# Patient Record
Sex: MALE | Race: OTHER | HISPANIC OR LATINO | URBAN - METROPOLITAN AREA
[De-identification: names, ages, dates, MRNs, and addresses within clinical notes are randomized per-mention and may not be internally consistent; named-entity substitution may affect disease eponyms.]

---

## 2023-08-31 ENCOUNTER — EMERGENCY (EMERGENCY)
Facility: HOSPITAL | Age: 43
LOS: 1 days | Discharge: HOME | End: 2023-08-31
Admitting: EMERGENCY MEDICINE
Payer: OTHER MISCELLANEOUS

## 2023-08-31 VITALS
RESPIRATION RATE: 18 BRPM | SYSTOLIC BLOOD PRESSURE: 217 MMHG | WEIGHT: 212.08 LBS | HEART RATE: 80 BPM | DIASTOLIC BLOOD PRESSURE: 142 MMHG | TEMPERATURE: 98 F

## 2023-08-31 VITALS — DIASTOLIC BLOOD PRESSURE: 97 MMHG | SYSTOLIC BLOOD PRESSURE: 180 MMHG | HEART RATE: 78 BPM
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
## 2023-08-31 DIAGNOSIS — Z86.59 PERSONAL HISTORY OF OTHER MENTAL AND BEHAVIORAL DISORDERS: ICD-10-CM
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
## 2023-08-31 DIAGNOSIS — S61.213A LACERATION WITHOUT FOREIGN BODY OF LEFT MIDDLE FINGER WITHOUT DAMAGE TO NAIL, INITIAL ENCOUNTER: ICD-10-CM

## 2023-08-31 DIAGNOSIS — E78.5 HYPERLIPIDEMIA, UNSPECIFIED: ICD-10-CM
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
## 2023-08-31 DIAGNOSIS — Z86.73 PERSONAL HISTORY OF TRANSIENT ISCHEMIC ATTACK (TIA), AND CEREBRAL INFARCTION WITHOUT RESIDUAL DEFICITS: ICD-10-CM

## 2023-08-31 DIAGNOSIS — I10 ESSENTIAL (PRIMARY) HYPERTENSION: ICD-10-CM

## 2023-08-31 LAB
ALBUMIN SERPL ELPH-MCNC: 3.4 G/DL — SIGNIFICANT CHANGE UP (ref 3.4–5)
ALP SERPL-CCNC: 153 U/L — HIGH (ref 40–120)
ALT FLD-CCNC: 25 U/L — SIGNIFICANT CHANGE UP (ref 12–42)
ANION GAP SERPL CALC-SCNC: 9 MMOL/L — SIGNIFICANT CHANGE UP (ref 9–16)
APPEARANCE UR: CLEAR — SIGNIFICANT CHANGE UP
AST SERPL-CCNC: 16 U/L — SIGNIFICANT CHANGE UP (ref 15–37)
BACTERIA # UR AUTO: ABNORMAL /HPF
BILIRUB SERPL-MCNC: 0.5 MG/DL — SIGNIFICANT CHANGE UP (ref 0.2–1.2)
BILIRUB UR-MCNC: NEGATIVE — SIGNIFICANT CHANGE UP
BUN SERPL-MCNC: 30 MG/DL — HIGH (ref 7–23)
CALCIUM SERPL-MCNC: 8.6 MG/DL — SIGNIFICANT CHANGE UP (ref 8.5–10.5)
CHLORIDE SERPL-SCNC: 103 MMOL/L — SIGNIFICANT CHANGE UP (ref 96–108)
CO2 SERPL-SCNC: 24 MMOL/L — SIGNIFICANT CHANGE UP (ref 22–31)
COLOR SPEC: YELLOW — SIGNIFICANT CHANGE UP
CREAT SERPL-MCNC: 2.5 MG/DL — HIGH (ref 0.5–1.3)
DIFF PNL FLD: ABNORMAL
EGFR: 32 ML/MIN/1.73M2 — LOW
EPI CELLS # UR: PRESENT
GLUCOSE SERPL-MCNC: 111 MG/DL — HIGH (ref 70–99)
GLUCOSE UR QL: NEGATIVE MG/DL — SIGNIFICANT CHANGE UP
HCT VFR BLD CALC: 48 % — SIGNIFICANT CHANGE UP (ref 39–50)
HGB BLD-MCNC: 16.1 G/DL — SIGNIFICANT CHANGE UP (ref 13–17)
HYALINE CASTS # UR AUTO: PRESENT
KETONES UR-MCNC: NEGATIVE MG/DL — SIGNIFICANT CHANGE UP
LEUKOCYTE ESTERASE UR-ACNC: NEGATIVE — SIGNIFICANT CHANGE UP
MCHC RBC-ENTMCNC: 29 PG — SIGNIFICANT CHANGE UP (ref 27–34)
MCHC RBC-ENTMCNC: 33.5 GM/DL — SIGNIFICANT CHANGE UP (ref 32–36)
MCV RBC AUTO: 86.5 FL — SIGNIFICANT CHANGE UP (ref 80–100)
NITRITE UR-MCNC: NEGATIVE — SIGNIFICANT CHANGE UP
NRBC # BLD: 0 /100 WBCS — SIGNIFICANT CHANGE UP (ref 0–0)
PH UR: 6.5 — SIGNIFICANT CHANGE UP (ref 5–8)
PLATELET # BLD AUTO: 227 K/UL — SIGNIFICANT CHANGE UP (ref 150–400)
POTASSIUM SERPL-MCNC: 4.1 MMOL/L — SIGNIFICANT CHANGE UP (ref 3.5–5.3)
POTASSIUM SERPL-SCNC: 4.1 MMOL/L — SIGNIFICANT CHANGE UP (ref 3.5–5.3)
PROT SERPL-MCNC: 7.7 G/DL — SIGNIFICANT CHANGE UP (ref 6.4–8.2)
PROT UR-MCNC: 300 MG/DL
RBC # BLD: 5.55 M/UL — SIGNIFICANT CHANGE UP (ref 4.2–5.8)
RBC # FLD: 12.4 % — SIGNIFICANT CHANGE UP (ref 10.3–14.5)
RBC CASTS # UR COMP ASSIST: 5 /HPF — HIGH (ref 0–4)
SODIUM SERPL-SCNC: 136 MMOL/L — SIGNIFICANT CHANGE UP (ref 132–145)
SP GR SPEC: 1.01 — SIGNIFICANT CHANGE UP (ref 1–1.03)
TROPONIN I, HIGH SENSITIVITY RESULT: 8 NG/L — SIGNIFICANT CHANGE UP
UROBILINOGEN FLD QL: 0.2 MG/DL — SIGNIFICANT CHANGE UP (ref 0.2–1)
WBC # BLD: 5.97 K/UL — SIGNIFICANT CHANGE UP (ref 3.8–10.5)
WBC # FLD AUTO: 5.97 K/UL — SIGNIFICANT CHANGE UP (ref 3.8–10.5)
WBC UR QL: 5 /HPF — SIGNIFICANT CHANGE UP (ref 0–5)

## 2023-08-31 PROCEDURE — 99285 EMERGENCY DEPT VISIT HI MDM: CPT

## 2023-08-31 RX ORDER — HYDRALAZINE HCL 50 MG
10 TABLET ORAL ONCE
Refills: 0 | Status: COMPLETED | OUTPATIENT
Start: 2023-08-31 | End: 2023-08-31

## 2023-08-31 RX ORDER — ACETAMINOPHEN 500 MG
650 TABLET ORAL ONCE
Refills: 0 | Status: COMPLETED | OUTPATIENT
Start: 2023-08-31 | End: 2023-08-31

## 2023-08-31 RX ORDER — TRANEXAMIC ACID 100 MG/ML
5 INJECTION, SOLUTION INTRAVENOUS ONCE
Refills: 0 | Status: COMPLETED | OUTPATIENT
Start: 2023-08-31 | End: 2023-08-31

## 2023-08-31 RX ORDER — AMLODIPINE BESYLATE 2.5 MG/1
10 TABLET ORAL ONCE
Refills: 0 | Status: COMPLETED | OUTPATIENT
Start: 2023-08-31 | End: 2023-08-31

## 2023-08-31 RX ORDER — AMLODIPINE BESYLATE 2.5 MG/1
1 TABLET ORAL
Qty: 14 | Refills: 0
Start: 2023-08-31 | End: 2023-09-13

## 2023-08-31 RX ADMIN — Medication 650 MILLIGRAM(S): at 12:20

## 2023-08-31 RX ADMIN — Medication 10 MILLIGRAM(S): at 12:21

## 2023-08-31 RX ADMIN — Medication 650 MILLIGRAM(S): at 11:56

## 2023-08-31 RX ADMIN — AMLODIPINE BESYLATE 10 MILLIGRAM(S): 2.5 TABLET ORAL at 11:22

## 2023-08-31 RX ADMIN — TRANEXAMIC ACID 5 MILLILITER(S): 100 INJECTION, SOLUTION INTRAVENOUS at 11:56

## 2023-08-31 RX ADMIN — Medication 10 MILLIGRAM(S): at 11:53

## 2023-08-31 NOTE — ED PROVIDER NOTE - CLINICAL SUMMARY MEDICAL DECISION MAKING FREE TEXT BOX
43-year-old male presents this emergency department for skin avulsion of the left finger  Pressure dressing applied, and will continue to monitor patient.    Patient's BP on arrival was 230/140.  10 mg of amlodipine, p.o. ordered.  We will recheck it 20 minutes after amlodipine is given.  Tetanus is up-to-date  Labs, cardiac profile, EKG ordered  EKG shows normal sinus 65 bpm, T wave inversions in lead III that are isolated.  No comparison available.  We will continue to monitor patient

## 2023-08-31 NOTE — ED ADULT TRIAGE NOTE - CHIEF COMPLAINT QUOTE
Pt complaining of laceration to L middle finger. Last Tdap 4 years ago. Pt hypertensive in triage states that he has not had his medication in 3 days. History of CVA. Pt complaining PT denies chest pain, headache and blurry vision. Pt complaining of laceration to L middle finger. Last Tdap 4 years ago. Pt hypertensive in triage states that he has not had his medication in 3 days. History of CVA. PT denies chest pain, headache and blurry vision.

## 2023-08-31 NOTE — ED PROVIDER NOTE - NSFOLLOWUPCLINICS_GEN_ALL_ED_FT
[facility]  Family Medicine  [address], [address]  [place], [place] [address]  Phone: [phone]  Fax:

## 2023-08-31 NOTE — ED PROVIDER NOTE - PROGRESS NOTE DETAILS
Patient sitting comfortably in room, no acute stress  Patient received total of 20 mg of hydralazine, the IV.  Bleeding is well controlled.  Repeat blood pressure is 181/97.  Will order amlodipine to the pharmacy, and discharge patient.  Also to the patient.  Patient understands and agrees with plan.  Agreed to follow-up with primary care doctor in 2 to 3 days.

## 2023-08-31 NOTE — ED PROVIDER NOTE - PATIENT PORTAL LINK FT
You can access the FollowMyHealth Patient Portal offered by [facility] by registering at the following website: [email or web address]. By joining [employer]’s FollowMyHealth portal, you will also be able to view your health information using other applications (apps) compatible with our system.

## 2023-08-31 NOTE — ED PROVIDER NOTE - OBJECTIVE STATEMENT
43-year-old male, medical history of hypertension, hyperlipidemia, and CVA 4 years ago, alcohol abuse, presents to this emergency department for a skin avulsion that occurred at work 45 minutes prior to arrival.  Patient states that he was cutting with a knife when it slipped.  Avulsion is on the left middle finger on the dorsal aspect between the PIP and DIP joint.  States that started bleeding immediately, and he wrapped it up.  However states is not stopping bleeding.    Of note patient has a blood pressure of 240/140 on arrival.  States that he has a history of hypertension, however has not taken his medications in 3 days because he ran out.  States that he does not have primary care doctor" I usually get it from the hospital".  Is unsure what he takes.  Denies any headaches, dizziness, chest pain, shortness of breath, abdominal pain, nausea, vomiting, diarrhea, constipation, hematuria.    Tetanus is up-to-date.

## 2023-08-31 NOTE — ED PROVIDER NOTE - PHYSICAL EXAMINATION
General: well developed, well nourished, no distress  Eye: bilateral: PERRL, EOMI  Ears, Nose, Throat: normal pharynx, TMs normal, membranes moist.  Neck: non-tender, full range of motion, supple.  Negative For: lymphadenopathy (R), lymphadenopathy (L)  Respiratory: CTAB.  Cardiovascular: S1-S2 normal, regular rate, regular rhythm.  Abdomen: normal bowel sounds, non tender, soft.    Genitourinary: Negative For: CVA tenderness  Musculoskeletal: normal gait.  Negative For: back pain, upper, back pain  Extremities: normal range of motion, non-tender.  Negative For: edema (R), edema (L), calf tenderness (R), calf tenderness (L), swelling  Extremity Strength: upper extremities equal bilateral: 5/5, lower extremities equal bilateral: 5/5  Neurologic: alert, oriented to person, oriented to place, oriented to time.    Skin: normal color.    Skin avulsion appreciated on the left middle finger on the dorsal aspect between the PIP and DIP joint.  Blood oozing from it, likely venous.  Range of motion is intact in all other joints.  Psychiatric: normal affect, normal insight, normal concentration

## 2023-08-31 NOTE — ED ADULT NURSE NOTE - OBJECTIVE STATEMENT
42 y/o M here with laceration to L middle finger. Last Tdap 4 years ago. Pt hypertensive in triage states that he has not had his medication in 3 days. History of CVA. PT denies chest pain, headache and blurry vision. A&Ox4 ambulatory. NKA.

## 2023-08-31 NOTE — ED ADULT NURSE REASSESSMENT NOTE - NS ED NURSE REASSESS COMMENT FT1
Patient's blood pressure elevated. 228/142 provider made aware. Hydralazine 10mg ordered and given. Repeat blood pressure 210/121. Another 10mg of hydralazine given waiting repeat blood pressure.

## 2023-08-31 NOTE — ED ADULT NURSE NOTE - CHIEF COMPLAINT QUOTE
Pt complaining of laceration to L middle finger. Last Tdap 4 years ago. Pt hypertensive in triage states that he has not had his medication in 3 days. History of CVA. PT denies chest pain, headache and blurry vision.

## 2023-08-31 NOTE — ED ADULT TRIAGE NOTE - NS ED TRIAGE AVPU SCALE
Alert-The patient is alert, awake and responds to voice. The patient is oriented to time, place, and person. The triage nurse is able to obtain subjective information. never

## 2023-08-31 NOTE — ED ADULT NURSE NOTE - NSFALLUNIVINTERV_ED_ALL_ED
Bed/Stretcher in lowest position, wheels locked, appropriate side rails in place/Call bell, personal items and telephone in reach/Instruct patient to call for assistance before getting out of bed/chair/stretcher/Non-slip footwear applied when patient is off stretcher/[place] to call system/Physically safe environment - no spills, clutter or unnecessary equipment/Purposeful proactive rounding/Room/bathroom lighting operational, light cord in reach

## 2023-08-31 NOTE — ED ADULT TRIAGE NOTE - TELEPHONIC ID NUMBER OF THE INTERPRETER
What Type Of Note Output Would You Prefer (Optional)?: Bullet Format Has Your Skin Lesion Been Treated?: not been treated Is This A New Presentation, Or A Follow-Up?: Skin Lesion [id number]

## 2023-08-31 NOTE — ED PROVIDER NOTE - EKG ADDITIONAL INFORMATION FREE TEXT
NS at 65 bpm  Goo R wave progression  T wave inversion in Lead V1, no other ischemic T wave abnormalities  No priors to compare to

## 2023-08-31 NOTE — ED PROVIDER NOTE - NSFOLLOWUPINSTRUCTIONS_ED_ALL_ED_FT
Abrasion  An abrasion is a cut or a scrape on the surface of the skin. An abrasion does not go through all the layers of the skin. It is important to care for an abrasion properly to prevent infection.    What are the causes?  This condition is caused by rubbing your skin on something or falling on a surface, such as the ground. When your skin rubs on something, some layers of skin may rub off.    What are the signs or symptoms?  The main symptom of this condition is a cut or a scrape. The cut or scrape may be bleeding, or it may appear red or pink. If your abrasion was caused by a fall, there may be a bruise under your cut or scrape.    How is this diagnosed?  An abrasion is diagnosed with a physical exam.    How is this treated?  Treatment for this condition depends on how large and deep the abrasion is. In most cases:  Your abrasion will be cleaned with water and mild soap. This is done to remove any dirt or debris (such as tiny bits of glass or rock) that may be stuck in your wound.  An antibiotic ointment may be applied to your abrasion to help prevent infection.  A bandage (dressing) may be placed on your abrasion to keep it clean.  You may also need a tetanus shot.    Follow these instructions at home:  Medicines    Take or apply over-the-counter and prescription medicines only as told by your health care provider.  If you were prescribed an antibiotic medicine, use it as told by your health care provider. Do not stop using the antibiotic even if you start to feel better.  Wound care    Clean your wound 1 or 2 times a day, or as told by your health care provider. To do this:  Wash your hands for at least 20 seconds with mild soap and water. Do this before and after you clean your wound.  Wash your wound with mild soap and water and then rinse off the soap.  Pat your wound dry with a clean towel. Do not rub your wound.  Keep your dressing clean and dry as told by your health care provider.  There are many different ways to close and cover a wound. Follow instructions from your health care provider about caring for your wound and about changing and removing your dressing. You may have to change your dressing one or more times a day, or as directed by your health care provider.  Check your wound every day for signs of infection. Check for:  Redness, especially a red streak that spreads out from your wound.  Swelling or increased pain.  Warmth.  Blood, fluid, pus, or a bad smell.  Managing pain and swelling      If directed, put ice on the injured area. To do this:  Put ice in a plastic bag.  Place a towel between your skin and the bag.  Leave the ice on for 20 minutes, 2–3 times a day.  Remove the ice if your skin turns bright red. This is very important. If you cannot feel pain, heat, or cold, you have a greater risk of damage to the area.  If possible, raise (elevate) the injured area above the level of your heart while you are sitting or lying down.  General instructions    Do not take baths, swim, or use a hot tub until your health care provider approves. Ask your doctor about taking showers or sponge baths.  Keep all follow-up visits. This is important.  Contact a health care provider if:  You received a tetanus shot, and you have swelling, severe pain, redness, or bleeding at your injection site.  Your pain is not controlled with medicine.  You have a fever.  You have any of these signs of infection:  Redness, swelling, or more pain around your wound.  Warmth coming from your wound.  Blood, fluid, pus, or a bad smell coming from your wound.  Get help right away if:  You have a red streak spreading away from your wound.  Summary  An abrasion is a cut or a scrape on the surface of the skin. Care for your abrasion properly to prevent infection.  Clean your wound with mild soap and water 1 or 2 times a day or as often as told. Follow instructions from your health care provider about taking medicines and changing your bandage (dressing).  Contact your health care provider if you have a fever or if you have redness, swelling, or more pain around your wound.  Contact your health care provider if you have warmth, blood, fluid, pus, or a bad smell coming from your wound.  Get help right away if there is a red streak spreading away from your wound.  This information is not intended to replace advice given to you by your health care provider. Make sure you discuss any questions you have with your health care provider.    Document Revised: 03/18/2021 Document Reviewed: 03/18/2021    Hypertension    Hypertension, commonly called high blood pressure, is when the force of blood pumping through your arteries is too strong. Hypertension forces your heart to work harder to pump blood. Your arteries may become narrow or stiff. Having untreated or uncontrolled hypertension for a long period of time can cause heart attack, stroke, kidney disease, and other problems. If started on a medication, take exactly as prescribed by your health care professional. Maintain a healthy lifestyle and follow up with your primary care physician.    SEEK IMMEDIATE MEDICAL CARE IF YOU HAVE ANY OF THE FOLLOWING SYMPTOMS: severe headache, confusion, chest pain, abdominal pain, vomiting, or shortness of breath.

## 2024-08-18 ENCOUNTER — EMERGENCY (EMERGENCY)
Facility: HOSPITAL | Age: 44
LOS: 1 days | Discharge: HOME | End: 2024-08-18
Admitting: EMERGENCY MEDICINE
Payer: SELF-PAY

## 2024-08-18 VITALS
SYSTOLIC BLOOD PRESSURE: 176 MMHG | OXYGEN SATURATION: 94 % | TEMPERATURE: 98 F | HEART RATE: 81 BPM | DIASTOLIC BLOOD PRESSURE: 132 MMHG | RESPIRATION RATE: 18 BRPM

## 2024-08-18 DIAGNOSIS — F12.90 CANNABIS USE, UNSPECIFIED, UNCOMPLICATED: ICD-10-CM

## 2024-08-18 DIAGNOSIS — F11.10 OPIOID ABUSE, UNCOMPLICATED: ICD-10-CM

## 2024-08-18 DIAGNOSIS — F10.929 ALCOHOL USE, UNSPECIFIED WITH INTOXICATION, UNSPECIFIED: ICD-10-CM

## 2024-08-18 DIAGNOSIS — I10 ESSENTIAL (PRIMARY) HYPERTENSION: ICD-10-CM

## 2024-08-18 PROCEDURE — 99284 EMERGENCY DEPT VISIT MOD MDM: CPT

## 2024-08-18 RX ORDER — ONDANSETRON HCL/PF 4 MG/2 ML
4 VIAL (ML) INJECTION ONCE
Refills: 0 | Status: COMPLETED | OUTPATIENT
Start: 2024-08-18 | End: 2024-08-18

## 2024-08-18 RX ORDER — AMLODIPINE BESYLATE 2.5 MG/1
10 TABLET ORAL ONCE
Refills: 0 | Status: COMPLETED | OUTPATIENT
Start: 2024-08-18 | End: 2024-08-18

## 2024-08-18 RX ADMIN — Medication 4 MILLIGRAM(S): at 19:06

## 2024-08-18 RX ADMIN — AMLODIPINE BESYLATE 10 MILLIGRAM(S): 2.5 TABLET ORAL at 23:12

## 2024-08-18 NOTE — ED ADULT NURSE NOTE - NSFALLRISKINTERV_ED_ALL_ED
Assistance OOB with selected safe patient handling equipment if applicable/Assistance with ambulation/Communicate fall risk and risk factors to all staff, patient, and family/Monitor gait and stability/Monitor for mental status changes and reorient to person, place, and time, as needed/Provide visual cue: yellow wristband, yellow gown, etc/Reinforce activity limits and safety measures with patient and family/Toileting schedule using arm’s reach rule for commode and bathroom/Use of alarms - bed, stretcher, chair and/or video monitoring/Call bell, personal items and telephone in reach/Instruct patient to call for assistance before getting out of bed/chair/stretcher/Non-slip footwear applied when patient is off stretcher/[place] to call system/Physically safe environment - no spills, clutter or unnecessary equipment/Purposeful Proactive Rounding/Room/bathroom lighting operational, light cord in reach

## 2024-08-18 NOTE — ED PROVIDER NOTE - CLINICAL SUMMARY MEDICAL DECISION MAKING FREE TEXT BOX
alcohol intoxication with marijuana use, no signs of trauma, anticipate dc once more clinically sober. will sign out to night team.

## 2024-08-18 NOTE — ED PROVIDER NOTE - OBJECTIVE STATEMENT
45 yo male with hx HTN, BIBEMS for public alcohol intoxication. patient admits to drinking 12 beers and smoking marijuana. he was vomiting in triage but now resolved. denies fall or trauma. no HA or neck pain. no cp/sob/abd pain.

## 2024-08-18 NOTE — ED ADULT TRIAGE NOTE - CHIEF COMPLAINT QUOTE
Pt BIBEMS from street complaining of ams. Pt vomiting in triage. admits to drinking tequila and smoking marijuana.

## 2024-08-18 NOTE — ED PROVIDER NOTE - PHYSICAL EXAMINATION
General: no signs of trauma, somnolent, in no apparent distress. +AOB.   Head: AT, NC  HEENT: Airway patent  Eyes: clear bilaterally, pupils equal, round and reactive to light.  Cardiac: Normal rate, regular rhythm.  Heart sounds S1, S2.  No murmurs, rubs or gallops.  Respiratory: Breath sounds clear and equal bilaterally.  Abdomen soft, non-tender, no guarding.  MSK: moving all extremities x 4  Neuro: somnolent, follows commands  Skin: normal color.

## 2024-08-18 NOTE — ED ADULT NURSE NOTE - HIV OFFER
Colonoscopy Procedure Note      Patient: Caden Daniels  : 1972  Acct#:     Procedure: Colonoscopy    Date:  2023    Surgeon:  Oliverio Mcneil MD    Referring Physician:  Rebecca Patterson MD    Previous Colonoscopy: NO  Date: N/A  Greater than 3 years: N/A    Preoperative Diagnosis:  1. Screening     Postoperative Diagnosis:  1. Normal Colonoscopy    Consent:  The patient or their legal guardian has signed a consent, and is aware of the potential risks, benefits, alternatives, and potential complications of this procedure. These include, but are not limited to hemorrhage, bleeding, post procedural pain, perforation, phlebitis, aspiration, hypotension, hypoxia, cardiovascular events such as arryhthmia, and possibly death. Additionally, the possibility of missed colonic polyps and interval colon cancer was discussed in the consent. Anesthesia:  The patient was administered IV propofol per anesthesiology team.  Please see their operative records for full details. Procedure: An informed consent was obtained from the patient after explanation of indications, benefits, possible risks and complications of the procedure. The patient was then taken to the endoscopy suite, placed in the left lateral decubitus position, and the above IV anesthesia was administered. A digital rectal examination was performed and revealed negative without mass, lesions or tenderness. The Olympus video colonoscope was placed in the patient's rectum under digital direction and advanced to the cecum. The cecum was identified by characteristic anatomy and ballottment. The preparation was good. The ileocecal valve was identified. The scope was then withdrawn back through the cecum, ascending, transverse, descending, sigmoid colon, and rectum. Careful circumferential examination of the mucosa in these areas demonstrated:     The colon was normal with no polyps, masses, inflammation, or other Previously Declined (within the last year)

## 2024-08-18 NOTE — ED PROVIDER NOTE - PATIENT PORTAL LINK FT
You can access the FollowMyHealth Patient Portal offered by [facility] by registering at the following website: [email or web address]. By joining [employer]’s FollowMyHealth portal, you will also be able to view your health information using other applications (apps) compatible with our system.

## 2024-08-18 NOTE — ED ADULT TRIAGE NOTE - DIRECT TO ROOM CARE INITIATED:
18-Aug-2024 18:11 Detail Level: Zone Continue Regimen: Apply Efudex BID on scalp 3-4 weeks then stop

## 2024-08-19 VITALS
DIASTOLIC BLOOD PRESSURE: 131 MMHG | SYSTOLIC BLOOD PRESSURE: 171 MMHG | OXYGEN SATURATION: 97 % | HEART RATE: 73 BPM | TEMPERATURE: 98 F | RESPIRATION RATE: 16 BRPM

## 2024-08-19 PROBLEM — Z78.9 OTHER SPECIFIED HEALTH STATUS: Chronic | Status: ACTIVE | Noted: 2023-08-31

## 2024-08-19 NOTE — ED ADULT NURSE REASSESSMENT NOTE - NS ED NURSE REASSESS COMMENT FT1
Pt. received resting in stretcher with eyes closed easily arousable to voice. Pt. is breathing at ease on room air with visible chest rise. Fall precautions in place, stretcher alarm on and hourly rounding implemented. Awaiting sobriety for discharge.
Patient reevaluated by MD; cleared for discharge. Patient alert verbal oriented x3; ambulatory w/ steady gait. Left ED safely without complaint. Discharge paperwork provided. Patient educated on importance on blood pressure management and seeing PMD ASAP for med refill

## 2024-09-30 ENCOUNTER — INPATIENT (INPATIENT)
Facility: HOSPITAL | Age: 44
LOS: 163 days | Discharge: NURSING, REHAB OR LONG-TERM CARE | End: 2025-03-13
Attending: NEUROLOGICAL SURGERY | Admitting: NEUROLOGICAL SURGERY
Payer: MEDICAID

## 2024-09-30 VITALS — DIASTOLIC BLOOD PRESSURE: 110 MMHG | HEART RATE: 110 BPM | SYSTOLIC BLOOD PRESSURE: 253 MMHG

## 2024-09-30 DIAGNOSIS — I63.9 CEREBRAL INFARCTION, UNSPECIFIED: ICD-10-CM

## 2024-09-30 LAB
A1C WITH ESTIMATED AVERAGE GLUCOSE RESULT: 5.4 % — SIGNIFICANT CHANGE UP (ref 4–5.6)
ADD ON TEST-SPECIMEN IN LAB: SIGNIFICANT CHANGE UP
ALBUMIN SERPL ELPH-MCNC: 3.5 G/DL — SIGNIFICANT CHANGE UP (ref 3.4–5)
ALBUMIN SERPL ELPH-MCNC: 3.6 G/DL — SIGNIFICANT CHANGE UP (ref 3.3–5)
ALP SERPL-CCNC: 143 U/L — HIGH (ref 40–120)
ALP SERPL-CCNC: 169 U/L — HIGH (ref 40–120)
ALT FLD-CCNC: 13 U/L — SIGNIFICANT CHANGE UP (ref 10–45)
ALT FLD-CCNC: 24 U/L — SIGNIFICANT CHANGE UP (ref 12–42)
AMPHET UR-MCNC: NEGATIVE — SIGNIFICANT CHANGE UP
ANION GAP SERPL CALC-SCNC: 15 MMOL/L — SIGNIFICANT CHANGE UP (ref 9–16)
ANION GAP SERPL CALC-SCNC: 16 MMOL/L — SIGNIFICANT CHANGE UP (ref 5–17)
APTT BLD: 28.4 SEC — SIGNIFICANT CHANGE UP (ref 24.5–35.6)
AST SERPL-CCNC: 14 U/L — SIGNIFICANT CHANGE UP (ref 10–40)
AST SERPL-CCNC: 29 U/L — SIGNIFICANT CHANGE UP (ref 15–37)
BARBITURATES UR SCN-MCNC: NEGATIVE — SIGNIFICANT CHANGE UP
BASE EXCESS BLDA CALC-SCNC: -6.6 MMOL/L — LOW (ref -2–3)
BASOPHILS # BLD AUTO: 0.1 K/UL — SIGNIFICANT CHANGE UP (ref 0–0.2)
BASOPHILS NFR BLD AUTO: 0.9 % — SIGNIFICANT CHANGE UP (ref 0–2)
BENZODIAZ UR-MCNC: POSITIVE
BILIRUB SERPL-MCNC: 0.5 MG/DL — SIGNIFICANT CHANGE UP (ref 0.2–1.2)
BILIRUB SERPL-MCNC: 0.5 MG/DL — SIGNIFICANT CHANGE UP (ref 0.2–1.2)
BLD GP AB SCN SERPL QL: NEGATIVE — SIGNIFICANT CHANGE UP
BUN SERPL-MCNC: 45 MG/DL — HIGH (ref 7–23)
BUN SERPL-MCNC: 53 MG/DL — HIGH (ref 7–23)
CALCIUM SERPL-MCNC: 8 MG/DL — LOW (ref 8.4–10.5)
CALCIUM SERPL-MCNC: 8.3 MG/DL — LOW (ref 8.5–10.5)
CHLORIDE SERPL-SCNC: 106 MMOL/L — SIGNIFICANT CHANGE UP (ref 96–108)
CHLORIDE SERPL-SCNC: 107 MMOL/L — SIGNIFICANT CHANGE UP (ref 96–108)
CHOLEST SERPL-MCNC: 214 MG/DL — HIGH
CK SERPL-CCNC: 341 U/L — HIGH (ref 30–200)
CO2 BLDA-SCNC: 20 MMOL/L — SIGNIFICANT CHANGE UP (ref 19–24)
CO2 SERPL-SCNC: 17 MMOL/L — LOW (ref 22–31)
CO2 SERPL-SCNC: 19 MMOL/L — LOW (ref 22–31)
COCAINE METAB.OTHER UR-MCNC: NEGATIVE — SIGNIFICANT CHANGE UP
CREAT ?TM UR-MCNC: 115 MG/DL — SIGNIFICANT CHANGE UP
CREAT SERPL-MCNC: 3.3 MG/DL — HIGH (ref 0.5–1.3)
CREAT SERPL-MCNC: 3.43 MG/DL — HIGH (ref 0.5–1.3)
EGFR: 18 ML/MIN/1.73M2 — LOW
EGFR: 18 ML/MIN/1.73M2 — LOW
EGFR: 19 ML/MIN/1.73M2 — LOW
EGFR: 19 ML/MIN/1.73M2 — LOW
EOSINOPHIL # BLD AUTO: 0.86 K/UL — HIGH (ref 0–0.5)
EOSINOPHIL NFR BLD AUTO: 7.4 % — HIGH (ref 0–6)
ESTIMATED AVERAGE GLUCOSE: 108 MG/DL — SIGNIFICANT CHANGE UP (ref 68–114)
FENTANYL UR QL SCN: NEGATIVE — SIGNIFICANT CHANGE UP
GLUCOSE BLDC GLUCOMTR-MCNC: 110 MG/DL — HIGH (ref 70–99)
GLUCOSE BLDC GLUCOMTR-MCNC: 122 MG/DL — HIGH (ref 70–99)
GLUCOSE BLDC GLUCOMTR-MCNC: 184 MG/DL — HIGH (ref 70–99)
GLUCOSE SERPL-MCNC: 155 MG/DL — HIGH (ref 70–99)
GLUCOSE SERPL-MCNC: 162 MG/DL — HIGH (ref 70–99)
HAV IGM SER-ACNC: SIGNIFICANT CHANGE UP
HBV CORE IGM SER-ACNC: SIGNIFICANT CHANGE UP
HBV SURFACE AG SER-ACNC: SIGNIFICANT CHANGE UP
HCO3 BLDA-SCNC: 18 MMOL/L — LOW (ref 21–28)
HCT VFR BLD CALC: 41.2 % — SIGNIFICANT CHANGE UP (ref 39–50)
HCT VFR BLD CALC: 45.8 % — SIGNIFICANT CHANGE UP (ref 39–50)
HCV AB S/CO SERPL IA: 0.05 S/CO — SIGNIFICANT CHANGE UP
HCV AB SERPL-IMP: SIGNIFICANT CHANGE UP
HDLC SERPL-MCNC: 54 MG/DL — SIGNIFICANT CHANGE UP
HGB BLD-MCNC: 13.6 G/DL — SIGNIFICANT CHANGE UP (ref 13–17)
HGB BLD-MCNC: 15.5 G/DL — SIGNIFICANT CHANGE UP (ref 13–17)
HIV 1+2 AB+HIV1 P24 AG SERPL QL IA: SIGNIFICANT CHANGE UP
IMM GRANULOCYTES NFR BLD AUTO: 0.3 % — SIGNIFICANT CHANGE UP (ref 0–0.9)
INR BLD: 0.89 — SIGNIFICANT CHANGE UP (ref 0.85–1.16)
LACTATE SERPL-SCNC: 1 MMOL/L — SIGNIFICANT CHANGE UP (ref 0.5–2)
LIPID PNL WITH DIRECT LDL SERPL: 127 MG/DL — HIGH
LYMPHOCYTES # BLD AUTO: 39.5 % — SIGNIFICANT CHANGE UP (ref 13–44)
LYMPHOCYTES # BLD AUTO: 4.62 K/UL — HIGH (ref 1–3.3)
MAGNESIUM SERPL-MCNC: 1.9 MG/DL — SIGNIFICANT CHANGE UP (ref 1.6–2.6)
MCHC RBC-ENTMCNC: 27.8 PG — SIGNIFICANT CHANGE UP (ref 27–34)
MCHC RBC-ENTMCNC: 29.2 PG — SIGNIFICANT CHANGE UP (ref 27–34)
MCHC RBC-ENTMCNC: 33 GM/DL — SIGNIFICANT CHANGE UP (ref 32–36)
MCHC RBC-ENTMCNC: 33.8 GM/DL — SIGNIFICANT CHANGE UP (ref 32–36)
MCV RBC AUTO: 84.1 FL — SIGNIFICANT CHANGE UP (ref 80–100)
MCV RBC AUTO: 86.3 FL — SIGNIFICANT CHANGE UP (ref 80–100)
METHADONE UR-MCNC: NEGATIVE — SIGNIFICANT CHANGE UP
MONOCYTES # BLD AUTO: 0.71 K/UL — SIGNIFICANT CHANGE UP (ref 0–0.9)
MONOCYTES NFR BLD AUTO: 6.1 % — SIGNIFICANT CHANGE UP (ref 2–14)
NEUTROPHILS # BLD AUTO: 5.37 K/UL — SIGNIFICANT CHANGE UP (ref 1.8–7.4)
NEUTROPHILS NFR BLD AUTO: 45.8 % — SIGNIFICANT CHANGE UP (ref 43–77)
NON HDL CHOLESTEROL: 160 MG/DL — HIGH
NRBC # BLD: 0 /100 WBCS — SIGNIFICANT CHANGE UP (ref 0–0)
NRBC # BLD: 0 /100 WBCS — SIGNIFICANT CHANGE UP (ref 0–0)
NRBC BLD-RTO: 0 /100 WBCS — SIGNIFICANT CHANGE UP (ref 0–0)
NRBC BLD-RTO: 0 /100 WBCS — SIGNIFICANT CHANGE UP (ref 0–0)
OPIATES UR-MCNC: NEGATIVE — SIGNIFICANT CHANGE UP
PCO2 BLDA: 35 MMHG — SIGNIFICANT CHANGE UP (ref 35–48)
PCP SPEC-MCNC: SIGNIFICANT CHANGE UP
PCP UR-MCNC: NEGATIVE — SIGNIFICANT CHANGE UP
PH BLDA: 7.33 — LOW (ref 7.35–7.45)
PHOSPHATE SERPL-MCNC: 2.1 MG/DL — LOW (ref 2.5–4.5)
PLATELET # BLD AUTO: 245 K/UL — SIGNIFICANT CHANGE UP (ref 150–400)
PLATELET # BLD AUTO: 258 K/UL — SIGNIFICANT CHANGE UP (ref 150–400)
PO2 BLDA: 141 MMHG — HIGH (ref 83–108)
POTASSIUM SERPL-MCNC: 3.1 MMOL/L — LOW (ref 3.5–5.3)
POTASSIUM SERPL-MCNC: 3.2 MMOL/L — LOW (ref 3.5–5.3)
POTASSIUM SERPL-SCNC: 3.1 MMOL/L — LOW (ref 3.5–5.3)
POTASSIUM SERPL-SCNC: 3.2 MMOL/L — LOW (ref 3.5–5.3)
PROT SERPL-MCNC: 6.7 G/DL — SIGNIFICANT CHANGE UP (ref 6–8.3)
PROT SERPL-MCNC: 8.3 G/DL — HIGH (ref 6.4–8.2)
PROTHROM AB SERPL-ACNC: 10.3 SEC — SIGNIFICANT CHANGE UP (ref 9.9–13.4)
RBC # BLD: 4.9 M/UL — SIGNIFICANT CHANGE UP (ref 4.2–5.8)
RBC # BLD: 5.31 M/UL — SIGNIFICANT CHANGE UP (ref 4.2–5.8)
RBC # FLD: 12.7 % — SIGNIFICANT CHANGE UP (ref 10.3–14.5)
RBC # FLD: 12.8 % — SIGNIFICANT CHANGE UP (ref 10.3–14.5)
RH IG SCN BLD-IMP: POSITIVE — SIGNIFICANT CHANGE UP
SAO2 % BLDA: 98.4 % — HIGH (ref 94–98)
SODIUM SERPL-SCNC: 140 MMOL/L — SIGNIFICANT CHANGE UP (ref 132–145)
SODIUM SERPL-SCNC: 140 MMOL/L — SIGNIFICANT CHANGE UP (ref 135–145)
SODIUM UR-SCNC: 27 MMOL/L — SIGNIFICANT CHANGE UP
THC UR QL: NEGATIVE — SIGNIFICANT CHANGE UP
TRIGL SERPL-MCNC: 189 MG/DL — HIGH
TROPONIN I, HIGH SENSITIVITY RESULT: 10.5 NG/L — SIGNIFICANT CHANGE UP
TROPONIN T, HIGH SENSITIVITY RESULT: 18 NG/L — SIGNIFICANT CHANGE UP (ref 0–51)
TSH SERPL-MCNC: 1.23 UIU/ML — SIGNIFICANT CHANGE UP (ref 0.27–4.2)
WBC # BLD: 11.7 K/UL — HIGH (ref 3.8–10.5)
WBC # BLD: 14.87 K/UL — HIGH (ref 3.8–10.5)
WBC # FLD AUTO: 11.7 K/UL — HIGH (ref 3.8–10.5)
WBC # FLD AUTO: 14.87 K/UL — HIGH (ref 3.8–10.5)

## 2024-09-30 PROCEDURE — 93306 TTE W/DOPPLER COMPLETE: CPT | Mod: 26

## 2024-09-30 PROCEDURE — 70450 CT HEAD/BRAIN W/O DYE: CPT | Mod: 26

## 2024-09-30 PROCEDURE — 99292 CRITICAL CARE ADDL 30 MIN: CPT | Mod: 25

## 2024-09-30 PROCEDURE — 31500 INSERT EMERGENCY AIRWAY: CPT

## 2024-09-30 PROCEDURE — 70450 CT HEAD/BRAIN W/O DYE: CPT | Mod: 26,MC,77

## 2024-09-30 PROCEDURE — 36620 INSERTION CATHETER ARTERY: CPT

## 2024-09-30 PROCEDURE — 70496 CT ANGIOGRAPHY HEAD: CPT | Mod: 26,MC

## 2024-09-30 PROCEDURE — 70498 CT ANGIOGRAPHY NECK: CPT | Mod: 26,MC

## 2024-09-30 PROCEDURE — 70450 CT HEAD/BRAIN W/O DYE: CPT | Mod: 26,77

## 2024-09-30 PROCEDURE — 71045 X-RAY EXAM CHEST 1 VIEW: CPT | Mod: 26,77

## 2024-09-30 PROCEDURE — 99291 CRITICAL CARE FIRST HOUR: CPT

## 2024-09-30 PROCEDURE — 71045 X-RAY EXAM CHEST 1 VIEW: CPT | Mod: 26,76

## 2024-09-30 PROCEDURE — 99291 CRITICAL CARE FIRST HOUR: CPT | Mod: 25

## 2024-09-30 RX ORDER — FENTANYL CITRATE-0.9 % NACL/PF 100MCG/2ML
25 SYRINGE (ML) INTRAVENOUS
Refills: 0 | Status: DISCONTINUED | OUTPATIENT
Start: 2024-09-30 | End: 2024-10-04

## 2024-09-30 RX ORDER — NICARDIPINE HCL 30 MG
5 CAPSULE ORAL
Qty: 40 | Refills: 0 | Status: DISCONTINUED | OUTPATIENT
Start: 2024-09-30 | End: 2024-09-30

## 2024-09-30 RX ORDER — ETOMIDATE 2 MG/ML
20 SYRINGE (ML) INTRAVENOUS ONCE
Refills: 0 | Status: COMPLETED | OUTPATIENT
Start: 2024-09-30 | End: 2024-09-30

## 2024-09-30 RX ORDER — NICARDIPINE HCL 30 MG
5 CAPSULE ORAL
Qty: 40 | Refills: 0 | Status: DISCONTINUED | OUTPATIENT
Start: 2024-09-30 | End: 2024-10-02

## 2024-09-30 RX ORDER — SUCCINYLCHOLINE CHLORIDE 20 MG/ML
100 VIAL (ML) INJECTION ONCE
Refills: 0 | Status: COMPLETED | OUTPATIENT
Start: 2024-09-30 | End: 2024-09-30

## 2024-09-30 RX ORDER — PROPOFOL 10 MG/ML
5 INJECTION, EMULSION INTRAVENOUS
Qty: 1000 | Refills: 0 | Status: DISCONTINUED | OUTPATIENT
Start: 2024-09-30 | End: 2024-09-30

## 2024-09-30 RX ORDER — ONDANSETRON HCL/PF 4 MG/2 ML
4 VIAL (ML) INJECTION ONCE
Refills: 0 | Status: COMPLETED | OUTPATIENT
Start: 2024-09-30 | End: 2024-09-30

## 2024-09-30 RX ORDER — SENNA 187 MG
2 TABLET ORAL AT BEDTIME
Refills: 0 | Status: DISCONTINUED | OUTPATIENT
Start: 2024-09-30 | End: 2024-10-02

## 2024-09-30 RX ORDER — MAGNESIUM SULFATE 500 MG/ML
1 SYRINGE (ML) INJECTION ONCE
Refills: 0 | Status: COMPLETED | OUTPATIENT
Start: 2024-09-30 | End: 2024-09-30

## 2024-09-30 RX ORDER — PROPOFOL 10 MG/ML
50 INJECTION, EMULSION INTRAVENOUS ONCE
Refills: 0 | Status: COMPLETED | OUTPATIENT
Start: 2024-09-30 | End: 2024-09-30

## 2024-09-30 RX ORDER — POLYETHYLENE GLYCOL 3350 17 G/17G
17 POWDER, FOR SOLUTION ORAL DAILY
Refills: 0 | Status: DISCONTINUED | OUTPATIENT
Start: 2024-09-30 | End: 2024-09-30

## 2024-09-30 RX ORDER — SODIUM CHLORIDE 3 G/100ML
250 INJECTION, SOLUTION INTRAVENOUS ONCE
Refills: 0 | Status: COMPLETED | OUTPATIENT
Start: 2024-09-30 | End: 2024-09-30

## 2024-09-30 RX ORDER — POLYETHYLENE GLYCOL 3350 17 G/17G
17 POWDER, FOR SOLUTION ORAL DAILY
Refills: 0 | Status: DISCONTINUED | OUTPATIENT
Start: 2024-09-30 | End: 2024-10-03

## 2024-09-30 RX ORDER — SODIUM CHLORIDE 3 G/100ML
500 INJECTION, SOLUTION INTRAVENOUS
Refills: 0 | Status: DISCONTINUED | OUTPATIENT
Start: 2024-09-30 | End: 2024-10-01

## 2024-09-30 RX ORDER — PROPOFOL 10 MG/ML
80 INJECTION, EMULSION INTRAVENOUS ONCE
Refills: 0 | Status: COMPLETED | OUTPATIENT
Start: 2024-09-30 | End: 2024-09-30

## 2024-09-30 RX ORDER — INSULIN LISPRO 100 U/ML
INJECTION, SOLUTION INTRAVENOUS; SUBCUTANEOUS EVERY 6 HOURS
Refills: 0 | Status: DISCONTINUED | OUTPATIENT
Start: 2024-09-30 | End: 2024-10-04

## 2024-09-30 RX ORDER — AMLODIPINE BESYLATE 10 MG/1
10 TABLET ORAL DAILY
Refills: 0 | Status: DISCONTINUED | OUTPATIENT
Start: 2024-09-30 | End: 2024-10-21

## 2024-09-30 RX ORDER — NALOXONE HYDROCHLORIDE 0.4 MG/ML
0.4 INJECTION, SOLUTION INTRAMUSCULAR; INTRAVENOUS; SUBCUTANEOUS ONCE
Refills: 0 | Status: COMPLETED | OUTPATIENT
Start: 2024-09-30 | End: 2024-09-30

## 2024-09-30 RX ORDER — PROPOFOL 10 MG/ML
50 INJECTION, EMULSION INTRAVENOUS ONCE
Refills: 0 | Status: DISCONTINUED | OUTPATIENT
Start: 2024-09-30 | End: 2024-09-30

## 2024-09-30 RX ORDER — ACETAMINOPHEN 500 MG/5ML
650 LIQUID (ML) ORAL EVERY 6 HOURS
Refills: 0 | Status: DISCONTINUED | OUTPATIENT
Start: 2024-09-30 | End: 2024-10-21

## 2024-09-30 RX ORDER — SENNA 187 MG
2 TABLET ORAL AT BEDTIME
Refills: 0 | Status: DISCONTINUED | OUTPATIENT
Start: 2024-09-30 | End: 2024-09-30

## 2024-09-30 RX ORDER — DEXMEDETOMIDINE HYDROCHLORIDE IN SODIUM CHLORIDE 4 UG/ML
0.2 INJECTION INTRAVENOUS
Qty: 400 | Refills: 0 | Status: DISCONTINUED | OUTPATIENT
Start: 2024-09-30 | End: 2024-10-02

## 2024-09-30 RX ORDER — SODIUM CHLORIDE 9 G/1000ML
1000 INJECTION, SOLUTION INTRAVENOUS
Refills: 0 | Status: DISCONTINUED | OUTPATIENT
Start: 2024-09-30 | End: 2024-09-30

## 2024-09-30 RX ORDER — DEXMEDETOMIDINE HYDROCHLORIDE IN SODIUM CHLORIDE 4 UG/ML
0.2 INJECTION INTRAVENOUS
Qty: 200 | Refills: 0 | Status: DISCONTINUED | OUTPATIENT
Start: 2024-09-30 | End: 2024-09-30

## 2024-09-30 RX ORDER — POTASSIUM PHOSPHATE, MONOBASIC POTASSIUM PHOSPHATE, DIBASIC INJECTION, 236; 224 MG/ML; MG/ML
30 SOLUTION, CONCENTRATE INTRAVENOUS ONCE
Refills: 0 | Status: COMPLETED | OUTPATIENT
Start: 2024-09-30 | End: 2024-09-30

## 2024-09-30 RX ORDER — MIDAZOLAM IN 0.9 % SOD.CHLORID 1 MG/ML
0.02 PLASTIC BAG, INJECTION (ML) INTRAVENOUS
Qty: 100 | Refills: 0 | Status: DISCONTINUED | OUTPATIENT
Start: 2024-09-30 | End: 2024-09-30

## 2024-09-30 RX ADMIN — Medication 10 MILLIGRAM(S): at 09:06

## 2024-09-30 RX ADMIN — Medication 40 MILLIEQUIVALENT(S): at 17:00

## 2024-09-30 RX ADMIN — Medication 40 MILLIEQUIVALENT(S): at 16:02

## 2024-09-30 RX ADMIN — SODIUM CHLORIDE 30 MILLILITER(S): 3 INJECTION, SOLUTION INTRAVENOUS at 21:48

## 2024-09-30 RX ADMIN — NALOXONE HYDROCHLORIDE 0.4 MILLIGRAM(S): 0.4 INJECTION, SOLUTION INTRAMUSCULAR; INTRAVENOUS; SUBCUTANEOUS at 08:45

## 2024-09-30 RX ADMIN — PROPOFOL 2.4 MICROGRAM(S)/KG/MIN: 10 INJECTION, EMULSION INTRAVENOUS at 11:02

## 2024-09-30 RX ADMIN — Medication 20 MILLIGRAM(S): at 08:56

## 2024-09-30 RX ADMIN — DEXMEDETOMIDINE HYDROCHLORIDE IN SODIUM CHLORIDE 4 MICROGRAM(S)/KG/HR: 4 INJECTION INTRAVENOUS at 21:45

## 2024-09-30 RX ADMIN — Medication 25 MG/HR: at 11:43

## 2024-09-30 RX ADMIN — POTASSIUM PHOSPHATE, MONOBASIC POTASSIUM PHOSPHATE, DIBASIC INJECTION, 83.33 MILLIMOLE(S): 236; 224 SOLUTION, CONCENTRATE INTRAVENOUS at 16:02

## 2024-09-30 RX ADMIN — Medication 15 MILLILITER(S): at 17:00

## 2024-09-30 RX ADMIN — SODIUM CHLORIDE 80 MILLILITER(S): 9 INJECTION, SOLUTION INTRAVENOUS at 11:44

## 2024-09-30 RX ADMIN — PROPOFOL 50 MILLIGRAM(S): 10 INJECTION, EMULSION INTRAVENOUS at 09:10

## 2024-09-30 RX ADMIN — INSULIN LISPRO 2: 100 INJECTION, SOLUTION INTRAVENOUS; SUBCUTANEOUS at 11:41

## 2024-09-30 RX ADMIN — Medication 40 MILLIGRAM(S): at 11:43

## 2024-09-30 RX ADMIN — Medication 1 APPLICATION(S): at 13:21

## 2024-09-30 RX ADMIN — Medication 25 MG/HR: at 11:00

## 2024-09-30 RX ADMIN — PROPOFOL 50 MILLIGRAM(S): 10 INJECTION, EMULSION INTRAVENOUS at 09:07

## 2024-09-30 RX ADMIN — AMLODIPINE BESYLATE 10 MILLIGRAM(S): 10 TABLET ORAL at 18:00

## 2024-09-30 RX ADMIN — Medication 100 MILLIGRAM(S): at 08:57

## 2024-09-30 RX ADMIN — SODIUM CHLORIDE 750 MILLILITER(S): 3 INJECTION, SOLUTION INTRAVENOUS at 19:15

## 2024-09-30 RX ADMIN — PROPOFOL 2.4 MICROGRAM(S)/KG/MIN: 10 INJECTION, EMULSION INTRAVENOUS at 11:42

## 2024-09-30 RX ADMIN — PROPOFOL 2.4 MICROGRAM(S)/KG/MIN: 10 INJECTION, EMULSION INTRAVENOUS at 16:22

## 2024-09-30 RX ADMIN — Medication 100 GRAM(S): at 14:51

## 2024-09-30 RX ADMIN — Medication 1.6 MG/KG/HR: at 10:59

## 2024-09-30 RX ADMIN — PROPOFOL 80 MILLIGRAM(S): 10 INJECTION, EMULSION INTRAVENOUS at 09:34

## 2024-09-30 RX ADMIN — Medication 25 MG/HR: at 20:03

## 2024-09-30 NOTE — ED PROVIDER NOTE - OBJECTIVE STATEMENT
Patient brought in by ambulance for AMS. 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 Patient has not regained consciousness since. Coworkers told EMS that patient has h/o MIs and a stroke. Do not know medication or drug history.

## 2024-09-30 NOTE — H&P ADULT - HISTORY OF PRESENT ILLNESS
Unknown age male, unknown past medical history (reported stroke and MI by coworkers) presented to [facility] with AMS, Pt was working at [employer] when he was found down by coworkers. EMS called and pt brought to [facility] ED. Intubated, sedated, started on cardene for SBPs in 200s. CT head showed brain stem bleed. Transferred to NSICU for further management.

## 2024-09-30 NOTE — ED ADULT TRIAGE NOTE - ISOLATION TYPE:
Patient Instructions:  It was a pleasure to see you in the cardiology clinic today.      If you have any questions, call  Pratik Iniguez RN, at [phone].   [facility].  To schedule an appointment or to leave a message for your Care Team Press #1  If you are a physician calling for another physician Press #2  For Billing Press #3  For Medical Records Press #4  We are encouraging the use of [employer] to communicate with your HealthCare Provider    Note the new medications: none  Stop the following medications: none    The results from today include: none  Please follow up with Dr. Huitron in 4 months      If you have an urgent need after hours (8:00 am to 4:30 pm) please call [phone] and ask for the cardiology fellow on call.     None

## 2024-09-30 NOTE — PROGRESS NOTE ADULT - ASSESSMENT
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 CPAP as tolerated  ABG, CXR  VAP bundle  ABG AM    CV:  SBP goal 100-160  Wean cardene  Start PO antiHTN once enteric access  Katt      RENAL:   ANIKA though suspect CKD  Fluids: LR at 80cc/hr; start 3%@30 HTS  Vuong catheter in place; monitor Is/Os  Check UA, urine Na, urine Cr, CPK  Renal US  Na goal 145-155  [id number]    GI:  Diet: NPO  GI prophylaxis [] not indicated [x] PPI [] other:  Bowel regimen [] colace [x] senna [] other: miralax  Monitor LFTs    ENDO:   Goal euglycemia (-180)      HEME/ONC:  VTE prophylaxis: [x] SCDs [] chemoprophylaxis  [x] hold chemoprophylaxis due to: acute heme    ID:   Mild leukocytosis  Afebrile

## 2024-09-30 NOTE — H&P ADULT - NSHPLABSRESULTS_GEN_ALL_CORE
< from: CT Brain Stroke Protocol (09.30.24 @ 09:19) >      FINDINGS:    Large pontine hemorrhage with subarachnoid extension. Edema withinthe   nabil causing effacement of the fourth ventricle. No hydrocephalus as of   yet. Chronic small vessel ischemic changes in the white matter with old   infarcts in the left external capsule, left caudate nucleus, the right   lentiform nucleus. No extra-axial collection or midline shift. No CT   evidence of an acute ischemic stroke.    IMPRESSION:  1. Large pontine hemorrhage with subarachnoid extension. Edema causes   effacement of the cerebral aqueduct and fourth ventricle without   hydrocephalus at this time.    2. Multiple old infarcts as above on a background of chronic small vessel   ischemic changes.    Dr. Carvajal was informed of the findings by telephone at 9:30 AM on 9/30/24.    --- End of Report ---      < end of copied text >

## 2024-09-30 NOTE — PROGRESS NOTE ADULT - SUBJECTIVE AND OBJECTIVE BOX
NEUROCRITICAL CARE EVENING NOTE    DAY EVENTS:    - admitted      VITALS/IMAGING/DATA  - Reviewed    ALLERGIES:   - Reviewed      MEDICATIONS:  - Reviewed    Physical Exam:  Unchanged from day time, please refer to note.

## 2024-09-30 NOTE — PROGRESS NOTE ADULT - ASSESSMENT
ASSESSMENT/PLAN:    NEURO:  Activity: [] mobilize as tolerated [] Bedrest [] PT [] OT [] PMNR    PULM:    CV:  SBP goal    RENAL:  Fluids:    GI:  Diet:  GI prophylaxis [] not indicated [] PPI [] other:  Bowel regimen [] colace [] senna [] other:    ENDO:   Goal euglycemia (-180)    HEME/ONC:  VTE prophylaxis: [] SCDs [] chemoprophylaxis [] hold chemoprophylaxis due to: [] high risk of DVT/PE on admission due to:    ID:    MISC:    SOCIAL/FAMILY:  [] awaiting [] updated at bedside [] family meeting    CODE STATUS:  [] Full Code [] DNR [] DNI [] Palliative/Comfort Care    DISPOSITION:  [] ICU [] Stroke Unit [] Floor [] EMU [] RCU [] PCU      Time spent: ___ [] critical care minutes   ASSESSMENT/PLAN:  73 y/o M with 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 DC versed  Analgesia: Fentanyl prn  Stroke neurology consult  Stroke core measures  Will need MRI brain  Send Utox  Activity: [] mobilize as tolerated [c] Bedrest [] PT [] OT [] PMNR    PULM: Intubated  Full vent support. CPAP as tolerated  ABG, CXR  VAP bundle    CV:  SBP goal 100-160  Wean cardene  Start PO antiHTN once enteric access  Katt  Baseline EKG, TTE     RENAL: ANIKA though suspect CKD  Fluids: LR at 80cc/hr  Vuong catheter in place; monitor Is/Os  Check UA, urine Na, urine Cr, CPK  Renal US  Na goal 140-150    GI:  Diet: NPO  GI prophylaxis [] not indicated [x] PPI [] other:  Bowel regimen [] colace [x] senna [] other: miralax  Monitor LFTs    ENDO:   Goal euglycemia (-180)  A1c, TSH, lipid profile    HEME/ONC:  VTE prophylaxis: [x] SCDs [] chemoprophylaxis  [x] hold chemoprophylaxis due to: acute heme    ID:   Mild leukocytosis  Afebrile    MISC:    SOCIAL/FAMILY:  [x] awaiting [] updated at bedside [] family meeting    CODE STATUS:  [x] Full Code [] DNR [] DNI [] Palliative/Comfort Care    DISPOSITION:  [x] ICU [] Stroke Unit [] Floor [] EMU [] RCU [] PCU    Time spent: 60 critical care minutes

## 2024-09-30 NOTE — H&P ADULT - NSHPPHYSICALEXAM_GEN_ALL_CORE
Constitutional: Sedated, lying on stretcher.   HEENT: Pupils equal, round, reactive to light.   Respiratory: +Intubated. No respiratory distress..   Cardiovascular: RRR, S1, S2.   Gastrointestinal: Abdomen soft, non tender, nondistended.  Neurological:  AAOX0. Does not open eyes, does not follow commands.   Motor: extensor posturing in b/l UE. trace TF in LLE, trace w/d in RLE  Pronator Drift: unable to assess  Extremities: Warm, well perfused. Purpura on L shin   Wounds: none

## 2024-09-30 NOTE — H&P ADULT - ASSESSMENT
Unknown age male, unknown past medical history (reported stroke and MI by coworkers) presented to [facility] with AMS, Pt was working at "[employer] when he was found down by coworkers. EMS called and pt brought to [facility] ED. Intubated, sedated, started on cardene for SBPs in 200s. CT head showed brain stem bleed. Transferred to NSICU for further management.

## 2024-09-30 NOTE — ED ADULT NURSE REASSESSMENT NOTE - NS ED NURSE REASSESS COMMENT FT1
At 10:32am , 39.5mL Versed drip remaining with EMS, confirmed with Mamta JAY. 71.1 mL propofol remaining in bag with EMS, confirmed with Mamta JAY.

## 2024-09-30 NOTE — ED PROVIDER NOTE - PHYSICAL EXAMINATION
Gen: Ill-appearing, vomiting, sonorous breathing. HEENT: NCAT, mmm, pinpoint pupils  Chest: RRR, nl S1 and S2, no m/r/g. Resp: CTAB, no w/r/r  Abd: nl BS, soft, nt/nd. Ext: Warm, dry  Neuro: pinpoint pupils, no facial droop, intact gag reflex. decorticate posturing to b/l arms, no movement of legs to pain.

## 2024-09-30 NOTE — ED ADULT TRIAGE NOTE - CHIEF COMPLAINT QUOTE
brought in by EMS from street; AMS; no notification PTA; large amount of saliva noted on clothing; ems says "he passed out on work site; hx faustina and strokes"; ems unable to get vitals on patient PTA or any other information; unknown drug or ETOH use; brought straight into room 11 upgraded for care

## 2024-09-30 NOTE — ED ADULT NURSE NOTE - OBJECTIVE STATEMENT
Patient is 72 year old male c/o AMS . Pt BIBEMS from work site, as per EMS pt collapsed at work. Upon arrival pt vomitting and obtunded. Patient unresponsive to physical or verbal stimuli. Patient clinically upgraded. Dr. Carvajal and Sukhdeep Basurot at bedside. Per wife, pt has hx of MI and stroke.

## 2024-09-30 NOTE — ED PROVIDER NOTE - PROGRESS NOTE DETAILS
No Response to narcan, Patient showing decorticate posturing to b/l upper extremities. Code stroke called 08:48. Patient intubated for airway protection. Imaging delayed due to need for intubation and for blood pressure control. CT head showed brainstem hemorrhage. I called [facility], who connected me with Dr. Manzo, neurosurgery attending. He accepted patient and asked me to give signout to Dr. Hurst in neuro ICU, which I did. Coworkers report patient does not have family. They do not know date of birth.  is an estimate. No medication history available.

## 2024-09-30 NOTE — H&P ADULT - PROBLEM SELECTOR PLAN 1
- neuro/vitals q1  - stability scan in 4-6 hours  - sedated on propofol for RASS -2 to -3  - fentanyl for pain control  - cardene gtt prn for SBP <160

## 2024-09-30 NOTE — ED PROVIDER NOTE - CLINICAL SUMMARY MEDICAL DECISION MAKING FREE TEXT BOX
Patient brought in by ambulance for AMS, narcan given due to pinpoint pupils without response. Code stroke called. 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 Admitted to neuro ICU at [facility].

## 2024-09-30 NOTE — ED PROVIDER NOTE - NS ED MD DISPO DIVISION
----- Message from COLLEEN Patton sent at 10/30/2023  1:02 PM CDT -----  Please call her and let her know that her pancreas is normal on CT. The rest of her guts are ok as well. Make sure she is feeling better.    [facility]

## 2024-10-01 DIAGNOSIS — Z71.89 OTHER SPECIFIED COUNSELING: ICD-10-CM

## 2024-10-01 DIAGNOSIS — Z51.5 ENCOUNTER FOR PALLIATIVE CARE: ICD-10-CM

## 2024-10-01 DIAGNOSIS — R53.2 FUNCTIONAL QUADRIPLEGIA: ICD-10-CM

## 2024-10-01 DIAGNOSIS — G93.40 ENCEPHALOPATHY, UNSPECIFIED: ICD-10-CM

## 2024-10-01 LAB
A1C WITH ESTIMATED AVERAGE GLUCOSE RESULT: 5.3 % — SIGNIFICANT CHANGE UP (ref 4–5.6)
ANION GAP SERPL CALC-SCNC: 8 MMOL/L — SIGNIFICANT CHANGE UP (ref 5–17)
ANION GAP SERPL CALC-SCNC: 9 MMOL/L — SIGNIFICANT CHANGE UP (ref 5–17)
ANION GAP SERPL CALC-SCNC: 9 MMOL/L — SIGNIFICANT CHANGE UP (ref 5–17)
BASE EXCESS BLDA CALC-SCNC: -7.9 MMOL/L — LOW (ref -2–3)
BUN SERPL-MCNC: 36 MG/DL — HIGH (ref 7–23)
BUN SERPL-MCNC: 38 MG/DL — HIGH (ref 7–23)
BUN SERPL-MCNC: 38 MG/DL — HIGH (ref 7–23)
CALCIUM SERPL-MCNC: 7.6 MG/DL — LOW (ref 8.4–10.5)
CALCIUM SERPL-MCNC: 7.8 MG/DL — LOW (ref 8.4–10.5)
CALCIUM SERPL-MCNC: 7.8 MG/DL — LOW (ref 8.4–10.5)
CHLORIDE SERPL-SCNC: 122 MMOL/L — HIGH (ref 96–108)
CHLORIDE SERPL-SCNC: 122 MMOL/L — HIGH (ref 96–108)
CHLORIDE SERPL-SCNC: 123 MMOL/L — HIGH (ref 96–108)
CO2 BLDA-SCNC: 18 MMOL/L — LOW (ref 19–24)
CO2 SERPL-SCNC: 17 MMOL/L — LOW (ref 22–31)
CO2 SERPL-SCNC: 18 MMOL/L — LOW (ref 22–31)
CO2 SERPL-SCNC: 18 MMOL/L — LOW (ref 22–31)
CREAT SERPL-MCNC: 3.35 MG/DL — HIGH (ref 0.5–1.3)
CREAT SERPL-MCNC: 3.38 MG/DL — HIGH (ref 0.5–1.3)
CREAT SERPL-MCNC: 3.59 MG/DL — HIGH (ref 0.5–1.3)
EGFR: 18 ML/MIN/1.73M2 — LOW
EGFR: 18 ML/MIN/1.73M2 — LOW
EGFR: 19 ML/MIN/1.73M2 — LOW
EGFR: 19 ML/MIN/1.73M2 — LOW
EGFR: 20 ML/MIN/1.73M2 — LOW
EGFR: 20 ML/MIN/1.73M2 — LOW
ESTIMATED AVERAGE GLUCOSE: 105 MG/DL — SIGNIFICANT CHANGE UP (ref 68–114)
GAS PNL BLDA: SIGNIFICANT CHANGE UP
GLUCOSE BLDC GLUCOMTR-MCNC: 113 MG/DL — HIGH (ref 70–99)
GLUCOSE BLDC GLUCOMTR-MCNC: 90 MG/DL — SIGNIFICANT CHANGE UP (ref 70–99)
GLUCOSE BLDC GLUCOMTR-MCNC: 97 MG/DL — SIGNIFICANT CHANGE UP (ref 70–99)
GLUCOSE SERPL-MCNC: 103 MG/DL — HIGH (ref 70–99)
GLUCOSE SERPL-MCNC: 111 MG/DL — HIGH (ref 70–99)
GLUCOSE SERPL-MCNC: 117 MG/DL — HIGH (ref 70–99)
HCO3 BLDA-SCNC: 17 MMOL/L — LOW (ref 21–28)
HCT VFR BLD CALC: 37.9 % — LOW (ref 39–50)
HGB BLD-MCNC: 11.9 G/DL — LOW (ref 13–17)
LACTATE SERPL-SCNC: 0.5 MMOL/L — SIGNIFICANT CHANGE UP (ref 0.5–2)
MAGNESIUM SERPL-MCNC: 2.3 MG/DL — SIGNIFICANT CHANGE UP (ref 1.6–2.6)
MCHC RBC-ENTMCNC: 27.5 PG — SIGNIFICANT CHANGE UP (ref 27–34)
MCHC RBC-ENTMCNC: 31.4 GM/DL — LOW (ref 32–36)
MCV RBC AUTO: 87.7 FL — SIGNIFICANT CHANGE UP (ref 80–100)
NRBC # BLD: 0 /100 WBCS — SIGNIFICANT CHANGE UP (ref 0–0)
NRBC BLD-RTO: 0 /100 WBCS — SIGNIFICANT CHANGE UP (ref 0–0)
PCO2 BLDA: 32 MMHG — LOW (ref 35–48)
PH BLDA: 7.33 — LOW (ref 7.35–7.45)
PHOSPHATE SERPL-MCNC: 3.8 MG/DL — SIGNIFICANT CHANGE UP (ref 2.5–4.5)
PLATELET # BLD AUTO: 206 K/UL — SIGNIFICANT CHANGE UP (ref 150–400)
PO2 BLDA: 159 MMHG — HIGH (ref 83–108)
POTASSIUM SERPL-MCNC: 3.9 MMOL/L — SIGNIFICANT CHANGE UP (ref 3.5–5.3)
POTASSIUM SERPL-MCNC: 4.6 MMOL/L — SIGNIFICANT CHANGE UP (ref 3.5–5.3)
POTASSIUM SERPL-MCNC: 4.7 MMOL/L — SIGNIFICANT CHANGE UP (ref 3.5–5.3)
POTASSIUM SERPL-SCNC: 3.9 MMOL/L — SIGNIFICANT CHANGE UP (ref 3.5–5.3)
POTASSIUM SERPL-SCNC: 4.6 MMOL/L — SIGNIFICANT CHANGE UP (ref 3.5–5.3)
POTASSIUM SERPL-SCNC: 4.7 MMOL/L — SIGNIFICANT CHANGE UP (ref 3.5–5.3)
RBC # BLD: 4.32 M/UL — SIGNIFICANT CHANGE UP (ref 4.2–5.8)
RBC # FLD: 13.2 % — SIGNIFICANT CHANGE UP (ref 10.3–14.5)
SAO2 % BLDA: 98.9 % — HIGH (ref 94–98)
SODIUM SERPL-SCNC: 147 MMOL/L — HIGH (ref 135–145)
SODIUM SERPL-SCNC: 149 MMOL/L — HIGH (ref 135–145)
SODIUM SERPL-SCNC: 150 MMOL/L — HIGH (ref 135–145)
WBC # BLD: 8.07 K/UL — SIGNIFICANT CHANGE UP (ref 3.8–10.5)
WBC # FLD AUTO: 8.07 K/UL — SIGNIFICANT CHANGE UP (ref 3.8–10.5)

## 2024-10-01 PROCEDURE — 99223 1ST HOSP IP/OBS HIGH 75: CPT

## 2024-10-01 PROCEDURE — 71045 X-RAY EXAM CHEST 1 VIEW: CPT | Mod: 26

## 2024-10-01 PROCEDURE — 99291 CRITICAL CARE FIRST HOUR: CPT

## 2024-10-01 PROCEDURE — 74018 RADEX ABDOMEN 1 VIEW: CPT | Mod: 26

## 2024-10-01 PROCEDURE — 76770 US EXAM ABDO BACK WALL COMP: CPT | Mod: 26

## 2024-10-01 PROCEDURE — 99232 SBSQ HOSP IP/OBS MODERATE 35: CPT

## 2024-10-01 RX ORDER — SODIUM CHLORIDE 9 G/1000ML
1000 INJECTION, SOLUTION INTRAVENOUS
Refills: 0 | Status: DISCONTINUED | OUTPATIENT
Start: 2024-10-01 | End: 2024-10-01

## 2024-10-01 RX ORDER — SODIUM CHLORIDE 3 G/100ML
1000 INJECTION, SOLUTION INTRAVENOUS
Refills: 0 | Status: DISCONTINUED | OUTPATIENT
Start: 2024-10-01 | End: 2024-10-02

## 2024-10-01 RX ORDER — ACETAMINOPHEN 500 MG/5ML
1000 LIQUID (ML) ORAL ONCE
Refills: 0 | Status: COMPLETED | OUTPATIENT
Start: 2024-10-01 | End: 2024-10-01

## 2024-10-01 RX ORDER — SODIUM CHLORIDE 9 G/1000ML
1000 INJECTION, SOLUTION INTRAVENOUS
Refills: 0 | Status: DISCONTINUED | OUTPATIENT
Start: 2024-10-01 | End: 2024-10-04

## 2024-10-01 RX ORDER — LABETALOL HYDROCHLORIDE 200 MG/1
100 TABLET, FILM COATED ORAL EVERY 8 HOURS
Refills: 0 | Status: DISCONTINUED | OUTPATIENT
Start: 2024-10-01 | End: 2024-10-02

## 2024-10-01 RX ADMIN — SODIUM CHLORIDE 30 MILLILITER(S): 3 INJECTION, SOLUTION INTRAVENOUS at 06:24

## 2024-10-01 RX ADMIN — Medication 1 APPLICATION(S): at 07:00

## 2024-10-01 RX ADMIN — Medication 40 MILLIGRAM(S): at 11:09

## 2024-10-01 RX ADMIN — Medication 25 MICROGRAM(S): at 14:27

## 2024-10-01 RX ADMIN — Medication 400 MILLIGRAM(S): at 17:55

## 2024-10-01 RX ADMIN — Medication 25 MG/HR: at 06:23

## 2024-10-01 RX ADMIN — Medication 15 MILLILITER(S): at 17:16

## 2024-10-01 RX ADMIN — Medication 15 MILLILITER(S): at 06:11

## 2024-10-01 RX ADMIN — DEXMEDETOMIDINE HYDROCHLORIDE IN SODIUM CHLORIDE 4 MICROGRAM(S)/KG/HR: 4 INJECTION INTRAVENOUS at 06:24

## 2024-10-01 RX ADMIN — SODIUM CHLORIDE 50 MILLILITER(S): 9 INJECTION, SOLUTION INTRAVENOUS at 13:51

## 2024-10-01 RX ADMIN — SODIUM CHLORIDE 30 MILLILITER(S): 3 INJECTION, SOLUTION INTRAVENOUS at 17:17

## 2024-10-01 RX ADMIN — Medication 25 MG/HR: at 17:14

## 2024-10-01 RX ADMIN — LABETALOL HYDROCHLORIDE 100 MILLIGRAM(S): 200 TABLET, FILM COATED ORAL at 13:17

## 2024-10-01 RX ADMIN — Medication 1000 MILLIGRAM(S): at 18:15

## 2024-10-01 RX ADMIN — LABETALOL HYDROCHLORIDE 100 MILLIGRAM(S): 200 TABLET, FILM COATED ORAL at 09:57

## 2024-10-01 RX ADMIN — AMLODIPINE BESYLATE 10 MILLIGRAM(S): 10 TABLET ORAL at 06:11

## 2024-10-01 RX ADMIN — Medication 25 MICROGRAM(S): at 21:08

## 2024-10-01 RX ADMIN — Medication 25 MICROGRAM(S): at 14:07

## 2024-10-01 RX ADMIN — POLYETHYLENE GLYCOL 3350 17 GRAM(S): 17 POWDER, FOR SOLUTION ORAL at 11:09

## 2024-10-01 NOTE — PROGRESS NOTE ADULT - SUBJECTIVE AND OBJECTIVE BOX
HPI:  Unknown age male, unknown past medical history (reported stroke and MI by coworkers) presented to [facility] with AMS, Pt was working at [employer] when he was found down by coworkers. EMS called and pt brought to [facility] ED. Intubated, sedated, started on cardene for SBPs in 200s. CT head showed brain stem bleed. Transferred to NSICU for further management.  (30 Sep 2024 12:55)    Ethics:  Ethics consult initiated to determine surrogate decision maker for patient and case discussed with Rina Teran PA-C.    Ethics spoke at length with Rizwan Oh, patient's uncle, using  services. Patient has a living father (in [place]), sister (in [place]), son (in [place]), and daughter (in [place]). Patient's father lives with patient's sister. Rizwan does not have contact info for patient's daughter and patient's mother's status is unknown.    Ethics left voicemails for patient's sister and son using  services. Ethics awaits callback.    Full consult to follow.    Gely Fernandez MS, [facility]  Ethics Attending  [phone]

## 2024-10-01 NOTE — CONSULT NOTE ADULT - ASSESSMENT
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 Patient is currently intubated, off sedation and non-responsive on physical exam. Palliative is consulted to assist with GOC conversation given guarded prognosis.     Patient lives with roommate in [place], but most of his family lives in [place], except for uncle in [place]. Per roommate has contact with family regularly. Primary team and ethics team are assisting on identifying family members and contact information. As of now, per chart review patient has living father (in [place]), sister (in [place]), son (in [place]), and daughter (in [place]), however the age of children in not known at this time and as per primary team it is unclear is the patient has been in contact with them.     Plan:   -Palliative team will continue discussions with ethics and primary team, as the family members and surrogate decision makers are identified. At that time, palliative team will reach out to them directly to discuss the current diagnosis, treatment plan, and guarded prognosis.         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 Patient is currently intubated, off sedation and non-responsive on physical exam. Palliative is consulted to assist with GOC conversation given guarded prognosis.     Patient lives with roommate in [place], but most of his family lives in [place], except for uncle in [place]. Per roommate has contact with family regularly. Primary team and ethics team are assisting on identifying family members and contact information. As of now, per chart review patient has living father (in [place]), sister (in [place]), son (in [place]), and daughter (in [place]), however the age of children in not known at this time and as per primary team it is unclear is the patient has been in contact with them.     Plan:   -Palliative team will continue discussions with ethics and primary team, as the family members and surrogate decision makers are identified. At that time, palliative team will reach out to them directly to discuss the current diagnosis, treatment plan, and guarded prognosis.         Kaylene Rowley MD PGY-2    Recommendations are not considered final until attending attestation

## 2024-10-01 NOTE — PROGRESS NOTE ADULT - SUBJECTIVE AND OBJECTIVE BOX
S/Overnight events:  Pt seen and examined in room, unable to offer any complaints at this time.       Hospital Course:   9/30: transferred from [facility]. A line placed. Arben Rader at bedside, states pt has family in [place], cannot confirm medications or PMH other than stroke and MI. 250cc bolus 3% given. LR switched to NS. hydralazine 25q8 started, 3% started, switched propofol to precedex   10/1: stability CTH done      Vital Signs Last 24 Hrs  T(C): 37 (30 Sep 2024 22:15), Max: 37 (30 Sep 2024 11:08)  T(F): 98.6 (30 Sep 2024 22:15), Max: 98.6 (30 Sep 2024 11:08)  HR: 89 (30 Sep 2024 21:58) (66 - 136)  BP: 139/88 (30 Sep 2024 21:58) (115/58 - 253/110)  BP(mean): 106 (30 Sep 2024 21:58) (80 - 131)  RR: 12 (30 Sep 2024 21:58) (12 - 35)  SpO2: 100% (30 Sep 2024 21:58) (97% - 100%)    Parameters below as of 30 Sep 2024 21:58  Patient On (Oxygen Delivery Method): ventilator    O2 Concentration (%): 40    I&O's Detail    30 Sep 2024 07:01  -  01 Oct 2024 00:11  --------------------------------------------------------  IN:    IV PiggyBack: 416.7 mL    IV PiggyBack: 350 mL    Lactated Ringers: 560 mL    NiCARdipine: 175 mL    Propofol: 158.4 mL    sodium chloride 0.9%: 240 mL  Total IN: 1900.1 mL    OUT:    Indwelling Catheter - Urethral (mL): 1305 mL  Total OUT: 1305 mL    Total NET: 595.1 mL        I&O's Summary    30 Sep 2024 07:01  -  01 Oct 2024 00:11  --------------------------------------------------------  IN: 1900.1 mL / OUT: 1305 mL / NET: 595.1 mL        PHYSICAL EXAM:  Constitutional: Pt found in bed in NAD   ENT: PERRL   Respiratory: breathing non-labored, symmetrical chest wall movement  Cardiovascuar: RRR, no murmurs  Gastrointestinal: abdomen soft, non tender  Neurological:  AAOX0. Not FC, Not OE  +cough/gag/corneals  Motor: UE extensor to noxious b/l, LLE triple flexes to noxious, RLE withdraws from noxious   Sensation: unable to assess   Pronator Drift: unable to assess     TUBES/LINES:  [] CVC  [x] A-line  [] Lumbar Drain  [] Ventriculostomy  [x] Gabriel  [] Other    DIET:  [x] NPO  [] Mechanical  [] Tube feeds    LABS:                        13.6   14.87 )-----------( 245      ( 30 Sep 2024 11:25 )             41.2     09-30    140  |  107  |  45[H]  ----------------------------<  155[H]  3.1[L]   |  17[L]  |  3.30[H]    Ca    8.0[L]      30 Sep 2024 11:25  Phos  2.1     09-30  Mg     1.9     09-30    TPro  6.7  /  Alb  3.6  /  TBili  0.5  /  DBili  x   /  AST  14  /  ALT  13  /  AlkPhos  143[H]  09-30    PT/INR - ( 30 Sep 2024 08:56 )   PT: 10.3 sec;   INR: 0.89          PTT - ( 30 Sep 2024 08:54 )  PTT:28.4 sec  Urinalysis Basic - ( 30 Sep 2024 11:25 )    Color: x / Appearance: x / SG: x / pH: x  Gluc: 155 mg/dL / Ketone: x  / Bili: x / Urobili: x   Blood: x / Protein: x / Nitrite: x   Leuk Esterase: x / RBC: x / WBC x   Sq Epi: x / Non Sq Epi: x / Bacteria: x          CAPILLARY BLOOD GLUCOSE      POCT Blood Glucose.: 122 mg/dL (30 Sep 2024 23:02)  POCT Blood Glucose.: 110 mg/dL (30 Sep 2024 17:10)  POCT Blood Glucose.: 184 mg/dL (30 Sep 2024 11:33)  POCT Blood Glucose.: 147 mg/dL (30 Sep 2024 08:46)      Drug Levels: [] N/A    CSF Analysis: [] N/A      Allergies    Allergy Status Unknown    Intolerances      MEDICATIONS:  Antibiotics:    Neuro:  acetaminophen   Oral Liquid .. 650 milliGRAM(s) Oral every 6 hours PRN  dexMEDEtomidine Infusion 0.2 MICROgram(s)/kG/Hr IV Continuous <Continuous>  fentaNYL    Injectable 25 MICROGram(s) IV Push every 2 hours PRN    Anticoagulation:    OTHER:  amLODIPine   Tablet 10 milliGRAM(s) Oral daily  chlorhexidine 0.12% Liquid 15 milliLiter(s) Oral Mucosa every 12 hours  chlorhexidine 2% Cloths 1 Application(s) Topical <User Schedule>  insulin lispro (ADMELOG) corrective regimen sliding scale   SubCutaneous every 6 hours  niCARdipine Infusion 5 mG/Hr IV Continuous <Continuous>  pantoprazole  Injectable 40 milliGRAM(s) IV Push daily  polyethylene glycol 3350 17 Gram(s) Oral daily  senna 2 Tablet(s) Oral at bedtime    IVF:  sodium chloride 0.9%. 1000 milliLiter(s) IV Continuous <Continuous>  sodium chloride 3%. 500 milliLiter(s) IV Continuous <Continuous>    CULTURES:    RADIOLOGY & ADDITIONAL TESTS:      ASSESSMENT:  45 y/o PMH ?stroke/MI present to [facility] after collapsing at work. Decorticate posturing, vomiting, intubated for airway protection. 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 Transferred to [facility] for further management    Neuro:  - neuro/vitals q1  - sedation: precedex gtt (RASS -2 - -3)  - pain control: fentanyl 25q2 prn  - stability scan 9/30: enlarged pontine hemorrhage, repeat done pending read   - stroke core measures   - stroke neuro following     CV:  - SBP<160, cardene gtt  - HTN: hydralazine 25q8  - hx MI  - echo (9/30) EF 75%    Resp:  - Intubated, FVS    GI:  - NPO  - bowel reg  - protonix while intubated    Renal:  - NS @ 80/hr   - +gabriel  - trend BUN/Cr: possible CKD?  - pending renal US  - Na 145-150, 3% 30cc/hr     Endo:  - ISS  - a1c 5.4    Heme:  - DVT ppx: SCDs    ID:  - afebrile    Dispo: NSICU, full code    D/w Dr. Manzo, Dr. Reveles

## 2024-10-01 NOTE — CONSULT NOTE ADULT - SUBJECTIVE AND OBJECTIVE BOX
**STROKE CONSULT NOTE**    HPI: Unknown age male, unknown past medical history (reported stroke and MI by coworkers) presented to [facility] with AMS, Pt was working at [employer] when he was found down by coworkers. EMS called and pt brought to [facility] ED. Intubated, sedated, started on cardene for SBPs in 200s. CT head showed brain stem bleed. Transferred to NSICU for further management.     9/30: transferred from [facility]. A line placed. Versed eric Rader at bedside, states pt has family in [place], cannot confirm medications or PMH other than stroke and MI. 250cc bolus 3% given. LR switched to NS. hydralazine 25q8 started, 3% started, switched propofol to precedex   10/1: stability CTH done    Stroke team consulted for hemorrhagic stroke workup.  Patient is currently intubated on ventilator on precedex    T(C): 37.2 (10-01-24 @ 04:30), Max: 37.6 (10-01-24 @ 01:03)  HR: 70 (10-01-24 @ 09:00) (70 - 136)  BP: 124/78 (10-01-24 @ 08:00) (115/58 - 170/103)  RR: 16 (10-01-24 @ 09:00) (12 - 32)  SpO2: 100% (10-01-24 @ 09:00) (97% - 100%)    PAST MEDICAL & SURGICAL HISTORY:  Acute myocardial infarction      CVA (cerebral vascular accident)      FAMILY HISTORY:    SOCIAL HISTORY:   EBER due to mental status    ROS: EBER due to mental status      MEDICATIONS  (STANDING):  amLODIPine   Tablet 10 milliGRAM(s) Oral daily  chlorhexidine 0.12% Liquid 15 milliLiter(s) Oral Mucosa every 12 hours  chlorhexidine 2% Cloths 1 Application(s) Topical <User Schedule>  dexMEDEtomidine Infusion 0.2 MICROgram(s)/kG/Hr (4 mL/Hr) IV Continuous <Continuous>  insulin lispro (ADMELOG) corrective regimen sliding scale   SubCutaneous every 6 hours  labetalol 100 milliGRAM(s) Enteral Tube every 8 hours  lactated ringers. 1000 milliLiter(s) (80 mL/Hr) IV Continuous <Continuous>  niCARdipine Infusion 5 mG/Hr (25 mL/Hr) IV Continuous <Continuous>  pantoprazole  Injectable 40 milliGRAM(s) IV Push daily  polyethylene glycol 3350 17 Gram(s) Oral daily  senna 2 Tablet(s) Oral at bedtime  sodium chloride 3% + sodium acetate 50:50 1000 milliLiter(s) (30 mL/Hr) IV Continuous <Continuous>    MEDICATIONS  (PRN):  acetaminophen   Oral Liquid .. 650 milliGRAM(s) Oral every 6 hours PRN Temp greater or equal to 38C (100.4F), Mild Pain (1 - 3)  fentaNYL    Injectable 25 MICROGram(s) IV Push every 2 hours PRN agitation, severe pain, vent desynchrony    Allergies    Allergy Status Unknown    Intolerances      Vital Signs Last 24 Hrs  T(C): 37.2 (01 Oct 2024 04:30), Max: 37.6 (01 Oct 2024 01:03)  T(F): 98.9 (01 Oct 2024 04:30), Max: 99.6 (01 Oct 2024 01:03)  HR: 70 (01 Oct 2024 09:00) (70 - 136)  BP: 124/78 (01 Oct 2024 08:00) (115/58 - 170/103)  BP(mean): 95 (01 Oct 2024 08:00) (80 - 131)  RR: 16 (01 Oct 2024 09:00) (12 - 32)  SpO2: 100% (01 Oct 2024 09:00) (97% - 100%)    Parameters below as of 01 Oct 2024 09:00  Patient On (Oxygen Delivery Method): ventilator    O2 Concentration (%): 40    Physical exam:  Constitutional: No acute distress, conversant  Eyes: Anicteric sclerae, moist conjunctivae, see below for CNs  Neck: trachea midline, FROM, supple, no thyromegaly or lymphadenopathy  Cardiovascular: Regular rate and rhythm, no murmurs, rubs, or gallops. No carotid bruits.   Pulmonary: on ventilator   GI: Abdomen soft, non-distended, non-tender  Extremities: Radial and DP pulses +2, no edema    Neurologic:  -Mental status: intubated on ventilator, comatose  -Cranial nerves:   II: EBER due to mental status  III, IV, VI: Extraocular movements are intact without nystagmus. Pupils equally round and sluggish  V:  corneal reflex absent b/l  VII: Face is symmetric with normal eye closure and smile  VIII: EBER  IX, X: Uvula is midline and soft palate rises symmetrically  XI: Head turning and shoulder shrug are intact.  XII: Gag reflex present  Motor: Normal bulk and tone. All extremties withdraw to noxious stimuli except LLE TR  Sensation: withdraw to noxious stimuli  Coordination: EBER  Reflexes: B/L feet 4 beats clonus  Gait: EBER    NIHSS:     Fingerstick Blood Glucose: CAPILLARY BLOOD GLUCOSE      POCT Blood Glucose.: 113 mg/dL (01 Oct 2024 06:20)    LABS:                        11.9   8.07  )-----------( 206      ( 01 Oct 2024 05:30 )             37.9     10-01    147[H]  |  122[H]  |  38[H]  ----------------------------<  111[H]  4.7   |  17[L]  |  3.35[H]    Ca    7.6[L]      01 Oct 2024 05:30  Phos  3.8     10-01  Mg     2.3     10-01    TPro  6.7  /  Alb  3.6  /  TBili  0.5  /  DBili  x   /  AST  14  /  ALT  13  /  AlkPhos  143[H]  09-30    PT/INR - ( 30 Sep 2024 08:56 )   PT: 10.3 sec;   INR: 0.89          PTT - ( 30 Sep 2024 08:54 )  PTT:28.4 sec      Urinalysis Basic - ( 01 Oct 2024 05:30 )    Color: x / Appearance: x / SG: x / pH: x  Gluc: 111 mg/dL / Ketone: x  / Bili: x / Urobili: x   Blood: x / Protein: x / Nitrite: x   Leuk Esterase: x / RBC: x / WBC x   Sq Epi: x / Non Sq Epi: x / Bacteria: x        RADIOLOGY & ADDITIONAL STUDIES:      -----------------------------------------------------------------------------------------------------------------  IV-tPA (Y/N):    ***                              Bolus time:    Tenecteplase Dose Verification w/ RN:  Reason thrombolytic not given: ***    **STROKE CONSULT NOTE**    HPI: Unknown age male, unknown past medical history (reported stroke and MI by coworkers) presented to [facility] with AMS, Pt was working at [employer] when he was found down by coworkers. EMS called and pt brought to [facility] ED. Intubated, sedated, started on cardene for SBPs in 200s. CT head showed brain stem bleed. Transferred to NSICU for further management.     9/30: transferred from [facility]. A line placed. Versed eric Rader at bedside, states pt has family in [place], cannot confirm medications or PMH other than stroke and MI. 250cc bolus 3% given. LR switched to NS. hydralazine 25q8 started, 3% started, switched propofol to precedex   10/1: stability CTH done    Stroke team consulted for hemorrhagic stroke workup.  Patient is currently intubated on ventilator on precedex, comatose mental status.     T(C): 37.2 (10-01-24 @ 04:30), Max: 37.6 (10-01-24 @ 01:03)  HR: 70 (10-01-24 @ 09:00) (70 - 136)  BP: 124/78 (10-01-24 @ 08:00) (115/58 - 170/103)  RR: 16 (10-01-24 @ 09:00) (12 - 32)  SpO2: 100% (10-01-24 @ 09:00) (97% - 100%)    PAST MEDICAL & SURGICAL HISTORY:  Acute myocardial infarction      CVA (cerebral vascular accident)      FAMILY HISTORY:    SOCIAL HISTORY:   EBER due to mental status    ROS: EBER due to mental status      MEDICATIONS  (STANDING):  amLODIPine   Tablet 10 milliGRAM(s) Oral daily  chlorhexidine 0.12% Liquid 15 milliLiter(s) Oral Mucosa every 12 hours  chlorhexidine 2% Cloths 1 Application(s) Topical <User Schedule>  dexMEDEtomidine Infusion 0.2 MICROgram(s)/kG/Hr (4 mL/Hr) IV Continuous <Continuous>  insulin lispro (ADMELOG) corrective regimen sliding scale   SubCutaneous every 6 hours  labetalol 100 milliGRAM(s) Enteral Tube every 8 hours  lactated ringers. 1000 milliLiter(s) (80 mL/Hr) IV Continuous <Continuous>  niCARdipine Infusion 5 mG/Hr (25 mL/Hr) IV Continuous <Continuous>  pantoprazole  Injectable 40 milliGRAM(s) IV Push daily  polyethylene glycol 3350 17 Gram(s) Oral daily  senna 2 Tablet(s) Oral at bedtime  sodium chloride 3% + sodium acetate 50:50 1000 milliLiter(s) (30 mL/Hr) IV Continuous <Continuous>    MEDICATIONS  (PRN):  acetaminophen   Oral Liquid .. 650 milliGRAM(s) Oral every 6 hours PRN Temp greater or equal to 38C (100.4F), Mild Pain (1 - 3)  fentaNYL    Injectable 25 MICROGram(s) IV Push every 2 hours PRN agitation, severe pain, vent desynchrony    Allergies    Allergy Status Unknown    Intolerances      Vital Signs Last 24 Hrs  T(C): 37.2 (01 Oct 2024 04:30), Max: 37.6 (01 Oct 2024 01:03)  T(F): 98.9 (01 Oct 2024 04:30), Max: 99.6 (01 Oct 2024 01:03)  HR: 70 (01 Oct 2024 09:00) (70 - 136)  BP: 124/78 (01 Oct 2024 08:00) (115/58 - 170/103)  BP(mean): 95 (01 Oct 2024 08:00) (80 - 131)  RR: 16 (01 Oct 2024 09:00) (12 - 32)  SpO2: 100% (01 Oct 2024 09:00) (97% - 100%)    Parameters below as of 01 Oct 2024 09:00  Patient On (Oxygen Delivery Method): ventilator    O2 Concentration (%): 40    Physical exam:  Constitutional: No acute distress, conversant  Eyes: Anicteric sclerae, moist conjunctivae, see below for CNs  Neck: trachea midline, FROM, supple, no thyromegaly or lymphadenopathy  Cardiovascular: Regular rate and rhythm, no murmurs, rubs, or gallops. No carotid bruits.   Pulmonary: on ventilator   GI: Abdomen soft, non-distended, non-tender  Extremities: Radial and DP pulses +2, no edema    Neurologic:  -Mental status: intubated on ventilator, comatose  -Cranial nerves:   II: EBER due to mental status  III, IV, VI: Extraocular movements are intact without nystagmus. Pupils equally round and sluggish  V:  corneal reflex absent b/l  VII: Face is symmetric with normal eye closure and smile  VIII: EBER  IX, X: Uvula is midline and soft palate rises symmetrically  XI: Head turning and shoulder shrug are intact.  XII: Gag reflex present  Motor: Normal bulk and tone. No spontaneous movement. B/L UE and RLE extremities withdraw to noxious stimuli except LLE TR  Sensation: withdraw to noxious stimuli  Coordination: EBER  Reflexes: B/L feet 4 beats clonus  Gait: EBER    NIHSS: 33    Fingerstick Blood Glucose: CAPILLARY BLOOD GLUCOSE      POCT Blood Glucose.: 113 mg/dL (01 Oct 2024 06:20)    LABS:                        11.9   8.07  )-----------( 206      ( 01 Oct 2024 05:30 )             37.9     10-01    147[H]  |  122[H]  |  38[H]  ----------------------------<  111[H]  4.7   |  17[L]  |  3.35[H]    Ca    7.6[L]      01 Oct 2024 05:30  Phos  3.8     10-01  Mg     2.3     10-01    TPro  6.7  /  Alb  3.6  /  TBili  0.5  /  DBili  x   /  AST  14  /  ALT  13  /  AlkPhos  143[H]  09-30    PT/INR - ( 30 Sep 2024 08:56 )   PT: 10.3 sec;   INR: 0.89          PTT - ( 30 Sep 2024 08:54 )  PTT:28.4 sec      Urinalysis Basic - ( 01 Oct 2024 05:30 )    Color: x / Appearance: x / SG: x / pH: x  Gluc: 111 mg/dL / Ketone: x  / Bili: x / Urobili: x   Blood: x / Protein: x / Nitrite: x   Leuk Esterase: x / RBC: x / WBC x   Sq Epi: x / Non Sq Epi: x / Bacteria: x        RADIOLOGY & ADDITIONAL STUDIES:      -----------------------------------------------------------------------------------------------------------------  IV-tPA (Y/N):    ***                              Bolus time:    Tenecteplase Dose Verification w/ RN:  Reason thrombolytic not given: ***    **STROKE CONSULT NOTE**    HPI: Unknown age male, unknown past medical history (reported stroke and MI by coworkers) presented to [facility] with AMS, Pt was working at [employer] when he was found down by coworkers. EMS called and pt brought to [facility] ED. Intubated, sedated, started on cardene for SBPs in 200s. CT head showed brain stem bleed. Transferred to NSICU for further management.     9/30: transferred from [facility]. A line placed. Versed eric Rader at bedside, states pt has family in [place], cannot confirm medications or PMH other than stroke and MI. 250cc bolus 3% given. LR switched to NS. hydralazine 25q8 started, 3% started, switched propofol to precedex   10/1: stability CTH done    Stroke team consulted for hemorrhagic stroke workup.  Patient is currently intubated on ventilator on precedex, comatose mental status.     T(C): 37.2 (10-01-24 @ 04:30), Max: 37.6 (10-01-24 @ 01:03)  HR: 70 (10-01-24 @ 09:00) (70 - 136)  BP: 124/78 (10-01-24 @ 08:00) (115/58 - 170/103)  RR: 16 (10-01-24 @ 09:00) (12 - 32)  SpO2: 100% (10-01-24 @ 09:00) (97% - 100%)    PAST MEDICAL & SURGICAL HISTORY:  Acute myocardial infarction      CVA (cerebral vascular accident)      FAMILY HISTORY:    SOCIAL HISTORY:   EBER due to mental status    ROS: EBER due to mental status      MEDICATIONS  (STANDING):  amLODIPine   Tablet 10 milliGRAM(s) Oral daily  chlorhexidine 0.12% Liquid 15 milliLiter(s) Oral Mucosa every 12 hours  chlorhexidine 2% Cloths 1 Application(s) Topical <User Schedule>  dexMEDEtomidine Infusion 0.2 MICROgram(s)/kG/Hr (4 mL/Hr) IV Continuous <Continuous>  insulin lispro (ADMELOG) corrective regimen sliding scale   SubCutaneous every 6 hours  labetalol 100 milliGRAM(s) Enteral Tube every 8 hours  lactated ringers. 1000 milliLiter(s) (80 mL/Hr) IV Continuous <Continuous>  niCARdipine Infusion 5 mG/Hr (25 mL/Hr) IV Continuous <Continuous>  pantoprazole  Injectable 40 milliGRAM(s) IV Push daily  polyethylene glycol 3350 17 Gram(s) Oral daily  senna 2 Tablet(s) Oral at bedtime  sodium chloride 3% + sodium acetate 50:50 1000 milliLiter(s) (30 mL/Hr) IV Continuous <Continuous>    MEDICATIONS  (PRN):  acetaminophen   Oral Liquid .. 650 milliGRAM(s) Oral every 6 hours PRN Temp greater or equal to 38C (100.4F), Mild Pain (1 - 3)  fentaNYL    Injectable 25 MICROGram(s) IV Push every 2 hours PRN agitation, severe pain, vent desynchrony    Allergies    Allergy Status Unknown    Intolerances      Vital Signs Last 24 Hrs  T(C): 37.2 (01 Oct 2024 04:30), Max: 37.6 (01 Oct 2024 01:03)  T(F): 98.9 (01 Oct 2024 04:30), Max: 99.6 (01 Oct 2024 01:03)  HR: 70 (01 Oct 2024 09:00) (70 - 136)  BP: 124/78 (01 Oct 2024 08:00) (115/58 - 170/103)  BP(mean): 95 (01 Oct 2024 08:00) (80 - 131)  RR: 16 (01 Oct 2024 09:00) (12 - 32)  SpO2: 100% (01 Oct 2024 09:00) (97% - 100%)    Parameters below as of 01 Oct 2024 09:00  Patient On (Oxygen Delivery Method): ventilator    O2 Concentration (%): 40    Physical exam:  Constitutional: No acute distress, conversant  Eyes: Anicteric sclerae, moist conjunctivae, see below for CNs  Neck: trachea midline, FROM, supple, no thyromegaly or lymphadenopathy  Cardiovascular: Regular rate and rhythm, no murmurs, rubs, or gallops. No carotid bruits.   Pulmonary: on ventilator   GI: Abdomen soft, non-distended, non-tender  Extremities: Radial and DP pulses +2, no edema    Neurologic:  -Mental status: intubated on ventilator, comatose  -Cranial nerves:   II: EBER due to mental status  III, IV, VI: Extraocular movements are intact. Pupils equally round and sluggish  V:  corneal reflex absent Left eye, Right corneal reflex present  VII: Face is symmetric with normal eye closure and smile  VIII: EBER  IX, X: Intubated with ET tube present, unable to assess uvula, Gag reflex present  XI: EBER  XII: EBER  Motor: Normal bulk and tone. No spontaneous movement. B/L UE and RLE extremities decerebrate posturing in response to noxious stimuli except LLE TR  Sensation: withdraw to noxious stimuli  Coordination: EBER  Reflexes: B/L feet 4 beats clonus  Gait: EBER    NIHSS: 33    Fingerstick Blood Glucose: CAPILLARY BLOOD GLUCOSE      POCT Blood Glucose.: 113 mg/dL (01 Oct 2024 06:20)    LABS:                        11.9   8.07  )-----------( 206      ( 01 Oct 2024 05:30 )             37.9     10-01    147[H]  |  122[H]  |  38[H]  ----------------------------<  111[H]  4.7   |  17[L]  |  3.35[H]    Ca    7.6[L]      01 Oct 2024 05:30  Phos  3.8     10-01  Mg     2.3     10-01    TPro  6.7  /  Alb  3.6  /  TBili  0.5  /  DBili  x   /  AST  14  /  ALT  13  /  AlkPhos  143[H]  09-30    PT/INR - ( 30 Sep 2024 08:56 )   PT: 10.3 sec;   INR: 0.89          PTT - ( 30 Sep 2024 08:54 )  PTT:28.4 sec      Urinalysis Basic - ( 01 Oct 2024 05:30 )    Color: x / Appearance: x / SG: x / pH: x  Gluc: 111 mg/dL / Ketone: x  / Bili: x / Urobili: x   Blood: x / Protein: x / Nitrite: x   Leuk Esterase: x / RBC: x / WBC x   Sq Epi: x / Non Sq Epi: x / Bacteria: x        RADIOLOGY & ADDITIONAL STUDIES: reviewed    < from: CT Head No Cont (09.30.24 @ 15:50) >  IMPRESSION:  As compared to prior CT head same day at 9:15 AM, 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  Mild posterior frontal lobe   subarachnoid hemorrhage. Mild posterior left frontal lobe subarachnoid   hemorrhage.    < end of copied text >    < from: CT Head No Cont (09.30.24 @ 23:41) >      IMPRESSION:  Since most recent prior exam, grossly stable appearance of pontine   hemorrhage with associated edema and mass effect of the fourth ventricle.   Stable posterior right frontal lobe and posterior left frontal lobe   subarachnoid hemorrhage.    < end of copied text >

## 2024-10-01 NOTE — PROGRESS NOTE ADULT - SUBJECTIVE AND OBJECTIVE BOX
[facility] -- ADDITIONAL PROGRESS NOTE    Nighttime rounds were performed       Patient is a 45 y/o male with unknown past medical history (reported stroke and MI by coworkers). He presented to [facility] with AMS. Prior to this, pt was working at [employer] when he was found down by coworkers. EMS called and pt brought to [facility] ED. Intubated, sedated, started on cardene for SBPs in 200s. CT head showed pontine bleed. Transferred to NSICU for further management.  (30 Sep 2024 12:55)      On admission, the patient was:  GCS:   Cash-Shane:  modified Robles:  ICH score: 3  NIHSS:    *** HIGH RISK OF DVT PRESENT ON ADMISSION ***      ICU Vital Signs Last 24 Hrs  T(C): 37.6 (01 Oct 2024 20:00), Max: 39.8 (01 Oct 2024 18:00)  T(F): 99.7 (01 Oct 2024 20:00), Max: 103.6 (01 Oct 2024 18:00)  HR: 106 (01 Oct 2024 20:00) (70 - 119)  BP: 127/75 (01 Oct 2024 19:00) (118/74 - 153/92)  BP(mean): 93 (01 Oct 2024 19:00) (90 - 113)  ABP: 173/88 (01 Oct 2024 20:00) (128/69 - 173/88)  ABP(mean): 118 (01 Oct 2024 20:00) (87 - 118)  RR: 17 (01 Oct 2024 19:00) (12 - 19)  SpO2: 100% (01 Oct 2024 20:00) (98% - 100%)      09-30-24 @ 07:01  -  10-01-24 @ 07:00  --------------------------------------------------------  IN: 3318.6 mL / OUT: 2470 mL / NET: 848.6 mL    10-01-24 @ 07:01  -  10-01-24 @ 21:00  --------------------------------------------------------  IN: 1631 mL / OUT: 1540 mL / NET: 91 mL      Mode: AC/ CMV (Assist Control/ Continuous Mandatory Ventilation), RR (machine): 12, TV (machine): 450, FiO2: 40, PEEP: 5, ITime: 1, MAP: 8, PIP: 13      EXAMINATION:  General:  HEENT:  MMM, ETT  Neuro:     Cards: S1/S2, RRR  Respiratory: Clear, no wheeze  Abdomen: Soft, non- tender  Extremities: No edema  Skin: Warm/dry      MEDICATIONS:   acetaminophen   Oral Liquid .. 650 milliGRAM(s) Oral every 6 hours PRN  amLODIPine   Tablet 10 milliGRAM(s) Oral daily  chlorhexidine 0.12% Liquid 15 milliLiter(s) Oral Mucosa every 12 hours  chlorhexidine 2% Cloths 1 Application(s) Topical <User Schedule>  dexMEDEtomidine Infusion 0.2 MICROgram(s)/kG/Hr (4 mL/Hr) IV Continuous <Continuous>  fentaNYL    Injectable 25 MICROGram(s) IV Push every 2 hours PRN  insulin lispro (ADMELOG) corrective regimen sliding scale   SubCutaneous every 6 hours  labetalol 100 milliGRAM(s) Enteral Tube every 8 hours  lactated ringers. 1000 milliLiter(s) (50 mL/Hr) IV Continuous <Continuous>  niCARdipine Infusion 5 mG/Hr (25 mL/Hr) IV Continuous <Continuous>  pantoprazole  Injectable 40 milliGRAM(s) IV Push daily  polyethylene glycol 3350 17 Gram(s) Oral daily  senna 2 Tablet(s) Oral at bedtime  sodium chloride 3% + sodium acetate 50:50 1000 milliLiter(s) (30 mL/Hr) IV Continuous <Continuous>    LABS:                      11.9   8.07  )-----------( 206      ( 01 Oct 2024 05:30 )             37.9     10-01    149[H]  |  122[H]  |  36[H]  ----------------------------<  117[H]  3.9   |  18[L]  |  3.59[H]    Ca    7.8[L]      01 Oct 2024 19:11  Phos  3.8     10-01  Mg     2.3     10-01    TPro  6.7  /  Alb  3.6  /  TBili  0.5  /  DBili  x   /  AST  14  /  ALT  13  /  AlkPhos  143[H]  09-30    LIVER FUNCTIONS - ( 30 Sep 2024 11:25 )  Alb: 3.6 g/dL / Pro: 6.7 g/dL / ALK PHOS: 143 U/L / ALT: 13 U/L / AST: 14 U/L / GGT: x           ABG - ( 01 Oct 2024 06:00 )  pH, Arterial: 7.33  pH, Blood: x     /  pCO2: 32    /  pO2: 159   / HCO3: 17    / Base Excess: -7.9  /  SaO2: 98.9          IMAGING:   CT HEAD: 9/30 09:19  FINDINGS:    Large pontine hemorrhage with subarachnoid extension. Edema within the nabil causing effacement of the fourth ventricle. No hydrocephalus as of   yet. Chronic small vessel ischemic changes in the white matter with old infarcts in the left external capsule, left caudate nucleus, the right   lentiform nucleus. No extra-axial collection or midline shift. No CT evidence of an acute ischemic stroke.      CT head 10/1 15:50  IMPRESSION:  1. Large pontine hemorrhage with subarachnoid extension. Edema causes effacement of the cerebral aqueduct and fourth ventricle without hydrocephalus at this time.    2. Multiple old infarcts as above on a background of chronic small vessel ischemic changes.    As compared to prior CT head same day at 9:15 AM, marginally enlarged pontine hemorrhage with associated edema and mass effect upon the fourth   ventricle and cerebral aqueduct.  Mild posterior frontal lobe subarachnoid hemorrhage. Mild posterior left frontal lobe subarachnoid hemorrhage.        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 CPAP as tolerated  ABG, CXR  VAP bundle    CV:  SBP goal 100-160  Wean cardene; on amlodipine and labetalol  Baseline EKG, TTE     RENAL: CKD  Fluids: 3% at   LR at   Vuong catheter in place; monitor Is/Os    Renal US  Na goal 145-150    GI:  Diet:  GI prophylaxis [] not indicated [x] PPI [] other:  Bowel regimen [] colace [x] senna [] other: miralax  LBM:    ENDO:   Goal euglycemia (-180)    HEME/ONC:  VTE prophylaxis: [x] SCDs [x] hold chemoprophylaxis due to: acute heme    ID:   Mild leukocytosis  Afebrile      Continue care per daytime intensivist Mario Alberto HOUSTON    Critical care time: 45 minutes         [facility] -- ADDITIONAL PROGRESS NOTE    Nighttime rounds were performed       Patient is a 43 y/o male with unknown past medical history (reported stroke and MI by coworkers). He presented to [facility] with AMS. Prior to this, pt was working at [employer] when he was found down by coworkers. EMS called and pt brought to [facility] ED. Intubated, sedated, started on cardene for SBPs in 200s. CT head showed pontine bleed. Transferred to NSICU for further management.  (30 Sep 2024 12:55)      On admission, the patient was:  GCS:   Cash-Shnae:  modified Robles:  ICH score: 3  NIHSS:    *** HIGH RISK OF DVT PRESENT ON ADMISSION ***      ICU Vital Signs Last 24 Hrs  T(C): 37.6 (01 Oct 2024 20:00), Max: 39.8 (01 Oct 2024 18:00)  T(F): 99.7 (01 Oct 2024 20:00), Max: 103.6 (01 Oct 2024 18:00)  HR: 106 (01 Oct 2024 20:00) (70 - 119)  BP: 127/75 (01 Oct 2024 19:00) (118/74 - 153/92)  BP(mean): 93 (01 Oct 2024 19:00) (90 - 113)  ABP: 173/88 (01 Oct 2024 20:00) (128/69 - 173/88)  ABP(mean): 118 (01 Oct 2024 20:00) (87 - 118)  RR: 17 (01 Oct 2024 19:00) (12 - 19)  SpO2: 100% (01 Oct 2024 20:00) (98% - 100%)      09-30-24 @ 07:01  -  10-01-24 @ 07:00  --------------------------------------------------------  IN: 3318.6 mL / OUT: 2470 mL / NET: 848.6 mL    10-01-24 @ 07:01  -  10-01-24 @ 21:00  --------------------------------------------------------  IN: 1631 mL / OUT: 1540 mL / NET: 91 mL      Mode: AC/ CMV (Assist Control/ Continuous Mandatory Ventilation), RR (machine): 12, TV (machine): 450, FiO2: 40, PEEP: 5, ITime: 1, MAP: 8, PIP: 13      EXAMINATION:  General: Not on sedation  HEENT:  MMM, ETT  Neuro: Patient not on sedation, pupils 3mm and reactive, cough/gag, corneals reflexes present   RUE trace withdraws, LUE extends  RLE withdraws, LLE brisk TF  ? following commands to look down at his toes when asked x 2.  Cards: S1/S2, RRR  Respiratory: Clear, no wheeze  Abdomen: Soft, non- tender  Extremities: No edema  Skin: Warm/dry      MEDICATIONS:   acetaminophen   Oral Liquid .. 650 milliGRAM(s) Oral every 6 hours PRN  amLODIPine   Tablet 10 milliGRAM(s) Oral daily  chlorhexidine 0.12% Liquid 15 milliLiter(s) Oral Mucosa every 12 hours  chlorhexidine 2% Cloths 1 Application(s) Topical <User Schedule>  dexMEDEtomidine Infusion 0.2 MICROgram(s)/kG/Hr (4 mL/Hr) IV Continuous <Continuous>  fentaNYL    Injectable 25 MICROGram(s) IV Push every 2 hours PRN  insulin lispro (ADMELOG) corrective regimen sliding scale   SubCutaneous every 6 hours  labetalol 100 milliGRAM(s) Enteral Tube every 8 hours  lactated ringers. 1000 milliLiter(s) (50 mL/Hr) IV Continuous <Continuous>  niCARdipine Infusion 5 mG/Hr (25 mL/Hr) IV Continuous <Continuous>  pantoprazole  Injectable 40 milliGRAM(s) IV Push daily  polyethylene glycol 3350 17 Gram(s) Oral daily  senna 2 Tablet(s) Oral at bedtime  sodium chloride 3% + sodium acetate 50:50 1000 milliLiter(s) (30 mL/Hr) IV Continuous <Continuous>    LABS:                      11.9   8.07  )-----------( 206      ( 01 Oct 2024 05:30 )             37.9     10-01    149[H]  |  122[H]  |  36[H]  ----------------------------<  117[H]  3.9   |  18[L]  |  3.59[H]    Ca    7.8[L]      01 Oct 2024 19:11  Phos  3.8     10-01  Mg     2.3     10-01    TPro  6.7  /  Alb  3.6  /  TBili  0.5  /  DBili  x   /  AST  14  /  ALT  13  /  AlkPhos  143[H]  09-30    LIVER FUNCTIONS - ( 30 Sep 2024 11:25 )  Alb: 3.6 g/dL / Pro: 6.7 g/dL / ALK PHOS: 143 U/L / ALT: 13 U/L / AST: 14 U/L / GGT: x           ABG - ( 01 Oct 2024 06:00 )  pH, Arterial: 7.33  pH, Blood: x     /  pCO2: 32    /  pO2: 159   / HCO3: 17    / Base Excess: -7.9  /  SaO2: 98.9          IMAGING:   CT HEAD: 9/30 09:19  FINDINGS:    Large pontine hemorrhage with subarachnoid extension. Edema within the nabil causing effacement of the fourth ventricle. No hydrocephalus as of   yet. Chronic small vessel ischemic changes in the white matter with old infarcts in the left external capsule, left caudate nucleus, the right   lentiform nucleus. No extra-axial collection or midline shift. No CT evidence of an acute ischemic stroke.      CT head 10/1 15:50  IMPRESSION:  1. Large pontine hemorrhage with subarachnoid extension. Edema causes effacement of the cerebral aqueduct and fourth ventricle without hydrocephalus at this time.    2. Multiple old infarcts as above on a background of chronic small vessel ischemic changes.    As compared to prior CT head same day at 9:15 AM, marginally enlarged pontine hemorrhage with associated edema and mass effect upon the fourth   ventricle and cerebral aqueduct.  Mild posterior frontal lobe subarachnoid hemorrhage. Mild posterior left frontal lobe subarachnoid hemorrhage.        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 CPAP as tolerated  ABG, CXR  VAP bundle    CV:  SBP goal 100-160  Wean cardene; on amlodipine and labetalol  Baseline EKG, TTE     RENAL: CKD  Fluids: 3% at 30cc/hr  LR at 50cc/hr  Vuong catheter in place; monitor Is/Os  Renal US  Na goal 145-150    GI:  Diet: TFs as tolerated  GI prophylaxis [] not indicated [x] PPI [] other:  Bowel regimen [] colace [x] senna [] other: miralax  LBM: Awaiting    ENDO:   Goal euglycemia (-180)    HEME/ONC:  VTE prophylaxis: [x] SCDs [x] hold chemoprophylaxis due to: acute heme    ID:   Mild leukocytosis  Afebrile      Continue care per daytime intensivist Mario Alberto HOUSTON    Critical care time: 45 minutes

## 2024-10-01 NOTE — CONSULT NOTE ADULT - ASSESSMENT
Unknown age male, unknown past medical history (reported stroke and MI by coworkers) presented to [facility] with AMS, Pt was working at [employer] when he was found down by coworkers. EMS called and pt brought to [facility] ED. Intubated, sedated, started on cardene for SBPs in 200s. CT head showed brain stem bleed. Transferred to NSICU for further management.     Impression: pontine hemorrhage likely related to uncontrolled HTN    Neuro  #CVA workup  - Hold anticoagulation/antiplatelet/ chemical dvt prophylaxis  -  neuro checks and vitals per unit protocol  - can consider MRI Brain with and without contrast when medically stable  - HCT Results: above  - CTA Results: above  - Stroke education    Cards  #HTN  - SBP< 130  - hold home blood pressure medication for now      #HLD  - can check lipid panel    Pulm  - call provider if SPO2 < 94%    GI  #Nutrition/Fluids/Electrolytes   - replete K<4 and Mg <2  - Diet: NGT   - IVF: per primary team    Renal  - ANIKA   - management per primary team        Endocrine  #DM  - A1C results: 5.3  - ISS    - TSH results: 1.23    DVT Prophylaxis  - SCDs for DVT prophylaxis     Pt discussed during rounds with Dr. Serafin NINAR Goals: Goals reviewed at interdisciplinary rounds with case management, social work, physical therapy, occupational therapy, and speech language pathology.   Please see specific therapy  notes for in depth goals.  Dispo: **********(Acute rehab - can tolerate 3 hours of therapy)     Discussed daily hospital plans and goals with patient and family at bedside. (Called and updated family.) Unknown age male, unknown past medical history (reported stroke and MI by coworkers) presented to [facility] with AMS, Pt was working at [employer] when he was found down by coworkers. EMS called and pt brought to [facility] ED. Intubated, sedated, started on cardene for SBPs in 200s. CT head showed brain stem bleed. Transferred to NSICU for further management.     Impression: pontine hemorrhage likely related to uncontrolled HTN    Neuro  #CVA workup  - Hold anticoagulation/antiplatelet/ chemical dvt prophylaxis  -  neuro checks and vitals per unit protocol  - can consider MRI Brain with and without contrast when medically stable  - HCT Results: above  - CTA Results: above  - Stroke education  - Palliative consult    Cards  #HTN  - SBP< 130  - hold home blood pressure medication for now      #HLD  - can check lipid panel    Pulm  - call provider if SPO2 < 94%    GI  #Nutrition/Fluids/Electrolytes   - replete K<4 and Mg <2  - Diet: NGT   - IVF: per primary team    Renal  - ANIKA   - management per primary team        Endocrine  #DM  - A1C results: 5.3  - ISS    - TSH results: 1.23    DVT Prophylaxis  - SCDs for DVT prophylaxis     Pt discussed during rounds with Dr. Serafin NINAR Goals: Goals reviewed at interdisciplinary rounds with case management, social work, physical therapy, occupational therapy, and speech language pathology.   Please see specific therapy  notes for in depth goals.  Dispo: **********(Acute rehab - can tolerate 3 hours of therapy)     Discussed daily hospital plans and goals with patient and family at bedside. (Called and updated family.) Unknown age male, unknown past medical history (reported stroke and MI by coworkers) presented to [facility] with AMS, Pt was working at [employer] when he was found down by coworkers. EMS called and pt brought to [facility] ED. Intubated, sedated, started on cardene for SBPs in 200s. CT head showed brain stem bleed. Transferred to NSICU for further management.     Impression: pontine hemorrhage likely related to uncontrolled HTN    Neuro  #CVA workup  - Hold anticoagulation/antiplatelet/ chemical dvt prophylaxis  -  neuro checks and vitals per unit protocol  - repeat CTH  to monitor edema  - MRI Brain with and without contrast when medically stable  - HCT Results: above  - CTA Results: above  - Stroke education  - Palliative consult    Cards  #HTN  - SBP< 130  - hold home blood pressure medication for now      #HLD  - can check lipid panel    Pulm  - call provider if SPO2 < 94%    GI  #Nutrition/Fluids/Electrolytes   - replete K<4 and Mg <2  - Diet: NGT   - IVF: per primary team    Renal  - ANIKA   - management per primary team        Endocrine  #DM  - A1C results: 5.3  - ISS    - TSH results: 1.23    DVT Prophylaxis  - SCDs for DVT prophylaxis     Pt discussed during rounds with Dr. Betancourt    IDR Goals: Goals reviewed at interdisciplinary rounds with case management, social work, physical therapy, occupational therapy, and speech language pathology.   Please see specific therapy  notes for in depth goals.  Dispo: pending    Patient seen and plans discussed with Dr. Betancourt Unknown age male, unknown past medical history (reported stroke and MI by coworkers) presented to [facility] with AMS, Pt was working at [employer] when he was found down by coworkers. EMS called and pt brought to [facility] ED. Intubated, sedated, started on cardene for SBPs in 200s. CT head showed brain stem bleed. Transferred to NSICU for further management.     Impression: pontine hemorrhage likely related to uncontrolled HTN    Neuro  #CVA workup  - Hold anticoagulation/antiplatelet/ chemical dvt prophylaxis  -  neuro checks and vitals per unit protocol  - repeat CTH  in 24 hours to monitor edema  - MRI Brain with and without contrast when medically stable  - HCT Results: above  - CTA Results: above  - Stroke education  - Palliative consult    Cards  #HTN  - SBP< 130  - hold home blood pressure medication for now      #HLD  - can check lipid panel    Pulm  - call provider if SPO2 < 94%    GI  #Nutrition/Fluids/Electrolytes   - replete K<4 and Mg <2  - Diet: NGT   - IVF: per primary team    Renal  - ANIKA   - management per primary team        Endocrine  #DM  - A1C results: 5.3  - ISS    - TSH results: 1.23    DVT Prophylaxis  - SCDs for DVT prophylaxis     Pt discussed during rounds with Dr. Betancourt    IDR Goals: Goals reviewed at interdisciplinary rounds with case management, social work, physical therapy, occupational therapy, and speech language pathology.   Please see specific therapy  notes for in depth goals.  Dispo: pending    Patient seen and plans discussed with Dr. Betancourt

## 2024-10-01 NOTE — CONSULT NOTE ADULT - SUBJECTIVE AND OBJECTIVE BOX
HPI:  Unknown age male, unknown past medical history (reported stroke and MI by coworkers) presented to [facility] with AMS, Pt was working at [employer] when he was found down by coworkers. EMS called and pt brought to [facility] ED. Intubated, sedated, started on cardene for SBPs in 200s. CT head showed brain stem bleed. Transferred to NSICU for further management.  (30 Sep 2024 12:55)    PERTINENT PM/SXH:   Acute myocardial infarction    CVA (cerebral vascular accident)        FAMILY HISTORY:  No history of  in first degree relatives according to chart  Unable to obtain due to patient's encephalopathy    ITEMS NOT CHECKED ARE NOT PRESENT  SOCIAL HISTORY:   Significant other/partner:  []  Children:  []   Substance hx:  []   Tobacco hx:  []   Alcohol hx: []   Home Opioid hx:  [] I-Stop Reference No:  - no active Rx's / see chart note  Living Situation: []Home  []Long term care  []Rehab []Other  Mandaen/Spiritual practice: ; Role of organized Restorationism [ ] important [ ] some [ ] unable to assess  Coping: [] well [] with difficulty [] poor coping [] unable to assess  Support system: [] strong [] adequate [] inadequate    ADVANCE DIRECTIVES:    []MOLST  []Living Will  DECISION MAKER(s):  [] Health Care Proxy(s)  [] Surrogate(s)  [] Guardian           Name(s)/Phone Number(s):     BASELINE (I)ADLs (prior to admission):  [place]: []Total  [] Moderate []Dependent    ALLERGIES:  Allergy Status Unknown    MEDICATIONS  (STANDING):  amLODIPine   Tablet 10 milliGRAM(s) Oral daily  chlorhexidine 0.12% Liquid 15 milliLiter(s) Oral Mucosa every 12 hours  chlorhexidine 2% Cloths 1 Application(s) Topical <User Schedule>  dexMEDEtomidine Infusion 0.2 MICROgram(s)/kG/Hr (4 mL/Hr) IV Continuous <Continuous>  insulin lispro (ADMELOG) corrective regimen sliding scale   SubCutaneous every 6 hours  labetalol 100 milliGRAM(s) Enteral Tube every 8 hours  lactated ringers. 1000 milliLiter(s) (80 mL/Hr) IV Continuous <Continuous>  niCARdipine Infusion 5 mG/Hr (25 mL/Hr) IV Continuous <Continuous>  pantoprazole  Injectable 40 milliGRAM(s) IV Push daily  polyethylene glycol 3350 17 Gram(s) Oral daily  senna 2 Tablet(s) Oral at bedtime  sodium chloride 3% + sodium acetate 50:50 1000 milliLiter(s) (30 mL/Hr) IV Continuous <Continuous>    MEDICATIONS  (PRN):  acetaminophen   Oral Liquid .. 650 milliGRAM(s) Oral every 6 hours PRN Temp greater or equal to 38C (100.4F), Mild Pain (1 - 3)  fentaNYL    Injectable 25 MICROGram(s) IV Push every 2 hours PRN agitation, severe pain, vent desynchrony    Analgesic Use (Scheduled and PRNs) for past 24 hours:    dexMEDEtomidine Infusion   4 mL/Hr IV Continuous (09-30-24 @ 22:37)    dexMEDEtomidine Infusion   4 mL/Hr IV Continuous (09-30-24 @ 20:22)    propofol Infusion   2.4 mL/Hr IV Continuous (09-30-24 @ 11:43)      PRESENT SYMPTOMS/REVIEW OF SYSTEMS: []Unable to obtain due to poor mentation/encephalopathy  Source if other than patient:  []Family   []Team     Pain: [] yes [] no  QOL Impact -   Location -                    Aggravating Factors -  Quality -  Radiation -  Timing -  Severity (0-10 scale) -   Minimal Acceptable Level (0-10 scale) -    CPOT Score:  (Pain Assessment Scale for Critically Ill)    PAIN AD Score:  (Nonverbal Pain Assessment Scale)    Dyspnea:                           []Mild  []Moderate []Severe  Anxiety:                             []Mild []Moderate []Severe  Fatigue:                             []Mild []Moderate []Severe  Nausea:                             []Mild []Moderate []Severe  Loss of Appetite:              []Mild []Moderate []Severe  Constipation:                    []Mild []Moderate []Severe    Other Symptoms:  [x]All Other Review Of Systems Negative     Palliative Performance Status Version 2:  %  (Functional Assessment Tool)    PHYSICAL EXAM:  GENERAL:  []Alert  []Oriented x   []Lethargic  []Cachexia  []Unarousable  []Verbal  []Non-Verbal  Behavioral:   []Anxiety  []Delirium []Agitation []Cooperative  HEENT:  []Normal   []Dry mouth   []ET Tube/Trach  []Oral lesions  PULMONARY:   []Clear []Tachypnea  []Audible excessive secretions  []Normal Work of Breathing []Labored Breathing  []Rhonchi []Crackles []Wheezing  CARDIOVASCULAR:    []Regular Rate & Rhythm []Irregular []Tachy  []Alexi  GASTROINTESTINAL:  []Soft  []Distended   []+BS  []Non tender []Tender  []PEG []OGT/ NGT  Last BM:  GENITOURINARY:  []Normal [] Incontinent   []Oliguria/Anuria   []Vuong  MUSCULOSKELETAL:   []Normal   []Weakness  []Bed/Wheelchair bound []Edema  NEUROLOGIC:   []No focal deficits  []Cognitive impairment  []Dysphagia []Dysarthria []Paresis []Encephalopathic  SKIN:   []Normal   []Pressure ulcer(s)  []Rash    CRITICAL CARE:  []Shock Present  []Septic []Cardiogenic []Neurologic []Hypovolemic  []Vasopressors []Inotropes   []Respiratory failure present []Mechanical Ventilation []Non-invasive ventilatory support []High-Flow  []Acute  []Chronic []Hypoxic  []Hypercarbic  []Other organ failure    Vital Signs Last 24 Hrs  T(C): 37.3 (01 Oct 2024 09:10), Max: 37.6 (01 Oct 2024 01:03)  T(F): 99.2 (01 Oct 2024 09:10), Max: 99.6 (01 Oct 2024 01:03)  HR: 85 (01 Oct 2024 11:00) (70 - 105)  BP: 145/90 (01 Oct 2024 11:00) (115/58 - 170/103)  BP(mean): 112 (01 Oct 2024 11:00) (80 - 131)  RR: 16 (01 Oct 2024 11:00) (12 - 21)  SpO2: 99% (01 Oct 2024 11:00) (97% - 100%)    Parameters below as of 01 Oct 2024 12:00  Patient On (Oxygen Delivery Method): ventilator    O2 Concentration (%): 40    LABS:                        11.9   8.07  )-----------( 206      ( 01 Oct 2024 05:30 )             37.9   10-01    150[H]  |  123[H]  |  38[H]  ----------------------------<  103[H]  4.6   |  18[L]  |  3.38[H]    Ca    7.8[L]      01 Oct 2024 09:23  Phos  3.8     10-01  Mg     2.3     10-01    TPro  6.7  /  Alb  3.6  /  TBili  0.5  /  DBili  x   /  AST  14  /  ALT  13  /  AlkPhos  143[H]  09-30    RADIOLOGY & ADDITIONAL STUDIES:      REFERRALS:  [x]Social Work  []Case management []PT/OT []Chaplaincy  []Hospice  []Patient/Family Support []Massage Therapy []Music Therapy    DISCUSSION OF CASE: Family - obtained additional history and to provide emotional support;  Primary team - discussed plan of care HPI:  Unknown age male, unknown past medical history (reported stroke and MI by coworkers) presented to [facility] with AMS, Pt was working at [email or web address] when he was found down by coworkers. EMS called and pt brought to [facility] ED. Intubated, sedated, started on cardene for SBPs in 200s. CT head showed brain stem bleed. Transferred to NSICU for further management.  (30 Sep 2024 12:55) Palliative is consulted to assist with GOC conversation given guarded prognosis.     Interval Events: Repeat CT head 6 hours since admission showed enlarged pontine hemorrhage with edema, mass effect and subarachnoid hemorrhage, repeat CT head 6 hours later was stable. As per discussion with primary team, the prognosis is poor. Life expectancy is difficult to predict at this time, but the likely expected course is to remain ventilator-dependent. As per collateral, from primary team patient is actually 44 y.o (not in his 70s as initially documented). As per chart review (the Ethics team note) patient  has a  living father (in [place]), sister (in [place]), son (in [place]), and daughter (in [place]).  On evaluation by palliative care team, roommate was present at bedside. He stated that the patient has family in [place] and an uncle in [place] and he maintains regular contact with them by phone. He has  never had family visit him at home because they all live in [place] except the uncle. In addition, he has not visited [place] in 15-20 years. As per the roommate, he had found many Tylenol wrappers in his room and he think he may have had  a headache on the days leading up to this. Otherwise, he was in his usual state of health, prior to this hospitalization. 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 He stated that the patient did have some prior medical problems ?strokes (but never this serious) and was supposed to take medications and visit doctors regularly, but he was not able to do that due to working long hours.     PERTINENT PM/SXH:   Acute myocardial infarction    CVA (cerebral vascular accident)        FAMILY HISTORY:  No history of  in first degree relatives according to chart  Unable to obtain due to patient's encephalopathy    ITEMS NOT CHECKED ARE NOT PRESENT  SOCIAL HISTORY:   Significant other/partner:  [No]  Children:  [ son and daughter]   Substance hx: Unknown   Home Opioid hx:  Unknown  - no active Rx's / see chart note  Living Situation: Home with roommate   Confucianism/Spiritual practice: ; Unknown  Coping:  unable to assess  Support system: [employer]    ADVANCE DIRECTIVES:    Unknown   DECISION MAKER(s):  Pending discussion with Ethics. As per chart review, patient has a living father, sister, son and daughter (unknown age)          Name(s)/Phone Number(s):     Information at bedside: Sister: Carisa Oh +52  and son Phil +52     BASELINE (I)ADLs (prior to admission):  [place]: Total     ALLERGIES:  Allergy Status Unknown    MEDICATIONS  (STANDING):  amLODIPine   Tablet 10 milliGRAM(s) Oral daily  chlorhexidine 0.12% Liquid 15 milliLiter(s) Oral Mucosa every 12 hours  chlorhexidine 2% Cloths 1 Application(s) Topical <User Schedule>  dexMEDEtomidine Infusion 0.2 MICROgram(s)/kG/Hr (4 mL/Hr) IV Continuous <Continuous>  insulin lispro (ADMELOG) corrective regimen sliding scale   SubCutaneous every 6 hours  labetalol 100 milliGRAM(s) Enteral Tube every 8 hours  lactated ringers. 1000 milliLiter(s) (80 mL/Hr) IV Continuous <Continuous>  niCARdipine Infusion 5 mG/Hr (25 mL/Hr) IV Continuous <Continuous>  pantoprazole  Injectable 40 milliGRAM(s) IV Push daily  polyethylene glycol 3350 17 Gram(s) Oral daily  senna 2 Tablet(s) Oral at bedtime  sodium chloride 3% + sodium acetate 50:50 1000 milliLiter(s) (30 mL/Hr) IV Continuous <Continuous>    MEDICATIONS  (PRN):  acetaminophen   Oral Liquid .. 650 milliGRAM(s) Oral every 6 hours PRN Temp greater or equal to 38C (100.4F), Mild Pain (1 - 3)  fentaNYL    Injectable 25 MICROGram(s) IV Push every 2 hours PRN agitation, severe pain, vent desynchrony    Analgesic Use (Scheduled and PRNs) for past 24 hours:    dexMEDEtomidine Infusion   4 mL/Hr IV Continuous (09-30-24 @ 22:37)    dexMEDEtomidine Infusion   4 mL/Hr IV Continuous (09-30-24 @ 20:22)    propofol Infusion   2.4 mL/Hr IV Continuous (09-30-24 @ 11:43)      PRESENT SYMPTOMS/REVIEW OF SYSTEMS: []Unable to obtain due to poor mentation/encephalopathy  Source if other than patient:  []Family   []Team     Pain: Unable to assess due to Encephalopathy       CPOT Score:  (Pain Assessment Scale for Critically Ill)    PAIN AD Score:  (Nonverbal Pain Assessment Scale)    Dyspnea:                           Currently on Ventilator   Anxiety:                             Unable to assess   Fatigue:                            Unable to assess   Nausea:                            Unable to assess   Loss of Appetite:              Unable to assess   Constipation:                   On bowel regimen: senna and miralax     Other Symptoms:  [x]All Other Review Of Systems Negative     Palliative Performance Status Version 2:  %  (Functional Assessment Tool)    PHYSICAL EXAM:  GENERAL:  []Alert  []Oriented    []Lethargic  []Cachexia  [x]Unarousable  []Verbal  []Non-Verbal  Behavioral:   []Anxiety  []Delirium []Agitation []Cooperative   HEENT:  []Normal   []Dry mouth   [x]ET Tube/Trach  []Oral lesions  PULMONARY:   [x]Clear []Tachypnea  []Audible excessive secretions  [x]Normal Work of Breathing []Labored Breathing  []Rhonchi []Crackles []Wheezing  CARDIOVASCULAR:    [x]Regular Rate & Rhythm []Irregular []Tachy  []Alexi  GASTROINTESTINAL:  [x]Soft  []Distended   [x]+BS  [x]Non tender []Tender  []PEG []OGT/ NGT  Last BM:  GENITOURINARY:  []Normal [] Incontinent   []Oliguria/Anuria   [x]Vuong  MUSCULOSKELETAL:   [x]Normal   []Weakness  []Bed/Wheelchair bound [x]NO Edema  NEUROLOGIC:   []No focal deficits  []Cognitive impairment  []Dysphagia []Dysarthria []Paresis [x]Encephalopathic, does not follow command, unarousable   SKIN:   [x]Normal   []Pressure ulcer(s)  []Rash    CRITICAL CARE:  []Shock Present  []Septic []Cardiogenic []Neurologic []Hypovolemic  []Vasopressors []Inotropes   [x]Respiratory failure present [x]Mechanical Ventilation []Non-invasive ventilatory support []High-Flow  []Acute  []Chronic []Hypoxic  []Hypercarbic [ x] Airway protection, i/s/o LOC   []Other organ failure    Vital Signs Last 24 Hrs  T(C): 37.3 (01 Oct 2024 09:10), Max: 37.6 (01 Oct 2024 01:03)  T(F): 99.2 (01 Oct 2024 09:10), Max: 99.6 (01 Oct 2024 01:03)  HR: 85 (01 Oct 2024 11:00) (70 - 105)  BP: 145/90 (01 Oct 2024 11:00) (115/58 - 170/103)  BP(mean): 112 (01 Oct 2024 11:00) (80 - 131)  RR: 16 (01 Oct 2024 11:00) (12 - 21)  SpO2: 99% (01 Oct 2024 11:00) (97% - 100%)    Parameters below as of 01 Oct 2024 12:00  Patient On (Oxygen Delivery Method): ventilator    O2 Concentration (%): 40    LABS:                        11.9   8.07  )-----------( 206      ( 01 Oct 2024 05:30 )             37.9   10-01    150[H]  |  123[H]  |  38[H]  ----------------------------<  103[H]  4.6   |  18[L]  |  3.38[H]    Ca    7.8[L]      01 Oct 2024 09:23  Phos  3.8     10-01  Mg     2.3     10-01    TPro  6.7  /  Alb  3.6  /  TBili  0.5  /  DBili  x   /  AST  14  /  ALT  13  /  AlkPhos  143[H]  09-30    RADIOLOGY & ADDITIONAL STUDIES:      REFERRALS:  [x]Social Work  []Case management []PT/OT []Chaplaincy  []Hospice  []Patient/Family Support []Massage Therapy []Music Therapy    DISCUSSION OF CASE: Family - Pending further discussion with ethics team and primary team regarding surrogate and additional family member identification;  Primary team - discussed plan of care HPI:  44 y.o male, unknown past medical history (reported stroke and MI by coworkers) presented to [facility] with AMS, Pt was working at [employer] when he was found down by coworkers. EMS called and pt brought to [facility] ED. Intubated, sedated, started on cardene for SBPs in 200s. CT head showed brain stem bleed. Transferred to NSICU for further management.  (30 Sep 2024 12:55) Palliative team is consulted to assist with GOC conversation given guarded prognosis.     Interval Events: Repeat CT head 6 hours since admission showed enlarged pontine hemorrhage with edema, mass effect and subarachnoid hemorrhage, repeat CT head 6 hours later was stable. As per discussion with primary team, the prognosis is poor. Life expectancy is difficult to predict at this time, but the likely expected course is to remain ventilator-dependent. As per collateral, from primary team patient is actually 44 y.o (not in his 70s as initially documented). As per chart review (the Ethics team note) patient  has a  living father (in [place]), sister (in [place]), son (in [place]), and daughter (in [place]).  On evaluation by palliative care team, roommate was present at bedside. He stated that the patient has family in [place] and an uncle in [place] and he maintains regular contact with them by phone. He has  never had family visit him at home because they all live in [place] except the uncle. In addition, he has not visited [place] in 15-20 years. As per the roommate, he had found many Tylenol wrappers in his room and he think he may have had  a headache on the days leading up to this. Otherwise, he was in his usual state of health, prior to this hospitalization. 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 He stated that the patient did have some prior medical problems ?strokes (but never this serious) and was supposed to take medications and visit doctors regularly, but he was not able to do that due to working long hours.     PERTINENT PM/SXH:   Acute myocardial infarction    CVA (cerebral vascular accident)        FAMILY HISTORY:  No history of  in first degree relatives according to chart  Unable to obtain due to patient's encephalopathy    ITEMS NOT CHECKED ARE NOT PRESENT  SOCIAL HISTORY:   Significant other/partner:  [No]  Children:  [ son and daughter]   Substance hx: Unknown   Home Opioid hx:  Unknown  - no active Rx's / see chart note  Living Situation: Home with roommate   Congregation/Spiritual practice: ; Unknown  Coping:  unable to assess  Support system: [employer]    ADVANCE DIRECTIVES:    Unknown   DECISION MAKER(s):  Pending discussion with Ethics. As per chart review, patient has a living father, sister, son and daughter (unknown age)          Name(s)/Phone Number(s):     Information at bedside: Sister: Carisa Oh +52  and son Phil +52     BASELINE (I)ADLs (prior to admission):  [place]: Total     ALLERGIES:  Allergy Status Unknown    MEDICATIONS  (STANDING):  amLODIPine   Tablet 10 milliGRAM(s) Oral daily  chlorhexidine 0.12% Liquid 15 milliLiter(s) Oral Mucosa every 12 hours  chlorhexidine 2% Cloths 1 Application(s) Topical <User Schedule>  dexMEDEtomidine Infusion 0.2 MICROgram(s)/kG/Hr (4 mL/Hr) IV Continuous <Continuous>  insulin lispro (ADMELOG) corrective regimen sliding scale   SubCutaneous every 6 hours  labetalol 100 milliGRAM(s) Enteral Tube every 8 hours  lactated ringers. 1000 milliLiter(s) (80 mL/Hr) IV Continuous <Continuous>  niCARdipine Infusion 5 mG/Hr (25 mL/Hr) IV Continuous <Continuous>  pantoprazole  Injectable 40 milliGRAM(s) IV Push daily  polyethylene glycol 3350 17 Gram(s) Oral daily  senna 2 Tablet(s) Oral at bedtime  sodium chloride 3% + sodium acetate 50:50 1000 milliLiter(s) (30 mL/Hr) IV Continuous <Continuous>    MEDICATIONS  (PRN):  acetaminophen   Oral Liquid .. 650 milliGRAM(s) Oral every 6 hours PRN Temp greater or equal to 38C (100.4F), Mild Pain (1 - 3)  fentaNYL    Injectable 25 MICROGram(s) IV Push every 2 hours PRN agitation, severe pain, vent desynchrony    Analgesic Use (Scheduled and PRNs) for past 24 hours:    dexMEDEtomidine Infusion   4 mL/Hr IV Continuous (09-30-24 @ 22:37)    dexMEDEtomidine Infusion   4 mL/Hr IV Continuous (09-30-24 @ 20:22)    propofol Infusion   2.4 mL/Hr IV Continuous (09-30-24 @ 11:43)      PRESENT SYMPTOMS/REVIEW OF SYSTEMS: []Unable to obtain due to poor mentation/encephalopathy  Source if other than patient:  []Family   []Team     Pain: Unable to assess due to Encephalopathy       CPOT Score:  (Pain Assessment Scale for Critically Ill)    PAIN AD Score:  (Nonverbal Pain Assessment Scale)    Dyspnea:                           Currently on Ventilator   Anxiety:                             Unable to assess   Fatigue:                            Unable to assess   Nausea:                            Unable to assess   Loss of Appetite:              Unable to assess   Constipation:                   On bowel regimen: senna and miralax     Other Symptoms:  [x]All Other Review Of Systems Negative     Palliative Performance Status Version 2:  %  (Functional Assessment Tool)    PHYSICAL EXAM:  GENERAL:  []Alert  []Oriented    []Lethargic  []Cachexia  [x]Unarousable  []Verbal  []Non-Verbal  Behavioral:   []Anxiety  []Delirium []Agitation []Cooperative   HEENT:  []Normal   []Dry mouth   [x]ET Tube/Trach  []Oral lesions  PULMONARY:   [x]Clear []Tachypnea  []Audible excessive secretions  [x]Normal Work of Breathing []Labored Breathing  []Rhonchi []Crackles []Wheezing  CARDIOVASCULAR:    [x]Regular Rate & Rhythm []Irregular []Tachy  []Alexi  GASTROINTESTINAL:  [x]Soft  []Distended   [x]+BS  [x]Non tender []Tender  []PEG []OGT/ NGT  Last BM:  GENITOURINARY:  []Normal [] Incontinent   []Oliguria/Anuria   [x]Vuong  MUSCULOSKELETAL:   [x]Normal   []Weakness  []Bed/Wheelchair bound [x]NO Edema  NEUROLOGIC:   []No focal deficits  []Cognitive impairment  []Dysphagia []Dysarthria []Paresis [x]Encephalopathic, does not follow command, unarousable   SKIN:   [x]Normal   []Pressure ulcer(s)  []Rash    CRITICAL CARE:  []Shock Present  []Septic []Cardiogenic []Neurologic []Hypovolemic  []Vasopressors []Inotropes   [x]Respiratory failure present [x]Mechanical Ventilation []Non-invasive ventilatory support []High-Flow  []Acute  []Chronic []Hypoxic  []Hypercarbic [ x] Airway protection, i/s/o LOC   []Other organ failure    Vital Signs Last 24 Hrs  T(C): 37.3 (01 Oct 2024 09:10), Max: 37.6 (01 Oct 2024 01:03)  T(F): 99.2 (01 Oct 2024 09:10), Max: 99.6 (01 Oct 2024 01:03)  HR: 85 (01 Oct 2024 11:00) (70 - 105)  BP: 145/90 (01 Oct 2024 11:00) (115/58 - 170/103)  BP(mean): 112 (01 Oct 2024 11:00) (80 - 131)  RR: 16 (01 Oct 2024 11:00) (12 - 21)  SpO2: 99% (01 Oct 2024 11:00) (97% - 100%)    Parameters below as of 01 Oct 2024 12:00  Patient On (Oxygen Delivery Method): ventilator    O2 Concentration (%): 40    LABS:                        11.9   8.07  )-----------( 206      ( 01 Oct 2024 05:30 )             37.9   10-01    150[H]  |  123[H]  |  38[H]  ----------------------------<  103[H]  4.6   |  18[L]  |  3.38[H]    Ca    7.8[L]      01 Oct 2024 09:23  Phos  3.8     10-01  Mg     2.3     10-01    TPro  6.7  /  Alb  3.6  /  TBili  0.5  /  DBili  x   /  AST  14  /  ALT  13  /  AlkPhos  143[H]  09-30    RADIOLOGY & ADDITIONAL STUDIES:      REFERRALS:  [x]Social Work  []Case management []PT/OT []Chaplaincy  []Hospice  []Patient/Family Support []Massage Therapy []Music Therapy    DISCUSSION OF CASE: Family - Pending further discussion with ethics team and primary team regarding surrogate and additional family member identification;  Primary team - discussed plan of care HPI:  44 y.o male, unknown past medical history (reported stroke and MI by coworkers) presented to [facility] with AMS, Pt was working at [employer] when he was found down by coworkers. EMS called and pt brought to [facility] ED. Intubated, sedated, started on cardene for SBPs in 200s. CT head showed brain stem bleed. Transferred to NSICU for further management.  (30 Sep 2024 12:55) Palliative team is consulted to assist with GOC conversation given guarded prognosis.     Interval Events: Repeat CT head 6 hours since admission showed enlarged pontine hemorrhage with edema, mass effect and subarachnoid hemorrhage, repeat CT head 6 hours later was stable. As per discussion with primary team, the prognosis is poor. Life expectancy is difficult to predict at this time, but the likely expected course is to remain ventilator-dependent. As per collateral, from primary team patient is actually 44 y.o (not in his 70s as initially documented). As per chart review (the Ethics team note) patient  has a  living father (in [place]), sister (in [place]), son (in [place]), and daughter (in [place]).  On evaluation by palliative care team, roommate was present at bedside. He stated that the patient has family in [place] and an uncle in [place] and he maintains regular contact with them by phone. He has  never had family visit him at home because they all live in [place] except the uncle. In addition, he has not visited [place] in 15-20 years. As per the roommate, he had found many Tylenol wrappers in his room and he think he may have had  a headache on the days leading up to this. Otherwise, he was in his usual state of health, prior to this hospitalization. 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 He stated that the patient did have some prior medical problems ?strokes (but never this serious) and was supposed to take medications and visit doctors regularly, but he was not able to do that due to working long hours.     PERTINENT PM/SXH:   Acute myocardial infarction    CVA (cerebral vascular accident)        FAMILY HISTORY:  No history of  in first degree relatives according to chart  Unable to obtain due to patient's encephalopathy    ITEMS NOT CHECKED ARE NOT PRESENT  SOCIAL HISTORY:   Significant other/partner:  [No]  Children:  [ son and daughter]   Substance hx: Unknown   Home Opioid hx:  Unknown  - no active Rx's / see chart note  Living Situation: Home with roommate   Sikh/Spiritual practice: ; Unknown  Coping:  unable to assess  Support system: [employer]    ADVANCE DIRECTIVES:    Unknown   DECISION MAKER(s):  Pending discussion with Ethics. As per chart review, patient has a living father, sister, son and daughter (unknown age)          Name(s)/Phone Number(s):     Information at bedside:   Sister: Carisa Oh +52    Son Phil +52     BASELINE (I)ADLs (prior to admission):  [place]: Total     ALLERGIES:  Allergy Status Unknown    MEDICATIONS  (STANDING):  amLODIPine   Tablet 10 milliGRAM(s) Oral daily  chlorhexidine 0.12% Liquid 15 milliLiter(s) Oral Mucosa every 12 hours  chlorhexidine 2% Cloths 1 Application(s) Topical <User Schedule>  dexMEDEtomidine Infusion 0.2 MICROgram(s)/kG/Hr (4 mL/Hr) IV Continuous <Continuous>  insulin lispro (ADMELOG) corrective regimen sliding scale   SubCutaneous every 6 hours  labetalol 100 milliGRAM(s) Enteral Tube every 8 hours  lactated ringers. 1000 milliLiter(s) (80 mL/Hr) IV Continuous <Continuous>  niCARdipine Infusion 5 mG/Hr (25 mL/Hr) IV Continuous <Continuous>  pantoprazole  Injectable 40 milliGRAM(s) IV Push daily  polyethylene glycol 3350 17 Gram(s) Oral daily  senna 2 Tablet(s) Oral at bedtime  sodium chloride 3% + sodium acetate 50:50 1000 milliLiter(s) (30 mL/Hr) IV Continuous <Continuous>    MEDICATIONS  (PRN):  acetaminophen   Oral Liquid .. 650 milliGRAM(s) Oral every 6 hours PRN Temp greater or equal to 38C (100.4F), Mild Pain (1 - 3)  fentaNYL    Injectable 25 MICROGram(s) IV Push every 2 hours PRN agitation, severe pain, vent desynchrony    Analgesic Use (Scheduled and PRNs) for past 24 hours:    dexMEDEtomidine Infusion   4 mL/Hr IV Continuous (09-30-24 @ 22:37)    dexMEDEtomidine Infusion   4 mL/Hr IV Continuous (09-30-24 @ 20:22)    propofol Infusion   2.4 mL/Hr IV Continuous (09-30-24 @ 11:43)      PRESENT SYMPTOMS/REVIEW OF SYSTEMS: [X]Unable to obtain due to poor mentation/encephalopathy    Pain: Unable to assess due to Encephalopathy, however patient appears comfortable in bed       CPOT Score: 0  (Pain Assessment Scale for Critically Ill)    Dyspnea:                           Currently on Ventilator   Anxiety:                             Unable to assess   Fatigue:                            Unable to assess   Nausea:                            Unable to assess   Loss of Appetite:              Unable to assess   Constipation:                   On bowel regimen: senna and miralax     Other Symptoms:  [x]All Other Review Of Systems Negative     Palliative Performance Status Version 2: 10 %  (Functional Assessment Tool)    PHYSICAL EXAM:  GENERAL:  []Alert  []Oriented    []Lethargic  []Cachexia  [x]Unarousable  []Verbal  []Non-Verbal  Behavioral:   []Anxiety  []Delirium []Agitation []Cooperative   HEENT:  []Normal   []Dry mouth   [x]ET Tube/Trach  []Oral lesions  PULMONARY:   [x]Clear []Tachypnea  []Audible excessive secretions  [x]Normal Work of Breathing []Labored Breathing  []Rhonchi []Crackles []Wheezing  CARDIOVASCULAR:    [x]Regular Rate & Rhythm []Irregular []Tachy  []Alexi  GASTROINTESTINAL:  [x]Soft  []Distended   [x]+BS  [x]Non tender []Tender  []PEG []OGT/ NGT  Last BM:  GENITOURINARY:  []Normal [] Incontinent   []Oliguria/Anuria   [x]Vuong  MUSCULOSKELETAL:   [x]Normal   []Weakness  []Bed/Wheelchair bound [x]NO Edema  NEUROLOGIC:   []No focal deficits  []Cognitive impairment  []Dysphagia []Dysarthria []Paresis [x]Encephalopathic, does not follow command, unarousable   SKIN:   [x]Normal   []Pressure ulcer(s)  []Rash    CRITICAL CARE:  []Shock Present  []Septic []Cardiogenic []Neurologic []Hypovolemic  []Vasopressors []Inotropes   [x]Respiratory failure present [x]Mechanical Ventilation []Non-invasive ventilatory support []High-Flow  []Acute  []Chronic []Hypoxic  []Hypercarbic [ x] Airway protection, i/s/o LOC   []Other organ failure    Vital Signs Last 24 Hrs  T(C): 37.3 (01 Oct 2024 09:10), Max: 37.6 (01 Oct 2024 01:03)  T(F): 99.2 (01 Oct 2024 09:10), Max: 99.6 (01 Oct 2024 01:03)  HR: 85 (01 Oct 2024 11:00) (70 - 105)  BP: 145/90 (01 Oct 2024 11:00) (115/58 - 170/103)  BP(mean): 112 (01 Oct 2024 11:00) (80 - 131)  RR: 16 (01 Oct 2024 11:00) (12 - 21)  SpO2: 99% (01 Oct 2024 11:00) (97% - 100%)    Parameters below as of 01 Oct 2024 12:00  Patient On (Oxygen Delivery Method): ventilator    O2 Concentration (%): 40    LABS:                        11.9   8.07  )-----------( 206      ( 01 Oct 2024 05:30 )             37.9   10-01    150[H]  |  123[H]  |  38[H]  ----------------------------<  103[H]  4.6   |  18[L]  |  3.38[H]    Ca    7.8[L]      01 Oct 2024 09:23  Phos  3.8     10-01  Mg     2.3     10-01    TPro  6.7  /  Alb  3.6  /  TBili  0.5  /  DBili  x   /  AST  14  /  ALT  13  /  AlkPhos  143[H]  09-30    RADIOLOGY & ADDITIONAL STUDIES:      REFERRALS:  [x]Social Work  []Case management []PT/OT []Chaplaincy  []Hospice  []Patient/Family Support []Massage Therapy []Music Therapy    DISCUSSION OF CASE: Family - Pending further discussion with ethics team and primary team regarding surrogate decision maker and additional family member identification;  Primary team - discussed plan of care HPI:  44 y.o male, unknown past medical history (reported stroke and MI by coworkers) presented to [facility] with AMS, Pt was working at [employer] when he was found down by coworkers. EMS called and pt brought to [facility] ED. Intubated, sedated, started on cardene for SBPs in 200s. CT head showed brain stem bleed. Transferred to NSICU for further management.  (30 Sep 2024 12:55) Palliative team is consulted to assist with GOC conversation given guarded prognosis.     Interval Events: Repeat CT head 6 hours since admission showed enlarged pontine hemorrhage with edema, mass effect and subarachnoid hemorrhage, repeat CT head 6 hours later was stable. As per discussion with primary team, the prognosis is poor. Life expectancy is difficult to predict at this time, but the likely expected course is to remain ventilator-dependent. As per collateral, from primary team patient is actually 44 y.o (not in his 70s as initially documented). As per chart review (the Ethics team note) patient  has a  living father (in [place]), sister (in [place]), son (in [place]), and daughter (in [place]).  On evaluation by palliative care team, roommate was present at bedside. He stated that the patient has family in [place] and an uncle in [place] and he maintains regular contact with them by phone. He has  never had family visit him at home because they all live in [place] except the uncle. In addition, he has not visited [place] in 15-20 years. As per the roommate, he had found many Tylenol wrappers in his room and he think he may have had  a headache on the days leading up to this. Otherwise, he was in his usual state of health, prior to this hospitalization. 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 He stated that the patient did have some prior medical problems ?strokes (but never this serious) and was supposed to take medications and visit doctors regularly, but he was not able to do that due to working long hours.     PERTINENT PM/SXH:   Acute myocardial infarction    CVA (cerebral vascular accident)    FAMILY HISTORY:  No history of CVA in first degree relatives according to chart  Unable to obtain due to patient's encephalopathy    ITEMS NOT CHECKED ARE NOT PRESENT  SOCIAL HISTORY:   Significant other/partner:  [No]  Children:  [ son and daughter]   Substance hx: Unknown   Home Opioid hx:  Unknown  - no active Rx's / see chart note  Living Situation: Home with roommate   Bahai/Spiritual practice: ; Unknown  Coping:  unable to assess  Support system: [employer]    ADVANCE DIRECTIVES:    Unknown   DECISION MAKER(s):  Pending discussion with Ethics. As per chart review, patient has a living father, sister, son and daughter (unknown age)          Name(s)/Phone Number(s):     Information at bedside:   Sister: Carisa Oh +52    Son Phil +52     BASELINE (I)ADLs (prior to admission):  [place]: Total     ALLERGIES:  Allergy Status Unknown    MEDICATIONS  (STANDING):  amLODIPine   Tablet 10 milliGRAM(s) Oral daily  chlorhexidine 0.12% Liquid 15 milliLiter(s) Oral Mucosa every 12 hours  chlorhexidine 2% Cloths 1 Application(s) Topical <User Schedule>  dexMEDEtomidine Infusion 0.2 MICROgram(s)/kG/Hr (4 mL/Hr) IV Continuous <Continuous>  insulin lispro (ADMELOG) corrective regimen sliding scale   SubCutaneous every 6 hours  labetalol 100 milliGRAM(s) Enteral Tube every 8 hours  lactated ringers. 1000 milliLiter(s) (80 mL/Hr) IV Continuous <Continuous>  niCARdipine Infusion 5 mG/Hr (25 mL/Hr) IV Continuous <Continuous>  pantoprazole  Injectable 40 milliGRAM(s) IV Push daily  polyethylene glycol 3350 17 Gram(s) Oral daily  senna 2 Tablet(s) Oral at bedtime  sodium chloride 3% + sodium acetate 50:50 1000 milliLiter(s) (30 mL/Hr) IV Continuous <Continuous>    MEDICATIONS  (PRN):  acetaminophen   Oral Liquid .. 650 milliGRAM(s) Oral every 6 hours PRN Temp greater or equal to 38C (100.4F), Mild Pain (1 - 3)  fentaNYL    Injectable 25 MICROGram(s) IV Push every 2 hours PRN agitation, severe pain, vent desynchrony    Analgesic Use (Scheduled and PRNs) for past 24 hours:    dexMEDEtomidine Infusion   4 mL/Hr IV Continuous (09-30-24 @ 22:37)    dexMEDEtomidine Infusion   4 mL/Hr IV Continuous (09-30-24 @ 20:22)    propofol Infusion   2.4 mL/Hr IV Continuous (09-30-24 @ 11:43)      PRESENT SYMPTOMS/REVIEW OF SYSTEMS: [X]Unable to obtain due to poor mentation/encephalopathy    Pain: Unable to assess due to Encephalopathy, however patient appears comfortable in bed       CPOT Score: 0  (Pain Assessment Scale for Critically Ill)    Dyspnea:                           Currently on Ventilator   Anxiety:                             Unable to assess   Fatigue:                            Unable to assess   Nausea:                            Unable to assess   Loss of Appetite:              Unable to assess   Constipation:                   On bowel regimen: senna and miralax     Other Symptoms:  [x]All Other Review Of Systems Negative     Palliative Performance Status Version 2: 10 %  (Functional Assessment Tool)    PHYSICAL EXAM:  GENERAL:  []Alert  []Oriented    []Lethargic  []Cachexia  [x]Unarousable  []Verbal  []Non-Verbal  Behavioral:   []Anxiety  []Delirium []Agitation []Cooperative   HEENT:  []Normal   []Dry mouth   [x]ET Tube/Trach  []Oral lesions  PULMONARY:   [x]Clear []Tachypnea  []Audible excessive secretions  [x]Normal Work of Breathing []Labored Breathing  []Rhonchi []Crackles []Wheezing  CARDIOVASCULAR:    [x]Regular Rate & Rhythm []Irregular []Tachy  []Alexi  GASTROINTESTINAL:  [x]Soft  []Distended   [x]+BS  [x]Non tender []Tender  []PEG []OGT/ NGT  Last BM:  GENITOURINARY:  []Normal [] Incontinent   []Oliguria/Anuria   [x]Vuong  MUSCULOSKELETAL:   [x]Normal   []Weakness  []Bed/Wheelchair bound [x]NO Edema  NEUROLOGIC:   []No focal deficits  []Cognitive impairment  []Dysphagia []Dysarthria []Paresis [x]Encephalopathic, does not follow command, unarousable   SKIN:   [x]Normal   []Pressure ulcer(s)  []Rash    CRITICAL CARE:  []Shock Present  []Septic []Cardiogenic []Neurologic []Hypovolemic  []Vasopressors []Inotropes   [x]Respiratory failure present [x]Mechanical Ventilation []Non-invasive ventilatory support []High-Flow  []Acute  []Chronic []Hypoxic  []Hypercarbic [ x] Airway protection, i/s/o LOC   []Other organ failure    Vital Signs Last 24 Hrs  T(C): 37.3 (01 Oct 2024 09:10), Max: 37.6 (01 Oct 2024 01:03)  T(F): 99.2 (01 Oct 2024 09:10), Max: 99.6 (01 Oct 2024 01:03)  HR: 85 (01 Oct 2024 11:00) (70 - 105)  BP: 145/90 (01 Oct 2024 11:00) (115/58 - 170/103)  BP(mean): 112 (01 Oct 2024 11:00) (80 - 131)  RR: 16 (01 Oct 2024 11:00) (12 - 21)  SpO2: 99% (01 Oct 2024 11:00) (97% - 100%)    Parameters below as of 01 Oct 2024 12:00  Patient On (Oxygen Delivery Method): ventilator    O2 Concentration (%): 40    LABS:                        11.9   8.07  )-----------( 206      ( 01 Oct 2024 05:30 )             37.9   10-01    150[H]  |  123[H]  |  38[H]  ----------------------------<  103[H]  4.6   |  18[L]  |  3.38[H]    Ca    7.8[L]      01 Oct 2024 09:23  Phos  3.8     10-01  Mg     2.3     10-01    TPro  6.7  /  Alb  3.6  /  TBili  0.5  /  DBili  x   /  AST  14  /  ALT  13  /  AlkPhos  143[H]  09-30    RADIOLOGY & ADDITIONAL STUDIES:  < from: CT Head No Cont (09.30.24 @ 23:41) >  TECHNIQUE:  Serial axial images were obtained from the skull base to the   vertex using multi-slice helical technique. Sagittal and coronal   reformats were obtained.    FINDINGS:    VENTRICLES AND SULCI: Stable ventricular size.  INTRA-AXIAL: Revisualization of pontine hemorrhage, there has been   marginal interval enlargement in the craniocaudal dimension with stable   edema and mass effect of the fourth ventricle and cerebral aqueduct.   Unchanged subarachnoid hemorrhage ofthe posterior right frontal   lobe/sylvian fissure and posterior left frontal lobe subarachnoid   hemorrhage. Old LEFT basal ganglia infarction again noted.  EXTRA-AXIAL: No mass or fluid collection. Basal cisterns are normal in   appearance.    VISUALIZED SINUSES:  Clear.  TYMPANOMASTOID CAVITIES:  Clear.  VISUALIZED ORBITS: Normal.  CALVARIUM: Intact.    MISCELLANEOUS: None.      IMPRESSION:  Since most recent prior exam, grossly stable appearance of pontine   hemorrhage with associated edema and mass effect of the fourth ventricle.   Stable posterior right frontal lobe and posterior left frontal lobe   subarachnoid hemorrhage.    < end of copied text >      REFERRALS:  [x]Social Work  []Case management []PT/OT []Chaplaincy  []Hospice  []Patient/Family Support []Massage Therapy []Music Therapy    DISCUSSION OF CASE: Family - Pending further discussion with ethics team and primary team regarding surrogate decision maker and additional family member identification;  Primary team and Ethics team - discussed plan of care

## 2024-10-01 NOTE — PROGRESS NOTE ADULT - SUBJECTIVE AND OBJECTIVE BOX
NSCU Progress Note    Assessment/Hospital Course:        24 Hour Events/Subjective:  - interval scan stable      REVIEW OF SYSTEMS:  - negative except as above    VITALS:   - reviewed      IMAGING/DATA:   - Reviewed          PHYSICAL EXAM:    General: intubated  CVS: RRR  Pulm: CTAB  GI: Soft, NTND  Extremities: No LE Edema  Neuro: intubated, AOx0, 2mm reactive pupils, uppers extensor, BLE 0/5

## 2024-10-02 LAB
ANION GAP SERPL CALC-SCNC: 10 MMOL/L — SIGNIFICANT CHANGE UP (ref 5–17)
ANION GAP SERPL CALC-SCNC: 11 MMOL/L — SIGNIFICANT CHANGE UP (ref 5–17)
ANION GAP SERPL CALC-SCNC: 11 MMOL/L — SIGNIFICANT CHANGE UP (ref 5–17)
BASE EXCESS BLDA CALC-SCNC: -1.5 MMOL/L — SIGNIFICANT CHANGE UP (ref -2–3)
BASE EXCESS BLDA CALC-SCNC: 2.9 MMOL/L — SIGNIFICANT CHANGE UP (ref -2–3)
BUN SERPL-MCNC: 30 MG/DL — HIGH (ref 7–23)
BUN SERPL-MCNC: 34 MG/DL — HIGH (ref 7–23)
BUN SERPL-MCNC: 34 MG/DL — HIGH (ref 7–23)
CALCIUM SERPL-MCNC: 7.7 MG/DL — LOW (ref 8.4–10.5)
CALCIUM SERPL-MCNC: 7.9 MG/DL — LOW (ref 8.4–10.5)
CALCIUM SERPL-MCNC: 7.9 MG/DL — LOW (ref 8.4–10.5)
CHLORIDE SERPL-SCNC: 120 MMOL/L — HIGH (ref 96–108)
CHLORIDE SERPL-SCNC: 121 MMOL/L — HIGH (ref 96–108)
CHLORIDE SERPL-SCNC: 122 MMOL/L — HIGH (ref 96–108)
CO2 BLDA-SCNC: 24 MMOL/L — SIGNIFICANT CHANGE UP (ref 19–24)
CO2 BLDA-SCNC: 29 MMOL/L — HIGH (ref 19–24)
CO2 SERPL-SCNC: 21 MMOL/L — LOW (ref 22–31)
CO2 SERPL-SCNC: 24 MMOL/L — SIGNIFICANT CHANGE UP (ref 22–31)
CO2 SERPL-SCNC: 29 MMOL/L — SIGNIFICANT CHANGE UP (ref 22–31)
CREAT SERPL-MCNC: 3.15 MG/DL — HIGH (ref 0.5–1.3)
CREAT SERPL-MCNC: 3.22 MG/DL — HIGH (ref 0.5–1.3)
CREAT SERPL-MCNC: 3.35 MG/DL — HIGH (ref 0.5–1.3)
EGFR: 22 ML/MIN/1.73M2 — LOW
EGFR: 22 ML/MIN/1.73M2 — LOW
EGFR: 23 ML/MIN/1.73M2 — LOW
EGFR: 23 ML/MIN/1.73M2 — LOW
EGFR: 24 ML/MIN/1.73M2 — LOW
EGFR: 24 ML/MIN/1.73M2 — LOW
GLUCOSE BLDC GLUCOMTR-MCNC: 104 MG/DL — HIGH (ref 70–99)
GLUCOSE BLDC GLUCOMTR-MCNC: 109 MG/DL — HIGH (ref 70–99)
GLUCOSE BLDC GLUCOMTR-MCNC: 133 MG/DL — HIGH (ref 70–99)
GLUCOSE SERPL-MCNC: 116 MG/DL — HIGH (ref 70–99)
GLUCOSE SERPL-MCNC: 119 MG/DL — HIGH (ref 70–99)
GLUCOSE SERPL-MCNC: 130 MG/DL — HIGH (ref 70–99)
GRAM STN FLD: ABNORMAL
HCO3 BLDA-SCNC: 23 MMOL/L — SIGNIFICANT CHANGE UP (ref 21–28)
HCO3 BLDA-SCNC: 28 MMOL/L — SIGNIFICANT CHANGE UP (ref 21–28)
HCT VFR BLD CALC: 36.6 % — LOW (ref 39–50)
HGB BLD-MCNC: 11.6 G/DL — LOW (ref 13–17)
HOROWITZ INDEX BLDA+IHG-RTO: 50 — SIGNIFICANT CHANGE UP
MAGNESIUM SERPL-MCNC: 2.1 MG/DL — SIGNIFICANT CHANGE UP (ref 1.6–2.6)
MCHC RBC-ENTMCNC: 28 PG — SIGNIFICANT CHANGE UP (ref 27–34)
MCHC RBC-ENTMCNC: 31.7 GM/DL — LOW (ref 32–36)
MCV RBC AUTO: 88.2 FL — SIGNIFICANT CHANGE UP (ref 80–100)
MRSA PCR RESULT.: NEGATIVE — SIGNIFICANT CHANGE UP
NRBC # BLD: 0 /100 WBCS — SIGNIFICANT CHANGE UP (ref 0–0)
NRBC BLD-RTO: 0 /100 WBCS — SIGNIFICANT CHANGE UP (ref 0–0)
PCO2 BLDA: 37 MMHG — SIGNIFICANT CHANGE UP (ref 35–48)
PCO2 BLDA: 43 MMHG — SIGNIFICANT CHANGE UP (ref 35–48)
PH BLDA: 7.4 — SIGNIFICANT CHANGE UP (ref 7.35–7.45)
PH BLDA: 7.42 — SIGNIFICANT CHANGE UP (ref 7.35–7.45)
PHOSPHATE SERPL-MCNC: 3.7 MG/DL — SIGNIFICANT CHANGE UP (ref 2.5–4.5)
PLATELET # BLD AUTO: 201 K/UL — SIGNIFICANT CHANGE UP (ref 150–400)
PO2 BLDA: 138 MMHG — HIGH (ref 83–108)
PO2 BLDA: 97 MMHG — SIGNIFICANT CHANGE UP (ref 83–108)
POTASSIUM SERPL-MCNC: 3.6 MMOL/L — SIGNIFICANT CHANGE UP (ref 3.5–5.3)
POTASSIUM SERPL-MCNC: 3.8 MMOL/L — SIGNIFICANT CHANGE UP (ref 3.5–5.3)
POTASSIUM SERPL-MCNC: 3.8 MMOL/L — SIGNIFICANT CHANGE UP (ref 3.5–5.3)
POTASSIUM SERPL-SCNC: 3.6 MMOL/L — SIGNIFICANT CHANGE UP (ref 3.5–5.3)
POTASSIUM SERPL-SCNC: 3.8 MMOL/L — SIGNIFICANT CHANGE UP (ref 3.5–5.3)
POTASSIUM SERPL-SCNC: 3.8 MMOL/L — SIGNIFICANT CHANGE UP (ref 3.5–5.3)
RBC # BLD: 4.15 M/UL — LOW (ref 4.2–5.8)
RBC # FLD: 13.7 % — SIGNIFICANT CHANGE UP (ref 10.3–14.5)
S AUREUS DNA NOSE QL NAA+PROBE: POSITIVE
SAO2 % BLDA: 98 % — SIGNIFICANT CHANGE UP (ref 94–98)
SAO2 % BLDA: 99.1 % — HIGH (ref 94–98)
SODIUM SERPL-SCNC: 153 MMOL/L — HIGH (ref 135–145)
SODIUM SERPL-SCNC: 157 MMOL/L — HIGH (ref 135–145)
SODIUM SERPL-SCNC: 159 MMOL/L — HIGH (ref 135–145)
SPECIMEN SOURCE: SIGNIFICANT CHANGE UP
WBC # BLD: 9.42 K/UL — SIGNIFICANT CHANGE UP (ref 3.8–10.5)
WBC # FLD AUTO: 9.42 K/UL — SIGNIFICANT CHANGE UP (ref 3.8–10.5)

## 2024-10-02 PROCEDURE — 99291 CRITICAL CARE FIRST HOUR: CPT

## 2024-10-02 PROCEDURE — 71045 X-RAY EXAM CHEST 1 VIEW: CPT | Mod: 26

## 2024-10-02 RX ORDER — LABETALOL HYDROCHLORIDE 200 MG/1
200 TABLET, FILM COATED ORAL EVERY 8 HOURS
Refills: 0 | Status: DISCONTINUED | OUTPATIENT
Start: 2024-10-02 | End: 2024-10-08

## 2024-10-02 RX ORDER — PROPOFOL 10 MG/ML
10 INJECTION, EMULSION INTRAVENOUS
Qty: 1000 | Refills: 0 | Status: DISCONTINUED | OUTPATIENT
Start: 2024-10-02 | End: 2024-10-02

## 2024-10-02 RX ORDER — VANCOMYCIN HCL IN 5 % DEXTROSE 1.5G/250ML
1250 PLASTIC BAG, INJECTION (ML) INTRAVENOUS EVERY 24 HOURS
Refills: 0 | Status: DISCONTINUED | OUTPATIENT
Start: 2024-10-02 | End: 2024-10-02

## 2024-10-02 RX ORDER — FENTANYL CITRATE-0.9 % NACL/PF 100MCG/2ML
25 SYRINGE (ML) INTRAVENOUS ONCE
Refills: 0 | Status: DISCONTINUED | OUTPATIENT
Start: 2024-10-02 | End: 2024-10-02

## 2024-10-02 RX ORDER — IPRATROPIUM BROMIDE AND ALBUTEROL SULFATE .5; 2.5 MG/3ML; MG/3ML
3 SOLUTION RESPIRATORY (INHALATION) EVERY 6 HOURS
Refills: 0 | Status: DISCONTINUED | OUTPATIENT
Start: 2024-10-02 | End: 2024-10-09

## 2024-10-02 RX ORDER — SENNA 187 MG
2 TABLET ORAL
Refills: 0 | Status: DISCONTINUED | OUTPATIENT
Start: 2024-10-02 | End: 2024-10-03

## 2024-10-02 RX ORDER — ACETYLCYSTEINE 200 MG/ML
4 INHALANT RESPIRATORY (INHALATION) EVERY 6 HOURS
Refills: 0 | Status: DISCONTINUED | OUTPATIENT
Start: 2024-10-02 | End: 2024-10-09

## 2024-10-02 RX ORDER — CEFTRIAXONE 500 MG/1
2000 INJECTION, POWDER, FOR SOLUTION INTRAMUSCULAR; INTRAVENOUS EVERY 24 HOURS
Refills: 0 | Status: DISCONTINUED | OUTPATIENT
Start: 2024-10-02 | End: 2024-10-03

## 2024-10-02 RX ORDER — VANCOMYCIN HCL IN 5 % DEXTROSE 1.5G/250ML
1250 PLASTIC BAG, INJECTION (ML) INTRAVENOUS ONCE
Refills: 0 | Status: COMPLETED | OUTPATIENT
Start: 2024-10-02 | End: 2024-10-02

## 2024-10-02 RX ADMIN — IPRATROPIUM BROMIDE AND ALBUTEROL SULFATE 3 MILLILITER(S): .5; 2.5 SOLUTION RESPIRATORY (INHALATION) at 15:59

## 2024-10-02 RX ADMIN — AMLODIPINE BESYLATE 10 MILLIGRAM(S): 10 TABLET ORAL at 06:32

## 2024-10-02 RX ADMIN — Medication 650 MILLIGRAM(S): at 17:34

## 2024-10-02 RX ADMIN — POLYETHYLENE GLYCOL 3350 17 GRAM(S): 17 POWDER, FOR SOLUTION ORAL at 11:44

## 2024-10-02 RX ADMIN — LABETALOL HYDROCHLORIDE 200 MILLIGRAM(S): 200 TABLET, FILM COATED ORAL at 22:16

## 2024-10-02 RX ADMIN — Medication 25 MICROGRAM(S): at 06:29

## 2024-10-02 RX ADMIN — Medication 25 MICROGRAM(S): at 00:50

## 2024-10-02 RX ADMIN — CEFTRIAXONE 100 MILLIGRAM(S): 500 INJECTION, POWDER, FOR SOLUTION INTRAMUSCULAR; INTRAVENOUS at 11:43

## 2024-10-02 RX ADMIN — Medication 25 MICROGRAM(S): at 14:26

## 2024-10-02 RX ADMIN — Medication 1 APPLICATION(S): at 06:53

## 2024-10-02 RX ADMIN — Medication 2 TABLET(S): at 00:50

## 2024-10-02 RX ADMIN — ACETYLCYSTEINE 4 MILLILITER(S): 200 INHALANT RESPIRATORY (INHALATION) at 16:03

## 2024-10-02 RX ADMIN — IPRATROPIUM BROMIDE AND ALBUTEROL SULFATE 3 MILLILITER(S): .5; 2.5 SOLUTION RESPIRATORY (INHALATION) at 22:13

## 2024-10-02 RX ADMIN — Medication 25 MICROGRAM(S): at 10:56

## 2024-10-02 RX ADMIN — Medication 650 MILLIGRAM(S): at 01:30

## 2024-10-02 RX ADMIN — Medication 15 MILLILITER(S): at 17:41

## 2024-10-02 RX ADMIN — Medication 4 MILLILITER(S): at 22:24

## 2024-10-02 RX ADMIN — Medication 15 MILLILITER(S): at 06:32

## 2024-10-02 RX ADMIN — IPRATROPIUM BROMIDE AND ALBUTEROL SULFATE 3 MILLILITER(S): .5; 2.5 SOLUTION RESPIRATORY (INHALATION) at 10:57

## 2024-10-02 RX ADMIN — LABETALOL HYDROCHLORIDE 200 MILLIGRAM(S): 200 TABLET, FILM COATED ORAL at 12:08

## 2024-10-02 RX ADMIN — Medication 5 MILLIGRAM(S): at 17:41

## 2024-10-02 RX ADMIN — Medication 25 MICROGRAM(S): at 14:59

## 2024-10-02 RX ADMIN — Medication 25 MICROGRAM(S): at 03:45

## 2024-10-02 RX ADMIN — ACETYLCYSTEINE 4 MILLILITER(S): 200 INHALANT RESPIRATORY (INHALATION) at 04:31

## 2024-10-02 RX ADMIN — LABETALOL HYDROCHLORIDE 100 MILLIGRAM(S): 200 TABLET, FILM COATED ORAL at 00:50

## 2024-10-02 RX ADMIN — PROPOFOL 4.8 MICROGRAM(S)/KG/MIN: 10 INJECTION, EMULSION INTRAVENOUS at 04:33

## 2024-10-02 RX ADMIN — Medication 25 MICROGRAM(S): at 03:30

## 2024-10-02 RX ADMIN — ACETYLCYSTEINE 4 MILLILITER(S): 200 INHALANT RESPIRATORY (INHALATION) at 22:14

## 2024-10-02 RX ADMIN — LABETALOL HYDROCHLORIDE 100 MILLIGRAM(S): 200 TABLET, FILM COATED ORAL at 06:32

## 2024-10-02 RX ADMIN — Medication 4 MILLILITER(S): at 15:59

## 2024-10-02 RX ADMIN — Medication 25 MICROGRAM(S): at 10:26

## 2024-10-02 RX ADMIN — Medication 650 MILLIGRAM(S): at 00:51

## 2024-10-02 RX ADMIN — Medication 650 MILLIGRAM(S): at 18:30

## 2024-10-02 RX ADMIN — Medication 166.67 MILLIGRAM(S): at 12:00

## 2024-10-02 RX ADMIN — Medication 40 MILLIEQUIVALENT(S): at 17:36

## 2024-10-02 RX ADMIN — Medication 40 MILLIGRAM(S): at 11:44

## 2024-10-02 RX ADMIN — Medication 2 TABLET(S): at 22:16

## 2024-10-02 NOTE — PROGRESS NOTE ADULT - ASSESSMENT
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 CPAP as tolerated  ABG, CXR  VAP bundle  ABG    CV:  SBP goal 100-160  labetalol increase 300mg tid  amlodipine 10  Katt      RENAL:   ANIKA though suspect CKD  Fluids: LR at 80cc/hr; DC HTS  Vuong catheter in place; monitor Is/Os  Na goal 145-155  BMPq12    GI:  Diet: TF  GI prophylaxis [] not indicated [x] PPI [] other:  Bowel regimen [] colace [x] senna [] other: miralax  Monitor LFTs    ENDO:   Goal euglycemia (-180)      HEME/ONC:  VTE prophylaxis: [x] SCDs [x] chemoprophylaxis      ID:   Mild leukocytosis  f/u pan culture  vanc/zosyn (10/2 - )  MRSA swab

## 2024-10-02 NOTE — PROGRESS NOTE ADULT - SUBJECTIVE AND OBJECTIVE BOX
S/Overnight events: Intubated, off sedation. Unable to assess subjective data given pt's poor neurologic exam. No acute events overnight.     Hospital Course:   9/30: transferred from [facility]. A line placed. Arben Rader at bedside, states pt has family in [place], cannot confirm medications or PMH other than stroke and MI. 250cc bolus 3% given. LR switched to NS. hydralazine 25q8 started, 3% started, switched propofol to precedex   10/1: stability CTH done. Added labetalol, started TF. Palliative consulted. ethics consulted to determine surrogate. febrile 103, pan cx sent  10/2: BD 2, GEORGE overnight. TF resumed    Vital Signs Last 24 Hrs  T(C): 38.2 (02 Oct 2024 00:53), Max: 39.8 (01 Oct 2024 18:00)  T(F): 100.8 (02 Oct 2024 00:53), Max: 103.6 (01 Oct 2024 18:00)  HR: 99 (02 Oct 2024 00:00) (70 - 119)  BP: 134/80 (02 Oct 2024 00:00) (118/74 - 153/92)  BP(mean): 101 (02 Oct 2024 00:00) (89 - 113)  RR: 16 (02 Oct 2024 00:00) (15 - 19)  SpO2: 99% (02 Oct 2024 00:00) (98% - 100%)    Parameters below as of 02 Oct 2024 00:00  Patient On (Oxygen Delivery Method): ventilator    O2 Concentration (%): 40    I&O's Detail    30 Sep 2024 07:01  -  01 Oct 2024 07:00  --------------------------------------------------------  IN:    Dexmedetomidine: 24 mL    Dexmedetomidine: 72 mL    IV PiggyBack: 416.7 mL    IV PiggyBack: 350 mL    Lactated Ringers: 560 mL    NiCARdipine: 317.5 mL    Propofol: 158.4 mL    sodium chloride 0.9%: 1120 mL    sodium chloride 3%: 300 mL  Total IN: 3318.6 mL    OUT:    Indwelling Catheter - Urethral (mL): 2470 mL  Total OUT: 2470 mL    Total NET: 848.6 mL      01 Oct 2024 07:01  -  02 Oct 2024 01:02  --------------------------------------------------------  IN:    Dexmedetomidine: 16 mL    Free Water: 300 mL    Jevity 1.2: 90 mL    Lactated Ringers: 90 mL    Lactated Ringers: 350 mL    NiCARdipine: 325 mL    sodium chloride 3% + sodium acetate 50:50: 460 mL  Total IN: 1631 mL    OUT:    Indwelling Catheter - Urethral (mL): 1540 mL  Total OUT: 1540 mL    Total NET: 91 mL        I&O's Summary    30 Sep 2024 07:01  -  01 Oct 2024 07:00  --------------------------------------------------------  IN: 3318.6 mL / OUT: 2470 mL / NET: 848.6 mL    01 Oct 2024 07:01  -  02 Oct 2024 01:02  --------------------------------------------------------  IN: 1631 mL / OUT: 1540 mL / NET: 91 mL        PHYSICAL EXAM:  Constitutional: Intubated, NAD.   Respiratory: breathing non-labored, symmetrical chest wall movement  Cardiovascular: tachycardic, regular  Gastrointestinal: abdomen soft  Neurological:  Not alert, does not open eyes to voice, oriented x0  Pupils 3mm and sluggish  Pt appears to follow command to deviate gaze downwards when asked in [place]  RUE: trace withdrawal from noxious   LUE: Extends from noxious   RLE: Withdraws from noxious  LLE: triple flexes from noxious     TUBES/LINES:  [] CVC  [x] A-line  [] Lumbar Drain  [] Ventriculostomy  [] Other    DIET:  [] NPO  [] Mechanical  [x] Tube feeds    LABS:                        11.9   8.07  )-----------( 206      ( 01 Oct 2024 05:30 )             37.9     10-01    149[H]  |  122[H]  |  36[H]  ----------------------------<  117[H]  3.9   |  18[L]  |  3.59[H]    Ca    7.8[L]      01 Oct 2024 19:11  Phos  3.8     10-01  Mg     2.3     10-01    TPro  6.7  /  Alb  3.6  /  TBili  0.5  /  DBili  x   /  AST  14  /  ALT  13  /  AlkPhos  143[H]  09-30    PT/INR - ( 30 Sep 2024 08:56 )   PT: 10.3 sec;   INR: 0.89          PTT - ( 30 Sep 2024 08:54 )  PTT:28.4 sec  Urinalysis Basic - ( 01 Oct 2024 19:11 )    Color: x / Appearance: x / SG: x / pH: x  Gluc: 117 mg/dL / Ketone: x  / Bili: x / Urobili: x   Blood: x / Protein: x / Nitrite: x   Leuk Esterase: x / RBC: x / WBC x   Sq Epi: x / Non Sq Epi: x / Bacteria: x          CAPILLARY BLOOD GLUCOSE      POCT Blood Glucose.: 97 mg/dL (01 Oct 2024 17:32)  POCT Blood Glucose.: 90 mg/dL (01 Oct 2024 11:14)  POCT Blood Glucose.: 113 mg/dL (01 Oct 2024 06:20)      Drug Levels: [] N/A    CSF Analysis: [] N/A      Allergies    Allergy Status Unknown    Intolerances      MEDICATIONS:  Antibiotics:    Neuro:  acetaminophen   Oral Liquid .. 650 milliGRAM(s) Oral every 6 hours PRN  dexMEDEtomidine Infusion 0.2 MICROgram(s)/kG/Hr IV Continuous <Continuous>  fentaNYL    Injectable 25 MICROGram(s) IV Push every 2 hours PRN    Anticoagulation:    OTHER:  amLODIPine   Tablet 10 milliGRAM(s) Oral daily  chlorhexidine 0.12% Liquid 15 milliLiter(s) Oral Mucosa every 12 hours  chlorhexidine 2% Cloths 1 Application(s) Topical <User Schedule>  insulin lispro (ADMELOG) corrective regimen sliding scale   SubCutaneous every 6 hours  labetalol 100 milliGRAM(s) Enteral Tube every 8 hours  niCARdipine Infusion 5 mG/Hr IV Continuous <Continuous>  pantoprazole  Injectable 40 milliGRAM(s) IV Push daily  polyethylene glycol 3350 17 Gram(s) Oral daily  senna 2 Tablet(s) Oral at bedtime    IVF:  lactated ringers. 1000 milliLiter(s) IV Continuous <Continuous>  sodium chloride 3% + sodium acetate 50:50 1000 milliLiter(s) IV Continuous <Continuous>    CULTURES:    RADIOLOGY & ADDITIONAL TESTS:      ASSESSMENT:    45 y/o PMH ?stroke/MI present to [facility] after collapsing at work. Decorticate posturing, vomiting, intubated for airway protection. 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 Transferred to [facility] for further management.     Neuro:  - neuro/vitals q1  - sedation: precedex gtt (RASS -2 - -3)  - pain control: fentanyl 25q2 prn  - CTH 9/30: enlarged pontine hemorrhage, repeat done: pre-fung read stable   - stroke core measures, stroke neuro following  - palliative and ethics following, attempting to establish HCP    CV:  - SBP<160, cardene gtt  - HTN: norvasc 10mg, labetalol 100q8  - echo (9/30) EF 75%    Resp:  - Intubated, FVS    GI:  - TF via OGT  - bowel reg  - protonix while intubated    Renal:  - LR@50cc/hr, 3% w/ acetate @ 30cc/hr  - Na 145-150  - +gabriel  - trend BUN/Cr: possible CKD?  - renal US 10/1: echogenicity c/w chronic med renal dz    Endo:  - ISS  - a1c 5.4    Heme:  - DVT ppx: SCDs    ID:  - pan cx 10/1    Dispo: NSICU, full code    D/w Dr. Manzo, Dr. Llanes

## 2024-10-02 NOTE — PROGRESS NOTE ADULT - SUBJECTIVE AND OBJECTIVE BOX
NSCU Progress Note    Assessment/Hospital Course:        24 Hour Events/Subjective:  - febrile, tachypneic overnight, pan cultured; sedated      REVIEW OF SYSTEMS:  - negative except as above    VITALS:   - reviewed      IMAGING/DATA:   - Reviewed          PHYSICAL EXAM:    General: intubated  CVS: RRR  Pulm: CTAB  GI: Soft, NTND  Extremities: No LE Edema  Neuro: intubated, AOx0, 2mm reactive pupils, ?vertical gaze in tact on command?, uppers extensor, BLE 0/5

## 2024-10-02 NOTE — PROGRESS NOTE ADULT - SUBJECTIVE AND OBJECTIVE BOX
[facility] AD[facility]GR[facility]    Nighttime rounds were performed     Patient is a 45 y/o male with unknown past medical history (reported stroke and MI by coworkers). He presented to [facility] with AMS. Prior to this, pt was working at [employer] when he was found down by coworkers. EMS called and pt brought to [facility] ED. Intubated, sedated, started on cardene for SBPs in 200s. CT head showed pontine bleed. Transferred to NSICU for further management.  (30 Sep 2024 12:55)    On admission, the patient was:  GCS:   Cash-Shane:  modified Robles:  ICH score: 3  NIHSS:    *** HIGH RISK OF DVT PRESENT ON ADMISSION ***      ICU Vital Signs Last 24 Hrs  T(C): 38.8 (02 Oct 2024 18:00), Max: 38.8 (02 Oct 2024 18:00)  T(F): 101.8 (02 Oct 2024 18:00), Max: 101.8 (02 Oct 2024 18:00)  HR: 90 (02 Oct 2024 17:00) (72 - 106)  BP: 155/96 (02 Oct 2024 17:00) (118/74 - 160/100)  BP(mean): 120 (02 Oct 2024 17:00) (89 - 125)  ABP: 165/83 (02 Oct 2024 17:00) (125/65 - 177/92)  ABP(mean): 110 (02 Oct 2024 17:00) (86 - 121)  RR: 20 (02 Oct 2024 16:00) (15 - 20)  SpO2: 99% (02 Oct 2024 17:00) (95% - 100%)      10-01-24 @ 07:01  -  10-02-24 @ 07:00  --------------------------------------------------------  IN: 2968.6 mL / OUT: 2565 mL / NET: 403.6 mL    10-02-24 @ 07:01  -  10-02-24 @ 19:37  --------------------------------------------------------  IN: 654.4 mL / OUT: 1230 mL / NET: -575.6 mL      Mode: AC/ CMV (Assist Control/ Continuous Mandatory Ventilation), RR (machine): 12, TV (machine): 430, FiO2: 40, PEEP: 5, ITime: 1, MAP: 9, PIP: 17      EXAMINATION:  General: Not on sedation  HEENT:  MMM, ETT  Neuro: Patient not on sedation, pupils 3mm and reactive, cough/gag, corneals reflexes present   RUE trace withdraws, LUE extends  RLE withdraws, LLE brisk TF  ? following commands to look down at his toes when asked x 2.  Cards: S1/S2, RRR  Respiratory: Clear, no wheeze  Abdomen: Soft, non- tender  Extremities: No edema  Skin: Warm/dry      MEDICATIONS:   acetaminophen   Oral Liquid .. 650 milliGRAM(s) Oral every 6 hours PRN  acetylcysteine 10%  Inhalation 4 milliLiter(s) Inhalation every 6 hours  albuterol/ipratropium for Nebulization 3 milliLiter(s) Nebulizer every 6 hours  amLODIPine   Tablet 10 milliGRAM(s) Oral daily  cefTRIAXone   IVPB 2000 milliGRAM(s) IV Intermittent every 24 hours  chlorhexidine 0.12% Liquid 15 milliLiter(s) Oral Mucosa every 12 hours  chlorhexidine 2% Cloths 1 Application(s) Topical <User Schedule>  fentaNYL    Injectable 25 MICROGram(s) IV Push every 2 hours PRN  insulin lispro (ADMELOG) corrective regimen sliding scale   SubCutaneous every 6 hours  labetalol 200 milliGRAM(s) Enteral Tube every 8 hours  lactated ringers. 1000 milliLiter(s) (50 mL/Hr) IV Continuous <Continuous>  pantoprazole  Injectable 40 milliGRAM(s) IV Push daily  polyethylene glycol 3350 17 Gram(s) Oral daily  propofol Infusion 10 MICROgram(s)/kG/Min (4.8 mL/Hr) IV Continuous <Continuous>  senna 2 Tablet(s) Oral at bedtime  sodium chloride 3%  Inhalation 4 milliLiter(s) Inhalation every 6 hours     LABS:                      11.6   9.42  )-----------( 201      ( 02 Oct 2024 05:30 )             36.6     10-02    159[H]  |  120[H]  |  30[H]  ----------------------------<  130[H]  3.6   |  29  |  3.15[H]    Ca    7.9[L]      02 Oct 2024 14:59  Phos  3.7     10-02  Mg     2.1     10-02      ABG - ( 02 Oct 2024 07:02 )  pH, Arterial: 7.42  pH, Blood: x     /  pCO2: 43    /  pO2: 138   / HCO3: 28    / Base Excess: 2.9   /  SaO2: 99.1          IMAGING:   CT HEAD: 9/30 09:19  FINDINGS:    Large pontine hemorrhage with subarachnoid extension. Edema within the nabil causing effacement of the fourth ventricle. No hydrocephalus as of   yet. Chronic small vessel ischemic changes in the white matter with old infarcts in the left external capsule, left caudate nucleus, the right   lentiform nucleus. No extra-axial collection or midline shift. No CT evidence of an acute ischemic stroke.      CT head 10/1 15:50  IMPRESSION:  1. Large pontine hemorrhage with subarachnoid extension. Edema causes effacement of the cerebral aqueduct and fourth ventricle without hydrocephalus at this time.    2. Multiple old infarcts as above on a background of chronic small vessel ischemic changes.    As compared to prior CT head same day at 9:15 AM, marginally enlarged pontine hemorrhage with associated edema and mass effect upon the fourth   ventricle and cerebral aqueduct.  Mild posterior frontal lobe subarachnoid hemorrhage. Mild posterior left frontal lobe subarachnoid hemorrhage.        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 CPAP as tolerated  ABG, CXR  VAP bundle    CV:  SBP goal 100-160  Off cardene; on amlodipine and labetalol  Baseline EKG, TTE     RENAL: CKD  Fluids: IVL.  LR at 50cc/hr  Vuong catheter in place; monitor Is/Os  Renal US reviewed. No hydronephrosis. Demonstrates chronic medical renal disease  Na goal 145-155.  Free water flushes    GI:  Diet:   GI prophylaxis [] not indicated [x] PPI [] other:  Bowel regimen [] colace [x] senna [] other: miralax  LBM: Awaiting *    ENDO:   Goal euglycemia (-180)    HEME/ONC:  VTE prophylaxis: [x] SCDs [x] hold chemoprophylaxis due to: acute heme      ID: Febrile  Cultures:  Sputum:   Blood:  CXR:  Procalcitonin:  MRSA nares:   Antibiotics:     Palliative care and Ethics following    Continue care per daytime intensivist Mario Alberto HOUSTON    Critical care time: 45 minutes         [facility]DITIONAL PROGRESS [facility]    Nighttime rounds were performed     Patient is a 43 y/o male with unknown past medical history (reported stroke and MI by coworkers). He presented to [facility] with AMS. Prior to this, pt was working at "[employer] when he was found down by coworkers. EMS called and pt brought to [facility] ED. Intubated, sedated, started on cardene for SBPs in 200s. CT head showed pontine bleed. Transferred to NSICU for further management.  (30 Sep 2024 12:55)    On admission, the patient was:  GCS:   Cash-Shane:  modified Robles:  ICH score: 3  NIHSS:    *** HIGH RISK OF DVT PRESENT ON ADMISSION ***      ICU Vital Signs Last 24 Hrs  T(C): 38.8 (02 Oct 2024 18:00), Max: 38.8 (02 Oct 2024 18:00)  T(F): 101.8 (02 Oct 2024 18:00), Max: 101.8 (02 Oct 2024 18:00)  HR: 90 (02 Oct 2024 17:00) (72 - 106)  BP: 155/96 (02 Oct 2024 17:00) (118/74 - 160/100)  BP(mean): 120 (02 Oct 2024 17:00) (89 - 125)  ABP: 165/83 (02 Oct 2024 17:00) (125/65 - 177/92)  ABP(mean): 110 (02 Oct 2024 17:00) (86 - 121)  RR: 20 (02 Oct 2024 16:00) (15 - 20)  SpO2: 99% (02 Oct 2024 17:00) (95% - 100%)      10-01-24 @ 07:01  -  10-02-24 @ 07:00  --------------------------------------------------------  IN: 2968.6 mL / OUT: 2565 mL / NET: 403.6 mL    10-02-24 @ 07:01  -  10-02-24 @ 19:37  --------------------------------------------------------  IN: 654.4 mL / OUT: 1230 mL / NET: -575.6 mL      Mode: AC/ CMV (Assist Control/ Continuous Mandatory Ventilation), RR (machine): 12, TV (machine): 430, FiO2: 40, PEEP: 5, ITime: 1, MAP: 9, PIP: 17      EXAMINATION:  General: Not on sedation  HEENT:  MMM, ETT  Neuro: Patient not on sedation, pupils 3mm and reactive, cough/gag, corneals reflexes present   RUE trace withdraws, LUE extends  RLE withdraws, LLE brisk TF  ? following commands to look down at his toes when asked 10/1. Not reproducible on this pm exam  Cards: S1/S2, RRR  Respiratory: Clear, no wheeze  Abdomen: Soft, non- tender  Extremities: No edema  Skin: Warm/dry      MEDICATIONS:   acetaminophen   Oral Liquid .. 650 milliGRAM(s) Oral every 6 hours PRN  acetylcysteine 10%  Inhalation 4 milliLiter(s) Inhalation every 6 hours  albuterol/ipratropium for Nebulization 3 milliLiter(s) Nebulizer every 6 hours  amLODIPine   Tablet 10 milliGRAM(s) Oral daily  cefTRIAXone   IVPB 2000 milliGRAM(s) IV Intermittent every 24 hours  chlorhexidine 0.12% Liquid 15 milliLiter(s) Oral Mucosa every 12 hours  chlorhexidine 2% Cloths 1 Application(s) Topical <User Schedule>  fentaNYL    Injectable 25 MICROGram(s) IV Push every 2 hours PRN  insulin lispro (ADMELOG) corrective regimen sliding scale   SubCutaneous every 6 hours  labetalol 200 milliGRAM(s) Enteral Tube every 8 hours  lactated ringers. 1000 milliLiter(s) (50 mL/Hr) IV Continuous <Continuous>  pantoprazole  Injectable 40 milliGRAM(s) IV Push daily  polyethylene glycol 3350 17 Gram(s) Oral daily  senna 2 Tablet(s) Oral at bedtime  sodium chloride 3%  Inhalation 4 milliLiter(s) Inhalation every 6 hours     LABS:                      11.6   9.42  )-----------( 201      ( 02 Oct 2024 05:30 )             36.6     10-02    159[H]  |  120[H]  |  30[H]  ----------------------------<  130[H]  3.6   |  29  |  3.15[H]    Ca    7.9[L]      02 Oct 2024 14:59  Phos  3.7     10-02  Mg     2.1     10-02      ABG - ( 02 Oct 2024 07:02 )  pH, Arterial: 7.42  pH, Blood: x     /  pCO2: 43    /  pO2: 138   / HCO3: 28    / Base Excess: 2.9   /  SaO2: 99.1          IMAGING:   CT HEAD: 9/30 09:19  FINDINGS:    Large pontine hemorrhage with subarachnoid extension. Edema within the nabil causing effacement of the fourth ventricle. No hydrocephalus as of   yet. Chronic small vessel ischemic changes in the white matter with old infarcts in the left external capsule, left caudate nucleus, the right   lentiform nucleus. No extra-axial collection or midline shift. No CT evidence of an acute ischemic stroke.      CT head 10/1 15:50  IMPRESSION:  1. Large pontine hemorrhage with subarachnoid extension. Edema causes effacement of the cerebral aqueduct and fourth ventricle without hydrocephalus at this time.    2. Multiple old infarcts as above on a background of chronic small vessel ischemic changes.    As compared to prior CT head same day at 9:15 AM, marginally enlarged pontine hemorrhage with associated edema and mass effect upon the fourth   ventricle and cerebral aqueduct.  Mild posterior frontal lobe subarachnoid hemorrhage. Mild posterior left frontal lobe subarachnoid hemorrhage.        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 CPAP as tolerated  ABG, CXR  VAP bundle    CV:  SBP goal 100-160  Off cardene; on amlodipine and labetalol  Baseline EKG, TTE     RENAL: CKD  Fluids: IVL.  LR at 50cc/hr  Vuong catheter in place; monitor Is/Os. Recommend DC 10/3  Renal US reviewed. No hydronephrosis. Demonstrates chronic medical renal disease  Na goal 145-155.  Free water flushes    GI:  Diet: TFs (trickle)  GI prophylaxis [] not indicated [x] PPI [] other:  Bowel regimen [] colace [x] senna [] other: miralax  LBM: Awaiting     ENDO:   Goal euglycemia (-180)    HEME/ONC:  VTE prophylaxis: [x] SCDs [x] hold chemoprophylaxis due to: acute heme    ID: Febrile  Cultures:  Sputum: GPC, GNR, GPR  Blood: Pending result  Procalcitonin: pending  MRSA nares: MSSA positive  Antibiotics: S/P vancomycin. Ceftriaxone 2g Q 24    Palliative care and Ethics following    Continue care per daytime intensivist Mario Alberto HOUSTON    Critical care time: 45 minutes

## 2024-10-02 NOTE — PATIENT PROFILE ADULT - LANGUAGE ASSISTANCE NEEDED
N/A due to pt status N/A due to pt status/Yes-Patient/Caregiver accepts free interpretation services...

## 2024-10-02 NOTE — PROGRESS NOTE ADULT - SUBJECTIVE AND OBJECTIVE BOX
HPI:  Unknown age male, unknown past medical history (reported stroke and MI by coworkers) presented to [facility] with AMS, Pt was working at [employer] when he was found down by coworkers. EMS called and pt brought to [facility] ED. Intubated, sedated, started on cardene for SBPs in 200s. CT head showed brain stem bleed. Transferred to NSICU for further management.  (30 Sep 2024 12:55)      Ethics:    Ethics has made contact with Mr. Olivier Reyes, the patient's son. Ethics obtained clarifying information that Mr. Olivier Reyes is an adult, 18 years in age.      Mr. Olivier Reyes gave permission to for Ethics and the healthcare team to utilize Mr. Brigido Ta (the patient's ) as a local point-of-contact to coordinate communication among the various family members.     While, Mr. Olivier Reyes has been identified as a potential surrogate decision-maker, Ethics is continuing to exercise due diligence in identifying, locating, and contacting, Mr. Reyes's reported daughter (as she would be recognized as a surrogate with equal standing with Mr. Olivier Reyes, if she meets the criteria set forth by the [facility] Family Healthcare Decisions Act).     Ethics updated the care team in regards to the above.     Ethics consult remains in progress. Full consult note to follow.      Lilo Harmon, MSN, RN, [facility], [facility]  Ethics Fellow  [phone]  [email or web address]         HPI:  Unknown age male, unknown past medical history (reported stroke and MI by coworkers) presented to [facility] with AMS, Pt was working at [employer] when he was found down by coworkers. EMS called and pt brought to [facility] ED. Intubated, sedated, started on cardene for SBPs in 200s. CT head showed brain stem bleed. Transferred to NSICU for further management.  (30 Sep 2024 12:55)      Ethics:    Ethics has made contact with Mr. Olivier Reyes, the patient's son. Ethics obtained clarifying information that Mr. Olivier Reyes (previously reported as a minor) is indeed an adult 16 years in age.      Mr. Olivier Reyes gave permission to for Ethics and the healthcare team to utilize Mr. Brigido Ta (the patient's ) as a local point-of-contact to coordinate communication among the various family members.     While, Mr. Olivier Reyes has been identified as a potential surrogate decision-maker, Ethics is continuing to exercise due diligence in identifying, locating, and contacting, Mr. Reyes's reported daughter (as she would be recognized as a surrogate with equal standing with Mr. Olivier Reyes, if she meets the criteria set forth by the [facility] Family Healthcare Decisions Act).     Ethics updated the care team in regards to the above.     Ethics consult remains in progress. Full consult note to follow.      Lilo Harmon, MSN, RN, [facility], [facility]  Ethics Fellow  [phone]  [email or web address]         HPI:  Unknown age male, unknown past medical history (reported stroke and MI by coworkers) presented to [facility] with AMS, Pt was working at [email or web address] when he was found down by coworkers. EMS called and pt brought to [facility] ED. Intubated, sedated, started on cardene for SBPs in 200s. CT head showed brain stem bleed. Transferred to NSICU for further management.  (30 Sep 2024 12:55)    Ethics:    Ethics has made contact with Mr. Olivier Reyes, the patient's son. Ethics obtained clarifying information that Mr. Olivier Reyes (previously reported as a minor) is an adult 16 years in age.      Mr. Olivier Reyes gave permission to for Ethics and the healthcare team to utilize Mr. Brigido Ta (the patient's ) as a local point-of-contact to coordinate communication among the various family members.     Mr. Olivier Reyes has been identified as the surrogate decision-maker, but given Mr. Reyes has a reported daughter, Ethics is continuing to exercise due diligence in identifying, locating, and contacting her (as she would be recognized as a surrogate with equal standing with Mr. Olivier Reyes, according to the [facility] Family Healthcare Decisions Act).     Ethics updated the care team in regards to the above.     Ethics consult remains in progress. Full consult note to follow.    Lilo Harmon, MSN, RN, [facility], [facility]  Ethics Fellow  [phone]  [email or web address]

## 2024-10-02 NOTE — PROGRESS NOTE ADULT - SUBJECTIVE AND OBJECTIVE BOX
HPI:  Unknown age male, unknown past medical history (reported stroke and MI by coworkers) presented to [facility] with AMS, Pt was working at [employer] when he was found down by coworkers. EMS called and pt brought to [facility] ED. Intubated, sedated, started on cardene for SBPs in 200s. CT head showed brain stem bleed. Transferred to NSICU for further management.  (30 Sep 2024 12:55)        Ethics:    Ethics continues to explore who would be the appropriate surrogate decision-maker for Mr. Reyes. The following family members have so far been identified:    Patient's Father (Corey Oh)  Patient's Sister (Ania Oh)  Patient's Uncle  (Rizwan Oh)  Patient's Son (Olivier - a minor)    Ethics was made aware of other family members including Mr. Reyes's mother (who is reported as still living) and a daughter reported to be residing in [place]. Ethics is continuing attempts to gain more information to make contact with these family members.     Ethics spoke with Mr. Rizwan Oh this morning who confirmed he is in possession of Mr. Reyes's belongings. Mr. Oh will bring in the patient's cell phone when next visiting the hospital so Ethics may examine for possible contact information for Mr. Reyes's daughter and mother.     Ethics consult remains in progress.

## 2024-10-03 LAB
-  CLINDAMYCIN: SIGNIFICANT CHANGE UP
-  ERYTHROMYCIN: SIGNIFICANT CHANGE UP
-  GENTAMICIN: SIGNIFICANT CHANGE UP
-  OXACILLIN: SIGNIFICANT CHANGE UP
-  RIFAMPIN: SIGNIFICANT CHANGE UP
-  TETRACYCLINE: SIGNIFICANT CHANGE UP
-  TRIMETHOPRIM/SULFAMETHOXAZOLE: SIGNIFICANT CHANGE UP
-  VANCOMYCIN: SIGNIFICANT CHANGE UP
ANION GAP SERPL CALC-SCNC: 8 MMOL/L — SIGNIFICANT CHANGE UP (ref 5–17)
ANION GAP SERPL CALC-SCNC: 9 MMOL/L — SIGNIFICANT CHANGE UP (ref 5–17)
BUN SERPL-MCNC: 32 MG/DL — HIGH (ref 7–23)
BUN SERPL-MCNC: 40 MG/DL — HIGH (ref 7–23)
CALCIUM SERPL-MCNC: 8 MG/DL — LOW (ref 8.4–10.5)
CALCIUM SERPL-MCNC: 8.5 MG/DL — SIGNIFICANT CHANGE UP (ref 8.4–10.5)
CHLORIDE SERPL-SCNC: 122 MMOL/L — HIGH (ref 96–108)
CHLORIDE SERPL-SCNC: 124 MMOL/L — HIGH (ref 96–108)
CO2 SERPL-SCNC: 25 MMOL/L — SIGNIFICANT CHANGE UP (ref 22–31)
CO2 SERPL-SCNC: 25 MMOL/L — SIGNIFICANT CHANGE UP (ref 22–31)
CREAT SERPL-MCNC: 3.19 MG/DL — HIGH (ref 0.5–1.3)
CREAT SERPL-MCNC: 3.94 MG/DL — HIGH (ref 0.5–1.3)
CULTURE RESULTS: ABNORMAL
EGFR: 18 ML/MIN/1.73M2 — LOW
EGFR: 18 ML/MIN/1.73M2 — LOW
EGFR: 23 ML/MIN/1.73M2 — LOW
EGFR: 23 ML/MIN/1.73M2 — LOW
GLUCOSE BLDC GLUCOMTR-MCNC: 120 MG/DL — HIGH (ref 70–99)
GLUCOSE BLDC GLUCOMTR-MCNC: 124 MG/DL — HIGH (ref 70–99)
GLUCOSE BLDC GLUCOMTR-MCNC: 136 MG/DL — HIGH (ref 70–99)
GLUCOSE SERPL-MCNC: 115 MG/DL — HIGH (ref 70–99)
GLUCOSE SERPL-MCNC: 169 MG/DL — HIGH (ref 70–99)
HCT VFR BLD CALC: 39.7 % — SIGNIFICANT CHANGE UP (ref 39–50)
HGB BLD-MCNC: 12.3 G/DL — LOW (ref 13–17)
MAGNESIUM SERPL-MCNC: 2.3 MG/DL — SIGNIFICANT CHANGE UP (ref 1.6–2.6)
MCHC RBC-ENTMCNC: 27.9 PG — SIGNIFICANT CHANGE UP (ref 27–34)
MCHC RBC-ENTMCNC: 31 GM/DL — LOW (ref 32–36)
MCV RBC AUTO: 90 FL — SIGNIFICANT CHANGE UP (ref 80–100)
METHOD TYPE: SIGNIFICANT CHANGE UP
NRBC # BLD: 0 /100 WBCS — SIGNIFICANT CHANGE UP (ref 0–0)
NRBC BLD-RTO: 0 /100 WBCS — SIGNIFICANT CHANGE UP (ref 0–0)
ORGANISM # SPEC MICROSCOPIC CNT: ABNORMAL
ORGANISM # SPEC MICROSCOPIC CNT: SIGNIFICANT CHANGE UP
PHOSPHATE SERPL-MCNC: 3.8 MG/DL — SIGNIFICANT CHANGE UP (ref 2.5–4.5)
PLATELET # BLD AUTO: 194 K/UL — SIGNIFICANT CHANGE UP (ref 150–400)
POTASSIUM SERPL-MCNC: 4.2 MMOL/L — SIGNIFICANT CHANGE UP (ref 3.5–5.3)
POTASSIUM SERPL-MCNC: 4.5 MMOL/L — SIGNIFICANT CHANGE UP (ref 3.5–5.3)
POTASSIUM SERPL-SCNC: 4.2 MMOL/L — SIGNIFICANT CHANGE UP (ref 3.5–5.3)
POTASSIUM SERPL-SCNC: 4.5 MMOL/L — SIGNIFICANT CHANGE UP (ref 3.5–5.3)
PROCALCITONIN SERPL-MCNC: 0.33 NG/ML — HIGH (ref 0.02–0.1)
RBC # BLD: 4.41 M/UL — SIGNIFICANT CHANGE UP (ref 4.2–5.8)
RBC # FLD: 14.2 % — SIGNIFICANT CHANGE UP (ref 10.3–14.5)
SODIUM SERPL-SCNC: 155 MMOL/L — HIGH (ref 135–145)
SODIUM SERPL-SCNC: 158 MMOL/L — HIGH (ref 135–145)
SPECIMEN SOURCE: SIGNIFICANT CHANGE UP
WBC # BLD: 12.78 K/UL — HIGH (ref 3.8–10.5)
WBC # FLD AUTO: 12.78 K/UL — HIGH (ref 3.8–10.5)

## 2024-10-03 PROCEDURE — 99291 CRITICAL CARE FIRST HOUR: CPT

## 2024-10-03 PROCEDURE — 71045 X-RAY EXAM CHEST 1 VIEW: CPT | Mod: 26

## 2024-10-03 PROCEDURE — 70450 CT HEAD/BRAIN W/O DYE: CPT | Mod: 26

## 2024-10-03 PROCEDURE — 99233 SBSQ HOSP IP/OBS HIGH 50: CPT

## 2024-10-03 RX ORDER — ALBUMIN (HUMAN) 12.5 G/50ML
250 INJECTION, SOLUTION INTRAVENOUS ONCE
Refills: 0 | Status: COMPLETED | OUTPATIENT
Start: 2024-10-03 | End: 2024-10-03

## 2024-10-03 RX ORDER — HYPROMELLOSE 0.4 %
1 DROPS OPHTHALMIC (EYE)
Refills: 0 | Status: DISCONTINUED | OUTPATIENT
Start: 2024-10-03 | End: 2025-02-13

## 2024-10-03 RX ORDER — ENOXAPARIN SODIUM 100 MG/ML
40 INJECTION SUBCUTANEOUS EVERY 24 HOURS
Refills: 0 | Status: DISCONTINUED | OUTPATIENT
Start: 2024-10-03 | End: 2024-10-03

## 2024-10-03 RX ORDER — CEFTRIAXONE 500 MG/1
2000 INJECTION, POWDER, FOR SOLUTION INTRAMUSCULAR; INTRAVENOUS EVERY 24 HOURS
Refills: 0 | Status: DISCONTINUED | OUTPATIENT
Start: 2024-10-03 | End: 2024-10-04

## 2024-10-03 RX ORDER — HEPARIN SODIUM 1000 [USP'U]/ML
5000 INJECTION INTRAVENOUS; SUBCUTANEOUS EVERY 8 HOURS
Refills: 0 | Status: DISCONTINUED | OUTPATIENT
Start: 2024-10-03 | End: 2024-10-13

## 2024-10-03 RX ORDER — HYPROMELLOSE 0.4 %
1 DROPS OPHTHALMIC (EYE)
Refills: 0 | Status: DISCONTINUED | OUTPATIENT
Start: 2024-10-03 | End: 2024-10-03

## 2024-10-03 RX ADMIN — Medication 40 MILLIGRAM(S): at 11:24

## 2024-10-03 RX ADMIN — ACETYLCYSTEINE 4 MILLILITER(S): 200 INHALANT RESPIRATORY (INHALATION) at 05:56

## 2024-10-03 RX ADMIN — CEFTRIAXONE 100 MILLIGRAM(S): 500 INJECTION, POWDER, FOR SOLUTION INTRAMUSCULAR; INTRAVENOUS at 10:33

## 2024-10-03 RX ADMIN — Medication 25 MICROGRAM(S): at 20:28

## 2024-10-03 RX ADMIN — Medication 2 TABLET(S): at 06:27

## 2024-10-03 RX ADMIN — Medication 15 MILLILITER(S): at 06:29

## 2024-10-03 RX ADMIN — Medication 25 MICROGRAM(S): at 01:44

## 2024-10-03 RX ADMIN — Medication 25 MICROGRAM(S): at 10:37

## 2024-10-03 RX ADMIN — Medication 15 MILLILITER(S): at 18:22

## 2024-10-03 RX ADMIN — Medication 1 APPLICATION(S): at 18:21

## 2024-10-03 RX ADMIN — ACETYLCYSTEINE 4 MILLILITER(S): 200 INHALANT RESPIRATORY (INHALATION) at 16:51

## 2024-10-03 RX ADMIN — HEPARIN SODIUM 5000 UNIT(S): 1000 INJECTION INTRAVENOUS; SUBCUTANEOUS at 19:23

## 2024-10-03 RX ADMIN — Medication 4 MILLILITER(S): at 10:16

## 2024-10-03 RX ADMIN — Medication 25 MICROGRAM(S): at 11:07

## 2024-10-03 RX ADMIN — LABETALOL HYDROCHLORIDE 200 MILLIGRAM(S): 200 TABLET, FILM COATED ORAL at 06:27

## 2024-10-03 RX ADMIN — Medication 25 MICROGRAM(S): at 04:15

## 2024-10-03 RX ADMIN — Medication 4 MILLILITER(S): at 05:56

## 2024-10-03 RX ADMIN — LABETALOL HYDROCHLORIDE 200 MILLIGRAM(S): 200 TABLET, FILM COATED ORAL at 13:17

## 2024-10-03 RX ADMIN — Medication 1 APPLICATION(S): at 06:28

## 2024-10-03 RX ADMIN — IPRATROPIUM BROMIDE AND ALBUTEROL SULFATE 3 MILLILITER(S): .5; 2.5 SOLUTION RESPIRATORY (INHALATION) at 10:15

## 2024-10-03 RX ADMIN — Medication 4 MILLILITER(S): at 16:51

## 2024-10-03 RX ADMIN — AMLODIPINE BESYLATE 10 MILLIGRAM(S): 10 TABLET ORAL at 06:27

## 2024-10-03 RX ADMIN — IPRATROPIUM BROMIDE AND ALBUTEROL SULFATE 3 MILLILITER(S): .5; 2.5 SOLUTION RESPIRATORY (INHALATION) at 16:51

## 2024-10-03 RX ADMIN — LABETALOL HYDROCHLORIDE 200 MILLIGRAM(S): 200 TABLET, FILM COATED ORAL at 22:19

## 2024-10-03 RX ADMIN — Medication 1 APPLICATION(S): at 12:49

## 2024-10-03 RX ADMIN — Medication 25 MICROGRAM(S): at 03:38

## 2024-10-03 RX ADMIN — ACETYLCYSTEINE 4 MILLILITER(S): 200 INHALANT RESPIRATORY (INHALATION) at 10:16

## 2024-10-03 RX ADMIN — Medication 25 MICROGRAM(S): at 01:14

## 2024-10-03 RX ADMIN — IPRATROPIUM BROMIDE AND ALBUTEROL SULFATE 3 MILLILITER(S): .5; 2.5 SOLUTION RESPIRATORY (INHALATION) at 05:55

## 2024-10-03 NOTE — PROGRESS NOTE ADULT - SUBJECTIVE AND OBJECTIVE BOX
SUBJECTIVE AND OBJECTIVE:  Indication for Geriatrics and Palliative Care Services: Complex medical decision  making    OVERNIGHT EVENTS: Continues to have brainstem reflexes on exam but unresponsive to verbal or tactile stimuli despite being off all sedation. Ethics determined that poonam Aguayo is surrogate decision maker.    Allergies    Allergy Status Unknown    Intolerances    MEDICATIONS  (STANDING):  acetylcysteine 10%  Inhalation 4 milliLiter(s) Inhalation every 6 hours  albuterol/ipratropium for Nebulization 3 milliLiter(s) Nebulizer every 6 hours  amLODIPine   Tablet 10 milliGRAM(s) Oral daily  cefTRIAXone   IVPB 2000 milliGRAM(s) IV Intermittent every 24 hours  chlorhexidine 0.12% Liquid 15 milliLiter(s) Oral Mucosa every 12 hours  chlorhexidine 2% Cloths 1 Application(s) Topical <User Schedule>  insulin lispro (ADMELOG) corrective regimen sliding scale   SubCutaneous every 6 hours  labetalol 200 milliGRAM(s) Enteral Tube every 8 hours  lactated ringers. 1000 milliLiter(s) (50 mL/Hr) IV Continuous <Continuous>  pantoprazole  Injectable 40 milliGRAM(s) IV Push daily  petrolatum Ophthalmic Ointment 1 Application(s) Both EYES two times a day  sodium chloride 3%  Inhalation 4 milliLiter(s) Inhalation every 6 hours    MEDICATIONS  (PRN):  acetaminophen   Oral Liquid .. 650 milliGRAM(s) Oral every 6 hours PRN Temp greater or equal to 38C (100.4F), Mild Pain (1 - 3)  fentaNYL    Injectable 25 MICROGram(s) IV Push every 2 hours PRN agitation, severe pain, vent desynchrony      ITEMS UNCHECKED ARE NOT PRESENT    PRESENT SYMPTOMS: [X]Unable to self-report  Source if other than patient:  [ ]Family   [X]Team     Pain:  [ ]yes [X]no  QOL impact -   Location -   Aggravating factors -  Quality -   Radiation -   Timing -   Severity (0-10 scale):   Minimal acceptable level (0-10 scale):     Dyspnea:                           [ ]Mild [ ]Moderate [ ]Severe  Anxiety:                             [ ]Mild [ ]Moderate [ ]Severe  Fatigue:                             [ ]Mild [ ]Moderate [ ]Severe  Nausea:                             [ ]Mild [ ]Moderate [ ]Severe  Loss of appetite:              [ ]Mild [ ]Moderate [ ]Severe  Constipation:                    [ ]Mild [ ]Moderate [ ]Severe    Other Symptoms:  [X]All other review of systems negative    Palliative Performance Status Version 2:        10 %      http://npcrc.org/files/news/palliative_performance_scale_ppsv2.pdf    PHYSICAL EXAM:  Vital Signs Last 24 Hrs  T(C): 37.4 (03 Oct 2024 13:00), Max: 38.8 (02 Oct 2024 18:00)  T(F): 99.3 (03 Oct 2024 13:00), Max: 101.8 (02 Oct 2024 18:00)  HR: 79 (03 Oct 2024 13:00) (56 - 95)  BP: 164/108 (03 Oct 2024 13:00) (136/86 - 173/108)  BP(mean): 131 (03 Oct 2024 13:00) (106 - 135)  RR: 16 (03 Oct 2024 12:10) (15 - 20)  SpO2: 97% (03 Oct 2024 13:00) (96% - 100%)    Parameters below as of 03 Oct 2024 13:00  Patient On (Oxygen Delivery Method): ventilator    O2 Concentration (%): 40 I&O's Summary    02 Oct 2024 07:01  -  03 Oct 2024 07:00  --------------------------------------------------------  IN: 1934.4 mL / OUT: 2120 mL / NET: -185.6 mL    03 Oct 2024 07:01  -  03 Oct 2024 14:08  --------------------------------------------------------  IN: 830 mL / OUT: 100 mL / NET: 730 mL       GENERAL: [ ]Cachexia    [ ]Alert  [ ]Oriented x   [ ]Lethargic  [X]Unarousable  [ ]Verbal  [ ]Non-Verbal  Behavioral:   [ ]Anxiety  [ ]Delirium [ ]Agitation [X]Unresponsive  HEENT:  [ ]Normal   [ ]Dry mouth   [X]ET Tube  [ ]Oral lesions  PULMONARY:   [X]Clear [ ]Tachypnea  [ ]Audible excessive secretions   [ ]Rhonchi        [ ]Right [ ]Left [ ]Bilateral  [ ]Crackles        [ ]Right [ ]Left [ ]Bilateral  [ ]Wheezing     [ ]Right [ ]Left [ ]Bilateral  [ ]Diminished BS [ ] Right [ ]Left [ ]Bilateral  CARDIOVASCULAR:    [X]Regular [ ]Irregular [ ]Tachy  [ ]Alexi [ ]Murmur [ ]Other  GASTROINTESTINAL:  [X]Soft  [ ]Distended   [ ]+BS  [ ]Non tender [ ]Tender  [ ]Other [ ]PEG [X]OGT/ NGT   Last BM:   GENITOURINARY:  [ ]Normal [ ]Incontinent   [ ]Oliguria/Anuria   [X]Vuong  MUSCULOSKELETAL:   [ ]Normal   [ ]Weakness  [X]Bed/Wheelchair bound [ ]Edema  NEUROLOGIC:   [ ]No focal deficits  [X] Cognitive impairment  [ ] Dysphagia [ ]Dysarthria [ ] Paresis [ ]Other   SKIN:   [X]Normal  [ ]Rash  [ ]Other  [ ]Pressure ulcer(s) [ ]y [ ]n present on admission    CRITICAL CARE:  [ ]Shock Present  [ ]Septic [ ]Cardiogenic [ ]Neurologic [ ]Hypovolemic  [ ]Vasopressors [ ]Inotropes  X ]Respiratory failure present [X]Mechanical Ventilation [ ]Non-invasive ventilatory support [ ]High-Flow Mode: AC/ CMV (Assist Control/ Continuous Mandatory Ventilation), RR (machine): 12, TV (machine): 430, FiO2: 50, PEEP: 6, PS: 6, ITime: 1, MAP: 9, PIP: 18  [ ]Acute  [ ]Chronic [ ]Hypoxic  [ ]Hypercarbic [ ]Other  [ ]Other organ failure     LABS:                        12.3   12.78 )-----------( 194      ( 03 Oct 2024 02:23 )             39.7   10-03    158[H]  |  124[H]  |  32[H]  ----------------------------<  115[H]  4.5   |  25  |  3.19[H]    Ca    8.0[L]      03 Oct 2024 02:23  Phos  3.8     10-03  Mg     2.3     10-03        Urinalysis Basic - ( 03 Oct 2024 02:23 )    Color: x / Appearance: x / SG: x / pH: x  Gluc: 115 mg/dL / Ketone: x  / Bili: x / Urobili: x   Blood: x / Protein: x / Nitrite: x   Leuk Esterase: x / RBC: x / WBC x   Sq Epi: x / Non Sq Epi: x / Bacteria: x      RADIOLOGY & ADDITIONAL STUDIES: < from: CT Head No Cont (10.03.24 @ 11:06) >  CLINICAL INDICATION: stability scan    PORTABLE CTH  TECHNIQUE: CT images of the head were obtained without contrast. Portable   technique was used, with limited technical quality. Dose reduction   techniques were utilized including but not limited to automated exposure   control (AEC), iterative reconstruction technique, and/or mA and/or kV   dose adjustments based on patient size.  COMPARISON: Head CT 9/30/2024.    FINDINGS:    Again noted is a parenchymal hematoma in the nabil, with associated   expansion and surrounding vasogenic edema. There is mild spillage into   the subarachnoid spaces at the cistern and within sulci in the right   frontal lobe. All of these findings appear stable, without interval   rebleeding.    There is a chronic hemorrhagic infarct in the left external   capsule/corona radiata. There is a small chronic white matter infarct vs   small vessel disease in the right corona radiata.    Moderate generalized cerebral volume loss, with distention of the sulci   and concomitant ex-vacuo ventricular dilatation. Mild nonspecific low   attenuation in the periventricular and subcortical white matter.    No midline shift or herniation. No CT evidence of acute territorial   infarction, although MRI with DWI would be more sensitive.    Limited views of the sinuses and mastoids show mild mucosal thickening   without air-fluid levels, likely chronic. An ETT is noted in place.    Limited views of the orbits and visualized soft tissues of the neck,   face, scalp, skull base, and calvarium are otherwise unremarkable.    IMPRESSION:    1.  No significant change, without intervalrebleeding.      < end of copied text >      Protein Calorie Malnutrition Present: [ ]mild [ ]moderate [ ]severe [ ]underweight [ ]morbid obesity  https://www.andeal.org/vault/2440/web/files/ONC/Table_Clinical%20Characteristics%20to%20Document%20Malnutrition-White%20JV%20et%20al%202012.pdf    Height (cm): 172.7 (09-30-24 @ 08:39)  Weight (kg): 80 (09-30-24 @ 09:04)  BMI (kg/m2): 26.8 (09-30-24 @ 09:04)    [X]PPSV2 < or = 30%  [ ]significant weight loss [ ]poor nutritional intake [ ]anasarca[X]Artificial Nutrition    REFERRALS:   [ ]Chaplaincy  [ ]Hospice  [ ]Child Life  [X]Social Work [ ]Patient and Family Support [X]Case management [ ]Holistic Therapy [ ] Music therapy  [ ] Massage Therapy    DISCUSSION OF CASE: Family - obtained additional history and to provide emotional support;  Primary team - discussed plan of care

## 2024-10-03 NOTE — PROGRESS NOTE ADULT - ASSESSMENT
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 Intubated for airway protection and admitted to NSICU. Palliative consulted fro complex medical decision making and support.

## 2024-10-03 NOTE — PROGRESS NOTE ADULT - SUBJECTIVE AND OBJECTIVE BOX
NSCU Progress Note    Assessment/Hospital Course:        24 Hour Events/Subjective:  - exam unchanged, ongoing goc      REVIEW OF SYSTEMS:  - negative except as above    VITALS:   - reviewed      IMAGING/DATA:   - Reviewed          PHYSICAL EXAM:    General: intubated  CVS: RRR  Pulm: CTAB  GI: Soft, NTND  Extremities: No LE Edema  Neuro: intubated, AOx0, 2mm reactive pupils, ?vertical gaze in tact on command?, uppers extensor, BLE 0/5

## 2024-10-03 NOTE — PROGRESS NOTE ADULT - ASSESSMENT
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 CPAP as tolerated  ABG, CXR  VAP bundle  ABG    CV:  SBP goal 100-160  labetalol 200mg tid  amlodipine 10  Katt      RENAL:   ANIKA though suspect CKD  Fluids: LR at 80cc/hr  Vuong catheter in place; monitor Is/Os  Na goal 145-155  BMPq12    GI:  Diet: TF  GI prophylaxis [] not indicated [x] PPI [] other:  Bowel regimen [] colace [x] senna [] other: miralax  Monitor LFTs    ENDO:   Goal euglycemia (-180)      HEME/ONC:  VTE prophylaxis: [x] SCDs [x] chemoprophylaxis tonight      ID:   Mild leukocytosis  f/u pan culture  CTX (10/2 - ) x7d  MRSA swab neg

## 2024-10-03 NOTE — PROGRESS NOTE ADULT - SUBJECTIVE AND OBJECTIVE BOX
S/Overnight events:  Intubated, subjective data unable to be obtained due to pt's neurologic status. GEORGE overnight      Hospital Course:   9/30: transferred from [facility]. A line placed. Versed dc'd. Cy Rader at bedside, states pt has family in [place], cannot confirm medications or PMH other than stroke and MI. 250cc bolus 3% given. LR switched to NS. hydralazine 25q8 started, 3% started, switched propofol to precedex   10/1: stability CTH done. Added labetalol, started TF. Palliative consulted. ethics consulted to determine surrogate. febrile 103, pan cx sent  10/2: BD 2, GEORGE overnight. TF resumed. Desatt'd to 80s, FiO2 inc. to 50. Fentanyl given, ABG, CXR ordered. Maxxed on precedex, started on propofol for DARIEN -4 - -5. Precedex dc'd. Duonebs, mucomyst, hypertonic added. 3% dc'd. Cardene dc'd. Start vanc/CTX. Increased labetalol 200q8. MRSA negative, dc'd vanc. ETT pulled back 2cm x 2, good positioning after confirmatory chest xray. Ethics attempting to establish HCP with family. Na 159, starting FW 250q6 for range 150-155.   10/3: BD3, GEORGE      Vital Signs Last 24 Hrs  T(C): 36.6 (02 Oct 2024 22:03), Max: 38.8 (02 Oct 2024 18:00)  T(F): 97.9 (02 Oct 2024 22:03), Max: 101.8 (02 Oct 2024 18:00)  HR: 56 (02 Oct 2024 22:03) (56 - 100)  BP: 143/91 (02 Oct 2024 22:03) (122/81 - 160/100)  BP(mean): 113 (02 Oct 2024 22:03) (98 - 125)  RR: 16 (02 Oct 2024 22:03) (15 - 20)  SpO2: 100% (02 Oct 2024 22:03) (95% - 100%)    Parameters below as of 02 Oct 2024 22:03  Patient On (Oxygen Delivery Method): ventilator  O2 Flow (L/min): 40      I&O's Detail    01 Oct 2024 07:01  -  02 Oct 2024 07:00  --------------------------------------------------------  IN:    Dexmedetomidine: 16 mL    Free Water: 300 mL    Jevity 1.2: 190 mL    Lactated Ringers: 90 mL    Lactated Ringers: 900 mL    NiCARdipine: 625 mL    Propofol: 57.6 mL    sodium chloride 3% + sodium acetate 50:50: 790 mL  Total IN: 2968.6 mL    OUT:    Indwelling Catheter - Urethral (mL): 2565 mL  Total OUT: 2565 mL    Total NET: 403.6 mL      02 Oct 2024 07:01  -  03 Oct 2024 00:02  --------------------------------------------------------  IN:    Free Water: 250 mL    IV PiggyBack: 300 mL    Jevity 1.2: 80 mL    Lactated Ringers: 850 mL    Propofol: 14.4 mL    sodium chloride 3% + sodium acetate 50:50: 90 mL  Total IN: 1584.4 mL    OUT:    Indwelling Catheter - Urethral (mL): 1620 mL    Voided (mL): 300 mL  Total OUT: 1920 mL    Total NET: -335.6 mL        I&O's Summary    01 Oct 2024 07:01  -  02 Oct 2024 07:00  --------------------------------------------------------  IN: 2968.6 mL / OUT: 2565 mL / NET: 403.6 mL    02 Oct 2024 07:01  -  03 Oct 2024 00:02  --------------------------------------------------------  IN: 1584.4 mL / OUT: 1920 mL / NET: -335.6 mL        PHYSICAL EXAM:  Constitutional: Intubated, OG tube in place  Respiratory: breathing non-labored, symmetrical chest wall movement, RLL rhonchorous  Gastrointestinal: abdomen soft  Neurological:   AO0, not following commands, +cough/gag/corneals  RUE: withdraws to noxious  LUE: extends to noxious  RLE: withdraws to noxious  LLE: trifle flexion to noxious    TUBES/LINES:  [] CVC  [x] A-line  [] Lumbar Drain  [] Ventriculostomy  [] Other    DIET: TF via OGT  [] NPO  [] Mechanical  [] Tube feeds    LABS:                        11.6   9.42  )-----------( 201      ( 02 Oct 2024 05:30 )             36.6     10-02    159[H]  |  120[H]  |  30[H]  ----------------------------<  130[H]  3.6   |  29  |  3.15[H]    Ca    7.9[L]      02 Oct 2024 14:59  Phos  3.7     10-02  Mg     2.1     10-02        Urinalysis Basic - ( 02 Oct 2024 14:59 )    Color: x / Appearance: x / SG: x / pH: x  Gluc: 130 mg/dL / Ketone: x  / Bili: x / Urobili: x   Blood: x / Protein: x / Nitrite: x   Leuk Esterase: x / RBC: x / WBC x   Sq Epi: x / Non Sq Epi: x / Bacteria: x          CAPILLARY BLOOD GLUCOSE      POCT Blood Glucose.: 133 mg/dL (02 Oct 2024 23:21)  POCT Blood Glucose.: 109 mg/dL (02 Oct 2024 17:11)  POCT Blood Glucose.: 104 mg/dL (02 Oct 2024 11:04)      Drug Levels: [] N/A    CSF Analysis: [] N/A      Allergies    Allergy Status Unknown    Intolerances      MEDICATIONS:  Antibiotics:  cefTRIAXone   IVPB 2000 milliGRAM(s) IV Intermittent every 24 hours    Neuro:  acetaminophen   Oral Liquid .. 650 milliGRAM(s) Oral every 6 hours PRN  fentaNYL    Injectable 25 MICROGram(s) IV Push every 2 hours PRN    Anticoagulation:    OTHER:  acetylcysteine 10%  Inhalation 4 milliLiter(s) Inhalation every 6 hours  albuterol/ipratropium for Nebulization 3 milliLiter(s) Nebulizer every 6 hours  amLODIPine   Tablet 10 milliGRAM(s) Oral daily  chlorhexidine 0.12% Liquid 15 milliLiter(s) Oral Mucosa every 12 hours  chlorhexidine 2% Cloths 1 Application(s) Topical <User Schedule>  insulin lispro (ADMELOG) corrective regimen sliding scale   SubCutaneous every 6 hours  labetalol 200 milliGRAM(s) Enteral Tube every 8 hours  pantoprazole  Injectable 40 milliGRAM(s) IV Push daily  polyethylene glycol 3350 17 Gram(s) Oral daily  senna 2 Tablet(s) Oral two times a day  sodium chloride 3%  Inhalation 4 milliLiter(s) Inhalation every 6 hours    IVF:  lactated ringers. 1000 milliLiter(s) IV Continuous <Continuous>    CULTURES:    RADIOLOGY & ADDITIONAL TESTS:      ASSESSMENT:  43 y/o PMH ?stroke/MI present to [facility] after collapsing at work. Decorticate posturing, vomiting, intubated for airway protection. 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 Transferred to [facility] for further management    Neuro:  - neuro/vitals q1  - pain control: fentanyl 25q2 prn, tylenol  - [facility] 9/30: enlarged pontine hemorrhage, repeat done: pre-fung read stable   - stroke core measures, stroke neuro following  - palliative and ethics following, attempting to establish HCP    CV:  - SBP<160  - HTN: norvasc 10mg, labetalol 200q8  - echo (9/30) EF 75%    Resp:  - Intubated, FVS  - duonebs/mucomyst/3% for secretions     GI:  - TF via OGT  - bowel reg  - protonix while intubated  - Hypernatremia:  q6hrs    Renal:  - LR@50cc/hr  - Na 150-155  - +gabriel  - trend BUN/Cr: possible CKD?  - renal US 10/1: echogenicity c/w chronic med renal dz    Endo:  - ISS  - a1c 5.4    Heme:  - DVT ppx: SCDs    ID:  - pan cx 10/1  - MRSA swab negative, MSSA +, s/p 1 dose vanc (10/2), continuing CTX    Dispo: NSICU, full code    D/w Dr. Manzo, Dr. Reveles

## 2024-10-03 NOTE — PROGRESS NOTE ADULT - NS ATTEST RISK PROBLEM GEN_ALL_CORE FT
Acute Illness That Poses A Threat To Life  Abrupt Change In Neurological Status  Chronic Illness With Severe Exacerbation/Progression

## 2024-10-03 NOTE — PROGRESS NOTE ADULT - SUBJECTIVE AND OBJECTIVE BOX
[facility] ATTENDING -- ADDITIONAL PROGRESS NOTE    Nighttime rounds were performed     Patient is a 45 y/o male with unknown past medical history (reported stroke and MI by coworkers). He presented to [facility] with AMS. Prior to this, pt was working at [employer] when he was found down by coworkers. EMS called and pt brought to [facility] ED. Intubated, sedated, started on cardene for SBPs in 200s. CT head showed pontine bleed. Transferred to NSICU for further management.  (30 Sep 2024 12:55)    On admission, the patient was:  GCS:   Cash-Shane:  modified Robles:  ICH score: 3  NIHSS:    *** HIGH RISK OF DVT PRESENT ON ADMISSION ***      ICU Vital Signs Last 24 Hrs  T(C): 37.6 (03 Oct 2024 18:10), Max: 37.6 (03 Oct 2024 18:10)  T(F): 99.7 (03 Oct 2024 18:10), Max: 99.7 (03 Oct 2024 18:10)  HR: 78 (03 Oct 2024 18:00) (56 - 90)  BP: 147/98 (03 Oct 2024 18:00) (133/92 - 173/108)  BP(mean): 117 (03 Oct 2024 18:00) (106 - 135)  ABP: 137/128 (03 Oct 2024 18:00) (113/96 - 204/115)  ABP(mean): 131 (03 Oct 2024 18:00) (101 - 153)  RR: 17 (03 Oct 2024 18:00) (15 - 19)  SpO2: 98% (03 Oct 2024 18:00) (96% - 100%)      10-02-24 @ 07:01  -  10-03-24 @ 07:00  --------------------------------------------------------  IN: 1934.4 mL / OUT: 2120 mL / NET: -185.6 mL    10-03-24 @ 07:01  -  10-03-24 @ 18:42  --------------------------------------------------------  IN: 1670 mL / OUT: 625 mL / NET: 1045 mL      Mode: AC/ CMV (Assist Control/ Continuous Mandatory Ventilation), RR (machine): 12, TV (machine): 430, FiO2: 50, PEEP: 6, PS: 6, ITime: 1, MAP: 8, PIP: 12      EXAMINATION:  General: Not on sedation  HEENT:  MMM, ETT  Neuro: Patient not on sedation, pupils 3mm and reactive, cough/gag, corneals reflexes present   RUE trace withdraws, LUE extends  RLE withdraws, LLE brisk TF  ? following commands to look down at his toes when asked 10/1. Not often reproducible  Cards: S1/S2, RRR  Respiratory: Clear, no wheeze  Abdomen: Soft, non- tender  Extremities: No edema  Skin: Warm/dry      MEDICATIONS:  acetaminophen   Oral Liquid .. 650 milliGRAM(s) Oral every 6 hours PRN  acetylcysteine 10%  Inhalation 4 milliLiter(s) Inhalation every 6 hours  albuterol/ipratropium for Nebulization 3 milliLiter(s) Nebulizer every 6 hours  amLODIPine   Tablet 10 milliGRAM(s) Oral daily  cefTRIAXone   IVPB 2000 milliGRAM(s) IV Intermittent every 24 hours  chlorhexidine 0.12% Liquid 15 milliLiter(s) Oral Mucosa every 12 hours  chlorhexidine 2% Cloths 1 Application(s) Topical <User Schedule>  fentaNYL    Injectable 25 MICROGram(s) IV Push every 2 hours PRN  insulin lispro (ADMELOG) corrective regimen sliding scale   SubCutaneous every 6 hours  labetalol 200 milliGRAM(s) Enteral Tube every 8 hours  lactated ringers. 1000 milliLiter(s) (50 mL/Hr) IV Continuous <Continuous>  pantoprazole  Injectable 40 milliGRAM(s) IV Push daily  petrolatum Ophthalmic Ointment 1 Application(s) Both EYES two times a day  sodium chloride 3%  Inhalation 4 milliLiter(s) Inhalation every 6 hours    LABS:                         12.3   12.78 )-----------( 194      ( 03 Oct 2024 02:23 )             39.7     10-03    155[H]  |  122[H]  |  40[H]  ----------------------------<  169[H]  4.2   |  25  |  3.94[H]    Ca    8.5      03 Oct 2024 16:47  Phos  3.8     10-03  Mg     2.3     10-03        ABG - ( 02 Oct 2024 07:02 )  pH, Arterial: 7.42  pH, Blood: x     /  pCO2: 43    /  pO2: 138   / HCO3: 28    / Base Excess: 2.9   /  SaO2: 99.1          IMAGING:   CT HEAD: 9/30 09:19  FINDINGS:    Large pontine hemorrhage with subarachnoid extension. Edema within the nabil causing effacement of the fourth ventricle. No hydrocephalus as of   yet. Chronic small vessel ischemic changes in the white matter with old infarcts in the left external capsule, left caudate nucleus, the right   lentiform nucleus. No extra-axial collection or midline shift. No CT evidence of an acute ischemic stroke.      CT head 10/1 15:50  IMPRESSION:  1. Large pontine hemorrhage with subarachnoid extension. Edema causes effacement of the cerebral aqueduct and fourth ventricle without hydrocephalus at this time.    2. Multiple old infarcts as above on a background of chronic small vessel ischemic changes.    As compared to prior CT head same day at 9:15 AM, marginally enlarged pontine hemorrhage with associated edema and mass effect upon the fourth   ventricle and cerebral aqueduct.  Mild posterior frontal lobe subarachnoid hemorrhage. Mild posterior left frontal lobe subarachnoid hemorrhage.      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 CPAP as tolerated  ABG, CXR  VAP bundle    CV:  SBP goal 100-160  Off cardene; on amlodipine and labetalol  Baseline EKG, TTE     RENAL: CKD  Fluids: IVL.  LR at 50cc/hr  Vuong catheter in place; monitor Is/Os. Recommend DC 10/3**  Renal US reviewed. No hydronephrosis. Demonstrates chronic medical renal disease  Na goal 145-155.  Free water flushes    GI:  Diet: TFs (trickle)  GI prophylaxis [] not indicated [x] PPI [] other:  Bowel regimen [] colace [x] senna [] other: miralax  LBM: Awaiting     ENDO:   Goal euglycemia (-180)    HEME/ONC:  VTE prophylaxis: [x] SCDs [x] Start SQL    ID: Febrile  Cultures:  Sputum: GPC, GNR, GPR  Blood: Pending result  Procalcitonin: pending  MRSA nares: MSSA positive  Antibiotics: S/P vancomycin. Ceftriaxone 2g Q 24    Palliative care and Ethics following    Continue care per daytime intensivist Mario Alberto HOUSTON    Critical care time: 45 minutes         [facility] ADDITIONAL PROGRESS [facility]    Nighttime rounds were performed     Patient is a 43 y/o male with unknown past medical history (reported stroke and MI by coworkers). He presented to [facility] with AMS. Prior to this, pt was working at [employer] when he was found down by coworkers. EMS called and pt brought to [facility] ED. Intubated, sedated, started on cardene for SBPs in 200s. CT head showed pontine bleed. Transferred to NSICU for further management.  (30 Sep 2024 12:55)    On admission, the patient was:  GCS:   Cash-Shane:  modified Robles:  ICH score: 3  NIHSS:    *** HIGH RISK OF DVT PRESENT ON ADMISSION ***      ICU Vital Signs Last 24 Hrs  T(C): 37.6 (03 Oct 2024 18:10), Max: 37.6 (03 Oct 2024 18:10)  T(F): 99.7 (03 Oct 2024 18:10), Max: 99.7 (03 Oct 2024 18:10)  HR: 78 (03 Oct 2024 18:00) (56 - 90)  BP: 147/98 (03 Oct 2024 18:00) (133/92 - 173/108)  BP(mean): 117 (03 Oct 2024 18:00) (106 - 135)  ABP: 137/128 (03 Oct 2024 18:00) (113/96 - 204/115)  ABP(mean): 131 (03 Oct 2024 18:00) (101 - 153)  RR: 17 (03 Oct 2024 18:00) (15 - 19)  SpO2: 98% (03 Oct 2024 18:00) (96% - 100%)      10-02-24 @ 07:01  -  10-03-24 @ 07:00  --------------------------------------------------------  IN: 1934.4 mL / OUT: 2120 mL / NET: -185.6 mL    10-03-24 @ 07:01  -  10-03-24 @ 18:42  --------------------------------------------------------  IN: 1670 mL / OUT: 625 mL / NET: 1045 mL      Mode: AC/ CMV (Assist Control/ Continuous Mandatory Ventilation), RR (machine): 12, TV (machine): 430, FiO2: 50, PEEP: 6, PS: 6, ITime: 1, MAP: 8, PIP: 12      EXAMINATION:  General: Not on sedation  HEENT:  MMM, ETT  Neuro: Patient not on sedation, pupils 3mm and reactive, cough/gag, corneals reflexes present on L, not on right, following commands only to look down  RUE trace withdraws, LUE extends  RLE brisk TF, LLE  TF  ? following commands to look down at his toes when asked 10/3. Not often reproducible  Cards: S1/S2, RRR  Respiratory: Clear, no wheeze  Abdomen: Soft, non- tender  Extremities: No edema  Skin: Warm/dry      MEDICATIONS:  acetaminophen   Oral Liquid .. 650 milliGRAM(s) Oral every 6 hours PRN  acetylcysteine 10%  Inhalation 4 milliLiter(s) Inhalation every 6 hours  albuterol/ipratropium for Nebulization 3 milliLiter(s) Nebulizer every 6 hours  amLODIPine   Tablet 10 milliGRAM(s) Oral daily  cefTRIAXone   IVPB 2000 milliGRAM(s) IV Intermittent every 24 hours  chlorhexidine 0.12% Liquid 15 milliLiter(s) Oral Mucosa every 12 hours  chlorhexidine 2% Cloths 1 Application(s) Topical <User Schedule>  fentaNYL    Injectable 25 MICROGram(s) IV Push every 2 hours PRN  insulin lispro (ADMELOG) corrective regimen sliding scale   SubCutaneous every 6 hours  labetalol 200 milliGRAM(s) Enteral Tube every 8 hours  lactated ringers. 1000 milliLiter(s) (50 mL/Hr) IV Continuous <Continuous>  pantoprazole  Injectable 40 milliGRAM(s) IV Push daily  petrolatum Ophthalmic Ointment 1 Application(s) Both EYES two times a day  sodium chloride 3%  Inhalation 4 milliLiter(s) Inhalation every 6 hours    LABS:                         12.3   12.78 )-----------( 194      ( 03 Oct 2024 02:23 )             39.7     10-03    155[H]  |  122[H]  |  40[H]  ----------------------------<  169[H]  4.2   |  25  |  3.94[H]    Ca    8.5      03 Oct 2024 16:47  Phos  3.8     10-03  Mg     2.3     10-03        ABG - ( 02 Oct 2024 07:02 )  pH, Arterial: 7.42  pH, Blood: x     /  pCO2: 43    /  pO2: 138   / HCO3: 28    / Base Excess: 2.9   /  SaO2: 99.1          IMAGING:   CT HEAD: 9/30 09:19  FINDINGS:    Large pontine hemorrhage with subarachnoid extension. Edema within the nabil causing effacement of the fourth ventricle. No hydrocephalus as of   yet. Chronic small vessel ischemic changes in the white matter with old infarcts in the left external capsule, left caudate nucleus, the right   lentiform nucleus. No extra-axial collection or midline shift. No CT evidence of an acute ischemic stroke.      CT head 10/1 15:50  IMPRESSION:  1. Large pontine hemorrhage with subarachnoid extension. Edema causes effacement of the cerebral aqueduct and fourth ventricle without hydrocephalus at this time.    2. Multiple old infarcts as above on a background of chronic small vessel ischemic changes.    As compared to prior CT head same day at 9:15 AM, marginally enlarged pontine hemorrhage with associated edema and mass effect upon the fourth   ventricle and cerebral aqueduct.  Mild posterior frontal lobe subarachnoid hemorrhage. Mild posterior left frontal lobe subarachnoid hemorrhage.      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 Monitor.     PULM: Intubated  Full vent support. CPAP as tolerated  ABG, CXR  VAP bundle    CV:  SBP goal 100-160  Off cardene; on amlodipine and labetalol  Baseline EKG, TTE     RENAL: CKD (mild worsening noted 10/3)  Fluids: LR at 80cc/hr  Renal US reviewed. No hydronephrosis. Demonstrates chronic medical renal disease  Na goal 145-155.  Free water flushes  Monitor Is/Os, Cr. If worsening consider renal input    GI:  Diet: TFs   GI prophylaxis [] not indicated [x] PPI [] other:  Bowel regimen [] colace [x] senna [] other: miralax  LBM: 10/3; diarrhea    ENDO:   Goal euglycemia (-180)    HEME/ONC:  VTE prophylaxis: [x] SCDs [x] SDH    ID: Febrile, MSSA pneumonia  Cultures:  Sputum: Staph aureus  Blood cultures NGTD  Procalcitonin:   MRSA nares: MSSA positive  Antibiotics: S/P vancomycin. Ceftriaxone 2g Q 24    Palliative care and Ethics following    Continue care per daytime intensivist Mario Alberto HOUSTON    Critical care time: 45 minutes

## 2024-10-03 NOTE — PROGRESS NOTE ADULT - PROBLEM SELECTOR PLAN 1
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 Patient continues to have brainstem reflexes but no purposeful movements.  - Neurosurgery following. No intervention planned  - Appreciate excellent NSICU care.  - MRI planned for prognostication  - Overall poor prognosis. Plan for family meeting next week after MRI.

## 2024-10-03 NOTE — CONSULT NOTE ADULT - ASSESSMENT
Conclusion At this time, in the absence of any known surrogate that may precede his standing or have equal standing, Mr. Olivier Reyes, as the patient’s adult son, should be recognized as the appropriate surrogate decision-maker for Mr. Bakari Reyes in accordance with [facility]. Should new information be found regarding family members who may have equal standing with Mr. Olivier Reyes as surrogate (i.e., patient’s other children), joint decisions would need to be made.     Thank you for including the Ethics Consultation Service in this challenging case.      Please do not hesitate to call us if there are any questions.      Ethics Service will remain available for further conversation about the patient’s current condition and decision points that may occur.?     Case discussed with Medical Ethicist Gely Fernandez MS, PINA-C (Ethics Attending) and Katie Flores MD, MS, MPH, PINA-C ( of Medical Ethics)      More than 50% of the time of this consultation was spent in coordination of Care of Patient     References   1 [place] § 2981 (2023)   2 [place] § 2994-d (2023)   3 [place] [employer] [place] § 2994-d-1 (2003)

## 2024-10-03 NOTE — CONSULT NOTE ADULT - CONSULT REASON
To assist the healthcare team with the ethical dilemma of at 44-year-old male was admitted with altered mental status and diagnosed with hemorrhagic stroke, regarding surrogacy.

## 2024-10-03 NOTE — CONSULT NOTE ADULT - SUBJECTIVE AND OBJECTIVE BOX
Consult requested by:  	ENDY Hoyos	Role: PA	Service: Neurosurgery   Attending: JAYLON Manzo MD		   Consultant: Lilo Harmon, MSN, RN, PAYAM, [facility] (Ethics Fellow) Contact #s: [phone] (cell) [phone] (office)     Consult purpose: To assist the healthcare team with the ethical dilemma of at 44-year-old male was admitted with altered mental status and diagnosed with hemorrhagic stroke, regarding surrogacy.    Clinical summary: This is the case of a 44-year-old male with a reported history of myocardial infarction (per coworkers), cerebral vascular accident, and hypertension who was brought to [facility] by EMS after being found down at work. Upon arrival in ED, patient was noted to be vomiting while obtunded and with pinpoint pupils. While in ED, Narcan was given (without effect) and the patient was observed with decorticate posturing and with SBPs hypertensive in the 200s, prompting stroke code activation. The patient was emergently intubated for airway protection. A CT scan revealed a large pontine hemorrhage with subarachnoid extension and edema within the nabil causing effacement of the fourth ventricle. The patient was subsequently admitted to the Neurosurgical ICU at [facility]. Initial repeat CT revealed an enlarging pontine bleed with mass effect on the fourth ventricle and bleeding. Subsequent CT scans show stable hemorrhage. Patient remains intubated and without sedation. Palliative on consult. Ethics consulted for to clarify the appropriate decision maker in an incapacitated patient with poor prognosis.      Prognosis Estimate (survival in hrs, days, wks, mos, yrs): Poor (per Palliative on 10/1)   Patient Decision-Making Capacity: Lacks capacity (patient intubated and with brain stem hemorrhage)   Patient Aware of:  Diagnosis: No 	Prognosis:  No    Name of medical decision-maker should patient lack capacity:  Chauncey Reyes, son   Role:  Legal Surrogate  Contact #(s): [phone] (primary); [phone] (alternate)   Decision-Maker (i.e., HCA or Surrogate) Aware of:  Diagnosis: Yes          Prognosis:  Yes    Other Stakeholders: Corey Oh (father), Ania Oh (sister), Mk Johns (brother), Patient’s mother,  Patient’s reported daughter, Corey (father), Rizwan Tejeda (uncle); Mk Reyes (nephew), Charline Reyes (niece), Brigido Ta (, an willing point-of-contact for family members -[phone])    Evidence of Patient’s Preference of Life-Sustaining Treatment: None   Resuscitation status:   DNR:   No      DNI: No              Discussions:       Discussion with RAMONA Gonzales and Lilo Harmon, MSN, RN (Ethics Fellow) on 10/1/2024 (11:27-11:32). Discussed case in detail. RAMONA Hoyos requested assistance with determining the appropriate surrogate decision-maker for the patient as information obtained has revealed the patient has complex familial relationships.     Chart reviewed and Ethics consult initiated on 10/1/2024.     Discussion with Rizwan Oh (Patient’s uncle), Lilo Harmon, MSN, RN (Ethics Fellow), and Singaporean  (#[id number]) on 10/1/2024 (11:43-12:00). Introduced self and role of Ethics. When inquired, Mr. Oh understood that Mr. Reyes is extremely ill. Ethics explained surrogacy law and reason for asking questions regarding Mr. Reyes’s various family members. Mr. Oh expressed understanding and shared his knowledge of Mr. Reyes’s known family members in [place] including a father (Corey Oh), who resides with Mr. Reyes’s half-sister (Ania Oh). When inquired, Mr. Oh was unsure if Mr. Reyes’s mother was still living and unsure of her contact information. Mr. Oh shared his knowledge about Mr. Reyes’s children, i.e.,  Mr. Egan has a son in [place] (“Olivier”) and a daughter in [place]. When inquired, Mr. Oh was unsure of Olivier’s age (“17 or 18”) and he has no information regarding Mr. Reyes’s daughter. Mr. Oh stated he has kept Mr. Reyes’s family in [place] (i.e., patient’s sister and father) informed about the patient’s situation. Ethics thanked Mr. Oh for his time. Mr. Oh stated that he will attempt to find more information about Mr. Reyes’s mother and daughter. Ethics provided contact number where Mr. Oh can reach Ethics in the future.     Discussion attempt with Phil (Patient’s son), MICHELE Sumner, RN (Ethics Fellow), and Singaporean  (#[id number]) on 10/1/2024 at (12:14-12:15). Message left for Mr. Reyes’s son to return call to Ethics.     Discussion attempt with Ania Oh (Patient’s sister), MICHELE Sumner, RN (Ethics Fellow), and Singaporean  (#[id number]) on 10/1/2024 at (12:15-12:16). Message left for . Amy’s sister to return call to Ethics.     Discussion attempt with Olivier (Patient’s son), MICHELE Sumner, RN (Ethics Fellow), and Singaporean  (#[id number]) on 10/1/2024 (12:14-12:15). Message left for Mr. Reyes’s son to return call to.     Discussion with Ania Adriano (Patient’s sister), MICHELE Sumner, RN, (Ethics Fellow), and Singaporean  (#[id number]) on 10/1/2024 (15:08-15:09). Introduced self and role of Ethics. When inquired, Ms. Oh relayed that her uncle had called yesterday to inform them (Ms. Oh and her/patient’s father) that Mr. Egan had a stroke. Ethics discussed Ms. Oh’s relationship with Mr. Reyes. She is his half-sister sharing the same father (Corey). Ms. Oh described Rizwan Oh as her uncle. When inquired about her knowledge of Mr. Reyes’s other relatives, Ms. Oh shared that she believes Mr. Reyes’s mother was still living but does not have her contact details. Ms. Oh was also aware that Mr. Reyes has a son who lives in [place] and stated he is a minor. Ethics explained surrogacy law and the reason for gathering information about known family members. Ms. Oh shared that Mr. Reyes has not often been in contact with the family (i.e., her and their father) since [place]. She believes that Mr. Reyes has kept in contact with his mother and his son. Ms. Oh shared that Mr. Reyes has been in closer contact with their uncle while they both have been in the [place] for the last ~ 20 years. When discussing the role of surrogate decision-making, Ms. Oh believes that his mother should be involved, but also shared they (both her father and herself) would defer to her uncle (Mr. Rizwan Oh) to make decision if Mr. Reyes’s mother could not be contacted. Ms. Oh inquired about Mr. Reyes’s likelihood of recovery. Ethics shared that his current condition is critically ill. Ms. Oh shared that if they were able to find Mr. Reyes’s mother’s contact information, they would contact Ethics. Ethics thanked Ms. Oh for taking the time to speak and provided our contact number should she have any questions or wish to speak in the future.     Discussion with Mr. Rader (Patient’s roommate) and Lilo BAUTISTA, RN (Ethics Fellow) on 10/2/2024 (09:18-09:20). Introduced self and role of Ethics. Ethics inquired if Mr. Rader had any information regarding Mr. Reyse’s daughter. Mr. Rader states that he did not have any information.     Discussion with Rizwan Oh (Patient’s uncle), MICHELE Sumner, RN (Ethics Fellow), and Singaporean  (#[id number]) on 10/2/2024 (10:56-11:14). Ethics asked Mr. Oh if he has any further information regarding Mr. Reyes’s family members. Mr. Oh relayed that he believes Mr. Reyes’s son as being 17-years-old and also relayed that the family is still in the process of trying to obtain more information for Mr. Reyes’s mother. Ethics confirmed that Mr. Oh has Mr. Reyes’s belongings and requested that Mr. Oh bring the patient’s cell phone to the hospital during his next visit so that it can be examined for possible contact numbers for Mr. Reyes’s mother and daughter.      Discussion with Clinical Team with MICHELE Sumner, RN (Ethics Fellow) on 10/2/2024 (11:47-11:49). Ethics provided update of family members so far identified. Ethics will continue attempts to identify and contact family members to clarify appropriate surrogate decisionmaker.      Discussion with Brigido Ta (Patient’s ) and Lilo Harmon, MSN, RN (Ethics Fellow) on 10/2/2024 (16:10-16:53). Introduced self and role of Ethics. Mr. Ta described how he has been in contact with the patient’s family in [place] since the patient has been hospitalized. While speaking with Ethics, Mr. Ta received a call from the patient’s son (Olivier Reyes). Ethics coordinated with Mr. Ta to facilitate a return call with the patient’s son.      Discussion with Olivier Amy (Patient’s son), Lilo Harmon MSN, RN, (Ethics Fellow), and Singaporean  (#[id number]) on 10/2/2024 (16:10-16:45). Introduced self and role of Ethics. When inquired, Mr. Reyes conveyed that he is 18 years of age (birthdate “4th of April, 2008”). When inquired, Mr. Reyes confirmed that he was made aware of his father’s hospitalization from another relative (Charline Reyes, the patient’s niece). Mr. Reyes understood that his father had a “stroke.” Ethics shared with Mr. Reyes that his father had a hemorrhagic stroke and is currently intubated. Mr. Reyes reports that he has no other siblings who share the same mother and father. Mr. Reyes does not believe that the patient had officially wed his mother, and that he does not know her current whereabouts or contact information. When Ethics inquired about the best contact number where Mr. Reyes could be reached, he confirmed the previously known contact information [phone]) as correct, but also shared cell service is challenging where he is located. Mr. Reyes provided an alternate number where he can be reached via his cousin (+[phone]). When explored, Mr. Reyes permitted the team to share information with Mr. Ta and utilize Mr. Ta as a local point-of-contact with all family members. With Mr. Reyes’s permission, Ethics continued to conversation to included Mr. Ta.     Discussion with Olivier Reyes (Patient’s son), Brigido Ta (Patient’s ), MICHELE Sumner, RN, (Ethics Fellow), and Singaporean  (#[id number]) on 10/2/2024 (16:45-16:53). Ethics discussed the surrogacy laws in [place] and the importance of identifying all known family members. Ethics discussed that while Mr. Reyes that he may have to serve in the role as his father’s surrogate decisionmaker if Ethics determine there are no others who meet the criteria and what that role may entail. Mr. Reyes expressed understanding and his willingness to serve in that role. After inquiring, Ethics clarified for Mr. Reyes that his standing as surrogate does not diminish because he is located in [place] any more than being present in [place] gives him more standing as a surrogate. Ethics conveyed to Mr. Reyes that we would attempt to coordinate a family meeting over the phone so that the care team can discuss his father’s clinical status and any decisions that may need him to make with care planning. Ethics thanked Mr. Reyes for his time and provided contact information where Ethics may be reached.     Discussion with Brigido Ta (Patient’s ) and MICHELE Sumner, RN (Ethics Fellow) on 10/2/2024 (16:54-17:00). Mr. Ta described how he has known the patient for 8-9 years from when the patient had his previous stroke. Mr. Ta assisted with finding Mr. Reyes employment. He was aware of the patient’s son because the patient often discussed his son and how they spoke weekly. The patient had relayed to Mr. Ta about the relationship he had years ago with Olivier’s mother and that she and Olivier had moved back to [place] when Olivier was very young. Mr. Ta was unaware of whether Mr. Reyes had a daughter but offered to reach out to mutual contacts who knew Mr. Reyes prior be introduced to Mr. Ta. Ethics confirmed Mr. Ta’s willingness to serve a point-of-contact for all family members (Amy and Adriano). Ethics also conveyed their appreciation and provided our contact information.      Discussion with Mk [place] Amy (Patient’s nephew), MICHELE Sumner, RN (Ethics Fellow), and Singaporean  (#[id number]) on 10/2/2024 (17:02-17:17). Introduce self and role of Ethics. Mr. Reyes had come to visit patient after learning about his hospitalization from a mutual friend (co-worker of the patient). Mr. Mk Reyes clarified his relationship as the patient’s nephew as his father (Mk Johns) and the patient are brothers. Ethics shared that we were in touch with the patient’s son and that we were given permission to share the patient’s information to all family members. Ethics answered Mr. Reyes’s questions and shared that the care team continues to evaluate the extent of injury caused to the brain from the hemorrhagic stroke. Ethics explored if Mr. Reyes’s awareness of his uncle’s other familial relations. Mr. Reyes believes that his uncle also has two other brothers besides his father, but does not readily have their contact information. Ethics thanked Mr. Reyes for his time and provided contact information.      Discussion with RAMONA Castle (Neurosurgery) and Lilo Harmon MSN, RN (Ethics Fellow) on 10/2/2024 (17:20-17:23). Ethics provided updates regarding progress in identifying potential surrogate decision-makers. Ethics conveyed that while the adult son (Olivier Reyes) has been identified as a potential surrogate, Ethics will continue to make attempts to identify, locate, and contact the patient’s reported daughter to ensure due diligence in determining the appropriate surrogate decision maker. Ethics also discussed that Mr. Ta is willing to serve a point-of-contact for sharing information with all family members with Mr. Olivier Reyes’s permission.     Bioethics analysis: The central ethical issue that exists in this case is the respect for autonomy.      As the patient presently lacks capacity to make care decisions, it is appropriate to seek clarification regarding who would be an appropriate representative to make decisions on behalf of the patient.      When a patient lacks capacity to make health care decisions, a person whom the patient has appointed as their health care agent has the authority to make substituted judgements on behalf of the patient or decisions in the “best interest” of the patient. In [facility] (Public Health Law - [place] § 2981)1, a valid Health Care Proxy (HCP) form (i.e., signed by patient, dated, with agent(s) listed who meet the criteria for serving as an agent, and two witnesses to the form who are neither the appointed agent nor alternate agent listed) must exist for a person to be confirmed as the patient’s appointed health care agent. In this case, there is no evidence of a valid HCP, nor can the patient, who is presently incapacitated, appoint who he would like to be his health care agent.     When a valid [facility] HCP form is not available, in accordance with the 2010 [facility] Family Health Care Decision Act (CDCA),(2) an appropriate surrogate may make decisions in the patient’s best interest. According to the CDA, the legally recognized surrogate is determined by the following hierarchy, in the order as stated:     (a) A guardian authorized to decide about health care pursuant to article eighty-one of the mental hygiene law;     (b) The spouse, if not legally  from the patient, or the domestic partner;     (c) A son or daughter eighteen years of age or older;      (d) A parent;     (e) A brother or sister eighteen years of age or older;     (f) A close friend.     One person from the list must be reasonably available, willing, and competent to act.(3) Such person shall be the surrogate provided no other person in a class higher in priority than the person designated is available.(3) Also, that person may designate any other person on the list to be surrogate, provided no one in a class higher in priority than the person designated objects.(3)     Mr. Reyes has innate MORAL STATUS – that is, his distinct personhood, personal experience, ronel traditions, and interpretation of suffering, life-story, etc. – independent of his  surrogates, which must be respected.      The surrogate is responsible for making care decisions consistent with their familiarity with the patient’s values (including Sikhism and moral beliefs) and preferences. The surrogate should be reasonably available to the health care team. When there are multiple parties within the same hierarchal level that may meet the criteria to serve as surrogate (e.g., when there are multiple adult children, both parents living, or multiple siblings), they share equal weight in making care decisions.      Here, Mr. Bakari Reyes has multiple familial relationships. In exploring all possible family members who may meet the criteria as the patient’s surrogate decision-maker, Ethics has made contact with various family members both available here in the [place] and in [place] (who have been available by phone). Ethics was made aware that Mr. Reyes is known to have at least one son (Mr. Olivier Reyes, who resides in [place]) and possibly a daughter living in [place].     After excising due diligence in seeking collateral information in identifying, locating, and contacting who may be Mr. Reyes’s appropriate surrogate decision maker, Mr. Olivier Reyes, as the patient’s adult son, should be recognized as the appropriate surrogate decision-maker for Mr. Bakari Reyes.  Consult requested by:  	ENDY Hoyos	Role: PA	Service: Neurosurgery   Attending: JAYLON Manzo MD		   Consultant: Lilo Harmon, MSN, RN, PAYAM, [facility] (Ethics Fellow) Contact #s: [phone] (cell) [phone] (office)     Consult purpose: To assist the healthcare team with the ethical dilemma of at 44-year-old male was admitted with altered mental status and diagnosed with hemorrhagic stroke, regarding surrogacy.    Clinical summary: This is the case of a 44-year-old male with a reported history of myocardial infarction (per coworkers), cerebral vascular accident, and hypertension who was brought to [facility] by EMS after being found down at work. Upon arrival in ED, patient was noted to be vomiting while obtunded and with pinpoint pupils. While in ED, Narcan was given (without effect) and the patient was observed with decorticate posturing and with SBPs hypertensive in the 200s, prompting stroke code activation. The patient was emergently intubated for airway protection. A CT scan revealed a large pontine hemorrhage with subarachnoid extension and edema within the nabil causing effacement of the fourth ventricle. The patient was subsequently admitted to the Neurosurgical ICU at [facility]. Initial repeat CT revealed an enlarging pontine bleed with mass effect on the fourth ventricle and bleeding. Subsequent CT scans show stable hemorrhage. Patient remains intubated and without sedation. Palliative on consult. Ethics consulted for to clarify the appropriate decision maker in an incapacitated patient with poor prognosis.      Prognosis Estimate (survival in hrs, days, wks, mos, yrs): Poor (per Palliative on 10/1)   Patient Decision-Making Capacity: Lacks capacity (patient intubated and with brain stem hemorrhage)   Patient Aware of:  Diagnosis: No 	Prognosis:  No    Name of medical decision-maker should patient lack capacity:  Chauncey Reyes, son   Role:  Legal Surrogate  Contact #(s): [phone] (primary); [phone] (alternate)   Decision-Maker (i.e., HCA or Surrogate) Aware of:  Diagnosis: Yes          Prognosis:  Yes    Other Stakeholders: Corey Oh (father), Ania Oh (sister), Mk Johns (brother), Patient’s mother,  Patient’s reported daughter, Corey (father), Rizwan Tejeda (uncle); Mk Reyes (nephew), Charline Reyes (niece), Brigido Ta (, an willing point-of-contact for family members -[phone])    Evidence of Patient’s Preference of Life-Sustaining Treatment: None   Resuscitation status:   DNR:   No      DNI: No              Discussions:       Discussion with RAMONA Gonzales and Lilo Harmon, MSN, RN (Ethics Fellow) on 10/1/2024 (11:27-11:32). Discussed case in detail. RAMONA Hoyos requested assistance with determining the appropriate surrogate decision-maker for the patient as information obtained has revealed the patient has complex familial relationships.     Chart reviewed and Ethics consult initiated on 10/1/2024.     Discussion with Rizwan Oh (Patient’s uncle), Lilo Harmon, MSN, RN (Ethics Fellow), and Mauritanian  (#[id number]) on 10/1/2024 (11:43-12:00). Introduced self and role of Ethics. When inquired, Mr. Oh understood that Mr. Reyes is extremely ill. Ethics explained surrogacy law and reason for asking questions regarding Mr. Reyes’s various family members. Mr. Oh expressed understanding and shared his knowledge of Mr. Reyes’s known family members in [place] including a father (Corey Oh), who resides with Mr. Reyes’s half-sister (Ania Oh). When inquired, Mr. Oh was unsure if Mr. Reyes’s mother was still living and unsure of her contact information. Mr. Oh shared his knowledge about Mr. Reyes’s children, i.e.,  Mr. Egan has a son in [place] (“Olivier”) and a daughter in [place]. When inquired, Mr. Oh was unsure of Olivier’s age (“17 or 18”) and he has no information regarding Mr. Reyes’s daughter. Mr. Oh stated he has kept Mr. Reyes’s family in [place] (i.e., patient’s sister and father) informed about the patient’s situation. Ethics thanked Mr. Oh for his time. Mr. Oh stated that he will attempt to find more information about Mr. Reyes’s mother and daughter. Ethics provided contact number where Mr. Oh can reach Ethics in the future.     Discussion attempt with Phil (Patient’s son), MICHELE Sumner, RN (Ethics Fellow), and Mauritanian  (#[id number]) on 10/1/2024 at (12:14-12:15). Message left for Mr. Reyes’s son to return call to Ethics.     Discussion attempt with Ania Oh (Patient’s sister), MICHELE Sumner, RN (Ethics Fellow), and Mauritanian  (#[id number]) on 10/1/2024 at (12:15-12:16). Message left for . Amy’s sister to return call to Ethics.     Discussion attempt with Olivier (Patient’s son), MICHELE Sumner, RN (Ethics Fellow), and Mauritanian  (#[id number]) on 10/1/2024 (12:14-12:15). Message left for Mr. Reyes’s son to return call to.     Discussion with Ania Adriano (Patient’s sister), MICHELE Sumner, RN, (Ethics Fellow), and Mauritanian  (#[id number]) on 10/1/2024 (15:08-15:09). Introduced self and role of Ethics. When inquired, Ms. Oh relayed that her uncle had called yesterday to inform them (Ms. Oh and her/patient’s father) that Mr. Egan had a stroke. Ethics discussed Ms. Oh’s relationship with Mr. Reyes. She is his half-sister sharing the same father (Corey). Ms. Oh described Rizwan Oh as her uncle. When inquired about her knowledge of Mr. Reyes’s other relatives, Ms. Oh shared that she believes Mr. Reyes’s mother was still living but does not have her contact details. Ms. Oh was also aware that Mr. Reyes has a son who lives in [place] and stated he is a minor. Ethics explained surrogacy law and the reason for gathering information about known family members. Ms. Oh shared that Mr. Reyes has not often been in contact with the family (i.e., her and their father) since [place]. She believes that Mr. Reyes has kept in contact with his mother and his son. Ms. Oh shared that Mr. Reyes has been in closer contact with their uncle while they both have been [place] for the last ~ 20 years. When discussing the role of surrogate decision-making, Ms. Oh believes that his mother should be involved, but also shared they (both her father and herself) would defer to her uncle (Mr. Rizwan Oh) to make decision if Mr. Reyes’s mother could not be contacted. Ms. Oh inquired about Mr. Reyes’s likelihood of recovery. Ethics shared that his current condition is critically ill. Ms. Oh shared that if they were able to find Mr. Reyes’s mother’s contact information, they would contact Ethics. Ethics thanked Ms. Oh for taking the time to speak and provided our contact number should she have any questions or wish to speak in the future.     Discussion with Mr. Rader (Patient’s roommate) and Lilo BAUTISTA, RN (Ethics Fellow) on 10/2/2024 (09:18-09:20). Introduced self and role of Ethics. Ethics inquired if Mr. Rader had any information regarding Mr. Reyes’s daughter. Mr. Rader states that he did not have any information.     Discussion with Rizwan Oh (Patient’s uncle), MICHELE Sumner, RN (Ethics Fellow), and Mauritanian  (#[id number]) on 10/2/2024 (10:56-11:14). Ethics asked Mr. Oh if he has any further information regarding Mr. Reyes’s family members. Mr. Oh relayed that he believes Mr. Reyes’s son as being 17-years-old and also relayed that the family is still in the process of trying to obtain more information for Mr. Reyes’s mother. Ethics confirmed that Mr. Oh has Mr. Reyes’s belongings and requested that Mr. Oh bring the patient’s cell phone to the hospital during his next visit so that it can be examined for possible contact numbers for Mr. Reyes’s mother and daughter.      Discussion with Clinical Team with MICHELE Sumner, RN (Ethics Fellow) on 10/2/2024 (11:47-11:49). Ethics provided update of family members so far identified. Ethics will continue attempts to identify and contact family members to clarify appropriate surrogate decisionmaker.      Discussion with Brigido Ta (Patient’s ) and Lilo Harmon, MSN, RN (Ethics Fellow) on 10/2/2024 (16:10-16:53). Introduced self and role of Ethics. Mr. Ta described how he has been in contact with the patient’s family in [place] since the patient has been hospitalized. While speaking with Ethics, Mr. Ta received a call from the patient’s son (Olivier Reyes). Ethics coordinated with Mr. Ta to facilitate a return call with the patient’s son.      Discussion with Olivier Amy (Patient’s son), Lilo Harmon MSN, RN, (Ethics Fellow), and Mauritanian  (#[id number]) on 10/2/2024 (16:10-16:45). Introduced self and role of Ethics. When inquired, Mr. Reyes conveyed that he is 16 years of age (birthdate “4th of April, 2008”). When inquired, Mr. Reyes confirmed that he was made aware of his father’s hospitalization from another relative (Charline Reyes, the patient’s niece). Mr. Reyes understood that his father had a “stroke.” Ethics shared with Mr. Reyes that his father had a hemorrhagic stroke and is currently intubated. Mr. Reyes reports that he has no other siblings who share the same mother and father. Mr. Reyes does not believe that the patient had officially wed his mother, and that he does not know her current whereabouts or contact information. When Ethics inquired about the best contact number where Mr. Reyes could be reached, he confirmed the previously known contact information [phone]) as correct, but also shared cell service is challenging where he is located. Mr. Reyes provided an alternate number where he can be reached via his cousin [phone]). When explored, Mr. Reyes permitted the team to share information with Mr. Ta and utilize Mr. Ta as a local point-of-contact with all family members. With Mr. Reyes’s permission, Ethics continued to conversation to included Mr. Ta.     Discussion with Olivier Reyes (Patient’s son), Brigido Ta (Patient’s ), MICHELE Sumner, RN, (Ethics Fellow), and Mauritanian  (#[id number]) on 10/2/2024 (16:45-16:53). Ethics discussed the surrogacy laws in [place] and the importance of identifying all known family members. Ethics discussed that while Mr. Reyes that he may have to serve in the role as his father’s surrogate decisionmaker if Ethics determine there are no others who meet the criteria and what that role may entail. Mr. Reyes expressed understanding and his willingness to serve in that role. After inquiring, Ethics clarified for Mr. Reyes that his standing as surrogate does not diminish because he is located in [place] any more than being present in [place] gives him more standing as a surrogate. Ethics conveyed to Mr. Reyes that we would attempt to coordinate a family meeting over the phone so that the care team can discuss his father’s clinical status and any decisions that may need him to make with care planning. Ethics thanked Mr. Reyes for his time and provided contact information where Ethics may be reached.     Discussion with Brigido Ta (Patient’s ) and MICHELE Sumner, RN (Ethics Fellow) on 10/2/2024 (16:54-17:00). Mr. Ta described how he has known the patient for 8-9 years from when the patient had his previous stroke. Mr. Ta assisted with finding Mr. Reyes employment. He was aware of the patient’s son because the patient often discussed his son and how they spoke weekly. The patient had relayed to Mr. Ta about the relationship he had years ago with Olivier’s mother and that she and Olivier had moved back to [place] when Olivier was very young. Mr. Ta was unaware of whether Mr. Reyes had a daughter but offered to reach out to mutual contacts who knew Mr. Reyes prior be introduced to Mr. Ta. Ethics confirmed Mr. Ta’s willingness to serve a point-of-contact for all family members (Amy and Adriano). Ethics also conveyed their appreciation and provided our contact information.      Discussion with Mk Mills Amy (Patient’s nephew), MICHELE Sumner, RN (Ethics Fellow), and Mauritanian  (#[id number]) on 10/2/2024 (17:02-17:17). Introduce self and role of Ethics. Mr. Reyes had come to visit patient after learning about his hospitalization from a mutual friend (co-worker of the patient). Mr. Mk Reyes clarified his relationship as the patient’s nephew as his father (Mk Johns) and the patient are brothers. Ethics shared that we were in touch with the patient’s son and that we were given permission to share the patient’s information to all family members. Ethics answered Mr. Reyes’s questions and shared that the care team continues to evaluate the extent of injury caused to the brain from the hemorrhagic stroke. Ethics explored if Mr. Reyes’s awareness of his uncle’s other familial relations. Mr. Reyes believes that his uncle also has two other brothers besides his father, but does not readily have their contact information. Ethics thanked Mr. Reyes for his time and provided contact information.      Discussion with RAMONA Castle (Neurosurgery) and Lilo Harmon MSN, RN (Ethics Fellow) on 10/2/2024 (17:20-17:23). Ethics provided updates regarding progress in identifying potential surrogate decision-makers. Ethics conveyed that while the adult son (Olivier Reyes) has been identified as a potential surrogate, Ethics will continue to make attempts to identify, locate, and contact the patient’s reported daughter to ensure due diligence in determining the appropriate surrogate decision maker. Ethics also discussed that Mr. Ta is willing to serve a point-of-contact for sharing information with all family members with Mr. Olivier Reyes’s permission.     Bioethics analysis: The central ethical issue that exists in this case is the respect for autonomy.      As the patient presently lacks capacity to make care decisions, it is appropriate to seek clarification regarding who would be an appropriate representative to make decisions on behalf of the patient.      When a patient lacks capacity to make health care decisions, a person whom the patient has appointed as their health care agent has the authority to make substituted judgements on behalf of the patient or decisions in the “best interest” of the patient. In [facility] (Public Health Law - [place] § 2981)1, a valid Health Care Proxy (HCP) form (i.e., signed by patient, dated, with agent(s) listed who meet the criteria for serving as an agent, and two witnesses to the form who are neither the appointed agent nor alternate agent listed) must exist for a person to be confirmed as the patient’s appointed health care agent. In this case, there is no evidence of a valid HCP, nor can the patient, who is presently incapacitated, appoint who he would like to be his health care agent.     When a valid [facility] HCP form is not available, in accordance with the 2010 [facility] Family Health Care Decision Act (FHCDA),(2) an appropriate surrogate may make decisions in the patient’s best interest. According to the CDA, the legally recognized surrogate is determined by the following hierarchy, in the order as stated:     (a) A guardian authorized to decide about health care pursuant to article eighty-one of the mental hygiene law;     (b) The spouse, if not legally  from the patient, or the domestic partner;     (c) A son or daughter eighteen years of age or older;      (d) A parent;     (e) A brother or sister eighteen years of age or older;     (f) A close friend.     One person from the list must be reasonably available, willing, and competent to act.(3) Such person shall be the surrogate provided no other person in a class higher in priority than the person designated is available.(3) Also, that person may designate any other person on the list to be surrogate, provided no one in a class higher in priority than the person designated objects.(3)     Mr. Reyes has innate MORAL STATUS – that is, his distinct personhood, personal experience, ronel traditions, and interpretation of suffering, life-story, etc. – independent of his  surrogates, which must be respected.      The surrogate is responsible for making care decisions consistent with their familiarity with the patient’s values (including Confucianist and moral beliefs) and preferences. The surrogate should be reasonably available to the health care team. When there are multiple parties within the same hierarchal level that may meet the criteria to serve as surrogate (e.g., when there are multiple adult children, both parents living, or multiple siblings), they share equal weight in making care decisions.      Here, Mr. Bakari Reyes has multiple familial relationships. In exploring all possible family members who may meet the criteria as the patient’s surrogate decision-maker, Ethics has made contact with various family members both available here in the [place] and in [place] (who have been available by phone). Ethics was made aware that Mr. Reyes is known to have at least one son (Mr. Olivier Reyes, who resides in [place]) and possibly a daughter living in [place].     After excising due diligence in seeking collateral information in identifying, locating, and contacting who may be Mr. Reyes’s appropriate surrogate decision maker, Mr. Olivier Reyes, as the patient’s adult son, should be recognized as the appropriate surrogate decision-maker for Mr. Bakari Reyes.  Consult requested by:  	ENDY Hoyos	Role: PA	Service: Neurosurgery   Attending: JAYLON Manzo MD		   Consultant: Lilo Harmon, MSN, RN, PAYAM, [facility] (Ethics Fellow) Contact #s: [phone] (cell) [phone] (office)     Consult purpose: To assist the healthcare team with the ethical dilemma of at 44-year-old male was admitted with altered mental status and diagnosed with hemorrhagic stroke, regarding surrogacy.    Clinical summary: This is the case of a 44-year-old male with a reported history of myocardial infarction (per coworkers), cerebral vascular accident, and hypertension who was brought to [facility] by EMS after being found down at work. Upon arrival in ED, patient was noted to be vomiting while obtunded and with pinpoint pupils. While in ED, Narcan was given (without effect) and the patient was observed with decorticate posturing and with SBPs hypertensive in the 200s, prompting stroke code activation. The patient was emergently intubated for airway protection. A CT scan revealed a large pontine hemorrhage with subarachnoid extension and edema within the nabil causing effacement of the fourth ventricle. The patient was subsequently admitted to the Neurosurgical ICU at [facility]. Initial repeat CT revealed an enlarging pontine bleed with mass effect on the fourth ventricle and bleeding. Subsequent CT scans show stable hemorrhage. Patient remains intubated and without sedation. Palliative on consult. Ethics consulted for to clarify the appropriate decision maker in an incapacitated patient with poor prognosis.      Prognosis Estimate (survival in hrs, days, wks, mos, yrs): Poor (per Palliative on 10/1)   Patient Decision-Making Capacity: Lacks capacity (patient critically ill, with brain stem hemorrhage)   Patient Aware of:  Diagnosis: No 	Prognosis:  No    Name of medical decision-maker should patient lack capacity:  Chauncey Reyes, son   Role:  Legal Surrogate  Contact #(s): [phone] (primary); [phone] (alternate)   Decision-Maker (i.e., HCA or Surrogate) Aware of:  Diagnosis: Yes          Prognosis:  Yes    Other Stakeholders: Corey Oh (father), Ania Oh (sister), Mk Johns (brother), Patient’s mother,  Patient’s reported daughter, Corey (father), Rizwan Tejeda (uncle); Mk Reyes (nephew), Charline Reyes (niece), Brigido Ta (, an willing point-of-contact for family members -[phone])    Evidence of Patient’s Preference of Life-Sustaining Treatment: None   Resuscitation status:   DNR:   No      DNI: No              Discussions:       Discussion with RAMONA Gonzales and Lilo Harmon, MSN, RN (Ethics Fellow) on 10/1/2024 (11:27-11:32). Discussed case in detail. RAMONA Hyoos requested assistance with determining the appropriate surrogate decision-maker for the patient as information obtained has revealed the patient has complex familial relationships.     Chart reviewed and Ethics consult initiated on 10/1/2024.     Discussion with Rizwan Oh (Patient’s uncle), Lilo Harmon, MSN, RN (Ethics Fellow), and Samoan  (#[id number]) on 10/1/2024 (11:43-12:00). Introduced self and role of Ethics. When inquired, Mr. Oh understood that Mr. Reyes is extremely ill. Ethics explained surrogacy law and reason for asking questions regarding Mr. Reyes’s various family members. Mr. Oh expressed understanding and shared his knowledge of Mr. Reyes’s known family members in [place] including a father (Corey Oh), who resides with Mr. Reyes’s half-sister (Ania Oh). When inquired, Mr. Oh was unsure if Mr. Reyes’s mother was still living and unsure of her contact information. Mr. Oh shared his knowledge about Mr. Reyes’s children, i.e.,  Mr. Egan has a son in [place] (“Olivier”) and a daughter in [place]. When inquired, Mr. Oh was unsure of Olivier’s age (“17 or 18”) and he has no information regarding Mr. Reyes’s daughter. Mr. Oh stated he has kept Mr. Reyes’s family in [place] (i.e., patient’s sister and father) informed about the patient’s situation. Ethics thanked Mr. Oh for his time. Mr. Oh stated that he will attempt to find more information about Mr. Reyes’s mother and daughter. Ethics provided contact number where Mr. Oh can reach Ethics in the future.     Discussion attempt with Phil (Patient’s son), MICHELE Sumner, RN (Ethics Fellow), and Samoan  (#[id number]) on 10/1/2024 at (12:14-12:15). Message left for Mr. Reyes’s son to return call to Ethics.     Discussion attempt with Ania Oh (Patient’s sister), MICHELE Sumner, RN (Ethics Fellow), and Samoan  (#[id number]) on 10/1/2024 at (12:15-12:16). Message left for Mr. Amy’s sister to return call to Ethics.     Discussion attempt with Olivier (Patient’s son), MICHELE Sumner, RN (Ethics Fellow), and Samoan  (#[id number]) on 10/1/2024 (12:14-12:15). Message left for Mr. Reyes’s son to return call to.     Discussion with Ania Adriano (Patient’s sister), MICHELE Sumner, RN, (Ethics Fellow), and Samoan  (#[id number]) on 10/1/2024 (15:08-15:09). Introduced self and role of Ethics. When inquired, Ms. Oh relayed that her uncle had called yesterday to inform them (Ms. Oh and her/patient’s father) that Mr. Egan had a stroke. Ethics discussed Ms. Oh’s relationship with Mr. Reyes. She is his half-sister sharing the same father (Corey). Ms. Oh described Rizwan Oh as her uncle. When inquired about her knowledge of Mr. Reyes’s other relatives, Ms. Oh shared that she believes Mr. Reyes’s mother was still living but does not have her contact details. Ms. Oh was also aware that Mr. Reyes has a son who lives in [place] and stated he is a minor. Ethics explained surrogacy law and the reason for gathering information about known family members. Ms. Oh shared that Mr. Reyes has not often been in contact with the family (i.e., her and their father) since [place]. She believes that Mr. Reyes has kept in contact with his mother and his son. Ms. Oh shared that Mr. Reyes has been in closer contact with their uncle while they both have been in the [place] for the last ~ 20 years. When discussing the role of surrogate decision-making, Ms. Oh believes that his mother should be involved, but also shared they (both her father and herself) would defer to her uncle (Mr. Rizwan Oh) to make decision if Mr. Reyes’s mother could not be contacted. Ms. Oh inquired about Mr. Reyes’s likelihood of recovery. Ethics shared that his current condition is critically ill. Ms. Oh shared that if they were able to find Mr. Reyes’s mother’s contact information, they would contact Ethics. Ethics thanked Ms. Oh for taking the time to speak and provided our contact number should she have any questions or wish to speak in the future.     Discussion with Mr. Rader (Patient’s roommate) and Lilo BAUTISTA, RN (Ethics Fellow) on 10/2/2024 (09:18-09:20). Introduced self and role of Ethics. Ethics inquired if Mr. Rader had any information regarding Mr. Reyes’s daughter. Mr. Rader states that he did not have any information.     Discussion with Rizwan Oh (Patient’s uncle), MICHELE Sumner, RN (Ethics Fellow), and Samoan  (#[id number]) on 10/2/2024 (10:56-11:14). Ethics asked Mr. Oh if he has any further information regarding Mr. Reyes’s family members. Mr. Oh relayed that he believes Mr. Reyes’s son as being 17-years-old and also relayed that the family is still in the process of trying to obtain more information for Mr. Reyes’s mother. Ethics confirmed that Mr. Oh has Mr. Reyes’s belongings and requested that Mr. Oh bring the patient’s cell phone to the hospital during his next visit so that it can be examined for possible contact numbers for Mr. Reyes’s mother and daughter.      Discussion with Clinical Team with MICHELE Sumner, RN (Ethics Fellow) on 10/2/2024 (11:47-11:49). Ethics provided update of family members so far identified. Ethics will continue attempts to identify and contact family members to clarify appropriate surrogate decisionmaker.      Discussion with Brigido Cely (Patient’s ) and Lilo Harmon, MSN, RN (Ethics Fellow) on 10/2/2024 (16:10-16:53). Introduced self and role of Ethics. Mr. Ta described how he has been in contact with the patient’s family in [place] since the patient has been hospitalized. While speaking with Ethics, Mr. Ta received a call from the patient’s son (Olivier Reyes). Ethics coordinated with Mr. Ta to facilitate a return call with the patient’s son.      Discussion with Olivier Amy (Patient’s son), Lilo Harmon MSN, RN, (Ethics Fellow), and Samoan  (#[id number]) on 10/2/2024 (16:10-16:45). Introduced self and role of Ethics. When inquired, Mr. Reyes conveyed that he is 16 years of age (birthdate “4th of April, 2008”). When inquired, Mr. Reyes confirmed that he was made aware of his father’s hospitalization from another relative (Charline Reyes, the patient’s niece). Mr. Reyes understood that his father had a “stroke.” Ethics shared with Mr. Reyes that his father had a hemorrhagic stroke and is currently intubated. Mr. Reyes reports that he has no other siblings who share the same mother and father. Mr. Reyes does not believe that the patient had officially wed his mother, and that he does not know her current whereabouts or contact information. When Ethics inquired about the best contact number where Mr. Reyes could be reached, he confirmed the previously known contact information [phone]) as correct, but also shared cell service is challenging where he is located. Mr. Reyes provided an alternate number where he can be reached via his cousin [phone]). When explored, Mr. Reyes permitted the team to share information with Mr. Ta and utilize Mr. Ta as a local point-of-contact with all family members. With Mr. Reyes’s permission, Ethics continued to conversation to included Mr. Ta.     Discussion with Olivier Reyes (Patient’s son), Brigido Garciatanez (Patient’s ), MICHELE Sumner, RN, (Ethics Fellow), and Samoan  (#[id number]) on 10/2/2024 (16:45-16:53). Ethics discussed the surrogacy laws in [place] and the importance of identifying all known family members. Ethics discussed that while Mr. Reyes that he may have to serve in the role as his father’s surrogate decisionmaker if Ethics determine there are no others who meet the criteria and what that role may entail. Mr. Reyes expressed understanding and his willingness to serve in that role. After inquiring, Ethics clarified for Mr. Reyes that his standing as surrogate does not diminish because he is located in [place] any more than being present in [place] gives him more standing as a surrogate. Ethics conveyed to Mr. Reyes that we would attempt to coordinate a family meeting over the phone so that the care team can discuss his father’s clinical status and any decisions that may need him to make with care planning. Ethics thanked Mr. Reyes for his time and provided contact information where Ethics may be reached.     Discussion with Brigido Garciatanez (Patient’s ) and MICHELE Sumner, RN (Ethics Fellow) on 10/2/2024 (16:54-17:00). Mr. Ta described how he has known the patient for 8-9 years from when the patient had his previous stroke. Mr. Ta assisted with finding Mr. Reyes employment. He was aware of the patient’s son because the patient often discussed his son and how they spoke weekly. The patient had relayed to Mr. Ta about the relationship he had years ago with Olivier’s mother and that she and Olivier had moved back to [place] when Olivier was very young. Mr. Ta was unaware of whether Mr. Reyes had a daughter but offered to reach out to mutual contacts who knew Mr. Reyes prior be introduced to Mr. Ta. Ethics confirmed Mr. Ta’s willingness to serve a point-of-contact for all family members (Amy and Adriano). Ethics also conveyed their appreciation and provided our contact information.      Discussion with Mk Mills Amy (Patient’s nephew), MICHELE Sumner, RN (Ethics Fellow), and Samoan  (#[id number]) on 10/2/2024 (17:02-17:17). Introduce self and role of Ethics. Mr. Reyes had come to visit patient after learning about his hospitalization from a mutual friend (co-worker of the patient). Mr. Mk Reyes clarified his relationship as the patient’s nephew as his father (Mk Johns) and the patient are brothers. Ethics shared that we were in touch with the patient’s son and that we were given permission to share the patient’s information to all family members. Ethics answered Mr. Reyse’s questions and shared that the care team continues to evaluate the extent of injury caused to the brain from the hemorrhagic stroke. Ethics explored if Mr. Reyes’s awareness of his uncle’s other familial relations. Mr. Reyes believes that his uncle also has two other brothers besides his father, but does not readily have their contact information. Ethics thanked Mr. Reyes for his time and provided contact information.      Discussion with RAMONA Castle (Neurosurgery) and Lilo Harmon MSN, RN (Ethics Fellow) on 10/2/2024 (17:20-17:23). Ethics provided updates regarding progress in identifying potential surrogate decision-makers. Ethics conveyed that while the adult son (Olivier Reyes) has been identified as a potential surrogate, Ethics will continue to make attempts to identify, locate, and contact the patient’s reported daughter to ensure due diligence in determining the appropriate surrogate decision maker. Ethics also discussed that Mr. Ta is willing to serve a point-of-contact for sharing information with all family members with Mr. Olivier Reyes’s permission.     Bioethics analysis: The central ethical issue that exists in this case is the respect for autonomy.      As the patient presently lacks capacity to make care decisions, it is appropriate to seek clarification regarding who would be an appropriate representative to make decisions on behalf of the patient.      When a patient lacks capacity to make health care decisions, a person whom the patient has appointed as their health care agent has the authority to make substituted judgements on behalf of the patient or decisions in the “best interest” of the patient. In [facility] (Public Health Law - [place] § 2981)1, a valid Health Care Proxy (HCP) form (i.e., signed by patient, dated, with agent(s) listed who meet the criteria for serving as an agent, and two witnesses to the form who are neither the appointed agent nor alternate agent listed) must exist for a person to be confirmed as the patient’s appointed health care agent. In this case, there is no evidence of a valid HCP, nor can the patient, who is presently incapacitated, appoint who he would like to be his health care agent.     When a valid [facility] HCP form is not available, in accordance with the 2010 [facility] Family Health Care Decision Act (FHCDA),(2) an appropriate surrogate may make decisions in the patient’s best interest. According to the CDA, the legally recognized surrogate is determined by the following hierarchy, in the order as stated:     (a) A guardian authorized to decide about health care pursuant to article eighty-one of the mental hygiene law;     (b) The spouse, if not legally  from the patient, or the domestic partner;     (c) A son or daughter eighteen years of age or older;      (d) A parent;     (e) A brother or sister eighteen years of age or older;     (f) A close friend.     One person from the list must be reasonably available, willing, and competent to act.(3) Such person shall be the surrogate provided no other person in a class higher in priority than the person designated is available.(3) Also, that person may designate any other person on the list to be surrogate, provided no one in a class higher in priority than the person designated objects.(3)     Mr. Reyes has innate MORAL STATUS – that is, his distinct personhood, personal experience, ronel traditions, and interpretation of suffering, life-story, etc. – independent of his  surrogates, which must be respected.      The surrogate is responsible for making care decisions consistent with their familiarity with the patient’s values (including Hindu and moral beliefs) and preferences. The surrogate should be reasonably available to the health care team. When there are multiple parties within the same hierarchal level that may meet the criteria to serve as surrogate (e.g., when there are multiple adult children, both parents living, or multiple siblings), they share equal weight in making care decisions.      Here, Mr. Bakari Reyes has multiple familial relationships. In exploring all possible family members who may meet the criteria as the patient’s surrogate decision-maker, Ethics has made contact with various family members both available here in the [place] and in [place] (who have been available by phone). Ethics was made aware that Mr. Reyes is known to have at least one son (Mr. Olivier Reyes, who resides in [place]) and possibly a daughter living in [place].     After excising due diligence in seeking collateral information in identifying, locating, and contacting who may be Mr. Reyes’s appropriate surrogate decision maker, Mr. Olivier Reyes, as the patient’s adult son, should be recognized as the appropriate surrogate decision-maker for Mr. Bakari Reyes.

## 2024-10-04 LAB
ANION GAP SERPL CALC-SCNC: 12 MMOL/L — SIGNIFICANT CHANGE UP (ref 5–17)
ANION GAP SERPL CALC-SCNC: 9 MMOL/L — SIGNIFICANT CHANGE UP (ref 5–17)
APPEARANCE UR: CLEAR — SIGNIFICANT CHANGE UP
BACTERIA # UR AUTO: NEGATIVE /HPF — SIGNIFICANT CHANGE UP
BILIRUB UR-MCNC: NEGATIVE — SIGNIFICANT CHANGE UP
BUN SERPL-MCNC: 42 MG/DL — HIGH (ref 7–23)
BUN SERPL-MCNC: 43 MG/DL — HIGH (ref 7–23)
CALCIUM SERPL-MCNC: 8 MG/DL — LOW (ref 8.4–10.5)
CALCIUM SERPL-MCNC: 8.2 MG/DL — LOW (ref 8.4–10.5)
CAST: 2 /LPF — SIGNIFICANT CHANGE UP (ref 0–4)
CHLORIDE SERPL-SCNC: 120 MMOL/L — HIGH (ref 96–108)
CHLORIDE SERPL-SCNC: 122 MMOL/L — HIGH (ref 96–108)
CO2 SERPL-SCNC: 24 MMOL/L — SIGNIFICANT CHANGE UP (ref 22–31)
CO2 SERPL-SCNC: 25 MMOL/L — SIGNIFICANT CHANGE UP (ref 22–31)
COLOR SPEC: YELLOW — SIGNIFICANT CHANGE UP
CREAT ?TM UR-MCNC: 82 MG/DL — SIGNIFICANT CHANGE UP
CREAT SERPL-MCNC: 4.35 MG/DL — HIGH (ref 0.5–1.3)
CREAT SERPL-MCNC: 4.65 MG/DL — HIGH (ref 0.5–1.3)
DIFF PNL FLD: NEGATIVE — SIGNIFICANT CHANGE UP
EGFR: 15 ML/MIN/1.73M2 — LOW
EGFR: 15 ML/MIN/1.73M2 — LOW
EGFR: 16 ML/MIN/1.73M2 — LOW
EGFR: 16 ML/MIN/1.73M2 — LOW
GLUCOSE BLDC GLUCOMTR-MCNC: 104 MG/DL — HIGH (ref 70–99)
GLUCOSE BLDC GLUCOMTR-MCNC: 107 MG/DL — HIGH (ref 70–99)
GLUCOSE BLDC GLUCOMTR-MCNC: 119 MG/DL — HIGH (ref 70–99)
GLUCOSE BLDC GLUCOMTR-MCNC: 93 MG/DL — SIGNIFICANT CHANGE UP (ref 70–99)
GLUCOSE SERPL-MCNC: 108 MG/DL — HIGH (ref 70–99)
GLUCOSE SERPL-MCNC: 119 MG/DL — HIGH (ref 70–99)
GLUCOSE UR QL: NEGATIVE MG/DL — SIGNIFICANT CHANGE UP
HCT VFR BLD CALC: 39.1 % — SIGNIFICANT CHANGE UP (ref 39–50)
HGB BLD-MCNC: 11.9 G/DL — LOW (ref 13–17)
KETONES UR-MCNC: NEGATIVE MG/DL — SIGNIFICANT CHANGE UP
LEUKOCYTE ESTERASE UR-ACNC: ABNORMAL
MAGNESIUM SERPL-MCNC: 2.2 MG/DL — SIGNIFICANT CHANGE UP (ref 1.6–2.6)
MCHC RBC-ENTMCNC: 28.7 PG — SIGNIFICANT CHANGE UP (ref 27–34)
MCHC RBC-ENTMCNC: 30.4 GM/DL — LOW (ref 32–36)
MCV RBC AUTO: 94.4 FL — SIGNIFICANT CHANGE UP (ref 80–100)
NITRITE UR-MCNC: NEGATIVE — SIGNIFICANT CHANGE UP
NRBC # BLD: 0 /100 WBCS — SIGNIFICANT CHANGE UP (ref 0–0)
NRBC BLD-RTO: 0 /100 WBCS — SIGNIFICANT CHANGE UP (ref 0–0)
OSMOLALITY UR: 463 MOSM/KG — SIGNIFICANT CHANGE UP (ref 300–900)
PH UR: 7.5 — SIGNIFICANT CHANGE UP (ref 5–8)
PHOSPHATE SERPL-MCNC: 4.5 MG/DL — SIGNIFICANT CHANGE UP (ref 2.5–4.5)
PLATELET # BLD AUTO: 193 K/UL — SIGNIFICANT CHANGE UP (ref 150–400)
POTASSIUM SERPL-MCNC: 4.1 MMOL/L — SIGNIFICANT CHANGE UP (ref 3.5–5.3)
POTASSIUM SERPL-MCNC: 4.4 MMOL/L — SIGNIFICANT CHANGE UP (ref 3.5–5.3)
POTASSIUM SERPL-SCNC: 4.1 MMOL/L — SIGNIFICANT CHANGE UP (ref 3.5–5.3)
POTASSIUM SERPL-SCNC: 4.4 MMOL/L — SIGNIFICANT CHANGE UP (ref 3.5–5.3)
POTASSIUM UR-SCNC: 41 MMOL/L — SIGNIFICANT CHANGE UP
PROT ?TM UR-MCNC: 302 MG/DL — HIGH (ref 0–12)
PROT UR-MCNC: 300 MG/DL
PROT/CREAT UR-RTO: 3.7 RATIO — HIGH (ref 0–0.2)
RBC # BLD: 4.14 M/UL — LOW (ref 4.2–5.8)
RBC # FLD: 14.2 % — SIGNIFICANT CHANGE UP (ref 10.3–14.5)
RBC CASTS # UR COMP ASSIST: 12 /HPF — HIGH (ref 0–4)
SODIUM SERPL-SCNC: 156 MMOL/L — HIGH (ref 135–145)
SODIUM SERPL-SCNC: 156 MMOL/L — HIGH (ref 135–145)
SODIUM UR-SCNC: 108 MMOL/L — SIGNIFICANT CHANGE UP
SP GR SPEC: 1.02 — SIGNIFICANT CHANGE UP (ref 1–1.03)
SQUAMOUS # UR AUTO: 0 /HPF — SIGNIFICANT CHANGE UP (ref 0–5)
UROBILINOGEN FLD QL: 1 MG/DL — SIGNIFICANT CHANGE UP (ref 0.2–1)
UUN UR-MCNC: 452 MG/DL — SIGNIFICANT CHANGE UP
WBC # BLD: 10.07 K/UL — SIGNIFICANT CHANGE UP (ref 3.8–10.5)
WBC # FLD AUTO: 10.07 K/UL — SIGNIFICANT CHANGE UP (ref 3.8–10.5)
WBC UR QL: 0 /HPF — SIGNIFICANT CHANGE UP (ref 0–5)

## 2024-10-04 PROCEDURE — 71045 X-RAY EXAM CHEST 1 VIEW: CPT | Mod: 26

## 2024-10-04 PROCEDURE — 99291 CRITICAL CARE FIRST HOUR: CPT

## 2024-10-04 PROCEDURE — 99254 IP/OBS CNSLTJ NEW/EST MOD 60: CPT

## 2024-10-04 PROCEDURE — 71045 X-RAY EXAM CHEST 1 VIEW: CPT | Mod: 26,77

## 2024-10-04 PROCEDURE — 95720 EEG PHY/QHP EA INCR W/VEEG: CPT

## 2024-10-04 RX ORDER — CEFAZOLIN SODIUM IN 0.9 % NACL 3 G/100 ML
1000 INTRAVENOUS SOLUTION, PIGGYBACK (ML) INTRAVENOUS EVERY 12 HOURS
Refills: 0 | Status: COMPLETED | OUTPATIENT
Start: 2024-10-04 | End: 2024-10-14

## 2024-10-04 RX ORDER — ATROPINE SULFATE 1 %
2 OINTMENT (GRAM) OPHTHALMIC (EYE) EVERY 8 HOURS
Refills: 0 | Status: COMPLETED | OUTPATIENT
Start: 2024-10-04 | End: 2024-10-06

## 2024-10-04 RX ORDER — SODIUM CHLORIDE 9 G/1000ML
1000 INJECTION, SOLUTION INTRAVENOUS
Refills: 0 | Status: DISCONTINUED | OUTPATIENT
Start: 2024-10-04 | End: 2024-10-08

## 2024-10-04 RX ORDER — FENTANYL CITRATE-0.9 % NACL/PF 100MCG/2ML
25 SYRINGE (ML) INTRAVENOUS
Refills: 0 | Status: DISCONTINUED | OUTPATIENT
Start: 2024-10-04 | End: 2024-10-11

## 2024-10-04 RX ADMIN — Medication 15 MILLILITER(S): at 17:13

## 2024-10-04 RX ADMIN — Medication 2 DROP(S): at 13:24

## 2024-10-04 RX ADMIN — AMLODIPINE BESYLATE 10 MILLIGRAM(S): 10 TABLET ORAL at 05:29

## 2024-10-04 RX ADMIN — Medication 2 DROP(S): at 22:47

## 2024-10-04 RX ADMIN — Medication 100 MILLIGRAM(S): at 21:18

## 2024-10-04 RX ADMIN — ACETYLCYSTEINE 4 MILLILITER(S): 200 INHALANT RESPIRATORY (INHALATION) at 00:45

## 2024-10-04 RX ADMIN — Medication 1 APPLICATION(S): at 05:48

## 2024-10-04 RX ADMIN — HEPARIN SODIUM 5000 UNIT(S): 1000 INJECTION INTRAVENOUS; SUBCUTANEOUS at 13:23

## 2024-10-04 RX ADMIN — Medication 10 MILLIGRAM(S): at 23:35

## 2024-10-04 RX ADMIN — Medication 25 MICROGRAM(S): at 21:00

## 2024-10-04 RX ADMIN — IPRATROPIUM BROMIDE AND ALBUTEROL SULFATE 3 MILLILITER(S): .5; 2.5 SOLUTION RESPIRATORY (INHALATION) at 05:58

## 2024-10-04 RX ADMIN — LABETALOL HYDROCHLORIDE 200 MILLIGRAM(S): 200 TABLET, FILM COATED ORAL at 21:18

## 2024-10-04 RX ADMIN — Medication 650 MILLIGRAM(S): at 01:11

## 2024-10-04 RX ADMIN — SODIUM CHLORIDE 100 MILLILITER(S): 9 INJECTION, SOLUTION INTRAVENOUS at 09:00

## 2024-10-04 RX ADMIN — Medication 40 MILLIGRAM(S): at 11:22

## 2024-10-04 RX ADMIN — Medication 25 MILLIGRAM(S): at 23:35

## 2024-10-04 RX ADMIN — Medication 100 MILLIGRAM(S): at 10:33

## 2024-10-04 RX ADMIN — Medication 25 MICROGRAM(S): at 01:30

## 2024-10-04 RX ADMIN — Medication 25 MICROGRAM(S): at 17:43

## 2024-10-04 RX ADMIN — Medication 25 MICROGRAM(S): at 23:14

## 2024-10-04 RX ADMIN — Medication 10 MILLIGRAM(S): at 16:02

## 2024-10-04 RX ADMIN — Medication 650 MILLIGRAM(S): at 23:14

## 2024-10-04 RX ADMIN — ALBUMIN (HUMAN) 125 MILLILITER(S): 12.5 INJECTION, SOLUTION INTRAVENOUS at 00:44

## 2024-10-04 RX ADMIN — HEPARIN SODIUM 5000 UNIT(S): 1000 INJECTION INTRAVENOUS; SUBCUTANEOUS at 05:29

## 2024-10-04 RX ADMIN — Medication 650 MILLIGRAM(S): at 21:01

## 2024-10-04 RX ADMIN — ACETYLCYSTEINE 4 MILLILITER(S): 200 INHALANT RESPIRATORY (INHALATION) at 05:59

## 2024-10-04 RX ADMIN — LABETALOL HYDROCHLORIDE 200 MILLIGRAM(S): 200 TABLET, FILM COATED ORAL at 13:23

## 2024-10-04 RX ADMIN — HEPARIN SODIUM 5000 UNIT(S): 1000 INJECTION INTRAVENOUS; SUBCUTANEOUS at 21:05

## 2024-10-04 RX ADMIN — IPRATROPIUM BROMIDE AND ALBUTEROL SULFATE 3 MILLILITER(S): .5; 2.5 SOLUTION RESPIRATORY (INHALATION) at 12:41

## 2024-10-04 RX ADMIN — IPRATROPIUM BROMIDE AND ALBUTEROL SULFATE 3 MILLILITER(S): .5; 2.5 SOLUTION RESPIRATORY (INHALATION) at 17:20

## 2024-10-04 RX ADMIN — SODIUM CHLORIDE 80 MILLILITER(S): 9 INJECTION, SOLUTION INTRAVENOUS at 00:44

## 2024-10-04 RX ADMIN — Medication 1 APPLICATION(S): at 05:30

## 2024-10-04 RX ADMIN — Medication 1 APPLICATION(S): at 17:13

## 2024-10-04 RX ADMIN — LABETALOL HYDROCHLORIDE 200 MILLIGRAM(S): 200 TABLET, FILM COATED ORAL at 05:29

## 2024-10-04 RX ADMIN — CEFTRIAXONE 100 MILLIGRAM(S): 500 INJECTION, POWDER, FOR SOLUTION INTRAMUSCULAR; INTRAVENOUS at 09:04

## 2024-10-04 RX ADMIN — Medication 4 MILLILITER(S): at 17:21

## 2024-10-04 RX ADMIN — Medication 4 MILLILITER(S): at 05:58

## 2024-10-04 RX ADMIN — Medication 25 MICROGRAM(S): at 17:13

## 2024-10-04 RX ADMIN — Medication 4 MILLILITER(S): at 00:45

## 2024-10-04 RX ADMIN — Medication 15 MILLILITER(S): at 05:29

## 2024-10-04 RX ADMIN — ACETYLCYSTEINE 4 MILLILITER(S): 200 INHALANT RESPIRATORY (INHALATION) at 12:41

## 2024-10-04 RX ADMIN — SODIUM CHLORIDE 100 MILLILITER(S): 9 INJECTION, SOLUTION INTRAVENOUS at 13:24

## 2024-10-04 RX ADMIN — ACETYLCYSTEINE 4 MILLILITER(S): 200 INHALANT RESPIRATORY (INHALATION) at 17:20

## 2024-10-04 RX ADMIN — IPRATROPIUM BROMIDE AND ALBUTEROL SULFATE 3 MILLILITER(S): .5; 2.5 SOLUTION RESPIRATORY (INHALATION) at 00:45

## 2024-10-04 RX ADMIN — Medication 25 MICROGRAM(S): at 11:22

## 2024-10-04 RX ADMIN — Medication 25 MICROGRAM(S): at 11:52

## 2024-10-04 NOTE — PROGRESS NOTE ADULT - SUBJECTIVE AND OBJECTIVE BOX
[facility] -- ADDITIONAL PROGRESS NOTE    Nighttime rounds were performed     Patient is a 45 y/o male with unknown past medical history (reported stroke and MI by coworkers). He presented to [facility] with AMS. Prior to this, pt was working at [email or web address] when he was found down by coworkers. EMS called and pt brought to [facility] ED. Intubated, sedated, started on cardene for SBPs in 200s. CT head showed pontine bleed. Transferred to NSICU for further management.  (30 Sep 2024 12:55)    On admission, the patient was:  GCS:   Cash-Shane:  modified Robles:  ICH score: 3  NIHSS:    *** HIGH RISK OF DVT PRESENT ON ADMISSION ***      ICU Vital Signs Last 24 Hrs  T(C): 36.8 (04 Oct 2024 18:15), Max: 38.4 (04 Oct 2024 01:30)  T(F): 98.2 (04 Oct 2024 18:15), Max: 101.1 (04 Oct 2024 01:30)  HR: 90 (04 Oct 2024 20:00) (66 - 95)  BP: 167/100 (04 Oct 2024 20:00) (131/86 - 180/113)  BP(mean): 126 (04 Oct 2024 20:00) (103 - 142)  ABP: 167/98 (04 Oct 2024 20:00) (141/76 - 176/99)  ABP(mean): 121 (04 Oct 2024 20:00) (98 - 136)  RR: 16 (04 Oct 2024 20:00) (13 - 20)  SpO2: 97% (04 Oct 2024 20:00) (96% - 100%)      10-03-24 @ 07:01  -  10-04-24 @ 07:00  --------------------------------------------------------  IN: 3420 mL / OUT: 1625 mL / NET: 1795 mL    10-04-24 @ 07:01  -  10-04-24 @ 21:12  --------------------------------------------------------  IN: 2170 mL / OUT: 750 mL / NET: 1420 mL      Mode: AC/ CMV (Assist Control/ Continuous Mandatory Ventilation), RR (machine): 12, TV (machine): 430, FiO2: 50, PEEP: 6, ITime: 1, MAP: 8.7, PIP: 16      EXAMINATION:  General: Not on sedation  HEENT:  MMM, ETT  Neuro: Patient not on sedation, pupils 3mm and reactive, cough/gag, corneals reflexes present on L, not on right, following commands only to look down  RUE trace withdraws, LUE extends  RLE brisk TF, LLE  TF  ? following commands to look down at his toes when asked 10/3. Not often reproducible  Cards: S1/S2, RRR  Respiratory: Clear, no wheeze  Abdomen: Soft, non- tender  Extremities: No edema  Skin: Warm/dry      MEDICATIONS:   acetaminophen   Oral Liquid .. 650 milliGRAM(s) Oral every 6 hours PRN  acetylcysteine 10%  Inhalation 4 milliLiter(s) Inhalation every 6 hours  albuterol/ipratropium for Nebulization 3 milliLiter(s) Nebulizer every 6 hours  amLODIPine   Tablet 10 milliGRAM(s) Oral daily  atropine 1% Ophthalmic Solution for SubLingual Use 2 Drop(s) SubLingual every 8 hours  ceFAZolin   IVPB 1000 milliGRAM(s) IV Intermittent every 12 hours  chlorhexidine 0.12% Liquid 15 milliLiter(s) Oral Mucosa every 12 hours  chlorhexidine 2% Cloths 1 Application(s) Topical <User Schedule>  fentaNYL    Injectable 25 MICROGram(s) IV Push every 2 hours PRN  heparin   Injectable 5000 Unit(s) SubCutaneous every 8 hours  labetalol 200 milliGRAM(s) Enteral Tube every 8 hours  lactated ringers. 1000 milliLiter(s) (100 mL/Hr) IV Continuous <Continuous>  pantoprazole  Injectable 40 milliGRAM(s) IV Push daily  petrolatum Ophthalmic Ointment 1 Application(s) Both EYES two times a day  sodium chloride 7% Inhalation 4 milliLiter(s) Inhalation every 12 hours    LABS:                    11.9   10.07 )-----------( 193      ( 04 Oct 2024 05:30 )             39.1     10-04    156[H]  |  120[H]  |  42[H]  ----------------------------<  119[H]  4.4   |  24  |  4.35[H]    Ca    8.0[L]      04 Oct 2024 09:29  Phos  4.5     10-04  Mg     2.2     10-04        IMAGING:   CT HEAD: 9/30 09:19  FINDINGS:    Large pontine hemorrhage with subarachnoid extension. Edema within the nabil causing effacement of the fourth ventricle. No hydrocephalus as of   yet. Chronic small vessel ischemic changes in the white matter with old infarcts in the left external capsule, left caudate nucleus, the right   lentiform nucleus. No extra-axial collection or midline shift. No CT evidence of an acute ischemic stroke.      CT head 10/1 15:50  IMPRESSION:  1. Large pontine hemorrhage with subarachnoid extension. Edema causes effacement of the cerebral aqueduct and fourth ventricle without hydrocephalus at this time.    2. Multiple old infarcts as above on a background of chronic small vessel ischemic changes.    As compared to prior CT head same day at 9:15 AM, marginally enlarged pontine hemorrhage with associated edema and mass effect upon the fourth   ventricle and cerebral aqueduct.  Mild posterior frontal lobe subarachnoid hemorrhage. Mild posterior left frontal lobe subarachnoid hemorrhage.      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 Monitor.     PULM: Intubated  Full vent support. CPAP as tolerated  ABG, CXR  VAP bundle    CV:  SBP goal 100-160  On amlodipine and labetalol  Baseline EKG, TTE     RENAL: CKD; worsened  Fluids: LR at 100cc/hr  Renal US reviewed. No hydronephrosis. Demonstrates chronic medical renal disease  Na goal 145-155.  Free water flushes  Renal consulted    GI:  Diet: TFs; nepro  GI prophylaxis [] not indicated [x] PPI [] other:  Bowel regimen [] colace [x] senna [] other: miralax  LBM: 10/3; diarrhea    ENDO:   Goal euglycemia (-180)    HEME/ONC:  VTE prophylaxis: [x] SCDs [x] SQH    ID: Febrile, MSSA pneumonia  Cultures:  Sputum: Staph aureus  Blood cultures NGTD  Procalcitonin:   MRSA nares: MSSA positive  Antibiotics: S/P vancomycin. CTX-> Ancef    Palliative care and Ethics following    Continue care per daytime intensivist Mario Alberto HOUSTON    Critical care time: 45 minutes

## 2024-10-04 NOTE — DIETITIAN INITIAL EVALUATION ADULT - ADD RECOMMEND
1. NPO  **CONSIDER the following machine: Jevity 1.5 formula via orogastric tube: start at 20mL/hour, increase by 10mL/hour every 4-6 hours to GOAL of 65mL/hour x 18 hours + 2 LPS (liquid protein supplement), to provide 1170mL total volume from tube feeding, 1955 calories, 105g protein, 889mL free water; this meets ~28 calories/kg ideal body weight, ~22 nonprotein calories/kg ideal body weight, 1.5g protein/kg ideal body weight (70kg); consider 180mL every 4 hours  **Monitor for signs/symptoms of intolerance, maintain aspiration precautions at all times  2. Monitor weights (weekly), skin integrity, GI function, chemistry/labs  3. Pain and bowel regimen per medical team  4. Align nutrition with goals of care at all times 1. NPO  **CONSIDER the following machine: Jevity 1.5 formula via orogastric tube: start at 20mL/hour, increase by 10mL/hour every 4-6 hours to GOAL of 65mL/hour x 18 hours + 2 LPS (liquid protein supplement), to provide 1170mL total volume from tube feeding, 1955 calories, 105g protein, 889mL free water; this meets ~28 calories/kg ideal body weight, ~22 nonprotein calories/kg ideal body weight, 1.5g protein/kg ideal body weight (70kg); consider 180mL every 4 hours  **consider Banatrol 2x/day to promote fecal bulk**  **Monitor for signs/symptoms of intolerance, maintain aspiration precautions at all times  2. Monitor weights (weekly), skin integrity, GI function, chemistry/labs  3. Pain and bowel regimen per medical team  4. Align nutrition with goals of care at all times  **recommendations communicated with and pended to medical team**

## 2024-10-04 NOTE — PROGRESS NOTE ADULT - SUBJECTIVE AND OBJECTIVE BOX
S/Overnight events:  Unable to obtain subjective data 2/2 pt's neurologic status  Increased LR to 80cc/hr. Aded albumin 250ml bolus for incr. Cr    Hospital Course:   9/30: transferred from [facility]. A line placed. Versed dc'd. Cy Rader at bedside, states pt has family in [place], cannot confirm medications or PMH other than stroke and MI. 250cc bolus 3% given. LR switched to NS. hydralazine 25q8 started, 3% started, switched propofol to precedex   10/1: stability CTH done. Added labetalol, started TF. Palliative consulted. ethics consulted to determine surrogate. febrile 103, pan cx sent  10/2: BD 2, GEORGE overnight. TF resumed. Desatt'd to 80s, FiO2 inc. to 50. Fentanyl given, ABG, CXR ordered. Maxxed on precedex, started on propofol for DARIEN -4 - -5. Precedex dc'd. Duonebs, mucomyst, hypertonic added. 3% dc'd. Cardene dc'd. Start vanc/CTX. Increased labetalol 200q8. MRSA negative, dc'd vanc. ETT pulled back 2cm x 2, good positioning after confirmatory chest xray. Ethics attempting to establish HCP with family. Na 159, starting FW 250q6 for range 150-155.   10/3: BD3, GEORGE o/n, neuro stable. Na elevating, FW increased to 300q6. Dc'd bowel reg for diarrhea. vEEG started. SQH 5000q8 tonight.   10/4: BD 4, albumn bolus, incr. LR to 80 2/2 incr. in Cr       Vital Signs Last 24 Hrs  T(C): 38.2 (04 Oct 2024 02:00), Max: 38.4 (04 Oct 2024 01:30)  T(F): 100.8 (04 Oct 2024 02:00), Max: 101.1 (04 Oct 2024 01:30)  HR: 73 (04 Oct 2024 04:00) (67 - 93)  BP: 131/86 (04 Oct 2024 04:00) (131/86 - 173/108)  BP(mean): 103 (04 Oct 2024 04:00) (103 - 135)  RR: 20 (04 Oct 2024 04:00) (15 - 20)  SpO2: 97% (04 Oct 2024 04:00) (96% - 100%)    Parameters below as of 04 Oct 2024 04:00  Patient On (Oxygen Delivery Method): ventilator        I&O's Detail    02 Oct 2024 07:01  -  03 Oct 2024 07:00  --------------------------------------------------------  IN:    Free Water: 250 mL    IV PiggyBack: 300 mL    Jevity 1.2: 80 mL    Lactated Ringers: 1200 mL    Propofol: 14.4 mL    sodium chloride 3% + sodium acetate 50:50: 90 mL  Total IN: 1934.4 mL    OUT:    Indwelling Catheter - Urethral (mL): 1620 mL    Voided (mL): 500 mL  Total OUT: 2120 mL    Total NET: -185.6 mL      03 Oct 2024 07:01  -  04 Oct 2024 04:40  --------------------------------------------------------  IN:    Free Water: 600 mL    IV PiggyBack: 50 mL    Jevity 1.2: 820 mL    Lactated Ringers: 950 mL  Total IN: 2420 mL    OUT:    Voided (mL): 925 mL  Total OUT: 925 mL    Total NET: 1495 mL        I&O's Summary    02 Oct 2024 07:01  -  03 Oct 2024 07:00  --------------------------------------------------------  IN: 1934.4 mL / OUT: 2120 mL / NET: -185.6 mL    03 Oct 2024 07:01  -  04 Oct 2024 04:40  --------------------------------------------------------  IN: 2420 mL / OUT: 925 mL / NET: 1495 mL        PHYSICAL EXAM:  Constitutional: Intubated, NAD  Respiratory: breathing non-labored, symmetrical chest wall movement, RLL mild rhonchi  Cardiovascuar: RRR, no murmurs  Gastrointestinal: abdomen soft  Neurological:  Ao0. Pupils 3mm equal reactive. appears to follow command to look downwards when asked in Thai. +cough/gag/corneals  RUE: withdraws to noxious  LUE: extends to noxious  RLE: triple flexion to noxious  LLE: triple flexion to noxious      TUBES/LINES:  [] CVC  [x] A-line  [] Lumbar Drain  [] Ventriculostomy  [] Vuong  [] Other    DIET:  [] NPO  [] Mechanical  x] Tube feeds    LABS:                        12.3   12.78 )-----------( 194      ( 03 Oct 2024 02:23 )             39.7     10-03    155[H]  |  122[H]  |  40[H]  ----------------------------<  169[H]  4.2   |  25  |  3.94[H]    Ca    8.5      03 Oct 2024 16:47  Phos  3.8     10-03  Mg     2.3     10-03        Urinalysis Basic - ( 03 Oct 2024 16:47 )    Color: x / Appearance: x / SG: x / pH: x  Gluc: 169 mg/dL / Ketone: x  / Bili: x / Urobili: x   Blood: x / Protein: x / Nitrite: x   Leuk Esterase: x / RBC: x / WBC x   Sq Epi: x / Non Sq Epi: x / Bacteria: x          CAPILLARY BLOOD GLUCOSE      POCT Blood Glucose.: 119 mg/dL (04 Oct 2024 00:20)  POCT Blood Glucose.: 136 mg/dL (03 Oct 2024 17:08)  POCT Blood Glucose.: 124 mg/dL (03 Oct 2024 11:27)  POCT Blood Glucose.: 120 mg/dL (03 Oct 2024 05:51)      Drug Levels: [] N/A    CSF Analysis: [] N/A      Allergies    Allergy Status Unknown    Intolerances      MEDICATIONS:  Antibiotics:  cefTRIAXone   IVPB 2000 milliGRAM(s) IV Intermittent every 24 hours    Neuro:  acetaminophen   Oral Liquid .. 650 milliGRAM(s) Oral every 6 hours PRN  fentaNYL    Injectable 25 MICROGram(s) IV Push every 2 hours PRN    Anticoagulation:  heparin   Injectable 5000 Unit(s) SubCutaneous every 8 hours    OTHER:  acetylcysteine 10%  Inhalation 4 milliLiter(s) Inhalation every 6 hours  albuterol/ipratropium for Nebulization 3 milliLiter(s) Nebulizer every 6 hours  amLODIPine   Tablet 10 milliGRAM(s) Oral daily  chlorhexidine 0.12% Liquid 15 milliLiter(s) Oral Mucosa every 12 hours  chlorhexidine 2% Cloths 1 Application(s) Topical <User Schedule>  insulin lispro (ADMELOG) corrective regimen sliding scale   SubCutaneous every 6 hours  labetalol 200 milliGRAM(s) Enteral Tube every 8 hours  pantoprazole  Injectable 40 milliGRAM(s) IV Push daily  petrolatum Ophthalmic Ointment 1 Application(s) Both EYES two times a day  sodium chloride 3%  Inhalation 4 milliLiter(s) Inhalation every 6 hours    IVF:  lactated ringers. 1000 milliLiter(s) IV Continuous <Continuous>    CULTURES:  Culture Results:   Numerous Staphylococcus aureus  Normal Respiratory Miranda present (10-01 @ 19:54)  Culture Results:   No growth at 48 Hours (10-01 @ 17:46)    RADIOLOGY & ADDITIONAL TESTS:      ASSESSMENT:  43 y/o PMH ?stroke/MI present to [facility] after collapsing at work. Decorticate posturing, vomiting, intubated for airway protection. 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 Transferred to [facility] for further management    Neuro:  - neuro/vitals q1  - pain control: fentanyl 25q2 prn, tylenol  - [facility] 9/30: enlarged pontine hemorrhage, repeat done: stable   - vEEG (10/3-  - stroke core measures, stroke neuro following  - palliative and ethics following, attempting to establish HCP    CV:  - SBP<160  - HTN: norvasc 10mg, labetalol 200q8  - echo (9/30) EF 75%    Resp:  - Intubated, FVS  - duonebs/mucomyst/3% for secretions     GI:  - TF via OGT  - bowel reg held for diarrhea, LBM 10/3  - protonix while intubated  - Hypernatremia:  q6hrs    Renal:  - LR@80cc/hr  - Na 150-155  - voiding  - trend BUN/Cr: CKD, Cr uptrending  - renal US 10/1: echogenicity c/w chronic med renal dz    Endo:  - ISS  - a1c 5.4    Heme:  - DVT ppx: SCDs, SQH 5000q8    ID:  - pan cx 10/1  - MRSA swab negative, MSSA +, s/p 1 dose vanc (10/2), continuing CTX (10/2 -   - ETT cx growing s aureus    Dispo: NSICU, full code    D/w Dr. D'Amico, Dr. Llanes

## 2024-10-04 NOTE — CONSULT NOTE ADULT - ASSESSMENT
43 y/o M not known to have any kidney disease, h/o HTN, DM, CAD   currently admitted for  brain sterm hemorrhagic stroke due to HTN emergency since 9/30, currently  intubated and ventilated  , found to have rising  SCr, but making urine , nephrology consulted for the management for non-oliguric ANIKA,    Impression:  Non-oliguric ATN likely on the background of CKD. Pt     41 y/o M not known to have any kidney disease, h/o HTN, DM, CAD   currently admitted for  brain sterm hemorrhagic stroke due to HTN emergency since 9/30, currently  intubated and ventilated  , found to have rising  SCr, but making urine , nephrology consulted for the management for non-oliguric ANIKA,    Impression:  Non-oliguric ATN likely on the background of CKD.  as pt already has nephrotic range proteinuria    Plan  - no acute indication for HD at this time : pt is not acidotic, hyperkalemic, non oliguric  -Monitor acid-base status daily  - strict I/O chart  -trend electrolytes daily  -avoid nephrotoxic drugs  -renally adjust meds    -adequate BP control  renal will follow

## 2024-10-04 NOTE — DIETITIAN INITIAL EVALUATION ADULT - OTHER INFO
Unknown age male, unknown past medical history (reported stroke and MI by coworkers) presented to [facility] with AMS, Pt was working at [employer] when he was found down by coworkers. EMS called and pt brought to [facility] ED. Intubated, sedated, started on cardene for SBPs in 200s. CT head showed brain stem bleed. Transferred to NSICU for further management.    Pt seen at bedside for initial assessment-intubated (on full ventilator support, AC-CMV mode), SpO2 %, pt was on propofol sedation gtt/drip, however this has been discontinued, pt is on a fentanyl IV push medication, no other drips or pressor support, MAPs ranged from , MAP was 107 when dietitian was in pt's room. Nondistended abdomen. No noted nausea or emesis per nursing; no BM documented. Soft, nontender, nondistended abdomen per attending physician. Pt NPO with tube feedings via orogastric tube: Jevity 1.2 with goal of 60mL/hour; per nursing, pt noted with some diarrhea today and yesterday. No known food allergies documented. Dosing weight of ~176 pounds. Pt's ideal body weight is 154 pounds, pt is ~114% of ideal body weight. No edema documented at this time. No pressure ulcers documented at this time. Surgical incisions per chart. Mookie score: 9. Labs reviewed: elevated POCT glucose in the past 24 hours (104-136), sodium (156), BUN (42), Creatinine (4.35), serum Glucose (119), low eGFR (16), low calcium (8.0). Observed pt with no overt signs of muscle or fat wasting. Based on ASPEN guidelines, pt does not meet criteria for malnutrition at this time. Education deferred at this time. RD to remain available and follow up as appropriate. Please see nutrition recommendations below.

## 2024-10-04 NOTE — PROGRESS NOTE ADULT - SUBJECTIVE AND OBJECTIVE BOX
NSCU Progress Note    Assessment/Hospital Course:        24 Hour Events/Subjective:  - exam unchanged, ongoing goc  - uptrending cr      REVIEW OF SYSTEMS:  - negative except as above    VITALS:   - reviewed      IMAGING/DATA:   - Reviewed          PHYSICAL EXAM:    General: intubated  CVS: RRR  Pulm: CTAB  GI: Soft, NTND  Extremities: No LE Edema  Neuro: intubated, AOx0, 2mm reactive pupils, ?vertical gaze in tact on command?, uppers extensor, BLE 0/5

## 2024-10-04 NOTE — DIETITIAN INITIAL EVALUATION ADULT - OTHER CALCULATIONS
Pt is >100% ideal body weight in the ICU, thus ideal body weight used for all calculations. Needs adjusted for age, clinical course, vented, ICU/critical care status.

## 2024-10-04 NOTE — PROGRESS NOTE ADULT - ASSESSMENT
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 CPAP as tolerated  ABG, CXR  VAP bundle  ABG    CV:  SBP goal 100-160  labetalol 200mg tid  amlodipine 10  Katt      RENAL:   ANIKA though suspect CKD  Fluids: LR at 80cc/hr  Vuong catheter in place; monitor Is/Os  Na goal 145-155  BMPq12  Nephrology consult    GI:  Diet: TF  GI prophylaxis [] not indicated [x] PPI [] other:  Bowel regimen [] colace [x] senna [] other: miralax  Monitor LFTs    ENDO:   Goal euglycemia (-180)      HEME/ONC:  VTE prophylaxis: [x] SCDs [x] chemoprophylaxis tonight      ID:   Mild leukocytosis  f/u pan culture  CTX (10/2 - ) x7d  MRSA swab neg

## 2024-10-04 NOTE — EEG REPORT - NS EEG TEXT BOX
[facility] Department of Neurology  Inpatient Continuous video-Electroencephalogram  [address], [address] [place] [id number], T: [phone]    Patient Name:	GARETT DELEON    :	1980  MRN:	[id number]    Study Start Date/Time:	10/3/2024, 2:36:07 PM  Study End Date/Time:    Referred by:  Randy D'Amico, MD    Brief Clinical History:  GARETT DELEON is a 44 year old Male admitted with brainstem hemorrhage and coma; study performed to investigate for seizures or markers of epilepsy.   Technologist notes: -  Diagnosis Code:  R56.9 convulsions/seizure    Pertinent Medication:  n/a    Acquisition Details:  Electroencephalography was acquired using a minimum of 21 channels on an [employer] Neurology system v 9.3.1 with electrode placement according to the standard International 10-20 system following ACNS (American Clinical Neurophysiology Society) guidelines.  Anterior temporal T1 and T2 electrodes were utilized whenever possible.  The [employer]TEK automated spike & seizure detections were all reviewed in detail, in addition to the entire raw EEG.    Findings:  Day 1:  10/3/2024, 2:36:07 PM to next morning at 07:00 AM   Background:  continuous, with predominantly alpha and beta frequencies.  Generalized Slowing:  None  Symmetry/Focality: No persistent asymmetries of voltage or frequency.     Voltage:  Low (most <20uV LBP Peak-Trough)  Organization:  Absent  Posterior Dominant Rhythm:  absent  Sleep:  Rudimentary but likely N2 transients present.  Variability:   Yes		Reactivity:  Unclear    Spontaneous Activity:  No epileptiform discharges   Events:  •	No electrographic seizures or significant clinical events occurred during this study.  Provocations:  •	Hyperventilation: was not performed.  •	Photic stimulation: was not performed.  Daily Summary:    •	There was mild background slowing and poor organization of the background and lack of a clear awake state.  •	Brief change to probable rudimentary N2 sleep present by end of the recording.  •	There were no findings of active epilepsy, however this alone does not rule out the diagnosis.         Jean Claude Farley MD  Attending Neurologist, [facility] Epilepsy Program       [facility] Department of Neurology  Inpatient Continuous video-Electroencephalogram  [address], [address] [place] [id number], T: [phone]    Patient Name:	GARETT DELEON    :	1980  MRN:	[id number]    Study Start Date/Time:	10/3/2024, 2:36:07 PM  Study End Date/Time:     10/4/2024, 10:00 AM    Referred by:  Randy D'Amico, MD    Brief Clinical History:  GARETT DELEON is a 44 year old Male admitted with brainstem hemorrhage and coma; study performed to investigate for seizures or markers of epilepsy.   Technologist notes: -  Diagnosis Code:  R56.9 convulsions/seizure    Pertinent Medication:  n/a    Acquisition Details:  Electroencephalography was acquired using a minimum of 21 channels on an [employer] Neurology system v 9.3.1 with electrode placement according to the standard International 10-20 system following ACNS (American Clinical Neurophysiology Society) guidelines.  Anterior temporal T1 and T2 electrodes were utilized whenever possible.  The XLTEK automated spike & seizure detections were all reviewed in detail, in addition to the entire raw EEG.    Findings:  Day 1:  10/3/2024, 2:36:07 PM to next morning at 10:00 AM   Background:  continuous, with predominantly alpha and beta frequencies.  Generalized Slowing:  None  Symmetry/Focality: No persistent asymmetries of voltage or frequency.     Voltage:  Low (most <20uV LBP Peak-Trough)  Organization:  Absent  Posterior Dominant Rhythm:  absent  Sleep:  Rudimentary but likely N2 transients present.  Variability:   Yes		Reactivity:  Unclear    Spontaneous Activity:  No epileptiform discharges   Events:  •	No electrographic seizures or significant clinical events occurred during this study.  Provocations:  •	Hyperventilation: was not performed.  •	Photic stimulation: was not performed.  Daily Summary:    •	There was mild background slowing and poor organization of the background and lack of a clear awake state.  •	Brief change to probable rudimentary N2 sleep present by end of the recording.  •	There were no findings of active epilepsy, however this alone does not rule out the diagnosis.         Jean Claude Farley MD  Attending Neurologist, [facility] Epilepsy Program

## 2024-10-04 NOTE — DIETITIAN INITIAL EVALUATION ADULT - NS FNS DIET ORDER
Diet, NPO with Tube Feed:   Tube Feeding Modality: Orogastric  Nepro with Carb Steady (NEPRORTH)  Total Volume for 24 Hours (mL): 1440  Total Number of Cans: 6  Continuous  Starting Tube Feed Rate {mL per Hour}: 20  Increase Tube Feed Rate by (mL): 10     Every 4 hours  Until Goal Tube Feed Rate (mL per Hour): 60  Tube Feed Duration (in Hours): 24  Tube Feed Start Time: 10:00 (10-04-24 @ 11:31)

## 2024-10-04 NOTE — DIETITIAN INITIAL EVALUATION ADULT - PERTINENT MEDS FT
MEDICATIONS  (STANDING):  acetylcysteine 10%  Inhalation 4 milliLiter(s) Inhalation every 6 hours  albuterol/ipratropium for Nebulization 3 milliLiter(s) Nebulizer every 6 hours  amLODIPine   Tablet 10 milliGRAM(s) Oral daily  atropine 1% Ophthalmic Solution for SubLingual Use 2 Drop(s) SubLingual every 8 hours  ceFAZolin   IVPB 1000 milliGRAM(s) IV Intermittent every 12 hours  chlorhexidine 0.12% Liquid 15 milliLiter(s) Oral Mucosa every 12 hours  chlorhexidine 2% Cloths 1 Application(s) Topical <User Schedule>  heparin   Injectable 5000 Unit(s) SubCutaneous every 8 hours  labetalol 200 milliGRAM(s) Enteral Tube every 8 hours  lactated ringers. 1000 milliLiter(s) (100 mL/Hr) IV Continuous <Continuous>  pantoprazole  Injectable 40 milliGRAM(s) IV Push daily  petrolatum Ophthalmic Ointment 1 Application(s) Both EYES two times a day  sodium chloride 7% Inhalation 4 milliLiter(s) Inhalation every 12 hours    MEDICATIONS  (PRN):  acetaminophen   Oral Liquid .. 650 milliGRAM(s) Oral every 6 hours PRN Temp greater or equal to 38C (100.4F), Mild Pain (1 - 3)  fentaNYL    Injectable 25 MICROGram(s) IV Push every 2 hours PRN agitation, severe pain, vent desynchrony

## 2024-10-04 NOTE — DIETITIAN INITIAL EVALUATION ADULT - NSFNSGIIOFT_GEN_A_CORE
10-03-24 @ 07:01  -  10-04-24 @ 07:00  --------------------------------------------------------  OUT:  Total OUT: 0 mL    Total NET: 1060 mL      10-04-24 @ 07:01  -  10-04-24 @ 14:08  --------------------------------------------------------  OUT:  Total OUT: 0 mL    Total NET: 240 mL

## 2024-10-04 NOTE — CONSULT NOTE ADULT - SUBJECTIVE AND OBJECTIVE BOX
NEPHROLOGY SERVICE INITIAL CONSULT NOTE    HPI: 41 y/o M not known to have any kidney disease, h/o HTN, DM,   currently admitted for  brain sterm hemorrhagic sroke  since , intubated and ventilated due to HTN emergency , found to have rising  SCr, but making urine , nephrology consulted for the management for ANIKA      ADDITIONAL NEPHROLOGY HPI:    REVIEW OF SYSTEMS:   Otherwise negative except as specified in HPI    PAST MEDICAL AND SURGICAL HISTORY:   PAST MEDICAL & SURGICAL HISTORY:  Acute myocardial infarction      CVA (cerebral vascular accident)          FAMILY HISTORY:  FAMILY HISTORY:      Otherwise Noncontributory to current admission    SOCIAL HISTORY:  Tobacco use: Denies  EtOH use: Denies  Illicit drug use: Denies    HOME MEDICATIONS:      ACTIVE MEDICATIONS:  MEDICATIONS  (STANDING):  acetylcysteine 10%  Inhalation 4 milliLiter(s) Inhalation every 6 hours  albuterol/ipratropium for Nebulization 3 milliLiter(s) Nebulizer every 6 hours  amLODIPine   Tablet 10 milliGRAM(s) Oral daily  atropine 1% Ophthalmic Solution for SubLingual Use 2 Drop(s) SubLingual every 8 hours  ceFAZolin   IVPB 1000 milliGRAM(s) IV Intermittent every 12 hours  chlorhexidine 0.12% Liquid 15 milliLiter(s) Oral Mucosa every 12 hours  chlorhexidine 2% Cloths 1 Application(s) Topical <User Schedule>  heparin   Injectable 5000 Unit(s) SubCutaneous every 8 hours  labetalol 200 milliGRAM(s) Enteral Tube every 8 hours  lactated ringers. 1000 milliLiter(s) (100 mL/Hr) IV Continuous <Continuous>  pantoprazole  Injectable 40 milliGRAM(s) IV Push daily  petrolatum Ophthalmic Ointment 1 Application(s) Both EYES two times a day  sodium chloride 7% Inhalation 4 milliLiter(s) Inhalation every 12 hours    MEDICATIONS  (PRN):  acetaminophen   Oral Liquid .. 650 milliGRAM(s) Oral every 6 hours PRN Temp greater or equal to 38C (100.4F), Mild Pain (1 - 3)  fentaNYL    Injectable 25 MICROGram(s) IV Push every 2 hours PRN agitation, severe pain, vent desynchrony      ALLERGIES:  Allergies    Allergy Status Unknown    Intolerances        VITAL SIGNS:  Vital Signs Last 24 Hrs  T(C): 37.3 (04 Oct 2024 14:19), Max: 38.4 (04 Oct 2024 01:30)  T(F): 99.1 (04 Oct 2024 14:19), Max: 101.1 (04 Oct 2024 01:30)  HR: 77 (04 Oct 2024 15:00) (66 - 95)  BP: 180/113 (04 Oct 2024 15:00) (131/86 - 180/113)  BP(mean): 142 (04 Oct 2024 15:00) (103 - 142)  RR: 16 (04 Oct 2024 15:00) (13 - 20)  SpO2: 99% (04 Oct 2024 15:00) (96% - 100%)    Parameters below as of 04 Oct 2024 15:00  Patient On (Oxygen Delivery Method): ventilator    O2 Concentration (%): 50    10-03-24 @ 07:01  -  10-04-24 @ 07:00  --------------------------------------------------------  IN:    Free Water: 1200 mL    IV PiggyBack: 50 mL    Jevity 1.2: 1060 mL    Lactated Ringers: 1110 mL  Total IN: 3420 mL    OUT:    Voided (mL): 1625 mL  Total OUT: 1625 mL    Total NET: 1795 mL      10-04-24 @ 07:01  -  10-04-24 @ 15:43  --------------------------------------------------------  IN:    Free Water: 350 mL    IV PiggyBack: 100 mL    Jevity 1.2: 300 mL    Jevity 1.5: 60 mL    Lactated Ringers: 880 mL  Total IN: 1690 mL    OUT:    Voided (mL): 450 mL  Total OUT: 450 mL    Total NET: 1240 mL          PHYSICAL EXAM:  HEENT:  MMM, ETT  Cards: S1/S2, RRR  Respiratory: Clear, no wheeze  Abdomen: Soft, non- tender  Extremities: No edema  Skin: Warm/dry  NS: intubated and sedated      LABS:                        11.9   10.07 )-----------( 193      ( 04 Oct 2024 05:30 )             39.1     10    156[H]  |  120[H]  |  42[H]  ----------------------------<  119[H]  4.4   |  24  |  4.35[H]    Ca    8.0[L]      04 Oct 2024 09:29  Phos  4.5     10  Mg     2.2     10        Urinalysis Basic - ( 04 Oct 2024 09:29 )    Color: Yellow / Appearance: Clear / S.017 / pH: x  Gluc: 119 mg/dL / Ketone: Negative mg/dL  / Bili: Negative / Urobili: 1.0 mg/dL   Blood: x / Protein: 300 mg/dL / Nitrite: Negative   Leuk Esterase: Trace / RBC: 12 /HPF / WBC 0 /HPF   Sq Epi: x / Non Sq Epi: 0 /HPF / Bacteria: Negative /HPF          CAPILLARY BLOOD GLUCOSE      POCT Blood Glucose.: 104 mg/dL (04 Oct 2024 11:14)  POCT Blood Glucose.: 107 mg/dL (04 Oct 2024 06:17)  POCT Blood Glucose.: 119 mg/dL (04 Oct 2024 00:20)  POCT Blood Glucose.: 136 mg/dL (03 Oct 2024 17:08)          RADIOLOGY & ADDITIONAL TESTS: Reviewed. NEPHROLOGY SERVICE INITIAL CONSULT NOTE    HPI: 43 y/o M not known to have any kidney disease, h/o HTN, DM, CAD   currently admitted for  brain sterm hemorrhagic stroke due to HTN emergency since , currently  intubated and ventilated  , found to have rising  SCr, but making urine , nephrology consulted for the management for non-oliguric ANIKA      ADDITIONAL NEPHROLOGY HPI:    REVIEW OF SYSTEMS:   Otherwise negative except as specified in HPI    PAST MEDICAL AND SURGICAL HISTORY:   Acute myocardial infarction      CVA (cerebral vascular accident)          FAMILY HISTORY:  FAMILY HISTORY:      Otherwise Noncontributory to current admission    SOCIAL HISTORY:  Tobacco use: Denies  EtOH use: Denies  Illicit drug use: Denies    HOME MEDICATIONS:      ACTIVE MEDICATIONS:  MEDICATIONS  (STANDING):  acetylcysteine 10%  Inhalation 4 milliLiter(s) Inhalation every 6 hours  albuterol/ipratropium for Nebulization 3 milliLiter(s) Nebulizer every 6 hours  amLODIPine   Tablet 10 milliGRAM(s) Oral daily  atropine 1% Ophthalmic Solution for SubLingual Use 2 Drop(s) SubLingual every 8 hours  ceFAZolin   IVPB 1000 milliGRAM(s) IV Intermittent every 12 hours  chlorhexidine 0.12% Liquid 15 milliLiter(s) Oral Mucosa every 12 hours  chlorhexidine 2% Cloths 1 Application(s) Topical <User Schedule>  heparin   Injectable 5000 Unit(s) SubCutaneous every 8 hours  labetalol 200 milliGRAM(s) Enteral Tube every 8 hours  lactated ringers. 1000 milliLiter(s) (100 mL/Hr) IV Continuous <Continuous>  pantoprazole  Injectable 40 milliGRAM(s) IV Push daily  petrolatum Ophthalmic Ointment 1 Application(s) Both EYES two times a day  sodium chloride 7% Inhalation 4 milliLiter(s) Inhalation every 12 hours    MEDICATIONS  (PRN):  acetaminophen   Oral Liquid .. 650 milliGRAM(s) Oral every 6 hours PRN Temp greater or equal to 38C (100.4F), Mild Pain (1 - 3)  fentaNYL    Injectable 25 MICROGram(s) IV Push every 2 hours PRN agitation, severe pain, vent desynchrony      ALLERGIES:  Allergies    Allergy Status Unknown    Intolerances        VITAL SIGNS:  Vital Signs Last 24 Hrs  T(C): 37.3 (04 Oct 2024 14:19), Max: 38.4 (04 Oct 2024 01:30)  T(F): 99.1 (04 Oct 2024 14:19), Max: 101.1 (04 Oct 2024 01:30)  HR: 77 (04 Oct 2024 15:00) (66 - 95)  BP: 180/113 (04 Oct 2024 15:00) (131/86 - 180/113)  BP(mean): 142 (04 Oct 2024 15:00) (103 - 142)  RR: 16 (04 Oct 2024 15:00) (13 - 20)  SpO2: 99% (04 Oct 2024 15:00) (96% - 100%)    Parameters below as of 04 Oct 2024 15:00  Patient On (Oxygen Delivery Method): ventilator    O2 Concentration (%): 50    10-03-24 @ 07:01  -  10-04-24 @ 07:00  --------------------------------------------------------  IN:    Free Water: 1200 mL    IV PiggyBack: 50 mL    Jevity 1.2: 1060 mL    Lactated Ringers: 1110 mL  Total IN: 3420 mL    OUT:    Voided (mL): 1625 mL  Total OUT: 1625 mL    Total NET: 1795 mL      10-04-24 @ 07:01  -  10-04-24 @ 15:43  --------------------------------------------------------  IN:    Free Water: 350 mL    IV PiggyBack: 100 mL    Jevity 1.2: 300 mL    Jevity 1.5: 60 mL    Lactated Ringers: 880 mL  Total IN: 1690 mL    OUT:    Voided (mL): 450 mL  Total OUT: 450 mL    Total NET: 1240 mL          PHYSICAL EXAM:  HEENT:  MMM, ETT  Cards: S1/S2, RRR  Respiratory: Clear, no wheeze  Abdomen: Soft, non- tender  Extremities: No edema  Skin: Warm/dry  NS: intubated and sedated      LABS:                        11.9   10.07 )-----------( 193      ( 04 Oct 2024 05:30 )             39.1     10-04    156[H]  |  120[H]  |  42[H]  ----------------------------<  119[H]  4.4   |  24  |  4.35[H]    Ca    8.0[L]      04 Oct 2024 09:29  Phos  4.5     10  Mg     2.2     10-        Urinalysis Basic - ( 04 Oct 2024 09:29 )    Color: Yellow / Appearance: Clear / S.017 / pH: x  Gluc: 119 mg/dL / Ketone: Negative mg/dL  / Bili: Negative / Urobili: 1.0 mg/dL   Blood: x / Protein: 300 mg/dL / Nitrite: Negative   Leuk Esterase: Trace / RBC: 12 /HPF / WBC 0 /HPF   Sq Epi: x / Non Sq Epi: 0 /HPF / Bacteria: Negative /HPF          CAPILLARY BLOOD GLUCOSE      POCT Blood Glucose.: 104 mg/dL (04 Oct 2024 11:14)  POCT Blood Glucose.: 107 mg/dL (04 Oct 2024 06:17)  POCT Blood Glucose.: 119 mg/dL (04 Oct 2024 00:20)  POCT Blood Glucose.: 136 mg/dL (03 Oct 2024 17:08)          RADIOLOGY & ADDITIONAL TESTS: Reviewed.

## 2024-10-04 NOTE — CHART NOTE - NSCHARTNOTEFT_GEN_A_CORE
Attempted to call Chauncey levin using  #[id number] at the following numbers with no response, left vm. [phone] (primary); [phone] (alternate)

## 2024-10-05 LAB
ANION GAP SERPL CALC-SCNC: 11 MMOL/L — SIGNIFICANT CHANGE UP (ref 5–17)
BASE EXCESS BLDA CALC-SCNC: -2.2 MMOL/L — LOW (ref -2–3)
BUN SERPL-MCNC: 49 MG/DL — HIGH (ref 7–23)
CALCIUM SERPL-MCNC: 7.9 MG/DL — LOW (ref 8.4–10.5)
CHLORIDE SERPL-SCNC: 122 MMOL/L — HIGH (ref 96–108)
CO2 BLDA-SCNC: 24 MMOL/L — SIGNIFICANT CHANGE UP (ref 19–24)
CO2 SERPL-SCNC: 24 MMOL/L — SIGNIFICANT CHANGE UP (ref 22–31)
CREAT SERPL-MCNC: 4.87 MG/DL — HIGH (ref 0.5–1.3)
EGFR: 14 ML/MIN/1.73M2 — LOW
EGFR: 14 ML/MIN/1.73M2 — LOW
GLUCOSE SERPL-MCNC: 137 MG/DL — HIGH (ref 70–99)
HCO3 BLDA-SCNC: 22 MMOL/L — SIGNIFICANT CHANGE UP (ref 21–28)
HCT VFR BLD CALC: 39.2 % — SIGNIFICANT CHANGE UP (ref 39–50)
HGB BLD-MCNC: 11.8 G/DL — LOW (ref 13–17)
LACTATE SERPL-SCNC: 0.7 MMOL/L — SIGNIFICANT CHANGE UP (ref 0.5–2)
MAGNESIUM SERPL-MCNC: 2.2 MG/DL — SIGNIFICANT CHANGE UP (ref 1.6–2.6)
MCHC RBC-ENTMCNC: 27.9 PG — SIGNIFICANT CHANGE UP (ref 27–34)
MCHC RBC-ENTMCNC: 30.1 GM/DL — LOW (ref 32–36)
MCV RBC AUTO: 92.7 FL — SIGNIFICANT CHANGE UP (ref 80–100)
NRBC # BLD: 0 /100 WBCS — SIGNIFICANT CHANGE UP (ref 0–0)
NRBC BLD-RTO: 0 /100 WBCS — SIGNIFICANT CHANGE UP (ref 0–0)
PCO2 BLDA: 37 MMHG — SIGNIFICANT CHANGE UP (ref 35–48)
PH BLDA: 7.39 — SIGNIFICANT CHANGE UP (ref 7.35–7.45)
PHOSPHATE SERPL-MCNC: 5 MG/DL — HIGH (ref 2.5–4.5)
PLATELET # BLD AUTO: 203 K/UL — SIGNIFICANT CHANGE UP (ref 150–400)
PO2 BLDA: 102 MMHG — SIGNIFICANT CHANGE UP (ref 83–108)
POTASSIUM SERPL-MCNC: 4.2 MMOL/L — SIGNIFICANT CHANGE UP (ref 3.5–5.3)
POTASSIUM SERPL-SCNC: 4.2 MMOL/L — SIGNIFICANT CHANGE UP (ref 3.5–5.3)
RBC # BLD: 4.23 M/UL — SIGNIFICANT CHANGE UP (ref 4.2–5.8)
RBC # FLD: 13.7 % — SIGNIFICANT CHANGE UP (ref 10.3–14.5)
SAO2 % BLDA: 98.3 % — HIGH (ref 94–98)
SODIUM SERPL-SCNC: 157 MMOL/L — HIGH (ref 135–145)
WBC # BLD: 7.84 K/UL — SIGNIFICANT CHANGE UP (ref 3.8–10.5)
WBC # FLD AUTO: 7.84 K/UL — SIGNIFICANT CHANGE UP (ref 3.8–10.5)

## 2024-10-05 PROCEDURE — 99291 CRITICAL CARE FIRST HOUR: CPT

## 2024-10-05 PROCEDURE — 71045 X-RAY EXAM CHEST 1 VIEW: CPT | Mod: 26

## 2024-10-05 RX ORDER — LABETALOL HYDROCHLORIDE 200 MG/1
10 TABLET, FILM COATED ORAL ONCE
Refills: 0 | Status: COMPLETED | OUTPATIENT
Start: 2024-10-05 | End: 2024-10-05

## 2024-10-05 RX ADMIN — Medication 4 MILLILITER(S): at 17:12

## 2024-10-05 RX ADMIN — Medication 15 MILLILITER(S): at 20:07

## 2024-10-05 RX ADMIN — Medication 650 MILLIGRAM(S): at 14:13

## 2024-10-05 RX ADMIN — ACETYLCYSTEINE 4 MILLILITER(S): 200 INHALANT RESPIRATORY (INHALATION) at 17:02

## 2024-10-05 RX ADMIN — LABETALOL HYDROCHLORIDE 200 MILLIGRAM(S): 200 TABLET, FILM COATED ORAL at 13:11

## 2024-10-05 RX ADMIN — Medication 40 MILLIGRAM(S): at 11:10

## 2024-10-05 RX ADMIN — IPRATROPIUM BROMIDE AND ALBUTEROL SULFATE 3 MILLILITER(S): .5; 2.5 SOLUTION RESPIRATORY (INHALATION) at 00:03

## 2024-10-05 RX ADMIN — LABETALOL HYDROCHLORIDE 200 MILLIGRAM(S): 200 TABLET, FILM COATED ORAL at 05:17

## 2024-10-05 RX ADMIN — IPRATROPIUM BROMIDE AND ALBUTEROL SULFATE 3 MILLILITER(S): .5; 2.5 SOLUTION RESPIRATORY (INHALATION) at 17:02

## 2024-10-05 RX ADMIN — Medication 4 MILLILITER(S): at 06:07

## 2024-10-05 RX ADMIN — Medication 1 APPLICATION(S): at 10:00

## 2024-10-05 RX ADMIN — Medication 1 APPLICATION(S): at 06:00

## 2024-10-05 RX ADMIN — Medication 25 MICROGRAM(S): at 12:15

## 2024-10-05 RX ADMIN — HEPARIN SODIUM 5000 UNIT(S): 1000 INJECTION INTRAVENOUS; SUBCUTANEOUS at 13:11

## 2024-10-05 RX ADMIN — ACETYLCYSTEINE 4 MILLILITER(S): 200 INHALANT RESPIRATORY (INHALATION) at 00:03

## 2024-10-05 RX ADMIN — ACETYLCYSTEINE 4 MILLILITER(S): 200 INHALANT RESPIRATORY (INHALATION) at 06:05

## 2024-10-05 RX ADMIN — Medication 100 MILLIGRAM(S): at 21:33

## 2024-10-05 RX ADMIN — Medication 1 APPLICATION(S): at 17:30

## 2024-10-05 RX ADMIN — Medication 650 MILLIGRAM(S): at 13:11

## 2024-10-05 RX ADMIN — AMLODIPINE BESYLATE 10 MILLIGRAM(S): 10 TABLET ORAL at 05:17

## 2024-10-05 RX ADMIN — IPRATROPIUM BROMIDE AND ALBUTEROL SULFATE 3 MILLILITER(S): .5; 2.5 SOLUTION RESPIRATORY (INHALATION) at 23:46

## 2024-10-05 RX ADMIN — SODIUM CHLORIDE 100 MILLILITER(S): 9 INJECTION, SOLUTION INTRAVENOUS at 09:51

## 2024-10-05 RX ADMIN — IPRATROPIUM BROMIDE AND ALBUTEROL SULFATE 3 MILLILITER(S): .5; 2.5 SOLUTION RESPIRATORY (INHALATION) at 06:06

## 2024-10-05 RX ADMIN — Medication 15 MILLILITER(S): at 17:31

## 2024-10-05 RX ADMIN — LABETALOL HYDROCHLORIDE 10 MILLIGRAM(S): 200 TABLET, FILM COATED ORAL at 14:07

## 2024-10-05 RX ADMIN — Medication 100 MILLIGRAM(S): at 10:00

## 2024-10-05 RX ADMIN — Medication 25 MILLIGRAM(S): at 13:11

## 2024-10-05 RX ADMIN — LABETALOL HYDROCHLORIDE 200 MILLIGRAM(S): 200 TABLET, FILM COATED ORAL at 21:33

## 2024-10-05 RX ADMIN — Medication 2 DROP(S): at 06:00

## 2024-10-05 RX ADMIN — Medication 25 MICROGRAM(S): at 11:10

## 2024-10-05 RX ADMIN — HEPARIN SODIUM 5000 UNIT(S): 1000 INJECTION INTRAVENOUS; SUBCUTANEOUS at 21:33

## 2024-10-05 RX ADMIN — HEPARIN SODIUM 5000 UNIT(S): 1000 INJECTION INTRAVENOUS; SUBCUTANEOUS at 05:17

## 2024-10-05 RX ADMIN — Medication 25 MILLIGRAM(S): at 05:17

## 2024-10-05 RX ADMIN — IPRATROPIUM BROMIDE AND ALBUTEROL SULFATE 3 MILLILITER(S): .5; 2.5 SOLUTION RESPIRATORY (INHALATION) at 11:46

## 2024-10-05 RX ADMIN — Medication 25 MILLIGRAM(S): at 21:33

## 2024-10-05 RX ADMIN — ACETYLCYSTEINE 4 MILLILITER(S): 200 INHALANT RESPIRATORY (INHALATION) at 11:46

## 2024-10-05 RX ADMIN — ACETYLCYSTEINE 4 MILLILITER(S): 200 INHALANT RESPIRATORY (INHALATION) at 23:46

## 2024-10-05 RX ADMIN — Medication 2 DROP(S): at 13:11

## 2024-10-05 NOTE — PROGRESS NOTE ADULT - SUBJECTIVE AND OBJECTIVE BOX
HPI:  Unknown age male, unknown past medical history (reported stroke and MI by coworkers) presented to [facility] with AMS, Pt was working at [employer] when he was found down by coworkers. EMS called and pt brought to [facility] ED. Intubated, sedated, started on cardene for SBPs in 200s. CT head showed brain stem bleed. Transferred to NSICU for further management.  (30 Sep 2024 12:55)      Subjective:  o/n 10mg IVP hydralazine given for SBP 170s and started on hydralazine 25q8 via OGT.    Hospital course:  : transferred from [facility]. A line placed. Versed dc'd. Timate Prasanth at bedside, states pt has family in [place], cannot confirm medications or PMH other than stroke and MI. 250cc bolus 3% given. LR switched to NS. hydralazine 25q8 started, 3% started, switched propofol to precedex   10/1: stability CTH done. Added labetalol, started TF. Palliative consulted. ethics consulted to determine surrogate. febrile 103, pan cx sent  10/2: BD 2, GEORGE overnight. TF resumed. Desatt'd to 80s, FiO2 inc. to 50. Fentanyl given, ABG, CXR ordered. Maxxed on precedex, started on propofol for DARIEN -4 - -5. Precedex dc'd. Duonebs, mucomyst, hypertonic added. 3% dc'd. Cardene dc'd. Start vanc/CTX. Increased labetalol 200q8. MRSA negative, dc'd vanc. ETT pulled back 2cm x 2, good positioning after confirmatory chest xray. Ethics attempting to establish HCP with family. Na 159, starting FW 250q6 for range 150-155.   10/3: BD3, GEORGE o/n, neuro stable. Na elevating, FW increased to 300q6. Dc'd bowel reg for diarrhea. vEEG started. SQH 5000q8 tonight.   10/4: BD 4, albumn bolus, incr. LR to 80 2/2 incr. in Cr, LR to 100cc/hr for uptrending Cr. Started 7% hypersal for 48hrs and SL atropine for inline/oral thick secretions. Dc'd CTX and started ancef for MSSA in the sputum. Nephrology consulted for CKD, f/u recs. SBP 170s, given hydralazine 10mg IVP.   10: BD5, o/n 10mg IVP hydralazine given for SBP 170s and started on hydralazine 25q8 via OGT.      Vital Signs Last 24 Hrs  T(C): 36.9 (05 Oct 2024 01:40), Max: 38.3 (04 Oct 2024 21:00)  T(F): 98.4 (05 Oct 2024 01:40), Max: 100.9 (04 Oct 2024 21:00)  HR: 85 (05 Oct 2024 04:00) (66 - 95)  BP: 147/87 (05 Oct 2024 04:00) (131/76 - 180/113)  BP(mean): 112 (05 Oct 2024 04:00) (96 - 142)  RR: 16 (05 Oct 2024 01:00) (13 - 20)  SpO2: 99% (05 Oct 2024 04:00) (96% - 100%)    Parameters below as of 04 Oct 2024 23:00  Patient On (Oxygen Delivery Method): ventilator        I&O's Summary    03 Oct 2024 07:01  -  04 Oct 2024 07:00  --------------------------------------------------------  IN: 3420 mL / OUT: 1625 mL / NET: 1795 mL    04 Oct 2024 07:01  -  05 Oct 2024 04:27  --------------------------------------------------------  IN: 2170 mL / OUT: 850 mL / NET: 1320 mL        PHYSICAL EXAM:  General: patient seen laying supine in bed in NAD  Neuro: OEs spontaneously, does not FC, does not respond to questions, +L corneal, absent R corneal reflex, +cough/gag, LUE extensor to noxious, RUE and RLE withdraws to noxious, LLE TF to noxious  HEENT: PERRL  Neck: supple  Cardiac: RRR, S1S2  Pulmonary: chest rise symmetric, +intubated  Abdomen: soft, nontender, nondistended, +OGT  Ext: perfusing well  Skin: warm, dry        LABS:                        11.9   10.07 )-----------( 193      ( 04 Oct 2024 05:30 )             39.1     10-04    156[H]  |  120[H]  |  42[H]  ----------------------------<  119[H]  4.4   |  24  |  4.35[H]    Ca    8.0[L]      04 Oct 2024 09:29  Phos  4.5     10-  Mg     2.2     10-04        Urinalysis Basic - ( 04 Oct 2024 09:29 )    Color: Yellow / Appearance: Clear / S.017 / pH: x  Gluc: 119 mg/dL / Ketone: Negative mg/dL  / Bili: Negative / Urobili: 1.0 mg/dL   Blood: x / Protein: 300 mg/dL / Nitrite: Negative   Leuk Esterase: Trace / RBC: 12 /HPF / WBC 0 /HPF   Sq Epi: x / Non Sq Epi: 0 /HPF / Bacteria: Negative /HPF          CAPILLARY BLOOD GLUCOSE      POCT Blood Glucose.: 93 mg/dL (04 Oct 2024 21:34)  POCT Blood Glucose.: 104 mg/dL (04 Oct 2024 11:14)  POCT Blood Glucose.: 107 mg/dL (04 Oct 2024 06:17)      Drug Levels: [] N/A    CSF Analysis: [] N/A      Allergies    Allergy Status Unknown    Intolerances      MEDICATIONS:  Antibiotics:  ceFAZolin   IVPB 1000 milliGRAM(s) IV Intermittent every 12 hours    Neuro:  acetaminophen   Oral Liquid .. 650 milliGRAM(s) Oral every 6 hours PRN  fentaNYL    Injectable 25 MICROGram(s) IV Push every 2 hours PRN    Anticoagulation:  heparin   Injectable 5000 Unit(s) SubCutaneous every 8 hours    OTHER:  acetylcysteine 10%  Inhalation 4 milliLiter(s) Inhalation every 6 hours  albuterol/ipratropium for Nebulization 3 milliLiter(s) Nebulizer every 6 hours  amLODIPine   Tablet 10 milliGRAM(s) Oral daily  atropine 1% Ophthalmic Solution for SubLingual Use 2 Drop(s) SubLingual every 8 hours  chlorhexidine 0.12% Liquid 15 milliLiter(s) Oral Mucosa every 12 hours  chlorhexidine 2% Cloths 1 Application(s) Topical <User Schedule>  hydrALAZINE 25 milliGRAM(s) Oral every 8 hours  labetalol 200 milliGRAM(s) Enteral Tube every 8 hours  pantoprazole  Injectable 40 milliGRAM(s) IV Push daily  petrolatum Ophthalmic Ointment 1 Application(s) Both EYES two times a day  sodium chloride 7% Inhalation 4 milliLiter(s) Inhalation every 12 hours    IVF:  lactated ringers. 1000 milliLiter(s) IV Continuous <Continuous>    CULTURES:  Culture Results:   Numerous Staphylococcus aureus  Normal Respiratory Miranda present (10-01 @ 19:54)  Culture Results:   No growth at 72 Hours (10-01 @ 17:46)    RADIOLOGY & ADDITIONAL TESTS:    AMS    Handoff    Acute myocardial infarction    CVA (cerebral vascular accident)    Hemorrhagic stroke    Brainstem stroke    Encephalopathy acute    Functional quadriplegia    Advanced care planning/counseling discussion    Encounter for palliative care    [facility]    [email or web address]        ASSESSMENT:  43 y/o PMH ?stroke/MI present to [facility] after collapsing at work. Decorticate posturing, vomiting, intubated for airway protection. 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 Transferred to [facility] for further management    Neuro:  - neuro/vitals q1  - pain control: fentanyl 25q2 prn, tylenol  - CTH : enlarged pontine hemorrhage, CTH 10/3 done read stable   - vEEG (10/3-)-neg  - stroke core measures, stroke neuro following  - palliative and ethics following, attempting to establish HCP  - MRI brain w/ w/o contrast pending    CV:  - SBP<160  - HTN: norvasc 10mg, labetalol 200q8, hydralazine 25q8  - echo () EF 75%    Resp:  - Intubated, FVS  - duonebs/mucomyst/7% x 48hrs (10/4-10/6), SL atropine for secretions x 48 hrs  (10/4-10/6)    GI:  - TF via OGT, nepro  - bowel reg held for diarrhea, LBM 10/4  - protonix while intubated  - Hypernatremia:  q6hrs    Renal:  - LR@100cc/hr  - Na 150-155  - voiding  - trend BUN/Cr: possible CKD? renal consult day 10/4  - renal US 10/1: echogenicity c/w chronic med renal dz  - nephro following, recs appreciated    Endo:  - ISS  - a1c 5.4    Heme:  - DVT ppx: SCDs, SQH 5000q8    ID:  - pan cx 10/1  - MRSA swab negative, sputum MSSA + , s/p 1 dose vanc (10/2), CTX (10/2 - 10/4), Ancef (10/4- )     Dispo: NSICU, full code    D/w Dr. D'Amico, Dr. Reveles    Assessment:  Present when checked    []  GCS  E   V  M     Heart Failure: []Acute, [] acute on chronic , []chronic  Heart Failure:  [] Diastolic (HFpEF), [] Systolic (HFrEF), []Combined (HFpEF and HFrEF), [] RHF, [] Pulm HTN, [] Other    [] ANIKA, [] ATN, [] AIN, [] other  [] CKD1, [] CKD2, [] CKD 3, [] CKD 4, [] CKD 5, []ESRD    Encephalopathy: [] Metabolic, [] Hepatic, [] toxic, [] Neurological, [] Other    Abnormal Nurtitional Status: [] malnurtition (see nutrition note), [ ]underweight: BMI < 19, [] morbid obesity: BMI >40, [] Cachexia    [] Sepsis  [] hypovolemic shock,[] cardiogenic shock, [] hemorrhagic shock, [] neuogenic shock  [] Acute Respiratory Failure  []Cerebral edema, [] Brain compression/ herniation,   [] Functional quadriplegia  [] Acute blood loss anemia

## 2024-10-05 NOTE — PROGRESS NOTE ADULT - SUBJECTIVE AND OBJECTIVE BOX
NSCU Progress Note    Assessment/Hospital Course:        24 Hour Events/Subjective:  - ongoing goc,contacted son's number  - uptrending cr      REVIEW OF SYSTEMS:  - negative except as above    VITALS:   - reviewed      IMAGING/DATA:   - Reviewed          PHYSICAL EXAM:    General: intubated  CVS: RRR  Pulm: CTAB  GI: Soft, NTND  Extremities: No LE Edema  Neuro: intubated, AOx0, 2mm reactive pupils, ?vertical gaze in tact on command?, uppers extensor, BLE 0/5

## 2024-10-05 NOTE — PROGRESS NOTE ADULT - ASSESSMENT
43 y/o M not known to have any kidney disease, h/o HTN, DM, CAD  currently admitted for brain sterm hemorrhagic stroke due to HTN emergency since, intubated and ventilated.    Last 24h uop: 1.7L  Last 24h net balance: 2.3L   at goal    -Imp: Non oliguric iATN, stage 3 ANIKA.   sCr 3.4->4.8.     -Plan:   SBP(100-160) and Na(140-150) as per NeuroSx.   No indication for RRT at this point.   Strict I/O  Renal US  Repeat UA with microscopy, UPCR and U. Na on 10/6am.   Admin 500 ml of D5W @ 100ml/hr, iso Na 157. Increase FWF to 350 ml q4h.  Renally adjusted meds  Currently on Amlodipine 10, Hydral 25 TID, Labetalol 200 TID, if SBP>160 increase Hydral to 50mg TID.

## 2024-10-05 NOTE — PROGRESS NOTE ADULT - SUBJECTIVE AND OBJECTIVE BOX
Evaluated at bedside, critically ill worsening renal function. Hypertensive with SBP>160.     REVIEW OF SYSTEMS: Unable to obtain.     PHYSICAL EXAM:  Cards: S1/S2, RRR  Respiratory: CTABL  Abdomen: Soft, non- tender  Extremities: No edema  Neuro: intubated and sedated      VITALS:  T(F): 98.1 (10-05-24 @ 14:01), Max: 100.9 (10-04-24 @ 21:00)  HR: 77 (10-05-24 @ 15:00)  BP: 144/101 (10-05-24 @ 15:00)  RR: 17 (10-05-24 @ 15:00)  SpO2: 99% (10-05-24 @ 15:00)  Wt(kg): --    10-03 @ 07:01  -  10-04 @ 07:00  --------------------------------------------------------  IN: 3420 mL / OUT: 1625 mL / NET: 1795 mL    10-04 @ 07:01  -  10-05 @ 07:00  --------------------------------------------------------  IN: 4085 mL / OUT: 1750 mL / NET: 2335 mL    10-05 @ 07:01  -  10-05 @ 15:59  --------------------------------------------------------  IN: 1735 mL / OUT: 550 mL / NET: 1185 mL      LABS:  10-05    157[H]  |  122[H]  |  49[H]  ----------------------------<  137[H]  4.2   |  24  |  4.87[H]    Ca    7.9[L]      05 Oct 2024 05:30  Phos  5.0     10-05  Mg     2.2     10-05                            11.8   7.84  )-----------( 203      ( 05 Oct 2024 05:30 )             39.2       acetaminophen   Oral Liquid .. 650 milliGRAM(s) Oral every 6 hours PRN  acetylcysteine 10%  Inhalation 4 milliLiter(s) Inhalation every 6 hours  albuterol/ipratropium for Nebulization 3 milliLiter(s) Nebulizer every 6 hours  amLODIPine   Tablet 10 milliGRAM(s) Oral daily  atropine 1% Ophthalmic Solution for SubLingual Use 2 Drop(s) SubLingual every 8 hours  ceFAZolin   IVPB 1000 milliGRAM(s) IV Intermittent every 12 hours  chlorhexidine 0.12% Liquid 15 milliLiter(s) Oral Mucosa every 12 hours  chlorhexidine 2% Cloths 1 Application(s) Topical <User Schedule>  fentaNYL    Injectable 25 MICROGram(s) IV Push every 2 hours PRN  heparin   Injectable 5000 Unit(s) SubCutaneous every 8 hours  hydrALAZINE 25 milliGRAM(s) Oral every 8 hours  labetalol 200 milliGRAM(s) Enteral Tube every 8 hours  lactated ringers. 1000 milliLiter(s) IV Continuous <Continuous>  pantoprazole  Injectable 40 milliGRAM(s) IV Push daily  petrolatum Ophthalmic Ointment 1 Application(s) Both EYES two times a day  sodium chloride 7% Inhalation 4 milliLiter(s) Inhalation every 12 hours

## 2024-10-05 NOTE — PROGRESS NOTE ADULT - ASSESSMENT
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 CPAP as tolerated  ABG, CXR  VAP bundle  ABG    CV:  SBP goal 100-160  labetalol 200mg tid  amlodipine 10  Katt      RENAL:   ANIKA though suspect CKD  Fluids: LR at 80cc/hr  Vuong catheter in place; monitor Is/Os  Na goal 140-150  BMPq12  Nephrology following  [id number]  abg daily    GI:  Diet: TF  GI prophylaxis [] not indicated [x] PPI [] other:  Bowel regimen [] colace [x] senna [] other: miralax  Monitor LFTs    ENDO:   Goal euglycemia (-180)      HEME/ONC:  VTE prophylaxis: [x] SCDs [x] chemoprophylaxis       ID:   Mild leukocytosis  f/u pan culture  CTX (10/2 - ) x7d  MRSA swab neg   ASSESSMENT/PLAN:  71 y/o M with 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 CPAP as tolerated  ABG, CXR  VAP bundle  ABG    CV:  SBP goal 100-160  labetalol 200mg tid  amlodipine 10  Katt      RENAL:   ANIKA though suspect CKD  Fluids: LR at 80cc/hr  Vuong catheter in place; monitor Is/Os  Na goal 140-150  BMPq12  Nephrology following  [id number]  abg daily    GI:  Diet: TF  GI prophylaxis [] not indicated [x] PPI [] other:  Bowel regimen [] colace [x] senna [] other: miralax  Monitor LFTs    ENDO:   Goal euglycemia (-180)      HEME/ONC:  VTE prophylaxis: [x] SCDs [x] chemoprophylaxis       ID:   Mild leukocytosis  f/u pan culture  Ancef (10/2 - ) x7d  MRSA swab neg

## 2024-10-05 NOTE — PROGRESS NOTE ADULT - SUBJECTIVE AND OBJECTIVE BOX
[facility] [place] AD[facility] [place] [place]    Nighttime rounds were performed     Patient is a 45 y/o male with unknown past medical history (reported stroke and MI by coworkers). He presented to [facility] with AMS. Prior to this, pt was working at [employer] when he was found down by coworkers. EMS called and pt brought to [facility] ED. Intubated, sedated, started on cardene for SBPs in 200s. CT head showed pontine bleed. Transferred to NSICU for further management.  (30 Sep 2024 12:55)    On admission, the patient was:  GCS:   Cash-Shane:  modified Robles:  ICH score: 3  NIHSS:    *** HIGH RISK OF DVT PRESENT ON ADMISSION ***      ICU Vital Signs Last 24 Hrs  T(C): 36.7 (05 Oct 2024 17:43), Max: 38.3 (04 Oct 2024 21:00)  T(F): 98.1 (05 Oct 2024 17:43), Max: 100.9 (04 Oct 2024 21:00)  HR: 81 (05 Oct 2024 19:00) (74 - 94)  BP: 158/107 (05 Oct 2024 19:00) (131/76 - 172/100)  BP(mean): 128 (05 Oct 2024 19:00) (96 - 128)  ABP: 167/95 (05 Oct 2024 19:00) (112/93 - 181/127)  ABP(mean): 121 (05 Oct 2024 19:00) (104 - 148)  RR: 12 (05 Oct 2024 19:00) (12 - 18)  SpO2: 99% (05 Oct 2024 19:00) (96% - 100%)      10-04-24 @ 07:01  -  10-05-24 @ 07:00  --------------------------------------------------------  IN: 4085 mL / OUT: 1750 mL / NET: 2335 mL    10-05-24 @ 07:01  -  10-05-24 @ 19:33  --------------------------------------------------------  IN: 2725 mL / OUT: 875 mL / NET: 1850 mL      Mode: AC/ CMV (Assist Control/ Continuous Mandatory Ventilation), RR (machine): 12, TV (machine): 430, FiO2: 50, PEEP: 6, ITime: 1, MAP: 9, PIP: 17      EXAMINATION:  General: Not on sedation  HEENT:  MMM, ETT  Neuro: Patient not on sedation, pupils 3mm and reactive, cough/gag, corneals reflexes present on L, not on right,   RUE trace withdraws, LUE extends  RLE brisk TF, LLE  TF  ? following commands to look down at his toes when asked at times. Not often reproducible  Cards: S1/S2, RRR  Respiratory: Clear, no wheeze  Abdomen: Soft, non- tender  Extremities: No edema  Skin: Warm/dry      MEDICATIONS:  acetaminophen   Oral Liquid .. 650 milliGRAM(s) Oral every 6 hours PRN  acetylcysteine 10%  Inhalation 4 milliLiter(s) Inhalation every 6 hours  albuterol/ipratropium for Nebulization 3 milliLiter(s) Nebulizer every 6 hours  amLODIPine   Tablet 10 milliGRAM(s) Oral daily  atropine 1% Ophthalmic Solution for SubLingual Use 2 Drop(s) SubLingual every 8 hours  ceFAZolin   IVPB 1000 milliGRAM(s) IV Intermittent every 12 hours  chlorhexidine 0.12% Liquid 15 milliLiter(s) Oral Mucosa every 12 hours  chlorhexidine 2% Cloths 1 Application(s) Topical <User Schedule>  fentaNYL    Injectable 25 MICROGram(s) IV Push every 2 hours PRN  heparin   Injectable 5000 Unit(s) SubCutaneous every 8 hours  hydrALAZINE 25 milliGRAM(s) Oral every 8 hours  labetalol 200 milliGRAM(s) Enteral Tube every 8 hours  lactated ringers. 1000 milliLiter(s) (100 mL/Hr) IV Continuous <Continuous>  pantoprazole  Injectable 40 milliGRAM(s) IV Push daily  petrolatum Ophthalmic Ointment 1 Application(s) Both EYES two times a day  sodium chloride 7% Inhalation 4 milliLiter(s) Inhalation every 12 hours      LABS:                      11.8   7.84  )-----------( 203      ( 05 Oct 2024 05:30 )             39.2     10-05    157[H]  |  122[H]  |  49[H]  ----------------------------<  137[H]  4.2   |  24  |  4.87[H]    Ca    7.9[L]      05 Oct 2024 05:30  Phos  5.0     10-05  Mg     2.2     10-05      ABG - ( 05 Oct 2024 13:05 )  pH, Arterial: 7.39  pH, Blood: x     /  pCO2: 37    /  pO2: 102   / HCO3: 22    / Base Excess: -2.2  /  SaO2: 98.3          ASSESSMENT/PLAN:  71 y/o M with 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 Monitor**    PULM: Intubated  Full vent support. CPAP as tolerated  ABG, CXR  VAP bundle. Pulm toilet. On nebs (3%, duonebs, mucomyst)    CV:  SBP goal 100-160  On amlodipine, hydralazine and labetalol  TTE:    RENAL: CKD; ANIKA component. Non oliguric  Fluids: LR at 100cc/hr. Monitor Is/Os  Renal US reviewed. No hydronephrosis. Demonstrates chronic medical renal disease  Na goal 145-155.  Free water flushes  Renal following    GI:  Diet: TFs; nepro  GI prophylaxis [] not indicated [x] PPI [] other:  Bowel regimen [] colace [x] senna [] other: miralax  LBM: 10/3; diarrhea    ENDO:   Goal euglycemia (-180)    HEME/ONC:  VTE prophylaxis: [x] SCDs [x] SQH    ID: Febrile, MSSA pneumonia  Cultures:  Sputum: Staph aureus  Blood cultures NGTD  Procalcitonin:   MRSA nares: MSSA positive  Antibiotics: Ancef    Palliative care and Ethics following    Continue care per daytime intensivist Mario Alberto HOUSTON    Critical care time: 45 minutes         [facility] ADDITIONAL PROGRESS NOTE    Nighttime rounds were performed     Patient is a 43 y/o male with unknown past medical history (reported stroke and MI by coworkers). He presented to [facility] with AMS. Prior to this, pt was working at [employer] when he was found down by coworkers. EMS called and pt brought to [facility] ED. Intubated, sedated, started on cardene for SBPs in 200s. CT head showed pontine bleed. Transferred to NSICU for further management.  (30 Sep 2024 12:55)    On admission, the patient was:  GCS:   Cash-Shane:  modified Robles:  ICH score: 3  NIHSS:    *** HIGH RISK OF DVT PRESENT ON ADMISSION ***      ICU Vital Signs Last 24 Hrs  T(C): 36.7 (05 Oct 2024 17:43), Max: 38.3 (04 Oct 2024 21:00)  T(F): 98.1 (05 Oct 2024 17:43), Max: 100.9 (04 Oct 2024 21:00)  HR: 81 (05 Oct 2024 19:00) (74 - 94)  BP: 158/107 (05 Oct 2024 19:00) (131/76 - 172/100)  BP(mean): 128 (05 Oct 2024 19:00) (96 - 128)  ABP: 167/95 (05 Oct 2024 19:00) (112/93 - 181/127)  ABP(mean): 121 (05 Oct 2024 19:00) (104 - 148)  RR: 12 (05 Oct 2024 19:00) (12 - 18)  SpO2: 99% (05 Oct 2024 19:00) (96% - 100%)      10-04-24 @ 07:01  -  10-05-24 @ 07:00  --------------------------------------------------------  IN: 4085 mL / OUT: 1750 mL / NET: 2335 mL    10-05-24 @ 07:01  -  10-05-24 @ 19:33  --------------------------------------------------------  IN: 2725 mL / OUT: 875 mL / NET: 1850 mL      Mode: AC/ CMV (Assist Control/ Continuous Mandatory Ventilation), RR (machine): 12, TV (machine): 430, FiO2: 50, PEEP: 6, ITime: 1, MAP: 9, PIP: 17      EXAMINATION:  General: Not on sedation  HEENT:  MMM, ETT  Neuro: Patient not on sedation, pupils 3mm and reactive, cough/gag, corneals reflexes present on L, not on right,   B/L upper extend  RLE brisk TF, LLE  TF  ? following commands to look down at his toes when asked at times. Not often reproducible  Cards: S1/S2, RRR  Respiratory: Clear, no wheeze  Abdomen: Soft, non- tender  Extremities: No edema  Skin: Warm/dry      MEDICATIONS:  acetaminophen   Oral Liquid .. 650 milliGRAM(s) Oral every 6 hours PRN  acetylcysteine 10%  Inhalation 4 milliLiter(s) Inhalation every 6 hours  albuterol/ipratropium for Nebulization 3 milliLiter(s) Nebulizer every 6 hours  amLODIPine   Tablet 10 milliGRAM(s) Oral daily  atropine 1% Ophthalmic Solution for SubLingual Use 2 Drop(s) SubLingual every 8 hours  ceFAZolin   IVPB 1000 milliGRAM(s) IV Intermittent every 12 hours  chlorhexidine 0.12% Liquid 15 milliLiter(s) Oral Mucosa every 12 hours  chlorhexidine 2% Cloths 1 Application(s) Topical <User Schedule>  fentaNYL    Injectable 25 MICROGram(s) IV Push every 2 hours PRN  heparin   Injectable 5000 Unit(s) SubCutaneous every 8 hours  hydrALAZINE 25 milliGRAM(s) Oral every 8 hours  labetalol 200 milliGRAM(s) Enteral Tube every 8 hours  lactated ringers. 1000 milliLiter(s) (100 mL/Hr) IV Continuous <Continuous>  pantoprazole  Injectable 40 milliGRAM(s) IV Push daily  petrolatum Ophthalmic Ointment 1 Application(s) Both EYES two times a day  sodium chloride 7% Inhalation 4 milliLiter(s) Inhalation every 12 hours      LABS:                      11.8   7.84  )-----------( 203      ( 05 Oct 2024 05:30 )             39.2     10-05    157[H]  |  122[H]  |  49[H]  ----------------------------<  137[H]  4.2   |  24  |  4.87[H]    Ca    7.9[L]      05 Oct 2024 05:30  Phos  5.0     10-05  Mg     2.2     10-05      ABG - ( 05 Oct 2024 13:05 )  pH, Arterial: 7.39  pH, Blood: x     /  pCO2: 37    /  pO2: 102   / HCO3: 22    / Base Excess: -2.2  /  SaO2: 98.3          ASSESSMENT/PLAN:  73 y/o M with 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 Negative for seizures. DCed.    PULM: Intubated  Full vent support. CPAP as tolerated  ABG, CXR  VAP bundle. Pulm toilet. On nebs (3%, duonebs, mucomyst)    CV:  SBP goal 100-160  On amlodipine, hydralazine and labetalol  TTE: EF 75%    RENAL: CKD; ANIKA component. Non oliguric  Fluids: LR at 100cc/hr. Monitor Is/Os  Renal US reviewed. No hydronephrosis. Demonstrates chronic medical renal disease  Na goal 145-155.  Free water flushes 350 Q4  Renal following    GI:  Diet: TFs; nepro  GI prophylaxis [] not indicated [x] PPI [] other:  Bowel regimen [] colace [x] senna [] other: miralax  LBM: 10/3; diarrhea; rectal tube in place    ENDO:   Goal euglycemia (-180)    HEME/ONC:  VTE prophylaxis: [x] SCDs [x] SQH    ID: Febrile, MSSA pneumonia  Cultures:  Sputum: Staph aureus  Blood cultures NGTD  MRSA nares: MSSA positive  Antibiotics: Ancef    Palliative care and Ethics following    Continue care per daytime intensivist Mario Alberto HOUSTON    Critical care time: 45 minutes

## 2024-10-06 LAB
ANION GAP SERPL CALC-SCNC: 12 MMOL/L — SIGNIFICANT CHANGE UP (ref 5–17)
BASE EXCESS BLDA CALC-SCNC: 0.3 MMOL/L — SIGNIFICANT CHANGE UP (ref -2–3)
BUN SERPL-MCNC: 46 MG/DL — HIGH (ref 7–23)
CALCIUM SERPL-MCNC: 8 MG/DL — LOW (ref 8.4–10.5)
CHLORIDE SERPL-SCNC: 118 MMOL/L — HIGH (ref 96–108)
CO2 BLDA-SCNC: 26 MMOL/L — HIGH (ref 19–24)
CO2 SERPL-SCNC: 24 MMOL/L — SIGNIFICANT CHANGE UP (ref 22–31)
CREAT SERPL-MCNC: 4.96 MG/DL — HIGH (ref 0.5–1.3)
EGFR: 14 ML/MIN/1.73M2 — LOW
EGFR: 14 ML/MIN/1.73M2 — LOW
GLUCOSE SERPL-MCNC: 107 MG/DL — HIGH (ref 70–99)
HCO3 BLDA-SCNC: 25 MMOL/L — SIGNIFICANT CHANGE UP (ref 21–28)
HCT VFR BLD CALC: 36.5 % — LOW (ref 39–50)
HGB BLD-MCNC: 11.2 G/DL — LOW (ref 13–17)
LACTATE SERPL-SCNC: 0.7 MMOL/L — SIGNIFICANT CHANGE UP (ref 0.5–2)
MAGNESIUM SERPL-MCNC: 2.2 MG/DL — SIGNIFICANT CHANGE UP (ref 1.6–2.6)
MCHC RBC-ENTMCNC: 28.2 PG — SIGNIFICANT CHANGE UP (ref 27–34)
MCHC RBC-ENTMCNC: 30.7 GM/DL — LOW (ref 32–36)
MCV RBC AUTO: 91.9 FL — SIGNIFICANT CHANGE UP (ref 80–100)
NRBC # BLD: 0 /100 WBCS — SIGNIFICANT CHANGE UP (ref 0–0)
NRBC BLD-RTO: 0 /100 WBCS — SIGNIFICANT CHANGE UP (ref 0–0)
PCO2 BLDA: 38 MMHG — SIGNIFICANT CHANGE UP (ref 35–48)
PH BLDA: 7.42 — SIGNIFICANT CHANGE UP (ref 7.35–7.45)
PHOSPHATE SERPL-MCNC: 4.4 MG/DL — SIGNIFICANT CHANGE UP (ref 2.5–4.5)
PLATELET # BLD AUTO: 203 K/UL — SIGNIFICANT CHANGE UP (ref 150–400)
PO2 BLDA: 115 MMHG — HIGH (ref 83–108)
POTASSIUM SERPL-MCNC: 4 MMOL/L — SIGNIFICANT CHANGE UP (ref 3.5–5.3)
POTASSIUM SERPL-SCNC: 4 MMOL/L — SIGNIFICANT CHANGE UP (ref 3.5–5.3)
RBC # BLD: 3.97 M/UL — LOW (ref 4.2–5.8)
RBC # FLD: 13.3 % — SIGNIFICANT CHANGE UP (ref 10.3–14.5)
SAO2 % BLDA: 99.1 % — HIGH (ref 94–98)
SODIUM SERPL-SCNC: 154 MMOL/L — HIGH (ref 135–145)
WBC # BLD: 7.13 K/UL — SIGNIFICANT CHANGE UP (ref 3.8–10.5)
WBC # FLD AUTO: 7.13 K/UL — SIGNIFICANT CHANGE UP (ref 3.8–10.5)

## 2024-10-06 PROCEDURE — 99291 CRITICAL CARE FIRST HOUR: CPT

## 2024-10-06 PROCEDURE — 71045 X-RAY EXAM CHEST 1 VIEW: CPT | Mod: 26

## 2024-10-06 RX ORDER — ACETAMINOPHEN 500 MG/5ML
1000 LIQUID (ML) ORAL ONCE
Refills: 0 | Status: COMPLETED | OUTPATIENT
Start: 2024-10-06 | End: 2024-10-06

## 2024-10-06 RX ADMIN — SODIUM CHLORIDE 100 MILLILITER(S): 9 INJECTION, SOLUTION INTRAVENOUS at 18:16

## 2024-10-06 RX ADMIN — Medication 25 MICROGRAM(S): at 14:20

## 2024-10-06 RX ADMIN — LABETALOL HYDROCHLORIDE 200 MILLIGRAM(S): 200 TABLET, FILM COATED ORAL at 06:17

## 2024-10-06 RX ADMIN — Medication 40 MILLIGRAM(S): at 12:33

## 2024-10-06 RX ADMIN — HEPARIN SODIUM 5000 UNIT(S): 1000 INJECTION INTRAVENOUS; SUBCUTANEOUS at 13:23

## 2024-10-06 RX ADMIN — Medication 100 MILLIGRAM(S): at 22:31

## 2024-10-06 RX ADMIN — Medication 650 MILLIGRAM(S): at 07:55

## 2024-10-06 RX ADMIN — Medication 2 DROP(S): at 06:16

## 2024-10-06 RX ADMIN — Medication 15 MILLILITER(S): at 18:05

## 2024-10-06 RX ADMIN — ACETYLCYSTEINE 4 MILLILITER(S): 200 INHALANT RESPIRATORY (INHALATION) at 06:08

## 2024-10-06 RX ADMIN — Medication 650 MILLIGRAM(S): at 08:30

## 2024-10-06 RX ADMIN — IPRATROPIUM BROMIDE AND ALBUTEROL SULFATE 3 MILLILITER(S): .5; 2.5 SOLUTION RESPIRATORY (INHALATION) at 09:08

## 2024-10-06 RX ADMIN — Medication 25 MICROGRAM(S): at 14:06

## 2024-10-06 RX ADMIN — ACETYLCYSTEINE 4 MILLILITER(S): 200 INHALANT RESPIRATORY (INHALATION) at 09:09

## 2024-10-06 RX ADMIN — LABETALOL HYDROCHLORIDE 200 MILLIGRAM(S): 200 TABLET, FILM COATED ORAL at 13:23

## 2024-10-06 RX ADMIN — Medication 25 MILLIGRAM(S): at 13:22

## 2024-10-06 RX ADMIN — Medication 25 MICROGRAM(S): at 16:25

## 2024-10-06 RX ADMIN — IPRATROPIUM BROMIDE AND ALBUTEROL SULFATE 3 MILLILITER(S): .5; 2.5 SOLUTION RESPIRATORY (INHALATION) at 06:09

## 2024-10-06 RX ADMIN — Medication 1000 MILLIGRAM(S): at 18:40

## 2024-10-06 RX ADMIN — Medication 1 APPLICATION(S): at 18:07

## 2024-10-06 RX ADMIN — IPRATROPIUM BROMIDE AND ALBUTEROL SULFATE 3 MILLILITER(S): .5; 2.5 SOLUTION RESPIRATORY (INHALATION) at 15:51

## 2024-10-06 RX ADMIN — Medication 25 MICROGRAM(S): at 16:07

## 2024-10-06 RX ADMIN — LABETALOL HYDROCHLORIDE 200 MILLIGRAM(S): 200 TABLET, FILM COATED ORAL at 22:30

## 2024-10-06 RX ADMIN — AMLODIPINE BESYLATE 10 MILLIGRAM(S): 10 TABLET ORAL at 06:16

## 2024-10-06 RX ADMIN — Medication 2 DROP(S): at 00:12

## 2024-10-06 RX ADMIN — HEPARIN SODIUM 5000 UNIT(S): 1000 INJECTION INTRAVENOUS; SUBCUTANEOUS at 06:17

## 2024-10-06 RX ADMIN — ACETYLCYSTEINE 4 MILLILITER(S): 200 INHALANT RESPIRATORY (INHALATION) at 15:51

## 2024-10-06 RX ADMIN — Medication 100 MILLIGRAM(S): at 09:29

## 2024-10-06 RX ADMIN — Medication 1 APPLICATION(S): at 06:28

## 2024-10-06 RX ADMIN — Medication 15 MILLILITER(S): at 06:16

## 2024-10-06 RX ADMIN — Medication 25 MILLIGRAM(S): at 22:31

## 2024-10-06 RX ADMIN — Medication 25 MILLIGRAM(S): at 06:16

## 2024-10-06 RX ADMIN — Medication 400 MILLIGRAM(S): at 18:11

## 2024-10-06 RX ADMIN — HEPARIN SODIUM 5000 UNIT(S): 1000 INJECTION INTRAVENOUS; SUBCUTANEOUS at 22:31

## 2024-10-06 RX ADMIN — Medication 4 MILLILITER(S): at 06:08

## 2024-10-06 NOTE — PROGRESS NOTE ADULT - ASSESSMENT
41 y/o M not known to have any kidney disease, h/o HTN, DM, CAD  currently admitted for brain sterm hemorrhagic stroke due to HTN emergency since, intubated and ventilated.    Last 24h uop: 1.8L  Last 48h net balance: 5.3L    -Imp: Non oliguric iATN, stage 3 ANIKA.   sCr 3.4->4.8->4.9 now    -Plan:   SBP(100-160) and Na(140-150) as per NeuroSx. Now SBP ~170s and HR 90s, increase Labetalol to 400mg TID.   Cont. Amlodipine 10mg, Hydral 25mg TID.  Na goal 140-150 as per NeuroSx. Calculated FWD for Na 145meq is ~3L.  Cont.  ml q4h, admin 1L of D5W @ 100ml/hr, once.   No indication for RRT at this point.   Strict I/O  Obtain Renal US  Repeat UA with microscopy, UPCR and U. Na today.   Renally adjusted meds

## 2024-10-06 NOTE — PROGRESS NOTE ADULT - SUBJECTIVE AND OBJECTIVE BOX
[facility] [place] [facility] ATTENDING -- ADDITIONAL PROGRESS NOTE    Nighttime rounds were performed     Patient is a 43 y/o male with unknown past medical history (reported stroke and MI by coworkers). He presented to [facility] with AMS. Prior to this, pt was working at "[employer]" when he was found down by coworkers. EMS called and pt brought to [facility] ED. Intubated, sedated, started on cardene for SBPs in 200s. CT head showed pontine bleed. Transferred to NSICU for further management.  (30 Sep 2024 12:55)    On admission, the patient was:  GCS:   Cash-Shane:  modified Robles:  ICH score: 3  NIHSS:    *** HIGH RISK OF DVT PRESENT ON ADMISSION ***      ICU Vital Signs Last 24 Hrs  T(C): 37.8 (06 Oct 2024 17:36), Max: 38.1 (06 Oct 2024 09:19)  T(F): 100 (06 Oct 2024 17:36), Max: 100.6 (06 Oct 2024 09:19)  HR: 96 (06 Oct 2024 19:00) (80 - 105)  BP: 148/90 (06 Oct 2024 19:00) (138/89 - 187/118)  BP(mean): 113 (06 Oct 2024 19:00) (111 - 144)  ABP: 211/144 (06 Oct 2024 19:00) (150/132 - 251/197)  ABP(mean): 165 (06 Oct 2024 19:00) (124 - 213)  RR: 16 (06 Oct 2024 19:00) (14 - 20)  SpO2: 96% (06 Oct 2024 19:00) (94% - 99%)      10-05-24 @ 07:01  -  10-06-24 @ 07:00  --------------------------------------------------------  IN: 5185 mL / OUT: 2175 mL / NET: 3010 mL    10-06-24 @ 07:01  -  10-06-24 @ 19:51  --------------------------------------------------------  IN: 2610 mL / OUT: 1375 mL / NET: 1235 mL      Mode: V500, RR (machine): 12, TV (machine): 430, FiO2: 50, PEEP: 6, ITime: 1, MAP: 7, PIP: 16      EXAMINATION:  General: Not on sedation  HEENT:  MMM, ETT  Neuro: Patient not on sedation, pupils 3mm and reactive, cough/gag, corneals reflexes present on L, not on right,   B/L upper extend  RLE brisk TF, LLE  TF  ? following commands to look down at his toes when asked at times. Not consistently reproducible  Cards: S1/S2, RRR  Respiratory: Clear, no wheeze  Abdomen: Soft, non- tender  Extremities: No edema  Skin: Warm/dry      MEDICATIONS:  acetaminophen   Oral Liquid .. 650 milliGRAM(s) Oral every 6 hours PRN  acetylcysteine 10%  Inhalation 4 milliLiter(s) Inhalation every 6 hours  albuterol/ipratropium for Nebulization 3 milliLiter(s) Nebulizer every 6 hours  amLODIPine   Tablet 10 milliGRAM(s) Oral daily  ceFAZolin   IVPB 1000 milliGRAM(s) IV Intermittent every 12 hours  chlorhexidine 0.12% Liquid 15 milliLiter(s) Oral Mucosa every 12 hours  chlorhexidine 2% Cloths 1 Application(s) Topical <User Schedule>  fentaNYL    Injectable 25 MICROGram(s) IV Push every 2 hours PRN  heparin   Injectable 5000 Unit(s) SubCutaneous every 8 hours  hydrALAZINE 25 milliGRAM(s) Oral every 8 hours  labetalol 200 milliGRAM(s) Enteral Tube every 8 hours  lactated ringers. 1000 milliLiter(s) (100 mL/Hr) IV Continuous <Continuous>  pantoprazole  Injectable 40 milliGRAM(s) IV Push daily  petrolatum Ophthalmic Ointment 1 Application(s) Both EYES two times a day      LABS:                     11.2   7.13  )-----------( 203      ( 06 Oct 2024 05:19 )             36.5     10-06    154[H]  |  118[H]  |  46[H]  ----------------------------<  107[H]  4.0   |  24  |  4.96[H]    Ca    8.0[L]      06 Oct 2024 05:19  Phos  4.4     10-06  Mg     2.2     10-06        ABG - ( 06 Oct 2024 05:18 )  pH, Arterial: 7.42  pH, Blood: x     /  pCO2: 38    /  pO2: 115   / HCO3: 25    / Base Excess: 0.3   /  SaO2: 99.1          ASSESSMENT/PLAN:  71 y/o M with 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 Negative for seizures. DCed.    PULM: Intubated  Full vent support. CPAP as tolerated  ABG, CXR  VAP bundle. Pulm toilet. On nebs (3%, duonebs, mucomyst)    CV:  SBP goal 100-160  On amlodipine, hydralazine and labetalol  TTE: EF 75%    RENAL: CKD; ANIKA component. Non oliguric  Fluids: LR at 100cc/hr. Monitor Is/Os  Renal US reviewed. No hydronephrosis. Demonstrates chronic medical renal disease  Na goal 145-155.  Free water flushes 350 Q4  Renal following    GI:  Diet: TFs; nepro  GI prophylaxis [] not indicated [x] PPI [] other:  Bowel regimen [] colace [x] senna [] other: miralax  LBM: 10/3; diarrhea; rectal tube in place**    ENDO:   Goal euglycemia (-180)    HEME/ONC:  VTE prophylaxis: [x] SCDs [x] SQH    ID: Febrile, MSSA pneumonia  Cultures:  Sputum: Staph aureus  Blood cultures NGTD  MRSA nares: MSSA positive  Antibiotics: Ancef    Palliative care and Ethics following    Continue care per daytime intensivist Mario Alberto HOUSTON    Critical care time: 45 minutes         [facility]DITIONAL PROGRESS NOTE    Nighttime rounds were performed     Patient is a 43 y/o male with unknown past medical history (reported stroke and MI by coworkers). He presented to [facility] with AMS. Prior to this, pt was working at [employer] when he was found down by coworkers. EMS called and pt brought to [facility] ED. Intubated, sedated, started on cardene for SBPs in 200s. CT head showed pontine bleed. Transferred to NSICU for further management.  (30 Sep 2024 12:55)    On admission, the patient was:  GCS:   Cash-Shane:  modified Robles:  ICH score: 3  NIHSS:    *** HIGH RISK OF DVT PRESENT ON ADMISSION ***      ICU Vital Signs Last 24 Hrs  T(C): 37.8 (06 Oct 2024 17:36), Max: 38.1 (06 Oct 2024 09:19)  T(F): 100 (06 Oct 2024 17:36), Max: 100.6 (06 Oct 2024 09:19)  HR: 96 (06 Oct 2024 19:00) (80 - 105)  BP: 148/90 (06 Oct 2024 19:00) (138/89 - 187/118)  BP(mean): 113 (06 Oct 2024 19:00) (111 - 144)  ABP: 211/144 (06 Oct 2024 19:00) (150/132 - 251/197)  ABP(mean): 165 (06 Oct 2024 19:00) (124 - 213)  RR: 16 (06 Oct 2024 19:00) (14 - 20)  SpO2: 96% (06 Oct 2024 19:00) (94% - 99%)      10-05-24 @ 07:01  -  10-06-24 @ 07:00  --------------------------------------------------------  IN: 5185 mL / OUT: 2175 mL / NET: 3010 mL    10-06-24 @ 07:01  -  10-06-24 @ 19:51  --------------------------------------------------------  IN: 2610 mL / OUT: 1375 mL / NET: 1235 mL      Mode: V500, RR (machine): 12, TV (machine): 430, FiO2: 50, PEEP: 6, ITime: 1, MAP: 7, PIP: 16      EXAMINATION:  General: Not on sedation  HEENT:  MMM, ETT  Neuro: Patient not on sedation, pupils 3mm and reactive, cough/gag, corneals reflexes present on L, not on right,   B/L upper extends  RLE brisk TF, LLE  TF  ? following commands to look down at his toes when asked at times. Not consistently reproducible  Cards: S1/S2, RRR  Respiratory: Clear, no wheeze  Abdomen: Soft, non- tender  Extremities: No edema  Skin: Warm/dry      MEDICATIONS:  acetaminophen   Oral Liquid .. 650 milliGRAM(s) Oral every 6 hours PRN  acetylcysteine 10%  Inhalation 4 milliLiter(s) Inhalation every 6 hours  albuterol/ipratropium for Nebulization 3 milliLiter(s) Nebulizer every 6 hours  amLODIPine   Tablet 10 milliGRAM(s) Oral daily  ceFAZolin   IVPB 1000 milliGRAM(s) IV Intermittent every 12 hours  chlorhexidine 0.12% Liquid 15 milliLiter(s) Oral Mucosa every 12 hours  chlorhexidine 2% Cloths 1 Application(s) Topical <User Schedule>  fentaNYL    Injectable 25 MICROGram(s) IV Push every 2 hours PRN  heparin   Injectable 5000 Unit(s) SubCutaneous every 8 hours  hydrALAZINE 25 milliGRAM(s) Oral every 8 hours  labetalol 200 milliGRAM(s) Enteral Tube every 8 hours  lactated ringers. 1000 milliLiter(s) (100 mL/Hr) IV Continuous <Continuous>  pantoprazole  Injectable 40 milliGRAM(s) IV Push daily  petrolatum Ophthalmic Ointment 1 Application(s) Both EYES two times a day      LABS:                     11.2   7.13  )-----------( 203      ( 06 Oct 2024 05:19 )             36.5     10-06    154[H]  |  118[H]  |  46[H]  ----------------------------<  107[H]  4.0   |  24  |  4.96[H]    Ca    8.0[L]      06 Oct 2024 05:19  Phos  4.4     10-06  Mg     2.2     10-06        ABG - ( 06 Oct 2024 05:18 )  pH, Arterial: 7.42  pH, Blood: x     /  pCO2: 38    /  pO2: 115   / HCO3: 25    / Base Excess: 0.3   /  SaO2: 99.1        ASSESSMENT/PLAN:  71 y/o M with 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 Negative for seizures. DCed.    PULM: Intubated  Full vent support. CPAP as tolerated  ABG, CXR  VAP bundle. Pulm toilet. On nebs (duonebs, mucomyst)    CV:  SBP goal 100-160  On amlodipine, hydralazine and labetalol   TTE: EF 75%    RENAL: CKD; ANIKA component. Non oliguric  Fluids: LR at 100cc/hr. Monitor Is/Os  Renal US reviewed. No hydronephrosis. Demonstrates chronic medical renal disease  Na goal 145-155.  Free water flushes 350 Q4  Renal following    GI:  Diet: TFs; nepro  GI prophylaxis [] not indicated [x] PPI [] other:  LBM: 10/6; diarrhea; rectal tube in place. Banatrol. Hold bowel regimen    ENDO:   Goal euglycemia (-180)    HEME/ONC:  VTE prophylaxis: [x] SCDs [x] SQH    ID: Febrile, MSSA pneumonia  Cultures:  Sputum: Staph aureus  Blood cultures NGTD  MRSA nares: MSSA positive  Antibiotics: Ancef    Palliative care and Ethics following    Continue care per daytime intensivist Mario Alberto HOUSTON    Critical care time: 45 minutes

## 2024-10-06 NOTE — PROGRESS NOTE ADULT - REASON FOR ADMISSION
Mary Baires MD  [facility] [place] [place] Physicians  [place] [address] [address]  [phone] office  [phone] fax      Patient ID: Sanjay Zacarias is a 74 y.o. female. Date:  2020      TELEHEALTH EVALUATION -- Audio/Visual (During NPBHO-30 public health emergency)    CC:   DM, HTN, HLD, GERD, low magnesium, vit D Def, anxiety, morbid obesity    HPI:    Sanjay Zacarias (:  1953) has requested an audio/video evaluation for the following concern(s):    Treatment Adherence:   Medication compliance:  noncompliant: ran out for a couple weeks, but now has medications from mail order pharmacy  Diet compliance:  compliant most of the time  Weight trend: stable  Current exercise: no regular exercise  Barriers: none    Diabetes Mellitus Type 2: Pt denies blurry vision, foot ulcerations, neuropathy, polyphagia, polyuria, polydipsia, nausea, vomiting and diarrhea. Home blood sugar records: patient does not test  Any episodes of hypoglycemia? no  Eye exam current (within one year):  Scheduled   Tobacco history: She  reports that she has never smoked. She has never used smokeless tobacco.   Daily Aspirin? Yes    Hypertension:  Home blood pressure monitoring: No.  She is adherent to a low sodium diet. Patient denies chest pain, shortness of breath, headache, lightheadedness, blurred vision, peripheral edema, palpitations, dry cough and fatigue. Antihypertensive medication side effects: no medication side effects noted. Use of agents associated with hypertension: none. Hyperlipidemia:  No new myalgias or GI upset on atorvastatin (Lipitor).        Lab Results   Component Value Date    LABA1C 6.7 2020    LABA1C 7.1 2019    LABA1C 6.8 2019     Lab Results   Component Value Date    LABMICR <1.20 2019    CREATININE 0.6 2020     Lab Results   Component Value Date    ALT 13 2020    AST 13 (L) 2020     Lab Results   Component Value Date CHOL 152 08/14/2020    TRIG 104 08/14/2020    HDL 44 08/14/2020    LDLCALC 87 08/14/2020    LDLDIRECT 152 (H) 08/25/2014          GERD (gastroesophageal reflux disease)/hypomagnesium  Well controlled on PPI. If she misses a pill, she has break through symptoms   Lab Results   Component Value Date    MG 1.90 08/14/2020           Vitamin d deficiency  Pt taking ergo twice a month.     Lab Results   Component Value Date    VITD25 49.1 08/14/2020       Anxiety   patient has missed many dosages lately of paxil of 20mg. Has noticed worsening anxiety lately. No depression sx's. Stopped doxepin secondary to cost.  Insomnia has been worse. Taking ativan daily. Review of Systems  Constitutional:  Negative for activity or appetite change, fever or fatigue  Resp:  Negative for SOB, chest tightness, cough  Cardiovascular: Negative for CP, palpitations, BOYKIN, orthopnea, PND, LE edema  Gastrointestinal: Negative for abd pain, melena, BRBPR, N/V/D  Endocrine:  Negative for polydipsia and polyuria  :  Negative for dysuria, flank pain or urinary frequency  Neuro:  Negative for dizziness or lightheadedness      Prior to Visit Medications    Medication Sig Taking? Authorizing Provider   triamcinolone (KENALOG) 0.1 % cream Apply topically 2 times daily. Yes Mirlande Cr MD   atorvastatin (LIPITOR) 20 MG tablet Take 1 tablet by mouth once a day  Mirlande Cr MD   losartan (COZAAR) 50 MG tablet Take 1 tablet by mouth daily  Mirlande Cr MD   metFORMIN (GLUCOPHAGE-XR) 500 MG extended release tablet Take 2 tablets daily with breakfast  Alisha Brody MD   LORazepam (ATIVAN) 1 MG tablet Take 1 tablet by mouth daily for 31 days.   Mirlande Cr MD   PARoxetine (PAXIL) 20 MG tablet One po qday  BILL Palacios CNP   omeprazole (PRILOSEC) 40 MG delayed release capsule TAKE 1 CAPSULE DAILY  BILL Palacios CNP   vitamin D (ERGOCALCIFEROL) 1.25 MG (67381 UT) CAPS capsule Take 1 capsule weekly  BILL Dawkins CNP   celecoxib (CELEBREX) 200 MG capsule TAKE 1 CAPSULE DAILY  BILL Scott CNP   doxepin (SINEQUAN) 50 MG capsule Take 2 capsules by mouth nightly  Wilmer Ortiz MD   methylPREDNISolone (MEDROL DOSEPACK) 4 MG tablet Take by mouth. Patient not taking: Reported on 2019  Megan Parisi MD   albuterol sulfate HFA (VENTOLIN HFA) 108 (90 Base) MCG/ACT inhaler Inhale 2 puffs into the lungs every 6 hours as needed for Wheezing  Megan Parisi MD   MAGNESIUM PO Take by mouth  Historical Provider, MD   Prenatal Multivit-Min-Fe-FA (PRE-PAUL FORMULA PO) Take  by mouth. Historical Provider, MD   ferrous sulfate 325 (65 FE) MG tablet Take 325 mg by mouth 2 times daily (with meals). Historical Provider, MD   aspirin 81 MG tablet Take 81 mg by mouth daily.   Historical Provider, MD       Social History     Tobacco Use    Smoking status: Never Smoker    Smokeless tobacco: Never Used   Substance Use Topics    Alcohol use: No    Drug use: No        Allergies   Allergen Reactions    Lisinopril      Dry cough   ,   Past Medical History:   Diagnosis Date    Allergic rhinitis     Anemia     Anxiety     takes on ativan dialy for 35 years and has never needed more    Asthma     Depression     Diabetes mellitus ([address]     Diabetic cataract ([facility]     Family history of glaucoma     GERD (gastroesophageal reflux disease)     Hiatal hernia     Hyperlipidemia     Hypertension     Hypomagnesemia     Morbid obesity ([address]     Obesity     Type II or unspecified type diabetes mellitus without mention of complication, not stated as uncontrolled     Vitamin D deficiency        PHYSICAL EXAMINATION:  [ INSTRUCTIONS:  \"[x]\" Indicates a positive item  \"[]\" Indicates a negative item  -- DELETE ALL ITEMS NOT EXAMINED]    Blood pressure- 117/82 Heart rate- 91   Respiratory rate- 18   (done at nurse visit on 20 for this visit)    Constitutional: [x] Appears well-developed and well-nourished [x] No apparent distress      [] Abnormal-   Mental status  [x] Alert and awake  [] Oriented to person/place/time []Able to follow commands      Eyes:  EOM    [x]  Normal  [] Abnormal-  Sclera  []  Normal  [] Abnormal -         Discharge []  None visible  [] Abnormal -    HENT:   [x] Normocephalic, atraumatic. [] Abnormal   [] Mouth/Throat: Mucous membranes are moist.     External Ears [x] Normal  [] Abnormal-     Neck: [x] No visualized mass     Pulmonary/Chest: [x] Respiratory effort normal.  [x] No visualized signs of difficulty breathing or respiratory distress        [] Abnormal-       Neurological:        [x] No Facial Asymmetry (Cranial nerve 7 motor function) (limited exam to video visit)          [] No gaze palsy        [] Abnormal-         Skin:        [] No significant exanthematous lesions or discoloration noted on facial skin         [] Abnormal-            Psychiatric:       [x] Normal Affect [] No Hallucinations        [] Abnormal-     Other pertinent observable physical exam findings-     ASSESSMENT/PLAN:  1. Type 2 diabetes mellitus without complication, without long-term current use of insulin (HCC)  A1c at goal.  Patient to continue metformin 1000 mg XR. Patient to continue Arb, statin, aspirin. Patient has eye exam scheduled for next month. 2. Essential hypertension  At goal.  Patient to continue losartan 50 mg. Most recent kidney function is within normal limits. 3. Pure hypercholesterolemia  Most recent fasting lipid panel and LFTs within normal limits. Patient to continue Lipitor 20 mg.    4. Morbid obesity ([address]  Patient to work on healthy eating and exercise. 5. Gastroesophageal reflux disease without esophagitis  Well-controlled with omeprazole 40 mg. Most recent magnesium level is within normal limits. 6. Vitamin D deficiency  Patient to continue ergocalciferol. Vitamin D is within normal limits.     7. Anxiety  Worsening with uncontrolled insomnia. Likely secondary to the fact that she is missed much of her Paxil, and is no longer able to afford doxepin. Patient to continue Paxil 20 mg daily. When she is compliant with this, she is to call and let me know in a couple weeks if she wants to increase the Paxil to 40 mg. This could help with the insomnia and anxiety. She is also going to look into the cost of the doxepin now that she might have met her deductible. HM:  Encouraged to get shingrix vaccine at pharmacy    Encouraged to schedule mammogram, DEXA scan and colonoscopy:  Numbers given (Call central scheduling for mammogram and DEXA scan:   [phone]. Call dr Bree San for colonoscopy:    [phone])    AWV: encouraged to schedule with my NP for virtual visit. Daniel Irene is a 69 y.o. female being evaluated by a Virtual Visit (video visit) encounter to address concerns as mentioned above. A caregiver was present when appropriate. Due to this being a TeleHealth encounter ([facility]), evaluation of the following organ systems was limited: Vitals/Constitutional/EENT/Resp/CV/GI//MS/Neuro/Skin/Heme-Lymph-Imm. Pursuant to the emergency declaration under the [address] [place] [address] authority and the [employer] Act, this Virtual Visit was conducted with patient's (and/or legal guardian's) consent, to reduce the patient's risk of exposure to COVID-19 and provide necessary medical care. The patient (and/or legal guardian) has also been advised to contact this office for worsening conditions or problems, and seek emergency medical treatment and/or call 911 if deemed necessary.      Patient identification was verified at the start of the visit: Yes    Total time spent on this encounter: Not billed by time    Services were provided through a video synchronous discussion virtually to substitute for in-person brain stem bleed clinic visit. Patient and provider were located at their individual homes. --Rayshawn Estrada MD on 8/24/2020 at 11:52 AM    An electronic signature was used to authenticate this note. Brainstem bleed

## 2024-10-06 NOTE — PROGRESS NOTE ADULT - SUBJECTIVE AND OBJECTIVE BOX
Evaluated at bedside, critically ill, Hypertensive with SBP>160.     REVIEW OF SYSTEMS: Unable to obtain.     PHYSICAL EXAM:  Cards: S1/S2, RRR  Respiratory: CTABL  Abdomen: Soft, non- tender  Extremities: No edema  Neuro: intubated and sedated      VITALS:  T(F): 100 (10-06-24 @ 14:28), Max: 100.6 (10-06-24 @ 09:19)  HR: 92 (10-06-24 @ 15:00)  BP: 164/102 (10-06-24 @ 15:00)  RR: 20 (10-06-24 @ 15:00)  SpO2: 97% (10-06-24 @ 15:00)  Wt(kg): --    10-04 @ 07:01  -  10-05 @ 07:00  --------------------------------------------------------  IN: 4085 mL / OUT: 1750 mL / NET: 2335 mL    10-05 @ 07:01  -  10-06 @ 07:00  --------------------------------------------------------  IN: 5185 mL / OUT: 2175 mL / NET: 3010 mL    10-06 @ 07:01  -  10-06 @ 16:20  --------------------------------------------------------  IN: 2190 mL / OUT: 1375 mL / NET: 815 mL          LABS:  10-06    154[H]  |  118[H]  |  46[H]  ----------------------------<  107[H]  4.0   |  24  |  4.96[H]    Ca    8.0[L]      06 Oct 2024 05:19  Phos  4.4     10-06  Mg     2.2     10-06                            11.2   7.13  )-----------( 203      ( 06 Oct 2024 05:19 )             36.5         acetaminophen   Oral Liquid .. 650 milliGRAM(s) Oral every 6 hours PRN  acetylcysteine 10%  Inhalation 4 milliLiter(s) Inhalation every 6 hours  albuterol/ipratropium for Nebulization 3 milliLiter(s) Nebulizer every 6 hours  amLODIPine   Tablet 10 milliGRAM(s) Oral daily  ceFAZolin   IVPB 1000 milliGRAM(s) IV Intermittent every 12 hours  chlorhexidine 0.12% Liquid 15 milliLiter(s) Oral Mucosa every 12 hours  chlorhexidine 2% Cloths 1 Application(s) Topical <User Schedule>  fentaNYL    Injectable 25 MICROGram(s) IV Push every 2 hours PRN  heparin   Injectable 5000 Unit(s) SubCutaneous every 8 hours  hydrALAZINE 25 milliGRAM(s) Oral every 8 hours  labetalol 200 milliGRAM(s) Enteral Tube every 8 hours  lactated ringers. 1000 milliLiter(s) IV Continuous <Continuous>  pantoprazole  Injectable 40 milliGRAM(s) IV Push daily  petrolatum Ophthalmic Ointment 1 Application(s) Both EYES two times a day

## 2024-10-06 NOTE — PROGRESS NOTE ADULT - SUBJECTIVE AND OBJECTIVE BOX
NSCU Progress Note    Assessment/Hospital Course:        24 Hour Events/Subjective:  - ongoing goc,contacted son's arrived last night, will continue discussions today      REVIEW OF SYSTEMS:  - negative except as above    VITALS:   - reviewed      IMAGING/DATA:   - Reviewed          PHYSICAL EXAM:    General: intubated  CVS: RRR  Pulm: CTAB  GI: Soft, NTND  Extremities: No LE Edema  Neuro: intubated, AOx0, 2mm reactive pupils, ?vertical gaze in tact on command?, uppers extensor, BLE 0/5

## 2024-10-06 NOTE — PROGRESS NOTE ADULT - ASSESSMENT
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 CPAP as tolerated  ABG, CXR  VAP bundle  ABG    CV:  SBP goal 100-160  labetalol 200mg tid  amlodipine 10  [place]      RENAL:   ANIKA though suspect CKD  Fluids: LR at 80cc/hr  Vuong catheter in place; monitor Is/Os  Na goal 140-150  [id number]  Nephrology following  [id number]      GI:  Diet: TF  GI prophylaxis [] not indicated [x] PPI [] other:  Bowel regimen [] colace [x] senna [] other: miralax  Monitor LFTs    ENDO:   Goal euglycemia (-180)      HEME/ONC:  VTE prophylaxis: [x] SCDs [x] chemoprophylaxis       ID:   Mild leukocytosis  f/u pan culture  Ancef (10/2 - ) x7d  MRSA swab neg

## 2024-10-06 NOTE — PROGRESS NOTE ADULT - SUBJECTIVE AND OBJECTIVE BOX
HPI:  Unknown age male, unknown past medical history (reported stroke and MI by coworkers) presented to [facility] with AMS, Pt was working at [employer] when he was found down by coworkers. EMS called and pt brought to [facility] ED. Intubated, sedated, started on cardene for SBPs in 200s. CT head showed brain stem bleed. Transferred to NSICU for further management.  (30 Sep 2024 12:55)      Subjective:  , o/n FW increased to 350q4 per nephrology recs.    Hospital course:  9/30: transferred from [facility]. A line placed. Versed dc'd. Cy Rader at bedside, states pt has family in [place], cannot confirm medications or PMH other than stroke and MI. 250cc bolus 3% given. LR switched to NS. hydralazine 25q8 started, 3% started, switched propofol to precedex   10/1: stability CTH done. Added labetalol, started TF. Palliative consulted. ethics consulted to determine surrogate. febrile 103, pan cx sent  10/2: BD 2, GEORGE overnight. TF resumed. Desatt'd to 80s, FiO2 inc. to 50. Fentanyl given, ABG, CXR ordered. Maxxed on precedex, started on propofol for DARIEN -4 - -5. Precedex dc'd. Duonebs, mucomyst, hypertonic added. 3% dc'd. Cardene dc'd. Start vanc/CTX. Increased labetalol 200q8. MRSA negative, dc'd vanc. ETT pulled back 2cm x 2, good positioning after confirmatory chest xray. Ethics attempting to establish HCP with family. Na 159, starting FW 250q6 for range 150-155.   10/3: BD3, GEORGE o/n, neuro stable. Na elevating, FW increased to 300q6. Dc'd bowel reg for diarrhea. vEEG started. SQH 5000q8 tonight.   10/4: BD 4, albumn bolus, incr. LR to 80 2/2 incr. in Cr, LR to 100cc/hr for uptrending Cr. Started 7% hypersal for 48hrs and SL atropine for inline/oral thick secretions. Dc'd CTX and started ancef for MSSA in the sputum. Nephrology consulted for CKD, f/u recs. SBP 170s, given hydralazine 10mg IVP.   10/5: BD5, o/n 10mg IVP hydralazine given for SBP 170s and started on hydralazine 25q8 via OGT. 10mg IV push labetalol for SBP > 160s. RT placed for diarrhea.   10/6: BD6, o/n FW increased to 350q4 per nephrology recs.    Vital Signs Last 24 Hrs  T(C): 37.7 (06 Oct 2024 01:02), Max: 37.8 (05 Oct 2024 22:15)  T(F): 99.9 (06 Oct 2024 01:02), Max: 100 (05 Oct 2024 22:15)  HR: 83 (06 Oct 2024 00:00) (74 - 90)  BP: 157/94 (06 Oct 2024 00:00) (134/79 - 172/100)  BP(mean): 120 (06 Oct 2024 00:00) (100 - 134)  RR: 18 (06 Oct 2024 00:00) (12 - 18)  SpO2: 99% (06 Oct 2024 00:00) (97% - 100%)    Parameters below as of 06 Oct 2024 00:00  Patient On (Oxygen Delivery Method): ventilator        I&O's Summary    04 Oct 2024 07:01  -  05 Oct 2024 07:00  --------------------------------------------------------  IN: 4085 mL / OUT: 1750 mL / NET: 2335 mL    05 Oct 2024 07:01  -  06 Oct 2024 01:45  --------------------------------------------------------  IN: 3525 mL / OUT: 1375 mL / NET: 2150 mL        PHYSICAL EXAM:  General: patient seen laying supine in bed in NAD  Neuro: OEs briefly to noxious, does not FC, does not respond to questions, +L corneal, absent R corneal reflex, +cough/gag, LUE and RUE extensor to noxious, RLE withdraws to noxious, LLE TF to noxious  HEENT: PERRL  Neck: supple  Cardiac: RRR, S1S2  Pulmonary: chest rise symmetric, +intubated  Abdomen: soft, nontender, nondistended, +OGT  Ext: perfusing well  Skin: warm, dry    LABS:                        11.8   7.84  )-----------( 203      ( 05 Oct 2024 05:30 )             39.2     10-05    157[H]  |  122[H]  |  49[H]  ----------------------------<  137[H]  4.2   |  24  |  4.87[H]    Ca    7.9[L]      05 Oct 2024 05:30  Phos  5.0     10-05  Mg     2.2     10-05        Urinalysis Basic - ( 05 Oct 2024 05:30 )    Color: x / Appearance: x / SG: x / pH: x  Gluc: 137 mg/dL / Ketone: x  / Bili: x / Urobili: x   Blood: x / Protein: x / Nitrite: x   Leuk Esterase: x / RBC: x / WBC x   Sq Epi: x / Non Sq Epi: x / Bacteria: x          CAPILLARY BLOOD GLUCOSE          Drug Levels: [] N/A    CSF Analysis: [] N/A      Allergies    Allergy Status Unknown    Intolerances      MEDICATIONS:  Antibiotics:  ceFAZolin   IVPB 1000 milliGRAM(s) IV Intermittent every 12 hours    Neuro:  acetaminophen   Oral Liquid .. 650 milliGRAM(s) Oral every 6 hours PRN  fentaNYL    Injectable 25 MICROGram(s) IV Push every 2 hours PRN    Anticoagulation:  heparin   Injectable 5000 Unit(s) SubCutaneous every 8 hours    OTHER:  acetylcysteine 10%  Inhalation 4 milliLiter(s) Inhalation every 6 hours  albuterol/ipratropium for Nebulization 3 milliLiter(s) Nebulizer every 6 hours  amLODIPine   Tablet 10 milliGRAM(s) Oral daily  atropine 1% Ophthalmic Solution for SubLingual Use 2 Drop(s) SubLingual every 8 hours  chlorhexidine 0.12% Liquid 15 milliLiter(s) Oral Mucosa every 12 hours  chlorhexidine 2% Cloths 1 Application(s) Topical <User Schedule>  hydrALAZINE 25 milliGRAM(s) Oral every 8 hours  labetalol 200 milliGRAM(s) Enteral Tube every 8 hours  pantoprazole  Injectable 40 milliGRAM(s) IV Push daily  petrolatum Ophthalmic Ointment 1 Application(s) Both EYES two times a day  sodium chloride 7% Inhalation 4 milliLiter(s) Inhalation every 12 hours    IVF:  lactated ringers. 1000 milliLiter(s) IV Continuous <Continuous>    CULTURES:  Culture Results:   Numerous Staphylococcus aureus  Normal Respiratory Miranda present (10-01 @ 19:54)  Culture Results:   No growth at 72 Hours (10-01 @ 17:46)    RADIOLOGY & ADDITIONAL TESTS:    AMS    Handoff    Acute myocardial infarction    CVA (cerebral vascular accident)    Hemorrhagic stroke    Brainstem stroke    Encephalopathy acute    Functional quadriplegia    Advanced care planning/counseling discussion    Encounter for palliative care    AMS    SysAdmin_VisitLink        ASSESSMENT:  43 y/o PMH ?stroke/MI present to [facility] after collapsing at work. Decorticate posturing, vomiting, intubated for airway protection. 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 Transferred to [facility] for further management    Neuro:  - neuro/vitals q1  - pain control: fentanyl 25q2 prn, tylenol  - CTH 9/30: enlarged pontine hemorrhage, CTH 10/3 done read stable   - vEEG (10/3-4)-neg  - stroke core measures, stroke neuro following  - palliative and ethics following, attempting to establish HCP  - MRI brain w/ w/o contrast pending    CV:  - SBP<160  - HTN: norvasc 10mg, labetalol 200q8, hydralazine 25q8  - echo (9/30) EF 75%    Resp:  - Intubated, FVS  - duonebs/mucomyst/7% x 48hrs (10/4-10/6), SL atropine for secretions x 48 hrs  (10/4-10/6)    GI:  - TF via OGT, nepro, banatrol   - bowel reg held for diarrhea, RT (10/5- )   - protonix while intubated  - Hypernatremia:  q4hrs    Renal:  - LR@100cc/hr  - Na 150-155  - voiding  - trend BUN/Cr: possible CKD? renal consult day 10/4  - renal US 10/1: echogenicity c/w chronic med renal dz  - nephro following, recs appreciated    Endo:  - ISS  - a1c 5.4    Heme:  - DVT ppx: SCDs, SQH 5000q8    ID:  - pan cx 10/1  - MRSA swab negative, sputum MSSA + , s/p 1 dose vanc (10/2), CTX (10/2 - 10/4), Ancef (10/4- )     Dispo: NSICU, full code    D/w Dr. D'Amico, Dr. Reveles    Assessment:  Present when checked    []  GCS  E   V  M     Heart Failure: []Acute, [] acute on chronic , []chronic  Heart Failure:  [] Diastolic (HFpEF), [] Systolic (HFrEF), []Combined (HFpEF and HFrEF), [] RHF, [] Pulm HTN, [] Other    [] ANIKA, [] ATN, [] AIN, [] other  [] CKD1, [] CKD2, [] CKD 3, [] CKD 4, [] CKD 5, []ESRD    Encephalopathy: [] Metabolic, [] Hepatic, [] toxic, [] Neurological, [] Other    Abnormal Nurtitional Status: [] malnurtition (see nutrition note), [ ]underweight: BMI < 19, [] morbid obesity: BMI >40, [] Cachexia    [] Sepsis  [] hypovolemic shock,[] cardiogenic shock, [] hemorrhagic shock, [] neuogenic shock  [] Acute Respiratory Failure  []Cerebral edema, [] Brain compression/ herniation,   [] Functional quadriplegia  [] Acute blood loss anemia

## 2024-10-07 LAB
ANION GAP SERPL CALC-SCNC: 11 MMOL/L — SIGNIFICANT CHANGE UP (ref 5–17)
APPEARANCE UR: CLEAR — SIGNIFICANT CHANGE UP
BACTERIA # UR AUTO: NEGATIVE /HPF — SIGNIFICANT CHANGE UP
BASE EXCESS BLDA CALC-SCNC: 0.1 MMOL/L — SIGNIFICANT CHANGE UP (ref -2–3)
BILIRUB UR-MCNC: NEGATIVE — SIGNIFICANT CHANGE UP
BUN SERPL-MCNC: 52 MG/DL — HIGH (ref 7–23)
CALCIUM SERPL-MCNC: 8.1 MG/DL — LOW (ref 8.4–10.5)
CAST: 1 /LPF — SIGNIFICANT CHANGE UP (ref 0–4)
CHLORIDE SERPL-SCNC: 115 MMOL/L — HIGH (ref 96–108)
CO2 BLDA-SCNC: 25 MMOL/L — HIGH (ref 19–24)
CO2 SERPL-SCNC: 23 MMOL/L — SIGNIFICANT CHANGE UP (ref 22–31)
COLOR SPEC: YELLOW — SIGNIFICANT CHANGE UP
CREAT SERPL-MCNC: 6.03 MG/DL — HIGH (ref 0.5–1.3)
CULTURE RESULTS: SIGNIFICANT CHANGE UP
DIFF PNL FLD: ABNORMAL
EGFR: 11 ML/MIN/1.73M2 — LOW
EGFR: 11 ML/MIN/1.73M2 — LOW
GAS PNL BLDA: SIGNIFICANT CHANGE UP
GLUCOSE SERPL-MCNC: 119 MG/DL — HIGH (ref 70–99)
GLUCOSE UR QL: NEGATIVE MG/DL — SIGNIFICANT CHANGE UP
GRAM STN FLD: ABNORMAL
HCO3 BLDA-SCNC: 24 MMOL/L — SIGNIFICANT CHANGE UP (ref 21–28)
HCT VFR BLD CALC: 36.8 % — LOW (ref 39–50)
HGB BLD-MCNC: 11.1 G/DL — LOW (ref 13–17)
KETONES UR-MCNC: NEGATIVE MG/DL — SIGNIFICANT CHANGE UP
LACTATE SERPL-SCNC: 0.7 MMOL/L — SIGNIFICANT CHANGE UP (ref 0.5–2)
LEUKOCYTE ESTERASE UR-ACNC: NEGATIVE — SIGNIFICANT CHANGE UP
MAGNESIUM SERPL-MCNC: 2.2 MG/DL — SIGNIFICANT CHANGE UP (ref 1.6–2.6)
MCHC RBC-ENTMCNC: 27.8 PG — SIGNIFICANT CHANGE UP (ref 27–34)
MCHC RBC-ENTMCNC: 30.2 GM/DL — LOW (ref 32–36)
MCV RBC AUTO: 92 FL — SIGNIFICANT CHANGE UP (ref 80–100)
NITRITE UR-MCNC: NEGATIVE — SIGNIFICANT CHANGE UP
NRBC # BLD: 0 /100 WBCS — SIGNIFICANT CHANGE UP (ref 0–0)
NRBC BLD-RTO: 0 /100 WBCS — SIGNIFICANT CHANGE UP (ref 0–0)
PCO2 BLDA: 35 MMHG — SIGNIFICANT CHANGE UP (ref 35–48)
PH BLDA: 7.44 — SIGNIFICANT CHANGE UP (ref 7.35–7.45)
PH UR: 7 — SIGNIFICANT CHANGE UP (ref 5–8)
PHOSPHATE SERPL-MCNC: 4.2 MG/DL — SIGNIFICANT CHANGE UP (ref 2.5–4.5)
PLATELET # BLD AUTO: 200 K/UL — SIGNIFICANT CHANGE UP (ref 150–400)
PO2 BLDA: 152 MMHG — HIGH (ref 83–108)
POTASSIUM SERPL-MCNC: 4.1 MMOL/L — SIGNIFICANT CHANGE UP (ref 3.5–5.3)
POTASSIUM SERPL-SCNC: 4.1 MMOL/L — SIGNIFICANT CHANGE UP (ref 3.5–5.3)
PROCALCITONIN SERPL-MCNC: 0.16 NG/ML — HIGH (ref 0.02–0.1)
PROT UR-MCNC: 100 MG/DL
RBC # BLD: 4 M/UL — LOW (ref 4.2–5.8)
RBC # FLD: 13.2 % — SIGNIFICANT CHANGE UP (ref 10.3–14.5)
RBC CASTS # UR COMP ASSIST: 10 /HPF — HIGH (ref 0–4)
SAO2 % BLDA: 98 % — SIGNIFICANT CHANGE UP (ref 94–98)
SODIUM SERPL-SCNC: 149 MMOL/L — HIGH (ref 135–145)
SP GR SPEC: 1.01 — SIGNIFICANT CHANGE UP (ref 1–1.03)
SPECIMEN SOURCE: SIGNIFICANT CHANGE UP
SPECIMEN SOURCE: SIGNIFICANT CHANGE UP
SQUAMOUS # UR AUTO: 0 /HPF — SIGNIFICANT CHANGE UP (ref 0–5)
UROBILINOGEN FLD QL: 0.2 MG/DL — SIGNIFICANT CHANGE UP (ref 0.2–1)
WBC # BLD: 8.75 K/UL — SIGNIFICANT CHANGE UP (ref 3.8–10.5)
WBC # FLD AUTO: 8.75 K/UL — SIGNIFICANT CHANGE UP (ref 3.8–10.5)
WBC UR QL: 1 /HPF — SIGNIFICANT CHANGE UP (ref 0–5)

## 2024-10-07 PROCEDURE — 71045 X-RAY EXAM CHEST 1 VIEW: CPT | Mod: 26

## 2024-10-07 PROCEDURE — 74018 RADEX ABDOMEN 1 VIEW: CPT | Mod: 26

## 2024-10-07 PROCEDURE — 99497 ADVNCD CARE PLAN 30 MIN: CPT | Mod: 25

## 2024-10-07 PROCEDURE — 99291 CRITICAL CARE FIRST HOUR: CPT

## 2024-10-07 PROCEDURE — 99233 SBSQ HOSP IP/OBS HIGH 50: CPT

## 2024-10-07 RX ORDER — ACETAMINOPHEN 500 MG/5ML
1000 LIQUID (ML) ORAL ONCE
Refills: 0 | Status: COMPLETED | OUTPATIENT
Start: 2024-10-07 | End: 2024-10-07

## 2024-10-07 RX ADMIN — LABETALOL HYDROCHLORIDE 200 MILLIGRAM(S): 200 TABLET, FILM COATED ORAL at 19:56

## 2024-10-07 RX ADMIN — Medication 1 APPLICATION(S): at 06:46

## 2024-10-07 RX ADMIN — ACETYLCYSTEINE 4 MILLILITER(S): 200 INHALANT RESPIRATORY (INHALATION) at 21:01

## 2024-10-07 RX ADMIN — Medication 100 MILLIGRAM(S): at 21:35

## 2024-10-07 RX ADMIN — ACETYLCYSTEINE 4 MILLILITER(S): 200 INHALANT RESPIRATORY (INHALATION) at 00:39

## 2024-10-07 RX ADMIN — IPRATROPIUM BROMIDE AND ALBUTEROL SULFATE 3 MILLILITER(S): .5; 2.5 SOLUTION RESPIRATORY (INHALATION) at 00:38

## 2024-10-07 RX ADMIN — SODIUM CHLORIDE 100 MILLILITER(S): 9 INJECTION, SOLUTION INTRAVENOUS at 03:33

## 2024-10-07 RX ADMIN — HEPARIN SODIUM 5000 UNIT(S): 1000 INJECTION INTRAVENOUS; SUBCUTANEOUS at 15:01

## 2024-10-07 RX ADMIN — Medication 25 MILLIGRAM(S): at 21:35

## 2024-10-07 RX ADMIN — HEPARIN SODIUM 5000 UNIT(S): 1000 INJECTION INTRAVENOUS; SUBCUTANEOUS at 06:43

## 2024-10-07 RX ADMIN — Medication 1000 MILLIGRAM(S): at 12:19

## 2024-10-07 RX ADMIN — Medication 1 APPLICATION(S): at 17:39

## 2024-10-07 RX ADMIN — AMLODIPINE BESYLATE 10 MILLIGRAM(S): 10 TABLET ORAL at 06:43

## 2024-10-07 RX ADMIN — Medication 400 MILLIGRAM(S): at 11:10

## 2024-10-07 RX ADMIN — Medication 650 MILLIGRAM(S): at 17:38

## 2024-10-07 RX ADMIN — ACETYLCYSTEINE 4 MILLILITER(S): 200 INHALANT RESPIRATORY (INHALATION) at 05:13

## 2024-10-07 RX ADMIN — LABETALOL HYDROCHLORIDE 200 MILLIGRAM(S): 200 TABLET, FILM COATED ORAL at 06:43

## 2024-10-07 RX ADMIN — Medication 15 MILLILITER(S): at 06:44

## 2024-10-07 RX ADMIN — HEPARIN SODIUM 5000 UNIT(S): 1000 INJECTION INTRAVENOUS; SUBCUTANEOUS at 21:35

## 2024-10-07 RX ADMIN — Medication 650 MILLIGRAM(S): at 03:33

## 2024-10-07 RX ADMIN — Medication 100 MILLIGRAM(S): at 10:15

## 2024-10-07 RX ADMIN — Medication 650 MILLIGRAM(S): at 04:32

## 2024-10-07 RX ADMIN — IPRATROPIUM BROMIDE AND ALBUTEROL SULFATE 3 MILLILITER(S): .5; 2.5 SOLUTION RESPIRATORY (INHALATION) at 05:13

## 2024-10-07 RX ADMIN — IPRATROPIUM BROMIDE AND ALBUTEROL SULFATE 3 MILLILITER(S): .5; 2.5 SOLUTION RESPIRATORY (INHALATION) at 15:25

## 2024-10-07 RX ADMIN — Medication 25 MILLIGRAM(S): at 06:43

## 2024-10-07 RX ADMIN — Medication 650 MILLIGRAM(S): at 18:20

## 2024-10-07 RX ADMIN — Medication 40 MILLIGRAM(S): at 12:37

## 2024-10-07 RX ADMIN — ACETYLCYSTEINE 4 MILLILITER(S): 200 INHALANT RESPIRATORY (INHALATION) at 15:26

## 2024-10-07 RX ADMIN — LABETALOL HYDROCHLORIDE 200 MILLIGRAM(S): 200 TABLET, FILM COATED ORAL at 11:00

## 2024-10-07 RX ADMIN — IPRATROPIUM BROMIDE AND ALBUTEROL SULFATE 3 MILLILITER(S): .5; 2.5 SOLUTION RESPIRATORY (INHALATION) at 10:03

## 2024-10-07 RX ADMIN — Medication 15 MILLILITER(S): at 17:38

## 2024-10-07 RX ADMIN — ACETYLCYSTEINE 4 MILLILITER(S): 200 INHALANT RESPIRATORY (INHALATION) at 10:02

## 2024-10-07 RX ADMIN — Medication 25 MICROGRAM(S): at 22:50

## 2024-10-07 RX ADMIN — Medication 25 MILLIGRAM(S): at 15:01

## 2024-10-07 RX ADMIN — IPRATROPIUM BROMIDE AND ALBUTEROL SULFATE 3 MILLILITER(S): .5; 2.5 SOLUTION RESPIRATORY (INHALATION) at 21:01

## 2024-10-07 NOTE — PROGRESS NOTE ADULT - SUBJECTIVE AND OBJECTIVE BOX
NSCU Progress Note    Assessment/Hospital Course:        24 Hour Events/Subjective:  - ongoing goc  - febrile, pan cultured      REVIEW OF SYSTEMS:  - negative except as above    VITALS:   - reviewed      IMAGING/DATA:   - Reviewed          PHYSICAL EXAM:    General: intubated  CVS: RRR  Pulm: CTAB  GI: Soft, NTND  Extremities: No LE Edema  Neuro: intubated, AOx0, 2mm reactive pupils, ?vertical gaze in tact on command?, uppers extensor, BLE 0/5

## 2024-10-07 NOTE — PROGRESS NOTE ADULT - PROBLEM SELECTOR PLAN 1
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 Patient continues to have brainstem reflexes but no purposeful movements.  - Neurosurgery following. No intervention planned  - Appreciate excellent NSICU care.  - MRI planned for prognostication  - Overall poor prognosis. GOC with family ongoing.

## 2024-10-07 NOTE — PROGRESS NOTE ADULT - SUBJECTIVE AND OBJECTIVE BOX
[facility] AD[facility]    Nighttime rounds were performed     Patient is a 45 y/o male with unknown past medical history (reported stroke and MI by coworkers). He presented to [facility] with AMS. Prior to this, pt was working at [employer] when he was found down by coworkers. EMS called and pt brought to [facility] ED. Intubated, sedated, started on cardene for SBPs in 200s. CT head showed pontine bleed. Transferred to NSICU for further management.  (30 Sep 2024 12:55)    On admission, the patient was:  GCS:   Cash-Shane:  modified Robles:  ICH score: 3  NIHSS:    *** HIGH RISK OF DVT PRESENT ON ADMISSION ***      ICU Vital Signs Last 24 Hrs  T(C): 38.7 (07 Oct 2024 17:19), Max: 38.9 (07 Oct 2024 04:38)  T(F): 101.7 (07 Oct 2024 17:19), Max: 102 (07 Oct 2024 04:38)  HR: 98 (07 Oct 2024 18:00) (86 - 105)  BP: 164/91 (07 Oct 2024 18:00) (140/84 - 170/107)  BP(mean): 119 (07 Oct 2024 18:00) (92 - 133)  ABP: 232/170 (07 Oct 2024 17:00) (185/120 - 232/170)  ABP(mean): 192 (07 Oct 2024 17:00) (141 - 192)  RR: 15 (07 Oct 2024 18:00) (14 - 22)  SpO2: 94% (07 Oct 2024 18:00) (94% - 99%)      10-06-24 @ 07:01  -  10-07-24 @ 07:00  --------------------------------------------------------  IN: 5440 mL / OUT: 2450 mL / NET: 2990 mL    10-07-24 @ 07:01  -  10-07-24 @ 19:09  --------------------------------------------------------  IN: 800 mL / OUT: 500 mL / NET: 300 mL      Mode: V500 #, RR (machine): 12, TV (machine): 450, FiO2: 50, PEEP: 5, MAP: 9, PIP: 10  acetaminophen   Oral Liquid .. 650 milliGRAM(s) Oral every 6 hours PRN  acetylcysteine 10%  Inhalation 4 milliLiter(s) Inhalation every 6 hours  albuterol/ipratropium for Nebulization 3 milliLiter(s) Nebulizer every 6 hours  amLODIPine   Tablet 10 milliGRAM(s) Oral daily  ceFAZolin   IVPB 1000 milliGRAM(s) IV Intermittent every 12 hours  chlorhexidine 0.12% Liquid 15 milliLiter(s) Oral Mucosa every 12 hours  chlorhexidine 2% Cloths 1 Application(s) Topical <User Schedule>  fentaNYL    Injectable 25 MICROGram(s) IV Push every 2 hours PRN  heparin   Injectable 5000 Unit(s) SubCutaneous every 8 hours  hydrALAZINE 25 milliGRAM(s) Oral every 8 hours  labetalol 200 milliGRAM(s) Enteral Tube every 8 hours  lactated ringers. 1000 milliLiter(s) (100 mL/Hr) IV Continuous <Continuous>  pantoprazole  Injectable 40 milliGRAM(s) IV Push daily  petrolatum Ophthalmic Ointment 1 Application(s) Both EYES two times a day                          11.1   8.75  )-----------( 200      ( 07 Oct 2024 04:39 )             36.8     10-07    149[H]  |  115[H]  |  52[H]  ----------------------------<  119[H]  4.1   |  23  |  6.03[H]    Ca    8.1[L]      07 Oct 2024 04:39  Phos  4.2     10-07  Mg     2.2     10-07        ABG - ( 07 Oct 2024 04:38 )  pH, Arterial: 7.44  pH, Blood: x     /  pCO2: 35    /  pO2: 152   / HCO3: 24    / Base Excess: 0.1   /  SaO2: 98.0                            ASSESSMENT/PLAN:  73 y/o M with 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 Negative for seizures. DCed.    PULM: Intubated  Full vent support. CPAP as tolerated  ABG, CXR  VAP bundle. Pulm toilet. On nebs (duonebs, mucomyst)    CV:  SBP goal 100-160  On amlodipine, hydralazine and labetalol   TTE: EF 75%    RENAL: CKD; ANIKA component. Non oliguric  Fluids: LR at 100cc/hr. Monitor Is/Os  Renal US reviewed. No hydronephrosis. Demonstrates chronic medical renal disease  Na goal 145-155.  Free water flushes 350 Q4  Renal following    GI:  Diet: TFs; nepro  GI prophylaxis [] not indicated [x] PPI [] other:  LBM: 10/6; diarrhea; rectal tube in place. Banatrol. Hold bowel regimen    ENDO:   Goal euglycemia (-180)    HEME/ONC:  VTE prophylaxis: [x] SCDs [x] SQH    ID: Febrile, MSSA pneumonia  Cultures:  Sputum: Staph aureus  Blood cultures NGTD  MRSA nares: MSSA positive  Antibiotics: Ancef    Palliative care and Ethics following    Continue care per daytime intensivist Mario Alberto HOUSTON    Critical care time: 45 minutes         [facility]    Nighttime rounds were performed     Patient is a 43 y/o male with unknown past medical history (reported stroke and MI by coworkers). He presented to [facility] with AMS. Prior to this, pt was working at [employer] when he was found down by coworkers. EMS called and pt brought to [facility] ED. Intubated, sedated, started on cardene for SBPs in 200s. CT head showed pontine bleed. Transferred to NSICU for further management.  (30 Sep 2024 12:55)    On admission, the patient was:  GCS:   Cash-Shane:  modified Robles:  ICH score: 3  NIHSS:    *** HIGH RISK OF DVT PRESENT ON ADMISSION ***      ICU Vital Signs Last 24 Hrs  T(C): 38.7 (07 Oct 2024 17:19), Max: 38.9 (07 Oct 2024 04:38)  T(F): 101.7 (07 Oct 2024 17:19), Max: 102 (07 Oct 2024 04:38)  HR: 98 (07 Oct 2024 18:00) (86 - 105)  BP: 164/91 (07 Oct 2024 18:00) (140/84 - 170/107)  BP(mean): 119 (07 Oct 2024 18:00) (92 - 133)  ABP: 232/170 (07 Oct 2024 17:00) (185/120 - 232/170)  ABP(mean): 192 (07 Oct 2024 17:00) (141 - 192)  RR: 15 (07 Oct 2024 18:00) (14 - 22)  SpO2: 94% (07 Oct 2024 18:00) (94% - 99%)      10-06-24 @ 07:01  -  10-07-24 @ 07:00  --------------------------------------------------------  IN: 5440 mL / OUT: 2450 mL / NET: 2990 mL    10-07-24 @ 07:01  -  10-07-24 @ 19:09  --------------------------------------------------------  IN: 800 mL / OUT: 500 mL / NET: 300 mL      Mode: Vt 500 RR (machine): 12, TV (machine): 450, FiO2: 50, PEEP: 5, MAP: 9, PIP: 10    MEDICATIONS:  acetaminophen   Oral Liquid .. 650 milliGRAM(s) Oral every 6 hours PRN  acetylcysteine 10%  Inhalation 4 milliLiter(s) Inhalation every 6 hours  albuterol/ipratropium for Nebulization 3 milliLiter(s) Nebulizer every 6 hours  amLODIPine   Tablet 10 milliGRAM(s) Oral daily  ceFAZolin   IVPB 1000 milliGRAM(s) IV Intermittent every 12 hours  chlorhexidine 0.12% Liquid 15 milliLiter(s) Oral Mucosa every 12 hours  chlorhexidine 2% Cloths 1 Application(s) Topical <User Schedule>  fentaNYL    Injectable 25 MICROGram(s) IV Push every 2 hours PRN  heparin   Injectable 5000 Unit(s) SubCutaneous every 8 hours  hydrALAZINE 25 milliGRAM(s) Oral every 8 hours  labetalol 200 milliGRAM(s) Enteral Tube every 8 hours  lactated ringers. 1000 milliLiter(s) (100 mL/Hr) IV Continuous <Continuous>  pantoprazole  Injectable 40 milliGRAM(s) IV Push daily  petrolatum Ophthalmic Ointment 1 Application(s) Both EYES two times a day      LABS:                      11.1   8.75  )-----------( 200      ( 07 Oct 2024 04:39 )             36.8     10-07    149[H]  |  115[H]  |  52[H]  ----------------------------<  119[H]  4.1   |  23  |  6.03[H]    Ca    8.1[L]      07 Oct 2024 04:39  Phos  4.2     10-07  Mg     2.2     10-07        ABG - ( 07 Oct 2024 04:38 )  pH, Arterial: 7.44  pH, Blood: x     /  pCO2: 35    /  pO2: 152   / HCO3: 24    / Base Excess: 0.1   /  SaO2: 98.0                            ASSESSMENT/PLAN:  73 y/o M with 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 Negative for seizures. DCed.    PULM: Intubated  Full vent support. CPAP as tolerated  ABG, CXR  VAP bundle. Pulm toilet. On nebs (duonebs, mucomyst)    CV:  SBP goal 100-160  On amlodipine, hydralazine and labetalol   TTE: EF 75%    RENAL: CKD; ANIKA component. Non oliguric  Fluids: LR at 100cc/hr. Monitor Is/Os  Renal US reviewed. No hydronephrosis. Demonstrates chronic medical renal disease  Na goal 145-155.  Free water flushes 350 Q4  Renal following    GI:  Diet: TFs; nepro  GI prophylaxis [] not indicated [x] PPI [] other:  LBM: 10/6; diarrhea; rectal tube in place. Banatrol. Hold bowel regimen    ENDO:   Goal euglycemia (-180)    HEME/ONC:  VTE prophylaxis: [x] SCDs [x] SQH    ID: Febrile, MSSA pneumonia  Cultures:  Sputum: Staph aureus  Blood cultures NGTD  MRSA nares: MSSA positive  Antibiotics: Ancef    Palliative care and Ethics following    Continue care per daytime intensivist Mario Alberto HOUSTON    Critical care time: 45 minutes         [facility]    Nighttime rounds were performed     Patient is a 43 y/o male with unknown past medical history (reported stroke and MI by coworkers). He presented to [facility] with AMS. Prior to this, pt was working at [employer] when he was found down by coworkers. EMS called and pt brought to [facility] ED. Intubated, sedated, started on cardene for SBPs in 200s. CT head showed pontine bleed. Transferred to NSICU for further management.  (30 Sep 2024 12:55)    On admission, the patient was:  GCS:   Cash-Shane:  modified Robles:  ICH score: 3  NIHSS:    *** HIGH RISK OF DVT PRESENT ON ADMISSION ***      ICU Vital Signs Last 24 Hrs  T(C): 38.7 (07 Oct 2024 17:19), Max: 38.9 (07 Oct 2024 04:38)  T(F): 101.7 (07 Oct 2024 17:19), Max: 102 (07 Oct 2024 04:38)  HR: 98 (07 Oct 2024 18:00) (86 - 105)  BP: 164/91 (07 Oct 2024 18:00) (140/84 - 170/107)  BP(mean): 119 (07 Oct 2024 18:00) (92 - 133)  ABP: 232/170 (07 Oct 2024 17:00) (185/120 - 232/170)  ABP(mean): 192 (07 Oct 2024 17:00) (141 - 192)  RR: 15 (07 Oct 2024 18:00) (14 - 22)  SpO2: 94% (07 Oct 2024 18:00) (94% - 99%)      10-06-24 @ 07:01  -  10-07-24 @ 07:00  --------------------------------------------------------  IN: 5440 mL / OUT: 2450 mL / NET: 2990 mL    10-07-24 @ 07:01  -  10-07-24 @ 19:09  --------------------------------------------------------  IN: 800 mL / OUT: 500 mL / NET: 300 mL      Mode: Vt 500 RR (machine): 12, TV (machine): 450, FiO2: 50, PEEP: 5, MAP: 9, PIP: 10    EXAMINATION:  General: Not on sedation  HEENT:  MMM, ETT  Neuro: Patient not on sedation, pupils 3mm and reactive, cough/gag, corneals reflexes present on L, not on right  Not following commands (previously ? looks down at toes)  B/L upper extends. RLE brisk TF, LLE  TF  Cards: S1/S2, RRR  Respiratory: Clear, no wheeze  Abdomen: Soft, non- tender  Extremities: No edema  Skin: Warm/dry    MEDICATIONS:  acetaminophen   Oral Liquid .. 650 milliGRAM(s) Oral every 6 hours PRN  acetylcysteine 10%  Inhalation 4 milliLiter(s) Inhalation every 6 hours  albuterol/ipratropium for Nebulization 3 milliLiter(s) Nebulizer every 6 hours  amLODIPine   Tablet 10 milliGRAM(s) Oral daily  ceFAZolin   IVPB 1000 milliGRAM(s) IV Intermittent every 12 hours  chlorhexidine 0.12% Liquid 15 milliLiter(s) Oral Mucosa every 12 hours  chlorhexidine 2% Cloths 1 Application(s) Topical <User Schedule>  fentaNYL    Injectable 25 MICROGram(s) IV Push every 2 hours PRN  heparin   Injectable 5000 Unit(s) SubCutaneous every 8 hours  hydrALAZINE 25 milliGRAM(s) Oral every 8 hours  labetalol 200 milliGRAM(s) Enteral Tube every 8 hours  lactated ringers. 1000 milliLiter(s) (100 mL/Hr) IV Continuous <Continuous>  pantoprazole  Injectable 40 milliGRAM(s) IV Push daily  petrolatum Ophthalmic Ointment 1 Application(s) Both EYES two times a day      LABS:                      11.1   8.75  )-----------( 200      ( 07 Oct 2024 04:39 )             36.8     10-07    149[H]  |  115[H]  |  52[H]  ----------------------------<  119[H]  4.1   |  23  |  6.03[H]    Ca    8.1[L]      07 Oct 2024 04:39  Phos  4.2     10-07  Mg     2.2     10-07        ABG - ( 07 Oct 2024 04:38 )  pH, Arterial: 7.44  pH, Blood: x     /  pCO2: 35    /  pO2: 152   / HCO3: 24    / Base Excess: 0.1   /  SaO2: 98.0                            ASSESSMENT/PLAN:  71 y/o M with 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 Negative for seizures. DCed.    PULM: Intubated  Full vent support. CPAP as tolerated  ABG, CXR  VAP bundle. Pulm toilet. On nebs (duonebs, mucomyst)    CV:  SBP goal 100-160  On amlodipine, hydralazine and labetalol   TTE: EF 75%    RENAL: CKD; ANIKA component. Non oliguric  Fluids: LR at 100cc/hr. Monitor Is/Os  Renal US reviewed. No hydronephrosis. Demonstrates chronic medical renal disease  Na goal 145-155.  Free water flushes 350 Q4  Renal following    GI:  Diet: TFs; nepro  GI prophylaxis [] not indicated [x] PPI [] other:  LBM: 10/6; diarrhea; rectal tube in place. Banatrol. Hold bowel regimen    ENDO:   Goal euglycemia (-180)    HEME/ONC:  VTE prophylaxis: [x] SCDs [x] SQH    ID: Febrile, MSSA pneumonia  Cultures:  Sputum: Staph aureus  Blood cultures NGTD  MRSA nares: MSSA positive  Antibiotics: Ancef    Palliative care and Ethics following    Continue care per daytime intensivist Mario Alberto HOUSTON    Critical care time: 45 minutes

## 2024-10-07 NOTE — PROGRESS NOTE ADULT - ASSESSMENT
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 CPAP as tolerated  ABG, CXR  VAP bundle  ABG    CV:  SBP goal 100-160  labetalol 200mg tid  amlodipine 10  [place]      RENAL:   ANIKA though suspect CKD  Fluids: LR at 80cc/hr  Vuong catheter in place; monitor Is/Os  Na goal 140-150  BMPq12  Nephrology following  [id number]      GI:  Diet: TF  GI prophylaxis [] not indicated [x] PPI [] other:  Bowel regimen [] colace [x] senna [] other: miralax  Monitor LFTs    ENDO:   Goal euglycemia (-180)      HEME/ONC:  VTE prophylaxis: [x] SCDs [x] chemoprophylaxis       ID:   Mild leukocytosis  f/u pan culture  Ancef (10/2 - ) x7d  MRSA swab neg  f/u pan culture

## 2024-10-07 NOTE — PROGRESS NOTE ADULT - SUBJECTIVE AND OBJECTIVE BOX
SUBJECTIVE AND OBJECTIVE:  Indication for Geriatrics and Palliative Care Services: [facility]    OVERNIGHT EVENTS: No acute changes over the weekend. Patient's surrogate, Jason Aguayo, arrived from [place].    Allergies    Allergy Status Unknown    Intolerances    MEDICATIONS  (STANDING):  acetylcysteine 10%  Inhalation 4 milliLiter(s) Inhalation every 6 hours  albuterol/ipratropium for Nebulization 3 milliLiter(s) Nebulizer every 6 hours  amLODIPine   Tablet 10 milliGRAM(s) Oral daily  ceFAZolin   IVPB 1000 milliGRAM(s) IV Intermittent every 12 hours  chlorhexidine 0.12% Liquid 15 milliLiter(s) Oral Mucosa every 12 hours  chlorhexidine 2% Cloths 1 Application(s) Topical <User Schedule>  heparin   Injectable 5000 Unit(s) SubCutaneous every 8 hours  hydrALAZINE 25 milliGRAM(s) Oral every 8 hours  labetalol 200 milliGRAM(s) Enteral Tube every 8 hours  lactated ringers. 1000 milliLiter(s) (100 mL/Hr) IV Continuous <Continuous>  pantoprazole  Injectable 40 milliGRAM(s) IV Push daily  petrolatum Ophthalmic Ointment 1 Application(s) Both EYES two times a day    MEDICATIONS  (PRN):  acetaminophen   Oral Liquid .. 650 milliGRAM(s) Oral every 6 hours PRN Temp greater or equal to 38C (100.4F), Mild Pain (1 - 3)  fentaNYL    Injectable 25 MICROGram(s) IV Push every 2 hours PRN agitation, severe pain, vent desynchrony      ITEMS UNCHECKED ARE NOT PRESENT    PRESENT SYMPTOMS: [X]Unable to self-report  Source if other than patient:  [ ]Family   [X]Team     Pain:  [ ]yes [ ]no  QOL impact -   Location -   Aggravating factors -  Quality -   Radiation -   Timing -   Severity (0-10 scale):   Minimal acceptable level (0-10 scale):     Dyspnea:                           [ ]Mild [ ]Moderate [ ]Severe  Anxiety:                             [ ]Mild [ ]Moderate [ ]Severe  Fatigue:                             [ ]Mild [ ]Moderate [ ]Severe  Nausea:                             [ ]Mild [ ]Moderate [ ]Severe  Loss of appetite:              [ ]Mild [ ]Moderate [ ]Severe  Constipation:                    [ ]Mild [ ]Moderate [ ]Severe    Other Symptoms:  [X]All other review of systems negative    Palliative Performance Status Version 2:         10%      http://npcrc.org/files/news/palliative_performance_scale_ppsv2.pdf    PHYSICAL EXAM:  Vital Signs Last 24 Hrs  T(C): 38.1 (07 Oct 2024 14:05), Max: 38.9 (07 Oct 2024 04:38)  T(F): 100.6 (07 Oct 2024 14:05), Max: 102 (07 Oct 2024 04:38)  HR: 90 (07 Oct 2024 14:00) (86 - 105)  BP: 159/96 (07 Oct 2024 14:00) (140/84 - 170/107)  BP(mean): 120 (07 Oct 2024 14:00) (92 - 133)  RR: 16 (07 Oct 2024 14:00) (14 - 22)  SpO2: 99% (07 Oct 2024 14:00) (94% - 99%)    Parameters below as of 07 Oct 2024 14:00  Patient On (Oxygen Delivery Method): ventilator    O2 Concentration (%): 50 I&O's Summary    06 Oct 2024 07:01  -  07 Oct 2024 07:00  --------------------------------------------------------  IN: 5440 mL / OUT: 2450 mL / NET: 2990 mL    07 Oct 2024 07:01  -  07 Oct 2024 17:02  --------------------------------------------------------  IN: 380 mL / OUT: 150 mL / NET: 230 mL       GENERAL: [ ]Cachexia    [ ]Alert  [ ]Oriented x   [ ]Lethargic  [X]Unarousable  [ ]Verbal  [ ]Non-Verbal  Behavioral:   [ ]Anxiety  [ ]Delirium [ ]Agitation [x]Calm  HEENT:  [ ]Normal   [ ]Dry mouth   [X]ET Tube  [ ]Oral lesions  PULMONARY:   [X]Clear [ ]Tachypnea  [ ]Audible excessive secretions   [ ]Rhonchi        [ ]Right [ ]Left [ ]Bilateral  [ ]Crackles        [ ]Right [ ]Left [ ]Bilateral  [ ]Wheezing     [ ]Right [ ]Left [ ]Bilateral  [ ]Diminished BS [ ] Right [ ]Left [ ]Bilateral  CARDIOVASCULAR:    [X]Regular [ ]Irregular [ ]Tachy  [ ]Alexi [ ]Murmur [ ]Other  GASTROINTESTINAL:  [X]Soft  [ ]Distended   [ ]+BS  [ ]Non tender [ ]Tender  [ ]Other [ ]PEG [X]OGT/ NGT   Last BM:   GENITOURINARY:  [ ]Normal [ ]Incontinent   [ ]Oliguria/Anuria   [X]Vuong  MUSCULOSKELETAL:   [ ]Normal   [ ]Weakness  [X]Bed/Wheelchair bound [ ]Edema  NEUROLOGIC:   [ ]No focal deficits  [X] Cognitive impairment  [ ] Dysphagia [ ]Dysarthria [ ] Paresis [ ]Other   SKIN:   [X]Normal  [ ]Rash  [ ]Other  [ ]Pressure ulcer(s) [ ]y [ ]n present on admission    CRITICAL CARE:  [ ]Shock Present  [ ]Septic [ ]Cardiogenic [ ]Neurologic [ ]Hypovolemic  [ ]Vasopressors [ ]Inotropes  [X]Respiratory failure present [X]Mechanical Ventilation [ ]Non-invasive ventilatory support [ ]High-Flow Mode: AC/ CMV (Assist Control/ Continuous Mandatory Ventilation), RR (machine): 12, TV (machine): 430, FiO2: 50, PEEP: 5, MAP: 8, PIP: 10  [ ]Acute  [ ]Chronic [ ]Hypoxic  [ ]Hypercarbic [ ]Other  [ ]Other organ failure     LABS:                        11.1   8.75  )-----------( 200      ( 07 Oct 2024 04:39 )             36.8   10-07    149[H]  |  115[H]  |  52[H]  ----------------------------<  119[H]  4.1   |  23  |  6.03[H]    Ca    8.1[L]      07 Oct 2024 04:39  Phos  4.2     10-07  Mg     2.2     10-07        Urinalysis Basic - ( 07 Oct 2024 04:39 )    Color: x / Appearance: x / SG: x / pH: x  Gluc: 119 mg/dL / Ketone: x  / Bili: x / Urobili: x   Blood: x / Protein: x / Nitrite: x   Leuk Esterase: x / RBC: x / WBC x   Sq Epi: x / Non Sq Epi: x / Bacteria: x      RADIOLOGY & ADDITIONAL STUDIES:  < from: Xray Chest 1 View- PORTABLE-Routine (10.07.24 @ 05:30) >  COMPARISON: October 6, 2024.    Findings/  impression: Distal repositioning of endotracheal tube above the chris.   Stable positioning of NG tube. Increased elevation of the right   hemidiaphragm, right basilar discoid atelectasis. Left basilar discoid   atelectasis, unchanged. Heart and mediastinumare unremarkable. Stable   bony structures.    --- End of Report ---    < end of copied text >      Protein Calorie Malnutrition Present: [ ]mild [ ]moderate [ ]severe [ ]underweight [ ]morbid obesity  https://www.andeal.org/vault/2440/web/files/ONC/Table_Clinical%20Characteristics%20to%20Document%20Malnutrition-White%20JV%20et%20al%144930.pdf    Height (cm): 172.7 (09-30-24 @ 08:39)  Weight (kg): 80 (09-30-24 @ 09:04)  BMI (kg/m2): 26.8 (09-30-24 @ 09:04)    [X]PPSV2 < or = 30%  [ ]significant weight loss [ ]poor nutritional intake [ ]anasarca[ ]Artificial Nutrition    REFERRALS:   [ ]Chaplaincy  [ ]Hospice  [ ]Child Life  [X]Social Work [ ]Patient and Family Support [X]Case management [ ]Holistic Therapy [ ] Music therapy  [ ] Massage Therapy    DISCUSSION OF CASE: Family - obtained additional history and to provide emotional support;  Primary team - discussed plan of care

## 2024-10-07 NOTE — PROGRESS NOTE ADULT - SUBJECTIVE AND OBJECTIVE BOX
HPI:  Unknown age male, unknown past medical history (reported stroke and MI by coworkers) presented to [facility] with AMS, Pt was working at [employer] when he was found down by coworkers. EMS called and pt brought to [facility] ED. Intubated, sedated, started on cardene for SBPs in 200s. CT head showed brain stem bleed. Transferred to NSICU for further management.  (30 Sep 2024 12:55)      Subjective:  GEORGE overnight.     Hospital Course:   9/30: transferred from [facility]. A line placed. Versed dc'd. Cy Rader at bedside, states pt has family in [place], cannot confirm medications or PMH other than stroke and MI. 250cc bolus 3% given. LR switched to NS. hydralazine 25q8 started, 3% started, switched propofol to precedex   10/1: stability CTH done. Added labetalol, started TF. Palliative consulted. ethics consulted to determine surrogate. febrile 103, pan cx sent  10/2: BD 2, GEORGE overnight. TF resumed. Desatt'd to 80s, FiO2 inc. to 50. Fentanyl given, ABG, CXR ordered. Maxxed on precedex, started on propofol for DARIEN -4 - -5. Precedex dc'd. Duonebs, mucomyst, hypertonic added. 3% dc'd. Cardene dc'd. Start vanc/CTX. Increased labetalol 200q8. MRSA negative, dc'd vanc. ETT pulled back 2cm x 2, good positioning after confirmatory chest xray. Ethics attempting to establish HCP with family. Na 159, starting FW 250q6 for range 150-155.   10/3: BD3, GEORGE o/n, neuro stable. Na elevating, FW increased to 300q6. Dc'd bowel reg for diarrhea. vEEG started. SQH 5000q8 tonight.   10/4: BD 4, albumn bolus, incr. LR to 80 2/2 incr. in Cr, LR to 100cc/hr for uptrending Cr. Started 7% hypersal for 48hrs and SL atropine for inline/oral thick secretions. Dc'd CTX and started ancef for MSSA in the sputum. Nephrology consulted for CKD, f/u recs. SBP 170s, given hydralazine 10mg IVP.   10/5: BD5, o/n 10mg IVP hydralazine given for SBP 170s and started on hydralazine 25q8 via OGT. 10mg IV push labetalol for SBP > 160s. RT placed for diarrhea.   10/6: BD6, o/n FW increased to 350q4 per nephrology recs. IV tylenol for temp 100.6, SBp 160s presumed uncomfortable.   10/7: BD7, GEORGE overnight    Vital Signs Last 24 Hrs  T(C): 38.3 (07 Oct 2024 00:39), Max: 38.3 (07 Oct 2024 00:39)  T(F): 100.9 (07 Oct 2024 00:39), Max: 100.9 (07 Oct 2024 00:39)  HR: 90 (07 Oct 2024 01:00) (80 - 105)  BP: 159/89 (07 Oct 2024 01:00) (138/89 - 187/118)  BP(mean): 115 (07 Oct 2024 01:00) (106 - 144)  RR: 19 (07 Oct 2024 01:00) (16 - 20)  SpO2: 98% (07 Oct 2024 01:00) (94% - 99%)    Parameters below as of 07 Oct 2024 01:00  Patient On (Oxygen Delivery Method): ventilator    O2 Concentration (%): 50    I&O's Detail    05 Oct 2024 07:01  -  06 Oct 2024 07:00  --------------------------------------------------------  IN:    Free Water: 1400 mL    IV PiggyBack: 50 mL    Lactated Ringers: 2400 mL    Nepro with Carb Steady: 1335 mL  Total IN: 5185 mL    OUT:    Rectal Tube (mL): 350 mL    Voided (mL): 1825 mL  Total OUT: 2175 mL    Total NET: 3010 mL      06 Oct 2024 07:01  -  07 Oct 2024 01:50  --------------------------------------------------------  IN:    Free Water: 1050 mL    IV PiggyBack: 200 mL    Lactated Ringers: 1700 mL    Nepro with Carb Steady: 1020 mL  Total IN: 3970 mL    OUT:    Rectal Tube (mL): 200 mL    Voided (mL): 1625 mL  Total OUT: 1825 mL    Total NET: 2145 mL        I&O's Summary    05 Oct 2024 07:01  -  06 Oct 2024 07:00  --------------------------------------------------------  IN: 5185 mL / OUT: 2175 mL / NET: 3010 mL    06 Oct 2024 07:01  -  07 Oct 2024 01:50  --------------------------------------------------------  IN: 3970 mL / OUT: 1825 mL / NET: 2145 mL        PHYSICAL EXAM:  General: patient seen laying supine in bed in NAD  Neuro: does not OEs, does not FC, +L corneal, absent R corneal reflex, +cough/gag, LUE and RUE extensor to noxious, RLE and LLE TF to noxious  HEENT: PERRL  Neck: supple  Cardiac: RRR, S1S2  Pulmonary: chest rise symmetric, +intubated  Abdomen: soft, nontender, nondistended, +OGT  Ext: perfusing well  Skin: warm, dry        LABS:                        11.2   7.13  )-----------( 203      ( 06 Oct 2024 05:19 )             36.5     10-06    154[H]  |  118[H]  |  46[H]  ----------------------------<  107[H]  4.0   |  24  |  4.96[H]    Ca    8.0[L]      06 Oct 2024 05:19  Phos  4.4     10-06  Mg     2.2     10-06        Urinalysis Basic - ( 06 Oct 2024 05:19 )    Color: x / Appearance: x / SG: x / pH: x  Gluc: 107 mg/dL / Ketone: x  / Bili: x / Urobili: x   Blood: x / Protein: x / Nitrite: x   Leuk Esterase: x / RBC: x / WBC x   Sq Epi: x / Non Sq Epi: x / Bacteria: x          CAPILLARY BLOOD GLUCOSE          Drug Levels: [] N/A    CSF Analysis: [] N/A      Allergies    Allergy Status Unknown    Intolerances      MEDICATIONS:  Antibiotics:  ceFAZolin   IVPB 1000 milliGRAM(s) IV Intermittent every 12 hours    Neuro:  acetaminophen   Oral Liquid .. 650 milliGRAM(s) Oral every 6 hours PRN  fentaNYL    Injectable 25 MICROGram(s) IV Push every 2 hours PRN    Anticoagulation:  heparin   Injectable 5000 Unit(s) SubCutaneous every 8 hours    OTHER:  acetylcysteine 10%  Inhalation 4 milliLiter(s) Inhalation every 6 hours  albuterol/ipratropium for Nebulization 3 milliLiter(s) Nebulizer every 6 hours  amLODIPine   Tablet 10 milliGRAM(s) Oral daily  chlorhexidine 0.12% Liquid 15 milliLiter(s) Oral Mucosa every 12 hours  chlorhexidine 2% Cloths 1 Application(s) Topical <User Schedule>  hydrALAZINE 25 milliGRAM(s) Oral every 8 hours  labetalol 200 milliGRAM(s) Enteral Tube every 8 hours  pantoprazole  Injectable 40 milliGRAM(s) IV Push daily  petrolatum Ophthalmic Ointment 1 Application(s) Both EYES two times a day    IVF:  lactated ringers. 1000 milliLiter(s) IV Continuous <Continuous>    CULTURES:  Culture Results:   Numerous Staphylococcus aureus  Normal Respiratory Miranda present (10-01 @ 19:54)  Culture Results:   No growth at 4 days (10-01 @ 17:46)    RADIOLOGY & ADDITIONAL TESTS:    AMS    Handoff    Acute myocardial infarction    CVA (cerebral vascular accident)    Hemorrhagic stroke    Brainstem stroke    Encephalopathy acute    Functional quadriplegia    Advanced care planning/counseling discussion    Encounter for palliative care    AMS    [email or web address]        ASSESSMENT:  43 y/o PMH ?stroke/MI present to [facility] after collapsing at work. Decorticate posturing, vomiting, intubated for airway protection. 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 Transferred to [facility] for further management    Neuro:  - neuro/vitals q1  - pain control: fentanyl 25q2 prn, tylenol  - CTH 9/30: enlarged pontine hemorrhage, CTH 10/3 done read stable   - vEEG (10/3-4)-neg  - stroke core measures, stroke neuro following  - palliative and ethics following, attempting to establish HCP  - MRI brain w/ w/o contrast pending    CV:  - SBP<160  - HTN: norvasc 10mg, labetalol 200q8, hydralazine 25q8  - echo (9/30) EF 75%    Resp:  - Intubated, FVS  - duonebs/mucomyst q6    GI:  - TF via OGT, nepro, banatrol   - bowel reg held for diarrhea, RT (10/5- )   - protonix while intubated    Renal:  - LR@100cc/hr  - Na 150-155, hypernatremia:  q4hrs  - voiding  - trend BUN/Cr: possible CKD? renal consult day 10/4  - renal US 10/1: echogenicity c/w chronic med renal dz  - nephro following, recs appreciated    Endo:  - ISS  - a1c 5.4    Heme:  - DVT ppx: SCDs, SQH 5000q8    ID:  - pan cx 10/1  - MRSA swab negative, sputum MSSA + , s/p 1 dose vanc (10/2), CTX (10/2 - 10/4), Ancef (10/4- )     Dispo: NSICU, full code    D/w Dr. D'Amico, Dr. Reveles      Assessment:  Present when checked    []  GCS  E   V  M     Heart Failure: []Acute, [] acute on chronic , []chronic  Heart Failure:  [] Diastolic (HFpEF), [] Systolic (HFrEF), []Combined (HFpEF and HFrEF), [] RHF, [] Pulm HTN, [] Other    [] ANIKA, [] ATN, [] AIN, [] other  [] CKD1, [] CKD2, [] CKD 3, [] CKD 4, [] CKD 5, []ESRD    Encephalopathy: [] Metabolic, [] Hepatic, [] toxic, [] Neurological, [] Other    Abnormal Nurtitional Status: [] malnurtition (see nutrition note), [ ]underweight: BMI < 19, [] morbid obesity: BMI >40, [] Cachexia    [] Sepsis  [] hypovolemic shock,[] cardiogenic shock, [] hemorrhagic shock, [] neuogenic shock  [] Acute Respiratory Failure  []Cerebral edema, [] Brain compression/ herniation,   [] Functional quadriplegia  [] Acute blood loss anemia

## 2024-10-08 PROBLEM — Z00.00 ENCOUNTER FOR PREVENTIVE HEALTH EXAMINATION: Status: ACTIVE | Noted: 2024-10-08

## 2024-10-08 LAB
ANION GAP SERPL CALC-SCNC: 11 MMOL/L — SIGNIFICANT CHANGE UP (ref 5–17)
BASE EXCESS BLDA CALC-SCNC: -2.2 MMOL/L — LOW (ref -2–3)
BASE EXCESS BLDA CALC-SCNC: -2.7 MMOL/L — LOW (ref -2–3)
BASE EXCESS BLDA CALC-SCNC: -3.2 MMOL/L — LOW (ref -2–3)
BUN SERPL-MCNC: 58 MG/DL — HIGH (ref 7–23)
CALCIUM SERPL-MCNC: 8.4 MG/DL — SIGNIFICANT CHANGE UP (ref 8.4–10.5)
CHLORIDE SERPL-SCNC: 110 MMOL/L — HIGH (ref 96–108)
CO2 BLDA-SCNC: 21 MMOL/L — SIGNIFICANT CHANGE UP (ref 19–24)
CO2 BLDA-SCNC: 22 MMOL/L — SIGNIFICANT CHANGE UP (ref 19–24)
CO2 BLDA-SCNC: 24 MMOL/L — SIGNIFICANT CHANGE UP (ref 19–24)
CO2 SERPL-SCNC: 24 MMOL/L — SIGNIFICANT CHANGE UP (ref 22–31)
CREAT ?TM UR-MCNC: 60 MG/DL — SIGNIFICANT CHANGE UP
CREAT SERPL-MCNC: 6.18 MG/DL — HIGH (ref 0.5–1.3)
CULTURE RESULTS: ABNORMAL
CYSTATIN C SERPL-MCNC: 3.23 MG/L — HIGH (ref 0.68–1.22)
EGFR: 11 ML/MIN/1.73M2 — LOW
EGFR: 11 ML/MIN/1.73M2 — LOW
GFR/BSA.PRED SERPLBLD CYS-BASED-ARV: 17 ML/MIN/1.73M2 — LOW
GLUCOSE BLDC GLUCOMTR-MCNC: 104 MG/DL — HIGH (ref 70–99)
GLUCOSE BLDC GLUCOMTR-MCNC: 109 MG/DL — HIGH (ref 70–99)
GLUCOSE SERPL-MCNC: 126 MG/DL — HIGH (ref 70–99)
HCO3 BLDA-SCNC: 20 MMOL/L — LOW (ref 21–28)
HCO3 BLDA-SCNC: 21 MMOL/L — SIGNIFICANT CHANGE UP (ref 21–28)
HCO3 BLDA-SCNC: 22 MMOL/L — SIGNIFICANT CHANGE UP (ref 21–28)
HCT VFR BLD CALC: 33.5 % — LOW (ref 39–50)
HGB BLD-MCNC: 10.3 G/DL — LOW (ref 13–17)
LACTATE SERPL-SCNC: 0.7 MMOL/L — SIGNIFICANT CHANGE UP (ref 0.5–2)
MAGNESIUM SERPL-MCNC: 2.2 MG/DL — SIGNIFICANT CHANGE UP (ref 1.6–2.6)
MCHC RBC-ENTMCNC: 27.8 PG — SIGNIFICANT CHANGE UP (ref 27–34)
MCHC RBC-ENTMCNC: 30.7 GM/DL — LOW (ref 32–36)
MCV RBC AUTO: 90.5 FL — SIGNIFICANT CHANGE UP (ref 80–100)
NRBC # BLD: 0 /100 WBCS — SIGNIFICANT CHANGE UP (ref 0–0)
NRBC BLD-RTO: 0 /100 WBCS — SIGNIFICANT CHANGE UP (ref 0–0)
PCO2 BLDA: 29 MMHG — LOW (ref 35–48)
PCO2 BLDA: 31 MMHG — LOW (ref 35–48)
PCO2 BLDA: 37 MMHG — SIGNIFICANT CHANGE UP (ref 35–48)
PH BLDA: 7.39 — SIGNIFICANT CHANGE UP (ref 7.35–7.45)
PH BLDA: 7.43 — SIGNIFICANT CHANGE UP (ref 7.35–7.45)
PH BLDA: 7.44 — SIGNIFICANT CHANGE UP (ref 7.35–7.45)
PHOSPHATE SERPL-MCNC: 5.9 MG/DL — HIGH (ref 2.5–4.5)
PLATELET # BLD AUTO: 199 K/UL — SIGNIFICANT CHANGE UP (ref 150–400)
PO2 BLDA: 105 MMHG — SIGNIFICANT CHANGE UP (ref 83–108)
PO2 BLDA: 118 MMHG — HIGH (ref 83–108)
PO2 BLDA: 127 MMHG — HIGH (ref 83–108)
POTASSIUM SERPL-MCNC: 3.9 MMOL/L — SIGNIFICANT CHANGE UP (ref 3.5–5.3)
POTASSIUM SERPL-SCNC: 3.9 MMOL/L — SIGNIFICANT CHANGE UP (ref 3.5–5.3)
RBC # BLD: 3.7 M/UL — LOW (ref 4.2–5.8)
RBC # FLD: 12.8 % — SIGNIFICANT CHANGE UP (ref 10.3–14.5)
SAO2 % BLDA: 98.4 % — HIGH (ref 94–98)
SAO2 % BLDA: 98.6 % — HIGH (ref 94–98)
SAO2 % BLDA: 98.8 % — HIGH (ref 94–98)
SODIUM SERPL-SCNC: 145 MMOL/L — SIGNIFICANT CHANGE UP (ref 135–145)
SODIUM UR-SCNC: 97 MMOL/L — SIGNIFICANT CHANGE UP
SPECIMEN SOURCE: SIGNIFICANT CHANGE UP
UUN UR-MCNC: 362 MG/DL — SIGNIFICANT CHANGE UP
WBC # BLD: 9.5 K/UL — SIGNIFICANT CHANGE UP (ref 3.8–10.5)
WBC # FLD AUTO: 9.5 K/UL — SIGNIFICANT CHANGE UP (ref 3.8–10.5)

## 2024-10-08 PROCEDURE — 99291 CRITICAL CARE FIRST HOUR: CPT

## 2024-10-08 PROCEDURE — 76770 US EXAM ABDO BACK WALL COMP: CPT | Mod: 26

## 2024-10-08 PROCEDURE — 71045 X-RAY EXAM CHEST 1 VIEW: CPT | Mod: 26

## 2024-10-08 RX ORDER — TAMSULOSIN HYDROCHLORIDE 0.4 MG/1
0.4 CAPSULE ORAL AT BEDTIME
Refills: 0 | Status: DISCONTINUED | OUTPATIENT
Start: 2024-10-08 | End: 2024-10-13

## 2024-10-08 RX ORDER — SODIUM CHLORIDE 9 G/1000ML
1000 INJECTION, SOLUTION INTRAVENOUS
Refills: 0 | Status: DISCONTINUED | OUTPATIENT
Start: 2024-10-08 | End: 2024-10-09

## 2024-10-08 RX ORDER — LABETALOL HYDROCHLORIDE 200 MG/1
300 TABLET, FILM COATED ORAL EVERY 8 HOURS
Refills: 0 | Status: DISCONTINUED | OUTPATIENT
Start: 2024-10-08 | End: 2024-10-19

## 2024-10-08 RX ORDER — SIMETHICONE 80 MG
80 TABLET,CHEWABLE ORAL EVERY 6 HOURS
Refills: 0 | Status: DISCONTINUED | OUTPATIENT
Start: 2024-10-08 | End: 2024-10-17

## 2024-10-08 RX ORDER — DEXMEDETOMIDINE HYDROCHLORIDE IN SODIUM CHLORIDE 4 UG/ML
0.2 INJECTION INTRAVENOUS
Qty: 400 | Refills: 0 | Status: DISCONTINUED | OUTPATIENT
Start: 2024-10-08 | End: 2024-10-09

## 2024-10-08 RX ADMIN — HEPARIN SODIUM 5000 UNIT(S): 1000 INJECTION INTRAVENOUS; SUBCUTANEOUS at 05:28

## 2024-10-08 RX ADMIN — Medication 15 MILLILITER(S): at 07:36

## 2024-10-08 RX ADMIN — IPRATROPIUM BROMIDE AND ALBUTEROL SULFATE 3 MILLILITER(S): .5; 2.5 SOLUTION RESPIRATORY (INHALATION) at 17:25

## 2024-10-08 RX ADMIN — Medication 80 MILLIGRAM(S): at 23:56

## 2024-10-08 RX ADMIN — LABETALOL HYDROCHLORIDE 300 MILLIGRAM(S): 200 TABLET, FILM COATED ORAL at 15:55

## 2024-10-08 RX ADMIN — AMLODIPINE BESYLATE 10 MILLIGRAM(S): 10 TABLET ORAL at 05:29

## 2024-10-08 RX ADMIN — Medication 100 MILLIGRAM(S): at 23:46

## 2024-10-08 RX ADMIN — Medication 40 MILLIGRAM(S): at 11:06

## 2024-10-08 RX ADMIN — Medication 15 MILLILITER(S): at 05:28

## 2024-10-08 RX ADMIN — Medication 667 MILLIGRAM(S): at 11:07

## 2024-10-08 RX ADMIN — DEXMEDETOMIDINE HYDROCHLORIDE IN SODIUM CHLORIDE 4 MICROGRAM(S)/KG/HR: 4 INJECTION INTRAVENOUS at 15:31

## 2024-10-08 RX ADMIN — Medication 1 APPLICATION(S): at 17:45

## 2024-10-08 RX ADMIN — ACETYLCYSTEINE 4 MILLILITER(S): 200 INHALANT RESPIRATORY (INHALATION) at 09:36

## 2024-10-08 RX ADMIN — HEPARIN SODIUM 5000 UNIT(S): 1000 INJECTION INTRAVENOUS; SUBCUTANEOUS at 23:47

## 2024-10-08 RX ADMIN — IPRATROPIUM BROMIDE AND ALBUTEROL SULFATE 3 MILLILITER(S): .5; 2.5 SOLUTION RESPIRATORY (INHALATION) at 09:53

## 2024-10-08 RX ADMIN — IPRATROPIUM BROMIDE AND ALBUTEROL SULFATE 3 MILLILITER(S): .5; 2.5 SOLUTION RESPIRATORY (INHALATION) at 04:36

## 2024-10-08 RX ADMIN — ACETYLCYSTEINE 4 MILLILITER(S): 200 INHALANT RESPIRATORY (INHALATION) at 17:25

## 2024-10-08 RX ADMIN — Medication 80 MILLIGRAM(S): at 17:00

## 2024-10-08 RX ADMIN — LABETALOL HYDROCHLORIDE 300 MILLIGRAM(S): 200 TABLET, FILM COATED ORAL at 06:20

## 2024-10-08 RX ADMIN — Medication 100 MILLIGRAM(S): at 10:36

## 2024-10-08 RX ADMIN — ACETYLCYSTEINE 4 MILLILITER(S): 200 INHALANT RESPIRATORY (INHALATION) at 04:36

## 2024-10-08 RX ADMIN — Medication 50 MILLIGRAM(S): at 23:46

## 2024-10-08 RX ADMIN — Medication 15 MILLILITER(S): at 17:00

## 2024-10-08 RX ADMIN — Medication 50 MILLIGRAM(S): at 14:04

## 2024-10-08 RX ADMIN — Medication 25 MILLIGRAM(S): at 00:59

## 2024-10-08 RX ADMIN — LABETALOL HYDROCHLORIDE 300 MILLIGRAM(S): 200 TABLET, FILM COATED ORAL at 23:46

## 2024-10-08 RX ADMIN — Medication 80 MILLIGRAM(S): at 05:29

## 2024-10-08 RX ADMIN — Medication 1 APPLICATION(S): at 05:30

## 2024-10-08 RX ADMIN — Medication 667 MILLIGRAM(S): at 17:00

## 2024-10-08 RX ADMIN — Medication 50 MILLIGRAM(S): at 05:28

## 2024-10-08 RX ADMIN — Medication 80 MILLIGRAM(S): at 11:05

## 2024-10-08 RX ADMIN — Medication 667 MILLIGRAM(S): at 07:55

## 2024-10-08 RX ADMIN — TAMSULOSIN HYDROCHLORIDE 0.4 MILLIGRAM(S): 0.4 CAPSULE ORAL at 23:53

## 2024-10-08 RX ADMIN — HEPARIN SODIUM 5000 UNIT(S): 1000 INJECTION INTRAVENOUS; SUBCUTANEOUS at 14:04

## 2024-10-08 NOTE — PROGRESS NOTE ADULT - ASSESSMENT
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 CPAP as tolerated  ABG, CXR  VAP bundle  ABG    CV:  SBP goal 100-160  labetalol 200mg tid  amlodipine 10  [place]      RENAL:   ANIKA though suspect CKD  Fluids: LR at 80cc/hr  Vuong catheter in place; monitor Is/Os  Na goal 140-150  BMPq12  Nephrology following  [id number]      GI:  Diet: TF  GI prophylaxis [] not indicated [x] PPI [] other:  Bowel regimen [] colace [x] senna [] other: miralax  Monitor LFTs    ENDO:   Goal euglycemia (-180)      HEME/ONC:  VTE prophylaxis: [x] SCDs [x] chemoprophylaxis       ID:   Mild leukocytosis  f/u pan culture  Ancef (10/2 - ) x7d  MRSA swab neg  f/u pan culture

## 2024-10-08 NOTE — PROGRESS NOTE ADULT - SUBJECTIVE AND OBJECTIVE BOX
S/Overnight events:  Unable to obtain subjective data 2/2 pt's neurologic status.       Hospital Course:   9/30: transferred from [facility]. A line placed. Versed dc'd. Cy Rader at bedside, states pt has family in [place], cannot confirm medications or PMH other than stroke and MI. 250cc bolus 3% given. LR switched to NS. hydralazine 25q8 started, 3% started, switched propofol to precedex   10/1: stability CTH done. Added labetalol, started TF. Palliative consulted. ethics consulted to determine surrogate. febrile 103, pan cx sent  10/2: BD 2, GEORGE overnight. TF resumed. Desatt'd to 80s, FiO2 inc. to 50. Fentanyl given, ABG, CXR ordered. Maxxed on precedex, started on propofol for DARIEN -4 - -5. Precedex dc'd. Duonebs, mucomyst, hypertonic added. 3% dc'd. Cardene dc'd. Start vanc/CTX. Increased labetalol 200q8. MRSA negative, dc'd vanc. ETT pulled back 2cm x 2, good positioning after confirmatory chest xray. Ethics attempting to establish HCP with family. Na 159, starting FW 250q6 for range 150-155.   10/3: BD3, GEORGE o/n, neuro stable. Na elevating, FW increased to 300q6. Dc'd bowel reg for diarrhea. vEEG started. SQH 5000q8 tonight.   10/4: BD 4, albumn bolus, incr. LR to 80 2/2 incr. in Cr, LR to 100cc/hr for uptrending Cr. Started 7% hypersal for 48hrs and SL atropine for inline/oral thick secretions. Dc'd CTX and started ancef for MSSA in the sputum. Nephrology consulted for CKD, f/u recs. SBP 170s, given hydralazine 10mg IVP.   10/5: BD5, o/n 10mg IVP hydralazine given for SBP 170s and started on hydralazine 25q8 via OGT. 10mg IV push labetalol for SBP > 160s. RT placed for diarrhea.   10/6: BD6, o/n FW increased to 350q4 per nephrology recs. IV tylenol for temp 100.6, SBp 160s presumed uncomfortable.   10/7: BD7, overnight pancultured for temp 101.8F.   10/8: BD8. GEORGE. Cr bumped. decreased LR to 75cc/hr. Adding simethicone ATC. incr hydralazine 50mgTID. Incr labetalol 300mgTID.       Vital Signs Last 24 Hrs  T(C): 36.4 (08 Oct 2024 00:52), Max: 38.9 (07 Oct 2024 04:38)  T(F): 97.5 (08 Oct 2024 00:52), Max: 102 (07 Oct 2024 04:38)  HR: 83 (07 Oct 2024 21:00) (83 - 105)  BP: 152/92 (07 Oct 2024 21:00) (152/92 - 172/101)  BP(mean): 116 (07 Oct 2024 21:00) (92 - 133)  RR: 19 (07 Oct 2024 21:00) (14 - 22)  SpO2: 99% (07 Oct 2024 21:00) (94% - 99%)    Parameters below as of 08 Oct 2024 02:00  Patient On (Oxygen Delivery Method): ventilator    O2 Concentration (%): 50    I&O's Detail    06 Oct 2024 07:01  -  07 Oct 2024 07:00  --------------------------------------------------------  IN:    Free Water: 1400 mL    IV PiggyBack: 200 mL    Lactated Ringers: 2400 mL    Nepro with Carb Steady: 1440 mL  Total IN: 5440 mL    OUT:    Rectal Tube (mL): 400 mL    Voided (mL): 2050 mL  Total OUT: 2450 mL    Total NET: 2990 mL      07 Oct 2024 07:01  -  08 Oct 2024 01:58  --------------------------------------------------------  IN:    Lactated Ringers: 1000 mL    Nepro with Carb Steady: 660 mL  Total IN: 1660 mL    OUT:    Rectal Tube (mL): 100 mL    Voided (mL): 790 mL  Total OUT: 890 mL    Total NET: 770 mL        I&O's Summary    06 Oct 2024 07:01  -  07 Oct 2024 07:00  --------------------------------------------------------  IN: 5440 mL / OUT: 2450 mL / NET: 2990 mL    07 Oct 2024 07:01  -  08 Oct 2024 01:58  --------------------------------------------------------  IN: 1660 mL / OUT: 890 mL / NET: 770 mL        PHYSICAL EXAM:  Constitutional: NAD, intubated  Respiratory: breathing non-labored, symmetrical chest wall movement  Cardiovascuar: RRR, no murmurs  Gastrointestinal: abdomen soft, non tender, OGT  Neurological:  Pupils 2mm brisk bilaterally via pupillometer  + L corneal, negative R corneal  +cough/gag  UE bilaterally extend to noxious  LE bilaterally withdraw to noxious    Incision/Wound:    DEVICE/DRAIN DRESSING:    TUBES/LINES:  [] CVC  [x] A-line  [] Lumbar Drain  [] Ventriculostomy  [] Other    DIET:  [] NPO  [] Mechanical  [x] Tube feeds    LABS:                        11.1   8.75  )-----------( 200      ( 07 Oct 2024 04:39 )             36.8     10-07    149[H]  |  115[H]  |  52[H]  ----------------------------<  119[H]  4.1   |  23  |  6.03[H]    Ca    8.1[L]      07 Oct 2024 04:39  Phos  4.2     10-07  Mg     2.2     10-07        Urinalysis Basic - ( 07 Oct 2024 04:39 )    Color: x / Appearance: x / SG: x / pH: x  Gluc: 119 mg/dL / Ketone: x  / Bili: x / Urobili: x   Blood: x / Protein: x / Nitrite: x   Leuk Esterase: x / RBC: x / WBC x   Sq Epi: x / Non Sq Epi: x / Bacteria: x          CAPILLARY BLOOD GLUCOSE          Drug Levels: [] N/A    CSF Analysis: [] N/A      Allergies    Allergy Status Unknown    Intolerances      MEDICATIONS:  Antibiotics:  ceFAZolin   IVPB 1000 milliGRAM(s) IV Intermittent every 12 hours    Neuro:  acetaminophen   Oral Liquid .. 650 milliGRAM(s) Oral every 6 hours PRN  fentaNYL    Injectable 25 MICROGram(s) IV Push every 2 hours PRN    Anticoagulation:  heparin   Injectable 5000 Unit(s) SubCutaneous every 8 hours    OTHER:  acetylcysteine 10%  Inhalation 4 milliLiter(s) Inhalation every 6 hours  albuterol/ipratropium for Nebulization 3 milliLiter(s) Nebulizer every 6 hours  amLODIPine   Tablet 10 milliGRAM(s) Oral daily  chlorhexidine 0.12% Liquid 15 milliLiter(s) Oral Mucosa every 12 hours  chlorhexidine 2% Cloths 1 Application(s) Topical <User Schedule>  hydrALAZINE 50 milliGRAM(s) Oral every 8 hours  labetalol 300 milliGRAM(s) Enteral Tube every 8 hours  pantoprazole  Injectable 40 milliGRAM(s) IV Push daily  petrolatum Ophthalmic Ointment 1 Application(s) Both EYES two times a day  simethicone 80 milliGRAM(s) Chew every 6 hours    IVF:  lactated ringers. 1000 milliLiter(s) IV Continuous <Continuous>    CULTURES:  Culture Results:   Numerous Staphylococcus aureus  Normal Respiratory Miranda present (10-01 @ 19:54)  Culture Results:   No growth at 5 days (10-01 @ 17:46)    RADIOLOGY & ADDITIONAL TESTS:      ASSESSMENT:  43 y/o PMH ?stroke/MI present to [facility] after collapsing at work. Decorticate posturing, vomiting, intubated for airway protection. 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 Transferred to [facility] for further management    Neuro:  - neuro/vitals q1  - pain control: fentanyl 25q2 prn, tylenol  - CTH 9/30: enlarged pontine hemorrhage, CTH 10/3 done read stable   - vEEG (10/3-4)-neg  - stroke core measures, stroke neuro following  - palliative and ethics following, attempting to establish HCP  - MRI brain w/ w/o contrast pending    CV:  - SBP<160  - HTN: norvasc 10mg, labetalol 300q8, hydralazine 50q8  - echo (9/30) EF 75%    Resp:  - Intubated, FVS  - duonebs/mucomyst q6    GI:  - TF via OGT, nepro, banatrol   - bowel reg held for diarrhea, RT (10/5- )   - protonix while intubated  - simethicone standing    Renal:  - LR@75cc/hr  - Na 150-155, hypernatremia:  q4hrs  - voiding  - trend BUN/Cr: possible CKD? renal consult day 10/4  - renal US 10/1: echogenicity c/w chronic med renal dz  - nephro following, recs appreciated     Endo:  - ISS  - a1c 5.4    Heme:  - DVT ppx: SCDs, SQH 5000q8    ID:  - pan cx 10/1, 10/7  - MRSA swab negative, sputum MSSA + , s/p 1 dose vanc (10/2), CTX (10/2 - 10/4), Ancef (10/4- )     Dispo: NSICU, full code    D/w Dr. D'Amico, Dr. Reveles

## 2024-10-08 NOTE — PROGRESS NOTE ADULT - SUBJECTIVE AND OBJECTIVE BOX
NEUROCRITICAL CARE EVENING NOTE    DAY EVENTS:    - ongoing [facility]      VITALS/IMAGING/DATA  - Reviewed    ALLERGIES:   - Reviewed      MEDICATIONS:  - Reviewed    Physical Exam:  Unchanged from day time, please refer to note.

## 2024-10-08 NOTE — PROGRESS NOTE ADULT - ASSESSMENT
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    NEURO:  ICH score 3   -neuro check q4  -on going GOC  -PT/OT evaluation if signs of responsiveness   -sedation w/ precedex RASS goal -2 to -3    PULMONARY:  Currently requiring mechanical ventilation for air-way protection; CPAP trials as tolerated  check abg and adjust accordingly   would recommend early tracheostomy if within GOC    CARDIOVASCULAR:  monitor on telemetry   vitals q1  sbp goal 100-160  -c/w amlodipine 10mg, labetalol 300mg TID, hydralazine 50mg TID   TTE w/ hypertensive changes     GASTROENTEROLOGY:  would recommend PEG if within GOC  GI ppx : Protonix 40mg qd  loose stools, c/w rectal tube; d/c bowel regimen     RENAL/:  ANIKA on likely CKD ; likely 2/2 contrast induced nephropathy   -nephrology consulted  -check BMP qd  -strict i/o's ; c/w Vuong   -Na goal 135-145; c/w free water 250cc q6     ENDOCRINE:  A1c- 5.3    HEME/ONC:  DVT ppx: SQH  b/l SCDs    INFECTIOUS:   Monitor for fevers; off abx at this time     Code statues: full code, ongoing GOC w/ palliative

## 2024-10-08 NOTE — PROGRESS NOTE ADULT - SUBJECTIVE AND OBJECTIVE BOX
INTERVAL HISTORY: HPI:  Unknown age male, unknown past medical history (reported stroke and MI by coworkers) presented to [facility] with AMS, Pt was working at [employer] when he was found down by coworkers. EMS called and pt brought to [facility] ED. Intubated, sedated, started on cardene for SBPs in 200s. CT head showed brain stem bleed. Transferred to NSICU for further management.  (30 Sep 2024 12:55)      MEDICATIONS  (STANDING):  acetylcysteine 10%  Inhalation 4 milliLiter(s) Inhalation every 6 hours  albuterol/ipratropium for Nebulization 3 milliLiter(s) Nebulizer every 6 hours  amLODIPine   Tablet 10 milliGRAM(s) Oral daily  calcium acetate 667 milliGRAM(s) Oral three times a day with meals  ceFAZolin   IVPB 1000 milliGRAM(s) IV Intermittent every 12 hours  chlorhexidine 0.12% Liquid 15 milliLiter(s) Oral Mucosa every 12 hours  chlorhexidine 2% Cloths 1 Application(s) Topical <User Schedule>  dexMEDEtomidine Infusion 0.2 MICROgram(s)/kG/Hr (4 mL/Hr) IV Continuous <Continuous>  heparin   Injectable 5000 Unit(s) SubCutaneous every 8 hours  hydrALAZINE 50 milliGRAM(s) Oral every 8 hours  labetalol 300 milliGRAM(s) Enteral Tube every 8 hours  lactated ringers. 1000 milliLiter(s) (75 mL/Hr) IV Continuous <Continuous>  pantoprazole  Injectable 40 milliGRAM(s) IV Push daily  petrolatum Ophthalmic Ointment 1 Application(s) Both EYES two times a day  simethicone 80 milliGRAM(s) Chew every 6 hours    MEDICATIONS  (PRN):  acetaminophen   Oral Liquid .. 650 milliGRAM(s) Oral every 6 hours PRN Temp greater or equal to 38C (100.4F), Mild Pain (1 - 3)  fentaNYL    Injectable 25 MICROGram(s) IV Push every 2 hours PRN agitation, severe pain, vent desynchrony      Drug Dosing Weight  Height (cm): 172.7 (30 Sep 2024 08:39)  Weight (kg): 80 (30 Sep 2024 09:04)  BMI (kg/m2): 26.8 (30 Sep 2024 09:04)  BSA (m2): 1.94 (30 Sep 2024 09:04)    PAST MEDICAL & SURGICAL HISTORY:  Acute myocardial infarction  CVA (cerebral vascular accident)    REVIEW OF SYSTEMS: [ ] Unable to Assess due to neurologic exam   [ ] All ROS addressed below are non-contributory, except:  Neuro: [ ] Headache [ ] Back pain [ ] Numbness [ ] Weakness [ ] Ataxia [ ] Dizziness [ ] Aphasia [ ] Dysarthria [ ] Visual disturbance  Resp: [ ] Shortness of breath/dyspnea, [ ] Orthopnea [ ] Cough  CV: [ ] Chest pain [ ] Palpitation [ ] Lightheadedness [ ] Syncope  Renal: [ ] Thirst [ ] Edema  GI: [ ] Nausea [ ] Emesis [ ] Abdominal pain [ ] Constipation [ ] Diarrhea  Hem: [ ] Hematemesis [ ] bright red blood per rectum  ID: [ ] Fever [ ] Chills [ ] Dysuria  ENT: [ ] Rhinorrhea    PHYSICAL EXAM:    General: intubated   Neurological: dysconjugate gaze, ? following with eyes(at times looks down to command), negative dolls, absent R corneal reflex, L is still present, does not doll to movement of head, strong cough, able to maintain spontaneous breaths on CPAP, RUE extension, LUE minimal movement to nox, b/l LE WD to nox,  Pulmonary: Clear to Auscultation, No Rales, No Rhonchi, No Wheezes   Cardiovascular: S1, S2, Regular Rate and Rhythm   Gastrointestinal: Soft, Nontender, Nondistended   Extremities: No LE edema       ICU Vital Signs Last 24 Hrs  T(C): 36.8 (08 Oct 2024 14:44), Max: 38.7 (07 Oct 2024 17:19)  T(F): 98.2 (08 Oct 2024 14:44), Max: 101.7 (07 Oct 2024 17:19)  HR: 85 (08 Oct 2024 15:00) (50 - 98)  BP: 156/102 (08 Oct 2024 15:00) (121/89 - 172/101)  BP(mean): 123 (08 Oct 2024 15:00) (102 - 129)  ABP: 163/101 (08 Oct 2024 15:00) (144/95 - 232/170)  ABP(mean): 123 (08 Oct 2024 15:00) (114 - 192)  RR: 12 (08 Oct 2024 15:00) (12 - 20)  SpO2: 96% (08 Oct 2024 15:00) (94% - 100%)    O2 Parameters below as of 08 Oct 2024 15:00  Patient On (Oxygen Delivery Method): ventilator    O2 Concentration (%): 40      ABG - ( 08 Oct 2024 15:15 )  pH, Arterial: 7.43  pH, Blood: x     /  pCO2: 31    /  pO2: 105   / HCO3: 21    / Base Excess: -2.7  /  SaO2: 98.6        I&O's Detail    07 Oct 2024 07:01  -  08 Oct 2024 07:00  --------------------------------------------------------  IN:    Lactated Ringers: 1100 mL    Lactated Ringers: 525 mL    Nepro with Carb Steady: 1080 mL  Total IN: 2705 mL    OUT:    Rectal Tube (mL): 100 mL    Voided (mL): 1690 mL  Total OUT: 1790 mL    Total NET: 915 mL      08 Oct 2024 07:01  -  08 Oct 2024 15:27  --------------------------------------------------------  IN:    Dexmedetomidine: 8 mL    Free Water: 280 mL    IV PiggyBack: 50 mL    Lactated Ringers: 600 mL    Nepro with Carb Steady: 300 mL  Total IN: 1238 mL    OUT:    Rectal Tube (mL): 300 mL    Voided (mL): 1045 mL  Total OUT: 1345 mL    Total NET: -107 mL        Mode: AC/ CMV (Assist Control/ Continuous Mandatory Ventilation)  RR (machine): 12  TV (machine): 430  FiO2: 50  PEEP: 5  ITime: 1  MAP: 9.5  PIP: 16        LABS:  CBC Full  -  ( 08 Oct 2024 04:42 )  WBC Count : 9.50 K/uL  RBC Count : 3.70 M/uL  Hemoglobin : 10.3 g/dL  Hematocrit : 33.5 %  Platelet Count - Automated : 199 K/uL  Mean Cell Volume : 90.5 fl  Mean Cell Hemoglobin : 27.8 pg  Mean Cell Hemoglobin Concentration : 30.7 gm/dL  Auto Neutrophil # : x  Auto Lymphocyte # : x  Auto Monocyte # : x  Auto Eosinophil # : x  Auto Basophil # : x  Auto Neutrophil % : x  Auto Lymphocyte % : x  Auto Monocyte % : x  Auto Eosinophil % : x  Auto Basophil % : x    10-08    145  |  110[H]  |  58[H]  ----------------------------<  126[H]  3.9   |  24  |  6.18[H]    Ca    8.4      08 Oct 2024 04:42  Phos  5.9     10-08  Mg     2.2     10-08        Urinalysis Basic - ( 08 Oct 2024 04:42 )    Color: x / Appearance: x / SG: x / pH: x  Gluc: 126 mg/dL / Ketone: x  / Bili: x / Urobili: x   Blood: x / Protein: x / Nitrite: x   Leuk Esterase: x / RBC: x / WBC x   Sq Epi: x / Non Sq Epi: x / Bacteria: x    RADIOLOGY & ADDITIONAL STUDIES:

## 2024-10-09 LAB
ANION GAP SERPL CALC-SCNC: 12 MMOL/L — SIGNIFICANT CHANGE UP (ref 5–17)
BASE EXCESS BLDA CALC-SCNC: -1.3 MMOL/L — SIGNIFICANT CHANGE UP (ref -2–3)
BUN SERPL-MCNC: 59 MG/DL — HIGH (ref 7–23)
CALCIUM SERPL-MCNC: 7.9 MG/DL — LOW (ref 8.4–10.5)
CHLORIDE SERPL-SCNC: 111 MMOL/L — HIGH (ref 96–108)
CO2 BLDA-SCNC: 25 MMOL/L — HIGH (ref 19–24)
CO2 SERPL-SCNC: 20 MMOL/L — LOW (ref 22–31)
CREAT SERPL-MCNC: 5.67 MG/DL — HIGH (ref 0.5–1.3)
EGFR: 12 ML/MIN/1.73M2 — LOW
EGFR: 12 ML/MIN/1.73M2 — LOW
GLUCOSE SERPL-MCNC: 110 MG/DL — HIGH (ref 70–99)
HCO3 BLDA-SCNC: 24 MMOL/L — SIGNIFICANT CHANGE UP (ref 21–28)
HCT VFR BLD CALC: 34.2 % — LOW (ref 39–50)
HGB BLD-MCNC: 10.6 G/DL — LOW (ref 13–17)
HOROWITZ INDEX BLDA+IHG-RTO: 50 — SIGNIFICANT CHANGE UP
MAGNESIUM SERPL-MCNC: 2.1 MG/DL — SIGNIFICANT CHANGE UP (ref 1.6–2.6)
MCHC RBC-ENTMCNC: 29 PG — SIGNIFICANT CHANGE UP (ref 27–34)
MCHC RBC-ENTMCNC: 31 GM/DL — LOW (ref 32–36)
MCV RBC AUTO: 93.4 FL — SIGNIFICANT CHANGE UP (ref 80–100)
NRBC # BLD: 0 /100 WBCS — SIGNIFICANT CHANGE UP (ref 0–0)
NRBC BLD-RTO: 0 /100 WBCS — SIGNIFICANT CHANGE UP (ref 0–0)
PCO2 BLDA: 40 MMHG — SIGNIFICANT CHANGE UP (ref 35–48)
PH BLDA: 7.38 — SIGNIFICANT CHANGE UP (ref 7.35–7.45)
PHOSPHATE SERPL-MCNC: 4.7 MG/DL — HIGH (ref 2.5–4.5)
PLATELET # BLD AUTO: 196 K/UL — SIGNIFICANT CHANGE UP (ref 150–400)
PO2 BLDA: 103 MMHG — SIGNIFICANT CHANGE UP (ref 83–108)
POTASSIUM SERPL-MCNC: 4.3 MMOL/L — SIGNIFICANT CHANGE UP (ref 3.5–5.3)
POTASSIUM SERPL-SCNC: 4.3 MMOL/L — SIGNIFICANT CHANGE UP (ref 3.5–5.3)
RBC # BLD: 3.66 M/UL — LOW (ref 4.2–5.8)
RBC # FLD: 12.6 % — SIGNIFICANT CHANGE UP (ref 10.3–14.5)
SAO2 % BLDA: 98.2 % — HIGH (ref 94–98)
SODIUM SERPL-SCNC: 143 MMOL/L — SIGNIFICANT CHANGE UP (ref 135–145)
WBC # BLD: 8.43 K/UL — SIGNIFICANT CHANGE UP (ref 3.8–10.5)
WBC # FLD AUTO: 8.43 K/UL — SIGNIFICANT CHANGE UP (ref 3.8–10.5)

## 2024-10-09 PROCEDURE — 99291 CRITICAL CARE FIRST HOUR: CPT

## 2024-10-09 PROCEDURE — 71045 X-RAY EXAM CHEST 1 VIEW: CPT | Mod: 26

## 2024-10-09 PROCEDURE — 74018 RADEX ABDOMEN 1 VIEW: CPT | Mod: 26

## 2024-10-09 RX ORDER — ACETAMINOPHEN 500 MG/5ML
1000 LIQUID (ML) ORAL ONCE
Refills: 0 | Status: COMPLETED | OUTPATIENT
Start: 2024-10-09 | End: 2024-10-09

## 2024-10-09 RX ORDER — SODIUM CHLORIDE 9 G/1000ML
1000 INJECTION, SOLUTION INTRAVENOUS
Refills: 0 | Status: DISCONTINUED | OUTPATIENT
Start: 2024-10-09 | End: 2024-10-12

## 2024-10-09 RX ORDER — ACETYLCYSTEINE 200 MG/ML
4 INHALANT RESPIRATORY (INHALATION) EVERY 8 HOURS
Refills: 0 | Status: DISCONTINUED | OUTPATIENT
Start: 2024-10-09 | End: 2024-10-10

## 2024-10-09 RX ORDER — IPRATROPIUM BROMIDE AND ALBUTEROL SULFATE .5; 2.5 MG/3ML; MG/3ML
3 SOLUTION RESPIRATORY (INHALATION) EVERY 8 HOURS
Refills: 0 | Status: DISCONTINUED | OUTPATIENT
Start: 2024-10-09 | End: 2024-10-10

## 2024-10-09 RX ADMIN — Medication 100 MILLIGRAM(S): at 21:33

## 2024-10-09 RX ADMIN — IPRATROPIUM BROMIDE AND ALBUTEROL SULFATE 3 MILLILITER(S): .5; 2.5 SOLUTION RESPIRATORY (INHALATION) at 00:07

## 2024-10-09 RX ADMIN — Medication 25 MICROGRAM(S): at 06:18

## 2024-10-09 RX ADMIN — Medication 1000 MILLIGRAM(S): at 07:30

## 2024-10-09 RX ADMIN — ACETYLCYSTEINE 4 MILLILITER(S): 200 INHALANT RESPIRATORY (INHALATION) at 13:14

## 2024-10-09 RX ADMIN — Medication 15 MILLILITER(S): at 06:17

## 2024-10-09 RX ADMIN — DEXMEDETOMIDINE HYDROCHLORIDE IN SODIUM CHLORIDE 4 MICROGRAM(S)/KG/HR: 4 INJECTION INTRAVENOUS at 05:16

## 2024-10-09 RX ADMIN — Medication 50 MILLIGRAM(S): at 06:17

## 2024-10-09 RX ADMIN — Medication 25 MICROGRAM(S): at 07:00

## 2024-10-09 RX ADMIN — SODIUM CHLORIDE 100 MILLILITER(S): 9 INJECTION, SOLUTION INTRAVENOUS at 11:35

## 2024-10-09 RX ADMIN — Medication 80 MILLIGRAM(S): at 23:17

## 2024-10-09 RX ADMIN — HEPARIN SODIUM 5000 UNIT(S): 1000 INJECTION INTRAVENOUS; SUBCUTANEOUS at 06:18

## 2024-10-09 RX ADMIN — HEPARIN SODIUM 5000 UNIT(S): 1000 INJECTION INTRAVENOUS; SUBCUTANEOUS at 14:32

## 2024-10-09 RX ADMIN — Medication 25 MICROGRAM(S): at 02:44

## 2024-10-09 RX ADMIN — Medication 667 MILLIGRAM(S): at 17:09

## 2024-10-09 RX ADMIN — Medication 50 MILLIGRAM(S): at 14:31

## 2024-10-09 RX ADMIN — AMLODIPINE BESYLATE 10 MILLIGRAM(S): 10 TABLET ORAL at 06:17

## 2024-10-09 RX ADMIN — LABETALOL HYDROCHLORIDE 300 MILLIGRAM(S): 200 TABLET, FILM COATED ORAL at 21:33

## 2024-10-09 RX ADMIN — Medication 400 MILLIGRAM(S): at 06:53

## 2024-10-09 RX ADMIN — Medication 15 MILLILITER(S): at 17:10

## 2024-10-09 RX ADMIN — Medication 1 APPLICATION(S): at 06:20

## 2024-10-09 RX ADMIN — IPRATROPIUM BROMIDE AND ALBUTEROL SULFATE 3 MILLILITER(S): .5; 2.5 SOLUTION RESPIRATORY (INHALATION) at 13:14

## 2024-10-09 RX ADMIN — IPRATROPIUM BROMIDE AND ALBUTEROL SULFATE 3 MILLILITER(S): .5; 2.5 SOLUTION RESPIRATORY (INHALATION) at 21:24

## 2024-10-09 RX ADMIN — ACETYLCYSTEINE 4 MILLILITER(S): 200 INHALANT RESPIRATORY (INHALATION) at 06:05

## 2024-10-09 RX ADMIN — TAMSULOSIN HYDROCHLORIDE 0.4 MILLIGRAM(S): 0.4 CAPSULE ORAL at 21:33

## 2024-10-09 RX ADMIN — SODIUM CHLORIDE 100 MILLILITER(S): 9 INJECTION, SOLUTION INTRAVENOUS at 17:11

## 2024-10-09 RX ADMIN — Medication 80 MILLIGRAM(S): at 11:22

## 2024-10-09 RX ADMIN — IPRATROPIUM BROMIDE AND ALBUTEROL SULFATE 3 MILLILITER(S): .5; 2.5 SOLUTION RESPIRATORY (INHALATION) at 06:05

## 2024-10-09 RX ADMIN — Medication 1 APPLICATION(S): at 17:10

## 2024-10-09 RX ADMIN — Medication 40 MILLIGRAM(S): at 11:22

## 2024-10-09 RX ADMIN — Medication 80 MILLIGRAM(S): at 17:10

## 2024-10-09 RX ADMIN — Medication 650 MILLIGRAM(S): at 02:00

## 2024-10-09 RX ADMIN — Medication 50 MILLIGRAM(S): at 21:32

## 2024-10-09 RX ADMIN — LABETALOL HYDROCHLORIDE 300 MILLIGRAM(S): 200 TABLET, FILM COATED ORAL at 14:31

## 2024-10-09 RX ADMIN — HEPARIN SODIUM 5000 UNIT(S): 1000 INJECTION INTRAVENOUS; SUBCUTANEOUS at 21:33

## 2024-10-09 RX ADMIN — Medication 100 MILLIGRAM(S): at 11:22

## 2024-10-09 RX ADMIN — ACETYLCYSTEINE 4 MILLILITER(S): 200 INHALANT RESPIRATORY (INHALATION) at 00:07

## 2024-10-09 RX ADMIN — Medication 1 APPLICATION(S): at 06:21

## 2024-10-09 RX ADMIN — Medication 80 MILLIGRAM(S): at 06:17

## 2024-10-09 RX ADMIN — Medication 650 MILLIGRAM(S): at 01:35

## 2024-10-09 RX ADMIN — ACETYLCYSTEINE 4 MILLILITER(S): 200 INHALANT RESPIRATORY (INHALATION) at 21:24

## 2024-10-09 RX ADMIN — LABETALOL HYDROCHLORIDE 300 MILLIGRAM(S): 200 TABLET, FILM COATED ORAL at 06:17

## 2024-10-09 RX ADMIN — Medication 25 MICROGRAM(S): at 03:00

## 2024-10-09 NOTE — PROGRESS NOTE ADULT - SUBJECTIVE AND OBJECTIVE BOX
S/Overnight events:  Unable to obtain subjective data given pt's neurologic status      Hospital Course:   9/30: transferred from [facility]. A line placed. Versed dc'd. Cy Rader at bedside, states pt has family in [place], cannot confirm medications or PMH other than stroke and MI. 250cc bolus 3% given. LR switched to NS. hydralazine 25q8 started, 3% started, switched propofol to precedex   10/1: stability CTH done. Added labetalol, started TF. Palliative consulted. ethics consulted to determine surrogate. febrile 103, pan cx sent  10/2: BD 2, GEORGE overnight. TF resumed. Desatt'd to 80s, FiO2 inc. to 50. Fentanyl given, ABG, CXR ordered. Maxxed on precedex, started on propofol for DARIEN -4 - -5. Precedex dc'd. Duonebs, mucomyst, hypertonic added. 3% dc'd. Cardene dc'd. Start vanc/CTX. Increased labetalol 200q8. MRSA negative, dc'd vanc. ETT pulled back 2cm x 2, good positioning after confirmatory chest xray. Ethics attempting to establish HCP with family. Na 159, starting FW 250q6 for range 150-155.   10/3: BD3, GEORGE o/n, neuro stable. Na elevating, FW increased to 300q6. Dc'd bowel reg for diarrhea. vEEG started. SQH 5000q8 tonight.   10/4: BD 4, albumn bolus, incr. LR to 80 2/2 incr. in Cr, LR to 100cc/hr for uptrending Cr. Started 7% hypersal for 48hrs and SL atropine for inline/oral thick secretions. Dc'd CTX and started ancef for MSSA in the sputum. Nephrology consulted for CKD, f/u recs. SBP 170s, given hydralazine 10mg IVP.   10/5: BD5, o/n 10mg IVP hydralazine given for SBP 170s and started on hydralazine 25q8 via OGT. 10mg IV push labetalol for SBP > 160s. RT placed for diarrhea.   10/6: BD6, o/n FW increased to 350q4 per nephrology recs. IV tylenol for temp 100.6, SBp 160s presumed uncomfortable.   10/7: BD7, overnight pancultured for temp 101.8F.   10/8: BD8. GEORGE. Cr bumped. decreased LR to 75cc/hr. Adding simethicone ATC. incr hydralazine 50mgTID. Incr labetalol 300mgTID. Na 145, decreased FWF to 250q6. Start precedex. FENa consistent with intrinsic kidney injury. Pend repeat renal US. Retaining up to 1.3L, bladder scans q6, straight cath PRN  10/9: BD 9. GEORGE overnight. Neuro stable.    Vital Signs Last 24 Hrs  T(C): 38 (08 Oct 2024 21:51), Max: 38 (08 Oct 2024 21:51)  T(F): 100.4 (08 Oct 2024 21:51), Max: 100.4 (08 Oct 2024 21:51)  HR: 90 (08 Oct 2024 20:37) (50 - 90)  BP: 138/84 (08 Oct 2024 20:00) (121/89 - 167/98)  BP(mean): 103 (08 Oct 2024 20:00) (101 - 128)  RR: 16 (08 Oct 2024 20:37) (12 - 20)  SpO2: 96% (08 Oct 2024 20:37) (95% - 100%)    Parameters below as of 08 Oct 2024 21:00  Patient On (Oxygen Delivery Method): ventilator, CPAP        I&O's Detail    07 Oct 2024 07:01  -  08 Oct 2024 07:00  --------------------------------------------------------  IN:    Lactated Ringers: 1100 mL    Lactated Ringers: 525 mL    Nepro with Carb Steady: 1080 mL  Total IN: 2705 mL    OUT:    Rectal Tube (mL): 100 mL    Voided (mL): 1690 mL  Total OUT: 1790 mL    Total NET: 915 mL      08 Oct 2024 07:01  -  09 Oct 2024 00:19  --------------------------------------------------------  IN:    Dexmedetomidine: 32 mL    Free Water: 530 mL    IV PiggyBack: 50 mL    Lactated Ringers: 975 mL    Nepro with Carb Steady: 780 mL  Total IN: 2367 mL    OUT:    Intermittent Catheterization - Urethral (mL): 1300 mL    Rectal Tube (mL): 400 mL    Voided (mL): 1595 mL  Total OUT: 3295 mL    Total NET: -928 mL        I&O's Summary    07 Oct 2024 07:01  -  08 Oct 2024 07:00  --------------------------------------------------------  IN: 2705 mL / OUT: 1790 mL / NET: 915 mL    08 Oct 2024 07:01  -  09 Oct 2024 00:19  --------------------------------------------------------  IN: 2367 mL / OUT: 3295 mL / NET: -928 mL        PHYSICAL EXAM:  Constitutional: NAD, intubated  Respiratory: breathing non-labored, symmetrical chest wall movement, ETT  Cardiovascuar: RRR, no murmurs  Gastrointestinal: abdomen soft, non tender, OGT, RT  Neurological:  AO0 not follwoing commands  Pupils equal 2mm and brisk  + L corneal, negative R corneal  + cough/gag reflexes  Extends to noxious in bilateral upper extremities  triple flexes to noxious bilateral lower extremities    DEVICE/DRAIN DRESSING:    TUBES/LINES:  [] CVC  [x] A-line  [] Lumbar Drain  [] Ventriculostomy  [] Other    DIET:  [] NPO  [] Mechanical  [x] Tube feeds    LABS:                        10.3   9.50  )-----------( 199      ( 08 Oct 2024 04:42 )             33.5     10-08    145  |  110[H]  |  58[H]  ----------------------------<  126[H]  3.9   |  24  |  6.18[H]    Ca    8.4      08 Oct 2024 04:42  Phos  5.9     10-08  Mg     2.2     10-08        Urinalysis Basic - ( 08 Oct 2024 04:42 )    Color: x / Appearance: x / SG: x / pH: x  Gluc: 126 mg/dL / Ketone: x  / Bili: x / Urobili: x   Blood: x / Protein: x / Nitrite: x   Leuk Esterase: x / RBC: x / WBC x   Sq Epi: x / Non Sq Epi: x / Bacteria: x          CAPILLARY BLOOD GLUCOSE      POCT Blood Glucose.: 104 mg/dL (08 Oct 2024 17:37)  POCT Blood Glucose.: 109 mg/dL (08 Oct 2024 11:46)      Drug Levels: [] N/A    CSF Analysis: [] N/A      Allergies    Allergy Status Unknown    Intolerances      MEDICATIONS:  Antibiotics:  ceFAZolin   IVPB 1000 milliGRAM(s) IV Intermittent every 12 hours    Neuro:  acetaminophen   Oral Liquid .. 650 milliGRAM(s) Oral every 6 hours PRN  dexMEDEtomidine Infusion 0.2 MICROgram(s)/kG/Hr IV Continuous <Continuous>  fentaNYL    Injectable 25 MICROGram(s) IV Push every 2 hours PRN    Anticoagulation:  heparin   Injectable 5000 Unit(s) SubCutaneous every 8 hours    OTHER:  acetylcysteine 10%  Inhalation 4 milliLiter(s) Inhalation every 6 hours  albuterol/ipratropium for Nebulization 3 milliLiter(s) Nebulizer every 6 hours  amLODIPine   Tablet 10 milliGRAM(s) Oral daily  chlorhexidine 0.12% Liquid 15 milliLiter(s) Oral Mucosa every 12 hours  chlorhexidine 2% Cloths 1 Application(s) Topical <User Schedule>  hydrALAZINE 50 milliGRAM(s) Oral every 8 hours  labetalol 300 milliGRAM(s) Enteral Tube every 8 hours  pantoprazole  Injectable 40 milliGRAM(s) IV Push daily  petrolatum Ophthalmic Ointment 1 Application(s) Both EYES two times a day  simethicone 80 milliGRAM(s) Chew every 6 hours  tamsulosin 0.4 milliGRAM(s) Oral at bedtime    IVF:  calcium acetate 667 milliGRAM(s) Oral three times a day with meals  lactated ringers. 1000 milliLiter(s) IV Continuous <Continuous>    CULTURES:  Culture Results:   Commensal iram consistent with body site (10-07 @ 04:17)  Culture Results:   No growth at 24 hours (10-07 @ 03:30)    RADIOLOGY & ADDITIONAL TESTS:      ASSESSMENT:  45 y/o PMH ?stroke/MI present to [facility] after collapsing at work. Decorticate posturing, vomiting, intubated for airway protection. 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 Transferred to [facility] for further management    Neuro:  - neuro/vitals q1  - pain control: fentanyl 25q2 prn, tylenol  - Sedation: Precedex gtt for RASS -2 to -3   - CT 9/30: enlarged pontine hemorrhage, CTH 10/3 done read stable   - vEEG (10/3-4)- negative  - stroke core measures, stroke neuro following  - palliative and ethics following, attempting to establish GOC  - MRI brain w/ w/o contrast pending    CV:  - SBP<160  - HTN: norvasc 10mg, labetalol 300q8, hydralazine 50q8  - echo (9/30) EF 75%    Resp:  - Intubated, FVS, CPAP trials as tolerated  - duonebs/mucomyst q6    GI:  - TF via OGT, nepro w/ banatrol   - bowel reg held for diarrhea, RT (10/5- )   - protonix while intubated  - standing simethicone for abdominal distension    Renal:  - LR@75cc/hr for uptending BUN/Cr  - nephro following, recs appreciated: phoslo TID  - hypernatremia:  q6hrs  - bladder scan q6hrs, sc PRN, Flomax 0.4mg at bedtime started for urinary retention   - trend BUN/Cr  - renal US 10/1: echogenicity c/w chronic med renal dz, repeat done 10/8: pending read    Endo:  - a1c 5.4    Heme:  - DVT ppx: SCDs, SQH 5000q8    ID:  -  febrile, last pan cx 10/7  - MRSA swab negative, sputum MSSA + , s/p 1 dose vanc (10/2), CTX (10/2 - 10/4), Ancef (10/4- )     Dispo: NSICU, full code, pending GO    D/w Dr. D'Amico, Dr. Reveles S/Overnight events:  Unable to obtain subjective data given pt's neurologic status      Hospital Course:   9/30: transferred from [facility]. A line placed. Versed dc'd. Cy Rader at bedside, states pt has family in [place], cannot confirm medications or PMH other than stroke and MI. 250cc bolus 3% given. LR switched to NS. hydralazine 25q8 started, 3% started, switched propofol to precedex   10/1: stability CTH done. Added labetalol, started TF. Palliative consulted. ethics consulted to determine surrogate. febrile 103, pan cx sent  10/2: BD 2, GEORGE overnight. TF resumed. Desatt'd to 80s, FiO2 inc. to 50. Fentanyl given, ABG, CXR ordered. Maxxed on precedex, started on propofol for DARIEN -4 - -5. Precedex dc'd. Duonebs, mucomyst, hypertonic added. 3% dc'd. Cardene dc'd. Start vanc/CTX. Increased labetalol 200q8. MRSA negative, dc'd vanc. ETT pulled back 2cm x 2, good positioning after confirmatory chest xray. Ethics attempting to establish HCP with family. Na 159, starting FW 250q6 for range 150-155.   10/3: BD3, GEORGE o/n, neuro stable. Na elevating, FW increased to 300q6. Dc'd bowel reg for diarrhea. vEEG started. SQH 5000q8 tonight.   10/4: BD 4, albumn bolus, incr. LR to 80 2/2 incr. in Cr, LR to 100cc/hr for uptrending Cr. Started 7% hypersal for 48hrs and SL atropine for inline/oral thick secretions. Dc'd CTX and started ancef for MSSA in the sputum. Nephrology consulted for CKD, f/u recs. SBP 170s, given hydralazine 10mg IVP.   10/5: BD5, o/n 10mg IVP hydralazine given for SBP 170s and started on hydralazine 25q8 via OGT. 10mg IV push labetalol for SBP > 160s. RT placed for diarrhea.   10/6: BD6, o/n FW increased to 350q4 per nephrology recs. IV tylenol for temp 100.6, SBp 160s presumed uncomfortable.   10/7: BD7, overnight pancultured for temp 101.8F.   10/8: BD8. GEORGE. Cr bumped. decreased LR to 75cc/hr. Adding simethicone ATC. incr hydralazine 50mgTID. Incr labetalol 300mgTID. Na 145, decreased FWF to 250q6. Start precedex. FENa consistent with intrinsic kidney injury. Pend repeat renal US. Retaining up to 1.3L, bladder scans q6, straight cath PRN  10/9: BD 9. GEORGE overnight. Neuro stable.    Vital Signs Last 24 Hrs  T(C): 38 (08 Oct 2024 21:51), Max: 38 (08 Oct 2024 21:51)  T(F): 100.4 (08 Oct 2024 21:51), Max: 100.4 (08 Oct 2024 21:51)  HR: 90 (08 Oct 2024 20:37) (50 - 90)  BP: 138/84 (08 Oct 2024 20:00) (121/89 - 167/98)  BP(mean): 103 (08 Oct 2024 20:00) (101 - 128)  RR: 16 (08 Oct 2024 20:37) (12 - 20)  SpO2: 96% (08 Oct 2024 20:37) (95% - 100%)    Parameters below as of 08 Oct 2024 21:00  Patient On (Oxygen Delivery Method): ventilator, CPAP        I&O's Detail    07 Oct 2024 07:01  -  08 Oct 2024 07:00  --------------------------------------------------------  IN:    Lactated Ringers: 1100 mL    Lactated Ringers: 525 mL    Nepro with Carb Steady: 1080 mL  Total IN: 2705 mL    OUT:    Rectal Tube (mL): 100 mL    Voided (mL): 1690 mL  Total OUT: 1790 mL    Total NET: 915 mL      08 Oct 2024 07:01  -  09 Oct 2024 00:19  --------------------------------------------------------  IN:    Dexmedetomidine: 32 mL    Free Water: 530 mL    IV PiggyBack: 50 mL    Lactated Ringers: 975 mL    Nepro with Carb Steady: 780 mL  Total IN: 2367 mL    OUT:    Intermittent Catheterization - Urethral (mL): 1300 mL    Rectal Tube (mL): 400 mL    Voided (mL): 1595 mL  Total OUT: 3295 mL    Total NET: -928 mL        I&O's Summary    07 Oct 2024 07:01  -  08 Oct 2024 07:00  --------------------------------------------------------  IN: 2705 mL / OUT: 1790 mL / NET: 915 mL    08 Oct 2024 07:01  -  09 Oct 2024 00:19  --------------------------------------------------------  IN: 2367 mL / OUT: 3295 mL / NET: -928 mL        PHYSICAL EXAM:  Constitutional: NAD  Respiratory: breathing non-labored, symmetrical chest wall movement, endotracheal tuibe in place  Cardiovascuar: RRR  Gastrointestinal: abdomen soft, non tender, OGT, RT  Neurological:  AO0 not follwoing commands  Pupils equal 2mm and brisk  + L corneal, negative R corneal  + cough/gag reflexes  Extends to noxious in bilateral upper extremities  triple flexes to noxious bilateral lower extremities  Wounds/incisions: n/a    DEVICE/DRAIN DRESSING:    TUBES/LINES:  [] CVC  [x] A-line  [] Lumbar Drain  [] Ventriculostomy  [] Other    DIET:  [] NPO  [] Mechanical  [x] Tube feeds    LABS:                        10.3   9.50  )-----------( 199      ( 08 Oct 2024 04:42 )             33.5     10-08    145  |  110[H]  |  58[H]  ----------------------------<  126[H]  3.9   |  24  |  6.18[H]    Ca    8.4      08 Oct 2024 04:42  Phos  5.9     10-08  Mg     2.2     10-08        Urinalysis Basic - ( 08 Oct 2024 04:42 )    Color: x / Appearance: x / SG: x / pH: x  Gluc: 126 mg/dL / Ketone: x  / Bili: x / Urobili: x   Blood: x / Protein: x / Nitrite: x   Leuk Esterase: x / RBC: x / WBC x   Sq Epi: x / Non Sq Epi: x / Bacteria: x          CAPILLARY BLOOD GLUCOSE      POCT Blood Glucose.: 104 mg/dL (08 Oct 2024 17:37)  POCT Blood Glucose.: 109 mg/dL (08 Oct 2024 11:46)      Drug Levels: [] N/A    CSF Analysis: [] N/A      Allergies    Allergy Status Unknown    Intolerances      MEDICATIONS:  Antibiotics:  ceFAZolin   IVPB 1000 milliGRAM(s) IV Intermittent every 12 hours    Neuro:  acetaminophen   Oral Liquid .. 650 milliGRAM(s) Oral every 6 hours PRN  dexMEDEtomidine Infusion 0.2 MICROgram(s)/kG/Hr IV Continuous <Continuous>  fentaNYL    Injectable 25 MICROGram(s) IV Push every 2 hours PRN    Anticoagulation:  heparin   Injectable 5000 Unit(s) SubCutaneous every 8 hours    OTHER:  acetylcysteine 10%  Inhalation 4 milliLiter(s) Inhalation every 6 hours  albuterol/ipratropium for Nebulization 3 milliLiter(s) Nebulizer every 6 hours  amLODIPine   Tablet 10 milliGRAM(s) Oral daily  chlorhexidine 0.12% Liquid 15 milliLiter(s) Oral Mucosa every 12 hours  chlorhexidine 2% Cloths 1 Application(s) Topical <User Schedule>  hydrALAZINE 50 milliGRAM(s) Oral every 8 hours  labetalol 300 milliGRAM(s) Enteral Tube every 8 hours  pantoprazole  Injectable 40 milliGRAM(s) IV Push daily  petrolatum Ophthalmic Ointment 1 Application(s) Both EYES two times a day  simethicone 80 milliGRAM(s) Chew every 6 hours  tamsulosin 0.4 milliGRAM(s) Oral at bedtime    IVF:  calcium acetate 667 milliGRAM(s) Oral three times a day with meals  lactated ringers. 1000 milliLiter(s) IV Continuous <Continuous>    CULTURES:  Culture Results:   Commensal iram consistent with body site (10-07 @ 04:17)  Culture Results:   No growth at 24 hours (10-07 @ 03:30)    RADIOLOGY & ADDITIONAL TESTS:      ASSESSMENT:  45 y/o PMH ?stroke/MI present to [facility] after collapsing at work. Decorticate posturing, vomiting, intubated for airway protection. 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 Transferred to [facility] for further management    Neuro:  - neuro/vitals q1  - pain control: fentanyl 25q2 prn, tylenol  - Sedation: Precedex gtt for RASS -2 to -3   - CT 9/30: enlarged pontine hemorrhage, CTH 10/3 done read stable   - vEEG (10/3-4)- negative  - stroke core measures, stroke neuro following  - palliative and ethics following, attempting to establish GOC  - MRI brain w/ w/o contrast pending    CV:  - SBP<160  - HTN: norvasc 10mg, labetalol 300q8, hydralazine 50q8  - echo (9/30) EF 75%    Resp:  - Intubated, FVS, CPAP trials as tolerated  - duonebs/mucomyst q6    GI:  - TF via OGT, nepro w/ banatrol   - bowel reg held for diarrhea, RT (10/5- )   - protonix while intubated  - standing simethicone for abdominal distension    Renal:  - LR@75cc/hr for uptending BUN/Cr  - nephro following, recs appreciated: phoslo TID  - hypernatremia:  q6hrs  - bladder scan q6hrs, sc PRN, Flomax 0.4mg at bedtime started for urinary retention   - trend BUN/Cr  - renal US 10/1: echogenicity c/w chronic med renal dz, repeat done 10/8: pending read    Endo:  - a1c 5.4    Heme:  - DVT ppx: SCDs, SQH 5000q8    ID:  -  febrile, last pan cx 10/7  - MRSA swab negative, sputum MSSA + , s/p 1 dose vanc (10/2), CTX (10/2 - 10/4), Ancef (10/4- )     Dispo: NSICU, full code, pending [facility]    D/w Dr. D'Amico, Dr. Reveles

## 2024-10-09 NOTE — PROGRESS NOTE ADULT - ASSESSMENT
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 CPAP as tolerated  ABG, CXR  VAP bundle  ABG    CV:  SBP goal 100-160  labetalol 200mg tid  amlodipine 10  [place]      RENAL:   ANIKA though suspect CKD  Fluids: LR at 80cc/hr  Vuong catheter in place; monitor Is/Os  Na goal 140-150  BMPq12  Nephrology following  [id number]      GI:  Diet: TF  GI prophylaxis [] not indicated [x] PPI [] other:  Bowel regimen [] colace [x] senna [] other: miralax  Monitor LFTs    ENDO:   Goal euglycemia (-180)      HEME/ONC:  VTE prophylaxis: [x] SCDs [x] chemoprophylaxis       ID:   Mild leukocytosis  f/u pan culture  Ancef (10/2 - ) x7d  MRSA swab neg  f/u pan culture

## 2024-10-09 NOTE — PROGRESS NOTE ADULT - SUBJECTIVE AND OBJECTIVE BOX
INTERVAL HISTORY: HPI:  Unknown age male, unknown past medical history (reported stroke and MI by coworkers) presented to [facility] [place] with AMS, Pt was working at [employer] when he was found down by coworkers. EMS called and pt brought to [facility] [place] ED. Intubated, sedated, started on cardene for SBPs in 200s. CT head showed brain stem bleed. Transferred to NSICU for further management.  (30 Sep 2024 12:55)    Drug Dosing Weight  Height (cm): 172.7 (30 Sep 2024 08:39)  Weight (kg): 80 (30 Sep 2024 09:04)  BMI (kg/m2): 26.8 (30 Sep 2024 09:04)  BSA (m2): 1.94 (30 Sep 2024 09:04)    PAST MEDICAL & SURGICAL HISTORY:  Acute myocardial infarction  CVA (cerebral vascular accident)    REVIEW OF SYSTEMS: [ ] Unable to Assess due to neurologic exam   [ ] All ROS addressed below are non-contributory, except:  Neuro: [ ] Headache [ ] Back pain [ ] Numbness [ ] Weakness [ ] Ataxia [ ] Dizziness [ ] Aphasia [ ] Dysarthria [ ] Visual disturbance  Resp: [ ] Shortness of breath/dyspnea, [ ] Orthopnea [ ] Cough  CV: [ ] Chest pain [ ] Palpitation [ ] Lightheadedness [ ] Syncope  Renal: [ ] Thirst [ ] Edema  GI: [ ] Nausea [ ] Emesis [ ] Abdominal pain [ ] Constipation [ ] Diarrhea  Hem: [ ] Hematemesis [ ] bright red blood per rectum  ID: [ ] Fever [ ] Chills [ ] Dysuria  ENT: [ ] Rhinorrhea    PHYSICAL EXAM:    General: intubated   Neurological: dysconjugate gaze, ? following with eyes(at times looks down to command),  absent R corneal reflex, L is still present, does not doll to movement of head, strong cough, able to maintain spontaneous breaths on CPAP, RUE extension, LUE minimal movement to nox, b/l LE WD to nox,  Pulmonary: Clear to Auscultation, No Rales, No Rhonchi, No Wheezes   Cardiovascular: S1, S2, Regular Rate and Rhythm   Gastrointestinal: Soft, Nontender, Nondistended   Extremities: No LE edema       ICU Vital Signs Last 24 Hrs  T(C): 37.7 (09 Oct 2024 07:00), Max: 38.8 (09 Oct 2024 05:42)  T(F): 99.8 (09 Oct 2024 07:00), Max: 101.8 (09 Oct 2024 05:42)  HR: 84 (09 Oct 2024 07:00) (70 - 103)  BP: 143/88 (09 Oct 2024 02:00) (133/82 - 167/98)  BP(mean): 106 (09 Oct 2024 02:00) (101 - 124)  ABP: 137/80 (09 Oct 2024 07:00) (137/80 - 170/111)  ABP(mean): 98 (09 Oct 2024 07:00) (98 - 139)  RR: 16 (09 Oct 2024 07:00) (12 - 20)  SpO2: 97% (09 Oct 2024 07:00) (94% - 100%)      10-08-24 @ 07:01  -  10-09-24 @ 07:00  --------------------------------------------------------  IN: 4396 mL / OUT: 3920 mL / NET: 476 mL        Mode: AC/ CMV (Assist Control/ Continuous Mandatory Ventilation), RR (machine): 12, TV (machine): 430, FiO2: 50, PEEP: 5, MAP: 7, PIP: 16    acetaminophen   Oral Liquid .. 650 milliGRAM(s) Oral every 6 hours PRN  acetylcysteine 10%  Inhalation 4 milliLiter(s) Inhalation every 6 hours  albuterol/ipratropium for Nebulization 3 milliLiter(s) Nebulizer every 6 hours  amLODIPine   Tablet 10 milliGRAM(s) Oral daily  calcium acetate 667 milliGRAM(s) Oral three times a day with meals  ceFAZolin   IVPB 1000 milliGRAM(s) IV Intermittent every 12 hours  chlorhexidine 0.12% Liquid 15 milliLiter(s) Oral Mucosa every 12 hours  chlorhexidine 2% Cloths 1 Application(s) Topical <User Schedule>  dexMEDEtomidine Infusion 0.2 MICROgram(s)/kG/Hr (4 mL/Hr) IV Continuous <Continuous>  fentaNYL    Injectable 25 MICROGram(s) IV Push every 2 hours PRN  heparin   Injectable 5000 Unit(s) SubCutaneous every 8 hours  hydrALAZINE 50 milliGRAM(s) Oral every 8 hours  labetalol 300 milliGRAM(s) Enteral Tube every 8 hours  lactated ringers. 1000 milliLiter(s) (75 mL/Hr) IV Continuous <Continuous>  pantoprazole  Injectable 40 milliGRAM(s) IV Push daily  petrolatum Ophthalmic Ointment 1 Application(s) Both EYES two times a day  simethicone 80 milliGRAM(s) Chew every 6 hours  tamsulosin 0.4 milliGRAM(s) Oral at bedtime      LABS:  Na: 143 (10-09 @ 05:30), 145 (10-08 @ 04:42), 149 (10-07 @ 04:39)  K: 4.3 (10-09 @ 05:30), 3.9 (10-08 @ 04:42), 4.1 (10-07 @ 04:39)  Cl: 111 (10-09 @ 05:30), 110 (10-08 @ 04:42), 115 (10-07 @ 04:39)  CO2: 20 (10-09 @ 05:30), 24 (10-08 @ 04:42), 23 (10-07 @ 04:39)  BUN: 59 (10-09 @ 05:30), 58 (10-08 @ 04:42), 52 (10-07 @ 04:39)  Cr: 5.67 (10-09 @ 05:30), 6.18 (10-08 @ 04:42), 6.03 (10-07 @ 04:39)  Glu: 110(10-09 @ 05:30), 126(10-08 @ 04:42), 119(10-07 @ 04:39)    Hgb: 10.6 (10-09 @ 05:30), 10.3 (10-08 @ 04:42), 11.1 (10-07 @ 04:39)  Hct: 34.2 (10-09 @ 05:30), 33.5 (10-08 @ 04:42), 36.8 (10-07 @ 04:39)  WBC: 8.43 (10-09 @ 05:30), 9.50 (10-08 @ 04:42), 8.75 (10-07 @ 04:39)  Plt: 196 (10-09 @ 05:30), 199 (10-08 @ 04:42), 200 (10-07 @ 04:39)            ABG - ( 08 Oct 2024 15:15 )  pH, Arterial: 7.43  pH, Blood: x     /  pCO2: 31    /  pO2: 105   / HCO3: 21    / Base Excess: -2.7  /  SaO2: 98.6

## 2024-10-09 NOTE — PROGRESS NOTE ADULT - SUBJECTIVE AND OBJECTIVE BOX
NEUROCRITICAL CARE EVENING NOTE    DAY EVENTS:    - ongoing goc, exam unchanged      VITALS/IMAGING/DATA  - Reviewed    ALLERGIES:   - Reviewed      MEDICATIONS:  - Reviewed    Physical Exam:  Unchanged from day time, please refer to note.

## 2024-10-09 NOTE — CHART NOTE - NSCHARTNOTEFT_GEN_A_CORE
Admitting Diagnosis:   Patient is a 44y old  Male who presents with a chief complaint of brain stem bleed (09 Oct 2024 07:57)  PAST MEDICAL & SURGICAL HISTORY:  Acute myocardial infarction  CVA (cerebral vascular accident)    Current Nutrition Order: NPO with tube feeding via orogastric tube: Nepro starting at 20mL/hour to GOAL rate of 60mL/hour x 24 hours + 1 Banatrol/day, providing 1440mL volume from tube feeding, ~2549 calories, ~117g protein, ~1047mL free water; this is meeting 122-146% of pt's energy needs, 119-139% of protein needs    PO Intake: Good (%) [   ]  Fair (50-75%) [   ] Poor (<25%) [   ] *NPO with tube feeding*     GI Issues: no noted emesis; loose stool output noted, 100mL rectal tube output overnight per nursing; noted with distention per nursing; nontender, soft abdomen noted per medical team;     Pain: no documented pain by nursing    Skin Integrity: no documented Mookie score by nursing; no lower extremity edema per medical team; no documented pressure ulcers      10-08-24 @ 07:01  -  10-09-24 @ 07:00  --------------------------------------------------------  IN: 4396 mL / OUT: 3920 mL / NET: 476 mL    10-09-24 @ 07:01  -  10-09-24 @ 10:52  --------------------------------------------------------  IN: 262 mL / OUT: 1700 mL / NET: -1438 mL        Labs:   10-09    143  |  111[H]  |  59[H]  ----------------------------<  110[H]  4.3   |  20[L]  |  5.67[H]    Ca    7.9[L]      09 Oct 2024 05:30  Phos  4.7     10-09  Mg     2.1     10-09      CAPILLARY BLOOD GLUCOSE      POCT Blood Glucose.: 104 mg/dL (08 Oct 2024 17:37)  POCT Blood Glucose.: 109 mg/dL (08 Oct 2024 11:46)      Medications:  MEDICATIONS  (STANDING):  acetylcysteine 10%  Inhalation 4 milliLiter(s) Inhalation every 8 hours  albuterol/ipratropium for Nebulization 3 milliLiter(s) Nebulizer every 8 hours  amLODIPine   Tablet 10 milliGRAM(s) Oral daily  calcium acetate 667 milliGRAM(s) Oral three times a day with meals  ceFAZolin   IVPB 1000 milliGRAM(s) IV Intermittent every 12 hours  chlorhexidine 0.12% Liquid 15 milliLiter(s) Oral Mucosa every 12 hours  chlorhexidine 2% Cloths 1 Application(s) Topical <User Schedule>  dexMEDEtomidine Infusion 0.2 MICROgram(s)/kG/Hr (4 mL/Hr) IV Continuous <Continuous>  heparin   Injectable 5000 Unit(s) SubCutaneous every 8 hours  hydrALAZINE 50 milliGRAM(s) Oral every 8 hours  labetalol 300 milliGRAM(s) Enteral Tube every 8 hours  lactated ringers. 1000 milliLiter(s) (100 mL/Hr) IV Continuous <Continuous>  pantoprazole  Injectable 40 milliGRAM(s) IV Push daily  petrolatum Ophthalmic Ointment 1 Application(s) Both EYES two times a day  simethicone 80 milliGRAM(s) Chew every 6 hours  tamsulosin 0.4 milliGRAM(s) Oral at bedtime    MEDICATIONS  (PRN):  acetaminophen   Oral Liquid .. 650 milliGRAM(s) Oral every 6 hours PRN Temp greater or equal to 38C (100.4F), Mild Pain (1 - 3)  fentaNYL    Injectable 25 MICROGram(s) IV Push every 2 hours PRN agitation, severe pain, vent desynchrony    Dosing Anthropometrics:   Height for BMI (FEET)	5 Feet  Height for BMI (INCHES)	8 Inch(s)  Height for BMI (CM)	172.72 Centimeter(s)  Weight for BMI (lbs)	176.4 lb  Weight for BMI (kg)	80 kg  Body Mass Index	              26.8  ideal body weight:               154 pounds, pt is ~115% of ideal body weight    Weight Change: no new weights noted in electronic medical records; recommend that nursing obtain updated weights weekly prn. RD to monitor trends.     Estimated energy needs:  Weight used for calculations	ideal body weight  Estimated Energy Needs Weight (lbs)	154 lb  Estimated Energy Needs Weight (kg)	69.8 kg  Estimated Energy Needs From (christiana/kg)	25  Estimated Energy Needs To (christiana/kg)	30  Estimated Energy Needs Calculated From (christiana/kg)	1745  Estimated Energy Needs Calculated To (christiana/kg)	2094  Weight used for calculations	ideal body weight  Estimated Protein Needs Weight (lbs)	154 lb  Estimated Protein Needs Weight (kg)	69.8 kg  Estimated Protein Needs From (g/kg)	1.2  Estimated Protein Needs To (g/kg)	1.4  Estimated Protein Needs Calculated From (g/kg)	83.76  Estimated Protein Needs Calculated To (g/kg)	97.72  Estimated Fluid Needs Weight (lbs)	154 lb  Estimated Fluid Needs Weight (kg)	69.8 kg  Estimated Fluid Needs From (ml/kg)	25  Estimated Fluid Needs To (ml/kg)	30  Estimated Fluid Needs Calculated From (ml/kg)	1745  Estimated Fluid Needs Calculated To (ml/kg)	2094  Other Calculations	Pt is >100% ideal body weight in the ICU, thus ideal body weight used for all calculations. Needs adjusted for age, clinical course, vented, ICU/critical care status.    Subjective: This is a 44 year old male, unknown past medical history (reported stroke and MI by coworkers) presented to [facility] with AMS, Pt was working at [employer] when he was found down by coworkers. EMS called and pt brought to [facility] ED. Intubated, sedated, started on cardene for SBPs in 200s. CT head showed brain stem bleed. Transferred to NSICU for further management.    Pt assessed and discussed with interdisciplinary team at interdisciplinary rounds. Pt intubated, sedated, on precedex gtt, fentanyl IV push, no pressor support, afebrile, MAPs ranging from , on ventilator/CPAP, SpO2 94-99%. Pt noted with no emesis; loose stool output noted, 100mL rectal tube output overnight per nursing; noted with distention per nursing; nontender, soft abdomen noted per medical team. No documented pain by nursing. No lower extremity edema per medical team; no documented pressure ulcers. Labs reviewed: elevated BUN (59), Creatinine (5.67), serum Glucose (110), low calcium (7.9), elevated Phosphorus (4.7), other electrolytes within normal limits, low eGFR (12), per medical team, concern for CKD/kidney issues, pt's tube feeding formula was changed to Nepro formula, no noted concern for protein. RD discussed updated recommendations with medical team so as to not overfeed the patient and adequately meet his needs. Medical team receptive. RD to remain available. Please see additional information/recommendations below.     Previous Nutrition Diagnosis: Inadequate Oral Intake related to inability to meet nutritional demands via PO as evidenced by NPO with tube feedings    Active [ x ]  Resolved [   ]    If resolved, new PES:     Goal: Pt to consistently meet at least 75% of EEE via tolerated route that is consistent with goals of care during hospital stay    Nutrition Recommendations:  1. NPO  **after discussion with medical team, please see and consider the following: Nepro with CarbSteady via tube feeding via orogastric tube: start at 20mL/hour, increase by 10mL/hour every 4-6 hours, to GOAL of 60mL/hour x 18 hours, providing 1080mL volume from tube feeding, ~1912 calories, ~87g protein, ~785mL free water; this meets ~27 calories/kg ideal body weight, ~1.25g protein/kg ideal body weight, ~23 nonprotein calories, consider ~188mL free water every 4 hours;   *Maintain aspiration precautions at all times; monitor for signs/symptoms of intolerance*  2. Monitor weights (weekly), GI function, skin integrity, chemistry/labs  3. Pain and bowel regimen per medical team  4. Align nutrition with goals of care at all times     Education: deferred at this time; RD to follow up prn as appropriate.     Risk Level: High [ x ] Moderate [   ] Low [   ]

## 2024-10-09 NOTE — CHART NOTE - NSCHARTNOTEFT_GEN_A_CORE
Patient seen in AM with abdominal distention. X-ray significant for non-obstructive gas pattern. Tube feeds were held, OGT was placed to low intermittent wall suction. Of note, patient was also straight catheterized and put out 1L. Abdominal distention improved.     Tube feeds started at 20cc/hr, will continue to monitor.     Patient tolerated CPAP throughout the day. Goals of care conversation pending with son.

## 2024-10-09 NOTE — PROGRESS NOTE ADULT - ASSESSMENT
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    NEURO:  ICH score 3   -neuro check q4  -on going GOC  -PT/OT evaluation if signs of responsiveness   -sedation w/ precedex RASS goal -2 to -3    PULMONARY:  Currently requiring mechanical ventilation for air-way protection; CPAP trials as tolerated  check abg daily and adjust accordingly   would recommend early tracheostomy if within GOC    CARDIOVASCULAR:  monitor on telemetry   vitals q1  sbp goal 100-160  -c/w amlodipine 10mg, labetalol 300mg TID, hydralazine 50mg TID   TTE w/ hypertensive changes     GASTROENTEROLOGY:  would recommend PEG if within GOC  GI ppx : Protonix 40mg qd  loose stools, c/w rectal tube; d/c bowel regimen     RENAL/:  ANIKA on likely CKD ; likely 2/2 contrast induced nephropathy ; downtrending Cr   -c/w phoslo; avoid nephrotoxic medications   -check BMP qd  -strict i/o's ; c/w Vuong will d/c and initiate TOV today   -Na goal 135-145; c/w free water 250cc q6     ENDOCRINE:  A1c- 5.3    HEME/ONC:  DVT ppx: SQH  b/l SCDs    INFECTIOUS:   febrile in AM; 07/10 blood & sputum culture NGTD    Code statues: full code, ongoing GOC w/ palliative  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    NEURO:  ICH score 3   -neuro check q4  -on going GOC  -PT/OT evaluation if signs of responsiveness   -sedation w/ precedex RASS goal -2 to -3    PULMONARY:  Currently requiring mechanical ventilation for air-way protection; CPAP trials as tolerated  check abg daily and adjust accordingly   would recommend early tracheostomy if within GOC    CARDIOVASCULAR:  monitor on telemetry   vitals q1  sbp goal 100-160  -c/w amlodipine 10mg, labetalol 300mg TID, hydralazine 50mg TID   TTE w/ hypertensive changes     GASTROENTEROLOGY:  would recommend PEG if within GOC  GI ppx : Protonix 40mg qd  loose stools, c/w rectal tube; d/c bowel regimen     RENAL/:  ANIKA on likely CKD ; likely 2/2 contrast induced nephropathy ; downtrending Cr   -c/w phoslo; avoid nephrotoxic medications   -check BMP qd  -strict i/o's ; c/w Vuong will d/c and initiate TOV today   -Na goal 135-145; c/w free water 250cc q6     ENDOCRINE:  A1c- 5.3    HEME/ONC:  DVT ppx: SQH  b/l SCDs    INFECTIOUS:   febrile in AM; 07/10 blood & sputum culture NGTD  ancef for MSSA+ sputum; continue for total 10 days    Code statues: full code, ongoing GOC w/ palliative

## 2024-10-10 LAB
ANION GAP SERPL CALC-SCNC: 10 MMOL/L — SIGNIFICANT CHANGE UP (ref 5–17)
BASE EXCESS BLDA CALC-SCNC: -0.2 MMOL/L — SIGNIFICANT CHANGE UP (ref -2–3)
BUN SERPL-MCNC: 54 MG/DL — HIGH (ref 7–23)
CALCIUM SERPL-MCNC: 8.4 MG/DL — SIGNIFICANT CHANGE UP (ref 8.4–10.5)
CHLORIDE SERPL-SCNC: 110 MMOL/L — HIGH (ref 96–108)
CO2 BLDA-SCNC: 25 MMOL/L — HIGH (ref 19–24)
CO2 SERPL-SCNC: 23 MMOL/L — SIGNIFICANT CHANGE UP (ref 22–31)
CREAT SERPL-MCNC: 4.57 MG/DL — HIGH (ref 0.5–1.3)
CYSTATIN C SERPL-MCNC: 2.99 MG/L — HIGH (ref 0.68–1.22)
EGFR: 15 ML/MIN/1.73M2 — LOW
EGFR: 15 ML/MIN/1.73M2 — LOW
GFR/BSA.PRED SERPLBLD CYS-BASED-ARV: 19 ML/MIN/1.73M2 — LOW
GLUCOSE BLDC GLUCOMTR-MCNC: 107 MG/DL — HIGH (ref 70–99)
GLUCOSE BLDC GLUCOMTR-MCNC: 81 MG/DL — SIGNIFICANT CHANGE UP (ref 70–99)
GLUCOSE BLDC GLUCOMTR-MCNC: 86 MG/DL — SIGNIFICANT CHANGE UP (ref 70–99)
GLUCOSE SERPL-MCNC: 96 MG/DL — SIGNIFICANT CHANGE UP (ref 70–99)
HCO3 BLDA-SCNC: 24 MMOL/L — SIGNIFICANT CHANGE UP (ref 21–28)
HCT VFR BLD CALC: 31.6 % — LOW (ref 39–50)
HGB BLD-MCNC: 9.8 G/DL — LOW (ref 13–17)
MAGNESIUM SERPL-MCNC: 1.9 MG/DL — SIGNIFICANT CHANGE UP (ref 1.6–2.6)
MCHC RBC-ENTMCNC: 27.5 PG — SIGNIFICANT CHANGE UP (ref 27–34)
MCHC RBC-ENTMCNC: 31 GM/DL — LOW (ref 32–36)
MCV RBC AUTO: 88.8 FL — SIGNIFICANT CHANGE UP (ref 80–100)
NRBC # BLD: 0 /100 WBCS — SIGNIFICANT CHANGE UP (ref 0–0)
NRBC BLD-RTO: 0 /100 WBCS — SIGNIFICANT CHANGE UP (ref 0–0)
PCO2 BLDA: 37 MMHG — SIGNIFICANT CHANGE UP (ref 35–48)
PH BLDA: 7.42 — SIGNIFICANT CHANGE UP (ref 7.35–7.45)
PHOSPHATE SERPL-MCNC: 5.5 MG/DL — HIGH (ref 2.5–4.5)
PLATELET # BLD AUTO: 190 K/UL — SIGNIFICANT CHANGE UP (ref 150–400)
PO2 BLDA: 89 MMHG — SIGNIFICANT CHANGE UP (ref 83–108)
POTASSIUM SERPL-MCNC: 4.1 MMOL/L — SIGNIFICANT CHANGE UP (ref 3.5–5.3)
POTASSIUM SERPL-SCNC: 4.1 MMOL/L — SIGNIFICANT CHANGE UP (ref 3.5–5.3)
RBC # BLD: 3.56 M/UL — LOW (ref 4.2–5.8)
RBC # FLD: 12.4 % — SIGNIFICANT CHANGE UP (ref 10.3–14.5)
SAO2 % BLDA: 98 % — SIGNIFICANT CHANGE UP (ref 94–98)
SODIUM SERPL-SCNC: 143 MMOL/L — SIGNIFICANT CHANGE UP (ref 135–145)
WBC # BLD: 7.44 K/UL — SIGNIFICANT CHANGE UP (ref 3.8–10.5)
WBC # FLD AUTO: 7.44 K/UL — SIGNIFICANT CHANGE UP (ref 3.8–10.5)

## 2024-10-10 PROCEDURE — 99497 ADVNCD CARE PLAN 30 MIN: CPT | Mod: 25

## 2024-10-10 PROCEDURE — 71045 X-RAY EXAM CHEST 1 VIEW: CPT | Mod: 26

## 2024-10-10 PROCEDURE — 99291 CRITICAL CARE FIRST HOUR: CPT

## 2024-10-10 PROCEDURE — 74018 RADEX ABDOMEN 1 VIEW: CPT | Mod: 26

## 2024-10-10 PROCEDURE — 99233 SBSQ HOSP IP/OBS HIGH 50: CPT

## 2024-10-10 RX ORDER — IPRATROPIUM BROMIDE AND ALBUTEROL SULFATE .5; 2.5 MG/3ML; MG/3ML
3 SOLUTION RESPIRATORY (INHALATION) EVERY 12 HOURS
Refills: 0 | Status: DISCONTINUED | OUTPATIENT
Start: 2024-10-10 | End: 2024-11-08

## 2024-10-10 RX ORDER — ACETYLCYSTEINE 200 MG/ML
4 INHALANT RESPIRATORY (INHALATION) EVERY 12 HOURS
Refills: 0 | Status: DISCONTINUED | OUTPATIENT
Start: 2024-10-10 | End: 2024-11-08

## 2024-10-10 RX ORDER — DEXTROSE 50 % IN WATER 50 %
12.5 SYRINGE (ML) INTRAVENOUS ONCE
Refills: 0 | Status: COMPLETED | OUTPATIENT
Start: 2024-10-10 | End: 2024-10-10

## 2024-10-10 RX ORDER — INSULIN LISPRO 100 U/ML
INJECTION, SOLUTION INTRAVENOUS; SUBCUTANEOUS EVERY 6 HOURS
Refills: 0 | Status: DISCONTINUED | OUTPATIENT
Start: 2024-10-10 | End: 2024-10-16

## 2024-10-10 RX ADMIN — HEPARIN SODIUM 5000 UNIT(S): 1000 INJECTION INTRAVENOUS; SUBCUTANEOUS at 22:52

## 2024-10-10 RX ADMIN — IPRATROPIUM BROMIDE AND ALBUTEROL SULFATE 3 MILLILITER(S): .5; 2.5 SOLUTION RESPIRATORY (INHALATION) at 17:07

## 2024-10-10 RX ADMIN — Medication 80 MILLIGRAM(S): at 05:11

## 2024-10-10 RX ADMIN — Medication 1 APPLICATION(S): at 05:12

## 2024-10-10 RX ADMIN — Medication 50 MILLIGRAM(S): at 22:45

## 2024-10-10 RX ADMIN — HEPARIN SODIUM 5000 UNIT(S): 1000 INJECTION INTRAVENOUS; SUBCUTANEOUS at 05:11

## 2024-10-10 RX ADMIN — LABETALOL HYDROCHLORIDE 300 MILLIGRAM(S): 200 TABLET, FILM COATED ORAL at 05:12

## 2024-10-10 RX ADMIN — TAMSULOSIN HYDROCHLORIDE 0.4 MILLIGRAM(S): 0.4 CAPSULE ORAL at 22:45

## 2024-10-10 RX ADMIN — Medication 12.5 GRAM(S): at 18:09

## 2024-10-10 RX ADMIN — Medication 15 MILLILITER(S): at 05:11

## 2024-10-10 RX ADMIN — LABETALOL HYDROCHLORIDE 300 MILLIGRAM(S): 200 TABLET, FILM COATED ORAL at 22:45

## 2024-10-10 RX ADMIN — IPRATROPIUM BROMIDE AND ALBUTEROL SULFATE 3 MILLILITER(S): .5; 2.5 SOLUTION RESPIRATORY (INHALATION) at 05:35

## 2024-10-10 RX ADMIN — Medication 667 MILLIGRAM(S): at 12:01

## 2024-10-10 RX ADMIN — ACETYLCYSTEINE 4 MILLILITER(S): 200 INHALANT RESPIRATORY (INHALATION) at 05:35

## 2024-10-10 RX ADMIN — Medication 50 MILLIGRAM(S): at 05:11

## 2024-10-10 RX ADMIN — Medication 667 MILLIGRAM(S): at 17:53

## 2024-10-10 RX ADMIN — Medication 1 APPLICATION(S): at 18:59

## 2024-10-10 RX ADMIN — Medication 100 MILLIGRAM(S): at 22:46

## 2024-10-10 RX ADMIN — Medication 80 MILLIGRAM(S): at 17:53

## 2024-10-10 RX ADMIN — AMLODIPINE BESYLATE 10 MILLIGRAM(S): 10 TABLET ORAL at 05:11

## 2024-10-10 RX ADMIN — Medication 100 MILLIGRAM(S): at 09:48

## 2024-10-10 RX ADMIN — Medication 80 MILLIGRAM(S): at 23:35

## 2024-10-10 RX ADMIN — Medication 667 MILLIGRAM(S): at 07:30

## 2024-10-10 RX ADMIN — Medication 80 MILLIGRAM(S): at 12:00

## 2024-10-10 RX ADMIN — ACETYLCYSTEINE 4 MILLILITER(S): 200 INHALANT RESPIRATORY (INHALATION) at 17:07

## 2024-10-10 RX ADMIN — Medication 15 MILLILITER(S): at 17:54

## 2024-10-10 RX ADMIN — Medication 40 MILLIGRAM(S): at 12:00

## 2024-10-10 NOTE — PROGRESS NOTE ADULT - SUBJECTIVE AND OBJECTIVE BOX
SUBJECTIVE AND OBJECTIVE:  Indication for Geriatrics and Palliative Care Services: Complex medical decision making    OVERNIGHT EVENTS: Has been on CPAP successfully for many hours.     Allergies    Allergy Status Unknown    Intolerances    MEDICATIONS  (STANDING):  acetylcysteine 10%  Inhalation 4 milliLiter(s) Inhalation every 12 hours  albuterol/ipratropium for Nebulization 3 milliLiter(s) Nebulizer every 12 hours  amLODIPine   Tablet 10 milliGRAM(s) Oral daily  calcium acetate 667 milliGRAM(s) Oral three times a day with meals  ceFAZolin   IVPB 1000 milliGRAM(s) IV Intermittent every 12 hours  chlorhexidine 0.12% Liquid 15 milliLiter(s) Oral Mucosa every 12 hours  chlorhexidine 2% Cloths 1 Application(s) Topical <User Schedule>  heparin   Injectable 5000 Unit(s) SubCutaneous every 8 hours  hydrALAZINE 50 milliGRAM(s) Oral every 8 hours  labetalol 300 milliGRAM(s) Enteral Tube every 8 hours  lactated ringers. 1000 milliLiter(s) (100 mL/Hr) IV Continuous <Continuous>  pantoprazole  Injectable 40 milliGRAM(s) IV Push daily  petrolatum Ophthalmic Ointment 1 Application(s) Both EYES two times a day  simethicone 80 milliGRAM(s) Chew every 6 hours  tamsulosin 0.4 milliGRAM(s) Oral at bedtime    MEDICATIONS  (PRN):  acetaminophen   Oral Liquid .. 650 milliGRAM(s) Oral every 6 hours PRN Temp greater or equal to 38C (100.4F), Mild Pain (1 - 3)  fentaNYL    Injectable 25 MICROGram(s) IV Push every 2 hours PRN agitation, severe pain, vent desynchrony      ITEMS UNCHECKED ARE NOT PRESENT    PRESENT SYMPTOMS: [X]Unable to self-report  Source if other than patient:  [ ]Family   [X]Team     Pain:  [ ]yes [X]no  QOL impact -   Location -   Aggravating factors -  Quality -   Radiation -   Timing -   Severity (0-10 scale):   Minimal acceptable level (0-10 scale):     Dyspnea:                           [ ]Mild [ ]Moderate [ ]Severe  Anxiety:                             [ ]Mild [ ]Moderate [ ]Severe  Fatigue:                             [ ]Mild [ ]Moderate [ ]Severe  Nausea:                             [ ]Mild [ ]Moderate [ ]Severe  Loss of appetite:              [ ]Mild [ ]Moderate [ ]Severe  Constipation:                    [ ]Mild [ ]Moderate [ ]Severe    Other Symptoms:  [X]All other review of systems negative    Palliative Performance Status Version 2:         10%      [email or web address].org/files/news/palliative_performance_scale_[email or web address].pdf    PHYSICAL EXAM:  Vital Signs Last 24 Hrs  T(C): 36.8 (10 Oct 2024 14:27), Max: 37 (10 Oct 2024 05:18)  T(F): 98.2 (10 Oct 2024 14:27), Max: 98.6 (10 Oct 2024 05:18)  HR: 77 (10 Oct 2024 16:00) (69 - 91)  BP: --  BP(mean): --  RR: 12 (10 Oct 2024 16:00) (8 - 22)  SpO2: 98% (10 Oct 2024 16:00) (94% - 99%)    Parameters below as of 10 Oct 2024 16:00  Patient On (Oxygen Delivery Method): ventilator     I&O's Summary    09 Oct 2024 07:01  -  10 Oct 2024 07:00  --------------------------------------------------------  IN: 3970 mL / OUT: 7610 mL / NET: -3640 mL    10 Oct 2024 07:01  -  10 Oct 2024 17:07  --------------------------------------------------------  IN: 100 mL / OUT: 1650 mL / NET: -1550 mL       GENERAL: [ ]Cachexia    [ ]Alert  [ ]Oriented x   [ ]Lethargic  [X]Unarousable  [ ]Verbal  [ ]Non-Verbal  Behavioral:   [ ]Anxiety  [ ]Delirium [ ]Agitation [ ]Unarousable  HEENT:  [ ]Normal   [ ]Dry mouth   [X]ET Tube/Trach  [ ]Oral lesions  PULMONARY:   [X]Clear [ ]Tachypnea  [ ]Audible excessive secretions   [ ]Rhonchi        [ ]Right [ ]Left [ ]Bilateral  [ ]Crackles        [ ]Right [ ]Left [ ]Bilateral  [ ]Wheezing     [ ]Right [ ]Left [ ]Bilateral  [ ]Diminished BS [ ] Right [ ]Left [ ]Bilateral  CARDIOVASCULAR:    [X]Regular [ ]Irregular [ ]Tachy  [ ]Alexi [ ]Murmur [ ]Other  GASTROINTESTINAL:  [X]Soft  [ ]Distended   [ ]+BS  [ ]Non tender [ ]Tender  [ ]Other [ ]PEG [X]OGT/ NGT   Last BM:   GENITOURINARY:  [ ]Normal [ ]Incontinent   [ ]Oliguria/Anuria   [X]Vuong  MUSCULOSKELETAL:   [ ]Normal   [ ]Weakness  [X]Bed/Wheelchair bound [ ]Edema  NEUROLOGIC:   [ ]No focal deficits  [X] Cognitive impairment  [ ] Dysphagia [ ]Dysarthria [ ] Paresis [ ]Other   SKIN:   [X]Normal  [ ]Rash  [ ]Other  [ ]Pressure ulcer(s) [ ]y [ ]n present on admission    CRITICAL CARE:  [ ]Shock Present  [ ]Septic [ ]Cardiogenic [ ]Neurologic [ ]Hypovolemic  [ ]Vasopressors [ ]Inotropes  [ ]Respiratory failure present [ ]Mechanical Ventilation [ ]Non-invasive ventilatory support [ ]High-Flow Mode: CPAP with PS, FiO2: 40, PEEP: 5, PS: 5, MAP: 6.1, PIP: 11  [ ]Acute  [ ]Chronic [ ]Hypoxic  [ ]Hypercarbic [ ]Other  [ ]Other organ failure     LABS:                        9.8    7.44  )-----------( 190      ( 10 Oct 2024 05:12 )             31.6   10-10    143  |  110[H]  |  54[H]  ----------------------------<  96  4.1   |  23  |  4.57[H]    Ca    8.4      10 Oct 2024 05:12  Phos  5.5     10-10  Mg     1.9     10-10        Urinalysis Basic - ( 10 Oct 2024 05:12 )    Color: x / Appearance: x / SG: x / pH: x  Gluc: 96 mg/dL / Ketone: x  / Bili: x / Urobili: x   Blood: x / Protein: x / Nitrite: x   Leuk Esterase: x / RBC: x / WBC x   Sq Epi: x / Non Sq Epi: x / Bacteria: x      RADIOLOGY & ADDITIONAL STUDIES:    Protein Calorie Malnutrition Present: [ ]mild [ ]moderate [ ]severe [ ]underweight [ ]morbid obesity  https://www.andeal.org/vault/2440/web/files/ONC/Table_Clinical%20Characteristics%20to%20Document%20Malnutrition-White%20JV%20et%20al%202012.pdf    Height (cm): 172.7 (09-30-24 @ 08:39)  Weight (kg): 80 (09-30-24 @ 09:04)  BMI (kg/m2): 26.8 (09-30-24 @ 09:04)    [X]PPSV2 < or = 30%  [ ]significant weight loss [ ]poor nutritional intake [ ]anasarca[X]Artificial Nutrition    REFERRALS:   [X]Chaplaincy  [ ]Hospice  [ ]Child Life  [X]Social Work [ ]Patient and Family Support [X]Case management [ ]Holistic Therapy [ ] Music therapy  [ ] Massage Therapy    DISCUSSION OF CASE: Family - obtained additional history and to provide emotional support;  Primary team - discussed plan of care

## 2024-10-10 NOTE — PROGRESS NOTE ADULT - ASSESSMENT
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  Stroke neurology consult  Stroke core measures  Will need MRI brain        PULM: Intubated  Full vent support. CPAP as tolerated  ABG, CXR  VAP bundle  ABG  trach/peg pending    CV:  SBP goal 100-160  labetalol 200mg tid  amlodipine 10  Katt      RENAL:   ANIKA though suspect CKD  Fluids: LR at 80cc/hr  Vuong catheter in place; monitor Is/Os  Na goal 140-150  BMPq12  Nephrology following  [id number]      GI:  Diet: TF  GI prophylaxis [] not indicated [x] PPI [] other:  Bowel regimen [] colace [x] senna [] other: miralax  Monitor LFTs    ENDO:   Goal euglycemia (-180)      HEME/ONC:  VTE prophylaxis: [x] SCDs [x] chemoprophylaxis       ID:   Mild leukocytosis  f/u pan culture  Ancef (10/2 - ) x7d  MRSA swab neg  f/u pan culture

## 2024-10-10 NOTE — PROGRESS NOTE ADULT - SUBJECTIVE AND OBJECTIVE BOX
NEUROCRITICAL CARE PROGRESS NOTE    GARETT DELEON   MRN-[id number]  Summary:  /  HPI:  Unknown age male, unknown past medical history (reported stroke and MI by coworkers) presented to [facility] with AMS, Pt was working at [employer] when he was found down by coworkers. EMS called and pt brought to [facility] ED. Intubated, sedated, started on cardene for SBPs in 200s. CT head showed brain stem bleed. Transferred to NSICU for further management.  (30 Sep 2024 12:55)      S/Overnight events:  GEORGE overnight. Neuro stable.    Hospital Course:  9/30: transferred from [facility]. A line placed. Versed dc'd. Timate Prasanth at bedside, states pt has family in [place], cannot confirm medications or PMH other than stroke and MI. 250cc bolus 3% given. LR switched to NS. hydralazine 25q8 started, 3% started, switched propofol to precedex   10/1: stability CTH done. Added labetalol, started TF. Palliative consulted. ethics consulted to determine surrogate. febrile 103, pan cx sent  10/2: BD 2, GEORGE overnight. TF resumed. Desatt'd to 80s, FiO2 inc. to 50. Fentanyl given, ABG, CXR ordered. Maxxed on precedex, started on propofol for DARIEN -4 - -5. Precedex dc'd. Duonebs, mucomyst, hypertonic added. 3% dc'd. Cardene dc'd. Start vanc/CTX. Increased labetalol 200q8. MRSA negative, dc'd vanc. ETT pulled back 2cm x 2, good positioning after confirmatory chest xray. Ethics attempting to establish HCP with family. Na 159, starting FW 250q6 for range 150-155.   10/3: BD3, GEORGE o/n, neuro stable. Na elevating, FW increased to 300q6. Dc'd bowel reg for diarrhea. vEEG started. SQH 5000q8 tonight.   10/4: BD 4, albumn bolus, incr. LR to 80 2/2 incr. in Cr, LR to 100cc/hr for uptrending Cr. Started 7% hypersal for 48hrs and SL atropine for inline/oral thick secretions. Dc'd CTX and started ancef for MSSA in the sputum. Nephrology consulted for CKD, f/u recs. SBP 170s, given hydralazine 10mg IVP.   10/5: BD5, o/n 10mg IVP hydralazine given for SBP 170s and started on hydralazine 25q8 via OGT. 10mg IV push labetalol for SBP > 160s. RT placed for diarrhea.   10/6: BD6, o/n FW increased to 350q4 per nephrology recs. IV tylenol for temp 100.6, SBp 160s presumed uncomfortable.   10/7: BD7, overnight pancultured for temp 101.8F.   10/8: BD8. GEORGE. Cr bumped. decreased LR to 75cc/hr. Adding simethicone ATC. incr hydralazine 50mgTID. Incr labetalol 300mgTID. Na 145, decreased FWF to 250q6. Start precedex. FENa consistent with intrinsic kidney injury. Pend repeat renal US. Retaining up to 1.3L, bladder scans q6, straight cath PRN  10/9: BD 9. GEORGE overnight. Neuro stable. abd xray for distention w non-specific gas pattern, OGT to LIWS for morning. duonebs/mucomyst to q8 for improving secretions. Changed tube feeds to Jevity 1.5 20cc/hr, low rate due to abdominal distention, nepro dense and more difficult to digest. Tolerating CPAP, confirmed by ABG.   10/10: BD 10. GEORGE overnight. Neuro stable.    Vital Signs Last 24 Hrs  T(C): 36.9 (09 Oct 2024 22:12), Max: 38.8 (09 Oct 2024 05:42)  T(F): 98.4 (09 Oct 2024 22:12), Max: 101.8 (09 Oct 2024 05:42)  HR: 75 (09 Oct 2024 22:00) (64 - 103)  BP: 143/88 (09 Oct 2024 02:00) (138/86 - 154/90)  BP(mean): 106 (09 Oct 2024 02:00) (106 - 119)  RR: 14 (09 Oct 2024 22:00) (8 - 19)  SpO2: 98% (09 Oct 2024 22:00) (94% - 99%)    Parameters below as of 09 Oct 2024 22:00  Patient On (Oxygen Delivery Method): ventilator, cpap    O2 Concentration (%): 40    Mode: CPAP with PS, FiO2: 40, PEEP: 5, PS: 5, MAP: 6, PIP: 10    I&O's Detail    08 Oct 2024 07:01  -  09 Oct 2024 07:00  --------------------------------------------------------  IN:    Dexmedetomidine: 76 mL    Free Water: 1030 mL    IV PiggyBack: 50 mL    Lactated Ringers: 1800 mL    Nepro with Carb Steady: 1440 mL  Total IN: 4396 mL    OUT:    Intermittent Catheterization - Urethral (mL): 1300 mL    Rectal Tube (mL): 500 mL    Voided (mL): 2120 mL  Total OUT: 3920 mL    Total NET: 476 mL      09 Oct 2024 07:01  -  09 Oct 2024 22:22  --------------------------------------------------------  IN:    Dexmedetomidine: 60 mL    Enteral Tube Flush: 330 mL    Free Water: 500 mL    IV PiggyBack: 100 mL    Jevity 1.5: 80 mL    Lactated Ringers: 150 mL    Lactated Ringers: 1400 mL  Total IN: 2620 mL    OUT:    Intermittent Catheterization - Urethral (mL): 3850 mL    Nasogastric/Oral tube (mL): 450 mL    Nepro with Carb Steady: 0 mL    Rectal Tube (mL): 250 mL    Voided (mL): 885 mL  Total OUT: 5435 mL    Total NET: -2815 mL    PHYSICAL EXAM:  Constitutional: Patient is resting comfortably in stretcher, in no acute distress.  Respiratory: +ETT, breathing non-labored, symmetrical chest wall movement  Cardiovascuar: RRR, no murmurs  Gastrointestinal: +OGT, abdomen soft, non tender  Genitourinary: exam deffered  Neurological:  Not opening eyes. Not oriented. Not following commands.  +cough/gag, +weak L corneal reflex, R corneal reflex absent  Motor: b/l UE extensor posture to noxious, b/l LE withdraw to noxious    LABS:                        10.6   8.43  )-----------( 196      ( 09 Oct 2024 05:30 )             34.2     10-09    143  |  111[H]  |  59[H]  ----------------------------<  110[H]  4.3   |  20[L]  |  5.67[H]    Ca    7.9[L]      09 Oct 2024 05:30  Phos  4.7     10-09  Mg     2.1     10-09        Urinalysis Basic - ( 09 Oct 2024 05:30 )    Color: x / Appearance: x / SG: x / pH: x  Gluc: 110 mg/dL / Ketone: x  / Bili: x / Urobili: x   Blood: x / Protein: x / Nitrite: x   Leuk Esterase: x / RBC: x / WBC x   Sq Epi: x / Non Sq Epi: x / Bacteria: x          CAPILLARY BLOOD GLUCOSE          Drug Levels: [] N/A    CSF Analysis: [] N/A      Allergies    Allergy Status Unknown    Intolerances      MEDICATIONS:  Antibiotics:  ceFAZolin   IVPB 1000 milliGRAM(s) IV Intermittent every 12 hours    Neuro:  acetaminophen   Oral Liquid .. 650 milliGRAM(s) Oral every 6 hours PRN  fentaNYL    Injectable 25 MICROGram(s) IV Push every 2 hours PRN    Anticoagulation:  heparin   Injectable 5000 Unit(s) SubCutaneous every 8 hours    OTHER:  acetylcysteine 10%  Inhalation 4 milliLiter(s) Inhalation every 8 hours  albuterol/ipratropium for Nebulization 3 milliLiter(s) Nebulizer every 8 hours  amLODIPine   Tablet 10 milliGRAM(s) Oral daily  chlorhexidine 0.12% Liquid 15 milliLiter(s) Oral Mucosa every 12 hours  chlorhexidine 2% Cloths 1 Application(s) Topical <User Schedule>  hydrALAZINE 50 milliGRAM(s) Oral every 8 hours  labetalol 300 milliGRAM(s) Enteral Tube every 8 hours  pantoprazole  Injectable 40 milliGRAM(s) IV Push daily  petrolatum Ophthalmic Ointment 1 Application(s) Both EYES two times a day  simethicone 80 milliGRAM(s) Chew every 6 hours  tamsulosin 0.4 milliGRAM(s) Oral at bedtime    IVF:  calcium acetate 667 milliGRAM(s) Oral three times a day with meals  lactated ringers. 1000 milliLiter(s) IV Continuous <Continuous>    CULTURES:  Culture Results:   Commensal iram consistent with body site (10-07 @ 04:17)  Culture Results:   No growth at 48 Hours (10-07 @ 03:30)    ASSESSMENT:  43 y/o PMH ?stroke/MI present to [facility] after collapsing at work. Decorticate posturing, vomiting, intubated for airway protection. 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 Transferred to [facility] for further management    Neuro:  - neuro/vitals q1  - pain control: fentanyl 25q2 prn, tylenol  - CTH 9/30: enlarged pontine hemorrhage, CTH 10/3 done read stable   - vEEG (10/3-4)- negative  - stroke core measures, stroke neuro following  - palliative and ethics following, attempting to establish GOC  - MRI brain w/ w/o contrast pending    CV:  - SBP<160  - HTN: norvasc 10mg, labetalol 300q8, hydralazine 50q8  - echo (9/30) EF 75%    Resp:  - Intubated, FVS, CPAP trials as tolerated  - duonebs/mucomyst q8    GI:  - TF via OGT @ 20cc/hr, nepro w/ banatrol    - bowel reg held for diarrhea, RT (10/5- )   - protonix while intubated  - standing simethicone for abdominal distension    Renal:  - LR@100cc/hr for uptending BUN/Cr  - nephro following, recs appreciated: phoslo TID  - hypernatremia:  q6hrs  - bladder scan q6hrs, sc PRN, Flomax 0.4mg at bedtime started for urinary retention   - trend BUN/Cr  - renal US 10/1: echogenicity c/w chronic med renal dz, repeat done 10/8: inc renal echogeicity, c/f medical renal disease w increased hydro     Endo:  - a1c 5.4    Heme:  - DVT ppx: SCDs, SQH 5000q8    ID:  - febrile, last pan cx 10/7  - MRSA swab negative, sputum MSSA + , s/p 1 dose vanc (10/2), CTX (10/2 - 10/4), Ancef (10/4-10/14)     Dispo: ADITHYA, full code, pending [facility]     D/w Dr. D'Amico, Dr. Reveles

## 2024-10-10 NOTE — PROGRESS NOTE ADULT - SUBJECTIVE AND OBJECTIVE BOX
NEUROCRITICAL CARE EVENING NOTE    DAY EVENTS:    - tolerated cpap, ongoing goc      VITALS/IMAGING/DATA  - Reviewed    ALLERGIES:   - Reviewed      MEDICATIONS:  - Reviewed    Physical Exam:  Unchanged from day time, please refer to note.

## 2024-10-10 NOTE — PROGRESS NOTE ADULT - PROBLEM SELECTOR PLAN 1
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 Patient continues to have brainstem reflexes but no purposeful movements.  - Neurosurgery following. No intervention planned  - Appreciate excellent NSICU care.  - GOC today: surrogate wishes for a tracheotomy

## 2024-10-10 NOTE — PROGRESS NOTE ADULT - ASSESSMENT
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    NEURO:  ICH score 3   -neuro check q4  -on going GOC  -PT/OT evaluation if signs of responsiveness     PULMONARY:  Currently requiring mechanical ventilation for air-way protection; CPAP trials as tolerated  would recommend early tracheostomy if within GOC    CARDIOVASCULAR:  monitor on telemetry   vitals q1  sbp goal 100-160  -c/w amlodipine 10mg, labetalol 300mg TID, hydralazine 50mg TID   TTE w/ hypertensive changes     GASTROENTEROLOGY:  would recommend PEG if within GOC  GI ppx : Protonix 40mg qd  loose stools, c/w rectal tube; d/c bowel regimen     RENAL/:  ANIKA on likely CKD ; likely 2/2 contrast induced nephropathy ; downtrending Cr   -c/w phoslo; avoid nephrotoxic medications   -check BMP qd  -strict i/o's ; c/w Vuong  -Na goal 135-145; c/w free water 250cc q6     ENDOCRINE:  A1c- 5.3    HEME/ONC:  DVT ppx: SQH  b/l SCDs    INFECTIOUS:   febrile in AM; 07/10 blood & sputum culture NGTD  ancef for MSSA+ sputum; continue for total 10 days    Code statues: full code, ongoing GOC w/ palliative

## 2024-10-10 NOTE — PROGRESS NOTE ADULT - SUBJECTIVE AND OBJECTIVE BOX
INTERVAL HISTORY: HPI:  Unknown age male, unknown past medical history (reported stroke and MI by coworkers) presented to [facility] with AMS, Pt was working at [employer] when he was found down by coworkers. EMS called and pt brought to [facility] ED. Intubated, sedated, started on cardene for SBPs in 200s. CT head showed brain stem bleed. Transferred to NSICU for further management.  (30 Sep 2024 12:55)    Drug Dosing Weight  Height (cm): 172.7 (30 Sep 2024 08:39)  Weight (kg): 80 (30 Sep 2024 09:04)  BMI (kg/m2): 26.8 (30 Sep 2024 09:04)  BSA (m2): 1.94 (30 Sep 2024 09:04)    PAST MEDICAL & SURGICAL HISTORY:  Acute myocardial infarction  CVA (cerebral vascular accident)    REVIEW OF SYSTEMS: [ ] Unable to Assess due to neurologic exam   [ ] All ROS addressed below are non-contributory, except:  Neuro: [ ] Headache [ ] Back pain [ ] Numbness [ ] Weakness [ ] Ataxia [ ] Dizziness [ ] Aphasia [ ] Dysarthria [ ] Visual disturbance  Resp: [ ] Shortness of breath/dyspnea, [ ] Orthopnea [ ] Cough  CV: [ ] Chest pain [ ] Palpitation [ ] Lightheadedness [ ] Syncope  Renal: [ ] Thirst [ ] Edema  GI: [ ] Nausea [ ] Emesis [ ] Abdominal pain [ ] Constipation [ ] Diarrhea  Hem: [ ] Hematemesis [ ] bright red blood per rectum  ID: [ ] Fever [ ] Chills [ ] Dysuria  ENT: [ ] Rhinorrhea    PHYSICAL EXAM:    General: intubated   Neurological: dysconjugate gaze, ? following with eyes(at times looks down to command),  absent R corneal reflex, L is still present, does not doll to movement of head, strong cough, able to maintain spontaneous breaths on CPAP, RUE extension, LUE minimal movement to nox, b/l LE WD to nox,  Pulmonary: Clear to Auscultation, No Rales, No Rhonchi, No Wheezes   Cardiovascular: S1, S2, Regular Rate and Rhythm   Gastrointestinal: Soft, Nontender, Nondistended   Extremities: No LE edema             ICU Vital Signs Last 24 Hrs  T(C): 37 (10 Oct 2024 08:55), Max: 37 (10 Oct 2024 05:18)  T(F): 98.6 (10 Oct 2024 08:55), Max: 98.6 (10 Oct 2024 05:18)  HR: 85 (10 Oct 2024 10:00) (65 - 91)  ABP: 143/79 (10 Oct 2024 10:00) (125/88 - 160/89)  ABP(mean): 101 (10 Oct 2024 10:00) (95 - 117)  RR: 12 (10 Oct 2024 10:00) (8 - 22)  SpO2: 97% (10 Oct 2024 10:00) (94% - 99%)      10-09-24 @ 07:01  -  10-10-24 @ 07:00  --------------------------------------------------------  IN: 3970 mL / OUT: 7610 mL / NET: -3640 mL    10-10-24 @ 07:01  -  10-10-24 @ 10:46  --------------------------------------------------------  IN: 100 mL / OUT: 0 mL / NET: 100 mL        Mode: AC/ CMV (Assist Control/ Continuous Mandatory Ventilation), FiO2: 40, PEEP: 5, PS: 5, MAP: 6.4, PIP: 10    acetaminophen   Oral Liquid .. 650 milliGRAM(s) Oral every 6 hours PRN  acetylcysteine 10%  Inhalation 4 milliLiter(s) Inhalation every 8 hours  albuterol/ipratropium for Nebulization 3 milliLiter(s) Nebulizer every 8 hours  amLODIPine   Tablet 10 milliGRAM(s) Oral daily  calcium acetate 667 milliGRAM(s) Oral three times a day with meals  ceFAZolin   IVPB 1000 milliGRAM(s) IV Intermittent every 12 hours  chlorhexidine 0.12% Liquid 15 milliLiter(s) Oral Mucosa every 12 hours  chlorhexidine 2% Cloths 1 Application(s) Topical <User Schedule>  fentaNYL    Injectable 25 MICROGram(s) IV Push every 2 hours PRN  heparin   Injectable 5000 Unit(s) SubCutaneous every 8 hours  hydrALAZINE 50 milliGRAM(s) Oral every 8 hours  labetalol 300 milliGRAM(s) Enteral Tube every 8 hours  lactated ringers. 1000 milliLiter(s) (100 mL/Hr) IV Continuous <Continuous>  pantoprazole  Injectable 40 milliGRAM(s) IV Push daily  petrolatum Ophthalmic Ointment 1 Application(s) Both EYES two times a day  simethicone 80 milliGRAM(s) Chew every 6 hours  tamsulosin 0.4 milliGRAM(s) Oral at bedtime      LABS:  Na: 143 (10-10 @ 05:12), 143 (10-09 @ 05:30), 145 (10-08 @ 04:42)  K: 4.1 (10-10 @ 05:12), 4.3 (10-09 @ 05:30), 3.9 (10-08 @ 04:42)  Cl: 110 (10-10 @ 05:12), 111 (10-09 @ 05:30), 110 (10-08 @ 04:42)  CO2: 23 (10-10 @ 05:12), 20 (10-09 @ 05:30), 24 (10-08 @ 04:42)  BUN: 54 (10-10 @ 05:12), 59 (10-09 @ 05:30), 58 (10-08 @ 04:42)  Cr: 4.57 (10-10 @ 05:12), 5.67 (10-09 @ 05:30), 6.18 (10-08 @ 04:42)  Glu: 96(10-10 @ 05:12), 110(10-09 @ 05:30), 126(10-08 @ 04:42)    Hgb: 9.8 (10-10 @ 05:12), 10.6 (10-09 @ 05:30), 10.3 (10-08 @ 04:42)  Hct: 31.6 (10-10 @ 05:12), 34.2 (10-09 @ 05:30), 33.5 (10-08 @ 04:42)  WBC: 7.44 (10-10 @ 05:12), 8.43 (10-09 @ 05:30), 9.50 (10-08 @ 04:42)  Plt: 190 (10-10 @ 05:12), 196 (10-09 @ 05:30), 199 (10-08 @ 04:42)      ABG - ( 10 Oct 2024 05:21 )  pH, Arterial: 7.42  pH, Blood: x     /  pCO2: 37    /  pO2: 89    / HCO3: 24    / Base Excess: -0.2  /  SaO2: 98.0

## 2024-10-11 LAB
ANION GAP SERPL CALC-SCNC: 10 MMOL/L — SIGNIFICANT CHANGE UP (ref 5–17)
BUN SERPL-MCNC: 43 MG/DL — HIGH (ref 7–23)
CALCIUM SERPL-MCNC: 8.1 MG/DL — LOW (ref 8.4–10.5)
CHLORIDE SERPL-SCNC: 108 MMOL/L — SIGNIFICANT CHANGE UP (ref 96–108)
CO2 SERPL-SCNC: 23 MMOL/L — SIGNIFICANT CHANGE UP (ref 22–31)
CREAT SERPL-MCNC: 3.71 MG/DL — HIGH (ref 0.5–1.3)
CYSTATIN C SERPL-MCNC: 2.61 MG/L — HIGH (ref 0.68–1.22)
EGFR: 19 ML/MIN/1.73M2 — LOW
EGFR: 19 ML/MIN/1.73M2 — LOW
GFR/BSA.PRED SERPLBLD CYS-BASED-ARV: 23 ML/MIN/1.73M2 — LOW
GLUCOSE BLDC GLUCOMTR-MCNC: 92 MG/DL — SIGNIFICANT CHANGE UP (ref 70–99)
GLUCOSE BLDC GLUCOMTR-MCNC: 97 MG/DL — SIGNIFICANT CHANGE UP (ref 70–99)
GLUCOSE SERPL-MCNC: 100 MG/DL — HIGH (ref 70–99)
HCT VFR BLD CALC: 32.3 % — LOW (ref 39–50)
HGB BLD-MCNC: 10.4 G/DL — LOW (ref 13–17)
MAGNESIUM SERPL-MCNC: 1.9 MG/DL — SIGNIFICANT CHANGE UP (ref 1.6–2.6)
MCHC RBC-ENTMCNC: 29.1 PG — SIGNIFICANT CHANGE UP (ref 27–34)
MCHC RBC-ENTMCNC: 32.2 GM/DL — SIGNIFICANT CHANGE UP (ref 32–36)
MCV RBC AUTO: 90.2 FL — SIGNIFICANT CHANGE UP (ref 80–100)
NRBC # BLD: 0 /100 WBCS — SIGNIFICANT CHANGE UP (ref 0–0)
NRBC BLD-RTO: 0 /100 WBCS — SIGNIFICANT CHANGE UP (ref 0–0)
PHOSPHATE SERPL-MCNC: 4 MG/DL — SIGNIFICANT CHANGE UP (ref 2.5–4.5)
PLATELET # BLD AUTO: 207 K/UL — SIGNIFICANT CHANGE UP (ref 150–400)
POTASSIUM SERPL-MCNC: 4.2 MMOL/L — SIGNIFICANT CHANGE UP (ref 3.5–5.3)
POTASSIUM SERPL-SCNC: 4.2 MMOL/L — SIGNIFICANT CHANGE UP (ref 3.5–5.3)
RBC # BLD: 3.58 M/UL — LOW (ref 4.2–5.8)
RBC # FLD: 12.2 % — SIGNIFICANT CHANGE UP (ref 10.3–14.5)
SODIUM SERPL-SCNC: 141 MMOL/L — SIGNIFICANT CHANGE UP (ref 135–145)
WBC # BLD: 7.54 K/UL — SIGNIFICANT CHANGE UP (ref 3.8–10.5)
WBC # FLD AUTO: 7.54 K/UL — SIGNIFICANT CHANGE UP (ref 3.8–10.5)

## 2024-10-11 PROCEDURE — 99255 IP/OBS CONSLTJ NEW/EST HI 80: CPT | Mod: GC

## 2024-10-11 PROCEDURE — 99291 CRITICAL CARE FIRST HOUR: CPT

## 2024-10-11 PROCEDURE — 71045 X-RAY EXAM CHEST 1 VIEW: CPT | Mod: 26

## 2024-10-11 RX ORDER — FENTANYL CITRATE-0.9 % NACL/PF 100MCG/2ML
50 SYRINGE (ML) INTRAVENOUS ONCE
Refills: 0 | Status: DISCONTINUED | OUTPATIENT
Start: 2024-10-11 | End: 2024-10-11

## 2024-10-11 RX ORDER — MAGNESIUM SULFATE 500 MG/ML
1 SYRINGE (ML) INJECTION ONCE
Refills: 0 | Status: COMPLETED | OUTPATIENT
Start: 2024-10-11 | End: 2024-10-11

## 2024-10-11 RX ADMIN — Medication 100 MILLIGRAM(S): at 22:16

## 2024-10-11 RX ADMIN — HEPARIN SODIUM 5000 UNIT(S): 1000 INJECTION INTRAVENOUS; SUBCUTANEOUS at 06:57

## 2024-10-11 RX ADMIN — Medication 80 MILLIGRAM(S): at 12:09

## 2024-10-11 RX ADMIN — LABETALOL HYDROCHLORIDE 300 MILLIGRAM(S): 200 TABLET, FILM COATED ORAL at 06:57

## 2024-10-11 RX ADMIN — Medication 1 APPLICATION(S): at 07:00

## 2024-10-11 RX ADMIN — SODIUM CHLORIDE 100 MILLILITER(S): 9 INJECTION, SOLUTION INTRAVENOUS at 09:32

## 2024-10-11 RX ADMIN — Medication 80 MILLIGRAM(S): at 17:59

## 2024-10-11 RX ADMIN — Medication 100 GRAM(S): at 07:00

## 2024-10-11 RX ADMIN — IPRATROPIUM BROMIDE AND ALBUTEROL SULFATE 3 MILLILITER(S): .5; 2.5 SOLUTION RESPIRATORY (INHALATION) at 17:49

## 2024-10-11 RX ADMIN — ACETYLCYSTEINE 4 MILLILITER(S): 200 INHALANT RESPIRATORY (INHALATION) at 05:56

## 2024-10-11 RX ADMIN — Medication 80 MILLIGRAM(S): at 06:57

## 2024-10-11 RX ADMIN — Medication 100 MILLIGRAM(S): at 09:31

## 2024-10-11 RX ADMIN — Medication 50 MILLIGRAM(S): at 22:15

## 2024-10-11 RX ADMIN — Medication 40 MILLIGRAM(S): at 12:10

## 2024-10-11 RX ADMIN — Medication 50 MICROGRAM(S): at 15:15

## 2024-10-11 RX ADMIN — Medication 1 APPLICATION(S): at 06:59

## 2024-10-11 RX ADMIN — AMLODIPINE BESYLATE 10 MILLIGRAM(S): 10 TABLET ORAL at 06:57

## 2024-10-11 RX ADMIN — Medication 650 MILLIGRAM(S): at 23:05

## 2024-10-11 RX ADMIN — Medication 25 MICROGRAM(S): at 01:30

## 2024-10-11 RX ADMIN — SODIUM CHLORIDE 100 MILLILITER(S): 9 INJECTION, SOLUTION INTRAVENOUS at 18:04

## 2024-10-11 RX ADMIN — Medication 650 MILLIGRAM(S): at 22:15

## 2024-10-11 RX ADMIN — LABETALOL HYDROCHLORIDE 300 MILLIGRAM(S): 200 TABLET, FILM COATED ORAL at 22:16

## 2024-10-11 RX ADMIN — Medication 667 MILLIGRAM(S): at 18:03

## 2024-10-11 RX ADMIN — IPRATROPIUM BROMIDE AND ALBUTEROL SULFATE 3 MILLILITER(S): .5; 2.5 SOLUTION RESPIRATORY (INHALATION) at 05:56

## 2024-10-11 RX ADMIN — Medication 15 MILLILITER(S): at 06:57

## 2024-10-11 RX ADMIN — Medication 50 MILLIGRAM(S): at 15:14

## 2024-10-11 RX ADMIN — HEPARIN SODIUM 5000 UNIT(S): 1000 INJECTION INTRAVENOUS; SUBCUTANEOUS at 15:16

## 2024-10-11 RX ADMIN — HEPARIN SODIUM 5000 UNIT(S): 1000 INJECTION INTRAVENOUS; SUBCUTANEOUS at 22:16

## 2024-10-11 RX ADMIN — Medication 15 MILLILITER(S): at 17:59

## 2024-10-11 RX ADMIN — Medication 50 MICROGRAM(S): at 15:45

## 2024-10-11 RX ADMIN — TAMSULOSIN HYDROCHLORIDE 0.4 MILLIGRAM(S): 0.4 CAPSULE ORAL at 22:16

## 2024-10-11 RX ADMIN — Medication 25 MICROGRAM(S): at 00:46

## 2024-10-11 RX ADMIN — Medication 667 MILLIGRAM(S): at 08:12

## 2024-10-11 RX ADMIN — ACETYLCYSTEINE 4 MILLILITER(S): 200 INHALANT RESPIRATORY (INHALATION) at 17:49

## 2024-10-11 RX ADMIN — LABETALOL HYDROCHLORIDE 300 MILLIGRAM(S): 200 TABLET, FILM COATED ORAL at 15:14

## 2024-10-11 RX ADMIN — Medication 667 MILLIGRAM(S): at 12:10

## 2024-10-11 RX ADMIN — Medication 50 MILLIGRAM(S): at 06:57

## 2024-10-11 RX ADMIN — Medication 1 APPLICATION(S): at 18:05

## 2024-10-11 NOTE — PROGRESS NOTE ADULT - ASSESSMENT
ASSESSMENT/PLAN:  73 y/o M with large acute pontine hemorrhage and subarachnoid extension  ICH score 3  Chronic infarcts  HTN      NEURO:  Q2 neurochecks  Sedation: off sedation  palliative/ethics following, ongoing [facility]  Stroke neurology consult  Stroke core measures  Will need MRI brain        PULM: Intubated  Full vent support. CPAP as tolerated  ABG, CXR  VAP bundle  ABG  trach/peg pending next week    CV:  SBP goal 100-160  labetalol 200mg tid  amlodipine 10  Katt      RENAL:   ANIKA though suspect CKD  Fluids: LR at 100cc/hr  Vuong catheter in place; monitor Is/Os  Na goal 140-150  BMPq12  Nephrology following        GI:  Diet: TF  GI prophylaxis [] not indicated [x] PPI [] other:  Bowel regimen [] colace [x] senna [] other: miralax  Monitor LFTs    ENDO:   Goal euglycemia (-180)      HEME/ONC:  VTE prophylaxis: [x] SCDs [x] chemoprophylaxis       ID:   Mild leukocytosis  f/u pan culture  Ancef (10/2 - ) x7d  MRSA swab neg  f/u pan culture

## 2024-10-11 NOTE — CONSULT NOTE ADULT - ASSESSMENT
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 Patient does not have a prior hx of abdominal surgeries, is not currently on any anticoagulation and is only on SQH. Patients son had a long discussion with palliative care regarding goals of care, is understanding the chance of full reocvery is poor, patient's son (SDM) would like to proceed with any measures to prolong his life which includes PEG and trach.     No apparent risk to proceeding with bedside PEG tube placement if patient continues to remain intubated. Plan to proceed with PEG placement on Wednesday next week 10/16/24. Patients poonam Aguayo was consented on patients behalf.     PLAN:  1. Plan to place PEG next week on Wednesday 10/16 with Dr. Trinh   2. General surgery will continue to follow   3. Rest of the care as per

## 2024-10-11 NOTE — PROGRESS NOTE ADULT - SUBJECTIVE AND OBJECTIVE BOX
NEUROCRITICAL CARE EVENING NOTE    DAY EVENTS:    - ongoing [facility]      VITALS/IMAGING/DATA  - Reviewed    ALLERGIES:   - Reviewed      MEDICATIONS:  - Reviewed    Physical Exam:  Unchanged from day time, please refer to note.

## 2024-10-11 NOTE — CHART NOTE - NSCHARTNOTEFT_GEN_A_CORE
Pulmonary called for tracheostomy.   He is an appropriate candidate for percutaneous tracheostomy.     Planning to perform on Monday at 1pm, pending consent from family.

## 2024-10-11 NOTE — CONSULT NOTE ADULT - ATTENDING COMMENTS
plan for tracheostomy 10/14 will need to be heavily sedated DARIEN -5 day of procedure for paralysis during procedure. Hold AC and tube feeds.
Patient seen and examined at bedside on 10/21/2024.  Agree with above.  s/p tracheostomy tube placement.  Abdomen soft, NT ND.    For EGD with PEG placement on 10/21/2024.
events noted, chart reviewed  41 yo M now with rising creatinine in setting of admission HTN emergency and suffered a brain stem stroke  + HTN, DM, CAD     intubated and ventilated  creat 435- non oliguric- UA + protein and UPC 3.9- will need to investigate further once he stabilizes  BP remains elevated  received hypertonic saline Na now 156- will d/w Neurosurg team re goals for BP and Na  No indication for renal replacement therapy at this time  needs renal sonogram

## 2024-10-11 NOTE — PROGRESS NOTE ADULT - SUBJECTIVE AND OBJECTIVE BOX
INTERVAL HISTORY: HPI:  Unknown age male, unknown past medical history (reported stroke and MI by coworkers) presented to [facility] with AMS, Pt was working at [employer] when he was found down by coworkers. EMS called and pt brought to [facility] ED. Intubated, sedated, started on cardene for SBPs in 200s. CT head showed brain stem bleed. Transferred to NSICU for further management.  (30 Sep 2024 12:55)    Drug Dosing Weight  Height (cm): 172.7 (30 Sep 2024 08:39)  Weight (kg): 80 (30 Sep 2024 09:04)  BMI (kg/m2): 26.8 (30 Sep 2024 09:04)  BSA (m2): 1.94 (30 Sep 2024 09:04)    PAST MEDICAL & SURGICAL HISTORY:  Acute myocardial infarction  CVA (cerebral vascular accident)    REVIEW OF SYSTEMS: [ ] Unable to Assess due to neurologic exam   [ ] All ROS addressed below are non-contributory, except:  Neuro: [ ] Headache [ ] Back pain [ ] Numbness [ ] Weakness [ ] Ataxia [ ] Dizziness [ ] Aphasia [ ] Dysarthria [ ] Visual disturbance  Resp: [ ] Shortness of breath/dyspnea, [ ] Orthopnea [ ] Cough  CV: [ ] Chest pain [ ] Palpitation [ ] Lightheadedness [ ] Syncope  Renal: [ ] Thirst [ ] Edema  GI: [ ] Nausea [ ] Emesis [ ] Abdominal pain [ ] Constipation [ ] Diarrhea  Hem: [ ] Hematemesis [ ] bright red blood per rectum  ID: [ ] Fever [ ] Chills [ ] Dysuria  ENT: [ ] Rhinorrhea    PHYSICAL EXAM:    General: intubated   Neurological: dysconjugate gaze, ? following with eyes(at times looks down to command),  absent R corneal reflex, L is still present, does not doll to movement of head, strong cough, able to maintain spontaneous breaths on CPAP, RUE extension, LUE minimal movement to nox, b/l LE WD to nox,  Pulmonary: Clear to Auscultation, No Rales, No Rhonchi, No Wheezes   Cardiovascular: S1, S2, Regular Rate and Rhythm   Gastrointestinal: Soft, Nontender, Nondistended   Extremities: No LE edema       ICU Vital Signs Last 24 Hrs  T(C): 37.5 (11 Oct 2024 09:20), Max: 38 (11 Oct 2024 00:36)  T(F): 99.5 (11 Oct 2024 09:20), Max: 100.4 (11 Oct 2024 00:36)  HR: 85 (11 Oct 2024 10:00) (72 - 88)  ABP: 142/77 (11 Oct 2024 10:00) (121/108 - 165/92)  ABP(mean): 100 (11 Oct 2024 10:00) (92 - 118)  RR: 12 (11 Oct 2024 09:00) (12 - 19)  SpO2: 98% (11 Oct 2024 10:00) (96% - 100%)      10-10-24 @ 07:01  -  10-11-24 @ 07:00  --------------------------------------------------------  IN: 1520 mL / OUT: 4295 mL / NET: -2775 mL    10-11-24 @ 07:01  -  10-11-24 @ 12:05  --------------------------------------------------------  IN: 700 mL / OUT: 575 mL / NET: 125 mL        Mode: CPAP with PS, FiO2: 40, PEEP: 5, MAP: 5, PIP: 10    acetaminophen   Oral Liquid .. 650 milliGRAM(s) Oral every 6 hours PRN  acetylcysteine 10%  Inhalation 4 milliLiter(s) Inhalation every 12 hours  albuterol/ipratropium for Nebulization 3 milliLiter(s) Nebulizer every 12 hours  amLODIPine   Tablet 10 milliGRAM(s) Oral daily  calcium acetate 667 milliGRAM(s) Oral three times a day with meals  ceFAZolin   IVPB 1000 milliGRAM(s) IV Intermittent every 12 hours  chlorhexidine 0.12% Liquid 15 milliLiter(s) Oral Mucosa every 12 hours  chlorhexidine 2% Cloths 1 Application(s) Topical <User Schedule>  fentaNYL    Injectable 25 MICROGram(s) IV Push every 2 hours PRN  heparin   Injectable 5000 Unit(s) SubCutaneous every 8 hours  hydrALAZINE 50 milliGRAM(s) Oral every 8 hours  insulin lispro (ADMELOG) corrective regimen sliding scale   SubCutaneous every 6 hours  labetalol 300 milliGRAM(s) Enteral Tube every 8 hours  lactated ringers. 1000 milliLiter(s) (100 mL/Hr) IV Continuous <Continuous>  pantoprazole  Injectable 40 milliGRAM(s) IV Push daily  petrolatum Ophthalmic Ointment 1 Application(s) Both EYES two times a day  simethicone 80 milliGRAM(s) Chew every 6 hours  tamsulosin 0.4 milliGRAM(s) Oral at bedtime      LABS:  Na: 141 (10-11 @ 05:30), 143 (10-10 @ 05:12), 143 (10-09 @ 05:30)  K: 4.2 (10-11 @ 05:30), 4.1 (10-10 @ 05:12), 4.3 (10-09 @ 05:30)  Cl: 108 (10-11 @ 05:30), 110 (10-10 @ 05:12), 111 (10-09 @ 05:30)  CO2: 23 (10-11 @ 05:30), 23 (10-10 @ 05:12), 20 (10-09 @ 05:30)  BUN: 43 (10-11 @ 05:30), 54 (10-10 @ 05:12), 59 (10-09 @ 05:30)  Cr: 3.71 (10-11 @ 05:30), 4.57 (10-10 @ 05:12), 5.67 (10-09 @ 05:30)  Glu: 100(10-11 @ 05:30), 96(10-10 @ 05:12), 110(10-09 @ 05:30)    Hgb: 10.4 (10-11 @ 05:30), 9.8 (10-10 @ 05:12), 10.6 (10-09 @ 05:30)  Hct: 32.3 (10-11 @ 05:30), 31.6 (10-10 @ 05:12), 34.2 (10-09 @ 05:30)  WBC: 7.54 (10-11 @ 05:30), 7.44 (10-10 @ 05:12), 8.43 (10-09 @ 05:30)  Plt: 207 (10-11 @ 05:30), 190 (10-10 @ 05:12), 196 (10-09 @ 05:30)    ABG - ( 10 Oct 2024 05:21 )  pH, Arterial: 7.42  pH, Blood: x     /  pCO2: 37    /  pO2: 89    / HCO3: 24    / Base Excess: -0.2  /  SaO2: 98.0

## 2024-10-11 NOTE — PROGRESS NOTE ADULT - ASSESSMENT
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    NEURO:  ICH score 3   -neuro check q4  -on going GOC  -PT/OT evaluation if signs of responsiveness     PULMONARY:  Currently requiring mechanical ventilation for air-way protection; CPAP trials as tolerated  GOC discussions: family would like to pursuit trach - ENT consulted     CARDIOVASCULAR:  monitor on telemetry   vitals q1  sbp goal 100-160  -c/w amlodipine 10mg, labetalol 300mg TID, hydralazine 50mg TID   TTE w/ hypertensive changes     GASTROENTEROLOGY:  c/w tube feeds, PEG consult pending   GI ppx : Protonix 40mg qd  loose stools, c/w rectal tube; d/c bowel regimen     RENAL/:  ANIKA on likely CKD ; likely 2/2 contrast induced nephropathy ; downtrending Cr   -c/w phoslo; avoid nephrotoxic medications   -check BMP qd  -strict i/o's ; c/w Vuong  -Na goal 135-145; c/w free water 250cc q6     ENDOCRINE:  A1c- 5.3    HEME/ONC:  DVT ppx: SQH  b/l SCDs    INFECTIOUS:   febrile in AM; 07/10 blood & sputum culture NGTD  ancef for MSSA+ sputum; continue for total 10 days    Code statues: full code, ongoing GOC w/ palliative

## 2024-10-11 NOTE — PROGRESS NOTE ADULT - SUBJECTIVE AND OBJECTIVE BOX
NEUROCRITICAL CARE PROGRESS NOTE    GARETT DELEON   MRN-[id number]  Summary:  /  HPI:  Unknown age male, unknown past medical history (reported stroke and MI by coworkers) presented to [facility] with AMS, Pt was working at [employer] when he was found down by coworkers. EMS called and pt brought to [facility] ED. Intubated, sedated, started on cardene for SBPs in 200s. CT head showed brain stem bleed. Transferred to NSICU for further management.  (30 Sep 2024 12:55)      S/Overnight events:  BD 11. GEORGE overnight. Neuro stable.     Hospital Course:  9/30: transferred from [facility]. A line placed. Versed dc'd. Roomate Prasanth at bedside, states pt has family in [place], cannot confirm medications or PMH other than stroke and MI. 250cc bolus 3% given. LR switched to NS. hydralazine 25q8 started, 3% started, switched propofol to precedex   10/1: stability CTH done. Added labetalol, started TF. Palliative consulted. ethics consulted to determine surrogate. febrile 103, pan cx sent  10/2: BD 2, GEORGE overnight. TF resumed. Desatt'd to 80s, FiO2 inc. to 50. Fentanyl given, ABG, CXR ordered. Maxxed on precedex, started on propofol for DARIEN -4 - -5. Precedex dc'd. Duonebs, mucomyst, hypertonic added. 3% dc'd. Cardene dc'd. Start vanc/CTX. Increased labetalol 200q8. MRSA negative, dc'd vanc. ETT pulled back 2cm x 2, good positioning after confirmatory chest xray. Ethics attempting to establish HCP with family. Na 159, starting FW 250q6 for range 150-155.   10/3: BD3, GEORGE o/n, neuro stable. Na elevating, FW increased to 300q6. Dc'd bowel reg for diarrhea. vEEG started. SQH 5000q8 tonight.   10/4: BD 4, albumn bolus, incr. LR to 80 2/2 incr. in Cr, LR to 100cc/hr for uptrending Cr. Started 7% hypersal for 48hrs and SL atropine for inline/oral thick secretions. Dc'd CTX and started ancef for MSSA in the sputum. Nephrology consulted for CKD, f/u recs. SBP 170s, given hydralazine 10mg IVP.   10/5: BD5, o/n 10mg IVP hydralazine given for SBP 170s and started on hydralazine 25q8 via OGT. 10mg IV push labetalol for SBP > 160s. RT placed for diarrhea.   10/6: BD6, o/n FW increased to 350q4 per nephrology recs. IV tylenol for temp 100.6, SBp 160s presumed uncomfortable.   10/7: BD7, overnight pancultured for temp 101.8F.   10/8: BD8. GEORGE. Cr bumped. decreased LR to 75cc/hr. Adding simethicone ATC. incr hydralazine 50mgTID. Incr labetalol 300mgTID. Na 145, decreased FWF to 250q6. Start precedex. FENa consistent with intrinsic kidney injury. Pend repeat renal US. Retaining up to 1.3L, bladder scans q6, straight cath PRN  10/9: BD 9. GEORGE overnight. Neuro stable. abd xray for distention w non-specific gas pattern, OGT to LIWS for morning. duonebs/mucomyst to q8 for improving secretions. Changed tube feeds to Jevity 1.5 20cc/hr, low rate due to abdominal distention, nepro dense and more difficult to digest. Tolerating CPAP, confirmed by ABG.   10/10: BD 10. GEORGE overnight. Neuro stable. (+) gabriel for urinary retention on bladder scan. inc TF to goal rate of 40cc/hr. family leaning toward pursuing trach/PEG. 1/2 amp for FS 81.   10/11: BD 11. GEORGE overnight. Neuro stable.     Vital Signs Last 24 Hrs  T(C): 36.8 (10 Oct 2024 22:26), Max: 37 (10 Oct 2024 05:18)  T(F): 98.2 (10 Oct 2024 22:26), Max: 98.6 (10 Oct 2024 05:18)  HR: 78 (11 Oct 2024 00:00) (73 - 91)  BP: --  BP(mean): --  RR: 18 (11 Oct 2024 00:00) (11 - 22)  SpO2: 98% (11 Oct 2024 00:00) (94% - 100%)    Parameters below as of 11 Oct 2024 00:00  Patient On (Oxygen Delivery Method): ventilator    O2 Concentration (%): 40    Mode: AC/ CMV (Assist Control/ Continuous Mandatory Ventilation), RR (machine): 12, TV (machine): 430, FiO2: 40, PEEP: 5, ITime: 1, MAP: 8.6, PIP: 17    I&O's Detail    09 Oct 2024 07:01  -  10 Oct 2024 07:00  --------------------------------------------------------  IN:    Dexmedetomidine: 60 mL    Enteral Tube Flush: 510 mL    Free Water: 750 mL    IV PiggyBack: 100 mL    Jevity 1.5: 200 mL    Lactated Ringers: 150 mL    Lactated Ringers: 2200 mL  Total IN: 3970 mL    OUT:    Intermittent Catheterization - Urethral (mL): 5825 mL    Nasogastric/Oral tube (mL): 450 mL    Nepro with Carb Steady: 0 mL    Rectal Tube (mL): 450 mL    Voided (mL): 885 mL  Total OUT: 7610 mL    Total NET: -3640 mL      10 Oct 2024 07:01  -  11 Oct 2024 00:31  --------------------------------------------------------  IN:    Lactated Ringers: 600 mL  Total IN: 600 mL    OUT:    Indwelling Catheter - Urethral (mL): 3275 mL  Total OUT: 3275 mL    Total NET: -2675 mL    PHYSICAL EXAM:  Constitutional: Patient is resting comfortably in stretcher, in no acute distress.  Respiratory: +ETT, breathing non-labored, symmetrical chest wall movement  Cardiovascuar: RRR, no murmurs  Gastrointestinal: +OGT, abdomen soft, non tender  Genitourinary: exam deffered  Neurological:  Not opening eyes. Not oriented. Not following commands.  +cough/gag, +weak L corneal reflex, R corneal reflex absent  Motor: b/l UE extensor posture to noxious, b/l LE withdraw to noxious      LABS:                        9.8    7.44  )-----------( 190      ( 10 Oct 2024 05:12 )             31.6     10-10    143  |  110[H]  |  54[H]  ----------------------------<  96  4.1   |  23  |  4.57[H]    Ca    8.4      10 Oct 2024 05:12  Phos  5.5     10-10  Mg     1.9     10-10        Urinalysis Basic - ( 10 Oct 2024 05:12 )    Color: x / Appearance: x / SG: x / pH: x  Gluc: 96 mg/dL / Ketone: x  / Bili: x / Urobili: x   Blood: x / Protein: x / Nitrite: x   Leuk Esterase: x / RBC: x / WBC x   Sq Epi: x / Non Sq Epi: x / Bacteria: x          CAPILLARY BLOOD GLUCOSE      POCT Blood Glucose.: 107 mg/dL (10 Oct 2024 23:40)  POCT Blood Glucose.: 81 mg/dL (10 Oct 2024 17:27)  POCT Blood Glucose.: 86 mg/dL (10 Oct 2024 11:19)      Drug Levels: [] N/A    CSF Analysis: [] N/A      Allergies    Allergy Status Unknown    Intolerances      MEDICATIONS:  Antibiotics:  ceFAZolin   IVPB 1000 milliGRAM(s) IV Intermittent every 12 hours    Neuro:  acetaminophen   Oral Liquid .. 650 milliGRAM(s) Oral every 6 hours PRN  fentaNYL    Injectable 25 MICROGram(s) IV Push every 2 hours PRN    Anticoagulation:  heparin   Injectable 5000 Unit(s) SubCutaneous every 8 hours    OTHER:  acetylcysteine 10%  Inhalation 4 milliLiter(s) Inhalation every 12 hours  albuterol/ipratropium for Nebulization 3 milliLiter(s) Nebulizer every 12 hours  amLODIPine   Tablet 10 milliGRAM(s) Oral daily  chlorhexidine 0.12% Liquid 15 milliLiter(s) Oral Mucosa every 12 hours  chlorhexidine 2% Cloths 1 Application(s) Topical <User Schedule>  hydrALAZINE 50 milliGRAM(s) Oral every 8 hours  insulin lispro (ADMELOG) corrective regimen sliding scale   SubCutaneous every 6 hours  labetalol 300 milliGRAM(s) Enteral Tube every 8 hours  pantoprazole  Injectable 40 milliGRAM(s) IV Push daily  petrolatum Ophthalmic Ointment 1 Application(s) Both EYES two times a day  simethicone 80 milliGRAM(s) Chew every 6 hours  tamsulosin 0.4 milliGRAM(s) Oral at bedtime    IVF:  calcium acetate 667 milliGRAM(s) Oral three times a day with meals  lactated ringers. 1000 milliLiter(s) IV Continuous <Continuous>    CULTURES:  Culture Results:   Commensal iram consistent with body site (10-07 @ 04:17)  Culture Results:   No growth at 72 Hours (10-07 @ 03:30)      ASSESSMENT:  45 y/o PMH ?stroke/MI present to [facility] after collapsing at work. Decorticate posturing, vomiting, intubated for airway protection. 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 Transferred to [facility] for further management    Neuro:  - neuro/vitals q1  - pain control: fentanyl 25q2 prn, tylenol  - CTH 9/30: enlarged pontine hemorrhage, CTH 10/3 done read stable   - vEEG (10/3-4)- negative  - stroke core measures, stroke neuro following  - MRI brain w/ w/o contrast pending    CV:  - SBP<160  - HTN: norvasc 10mg, labetalol 300q8, hydralazine 50q8  - echo (9/30) EF 75%    Resp:  - Intubated, FVS, CPAP trials as tolerated  - duonebs/mucomyst q12    GI:  - TF via OGT @ 40cc/hr, nepro w/ banatrol    - bowel reg held for diarrhea, RT (10/5- )   - protonix while intubated  - standing simethicone for abdominal distension    Renal:  - LR@100cc/hr for uptending BUN/Cr  - nephro following, recs appreciated: phoslo TID  - (+) gabriel   - hypernatremia:  q6hrs  - bladder scan q6hrs, sc PRN, Flomax 0.4mg at bedtime started for urinary retention   - trend BUN/Cr  - renal US 10/1: echogenicity c/w chronic med renal dz, repeat done 10/8: inc renal echogeicity, c/f medical renal disease w increased hydro     Endo:  - ISS, a1c 5.4    Heme:  - DVT ppx: SCDs, SQH 5000q8    ID:  - febrile, last pan cx 10/7  - MRSA swab negative, sputum MSSA + , s/p 1 dose vanc (10/2), CTX (10/2 - 10/4), Ancef (10/4-10/14)     Dispo: NSICU, full code, pending PT/OT    D/w Dr. D'Amico, Dr. Llanes

## 2024-10-11 NOTE — CONSULT NOTE ADULT - SUBJECTIVE AND OBJECTIVE BOX
44 male, with PMHx of stroke and MI as reported by patients coworkes, he presented to [facility] with AMS, Pt was working at [employer] when he was found down by coworkers. EMS called and pt brought to [facility] ED. Intubated, sedated, started on cardene for SBPs in 200s. CT head showed brain stem bleed. Transferred to NSICU on 09/30/24 for further management of the pontine hemorrhage with SAH extension. Patient currently spontaneously breathing with CPAP, neuro exam slightly improving with hopes of possible partial recovery although very limited. Patients surrogate decision maker (Olivier), the patients son, had an extensive discussiong with Dr. Davison about GOC and son wants a trachoestomy and a PEG placement to prolong his life       PMH: MI and stroke   PSHx: no abdominal surgeries   Medications: no home meds on file  Allergies: NKDA   Family Hx: Denies family hx of IBS, Crohn's, UC, or colon cancer.  Last colonoscopy: unknown   Last EGD: unkown     acetaminophen   Oral Liquid .. 650 milliGRAM(s) Oral every 6 hours PRN  acetylcysteine 10%  Inhalation 4 milliLiter(s) Inhalation every 12 hours  albuterol/ipratropium for Nebulization 3 milliLiter(s) Nebulizer every 12 hours  amLODIPine   Tablet 10 milliGRAM(s) Oral daily  calcium acetate 667 milliGRAM(s) Oral three times a day with meals  ceFAZolin   IVPB 1000 milliGRAM(s) IV Intermittent every 12 hours  chlorhexidine 0.12% Liquid 15 milliLiter(s) Oral Mucosa every 12 hours  chlorhexidine 2% Cloths 1 Application(s) Topical <User Schedule>  fentaNYL    Injectable 25 MICROGram(s) IV Push every 2 hours PRN  heparin   Injectable 5000 Unit(s) SubCutaneous every 8 hours  hydrALAZINE 50 milliGRAM(s) Oral every 8 hours  insulin lispro (ADMELOG) corrective regimen sliding scale   SubCutaneous every 6 hours  labetalol 300 milliGRAM(s) Enteral Tube every 8 hours  lactated ringers. 1000 milliLiter(s) IV Continuous <Continuous>  pantoprazole  Injectable 40 milliGRAM(s) IV Push daily  petrolatum Ophthalmic Ointment 1 Application(s) Both EYES two times a day  simethicone 80 milliGRAM(s) Chew every 6 hours  tamsulosin 0.4 milliGRAM(s) Oral at bedtime      T(C): 37.5 (10-11-24 @ 09:20), Max: 38 (10-11-24 @ 00:36)  HR: 86 (10-11-24 @ 12:17) (72 - 88)  BP: --  RR: 12 (10-11-24 @ 09:00) (12 - 19)  SpO2: 98% (10-11-24 @ 12:17) (96% - 100%)      10-10-24 @ 07:01  -  10-11-24 @ 07:00  --------------------------------------------------------  IN: 1520 mL / OUT: 4295 mL / NET: -2775 mL    10-11-24 @ 07:01  -  10-11-24 @ 12:27  --------------------------------------------------------  IN: 700 mL / OUT: 575 mL / NET: 125 mL    Physical Exam  General: AAOx3, NAD, laying comfortably in bed  Cardio: S1,S2, No MRG  Pulm: Nonlabored breathing  Abdomen: soft, non-distended, non-tender, no scars noted. Has a rectal tube in place and is making BMs.   Extremities: WWP, peripheral pulses appreciated      LABS:                        10.4   7.54  )-----------( 207      ( 11 Oct 2024 05:30 )             32.3     10-11    141  |  108  |  43[H]  ----------------------------<  100[H]  4.2   |  23  |  3.71[H]    Ca    8.1[L]      11 Oct 2024 05:30  Phos  4.0     10-11  Mg     1.9     10-11        primary

## 2024-10-12 LAB
ANION GAP SERPL CALC-SCNC: 12 MMOL/L — SIGNIFICANT CHANGE UP (ref 5–17)
BUN SERPL-MCNC: 39 MG/DL — HIGH (ref 7–23)
CALCIUM SERPL-MCNC: 8.3 MG/DL — LOW (ref 8.4–10.5)
CHLORIDE SERPL-SCNC: 105 MMOL/L — SIGNIFICANT CHANGE UP (ref 96–108)
CO2 SERPL-SCNC: 22 MMOL/L — SIGNIFICANT CHANGE UP (ref 22–31)
CREAT SERPL-MCNC: 3.54 MG/DL — HIGH (ref 0.5–1.3)
CULTURE RESULTS: SIGNIFICANT CHANGE UP
CULTURE RESULTS: SIGNIFICANT CHANGE UP
EGFR: 21 ML/MIN/1.73M2 — LOW
EGFR: 21 ML/MIN/1.73M2 — LOW
GLUCOSE BLDC GLUCOMTR-MCNC: 101 MG/DL — HIGH (ref 70–99)
GLUCOSE BLDC GLUCOMTR-MCNC: 102 MG/DL — HIGH (ref 70–99)
GLUCOSE BLDC GLUCOMTR-MCNC: 102 MG/DL — HIGH (ref 70–99)
GLUCOSE BLDC GLUCOMTR-MCNC: 90 MG/DL — SIGNIFICANT CHANGE UP (ref 70–99)
GLUCOSE BLDC GLUCOMTR-MCNC: 91 MG/DL — SIGNIFICANT CHANGE UP (ref 70–99)
GLUCOSE BLDC GLUCOMTR-MCNC: 96 MG/DL — SIGNIFICANT CHANGE UP (ref 70–99)
GLUCOSE SERPL-MCNC: 104 MG/DL — HIGH (ref 70–99)
HCT VFR BLD CALC: 33.9 % — LOW (ref 39–50)
HGB BLD-MCNC: 10.8 G/DL — LOW (ref 13–17)
MAGNESIUM SERPL-MCNC: 2.2 MG/DL — SIGNIFICANT CHANGE UP (ref 1.6–2.6)
MCHC RBC-ENTMCNC: 28.8 PG — SIGNIFICANT CHANGE UP (ref 27–34)
MCHC RBC-ENTMCNC: 31.9 GM/DL — LOW (ref 32–36)
MCV RBC AUTO: 90.4 FL — SIGNIFICANT CHANGE UP (ref 80–100)
NRBC # BLD: 0 /100 WBCS — SIGNIFICANT CHANGE UP (ref 0–0)
NRBC BLD-RTO: 0 /100 WBCS — SIGNIFICANT CHANGE UP (ref 0–0)
PHOSPHATE SERPL-MCNC: 4.6 MG/DL — HIGH (ref 2.5–4.5)
PLATELET # BLD AUTO: 250 K/UL — SIGNIFICANT CHANGE UP (ref 150–400)
POTASSIUM SERPL-MCNC: 4.2 MMOL/L — SIGNIFICANT CHANGE UP (ref 3.5–5.3)
POTASSIUM SERPL-SCNC: 4.2 MMOL/L — SIGNIFICANT CHANGE UP (ref 3.5–5.3)
RAPID RVP RESULT: SIGNIFICANT CHANGE UP
RBC # BLD: 3.75 M/UL — LOW (ref 4.2–5.8)
RBC # FLD: 12.4 % — SIGNIFICANT CHANGE UP (ref 10.3–14.5)
SARS-COV-2 RNA SPEC QL NAA+PROBE: SIGNIFICANT CHANGE UP
SODIUM SERPL-SCNC: 139 MMOL/L — SIGNIFICANT CHANGE UP (ref 135–145)
SPECIMEN SOURCE: SIGNIFICANT CHANGE UP
SPECIMEN SOURCE: SIGNIFICANT CHANGE UP
WBC # BLD: 7.29 K/UL — SIGNIFICANT CHANGE UP (ref 3.8–10.5)
WBC # FLD AUTO: 7.29 K/UL — SIGNIFICANT CHANGE UP (ref 3.8–10.5)

## 2024-10-12 PROCEDURE — 71045 X-RAY EXAM CHEST 1 VIEW: CPT | Mod: 26

## 2024-10-12 PROCEDURE — 99291 CRITICAL CARE FIRST HOUR: CPT

## 2024-10-12 PROCEDURE — 70553 MRI BRAIN STEM W/O & W/DYE: CPT | Mod: 26

## 2024-10-12 RX ORDER — SODIUM CHLORIDE 9 G/1000ML
1000 INJECTION, SOLUTION INTRAVENOUS
Refills: 0 | Status: DISCONTINUED | OUTPATIENT
Start: 2024-10-12 | End: 2024-10-13

## 2024-10-12 RX ORDER — FENTANYL CITRATE-0.9 % NACL/PF 100MCG/2ML
25 SYRINGE (ML) INTRAVENOUS ONCE
Refills: 0 | Status: DISCONTINUED | OUTPATIENT
Start: 2024-10-12 | End: 2024-10-12

## 2024-10-12 RX ADMIN — LABETALOL HYDROCHLORIDE 300 MILLIGRAM(S): 200 TABLET, FILM COATED ORAL at 13:10

## 2024-10-12 RX ADMIN — LABETALOL HYDROCHLORIDE 300 MILLIGRAM(S): 200 TABLET, FILM COATED ORAL at 23:06

## 2024-10-12 RX ADMIN — Medication 1 APPLICATION(S): at 07:15

## 2024-10-12 RX ADMIN — Medication 80 MILLIGRAM(S): at 23:06

## 2024-10-12 RX ADMIN — Medication 50 MILLIGRAM(S): at 13:09

## 2024-10-12 RX ADMIN — ACETYLCYSTEINE 4 MILLILITER(S): 200 INHALANT RESPIRATORY (INHALATION) at 05:36

## 2024-10-12 RX ADMIN — Medication 100 MILLIGRAM(S): at 23:08

## 2024-10-12 RX ADMIN — HEPARIN SODIUM 5000 UNIT(S): 1000 INJECTION INTRAVENOUS; SUBCUTANEOUS at 07:13

## 2024-10-12 RX ADMIN — Medication 667 MILLIGRAM(S): at 12:19

## 2024-10-12 RX ADMIN — TAMSULOSIN HYDROCHLORIDE 0.4 MILLIGRAM(S): 0.4 CAPSULE ORAL at 23:07

## 2024-10-12 RX ADMIN — LABETALOL HYDROCHLORIDE 300 MILLIGRAM(S): 200 TABLET, FILM COATED ORAL at 07:14

## 2024-10-12 RX ADMIN — Medication 15 MILLILITER(S): at 18:13

## 2024-10-12 RX ADMIN — Medication 1 APPLICATION(S): at 18:16

## 2024-10-12 RX ADMIN — AMLODIPINE BESYLATE 10 MILLIGRAM(S): 10 TABLET ORAL at 07:13

## 2024-10-12 RX ADMIN — HEPARIN SODIUM 5000 UNIT(S): 1000 INJECTION INTRAVENOUS; SUBCUTANEOUS at 16:19

## 2024-10-12 RX ADMIN — Medication 667 MILLIGRAM(S): at 06:00

## 2024-10-12 RX ADMIN — Medication 100 MILLIGRAM(S): at 11:00

## 2024-10-12 RX ADMIN — ACETYLCYSTEINE 4 MILLILITER(S): 200 INHALANT RESPIRATORY (INHALATION) at 17:17

## 2024-10-12 RX ADMIN — Medication 667 MILLIGRAM(S): at 09:44

## 2024-10-12 RX ADMIN — IPRATROPIUM BROMIDE AND ALBUTEROL SULFATE 3 MILLILITER(S): .5; 2.5 SOLUTION RESPIRATORY (INHALATION) at 17:17

## 2024-10-12 RX ADMIN — IPRATROPIUM BROMIDE AND ALBUTEROL SULFATE 3 MILLILITER(S): .5; 2.5 SOLUTION RESPIRATORY (INHALATION) at 05:35

## 2024-10-12 RX ADMIN — Medication 80 MILLIGRAM(S): at 11:28

## 2024-10-12 RX ADMIN — Medication 80 MILLIGRAM(S): at 18:13

## 2024-10-12 RX ADMIN — Medication 15 MILLILITER(S): at 07:13

## 2024-10-12 RX ADMIN — Medication 80 MILLIGRAM(S): at 07:14

## 2024-10-12 RX ADMIN — Medication 40 MILLIGRAM(S): at 11:27

## 2024-10-12 RX ADMIN — Medication 1 APPLICATION(S): at 07:26

## 2024-10-12 RX ADMIN — HEPARIN SODIUM 5000 UNIT(S): 1000 INJECTION INTRAVENOUS; SUBCUTANEOUS at 23:06

## 2024-10-12 RX ADMIN — Medication 50 MILLIGRAM(S): at 23:06

## 2024-10-12 RX ADMIN — Medication 25 MICROGRAM(S): at 16:00

## 2024-10-12 RX ADMIN — Medication 80 MILLIGRAM(S): at 01:12

## 2024-10-12 RX ADMIN — Medication 50 MILLIGRAM(S): at 07:14

## 2024-10-12 RX ADMIN — Medication 25 MICROGRAM(S): at 14:58

## 2024-10-12 RX ADMIN — SODIUM CHLORIDE 80 MILLILITER(S): 9 INJECTION, SOLUTION INTRAVENOUS at 14:58

## 2024-10-12 NOTE — PROGRESS NOTE ADULT - SUBJECTIVE AND OBJECTIVE BOX
INTERVAL HISTORY: HPI:  Unknown age male, unknown past medical history (reported stroke and MI by coworkers) presented to [facility] with AMS, Pt was working at [employer] when he was found down by coworkers. EMS called and pt brought to [facility] ED. Intubated, sedated, started on cardene for SBPs in 200s. CT head showed brain stem bleed. Transferred to NSICU for further management.  (30 Sep 2024 12:55)    Drug Dosing Weight  Height (cm): 172.7 (30 Sep 2024 08:39)  Weight (kg): 80 (30 Sep 2024 09:04)  BMI (kg/m2): 26.8 (30 Sep 2024 09:04)  BSA (m2): 1.94 (30 Sep 2024 09:04)    PAST MEDICAL & SURGICAL HISTORY:  Acute myocardial infarction  CVA (cerebral vascular accident)    REVIEW OF SYSTEMS: [ ] Unable to Assess due to neurologic exam   [ ] All ROS addressed below are non-contributory, except:  Neuro: [ ] Headache [ ] Back pain [ ] Numbness [ ] Weakness [ ] Ataxia [ ] Dizziness [ ] Aphasia [ ] Dysarthria [ ] Visual disturbance  Resp: [ ] Shortness of breath/dyspnea, [ ] Orthopnea [ ] Cough  CV: [ ] Chest pain [ ] Palpitation [ ] Lightheadedness [ ] Syncope  Renal: [ ] Thirst [ ] Edema  GI: [ ] Nausea [ ] Emesis [ ] Abdominal pain [ ] Constipation [ ] Diarrhea  Hem: [ ] Hematemesis [ ] bright red blood per rectum  ID: [ ] Fever [ ] Chills [ ] Dysuria  ENT: [ ] Rhinorrhea    PHYSICAL EXAM:    General: intubated   Neurological: dysconjugate gaze, ? following with eyes(at times looks down to command),  absent R corneal reflex, L is still present, does not doll to movement of head, strong cough, able to maintain spontaneous breaths on CPAP, RUE extension, LUE minimal movement to nox, b/l LE WD to nox,  Pulmonary: Clear to Auscultation, No Rales, No Rhonchi, No Wheezes   Cardiovascular: S1, S2, Regular Rate and Rhythm   Gastrointestinal: Soft, Nontender, Nondistended   Extremities: No LE edema     ICU Vital Signs Last 24 Hrs  T(C): 36.4 (12 Oct 2024 09:03), Max: 38.4 (11 Oct 2024 22:50)  T(F): 97.5 (12 Oct 2024 09:03), Max: 101.1 (11 Oct 2024 22:50)  HR: 79 (12 Oct 2024 12:43) (76 - 94)  ABP: 166/95 (12 Oct 2024 10:00) (136/72 - 169/96)  ABP(mean): 122 (12 Oct 2024 10:00) (94 - 124)  RR: 10 (12 Oct 2024 10:00) (9 - 16)  SpO2: 97% (12 Oct 2024 12:43) (93% - 100%)      10-11-24 @ 07:01  -  10-12-24 @ 07:00  --------------------------------------------------------  IN: 4280 mL / OUT: 4575 mL / NET: -295 mL    10-12-24 @ 07:01  -  10-12-24 @ 12:56  --------------------------------------------------------  IN: 0 mL / OUT: 150 mL / NET: -150 mL        Mode: PS (Pressure Support)/ Spontaneous, TV (machine): 71, FiO2: 40, PEEP: 5, PS: 5, MAP: 7, PIP: 10    acetaminophen   Oral Liquid .. 650 milliGRAM(s) Oral every 6 hours PRN  acetylcysteine 10%  Inhalation 4 milliLiter(s) Inhalation every 12 hours  albuterol/ipratropium for Nebulization 3 milliLiter(s) Nebulizer every 12 hours  amLODIPine   Tablet 10 milliGRAM(s) Oral daily  calcium acetate 667 milliGRAM(s) Oral three times a day with meals  ceFAZolin   IVPB 1000 milliGRAM(s) IV Intermittent every 12 hours  chlorhexidine 0.12% Liquid 15 milliLiter(s) Oral Mucosa every 12 hours  chlorhexidine 2% Cloths 1 Application(s) Topical <User Schedule>  heparin   Injectable 5000 Unit(s) SubCutaneous every 8 hours  hydrALAZINE 50 milliGRAM(s) Oral every 8 hours  insulin lispro (ADMELOG) corrective regimen sliding scale   SubCutaneous every 6 hours  labetalol 300 milliGRAM(s) Enteral Tube every 8 hours  lactated ringers. 1000 milliLiter(s) (100 mL/Hr) IV Continuous <Continuous>  pantoprazole  Injectable 40 milliGRAM(s) IV Push daily  petrolatum Ophthalmic Ointment 1 Application(s) Both EYES two times a day  simethicone 80 milliGRAM(s) Chew every 6 hours  tamsulosin 0.4 milliGRAM(s) Oral at bedtime      LABS:  Na: 139 (10-12 @ 06:47), 141 (10-11 @ 05:30), 143 (10-10 @ 05:12)  K: 4.2 (10-12 @ 06:47), 4.2 (10-11 @ 05:30), 4.1 (10-10 @ 05:12)  Cl: 105 (10-12 @ 06:47), 108 (10-11 @ 05:30), 110 (10-10 @ 05:12)  CO2: 22 (10-12 @ 06:47), 23 (10-11 @ 05:30), 23 (10-10 @ 05:12)  BUN: 39 (10-12 @ 06:47), 43 (10-11 @ 05:30), 54 (10-10 @ 05:12)  Cr: 3.54 (10-12 @ 06:47), 3.71 (10-11 @ 05:30), 4.57 (10-10 @ 05:12)  Glu: 104(10-12 @ 06:47), 100(10-11 @ 05:30), 96(10-10 @ 05:12)    Hgb: 10.8 (10-12 @ 06:47), 10.4 (10-11 @ 05:30), 9.8 (10-10 @ 05:12)  Hct: 33.9 (10-12 @ 06:47), 32.3 (10-11 @ 05:30), 31.6 (10-10 @ 05:12)  WBC: 7.29 (10-12 @ 06:47), 7.54 (10-11 @ 05:30), 7.44 (10-10 @ 05:12)  Plt: 250 (10-12 @ 06:47), 207 (10-11 @ 05:30), 190 (10-10 @ 05:12)

## 2024-10-12 NOTE — PROGRESS NOTE ADULT - SUBJECTIVE AND OBJECTIVE BOX
NEUROCRITICAL CARE PROGRESS NOTE    GARETT DELEON   MRN-[id number]  Summary:  /  HPI:  Unknown age male, unknown past medical history (reported stroke and MI by coworkers) presented to [facility] with AMS, Pt was working at [employer] when he was found down by coworkers. EMS called and pt brought to [facility] ED. Intubated, sedated, started on cardene for SBPs in 200s. CT head showed brain stem bleed. Transferred to NSICU for further management.  (30 Sep 2024 12:55)      S/Overnight events:  BD 12. GEORGE overnight. Neuro stable.     Hospital Course:   9/30: transferred from [facility]. A line placed. Versed dc'd. Roomate Prasanth at bedside, states pt has family in [place], cannot confirm medications or PMH other than stroke and MI. 250cc bolus 3% given. LR switched to NS. hydralazine 25q8 started, 3% started, switched propofol to precedex   10/1: stability CTH done. Added labetalol, started TF. Palliative consulted. ethics consulted to determine surrogate. febrile 103, pan cx sent  10/2: BD 2, GEORGE overnight. TF resumed. Desatt'd to 80s, FiO2 inc. to 50. Fentanyl given, ABG, CXR ordered. Maxxed on precedex, started on propofol for DARIEN -4 - -5. Precedex dc'd. Duonebs, mucomyst, hypertonic added. 3% dc'd. Cardene dc'd. Start vanc/CTX. Increased labetalol 200q8. MRSA negative, dc'd vanc. ETT pulled back 2cm x 2, good positioning after confirmatory chest xray. Ethics attempting to establish HCP with family. Na 159, starting FW 250q6 for range 150-155.   10/3: BD3, GEORGE o/n, neuro stable. Na elevating, FW increased to 300q6. Dc'd bowel reg for diarrhea. vEEG started. SQH 5000q8 tonight.   10/4: BD 4, albumn bolus, incr. LR to 80 2/2 incr. in Cr, LR to 100cc/hr for uptrending Cr. Started 7% hypersal for 48hrs and SL atropine for inline/oral thick secretions. Dc'd CTX and started ancef for MSSA in the sputum. Nephrology consulted for CKD, f/u recs. SBP 170s, given hydralazine 10mg IVP.   10/5: BD5, o/n 10mg IVP hydralazine given for SBP 170s and started on hydralazine 25q8 via OGT. 10mg IV push labetalol for SBP > 160s. RT placed for diarrhea.   10/6: BD6, o/n FW increased to 350q4 per nephrology recs. IV tylenol for temp 100.6, SBp 160s presumed uncomfortable.   10/7: BD7, overnight pancultured for temp 101.8F.   10/8: BD8. GEORGE. Cr bumped. decreased LR to 75cc/hr. Adding simethicone ATC. incr hydralazine 50mgTID. Incr labetalol 300mgTID. Na 145, decreased FWF to 250q6. Start precedex. FENa consistent with intrinsic kidney injury. Pend repeat renal US. Retaining up to 1.3L, bladder scans q6, straight cath PRN  10/9: BD 9. GEORGE overnight. Neuro stable. abd xray for distention w non-specific gas pattern, OGT to LIWS for morning. duonebs/mucomyst to q8 for improving secretions. Changed tube feeds to Jevity 1.5 20cc/hr, low rate due to abdominal distention, nepro dense and more difficult to digest. Tolerating CPAP, confirmed by ABG.   10/10: BD 10. GEORGE overnight. Neuro stable. (+) gabriel for urinary retention on bladder scan. inc TF to goal rate of 40cc/hr. family leaning toward pursuing trach/PEG. 1/2 amp for FS 81.   10/11: BD 11. GEORGE overnight. Neuro stable. Trach/PEG consults placed.   10/12: BD 12. GEORGE overnight. Neuro stable.     Vital Signs Last 24 Hrs  T(C): 38.4 (11 Oct 2024 22:50), Max: 38.4 (11 Oct 2024 22:50)  T(F): 101.1 (11 Oct 2024 22:50), Max: 101.1 (11 Oct 2024 22:50)  HR: 90 (11 Oct 2024 22:00) (72 - 90)  BP: --  BP(mean): --  RR: 12 (11 Oct 2024 22:00) (12 - 19)  SpO2: 97% (11 Oct 2024 22:00) (96% - 100%)    Parameters below as of 11 Oct 2024 23:00  Patient On (Oxygen Delivery Method): ventilator    O2 Concentration (%): 40    Mode: CPAP with PS, FiO2: 40, PEEP: 5, PS: 5, MAP: 6.2, PIP: 10    I&O's Detail    10 Oct 2024 07:01  -  11 Oct 2024 07:00  --------------------------------------------------------  IN:    Jevity 1.5: 320 mL    Lactated Ringers: 1200 mL  Total IN: 1520 mL    OUT:    Indwelling Catheter - Urethral (mL): 4295 mL  Total OUT: 4295 mL    Total NET: -2775 mL      11 Oct 2024 07:01  -  12 Oct 2024 00:18  --------------------------------------------------------  IN:    Free Water: 750 mL    Jevity 1.5: 640 mL    Lactated Ringers: 1600 mL  Total IN: 2990 mL    OUT:    Indwelling Catheter - Urethral (mL): 3200 mL  Total OUT: 3200 mL    Total NET: -210 mL    PHYSICAL EXAM:  Constitutional: Patient is resting comfortably in stretcher, in no acute distress.  Respiratory: +ETT, breathing non-labored, symmetrical chest wall movement  Cardiovascuar: RRR, no murmurs  Gastrointestinal: +OGT, abdomen soft, non tender  Genitourinary: exam deffered  Neurological:  Not opening eyes. Not oriented. Not following commands.  +cough/gag, +weak L corneal reflex, R corneal reflex absent  Motor: b/l UE extensor posture to noxious, b/l LE withdraw to noxious        LABS:                        10.4   7.54  )-----------( 207      ( 11 Oct 2024 05:30 )             32.3     10-11    141  |  108  |  43[H]  ----------------------------<  100[H]  4.2   |  23  |  3.71[H]    Ca    8.1[L]      11 Oct 2024 05:30  Phos  4.0     10-11  Mg     1.9     10-11        Urinalysis Basic - ( 11 Oct 2024 05:30 )    Color: x / Appearance: x / SG: x / pH: x  Gluc: 100 mg/dL / Ketone: x  / Bili: x / Urobili: x   Blood: x / Protein: x / Nitrite: x   Leuk Esterase: x / RBC: x / WBC x   Sq Epi: x / Non Sq Epi: x / Bacteria: x          CAPILLARY BLOOD GLUCOSE      POCT Blood Glucose.: 97 mg/dL (11 Oct 2024 11:30)  POCT Blood Glucose.: 92 mg/dL (11 Oct 2024 05:46)      Drug Levels: [] N/A    CSF Analysis: [] N/A      Allergies    Allergy Status Unknown    Intolerances      MEDICATIONS:  Antibiotics:  ceFAZolin   IVPB 1000 milliGRAM(s) IV Intermittent every 12 hours    Neuro:  acetaminophen   Oral Liquid .. 650 milliGRAM(s) Oral every 6 hours PRN  fentaNYL    Injectable 25 MICROGram(s) IV Push every 2 hours PRN    Anticoagulation:  heparin   Injectable 5000 Unit(s) SubCutaneous every 8 hours    OTHER:  acetylcysteine 10%  Inhalation 4 milliLiter(s) Inhalation every 12 hours  albuterol/ipratropium for Nebulization 3 milliLiter(s) Nebulizer every 12 hours  amLODIPine   Tablet 10 milliGRAM(s) Oral daily  chlorhexidine 0.12% Liquid 15 milliLiter(s) Oral Mucosa every 12 hours  chlorhexidine 2% Cloths 1 Application(s) Topical <User Schedule>  hydrALAZINE 50 milliGRAM(s) Oral every 8 hours  insulin lispro (ADMELOG) corrective regimen sliding scale   SubCutaneous every 6 hours  labetalol 300 milliGRAM(s) Enteral Tube every 8 hours  pantoprazole  Injectable 40 milliGRAM(s) IV Push daily  petrolatum Ophthalmic Ointment 1 Application(s) Both EYES two times a day  simethicone 80 milliGRAM(s) Chew every 6 hours  tamsulosin 0.4 milliGRAM(s) Oral at bedtime    IVF:  calcium acetate 667 milliGRAM(s) Oral three times a day with meals  lactated ringers. 1000 milliLiter(s) IV Continuous <Continuous>    CULTURES:  Culture Results:   Commensal iram consistent with body site (10-07 @ 04:17)  Culture Results:   No growth at 4 days (10-07 @ 03:30)      ASSESSMENT:  43 y/o PMH ?stroke/MI present to [facility] after collapsing at work. Decorticate posturing, vomiting, intubated for airway protection. 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 Transferred to [facility] for [facility] management.     Neuro:  - Neuro q4/vitals q1  - pain control: fentanyl 25q2 prn, tylenol  - CTH 9/30: enlarged pontine hemorrhage, CTH 10/3 done read stable   - vEEG (10/3-4)- negative  - stroke core measures, stroke neuro following  - MRI brain w/ w/o contrast pending    CV:  - SBP<160  - HTN: norvasc 10mg, labetalol 300q8, hydralazine 50q8  - echo (9/30) EF 75%    Resp:  - Intubated, CPAP trials as tolerated  - duonebs/mucomyst q12    GI:  - TF via OGT @ 40cc/hr, nepro w/ banatrol    - bowel reg held for diarrhea, RT (10/5-10/11)   - protonix while intubated  - standing simethicone for abdominal distension    Renal:  - LR@100cc/hr for uptending BUN/Cr  - nephro following, recs appreciated: phoslo TID  - (+) gabriel   - hypernatremia:  q6hrs  - Flomax 0.4mg at bedtime started for urinary retention   - trend BUN/Cr  - renal US 10/1: echogenicity c/w chronic med renal dz, repeat done 10/8: inc renal echogeicity, c/f medical renal disease w increased hydro     Endo:  - ISS, a1c 5.4    Heme:  - DVT ppx: SCDs, SQH 5000q8    ID:  - febrile, last pan cx 10/7  - MRSA swab negative, sputum MSSA + , s/p 1 dose vanc (10/2), CTX (10/2 - 10/4), Ancef (10/4-10/14)     Dispo: ADITHYA, full code, pending PT/OT    D/w Dr. D'Amico, Dr. Llanes

## 2024-10-12 NOTE — PROGRESS NOTE ADULT - ASSESSMENT
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    NEURO:  ICH score 3   -neuro check q4  -on going GOC  -PT/OT evaluation if signs of responsiveness   -MRI brain r/o cavernoma; possible DSA    PULMONARY:  Currently requiring mechanical ventilation for air-way protection; CPAP trials as tolerated  GOC discussions: family would like to pursuit trach - ENT consulted     CARDIOVASCULAR:  monitor on telemetry   vitals q1  sbp goal 100-160  -c/w amlodipine 10mg, labetalol 300mg TID, hydralazine 50mg TID   TTE w/ hypertensive changes     GASTROENTEROLOGY:  c/w tube feeds, PEG consult pending   GI ppx : Protonix 40mg qd  loose stools, c/w rectal tube; d/c bowel regimen     RENAL/:  ANIKA on likely CKD ; downtrending Cr - resolving post   -c/w phoslo; avoid nephrotoxic medications   -check BMP qd  -strict i/o's ; d/c Vuong - initiate TOV; bladder scan q4   -Na goal 135-145; c/w free water 250cc q6; decrease IVF to 80cc/hr    ENDOCRINE:  A1c- 5.3    HEME/ONC:  DVT ppx: SQH  b/l SCDs    INFECTIOUS:   Febrile overnight - repeat RVP, blood culture   ancef for MSSA+ sputum; end date 10/12(total 10 days)    Code statues: full code, ongoing GOC w/ palliative

## 2024-10-12 NOTE — PROGRESS NOTE ADULT - SUBJECTIVE AND OBJECTIVE BOX
NEUROCRITICAL CARE EVENING NOTE    DAY EVENTS:    - ongoing goc, mri today pending read      VITALS/IMAGING/DATA  - Reviewed    ALLERGIES:   - Reviewed      MEDICATIONS:  - Reviewed    Physical Exam:  Unchanged from day time, please refer to note.

## 2024-10-12 NOTE — PROGRESS NOTE ADULT - ASSESSMENT
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 CPAP as tolerated  ABG, CXR  VAP bundle  ABG  trach/peg pending next week    CV:  SBP goal 100-160  labetalol 200mg tid  amlodipine 10  Katt      RENAL:   ANIKA though suspect CKD  Fluids: LR at 80cc/hr  Vuong catheter in place; monitor Is/Os  Na goal 140-150  BMPq12  Nephrology following        GI:  Diet: TF  GI prophylaxis [] not indicated [x] PPI [] other:  Bowel regimen [] colace [x] senna [] other: miralax  Monitor LFTs    ENDO:   Goal euglycemia (-180)      HEME/ONC:  VTE prophylaxis: [x] SCDs [x] chemoprophylaxis       ID:   Mild leukocytosis  f/u pan culture  Ancef (10/2 - ) x7d  MRSA swab neg  f/u pan culture

## 2024-10-13 LAB
ANION GAP SERPL CALC-SCNC: 11 MMOL/L — SIGNIFICANT CHANGE UP (ref 5–17)
APTT BLD: 30.9 SEC — SIGNIFICANT CHANGE UP (ref 24.5–35.6)
BASOPHILS # BLD AUTO: 0.05 K/UL — SIGNIFICANT CHANGE UP (ref 0–0.2)
BASOPHILS NFR BLD AUTO: 0.6 % — SIGNIFICANT CHANGE UP (ref 0–2)
BUN SERPL-MCNC: 36 MG/DL — HIGH (ref 7–23)
CALCIUM SERPL-MCNC: 8.5 MG/DL — SIGNIFICANT CHANGE UP (ref 8.4–10.5)
CHLORIDE SERPL-SCNC: 103 MMOL/L — SIGNIFICANT CHANGE UP (ref 96–108)
CO2 SERPL-SCNC: 21 MMOL/L — LOW (ref 22–31)
CREAT SERPL-MCNC: 3.05 MG/DL — HIGH (ref 0.5–1.3)
EGFR: 25 ML/MIN/1.73M2 — LOW
EGFR: 25 ML/MIN/1.73M2 — LOW
EOSINOPHIL # BLD AUTO: 0.5 K/UL — SIGNIFICANT CHANGE UP (ref 0–0.5)
EOSINOPHIL NFR BLD AUTO: 6.3 % — HIGH (ref 0–6)
GLUCOSE BLDC GLUCOMTR-MCNC: 100 MG/DL — HIGH (ref 70–99)
GLUCOSE BLDC GLUCOMTR-MCNC: 131 MG/DL — HIGH (ref 70–99)
GLUCOSE BLDC GLUCOMTR-MCNC: 97 MG/DL — SIGNIFICANT CHANGE UP (ref 70–99)
GLUCOSE BLDC GLUCOMTR-MCNC: 97 MG/DL — SIGNIFICANT CHANGE UP (ref 70–99)
GLUCOSE SERPL-MCNC: 96 MG/DL — SIGNIFICANT CHANGE UP (ref 70–99)
HCT VFR BLD CALC: 32.7 % — LOW (ref 39–50)
HGB BLD-MCNC: 10.3 G/DL — LOW (ref 13–17)
IMM GRANULOCYTES NFR BLD AUTO: 0.3 % — SIGNIFICANT CHANGE UP (ref 0–0.9)
INR BLD: 0.99 — SIGNIFICANT CHANGE UP (ref 0.85–1.16)
LYMPHOCYTES # BLD AUTO: 1.39 K/UL — SIGNIFICANT CHANGE UP (ref 1–3.3)
LYMPHOCYTES # BLD AUTO: 17.5 % — SIGNIFICANT CHANGE UP (ref 13–44)
MAGNESIUM SERPL-MCNC: 1.9 MG/DL — SIGNIFICANT CHANGE UP (ref 1.6–2.6)
MCHC RBC-ENTMCNC: 27.5 PG — SIGNIFICANT CHANGE UP (ref 27–34)
MCHC RBC-ENTMCNC: 31.5 GM/DL — LOW (ref 32–36)
MCV RBC AUTO: 87.4 FL — SIGNIFICANT CHANGE UP (ref 80–100)
MONOCYTES # BLD AUTO: 0.75 K/UL — SIGNIFICANT CHANGE UP (ref 0–0.9)
MONOCYTES NFR BLD AUTO: 9.4 % — SIGNIFICANT CHANGE UP (ref 2–14)
NEUTROPHILS # BLD AUTO: 5.23 K/UL — SIGNIFICANT CHANGE UP (ref 1.8–7.4)
NEUTROPHILS NFR BLD AUTO: 65.9 % — SIGNIFICANT CHANGE UP (ref 43–77)
NRBC # BLD: 0 /100 WBCS — SIGNIFICANT CHANGE UP (ref 0–0)
NRBC BLD-RTO: 0 /100 WBCS — SIGNIFICANT CHANGE UP (ref 0–0)
PHOSPHATE SERPL-MCNC: 4 MG/DL — SIGNIFICANT CHANGE UP (ref 2.5–4.5)
PLATELET # BLD AUTO: 287 K/UL — SIGNIFICANT CHANGE UP (ref 150–400)
POTASSIUM SERPL-MCNC: 4.5 MMOL/L — SIGNIFICANT CHANGE UP (ref 3.5–5.3)
POTASSIUM SERPL-SCNC: 4.5 MMOL/L — SIGNIFICANT CHANGE UP (ref 3.5–5.3)
PROCALCITONIN SERPL-MCNC: 0.13 NG/ML — HIGH (ref 0.02–0.1)
PROTHROM AB SERPL-ACNC: 11.4 SEC — SIGNIFICANT CHANGE UP (ref 9.9–13.4)
RBC # BLD: 3.74 M/UL — LOW (ref 4.2–5.8)
RBC # FLD: 11.9 % — SIGNIFICANT CHANGE UP (ref 10.3–14.5)
SODIUM SERPL-SCNC: 135 MMOL/L — SIGNIFICANT CHANGE UP (ref 135–145)
WBC # BLD: 7.94 K/UL — SIGNIFICANT CHANGE UP (ref 3.8–10.5)
WBC # FLD AUTO: 7.94 K/UL — SIGNIFICANT CHANGE UP (ref 3.8–10.5)

## 2024-10-13 PROCEDURE — 99291 CRITICAL CARE FIRST HOUR: CPT

## 2024-10-13 PROCEDURE — 71045 X-RAY EXAM CHEST 1 VIEW: CPT | Mod: 26

## 2024-10-13 PROCEDURE — 99232 SBSQ HOSP IP/OBS MODERATE 35: CPT

## 2024-10-13 RX ORDER — HEPARIN SODIUM 1000 [USP'U]/ML
5000 INJECTION INTRAVENOUS; SUBCUTANEOUS EVERY 8 HOURS
Refills: 0 | Status: COMPLETED | OUTPATIENT
Start: 2024-10-13 | End: 2024-10-13

## 2024-10-13 RX ORDER — HEPARIN SODIUM 1000 [USP'U]/ML
5000 INJECTION INTRAVENOUS; SUBCUTANEOUS EVERY 8 HOURS
Refills: 0 | Status: DISCONTINUED | OUTPATIENT
Start: 2024-10-13 | End: 2024-10-13

## 2024-10-13 RX ORDER — SODIUM CHLORIDE 9 G/1000ML
1000 INJECTION, SOLUTION INTRAVENOUS
Refills: 0 | Status: DISCONTINUED | OUTPATIENT
Start: 2024-10-13 | End: 2024-10-14

## 2024-10-13 RX ORDER — SODIUM CHLORIDE 9 G/1000ML
1000 INJECTION, SOLUTION INTRAVENOUS
Refills: 0 | Status: DISCONTINUED | OUTPATIENT
Start: 2024-10-13 | End: 2024-10-13

## 2024-10-13 RX ORDER — TAMSULOSIN HYDROCHLORIDE 0.4 MG/1
0.8 CAPSULE ORAL AT BEDTIME
Refills: 0 | Status: DISCONTINUED | OUTPATIENT
Start: 2024-10-13 | End: 2024-10-21

## 2024-10-13 RX ADMIN — Medication 40 MILLIGRAM(S): at 11:12

## 2024-10-13 RX ADMIN — Medication 667 MILLIGRAM(S): at 11:32

## 2024-10-13 RX ADMIN — Medication 15 MILLILITER(S): at 06:14

## 2024-10-13 RX ADMIN — Medication 1 APPLICATION(S): at 18:57

## 2024-10-13 RX ADMIN — Medication 1 APPLICATION(S): at 06:24

## 2024-10-13 RX ADMIN — ACETYLCYSTEINE 4 MILLILITER(S): 200 INHALANT RESPIRATORY (INHALATION) at 06:03

## 2024-10-13 RX ADMIN — IPRATROPIUM BROMIDE AND ALBUTEROL SULFATE 3 MILLILITER(S): .5; 2.5 SOLUTION RESPIRATORY (INHALATION) at 18:00

## 2024-10-13 RX ADMIN — Medication 50 MILLIGRAM(S): at 14:18

## 2024-10-13 RX ADMIN — Medication 50 MILLIGRAM(S): at 06:13

## 2024-10-13 RX ADMIN — ACETYLCYSTEINE 4 MILLILITER(S): 200 INHALANT RESPIRATORY (INHALATION) at 18:00

## 2024-10-13 RX ADMIN — Medication 667 MILLIGRAM(S): at 17:27

## 2024-10-13 RX ADMIN — Medication 667 MILLIGRAM(S): at 07:54

## 2024-10-13 RX ADMIN — Medication 100 MILLIGRAM(S): at 11:24

## 2024-10-13 RX ADMIN — Medication 80 MILLIGRAM(S): at 17:27

## 2024-10-13 RX ADMIN — LABETALOL HYDROCHLORIDE 300 MILLIGRAM(S): 200 TABLET, FILM COATED ORAL at 06:14

## 2024-10-13 RX ADMIN — LABETALOL HYDROCHLORIDE 300 MILLIGRAM(S): 200 TABLET, FILM COATED ORAL at 21:03

## 2024-10-13 RX ADMIN — TAMSULOSIN HYDROCHLORIDE 0.8 MILLIGRAM(S): 0.4 CAPSULE ORAL at 21:03

## 2024-10-13 RX ADMIN — HEPARIN SODIUM 5000 UNIT(S): 1000 INJECTION INTRAVENOUS; SUBCUTANEOUS at 06:14

## 2024-10-13 RX ADMIN — Medication 15 MILLILITER(S): at 17:27

## 2024-10-13 RX ADMIN — HEPARIN SODIUM 5000 UNIT(S): 1000 INJECTION INTRAVENOUS; SUBCUTANEOUS at 21:02

## 2024-10-13 RX ADMIN — Medication 80 MILLIGRAM(S): at 11:13

## 2024-10-13 RX ADMIN — IPRATROPIUM BROMIDE AND ALBUTEROL SULFATE 3 MILLILITER(S): .5; 2.5 SOLUTION RESPIRATORY (INHALATION) at 06:03

## 2024-10-13 RX ADMIN — LABETALOL HYDROCHLORIDE 300 MILLIGRAM(S): 200 TABLET, FILM COATED ORAL at 14:18

## 2024-10-13 RX ADMIN — HEPARIN SODIUM 5000 UNIT(S): 1000 INJECTION INTRAVENOUS; SUBCUTANEOUS at 14:18

## 2024-10-13 RX ADMIN — Medication 50 MILLIGRAM(S): at 21:02

## 2024-10-13 RX ADMIN — Medication 1 APPLICATION(S): at 06:14

## 2024-10-13 RX ADMIN — Medication 80 MILLIGRAM(S): at 06:13

## 2024-10-13 RX ADMIN — Medication 100 MILLIGRAM(S): at 21:02

## 2024-10-13 RX ADMIN — SODIUM CHLORIDE 40 MILLILITER(S): 9 INJECTION, SOLUTION INTRAVENOUS at 20:41

## 2024-10-13 RX ADMIN — AMLODIPINE BESYLATE 10 MILLIGRAM(S): 10 TABLET ORAL at 06:13

## 2024-10-13 NOTE — PROGRESS NOTE ADULT - SUBJECTIVE AND OBJECTIVE BOX
NEUROCRITICAL CARE EVENING NOTE    DAY EVENTS:    - ongoing goc, tolerated cpap briefly      VITALS/IMAGING/DATA  - Reviewed    ALLERGIES:   - Reviewed      MEDICATIONS:  - Reviewed    Physical Exam:  Unchanged from day time, please refer to note.

## 2024-10-13 NOTE — PROGRESS NOTE ADULT - ASSESSMENT
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    NEURO:  ICH score 3   -neuro check q4  -on going GOC  -PT/OT evaluation if signs of responsiveness   -MRI brain r/o cavernoma; possible DSA    PULMONARY:  Currently requiring mechanical ventilation for air-way protection; CPAP trials as tolerated  GOC discussions: family would like to pursuit trach - ENT consulted     CARDIOVASCULAR:  monitor on telemetry   vitals q1  sbp goal 100-160  -c/w amlodipine 10mg, labetalol 300mg TID, hydralazine 50mg TID   TTE w/ hypertensive changes     GASTROENTEROLOGY:  c/w tube feeds, PEG consult pending   GI ppx : Protonix 40mg qd  loose stools, c/w rectal tube; d/c bowel regimen     RENAL/:  ANIKA on likely CKD ; downtrending Cr - resolving post   -c/w phoslo; avoid nephrotoxic medications   -check BMP qd  -strict i/o's ; d/c Vuong - initiate TOV; bladder scan q4   -Na goal 135-145; c/w free water 250cc q6; decrease IVF to 80cc/hr    ENDOCRINE:  A1c- 5.3    HEME/ONC:  DVT ppx: SQH  b/l SCDs    INFECTIOUS:   Febrile overnight - repeat RVP, blood culture   ancef for MSSA+ sputum; end date 10/12(total 10 days)    Code statues: full code, ongoing GOC w/ palliative  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    NEURO:  ICH score 3   -neuro check q4  -PT/OT evaluation if signs of responsiveness   -MRI brain negative for cavernoma; Cerebeller infarct     PULMONARY:  Currently requiring mechanical ventilation for air-way protection; CPAP trials as tolerated  GOC discussions: family would like to pursuit trach - ENT consulted     CARDIOVASCULAR:  monitor on telemetry   vitals q1  sbp goal 100-160  -c/w amlodipine 10mg, labetalol 300mg TID, hydralazine 50mg TID   TTE w/ hypertensive changes     GASTROENTEROLOGY:  c/w tube feeds hold at midnight for Trach, PEG consult pending   GI ppx : Protonix 40mg qd  rectal tube removed 10/12     RENAL/:  ANIKA on likely CKD ; downtrending Cr - resolving post   -c/w phoslo; avoid nephrotoxic medications   -check BMP qd  -strict i/o's ;c/w initiate TOV; bladder scan q4 - requiring straight cath, increase doxazosin to 0.8mg   -Na goal 135-145; c/w free water 250cc q6; d/c IVF     ENDOCRINE:  A1c- 5.3    HEME/ONC:  DVT ppx: SQH ; hold AM dose of heparin SQ 10/14 for trach placement   b/l SCDs    INFECTIOUS:   Ancef to continue for MSSA PNA - end date 10/14    Code statues: full code, ongoing GOC w/ palliative

## 2024-10-13 NOTE — PROGRESS NOTE ADULT - ASSESSMENT
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 CPAP as tolerated  ABG, CXR  VAP bundle  ABG  trach/peg pending next week    CV:  SBP goal 100-160  labetalol 200mg tid  amlodipine 10  Katt      RENAL:   ANIKA though suspect CKD  Fluids: LR at 80cc/hr  Vuong catheter in place; monitor Is/Os  Na goal 140-150  BMPq12  Nephrology following        GI:  Diet: TF  GI prophylaxis [] not indicated [x] PPI [] other:  Bowel regimen [] colace [x] senna [] other: miralax  Monitor LFTs    ENDO:   Goal euglycemia (-180)      HEME/ONC:  VTE prophylaxis: [x] SCDs [x] chemoprophylaxis       ID:   Mild leukocytosis  f/u pan culture  Ancef (10/2 - ) x7d  MRSA swab neg  f/u pan culture   ASSESSMENT/PLAN:  71 y/o M with 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 CPAP as tolerated  ABG, CXR  VAP bundle  ABG  trach/peg pending next week    CV:  SBP goal 100-160  labetalol 200mg tid  amlodipine 10  Katt      RENAL:   ANIKA though suspect CKD  Fluids: LR at 80cc/hr  Vuong catheter in place; monitor Is/Os  Na goal 140-150  BMPq12  Nephrology following        GI:  Diet: TF, NPO mn for trach  GI prophylaxis [] not indicated [x] PPI [] other:  Bowel regimen [] colace [x] senna [] other: miralax  Monitor LFTs    ENDO:   Goal euglycemia (-180)      HEME/ONC:  VTE prophylaxis: [x] SCDs [x] chemoprophylaxis       ID:   Mild leukocytosis  f/u pan culture  Ancef (10/2 - ) x7d  MRSA swab neg  f/u pan culture

## 2024-10-13 NOTE — PROVIDER CONTACT NOTE (MEDICATION) - ACTION/TREATMENT ORDERED:
Provider did not add PRN hydralazine but was told to hold off untill sustained above 170 systolic. Will continue to monitor.

## 2024-10-13 NOTE — PROGRESS NOTE ADULT - SUBJECTIVE AND OBJECTIVE BOX
INTERVAL HISTORY: HPI:  Unknown age male, unknown past medical history (reported stroke and MI by coworkers) presented to [facility] with AMS, Pt was working at [employer] when he was found down by coworkers. EMS called and pt brought to [facility] ED. Intubated, sedated, started on cardene for SBPs in 200s. CT head showed brain stem bleed. Transferred to NSICU for further management.  (30 Sep 2024 12:55)    REVIEW OF SYSTEMS: [ ] Unable to Assess due to neurologic exam   [ ] All ROS addressed below are non-contributory, except:  Neuro: [ ] Headache [ ] Back pain [ ] Numbness [ ] Weakness [ ] Ataxia [ ] Dizziness [ ] Aphasia [ ] Dysarthria [ ] Visual disturbance  Resp: [ ] Shortness of breath/dyspnea, [ ] Orthopnea [ ] Cough  CV: [ ] Chest pain [ ] Palpitation [ ] Lightheadedness [ ] Syncope  Renal: [ ] Thirst [ ] Edema  GI: [ ] Nausea [ ] Emesis [ ] Abdominal pain [ ] Constipation [ ] Diarrhea  Hem: [ ] Hematemesis [ ] bright red blood per rectum  ID: [ ] Fever [ ] Chills [ ] Dysuria  ENT: [ ] Rhinorrhea    PHYSICAL EXAM:    General: intubated   Neurological: dysconjugate gaze, ? following with eyes(at times looks down to command),  absent R corneal reflex, L is still present, does not doll to movement of head, strong cough, able to maintain spontaneous breaths on CPAP, RUE extension, LUE minimal movement to nox, b/l LE WD to nox,  Pulmonary: Clear to Auscultation, No Rales, No Rhonchi, No Wheezes   Cardiovascular: S1, S2, Regular Rate and Rhythm   Gastrointestinal: Soft, Nontender, Nondistended   Extremities: No LE edema

## 2024-10-13 NOTE — PROVIDER CONTACT NOTE (MEDICATION) - SITUATION
Patient has order to keep systolic blood pressure below 160. Patient was hovering around 160 systolic but there isn't any PRN blood pressure.

## 2024-10-13 NOTE — PROGRESS NOTE ADULT - SUBJECTIVE AND OBJECTIVE BOX
HPI:  Unknown age male, unknown past medical history (reported stroke and MI by coworkers) presented to [facility] with AMS, Pt was working at [employer] when he was found down by coworkers. EMS called and pt brought to [facility] ED. Intubated, sedated, started on cardene for SBPs in 200s. CT head showed brain stem bleed. Transferred to NSICU for further management.  (30 Sep 2024 12:55)    INTERVAL EVENTS:  GEORGE    HOSPITAL COURSE:  9/30: transferred from [facility]. A line placed. Versed dc'd. Cy Rader at bedside, states pt has family in [place], cannot confirm medications or PMH other than stroke and MI. 250cc bolus 3% given. LR switched to NS. hydralazine 25q8 started, 3% started, switched propofol to precedex   10/1: stability CTH done. Added labetalol, started TF. Palliative consulted. ethics consulted to determine surrogate. febrile 103, pan cx sent  10/2: BD 2, GEORGE overnight. TF resumed. Desatt'd to 80s, FiO2 inc. to 50. Fentanyl given, ABG, CXR ordered. Maxxed on precedex, started on propofol for DARIEN -4 - -5. Precedex dc'd. Duonebs, mucomyst, hypertonic added. 3% dc'd. Cardene dc'd. Start vanc/CTX. Increased labetalol 200q8. MRSA negative, dc'd vanc. ETT pulled back 2cm x 2, good positioning after confirmatory chest xray. Ethics attempting to establish HCP with family. Na 159, starting FW 250q6 for range 150-155.   10/3: BD3, GEORGE o/n, neuro stable. Na elevating, FW increased to 300q6. Dc'd bowel reg for diarrhea. vEEG started. SQH 5000q8 tonight.   10/4: BD 4, albumn bolus, incr. LR to 80 2/2 incr. in Cr, LR to 100cc/hr for uptrending Cr. Started 7% hypersal for 48hrs and SL atropine for inline/oral thick secretions. Dc'd CTX and started ancef for MSSA in the sputum. Nephrology consulted for CKD, f/u recs. SBP 170s, given hydralazine 10mg IVP.   10/5: BD5, o/n 10mg IVP hydralazine given for SBP 170s and started on hydralazine 25q8 via OGT. 10mg IV push labetalol for SBP > 160s. RT placed for diarrhea.   10/6: BD6, o/n FW increased to 350q4 per nephrology recs. IV tylenol for temp 100.6, SBp 160s presumed uncomfortable.   10/7: BD7, overnight pancultured for temp 101.8F.   10/8: BD8. GEORGE. Cr bumped. decreased LR to 75cc/hr. Adding simethicone ATC. incr hydralazine 50mgTID. Incr labetalol 300mgTID. Na 145, decreased FWF to 250q6. Start precedex. FENa consistent with intrinsic kidney injury. Pend repeat renal US. Retaining up to 1.3L, bladder scans q6, straight cath PRN  10/9: BD 9. GEORGE overnight. Neuro stable. abd xray for distention w non-specific gas pattern, OGT to LIWS for morning. duonebs/mucomyst to q8 for improving secretions. Changed tube feeds to Jevity 1.5 20cc/hr, low rate due to abdominal distention, nepro dense and more difficult to digest. Tolerating CPAP, confirmed by ABG.   10/10: BD 10. GEORGE overnight. Neuro stable. (+) gabriel for urinary retention on bladder scan. inc TF to goal rate of 40cc/hr. family leaning toward pursuing trach/PEG. 1/2 amp for FS 81.   10/11: BD 11. GEORGE overnight. Neuro stable. Trach/PEG consults placed.   10/12: BD 12. GEORGE overnight. Neuro stable. MRI brain complete.   10/13: BD 13.      Vital Signs Last 24 Hrs  T(C): 37 (12 Oct 2024 22:08), Max: 37.6 (12 Oct 2024 01:14)  T(F): 98.6 (12 Oct 2024 22:08), Max: 99.7 (12 Oct 2024 01:14)  HR: 79 (12 Oct 2024 20:40) (76 - 94)  BP: 152/104 (12 Oct 2024 19:00) (146/99 - 159/106)  BP(mean): 122 (12 Oct 2024 19:00) (117 - 128)  RR: 20 (12 Oct 2024 20:40) (9 - 20)  SpO2: 99% (12 Oct 2024 20:40) (93% - 100%)    Parameters below as of 12 Oct 2024 20:40  Patient On (Oxygen Delivery Method): ventilator    O2 Concentration (%): 40    I&O's Summary    11 Oct 2024 07:01  -  12 Oct 2024 07:00  --------------------------------------------------------  IN: 4280 mL / OUT: 4575 mL / NET: -295 mL    12 Oct 2024 07:01  -  13 Oct 2024 00:03  --------------------------------------------------------  IN: 1200 mL / OUT: 1300 mL / NET: -100 mL        PHYSICAL EXAM:  General: NAD, off sedation  HEENT: PERRL 3mm briskly reactive, face symmetric  Cardiovascular: RRR, normal S1 and S2   Respiratory: intubated, lungs CTAB, no wheezing, rhonchi, or crackles   GI: normoactive BS to auscultation, abd soft, NTND, +OGT   Neuro: Does not open eyes to noxious stim, does not follow commands. BUE extend to noxious stim, BLE triple flex to noxious stim. +left corneal, absent right corneal, +cough/gag  Extremities: distal pulses 2+ x4        LABS:                        10.8   7.29  )-----------( 250      ( 12 Oct 2024 06:47 )             33.9     10-12    139  |  105  |  39[H]  ----------------------------<  104[H]  4.2   |  22  |  3.54[H]    Ca    8.3[L]      12 Oct 2024 06:47  Phos  4.6     10-12  Mg     2.2     10-12        Urinalysis Basic - ( 12 Oct 2024 06:47 )    Color: x / Appearance: x / SG: x / pH: x  Gluc: 104 mg/dL / Ketone: x  / Bili: x / Urobili: x   Blood: x / Protein: x / Nitrite: x   Leuk Esterase: x / RBC: x / WBC x   Sq Epi: x / Non Sq Epi: x / Bacteria: x          CAPILLARY BLOOD GLUCOSE      POCT Blood Glucose.: 96 mg/dL (12 Oct 2024 23:19)  POCT Blood Glucose.: 102 mg/dL (12 Oct 2024 21:50)  POCT Blood Glucose.: 90 mg/dL (12 Oct 2024 18:31)  POCT Blood Glucose.: 91 mg/dL (12 Oct 2024 13:00)  POCT Blood Glucose.: 102 mg/dL (12 Oct 2024 07:04)  POCT Blood Glucose.: 101 mg/dL (12 Oct 2024 00:59)      Drug Levels: [] N/A    CSF Analysis: [] N/A      Allergies    Allergy Status Unknown    Intolerances      MEDICATIONS:  Antibiotics:  ceFAZolin   IVPB 1000 milliGRAM(s) IV Intermittent every 12 hours    Neuro:  acetaminophen   Oral Liquid .. 650 milliGRAM(s) Oral every 6 hours PRN    Anticoagulation:  heparin   Injectable 5000 Unit(s) SubCutaneous every 8 hours    OTHER:  acetylcysteine 10%  Inhalation 4 milliLiter(s) Inhalation every 12 hours  albuterol/ipratropium for Nebulization 3 milliLiter(s) Nebulizer every 12 hours  amLODIPine   Tablet 10 milliGRAM(s) Oral daily  chlorhexidine 0.12% Liquid 15 milliLiter(s) Oral Mucosa every 12 hours  chlorhexidine 2% Cloths 1 Application(s) Topical <User Schedule>  hydrALAZINE 50 milliGRAM(s) Oral every 8 hours  insulin lispro (ADMELOG) corrective regimen sliding scale   SubCutaneous every 6 hours  labetalol 300 milliGRAM(s) Enteral Tube every 8 hours  pantoprazole  Injectable 40 milliGRAM(s) IV Push daily  petrolatum Ophthalmic Ointment 1 Application(s) Both EYES two times a day  simethicone 80 milliGRAM(s) Chew every 6 hours  tamsulosin 0.4 milliGRAM(s) Oral at bedtime    IVF:  calcium acetate 667 milliGRAM(s) Oral three times a day with meals  lactated ringers. 1000 milliLiter(s) IV Continuous <Continuous>    CULTURES:  Culture Results:   Commensal iram consistent with body site (10-07 @ 04:17)  Culture Results:   No growth at 5 days (10-07 @ 03:30)    RADIOLOGY & ADDITIONAL TESTS:      ASSESSMENT:  45 y/o PMH ?stroke/MI present to [facility] after collapsing at work. Decorticate posturing, vomiting, intubated for airway protection. 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 Transferred to [facility] for further management.     Neuro:  - Neuro q4/vitals q1  - pain control: tylenol  - CTH 9/30: enlarged pontine hemorrhage, CTH 10/3 done read stable   - vEEG (10/3-4)- negative  - stroke core measures, stroke neuro following  - MRI brain w/ w/o contrast complete 10/12    CV:  - SBP <160  - HTN: amlodipine 10mg qd, labetalol 300mg q8h, hydralazine 50mg q8h  - echo (9/30) EF 75%    Resp:  - Intubated, CPAP 5/5 trials as tolerated  - duonebs/mucomyst q12    GI:  - TF via OGT @ 40cc/hr, nepro w/ banatrol    - bowel reg held for diarrhea, RT (10/5-10/12)   - Protonix while intubated  - standing simethicone for abdominal distension    Renal:  - LR @80cc/hr for uptending BUN/Cr, trend  - nephro following, recs appreciated: phoslo TID  - TOV  - hypernatremia:  q6hrs  - Urinary retenion: Flomax 0.4mg at bedtime   - renal US 10/1: echogenicity c/w chronic med renal dz, repeat done 10/8: inc renal echogeicity, c/f medical renal disease w increased hydro     Endo:  - ISS, A1c 5.4    Heme:  - DVT ppx: SCDs, SQH 5000q8    ID:  - febrile, last pan cx 10/12  - MRSA swab negative, sputum MSSA + , s/p 1 dose vanc (10/2), CTX (10/2 - 10/4), Ancef (10/4-10/14)     Dispo: NSICU, full code, pending PT/OT    D/w Dr. D'Amico, Dr. Llanes

## 2024-10-14 LAB
ANION GAP SERPL CALC-SCNC: 12 MMOL/L — SIGNIFICANT CHANGE UP (ref 5–17)
BASE EXCESS BLDA CALC-SCNC: -3.9 MMOL/L — LOW (ref -2–3)
BUN SERPL-MCNC: 38 MG/DL — HIGH (ref 7–23)
CALCIUM SERPL-MCNC: 8.7 MG/DL — SIGNIFICANT CHANGE UP (ref 8.4–10.5)
CHLORIDE SERPL-SCNC: 102 MMOL/L — SIGNIFICANT CHANGE UP (ref 96–108)
CO2 BLDA-SCNC: 23 MMOL/L — SIGNIFICANT CHANGE UP (ref 19–24)
CO2 SERPL-SCNC: 20 MMOL/L — LOW (ref 22–31)
CREAT SERPL-MCNC: 3.1 MG/DL — HIGH (ref 0.5–1.3)
EGFR: 24 ML/MIN/1.73M2 — LOW
EGFR: 24 ML/MIN/1.73M2 — LOW
GLUCOSE BLDC GLUCOMTR-MCNC: 101 MG/DL — HIGH (ref 70–99)
GLUCOSE BLDC GLUCOMTR-MCNC: 107 MG/DL — HIGH (ref 70–99)
GLUCOSE BLDC GLUCOMTR-MCNC: 98 MG/DL — SIGNIFICANT CHANGE UP (ref 70–99)
GLUCOSE BLDC GLUCOMTR-MCNC: 99 MG/DL — SIGNIFICANT CHANGE UP (ref 70–99)
GLUCOSE SERPL-MCNC: 111 MG/DL — HIGH (ref 70–99)
HCO3 BLDA-SCNC: 22 MMOL/L — SIGNIFICANT CHANGE UP (ref 21–28)
HCT VFR BLD CALC: 34.5 % — LOW (ref 39–50)
HGB BLD-MCNC: 11.3 G/DL — LOW (ref 13–17)
MAGNESIUM SERPL-MCNC: 1.8 MG/DL — SIGNIFICANT CHANGE UP (ref 1.6–2.6)
MCHC RBC-ENTMCNC: 29.2 PG — SIGNIFICANT CHANGE UP (ref 27–34)
MCHC RBC-ENTMCNC: 32.8 GM/DL — SIGNIFICANT CHANGE UP (ref 32–36)
MCV RBC AUTO: 89.1 FL — SIGNIFICANT CHANGE UP (ref 80–100)
NRBC # BLD: 0 /100 WBCS — SIGNIFICANT CHANGE UP (ref 0–0)
NRBC BLD-RTO: 0 /100 WBCS — SIGNIFICANT CHANGE UP (ref 0–0)
PCO2 BLDA: 40 MMHG — SIGNIFICANT CHANGE UP (ref 35–48)
PH BLDA: 7.34 — LOW (ref 7.35–7.45)
PHOSPHATE SERPL-MCNC: 4.2 MG/DL — SIGNIFICANT CHANGE UP (ref 2.5–4.5)
PLATELET # BLD AUTO: 318 K/UL — SIGNIFICANT CHANGE UP (ref 150–400)
PO2 BLDA: 83 MMHG — SIGNIFICANT CHANGE UP (ref 83–108)
POTASSIUM SERPL-MCNC: 4.4 MMOL/L — SIGNIFICANT CHANGE UP (ref 3.5–5.3)
POTASSIUM SERPL-SCNC: 4.4 MMOL/L — SIGNIFICANT CHANGE UP (ref 3.5–5.3)
RBC # BLD: 3.87 M/UL — LOW (ref 4.2–5.8)
RBC # FLD: 12.2 % — SIGNIFICANT CHANGE UP (ref 10.3–14.5)
SAO2 % BLDA: 97 % — SIGNIFICANT CHANGE UP (ref 94–98)
SODIUM SERPL-SCNC: 134 MMOL/L — LOW (ref 135–145)
WBC # BLD: 11.07 K/UL — HIGH (ref 3.8–10.5)
WBC # FLD AUTO: 11.07 K/UL — HIGH (ref 3.8–10.5)

## 2024-10-14 PROCEDURE — 99291 CRITICAL CARE FIRST HOUR: CPT

## 2024-10-14 PROCEDURE — 31645 BRNCHSC W/THER ASPIR 1ST: CPT | Mod: GC

## 2024-10-14 PROCEDURE — 99232 SBSQ HOSP IP/OBS MODERATE 35: CPT

## 2024-10-14 PROCEDURE — 99233 SBSQ HOSP IP/OBS HIGH 50: CPT | Mod: GC,25

## 2024-10-14 PROCEDURE — 31600 PLANNED TRACHEOSTOMY: CPT

## 2024-10-14 PROCEDURE — 71045 X-RAY EXAM CHEST 1 VIEW: CPT | Mod: 26,77

## 2024-10-14 PROCEDURE — 71045 X-RAY EXAM CHEST 1 VIEW: CPT | Mod: 26,76

## 2024-10-14 RX ORDER — MIDAZOLAM IN 0.9 % SOD.CHLORID 1 MG/ML
10 PLASTIC BAG, INJECTION (ML) INTRAVENOUS ONCE
Refills: 0 | Status: DISCONTINUED | OUTPATIENT
Start: 2024-10-14 | End: 2024-10-14

## 2024-10-14 RX ORDER — CISATRACURIUM BESYLATE 10 MG/5ML
20 INJECTION, SOLUTION INTRAVENOUS ONCE
Refills: 0 | Status: COMPLETED | OUTPATIENT
Start: 2024-10-14 | End: 2024-10-14

## 2024-10-14 RX ORDER — MAGNESIUM SULFATE 500 MG/ML
2 SYRINGE (ML) INJECTION ONCE
Refills: 0 | Status: COMPLETED | OUTPATIENT
Start: 2024-10-14 | End: 2024-10-14

## 2024-10-14 RX ORDER — FENTANYL CITRATE-0.9 % NACL/PF 100MCG/2ML
0.5 SYRINGE (ML) INTRAVENOUS
Qty: 2500 | Refills: 0 | Status: DISCONTINUED | OUTPATIENT
Start: 2024-10-14 | End: 2024-10-14

## 2024-10-14 RX ORDER — NOREPINEPHRINE BITARTRATE 8 MG
0.05 SOLUTION INTRAVENOUS
Qty: 8 | Refills: 0 | Status: DISCONTINUED | OUTPATIENT
Start: 2024-10-14 | End: 2024-10-14

## 2024-10-14 RX ORDER — LIDOCAINE HCL/EPINEPHRINE/PF 1 %-1:200K
20 AMPUL (ML) INJECTION ONCE
Refills: 0 | Status: COMPLETED | OUTPATIENT
Start: 2024-10-14 | End: 2024-10-14

## 2024-10-14 RX ORDER — PROPOFOL 10 MG/ML
10 INJECTION, EMULSION INTRAVENOUS
Qty: 1000 | Refills: 0 | Status: DISCONTINUED | OUTPATIENT
Start: 2024-10-14 | End: 2024-10-14

## 2024-10-14 RX ORDER — OXYCODONE HYDROCHLORIDE 30 MG/1
5 TABLET ORAL EVERY 4 HOURS
Refills: 0 | Status: DISCONTINUED | OUTPATIENT
Start: 2024-10-14 | End: 2024-10-21

## 2024-10-14 RX ORDER — SENNA 187 MG
2 TABLET ORAL AT BEDTIME
Refills: 0 | Status: DISCONTINUED | OUTPATIENT
Start: 2024-10-14 | End: 2024-10-21

## 2024-10-14 RX ADMIN — Medication 1 APPLICATION(S): at 05:32

## 2024-10-14 RX ADMIN — Medication 667 MILLIGRAM(S): at 12:19

## 2024-10-14 RX ADMIN — Medication 50 MILLIGRAM(S): at 05:06

## 2024-10-14 RX ADMIN — Medication 25 GRAM(S): at 07:01

## 2024-10-14 RX ADMIN — Medication 1 APPLICATION(S): at 06:45

## 2024-10-14 RX ADMIN — PROPOFOL 4.8 MICROGRAM(S)/KG/MIN: 10 INJECTION, EMULSION INTRAVENOUS at 16:05

## 2024-10-14 RX ADMIN — Medication 1 APPLICATION(S): at 18:57

## 2024-10-14 RX ADMIN — PROPOFOL 4.8 MICROGRAM(S)/KG/MIN: 10 INJECTION, EMULSION INTRAVENOUS at 05:04

## 2024-10-14 RX ADMIN — LABETALOL HYDROCHLORIDE 300 MILLIGRAM(S): 200 TABLET, FILM COATED ORAL at 12:19

## 2024-10-14 RX ADMIN — ACETYLCYSTEINE 4 MILLILITER(S): 200 INHALANT RESPIRATORY (INHALATION) at 17:15

## 2024-10-14 RX ADMIN — Medication 50 MILLIGRAM(S): at 22:24

## 2024-10-14 RX ADMIN — Medication 667 MILLIGRAM(S): at 18:32

## 2024-10-14 RX ADMIN — Medication 10 MILLIGRAM(S): at 15:15

## 2024-10-14 RX ADMIN — Medication 80 MILLIGRAM(S): at 05:06

## 2024-10-14 RX ADMIN — Medication 100 MILLIGRAM(S): at 12:19

## 2024-10-14 RX ADMIN — Medication 50 MILLIGRAM(S): at 12:19

## 2024-10-14 RX ADMIN — Medication 20 MILLILITER(S): at 15:05

## 2024-10-14 RX ADMIN — ACETYLCYSTEINE 4 MILLILITER(S): 200 INHALANT RESPIRATORY (INHALATION) at 03:30

## 2024-10-14 RX ADMIN — Medication 80 MILLIGRAM(S): at 18:32

## 2024-10-14 RX ADMIN — TAMSULOSIN HYDROCHLORIDE 0.8 MILLIGRAM(S): 0.4 CAPSULE ORAL at 22:23

## 2024-10-14 RX ADMIN — AMLODIPINE BESYLATE 10 MILLIGRAM(S): 10 TABLET ORAL at 05:06

## 2024-10-14 RX ADMIN — LABETALOL HYDROCHLORIDE 300 MILLIGRAM(S): 200 TABLET, FILM COATED ORAL at 05:06

## 2024-10-14 RX ADMIN — Medication 80 MILLIGRAM(S): at 12:18

## 2024-10-14 RX ADMIN — IPRATROPIUM BROMIDE AND ALBUTEROL SULFATE 3 MILLILITER(S): .5; 2.5 SOLUTION RESPIRATORY (INHALATION) at 17:15

## 2024-10-14 RX ADMIN — Medication 4 MICROGRAM(S)/KG/HR: at 11:59

## 2024-10-14 RX ADMIN — Medication 2 TABLET(S): at 22:23

## 2024-10-14 RX ADMIN — Medication 80 MILLIGRAM(S): at 00:03

## 2024-10-14 RX ADMIN — Medication 40 MILLIGRAM(S): at 12:18

## 2024-10-14 RX ADMIN — LABETALOL HYDROCHLORIDE 300 MILLIGRAM(S): 200 TABLET, FILM COATED ORAL at 22:23

## 2024-10-14 RX ADMIN — CISATRACURIUM BESYLATE 20 MILLIGRAM(S): 10 INJECTION, SOLUTION INTRAVENOUS at 12:20

## 2024-10-14 RX ADMIN — IPRATROPIUM BROMIDE AND ALBUTEROL SULFATE 3 MILLILITER(S): .5; 2.5 SOLUTION RESPIRATORY (INHALATION) at 03:31

## 2024-10-14 RX ADMIN — PROPOFOL 4.8 MICROGRAM(S)/KG/MIN: 10 INJECTION, EMULSION INTRAVENOUS at 11:09

## 2024-10-14 NOTE — PROGRESS NOTE ADULT - ASSESSMENT
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 Patient does not have a prior hx of abdominal surgeries, is not currently on any anticoagulation and is only on SQH. Patients son had a long discussion with palliative care regarding goals of care, is understanding the chance of full recovery is poor, patient's son (SDM) would like to proceed with any measures to prolong his life which includes PEG and trach.     PLAN:  Plan to place PEG next week on Wednesday 10/16 with Dr. Trinh   Undergoing Tracheostomy on 10/14 with Pulmonary  NPO MN before day of procedure  Get pre-op labs including T/S and Coags  Rest of the care as per primary team    General surgery team 4c will continue to follow

## 2024-10-14 NOTE — CHART NOTE - NSCHARTNOTEFT_GEN_A_CORE
Admitting Diagnosis:   Patient is a 44y old  Male who presents with a chief complaint of brain stem bleed (14 Oct 2024 11:53)  PAST MEDICAL & SURGICAL HISTORY:  Acute myocardial infarction  CVA (cerebral vascular accident)    Current Nutrition Order: NPO via orogastric tube: Jevity 1.5 with goal of 40mL/hour x 18 hours + 2 Banatrol per day, 2 LPS (liquid protein supplements) per day, providing 720mL total volume;     PO Intake: Good (%) [   ]  Fair (50-75%) [   ] Poor (<25%) [   ] *NPO with nasogastric tube*    GI Issues: no distention noted; BM on 10/12/24 noted; orogastric tube noted;     Pain: nonverbal indicators absent    Skin Integrity: intact, no pressure ulcers noted; pitting 2+ edema, generalized; Mookie: Yuliana      10-13-24 @ 07:01  -  10-14-24 @ 07:00  --------------------------------------------------------  IN: 2493.8 mL / OUT: 3860 mL / NET: -1366.2 mL    10-14-24 @ 07:01  -  10-14-24 @ 14:03  --------------------------------------------------------  IN: 436.4 mL / OUT: 890 mL / NET: -453.6 mL      Labs:   10-14    134[L]  |  102  |  38[H]  ----------------------------<  111[H]  4.4   |  20[L]  |  3.10[H]    Ca    8.7      14 Oct 2024 05:22  Phos  4.2     10-14  Mg     1.8     10-14    CAPILLARY BLOOD GLUCOSE    POCT Blood Glucose.: 98 mg/dL (14 Oct 2024 11:07)  POCT Blood Glucose.: 99 mg/dL (14 Oct 2024 05:17)  POCT Blood Glucose.: 131 mg/dL (13 Oct 2024 23:09)  POCT Blood Glucose.: 97 mg/dL (13 Oct 2024 17:19)    Medications:  MEDICATIONS  (STANDING):  acetylcysteine 10%  Inhalation 4 milliLiter(s) Inhalation every 12 hours  albuterol/ipratropium for Nebulization 3 milliLiter(s) Nebulizer every 12 hours  amLODIPine   Tablet 10 milliGRAM(s) Oral daily  calcium acetate 667 milliGRAM(s) Oral three times a day with meals  chlorhexidine 2% Cloths 1 Application(s) Topical <User Schedule>  dextrose 5%. 1000 milliLiter(s) (40 mL/Hr) IV Continuous <Continuous>  fentaNYL   Infusion. 0.5 MICROgram(s)/kG/Hr (4 mL/Hr) IV Continuous <Continuous>  hydrALAZINE 50 milliGRAM(s) Oral every 8 hours  insulin lispro (ADMELOG) corrective regimen sliding scale   SubCutaneous every 6 hours  labetalol 300 milliGRAM(s) Enteral Tube every 8 hours  lidocaine 1%/epinephrine 1:100,000 Inj 20 milliLiter(s) Local Injection once  pantoprazole  Injectable 40 milliGRAM(s) IV Push daily  petrolatum Ophthalmic Ointment 1 Application(s) Both EYES two times a day  propofol Infusion 10 MICROgram(s)/kG/Min (4.8 mL/Hr) IV Continuous <Continuous>  senna 2 Tablet(s) Oral at bedtime  simethicone 80 milliGRAM(s) Chew every 6 hours  tamsulosin 0.8 milliGRAM(s) Oral at bedtime    MEDICATIONS  (PRN):  acetaminophen   Oral Liquid .. 650 milliGRAM(s) Oral every 6 hours PRN Temp greater or equal to 38C (100.4F), Mild Pain (1 - 3)    Dosing Anthropometrics:   Height for BMI (FEET)	5 Feet  Height for BMI (INCHES)	8 Inch(s)  Height for BMI (CM)	172.72 Centimeter(s)  Weight for BMI (lbs)	176.4 lb  Weight for BMI (kg)	80 kg  Body Mass Index	              26.8  ideal body weight:               154 pounds, pt is ~115% of ideal body weight    Weight Change: no new weights noted in electronic medical records; recommend that nursing obtain updated weights weekly prn. RD to monitor trends.     Estimated energy needs:  Weight used for calculations	ideal body weight  Estimated Energy Needs Weight (lbs)	154 lb  Estimated Energy Needs Weight (kg)	69.8 kg  Estimated Energy Needs From (christiana/kg)	25  Estimated Energy Needs To (christiana/kg)	30  Estimated Energy Needs Calculated From (christiana/kg)	1745  Estimated Energy Needs Calculated To (christiana/kg)	2094  Weight used for calculations	ideal body weight  Estimated Protein Needs Weight (lbs)	154 lb  Estimated Protein Needs Weight (kg)	69.8 kg  Estimated Protein Needs From (g/kg)	1.2  Estimated Protein Needs To (g/kg)	1.4  Estimated Protein Needs Calculated From (g/kg)	83.76  Estimated Protein Needs Calculated To (g/kg)	97.72  Estimated Fluid Needs Weight (lbs)	154 lb  Estimated Fluid Needs Weight (kg)	69.8 kg  Estimated Fluid Needs From (ml/kg)	25  Estimated Fluid Needs To (ml/kg)	30  Estimated Fluid Needs Calculated From (ml/kg)	1745  Estimated Fluid Needs Calculated To (ml/kg)	2094  Other Calculations	Pt is >100% ideal body weight in the ICU, thus ideal body weight used for all calculations. Needs adjusted for age, clinical course, vented, ICU/critical care status.    Subjective: This is a 44 year old male, unknown past medical history (reported stroke and MI by coworkers) presented to [facility] with AMS, Pt was working at [employer] when he was found down by coworkers. EMS called and pt brought to [facility] ED. Intubated, sedated, started on cardene for SBPs in 200s. CT head showed brain stem bleed. Transferred to NSICU for further management.    Pt assessed and discussed with interdisciplinary team at interdisciplinary rounds. Pt intubated, sedated, on propofol gtt, 24mL/hour, providing ~634 calories/day. No noted pressor support, afebrile, MAPs ranging from , on ventilator/CPAP, SpO2 94-99%. Pt noted with BM on 10/12/24; noted with no distention; nontender, soft abdomen noted per medical team. Nonverbal indicators of pain as absent. Pitting 2+ generalized edema noted; no documented pressure ulcers. Labs reviewed: low sodium (134), low eGFR (24), elevated BUN (38), Creatinine (3.10), serum Glucose (111). Per medical team, pt's tube feeding formula was changed to Jevity formula related to the density of the Nepro formula. RD discussed and provided updated recommendations with medical team so as to not overfeed the patient and adequately meet his needs. Medical team receptive. RD to remain available. Please see additional information/recommendations below.     Previous Nutrition Diagnosis: Inadequate Oral Intake related to inability to meet nutritional demands via PO as evidenced by NPO with tube feedings    Active [ x ]  Resolved [   ]    If resolved, new PES:     Goal: Pt to consistently meet at least 75% of EEE via tolerated route that is consistent with goals of care during hospital stay    Nutrition Recommendations:  1. NPO  **after discussion with medical team, please see and consider the following: with current propofol rate of 24mL/hour (which provides **634 calories/day**), continue Jevity 1.5 via tube feeding via orogastric tube + 2 LPS (liquid protein supplement) per day: start at 20mL/hour, increase by 10mL/hour every 4-6 hours, to GOAL of 40mL/hour x 18 hours, providing 720mL volume from tube feeding, ~1914 calories, ~76g protein, ~547mL free water; this meets ~27 calories/kg ideal body weight, ~1.1g protein/kg ideal body weight, ~23 nonprotein calories/kg ideal body weight (69.8kg), consider ~230mL free water every 4 hours**  **once propofol is weaned off/discontinued, consider the following: Jevity 1.5 via feasible route, start at 20mL/hour, increase by 10mL/hour every 4-6 hours to GOAL of 65mL/hour x 18 hours + 2 LPS (liquid protein supplement), to provide 1170mL total volume from tube feeding, 1955 calories, 105g protein, 889mL free water; this meets ~28 calories/kg ideal body weight, ~22 nonprotein calories/kg ideal body weight, 1.5g protein/kg ideal body weight (70kg); consider 180mL every 4 hours  **continue Banatrol 2x/day to promote fecal bulk**  *Maintain aspiration precautions at all times; monitor for signs/symptoms of intolerance*  2. Monitor weights (weekly), GI function, skin integrity, chemistry/labs  3. Pain and bowel regimen per medical team  4. Align nutrition with goals of care at all times    Education: deferred at this time; RD to follow up prn as appropriate.     Risk Level: High [ x ] Moderate [   ] Low [   ].

## 2024-10-14 NOTE — PROCEDURE NOTE - NSBRONCHFINDINGS_GEN_A_CORE_FT
purulent secretions noted  post-procedure excess blood was suctioned therapeutically  all lobes inspected and no other findings noted

## 2024-10-14 NOTE — PROCEDURE NOTE - NSBRONCHPROCDETAILS_GEN_A_CORE_FT
Bronchoscope inserted through ETT. Airway evaluation revealed Sharp Chris and purulent airway secretions . Trach insertion was viewed midline and guidewire was visualized entering towards chris. Post-procedure bronchoscope was inserted into trach and chris visualized and blood was suctioned.  Bronchoscope then withdrawn. No additional bleeding noted.  Bronchoscope inserted through ETT. Airway evaluation revealed Sharp Chris and purulent airway secretions . Trach insertion was viewed midline and guidewire was visualized entering towards chris. Post-procedure bronchoscope was inserted into trach and chris visualized and blood was suctioned. Bronchial wash performed.  Bronchoscope then withdrawn. No additional bleeding noted.

## 2024-10-14 NOTE — PROGRESS NOTE ADULT - ASSESSMENT
Assessment: 44m unknown pmhx admit for brain stem hematoma 2/2 HTN    NEURO:  ICH score 3   -neuro check q4  -PT/OT evaluation if signs of responsiveness   -MRI brain negative for cavernoma; Cerebeller infarct   -propofol goal RASS -5; & nimbex in preparation for trach     PULMONARY:  Currently requiring mechanical ventilation for air-way protection; CPAP trials as tolerated  -preparation for trach     CARDIOVASCULAR:  monitor on telemetry   vitals q1  sbp goal 100-160  -c/w amlodipine 10mg, labetalol 300mg TID, hydralazine 50mg TID   TTE w/ hypertensive changes     GASTROENTEROLOGY:  NPO for trach  GI ppx : Protonix 40mg qd      RENAL/:  ANIKA on likely CKD ; downtrending Cr - resolving post   -c/w phoslo; avoid nephrotoxic medications   -check BMP qd  -strict i/o's ;gabriel placed in AM 10/14 doxazosin to 0.8mg   -Na goal 135-145; c/w free water 250cc q6; d/c IVF     ENDOCRINE:  A1c- 5.3    HEME/ONC:  DVT ppx: SQH ; hold heparin SQ 10/14 for trach placement   b/l SCDs    INFECTIOUS:   Ancef to continue for MSSA PNA - end date 10/14    Code statues: full code, ongoing GOC w/ palliative

## 2024-10-14 NOTE — PROGRESS NOTE ADULT - SUBJECTIVE AND OBJECTIVE BOX
HPI:  43 yo male, unknown past medical history (reported stroke and MI by coworkers) presented to [facility] with AMS, Pt was working at [employer] when he was found down by coworkers. EMS called and pt brought to [facility] ED. Intubated, sedated, started on cardene for SBPs in 200s. CT head showed brain stem bleed. Transferred to NSICU for further management.  (30 Sep 2024 12:55)    OVERNIGHT EVENTS: GEORGE, on AC/VC all night, npo at midnight    Hospital Course:   9/30: transferred from [facility]. A line placed. Versed dc'd. Cy Rader at bedside, states pt has family in [place], cannot confirm medications or PMH other than stroke and MI. 250cc bolus 3% given. LR switched to NS. hydralazine 25q8 started, 3% started, switched propofol to precedex   10/1: stability CTH done. Added labetalol, started TF. Palliative consulted. ethics consulted to determine surrogate. febrile 103, pan cx sent  10/2: BD 2, GEORGE overnight. TF resumed. Desatt'd to 80s, FiO2 inc. to 50. Fentanyl given, ABG, CXR ordered. Maxxed on precedex, started on propofol for DARIEN -4 - -5. Precedex dc'd. Duonebs, mucomyst, hypertonic added. 3% dc'd. Cardene dc'd. Start vanc/CTX. Increased labetalol 200q8. MRSA negative, dc'd vanc. ETT pulled back 2cm x 2, good positioning after confirmatory chest xray. Ethics attempting to establish HCP with family. Na 159, starting FW 250q6 for range 150-155.   10/3: BD3, GEORGE o/n, neuro stable. Na elevating, FW increased to 300q6. Dc'd bowel reg for diarrhea. vEEG started. SQH 5000q8 tonight.   10/4: BD 4, albumn bolus, incr. LR to 80 2/2 incr. in Cr, LR to 100cc/hr for uptrending Cr. Started 7% hypersal for 48hrs and SL atropine for inline/oral thick secretions. Dc'd CTX and started ancef for MSSA in the sputum. Nephrology consulted for CKD, f/u recs. SBP 170s, given hydralazine 10mg IVP.   10/5: BD5, o/n 10mg IVP hydralazine given for SBP 170s and started on hydralazine 25q8 via OGT. 10mg IV push labetalol for SBP > 160s. RT placed for diarrhea.   10/6: BD6, o/n FW increased to 350q4 per nephrology recs. IV tylenol for temp 100.6, SBp 160s presumed uncomfortable.   10/7: BD7, overnight pancultured for temp 101.8F.   10/8: BD8. GEORGE. Cr bumped. decreased LR to 75cc/hr. Adding simethicone ATC. incr hydralazine 50mgTID. Incr labetalol 300mgTID. Na 145, decreased FWF to 250q6. Start precedex. FENa consistent with intrinsic kidney injury. Pend repeat renal US. Retaining up to 1.3L, bladder scans q6, straight cath PRN  10/9: BD 9. GEORGE overnight. Neuro stable. abd xray for distention w non-specific gas pattern, OGT to LIWS for morning. duonebs/mucomyst to q8 for improving secretions. Changed tube feeds to Jevity 1.5 20cc/hr, low rate due to abdominal distention, nepro dense and more difficult to digest. Tolerating CPAP, confirmed by ABG.   10/10: BD 10. GEORGE overnight. Neuro stable. (+) gabriel for urinary retention on bladder scan. inc TF to goal rate of 40cc/hr. family leaning toward pursuing trach/PEG. 1/2 amp for FS 81.   10/11: BD 11. GEORGE overnight. Neuro stable. Trach/PEG consults placed.   10/12: BD 12. GEORGE overnight. Neuro stable. MRI brain complete.   10/13: BD 13. Increase flomax. Hold SQH after PM dose for trach tm. IVL.   10/14: BD 14. GEORGE overnight, remains on AC/VC.     Vital Signs Last 24 Hrs  T(C): 36.9 (13 Oct 2024 18:17), Max: 37.1 (13 Oct 2024 14:38)  T(F): 98.5 (13 Oct 2024 18:17), Max: 98.8 (13 Oct 2024 14:38)  HR: 82 (13 Oct 2024 20:00) (78 - 89)  BP: 131/84 (13 Oct 2024 05:40) (125/82 - 131/84)  BP(mean): 101 (13 Oct 2024 05:40) (98 - 101)  RR: 12 (13 Oct 2024 20:00) (8 - 18)  SpO2: 100% (13 Oct 2024 20:00) (97% - 100%)    Parameters below as of 13 Oct 2024 20:00  Patient On (Oxygen Delivery Method): ventilator    O2 Concentration (%): 40    I&O's Summary    12 Oct 2024 07:01  -  13 Oct 2024 07:00  --------------------------------------------------------  IN: 2770 mL / OUT: 3600 mL / NET: -830 mL    13 Oct 2024 07:01  -  13 Oct 2024 21:13  --------------------------------------------------------  IN: 1580 mL / OUT: 1900 mL / NET: -320 mL        PHYSICAL EXAM:  General: patient seen laying supine in bed in NAD on ACVC  Neuro: not OE, +L corneal -R corneal, +cough/gag, b/l UE EP, b/l LE TF  HEENT: PERRL 2 mm  Neck: supple  Cardiac: RRR, S1S2  Pulmonary: chest rise symmetric  Abdomen: soft, nontender, nondistended  Ext: perfusing well  Skin: warm, dry        TUBES/LINES:  [] Gabriel  [] Lumbar Drain  [] Wound Drains  [] Others      DIET:  [x] NPO after midnight  [] Mechanical  [x] Tube feeds    LABS:                        10.3   7.94  )-----------( 287      ( 13 Oct 2024 05:30 )             32.7     10-13    135  |  103  |  36[H]  ----------------------------<  96  4.5   |  21[L]  |  3.05[H]    Ca    8.5      13 Oct 2024 09:23  Phos  4.0     10-13  Mg     1.9     10-13      PT/INR - ( 13 Oct 2024 05:30 )   PT: 11.4 sec;   INR: 0.99          PTT - ( 13 Oct 2024 05:30 )  PTT:30.9 sec  Urinalysis Basic - ( 13 Oct 2024 09:23 )    Color: x / Appearance: x / SG: x / pH: x  Gluc: 96 mg/dL / Ketone: x  / Bili: x / Urobili: x   Blood: x / Protein: x / Nitrite: x   Leuk Esterase: x / RBC: x / WBC x   Sq Epi: x / Non Sq Epi: x / Bacteria: x          CAPILLARY BLOOD GLUCOSE      POCT Blood Glucose.: 97 mg/dL (13 Oct 2024 17:19)  POCT Blood Glucose.: 97 mg/dL (13 Oct 2024 12:23)  POCT Blood Glucose.: 100 mg/dL (13 Oct 2024 05:57)  POCT Blood Glucose.: 96 mg/dL (12 Oct 2024 23:19)  POCT Blood Glucose.: 102 mg/dL (12 Oct 2024 21:50)      Drug Levels: [] N/A    CSF Analysis: [] N/A      Allergies    Allergy Status Unknown    Intolerances      MEDICATIONS:  Antibiotics:  ceFAZolin   IVPB 1000 milliGRAM(s) IV Intermittent every 12 hours    Neuro:  acetaminophen   Oral Liquid .. 650 milliGRAM(s) Oral every 6 hours PRN    Anticoagulation:    OTHER:  acetylcysteine 10%  Inhalation 4 milliLiter(s) Inhalation every 12 hours  albuterol/ipratropium for Nebulization 3 milliLiter(s) Nebulizer every 12 hours  amLODIPine   Tablet 10 milliGRAM(s) Oral daily  chlorhexidine 0.12% Liquid 15 milliLiter(s) Oral Mucosa every 12 hours  chlorhexidine 2% Cloths 1 Application(s) Topical <User Schedule>  hydrALAZINE 50 milliGRAM(s) Oral every 8 hours  insulin lispro (ADMELOG) corrective regimen sliding scale   SubCutaneous every 6 hours  labetalol 300 milliGRAM(s) Enteral Tube every 8 hours  pantoprazole  Injectable 40 milliGRAM(s) IV Push daily  petrolatum Ophthalmic Ointment 1 Application(s) Both EYES two times a day  simethicone 80 milliGRAM(s) Chew every 6 hours  tamsulosin 0.8 milliGRAM(s) Oral at bedtime    IVF:  calcium acetate 667 milliGRAM(s) Oral three times a day with meals  dextrose 5%. 1000 milliLiter(s) IV Continuous <Continuous>    CULTURES:  Culture Results:   Commensal iram consistent with body site (10-07 @ 04:17)  Culture Results:   No growth at 5 days (10-07 @ 03:30)    RADIOLOGY & ADDITIONAL TESTS:      ASSESSMENT:  45 y/o PMH ?stroke/MI present to [facility] after collapsing at work. Decorticate posturing, vomiting, intubated for airway protection. 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 Transferred to [facility] for further management.     AMS    Handoff    Acute myocardial infarction    CVA (cerebral vascular accident)    Hemorrhagic stroke    Brainstem stroke    Encephalopathy acute    Functional quadriplegia    Advanced care planning/counseling discussion    Encounter for palliative care    [facility]    [email or web address]        PLAN:  Neuro:  - Neuro q4/vitals q1  - pain control: Tylenol prn   - CTH 9/30: enlarged pontine hemorrhage, CTH 10/3 done read stable   - vEEG (10/3-4)- negative  - stroke core measures, stroke neuro following  - MRI brain w/ w/o contrast 10/12: parenchymal hemorrhage, acute/subacute R cerebellar stroke      CV:  - SBP <160  - HTN: amlodipine 10mg qd, labetalol 300mg q8h, hydralazine 50mg q8h  - echo (9/30) EF 75%    Resp:  - Intubated, CPAP as tolerated  - duonebs/mucomyst q12    GI:  - TF via OGT, NPO @ midnight for tach @1pm  - bowel reg held for diarrhea, RT (10/5-10/12)   - GI ppx: Protonix while intubated  - Abd distension: Simethicone 80mg q6    Renal:  - D5 @ 40cc/hr while NPO  - Hyperphosphatemia: phoslo TID  - Urinary retention: bladder scan, SC q6  - Hydration:  q6hrs  - Urinary retenion: Flomax 0.8mg at bedtime   - CKD: nephro following, trend Cr   - renal US 10/1: echogenicity c/w chronic med renal dz, repeat done 10/8: inc renal echogeicity, c/f medical renal disease w increased hydro     Endo:  - ISS, A1c 5.4    Heme:  - DVT ppx: SCDs, SQH 5000q8 held for trach     ID:  - Last pan cx 10/12  - MRSA swab negative, sputum MSSA + , s/p 1 dose vanc (10/2), CTX (10/2 - 10/4), Ancef (10/4-10/14)     Dispo: NSICU, full code, pending PT/OT    D/w Dr. D'Amico, Dr. Llanes    Assessment:  Present when checked

## 2024-10-14 NOTE — BRIEF OPERATIVE NOTE - COMMENTS
Percutaneous tracheostomy performed with bronchoscopic visualization for guidance.  Percutaneous tracheostomy performed with bronchoscopic visualization for guidance.       I was present throughout entirety of procedure.

## 2024-10-14 NOTE — PROGRESS NOTE ADULT - SUBJECTIVE AND OBJECTIVE BOX
PULMONARY CONSULT SERVICE FOLLOW-UP NOTE    INTERVAL HPI:  Reviewed chart and overnight events; patient seen and examined at bedside.  Appears more comfortable  Chest    MEDICATIONS:  Pulmonary:  acetylcysteine 10%  Inhalation 4 milliLiter(s) Inhalation every 12 hours  albuterol/ipratropium for Nebulization 3 milliLiter(s) Nebulizer every 12 hours    Antimicrobials:  ceFAZolin   IVPB 1000 milliGRAM(s) IV Intermittent every 12 hours    Anticoagulants:    Cardiac:  amLODIPine   Tablet 10 milliGRAM(s) Oral daily  hydrALAZINE 50 milliGRAM(s) Oral every 8 hours  labetalol 300 milliGRAM(s) Enteral Tube every 8 hours      Allergies    Allergy Status Unknown    Intolerances        Vital Signs Last 24 Hrs  T(C): 36.8 (14 Oct 2024 09:06), Max: 37.1 (13 Oct 2024 14:38)  T(F): 98.2 (14 Oct 2024 09:06), Max: 98.8 (13 Oct 2024 14:38)  HR: 81 (14 Oct 2024 10:00) (80 - 89)  BP: 129/86 (13 Oct 2024 21:00) (129/86 - 129/86)  BP(mean): 101 (13 Oct 2024 21:00) (101 - 101)  RR: 17 (14 Oct 2024 10:00) (9 - 19)  SpO2: 97% (14 Oct 2024 10:00) (95% - 100%)    Parameters below as of 14 Oct 2024 10:00  Patient On (Oxygen Delivery Method): ventilator    O2 Concentration (%): 40    10-13 @ 07:01  -  10-14 @ 07:00  --------------------------------------------------------  IN: 2493.8 mL / OUT: 3860 mL / NET: -1366.2 mL    10-14 @ 07:01  -  10-14 @ 11:26  --------------------------------------------------------  IN: 195.4 mL / OUT: 605 mL / NET: -409.6 mL      Mode: AC/ CMV (Assist Control/ Continuous Mandatory Ventilation)  RR (machine): 12  TV (machine): 430  FiO2: 40  PEEP: 5  ITime: 0.9  MAP: 7  PIP: 14      PHYSICAL EXAM:  Constitutional: NAD  HEENT: NC/AT; PERRL, anicteric sclera; MMM  Neck: supple  Cardiovascular: +S1/S2, RRR  Respiratory: CTA B/L; no W/R/R  Gastrointestinal: soft, NT/ND  Extremities: WWP; no edema, clubbing or cyanosis  Vascular: 2+ radial pulses B/L  Neurological: awake and alert; BURROUGHS    LABS:      CBC Full  -  ( 14 Oct 2024 05:22 )  WBC Count : 11.07 K/uL  RBC Count : 3.87 M/uL  Hemoglobin : 11.3 g/dL  Hematocrit : 34.5 %  Platelet Count - Automated : 318 K/uL  Mean Cell Volume : 89.1 fl  Mean Cell Hemoglobin : 29.2 pg  Mean Cell Hemoglobin Concentration : 32.8 gm/dL  Auto Neutrophil # : x  Auto Lymphocyte # : x  Auto Monocyte # : x  Auto Eosinophil # : x  Auto Basophil # : x  Auto Neutrophil % : x  Auto Lymphocyte % : x  Auto Monocyte % : x  Auto Eosinophil % : x  Auto Basophil % : x    10-14    134[L]  |  102  |  38[H]  ----------------------------<  111[H]  4.4   |  20[L]  |  3.10[H]    Ca    8.7      14 Oct 2024 05:22  Phos  4.2     10-14  Mg     1.8     10-14      PT/INR - ( 13 Oct 2024 05:30 )   PT: 11.4 sec;   INR: 0.99          PTT - ( 13 Oct 2024 05:30 )  PTT:30.9 sec      Urinalysis Basic - ( 14 Oct 2024 05:22 )    Color: x / Appearance: x / SG: x / pH: x  Gluc: 111 mg/dL / Ketone: x  / Bili: x / Urobili: x   Blood: x / Protein: x / Nitrite: x   Leuk Esterase: x / RBC: x / WBC x   Sq Epi: x / Non Sq Epi: x / Bacteria: x                RADIOLOGY & ADDITIONAL STUDIES: PULMONARY CONSULT SERVICE FOLLOW-UP NOTE    INTERVAL HPI:  Reviewed chart and overnight events; patient seen and examined at bedside.  Appears comfortable.   Sedated on propofol and fentanyl with RASS -5 goal for tracheostomy.     MEDICATIONS:  Pulmonary:  acetylcysteine 10%  Inhalation 4 milliLiter(s) Inhalation every 12 hours  albuterol/ipratropium for Nebulization 3 milliLiter(s) Nebulizer every 12 hours    Antimicrobials:  ceFAZolin   IVPB 1000 milliGRAM(s) IV Intermittent every 12 hours    Anticoagulants:    Cardiac:  amLODIPine   Tablet 10 milliGRAM(s) Oral daily  hydrALAZINE 50 milliGRAM(s) Oral every 8 hours  labetalol 300 milliGRAM(s) Enteral Tube every 8 hours      Allergies    Allergy Status Unknown    Intolerances        Vital Signs Last 24 Hrs  T(C): 36.8 (14 Oct 2024 09:06), Max: 37.1 (13 Oct 2024 14:38)  T(F): 98.2 (14 Oct 2024 09:06), Max: 98.8 (13 Oct 2024 14:38)  HR: 81 (14 Oct 2024 10:00) (80 - 89)  BP: 129/86 (13 Oct 2024 21:00) (129/86 - 129/86)  BP(mean): 101 (13 Oct 2024 21:00) (101 - 101)  RR: 17 (14 Oct 2024 10:00) (9 - 19)  SpO2: 97% (14 Oct 2024 10:00) (95% - 100%)    Parameters below as of 14 Oct 2024 10:00  Patient On (Oxygen Delivery Method): ventilator    O2 Concentration (%): 40    10-13 @ 07:01  -  10-14 @ 07:00  --------------------------------------------------------  IN: 2493.8 mL / OUT: 3860 mL / NET: -1366.2 mL    10-14 @ 07:01  -  10-14 @ 11:26  --------------------------------------------------------  IN: 195.4 mL / OUT: 605 mL / NET: -409.6 mL      Mode: AC/ CMV (Assist Control/ Continuous Mandatory Ventilation)  RR (machine): 12  TV (machine): 430  FiO2: 40  PEEP: 5  ITime: 0.9  MAP: 7  PIP: 14      PHYSICAL EXAM:  Constitutional: NAD  HEENT: NC/AT; PERRL, anicteric sclera; MMM  Neck: supple  Cardiovascular: +S1/S2, RRR  Respiratory: CTA B/L; no W/R/R  Gastrointestinal: soft, NT/ND  Extremities: WWP; no edema, clubbing or cyanosis  Vascular: 2+ radial pulses B/L  Neurological: sedated on mechanical ventilator     LABS:      CBC Full  -  ( 14 Oct 2024 05:22 )  WBC Count : 11.07 K/uL  RBC Count : 3.87 M/uL  Hemoglobin : 11.3 g/dL  Hematocrit : 34.5 %  Platelet Count - Automated : 318 K/uL  Mean Cell Volume : 89.1 fl  Mean Cell Hemoglobin : 29.2 pg  Mean Cell Hemoglobin Concentration : 32.8 gm/dL  Auto Neutrophil # : x  Auto Lymphocyte # : x  Auto Monocyte # : x  Auto Eosinophil # : x  Auto Basophil # : x  Auto Neutrophil % : x  Auto Lymphocyte % : x  Auto Monocyte % : x  Auto Eosinophil % : x  Auto Basophil % : x    10-14    134[L]  |  102  |  38[H]  ----------------------------<  111[H]  4.4   |  20[L]  |  3.10[H]    Ca    8.7      14 Oct 2024 05:22  Phos  4.2     10-14  Mg     1.8     10-14      PT/INR - ( 13 Oct 2024 05:30 )   PT: 11.4 sec;   INR: 0.99          PTT - ( 13 Oct 2024 05:30 )  PTT:30.9 sec      Urinalysis Basic - ( 14 Oct 2024 05:22 )    Color: x / Appearance: x / SG: x / pH: x  Gluc: 111 mg/dL / Ketone: x  / Bili: x / Urobili: x   Blood: x / Protein: x / Nitrite: x   Leuk Esterase: x / RBC: x / WBC x   Sq Epi: x / Non Sq Epi: x / Bacteria: x                RADIOLOGY & ADDITIONAL STUDIES:

## 2024-10-14 NOTE — PROGRESS NOTE ADULT - SUBJECTIVE AND OBJECTIVE BOX
NSCU ATTENDING -- ADDITIONAL PROGRESS NOTE    Nighttime rounds were performed       ICU Vital Signs Last 24 Hrs  T(C): 36.3 (14 Oct 2024 18:01), Max: 37.1 (14 Oct 2024 05:23)  T(F): 97.4 (14 Oct 2024 18:01), Max: 98.8 (14 Oct 2024 05:23)  HR: 79 (14 Oct 2024 19:00) (71 - 89)  BP: 129/86 (13 Oct 2024 21:00) (129/86 - 129/86)  BP(mean): 101 (13 Oct 2024 21:00) (101 - 101)  ABP: 128/77 (14 Oct 2024 19:00) (87/66 - 155/92)  ABP(mean): 92 (14 Oct 2024 19:00) (72 - 113)  RR: 11 (14 Oct 2024 19:00) (9 - 21)  SpO2: 97% (14 Oct 2024 19:00) (94% - 100%)      10-13-24 @ 07:01  -  10-14-24 @ 07:00  --------------------------------------------------------  IN: 2493.8 mL / OUT: 3860 mL / NET: -1366.2 mL    10-14-24 @ 07:01  -  10-14-24 @ 19:14  --------------------------------------------------------  IN: 809.4 mL / OUT: 1135 mL / NET: -325.6 mL      Mode: CPAP with PS, FiO2: 40, PEEP: 5, MAP: 7, PIP: 11      PHYSICAL EXAM:  Neuro: Dysconjugate gaze, absent R corneal reflex, L present, does not doll to movement of head, strong cough,   RUE extension, LUE minimal movement to nox, b/l LE WD to nox,  Pulm: Diminshed  Cards: S1, S2, Regular Rate and Rhythm   GI: Soft, Nontender, Nondistended   Extremities: No LE edema       MEDICATIONS:   acetaminophen   Oral Liquid .. 650 milliGRAM(s) Oral every 6 hours PRN  acetylcysteine 10%  Inhalation 4 milliLiter(s) Inhalation every 12 hours  albuterol/ipratropium for Nebulization 3 milliLiter(s) Nebulizer every 12 hours  amLODIPine   Tablet 10 milliGRAM(s) Oral daily  calcium acetate 667 milliGRAM(s) Oral three times a day with meals  chlorhexidine 2% Cloths 1 Application(s) Topical <User Schedule>  dextrose 5%. 1000 milliLiter(s) (40 mL/Hr) IV Continuous <Continuous>  hydrALAZINE 50 milliGRAM(s) Oral every 8 hours  insulin lispro (ADMELOG) corrective regimen sliding scale   SubCutaneous every 6 hours  labetalol 300 milliGRAM(s) Enteral Tube every 8 hours  norepinephrine Infusion 0.05 MICROgram(s)/kG/Min (7.5 mL/Hr) IV Continuous <Continuous>  oxyCODONE    IR 5 milliGRAM(s) Oral every 4 hours PRN  pantoprazole  Injectable 40 milliGRAM(s) IV Push daily  petrolatum Ophthalmic Ointment 1 Application(s) Both EYES two times a day  senna 2 Tablet(s) Oral at bedtime  simethicone 80 milliGRAM(s) Chew every 6 hours  tamsulosin 0.8 milliGRAM(s) Oral at bedtime      LABS:                         11.3   11.07 )-----------( 318      ( 14 Oct 2024 05:22 )             34.5     10-14    134[L]  |  102  |  38[H]  ----------------------------<  111[H]  4.4   |  20[L]  |  3.10[H]    Ca    8.7      14 Oct 2024 05:22  Phos  4.2     10-14  Mg     1.8     10-14      ABG - ( 14 Oct 2024 17:42 )  pH, Arterial: 7.34  pH, Blood: x     /  pCO2: 40    /  pO2: 83    / HCO3: 22    / Base Excess: -3.9  /  SaO2: 97.0          ASSESSMENT/PLAN:  73 y/o M with 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 CPAP as tolerated  Pulm toilet    CV:  SBP goal 100-160  Labetalol, hydralazine, amlodipine    RENAL: CKD  Na goal 135-145; DC free water  Vuong placed in AM 10/14 doxazosin to 0.8mg   Nephrology following    GI:  Diet: Resume TFs  GI prophylaxis [] not indicated [x] PPI [] other:  Bowel regimen [] colace [x] senna [] other: miralax  LBM:     ENDO:   Goal euglycemia (-180)  A1c: 5.3    HEME/ONC:  VTE prophylaxis: [x] SCDs [x] chemoprophylaxis held for trach    ID:   S/P Ancef    Patient remains critically ill.    Additional 45 minutes of critical care time.                 NSCU ATTENDING -- ADDITIONAL PROGRESS NOTE    Nighttime rounds were performed       ICU Vital Signs Last 24 Hrs  T(C): 36.3 (14 Oct 2024 18:01), Max: 37.1 (14 Oct 2024 05:23)  T(F): 97.4 (14 Oct 2024 18:01), Max: 98.8 (14 Oct 2024 05:23)  HR: 79 (14 Oct 2024 19:00) (71 - 89)  BP: 129/86 (13 Oct 2024 21:00) (129/86 - 129/86)  BP(mean): 101 (13 Oct 2024 21:00) (101 - 101)  ABP: 128/77 (14 Oct 2024 19:00) (87/66 - 155/92)  ABP(mean): 92 (14 Oct 2024 19:00) (72 - 113)  RR: 11 (14 Oct 2024 19:00) (9 - 21)  SpO2: 97% (14 Oct 2024 19:00) (94% - 100%)      10-13-24 @ 07:01  -  10-14-24 @ 07:00  --------------------------------------------------------  IN: 2493.8 mL / OUT: 3860 mL / NET: -1366.2 mL    10-14-24 @ 07:01  -  10-14-24 @ 19:14  --------------------------------------------------------  IN: 809.4 mL / OUT: 1135 mL / NET: -325.6 mL      Mode: CPAP with PS, FiO2: 40, PEEP: 5, MAP: 7, PIP: 11      PHYSICAL EXAM:  Neuro: Dysconjugate gaze, absent R corneal reflex, L present, does not doll to movement of head, strong cough,   RUE extension, LUE extension, b/l  LE TFs  Pulm: Diminshed  Cards: S1, S2, RRR  GI: Soft, Nontender, Nondistended   Extremities: No LE edema       MEDICATIONS:   acetaminophen   Oral Liquid .. 650 milliGRAM(s) Oral every 6 hours PRN  acetylcysteine 10%  Inhalation 4 milliLiter(s) Inhalation every 12 hours  albuterol/ipratropium for Nebulization 3 milliLiter(s) Nebulizer every 12 hours  amLODIPine   Tablet 10 milliGRAM(s) Oral daily  calcium acetate 667 milliGRAM(s) Oral three times a day with meals  chlorhexidine 2% Cloths 1 Application(s) Topical <User Schedule>  dextrose 5%. 1000 milliLiter(s) (40 mL/Hr) IV Continuous <Continuous>  hydrALAZINE 50 milliGRAM(s) Oral every 8 hours  insulin lispro (ADMELOG) corrective regimen sliding scale   SubCutaneous every 6 hours  labetalol 300 milliGRAM(s) Enteral Tube every 8 hours  norepinephrine Infusion 0.05 MICROgram(s)/kG/Min (7.5 mL/Hr) IV Continuous <Continuous>  oxyCODONE    IR 5 milliGRAM(s) Oral every 4 hours PRN  pantoprazole  Injectable 40 milliGRAM(s) IV Push daily  petrolatum Ophthalmic Ointment 1 Application(s) Both EYES two times a day  senna 2 Tablet(s) Oral at bedtime  simethicone 80 milliGRAM(s) Chew every 6 hours  tamsulosin 0.8 milliGRAM(s) Oral at bedtime      LABS:                         11.3   11.07 )-----------( 318      ( 14 Oct 2024 05:22 )             34.5     10-14    134[L]  |  102  |  38[H]  ----------------------------<  111[H]  4.4   |  20[L]  |  3.10[H]    Ca    8.7      14 Oct 2024 05:22  Phos  4.2     10-14  Mg     1.8     10-14      ABG - ( 14 Oct 2024 17:42 )  pH, Arterial: 7.34  pH, Blood: x     /  pCO2: 40    /  pO2: 83    / HCO3: 22    / Base Excess: -3.9  /  SaO2: 97.0          ASSESSMENT/PLAN:  71 y/o M with 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 CPAP as tolerated  Pulm toilet    CV:  SBP goal 100-160  Labetalol, hydralazine, amlodipine    RENAL: CKD  Na goal 135-145; DCed free water  Vuong placed in AM 10/14. Doxazosin 0.8mg   Nephrology following    GI:  Diet: Resume TFs  GI prophylaxis [] not indicated [x] PPI [] other:  Bowel regimen [] colace [x] senna [] other: miralax  LBM: 10/12    ENDO:   Goal euglycemia (-180)  A1c: 5.3    HEME/ONC:  VTE prophylaxis: [x] SCDs [x] chemoprophylaxis held for trach    ID:   S/P Ancef    Patient remains critically ill.    Additional 45 minutes of critical care time.

## 2024-10-14 NOTE — PROGRESS NOTE ADULT - ASSESSMENT
44 year old found to have brainstem stroke unable to be extubated secondary to mental status with plan for tracheostomy leading to pulmonary consultation.      Acute hypoxic respiratory failure  Brain stem stroke  Acute encephalopathy      Patient neck was able to be easily palpated and extended and will be idea candidate for percutaneous tracheostomy. Will plan to perform this Monday afternoon. Please hold patient feeds 6 hours prior to procedure so that he can be laid flat. Will need coags Monday morning as well as holding any AC. Patient will need to be DARIEN -4 to -5 day of procedure to reduce cough reflex and allow for paralysis.  Consent received from son Giancarlo Reyes (health care surrogate).     S/p percutaneous tracheostomy today without complication. CXR shows appropriate position.     - keep fentanyl on for at least 24 hours post procedure  - we will remove sutures in 7-10 days    Please page pulmonary immediately if there are any concerns/complications related to trach, such as poor ventilator, suspected obstruction, air leak.  If tracheostomy is displaced/falling out or if there is any doubt about positioning, please do not reinsert, and page pulmonary for assessment.     Patient was seen, evaluated and discussed with PCCM attending.    Erick Gonzalez MD  PCCM Fellow, PGY-5    Please page pulmonary if there are any questions with above recommendations.

## 2024-10-14 NOTE — PROGRESS NOTE ADULT - SUBJECTIVE AND OBJECTIVE BOX
INTERVAL HISTORY: HPI:  Unknown age male, unknown past medical history (reported stroke and MI by coworkers) presented to [facility] with AMS, Pt was working at [employer] when he was found down by coworkers. EMS called and pt brought to [facility] ED. Intubated, sedated, started on cardene for SBPs in 200s. CT head showed brain stem bleed. Transferred to NSICU for further management.  (30 Sep 2024 12:55)    REVIEW OF SYSTEMS: [ ] Unable to Assess due to neurologic exam   [ ] All ROS addressed below are non-contributory, except:  Neuro: [ ] Headache [ ] Back pain [ ] Numbness [ ] Weakness [ ] Ataxia [ ] Dizziness [ ] Aphasia [ ] Dysarthria [ ] Visual disturbance  Resp: [ ] Shortness of breath/dyspnea, [ ] Orthopnea [ ] Cough  CV: [ ] Chest pain [ ] Palpitation [ ] Lightheadedness [ ] Syncope  Renal: [ ] Thirst [ ] Edema  GI: [ ] Nausea [ ] Emesis [ ] Abdominal pain [ ] Constipation [ ] Diarrhea  Hem: [ ] Hematemesis [ ] bright red blood per rectum  ID: [ ] Fever [ ] Chills [ ] Dysuria  ENT: [ ] Rhinorrhea    PHYSICAL EXAM:    General: intubated   Neurological: dysconjugate gaze, ? following with eyes(at times looks down to command),  absent R corneal reflex, L is still present, does not doll to movement of head, strong cough, able to maintain spontaneous breaths on CPAP, RUE extension, LUE minimal movement to nox, b/l LE WD to nox,  Pulmonary: Clear to Auscultation, No Rales, No Rhonchi, No Wheezes   Cardiovascular: S1, S2, Regular Rate and Rhythm   Gastrointestinal: Soft, Nontender, Nondistended   Extremities: No LE edema     ICU Vital Signs Last 24 Hrs  T(C): 36.4 (14 Oct 2024 13:41), Max: 37.1 (13 Oct 2024 14:38)  T(F): 97.6 (14 Oct 2024 13:41), Max: 98.8 (13 Oct 2024 14:38)  HR: 77 (14 Oct 2024 12:15) (77 - 89)  BP: 129/86 (13 Oct 2024 21:00) (129/86 - 129/86)  BP(mean): 101 (13 Oct 2024 21:00) (101 - 101)  ABP: 115/74 (14 Oct 2024 12:00) (103/57 - 163/101)  ABP(mean): 87 (14 Oct 2024 12:00) (72 - 123)  RR: 15 (14 Oct 2024 12:15) (9 - 19)  SpO2: 100% (14 Oct 2024 12:15) (95% - 100%)      10-13-24 @ 07:01  -  10-14-24 @ 07:00  --------------------------------------------------------  IN: 2493.8 mL / OUT: 3860 mL / NET: -1366.2 mL    10-14-24 @ 07:01  -  10-14-24 @ 13:58  --------------------------------------------------------  IN: 436.4 mL / OUT: 890 mL / NET: -453.6 mL      Mode: AC/ CMV (Assist Control/ Continuous Mandatory Ventilation), RR (machine): 12, TV (machine): 430, FiO2: 100, PEEP: 5, ITime: 0.9, MAP: 8, PIP: 15    acetaminophen   Oral Liquid .. 650 milliGRAM(s) Oral every 6 hours PRN  acetylcysteine 10%  Inhalation 4 milliLiter(s) Inhalation every 12 hours  albuterol/ipratropium for Nebulization 3 milliLiter(s) Nebulizer every 12 hours  amLODIPine   Tablet 10 milliGRAM(s) Oral daily  calcium acetate 667 milliGRAM(s) Oral three times a day with meals  chlorhexidine 2% Cloths 1 Application(s) Topical <User Schedule>  dextrose 5%. 1000 milliLiter(s) (40 mL/Hr) IV Continuous <Continuous>  fentaNYL   Infusion. 0.5 MICROgram(s)/kG/Hr (4 mL/Hr) IV Continuous <Continuous>  hydrALAZINE 50 milliGRAM(s) Oral every 8 hours  insulin lispro (ADMELOG) corrective regimen sliding scale   SubCutaneous every 6 hours  labetalol 300 milliGRAM(s) Enteral Tube every 8 hours  lidocaine 1%/epinephrine 1:100,000 Inj 20 milliLiter(s) Local Injection once  pantoprazole  Injectable 40 milliGRAM(s) IV Push daily  petrolatum Ophthalmic Ointment 1 Application(s) Both EYES two times a day  propofol Infusion 10 MICROgram(s)/kG/Min (4.8 mL/Hr) IV Continuous <Continuous>  senna 2 Tablet(s) Oral at bedtime  simethicone 80 milliGRAM(s) Chew every 6 hours  tamsulosin 0.8 milliGRAM(s) Oral at bedtime      LABS:  Na: 134 (10-14 @ 05:22), 135 (10-13 @ 09:23), 139 (10-12 @ 06:47)  K: 4.4 (10-14 @ 05:22), 4.5 (10-13 @ 09:23), 4.2 (10-12 @ 06:47)  Cl: 102 (10-14 @ 05:22), 103 (10-13 @ 09:23), 105 (10-12 @ 06:47)  CO2: 20 (10-14 @ 05:22), 21 (10-13 @ 09:23), 22 (10-12 @ 06:47)  BUN: 38 (10-14 @ 05:22), 36 (10-13 @ 09:23), 39 (10-12 @ 06:47)  Cr: 3.10 (10-14 @ 05:22), 3.05 (10-13 @ 09:23), 3.54 (10-12 @ 06:47)  Glu: 111(10-14 @ 05:22), 96(10-13 @ 09:23), 104(10-12 @ 06:47)    Hgb: 11.3 (10-14 @ 05:22), 10.3 (10-13 @ 05:30), 10.8 (10-12 @ 06:47)  Hct: 34.5 (10-14 @ 05:22), 32.7 (10-13 @ 05:30), 33.9 (10-12 @ 06:47)  WBC: 11.07 (10-14 @ 05:22), 7.94 (10-13 @ 05:30), 7.29 (10-12 @ 06:47)  Plt: 318 (10-14 @ 05:22), 287 (10-13 @ 05:30), 250 (10-12 @ 06:47)    INR: 0.99 10-13-24 @ 05:30  PTT: 30.9 10-13-24 @ 05:30

## 2024-10-14 NOTE — PROGRESS NOTE ADULT - SUBJECTIVE AND OBJECTIVE BOX
INTERVAL HPI/OVERNIGHT EVENTS: GEORGE overnight    SUBJECTIVE:  Patient seen and examined on AM rounds. Afebrile, VSS. TF on hold for trach today. OG tube working. ABodmen soft, NT, ND, NG, NR. Vuong in place.     MEDICATIONS  (STANDING):  acetylcysteine 10%  Inhalation 4 milliLiter(s) Inhalation every 12 hours  albuterol/ipratropium for Nebulization 3 milliLiter(s) Nebulizer every 12 hours  amLODIPine   Tablet 10 milliGRAM(s) Oral daily  calcium acetate 667 milliGRAM(s) Oral three times a day with meals  ceFAZolin   IVPB 1000 milliGRAM(s) IV Intermittent every 12 hours  chlorhexidine 2% Cloths 1 Application(s) Topical <User Schedule>  cisatracurium Injectable 20 milliGRAM(s) IV Push once  dextrose 5%. 1000 milliLiter(s) (40 mL/Hr) IV Continuous <Continuous>  fentaNYL   Infusion. 0.5 MICROgram(s)/kG/Hr (4 mL/Hr) IV Continuous <Continuous>  fentaNYL   Infusion. 0.5 MICROgram(s)/kG/Hr (4 mL/Hr) IV Continuous <Continuous>  hydrALAZINE 50 milliGRAM(s) Oral every 8 hours  insulin lispro (ADMELOG) corrective regimen sliding scale   SubCutaneous every 6 hours  labetalol 300 milliGRAM(s) Enteral Tube every 8 hours  pantoprazole  Injectable 40 milliGRAM(s) IV Push daily  petrolatum Ophthalmic Ointment 1 Application(s) Both EYES two times a day  propofol Infusion 10 MICROgram(s)/kG/Min (4.8 mL/Hr) IV Continuous <Continuous>  senna 2 Tablet(s) Oral at bedtime  simethicone 80 milliGRAM(s) Chew every 6 hours  tamsulosin 0.8 milliGRAM(s) Oral at bedtime    MEDICATIONS  (PRN):  acetaminophen   Oral Liquid .. 650 milliGRAM(s) Oral every 6 hours PRN Temp greater or equal to 38C (100.4F), Mild Pain (1 - 3)      Vital Signs Last 24 Hrs  T(C): 36.8 (14 Oct 2024 09:06), Max: 37.1 (13 Oct 2024 14:38)  T(F): 98.2 (14 Oct 2024 09:06), Max: 98.8 (13 Oct 2024 14:38)  HR: 81 (14 Oct 2024 10:00) (80 - 89)  BP: 129/86 (13 Oct 2024 21:00) (129/86 - 129/86)  BP(mean): 101 (13 Oct 2024 21:00) (101 - 101)  RR: 17 (14 Oct 2024 10:00) (9 - 19)  SpO2: 97% (14 Oct 2024 10:00) (95% - 100%)    Parameters below as of 14 Oct 2024 10:00  Patient On (Oxygen Delivery Method): ventilator    O2 Concentration (%): 40    Physical Exam  General: Sedated  Cardio: S1,S2, No MRG  Pulm: Nonlabored breathing  Abdomen: soft, non-distended, non-tender, no scars noted. Has a rectal tube in place and is making BMs.   Extremities: WWP, peripheral pulses appreciated      I&O's Detail    13 Oct 2024 07:01  -  14 Oct 2024 07:00  --------------------------------------------------------  IN:    dextrose 5%: 480 mL    Free Water: 750 mL    IV PiggyBack: 75 mL    Jevity 1.5: 520 mL    Lactated Ringers: 640 mL    Propofol: 28.8 mL  Total IN: 2493.8 mL    OUT:    Indwelling Catheter - Urethral (mL): 985 mL    Intermittent Catheterization - Urethral (mL): 2875 mL  Total OUT: 3860 mL    Total NET: -1366.2 mL      14 Oct 2024 07:01  -  14 Oct 2024 11:54  --------------------------------------------------------  IN:    dextrose 5%: 120 mL    IV PiggyBack: 25 mL    Propofol: 50.4 mL  Total IN: 195.4 mL    OUT:    Indwelling Catheter - Urethral (mL): 605 mL  Total OUT: 605 mL    Total NET: -409.6 mL          LABS:                        11.3   11.07 )-----------( 318      ( 14 Oct 2024 05:22 )             34.5     10-14    134[L]  |  102  |  38[H]  ----------------------------<  111[H]  4.4   |  20[L]  |  3.10[H]    Ca    8.7      14 Oct 2024 05:22  Phos  4.2     10-14  Mg     1.8     10-14      PT/INR - ( 13 Oct 2024 05:30 )   PT: 11.4 sec;   INR: 0.99          PTT - ( 13 Oct 2024 05:30 )  PTT:30.9 sec  Urinalysis Basic - ( 14 Oct 2024 05:22 )    Color: x / Appearance: x / SG: x / pH: x  Gluc: 111 mg/dL / Ketone: x  / Bili: x / Urobili: x   Blood: x / Protein: x / Nitrite: x   Leuk Esterase: x / RBC: x / WBC x   Sq Epi: x / Non Sq Epi: x / Bacteria: x        RADIOLOGY & ADDITIONAL STUDIES:

## 2024-10-14 NOTE — PROCEDURAL SAFETY CHECKLIST WITH OR WITHOUT SEDATION - NSPOSTCOMMENTFT_GEN_ALL_CORE
Pt received the following medications at the following times in the following doses during the procedure:  Versed 4mg- 13:45  Nimbex 20mg- 13:49  Versed 4mg- 13:50  Versed 2mg- 14:17

## 2024-10-15 LAB
ANION GAP SERPL CALC-SCNC: 10 MMOL/L — SIGNIFICANT CHANGE UP (ref 5–17)
APPEARANCE UR: CLEAR — SIGNIFICANT CHANGE UP
BACTERIA # UR AUTO: NEGATIVE /HPF — SIGNIFICANT CHANGE UP
BILIRUB UR-MCNC: NEGATIVE — SIGNIFICANT CHANGE UP
BUN SERPL-MCNC: 44 MG/DL — HIGH (ref 7–23)
CALCIUM SERPL-MCNC: 8.8 MG/DL — SIGNIFICANT CHANGE UP (ref 8.4–10.5)
CAST: 5 /LPF — HIGH (ref 0–4)
CHLORIDE SERPL-SCNC: 104 MMOL/L — SIGNIFICANT CHANGE UP (ref 96–108)
CO2 SERPL-SCNC: 20 MMOL/L — LOW (ref 22–31)
COLOR SPEC: YELLOW — SIGNIFICANT CHANGE UP
CREAT SERPL-MCNC: 3.27 MG/DL — HIGH (ref 0.5–1.3)
DIFF PNL FLD: NEGATIVE — SIGNIFICANT CHANGE UP
EGFR: 23 ML/MIN/1.73M2 — LOW
EGFR: 23 ML/MIN/1.73M2 — LOW
GAS PNL BLDA: SIGNIFICANT CHANGE UP
GLUCOSE BLDC GLUCOMTR-MCNC: 101 MG/DL — HIGH (ref 70–99)
GLUCOSE BLDC GLUCOMTR-MCNC: 116 MG/DL — HIGH (ref 70–99)
GLUCOSE BLDC GLUCOMTR-MCNC: 121 MG/DL — HIGH (ref 70–99)
GLUCOSE BLDC GLUCOMTR-MCNC: 124 MG/DL — HIGH (ref 70–99)
GLUCOSE BLDC GLUCOMTR-MCNC: 127 MG/DL — HIGH (ref 70–99)
GLUCOSE SERPL-MCNC: 113 MG/DL — HIGH (ref 70–99)
GLUCOSE UR QL: NEGATIVE MG/DL — SIGNIFICANT CHANGE UP
HCT VFR BLD CALC: 35.3 % — LOW (ref 39–50)
HGB BLD-MCNC: 11.4 G/DL — LOW (ref 13–17)
KETONES UR-MCNC: NEGATIVE MG/DL — SIGNIFICANT CHANGE UP
LEUKOCYTE ESTERASE UR-ACNC: NEGATIVE — SIGNIFICANT CHANGE UP
MAGNESIUM SERPL-MCNC: 2.5 MG/DL — SIGNIFICANT CHANGE UP (ref 1.6–2.6)
MCHC RBC-ENTMCNC: 28.3 PG — SIGNIFICANT CHANGE UP (ref 27–34)
MCHC RBC-ENTMCNC: 32.3 GM/DL — SIGNIFICANT CHANGE UP (ref 32–36)
MCV RBC AUTO: 87.6 FL — SIGNIFICANT CHANGE UP (ref 80–100)
NITRITE UR-MCNC: NEGATIVE — SIGNIFICANT CHANGE UP
NRBC # BLD: 0 /100 WBCS — SIGNIFICANT CHANGE UP (ref 0–0)
NRBC BLD-RTO: 0 /100 WBCS — SIGNIFICANT CHANGE UP (ref 0–0)
PH UR: 5.5 — SIGNIFICANT CHANGE UP (ref 5–8)
PHOSPHATE SERPL-MCNC: 5.3 MG/DL — HIGH (ref 2.5–4.5)
PLATELET # BLD AUTO: 367 K/UL — SIGNIFICANT CHANGE UP (ref 150–400)
POTASSIUM SERPL-MCNC: 5.1 MMOL/L — SIGNIFICANT CHANGE UP (ref 3.5–5.3)
POTASSIUM SERPL-SCNC: 5.1 MMOL/L — SIGNIFICANT CHANGE UP (ref 3.5–5.3)
PROT UR-MCNC: 300 MG/DL
RBC # BLD: 4.03 M/UL — LOW (ref 4.2–5.8)
RBC # FLD: 12.4 % — SIGNIFICANT CHANGE UP (ref 10.3–14.5)
RBC CASTS # UR COMP ASSIST: 1 /HPF — SIGNIFICANT CHANGE UP (ref 0–4)
SODIUM SERPL-SCNC: 134 MMOL/L — LOW (ref 135–145)
SP GR SPEC: 1.02 — SIGNIFICANT CHANGE UP (ref 1–1.03)
SQUAMOUS # UR AUTO: 1 /HPF — SIGNIFICANT CHANGE UP (ref 0–5)
UROBILINOGEN FLD QL: 0.2 MG/DL — SIGNIFICANT CHANGE UP (ref 0.2–1)
WBC # BLD: 12.29 K/UL — HIGH (ref 3.8–10.5)
WBC # FLD AUTO: 12.29 K/UL — HIGH (ref 3.8–10.5)
WBC UR QL: 2 /HPF — SIGNIFICANT CHANGE UP (ref 0–5)

## 2024-10-15 PROCEDURE — 99232 SBSQ HOSP IP/OBS MODERATE 35: CPT

## 2024-10-15 PROCEDURE — 71045 X-RAY EXAM CHEST 1 VIEW: CPT | Mod: 26

## 2024-10-15 PROCEDURE — 99291 CRITICAL CARE FIRST HOUR: CPT

## 2024-10-15 RX ORDER — HEPARIN SODIUM 1000 [USP'U]/ML
5000 INJECTION INTRAVENOUS; SUBCUTANEOUS EVERY 8 HOURS
Refills: 0 | Status: DISCONTINUED | OUTPATIENT
Start: 2024-10-15 | End: 2024-12-09

## 2024-10-15 RX ADMIN — Medication 667 MILLIGRAM(S): at 22:16

## 2024-10-15 RX ADMIN — Medication 80 MILLIGRAM(S): at 06:28

## 2024-10-15 RX ADMIN — AMLODIPINE BESYLATE 10 MILLIGRAM(S): 10 TABLET ORAL at 06:28

## 2024-10-15 RX ADMIN — Medication 50 MILLIGRAM(S): at 22:18

## 2024-10-15 RX ADMIN — IPRATROPIUM BROMIDE AND ALBUTEROL SULFATE 3 MILLILITER(S): .5; 2.5 SOLUTION RESPIRATORY (INHALATION) at 06:02

## 2024-10-15 RX ADMIN — Medication 2 TABLET(S): at 22:18

## 2024-10-15 RX ADMIN — Medication 1 APPLICATION(S): at 06:29

## 2024-10-15 RX ADMIN — Medication 40 MILLIGRAM(S): at 12:09

## 2024-10-15 RX ADMIN — ACETYLCYSTEINE 4 MILLILITER(S): 200 INHALANT RESPIRATORY (INHALATION) at 17:08

## 2024-10-15 RX ADMIN — Medication 667 MILLIGRAM(S): at 12:08

## 2024-10-15 RX ADMIN — IPRATROPIUM BROMIDE AND ALBUTEROL SULFATE 3 MILLILITER(S): .5; 2.5 SOLUTION RESPIRATORY (INHALATION) at 17:08

## 2024-10-15 RX ADMIN — Medication 80 MILLIGRAM(S): at 23:24

## 2024-10-15 RX ADMIN — LABETALOL HYDROCHLORIDE 300 MILLIGRAM(S): 200 TABLET, FILM COATED ORAL at 06:28

## 2024-10-15 RX ADMIN — Medication 50 MILLIGRAM(S): at 12:09

## 2024-10-15 RX ADMIN — TAMSULOSIN HYDROCHLORIDE 0.8 MILLIGRAM(S): 0.4 CAPSULE ORAL at 22:18

## 2024-10-15 RX ADMIN — Medication 80 MILLIGRAM(S): at 12:08

## 2024-10-15 RX ADMIN — HEPARIN SODIUM 5000 UNIT(S): 1000 INJECTION INTRAVENOUS; SUBCUTANEOUS at 22:17

## 2024-10-15 RX ADMIN — ACETYLCYSTEINE 4 MILLILITER(S): 200 INHALANT RESPIRATORY (INHALATION) at 06:03

## 2024-10-15 RX ADMIN — Medication 50 MILLIGRAM(S): at 06:28

## 2024-10-15 RX ADMIN — Medication 650 MILLIGRAM(S): at 15:50

## 2024-10-15 RX ADMIN — Medication 80 MILLIGRAM(S): at 06:23

## 2024-10-15 RX ADMIN — HEPARIN SODIUM 5000 UNIT(S): 1000 INJECTION INTRAVENOUS; SUBCUTANEOUS at 12:09

## 2024-10-15 RX ADMIN — Medication 1 APPLICATION(S): at 18:51

## 2024-10-15 RX ADMIN — LABETALOL HYDROCHLORIDE 300 MILLIGRAM(S): 200 TABLET, FILM COATED ORAL at 22:16

## 2024-10-15 RX ADMIN — Medication 80 MILLIGRAM(S): at 18:49

## 2024-10-15 RX ADMIN — Medication 650 MILLIGRAM(S): at 15:17

## 2024-10-15 RX ADMIN — LABETALOL HYDROCHLORIDE 300 MILLIGRAM(S): 200 TABLET, FILM COATED ORAL at 12:08

## 2024-10-15 NOTE — PROGRESS NOTE ADULT - ASSESSMENT
Assessment: 44m unknown pmhx admit for brain stem hematoma 2/2 HTN    NEURO:  ICH score 3   -neuro check q4  -PT/OT evaluation if signs of responsiveness   -MRI brain negative for cavernoma; Cerebeller infarct     PULMONARY:  Currently requiring mechanical ventilation for air-way protection; change to CPAP as tolerated   tracheostomy care per RN protocol     CARDIOVASCULAR:  monitor on telemetry   vitals q1  sbp goal 100-160  -c/w amlodipine 10mg, labetalol 300mg TID, hydralazine 50mg TID   TTE w/ hypertensive changes     GASTROENTEROLOGY:  NPO after midnight for PEG placement  GI ppx : Protonix 40mg qd      RENAL/:  ANIKA on likely CKD ; downtrending Cr -  -c/w phoslo; avoid nephrotoxic medications   -check BMP qd  -strict i/o's ;gabriel placed in AM 10/14 doxazosin to 0.8mg   -Na goal 135-145; d/c free water & IVF     ENDOCRINE:  A1c- 5.3    HEME/ONC:  DVT ppx: SQH ; hold heparin SQ 10/14 for trach placement   b/l SCDs    INFECTIOUS:   Ancef to continue for MSSA PNA - end date 10/14    Code statues: full code, ongoing GOC w/ palliative

## 2024-10-15 NOTE — PROGRESS NOTE ADULT - SUBJECTIVE AND OBJECTIVE BOX
[facility] ADDITIONAL [facility]GRESS NOTE    Nighttime rounds were performed     Patient is a 45 y/o male with unknown past medical history (reported stroke and MI by coworkers). He presented to [facility] with AMS. Prior to this, pt was working at [email or web address] when he was found down by coworkers. EMS called and pt brought to [facility] ED. Intubated, sedated, started on cardene for SBPs in 200s. CT head showed pontine bleed. Transferred to NSICU for further management.  (30 Sep 2024 12:55)      ICU Vital Signs Last 24 Hrs  T(C): 38.4 (15 Oct 2024 17:54), Max: 38.4 (15 Oct 2024 17:54)  T(F): 101.1 (15 Oct 2024 17:54), Max: 101.1 (15 Oct 2024 17:54)  HR: 87 (15 Oct 2024 19:00) (76 - 100)  ABP: 130/70 (15 Oct 2024 19:00) (117/63 - 153/85)  ABP(mean): 89 (15 Oct 2024 19:00) (82 - 110)  RR: 16 (15 Oct 2024 19:00) (10 - 19)  SpO2: 94% (15 Oct 2024 19:00) (94% - 99%)      10-14-24 @ 07:01  -  10-15-24 @ 07:00  --------------------------------------------------------  IN: 1092.4 mL / OUT: 2395 mL / NET: -1302.6 mL    10-15-24 @ 07:01  -  10-15-24 @ 19:33  --------------------------------------------------------  IN: 510 mL / OUT: 1110 mL / NET: -600 mL      Mode: CPAP with PS, FiO2: 40, PEEP: 8, PS: 5, MAP: 9.4, PIP: 14    PHYSICAL EXAM:  Neuro: Dysconjugate gaze, absent R corneal reflex, L present, does not doll to movement of head, strong cough,   RUE extension, LUE extension, b/l  LE TFs  Pulm: Diminshed  Cards: S1, S2, RRR  GI: Soft, Nontender, Nondistended   Extremities: No LE edema       MEDICATIONS:  acetaminophen   Oral Liquid .. 650 milliGRAM(s) Oral every 6 hours PRN  acetylcysteine 10%  Inhalation 4 milliLiter(s) Inhalation every 12 hours  albuterol/ipratropium for Nebulization 3 milliLiter(s) Nebulizer every 12 hours  amLODIPine   Tablet 10 milliGRAM(s) Oral daily  calcium acetate 667 milliGRAM(s) Oral three times a day with meals  chlorhexidine 2% Cloths 1 Application(s) Topical <User Schedule>  heparin   Injectable 5000 Unit(s) SubCutaneous every 8 hours  hydrALAZINE 50 milliGRAM(s) Oral every 8 hours  insulin lispro (ADMELOG) corrective regimen sliding scale   SubCutaneous every 6 hours  labetalol 300 milliGRAM(s) Enteral Tube every 8 hours  oxyCODONE    IR 5 milliGRAM(s) Oral every 4 hours PRN  pantoprazole  Injectable 40 milliGRAM(s) IV Push daily  petrolatum Ophthalmic Ointment 1 Application(s) Both EYES two times a day  senna 2 Tablet(s) Oral at bedtime  simethicone 80 milliGRAM(s) Chew every 6 hours  sodium chloride 0.9%. 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 CPAP as tolerated  Pulm toilet    CV:  SBP goal 100-160  Labetalol, hydralazine, amlodipine    RENAL: CKD  Na goal 135-145;   Vuong placed in AM 10/14. Doxazosin 0.8mg   Nephrology following    GI:  Diet: Resume TFs  GI prophylaxis [] not indicated [x] PPI [] other:  Bowel regimen [] colace [x] senna [] other: miralax  LBM: 10/12    ENDO:   Goal euglycemia (-180)  A1c: 5.3    HEME/ONC:  VTE prophylaxis: [x] SCDs [x] chemoprophylaxis held for trach    ID:   S/P Ancef  Cultures: **    Patient remains critically ill.    Additional 45 minutes of critical care time.                 [facility]DITIONAL [facility]GRESS [facility]    Nighttime rounds were performed     Patient is a 45 y/o male with unknown past medical history (reported stroke and MI by coworkers). He presented to [facility] with AMS. Prior to this, pt was working at [employer] when he was found down by coworkers. EMS called and pt brought to [facility] ED. Intubated, sedated, started on cardene for SBPs in 200s. CT head showed pontine bleed. Transferred to NSICU for further management.  (30 Sep 2024 12:55)      ICU Vital Signs Last 24 Hrs  T(C): 38.4 (15 Oct 2024 17:54), Max: 38.4 (15 Oct 2024 17:54)  T(F): 101.1 (15 Oct 2024 17:54), Max: 101.1 (15 Oct 2024 17:54)  HR: 87 (15 Oct 2024 19:00) (76 - 100)  ABP: 130/70 (15 Oct 2024 19:00) (117/63 - 153/85)  ABP(mean): 89 (15 Oct 2024 19:00) (82 - 110)  RR: 16 (15 Oct 2024 19:00) (10 - 19)  SpO2: 94% (15 Oct 2024 19:00) (94% - 99%)      10-14-24 @ 07:01  -  10-15-24 @ 07:00  --------------------------------------------------------  IN: 1092.4 mL / OUT: 2395 mL / NET: -1302.6 mL    10-15-24 @ 07:01  -  10-15-24 @ 19:33  --------------------------------------------------------  IN: 510 mL / OUT: 1110 mL / NET: -600 mL      Mode: CPAP with PS, FiO2: 40, PEEP: 8, PS: 5, MAP: 9.4, PIP: 14    PHYSICAL EXAM:  Neuro: Dysconjugate gaze, absent R corneal reflex, L present, does not doll to movement of head, strong cough,   RUE extension, LUE extension, b/l  LE TFs  Pulm: Diminshed  Cards: S1, S2, RRR  GI: Soft, Nontender, Nondistended   Extremities: No LE edema     MEDICATIONS:  acetaminophen   Oral Liquid .. 650 milliGRAM(s) Oral every 6 hours PRN  acetylcysteine 10%  Inhalation 4 milliLiter(s) Inhalation every 12 hours  albuterol/ipratropium for Nebulization 3 milliLiter(s) Nebulizer every 12 hours  amLODIPine   Tablet 10 milliGRAM(s) Oral daily  calcium acetate 667 milliGRAM(s) Oral three times a day with meals  chlorhexidine 2% Cloths 1 Application(s) Topical <User Schedule>  heparin   Injectable 5000 Unit(s) SubCutaneous every 8 hours  hydrALAZINE 50 milliGRAM(s) Oral every 8 hours  insulin lispro (ADMELOG) corrective regimen sliding scale   SubCutaneous every 6 hours  labetalol 300 milliGRAM(s) Enteral Tube every 8 hours  oxyCODONE    IR 5 milliGRAM(s) Oral every 4 hours PRN  pantoprazole  Injectable 40 milliGRAM(s) IV Push daily  petrolatum Ophthalmic Ointment 1 Application(s) Both EYES two times a day  senna 2 Tablet(s) Oral at bedtime  simethicone 80 milliGRAM(s) Chew every 6 hours  sodium chloride 0.9%. 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 CPAP as tolerated  Pulm toilet    CV:  SBP goal 100-160  Labetalol, hydralazine, amlodipine    RENAL: CKD (mild superimposed ANIKA)  Na goal 135-145  Vuong placed in AM 10/14.   Doxazosin 0.8mg   IVF while NPO. Account for insensible losses  Nephrology following    GI:  Diet: NPO for PEG  GI prophylaxis [] not indicated [x] PPI [] other:  Bowel regimen [] colace [x] senna [] other: miralax  LBM: 10/12    ENDO:   Goal euglycemia (-180)  A1c: 5.3    HEME/ONC:  VTE prophylaxis: [x] SCDs [x] chemoprophylaxis held for trach    ID: Febrile  S/P Ancef  Cultures: blood and sputum pending  Procalcitonin    Patient remains critically ill.    Additional 45 minutes of critical care time.

## 2024-10-15 NOTE — PROGRESS NOTE ADULT - SUBJECTIVE AND OBJECTIVE BOX
STATUS POST:  Percutaneous tracheal puncture    SUBJECTIVE: Patient seen and examined on AM rounds. Patient is s/p trach placement. Patient is off sedation non-interactive. Does not follow commands.      Vital Signs Last 24 Hrs  T(C): 36.7 (15 Oct 2024 06:04), Max: 36.7 (15 Oct 2024 06:04)  T(F): 98 (15 Oct 2024 06:04), Max: 98 (15 Oct 2024 06:04)  HR: 96 (15 Oct 2024 10:00) (71 - 97)  BP: --  BP(mean): --  RR: 12 (15 Oct 2024 10:00) (9 - 21)  SpO2: 97% (15 Oct 2024 10:00) (93% - 100%)    Parameters below as of 15 Oct 2024 10:00  Patient On (Oxygen Delivery Method): ventilator, CPAP    O2 Concentration (%): 40    I&O's Summary    14 Oct 2024 07:01  -  15 Oct 2024 07:00  --------------------------------------------------------  IN: 1092.4 mL / OUT: 2395 mL / NET: -1302.6 mL    15 Oct 2024 07:01  -  15 Oct 2024 11:20  --------------------------------------------------------  IN: 0 mL / OUT: 500 mL / NET: -500 mL        Physical Exam:  General Appearance: Noninteractive, NAD  Pulmonary: on MV, no respiratory distress, symmetric chest rise  Abdomen: Soft, nondistended, unable to assess ttp due to patient condition  Extremities: WWP, SCD's in place    LABS:                        11.4   12.29 )-----------( 367      ( 15 Oct 2024 05:30 )             35.3     10-15    134[L]  |  104  |  44[H]  ----------------------------<  113[H]  5.1   |  20[L]  |  3.27[H]    Ca    8.8      15 Oct 2024 05:30  Phos  5.3     10-15  Mg     2.5     10-15        Urinalysis Basic - ( 15 Oct 2024 05:30 )    Color: x / Appearance: x / SG: x / pH: x  Gluc: 113 mg/dL / Ketone: x  / Bili: x / Urobili: x   Blood: x / Protein: x / Nitrite: x   Leuk Esterase: x / RBC: x / WBC x   Sq Epi: x / Non Sq Epi: x / Bacteria: x

## 2024-10-15 NOTE — PROGRESS NOTE ADULT - SUBJECTIVE AND OBJECTIVE BOX
INTERVAL HISTORY: HPI:  Unknown age male, unknown past medical history (reported stroke and MI by coworkers) presented to [facility] with AMS, Pt was working at [employer] when he was found down by coworkers. EMS called and pt brought to [facility] ED. Intubated, sedated, started on cardene for SBPs in 200s. CT head showed brain stem bleed. Transferred to NSICU for further management.  (30 Sep 2024 12:55)    REVIEW OF SYSTEMS: [ ] Unable to Assess due to neurologic exam   [ ] All ROS addressed below are non-contributory, except:  Neuro: [ ] Headache [ ] Back pain [ ] Numbness [ ] Weakness [ ] Ataxia [ ] Dizziness [ ] Aphasia [ ] Dysarthria [ ] Visual disturbance  Resp: [ ] Shortness of breath/dyspnea, [ ] Orthopnea [ ] Cough  CV: [ ] Chest pain [ ] Palpitation [ ] Lightheadedness [ ] Syncope  Renal: [ ] Thirst [ ] Edema  GI: [ ] Nausea [ ] Emesis [ ] Abdominal pain [ ] Constipation [ ] Diarrhea  Hem: [ ] Hematemesis [ ] bright red blood per rectum  ID: [ ] Fever [ ] Chills [ ] Dysuria  ENT: [ ] Rhinorrhea    PHYSICAL EXAM:    General: trach in place   Neurological: dysconjugate gaze, not following commands  absent R corneal reflex, L is still present, does not doll to movement of head, strong cough, able to maintain spontaneous breaths on CPAP, RUE extension, LUE 0/5, b/l LE WD vs TF  to nox,  Pulmonary: Clear to Auscultation, No Rales, No Rhonchi, No Wheezes   Cardiovascular: S1, S2, Regular Rate and Rhythm   Gastrointestinal: Soft, Nontender, Nondistended   Extremities: No LE edema       ICU Vital Signs Last 24 Hrs  T(C): 36.7 (15 Oct 2024 06:04), Max: 36.7 (15 Oct 2024 06:04)  T(F): 98 (15 Oct 2024 06:04), Max: 98 (15 Oct 2024 06:04)  HR: 90 (15 Oct 2024 09:34) (71 - 97)  ABP: 117/63 (15 Oct 2024 08:00) (87/66 - 146/78)  ABP(mean): 82 (15 Oct 2024 08:00) (75 - 103)  RR: 17 (15 Oct 2024 08:00) (9 - 21)  SpO2: 98% (15 Oct 2024 09:34) (93% - 100%)      10-14-24 @ 07:01  -  10-15-24 @ 07:00  --------------------------------------------------------  IN: 1092.4 mL / OUT: 2395 mL / NET: -1302.6 mL    10-15-24 @ 07:01  -  10-15-24 @ 10:39  --------------------------------------------------------  IN: 0 mL / OUT: 100 mL / NET: -100 mL        Mode: AC/ CMV (Assist Control/ Continuous Mandatory Ventilation), RR (machine): 12, TV (machine): 430, FiO2: 40, PEEP: 5, ITime: 1.2, MAP: 8.9, PC: , PIP: 12    acetaminophen   Oral Liquid .. 650 milliGRAM(s) Oral every 6 hours PRN  acetylcysteine 10%  Inhalation 4 milliLiter(s) Inhalation every 12 hours  albuterol/ipratropium for Nebulization 3 milliLiter(s) Nebulizer every 12 hours  amLODIPine   Tablet 10 milliGRAM(s) Oral daily  calcium acetate 667 milliGRAM(s) Oral three times a day with meals  chlorhexidine 2% Cloths 1 Application(s) Topical <User Schedule>  heparin   Injectable 5000 Unit(s) SubCutaneous every 8 hours  hydrALAZINE 50 milliGRAM(s) Oral every 8 hours  insulin lispro (ADMELOG) corrective regimen sliding scale   SubCutaneous every 6 hours  labetalol 300 milliGRAM(s) Enteral Tube every 8 hours  oxyCODONE    IR 5 milliGRAM(s) Oral every 4 hours PRN  pantoprazole  Injectable 40 milliGRAM(s) IV Push daily  petrolatum Ophthalmic Ointment 1 Application(s) Both EYES two times a day  senna 2 Tablet(s) Oral at bedtime  simethicone 80 milliGRAM(s) Chew every 6 hours  sodium chloride 0.9%. 1000 milliLiter(s) (70 mL/Hr) IV Continuous <Continuous>  sodium chloride 0.9%. 1000 milliLiter(s) (80 mL/Hr) IV Continuous <Continuous>  tamsulosin 0.8 milliGRAM(s) Oral at bedtime      LABS:  Na: 134 (10-15 @ 05:30), 134 (10-14 @ 05:22), 135 (10-13 @ 09:23)  K: 5.1 (10-15 @ 05:30), 4.4 (10-14 @ 05:22), 4.5 (10-13 @ 09:23)  Cl: 104 (10-15 @ 05:30), 102 (10-14 @ 05:22), 103 (10-13 @ 09:23)  CO2: 20 (10-15 @ 05:30), 20 (10-14 @ 05:22), 21 (10-13 @ 09:23)  BUN: 44 (10-15 @ 05:30), 38 (10-14 @ 05:22), 36 (10-13 @ 09:23)  Cr: 3.27 (10-15 @ 05:30), 3.10 (10-14 @ 05:22), 3.05 (10-13 @ 09:23)  Glu: 113(10-15 @ 05:30), 111(10-14 @ 05:22), 96(10-13 @ 09:23)    Hgb: 11.4 (10-15 @ 05:30), 11.3 (10-14 @ 05:22), 10.3 (10-13 @ 05:30)  Hct: 35.3 (10-15 @ 05:30), 34.5 (10-14 @ 05:22), 32.7 (10-13 @ 05:30)  WBC: 12.29 (10-15 @ 05:30), 11.07 (10-14 @ 05:22), 7.94 (10-13 @ 05:30)  Plt: 367 (10-15 @ 05:30), 318 (10-14 @ 05:22), 287 (10-13 @ 05:30)    INR: 0.99 10-13-24 @ 05:30  PTT: 30.9 10-13-24 @ 05:30      ABG - ( 14 Oct 2024 17:42 )  pH, Arterial: 7.34  pH, Blood: x     /  pCO2: 40    /  pO2: 83    / HCO3: 22    / Base Excess: -3.9  /  SaO2: 97.0

## 2024-10-15 NOTE — PROGRESS NOTE ADULT - ASSESSMENT
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 Patient does not have a prior hx of abdominal surgeries, is not currently on any anticoagulation and is only on SQH. Patients son had a long discussion with palliative care regarding goals of care, is understanding the chance of full recovery is poor, patient's son (SDM) would like to proceed with any measures to prolong his life which includes PEG and trach.     PLAN:  Plan to place PEG 10/16 with Dr. Trinh   S/p Tracheostomy on 10/14 with Pulmonary  NPO at Midnight  Pre-op labs including T/S and Coags  Rest of the care as per primary team    General surgery team 4c will continue to follow

## 2024-10-16 LAB
ANION GAP SERPL CALC-SCNC: 12 MMOL/L — SIGNIFICANT CHANGE UP (ref 5–17)
ANION GAP SERPL CALC-SCNC: 9 MMOL/L — SIGNIFICANT CHANGE UP (ref 5–17)
APTT BLD: 32 SEC — SIGNIFICANT CHANGE UP (ref 24.5–35.6)
BUN SERPL-MCNC: 50 MG/DL — HIGH (ref 7–23)
BUN SERPL-MCNC: 54 MG/DL — HIGH (ref 7–23)
CALCIUM SERPL-MCNC: 8.2 MG/DL — LOW (ref 8.4–10.5)
CALCIUM SERPL-MCNC: 8.8 MG/DL — SIGNIFICANT CHANGE UP (ref 8.4–10.5)
CHLORIDE SERPL-SCNC: 102 MMOL/L — SIGNIFICANT CHANGE UP (ref 96–108)
CHLORIDE SERPL-SCNC: 104 MMOL/L — SIGNIFICANT CHANGE UP (ref 96–108)
CO2 SERPL-SCNC: 19 MMOL/L — LOW (ref 22–31)
CO2 SERPL-SCNC: 19 MMOL/L — LOW (ref 22–31)
CREAT ?TM UR-MCNC: 91 MG/DL — SIGNIFICANT CHANGE UP
CREAT SERPL-MCNC: 3.51 MG/DL — HIGH (ref 0.5–1.3)
CREAT SERPL-MCNC: 3.51 MG/DL — HIGH (ref 0.5–1.3)
CREAT SERPL-MCNC: 3.58 MG/DL — HIGH (ref 0.5–1.3)
EGFR: 20 ML/MIN/1.73M2 — LOW
EGFR: 20 ML/MIN/1.73M2 — LOW
EGFR: 21 ML/MIN/1.73M2 — LOW
GLUCOSE BLDC GLUCOMTR-MCNC: 100 MG/DL — HIGH (ref 70–99)
GLUCOSE BLDC GLUCOMTR-MCNC: 122 MG/DL — HIGH (ref 70–99)
GLUCOSE SERPL-MCNC: 116 MG/DL — HIGH (ref 70–99)
GLUCOSE SERPL-MCNC: 125 MG/DL — HIGH (ref 70–99)
GRAM STN FLD: ABNORMAL
HCT VFR BLD CALC: 35.6 % — LOW (ref 39–50)
HGB BLD-MCNC: 11.4 G/DL — LOW (ref 13–17)
INR BLD: 1.12 — SIGNIFICANT CHANGE UP (ref 0.85–1.16)
MAGNESIUM SERPL-MCNC: 2.5 MG/DL — SIGNIFICANT CHANGE UP (ref 1.6–2.6)
MCHC RBC-ENTMCNC: 27.9 PG — SIGNIFICANT CHANGE UP (ref 27–34)
MCHC RBC-ENTMCNC: 32 GM/DL — SIGNIFICANT CHANGE UP (ref 32–36)
MCV RBC AUTO: 87.3 FL — SIGNIFICANT CHANGE UP (ref 80–100)
NRBC # BLD: 0 /100 WBCS — SIGNIFICANT CHANGE UP (ref 0–0)
NRBC BLD-RTO: 0 /100 WBCS — SIGNIFICANT CHANGE UP (ref 0–0)
PHOSPHATE SERPL-MCNC: 5.4 MG/DL — HIGH (ref 2.5–4.5)
PLATELET # BLD AUTO: 385 K/UL — SIGNIFICANT CHANGE UP (ref 150–400)
POTASSIUM SERPL-MCNC: 4.9 MMOL/L — SIGNIFICANT CHANGE UP (ref 3.5–5.3)
POTASSIUM SERPL-MCNC: 5.4 MMOL/L — HIGH (ref 3.5–5.3)
POTASSIUM SERPL-SCNC: 4.9 MMOL/L — SIGNIFICANT CHANGE UP (ref 3.5–5.3)
POTASSIUM SERPL-SCNC: 5.4 MMOL/L — HIGH (ref 3.5–5.3)
PROCALCITONIN SERPL-MCNC: 0.36 NG/ML — HIGH (ref 0.02–0.1)
PROTHROM AB SERPL-ACNC: 12.9 SEC — SIGNIFICANT CHANGE UP (ref 9.9–13.4)
RBC # BLD: 4.08 M/UL — LOW (ref 4.2–5.8)
RBC # FLD: 12.6 % — SIGNIFICANT CHANGE UP (ref 10.3–14.5)
SODIUM SERPL-SCNC: 130 MMOL/L — LOW (ref 135–145)
SODIUM SERPL-SCNC: 132 MMOL/L — LOW (ref 135–145)
SODIUM SERPL-SCNC: 135 MMOL/L — SIGNIFICANT CHANGE UP (ref 135–145)
SODIUM UR-SCNC: 56 MMOL/L — SIGNIFICANT CHANGE UP
SPECIMEN SOURCE: SIGNIFICANT CHANGE UP
WBC # BLD: 13.85 K/UL — HIGH (ref 3.8–10.5)
WBC # FLD AUTO: 13.85 K/UL — HIGH (ref 3.8–10.5)

## 2024-10-16 PROCEDURE — 99291 CRITICAL CARE FIRST HOUR: CPT

## 2024-10-16 PROCEDURE — 71045 X-RAY EXAM CHEST 1 VIEW: CPT | Mod: 26

## 2024-10-16 RX ORDER — PIPERACILLIN-TAZO-DEXTROSE,ISO 3.375G/5
3.38 IV SOLUTION, PIGGYBACK PREMIX FROZEN(ML) INTRAVENOUS EVERY 8 HOURS
Refills: 0 | Status: DISCONTINUED | OUTPATIENT
Start: 2024-10-16 | End: 2024-10-18

## 2024-10-16 RX ORDER — PIPERACILLIN-TAZO-DEXTROSE,ISO 3.375G/5
3.38 IV SOLUTION, PIGGYBACK PREMIX FROZEN(ML) INTRAVENOUS ONCE
Refills: 0 | Status: COMPLETED | OUTPATIENT
Start: 2024-10-16 | End: 2024-10-16

## 2024-10-16 RX ORDER — POLYETHYLENE GLYCOL 3350 17 G/17G
17 POWDER, FOR SOLUTION ORAL
Refills: 0 | Status: DISCONTINUED | OUTPATIENT
Start: 2024-10-16 | End: 2024-10-21

## 2024-10-16 RX ORDER — SODIUM ZIRCONIUM CYCLOSILICATE 5 G/5G
5 POWDER, FOR SUSPENSION ORAL ONCE
Refills: 0 | Status: COMPLETED | OUTPATIENT
Start: 2024-10-16 | End: 2024-10-16

## 2024-10-16 RX ORDER — VANCOMYCIN HCL IN 5 % DEXTROSE 1.5G/250ML
1250 PLASTIC BAG, INJECTION (ML) INTRAVENOUS EVERY 24 HOURS
Refills: 0 | Status: DISCONTINUED | OUTPATIENT
Start: 2024-10-16 | End: 2024-10-17

## 2024-10-16 RX ORDER — VANCOMYCIN HCL IN 5 % DEXTROSE 1.5G/250ML
1250 PLASTIC BAG, INJECTION (ML) INTRAVENOUS ONCE
Refills: 0 | Status: COMPLETED | OUTPATIENT
Start: 2024-10-16 | End: 2024-10-16

## 2024-10-16 RX ADMIN — Medication 200 GRAM(S): at 08:55

## 2024-10-16 RX ADMIN — Medication 50 MILLIGRAM(S): at 06:09

## 2024-10-16 RX ADMIN — ACETYLCYSTEINE 4 MILLILITER(S): 200 INHALANT RESPIRATORY (INHALATION) at 05:53

## 2024-10-16 RX ADMIN — Medication 650 MILLIGRAM(S): at 01:00

## 2024-10-16 RX ADMIN — Medication 25 GRAM(S): at 21:59

## 2024-10-16 RX ADMIN — Medication 650 MILLIGRAM(S): at 19:52

## 2024-10-16 RX ADMIN — AMLODIPINE BESYLATE 10 MILLIGRAM(S): 10 TABLET ORAL at 06:08

## 2024-10-16 RX ADMIN — Medication 667 MILLIGRAM(S): at 21:59

## 2024-10-16 RX ADMIN — Medication 40 MILLIGRAM(S): at 11:39

## 2024-10-16 RX ADMIN — HEPARIN SODIUM 5000 UNIT(S): 1000 INJECTION INTRAVENOUS; SUBCUTANEOUS at 12:04

## 2024-10-16 RX ADMIN — LABETALOL HYDROCHLORIDE 300 MILLIGRAM(S): 200 TABLET, FILM COATED ORAL at 21:58

## 2024-10-16 RX ADMIN — Medication 667 MILLIGRAM(S): at 06:08

## 2024-10-16 RX ADMIN — Medication 80 MILLIGRAM(S): at 17:27

## 2024-10-16 RX ADMIN — IPRATROPIUM BROMIDE AND ALBUTEROL SULFATE 3 MILLILITER(S): .5; 2.5 SOLUTION RESPIRATORY (INHALATION) at 17:13

## 2024-10-16 RX ADMIN — Medication 650 MILLIGRAM(S): at 00:05

## 2024-10-16 RX ADMIN — LABETALOL HYDROCHLORIDE 300 MILLIGRAM(S): 200 TABLET, FILM COATED ORAL at 06:09

## 2024-10-16 RX ADMIN — Medication 166.67 MILLIGRAM(S): at 09:35

## 2024-10-16 RX ADMIN — Medication 650 MILLIGRAM(S): at 12:40

## 2024-10-16 RX ADMIN — Medication 80 MILLIGRAM(S): at 06:09

## 2024-10-16 RX ADMIN — Medication 2 TABLET(S): at 21:59

## 2024-10-16 RX ADMIN — POLYETHYLENE GLYCOL 3350 17 GRAM(S): 17 POWDER, FOR SOLUTION ORAL at 06:11

## 2024-10-16 RX ADMIN — POLYETHYLENE GLYCOL 3350 17 GRAM(S): 17 POWDER, FOR SOLUTION ORAL at 17:27

## 2024-10-16 RX ADMIN — HEPARIN SODIUM 5000 UNIT(S): 1000 INJECTION INTRAVENOUS; SUBCUTANEOUS at 06:09

## 2024-10-16 RX ADMIN — Medication 1 APPLICATION(S): at 06:00

## 2024-10-16 RX ADMIN — Medication 1 APPLICATION(S): at 19:53

## 2024-10-16 RX ADMIN — HEPARIN SODIUM 5000 UNIT(S): 1000 INJECTION INTRAVENOUS; SUBCUTANEOUS at 21:58

## 2024-10-16 RX ADMIN — SODIUM ZIRCONIUM CYCLOSILICATE 5 GRAM(S): 5 POWDER, FOR SUSPENSION ORAL at 10:51

## 2024-10-16 RX ADMIN — Medication 25 GRAM(S): at 12:06

## 2024-10-16 RX ADMIN — IPRATROPIUM BROMIDE AND ALBUTEROL SULFATE 3 MILLILITER(S): .5; 2.5 SOLUTION RESPIRATORY (INHALATION) at 05:52

## 2024-10-16 RX ADMIN — TAMSULOSIN HYDROCHLORIDE 0.8 MILLIGRAM(S): 0.4 CAPSULE ORAL at 21:59

## 2024-10-16 RX ADMIN — Medication 650 MILLIGRAM(S): at 20:30

## 2024-10-16 RX ADMIN — Medication 80 MILLIGRAM(S): at 11:39

## 2024-10-16 RX ADMIN — Medication 1 APPLICATION(S): at 06:09

## 2024-10-16 RX ADMIN — Medication 100 MILLILITER(S): at 08:48

## 2024-10-16 RX ADMIN — Medication 667 MILLIGRAM(S): at 12:04

## 2024-10-16 RX ADMIN — ACETYLCYSTEINE 4 MILLILITER(S): 200 INHALANT RESPIRATORY (INHALATION) at 17:13

## 2024-10-16 RX ADMIN — Medication 650 MILLIGRAM(S): at 11:40

## 2024-10-16 NOTE — PROGRESS NOTE ADULT - ASSESSMENT
43 y/o PMH ?stroke/MI present to [facility] after collapsing at work. Decorticate posturing, vomiting, intubated for airway protection. 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 Transferred to [facility] for further management. s/p trach 10/14.     Neuro:  - Neuro q4/vitals q1  - pain control: Tylenol prn   - CTH 9/30: enlarged pontine hemorrhage, CTH 10/3 done read stable   - vEEG (10/3-4)- negative  - stroke core measures, stroke neuro following  - MRI brain w/ w/o contrast 10/12: parenchymal hemorrhage, acute/subacute R cerebellar stroke      CV:  - SBP <160  - HTN: amlodipine 10mg qd, labetalol 300mg q8h, hydralazine 50mg q8h  - echo (9/30) EF 75%    Resp:  - trach to vent, CPAP as tolerated  - duonebs/mucomyst q12    GI:  - TF via NGT, NPO after midnight for PEG  - bowel reg held for diarrhea, RT (10/5-10/12)   - GI ppx: Protonix while intubated  - Abd distension: Simethicone 80mg q6    Renal:  - IVL  - Hyperphosphatemia: phoslo TID  - Urinary retenion: Flomax 0.8 mg at bedtime   - Voung (10/14- ) iso urinary retention   - CKD: trend Cr   - renal US 10/1: echogenicity c/w chronic med renal dz, repeat done 10/8: inc renal echogeicity, c/f medical renal disease w increased hydro     Endo:  - ISS, A1c 5.4    Heme:  - DVT ppx: SCDs, SQH 5000q8     ID:  - last pan cx 10/15  - Febrile, last pan cx 10/15, tx empiric PNA, vanc 1250 q 24. zosyn  - MRSA swab negative, sputum MSSA + , s/p 1 dose vanc (10/2), CTX (10/2 - 10/4), Ancef (10/4-10/14)     Dispo: NSICU, full code, pending PT/OT

## 2024-10-16 NOTE — PROGRESS NOTE ADULT - SUBJECTIVE AND OBJECTIVE BOX
INTERVAL HISTORY: HPI:  Unknown age male, unknown past medical history (reported stroke and MI by coworkers) presented to [facility] with AMS, Pt was working at [employer] when he was found down by coworkers. EMS called and pt brought to [facility] ED. Intubated, sedated, started on cardene for SBPs in 200s. CT head showed brain stem bleed. Transferred to NSICU for further management.  (30 Sep 2024 12:55)    NeuroICU Course/Interval Events  9/30: transferred from [facility]. A line placed. Versed dc'd. Cy Rader at bedside, states pt has family in [place], cannot confirm medications or PMH other than stroke and MI. 250cc bolus 3% given. LR switched to NS. hydralazine 25q8 started, 3% started, switched propofol to precedex   10/1: stability CTH done. Added labetalol, started TF. Palliative consulted. ethics consulted to determine surrogate. febrile 103, pan cx sent  10/2: BD 2, GEORGE overnight. TF resumed. Desatt'd to 80s, FiO2 inc. to 50. Fentanyl given, ABG, CXR ordered. Maxxed on precedex, started on propofol for DARIEN -4 - -5. Precedex dc'd. Duonebs, mucomyst, hypertonic added. 3% dc'd. Cardene dc'd. Start vanc/CTX. Increased labetalol 200q8. MRSA negative, dc'd vanc. ETT pulled back 2cm x 2, good positioning after confirmatory chest xray. Ethics attempting to establish HCP with family. Na 159, starting FW 250q6 for range 150-155.   10/3: BD3, GEORGE o/n, neuro stable. Na elevating, FW increased to 300q6. Dc'd bowel reg for diarrhea. vEEG started. SQH 5000q8 tonight.   10/4: BD 4, albumn bolus, incr. LR to 80 2/2 incr. in Cr, LR to 100cc/hr for uptrending Cr. Started 7% hypersal for 48hrs and SL atropine for inline/oral thick secretions. Dc'd CTX and started ancef for MSSA in the sputum. Nephrology consulted for CKD, f/u recs. SBP 170s, given hydralazine 10mg IVP.   10/5: BD5, o/n 10mg IVP hydralazine given for SBP 170s and started on hydralazine 25q8 via OGT. 10mg IV push labetalol for SBP > 160s. RT placed for diarrhea.   10/6: BD6, o/n FW increased to 350q4 per nephrology recs. IV tylenol for temp 100.6, SBp 160s presumed uncomfortable.   10/7: BD7, overnight pancultured for temp 101.8F.   10/8: BD8. GEORGE. Cr bumped. decreased LR to 75cc/hr. Adding simethicone ATC. incr hydralazine 50mgTID. Incr labetalol 300mgTID. Na 145, decreased FWF to 250q6. Start precedex. FENa consistent with intrinsic kidney injury. Pend repeat renal US. Retaining up to 1.3L, bladder scans q6, straight cath PRN  10/9: BD 9. GEORGE overnight. Neuro stable. abd xray for distention w non-specific gas pattern, OGT to LIWS for morning. duonebs/mucomyst to q8 for improving secretions. Changed tube feeds to Jevity 1.5 20cc/hr, low rate due to abdominal distention, nepro dense and more difficult to digest. Tolerating CPAP, confirmed by ABG.   10/10: BD 10. GEORGE overnight. Neuro stable. (+) gabriel for urinary retention on bladder scan. inc TF to goal rate of 40cc/hr. family leaning toward pursuing trach/PEG. 1/2 amp for FS 81.   10/11: BD 11. GEORGE overnight. Neuro stable. Trach/PEG consults placed.   10/12: BD 12. GEORGE overnight. Neuro stable. MRI brain complete.   10/13: BD 13. Increase flomax. Hold SQH after PM dose for trach tm. IVL.   10/14: BD 14. GEORGE overnight, remains on AC/VC. Gabriel placed for urinary retention. Dc'd free water.  S/p trach with pulm. NGT placed and CXR confirmed in good position.   10/15: BD 15, GEORGE ovn. resumed feeds. spiked 101, pan cx sent.   10/16: BD 16. GEORGE ovn. Lokelma 5mg for K+ 5.4. Started vanc q 24/zosyn for empiric PNA coverage, IVF to 100/hr. PEG held for fever.       REVIEW OF SYSTEMS: [ ] Unable to Assess due to neurologic exam   [ ] All ROS addressed below are non-contributory, except:  Neuro: [ ] Headache [ ] Back pain [ ] Numbness [ ] Weakness [ ] Ataxia [ ] Dizziness [ ] Aphasia [ ] Dysarthria [ ] Visual disturbance  Resp: [ ] Shortness of breath/dyspnea, [ ] Orthopnea [ ] Cough  CV: [ ] Chest pain [ ] Palpitation [ ] Lightheadedness [ ] Syncope  Renal: [ ] Thirst [ ] Edema  GI: [ ] Nausea [ ] Emesis [ ] Abdominal pain [ ] Constipation [ ] Diarrhea  Hem: [ ] Hematemesis [ ] bright red blood per rectum  ID: [ ] Fever [ ] Chills [ ] Dysuria  ENT: [ ] Rhinorrhea    PHYSICAL EXAM:    General: trach in place   Neurological: dysconjugate gaze, not following commands  absent R corneal reflex, L is still present, does not doll to movement of head, strong cough, able to maintain spontaneous breaths on CPAP, RUE extension, LUE 0/5, b/l LE WD vs TF  to nox,  Pulmonary: Clear to Auscultation, No Rales, No Rhonchi, No Wheezes   Cardiovascular: S1, S2, Regular Rate and Rhythm   Gastrointestinal: Soft, Nontender, Nondistended   Extremities: No LE edema     ICU Vital Signs Last 24 Hrs  T(C): 37.5 (16 Oct 2024 15:00), Max: 38.2 (16 Oct 2024 01:01)  T(F): 99.5 (16 Oct 2024 15:00), Max: 100.8 (16 Oct 2024 01:01)  HR: 90 (16 Oct 2024 18:00) (82 - 97)  BP: --  BP(mean): --  ABP: 130/60 (16 Oct 2024 18:00) (100/55 - 178/98)  ABP(mean): 82 (16 Oct 2024 18:00) (69 - 124)  RR: 17 (16 Oct 2024 18:00) (9 - 18)  SpO2: 100% (16 Oct 2024 18:00) (94% - 100%)    O2 Parameters below as of 16 Oct 2024 18:00  Patient On (Oxygen Delivery Method): ventilator                       INTERVAL HISTORY: HPI:  Unknown age male, unknown past medical history (reported stroke and MI by coworkers) presented to [facility] with AMS, Pt was working at [employer] when he was found down by coworkers. EMS called and pt brought to [facility] ED. Intubated, sedated, started on cardene for SBPs in 200s. CT head showed brain stem bleed. Transferred to NSICU for further management.  (30 Sep 2024 12:55)    NeuroICU Course/Interval Events  9/30: transferred from [facility]. A line placed. Versed dc'd. Cy Rader at bedside, states pt has family in [place], cannot confirm medications or PMH other than stroke and MI. 250cc bolus 3% given. LR switched to NS. hydralazine 25q8 started, 3% started, switched propofol to precedex   10/1: stability CTH done. Added labetalol, started TF. Palliative consulted. ethics consulted to determine surrogate. febrile 103, pan cx sent  10/2: BD 2, GEORGE overnight. TF resumed. Desatt'd to 80s, FiO2 inc. to 50. Fentanyl given, ABG, CXR ordered. Maxxed on precedex, started on propofol for DARIEN -4 - -5. Precedex dc'd. Duonebs, mucomyst, hypertonic added. 3% dc'd. Cardene dc'd. Start vanc/CTX. Increased labetalol 200q8. MRSA negative, dc'd vanc. ETT pulled back 2cm x 2, good positioning after confirmatory chest xray. Ethics attempting to establish HCP with family. Na 159, starting FW 250q6 for range 150-155.   10/3: BD3, GEORGE o/n, neuro stable. Na elevating, FW increased to 300q6. Dc'd bowel reg for diarrhea. vEEG started. SQH 5000q8 tonight.   10/4: BD 4, albumn bolus, incr. LR to 80 2/2 incr. in Cr, LR to 100cc/hr for uptrending Cr. Started 7% hypersal for 48hrs and SL atropine for inline/oral thick secretions. Dc'd CTX and started ancef for MSSA in the sputum. Nephrology consulted for CKD, f/u recs. SBP 170s, given hydralazine 10mg IVP.   10/5: BD5, o/n 10mg IVP hydralazine given for SBP 170s and started on hydralazine 25q8 via OGT. 10mg IV push labetalol for SBP > 160s. RT placed for diarrhea.   10/6: BD6, o/n FW increased to 350q4 per nephrology recs. IV tylenol for temp 100.6, SBp 160s presumed uncomfortable.   10/7: BD7, overnight pancultured for temp 101.8F.   10/8: BD8. GEORGE. Cr bumped. decreased LR to 75cc/hr. Adding simethicone ATC. incr hydralazine 50mgTID. Incr labetalol 300mgTID. Na 145, decreased FWF to 250q6. Start precedex. FENa consistent with intrinsic kidney injury. Pend repeat renal US. Retaining up to 1.3L, bladder scans q6, straight cath PRN  10/9: BD 9. GEORGE overnight. Neuro stable. abd xray for distention w non-specific gas pattern, OGT to LIWS for morning. duonebs/mucomyst to q8 for improving secretions. Changed tube feeds to Jevity 1.5 20cc/hr, low rate due to abdominal distention, nepro dense and more difficult to digest. Tolerating CPAP, confirmed by ABG.   10/10: BD 10. GEORGE overnight. Neuro stable. (+) gabriel for urinary retention on bladder scan. inc TF to goal rate of 40cc/hr. family leaning toward pursuing trach/PEG. 1/2 amp for FS 81.   10/11: BD 11. GEORGE overnight. Neuro stable. Trach/PEG consults placed.   10/12: BD 12. GEORGE overnight. Neuro stable. MRI brain complete.   10/13: BD 13. Increase flomax. Hold SQH after PM dose for trach tm. IVL.   10/14: BD 14. GEORGE overnight, remains on AC/VC. Gabriel placed for urinary retention. Dc'd free water.  S/p trach with pulm. NGT placed and CXR confirmed in good position.   10/15: BD 15, GEORGE ovn. resumed feeds. spiked 101, pan cx sent.   10/16: BD 16. GEORGE ovn. Lokelma 5mg for K+ 5.4. Started vanc q 24/zosyn for empiric PNA coverage, IVF to 100/hr. PEG held for fever.       REVIEW OF SYSTEMS: [ ] Unable to Assess due to neurologic exam   [ ] All ROS addressed below are non-contributory, except:  Neuro: [ ] Headache [ ] Back pain [ ] Numbness [ ] Weakness [ ] Ataxia [ ] Dizziness [ ] Aphasia [ ] Dysarthria [ ] Visual disturbance  Resp: [ ] Shortness of breath/dyspnea, [ ] Orthopnea [ ] Cough  CV: [ ] Chest pain [ ] Palpitation [ ] Lightheadedness [ ] Syncope  Renal: [ ] Thirst [ ] Edema  GI: [ ] Nausea [ ] Emesis [ ] Abdominal pain [ ] Constipation [ ] Diarrhea  Hem: [ ] Hematemesis [ ] bright red blood per rectum  ID: [ ] Fever [ ] Chills [ ] Dysuria  ENT: [ ] Rhinorrhea    PHYSICAL EXAM:    General: trach in place   Neurological: dysconjugate gaze, not following commands  absent R corneal reflex, L is still present, does not doll to movement of head, strong cough, able to maintain spontaneous breaths on CPAP, RUE extension, LUE 0/5, b/l LE WD vs TF  to nox,  Pulmonary: Clear to Auscultation, No Rales, No Rhonchi, No Wheezes   Cardiovascular: S1, S2, Regular Rate and Rhythm   Gastrointestinal: Soft, Nontender, Nondistended   Extremities: No LE edema             ICU Vital Signs Last 24 Hrs  T(C): 37.5 (16 Oct 2024 15:00), Max: 38.2 (16 Oct 2024 01:01)  T(F): 99.5 (16 Oct 2024 15:00), Max: 100.8 (16 Oct 2024 01:01)  HR: 90 (16 Oct 2024 18:00) (82 - 97)  BP: --  BP(mean): --  ABP: 130/60 (16 Oct 2024 18:00) (100/55 - 178/98)  ABP(mean): 82 (16 Oct 2024 18:00) (69 - 124)  RR: 17 (16 Oct 2024 18:00) (9 - 18)  SpO2: 100% (16 Oct 2024 18:00) (94% - 100%)      10-15-24 @ 07:01  -  10-16-24 @ 07:00  --------------------------------------------------------  IN: 1150 mL / OUT: 1980 mL / NET: -830 mL    10-16-24 @ 07:01  -  10-16-24 @ 18:58  --------------------------------------------------------  IN: 2601 mL / OUT: 900 mL / NET: 1701 mL      Mode: AC/ CMV (Assist Control/ Continuous Mandatory Ventilation), RR (machine): 12, TV (machine): 430, FiO2: 40, PEEP: 8, ITime: 1, MAP: 13, PIP: 18      acetaminophen   Oral Liquid .. 650 milliGRAM(s) Oral every 6 hours PRN  acetylcysteine 10%  Inhalation 4 milliLiter(s) Inhalation every 12 hours  albuterol/ipratropium for Nebulization 3 milliLiter(s) Nebulizer every 12 hours  amLODIPine   Tablet 10 milliGRAM(s) Oral daily  calcium acetate 667 milliGRAM(s) Oral three times a day with meals  chlorhexidine 2% Cloths 1 Application(s) Topical <User Schedule>  heparin   Injectable 5000 Unit(s) SubCutaneous every 8 hours  hydrALAZINE 50 milliGRAM(s) Oral every 8 hours  labetalol 300 milliGRAM(s) Enteral Tube every 8 hours  oxyCODONE    IR 5 milliGRAM(s) Oral every 4 hours PRN  pantoprazole  Injectable 40 milliGRAM(s) IV Push daily  petrolatum Ophthalmic Ointment 1 Application(s) Both EYES two times a day  piperacillin/tazobactam IVPB.. 3.375 Gram(s) IV Intermittent every 8 hours  polyethylene glycol 3350 17 Gram(s) Oral two times a day  senna 2 Tablet(s) Oral at bedtime  simethicone 80 milliGRAM(s) Chew every 6 hours  sodium chloride 0.9%. 1000 milliLiter(s) (100 mL/Hr) IV Continuous <Continuous>  tamsulosin 0.8 milliGRAM(s) Oral at bedtime  vancomycin  IVPB 1250 milliGRAM(s) IV Intermittent every 24 hours                          11.4   13.85 )-----------( 385      ( 16 Oct 2024 05:14 )             35.6     10-16    132[L]  |  x   |  x   ----------------------------<  x   x    |  x   |  3.58[H]    Ca    8.8      16 Oct 2024 05:14  Phos  5.4     10-16  Mg     2.5     10-16        ABG - ( 15 Oct 2024 15:37 )  pH, Arterial: 7.43  pH, Blood: x     /  pCO2: 31    /  pO2: 79    / HCO3: 21    / Base Excess: -2.7  /  SaO2: 97.9

## 2024-10-16 NOTE — PROGRESS NOTE ADULT - SUBJECTIVE AND OBJECTIVE BOX
INTERVAL HPI/OVERNIGHT EVENTS: Febrile to 100.8 and 100.4 overnight, and day prior. Blood cultures sent with no growth to date.    STATUS POST:  Bedside tracheostomy on 10/14    SUBJECTIVE:  Patient seen and examined on AM rounds. Febrile overnight to 100.8F. Abdomen soft, NT, ND. WBC slowly trending up 13.35 from 11.07, now started on Vancomycin and Zosyn.     MEDICATIONS  (STANDING):  acetylcysteine 10%  Inhalation 4 milliLiter(s) Inhalation every 12 hours  albuterol/ipratropium for Nebulization 3 milliLiter(s) Nebulizer every 12 hours  amLODIPine   Tablet 10 milliGRAM(s) Oral daily  calcium acetate 667 milliGRAM(s) Oral three times a day with meals  chlorhexidine 2% Cloths 1 Application(s) Topical <User Schedule>  heparin   Injectable 5000 Unit(s) SubCutaneous every 8 hours  hydrALAZINE 50 milliGRAM(s) Oral every 8 hours  insulin lispro (ADMELOG) corrective regimen sliding scale   SubCutaneous every 6 hours  labetalol 300 milliGRAM(s) Enteral Tube every 8 hours  pantoprazole  Injectable 40 milliGRAM(s) IV Push daily  petrolatum Ophthalmic Ointment 1 Application(s) Both EYES two times a day  piperacillin/tazobactam IVPB.. 3.375 Gram(s) IV Intermittent every 8 hours  polyethylene glycol 3350 17 Gram(s) Oral two times a day  senna 2 Tablet(s) Oral at bedtime  simethicone 80 milliGRAM(s) Chew every 6 hours  sodium chloride 0.9%. 1000 milliLiter(s) (100 mL/Hr) IV Continuous <Continuous>  tamsulosin 0.8 milliGRAM(s) Oral at bedtime  vancomycin  IVPB 1250 milliGRAM(s) IV Intermittent every 24 hours    MEDICATIONS  (PRN):  acetaminophen   Oral Liquid .. 650 milliGRAM(s) Oral every 6 hours PRN Temp greater or equal to 38C (100.4F)  oxyCODONE    IR 5 milliGRAM(s) Oral every 4 hours PRN Mild Pain (1 - 3)      Vital Signs Last 24 Hrs  T(C): 37.3 (16 Oct 2024 08:00), Max: 38.4 (15 Oct 2024 17:54)  T(F): 99.1 (16 Oct 2024 08:00), Max: 101.1 (15 Oct 2024 17:54)  HR: 94 (16 Oct 2024 12:00) (82 - 100)  BP: --  BP(mean): --  RR: 15 (16 Oct 2024 12:00) (9 - 19)  SpO2: 99% (16 Oct 2024 12:00) (94% - 100%)    Parameters below as of 16 Oct 2024 12:00  Patient On (Oxygen Delivery Method): ventilator    O2 Concentration (%): 40    PHYSICAL EXAM:    General: Sedated.  Neuro: Aox1, Responsive to stimulus  Cardio: S1,S2, No MRG  Pulm: Tracheostomy in place, non labored breathing.  Abdomen: soft, non-distended, non-tender, no scars noted. Has a rectal tube in place and is making BMs.   : Vuong in place  Extremities: WWP, peripheral pulses appreciated        I&O's Detail    15 Oct 2024 07:01  -  16 Oct 2024 07:00  --------------------------------------------------------  IN:    Enteral Tube Flush: 150 mL    Jevity 1.5: 520 mL    sodium chloride 0.9%: 480 mL  Total IN: 1150 mL    OUT:    Indwelling Catheter - Urethral (mL): 1980 mL  Total OUT: 1980 mL    Total NET: -830 mL      16 Oct 2024 07:01  -  16 Oct 2024 12:40  --------------------------------------------------------  IN:    Enteral Tube Flush: 120 mL    IV PiggyBack: 501 mL    IV PiggyBack: 150 mL    sodium chloride 0.9%: 80 mL    sodium chloride 0.9%: 500 mL  Total IN: 1351 mL    OUT:    Indwelling Catheter - Urethral (mL): 370 mL  Total OUT: 370 mL    Total NET: 981 mL          LABS:                        11.4   13.85 )-----------( 385      ( 16 Oct 2024 05:14 )             35.6     10-16    135  |  104  |  50[H]  ----------------------------<  116[H]  5.4[H]   |  19[L]  |  3.51[H]    Ca    8.8      16 Oct 2024 05:14  Phos  5.4     10-16  Mg     2.5     10-16      PT/INR - ( 16 Oct 2024 05:14 )   PT: 12.9 sec;   INR: 1.12          PTT - ( 16 Oct 2024 05:14 )  PTT:32.0 sec  Urinalysis Basic - ( 16 Oct 2024 05:14 )    Color: x / Appearance: x / SG: x / pH: x  Gluc: 116 mg/dL / Ketone: x  / Bili: x / Urobili: x   Blood: x / Protein: x / Nitrite: x   Leuk Esterase: x / RBC: x / WBC x   Sq Epi: x / Non Sq Epi: x / Bacteria: x        RADIOLOGY & ADDITIONAL STUDIES:

## 2024-10-16 NOTE — PROGRESS NOTE ADULT - PROVIDER SPECIALTY LIST ADULT
Surgery [No Acute Distress] : no acute distress [Normal Oropharynx] : normal oropharynx [Normal Appearance] : normal appearance [No Neck Mass] : no neck mass [Normal Rate/Rhythm] : normal rate/rhythm [Normal S1, S2] : normal s1, s2 [No Murmurs] : no murmurs [No Resp Distress] : no resp distress [Clear to Auscultation Bilaterally] : clear to auscultation bilaterally [No Abnormalities] : no abnormalities [Normal Gait] : normal gait [No Clubbing] : no clubbing [No Cyanosis] : no cyanosis [No Edema] : no edema [FROM] : FROM [No Focal Deficits] : no focal deficits [Oriented x3] : oriented x3 [Normal Affect] : normal affect

## 2024-10-16 NOTE — PROGRESS NOTE ADULT - SUBJECTIVE AND OBJECTIVE BOX
HPI:  Unknown age male, unknown past medical history (reported stroke and MI by coworkers) presented to [facility] with AMS, Pt was working at [employer] when he was found down by coworkers. EMS called and pt brought to [facility] ED. Intubated, sedated, started on cardene for SBPs in 200s. CT head showed brain stem bleed. Transferred to NSICU for further management.  (30 Sep 2024 12:55)    OVERNIGHT EVENTS: george, neuro stable    Hospital Course:   : transferred from [facility]. A line placed. Versed dc'd. Cy Rader at bedside, states pt has family in [place], cannot confirm medications or PMH other than stroke and MI. 250cc bolus 3% given. LR switched to NS. hydralazine 25q8 started, 3% started, switched propofol to precedex   10/1: stability CTH done. Added labetalol, started TF. Palliative consulted. ethics consulted to determine surrogate. febrile 103, pan cx sent  10/2: BD 2, GEORGE overnight. TF resumed. Desatt'd to 80s, FiO2 inc. to 50. Fentanyl given, ABG, CXR ordered. Maxxed on precedex, started on propofol for DARIEN -4 - -5. Precedex dc'd. Duonebs, mucomyst, hypertonic added. 3% dc'd. Cardene dc'd. Start vanc/CTX. Increased labetalol 200q8. MRSA negative, dc'd vanc. ETT pulled back 2cm x 2, good positioning after confirmatory chest xray. Ethics attempting to establish HCP with family. Na 159, starting FW 250q6 for range 150-155.   10/3: BD3, GEORGE o/n, neuro stable. Na elevating, FW increased to 300q6. Dc'd bowel reg for diarrhea. vEEG started. SQH 5000q8 tonight.   10/4: BD 4, albumn bolus, incr. LR to 80 2/2 incr. in Cr, LR to 100cc/hr for uptrending Cr. Started 7% hypersal for 48hrs and SL atropine for inline/oral thick secretions. Dc'd CTX and started ancef for MSSA in the sputum. Nephrology consulted for CKD, f/u recs. SBP 170s, given hydralazine 10mg IVP.   10/5: BD5, o/n 10mg IVP hydralazine given for SBP 170s and started on hydralazine 25q8 via OGT. 10mg IV push labetalol for SBP > 160s. RT placed for diarrhea.   10/6: BD6, o/n FW increased to 350q4 per nephrology recs. IV tylenol for temp 100.6, SBp 160s presumed uncomfortable.   10/7: BD7, overnight pancultured for temp 101.8F.   10/8: BD8. GEORGE. Cr bumped. decreased LR to 75cc/hr. Adding simethicone ATC. incr hydralazine 50mgTID. Incr labetalol 300mgTID. Na 145, decreased FWF to 250q6. Start precedex. FENa consistent with intrinsic kidney injury. Pend repeat renal US. Retaining up to 1.3L, bladder scans q6, straight cath PRN  10/9: BD 9. GEORGE overnight. Neuro stable. abd xray for distention w non-specific gas pattern, OGT to LIWS for morning. duonebs/mucomyst to q8 for improving secretions. Changed tube feeds to Jevity 1.5 20cc/hr, low rate due to abdominal distention, nepro dense and more difficult to digest. Tolerating CPAP, confirmed by ABG.   10/10: BD 10. GEORGE overnight. Neuro stable. (+) gabriel for urinary retention on bladder scan. inc TF to goal rate of 40cc/hr. family leaning toward pursuing trach/PEG. 1/2 amp for FS 81.   10/11: BD 11. GEORGE overnight. Neuro stable. Trach/PEG consults placed.   10/12: BD 12. GEORGE overnight. Neuro stable. MRI brain complete.   10/13: BD 13. Increase flomax. Hold SQH after PM dose for trach tm. IVL.   10/14: BD 14. GEORGE overnight, remains on AC/VC. Gabriel placed for urinary retention. Dc'd free water.  S/p trach with pulm. NGT placed and CXR confirmed in good position.   10/15: BD 15, GEORGE ovn. resumed feeds. spiked 101, pan cx sent.   10/16: BD 16. GEORGE ovn.     Vital Signs Last 24 Hrs  T(C): 38 (16 Oct 2024 00:00), Max: 38.4 (15 Oct 2024 17:54)  T(F): 100.4 (16 Oct 2024 00:00), Max: 101.1 (15 Oct 2024 17:54)  HR: 97 (16 Oct 2024 00:00) (81 - 100)  BP: --  BP(mean): --  RR: 11 (16 Oct 2024 00:00) (9 - 19)  SpO2: 97% (16 Oct 2024 00:00) (94% - 99%)    Parameters below as of 16 Oct 2024 00:00  Patient On (Oxygen Delivery Method): ventilator    O2 Concentration (%): 40    I&O's Summary    14 Oct 2024 07:01  -  15 Oct 2024 07:00  --------------------------------------------------------  IN: 1092.4 mL / OUT: 2395 mL / NET: -1302.6 mL    15 Oct 2024 07:01  -  16 Oct 2024 00:38  --------------------------------------------------------  IN: 670 mL / OUT: 1520 mL / NET: -850 mL        PHYSICAL EXAM:  General: patient seen laying supine in bed in NAD on AC  Neuro: not OE, +L corneal -R corneal, +cough/gag, b/l UE trace movement to noxious, b/l LE TF to noxious  HEENT: PERRL 2 mm  Neck: supple  Cardiac: RRR, S1S2  Pulmonary: chest rise symmetric  Abdomen: soft, nontender, nondistended  Ext: perfusing well  Skin: warm, dry    TUBES/LINES:  [x] Gabriel  [] Lumbar Drain  [] Wound Drains  [] Others      DIET:  [] NPO  [] Mechanical  [x] Tube feeds    LABS:                        11.4   12.29 )-----------( 367      ( 15 Oct 2024 05:30 )             35.3     10-15    134[L]  |  104  |  44[H]  ----------------------------<  113[H]  5.1   |  20[L]  |  3.27[H]    Ca    8.8      15 Oct 2024 05:30  Phos  5.3     10-15  Mg     2.5     10-15        Urinalysis Basic - ( 15 Oct 2024 19:12 )    Color: Yellow / Appearance: Clear / S.016 / pH: x  Gluc: x / Ketone: Negative mg/dL  / Bili: Negative / Urobili: 0.2 mg/dL   Blood: x / Protein: 300 mg/dL / Nitrite: Negative   Leuk Esterase: Negative / RBC: 1 /HPF / WBC 2 /HPF   Sq Epi: x / Non Sq Epi: 1 /HPF / Bacteria: Negative /HPF          CAPILLARY BLOOD GLUCOSE      POCT Blood Glucose.: 127 mg/dL (15 Oct 2024 23:53)  POCT Blood Glucose.: 121 mg/dL (15 Oct 2024 23:39)  POCT Blood Glucose.: 124 mg/dL (15 Oct 2024 18:54)  POCT Blood Glucose.: 101 mg/dL (15 Oct 2024 11:46)  POCT Blood Glucose.: 116 mg/dL (15 Oct 2024 06:35)      Drug Levels: [] N/A    CSF Analysis: [] N/A      Allergies    Allergy Status Unknown    Intolerances      MEDICATIONS:  Antibiotics:    Neuro:  acetaminophen   Oral Liquid .. 650 milliGRAM(s) Oral every 6 hours PRN  oxyCODONE    IR 5 milliGRAM(s) Oral every 4 hours PRN    Anticoagulation:  heparin   Injectable 5000 Unit(s) SubCutaneous every 8 hours    OTHER:  acetylcysteine 10%  Inhalation 4 milliLiter(s) Inhalation every 12 hours  albuterol/ipratropium for Nebulization 3 milliLiter(s) Nebulizer every 12 hours  amLODIPine   Tablet 10 milliGRAM(s) Oral daily  chlorhexidine 2% Cloths 1 Application(s) Topical <User Schedule>  hydrALAZINE 50 milliGRAM(s) Oral every 8 hours  insulin lispro (ADMELOG) corrective regimen sliding scale   SubCutaneous every 6 hours  labetalol 300 milliGRAM(s) Enteral Tube every 8 hours  pantoprazole  Injectable 40 milliGRAM(s) IV Push daily  petrolatum Ophthalmic Ointment 1 Application(s) Both EYES two times a day  polyethylene glycol 3350 17 Gram(s) Oral two times a day  senna 2 Tablet(s) Oral at bedtime  simethicone 80 milliGRAM(s) Chew every 6 hours  tamsulosin 0.8 milliGRAM(s) Oral at bedtime    IVF:  calcium acetate 667 milliGRAM(s) Oral three times a day with meals  sodium chloride 0.9%. 1000 milliLiter(s) IV Continuous <Continuous>    CULTURES:  Culture Results:   No growth at 48 Hours (10-12 @ 18:14)  Culture Results:   No growth at 48 Hours (10-12 @ 18:14)    RADIOLOGY & ADDITIONAL TESTS:      ASSESSMENT:  45 y/o PMH ?stroke/MI present to [facility] after collapsing at work. Decorticate posturing, vomiting, intubated for airway protection. 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 Transferred to [facility] for further management. s/p trach 10/14.     AMS    Handoff    Acute myocardial infarction    CVA (cerebral vascular accident)    Intracerebral hemorrhage of brain stem    Brainstem stroke    Brain stem hemorrhage    Brain stem stroke syndrome    Hemorrhagic stroke    Brainstem stroke    Encephalopathy acute    Functional quadriplegia    Advanced care planning/counseling discussion    Encounter for palliative care    Percutaneous tracheal puncture    Altered mental status examination    AMS    [email or web address]        PLAN:  Neuro:  - Neuro q4/vitals q1  - pain control: Tylenol prn   - CTH : enlarged pontine hemorrhage, CTH 10/3 done read stable   - vEEG (10/3-)- negative  - stroke core measures, stroke neuro following  - MRI brain w/ w/o contrast 10/12: parenchymal hemorrhage, acute/subacute R cerebellar stroke      CV:  - SBP <160  - HTN: amlodipine 10mg qd, labetalol 300mg q8h, hydralazine 50mg q8h  - echo () EF 75%    Resp:  - trach to vent, CPAP as tolerated  - duonebs/mucomyst q12    GI:  - TF via NGT, NPO after midnight for PEG  - bowel reg held for diarrhea, RT (10/5-10/12)   - GI ppx: Protonix while intubated  - Abd distension: Simethicone 80mg q6    Renal:  - IVL  - Hyperphosphatemia: phoslo TID  - Urinary retenion: Flomax 0.8mg at bedtime   - Gabriel (10/14- ) iso urinary retention   - CKD: trend Cr   - renal US 10/1: echogenicity c/w chronic med renal dz, repeat done 10/8: inc renal echogeicity, c/f medical renal disease w increased hydro     Endo:  - ISS, A1c 5.4    Heme:  - DVT ppx: SCDs, SQH 5000q8 held post trach     ID:  - Last pan cx 10/15  - MRSA swab negative, sputum MSSA + , s/p 1 dose vanc (10/2), CTX (10/2 - 10/4), Ancef (10/4-10/14)     Dispo: NSICU, full code, pending PT/OT    D/w Dr. D'Amico, Dr. Llanes    Assessment:  Present when checked    []  GCS  E   V  M     Heart Failure: []Acute, [] acute on chronic , []chronic  Heart Failure:  [] Diastolic (HFpEF), [] Systolic (HFrEF), []Combined (HFpEF and HFrEF), [] RHF, [] Pulm HTN, [] Other    [] ANIKA, [] ATN, [] AIN, [] other  [] CKD1, [] CKD2, [] CKD 3, [] CKD 4, [] CKD 5, []ESRD    Encephalopathy: [] Metabolic, [] Hepatic, [] toxic, [] Neurological, [] Other    Abnormal Nurtitional Status: [] malnurtition (see nutrition note), [ ]underweight: BMI < 19, [] morbid obesity: BMI >40, [] Cachexia    [] Sepsis  [] hypovolemic shock,[] cardiogenic shock, [] hemorrhagic shock, [] neuogenic shock  [] Acute Respiratory Failure  []Cerebral edema, [] Brain compression/ herniation,   [] Functional quadriplegia  [] Acute blood loss anemia

## 2024-10-16 NOTE — PROGRESS NOTE ADULT - SUBJECTIVE AND OBJECTIVE BOX
[facility] AD[facility]    Nighttime rounds were performed     Patient is a 45 y/o male with unknown past medical history (reported stroke and MI by coworkers). He presented to [facility] with AMS. Prior to this, pt was working at [employer] when he was found down by coworkers. EMS called and pt brought to [facility] ED. Intubated, sedated, started on cardene for SBPs in 200s. CT head showed pontine bleed. Transferred to NSICU for further management.  (30 Sep 2024 12:55)      ICU Vital Signs Last 24 Hrs  T(C): 37.5 (16 Oct 2024 15:00), Max: 38.2 (16 Oct 2024 01:01)  T(F): 99.5 (16 Oct 2024 15:00), Max: 100.8 (16 Oct 2024 01:01)  HR: 91 (16 Oct 2024 20:00) (82 - 97)  ABP: 138/68 (16 Oct 2024 20:00) (100/55 - 178/98)  ABP(mean): 91 (16 Oct 2024 20:00) (69 - 124)  RR: 18 (16 Oct 2024 20:00) (10 - 18)  SpO2: 96% (16 Oct 2024 20:00) (96% - 100%)      10-15-24 @ 07:01  -  10-16-24 @ 07:00  --------------------------------------------------------  IN: 1150 mL / OUT: 1980 mL / NET: -830 mL    10-16-24 @ 07:01  -  10-16-24 @ 20:13  --------------------------------------------------------  IN: 2741 mL / OUT: 1075 mL / NET: 1666 mL      Mode: AC/ CMV (Assist Control/ Continuous Mandatory Ventilation), RR (machine): 12, TV (machine): 430, FiO2: 40, PEEP: 8, ITime: 1, MAP: 13, PIP: 18      PHYSICAL EXAM:  Neuro: Dysconjugate gaze, absent R corneal reflex, L present, does not doll to movement of head, strong cough,   RUE extension, LUE extension, b/l  LE TFs  Pulm: Diminshed  Cards: S1, S2, RRR  GI: Soft, Nontender, Nondistended   Extremities: No LE edema       MEDICATIONS:   acetaminophen   Oral Liquid .. 650 milliGRAM(s) Oral every 6 hours PRN  acetylcysteine 10%  Inhalation 4 milliLiter(s) Inhalation every 12 hours  albuterol/ipratropium for Nebulization 3 milliLiter(s) Nebulizer every 12 hours  amLODIPine   Tablet 10 milliGRAM(s) Oral daily  calcium acetate 667 milliGRAM(s) Oral three times a day with meals  chlorhexidine 2% Cloths 1 Application(s) Topical <User Schedule>  heparin   Injectable 5000 Unit(s) SubCutaneous every 8 hours  hydrALAZINE 50 milliGRAM(s) Oral every 8 hours  labetalol 300 milliGRAM(s) Enteral Tube every 8 hours  oxyCODONE    IR 5 milliGRAM(s) Oral every 4 hours PRN  pantoprazole  Injectable 40 milliGRAM(s) IV Push daily  petrolatum Ophthalmic Ointment 1 Application(s) Both EYES two times a day  piperacillin/tazobactam IVPB.. 3.375 Gram(s) IV Intermittent every 8 hours  polyethylene glycol 3350 17 Gram(s) Oral two times a day  senna 2 Tablet(s) Oral at bedtime  simethicone 80 milliGRAM(s) Chew every 6 hours  sodium chloride 0.9%. 1000 milliLiter(s) (100 mL/Hr) IV Continuous <Continuous>  tamsulosin 0.8 milliGRAM(s) Oral at bedtime  vancomycin  IVPB 1250 milliGRAM(s) IV Intermittent every 24 hours    LABS:                     11.4   13.85 )-----------( 385      ( 16 Oct 2024 05:14 )             35.6     10-16    132[L]  |  x   |  x   ----------------------------<  x   x    |  x   |  3.58[H]    Ca    8.8      16 Oct 2024 05:14  Phos  5.4     10-16  Mg     2.5     10-16        ABG - ( 15 Oct 2024 15:37 )  pH, Arterial: 7.43  pH, Blood: x     /  pCO2: 31    /  pO2: 79    / HCO3: 21    / Base Excess: -2.7  /  SaO2: 97.9        ASSESSMENT/PLAN:  71 y/o M with 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 CPAP as tolerated  Pulm toilet    CV:  SBP goal 100-160  Labetalol, hydralazine, amlodipine    RENAL: CKD (superimposed ANIKA)  Na goal 135-145. Monitor BMPs  Vuong placed in AM 10/14.   Doxazosin 0.8mg   IVF: NS at 100cc/hr  Nephrology following    GI:  Diet: TFs*  PEG pending  GI prophylaxis [] not indicated [x] PPI [] other:  Bowel regimen [] colace [x] senna [] other: miralax  LBM: 10/12    ENDO:   Goal euglycemia (-180)  A1c: 5.3    HEME/ONC:  VTE prophylaxis: [x] SCDs [x] chemoprophylaxis SQH    ID: Febrile  S/P Ancef  Cultures: blood and sputum pending  Procalcitonin    Patient remains critically ill.    Additional 45 minutes of critical care time.                 [facility] -- ADDITIONAL PROGRESS NOTE    Nighttime rounds were performed     Patient is a 43 y/o male with unknown past medical history (reported stroke and MI by coworkers). He presented to [facility] with AMS. Prior to this, pt was working at [employer] when he was found down by coworkers. EMS called and pt brought to [facility] ED. Intubated, sedated, started on cardene for SBPs in 200s. CT head showed pontine bleed. Transferred to NSICU for further management.  (30 Sep 2024 12:55)      ICU Vital Signs Last 24 Hrs  T(C): 37.5 (16 Oct 2024 15:00), Max: 38.2 (16 Oct 2024 01:01)  T(F): 99.5 (16 Oct 2024 15:00), Max: 100.8 (16 Oct 2024 01:01)  HR: 91 (16 Oct 2024 20:00) (82 - 97)  ABP: 138/68 (16 Oct 2024 20:00) (100/55 - 178/98)  ABP(mean): 91 (16 Oct 2024 20:00) (69 - 124)  RR: 18 (16 Oct 2024 20:00) (10 - 18)  SpO2: 96% (16 Oct 2024 20:00) (96% - 100%)      10-15-24 @ 07:01  -  10-16-24 @ 07:00  --------------------------------------------------------  IN: 1150 mL / OUT: 1980 mL / NET: -830 mL    10-16-24 @ 07:01  -  10-16-24 @ 20:13  --------------------------------------------------------  IN: 2741 mL / OUT: 1075 mL / NET: 1666 mL      Mode: AC/ CMV (Assist Control/ Continuous Mandatory Ventilation), RR (machine): 12, TV (machine): 430, FiO2: 40, PEEP: 8, ITime: 1, MAP: 13, PIP: 18      PHYSICAL EXAM:  Neuro: Dysconjugate gaze, absent R corneal reflex, L present, does not doll to movement of head, strong cough,   RUE extension, LUE extension, b/l  LE TFs  Pulm: Diminshed  Cards: S1, S2, RRR  GI: Soft, Nontender, Nondistended   Extremities: No LE edema       MEDICATIONS:   acetaminophen   Oral Liquid .. 650 milliGRAM(s) Oral every 6 hours PRN  acetylcysteine 10%  Inhalation 4 milliLiter(s) Inhalation every 12 hours  albuterol/ipratropium for Nebulization 3 milliLiter(s) Nebulizer every 12 hours  amLODIPine   Tablet 10 milliGRAM(s) Oral daily  calcium acetate 667 milliGRAM(s) Oral three times a day with meals  chlorhexidine 2% Cloths 1 Application(s) Topical <User Schedule>  heparin   Injectable 5000 Unit(s) SubCutaneous every 8 hours  hydrALAZINE 50 milliGRAM(s) Oral every 8 hours  labetalol 300 milliGRAM(s) Enteral Tube every 8 hours  oxyCODONE    IR 5 milliGRAM(s) Oral every 4 hours PRN  pantoprazole  Injectable 40 milliGRAM(s) IV Push daily  petrolatum Ophthalmic Ointment 1 Application(s) Both EYES two times a day  piperacillin/tazobactam IVPB.. 3.375 Gram(s) IV Intermittent every 8 hours  polyethylene glycol 3350 17 Gram(s) Oral two times a day  senna 2 Tablet(s) Oral at bedtime  simethicone 80 milliGRAM(s) Chew every 6 hours  sodium chloride 0.9%. 1000 milliLiter(s) (100 mL/Hr) IV Continuous <Continuous>  tamsulosin 0.8 milliGRAM(s) Oral at bedtime  vancomycin  IVPB 1250 milliGRAM(s) IV Intermittent every 24 hours    LABS:                     11.4   13.85 )-----------( 385      ( 16 Oct 2024 05:14 )             35.6     10-16    132[L]  |  x   |  x   ----------------------------<  x   x    |  x   |  3.58[H]    Ca    8.8      16 Oct 2024 05:14  Phos  5.4     10-16  Mg     2.5     10-16        ABG - ( 15 Oct 2024 15:37 )  pH, Arterial: 7.43  pH, Blood: x     /  pCO2: 31    /  pO2: 79    / HCO3: 21    / Base Excess: -2.7  /  SaO2: 97.9        ASSESSMENT/PLAN:  71 y/o M with 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 CPAP as tolerated  Pulm toilet    CV:  SBP goal 100-160  Labetalol, hydralazine, amlodipine (hold parameters)    RENAL: CKD (superimposed ANIKA)  Na goal 135-145. Monitor BMPs  Monitor for hyperkalemia  Vuong placed in AM 10/14  Doxazosin 0.8mg   IVF: NS at 100cc/hr  Nephrology following    GI:  Diet: TFs at goal  PEG pending  GI prophylaxis [] not indicated [x] PPI [] other:  Bowel regimen [] colace [x] senna [] other: miralax  LBM: 10/12    ENDO:   Goal euglycemia (-180)  A1c: 5.3    HEME/ONC:  VTE prophylaxis: [x] SCDs [x] chemoprophylaxis SQH    ID: Febrile; recurrent  S/P Ancef  Cultures: Blood pending, sputum GPC and Gram negative coccobacilli  Procalcitonin elevated at 0.3  Antibiotics: Vancomycin and Zosyn    Patient remains critically ill.    Additional 45 minutes of critical care time.

## 2024-10-16 NOTE — PROGRESS NOTE ADULT - ASSESSMENT
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 Patient does not have a prior hx of abdominal surgeries, is not currently on any anticoagulation and is only on SQH. Patients son had a long discussion with palliative care regarding goals of care, is understanding the chance of full recovery is poor, patient's son (SDM) would like to proceed with any measures to prolong his life which includes PEG and trach. Now S/p Bedisde Tracheostomy on 10/14 with Pulmonary. PEG tube placement will be deferred related to the increase in WBC and onset of new fevers.    PLAN:  PEG tube placement possible on 10/23  NPO at Midnight  Pre-op labs including T/S and Coags  Rest of the care as per primary team    General surgery team 4c will continue to follow

## 2024-10-17 LAB
ANION GAP SERPL CALC-SCNC: 9 MMOL/L — SIGNIFICANT CHANGE UP (ref 5–17)
BASE EXCESS BLDA CALC-SCNC: -4.8 MMOL/L — LOW (ref -2–3)
BASE EXCESS BLDA CALC-SCNC: -5.6 MMOL/L — LOW (ref -2–3)
BUN SERPL-MCNC: 52 MG/DL — HIGH (ref 7–23)
CALCIUM SERPL-MCNC: 8.4 MG/DL — SIGNIFICANT CHANGE UP (ref 8.4–10.5)
CHLORIDE SERPL-SCNC: 104 MMOL/L — SIGNIFICANT CHANGE UP (ref 96–108)
CO2 BLDA-SCNC: 20 MMOL/L — SIGNIFICANT CHANGE UP (ref 19–24)
CO2 BLDA-SCNC: 21 MMOL/L — SIGNIFICANT CHANGE UP (ref 19–24)
CO2 SERPL-SCNC: 20 MMOL/L — LOW (ref 22–31)
CREAT SERPL-MCNC: 3.54 MG/DL — HIGH (ref 0.5–1.3)
EGFR: 21 ML/MIN/1.73M2 — LOW
EGFR: 21 ML/MIN/1.73M2 — LOW
GLUCOSE BLDC GLUCOMTR-MCNC: 134 MG/DL — HIGH (ref 70–99)
GLUCOSE SERPL-MCNC: 134 MG/DL — HIGH (ref 70–99)
HCO3 BLDA-SCNC: 19 MMOL/L — LOW (ref 21–28)
HCO3 BLDA-SCNC: 20 MMOL/L — LOW (ref 21–28)
HCT VFR BLD CALC: 29.3 % — LOW (ref 39–50)
HGB BLD-MCNC: 9.2 G/DL — LOW (ref 13–17)
MAGNESIUM SERPL-MCNC: 2.2 MG/DL — SIGNIFICANT CHANGE UP (ref 1.6–2.6)
MCHC RBC-ENTMCNC: 28.9 PG — SIGNIFICANT CHANGE UP (ref 27–34)
MCHC RBC-ENTMCNC: 31.4 GM/DL — LOW (ref 32–36)
MCV RBC AUTO: 92.1 FL — SIGNIFICANT CHANGE UP (ref 80–100)
MRSA PCR RESULT.: NEGATIVE — SIGNIFICANT CHANGE UP
NRBC # BLD: 0 /100 WBCS — SIGNIFICANT CHANGE UP (ref 0–0)
NRBC BLD-RTO: 0 /100 WBCS — SIGNIFICANT CHANGE UP (ref 0–0)
OSMOLALITY SERPL: 300 MOSM/KG — SIGNIFICANT CHANGE UP (ref 275–300)
OSMOLALITY UR: 393 MOSM/KG — SIGNIFICANT CHANGE UP (ref 300–900)
PCO2 BLDA: 34 MMHG — LOW (ref 35–48)
PCO2 BLDA: 35 MMHG — SIGNIFICANT CHANGE UP (ref 35–48)
PH BLDA: 7.35 — SIGNIFICANT CHANGE UP (ref 7.35–7.45)
PH BLDA: 7.37 — SIGNIFICANT CHANGE UP (ref 7.35–7.45)
PHOSPHATE SERPL-MCNC: 5.3 MG/DL — HIGH (ref 2.5–4.5)
PLATELET # BLD AUTO: 317 K/UL — SIGNIFICANT CHANGE UP (ref 150–400)
PO2 BLDA: 117 MMHG — HIGH (ref 83–108)
PO2 BLDA: 142 MMHG — HIGH (ref 83–108)
POTASSIUM SERPL-MCNC: 4.8 MMOL/L — SIGNIFICANT CHANGE UP (ref 3.5–5.3)
POTASSIUM SERPL-SCNC: 4.8 MMOL/L — SIGNIFICANT CHANGE UP (ref 3.5–5.3)
RBC # BLD: 3.18 M/UL — LOW (ref 4.2–5.8)
RBC # FLD: 13 % — SIGNIFICANT CHANGE UP (ref 10.3–14.5)
S AUREUS DNA NOSE QL NAA+PROBE: POSITIVE
SAO2 % BLDA: 98.9 % — HIGH (ref 94–98)
SAO2 % BLDA: 99.2 % — HIGH (ref 94–98)
SODIUM SERPL-SCNC: 133 MMOL/L — LOW (ref 135–145)
WBC # BLD: 9.1 K/UL — SIGNIFICANT CHANGE UP (ref 3.8–10.5)
WBC # FLD AUTO: 9.1 K/UL — SIGNIFICANT CHANGE UP (ref 3.8–10.5)

## 2024-10-17 PROCEDURE — 99291 CRITICAL CARE FIRST HOUR: CPT

## 2024-10-17 PROCEDURE — 43752 NASAL/OROGASTRIC W/TUBE PLMT: CPT

## 2024-10-17 PROCEDURE — 71045 X-RAY EXAM CHEST 1 VIEW: CPT | Mod: 26,77

## 2024-10-17 PROCEDURE — 71045 X-RAY EXAM CHEST 1 VIEW: CPT | Mod: 26

## 2024-10-17 RX ORDER — ERYTHROMYCIN 5 MG/G
1 OINTMENT OPHTHALMIC EVERY 6 HOURS
Refills: 0 | Status: DISCONTINUED | OUTPATIENT
Start: 2024-10-17 | End: 2024-10-22

## 2024-10-17 RX ORDER — CARBIDOPA/LEVODOPA 25MG-100MG
1 TABLET ORAL
Refills: 0 | Status: DISCONTINUED | OUTPATIENT
Start: 2024-10-17 | End: 2024-10-21

## 2024-10-17 RX ORDER — BISACODYL 5 MG
10 TABLET, DELAYED RELEASE (ENTERIC COATED) ORAL DAILY
Refills: 0 | Status: DISCONTINUED | OUTPATIENT
Start: 2024-10-17 | End: 2024-10-24

## 2024-10-17 RX ADMIN — Medication 50 MILLIGRAM(S): at 12:43

## 2024-10-17 RX ADMIN — ERYTHROMYCIN 1 APPLICATION(S): 5 OINTMENT OPHTHALMIC at 18:28

## 2024-10-17 RX ADMIN — Medication 80 MILLIGRAM(S): at 05:40

## 2024-10-17 RX ADMIN — POLYETHYLENE GLYCOL 3350 17 GRAM(S): 17 POWDER, FOR SOLUTION ORAL at 05:40

## 2024-10-17 RX ADMIN — Medication 1 APPLICATION(S): at 05:40

## 2024-10-17 RX ADMIN — ACETYLCYSTEINE 4 MILLILITER(S): 200 INHALANT RESPIRATORY (INHALATION) at 06:36

## 2024-10-17 RX ADMIN — TAMSULOSIN HYDROCHLORIDE 0.8 MILLIGRAM(S): 0.4 CAPSULE ORAL at 21:57

## 2024-10-17 RX ADMIN — Medication 25 GRAM(S): at 05:39

## 2024-10-17 RX ADMIN — HEPARIN SODIUM 5000 UNIT(S): 1000 INJECTION INTRAVENOUS; SUBCUTANEOUS at 21:57

## 2024-10-17 RX ADMIN — Medication 50 MILLIGRAM(S): at 05:39

## 2024-10-17 RX ADMIN — HEPARIN SODIUM 5000 UNIT(S): 1000 INJECTION INTRAVENOUS; SUBCUTANEOUS at 05:39

## 2024-10-17 RX ADMIN — Medication 667 MILLIGRAM(S): at 21:57

## 2024-10-17 RX ADMIN — LABETALOL HYDROCHLORIDE 300 MILLIGRAM(S): 200 TABLET, FILM COATED ORAL at 05:39

## 2024-10-17 RX ADMIN — Medication 80 MILLIGRAM(S): at 00:26

## 2024-10-17 RX ADMIN — Medication 1 TABLET(S): at 16:34

## 2024-10-17 RX ADMIN — LABETALOL HYDROCHLORIDE 300 MILLIGRAM(S): 200 TABLET, FILM COATED ORAL at 21:57

## 2024-10-17 RX ADMIN — Medication 25 GRAM(S): at 21:58

## 2024-10-17 RX ADMIN — Medication 50 MILLIGRAM(S): at 21:57

## 2024-10-17 RX ADMIN — AMLODIPINE BESYLATE 10 MILLIGRAM(S): 10 TABLET ORAL at 05:40

## 2024-10-17 RX ADMIN — Medication 667 MILLIGRAM(S): at 05:40

## 2024-10-17 RX ADMIN — IPRATROPIUM BROMIDE AND ALBUTEROL SULFATE 3 MILLILITER(S): .5; 2.5 SOLUTION RESPIRATORY (INHALATION) at 06:36

## 2024-10-17 RX ADMIN — Medication 1 TABLET(S): at 10:32

## 2024-10-17 RX ADMIN — Medication 166.67 MILLIGRAM(S): at 09:51

## 2024-10-17 RX ADMIN — Medication 667 MILLIGRAM(S): at 12:42

## 2024-10-17 RX ADMIN — Medication 1 APPLICATION(S): at 18:28

## 2024-10-17 RX ADMIN — Medication 10 MILLIGRAM(S): at 12:43

## 2024-10-17 RX ADMIN — IPRATROPIUM BROMIDE AND ALBUTEROL SULFATE 3 MILLILITER(S): .5; 2.5 SOLUTION RESPIRATORY (INHALATION) at 17:02

## 2024-10-17 RX ADMIN — ACETYLCYSTEINE 4 MILLILITER(S): 200 INHALANT RESPIRATORY (INHALATION) at 17:02

## 2024-10-17 NOTE — PROGRESS NOTE ADULT - SUBJECTIVE AND OBJECTIVE BOX
NSCU ATTENDING -- ADDITIONAL PROGRESS NOTE        Patient is a 45 y/o male with unknown past medical history (reported stroke and MI by coworkers). He presented to [facility] with AMS. Prior to this, pt was working at [employer] when he was found down by coworkers. EMS called and pt brought to [facility] ED. Intubated, sedated, started on cardene for SBPs in 200s. CT head showed pontine bleed. Transferred to NSICU for further management.  (30 Sep 2024 12:55)    ICU Vital Signs Last 24 Hrs  T(C): 36.7 (17 Oct 2024 05:27), Max: 38 (16 Oct 2024 14:03)  T(F): 98.1 (17 Oct 2024 05:27), Max: 100.4 (16 Oct 2024 14:03)  HR: 82 (17 Oct 2024 10:00) (78 - 94)  BP: 103/61 (16 Oct 2024 22:00) (103/61 - 109/63)  BP(mean): 76 (16 Oct 2024 22:00) (76 - 81)  ABP: 115/61 (17 Oct 2024 10:00) (90/62 - 138/68)  ABP(mean): 79 (17 Oct 2024 10:00) (69 - 91)  RR: 17 (17 Oct 2024 10:00) (14 - 18)  SpO2: 99% (17 Oct 2024 10:00) (96% - 100%)    O2 Parameters below as of 17 Oct 2024 10:00  Patient On (Oxygen Delivery Method): ventilator    O2 Concentration (%): 40    Mode: AC/ CMV (Assist Control/ Continuous Mandatory Ventilation)  RR (machine): 12  TV (machine): 430  FiO2: 40  PEEP: 8  ITime: 1.2  MAP: 11  PIP: 20    PHYSICAL EXAM:  Neuro: Dysconjugate gaze, absent R corneal reflex, L present, does not doll to movement of head, strong cough,   RUE extension, LUE extension, b/l  LE TFs  Pulm: Diminshed  Cards: S1, S2, RRR  GI: Soft, Nontender, Nondistended   Extremities: No LE edema       MEDICATIONS  (STANDING):  acetylcysteine 10%  Inhalation 4 milliLiter(s) Inhalation every 12 hours  albuterol/ipratropium for Nebulization 3 milliLiter(s) Nebulizer every 12 hours  amLODIPine   Tablet 10 milliGRAM(s) Oral daily  calcium acetate 667 milliGRAM(s) Oral three times a day with meals  carbidopa/levodopa  25/100 1 Tablet(s) Oral <User Schedule>  chlorhexidine 2% Cloths 1 Application(s) Topical <User Schedule>  heparin   Injectable 5000 Unit(s) SubCutaneous every 8 hours  hydrALAZINE 50 milliGRAM(s) Oral every 8 hours  labetalol 300 milliGRAM(s) Enteral Tube every 8 hours  pantoprazole  Injectable 40 milliGRAM(s) IV Push daily  petrolatum Ophthalmic Ointment 1 Application(s) Both EYES two times a day  piperacillin/tazobactam IVPB.. 3.375 Gram(s) IV Intermittent every 8 hours  polyethylene glycol 3350 17 Gram(s) Oral two times a day  senna 2 Tablet(s) Oral at bedtime  simethicone 80 milliGRAM(s) Chew every 6 hours  sodium chloride 0.9%. 1000 milliLiter(s) (100 mL/Hr) IV Continuous <Continuous>  tamsulosin 0.8 milliGRAM(s) Oral at bedtime  vancomycin  IVPB 1250 milliGRAM(s) IV Intermittent every 24 hours    MEDICATIONS  (PRN):  acetaminophen   Oral Liquid .. 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 CPAP as tolerated  Pulm toilet    CV:  SBP goal 100-160  Labetalol, hydralazine, amlodipine (hold parameters)    RENAL: CKD (superimposed ANIKA)  Na goal 135-145. Monitor BMPs  IV fluids 100cc/hr ---> KVO  FWF 100q4  Monitor for hyperkalemia  Vuong placed in AM 10/14  Doxazosin 0.8mg   Renal following       GI:  Diet: TFs at goal  PEG pending (plan for 10/23)  GI prophylaxis [] not indicated [x] PPI [] other:  Bowel regimen [] colace [x] senna [] other: miralax [x] suppository   LBM: 10/12    ENDO:   Goal euglycemia (-180)  A1c: 5.3    HEME/ONC:  VTE prophylaxis: [x] SCDs [x] chemoprophylaxis SQH    ID: Febrile; recurrent  S/P Ancef  Cultures: Blood pending, sputum GPC and Gram negative coccobacilli  Procalcitonin elevated at 0.3  Antibiotics: Vancomycin and Zosyin 10/16  monitor isabel asher     Patient remains critically ill.               NSICU ATTENDING NOTE    44 M with unknown past medical history (reported stroke and MI by coworkers). He presented to [facility] with AMS. Prior to this, pt was working at [employer] when he was found down by coworkers. EMS called and pt brought to [facility] ED. Intubated, sedated, started on cardene for SBPs in 200s. CT head showed pontine bleed. Transferred to NSICU for further management.  (30 Sep 2024 12:55).    Hospital Course/Interval Events  9/30: transferred from [facility]. A line placed. Versed dc'd. Cy Rader at bedside, states pt has family in [place], cannot confirm medications or PMH other than stroke and MI. 250cc bolus 3% given. LR switched to NS. hydralazine 25q8 started, 3% started, switched propofol to precedex   10/1: stability CTH done. Added labetalol, started TF. Palliative consulted. ethics consulted to determine surrogate. febrile 103, pan cx sent  10/2: BD 2, GEORGE overnight. TF resumed. Desatt'd to 80s, FiO2 inc. to 50. Fentanyl given, ABG, CXR ordered. Maxxed on precedex, started on propofol for DAREIN -4 - -5. Precedex dc'd. Duonebs, mucomyst, hypertonic added. 3% dc'd. Cardene dc'd. Start vanc/CTX. Increased labetalol 200q8. MRSA negative, dc'd vanc. ETT pulled back 2cm x 2, good positioning after confirmatory chest xray. Ethics attempting to establish HCP with family. Na 159, starting FW 250q6 for range 150-155.   10/3: BD3, GEORGE o/n, neuro stable. Na elevating, FW increased to 300q6. Dc'd bowel reg for diarrhea. vEEG started. SQH 5000q8 tonight.   10/4: BD 4, albumn bolus, incr. LR to 80 2/2 incr. in Cr, LR to 100cc/hr for uptrending Cr. Started 7% hypersal for 48hrs and SL atropine for inline/oral thick secretions. Dc'd CTX and started ancef for MSSA in the sputum. Nephrology consulted for CKD, f/u recs. SBP 170s, given hydralazine 10mg IVP.   10/5: BD5, o/n 10mg IVP hydralazine given for SBP 170s and started on hydralazine 25q8 via OGT. 10mg IV push labetalol for SBP > 160s. RT placed for diarrhea.   10/6: BD6, o/n FW increased to 350q4 per nephrology recs. IV tylenol for temp 100.6, SBp 160s presumed uncomfortable.   10/7: BD7, overnight pancultured for temp 101.8F.   10/8: BD8. GEORGE. Cr bumped. decreased LR to 75cc/hr. Adding simethicone ATC. incr hydralazine 50mgTID. Incr labetalol 300mgTID. Na 145, decreased FWF to 250q6. Start precedex. FENa consistent with intrinsic kidney injury. Pend repeat renal US. Retaining up to 1.3L, bladder scans q6, straight cath PRN  10/9: BD 9. GEORGE overnight. Neuro stable. abd xray for distention w non-specific gas pattern, OGT to LIWS for morning. duonebs/mucomyst to q8 for improving secretions. Changed tube feeds to Jevity 1.5 20cc/hr, low rate due to abdominal distention, nepro dense and more difficult to digest. Tolerating CPAP, confirmed by ABG.   10/10: BD 10. GEORGE overnight. Neuro stable. (+) gabriel for urinary retention on bladder scan. inc TF to goal rate of 40cc/hr. family leaning toward pursuing trach/PEG. 1/2 amp for FS 81.   10/11: BD 11. GEORGE overnight. Neuro stable. Trach/PEG consults placed.   10/12: BD 12. GEORGE overnight. Neuro stable. MRI brain complete.   10/13: BD 13. Increase flomax. Hold SQH after PM dose for trach tm. IVL.   10/14: BD 14. GEORGE overnight, remains on AC/VC. Gabriel placed for urinary retention. Dc'd free water.  S/p trach with pulm. NGT placed and CXR confirmed in good position.   10/15: BD 15, GEORGE ovn. resumed feeds. spiked 101, pan cx sent.   10/16: BD 16. GEORGE ovn. Lokelma 5mg for K+ 5.4. Started vanc q 24/zosyn for empiric PNA coverage, IVF to 100/hr. PEG held for fever.   10/17: Na low, ordered serum osm and urine osm for am. Started sinemet for neurostimulation. Increased cardura to 0.8. Started FW 100q4, dc'd IVF.       Gen Exam  GA: young middle age man, lying in bed, intubated off sedation  HEENT: conjunctival injection/discharge at the right eye, atraumatic  CV: regular hear rhythm    Pulm: diminished breathing sounds on right lower base  GI: Soft, Nontender, Nondistended   Extremities: mild lower extremities edema, peripheral pulses (radial/brachial, TP, DP)  present symmetric    Neuro Exam  Mental Status: Lethargic, trace eyes opening to repeated noxious stimulus, not maintaining eyes opening, intermittently downward gaze upon command   CN right eye with impaired abduction and adduction, left eye some abduction and adduction movements noted, spontaneous vs. purposeful downward intermittent eye movements, pupils equally round and reactive to light, corneal + bilaterally,  cough ++, breathing over vent  Motor: head/neck movement to noxious, extensor posturing to noxious at the bilateral upper extremities, triple flexion at the bilateral lower extremities   RUE extension, LUE extension, b/l  LE TFs      MEDICATIONS  (STANDING):  acetylcysteine 10%  Inhalation 4 milliLiter(s) Inhalation every 12 hours  albuterol/ipratropium for Nebulization 3 milliLiter(s) Nebulizer every 12 hours  amLODIPine   Tablet 10 milliGRAM(s) Oral daily  calcium acetate 667 milliGRAM(s) Oral three times a day with meals  carbidopa/levodopa  25/100 1 Tablet(s) Oral <User Schedule>  chlorhexidine 2% Cloths 1 Application(s) Topical <User Schedule>  heparin   Injectable 5000 Unit(s) SubCutaneous every 8 hours  hydrALAZINE 50 milliGRAM(s) Oral every 8 hours  labetalol 300 milliGRAM(s) Enteral Tube every 8 hours  pantoprazole  Injectable 40 milliGRAM(s) IV Push daily  petrolatum Ophthalmic Ointment 1 Application(s) Both EYES two times a day  piperacillin/tazobactam IVPB.. 3.375 Gram(s) IV Intermittent every 8 hours  polyethylene glycol 3350 17 Gram(s) Oral two times a day  senna 2 Tablet(s) Oral at bedtime  simethicone 80 milliGRAM(s) Chew every 6 hours  sodium chloride 0.9%. 1000 milliLiter(s) (100 mL/Hr) IV Continuous <Continuous>  tamsulosin 0.8 milliGRAM(s) Oral at bedtime  vancomycin  IVPB 1250 milliGRAM(s) IV Intermittent every 24 hours    MEDICATIONS  (PRN):  acetaminophen   Oral Liquid .. 650 milliGRAM(s) Oral every 6 hours PRN Temp greater or equal to 38C (100.4F)  oxyCODONE    IR 5 milliGRAM(s) Oral every 4 hours PRN Mild Pain (1 - 3)      CBC:            9.2    9.10  )-----------( 317      ( 10-17-24 @ 05:30 )             29.3         Chem:         ( 10-17-24 @ 05:30 )    133[L]  |  104  |  52[H]  ----------------------------<  134[H]  4.8   |  20[L]  |  3.54[H]        Liver Functions:     Type & Screen: ( 10-16-24 @ 07:34 )    ABO/Rh/Richard:  O Positive       ASSESSMENT/PLAN:  43 y/o PMH ?stroke/MI present to [facility] after collapsing at work. Decorticate posturing, vomiting, intubated for airway protection. 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 Transferred to [facility] for further management. s/p trach 10/14.       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 Monitor BMPs  IV fluids 100cc/hr ---> KVO  Start FWF 100q4  Monitor for hyperkalemia  Gabriel placed in AM 10/14  Avoid nephrotoxic medications  Monitor I/O  Doxazosin 0.8mg   Renal following     ID: Febrile/hypoxia 10/15 c/w VAP  S/P Ancef --> restarted on broad spectrum antibiotics: Vancomycin and Zosyin 10/16  Cultures: Blood pending, sputum GPC and Gram negative coccobacilli  Procalcitonin elevated at 0.3  monitor vanc level      GI: Dysphagia due to Hemorrhagic Stroke, constipation   NGT ----> PEG planning (10/23 with surgery)  Diet: enteral feeding at goal  Free water flashes 100 q4 (KVO)  GI prophylaxis while intubated   Bowel regimen ---> add suppository  LBM: 10/12    ENDO: no active issues   Goal euglycemia (-180)  A1c: 5.3    HEME/ONC:  SDCs + heparin SQ for DVT prophylaxis  Monitor H&H    Code Status: full code  Dispo: Patient remains critically ill.      Jazlyn Tomlin MD  NeuroICU Attending

## 2024-10-17 NOTE — PROGRESS NOTE ADULT - SUBJECTIVE AND OBJECTIVE BOX
S/Overnight events:  Pt seen and examined in bed, not able to offer any acute complaints at this time       Hospital Course:   9/30: transferred from [facility]. A line placed. Versed dc'd. Cy Rader at bedside, states pt has family in [place], cannot confirm medications or PMH other than stroke and MI. 250cc bolus 3% given. LR switched to NS. hydralazine 25q8 started, 3% started, switched propofol to precedex   10/1: stability CTH done. Added labetalol, started TF. Palliative consulted. ethics consulted to determine surrogate. febrile 103, pan cx sent  10/2: BD 2, GEORGE overnight. TF resumed. Desatt'd to 80s, FiO2 inc. to 50. Fentanyl given, ABG, CXR ordered. Maxxed on precedex, started on propofol for DARIEN -4 - -5. Precedex dc'd. Duonebs, mucomyst, hypertonic added. 3% dc'd. Cardene dc'd. Start vanc/CTX. Increased labetalol 200q8. MRSA negative, dc'd vanc. ETT pulled back 2cm x 2, good positioning after confirmatory chest xray. Ethics attempting to establish HCP with family. Na 159, starting FW 250q6 for range 150-155.   10/3: BD3, GEORGE o/n, neuro stable. Na elevating, FW increased to 300q6. Dc'd bowel reg for diarrhea. vEEG started. SQH 5000q8 tonight.   10/4: BD 4, albumn bolus, incr. LR to 80 2/2 incr. in Cr, LR to 100cc/hr for uptrending Cr. Started 7% hypersal for 48hrs and SL atropine for inline/oral thick secretions. Dc'd CTX and started ancef for MSSA in the sputum. Nephrology consulted for CKD, f/u recs. SBP 170s, given hydralazine 10mg IVP.   10/5: BD5, o/n 10mg IVP hydralazine given for SBP 170s and started on hydralazine 25q8 via OGT. 10mg IV push labetalol for SBP > 160s. RT placed for diarrhea.   10/6: BD6, o/n FW increased to 350q4 per nephrology recs. IV tylenol for temp 100.6, SBp 160s presumed uncomfortable.   10/7: BD7, overnight pancultured for temp 101.8F.   10/8: BD8. GEORGE. Cr bumped. decreased LR to 75cc/hr. Adding simethicone ATC. incr hydralazine 50mgTID. Incr labetalol 300mgTID. Na 145, decreased FWF to 250q6. Start precedex. FENa consistent with intrinsic kidney injury. Pend repeat renal US. Retaining up to 1.3L, bladder scans q6, straight cath PRN  10/9: BD 9. GEORGE overnight. Neuro stable. abd xray for distention w non-specific gas pattern, OGT to LIWS for morning. duonebs/mucomyst to q8 for improving secretions. Changed tube feeds to Jevity 1.5 20cc/hr, low rate due to abdominal distention, nepro dense and more difficult to digest. Tolerating CPAP, confirmed by ABG.   10/10: BD 10. GEORGE overnight. Neuro stable. (+) gabriel for urinary retention on bladder scan. inc TF to goal rate of 40cc/hr. family leaning toward pursuing trach/PEG. 1/2 amp for FS 81.   10/11: BD 11. GEORGE overnight. Neuro stable. Trach/PEG consults placed.   10/12: BD 12. GEORGE overnight. Neuro stable. MRI brain complete.   10/13: BD 13. Increase flomax. Hold SQH after PM dose for trach tm. IVL.   10/14: BD 14. GEORGE overnight, remains on AC/VC. Gabriel placed for urinary retention. Dc'd free water.  S/p trach with pulm. NGT placed and CXR confirmed in good position.   10/15: BD 15, GEORGE ovn. resumed feeds. spiked 101, pan cx sent.   10/16: BD 16. GEORGE ovn. Lokelma 5mg for K+ 5.4. Started vanc q 24/zosyn for empiric PNA coverage, IVF to 100/hr. PEG held for fever.   10/17: Na low, ordered serum osm and urine osm for am     Vital Signs Last 24 Hrs  T(C): 37.3 (17 Oct 2024 00:53), Max: 38.1 (16 Oct 2024 11:00)  T(F): 99.1 (17 Oct 2024 00:53), Max: 100.6 (16 Oct 2024 11:00)  HR: 82 (17 Oct 2024 00:00) (82 - 94)  BP: 103/61 (16 Oct 2024 22:00) (103/61 - 109/63)  BP(mean): 76 (16 Oct 2024 22:00) (76 - 81)  RR: 16 (17 Oct 2024 00:00) (10 - 18)  SpO2: 98% (17 Oct 2024 00:00) (96% - 100%)    Parameters below as of 17 Oct 2024 00:00  Patient On (Oxygen Delivery Method): ventilator    O2 Concentration (%): 40    I&O's Detail    15 Oct 2024 07:01  -  16 Oct 2024 07:00  --------------------------------------------------------  IN:    Enteral Tube Flush: 150 mL    Jevity 1.5: 520 mL    sodium chloride 0.9%: 480 mL  Total IN: 1150 mL    OUT:    Indwelling Catheter - Urethral (mL): 1980 mL  Total OUT: 1980 mL    Total NET: -830 mL      16 Oct 2024 07:01  -  17 Oct 2024 01:08  --------------------------------------------------------  IN:    Enteral Tube Flush: 460 mL    IV PiggyBack: 501 mL    IV PiggyBack: 300 mL    Jevity 1.5: 560 mL    sodium chloride 0.9%: 80 mL    sodium chloride 0.9%: 1600 mL  Total IN: 3501 mL    OUT:    Indwelling Catheter - Urethral (mL): 1420 mL  Total OUT: 1420 mL    Total NET: 2081 mL        I&O's Summary    15 Oct 2024 07:01  -  16 Oct 2024 07:00  --------------------------------------------------------  IN: 1150 mL / OUT: 1980 mL / NET: -830 mL    16 Oct 2024 07:01  -  17 Oct 2024 01:08  --------------------------------------------------------  IN: 3501 mL / OUT: 1420 mL / NET: 2081 mL        PHYSICAL EXAM:  Constitutional: Pt found in bed in NAD   ENT: PERRL, EOM not intact  Respiratory: + intubated, breathing non-labored, symmetrical chest wall movement  Cardiovascuar: RRR, no murmurs  Gastrointestinal: abdomen soft, non tender  Neurological:  AAOX0. Not FC, Not OE, nonverbal, +L corneal, -R corneal, +cough/gag  RUE: 0/5, RLE: brisk triple flex to noxious, LUE: extensor to noxious, LLE: withdraw to noxious   Pronator Drift: unable to assess   Wounds/Drains: N/A    TUBES/LINES:  [] CVC  [x] A-line  [] Lumbar Drain  [] Ventriculostomy  [x] Gabriel  [] Other    DIET:  [] NPO  [] Mechanical  [x] Tube feeds via NGT     LABS:                        11.4   13.85 )-----------( 385      ( 16 Oct 2024 05:14 )             35.6     10-16    130[L]  |  102  |  54[H]  ----------------------------<  125[H]  4.9   |  19[L]  |  3.51[H]    Ca    8.2[L]      16 Oct 2024 22:39  Phos  5.4     10-16  Mg     2.5     10-16      PT/INR - ( 16 Oct 2024 05:14 )   PT: 12.9 sec;   INR: 1.12          PTT - ( 16 Oct 2024 05:14 )  PTT:32.0 sec  Urinalysis Basic - ( 16 Oct 2024 22:39 )    Color: x / Appearance: x / SG: x / pH: x  Gluc: 125 mg/dL / Ketone: x  / Bili: x / Urobili: x   Blood: x / Protein: x / Nitrite: x   Leuk Esterase: x / RBC: x / WBC x   Sq Epi: x / Non Sq Epi: x / Bacteria: x          CAPILLARY BLOOD GLUCOSE      POCT Blood Glucose.: 100 mg/dL (16 Oct 2024 17:29)  POCT Blood Glucose.: 122 mg/dL (16 Oct 2024 11:13)      Drug Levels: [] N/A    CSF Analysis: [] N/A      Allergies    Allergy Status Unknown    Intolerances      MEDICATIONS:  Antibiotics:  piperacillin/tazobactam IVPB.. 3.375 Gram(s) IV Intermittent every 8 hours  vancomycin  IVPB 1250 milliGRAM(s) IV Intermittent every 24 hours    Neuro:  acetaminophen   Oral Liquid .. 650 milliGRAM(s) Oral every 6 hours PRN  oxyCODONE    IR 5 milliGRAM(s) Oral every 4 hours PRN    Anticoagulation:  heparin   Injectable 5000 Unit(s) SubCutaneous every 8 hours    OTHER:  acetylcysteine 10%  Inhalation 4 milliLiter(s) Inhalation every 12 hours  albuterol/ipratropium for Nebulization 3 milliLiter(s) Nebulizer every 12 hours  amLODIPine   Tablet 10 milliGRAM(s) Oral daily  chlorhexidine 2% Cloths 1 Application(s) Topical <User Schedule>  hydrALAZINE 50 milliGRAM(s) Oral every 8 hours  labetalol 300 milliGRAM(s) Enteral Tube every 8 hours  pantoprazole  Injectable 40 milliGRAM(s) IV Push daily  petrolatum Ophthalmic Ointment 1 Application(s) Both EYES two times a day  polyethylene glycol 3350 17 Gram(s) Oral two times a day  senna 2 Tablet(s) Oral at bedtime  simethicone 80 milliGRAM(s) Chew every 6 hours  tamsulosin 0.8 milliGRAM(s) Oral at bedtime    IVF:  calcium acetate 667 milliGRAM(s) Oral three times a day with meals  sodium chloride 0.9%. 1000 milliLiter(s) IV Continuous <Continuous>    CULTURES:  Culture Results:   No growth at 24 hours (10-15 @ 14:55)  Culture Results:   No growth at 72 Hours (10-12 @ 18:14)    RADIOLOGY & ADDITIONAL TESTS:      ASSESSMENT:  43 y/o PMH ?stroke/MI present to [facility] after collapsing at work. Decorticate posturing, vomiting, intubated for airway protection. 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 Transferred to [facility] for further management. s/p trach 10/14.     Neuro:  - Neuro q4/vitals q1  - pain control: Tylenol prn   - CTH 9/30: enlarged pontine hemorrhage, CTH 10/3 done read stable   - vEEG (10/3-4)- negative  - stroke core measures, stroke neuro following  - MRI brain w/ w/o contrast 10/12: parenchymal hemorrhage, acute/subacute R cerebellar stroke      CV:  - SBP <160  - HTN: amlodipine 10 mg qd, labetalol 300 mg q8h, hydralazine 50 mg q8h   - echo (9/30) EF 75%    Resp:  - trach to vent, CPAP as tolerated  - duonebs/mucomyst q12    GI:  - TF via NGT  - bowel reg held for diarrhea, RT (10/5-10/12)   - GI ppx: Protonix while intubated  - Abd distension: Simethicone 80mg q6    Renal:  - IVF  - Hyperphosphatemia: phoslo TID  - Urinary retenion: Flomax 0.8 mg at bedtime   - Gabriel (10/14- ) iso urinary retention   - CKD: trend Cr   - renal US 10/1: echogenicity c/w chronic med renal dz, repeat done 10/8: inc renal echogeicity, c/f medical renal disease w increased hydro     Endo:  - ISS, A1c 5.4    Heme:  - DVT ppx: SCDs, SQH 5000q8     ID:  - last pan cx 10/15  - Febrile, last pan cx 10/15, tx empiric PNA, vanc 1250 q 24. zosyn  - MRSA swab negative, sputum MSSA + , s/p 1 dose vanc (10/2), CTX (10/2 - 10/4), Ancef (10/4-10/14)     Dispo: NSICU, full code, pending PT/OT    D/w Dr. D'Amico

## 2024-10-17 NOTE — PROGRESS NOTE ADULT - SUBJECTIVE AND OBJECTIVE BOX
[facility] [place] [place] -- ADDITIONAL PROGRESS NOTE    Nighttime rounds were performed     Patient is a 45 y/o male with unknown past medical history (reported stroke and MI by coworkers). He presented to [facility] with AMS. Prior to this, pt was working at [employer] when he was found down by coworkers. EMS called and pt brought to [facility] ED. Intubated, sedated, started on cardene for SBPs in 200s. CT head showed pontine bleed. Transferred to NSICU for further management.  (30 Sep 2024 12:55)      ICU Vital Signs Last 24 Hrs  T(C): 37.1 (17 Oct 2024 18:34), Max: 37.5 (16 Oct 2024 22:24)  T(F): 98.8 (17 Oct 2024 18:34), Max: 99.5 (16 Oct 2024 22:24)  HR: 89 (17 Oct 2024 20:00) (78 - 90)  BP: 103/61 (16 Oct 2024 22:00) (103/61 - 109/63)  BP(mean): 76 (16 Oct 2024 22:00) (76 - 81)  ABP: 124/61 (17 Oct 2024 20:00) (90/62 - 138/70)  ABP(mean): 82 (17 Oct 2024 20:00) (70 - 93)  RR: 18 (17 Oct 2024 20:00) (12 - 18)  SpO2: 100% (17 Oct 2024 20:00) (97% - 100%)      10-16-24 @ 07:01  -  10-17-24 @ 07:00  --------------------------------------------------------  IN: 4566 mL / OUT: 2210 mL / NET: 2356 mL    10-17-24 @ 07:01  -  10-17-24 @ 20:49  --------------------------------------------------------  IN: 1506 mL / OUT: 1995 mL / NET: -489 mL      Mode: AC/ CMV (Assist Control/ Continuous Mandatory Ventilation), RR (machine): 12, TV (machine): 430, FiO2: 40, PEEP: 8, ITime: 1.2, MAP: 11, PIP: 13      PHYSICAL EXAM:  Neuro: Dysconjugate gaze, absent R corneal reflex, L present, does not doll to movement of head, strong cough,   RUE extension, LUE extension, b/l  LE TFs  Pulm: Diminshed  Cards: S1, S2, RRR  GI: Soft, Nontender, Nondistended   Extremities: No LE edema       MEDICATIONS:   acetaminophen   Oral Liquid .. 650 milliGRAM(s) Oral every 6 hours PRN  acetylcysteine 10%  Inhalation 4 milliLiter(s) Inhalation every 12 hours  albuterol/ipratropium for Nebulization 3 milliLiter(s) Nebulizer every 12 hours  amLODIPine   Tablet 10 milliGRAM(s) Oral daily  bisacodyl Suppository 10 milliGRAM(s) Rectal daily PRN  calcium acetate 667 milliGRAM(s) Oral three times a day with meals  carbidopa/levodopa  25/100 1 Tablet(s) Oral <User Schedule>  chlorhexidine 2% Cloths 1 Application(s) Topical <User Schedule>  erythromycin   Ointment 1 Application(s) Right EYE every 6 hours  heparin   Injectable 5000 Unit(s) SubCutaneous every 8 hours  hydrALAZINE 50 milliGRAM(s) Oral every 8 hours  labetalol 300 milliGRAM(s) Enteral Tube every 8 hours  oxyCODONE    IR 5 milliGRAM(s) Oral every 4 hours PRN  pantoprazole  Injectable 40 milliGRAM(s) IV Push daily  petrolatum Ophthalmic Ointment 1 Application(s) Both EYES two times a day  piperacillin/tazobactam IVPB.. 3.375 Gram(s) IV Intermittent every 8 hours  polyethylene glycol 3350 17 Gram(s) Oral two times a day  senna 2 Tablet(s) Oral at bedtime  tamsulosin 0.8 milliGRAM(s) Oral at bedtime    LABS:                       9.2    9.10  )-----------( 317      ( 17 Oct 2024 05:30 )             29.3     10-17    133[L]  |  104  |  52[H]  ----------------------------<  134[H]  4.8   |  20[L]  |  3.54[H]    Ca    8.4      17 Oct 2024 05:30  Phos  5.3     10-17  Mg     2.2     10-17        ABG - ( 17 Oct 2024 14:40 )  pH, Arterial: 7.37  pH, Blood: x     /  pCO2: 34    /  pO2: 117   / HCO3: 20    / Base Excess: -4.8  /  SaO2: 98.9        ASSESSMENT/PLAN:  71 y/o M with 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 CPAP as tolerated  Pulm toilet    CV:  SBP goal 100-160  Labetalol, hydralazine, amlodipine (hold parameters)    RENAL: CKD (superimposed ANIKA)  Na goal 135-145. Monitor BMPs  Monitor for hyperkalemia  Vuong placed in AM 10/14  Doxazosin 0.8mg   IVF: NS at 100cc/hr  Nephrology following    GI:  Diet: TFs at goal  PEG pending  GI prophylaxis [] not indicated [x] PPI [] other:  Bowel regimen [] colace [x] senna [] other: miralax  LBM: 10/12    ENDO:   Goal euglycemia (-180)  A1c: 5.3    HEME/ONC:  VTE prophylaxis: [x] SCDs [x] chemoprophylaxis SQH    ID: Febrile; recurrent  S/P Ancef  Cultures: Blood pending, sputum GPC and Gram negative coccobacilli  Procalcitonin elevated at 0.3  Antibiotics: Vancomycin and Zosyn    Patient remains critically ill.    Additional 45 minutes of critical care time.                 [facility] -- ADDITIONAL PROGRESS NOTE    Nighttime rounds were performed     Patient is a 45 y/o male with unknown past medical history (reported stroke and MI by coworkers). He presented to [facility] with AMS. Prior to this, pt was working at [employer] when he was found down by coworkers. EMS called and pt brought to [facility] ED. Intubated, sedated, started on cardene for SBPs in 200s. CT head showed pontine bleed. Transferred to NSICU for further management.  (30 Sep 2024 12:55)      ICU Vital Signs Last 24 Hrs  T(C): 37.1 (17 Oct 2024 18:34), Max: 37.5 (16 Oct 2024 22:24)  T(F): 98.8 (17 Oct 2024 18:34), Max: 99.5 (16 Oct 2024 22:24)  HR: 89 (17 Oct 2024 20:00) (78 - 90)  BP: 103/61 (16 Oct 2024 22:00) (103/61 - 109/63)  BP(mean): 76 (16 Oct 2024 22:00) (76 - 81)  ABP: 124/61 (17 Oct 2024 20:00) (90/62 - 138/70)  ABP(mean): 82 (17 Oct 2024 20:00) (70 - 93)  RR: 18 (17 Oct 2024 20:00) (12 - 18)  SpO2: 100% (17 Oct 2024 20:00) (97% - 100%)      10-16-24 @ 07:01  -  10-17-24 @ 07:00  --------------------------------------------------------  IN: 4566 mL / OUT: 2210 mL / NET: 2356 mL    10-17-24 @ 07:01  -  10-17-24 @ 20:49  --------------------------------------------------------  IN: 1506 mL / OUT: 1995 mL / NET: -489 mL      Mode: AC/ CMV (Assist Control/ Continuous Mandatory Ventilation), RR (machine): 12, TV (machine): 430, FiO2: 40, PEEP: 8, ITime: 1.2, MAP: 11, PIP: 13      PHYSICAL EXAM:  Neuro: Eyes open; awake, not tracking, corneals present bilaterally, does not doll to movement of head, strong cough,, breathing over vent  RUE extension, LUE extension, b/l  LE TFs  Not following commands  Pulm: Diminshed  Cards: S1, S2, RRR  GI: Soft, Nontender, Nondistended   Extremities: No LE edema       MEDICATIONS:   acetaminophen   Oral Liquid .. 650 milliGRAM(s) Oral every 6 hours PRN  acetylcysteine 10%  Inhalation 4 milliLiter(s) Inhalation every 12 hours  albuterol/ipratropium for Nebulization 3 milliLiter(s) Nebulizer every 12 hours  amLODIPine   Tablet 10 milliGRAM(s) Oral daily  bisacodyl Suppository 10 milliGRAM(s) Rectal daily PRN  calcium acetate 667 milliGRAM(s) Oral three times a day with meals  carbidopa/levodopa  25/100 1 Tablet(s) Oral <User Schedule>  chlorhexidine 2% Cloths 1 Application(s) Topical <User Schedule>  erythromycin   Ointment 1 Application(s) Right EYE every 6 hours  heparin   Injectable 5000 Unit(s) SubCutaneous every 8 hours  hydrALAZINE 50 milliGRAM(s) Oral every 8 hours  labetalol 300 milliGRAM(s) Enteral Tube every 8 hours  oxyCODONE    IR 5 milliGRAM(s) Oral every 4 hours PRN  pantoprazole  Injectable 40 milliGRAM(s) IV Push daily  petrolatum Ophthalmic Ointment 1 Application(s) Both EYES two times a day  piperacillin/tazobactam IVPB.. 3.375 Gram(s) IV Intermittent every 8 hours  polyethylene glycol 3350 17 Gram(s) Oral two times a day  senna 2 Tablet(s) Oral at bedtime  tamsulosin 0.8 milliGRAM(s) Oral at bedtime    LABS:                       9.2    9.10  )-----------( 317      ( 17 Oct 2024 05:30 )             29.3     10-17    133[L]  |  104  |  52[H]  ----------------------------<  134[H]  4.8   |  20[L]  |  3.54[H]    Ca    8.4      17 Oct 2024 05:30  Phos  5.3     10-17  Mg     2.2     10-17        ABG - ( 17 Oct 2024 14:40 )  pH, Arterial: 7.37  pH, Blood: x     /  pCO2: 34    /  pO2: 117   / HCO3: 20    / Base Excess: -4.8  /  SaO2: 98.9        ASSESSMENT/PLAN:  73 y/o M with 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 Follow up.  Analgesia prn  Palliative/ethics following    PULM:   S/P trach  Full vent support. CPAP as tolerated  Pulm toilet    CV:  SBP goal 100-160  Labetalol, hydralazine, amlodipine     RENAL: CKD (superimposed ANIKA). Mild hyponatremia  Na goal 135-145. Monitor BMPs  Vuong placed in AM 10/14  Doxazosin 0.8mg   Nephrology following    GI:  Diet: TFs at goal via NGT  PEG pending  GI prophylaxis [] not indicated [x] PPI [] other:  Bowel regimen [] colace [x] senna [] other: miralax  LBM: 10/17 large    ENDO:   Goal euglycemia (-180)  A1c: 5.3    HEME/ONC:  VTE prophylaxis: [x] SCDs [x] chemoprophylaxis SQH    ID: Febrile; recurrent  Cultures: Blood cultures; NGTD  Procalcitonin elevated at 0.3 ->   Antibiotics:  Zosyn->consider change to Unasyn (pending sensitivity) re: acetinobacter baumanii    Patient remains critically ill.    Additional 45 minutes of critical care time.

## 2024-10-17 NOTE — PROGRESS NOTE ADULT - ASSESSMENT
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 Patient does not have a prior hx of abdominal surgeries, is not currently on any anticoagulation and is only on SQH. Patients son had a long discussion with palliative care regarding goals of care, is understanding the chance of full recovery is poor, patient's son (SDM) would like to proceed with any measures to prolong his life which includes PEG and trach. Now S/p Bedisde Tracheostomy on 10/14 with Pulmonary. PEG tube placement will be deferred related to the increase in WBC and onset of new fevers.    PLAN:  PEG tube placement possible on 10/21 in early AM   NPO at Midnight before procedure  Pre-op labs including T/S and Coags  Rest of the care as per primary team    General surgery team 4c will continue to follow

## 2024-10-17 NOTE — PROGRESS NOTE ADULT - SUBJECTIVE AND OBJECTIVE BOX
INTERVAL HPI/OVERNIGHT EVENTS: re-started on tube feeds. Afebrile overnight.    STATUS POST:  Bedside tracheostomy on 10/14    SUBJECTIVE:  Patient seen and examined on AM rounds. VSS, Afebrile overnight. Tolerating tube feeds. Abdomen benign. WBC improving 9 from 13.    MEDICATIONS  (STANDING):  acetylcysteine 10%  Inhalation 4 milliLiter(s) Inhalation every 12 hours  albuterol/ipratropium for Nebulization 3 milliLiter(s) Nebulizer every 12 hours  amLODIPine   Tablet 10 milliGRAM(s) Oral daily  calcium acetate 667 milliGRAM(s) Oral three times a day with meals  carbidopa/levodopa  25/100 1 Tablet(s) Oral <User Schedule>  chlorhexidine 2% Cloths 1 Application(s) Topical <User Schedule>  heparin   Injectable 5000 Unit(s) SubCutaneous every 8 hours  hydrALAZINE 50 milliGRAM(s) Oral every 8 hours  labetalol 300 milliGRAM(s) Enteral Tube every 8 hours  pantoprazole  Injectable 40 milliGRAM(s) IV Push daily  petrolatum Ophthalmic Ointment 1 Application(s) Both EYES two times a day  piperacillin/tazobactam IVPB.. 3.375 Gram(s) IV Intermittent every 8 hours  polyethylene glycol 3350 17 Gram(s) Oral two times a day  senna 2 Tablet(s) Oral at bedtime  simethicone 80 milliGRAM(s) Chew every 6 hours  sodium chloride 0.9%. 1000 milliLiter(s) (100 mL/Hr) IV Continuous <Continuous>  tamsulosin 0.8 milliGRAM(s) Oral at bedtime  vancomycin  IVPB 1250 milliGRAM(s) IV Intermittent every 24 hours    MEDICATIONS  (PRN):  acetaminophen   Oral Liquid .. 650 milliGRAM(s) Oral every 6 hours PRN Temp greater or equal to 38C (100.4F)  oxyCODONE    IR 5 milliGRAM(s) Oral every 4 hours PRN Mild Pain (1 - 3)      Vital Signs Last 24 Hrs  T(C): 36.7 (17 Oct 2024 05:27), Max: 38.1 (16 Oct 2024 11:00)  T(F): 98.1 (17 Oct 2024 05:27), Max: 100.6 (16 Oct 2024 11:00)  HR: 80 (17 Oct 2024 09:00) (78 - 94)  BP: 103/61 (16 Oct 2024 22:00) (103/61 - 109/63)  BP(mean): 76 (16 Oct 2024 22:00) (76 - 81)  RR: 17 (17 Oct 2024 09:00) (14 - 18)  SpO2: 98% (17 Oct 2024 09:00) (96% - 100%)    Parameters below as of 17 Oct 2024 09:00  Patient On (Oxygen Delivery Method): ventilator    O2 Concentration (%): 40    PHYSICAL EXAM:    General: Sedated.  Neuro: Aox1, Responsive to stimulus  Cardio: S1,S2, No MRG  Pulm: Tracheostomy in place, non labored breathing.  Abdomen: soft, non-distended, non-tender, no scars noted. Has a rectal tube in place and is making BMs.   : Vuong in place  Extremities: WWP, peripheral pulses appreciated          I&O's Detail    16 Oct 2024 07:01  -  17 Oct 2024 07:00  --------------------------------------------------------  IN:    Enteral Tube Flush: 590 mL    IV PiggyBack: 501 mL    IV PiggyBack: 375 mL    Jevity 1.5: 720 mL    sodium chloride 0.9%: 80 mL    sodium chloride 0.9%: 2300 mL  Total IN: 4566 mL    OUT:    Indwelling Catheter - Urethral (mL): 2210 mL  Total OUT: 2210 mL    Total NET: 2356 mL      17 Oct 2024 07:01  -  17 Oct 2024 10:13  --------------------------------------------------------  IN:    IV PiggyBack: 359 mL    sodium chloride 0.9%: 400 mL  Total IN: 759 mL    OUT:    Indwelling Catheter - Urethral (mL): 360 mL  Total OUT: 360 mL    Total NET: 399 mL          LABS:                        9.2    9.10  )-----------( 317      ( 17 Oct 2024 05:30 )             29.3     10-17    133[L]  |  104  |  52[H]  ----------------------------<  134[H]  4.8   |  20[L]  |  3.54[H]    Ca    8.4      17 Oct 2024 05:30  Phos  5.3     10-17  Mg     2.2     10-17      PT/INR - ( 16 Oct 2024 05:14 )   PT: 12.9 sec;   INR: 1.12          PTT - ( 16 Oct 2024 05:14 )  PTT:32.0 sec  Urinalysis Basic - ( 17 Oct 2024 05:30 )    Color: x / Appearance: x / SG: x / pH: x  Gluc: 134 mg/dL / Ketone: x  / Bili: x / Urobili: x   Blood: x / Protein: x / Nitrite: x   Leuk Esterase: x / RBC: x / WBC x   Sq Epi: x / Non Sq Epi: x / Bacteria: x

## 2024-10-18 LAB
-  AMIKACIN: SIGNIFICANT CHANGE UP
-  AMPICILLIN/SULBACTAM: SIGNIFICANT CHANGE UP
-  CEFEPIME: SIGNIFICANT CHANGE UP
-  CEFTAZIDIME: SIGNIFICANT CHANGE UP
-  CIPROFLOXACIN: SIGNIFICANT CHANGE UP
-  GENTAMICIN: SIGNIFICANT CHANGE UP
-  IMIPENEM: SIGNIFICANT CHANGE UP
-  LEVOFLOXACIN: SIGNIFICANT CHANGE UP
-  MEROPENEM: SIGNIFICANT CHANGE UP
-  PIPERACILLIN/TAZOBACTAM: SIGNIFICANT CHANGE UP
-  TOBRAMYCIN: SIGNIFICANT CHANGE UP
-  TRIMETHOPRIM/SULFAMETHOXAZOLE: SIGNIFICANT CHANGE UP
ANION GAP SERPL CALC-SCNC: 11 MMOL/L — SIGNIFICANT CHANGE UP (ref 5–17)
BUN SERPL-MCNC: 48 MG/DL — HIGH (ref 7–23)
CALCIUM SERPL-MCNC: 8.9 MG/DL — SIGNIFICANT CHANGE UP (ref 8.4–10.5)
CHLORIDE SERPL-SCNC: 105 MMOL/L — SIGNIFICANT CHANGE UP (ref 96–108)
CO2 SERPL-SCNC: 20 MMOL/L — LOW (ref 22–31)
CREAT SERPL-MCNC: 3.4 MG/DL — HIGH (ref 0.5–1.3)
CULTURE RESULTS: ABNORMAL
CULTURE RESULTS: SIGNIFICANT CHANGE UP
CULTURE RESULTS: SIGNIFICANT CHANGE UP
EGFR: 22 ML/MIN/1.73M2 — LOW
EGFR: 22 ML/MIN/1.73M2 — LOW
GLUCOSE BLDC GLUCOMTR-MCNC: 98 MG/DL — SIGNIFICANT CHANGE UP (ref 70–99)
GLUCOSE SERPL-MCNC: 124 MG/DL — HIGH (ref 70–99)
HCT VFR BLD CALC: 29.6 % — LOW (ref 39–50)
HGB BLD-MCNC: 9.3 G/DL — LOW (ref 13–17)
MAGNESIUM SERPL-MCNC: 2.2 MG/DL — SIGNIFICANT CHANGE UP (ref 1.6–2.6)
MCHC RBC-ENTMCNC: 29 PG — SIGNIFICANT CHANGE UP (ref 27–34)
MCHC RBC-ENTMCNC: 31.4 GM/DL — LOW (ref 32–36)
MCV RBC AUTO: 92.2 FL — SIGNIFICANT CHANGE UP (ref 80–100)
METHOD TYPE: SIGNIFICANT CHANGE UP
METHOD TYPE: SIGNIFICANT CHANGE UP
NRBC # BLD: 0 /100 WBCS — SIGNIFICANT CHANGE UP (ref 0–0)
NRBC BLD-RTO: 0 /100 WBCS — SIGNIFICANT CHANGE UP (ref 0–0)
ORGANISM # SPEC MICROSCOPIC CNT: ABNORMAL
ORGANISM # SPEC MICROSCOPIC CNT: ABNORMAL
ORGANISM # SPEC MICROSCOPIC CNT: SIGNIFICANT CHANGE UP
PHOSPHATE SERPL-MCNC: 4.4 MG/DL — SIGNIFICANT CHANGE UP (ref 2.5–4.5)
PLATELET # BLD AUTO: 381 K/UL — SIGNIFICANT CHANGE UP (ref 150–400)
POTASSIUM SERPL-MCNC: 4.9 MMOL/L — SIGNIFICANT CHANGE UP (ref 3.5–5.3)
POTASSIUM SERPL-SCNC: 4.9 MMOL/L — SIGNIFICANT CHANGE UP (ref 3.5–5.3)
RBC # BLD: 3.21 M/UL — LOW (ref 4.2–5.8)
RBC # FLD: 13 % — SIGNIFICANT CHANGE UP (ref 10.3–14.5)
SODIUM SERPL-SCNC: 136 MMOL/L — SIGNIFICANT CHANGE UP (ref 135–145)
SPECIMEN SOURCE: SIGNIFICANT CHANGE UP
WBC # BLD: 7.85 K/UL — SIGNIFICANT CHANGE UP (ref 3.8–10.5)
WBC # FLD AUTO: 7.85 K/UL — SIGNIFICANT CHANGE UP (ref 3.8–10.5)

## 2024-10-18 PROCEDURE — 95720 EEG PHY/QHP EA INCR W/VEEG: CPT

## 2024-10-18 PROCEDURE — 99291 CRITICAL CARE FIRST HOUR: CPT

## 2024-10-18 RX ORDER — AMANTADINE HCL 100 MG
100 CAPSULE ORAL ONCE
Refills: 0 | Status: DISCONTINUED | OUTPATIENT
Start: 2024-10-18 | End: 2024-10-18

## 2024-10-18 RX ORDER — AMANTADINE HCL 100 MG
100 CAPSULE ORAL
Refills: 0 | Status: DISCONTINUED | OUTPATIENT
Start: 2024-10-19 | End: 2024-10-21

## 2024-10-18 RX ORDER — AMPICILLIN SODIUM AND SULBACTAM SODIUM 1; .5 G/1; G/1
3 INJECTION, POWDER, FOR SOLUTION INTRAMUSCULAR; INTRAVENOUS
Refills: 0 | Status: COMPLETED | OUTPATIENT
Start: 2024-10-18 | End: 2024-10-23

## 2024-10-18 RX ADMIN — ERYTHROMYCIN 1 APPLICATION(S): 5 OINTMENT OPHTHALMIC at 17:27

## 2024-10-18 RX ADMIN — Medication 1 APPLICATION(S): at 17:25

## 2024-10-18 RX ADMIN — Medication 667 MILLIGRAM(S): at 05:26

## 2024-10-18 RX ADMIN — TAMSULOSIN HYDROCHLORIDE 0.8 MILLIGRAM(S): 0.4 CAPSULE ORAL at 21:49

## 2024-10-18 RX ADMIN — Medication 1 APPLICATION(S): at 05:27

## 2024-10-18 RX ADMIN — ERYTHROMYCIN 1 APPLICATION(S): 5 OINTMENT OPHTHALMIC at 12:59

## 2024-10-18 RX ADMIN — ERYTHROMYCIN 1 APPLICATION(S): 5 OINTMENT OPHTHALMIC at 00:06

## 2024-10-18 RX ADMIN — HEPARIN SODIUM 5000 UNIT(S): 1000 INJECTION INTRAVENOUS; SUBCUTANEOUS at 05:25

## 2024-10-18 RX ADMIN — ACETYLCYSTEINE 4 MILLILITER(S): 200 INHALANT RESPIRATORY (INHALATION) at 06:20

## 2024-10-18 RX ADMIN — AMPICILLIN SODIUM AND SULBACTAM SODIUM 200 GRAM(S): 1; .5 INJECTION, POWDER, FOR SOLUTION INTRAMUSCULAR; INTRAVENOUS at 15:52

## 2024-10-18 RX ADMIN — LABETALOL HYDROCHLORIDE 300 MILLIGRAM(S): 200 TABLET, FILM COATED ORAL at 05:26

## 2024-10-18 RX ADMIN — IPRATROPIUM BROMIDE AND ALBUTEROL SULFATE 3 MILLILITER(S): .5; 2.5 SOLUTION RESPIRATORY (INHALATION) at 06:20

## 2024-10-18 RX ADMIN — POLYETHYLENE GLYCOL 3350 17 GRAM(S): 17 POWDER, FOR SOLUTION ORAL at 17:25

## 2024-10-18 RX ADMIN — LABETALOL HYDROCHLORIDE 300 MILLIGRAM(S): 200 TABLET, FILM COATED ORAL at 21:52

## 2024-10-18 RX ADMIN — Medication 50 MILLIGRAM(S): at 21:49

## 2024-10-18 RX ADMIN — HEPARIN SODIUM 5000 UNIT(S): 1000 INJECTION INTRAVENOUS; SUBCUTANEOUS at 12:58

## 2024-10-18 RX ADMIN — ERYTHROMYCIN 1 APPLICATION(S): 5 OINTMENT OPHTHALMIC at 05:26

## 2024-10-18 RX ADMIN — Medication 50 MILLIGRAM(S): at 12:58

## 2024-10-18 RX ADMIN — Medication 25 GRAM(S): at 05:25

## 2024-10-18 RX ADMIN — AMPICILLIN SODIUM AND SULBACTAM SODIUM 200 GRAM(S): 1; .5 INJECTION, POWDER, FOR SOLUTION INTRAMUSCULAR; INTRAVENOUS at 19:29

## 2024-10-18 RX ADMIN — Medication 40 MILLIGRAM(S): at 12:58

## 2024-10-18 RX ADMIN — AMLODIPINE BESYLATE 10 MILLIGRAM(S): 10 TABLET ORAL at 05:26

## 2024-10-18 RX ADMIN — AMPICILLIN SODIUM AND SULBACTAM SODIUM 200 GRAM(S): 1; .5 INJECTION, POWDER, FOR SOLUTION INTRAMUSCULAR; INTRAVENOUS at 21:49

## 2024-10-18 RX ADMIN — Medication 50 MILLIGRAM(S): at 05:26

## 2024-10-18 RX ADMIN — Medication 2 TABLET(S): at 21:49

## 2024-10-18 RX ADMIN — ACETYLCYSTEINE 4 MILLILITER(S): 200 INHALANT RESPIRATORY (INHALATION) at 16:09

## 2024-10-18 RX ADMIN — Medication 1 TABLET(S): at 05:26

## 2024-10-18 RX ADMIN — HEPARIN SODIUM 5000 UNIT(S): 1000 INJECTION INTRAVENOUS; SUBCUTANEOUS at 21:50

## 2024-10-18 RX ADMIN — Medication 1 TABLET(S): at 14:54

## 2024-10-18 RX ADMIN — POLYETHYLENE GLYCOL 3350 17 GRAM(S): 17 POWDER, FOR SOLUTION ORAL at 05:26

## 2024-10-18 RX ADMIN — IPRATROPIUM BROMIDE AND ALBUTEROL SULFATE 3 MILLILITER(S): .5; 2.5 SOLUTION RESPIRATORY (INHALATION) at 16:09

## 2024-10-18 NOTE — CHART NOTE - NSCHARTNOTEFT_GEN_A_CORE
Admitting Diagnosis:   Patient is a 44y old  Male who presents with a chief complaint of brain stem bleed (18 Oct 2024 07:26)  PAST MEDICAL & SURGICAL HISTORY:  Acute myocardial infarction  CVA (cerebral vascular accident)    Current Nutrition Order: NPO via orogastric tube: Jevity 1.5 with goal of 40mL/hour x 18 hours + 2 Banatrol per day, 2 LPS (liquid protein supplements) per day, providing 720mL total volume;     PO Intake: Good (%) [   ]  Fair (50-75%) [   ] Poor (<25%) [   ] *NPO with nasogastric tube*    GI Issues: no distention noted; BM on 10/12/24 noted; orogastric tube noted;     Pain: nonverbal indicators absent    Skin Integrity: intact, no pressure ulcers noted; pitting 2+ edema, generalized; Mookie: 13      10-17-24 @ 07:01  -  10-18-24 @ 07:00  --------------------------------------------------------  IN: 2381 mL / OUT: 3185 mL / NET: -804 mL    10-18-24 @ 07:01  -  10-18-24 @ 13:18  --------------------------------------------------------  IN: 165 mL / OUT: 335 mL / NET: -170 mL      Labs:   10-18    136  |  105  |  48[H]  ----------------------------<  124[H]  4.9   |  20[L]  |  3.40[H]    Ca    8.9      18 Oct 2024 05:28  Phos  4.4     10-18  Mg     2.2     10-18    CAPILLARY BLOOD GLUCOSE    POCT Blood Glucose.: 98 mg/dL (18 Oct 2024 11:43)    Medications:  MEDICATIONS  (STANDING):  acetylcysteine 10%  Inhalation 4 milliLiter(s) Inhalation every 12 hours  albuterol/ipratropium for Nebulization 3 milliLiter(s) Nebulizer every 12 hours  amLODIPine   Tablet 10 milliGRAM(s) Oral daily  carbidopa/levodopa  25/100 1 Tablet(s) Oral <User Schedule>  chlorhexidine 2% Cloths 1 Application(s) Topical <User Schedule>  erythromycin   Ointment 1 Application(s) Right EYE every 6 hours  heparin   Injectable 5000 Unit(s) SubCutaneous every 8 hours  hydrALAZINE 50 milliGRAM(s) Oral every 8 hours  labetalol 300 milliGRAM(s) Enteral Tube every 8 hours  pantoprazole  Injectable 40 milliGRAM(s) IV Push daily  petrolatum Ophthalmic Ointment 1 Application(s) Both EYES two times a day  polyethylene glycol 3350 17 Gram(s) Oral two times a day  senna 2 Tablet(s) Oral at bedtime  tamsulosin 0.8 milliGRAM(s) Oral at bedtime    MEDICATIONS  (PRN):  acetaminophen   Oral Liquid .. 650 milliGRAM(s) Oral every 6 hours PRN Temp greater or equal to 38C (100.4F)  bisacodyl Suppository 10 milliGRAM(s) Rectal daily PRN Constipation  oxyCODONE    IR 5 milliGRAM(s) Oral every 4 hours PRN Mild Pain (1 - 3)    Dosing Anthropometrics:   Height for BMI (FEET)	5 Feet  Height for BMI (INCHES)	8 Inch(s)  Height for BMI (CM)	172.72 Centimeter(s)  Weight for BMI (lbs)	176.4 lb  Weight for BMI (kg)	80 kg  Body Mass Index	              26.8  ideal body weight:               154 pounds, pt is ~115% of ideal body weight    Weight Change: no new weights noted in electronic medical records; recommend that nursing obtain updated weights weekly prn. RD to monitor trends.     Estimated energy needs:  Weight used for calculations	ideal body weight  Estimated Energy Needs Weight (lbs)	154 lb  Estimated Energy Needs Weight (kg)	69.8 kg  Estimated Energy Needs From (christiana/kg)	25  Estimated Energy Needs To (christiana/kg)	30  Estimated Energy Needs Calculated From (christiana/kg)	1745  Estimated Energy Needs Calculated To (christiana/kg)	2094  Weight used for calculations	ideal body weight  Estimated Protein Needs Weight (lbs)	154 lb  Estimated Protein Needs Weight (kg)	69.8 kg  Estimated Protein Needs From (g/kg)	1.2  Estimated Protein Needs To (g/kg)	1.4  Estimated Protein Needs Calculated From (g/kg)	83.76  Estimated Protein Needs Calculated To (g/kg)	97.72  Estimated Fluid Needs Weight (lbs)	154 lb  Estimated Fluid Needs Weight (kg)	69.8 kg  Estimated Fluid Needs From (ml/kg)	25  Estimated Fluid Needs To (ml/kg)	30  Estimated Fluid Needs Calculated From (ml/kg)	1745  Estimated Fluid Needs Calculated To (ml/kg)	2094  Other Calculations	Pt is >100% ideal body weight in the ICU, thus ideal body weight used for all calculations. Needs adjusted for age, clinical course, vented, ICU/critical care status.    Subjective: This is a 44 year old male, unknown past medical history (reported stroke and MI by coworkers) presented to [facility] with AMS, Pt was working at [employer] when he was found down by coworkers. EMS called and pt brought to [facility] ED. Intubated, sedated, started on cardene for SBPs in 200s. CT head showed brain stem bleed. Transferred to NSICU for further management.    Pt assessed and discussed with interdisciplinary team at interdisciplinary rounds. Pt intubated, on VC-AC ventilator mode, SpO2 97-99%, pt no longer on sedation, no pressor support, afebrile, MAPs ranging from 67-89. Pt noted with no emesis; minimal rectal tube output overnight per nursing flowsheets; noted with nondistended abdomen, nontender, soft abdomen noted with normoactive and audible bowel sounds per medical team. Nonverbal indicators of pain noted by nursing. Noted with pitting 2+ generalized edema; no documented pressure ulcers. Labs reviewed: elevated BUN (48), Creatinine (3.40), all electrolytes within normal limits, low eGFR (22), medical team is aware, RD to monitor trends. RD discussed updated recommendations with medical team so as to adequately meet his needs. Medical team receptive. RD to remain available. Please see additional information/recommendations below.    Previous Nutrition Diagnosis: Inadequate Oral Intake related to inability to meet nutritional demands via PO as evidenced by NPO with tube feedings    Active [ x ]  Resolved [   ]    If resolved, new PES:     Goal: Pt to consistently meet at least 75% of EEE via tolerated route that is consistent with goals of care during hospital stay    Nutrition Recommendations:  1. NPO  **consider the following: Jevity 1.5 via feasible route, start at 20mL/hour, increase by 10mL/hour every 4-6 hours to GOAL of 65mL/hour x 18 hours + 2 LPS (liquid protein supplement), to provide 1170mL total volume from tube feeding, 1955 calories, 105g protein, 889mL free water; this meets ~28 calories/kg ideal body weight, ~22 nonprotein calories/kg ideal body weight, 1.5g protein/kg ideal body weight (70kg); consider 180mL every 4 hours  **continue Banatrol 2x/day to promote fecal bulk**  *Maintain aspiration precautions at all times; monitor for signs/symptoms of intolerance*  2. Monitor weights (weekly), GI function, skin integrity, chemistry/labs  3. Pain and bowel regimen per medical team  4. Align nutrition with goals of care at all times    Education: deferred at this time; RD to follow up prn as appropriate.     Risk Level: High [ x ] Moderate [   ] Low [   ]. Admitting Diagnosis:   Patient is a 44y old  Male who presents with a chief complaint of brain stem bleed (18 Oct 2024 07:26)  PAST MEDICAL & SURGICAL HISTORY:  Acute myocardial infarction  CVA (cerebral vascular accident)    Current Nutrition Order: NPO via orogastric tube: Jevity 1.5 with goal of 40mL/hour x 18 hours + 2 Banatrol per day, 2 LPS (liquid protein supplements) per day, providing 720mL total volume;     PO Intake: Good (%) [   ]  Fair (50-75%) [   ] Poor (<25%) [   ] *NPO with nasogastric tube*    GI Issues: no distention noted; BM on 10/12/24 noted; orogastric tube noted;     Pain: nonverbal indicators absent    Skin Integrity: intact, no pressure ulcers noted; pitting 2+ edema, generalized; Mookie: 13      10-17-24 @ 07:01  -  10-18-24 @ 07:00  --------------------------------------------------------  IN: 2381 mL / OUT: 3185 mL / NET: -804 mL    10-18-24 @ 07:01  -  10-18-24 @ 13:18  --------------------------------------------------------  IN: 165 mL / OUT: 335 mL / NET: -170 mL      Labs:   10-18    136  |  105  |  48[H]  ----------------------------<  124[H]  4.9   |  20[L]  |  3.40[H]    Ca    8.9      18 Oct 2024 05:28  Phos  4.4     10-18  Mg     2.2     10-18    CAPILLARY BLOOD GLUCOSE    POCT Blood Glucose.: 98 mg/dL (18 Oct 2024 11:43)    Medications:  MEDICATIONS  (STANDING):  acetylcysteine 10%  Inhalation 4 milliLiter(s) Inhalation every 12 hours  albuterol/ipratropium for Nebulization 3 milliLiter(s) Nebulizer every 12 hours  amLODIPine   Tablet 10 milliGRAM(s) Oral daily  carbidopa/levodopa  25/100 1 Tablet(s) Oral <User Schedule>  chlorhexidine 2% Cloths 1 Application(s) Topical <User Schedule>  erythromycin   Ointment 1 Application(s) Right EYE every 6 hours  heparin   Injectable 5000 Unit(s) SubCutaneous every 8 hours  hydrALAZINE 50 milliGRAM(s) Oral every 8 hours  labetalol 300 milliGRAM(s) Enteral Tube every 8 hours  pantoprazole  Injectable 40 milliGRAM(s) IV Push daily  petrolatum Ophthalmic Ointment 1 Application(s) Both EYES two times a day  polyethylene glycol 3350 17 Gram(s) Oral two times a day  senna 2 Tablet(s) Oral at bedtime  tamsulosin 0.8 milliGRAM(s) Oral at bedtime    MEDICATIONS  (PRN):  acetaminophen   Oral Liquid .. 650 milliGRAM(s) Oral every 6 hours PRN Temp greater or equal to 38C (100.4F)  bisacodyl Suppository 10 milliGRAM(s) Rectal daily PRN Constipation  oxyCODONE    IR 5 milliGRAM(s) Oral every 4 hours PRN Mild Pain (1 - 3)    Dosing Anthropometrics:   Height for BMI (FEET)	5 Feet  Height for BMI (INCHES)	8 Inch(s)  Height for BMI (CM)	172.72 Centimeter(s)  Weight for BMI (lbs)	176.4 lb  Weight for BMI (kg)	80 kg  Body Mass Index	              26.8  ideal body weight:               154 pounds, pt is ~115% of ideal body weight    Weight Change: no new weights noted in electronic medical records; recommend that nursing obtain updated weights weekly prn. RD to monitor trends.     Estimated energy needs:  Weight used for calculations	ideal body weight  Estimated Energy Needs Weight (lbs)	154 lb  Estimated Energy Needs Weight (kg)	69.8 kg  Estimated Energy Needs From (christiana/kg)	25  Estimated Energy Needs To (christiana/kg)	30  Estimated Energy Needs Calculated From (christiana/kg)	1745  Estimated Energy Needs Calculated To (christiana/kg)	2094  Weight used for calculations	ideal body weight  Estimated Protein Needs Weight (lbs)	154 lb  Estimated Protein Needs Weight (kg)	69.8 kg  Estimated Protein Needs From (g/kg)	1.2  Estimated Protein Needs To (g/kg)	1.4  Estimated Protein Needs Calculated From (g/kg)	83.76  Estimated Protein Needs Calculated To (g/kg)	97.72  Estimated Fluid Needs Weight (lbs)	154 lb  Estimated Fluid Needs Weight (kg)	69.8 kg  Estimated Fluid Needs From (ml/kg)	25  Estimated Fluid Needs To (ml/kg)	30  Estimated Fluid Needs Calculated From (ml/kg)	1745  Estimated Fluid Needs Calculated To (ml/kg)	2094  Other Calculations	Pt is >100% ideal body weight in the ICU, thus ideal body weight used for all calculations. Needs adjusted for age, clinical course, vented, ICU/critical care status.    Subjective: This is a 44 year old male, unknown past medical history (reported stroke and MI by coworkers) presented to [facility] with AMS, Pt was working at [employer] when he was found down by coworkers. EMS called and pt brought to [facility] ED. Intubated, sedated, started on cardene for SBPs in 200s. CT head showed brain stem bleed. Transferred to NSICU for further management.    Pt assessed and discussed with interdisciplinary team at interdisciplinary rounds. Pt intubated, on VC-AC ventilator mode, SpO2 97-99%, pt no longer on sedation, no pressor support, afebrile, MAPs ranging from 67-89. Pt noted with no emesis; minimal rectal tube output overnight per nursing flowsheets; noted with nondistended abdomen, nontender, soft abdomen noted with normoactive and audible bowel sounds per medical team. Nonverbal indicators of pain noted by nursing. Noted with pitting 2+ generalized edema; no documented pressure ulcers. Labs reviewed: elevated BUN (48), Creatinine (3.40), all electrolytes within normal limits, low eGFR (22), medical team is aware, RD to monitor trends. RD discussed updated recommendations with medical team so as to adequately meet his needs. Medical team receptive. RD to remain available. Please see additional information/recommendations below.    Previous Nutrition Diagnosis: Inadequate Oral Intake related to inability to meet nutritional demands via PO as evidenced by NPO with tube feedings    Active [ x ]  Resolved [   ]    If resolved, new PES:     Goal: Pt to consistently meet at least 75% of EEE via tolerated route that is consistent with goals of care during hospital stay    Nutrition Recommendations:  1. NPO  **consider the following: Jevity 1.5 via feasible route, start at 20mL/hour, increase by 10mL/hour every 4-6 hours to GOAL of 65mL/hour x 18 hours + 2 LPS (liquid protein supplement), to provide 1170mL total volume from tube feeding, 1955 calories, 105g protein, 889mL free water; this meets ~28 calories/kg ideal body weight, ~22 nonprotein calories/kg ideal body weight, 1.5g protein/kg ideal body weight (70kg); consider 180mL every 4 hours  **continue Banatrol 2x/day to promote fecal bulk**  *Maintain aspiration precautions at all times; monitor for signs/symptoms of intolerance*  2. Monitor weights (weekly), GI function, skin integrity, chemistry/labs  **electrolytes, BMP, glucose, CBC per MD discretion *renal indices**  3. Pain and bowel regimen per medical team  4. Align nutrition with goals of care at all times    Education: deferred at this time; RD to follow up prn as appropriate.     Risk Level: High [ x ] Moderate [   ] Low [   ].

## 2024-10-18 NOTE — EEG REPORT - NS EEG TEXT BOX
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     Diagnosis Code:  R56.9 convulsions/seizure    Pertinent Medication:  n/a    Acquisition Details:  Electroencephalography was acquired using a minimum of 21 channels on an [employer] system v 9.3.1 with electrode placement according to the standard International 10-20 system following ACNS (American Clinical Neurophysiology Society) guidelines.  Anterior temporal T1 and T2 electrodes were utilized whenever possible.  The [employer] automated spike & seizure detections were all reviewed in detail, in addition to the entire raw EEG.    Findings:  Day 1:  10/17/2024, 5:05:07 PM to next morning at 07:00 AM   Background:  continuous, with predominantly delta frequencies, at times with overriding alpha and beta frequency activity.  Generalized Slowing: severe  Symmetry/Focality: No persistent asymmetries of voltage or frequency.      Voltage:  Normal (20+ uV)  Organization:  Appropriate anterior-posterior gradient  Posterior Dominant Rhythm:  No clear PDR   Sleep:  Rudimentary but likely N2 transients present.  Variability:   Yes		Reactivity:  No    Spontaneous Activity:  No epileptiform discharges     Events: No electrographic seizures or significant clinical events occurred during this study.  Provocations:  •	Hyperventilation: was not performed.  •	Photic stimulation: was not performed.    Daily Summary:    •	Severe generalized background slowing, but with variability, state changes, and sleep transients.    •	These findings are consistent with diffuse cerebral dysfunction.  •	There were no findings of active epilepsy, however this alone does not rule out the diagnosis.         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     Diagnosis Code:  R56.9 convulsions/seizure    Pertinent Medication:  n/a    Acquisition Details:  Electroencephalography was acquired using a minimum of 21 channels on an [employer] system v 9.3.1 with electrode placement according to the standard International 10-20 system following ACNS (American Clinical Neurophysiology Society) guidelines.  Anterior temporal T1 and T2 electrodes were utilized whenever possible.  The XLTEK automated spike & seizure detections were all reviewed in detail, in addition to the entire raw EEG.    Findings:    Day 1:  10/17/2024, 5:05:07 PM to next morning at 07:00 AM   Background:  continuous, with predominantly delta frequencies, at times with overriding alpha and beta frequency activity.  Generalized Slowing: severe  Symmetry/Focality: No persistent asymmetries of voltage or frequency.      Voltage:  Normal (20+ uV)  Organization:  Appropriate anterior-posterior gradient  Posterior Dominant Rhythm:  No clear PDR   Sleep:  Rudimentary but likely N2 transients present.  Variability:   Yes		Reactivity:  Yes    Spontaneous Activity:  No epileptiform discharges     Events: No electrographic seizures or significant clinical events occurred during this study.  Provocations:  •	Hyperventilation: was not performed.  •	Photic stimulation: was not performed.    Daily Summary:    •	Severe generalized background slowing, but with variability and rare episodes of reactivity.  •	These findings are consistent with diffuse cerebral dysfunction.  •	There were no findings of active epilepsy, however this alone does not rule out the diagnosis.         Kandis Ball MD  Attending Neurologist, [facility] Epilepsy Program

## 2024-10-18 NOTE — PROGRESS NOTE ADULT - SUBJECTIVE AND OBJECTIVE BOX
NSICU ATTENDING NOTE    44 M with unknown past medical history (reported stroke and MI by coworkers). He presented to [facility] with AMS. Prior to this, pt was working at [employer] when he was found down by coworkers. EMS called and pt brought to [facility] ED. Intubated, sedated, started on cardene for SBPs in 200s. CT head showed pontine bleed. Transferred to NSICU for further management.  (30 Sep 2024 12:55).    Hospital Course/Interval Events  9/30: transferred from [facility]. A line placed. Versed dc'd. Cy Rader at bedside, states pt has family in [place], cannot confirm medications or PMH other than stroke and MI. 250cc bolus 3% given. LR switched to NS. hydralazine 25q8 started, 3% started, switched propofol to precedex   10/1: stability CTH done. Added labetalol, started TF. Palliative consulted. ethics consulted to determine surrogate. febrile 103, pan cx sent  10/2: BD 2, GEORGE overnight. TF resumed. Desatt'd to 80s, FiO2 inc. to 50. Fentanyl given, ABG, CXR ordered. Maxxed on precedex, started on propofol for DARIEN -4 - -5. Precedex dc'd. Duonebs, mucomyst, hypertonic added. 3% dc'd. Cardene dc'd. Start vanc/CTX. Increased labetalol 200q8. MRSA negative, dc'd vanc. ETT pulled back 2cm x 2, good positioning after confirmatory chest xray. Ethics attempting to establish HCP with family. Na 159, starting FW 250q6 for range 150-155.   10/3: BD3, GEORGE o/n, neuro stable. Na elevating, FW increased to 300q6. Dc'd bowel reg for diarrhea. vEEG started. SQH 5000q8 tonight.   10/4: BD 4, albumn bolus, incr. LR to 80 2/2 incr. in Cr, LR to 100cc/hr for uptrending Cr. Started 7% hypersal for 48hrs and SL atropine for inline/oral thick secretions. Dc'd CTX and started ancef for MSSA in the sputum. Nephrology consulted for CKD, f/u recs. SBP 170s, given hydralazine 10mg IVP.   10/5: BD5, o/n 10mg IVP hydralazine given for SBP 170s and started on hydralazine 25q8 via OGT. 10mg IV push labetalol for SBP > 160s. RT placed for diarrhea.   10/6: BD6, o/n FW increased to 350q4 per nephrology recs. IV tylenol for temp 100.6, SBp 160s presumed uncomfortable.   10/7: BD7, overnight pancultured for temp 101.8F.   10/8: BD8. GEORGE. Cr bumped. decreased LR to 75cc/hr. Adding simethicone ATC. incr hydralazine 50mgTID. Incr labetalol 300mgTID. Na 145, decreased FWF to 250q6. Start precedex. FENa consistent with intrinsic kidney injury. Pend repeat renal US. Retaining up to 1.3L, bladder scans q6, straight cath PRN  10/9: BD 9. GEORGE overnight. Neuro stable. abd xray for distention w non-specific gas pattern, OGT to LIWS for morning. duonebs/mucomyst to q8 for improving secretions. Changed tube feeds to Jevity 1.5 20cc/hr, low rate due to abdominal distention, nepro dense and more difficult to digest. Tolerating CPAP, confirmed by ABG.   10/10: BD 10. GEORGE overnight. Neuro stable. (+) gabriel for urinary retention on bladder scan. inc TF to goal rate of 40cc/hr. family leaning toward pursuing trach/PEG. 1/2 amp for FS 81.   10/11: BD 11. GEORGE overnight. Neuro stable. Trach/PEG consults placed.   10/12: BD 12. GEORGE overnight. Neuro stable. MRI brain complete.   10/13: BD 13. Increase flomax. Hold SQH after PM dose for trach tm. IVL.   10/14: BD 14. GEORGE overnight, remains on AC/VC. Gabriel placed for urinary retention. Dc'd free water.  S/p trach with pulm. NGT placed and CXR confirmed in good position.   10/15: BD 15, GEORGE ovn. resumed feeds. spiked 101, pan cx sent.   10/16: BD 16. GEORGE ovn. Lokelma 5mg for K+ 5.4. Started vanc q 24/zosyn for empiric PNA coverage, IVF to 100/hr. PEG held for fever.   10/17: Na low, ordered serum osm and urine osm for am. Started sinemet for neurostimulation. Increased cardura to 0.8. Started FW 100q4, dc'd IVF.       Gen Exam  GA: young middle age man, lying in bed, intubated off sedation  HEENT: conjunctival injection/discharge at the right eye, atraumatic  CV: regular hear rhythm    Pulm: diminished breathing sounds on right lower base  GI: Soft, Nontender, Nondistended   Extremities: mild lower extremities edema, peripheral pulses (radial/brachial, TP, DP)  present symmetric    Neuro Exam  Mental Status: Lethargic, trace eyes opening to repeated noxious stimulus, not maintaining eyes opening, intermittently downward gaze upon command   CN right eye with impaired abduction and adduction, left eye some abduction and adduction movements noted, spontaneous vs. purposeful downward intermittent eye movements, pupils equally round and reactive to light, corneal + bilaterally,  cough ++, breathing over vent  Motor: head/neck movement to noxious, extensor posturing to noxious at the bilateral upper extremities, triple flexion at the bilateral lower extremities   RUE extension, LUE extension, b/l  LE TFs      MEDICATIONS  (STANDING):  acetylcysteine 10%  Inhalation 4 milliLiter(s) Inhalation every 12 hours  albuterol/ipratropium for Nebulization 3 milliLiter(s) Nebulizer every 12 hours  amLODIPine   Tablet 10 milliGRAM(s) Oral daily  calcium acetate 667 milliGRAM(s) Oral three times a day with meals  carbidopa/levodopa  25/100 1 Tablet(s) Oral <User Schedule>  chlorhexidine 2% Cloths 1 Application(s) Topical <User Schedule>  heparin   Injectable 5000 Unit(s) SubCutaneous every 8 hours  hydrALAZINE 50 milliGRAM(s) Oral every 8 hours  labetalol 300 milliGRAM(s) Enteral Tube every 8 hours  pantoprazole  Injectable 40 milliGRAM(s) IV Push daily  petrolatum Ophthalmic Ointment 1 Application(s) Both EYES two times a day  piperacillin/tazobactam IVPB.. 3.375 Gram(s) IV Intermittent every 8 hours  polyethylene glycol 3350 17 Gram(s) Oral two times a day  senna 2 Tablet(s) Oral at bedtime  simethicone 80 milliGRAM(s) Chew every 6 hours  sodium chloride 0.9%. 1000 milliLiter(s) (100 mL/Hr) IV Continuous <Continuous>  tamsulosin 0.8 milliGRAM(s) Oral at bedtime  vancomycin  IVPB 1250 milliGRAM(s) IV Intermittent every 24 hours    MEDICATIONS  (PRN):  acetaminophen   Oral Liquid .. 650 milliGRAM(s) Oral every 6 hours PRN Temp greater or equal to 38C (100.4F)  oxyCODONE    IR 5 milliGRAM(s) Oral every 4 hours PRN Mild Pain (1 - 3)      CBC:            9.2    9.10  )-----------( 317      ( 10-17-24 @ 05:30 )             29.3         Chem:         ( 10-17-24 @ 05:30 )    133[L]  |  104  |  52[H]  ----------------------------<  134[H]  4.8   |  20[L]  |  3.54[H]        Liver Functions:     Type & Screen: ( 10-16-24 @ 07:34 )    ABO/Rh/Richard:  O Positive       ASSESSMENT/PLAN:  43 y/o PMH ?stroke/MI present to [facility] after collapsing at work. Decorticate posturing, vomiting, intubated for airway protection. 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 Transferred to [facility] for further management. s/p trach 10/14.       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 Monitor BMPs  IV fluids 100cc/hr ---> KVO  Start FWF 100q4  Monitor for hyperkalemia  Gabriel placed in AM 10/14  Avoid nephrotoxic medications  Monitor I/O  Doxazosin 0.8mg   Renal following     ID: Febrile/hypoxia 10/15 c/w VAP  S/P Ancef --> restarted on broad spectrum antibiotics: Vancomycin and Zosyin 10/16  Cultures: Blood pending, sputum GPC and Gram negative coccobacilli  Procalcitonin elevated at 0.3  monitor vanc level      GI: Dysphagia due to Hemorrhagic Stroke, constipation   NGT ----> PEG planning (10/23 with surgery)  Diet: enteral feeding at goal  Free water flashes 100 q4 (KVO)  GI prophylaxis while intubated   Bowel regimen ---> add suppository  LBM: 10/12    ENDO: no active issues   Goal euglycemia (-180)  A1c: 5.3    HEME/ONC:  SDCs + heparin SQ for DVT prophylaxis  Monitor H&H    Code Status: full code  Dispo: Patient remains critically ill.      Jazlyn Tomlin MD  NeuroICU Attending                NSICU ATTENDING NOTE    44 M with unknown past medical history (reported stroke and MI by coworkers). He presented to [facility] with AMS. Prior to this, pt was working at [employer] when he was found down by coworkers. EMS called and pt brought to [facility] ED. Intubated, sedated, started on cardene for SBPs in 200s. CT head showed pontine bleed. Transferred to NSICU for further management.  (30 Sep 2024 12:55).    Hospital Course/Interval Events  9/30: transferred from [facility]. A line placed. Versed dc'd. Cy Rader at bedside, states pt has family in [place], cannot confirm medications or PMH other than stroke and MI. 250cc bolus 3% given. LR switched to NS. hydralazine 25q8 started, 3% started, switched propofol to precedex   10/1: stability CTH done. Added labetalol, started TF. Palliative consulted. ethics consulted to determine surrogate. febrile 103, pan cx sent  10/2: BD 2, GEORGE overnight. TF resumed. Desatt'd to 80s, FiO2 inc. to 50. Fentanyl given, ABG, CXR ordered. Maxxed on precedex, started on propofol for DARIEN -4 - -5. Precedex dc'd. Duonebs, mucomyst, hypertonic added. 3% dc'd. Cardene dc'd. Start vanc/CTX. Increased labetalol 200q8. MRSA negative, dc'd vanc. ETT pulled back 2cm x 2, good positioning after confirmatory chest xray. Ethics attempting to establish HCP with family. Na 159, starting FW 250q6 for range 150-155.   10/3: BD3, GEORGE o/n, neuro stable. Na elevating, FW increased to 300q6. Dc'd bowel reg for diarrhea. vEEG started. SQH 5000q8 tonight.   10/4: BD 4, albumn bolus, incr. LR to 80 2/2 incr. in Cr, LR to 100cc/hr for uptrending Cr. Started 7% hypersal for 48hrs and SL atropine for inline/oral thick secretions. Dc'd CTX and started ancef for MSSA in the sputum. Nephrology consulted for CKD, f/u recs. SBP 170s, given hydralazine 10mg IVP.   10/5: BD5, o/n 10mg IVP hydralazine given for SBP 170s and started on hydralazine 25q8 via OGT. 10mg IV push labetalol for SBP > 160s. RT placed for diarrhea.   10/6: BD6, o/n FW increased to 350q4 per nephrology recs. IV tylenol for temp 100.6, SBp 160s presumed uncomfortable.   10/7: BD7, overnight pancultured for temp 101.8F.   10/8: BD8. GEORGE. Cr bumped. decreased LR to 75cc/hr. Adding simethicone ATC. incr hydralazine 50mgTID. Incr labetalol 300mgTID. Na 145, decreased FWF to 250q6. Start precedex. FENa consistent with intrinsic kidney injury. Pend repeat renal US. Retaining up to 1.3L, bladder scans q6, straight cath PRN  10/9: BD 9. GEORGE overnight. Neuro stable. abd xray for distention w non-specific gas pattern, OGT to LIWS for morning. duonebs/mucomyst to q8 for improving secretions. Changed tube feeds to Jevity 1.5 20cc/hr, low rate due to abdominal distention, nepro dense and more difficult to digest. Tolerating CPAP, confirmed by ABG.   10/10: BD 10. GEORGE overnight. Neuro stable. (+) gabriel for urinary retention on bladder scan. inc TF to goal rate of 40cc/hr. family leaning toward pursuing trach/PEG. 1/2 amp for FS 81.   10/11: BD 11. GEORGE overnight. Neuro stable. Trach/PEG consults placed.   10/12: BD 12. GEORGE overnight. Neuro stable. MRI brain complete.   10/13: BD 13. Increase flomax. Hold SQH after PM dose for trach tm. IVL.   10/14: BD 14. GEORGE overnight, remains on AC/VC. Gabriel placed for urinary retention. Dc'd free water.  S/p trach with pulm. NGT placed and CXR confirmed in good position.   10/15: BD 15, GEORGE ovn. resumed feeds. spiked 101, pan cx sent.   10/16: BD 16. GEORGE ovn. Lokelma 5mg for K+ 5.4. Started vanc q 24/zosyn for empiric PNA coverage, IVF to 100/hr. PEG held for fever.   10/17: Na low, ordered serum osm and urine osm for am. Started sinemet for neurostimulation. Increased cardura to 0.8. Started FW 100q4, dc'd IVF.   10/18: BD 18, GEORGE overnight, neuro stable. Amantadine added for neurostim.       MEDICATIONS  (STANDING):  acetylcysteine 10%  Inhalation 4 milliLiter(s) Inhalation every 12 hours  albuterol/ipratropium for Nebulization 3 milliLiter(s) Nebulizer every 12 hours  amLODIPine   Tablet 10 milliGRAM(s) Oral daily  ampicillin/sulbactam  IVPB 3 Gram(s) IV Intermittent <User Schedule>  carbidopa/levodopa  25/100 1 Tablet(s) Oral <User Schedule>  chlorhexidine 2% Cloths 1 Application(s) Topical <User Schedule>  erythromycin   Ointment 1 Application(s) Right EYE every 6 hours  heparin   Injectable 5000 Unit(s) SubCutaneous every 8 hours  hydrALAZINE 50 milliGRAM(s) Oral every 8 hours  labetalol 300 milliGRAM(s) Enteral Tube every 8 hours  pantoprazole  Injectable 40 milliGRAM(s) IV Push daily  petrolatum Ophthalmic Ointment 1 Application(s) Both EYES two times a day  polyethylene glycol 3350 17 Gram(s) Oral two times a day  senna 2 Tablet(s) Oral at bedtime  tamsulosin 0.8 milliGRAM(s) Oral at bedtime    MEDICATIONS  (PRN):  acetaminophen   Oral Liquid .. 650 milliGRAM(s) Oral every 6 hours PRN Temp greater or equal to 38C (100.4F)  bisacodyl Suppository 10 milliGRAM(s) Rectal daily PRN Constipation  oxyCODONE    IR 5 milliGRAM(s) Oral every 4 hours PRN Mild Pain (1 - 3)    Labs  CBC:            9.3    7.85  )-----------( 381      ( 10-18-24 @ 05:28 )             29.6         Chem:         ( 10-18-24 @ 05:28 )    136  |  105  |  48[H]  ----------------------------<  124[H]  4.9   |  20[L]  |  3.40[H]      ICU Vital Signs Last 24 Hrs  T(C): 37.2 (18 Oct 2024 13:56), Max: 37.5 (17 Oct 2024 21:39)  T(F): 99 (18 Oct 2024 13:56), Max: 99.5 (17 Oct 2024 21:39)  HR: 82 (18 Oct 2024 12:05) (82 - 95)  BP: 105/64 (18 Oct 2024 09:00) (105/64 - 105/64)  BP(mean): --  ABP: 104/58 (18 Oct 2024 11:00) (99/52 - 138/70)  ABP(mean): 74 (18 Oct 2024 11:00) (67 - 93)  RR: 17 (18 Oct 2024 12:05) (12 - 20)  SpO2: 98% (18 Oct 2024 12:05) (97% - 100%)    O2 Parameters below as of 18 Oct 2024 12:05  Patient On (Oxygen Delivery Method): ventilator    O2 Concentration (%): 40    VENT ORDERS:   Mechanical Vent - Volume Ctrl Settings: Routine  Ventilator Mode: AC/CMV  Targeted SpO2 Range (%): 88-97  Tidal Volume:  430   Respiratory Rate:  12   FiO2:  40  PEEP:  8      Physical Exam  GA: young middle age man, lying in bed, intubated off sedation  HEENT: conjunctival injection/discharge at the right eye, atraumatic  CV: regular hear rhythm    Pulm: diminished breathing sounds on right lower base  GI: Soft, Nontender, Nondistended   Extremities: mild lower extremities edema, peripheral pulses (radial/brachial, TP, DP)  present symmetric    Neurological Exam  Mental Status: awake/eyes open, maintaining eyes opening spontensouly, intermittent downward gaze, not convincingly following commands (attempted to ask to look downward or wiggle fingers in [place])  CNs: Disconjugated gaze, left eye some abduction and adduction movements noted, spontaneous vs. purposeful downward intermittent eye movements, pupils equally round and reactive to light, corneal + L >> R,  cough ++, breathing over vent  Motor: head/neck movement to noxious, extensor posturing to noxious at the bilateral upper extremities, triple flexion at the bilateral lower extremities           ASSESSMENT/PLAN:  43 y/o PMH ?stroke/MI present to [facility] after collapsing at work. Decorticate posturing, vomiting, intubated for airway protection. 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 Transferred to [facility] for further management. s/p trach 10/14.       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?, chronic microvascular changes, prior ischemic strokes   Q4 neurochecks  vEEG ---> can discontinue (preliminary report - with predominant delta slowing with superimposed geo and beta/some reactivity present   Sinemet and Amantadine to promote awakening   Sedation: None  Analgesia prn  Palliative/ethics following    CV: Uncontrolled hypertension; ECHO 9/30 hyperdynamic left ventricular function EF 70%   SBP goal 100-160  Labetalol, hydralazine, amlodipine (hold parameters)  Telemetry monitoring     PULM: Intubated for airway protection given poor mental status; S/P trach; vent associated PNA  Full vent support ---> PS as tolerated  Pulm toilet/chest PT/Nebs  pulse-oxymetry monitoring     RENAL: ANIKA on CKD - - > now improving   Euvolemia/normonatremia goal  KVO  continue FWF 100q4  Monitor for hyperkalemia  Gabriel placed in AM 10/14 ---> trial of void   Avoid nephrotoxic medications  Monitor I/O  Doxazosin 0.8mg   Renal following     ID: Febrile/hypoxia 10/15 c/w Ventilator Associated PNA ++Acitonobacter B. ++MSSA   S/P Ancef --> restarted on broad spectrum antibiotics on 10/16 Vancomycin and Zosyin --> given sensitivity narrow to Unasyn (renally dosed)  Cultures reviewed   monitor WBC and fever curve     GI: Dysphagia due to Hemorrhagic Stroke, constipation   NGT ----> PEG planning (10/23 with surgery)  Diet: enteral feeding at goal  Free water flashes 100 q4 (KVO)  GI prophylaxis while intubated   Bowel regimen ---> add suppository  LBM: 10/12    ENDO: no active issues   Goal euglycemia (-180)  A1c: 5.3    HEME/ONC:  SDCs + heparin SQ for DVT prophylaxis  Monitor H&H    Code Status: full code  Dispo: NeuroICU   Family/Chauncey (patient's son) updated at bedside with . He understands the current clinical condition of his father and would like for him to continue receiving full life sustaining measures.

## 2024-10-18 NOTE — PROGRESS NOTE ADULT - SUBJECTIVE AND OBJECTIVE BOX
[facility] [place] -- ADDITIONAL PROGRESS NOTE    Nighttime rounds were performed     Patient is a 43 y/o male with unknown past medical history (reported stroke and MI by coworkers). He presented to [facility] with AMS. Prior to this, pt was working at [employer] when he was found down by coworkers. EMS called and pt brought to [facility] ED. Intubated, sedated, started on cardene for SBPs in 200s. CT head showed pontine bleed. Transferred to NSICU for further management.  (30 Sep 2024 12:55)        ICU Vital Signs Last 24 Hrs  T(C): 37 (18 Oct 2024 17:45), Max: 37.5 (17 Oct 2024 21:39)  T(F): 98.6 (18 Oct 2024 17:45), Max: 99.5 (17 Oct 2024 21:39)  HR: 83 (18 Oct 2024 19:00) (82 - 95)  BP: 102/59 (18 Oct 2024 13:00) (102/59 - 105/64)  BP(mean): 76 (18 Oct 2024 13:00) (76 - 76)  ABP: 127/72 (18 Oct 2024 19:00) (99/52 - 134/69)  ABP(mean): 91 (18 Oct 2024 19:00) (67 - 91)  RR: 12 (18 Oct 2024 19:00) (12 - 20)  SpO2: 99% (18 Oct 2024 19:00) (97% - 100%)      10-17-24 @ 07:01  -  10-18-24 @ 07:00  --------------------------------------------------------  IN: 2381 mL / OUT: 3185 mL / NET: -804 mL    10-18-24 @ 07:01  -  10-18-24 @ 20:19  --------------------------------------------------------  IN: 925 mL / OUT: 635 mL / NET: 290 mL      Mode: AC/ CMV (Assist Control/ Continuous Mandatory Ventilation), RR (machine): 12, TV (machine): 430, FiO2: 40, PEEP: 8, ITime: 1.2, MAP: 11, PIP: 17    PHYSICAL EXAM:  Neuro: Eyes open; awake, not tracking, corneals present bilaterally, does not doll to movement of head, +cough, overbreathes  B/L uppers extends, b/l  LE TFs  Not following commands  Pulm: Diminshed  Cards: S1, S2, RRR  GI: Soft, Nontender, Nondistended   Extremities: No LE edema       MEDICATIONS:   acetaminophen   Oral Liquid .. 650 milliGRAM(s) Oral every 6 hours PRN  acetylcysteine 10%  Inhalation 4 milliLiter(s) Inhalation every 12 hours  albuterol/ipratropium for Nebulization 3 milliLiter(s) Nebulizer every 12 hours  amLODIPine   Tablet 10 milliGRAM(s) Oral daily  ampicillin/sulbactam  IVPB 3 Gram(s) IV Intermittent <User Schedule>  bisacodyl Suppository 10 milliGRAM(s) Rectal daily PRN  carbidopa/levodopa  25/100 1 Tablet(s) Oral <User Schedule>  chlorhexidine 2% Cloths 1 Application(s) Topical <User Schedule>  erythromycin   Ointment 1 Application(s) Right EYE every 6 hours  heparin   Injectable 5000 Unit(s) SubCutaneous every 8 hours  hydrALAZINE 50 milliGRAM(s) Oral every 8 hours  labetalol 300 milliGRAM(s) Enteral Tube every 8 hours  oxyCODONE    IR 5 milliGRAM(s) Oral every 4 hours PRN  pantoprazole  Injectable 40 milliGRAM(s) IV Push daily  petrolatum Ophthalmic Ointment 1 Application(s) Both EYES two times a day  polyethylene glycol 3350 17 Gram(s) Oral two times a day  senna 2 Tablet(s) Oral at bedtime  tamsulosin 0.8 milliGRAM(s) Oral at bedtime    LABS:                     9.3    7.85  )-----------( 381      ( 18 Oct 2024 05:28 )             29.6     10-18    136  |  105  |  48[H]  ----------------------------<  124[H]  4.9   |  20[L]  |  3.40[H]    Ca    8.9      18 Oct 2024 05:28  Phos  4.4     10-18  Mg     2.2     10-18        ABG - ( 17 Oct 2024 14:40 )  pH, Arterial: 7.37  pH, Blood: x     /  pCO2: 34    /  pO2: 117   / HCO3: 20    / Base Excess: -4.8  /  SaO2: 98.9          ASSESSMENT/PLAN:  71 y/o M with 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 CPAP as tolerated  Pulm toilet    CV:  SBP goal 100-160  Labetalol, hydralazine, amlodipine     RENAL: CKD (superimposed ANIKA). Mild hyponatremia  Na goal 135-145. Monitor BMPs  Vuong placed in AM 10/14  Doxazosin 0.8mg   Nephrology following    GI:  Diet: TFs at goal via NGT  PEG pending  GI prophylaxis [] not indicated [x] PPI [] other:  Bowel regimen [] colace [x] senna [] other: miralax  LBM: 10/17 large    ENDO:   Goal euglycemia (-180)  A1c: 5.3    HEME/ONC:  VTE prophylaxis: [x] SCDs [x] chemoprophylaxis SQH    ID: Febrile; recurrent  Cultures: Blood cultures; NGTD  Procalcitonin elevated at 0.3 ->   Antibiotics:  Zosyn- Unasyn  re: acetinobacter baumanii    Patient remains critically ill.    Additional 45 minutes of critical care time.                 [facility] [place] ADDITIONAL PROGRESS NOTE    Nighttime rounds were performed     Patient is a 43 y/o male with unknown past medical history (reported stroke and MI by coworkers). He presented to [facility] with AMS. Prior to this, pt was working at [employer] when he was found down by coworkers. EMS called and pt brought to [facility] ED. Intubated, sedated, started on cardene for SBPs in 200s. CT head showed pontine bleed. ICH score: 3. Transferred to NSICU for further management.  (30 Sep 2024 12:55)      ICU Vital Signs Last 24 Hrs  T(C): 37 (18 Oct 2024 17:45), Max: 37.5 (17 Oct 2024 21:39)  T(F): 98.6 (18 Oct 2024 17:45), Max: 99.5 (17 Oct 2024 21:39)  HR: 83 (18 Oct 2024 19:00) (82 - 95)  BP: 102/59 (18 Oct 2024 13:00) (102/59 - 105/64)  BP(mean): 76 (18 Oct 2024 13:00) (76 - 76)  ABP: 127/72 (18 Oct 2024 19:00) (99/52 - 134/69)  ABP(mean): 91 (18 Oct 2024 19:00) (67 - 91)  RR: 12 (18 Oct 2024 19:00) (12 - 20)  SpO2: 99% (18 Oct 2024 19:00) (97% - 100%)      10-17-24 @ 07:01  -  10-18-24 @ 07:00  --------------------------------------------------------  IN: 2381 mL / OUT: 3185 mL / NET: -804 mL    10-18-24 @ 07:01  -  10-18-24 @ 20:19  --------------------------------------------------------  IN: 925 mL / OUT: 635 mL / NET: 290 mL      Mode: AC/ CMV (Assist Control/ Continuous Mandatory Ventilation), RR (machine): 12, TV (machine): 430, FiO2: 40, PEEP: 8, ITime: 1.2, MAP: 11, PIP: 17    PHYSICAL EXAM:  Neuro: Eyes open; awake, not tracking, corneals present bilaterally, does not doll to movement of head, +cough, overbreathes  B/L uppers extends, b/l  LE TFs  Not following commands  Pulm: Diminshed  Cards: S1, S2, RRR  GI: Soft, Nontender, Nondistended   Extremities: No LE edema       MEDICATIONS:   acetaminophen   Oral Liquid .. 650 milliGRAM(s) Oral every 6 hours PRN  acetylcysteine 10%  Inhalation 4 milliLiter(s) Inhalation every 12 hours  albuterol/ipratropium for Nebulization 3 milliLiter(s) Nebulizer every 12 hours  amLODIPine   Tablet 10 milliGRAM(s) Oral daily  ampicillin/sulbactam  IVPB 3 Gram(s) IV Intermittent <User Schedule>  bisacodyl Suppository 10 milliGRAM(s) Rectal daily PRN  carbidopa/levodopa  25/100 1 Tablet(s) Oral <User Schedule>  chlorhexidine 2% Cloths 1 Application(s) Topical <User Schedule>  erythromycin   Ointment 1 Application(s) Right EYE every 6 hours  heparin   Injectable 5000 Unit(s) SubCutaneous every 8 hours  hydrALAZINE 50 milliGRAM(s) Oral every 8 hours  labetalol 300 milliGRAM(s) Enteral Tube every 8 hours  oxyCODONE    IR 5 milliGRAM(s) Oral every 4 hours PRN  pantoprazole  Injectable 40 milliGRAM(s) IV Push daily  petrolatum Ophthalmic Ointment 1 Application(s) Both EYES two times a day  polyethylene glycol 3350 17 Gram(s) Oral two times a day  senna 2 Tablet(s) Oral at bedtime  tamsulosin 0.8 milliGRAM(s) Oral at bedtime    LABS:                     9.3    7.85  )-----------( 381      ( 18 Oct 2024 05:28 )             29.6     10-18    136  |  105  |  48[H]  ----------------------------<  124[H]  4.9   |  20[L]  |  3.40[H]    Ca    8.9      18 Oct 2024 05:28  Phos  4.4     10-18  Mg     2.2     10-18        ABG - ( 17 Oct 2024 14:40 )  pH, Arterial: 7.37  pH, Blood: x     /  pCO2: 34    /  pO2: 117   / HCO3: 20    / Base Excess: -4.8  /  SaO2: 98.9        ASSESSMENT/PLAN:  73 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 Severe slowing. No seizures.   Analgesia prn  Palliative/ethics following    PULM:   S/P trach  Full vent support. CPAP as tolerated  Pulm toilet    CV:  SBP goal 100-160  Labetalol, hydralazine, amlodipine     RENAL: CKD (superimposed ANIKA). Hyponatremia resolved.  Na goal 135-145. Monitor BMPs  Vuong DCed; trial of void.  Doxazosin 0.8mg   Nephrology following    GI:  Diet: TFs at goal via NGT  PEG pending  GI prophylaxis [] not indicated [x] PPI [] other:  Bowel regimen [] colace [x] senna [] other: miralax  LBM: 10/18    ENDO:   Goal euglycemia (-180)  A1c: 5.3    HEME/ONC:  VTE prophylaxis: [x] SCDs [x] chemoprophylaxis SQH    ID: Febrile; recurrent  Cultures: Blood cultures; NGTD  Procalcitonin elevated at 0.3 ->   Antibiotics:  Zosyn- Unasyn  re: acetinobacter baumanii    Patient remains critically ill.    Additional 45 minutes of critical care time.

## 2024-10-18 NOTE — PROGRESS NOTE ADULT - SUBJECTIVE AND OBJECTIVE BOX
S/Overnight events:  Pt seen and examined in bed, unable to offer any acute complaints at this time.       Hospital Course:   9/30: transferred from [facility]. A line placed. Versed dc'd. Cy Rader at bedside, states pt has family in [place], cannot confirm medications or PMH other than stroke and MI. 250cc bolus 3% given. LR switched to NS. hydralazine 25q8 started, 3% started, switched propofol to precedex   10/1: stability CTH done. Added labetalol, started TF. Palliative consulted. ethics consulted to determine surrogate. febrile 103, pan cx sent  10/2: BD 2, GEORGE overnight. TF resumed. Desatt'd to 80s, FiO2 inc. to 50. Fentanyl given, ABG, CXR ordered. Maxxed on precedex, started on propofol for DARIEN -4 - -5. Precedex dc'd. Duonebs, mucomyst, hypertonic added. 3% dc'd. Cardene dc'd. Start vanc/CTX. Increased labetalol 200q8. MRSA negative, dc'd vanc. ETT pulled back 2cm x 2, good positioning after confirmatory chest xray. Ethics attempting to establish HCP with family. Na 159, starting FW 250q6 for range 150-155.   10/3: BD3, GEORGE o/n, neuro stable. Na elevating, FW increased to 300q6. Dc'd bowel reg for diarrhea. vEEG started. SQH 5000q8 tonight.   10/4: BD 4, albumn bolus, incr. LR to 80 2/2 incr. in Cr, LR to 100cc/hr for uptrending Cr. Started 7% hypersal for 48hrs and SL atropine for inline/oral thick secretions. Dc'd CTX and started ancef for MSSA in the sputum. Nephrology consulted for CKD, f/u recs. SBP 170s, given hydralazine 10mg IVP.   10/5: BD5, o/n 10mg IVP hydralazine given for SBP 170s and started on hydralazine 25q8 via OGT. 10mg IV push labetalol for SBP > 160s. RT placed for diarrhea.   10/6: BD6, o/n FW increased to 350q4 per nephrology recs. IV tylenol for temp 100.6, SBp 160s presumed uncomfortable.   10/7: BD7, overnight pancultured for temp 101.8F.   10/8: BD8. GEORGE. Cr bumped. decreased LR to 75cc/hr. Adding simethicone ATC. incr hydralazine 50mgTID. Incr labetalol 300mgTID. Na 145, decreased FWF to 250q6. Start precedex. FENa consistent with intrinsic kidney injury. Pend repeat renal US. Retaining up to 1.3L, bladder scans q6, straight cath PRN  10/9: BD 9. GEORGE overnight. Neuro stable. abd xray for distention w non-specific gas pattern, OGT to LIWS for morning. duonebs/mucomyst to q8 for improving secretions. Changed tube feeds to Jevity 1.5 20cc/hr, low rate due to abdominal distention, nepro dense and more difficult to digest. Tolerating CPAP, confirmed by ABG.   10/10: BD 10. GEORGE overnight. Neuro stable. (+) gabriel for urinary retention on bladder scan. inc TF to goal rate of 40cc/hr. family leaning toward pursuing trach/PEG. 1/2 amp for FS 81.   10/11: BD 11. GEORGE overnight. Neuro stable. Trach/PEG consults placed.   10/12: BD 12. GEORGE overnight. Neuro stable. MRI brain complete.   10/13: BD 13. Increase flomax. Hold SQH after PM dose for trach tm. IVL.   10/14: BD 14. GEORGE overnight, remains on AC/VC. Gabriel placed for urinary retention. Dc'd free water.  S/p trach with pulm. NGT placed and CXR confirmed in good position.   10/15: BD 15, GEORGE ovn. resumed feeds. spiked 101, pan cx sent.   10/16: BD 16. GEORGE ovn. Lokelma 5mg for K+ 5.4. Started vanc q 24/zosyn for empiric PNA coverage, IVF to 100/hr. PEG held for fever.   10/17: BD 17,  ordered serum osm and urine osm for am. Started sinemet for neurostimulation. Increased cardura to 0.8. Started FW 100q4, dc'd IVF. MRSA negative, dc'd vanc. NGT replaced d/t coiling.   10/18: BD 18, GEORGE overnight, neuro stable     Vital Signs Last 24 Hrs  T(C): 37.5 (18 Oct 2024 00:54), Max: 37.5 (17 Oct 2024 21:39)  T(F): 99.5 (18 Oct 2024 00:54), Max: 99.5 (17 Oct 2024 21:39)  HR: 87 (18 Oct 2024 01:00) (78 - 95)  BP: --  BP(mean): --  RR: 14 (18 Oct 2024 01:00) (12 - 18)  SpO2: 98% (18 Oct 2024 01:00) (97% - 100%)    Parameters below as of 18 Oct 2024 01:00  Patient On (Oxygen Delivery Method): ventilator    O2 Concentration (%): 40    I&O's Detail    16 Oct 2024 07:01  -  17 Oct 2024 07:00  --------------------------------------------------------  IN:    Enteral Tube Flush: 590 mL    IV PiggyBack: 501 mL    IV PiggyBack: 375 mL    Jevity 1.5: 720 mL    sodium chloride 0.9%: 80 mL    sodium chloride 0.9%: 2300 mL  Total IN: 4566 mL    OUT:    Indwelling Catheter - Urethral (mL): 2210 mL  Total OUT: 2210 mL    Total NET: 2356 mL      17 Oct 2024 07:01  -  18 Oct 2024 01:51  --------------------------------------------------------  IN:    Enteral Tube Flush: 100 mL    Free Water: 300 mL    IV PiggyBack: 526 mL    IV PiggyBack: 100 mL    Jevity 1.5: 440 mL    sodium chloride 0.9%: 400 mL  Total IN: 1866 mL    OUT:    Indwelling Catheter - Urethral (mL): 2650 mL  Total OUT: 2650 mL    Total NET: -784 mL        I&O's Summary    16 Oct 2024 07:01  -  17 Oct 2024 07:00  --------------------------------------------------------  IN: 4566 mL / OUT: 2210 mL / NET: 2356 mL    17 Oct 2024 07:01  -  18 Oct 2024 01:51  --------------------------------------------------------  IN: 1866 mL / OUT: 2650 mL / NET: -784 mL        PHYSICAL EXAM:  Constitutional: Pt found in bed in NAD   ENT: PERRL  Respiratory: +trach, breathing non-labored, symmetrical chest wall movement  Cardiovascuar: RRR, no murmurs  Gastrointestinal: abdomen soft, non tender  Neurological:  AAOX0, Not FC, OE spontaneously, +cough/gag  UE extensor to noxious, LE withdraw from noxious   Pronator Drift: unable to assess  Wounds/Drains: N/A    TUBES/LINES:  [] CVC  [] A-line  [] Lumbar Drain  [] Ventriculostomy  [x] Gabriel  [] Other    DIET:  [] NPO  [] Mechanical  [x] Tube feeds - via NG tube     LABS:                        9.2    9.10  )-----------( 317      ( 17 Oct 2024 05:30 )             29.3     10-17    133[L]  |  104  |  52[H]  ----------------------------<  134[H]  4.8   |  20[L]  |  3.54[H]    Ca    8.4      17 Oct 2024 05:30  Phos  5.3     10-17  Mg     2.2     10-17      PT/INR - ( 16 Oct 2024 05:14 )   PT: 12.9 sec;   INR: 1.12          PTT - ( 16 Oct 2024 05:14 )  PTT:32.0 sec  Urinalysis Basic - ( 17 Oct 2024 05:30 )    Color: x / Appearance: x / SG: x / pH: x  Gluc: 134 mg/dL / Ketone: x  / Bili: x / Urobili: x   Blood: x / Protein: x / Nitrite: x   Leuk Esterase: x / RBC: x / WBC x   Sq Epi: x / Non Sq Epi: x / Bacteria: x          CAPILLARY BLOOD GLUCOSE      POCT Blood Glucose.: 134 mg/dL (17 Oct 2024 11:19)      Drug Levels: [] N/A    CSF Analysis: [] N/A      Allergies    Allergy Status Unknown    Intolerances      MEDICATIONS:  Antibiotics:  piperacillin/tazobactam IVPB.. 3.375 Gram(s) IV Intermittent every 8 hours    Neuro:  acetaminophen   Oral Liquid .. 650 milliGRAM(s) Oral every 6 hours PRN  carbidopa/levodopa  25/100 1 Tablet(s) Oral <User Schedule>  oxyCODONE    IR 5 milliGRAM(s) Oral every 4 hours PRN    Anticoagulation:  heparin   Injectable 5000 Unit(s) SubCutaneous every 8 hours    OTHER:  acetylcysteine 10%  Inhalation 4 milliLiter(s) Inhalation every 12 hours  albuterol/ipratropium for Nebulization 3 milliLiter(s) Nebulizer every 12 hours  amLODIPine   Tablet 10 milliGRAM(s) Oral daily  bisacodyl Suppository 10 milliGRAM(s) Rectal daily PRN  chlorhexidine 2% Cloths 1 Application(s) Topical <User Schedule>  erythromycin   Ointment 1 Application(s) Right EYE every 6 hours  hydrALAZINE 50 milliGRAM(s) Oral every 8 hours  labetalol 300 milliGRAM(s) Enteral Tube every 8 hours  pantoprazole  Injectable 40 milliGRAM(s) IV Push daily  petrolatum Ophthalmic Ointment 1 Application(s) Both EYES two times a day  polyethylene glycol 3350 17 Gram(s) Oral two times a day  senna 2 Tablet(s) Oral at bedtime  tamsulosin 0.8 milliGRAM(s) Oral at bedtime    IVF:  calcium acetate 667 milliGRAM(s) Oral three times a day with meals    CULTURES:  Culture Results:   Mixed gram negative rods  Moderate Acinetobacter baumannii/nosocomialis group (10-16 @ 00:13)  Culture Results:   No growth at 48 Hours (10-15 @ 14:55)    RADIOLOGY & ADDITIONAL TESTS:      ASSESSMENT:  45 y/o PMH ?stroke/MI present to [facility] after collapsing at work. Decorticate posturing, vomiting, intubated for airway protection. 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 Transferred to [facility] for further management. s/p trach 10/14.     Neuro:  - Neuro q4/vitals q1  - pain control: Tylenol prn   - CTH 9/30: enlarged pontine hemorrhage, CTH 10/3 done read stable   - vEEG (10/3-4)- negative, restarted (10/17-)   - stroke core measures, stroke neuro following  - MRI brain w/ w/o contrast 10/12: parenchymal hemorrhage, acute/subacute R cerebellar stroke    - neurostimulation: sinemet 25/100 BID    CV:  - SBP <160  - HTN: amlodipine 10 mg qd, labetalol 300 mg q8h, hydralazine 50 mg q8h   - echo (9/30) EF 75%    Resp:  - trach to vent, CPAP as tolerated  - duonebs/mucomyst q12    GI:  - TF via NGT  -  LBM 10/17  - GI ppx: Protonix while intubated  - Abd distension: Simethicone 80mg q6 dc'ed    Renal:  - IVL, FW 100q4  - Hyperphosphatemia: phoslo TID  - Urinary retenion: Flomax 0.8 mg at bedtime   - Gabriel (10/14- ) iso urinary retention   - CKD: trend Cr   - renal US 10/1: echogenicity c/w chronic med renal dz, repeat done 10/8: inc renal echogeicity, c/f medical renal disease w increased hydro     Endo:  - ISS, A1c 5.4    Heme:  - DVT ppx: SCDs, SQH 5000q8     ID:  - last pan cx 10/15, sputum growing actinobacter baumanii  - Febrile, last pan cx 10/15, tx empiric PNA, zosyn  - MRSA swab negative (10/2), sputum MSSA + , s/p 1 dose vanc (10/2), CTX (10/2 - 10/4), Ancef (10/4-10/14)     Dispo: NSICU, full code, pending PT/OT    D/w Dr. D'Amico

## 2024-10-19 LAB
ANION GAP SERPL CALC-SCNC: 11 MMOL/L — SIGNIFICANT CHANGE UP (ref 5–17)
BUN SERPL-MCNC: 47 MG/DL — HIGH (ref 7–23)
CALCIUM SERPL-MCNC: 8.7 MG/DL — SIGNIFICANT CHANGE UP (ref 8.4–10.5)
CHLORIDE SERPL-SCNC: 104 MMOL/L — SIGNIFICANT CHANGE UP (ref 96–108)
CO2 SERPL-SCNC: 20 MMOL/L — LOW (ref 22–31)
CREAT SERPL-MCNC: 3.36 MG/DL — HIGH (ref 0.5–1.3)
EGFR: 22 ML/MIN/1.73M2 — LOW
EGFR: 22 ML/MIN/1.73M2 — LOW
GLUCOSE BLDC GLUCOMTR-MCNC: 100 MG/DL — HIGH (ref 70–99)
GLUCOSE BLDC GLUCOMTR-MCNC: 101 MG/DL — HIGH (ref 70–99)
GLUCOSE SERPL-MCNC: 114 MG/DL — HIGH (ref 70–99)
HCT VFR BLD CALC: 31 % — LOW (ref 39–50)
HGB BLD-MCNC: 9.5 G/DL — LOW (ref 13–17)
MAGNESIUM SERPL-MCNC: 2.2 MG/DL — SIGNIFICANT CHANGE UP (ref 1.6–2.6)
MCHC RBC-ENTMCNC: 27.8 PG — SIGNIFICANT CHANGE UP (ref 27–34)
MCHC RBC-ENTMCNC: 30.6 GM/DL — LOW (ref 32–36)
MCV RBC AUTO: 90.6 FL — SIGNIFICANT CHANGE UP (ref 80–100)
NRBC # BLD: 0 /100 WBCS — SIGNIFICANT CHANGE UP (ref 0–0)
NRBC BLD-RTO: 0 /100 WBCS — SIGNIFICANT CHANGE UP (ref 0–0)
PHOSPHATE SERPL-MCNC: 4.5 MG/DL — SIGNIFICANT CHANGE UP (ref 2.5–4.5)
PLATELET # BLD AUTO: 460 K/UL — HIGH (ref 150–400)
POTASSIUM SERPL-MCNC: 4.9 MMOL/L — SIGNIFICANT CHANGE UP (ref 3.5–5.3)
POTASSIUM SERPL-SCNC: 4.9 MMOL/L — SIGNIFICANT CHANGE UP (ref 3.5–5.3)
RBC # BLD: 3.42 M/UL — LOW (ref 4.2–5.8)
RBC # FLD: 12.8 % — SIGNIFICANT CHANGE UP (ref 10.3–14.5)
SODIUM SERPL-SCNC: 135 MMOL/L — SIGNIFICANT CHANGE UP (ref 135–145)
WBC # BLD: 6.93 K/UL — SIGNIFICANT CHANGE UP (ref 3.8–10.5)
WBC # FLD AUTO: 6.93 K/UL — SIGNIFICANT CHANGE UP (ref 3.8–10.5)

## 2024-10-19 PROCEDURE — 99291 CRITICAL CARE FIRST HOUR: CPT

## 2024-10-19 PROCEDURE — 93970 EXTREMITY STUDY: CPT | Mod: 26

## 2024-10-19 PROCEDURE — 95720 EEG PHY/QHP EA INCR W/VEEG: CPT

## 2024-10-19 RX ORDER — DOXAZOSIN MESYLATE 8 MG/1
2 TABLET ORAL AT BEDTIME
Refills: 0 | Status: DISCONTINUED | OUTPATIENT
Start: 2024-10-19 | End: 2024-10-21

## 2024-10-19 RX ORDER — LABETALOL HYDROCHLORIDE 200 MG/1
200 TABLET, FILM COATED ORAL EVERY 8 HOURS
Refills: 0 | Status: DISCONTINUED | OUTPATIENT
Start: 2024-10-19 | End: 2024-10-20

## 2024-10-19 RX ADMIN — DOXAZOSIN MESYLATE 2 MILLIGRAM(S): 8 TABLET ORAL at 21:21

## 2024-10-19 RX ADMIN — AMLODIPINE BESYLATE 10 MILLIGRAM(S): 10 TABLET ORAL at 06:37

## 2024-10-19 RX ADMIN — AMPICILLIN SODIUM AND SULBACTAM SODIUM 200 GRAM(S): 1; .5 INJECTION, POWDER, FOR SOLUTION INTRAMUSCULAR; INTRAVENOUS at 21:20

## 2024-10-19 RX ADMIN — Medication 1 APPLICATION(S): at 07:20

## 2024-10-19 RX ADMIN — ERYTHROMYCIN 1 APPLICATION(S): 5 OINTMENT OPHTHALMIC at 07:21

## 2024-10-19 RX ADMIN — ERYTHROMYCIN 1 APPLICATION(S): 5 OINTMENT OPHTHALMIC at 12:15

## 2024-10-19 RX ADMIN — LABETALOL HYDROCHLORIDE 200 MILLIGRAM(S): 200 TABLET, FILM COATED ORAL at 14:41

## 2024-10-19 RX ADMIN — HEPARIN SODIUM 5000 UNIT(S): 1000 INJECTION INTRAVENOUS; SUBCUTANEOUS at 06:36

## 2024-10-19 RX ADMIN — AMPICILLIN SODIUM AND SULBACTAM SODIUM 200 GRAM(S): 1; .5 INJECTION, POWDER, FOR SOLUTION INTRAMUSCULAR; INTRAVENOUS at 01:43

## 2024-10-19 RX ADMIN — ACETYLCYSTEINE 4 MILLILITER(S): 200 INHALANT RESPIRATORY (INHALATION) at 17:57

## 2024-10-19 RX ADMIN — Medication 2 TABLET(S): at 21:21

## 2024-10-19 RX ADMIN — ERYTHROMYCIN 1 APPLICATION(S): 5 OINTMENT OPHTHALMIC at 23:42

## 2024-10-19 RX ADMIN — LABETALOL HYDROCHLORIDE 200 MILLIGRAM(S): 200 TABLET, FILM COATED ORAL at 21:21

## 2024-10-19 RX ADMIN — ERYTHROMYCIN 1 APPLICATION(S): 5 OINTMENT OPHTHALMIC at 00:53

## 2024-10-19 RX ADMIN — Medication 25 MILLIGRAM(S): at 14:40

## 2024-10-19 RX ADMIN — Medication 100 MILLIGRAM(S): at 06:38

## 2024-10-19 RX ADMIN — AMPICILLIN SODIUM AND SULBACTAM SODIUM 200 GRAM(S): 1; .5 INJECTION, POWDER, FOR SOLUTION INTRAMUSCULAR; INTRAVENOUS at 17:40

## 2024-10-19 RX ADMIN — Medication 1 APPLICATION(S): at 06:40

## 2024-10-19 RX ADMIN — LABETALOL HYDROCHLORIDE 300 MILLIGRAM(S): 200 TABLET, FILM COATED ORAL at 06:37

## 2024-10-19 RX ADMIN — IPRATROPIUM BROMIDE AND ALBUTEROL SULFATE 3 MILLILITER(S): .5; 2.5 SOLUTION RESPIRATORY (INHALATION) at 05:53

## 2024-10-19 RX ADMIN — IPRATROPIUM BROMIDE AND ALBUTEROL SULFATE 3 MILLILITER(S): .5; 2.5 SOLUTION RESPIRATORY (INHALATION) at 17:56

## 2024-10-19 RX ADMIN — POLYETHYLENE GLYCOL 3350 17 GRAM(S): 17 POWDER, FOR SOLUTION ORAL at 17:40

## 2024-10-19 RX ADMIN — Medication 40 MILLIGRAM(S): at 12:15

## 2024-10-19 RX ADMIN — AMPICILLIN SODIUM AND SULBACTAM SODIUM 200 GRAM(S): 1; .5 INJECTION, POWDER, FOR SOLUTION INTRAMUSCULAR; INTRAVENOUS at 10:06

## 2024-10-19 RX ADMIN — Medication 1 TABLET(S): at 15:59

## 2024-10-19 RX ADMIN — POLYETHYLENE GLYCOL 3350 17 GRAM(S): 17 POWDER, FOR SOLUTION ORAL at 06:37

## 2024-10-19 RX ADMIN — ACETYLCYSTEINE 4 MILLILITER(S): 200 INHALANT RESPIRATORY (INHALATION) at 05:54

## 2024-10-19 RX ADMIN — AMPICILLIN SODIUM AND SULBACTAM SODIUM 200 GRAM(S): 1; .5 INJECTION, POWDER, FOR SOLUTION INTRAMUSCULAR; INTRAVENOUS at 06:37

## 2024-10-19 RX ADMIN — HEPARIN SODIUM 5000 UNIT(S): 1000 INJECTION INTRAVENOUS; SUBCUTANEOUS at 21:20

## 2024-10-19 RX ADMIN — AMPICILLIN SODIUM AND SULBACTAM SODIUM 200 GRAM(S): 1; .5 INJECTION, POWDER, FOR SOLUTION INTRAMUSCULAR; INTRAVENOUS at 14:41

## 2024-10-19 RX ADMIN — TAMSULOSIN HYDROCHLORIDE 0.8 MILLIGRAM(S): 0.4 CAPSULE ORAL at 21:20

## 2024-10-19 RX ADMIN — HEPARIN SODIUM 5000 UNIT(S): 1000 INJECTION INTRAVENOUS; SUBCUTANEOUS at 12:16

## 2024-10-19 RX ADMIN — Medication 1 TABLET(S): at 06:36

## 2024-10-19 RX ADMIN — ERYTHROMYCIN 1 APPLICATION(S): 5 OINTMENT OPHTHALMIC at 17:40

## 2024-10-19 RX ADMIN — Medication 1 APPLICATION(S): at 17:40

## 2024-10-19 RX ADMIN — Medication 50 MILLIGRAM(S): at 06:37

## 2024-10-19 RX ADMIN — Medication 25 MILLIGRAM(S): at 21:21

## 2024-10-19 NOTE — PROGRESS NOTE ADULT - SUBJECTIVE AND OBJECTIVE BOX
HPI:  Unknown age male, unknown past medical history (reported stroke and MI by coworkers) presented to [facility] with AMS, Pt was working at [employer] when he was found down by coworkers. EMS called and pt brought to [facility] ED. Intubated, sedated, started on cardene for SBPs in 200s. CT head showed brain stem bleed. Transferred to NSICU for further management.  (30 Sep 2024 12:55)    INTERVAL EVENTS: BD 19, GEORGE ovn    Hospital Course:   9/30: transferred from [facility]. A line placed. Versed dc'd. Cy Rader at bedside, states pt has family in [place], cannot confirm medications or PMH other than stroke and MI. 250cc bolus 3% given. LR switched to NS. hydralazine 25q8 started, 3% started, switched propofol to precedex   10/1: stability CTH done. Added labetalol, started TF. Palliative consulted. ethics consulted to determine surrogate. febrile 103, pan cx sent  10/2: BD 2, GEORGE overnight. TF resumed. Desatt'd to 80s, FiO2 inc. to 50. Fentanyl given, ABG, CXR ordered. Maxxed on precedex, started on propofol for DARIEN -4 - -5. Precedex dc'd. Duonebs, mucomyst, hypertonic added. 3% dc'd. Cardene dc'd. Start vanc/CTX. Increased labetalol 200q8. MRSA negative, dc'd vanc. ETT pulled back 2cm x 2, good positioning after confirmatory chest xray. Ethics attempting to establish HCP with family. Na 159, starting FW 250q6 for range 150-155.   10/3: BD3, GEORGE o/n, neuro stable. Na elevating, FW increased to 300q6. Dc'd bowel reg for diarrhea. vEEG started. SQH 5000q8 tonight.   10/4: BD 4, albumn bolus, incr. LR to 80 2/2 incr. in Cr, LR to 100cc/hr for uptrending Cr. Started 7% hypersal for 48hrs and SL atropine for inline/oral thick secretions. Dc'd CTX and started ancef for MSSA in the sputum. Nephrology consulted for CKD, f/u recs. SBP 170s, given hydralazine 10mg IVP.   10/5: BD5, o/n 10mg IVP hydralazine given for SBP 170s and started on hydralazine 25q8 via OGT. 10mg IV push labetalol for SBP > 160s. RT placed for diarrhea.   10/6: BD6, o/n FW increased to 350q4 per nephrology recs. IV tylenol for temp 100.6, SBp 160s presumed uncomfortable.   10/7: BD7, overnight pancultured for temp 101.8F.   10/8: BD8. GEORGE. Cr bumped. decreased LR to 75cc/hr. Adding simethicone ATC. incr hydralazine 50mgTID. Incr labetalol 300mgTID. Na 145, decreased FWF to 250q6. Start precedex. FENa consistent with intrinsic kidney injury. Pend repeat renal US. Retaining up to 1.3L, bladder scans q6, straight cath PRN  10/9: BD 9. GEORGE overnight. Neuro stable. abd xray for distention w non-specific gas pattern, OGT to LIWS for morning. duonebs/mucomyst to q8 for improving secretions. Changed tube feeds to Jevity 1.5 20cc/hr, low rate due to abdominal distention, nepro dense and more difficult to digest. Tolerating CPAP, confirmed by ABG.   10/10: BD 10. GEORGE overnight. Neuro stable. (+) gabriel for urinary retention on bladder scan. inc TF to goal rate of 40cc/hr. family leaning toward pursuing trach/PEG. 1/2 amp for FS 81.   10/11: BD 11. GEORGE overnight. Neuro stable. Trach/PEG consults placed.   10/12: BD 12. GEORGE overnight. Neuro stable. MRI brain complete.   10/13: BD 13. Increase flomax. Hold SQH after PM dose for trach tm. IVL.   10/14: BD 14. GEORGE overnight, remains on AC/VC. Gabriel placed for urinary retention. Dc'd free water.  S/p trach with pulm. NGT placed and CXR confirmed in good position.   10/15: BD 15, GEORGE ovn. resumed feeds. spiked 101, pan cx sent.   10/16: BD 16. GEORGE ovn. Lokelma 5mg for K+ 5.4. Started vanc q 24/zosyn for empiric PNA coverage, IVF to 100/hr. PEG held for fever.   10/17: BD 17,  ordered serum osm and urine osm for am. Started sinemet for neurostimulation. Increased cardura to 0.8. Started FW 100q4, dc'd IVF. MRSA negative, dc'd vanc. NGT replaced d/t coiling.   10/18: BD 18, GEORGE overnight, neuro stable. Amantadine added for neurostim. zosyn changed to unasyn for acinetobacter baumannii   10/19: BD 19, GEORGE ovn      Vital Signs Last 24 Hrs  T(C): 37 (18 Oct 2024 17:45), Max: 37.5 (17 Oct 2024 21:39)  T(F): 98.6 (18 Oct 2024 17:45), Max: 99.5 (17 Oct 2024 21:39)  HR: 83 (18 Oct 2024 19:00) (82 - 95)  BP: 102/59 (18 Oct 2024 13:00) (102/59 - 105/64)  BP(mean): 76 (18 Oct 2024 13:00) (76 - 76)  RR: 12 (18 Oct 2024 19:00) (12 - 20)  SpO2: 99% (18 Oct 2024 19:00) (97% - 100%)    Parameters below as of 18 Oct 2024 18:00  Patient On (Oxygen Delivery Method): ventilator    O2 Concentration (%): 40    I&O's Detail    17 Oct 2024 07:01  -  18 Oct 2024 07:00  --------------------------------------------------------  IN:    Enteral Tube Flush: 160 mL    Free Water: 500 mL    IV PiggyBack: 100 mL    IV PiggyBack: 701 mL    Jevity 1.5: 520 mL    sodium chloride 0.9%: 400 mL  Total IN: 2381 mL    OUT:    Indwelling Catheter - Urethral (mL): 3185 mL  Total OUT: 3185 mL    Total NET: -804 mL      18 Oct 2024 07:01  -  18 Oct 2024 19:59  --------------------------------------------------------  IN:    Enteral Tube Flush: 180 mL    Free Water: 300 mL    IV PiggyBack: 25 mL    IV PiggyBack: 100 mL    Jevity 1.5: 320 mL  Total IN: 925 mL    OUT:    Indwelling Catheter - Urethral (mL): 635 mL  Total OUT: 635 mL    Total NET: 290 mL        I&O's Summary    17 Oct 2024 07:01  -  18 Oct 2024 07:00  --------------------------------------------------------  IN: 2381 mL / OUT: 3185 mL / NET: -804 mL    18 Oct 2024 07:01  -  18 Oct 2024 19:59  --------------------------------------------------------  IN: 925 mL / OUT: 635 mL / NET: 290 mL        PHYSICAL EXAM:  Constitutional: Pt found in bed in NAD   ENT: PERRL  Respiratory: +trach, breathing non-labored, symmetrical chest wall movement  Cardiovascuar: RRR, no murmurs  Gastrointestinal: abdomen soft, non tender  Neurological:  AAOX0, Not FC, OE spontaneously, +cough/gag  LUE withdraws to noxious, RUE extensor to noxious, LLE withdraw from noxious, RLE TF   Pronator Drift: unable to assess  Wounds/Drains: N/A    TUBES/LINES:  [] CVC  [] A-line  [] Lumbar Drain  [] Ventriculostomy  [x] Gabriel  [] Other    DIET:  [] NPO  [] Mechanical  [x] Tube feeds - via NG tube     LABS:                        9.3    7.85  )-----------( 381      ( 18 Oct 2024 05:28 )             29.6     10-18    136  |  105  |  48[H]  ----------------------------<  124[H]  4.9   |  20[L]  |  3.40[H]    Ca    8.9      18 Oct 2024 05:28  Phos  4.4     10-18  Mg     2.2     10-18        Urinalysis Basic - ( 18 Oct 2024 05:28 )    Color: x / Appearance: x / SG: x / pH: x  Gluc: 124 mg/dL / Ketone: x  / Bili: x / Urobili: x   Blood: x / Protein: x / Nitrite: x   Leuk Esterase: x / RBC: x / WBC x   Sq Epi: x / Non Sq Epi: x / Bacteria: x          CAPILLARY BLOOD GLUCOSE      POCT Blood Glucose.: 98 mg/dL (18 Oct 2024 11:43)      Drug Levels: [] N/A    CSF Analysis: [] N/A      Allergies    Allergy Status Unknown    Intolerances        Home Medications:      MEDICATIONS:  Antibiotics:  ampicillin/sulbactam  IVPB 3 Gram(s) IV Intermittent <User Schedule>    Neuro:  acetaminophen   Oral Liquid .. 650 milliGRAM(s) Oral every 6 hours PRN  carbidopa/levodopa  25/100 1 Tablet(s) Oral <User Schedule>  oxyCODONE    IR 5 milliGRAM(s) Oral every 4 hours PRN    Anticoagulation:  heparin   Injectable 5000 Unit(s) SubCutaneous every 8 hours    OTHER:  acetylcysteine 10%  Inhalation 4 milliLiter(s) Inhalation every 12 hours  albuterol/ipratropium for Nebulization 3 milliLiter(s) Nebulizer every 12 hours  amLODIPine   Tablet 10 milliGRAM(s) Oral daily  bisacodyl Suppository 10 milliGRAM(s) Rectal daily PRN  chlorhexidine 2% Cloths 1 Application(s) Topical <User Schedule>  erythromycin   Ointment 1 Application(s) Right EYE every 6 hours  hydrALAZINE 50 milliGRAM(s) Oral every 8 hours  labetalol 300 milliGRAM(s) Enteral Tube every 8 hours  pantoprazole  Injectable 40 milliGRAM(s) IV Push daily  petrolatum Ophthalmic Ointment 1 Application(s) Both EYES two times a day  polyethylene glycol 3350 17 Gram(s) Oral two times a day  senna 2 Tablet(s) Oral at bedtime  tamsulosin 0.8 milliGRAM(s) Oral at bedtime    IVF:    CULTURES:  Culture Results:   Mixed gram negative rods including  Moderate Acinetobacter baumannii/nosocomialis group (10-16 @ 00:13)  Culture Results:   No growth at 48 Hours (10-15 @ 14:55)    RADIOLOGY & ADDITIONAL TESTS:      ASSESSMENT:  45 y/o PMH ?stroke/MI present to [facility] after collapsing at work. Decorticate posturing, vomiting, intubated for airway protection. 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 Transferred to [facility] for further management. s/p trach 10/14.     Neuro:  - Neuro q4/vitals q1  - pain control: Tylenol prn   - CTH 9/30: enlarged pontine hemorrhage, CTH 10/3 done read stable   - vEEG (10/3-4)- negative, restarted (10/17-)   - stroke core measures, stroke neuro following  - MRI brain w/ w/o contrast 10/12: parenchymal hemorrhage, acute/subacute R cerebellar stroke    - neurostimulation: amantadine, sinemet 25/100 BID    CV:  - SBP <160  - HTN: amlodipine 10 mg qd, labetalol 300 mg q8h, hydralazine 50 mg q8h   - echo (9/30) EF 75%    Resp:  - trach to vent, CPAP as tolerated  - duonebs/mucomyst q12    GI:  - TF via NGT  -  LBM 10/17  - GI ppx: Protonix while intubated  - Abd distension: Simethicone 80mg q6 dc'ed    Renal:  - IVL, FW 100q4  - Urinary retenion: Flomax 0.8 mg at bedtime   - Gabriel (10/14- ) iso urinary retention - TOV today 10/18  - CKD: trend Cr   - renal US 10/1: echogenicity c/w chronic med renal dz, repeat 10/8: inc renal echogeicity, c/f medical renal disease w increased hydro     Endo:  - ISS, A1c 5.4    Heme:  - DVT ppx: SCDs, SQH 5000q8     ID:  - last pan cx 10/15  - 10/15 sputum +actinobacter baumanii, on unasyn (10/18- )  - MSSA swab positive (10/17)  - MRSA swab negative (10/2), sputum MSSA + , s/p 1 dose vanc (10/2), CTX (10/2 - 10/4), Ancef (10/4-10/14)     Dispo: NSICU, full code, pending PT/OT    D/w Dr. D'Amico    Assessment:  Present when checked    []  GCS  E   V  M     Heart Failure: []Acute, [] acute on chronic , []chronic  Heart Failure:  [] Diastolic (HFpEF), [] Systolic (HFrEF), []Combined (HFpEF and HFrEF), [] RHF, [] Pulm HTN, [] Other    [] ANIKA, [] ATN, [] AIN, [] other  [] CKD1, [] CKD2, [] CKD 3, [] CKD 4, [] CKD 5, []ESRD    Encephalopathy: [] Metabolic, [] Hepatic, [] toxic, [] Neurological, [] Other    Abnormal Nurtitional Status: [] malnurtition (see nutrition note), [ ]underweight: BMI < 19, [] morbid obesity: BMI >40, [] Cachexia    [] Sepsis  [] hypovolemic shock,[] cardiogenic shock, [] hemorrhagic shock, [] neuogenic shock  [] Acute Respiratory Failure  []Cerebral edema, [] Brain compression/ herniation,   [] Functional quadriplegia  [] Acute blood loss anemia

## 2024-10-19 NOTE — EEG REPORT - NS EEG TEXT BOX
[facility] Department of Neurology  Inpatient Continuous video-Electroencephalogram    Patient Name:	GARETT DELEON    :	1980  MRN:	[id number]    Study Start Date/Time:  10/17/2024, 05:05:07 PM  Study End Date/Time:    Referred by: Dr D’Amico    Brief clinical history:  44 y old man with pontine hemorrhage.  Prolonged video EEG obtained to rule out subclinical seizures.      Diagnosis code:   R56.9 convulsions/seizure    Current anticonvulsants:  None      Acquisition details:  Electroencephalography was acquired using a minimum of 21 channels on an [employer] system v 9.3.1 with electrode placement according to the standard International 10-20 system following ACNS (American Clinical Neurophysiology Society) guidelines for Long-Term Video EEG monitoring.  Anterior temporal T1 and T2 electrodes were utilized whenever possible.   The [employer] automated spike & seizure detections were all reviewed in detail, in addition to extensive portions of raw EEG.      Day 1  From 10/17/2024 at 05:05:07 PM to 10/18/2024 at 07:00:00 AM  Background:  continuous, with predominantly delta frequencies, at time with overriding alpha and beta rhythms.  Generalized Slowing: Severe  Symmetry:  No persistent asymmetries of voltage or frequency.  Posterior Dominant Rhythm:  A sustained posterior dominant rhythm was not present.  Organization: Fairly well organized.  Voltage:  Normal (20+ uV)  Variability: Yes. 		Reactivity: Yes.  N2 sleep: Symmetric, synchronous spindles and K complexes.  Spontaneous Activity:  No epileptiform discharges.  Periodic/rhythmic activity:  None  Events:  No electrographic seizures or significant clinical events.  Provocations:  Hyperventilation and Photic stimulation were not performed.    Daily Summary:    Severe generalized background slowing, with variability and rare episodes of reactivity.  These findings are consistent with the presence of severe diffuse cerebral dysfunction.  No epileptiform discharges.  No electrographic or electroclinical seizures occurred.    Kandis Ball MD  Attending Neurologist, [facility]        Day 2  From 10/18/2024 at 07:00:00 AM to 10/19/2024 at 07:00:00 AM  Background: mixture of continuous, low voltage delta frequencies, with rare intermixed theta rhythms.  Generalized Slowing: Severe  Symmetry:  No persistent asymmetries of voltage or frequency.  Posterior Dominant Rhythm:  A sustained posterior dominant rhythm was not present.  Organization: Fairly well organized.  Voltage:  Low voltage.  Variability: Yes. 		Reactivity: Yes.  N2 sleep: Rare symmetric and synchronous sleep spindles were present. Very rare K complexes were present..  Spontaneous Activity:  No epileptiform discharges.  Periodic/rhythmic activity:  None  Events:  No electrographic seizures or significant clinical events.  Provocations:  Hyperventilation and Photic stimulation were not performed.    Daily Summary:    Severe generalized background slowing, with variability and rare episodes of reactivity.  These findings are consistent with the presence of severe diffuse cerebral dysfunction.  No epileptiform discharges.  No electrographic or electroclinical seizures occurred.    Maite Valencia MD  Attending Neurologist, [facility]

## 2024-10-19 NOTE — PROGRESS NOTE ADULT - SUBJECTIVE AND OBJECTIVE BOX
Neurocritical Care Attending Note     HPI: Mr. Reyes is a 45 y/o gentleman, originally from [place], with unknown past medical history (reported stroke and MI by coworkers). He presented to [facility] with AMS. Prior to this, he was working at [employer] when he was found down by coworkers. EMS called and pt brought to [facility] ED. Intubated, sedated, started on cardene for hypertensive emergency SBPs in 200s. CT head showed pontine bleed. Transferred to NSICU for further management.  (30 Sep 2024 12:55).    Hospital Course/Interval Events  9/30: transferred from [facility]. A line placed. Versed dc'd. Cy Rader at bedside, states pt has family in [place], cannot confirm medications or PMH other than stroke and MI. 250cc bolus 3% given. LR switched to NS. hydralazine 25q8 started, 3% started, switched propofol to precedex   10/1: stability CTH done. Added labetalol, started TF. Palliative consulted. ethics consulted to determine surrogate. febrile 103, pan cx sent  10/2: BD 2, GEORGE overnight. TF resumed. Desatt'd to 80s, FiO2 inc. to 50. Fentanyl given, ABG, CXR ordered. Maxxed on precedex, started on propofol for DARIEN -4 - -5. Precedex dc'd. Duonebs, mucomyst, hypertonic added. 3% dc'd. Cardene dc'd. Start vanc/CTX. Increased labetalol 200q8. MRSA negative, dc'd vanc. ETT pulled back 2cm x 2, good positioning after confirmatory chest xray. Ethics attempting to establish HCP with family. Na 159, starting FW 250q6 for range 150-155.   10/3: BD3, GEORGE o/n, neuro stable. Na elevating, FW increased to 300q6. Dc'd bowel reg for diarrhea. vEEG started. SQH 5000q8 tonight.   10/4: BD 4, albumn bolus, incr. LR to 80 2/2 incr. in Cr, LR to 100cc/hr for uptrending Cr. Started 7% hypersal for 48hrs and SL atropine for inline/oral thick secretions. Dc'd CTX and started ancef for MSSA in the sputum. Nephrology consulted for CKD, f/u recs. SBP 170s, given hydralazine 10mg IVP.   10/5: BD5, o/n 10mg IVP hydralazine given for SBP 170s and started on hydralazine 25q8 via OGT. 10mg IV push labetalol for SBP > 160s. RT placed for diarrhea.   10/6: BD6, o/n FW increased to 350q4 per nephrology recs. IV tylenol for temp 100.6, SBp 160s presumed uncomfortable.   10/7: BD7, overnight pancultured for temp 101.8F.   10/8: BD8. GEORGE. Cr bumped. decreased LR to 75cc/hr. Adding simethicone ATC. incr hydralazine 50mgTID. Incr labetalol 300mgTID. Na 145, decreased FWF to 250q6. Start precedex. FENa consistent with intrinsic kidney injury. Pend repeat renal US. Retaining up to 1.3L, bladder scans q6, straight cath PRN  10/9: BD 9. GEORGE overnight. Neuro stable. abd xray for distention w non-specific gas pattern, OGT to LIWS for morning. duonebs/mucomyst to q8 for improving secretions. Changed tube feeds to Jevity 1.5 20cc/hr, low rate due to abdominal distention, nepro dense and more difficult to digest. Tolerating CPAP, confirmed by ABG.   10/10: BD 10. GEORGE overnight. Neuro stable. (+) gabriel for urinary retention on bladder scan. inc TF to goal rate of 40cc/hr. family leaning toward pursuing trach/PEG. 1/2 amp for FS 81.   10/11: BD 11. GEORGE overnight. Neuro stable. Trach/PEG consults placed.   10/12: BD 12. GEORGE overnight. Neuro stable. MRI brain complete.   10/13: BD 13. Increase flomax. Hold SQH after PM dose for trach tm. IVL.   10/14: BD 14. GEORGE overnight, remains on AC/VC. Gabriel placed for urinary retention. Dc'd free water.  S/p trach with pulm. NGT placed and CXR confirmed in good position.   10/15: BD 15, GEORGE ovn. resumed feeds. spiked 101, pan cx sent.   10/16: BD 16. GEORGE ovn. Lokelma 5mg for K+ 5.4. Started vanc q 24/zosyn for empiric PNA coverage, IVF to 100/hr. PEG held for fever.   10/17: Na low, ordered serum osm and urine osm for am. Started sinemet for neurostimulation. Increased cardura to 0.8. Started FW 100q4, dc'd IVF.   10/18: BD 18, GEORGE overnight, neuro stable. Amantadine added for neurostim.     MEDICATIONS  (STANDING):  acetylcysteine 10%  Inhalation 4 milliLiter(s) Inhalation every 12 hours  albuterol/ipratropium for Nebulization 3 milliLiter(s) Nebulizer every 12 hours  amantadine 100 milliGRAM(s) Oral <User Schedule>  amLODIPine   Tablet 10 milliGRAM(s) Oral daily  ampicillin/sulbactam  IVPB 3 Gram(s) IV Intermittent <User Schedule>  carbidopa/levodopa  25/100 1 Tablet(s) Oral <User Schedule>  chlorhexidine 2% Cloths 1 Application(s) Topical <User Schedule>  erythromycin   Ointment 1 Application(s) Right EYE every 6 hours  heparin   Injectable 5000 Unit(s) SubCutaneous every 8 hours  hydrALAZINE 50 milliGRAM(s) Oral every 8 hours  labetalol 300 milliGRAM(s) Enteral Tube every 8 hours  pantoprazole  Injectable 40 milliGRAM(s) IV Push daily  petrolatum Ophthalmic Ointment 1 Application(s) Both EYES two times a day  polyethylene glycol 3350 17 Gram(s) Oral two times a day  senna 2 Tablet(s) Oral at bedtime  tamsulosin 0.8 milliGRAM(s) Oral at bedtime    MEDICATIONS  (PRN):  acetaminophen   Oral Liquid .. 650 milliGRAM(s) Oral every 6 hours PRN Temp greater or equal to 38C (100.4F)  bisacodyl Suppository 10 milliGRAM(s) Rectal daily PRN Constipation  oxyCODONE    IR 5 milliGRAM(s) Oral every 4 hours PRN Mild Pain (1 - 3)      ICU Vital Signs Last 24 Hrs  T(C): 37.3 (19 Oct 2024 05:19), Max: 37.3 (19 Oct 2024 05:19)  T(F): 99.1 (19 Oct 2024 05:19), Max: 99.1 (19 Oct 2024 05:19)  HR: 81 (19 Oct 2024 05:45) (81 - 87)  BP: 102/59 (18 Oct 2024 13:00) (102/59 - 105/64)  BP(mean): 76 (18 Oct 2024 13:00) (76 - 76)  ABP: 107/58 (19 Oct 2024 05:00) (99/52 - 136/72)  ABP(mean): 74 (19 Oct 2024 05:00) (67 - 94)  RR: 17 (19 Oct 2024 05:45) (12 - 19)  SpO2: 96% (19 Oct 2024 05:45) (93% - 100%)    O2 Parameters below as of 19 Oct 2024 05:45  Patient On (Oxygen Delivery Method): ventilator    O2 Concentration (%): 40    VENT ORDERS:   Mechanical Vent - Volume Ctrl Settings: Routine  Ventilator Mode: AC/CMV  Targeted SpO2 Range (%): 88-97  Tidal Volume:  430   Respiratory Rate:  12   FiO2:  40  PEEP:  8     T(C): 37.3 (10-19-24 @ 05:19), Max: 37.3 (10-19-24 @ 05:19)  HR: 81 (10-19-24 @ 05:45) (81 - 87)  BP: 102/59 (10-18-24 @ 13:00) (102/59 - 105/64)  RR: 17 (10-19-24 @ 05:45) (12 - 19)  SpO2: 96% (10-19-24 @ 05:45) (93% - 100%)    CONSTITUTIONAL: no apparent distress  EYES: PERRLA and symmetric, EOMI, No conjunctival or scleral injection, non-icteric  ENMT: Oral mucosa with moist membranes. NECK: Supple, tracheostomy in place   RESP: No respiratory distress, CTA b/l, no WRR  CV: RRR, +S1S2  GI: Soft, NT, ND  NEURO:   Mental Status: awake/eyes open, maintaining eyes opening spontaneously intermittent downward gaze, not convincingly following commands (attempted to ask to look downward or wiggle fingers in Yoruba)  CNs: Disconjugated gaze, left eye some abduction and adduction movements noted, spontaneous vs. purposeful downward intermittent eye movements, pupils equally round and reactive to light, corneal + L >> R,  cough ++, breathing over vent  Motor: head/neck movement to noxious, extensor posturing to noxious at the bilateral upper extremities, triple flexion at the bilateral lower extremities       ASSESSMENT/PLAN  45 y/o PMH ?stroke/MI present to [facility] after collapsing at work. Decorticate posturing, vomiting, intubated for airway protection. 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 Transferred to [facility] for further management. s/p trach 10/14. Course complicated by persistent encephalopathy/wakeful unresponsiveness, respiratory failure, VAP.       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   Data   - CTA head and neck 09/30: Re-demonstration of pontine hemorrhage with associated edema and mass effect upon the fourth ventricle and cerebral aqueduct. No underlying no malformation is identified. No increase in ventricular size since the   prior study.  - CT head 10/03: Again noted is a parenchymal hematoma in the nabil, with associated  expansion and surrounding vasogenic edema. There is mild spillage into the subarachnoid spaces at the cistern and within sulci in the right frontal lobe. All of these findings appear stable, without interval rebleeding. There is a chronic hemorrhagic infarct in the left external capsule/corona radiata. There is a small chronic white matter infarct vs small vessel disease in the right corona radiata. Moderate generalized cerebral volume loss, with distention of the sulci and concomitant ex-vacuo ventricular dilatation. Mild nonspecific low attenuation in the periventricular and subcortical white matter. No midline shift or herniation. No CT evidence of acute territorial infarction, although MRI with DWI would be more sensitive. Limited views of the sinuses and mastoids show mild mucosal thickening without air-fluid levels, likely chronic. An ETT is noted in place. Limited views of the orbits and visualized soft tissues of the neck, face, scalp, skull base, and calvarium are otherwise unremarkable. IMPRESSION: No significant change, without interval rebleeding.  - MRI brain 10/12: Similar-appearing evolving acute parenchymal hemorrhage within the nabil with surrounding mass effect and edema. No underlying enhancement. No new areas of acute intracranial hemorrhage. Wedge-shaped acute/subacute infarct within the right lateral cerebellar hemisphere. Unchanged encephalomalacia and gliosis within the left external capsule related to remote hemorrhage in this location.  - vEEG 10/17: with predominant delta slowing with superimposed geo and beta/some reactivity present     Plan  BP goal 100-150, MAP > 65  Sinemet and Amantadine to promote awakening   CT head for any acute neurological change   Sedation: None  PRN fentanyl for vent synchrony   Plan for long term rehab facility     CV   Diagnosis: Uncontrolled hypertension   Data  - ECHO 9/30 hyperdynamic left ventricular function EF 70%   Plan  SBP goal 100-150, MAP > 65  Labetalol, hydralazine, amlodipine (hold parameters)  Telemetry monitoring     PULM   Diagnosis:  Intubated for airway protection in the setting of acute encephalopathy; persistent respiratory failure; S/P tracheostomy; VAP   Data  Chest x-ray 10/17: Similar to prior exam 10/16/2024 with right lung base partial atelectasis. Nasogastric tube tip below hemidiaphragm. No pneumothorax. No acute infiltrate appearing. Possible minimal right pleural effusion. No left pleural effusion.  ABG - ( 17 Oct 2024 14:40 )  pH, Arterial: 7.37  pH, Blood: x     /  pCO2: 34    /  pO2: 117   / HCO3: 20    / Base Excess: -4.8  /  SaO2: 98.9    Plan   Currently on full vent support ---> PS as tolerated  Pulm toilet/chest PT/Nebs  pulse-oxymetry monitoring   Antibiotics for VAP    RENAL   Diagnosis: ANIKA on CKD unknown baseline) - - > now improving   Data  BUN/Cr 47/3/36 <-- 48/3.40   i/o    10-18-24 @ 07:01  -  10-19-24 @ 07:00  --------------------------------------------------------  IN: 1925 mL / OUT: 1535 mL / NET: 390 mL    135  |  104  |  47[H]  ----------------------------<  114[H]  4.9   |  20[L]  |  3.36[H]    Ca    8.7      19 Oct 2024 05:30  Phos  4.5     10-19  Mg     2.2     10-19    Plan   Euvolemia/normonatremia goal  KVO  FWF 100q4  Monitor for hyperkalemia/monitor I/O  Trial of void/monitor for retention   Doxazosin 0.8mg   Renal following   Avoid nephrotoxic medications    ID  Diagnosis: Febrile/hypoxia 10/15 c/w Ventilator Associated PNA ++Acitonobacter B. ++MSSA   Data  Blood Cx 10/15: NGTD   Sputum Cx 10/16: Acinetobacter B.   Procalcitonin 10/16: 0.36 <-- 0.13   Plan   - S/P Ancef --> restarted on broad spectrum antibiotics on 10/16 Vancomycin and Zosyin --> given sensitivity narrow to Unasyn (renally dosed) continue for 7 day course for VAP  Follow-up cultures results  monitor WBC and fever curve   culture if spikes 38.3C    GI  Diagnosis: Dysphagia secondary to encephalopathy/hemorrhagic stroke, constipation   Data  LBM: 10/12  Plan   NGT ----> PEG planning (10/23 with surgery)  Diet: enteral feeding at goal  Free water flashes 100 q4 (KVO)  GI prophylaxis while intubated   Bowel regimen ---> add suppository    HEME/ONC  Diagnosis: no active issues  Data  CBC Full  -  ( 19 Oct 2024 05:30 )  WBC Count : 6.93 K/uL  RBC Count : 3.42 M/uL  Hemoglobin : 9.5 g/dL  Hematocrit : 31.0 %  Platelet Count - Automated : 460 K/uL  Mean Cell Volume : 90.6 fl  Mean Cell Hemoglobin : 27.8 pg  Mean Cell Hemoglobin Concentration : 30.6 gm/dL    Plan   Monitor H&H  Hbg goal > 7.0, PLT > 100  SDCs + heparin SQ for DVT prophylaxis    ENDO  Diagnosis: no active issues   Data  A1c: 5.3  PLAN:   Goal euglycemia (-180)      CODE STATUS: FULL CODE  Patient's son (Chauncey Reyes) updated at bedside with . He understands the current clinical condition of his father and would like for him to continue receiving full life sustaining measures.     DISPO: NeuroICU      Jazlyn Tomlin MD   Neurocritical Care Attending                Neurocritical Care Attending Note     HPI: Mr. Reyes is a 43 y/o gentleman, originally from [place], with unknown past medical history (reported stroke and MI by coworkers). He presented to [facility] with AMS. Prior to this, he was working at [employer] when he was found down by coworkers. EMS called and pt brought to [facility] ED. Intubated, sedated, started on cardene for hypertensive emergency SBPs in 200s. CT head showed pontine bleed. Transferred to NSICU for further management.  (30 Sep 2024 12:55).    Hospital Course/Interval Events  9/30: transferred from [facility]. A line placed. Versed dc'd. Cy Rader at bedside, states pt has family in [place], cannot confirm medications or PMH other than stroke and MI. 250cc bolus 3% given. LR switched to NS. hydralazine 25q8 started, 3% started, switched propofol to precedex   10/1: stability CTH done. Added labetalol, started TF. Palliative consulted. ethics consulted to determine surrogate. febrile 103, pan cx sent  10/2: BD 2, GEORGE overnight. TF resumed. Desatt'd to 80s, FiO2 inc. to 50. Fentanyl given, ABG, CXR ordered. Maxxed on precedex, started on propofol for DARIEN -4 - -5. Precedex dc'd. Duonebs, mucomyst, hypertonic added. 3% dc'd. Cardene dc'd. Start vanc/CTX. Increased labetalol 200q8. MRSA negative, dc'd vanc. ETT pulled back 2cm x 2, good positioning after confirmatory chest xray. Ethics attempting to establish HCP with family. Na 159, starting FW 250q6 for range 150-155.   10/3: BD3, GEORGE o/n, neuro stable. Na elevating, FW increased to 300q6. Dc'd bowel reg for diarrhea. vEEG started. SQH 5000q8 tonight.   10/4: BD 4, albumn bolus, incr. LR to 80 2/2 incr. in Cr, LR to 100cc/hr for uptrending Cr. Started 7% hypersal for 48hrs and SL atropine for inline/oral thick secretions. Dc'd CTX and started ancef for MSSA in the sputum. Nephrology consulted for CKD, f/u recs. SBP 170s, given hydralazine 10mg IVP.   10/5: BD5, o/n 10mg IVP hydralazine given for SBP 170s and started on hydralazine 25q8 via OGT. 10mg IV push labetalol for SBP > 160s. RT placed for diarrhea.   10/6: BD6, o/n FW increased to 350q4 per nephrology recs. IV tylenol for temp 100.6, SBp 160s presumed uncomfortable.   10/7: BD7, overnight pancultured for temp 101.8F.   10/8: BD8. GEORGE. Cr bumped. decreased LR to 75cc/hr. Adding simethicone ATC. incr hydralazine 50mgTID. Incr labetalol 300mgTID. Na 145, decreased FWF to 250q6. Start precedex. FENa consistent with intrinsic kidney injury. Pend repeat renal US. Retaining up to 1.3L, bladder scans q6, straight cath PRN  10/9: BD 9. GEORGE overnight. Neuro stable. abd xray for distention w non-specific gas pattern, OGT to LIWS for morning. duonebs/mucomyst to q8 for improving secretions. Changed tube feeds to Jevity 1.5 20cc/hr, low rate due to abdominal distention, nepro dense and more difficult to digest. Tolerating CPAP, confirmed by ABG.   10/10: BD 10. GEORGE overnight. Neuro stable. (+) gabriel for urinary retention on bladder scan. inc TF to goal rate of 40cc/hr. family leaning toward pursuing trach/PEG. 1/2 amp for FS 81.   10/11: BD 11. GEORGE overnight. Neuro stable. Trach/PEG consults placed.   10/12: BD 12. GOERGE overnight. Neuro stable. MRI brain complete.   10/13: BD 13. Increase flomax. Hold SQH after PM dose for trach tm. IVL.   10/14: BD 14. GEORGE overnight, remains on AC/VC. Gabriel placed for urinary retention. Dc'd free water.  S/p trach with pulm. NGT placed and CXR confirmed in good position.   10/15: BD 15, GEORGE ovn. resumed feeds. spiked 101, pan cx sent.   10/16: BD 16. GEORGE ovn. Lokelma 5mg for K+ 5.4. Started vanc q 24/zosyn for empiric PNA coverage, IVF to 100/hr. PEG held for fever.   10/17: Na low, ordered serum osm and urine osm for am. Started sinemet for neurostimulation. Increased cardura to 0.8. Started FW 100q4, dc'd IVF.   10/18: BD 18, GEORGE overnight, neuro stable. Amantadine added for neurostim.     MEDICATIONS  (STANDING):  acetylcysteine 10%  Inhalation 4 milliLiter(s) Inhalation every 12 hours  albuterol/ipratropium for Nebulization 3 milliLiter(s) Nebulizer every 12 hours  amantadine 100 milliGRAM(s) Oral <User Schedule>  amLODIPine   Tablet 10 milliGRAM(s) Oral daily  ampicillin/sulbactam  IVPB 3 Gram(s) IV Intermittent <User Schedule>  carbidopa/levodopa  25/100 1 Tablet(s) Oral <User Schedule>  chlorhexidine 2% Cloths 1 Application(s) Topical <User Schedule>  erythromycin   Ointment 1 Application(s) Right EYE every 6 hours  heparin   Injectable 5000 Unit(s) SubCutaneous every 8 hours  hydrALAZINE 50 milliGRAM(s) Oral every 8 hours  labetalol 300 milliGRAM(s) Enteral Tube every 8 hours  pantoprazole  Injectable 40 milliGRAM(s) IV Push daily  petrolatum Ophthalmic Ointment 1 Application(s) Both EYES two times a day  polyethylene glycol 3350 17 Gram(s) Oral two times a day  senna 2 Tablet(s) Oral at bedtime  tamsulosin 0.8 milliGRAM(s) Oral at bedtime    MEDICATIONS  (PRN):  acetaminophen   Oral Liquid .. 650 milliGRAM(s) Oral every 6 hours PRN Temp greater or equal to 38C (100.4F)  bisacodyl Suppository 10 milliGRAM(s) Rectal daily PRN Constipation  oxyCODONE    IR 5 milliGRAM(s) Oral every 4 hours PRN Mild Pain (1 - 3)      ICU Vital Signs Last 24 Hrs  T(C): 37.3 (19 Oct 2024 05:19), Max: 37.3 (19 Oct 2024 05:19)  T(F): 99.1 (19 Oct 2024 05:19), Max: 99.1 (19 Oct 2024 05:19)  HR: 81 (19 Oct 2024 05:45) (81 - 87)  BP: 102/59 (18 Oct 2024 13:00) (102/59 - 105/64)  BP(mean): 76 (18 Oct 2024 13:00) (76 - 76)  ABP: 107/58 (19 Oct 2024 05:00) (99/52 - 136/72)  ABP(mean): 74 (19 Oct 2024 05:00) (67 - 94)  RR: 17 (19 Oct 2024 05:45) (12 - 19)  SpO2: 96% (19 Oct 2024 05:45) (93% - 100%)    O2 Parameters below as of 19 Oct 2024 05:45  Patient On (Oxygen Delivery Method): ventilator    O2 Concentration (%): 40    VENT ORDERS:   Mechanical Vent - Volume Ctrl Settings: Routine  Ventilator Mode: AC/CMV  Targeted SpO2 Range (%): 88-97  Tidal Volume:  430   Respiratory Rate:  12   FiO2:  40  PEEP:  8     T(C): 37.3 (10-19-24 @ 05:19), Max: 37.3 (10-19-24 @ 05:19)  HR: 81 (10-19-24 @ 05:45) (81 - 87)  BP: 102/59 (10-18-24 @ 13:00) (102/59 - 105/64)  RR: 17 (10-19-24 @ 05:45) (12 - 19)  SpO2: 96% (10-19-24 @ 05:45) (93% - 100%)    Physical Exam   GA: no apparent distress  HEENT right eye conjunctival/scleral injection, neck supple, tracheostomy in place   RESP: No respiratory distress, CTA b/l, no WRR  CV: RRR, +S1S2  GI: Soft, NT, ND  Extr: peripheral pulses radial/TP present symmetric   NEURO:   Mental Status: awake/eyes open, maintaining eyes opening spontaneously intermittent downward gaze, not convincingly following commands (attempted to ask to look downward or wiggle fingers in Maltese)  CNs: Disconjugated gaze, left eye some abduction and adduction movements noted, spontaneous vs. purposeful downward intermittent eye movements, pupils equally round and reactive to light, corneal + L >> R,  cough ++, breathing over vent  Motor: head/neck movement to noxious, extensor posturing to noxious at the bilateral upper extremities, triple flexion at the bilateral lower extremities       ASSESSMENT/PLAN  43 y/o PMH ?stroke/MI present to [facility] after collapsing at work. Decorticate posturing, vomiting, intubated for airway protection. 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 Transferred to [facility] for further management. s/p trach 10/14. Course complicated by persistent encephalopathy/wakeful unresponsiveness, respiratory failure, VAP.       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   Data   - CTA head and neck 09/30: Re-demonstration of pontine hemorrhage with associated edema and mass effect upon the fourth ventricle and cerebral aqueduct. No underlying no malformation is identified. No increase in ventricular size since the   prior study.  - CT head 10/03: Again noted is a parenchymal hematoma in the nabil, with associated  expansion and surrounding vasogenic edema. There is mild spillage into the subarachnoid spaces at the cistern and within sulci in the right frontal lobe. All of these findings appear stable, without interval rebleeding. There is a chronic hemorrhagic infarct in the left external capsule/corona radiata. There is a small chronic white matter infarct vs small vessel disease in the right corona radiata. Moderate generalized cerebral volume loss, with distention of the sulci and concomitant ex-vacuo ventricular dilatation. Mild nonspecific low attenuation in the periventricular and subcortical white matter. No midline shift or herniation. No CT evidence of acute territorial infarction, although MRI with DWI would be more sensitive. Limited views of the sinuses and mastoids show mild mucosal thickening without air-fluid levels, likely chronic. An ETT is noted in place. Limited views of the orbits and visualized soft tissues of the neck, face, scalp, skull base, and calvarium are otherwise unremarkable. IMPRESSION: No significant change, without interval rebleeding.  - MRI brain 10/12: Similar-appearing evolving acute parenchymal hemorrhage within the nabil with surrounding mass effect and edema. No underlying enhancement. No new areas of acute intracranial hemorrhage. Wedge-shaped acute/subacute infarct within the right lateral cerebellar hemisphere. Unchanged encephalomalacia and gliosis within the left external capsule related to remote hemorrhage in this location.  - vEEG 10/17: with predominant delta slowing with superimposed geo and beta/some reactivity present     Plan  BP goal 100-150, MAP > 65  Sinemet and Amantadine to promote awakening   STAT CT head for any acute neurological change   Off sedaton  PRN fentanyl for vent synchrony/ discomfort   Plan for long term vent rehab facility     CV   Diagnosis: Uncontrolled hypertension at presentation; now episodes of hypotension  Data  - ECHO 9/30 hyperdynamic left ventricular function EF 70%  Plan  SBP goal 100-150, MAP > 65  Labetalol ---> decrease to 200 q8  Hydralazine ---> decrease to 25q8  Continue amlodipine 10 mg daily   Telemetry monitoring      PULM   Diagnosis:  Intubated for airway protection in the setting of acute encephalopathy; persistent respiratory failure; S/P tracheostomy; VAP   Data  Chest x-ray 10/17: Similar to prior exam 10/16/2024 with right lung base partial atelectasis. Nasogastric tube tip below hemidiaphragm. No pneumothorax. No acute infiltrate appearing. Possible minimal right pleural effusion. No left pleural effusion.  ABG - ( 17 Oct 2024 14:40 )  pH, Arterial: 7.37  pH, Blood: x     /  pCO2: 34    /  pO2: 117   / HCO3: 20    / Base Excess: -4.8  /  SaO2: 98.9    Plan   Currently on full vent support ---> PS as tolerated  Pulm toilet/chest PT/Nebs  pulse-oxymetry monitoring   Antibiotics for VAP    RENAL   Diagnosis: ANIKA on CKD unknown baseline) - - > now improving; urinary retention   Data  BUN/Cr 47/3/36 <-- 48/3.40   i/o    10-18-24 @ 07:01  -  10-19-24 @ 07:00  --------------------------------------------------------  IN: 1925 mL / OUT: 1535 mL / NET: 390 mL    135  |  104  |  47[H]  ----------------------------<  114[H]  4.9   |  20[L]  |  3.36[H]    Ca    8.7      19 Oct 2024 05:30  Phos  4.5     10-19  Mg     2.2     10-19    Plan   Euvolemia/normonatremia goal  KVO  FWF 100q4  Monitor for hyperkalemia/monitor I/O  Failing trial of void/monitor for retention   Start receiving tamsulosin 0.8  Start doxazosin (monitor BP)  Renal following   Avoid nephrotoxic medications    ID  Diagnosis: Febrile/hypoxia 10/15 c/w Ventilator Associated PNA ++Acitonobacter B. ++MSSA   Data  Blood Cx 10/15: NGTD   Sputum Cx 10/16: Acinetobacter B.   Procalcitonin 10/16: 0.36 <-- 0.13   Plan   - S/P Ancef --> restarted on broad spectrum antibiotics on 10/16 Vancomycin and Zosyin --> given sensitivity narrow to Unasyn (renally dosed) continue for 7 day course for VAP  Follow-up cultures results  monitor WBC and fever curve   culture if spikes 38.3C    GI  Diagnosis: Dysphagia secondary to encephalopathy/hemorrhagic stroke, constipation   Data  Plan   NGT ----> PEG planning (10/23 with surgery)  Diet: enteral feeding at goal  Free water flashes 100 q4 (KVO)  GI prophylaxis while intubated   Bowel regimen + suppository    HEME/ONC  Diagnosis: thrombocytosis   Data  CBC Full  -  ( 19 Oct 2024 05:30 )  WBC Count : 6.93 K/uL  RBC Count : 3.42 M/uL  Hemoglobin : 9.5 g/dL  Hematocrit : 31.0 %  Platelet Count - Automated : 460 K/uL  Mean Cell Volume : 90.6 fl  Mean Cell Hemoglobin : 27.8 pg  Mean Cell Hemoglobin Concentration : 30.6 gm/dL    Plan   Monitor H&H  Hbg goal > 7.0, PLT > 100  SDCs + heparin SQ for DVT prophylaxis  LE venous ultrasound to rule-out DVTs given thrombocytosis     ENDO  Diagnosis: no active issues   Data  A1c: 5.3  PLAN:   Goal euglycemia (-180)      CODE STATUS: FULL CODE  Patient's son (Chauncey Reyes) updated at bedside with . He understands the current clinical condition of his father and would like for him to continue receiving full life sustaining measures.     DISPO: NeuroICU      Jazlyn Tomlin MD   Neurocritical Care Attending

## 2024-10-19 NOTE — PROGRESS NOTE ADULT - SUBJECTIVE AND OBJECTIVE BOX
[facility] ADDITIONAL [facility]GRESS [facility]    Nighttime rounds were performed     Patient is a 43 y/o male with unknown past medical history (reported stroke and MI by coworkers). He presented to [facility] with AMS. Prior to this, pt was working at [employer] when he was found down by coworkers. EMS called and pt brought to [facility] ED. Intubated, sedated, started on cardene for SBPs in 200s. CT head showed pontine bleed. ICH score: 3. Transferred to NSICU for further management.  (30 Sep 2024 12:55)      ICU Vital Signs Last 24 Hrs  T(C): 37.1 (19 Oct 2024 18:00), Max: 37.3 (19 Oct 2024 05:19)  T(F): 98.8 (19 Oct 2024 18:00), Max: 99.1 (19 Oct 2024 05:19)  HR: 82 (19 Oct 2024 20:39) (76 - 87)  ABP: 119/68 (19 Oct 2024 20:39) (102/55 - 136/72)  ABP(mean): 85 (19 Oct 2024 20:39) (71 - 94)  RR: 18 (19 Oct 2024 20:39) (13 - 18)  SpO2: 97% (19 Oct 2024 20:39) (93% - 100%)      10-18-24 @ 07:01  -  10-19-24 @ 07:00  --------------------------------------------------------  IN: 1925 mL / OUT: 2185 mL / NET: -260 mL    10-19-24 @ 07:01  -  10-19-24 @ 20:53  --------------------------------------------------------  IN: 1000 mL / OUT: 800 mL / NET: 200 mL      Mode: AC/ CMV (Assist Control/ Continuous Mandatory Ventilation), RR (machine): 12, TV (machine): 430, FiO2: 40, PEEP: 8, ITime: 1.2, MAP: 11, PIP: 18      PHYSICAL EXAM:  Neuro: Eyes open; not tracking, corneals present bilaterally, does not doll to movement of head, +cough, overbreathes vent set rate  B/L uppers extends (at times trace withdraws RUE) , b/l  LE TFs (at times WD LLE)  Not following commands  Pulm: Diminshed  Cards: S1, S2, RRR  GI: Soft, Nontender, Nondistended   Extremities: No LE edema       MEDICATIONS:   acetaminophen   Oral Liquid .. 650 milliGRAM(s) Oral every 6 hours PRN  acetylcysteine 10%  Inhalation 4 milliLiter(s) Inhalation every 12 hours  albuterol/ipratropium for Nebulization 3 milliLiter(s) Nebulizer every 12 hours  amantadine 100 milliGRAM(s) Oral <User Schedule>  amLODIPine   Tablet 10 milliGRAM(s) Oral daily  ampicillin/sulbactam  IVPB 3 Gram(s) IV Intermittent <User Schedule>  bisacodyl Suppository 10 milliGRAM(s) Rectal daily PRN  carbidopa/levodopa  25/100 1 Tablet(s) Oral <User Schedule>  chlorhexidine 2% Cloths 1 Application(s) Topical <User Schedule>  doxazosin 2 milliGRAM(s) Oral at bedtime  erythromycin   Ointment 1 Application(s) Right EYE every 6 hours  heparin   Injectable 5000 Unit(s) SubCutaneous every 8 hours  hydrALAZINE 25 milliGRAM(s) Oral every 8 hours  labetalol 200 milliGRAM(s) Oral every 8 hours  oxyCODONE    IR 5 milliGRAM(s) Oral every 4 hours PRN  pantoprazole  Injectable 40 milliGRAM(s) IV Push daily  petrolatum Ophthalmic Ointment 1 Application(s) Both EYES two times a day  polyethylene glycol 3350 17 Gram(s) Oral two times a day  senna 2 Tablet(s) Oral at bedtime  tamsulosin 0.8 milliGRAM(s) Oral at bedtime      LABS:                       9.5    6.93  )-----------( 460      ( 19 Oct 2024 05:30 )             31.0     10-19    135  |  104  |  47[H]  ----------------------------<  114[H]  4.9   |  20[L]  |  3.36[H]    Ca    8.7      19 Oct 2024 05:30  Phos  4.5     10-19  Mg     2.2     10-19      ASSESSMENT/PLAN:  71 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 Severe slowing. No seizures. DCed  Analgesia prn  Palliative/ethics following    PULM:   S/P trach  Full vent support. CPAP as tolerated  Pulm toilet    CV:  SBP goal 100-160  Labetalol, hydralazine, amlodipine (titrate)    RENAL: CKD (superimposed ANIKA). Hyponatremia resolved.  Na goal 135-145. Monitor BMPs  Vuong catheter  Flomax 0.8mg, started on cardura 2mg  Nephrology following    GI:  Diet: TFs at goal via NGT  PEG pending  GI prophylaxis [] not indicated [x] PPI [] other:  Bowel regimen [] colace [x] senna [] other: miralax  LBM: 10/17    ENDO:   Goal euglycemia (-180)  A1c: 5.3    HEME/ONC: Mild thrombocytosis  VTE prophylaxis: [x] SCDs [x] chemoprophylaxis SQH  LE dopplers negative 10/19    ID: Febrile; recurrent  Cultures: Blood cultures; NGTD, sputum acetinobacter baumanii  Antibiotics:  Zosyn->Unasyn  re: acetinobacter baumanii    Patient remains critically ill.    Additional 45 minutes of critical care time.

## 2024-10-20 LAB
ANION GAP SERPL CALC-SCNC: 10 MMOL/L — SIGNIFICANT CHANGE UP (ref 5–17)
ANION GAP SERPL CALC-SCNC: 12 MMOL/L — SIGNIFICANT CHANGE UP (ref 5–17)
BUN SERPL-MCNC: 51 MG/DL — HIGH (ref 7–23)
BUN SERPL-MCNC: 51 MG/DL — HIGH (ref 7–23)
CALCIUM SERPL-MCNC: 8.8 MG/DL — SIGNIFICANT CHANGE UP (ref 8.4–10.5)
CALCIUM SERPL-MCNC: 9 MG/DL — SIGNIFICANT CHANGE UP (ref 8.4–10.5)
CHLORIDE SERPL-SCNC: 106 MMOL/L — SIGNIFICANT CHANGE UP (ref 96–108)
CHLORIDE SERPL-SCNC: 106 MMOL/L — SIGNIFICANT CHANGE UP (ref 96–108)
CO2 SERPL-SCNC: 20 MMOL/L — LOW (ref 22–31)
CO2 SERPL-SCNC: 22 MMOL/L — SIGNIFICANT CHANGE UP (ref 22–31)
CREAT SERPL-MCNC: 3.39 MG/DL — HIGH (ref 0.5–1.3)
CREAT SERPL-MCNC: 3.4 MG/DL — HIGH (ref 0.5–1.3)
CULTURE RESULTS: SIGNIFICANT CHANGE UP
EGFR: 22 ML/MIN/1.73M2 — LOW
GAS PNL BLDA: SIGNIFICANT CHANGE UP
GLUCOSE BLDC GLUCOMTR-MCNC: 112 MG/DL — HIGH (ref 70–99)
GLUCOSE SERPL-MCNC: 119 MG/DL — HIGH (ref 70–99)
GLUCOSE SERPL-MCNC: 99 MG/DL — SIGNIFICANT CHANGE UP (ref 70–99)
HCT VFR BLD CALC: 29.4 % — LOW (ref 39–50)
HGB BLD-MCNC: 9.1 G/DL — LOW (ref 13–17)
MAGNESIUM SERPL-MCNC: 2.3 MG/DL — SIGNIFICANT CHANGE UP (ref 1.6–2.6)
MCHC RBC-ENTMCNC: 28.6 PG — SIGNIFICANT CHANGE UP (ref 27–34)
MCHC RBC-ENTMCNC: 31 GM/DL — LOW (ref 32–36)
MCV RBC AUTO: 92.5 FL — SIGNIFICANT CHANGE UP (ref 80–100)
NRBC # BLD: 0 /100 WBCS — SIGNIFICANT CHANGE UP (ref 0–0)
NRBC BLD-RTO: 0 /100 WBCS — SIGNIFICANT CHANGE UP (ref 0–0)
PHOSPHATE SERPL-MCNC: 4.8 MG/DL — HIGH (ref 2.5–4.5)
PLATELET # BLD AUTO: 412 K/UL — HIGH (ref 150–400)
POTASSIUM SERPL-MCNC: 5.2 MMOL/L — SIGNIFICANT CHANGE UP (ref 3.5–5.3)
POTASSIUM SERPL-MCNC: 5.4 MMOL/L — HIGH (ref 3.5–5.3)
POTASSIUM SERPL-SCNC: 5.2 MMOL/L — SIGNIFICANT CHANGE UP (ref 3.5–5.3)
POTASSIUM SERPL-SCNC: 5.4 MMOL/L — HIGH (ref 3.5–5.3)
RBC # BLD: 3.18 M/UL — LOW (ref 4.2–5.8)
RBC # FLD: 12.9 % — SIGNIFICANT CHANGE UP (ref 10.3–14.5)
SODIUM SERPL-SCNC: 138 MMOL/L — SIGNIFICANT CHANGE UP (ref 135–145)
SODIUM SERPL-SCNC: 138 MMOL/L — SIGNIFICANT CHANGE UP (ref 135–145)
SPECIMEN SOURCE: SIGNIFICANT CHANGE UP
WBC # BLD: 6.29 K/UL — SIGNIFICANT CHANGE UP (ref 3.8–10.5)
WBC # FLD AUTO: 6.29 K/UL — SIGNIFICANT CHANGE UP (ref 3.8–10.5)

## 2024-10-20 PROCEDURE — 99231 SBSQ HOSP IP/OBS SF/LOW 25: CPT | Mod: 25

## 2024-10-20 PROCEDURE — 43752 NASAL/OROGASTRIC W/TUBE PLMT: CPT

## 2024-10-20 PROCEDURE — 99291 CRITICAL CARE FIRST HOUR: CPT

## 2024-10-20 PROCEDURE — 71045 X-RAY EXAM CHEST 1 VIEW: CPT | Mod: 26

## 2024-10-20 RX ORDER — SODIUM ZIRCONIUM CYCLOSILICATE 5 G/5G
5 POWDER, FOR SUSPENSION ORAL ONCE
Refills: 0 | Status: COMPLETED | OUTPATIENT
Start: 2024-10-20 | End: 2024-10-20

## 2024-10-20 RX ORDER — LABETALOL HYDROCHLORIDE 200 MG/1
100 TABLET, FILM COATED ORAL EVERY 8 HOURS
Refills: 0 | Status: DISCONTINUED | OUTPATIENT
Start: 2024-10-20 | End: 2024-10-21

## 2024-10-20 RX ADMIN — ERYTHROMYCIN 1 APPLICATION(S): 5 OINTMENT OPHTHALMIC at 18:19

## 2024-10-20 RX ADMIN — TAMSULOSIN HYDROCHLORIDE 0.8 MILLIGRAM(S): 0.4 CAPSULE ORAL at 21:51

## 2024-10-20 RX ADMIN — Medication 25 MILLIGRAM(S): at 14:24

## 2024-10-20 RX ADMIN — AMPICILLIN SODIUM AND SULBACTAM SODIUM 200 GRAM(S): 1; .5 INJECTION, POWDER, FOR SOLUTION INTRAMUSCULAR; INTRAVENOUS at 18:18

## 2024-10-20 RX ADMIN — Medication 1 APPLICATION(S): at 05:23

## 2024-10-20 RX ADMIN — Medication 25 MILLIGRAM(S): at 21:50

## 2024-10-20 RX ADMIN — Medication 2 TABLET(S): at 21:50

## 2024-10-20 RX ADMIN — ACETYLCYSTEINE 4 MILLILITER(S): 200 INHALANT RESPIRATORY (INHALATION) at 05:07

## 2024-10-20 RX ADMIN — AMPICILLIN SODIUM AND SULBACTAM SODIUM 200 GRAM(S): 1; .5 INJECTION, POWDER, FOR SOLUTION INTRAMUSCULAR; INTRAVENOUS at 05:22

## 2024-10-20 RX ADMIN — Medication 40 MILLIGRAM(S): at 11:23

## 2024-10-20 RX ADMIN — Medication 1 TABLET(S): at 14:24

## 2024-10-20 RX ADMIN — Medication 1 APPLICATION(S): at 18:19

## 2024-10-20 RX ADMIN — ERYTHROMYCIN 1 APPLICATION(S): 5 OINTMENT OPHTHALMIC at 23:10

## 2024-10-20 RX ADMIN — Medication 80 MILLILITER(S): at 23:59

## 2024-10-20 RX ADMIN — SODIUM ZIRCONIUM CYCLOSILICATE 5 GRAM(S): 5 POWDER, FOR SUSPENSION ORAL at 09:39

## 2024-10-20 RX ADMIN — LABETALOL HYDROCHLORIDE 100 MILLIGRAM(S): 200 TABLET, FILM COATED ORAL at 21:50

## 2024-10-20 RX ADMIN — Medication 100 MILLIGRAM(S): at 05:22

## 2024-10-20 RX ADMIN — LABETALOL HYDROCHLORIDE 200 MILLIGRAM(S): 200 TABLET, FILM COATED ORAL at 05:22

## 2024-10-20 RX ADMIN — LABETALOL HYDROCHLORIDE 100 MILLIGRAM(S): 200 TABLET, FILM COATED ORAL at 14:24

## 2024-10-20 RX ADMIN — Medication 1 TABLET(S): at 07:15

## 2024-10-20 RX ADMIN — HEPARIN SODIUM 5000 UNIT(S): 1000 INJECTION INTRAVENOUS; SUBCUTANEOUS at 21:51

## 2024-10-20 RX ADMIN — ERYTHROMYCIN 1 APPLICATION(S): 5 OINTMENT OPHTHALMIC at 05:23

## 2024-10-20 RX ADMIN — Medication 25 MILLIGRAM(S): at 05:21

## 2024-10-20 RX ADMIN — AMPICILLIN SODIUM AND SULBACTAM SODIUM 200 GRAM(S): 1; .5 INJECTION, POWDER, FOR SOLUTION INTRAMUSCULAR; INTRAVENOUS at 21:50

## 2024-10-20 RX ADMIN — IPRATROPIUM BROMIDE AND ALBUTEROL SULFATE 3 MILLILITER(S): .5; 2.5 SOLUTION RESPIRATORY (INHALATION) at 17:42

## 2024-10-20 RX ADMIN — AMPICILLIN SODIUM AND SULBACTAM SODIUM 200 GRAM(S): 1; .5 INJECTION, POWDER, FOR SOLUTION INTRAMUSCULAR; INTRAVENOUS at 09:38

## 2024-10-20 RX ADMIN — DOXAZOSIN MESYLATE 2 MILLIGRAM(S): 8 TABLET ORAL at 21:50

## 2024-10-20 RX ADMIN — ACETYLCYSTEINE 4 MILLILITER(S): 200 INHALANT RESPIRATORY (INHALATION) at 17:42

## 2024-10-20 RX ADMIN — HEPARIN SODIUM 5000 UNIT(S): 1000 INJECTION INTRAVENOUS; SUBCUTANEOUS at 05:21

## 2024-10-20 RX ADMIN — SODIUM ZIRCONIUM CYCLOSILICATE 5 GRAM(S): 5 POWDER, FOR SUSPENSION ORAL at 11:23

## 2024-10-20 RX ADMIN — AMPICILLIN SODIUM AND SULBACTAM SODIUM 200 GRAM(S): 1; .5 INJECTION, POWDER, FOR SOLUTION INTRAMUSCULAR; INTRAVENOUS at 14:23

## 2024-10-20 RX ADMIN — ERYTHROMYCIN 1 APPLICATION(S): 5 OINTMENT OPHTHALMIC at 11:24

## 2024-10-20 RX ADMIN — AMPICILLIN SODIUM AND SULBACTAM SODIUM 200 GRAM(S): 1; .5 INJECTION, POWDER, FOR SOLUTION INTRAMUSCULAR; INTRAVENOUS at 01:52

## 2024-10-20 RX ADMIN — Medication 10 MILLIGRAM(S): at 09:38

## 2024-10-20 RX ADMIN — AMLODIPINE BESYLATE 10 MILLIGRAM(S): 10 TABLET ORAL at 05:22

## 2024-10-20 RX ADMIN — HEPARIN SODIUM 5000 UNIT(S): 1000 INJECTION INTRAVENOUS; SUBCUTANEOUS at 12:05

## 2024-10-20 RX ADMIN — IPRATROPIUM BROMIDE AND ALBUTEROL SULFATE 3 MILLILITER(S): .5; 2.5 SOLUTION RESPIRATORY (INHALATION) at 05:07

## 2024-10-20 RX ADMIN — Medication 1 APPLICATION(S): at 05:24

## 2024-10-20 RX ADMIN — POLYETHYLENE GLYCOL 3350 17 GRAM(S): 17 POWDER, FOR SOLUTION ORAL at 05:22

## 2024-10-20 NOTE — PROGRESS NOTE ADULT - SUBJECTIVE AND OBJECTIVE BOX
SUBJECTIVE: 44 male, with PMHx of stroke and MI as reported by patients coworkes, he presented to [facility] with AMS, Pt was working at [employer] when he was found down by coworkers. EMS called and pt brought to [facility] ED. Intubated, sedated, started on cardene for SBPs in 200s. CT head showed brain stem bleed. Transferred to NSICU on 09/30/24 for further management of the pontine hemorrhage with SAH extension. Patient currently spontaneously breathing with CPAP, neuro exam slightly improving with hopes of possible partial recovery although very limited. Patients surrogate decision maker (Olivier), the patients son, had an extensive discussiong with Dr. Davison about GOC and son wants a trachoestomy and a PEG placement to prolong his life       MEDICATIONS  (STANDING):  acetylcysteine 10%  Inhalation 4 milliLiter(s) Inhalation every 12 hours  albuterol/ipratropium for Nebulization 3 milliLiter(s) Nebulizer every 12 hours  amantadine 100 milliGRAM(s) Oral <User Schedule>  amLODIPine   Tablet 10 milliGRAM(s) Oral daily  ampicillin/sulbactam  IVPB 3 Gram(s) IV Intermittent <User Schedule>  carbidopa/levodopa  25/100 1 Tablet(s) Oral <User Schedule>  chlorhexidine 2% Cloths 1 Application(s) Topical <User Schedule>  doxazosin 2 milliGRAM(s) Oral at bedtime  erythromycin   Ointment 1 Application(s) Right EYE every 6 hours  heparin   Injectable 5000 Unit(s) SubCutaneous every 8 hours  hydrALAZINE 25 milliGRAM(s) Oral every 8 hours  labetalol 100 milliGRAM(s) Oral every 8 hours  pantoprazole  Injectable 40 milliGRAM(s) IV Push daily  petrolatum Ophthalmic Ointment 1 Application(s) Both EYES two times a day  polyethylene glycol 3350 17 Gram(s) Oral two times a day  senna 2 Tablet(s) Oral at bedtime  sodium chloride 0.9%. 1000 milliLiter(s) (80 mL/Hr) IV Continuous <Continuous>  tamsulosin 0.8 milliGRAM(s) Oral at bedtime    MEDICATIONS  (PRN):  acetaminophen   Oral Liquid .. 650 milliGRAM(s) Oral every 6 hours PRN Temp greater or equal to 38C (100.4F)  bisacodyl Suppository 10 milliGRAM(s) Rectal daily PRN Constipation  oxyCODONE    IR 5 milliGRAM(s) Oral every 4 hours PRN Mild Pain (1 - 3)      Vital Signs Last 24 Hrs  T(C): 36.9 (20 Oct 2024 09:16), Max: 37.2 (20 Oct 2024 00:44)  T(F): 98.4 (20 Oct 2024 09:16), Max: 99 (20 Oct 2024 00:44)  HR: 85 (20 Oct 2024 10:00) (76 - 86)  BP: --  BP(mean): --  RR: 24 (20 Oct 2024 10:00) (12 - 24)  SpO2: 100% (20 Oct 2024 10:00) (96% - 100%)    Parameters below as of 20 Oct 2024 10:00  Patient On (Oxygen Delivery Method): ventilator    O2 Concentration (%): 40    Physical Exam  General: AAOx3, NAD, laying comfortably in bed  Cardio: S1,S2, No MRG  Pulm: Nonlabored breathing  Abdomen: soft, non-distended, non-tender, no scars noted. Has a rectal tube in place and is making BMs.   Extremities: WWP, peripheral pulses appreciated                  I&O's Detail    19 Oct 2024 07:01  -  20 Oct 2024 07:00  --------------------------------------------------------  IN:    Enteral Tube Flush: 120 mL    Free Water: 400 mL    IV PiggyBack: 400 mL    Jevity 1.5: 680 mL  Total IN: 1600 mL    OUT:    Indwelling Catheter - Urethral (mL): 1185 mL    Intermittent Catheterization - Urethral (mL): 450 mL  Total OUT: 1635 mL    Total NET: -35 mL      20 Oct 2024 07:01  -  20 Oct 2024 10:45  --------------------------------------------------------  IN:    Enteral Tube Flush: 80 mL    Free Water: 150 mL    IV PiggyBack: 100 mL  Total IN: 330 mL    OUT:    Indwelling Catheter - Urethral (mL): 310 mL  Total OUT: 310 mL    Total NET: 20 mL          LABS:                        9.1    6.29  )-----------( 412      ( 20 Oct 2024 05:30 )             29.4     10-20    138  |  106  |  51[H]  ----------------------------<  119[H]  5.4[H]   |  22  |  3.40[H]    Ca    8.8      20 Oct 2024 05:30  Phos  4.8     10-20  Mg     2.3     10-20        Urinalysis Basic - ( 20 Oct 2024 05:30 )    Color: x / Appearance: x / SG: x / pH: x  Gluc: 119 mg/dL / Ketone: x  / Bili: x / Urobili: x   Blood: x / Protein: x / Nitrite: x   Leuk Esterase: x / RBC: x / WBC x   Sq Epi: x / Non Sq Epi: x / Bacteria: x        RADIOLOGY & ADDITIONAL STUDIES:

## 2024-10-20 NOTE — DISCHARGE NOTE PROVIDER - HOSPITAL COURSE
HPI: 45 yo male, unknown past medical history (reported stroke and MI by coworkers) presented to [facility] with AMS, Pt was working at [employer] when he was found down by coworkers. EMS called and pt brought to [facility] ED. Intubated, sedated, started on cardene for SBPs in 200s. CT head showed brain stem bleed. (NIHSS 33, ICH score 3).  Transferred to NSICU for further management.     Hospital Course:  9/30: transferred from [facility]. A line placed. Versed dc'd. Cy Rader at bedside, states pt has family in [place], cannot confirm medications or PMH other than stroke and MI. 250cc bolus 3% given. LR switched to NS. hydralazine 25q8 started, 3% started, switched propofol to precedex   10/1: stability CTH done. Added labetalol, started TF. Palliative consulted. ethics consulted to determine surrogate. febrile 103, pan cx sent  10/2: BD 2, GEORGE overnight. TF resumed. Desatt'd to 80s, FiO2 inc. to 50. Fentanyl given, ABG, CXR ordered. Maxxed on precedex, started on propofol for DARIEN -4 - -5. Precedex dc'd. Duonebs, mucomyst, hypertonic added. 3% dc'd. Cardene dc'd. Start vanc/CTX. Increased labetalol 200q8. MRSA negative, dc'd vanc. ETT pulled back 2cm x 2, good positioning after confirmatory chest xray. Ethics attempting to establish HCP with family. Na 159, starting FW 250q6 for range 150-155.   10/3: BD3, GEORGE o/n, neuro stable. Na elevating, FW increased to 300q6. Dc'd bowel reg for diarrhea. vEEG started. SQH 5000q8 tonight.   10/4: BD 4, albumn bolus, incr. LR to 80 2/2 incr. in Cr, LR to 100cc/hr for uptrending Cr. Started 7% hypersal for 48hrs and SL atropine for inline/oral thick secretions. Dc'd CTX and started ancef for MSSA in the sputum. Nephrology consulted for CKD, f/u recs. SBP 170s, given hydralazine 10mg IVP.   10/5: BD5, o/n 10mg IVP hydralazine given for SBP 170s and started on hydralazine 25q8 via OGT. 10mg IV push labetalol for SBP > 160s. RT placed for diarrhea.   10/6: BD6, o/n FW increased to 350q4 per nephrology recs. IV tylenol for temp 100.6, SBp 160s presumed uncomfortable.   10/7: BD7, overnight pancultured for temp 101.8F.   10/8: BD8. GEORGE. Cr bumped. decreased LR to 75cc/hr. Adding simethicone ATC. incr hydralazine 50mgTID. Incr labetalol 300mgTID. Na 145, decreased FWF to 250q6. Start precedex. FENa consistent with intrinsic kidney injury. Pend repeat renal US. Retaining up to 1.3L, bladder scans q6, straight cath PRN  10/9: BD 9. GEORGE overnight. Neuro stable. abd xray for distention w non-specific gas pattern, OGT to LIWS for morning. duonebs/mucomyst to q8 for improving secretions. Changed tube feeds to Jevity 1.5 20cc/hr, low rate due to abdominal distention, nepro dense and more difficult to digest. Tolerating CPAP, confirmed by ABG.   10/10: BD 10. GEORGE overnight. Neuro stable. (+) gabriel for urinary retention on bladder scan. inc TF to goal rate of 40cc/hr. family leaning toward pursuing trach/PEG. 1/2 amp for FS 81.   10/11: BD 11. GEORGE overnight. Neuro stable. Trach/PEG consults placed.   10/12: BD 12. GEORGE overnight. Neuro stable. MRI brain complete.   10/13: BD 13. Increase flomax. Hold SQH after PM dose for trach tm. IVL.   10/14: BD 14. GEORGE overnight, remains on AC/VC. Gabriel placed for urinary retention. Dc'd free water.  S/p trach with pulm. NGT placed and CXR confirmed in good position.   10/15: BD 15, GEORGE ovn. resumed feeds. spiked 101, pan cx sent.   10/16: BD 16. GEORGE ovn. Lokelma 5mg for K+ 5.4. Started vanc q 24/zosyn for empiric PNA coverage, IVF to 100/hr. PEG held for fever.   10/17: BD 17,  ordered serum osm and urine osm for am. Started sinemet for neurostimulation. Increased cardura to 0.8. Started FW 100q4, dc'd IVF. MRSA negative, dc'd vanc. NGT replaced d/t coiling.   10/18: BD 18, GEORGE overnight, neuro stable. Amantadine added for neurostim. zosyn changed to unasyn for acinetobacter baumannii, failed TOV and required SC  10/19: BD 19, GEORGE ovn. cardura 2mg added for retention. labetalol decreased 200q8, hydralazine decreased 25q8. Gabriel replaced.     Patient evaluated by PT/OT who recommended:  Patient is going home? rehab? hospice? Facility Name:     Hospital course c/b:     Exam on day of discharge:    Checklist:   - Obtained follow up appointment from NP  - Reviewed final recommendations of inpatient consults  - review discharge planning on provider handoff  - post op imaging completed  - Neurologically stable for discharge  - Vitals stable for discharge   - Afebrile for discharge  - WBC is stable  - Sodium level is normal  - Pain is adequately controlled  - Pt has PICC/walker/brace/collar   - LACE score (10 or > needs PCP apt)   - stroke patient? Discharge NIHSS score   HPI: 43 yo male, unknown past medical history (reported stroke and MI by coworkers) presented to [facility] with AMS, Pt was working at [employer] when he was found down by coworkers. EMS called and pt brought to [facility] ED. Intubated, sedated, started on cardene for SBPs in 200s. CT head showed brain stem bleed. (NIHSS 33, ICH score 3).  Transferred to NSICU for further management.     Hospital Course:  Found to have 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 Transferred to [facility] for further management. s/p trach 10/14. s/p peg 10/21. Re-upgrade to ICU 2/2 desaturation event and suctioning requirements 11/15. Re-upgrade to NSICU 12/15 2/2 desaturation and tachycardia.      Patient evaluated by PT/OT who recommended:  Patient is going home? rehab? hospice? Facility Name:     Hospital course c/b:   seizures (controlled on Keppra and Dilantin)  tachycardia  hypertension  chronic respiratory failure (s/p tracheostomy)  dysphagia (s/p PEG tube)  urinary retention   pneumonia (completed several courses of antibiotics)       Exam on day of discharge:    Checklist:   - Obtained follow up appointment from NP  - Reviewed final recommendations of inpatient consults  - review discharge planning on provider handoff  - post op imaging completed  - Neurologically stable for discharge  - Vitals stable for discharge   - Afebrile for discharge  - WBC is stable  - Sodium level is normal  - Pain is adequately controlled  - Pt has PICC/walker/brace/collar   - LACE score (10 or > needs PCP apt)   - stroke patient? Discharge NIHSS score   HPI: 43 yo male, unknown past medical history (reported stroke and MI by coworkers) presented to [facility] with AMS, Pt was working at [employer] when he was found down by coworkers. EMS called and pt brought to [facility] ED. Intubated, sedated, started on cardene for SBPs in 200s. CT head showed brain stem bleed. (NIHSS 33, ICH score 3).  Transferred to NSICU for further management.     Hospital Course:  Found to have 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 Transferred to [facility] for further management. s/p trach 10/14. s/p peg 10/21. Re-upgrade to ICU 2/2 desaturation event and suctioning requirements 11/15. Re-upgrade to NSICU 12/15 2/2 desaturation and tachycardia.      Patient evaluated by PT/OT who recommended: skilled nursing facility   Patient is going home? rehab? hospice? Facility Name:     Hospital course c/b:   seizures (controlled on Keppra and Dilantin)  tachycardia  hypertension  chronic respiratory failure (s/p tracheostomy)  dysphagia (s/p PEG tube)  urinary retention   pneumonia (completed several courses of antibiotics)   waxing and waning   keratitis     Exam on day of discharge:    Checklist:   - Obtained follow up appointment from NP  - Reviewed final recommendations of inpatient consults  - review discharge planning on provider handoff  - post op imaging completed  - Neurologically stable for discharge  - Vitals stable for discharge   - Afebrile for discharge  - WBC is stable  - Sodium level is normal  - Pain is adequately controlled  - Pt has PICC/walker/brace/collar   - LACE score (10 or > needs PCP apt)   - stroke patient? Discharge NIHSS score   HPI: 43 yo male, unknown past medical history (reported stroke and MI by coworkers) presented to [facility] with AMS, Pt was working at [email or web address] when he was found down by coworkers. EMS called and pt brought to [facility] ED. Intubated, sedated, started on cardene for SBPs in 200s. CT head showed brain stem bleed. (NIHSS 33, ICH score 3).  Transferred to NSICU for further management.     Hospital Course:  Found to have 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 Transferred to [facility] for further management. s/p trach 10/14. s/p peg 10/21. Re-upgrade to ICU 2/2 desaturation event and suctioning requirements 11/15. Re-upgrade to NSICU 12/15 2/2 desaturation and tachycardia.      Patient evaluated by PT/OT who recommended: skilled nursing facility   Patient is going home? rehab? hospice? Facility Name:     Hospital course c/b:   seizures (controlled on Keppra, Dilantin, Vimpat, Gabapentin)  tachycardia/hypertension (controlled on metoprolol)   chronic respiratory failure (s/p tracheostomy)  dysphagia (s/p PEG tube)  urinary retention (gabriel catheter in place)  pneumonia (completed several courses of antibiotics)   waxing and waning   keratitis (improved with erythromycin ointment, artificial tears, lacrilube q4, moisture chamber)   pneumonia, UTI, bacteremia (completed several courses of antibiotics)     Exam on day of discharge:  Constitutional: Pt found in bed in NAD   ENT: PERRL, horizontal EOMi   Respiratory: breathing non-labored, symmetrical chest wall movement  Cardiovascuar: RRR, no murmurs  Gastrointestinal: abdomen soft, non tender  Neurological:  A&O x1-3 with hand squeezing, nonverbal   Motor: trace movement in RUE (intermittently RUE squeezes hand to command), RLE withdraws from noxious (intermittently RLE wiggles toes to command), LLE trace withdraw, LUE 0/5    Patient is neuro stable, vitals stable, afebrile, medically ready for discharge     Given ongoing treatment plan, patient has a high potential for further admission to deliver ongoing treatment and/or procedure as part of the normal course of neurosurgical care HPI: 45 yo male, unknown past medical history (reported stroke and MI by coworkers) presented to [facility] with AMS, Pt was working at [employer] when he was found down by coworkers. EMS called and pt brought to [facility] ED. Intubated, sedated, started on cardene for SBPs in 200s. CT head showed brain stem bleed. (NIHSS 33, ICH score 3).  Transferred to NSICU for further management.     Hospital Course:  Found to have 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 Transferred to [facility] for further management. s/p trach 10/14. s/p peg 10/21. Re-upgrade to ICU 2/2 desaturation event and suctioning requirements 11/15. Re-upgrade to NSICU 12/15 2/2 desaturation and tachycardia.      Patient evaluated by PT/OT who recommended: skilled nursing facility   Patient is going to [facility]    Hospital course c/b:   seizures (controlled on Keppra, Dilantin, Vimpat, Gabapentin)  tachycardia/hypertension (controlled on metoprolol)   chronic respiratory failure (s/p tracheostomy)  dysphagia (s/p PEG tube)  urinary retention (gabriel catheter in place)  pneumonia (completed several courses of antibiotics)   waxing and waning   keratitis (improved with erythromycin ointment, artificial tears, lacrilube q4, moisture chamber)   pneumonia, UTI, bacteremia (completed several courses of antibiotics)     Exam on day of discharge:  Constitutional: Pt found in bed in NAD   ENT: PERRL, horizontal EOMi   Respiratory: breathing non-labored, symmetrical chest wall movement  Cardiovascuar: RRR, no murmurs  Gastrointestinal: abdomen soft, non tender  Neurological:  A&O x1-3 with hand squeezing, nonverbal   Motor: trace movement in RUE (intermittently RUE squeezes hand to command), RLE withdraws from noxious (intermittently RLE wiggles toes to command), LLE trace withdraw, LUE 0/5    Patient is neuro stable, vitals stable, afebrile, medically ready for discharge     Palliative care and ethics involved in case.   Per conversations with Dr. D'amico from Neurosurgery and Dr. Farley from Neurology, given patients severe neurological deficit with no further expected recovery and that patient may continue to have seizures, son (and decision maker) has been made aware that there is no plan for any escalation of care that can be decided by medical providers. There is no plan to treat any infections or acute medical conditions as it is deemed medically futile.      Given ongoing treatment plan, patient has a high potential for further admission to deliver ongoing treatment and/or procedure as part of the normal course of neurosurgical care

## 2024-10-20 NOTE — PROGRESS NOTE ADULT - SUBJECTIVE AND OBJECTIVE BOX
Neurocritical Care Attending Note     HPI: Mr. Reyes is a 43 y/o gentleman, originally from [place], with unknown past medical history (reported stroke and MI by coworkers). He presented to [facility] with AMS. Prior to this, he was working at [employer] when he was found down by coworkers. EMS called and pt brought to [facility] ED. Intubated, sedated, started on cardene for hypertensive emergency SBPs in 200s. CT head showed pontine bleed. Transferred to NSICU for further management.  (30 Sep 2024 12:55).    Hospital Course/Interval Events  9/30: transferred from [facility]. A line placed. Versed dc'd. Cy Rader at bedside, states pt has family in [place], cannot confirm medications or PMH other than stroke and MI. 250cc bolus 3% given. LR switched to NS. hydralazine 25q8 started, 3% started, switched propofol to precedex   10/1: stability CTH done. Added labetalol, started TF. Palliative consulted. ethics consulted to determine surrogate. febrile 103, pan cx sent  10/2: BD 2, GEORGE overnight. TF resumed. Desatt'd to 80s, FiO2 inc. to 50. Fentanyl given, ABG, CXR ordered. Maxxed on precedex, started on propofol for DARIEN -4 - -5. Precedex dc'd. Duonebs, mucomyst, hypertonic added. 3% dc'd. Cardene dc'd. Start vanc/CTX. Increased labetalol 200q8. MRSA negative, dc'd vanc. ETT pulled back 2cm x 2, good positioning after confirmatory chest xray. Ethics attempting to establish HCP with family. Na 159, starting FW 250q6 for range 150-155.   10/3: BD3, GEORGE o/n, neuro stable. Na elevating, FW increased to 300q6. Dc'd bowel reg for diarrhea. vEEG started. SQH 5000q8 tonight.   10/4: BD 4, albumn bolus, incr. LR to 80 2/2 incr. in Cr, LR to 100cc/hr for uptrending Cr. Started 7% hypersal for 48hrs and SL atropine for inline/oral thick secretions. Dc'd CTX and started ancef for MSSA in the sputum. Nephrology consulted for CKD, f/u recs. SBP 170s, given hydralazine 10mg IVP.   10/5: BD5, o/n 10mg IVP hydralazine given for SBP 170s and started on hydralazine 25q8 via OGT. 10mg IV push labetalol for SBP > 160s. RT placed for diarrhea.   10/6: BD6, o/n FW increased to 350q4 per nephrology recs. IV tylenol for temp 100.6, SBp 160s presumed uncomfortable.   10/7: BD7, overnight pancultured for temp 101.8F.   10/8: BD8. GEORGE. Cr bumped. decreased LR to 75cc/hr. Adding simethicone ATC. incr hydralazine 50mgTID. Incr labetalol 300mgTID. Na 145, decreased FWF to 250q6. Start precedex. FENa consistent with intrinsic kidney injury. Pend repeat renal US. Retaining up to 1.3L, bladder scans q6, straight cath PRN  10/9: BD 9. GEORGE overnight. Neuro stable. abd xray for distention w non-specific gas pattern, OGT to LIWS for morning. duonebs/mucomyst to q8 for improving secretions. Changed tube feeds to Jevity 1.5 20cc/hr, low rate due to abdominal distention, nepro dense and more difficult to digest. Tolerating CPAP, confirmed by ABG.   10/10: BD 10. GEORGE overnight. Neuro stable. (+) gabriel for urinary retention on bladder scan. inc TF to goal rate of 40cc/hr. family leaning toward pursuing trach/PEG. 1/2 amp for FS 81.   10/11: BD 11. GEORGE overnight. Neuro stable. Trach/PEG consults placed.   10/12: BD 12. GEORGE overnight. Neuro stable. MRI brain complete.   10/13: BD 13. Increase flomax. Hold SQH after PM dose for trach tm. IVL.   10/14: BD 14. GEORGE overnight, remains on AC/VC. Gabriel placed for urinary retention. Dc'd free water.  S/p trach with pulm. NGT placed and CXR confirmed in good position.   10/15: BD 15, GEORGE ovn. resumed feeds. spiked 101, pan cx sent.   10/16: BD 16. GEORGE ovn. Lokelma 5mg for K+ 5.4. Started vanc q 24/zosyn for empiric PNA coverage, IVF to 100/hr. PEG held for fever.   10/17: Na low, ordered serum osm and urine osm for am. Started sinemet for neurostimulation. Increased cardura to 0.8. Started FW 100q4, dc'd IVF.   10/18: BD 18, GEORGE overnight, neuro stable. Amantadine added for neurostim.   10/19: BD 19, GEORGE ovn. cardura 2mg added for retention. labetalol decreased 200q8, hydralazine decreased 25q8. Gabriel replaced.   10/20: BD20, GEORGE overnight. NGT dislodged, replaced.     MEDICATIONS  (STANDING):  acetylcysteine 10%  Inhalation 4 milliLiter(s) Inhalation every 12 hours  albuterol/ipratropium for Nebulization 3 milliLiter(s) Nebulizer every 12 hours  amantadine 100 milliGRAM(s) Oral <User Schedule>  amLODIPine   Tablet 10 milliGRAM(s) Oral daily  ampicillin/sulbactam  IVPB 3 Gram(s) IV Intermittent <User Schedule>  carbidopa/levodopa  25/100 1 Tablet(s) Oral <User Schedule>  chlorhexidine 2% Cloths 1 Application(s) Topical <User Schedule>  doxazosin 2 milliGRAM(s) Oral at bedtime  erythromycin   Ointment 1 Application(s) Right EYE every 6 hours  heparin   Injectable 5000 Unit(s) SubCutaneous every 8 hours  hydrALAZINE 25 milliGRAM(s) Oral every 8 hours  labetalol 200 milliGRAM(s) Oral every 8 hours  pantoprazole  Injectable 40 milliGRAM(s) IV Push daily  petrolatum Ophthalmic Ointment 1 Application(s) Both EYES two times a day  polyethylene glycol 3350 17 Gram(s) Oral two times a day  senna 2 Tablet(s) Oral at bedtime  tamsulosin 0.8 milliGRAM(s) Oral at bedtime    MEDICATIONS  (PRN):  acetaminophen   Oral Liquid .. 650 milliGRAM(s) Oral every 6 hours PRN Temp greater or equal to 38C (100.4F)  bisacodyl Suppository 10 milliGRAM(s) Rectal daily PRN Constipation  oxyCODONE    IR 5 milliGRAM(s) Oral every 4 hours PRN Mild Pain (1 - 3)      ICU Vital Signs Last 24 Hrs  T(C): 37.2 (20 Oct 2024 04:59), Max: 37.2 (20 Oct 2024 00:44)  T(F): 99 (20 Oct 2024 04:59), Max: 99 (20 Oct 2024 00:44)  HR: 82 (20 Oct 2024 05:07) (76 - 85)  BP: --  BP(mean): --  ABP: 120/67 (20 Oct 2024 05:00) (102/55 - 130/75)  ABP(mean): 85 (20 Oct 2024 05:00) (71 - 93)  RR: 14 (20 Oct 2024 05:00) (13 - 18)  SpO2: 98% (20 Oct 2024 05:07) (96% - 100%)    O2 Parameters below as of 20 Oct 2024 05:07  Patient On (Oxygen Delivery Method): ventilator    Mode: AC/ CMV (Assist Control/ Continuous Mandatory Ventilation)  RR (machine): 12  TV (machine): 430  FiO2: 40  PEEP: 8  ITime: 1.2  MAP: 11  PIP: 20    Physical Exam   GA: no apparent distress  HEENT right eye conjunctival/scleral injection, neck supple, tracheostomy in place   RESP: No respiratory distress, CTA b/l, no WRR  CV: RRR, +S1S2  GI: Soft, NT, ND  Extr: non edematous, peripheral pulses present symmetric   NEURO:   Mental Status: awake/eyes open, maintaining eyes opening spontaneously intermittent downward gaze, not convincingly following commands (attempted to ask to look downward or wiggle fingers in [place]zeri)  CNs: Disconjugated gaze, left eye some abduction and adduction movements noted, spontaneous vs. purposeful downward intermittent eye movements, pupils equally round and reactive to light, corneal + L >> R,  cough ++, breathing over vent  Motor: head/neck movement to noxious, extensor posturing to noxious at the bilateral upper extremities, triple flexion at the bilateral lower extremities     ASSESSMENT/PLAN  43 y/o PMH ?stroke/MI present to [facility] after collapsing at work. Decorticate posturing, vomiting, intubated for airway protection. 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 Transferred to [facility] for further management. s/p trach 10/14. Course complicated by persistent encephalopathy/wakeful unresponsiveness, respiratory failure, VAP.     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   Data   - CTA head and neck 09/30: Re-demonstration of pontine hemorrhage with associated edema and mass effect upon the fourth ventricle and cerebral aqueduct. No underlying no malformation is identified. No increase in ventricular size since the   prior study.  - CT head 10/03: Again noted is a parenchymal hematoma in the nabil, with associated  expansion and surrounding vasogenic edema. There is mild spillage into the subarachnoid spaces at the cistern and within sulci in the right frontal lobe. All of these findings appear stable, without interval rebleeding. There is a chronic hemorrhagic infarct in the left external capsule/corona radiata. There is a small chronic white matter infarct vs small vessel disease in the right corona radiata. Moderate generalized cerebral volume loss, with distention of the sulci and concomitant ex-vacuo ventricular dilatation. Mild nonspecific low attenuation in the periventricular and subcortical white matter. No midline shift or herniation. No CT evidence of acute territorial infarction, although MRI with DWI would be more sensitive. Limited views of the sinuses and mastoids show mild mucosal thickening without air-fluid levels, likely chronic. An ETT is noted in place. Limited views of the orbits and visualized soft tissues of the neck, face, scalp, skull base, and calvarium are otherwise unremarkable. IMPRESSION: No significant change, without interval rebleeding.  - MRI brain 10/12: Similar-appearing evolving acute parenchymal hemorrhage within the nabil with surrounding mass effect and edema. No underlying enhancement. No new areas of acute intracranial hemorrhage. Wedge-shaped acute/subacute infarct within the right lateral cerebellar hemisphere. Unchanged encephalomalacia and gliosis within the left external capsule related to remote hemorrhage in this location.  - vEEG 10/17: with predominant delta slowing with superimposed geo and beta/some reactivity present     Plan  BP goal 100-150, MAP > 65  Sinemet and Amantadine to promote awakening   STAT CT head for any acute neurological change   Off sedaton  PRN fentanyl for vent synchrony/ discomfort   Plan for long term vent rehab facility     CV   Diagnosis: Uncontrolled hypertension at presentation; now episodes of hypotension  Data  - ECHO 9/30 hyperdynamic left ventricular function EF 70%  Plan  SBP goal 100-150, MAP > 65  Labetalol ---> decrease to 200 q8  Hydralazine ---> decrease to 25q8  Continue amlodipine 10 mg daily   Telemetry monitoring    PULM   Diagnosis:  Intubated for airway protection in the setting of acute encephalopathy; persistent respiratory failure; S/P tracheostomy; VAP   Data  Chest x-ray 10/17: Similar to prior exam 10/16/2024 with right lung base partial atelectasis. Nasogastric tube tip below hemidiaphragm. No pneumothorax. No acute infiltrate appearing. Possible minimal right pleural effusion. No left pleural effusion.  ABG - ( 17 Oct 2024 14:40 )  pH, Arterial: 7.37  pH, Blood: x     /  pCO2: 34    /  pO2: 117   / HCO3: 20    / Base Excess: -4.8  /  SaO2: 98.9    Plan   Currently on full vent support ---> PS as tolerated  Pulm toilet/chest PT/Nebs  pulse-oxymetry monitoring   Antibiotics for VAP    RENAL   Diagnosis: ANIKA on CKD unknown baseline) - - > stable; urinary retention requiring frequent straight cath  Data  BUN/Cr 51/3.40 <-- 47/3.36 <-- 48/3.40     10-20    138  |  106  |  51[H]  ----------------------------<  119[H]  5.4[H]   |  22  |  3.40[H]    Ca    8.8      20 Oct 2024 05:30  Phos  4.8     10-20  Mg     2.3     10-20    I&O's Summary    19 Oct 2024 07:01  -  20 Oct 2024 07:00  --------------------------------------------------------  IN: 1600 mL / OUT: 1635 mL / NET: -35 mL    Plan   Euvolemia/normonatremia goal  KVO  FWF 100q4  Monitor for hyperkalemia/monitor I/O  Failing trial of void/monitor for retention   Tamsulosin 0.8  Doxazosin (monitor BP)  Renal following   Avoid nephrotoxic medications    ID  Diagnosis: Febrile/hypoxia 10/15 c/w Ventilator Associated PNA ++Acitonobacter B. ++MSSA   Data                        9.1    6.29  )-----------( 412      ( 20 Oct 2024 05:30 )             29.4     Blood Cx 10/15: NGTD   Sputum Cx 10/16: Acinetobacter B.   Procalcitonin 10/16: 0.36 <-- 0.13   Plan   - S/P Ancef --> restarted on broad spectrum antibiotics on 10/16 Vancomycin and Zosyin --> given sensitivity narrow to Unasyn (renally dosed) continue for 7 day course for VAP  Follow-up cultures results  monitor WBC and fever curve   culture if spikes 38.3C    GI  Diagnosis: Dysphagia secondary to encephalopathy/hemorrhagic stroke, constipation   Data  Last bowel movement:   Plan   NGT ----> PEG planning (10/23 with surgery)  Diet: enteral feeding at goal  Free water flashes 100 q4 (KVO)  GI prophylaxis while intubated   Bowel regimen + suppository    HEME/ONC  Diagnosis: thrombocytosis   Data                        9.1    6.29  )-----------( 412      ( 20 Oct 2024 05:30 )             29.4     LE venous ultrasound 10/19: negative for DVT     Plan   Monitor H&H  Hbg goal > 7.0, PLT > 100  SDCs + heparin SQ for DVT prophylaxis    ENDO  Diagnosis: no active issues   Data  A1c: 5.3  CAPILLARY BLOOD GLUCOSE  POCT Blood Glucose.: 100 mg/dL (19 Oct 2024 17:31)  POCT Blood Glucose.: 101 mg/dL (19 Oct 2024 11:34)  Plan:   Goal euglycemia (-180)    CODE STATUS: FULL CODE  Patient's son (Chauncey Reyes) updated at bedside with . He understands the current clinical condition of his father and would like for him to continue receiving full life sustaining measures.     DISPO: NeuroICU      Jazlyn Tomlin MD   Neurocritical Care Attending                Neurocritical Care Attending Note     HPI: Mr. Reyes is a 43 y/o gentleman, originally from [place], with unknown past medical history (reported stroke and MI by coworkers). He presented to [facility] with AMS. Prior to this, he was working at [employer] when he was found down by coworkers. EMS called and pt brought to [facility] ED. Intubated, sedated, started on cardene for hypertensive emergency SBPs in 200s. CT head showed pontine bleed. Transferred to NSICU for further management.  (30 Sep 2024 12:55).    Hospital Course/Interval Events  9/30: transferred from [facility]. A line placed. Versed dc'd. Cy Rader at bedside, states pt has family in [place], cannot confirm medications or PMH other than stroke and MI. 250cc bolus 3% given. LR switched to NS. hydralazine 25q8 started, 3% started, switched propofol to precedex   10/1: stability CTH done. Added labetalol, started TF. Palliative consulted. ethics consulted to determine surrogate. febrile 103, pan cx sent  10/2: BD 2, GEORGE overnight. TF resumed. Desatt'd to 80s, FiO2 inc. to 50. Fentanyl given, ABG, CXR ordered. Maxxed on precedex, started on propofol for DARIEN -4 - -5. Precedex dc'd. Duonebs, mucomyst, hypertonic added. 3% dc'd. Cardene dc'd. Start vanc/CTX. Increased labetalol 200q8. MRSA negative, dc'd vanc. ETT pulled back 2cm x 2, good positioning after confirmatory chest xray. Ethics attempting to establish HCP with family. Na 159, starting FW 250q6 for range 150-155.   10/3: BD3, GEORGE o/n, neuro stable. Na elevating, FW increased to 300q6. Dc'd bowel reg for diarrhea. vEEG started. SQH 5000q8 tonight.   10/4: BD 4, albumn bolus, incr. LR to 80 2/2 incr. in Cr, LR to 100cc/hr for uptrending Cr. Started 7% hypersal for 48hrs and SL atropine for inline/oral thick secretions. Dc'd CTX and started ancef for MSSA in the sputum. Nephrology consulted for CKD, f/u recs. SBP 170s, given hydralazine 10mg IVP.   10/5: BD5, o/n 10mg IVP hydralazine given for SBP 170s and started on hydralazine 25q8 via OGT. 10mg IV push labetalol for SBP > 160s. RT placed for diarrhea.   10/6: BD6, o/n FW increased to 350q4 per nephrology recs. IV tylenol for temp 100.6, SBp 160s presumed uncomfortable.   10/7: BD7, overnight pancultured for temp 101.8F.   10/8: BD8. GEORGE. Cr bumped. decreased LR to 75cc/hr. Adding simethicone ATC. incr hydralazine 50mgTID. Incr labetalol 300mgTID. Na 145, decreased FWF to 250q6. Start precedex. FENa consistent with intrinsic kidney injury. Pend repeat renal US. Retaining up to 1.3L, bladder scans q6, straight cath PRN  10/9: BD 9. GEORGE overnight. Neuro stable. abd xray for distention w non-specific gas pattern, OGT to LIWS for morning. duonebs/mucomyst to q8 for improving secretions. Changed tube feeds to Jevity 1.5 20cc/hr, low rate due to abdominal distention, nepro dense and more difficult to digest. Tolerating CPAP, confirmed by ABG.   10/10: BD 10. GEORGE overnight. Neuro stable. (+) gabriel for urinary retention on bladder scan. inc TF to goal rate of 40cc/hr. family leaning toward pursuing trach/PEG. 1/2 amp for FS 81.   10/11: BD 11. GEORGE overnight. Neuro stable. Trach/PEG consults placed.   10/12: BD 12. GEORGE overnight. Neuro stable. MRI brain complete.   10/13: BD 13. Increase flomax. Hold SQH after PM dose for trach tm. IVL.   10/14: BD 14. GEORGE overnight, remains on AC/VC. Gabriel placed for urinary retention. Dc'd free water.  S/p trach with pulm. NGT placed and CXR confirmed in good position.   10/15: BD 15, GEORGE ovn. resumed feeds. spiked 101, pan cx sent.   10/16: BD 16. GEORGE ovn. Lokelma 5mg for K+ 5.4. Started vanc q 24/zosyn for empiric PNA coverage, IVF to 100/hr. PEG held for fever.   10/17: Na low, ordered serum osm and urine osm for am. Started sinemet for neurostimulation. Increased cardura to 0.8. Started FW 100q4, dc'd IVF.   10/18: BD 18, GEORGE overnight, neuro stable. Amantadine added for neurostim.   10/19: BD 19, GEORGE ovn. cardura 2mg added for retention. labetalol decreased 200q8, hydralazine decreased 25q8. Gabriel replaced.   10/20: BD20, GEORGE overnight. NGT dislodged, replaced.     MEDICATIONS  (STANDING):  acetylcysteine 10%  Inhalation 4 milliLiter(s) Inhalation every 12 hours  albuterol/ipratropium for Nebulization 3 milliLiter(s) Nebulizer every 12 hours  amantadine 100 milliGRAM(s) Oral <User Schedule>  amLODIPine   Tablet 10 milliGRAM(s) Oral daily  ampicillin/sulbactam  IVPB 3 Gram(s) IV Intermittent <User Schedule>  carbidopa/levodopa  25/100 1 Tablet(s) Oral <User Schedule>  chlorhexidine 2% Cloths 1 Application(s) Topical <User Schedule>  doxazosin 2 milliGRAM(s) Oral at bedtime  erythromycin   Ointment 1 Application(s) Right EYE every 6 hours  heparin   Injectable 5000 Unit(s) SubCutaneous every 8 hours  hydrALAZINE 25 milliGRAM(s) Oral every 8 hours  labetalol 200 milliGRAM(s) Oral every 8 hours  pantoprazole  Injectable 40 milliGRAM(s) IV Push daily  petrolatum Ophthalmic Ointment 1 Application(s) Both EYES two times a day  polyethylene glycol 3350 17 Gram(s) Oral two times a day  senna 2 Tablet(s) Oral at bedtime  tamsulosin 0.8 milliGRAM(s) Oral at bedtime    MEDICATIONS  (PRN):  acetaminophen   Oral Liquid .. 650 milliGRAM(s) Oral every 6 hours PRN Temp greater or equal to 38C (100.4F)  bisacodyl Suppository 10 milliGRAM(s) Rectal daily PRN Constipation  oxyCODONE    IR 5 milliGRAM(s) Oral every 4 hours PRN Mild Pain (1 - 3)      ICU Vital Signs Last 24 Hrs  T(C): 37.2 (20 Oct 2024 04:59), Max: 37.2 (20 Oct 2024 00:44)  T(F): 99 (20 Oct 2024 04:59), Max: 99 (20 Oct 2024 00:44)  HR: 82 (20 Oct 2024 05:07) (76 - 85)  BP: --  BP(mean): --  ABP: 120/67 (20 Oct 2024 05:00) (102/55 - 130/75)  ABP(mean): 85 (20 Oct 2024 05:00) (71 - 93)  RR: 14 (20 Oct 2024 05:00) (13 - 18)  SpO2: 98% (20 Oct 2024 05:07) (96% - 100%)    O2 Parameters below as of 20 Oct 2024 05:07  Patient On (Oxygen Delivery Method): ventilator    Mode: AC/ CMV (Assist Control/ Continuous Mandatory Ventilation)  RR (machine): 12  TV (machine): 430  FiO2: 40  PEEP: 8  ITime: 1.2  MAP: 11  PIP: 20    Physical Exam   GA: no apparent distress  HEENT right eye conjunctival/scleral injection, neck supple, tracheostomy in place   RESP: No respiratory distress, CTA b/l, no WRR  CV: RRR, +S1S2  GI: Soft, NT, ND  Extr: non edematous, peripheral pulses present symmetric   NEURO:   Mental Status: awake/eyes open, maintaining eyes opening spontaneously intermittent downward gaze, not convincingly following commands (attempted to ask to look downward or wiggle fingers in French)  CNs: Disconjugated gaze, left eye some abduction and adduction movements noted, spontaneous vs. purposeful downward intermittent eye movements, pupils equally round and reactive to light, corneal + L >> R,  cough ++, breathing over vent  Motor: head/neck movement to noxious, extensor posturing to noxious at the bilateral upper extremities, triple flexion at the bilateral lower extremities     ASSESSMENT/PLAN  43 y/o PMH ?stroke/MI present to [facility] after collapsing at work. Decorticate posturing, vomiting, intubated for airway protection. 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 Transferred to [facility] for further management. s/p trach 10/14. Course complicated by persistent encephalopathy/wakeful unresponsiveness, respiratory failure, VAP.     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   Data   - CTA head and neck 09/30: Re-demonstration of pontine hemorrhage with associated edema and mass effect upon the fourth ventricle and cerebral aqueduct. No underlying no malformation is identified. No increase in ventricular size since the   prior study.  - CT head 10/03: Again noted is a parenchymal hematoma in the nabil, with associated  expansion and surrounding vasogenic edema. There is mild spillage into the subarachnoid spaces at the cistern and within sulci in the right frontal lobe. All of these findings appear stable, without interval rebleeding. There is a chronic hemorrhagic infarct in the left external capsule/corona radiata. There is a small chronic white matter infarct vs small vessel disease in the right corona radiata. Moderate generalized cerebral volume loss, with distention of the sulci and concomitant ex-vacuo ventricular dilatation. Mild nonspecific low attenuation in the periventricular and subcortical white matter. No midline shift or herniation. No CT evidence of acute territorial infarction, although MRI with DWI would be more sensitive. Limited views of the sinuses and mastoids show mild mucosal thickening without air-fluid levels, likely chronic. An ETT is noted in place. Limited views of the orbits and visualized soft tissues of the neck, face, scalp, skull base, and calvarium are otherwise unremarkable. IMPRESSION: No significant change, without interval rebleeding.  - MRI brain 10/12: Similar-appearing evolving acute parenchymal hemorrhage within the nabil with surrounding mass effect and edema. No underlying enhancement. No new areas of acute intracranial hemorrhage. Wedge-shaped acute/subacute infarct within the right lateral cerebellar hemisphere. Unchanged encephalomalacia and gliosis within the left external capsule related to remote hemorrhage in this location.  - vEEG 10/17: with predominant delta slowing with superimposed geo and beta/some reactivity present     Plan  Neuro check q4 and vitals q1  BP goal 100-150, MAP > 65  Sinemet and Amantadine to promote awakening   STAT CT head for any acute neurological change   Off sedation  PRN fentanyl for vent synchrony/ discomfort   Plan for long term vent rehab facility     CV   Diagnosis: Uncontrolled hypertension at presentation; now episodes of hypotension  Data  - ECHO 9/30 hyperdynamic left ventricular function EF 70%  Plan  SBP goal 100-150, MAP > 65  Labetalol ---> decrease to 100 q8  Continue Hydralazine  25q8  Continue amlodipine 10 mg daily   Telemetry monitoring    PULM   Diagnosis: Intubated for airway protection in the setting of acute encephalopathy; persistent respiratory failure; S/P tracheostomy; VAP   Data  Chest x-ray 10/17: Similar to prior exam 10/16/2024 with right lung base partial atelectasis. Nasogastric tube tip below hemidiaphragm. No pneumothorax. No acute infiltrate appearing. Possible minimal right pleural effusion. No left pleural effusion.  ABG - ( 17 Oct 2024 14:40 )  pH, Arterial: 7.37  pH, Blood: x     /  pCO2: 34    /  pO2: 117   / HCO3: 20    / Base Excess: -4.8  /  SaO2: 98.9    Plan   Currently on full vent support ---> PS as tolerated  Pulm toilet/chest PT q4/Nebs  ABG and chest x-ray   pulse-oxymetry monitoring   Antibiotics for VAP    RENAL   Diagnosis: ANIKA on CKD unknown baseline) - - > stable; urinary retention requiring frequent straight cath  Data  BUN/Cr 51/3.40 <-- 47/3.36 <-- 48/3.40     10-20    138  |  106  |  51[H]  ----------------------------<  119[H]  5.4[H]   |  22  |  3.40[H]    Ca    8.8      20 Oct 2024 05:30  Phos  4.8     10-20  Mg     2.3     10-20    I&O's Summary    19 Oct 2024 07:01  -  20 Oct 2024 07:00  --------------------------------------------------------  IN: 1600 mL / OUT: 1635 mL / NET: -35 mL    Plan   Euvolemia/normonatremia goal  KVO  FWF 150q4  Monitor for hyperkalemia/monitor I/O  Failing trial of void/monitor for retention   Tamsulosin 0.8  Doxazosin (monitor BP)  Renal following   Avoid nephrotoxic medications    ID  Diagnosis: Febrile/hypoxia 10/15 c/w Ventilator Associated PNA ++Acitonobacter B. ++MSSA   Data                        9.1    6.29  )-----------( 412      ( 20 Oct 2024 05:30 )             29.4     Blood Cx 10/15: NGTD   Sputum Cx 10/16: Acinetobacter B.   Procalcitonin 10/16: 0.36 <-- 0.13   Plan   - S/P Ancef --> restarted on broad spectrum antibiotics on 10/16 Vancomycin and Zosyn --> given sensitivity narrow to Unasyn (renally dosed) continue for 7 day course for VAP  Follow-up cultures results  monitor WBC and fever curve   culture if spikes 38.3C    GI  Diagnosis: Dysphagia secondary to encephalopathy/hemorrhagic stroke, constipation   Data  Last bowel movement: 10/17  Plan   NGT ----> PEG planning (10/21 with Surgery)  Diet: enteral feeding at goal  Free water flashes 150 q4 (KVO)  GI prophylaxis while intubated   Bowel regimen + suppository     HEME/ONC  Diagnosis: thrombocytosis   Data                        9.1    6.29  )-----------( 412      ( 20 Oct 2024 05:30 )             29.4     LE venous ultrasound 10/19: negative for DVT     Plan   Monitor H&H  Hbg goal > 7.0, PLT > 100  SDCs + heparin SQ for DVT prophylaxis    ENDO  Diagnosis: no active issues   Data  A1c: 5.3  CAPILLARY BLOOD GLUCOSE  POCT Blood Glucose.: 100 mg/dL (19 Oct 2024 17:31)  POCT Blood Glucose.: 101 mg/dL (19 Oct 2024 11:34)  Plan:   Goal euglycemia (-180)    CODE STATUS: FULL CODE  Patient's son (Chauncey Reyes) updated at bedside with . He understands the current clinical condition of his father and would like for him to continue receiving full life sustaining measures.     DISPO: NeuroICU      Jazlyn Tomlin MD   Neurocritical Care Attending                Neurocritical Care Attending Note     HPI: Mr. Reyes is a 43 y/o gentleman, originally from [place], with unknown past medical history (reported stroke and MI by coworkers). He presented to [facility] with AMS. Prior to this, he was working at [employer] when he was found down by coworkers. EMS called and pt brought to [facility] ED. Intubated, sedated, started on cardene for hypertensive emergency SBPs in 200s. CT head showed pontine bleed. Transferred to NSICU for further management.  (30 Sep 2024 12:55).    Hospital Course/Interval Events  9/30: transferred from [facility]. A line placed. Versed dc'd. Cy Rader at bedside, states pt has family in [place], cannot confirm medications or PMH other than stroke and MI. 250cc bolus 3% given. LR switched to NS. hydralazine 25q8 started, 3% started, switched propofol to precedex   10/1: stability CTH done. Added labetalol, started TF. Palliative consulted. ethics consulted to determine surrogate. febrile 103, pan cx sent  10/2: BD 2, GEORGE overnight. TF resumed. Desatt'd to 80s, FiO2 inc. to 50. Fentanyl given, ABG, CXR ordered. Maxxed on precedex, started on propofol for DARIEN -4 - -5. Precedex dc'd. Duonebs, mucomyst, hypertonic added. 3% dc'd. Cardene dc'd. Start vanc/CTX. Increased labetalol 200q8. MRSA negative, dc'd vanc. ETT pulled back 2cm x 2, good positioning after confirmatory chest xray. Ethics attempting to establish HCP with family. Na 159, starting FW 250q6 for range 150-155.   10/3: BD3, GEORGE o/n, neuro stable. Na elevating, FW increased to 300q6. Dc'd bowel reg for diarrhea. vEEG started. SQH 5000q8 tonight.   10/4: BD 4, albumn bolus, incr. LR to 80 2/2 incr. in Cr, LR to 100cc/hr for uptrending Cr. Started 7% hypersal for 48hrs and SL atropine for inline/oral thick secretions. Dc'd CTX and started ancef for MSSA in the sputum. Nephrology consulted for CKD, f/u recs. SBP 170s, given hydralazine 10mg IVP.   10/5: BD5, o/n 10mg IVP hydralazine given for SBP 170s and started on hydralazine 25q8 via OGT. 10mg IV push labetalol for SBP > 160s. RT placed for diarrhea.   10/6: BD6, o/n FW increased to 350q4 per nephrology recs. IV tylenol for temp 100.6, SBp 160s presumed uncomfortable.   10/7: BD7, overnight pancultured for temp 101.8F.   10/8: BD8. GEORGE. Cr bumped. decreased LR to 75cc/hr. Adding simethicone ATC. incr hydralazine 50mgTID. Incr labetalol 300mgTID. Na 145, decreased FWF to 250q6. Start precedex. FENa consistent with intrinsic kidney injury. Pend repeat renal US. Retaining up to 1.3L, bladder scans q6, straight cath PRN  10/9: BD 9. GEORGE overnight. Neuro stable. abd xray for distention w non-specific gas pattern, OGT to LIWS for morning. duonebs/mucomyst to q8 for improving secretions. Changed tube feeds to Jevity 1.5 20cc/hr, low rate due to abdominal distention, nepro dense and more difficult to digest. Tolerating CPAP, confirmed by ABG.   10/10: BD 10. GEORGE overnight. Neuro stable. (+) gabriel for urinary retention on bladder scan. inc TF to goal rate of 40cc/hr. family leaning toward pursuing trach/PEG. 1/2 amp for FS 81.   10/11: BD 11. GEORGE overnight. Neuro stable. Trach/PEG consults placed.   10/12: BD 12. GEORGE overnight. Neuro stable. MRI brain complete.   10/13: BD 13. Increase flomax. Hold SQH after PM dose for trach tm. IVL.   10/14: BD 14. GEORGE overnight, remains on AC/VC. Gabriel placed for urinary retention. Dc'd free water.  S/p trach with pulm. NGT placed and CXR confirmed in good position.   10/15: BD 15, GEORGE ovn. resumed feeds. spiked 101, pan cx sent.   10/16: BD 16. GEORGE ovn. Lokelma 5mg for K+ 5.4. Started vanc q 24/zosyn for empiric PNA coverage, IVF to 100/hr. PEG held for fever.   10/17: Na low, ordered serum osm and urine osm for am. Started sinemet for neurostimulation. Increased cardura to 0.8. Started FW 100q4, dc'd IVF.   10/18: BD 18, GEORGE overnight, neuro stable. Amantadine added for neurostim.   10/19: BD 19, GEORGE ovn. cardura 2mg added for retention. labetalol decreased 200q8, hydralazine decreased 25q8. Gabriel replaced.   10/20: BD20, GEORGE overnight. NGT dislodged, replaced.     MEDICATIONS  (STANDING):  acetylcysteine 10%  Inhalation 4 milliLiter(s) Inhalation every 12 hours  albuterol/ipratropium for Nebulization 3 milliLiter(s) Nebulizer every 12 hours  amantadine 100 milliGRAM(s) Oral <User Schedule>  amLODIPine   Tablet 10 milliGRAM(s) Oral daily  ampicillin/sulbactam  IVPB 3 Gram(s) IV Intermittent <User Schedule>  carbidopa/levodopa  25/100 1 Tablet(s) Oral <User Schedule>  chlorhexidine 2% Cloths 1 Application(s) Topical <User Schedule>  doxazosin 2 milliGRAM(s) Oral at bedtime  erythromycin   Ointment 1 Application(s) Right EYE every 6 hours  heparin   Injectable 5000 Unit(s) SubCutaneous every 8 hours  hydrALAZINE 25 milliGRAM(s) Oral every 8 hours  labetalol 200 milliGRAM(s) Oral every 8 hours  pantoprazole  Injectable 40 milliGRAM(s) IV Push daily  petrolatum Ophthalmic Ointment 1 Application(s) Both EYES two times a day  polyethylene glycol 3350 17 Gram(s) Oral two times a day  senna 2 Tablet(s) Oral at bedtime  tamsulosin 0.8 milliGRAM(s) Oral at bedtime    MEDICATIONS  (PRN):  acetaminophen   Oral Liquid .. 650 milliGRAM(s) Oral every 6 hours PRN Temp greater or equal to 38C (100.4F)  bisacodyl Suppository 10 milliGRAM(s) Rectal daily PRN Constipation  oxyCODONE    IR 5 milliGRAM(s) Oral every 4 hours PRN Mild Pain (1 - 3)      ICU Vital Signs Last 24 Hrs  T(C): 37.2 (20 Oct 2024 04:59), Max: 37.2 (20 Oct 2024 00:44)  T(F): 99 (20 Oct 2024 04:59), Max: 99 (20 Oct 2024 00:44)  HR: 82 (20 Oct 2024 05:07) (76 - 85)  BP: --  BP(mean): --  ABP: 120/67 (20 Oct 2024 05:00) (102/55 - 130/75)  ABP(mean): 85 (20 Oct 2024 05:00) (71 - 93)  RR: 14 (20 Oct 2024 05:00) (13 - 18)  SpO2: 98% (20 Oct 2024 05:07) (96% - 100%)    O2 Parameters below as of 20 Oct 2024 05:07  Patient On (Oxygen Delivery Method): ventilator    Mode: AC/ CMV (Assist Control/ Continuous Mandatory Ventilation)  RR (machine): 12  TV (machine): 430  FiO2: 40  PEEP: 8  ITime: 1.2  MAP: 11  PIP: 20    Physical Exam   GA: no apparent distress  HEENT right eye conjunctival/scleral injection, neck supple, tracheostomy in place   RESP: No respiratory distress, CTA b/l, no WRR  CV: RRR, +S1S2  GI: Soft, NT, ND  Extr: non edematous, peripheral pulses present symmetric   NEURO:   Mental Status: awake/eyes open, maintaining eyes opening spontaneously intermittent downward gaze/ocular bobbing, not convincingly following commands (attempted to ask to look downward or wiggle fingers in Sami)  CNs: Disconjugated gaze, left eye some abduction and adduction movements noted, spontaneous vs. purposeful downward intermittent eye movement, pupils equally round and reactive to light right larger than left, corneal + L, trace on right,  cough ++, breathing over vent  Motor: head/neck movement towards noxious, extensor posturing to noxious at the bilateral upper extremities, triple flexion at the bilateral lower extremities     ASSESSMENT/PLAN  43 y/o PMH ?stroke/MI present to [facility] after collapsing at work. Decorticate posturing, vomiting, intubated for airway protection. 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 Transferred to [facility] for further management. s/p trach 10/14. Course complicated by persistent encephalopathy/wakeful unresponsiveness, respiratory failure, VAP.     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   Data   - CTA head and neck 09/30: Re-demonstration of pontine hemorrhage with associated edema and mass effect upon the fourth ventricle and cerebral aqueduct. No underlying no malformation is identified. No increase in ventricular size since the   prior study.  - CT head 10/03: Again noted is a parenchymal hematoma in the nabil, with associated  expansion and surrounding vasogenic edema. There is mild spillage into the subarachnoid spaces at the cistern and within sulci in the right frontal lobe. All of these findings appear stable, without interval rebleeding. There is a chronic hemorrhagic infarct in the left external capsule/corona radiata. There is a small chronic white matter infarct vs small vessel disease in the right corona radiata. Moderate generalized cerebral volume loss, with distention of the sulci and concomitant ex-vacuo ventricular dilatation. Mild nonspecific low attenuation in the periventricular and subcortical white matter. No midline shift or herniation. No CT evidence of acute territorial infarction, although MRI with DWI would be more sensitive. Limited views of the sinuses and mastoids show mild mucosal thickening without air-fluid levels, likely chronic. An ETT is noted in place. Limited views of the orbits and visualized soft tissues of the neck, face, scalp, skull base, and calvarium are otherwise unremarkable. IMPRESSION: No significant change, without interval rebleeding.  - MRI brain 10/12: Similar-appearing evolving acute parenchymal hemorrhage within the nabil with surrounding mass effect and edema. No underlying enhancement. No new areas of acute intracranial hemorrhage. Wedge-shaped acute/subacute infarct within the right lateral cerebellar hemisphere. Unchanged encephalomalacia and gliosis within the left external capsule related to remote hemorrhage in this location.  - vEEG 10/17: with predominant delta slowing with superimposed geo and beta/some reactivity present     Plan  Neuro check q4 and vitals q1  BP goal 100-150, MAP > 65  Sinemet and Amantadine to promote awakening   STAT CT head for any acute neurological change   Off sedation  PRN fentanyl for vent synchrony/ discomfort   Plan for long term vent rehab facility     CV   Diagnosis: Uncontrolled hypertension at presentation; now episodes of hypotension  Data  HR: 85 (20 Oct 2024 10:00) (76 - 86)  ABP: 132/70 (20 Oct 2024 10:00) (106/57 - 132/70)  ABP(mean): 93 (20 Oct 2024 10:00) (74 - 93)  ECHO 9/30 hyperdynamic left ventricular function EF 70%  Plan  SBP goal 100-150, MAP > 65  Labetalol ---> decrease to 100 q8  Continue Hydralazine  25q8  Continue amlodipine 10 mg daily   Telemetry monitoring    PULM   Diagnosis: Intubated for airway protection in the setting of acute encephalopathy; persistent respiratory failure; S/P tracheostomy; VAP, not tolerating PS due to apnea   Data  Chest x-ray 10/17: Similar to prior exam 10/16/2024 with right lung base partial atelectasis. Nasogastric tube tip below hemidiaphragm. No pneumothorax. No acute infiltrate appearing. Possible minimal right pleural effusion. No left pleural effusion.  ABG - ( 17 Oct 2024 14:40 )  pH, Arterial: 7.37  pH, Blood: x     /  pCO2: 34    /  pO2: 117   / HCO3: 20    / Base Excess: -4.8  /  SaO2: 98.9    Plan   Currently on full vent support ---> PS as tolerated  Pulm toilet/chest PT q4/Nebs  ABG after PS trial  chest x-ray today  pulse-oxymetry monitoring   Antibiotics for VAP    RENAL   Diagnosis: ANIKA on CKD unknown baseline) - - > stable; urinary retention requiring frequent straight cath required Gabriel replacement; intermittent hyperkalemia   Data  BUN/Cr 51/3.40 <-- 47/3.36 <-- 48/3.40     10-20    138  |  106  |  51[H]  ----------------------------<  119[H]  5.4[H]   |  22  |  3.40[H]    Ca    8.8      20 Oct 2024 05:30  Phos  4.8     10-20  Mg     2.3     10-20    I&O's Summary    19 Oct 2024 07:01  -  20 Oct 2024 07:00  --------------------------------------------------------  IN: 1600 mL / OUT: 1635 mL / NET: -35 mL    Plan   Euvolemia/normonatremia goal  KVO  Increase FWF 150q4  Monitor for hyperkalemia/monitor I/O  Gabriel ---> given urinary retention  Tamsulosin 0.8  Doxazosin (monitor BP)  Renal following   Avoid nephrotoxic medications    ID  Diagnosis: Febrile, normal white count/ Fever and hypoxia on 10/15 c/w Ventilator Associated PNA ++Acitonobacter B. ++MSSA   Data  T(C): 36.9 (20 Oct 2024 09:16), Max: 37.2 (20 Oct 2024 00:44)             9.1    6.29  )-----------( 412      ( 20 Oct 2024 05:30 )             29.4     Blood Cx 10/15: NGTD   Sputum Cx 10/16: Acinetobacter B.   Procalcitonin 10/16: 0.36 <-- 0.13   Plan   - S/P Ancef --> restarted on broad spectrum antibiotics on 10/16 Vancomycin and Zosyn --> given sensitivity narrow to Unasyn (renally dosed) continue for 7-day course (end day 10/23) for VAP  Follow-up cultures results  monitor WBC and fever curve   culture if spikes 38.3C    GI  Diagnosis: Dysphagia secondary to encephalopathy/hemorrhagic stroke pending PEG placement, constipation   Data  Last bowel movement: 10/17  Plan   NGT ----> PEG planning (10/21 with Surgery)  Diet: enteral feeding at goal  Free water flashes 150 q4 (KVO)  GI prophylaxis while intubated   Bowel regimen + suppository     HEME/ONC  Diagnosis: thrombocytosis   Data                        9.1    6.29  )-----------( 412      ( 20 Oct 2024 05:30 )             29.4     LE venous ultrasound 10/19: negative for DVT     Plan   Monitor H&H  Hbg goal > 7.0, PLT > 100  SDCs + heparin SQ for DVT prophylaxis    ENDO  Diagnosis: no active issues   Data  A1c: 5.3  Glucose: 119 mg/dL (10.20.24 @ 05:30)   Glucose: 114 mg/dL (10.19.24 @ 05:30)   Glucose: 124 mg/dL (10.18.24 @ 05:28)   Glucose: 134 mg/dL (10.17.24 @ 05:30)     Plan:   Goal euglycemia (-180)  Avoid hypoglycemic episodes        CODE STATUS: FULL CODE  Patient's son (Chauncey Reyes) updated at bedside with . He understands the current clinical condition of his father and would like for him to continue receiving full life sustaining measures.     DISPO: NeuroICU  Plan for long term vent facility      Jazlyn Tomlin MD   Neurocritical Care Attending

## 2024-10-20 NOTE — PROGRESS NOTE ADULT - ASSESSMENT
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 Patient does not have a prior hx of abdominal surgeries, is not currently on any anticoagulation and is only on SQH. Patients son had a long discussion with palliative care regarding goals of care, is understanding the chance of full reocvery is poor, patient's son (SDM) would like to proceed with any measures to prolong his life which includes PEG and trach.       PLAN:  1. Plan to place PEG at bedside tomorrow 10/21 with Dr. Trinh  2. NPO at midnight  3. Hold tube feeds at midnight

## 2024-10-20 NOTE — DISCHARGE NOTE PROVIDER - CARE PROVIDERS DIRECT ADDRESSES
,[email or web address],DirectAddress_Unknown

## 2024-10-20 NOTE — DISCHARGE NOTE PROVIDER - INSTRUCTIONS
Follow the recommendations given by your nutrition team.  Methotrexate Counseling:  Patient counseled regarding adverse effects of methotrexate including but not limited to nausea, vomiting, abnormalities in liver function tests. Patients may develop mouth sores, rash, diarrhea, and abnormalities in blood counts. The patient understands that monitoring is required including LFT's and blood counts.  There is a rare possibility of scarring of the liver and lung problems that can occur when taking methotrexate. Persistent nausea, loss of appetite, pale stools, dark urine, cough, and shortness of breath should be reported immediately. Patient advised to discontinue methotrexate treatment at least three months before attempting to become pregnant.  I discussed the need for folate supplements while taking methotrexate.  These supplements can decrease side effects during methotrexate treatment. The patient verbalized understanding of the proper use and possible adverse effects of methotrexate.  All of the patient's questions and concerns were addressed. Maribeth condon renal 59mL per hour between 11AM and 5AM (hold between 5AM and 11AM) Maribeth [facility] renal 59mL per hour between 11AM and 5AM (hold between 5AM and 11AM)  Free water 200mL every 6 hours

## 2024-10-20 NOTE — DISCHARGE NOTE PROVIDER - NSDCFUADDAPPT_GEN_ALL_CORE_FT
Please follow up with Dr. D'Amico. Call the office for your appointment time and date.     Please follow up with Dr. ___, pulmonology, for continued care for your tracheostomy.    Please follow up with Dr. ___, general surgery, for continued care of your PEG.    Please follow up with Dr. ___, nephrology, for your chronic renal disease.     Follow up with your primary care provider.  Please follow up with Dr. D'Amico. Call the office for your appointment time and date.     Please follow up with Dr. Recinos from pulmonology, for continued care for your tracheostomy.    Please follow up with Dr. Trinh from general surgery for continued care of your PEG.    Please follow up with  ___, nephrology, for your chronic renal disease.     Epilepsy    Follow up with your primary care provider.  Please follow up with Dr. D'Amico. Call the office for your appointment time and date.     Please follow up with Dr. Recinos or Dr. Smith from pulmonology, for continued care for your tracheostomy.    Please follow up with Dr. Trinh from general surgery for continued care of your PEG.    Please follow up with Nephrology for your chronic renal disease    Please follow up with Urology for gabriel catheter management/urinary retention     Please follow up with Dr. Farley from Epilepsy    Please follow up with Ophthalmology     Follow up with your primary care provider.

## 2024-10-20 NOTE — DISCHARGE NOTE PROVIDER - NPI NUMBER (FOR SYSADMIN USE ONLY) :
[[id number]] Statement Selected [[id number]],[[id number]],[[id number]] [[id number]],[[id number]],[[id number]],[[id number]]

## 2024-10-20 NOTE — DISCHARGE NOTE PROVIDER - NSDCCPTREATMENT_GEN_ALL_CORE_FT
PRINCIPAL PROCEDURE  Procedure: Percutaneous tracheal puncture  Findings and Treatment:       SECONDARY PROCEDURE  Procedure: EGD, with PEG  Findings and Treatment:

## 2024-10-20 NOTE — DISCHARGE NOTE PROVIDER - NSDCMRMEDTOKEN_GEN_ALL_CORE_FT
acetaminophen 160 mg/5 mL oral suspension: 20.31 milliliter(s) orally every 6 hours as needed for Temp greater or equal to 38C (100.4F), Mild Pain (1 - 3)   acetaminophen 160 mg/5 mL oral suspension: 20.31 milliliter(s) by gastrostomy tube every 6 hours as needed for Temp greater or equal to 38C (100.4F), Mild Pain (1 - 3) 650mg every 6 hours as needed  bisacodyl 10 mg rectal suppository: 1 suppository(ies) rectal once a day  chlorhexidine 0.12% mucous membrane liquid: 15 milliliter(s) mucous membrane every 12 hours  doxazosin 4 mg oral tablet: 1 tab(s) by gastrostomy tube once a day (at bedtime)  erythromycin 0.5% ophthalmic ointment: 1 application to each affected eye every 4 hours  gabapentin 300 mg oral capsule: 1 cap(s) by gastrostomy tube every 12 hours  heparin: 5,000 unit(s) subcutaneous every 8 hours  ipratropium-albuterol 0.5 mg-2.5 mg/3 mL inhalation solution: 3 milliliter(s) inhaled every 4 hours  lacosamide 200 mg oral tablet: 1 tab(s) by gastrostomy tube every 12 hours  levETIRAcetam 750 mg oral tablet: 2 tab(s) by gastrostomy tube every 12 hours  metoprolol: 12.5 milligram(s) by gastrostomy tube every 12 hours  ocular lubricant ophthalmic ointment: 1 application to each affected eye every 4 hours  ocular lubricant preservative-free ophthalmic solution: 1 drop(s) to each affected eye every 4 hours  pantoprazole 40 mg oral granule, delayed release: 40 milligram(s) by gastrostomy tube once a day  phenytoin 25 mg/mL oral suspension: 8 milliliter(s) orally once a day 200mg once a day at 8PM  phenytoin 25 mg/mL oral suspension: 6 milliliter(s) orally 2 times a day 150mg  twice daily at 4AM and 12PM  polyethylene glycol 3350 oral powder for reconstitution: 17 gram(s) by gastrostomy tube once a day  senna leaf extract oral tablet: 2 tab(s) by gastrostomy tube once a day (at bedtime)

## 2024-10-20 NOTE — DISCHARGE NOTE PROVIDER - NSDCCPCAREPLAN_GEN_ALL_CORE_FT
PRINCIPAL DISCHARGE DIAGNOSIS  Diagnosis: Hemorrhagic stroke  Assessment and Plan of Treatment:      PRINCIPAL DISCHARGE DIAGNOSIS  Diagnosis: Pontine hemorrhage  Assessment and Plan of Treatment:       SECONDARY DISCHARGE DIAGNOSES  Diagnosis: Neurogenic dysphagia  Assessment and Plan of Treatment:     Diagnosis: Chronic respiratory failure  Assessment and Plan of Treatment:     Diagnosis: Encounter for palliative care  Assessment and Plan of Treatment:     Diagnosis: Acute urinary retention  Assessment and Plan of Treatment:     Diagnosis: Functional quadriplegia  Assessment and Plan of Treatment:     Diagnosis: Hypertension  Assessment and Plan of Treatment:     Diagnosis: Seizures  Assessment and Plan of Treatment:      PRINCIPAL DISCHARGE DIAGNOSIS  Diagnosis: Pontine hemorrhage  Assessment and Plan of Treatment:       SECONDARY DISCHARGE DIAGNOSES  Diagnosis: Functional quadriplegia  Assessment and Plan of Treatment:     Diagnosis: Neurogenic dysphagia  Assessment and Plan of Treatment:     Diagnosis: Chronic respiratory failure  Assessment and Plan of Treatment:     Diagnosis: Encounter for palliative care  Assessment and Plan of Treatment:     Diagnosis: Acute urinary retention  Assessment and Plan of Treatment:     Diagnosis: Hypertension  Assessment and Plan of Treatment:     Diagnosis: Seizures  Assessment and Plan of Treatment:      PRINCIPAL DISCHARGE DIAGNOSIS  Diagnosis: Pontine hemorrhage  Assessment and Plan of Treatment: Palliative care and ethics involved in case. There is no plan for any escalation of care that can be decided by medical providers. There is no plan to treat any infections or acute medical conditions as it is deemed medically futile      SECONDARY DISCHARGE DIAGNOSES  Diagnosis: Functional quadriplegia  Assessment and Plan of Treatment:     Diagnosis: Neurogenic dysphagia  Assessment and Plan of Treatment: PEG tube in place for feeding and medications    Diagnosis: Chronic respiratory failure  Assessment and Plan of Treatment: tracheasotomy tube in place    Diagnosis: Encounter for palliative care  Assessment and Plan of Treatment:     Diagnosis: Hypertension  Assessment and Plan of Treatment: continue Metoprolol    Diagnosis: Seizures  Assessment and Plan of Treatment: Continue Gabapentin, lacosamide, Keppra, Dilantin  Gabapentin 300mg every 12 hours  Lacosamide (Vimpat) 200mg every 12 hours  Levetiracetam (Keppra) 1500mg every 12 hours  Phenytoin (Dilantin) 150mg at 4AM and 12PM, 200mg at 8PM  ***Patient has long standing intermittent facial twitching not correlated to seizures on EEG. Intermittent facial twitching is expexted to continue, there is no need to escalate care for facial twitching    Diagnosis: Urinary retention  Assessment and Plan of Treatment: continue Doxazosin  Vuong in place (placed on 3/12)    Diagnosis: Keratitis  Assessment and Plan of Treatment: continue Eryhtromycin ointment, artificial tears, occular lubricant

## 2024-10-20 NOTE — DISCHARGE NOTE PROVIDER - CARE PROVIDER_API CALL
D'Amico, Randy Scott  Neurosurgery  [address] [address]  Phone: [phone]  Fax: [phone]  Follow Up Time:    D'Amico, Randy Scott  Neurosurgery  [address] [address]  Phone: [phone]  Fax: [phone]  Follow Up Time:     Kaylynn Trinh  Surgery  [address], [address]  [place] [address]  Phone: [phone]  Fax: [phone]  Follow Up Time:     Jose Recinos  Pulmonary Disease  [address], [address] [place] [address]  Phone: [phone]  Fax: [phone]  Follow Up Time:    D'Amico, Randy Scott  Neurosurgery  [address] [address]  Phone: [phone]  Fax: [phone]  Follow Up Time:     Kaylynn Trinh  Surgery  [address], [address]  [place] [address]  Phone: [phone]  Fax: [phone]  Follow Up Time:     Jose Recinos  Pulmonary Disease  [address], [address] [place] [address]  Phone: [phone]  Fax: [phone]  Follow Up Time:     Jean Claude Farley  Neurology  [address] [address]  Phone: [phone]  Fax: [phone]  Follow Up Time:     Lauren Smith  Critical Care Medicine  [address], [address] [place] [address]  Phone: [phone]  Fax: [phone]  Follow Up Time:     Ruby Barr  Nephrology  [address], [address]  [place] [address]  Phone: [phone]  Fax: [phone]  Follow Up Time:

## 2024-10-20 NOTE — DISCHARGE NOTE PROVIDER - PROVIDER TOKENS
PROVIDER:[TOKEN:[[id number]]] PROVIDER:[TOKEN:[[id number]]],PROVIDER:[TOKEN:[[id number]]],PROVIDER:[TOKEN:[[id number]]] PROVIDER:[TOKEN:[[id number]]],PROVIDER:[TOKEN:[[id number]]],PROVIDER:[TOKEN:[[id number]]],PROVIDER:[TOKEN:[[id number]]],PROVIDER:[TOKEN:[[id number]]],PROVIDER:[TOKEN:[[id number]]]

## 2024-10-20 NOTE — PROGRESS NOTE ADULT - SUBJECTIVE AND OBJECTIVE BOX
HPI: 43 yo male, unknown past medical history (reported stroke and MI by coworkers) presented to [facility] with AMS, Pt was working at [employer] when he was found down by coworkers. EMS called and pt brought to [facility] ED. Intubated, sedated, started on cardene for SBPs in 200s. CT head showed brain stem bleed. (NIHSS 33, ICH score 3).  Transferred to NSICU for further management.     S/Overnight events: No events overnight.    Hospital Course:   9/30: transferred from [facility]. A line placed. Versed dc'd. Cy Rader at bedside, states pt has family in [place], cannot confirm medications or PMH other than stroke and MI. 250cc bolus 3% given. LR switched to NS. hydralazine 25q8 started, 3% started, switched propofol to precedex   10/1: stability CTH done. Added labetalol, started TF. Palliative consulted. ethics consulted to determine surrogate. febrile 103, pan cx sent  10/2: BD 2, GEORGE overnight. TF resumed. Desatt'd to 80s, FiO2 inc. to 50. Fentanyl given, ABG, CXR ordered. Maxxed on precedex, started on propofol for DARIEN -4 - -5. Precedex dc'd. Duonebs, mucomyst, hypertonic added. 3% dc'd. Cardene dc'd. Start vanc/CTX. Increased labetalol 200q8. MRSA negative, dc'd vanc. ETT pulled back 2cm x 2, good positioning after confirmatory chest xray. Ethics attempting to establish HCP with family. Na 159, starting FW 250q6 for range 150-155.   10/3: BD3, GEORGE o/n, neuro stable. Na elevating, FW increased to 300q6. Dc'd bowel reg for diarrhea. vEEG started. SQH 5000q8 tonight.   10/4: BD 4, albumn bolus, incr. LR to 80 2/2 incr. in Cr, LR to 100cc/hr for uptrending Cr. Started 7% hypersal for 48hrs and SL atropine for inline/oral thick secretions. Dc'd CTX and started ancef for MSSA in the sputum. Nephrology consulted for CKD, f/u recs. SBP 170s, given hydralazine 10mg IVP.   10/5: BD5, o/n 10mg IVP hydralazine given for SBP 170s and started on hydralazine 25q8 via OGT. 10mg IV push labetalol for SBP > 160s. RT placed for diarrhea.   10/6: BD6, o/n FW increased to 350q4 per nephrology recs. IV tylenol for temp 100.6, SBp 160s presumed uncomfortable.   10/7: BD7, overnight pancultured for temp 101.8F.   10/8: BD8. GEORGE. Cr bumped. decreased LR to 75cc/hr. Adding simethicone ATC. incr hydralazine 50mgTID. Incr labetalol 300mgTID. Na 145, decreased FWF to 250q6. Start precedex. FENa consistent with intrinsic kidney injury. Pend repeat renal US. Retaining up to 1.3L, bladder scans q6, straight cath PRN  10/9: BD 9. GEORGE overnight. Neuro stable. abd xray for distention w non-specific gas pattern, OGT to LIWS for morning. duonebs/mucomyst to q8 for improving secretions. Changed tube feeds to Jevity 1.5 20cc/hr, low rate due to abdominal distention, nepro dense and more difficult to digest. Tolerating CPAP, confirmed by ABG.   10/10: BD 10. GEORGE overnight. Neuro stable. (+) gabriel for urinary retention on bladder scan. inc TF to goal rate of 40cc/hr. family leaning toward pursuing trach/PEG. 1/2 amp for FS 81.   10/11: BD 11. GEORGE overnight. Neuro stable. Trach/PEG consults placed.   10/12: BD 12. GEORGE overnight. Neuro stable. MRI brain complete.   10/13: BD 13. Increase flomax. Hold SQH after PM dose for trach tm. IVL.   10/14: BD 14. GEORGE overnight, remains on AC/VC. Gabriel placed for urinary retention. Dc'd free water.  S/p trach with pulm. NGT placed and CXR confirmed in good position.   10/15: BD 15, GEORGE ovn. resumed feeds. spiked 101, pan cx sent.   10/16: BD 16. GEORGE ovn. Lokelma 5mg for K+ 5.4. Started vanc q 24/zosyn for empiric PNA coverage, IVF to 100/hr. PEG held for fever.   10/17: BD 17,  ordered serum osm and urine osm for am. Started sinemet for neurostimulation. Increased cardura to 0.8. Started FW 100q4, dc'd IVF. MRSA negative, dc'd vanc. NGT replaced d/t coiling.   10/18: BD 18, GEORGE overnight, neuro stable. Amantadine added for neurostim. zosyn changed to unasyn for acinetobacter baumannii, failed TOV and required SC  10/19: BD 19, GEORGE ovn. cardura 2mg added for retention. labetalol decreased 200q8, hydralazine decreased 25q8. Gabriel replaced.     Vital Signs Last 24 Hrs  T(C): 37.1 (19 Oct 2024 18:00), Max: 37.3 (19 Oct 2024 05:19)  T(F): 98.8 (19 Oct 2024 18:00), Max: 99.1 (19 Oct 2024 05:19)  HR: 81 (19 Oct 2024 21:00) (76 - 87)  BP: --  BP(mean): --  RR: 14 (19 Oct 2024 21:00) (13 - 18)  SpO2: 97% (19 Oct 2024 21:00) (93% - 100%)    Parameters below as of 19 Oct 2024 21:00  Patient On (Oxygen Delivery Method): ventilator    O2 Concentration (%): 40    I&O's Detail    18 Oct 2024 07:01  -  19 Oct 2024 07:00  --------------------------------------------------------  IN:    Enteral Tube Flush: 280 mL    Free Water: 500 mL    IV PiggyBack: 100 mL    IV PiggyBack: 325 mL    Jevity 1.5: 720 mL  Total IN: 1925 mL    OUT:    Indwelling Catheter - Urethral (mL): 635 mL    Intermittent Catheterization - Urethral (mL): 1550 mL    Voided (mL): 0 mL  Total OUT: 2185 mL    Total NET: -260 mL    19 Oct 2024 07:01  -  19 Oct 2024 22:02  --------------------------------------------------------  IN:    Enteral Tube Flush: 120 mL    Free Water: 300 mL    IV PiggyBack: 300 mL    Jevity 1.5: 360 mL  Total IN: 1080 mL    OUT:    Indwelling Catheter - Urethral (mL): 525 mL    Intermittent Catheterization - Urethral (mL): 450 mL  Total OUT: 975 mL    Total NET: 105 mL    I&O's Summary    18 Oct 2024 07:01  -  19 Oct 2024 07:00  --------------------------------------------------------  IN: 1925 mL / OUT: 2185 mL / NET: -260 mL    19 Oct 2024 07:01  -  19 Oct 2024 22:02  --------------------------------------------------------  IN: 1080 mL / OUT: 975 mL / NET: 105 mL    PHYSICAL EXAM:  Constitutional: Lying in bed.   HEENT: R eye taped shut. L pupil, round, reactive to light. Face symmetric, tongue midline.  Respiratory: +Trach No respiratory distress, lungs clear to auscultation bilaterally.   Cardiovascular: RRR, S1, S2.   Gastrointestinal: +NGT, abdomen soft, nondistended.  Neurological:  AAOX0, eyes open spontaneously. does not follow commands, does not track  Motor: RUE extends, RLE TF, LUE w/d, LLE w/d  Extremities: Warm, well perfused.  Wounds: none    TUBES/LINES:  [] CVC  [x] A-line  [] Lumbar Drain  [] Ventriculostomy  [x] Gabriel  [] Other    DIET:  [] NPO  [] Mechanical  [x] Tube feeds    LABS:                        9.5    6.93  )-----------( 460      ( 19 Oct 2024 05:30 )             31.0     10-19    135  |  104  |  47[H]  ----------------------------<  114[H]  4.9   |  20[L]  |  3.36[H]    Ca    8.7      19 Oct 2024 05:30  Phos  4.5     10-19  Mg     2.2     10-19    Urinalysis Basic - ( 19 Oct 2024 05:30 )  Color: x / Appearance: x / SG: x / pH: x  Gluc: 114 mg/dL / Ketone: x  / Bili: x / Urobili: x   Blood: x / Protein: x / Nitrite: x   Leuk Esterase: x / RBC: x / WBC x   Sq Epi: x / Non Sq Epi: x / Bacteria: x      CAPILLARY BLOOD GLUCOSE  POCT Blood Glucose.: 100 mg/dL (19 Oct 2024 17:31)  POCT Blood Glucose.: 101 mg/dL (19 Oct 2024 11:34)    Drug Levels: [] N/A  CSF Analysis: [] N/A    Allergies  Allergy Status Unknown  Intolerances    MEDICATIONS:  Antibiotics:  ampicillin/sulbactam  IVPB 3 Gram(s) IV Intermittent <User Schedule>    Neuro:  acetaminophen   Oral Liquid .. 650 milliGRAM(s) Oral every 6 hours PRN  amantadine 100 milliGRAM(s) Oral <User Schedule>  carbidopa/levodopa  25/100 1 Tablet(s) Oral <User Schedule>  oxyCODONE    IR 5 milliGRAM(s) Oral every 4 hours PRN    Anticoagulation:  heparin   Injectable 5000 Unit(s) SubCutaneous every 8 hours    OTHER:  acetylcysteine 10%  Inhalation 4 milliLiter(s) Inhalation every 12 hours  albuterol/ipratropium for Nebulization 3 milliLiter(s) Nebulizer every 12 hours  amLODIPine   Tablet 10 milliGRAM(s) Oral daily  bisacodyl Suppository 10 milliGRAM(s) Rectal daily PRN  chlorhexidine 2% Cloths 1 Application(s) Topical <User Schedule>  doxazosin 2 milliGRAM(s) Oral at bedtime  erythromycin   Ointment 1 Application(s) Right EYE every 6 hours  hydrALAZINE 25 milliGRAM(s) Oral every 8 hours  labetalol 200 milliGRAM(s) Oral every 8 hours  pantoprazole  Injectable 40 milliGRAM(s) IV Push daily  petrolatum Ophthalmic Ointment 1 Application(s) Both EYES two times a day  polyethylene glycol 3350 17 Gram(s) Oral two times a day  senna 2 Tablet(s) Oral at bedtime  tamsulosin 0.8 milliGRAM(s) Oral at bedtime    IVF:    CULTURES:  Culture Results:   Mixed gram negative rods including  Moderate Acinetobacter baumannii/nosocomialis group (10-16 @ 00:13)  Culture Results:   No growth at 72 Hours (10-15 @ 14:55)    RADIOLOGY & ADDITIONAL TESTS:    ASSESSMENT:  43 y/o PMH ?stroke/MI present to [facility] after collapsing at work. Decorticate posturing, vomiting, intubated for airway protection. 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 Transferred to [facility] for further management. s/p trach 10/14.     Neuro:  - Neuro q4/vitals q1  - pain control: Tylenol prn, oxy prn  - CTH 9/30: enlarged pontine hemorrhage, CTH 10/3 done read stable   - vEEG (10/3-4)- negative, (10/17-10/19) - negative  - stroke core measures, stroke neuro following  - MRI brain w/ w/o contrast 10/12: parenchymal hemorrhage, acute/subacute R cerebellar stroke    - neurostimulation: amantadine, sinemet 25/100 BID    CV:  - SBP <150  - HTN: amlodipine 10 mg qd, labetalol 200 mg q8h, hydralazine 25 mg q8h   - echo (9/30) EF 75%    Resp:  - trach to vent, CPAP as tolerated  - duonebs/mucomyst q12    GI:  - TF via NGT, plan for PEG 10/21?  - bowel reg, LBM 10/17  - GI ppx: protonix while intubated    Renal:  - IVL, FW 100q4  - Urinary retenion: Flomax 0.8mg, cardura 2mg   - S/p gabriel (10/14-10/18), (10/19 - )  - CKD: trend Cr   - renal US 10/1: echogenicity c/w chronic med renal dz, repeat 10/8: inc renal echogeicity, c/f medical renal disease w increased hydro     Endo:  - A1c 5.4    Heme:  - DVT ppx: SCDs, SQH 5000q8     ID:  - last pan cx 10/15  - 10/15 sputum +actinobacter baumanii, on unasyn (10/18- )  - MSSA swab positive (10/17)  - MRSA swab negative (10/2), sputum MSSA + , s/p 1 dose vanc (10/2), CTX (10/2 - 10/4), Ancef (10/4-10/14)     Dispo: NSICU, full code, pending placement    D/w Dr. D'Amico

## 2024-10-20 NOTE — PROGRESS NOTE ADULT - SUBJECTIVE AND OBJECTIVE BOX
[facility] -- ADDITIONAL PROGRESS NOTE    Nighttime rounds were performed     Patient is a 43 y/o male with unknown past medical history (reported stroke and MI by coworkers). He presented to [facility] with AMS. Prior to this, pt was working at [employer] when he was found down by coworkers. EMS called and pt brought to [facility] ED. Intubated, sedated, started on cardene for SBPs in 200s. CT head showed pontine bleed. ICH score: 3. Transferred to NSICU for further management.  (30 Sep 2024 12:55)      ICU Vital Signs Last 24 Hrs  T(C): 36.9 (20 Oct 2024 17:40), Max: 37.2 (20 Oct 2024 00:44)  T(F): 98.4 (20 Oct 2024 17:40), Max: 99 (20 Oct 2024 00:44)  HR: 86 (20 Oct 2024 20:25) (73 - 87)  ABP: 136/73 (20 Oct 2024 20:00) (110/65 - 141/75)  ABP(mean): 93 (20 Oct 2024 20:00) (78 - 99)  RR: 11 (20 Oct 2024 20:00) (10 - 24)  SpO2: 98% (20 Oct 2024 20:25) (97% - 100%)      10-19-24 @ 07:01  -  10-20-24 @ 07:00  --------------------------------------------------------  IN: 1600 mL / OUT: 1635 mL / NET: -35 mL    10-20-24 @ 07:01  -  10-20-24 @ 20:35  --------------------------------------------------------  IN: 1380 mL / OUT: 860 mL / NET: 520 mL      Mode: CPAP with PS, FiO2: 40, PEEP: 5, PS: 10, MAP: 7, PIP: 16      PHYSICAL EXAM:  Neuro: Eyes open; not tracking, corneals present bilaterally, does not doll to movement of head, +cough, overbreathes vent set rate  B/L uppers extends (at times trace withdraws RUE) , b/l  LE TFs (at times WD LLE)  Not following commands  Pulm: Diminshed  Cards: S1, S2, RRR  GI: Soft, Nontender, Nondistended   Extremities: No LE edema       MEDICATIONS:   acetaminophen   Oral Liquid .. 650 milliGRAM(s) Oral every 6 hours PRN  acetylcysteine 10%  Inhalation 4 milliLiter(s) Inhalation every 12 hours  albuterol/ipratropium for Nebulization 3 milliLiter(s) Nebulizer every 12 hours  amantadine 100 milliGRAM(s) Oral <User Schedule>  amLODIPine   Tablet 10 milliGRAM(s) Oral daily  ampicillin/sulbactam  IVPB 3 Gram(s) IV Intermittent <User Schedule>  bisacodyl Suppository 10 milliGRAM(s) Rectal daily PRN  carbidopa/levodopa  25/100 1 Tablet(s) Oral <User Schedule>  chlorhexidine 2% Cloths 1 Application(s) Topical <User Schedule>  doxazosin 2 milliGRAM(s) Oral at bedtime  erythromycin   Ointment 1 Application(s) Right EYE every 6 hours  heparin   Injectable 5000 Unit(s) SubCutaneous every 8 hours  hydrALAZINE 25 milliGRAM(s) Oral every 8 hours  labetalol 100 milliGRAM(s) Oral every 8 hours  oxyCODONE    IR 5 milliGRAM(s) Oral every 4 hours PRN  pantoprazole  Injectable 40 milliGRAM(s) IV Push daily  petrolatum Ophthalmic Ointment 1 Application(s) Both EYES two times a day  polyethylene glycol 3350 17 Gram(s) Oral two times a day  senna 2 Tablet(s) Oral at bedtime  sodium chloride 0.9%. 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 Severe slowing. No seizures. DCed  Analgesia prn  Palliative/ethics following    PULM:   S/P trach  CPAP as tolerated  Pulm toilet    CV:  SBP goal 100-160  Labetalol, hydralazine, amlodipine (titrate)    RENAL: CKD (superimposed ANIKA). Hyponatremia resolved.  Na goal 135-145. Monitor BMPs  Vuong catheter  Flomax 0.8mg,  cardura 2mg  Nephrology following    GI:  Diet: TFs at goal via NGT  PEG pending  GI prophylaxis [] not indicated [x] PPI [] other:  Bowel regimen [] colace [x] senna [] other: miralax  LBM: 10/17    ENDO:   Goal euglycemia (-180)  A1c: 5.3    HEME/ONC: Mild thrombocytosis  VTE prophylaxis: [x] SCDs [x] chemoprophylaxis SQH  LE dopplers negative 10/19    ID: Febrile; recurrent  Cultures: Blood cultures; NGTD, sputum acetinobacter baumanii  Antibiotics:  Zosyn->Unasyn  re: acetinobacter baumanii    Patient remains critically ill.    Additional 45 minutes of critical care time.                 [facility] ADDITIONAL PROGRESS NOTE    Nighttime rounds were performed     Patient is a 43 y/o male with unknown past medical history (reported stroke and MI by coworkers). He presented to [facility] with AMS. Prior to this, pt was working at [employer] when he was found down by coworkers. EMS called and pt brought to [facility] ED. Intubated, sedated, started on cardene for SBPs in 200s. CT head showed pontine bleed. ICH score: 3. Transferred to NSICU for further management.  (30 Sep 2024 12:55)      ICU Vital Signs Last 24 Hrs  T(C): 36.9 (20 Oct 2024 17:40), Max: 37.2 (20 Oct 2024 00:44)  T(F): 98.4 (20 Oct 2024 17:40), Max: 99 (20 Oct 2024 00:44)  HR: 86 (20 Oct 2024 20:25) (73 - 87)  ABP: 136/73 (20 Oct 2024 20:00) (110/65 - 141/75)  ABP(mean): 93 (20 Oct 2024 20:00) (78 - 99)  RR: 11 (20 Oct 2024 20:00) (10 - 24)  SpO2: 98% (20 Oct 2024 20:25) (97% - 100%)      10-19-24 @ 07:01  -  10-20-24 @ 07:00  --------------------------------------------------------  IN: 1600 mL / OUT: 1635 mL / NET: -35 mL    10-20-24 @ 07:01  -  10-20-24 @ 20:35  --------------------------------------------------------  IN: 1380 mL / OUT: 860 mL / NET: 520 mL      Mode: CPAP with PS, FiO2: 40, PEEP: 5, PS: 10, MAP: 7, PIP: 16      PHYSICAL EXAM:  Neuro: Eyes open; not tracking, corneals present bilaterally, does not doll to movement of head, +cough, overbreathes vent set rate  B/L uppers extends (at times trace withdraws RUE) , b/l  LE TFs (at times WD LLE)  Not following commands  Pulm: Diminshed  Cards: S1, S2, RRR  GI: Soft, Nontender, Nondistended   Extremities: No LE edema       MEDICATIONS:   acetaminophen   Oral Liquid .. 650 milliGRAM(s) Oral every 6 hours PRN  acetylcysteine 10%  Inhalation 4 milliLiter(s) Inhalation every 12 hours  albuterol/ipratropium for Nebulization 3 milliLiter(s) Nebulizer every 12 hours  amantadine 100 milliGRAM(s) Oral <User Schedule>  amLODIPine   Tablet 10 milliGRAM(s) Oral daily  ampicillin/sulbactam  IVPB 3 Gram(s) IV Intermittent <User Schedule>  bisacodyl Suppository 10 milliGRAM(s) Rectal daily PRN  carbidopa/levodopa  25/100 1 Tablet(s) Oral <User Schedule>  chlorhexidine 2% Cloths 1 Application(s) Topical <User Schedule>  doxazosin 2 milliGRAM(s) Oral at bedtime  erythromycin   Ointment 1 Application(s) Right EYE every 6 hours  heparin   Injectable 5000 Unit(s) SubCutaneous every 8 hours  hydrALAZINE 25 milliGRAM(s) Oral every 8 hours  labetalol 100 milliGRAM(s) Oral every 8 hours  oxyCODONE    IR 5 milliGRAM(s) Oral every 4 hours PRN  pantoprazole  Injectable 40 milliGRAM(s) IV Push daily  petrolatum Ophthalmic Ointment 1 Application(s) Both EYES two times a day  polyethylene glycol 3350 17 Gram(s) Oral two times a day  senna 2 Tablet(s) Oral at bedtime  sodium chloride 0.9%. 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 Severe slowing. No seizures.   Analgesia prn  Palliative/ethics following    PULM:   S/P trach  CPAP as tolerated  Pulm toilet    CV:  SBP goal 100-160  Labetalol, hydralazine, amlodipine (titrate)    RENAL: CKD (superimposed ANIKA). Hyponatremia resolved.  Na goal 135-145. Monitor BMPs re: hyperkalemia; S/P lokelma 10/20  Vuong catheter; monitor Is/Os  Flomax 0.8mg,  cardura 2mg    GI:  Diet: TFs at goal via NGT. NPO at MN  PEG pending 10/21  GI prophylaxis [] not indicated [x] PPI [] other:  Bowel regimen [] colace [x] senna [] other: miralax  LBM: 10/20 x 2    ENDO:   Goal euglycemia (-180)  A1c: 5.3  Fingersticks while NPO    HEME/ONC:  VTE prophylaxis: [x] SCDs [x] chemoprophylaxis SQH  Monitor H/H  LE dopplers negative 10/19    ID: Febrile; recurrent  Cultures: Blood cultures; NGTD, sputum acetinobacter baumanii  Antibiotics:  Zosyn->Unasyn  re: acetinobacter baumanii    Patient remains critically ill.    Additional 45 minutes of critical care time.

## 2024-10-20 NOTE — DISCHARGE NOTE PROVIDER - NSDCFUADDINST_GEN_ALL_CORE_FT
Neurosurgery follow up appointment date/time:  - are staples/sutures in place?  - what day should staples/sutures be removed ([id number])?   - please call the office to confirm appointment: [phone]    Groin Wound Care:  - remove groin dressing tomorrow  - steri strips underneath will fall off on their own   - you may shower tomorrow  - if you received a "closure device" in your groin, no bathing or swimming for 5 days (any closure besides manual compression)    Cranial Wound Care:  - can patient shower?  - does dressing need to be changed/removed?  - no picking at incision   - pressure ulcer    Devices/Drains/Lines:   - does patient need colalr or brace or helmet?  - does collar/brace need to be worn at all times or just when OOB  - RW or cane for ambulation:  - PICC in place? ID follow up? (Paper rx for: antibiotics, heparin flush, weekly labs)  - MARIA in place? management/plastics follow?  - gabriel in place? management/urology follow up?  - Tracheostomy?   - PEG tube?    Activity:  - fatigue is common after surgery, rest if you feel tired   - no bending, lifting, twisting   - walking is recommended, ambulate as tolerated  - you may shower at home/rehab, keep your groin and cranial site incision dry   - no soaking in a tub/pool/hot tub  - no driving within 24 hours of anesthesia or while taking prescription pain medications   - keep hydrated, drink plenty of water   - if your wrist was used to access, do not lift more than 5lbs for 3 days  - if groin access, do not lift more than 10lbs for 5 days    Diet:  - You may resume your regular diet.  - Drinking extra fluids (water, juice) is encouraged.  - Abstain from alcohol for 24 hours.    Inpatient consults:  - final recommendations  - you will need follow up with....    Please also follow up with your primary care doctor.     Pain Expectations:  - A mild pain at the puncture site is not unusual.  - You may take Tylenol 1-2 tabs every 4-6 hours as needed   - If the pain persists after 24 hours contact the office at [phone]    Medications:  - changes to home meds (ex. AED's)?  *HOLD Metformin, diuretic, ARB's for 2 days after angiogram   - new meds?  - pain meds?  - when can antiplatelets or anticoagulants be restarted?  - were adverse affects of meds discussed with patients?   - pain medications can cause constipation, you should eat a high fiber diet and may take a stool softener while on pain meds     Call the office or come to the ED if:  - wound has drainage or bleeding, increased redness or pain at incisoin site, neurological change, fever (>101), chills, night sweats, syncope, nausea/vomiting, chest pain, shortness of breath    SPECIAL INSTRUCTIONS S/P ANGIOGRAPHY:  Signs and symptoms to look out for:  1. Esteban bleeding from the puncture site is an emergency. Put direct pressure on the site and go directly to your local Emergency Room for treatment.  2. Bleeding under the skin may also occur and a small "black and blue" may be expected. If there appears to be an expanding mass or swelling around the puncture site, apply manual compression and go immediately to your local Emergency Room for treatment.  3. If your foot/leg or hand/arm (puncture site) becomes cool or blue and/or you are unable to move it, this must be treated as an emergency. Go directly to your local Emergency Room for treatment.  4. Excessive puncture site pain is abnormal and should be assessed.  5.  Look out for signs of infection in the puncture site: fever, red streaking of the leg or wrist, drainage, severe pain.  6.  Lack of adequate urine output, provided you are drinking enough fluids, may be cause for concern, notify us if you think this is the case.    Playback:  - are discharge instruction on playback?  - is a picture of the incision on playback?     WITHIN 24 HOURS OF DISCHARGE, PLEASE CONTACT NEURO PA  WITH ANY QUESTIONS OR CONCERNS: [phone]   OTHERWISE, PLEASE CALL THE OFFICE WITH ANY QUESTIONS OR CONCERNS: [phone] Neurosurgery follow up appointment date/time:  - please call the office to confirm appointment: [phone]    Devices/Drains/Lines:   - Tracheostomy  - PEG tube    Activity:  - fatigue is common after brain bleed, rest if you feel tired     Diet:  - Tube feeds via PEG tube     Inpatient consults:  - Pulm  - General Surgery  - Nephrology  - Epilepsy     Please also follow up with your primary care doctor.     Medications:  - changes to home meds (ex. AED's)?  *HOLD Metformin, diuretic, ARB's for 2 days after angiogram   - new meds?  - pain meds?  - when can antiplatelets or anticoagulants be restarted?  - were adverse affects of meds discussed with patients?   - pain medications can cause constipation, you should eat a high fiber diet and may take a stool softener while on pain meds     Call the office or come to the ED if:  - wound has drainage or bleeding, increased redness or pain at incisoin site, neurological change, fever (>101), chills, night sweats, syncope, nausea/vomiting, chest pain, shortness of breath    Playback:  - see [employer] health for a copy of your discharge paperwork     WITHIN 24 HOURS OF DISCHARGE, PLEASE CONTACT NEURO PA  WITH ANY QUESTIONS OR CONCERNS: [phone]   OTHERWISE, PLEASE CALL THE OFFICE WITH ANY QUESTIONS OR CONCERNS: [phone] Neurosurgery follow up appointment date/time:  - please call the office to confirm appointment: [phone]    Devices/Drains/Lines:   - Tracheostomy  - PEG tube    Activity:  - fatigue is common after brain bleed, rest if you feel tired     Diet:  - Tube feeds via PEG tube     Inpatient consults:  - Pulm  - General Surgery  - Nephrology  - Epilepsy     Please also follow up with your primary care doctor.     Medications:  - changes to home meds (ex. AED's)?  - new meds?  - pain meds?  - when can antiplatelets or anticoagulants be restarted?  - were adverse affects of meds discussed with patients?   - pain medications can cause constipation, you should eat a high fiber diet and may take a stool softener while on pain meds     Call the office or come to the ED if:  - wound has drainage or bleeding, increased redness or pain at incisoin site, neurological change, fever (>101), chills, night sweats, syncope, nausea/vomiting, chest pain, shortness of breath    Playback:  - see [employer] [facility] for a copy of your discharge paperwork     WITHIN 24 HOURS OF DISCHARGE, PLEASE CONTACT NEURO PA  WITH ANY QUESTIONS OR CONCERNS: [phone]   OTHERWISE, PLEASE CALL THE OFFICE WITH ANY QUESTIONS OR CONCERNS: [phone] Neurosurgery follow up appointment date/time:  - please call the office to confirm appointment: [phone]    Devices/Drains/Lines:   - Tracheostomy (due for replacement 4/14), full vent support   - PEG tube  - Gabriel catheter (due for replacement 4/12)    Activity:  - fatigue is common after brain bleed, rest if you feel tired     Diet:  - Tube feeds via PEG tube     Inpatient consults:  - Pulmonology: Follow up for tracheostomy exchange every 2 months, next 4/14/25  - General Surgery: Follow up with Dr. Trinh for management of PEG tube  - Nephrology: Follow up with Nephrology for management of chronic kidney disease  - Epilepsy: Follow up with Dr. Farley for management of your antiseizure medications  - Opthalmology: Follow up with Ophthalmology for management of keratitis      Please also follow up with your primary care doctor.     At rehab:  1. will be due for gabriel exchange 4/12  Medications:  - new meds:  Doxazosin for urinary retention  Duonebs every 4 hours for pulmonary secretions   Erythromycin ointment, artificial tears, ocular lubricant ointment for keratitis  Gabapentin, Lacosamide, Levetiracetam, Phenytoin for seizures  Heparin SQ for DVT prevention  Metoprolol for hypertension/tachycardia  Pantoprazole for GI protection  bowel regimen: Dulcolax: Miralax, Senna   - pain meds:  Tylenol as needed  - adverse affects of meds discussed with patients  - pain medications can cause constipation, take a stool softener while on pain meds     Call the office or come to the ED if:  - neurological change, seizures, fever (>101), chills, night sweats, syncope, nausea/vomiting, chest pain, shortness of breath    Playback:  - see [employer] [facility] for a copy of your discharge paperwork     WITHIN 24 HOURS OF DISCHARGE, PLEASE CONTACT NEURO PA  WITH ANY QUESTIONS OR CONCERNS: [phone]   OTHERWISE, PLEASE CALL THE OFFICE WITH ANY QUESTIONS OR CONCERNS: [phone]

## 2024-10-21 ENCOUNTER — TRANSCRIPTION ENCOUNTER (OUTPATIENT)
Age: 44
End: 2024-10-21

## 2024-10-21 LAB
ANION GAP SERPL CALC-SCNC: 9 MMOL/L — SIGNIFICANT CHANGE UP (ref 5–17)
APTT BLD: 31.6 SEC — SIGNIFICANT CHANGE UP (ref 24.5–35.6)
BLD GP AB SCN SERPL QL: NEGATIVE — SIGNIFICANT CHANGE UP
BUN SERPL-MCNC: 48 MG/DL — HIGH (ref 7–23)
CALCIUM SERPL-MCNC: 8.7 MG/DL — SIGNIFICANT CHANGE UP (ref 8.4–10.5)
CHLORIDE SERPL-SCNC: 108 MMOL/L — SIGNIFICANT CHANGE UP (ref 96–108)
CO2 SERPL-SCNC: 20 MMOL/L — LOW (ref 22–31)
CREAT SERPL-MCNC: 3.4 MG/DL — HIGH (ref 0.5–1.3)
EGFR: 22 ML/MIN/1.73M2 — LOW
EGFR: 22 ML/MIN/1.73M2 — LOW
GLUCOSE BLDC GLUCOMTR-MCNC: 110 MG/DL — HIGH (ref 70–99)
GLUCOSE BLDC GLUCOMTR-MCNC: 94 MG/DL — SIGNIFICANT CHANGE UP (ref 70–99)
GLUCOSE BLDC GLUCOMTR-MCNC: 94 MG/DL — SIGNIFICANT CHANGE UP (ref 70–99)
GLUCOSE SERPL-MCNC: 106 MG/DL — HIGH (ref 70–99)
HCT VFR BLD CALC: 29.1 % — LOW (ref 39–50)
HGB BLD-MCNC: 8.9 G/DL — LOW (ref 13–17)
INR BLD: 1.05 — SIGNIFICANT CHANGE UP (ref 0.85–1.16)
MAGNESIUM SERPL-MCNC: 2.2 MG/DL — SIGNIFICANT CHANGE UP (ref 1.6–2.6)
MCHC RBC-ENTMCNC: 27.8 PG — SIGNIFICANT CHANGE UP (ref 27–34)
MCHC RBC-ENTMCNC: 30.6 GM/DL — LOW (ref 32–36)
MCV RBC AUTO: 90.9 FL — SIGNIFICANT CHANGE UP (ref 80–100)
NRBC # BLD: 0 /100 WBCS — SIGNIFICANT CHANGE UP (ref 0–0)
NRBC BLD-RTO: 0 /100 WBCS — SIGNIFICANT CHANGE UP (ref 0–0)
PHOSPHATE SERPL-MCNC: 5.1 MG/DL — HIGH (ref 2.5–4.5)
PLATELET # BLD AUTO: 408 K/UL — HIGH (ref 150–400)
POTASSIUM SERPL-MCNC: 5.2 MMOL/L — SIGNIFICANT CHANGE UP (ref 3.5–5.3)
POTASSIUM SERPL-SCNC: 5.2 MMOL/L — SIGNIFICANT CHANGE UP (ref 3.5–5.3)
PROTHROM AB SERPL-ACNC: 12.3 SEC — SIGNIFICANT CHANGE UP (ref 9.9–13.4)
RBC # BLD: 3.2 M/UL — LOW (ref 4.2–5.8)
RBC # FLD: 12.6 % — SIGNIFICANT CHANGE UP (ref 10.3–14.5)
RH IG SCN BLD-IMP: POSITIVE — SIGNIFICANT CHANGE UP
SODIUM SERPL-SCNC: 137 MMOL/L — SIGNIFICANT CHANGE UP (ref 135–145)
WBC # BLD: 7.39 K/UL — SIGNIFICANT CHANGE UP (ref 3.8–10.5)
WBC # FLD AUTO: 7.39 K/UL — SIGNIFICANT CHANGE UP (ref 3.8–10.5)

## 2024-10-21 PROCEDURE — 99291 CRITICAL CARE FIRST HOUR: CPT

## 2024-10-21 PROCEDURE — 99253 IP/OBS CNSLTJ NEW/EST LOW 45: CPT | Mod: GC,25

## 2024-10-21 PROCEDURE — 99232 SBSQ HOSP IP/OBS MODERATE 35: CPT

## 2024-10-21 PROCEDURE — 43246 EGD PLACE GASTROSTOMY TUBE: CPT | Mod: GC

## 2024-10-21 PROCEDURE — 71045 X-RAY EXAM CHEST 1 VIEW: CPT | Mod: 26

## 2024-10-21 RX ORDER — OXYCODONE HYDROCHLORIDE 30 MG/1
5 TABLET ORAL EVERY 4 HOURS
Refills: 0 | Status: DISCONTINUED | OUTPATIENT
Start: 2024-10-21 | End: 2024-10-22

## 2024-10-21 RX ORDER — LABETALOL HYDROCHLORIDE 200 MG/1
50 TABLET, FILM COATED ORAL EVERY 8 HOURS
Refills: 0 | Status: DISCONTINUED | OUTPATIENT
Start: 2024-10-21 | End: 2024-10-22

## 2024-10-21 RX ORDER — ACETAMINOPHEN 500 MG/5ML
650 LIQUID (ML) ORAL EVERY 6 HOURS
Refills: 0 | Status: DISCONTINUED | OUTPATIENT
Start: 2024-10-21 | End: 2024-11-03

## 2024-10-21 RX ORDER — TAMSULOSIN HYDROCHLORIDE 0.4 MG/1
0.8 CAPSULE ORAL AT BEDTIME
Refills: 0 | Status: DISCONTINUED | OUTPATIENT
Start: 2024-10-21 | End: 2024-10-24

## 2024-10-21 RX ORDER — DOXAZOSIN MESYLATE 8 MG/1
4 TABLET ORAL AT BEDTIME
Refills: 0 | Status: DISCONTINUED | OUTPATIENT
Start: 2024-10-21 | End: 2024-10-21

## 2024-10-21 RX ORDER — DOXAZOSIN MESYLATE 8 MG/1
4 TABLET ORAL AT BEDTIME
Refills: 0 | Status: DISCONTINUED | OUTPATIENT
Start: 2024-10-21 | End: 2024-10-24

## 2024-10-21 RX ORDER — LABETALOL HYDROCHLORIDE 200 MG/1
50 TABLET, FILM COATED ORAL EVERY 8 HOURS
Refills: 0 | Status: DISCONTINUED | OUTPATIENT
Start: 2024-10-21 | End: 2024-10-21

## 2024-10-21 RX ORDER — SENNA 187 MG
2 TABLET ORAL AT BEDTIME
Refills: 0 | Status: DISCONTINUED | OUTPATIENT
Start: 2024-10-21 | End: 2024-10-23

## 2024-10-21 RX ORDER — SODIUM ZIRCONIUM CYCLOSILICATE 5 G/5G
5 POWDER, FOR SUSPENSION ORAL DAILY
Refills: 0 | Status: DISCONTINUED | OUTPATIENT
Start: 2024-10-21 | End: 2024-10-21

## 2024-10-21 RX ORDER — FENTANYL CITRATE-0.9 % NACL/PF 100MCG/2ML
25 SYRINGE (ML) INTRAVENOUS ONCE
Refills: 0 | Status: DISCONTINUED | OUTPATIENT
Start: 2024-10-21 | End: 2024-10-21

## 2024-10-21 RX ORDER — AMANTADINE HCL 100 MG
100 CAPSULE ORAL
Refills: 0 | Status: DISCONTINUED | OUTPATIENT
Start: 2024-10-21 | End: 2024-10-24

## 2024-10-21 RX ORDER — POLYETHYLENE GLYCOL 3350 17 G/17G
17 POWDER, FOR SOLUTION ORAL
Refills: 0 | Status: DISCONTINUED | OUTPATIENT
Start: 2024-10-21 | End: 2024-10-23

## 2024-10-21 RX ORDER — CARBIDOPA/LEVODOPA 25MG-100MG
1 TABLET ORAL
Refills: 0 | Status: DISCONTINUED | OUTPATIENT
Start: 2024-10-21 | End: 2024-10-25

## 2024-10-21 RX ORDER — AMLODIPINE BESYLATE 10 MG/1
10 TABLET ORAL DAILY
Refills: 0 | Status: DISCONTINUED | OUTPATIENT
Start: 2024-10-21 | End: 2024-11-25

## 2024-10-21 RX ORDER — SODIUM ZIRCONIUM CYCLOSILICATE 5 G/5G
5 POWDER, FOR SUSPENSION ORAL DAILY
Refills: 0 | Status: DISCONTINUED | OUTPATIENT
Start: 2024-10-21 | End: 2024-10-23

## 2024-10-21 RX ADMIN — Medication 1 APPLICATION(S): at 05:08

## 2024-10-21 RX ADMIN — HEPARIN SODIUM 5000 UNIT(S): 1000 INJECTION INTRAVENOUS; SUBCUTANEOUS at 14:07

## 2024-10-21 RX ADMIN — Medication 25 MILLIGRAM(S): at 05:09

## 2024-10-21 RX ADMIN — Medication 80 MILLILITER(S): at 11:55

## 2024-10-21 RX ADMIN — Medication 667 MILLIGRAM(S): at 17:49

## 2024-10-21 RX ADMIN — Medication 25 MICROGRAM(S): at 10:30

## 2024-10-21 RX ADMIN — AMPICILLIN SODIUM AND SULBACTAM SODIUM 200 GRAM(S): 1; .5 INJECTION, POWDER, FOR SOLUTION INTRAMUSCULAR; INTRAVENOUS at 14:05

## 2024-10-21 RX ADMIN — AMLODIPINE BESYLATE 10 MILLIGRAM(S): 10 TABLET ORAL at 05:09

## 2024-10-21 RX ADMIN — IPRATROPIUM BROMIDE AND ALBUTEROL SULFATE 3 MILLILITER(S): .5; 2.5 SOLUTION RESPIRATORY (INHALATION) at 16:37

## 2024-10-21 RX ADMIN — HEPARIN SODIUM 5000 UNIT(S): 1000 INJECTION INTRAVENOUS; SUBCUTANEOUS at 05:08

## 2024-10-21 RX ADMIN — Medication 1 APPLICATION(S): at 17:49

## 2024-10-21 RX ADMIN — IPRATROPIUM BROMIDE AND ALBUTEROL SULFATE 3 MILLILITER(S): .5; 2.5 SOLUTION RESPIRATORY (INHALATION) at 05:39

## 2024-10-21 RX ADMIN — Medication 80 MILLILITER(S): at 22:59

## 2024-10-21 RX ADMIN — TAMSULOSIN HYDROCHLORIDE 0.8 MILLIGRAM(S): 0.4 CAPSULE ORAL at 22:34

## 2024-10-21 RX ADMIN — ACETYLCYSTEINE 4 MILLILITER(S): 200 INHALANT RESPIRATORY (INHALATION) at 16:37

## 2024-10-21 RX ADMIN — Medication 1 TABLET(S): at 14:05

## 2024-10-21 RX ADMIN — LABETALOL HYDROCHLORIDE 100 MILLIGRAM(S): 200 TABLET, FILM COATED ORAL at 05:09

## 2024-10-21 RX ADMIN — HEPARIN SODIUM 5000 UNIT(S): 1000 INJECTION INTRAVENOUS; SUBCUTANEOUS at 21:25

## 2024-10-21 RX ADMIN — POLYETHYLENE GLYCOL 3350 17 GRAM(S): 17 POWDER, FOR SOLUTION ORAL at 05:08

## 2024-10-21 RX ADMIN — AMPICILLIN SODIUM AND SULBACTAM SODIUM 200 GRAM(S): 1; .5 INJECTION, POWDER, FOR SOLUTION INTRAMUSCULAR; INTRAVENOUS at 09:55

## 2024-10-21 RX ADMIN — Medication 667 MILLIGRAM(S): at 11:31

## 2024-10-21 RX ADMIN — Medication 2 TABLET(S): at 22:34

## 2024-10-21 RX ADMIN — AMPICILLIN SODIUM AND SULBACTAM SODIUM 200 GRAM(S): 1; .5 INJECTION, POWDER, FOR SOLUTION INTRAMUSCULAR; INTRAVENOUS at 06:24

## 2024-10-21 RX ADMIN — AMPICILLIN SODIUM AND SULBACTAM SODIUM 200 GRAM(S): 1; .5 INJECTION, POWDER, FOR SOLUTION INTRAMUSCULAR; INTRAVENOUS at 17:49

## 2024-10-21 RX ADMIN — Medication 1 TABLET(S): at 05:09

## 2024-10-21 RX ADMIN — LABETALOL HYDROCHLORIDE 50 MILLIGRAM(S): 200 TABLET, FILM COATED ORAL at 21:25

## 2024-10-21 RX ADMIN — Medication 40 MILLIGRAM(S): at 11:30

## 2024-10-21 RX ADMIN — ERYTHROMYCIN 1 APPLICATION(S): 5 OINTMENT OPHTHALMIC at 05:08

## 2024-10-21 RX ADMIN — ERYTHROMYCIN 1 APPLICATION(S): 5 OINTMENT OPHTHALMIC at 11:31

## 2024-10-21 RX ADMIN — Medication 100 MILLIGRAM(S): at 05:09

## 2024-10-21 RX ADMIN — DOXAZOSIN MESYLATE 4 MILLIGRAM(S): 8 TABLET ORAL at 22:34

## 2024-10-21 RX ADMIN — Medication 25 MICROGRAM(S): at 09:45

## 2024-10-21 RX ADMIN — LABETALOL HYDROCHLORIDE 50 MILLIGRAM(S): 200 TABLET, FILM COATED ORAL at 14:05

## 2024-10-21 RX ADMIN — SODIUM ZIRCONIUM CYCLOSILICATE 5 GRAM(S): 5 POWDER, FOR SUSPENSION ORAL at 09:55

## 2024-10-21 RX ADMIN — POLYETHYLENE GLYCOL 3350 17 GRAM(S): 17 POWDER, FOR SOLUTION ORAL at 17:49

## 2024-10-21 RX ADMIN — Medication 25 MILLIGRAM(S): at 14:06

## 2024-10-21 RX ADMIN — Medication 1 APPLICATION(S): at 05:10

## 2024-10-21 RX ADMIN — ACETYLCYSTEINE 4 MILLILITER(S): 200 INHALANT RESPIRATORY (INHALATION) at 05:39

## 2024-10-21 RX ADMIN — AMPICILLIN SODIUM AND SULBACTAM SODIUM 200 GRAM(S): 1; .5 INJECTION, POWDER, FOR SOLUTION INTRAMUSCULAR; INTRAVENOUS at 21:25

## 2024-10-21 RX ADMIN — AMPICILLIN SODIUM AND SULBACTAM SODIUM 200 GRAM(S): 1; .5 INJECTION, POWDER, FOR SOLUTION INTRAMUSCULAR; INTRAVENOUS at 02:36

## 2024-10-21 RX ADMIN — ERYTHROMYCIN 1 APPLICATION(S): 5 OINTMENT OPHTHALMIC at 17:50

## 2024-10-21 RX ADMIN — Medication 25 MILLIGRAM(S): at 21:25

## 2024-10-21 NOTE — PROGRESS NOTE ADULT - REASON FOR ADMISSION
Patient had a L TKR 4/22/19.  She is in constant pain.  She is not able to sleep or get comfortable.  She elevates her knee above her heart twice a day.  She is icing. She gets home therapy daily.  She feels like she is unable to move forward in therapy.  She would like to know how to get her pain under control.  She needs a refill of Dilaudid and MS Contin.  She ran out of the Dilaudid and has 4 tablets of MS Contin left.     brain stem bleed

## 2024-10-21 NOTE — BRIEF OPERATIVE NOTE - NSICDXBRIEFPOSTOP_GEN_ALL_CORE_FT
POST-OP DIAGNOSIS:  Brainstem stroke 14-Oct-2024 15:12:19  Erick Gonzalez  
POST-OP DIAGNOSIS:  Brain stem stroke syndrome 21-Oct-2024 09:49:04  Rik Judd

## 2024-10-21 NOTE — BRIEF OPERATIVE NOTE - NSICDXBRIEFPROCEDURE_GEN_ALL_CORE_FT
PROCEDURES:  Percutaneous tracheal puncture 14-Oct-2024 15:09:51  Erick Gonzalez  
PROCEDURES:  EGD, with PEG 21-Oct-2024 09:48:45  Rik Judd

## 2024-10-21 NOTE — BRIEF OPERATIVE NOTE - OPERATION/FINDINGS
EGD performed with normal appearance of esophagus, stomach, and duodenum. 1:1 visualization of body of the stomach confirmed with transillumination and direct palpation. Stab incision made and needle visualized entering body of stomach. Wire advanced and endoscope withdrawn. PEG then pulled through without resistance. Endoscopy performed again with PEG tube inside body of stomach without evidence of bleeding. Bumper placed at 3.5cm from skin. Pt tolerated procedure well.
Consent was obtained explaining the risks and benefits of the procedure. The patient was placed supine with a roll under shoulders to hyperextend the neck.  The FiO2 was increased to 1.0 and endotracheal tube was adjusted under fiberoptic guidance to the highest position safely possible.  The bronchoscope was introduced into the distal end of the endotracheal tube to monitor the procedure.  Tracheal landmarks were identified and marked.  The procedure site was prepped and draped and the patient sedated on a propofol drip.  The overlying skin was anesthetized with 1% lidocaine with epinephrine and a thin gauge needle gradually inserted below the second tracheal ring in the midline until air was aspirated and it was visualized endoscopically.  A wire was then threaded into the trachea cephalad.  A small vertical incision was made adjacent to the wire through the skin only.  The dilator was then introduced into the trachea.  Subsequently, a tracheostomy tube was inserted over the obturator and the position confirmed and the wire and obturator were removed and the cuff inflated.  The ventilator circuit was then switched from the endotracheal tube to the tracheostomy and adequate ventilation assured.  The endotracheal tube cuff was then deflated and the endotracheal tube removed.  The entire procedure was monitored with ventilator checks, vital signs, ECG, pulse oximetry and endoscopy.  No complications were witnessed. Minimal blood loss noted. Follow up chest x-ray ordered.

## 2024-10-21 NOTE — PROGRESS NOTE ADULT - ASSESSMENT
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  large pontine hemorrhage, SAH, brain edema  CVA  acute MI  acute on chronic CKD, hydronephrosis    PLAN:   NEURO: neurochecks q1h, PRN pain meds with acetaminophen  neurostim: sinemet / amantadine  palliative / ethics follow-up  VEEG neg  REHAB:  physical therapy evaluation and management    EARLY MOB:      PULM:  s/p trach, cont full vent support and CPAP weaning as tolerated, pulmo toilette  CARDIO:  SBP goal 100-160mm Hg, labetalol / hydralazine, amlodipine  ENDO:  Blood sugar goals 140-180 mg/dL, A1C 5.4  GI:  PPI for GI prophylaxis; PEG plans, bowel regimen  DIET: TF at goal, NPO after MN for PEG  RENAL:  Na goal 135-145, Vuong, tamsulosin / doxazosin  HEM/ONC:   VTE Prophylaxis: SCDs, SQH  ID: afebrile, no leukocytosis; on Unasyn for A. baumanii, MSSA+  Social: will update family  MISC: palliative discussions with family     Active issues:  What's keeping patient in the ICU? vented  What is this patient's dispo plan?    ATTENDING ATTESTATION:  I was physically present for the key portions of the evaluation and management (E/M) service provided.  I agree with the above history, physical and plan, which I have reviewed and edited where appropriate.    Patient at high risk for neurological deterioration or death due to:  ICU delirium, aspiration PNA, DVT / PE.  Critical care time:  I have personally provided 60 minutes of critical care time, excluding time spent on separate procedures.      Plan discussed with RN, house staff. 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  large pontine hemorrhage, SAH, brain edema; ?locked-in  CVA  acute MI  acute on chronic CKD, hydronephrosis    PLAN:   NEURO: neurochecks q4h, VSq1 PRN pain meds with acetaminophen; fentnayl for PEG  neurostim: sinemet / amantadine  palliative / ethics follow-up  off VEEG - neg  REHAB:  physical therapy evaluation and management    EARLY MOB:      PULM:  s/p trach, cont full vent support and CPAP weaning as tolerated, pulmo toilette  CARDIO:  SBP goal 100-160mm Hg, labetalol / hydralazine, amlodipine; decrease labetalol  ENDO:  Blood sugar goals 140-180 mg/dL, A1C 5.4  GI:  PPI for GI prophylaxis; PEG plans, bowel regimen  DIET: NPO for PEG  RENAL:  Na goal 135-145, Vuong, tamsulosin / increase doxazosin to 4; IVF while NPO; TOV; decrease FW to 100 q6h, continue phosphate binder and Lokelma  HEM/ONC:  Hb stable  VTE Prophylaxis: SCDs, SQH  ID: afebrile, no leukocytosis; on Unasyn for A. baumanii, MSSA+ (last day 10/23)  Social: will update family  MISC: palliative discussions with family     Active issues:  What's keeping patient in the ICU? vented  What is this patient's dispo plan? wean to trach collar and stepdown vs if unable to - will stepdown to vent bed    ATTENDING ATTESTATION:  I was physically present for the key portions of the evaluation and management (E/M) service provided.  I agree with the above history, physical and plan, which I have reviewed and edited where appropriate.    Patient at high risk for neurological deterioration or death due to:  ICU delirium, aspiration PNA, DVT / PE.  Critical care time:  I have personally provided 60 minutes of critical care time, excluding time spent on separate procedures.      Plan discussed with RN, house staff. 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  large pontine hemorrhage, SAH, brain edema; ?locked-in  CVA  h/o MI  acute on chronic CKD, hydronephrosis    PLAN:   NEURO: neurochecks q4h, VSq1 PRN pain meds with acetaminophen; fentnayl for PEG  neurostim: sinemet / amantadine  palliative / ethics follow-up  off VEEG - neg  REHAB:  physical therapy evaluation and management    EARLY MOB:      PULM:  s/p trach, cont full vent support and CPAP weaning as tolerated, pulmo toilette  CARDIO:  SBP goal 100-160mm Hg, labetalol / hydralazine, amlodipine; decrease labetalol  ENDO:  Blood sugar goals 140-180 mg/dL, A1C 5.4  GI:  PPI for GI prophylaxis; PEG plans, bowel regimen  DIET: NPO for PEG  RENAL:  Na goal 135-145, Vuong, tamsulosin / increase doxazosin to 4; IVF while NPO; TOV; decrease FW to 100 q6h, continue phosphate binder and Lokelma  HEM/ONC:  Hb stable  VTE Prophylaxis: SCDs, SQH  ID: afebrile, no leukocytosis; on Unasyn for A. baumanii, MSSA+ (last day 10/23)  Social: will update family  MISC: palliative discussions with family     Active issues:  What's keeping patient in the ICU? vented  What is this patient's dispo plan? wean to trach collar and stepdown vs if unable to - will stepdown to vent bed    ATTENDING ATTESTATION:  I was physically present for the key portions of the evaluation and management (E/M) service provided.  I agree with the above history, physical and plan, which I have reviewed and edited where appropriate.    Patient at high risk for neurological deterioration or death due to:  ICU delirium, aspiration PNA, DVT / PE.  Critical care time:  I have personally provided 60 minutes of critical care time, excluding time spent on separate procedures.      Plan discussed with RN, house staff.

## 2024-10-21 NOTE — PROGRESS NOTE ADULT - SUBJECTIVE AND OBJECTIVE BOX
Procedure: PEG placement   Surgeon: Dr. Trinh     S: Pt is nonverbal or arousal s/p brainstem blled, as is baseline. Unable to assess subjective information.     O:  T(C): 37.1 (10-21-24 @ 09:01), Max: 37.1 (10-21-24 @ 09:01)  T(F): 98.8 (10-21-24 @ 09:01), Max: 98.8 (10-21-24 @ 09:01)  HR: 74 (10-21-24 @ 11:00) (74 - 84)  BP: --  RR: 16 (10-21-24 @ 11:00) (9 - 19)  SpO2: 100% (10-21-24 @ 11:00) (97% - 100%)  Wt(kg): --                        8.9    7.39  )-----------( 408      ( 21 Oct 2024 04:38 )             29.1     10-21    137  |  108  |  48[H]  ----------------------------<  106[H]  5.2   |  20[L]  |  3.40[H]    Ca    8.7      21 Oct 2024 04:38  Phos  5.1     10-21  Mg     2.2     10-21        PHYSICAL EXAM:  General: NAD,  Pulm: No respiratory distress, nonlabored breathing, trach in place   CVS: NSR, HDS  Abd: Soft, NT, ND        PEG tube bumper at 3.5cm, insertion clean, dry, and intact   Extremities: WWP, no edema        A/P: 44yMale s/p PEG tube insertion     - NPO with meds through PEG for 24 hours  - Trickle feeds can begin after 24 hours

## 2024-10-21 NOTE — PROGRESS NOTE ADULT - SUBJECTIVE AND OBJECTIVE BOX
SUBJECTIVE: 44 male, with PMHx of stroke and MI as reported by patients coworkes, he presented to [facility] with AMS, Pt was working at [employer] when he was found down by coworkers. EMS called and pt brought to [facility] ED. Intubated, sedated, started on cardene for SBPs in 200s. CT head showed brain stem bleed. Transferred to NSICU on 09/30/24 for further management of the pontine hemorrhage with SAH extension. Patient currently spontaneously breathing with CPAP, neuro exam slightly improving with hopes of possible partial recovery although very limited. Patients surrogate decision maker (Olivier), the patients son, had an extensive discussiong with Dr. Davison about GOC and son wants a trachoestomy and a PEG placement to prolong his life      MEDICATIONS  (STANDING):  acetylcysteine 10%  Inhalation 4 milliLiter(s) Inhalation every 12 hours  albuterol/ipratropium for Nebulization 3 milliLiter(s) Nebulizer every 12 hours  amantadine 100 milliGRAM(s) Oral <User Schedule>  amLODIPine   Tablet 10 milliGRAM(s) Oral daily  ampicillin/sulbactam  IVPB 3 Gram(s) IV Intermittent <User Schedule>  carbidopa/levodopa  25/100 1 Tablet(s) Oral <User Schedule>  chlorhexidine 2% Cloths 1 Application(s) Topical <User Schedule>  doxazosin 2 milliGRAM(s) Oral at bedtime  erythromycin   Ointment 1 Application(s) Right EYE every 6 hours  heparin   Injectable 5000 Unit(s) SubCutaneous every 8 hours  hydrALAZINE 25 milliGRAM(s) Oral every 8 hours  labetalol 100 milliGRAM(s) Oral every 8 hours  pantoprazole  Injectable 40 milliGRAM(s) IV Push daily  petrolatum Ophthalmic Ointment 1 Application(s) Both EYES two times a day  polyethylene glycol 3350 17 Gram(s) Oral two times a day  senna 2 Tablet(s) Oral at bedtime  sodium chloride 0.9%. 1000 milliLiter(s) (80 mL/Hr) IV Continuous <Continuous>  tamsulosin 0.8 milliGRAM(s) Oral at bedtime    MEDICATIONS  (PRN):  acetaminophen   Oral Liquid .. 650 milliGRAM(s) Oral every 6 hours PRN Temp greater or equal to 38C (100.4F)  bisacodyl Suppository 10 milliGRAM(s) Rectal daily PRN Constipation  oxyCODONE    IR 5 milliGRAM(s) Oral every 4 hours PRN Mild Pain (1 - 3)      Vital Signs Last 24 Hrs  T(C): 37.4 (21 Oct 2024 04:26), Max: 37.5 (21 Oct 2024 00:10)  T(F): 99.3 (21 Oct 2024 04:26), Max: 99.5 (21 Oct 2024 00:10)  HR: 80 (21 Oct 2024 07:00) (73 - 89)  BP: --  BP(mean): --  RR: 16 (21 Oct 2024 07:00) (10 - 24)  SpO2: 100% (21 Oct 2024 07:00) (95% - 100%)    Parameters below as of 21 Oct 2024 08:00  Patient On (Oxygen Delivery Method): ventilator    O2 Concentration (%): 40    Physical Exam  General: AAOx3, NAD, laying comfortably in bed  Cardio: S1,S2, No MRG  Pulm: Nonlabored breathing  Abdomen: soft, non-distended, non-tender, no scars noted. Has a rectal tube in place and is making BMs.   Extremities: WWP, peripheral pulses appreciated                I&O's Detail    20 Oct 2024 07:01  -  21 Oct 2024 07:00  --------------------------------------------------------  IN:    Enteral Tube Flush: 430 mL    Free Water: 600 mL    IV PiggyBack: 600 mL    Jevity 1.5: 520 mL    sodium chloride 0.9%: 640 mL  Total IN: 2790 mL    OUT:    Indwelling Catheter - Urethral (mL): 1980 mL  Total OUT: 1980 mL    Total NET: 810 mL      21 Oct 2024 07:01  -  21 Oct 2024 07:59  --------------------------------------------------------  IN:    sodium chloride 0.9%: 80 mL  Total IN: 80 mL    OUT:    Indwelling Catheter - Urethral (mL): 175 mL    Jevity 1.5: 0 mL  Total OUT: 175 mL    Total NET: -95 mL          LABS:                        8.9    7.39  )-----------( 408      ( 21 Oct 2024 04:38 )             29.1     10-21    137  |  108  |  48[H]  ----------------------------<  106[H]  5.2   |  20[L]  |  3.40[H]    Ca    8.7      21 Oct 2024 04:38  Phos  5.1     10-21  Mg     2.2     10-21      PT/INR - ( 21 Oct 2024 04:38 )   PT: 12.3 sec;   INR: 1.05          PTT - ( 21 Oct 2024 04:38 )  PTT:31.6 sec  Urinalysis Basic - ( 21 Oct 2024 04:38 )    Color: x / Appearance: x / SG: x / pH: x  Gluc: 106 mg/dL / Ketone: x  / Bili: x / Urobili: x   Blood: x / Protein: x / Nitrite: x   Leuk Esterase: x / RBC: x / WBC x   Sq Epi: x / Non Sq Epi: x / Bacteria: x        RADIOLOGY & ADDITIONAL STUDIES:

## 2024-10-21 NOTE — PROGRESS NOTE ADULT - SUBJECTIVE AND OBJECTIVE BOX
=================================  NEUROCRITICAL CARE ATTENDING NOTE  =================================    GARETT DELEON   MRN-[id number]  Summary:  44y/M  unknown past medical history (reported stroke and MI by coworkers) presented to [facility] with AMS, Pt was working at [employer] when he was found down by coworkers. EMS called and pt brought to [facility] ED. Intubated, sedated, started on cardene for SBPs in 200s. CT head showed brain stem bleed. Transferred to NSICU for further management.  (30 Sep 2024 12:55)    COURSE IN THE HOSPITAL:    Date	POD	BD	Events	Surgery  	-	-	Transferred from [facility]. A-line placed. Versed discontinued. Hydralazine, 3% saline started. Changed propofol to precedex.	-  10/1	-	-	Stability CTH done. Added labetalol, started TF. Palliative and ethics consulted. Febrile 103°F, pan cultures sent.	-  10/2	-		Desaturation to 80s, FiO2 increased. Fentanyl given, ABG, CXR ordered. Propofol started. Vanc/CTX started. MRSA negative, vanc discontinued. Na 159, starting FW 250q6.	-  10/3	-	3	Na increasing, FW increased to 300q6. Bowel regimen discontinued. vEEG started.	-  10/4	-		Albumin bolus, increased LR. 7% hypersal started for 48hrs. Nephrology consulted for CKD. SBP 170s, hydralazine given.	-  10/5	-		Hydralazine and labetalol for SBP. RT placed for diarrhea.	-  10/6	-		FW increased to 350q4. IV Tylenol for temp 100.6°F.	-  10/7	-		Pancultured for temp 101.8°F.	-  10/8	-		Cr increase, LR decreased. Simethicone added. Increased hydralazine, labetalol. Na 145, FW 250q6. Retaining fluid, bladder scans ordered.	-  10/9	-		Abd x-ray for distention. Changed tube feeds to Jevity 1.5. Tolerating CPAP.	-  10/10	-	10	Vuong for urinary retention. Increased TF to goal. Family leaning toward trach/PEG.	-  10/11	-		Trach/PEG consults placed.	-  10/12	-		MRI brain complete.	-  10/13	-	13	Increased flomax. Hold SQH for trach.	-  10/14	-		S/p trach with pulmonary. NGT placed, CXR confirmed position.	-  10/15	-	15	Resumed feeds. Spiked fever 101°F, pan cultures sent.	-  10/16	-	16	Lokelma for K+ 5.4. Started vanc/zosyn for PNA. PEG held for fever.	-  10/17	-	17	Serum and urine osm ordered. Started sinemet, FW 100q4, IVF discontinued. MRSA negative, vanc discontinued. NGT replaced.	-  10/18	-	18	Amantadine added. Changed zosyn to unasyn for acinetobacter baumannii. Failed TOV, required SC.	-  10/19	-	19	Cardura added for retention. Decreased labetalol, hydralazine. Vuong replaced.	-  10/20	-	20	NGT dislodged, replaced. PEG scheduled for tomorrow. Lokelma for hyperkalemia.	-  10/21	-	21	-    Past Medical History: Acute myocardial infarction CVA (cerebral vascular accident)  Allergies:  Allergy Status Unknown  Home meds:     PHYSICAL EXAMINATION  T(C): 37.4 (10-21 @ 04:26), Max: 37.5 (10-21 @ 00:10) HR: 80 (10-21 @ 07:00) (73 - 89) BP: -- RR: 16 (10-21 @ 07:00) (10 - 24) SpO2: 100% (10-21 @ 07:00) (95% - 100%)   NEUROLOGIC EXAMINATION:  Patient is    GENERAL: not intubated, not in cardiorespiratory distress  EENT:  anicteric  CARDIOVASCULAR: (+) S1 S2, normal rate and regular rhythm  PULMONARY: clear to auscultation bilaterally  ABDOMEN: soft, nontender with normoactive bowel sounds  EXTREMITIES: no edema  SKIN: no rash    LABS:  CAPILLARY BLOOD GLUCOSE 110 112               8.9    7.39  )-----------( 408      ( 21 Oct 2024 04:38 )             29.1      137  |  108  |  48[H]  ----------------------------<  106[H]  5.2   |  20[L]  |  3.40[H]    Ca    8.7      21 Oct 2024 04:38  Phos  5.1     10-21  Mg     2.2     10-21    10-20 @ 07:01  -  10-21 @ 07:00  --------------------------------------------------------  IN: 2790 mL / OUT: 1980 mL / NET: 810 mL    10-21 @ 07:01  -  10-21 @ 07:39  --------------------------------------------------------  IN: 80 mL / OUT: 0 mL / NET: 80 mL    Bacteriology:  CSF studies:  EEG:  Neuroimagin2024 9:30 AM	CT Brain	Large pontine hemorrhage, SAH, edema, no hydrocephalus. Multiple old infarcts.  2024 1:11 PM	CTA Head/Neck	Pontine hemorrhage stable, no malformation, stable ventricular size, no stenosis or aneurysm.  2024 6:40 PM	CT Brain	Enlarged pontine hemorrhage, SAH in frontal lobes.  2024        	CT Brain	Stable pontine hemorrhage, SAH stable.  10/03/2024 11:06 AM	CT Brain	Stable hematoma, no rebleeding.  10/12/2024 9:16 AM	MRI Brain	Stable hemorrhage, no new bleeding, cerebellar infarct.    Other imaging:  Date/Time	Type of Study	Results  10/01/2024 11:49 AM	US Retroperit	No hydronephrosis; chronic renal disease; Vuong catheter.  10/01/2024 11:14 AM	XR Abdomen	Orogastric tube coiled in stomach; nonspecific bowel gas pattern.  10/07/2024 10:25 PM	XR Abdomen	Nonspecific bowel gas pattern.  10/08/2024 8:01 AM	US Retroperit	Normal kidneys; distended bladder; mild hydronephrosis.  10/10/2024 7:32 AM	XR Abdomen	Nasogastric tube; nonspecific bowel gas pattern.  10/19/2024 1:13 PM	US DPLX Veins	No DVT in lower extremities.    MEDICATIONS: 10-21    ·	heparin   Injectable 5000 SubCutaneous every 8 hours  ·	ampicillin/sulbactam  IVPB 3 IV Intermittent <User Schedule>  ·	amantadine 100 Oral <User Schedule>  ·	carbidopa/levodopa  25/100 1 Oral <User Schedule>  ·	amLODIPine   Tablet 10 milliGRAM(s) Oral daily  ·	doxazosin 2 milliGRAM(s) Oral at bedtime  ·	hydrALAZINE 25 milliGRAM(s) Oral every 8 hours  ·	labetalol 100 milliGRAM(s) Oral every 8 hours  ·	acetylcysteine 10%  Inhalation 4 Inhalation every 12 hours  ·	albuterol/ipratropium for Nebulization 3 Nebulizer every 12 hours  ·	pantoprazole  Injectable 40 IV Push daily  ·	polyethylene glycol 3350 17 Oral two times a day  ·	senna 2 Oral at bedtime  ·	erythromycin   Ointment 1 Right EYE every 6 hours  ·	petrolatum Ophthalmic Ointment 1 Both EYES two times a day  ·	tamsulosin 0.8 Oral at bedtime  ·	acetaminophen   Oral Liquid .. 650 Oral every 6 hours PRN  ·	bisacodyl Suppository 10 Rectal daily PRN  ·	oxyCODONE    IR 5 Oral every 4 hours PRN    IV FLUIDS:  DRIPS:  DIET:  Lines:  Drains:    10-20-24 @ 07:01  -  10-21-24 @ 07:00  --------------------------------------------------------  OUT:    Indwelling Catheter - Urethral (mL): 1980 mL  Total OUT: 1980 mL        Wounds:    CODE STATUS:  Full Code                       GOALS OF CARE:  aggressive                      DISPOSITION:  ICU =================================  NEUROCRITICAL CARE ATTENDING NOTE  =================================    GARETT DELEON   MRN-[id number]  Summary:  44y/M  unknown past medical history (reported stroke and MI by coworkers) presented to [facility] with AMS, Pt was working at [employer] when he was found down by coworkers. EMS called and pt brought to [facility] ED. Intubated, sedated, started on cardene for SBPs in 200s. CT head showed brain stem bleed. Transferred to NSICU for further management.  (30 Sep 2024 12:55)    COURSE IN THE HOSPITAL:    Date	POD	BD	Events	Surgery  	-	-	Transferred from [facility]. A-line placed. Versed discontinued. Hydralazine, 3% saline started. Changed propofol to precedex.	-  10/1	-	-	Stability CTH done. Added labetalol, started TF. Palliative and ethics consulted. Febrile 103°F, pan cultures sent.	-  10/2	-		Desaturation to 80s, FiO2 increased. Fentanyl given, ABG, CXR ordered. Propofol started. Vanc/CTX started. MRSA negative, vanc discontinued. Na 159, starting FW 250q6.	-  10/3	-	3	Na increasing, FW increased to 300q6. Bowel regimen discontinued. vEEG started.	-  10/4	-		Albumin bolus, increased LR. 7% hypersal started for 48hrs. Nephrology consulted for CKD. SBP 170s, hydralazine given.	-  10/5	-		Hydralazine and labetalol for SBP. RT placed for diarrhea.	-  10/6	-		FW increased to 350q4. IV Tylenol for temp 100.6°F.	-  10/7	-		Pancultured for temp 101.8°F.	-  10/8	-		Cr increase, LR decreased. Simethicone added. Increased hydralazine, labetalol. Na 145, FW 250q6. Retaining fluid, bladder scans ordered.	-  10/9	-		Abd x-ray for distention. Changed tube feeds to Jevity 1.5. Tolerating CPAP.	-  10/10	-	10	Vuong for urinary retention. Increased TF to goal. Family leaning toward trach/PEG.	-  10/11	-		Trach/PEG consults placed.	-  10/12	-		MRI brain complete.	-  10/13	-	13	Increased flomax. Hold SQH for trach.	-  10/14	-		S/p trach with pulmonary. NGT placed, CXR confirmed position.	-  10/15	-	15	Resumed feeds. Spiked fever 101°F, pan cultures sent.	-  10/16	-	16	Lokelma for K+ 5.4. Started vanc/zosyn for PNA. PEG held for fever.	-  10/17	-	17	Serum and urine osm ordered. Started sinemet, FW 100q4, IVF discontinued. MRSA negative, vanc discontinued. NGT replaced.	-  10/18	-	18	Amantadine added. Changed zosyn to unasyn for acinetobacter baumannii. Failed TOV, required SC.	-  10/19	-	19	Cardura added for retention. Decreased labetalol, hydralazine. Vuong replaced.	-  10/20	-	20	NGT dislodged, replaced. PEG scheduled for tomorrow. Lokelma for hyperkalemia.	-  10/21	-	21	-    Past Medical History: Acute myocardial infarction CVA (cerebral vascular accident)  Allergies:  Allergy Status Unknown  Home meds:     PHYSICAL EXAMINATION  T(C): 37.4 (10-21 @ 04:26), Max: 37.5 (10-21 @ 00:10) HR: 80 (10-21 @ 07:00) (73 - 89) BP: 110s to 140s RR: 16 (10-21 @ 07:00) (10 - 24) SpO2: 100% (10-21 @ 07:00) (95% - 100%)   NEUROLOGIC EXAMINATION:  Patient is L eye opens spontaneously, ?follows commands (look up and down), does not track. (+) L corneal, weak R corneal, unable to close R eyelid, withdraws B UE, TF B LE  GENERAL: trached CPAP 10/5 40%  EENT:  anicteric  CARDIOVASCULAR: (+) S1 S2, normal rate and regular rhythm  PULMONARY: clear to auscultation bilaterally (q2h suctioning, thick copious)  ABDOMEN: soft, nontender with normoactive bowel sounds  EXTREMITIES: no edema  SKIN: no rash    LABS:  CAPILLARY BLOOD GLUCOSE 110 112               8.9    7.39  )-----------( 408      ( 21 Oct 2024 04:38 )             29.1      137  |  108  |  48[H]  ----------------------------<  106[H]  5.2   |  20[L]  |  3.40[H]    Ca    8.7      21 Oct 2024 04:38  Phos  5.1     10-21  Mg     2.2     10-21    10-20 @ 07:01  -  10-21 @ 07:00  --------------------------------------------------------  IN: 2790 mL / OUT: 1980 mL / NET: 810 mL    10-21 @ 07:01  -  10-21 @ 07:39  --------------------------------------------------------  IN: 80 mL / OUT: 0 mL / NET: 80 mL    Bacteriology:  10/16 sputum culture acinetobacter  10/15 Blood CS [id number]    CSF studies:  EEG:  Neuroimagin2024 9:30 AM	CT Brain	Large pontine hemorrhage, SAH, edema, no hydrocephalus. Multiple old infarcts.  2024 1:11 PM	CTA Head/Neck	Pontine hemorrhage stable, no malformation, stable ventricular size, no stenosis or aneurysm.  2024 6:40 PM	CT Brain	Enlarged pontine hemorrhage, SAH in frontal lobes.  2024        	CT Brain	Stable pontine hemorrhage, SAH stable.  10/03/2024 11:06 AM	CT Brain	Stable hematoma, no rebleeding.  10/12/2024 9:16 AM	MRI Brain	Stable hemorrhage, no new bleeding, cerebellar infarct.    Other imaging:  Date/Time	Type of Study	Results  10/01/2024 11:49 AM	US Retroperit	No hydronephrosis; chronic renal disease; Vuong catheter.  10/01/2024 11:14 AM	XR Abdomen	Orogastric tube coiled in stomach; nonspecific bowel gas pattern.  10/07/2024 10:25 PM	XR Abdomen	Nonspecific bowel gas pattern.  10/08/2024 8:01 AM	US Retroperit	Normal kidneys; distended bladder; mild hydronephrosis.  10/10/2024 7:32 AM	XR Abdomen	Nasogastric tube; nonspecific bowel gas pattern.  10/19/2024 1:13 PM	US DPLX Veins	No DVT in lower extremities.    MEDICATIONS: 10-21    ·	heparin   Injectable 5000 SubCutaneous every 8 hours  ·	ampicillin/sulbactam  IVPB 3 IV Intermittent <User Schedule>  ·	erythromycin   Ointment 1 Right EYE every 6 hours  ·	amantadine 100 Oral <User Schedule>  ·	carbidopa/levodopa  25/100 1 Oral <User Schedule>  ·	amLODIPine   Tablet 10 milliGRAM(s) Oral daily  ·	doxazosin 2 milliGRAM(s) Oral at bedtime  ·	hydrALAZINE 25 milliGRAM(s) Oral every 8 hours  ·	labetalol 100 milliGRAM(s) Oral every 8 hours  ·	acetylcysteine 10%  Inhalation 4 Inhalation every 12 hours  ·	albuterol/ipratropium for Nebulization 3 Nebulizer every 12 hours  ·	pantoprazole  Injectable 40 IV Push daily  ·	polyethylene glycol 3350 17 Oral two times a day  ·	senna 2 Oral at bedtime  ·	petrolatum Ophthalmic Ointment 1 Both EYES two times a day  ·	tamsulosin 0.8 Oral at bedtime  ·	acetaminophen   Oral Liquid .. 650 Oral every 6 hours PRN  ·	bisacodyl Suppository 10 Rectal daily PRN  ·	oxyCODONE    IR 5 Oral every 4 hours PRN    IV FLUIDS: NS@80  DRIPS:  DIET: NPO  Lines: Katt  Drains:  Vuong   Wounds:    CODE STATUS:  Full Code                       GOALS OF CARE:  aggressive                      DISPOSITION:  ICU  ICH Score 3

## 2024-10-21 NOTE — PROGRESS NOTE ADULT - SUBJECTIVE AND OBJECTIVE BOX
HPI:  Unknown age male, unknown past medical history (reported stroke and MI by coworkers) presented to [facility] with AMS, Pt was working at [employer] when he was found down by coworkers. EMS called and pt brought to [facility] ED. Intubated, sedated, started on cardene for SBPs in 200s. CT head showed brain stem bleed. Transferred to NSICU for further management.  (30 Sep 2024 12:55)    INTERVAL EVENTS:  GEORGE    HOSPITAL COURSE:  9/30: transferred from [facility]. A line placed. Versed dc'd. Cy Rader at bedside, states pt has family in [place], cannot confirm medications or PMH other than stroke and MI. 250cc bolus 3% given. LR switched to NS. hydralazine 25q8 started, 3% started, switched propofol to precedex   10/1: stability CTH done. Added labetalol, started TF. Palliative consulted. ethics consulted to determine surrogate. febrile 103, pan cx sent  10/2: BD 2, GEORGE overnight. TF resumed. Desatt'd to 80s, FiO2 inc. to 50. Fentanyl given, ABG, CXR ordered. Maxxed on precedex, started on propofol for DARIEN -4 - -5. Precedex dc'd. Duonebs, mucomyst, hypertonic added. 3% dc'd. Cardene dc'd. Start vanc/CTX. Increased labetalol 200q8. MRSA negative, dc'd vanc. ETT pulled back 2cm x 2, good positioning after confirmatory chest xray. Ethics attempting to establish HCP with family. Na 159, starting FW 250q6 for range 150-155.   10/3: BD3, GEORGE o/n, neuro stable. Na elevating, FW increased to 300q6. Dc'd bowel reg for diarrhea. vEEG started. SQH 5000q8 tonight.   10/4: BD 4, albumn bolus, incr. LR to 80 2/2 incr. in Cr, LR to 100cc/hr for uptrending Cr. Started 7% hypersal for 48hrs and SL atropine for inline/oral thick secretions. Dc'd CTX and started ancef for MSSA in the sputum. Nephrology consulted for CKD, f/u recs. SBP 170s, given hydralazine 10mg IVP.   10/5: BD5, o/n 10mg IVP hydralazine given for SBP 170s and started on hydralazine 25q8 via OGT. 10mg IV push labetalol for SBP > 160s. RT placed for diarrhea.   10/6: BD6, o/n FW increased to 350q4 per nephrology recs. IV tylenol for temp 100.6, SBp 160s presumed uncomfortable.   10/7: BD7, overnight pancultured for temp 101.8F.   10/8: BD8. GEORGE. Cr bumped. decreased LR to 75cc/hr. Adding simethicone ATC. incr hydralazine 50mgTID. Incr labetalol 300mgTID. Na 145, decreased FWF to 250q6. Start precedex. FENa consistent with intrinsic kidney injury. Pend repeat renal US. Retaining up to 1.3L, bladder scans q6, straight cath PRN  10/9: BD 9. GEORGE overnight. Neuro stable. abd xray for distention w non-specific gas pattern, OGT to LIWS for morning. duonebs/mucomyst to q8 for improving secretions. Changed tube feeds to Jevity 1.5 20cc/hr, low rate due to abdominal distention, nepro dense and more difficult to digest. Tolerating CPAP, confirmed by ABG.   10/10: BD 10. GEORGE overnight. Neuro stable. (+) gabriel for urinary retention on bladder scan. inc TF to goal rate of 40cc/hr. family leaning toward pursuing trach/PEG. 1/2 amp for FS 81.   10/11: BD 11. GEORGE overnight. Neuro stable. Trach/PEG consults placed.   10/12: BD 12. GEORGE overnight. Neuro stable. MRI brain complete.   10/13: BD 13. Increase flomax. Hold SQH after PM dose for trach tm. IVL.   10/14: BD 14. GEORGE overnight, remains on AC/VC. Gabriel placed for urinary retention. Dc'd free water.  S/p trach with pulm. NGT placed and CXR confirmed in good position.   10/15: BD 15, GEORGE ovn. resumed feeds. spiked 101, pan cx sent.   10/16: BD 16. GEORGE ovn. Lokelma 5mg for K+ 5.4. Started vanc q 24/zosyn for empiric PNA coverage, IVF to 100/hr. PEG held for fever.   10/17: BD 17,  ordered serum osm and urine osm for am. Started sinemet for neurostimulation. Increased cardura to 0.8. Started FW 100q4, dc'd IVF. MRSA negative, dc'd vanc. NGT replaced d/t coiling.   10/18: BD 18, GEORGE overnight, neuro stable. Amantadine added for neurostim. zosyn changed to unasyn for acinetobacter baumannii, failed TOV and required SC  10/19: BD 19, GEORGE ovn. cardura 2mg added for retention. labetalol decreased 200q8, hydralazine decreased 25q8. Gabriel replaced.   10/20: BD20, GEORGE overnight. NGT dislodged, replaced. PEG tomorrow w/ gen surg, FW increased to 150q4 and labetalol decreased to 100q8, lokelma given for hyperkalemia.   10/21: BD 21.       Vital Signs Last 24 Hrs  T(C): 37.5 (21 Oct 2024 00:10), Max: 37.5 (21 Oct 2024 00:10)  T(F): 99.5 (21 Oct 2024 00:10), Max: 99.5 (21 Oct 2024 00:10)  HR: 84 (21 Oct 2024 00:00) (73 - 89)  BP: --  BP(mean): --  RR: 13 (21 Oct 2024 00:00) (10 - 24)  SpO2: 97% (21 Oct 2024 00:00) (97% - 100%)    Parameters below as of 21 Oct 2024 01:00  Patient On (Oxygen Delivery Method): ventilator    O2 Concentration (%): 40    I&O's Summary    19 Oct 2024 07:01  -  20 Oct 2024 07:00  --------------------------------------------------------  IN: 1600 mL / OUT: 1635 mL / NET: -35 mL    20 Oct 2024 07:01  -  21 Oct 2024 00:18  --------------------------------------------------------  IN: 2010 mL / OUT: 1220 mL / NET: 790 mL        PHYSICAL EXAM:  Constitutional: Lying in bed.   HEENT: R eye taped shut. L pupil, round, reactive to light. Face symmetric, tongue midline.   Respiratory: +Trach to vent, No respiratory distress, lungs clear to auscultation bilaterally.   Cardiovascular: RRR, S1, S2.   Gastrointestinal: +NGT, abdomen soft, nondistended.  Neurological:  AAOX0, eyes open spontaneously. does not follow commands, looks up/down.   Motor: RUE extends, RLE TF, LUE w/d, LLE w/d  Extremities: Warm, well perfused.        LABS:                        9.1    6.29  )-----------( 412      ( 20 Oct 2024 05:30 )             29.4     10-20    138  |  106  |  51[H]  ----------------------------<  99  5.2   |  20[L]  |  3.39[H]    Ca    9.0      20 Oct 2024 09:29  Phos  4.8     10-20  Mg     2.3     10-20        Urinalysis Basic - ( 20 Oct 2024 09:29 )    Color: x / Appearance: x / SG: x / pH: x  Gluc: 99 mg/dL / Ketone: x  / Bili: x / Urobili: x   Blood: x / Protein: x / Nitrite: x   Leuk Esterase: x / RBC: x / WBC x   Sq Epi: x / Non Sq Epi: x / Bacteria: x          CAPILLARY BLOOD GLUCOSE      POCT Blood Glucose.: 112 mg/dL (20 Oct 2024 23:11)      Drug Levels: [] N/A    CSF Analysis: [] N/A      Allergies    Allergy Status Unknown    Intolerances      MEDICATIONS:  Antibiotics:  ampicillin/sulbactam  IVPB 3 Gram(s) IV Intermittent <User Schedule>    Neuro:  acetaminophen   Oral Liquid .. 650 milliGRAM(s) Oral every 6 hours PRN  amantadine 100 milliGRAM(s) Oral <User Schedule>  carbidopa/levodopa  25/100 1 Tablet(s) Oral <User Schedule>  oxyCODONE    IR 5 milliGRAM(s) Oral every 4 hours PRN    Anticoagulation:  heparin   Injectable 5000 Unit(s) SubCutaneous every 8 hours    OTHER:  acetylcysteine 10%  Inhalation 4 milliLiter(s) Inhalation every 12 hours  albuterol/ipratropium for Nebulization 3 milliLiter(s) Nebulizer every 12 hours  amLODIPine   Tablet 10 milliGRAM(s) Oral daily  bisacodyl Suppository 10 milliGRAM(s) Rectal daily PRN  chlorhexidine 2% Cloths 1 Application(s) Topical <User Schedule>  doxazosin 2 milliGRAM(s) Oral at bedtime  erythromycin   Ointment 1 Application(s) Right EYE every 6 hours  hydrALAZINE 25 milliGRAM(s) Oral every 8 hours  labetalol 100 milliGRAM(s) Oral every 8 hours  pantoprazole  Injectable 40 milliGRAM(s) IV Push daily  petrolatum Ophthalmic Ointment 1 Application(s) Both EYES two times a day  polyethylene glycol 3350 17 Gram(s) Oral two times a day  senna 2 Tablet(s) Oral at bedtime  tamsulosin 0.8 milliGRAM(s) Oral at bedtime    IVF:  sodium chloride 0.9%. 1000 milliLiter(s) IV Continuous <Continuous>    CULTURES:  Culture Results:   Mixed gram negative rods including  Moderate Acinetobacter baumannii/nosocomialis group (10-16 @ 00:13)  Culture Results:   No growth at 5 days (10-15 @ 14:55)    RADIOLOGY & ADDITIONAL TESTS:      ASSESSMENT:  45 y/o PMH ?stroke/MI present to [facility] after collapsing at work. Decorticate posturing, vomiting, intubated for airway protection. 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 Transferred to [facility] for [facility] management. s/p trach 10/14.     Neuro:  - Neuro q4/vitals q1  - pain control: Tylenol prn, oxy prn  - CTH 9/30: enlarged pontine hemorrhage, CTH 10/3 done read stable   - vEEG (10/3-4)- negative, (10/17-10/19) - negative  - stroke core measures, stroke neuro following  - MRI brain w/ w/o contrast 10/12: parenchymal hemorrhage, acute/subacute R cerebellar stroke    - neurostimulation: amantadine, sinemet 25/100 BID    CV:  - SBP <150  - HTN: amlodipine 10 mg qd, labetalol 100 mg q8h, hydralazine 25 mg q8h   - echo (9/30) EF 75%    Resp:  - trach to vent, CPAP as tolerated  - duonebs/mucomyst q12    GI:  - TF via NGT, NPO after midnight for PEG  - bowel reg, LBM 10/17  - GI ppx: protonix while intubated    Renal:  - IVL, FW 150q4  - hyperK 10/20: s/p lokelma 5mg   - Urinary retenion: Flomax 0.8mg, cardura 2mg   - gabriel (10/14-10/18), (10/19 - )  - CKD: trend Cr  - renal US 10/1: echogenicity c/w chronic med renal dz, repeat 10/8: inc renal echogeicity, c/f medical renal disease w increased hydro     Endo:  - A1c 5.4    Heme:  - DVT ppx: SCDs, SQH 5000q8     ID:  - last pan cx 10/15  - 10/15 sputum +actinobacter baumanii, on unasyn (10/18-10/23)  - MSSA swab positive (10/17)  - MRSA swab negative (10/2), sputum MSSA + , s/p 1 dose vanc (10/2), CTX (10/2 - 10/4), Ancef (10/4-10/14)     Dispo: NSICU, full code, pending placement    D/w Dr. D'Amico

## 2024-10-21 NOTE — PROGRESS NOTE ADULT - ASSESSMENT
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 Patient does not have a prior hx of abdominal surgeries, is not currently on any anticoagulation and is only on SQH. Patients son had a long discussion with palliative care regarding goals of care, is understanding the chance of full reocvery is poor, patient's son (SDM) would like to proceed with any measures to prolong his life which includes PEG and trach.       PLAN:  1. Plan to place PEG at bedside today with Dr. Trinh  2. NPO at midnight  3.

## 2024-10-21 NOTE — BRIEF OPERATIVE NOTE - NSICDXBRIEFPREOP_GEN_ALL_CORE_FT
PRE-OP DIAGNOSIS:  Brain stem stroke syndrome 14-Oct-2024 15:12:31  Erick Gonzalez  
PRE-OP DIAGNOSIS:  Brain stem stroke syndrome 14-Oct-2024 15:12:31  Erick Gonzalez

## 2024-10-21 NOTE — PROGRESS NOTE ADULT - SUBJECTIVE AND OBJECTIVE BOX
Neurocritical Care Attending Note     GARETT REYES   MRN-[id number]  Summary:  44y/M  unknown past medical history (reported stroke and MI by coworkers) presented to [facility] with AMS, Pt was working at [employer] when he was found down by coworkers. EMS called and pt brought to [facility] ED. Intubated, sedated, started on cardene for SBPs in 200s. CT head showed brain stem bleed. Transferred to NSICU for further management.  (30 Sep 2024 12:55)    Hospital Course/Interval Events  9/30: transferred from [facility]. A line placed. Versed dc'd. Roomate Prasanth at bedside, states pt has family in [place], cannot confirm medications or PMH other than stroke and MI. 250cc bolus 3% given. LR switched to NS. hydralazine 25q8 started, 3% started, switched propofol to precedex   10/1: stability CTH done. Added labetalol, started TF. Palliative consulted. ethics consulted to determine surrogate. febrile 103, pan cx sent  10/2: BD 2, GEORGE overnight. TF resumed. Desatt'd to 80s, FiO2 inc. to 50. Fentanyl given, ABG, CXR ordered. Maxxed on precedex, started on propofol for DARIEN -4 - -5. Precedex dc'd. Duonebs, mucomyst, hypertonic added. 3% dc'd. Cardene dc'd. Start vanc/CTX. Increased labetalol 200q8. MRSA negative, dc'd vanc. ETT pulled back 2cm x 2, good positioning after confirmatory chest xray. Ethics attempting to establish HCP with family. Na 159, starting FW 250q6 for range 150-155.   10/3: BD3, GEORGE o/n, neuro stable. Na elevating, FW increased to 300q6. Dc'd bowel reg for diarrhea. vEEG started. SQH 5000q8 tonight.   10/4: BD 4, albumn bolus, incr. LR to 80 2/2 incr. in Cr, LR to 100cc/hr for uptrending Cr. Started 7% hypersal for 48hrs and SL atropine for inline/oral thick secretions. Dc'd CTX and started ancef for MSSA in the sputum. Nephrology consulted for CKD, f/u recs. SBP 170s, given hydralazine 10mg IVP.   10/5: BD5, o/n 10mg IVP hydralazine given for SBP 170s and started on hydralazine 25q8 via OGT. 10mg IV push labetalol for SBP > 160s. RT placed for diarrhea.   10/6: BD6, o/n FW increased to 350q4 per nephrology recs. IV tylenol for temp 100.6, SBp 160s presumed uncomfortable.   10/7: BD7, overnight pancultured for temp 101.8F.   10/8: BD8. GEORGE. Cr bumped. decreased LR to 75cc/hr. Adding simethicone ATC. incr hydralazine 50mgTID. Incr labetalol 300mgTID. Na 145, decreased FWF to 250q6. Start precedex. FENa consistent with intrinsic kidney injury. Pend repeat renal US. Retaining up to 1.3L, bladder scans q6, straight cath PRN  10/9: BD 9. GEORGE overnight. Neuro stable. abd xray for distention w non-specific gas pattern, OGT to LIWS for morning. duonebs/mucomyst to q8 for improving secretions. Changed tube feeds to Jevity 1.5 20cc/hr, low rate due to abdominal distention, nepro dense and more difficult to digest. Tolerating CPAP, confirmed by ABG.   10/10: BD 10. GEORGE overnight. Neuro stable. (+) gabriel for urinary retention on bladder scan. inc TF to goal rate of 40cc/hr. family leaning toward pursuing trach/PEG. 1/2 amp for FS 81.   10/11: BD 11. GEORGE overnight. Neuro stable. Trach/PEG consults placed.   10/12: BD 12. GEORGE overnight. Neuro stable. MRI brain complete.   10/13: BD 13. Increase flomax. Hold SQH after PM dose for trach tm. IVL.   10/14: BD 14. GEORGE overnight, remains on AC/VC. Gabriel placed for urinary retention. Dc'd free water.  S/p trach with pulm. NGT placed and CXR confirmed in good position.   10/15: BD 15, GEORGE ovn. resumed feeds. spiked 101, pan cx sent.   10/16: BD 16. GEORGE ovn. Lokelma 5mg for K+ 5.4. Started vanc q 24/zosyn for empiric PNA coverage, IVF to 100/hr. PEG held for fever.   10/17: Na low, ordered serum osm and urine osm for am. Started sinemet for neurostimulation. Increased cardura to 0.8. Started FW 100q4, dc'd IVF.   10/18: BD 18, GEORGE overnight, neuro stable. Amantadine added for neurostim.   10/19: BD 19, GEORGE ovn. cardura 2mg added for retention. labetalol decreased 200q8, hydralazine decreased 25q8. Gabriel replaced.   10/20: BD20, GEORGE overnight. NGT dislodged, replaced.       MEDICATIONS  (STANDING):  acetylcysteine 10%  Inhalation 4 milliLiter(s) Inhalation every 12 hours  albuterol/ipratropium for Nebulization 3 milliLiter(s) Nebulizer every 12 hours  amantadine 100 milliGRAM(s) Oral <User Schedule>  amLODIPine   Tablet 10 milliGRAM(s) Oral daily  ampicillin/sulbactam  IVPB 3 Gram(s) IV Intermittent <User Schedule>  calcium acetate 667 milliGRAM(s) Oral three times a day with meals  carbidopa/levodopa  25/100 1 Tablet(s) Oral <User Schedule>  chlorhexidine 2% Cloths 1 Application(s) Topical <User Schedule>  doxazosin 4 milliGRAM(s) Oral at bedtime  erythromycin   Ointment 1 Application(s) Right EYE every 6 hours  heparin   Injectable 5000 Unit(s) SubCutaneous every 8 hours  hydrALAZINE 25 milliGRAM(s) Oral every 8 hours  labetalol 50 milliGRAM(s) Oral every 8 hours  pantoprazole  Injectable 40 milliGRAM(s) IV Push daily  petrolatum Ophthalmic Ointment 1 Application(s) Both EYES two times a day  polyethylene glycol 3350 17 Gram(s) Oral two times a day  senna 2 Tablet(s) Oral at bedtime  sodium chloride 0.9%. 1000 milliLiter(s) (80 mL/Hr) IV Continuous <Continuous>  sodium zirconium cyclosilicate 5 Gram(s) Oral daily  tamsulosin 0.8 milliGRAM(s) Oral at bedtime    MEDICATIONS  (PRN):  acetaminophen   Oral Liquid .. 650 milliGRAM(s) Oral every 6 hours PRN Temp greater or equal to 38C (100.4F), Mild Pain (1 - 3)  bisacodyl Suppository 10 milliGRAM(s) Rectal daily PRN Constipation  oxyCODONE    IR 5 milliGRAM(s) Oral every 4 hours PRN Mild Pain (1 - 3)      Physical Exam   ICU Vital Signs Last 24 Hrs  T(C): 37.3 (21 Oct 2024 22:16), Max: 37.6 (21 Oct 2024 17:51)  T(F): 99.2 (21 Oct 2024 22:16), Max: 99.7 (21 Oct 2024 17:51)  HR: 84 (21 Oct 2024 22:00) (74 - 93)  BP: --  BP(mean): --  ABP: 138/74 (21 Oct 2024 22:00) (111/64 - 156/83)  ABP(mean): 98 (21 Oct 2024 22:00) (80 - 118)  RR: 12 (21 Oct 2024 22:00) (10 - 21)  SpO2: 99% (21 Oct 2024 22:00) (95% - 100%)  O2 Parameters below as of 21 Oct 2024 22:00  Patient On (Oxygen Delivery Method): ventilator, AC  O2 Concentration (%): 40    Mode: CPAP with PS  FiO2: 40  PEEP: 5  PS: 10  MAP: 7.2  PIP: 15    GA: no apparent distress  HEENT right eye conjunctival injection and corneal abrasion, neck supple, tracheostomy in place   RESP: No respiratory distress, CTA b/l, no WRR  CV: RRR, +S1S2  GI: Soft, NT, ND  Extr: non edematous, peripheral pulses present symmetric   NEURO:   Mental Status: awake/eyes open, maintaining eyes opening spontaneously intermittent downward gaze/ocular bobbing, not  following commands (attempted to ask to look downward or wiggle fingers in [place])  CNs: Disconjugated gaze, left eye some abduction and adduction movements noted, spontaneous vs. purposeful downward intermittent eye movement, pupils equally round and reactive to light right larger than left, corneal + L, trace on right,  cough ++, breathing over vent  Motor: head/neck movement towards noxious, extensor posturing to noxious at the bilateral upper extremities, triple flexion at the bilateral lower extremities     ASSESSMENT/PLAN  43 y/o PMH ?stroke/MI present to [facility] after collapsing at work. Decorticate posturing, vomiting, intubated for airway protection. 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 Transferred to [facility] for [facility] management. s/p trach 10/14. Course complicated by persistent encephalopathy/wakeful unresponsiveness, respiratory failure, VAP.     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   Data   - CTA head and neck 09/30: Re-demonstration of pontine hemorrhage with associated edema and mass effect upon the fourth ventricle and cerebral aqueduct. No underlying no malformation is identified. No increase in ventricular size since the   prior study.  - CT head 10/03: Again noted is a parenchymal hematoma in the nabil, with associated  expansion and surrounding vasogenic edema. There is mild spillage into the subarachnoid spaces at the cistern and within sulci in the right frontal lobe. All of these findings appear stable, without interval rebleeding. There is a chronic hemorrhagic infarct in the left external capsule/corona radiata. There is a small chronic white matter infarct vs small vessel disease in the right corona radiata. Moderate generalized cerebral volume loss, with distention of the sulci and concomitant ex-vacuo ventricular dilatation. Mild nonspecific low attenuation in the periventricular and subcortical white matter. No midline shift or herniation. No CT evidence of acute territorial infarction, although MRI with DWI would be more sensitive. Limited views of the sinuses and mastoids show mild mucosal thickening without air-fluid levels, likely chronic. An ETT is noted in place. Limited views of the orbits and visualized soft tissues of the neck, face, scalp, skull base, and calvarium are otherwise unremarkable. IMPRESSION: No significant change, without interval rebleeding.  - MRI brain 10/12: Similar-appearing evolving acute parenchymal hemorrhage within the nabil with surrounding mass effect and edema. No underlying enhancement. No new areas of acute intracranial hemorrhage. Wedge-shaped acute/subacute infarct within the right lateral cerebellar hemisphere. Unchanged encephalomalacia and gliosis within the left external capsule related to remote hemorrhage in this location.  - vEEG 10/17: with predominant delta slowing with superimposed geo and beta/some reactivity present     Plan  Neuro check q4 and vitals q1  BP goal 100-160, MAP > 65  Sinemet and Amantadine to promote awakening   STAT CT head for any acute neurological change   Off sedation  PRN fentanyl for vent synchrony/ discomfort   Plan for long term vent rehab facility     CV   Diagnosis: Uncontrolled hypertension at presentation; now episodes of hypotension  Data  HR: 84 (21 Oct 2024 22:00) (74 - 93)  ABP: 138/74 (21 Oct 2024 22:00) (111/64 - 156/83)  ABP(mean): 98 (21 Oct 2024 22:00) (80 - 118)  ECHO 9/30 hyperdynamic left ventricular function EF 70%  Plan  SBP goal 100-150, MAP > 65  Labetalol ---> decrease to 100 q8  Continue Hydralazine  25q8  Continue amlodipine 10 mg daily   Telemetry monitoring    PULM   Diagnosis: Intubated for airway protection in the setting of acute encephalopathy; persistent respiratory failure; S/P tracheostomy; VAP,  tolerating PS trials  Data  RR: 12 (21 Oct 2024 22:00) (10 - 21)  SpO2: 99% (21 Oct 2024 22:00) (95% - 100%)  Mode: CPAP with PS  FiO2: 40  PEEP: 5  PS: 10  MAP: 7.2  PIP: 15    Chest x-ray 10/17: Similar to prior exam 10/16/2024 with right lung base partial atelectasis. Nasogastric tube tip below hemidiaphragm. No pneumothorax. No acute infiltrate appearing. Possible minimal right pleural effusion. No left pleural effusion.  Chest x-ray 10/21:  Two views of chest obtained at bedside. NG tube in stomach. Small discoid atelectasis right perihilar region. No pleural effusion.  Cardia mediastinal silhouette unremarkable. Degenerative changes of spine.  IMPRESSION: Small atelectasis right perihilar region is unchanged.  ABG - ( 20 Oct 2024 10:44 )  pH, Arterial: 7.34  pH, Blood: x     /  pCO2: 40    /  pO2: 161   / HCO3: 22    / Base Excess: -3.9  /  SaO2: 98.6      Plan   PS trials --->plan to start  trach collar trial as tolerated   Pulm toilet/chest PT q4/Nebs  pulse-oxymetry monitoring   Antibiotics for VAP    RENAL   Diagnosis: ANIKA on CKD unknown baseline) - - > stable; urinary retention requiring frequent straight cath required Gabriel replacement; intermittent hyperkalemia   Data  10-21    137  |  108  |  48[H]  ----------------------------<  106[H]  5.2   |  20[L]  |  3.40[H]    Ca    8.7      21 Oct 2024 04:38  Phos  5.1     10-21  Mg     2.2     10-21    Plan   Euvolemia/normonatremia goal  KVO  FWF   Monitor for hyperkalemia/monitor I/O  bladder scanner q4 hours + straight cath AK N given urinary retention  Tamsulosin   Doxazosin   Renal following   Avoid nephrotoxic medications    ID  Diagnosis: Febrile, normal white count/ Fever and hypoxia on 10/15 c/w Ventilator Associated PNA ++Acitonobacter B. ++MSSA   Data  ICU Vital Signs Last 24 Hrs  T(C): 37.3 (21 Oct 2024 22:16), Max: 37.6 (21 Oct 2024 17:51)                      8.9    7.39  )-----------( 408      ( 21 Oct 2024 04:38 )             29.1     Blood Cx 10/15: NGTD   Sputum Cx 10/16: Acinetobacter B.   Procalcitonin 10/16: 0.36 <-- 0.13   Plan   - S/P Ancef --> restarted on broad spectrum antibiotics on 10/16 Vancomycin and Zosyn --> given sensitivity narrow to Unasyn (renally dosed) continue for 7-day course (end day 10/23) for VAP  Follow-up cultures results  monitor WBC and fever curve   culture if spikes 38.3C    GI  Diagnosis: Dysphagia secondary to encephalopathy/hemorrhagic stroke pending PEG placement, constipation   Data  Last Bowel Movement: 21-Oct-2024 (10-21-24 @ 09:00)  Plan   NGT ----> PEG planning (10/21 with Surgery)  Diet: enteral feeding at goal  Free water flashes 150 q4 (KVO)  GI prophylaxis while intubated   Bowel regimen + suppository     HEME/ONC  Diagnosis: thrombocytosis   Data               8.9    7.39  )-----------( 408      ( 21 Oct 2024 04:38 )             29.1     LE venous ultrasound 10/19: negative for DVT     Plan   Monitor H&H  Hbg goal > 7.0, PLT > 100  SDCs + heparin SQ for DVT prophylaxis    ENDO  Diagnosis: no active issues   Data  A1c: 5.3  POCT Blood Glucose.: 94 mg/dL (21 Oct 2024 18:37)  POCT Blood Glucose.: 94 mg/dL (21 Oct 2024 12:37)  POCT Blood Glucose.: 110 mg/dL (21 Oct 2024 06:11)    Plan:   Goal euglycemia (-180)  Avoid hypoglycemic episodes      CODE STATUS: FULL CODE  Patient's son (Chauncey Reyes) updated at bedside with . He understands the current clinical condition of his father and would like for him to continue receiving full life sustaining measures.     DISPO:   NeuroICU        Jazlyn Tomlin MD   Neurocritical Care Attending

## 2024-10-22 LAB
ANION GAP SERPL CALC-SCNC: 10 MMOL/L — SIGNIFICANT CHANGE UP (ref 5–17)
ANION GAP SERPL CALC-SCNC: 11 MMOL/L — SIGNIFICANT CHANGE UP (ref 5–17)
BUN SERPL-MCNC: 43 MG/DL — HIGH (ref 7–23)
BUN SERPL-MCNC: 44 MG/DL — HIGH (ref 7–23)
CALCIUM SERPL-MCNC: 9 MG/DL — SIGNIFICANT CHANGE UP (ref 8.4–10.5)
CALCIUM SERPL-MCNC: 9.1 MG/DL — SIGNIFICANT CHANGE UP (ref 8.4–10.5)
CHLORIDE SERPL-SCNC: 110 MMOL/L — HIGH (ref 96–108)
CHLORIDE SERPL-SCNC: 111 MMOL/L — HIGH (ref 96–108)
CO2 SERPL-SCNC: 20 MMOL/L — LOW (ref 22–31)
CO2 SERPL-SCNC: 21 MMOL/L — LOW (ref 22–31)
CREAT SERPL-MCNC: 3.11 MG/DL — HIGH (ref 0.5–1.3)
CREAT SERPL-MCNC: 3.18 MG/DL — HIGH (ref 0.5–1.3)
EGFR: 23 ML/MIN/1.73M2 — LOW
EGFR: 23 ML/MIN/1.73M2 — LOW
EGFR: 24 ML/MIN/1.73M2 — LOW
EGFR: 24 ML/MIN/1.73M2 — LOW
GLUCOSE BLDC GLUCOMTR-MCNC: 86 MG/DL — SIGNIFICANT CHANGE UP (ref 70–99)
GLUCOSE BLDC GLUCOMTR-MCNC: 89 MG/DL — SIGNIFICANT CHANGE UP (ref 70–99)
GLUCOSE SERPL-MCNC: 104 MG/DL — HIGH (ref 70–99)
GLUCOSE SERPL-MCNC: 94 MG/DL — SIGNIFICANT CHANGE UP (ref 70–99)
HCT VFR BLD CALC: 28.6 % — LOW (ref 39–50)
HGB BLD-MCNC: 9 G/DL — LOW (ref 13–17)
MAGNESIUM SERPL-MCNC: 2.1 MG/DL — SIGNIFICANT CHANGE UP (ref 1.6–2.6)
MCHC RBC-ENTMCNC: 29.2 PG — SIGNIFICANT CHANGE UP (ref 27–34)
MCHC RBC-ENTMCNC: 31.5 GM/DL — LOW (ref 32–36)
MCV RBC AUTO: 92.9 FL — SIGNIFICANT CHANGE UP (ref 80–100)
NRBC # BLD: 0 /100 WBCS — SIGNIFICANT CHANGE UP (ref 0–0)
NRBC BLD-RTO: 0 /100 WBCS — SIGNIFICANT CHANGE UP (ref 0–0)
PHOSPHATE SERPL-MCNC: 4.8 MG/DL — HIGH (ref 2.5–4.5)
PLATELET # BLD AUTO: 351 K/UL — SIGNIFICANT CHANGE UP (ref 150–400)
POTASSIUM SERPL-MCNC: 4.9 MMOL/L — SIGNIFICANT CHANGE UP (ref 3.5–5.3)
POTASSIUM SERPL-MCNC: 5.5 MMOL/L — HIGH (ref 3.5–5.3)
POTASSIUM SERPL-SCNC: 4.9 MMOL/L — SIGNIFICANT CHANGE UP (ref 3.5–5.3)
POTASSIUM SERPL-SCNC: 5.5 MMOL/L — HIGH (ref 3.5–5.3)
RBC # BLD: 3.08 M/UL — LOW (ref 4.2–5.8)
RBC # FLD: 12.7 % — SIGNIFICANT CHANGE UP (ref 10.3–14.5)
SODIUM SERPL-SCNC: 140 MMOL/L — SIGNIFICANT CHANGE UP (ref 135–145)
SODIUM SERPL-SCNC: 143 MMOL/L — SIGNIFICANT CHANGE UP (ref 135–145)
WBC # BLD: 6.25 K/UL — SIGNIFICANT CHANGE UP (ref 3.8–10.5)
WBC # FLD AUTO: 6.25 K/UL — SIGNIFICANT CHANGE UP (ref 3.8–10.5)

## 2024-10-22 PROCEDURE — 99291 CRITICAL CARE FIRST HOUR: CPT

## 2024-10-22 PROCEDURE — 99232 SBSQ HOSP IP/OBS MODERATE 35: CPT

## 2024-10-22 PROCEDURE — 71045 X-RAY EXAM CHEST 1 VIEW: CPT | Mod: 26

## 2024-10-22 RX ORDER — ERYTHROMYCIN 5 MG/G
1 OINTMENT OPHTHALMIC EVERY 4 HOURS
Refills: 0 | Status: DISCONTINUED | OUTPATIENT
Start: 2024-10-22 | End: 2024-10-26

## 2024-10-22 RX ORDER — OXYCODONE HYDROCHLORIDE 30 MG/1
5 TABLET ORAL EVERY 4 HOURS
Refills: 0 | Status: DISCONTINUED | OUTPATIENT
Start: 2024-10-22 | End: 2024-10-22

## 2024-10-22 RX ORDER — OFLOXACIN 3 MG/ML
1 SOLUTION OPHTHALMIC
Refills: 0 | Status: DISCONTINUED | OUTPATIENT
Start: 2024-10-22 | End: 2024-10-27

## 2024-10-22 RX ORDER — HYPROMELLOSE 0.4 %
1 DROPS OPHTHALMIC (EYE)
Refills: 0 | Status: DISCONTINUED | OUTPATIENT
Start: 2024-10-22 | End: 2024-10-27

## 2024-10-22 RX ORDER — OXYCODONE HYDROCHLORIDE 30 MG/1
5 TABLET ORAL EVERY 4 HOURS
Refills: 0 | Status: DISCONTINUED | OUTPATIENT
Start: 2024-10-22 | End: 2024-10-28

## 2024-10-22 RX ADMIN — Medication 1 DROP(S): at 14:54

## 2024-10-22 RX ADMIN — Medication 1 TABLET(S): at 06:25

## 2024-10-22 RX ADMIN — OFLOXACIN 1 DROP(S): 3 SOLUTION OPHTHALMIC at 17:31

## 2024-10-22 RX ADMIN — LABETALOL HYDROCHLORIDE 50 MILLIGRAM(S): 200 TABLET, FILM COATED ORAL at 06:34

## 2024-10-22 RX ADMIN — AMPICILLIN SODIUM AND SULBACTAM SODIUM 200 GRAM(S): 1; .5 INJECTION, POWDER, FOR SOLUTION INTRAMUSCULAR; INTRAVENOUS at 13:52

## 2024-10-22 RX ADMIN — Medication 25 MILLIGRAM(S): at 06:34

## 2024-10-22 RX ADMIN — OFLOXACIN 1 DROP(S): 3 SOLUTION OPHTHALMIC at 13:06

## 2024-10-22 RX ADMIN — TAMSULOSIN HYDROCHLORIDE 0.8 MILLIGRAM(S): 0.4 CAPSULE ORAL at 21:21

## 2024-10-22 RX ADMIN — AMPICILLIN SODIUM AND SULBACTAM SODIUM 200 GRAM(S): 1; .5 INJECTION, POWDER, FOR SOLUTION INTRAMUSCULAR; INTRAVENOUS at 21:21

## 2024-10-22 RX ADMIN — ERYTHROMYCIN 1 APPLICATION(S): 5 OINTMENT OPHTHALMIC at 21:23

## 2024-10-22 RX ADMIN — ACETYLCYSTEINE 4 MILLILITER(S): 200 INHALANT RESPIRATORY (INHALATION) at 05:05

## 2024-10-22 RX ADMIN — Medication 1 DROP(S): at 23:07

## 2024-10-22 RX ADMIN — OFLOXACIN 1 DROP(S): 3 SOLUTION OPHTHALMIC at 23:08

## 2024-10-22 RX ADMIN — IPRATROPIUM BROMIDE AND ALBUTEROL SULFATE 3 MILLILITER(S): .5; 2.5 SOLUTION RESPIRATORY (INHALATION) at 19:40

## 2024-10-22 RX ADMIN — AMPICILLIN SODIUM AND SULBACTAM SODIUM 200 GRAM(S): 1; .5 INJECTION, POWDER, FOR SOLUTION INTRAMUSCULAR; INTRAVENOUS at 02:49

## 2024-10-22 RX ADMIN — HEPARIN SODIUM 5000 UNIT(S): 1000 INJECTION INTRAVENOUS; SUBCUTANEOUS at 13:51

## 2024-10-22 RX ADMIN — HEPARIN SODIUM 5000 UNIT(S): 1000 INJECTION INTRAVENOUS; SUBCUTANEOUS at 06:36

## 2024-10-22 RX ADMIN — Medication 1 DROP(S): at 13:06

## 2024-10-22 RX ADMIN — Medication 667 MILLIGRAM(S): at 17:30

## 2024-10-22 RX ADMIN — AMPICILLIN SODIUM AND SULBACTAM SODIUM 200 GRAM(S): 1; .5 INJECTION, POWDER, FOR SOLUTION INTRAMUSCULAR; INTRAVENOUS at 17:31

## 2024-10-22 RX ADMIN — ERYTHROMYCIN 1 APPLICATION(S): 5 OINTMENT OPHTHALMIC at 11:13

## 2024-10-22 RX ADMIN — DOXAZOSIN MESYLATE 4 MILLIGRAM(S): 8 TABLET ORAL at 21:22

## 2024-10-22 RX ADMIN — ERYTHROMYCIN 1 APPLICATION(S): 5 OINTMENT OPHTHALMIC at 13:52

## 2024-10-22 RX ADMIN — ERYTHROMYCIN 1 APPLICATION(S): 5 OINTMENT OPHTHALMIC at 00:02

## 2024-10-22 RX ADMIN — Medication 25 MILLIGRAM(S): at 21:21

## 2024-10-22 RX ADMIN — Medication 1 TABLET(S): at 13:51

## 2024-10-22 RX ADMIN — IPRATROPIUM BROMIDE AND ALBUTEROL SULFATE 3 MILLILITER(S): .5; 2.5 SOLUTION RESPIRATORY (INHALATION) at 05:04

## 2024-10-22 RX ADMIN — AMLODIPINE BESYLATE 10 MILLIGRAM(S): 10 TABLET ORAL at 06:25

## 2024-10-22 RX ADMIN — Medication 1 DROP(S): at 21:18

## 2024-10-22 RX ADMIN — HEPARIN SODIUM 5000 UNIT(S): 1000 INJECTION INTRAVENOUS; SUBCUTANEOUS at 21:21

## 2024-10-22 RX ADMIN — SODIUM ZIRCONIUM CYCLOSILICATE 5 GRAM(S): 5 POWDER, FOR SUSPENSION ORAL at 11:13

## 2024-10-22 RX ADMIN — AMPICILLIN SODIUM AND SULBACTAM SODIUM 200 GRAM(S): 1; .5 INJECTION, POWDER, FOR SOLUTION INTRAMUSCULAR; INTRAVENOUS at 10:02

## 2024-10-22 RX ADMIN — Medication 1 DROP(S): at 17:30

## 2024-10-22 RX ADMIN — ERYTHROMYCIN 1 APPLICATION(S): 5 OINTMENT OPHTHALMIC at 06:45

## 2024-10-22 RX ADMIN — POLYETHYLENE GLYCOL 3350 17 GRAM(S): 17 POWDER, FOR SOLUTION ORAL at 06:34

## 2024-10-22 RX ADMIN — Medication 667 MILLIGRAM(S): at 11:14

## 2024-10-22 RX ADMIN — Medication 40 MILLIGRAM(S): at 11:13

## 2024-10-22 RX ADMIN — Medication 667 MILLIGRAM(S): at 07:45

## 2024-10-22 RX ADMIN — Medication 1 APPLICATION(S): at 17:31

## 2024-10-22 RX ADMIN — Medication 25 MILLIGRAM(S): at 13:51

## 2024-10-22 RX ADMIN — Medication 1 APPLICATION(S): at 06:45

## 2024-10-22 RX ADMIN — Medication 1 DROP(S): at 19:39

## 2024-10-22 RX ADMIN — AMPICILLIN SODIUM AND SULBACTAM SODIUM 200 GRAM(S): 1; .5 INJECTION, POWDER, FOR SOLUTION INTRAMUSCULAR; INTRAVENOUS at 06:25

## 2024-10-22 RX ADMIN — ERYTHROMYCIN 1 APPLICATION(S): 5 OINTMENT OPHTHALMIC at 17:30

## 2024-10-22 RX ADMIN — Medication 80 MILLILITER(S): at 10:02

## 2024-10-22 RX ADMIN — ACETYLCYSTEINE 4 MILLILITER(S): 200 INHALANT RESPIRATORY (INHALATION) at 19:40

## 2024-10-22 RX ADMIN — Medication 100 MILLIGRAM(S): at 06:25

## 2024-10-22 NOTE — PROGRESS NOTE ADULT - SUBJECTIVE AND OBJECTIVE BOX
HPI:  Unknown age male, unknown past medical history (reported stroke and MI by coworkers) presented to [facility] with AMS, Pt was working at [employer] when he was found down by coworkers. EMS called and pt brought to [facility] ED. Intubated, sedated, started on cardene for SBPs in 200s. CT head showed brain stem bleed. Transferred to NSICU for further management.  (30 Sep 2024 12:55)    INTERVAL EVENTS:  GEORGE    HOSPITAL COURSE:  9/30: transferred from [facility]. A line placed. Versed dc'd. Cy Rader at bedside, states pt has family in [place], cannot confirm medications or PMH other than stroke and MI. 250cc bolus 3% given. LR switched to NS. hydralazine 25q8 started, 3% started, switched propofol to precedex   10/1: stability CTH done. Added labetalol, started TF. Palliative consulted. ethics consulted to determine surrogate. febrile 103, pan cx sent  10/2: BD 2, GEORGE overnight. TF resumed. Desatt'd to 80s, FiO2 inc. to 50. Fentanyl given, ABG, CXR ordered. Maxxed on precedex, started on propofol for DARIEN -4 - -5. Precedex dc'd. Duonebs, mucomyst, hypertonic added. 3% dc'd. Cardene dc'd. Start vanc/CTX. Increased labetalol 200q8. MRSA negative, dc'd vanc. ETT pulled back 2cm x 2, good positioning after confirmatory chest xray. Ethics attempting to establish HCP with family. Na 159, starting FW 250q6 for range 150-155.   10/3: BD3, GEORGE o/n, neuro stable. Na elevating, FW increased to 300q6. Dc'd bowel reg for diarrhea. vEEG started. SQH 5000q8 tonight.   10/4: BD 4, albumn bolus, incr. LR to 80 2/2 incr. in Cr, LR to 100cc/hr for uptrending Cr. Started 7% hypersal for 48hrs and SL atropine for inline/oral thick secretions. Dc'd CTX and started ancef for MSSA in the sputum. Nephrology consulted for CKD, f/u recs. SBP 170s, given hydralazine 10mg IVP.   10/5: BD5, o/n 10mg IVP hydralazine given for SBP 170s and started on hydralazine 25q8 via OGT. 10mg IV push labetalol for SBP > 160s. RT placed for diarrhea.   10/6: BD6, o/n FW increased to 350q4 per nephrology recs. IV tylenol for temp 100.6, SBp 160s presumed uncomfortable.   10/7: BD7, overnight pancultured for temp 101.8F.   10/8: BD8. GEORGE. Cr bumped. decreased LR to 75cc/hr. Adding simethicone ATC. incr hydralazine 50mgTID. Incr labetalol 300mgTID. Na 145, decreased FWF to 250q6. Start precedex. FENa consistent with intrinsic kidney injury. Pend repeat renal US. Retaining up to 1.3L, bladder scans q6, straight cath PRN  10/9: BD 9. GEORGE overnight. Neuro stable. abd xray for distention w non-specific gas pattern, OGT to LIWS for morning. duonebs/mucomyst to q8 for improving secretions. Changed tube feeds to Jevity 1.5 20cc/hr, low rate due to abdominal distention, nepro dense and more difficult to digest. Tolerating CPAP, confirmed by ABG.   10/10: BD 10. GEORGE overnight. Neuro stable. (+) gabriel for urinary retention on bladder scan. inc TF to goal rate of 40cc/hr. family leaning toward pursuing trach/PEG. 1/2 amp for FS 81.   10/11: BD 11. GEORGE overnight. Neuro stable. Trach/PEG consults placed.   10/12: BD 12. GEORGE overnight. Neuro stable. MRI brain complete.   10/13: BD 13. Increase flomax. Hold SQH after PM dose for trach tm. IVL.   10/14: BD 14. GEORGE overnight, remains on AC/VC. Gabriel placed for urinary retention. Dc'd free water.  S/p trach with pulm. NGT placed and CXR confirmed in good position.   10/15: BD 15, GEORGE ovn. resumed feeds. spiked 101, pan cx sent.   10/16: BD 16. GEORGE ovn. Lokelma 5mg for K+ 5.4. Started vanc q 24/zosyn for empiric PNA coverage, IVF to 100/hr. PEG held for fever.   10/17: BD 17,  ordered serum osm and urine osm for am. Started sinemet for neurostimulation. Increased cardura to 0.8. Started FW 100q4, dc'd IVF. MRSA negative, dc'd vanc. NGT replaced d/t coiling.   10/18: BD 18, GEORGE overnight, neuro stable. Amantadine added for neurostim. zosyn changed to unasyn for acinetobacter baumannii, failed TOV and required SC  10/19: BD 19, GEORGE ovn. cardura 2mg added for retention. labetalol decreased 200q8, hydralazine decreased 25q8. Gabriel replaced.   10/20: BD20, GEORGE overnight. NGT dislodged, replaced. PEG tomorrow w/ gen surg, FW increased to 150q4 and labetalol decreased to 100q8, lokelma given for hyperkalemia.   10/21: BD 21. POD0 PEG placement with Gen surg. decr labetolol to 50q8, incr. cardura to 0.4, started lokelma and phoslo, dc gabriel POD0 PEG placement with Gen surg.  10/22: BD 22. Plan to start TF today via PEG.       Vital Signs Last 24 Hrs  T(C): 37.3 (22 Oct 2024 00:19), Max: 37.6 (21 Oct 2024 17:51)  T(F): 99.1 (22 Oct 2024 00:19), Max: 99.7 (21 Oct 2024 17:51)  HR: 80 (22 Oct 2024 00:00) (74 - 93)  BP: --  BP(mean): --  RR: 16 (22 Oct 2024 00:00) (10 - 21)  SpO2: 100% (22 Oct 2024 00:00) (95% - 100%)    Parameters below as of 22 Oct 2024 00:00  Patient On (Oxygen Delivery Method): ventilator, AC    O2 Concentration (%): 40    I&O's Summary    20 Oct 2024 07:01  -  21 Oct 2024 07:00  --------------------------------------------------------  IN: 2790 mL / OUT: 1980 mL / NET: 810 mL    21 Oct 2024 07:01  -  22 Oct 2024 00:39  --------------------------------------------------------  IN: 2100 mL / OUT: 2155 mL / NET: -55 mL        PHYSICAL EXAM:  Constitutional: Lying in bed.   HEENT: R eye taped shut. L pupil, round, reactive to light. Face symmetric, tongue midline.   Respiratory: +Trach to vent, No respiratory distress, lungs clear to auscultation bilaterally.   Cardiovascular: RRR, S1, S2.   Gastrointestinal: +NGT, abdomen soft, nondistended.  Neurological:  AAOX0, eyes open spontaneously. does not follow commands, looks up/down.   Motor: RUE extends, RLE TF, LUE trace w/d, LLE w/d  Extremities: Warm, well perfused.      LABS:                        8.9    7.39  )-----------( 408      ( 21 Oct 2024 04:38 )             29.1     10-21    137  |  108  |  48[H]  ----------------------------<  106[H]  5.2   |  20[L]  |  3.40[H]    Ca    8.7      21 Oct 2024 04:38  Phos  5.1     10-21  Mg     2.2     10-21      PT/INR - ( 21 Oct 2024 04:38 )   PT: 12.3 sec;   INR: 1.05          PTT - ( 21 Oct 2024 04:38 )  PTT:31.6 sec  Urinalysis Basic - ( 21 Oct 2024 04:38 )    Color: x / Appearance: x / SG: x / pH: x  Gluc: 106 mg/dL / Ketone: x  / Bili: x / Urobili: x   Blood: x / Protein: x / Nitrite: x   Leuk Esterase: x / RBC: x / WBC x   Sq Epi: x / Non Sq Epi: x / Bacteria: x          CAPILLARY BLOOD GLUCOSE      POCT Blood Glucose.: 94 mg/dL (21 Oct 2024 18:37)  POCT Blood Glucose.: 94 mg/dL (21 Oct 2024 12:37)  POCT Blood Glucose.: 110 mg/dL (21 Oct 2024 06:11)      Drug Levels: [] N/A    CSF Analysis: [] N/A      Allergies    Allergy Status Unknown    Intolerances      MEDICATIONS:  Antibiotics:  ampicillin/sulbactam  IVPB 3 Gram(s) IV Intermittent <User Schedule>    Neuro:  acetaminophen   Oral Liquid .. 650 milliGRAM(s) Oral every 6 hours PRN  amantadine 100 milliGRAM(s) Oral <User Schedule>  carbidopa/levodopa  25/100 1 Tablet(s) Oral <User Schedule>  oxyCODONE    IR 5 milliGRAM(s) Oral every 4 hours PRN    Anticoagulation:  heparin   Injectable 5000 Unit(s) SubCutaneous every 8 hours    OTHER:  acetylcysteine 10%  Inhalation 4 milliLiter(s) Inhalation every 12 hours  albuterol/ipratropium for Nebulization 3 milliLiter(s) Nebulizer every 12 hours  amLODIPine   Tablet 10 milliGRAM(s) Oral daily  bisacodyl Suppository 10 milliGRAM(s) Rectal daily PRN  chlorhexidine 2% Cloths 1 Application(s) Topical <User Schedule>  doxazosin 4 milliGRAM(s) Oral at bedtime  erythromycin   Ointment 1 Application(s) Right EYE every 6 hours  hydrALAZINE 25 milliGRAM(s) Oral every 8 hours  labetalol 50 milliGRAM(s) Oral every 8 hours  pantoprazole  Injectable 40 milliGRAM(s) IV Push daily  petrolatum Ophthalmic Ointment 1 Application(s) Both EYES two times a day  polyethylene glycol 3350 17 Gram(s) Oral two times a day  senna 2 Tablet(s) Oral at bedtime  sodium zirconium cyclosilicate 5 Gram(s) Oral daily  tamsulosin 0.8 milliGRAM(s) Oral at bedtime    IVF:  calcium acetate 667 milliGRAM(s) Oral three times a day with meals  sodium chloride 0.9%. 1000 milliLiter(s) IV Continuous <Continuous>    CULTURES:  Culture Results:   Mixed gram negative rods including  Moderate Acinetobacter baumannii/nosocomialis group (10-16 @ 00:13)  Culture Results:   No growth at 5 days (10-15 @ 14:55)    RADIOLOGY & ADDITIONAL TESTS:      ASSESSMENT:  43 y/o PMH ?stroke/MI present to [facility] after collapsing at work. Decorticate posturing, vomiting, intubated for airway protection. 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 Transferred to [facility] for further management. s/p trach 10/14.     Neuro:  - Neuro q4/vitals q1  - pain control: Tylenol prn, oxy prn  - CTH 9/30: enlarged pontine hemorrhage, CTH 10/3 done read stable   - vEEG (10/3-4)- negative, (10/17-10/19) - negative  - stroke core measures, stroke neuro following  - MRI brain w/ w/o contrast 10/12: parenchymal hemorrhage, acute/subacute R cerebellar stroke    - neurostimulation: amantadine, sinemet 25/100 BID    CV:  - SBP <150  - HTN: amlodipine 10 mg qd, labetalol 50 mg q8h, hydralazine 25 mg q8h   - echo (9/30) EF 75%    Resp:  - trach to vent, CPAP as tolerated  - duonebs/mucomyst q12    GI:  - NPO except meds, PEG (10/21)   - bowel reg, LBM 10/21  - GI ppx: protonix while intubated    Renal:  - IVF while NPO, FW 100q6  - hyperK 10/20: lokelma 5mg daily (10/21 -)  - hyperPhos: phoslo 667mg TID (10/21 -)  - Urinary retenion: Flomax 0.8mg, cardura 0.4 mg   - gabriel (10/14-10/18), (10/19 - )  - CKD: trend Cr  - renal US 10/1: echogenicity c/w chronic med renal dz, repeat 10/8: inc renal echogeicity, c/f medical renal disease w increased hydro     Endo:  - A1c 5.4    Heme:  - DVT ppx: SCDs, SQH 5000q8     ID:  - last pan cx 10/15  - 10/15 sputum +actinobacter baumanii, on unasyn (10/18-10/23)  - MSSA swab positive (10/17)  - MRSA swab negative (10/2), sputum MSSA + , s/p 1 dose vanc (10/2), CTX (10/2 - 10/4), Ancef (10/4-10/14)     Dispo: NSICU, full code, pending placement    D/w Dr. D'Amico

## 2024-10-22 NOTE — PROGRESS NOTE ADULT - SUBJECTIVE AND OBJECTIVE BOX
=================================  NEUROCRITICAL CARE ATTENDING NOTE  =================================    GARETT DELEON   MRN-[id number]  Summary:  44y/M  unknown past medical history (reported stroke and MI by coworkers) presented to [facility] with AMS, Pt was working at [employer] when he was found down by coworkers. EMS called and pt brought to [facility] ED. Intubated, sedated, started on cardene for SBPs in 200s. CT head showed brain stem bleed. Transferred to NSICU for further management.  (30 Sep 2024 12:55)    COURSE IN THE HOSPITAL:  Date	POD	BD	Events	Surgery  	-	-	Transferred from [facility]. A-line placed. Versed discontinued. Hydralazine, 3% saline started. Changed propofol to precedex.	-  10/1	-	-	Stability CTH done. Added labetalol, started TF. Palliative and ethics consulted. Febrile 103°F, pan cultures sent.	-  10/2	-		Desaturation to 80s, FiO2 increased. Fentanyl given, ABG, CXR ordered. Propofol started. Vanc/CTX started. MRSA negative, vanc discontinued. Na 159, starting FW 250q6.	-  10/3	-	3	Na increasing, FW increased to 300q6. Bowel regimen discontinued. vEEG started.	-  10/4	-		Albumin bolus, increased LR. 7% hypersal started for 48hrs. Nephrology consulted for CKD. SBP 170s, hydralazine given.	-  10/5	-		Hydralazine and labetalol for SBP. RT placed for diarrhea.	-  10/6	-		FW increased to 350q4. IV Tylenol for temp 100.6°F.	-  10/7	-		Pancultured for temp 101.8°F.	-  10/8	-		Cr increase, LR decreased. Simethicone added. Increased hydralazine, labetalol. Na 145, FW 250q6. Retaining fluid, bladder scans ordered.	-  10/9	-		Abd x-ray for distention. Changed tube feeds to Jevity 1.5. Tolerating CPAP.	-  10/10	-	10	Vuong for urinary retention. Increased TF to goal. Family leaning toward trach/PEG.	-  10/11	-		Trach/PEG consults placed.	-  10/12	-		MRI brain complete.	-  10/13	-	13	Increased flomax. Hold SQH for trach.	-  10/14	-		S/p trach with pulmonary. NGT placed, CXR confirmed position.	-  10/15	-	15	Resumed feeds. Spiked fever 101°F, pan cultures sent.	-  10/16	-	16	Lokelma for K+ 5.4. Started vanc/zosyn for PNA. PEG held for fever.	-  10/17	-	17	Serum and urine osm ordered. Started sinemet, FW 100q4, IVF discontinued. MRSA negative, vanc discontinued. NGT replaced.	-  10/18	-	18	Amantadine added. Changed zosyn to unasyn for acinetobacter baumannii. Failed TOV, required SC.	-  10/19	-	19	Cardura added for retention. Decreased labetalol, hydralazine. Vuong replaced.	-  10/20	-	20	NGT dislodged, replaced. PEG scheduled for tomorrow. Lokelma for hyperkalemia.  10/21	-	21	s/p PEG  10/21	-	22	No significant events overnight.     Past Medical History: Acute myocardial infarction CVA (cerebral vascular accident)  Allergies:  Allergy Status Unknown  Home meds:     PHYSICAL EXAMINATION  T(C): 37.6 (10-22-24 @ 05:16), Max: 37.6 (10-21-24 @ 17:51) HR: 85 (10-22-24 @ 05:02) (74 - 99) ABP: 139/79 (10-22-24 @ 05:00) (111/64 - 156/83) RR: 16 (10-22-24 @ 05:00) (10 - 19) SpO2: 99% (10-22-24 @ 05:02) (97% - 100%)  NEUROLOGIC EXAMINATION:  Patient is L eye opens spontaneously, ?follows commands (look up and down), does not track. (+) L corneal, weak R corneal, unable to close R eyelid, withdraws B UE, TF B LE  GENERAL: trached CPAP 10/ 40%  EENT:  anicteric  CARDIOVASCULAR: (+) S1 S2, normal rate and regular rhythm  PULMONARY: clear to auscultation bilaterally (q2h suctioning, thick copious)  ABDOMEN: soft, nontender with normoactive bowel sounds  EXTREMITIES: no edema  SKIN: no rash    LABS: 10-22  CAPILLARY BLOOD GLUCOSE 94 94                8.9    7.39  )-----------( 408      ( 21 Oct 2024 04:38 )             29.1     137  |  108  |  48[H]  ----------------------------<  106[H]  5.2   |  20[L]  |  3.40[H] (3.39)    Ca    8.7      21 Oct 2024 04:38  Phos  5.1     10-21  Mg     2.2     10-21    10-21 @ 07:01  -  10-22 @ 07:00  --------------------------------------------------------  IN: 2500 mL / OUT: 3155 mL / NET: -655 mL      Bacteriology:  10/16 sputum culture acinetobacter  10/15 Blood CS [id number]    CSF studies:  EEG:  Neuroimagin2024 9:30 AM	CT Brain	Large pontine hemorrhage, SAH, edema, no hydrocephalus. Multiple old infarcts.  2024 1:11 PM	CTA Head/Neck	Pontine hemorrhage stable, no malformation, stable ventricular size, no stenosis or aneurysm.  2024 6:40 PM	CT Brain	Enlarged pontine hemorrhage, SAH in frontal lobes.  2024        	CT Brain	Stable pontine hemorrhage, SAH stable.  10/03/2024 11:06 AM	CT Brain	Stable hematoma, no rebleeding.  10/12/2024 9:16 AM	MRI Brain	Stable hemorrhage, no new bleeding, cerebellar infarct.    Other imaging:  Date/Time	Type of Study	Results  10/01/2024 11:49 AM	US Retroperit	No hydronephrosis; chronic renal disease; Vuong catheter.  10/01/2024 11:14 AM	XR Abdomen	Orogastric tube coiled in stomach; nonspecific bowel gas pattern.  10/07/2024 10:25 PM	XR Abdomen	Nonspecific bowel gas pattern.  10/08/2024 8:01 AM	US Retroperit	Normal kidneys; distended bladder; mild hydronephrosis.  10/10/2024 7:32 AM	XR Abdomen	Nasogastric tube; nonspecific bowel gas pattern.  10/19/2024 1:13 PM	US DPLX Veins	No DVT in lower extremities.    MEDICATIONS: 10-22    ·	heparin   Injectable 5000 SubCutaneous every 8 hours  ·	ampicillin/sulbactam  IVPB 3 IV Intermittent <User Schedule>  ·	amantadine 100 Oral <User Schedule>  ·	carbidopa/levodopa  25/100 1 Oral <User Schedule>  ·	amLODIPine   Tablet 10 milliGRAM(s) Oral daily  ·	doxazosin 4 milliGRAM(s) Oral at bedtime  ·	hydrALAZINE 25 milliGRAM(s) Oral every 8 hours  ·	labetalol 50 milliGRAM(s) Oral every 8 hours  ·	acetylcysteine 10%  Inhalation 4 Inhalation every 12 hours  ·	albuterol/ipratropium for Nebulization 3 Nebulizer every 12 hours  ·	pantoprazole  Injectable 40 IV Push daily  ·	polyethylene glycol 3350 17 Oral two times a day  ·	senna 2 Oral at bedtime  ·	calcium acetate 667 Oral three times a day with meals  ·	erythromycin   Ointment 1 Right EYE every 6 hours  ·	petrolatum Ophthalmic Ointment 1 Both EYES two times a day  ·	sodium zirconium cyclosilicate 5 Oral daily  ·	tamsulosin 0.8 Oral at bedtime  ·	acetaminophen   Oral Liquid .. 650 Oral every 6 hours PRN  ·	bisacodyl Suppository 10 Rectal daily PRN  ·	oxyCODONE    IR 5 Oral every 4 hours PRN    IV FLUIDS: NS@80  DRIPS:  DIET: NPO  Lines: Katt  Drains:  Vuong   Wounds:    CODE STATUS:  Full Code                       GOALS OF CARE:  aggressive                      DISPOSITION:  ICU  ICH Score 3 =================================  NEUROCRITICAL CARE ATTENDING NOTE  =================================    GARETT DELEON   MRN-[id number]  Summary:  44y/M  unknown past medical history (reported stroke and MI by coworkers) presented to [facility] with AMS, Pt was working at [employer] when he was found down by coworkers. EMS called and pt brought to [facility] ED. Intubated, sedated, started on cardene for SBPs in 200s. CT head showed brain stem bleed. Transferred to NSICU for further management.  (30 Sep 2024 12:55)    COURSE IN THE HOSPITAL:  Date	POD	BD	Events	Surgery  	-	-	Transferred from [facility]. A-line placed. Versed discontinued. Hydralazine, 3% saline started. Changed propofol to precedex.	-  10/1	-	-	Stability CTH done. Added labetalol, started TF. Palliative and ethics consulted. Febrile 103°F, pan cultures sent.	-  10/2	-		Desaturation to 80s, FiO2 increased. Fentanyl given, ABG, CXR ordered. Propofol started. Vanc/CTX started. MRSA negative, vanc discontinued. Na 159, starting FW 250q6.	-  10/3	-	3	Na increasing, FW increased to 300q6. Bowel regimen discontinued. vEEG started.	-  10/4	-		Albumin bolus, increased LR. 7% hypersal started for 48hrs. Nephrology consulted for CKD. SBP 170s, hydralazine given.	-  10/5	-		Hydralazine and labetalol for SBP. RT placed for diarrhea.	-  10/6	-		FW increased to 350q4. IV Tylenol for temp 100.6°F.	-  10/7	-		Pancultured for temp 101.8°F.	-  10/8	-		Cr increase, LR decreased. Simethicone added. Increased hydralazine, labetalol. Na 145, FW 250q6. Retaining fluid, bladder scans ordered.	-  10/9	-		Abd x-ray for distention. Changed tube feeds to Jevity 1.5. Tolerating CPAP.	-  10/10	-	10	Vuong for urinary retention. Increased TF to goal. Family leaning toward trach/PEG.	-  10/11	-		Trach/PEG consults placed.	-  10/12	-		MRI brain complete.	-  10/13	-	13	Increased flomax. Hold SQH for trach.	-  10/14	-		S/p trach with pulmonary. NGT placed, CXR confirmed position.	-  10/15	-	15	Resumed feeds. Spiked fever 101°F, pan cultures sent.	-  10/16	-	16	Lokelma for K+ 5.4. Started vanc/zosyn for PNA. PEG held for fever.	-  10/17	-	17	Serum and urine osm ordered. Started sinemet, FW 100q4, IVF discontinued. MRSA negative, vanc discontinued. NGT replaced.	-  10/18	-	18	Amantadine added. Changed zosyn to unasyn for acinetobacter baumannii. Failed TOV, required SC.	-  10/19	-	19	Cardura added for retention. Decreased labetalol, hydralazine. Vuong replaced.	-  10/20	-	20	NGT dislodged, replaced. PEG scheduled for tomorrow. Lokelma for hyperkalemia.  10/21	-	21	s/p PEG  10/21	-	22	No significant events overnight. apnea on CPAP    Past Medical History: Acute myocardial infarction CVA (cerebral vascular accident)  Allergies:  Allergy Status Unknown  Home meds:     PHYSICAL EXAMINATION  T(C): 37.6 (10-22-24 @ 05:16), Max: 37.6 (10-21-24 @ 17:51) HR: 85 (10-22-24 @ 05:02) (74 - 99) ABP: 139/79 (10-22-24 @ 05:00) (111/64 - 156/83) RR: 16 (10-22-24 @ 05:00) (10 - ) SpO2: 99% (10-22-24 @ 05:02) (97% - 100%)  NEUROLOGIC EXAMINATION:  Patient is L eye opens spontaneously, does not FC, tracks, (+) L corneal, weak R corneal, unable to close R eyelid, R UE trace movements on central noxious; L UE trace distal movements to central stimulation ; TF B LE  GENERAL: trached CPAP 10/5 40%  EENT:  anicteric, R eye erythematous  CARDIOVASCULAR: (+) S1 S2, normal rate and regular rhythm  PULMONARY: clear to auscultation bilaterally (q2h suctioning, thick copious)  ABDOMEN: soft, nontender with normoactive bowel sounds  EXTREMITIES: no edema  SKIN: no rash    LABS: 10-22  CAPILLARY BLOOD GLUCOSE 94 94                9.0  (8.9)  6.25  )-----------( 351      ( 22 Oct 2024 07:41 )             28.6     140  |  110[H]  |  44[H]  ----------------------------<  94  5.5[H]   |  20[L]  |  3.18[H]    Ca    9.0      22 Oct 2024 07:41  Phos  4.8     10-22  Mg     2.1     10-22    10-21 @ 07:  -  10-22 @ 07:00  --------------------------------------------------------  IN: 2860 mL / OUT: 3855 mL / NET: -995 mL    10-22 @ 07:01  -  10-22 @ 08:53  --------------------------------------------------------  IN: 80 mL / OUT: 0 mL / NET: 80 mL    Bacteriology:  10/16 sputum culture acinetobacter  10/15 Blood CS NG5D    CSF studies:  EEG:  [id number] 9:30 AM	CT Brain	Large pontine hemorrhage, SAH, edema, no hydrocephalus. Multiple old infarcts.  2024 1:11 PM	CTA Head/Neck	Pontine hemorrhage stable, no malformation, stable ventricular size, no stenosis or aneurysm.  2024 6:40 PM	CT Brain	Enlarged pontine hemorrhage, SAH in frontal lobes.  2024        	CT Brain	Stable pontine hemorrhage, SAH stable.  10/03/2024 11:06 AM	CT Brain	Stable hematoma, no rebleeding.  10/12/2024 9:16 AM	MRI Brain	Stable hemorrhage, no new bleeding, cerebellar infarct.    Other imaging:  Date/Time	Type of Study	Results  10/01/2024 11:49 AM	US Retroperit	No hydronephrosis; chronic renal disease; Vuong catheter.  10/01/2024 11:14 AM	XR Abdomen	Orogastric tube coiled in stomach; nonspecific bowel gas pattern.  10/07/2024 10:25 PM	XR Abdomen	Nonspecific bowel gas pattern.  10/08/2024 8:01 AM	US Retroperit	Normal kidneys; distended bladder; mild hydronephrosis.  10/10/2024 7:32 AM	XR Abdomen	Nasogastric tube; nonspecific bowel gas pattern.  10/19/2024 1:13 PM	US DPLX Veins	No DVT in lower extremities.    MEDICATIONS: 10-22    ·	heparin   Injectable 5000 SubCutaneous every 8 hours  ·	ampicillin/sulbactam  IVPB 3 IV Intermittent <User Schedule>  ·	amantadine 100 Oral <User Schedule>  ·	carbidopa/levodopa  25/100 1 Oral <User Schedule>  ·	amLODIPine   Tablet 10 milliGRAM(s) Oral daily  ·	doxazosin 4 milliGRAM(s) Oral at bedtime  ·	hydrALAZINE 25 milliGRAM(s) Oral every 8 hours  ·	labetalol 50 milliGRAM(s) Oral every 8 hours  ·	acetylcysteine 10%  Inhalation 4 Inhalation every 12 hours  ·	albuterol/ipratropium for Nebulization 3 Nebulizer every 12 hours  ·	pantoprazole  Injectable 40 IV Push daily  ·	polyethylene glycol 3350 17 Oral two times a day  ·	senna 2 Oral at bedtime  ·	calcium acetate 667 Oral three times a day with meals  ·	erythromycin   Ointment 1 Right EYE every 6 hours  ·	petrolatum Ophthalmic Ointment 1 Both EYES two times a day  ·	sodium zirconium cyclosilicate 5 Oral daily  ·	tamsulosin 0.8 Oral at bedtime  ·	acetaminophen   Oral Liquid .. 650 Oral every 6 hours PRN  ·	bisacodyl Suppository 10 Rectal daily PRN  ·	oxyCODONE    IR 5 Oral every 4 hours PRN    IV FLUIDS: NS@80  DRIPS:  DIET: NPO  Lines:   Drains:    Wounds:    CODE STATUS:  Full Code                       GOALS OF CARE:  aggressive                      DISPOSITION:  ICU  ICH Score 3

## 2024-10-22 NOTE — PROGRESS NOTE ADULT - ASSESSMENT
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 Patient does not have a prior hx of abdominal surgeries, is not currently on any anticoagulation and is only on SQH. Patients son had a long discussion with palliative care regarding goals of care, is understanding the chance of full reocvery is poor, patient's son (SDM) would like to proceed with any measures to prolong his life which includes PEG and trach. Patient is now s/p trach by pulm 10/14 and s/p PEG 10/21.    Recommendations:  Can begin trickle feeds and advance to goal  Rest of care per primary team    Plan discussed with chief resident and attending

## 2024-10-22 NOTE — PROGRESS NOTE ADULT - SUBJECTIVE AND OBJECTIVE BOX
Neurocritical Care Attending Note     GARETT REYES   MRN-[id number]  Summary:  44y/M  unknown past medical history (reported stroke and MI by coworkers) presented to [facility] with AMS, Pt was working at [employer] when he was found down by coworkers. EMS called and pt brought to [facility] ED. Intubated, sedated, started on cardene for SBPs in 200s. CT head showed brain stem bleed. Transferred to NSICU for further management.  (30 Sep 2024 12:55)    Hospital Course/Interval Events  9/30: transferred from [facility]. A line placed. Versed dc'd. Roomate Prasanth at bedside, states pt has family in [place], cannot confirm medications or PMH other than stroke and MI. 250cc bolus 3% given. LR switched to NS. hydralazine 25q8 started, 3% started, switched propofol to precedex   10/1: stability CTH done. Added labetalol, started TF. Palliative consulted. ethics consulted to determine surrogate. febrile 103, pan cx sent  10/2: BD 2, GEORGE overnight. TF resumed. Desatt'd to 80s, FiO2 inc. to 50. Fentanyl given, ABG, CXR ordered. Maxxed on precedex, started on propofol for DARIEN -4 - -5. Precedex dc'd. Duonebs, mucomyst, hypertonic added. 3% dc'd. Cardene dc'd. Start vanc/CTX. Increased labetalol 200q8. MRSA negative, dc'd vanc. ETT pulled back 2cm x 2, good positioning after confirmatory chest xray. Ethics attempting to establish HCP with family. Na 159, starting FW 250q6 for range 150-155.   10/3: BD3, GEORGE o/n, neuro stable. Na elevating, FW increased to 300q6. Dc'd bowel reg for diarrhea. vEEG started. SQH 5000q8 tonight.   10/4: BD 4, albumn bolus, incr. LR to 80 2/2 incr. in Cr, LR to 100cc/hr for uptrending Cr. Started 7% hypersal for 48hrs and SL atropine for inline/oral thick secretions. Dc'd CTX and started ancef for MSSA in the sputum. Nephrology consulted for CKD, f/u recs. SBP 170s, given hydralazine 10mg IVP.   10/5: BD5, o/n 10mg IVP hydralazine given for SBP 170s and started on hydralazine 25q8 via OGT. 10mg IV push labetalol for SBP > 160s. RT placed for diarrhea.   10/6: BD6, o/n FW increased to 350q4 per nephrology recs. IV tylenol for temp 100.6, SBp 160s presumed uncomfortable.   10/7: BD7, overnight pancultured for temp 101.8F.   10/8: BD8. GEORGE. Cr bumped. decreased LR to 75cc/hr. Adding simethicone ATC. incr hydralazine 50mgTID. Incr labetalol 300mgTID. Na 145, decreased FWF to 250q6. Start precedex. FENa consistent with intrinsic kidney injury. Pend repeat renal US. Retaining up to 1.3L, bladder scans q6, straight cath PRN  10/9: BD 9. GEORGE overnight. Neuro stable. abd xray for distention w non-specific gas pattern, OGT to LIWS for morning. duonebs/mucomyst to q8 for improving secretions. Changed tube feeds to Jevity 1.5 20cc/hr, low rate due to abdominal distention, nepro dense and more difficult to digest. Tolerating CPAP, confirmed by ABG.   10/10: BD 10. GEORGE overnight. Neuro stable. (+) gabriel for urinary retention on bladder scan. inc TF to goal rate of 40cc/hr. family leaning toward pursuing trach/PEG. 1/2 amp for FS 81.   10/11: BD 11. GEORGE overnight. Neuro stable. Trach/PEG consults placed.   10/12: BD 12. GEORGE overnight. Neuro stable. MRI brain complete.   10/13: BD 13. Increase flomax. Hold SQH after PM dose for trach tm. IVL.   10/14: BD 14. GEORGE overnight, remains on AC/VC. Gabriel placed for urinary retention. Dc'd free water.  S/p trach with pulm. NGT placed and CXR confirmed in good position.   10/15: BD 15, GEORGE ovn. resumed feeds. spiked 101, pan cx sent.   10/16: BD 16. GEORGE ovn. Lokelma 5mg for K+ 5.4. Started vanc q 24/zosyn for empiric PNA coverage, IVF to 100/hr. PEG held for fever.   10/17: Na low, ordered serum osm and urine osm for am. Started sinemet for neurostimulation. Increased cardura to 0.8. Started FW 100q4, dc'd IVF.   10/18: BD 18, GEORGE overnight, neuro stable. Amantadine added for neurostim.   10/19: BD 19, GEORGE ovn. cardura 2mg added for retention. labetalol decreased 200q8, hydralazine decreased 25q8. Gabriel replaced.   10/20: BD20, GEORGE overnight. NGT dislodged, replaced.   10/21: BD 21. POD0 PEG placement with Gen surg. decr labetolol to 50q8, incr. cardura to 0.4, started lokelma and phoslo, dc gabriel POD0 PEG placement with Gen surg.  10/22: BD 22. Plan to start TF today via PEG. dc labetalol, Following ophtho recs. Increased apnea settings - found to be in cheyne-moe respiration. CPAPing 5/5    MEDICATIONS  (STANDING):  acetylcysteine 10%  Inhalation 4 milliLiter(s) Inhalation every 12 hours  albuterol/ipratropium for Nebulization 3 milliLiter(s) Nebulizer every 12 hours  amantadine 100 milliGRAM(s) Oral <User Schedule>  amLODIPine   Tablet 10 milliGRAM(s) Oral daily  ampicillin/sulbactam  IVPB 3 Gram(s) IV Intermittent <User Schedule>  artificial tears (preservative free) Ophthalmic Solution 1 Drop(s) Right EYE every 2 hours  calcium acetate 667 milliGRAM(s) Oral three times a day with meals  carbidopa/levodopa  25/100 1 Tablet(s) Oral <User Schedule>  chlorhexidine 2% Cloths 1 Application(s) Topical <User Schedule>  doxazosin 4 milliGRAM(s) Oral at bedtime  erythromycin   Ointment 1 Application(s) Right EYE every 4 hours  heparin   Injectable 5000 Unit(s) SubCutaneous every 8 hours  hydrALAZINE 25 milliGRAM(s) Oral every 8 hours  ofloxacin 0.3% Solution 1 Drop(s) Right EYE four times a day  pantoprazole  Injectable 40 milliGRAM(s) IV Push daily  petrolatum Ophthalmic Ointment 1 Application(s) Both EYES two times a day  polyethylene glycol 3350 17 Gram(s) Oral two times a day  senna 2 Tablet(s) Oral at bedtime  sodium chloride 0.9%. 1000 milliLiter(s) (80 mL/Hr) IV Continuous <Continuous>  sodium zirconium cyclosilicate 5 Gram(s) Oral daily  tamsulosin 0.8 milliGRAM(s) Oral at bedtime    MEDICATIONS  (PRN):  acetaminophen   Oral Liquid .. 650 milliGRAM(s) Oral every 6 hours PRN Temp greater or equal to 38C (100.4F), Mild Pain (1 - 3)  bisacodyl Suppository 10 milliGRAM(s) Rectal daily PRN Constipation  oxyCODONE    IR 5 milliGRAM(s) Oral every 4 hours PRN Mild Pain (1 - 3)    Physical Exam  ICU Vital Signs Last 24 Hrs  T(C): 37.7 (22 Oct 2024 14:04), Max: 37.7 (22 Oct 2024 13:00)  T(F): 99.9 (22 Oct 2024 14:04), Max: 99.9 (22 Oct 2024 13:00)  HR: 79 (22 Oct 2024 18:00) (79 - 99)  BP: 137/87 (22 Oct 2024 18:00) (124/81 - 137/87)  BP(mean): 104 (22 Oct 2024 18:00) (97 - 104)  ABP: 144/82 (22 Oct 2024 07:00) (138/74 - 156/83)  ABP(mean): 103 (22 Oct 2024 07:00) (98 - 110)  RR: 10 (22 Oct 2024 18:00) (9 - 18)  SpO2: 100% (22 Oct 2024 18:00) (96% - 100%)    O2 Parameters below as of 22 Oct 2024 18:00  Patient On (Oxygen Delivery Method): ventilator, CPAP    O2 Concentration (%): 40        Mode: CPAP with PS  FiO2: 40  PEEP: 5  PS: 5  MAP: 6.6  PIP: 10  GA: no apparent distress  HEENT right eye conjunctival injection and corneal abrasion, neck supple, tracheostomy in place   RESP: No respiratory distress, CTA b/l, no WRR  CV: RRR, +S1S2  GI: Soft, NT, ND  Extr: non edematous, peripheral pulses present symmetric   NEURO:   Mental Status: awake/eyes open, maintaining eyes opening spontaneously intermittent downward gaze/ocular bobbing, not  following commands (attempted to ask to look downward or wiggle fingers in [place]ian)  CNs: Disconjugated gaze, left eye some abduction and adduction movements noted, spontaneous vs. purposeful downward intermittent eye movement, pupils equally round and reactive to light right larger than left, corneal + L, trace on right,  cough ++, breathing over vent  Motor: head/neck movement towards noxious, extensor posturing to noxious at the bilateral upper extremities, triple flexion at the bilateral lower extremities     ASSESSMENT/PLAN  45 y/o PMH ?stroke/MI present to [facility] after collapsing at work. Decorticate posturing, vomiting, intubated for airway protection. 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 Transferred to [facility] for further management. s/p trach 10/14. Course complicated by persistent encephalopathy/wakeful unresponsiveness, respiratory failure, VAP.     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   Data   - CTA head and neck 09/30: Re-demonstration of pontine hemorrhage with associated edema and mass effect upon the fourth ventricle and cerebral aqueduct. No underlying no malformation is identified. No increase in ventricular size since the   prior study.  - CT head 10/03: Again noted is a parenchymal hematoma in the nabil, with associated  expansion and surrounding vasogenic edema. There is mild spillage into the subarachnoid spaces at the cistern and within sulci in the right frontal lobe. All of these findings appear stable, without interval rebleeding. There is a chronic hemorrhagic infarct in the left external capsule/corona radiata. There is a small chronic white matter infarct vs small vessel disease in the right corona radiata. Moderate generalized cerebral volume loss, with distention of the sulci and concomitant ex-vacuo ventricular dilatation. Mild nonspecific low attenuation in the periventricular and subcortical white matter. No midline shift or herniation. No CT evidence of acute territorial infarction, although MRI with DWI would be more sensitive. Limited views of the sinuses and mastoids show mild mucosal thickening without air-fluid levels, likely chronic. An ETT is noted in place. Limited views of the orbits and visualized soft tissues of the neck, face, scalp, skull base, and calvarium are otherwise unremarkable. IMPRESSION: No significant change, without interval rebleeding.  - MRI brain 10/12: Similar-appearing evolving acute parenchymal hemorrhage within the nabil with surrounding mass effect and edema. No underlying enhancement. No new areas of acute intracranial hemorrhage. Wedge-shaped acute/subacute infarct within the right lateral cerebellar hemisphere. Unchanged encephalomalacia and gliosis within the left external capsule related to remote hemorrhage in this location.  - vEEG 10/17: with predominant delta slowing with superimposed geo and beta/some reactivity present     Plan  Neuro check q4 and vitals q1  BP goal 100-160, MAP > 65  Sinemet and Amantadine to promote awakening   STAT CT head for any acute neurological change   Off sedation  PRN fentanyl for vent synchrony/ discomfort   Plan for long term vent rehab facility   Opthalmology consult given keratitis       CV   Diagnosis: Uncontrolled hypertension at presentation; now episodes of hypotension  Data  HR: 79 (22 Oct 2024 18:00) (79 - 99)  BP: 137/87 (22 Oct 2024 18:00) (124/81 - 137/87)  BP(mean): 104 (22 Oct 2024 18:00) (97 - 104)  ABP: 144/82 (22 Oct 2024 07:00) (138/74 - 156/83)  ABP(mean): 103 (22 Oct 2024 07:00) (98 - 110)    ECHO 9/30 hyperdynamic left ventricular function EF 70%  Plan  SBP goal 100-150, MAP > 65  Continue Hydralazine  25q8  Continue amlodipine 10 mg daily   Telemetry monitoring    PULM   Diagnosis: Intubated for airway protection in the setting of acute encephalopathy; persistent respiratory failure; S/P tracheostomy; VAP,  tolerating PS trials  Data  RR: 10 (22 Oct 2024 18:00) (9 - 18)  SpO2: 100% (22 Oct 2024 18:00) (96% - 100%)  O2 Parameters below as of 22 Oct 2024 18:00  Patient On (Oxygen Delivery Method): ventilator, CPAP  O2 Concentration (%): 40    Mode: CPAP with PS  FiO2: 40  PEEP: 5  PS: 5  MAP: 6.6  PIP: 10    Chest x-ray 10/17: Similar to prior exam 10/16/2024 with right lung base partial atelectasis. Nasogastric tube tip below hemidiaphragm. No pneumothorax. No acute infiltrate appearing. Possible minimal right pleural effusion. No left pleural effusion.  Chest x-ray 10/21:  Two views of chest obtained at bedside. NG tube in stomach. Small discoid atelectasis right perihilar region. No pleural effusion.  Cardia mediastinal silhouette unremarkable. Degenerative changes of spine.  IMPRESSION: Small atelectasis right perihilar region is unchanged.      Plan   PS trials --->plan to start  trach collar trial as tolerated   Pulm toilet/chest PT q4/Nebs  pulse-oxymetry monitoring   Antibiotics for VAP    RENAL   Diagnosis: ANIKA on CKD unknown baseline) - - > stable; urinary retention requiring frequent straight cath required Gabriel replacement; intermittent hyperkalemia   Data  10-22    140  |  110[H]  |  44[H]  ----------------------------<  94  5.5[H]   |  20[L]  |  3.18[H]    Ca    9.0      22 Oct 2024 07:41  Phos  4.8     10-22  Mg     2.1     10-22    I&O's Summary    21 Oct 2024 07:01  -  22 Oct 2024 07:00  --------------------------------------------------------  IN: 2860 mL / OUT: 3855 mL / NET: -995 mL    22 Oct 2024 07:01  -  22 Oct 2024 18:22  --------------------------------------------------------  IN: 940 mL / OUT: 1400 mL / NET: -460 mL    Plan   Euvolemia/normonatremia goal  KVO  FWF   Monitor for hyperkalemia/monitor I/O  bladder scanner q4 hours + straight cath OK N given urinary retention  Tamsulosin   Doxazosin   Renal following   Avoid nephrotoxic medications    ID  Diagnosis: Febrile, normal white count/ Fever and hypoxia on 10/15 c/w Ventilator Associated PNA ++Acitonobacter B. ++MSSA   Data  ICU Vital Signs Last 24 Hrs  T(C): 37.7 (22 Oct 2024 14:04), Max: 37.7 (22 Oct 2024 13:00)                        9.0    6.25  )-----------( 351      ( 22 Oct 2024 07:41 )             28.6     Blood Cx 10/15: NGTD   Sputum Cx 10/16: Acinetobacter B.   Procalcitonin 10/16: 0.36 <-- 0.13   Plan   - S/P Ancef --> restarted on broad spectrum antibiotics on 10/16 Vancomycin and Zosyn --> given sensitivity narrow to Unasyn (renally dosed) continue for 7-day course (end day 10/23) for VAP  Follow-up cultures results  monitor WBC and fever curve   culture if spikes 38.3C    GI  Diagnosis: Dysphagia secondary to encephalopathy/hemorrhagic stroke pending PEG placement, constipation   Data  Last Bowel Movement: 21-Oct-2024 (10-22-24 @ 08:00)  Plan   S/p PEG 10/21  Started enteral feeding via PEG  Free water flashes 150 q4 (KVO)  GI prophylaxis while intubated   Bowel regimen + suppository     HEME/ONC  Diagnosis: thrombocytosis now resolved   Data                        9.0    6.25  )-----------( 351      ( 22 Oct 2024 07:41 )             28.6     LE venous ultrasound 10/19: negative for DVT     Plan   Monitor H&H  Hbg goal > 7.0, PLT > 100  SDCs + heparin SQ for DVT prophylaxis    ENDO  Diagnosis: no active issues   Data  A1c: 5.3    CAPILLARY BLOOD GLUCOSE  POCT Blood Glucose.: 89 mg/dL (22 Oct 2024 15:38)  POCT Blood Glucose.: 86 mg/dL (22 Oct 2024 11:12)  POCT Blood Glucose.: 94 mg/dL (21 Oct 2024 18:37)  Plan:   Goal euglycemia (-180)  Avoid hypoglycemic episodes      CODE STATUS: FULL CODE  Patient's son (Chauncey Reyes) updated at bedside with . He understands the current clinical condition of his father and would like for him to continue receiving full life sustaining measures.     DISPO:   NeuroICU        Jazlyn Tomlin MD   Neurocritical Care Attending                Neurocritical Care Attending Note     GARETT REYES   MRN-[id number]  Summary:  44y/M  unknown past medical history (reported stroke and MI by coworkers) presented to [facility] with AMS, Pt was working at [employer] when he was found down by coworkers. EMS called and pt brought to [facility] ED. Intubated, sedated, started on cardene for SBPs in 200s. CT head showed brain stem bleed. Transferred to NSICU for further management.  (30 Sep 2024 12:55)    Hospital Course/Interval Events  9/30: transferred from [facility]. A line placed. Versed dc'd. Roomate Prasanth at bedside, states pt has family in [place], cannot confirm medications or PMH other than stroke and MI. 250cc bolus 3% given. LR switched to NS. hydralazine 25q8 started, 3% started, switched propofol to precedex   10/1: stability CTH done. Added labetalol, started TF. Palliative consulted. ethics consulted to determine surrogate. febrile 103, pan cx sent  10/2: BD 2, GEORGE overnight. TF resumed. Desatt'd to 80s, FiO2 inc. to 50. Fentanyl given, ABG, CXR ordered. Maxxed on precedex, started on propofol for DARIEN -4 - -5. Precedex dc'd. Duonebs, mucomyst, hypertonic added. 3% dc'd. Cardene dc'd. Start vanc/CTX. Increased labetalol 200q8. MRSA negative, dc'd vanc. ETT pulled back 2cm x 2, good positioning after confirmatory chest xray. Ethics attempting to establish HCP with family. Na 159, starting FW 250q6 for range 150-155.   10/3: BD3, GEORGE o/n, neuro stable. Na elevating, FW increased to 300q6. Dc'd bowel reg for diarrhea. vEEG started. SQH 5000q8 tonight.   10/4: BD 4, albumn bolus, incr. LR to 80 2/2 incr. in Cr, LR to 100cc/hr for uptrending Cr. Started 7% hypersal for 48hrs and SL atropine for inline/oral thick secretions. Dc'd CTX and started ancef for MSSA in the sputum. Nephrology consulted for CKD, f/u recs. SBP 170s, given hydralazine 10mg IVP.   10/5: BD5, o/n 10mg IVP hydralazine given for SBP 170s and started on hydralazine 25q8 via OGT. 10mg IV push labetalol for SBP > 160s. RT placed for diarrhea.   10/6: BD6, o/n FW increased to 350q4 per nephrology recs. IV tylenol for temp 100.6, SBp 160s presumed uncomfortable.   10/7: BD7, overnight pancultured for temp 101.8F.   10/8: BD8. GEORGE. Cr bumped. decreased LR to 75cc/hr. Adding simethicone ATC. incr hydralazine 50mgTID. Incr labetalol 300mgTID. Na 145, decreased FWF to 250q6. Start precedex. FENa consistent with intrinsic kidney injury. Pend repeat renal US. Retaining up to 1.3L, bladder scans q6, straight cath PRN  10/9: BD 9. GEORGE overnight. Neuro stable. abd xray for distention w non-specific gas pattern, OGT to LIWS for morning. duonebs/mucomyst to q8 for improving secretions. Changed tube feeds to Jevity 1.5 20cc/hr, low rate due to abdominal distention, nepro dense and more difficult to digest. Tolerating CPAP, confirmed by ABG.   10/10: BD 10. GEORGE overnight. Neuro stable. (+) gabriel for urinary retention on bladder scan. inc TF to goal rate of 40cc/hr. family leaning toward pursuing trach/PEG. 1/2 amp for FS 81.   10/11: BD 11. GEORGE overnight. Neuro stable. Trach/PEG consults placed.   10/12: BD 12. GEORGE overnight. Neuro stable. MRI brain complete.   10/13: BD 13. Increase flomax. Hold SQH after PM dose for trach tm. IVL.   10/14: BD 14. GEORGE overnight, remains on AC/VC. Gabriel placed for urinary retention. Dc'd free water.  S/p trach with pulm. NGT placed and CXR confirmed in good position.   10/15: BD 15, GEORGE ovn. resumed feeds. spiked 101, pan cx sent.   10/16: BD 16. GEORGE ovn. Lokelma 5mg for K+ 5.4. Started vanc q 24/zosyn for empiric PNA coverage, IVF to 100/hr. PEG held for fever.   10/17: Na low, ordered serum osm and urine osm for am. Started sinemet for neurostimulation. Increased cardura to 0.8. Started FW 100q4, dc'd IVF.   10/18: BD 18, GEORGE overnight, neuro stable. Amantadine added for neurostim.   10/19: BD 19, GEORGE ovn. cardura 2mg added for retention. labetalol decreased 200q8, hydralazine decreased 25q8. Gabriel replaced.   10/20: BD20, GEORGE overnight. NGT dislodged, replaced.   10/21: BD 21. POD0 PEG placement with Gen surg. decr labetolol to 50q8, incr. cardura to 0.4, started lokelma and phoslo, dc gabriel POD0 PEG placement with Gen surg.  10/22: BD 22. Plan to start TF today via PEG. dc labetalol, Following ophtho recs. Increased apnea settings - found to be in cheyne-moe respiration. CPAPing 5/5    MEDICATIONS  (STANDING):  acetylcysteine 10%  Inhalation 4 milliLiter(s) Inhalation every 12 hours  albuterol/ipratropium for Nebulization 3 milliLiter(s) Nebulizer every 12 hours  amantadine 100 milliGRAM(s) Oral <User Schedule>  amLODIPine   Tablet 10 milliGRAM(s) Oral daily  ampicillin/sulbactam  IVPB 3 Gram(s) IV Intermittent <User Schedule>  artificial tears (preservative free) Ophthalmic Solution 1 Drop(s) Right EYE every 2 hours  calcium acetate 667 milliGRAM(s) Oral three times a day with meals  carbidopa/levodopa  25/100 1 Tablet(s) Oral <User Schedule>  chlorhexidine 2% Cloths 1 Application(s) Topical <User Schedule>  doxazosin 4 milliGRAM(s) Oral at bedtime  erythromycin   Ointment 1 Application(s) Right EYE every 4 hours  heparin   Injectable 5000 Unit(s) SubCutaneous every 8 hours  hydrALAZINE 25 milliGRAM(s) Oral every 8 hours  ofloxacin 0.3% Solution 1 Drop(s) Right EYE four times a day  pantoprazole  Injectable 40 milliGRAM(s) IV Push daily  petrolatum Ophthalmic Ointment 1 Application(s) Both EYES two times a day  polyethylene glycol 3350 17 Gram(s) Oral two times a day  senna 2 Tablet(s) Oral at bedtime  sodium chloride 0.9%. 1000 milliLiter(s) (80 mL/Hr) IV Continuous <Continuous>  sodium zirconium cyclosilicate 5 Gram(s) Oral daily  tamsulosin 0.8 milliGRAM(s) Oral at bedtime    MEDICATIONS  (PRN):  acetaminophen   Oral Liquid .. 650 milliGRAM(s) Oral every 6 hours PRN Temp greater or equal to 38C (100.4F), Mild Pain (1 - 3)  bisacodyl Suppository 10 milliGRAM(s) Rectal daily PRN Constipation  oxyCODONE IR 5 milliGRAM(s) Oral every 4 hours PRN Mild Pain (1 - 3)    Physical Exam  ICU Vital Signs Last 24 Hrs  T(C): 37.7 (22 Oct 2024 14:04), Max: 37.7 (22 Oct 2024 13:00)  T(F): 99.9 (22 Oct 2024 14:04), Max: 99.9 (22 Oct 2024 13:00)  HR: 79 (22 Oct 2024 18:00) (79 - 99)  BP: 137/87 (22 Oct 2024 18:00) (124/81 - 137/87)  BP(mean): 104 (22 Oct 2024 18:00) (97 - 104)  ABP: 144/82 (22 Oct 2024 07:00) (138/74 - 156/83)  ABP(mean): 103 (22 Oct 2024 07:00) (98 - 110)  RR: 10 (22 Oct 2024 18:00) (9 - 18)  SpO2: 100% (22 Oct 2024 18:00) (96% - 100%)    O2 Parameters below as of 22 Oct 2024 18:00  Patient On (Oxygen Delivery Method): ventilator, CPAP    O2 Concentration (%): 40    Mode: CPAP with PS  FiO2: 40  PEEP: 5  PS: 5  MAP: 6.6  PIP: 10  GA: no apparent distress  HEENT right eye conjunctival injection and corneal abrasion, neck supple, tracheostomy in place   RESP: No respiratory distress, CTA b/l, no WRR  CV: RRR, +S1S2  GI: Soft, NT, ND  Extr: non edematous, peripheral pulses present symmetric   NEURO:   Mental Status: awake/eyes open, maintaining eyes opening spontaneously intermittent downward gaze/ocular bobbing, can track vertically, appears to have impaired horizontal bilateral eyes movement - intermittently following commands (wiggling toes on right), and mimicking thumb up.  CNs: Disconjugated gaze, left eye some abduction and adduction movements noted, spontaneous vs. purposeful downward intermittent eye movement, pupils equally round and reactive to light right larger than left, corneal + L, trace on right,  cough ++, breathing over vent  Motor: head/neck movement towards noxious, extensor posturing to noxious at the bilateral upper extremities, triple flexion at the bilateral lower extremities     ASSESSMENT/PLAN  45 y/o PMH ?stroke/MI present to [facility] after collapsing at work. Decorticate posturing, vomiting, intubated for airway protection. 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 Transferred to [facility] for further management. s/p trach 10/14. Course complicated by persistent encephalopathy/wakeful unresponsiveness, respiratory failure, VAP.     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   Data   - CTA head and neck 09/30: Re-demonstration of pontine hemorrhage with associated edema and mass effect upon the fourth ventricle and cerebral aqueduct. No underlying no malformation is identified. No increase in ventricular size since the   prior study.  - CT head 10/03: Again noted is a parenchymal hematoma in the nabil, with associated  expansion and surrounding vasogenic edema. There is mild spillage into the subarachnoid spaces at the cistern and within sulci in the right frontal lobe. All of these findings appear stable, without interval rebleeding. There is a chronic hemorrhagic infarct in the left external capsule/corona radiata. There is a small chronic white matter infarct vs small vessel disease in the right corona radiata. Moderate generalized cerebral volume loss, with distention of the sulci and concomitant ex-vacuo ventricular dilatation. Mild nonspecific low attenuation in the periventricular and subcortical white matter. No midline shift or herniation. No CT evidence of acute territorial infarction, although MRI with DWI would be more sensitive. Limited views of the sinuses and mastoids show mild mucosal thickening without air-fluid levels, likely chronic. An ETT is noted in place. Limited views of the orbits and visualized soft tissues of the neck, face, scalp, skull base, and calvarium are otherwise unremarkable. IMPRESSION: No significant change, without interval rebleeding.  - MRI brain 10/12: Similar-appearing evolving acute parenchymal hemorrhage within the nabil with surrounding mass effect and edema. No underlying enhancement. No new areas of acute intracranial hemorrhage. Wedge-shaped acute/subacute infarct within the right lateral cerebellar hemisphere. Unchanged encephalomalacia and gliosis within the left external capsule related to remote hemorrhage in this location.  - vEEG 10/17: with predominant delta slowing with superimposed geo and beta/some reactivity present     Plan  Neuro check q4 and vitals q1  BP goal 100-160, MAP > 65  Sinemet and Amantadine to promote awakening   STAT CT head for any acute neurological change   Off sedation  Oxycodone/Tylenol for pain PRN  Plan for long term vent rehab facility   Opthalmology consult given keratitis       CV   Diagnosis: Uncontrolled hypertension at presentation; now episodes of hypotension  Data  HR: 79 (22 Oct 2024 18:00) (79 - 99)  BP: 137/87 (22 Oct 2024 18:00) (124/81 - 137/87)  BP(mean): 104 (22 Oct 2024 18:00) (97 - 104)  ABP: 144/82 (22 Oct 2024 07:00) (138/74 - 156/83)  ABP(mean): 103 (22 Oct 2024 07:00) (98 - 110)    ECHO 9/30 hyperdynamic left ventricular function EF 70%  Plan  SBP goal 100-150, MAP > 65  Continue Hydralazine  25q8  Continue amlodipine 10 mg daily   Telemetry monitoring    PULM   Diagnosis: Intubated for airway protection in the setting of acute encephalopathy; persistent respiratory failure; S/P tracheostomy; VAP,  tolerating PS trials consistently   Data  RR: 10 (22 Oct 2024 18:00) (9 - 18)  SpO2: 100% (22 Oct 2024 18:00) (96% - 100%)  O2 Parameters below as of 22 Oct 2024 18:00  Patient On (Oxygen Delivery Method): ventilator, CPAP  O2 Concentration (%): 40    Mode: CPAP with PS  FiO2: 40  PEEP: 5  PS: 5  MAP: 6.6  PIP: 10  Chest x-ray 10/17: Similar to prior exam 10/16/2024 with right lung base partial atelectasis. Nasogastric tube tip below hemidiaphragm. No pneumothorax. No acute infiltrate appearing. Possible minimal right pleural effusion. No left pleural effusion.  Chest x-ray 10/21:  Two views of chest obtained at bedside. NG tube in stomach. Small discoid atelectasis right perihilar region. No pleural effusion.  Cardia mediastinal silhouette unremarkable. Degenerative changes of spine.  IMPRESSION: Small atelectasis right perihilar region is unchanged.    Plan   PS trials --->plan to start  trach collar trial as tolerated   Pulm toilet/chest PT q4/Nebsq12  pulse-oxymetry monitoring   Antibiotics for VAP    RENAL   Diagnosis: ANIKA on CKD (unknown baseline) - - > stable; urinary retention requiring frequent straight cath required Gabriel replacement; intermittent hyperkalemia   Data  10-22    140  |  110[H]  |  44[H]  ----------------------------<  94  5.5[H]   |  20[L]  |  3.18[H]    Ca    9.0      22 Oct 2024 07:41  Phos  4.8     10-22  Mg     2.1     10-22    I&O's Summary    21 Oct 2024 07:01  -  22 Oct 2024 07:00  --------------------------------------------------------  IN: 2860 mL / OUT: 3855 mL / NET: -995 mL    22 Oct 2024 07:01  -  22 Oct 2024 18:22  --------------------------------------------------------  IN: 940 mL / OUT: 1400 mL / NET: -460 mL    Plan   Euvolemia/normonatremia goal  KVO   q6   Monitor for hyperkalemia/monitor I/o  Repeat BMP and EKG ---> if K still elevated/EKG changes treat as per protocol  bladder scanner q4 hours + straight cath PRN given urinary retention  Tamsulosin   Doxazosin   Renal following   Avoid nephrotoxic medications    ID  Diagnosis: Febrile, normal white count/ Fever and hypoxia on 10/15 c/w Ventilator Associated PNA ++Acitonobacter B. ++MSSA   Data  ICU Vital Signs Last 24 Hrs  T(C): 37.7 (22 Oct 2024 14:04), Max: 37.7 (22 Oct 2024 13:00)                        9.0    6.25  )-----------( 351      ( 22 Oct 2024 07:41 )             28.6     Blood Cx 10/15: NGTD   Sputum Cx 10/16: Acinetobacter B.   Procalcitonin 10/16: 0.36 <-- 0.13   Plan   - S/P Ancef --> restarted on broad spectrum antibiotics on 10/16 Vancomycin and Zosyn --> given sensitivity narrow to Unasyn (renally dosed) continue for 7-day course (end day 10/23) for VAP  monitor WBC and fever curve   culture if spikes 38.3C    GI  Diagnosis: Dysphagia secondary to encephalopathy/hemorrhagic stroke s/p PEG placement (10/21 by Surgery)    Data  Last Bowel Movement: 21-Oct-2024 (10-22-24 @ 08:00)  Plan   S/p PEG 10/21  Started enteral feeding via PEG - at goal   Free water flashes 100 q6  (KVO)  GI prophylaxis while intubated   Bowel regimen + suppository     HEME/ONC  Diagnosis: thrombocytosis now resolved   Data                        9.0    6.25  )-----------( 351      ( 22 Oct 2024 07:41 )             28.6     LE venous ultrasound 10/19: negative for DVT     Plan   Monitor H&H  Hbg goal > 7.0, PLT > 100  SDCs + heparin 5000 q8 for DVT prophylaxis    ENDO  Diagnosis: relative hypoglycemia   Data  A1c: 5.3    CAPILLARY BLOOD GLUCOSE  POCT Blood Glucose.: 89 mg/dL (22 Oct 2024 15:38)  POCT Blood Glucose.: 86 mg/dL (22 Oct 2024 11:12)  POCT Blood Glucose.: 94 mg/dL (21 Oct 2024 18:37)  Plan:   Goal euglycemia (-180)  Avoid hypoglycemic episodes      CODE STATUS: FULL CODE  Patient's son (Chauncey Reyes) updated at bedside with . He understands the current clinical condition of his father and would like for him to continue receiving full life sustaining measures.     DISPO:   NeuroICU  Tentative plan to transfer to Vent step down unit when able tolerate trach collar for 24-48 hours        Jazlyn Tomlin MD   Neurocritical Care Attending

## 2024-10-22 NOTE — PROGRESS NOTE ADULT - ASSESSMENT
44y/M with  large pontine hemorrhage, SAH, brain edema; ?locked-in  CVA  acute MI  acute on chronic CKD, hydronephrosis    PLAN:   NEURO: neurochecks q4h, VSq1 PRN pain meds with acetaminophen; fentnayl for PEG  neurostim: sinemet / amantadine  palliative / ethics follow-up  off VEEG - neg  REHAB:  physical therapy evaluation and management    EARLY MOB:      PULM:  s/p trach, cont full vent support and CPAP weaning as tolerated, pulmo toilette  CARDIO:  SBP goal 100-160mm Hg, labetalol / hydralazine, amlodipine; decrease labetalol  ENDO:  Blood sugar goals 140-180 mg/dL, A1C 5.4  GI:  PPI for GI prophylaxis; PEG plans, bowel regimen  DIET: NPO for PEG  RENAL:  Na goal 135-145, Vuong, tamsulosin / increase doxazosin to 4; IVF while NPO; TOV; decrease FW to 100 q6h, continue phosphate binder and Lokelma  HEM/ONC:  Hb stable  VTE Prophylaxis: SCDs, SQH  ID: afebrile, no leukocytosis; on Unasyn for A. baumanii, MSSA+ (last day 10/23)  Social: will update family  MISC: palliative discussions with family     Active issues:  What's keeping patient in the ICU? vented  What is this patient's dispo plan? wean to trach collar and stepdown vs if unable to - will stepdown to vent bed    ATTENDING ATTESTATION:  I was physically present for the key portions of the evaluation and management (E/M) service provided.  I agree with the above history, physical and plan, which I have reviewed and edited where appropriate.    Patient at high risk for neurological deterioration or death due to:  ICU delirium, aspiration PNA, DVT / PE.  Critical care time:  I have personally provided 60 minutes of critical care time, excluding time spent on separate procedures.      Plan discussed with RN, house staff. 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  large pontine hemorrhage, SAH, brain edema; ?locked-in  CVA  acute MI  acute on chronic CKD, hydronephrosis    PLAN:   NEURO: neurochecks q4h, VSq1 PRN pain meds with acetaminophen  neurostim: sinemet / amantadine  palliative / ethics follow-up  off VEEG - neg  REHAB:  physical therapy evaluation and management    EARLY MOB:  bedrest HOB up    PULM:  s/p trach, cont full vent support and CPAP 10/5 40%, weaning as tolerated, pulmo toilette  CARDIO:  SBP goal 100-160mm Hg, hydralazine, amlodipine; d/c labetalol  ENDO:  Blood sugar goals 140-180 mg/dL, A1C 5.4  GI:  PPI for GI prophylaxis; s/p PEG plans, bowel regimen  DIET: NPO, start TF today  RENAL:  Na goal 135-145, Vuong, tamsulosin / doxazosin 4; IVL once TF started, cont keep FW to 100 q6h, continue phosphate binder and Lokelma  HEM/ONC:  Hb stable  VTE Prophylaxis: SCDs, SQH  ID: afebrile, no leukocytosis; on Unasyn for A. baumanii, MSSA+ (last day 10/23)  Social: will update family  MISC: palliative discussions with family     Active issues:  What's keeping patient in the ICU? vented  What is this patient's dispo plan? wean to trach collar and stepdown vs if unable to - will stepdown to vent bed    ATTENDING ATTESTATION:  I was physically present for the key portions of the evaluation and management (E/M) service provided.  I agree with the above history, physical and plan, which I have reviewed and edited where appropriate.    Patient at high risk for neurological deterioration or death due to:  ICU delirium, aspiration PNA, DVT / PE.  Critical care time:  I have personally provided 60 minutes of critical care time, excluding time spent on separate procedures.      Plan discussed with RN, house staff. 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  large pontine hemorrhage, SAH, brain edema; ?locked-in  CVA  h/o MI  acute on chronic CKD, hydronephrosis    PLAN:   NEURO: neurochecks q4h, VSq1 PRN pain meds with acetaminophen  neurostim: sinemet / amantadine  palliative / ethics follow-up  off VEEG - neg  REHAB:  physical therapy evaluation and management    EARLY MOB:  bedrest HOB up    PULM:  s/p trach, cont full vent support and CPAP 10/5 40%, weaning as tolerated, pulmo toilette  CARDIO:  SBP goal 100-160mm Hg, hydralazine, amlodipine; d/c labetalol  ENDO:  Blood sugar goals 140-180 mg/dL, A1C 5.4  GI:  PPI for GI prophylaxis; s/p PEG plans, bowel regimen  DIET: NPO, start TF today  RENAL:  Na goal 135-145, Vuong, tamsulosin / doxazosin 4; IVL once TF started, cont keep FW to 100 q6h, continue phosphate binder and Lokelma  HEM/ONC:  Hb stable  VTE Prophylaxis: SCDs, SQH  ID: afebrile, no leukocytosis; on Unasyn for A. baumanii, MSSA+ (last day 10/23)  Social: will update family  MISC: palliative discussions with family     Active issues:  What's keeping patient in the ICU? vented  What is this patient's dispo plan? wean to trach collar and stepdown vs if unable to - will stepdown to vent bed    ATTENDING ATTESTATION:  I was physically present for the key portions of the evaluation and management (E/M) service provided.  I agree with the above history, physical and plan, which I have reviewed and edited where appropriate.    Patient at high risk for neurological deterioration or death due to:  ICU delirium, aspiration PNA, DVT / PE.  Critical care time:  I have personally provided 60 minutes of critical care time, excluding time spent on separate procedures.      Plan discussed with RN, house staff.

## 2024-10-22 NOTE — PROGRESS NOTE ADULT - SUBJECTIVE AND OBJECTIVE BOX
SUBJECTIVE:  Flatus: [ ] YES [ ] NO             Bowel Movement: [ ] YES [ ] NO  Pain (0-10):            Pain Control Adequate: [ ] YES [ ] NO  Nausea: [ ] YES [ ] NO            Vomiting: [ ] YES [ ] NO  Diarrhea: [ ] YES [ ] NO         Constipation: [ ] YES [ ] NO     Chest Pain: [ ] YES [ ] NO    SOB:  [ ] YES [ ] NO    MEDICATIONS  (STANDING):  acetylcysteine 10%  Inhalation 4 milliLiter(s) Inhalation every 12 hours  albuterol/ipratropium for Nebulization 3 milliLiter(s) Nebulizer every 12 hours  amantadine 100 milliGRAM(s) Oral <User Schedule>  amLODIPine   Tablet 10 milliGRAM(s) Oral daily  ampicillin/sulbactam  IVPB 3 Gram(s) IV Intermittent <User Schedule>  calcium acetate 667 milliGRAM(s) Oral three times a day with meals  carbidopa/levodopa  25/100 1 Tablet(s) Oral <User Schedule>  chlorhexidine 2% Cloths 1 Application(s) Topical <User Schedule>  doxazosin 4 milliGRAM(s) Oral at bedtime  erythromycin   Ointment 1 Application(s) Right EYE every 6 hours  heparin   Injectable 5000 Unit(s) SubCutaneous every 8 hours  hydrALAZINE 25 milliGRAM(s) Oral every 8 hours  labetalol 50 milliGRAM(s) Oral every 8 hours  pantoprazole  Injectable 40 milliGRAM(s) IV Push daily  petrolatum Ophthalmic Ointment 1 Application(s) Both EYES two times a day  polyethylene glycol 3350 17 Gram(s) Oral two times a day  senna 2 Tablet(s) Oral at bedtime  sodium chloride 0.9%. 1000 milliLiter(s) (80 mL/Hr) IV Continuous <Continuous>  sodium zirconium cyclosilicate 5 Gram(s) Oral daily  tamsulosin 0.8 milliGRAM(s) Oral at bedtime    MEDICATIONS  (PRN):  acetaminophen   Oral Liquid .. 650 milliGRAM(s) Oral every 6 hours PRN Temp greater or equal to 38C (100.4F), Mild Pain (1 - 3)  bisacodyl Suppository 10 milliGRAM(s) Rectal daily PRN Constipation  oxyCODONE    IR 5 milliGRAM(s) Oral every 4 hours PRN Mild Pain (1 - 3)      Vital Signs Last 24 Hrs  T(C): 37.6 (22 Oct 2024 05:16), Max: 37.6 (21 Oct 2024 17:51)  T(F): 99.7 (22 Oct 2024 05:16), Max: 99.7 (21 Oct 2024 17:51)  HR: 85 (22 Oct 2024 07:00) (74 - 99)  BP: --  BP(mean): --  RR: 17 (22 Oct 2024 07:00) (10 - 19)  SpO2: 98% (22 Oct 2024 07:00) (97% - 100%)    Parameters below as of 22 Oct 2024 07:00  Patient On (Oxygen Delivery Method): ventilator    O2 Concentration (%): 40    Physical Exam:  General: NAD, resting comfortably in bed  Pulmonary: Nonlabored breathing, no respiratory distress  Cardiovascular: NSR  Abdominal: soft, NT/ND  Extremities: WWP, normal strength  Neuro: A/O x 3, CNs II-XII grossly intact, no focal deficits, normal motor/sensation  Pulses: palpable distal pulses    I&O's Summary    21 Oct 2024 07:01  -  22 Oct 2024 07:00  --------------------------------------------------------  IN: 2860 mL / OUT: 3855 mL / NET: -995 mL    22 Oct 2024 07:01  -  22 Oct 2024 08:05  --------------------------------------------------------  IN: 80 mL / OUT: 0 mL / NET: 80 mL        LABS:                        9.0    6.25  )-----------( 351      ( 22 Oct 2024 07:41 )             28.6     10-21    137  |  108  |  48[H]  ----------------------------<  106[H]  5.2   |  20[L]  |  3.40[H]    Ca    8.7      21 Oct 2024 04:38  Phos  5.1     10-21  Mg     2.2     10-21      PT/INR - ( 21 Oct 2024 04:38 )   PT: 12.3 sec;   INR: 1.05          PTT - ( 21 Oct 2024 04:38 )  PTT:31.6 sec  Urinalysis Basic - ( 21 Oct 2024 04:38 )    Color: x / Appearance: x / SG: x / pH: x  Gluc: 106 mg/dL / Ketone: x  / Bili: x / Urobili: x   Blood: x / Protein: x / Nitrite: x   Leuk Esterase: x / RBC: x / WBC x   Sq Epi: x / Non Sq Epi: x / Bacteria: x      CAPILLARY BLOOD GLUCOSE      POCT Blood Glucose.: 94 mg/dL (21 Oct 2024 18:37)  POCT Blood Glucose.: 94 mg/dL (21 Oct 2024 12:37)        RADIOLOGY & ADDITIONAL STUDIES:       SUBJECTIVE: Patient was evaluated at bedside this AM by general surgery team. Patient is not responding to examiner during exam however able to look at examiner. Patient does not grimace on exam. Per bedside RN, no issues with meds through PEG overnight and no acute events overnight.    MEDICATIONS  (STANDING):  acetylcysteine 10%  Inhalation 4 milliLiter(s) Inhalation every 12 hours  albuterol/ipratropium for Nebulization 3 milliLiter(s) Nebulizer every 12 hours  amantadine 100 milliGRAM(s) Oral <User Schedule>  amLODIPine   Tablet 10 milliGRAM(s) Oral daily  ampicillin/sulbactam  IVPB 3 Gram(s) IV Intermittent <User Schedule>  calcium acetate 667 milliGRAM(s) Oral three times a day with meals  carbidopa/levodopa  25/100 1 Tablet(s) Oral <User Schedule>  chlorhexidine 2% Cloths 1 Application(s) Topical <User Schedule>  doxazosin 4 milliGRAM(s) Oral at bedtime  erythromycin   Ointment 1 Application(s) Right EYE every 6 hours  heparin   Injectable 5000 Unit(s) SubCutaneous every 8 hours  hydrALAZINE 25 milliGRAM(s) Oral every 8 hours  labetalol 50 milliGRAM(s) Oral every 8 hours  pantoprazole  Injectable 40 milliGRAM(s) IV Push daily  petrolatum Ophthalmic Ointment 1 Application(s) Both EYES two times a day  polyethylene glycol 3350 17 Gram(s) Oral two times a day  senna 2 Tablet(s) Oral at bedtime  sodium chloride 0.9%. 1000 milliLiter(s) (80 mL/Hr) IV Continuous <Continuous>  sodium zirconium cyclosilicate 5 Gram(s) Oral daily  tamsulosin 0.8 milliGRAM(s) Oral at bedtime    MEDICATIONS  (PRN):  acetaminophen   Oral Liquid .. 650 milliGRAM(s) Oral every 6 hours PRN Temp greater or equal to 38C (100.4F), Mild Pain (1 - 3)  bisacodyl Suppository 10 milliGRAM(s) Rectal daily PRN Constipation  oxyCODONE    IR 5 milliGRAM(s) Oral every 4 hours PRN Mild Pain (1 - 3)      Vital Signs Last 24 Hrs  T(C): 37.6 (22 Oct 2024 05:16), Max: 37.6 (21 Oct 2024 17:51)  T(F): 99.7 (22 Oct 2024 05:16), Max: 99.7 (21 Oct 2024 17:51)  HR: 85 (22 Oct 2024 07:00) (74 - 99)  BP: --  BP(mean): --  RR: 17 (22 Oct 2024 07:00) (10 - 19)  SpO2: 98% (22 Oct 2024 07:00) (97% - 100%)    Parameters below as of 22 Oct 2024 07:00  Patient On (Oxygen Delivery Method): ventilator    O2 Concentration (%): 40    Physical Exam:  General: NAD, resting comfortably in bed  Pulmonary: Nonlabored breathing, no respiratory distress on VCAC 40/430/12/5  Cardiovascular: NSR  Abdominal: soft, NT/ND, peg at 3.5cm at skin, no surrounding skin irritation, abd binder in place  Extremities: WWP  Pulses: palpable distal pulses    I&O's Summary    21 Oct 2024 07:01  -  22 Oct 2024 07:00  --------------------------------------------------------  IN: 2860 mL / OUT: 3855 mL / NET: -995 mL    22 Oct 2024 07:01  -  22 Oct 2024 08:05  --------------------------------------------------------  IN: 80 mL / OUT: 0 mL / NET: 80 mL        LABS:                        9.0    6.25  )-----------( 351      ( 22 Oct 2024 07:41 )             28.6     10-21    137  |  108  |  48[H]  ----------------------------<  106[H]  5.2   |  20[L]  |  3.40[H]    Ca    8.7      21 Oct 2024 04:38  Phos  5.1     10-21  Mg     2.2     10-21      PT/INR - ( 21 Oct 2024 04:38 )   PT: 12.3 sec;   INR: 1.05          PTT - ( 21 Oct 2024 04:38 )  PTT:31.6 sec  Urinalysis Basic - ( 21 Oct 2024 04:38 )    Color: x / Appearance: x / SG: x / pH: x  Gluc: 106 mg/dL / Ketone: x  / Bili: x / Urobili: x   Blood: x / Protein: x / Nitrite: x   Leuk Esterase: x / RBC: x / WBC x   Sq Epi: x / Non Sq Epi: x / Bacteria: x      CAPILLARY BLOOD GLUCOSE      POCT Blood Glucose.: 94 mg/dL (21 Oct 2024 18:37)  POCT Blood Glucose.: 94 mg/dL (21 Oct 2024 12:37)        RADIOLOGY & ADDITIONAL STUDIES:

## 2024-10-23 LAB
ANION GAP SERPL CALC-SCNC: 13 MMOL/L — SIGNIFICANT CHANGE UP (ref 5–17)
BASE EXCESS BLDA CALC-SCNC: -3.2 MMOL/L — LOW (ref -2–3)
BUN SERPL-MCNC: 41 MG/DL — HIGH (ref 7–23)
CALCIUM SERPL-MCNC: 9.1 MG/DL — SIGNIFICANT CHANGE UP (ref 8.4–10.5)
CHLORIDE SERPL-SCNC: 110 MMOL/L — HIGH (ref 96–108)
CO2 BLDA-SCNC: 24 MMOL/L — SIGNIFICANT CHANGE UP (ref 19–24)
CO2 SERPL-SCNC: 20 MMOL/L — LOW (ref 22–31)
CREAT SERPL-MCNC: 3.06 MG/DL — HIGH (ref 0.5–1.3)
EGFR: 25 ML/MIN/1.73M2 — LOW
EGFR: 25 ML/MIN/1.73M2 — LOW
GAS PNL BLDA: SIGNIFICANT CHANGE UP
GLUCOSE BLDC GLUCOMTR-MCNC: 123 MG/DL — HIGH (ref 70–99)
GLUCOSE BLDC GLUCOMTR-MCNC: 97 MG/DL — SIGNIFICANT CHANGE UP (ref 70–99)
GLUCOSE SERPL-MCNC: 97 MG/DL — SIGNIFICANT CHANGE UP (ref 70–99)
HCO3 BLDA-SCNC: 23 MMOL/L — SIGNIFICANT CHANGE UP (ref 21–28)
HCT VFR BLD CALC: 30.5 % — LOW (ref 39–50)
HGB BLD-MCNC: 9.3 G/DL — LOW (ref 13–17)
MAGNESIUM SERPL-MCNC: 2.4 MG/DL — SIGNIFICANT CHANGE UP (ref 1.6–2.6)
MCHC RBC-ENTMCNC: 27.8 PG — SIGNIFICANT CHANGE UP (ref 27–34)
MCHC RBC-ENTMCNC: 30.5 GM/DL — LOW (ref 32–36)
MCV RBC AUTO: 91.3 FL — SIGNIFICANT CHANGE UP (ref 80–100)
NRBC # BLD: 0 /100 WBCS — SIGNIFICANT CHANGE UP (ref 0–0)
NRBC BLD-RTO: 0 /100 WBCS — SIGNIFICANT CHANGE UP (ref 0–0)
PCO2 BLDA: 43 MMHG — SIGNIFICANT CHANGE UP (ref 35–48)
PH BLDA: 7.33 — LOW (ref 7.35–7.45)
PHOSPHATE SERPL-MCNC: 4.9 MG/DL — HIGH (ref 2.5–4.5)
PLATELET # BLD AUTO: 377 K/UL — SIGNIFICANT CHANGE UP (ref 150–400)
PO2 BLDA: 99 MMHG — SIGNIFICANT CHANGE UP (ref 83–108)
POTASSIUM SERPL-MCNC: 4.9 MMOL/L — SIGNIFICANT CHANGE UP (ref 3.5–5.3)
POTASSIUM SERPL-SCNC: 4.9 MMOL/L — SIGNIFICANT CHANGE UP (ref 3.5–5.3)
RBC # BLD: 3.34 M/UL — LOW (ref 4.2–5.8)
RBC # FLD: 12.6 % — SIGNIFICANT CHANGE UP (ref 10.3–14.5)
SAO2 % BLDA: 98.5 % — HIGH (ref 94–98)
SODIUM SERPL-SCNC: 143 MMOL/L — SIGNIFICANT CHANGE UP (ref 135–145)
WBC # BLD: 6.4 K/UL — SIGNIFICANT CHANGE UP (ref 3.8–10.5)
WBC # FLD AUTO: 6.4 K/UL — SIGNIFICANT CHANGE UP (ref 3.8–10.5)

## 2024-10-23 PROCEDURE — 36600 WITHDRAWAL OF ARTERIAL BLOOD: CPT

## 2024-10-23 PROCEDURE — 99291 CRITICAL CARE FIRST HOUR: CPT

## 2024-10-23 RX ORDER — LIPASE/PROTEASE/AMYLASE 10K-37.5K
2 CAPSULE,DELAYED RELEASE (ENTERIC COATED) ORAL ONCE
Refills: 0 | Status: COMPLETED | OUTPATIENT
Start: 2024-10-23 | End: 2024-10-23

## 2024-10-23 RX ADMIN — OFLOXACIN 1 DROP(S): 3 SOLUTION OPHTHALMIC at 17:44

## 2024-10-23 RX ADMIN — OFLOXACIN 1 DROP(S): 3 SOLUTION OPHTHALMIC at 11:09

## 2024-10-23 RX ADMIN — Medication 1 APPLICATION(S): at 06:03

## 2024-10-23 RX ADMIN — Medication 1 DROP(S): at 17:44

## 2024-10-23 RX ADMIN — AMLODIPINE BESYLATE 10 MILLIGRAM(S): 10 TABLET ORAL at 06:01

## 2024-10-23 RX ADMIN — Medication 1 DROP(S): at 07:08

## 2024-10-23 RX ADMIN — ERYTHROMYCIN 1 APPLICATION(S): 5 OINTMENT OPHTHALMIC at 17:44

## 2024-10-23 RX ADMIN — AMPICILLIN SODIUM AND SULBACTAM SODIUM 200 GRAM(S): 1; .5 INJECTION, POWDER, FOR SOLUTION INTRAMUSCULAR; INTRAVENOUS at 02:16

## 2024-10-23 RX ADMIN — Medication 40 MILLIGRAM(S): at 11:09

## 2024-10-23 RX ADMIN — Medication 667 MILLIGRAM(S): at 09:21

## 2024-10-23 RX ADMIN — Medication 1 TABLET(S): at 14:10

## 2024-10-23 RX ADMIN — Medication 2 CAPSULE(S): at 06:43

## 2024-10-23 RX ADMIN — Medication 1 DROP(S): at 03:05

## 2024-10-23 RX ADMIN — ACETYLCYSTEINE 4 MILLILITER(S): 200 INHALANT RESPIRATORY (INHALATION) at 17:28

## 2024-10-23 RX ADMIN — AMPICILLIN SODIUM AND SULBACTAM SODIUM 200 GRAM(S): 1; .5 INJECTION, POWDER, FOR SOLUTION INTRAMUSCULAR; INTRAVENOUS at 23:13

## 2024-10-23 RX ADMIN — Medication 25 MILLIGRAM(S): at 06:01

## 2024-10-23 RX ADMIN — Medication 1 DROP(S): at 13:06

## 2024-10-23 RX ADMIN — Medication 1 DROP(S): at 19:44

## 2024-10-23 RX ADMIN — Medication 1 DROP(S): at 01:29

## 2024-10-23 RX ADMIN — Medication 100 MILLIGRAM(S): at 06:02

## 2024-10-23 RX ADMIN — ERYTHROMYCIN 1 APPLICATION(S): 5 OINTMENT OPHTHALMIC at 02:16

## 2024-10-23 RX ADMIN — ACETYLCYSTEINE 4 MILLILITER(S): 200 INHALANT RESPIRATORY (INHALATION) at 06:06

## 2024-10-23 RX ADMIN — OFLOXACIN 1 DROP(S): 3 SOLUTION OPHTHALMIC at 06:04

## 2024-10-23 RX ADMIN — Medication 1 DROP(S): at 14:10

## 2024-10-23 RX ADMIN — DOXAZOSIN MESYLATE 4 MILLIGRAM(S): 8 TABLET ORAL at 23:25

## 2024-10-23 RX ADMIN — ERYTHROMYCIN 1 APPLICATION(S): 5 OINTMENT OPHTHALMIC at 09:22

## 2024-10-23 RX ADMIN — Medication 1 DROP(S): at 05:05

## 2024-10-23 RX ADMIN — IPRATROPIUM BROMIDE AND ALBUTEROL SULFATE 3 MILLILITER(S): .5; 2.5 SOLUTION RESPIRATORY (INHALATION) at 06:02

## 2024-10-23 RX ADMIN — Medication 1 APPLICATION(S): at 17:44

## 2024-10-23 RX ADMIN — AMPICILLIN SODIUM AND SULBACTAM SODIUM 200 GRAM(S): 1; .5 INJECTION, POWDER, FOR SOLUTION INTRAMUSCULAR; INTRAVENOUS at 09:21

## 2024-10-23 RX ADMIN — HEPARIN SODIUM 5000 UNIT(S): 1000 INJECTION INTRAVENOUS; SUBCUTANEOUS at 06:01

## 2024-10-23 RX ADMIN — Medication 1 DROP(S): at 09:23

## 2024-10-23 RX ADMIN — HEPARIN SODIUM 5000 UNIT(S): 1000 INJECTION INTRAVENOUS; SUBCUTANEOUS at 13:06

## 2024-10-23 RX ADMIN — HEPARIN SODIUM 5000 UNIT(S): 1000 INJECTION INTRAVENOUS; SUBCUTANEOUS at 23:16

## 2024-10-23 RX ADMIN — AMPICILLIN SODIUM AND SULBACTAM SODIUM 200 GRAM(S): 1; .5 INJECTION, POWDER, FOR SOLUTION INTRAMUSCULAR; INTRAVENOUS at 06:01

## 2024-10-23 RX ADMIN — ERYTHROMYCIN 1 APPLICATION(S): 5 OINTMENT OPHTHALMIC at 14:10

## 2024-10-23 RX ADMIN — TAMSULOSIN HYDROCHLORIDE 0.8 MILLIGRAM(S): 0.4 CAPSULE ORAL at 23:16

## 2024-10-23 RX ADMIN — Medication 1 DROP(S): at 11:00

## 2024-10-23 RX ADMIN — Medication 1 TABLET(S): at 06:02

## 2024-10-23 RX ADMIN — OFLOXACIN 1 DROP(S): 3 SOLUTION OPHTHALMIC at 23:17

## 2024-10-23 RX ADMIN — SODIUM ZIRCONIUM CYCLOSILICATE 5 GRAM(S): 5 POWDER, FOR SUSPENSION ORAL at 11:09

## 2024-10-23 RX ADMIN — AMPICILLIN SODIUM AND SULBACTAM SODIUM 200 GRAM(S): 1; .5 INJECTION, POWDER, FOR SOLUTION INTRAMUSCULAR; INTRAVENOUS at 14:10

## 2024-10-23 RX ADMIN — Medication 1 DROP(S): at 23:26

## 2024-10-23 RX ADMIN — AMPICILLIN SODIUM AND SULBACTAM SODIUM 200 GRAM(S): 1; .5 INJECTION, POWDER, FOR SOLUTION INTRAMUSCULAR; INTRAVENOUS at 17:43

## 2024-10-23 RX ADMIN — Medication 1 APPLICATION(S): at 06:01

## 2024-10-23 RX ADMIN — ERYTHROMYCIN 1 APPLICATION(S): 5 OINTMENT OPHTHALMIC at 23:16

## 2024-10-23 RX ADMIN — Medication 667 MILLIGRAM(S): at 17:43

## 2024-10-23 RX ADMIN — ERYTHROMYCIN 1 APPLICATION(S): 5 OINTMENT OPHTHALMIC at 06:04

## 2024-10-23 RX ADMIN — Medication 667 MILLIGRAM(S): at 13:06

## 2024-10-23 RX ADMIN — IPRATROPIUM BROMIDE AND ALBUTEROL SULFATE 3 MILLILITER(S): .5; 2.5 SOLUTION RESPIRATORY (INHALATION) at 17:27

## 2024-10-23 NOTE — CHART NOTE - NSCHARTNOTEFT_GEN_A_CORE
Admitting Diagnosis:   Patient is a 44y old  Male who presents with a chief complaint of brain stem bleed (23 Oct 2024 06:25)  PAST MEDICAL & SURGICAL HISTORY:  Acute myocardial infarction  CVA (cerebral vascular accident)    Current Nutrition Order:  Diet, NPO with Tube Feed:   Tube Feeding Modality: Gastrostomy  Jevity 1.5 Artemio (JEVITY1.5)  Total Volume for 24 Hours (mL): 1170  Total Number of Cans: 5  Continuous  Starting Tube Feed Rate {mL per Hour}: 20  Increase Tube Feed Rate by (mL): 10     Every 4 hours  Until Goal Tube Feed Rate (mL per Hour): 65  Tube Feed Duration (in Hours): 18  Tube Feed Start Time: 11:00  Tube Feed Stop Time: 05:00  Banatrol TF     Qty per Day:  2  Liquid Protein Supplement     Qty per Day:  2 (10-23-24 @ 09:59)      PO Intake: Good (%) [   ]  Fair (50-75%) [   ] Poor (<25%) [   ] *NPO with tube feeding [ x ]*    GI Issues: no noted GI upset per nursing and medical team; BM on 10/23/24, rectal tube noted, fecal incontinence noted; output of 1mL stool; liquid BMs; soft/nontender abdomen; rounded abdomen    Pain: nonverbal indicators of pain absent    Skin Integrity: surgical incisions remain; PEG in place; bilateral hand pitting 2+ edema; no pressure ulcers documented; Mookie: 12      10-22-24 @ 07:01  -  10-23-24 @ 07:00  --------------------------------------------------------  IN: 2440 mL / OUT: 3150 mL / NET: -710 mL    10-23-24 @ 07:01  -  10-23-24 @ 12:28  --------------------------------------------------------  IN: 450 mL / OUT: 900 mL / NET: -450 mL        Labs:   10-23    143  |  110[H]  |  41[H]  ----------------------------<  97  4.9   |  20[L]  |  3.06[H]    Ca    9.1      23 Oct 2024 05:30  Phos  4.9     10-23  Mg     2.4     10-23      CAPILLARY BLOOD GLUCOSE      POCT Blood Glucose.: 97 mg/dL (23 Oct 2024 11:47)  POCT Blood Glucose.: 89 mg/dL (22 Oct 2024 15:38)      Medications:  MEDICATIONS  (STANDING):  acetylcysteine 10%  Inhalation 4 milliLiter(s) Inhalation every 12 hours  albuterol/ipratropium for Nebulization 3 milliLiter(s) Nebulizer every 12 hours  amantadine 100 milliGRAM(s) Oral <User Schedule>  amLODIPine   Tablet 10 milliGRAM(s) Oral daily  ampicillin/sulbactam  IVPB 3 Gram(s) IV Intermittent <User Schedule>  artificial tears (preservative free) Ophthalmic Solution 1 Drop(s) Right EYE every 2 hours  calcium acetate 667 milliGRAM(s) Oral three times a day with meals  carbidopa/levodopa  25/100 1 Tablet(s) Oral <User Schedule>  chlorhexidine 2% Cloths 1 Application(s) Topical <User Schedule>  doxazosin 4 milliGRAM(s) Oral at bedtime  erythromycin   Ointment 1 Application(s) Right EYE every 4 hours  heparin   Injectable 5000 Unit(s) SubCutaneous every 8 hours  ofloxacin 0.3% Solution 1 Drop(s) Right EYE four times a day  pantoprazole  Injectable 40 milliGRAM(s) IV Push daily  petrolatum Ophthalmic Ointment 1 Application(s) Both EYES two times a day  sodium zirconium cyclosilicate 5 Gram(s) Oral daily  tamsulosin 0.8 milliGRAM(s) Oral at bedtime    MEDICATIONS  (PRN):  acetaminophen   Oral Liquid .. 650 milliGRAM(s) Oral every 6 hours PRN Temp greater or equal to 38C (100.4F), Mild Pain (1 - 3)  bisacodyl Suppository 10 milliGRAM(s) Rectal daily PRN Constipation  oxyCODONE    Solution 5 milliGRAM(s) Oral every 4 hours PRN Moderate Pain (4 - 6)    Dosing Anthropometrics:   Height for BMI (FEET)	5 Feet  Height for BMI (INCHES)	8 Inch(s)  Height for BMI (CM)	172.72 Centimeter(s)  Weight for BMI (lbs)	176.4 lb  Weight for BMI (kg)	80 kg  Body Mass Index	              26.8  ideal body weight:               154 pounds, pt is ~115% of ideal body weight    Weight Change: no new weights noted in electronic medical records; recommend that nursing obtain updated weights weekly prn. RD to monitor trends.     Estimated energy needs:  Weight used for calculations	ideal body weight  Estimated Energy Needs Weight (lbs)	154 lb  Estimated Energy Needs Weight (kg)	69.8 kg  Estimated Energy Needs From (artemio/kg)	25  Estimated Energy Needs To (artemio/kg)	30  Estimated Energy Needs Calculated From (artemio/kg)	1745  Estimated Energy Needs Calculated To (artemio/kg)	2094  Weight used for calculations	ideal body weight  Estimated Protein Needs Weight (lbs)	154 lb  Estimated Protein Needs Weight (kg)	69.8 kg  Estimated Protein Needs From (g/kg)	1.2  Estimated Protein Needs To (g/kg)	1.4  Estimated Protein Needs Calculated From (g/kg)	83.76  Estimated Protein Needs Calculated To (g/kg)	97.72  Estimated Fluid Needs Weight (lbs)	154 lb  Estimated Fluid Needs Weight (kg)	69.8 kg  Estimated Fluid Needs From (ml/kg)	25  Estimated Fluid Needs To (ml/kg)	30  Estimated Fluid Needs Calculated From (ml/kg)	1745  Estimated Fluid Needs Calculated To (ml/kg)	2094  Other Calculations	Pt is >100% ideal body weight in the ICU, thus ideal body weight used for all calculations. Needs adjusted for age, clinical course, vented, ICU/critical care status.    Subjective: This is a 44 year old male, unknown past medical history (reported stroke and MI by coworkers) presented to [facility] with AMS, Pt was working at "[employer] when he was found down by coworkers. EMS called and pt brought to [facility] ED. Intubated, sedated, started on cardene for SBPs in 200s. CT head showed brain stem bleed. Transferred to NSICU for further management.    Pt assessed and discussed with interdisciplinary team at interdisciplinary rounds. Pt intubated, on VC-AC ventilator mode, SpO2 97-99%, pt no longer on sedation, no pressor support, afebrile, MAPs ranging from 67-89. Pt noted with no emesis; minimal rectal tube output overnight per nursing flowsheets; noted with nondistended abdomen, nontender, soft abdomen noted with normoactive and audible bowel sounds per medical team. Nonverbal indicators of pain noted by nursing. Noted with pitting 2+ generalized edema; no documented pressure ulcers. Labs reviewed: elevated BUN (48), Creatinine (3.40), all electrolytes within normal limits, low eGFR (22), medical team is aware, RD to monitor trends. RD discussed updated recommendations with medical team so as to adequately meet his needs. Medical team receptive. RD to remain available. Please see additional information/recommendations below.    Previous Nutrition Diagnosis: Inadequate Oral Intake related to inability to meet nutritional demands via PO as evidenced by NPO with tube feedings    Active [ x ]  Resolved [   ]    If resolved, new PES:     Goal: Pt to consistently meet at least 75% of EEE via tolerated route that is consistent with goals of care during hospital stay    Nutrition Recommendations:  1. NPO  **consider the following: Jevity 1.5 via feasible route, start at 20mL/hour, increase by 10mL/hour every 4-6 hours to GOAL of 65mL/hour x 18 hours + 2 LPS (liquid protein supplement), to provide 1170mL total volume from tube feeding, 1955 calories, 105g protein, 889mL free water; this meets ~28 calories/kg ideal body weight, ~22 nonprotein calories/kg ideal body weight, 1.5g protein/kg ideal body weight (70kg); consider 180mL every 4 hours  **continue Banatrol 2x/day to promote fecal bulk**  *Maintain aspiration precautions at all times; monitor for signs/symptoms of intolerance*  2. Monitor weights (weekly), GI function, skin integrity, chemistry/labs  **electrolytes, BMP, glucose, CBC per MD discretion *renal indices**  3. Pain and bowel regimen per medical team  4. Align nutrition with goals of care at all times    Education: deferred at this time; RD to follow up prn as appropriate.     Risk Level: High [ x ] Moderate [   ] Low [   ]. Admitting Diagnosis:   Patient is a 44y old  Male who presents with a chief complaint of brain stem bleed (23 Oct 2024 06:25)  PAST MEDICAL & SURGICAL HISTORY:  Acute myocardial infarction  CVA (cerebral vascular accident)    Current Nutrition Order:  Diet, NPO with Tube Feed:   Tube Feeding Modality: Gastrostomy  Jevity 1.5 Artemio (JEVITY1.5)  Total Volume for 24 Hours (mL): 1170  Total Number of Cans: 5  Continuous  Starting Tube Feed Rate {mL per Hour}: 20  Increase Tube Feed Rate by (mL): 10     Every 4 hours  Until Goal Tube Feed Rate (mL per Hour): 65  Tube Feed Duration (in Hours): 18  Tube Feed Start Time: 11:00  Tube Feed Stop Time: 05:00  Banatrol TF     Qty per Day:  2  Liquid Protein Supplement     Qty per Day:  2 (10-23-24 @ 09:59)      PO Intake: Good (%) [   ]  Fair (50-75%) [   ] Poor (<25%) [   ] *NPO with tube feeding [ x ]*    GI Issues: BM on 10/23/24, rectal tube noted, fecal incontinence noted; output of 1mL stool; liquid BMs; soft/nontender, rounded abdomen    Pain: nonverbal indicators of pain absent    Skin Integrity: surgical incisions remain; PEG in place; bilateral hand pitting 2+ edema; no pressure ulcers documented; Mookie: 12      10-22-24 @ 07:01  -  10-23-24 @ 07:00  --------------------------------------------------------  IN: 2440 mL / OUT: 3150 mL / NET: -710 mL    10-23-24 @ 07:01  -  10-23-24 @ 12:28  --------------------------------------------------------  IN: 450 mL / OUT: 900 mL / NET: -450 mL        Labs:   10-23    143  |  110[H]  |  41[H]  ----------------------------<  97  4.9   |  20[L]  |  3.06[H]    Ca    9.1      23 Oct 2024 05:30  Phos  4.9     10-23  Mg     2.4     10-23      CAPILLARY BLOOD GLUCOSE      POCT Blood Glucose.: 97 mg/dL (23 Oct 2024 11:47)  POCT Blood Glucose.: 89 mg/dL (22 Oct 2024 15:38)      Medications:  MEDICATIONS  (STANDING):  acetylcysteine 10%  Inhalation 4 milliLiter(s) Inhalation every 12 hours  albuterol/ipratropium for Nebulization 3 milliLiter(s) Nebulizer every 12 hours  amantadine 100 milliGRAM(s) Oral <User Schedule>  amLODIPine   Tablet 10 milliGRAM(s) Oral daily  ampicillin/sulbactam  IVPB 3 Gram(s) IV Intermittent <User Schedule>  artificial tears (preservative free) Ophthalmic Solution 1 Drop(s) Right EYE every 2 hours  calcium acetate 667 milliGRAM(s) Oral three times a day with meals  carbidopa/levodopa  25/100 1 Tablet(s) Oral <User Schedule>  chlorhexidine 2% Cloths 1 Application(s) Topical <User Schedule>  doxazosin 4 milliGRAM(s) Oral at bedtime  erythromycin   Ointment 1 Application(s) Right EYE every 4 hours  heparin   Injectable 5000 Unit(s) SubCutaneous every 8 hours  ofloxacin 0.3% Solution 1 Drop(s) Right EYE four times a day  pantoprazole  Injectable 40 milliGRAM(s) IV Push daily  petrolatum Ophthalmic Ointment 1 Application(s) Both EYES two times a day  sodium zirconium cyclosilicate 5 Gram(s) Oral daily  tamsulosin 0.8 milliGRAM(s) Oral at bedtime    MEDICATIONS  (PRN):  acetaminophen   Oral Liquid .. 650 milliGRAM(s) Oral every 6 hours PRN Temp greater or equal to 38C (100.4F), Mild Pain (1 - 3)  bisacodyl Suppository 10 milliGRAM(s) Rectal daily PRN Constipation  oxyCODONE    Solution 5 milliGRAM(s) Oral every 4 hours PRN Moderate Pain (4 - 6)    Dosing Anthropometrics:   Height for BMI (FEET)	5 Feet  Height for BMI (INCHES)	8 Inch(s)  Height for BMI (CM)	172.72 Centimeter(s)  Weight for BMI (lbs)	176.4 lb  Weight for BMI (kg)	80 kg  Body Mass Index	              26.8  ideal body weight:               154 pounds, pt is ~115% of ideal body weight    Weight Change: no new weights noted in electronic medical records; recommend that nursing obtain updated weights weekly prn. RD to monitor trends.     Estimated energy needs:  Weight used for calculations	ideal body weight  Estimated Energy Needs Weight (lbs)	154 lb  Estimated Energy Needs Weight (kg)	69.8 kg  Estimated Energy Needs From (artemio/kg)	25  Estimated Energy Needs To (artemio/kg)	30  Estimated Energy Needs Calculated From (artemio/kg)	1745  Estimated Energy Needs Calculated To (artemio/kg)	2094  Weight used for calculations	ideal body weight  Estimated Protein Needs Weight (lbs)	154 lb  Estimated Protein Needs Weight (kg)	69.8 kg  Estimated Protein Needs From (g/kg)	1.2  Estimated Protein Needs To (g/kg)	1.4  Estimated Protein Needs Calculated From (g/kg)	83.76  Estimated Protein Needs Calculated To (g/kg)	97.72  Estimated Fluid Needs Weight (lbs)	154 lb  Estimated Fluid Needs Weight (kg)	69.8 kg  Estimated Fluid Needs From (ml/kg)	25  Estimated Fluid Needs To (ml/kg)	30  Estimated Fluid Needs Calculated From (ml/kg)	1745  Estimated Fluid Needs Calculated To (ml/kg)	2094  Other Calculations	Pt is >100% ideal body weight in the ICU, thus ideal body weight used for all calculations. Needs adjusted for age, clinical course, vented, ICU/critical care status.    Subjective: This is a 44 year old male, unknown past medical history (reported stroke and MI by coworkers) presented to [facility] with AMS, Pt was working at [employer] when he was found down by coworkers. EMS called and pt brought to [facility] ED. Intubated, sedated, started on cardene for SBPs in 200s. CT head showed brain stem bleed. Transferred to NSICU for further management.    Pt assessed and discussed with interdisciplinary team at interdisciplinary rounds. Pt intubated, on ventilator + CPAP mode, SpO2 %, pt no longer on sedation, no pressor support, afebrile, MAPs ranging from 101-110. BM on 10/23/24, rectal tube noted, fecal incontinence noted; output of 1mL stool; liquid BMs; soft/nontender, rounded abdomen. Nonverbal indicators of pain absent. Surgical incisions remain; PEG in place (as of 10/21/24); bilateral hand pitting 2+ edema; no pressure ulcers documented. Labs reviewed: elevated BUN (41), Creatinine (3.06), elevated Phosphorus (4.9), all other electrolytes within normal limits, low eGFR (25), medical team is aware, RD to monitor trends. RD discussed updated recommendations with medical team so as to adequately meet his needs. Medical team receptive. RD to remain available. Please see additional information/recommendations below.    Previous Nutrition Diagnosis: Inadequate Oral Intake related to inability to meet nutritional demands via PO as evidenced by NPO with tube feedings    Active [ x ]  Resolved [   ]    If resolved, new PES:     Goal: Pt to consistently meet at least 75% of EEE via tolerated route that is consistent with goals of care during hospital stay    Nutrition Recommendations:  1. NPO  **consider the following: Jevity 1.5 via PEG, start at 20mL/hour, increase by 10mL/hour every 4-6 hours to GOAL of 65mL/hour x 18 hours + 2 LPS (liquid protein supplement), to provide 1170mL total volume from tube feeding, 1955 calories, 105g protein, 889mL free water; this meets ~28 calories/kg ideal body weight, ~22 nonprotein calories/kg ideal body weight, 1.5g protein/kg ideal body weight (70kg); consider 180mL every 4 hours  **continue Banatrol 2x/day PRN to promote fecal bulk**  *Maintain aspiration precautions at all times; monitor for signs/symptoms of intolerance*  2. Monitor weights (weekly), GI function, skin integrity, chemistry/labs  **electrolytes, BMP, glucose, CBC per MD discretion *renal indices**  3. Pain and bowel regimen per medical team  4. Align nutrition with goals of care at all times    Education: deferred at this time; RD to follow up prn as appropriate.     Risk Level: High [ x ] Moderate [   ] Low [   ].

## 2024-10-23 NOTE — PROGRESS NOTE ADULT - CRITICAL CARE SERVICES
pt. will require a rolling walker to be able to perform their MRADLs within their home./rolling walker 60

## 2024-10-23 NOTE — PROGRESS NOTE ADULT - SUBJECTIVE AND OBJECTIVE BOX
Neurocritical Care Attending Note     GARETT REYES   MRN-[id number]  Summary:  44y/M  unknown past medical history (reported stroke and MI by coworkers) presented to [facility] with AMS, Pt was working at [employer] when he was found down by coworkers. EMS called and pt brought to [facility] ED. Intubated, sedated, started on cardene for SBPs in 200s. CT head showed brain stem bleed. Transferred to NSICU for further management.  (30 Sep 2024 12:55)    Hospital Course/Interval Events  9/30: transferred from [facility]. A line placed. Versed dc'd. Roomate Prasanth at bedside, states pt has family in [place], cannot confirm medications or PMH other than stroke and MI. 250cc bolus 3% given. LR switched to NS. hydralazine 25q8 started, 3% started, switched propofol to precedex   10/1: stability CTH done. Added labetalol, started TF. Palliative consulted. ethics consulted to determine surrogate. febrile 103, pan cx sent  10/2: BD 2, GEORGE overnight. TF resumed. Desatt'd to 80s, FiO2 inc. to 50. Fentanyl given, ABG, CXR ordered. Maxxed on precedex, started on propofol for DARIEN -4 - -5. Precedex dc'd. Duonebs, mucomyst, hypertonic added. 3% dc'd. Cardene dc'd. Start vanc/CTX. Increased labetalol 200q8. MRSA negative, dc'd vanc. ETT pulled back 2cm x 2, good positioning after confirmatory chest xray. Ethics attempting to establish HCP with family. Na 159, starting FW 250q6 for range 150-155.   10/3: BD3, GEORGE o/n, neuro stable. Na elevating, FW increased to 300q6. Dc'd bowel reg for diarrhea. vEEG started. SQH 5000q8 tonight.   10/4: BD 4, albumn bolus, incr. LR to 80 2/2 incr. in Cr, LR to 100cc/hr for uptrending Cr. Started 7% hypersal for 48hrs and SL atropine for inline/oral thick secretions. Dc'd CTX and started ancef for MSSA in the sputum. Nephrology consulted for CKD, f/u recs. SBP 170s, given hydralazine 10mg IVP.   10/5: BD5, o/n 10mg IVP hydralazine given for SBP 170s and started on hydralazine 25q8 via OGT. 10mg IV push labetalol for SBP > 160s. RT placed for diarrhea.   10/6: BD6, o/n FW increased to 350q4 per nephrology recs. IV tylenol for temp 100.6, SBp 160s presumed uncomfortable.   10/7: BD7, overnight pancultured for temp 101.8F.   10/8: BD8. GEORGE. Cr bumped. decreased LR to 75cc/hr. Adding simethicone ATC. incr hydralazine 50mgTID. Incr labetalol 300mgTID. Na 145, decreased FWF to 250q6. Start precedex. FENa consistent with intrinsic kidney injury. Pend repeat renal US. Retaining up to 1.3L, bladder scans q6, straight cath PRN  10/9: BD 9. GEORGE overnight. Neuro stable. abd xray for distention w non-specific gas pattern, OGT to LIWS for morning. duonebs/mucomyst to q8 for improving secretions. Changed tube feeds to Jevity 1.5 20cc/hr, low rate due to abdominal distention, nepro dense and more difficult to digest. Tolerating CPAP, confirmed by ABG.   10/10: BD 10. GEORGE overnight. Neuro stable. (+) gabriel for urinary retention on bladder scan. inc TF to goal rate of 40cc/hr. family leaning toward pursuing trach/PEG. 1/2 amp for FS 81.   10/11: BD 11. GEORGE overnight. Neuro stable. Trach/PEG consults placed.   10/12: BD 12. GEORGE overnight. Neuro stable. MRI brain complete.   10/13: BD 13. Increase flomax. Hold SQH after PM dose for trach tm. IVL.   10/14: BD 14. GEORGE overnight, remains on AC/VC. Gabriel placed for urinary retention. Dc'd free water.  S/p trach with pulm. NGT placed and CXR confirmed in good position.   10/15: BD 15, GEORGE ovn. resumed feeds. spiked 101, pan cx sent.   10/16: BD 16. GEORGE ovn. Lokelma 5mg for K+ 5.4. Started vanc q 24/zosyn for empiric PNA coverage, IVF to 100/hr. PEG held for fever.   10/17: Na low, ordered serum osm and urine osm for am. Started sinemet for neurostimulation. Increased cardura to 0.8. Started FW 100q4, dc'd IVF.   10/18: BD 18, GEORGE overnight, neuro stable. Amantadine added for neurostim.   10/19: BD 19, GEORGE ovn. cardura 2mg added for retention. labetalol decreased 200q8, hydralazine decreased 25q8. Gabriel replaced.   10/20: BD20, GEORGE overnight. NGT dislodged, replaced.   10/21: BD 21. POD0 PEG placement with Gen surg. decr labetolol to 50q8, incr. cardura to 0.4, started lokelma and phoslo, dc gabriel POD0 PEG placement with Gen surg.  10/22: BD 22. Plan to start TF today via PEG. dc labetalol, Following ophtho recs. Increased apnea settings - found to be in cheyne-moe respiration. CPAP 5/5.  10/23: BD 23. hydralazine d/c'd, trach collar trial today    MEDICATIONS  (STANDING):  acetylcysteine 10%  Inhalation 4 milliLiter(s) Inhalation every 12 hours  albuterol/ipratropium for Nebulization 3 milliLiter(s) Nebulizer every 12 hours  amantadine 100 milliGRAM(s) Oral <User Schedule>  amLODIPine   Tablet 10 milliGRAM(s) Oral daily  ampicillin/sulbactam  IVPB 3 Gram(s) IV Intermittent <User Schedule>  artificial tears (preservative free) Ophthalmic Solution 1 Drop(s) Right EYE every 2 hours  calcium acetate 667 milliGRAM(s) Oral three times a day with meals  carbidopa/levodopa  25/100 1 Tablet(s) Oral <User Schedule>  chlorhexidine 2% Cloths 1 Application(s) Topical <User Schedule>  doxazosin 4 milliGRAM(s) Oral at bedtime  erythromycin   Ointment 1 Application(s) Right EYE every 4 hours  heparin   Injectable 5000 Unit(s) SubCutaneous every 8 hours  ofloxacin 0.3% Solution 1 Drop(s) Right EYE four times a day  petrolatum Ophthalmic Ointment 1 Application(s) Both EYES two times a day  sodium zirconium cyclosilicate 5 Gram(s) Oral daily  tamsulosin 0.8 milliGRAM(s) Oral at bedtime    MEDICATIONS  (PRN):  acetaminophen   Oral Liquid .. 650 milliGRAM(s) Oral every 6 hours PRN Temp greater or equal to 38C (100.4F), Mild Pain (1 - 3)  bisacodyl Suppository 10 milliGRAM(s) Rectal daily PRN Constipation  oxyCODONE    Solution 5 milliGRAM(s) Oral every 4 hours PRN Moderate Pain (4 - 6)          Physical Exam  Vital Signs Last 24 Hrs  T(C): 36.7 (23 Oct 2024 17:58), Max: 37.4 (22 Oct 2024 21:00)  T(F): 98.1 (23 Oct 2024 17:58), Max: 99.3 (22 Oct 2024 21:00)  HR: 75 (23 Oct 2024 18:00) (73 - 88)  BP: 146/99 (23 Oct 2024 18:00) (124/85 - 146/99)  BP(mean): 117 (23 Oct 2024 18:00) (100 - 117)  RR: 10 (23 Oct 2024 18:00) (8 - 16)  SpO2: 100% (23 Oct 2024 18:00) (97% - 100%)    Mode: standby  Gen: no apparent distress  HEENT right eye conjunctival injection and corneal abrasion, neck supple, tracheostomy in place   RESP: No respiratory distress, CTA b/l, no WRR  CV: RRR, +S1S2  GI: Soft, NT, ND  Extr: non edematous, peripheral pulses present symmetric   NEURO:   Mental Status: awake/eyes open, maintaining eyes opening spontaneously intermittent downward gaze/ocular bobbing, can track vertically, appears to have impaired horizontal bilateral eyes movement - intermittently following commands (wiggling toes on right), and mimicking thumb up.  CNs: Disconjugated gaze, left eye some abduction and adduction movements noted, spontaneous vs. purposeful downward intermittent eye movement, pupils equally round and reactive to light right larger than left, corneal + L, trace on right,  cough ++, breathing over vent  Motor: head/neck movement towards noxious, extensor posturing to noxious at the bilateral upper extremities, triple flexion at the bilateral lower extremities     ASSESSMENT/PLAN  45 y/o PMH ?stroke/MI present to [facility] after collapsing at work. Decorticate posturing, vomiting, intubated for airway protection. 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 Transferred to [facility] for further management. s/p trach 10/14. Course complicated by persistent encephalopathy/wakeful unresponsiveness, respiratory failure, VAP.     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   Data   - CTA head and neck 09/30: Re-demonstration of pontine hemorrhage with associated edema and mass effect upon the fourth ventricle and cerebral aqueduct. No underlying no malformation is identified. No increase in ventricular size since the   prior study.  - CT head 10/03: Again noted is a parenchymal hematoma in the nabil, with associated  expansion and surrounding vasogenic edema. There is mild spillage into the subarachnoid spaces at the cistern and within sulci in the right frontal lobe. All of these findings appear stable, without interval rebleeding. There is a chronic hemorrhagic infarct in the left external capsule/corona radiata. There is a small chronic white matter infarct vs small vessel disease in the right corona radiata. Moderate generalized cerebral volume loss, with distention of the sulci and concomitant ex-vacuo ventricular dilatation. Mild nonspecific low attenuation in the periventricular and subcortical white matter. No midline shift or herniation. No CT evidence of acute territorial infarction, although MRI with DWI would be more sensitive. Limited views of the sinuses and mastoids show mild mucosal thickening without air-fluid levels, likely chronic. An ETT is noted in place. Limited views of the orbits and visualized soft tissues of the neck, face, scalp, skull base, and calvarium are otherwise unremarkable. IMPRESSION: No significant change, without interval rebleeding.  - MRI brain 10/12: Similar-appearing evolving acute parenchymal hemorrhage within the nabil with surrounding mass effect and edema. No underlying enhancement. No new areas of acute intracranial hemorrhage. Wedge-shaped acute/subacute infarct within the right lateral cerebellar hemisphere. Unchanged encephalomalacia and gliosis within the left external capsule related to remote hemorrhage in this location.  - vEEG 10/17: with predominant delta slowing with superimposed geo and beta/some reactivity present     Plan  Neuro check q4 and vitals q1  BP goal 100-160, MAP > 65  Sinemet and Amantadine to promote awakening   STAT CT head for any acute neurological change   Off sedation  Oxycodone/Tylenol for pain PRN  Plan for long term vent rehab facility   Opthalmology consult given keratitis       CV   Diagnosis: Uncontrolled hypertension at presentation; now episodes of hypotension  Data  HR: 75 (23 Oct 2024 18:00) (73 - 88)  BP: 146/99 (23 Oct 2024 18:00) (124/85 - 146/99)  BP(mean): 117 (23 Oct 2024 18:00) (100 - 117)  ECHO 9/30 hyperdynamic left ventricular function EF 70%  Plan  SBP goal 100-150, MAP > 65  Continue amlodipine 10 mg daily   Telemetry monitoring    PULM   Diagnosis: Intubated for airway protection in the setting of acute encephalopathy; persistent respiratory failure; S/P tracheostomy; VAP,  trach collar since 10/23 AM  Data  RR: 10 (23 Oct 2024 18:00) (8 - 16)  SpO2: 100% (23 Oct 2024 18:00) (97% - 100%)  O2 Parameters below as of 23 Oct 2024 18:00  Patient On (Oxygen Delivery Method): tracheostomy collar, 15  O2 Flow (L/min): 15  O2 Concentration (%): 40  Mode: standby     Data  Chest x-ray 10/17: Similar to prior exam 10/16/2024 with right lung base partial atelectasis. Nasogastric tube tip below hemidiaphragm. No pneumothorax. No acute infiltrate appearing. Possible minimal right pleural effusion. No left pleural effusion.  Chest x-ray 10/21:  Two views of chest obtained at bedside. NG tube in stomach. Small discoid atelectasis right perihilar region. No pleural effusion.  Cardia mediastinal silhouette unremarkable. Degenerative changes of spine.  IMPRESSION: Small atelectasis right perihilar region is unchanged.    Plan   Continue trach collar   Pulm toilet/chest PT q4/Nebsq12  pulse-oxymetry monitoring   Antibiotics for VAP    RENAL   Diagnosis: ANIKA on CKD (unknown baseline) - - > stable; urinary retention requiring frequent straight cath required Gabriel replacement; intermittent hyperkalemia   Data  10-23    143  |  110[H]  |  41[H]  ----------------------------<  97  4.9   |  20[L]  |  3.06[H]    Ca    9.1      23 Oct 2024 05:30  Phos  4.9     10-23  Mg     2.4     10-23    I&O's Summary    22 Oct 2024 07:01  -  23 Oct 2024 07:00  --------------------------------------------------------  IN: 2440 mL / OUT: 3150 mL / NET: -710 mL    23 Oct 2024 07:01  -  23 Oct 2024 18:42  --------------------------------------------------------  IN: 690 mL / OUT: 1500 mL / NET: -810 mL    Plan   Euvolemia/normonatremia goal  KVO   q6   Monitor for hyperkalemia/monitor I/o  Repeat BMP and EKG ---> if K still elevated/EKG changes treat as per protocol  bladder scanner q4 hours + straight cath PRN given urinary retention  Tamsulosin   Doxazosin   Renal following   Avoid nephrotoxic medications    ID  Diagnosis: Febrile, normal white count/ Fever and hypoxia on 10/15 c/w Ventilator Associated PNA ++Acitonobacter B. ++MSSA   Data    T(C): 36.7 (23 Oct 2024 17:58), Max: 37.4 (22 Oct 2024 21:00)    WBC 6.40 < 6.25      Blood Cx 10/15: NGTD   Sputum Cx 10/16: Acinetobacter B.   Procalcitonin 10/16: 0.36 <-- 0.13   Plan   - S/p  7-day antibiotics Vanc and Zosyn ---> narrowed to Unasyn (10/16 - 10/23) for Acitenobacter B PNA  - S/P Ancef  - monitor WBC and fever curve   - culture if spikes 38.3C    GI  Diagnosis: Dysphagia secondary to encephalopathy/hemorrhagic stroke s/p PEG placement (10/21 by Surgery)    Data  Last Bowel Movement: 23-Oct-2024 (10-23-24 @ 08:00)  Plan   S/p PEG 10/21  Started enteral feeding via PEG - at goal   Free water flashes 100 q6  (KVO)  GI prophylaxis while intubated   Bowel regimen + suppository     HEME/ONC  Diagnosis: thrombocytosis now resolved   Data                        9.3    6.40  )-----------( 377      ( 23 Oct 2024 05:30 )             30.5       LE venous ultrasound 10/19: negative for DVT     Plan   Monitor H&H  Hbg goal > 7.0, PLT > 100  SDCs + heparin 5000 q8 for DVT prophylaxis    ENDO  Diagnosis: relative hypoglycemia   Data  A1c: 5.3  CAPILLARY BLOOD GLUCOSE  POCT Blood Glucose.: 123 mg/dL (23 Oct 2024 17:11)  POCT Blood Glucose.: 97 mg/dL (23 Oct 2024 11:47)    Plan:   Goal euglycemia (-180)  Avoid hypoglycemic episodes      CODE STATUS: FULL CODE  Patient's son (Chauncey Reyes) updated at bedside with . He understands the current clinical condition of his father and would like for him to continue receiving full life sustaining measures.     DISPO:   NeuroICU  Tentative plan to transfer to Vent step down unit when able tolerate trach collar for 24-48 hours        Jazlyn Tomlin MD   Neurocritical Care Attending                Neurocritical Care Attending Note     GARETT REYES   MRN-[id number]  Summary:  44y/M  unknown past medical history (reported stroke and MI by coworkers) presented to [facility] with AMS, Pt was working at [employer] when he was found down by coworkers. EMS called and pt brought to [facility] ED. Intubated, sedated, started on cardene for SBPs in 200s. CT head showed brain stem bleed. Transferred to NSICU for further management.  (30 Sep 2024 12:55)    Hospital Course/Interval Events  9/30: transferred from [facility]. A line placed. Versed dc'd. Roomate Prasanth at bedside, states pt has family in [place], cannot confirm medications or PMH other than stroke and MI. 250cc bolus 3% given. LR switched to NS. hydralazine 25q8 started, 3% started, switched propofol to precedex   10/1: stability CTH done. Added labetalol, started TF. Palliative consulted. ethics consulted to determine surrogate. febrile 103, pan cx sent  10/2: BD 2, GEORGE overnight. TF resumed. Desatt'd to 80s, FiO2 inc. to 50. Fentanyl given, ABG, CXR ordered. Maxxed on precedex, started on propofol for DARIEN -4 - -5. Precedex dc'd. Duonebs, mucomyst, hypertonic added. 3% dc'd. Cardene dc'd. Start vanc/CTX. Increased labetalol 200q8. MRSA negative, dc'd vanc. ETT pulled back 2cm x 2, good positioning after confirmatory chest xray. Ethics attempting to establish HCP with family. Na 159, starting FW 250q6 for range 150-155.   10/3: BD3, GEORGE o/n, neuro stable. Na elevating, FW increased to 300q6. Dc'd bowel reg for diarrhea. vEEG started. SQH 5000q8 tonight.   10/4: BD 4, albumn bolus, incr. LR to 80 2/2 incr. in Cr, LR to 100cc/hr for uptrending Cr. Started 7% hypersal for 48hrs and SL atropine for inline/oral thick secretions. Dc'd CTX and started ancef for MSSA in the sputum. Nephrology consulted for CKD, f/u recs. SBP 170s, given hydralazine 10mg IVP.   10/5: BD5, o/n 10mg IVP hydralazine given for SBP 170s and started on hydralazine 25q8 via OGT. 10mg IV push labetalol for SBP > 160s. RT placed for diarrhea.   10/6: BD6, o/n FW increased to 350q4 per nephrology recs. IV tylenol for temp 100.6, SBp 160s presumed uncomfortable.   10/7: BD7, overnight pancultured for temp 101.8F.   10/8: BD8. GEORGE. Cr bumped. decreased LR to 75cc/hr. Adding simethicone ATC. incr hydralazine 50mgTID. Incr labetalol 300mgTID. Na 145, decreased FWF to 250q6. Start precedex. FENa consistent with intrinsic kidney injury. Pend repeat renal US. Retaining up to 1.3L, bladder scans q6, straight cath PRN  10/9: BD 9. GEORGE overnight. Neuro stable. abd xray for distention w non-specific gas pattern, OGT to LIWS for morning. duonebs/mucomyst to q8 for improving secretions. Changed tube feeds to Jevity 1.5 20cc/hr, low rate due to abdominal distention, nepro dense and more difficult to digest. Tolerating CPAP, confirmed by ABG.   10/10: BD 10. GEORGE overnight. Neuro stable. (+) gabriel for urinary retention on bladder scan. inc TF to goal rate of 40cc/hr. family leaning toward pursuing trach/PEG. 1/2 amp for FS 81.   10/11: BD 11. GEORGE overnight. Neuro stable. Trach/PEG consults placed.   10/12: BD 12. GEORGE overnight. Neuro stable. MRI brain complete.   10/13: BD 13. Increase flomax. Hold SQH after PM dose for trach tm. IVL.   10/14: BD 14. GEORGE overnight, remains on AC/VC. Gabriel placed for urinary retention. Dc'd free water.  S/p trach with pulm. NGT placed and CXR confirmed in good position.   10/15: BD 15, GEORGE ovn. resumed feeds. spiked 101, pan cx sent.   10/16: BD 16. GEORGE ovn. Lokelma 5mg for K+ 5.4. Started vanc q 24/zosyn for empiric PNA coverage, IVF to 100/hr. PEG held for fever.   10/17: Na low, ordered serum osm and urine osm for am. Started sinemet for neurostimulation. Increased cardura to 0.8. Started FW 100q4, dc'd IVF.   10/18: BD 18, GEORGE overnight, neuro stable. Amantadine added for neurostim.   10/19: BD 19, GEORGE ovn. cardura 2mg added for retention. labetalol decreased 200q8, hydralazine decreased 25q8. Gabriel replaced.   10/20: BD20, GEORGE overnight. NGT dislodged, replaced.   10/21: BD 21. POD0 PEG placement with Gen surg. decr labetolol to 50q8, incr. cardura to 0.4, started lokelma and phoslo, dc gabriel POD0 PEG placement with Gen surg.  10/22: BD 22. Plan to start TF today via PEG. dc labetalol, Following ophtho recs. Increased apnea settings - found to be in cheyne-moe respiration. CPAP 5/5.  10/23: BD 23. hydralazine d/c'd, trach collar trial today    MEDICATIONS  (STANDING):  acetylcysteine 10%  Inhalation 4 milliLiter(s) Inhalation every 12 hours  albuterol/ipratropium for Nebulization 3 milliLiter(s) Nebulizer every 12 hours  amantadine 100 milliGRAM(s) Oral <User Schedule>  amLODIPine   Tablet 10 milliGRAM(s) Oral daily  ampicillin/sulbactam  IVPB 3 Gram(s) IV Intermittent <User Schedule>  artificial tears (preservative free) Ophthalmic Solution 1 Drop(s) Right EYE every 2 hours  calcium acetate 667 milliGRAM(s) Oral three times a day with meals  carbidopa/levodopa  25/100 1 Tablet(s) Oral <User Schedule>  chlorhexidine 2% Cloths 1 Application(s) Topical <User Schedule>  doxazosin 4 milliGRAM(s) Oral at bedtime  erythromycin   Ointment 1 Application(s) Right EYE every 4 hours  heparin   Injectable 5000 Unit(s) SubCutaneous every 8 hours  ofloxacin 0.3% Solution 1 Drop(s) Right EYE four times a day  petrolatum Ophthalmic Ointment 1 Application(s) Both EYES two times a day  sodium zirconium cyclosilicate 5 Gram(s) Oral daily  tamsulosin 0.8 milliGRAM(s) Oral at bedtime    MEDICATIONS  (PRN):  acetaminophen   Oral Liquid .. 650 milliGRAM(s) Oral every 6 hours PRN Temp greater or equal to 38C (100.4F), Mild Pain (1 - 3)  bisacodyl Suppository 10 milliGRAM(s) Rectal daily PRN Constipation  oxyCODONE    Solution 5 milliGRAM(s) Oral every 4 hours PRN Moderate Pain (4 - 6)          Physical Exam  Vital Signs Last 24 Hrs  T(C): 36.7 (23 Oct 2024 17:58), Max: 37.4 (22 Oct 2024 21:00)  T(F): 98.1 (23 Oct 2024 17:58), Max: 99.3 (22 Oct 2024 21:00)  HR: 75 (23 Oct 2024 18:00) (73 - 88)  BP: 146/99 (23 Oct 2024 18:00) (124/85 - 146/99)  BP(mean): 117 (23 Oct 2024 18:00) (100 - 117)  RR: 10 (23 Oct 2024 18:00) (8 - 16)  SpO2: 100% (23 Oct 2024 18:00) (97% - 100%)    Mode: standby  Gen: no apparent distress  HEENT right eye conjunctival injection and corneal abrasion, neck supple, tracheostomy in place   RESP: No respiratory distress, CTA b/l, no WRR  CV: RRR, +S1S2  GI: Soft, NT, ND rectal tube in place  Extr: non edematous, peripheral pulses present symmetric   NEURO:   Mental Status: sleeping, not opening to verbal stimulus, trace opening to noxious, intermittent downward gaze/ocular bobbing, can track vertically, appears to have impaired horizontal bilateral eyes movement - not following commands   CNs: Disconjugated gaze, left eye some abduction and adduction movements noted, spontaneous vs. purposeful downward intermittent eye movement, pupils equally round and reactive to light right larger than left, corneal + L, trace on right,  cough ++, breathing over vent  Motor: head/neck movement towards noxious, extensor posturing to noxious at the bilateral upper extremities, triple flexion at the bilateral lower extremities     ASSESSMENT/PLAN  43 y/o PMH ?stroke/MI present to [facility] after collapsing at work. Decorticate posturing, vomiting, intubated for airway protection. 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 Transferred to [facility] for further management. s/p trach 10/14. Course complicated by persistent encephalopathy/wakeful unresponsiveness, respiratory failure, VAP.     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   Data   - CTA head and neck 09/30: Re-demonstration of pontine hemorrhage with associated edema and mass effect upon the fourth ventricle and cerebral aqueduct. No underlying no malformation is identified. No increase in ventricular size since the   prior study.  - CT head 10/03: Again noted is a parenchymal hematoma in the nabil, with associated  expansion and surrounding vasogenic edema. There is mild spillage into the subarachnoid spaces at the cistern and within sulci in the right frontal lobe. All of these findings appear stable, without interval rebleeding. There is a chronic hemorrhagic infarct in the left external capsule/corona radiata. There is a small chronic white matter infarct vs small vessel disease in the right corona radiata. Moderate generalized cerebral volume loss, with distention of the sulci and concomitant ex-vacuo ventricular dilatation. Mild nonspecific low attenuation in the periventricular and subcortical white matter. No midline shift or herniation. No CT evidence of acute territorial infarction, although MRI with DWI would be more sensitive. Limited views of the sinuses and mastoids show mild mucosal thickening without air-fluid levels, likely chronic. An ETT is noted in place. Limited views of the orbits and visualized soft tissues of the neck, face, scalp, skull base, and calvarium are otherwise unremarkable. IMPRESSION: No significant change, without interval rebleeding.  - MRI brain 10/12: Similar-appearing evolving acute parenchymal hemorrhage within the nabil with surrounding mass effect and edema. No underlying enhancement. No new areas of acute intracranial hemorrhage. Wedge-shaped acute/subacute infarct within the right lateral cerebellar hemisphere. Unchanged encephalomalacia and gliosis within the left external capsule related to remote hemorrhage in this location.  - vEEG 10/17: with predominant delta slowing with superimposed geo and beta/some reactivity present     Plan  Neuro check q4 and vitals q1  BP goal 100-160, MAP > 65  Sinemet and Amantadine to promote awakening   STAT CT head for any acute neurological change   Off sedation  Oxycodone/Tylenol for pain PRN  Plan for long term vent rehab facility   Opthalmology following       CV   Diagnosis: Uncontrolled hypertension at presentation; now resolved   Data  HR: 75 (23 Oct 2024 18:00) (73 - 88)  BP: 146/99 (23 Oct 2024 18:00) (124/85 - 146/99)  BP(mean): 117 (23 Oct 2024 18:00) (100 - 117)  ECHO 9/30 hyperdynamic left ventricular function EF 70%  Plan  SBP goal 100-150, MAP > 65  Continue amlodipine 10 mg daily   (off hydralazine and labetalol)  Telemetry monitoring    PULM   Diagnosis: Intubated for airway protection in the setting of acute encephalopathy; persistent respiratory failure; S/P tracheostomy; VAP,  trach collar since 10/23 AM  Data  RR: 10 (23 Oct 2024 18:00) (8 - 16)  SpO2: 100% (23 Oct 2024 18:00) (97% - 100%)  O2 Parameters below as of 23 Oct 2024 18:00  Patient On (Oxygen Delivery Method): tracheostomy collar, 15  O2 Flow (L/min): 15  O2 Concentration (%): 40  Mode: standby     Data  Chest x-ray 10/17: Similar to prior exam 10/16/2024 with right lung base partial atelectasis. Nasogastric tube tip below hemidiaphragm. No pneumothorax. No acute infiltrate appearing. Possible minimal right pleural effusion. No left pleural effusion.  Chest x-ray 10/21:  Two views of chest obtained at bedside. NG tube in stomach. Small discoid atelectasis right perihilar region. No pleural effusion.  Cardia mediastinal silhouette unremarkable. Degenerative changes of spine.  IMPRESSION: Small atelectasis right perihilar region is unchanged.    Plan   Continue trach collar   ABG in the morning   Pulm toilet/chest PT q4/Nebsq12  pulse-oxymetry monitoring   Antibiotics for VAP    RENAL   Diagnosis: ANIKA on CKD (unknown baseline) - - > stable; urinary retention requiring frequent straight cath required Gabriel replacement; intermittent hyperkalemia   Data  10-23    143  |  110[H]  |  41[H]  ----------------------------<  97  4.9   |  20[L]  |  3.06[H]    Ca    9.1      23 Oct 2024 05:30  Phos  4.9     10-23  Mg     2.4     10-23    I&O's Summary    22 Oct 2024 07:01  -  23 Oct 2024 07:00  --------------------------------------------------------  IN: 2440 mL / OUT: 3150 mL / NET: -710 mL    23 Oct 2024 07:01  -  23 Oct 2024 18:42  --------------------------------------------------------  IN: 690 mL / OUT: 1500 mL / NET: -810 mL    Plan   Euvolemia/normonatremia goal  KVO   q6   Monitor for hyperkalemia/monitor I/o  bladder scanner q4 hours + straight cath PRN given urinary retention  Tamsulosin + Doxazosin   Renal following   Avoid nephrotoxic medications    ID  Diagnosis: Febrile, normal white count/ Fever and hypoxia on 10/15 c/w Ventilator Associated PNA ++Acitonobacter B. ++MSSA   Data    T(C): 36.7 (23 Oct 2024 17:58), Max: 37.4 (22 Oct 2024 21:00)    WBC 6.40 < 6.25      Blood Cx 10/15: NGTD   Sputum Cx 10/16: Acinetobacter B.   Procalcitonin 10/16: 0.36 <-- 0.13   Plan   - S/p  7-day antibiotics Vanc and Zosyn ---> narrowed to Unasyn (10/16 - 10/23) for Acitenobacter B PNA  - S/P Ancef  - monitor WBC and fever curve   - culture if spikes 38.3C    GI  Diagnosis: Dysphagia secondary to encephalopathy/hemorrhagic stroke s/p PEG placement (10/21 by Surgery)    Data  Last Bowel Movement: 23-Oct-2024 (10-23-24 @ 08:00) diarrhea requiring rectal tube  Plan   S/p PEG 10/21  Started enteral feeding via PEG - at goal   Free water flashes 100 q6  (KVO)  GI prophylaxis while intubated   holding bowel regimen given diarrhea  d/c LOKELMA      HEME/ONC  Diagnosis: no active issues   Data                        9.3    6.40  )-----------( 377      ( 23 Oct 2024 05:30 )             30.5       LE venous ultrasound 10/19: negative for DVT     Plan   Monitor H&H  Hbg goal > 7.0, PLT > 100  SDCs + heparin 5000 q8 for DVT prophylaxis    ENDO  Diagnosis: no active issues   Data  A1c: 5.3  CAPILLARY BLOOD GLUCOSE  POCT Blood Glucose.: 123 mg/dL (23 Oct 2024 17:11)  POCT Blood Glucose.: 97 mg/dL (23 Oct 2024 11:47)    Plan:   Goal euglycemia (-180)  Avoid hypoglycemic episodes      CODE STATUS: FULL CODE  Patient's son (Chauncey Reyes) updated at bedside with . He understands the current clinical condition of his father and would like for him to continue receiving full life sustaining measures.     DISPO:   NeuroICU  Tentative plan to transfer to Vent step down unit when able tolerate trach collar for 24-48 hours        Jazlyn Tomlin MD   Neurocritical Care Attending

## 2024-10-23 NOTE — PROGRESS NOTE ADULT - ASSESSMENT
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 Patient does not have a prior hx of abdominal surgeries, is not currently on any anticoagulation and is only on SQH. Patients son had a long discussion with palliative care regarding goals of care, is understanding the chance of full reocvery is poor, patient's son (SDM) would like to proceed with any measures to prolong his life which includes PEG and trach. Patient is now s/p trach by pulm 10/14 and s/p PEG 10/21.    Recommendations:  Patient is tolerating feds via PEG tube at goal  Rest of care per primary team  Team 4C will sign off at this time    Plan discussed with chief resident and attending

## 2024-10-23 NOTE — PROGRESS NOTE ADULT - ASSESSMENT
44y/M with  large pontine hemorrhage, SAH, brain edema; ?locked-in  CVA  acute MI  acute on chronic CKD, hydronephrosis    PLAN:   NEURO: neurochecks q4h, VSq1 PRN pain meds with acetaminophen  neurostim: sinemet / amantadine  palliative / ethics follow-up  off VEEG - neg  REHAB:  physical therapy evaluation and management    EARLY MOB:  bedrest HOB up    PULM:  s/p trach, cont full vent support and CPAP 10/5 40%, weaning as tolerated, pulmo toilette  CARDIO:  SBP goal 100-160mm Hg, hydralazine, amlodipine; d/c labetalol  ENDO:  Blood sugar goals 140-180 mg/dL, A1C 5.4  GI:  PPI for GI prophylaxis; s/p PEG plans, bowel regimen  DIET: NPO, start TF today  RENAL:  Na goal 135-145, Vuong, tamsulosin / doxazosin 4; IVL once TF started, cont keep FW to 100 q6h, continue phosphate binder and Lokelma  HEM/ONC:  Hb stable  VTE Prophylaxis: SCDs, SQH  ID: afebrile, no leukocytosis; on Unasyn for A. baumanii, MSSA+ (last day 10/23)  Social: will update family  MISC: palliative discussions with family     Active issues:  What's keeping patient in the ICU? vented  What is this patient's dispo plan? wean to trach collar and stepdown vs if unable to - will stepdown to vent bed    ATTENDING ATTESTATION:  I was physically present for the key portions of the evaluation and management (E/M) service provided.  I agree with the above history, physical and plan, which I have reviewed and edited where appropriate.    Patient at high risk for neurological deterioration or death due to:  ICU delirium, aspiration PNA, DVT / PE.  Critical care time:  I have personally provided 60 minutes of critical care time, excluding time spent on separate procedures.      Plan discussed with RN, house staff. 44y/M with  large pontine hemorrhage, SAH, brain edema; ?locked-in  CVA  acute MI  acute on chronic CKD, hydronephrosis    PLAN:   NEURO: neurochecks q4h, VSq1 PRN pain meds with oxycodone   neurostim: sinemet / amantadine  palliative / ethics follow-up  off VEEG - neg  REHAB:  physical therapy evaluation and management    EARLY MOB:  bedrest HOB up    PULM:  trach collar; pulmo toilette, nebs  CARDIO:  SBP goal 100-160mm Hg,  amlodipine; d/c hydralazine  ENDO:  Blood sugar goals 140-180 mg/dL, A1C 5.4, ISS  GI:  PPI for GI prophylaxis; s/p PEG, d/c bowel regimen  DIET: cont TF today  RENAL:  Na goal 135-145, Vuong, tamsulosin / doxazosin 4; cont  q6h, continue phosphate binder and sodium zirconium  HEM/ONC:  Hb stable  VTE Prophylaxis: SCDs, SQH  ID: afebrile, no leukocytosis; on Unasyn for A. baumanii, MSSA+ (last day 10/23)  Social: will update family  MISC: palliative discussions with family; ofloxacin and erythromycin to R eye    Active issues:  What's keeping patient in the ICU? vented  What is this patient's dispo plan? wean to trach collar and stepdown vs if unable to - will stepdown to vent bed    ATTENDING ATTESTATION:  I was physically present for the key portions of the evaluation and management (E/M) service provided.  I agree with the above history, physical and plan, which I have reviewed and edited where appropriate.    Patient at high risk for neurological deterioration or death due to:  ICU delirium, aspiration PNA, DVT / PE.  Critical care time:  I have personally provided 60 minutes of critical care time, excluding time spent on separate procedures.      Plan discussed with RN, house staff. 44y/M with  large pontine hemorrhage, SAH, brain edema; ?locked-in  CVA  h/o MI  acute on chronic CKD, hydronephrosis    PLAN:   NEURO: neurochecks q4h, VSq1 PRN pain meds with oxycodone   neurostim: sinemet / amantadine  palliative / ethics follow-up  off VEEG - neg  REHAB:  physical therapy evaluation and management    EARLY MOB:  bedrest HOB up    PULM:  trach collar; pulmo toilette, nebs  CARDIO:  SBP goal 100-160mm Hg,  amlodipine; d/c hydralazine  ENDO:  Blood sugar goals 140-180 mg/dL, A1C 5.4, ISS  GI:  PPI for GI prophylaxis; s/p PEG, d/c bowel regimen  DIET: cont TF today  RENAL:  Na goal 135-145, Vuong, tamsulosin / doxazosin 4; cont  q6h, continue phosphate binder and sodium zirconium  HEM/ONC:  Hb stable  VTE Prophylaxis: SCDs, SQH  ID: afebrile, no leukocytosis; on Unasyn for A. baumanii, MSSA+ (last day 10/23)  Social: will update family  MISC: palliative discussions with family; ofloxacin and erythromycin to R eye    Active issues:  What's keeping patient in the ICU? vented  What is this patient's dispo plan? wean to trach collar and stepdown vs if unable to - will stepdown to vent bed    ATTENDING ATTESTATION:  I was physically present for the key portions of the evaluation and management (E/M) service provided.  I agree with the above history, physical and plan, which I have reviewed and edited where appropriate.    Patient at high risk for neurological deterioration or death due to:  ICU delirium, aspiration PNA, DVT / PE.  Critical care time:  I have personally provided 60 minutes of critical care time, excluding time spent on separate procedures.      Plan discussed with RN, house staff.

## 2024-10-23 NOTE — PROGRESS NOTE ADULT - SUBJECTIVE AND OBJECTIVE BOX
HPI:  Unknown age male, unknown past medical history (reported stroke and MI by coworkers) presented to [facility] with AMS, Pt was working at [employer] when he was found down by coworkers. EMS called and pt brought to [facility] ED. Intubated, sedated, started on cardene for SBPs in 200s. CT head showed brain stem bleed. Transferred to NSICU for further management.  (30 Sep 2024 12:55)    INTERVAL EVENTS:  S/p PEG - tolerating TF at goal  tracking vertically    HOSPITAL COURSE:  9/30: transferred from [facility]. A line placed. Versed dc'd. Roomcasie Rader at bedside, states pt has family in [place], cannot confirm medications or PMH other than stroke and MI. 250cc bolus 3% given. LR switched to NS. hydralazine 25q8 started, 3% started, switched propofol to precedex   10/1: stability CTH done. Added labetalol, started TF. Palliative consulted. ethics consulted to determine surrogate. febrile 103, pan cx sent  10/2: BD 2, EGORGE overnight. TF resumed. Desatt'd to 80s, FiO2 inc. to 50. Fentanyl given, ABG, CXR ordered. Maxxed on precedex, started on propofol for DARIEN -4 - -5. Precedex dc'd. Duonebs, mucomyst, hypertonic added. 3% dc'd. Cardene dc'd. Start vanc/CTX. Increased labetalol 200q8. MRSA negative, dc'd vanc. ETT pulled back 2cm x 2, good positioning after confirmatory chest xray. Ethics attempting to establish HCP with family. Na 159, starting FW 250q6 for range 150-155.   10/3: BD3, GEORGE o/n, neuro stable. Na elevating, FW increased to 300q6. Dc'd bowel reg for diarrhea. vEEG started. SQH 5000q8 tonight.   10/4: BD 4, albumn bolus, incr. LR to 80 2/2 incr. in Cr, LR to 100cc/hr for uptrending Cr. Started 7% hypersal for 48hrs and SL atropine for inline/oral thick secretions. Dc'd CTX and started ancef for MSSA in the sputum. Nephrology consulted for CKD, f/u recs. SBP 170s, given hydralazine 10mg IVP.   10/5: BD5, o/n 10mg IVP hydralazine given for SBP 170s and started on hydralazine 25q8 via OGT. 10mg IV push labetalol for SBP > 160s. RT placed for diarrhea.   10/6: BD6, o/n FW increased to 350q4 per nephrology recs. IV tylenol for temp 100.6, SBp 160s presumed uncomfortable.   10/7: BD7, overnight pancultured for temp 101.8F.   10/8: BD8. GEORGE. Cr bumped. decreased LR to 75cc/hr. Adding simethicone ATC. incr hydralazine 50mgTID. Incr labetalol 300mgTID. Na 145, decreased FWF to 250q6. Start precedex. FENa consistent with intrinsic kidney injury. Pend repeat renal US. Retaining up to 1.3L, bladder scans q6, straight cath PRN  10/9: BD 9. GEORGE overnight. Neuro stable. abd xray for distention w non-specific gas pattern, OGT to LIWS for morning. duonebs/mucomyst to q8 for improving secretions. Changed tube feeds to Jevity 1.5 20cc/hr, low rate due to abdominal distention, nepro dense and more difficult to digest. Tolerating CPAP, confirmed by ABG.   10/10: BD 10. GEORGE overnight. Neuro stable. (+) gabriel for urinary retention on bladder scan. inc TF to goal rate of 40cc/hr. family leaning toward pursuing trach/PEG. 1/2 amp for FS 81.   10/11: BD 11. GEORGE overnight. Neuro stable. Trach/PEG consults placed.   10/12: BD 12. GEORGE overnight. Neuro stable. MRI brain complete.   10/13: BD 13. Increase flomax. Hold SQH after PM dose for trach tm. IVL.   10/14: BD 14. GEORGE overnight, remains on AC/VC. Gabriel placed for urinary retention. Dc'd free water.  S/p trach with pulm. NGT placed and CXR confirmed in good position.   10/15: BD 15, GEORGE ovn. resumed feeds. spiked 101, pan cx sent.   10/16: BD 16. GEORGE ovn. Lokelma 5mg for K+ 5.4. Started vanc q 24/zosyn for empiric PNA coverage, IVF to 100/hr. PEG held for fever.   10/17: BD 17,  ordered serum osm and urine osm for am. Started sinemet for neurostimulation. Increased cardura to 0.8. Started FW 100q4, dc'd IVF. MRSA negative, dc'd vanc. NGT replaced d/t coiling.   10/18: BD 18, GEORGE overnight, neuro stable. Amantadine added for neurostim. zosyn changed to unasyn for acinetobacter baumannii, failed TOV and required SC  10/19: BD 19, GEORGE ovn. cardura 2mg added for retention. labetalol decreased 200q8, hydralazine decreased 25q8. Gabriel replaced.   10/20: BD20, GEORGE overnight. NGT dislodged, replaced. PEG tomorrow w/ gen surg, FW increased to 150q4 and labetalol decreased to 100q8, lokelma given for hyperkalemia.   10/21: BD 21. POD0 PEG placement with Gen surg. decr labetolol to 50q8, incr. cardura to 0.4, started lokelma and phoslo, dc gabriel POD0 PEG placement with Gen surg.  10/22: BD 22. Plan to start TF today via PEG. dc labetalol, Following ophtho recs. Increased apnea settings - found to be in cheyne-moe respiration. CPAP 5/5.  10/23: BD 23.       Vital Signs Last 24 Hrs  T(C): 37.4 (22 Oct 2024 22:02), Max: 37.7 (22 Oct 2024 13:00)  T(F): 99.3 (22 Oct 2024 22:02), Max: 99.9 (22 Oct 2024 13:00)  HR: 85 (23 Oct 2024 00:00) (79 - 99)  BP: 124/85 (23 Oct 2024 00:00) (124/81 - 140/89)  BP(mean): 100 (23 Oct 2024 00:00) (97 - 111)  RR: 11 (23 Oct 2024 00:00) (9 - 18)  SpO2: 99% (23 Oct 2024 00:00) (96% - 100%)    Parameters below as of 23 Oct 2024 00:00  Patient On (Oxygen Delivery Method): ventilator, CPAP    O2 Concentration (%): 40    I&O's Summary    21 Oct 2024 07:01  -  22 Oct 2024 07:00  --------------------------------------------------------  IN: 2860 mL / OUT: 3855 mL / NET: -995 mL    22 Oct 2024 07:01  -  23 Oct 2024 00:08  --------------------------------------------------------  IN: 1840 mL / OUT: 2550 mL / NET: -710 mL        PHYSICAL EXAM:  Constitutional: Lying in bed.   HEENT: R eye taped shut. L pupil, round, reactive to light. Face symmetric, tongue midline.   Respiratory: +Trach to vent, No respiratory distress, lungs clear to auscultation bilaterally.   Cardiovascular: RRR, S1, S2.   Gastrointestinal: +PEG, abdomen soft, nondistended.  Neurological:  AAOX0, eyes open spontaneously. does not follow commands, tracks vertically, not horizontally.   Motor: RUE w/d - mimics thumbs up, RLE w/d - moved toes spont, LUE trace w/d, LLE w/d  Extremities: Warm, well perfused.      LABS:                        9.0    6.25  )-----------( 351      ( 22 Oct 2024 07:41 )             28.6     10-22    143  |  111[H]  |  43[H]  ----------------------------<  104[H]  4.9   |  21[L]  |  3.11[H]    Ca    9.1      22 Oct 2024 21:01  Phos  4.8     10-22  Mg     2.1     10-22      PT/INR - ( 21 Oct 2024 04:38 )   PT: 12.3 sec;   INR: 1.05          PTT - ( 21 Oct 2024 04:38 )  PTT:31.6 sec  Urinalysis Basic - ( 22 Oct 2024 21:01 )    Color: x / Appearance: x / SG: x / pH: x  Gluc: 104 mg/dL / Ketone: x  / Bili: x / Urobili: x   Blood: x / Protein: x / Nitrite: x   Leuk Esterase: x / RBC: x / WBC x   Sq Epi: x / Non Sq Epi: x / Bacteria: x          CAPILLARY BLOOD GLUCOSE      POCT Blood Glucose.: 89 mg/dL (22 Oct 2024 15:38)  POCT Blood Glucose.: 86 mg/dL (22 Oct 2024 11:12)      Drug Levels: [] N/A    CSF Analysis: [] N/A      Allergies    Allergy Status Unknown    Intolerances      MEDICATIONS:  Antibiotics:  ampicillin/sulbactam  IVPB 3 Gram(s) IV Intermittent <User Schedule>    Neuro:  acetaminophen   Oral Liquid .. 650 milliGRAM(s) Oral every 6 hours PRN  amantadine 100 milliGRAM(s) Oral <User Schedule>  carbidopa/levodopa  25/100 1 Tablet(s) Oral <User Schedule>  oxyCODONE    Solution 5 milliGRAM(s) Oral every 4 hours PRN    Anticoagulation:  heparin   Injectable 5000 Unit(s) SubCutaneous every 8 hours    OTHER:  acetylcysteine 10%  Inhalation 4 milliLiter(s) Inhalation every 12 hours  albuterol/ipratropium for Nebulization 3 milliLiter(s) Nebulizer every 12 hours  amLODIPine   Tablet 10 milliGRAM(s) Oral daily  artificial tears (preservative free) Ophthalmic Solution 1 Drop(s) Right EYE every 2 hours  bisacodyl Suppository 10 milliGRAM(s) Rectal daily PRN  chlorhexidine 2% Cloths 1 Application(s) Topical <User Schedule>  doxazosin 4 milliGRAM(s) Oral at bedtime  erythromycin   Ointment 1 Application(s) Right EYE every 4 hours  hydrALAZINE 25 milliGRAM(s) Oral every 8 hours  ofloxacin 0.3% Solution 1 Drop(s) Right EYE four times a day  pantoprazole  Injectable 40 milliGRAM(s) IV Push daily  petrolatum Ophthalmic Ointment 1 Application(s) Both EYES two times a day  polyethylene glycol 3350 17 Gram(s) Oral two times a day  senna 2 Tablet(s) Oral at bedtime  sodium zirconium cyclosilicate 5 Gram(s) Oral daily  tamsulosin 0.8 milliGRAM(s) Oral at bedtime    IVF:  calcium acetate 667 milliGRAM(s) Oral three times a day with meals    CULTURES:  Culture Results:   Mixed gram negative rods including  Moderate Acinetobacter baumannii/nosocomialis group (10-16 @ 00:13)  Culture Results:   No growth at 5 days (10-15 @ 14:55)    RADIOLOGY & ADDITIONAL TESTS:      ASSESSMENT:  45 y/o PMH ?stroke/MI present to [facility] after collapsing at work. Decorticate posturing, vomiting, intubated for airway protection. 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 Transferred to [facility] for further management. s/p trach 10/14.     Neuro:  - Neuro q4/vitals q1  - pain control: Tylenol prn, oxy prn  - CTH 9/30: enlarged pontine hemorrhage, CTH 10/3: read stable   - vEEG (10/3-4)- negative, (10/17-10/19) - negative  - stroke core measures, stroke neuro signed off  - MRI brain w/ w/o contrast 10/12: parenchymal hemorrhage, acute/subacute R cerebellar stroke    - neurostimulation: amantadine 100mg qd, sinemet 25/100 BID    CV:  - -150  - HTN: amlodipine 10 mg qd, hydralazine 25 mg q8h   - echo (9/30) EF 75%    Resp:  - trach to vent, CPAP 5/5 as tolerated  - duonebs/mucomyst q12h    GI:  - TF via PEG (placed 10/21 by gen surg)  - bowel reg, LBM 10/22  - GI ppx: protonix while trach to vent    Renal:  - IVL, FW 100q6, normonatremic goal  - hyperK 10/20: lokelma 5mg daily (10/21 -)  - hyperPhos: phoslo 667mg TID (10/21 -)  - Urinary retenion: Flomax 0.8mg, cardura 0.4 mg   - gabriel (10/14-10/18), (10/19-22)  - CKD: trend Cr  - renal US 10/1: echogenicity c/w chronic med renal dz, repeat 10/8: inc renal echogeicity, c/f medical renal disease w increased hydro     Endo:  - A1c 5.4    Heme:  - DVT ppx: SCDs, SQH 5000u q8h     ID:  - last pan cx 10/15  - 10/15 sputum +actinobacter baumanii, on unasyn (10/18-10/23)  - MSSA swab positive (10/17)  - MRSA swab negative (10/2), sputum MSSA + , s/p 1 dose vanc (10/2), CTX (10/2 - 10/4), Ancef (10/4-10/14)     MISC:  - ophtho consult for keratitis, rec erythromycin gtt to rt eye q4hrs, ofloxacin gtt to rt eye QID, artificial tears to rt eye q2hrs, moisture chamber at bedtime    Dispo: NSICU, full code, pending placement    D/w Dr. D'Amico  HPI:  Unknown age male, unknown past medical history (reported stroke and MI by coworkers) presented to [facility] with AMS, Pt was working at [employer] when he was found down by coworkers. EMS called and pt brought to [facility] ED. Intubated, sedated, started on cardene for SBPs in 200s. CT head showed brain stem bleed. Transferred to NSICU for further management.  (30 Sep 2024 12:55)    INTERVAL EVENTS:  S/p PEG - tolerating TF at goal  tracking vertically    HOSPITAL COURSE:  9/30: transferred from [facility]. A line placed. Versed dc'd. Roomcasie Rader at bedside, states pt has family in [place], cannot confirm medications or PMH other than stroke and MI. 250cc bolus 3% given. LR switched to NS. hydralazine 25q8 started, 3% started, switched propofol to precedex   10/1: stability CTH done. Added labetalol, started TF. Palliative consulted. ethics consulted to determine surrogate. febrile 103, pan cx sent  10/2: BD 2, GEORGE overnight. TF resumed. Desatt'd to 80s, FiO2 inc. to 50. Fentanyl given, ABG, CXR ordered. Maxxed on precedex, started on propofol for DARIEN -4 - -5. Precedex dc'd. Duonebs, mucomyst, hypertonic added. 3% dc'd. Cardene dc'd. Start vanc/CTX. Increased labetalol 200q8. MRSA negative, dc'd vanc. ETT pulled back 2cm x 2, good positioning after confirmatory chest xray. Ethics attempting to establish HCP with family. Na 159, starting FW 250q6 for range 150-155.   10/3: BD3, GEORGE o/n, neuro stable. Na elevating, FW increased to 300q6. Dc'd bowel reg for diarrhea. vEEG started. SQH 5000q8 tonight.   10/4: BD 4, albumn bolus, incr. LR to 80 2/2 incr. in Cr, LR to 100cc/hr for uptrending Cr. Started 7% hypersal for 48hrs and SL atropine for inline/oral thick secretions. Dc'd CTX and started ancef for MSSA in the sputum. Nephrology consulted for CKD, f/u recs. SBP 170s, given hydralazine 10mg IVP.   10/5: BD5, o/n 10mg IVP hydralazine given for SBP 170s and started on hydralazine 25q8 via OGT. 10mg IV push labetalol for SBP > 160s. RT placed for diarrhea.   10/6: BD6, o/n FW increased to 350q4 per nephrology recs. IV tylenol for temp 100.6, SBp 160s presumed uncomfortable.   10/7: BD7, overnight pancultured for temp 101.8F.   10/8: BD8. GEORGE. Cr bumped. decreased LR to 75cc/hr. Adding simethicone ATC. incr hydralazine 50mgTID. Incr labetalol 300mgTID. Na 145, decreased FWF to 250q6. Start precedex. FENa consistent with intrinsic kidney injury. Pend repeat renal US. Retaining up to 1.3L, bladder scans q6, straight cath PRN  10/9: BD 9. GEORGE overnight. Neuro stable. abd xray for distention w non-specific gas pattern, OGT to LIWS for morning. duonebs/mucomyst to q8 for improving secretions. Changed tube feeds to Jevity 1.5 20cc/hr, low rate due to abdominal distention, nepro dense and more difficult to digest. Tolerating CPAP, confirmed by ABG.   10/10: BD 10. GEORGE overnight. Neuro stable. (+) gabriel for urinary retention on bladder scan. inc TF to goal rate of 40cc/hr. family leaning toward pursuing trach/PEG. 1/2 amp for FS 81.   10/11: BD 11. GEORGE overnight. Neuro stable. Trach/PEG consults placed.   10/12: BD 12. GEORGE overnight. Neuro stable. MRI brain complete.   10/13: BD 13. Increase flomax. Hold SQH after PM dose for trach tm. IVL.   10/14: BD 14. GEORGE overnight, remains on AC/VC. Gabriel placed for urinary retention. Dc'd free water.  S/p trach with pulm. NGT placed and CXR confirmed in good position.   10/15: BD 15, GEORGE ovn. resumed feeds. spiked 101, pan cx sent.   10/16: BD 16. GEORGE ovn. Lokelma 5mg for K+ 5.4. Started vanc q 24/zosyn for empiric PNA coverage, IVF to 100/hr. PEG held for fever.   10/17: BD 17,  ordered serum osm and urine osm for am. Started sinemet for neurostimulation. Increased cardura to 0.8. Started FW 100q4, dc'd IVF. MRSA negative, dc'd vanc. NGT replaced d/t coiling.   10/18: BD 18, GEORGE overnight, neuro stable. Amantadine added for neurostim. zosyn changed to unasyn for acinetobacter baumannii, failed TOV and required SC  10/19: BD 19, GEORGE ovn. cardura 2mg added for retention. labetalol decreased 200q8, hydralazine decreased 25q8. Gabriel replaced.   10/20: BD20, GEORGE overnight. NGT dislodged, replaced. PEG tomorrow w/ gen surg, FW increased to 150q4 and labetalol decreased to 100q8, lokelma given for hyperkalemia.   10/21: BD 21. POD0 PEG placement with Gen surg. decr labetolol to 50q8, incr. cardura to 0.4, started lokelma and phoslo, dc gabriel POD0 PEG placement with Gen surg.  10/22: BD 22. Plan to start TF today via PEG. dc labetalol, Following ophtho recs. Increased apnea settings - found to be in cheyne-moe respiration. CPAP 5/5.  10/23: BD 23.       Vital Signs Last 24 Hrs  T(C): 37.4 (22 Oct 2024 22:02), Max: 37.7 (22 Oct 2024 13:00)  T(F): 99.3 (22 Oct 2024 22:02), Max: 99.9 (22 Oct 2024 13:00)  HR: 85 (23 Oct 2024 00:00) (79 - 99)  BP: 124/85 (23 Oct 2024 00:00) (124/81 - 140/89)  BP(mean): 100 (23 Oct 2024 00:00) (97 - 111)  RR: 11 (23 Oct 2024 00:00) (9 - 18)  SpO2: 99% (23 Oct 2024 00:00) (96% - 100%)    Parameters below as of 23 Oct 2024 00:00  Patient On (Oxygen Delivery Method): ventilator, CPAP    O2 Concentration (%): 40    I&O's Summary    21 Oct 2024 07:01  -  22 Oct 2024 07:00  --------------------------------------------------------  IN: 2860 mL / OUT: 3855 mL / NET: -995 mL    22 Oct 2024 07:01  -  23 Oct 2024 00:08  --------------------------------------------------------  IN: 1840 mL / OUT: 2550 mL / NET: -710 mL        PHYSICAL EXAM:  Constitutional: Lying in bed.   HEENT: R eye taped shut. L pupil, round, reactive to light. Face symmetric, tongue midline.   Respiratory: +Trach to vent, No respiratory distress, lungs clear to auscultation bilaterally.   Cardiovascular: RRR, S1, S2.   Gastrointestinal: +PEG, abdomen soft, nondistended.  Neurological:  +cough, gag, +b/l corneal reflexes L>R  AAOX0, eyes open spontaneously. does not follow commands, tracks vertically, not horizontally.   Motor: RUE w/d - mimics thumbs up, RLE w/d - moved toes spont, LUE trace w/d, LLE w/d  Extremities: Warm, well perfused.      LABS:                        9.0    6.25  )-----------( 351      ( 22 Oct 2024 07:41 )             28.6     10-22    143  |  111[H]  |  43[H]  ----------------------------<  104[H]  4.9   |  21[L]  |  3.11[H]    Ca    9.1      22 Oct 2024 21:01  Phos  4.8     10-22  Mg     2.1     10-22      PT/INR - ( 21 Oct 2024 04:38 )   PT: 12.3 sec;   INR: 1.05          PTT - ( 21 Oct 2024 04:38 )  PTT:31.6 sec  Urinalysis Basic - ( 22 Oct 2024 21:01 )    Color: x / Appearance: x / SG: x / pH: x  Gluc: 104 mg/dL / Ketone: x  / Bili: x / Urobili: x   Blood: x / Protein: x / Nitrite: x   Leuk Esterase: x / RBC: x / WBC x   Sq Epi: x / Non Sq Epi: x / Bacteria: x          CAPILLARY BLOOD GLUCOSE      POCT Blood Glucose.: 89 mg/dL (22 Oct 2024 15:38)  POCT Blood Glucose.: 86 mg/dL (22 Oct 2024 11:12)      Drug Levels: [] N/A    CSF Analysis: [] N/A      Allergies    Allergy Status Unknown    Intolerances      MEDICATIONS:  Antibiotics:  ampicillin/sulbactam  IVPB 3 Gram(s) IV Intermittent <User Schedule>    Neuro:  acetaminophen   Oral Liquid .. 650 milliGRAM(s) Oral every 6 hours PRN  amantadine 100 milliGRAM(s) Oral <User Schedule>  carbidopa/levodopa  25/100 1 Tablet(s) Oral <User Schedule>  oxyCODONE    Solution 5 milliGRAM(s) Oral every 4 hours PRN    Anticoagulation:  heparin   Injectable 5000 Unit(s) SubCutaneous every 8 hours    OTHER:  acetylcysteine 10%  Inhalation 4 milliLiter(s) Inhalation every 12 hours  albuterol/ipratropium for Nebulization 3 milliLiter(s) Nebulizer every 12 hours  amLODIPine   Tablet 10 milliGRAM(s) Oral daily  artificial tears (preservative free) Ophthalmic Solution 1 Drop(s) Right EYE every 2 hours  bisacodyl Suppository 10 milliGRAM(s) Rectal daily PRN  chlorhexidine 2% Cloths 1 Application(s) Topical <User Schedule>  doxazosin 4 milliGRAM(s) Oral at bedtime  erythromycin   Ointment 1 Application(s) Right EYE every 4 hours  hydrALAZINE 25 milliGRAM(s) Oral every 8 hours  ofloxacin 0.3% Solution 1 Drop(s) Right EYE four times a day  pantoprazole  Injectable 40 milliGRAM(s) IV Push daily  petrolatum Ophthalmic Ointment 1 Application(s) Both EYES two times a day  polyethylene glycol 3350 17 Gram(s) Oral two times a day  senna 2 Tablet(s) Oral at bedtime  sodium zirconium cyclosilicate 5 Gram(s) Oral daily  tamsulosin 0.8 milliGRAM(s) Oral at bedtime    IVF:  calcium acetate 667 milliGRAM(s) Oral three times a day with meals    CULTURES:  Culture Results:   Mixed gram negative rods including  Moderate Acinetobacter baumannii/nosocomialis group (10-16 @ 00:13)  Culture Results:   No growth at 5 days (10-15 @ 14:55)    RADIOLOGY & ADDITIONAL TESTS:      ASSESSMENT:  43 y/o PMH ?stroke/MI present to [facility] after collapsing at work. Decorticate posturing, vomiting, intubated for airway protection. 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 Transferred to [facility] for further management. s/p trach 10/14.     Neuro:  - Neuro q4/vitals q1  - pain control: Tylenol prn, oxy prn  - CTH 9/30: enlarged pontine hemorrhage, CTH 10/3: read stable   - vEEG (10/3-4)- negative, (10/17-10/19) - negative  - stroke core measures, stroke neuro signed off  - MRI brain w/ w/o contrast 10/12: parenchymal hemorrhage, acute/subacute R cerebellar stroke    - neurostimulation: amantadine 100mg qd, sinemet 25/100 BID    CV:  - -150  - HTN: amlodipine 10 mg qd, hydralazine 25 mg q8h   - echo (9/30) EF 75%    Resp:  - trach to vent, CPAP 5/5 as tolerated  - duonebs/mucomyst q12h    GI:  - TF via PEG (placed 10/21 by gen surg)  - bowel reg, LBM 10/22  - GI ppx: protonix while trach to vent    Renal:  - IVL, FW 100q6, normonatremic goal  - hyperK 10/20: lokelma 5mg daily (10/21 -)  - hyperPhos: phoslo 667mg TID (10/21 -)  - Urinary retenion: Flomax 0.8mg, cardura 0.4 mg   - gabriel (10/14-10/18), (10/19-22)  - CKD: trend Cr  - renal US 10/1: echogenicity c/w chronic med renal dz, repeat 10/8: inc renal echogeicity, c/f medical renal disease w increased hydro     Endo:  - A1c 5.4    Heme:  - DVT ppx: SCDs, SQH 5000u q8h     ID:  - last pan cx 10/15  - 10/15 sputum +actinobacter baumanii, on unasyn (10/18-10/23)  - MSSA swab positive (10/17)  - MRSA swab negative (10/2), sputum MSSA + , s/p 1 dose vanc (10/2), CTX (10/2 - 10/4), Ancef (10/4-10/14)     MISC:  - ophtho consult for keratitis, rec erythromycin gtt to rt eye q4hrs, ofloxacin gtt to rt eye QID, artificial tears to rt eye q2hrs, moisture chamber at bedtime    Dispo: NSICU, full code, pending placement    D/w Dr. D'Amico

## 2024-10-23 NOTE — PROGRESS NOTE ADULT - SUBJECTIVE AND OBJECTIVE BOX
=================================  NEUROCRITICAL CARE ATTENDING NOTE  =================================    GARETT DELEON   MRN-[id number]  Summary:  44y/M  unknown past medical history (reported stroke and MI by coworkers) presented to [facility] with AMS, Pt was working at [employer] when he was found down by coworkers. EMS called and pt brought to [facility] ED. Intubated, sedated, started on cardene for SBPs in 200s. CT head showed brain stem bleed. Transferred to NSICU for further management.  (30 Sep 2024 12:55)    COURSE IN THE HOSPITAL:  Date	POD	BD	Events	Surgery  	-	-	Transferred from [facility]. A-line placed. Versed discontinued. Hydralazine, 3% saline started. Changed propofol to precedex.	-  10/1	-	-	Stability CTH done. Added labetalol, started TF. Palliative and ethics consulted. Febrile 103°F, pan cultures sent.	-  10/2	-		Desaturation to 80s, FiO2 increased. Fentanyl given, ABG, CXR ordered. Propofol started. Vanc/CTX started. MRSA negative, vanc discontinued. Na 159, starting FW 250q6.	-  10/3	-	3	Na increasing, FW increased to 300q6. Bowel regimen discontinued. vEEG started.	-  10/4	-		Albumin bolus, increased LR. 7% hypersal started for 48hrs. Nephrology consulted for CKD. SBP 170s, hydralazine given.	-  10/5	-		Hydralazine and labetalol for SBP. RT placed for diarrhea.	-  10/6	-		FW increased to 350q4. IV Tylenol for temp 100.6°F.	-  10/7	-		Pancultured for temp 101.8°F.	-  10/8	-		Cr increase, LR decreased. Simethicone added. Increased hydralazine, labetalol. Na 145, FW 250q6. Retaining fluid, bladder scans ordered.	-  10/9	-		Abd x-ray for distention. Changed tube feeds to Jevity 1.5. Tolerating CPAP.	-  10/10	-	10	Vuong for urinary retention. Increased TF to goal. Family leaning toward trach/PEG.	-  10/11	-	11	Trach/PEG consults placed.	-  10/12	-		MRI brain complete.	-  10/13	-	13	Increased flomax. Hold SQH for trach.	-  10/14	-		S/p trach with pulmonary. NGT placed, CXR confirmed position.	-  10/15	-	15	Resumed feeds. Spiked fever 101°F, pan cultures sent.	-  10/16	-	16	Lokelma for K+ 5.4. Started vanc/zosyn for PNA. PEG held for fever.	-  10/17	-	17	Serum and urine osm ordered. Started sinemet, FW 100q4, IVF discontinued. MRSA negative, vanc discontinued. NGT replaced.	-  10/18	-	18	Amantadine added. Changed zosyn to unasyn for acinetobacter baumannii. Failed TOV, required SC.	-  10/19	-	19	Cardura added for retention. Decreased labetalol, hydralazine. Vuong replaced.	-  10/20	-	20	NGT dislodged, replaced. PEG scheduled for tomorrow. Lokelma for hyperkalemia.  10/21	-	21	s/p PEG  10/22	-	22	No significant events overnight. apnea on CPAP; cheyne stoke - able to CPAP  10/23   -	23	No significant events overnight.     Past Medical History: Acute myocardial infarction CVA (cerebral vascular accident)  Allergies:  Allergy Status Unknown  Home meds:     PHYSICAL EXAMINATION  T(C): 37 (10-23-24 @ 04:55), Max: 37.7 (10-22-24 @ 13:00) HR: 78 (10-23-24 @ 06:06) (77 - 88) BP: 136/93 (10-23-24 @ 05:00) (124/81 - 140/89) RR: 9 (10-23-24 @ 06:06) (9 - 18) SpO2: 100% (10-23-24 @ 06:06) (96% - 100%)  NEUROLOGIC EXAMINATION:  Patient is L eye opens spontaneously, does not FC, tracks, (+) L corneal, weak R corneal, unable to close R eyelid, R UE trace movements on central noxious; L UE trace distal movements to central stimulation ; TF B LE  GENERAL: trached CPAP 10/5 40%  EENT:  anicteric, R eye erythematous  CARDIOVASCULAR: (+) S1 S2, normal rate and regular rhythm  PULMONARY: clear to auscultation bilaterally (q2h suctioning, thick copious)  ABDOMEN: soft, nontender with normoactive bowel sounds  EXTREMITIES: no edema  SKIN: no rash    LABS: 10-23  CAPILLARY BLOOD GLUCOSE 89 86              (6.25)  9.3  (9.0)  6.40  )-----------( 377      ( 23 Oct 2024 05:30 )             30.5     143  |  110[H]  |  41[H]  ----------------------------<  97  4.9   |  20[L]  |  3.06[H] (3.11)    Ca    9.1      23 Oct 2024 05:30  Phos  4.9     10-23  Mg     2.4     10-23    10-22 @ 07:01  -  10-23 @ 07:00  --------------------------------------------------------  IN: 2060 mL / OUT: 2550 mL / NET: -490 mL     Bacteriology:  10/16 sputum culture acinetobacter  10/15 Blood CS NG5D    CSF studies:  EEG:  [email or web address] 9:30 AM	CT Brain	Large pontine hemorrhage, SAH, edema, no hydrocephalus. Multiple old infarcts.  2024 1:11 PM	CTA Head/Neck	Pontine hemorrhage stable, no malformation, stable ventricular size, no stenosis or aneurysm.  2024 6:40 PM	CT Brain	Enlarged pontine hemorrhage, SAH in frontal lobes.  2024        	CT Brain	Stable pontine hemorrhage, SAH stable.  10/03/2024 11:06 AM	CT Brain	Stable hematoma, no rebleeding.  10/12/2024 9:16 AM	MRI Brain	Stable hemorrhage, no new bleeding, cerebellar infarct.    Other imaging:  Date/Time	Type of Study	Results  10/01/2024 11:49 AM	US Retroperit	No hydronephrosis; chronic renal disease; Vuong catheter.  10/01/2024 11:14 AM	XR Abdomen	Orogastric tube coiled in stomach; nonspecific bowel gas pattern.  10/07/2024 10:25 PM	XR Abdomen	Nonspecific bowel gas pattern.  10/08/2024 8:01 AM	US Retroperit	Normal kidneys; distended bladder; mild hydronephrosis.  10/10/2024 7:32 AM	XR Abdomen	Nasogastric tube; nonspecific bowel gas pattern.  10/19/2024 1:13 PM	US DPLX Veins	No DVT in lower extremities.    MEDICATIONS: 10-23    ·	heparin   Injectable 5000 SubCutaneous every 8 hours  ·	ampicillin/sulbactam  IVPB 3 IV Intermittent <User Schedule>  ·	amantadine 100 Oral <User Schedule>  ·	carbidopa/levodopa  25/100 1 Oral <User Schedule>  ·	amLODIPine   Tablet 10 milliGRAM(s) Oral daily  ·	doxazosin 4 milliGRAM(s) Oral at bedtime  ·	hydrALAZINE 25 milliGRAM(s) Oral every 8 hours  ·	acetylcysteine 10%  Inhalation 4 Inhalation every 12 hours  ·	albuterol/ipratropium for Nebulization 3 Nebulizer every 12 hours  ·	pantoprazole  Injectable 40 IV Push daily  ·	polyethylene glycol 3350 17 Oral two times a day  ·	senna 2 Oral at bedtime  ·	artificial tears (preservative free) Ophthalmic Solution 1 Right EYE every 2 hours  ·	calcium acetate 667 Oral three times a day with meals  ·	erythromycin   Ointment 1 Right EYE every 4 hours  ·	ofloxacin 0.3% Solution 1 Right EYE four times a day  ·	petrolatum Ophthalmic Ointment 1 Both EYES two times a day  ·	sodium zirconium cyclosilicate 5 Oral daily  ·	tamsulosin 0.8 Oral at bedtime  ·	acetaminophen   Oral Liquid .. 650 Oral every 6 hours PRN  ·	bisacodyl Suppository 10 Rectal daily PRN  ·	oxyCODONE    Solution 5 Oral every 4 hours PRN     IV FLUIDS: NS@80  DRIPS:  DIET: NPO  Lines:   Drains:    Wounds:    CODE STATUS:  Full Code                       GOALS OF CARE:  aggressive                      DISPOSITION:  ICU  ICH Score 3 =================================  NEUROCRITICAL CARE ATTENDING NOTE  =================================    GARETT DELEON   MRN-[id number]  Summary:  44y/M  unknown past medical history (reported stroke and MI by coworkers) presented to [facility] with AMS, Pt was working at [employer] when he was found down by coworkers. EMS called and pt brought to [facility] ED. Intubated, sedated, started on cardene for SBPs in 200s. CT head showed brain stem bleed. Transferred to NSICU for further management.  (30 Sep 2024 12:55)    COURSE IN THE HOSPITAL:  Date	POD	BD	Events	Surgery  	-	-	Transferred from [facility]. A-line placed. Versed discontinued. Hydralazine, 3% saline started. Changed propofol to precedex.	-  10/1	-	-	Stability CTH done. Added labetalol, started TF. Palliative and ethics consulted. Febrile 103°F, pan cultures sent.	-  10/2	-		Desaturation to 80s, FiO2 increased. Fentanyl given, ABG, CXR ordered. Propofol started. Vanc/CTX started. MRSA negative, vanc discontinued. Na 159, starting FW 250q6.	-  10/3	-	3	Na increasing, FW increased to 300q6. Bowel regimen discontinued. vEEG started.	-  10/4	-		Albumin bolus, increased LR. 7% hypersal started for 48hrs. Nephrology consulted for CKD. SBP 170s, hydralazine given.	-  10/5	-		Hydralazine and labetalol for SBP. RT placed for diarrhea.	-  10/6	-		FW increased to 350q4. IV Tylenol for temp 100.6°F.	-  10/7	-		Pancultured for temp 101.8°F.	-  10/8	-		Cr increase, LR decreased. Simethicone added. Increased hydralazine, labetalol. Na 145, FW 250q6. Retaining fluid, bladder scans ordered.	-  10/9	-		Abd x-ray for distention. Changed tube feeds to Jevity 1.5. Tolerating CPAP.	-  10/10	-	10	Vuong for urinary retention. Increased TF to goal. Family leaning toward trach/PEG.	-  10/11	-	11	Trach/PEG consults placed.	-  10/12	-		MRI brain complete.	-  10/13	-	13	Increased flomax. Hold SQH for trach.	-  10/14	-		S/p trach with pulmonary. NGT placed, CXR confirmed position.	-  10/15	-	15	Resumed feeds. Spiked fever 101°F, pan cultures sent.	-  10/16	-	16	Lokelma for K+ 5.4. Started vanc/zosyn for PNA. PEG held for fever.	-  10/17	-	17	Serum and urine osm ordered. Started sinemet, FW 100q4, IVF discontinued. MRSA negative, vanc discontinued. NGT replaced.	-  10/18	-	18	Amantadine added. Changed zosyn to unasyn for acinetobacter baumannii. Failed TOV, required SC.	-  10/19	-	19	Cardura added for retention. Decreased labetalol, hydralazine. Vuong replaced.	-  10/20	-	20	NGT dislodged, replaced. PEG scheduled for tomorrow. Lokelma for hyperkalemia.  10/21	-	21	s/p PEG  10/22	-	22	No significant events overnight. 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     Past Medical History: Acute myocardial infarction CVA (cerebral vascular accident)  Allergies:  Allergy Status Unknown  Home meds:     PHYSICAL EXAMINATION  T(C): 37 (10-23-24 @ 04:55), Max: 37.7 (10-22-24 @ 13:00) HR: 78 (10-23-24 @ 06:06) (77 - 88) BP: 136/93 (10-23-24 @ 05:00) (124/81 - 140/89) RR: 9 (10-23-24 @ 06:06) (9 - 18) SpO2: 100% (10-23-24 @ 06:06) (96% - 100%)  NEUROLOGIC EXAMINATION:  Patient is Beye opens spontaneously, RANDELL, (+) bilatearl corneals, not FC, tracking with eyes up and down, R UE localizes, B LE withdraws, L UE trace movements, not mimicking; reflexive hand   GENERAL: trached CPAP 5/5 40%  EENT:  anicteric, R eye erythematous  CARDIOVASCULAR: (+) S1 S2, normal rate and regular rhythm  PULMONARY: clear to auscultation bilaterally (q2h suctioning, thick copious)  ABDOMEN: soft, nontender with normoactive bowel sounds  EXTREMITIES: no edema  SKIN: no rash    LABS: 10-23  CAPILLARY BLOOD GLUCOSE 89 86              (6.25)  9.3  (9.0)  6.40  )-----------( 377      ( 23 Oct 2024 05:30 )             30.5     143  |  110[H]  |  41[H]  ----------------------------<  97  4.9   |  20[L]  |  3.06[H] (3.11)    Ca    9.1      23 Oct 2024 05:30  Phos  4.9     10-23  Mg     2.4     10-23    10-22 @ 07:01  -  10-23 @ 07:00  --------------------------------------------------------  IN: 2060 mL / OUT: 2550 mL / NET: -490 mL     Bacteriology:  10/16 sputum culture acinetobacter  10/15 Blood CS NG5D    CSF studies:  EEG:  [email or web address] 9:30 AM	CT Brain	Large pontine hemorrhage, SAH, edema, no hydrocephalus. Multiple old infarcts.  2024 1:11 PM	CTA Head/Neck	Pontine hemorrhage stable, no malformation, stable ventricular size, no stenosis or aneurysm.  2024 6:40 PM	CT Brain	Enlarged pontine hemorrhage, SAH in frontal lobes.  2024        	CT Brain	Stable pontine hemorrhage, SAH stable.  10/03/2024 11:06 AM	CT Brain	Stable hematoma, no rebleeding.  10/12/2024 9:16 AM	MRI Brain	Stable hemorrhage, no new bleeding, cerebellar infarct.    Other imaging:  Date/Time	Type of Study	Results  10/01/2024 11:49 AM	US Retroperit	No hydronephrosis; chronic renal disease; Vuong catheter.  10/01/2024 11:14 AM	XR Abdomen	Orogastric tube coiled in stomach; nonspecific bowel gas pattern.  10/07/2024 10:25 PM	XR Abdomen	Nonspecific bowel gas pattern.  10/08/2024 8:01 AM	US Retroperit	Normal kidneys; distended bladder; mild hydronephrosis.  10/10/2024 7:32 AM	XR Abdomen	Nasogastric tube; nonspecific bowel gas pattern.  10/19/2024 1:13 PM	US DPLX Veins	No DVT in lower extremities.    MEDICATIONS: 10-23    ·	heparin   Injectable 5000 SubCutaneous every 8 hours  ·	ampicillin/sulbactam  IVPB 3 IV Intermittent <User Schedule>  ·	amantadine 100 Oral <User Schedule>  ·	carbidopa/levodopa  25/100 1 Oral <User Schedule>  ·	amLODIPine   Tablet 10 milliGRAM(s) Oral daily  ·	doxazosin 4 milliGRAM(s) Oral at bedtime  ·	hydrALAZINE 25 milliGRAM(s) Oral every 8 hours  ·	acetylcysteine 10%  Inhalation 4 Inhalation every 12 hours  ·	albuterol/ipratropium for Nebulization 3 Nebulizer every 12 hours  ·	pantoprazole  Injectable 40 IV Push daily  ·	polyethylene glycol 3350 17 Oral two times a day  ·	senna 2 Oral at bedtime  ·	artificial tears (preservative free) Ophthalmic Solution 1 Right EYE every 2 hours  ·	calcium acetate 667 Oral three times a day with meals  ·	erythromycin   Ointment 1 Right EYE every 4 hours  ·	ofloxacin 0.3% Solution 1 Right EYE four times a day  ·	petrolatum Ophthalmic Ointment 1 Both EYES two times a day  ·	sodium zirconium cyclosilicate 5 Oral daily  ·	tamsulosin 0.8 Oral at bedtime  ·	acetaminophen   Oral Liquid .. 650 Oral every 6 hours PRN  ·	bisacodyl Suppository 10 Rectal daily PRN  ·	oxyCODONE    Solution 5 Oral every 4 hours PRN     IV FLUIDS: Free 100 q6h  DRIPS:  DIET: Jevity 1.5 @ goal  Lines:   Drains:    Wounds:    CODE STATUS:  Full Code                       GOALS OF CARE:  aggressive                      DISPOSITION:  ICU  ICH Score 3

## 2024-10-24 LAB
ANION GAP SERPL CALC-SCNC: 9 MMOL/L — SIGNIFICANT CHANGE UP (ref 5–17)
BUN SERPL-MCNC: 42 MG/DL — HIGH (ref 7–23)
CALCIUM SERPL-MCNC: 9.2 MG/DL — SIGNIFICANT CHANGE UP (ref 8.4–10.5)
CHLORIDE SERPL-SCNC: 105 MMOL/L — SIGNIFICANT CHANGE UP (ref 96–108)
CO2 SERPL-SCNC: 24 MMOL/L — SIGNIFICANT CHANGE UP (ref 22–31)
CREAT SERPL-MCNC: 2.82 MG/DL — HIGH (ref 0.5–1.3)
EGFR: 27 ML/MIN/1.73M2 — LOW
EGFR: 27 ML/MIN/1.73M2 — LOW
GLUCOSE BLDC GLUCOMTR-MCNC: 103 MG/DL — HIGH (ref 70–99)
GLUCOSE BLDC GLUCOMTR-MCNC: 95 MG/DL — SIGNIFICANT CHANGE UP (ref 70–99)
GLUCOSE SERPL-MCNC: 108 MG/DL — HIGH (ref 70–99)
HCT VFR BLD CALC: 34 % — LOW (ref 39–50)
HGB BLD-MCNC: 10.5 G/DL — LOW (ref 13–17)
MAGNESIUM SERPL-MCNC: 2.3 MG/DL — SIGNIFICANT CHANGE UP (ref 1.6–2.6)
MCHC RBC-ENTMCNC: 27.9 PG — SIGNIFICANT CHANGE UP (ref 27–34)
MCHC RBC-ENTMCNC: 30.9 GM/DL — LOW (ref 32–36)
MCV RBC AUTO: 90.4 FL — SIGNIFICANT CHANGE UP (ref 80–100)
NRBC # BLD: 0 /100 WBCS — SIGNIFICANT CHANGE UP (ref 0–0)
NRBC BLD-RTO: 0 /100 WBCS — SIGNIFICANT CHANGE UP (ref 0–0)
PHOSPHATE SERPL-MCNC: 5 MG/DL — HIGH (ref 2.5–4.5)
PLATELET # BLD AUTO: 372 K/UL — SIGNIFICANT CHANGE UP (ref 150–400)
POTASSIUM SERPL-MCNC: 4.8 MMOL/L — SIGNIFICANT CHANGE UP (ref 3.5–5.3)
POTASSIUM SERPL-SCNC: 4.8 MMOL/L — SIGNIFICANT CHANGE UP (ref 3.5–5.3)
RBC # BLD: 3.76 M/UL — LOW (ref 4.2–5.8)
RBC # FLD: 12.2 % — SIGNIFICANT CHANGE UP (ref 10.3–14.5)
SODIUM SERPL-SCNC: 138 MMOL/L — SIGNIFICANT CHANGE UP (ref 135–145)
WBC # BLD: 6.23 K/UL — SIGNIFICANT CHANGE UP (ref 3.8–10.5)
WBC # FLD AUTO: 6.23 K/UL — SIGNIFICANT CHANGE UP (ref 3.8–10.5)

## 2024-10-24 PROCEDURE — 99291 CRITICAL CARE FIRST HOUR: CPT

## 2024-10-24 RX ORDER — ATROPINE SULFATE 1 %
2 OINTMENT (GRAM) OPHTHALMIC (EYE) EVERY 8 HOURS
Refills: 0 | Status: DISCONTINUED | OUTPATIENT
Start: 2024-10-24 | End: 2024-10-26

## 2024-10-24 RX ORDER — DOXAZOSIN MESYLATE 8 MG/1
8 TABLET ORAL AT BEDTIME
Refills: 0 | Status: DISCONTINUED | OUTPATIENT
Start: 2024-10-24 | End: 2025-02-26

## 2024-10-24 RX ADMIN — ACETYLCYSTEINE 4 MILLILITER(S): 200 INHALANT RESPIRATORY (INHALATION) at 18:07

## 2024-10-24 RX ADMIN — Medication 2 DROP(S): at 18:34

## 2024-10-24 RX ADMIN — OFLOXACIN 1 DROP(S): 3 SOLUTION OPHTHALMIC at 18:34

## 2024-10-24 RX ADMIN — Medication 1 DROP(S): at 12:25

## 2024-10-24 RX ADMIN — IPRATROPIUM BROMIDE AND ALBUTEROL SULFATE 3 MILLILITER(S): .5; 2.5 SOLUTION RESPIRATORY (INHALATION) at 06:00

## 2024-10-24 RX ADMIN — Medication 667 MILLIGRAM(S): at 17:04

## 2024-10-24 RX ADMIN — Medication 1 TABLET(S): at 07:37

## 2024-10-24 RX ADMIN — DOXAZOSIN MESYLATE 8 MILLIGRAM(S): 8 TABLET ORAL at 22:31

## 2024-10-24 RX ADMIN — Medication 1 APPLICATION(S): at 07:26

## 2024-10-24 RX ADMIN — OFLOXACIN 1 DROP(S): 3 SOLUTION OPHTHALMIC at 11:44

## 2024-10-24 RX ADMIN — HEPARIN SODIUM 5000 UNIT(S): 1000 INJECTION INTRAVENOUS; SUBCUTANEOUS at 06:23

## 2024-10-24 RX ADMIN — ERYTHROMYCIN 1 APPLICATION(S): 5 OINTMENT OPHTHALMIC at 07:24

## 2024-10-24 RX ADMIN — ERYTHROMYCIN 1 APPLICATION(S): 5 OINTMENT OPHTHALMIC at 09:58

## 2024-10-24 RX ADMIN — Medication 1 DROP(S): at 16:01

## 2024-10-24 RX ADMIN — Medication 1 DROP(S): at 09:57

## 2024-10-24 RX ADMIN — Medication 1 APPLICATION(S): at 07:25

## 2024-10-24 RX ADMIN — HEPARIN SODIUM 5000 UNIT(S): 1000 INJECTION INTRAVENOUS; SUBCUTANEOUS at 22:31

## 2024-10-24 RX ADMIN — ERYTHROMYCIN 1 APPLICATION(S): 5 OINTMENT OPHTHALMIC at 22:31

## 2024-10-24 RX ADMIN — Medication 667 MILLIGRAM(S): at 07:50

## 2024-10-24 RX ADMIN — Medication 1 DROP(S): at 18:33

## 2024-10-24 RX ADMIN — Medication 1 DROP(S): at 11:45

## 2024-10-24 RX ADMIN — OFLOXACIN 1 DROP(S): 3 SOLUTION OPHTHALMIC at 07:25

## 2024-10-24 RX ADMIN — IPRATROPIUM BROMIDE AND ALBUTEROL SULFATE 3 MILLILITER(S): .5; 2.5 SOLUTION RESPIRATORY (INHALATION) at 18:07

## 2024-10-24 RX ADMIN — Medication 100 MILLIGRAM(S): at 07:24

## 2024-10-24 RX ADMIN — ACETYLCYSTEINE 4 MILLILITER(S): 200 INHALANT RESPIRATORY (INHALATION) at 06:00

## 2024-10-24 RX ADMIN — AMLODIPINE BESYLATE 10 MILLIGRAM(S): 10 TABLET ORAL at 07:24

## 2024-10-24 RX ADMIN — Medication 1 DROP(S): at 07:25

## 2024-10-24 RX ADMIN — Medication 2 DROP(S): at 11:44

## 2024-10-24 RX ADMIN — Medication 1 APPLICATION(S): at 17:04

## 2024-10-24 RX ADMIN — Medication 1 DROP(S): at 21:50

## 2024-10-24 RX ADMIN — Medication 667 MILLIGRAM(S): at 11:44

## 2024-10-24 RX ADMIN — Medication 1 DROP(S): at 08:29

## 2024-10-24 RX ADMIN — HEPARIN SODIUM 5000 UNIT(S): 1000 INJECTION INTRAVENOUS; SUBCUTANEOUS at 12:25

## 2024-10-24 RX ADMIN — ERYTHROMYCIN 1 APPLICATION(S): 5 OINTMENT OPHTHALMIC at 17:04

## 2024-10-24 NOTE — PROGRESS NOTE ADULT - SUBJECTIVE AND OBJECTIVE BOX
=================================  NEUROCRITICAL CARE ATTENDING NOTE  =================================    GARETT DELEON   MRN-[id number]  Summary:  44y/M  unknown past medical history (reported stroke and MI by coworkers) presented to [facility] with AMS, Pt was working at [employer] when he was found down by coworkers. EMS called and pt brought to [facility] ED. Intubated, sedated, started on cardene for SBPs in 200s. CT head showed brain stem bleed. Transferred to NSICU for further management.  (30 Sep 2024 12:55)    COURSE IN THE HOSPITAL:  Date	POD	BD	Events	Surgery  	-	-	Transferred from [facility]. A-line placed. Versed discontinued. Hydralazine, 3% saline started. Changed propofol to precedex.	-  10/1	-	-	Stability CTH done. Added labetalol, started TF. Palliative and ethics consulted. Febrile 103°F, pan cultures sent.	-  10/2	-		Desaturation to 80s, FiO2 increased. Fentanyl given, ABG, CXR ordered. Propofol started. Vanc/CTX started. MRSA negative, vanc discontinued. Na 159, starting FW 250q6.	-  10/3	-	3	Na increasing, FW increased to 300q6. Bowel regimen discontinued. vEEG started.	-  10/4	-		Albumin bolus, increased LR. 7% hypersal started for 48hrs. Nephrology consulted for CKD. SBP 170s, hydralazine given.	-  10/5	-		Hydralazine and labetalol for SBP. RT placed for diarrhea.	-  10/6	-		FW increased to 350q4. IV Tylenol for temp 100.6°F.	-  10/7	-		Pancultured for temp 101.8°F.	-  10/8	-		Cr increase, LR decreased. Simethicone added. Increased hydralazine, labetalol. Na 145, FW 250q6. Retaining fluid, bladder scans ordered.	-  10/9	-		Abd x-ray for distention. Changed tube feeds to Jevity 1.5. Tolerating CPAP.	-  10/10	-	10	Vuong for urinary retention. Increased TF to goal. Family leaning toward trach/PEG.	-  10/11	-		Trach/PEG consults placed.	-  10/12	-		MRI brain complete.	-  10/13	-	13	Increased flomax. Hold SQH for trach.	-  10/14	-		S/p trach with pulmonary. NGT placed, CXR confirmed position.	-  10/15	-	15	Resumed feeds. Spiked fever 101°F, pan cultures sent.	-  10/16	-	16	Lokelma for K+ 5.4. Started vanc/zosyn for PNA. PEG held for fever.	-  10/17	-	17	Serum and urine osm ordered. Started sinemet, FW 100q4, IVF discontinued. MRSA negative, vanc discontinued. NGT replaced.	-  10/18	-	18	Amantadine added. Changed zosyn to unasyn for acinetobacter baumannii. Failed TOV, required SC.	-  10/19	-	19	Cardura added for retention. Decreased labetalol, hydralazine. Vuong replaced.	-  10/20	-	20	NGT dislodged, replaced. PEG scheduled for tomorrow. Lokelma for hyperkalemia.  10/21	-	21	s/p PEG  10/22	-	22	No significant events overnight. 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  trach collar 40%  10/24   -	24	No significant events overnight.     Past Medical History: Acute myocardial infarction CVA (cerebral vascular accident)  Allergies:  Allergy Status Unknown  Home meds:     PHYSICAL EXAMINATION  T(C): 36.3 (10-24-24 @ 05:03), Max: 36.8 (10-23-24 @ 14:17) HR: 72 (10-24-24 @ 04:00) (67 - 92) BP: 149/94 (10-24-24 @ 04:00) (127/85 - 149/94)  RR: 7 (10-24-24 @ 04:00) (7 - 15) SpO2: 100% (10-24-24 @ 04:00) (98% - 100%)  NEUROLOGIC EXAMINATION:  Patient is Beye opens spontaneously, RANDELL, (+) bilatearl corneals, not FC, tracking with eyes up and down, R UE localizes, B LE withdraws, L UE trace movements, not mimicking; reflexive hand   GENERAL: trached CPAP 5/5 40%  EENT:  anicteric, R eye erythematous  CARDIOVASCULAR: (+) S1 S2, normal rate and regular rhythm  PULMONARY: clear to auscultation bilaterally (q2h suctioning, thick copious)  ABDOMEN: soft, nontender with normoactive bowel sounds  EXTREMITIES: no edema  SKIN: no rash    LABS: 10-24  CAPILLARY BLOOD GLUCOSE 123 97     (6.4) 10.5 (9.3)  6.23  )-----------( 372      ( 24 Oct 2024 05:29 )             34.0   (143)  138  |  105  |  42[H]  ----------------------------<  108[H]  4.8   |  24  |  2.82[H] (3.06)    Ca    9.2      24 Oct 2024 05:29  Phos  5.0     10-24  Mg     2.3     10-24    10-23 @ 07:01  -  10-24 @ 07:00  --------------------------------------------------------  IN: 970 mL / OUT: 2600 mL / NET: -1630 mL      Bacteriology:  10/16 sputum culture acinetobacter  10/15 Blood CS NG5D    CSF studies:  EEG:  Neuroimagin2024 9:30 AM	CT Brain	Large pontine hemorrhage, SAH, edema, no hydrocephalus. Multiple old infarcts.  2024 1:11 PM	CTA Head/Neck	Pontine hemorrhage stable, no malformation, stable ventricular size, no stenosis or aneurysm.  2024 6:40 PM	CT Brain	Enlarged pontine hemorrhage, SAH in frontal lobes.  2024        	CT Brain	Stable pontine hemorrhage, SAH stable.  10/03/2024 11:06 AM	CT Brain	Stable hematoma, no rebleeding.  10/12/2024 9:16 AM	MRI Brain	Stable hemorrhage, no new bleeding, cerebellar infarct.    Other imaging:  Date/Time	Type of Study	Results  10/01/2024 11:49 AM	US Retroperit	No hydronephrosis; chronic renal disease; Vuong catheter.  10/01/2024 11:14 AM	XR Abdomen	Orogastric tube coiled in stomach; nonspecific bowel gas pattern.  10/07/2024 10:25 PM	XR Abdomen	Nonspecific bowel gas pattern.  10/08/2024 8:01 AM	US Retroperit	Normal kidneys; distended bladder; mild hydronephrosis.  10/10/2024 7:32 AM	XR Abdomen	Nasogastric tube; nonspecific bowel gas pattern.  10/19/2024 1:13 PM	US DPLX Veins	No DVT in lower extremities.    MEDICATIONS: 10-24    ·	heparin   Injectable 5000 SubCutaneous every 8 hours  ·	amantadine 100 Oral <User Schedule>  ·	carbidopa/levodopa  25/100 1 Oral <User Schedule>  ·	amLODIPine   Tablet 10 milliGRAM(s) Oral daily  ·	doxazosin 4 milliGRAM(s) Oral at bedtime  ·	acetylcysteine 10%  Inhalation 4 Inhalation every 12 hours  ·	albuterol/ipratropium for Nebulization 3 Nebulizer every 12 hours  ·	artificial tears (preservative free) Ophthalmic Solution 1 Right EYE every 2 hours  ·	calcium acetate 667 Oral three times a day with meals  ·	erythromycin   Ointment 1 Right EYE every 4 hours  ·	ofloxacin 0.3% Solution 1 Right EYE four times a day  ·	petrolatum Ophthalmic Ointment 1 Both EYES two times a day  ·	tamsulosin 0.8 Oral at bedtime  ·	acetaminophen   Oral Liquid .. 650 Oral every 6 hours PRN  ·	bisacodyl Suppository 10 Rectal daily PRN  ·	oxyCODONE    Solution 5 Oral every 4 hours PRN    IV FLUIDS: Free 100 q6h  DRIPS:  DIET: Jevity 1.5 @ goal  Lines:   Drains:    Wounds:    CODE STATUS:  Full Code                       GOALS OF CARE:  aggressive                      DISPOSITION:  ICU  ICH Score 3 =================================  NEUROCRITICAL CARE ATTENDING NOTE  =================================    GARETT DELEON   MRN-[id number]  Summary:  44y/M  unknown past medical history (reported stroke and MI by coworkers) presented to [facility] with AMS, Pt was working at [employer] when he was found down by coworkers. EMS called and pt brought to [facility] ED. Intubated, sedated, started on cardene for SBPs in 200s. CT head showed brain stem bleed. Transferred to NSICU for further management.  (30 Sep 2024 12:55)    COURSE IN THE HOSPITAL:  Date	POD	BD	Events	Surgery  	-	-	Transferred from [facility]. A-line placed. Versed discontinued. Hydralazine, 3% saline started. Changed propofol to precedex.	-  10/1	-	-	Stability CTH done. Added labetalol, started TF. Palliative and ethics consulted. Febrile 103°F, pan cultures sent.	-  10/2	-		Desaturation to 80s, FiO2 increased. Fentanyl given, ABG, CXR ordered. Propofol started. Vanc/CTX started. MRSA negative, vanc discontinued. Na 159, starting FW 250q6.	-  10/3	-	3	Na increasing, FW increased to 300q6. Bowel regimen discontinued. vEEG started.	-  10/4	-		Albumin bolus, increased LR. 7% hypersal started for 48hrs. Nephrology consulted for CKD. SBP 170s, hydralazine given.	-  10/5	-		Hydralazine and labetalol for SBP. RT placed for diarrhea.	-  10/6	-		FW increased to 350q4. IV Tylenol for temp 100.6°F.	-  10/7	-		Pancultured for temp 101.8°F.	-  10/8	-		Cr increase, LR decreased. Simethicone added. Increased hydralazine, labetalol. Na 145, FW 250q6. Retaining fluid, bladder scans ordered.	-  10/9	-		Abd x-ray for distention. Changed tube feeds to Jevity 1.5. Tolerating CPAP.	-  10/10	-	10	Vuong for urinary retention. Increased TF to goal. Family leaning toward trach/PEG.	-  10/11	-		Trach/PEG consults placed.	-  10/12	-		MRI brain complete.	-  10/13	-	13	Increased flomax. Hold SQH for trach.	-  10/14	-		S/p trach with pulmonary. NGT placed, CXR confirmed position.	-  10/15	-	15	Resumed feeds. Spiked fever 101°F, pan cultures sent.	-  10/16	-	16	Lokelma for K+ 5.4. Started vanc/zosyn for PNA. PEG held for fever.	-  10/17	-	17	Serum and urine osm ordered. Started sinemet, FW 100q4, IVF discontinued. MRSA negative, vanc discontinued. NGT replaced.	-  10/18	-	18	Amantadine added. Changed zosyn to unasyn for acinetobacter baumannii. Failed TOV, required SC.	-  10/19	-	19	Cardura added for retention. Decreased labetalol, hydralazine. Vuong replaced.	-  10/20	-	20	NGT dislodged, replaced. PEG scheduled for tomorrow. Lokelma for hyperkalemia.  10/21	-	21	s/p PEG  10/22	-	22	No significant events overnight. 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  trach collar 40%  10/24   -	24	No significant events overnight.     Past Medical History: Acute myocardial infarction CVA (cerebral vascular accident)  Allergies:  Allergy Status Unknown  Home meds:     PHYSICAL EXAMINATION  T(C): 36.3 (10-24-24 @ 05:03), Max: 36.8 (10-23-24 @ 14:17) HR: 72 (10-24-24 @ 04:00) (67 - 92) BP: 149/94 (10-24-24 @ 04:00) (127/85 - 149/94)  RR: 7 (10-24-24 @ 04:00) (7 - 15) SpO2: 100% (10-24-24 @ 04:00) (98% - 100%)  NEUROLOGIC EXAMINATION:  Patient is Beye opens spontaneously, RANDELL, (+) bilatearl corneals, not FC, tracking with eyes up and down, R UE localizes, B LE withdraws, L UE trace movements, not mimicking; reflexive hand   GENERAL: trached CPAP 5/5 40%  EENT:  anicteric, R eye erythematous  CARDIOVASCULAR: (+) S1 S2, normal rate and regular rhythm  PULMONARY: clear to auscultation bilaterally (q2h suctioning, thick copious)  ABDOMEN: soft, nontender with normoactive bowel sounds  EXTREMITIES: no edema  SKIN: no rash    LABS: 10-24  CAPILLARY BLOOD GLUCOSE 123 97     (6.4) 10.5 (9.3)  6.23  )-----------( 372      ( 24 Oct 2024 05:29 )             34.0   (143)  138  |  105  |  42[H]  ----------------------------<  108[H]  4.8   |  24  |  2.82[H] (3.06)    Ca    9.2      24 Oct 2024 05:29  Phos  5.0     10-24  Mg     2.3     10-24    10-23 @ 07:01  -  10-24 @ 07:00  --------------------------------------------------------  IN: 970 mL / OUT: 2600 mL / NET: -1630 mL     ABG - ( 23 Oct 2024 23:00 ) pH, Arterial: 7.33  pH, Blood: x     /  pCO2: 43    /  pO2: 99    / HCO3: 23    / Base Excess: -3.2  /  SaO2: 98.5   - mild respiratory acidosis     Bacteriology:  10/16 sputum culture acinetobacter  10/15 Blood CS NG5D    CSF studies:  EEG:  [email or web address] 9:30 AM	CT Brain	Large pontine hemorrhage, SAH, edema, no hydrocephalus. Multiple old infarcts.  2024 1:11 PM	CTA Head/Neck	Pontine hemorrhage stable, no malformation, stable ventricular size, no stenosis or aneurysm.  2024 6:40 PM	CT Brain	Enlarged pontine hemorrhage, SAH in frontal lobes.  2024        	CT Brain	Stable pontine hemorrhage, SAH stable.  10/03/2024 11:06 AM	CT Brain	Stable hematoma, no rebleeding.  10/12/2024 9:16 AM	MRI Brain	Stable hemorrhage, no new bleeding, cerebellar infarct.    Other imaging:  Date/Time	Type of Study	Results  10/01/2024 11:49 AM	US Retroperit	No hydronephrosis; chronic renal disease; Vuong catheter.  10/01/2024 11:14 AM	XR Abdomen	Orogastric tube coiled in stomach; nonspecific bowel gas pattern.  10/07/2024 10:25 PM	XR Abdomen	Nonspecific bowel gas pattern.  10/08/2024 8:01 AM	US Retroperit	Normal kidneys; distended bladder; mild hydronephrosis.  10/10/2024 7:32 AM	XR Abdomen	Nasogastric tube; nonspecific bowel gas pattern.  10/19/2024 1:13 PM	US DPLX Veins	No DVT in lower extremities.    MEDICATIONS: 10-24    ·	heparin   Injectable 5000 SubCutaneous every 8 hours  ·	amantadine 100 Oral <User Schedule>  ·	carbidopa/levodopa  25/100 1 Oral <User Schedule>  ·	amLODIPine   Tablet 10 milliGRAM(s) Oral daily  ·	doxazosin 4 milliGRAM(s) Oral at bedtime  ·	acetylcysteine 10%  Inhalation 4 Inhalation every 12 hours  ·	albuterol/ipratropium for Nebulization 3 Nebulizer every 12 hours  ·	artificial tears (preservative free) Ophthalmic Solution 1 Right EYE every 2 hours  ·	calcium acetate 667 Oral three times a day with meals  ·	erythromycin   Ointment 1 Right EYE every 4 hours  ·	ofloxacin 0.3% Solution 1 Right EYE four times a day  ·	petrolatum Ophthalmic Ointment 1 Both EYES two times a day  ·	tamsulosin 0.8 Oral at bedtime  ·	acetaminophen   Oral Liquid .. 650 Oral every 6 hours PRN  ·	bisacodyl Suppository 10 Rectal daily PRN  ·	oxyCODONE    Solution 5 Oral every 4 hours PRN    IV FLUIDS: Free 100 q6h  DRIPS:  DIET: Jevity 1.5 @ goal  Lines:   Drains:    Wounds:    CODE STATUS:  Full Code                       GOALS OF CARE:  aggressive                      DISPOSITION:  ICU  ICH Score 3 =================================  NEUROCRITICAL CARE ATTENDING NOTE  =================================    GARETT DELEON   MRN-[id number]  Summary:  44y/M  unknown past medical history (reported stroke and MI by coworkers) presented to [facility] with AMS, Pt was working at [employer] when he was found down by coworkers. EMS called and pt brought to [facility] ED. Intubated, sedated, started on cardene for SBPs in 200s. CT head showed brain stem bleed. Transferred to NSICU for further management.  (30 Sep 2024 12:55)    COURSE IN THE HOSPITAL:  Date	POD	BD	Events	Surgery  	-	-	Transferred from [facility]. A-line placed. Versed discontinued. Hydralazine, 3% saline started. Changed propofol to precedex.	-  10/1	-	-	Stability CTH done. Added labetalol, started TF. Palliative and ethics consulted. Febrile 103°F, pan cultures sent.	-  10/2	-		Desaturation to 80s, FiO2 increased. Fentanyl given, ABG, CXR ordered. Propofol started. Vanc/CTX started. MRSA negative, vanc discontinued. Na 159, starting FW 250q6.	-  10/3	-	3	Na increasing, FW increased to 300q6. Bowel regimen discontinued. vEEG started.	-  10/4	-		Albumin bolus, increased LR. 7% hypersal started for 48hrs. Nephrology consulted for CKD. SBP 170s, hydralazine given.	-  10/5	-		Hydralazine and labetalol for SBP. RT placed for diarrhea.	-  10/6	-		FW increased to 350q4. IV Tylenol for temp 100.6°F.	-  10/7	-		Pancultured for temp 101.8°F.	-  10/8	-		Cr increase, LR decreased. Simethicone added. Increased hydralazine, labetalol. Na 145, FW 250q6. Retaining fluid, bladder scans ordered.	-  10/9	-		Abd x-ray for distention. Changed tube feeds to Jevity 1.5. Tolerating CPAP.	-  10/10	-	10	Vuong for urinary retention. Increased TF to goal. Family leaning toward trach/PEG.	-  10/11	-		Trach/PEG consults placed.	-  10/12	-		MRI brain complete.	-  10/13	-	13	Increased flomax. Hold SQH for trach.	-  10/14	-		S/p trach with pulmonary. NGT placed, CXR confirmed position.	-  10/15	-	15	Resumed feeds. Spiked fever 101°F, pan cultures sent.	-  10/16	-	16	Lokelma for K+ 5.4. Started vanc/zosyn for PNA. PEG held for fever.	-  10/17	-	17	Serum and urine osm ordered. Started sinemet, FW 100q4, IVF discontinued. MRSA negative, vanc discontinued. NGT replaced.	-  10/18	-	18	Amantadine added. Changed zosyn to unasyn for acinetobacter baumannii. Failed TOV, required SC.	-  10/19	-	19	Cardura added for retention. Decreased labetalol, hydralazine. Vuong replaced.	-  10/20	-	20	NGT dislodged, replaced. PEG scheduled for tomorrow. Lokelma for hyperkalemia.  10/21	-	21	s/p PEG  10/22	-	22	No significant events overnight. 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  trach collar 40%  10/24   -	24	No significant events overnight.     Past Medical History: Acute myocardial infarction CVA (cerebral vascular accident)  Allergies:  Allergy Status Unknown  Home meds:     PHYSICAL EXAMINATION  T(C): 36.3 (10-24-24 @ 05:03), Max: 36.8 (10-23-24 @ 14:17) HR: 72 (10-24-24 @ 04:00) (67 - 92) BP: 149/94 (10-24-24 @ 04:00) (127/85 - 149/94)  RR: 7 (10-24-24 @ 04:00) (7 - 15) SpO2: 100% (10-24-24 @ 04:00) (98% - 100%)  NEUROLOGIC EXAMINATION:  Patient is B eye opens spontaneously, R2 L3 brisk, (+) bilateral corneals, not FC, tracking with eyes up and down, R UE localizes, B LE withdraws, L UE trace movements, not mimicking; reflexive hand   GENERAL: trach collar 40%  EENT:  anicteric, R eye erythematous  CARDIOVASCULAR: (+) S1 S2, normal rate and regular rhythm  PULMONARY: clear to auscultation bilaterally (q2h suctioning oral secretions, coughing sputum from trach)  ABDOMEN: soft, nontender with normoactive bowel sounds  EXTREMITIES: no edema  SKIN: no rash    LABS: 10-24  CAPILLARY BLOOD GLUCOSE 123 97     (6.4) 10.5 (9.3)  6.23  )-----------( 372      ( 24 Oct 2024 05:29 )             34.0   (143)  138  |  105  |  42[H]  ----------------------------<  108[H]  4.8   |  24  |  2.82[H] (3.06)    Ca    9.2      24 Oct 2024 05:29  Phos  5.0     10-24  Mg     2.3     10-24    10-23 @ 07:01  -  10-24 @ 07:00  --------------------------------------------------------  IN: 970 mL / OUT: 2600 mL / NET: -1630 mL     ABG - ( 23 Oct 2024 23:00 ) pH, Arterial: 7.33  pH, Blood: x     /  pCO2: 43    /  pO2: 99    / HCO3: 23    / Base Excess: -3.2  /  SaO2: 98.5   - mild respiratory acidosis     Bacteriology:  10/16 sputum culture acinetobacter  10/15 Blood CS NG5D    CSF studies:  EEG:  [email or web address] 9:30 AM	CT Brain	Large pontine hemorrhage, SAH, edema, no hydrocephalus. Multiple old infarcts.  2024 1:11 PM	CTA Head/Neck	Pontine hemorrhage stable, no malformation, stable ventricular size, no stenosis or aneurysm.  2024 6:40 PM	CT Brain	Enlarged pontine hemorrhage, SAH in frontal lobes.  2024        	CT Brain	Stable pontine hemorrhage, SAH stable.  10/03/2024 11:06 AM	CT Brain	Stable hematoma, no rebleeding.  10/12/2024 9:16 AM	MRI Brain	Stable hemorrhage, no new bleeding, cerebellar infarct.    Other imaging:  Date/Time	Type of Study	Results  10/01/2024 11:49 AM	US Retroperit	No hydronephrosis; chronic renal disease; Vunog catheter.  10/01/2024 11:14 AM	XR Abdomen	Orogastric tube coiled in stomach; nonspecific bowel gas pattern.  10/07/2024 10:25 PM	XR Abdomen	Nonspecific bowel gas pattern.  10/08/2024 8:01 AM	US Retroperit	Normal kidneys; distended bladder; mild hydronephrosis.  10/10/2024 7:32 AM	XR Abdomen	Nasogastric tube; nonspecific bowel gas pattern.  10/19/2024 1:13 PM	US DPLX Veins	No DVT in lower extremities.    MEDICATIONS: 10-24    ·	heparin   Injectable 5000 SubCutaneous every 8 hours  ·	amantadine 100 Oral <User Schedule>  ·	carbidopa/levodopa  25/100 1 Oral <User Schedule>  ·	amLODIPine   Tablet 10 milliGRAM(s) Oral daily  ·	doxazosin 4 milliGRAM(s) Oral at bedtime  ·	acetylcysteine 10%  Inhalation 4 Inhalation every 12 hours  ·	albuterol/ipratropium for Nebulization 3 Nebulizer every 12 hours  ·	artificial tears (preservative free) Ophthalmic Solution 1 Right EYE every 2 hours  ·	calcium acetate 667 Oral three times a day with meals  ·	erythromycin   Ointment 1 Right EYE every 4 hours  ·	ofloxacin 0.3% Solution 1 Right EYE four times a day  ·	petrolatum Ophthalmic Ointment 1 Both EYES two times a day  ·	tamsulosin 0.8 Oral at bedtime  ·	acetaminophen   Oral Liquid .. 650 Oral every 6 hours PRN  ·	bisacodyl Suppository 10 Rectal daily PRN  ·	oxyCODONE    Solution 5 Oral every 4 hours PRN    IV FLUIDS: Free 100 q6h  DRIPS:  DIET: Jevity 1.5 @ goal  Lines:   Drains:    Wounds:    CODE STATUS:  Full Code                       GOALS OF CARE:  aggressive                      DISPOSITION:  ICU  ICH Score 3

## 2024-10-24 NOTE — PROGRESS NOTE ADULT - SUBJECTIVE AND OBJECTIVE BOX
HPI:  Unknown age male, unknown past medical history (reported stroke and MI by coworkers) presented to [facility] with AMS, Pt was working at [employer] when he was found down by coworkers. EMS called and pt brought to [facility] ED. Intubated, sedated, started on cardene for SBPs in 200s. CT head showed brain stem bleed. Transferred to NSICU for further management.  (30 Sep 2024 12:55)      Subjective:  o/n lokelma held due to diarrhea. Free water 100q6 resumed. ABG sent.     Hospital Course:   9/30: transferred from [facility]. A line placed. Versed dc'd. Roomate Prasanth at bedside, states pt has family in [place], cannot confirm medications or PMH other than stroke and MI. 250cc bolus 3% given. LR switched to NS. hydralazine 25q8 started, 3% started, switched propofol to precedex   10/1: stability CTH done. Added labetalol, started TF. Palliative consulted. ethics consulted to determine surrogate. febrile 103, pan cx sent  10/2: BD 2, GEORGE overnight. TF resumed. Desatt'd to 80s, FiO2 inc. to 50. Fentanyl given, ABG, CXR ordered. Maxxed on precedex, started on propofol for DARIEN -4 - -5. Precedex dc'd. Duonebs, mucomyst, hypertonic added. 3% dc'd. Cardene dc'd. Start vanc/CTX. Increased labetalol 200q8. MRSA negative, dc'd vanc. ETT pulled back 2cm x 2, good positioning after confirmatory chest xray. Ethics attempting to establish HCP with family. Na 159, starting FW 250q6 for range 150-155.   10/3: BD3, GEORGE o/n, neuro stable. Na elevating, FW increased to 300q6. Dc'd bowel reg for diarrhea. vEEG started. SQH 5000q8 tonight.   10/4: BD 4, albumn bolus, incr. LR to 80 2/2 incr. in Cr, LR to 100cc/hr for uptrending Cr. Started 7% hypersal for 48hrs and SL atropine for inline/oral thick secretions. Dc'd CTX and started ancef for MSSA in the sputum. Nephrology consulted for CKD, f/u recs. SBP 170s, given hydralazine 10mg IVP.   10/5: BD5, o/n 10mg IVP hydralazine given for SBP 170s and started on hydralazine 25q8 via OGT. 10mg IV push labetalol for SBP > 160s. RT placed for diarrhea.   10/6: BD6, o/n FW increased to 350q4 per nephrology recs. IV tylenol for temp 100.6, SBp 160s presumed uncomfortable.   10/7: BD7, overnight pancultured for temp 101.8F.   10/8: BD8. GEORGE. Cr bumped. decreased LR to 75cc/hr. Adding simethicone ATC. incr hydralazine 50mgTID. Incr labetalol 300mgTID. Na 145, decreased FWF to 250q6. Start precedex. FENa consistent with intrinsic kidney injury. Pend repeat renal US. Retaining up to 1.3L, bladder scans q6, straight cath PRN  10/9: BD 9. GEORGE overnight. Neuro stable. abd xray for distention w non-specific gas pattern, OGT to LIWS for morning. duonebs/mucomyst to q8 for improving secretions. Changed tube feeds to Jevity 1.5 20cc/hr, low rate due to abdominal distention, nepro dense and more difficult to digest. Tolerating CPAP, confirmed by ABG.   10/10: BD 10. GEORGE overnight. Neuro stable. (+) gabriel for urinary retention on bladder scan. inc TF to goal rate of 40cc/hr. family leaning toward pursuing trach/PEG. 1/2 amp for FS 81.   10/11: BD 11. GEORGE overnight. Neuro stable. Trach/PEG consults placed.   10/12: BD 12. GEORGE overnight. Neuro stable. MRI brain complete.   10/13: BD 13. Increase flomax. Hold SQH after PM dose for trach tm. IVL.   10/14: BD 14. GEORGE overnight, remains on AC/VC. Gabriel placed for urinary retention. Dc'd free water.  S/p trach with pulm. NGT placed and CXR confirmed in good position.   10/15: BD 15, GEORGE ovn. resumed feeds. spiked 101, pan cx sent.   10/16: BD 16. GEORGE ovn. Lokelma 5mg for K+ 5.4. Started vanc q 24/zosyn for empiric PNA coverage, IVF to 100/hr. PEG held for fever.   10/17: BD 17,  ordered serum osm and urine osm for am. Started sinemet for neurostimulation. Increased cardura to 0.8. Started FW 100q4, dc'd IVF. MRSA negative, dc'd vanc. NGT replaced d/t coiling.   10/18: BD 18, GEORGE overnight, neuro stable. Amantadine added for neurostim. zosyn changed to unasyn for acinetobacter baumannii, failed TOV and required SC  10/19: BD 19, GEORGE ovn. cardura 2mg added for retention. labetalol decreased 200q8, hydralazine decreased 25q8. Gabriel replaced.   10/20: BD20, GEORGE overnight. NGT dislodged, replaced. PEG tomorrow w/ gen surg, FW increased to 150q4 and labetalol decreased to 100q8, lokelma given for hyperkalemia.   10/21: BD 21. POD0 PEG placement with Gen surg. decr labetolol to 50q8, incr. cardura to 0.4, started lokelma and phoslo, dc gabriel POD0 PEG placement with Gen surg.  10/22: BD 22. Plan to start TF today via PEG. dc labetalol, Following ophtho recs. Increased apnea settings - found to be in cheyne-moe respiration. CPAP 5/5.  10/23: BD 23. hydralazine d/c'd, trach collar trial today. Rectal tube placed at 6am.  10/24: BD24, o/n lokelma held due to diarrhea. Free water 100q6 resumed.     Vital Signs Last 24 Hrs  T(C): 36.4 (23 Oct 2024 22:54), Max: 37.3 (23 Oct 2024 01:27)  T(F): 97.5 (23 Oct 2024 22:54), Max: 99.1 (23 Oct 2024 01:27)  HR: 68 (23 Oct 2024 21:30) (68 - 92)  BP: 142/92 (23 Oct 2024 20:00) (126/87 - 146/99)  BP(mean): 109 (23 Oct 2024 20:00) (101 - 118)  RR: 10 (23 Oct 2024 20:00) (8 - 15)  SpO2: 98% (23 Oct 2024 21:30) (97% - 100%)    Parameters below as of 23 Oct 2024 21:30  Patient On (Oxygen Delivery Method): tracheostomy collar    O2 Concentration (%): 40    I&O's Detail    22 Oct 2024 07:01  -  23 Oct 2024 07:00  --------------------------------------------------------  IN:    Enteral Tube Flush: 320 mL    Free Water: 300 mL    IV PiggyBack: 500 mL    Jevity 1.5: 600 mL    sodium chloride 0.9%: 720 mL  Total IN: 2440 mL    OUT:    Intermittent Catheterization - Urethral (mL): 3150 mL  Total OUT: 3150 mL    Total NET: -710 mL      23 Oct 2024 07:01  -  24 Oct 2024 00:20  --------------------------------------------------------  IN:    Enteral Tube Flush: 290 mL    Free Water: 100 mL    IV PiggyBack: 100 mL    Jevity 1.5: 480 mL  Total IN: 970 mL    OUT:    Intermittent Catheterization - Urethral (mL): 2400 mL    Rectal Tube (mL): 200 mL  Total OUT: 2600 mL    Total NET: -1630 mL        I&O's Summary    22 Oct 2024 07:01  -  23 Oct 2024 07:00  --------------------------------------------------------  IN: 2440 mL / OUT: 3150 mL / NET: -710 mL    23 Oct 2024 07:01  -  24 Oct 2024 00:20  --------------------------------------------------------  IN: 970 mL / OUT: 2600 mL / NET: -1630 mL        PHYSICAL EXAM:  General: patient seen laying supine in bed in NAD  Neuro: OEs to noxious stimuli,  +cough/gag, RUE withdraws to noxious. b/l lower extremites withdraw to noxious. LUE trace withdrawal to noxious. Does not follow commands.   HEENT: PERRL, only tracks vertically, +trach collar  Neck: supple  Cardiac: RRR, S1S2  Pulmonary: chest rise symmetric  Abdomen: soft, nontender, nondistended, +PEG  Ext: perfusing well  Skin: warm, dry      LABS:                        9.3    6.40  )-----------( 377      ( 23 Oct 2024 05:30 )             30.5     10-23    143  |  110[H]  |  41[H]  ----------------------------<  97  4.9   |  20[L]  |  3.06[H]    Ca    9.1      23 Oct 2024 05:30  Phos  4.9     10-23  Mg     2.4     10-23        Urinalysis Basic - ( 23 Oct 2024 05:30 )    Color: x / Appearance: x / SG: x / pH: x  Gluc: 97 mg/dL / Ketone: x  / Bili: x / Urobili: x   Blood: x / Protein: x / Nitrite: x   Leuk Esterase: x / RBC: x / WBC x   Sq Epi: x / Non Sq Epi: x / Bacteria: x          CAPILLARY BLOOD GLUCOSE      POCT Blood Glucose.: 123 mg/dL (23 Oct 2024 17:11)  POCT Blood Glucose.: 97 mg/dL (23 Oct 2024 11:47)      Drug Levels: [] N/A    CSF Analysis: [] N/A      Allergies    Allergy Status Unknown    Intolerances      MEDICATIONS:  Antibiotics:    Neuro:  acetaminophen   Oral Liquid .. 650 milliGRAM(s) Oral every 6 hours PRN  amantadine 100 milliGRAM(s) Oral <User Schedule>  carbidopa/levodopa  25/100 1 Tablet(s) Oral <User Schedule>  oxyCODONE    Solution 5 milliGRAM(s) Oral every 4 hours PRN    Anticoagulation:  heparin   Injectable 5000 Unit(s) SubCutaneous every 8 hours    OTHER:  acetylcysteine 10%  Inhalation 4 milliLiter(s) Inhalation every 12 hours  albuterol/ipratropium for Nebulization 3 milliLiter(s) Nebulizer every 12 hours  amLODIPine   Tablet 10 milliGRAM(s) Oral daily  artificial tears (preservative free) Ophthalmic Solution 1 Drop(s) Right EYE every 2 hours  bisacodyl Suppository 10 milliGRAM(s) Rectal daily PRN  chlorhexidine 2% Cloths 1 Application(s) Topical <User Schedule>  doxazosin 4 milliGRAM(s) Oral at bedtime  erythromycin   Ointment 1 Application(s) Right EYE every 4 hours  ofloxacin 0.3% Solution 1 Drop(s) Right EYE four times a day  petrolatum Ophthalmic Ointment 1 Application(s) Both EYES two times a day  tamsulosin 0.8 milliGRAM(s) Oral at bedtime    IVF:  calcium acetate 667 milliGRAM(s) Oral three times a day with meals    CULTURES:  Culture Results:   Mixed gram negative rods including  Moderate Acinetobacter baumannii/nosocomialis group (10-16 @ 00:13)  Culture Results:   No growth at 5 days (10-15 @ 14:55)    RADIOLOGY & ADDITIONAL TESTS:    AMS    Handoff    Acute myocardial infarction    CVA (cerebral vascular accident)    Intracerebral hemorrhage of brain stem    Brainstem stroke    Brain stem stroke syndrome    Brain stem hemorrhage    Brain stem stroke syndrome    Hemorrhagic stroke    Brainstem stroke    Encephalopathy acute    Functional quadriplegia    Advanced care planning/counseling discussion    Encounter for palliative care    Percutaneous tracheal puncture    Altered mental status examination    EGD, with PEG    AMS    [email or web address]        ASSESSMENT:  43 y/o PMH ?stroke/MI present to [facility] after collapsing at work. Decorticate posturing, vomiting, intubated for airway protection. 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 Transferred to [facility] for further management. s/p trach 10/14.     Neuro:  - Neuro q4/vitals q1  - pain control: Tylenol prn, oxy prn  - CTH 9/30: enlarged pontine hemorrhage, CTH 10/3: read stable   - vEEG (10/3-4)- negative, (10/17-10/19) - negative  - stroke core measures, stroke neuro signed off  - MRI brain w/ w/o contrast 10/12: parenchymal hemorrhage, acute/subacute R cerebellar stroke    - neurostimulation: amantadine 100mg qd, sinemet 25/100 BID    CV:  - -160  - HTN: amlodipine 10 mg qd  - echo (9/30) EF 75%    Resp:  - tolerating trach collar  - duonebs/mucomyst q12h    GI:  - TF via PEG (placed 10/21 by gen surg)  - bowel reg held for loose stool, LBM 10/23  - rectal tube (10/23-    Renal:  - IVL, normonatremic goal  - free water 100cc q6  - SC prn  - hyperK 10/20: lokelma dc'd (10/21 -10/23)  - hyperPhos: phoslo 667mg TID (10/21 -)  - Urinary retenion: Flomax 0.8mg, cardura 4 mg   - gabriel (10/14-10/18), (10/19-22)  - CKD: trend Cr  - renal US 10/1: echogenicity c/w chronic med renal dz, repeat 10/8: inc renal echogeicity, c/f medical renal disease w increased hydro     Endo:  - A1c 5.4    Heme:  - DVT ppx: SCDs, SQH 5000u q8h     ID:  - 10/15 sputum +actinobacter baumanii, s/p unasyn (10/18-10/23)  - MRSA swab negative (10/2), sputum MSSA + , s/p 1 dose vanc (10/2), CTX (10/2 - 10/4), Ancef (10/4-10/14)     MISC:  - ophtho consult for keratitis, rec erythromycin gtt to rt eye q4hrs, ofloxacin gtt to rt eye QID, artificial tears to rt eye q2hrs, moisture chamber at bedtime    Dispo: NSICU, full code, pending placement    D/w Dr. D'Amico and Dr. Tomlin      Assessment:  Present when checked    []  GCS  E   V  M     Heart Failure: []Acute, [] acute on chronic , []chronic  Heart Failure:  [] Diastolic (HFpEF), [] Systolic (HFrEF), []Combined (HFpEF and HFrEF), [] RHF, [] Pulm HTN, [] Other    [] ANIKA, [] ATN, [] AIN, [] other  [] CKD1, [] CKD2, [] CKD 3, [] CKD 4, [] CKD 5, []ESRD    Encephalopathy: [] Metabolic, [] Hepatic, [] toxic, [] Neurological, [] Other    Abnormal Nurtitional Status: [] malnurtition (see nutrition note), [ ]underweight: BMI < 19, [] morbid obesity: BMI >40, [] Cachexia    [] Sepsis  [] hypovolemic shock,[] cardiogenic shock, [] hemorrhagic shock, [] neuogenic shock  [] Acute Respiratory Failure  []Cerebral edema, [] Brain compression/ herniation,   [] Functional quadriplegia  [] Acute blood loss anemia

## 2024-10-24 NOTE — PROGRESS NOTE ADULT - ASSESSMENT
44y/M with  large pontine hemorrhage, SAH, brain edema; ?locked-in  CVA  acute MI  acute on chronic CKD, hydronephrosis    PLAN:   NEURO: neurochecks q4h, VSq1 PRN pain meds with oxycodone   neurostim: sinemet / amantadine  palliative / ethics follow-up  off VEEG - neg  REHAB:  physical therapy evaluation and management    EARLY MOB:  bedrest HOB up    PULM:  trach collar; pulmo toilette, nebs  CARDIO:  SBP goal 100-160mm Hg,  amlodipine; d/c hydralazine  ENDO:  Blood sugar goals 140-180 mg/dL, A1C 5.4, ISS  GI:  PPI for GI prophylaxis; s/p PEG, d/c bowel regimen  DIET: cont TF today  RENAL:  Na goal 135-145, Vuong, tamsulosin / doxazosin 4; cont  q6h, continue phosphate binder and sodium zirconium  HEM/ONC:  Hb stable  VTE Prophylaxis: SCDs, SQH  ID: afebrile, no leukocytosis; on Unasyn for A. baumanii, MSSA+ (last day 10/23)  Social: will update family  MISC: palliative discussions with family; ofloxacin and erythromycin to R eye    Active issues:  What's keeping patient in the ICU? vented  What is this patient's dispo plan? wean to trach collar and stepdown vs if unable to - will stepdown to vent bed    ATTENDING ATTESTATION:  I was physically present for the key portions of the evaluation and management (E/M) service provided.  I agree with the above history, physical and plan, which I have reviewed and edited where appropriate.    Patient at high risk for neurological deterioration or death due to:  ICU delirium, aspiration PNA, DVT / PE.  Critical care time:  I have personally provided 60 minutes of critical care time, excluding time spent on separate procedures.      Plan discussed with RN, house staff. 44y/M with  large pontine hemorrhage, SAH, brain edema; ?locked-in  CVA  acute MI  acute on chronic CKD, hydronephrosis    PLAN:   NEURO: neurochecks q4h, VSq2 PRN pain meds with oxycodone   neurostim: sinemet /, d/c amantadine  palliative / ethics follow-up  off VEEG - neg  REHAB:  physical therapy evaluation and management    EARLY MOB:  bedrest HOB up    PULM:  trach collar 40%; pulmo toilette, nebs, atropine SL  CARDIO:  SBP goal 100-160mm Hg,  amlodipine  ENDO:  Blood sugar goals 140-180 mg/dL, A1C 5.4, ISS  GI:  off PPI s/p PEG, d/c bowel regimen  DIET: switch TF to nepro  RENAL:  Na goal 135-145, Vuong, d/c tamsulosin increase doxazosin to 8 ; d/c FW, IVC US continue phosphate binder off sodium zirconium  HEM/ONC:  Hb stable  VTE Prophylaxis: SCDs, SQH  ID: afebrile, no leukocytosis; on Unasyn for A. baumanii, MSSA+ (last day 10/23)  Social: will update family  MISC: palliative discussions with family; ofloxacin and erythromycin to R eye    Active issues:  What's keeping patient in the ICU? q2h suctioning  What is this patient's dispo plan? wean to trach collar and stepdown vs if unable to - will stepdown to vent bed    ATTENDING ATTESTATION:  I was physically present for the key portions of the evaluation and management (E/M) service provided.  I agree with the above history, physical and plan, which I have reviewed and edited where appropriate.    Patient at high risk for neurological deterioration or death due to:  ICU delirium, aspiration PNA, DVT / PE.  Critical care time:  I have personally provided 60 minutes of critical care time, excluding time spent on separate procedures.      Plan discussed with RN, house staff. 44y/M with  large pontine hemorrhage, SAH, brain edema; ?locked-in  CVA  h/o MI  acute on chronic CKD, hydronephrosis    PLAN:   NEURO: neurochecks q4h, VSq2 PRN pain meds with oxycodone   neurostim: sinemet /, d/c amantadine  palliative / ethics follow-up  off VEEG - neg  REHAB:  physical therapy evaluation and management    EARLY MOB:  bedrest HOB up    PULM:  trach collar 40%; pulmo toilette, nebs, atropine SL  CARDIO:  SBP goal 100-160mm Hg,  amlodipine  ENDO:  Blood sugar goals 140-180 mg/dL, A1C 5.4, ISS  GI:  off PPI s/p PEG, d/c bowel regimen  DIET: switch TF to nepro  RENAL:  Na goal 135-145, Vuong, d/c tamsulosin increase doxazosin to 8 ; d/c FW, IVC US continue phosphate binder off sodium zirconium  HEM/ONC:  Hb stable  VTE Prophylaxis: SCDs, SQH  ID: afebrile, no leukocytosis; on Unasyn for A. baumanii, MSSA+ (last day 10/23)  Social: will update family  MISC: palliative discussions with family; ofloxacin and erythromycin to R eye    Active issues:  What's keeping patient in the ICU? q2h suctioning  What is this patient's dispo plan? wean to trach collar and stepdown vs if unable to - will stepdown to vent bed    ATTENDING ATTESTATION:  I was physically present for the key portions of the evaluation and management (E/M) service provided.  I agree with the above history, physical and plan, which I have reviewed and edited where appropriate.    Patient at high risk for neurological deterioration or death due to:  ICU delirium, aspiration PNA, DVT / PE.  Critical care time:  I have personally provided 60 minutes of critical care time, excluding time spent on separate procedures.      Plan discussed with RN, house staff.

## 2024-10-24 NOTE — PROGRESS NOTE ADULT - SUBJECTIVE AND OBJECTIVE BOX
Neurocritical Care Attending Note     GARETT REYES   MRN-[id number]  Summary:  44y/M  unknown past medical history (reported stroke and MI by coworkers) presented to [facility] with AMS, Pt was working at [employer] when he was found down by coworkers. EMS called and pt brought to [facility] ED. Intubated, sedated, started on cardene for SBPs in 200s. CT head showed brain stem bleed. Transferred to NSICU for further management.  (30 Sep 2024 12:55)    Hospital Course/Interval Events  9/30: transferred from [facility]. A line placed. Versed dc'd. Roomate Prasanth at bedside, states pt has family in [place], cannot confirm medications or PMH other than stroke and MI. 250cc bolus 3% given. LR switched to NS. hydralazine 25q8 started, 3% started, switched propofol to precedex   10/1: stability CTH done. Added labetalol, started TF. Palliative consulted. ethics consulted to determine surrogate. febrile 103, pan cx sent  10/2: BD 2, GEORGE overnight. TF resumed. Desatt'd to 80s, FiO2 inc. to 50. Fentanyl given, ABG, CXR ordered. Maxxed on precedex, started on propofol for DARIEN -4 - -5. Precedex dc'd. Duonebs, mucomyst, hypertonic added. 3% dc'd. Cardene dc'd. Start vanc/CTX. Increased labetalol 200q8. MRSA negative, dc'd vanc. ETT pulled back 2cm x 2, good positioning after confirmatory chest xray. Ethics attempting to establish HCP with family. Na 159, starting FW 250q6 for range 150-155.   10/3: BD3, GEORGE o/n, neuro stable. Na elevating, FW increased to 300q6. Dc'd bowel reg for diarrhea. vEEG started. SQH 5000q8 tonight.   10/4: BD 4, albumn bolus, incr. LR to 80 2/2 incr. in Cr, LR to 100cc/hr for uptrending Cr. Started 7% hypersal for 48hrs and SL atropine for inline/oral thick secretions. Dc'd CTX and started ancef for MSSA in the sputum. Nephrology consulted for CKD, f/u recs. SBP 170s, given hydralazine 10mg IVP.   10/5: BD5, o/n 10mg IVP hydralazine given for SBP 170s and started on hydralazine 25q8 via OGT. 10mg IV push labetalol for SBP > 160s. RT placed for diarrhea.   10/6: BD6, o/n FW increased to 350q4 per nephrology recs. IV tylenol for temp 100.6, SBp 160s presumed uncomfortable.   10/7: BD7, overnight pancultured for temp 101.8F.   10/8: BD8. GEORGE. Cr bumped. decreased LR to 75cc/hr. Adding simethicone ATC. incr hydralazine 50mgTID. Incr labetalol 300mgTID. Na 145, decreased FWF to 250q6. Start precedex. FENa consistent with intrinsic kidney injury. Pend repeat renal US. Retaining up to 1.3L, bladder scans q6, straight cath PRN  10/9: BD 9. GEORGE overnight. Neuro stable. abd xray for distention w non-specific gas pattern, OGT to LIWS for morning. duonebs/mucomyst to q8 for improving secretions. Changed tube feeds to Jevity 1.5 20cc/hr, low rate due to abdominal distention, nepro dense and more difficult to digest. Tolerating CPAP, confirmed by ABG.   10/10: BD 10. GEORGE overnight. Neuro stable. (+) gabriel for urinary retention on bladder scan. inc TF to goal rate of 40cc/hr. family leaning toward pursuing trach/PEG. 1/2 amp for FS 81.   10/11: BD 11. GEORGE overnight. Neuro stable. Trach/PEG consults placed.   10/12: BD 12. GEORGE overnight. Neuro stable. MRI brain complete.   10/13: BD 13. Increase flomax. Hold SQH after PM dose for trach tm. IVL.   10/14: BD 14. GEORGE overnight, remains on AC/VC. Gabriel placed for urinary retention. Dc'd free water.  S/p trach with pulm. NGT placed and CXR confirmed in good position.   10/15: BD 15, GEORGE ovn. resumed feeds. spiked 101, pan cx sent.   10/16: BD 16. GEORGE ovn. Lokelma 5mg for K+ 5.4. Started vanc q 24/zosyn for empiric PNA coverage, IVF to 100/hr. PEG held for fever.   10/17: Na low, ordered serum osm and urine osm for am. Started sinemet for neurostimulation. Increased cardura to 0.8. Started FW 100q4, dc'd IVF.   10/18: BD 18, GEORGE overnight, neuro stable. Amantadine added for neurostim.   10/19: BD 19, GEORGE ovn. cardura 2mg added for retention. labetalol decreased 200q8, hydralazine decreased 25q8. Gabriel replaced.   10/20: BD20, GEORGE overnight. NGT dislodged, replaced.   10/21: BD 21. POD0 PEG placement with Gen surg. decr labetolol to 50q8, incr. cardura to 0.4, started lokelma and phoslo, dc gabriel POD0 PEG placement with Gen surg.  10/22: BD 22. Plan to start TF today via PEG. dc labetalol, Following ophtho recs. Increased apnea settings - found to be in cheyne-moe respiration. CPAP 5/5.  10/23: BD 23. hydralazine d/c'd, trach collar trial today. Rectal tube placed at 6am.  10/24: BD24, o/n lokelma held due to diarrhea. Free water 100q6 resumed. dc'd tamsulosin, amantadine. Incr'd cardura to 8mg qhs. Dc'd FW. Switched jevity to nepro. gabriel placed for high urine output. Started SL atropine for oral secretions. Dc'd free water.       MEDICATIONS  (STANDING):  acetylcysteine 10%  Inhalation 4 milliLiter(s) Inhalation every 12 hours  albuterol/ipratropium for Nebulization 3 milliLiter(s) Nebulizer every 12 hours  amLODIPine   Tablet 10 milliGRAM(s) Oral daily  artificial tears (preservative free) Ophthalmic Solution 1 Drop(s) Right EYE every 2 hours  atropine 1% Ophthalmic Solution for SubLingual Use 2 Drop(s) SubLingual every 8 hours  calcium acetate 667 milliGRAM(s) Oral three times a day with meals  carbidopa/levodopa  25/100 1 Tablet(s) Oral <User Schedule>  chlorhexidine 2% Cloths 1 Application(s) Topical <User Schedule>  doxazosin 8 milliGRAM(s) Oral at bedtime  erythromycin   Ointment 1 Application(s) Right EYE every 4 hours  heparin   Injectable 5000 Unit(s) SubCutaneous every 8 hours  ofloxacin 0.3% Solution 1 Drop(s) Right EYE four times a day  petrolatum Ophthalmic Ointment 1 Application(s) Both EYES two times a day    MEDICATIONS  (PRN):  acetaminophen   Oral Liquid .. 650 milliGRAM(s) Oral every 6 hours PRN Temp greater or equal to 38C (100.4F), Mild Pain (1 - 3)  bisacodyl Suppository 10 milliGRAM(s) Rectal daily PRN Constipation  oxyCODONE    Solution 5 milliGRAM(s) Oral every 4 hours PRN Moderate Pain (4 - 6)        Physical Exam  ICU Vital Signs Last 24 Hrs  T(C): 36.7 (24 Oct 2024 17:18), Max: 36.7 (24 Oct 2024 17:18)  T(F): 98.1 (24 Oct 2024 17:18), Max: 98.1 (24 Oct 2024 17:18)  HR: 71 (24 Oct 2024 18:00) (67 - 97)  BP: 126/86 (24 Oct 2024 18:00) (126/86 - 154/90)  BP(mean): 102 (24 Oct 2024 18:00) (100 - 118)    RR: 10 (24 Oct 2024 18:00) (7 - 20)  SpO2: 98% (24 Oct 2024 18:00) (98% - 100%)    O2 Parameters below as of 24 Oct 2024 18:00  Patient On (Oxygen Delivery Method): tracheostomy collar  O2 Flow (L/min): 15  O2 Concentration (%): 40    Mode: standby        Gen: no apparent distress  HEENT right eye conjunctival injection and corneal abrasion, neck supple, tracheostomy in place   RESP: No respiratory distress, CTA b/l, no WRR  CV: RRR, +S1S2  GI: Soft, NT, ND rectal tube in place  Extr: non edematous, peripheral pulses present symmetric   NEURO:   Mental Status: sleeping, not opening to verbal stimulus, trace opening to noxious, intermittent downward gaze/ocular bobbing, can track vertically, appears to have impaired horizontal bilateral eyes movement - not following commands   CNs: Disconjugated gaze, left eye some abduction and adduction movements noted, spontaneous vs. purposeful downward intermittent eye movement, pupils equally round and reactive to light right larger than left, corneal + L, trace on right,  cough ++, breathing over vent  Motor: head/neck movement towards noxious, extensor posturing to noxious at the bilateral upper extremities, triple flexion at the bilateral lower extremities     ASSESSMENT/PLAN  45 y/o PMH ?stroke/MI present to [facility] after collapsing at work. Decorticate posturing, vomiting, intubated for airway protection. 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 Transferred to [facility] for further management. s/p trach 10/14. Course complicated by persistent encephalopathy/wakeful unresponsiveness, respiratory failure, VAP.     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   Data   - CTA head and neck 09/30: Re-demonstration of pontine hemorrhage with associated edema and mass effect upon the fourth ventricle and cerebral aqueduct. No underlying no malformation is identified. No increase in ventricular size since the   prior study.  - CT head 10/03: Again noted is a parenchymal hematoma in the nabil, with associated  expansion and surrounding vasogenic edema. There is mild spillage into the subarachnoid spaces at the cistern and within sulci in the right frontal lobe. All of these findings appear stable, without interval rebleeding. There is a chronic hemorrhagic infarct in the left external capsule/corona radiata. There is a small chronic white matter infarct vs small vessel disease in the right corona radiata. Moderate generalized cerebral volume loss, with distention of the sulci and concomitant ex-vacuo ventricular dilatation. Mild nonspecific low attenuation in the periventricular and subcortical white matter. No midline shift or herniation. No CT evidence of acute territorial infarction, although MRI with DWI would be more sensitive. Limited views of the sinuses and mastoids show mild mucosal thickening without air-fluid levels, likely chronic. An ETT is noted in place. Limited views of the orbits and visualized soft tissues of the neck, face, scalp, skull base, and calvarium are otherwise unremarkable. IMPRESSION: No significant change, without interval rebleeding.  - MRI brain 10/12: Similar-appearing evolving acute parenchymal hemorrhage within the nabil with surrounding mass effect and edema. No underlying enhancement. No new areas of acute intracranial hemorrhage. Wedge-shaped acute/subacute infarct within the right lateral cerebellar hemisphere. Unchanged encephalomalacia and gliosis within the left external capsule related to remote hemorrhage in this location.  - vEEG 10/17: with predominant delta slowing with superimposed geo and beta/some reactivity present     Plan  Neuro check q4 and vitals q1  BP goal 100-160, MAP > 65  Sinemet and Amantadine to promote awakening   STAT CT head for any acute neurological change   Off sedation  Oxycodone/Tylenol for pain PRN  Plan for long term vent rehab facility   Opthalmology following       CV   Diagnosis: Uncontrolled hypertension at presentation; now resolved   Data  HR: 71 (24 Oct 2024 18:00) (67 - 97)  BP: 126/86 (24 Oct 2024 18:00) (126/86 - 154/90)  BP(mean): 102 (24 Oct 2024 18:00) (100 - 118)  ECHO 9/30 hyperdynamic left ventricular function EF 70%  Plan  SBP goal 100-150, MAP > 65  Continue amlodipine 10 mg daily   (off hydralazine and labetalol)  Telemetry monitoring    PULM   Diagnosis: Intubated for airway protection in the setting of acute encephalopathy; persistent respiratory failure; S/P tracheostomy; VAP,  trach collar since 10/23 AM  Data  RR: 10 (24 Oct 2024 18:00) (7 - 20)  SpO2: 98% (24 Oct 2024 18:00) (98% - 100%)  Patient On (Oxygen Delivery Method): tracheostomy collar  O2 Flow (L/min): 15  O2 Concentration (%): 40  Mode: standby  Chest x-ray 10/17: Similar to prior exam 10/16/2024 with right lung base partial atelectasis. Nasogastric tube tip below hemidiaphragm. No pneumothorax. No acute infiltrate appearing. Possible minimal right pleural effusion. No left pleural effusion.  Chest x-ray 10/21:  Two views of chest obtained at bedside. NG tube in stomach. Small discoid atelectasis right perihilar region. No pleural effusion.  Cardia mediastinal silhouette unremarkable. Degenerative changes of spine.  IMPRESSION: Small atelectasis right perihilar region is unchanged.  ABG - ( 23 Oct 2024 23:00 )  pH, Arterial: 7.33  pH, Blood: x     /  pCO2: 43    /  pO2: 99    / HCO3: 23    / Base Excess: -3.2  /  SaO2: 98.5    Plan   Continue trach collar   Pulm toilet/chest PT q4/Nebsq12  pulse-oxymetry monitoring   Antibiotics for VAP    RENAL   Diagnosis: ANIKA on CKD (unknown baseline) - - > stable; urinary retention requiring Gabriel placement; intermittent hyperkalemia   Data  10-24    138  |  105  |  42[H]  ----------------------------<  108[H]  4.8   |  24  |  2.82[H]    Ca    9.2      24 Oct 2024 05:29  Phos  5.0     10-24  Mg     2.3     10-24    I&O's Summary    23 Oct 2024 07:01  -  24 Oct 2024 07:00  --------------------------------------------------------  IN: 970 mL / OUT: 4400 mL / NET: -3430 mL    24 Oct 2024 07:01  -  24 Oct 2024 18:38  --------------------------------------------------------  IN: 420 mL / OUT: 1089 mL / NET: -669 mL    Plan   Euvolemia/normonatremia goal  KVO  Monitor for hyperkalemia/monitor I/o  Gabriel due to refractory urinary retention   Tamsulosin + Doxazosin   Renal following   Avoid nephrotoxic medications    ID  Diagnosis: Afebrile, normal white count; s/p Acitenobacter B. Vent Associated PNA  Data  T(C): 36.7 (24 Oct 2024 17:18), Max: 36.7 (24 Oct 2024 17:18)  WBC 6.23   Blood Cx 10/15: NGTD   Sputum Cx 10/16: Acinetobacter B.   Procalcitonin 10/16: 0.36 <-- 0.13   Plan   - S/p  7-day antibiotics Vanc and Zosyn ---> narrowed to Unasyn (10/16 - 10/23) for Acitenobacter B PNA  - S/P Ancef  - monitor WBC and fever curve   - culture if spikes 38.3C    GI  Diagnosis: Dysphagia secondary to encephalopathy/hemorrhagic stroke s/p PEG placement (10/21 by Surgery)    Data  Last Bowel Movement: 24-Oct-2024 (10-24-24 @ 08:00)  Last Bowel Movement: 23-Oct-2024 (10-23-24 @ 21:00)  Last Bowel Movement: 23-Oct-2024 (10-23-24 @ 08:00)  Plan   S/p PEG 10/21  Enteral feeding via PEG   GI prophylaxis while intubated   Holding bowel regimen given diarrhea    HEME/ONC  Diagnosis: no active issues   Data                                 10.5   6.23  )-----------( 372      ( 24 Oct 2024 05:29 )             34.0       LE venous ultrasound 10/19: negative for DVT     Plan   Monitor H&H  Hbg goal > 7.0, PLT > 100  SDCs + heparin 5000 q8 for DVT prophylaxis    ENDO  Diagnosis: no active issues   Data  A1c: 5.3  CAPILLARY BLOOD GLUCOSE  POCT Blood Glucose.: 95 mg/dL (24 Oct 2024 16:18)  POCT Blood Glucose.: 103 mg/dL (24 Oct 2024 11:07)    Plan:   Goal euglycemia (-180)  Avoid hypoglycemic episodes      CODE STATUS: FULL CODE  Patient's son (Chauncey Reyes) updated at bedside with . He understands the current clinical condition of his father and would like for him to continue receiving full life sustaining measures.     DISPO:   NeuroICU  Tentative plan to transfer to Vent step down unit when able tolerate trach collar for 24-48 hours        Jazlyn Tomlin MD   Neurocritical Care Attending                Neurocritical Care Attending Note     GARETT REYES   MRN-[id number]  Summary:  44y/M  unknown past medical history (reported stroke and MI by coworkers) presented to [facility] with AMS, Pt was working at [employer] when he was found down by coworkers. EMS called and pt brought to [facility] ED. Intubated, sedated, started on cardene for SBPs in 200s. CT head showed brain stem bleed. Transferred to NSICU for further management.  (30 Sep 2024 12:55)    Hospital Course/Interval Events  9/30: transferred from [facility]. A line placed. Versed dc'd. Roomate Prasanth at bedside, states pt has family in [place], cannot confirm medications or PMH other than stroke and MI. 250cc bolus 3% given. LR switched to NS. hydralazine 25q8 started, 3% started, switched propofol to precedex   10/1: stability CTH done. Added labetalol, started TF. Palliative consulted. ethics consulted to determine surrogate. febrile 103, pan cx sent  10/2: BD 2, GEORGE overnight. TF resumed. Desatt'd to 80s, FiO2 inc. to 50. Fentanyl given, ABG, CXR ordered. Maxxed on precedex, started on propofol for DARIEN -4 - -5. Precedex dc'd. Duonebs, mucomyst, hypertonic added. 3% dc'd. Cardene dc'd. Start vanc/CTX. Increased labetalol 200q8. MRSA negative, dc'd vanc. ETT pulled back 2cm x 2, good positioning after confirmatory chest xray. Ethics attempting to establish HCP with family. Na 159, starting FW 250q6 for range 150-155.   10/3: BD3, GEORGE o/n, neuro stable. Na elevating, FW increased to 300q6. Dc'd bowel reg for diarrhea. vEEG started. SQH 5000q8 tonight.   10/4: BD 4, albumn bolus, incr. LR to 80 2/2 incr. in Cr, LR to 100cc/hr for uptrending Cr. Started 7% hypersal for 48hrs and SL atropine for inline/oral thick secretions. Dc'd CTX and started ancef for MSSA in the sputum. Nephrology consulted for CKD, f/u recs. SBP 170s, given hydralazine 10mg IVP.   10/5: BD5, o/n 10mg IVP hydralazine given for SBP 170s and started on hydralazine 25q8 via OGT. 10mg IV push labetalol for SBP > 160s. RT placed for diarrhea.   10/6: BD6, o/n FW increased to 350q4 per nephrology recs. IV tylenol for temp 100.6, SBp 160s presumed uncomfortable.   10/7: BD7, overnight pancultured for temp 101.8F.   10/8: BD8. GEORGE. Cr bumped. decreased LR to 75cc/hr. Adding simethicone ATC. incr hydralazine 50mgTID. Incr labetalol 300mgTID. Na 145, decreased FWF to 250q6. Start precedex. FENa consistent with intrinsic kidney injury. Pend repeat renal US. Retaining up to 1.3L, bladder scans q6, straight cath PRN  10/9: BD 9. GEORGE overnight. Neuro stable. abd xray for distention w non-specific gas pattern, OGT to LIWS for morning. duonebs/mucomyst to q8 for improving secretions. Changed tube feeds to Jevity 1.5 20cc/hr, low rate due to abdominal distention, nepro dense and more difficult to digest. Tolerating CPAP, confirmed by ABG.   10/10: BD 10. GEORGE overnight. Neuro stable. (+) gabriel for urinary retention on bladder scan. inc TF to goal rate of 40cc/hr. family leaning toward pursuing trach/PEG. 1/2 amp for FS 81.   10/11: BD 11. GEORGE overnight. Neuro stable. Trach/PEG consults placed.   10/12: BD 12. GEORGE overnight. Neuro stable. MRI brain complete.   10/13: BD 13. Increase flomax. Hold SQH after PM dose for trach tm. IVL.   10/14: BD 14. GEORGE overnight, remains on AC/VC. Gabriel placed for urinary retention. Dc'd free water.  S/p trach with pulm. NGT placed and CXR confirmed in good position.   10/15: BD 15, GEORGE ovn. resumed feeds. spiked 101, pan cx sent.   10/16: BD 16. GEORGE ovn. Lokelma 5mg for K+ 5.4. Started vanc q 24/zosyn for empiric PNA coverage, IVF to 100/hr. PEG held for fever.   10/17: Na low, ordered serum osm and urine osm for am. Started sinemet for neurostimulation. Increased cardura to 0.8. Started FW 100q4, dc'd IVF.   10/18: BD 18, GEORGE overnight, neuro stable. Amantadine added for neurostim.   10/19: BD 19, GEORGE ovn. cardura 2mg added for retention. labetalol decreased 200q8, hydralazine decreased 25q8. Gabriel replaced.   10/20: BD20, GEORGE overnight. NGT dislodged, replaced.   10/21: BD 21. POD0 PEG placement with Gen surg. decr labetolol to 50q8, incr. cardura to 0.4, started lokelma and phoslo, dc gabriel POD0 PEG placement with Gen surg.  10/22: BD 22. Plan to start TF today via PEG. dc labetalol, Following ophtho recs. Increased apnea settings - found to be in cheyne-ome respiration. CPAP 5/5.  10/23: BD 23. hydralazine d/c'd, trach collar trial today. Rectal tube placed at 6am.  10/24: BD24, o/n lokelma held due to diarrhea. Free water 100q6 resumed. dc'd tamsulosin, amantadine. Incr'd cardura to 8mg qhs. Dc'd FW. Switched jevity to nepro. gabriel placed for high urine output. Started SL atropine for oral secretions. Dc'd free water.       MEDICATIONS  (STANDING):  acetylcysteine 10%  Inhalation 4 milliLiter(s) Inhalation every 12 hours  albuterol/ipratropium for Nebulization 3 milliLiter(s) Nebulizer every 12 hours  amLODIPine   Tablet 10 milliGRAM(s) Oral daily  artificial tears (preservative free) Ophthalmic Solution 1 Drop(s) Right EYE every 2 hours  atropine 1% Ophthalmic Solution for SubLingual Use 2 Drop(s) SubLingual every 8 hours  calcium acetate 667 milliGRAM(s) Oral three times a day with meals  carbidopa/levodopa  25/100 1 Tablet(s) Oral <User Schedule>  chlorhexidine 2% Cloths 1 Application(s) Topical <User Schedule>  doxazosin 8 milliGRAM(s) Oral at bedtime  erythromycin   Ointment 1 Application(s) Right EYE every 4 hours  heparin   Injectable 5000 Unit(s) SubCutaneous every 8 hours  ofloxacin 0.3% Solution 1 Drop(s) Right EYE four times a day  petrolatum Ophthalmic Ointment 1 Application(s) Both EYES two times a day    MEDICATIONS  (PRN):  acetaminophen   Oral Liquid .. 650 milliGRAM(s) Oral every 6 hours PRN Temp greater or equal to 38C (100.4F), Mild Pain (1 - 3)  bisacodyl Suppository 10 milliGRAM(s) Rectal daily PRN Constipation  oxyCODONE    Solution 5 milliGRAM(s) Oral every 4 hours PRN Moderate Pain (4 - 6)        Physical Exam  ICU Vital Signs Last 24 Hrs  T(C): 36.7 (24 Oct 2024 17:18), Max: 36.7 (24 Oct 2024 17:18)  T(F): 98.1 (24 Oct 2024 17:18), Max: 98.1 (24 Oct 2024 17:18)  HR: 71 (24 Oct 2024 18:00) (67 - 97)  BP: 126/86 (24 Oct 2024 18:00) (126/86 - 154/90)  BP(mean): 102 (24 Oct 2024 18:00) (100 - 118)    RR: 10 (24 Oct 2024 18:00) (7 - 20)  SpO2: 98% (24 Oct 2024 18:00) (98% - 100%)    O2 Parameters below as of 24 Oct 2024 18:00  Patient On (Oxygen Delivery Method): tracheostomy collar  O2 Flow (L/min): 15  O2 Concentration (%): 40    Mode: standby        Gen: no apparent distress  HEENT right eye conjunctival injection and corneal abrasion, neck supple, tracheostomy in place   RESP: No respiratory distress, CTA b/l, no WRR  CV: RRR, +S1S2  GI: Soft, NT, ND rectal tube in place  Extr: non edematous, peripheral pulses present symmetric   NEURO:   Mental Status: sleeping, not opening to verbal stimulus, trace opening to noxious, intermittent downward gaze/ocular bobbing, can track vertically, appears to have impaired horizontal bilateral eyes movement - not following commands   CNs: Disconjugated gaze, left eye some abduction and adduction movements noted, spontaneous vs. purposeful downward intermittent eye movement, pupils equally round and reactive to light right larger than left, corneal + L, trace on right,  cough ++, breathing over vent  Motor: head/neck movement towards noxious, extensor posturing to noxious at the bilateral upper extremities, triple flexion at the bilateral lower extremities     ASSESSMENT/PLAN  43 y/o PMH ?stroke/MI present to [facility] after collapsing at work. Decorticate posturing, vomiting, intubated for airway protection. 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 Transferred to [facility] for further management. s/p trach 10/14. Course complicated by persistent encephalopathy/wakeful unresponsiveness, respiratory failure, VAP.     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   Data   - CTA head and neck 09/30: Re-demonstration of pontine hemorrhage with associated edema and mass effect upon the fourth ventricle and cerebral aqueduct. No underlying no malformation is identified. No increase in ventricular size since the   prior study.  - CT head 10/03: Again noted is a parenchymal hematoma in the nabil, with associated  expansion and surrounding vasogenic edema. There is mild spillage into the subarachnoid spaces at the cistern and within sulci in the right frontal lobe. All of these findings appear stable, without interval rebleeding. There is a chronic hemorrhagic infarct in the left external capsule/corona radiata. There is a small chronic white matter infarct vs small vessel disease in the right corona radiata. Moderate generalized cerebral volume loss, with distention of the sulci and concomitant ex-vacuo ventricular dilatation. Mild nonspecific low attenuation in the periventricular and subcortical white matter. No midline shift or herniation. No CT evidence of acute territorial infarction, although MRI with DWI would be more sensitive. Limited views of the sinuses and mastoids show mild mucosal thickening without air-fluid levels, likely chronic. An ETT is noted in place. Limited views of the orbits and visualized soft tissues of the neck, face, scalp, skull base, and calvarium are otherwise unremarkable. IMPRESSION: No significant change, without interval rebleeding.  - MRI brain 10/12: Similar-appearing evolving acute parenchymal hemorrhage within the nabil with surrounding mass effect and edema. No underlying enhancement. No new areas of acute intracranial hemorrhage. Wedge-shaped acute/subacute infarct within the right lateral cerebellar hemisphere. Unchanged encephalomalacia and gliosis within the left external capsule related to remote hemorrhage in this location.  - vEEG 10/17: with predominant delta slowing with superimposed geo and beta/some reactivity present     Plan  Neuro check q4 and vitals q1  BP goal 100-160, MAP > 65  Sinemet and Amantadine to promote awakening   STAT CT head for any acute neurological change   Off sedation  Oxycodone/Tylenol for pain PRN  Plan for long term vent rehab facility   Opthalmology following --> tegaderm  / erythromycin x eye coverage      CV   Diagnosis: Uncontrolled hypertension at presentation; now resolved   Data  HR: 71 (24 Oct 2024 18:00) (67 - 97)  BP: 126/86 (24 Oct 2024 18:00) (126/86 - 154/90)  BP(mean): 102 (24 Oct 2024 18:00) (100 - 118)  ECHO 9/30 hyperdynamic left ventricular function EF 70%  Plan  SBP goal 100-150, MAP > 65  Continue amlodipine 10 mg daily   (off hydralazine and labetalol)  Telemetry monitoring    PULM   Diagnosis: Intubated for airway protection in the setting of acute encephalopathy; persistent respiratory failure; S/P tracheostomy; VAP,  trach collar since 10/23 AM  Data  RR: 10 (24 Oct 2024 18:00) (7 - 20)  SpO2: 98% (24 Oct 2024 18:00) (98% - 100%)  Patient On (Oxygen Delivery Method): tracheostomy collar  O2 Flow (L/min): 15  O2 Concentration (%): 40  Mode: standby  Chest x-ray 10/17: Similar to prior exam 10/16/2024 with right lung base partial atelectasis. Nasogastric tube tip below hemidiaphragm. No pneumothorax. No acute infiltrate appearing. Possible minimal right pleural effusion. No left pleural effusion.  Chest x-ray 10/21:  Two views of chest obtained at bedside. NG tube in stomach. Small discoid atelectasis right perihilar region. No pleural effusion.  Cardia mediastinal silhouette unremarkable. Degenerative changes of spine.  IMPRESSION: Small atelectasis right perihilar region is unchanged.  ABG - ( 23 Oct 2024 23:00 )  pH, Arterial: 7.33  pH, Blood: x     /  pCO2: 43    /  pO2: 99    / HCO3: 23    / Base Excess: -3.2  /  SaO2: 98.5    Plan   Continue trach collar   Pulm toilet/chest PT q4/Nebsq12  pulse-oxymetry monitoring   atropine for oral secretion   monitor for suctioning requirement    RENAL   Diagnosis: ANIKA on CKD (unknown baseline) - - > stable; urinary retention requiring Gabriel placement; intermittent hyperkalemia   Data  10-24    138  |  105  |  42[H]  ----------------------------<  108[H]  4.8   |  24  |  2.82[H]    Ca    9.2      24 Oct 2024 05:29  Phos  5.0     10-24  Mg     2.3     10-24    I&O's Summary    23 Oct 2024 07:01  -  24 Oct 2024 07:00  --------------------------------------------------------  IN: 970 mL / OUT: 4400 mL / NET: -3430 mL    24 Oct 2024 07:01  -  24 Oct 2024 18:38  --------------------------------------------------------  IN: 420 mL / OUT: 1089 mL / NET: -669 mL    Plan   Euvolemia/normonatremia goal  KVO  Monitor for hyperkalemia/monitor I/o  Gabriel due to refractory urinary retention   Tamsulosin + Doxazosin   Renal following   Avoid nephrotoxic medications    ID  Diagnosis: Afebrile, normal white count; s/p Acinetobacter B. Vent Associated PNA  Data  T(C): 36.7 (24 Oct 2024 17:18), Max: 36.7 (24 Oct 2024 17:18)  WBC 6.23   Blood Cx 10/15: NGTD   Sputum Cx 10/16: Acinetobacter B.   Procalcitonin 10/16: 0.36 <-- 0.13   Plan   - S/p  7-day antibiotics Vanc and Zosyn ---> narrowed to Unasyn (10/16 - 10/23) for Acinetobacter B. PNA  - S/P Ancef  - monitor WBC and fever curve   - culture if spikes 38.3C    GI  Diagnosis: Dysphagia secondary to encephalopathy/hemorrhagic stroke s/p PEG placement (10/21 by Surgery)    Data  Last Bowel Movement: 24-Oct-2024 (10-24-24 @ 08:00)  Last Bowel Movement: 23-Oct-2024 (10-23-24 @ 21:00)  Last Bowel Movement: 23-Oct-2024 (10-23-24 @ 08:00)  Plan   S/p PEG 10/21  Enteral feeding via PEG   GI prophylaxis while intubated   rectal tube due diarrhea   Holding bowel regimen given diarrhea    HEME/ONC  Diagnosis: no active issues   Data                                 10.5   6.23  )-----------( 372      ( 24 Oct 2024 05:29 )             34.0       LE venous ultrasound 10/19: negative for DVT     Plan   Monitor H&H  Hbg goal > 7.0, PLT > 100  SDCs + heparin 5000 q8 for DVT prophylaxis    ENDO  Diagnosis: no active issues   Data  A1c: 5.3  CAPILLARY BLOOD GLUCOSE  POCT Blood Glucose.: 95 mg/dL (24 Oct 2024 16:18)  POCT Blood Glucose.: 103 mg/dL (24 Oct 2024 11:07)    Plan:   Goal euglycemia (-180)  Avoid hypoglycemic episodes      CODE STATUS: FULL CODE  Patient's son (Chauncey Reyes) updated at bedside with . He understands the current clinical condition of his father and would like for him to continue receiving full life sustaining measures.     DISPO:   NeuroICU  Tentative plan to transfer to [facility] / [facility] step down unit when able tolerate trach collar for 24-48 hours        Jazlyn Tomlin MD   Neurocritical Care Attending

## 2024-10-25 LAB
ANION GAP SERPL CALC-SCNC: 12 MMOL/L — SIGNIFICANT CHANGE UP (ref 5–17)
ANION GAP SERPL CALC-SCNC: 13 MMOL/L — SIGNIFICANT CHANGE UP (ref 5–17)
ANION GAP SERPL CALC-SCNC: 14 MMOL/L — SIGNIFICANT CHANGE UP (ref 5–17)
BASE EXCESS BLDA CALC-SCNC: -3 MMOL/L — LOW (ref -2–3)
BUN SERPL-MCNC: 47 MG/DL — HIGH (ref 7–23)
BUN SERPL-MCNC: 52 MG/DL — HIGH (ref 7–23)
BUN SERPL-MCNC: 53 MG/DL — HIGH (ref 7–23)
CALCIUM SERPL-MCNC: 9.2 MG/DL — SIGNIFICANT CHANGE UP (ref 8.4–10.5)
CALCIUM SERPL-MCNC: 9.4 MG/DL — SIGNIFICANT CHANGE UP (ref 8.4–10.5)
CALCIUM SERPL-MCNC: 9.5 MG/DL — SIGNIFICANT CHANGE UP (ref 8.4–10.5)
CHLORIDE SERPL-SCNC: 107 MMOL/L — SIGNIFICANT CHANGE UP (ref 96–108)
CHLORIDE SERPL-SCNC: 107 MMOL/L — SIGNIFICANT CHANGE UP (ref 96–108)
CHLORIDE SERPL-SCNC: 111 MMOL/L — HIGH (ref 96–108)
CO2 BLDA-SCNC: 24 MMOL/L — SIGNIFICANT CHANGE UP (ref 19–24)
CO2 SERPL-SCNC: 20 MMOL/L — LOW (ref 22–31)
CO2 SERPL-SCNC: 20 MMOL/L — LOW (ref 22–31)
CO2 SERPL-SCNC: 23 MMOL/L — SIGNIFICANT CHANGE UP (ref 22–31)
CREAT SERPL-MCNC: 3.21 MG/DL — HIGH (ref 0.5–1.3)
CREAT SERPL-MCNC: 3.27 MG/DL — HIGH (ref 0.5–1.3)
CREAT SERPL-MCNC: 3.38 MG/DL — HIGH (ref 0.5–1.3)
EGFR: 22 ML/MIN/1.73M2 — LOW
EGFR: 22 ML/MIN/1.73M2 — LOW
EGFR: 23 ML/MIN/1.73M2 — LOW
GAS PNL BLDA: SIGNIFICANT CHANGE UP
GLUCOSE SERPL-MCNC: 105 MG/DL — HIGH (ref 70–99)
GLUCOSE SERPL-MCNC: 108 MG/DL — HIGH (ref 70–99)
GLUCOSE SERPL-MCNC: 93 MG/DL — SIGNIFICANT CHANGE UP (ref 70–99)
HCO3 BLDA-SCNC: 23 MMOL/L — SIGNIFICANT CHANGE UP (ref 21–28)
HCT VFR BLD CALC: 34.8 % — LOW (ref 39–50)
HGB BLD-MCNC: 11 G/DL — LOW (ref 13–17)
MAGNESIUM SERPL-MCNC: 2.5 MG/DL — SIGNIFICANT CHANGE UP (ref 1.6–2.6)
MCHC RBC-ENTMCNC: 28.3 PG — SIGNIFICANT CHANGE UP (ref 27–34)
MCHC RBC-ENTMCNC: 31.6 GM/DL — LOW (ref 32–36)
MCV RBC AUTO: 89.5 FL — SIGNIFICANT CHANGE UP (ref 80–100)
NRBC # BLD: 0 /100 WBCS — SIGNIFICANT CHANGE UP (ref 0–0)
NRBC BLD-RTO: 0 /100 WBCS — SIGNIFICANT CHANGE UP (ref 0–0)
PCO2 BLDA: 42 MMHG — SIGNIFICANT CHANGE UP (ref 35–48)
PH BLDA: 7.34 — LOW (ref 7.35–7.45)
PHOSPHATE SERPL-MCNC: 5 MG/DL — HIGH (ref 2.5–4.5)
PLATELET # BLD AUTO: 368 K/UL — SIGNIFICANT CHANGE UP (ref 150–400)
PO2 BLDA: 111 MMHG — HIGH (ref 83–108)
POTASSIUM SERPL-MCNC: 5 MMOL/L — SIGNIFICANT CHANGE UP (ref 3.5–5.3)
POTASSIUM SERPL-MCNC: 5.3 MMOL/L — SIGNIFICANT CHANGE UP (ref 3.5–5.3)
POTASSIUM SERPL-MCNC: 5.4 MMOL/L — HIGH (ref 3.5–5.3)
POTASSIUM SERPL-SCNC: 5 MMOL/L — SIGNIFICANT CHANGE UP (ref 3.5–5.3)
POTASSIUM SERPL-SCNC: 5.3 MMOL/L — SIGNIFICANT CHANGE UP (ref 3.5–5.3)
POTASSIUM SERPL-SCNC: 5.4 MMOL/L — HIGH (ref 3.5–5.3)
RBC # BLD: 3.89 M/UL — LOW (ref 4.2–5.8)
RBC # FLD: 12.3 % — SIGNIFICANT CHANGE UP (ref 10.3–14.5)
SAO2 % BLDA: 98.6 % — HIGH (ref 94–98)
SODIUM SERPL-SCNC: 141 MMOL/L — SIGNIFICANT CHANGE UP (ref 135–145)
SODIUM SERPL-SCNC: 142 MMOL/L — SIGNIFICANT CHANGE UP (ref 135–145)
SODIUM SERPL-SCNC: 144 MMOL/L — SIGNIFICANT CHANGE UP (ref 135–145)
WBC # BLD: 6.21 K/UL — SIGNIFICANT CHANGE UP (ref 3.8–10.5)
WBC # FLD AUTO: 6.21 K/UL — SIGNIFICANT CHANGE UP (ref 3.8–10.5)

## 2024-10-25 PROCEDURE — 99291 CRITICAL CARE FIRST HOUR: CPT

## 2024-10-25 PROCEDURE — 70450 CT HEAD/BRAIN W/O DYE: CPT | Mod: 26

## 2024-10-25 PROCEDURE — 71045 X-RAY EXAM CHEST 1 VIEW: CPT | Mod: 26

## 2024-10-25 PROCEDURE — 99232 SBSQ HOSP IP/OBS MODERATE 35: CPT

## 2024-10-25 RX ORDER — SODIUM CHLORIDE 9 G/1000ML
500 INJECTION, SOLUTION INTRAVENOUS ONCE
Refills: 0 | Status: COMPLETED | OUTPATIENT
Start: 2024-10-25 | End: 2024-10-25

## 2024-10-25 RX ORDER — SODIUM ZIRCONIUM CYCLOSILICATE 5 G/5G
5 POWDER, FOR SUSPENSION ORAL DAILY
Refills: 0 | Status: DISCONTINUED | OUTPATIENT
Start: 2024-10-25 | End: 2024-11-24

## 2024-10-25 RX ORDER — SODIUM CHLORIDE 9 G/1000ML
1000 INJECTION, SOLUTION INTRAVENOUS
Refills: 0 | Status: DISCONTINUED | OUTPATIENT
Start: 2024-10-25 | End: 2024-10-25

## 2024-10-25 RX ADMIN — SODIUM CHLORIDE 1000 MILLILITER(S): 9 INJECTION, SOLUTION INTRAVENOUS at 12:00

## 2024-10-25 RX ADMIN — Medication 1 APPLICATION(S): at 06:35

## 2024-10-25 RX ADMIN — HEPARIN SODIUM 5000 UNIT(S): 1000 INJECTION INTRAVENOUS; SUBCUTANEOUS at 06:31

## 2024-10-25 RX ADMIN — ACETYLCYSTEINE 4 MILLILITER(S): 200 INHALANT RESPIRATORY (INHALATION) at 06:49

## 2024-10-25 RX ADMIN — IPRATROPIUM BROMIDE AND ALBUTEROL SULFATE 3 MILLILITER(S): .5; 2.5 SOLUTION RESPIRATORY (INHALATION) at 17:08

## 2024-10-25 RX ADMIN — OFLOXACIN 1 DROP(S): 3 SOLUTION OPHTHALMIC at 00:09

## 2024-10-25 RX ADMIN — OFLOXACIN 1 DROP(S): 3 SOLUTION OPHTHALMIC at 18:07

## 2024-10-25 RX ADMIN — HEPARIN SODIUM 5000 UNIT(S): 1000 INJECTION INTRAVENOUS; SUBCUTANEOUS at 14:19

## 2024-10-25 RX ADMIN — Medication 2 DROP(S): at 03:00

## 2024-10-25 RX ADMIN — Medication 1 DROP(S): at 09:58

## 2024-10-25 RX ADMIN — Medication 1 TABLET(S): at 07:49

## 2024-10-25 RX ADMIN — ERYTHROMYCIN 1 APPLICATION(S): 5 OINTMENT OPHTHALMIC at 06:35

## 2024-10-25 RX ADMIN — Medication 1 DROP(S): at 18:25

## 2024-10-25 RX ADMIN — Medication 667 MILLIGRAM(S): at 12:01

## 2024-10-25 RX ADMIN — Medication 1 DROP(S): at 11:21

## 2024-10-25 RX ADMIN — Medication 1 APPLICATION(S): at 06:34

## 2024-10-25 RX ADMIN — Medication 2 DROP(S): at 12:05

## 2024-10-25 RX ADMIN — Medication 1 DROP(S): at 22:17

## 2024-10-25 RX ADMIN — OFLOXACIN 1 DROP(S): 3 SOLUTION OPHTHALMIC at 12:06

## 2024-10-25 RX ADMIN — ERYTHROMYCIN 1 APPLICATION(S): 5 OINTMENT OPHTHALMIC at 22:08

## 2024-10-25 RX ADMIN — ERYTHROMYCIN 1 APPLICATION(S): 5 OINTMENT OPHTHALMIC at 14:17

## 2024-10-25 RX ADMIN — OFLOXACIN 1 DROP(S): 3 SOLUTION OPHTHALMIC at 06:35

## 2024-10-25 RX ADMIN — DOXAZOSIN MESYLATE 8 MILLIGRAM(S): 8 TABLET ORAL at 22:04

## 2024-10-25 RX ADMIN — Medication 1 DROP(S): at 06:35

## 2024-10-25 RX ADMIN — ACETYLCYSTEINE 4 MILLILITER(S): 200 INHALANT RESPIRATORY (INHALATION) at 17:09

## 2024-10-25 RX ADMIN — ERYTHROMYCIN 1 APPLICATION(S): 5 OINTMENT OPHTHALMIC at 02:31

## 2024-10-25 RX ADMIN — IPRATROPIUM BROMIDE AND ALBUTEROL SULFATE 3 MILLILITER(S): .5; 2.5 SOLUTION RESPIRATORY (INHALATION) at 06:50

## 2024-10-25 RX ADMIN — Medication 667 MILLIGRAM(S): at 07:50

## 2024-10-25 RX ADMIN — ERYTHROMYCIN 1 APPLICATION(S): 5 OINTMENT OPHTHALMIC at 09:58

## 2024-10-25 RX ADMIN — Medication 1 DROP(S): at 16:25

## 2024-10-25 RX ADMIN — Medication 1 DROP(S): at 12:20

## 2024-10-25 RX ADMIN — HEPARIN SODIUM 5000 UNIT(S): 1000 INJECTION INTRAVENOUS; SUBCUTANEOUS at 22:05

## 2024-10-25 RX ADMIN — AMLODIPINE BESYLATE 10 MILLIGRAM(S): 10 TABLET ORAL at 06:31

## 2024-10-25 RX ADMIN — ERYTHROMYCIN 1 APPLICATION(S): 5 OINTMENT OPHTHALMIC at 18:08

## 2024-10-25 RX ADMIN — Medication 2 DROP(S): at 18:07

## 2024-10-25 RX ADMIN — Medication 30 MILLILITER(S): at 20:55

## 2024-10-25 RX ADMIN — Medication 667 MILLIGRAM(S): at 18:07

## 2024-10-25 RX ADMIN — Medication 1 APPLICATION(S): at 20:02

## 2024-10-25 NOTE — PROGRESS NOTE ADULT - SUBJECTIVE AND OBJECTIVE BOX
Neurocritical Care Attending Note     GARETT REYES   MRN-[id number]  Summary:  44y/M  unknown past medical history (reported stroke and MI by coworkers) presented to [facility] with AMS, Pt was working at [employer] when he was found down by coworkers. EMS called and pt brought to [facility] ED. Intubated, sedated, started on cardene for SBPs in 200s. CT head showed brain stem bleed. Transferred to NSICU for further management.  (30 Sep 2024 12:55)    Hospital Course/Interval Events  9/30: transferred from [facility]. A line placed. Versed dc'd. Roomate Prasanth at bedside, states pt has family in [place], cannot confirm medications or PMH other than stroke and MI. 250cc bolus 3% given. LR switched to NS. hydralazine 25q8 started, 3% started, switched propofol to precedex   10/1: stability CTH done. Added labetalol, started TF. Palliative consulted. ethics consulted to determine surrogate. febrile 103, pan cx sent  10/2: BD 2, GEORGE overnight. TF resumed. Desatt'd to 80s, FiO2 inc. to 50. Fentanyl given, ABG, CXR ordered. Maxxed on precedex, started on propofol for DARIEN -4 - -5. Precedex dc'd. Duonebs, mucomyst, hypertonic added. 3% dc'd. Cardene dc'd. Start vanc/CTX. Increased labetalol 200q8. MRSA negative, dc'd vanc. ETT pulled back 2cm x 2, good positioning after confirmatory chest xray. Ethics attempting to establish HCP with family. Na 159, starting FW 250q6 for range 150-155.   10/3: BD3, GEORGE o/n, neuro stable. Na elevating, FW increased to 300q6. Dc'd bowel reg for diarrhea. vEEG started. SQH 5000q8 tonight.   10/4: BD 4, albumn bolus, incr. LR to 80 2/2 incr. in Cr, LR to 100cc/hr for uptrending Cr. Started 7% hypersal for 48hrs and SL atropine for inline/oral thick secretions. Dc'd CTX and started ancef for MSSA in the sputum. Nephrology consulted for CKD, f/u recs. SBP 170s, given hydralazine 10mg IVP.   10/5: BD5, o/n 10mg IVP hydralazine given for SBP 170s and started on hydralazine 25q8 via OGT. 10mg IV push labetalol for SBP > 160s. RT placed for diarrhea.   10/6: BD6, o/n FW increased to 350q4 per nephrology recs. IV tylenol for temp 100.6, SBp 160s presumed uncomfortable.   10/7: BD7, overnight pancultured for temp 101.8F.   10/8: BD8. GEORGE. Cr bumped. decreased LR to 75cc/hr. Adding simethicone ATC. incr hydralazine 50mgTID. Incr labetalol 300mgTID. Na 145, decreased FWF to 250q6. Start precedex. FENa consistent with intrinsic kidney injury. Pend repeat renal US. Retaining up to 1.3L, bladder scans q6, straight cath PRN  10/9: BD 9. GEORGE overnight. Neuro stable. abd xray for distention w non-specific gas pattern, OGT to LIWS for morning. duonebs/mucomyst to q8 for improving secretions. Changed tube feeds to Jevity 1.5 20cc/hr, low rate due to abdominal distention, nepro dense and more difficult to digest. Tolerating CPAP, confirmed by ABG.   10/10: BD 10. GEORGE overnight. Neuro stable. (+) gabriel for urinary retention on bladder scan. inc TF to goal rate of 40cc/hr. family leaning toward pursuing trach/PEG. 1/2 amp for FS 81.   10/11: BD 11. GEORGE overnight. Neuro stable. Trach/PEG consults placed.   10/12: BD 12. GEORGE overnight. Neuro stable. MRI brain complete.   10/13: BD 13. Increase flomax. Hold SQH after PM dose for trach tm. IVL.   10/14: BD 14. GEORGE overnight, remains on AC/VC. Gabriel placed for urinary retention. Dc'd free water.  S/p trach with pulm. NGT placed and CXR confirmed in good position.   10/15: BD 15, GEORGE ovn. resumed feeds. spiked 101, pan cx sent.   10/16: BD 16. GEORGE ovn. Lokelma 5mg for K+ 5.4. Started vanc q 24/zosyn for empiric PNA coverage, IVF to 100/hr. PEG held for fever.   10/17: Na low, ordered serum osm and urine osm for am. Started sinemet for neurostimulation. Increased cardura to 0.8. Started FW 100q4, dc'd IVF.   10/18: BD 18, GEORGE overnight, neuro stable. Amantadine added for neurostim.   10/19: BD 19, GEORGE ovn. cardura 2mg added for retention. labetalol decreased 200q8, hydralazine decreased 25q8. Gabriel replaced.   10/20: BD20, GEORGE overnight. NGT dislodged, replaced.   10/21: BD 21. POD0 PEG placement with Gen surg. decr labetolol to 50q8, incr. cardura to 0.4, started lokelma and phoslo, dc gabriel POD0 PEG placement with Gen surg.  10/22: BD 22. Plan to start TF today via PEG. dc labetalol, Following ophtho recs. Increased apnea settings - found to be in cheyne-moe respiration. CPAP 5/5.  10/23: BD 23. hydralazine d/c'd, trach collar trial today. Rectal tube placed at 6am.  10/24: BD24, o/n lokelma held due to diarrhea. Free water 100q6 resumed. dc'd tamsulosin, amantadine. Incr'd cardura to 8mg qhs. Dc'd FW. Switched jevity to nepro. gabriel placed for high urine output. Started SL atropine for oral secretions. Dc'd free water.   10/25: BD25, o/n decreased suctioning requirements to > q4hrs, GEORGE. Cr improving, cont phoslo, lokelma held at this time. Gabriel placed yest, cont. Tolerating trach collar. Given 500cc plasmalyte bolus for ANIKA. Dc'd sinemet.     MEDICATIONS  (STANDING):  acetylcysteine 10%  Inhalation 4 milliLiter(s) Inhalation every 12 hours  albuterol/ipratropium for Nebulization 3 milliLiter(s) Nebulizer every 12 hours  amLODIPine   Tablet 10 milliGRAM(s) Oral daily  artificial tears (preservative free) Ophthalmic Solution 1 Drop(s) Right EYE every 2 hours  atropine 1% Ophthalmic Solution for SubLingual Use 2 Drop(s) SubLingual every 8 hours  calcium acetate 667 milliGRAM(s) Oral three times a day with meals  chlorhexidine 2% Cloths 1 Application(s) Topical <User Schedule>  doxazosin 8 milliGRAM(s) Oral at bedtime  erythromycin   Ointment 1 Application(s) Right EYE every 4 hours  heparin   Injectable 5000 Unit(s) SubCutaneous every 8 hours  multiple electrolytes Injection Type 1 1000 milliLiter(s) (80 mL/Hr) IV Continuous <Continuous>  ofloxacin 0.3% Solution 1 Drop(s) Right EYE four times a day  petrolatum Ophthalmic Ointment 1 Application(s) Both EYES two times a day    MEDICATIONS  (PRN):  acetaminophen   Oral Liquid .. 650 milliGRAM(s) Oral every 6 hours PRN Temp greater or equal to 38C (100.4F), Mild Pain (1 - 3)  oxyCODONE    Solution 5 milliGRAM(s) Oral every 4 hours PRN Moderate Pain (4 - 6)        Physical Exam  ICU Vital Signs Last 24 Hrs  T(C): 37.5 (25 Oct 2024 17:45), Max: 37.9 (25 Oct 2024 05:26)  T(F): 99.5 (25 Oct 2024 17:45), Max: 100.2 (25 Oct 2024 05:26)  HR: 83 (25 Oct 2024 18:00) (74 - 86)  BP: 129/92 (25 Oct 2024 18:00) (117/82 - 140/84)  BP(mean): 104 (25 Oct 2024 18:00) (95 - 109)  RR: 14 (25 Oct 2024 18:00) (9 - 16)  SpO2: 98% (25 Oct 2024 18:00) (97% - 100%)    O2 Parameters below as of 25 Oct 2024 18:00  Patient On (Oxygen Delivery Method): tracheostomy collar  O2 Flow (L/min): 15  O2 Concentration (%): 40    Mode: standby        Gen: no apparent distress  HEENT right eye conjunctival injection and corneal abrasion, neck supple, tracheostomy in place   RESP: No respiratory distress, CTA b/l, no WRR  CV: RRR, +S1S2  GI: Soft, NT, ND rectal tube in place  Extr: non edematous, peripheral pulses present symmetric   NEURO:   Mental Status: sleeping, not opening to verbal stimulus, trace opening to noxious, intermittent downward gaze/ocular bobbing, can track vertically, appears to have impaired horizontal bilateral eyes movement - not following commands   CNs: Disconjugated gaze, left eye some abduction and adduction movements noted, spontaneous vs. purposeful downward intermittent eye movement, pupils equally round and reactive to light right larger than left, corneal + L, trace on right,  cough ++, breathing over vent  Motor: head/neck movement towards noxious, extensor posturing to noxious at the bilateral upper extremities, triple flexion at the bilateral lower extremities     ASSESSMENT/PLAN  45 y/o PMH ?stroke/MI present to [facility] after collapsing at work. Decorticate posturing, vomiting, intubated for airway protection. 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 Transferred to [facility] for further management. s/p trach 10/14. Course complicated by persistent encephalopathy/wakeful unresponsiveness, respiratory failure, VAP.     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   Data   - CTA head and neck 09/30: Re-demonstration of pontine hemorrhage with associated edema and mass effect upon the fourth ventricle and cerebral aqueduct. No underlying no malformation is identified. No increase in ventricular size since the   prior study.  - CT head 10/03: Again noted is a parenchymal hematoma in the nabil, with associated  expansion and surrounding vasogenic edema. There is mild spillage into the subarachnoid spaces at the cistern and within sulci in the right frontal lobe. All of these findings appear stable, without interval rebleeding. There is a chronic hemorrhagic infarct in the left external capsule/corona radiata. There is a small chronic white matter infarct vs small vessel disease in the right corona radiata. Moderate generalized cerebral volume loss, with distention of the sulci and concomitant ex-vacuo ventricular dilatation. Mild nonspecific low attenuation in the periventricular and subcortical white matter. No midline shift or herniation. No CT evidence of acute territorial infarction, although MRI with DWI would be more sensitive. Limited views of the sinuses and mastoids show mild mucosal thickening without air-fluid levels, likely chronic. An ETT is noted in place. Limited views of the orbits and visualized soft tissues of the neck, face, scalp, skull base, and calvarium are otherwise unremarkable. IMPRESSION: No significant change, without interval rebleeding.  - MRI brain 10/12: Similar-appearing evolving acute parenchymal hemorrhage within the nabil with surrounding mass effect and edema. No underlying enhancement. No new areas of acute intracranial hemorrhage. Wedge-shaped acute/subacute infarct within the right lateral cerebellar hemisphere. Unchanged encephalomalacia and gliosis within the left external capsule related to remote hemorrhage in this location.  - vEEG 10/17: with predominant delta slowing with superimposed geo and beta/some reactivity present     Plan  Neuro check q4 and vitals q1  BP goal 100-160, MAP > 65  Sinemet d/c  Continue Amantadine to promote awakening   STAT CT head for any acute neurological change   Off sedation  Oxycodone/Tylenol for pain PRN  Plan for long term vent rehab facility   Opthalmology following --> tegaderm  / erythromycin x eye coverage      CV   Diagnosis: Uncontrolled hypertension at presentation; now resolved   Data  HR: 83 (25 Oct 2024 18:00) (74 - 86)  BP: 129/92 (25 Oct 2024 18:00) (117/82 - 140/84)  BP(mean): 104 (25 Oct 2024 18:00) (95 - 109)  ECHO 9/30 hyperdynamic left ventricular function EF 70%  Plan  SBP goal 100-150, MAP > 65  Continue amlodipine 10 mg daily   (off hydralazine and labetalol)  Telemetry monitoring    PULM   Diagnosis: Intubated for airway protection in the setting of acute encephalopathy; persistent respiratory failure; S/P tracheostomy; VAP,  trach collar since 10/23 AM  Data  RR: 14 (25 Oct 2024 18:00) (9 - 16)  SpO2: 98% (25 Oct 2024 18:00) (97% - 100%)  O2 Parameters below as of 25 Oct 2024 18:00  Patient On (Oxygen Delivery Method): tracheostomy collar  Chest x-ray 10/17: Similar to prior exam 10/16/2024 with right lung base partial atelectasis. Nasogastric tube tip below hemidiaphragm. No pneumothorax. No acute infiltrate appearing. Possible minimal right pleural effusion. No left pleural effusion.  Chest x-ray 10/21:  Two views of chest obtained at bedside. NG tube in stomach. Small discoid atelectasis right perihilar region. No pleural effusion.  Cardia mediastinal silhouette unremarkable. Degenerative changes of spine.  IMPRESSION: Small atelectasis right perihilar region is unchanged.  ABG - ( 23 Oct 2024 23:00 )  pH, Arterial: 7.33  pH, Blood: x     /  pCO2: 43    /  pO2: 99    / HCO3: 23    / Base Excess: -3.2  /  SaO2: 98.5    Plan   Continue trach collar   Pulm toilet/chest PT q4/Nebsq12  pulse-oxymetry monitoring   atropine for oral secretion   monitor for suctioning requirement    RENAL   Diagnosis: ANIKA on CKD (unknown baseline) - - > stable; urinary retention requiring Gabriel placement; intermittent hyperkalemia   Data  10-25    142  |  107  |  52[H]  ----------------------------<  108[H]  5.3   |  23  |  3.38[H]    Ca    9.5      25 Oct 2024 15:32  Phos  5.0     10-25  Mg     2.5     10-25    I&O's Summary    24 Oct 2024 07:01  -  25 Oct 2024 07:00  --------------------------------------------------------  IN: 860 mL / OUT: 2134 mL / NET: -1274 mL    25 Oct 2024 07:01  -  25 Oct 2024 19:54  --------------------------------------------------------  IN: 1155 mL / OUT: 700 mL / NET: 455 mL    Plan   Euvolemia/normonatremia goal  KVO  Monitor for hyperkalemia/monitor I&O  Gabriel due to refractory urinary retention   Tamsulosin + Doxazosin   Avoid nephrotoxic medications    ID  Diagnosis: Afebrile, normal white count; s/p Acinetobacter B. Vent Associated PNA  Data  WBC 6.21 < 6.23   Blood Cx 10/15: NGTD   Sputum Cx 10/16: Acinetobacter B.   Procalcitonin 10/16: 0.36 <-- 0.13   Plan   - S/p  7-day antibiotics Vanc and Zosyn ---> narrowed to Unasyn (10/16 - 10/23) for Acinetobacter B. PNA  - S/P Ancef  - monitor WBC and fever curve   - culture if spikes 38.3C    GI  Diagnosis: Dysphagia secondary to encephalopathy/hemorrhagic stroke s/p PEG placement (10/21 by Surgery)    Data  Last Bowel Movement: 24-Oct-2024 (10-24-24 @ 21:00)  Last Bowel Movement: 24-Oct-2024 (10-24-24 @ 08:00)  Plan   S/p PEG 10/21  Enteral feeding via PEG at goal  GI prophylaxis while intubated   rectal tube due diarrhea   Holding bowel regimen given diarrhea    HEME/ONC  Diagnosis: no active issues   Data                                         11.0   6.21  )-----------( 368      ( 25 Oct 2024 06:15 )             34.8     LE venous ultrasound 10/19: negative for DVT     Plan   Monitor H&H  Hbg goal > 7.0, PLT > 100  SDCs + heparin 5000 q8 for DVT prophylaxis    ENDO  Diagnosis: no active issues   Data  A1c: 5.3  Plan:   Goal euglycemia (-180)  Avoid hypoglycemic episodes      CODE STATUS: FULL CODE  Patient's son (Chauncey Reyes) updated at bedside with . He understands the current clinical condition of his father and would like for him to continue receiving full life sustaining measures.     DISPO:   NeuroICU  Transfer to [facility] Stepdown pending bed availability         Jazlyn Tomlin MD   Neurocritical Care Attending                Neurocritical Care Attending Note     GARETT REYES   MRN-[id number]  Summary:  44y/M  unknown past medical history (reported stroke and MI by coworkers) presented to [facility] with AMS, Pt was working at [employer] when he was found down by coworkers. EMS called and pt brought to [facility] ED. Intubated, sedated, started on cardene for SBPs in 200s. CT head showed brain stem bleed. Transferred to NSICU for further management.  (30 Sep 2024 12:55)    Hospital Course/Interval Events  9/30: transferred from [facility]. A line placed. Versed dc'd. Roomate Prasanth at bedside, states pt has family in [place], cannot confirm medications or PMH other than stroke and MI. 250cc bolus 3% given. LR switched to NS. hydralazine 25q8 started, 3% started, switched propofol to precedex   10/1: stability CTH done. Added labetalol, started TF. Palliative consulted. ethics consulted to determine surrogate. febrile 103, pan cx sent  10/2: BD 2, GEORGE overnight. TF resumed. Desatt'd to 80s, FiO2 inc. to 50. Fentanyl given, ABG, CXR ordered. Maxxed on precedex, started on propofol for DARIEN -4 - -5. Precedex dc'd. Duonebs, mucomyst, hypertonic added. 3% dc'd. Cardene dc'd. Start vanc/CTX. Increased labetalol 200q8. MRSA negative, dc'd vanc. ETT pulled back 2cm x 2, good positioning after confirmatory chest xray. Ethics attempting to establish HCP with family. Na 159, starting FW 250q6 for range 150-155.   10/3: BD3, GEORGE o/n, neuro stable. Na elevating, FW increased to 300q6. Dc'd bowel reg for diarrhea. vEEG started. SQH 5000q8 tonight.   10/4: BD 4, albumn bolus, incr. LR to 80 2/2 incr. in Cr, LR to 100cc/hr for uptrending Cr. Started 7% hypersal for 48hrs and SL atropine for inline/oral thick secretions. Dc'd CTX and started ancef for MSSA in the sputum. Nephrology consulted for CKD, f/u recs. SBP 170s, given hydralazine 10mg IVP.   10/5: BD5, o/n 10mg IVP hydralazine given for SBP 170s and started on hydralazine 25q8 via OGT. 10mg IV push labetalol for SBP > 160s. RT placed for diarrhea.   10/6: BD6, o/n FW increased to 350q4 per nephrology recs. IV tylenol for temp 100.6, SBp 160s presumed uncomfortable.   10/7: BD7, overnight pancultured for temp 101.8F.   10/8: BD8. GEORGE. Cr bumped. decreased LR to 75cc/hr. Adding simethicone ATC. incr hydralazine 50mgTID. Incr labetalol 300mgTID. Na 145, decreased FWF to 250q6. Start precedex. FENa consistent with intrinsic kidney injury. Pend repeat renal US. Retaining up to 1.3L, bladder scans q6, straight cath PRN  10/9: BD 9. GEORGE overnight. Neuro stable. abd xray for distention w non-specific gas pattern, OGT to LIWS for morning. duonebs/mucomyst to q8 for improving secretions. Changed tube feeds to Jevity 1.5 20cc/hr, low rate due to abdominal distention, nepro dense and more difficult to digest. Tolerating CPAP, confirmed by ABG.   10/10: BD 10. GEORGE overnight. Neuro stable. (+) gabriel for urinary retention on bladder scan. inc TF to goal rate of 40cc/hr. family leaning toward pursuing trach/PEG. 1/2 amp for FS 81.   10/11: BD 11. GEORGE overnight. Neuro stable. Trach/PEG consults placed.   10/12: BD 12. GEORGE overnight. Neuro stable. MRI brain complete.   10/13: BD 13. Increase flomax. Hold SQH after PM dose for trach tm. IVL.   10/14: BD 14. GEORGE overnight, remains on AC/VC. Gabriel placed for urinary retention. Dc'd free water.  S/p trach with pulm. NGT placed and CXR confirmed in good position.   10/15: BD 15, GEORGE ovn. resumed feeds. spiked 101, pan cx sent.   10/16: BD 16. GEORGE ovn. Lokelma 5mg for K+ 5.4. Started vanc q 24/zosyn for empiric PNA coverage, IVF to 100/hr. PEG held for fever.   10/17: Na low, ordered serum osm and urine osm for am. Started sinemet for neurostimulation. Increased cardura to 0.8. Started FW 100q4, dc'd IVF.   10/18: BD 18, GEORGE overnight, neuro stable. Amantadine added for neurostim.   10/19: BD 19, GEORGE ovn. cardura 2mg added for retention. labetalol decreased 200q8, hydralazine decreased 25q8. Gabriel replaced.   10/20: BD20, GEORGE overnight. NGT dislodged, replaced.   10/21: BD 21. POD0 PEG placement with Gen surg. decr labetolol to 50q8, incr. cardura to 0.4, started lokelma and phoslo, dc gabriel POD0 PEG placement with Gen surg.  10/22: BD 22. Plan to start TF today via PEG. dc labetalol, Following ophtho recs. Increased apnea settings - found to be in cheyne-moe respiration. CPAP 5/5.  10/23: BD 23. hydralazine d/c'd, trach collar trial today. Rectal tube placed at 6am.  10/24: BD24, o/n lokelma held due to diarrhea. Free water 100q6 resumed. dc'd tamsulosin, amantadine. Incr'd cardura to 8mg qhs. Dc'd FW. Switched jevity to nepro. gabriel placed for high urine output. Started SL atropine for oral secretions. Dc'd free water.   10/25: BD25, o/n decreased suctioning requirements to > q4hrs, GEORGE. Cr improving, cont phoslo, lokelma held at this time. Gabriel placed yest, cont. Tolerating trach collar. Given 500cc plasmalyte bolus for ANIKA. Dc'd sinemet.   On neurologic assessment around 8:45pm, bedside nurse noted patient's right pupil to be 6mm and sluggish on pupillometer, compared to left pupil 1.5mm briskly reactive. Rest of patient's neurologic exam at this time as follows: RU/RL some minimal spontaneous movement, o/w RU 2/5, RL triple flex to noxious, DARRELL w/d and LL TF. Patient urgently setup on portable monitor and taken to CT urgently, given 23.4% NaCl bullet prior to scan. ABG performed stable. CTH shows improvement in pontine hemorrhage, stable size of ventricles, some pontine hypodensity, on new area of hemorrhage.     MEDICATIONS  (STANDING):  acetylcysteine 10%  Inhalation 4 milliLiter(s) Inhalation every 12 hours  albuterol/ipratropium for Nebulization 3 milliLiter(s) Nebulizer every 12 hours  amLODIPine   Tablet 10 milliGRAM(s) Oral daily  artificial tears (preservative free) Ophthalmic Solution 1 Drop(s) Right EYE every 2 hours  atropine 1% Ophthalmic Solution for SubLingual Use 2 Drop(s) SubLingual every 8 hours  calcium acetate 667 milliGRAM(s) Oral three times a day with meals  chlorhexidine 2% Cloths 1 Application(s) Topical <User Schedule>  doxazosin 8 milliGRAM(s) Oral at bedtime  erythromycin   Ointment 1 Application(s) Right EYE every 4 hours  heparin   Injectable 5000 Unit(s) SubCutaneous every 8 hours  multiple electrolytes Injection Type 1 1000 milliLiter(s) (80 mL/Hr) IV Continuous <Continuous>  ofloxacin 0.3% Solution 1 Drop(s) Right EYE four times a day  petrolatum Ophthalmic Ointment 1 Application(s) Both EYES two times a day    MEDICATIONS  (PRN):  acetaminophen   Oral Liquid .. 650 milliGRAM(s) Oral every 6 hours PRN Temp greater or equal to 38C (100.4F), Mild Pain (1 - 3)  oxyCODONE    Solution 5 milliGRAM(s) Oral every 4 hours PRN Moderate Pain (4 - 6)        Physical Exam  ICU Vital Signs Last 24 Hrs  T(C): 37.5 (25 Oct 2024 17:45), Max: 37.9 (25 Oct 2024 05:26)  T(F): 99.5 (25 Oct 2024 17:45), Max: 100.2 (25 Oct 2024 05:26)  HR: 83 (25 Oct 2024 18:00) (74 - 86)  BP: 129/92 (25 Oct 2024 18:00) (117/82 - 140/84)  BP(mean): 104 (25 Oct 2024 18:00) (95 - 109)  RR: 14 (25 Oct 2024 18:00) (9 - 16)  SpO2: 98% (25 Oct 2024 18:00) (97% - 100%)    O2 Parameters below as of 25 Oct 2024 18:00  Patient On (Oxygen Delivery Method): tracheostomy collar  O2 Flow (L/min): 15  O2 Concentration (%): 40    Mode: standby      Gen: no apparent distress  HEENT right eye conjunctival injection and corneal abrasion, neck supple, tracheostomy in place   RESP: No respiratory distress, CTA b/l, no WRR  CV: RRR, +S1S2  GI: Soft, NT, ND rectal tube in place  Extr: non edematous, peripheral pulses present symmetric   NEURO:   Mental Status: sleeping, not opening to verbal stimulus, trace opening to noxious, intermittent downward gaze/ocular bobbing, can track vertically, appears to have impaired horizontal bilateral eyes movement - not following commands   CNs: Disconjugated gaze, left eye some abduction and adduction movements noted, spontaneous vs. purposeful downward intermittent eye movement, pupils right 6.5 mm sluggish reactive to light (NPI 0.5), left 3 mm reactive (NPI 4.5), corneal + L, trace on right,  cough ++, breathing over vent  Motor: head/neck movement towards noxious, extensor posturing to noxious at the bilateral upper extremities, triple flexion at the bilateral lower extremities     ASSESSMENT/PLAN  45 y/o PMH ?stroke/MI present to [facility] after collapsing at work. Decorticate posturing, vomiting, intubated for airway protection. 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 Transferred to [facility] for further management. s/p trach 10/14. Course complicated by persistent encephalopathy/wakeful unresponsiveness, respiratory failure, VAP.     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 on 10/25 PM with marked anisocoria (right 6 mm non-reactive)  Data     - CTA head and neck 09/30: Re-demonstration of pontine hemorrhage with associated edema and mass effect upon the fourth ventricle and cerebral aqueduct. No underlying no malformation is identified. No increase in ventricular size since the   prior study.  - CT head 10/03: Again noted is a parenchymal hematoma in the nabil, with associated  expansion and surrounding vasogenic edema. There is mild spillage into the subarachnoid spaces at the cistern and within sulci in the right frontal lobe. All of these findings appear stable, without interval -rebleeding. There is a chronic hemorrhagic infarct in the left external capsule/corona radiata. There is a small chronic white matter infarct vs small vessel disease in the right corona radiata. Moderate generalized cerebral volume loss, with distention of the sulci and concomitant ex-vacuo ventricular dilatation. Mild nonspecific low attenuation in the periventricular and subcortical white matter. No midline shift or herniation. No CT evidence of acute territorial infarction, although MRI with DWI would be more sensitive. Limited views of the sinuses and mastoids show mild mucosal thickening without air-fluid levels, likely chronic. An ETT is noted in place. Limited views of the orbits and visualized soft tissues of the neck, face, scalp, skull base, and calvarium are otherwise unremarkable. IMPRESSION: No significant change, without interval rebleeding.  - MRI brain 10/12: Similar-appearing evolving acute parenchymal hemorrhage within the nabil with surrounding mass effect and edema. No underlying enhancement. No new areas of acute intracranial hemorrhage. Wedge-shaped acute/subacute infarct within the right lateral cerebellar hemisphere. Unchanged encephalomalacia and gliosis within the left external capsule related to remote hemorrhage in this location.  - vEEG 10/17: with predominant delta slowing with superimposed geo and beta/some reactivity present     Plan  Neuro check q2 and vitals q1  Na goal > 140  BP goal 100-160, MAP > 65  Sinemet d/c  Continue Amantadine to promote awakening   STAT CT head for any acute neurological change   Off sedation  Oxycodone/Tylenol for pain PRN  Plan for long term vent rehab facility   Opthalmology following --> tegaderm  / erythromycin x eye coverage    CV   Diagnosis: Uncontrolled hypertension at presentation; now resolved   Data  HR: 83 (25 Oct 2024 18:00) (74 - 86)  BP: 129/92 (25 Oct 2024 18:00) (117/82 - 140/84)  BP(mean): 104 (25 Oct 2024 18:00) (95 - 109)  ECHO 9/30 hyperdynamic left ventricular function EF 70%  Plan  SBP goal 100-150, MAP > 65  Continue amlodipine 10 mg daily   (off hydralazine and labetalol)  Telemetry monitoring    PULM   Diagnosis: Intubated for airway protection in the setting of acute encephalopathy; persistent respiratory failure; S/P tracheostomy; VAP,  trach collar since 10/23 AM  Data  RR: 14 (25 Oct 2024 18:00) (9 - 16)  SpO2: 98% (25 Oct 2024 18:00) (97% - 100%)  O2 Parameters below as of 25 Oct 2024 18:00  Patient On (Oxygen Delivery Method): tracheostomy collar  Chest x-ray 10/17: Similar to prior exam 10/16/2024 with right lung base partial atelectasis. Nasogastric tube tip below hemidiaphragm. No pneumothorax. No acute infiltrate appearing. Possible minimal right pleural effusion. No left pleural effusion.  Chest x-ray 10/21:  Two views of chest obtained at bedside. NG tube in stomach. Small discoid atelectasis right perihilar region. No pleural effusion.  Cardia mediastinal silhouette unremarkable. Degenerative changes of spine.  IMPRESSION: Small atelectasis right perihilar region is unchanged.  ABG - ( 25 Oct 2024 20:59 )  pH, Arterial: 7.34  pH, Blood: x     /  pCO2: 42    /  pO2: 111   / HCO3: 23    / Base Excess: -3.0  /  SaO2: 98.6    Plan   Continue trach collar   Pulm toilet/chest PT q4/Nebsq12  pulse-oxymetry monitoring   atropine for oral secretion   monitor for suctioning requirement    RENAL   Diagnosis: ANIKA on CKD (unknown baseline) - - > stable; urinary retention requiring Gabriel placement; intermittent hyperkalemia   Data  10-25    142  |  107  |  52[H]  ----------------------------<  108[H]  5.3   |  23  |  3.38[H]    Ca    9.5      25 Oct 2024 15:32  Phos  5.0     10-25  Mg     2.5     10-25    I&O's Summary  24 Oct 2024 07:01  -  25 Oct 2024 07:00  --------------------------------------------------------  IN: 860 mL / OUT: 2134 mL / NET: -1274 mL  25 Oct 2024 07:01  -  25 Oct 2024 19:54  --------------------------------------------------------  IN: 1155 mL / OUT: 700 mL / NET: 455 mL    Plan   Na goal 140 given anisocoria   Euvolemia goal  Monitor for hyperkalemia/monitor I&O  LOKELMA for hyperkalemia    Gabriel due to refractory urinary retention   Tamsulosin + Doxazosin   Avoid nephrotoxic medications    ID  Diagnosis: Afebrile, normal white count; s/p Acinetobacter B. Vent Associated PNA  Data  WBC 6.21 < 6.23   Blood Cx 10/15: NGTD   Sputum Cx 10/16: Acinetobacter B.   Procalcitonin 10/16: 0.36 <-- 0.13   Plan   - S/p  7-day antibiotics Vanc and Zosyn ---> narrowed to Unasyn (10/16 - 10/23) for Acinetobacter B. 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 s/p PEG placement (10/21 by Surgery), diarrhea requiring rectal tube  Data  Last Bowel Movement: 24-Oct-2024 (10-24-24 @ 21:00)  Last Bowel Movement: 24-Oct-2024 (10-24-24 @ 08:00)  Plan   S/p PEG 10/21  Enteral feeding via PEG at goal  GI prophylaxis while intubated   rectal tube ---> d/c as output is decreasing   Holding bowel regimen given diarrhea    HEME/ONC  Diagnosis: no active issues   Data  Hgb 11.0,                    LE venous ultrasound 10/19: negative for DVT     Plan   Monitor H&H  Hbg goal > 7.0, PLT > 100  SDCs + heparin 5000 q8 for DVT prophylaxis    ENDO  Diagnosis: no active issues   Data  A1c: 5.3  Plan:   Goal euglycemia (-180)  Avoid hypoglycemic episodes      CODE STATUS: FULL CODE  Patient's son (Chauncey Reyes) updated at bedside with . He understands the current clinical condition of his father and would like for him to continue receiving full life sustaining measures.     DISPO:   NeuroICU  Transfer to Vent Stepdown pending bed availability         Jazlyn Tomlin MD   Neurocritical Care Attending

## 2024-10-25 NOTE — CHART NOTE - NSCHARTNOTEFT_GEN_A_CORE
Medical team requested updated tube feeding recommendations for pt, as medical team is concerned about renal function, changed tube feeding regimen to Nepro (currently at 45mL/hour x 18 hours + 2 LPS providing 810mL volume from tube feedings, 1634 calories, 96g protein, 589mL free water). Please see below:     Estimated energy needs:  Weight used for calculations	ideal body weight  Estimated Energy Needs Weight (lbs)	154 lb  Estimated Energy Needs Weight (kg)	69.8 kg  Estimated Energy Needs From (christiana/kg)	25  Estimated Energy Needs To (christiana/kg)	30  Estimated Energy Needs Calculated From (christiana/kg)	1745  Estimated Energy Needs Calculated To (christiana/kg)	2094  Weight used for calculations	ideal body weight  Estimated Protein Needs Weight (lbs)	154 lb  Estimated Protein Needs Weight (kg)	69.8 kg  Estimated Protein Needs From (g/kg)	1.2  Estimated Protein Needs To (g/kg)	1.4  Estimated Protein Needs Calculated From (g/kg)	83.76  Estimated Protein Needs Calculated To (g/kg)	97.72  Estimated Fluid Needs Weight (lbs)	154 lb  Estimated Fluid Needs Weight (kg)	69.8 kg  Estimated Fluid Needs From (ml/kg)	25  Estimated Fluid Needs To (ml/kg)	30  Estimated Fluid Needs Calculated From (ml/kg)	1745  Estimated Fluid Needs Calculated To (ml/kg)	2094  Other Calculations	Pt is >100% ideal body weight in the ICU, thus ideal body weight used for all calculations. Needs adjusted for age, clinical course, vented, ICU/critical care status.      Nutrition Recommendations:   1. NPO with tube feedings:   **consider the following: Nepro 1.8 via PEG, start at 20mL/hour, increase by 10mL/hour every 4-6 hours to GOAL of 60mL/hour x 18 hours, to provide 1080mL total volume from tube feeding, 1912 calories, 87g protein, 785mL free water; this meets ~27 calories/kg ideal body weight, ~22.4 nonprotein calories/kg ideal body weight, 1.25g protein/kg ideal body weight (70kg); consider 190mL every 4 hours  **continue Banatrol PRN to promote fecal bulk**  *Maintain aspiration precautions at all times; monitor for signs/symptoms of intolerance*  2. Monitor weights (weekly), GI function, skin integrity, chemistry/labs  **electrolytes, BMP, glucose, CBC per MD discretion *renal indices**  3. Pain and bowel regimen per medical team  4. Align nutrition with goals of care at all times  *dietitian has communicated with medical team*    RD to remain available.     Jessica Zamarripa, NICHOLEN, CDN, ext [phone]

## 2024-10-25 NOTE — PROGRESS NOTE ADULT - ASSESSMENT
44y/M with  large pontine hemorrhage, SAH, brain edema; ?locked-in  CVA  acute MI  acute on chronic CKD, hydronephrosis    PLAN:   NEURO: neurochecks q4h, VSq2 PRN pain meds with oxycodone   neurostim: sinemet /, d/c amantadine  palliative / ethics follow-up  off VEEG - neg  REHAB:  physical therapy evaluation and management    EARLY MOB:  bedrest HOB up    PULM:  trach collar 40%; pulmo toilette, nebs, atropine SL  CARDIO:  SBP goal 100-160mm Hg,  amlodipine  ENDO:  Blood sugar goals 140-180 mg/dL, A1C 5.4, ISS  GI:  off PPI s/p PEG, d/c bowel regimen  DIET: switch TF to nepro  RENAL:  Na goal 135-145, Vuong, d/c tamsulosin increase doxazosin to 8 ; d/c FW, IVC US continue phosphate binder off sodium zirconium  HEM/ONC:  Hb stable  VTE Prophylaxis: SCDs, SQH  ID: afebrile, no leukocytosis; on Unasyn for A. baumanii, MSSA+ (last day 10/23)  Social: will update family  MISC: palliative discussions with family; ofloxacin and erythromycin to R eye    Active issues:  What's keeping patient in the ICU? q2h suctioning  What is this patient's dispo plan? wean to trach collar and stepdown vs if unable to - will stepdown to vent bed    ATTENDING ATTESTATION:  I was physically present for the key portions of the evaluation and management (E/M) service provided.  I agree with the above history, physical and plan, which I have reviewed and edited where appropriate.    Patient at high risk for neurological deterioration or death due to:  ICU delirium, aspiration PNA, DVT / PE.  Critical care time:  I have personally provided 60 minutes of critical care time, excluding time spent on separate procedures.      Plan discussed with RN, house staff. 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  large pontine hemorrhage, SAH, brain edema; ?locked-in  CVA  acute MI  acute on chronic CKD, hydronephrosis    PLAN:   NEURO: neurochecks q4h, VSq4 PRN pain meds with oxycodone   neurostim: d/c sinemet  palliative / ethics follow-up  off VEEG - neg  REHAB:  physical therapy evaluation and management    EARLY MOB:  bedrest HOB up    PULM:  trach collar 35%; pulmo toilette, nebs, atropine SL  CARDIO:  SBP goal 100-160mm Hg,  amlodipine, EF 75%  ENDO:  Blood sugar goals 140-180 mg/dL, A1C 5.4, ISS  GI:  off PPI s/p PEG,  DIET: TF nepro  RENAL:  creatinine increased to 3.3, plasmalyte 500cc x1, recheck BMp this afgernoon ; Na goal 135-145, Vuong, oxazosin 8;  continue phosphate binder off sodium zirconium; TOV in 3 days  HEM/ONC:  Hb stable  VTE Prophylaxis: SCDs, SQH  ID: afebrile, no leukocytosis; on Unasyn for A. baumanii, MSSA+ (last day 10/23)  Social: will update family  MISC: palliative discussions with family; ofloxacin and erythromycin to R eye    Active issues:  What's keeping patient in the ICU? respiratory status  What is this patient's dispo plan? wean to trach collar and stepdown vs if unable to - will stepdown to vent bed    ATTENDING ATTESTATION:  I was physically present for the key portions of the evaluation and management (E/M) service provided.  I agree with the above history, physical and plan, which I have reviewed and edited where appropriate.    Patient at high risk for neurological deterioration or death due to:  ICU delirium, aspiration PNA, DVT / PE.  Critical care time:  I have personally provided 60 minutes of critical care time, excluding time spent on separate procedures.      Plan discussed with RN, house staff. 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  large pontine hemorrhage, SAH, brain edema; ?locked-in  CVA  history of MI  acute on chronic CKD, hydronephrosis    PLAN:   NEURO: neurochecks q4h, VSq4 PRN pain meds with oxycodone   neurostim: d/c sinemet  palliative / ethics follow-up  off VEEG - neg  REHAB:  physical therapy evaluation and management    EARLY MOB:  bedrest HOB up    PULM:  trach collar 35%; pulmo toilette, nebs, atropine SL  CARDIO:  SBP goal 100-160mm Hg,  amlodipine, EF 75%  ENDO:  Blood sugar goals 140-180 mg/dL, A1C 5.4, ISS  GI:  off PPI s/p PEG,  DIET: TF nepro  RENAL:  creatinine increased to 3.3, plasmalyte 500cc x1, recheck BMp this afgernoon ; Na goal 135-145, Vuong, oxazosin 8;  continue phosphate binder off sodium zirconium; TOV in 3 days  HEM/ONC:  Hb stable  VTE Prophylaxis: SCDs, SQH  ID: afebrile, no leukocytosis; on Unasyn for A. baumanii, MSSA+ (last day 10/23)  Social: will update family  MISC: palliative discussions with family; ofloxacin and erythromycin to R eye    Active issues:  What's keeping patient in the ICU? respiratory status  What is this patient's dispo plan? wean to trach collar and stepdown vs if unable to - will stepdown to vent bed    ATTENDING ATTESTATION:  I was physically present for the key portions of the evaluation and management (E/M) service provided.  I agree with the above history, physical and plan, which I have reviewed and edited where appropriate.    Patient at high risk for neurological deterioration or death due to:  ICU delirium, aspiration PNA, DVT / PE.  Critical care time:  I have personally provided 60 minutes of critical care time, excluding time spent on separate procedures.      Plan discussed with RN, house staff.

## 2024-10-25 NOTE — PROGRESS NOTE ADULT - SUBJECTIVE AND OBJECTIVE BOX
=================================  NEUROCRITICAL CARE ATTENDING NOTE  =================================    GARETT DELEON   MRN-[id number]  Summary:  44y/M  unknown past medical history (reported stroke and MI by coworkers) presented to [facility] with AMS, Pt was working at [employer] when he was found down by coworkers. EMS called and pt brought to [facility] ED. Intubated, sedated, started on cardene for SBPs in 200s. CT head showed brain stem bleed. Transferred to NSICU for further management.  (30 Sep 2024 12:55)    COURSE IN THE HOSPITAL:  Date	POD	BD	Events	Surgery  	-	-	Transferred from [facility]. A-line placed. Versed discontinued. Hydralazine, 3% saline started. Changed propofol to precedex.	-  10/1	-	-	Stability CTH done. Added labetalol, started TF. Palliative and ethics consulted. Febrile 103°F, pan cultures sent.	-  10/2	-		Desaturation to 80s, FiO2 increased. Fentanyl given, ABG, CXR ordered. Propofol started. Vanc/CTX started. MRSA negative, vanc discontinued. Na 159, starting FW 250q6.	-  10/3	-	3	Na increasing, FW increased to 300q6. Bowel regimen discontinued. vEEG started.	-  10/4	-		Albumin bolus, increased LR. 7% hypersal started for 48hrs. Nephrology consulted for CKD. SBP 170s, hydralazine given.	-  10/5	-		Hydralazine and labetalol for SBP. RT placed for diarrhea.	-  10/6	-		FW increased to 350q4. IV Tylenol for temp 100.6°F.	-  10/7	-		Pancultured for temp 101.8°F.	-  10/8	-		Cr increase, LR decreased. Simethicone added. Increased hydralazine, labetalol. Na 145, FW 250q6. Retaining fluid, bladder scans ordered.	-  10/9	-		Abd x-ray for distention. Changed tube feeds to Jevity 1.5. Tolerating CPAP.	-  10/10	-	10	Vuong for urinary retention. Increased TF to goal. Family leaning toward trach/PEG.	-  10/11	-		Trach/PEG consults placed.	-  10/12	-		MRI brain complete.	-  10/13	-	13	Increased flomax. Hold SQH for trach.	-  10/14	-		S/p trach with pulmonary. NGT placed, CXR confirmed position.	-  10/15	-	15	Resumed feeds. Spiked fever 101°F, pan cultures sent.	-  10/16	-	16	Lokelma for K+ 5.4. Started vanc/zosyn for PNA. PEG held for fever.	-  10/17	-	17	Serum and urine osm ordered. Started sinemet, FW 100q4, IVF discontinued. MRSA negative, vanc discontinued. NGT replaced.	-  10/18	-	18	Amantadine added. Changed zosyn to unasyn for acinetobacter baumannii. Failed TOV, required SC.	-  10/19	-	19	Cardura added for retention. Decreased labetalol, hydralazine. Vuong replaced.	-  10/20	-	20	NGT dislodged, replaced. PEG scheduled for tomorrow. Lokelma for hyperkalemia.  10/21	-	21	s/p PEG  10/22	-	22	No significant events overnight. 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  trach collar 40%  10/24   -	24	No significant events overnight.   10/25   -	25	No significant events overnight.       Past Medical History: Acute myocardial infarction CVA (cerebral vascular accident)  Allergies:  Allergy Status Unknown  Home meds:     PHYSICAL EXAMINATION  T(C): 37.9 (10-25-24 @ 05:26), Max: 37.9 (10-25-24 @ 05:26) HR: 86 (10-25-24 @ 05:00) (71 - 97) BP: 135/93 (10-25-24 @ 05:00) (126/86 - 147/87) RR: 10 (10-25-24 @ 05:00) (8 - 20) SpO2: 99% (10-25-24 @ 05:00) (97% - 100%)  NEUROLOGIC EXAMINATION:  Patient is B eye opens spontaneously, R2 L3 brisk, (+) bilateral corneals, not FC, tracking with eyes up and down, R UE localizes, B LE withdraws, L UE trace movements, not mimicking; reflexive hand   GENERAL: trach collar 40%  EENT:  anicteric, R eye erythematous  CARDIOVASCULAR: (+) S1 S2, normal rate and regular rhythm  PULMONARY: clear to auscultation bilaterally (q2h suctioning oral secretions, coughing sputum from trach)  ABDOMEN: soft, nontender with normoactive bowel sounds  EXTREMITIES: no edema  SKIN: no rash    LABS: 10-25  CAPILLARY BLOOD GLUCOSE 95 103               11.0 (10.5)  6.21  )-----------( 368      ( 25 Oct 2024 06:15 )             34.8      141  |  107  |  47[H]  ----------------------------<  105[H]  5.4[H]   |  20[L]  |  3.21[H]    Ca    9.4      25 Oct 2024 06:15  Phos  5.0     10-25  Mg     2.5     10-25    10-24 @ 07:01  -  10-25 @ 07:00  --------------------------------------------------------  IN: 860 mL / OUT: 2134 mL / NET: -1274 mL    ABG - ( 23 Oct 2024 23:00 ) pH, Arterial: 7.33  pH, Blood: x     /  pCO2: 43    /  pO2: 99    / HCO3: 23    / Base Excess: -3.2  /  SaO2: 98.5   - mild respiratory acidosis     Bacteriology:  10/16 sputum culture acinetobacter  10/15 Blood CS NG5D    CSF studies:  EEG:  Neuroimagin2024 9:30 AM	CT Brain	Large pontine hemorrhage, SAH, edema, no hydrocephalus. Multiple old infarcts.  2024 1:11 PM	CTA Head/Neck	Pontine hemorrhage stable, no malformation, stable ventricular size, no stenosis or aneurysm.  2024 6:40 PM	CT Brain	Enlarged pontine hemorrhage, SAH in frontal lobes.  2024        	CT Brain	Stable pontine hemorrhage, SAH stable.  10/03/2024 11:06 AM	CT Brain	Stable hematoma, no rebleeding.  10/12/2024 9:16 AM	MRI Brain	Stable hemorrhage, no new bleeding, cerebellar infarct.    Other imaging:  Date/Time	Type of Study	Results  10/01/2024 11:49 AM	US Retroperit	No hydronephrosis; chronic renal disease; Vuong catheter.  10/01/2024 11:14 AM	XR Abdomen	Orogastric tube coiled in stomach; nonspecific bowel gas pattern.  10/07/2024 10:25 PM	XR Abdomen	Nonspecific bowel gas pattern.  10/08/2024 8:01 AM	US Retroperit	Normal kidneys; distended bladder; mild hydronephrosis.  10/10/2024 7:32 AM	XR Abdomen	Nasogastric tube; nonspecific bowel gas pattern.  10/19/2024 1:13 PM	US DPLX Veins	No DVT in lower extremities.    MEDICATIONS: 10-25    ·	heparin   Injectable 5000 SubCutaneous every 8 hours  ·	carbidopa/levodopa  25/100 1 Oral <User Schedule>  ·	amLODIPine   Tablet 10 milliGRAM(s) Oral daily  ·	doxazosin 8 milliGRAM(s) Oral at bedtime  ·	acetylcysteine 10%  Inhalation 4 Inhalation every 12 hours  ·	albuterol/ipratropium for Nebulization 3 Nebulizer every 12 hours  ·	artificial tears (preservative free) Ophthalmic Solution 1 Right EYE every 2 hours  ·	atropine 1% Ophthalmic Solution for SubLingual Use 2 SubLingual every 8 hours  ·	calcium acetate 667 Oral three times a day with meals  ·	erythromycin   Ointment 1 Right EYE every 4 hours  ·	ofloxacin 0.3% Solution 1 Right EYE four times a day  ·	petrolatum Ophthalmic Ointment 1 Both EYES two times a day  ·	acetaminophen   Oral Liquid .. 650 Oral every 6 hours PRN  ·	oxyCODONE    Solution 5 Oral every 4 hours PRN     IV FLUIDS: Free 100 q6h  DRIPS:  DIET: Jevity 1.5 @ goal  Lines:   Drains:    Wounds:    CODE STATUS:  Full Code                       GOALS OF CARE:  aggressive                      DISPOSITION:  ICU  ICH Score 3 =================================  NEUROCRITICAL CARE ATTENDING NOTE  =================================    GARETT DELEON   MRN-[id number]  Summary:  44y/M  unknown past medical history (reported stroke and MI by coworkers) presented to [facility] with AMS, Pt was working at [employer] when he was found down by coworkers. EMS called and pt brought to [facility] ED. Intubated, sedated, started on cardene for SBPs in 200s. CT head showed brain stem bleed. Transferred to NSICU for further management.  (30 Sep 2024 12:55)    COURSE IN THE HOSPITAL:  Date	POD	BD	Events	Surgery  	-	-	Transferred from [facility]. A-line placed. Versed discontinued. Hydralazine, 3% saline started. Changed propofol to precedex.	-  10/1	-	-	Stability CTH done. Added labetalol, started TF. Palliative and ethics consulted. Febrile 103°F, pan cultures sent.	-  10/2	-		Desaturation to 80s, FiO2 increased. Fentanyl given, ABG, CXR ordered. Propofol started. Vanc/CTX started. MRSA negative, vanc discontinued. Na 159, starting FW 250q6.	-  10/3	-	3	Na increasing, FW increased to 300q6. Bowel regimen discontinued. vEEG started.	-  10/4	-		Albumin bolus, increased LR. 7% hypersal started for 48hrs. Nephrology consulted for CKD. SBP 170s, hydralazine given.	-  10/5	-		Hydralazine and labetalol for SBP. RT placed for diarrhea.	-  10/6	-		FW increased to 350q4. IV Tylenol for temp 100.6°F.	-  10/7	-		Pancultured for temp 101.8°F.	-  10/8	-		Cr increase, LR decreased. Simethicone added. Increased hydralazine, labetalol. Na 145, FW 250q6. Retaining fluid, bladder scans ordered.	-  10/9	-		Abd x-ray for distention. Changed tube feeds to Jevity 1.5. Tolerating CPAP.	-  10/10	-	10	Vuong for urinary retention. Increased TF to goal. Family leaning toward trach/PEG.	-  10/11	-		Trach/PEG consults placed.	-  10/12	-		MRI brain complete.	-  10/13	-	13	Increased flomax. Hold SQH for trach.	-  10/14	-		S/p trach with pulmonary. NGT placed, CXR confirmed position.	-  10/15	-	15	Resumed feeds. Spiked fever 101°F, pan cultures sent.	-  10/16	-	16	Lokelma for K+ 5.4. Started vanc/zosyn for PNA. PEG held for fever.	-  10/17	-	17	Serum and urine osm ordered. Started sinemet, FW 100q4, IVF discontinued. MRSA negative, vanc discontinued. NGT replaced.	-  10/18	-	18	Amantadine added. Changed zosyn to unasyn for acinetobacter baumannii. Failed TOV, required SC.	-  10/19	-	19	Cardura added for retention. Decreased labetalol, hydralazine. Vuong replaced.	-  10/20	-	20	NGT dislodged, replaced. PEG scheduled for tomorrow. Lokelma for hyperkalemia.  10/21	-	21	s/p PEG  10/22	-	22	No significant events overnight. 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  trach collar 40%  10/24   -	24	No significant events overnight.   10/25   -	25	No significant events overnight.     Past Medical History: Acute myocardial infarction CVA (cerebral vascular accident)  Allergies:  Allergy Status Unknown  Home meds:     PHYSICAL EXAMINATION  T(C): 37.9 (10-25-24 @ 05:26), Max: 37.9 (10-25-24 @ 05:26) HR: 86 (10-25-24 @ 05:00) (71 - 97) BP: 135/93 (10-25-24 @ 05:00) (126/86 - 147/87) RR: 10 (10-25-24 @ 05:00) (8 - 20) SpO2: 99% (10-25-24 @ 05:00) (97% - 100%)  NEUROLOGIC EXAMINATION:  Patient is B eye opens spontaneously, R2 L3 brisk, (+) bilateral corneals, not FC, tracking with eyes up and down, R UE localizes, B LE withdraws, L UE trace movements, not mimicking; reflexive hand   GENERAL: trach collar 40%  EENT:  anicteric, R eye erythematous  CARDIOVASCULAR: (+) S1 S2, normal rate and regular rhythm  PULMONARY: clear to auscultation bilaterally (q4h suctioning oral secretions, coughing sputum from trach)  ABDOMEN: soft, nontender with normoactive bowel sounds  EXTREMITIES: no edema  SKIN: no rash    LABS: 10-25  CAPILLARY BLOOD GLUCOSE 95 103               11.0 (10.5)  6.21  )-----------( 368      ( 25 Oct 2024 06:15 )             34.8      141  |  107  |  47[H]  ----------------------------<  105[H]  5.4[H]   |  20[L]  |  3.21[H]    Ca    9.4      25 Oct 2024 06:15  Phos  5.0     10-25  Mg     2.5     10-25    10-24 @ 07:01  -  10-25 @ 07:00  --------------------------------------------------------  IN: 860 mL / OUT: 2134 mL / NET: -1274 mL    ABG - ( 23 Oct 2024 23:00 ) pH, Arterial: 7.33  pH, Blood: x     /  pCO2: 43    /  pO2: 99    / HCO3: 23    / Base Excess: -3.2  /  SaO2: 98.5   - mild respiratory acidosis     Bacteriology:  10/16 sputum culture acinetobacter  10/15 Blood CS NG5D    CSF studies:  EEG:  Neuroimagin2024 9:30 AM	CT Brain	Large pontine hemorrhage, SAH, edema, no hydrocephalus. Multiple old infarcts.  2024 1:11 PM	CTA Head/Neck	Pontine hemorrhage stable, no malformation, stable ventricular size, no stenosis or aneurysm.  2024 6:40 PM	CT Brain	Enlarged pontine hemorrhage, SAH in frontal lobes.  2024        	CT Brain	Stable pontine hemorrhage, SAH stable.  10/03/2024 11:06 AM	CT Brain	Stable hematoma, no rebleeding.  10/12/2024 9:16 AM	MRI Brain	Stable hemorrhage, no new bleeding, cerebellar infarct.    Other imaging:  Date/Time	Type of Study	Results  10/01/2024 11:49 AM	US Retroperit	No hydronephrosis; chronic renal disease; Vuong catheter.  10/01/2024 11:14 AM	XR Abdomen	Orogastric tube coiled in stomach; nonspecific bowel gas pattern.  10/07/2024 10:25 PM	XR Abdomen	Nonspecific bowel gas pattern.  10/08/2024 8:01 AM	US Retroperit	Normal kidneys; distended bladder; mild hydronephrosis.  10/10/2024 7:32 AM	XR Abdomen	Nasogastric tube; nonspecific bowel gas pattern.  10/19/2024 1:13 PM	US DPLX Veins	No DVT in lower extremities.    MEDICATIONS: 10-25    ·	heparin   Injectable 5000 SubCutaneous every 8 hours  ·	carbidopa/levodopa  25/100 1 Oral <User Schedule>  ·	amLODIPine   Tablet 10 milliGRAM(s) Oral daily  ·	doxazosin 8 milliGRAM(s) Oral at bedtime  ·	acetylcysteine 10%  Inhalation 4 Inhalation every 12 hours  ·	albuterol/ipratropium for Nebulization 3 Nebulizer every 12 hours  ·	artificial tears (preservative free) Ophthalmic Solution 1 Right EYE every 2 hours  ·	atropine 1% Ophthalmic Solution for SubLingual Use 2 SubLingual every 8 hours  ·	calcium acetate 667 Oral three times a day with meals  ·	erythromycin   Ointment 1 Right EYE every 4 hours  ·	ofloxacin 0.3% Solution 1 Right EYE four times a day  ·	petrolatum Ophthalmic Ointment 1 Both EYES two times a day  ·	acetaminophen   Oral Liquid .. 650 Oral every 6 hours PRN  ·	oxyCODONE    Solution 5 Oral every 4 hours PRN     IV FLUIDS:   DRIPS:  DIET: Nepro @ 40cc/hr  Lines:   Drains:   Vuong (inserted 10/24)  Wounds:    CODE STATUS:  Full Code                       GOALS OF CARE:  aggressive                      DISPOSITION:  ICU  ICH Score 3
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
## 2024-10-25 NOTE — PROGRESS NOTE ADULT - SUBJECTIVE AND OBJECTIVE BOX
HOSPITAL COURSE:         Hospital Course:         Vital Signs Last 24 Hrs  T(C): 37.5 (25 Oct 2024 00:35), Max: 37.5 (25 Oct 2024 00:35)  T(F): 99.5 (25 Oct 2024 00:35), Max: 99.5 (25 Oct 2024 00:35)  HR: 83 (24 Oct 2024 22:00) (67 - 97)  BP: 131/90 (24 Oct 2024 22:00) (126/86 - 154/90)  BP(mean): 105 (24 Oct 2024 22:00) (100 - 113)  RR: 12 (24 Oct 2024 22:00) (7 - 20)  SpO2: 99% (24 Oct 2024 22:00) (98% - 100%)    Parameters below as of 24 Oct 2024 22:00  Patient On (Oxygen Delivery Method): tracheostomy collar  O2 Flow (L/min): 15  O2 Concentration (%): 40    I&O's Detail    23 Oct 2024 07:01  -  24 Oct 2024 07:00  --------------------------------------------------------  IN:    Enteral Tube Flush: 290 mL    Free Water: 100 mL    IV PiggyBack: 100 mL    Jevity 1.5: 480 mL  Total IN: 970 mL    OUT:    Intermittent Catheterization - Urethral (mL): 4100 mL    Rectal Tube (mL): 300 mL  Total OUT: 4400 mL    Total NET: -3430 mL      24 Oct 2024 07:01  -  25 Oct 2024 00:40  --------------------------------------------------------  IN:    Enteral Tube Flush: 180 mL    Nepro with Carb Steady: 480 mL  Total IN: 660 mL    OUT:    Indwelling Catheter - Urethral (mL): 1404 mL  Total OUT: 1404 mL    Total NET: -744 mL        I&O's Summary    23 Oct 2024 07:01  -  24 Oct 2024 07:00  --------------------------------------------------------  IN: 970 mL / OUT: 4400 mL / NET: -3430 mL    24 Oct 2024 07:01  -  25 Oct 2024 00:40  --------------------------------------------------------  IN: 660 mL / OUT: 1404 mL / NET: -744 mL        PHYSICAL EXAM:  Neurological:    Motor exam:         [] Upper extremity              Bi(c5)  WE(c6)  EE(c7)   FF(c8)                                                R         5/5        5/5        5/5       5/5                                               L          5/5        5/5        5/5       5/5         [] Lower extremeity          HF(l2)   KE(l3)    TA(l4)   EHL(l5)  GS(s1)                                                 R        5/5        5/5        5/5       5/5         5/5                                               L         5/5        5/5       5/5       5/5          5/5                                                        [] warm well perfused; capillary refill <3 seconds     Sensation: [] intact to light touch  [] decreased:       Cardiovascular:  Respiratory:  Gastrointestinal:  Genitourinary:  Extremities:    Incision/Wound:    DEVICE/DRAIN DRESSING:    TUBES/LINES:  [] CVC  [] A-line  [] Lumbar Drain  [] Ventriculostomy  [] Other    DIET:  [] NPO  [] Mechanical  [] Tube feeds    LABS:    Urinalysis Basic - ( 24 Oct 2024 05:29 )    Color: x / Appearance: x / SG: x / pH: x  Gluc: 108 mg/dL / Ketone: x  / Bili: x / Urobili: x   Blood: x / Protein: x / Nitrite: x   Leuk Esterase: x / RBC: x / WBC x   Sq Epi: x / Non Sq Epi: x / Bacteria: x          CAPILLARY BLOOD GLUCOSE      POCT Blood Glucose.: 95 mg/dL (24 Oct 2024 16:18)  POCT Blood Glucose.: 103 mg/dL (24 Oct 2024 11:07)      Drug Levels: [] N/A    CSF Analysis: [] N/A      Allergies    Allergy Status Unknown    Intolerances      MEDICATIONS:  Antibiotics:    Neuro:  acetaminophen   Oral Liquid .. 650 milliGRAM(s) Oral every 6 hours PRN  carbidopa/levodopa  25/100 1 Tablet(s) Oral <User Schedule>  oxyCODONE    Solution 5 milliGRAM(s) Oral every 4 hours PRN    Anticoagulation:  heparin   Injectable 5000 Unit(s) SubCutaneous every 8 hours    OTHER:  acetylcysteine 10%  Inhalation 4 milliLiter(s) Inhalation every 12 hours  albuterol/ipratropium for Nebulization 3 milliLiter(s) Nebulizer every 12 hours  amLODIPine   Tablet 10 milliGRAM(s) Oral daily  artificial tears (preservative free) Ophthalmic Solution 1 Drop(s) Right EYE every 2 hours  atropine 1% Ophthalmic Solution for SubLingual Use 2 Drop(s) SubLingual every 8 hours  chlorhexidine 2% Cloths 1 Application(s) Topical <User Schedule>  doxazosin 8 milliGRAM(s) Oral at bedtime  erythromycin   Ointment 1 Application(s) Right EYE every 4 hours  ofloxacin 0.3% Solution 1 Drop(s) Right EYE four times a day  petrolatum Ophthalmic Ointment 1 Application(s) Both EYES two times a day    IVF:  calcium acetate 667 milliGRAM(s) Oral three times a day with meals    CULTURES:  Culture Results:   Mixed gram negative rods including  Moderate Acinetobacter baumannii/nosocomialis group (10-16 @ 00:13)  Culture Results:   No growth at 5 days (10-15 @ 14:55)    RADIOLOGY & ADDITIONAL TESTS:      ASSESSMENT:  44y Male s/p    AMS    Handoff    Acute myocardial infarction    CVA (cerebral vascular accident)    Intracerebral hemorrhage of brain stem    Brainstem stroke    Brain stem stroke syndrome    Brain stem hemorrhage    Brain stem stroke syndrome    Hemorrhagic stroke    Brainstem stroke    Encephalopathy acute    Functional quadriplegia    Advanced care planning/counseling discussion    Encounter for palliative care    Percutaneous tracheal puncture    Altered mental status examination    EGD, with PEG    AMS    [email or web address]        PLAN:  NEURO:    CARDIOVASCULAR:    PULMONARY:    RENAL:    GI:    HEME:    ID:    ENDO:    DVT PROPHYLAXIS:  [] Venodynes                                [] Heparin/Lovenox    FALL RISK:  [] Low Risk                                    [] Impulsive    DISPOSITION:  HPI:  45 yo male, unknown past medical history (reported stroke and MI by coworkers) presented to [facility] with AMS, Pt was working at [employer] when he was found down by coworkers. EMS called and pt brought to [facility] ED. Intubated, sedated, started on cardene for SBPs in 200s. CT head showed brain stem bleed. (NIHSS 33, ICH score 3).  Transferred to NSICU for further management.       HOSPITAL COURSE:   9/30: transferred from [facility]. A line placed. Versed dc'd. Cy Rader at bedside, states pt has family in [place], cannot confirm medications or PMH other than stroke and MI. 250cc bolus 3% given. LR switched to NS. hydralazine 25q8 started, 3% started, switched propofol to precedex   10/1: stability CTH done. Added labetalol, started TF. Palliative consulted. ethics consulted to determine surrogate. febrile 103, pan cx sent  10/2: BD 2, GEORGE overnight. TF resumed. Desatt'd to 80s, FiO2 inc. to 50. Fentanyl given, ABG, CXR ordered. Maxxed on precedex, started on propofol for DARIEN -4 - -5. Precedex dc'd. Duonebs, mucomyst, hypertonic added. 3% dc'd. Cardene dc'd. Start vanc/CTX. Increased labetalol 200q8. MRSA negative, dc'd vanc. ETT pulled back 2cm x 2, good positioning after confirmatory chest xray. Ethics attempting to establish HCP with family. Na 159, starting FW 250q6 for range 150-155.   10/3: BD3, GEORGE o/n, neuro stable. Na elevating, FW increased to 300q6. Dc'd bowel reg for diarrhea. vEEG started. SQH 5000q8 tonight.   10/4: BD 4, albumn bolus, incr. LR to 80 2/2 incr. in Cr, LR to 100cc/hr for uptrending Cr. Started 7% hypersal for 48hrs and SL atropine for inline/oral thick secretions. Dc'd CTX and started ancef for MSSA in the sputum. Nephrology consulted for CKD, f/u recs. SBP 170s, given hydralazine 10mg IVP.   10/5: BD5, o/n 10mg IVP hydralazine given for SBP 170s and started on hydralazine 25q8 via OGT. 10mg IV push labetalol for SBP > 160s. RT placed for diarrhea.   10/6: BD6, o/n FW increased to 350q4 per nephrology recs. IV tylenol for temp 100.6, SBp 160s presumed uncomfortable.   10/7: BD7, overnight pancultured for temp 101.8F.   10/8: BD8. GEORGE. Cr bumped. decreased LR to 75cc/hr. Adding simethicone ATC. incr hydralazine 50mgTID. Incr labetalol 300mgTID. Na 145, decreased FWF to 250q6. Start precedex. FENa consistent with intrinsic kidney injury. Pend repeat renal US. Retaining up to 1.3L, bladder scans q6, straight cath PRN  10/9: BD 9. GEORGE overnight. Neuro stable. abd xray for distention w non-specific gas pattern, OGT to LIWS for morning. duonebs/mucomyst to q8 for improving secretions. Changed tube feeds to Jevity 1.5 20cc/hr, low rate due to abdominal distention, nepro dense and more difficult to digest. Tolerating CPAP, confirmed by ABG.   10/10: BD 10. GEORGE overnight. Neuro stable. (+) gabriel for urinary retention on bladder scan. inc TF to goal rate of 40cc/hr. family leaning toward pursuing trach/PEG. 1/2 amp for FS 81.   10/11: BD 11. GEORGE overnight. Neuro stable. Trach/PEG consults placed.   10/12: BD 12. GEORGE overnight. Neuro stable. MRI brain complete.   10/13: BD 13. Increase flomax. Hold SQH after PM dose for trach tm. IVL.   10/14: BD 14. GEORGE overnight, remains on AC/VC. Gabriel placed for urinary retention. Dc'd free water.  S/p trach with pulm. NGT placed and CXR confirmed in good position.   10/15: BD 15, GEORGE ovn. resumed feeds. spiked 101, pan cx sent.   10/16: BD 16. GEORGE ovn. Lokelma 5mg for K+ 5.4. Started vanc q 24/zosyn for empiric PNA coverage, IVF to 100/hr. PEG held for fever.   10/17: BD 17,  ordered serum osm and urine osm for am. Started sinemet for neurostimulation. Increased cardura to 0.8. Started FW 100q4, dc'd IVF. MRSA negative, dc'd vanc. NGT replaced d/t coiling.   10/18: BD 18, GEORGE overnight, neuro stable. Amantadine added for neurostim. zosyn changed to unasyn for acinetobacter baumannii, failed TOV and required SC  10/19: BD 19, GEORGE ovn. cardura 2mg added for retention. labetalol decreased 200q8, hydralazine decreased 25q8. Gabriel replaced.   10/20: BD20, GEORGE overnight. NGT dislodged, replaced. PEG tomorrow w/ gen surg, FW increased to 150q4 and labetalol decreased to 100q8, lokelma given for hyperkalemia.   10/21: BD 21. POD0 PEG placement with Gen surg. decr labetolol to 50q8, incr. cardura to 0.4, started lokelma and phoslo, dc gabriel POD0 PEG placement with Gen surg.  10/22: BD 22. Plan to start TF today via PEG. dc labetalol, Following ophtho recs. Increased apnea settings - found to be in cheyne-moe respiration. CPAP 5/5.  10/23: BD 23. hydralazine d/c'd, trach collar trial today. Rectal tube placed at 6am.  10/24: BD24, o/n lokelma held due to diarrhea. Free water 100q6 resumed. dc'd tamsulosin, amantadine. Incr'd cardura to 8mg qhs. Dc'd FW. Switched jevity to nepro. gabriel placed for high urine output. Started SL atropine for oral secretions. Dc'd free water.  10/25: BD25, o/n decreased suctioning requirements to > q4hrs, GEORGE. Cr improving, cont phoslo, lokelma held at this time. Gabriel placed yest, cont. Tolerating trach collar.       Vital Signs Last 24 Hrs  T(C): 37.5 (25 Oct 2024 00:35), Max: 37.5 (25 Oct 2024 00:35)  T(F): 99.5 (25 Oct 2024 00:35), Max: 99.5 (25 Oct 2024 00:35)  HR: 83 (24 Oct 2024 22:00) (67 - 97)  BP: 131/90 (24 Oct 2024 22:00) (126/86 - 154/90)  BP(mean): 105 (24 Oct 2024 22:00) (100 - 113)  RR: 12 (24 Oct 2024 22:00) (7 - 20)  SpO2: 99% (24 Oct 2024 22:00) (98% - 100%)    Parameters below as of 24 Oct 2024 22:00  Patient On (Oxygen Delivery Method): tracheostomy collar  O2 Flow (L/min): 15  O2 Concentration (%): 40    I&O's Detail    23 Oct 2024 07:01  -  24 Oct 2024 07:00  --------------------------------------------------------  IN:    Enteral Tube Flush: 290 mL    Free Water: 100 mL    IV PiggyBack: 100 mL    Jevity 1.5: 480 mL  Total IN: 970 mL    OUT:    Intermittent Catheterization - Urethral (mL): 4100 mL    Rectal Tube (mL): 300 mL  Total OUT: 4400 mL    Total NET: -3430 mL      24 Oct 2024 07:01  -  25 Oct 2024 00:40  --------------------------------------------------------  IN:    Enteral Tube Flush: 180 mL    Nepro with Carb Steady: 480 mL  Total IN: 660 mL    OUT:    Indwelling Catheter - Urethral (mL): 1404 mL  Total OUT: 1404 mL    Total NET: -744 mL        I&O's Summary    23 Oct 2024 07:01  -  24 Oct 2024 07:00  --------------------------------------------------------  IN: 970 mL / OUT: 4400 mL / NET: -3430 mL    24 Oct 2024 07:01  -  25 Oct 2024 00:40  --------------------------------------------------------  IN: 660 mL / OUT: 1404 mL / NET: -744 mL        PHYSICAL EXAM:  General: NAD, pt is comfortably resting in bed, unable to assess orientation status, [place] speaking   HEENT: tracking vertically only, attempts to track to voice (right > left), right pupil 1mm sluggish, left pupil 3mm briskly reactive, +L corneal, +cough/gag, face symmetric, attempts to stick out tongue to command   Cardiovascular: RRR, normal S1 and S2   Respiratory: lungs CTAB, no wheezing, rhonchi, or crackles, +ET tube (trach collar)   GI: normoactive BS to auscultation, abd soft, NTND, +PEG and abdominal binder   Neuro: no verbal output, wiggled toes to command intermittently on right foot, mimics 2 fingers with right hand, DARRELL/LL trace mvmt spont/noxious   unable to assess sensation     TUBES/LINES:  [] CVC  [] A-line  [] Lumbar Drain  [] Ventriculostomy  [] Other    DIET:  [] NPO  [X] Mechanical  [] Tube feeds      LABS:    Urinalysis Basic - ( 24 Oct 2024 05:29 )    Color: x / Appearance: x / SG: x / pH: x  Gluc: 108 mg/dL / Ketone: x  / Bili: x / Urobili: x   Blood: x / Protein: x / Nitrite: x   Leuk Esterase: x / RBC: x / WBC x   Sq Epi: x / Non Sq Epi: x / Bacteria: x          CAPILLARY BLOOD GLUCOSE      POCT Blood Glucose.: 95 mg/dL (24 Oct 2024 16:18)  POCT Blood Glucose.: 103 mg/dL (24 Oct 2024 11:07)      Drug Levels: [] N/A    CSF Analysis: [] N/A      Allergies    Allergy Status Unknown    Intolerances      MEDICATIONS:  Antibiotics:    Neuro:  acetaminophen   Oral Liquid .. 650 milliGRAM(s) Oral every 6 hours PRN  carbidopa/levodopa  25/100 1 Tablet(s) Oral <User Schedule>  oxyCODONE    Solution 5 milliGRAM(s) Oral every 4 hours PRN    Anticoagulation:  heparin   Injectable 5000 Unit(s) SubCutaneous every 8 hours    OTHER:  acetylcysteine 10%  Inhalation 4 milliLiter(s) Inhalation every 12 hours  albuterol/ipratropium for Nebulization 3 milliLiter(s) Nebulizer every 12 hours  amLODIPine   Tablet 10 milliGRAM(s) Oral daily  artificial tears (preservative free) Ophthalmic Solution 1 Drop(s) Right EYE every 2 hours  atropine 1% Ophthalmic Solution for SubLingual Use 2 Drop(s) SubLingual every 8 hours  chlorhexidine 2% Cloths 1 Application(s) Topical <User Schedule>  doxazosin 8 milliGRAM(s) Oral at bedtime  erythromycin   Ointment 1 Application(s) Right EYE every 4 hours  ofloxacin 0.3% Solution 1 Drop(s) Right EYE four times a day  petrolatum Ophthalmic Ointment 1 Application(s) Both EYES two times a day    IVF:  calcium acetate 667 milliGRAM(s) Oral three times a day with meals    CULTURES:  Culture Results:   Mixed gram negative rods including  Moderate Acinetobacter baumannii/nosocomialis group (10-16 @ 00:13)  Culture Results:   No growth at 5 days (10-15 @ 14:55)    RADIOLOGY & ADDITIONAL TESTS:      ASSESSMENT:  45 y/o PMH ?stroke/MI present to [facility] after collapsing at work. Decorticate posturing, vomiting, intubated for airway protection. 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 Transferred to [facility] for [facility] management. s/p trach 10/14. s/p peg 10/21.        AMS    Handoff    Acute myocardial infarction    CVA (cerebral vascular accident)    Intracerebral hemorrhage of brain stem    Brainstem stroke    Brain stem stroke syndrome    Brain stem hemorrhage    Brain stem stroke syndrome    Hemorrhagic stroke    Brainstem stroke    Encephalopathy acute    Functional quadriplegia    Advanced care planning/counseling discussion    Encounter for palliative care    Percutaneous tracheal puncture    Altered mental status examination    EGD, with PEG    AMS    [email or web address]        PLAN:  Neuro:  - Neuro q4/vitals q2  - pain control: Tylenol prn, oxy prn  - CTH 9/30: enlarged pontine hemorrhage, CTH 10/3: read stable   - vEEG (10/3-4)- negative, (10/17-10/19) - negative  - stroke core measures, stroke neuro signed off  - MRI brain w/ w/o contrast 10/12: parenchymal hemorrhage, acute/subacute R cerebellar stroke    - neurostimulation: sinemet 25/100 BID    CV:  - -160  - HTN: amlodipine 10 mg qd  - echo (9/30) EF 75%    Resp:  - tolerating trach collar 40%  - duonebs/mucomyst q12h, sublingual atropine q8hrs for oral secretions, suctionig less frequent     GI:  - Nepro TF via PEG (placed 10/21 by gen surg)  - bowel reg held for loose stool, LBM 10/24  - rectal tube (10/23-    Renal:  - IVL, normonatremic goal  - hyperK 10/20: lokelma dc'd (10/21 -10/23)  - hyperPhos: phoslo 667mg TID (10/21 -)  - Urinary retenion: Cardura 8 mg   - gabriel (10/14-10/18), (10/19-22), gabriel replaced 10/24-   - CKD: trend Cr  - renal US 10/1: echogenicity c/w chronic med renal dz, repeat 10/8: inc renal echogeicity, c/f medical renal disease w increased hydro     Endo:  - A1c 5.4    Heme:  - DVT ppx: SCDs, SQH 5000u q8h     ID:  - 10/15 sputum +actinobacter baumanii, s/p unasyn (10/18-10/23)  - MRSA swab negative (10/2), sputum MSSA + , s/p 1 dose vanc (10/2), CTX (10/2 - 10/4), Ancef (10/4-10/14)     MISC:  - ophtho consult for keratitis, rec erythromycin ointment to rt eye q4hrs, ofloxacin ointment to rt eye QID, artificial tears to rt eye q2hrs, moisture chamber at bedtime    Dispo: NSICU, full code, pending placement and emergency medicaid     D/w Dr. D'Amico and Dr. Tomlin HPI:  45 yo male, unknown past medical history (reported stroke and MI by coworkers) presented to [facility] with AMS, Pt was working at [employer] when he was found down by coworkers. EMS called and pt brought to [facility] ED. Intubated, sedated, started on cardene for SBPs in 200s. CT head showed brain stem bleed. (NIHSS 33, ICH score 3).  Transferred to NSICU for further management.       HOSPITAL COURSE:   9/30: transferred from [facility]. A line placed. Versed dc'd. Cy Rader at bedside, states pt has family in [place], cannot confirm medications or PMH other than stroke and MI. 250cc bolus 3% given. LR switched to NS. hydralazine 25q8 started, 3% started, switched propofol to precedex   10/1: stability CTH done. Added labetalol, started TF. Palliative consulted. ethics consulted to determine surrogate. febrile 103, pan cx sent  10/2: BD 2, GEORGE overnight. TF resumed. Desatt'd to 80s, FiO2 inc. to 50. Fentanyl given, ABG, CXR ordered. Maxxed on precedex, started on propofol for DARIEN -4 - -5. Precedex dc'd. Duonebs, mucomyst, hypertonic added. 3% dc'd. Cardene dc'd. Start vanc/CTX. Increased labetalol 200q8. MRSA negative, dc'd vanc. ETT pulled back 2cm x 2, good positioning after confirmatory chest xray. Ethics attempting to establish HCP with family. Na 159, starting FW 250q6 for range 150-155.   10/3: BD3, GEORGE o/n, neuro stable. Na elevating, FW increased to 300q6. Dc'd bowel reg for diarrhea. vEEG started. SQH 5000q8 tonight.   10/4: BD 4, albumn bolus, incr. LR to 80 2/2 incr. in Cr, LR to 100cc/hr for uptrending Cr. Started 7% hypersal for 48hrs and SL atropine for inline/oral thick secretions. Dc'd CTX and started ancef for MSSA in the sputum. Nephrology consulted for CKD, f/u recs. SBP 170s, given hydralazine 10mg IVP.   10/5: BD5, o/n 10mg IVP hydralazine given for SBP 170s and started on hydralazine 25q8 via OGT. 10mg IV push labetalol for SBP > 160s. RT placed for diarrhea.   10/6: BD6, o/n FW increased to 350q4 per nephrology recs. IV tylenol for temp 100.6, SBp 160s presumed uncomfortable.   10/7: BD7, overnight pancultured for temp 101.8F.   10/8: BD8. GEORGE. Cr bumped. decreased LR to 75cc/hr. Adding simethicone ATC. incr hydralazine 50mgTID. Incr labetalol 300mgTID. Na 145, decreased FWF to 250q6. Start precedex. FENa consistent with intrinsic kidney injury. Pend repeat renal US. Retaining up to 1.3L, bladder scans q6, straight cath PRN  10/9: BD 9. GEORGE overnight. Neuro stable. abd xray for distention w non-specific gas pattern, OGT to LIWS for morning. duonebs/mucomyst to q8 for improving secretions. Changed tube feeds to Jevity 1.5 20cc/hr, low rate due to abdominal distention, nepro dense and more difficult to digest. Tolerating CPAP, confirmed by ABG.   10/10: BD 10. GEORGE overnight. Neuro stable. (+) gabriel for urinary retention on bladder scan. inc TF to goal rate of 40cc/hr. family leaning toward pursuing trach/PEG. 1/2 amp for FS 81.   10/11: BD 11. GEORGE overnight. Neuro stable. Trach/PEG consults placed.   10/12: BD 12. GEORGE overnight. Neuro stable. MRI brain complete.   10/13: BD 13. Increase flomax. Hold SQH after PM dose for trach tm. IVL.   10/14: BD 14. GEORGE overnight, remains on AC/VC. Gabriel placed for urinary retention. Dc'd free water.  S/p trach with pulm. NGT placed and CXR confirmed in good position.   10/15: BD 15, GEORGE ovn. resumed feeds. spiked 101, pan cx sent.   10/16: BD 16. GEORGE ovn. Lokelma 5mg for K+ 5.4. Started vanc q 24/zosyn for empiric PNA coverage, IVF to 100/hr. PEG held for fever.   10/17: BD 17,  ordered serum osm and urine osm for am. Started sinemet for neurostimulation. Increased cardura to 0.8. Started FW 100q4, dc'd IVF. MRSA negative, dc'd vanc. NGT replaced d/t coiling.   10/18: BD 18, GEORGE overnight, neuro stable. Amantadine added for neurostim. zosyn changed to unasyn for acinetobacter baumannii, failed TOV and required SC  10/19: BD 19, GEORGE ovn. cardura 2mg added for retention. labetalol decreased 200q8, hydralazine decreased 25q8. Gabriel replaced.   10/20: BD20, GEORGE overnight. NGT dislodged, replaced. PEG tomorrow w/ gen surg, FW increased to 150q4 and labetalol decreased to 100q8, lokelma given for hyperkalemia.   10/21: BD 21. POD0 PEG placement with Gen surg. decr labetolol to 50q8, incr. cardura to 0.4, started lokelma and phoslo, dc gabriel POD0 PEG placement with Gen surg.  10/22: BD 22. Plan to start TF today via PEG. dc labetalol, Following ophtho recs. Increased apnea settings - found to be in cheyne-moe respiration. CPAP 5/5.  10/23: BD 23. hydralazine d/c'd, trach collar trial today. Rectal tube placed at 6am.  10/24: BD24, o/n lokelma held due to diarrhea. Free water 100q6 resumed. dc'd tamsulosin, amantadine. Incr'd cardura to 8mg qhs. Dc'd FW. Switched jevity to nepro. gabriel placed for high urine output. Started SL atropine for oral secretions. Dc'd free water.  10/25: BD25, o/n decreased suctioning requirements to > q4hrs, GEORGE. Cr improving, cont phoslo, lokelma held at this time. Gabriel placed yest, cont. Tolerating trach collar.       Vital Signs Last 24 Hrs  T(C): 37.5 (25 Oct 2024 00:35), Max: 37.5 (25 Oct 2024 00:35)  T(F): 99.5 (25 Oct 2024 00:35), Max: 99.5 (25 Oct 2024 00:35)  HR: 83 (24 Oct 2024 22:00) (67 - 97)  BP: 131/90 (24 Oct 2024 22:00) (126/86 - 154/90)  BP(mean): 105 (24 Oct 2024 22:00) (100 - 113)  RR: 12 (24 Oct 2024 22:00) (7 - 20)  SpO2: 99% (24 Oct 2024 22:00) (98% - 100%)    Parameters below as of 24 Oct 2024 22:00  Patient On (Oxygen Delivery Method): tracheostomy collar  O2 Flow (L/min): 15  O2 Concentration (%): 40    I&O's Detail    23 Oct 2024 07:01  -  24 Oct 2024 07:00  --------------------------------------------------------  IN:    Enteral Tube Flush: 290 mL    Free Water: 100 mL    IV PiggyBack: 100 mL    Jevity 1.5: 480 mL  Total IN: 970 mL    OUT:    Intermittent Catheterization - Urethral (mL): 4100 mL    Rectal Tube (mL): 300 mL  Total OUT: 4400 mL    Total NET: -3430 mL      24 Oct 2024 07:01  -  25 Oct 2024 00:40  --------------------------------------------------------  IN:    Enteral Tube Flush: 180 mL    Nepro with Carb Steady: 480 mL  Total IN: 660 mL    OUT:    Indwelling Catheter - Urethral (mL): 1404 mL  Total OUT: 1404 mL    Total NET: -744 mL        I&O's Summary    23 Oct 2024 07:01  -  24 Oct 2024 07:00  --------------------------------------------------------  IN: 970 mL / OUT: 4400 mL / NET: -3430 mL    24 Oct 2024 07:01  -  25 Oct 2024 00:40  --------------------------------------------------------  IN: 660 mL / OUT: 1404 mL / NET: -744 mL        PHYSICAL EXAM:  General: NAD, pt is comfortably resting in bed, unable to assess orientation status, [place] speaking   HEENT: tracking vertically only, attempts to track to voice (right > left), right pupil 1mm sluggish, left pupil 3mm briskly reactive, +L corneal, +cough/gag, face symmetric, attempts to stick out tongue to command   Cardiovascular: RRR, normal S1 and S2   Respiratory: lungs CTAB, no wheezing, rhonchi, or crackles, +ET tube (trach collar)   GI: normoactive BS to auscultation, abd soft, NTND, +PEG and abdominal binder   Neuro: no verbal output, wiggled toes to command intermittently on right foot, mimics 2 fingers with right hand, DARRELL/LL trace mvmt spont/noxious   unable to assess sensation     TUBES/LINES:  [] CVC  [] A-line  [] Lumbar Drain  [] Ventriculostomy  [] Other    DIET:  [] NPO  [] Mechanical  [X] Tube feeds      LABS:    Urinalysis Basic - ( 24 Oct 2024 05:29 )    Color: x / Appearance: x / SG: x / pH: x  Gluc: 108 mg/dL / Ketone: x  / Bili: x / Urobili: x   Blood: x / Protein: x / Nitrite: x   Leuk Esterase: x / RBC: x / WBC x   Sq Epi: x / Non Sq Epi: x / Bacteria: x          CAPILLARY BLOOD GLUCOSE      POCT Blood Glucose.: 95 mg/dL (24 Oct 2024 16:18)  POCT Blood Glucose.: 103 mg/dL (24 Oct 2024 11:07)      Drug Levels: [] N/A    CSF Analysis: [] N/A      Allergies    Allergy Status Unknown    Intolerances      MEDICATIONS:  Antibiotics:    Neuro:  acetaminophen   Oral Liquid .. 650 milliGRAM(s) Oral every 6 hours PRN  carbidopa/levodopa  25/100 1 Tablet(s) Oral <User Schedule>  oxyCODONE    Solution 5 milliGRAM(s) Oral every 4 hours PRN    Anticoagulation:  heparin   Injectable 5000 Unit(s) SubCutaneous every 8 hours    OTHER:  acetylcysteine 10%  Inhalation 4 milliLiter(s) Inhalation every 12 hours  albuterol/ipratropium for Nebulization 3 milliLiter(s) Nebulizer every 12 hours  amLODIPine   Tablet 10 milliGRAM(s) Oral daily  artificial tears (preservative free) Ophthalmic Solution 1 Drop(s) Right EYE every 2 hours  atropine 1% Ophthalmic Solution for SubLingual Use 2 Drop(s) SubLingual every 8 hours  chlorhexidine 2% Cloths 1 Application(s) Topical <User Schedule>  doxazosin 8 milliGRAM(s) Oral at bedtime  erythromycin   Ointment 1 Application(s) Right EYE every 4 hours  ofloxacin 0.3% Solution 1 Drop(s) Right EYE four times a day  petrolatum Ophthalmic Ointment 1 Application(s) Both EYES two times a day    IVF:  calcium acetate 667 milliGRAM(s) Oral three times a day with meals    CULTURES:  Culture Results:   Mixed gram negative rods including  Moderate Acinetobacter baumannii/nosocomialis group (10-16 @ 00:13)  Culture Results:   No growth at 5 days (10-15 @ 14:55)    RADIOLOGY & ADDITIONAL TESTS:      ASSESSMENT:  45 y/o PMH ?stroke/MI present to [facility] after collapsing at work. Decorticate posturing, vomiting, intubated for airway protection. 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 Transferred to [facility] for further management. s/p trach 10/14. s/p peg 10/21.        AMS    Handoff    Acute myocardial infarction    CVA (cerebral vascular accident)    Intracerebral hemorrhage of brain stem    Brainstem stroke    Brain stem stroke syndrome    Brain stem hemorrhage    Brain stem stroke syndrome    Hemorrhagic stroke    Brainstem stroke    Encephalopathy acute    Functional quadriplegia    Advanced care planning/counseling discussion    Encounter for palliative care    Percutaneous tracheal puncture    Altered mental status examination    EGD, with PEG    AMS    SysAdmin_VisitLink        PLAN:  Neuro:  - Neuro q4/vitals q2  - pain control: Tylenol prn, oxy prn  - CTH 9/30: enlarged pontine hemorrhage, CTH 10/3: read stable   - vEEG (10/3-4)- negative, (10/17-10/19) - negative  - stroke core measures, stroke neuro signed off  - MRI brain w/ w/o contrast 10/12: parenchymal hemorrhage, acute/subacute R cerebellar stroke    - neurostimulation: sinemet 25/100 BID    CV:  - -160  - HTN: amlodipine 10 mg qd  - echo (9/30) EF 75%    Resp:  - tolerating trach collar 40%  - duonebs/mucomyst q12h, sublingual atropine q8hrs for oral secretions, suctionig less frequent     GI:  - Nepro TF via PEG (placed 10/21 by gen surg)  - bowel reg held for loose stool, LBM 10/24  - rectal tube (10/23-    Renal:  - IVL, normonatremic goal  - hyperK 10/20: lokelma dc'd (10/21 -10/23)  - hyperPhos: phoslo 667mg TID (10/21 -)  - Urinary retenion: Cardura 8 mg   - gabriel (10/14-10/18), (10/19-22), gabreil replaced 10/24-   - CKD: trend Cr  - renal US 10/1: echogenicity c/w chronic med renal dz, repeat 10/8: inc renal echogeicity, c/f medical renal disease w increased hydro     Endo:  - A1c 5.4    Heme:  - DVT ppx: SCDs, SQH 5000u q8h     ID:  - 10/15 sputum +actinobacter baumanii, s/p unasyn (10/18-10/23)  - MRSA swab negative (10/2), sputum MSSA + , s/p 1 dose vanc (10/2), CTX (10/2 - 10/4), Ancef (10/4-10/14)     MISC:  - ophtho consult for keratitis, rec erythromycin ointment to rt eye q4hrs, ofloxacin ointment to rt eye QID, artificial tears to rt eye q2hrs, moisture chamber at bedtime    Dispo: NSICU, full code, pending placement and emergency medicaid     D/w Dr. D'Amico and Dr. Tomlin

## 2024-10-26 LAB
ANION GAP SERPL CALC-SCNC: 12 MMOL/L — SIGNIFICANT CHANGE UP (ref 5–17)
BUN SERPL-MCNC: 56 MG/DL — HIGH (ref 7–23)
CALCIUM SERPL-MCNC: 9.3 MG/DL — SIGNIFICANT CHANGE UP (ref 8.4–10.5)
CHLORIDE SERPL-SCNC: 110 MMOL/L — HIGH (ref 96–108)
CO2 SERPL-SCNC: 23 MMOL/L — SIGNIFICANT CHANGE UP (ref 22–31)
CREAT SERPL-MCNC: 3.21 MG/DL — HIGH (ref 0.5–1.3)
EGFR: 23 ML/MIN/1.73M2 — LOW
EGFR: 23 ML/MIN/1.73M2 — LOW
GLUCOSE SERPL-MCNC: 127 MG/DL — HIGH (ref 70–99)
HCT VFR BLD CALC: 35.7 % — LOW (ref 39–50)
HGB BLD-MCNC: 11.2 G/DL — LOW (ref 13–17)
MAGNESIUM SERPL-MCNC: 2.7 MG/DL — HIGH (ref 1.6–2.6)
MCHC RBC-ENTMCNC: 28.4 PG — SIGNIFICANT CHANGE UP (ref 27–34)
MCHC RBC-ENTMCNC: 31.4 GM/DL — LOW (ref 32–36)
MCV RBC AUTO: 90.4 FL — SIGNIFICANT CHANGE UP (ref 80–100)
NRBC # BLD: 0 /100 WBCS — SIGNIFICANT CHANGE UP (ref 0–0)
NRBC BLD-RTO: 0 /100 WBCS — SIGNIFICANT CHANGE UP (ref 0–0)
PHOSPHATE SERPL-MCNC: 4.9 MG/DL — HIGH (ref 2.5–4.5)
PLATELET # BLD AUTO: 326 K/UL — SIGNIFICANT CHANGE UP (ref 150–400)
POTASSIUM SERPL-MCNC: 4.7 MMOL/L — SIGNIFICANT CHANGE UP (ref 3.5–5.3)
POTASSIUM SERPL-SCNC: 4.7 MMOL/L — SIGNIFICANT CHANGE UP (ref 3.5–5.3)
RBC # BLD: 3.95 M/UL — LOW (ref 4.2–5.8)
RBC # FLD: 12.7 % — SIGNIFICANT CHANGE UP (ref 10.3–14.5)
SODIUM SERPL-SCNC: 145 MMOL/L — SIGNIFICANT CHANGE UP (ref 135–145)
WBC # BLD: 5.63 K/UL — SIGNIFICANT CHANGE UP (ref 3.8–10.5)
WBC # FLD AUTO: 5.63 K/UL — SIGNIFICANT CHANGE UP (ref 3.8–10.5)

## 2024-10-26 PROCEDURE — 99291 CRITICAL CARE FIRST HOUR: CPT

## 2024-10-26 PROCEDURE — 99231 SBSQ HOSP IP/OBS SF/LOW 25: CPT

## 2024-10-26 RX ORDER — SENNA 187 MG
2 TABLET ORAL AT BEDTIME
Refills: 0 | Status: DISCONTINUED | OUTPATIENT
Start: 2024-10-26 | End: 2024-10-27

## 2024-10-26 RX ORDER — ERYTHROMYCIN 5 MG/G
1 OINTMENT OPHTHALMIC EVERY 6 HOURS
Refills: 0 | Status: DISCONTINUED | OUTPATIENT
Start: 2024-10-26 | End: 2024-11-18

## 2024-10-26 RX ORDER — ATROPINE SULFATE 1 %
2 OINTMENT (GRAM) OPHTHALMIC (EYE) EVERY 12 HOURS
Refills: 0 | Status: DISCONTINUED | OUTPATIENT
Start: 2024-10-26 | End: 2024-10-27

## 2024-10-26 RX ADMIN — IPRATROPIUM BROMIDE AND ALBUTEROL SULFATE 3 MILLILITER(S): .5; 2.5 SOLUTION RESPIRATORY (INHALATION) at 17:41

## 2024-10-26 RX ADMIN — Medication 1 DROP(S): at 02:01

## 2024-10-26 RX ADMIN — Medication 1 DROP(S): at 14:30

## 2024-10-26 RX ADMIN — Medication 1 APPLICATION(S): at 17:30

## 2024-10-26 RX ADMIN — HEPARIN SODIUM 5000 UNIT(S): 1000 INJECTION INTRAVENOUS; SUBCUTANEOUS at 05:39

## 2024-10-26 RX ADMIN — IPRATROPIUM BROMIDE AND ALBUTEROL SULFATE 3 MILLILITER(S): .5; 2.5 SOLUTION RESPIRATORY (INHALATION) at 06:16

## 2024-10-26 RX ADMIN — OFLOXACIN 1 DROP(S): 3 SOLUTION OPHTHALMIC at 05:41

## 2024-10-26 RX ADMIN — Medication 1 APPLICATION(S): at 05:55

## 2024-10-26 RX ADMIN — Medication 1 DROP(S): at 11:00

## 2024-10-26 RX ADMIN — Medication 2 TABLET(S): at 23:20

## 2024-10-26 RX ADMIN — Medication 1 DROP(S): at 16:49

## 2024-10-26 RX ADMIN — Medication 1 DROP(S): at 08:00

## 2024-10-26 RX ADMIN — Medication 667 MILLIGRAM(S): at 08:00

## 2024-10-26 RX ADMIN — Medication 2 DROP(S): at 02:57

## 2024-10-26 RX ADMIN — SODIUM ZIRCONIUM CYCLOSILICATE 5 GRAM(S): 5 POWDER, FOR SUSPENSION ORAL at 11:52

## 2024-10-26 RX ADMIN — Medication 1 DROP(S): at 04:25

## 2024-10-26 RX ADMIN — ACETYLCYSTEINE 4 MILLILITER(S): 200 INHALANT RESPIRATORY (INHALATION) at 06:16

## 2024-10-26 RX ADMIN — ACETYLCYSTEINE 4 MILLILITER(S): 200 INHALANT RESPIRATORY (INHALATION) at 17:41

## 2024-10-26 RX ADMIN — DOXAZOSIN MESYLATE 8 MILLIGRAM(S): 8 TABLET ORAL at 22:36

## 2024-10-26 RX ADMIN — ERYTHROMYCIN 1 APPLICATION(S): 5 OINTMENT OPHTHALMIC at 12:08

## 2024-10-26 RX ADMIN — Medication 1 DROP(S): at 00:36

## 2024-10-26 RX ADMIN — Medication 667 MILLIGRAM(S): at 17:21

## 2024-10-26 RX ADMIN — OFLOXACIN 1 DROP(S): 3 SOLUTION OPHTHALMIC at 12:08

## 2024-10-26 RX ADMIN — ERYTHROMYCIN 1 APPLICATION(S): 5 OINTMENT OPHTHALMIC at 05:41

## 2024-10-26 RX ADMIN — Medication 2 DROP(S): at 17:22

## 2024-10-26 RX ADMIN — ERYTHROMYCIN 1 APPLICATION(S): 5 OINTMENT OPHTHALMIC at 17:22

## 2024-10-26 RX ADMIN — Medication 1 DROP(S): at 11:52

## 2024-10-26 RX ADMIN — OFLOXACIN 1 DROP(S): 3 SOLUTION OPHTHALMIC at 00:35

## 2024-10-26 RX ADMIN — Medication 1 APPLICATION(S): at 05:41

## 2024-10-26 RX ADMIN — HEPARIN SODIUM 5000 UNIT(S): 1000 INJECTION INTRAVENOUS; SUBCUTANEOUS at 14:29

## 2024-10-26 RX ADMIN — AMLODIPINE BESYLATE 10 MILLIGRAM(S): 10 TABLET ORAL at 05:40

## 2024-10-26 RX ADMIN — Medication 1 DROP(S): at 17:21

## 2024-10-26 RX ADMIN — Medication 1 DROP(S): at 22:36

## 2024-10-26 RX ADMIN — ERYTHROMYCIN 1 APPLICATION(S): 5 OINTMENT OPHTHALMIC at 02:00

## 2024-10-26 RX ADMIN — Medication 667 MILLIGRAM(S): at 11:52

## 2024-10-26 RX ADMIN — Medication 1 DROP(S): at 05:40

## 2024-10-26 RX ADMIN — Medication 1 DROP(S): at 19:21

## 2024-10-26 RX ADMIN — HEPARIN SODIUM 5000 UNIT(S): 1000 INJECTION INTRAVENOUS; SUBCUTANEOUS at 22:35

## 2024-10-26 RX ADMIN — OFLOXACIN 1 DROP(S): 3 SOLUTION OPHTHALMIC at 17:22

## 2024-10-26 NOTE — PROGRESS NOTE ADULT - ASSESSMENT
44y/M with  large pontine hemorrhage, SAH, brain edema; ?locked-in  CVA  acute MI  acute on chronic CKD, hydronephrosis    PLAN:   NEURO: neurochecks q4h, VSq4 PRN pain meds with oxycodone   neurostim: d/c sinemet  palliative / ethics follow-up  off VEEG - neg  REHAB:  physical therapy evaluation and management    EARLY MOB:  bedrest HOB up    PULM:  trach collar 35%; pulmo toilette, nebs, atropine SL  CARDIO:  SBP goal 100-160mm Hg,  amlodipine, EF 75%  ENDO:  Blood sugar goals 140-180 mg/dL, A1C 5.4, ISS  GI:  off PPI s/p PEG,  DIET: TF nepro  RENAL:  creatinine increased to 3.3, plasmalyte 500cc x1, recheck BMp this afgernoon ; Na goal 135-145, Vuong, oxazosin 8;  continue phosphate binder off sodium zirconium; TOV in 3 days  HEM/ONC:  Hb stable  VTE Prophylaxis: SCDs, SQH  ID: afebrile, no leukocytosis; on Unasyn for A. baumanii, MSSA+ (last day 10/23)  Social: will update family  MISC: palliative discussions with family; ofloxacin and erythromycin to R eye    Active issues:  What's keeping patient in the ICU? respiratory status  What is this patient's dispo plan? wean to trach collar and stepdown vs if unable to - will stepdown to vent bed    ATTENDING ATTESTATION:  I was physically present for the key portions of the evaluation and management (E/M) service provided.  I agree with the above history, physical and plan, which I have reviewed and edited where appropriate.    Patient at high risk for neurological deterioration or death due to:  ICU delirium, aspiration PNA, DVT / PE.  Critical care time:  I have personally provided 60 minutes of critical care time, excluding time spent on separate procedures.      Plan discussed with RN, house staff. 44y/M with  large pontine hemorrhage, SAH, brain edema; ?locked-in  CVA  acute MI  acute on chronic CKD, hydronephrosis    PLAN:   NEURO: neurochecks q2h, VSq2 PRN pain meds with oxycodone   neurostim: off   palliative / ethics follow-up  off VEEG - neg  REHAB:  physical therapy evaluation and management    EARLY MOB:  bedrest HOB up    PULM:  trach collar 35%; pulmo toilette, nebs, atropine SL - taper  CARDIO:  SBP goal 100-160mm Hg,  amlodipine, EF 75%  ENDO:  Blood sugar goals 140-180 mg/dL, A1C 5.4, ISS  GI:  off PPI s/p PEG,  DIET: TF nepro  RENAL:  stable creatinine; Na goal 135-145, Vuong, doxazosin 8;  continue phosphate binder sodium zirconium; TOV on Mon  HEM/ONC:  Hb stable  VTE Prophylaxis: SCDs, SQH  ID: afebrile, no leukocytosis; off Abx  Social: will update family  MISC: palliative discussions with family; ofloxacin and erythromycin to R eye    Active issues:  What's keeping patient in the ICU? respiratory status, anisocoria  What is this patient's dispo plan? wean to trach collar and stepdown vs if unable to - will stepdown to vent bed    ATTENDING ATTESTATION:  I was physically present for the key portions of the evaluation and management (E/M) service provided.  I agree with the above history, physical and plan, which I have reviewed and edited where appropriate.    Patient at high risk for neurological deterioration or death due to:  ICU delirium, aspiration PNA, DVT / PE.  Critical care time:  I have personally provided 60 minutes of critical care time, excluding time spent on separate procedures.      Plan discussed with RN, house staff. 44y/M with  large pontine hemorrhage, SAH, brain edema; ?locked-in  CVA  history of MI  acute on chronic CKD, hydronephrosis    PLAN:   NEURO: neurochecks q2h, VSq2 PRN pain meds with oxycodone   neurostim: off   palliative / ethics follow-up  off VEEG - neg  REHAB:  physical therapy evaluation and management    EARLY MOB:  bedrest HOB up    PULM:  trach collar 35%; pulmo toilette, nebs, atropine SL - taper  CARDIO:  SBP goal 100-160mm Hg,  amlodipine, EF 75%  ENDO:  Blood sugar goals 140-180 mg/dL, A1C 5.4, ISS  GI:  off PPI s/p PEG,  DIET: TF nepro  RENAL:  stable creatinine; Na goal 135-145, Vuong, doxazosin 8;  continue phosphate binder sodium zirconium; TOV on Mon  HEM/ONC:  Hb stable  VTE Prophylaxis: SCDs, SQH  ID: afebrile, no leukocytosis; off Abx  Social: will update family  MISC: palliative discussions with family; ofloxacin and erythromycin to R eye    Active issues:  What's keeping patient in the ICU? respiratory status, anisocoria  What is this patient's dispo plan? wean to trach collar and stepdown vs if unable to - will stepdown to vent bed    ATTENDING ATTESTATION:  I was physically present for the key portions of the evaluation and management (E/M) service provided.  I agree with the above history, physical and plan, which I have reviewed and edited where appropriate.    Patient at high risk for neurological deterioration or death due to:  ICU delirium, aspiration PNA, DVT / PE.  Critical care time:  I have personally provided 60 minutes of critical care time, excluding time spent on separate procedures.      Plan discussed with RN, house staff.

## 2024-10-26 NOTE — PROGRESS NOTE ADULT - SUBJECTIVE AND OBJECTIVE BOX
HPI:  43 yo male, unknown past medical history (reported stroke and MI by coworkers) presented to [facility] with AMS, Pt was working at [employer] when he was found down by coworkers. EMS called and pt brought to [facility] ED. Intubated, sedated, started on cardene for SBPs in 200s. CT head showed brain stem bleed. (NIHSS 33, ICH score 3).  Transferred to NSICU for further management.     Hospital Course:   9/30: transferred from [facility]. A line placed. Versed dc'd. Cy Rader at bedside, states pt has family in [place], cannot confirm medications or PMH other than stroke and MI. 250cc bolus 3% given. LR switched to NS. hydralazine 25q8 started, 3% started, switched propofol to precedex   10/1: stability CTH done. Added labetalol, started TF. Palliative consulted. ethics consulted to determine surrogate. febrile 103, pan cx sent  10/2: BD 2, GEORGE overnight. TF resumed. Desatt'd to 80s, FiO2 inc. to 50. Fentanyl given, ABG, CXR ordered. Maxxed on precedex, started on propofol for DARIEN -4 - -5. Precedex dc'd. Duonebs, mucomyst, hypertonic added. 3% dc'd. Cardene dc'd. Start vanc/CTX. Increased labetalol 200q8. MRSA negative, dc'd vanc. ETT pulled back 2cm x 2, good positioning after confirmatory chest xray. Ethics attempting to establish HCP with family. Na 159, starting FW 250q6 for range 150-155.   10/3: BD3, GEORGE o/n, neuro stable. Na elevating, FW increased to 300q6. Dc'd bowel reg for diarrhea. vEEG started. SQH 5000q8 tonight.   10/4: BD 4, albumn bolus, incr. LR to 80 2/2 incr. in Cr, LR to 100cc/hr for uptrending Cr. Started 7% hypersal for 48hrs and SL atropine for inline/oral thick secretions. Dc'd CTX and started ancef for MSSA in the sputum. Nephrology consulted for CKD, f/u recs. SBP 170s, given hydralazine 10mg IVP.   10/5: BD5, o/n 10mg IVP hydralazine given for SBP 170s and started on hydralazine 25q8 via OGT. 10mg IV push labetalol for SBP > 160s. RT placed for diarrhea.   10/6: BD6, o/n FW increased to 350q4 per nephrology recs. IV tylenol for temp 100.6, SBp 160s presumed uncomfortable.   10/7: BD7, overnight pancultured for temp 101.8F.   10/8: BD8. GEORGE. Cr bumped. decreased LR to 75cc/hr. Adding simethicone ATC. incr hydralazine 50mgTID. Incr labetalol 300mgTID. Na 145, decreased FWF to 250q6. Start precedex. FENa consistent with intrinsic kidney injury. Pend repeat renal US. Retaining up to 1.3L, bladder scans q6, straight cath PRN  10/9: BD 9. GEORGE overnight. Neuro stable. abd xray for distention w non-specific gas pattern, OGT to LIWS for morning. duonebs/mucomyst to q8 for improving secretions. Changed tube feeds to Jevity 1.5 20cc/hr, low rate due to abdominal distention, nepro dense and more difficult to digest. Tolerating CPAP, confirmed by ABG.   10/10: BD 10. GEORGE overnight. Neuro stable. (+) gabriel for urinary retention on bladder scan. inc TF to goal rate of 40cc/hr. family leaning toward pursuing trach/PEG. 1/2 amp for FS 81.   10/11: BD 11. GEORGE overnight. Neuro stable. Trach/PEG consults placed.   10/12: BD 12. GEORGE overnight. Neuro stable. MRI brain complete.   10/13: BD 13. Increase flomax. Hold SQH after PM dose for trach tm. IVL.   10/14: BD 14. GEORGE overnight, remains on AC/VC. Gabriel placed for urinary retention. Dc'd free water.  S/p trach with pulm. NGT placed and CXR confirmed in good position.   10/15: BD 15, GEORGE ovn. resumed feeds. spiked 101, pan cx sent.   10/16: BD 16. GEORGE ovn. Lokelma 5mg for K+ 5.4. Started vanc q 24/zosyn for empiric PNA coverage, IVF to 100/hr. PEG held for fever.   10/17: BD 17,  ordered serum osm and urine osm for am. Started sinemet for neurostimulation. Increased cardura to 0.8. Started FW 100q4, dc'd IVF. MRSA negative, dc'd vanc. NGT replaced d/t coiling.   10/18: BD 18, GOERGE overnight, neuro stable. Amantadine added for neurostim. zosyn changed to unasyn for acinetobacter baumannii, failed TOV and required SC  10/19: BD 19, GEORGE ovn. cardura 2mg added for retention. labetalol decreased 200q8, hydralazine decreased 25q8. Gabriel replaced.   10/20: BD20, GEORGE overnight. NGT dislodged, replaced. PEG tomorrow w/ gen surg, FW increased to 150q4 and labetalol decreased to 100q8, lokelma given for hyperkalemia.   10/21: BD 21. POD0 PEG placement with Gen surg. decr labetolol to 50q8, incr. cardura to 0.4, started lokelma and phoslo, dc gabriel POD0 PEG placement with Gen surg.  10/22: BD 22. Plan to start TF today via PEG. dc labetalol, Following ophtho recs. Increased apnea settings - found to be in cheyne-moe respiration. CPAP 5/5.  10/23: BD 23. hydralazine d/c'd, trach collar trial today. Rectal tube placed at 6am.  10/24: BD24, o/n lokelma held due to diarrhea. Free water 100q6 resumed. dc'd tamsulosin, amantadine. Incr'd cardura to 8mg qhs. Dc'd FW. Switched jevity to nepro. gabriel placed for high urine output. Started SL atropine for oral secretions. Dc'd free water.  10/25: BD25, o/n decreased suctioning requirements to > q4hrs, GEORGE. Cr improving, cont phoslo, lokelma held at this time. Gabriel placed yest, cont. Tolerating trach collar. Given 500cc plasmalyte bolus for ANIKA. Dc'd sinemet.   10/26: BD26, o/n resumed lokelma 5mg daily and resumed 100cc free water q6hrs. Change in neuro status with new right pupillary dilation with anisocoria (right pupil 6mm fixed and left pupil 3mm briskly reactive). Given 23.4% NaCl bullet, taken for emergent CTH showing mostly resolved pontine hemorrhage, continued brainstem hypodensity likely edema d/t hemorrhage, no new hemorrhage or infarct, no herniation, mild increase in size of left lateral ventricle. Vitals remaine stable. Na goal > 140.     Vital Signs Last 24 Hrs  T(C): 36.7 (26 Oct 2024 00:38), Max: 37.9 (25 Oct 2024 05:26)  T(F): 98.1 (26 Oct 2024 00:38), Max: 100.2 (25 Oct 2024 05:26)  HR: 70 (26 Oct 2024 02:00) (70 - 86)  BP: 134/90 (26 Oct 2024 02:00) (117/82 - 152/103)  BP(mean): 107 (26 Oct 2024 02:00) (95 - 122)  RR: 11 (26 Oct 2024 02:00) (9 - 16)  SpO2: 100% (26 Oct 2024 02:00) (96% - 100%)    Parameters below as of 26 Oct 2024 02:00  Patient On (Oxygen Delivery Method): tracheostomy collar  O2 Flow (L/min): 10  O2 Concentration (%): 35    I&O's Detail    24 Oct 2024 07:01  -  25 Oct 2024 07:00  --------------------------------------------------------  IN:    Enteral Tube Flush: 180 mL    Nepro with Carb Steady: 680 mL  Total IN: 860 mL    OUT:    Indwelling Catheter - Urethral (mL): 1884 mL    Rectal Tube (mL): 250 mL  Total OUT: 2134 mL    Total NET: -1274 mL      25 Oct 2024 07:01  -  26 Oct 2024 03:06  --------------------------------------------------------  IN:    Enteral Tube Flush: 60 mL    multiple electrolytes Injection Type 1 Bolus: 500 mL    multiple electrolytes Injection Type 1.: 480 mL    Nepro with Carb Steady: 810 mL  Total IN: 1850 mL    OUT:    Intermittent Catheterization - Urethral (mL): 1100 mL    Rectal Tube (mL): 150 mL  Total OUT: 1250 mL    Total NET: 600 mL        I&O's Summary    24 Oct 2024 07:01  -  25 Oct 2024 07:00  --------------------------------------------------------  IN: 860 mL / OUT: 2134 mL / NET: -1274 mL    25 Oct 2024 07:01  -  26 Oct 2024 03:06  --------------------------------------------------------  IN: 1850 mL / OUT: 1250 mL / NET: 600 mL        PHYSICAL EXAM:  General: NAD, pt is comfortably resting in bed, unable to assess orientation status, English speaking   HEENT: tracking vertically only, attempts to track to voice (right > left), right pupil 6mm non-reactive, left pupil 3mm briskly reactive, +L corneal, +cough/gag, face symmetric, attempts to stick out tongue to command   Cardiovascular: RRR, normal S1 and S2   Respiratory: lungs CTAB, no wheezing, rhonchi, or crackles, +ET tube (trach collar)   GI: normoactive BS to auscultation, abd soft, NTND, +PEG and abdominal binder   Neuro: no verbal output, wiggled toes to command intermittently on right foot, mimics 2 fingers with right hand, DARRELL/LL trace mvmt spont/noxious   unable to assess sensation     TUBES/LINES:  [] CVC  [] A-line  [] Lumbar Drain  [] Ventriculostomy  [] Other    DIET:  [] NPO  [] Mechanical  [X] Tube feeds        LABS:    Urinalysis Basic - ( 25 Oct 2024 21:33 )    Color: x / Appearance: x / SG: x / pH: x  Gluc: 93 mg/dL / Ketone: x  / Bili: x / Urobili: x   Blood: x / Protein: x / Nitrite: x   Leuk Esterase: x / RBC: x / WBC x   Sq Epi: x / Non Sq Epi: x / Bacteria: x          CAPILLARY BLOOD GLUCOSE          Drug Levels: [] N/A    CSF Analysis: [] N/A      Allergies    Allergy Status Unknown    Intolerances      MEDICATIONS:  Antibiotics:    Neuro:  acetaminophen   Oral Liquid .. 650 milliGRAM(s) Oral every 6 hours PRN  oxyCODONE    Solution 5 milliGRAM(s) Oral every 4 hours PRN    Anticoagulation:  heparin   Injectable 5000 Unit(s) SubCutaneous every 8 hours    OTHER:  acetylcysteine 10%  Inhalation 4 milliLiter(s) Inhalation every 12 hours  albuterol/ipratropium for Nebulization 3 milliLiter(s) Nebulizer every 12 hours  amLODIPine   Tablet 10 milliGRAM(s) Oral daily  artificial tears (preservative free) Ophthalmic Solution 1 Drop(s) Right EYE every 2 hours  atropine 1% Ophthalmic Solution for SubLingual Use 2 Drop(s) SubLingual every 8 hours  chlorhexidine 2% Cloths 1 Application(s) Topical <User Schedule>  doxazosin 8 milliGRAM(s) Oral at bedtime  erythromycin   Ointment 1 Application(s) Right EYE every 4 hours  ofloxacin 0.3% Solution 1 Drop(s) Right EYE four times a day  petrolatum Ophthalmic Ointment 1 Application(s) Both EYES two times a day  sodium zirconium cyclosilicate 5 Gram(s) Oral daily    IVF:  calcium acetate 667 milliGRAM(s) Oral three times a day with meals    CULTURES:  Culture Results:   Mixed gram negative rods including  Moderate Acinetobacter baumannii/nosocomialis group (10-16 @ 00:13)  Culture Results:   No growth at 5 days (10-15 @ 14:55)    RADIOLOGY & ADDITIONAL TESTS:  < from: CT Head No Cont (10.25.24 @ 21:18) >  IMPRESSION:    1.  Expected evolution of a parenchymal hemorrhage in the nabil and   subacute infarction in the cerebellum.  2.  No interval rebleeding.  3.  Worsened opacification of the right mastoid.        ASSESSMENT:  43 y/o PMH ?stroke/MI present to [facility] after collapsing at work. Decorticate posturing, vomiting, intubated for airway protection. 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 Transferred to [facility] for further management. s/p trach 10/14. s/p peg 10/21.        AMS    Handoff    Acute myocardial infarction    CVA (cerebral vascular accident)    Intracerebral hemorrhage of brain stem    Brainstem stroke    Brain stem stroke syndrome    Brain stem hemorrhage    Brain stem stroke syndrome    Hemorrhagic stroke    Brainstem stroke    Encephalopathy acute    Functional quadriplegia    Advanced care planning/counseling discussion    Encounter for palliative care    Percutaneous tracheal puncture    Altered mental status examination    EGD, with PEG    AMS    [email or web address]        PLAN:  Neuro:  - Neuro q1/vitals q1hr   - pain control: Tylenol prn, oxy prn  - CTH 9/30: enlarged pontine hemorrhage, CTH 10/3: read stable, CTH 10/25 anisocoric pupils (R sluggish 6mm compared to left 3mm briskly reactive); showing mostly resolved pontine hemorrhage, continued brainstem hypodensity likely edema d/t hemorrhage, no new hemorrhage or infarct, no herniation, mild increase in size of left lateral ventricle  - vEEG (10/3-4)- negative, (10/17-10/19) - negative  - stroke core measures, stroke neuro signed off  - MRI brain w/ w/o contrast 10/12: parenchymal hemorrhage, acute/subacute R cerebellar stroke    - sinemet stopped 10/25     CV:  - -160  - HTN: amlodipine 10 mg qd  - echo (9/30) EF 75%    Resp:  - tolerating trach collar 40%  - duonebs/mucomyst q12h, sublingual atropine q8hrs for oral secretions, suctionig less frequent     GI:  - Nepro TF via PEG (placed 10/21 by gen surg)  - bowel reg held for loose stool  - rectal tube (10/23- ) --> plan to d/c     Renal:  - Plasmalyte @80cc/hr d/c'd, resuming FW flushes 100cc q6hrs   - hyperK 10/20: lokelma dc'd (10/21 -10/23), resumed 10/25 for hyperkalemia   - hyperPhos: phoslo 667mg TID (10/21 -)  - Urinary retenion: Cardura 8 mg   - gabriel (10/14-10/18), (10/19-22), gabriel replaced 10/24-   - CKD: trend Cr  - renal US 10/1: echogenicity c/w chronic med renal dz, repeat 10/8: inc renal echogeicity, c/f medical renal disease w increased hydro     Endo:  - A1c 5.4    Heme:  - DVT ppx: SCDs, SQH 5000u q8h     ID:  - 10/15 sputum +actinobacter baumanii, s/p unasyn (10/18-10/23)  - MRSA swab negative (10/2), sputum MSSA + , s/p 1 dose vanc (10/2), CTX (10/2 - 10/4), Ancef (10/4-10/14)     MISC:  - ophtho consult for keratitis, rec erythromycin ointment to rt eye q4hrs, ofloxacin ointment to rt eye QID, artificial tears to rt eye q2hrs, moisture chamber at bedtime    Dispo: NSICU, full code, pending placement and emergency medicaid     D/w Dr. D'Amico and Dr. Tomlin

## 2024-10-26 NOTE — PROGRESS NOTE ADULT - SUBJECTIVE AND OBJECTIVE BOX
=================================  NEUROCRITICAL CARE ATTENDING NOTE  =================================    GARETT DELEON   MRN-[id number]  Summary:  44y/M  unknown past medical history (reported stroke and MI by coworkers) presented to [facility] with AMS, Pt was working at [employer] when he was found down by coworkers. EMS called and pt brought to [facility] ED. Intubated, sedated, started on cardene for SBPs in 200s. CT head showed brain stem bleed. Transferred to NSICU for further management.  (30 Sep 2024 12:55)    COURSE IN THE HOSPITAL:  Date	POD	BD	Events	Surgery  	-	-	Transferred from [facility]. A-line placed. Versed discontinued. Hydralazine, 3% saline started. Changed propofol to precedex.	-  10/1	-	-	Stability CTH done. Added labetalol, started TF. Palliative and ethics consulted. Febrile 103°F, pan cultures sent.	-  10/2	-		Desaturation to 80s, FiO2 increased. Fentanyl given, ABG, CXR ordered. Propofol started. Vanc/CTX started. MRSA negative, vanc discontinued. Na 159, starting FW 250q6.	-  10/3	-	3	Na increasing, FW increased to 300q6. Bowel regimen discontinued. vEEG started.	-  10/4	-		Albumin bolus, increased LR. 7% hypersal started for 48hrs. Nephrology consulted for CKD. SBP 170s, hydralazine given.	-  10/5	-		Hydralazine and labetalol for SBP. RT placed for diarrhea.	-  10/6	-		FW increased to 350q4. IV Tylenol for temp 100.6°F.	-  10/7	-		Pancultured for temp 101.8°F.	-  10/8	-		Cr increase, LR decreased. Simethicone added. Increased hydralazine, labetalol. Na 145, FW 250q6. Retaining fluid, bladder scans ordered.	-  10/9	-		Abd x-ray for distention. Changed tube feeds to Jevity 1.5. Tolerating CPAP.	-  10/10	-	10	Vuong for urinary retention. Increased TF to goal. Family leaning toward trach/PEG.	-  10/11	-	11	Trach/PEG consults placed.	-  10/12	-	12	MRI brain complete.	-  10/13	-	13	Increased flomax. Hold SQH for trach.	-  10/14	-		S/p trach with pulmonary. NGT placed, CXR confirmed position.	-  10/15	-	15	Resumed feeds. Spiked fever 101°F, pan cultures sent.	-  10/16	-	16	Lokelma for K+ 5.4. Started vanc/zosyn for PNA. PEG held for fever.	-  10/17	-	17	Serum and urine osm ordered. Started sinemet, FW 100q4, IVF discontinued. MRSA negative, vanc discontinued. NGT replaced.	-  10/18	-	18	Amantadine added. Changed zosyn to unasyn for acinetobacter baumannii. Failed TOV, required SC.	-  10/19	-	19	Cardura added for retention. Decreased labetalol, hydralazine. Vuong replaced.	-  10/20	-	20	NGT dislodged, replaced. PEG scheduled for tomorrow. Lokelma for hyperkalemia.  10/21	-	21	s/p PEG  10/22	-	22	No significant events overnight. 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  trach collar 40%  10/24   -	24	No significant events overnight.   10/25   -	25	No significant events overnight.   10/26   -	26	Anisocoria overnight, given 23.4% x1, CT stable; briefly back on vent, ABG unremarkable, now back on trach collar    Past Medical History: Acute myocardial infarction CVA (cerebral vascular accident)  Allergies:  Allergy Status Unknown  Home meds:     PHYSICAL EXAMINATION  T(C): 36.6 (10-26-24 @ 04:34), Max: 37.7 (10-25-24 @ 09:23) HR: 67 (10-26-24 @ 07:00) (63 - 85) BP: 160/104 (10-26-24 @ 07:00) (117/82 - 160/104) RR: 10 (10-26-24 @ 07:00) (9 - 20) SpO2: 100% (10-26-24 @ 07:00) (96% - 100%)  NEUROLOGIC EXAMINATION:  Patient is B eye opens spontaneously, R2 L3 brisk, (+) bilateral corneals, not FC, tracking with eyes up and down, R UE localizes, B LE withdraws, L UE trace movements, not mimicking; reflexive hand   GENERAL: trach collar 40%  EENT:  anicteric, R eye erythematous  CARDIOVASCULAR: (+) S1 S2, normal rate and regular rhythm  PULMONARY: clear to auscultation bilaterally (q4h suctioning oral secretions, coughing sputum from trach)  ABDOMEN: soft, nontender with normoactive bowel sounds  EXTREMITIES: no edema  SKIN: no rash    LABS: 10-26  CAPILLARY BLOOD GLUCOSE                              11.2   5.63  )-----------( 326      ( 26 Oct 2024 05:30 )             35.7     WBC: 5.63 (10-26)  6.21 (10-25)   Hb: 11.2 (10-26)  11.0 (10-25)   Plt: 326 (10-26)  368 (10-25)    10-26    145  |  110[H]  |  56[H]  ----------------------------<  127[H]  4.7   |  23  |  3.21[H]    Ca    9.3      26 Oct 2024 05:30  Phos  4.9     10-26  Mg     2.7     10-26        Na:145 (10-26 @ 05:30)  144 (10-25 @ 21:33)  142 (10-25 @ 15:32)  141 (10-25 @ 06:15)  138 (10-24 @ 05:29)   BUN: 56 (10-26)  53 (10-25)  52 (10-25)  47 (10-25)  42 (10-24)   Crea:3.21 (10-26)  3.27 (10-25)  3.38 (10-25)  3.21 (10-25)  2.82 (10-24)        10-25 @ 07:01  -  10-26 @ 07:00  --------------------------------------------------------  IN: 2335 mL / OUT: 1755 mL / NET: 580 mL    10-26 @ 07:01  -  10-26 @ 07:40  --------------------------------------------------------  IN: 45 mL / OUT: 0 mL / NET: 45 mL        LABS: 10-25  CAPILLARY BLOOD GLUCOSE 95 103               11.0 (10.5)  6.21  )-----------( 368      ( 25 Oct 2024 06:15 )             34.8      141  |  107  |  47[H]  ----------------------------<  105[H]  5.4[H]   |  20[L]  |  3.21[H]    Ca    9.4      25 Oct 2024 06:15  Phos  5.0     10-25  Mg     2.5     10-25    10-24 @ 07:01  -  10-25 @ 07:00  --------------------------------------------------------  IN: 860 mL / OUT: 2134 mL / NET: -1274 mL    ABG - ( 23 Oct 2024 23:00 ) pH, Arterial: 7.33  pH, Blood: x     /  pCO2: 43    /  pO2: 99    / HCO3: 23    / Base Excess: -3.2  /  SaO2: 98.5   - mild respiratory acidosis     Bacteriology:  10/16 sputum culture acinetobacter  10/15 Blood CS [id number]    CSF studies:  EEG:  [id number] 9:30 AM	CT Brain	Large pontine hemorrhage, SAH, edema, no hydrocephalus. Multiple old infarcts.  2024 1:11 PM	CTA Head/Neck	Pontine hemorrhage stable, no malformation, stable ventricular size, no stenosis or aneurysm.  2024 6:40 PM	CT Brain	Enlarged pontine hemorrhage, SAH in frontal lobes.  2024        	CT Brain	Stable pontine hemorrhage, SAH stable.  10/03/2024 11:06 AM	CT Brain	Stable hematoma, no rebleeding.  10/12/2024 9:16 AM	MRI Brain	Stable hemorrhage, no new bleeding, cerebellar infarct.    Other imaging:  Date/Time	Type of Study	Results  10/01/2024 11:49 AM	US Retroperit	No hydronephrosis; chronic renal disease; Vuong catheter.  10/01/2024 11:14 AM	XR Abdomen	Orogastric tube coiled in stomach; nonspecific bowel gas pattern.  10/07/2024 10:25 PM	XR Abdomen	Nonspecific bowel gas pattern.  10/08/2024 8:01 AM	US Retroperit	Normal kidneys; distended bladder; mild hydronephrosis.  10/10/2024 7:32 AM	XR Abdomen	Nasogastric tube; nonspecific bowel gas pattern.  10/19/2024 1:13 PM	US DPLX Veins	No DVT in lower extremities.    MEDICATIONS: 10-26    ·	heparin   Injectable 5000 SubCutaneous every 8 hours  ·	amLODIPine   Tablet 10 milliGRAM(s) Oral daily  ·	doxazosin 8 milliGRAM(s) Oral at bedtime  ·	acetylcysteine 10%  Inhalation 4 Inhalation every 12 hours  ·	albuterol/ipratropium for Nebulization 3 Nebulizer every 12 hours  ·	artificial tears (preservative free) Ophthalmic Solution 1 Right EYE every 2 hours  ·	atropine 1% Ophthalmic Solution for SubLingual Use 2 SubLingual every 8 hours  ·	calcium acetate 667 Oral three times a day with meals  ·	erythromycin   Ointment 1 Right EYE every 4 hours  ·	ofloxacin 0.3% Solution 1 Right EYE four times a day  ·	petrolatum Ophthalmic Ointment 1 Both EYES two times a day  ·	sodium zirconium cyclosilicate 5 Oral daily  ·	acetaminophen   Oral Liquid .. 650 Oral every 6 hours PRN  ·	oxyCODONE    Solution 5 Oral every 4 hours PRN     IV FLUIDS:   DRIPS:  DIET: Nepro @ 40cc/hr  Lines:   Drains:   Vuong (inserted 10/24)  Wounds:    CODE STATUS:  Full Code                       GOALS OF CARE:  aggressive                      DISPOSITION:  ICU  ICH Score 3 =================================  NEUROCRITICAL CARE ATTENDING NOTE  =================================    GARETT DELEON   MRN-[id number]  Summary:  44y/M  unknown past medical history (reported stroke and MI by coworkers) presented to [facility] with AMS, Pt was working at [employer] when he was found down by coworkers. EMS called and pt brought to [facility] ED. Intubated, sedated, started on cardene for SBPs in 200s. CT head showed brain stem bleed. Transferred to NSICU for further management.  (30 Sep 2024 12:55)    COURSE IN THE HOSPITAL:  Date	POD	BD	Events	Surgery  	-	-	Transferred from [facility]. A-line placed. Versed discontinued. Hydralazine, 3% saline started. Changed propofol to precedex.	-  10/1	-	-	Stability CTH done. Added labetalol, started TF. Palliative and ethics consulted. Febrile 103°F, pan cultures sent.	-  10/2	-		Desaturation to 80s, FiO2 increased. Fentanyl given, ABG, CXR ordered. Propofol started. Vanc/CTX started. MRSA negative, vanc discontinued. Na 159, starting FW 250q6.	-  10/3	-	3	Na increasing, FW increased to 300q6. Bowel regimen discontinued. vEEG started.	-  10/4	-		Albumin bolus, increased LR. 7% hypersal started for 48hrs. Nephrology consulted for CKD. SBP 170s, hydralazine given.	-  10/5	-		Hydralazine and labetalol for SBP. RT placed for diarrhea.	-  10/6	-		FW increased to 350q4. IV Tylenol for temp 100.6°F.	-  10/7	-		Pancultured for temp 101.8°F.	-  10/8	-		Cr increase, LR decreased. Simethicone added. Increased hydralazine, labetalol. Na 145, FW 250q6. Retaining fluid, bladder scans ordered.	-  10/9	-		Abd x-ray for distention. Changed tube feeds to Jevity 1.5. Tolerating CPAP.	-  10/10	-	10	Vuong for urinary retention. Increased TF to goal. Family leaning toward trach/PEG.	-  10/11	-	11	Trach/PEG consults placed.	-  10/12	-	12	MRI brain complete.	-  10/13	-	13	Increased flomax. Hold SQH for trach.	-  10/14	-		S/p trach with pulmonary. NGT placed, CXR confirmed position.	-  10/15	-	15	Resumed feeds. Spiked fever 101°F, pan cultures sent.	-  10/16	-	16	Lokelma for K+ 5.4. Started vanc/zosyn for PNA. PEG held for fever.	-  10/17	-	17	Serum and urine osm ordered. Started sinemet, FW 100q4, IVF discontinued. MRSA negative, vanc discontinued. NGT replaced.	-  10/18	-	18	Amantadine added. Changed zosyn to unasyn for acinetobacter baumannii. Failed TOV, required SC.	-  10/19	-	19	Cardura added for retention. Decreased labetalol, hydralazine. Vuong replaced.	-  10/20	-	20	NGT dislodged, replaced. PEG scheduled for tomorrow. Lokelma for hyperkalemia.  10/21	-	21	s/p PEG  10/22	-	22	No significant events overnight. 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  trach collar 40%  10/24   -	24	No significant events overnight.   10/25   -	25	No significant events overnight.   10/26   -	26	Anisocoria overnight, given 23.4% x1, CT stable; briefly back on vent, ABG unremarkable, now back on trach collar    Past Medical History: Acute myocardial infarction CVA (cerebral vascular accident)  Allergies:  Allergy Status Unknown  Home meds:     PHYSICAL EXAMINATION  T(C): 36.6 (10-26-24 @ 04:34), Max: 37.7 (10-25-24 @ 09:23) HR: 67 (10-26-24 @ 07:00) (63 - 85) BP: 160/104 (10-26-24 @ 07:00) (117/82 - 160/104) RR: 10 (10-26-24 @ 07:00) (9 - 20) SpO2: 100% (10-26-24 @ 07:00) (96% - 100%)  NEUROLOGIC EXAMINATION:  Patient is B eye opens spontaneously, R2 L3 brisk, (+) bilateral corneals, not FC, tracking with eyes up and down, R UE localizes, B LE withdraws, L UE trace movements, not mimicking; reflexive hand   GENERAL: trach collar 35%  EENT:  anicteric, R eye erythematous  CARDIOVASCULAR: (+) S1 S2, normal rate and regular rhythm  PULMONARY: clear to auscultation bilaterally (q4h suctioning oral secretions, coughing sputum from trach)  ABDOMEN: soft, nontender with normoactive bowel sounds  EXTREMITIES: no edema  SKIN: no rash    LABS: 10-26             (6.21) 11.2 (11.0)  5.63  )-----------( 326      ( 26 Oct 2024 05:30 )             35.7     145  |  110[H]  |  56[H] (53)  ----------------------------<  127[H]  4.7   |  23  |  3.21[H] (3.27)    Ca    9.3      26 Oct 2024 05:30  Phos  4.9     10-26  Mg     2.7     10-26    10-25 @ 07:01  -  10-26 @ 07:00  --------------------------------------------------------  IN: 2335 mL / OUT: 1755 mL / NET: 580 mL    10-26 @ 07:01  -  10-26 @ 07:40  --------------------------------------------------------  IN: 45 mL / OUT: 0 mL / NET: 45 mL    ABG - ( 25 Oct 2024 20:59 ) pH, Arterial: 7.34  pH, Blood: x     /  pCO2: 42    /  pO2: 111   / HCO3: 23    / Base Excess: -3.0  /  SaO2: 98.6       Bacteriology:  10/16 sputum culture acinetobacter  10/15 Blood CS NG5D    CSF studies:  EEG:  [id number] 9:30 AM	CT Brain	Large pontine hemorrhage, SAH, edema, no hydrocephalus. Multiple old infarcts.  2024 1:11 PM	CTA Head/Neck	Pontine hemorrhage stable, no malformation, stable ventricular size, no stenosis or aneurysm.  2024 6:40 PM	CT Brain	Enlarged pontine hemorrhage, SAH in frontal lobes.  2024        	CT Brain	Stable pontine hemorrhage, SAH stable.  10/03/2024 11:06 AM	CT Brain	Stable hematoma, no rebleeding.  10/12/2024 9:16 AM	MRI Brain	Stable hemorrhage, no new bleeding, cerebellar infarct.    Other imaging:  Date/Time	Type of Study	Results  10/01/2024 11:49 AM	US Retroperit	No hydronephrosis; chronic renal disease; Vuong catheter.  10/01/2024 11:14 AM	XR Abdomen	Orogastric tube coiled in stomach; nonspecific bowel gas pattern.  10/07/2024 10:25 PM	XR Abdomen	Nonspecific bowel gas pattern.  10/08/2024 8:01 AM	US Retroperit	Normal kidneys; distended bladder; mild hydronephrosis.  10/10/2024 7:32 AM	XR Abdomen	Nasogastric tube; nonspecific bowel gas pattern.  10/19/2024 1:13 PM	US DPLX Veins	No DVT in lower extremities.    MEDICATIONS: 10-26    ·	heparin   Injectable 5000 SubCutaneous every 8 hours  ·	amLODIPine   Tablet 10 milliGRAM(s) Oral daily  ·	doxazosin 8 milliGRAM(s) Oral at bedtime  ·	acetylcysteine 10%  Inhalation 4 Inhalation every 12 hours  ·	albuterol/ipratropium for Nebulization 3 Nebulizer every 12 hours  ·	artificial tears (preservative free) Ophthalmic Solution 1 Right EYE every 2 hours  ·	atropine 1% Ophthalmic Solution for SubLingual Use 2 SubLingual every 8 hours  ·	calcium acetate 667 Oral three times a day with meals  ·	erythromycin   Ointment 1 Right EYE every 4 hours  ·	ofloxacin 0.3% Solution 1 Right EYE four times a day  ·	petrolatum Ophthalmic Ointment 1 Both EYES two times a day  ·	sodium zirconium cyclosilicate 5 Oral daily  ·	acetaminophen   Oral Liquid .. 650 Oral every 6 hours PRN  ·	oxyCODONE    Solution 5 Oral every 4 hours PRN     IV FLUIDS: IVL  DRIPS:  DIET: Nepro @ 45cc/hr  Lines:   Drains:   Vuong (inserted 10/24)  Wounds:    CODE STATUS:  Full Code                       GOALS OF CARE:  aggressive                      DISPOSITION:  ICU  ICH Score 3

## 2024-10-26 NOTE — PROGRESS NOTE ADULT - SUBJECTIVE AND OBJECTIVE BOX
Neurocritical Care Attending Note     GARETT REYES   MRN-[id number]  Summary:  44y/M  unknown past medical history (reported stroke and MI by coworkers) presented to [facility] with AMS, Pt was working at [employer] when he was found down by coworkers. EMS called and pt brought to [facility] ED. Intubated, sedated, started on cardene for SBPs in 200s. CT head showed brain stem bleed. Transferred to NSICU for further management.  (30 Sep 2024 12:55)    Hospital Course/Interval Events  9/30: transferred from [facility]. A line placed. Versed dc'd. Roomate Prasanth at bedside, states pt has family in [place], cannot confirm medications or PMH other than stroke and MI. 250cc bolus 3% given. LR switched to NS. hydralazine 25q8 started, 3% started, switched propofol to precedex   10/1: stability CTH done. Added labetalol, started TF. Palliative consulted. ethics consulted to determine surrogate. febrile 103, pan cx sent  10/2: BD 2, GEORGE overnight. TF resumed. Desatt'd to 80s, FiO2 inc. to 50. Fentanyl given, ABG, CXR ordered. Maxxed on precedex, started on propofol for DARIEN -4 - -5. Precedex dc'd. Duonebs, mucomyst, hypertonic added. 3% dc'd. Cardene dc'd. Start vanc/CTX. Increased labetalol 200q8. MRSA negative, dc'd vanc. ETT pulled back 2cm x 2, good positioning after confirmatory chest xray. Ethics attempting to establish HCP with family. Na 159, starting FW 250q6 for range 150-155.   10/3: BD3, GEORGE o/n, neuro stable. Na elevating, FW increased to 300q6. Dc'd bowel reg for diarrhea. vEEG started. SQH 5000q8 tonight.   10/4: BD 4, albumn bolus, incr. LR to 80 2/2 incr. in Cr, LR to 100cc/hr for uptrending Cr. Started 7% hypersal for 48hrs and SL atropine for inline/oral thick secretions. Dc'd CTX and started ancef for MSSA in the sputum. Nephrology consulted for CKD, f/u recs. SBP 170s, given hydralazine 10mg IVP.   10/5: BD5, o/n 10mg IVP hydralazine given for SBP 170s and started on hydralazine 25q8 via OGT. 10mg IV push labetalol for SBP > 160s. RT placed for diarrhea.   10/6: BD6, o/n FW increased to 350q4 per nephrology recs. IV tylenol for temp 100.6, SBp 160s presumed uncomfortable.   10/7: BD7, overnight pancultured for temp 101.8F.   10/8: BD8. GEORGE. Cr bumped. decreased LR to 75cc/hr. Adding simethicone ATC. incr hydralazine 50mgTID. Incr labetalol 300mgTID. Na 145, decreased FWF to 250q6. Start precedex. FENa consistent with intrinsic kidney injury. Pend repeat renal US. Retaining up to 1.3L, bladder scans q6, straight cath PRN  10/9: BD 9. GEORGE overnight. Neuro stable. abd xray for distention w non-specific gas pattern, OGT to LIWS for morning. duonebs/mucomyst to q8 for improving secretions. Changed tube feeds to Jevity 1.5 20cc/hr, low rate due to abdominal distention, nepro dense and more difficult to digest. Tolerating CPAP, confirmed by ABG.   10/10: BD 10. GEORGE overnight. Neuro stable. (+) gabriel for urinary retention on bladder scan. inc TF to goal rate of 40cc/hr. family leaning toward pursuing trach/PEG. 1/2 amp for FS 81.   10/11: BD 11. GEORGE overnight. Neuro stable. Trach/PEG consults placed.   10/12: BD 12. GEORGE overnight. Neuro stable. MRI brain complete.   10/13: BD 13. Increase flomax. Hold SQH after PM dose for trach tm. IVL.   10/14: BD 14. GEORGE overnight, remains on AC/VC. Gabriel placed for urinary retention. Dc'd free water.  S/p trach with pulm. NGT placed and CXR confirmed in good position.   10/15: BD 15, GEORGE ovn. resumed feeds. spiked 101, pan cx sent.   10/16: BD 16. GEORGE ovn. Lokelma 5mg for K+ 5.4. Started vanc q 24/zosyn for empiric PNA coverage, IVF to 100/hr. PEG held for fever.   10/17: Na low, ordered serum osm and urine osm for am. Started sinemet for neurostimulation. Increased cardura to 0.8. Started FW 100q4, dc'd IVF.   10/18: BD 18, GEORGE overnight, neuro stable. Amantadine added for neurostim.   10/19: BD 19, GEORGE ovn. cardura 2mg added for retention. labetalol decreased 200q8, hydralazine decreased 25q8. Gabriel replaced.   10/20: BD20, GEORGE overnight. NGT dislodged, replaced.   10/21: BD 21. POD0 PEG placement with Gen surg. decr labetolol to 50q8, incr. cardura to 0.4, started lokelma and phoslo, dc gabriel POD0 PEG placement with Gen surg.  10/22: BD 22. Plan to start TF today via PEG. dc labetalol, Following ophtho recs. Increased apnea settings - found to be in cheyne-moe respiration. CPAP 5/5.  10/23: BD 23. hydralazine d/c'd, trach collar trial today. Rectal tube placed at 6am.  10/24: BD24, o/n lokelma held due to diarrhea. Free water 100q6 resumed. dc'd tamsulosin, amantadine. Incr'd cardura to 8mg qhs. Dc'd FW. Switched jevity to nepro. gabriel placed for high urine output. Started SL atropine for oral secretions. Dc'd free water.   10/25: BD25, o/n decreased suctioning requirements to > q4hrs, GEORGE. Cr improving, cont phoslo, lokelma held at this time. Gabriel placed yest, cont. Tolerating trach collar. Given 500cc plasmalyte bolus for ANIKA. Dc'd sinemet.   10/26: BD26, o/n resumed lokelma 5mg daily and resumed 100cc free water q6hrs. Change in neuro status with new right pupillary dilation with anisocoria (right pupil 6mm fixed and left pupil 3mm briskly reactive). Given 23.4% NaCl bullet, taken for emergent CTH showing mostly resolved pontine hemorrhage, continued brainstem hypodensity likely edema d/t hemorrhage, no new hemorrhage or infarct, no herniation, mild increase in size of left lateral ventricle. Vitals remaine stable. Na goal > 140.     MEDICATIONS  (STANDING):  acetylcysteine 10%  Inhalation 4 milliLiter(s) Inhalation every 12 hours  albuterol/ipratropium for Nebulization 3 milliLiter(s) Nebulizer every 12 hours  amLODIPine   Tablet 10 milliGRAM(s) Oral daily  artificial tears (preservative free) Ophthalmic Solution 1 Drop(s) Right EYE every 2 hours  atropine 1% Ophthalmic Solution for SubLingual Use 2 Drop(s) SubLingual every 12 hours  calcium acetate 667 milliGRAM(s) Oral three times a day with meals  chlorhexidine 2% Cloths 1 Application(s) Topical <User Schedule>  doxazosin 8 milliGRAM(s) Oral at bedtime  erythromycin   Ointment 1 Application(s) Right EYE every 6 hours  heparin   Injectable 5000 Unit(s) SubCutaneous every 8 hours  ofloxacin 0.3% Solution 1 Drop(s) Right EYE four times a day  petrolatum Ophthalmic Ointment 1 Application(s) Both EYES two times a day  sodium zirconium cyclosilicate 5 Gram(s) Oral daily    MEDICATIONS  (PRN):  acetaminophen   Oral Liquid .. 650 milliGRAM(s) Oral every 6 hours PRN Temp greater or equal to 38C (100.4F), Mild Pain (1 - 3)  oxyCODONE    Solution 5 milliGRAM(s) Oral every 4 hours PRN Moderate Pain (4 - 6)        Physical Exam  ICU Vital Signs Last 24 Hrs  T(C): 36.8 (26 Oct 2024 17:42), Max: 37 (25 Oct 2024 22:04)  T(F): 98.2 (26 Oct 2024 17:42), Max: 98.6 (25 Oct 2024 22:04)  HR: 67 (26 Oct 2024 18:00) (63 - 83)  BP: 132/85 (26 Oct 2024 18:00) (124/85 - 160/104)  BP(mean): 102 (26 Oct 2024 18:00) (100 - 125)  RR: 18 (26 Oct 2024 18:00) (8 - 20)  SpO2: 97% (26 Oct 2024 18:00) (96% - 100%)    O2 Parameters below as of 26 Oct 2024 18:00  Patient On (Oxygen Delivery Method): tracheostomy collar  O2 Flow (L/min): 10  O2 Concentration (%): 35    Gen: no apparent distress  HEENT right eye conjunctival injection and corneal abrasion, neck supple, tracheostomy in place   RESP: No respiratory distress, CTA b/l, no WRR  CV: RRR, +S1S2  GI: Soft, NT, ND rectal tube in place  Extr: non edematous, peripheral pulses present symmetric   NEURO:   Mental Status: sleeping, not opening to verbal stimulus, trace opening to noxious, intermittent downward gaze/ocular bobbing, can track vertically, appears to have impaired horizontal bilateral eyes movement - not following commands   CNs: Disconjugated gaze, left eye some abduction and adduction movements noted, spontaneous vs. purposeful downward intermittent eye movement, pupils right 6.5 mm sluggish reactive to light (NPI 0.5), left 3 mm reactive (NPI 4.5), corneal + L, trace on right,  cough ++, breathing over vent  Motor: head/neck movement towards noxious, extensor posturing to noxious at the bilateral upper extremities, triple flexion at the bilateral lower extremities     ASSESSMENT/PLAN  45 y/o PMH ?stroke/MI present to [facility] after collapsing at work. Decorticate posturing, vomiting, intubated for airway protection. 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 Transferred to [facility] for further management. s/p trach 10/14. Course complicated by persistent encephalopathy/wakeful unresponsiveness, respiratory failure, VAP.     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 on 10/25 PM with marked anisocoria (right 6 mm non-reactive)  Data     - CTA head and neck 09/30: Re-demonstration of pontine hemorrhage with associated edema and mass effect upon the fourth ventricle and cerebral aqueduct. No underlying no malformation is identified. No increase in ventricular size since the   prior study.  - CT head 10/03: Again noted is a parenchymal hematoma in the nabil, with associated  expansion and surrounding vasogenic edema. There is mild spillage into the subarachnoid spaces at the cistern and within sulci in the right frontal lobe. All of these findings appear stable, without interval -rebleeding. There is a chronic hemorrhagic infarct in the left external capsule/corona radiata. There is a small chronic white matter infarct vs small vessel disease in the right corona radiata. Moderate generalized cerebral volume loss, with distention of the sulci and concomitant ex-vacuo ventricular dilatation. Mild nonspecific low attenuation in the periventricular and subcortical white matter. No midline shift or herniation. No CT evidence of acute territorial infarction, although MRI with DWI would be more sensitive. Limited views of the sinuses and mastoids show mild mucosal thickening without air-fluid levels, likely chronic. An ETT is noted in place. Limited views of the orbits and visualized soft tissues of the neck, face, scalp, skull base, and calvarium are otherwise unremarkable. IMPRESSION: No significant change, without interval rebleeding.  - MRI brain 10/12: Similar-appearing evolving acute parenchymal hemorrhage within the nabil with surrounding mass effect and edema. No underlying enhancement. No new areas of acute intracranial hemorrhage. Wedge-shaped acute/subacute infarct within the right lateral cerebellar hemisphere. Unchanged encephalomalacia and gliosis within the left external capsule related to remote hemorrhage in this location.  - vEEG 10/17: with predominant delta slowing with superimposed geo and beta/some reactivity present     Plan  Neuro check q2 and vitals q1  Na goal > 140  BP goal 100-160, MAP > 65  Continue Amantadine to promote awakening   STAT CT head for any acute neurological change   Off sedation  Oxycodone/Tylenol for pain PRN  Plan for long term vent rehab facility   Opthalmology following --> tegaderm  / erythromycin x eye coverage    CV   Diagnosis: Uncontrolled hypertension at presentation; now resolved   Data  HR: 67 (26 Oct 2024 18:00) (63 - 83)  BP: 132/85 (26 Oct 2024 18:00) (124/85 - 160/104)  BP(mean): 102 (26 Oct 2024 18:00) (100 - 125)  ECHO 9/30 hyperdynamic left ventricular function EF 70%  Plan  SBP goal 100-150, MAP > 65  Continue amlodipine 10 mg daily   (off hydralazine and labetalol)  Telemetry monitoring    PULM   Diagnosis: Intubated for airway protection in the setting of acute encephalopathy; persistent respiratory failure; S/P tracheostomy; VAP,  trach collar since 10/23 AM  Data  RR: 18 (26 Oct 2024 18:00) (8 - 20)  SpO2: 97% (26 Oct 2024 18:00) (96% - 100%)  O2 Parameters below as of 26 Oct 2024 18:00  Patient On (Oxygen Delivery Method): tracheostomy collar  O2 Flow (L/min): 10  O2 Concentration (%): 35  Chest x-ray 10/17: Similar to prior exam 10/16/2024 with right lung base partial atelectasis. Nasogastric tube tip below hemidiaphragm. No pneumothorax. No acute infiltrate appearing. Possible minimal right pleural effusion. No left pleural effusion.  Chest x-ray 10/21:  Two views of chest obtained at bedside. NG tube in stomach. Small discoid atelectasis right perihilar region. No pleural effusion.  Cardia mediastinal silhouette unremarkable. Degenerative changes of spine.  IMPRESSION: Small atelectasis right perihilar region is unchanged.  ABG - ( 25 Oct 2024 20:59 )  pH, Arterial: 7.34  pH, Blood: x     /  pCO2: 42    /  pO2: 111   / HCO3: 23    / Base Excess: -3.0  /  SaO2: 98.6    Plan   Continue trach collar   Pulm toilet/chest PT q4/Nebsq12  pulse-oxymetry monitoring   atropine for oral secretion   monitor for suctioning requirement    RENAL   Diagnosis: ANIKA on CKD (unknown baseline) - - > stable; urinary retention requiring Gabriel placement; intermittent hyperkalemia   Data  10-26    145  |  110[H]  |  56[H]  ----------------------------<  127[H]  4.7   |  23  |  3.21[H]    Ca    9.3      26 Oct 2024 05:30  Phos  4.9     10-26  Mg     2.7     10-26    I&O's Summary    25 Oct 2024 07:01  -  26 Oct 2024 07:00  --------------------------------------------------------  IN: 2335 mL / OUT: 1755 mL / NET: 580 mL    26 Oct 2024 07:01  -  26 Oct 2024 18:36  --------------------------------------------------------  IN: 595 mL / OUT: 645 mL / NET: -50 mL    Plan   Na goal > 140  Euvolemia goal  Monitor for hyperkalemia/monitor I&O  LOKELMA for hyperkalemia    Gabriel due to refractory urinary retention   Tamsulosin + Doxazosin   Avoid nephrotoxic medications    ID  Diagnosis: Afebrile, normal white count; s/p Acinetobacter B. Vent Associated PNA  Data  WBC 5.63 < 6.21 < 6.23   Blood Cx 10/15: NGTD   Sputum Cx 10/16: Acinetobacter B.   Procalcitonin 10/16: 0.36 <-- 0.13   Plan   - S/p  7-day antibiotics Vanc and Zosyn ---> narrowed to Unasyn (10/16 - 10/23) for Acinetobacter B. 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 s/p PEG placement (10/21 by Surgery), diarrhea requiring rectal tube  Data  Last Bowel Movement: 23-Oct-2024 (10-26-24 @ 09:00)  Plan   S/p PEG 10/21  Enteral feeding via PEG at goal  GI prophylaxis while intubated   rectal tube ---> d/c as output is decreasing   Holding bowel regimen given diarrhea    HEME/ONC  Diagnosis: no active issues   Data  H&H 11.2/35.7    LE venous ultrasound 10/19: negative for DVT     Plan   Monitor H&H  Hbg goal > 7.0, PLT > 100  SDCs + heparin 5000 q8 for DVT prophylaxis    ENDO  Diagnosis: no active issues   Data  A1c: 5.3  Plan:   Goal euglycemia (-180)  Avoid hypoglycemic episodes      CODE STATUS: FULL CODE  Patient's son (Chauncey Reyes) updated at bedside with . He understands the current clinical condition of his father and would like for him to continue receiving full life sustaining measures.     DISPO:   NeuroICU  Transfer to [facility] Step[facility] pending bed availability         Jazlyn Tomlin MD   Neurocritical Care Attending                Neurocritical Care Attending Note     GARETT REYES   MRN-[id number]  Summary:  44y/M  unknown past medical history (reported stroke and MI by coworkers) presented to [facility] with AMS, Pt was working at [employer] when he was found down by coworkers. EMS called and pt brought to [facility] ED. Intubated, sedated, started on cardene for SBPs in 200s. CT head showed brain stem bleed. Transferred to NSICU for further management.  (30 Sep 2024 12:55)    Hospital Course/Interval Events  9/30: transferred from [facility]. A line placed. Versed dc'd. Roomate Prasanth at bedside, states pt has family in [place], cannot confirm medications or PMH other than stroke and MI. 250cc bolus 3% given. LR switched to NS. hydralazine 25q8 started, 3% started, switched propofol to precedex   10/1: stability CTH done. Added labetalol, started TF. Palliative consulted. ethics consulted to determine surrogate. febrile 103, pan cx sent  10/2: BD 2, GEORGE overnight. TF resumed. Desatt'd to 80s, FiO2 inc. to 50. Fentanyl given, ABG, CXR ordered. Maxxed on precedex, started on propofol for DARIEN -4 - -5. Precedex dc'd. Duonebs, mucomyst, hypertonic added. 3% dc'd. Cardene dc'd. Start vanc/CTX. Increased labetalol 200q8. MRSA negative, dc'd vanc. ETT pulled back 2cm x 2, good positioning after confirmatory chest xray. Ethics attempting to establish HCP with family. Na 159, starting FW 250q6 for range 150-155.   10/3: BD3, GEORGE o/n, neuro stable. Na elevating, FW increased to 300q6. Dc'd bowel reg for diarrhea. vEEG started. SQH 5000q8 tonight.   10/4: BD 4, albumn bolus, incr. LR to 80 2/2 incr. in Cr, LR to 100cc/hr for uptrending Cr. Started 7% hypersal for 48hrs and SL atropine for inline/oral thick secretions. Dc'd CTX and started ancef for MSSA in the sputum. Nephrology consulted for CKD, f/u recs. SBP 170s, given hydralazine 10mg IVP.   10/5: BD5, o/n 10mg IVP hydralazine given for SBP 170s and started on hydralazine 25q8 via OGT. 10mg IV push labetalol for SBP > 160s. RT placed for diarrhea.   10/6: BD6, o/n FW increased to 350q4 per nephrology recs. IV tylenol for temp 100.6, SBp 160s presumed uncomfortable.   10/7: BD7, overnight pancultured for temp 101.8F.   10/8: BD8. GEORGE. Cr bumped. decreased LR to 75cc/hr. Adding simethicone ATC. incr hydralazine 50mgTID. Incr labetalol 300mgTID. Na 145, decreased FWF to 250q6. Start precedex. FENa consistent with intrinsic kidney injury. Pend repeat renal US. Retaining up to 1.3L, bladder scans q6, straight cath PRN  10/9: BD 9. GEORGE overnight. Neuro stable. abd xray for distention w non-specific gas pattern, OGT to LIWS for morning. duonebs/mucomyst to q8 for improving secretions. Changed tube feeds to Jevity 1.5 20cc/hr, low rate due to abdominal distention, nepro dense and more difficult to digest. Tolerating CPAP, confirmed by ABG.   10/10: BD 10. GEORGE overnight. Neuro stable. (+) gabriel for urinary retention on bladder scan. inc TF to goal rate of 40cc/hr. family leaning toward pursuing trach/PEG. 1/2 amp for FS 81.   10/11: BD 11. GEORGE overnight. Neuro stable. Trach/PEG consults placed.   10/12: BD 12. GEORGE overnight. Neuro stable. MRI brain complete.   10/13: BD 13. Increase flomax. Hold SQH after PM dose for trach tm. IVL.   10/14: BD 14. GEORGE overnight, remains on AC/VC. Gabriel placed for urinary retention. Dc'd free water.  S/p trach with pulm. NGT placed and CXR confirmed in good position.   10/15: BD 15, GEORGE ovn. resumed feeds. spiked 101, pan cx sent.   10/16: BD 16. GEORGE ovn. Lokelma 5mg for K+ 5.4. Started vanc q 24/zosyn for empiric PNA coverage, IVF to 100/hr. PEG held for fever.   10/17: Na low, ordered serum osm and urine osm for am. Started sinemet for neurostimulation. Increased cardura to 0.8. Started FW 100q4, dc'd IVF.   10/18: BD 18, GEORGE overnight, neuro stable. Amantadine added for neurostim.   10/19: BD 19, GEORGE ovn. cardura 2mg added for retention. labetalol decreased 200q8, hydralazine decreased 25q8. Gabriel replaced.   10/20: BD20, GEORGE overnight. NGT dislodged, replaced.   10/21: BD 21. POD0 PEG placement with Gen surg. decr labetolol to 50q8, incr. cardura to 0.4, started lokelma and phoslo, dc gabriel POD0 PEG placement with Gen surg.  10/22: BD 22. Plan to start TF today via PEG. dc labetalol, Following ophtho recs. Increased apnea settings - found to be in cheyne-moe respiration. CPAP 5/5.  10/23: BD 23. hydralazine d/c'd, trach collar trial today. Rectal tube placed at 6am.  10/24: BD24, o/n lokelma held due to diarrhea. Free water 100q6 resumed. dc'd tamsulosin, amantadine. Incr'd cardura to 8mg qhs. Dc'd FW. Switched jevity to nepro. gabriel placed for high urine output. Started SL atropine for oral secretions. Dc'd free water.   10/25: BD25, o/n decreased suctioning requirements to > q4hrs, GEORGE. Cr improving, cont phoslo, lokelma held at this time. Gabriel placed yest, cont. Tolerating trach collar. Given 500cc plasmalyte bolus for ANIKA. Dc'd sinemet.   10/26: BD26, o/n resumed lokelma 5mg daily and resumed 100cc free water q6hrs. Change in neuro status with new right pupillary dilation with anisocoria (right pupil 6mm fixed and left pupil 3mm briskly reactive). Given 23.4% NaCl bullet, taken for emergent CTH showing mostly resolved pontine hemorrhage, continued brainstem hypodensity likely edema d/t hemorrhage, no new hemorrhage or infarct, no herniation, mild increase in size of left lateral ventricle. Vitals remaine stable. Na goal > 140.     MEDICATIONS  (STANDING):  acetylcysteine 10%  Inhalation 4 milliLiter(s) Inhalation every 12 hours  albuterol/ipratropium for Nebulization 3 milliLiter(s) Nebulizer every 12 hours  amLODIPine   Tablet 10 milliGRAM(s) Oral daily  artificial tears (preservative free) Ophthalmic Solution 1 Drop(s) Right EYE every 2 hours  atropine 1% Ophthalmic Solution for SubLingual Use 2 Drop(s) SubLingual every 12 hours  calcium acetate 667 milliGRAM(s) Oral three times a day with meals  chlorhexidine 2% Cloths 1 Application(s) Topical <User Schedule>  doxazosin 8 milliGRAM(s) Oral at bedtime  erythromycin   Ointment 1 Application(s) Right EYE every 6 hours  heparin   Injectable 5000 Unit(s) SubCutaneous every 8 hours  ofloxacin 0.3% Solution 1 Drop(s) Right EYE four times a day  petrolatum Ophthalmic Ointment 1 Application(s) Both EYES two times a day  sodium zirconium cyclosilicate 5 Gram(s) Oral daily    MEDICATIONS  (PRN):  acetaminophen   Oral Liquid .. 650 milliGRAM(s) Oral every 6 hours PRN Temp greater or equal to 38C (100.4F), Mild Pain (1 - 3)  oxyCODONE    Solution 5 milliGRAM(s) Oral every 4 hours PRN Moderate Pain (4 - 6)        Physical Exam  ICU Vital Signs Last 24 Hrs  T(C): 36.8 (26 Oct 2024 17:42), Max: 37 (25 Oct 2024 22:04)  T(F): 98.2 (26 Oct 2024 17:42), Max: 98.6 (25 Oct 2024 22:04)  HR: 67 (26 Oct 2024 18:00) (63 - 83)  BP: 132/85 (26 Oct 2024 18:00) (124/85 - 160/104)  BP(mean): 102 (26 Oct 2024 18:00) (100 - 125)  RR: 18 (26 Oct 2024 18:00) (8 - 20)  SpO2: 97% (26 Oct 2024 18:00) (96% - 100%)    O2 Parameters below as of 26 Oct 2024 18:00  Patient On (Oxygen Delivery Method): tracheostomy collar  O2 Flow (L/min): 10  O2 Concentration (%): 35    Gen: no apparent distress  HEENT right eye conjunctival injection and corneal abrasion, neck supple, tracheostomy in place   RESP: No respiratory distress, CTA b/l, no WRR  CV: RRR, +S1S2  GI: Soft, NT, ND rectal tube in place  Extr: non edematous, peripheral pulses present symmetric   NEURO:   Mental Status: awake, alert,  intermittent downward gaze/ocular bobbing, can track vertically, appears to have impaired horizontal bilateral eyes movement - consistently following commands (showing two fingers with right hand and wiggling toes with right foot)  CNs: Disconjugated gaze, left eye some abduction and adduction movements noted, spontaneous vs. purposeful downward intermittent eye movement, pupils right 6.5 mm sluggish reactive to light (NPI 0.5), left 3 mm reactive (NPI 4.5), corneal + L, trace on right,  cough ++, breathing over vent  Motor: head/neck movement towards noxious, extensor posturing to noxious at the bilateral upper extremities, triple flexion at the bilateral lower extremities     ASSESSMENT/PLAN  43 y/o PMH ?stroke/MI present to [facility] after collapsing at work. Decorticate posturing, vomiting, intubated for airway protection. 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 Transferred to [facility] for further management. s/p trach 10/14. Course complicated by persistent encephalopathy/wakeful unresponsiveness, respiratory failure, VAP.     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 on 10/25 PM with marked anisocoria (right 6 mm non-reactive)  Data     - CTA head and neck 09/30: Re-demonstration of pontine hemorrhage with associated edema and mass effect upon the fourth ventricle and cerebral aqueduct. No underlying no malformation is identified. No increase in ventricular size since the   prior study.  - CT head 10/03: Again noted is a parenchymal hematoma in the nabil, with associated  expansion and surrounding vasogenic edema. There is mild spillage into the subarachnoid spaces at the cistern and within sulci in the right frontal lobe. All of these findings appear stable, without interval -rebleeding. There is a chronic hemorrhagic infarct in the left external capsule/corona radiata. There is a small chronic white matter infarct vs small vessel disease in the right corona radiata. Moderate generalized cerebral volume loss, with distention of the sulci and concomitant ex-vacuo ventricular dilatation. Mild nonspecific low attenuation in the periventricular and subcortical white matter. No midline shift or herniation. No CT evidence of acute territorial infarction, although MRI with DWI would be more sensitive. Limited views of the sinuses and mastoids show mild mucosal thickening without air-fluid levels, likely chronic. An ETT is noted in place. Limited views of the orbits and visualized soft tissues of the neck, face, scalp, skull base, and calvarium are otherwise unremarkable. IMPRESSION: No significant change, without interval rebleeding.  - MRI brain 10/12: Similar-appearing evolving acute parenchymal hemorrhage within the nabil with surrounding mass effect and edema. No underlying enhancement. No new areas of acute intracranial hemorrhage. Wedge-shaped acute/subacute infarct within the right lateral cerebellar hemisphere. Unchanged encephalomalacia and gliosis within the left external capsule related to remote hemorrhage in this location.  - vEEG 10/17: with predominant delta slowing with superimposed geo and beta/some reactivity present     Plan  Neuro check q4 and vitals q4  Na > 140   BP goal 100-160, MAP > 65  Continue Amantadine to promote awakening   STAT CT head for any acute neurological change   Off sedation  Oxycodone/Tylenol for pain PRN  Plan for long term vent rehab facility   Opthalmology following --> tegaderm  / erythromycin x eye coverage    CV   Diagnosis: Uncontrolled hypertension at presentation; now resolved   Data  HR: 67 (26 Oct 2024 18:00) (63 - 83)  BP: 132/85 (26 Oct 2024 18:00) (124/85 - 160/104)  BP(mean): 102 (26 Oct 2024 18:00) (100 - 125)  ECHO 9/30 hyperdynamic left ventricular function EF 70%  Plan  SBP goal 100-150, MAP > 65  Continue amlodipine 10 mg daily   (off hydralazine and labetalol)  Telemetry monitoring    PULM   Diagnosis: Intubated for airway protection in the setting of acute encephalopathy; persistent respiratory failure; S/P tracheostomy; VAP,  trach collar since 10/23 AM  Data  RR: 18 (26 Oct 2024 18:00) (8 - 20)  SpO2: 97% (26 Oct 2024 18:00) (96% - 100%)  O2 Parameters below as of 26 Oct 2024 18:00  Patient On (Oxygen Delivery Method): tracheostomy collar  O2 Flow (L/min): 10  O2 Concentration (%): 35  Chest x-ray 10/17: Similar to prior exam 10/16/2024 with right lung base partial atelectasis. Nasogastric tube tip below hemidiaphragm. No pneumothorax. No acute infiltrate appearing. Possible minimal right pleural effusion. No left pleural effusion.  Chest x-ray 10/21:  Two views of chest obtained at bedside. NG tube in stomach. Small discoid atelectasis right perihilar region. No pleural effusion.  Cardia mediastinal silhouette unremarkable. Degenerative changes of spine.  IMPRESSION: Small atelectasis right perihilar region is unchanged.  ABG - ( 25 Oct 2024 20:59 )  pH, Arterial: 7.34  pH, Blood: x     /  pCO2: 42    /  pO2: 111   / HCO3: 23    / Base Excess: -3.0  /  SaO2: 98.6    Plan   Continue trach collar   Pulm toilet/chest PT q4/Nebsq12  pulse-oxymetry monitoring   atropine for oral secretion   monitor for suctioning requirement    RENAL   Diagnosis: ANIKA on CKD (unknown baseline) - - > stable; urinary retention requiring Gabriel placement; intermittent hyperkalemia   Data  10-26    145  |  110[H]  |  56[H]  ----------------------------<  127[H]  4.7   |  23  |  3.21[H]    Ca    9.3      26 Oct 2024 05:30  Phos  4.9     10-26  Mg     2.7     10-26    I&O's Summary    25 Oct 2024 07:01  -  26 Oct 2024 07:00  --------------------------------------------------------  IN: 2335 mL / OUT: 1755 mL / NET: 580 mL    26 Oct 2024 07:01  -  26 Oct 2024 18:36  --------------------------------------------------------  IN: 595 mL / OUT: 645 mL / NET: -50 mL    Plan   Na goal > 140  Euvolemia goal  FWF 100q6  Monitor for hyperkalemia/monitor I&O  LOKELMA for hyperkalemia    Gabriel due to refractory urinary retention   Tamsulosin + Doxazosin   Avoid nephrotoxic medications    ID  Diagnosis: Afebrile, normal white count; s/p Acinetobacter B. Vent Associated PNA  Data  WBC 5.63 < 6.21 < 6.23   Blood Cx 10/15: NGTD   Sputum Cx 10/16: Acinetobacter B.   Procalcitonin 10/16: 0.36 <-- 0.13   Plan   - S/p  7-day antibiotics Vanc and Zosyn ---> narrowed to Unasyn (10/16 - 10/23) for Acinetobacter B. 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 s/p PEG placement (10/21 by Surgery), diarrhea requiring rectal tube  Data  Last Bowel Movement: 23-Oct-2024   Plan   S/p PEG 10/21  Enteral feeding via PEG at goal  GI prophylaxis while intubated   Resume bowel movement       HEME/ONC  Diagnosis: no active issues   Data  H&H 11.2/35.7    LE venous ultrasound 10/19: negative for DVT     Plan   Monitor H&H  Hbg goal > 7.0, PLT > 100  SDCs + heparin 5000 q8 for DVT prophylaxis    ENDO  Diagnosis: no active issues   Data  A1c: 5.3  Plan:   Goal euglycemia (-180)  Avoid hypoglycemic episodes      CODE STATUS: FULL CODE  Patient's son (Chauncey Reyes) updated at bedside with . He understands the current clinical condition of his father and would like for him to continue receiving full life sustaining measures.     DISPO:   NeuroICU  Transfer to [facility] Stepdown pending bed availability       Jazlyn Tomlin MD   Neurocritical Care Attending

## 2024-10-27 LAB
ANION GAP SERPL CALC-SCNC: 10 MMOL/L — SIGNIFICANT CHANGE UP (ref 5–17)
BUN SERPL-MCNC: 61 MG/DL — HIGH (ref 7–23)
CALCIUM SERPL-MCNC: 9.4 MG/DL — SIGNIFICANT CHANGE UP (ref 8.4–10.5)
CHLORIDE SERPL-SCNC: 108 MMOL/L — SIGNIFICANT CHANGE UP (ref 96–108)
CO2 SERPL-SCNC: 23 MMOL/L — SIGNIFICANT CHANGE UP (ref 22–31)
CREAT SERPL-MCNC: 3.01 MG/DL — HIGH (ref 0.5–1.3)
EGFR: 25 ML/MIN/1.73M2 — LOW
EGFR: 25 ML/MIN/1.73M2 — LOW
GLUCOSE SERPL-MCNC: 129 MG/DL — HIGH (ref 70–99)
HCT VFR BLD CALC: 37.3 % — LOW (ref 39–50)
HGB BLD-MCNC: 11.6 G/DL — LOW (ref 13–17)
MAGNESIUM SERPL-MCNC: 2.6 MG/DL — SIGNIFICANT CHANGE UP (ref 1.6–2.6)
MCHC RBC-ENTMCNC: 29.1 PG — SIGNIFICANT CHANGE UP (ref 27–34)
MCHC RBC-ENTMCNC: 31.1 GM/DL — LOW (ref 32–36)
MCV RBC AUTO: 93.5 FL — SIGNIFICANT CHANGE UP (ref 80–100)
NRBC # BLD: 0 /100 WBCS — SIGNIFICANT CHANGE UP (ref 0–0)
NRBC BLD-RTO: 0 /100 WBCS — SIGNIFICANT CHANGE UP (ref 0–0)
PHOSPHATE SERPL-MCNC: 4.6 MG/DL — HIGH (ref 2.5–4.5)
PLATELET # BLD AUTO: 279 K/UL — SIGNIFICANT CHANGE UP (ref 150–400)
POTASSIUM SERPL-MCNC: 4.6 MMOL/L — SIGNIFICANT CHANGE UP (ref 3.5–5.3)
POTASSIUM SERPL-SCNC: 4.6 MMOL/L — SIGNIFICANT CHANGE UP (ref 3.5–5.3)
RBC # BLD: 3.99 M/UL — LOW (ref 4.2–5.8)
RBC # FLD: 12.9 % — SIGNIFICANT CHANGE UP (ref 10.3–14.5)
SODIUM SERPL-SCNC: 141 MMOL/L — SIGNIFICANT CHANGE UP (ref 135–145)
WBC # BLD: 6.72 K/UL — SIGNIFICANT CHANGE UP (ref 3.8–10.5)
WBC # FLD AUTO: 6.72 K/UL — SIGNIFICANT CHANGE UP (ref 3.8–10.5)

## 2024-10-27 PROCEDURE — 99232 SBSQ HOSP IP/OBS MODERATE 35: CPT

## 2024-10-27 PROCEDURE — 99233 SBSQ HOSP IP/OBS HIGH 50: CPT

## 2024-10-27 RX ORDER — HYPROMELLOSE 0.4 %
1 DROPS OPHTHALMIC (EYE) EVERY 4 HOURS
Refills: 0 | Status: DISCONTINUED | OUTPATIENT
Start: 2024-10-27 | End: 2025-02-19

## 2024-10-27 RX ORDER — OFLOXACIN 3 MG/ML
1 SOLUTION OPHTHALMIC EVERY 6 HOURS
Refills: 0 | Status: DISCONTINUED | OUTPATIENT
Start: 2024-10-27 | End: 2024-11-18

## 2024-10-27 RX ADMIN — Medication 1 DROP(S): at 08:06

## 2024-10-27 RX ADMIN — SODIUM ZIRCONIUM CYCLOSILICATE 5 GRAM(S): 5 POWDER, FOR SUSPENSION ORAL at 14:59

## 2024-10-27 RX ADMIN — Medication 1 APPLICATION(S): at 06:21

## 2024-10-27 RX ADMIN — OFLOXACIN 1 DROP(S): 3 SOLUTION OPHTHALMIC at 06:25

## 2024-10-27 RX ADMIN — Medication 1 DROP(S): at 15:00

## 2024-10-27 RX ADMIN — IPRATROPIUM BROMIDE AND ALBUTEROL SULFATE 3 MILLILITER(S): .5; 2.5 SOLUTION RESPIRATORY (INHALATION) at 04:01

## 2024-10-27 RX ADMIN — DOXAZOSIN MESYLATE 8 MILLIGRAM(S): 8 TABLET ORAL at 22:01

## 2024-10-27 RX ADMIN — OFLOXACIN 1 DROP(S): 3 SOLUTION OPHTHALMIC at 00:02

## 2024-10-27 RX ADMIN — ERYTHROMYCIN 1 APPLICATION(S): 5 OINTMENT OPHTHALMIC at 17:47

## 2024-10-27 RX ADMIN — ERYTHROMYCIN 1 APPLICATION(S): 5 OINTMENT OPHTHALMIC at 06:22

## 2024-10-27 RX ADMIN — ACETYLCYSTEINE 4 MILLILITER(S): 200 INHALANT RESPIRATORY (INHALATION) at 04:01

## 2024-10-27 RX ADMIN — ERYTHROMYCIN 1 APPLICATION(S): 5 OINTMENT OPHTHALMIC at 12:13

## 2024-10-27 RX ADMIN — HEPARIN SODIUM 5000 UNIT(S): 1000 INJECTION INTRAVENOUS; SUBCUTANEOUS at 06:17

## 2024-10-27 RX ADMIN — Medication 1 DROP(S): at 00:01

## 2024-10-27 RX ADMIN — Medication 1 DROP(S): at 06:22

## 2024-10-27 RX ADMIN — OFLOXACIN 1 DROP(S): 3 SOLUTION OPHTHALMIC at 12:13

## 2024-10-27 RX ADMIN — HEPARIN SODIUM 5000 UNIT(S): 1000 INJECTION INTRAVENOUS; SUBCUTANEOUS at 14:58

## 2024-10-27 RX ADMIN — Medication 1 DROP(S): at 04:00

## 2024-10-27 RX ADMIN — Medication 1 APPLICATION(S): at 06:23

## 2024-10-27 RX ADMIN — Medication 1 APPLICATION(S): at 17:54

## 2024-10-27 RX ADMIN — Medication 667 MILLIGRAM(S): at 14:58

## 2024-10-27 RX ADMIN — HEPARIN SODIUM 5000 UNIT(S): 1000 INJECTION INTRAVENOUS; SUBCUTANEOUS at 22:01

## 2024-10-27 RX ADMIN — Medication 1 DROP(S): at 10:12

## 2024-10-27 RX ADMIN — IPRATROPIUM BROMIDE AND ALBUTEROL SULFATE 3 MILLILITER(S): .5; 2.5 SOLUTION RESPIRATORY (INHALATION) at 17:46

## 2024-10-27 RX ADMIN — ERYTHROMYCIN 1 APPLICATION(S): 5 OINTMENT OPHTHALMIC at 00:02

## 2024-10-27 RX ADMIN — Medication 667 MILLIGRAM(S): at 17:53

## 2024-10-27 RX ADMIN — ACETYLCYSTEINE 4 MILLILITER(S): 200 INHALANT RESPIRATORY (INHALATION) at 17:46

## 2024-10-27 RX ADMIN — Medication 1 DROP(S): at 17:47

## 2024-10-27 RX ADMIN — Medication 1 DROP(S): at 02:00

## 2024-10-27 RX ADMIN — OFLOXACIN 1 DROP(S): 3 SOLUTION OPHTHALMIC at 17:47

## 2024-10-27 RX ADMIN — Medication 667 MILLIGRAM(S): at 10:08

## 2024-10-27 RX ADMIN — Medication 2 DROP(S): at 06:18

## 2024-10-27 RX ADMIN — Medication 1 DROP(S): at 22:02

## 2024-10-27 RX ADMIN — AMLODIPINE BESYLATE 10 MILLIGRAM(S): 10 TABLET ORAL at 06:17

## 2024-10-27 NOTE — PROGRESS NOTE ADULT - ASSESSMENT
44y/M with  large pontine hemorrhage, SAH, brain edema; ?locked-in  CVA  acute MI  acute on chronic CKD, hydronephrosis    PLAN:   NEURO: neurochecks q2h, VSq2 PRN pain meds with oxycodone   neurostim: off   palliative / ethics follow-up  off VEEG - neg  REHAB:  physical therapy evaluation and management    EARLY MOB:  bedrest HOB up    PULM:  trach collar 35%; pulmo toilette, nebs, atropine SL - taper  CARDIO:  SBP goal 100-160mm Hg,  amlodipine, EF 75%  ENDO:  Blood sugar goals 140-180 mg/dL, A1C 5.4, ISS  GI:  off PPI s/p PEG,  DIET: TF nepro  RENAL:  stable creatinine; Na goal 135-145, Vuong, doxazosin 8;  continue phosphate binder sodium zirconium; TOV on Mon  HEM/ONC:  Hb stable  VTE Prophylaxis: SCDs, SQH  ID: afebrile, no leukocytosis; off Abx  Social: will update family  MISC: palliative discussions with family; ofloxacin and erythromycin to R eye    Active issues:  What's keeping patient in the ICU? respiratory status, anisocoria  What is this patient's dispo plan? wean to trach collar and stepdown vs if unable to - will stepdown to vent bed    ATTENDING ATTESTATION:  I was physically present for the key portions of the evaluation and management (E/M) service provided.  I agree with the above history, physical and plan, which I have reviewed and edited where appropriate.    Patient at high risk for neurological deterioration or death due to:  ICU delirium, aspiration PNA, DVT / PE.  Critical care time:  I have personally provided 60 minutes of critical care time, excluding time spent on separate procedures.      Plan discussed with RN, house staff. 44y/M with  large pontine hemorrhage, SAH, brain edema; ?locked-in  CVA  acute MI  acute on chronic CKD, hydronephrosis    PLAN:   NEURO: neurochecks q4, VS4 PRN pain meds with oxycodone   neurostim: off   palliative / ethics follow-up  off VEEG - neg  REHAB:  physical therapy evaluation and management    EARLY MOB:  bedrest HOB up    PULM:  trach collar 35%; pulmo toilette, nebs, d/c atropine SL   CARDIO:  SBP goal 100-160mm Hg,  cont amlodipine, EF 75%  ENDO:  Blood sugar goals 140-180 mg/dL, A1C 5.4, ISS  GI:  off PPI s/p PEG, check FOBT (inc BUN), hold senna  DIET: TF nepro  RENAL:  stable creatinine; Na goal 135-145, Vuong, doxazosin 8;  continue phosphate binder sodium zirconium; TOV on Mon;  q6h  HEM/ONC:  Hb stable  VTE Prophylaxis: SCDs, SQH  ID: afebrile, no leukocytosis; off Abx  Social: will update family, palliative discussions with family  MISC: ofloxacin and erythromycin to R eye    Active issues:  What's keeping patient in the ICU? nothing   What is this patient's dispo plan? stepdown       ATTENDING ATTESTATION:  Patient not at high risk for neurologic deterioration / death.  Time spent on this noncritically ill patient: 55 minutes spent on total encounter, more than 50% of the visit was spent counseling and/or coordinating care by the attending physician.      Plan discussed with RN, house staff.    REVIEW OF SYSTEMS:  No headaches, no nausea or vomiting; 14 -point review of systems otherwise unremarkable.      ICU stepdown Checklist:    Completed: 10-27 @ 09:26    [X] hemodynamically stable – VS WNL and stable x 24hours, UO adequate  [n/a ] if  previously on HDA - off pressors x 24h with stable neuro exam    [X] no new symptoms x 24h (i.e. new fever, new-onset nausea/vomiting)  [X] stable labs: (i.e. WBC not rising, sodium not dropping)  [X] patient not at high risk for aspiration, if high risk then:                  [ ] should have definitive plans for trach/PEG (alternative option is to discharge from ICU to facilty)                  [ ] stepdown to bed close to nurse’s station  [n/a] low suctioning requirements (i.e. q4h or less)  [X] sign-off from primary RN* Stacy  [X] drains do not require ICU level of care  [X] if patient previously agitated or with behavioral issues – controlled   [X] pain controlled   44y/M with  large pontine hemorrhage, SAH, brain edema; ?locked-in  CVA  h/o MI  acute on chronic CKD, hydronephrosis    PLAN:   NEURO: neurochecks q4, VS4 PRN pain meds with oxycodone   neurostim: off   palliative / ethics follow-up  off VEEG - neg  REHAB:  physical therapy evaluation and management    EARLY MOB:  bedrest HOB up    PULM:  trach collar 35%; pulmo toilette, nebs, d/c atropine SL   CARDIO:  SBP goal 100-160mm Hg,  cont amlodipine, EF 75%  ENDO:  Blood sugar goals 140-180 mg/dL, A1C 5.4, ISS  GI:  off PPI s/p PEG, check FOBT (inc BUN), hold senna  DIET: TF nepro  RENAL:  stable creatinine; Na goal 135-145, Vuong, doxazosin 8;  continue phosphate binder sodium zirconium; TOV on Mon;  q6h  HEM/ONC:  Hb stable  VTE Prophylaxis: SCDs, SQH  ID: afebrile, no leukocytosis; off Abx  Social: will update family, palliative discussions with family  MISC: ofloxacin and erythromycin to R eye    Active issues:  What's keeping patient in the ICU? nothing   What is this patient's dispo plan? stepdown       ATTENDING ATTESTATION:  Patient not at high risk for neurologic deterioration / death.  Time spent on this noncritically ill patient: 55 minutes spent on total encounter, more than 50% of the visit was spent counseling and/or coordinating care by the attending physician.      Plan discussed with RN, house staff.    REVIEW OF SYSTEMS:  No headaches, no nausea or vomiting; 14 -point review of systems otherwise unremarkable.      ICU stepdown Checklist:    Completed: 10-27 @ 09:26    [X] hemodynamically stable – VS WNL and stable x 24hours, UO adequate  [n/a ] if  previously on HDA - off pressors x 24h with stable neuro exam    [X] no new symptoms x 24h (i.e. new fever, new-onset nausea/vomiting)  [X] stable labs: (i.e. WBC not rising, sodium not dropping)  [X] patient not at high risk for aspiration, if high risk then:                  [ ] should have definitive plans for trach/PEG (alternative option is to discharge from ICU to facilty)                  [ ] stepdown to bed close to nurse’s station  [n/a] low suctioning requirements (i.e. q4h or less)  [X] sign-off from primary RN* Stacy  [X] drains do not require ICU level of care  [X] if patient previously agitated or with behavioral issues – controlled   [X] pain controlled

## 2024-10-27 NOTE — PROGRESS NOTE ADULT - SUBJECTIVE AND OBJECTIVE BOX
=================================  NEUROCRITICAL CARE ATTENDING NOTE  =================================    GARETT DELEON   MRN-[id number]  Summary:  44y/M  unknown past medical history (reported stroke and MI by coworkers) presented to [facility] with AMS, Pt was working at [employer] when he was found down by coworkers. EMS called and pt brought to [facility] ED. Intubated, sedated, started on cardene for SBPs in 200s. CT head showed brain stem bleed. Transferred to NSICU for further management.  (30 Sep 2024 12:55)    COURSE IN THE HOSPITAL:  Date	POD	BD	Events	Surgery  	-	-	Transferred from [facility]. A-line placed. Versed discontinued. Hydralazine, 3% saline started. Changed propofol to precedex.	-  10/1	-	-	Stability CTH done. Added labetalol, started TF. Palliative and ethics consulted. Febrile 103°F, pan cultures sent.	-  10/2	-		Desaturation to 80s, FiO2 increased. Fentanyl given, ABG, CXR ordered. Propofol started. Vanc/CTX started. MRSA negative, vanc discontinued. Na 159, starting FW 250q6.	-  10/3	-	3	Na increasing, FW increased to 300q6. Bowel regimen discontinued. vEEG started.	-  10/4	-		Albumin bolus, increased LR. 7% hypersal started for 48hrs. Nephrology consulted for CKD. SBP 170s, hydralazine given.	-  10/5	-		Hydralazine and labetalol for SBP. RT placed for diarrhea.	-  10/6	-		FW increased to 350q4. IV Tylenol for temp 100.6°F.	-  10/7	-		Pancultured for temp 101.8°F.	-  10/8	-		Cr increase, LR decreased. Simethicone added. Increased hydralazine, labetalol. Na 145, FW 250q6. Retaining fluid, bladder scans ordered.	-  10/9	-		Abd x-ray for distention. Changed tube feeds to Jevity 1.5. Tolerating CPAP.	-  10/10	-	10	Vuong for urinary retention. Increased TF to goal. Family leaning toward trach/PEG.	-  10/11	-	11	Trach/PEG consults placed.	-  10/12	-	12	MRI brain complete.	-  10/13	-	13	Increased flomax. Hold SQH for trach.	-  10/14	-		S/p trach with pulmonary. NGT placed, CXR confirmed position.	-  10/15	-	15	Resumed feeds. Spiked fever 101°F, pan cultures sent.	-  10/16	-	16	Lokelma for K+ 5.4. Started vanc/zosyn for PNA. PEG held for fever.	-  10/17	-	17	Serum and urine osm ordered. Started sinemet, FW 100q4, IVF discontinued. MRSA negative, vanc discontinued. NGT replaced.	-  10/18	-	18	Amantadine added. Changed zosyn to unasyn for acinetobacter baumannii. Failed TOV, required SC.	-  10/19	-	19	Cardura added for retention. Decreased labetalol, hydralazine. Vuong replaced.	-  10/20	-	20	NGT dislodged, replaced. PEG scheduled for tomorrow. Lokelma for hyperkalemia.  10/21	-	21	s/p PEG  10/22	-	22	No significant events overnight. 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  trach collar 40%  10/24   -	24	No significant events overnight.   10/25   -	25	No significant events overnight.   10/26   -	26	Anisocoria overnight, given 23.4% x1, CT stable; briefly back on vent, ABG unremarkable, now back on trach collar; exam - following commands!  10/27   -	27	No significant events overnight.       Past Medical History: Acute myocardial infarction CVA (cerebral vascular accident)  Allergies:  Allergy Status Unknown  Home meds:     PHYSICAL EXAMINATION  T(C): 36.1 (10-27-24 @ 05:17), Max: 36.8 (10-26-24 @ 14:25) HR: 67 (10-27-24 @ 06:00) (59 - 81) BP: 137/85 (10-27-24 @ 06:00) (116/84 - 153/99) RR: 16 (10-27-24 @ 06:00) (8 - 24) SpO2: 96% (10-27-24 @ 06:00) (96% - 100%)  NEUROLOGIC EXAMINATION:  Patient is B eye opens spontaneously, R2 L3 brisk, (+) bilateral corneals, not FC, tracking with eyes up and down, R UE localizes, B LE withdraws, L UE trace movements, not mimicking; reflexive hand   GENERAL: trach collar 35%  EENT:  anicteric, R eye erythematous  CARDIOVASCULAR: (+) S1 S2, normal rate and regular rhythm  PULMONARY: clear to auscultation bilaterally (q4h suctioning oral secretions, coughing sputum from trach)  ABDOMEN: soft, nontender with normoactive bowel sounds  EXTREMITIES: no edema  SKIN: no rash    LABS: 10-27    (5.63)  11.6 (11.2)  6.72  )-----------( 279      ( 27 Oct 2024 05:16 )             37.3      141  |  108  |  61[H]  ----------------------------<  129[H]  4.6   |  23  |  3.01[H] (3.21)    Ca    9.4      27 Oct 2024 05:16  Phos  4.6     10-27  Mg     2.6     10-27    10-26 @ 07:01  -  10-27 @ 07:00  --------------------------------------------------------  IN: 1190 mL / OUT: 1360 mL / NET: -170 mL       Bacteriology:  10/16 sputum culture acinetobacter  10/15 Blood CS NG5D    CSF studies:  EEG:  [email or web address] 9:30 AM	CT Brain	Large pontine hemorrhage, SAH, edema, no hydrocephalus. Multiple old infarcts.  2024 1:11 PM	CTA Head/Neck	Pontine hemorrhage stable, no malformation, stable ventricular size, no stenosis or aneurysm.  2024 6:40 PM	CT Brain	Enlarged pontine hemorrhage, SAH in frontal lobes.  2024        	CT Brain	Stable pontine hemorrhage, SAH stable.  10/03/2024 11:06 AM	CT Brain	Stable hematoma, no rebleeding.  10/12/2024 9:16 AM	MRI Brain	Stable hemorrhage, no new bleeding, cerebellar infarct.    Other imaging:  Date/Time	Type of Study	Results  10/01/2024 11:49 AM	US Retroperit	No hydronephrosis; chronic renal disease; Vuong catheter.  10/01/2024 11:14 AM	XR Abdomen	Orogastric tube coiled in stomach; nonspecific bowel gas pattern.  10/07/2024 10:25 PM	XR Abdomen	Nonspecific bowel gas pattern.  10/08/2024 8:01 AM	US Retroperit	Normal kidneys; distended bladder; mild hydronephrosis.  10/10/2024 7:32 AM	XR Abdomen	Nasogastric tube; nonspecific bowel gas pattern.  10/19/2024 1:13 PM	US DPLX Veins	No DVT in lower extremities.    MEDICATIONS: 10-27    ·	heparin   Injectable 5000 SubCutaneous every 8 hours  ·	amLODIPine   Tablet 10 milliGRAM(s) Oral daily  ·	doxazosin 8 milliGRAM(s) Oral at bedtime  ·	acetylcysteine 10%  Inhalation 4 Inhalation every 12 hours  ·	albuterol/ipratropium for Nebulization 3 Nebulizer every 12 hours  ·	senna 2 Oral at bedtime  ·	artificial tears (preservative free) Ophthalmic Solution 1 Right EYE every 2 hours  ·	atropine 1% Ophthalmic Solution for SubLingual Use 2 SubLingual every 12 hours  ·	calcium acetate 667 Oral three times a day with meals  ·	erythromycin   Ointment 1 Right EYE every 6 hours  ·	ofloxacin 0.3% Solution 1 Right EYE four times a day  ·	petrolatum Ophthalmic Ointment 1 Both EYES two times a day  ·	sodium zirconium cyclosilicate 5 Oral daily  ·	acetaminophen   Oral Liquid .. 650 Oral every 6 hours PRN  ·	oxyCODONE    Solution 5 Oral every 4 hours PRN    IV FLUIDS: IVL  DRIPS:  DIET: Nepro @ 45cc/hr  Lines:   Drains:   Vuong (inserted 10/24 - straight cath 8x/day with >700cc urine output)  Wounds:    CODE STATUS:  Full Code                       GOALS OF CARE:  aggressive                      DISPOSITION:  ICU  ICH Score 3 =================================  NEUROCRITICAL CARE ATTENDING NOTE  =================================    GARETT DELEON   MRN-[id number]  Summary:  44y/M  unknown past medical history (reported stroke and MI by coworkers) presented to [facility] with AMS, Pt was working at [employer] when he was found down by coworkers. EMS called and pt brought to [facility] ED. Intubated, sedated, started on cardene for SBPs in 200s. CT head showed brain stem bleed. Transferred to NSICU for further management.  (30 Sep 2024 12:55)    COURSE IN THE HOSPITAL:  Date	POD	BD	Events	Surgery  	-	-	Transferred from [facility]. A-line placed. Versed discontinued. Hydralazine, 3% saline started. Changed propofol to precedex.	-  10/1	-	-	Stability CTH done. Added labetalol, started TF. Palliative and ethics consulted. Febrile 103°F, pan cultures sent.	-  10/2	-		Desaturation to 80s, FiO2 increased. Fentanyl given, ABG, CXR ordered. Propofol started. Vanc/CTX started. MRSA negative, vanc discontinued. Na 159, starting FW 250q6.	-  10/3	-	3	Na increasing, FW increased to 300q6. Bowel regimen discontinued. vEEG started.	-  10/4	-		Albumin bolus, increased LR. 7% hypersal started for 48hrs. Nephrology consulted for CKD. SBP 170s, hydralazine given.	-  10/5	-		Hydralazine and labetalol for SBP. RT placed for diarrhea.	-  10/6	-		FW increased to 350q4. IV Tylenol for temp 100.6°F.	-  10/7	-		Pancultured for temp 101.8°F.	-  10/8	-		Cr increase, LR decreased. Simethicone added. Increased hydralazine, labetalol. Na 145, FW 250q6. Retaining fluid, bladder scans ordered.	-  10/9	-		Abd x-ray for distention. Changed tube feeds to Jevity 1.5. Tolerating CPAP.	-  10/10	-	10	Vuong for urinary retention. Increased TF to goal. Family leaning toward trach/PEG.	-  10/11	-	11	Trach/PEG consults placed.	-  10/12	-	12	MRI brain complete.	-  10/13	-	13	Increased flomax. Hold SQH for trach.	-  10/14	-		S/p trach with pulmonary. NGT placed, CXR confirmed position.	-  10/15	-	15	Resumed feeds. Spiked fever 101°F, pan cultures sent.	-  10/16	-	16	Lokelma for K+ 5.4. Started vanc/zosyn for PNA. PEG held for fever.	-  10/17	-	17	Serum and urine osm ordered. Started sinemet, FW 100q4, IVF discontinued. MRSA negative, vanc discontinued. NGT replaced.	-  10/18	-	18	Amantadine added. Changed zosyn to unasyn for acinetobacter baumannii. Failed TOV, required SC.	-  10/19	-	19	Cardura added for retention. Decreased labetalol, hydralazine. Vuong replaced.	-  10/20	-	20	NGT dislodged, replaced. PEG scheduled for tomorrow. Lokelma for hyperkalemia.  10/21	-	21	s/p PEG  10/22	-	22	No significant events overnight. 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  trach collar 40%  10/24   -	24	No significant events overnight.   10/25   -	25	No significant events overnight.   10/26   -	26	Anisocoria overnight, given 23.4% x1, CT stable; briefly back on vent, ABG unremarkable, now back on trach collar; exam - following commands!  10/27   -	27	No significant events overnight.     Past Medical History: Acute myocardial infarction CVA (cerebral vascular accident)  Allergies:  Allergy Status Unknown  Home meds:     PHYSICAL EXAMINATION  T(C): 36.1 (10-27-24 @ 05:17), Max: 36.8 (10-26-24 @ 14:25) HR: 67 (10-27-24 @ 06:00) (59 - 81) BP: 137/85 (10-27-24 @ 06:00) (116/84 - 153/99) RR: 16 (10-27-24 @ 06:00) (8 - 24) SpO2: 96% (10-27-24 @ 06:00) (96% - 100%)  NEUROLOGIC EXAMINATION:  Patient is B eye opens spontaneously, R 6 sluggish L 4 brisk; FC (2 fingers, wiggles toes), tracking with eyes up and down, R UE localizes, B LE withdraws, L UE trace movements, not mimicking; reflexive hand   GENERAL: trach collar 35%  EENT:  anicteric, R eye erythema   CARDIOVASCULAR: (+) S1 S2, normal rate and regular rhythm  PULMONARY: clear to auscultation bilaterally (q4h suctioning oral secretions, coughing sputum from trach)  ABDOMEN: soft, nontender with normoactive bowel sounds  EXTREMITIES: no edema  SKIN: no rash    LABS: 10-27    (5.63)  11.6 (11.2)  6.72  )-----------( 279      ( 27 Oct 2024 05:16 )             37.3      141  |  108  |  61[H]  ----------------------------<  129[H]  4.6   |  23  |  3.01[H] (3.21)    Ca    9.4      27 Oct 2024 05:16  Phos  4.6     10-27  Mg     2.6     10-27    10-26 @ 07:01  -  10-27 @ 07:00  --------------------------------------------------------  IN: 1190 mL / OUT: 1360 mL / NET: -170 mL       Bacteriology:  10/16 sputum culture acinetobacter  10/15 Blood CS NG5D    CSF studies:  EEG:  [email or web address] 9:30 AM	CT Brain	Large pontine hemorrhage, SAH, edema, no hydrocephalus. Multiple old infarcts.  2024 1:11 PM	CTA Head/Neck	Pontine hemorrhage stable, no malformation, stable ventricular size, no stenosis or aneurysm.  2024 6:40 PM	CT Brain	Enlarged pontine hemorrhage, SAH in frontal lobes.  2024        	CT Brain	Stable pontine hemorrhage, SAH stable.  10/03/2024 11:06 AM	CT Brain	Stable hematoma, no rebleeding.  10/12/2024 9:16 AM	MRI Brain	Stable hemorrhage, no new bleeding, cerebellar infarct.    Other imaging:  Date/Time	Type of Study	Results  10/01/2024 11:49 AM	US Retroperit	No hydronephrosis; chronic renal disease; Vuong catheter.  10/01/2024 11:14 AM	XR Abdomen	Orogastric tube coiled in stomach; nonspecific bowel gas pattern.  10/07/2024 10:25 PM	XR Abdomen	Nonspecific bowel gas pattern.  10/08/2024 8:01 AM	US Retroperit	Normal kidneys; distended bladder; mild hydronephrosis.  10/10/2024 7:32 AM	XR Abdomen	Nasogastric tube; nonspecific bowel gas pattern.  10/19/2024 1:13 PM	US DPLX Veins	No DVT in lower extremities.    MEDICATIONS: 10-27    ·	heparin   Injectable 5000 SubCutaneous every 8 hours  ·	amLODIPine   Tablet 10 milliGRAM(s) Oral daily  ·	doxazosin 8 milliGRAM(s) Oral at bedtime  ·	acetylcysteine 10%  Inhalation 4 Inhalation every 12 hours  ·	albuterol/ipratropium for Nebulization 3 Nebulizer every 12 hours  ·	senna 2 Oral at bedtime  ·	artificial tears (preservative free) Ophthalmic Solution 1 Right EYE every 2 hours  ·	atropine 1% Ophthalmic Solution for SubLingual Use 2 SubLingual every 12 hours  ·	calcium acetate 667 Oral three times a day with meals  ·	erythromycin   Ointment 1 Right EYE every 6 hours  ·	ofloxacin 0.3% Solution 1 Right EYE four times a day  ·	petrolatum Ophthalmic Ointment 1 Both EYES two times a day  ·	sodium zirconium cyclosilicate 5 Oral daily  ·	acetaminophen   Oral Liquid .. 650 Oral every 6 hours PRN  ·	oxyCODONE    Solution 5 Oral every 4 hours PRN    IV FLUIDS: IVL  DRIPS:  DIET: Nepro @ 45cc/hr  Lines:   Drains:   Vuong (inserted 10/24 - straight cath 8x/day with >700cc urine output)  Wounds:    CODE STATUS:  Full Code                       GOALS OF CARE:  aggressive                      DISPOSITION:  ICU  ICH Score 3

## 2024-10-27 NOTE — PROGRESS NOTE ADULT - SUBJECTIVE AND OBJECTIVE BOX
HPI:   43 yo male, unknown past medical history (reported stroke and MI by coworkers) presented to [facility] with AMS, Pt was working at [employer] when he was found down by coworkers. EMS called and pt brought to [facility] ED. Intubated, sedated, started on cardene for SBPs in 200s. CT head showed brain stem bleed. (NIHSS 33, ICH score 3).  Transferred to NSICU for further management.       Hospital Course:   9/30: transferred from [facility]. A line placed. Versed dc'd. Cy Rader at bedside, states pt has family in [place], cannot confirm medications or PMH other than stroke and MI. 250cc bolus 3% given. LR switched to NS. hydralazine 25q8 started, 3% started, switched propofol to precedex   10/1: stability CTH done. Added labetalol, started TF. Palliative consulted. ethics consulted to determine surrogate. febrile 103, pan cx sent  10/2: BD 2, GEORGE overnight. TF resumed. Desatt'd to 80s, FiO2 inc. to 50. Fentanyl given, ABG, CXR ordered. Maxxed on precedex, started on propofol for DARIEN -4 - -5. Precedex dc'd. Duonebs, mucomyst, hypertonic added. 3% dc'd. Cardene dc'd. Start vanc/CTX. Increased labetalol 200q8. MRSA negative, dc'd vanc. ETT pulled back 2cm x 2, good positioning after confirmatory chest xray. Ethics attempting to establish HCP with family. Na 159, starting FW 250q6 for range 150-155.   10/3: BD3, GEORGE o/n, neuro stable. Na elevating, FW increased to 300q6. Dc'd bowel reg for diarrhea. vEEG started. SQH 5000q8 tonight.   10/4: BD 4, albumn bolus, incr. LR to 80 2/2 incr. in Cr, LR to 100cc/hr for uptrending Cr. Started 7% hypersal for 48hrs and SL atropine for inline/oral thick secretions. Dc'd CTX and started ancef for MSSA in the sputum. Nephrology consulted for CKD, f/u recs. SBP 170s, given hydralazine 10mg IVP.   10/5: BD5, o/n 10mg IVP hydralazine given for SBP 170s and started on hydralazine 25q8 via OGT. 10mg IV push labetalol for SBP > 160s. RT placed for diarrhea.   10/6: BD6, o/n FW increased to 350q4 per nephrology recs. IV tylenol for temp 100.6, SBp 160s presumed uncomfortable.   10/7: BD7, overnight pancultured for temp 101.8F.   10/8: BD8. GEORGE. Cr bumped. decreased LR to 75cc/hr. Adding simethicone ATC. incr hydralazine 50mgTID. Incr labetalol 300mgTID. Na 145, decreased FWF to 250q6. Start precedex. FENa consistent with intrinsic kidney injury. Pend repeat renal US. Retaining up to 1.3L, bladder scans q6, straight cath PRN  10/9: BD 9. GEORGE overnight. Neuro stable. abd xray for distention w non-specific gas pattern, OGT to LIWS for morning. duonebs/mucomyst to q8 for improving secretions. Changed tube feeds to Jevity 1.5 20cc/hr, low rate due to abdominal distention, nepro dense and more difficult to digest. Tolerating CPAP, confirmed by ABG.   10/10: BD 10. GEORGE overnight. Neuro stable. (+) gabriel for urinary retention on bladder scan. inc TF to goal rate of 40cc/hr. family leaning toward pursuing trach/PEG. 1/2 amp for FS 81.   10/11: BD 11. GEORGE overnight. Neuro stable. Trach/PEG consults placed.   10/12: BD 12. GEORGE overnight. Neuro stable. MRI brain complete.   10/13: BD 13. Increase flomax. Hold SQH after PM dose for trach tm. IVL.   10/14: BD 14. GEORGE overnight, remains on AC/VC. Gabriel placed for urinary retention. Dc'd free water.  S/p trach with pulm. NGT placed and CXR confirmed in good position.   10/15: BD 15, GEORGE ovn. resumed feeds. spiked 101, pan cx sent.   10/16: BD 16. GEORGE ovn. Lokelma 5mg for K+ 5.4. Started vanc q 24/zosyn for empiric PNA coverage, IVF to 100/hr. PEG held for fever.   10/17: BD 17,  ordered serum osm and urine osm for am. Started sinemet for neurostimulation. Increased cardura to 0.8. Started FW 100q4, dc'd IVF. MRSA negative, dc'd vanc. NGT replaced d/t coiling.   10/18: BD 18, GEORGE overnight, neuro stable. Amantadine added for neurostim. zosyn changed to unasyn for acinetobacter baumannii, failed TOV and required SC  10/19: BD 19, GEORGE ovn. cardura 2mg added for retention. labetalol decreased 200q8, hydralazine decreased 25q8. Gabriel replaced.   10/20: BD20, GEORGE overnight. NGT dislodged, replaced. PEG tomorrow w/ gen surg, FW increased to 150q4 and labetalol decreased to 100q8, lokelma given for hyperkalemia.   10/21: BD 21. POD0 PEG placement with Gen surg. decr labetolol to 50q8, incr. cardura to 0.4, started lokelma and phoslo, dc gabriel POD0 PEG placement with Gen surg.  10/22: BD 22. Plan to start TF today via PEG. dc labetalol, Following ophtho recs. Increased apnea settings - found to be in cheyne-moe respiration. CPAP 5/5.  10/23: BD 23. hydralazine d/c'd, trach collar trial today. Rectal tube placed at 6am.  10/24: BD24, o/n lokelma held due to diarrhea. Free water 100q6 resumed. dc'd tamsulosin, amantadine. Incr'd cardura to 8mg qhs. Dc'd FW. Switched jevity to nepro. gabriel placed for high urine output. Started SL atropine for oral secretions. Dc'd free water.  10/25: BD25, o/n decreased suctioning requirements to > q4hrs, GEORGE. Cr improving, cont phoslo, lokelma held at this time. Gabriel placed yest, cont. Tolerating trach collar. Given 500cc plasmalyte bolus for ANIKA. Dc'd sinemet.   10/26: BD26, o/n resumed lokelma 5mg daily and resumed 100cc free water q6hrs. Change in neuro status with new right pupillary dilation with anisocoria (right pupil 6mm fixed and left pupil 3mm briskly reactive). Given 23.4% NaCl bullet, taken for emergent CTH showing mostly resolved pontine hemorrhage, continued brainstem hypodensity likely edema d/t hemorrhage, no new hemorrhage or infarct, no herniation, mild increase in size of left lateral ventricle. Vitals remaine stable. Na goal > 140.   10/27: BD27, o/n GEORGE.Neuro stable. Pend stepdown with airway bed.     Vital Signs Last 24 Hrs  T(C): 36.2 (27 Oct 2024 00:33), Max: 36.8 (26 Oct 2024 14:25)  T(F): 97.2 (27 Oct 2024 00:33), Max: 98.2 (26 Oct 2024 14:25)  HR: 64 (27 Oct 2024 00:34) (63 - 82)  BP: 123/86 (26 Oct 2024 23:00) (123/86 - 160/104)  BP(mean): 100 (26 Oct 2024 23:00) (100 - 125)  RR: 16 (27 Oct 2024 00:34) (8 - 24)  SpO2: 97% (27 Oct 2024 00:34) (96% - 100%)    Parameters below as of 27 Oct 2024 00:34  Patient On (Oxygen Delivery Method): tracheostomy collar  O2 Flow (L/min): 10  O2 Concentration (%): 35    I&O's Detail    25 Oct 2024 07:01  -  26 Oct 2024 07:00  --------------------------------------------------------  IN:    Enteral Tube Flush: 120 mL    Free Water: 200 mL    multiple electrolytes Injection Type 1 Bolus: 500 mL    multiple electrolytes Injection Type 1.: 480 mL    Nepro with Carb Steady: 1035 mL  Total IN: 2335 mL    OUT:    Intermittent Catheterization - Urethral (mL): 1605 mL    Rectal Tube (mL): 150 mL  Total OUT: 1755 mL    Total NET: 580 mL      26 Oct 2024 07:01  -  27 Oct 2024 00:37  --------------------------------------------------------  IN:    Free Water: 200 mL    Nepro with Carb Steady: 810 mL  Total IN: 1010 mL    OUT:    Intermittent Catheterization - Urethral (mL): 945 mL  Total OUT: 945 mL    Total NET: 65 mL        I&O's Summary    25 Oct 2024 07:01  -  26 Oct 2024 07:00  --------------------------------------------------------  IN: 2335 mL / OUT: 1755 mL / NET: 580 mL    26 Oct 2024 07:01  -  27 Oct 2024 00:37  --------------------------------------------------------  IN: 1010 mL / OUT: 945 mL / NET: 65 mL        PHYSICAL EXAM:  General: NAD, pt is comfortably resting in bed, unable to assess orientation status, [place] speaking   HEENT: tracking vertically only, attempts to track to voice (right > left), right pupil 6mm sluggish, left pupil 3mm briskly reactive, +L corneal, +cough/gag, face symmetric, attempts to stick out tongue to command   Cardiovascular: RRR, normal S1 and S2   Respiratory: lungs CTAB, no wheezing, rhonchi, or crackles, +ET tube (trach collar)   GI: normoactive BS to auscultation, abd soft, NTND, +PEG and abdominal binder   Neuro: no verbal output, wiggled toes to command on right foot, shows 2 fingers with right hand, DARRELL/LL trace mvmt spont and to noxious   unable to assess sensation due to mental status     TUBES/LINES:  [] CVC  [] A-line  [] Lumbar Drain  [] Ventriculostomy  [] Other    DIET:  [] NPO  [] Mechanical  [X] Tube feeds    LABS:    Urinalysis Basic - ( 26 Oct 2024 05:30 )    Color: x / Appearance: x / SG: x / pH: x  Gluc: 127 mg/dL / Ketone: x  / Bili: x / Urobili: x   Blood: x / Protein: x / Nitrite: x   Leuk Esterase: x / RBC: x / WBC x   Sq Epi: x / Non Sq Epi: x / Bacteria: x          CAPILLARY BLOOD GLUCOSE          Drug Levels: [] N/A    CSF Analysis: [] N/A      Allergies    Allergy Status Unknown    Intolerances      MEDICATIONS:  Antibiotics:    Neuro:  acetaminophen   Oral Liquid .. 650 milliGRAM(s) Oral every 6 hours PRN  oxyCODONE    Solution 5 milliGRAM(s) Oral every 4 hours PRN    Anticoagulation:  heparin   Injectable 5000 Unit(s) SubCutaneous every 8 hours    OTHER:  acetylcysteine 10%  Inhalation 4 milliLiter(s) Inhalation every 12 hours  albuterol/ipratropium for Nebulization 3 milliLiter(s) Nebulizer every 12 hours  amLODIPine   Tablet 10 milliGRAM(s) Oral daily  artificial tears (preservative free) Ophthalmic Solution 1 Drop(s) Right EYE every 2 hours  atropine 1% Ophthalmic Solution for SubLingual Use 2 Drop(s) SubLingual every 12 hours  chlorhexidine 2% Cloths 1 Application(s) Topical <User Schedule>  doxazosin 8 milliGRAM(s) Oral at bedtime  erythromycin   Ointment 1 Application(s) Right EYE every 6 hours  ofloxacin 0.3% Solution 1 Drop(s) Right EYE four times a day  petrolatum Ophthalmic Ointment 1 Application(s) Both EYES two times a day  senna 2 Tablet(s) Oral at bedtime  sodium zirconium cyclosilicate 5 Gram(s) Oral daily    IVF:  calcium acetate 667 milliGRAM(s) Oral three times a day with meals    CULTURES:  Culture Results:   Mixed gram negative rods including  Moderate Acinetobacter baumannii/nosocomialis group (10-16 @ 00:13)  Culture Results:   No growth at 5 days (10-15 @ 14:55)    RADIOLOGY & ADDITIONAL TESTS:      ASSESSMENT:  43 y/o PMH ?stroke/MI present to [facility] after collapsing at work. Decorticate posturing, vomiting, intubated for airway protection. 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 Transferred to [facility] for further management. s/p trach 10/14. s/p peg 10/21.        AMS    Handoff    Acute myocardial infarction    CVA (cerebral vascular accident)    Intracerebral hemorrhage of brain stem    Brainstem stroke    Brain stem stroke syndrome    Brain stem hemorrhage    Brain stem stroke syndrome    Hemorrhagic stroke    Brainstem stroke    Encephalopathy acute    Functional quadriplegia    Advanced care planning/counseling discussion    Encounter for palliative care    Percutaneous tracheal puncture    Altered mental status examination    EGD, with PEG    AMS    [email or web address]        PLAN:  Neuro:  - Neuro/vitals q4hr   - pain control: Tylenol prn, oxy prn  - CTH 9/30: enlarged pontine hemorrhage, CTH 10/3: read stable, CTH 10/25 anisocoric pupils (R sluggish 6mm compared to left 3mm briskly reactive); showing mostly resolved pontine hemorrhage, continued brainstem hypodensity likely edema d/t hemorrhage, no new hemorrhage or infarct, no herniation, mild increase in size of left lateral ventricle  - vEEG (10/3-4)- negative, (10/17-10/19) - negative  - stroke core measures, stroke neuro signed off  - MRI brain w/ w/o contrast 10/12: parenchymal hemorrhage, acute/subacute R cerebellar stroke    - sinemet stopped 10/25     CV:  - -160  - HTN: amlodipine 10 mg qd  - echo (9/30) EF 75%    Resp:  - tolerating trach collar 40%  - duonebs/mucomyst q12h, sublingual atropine q8hrs for oral secretions, suctionig less frequent     GI:  - Nepro TF via PEG (placed 10/21 by gen surg)  - last BM 10/23, RT removed   - senna added for bowel regimen     Renal:  - FW flushes 100cc q6hrs   - hyperK 10/20: lokelma dc'd (10/21 -10/23), resumed 10/25 for hyperkalemia   - hyperPhos: phoslo 667mg TID (10/21 -)  - Urinary retenion: Cardura 8 mg   - gabriel (10/14-10/18), (10/19-22), gabriel replaced 10/24-   - CKD: trend Cr  - Na goal > 140   - renal US 10/1: echogenicity c/w chronic med renal dz, repeat 10/8: inc renal echogeicity, c/f medical renal disease w increased hydro     Endo:  - A1c 5.4    Heme:  - DVT ppx: SCDs, SQH 5000u q8h     ID:  - 10/15 sputum +actinobacter baumanii, s/p unasyn (10/18-10/23)  - MRSA swab negative (10/2), sputum MSSA + , s/p 1 dose vanc (10/2), CTX (10/2 - 10/4), Ancef (10/4-10/14)     MISC:  - ophtho consult for keratitis, rec erythromycin ointment to rt eye q4hrs, ofloxacin ointment to rt eye QID, artificial tears to rt eye q2hrs, moisture chamber at bedtime    Dispo: NSICU, full code, pending placement and emergency medicaid     D/w Dr. D'Amico and Dr. Tomlin

## 2024-10-28 DIAGNOSIS — R33.8 OTHER RETENTION OF URINE: ICD-10-CM

## 2024-10-28 DIAGNOSIS — R13.19 OTHER DYSPHAGIA: ICD-10-CM

## 2024-10-28 DIAGNOSIS — N17.9 ACUTE KIDNEY FAILURE, UNSPECIFIED: ICD-10-CM

## 2024-10-28 DIAGNOSIS — I61.3 NONTRAUMATIC INTRACEREBRAL HEMORRHAGE IN BRAIN STEM: ICD-10-CM

## 2024-10-28 DIAGNOSIS — J96.10 CHRONIC RESPIRATORY FAILURE, UNSPECIFIED WHETHER WITH HYPOXIA OR HYPERCAPNIA: ICD-10-CM

## 2024-10-28 LAB
ANION GAP SERPL CALC-SCNC: 13 MMOL/L — SIGNIFICANT CHANGE UP (ref 5–17)
BUN SERPL-MCNC: 65 MG/DL — HIGH (ref 7–23)
CALCIUM SERPL-MCNC: 9.4 MG/DL — SIGNIFICANT CHANGE UP (ref 8.4–10.5)
CHLORIDE SERPL-SCNC: 109 MMOL/L — HIGH (ref 96–108)
CO2 SERPL-SCNC: 21 MMOL/L — LOW (ref 22–31)
CREAT SERPL-MCNC: 3.28 MG/DL — HIGH (ref 0.5–1.3)
EGFR: 23 ML/MIN/1.73M2 — LOW
EGFR: 23 ML/MIN/1.73M2 — LOW
GLUCOSE SERPL-MCNC: 122 MG/DL — HIGH (ref 70–99)
HCT VFR BLD CALC: 37.2 % — LOW (ref 39–50)
HGB BLD-MCNC: 11.4 G/DL — LOW (ref 13–17)
MAGNESIUM SERPL-MCNC: 2.5 MG/DL — SIGNIFICANT CHANGE UP (ref 1.6–2.6)
MCHC RBC-ENTMCNC: 28.8 PG — SIGNIFICANT CHANGE UP (ref 27–34)
MCHC RBC-ENTMCNC: 30.6 GM/DL — LOW (ref 32–36)
MCV RBC AUTO: 93.9 FL — SIGNIFICANT CHANGE UP (ref 80–100)
NRBC # BLD: 0 /100 WBCS — SIGNIFICANT CHANGE UP (ref 0–0)
NRBC BLD-RTO: 0 /100 WBCS — SIGNIFICANT CHANGE UP (ref 0–0)
PHOSPHATE SERPL-MCNC: 4.1 MG/DL — SIGNIFICANT CHANGE UP (ref 2.5–4.5)
PLATELET # BLD AUTO: 293 K/UL — SIGNIFICANT CHANGE UP (ref 150–400)
POTASSIUM SERPL-MCNC: 4.8 MMOL/L — SIGNIFICANT CHANGE UP (ref 3.5–5.3)
POTASSIUM SERPL-SCNC: 4.8 MMOL/L — SIGNIFICANT CHANGE UP (ref 3.5–5.3)
RBC # BLD: 3.96 M/UL — LOW (ref 4.2–5.8)
RBC # FLD: 13 % — SIGNIFICANT CHANGE UP (ref 10.3–14.5)
SODIUM SERPL-SCNC: 143 MMOL/L — SIGNIFICANT CHANGE UP (ref 135–145)
WBC # BLD: 6.88 K/UL — SIGNIFICANT CHANGE UP (ref 3.8–10.5)
WBC # FLD AUTO: 6.88 K/UL — SIGNIFICANT CHANGE UP (ref 3.8–10.5)

## 2024-10-28 PROCEDURE — 99232 SBSQ HOSP IP/OBS MODERATE 35: CPT

## 2024-10-28 PROCEDURE — 99223 1ST HOSP IP/OBS HIGH 75: CPT

## 2024-10-28 RX ORDER — OXYCODONE HYDROCHLORIDE 30 MG/1
5 TABLET ORAL EVERY 4 HOURS
Refills: 0 | Status: DISCONTINUED | OUTPATIENT
Start: 2024-10-28 | End: 2024-10-28

## 2024-10-28 RX ORDER — POLYETHYLENE GLYCOL 3350 17 G/17G
17 POWDER, FOR SOLUTION ORAL DAILY
Refills: 0 | Status: DISCONTINUED | OUTPATIENT
Start: 2024-10-28 | End: 2024-10-29

## 2024-10-28 RX ADMIN — AMLODIPINE BESYLATE 10 MILLIGRAM(S): 10 TABLET ORAL at 05:50

## 2024-10-28 RX ADMIN — Medication 667 MILLIGRAM(S): at 14:12

## 2024-10-28 RX ADMIN — ERYTHROMYCIN 1 APPLICATION(S): 5 OINTMENT OPHTHALMIC at 23:06

## 2024-10-28 RX ADMIN — OFLOXACIN 1 DROP(S): 3 SOLUTION OPHTHALMIC at 05:51

## 2024-10-28 RX ADMIN — IPRATROPIUM BROMIDE AND ALBUTEROL SULFATE 3 MILLILITER(S): .5; 2.5 SOLUTION RESPIRATORY (INHALATION) at 05:50

## 2024-10-28 RX ADMIN — HEPARIN SODIUM 5000 UNIT(S): 1000 INJECTION INTRAVENOUS; SUBCUTANEOUS at 05:49

## 2024-10-28 RX ADMIN — HEPARIN SODIUM 5000 UNIT(S): 1000 INJECTION INTRAVENOUS; SUBCUTANEOUS at 22:14

## 2024-10-28 RX ADMIN — Medication 1 DROP(S): at 03:06

## 2024-10-28 RX ADMIN — OFLOXACIN 1 DROP(S): 3 SOLUTION OPHTHALMIC at 23:04

## 2024-10-28 RX ADMIN — ERYTHROMYCIN 1 APPLICATION(S): 5 OINTMENT OPHTHALMIC at 12:38

## 2024-10-28 RX ADMIN — Medication 1 DROP(S): at 22:15

## 2024-10-28 RX ADMIN — Medication 1 DROP(S): at 14:12

## 2024-10-28 RX ADMIN — Medication 1 DROP(S): at 05:51

## 2024-10-28 RX ADMIN — HEPARIN SODIUM 5000 UNIT(S): 1000 INJECTION INTRAVENOUS; SUBCUTANEOUS at 14:12

## 2024-10-28 RX ADMIN — Medication 1 DROP(S): at 19:42

## 2024-10-28 RX ADMIN — ERYTHROMYCIN 1 APPLICATION(S): 5 OINTMENT OPHTHALMIC at 00:52

## 2024-10-28 RX ADMIN — SODIUM ZIRCONIUM CYCLOSILICATE 5 GRAM(S): 5 POWDER, FOR SUSPENSION ORAL at 14:12

## 2024-10-28 RX ADMIN — ERYTHROMYCIN 1 APPLICATION(S): 5 OINTMENT OPHTHALMIC at 05:51

## 2024-10-28 RX ADMIN — ACETYLCYSTEINE 4 MILLILITER(S): 200 INHALANT RESPIRATORY (INHALATION) at 19:49

## 2024-10-28 RX ADMIN — ERYTHROMYCIN 1 APPLICATION(S): 5 OINTMENT OPHTHALMIC at 19:43

## 2024-10-28 RX ADMIN — DOXAZOSIN MESYLATE 8 MILLIGRAM(S): 8 TABLET ORAL at 22:15

## 2024-10-28 RX ADMIN — OFLOXACIN 1 DROP(S): 3 SOLUTION OPHTHALMIC at 12:39

## 2024-10-28 RX ADMIN — OFLOXACIN 1 DROP(S): 3 SOLUTION OPHTHALMIC at 00:52

## 2024-10-28 RX ADMIN — IPRATROPIUM BROMIDE AND ALBUTEROL SULFATE 3 MILLILITER(S): .5; 2.5 SOLUTION RESPIRATORY (INHALATION) at 19:49

## 2024-10-28 RX ADMIN — Medication 1 APPLICATION(S): at 19:49

## 2024-10-28 RX ADMIN — Medication 667 MILLIGRAM(S): at 20:14

## 2024-10-28 RX ADMIN — ACETYLCYSTEINE 4 MILLILITER(S): 200 INHALANT RESPIRATORY (INHALATION) at 05:50

## 2024-10-28 RX ADMIN — OFLOXACIN 1 DROP(S): 3 SOLUTION OPHTHALMIC at 19:42

## 2024-10-28 NOTE — CONSULT NOTE ADULT - PROBLEM SELECTOR RECOMMENDATION 5
S/p PEG with surgery 10/21/24  - TF per nutrition  - aspiration precautions and elevation of HOB
Following for complex medical decision making and support. Will continue to follow.

## 2024-10-28 NOTE — CONSULT NOTE ADULT - PROBLEM SELECTOR RECOMMENDATION 3
Baseline unclear but suspect underlying CKD from uncontrolled HTN, US renal with chronic medical renal disease. Hospital course c/b ATN.   - baseline creat appears 3-3.5 though freq outliers  - persistent hyperkalemia now on lokelma 5g qd  - persistent hyperphosphatemia now on phoslo 776 mg TID  - strict I/O, renally dose medications, avoid nephrotoxins  - pending TOV, monitor bladder scans closely given prior ultrasound imaging with mild hydro, low threshold to replace gabriel
PSPV 10%  - Supportive care

## 2024-10-28 NOTE — CONSULT NOTE ADULT - PROBLEM SELECTOR RECOMMENDATION 2
Hypertensive emergency req nicardipine gtt in NSICU. Unclear PMH.   - cont amlodipine 10mg
Collapsed and found unresponsive at work. Now off sedation and still unresponsive.  - Supportive care

## 2024-10-28 NOTE — CONSULT NOTE ADULT - PROBLEM SELECTOR RECOMMENDATION 4
- Ethics following to establish surrogate decision maker  - Poor prognosis. Will f/u once surrogate is established
S/p percutaneous trach by pulm on 10/14/24  - currently on trach collar   - cont routine trach care, frequent suctioning  - duo-neb with mucomyst q6hr PRN  - strong pulm hygiene with chest PT BID

## 2024-10-28 NOTE — CONSULT NOTE ADULT - ASSESSMENT
44 YOF with unclear PMH (?stroke, MI) who was found down at work, intubated for airway protection and found to have acute parenchymal hemorrhage within nabil with mass effect (+ acute/subacute right cerebellar infarct) in setting of hypertensive emergency, transferred to [facility] for further neurosurgical care. Hospital course c/b poor neurologic recovery s/p trach-PEG, AUR s/p gabriel, ANIKA on CKD c/b hyperkalemia, HAP s/p amp-sulbactam (EOT 10/23).

## 2024-10-28 NOTE — CONSULT NOTE ADULT - PROBLEM SELECTOR RECOMMENDATION 6
Gabriel placed 10/24 for urinary retention  - doxazosin 8mg   - pending TOV, monitor bladder scans closely given prior ultrasound imaging with mild hydro, low threshold to replace gabriel

## 2024-10-28 NOTE — PROGRESS NOTE ADULT - SUBJECTIVE AND OBJECTIVE BOX
HPI:  Unknown age male, unknown past medical history (reported stroke and MI by coworkers) presented to [facility] with AMS, Pt was working at [employer] when he was found down by coworkers. EMS called and pt brought to [facility] ED. Intubated, sedated, started on cardene for SBPs in 200s. CT head showed brain stem bleed. Transferred to NSICU for further management.  (30 Sep 2024 12:55)    OVERNIGHT EVENTS:BD 28. GEORGE overnight. Neuro stable.     Hospital Course:  9/30: transferred from [facility]. A line placed. Versed dc'd. Cy Rader at bedside, states pt has family in [place], cannot confirm medications or PMH other than stroke and MI. 250cc bolus 3% given. LR switched to NS. hydralazine 25q8 started, 3% started, switched propofol to precedex   10/1: stability CTH done. Added labetalol, started TF. Palliative consulted. ethics consulted to determine surrogate. febrile 103, pan cx sent  10/2: BD 2, GEORGE overnight. TF resumed. Desatt'd to 80s, FiO2 inc. to 50. Fentanyl given, ABG, CXR ordered. Maxxed on precedex, started on propofol for DARIEN -4 - -5. Precedex dc'd. Duonebs, mucomyst, hypertonic added. 3% dc'd. Cardene dc'd. Start vanc/CTX. Increased labetalol 200q8. MRSA negative, dc'd vanc. ETT pulled back 2cm x 2, good positioning after confirmatory chest xray. Ethics attempting to establish HCP with family. Na 159, starting FW 250q6 for range 150-155.   10/3: BD3, GEORGE o/n, neuro stable. Na elevating, FW increased to 300q6. Dc'd bowel reg for diarrhea. vEEG started. SQH 5000q8 tonight.   10/4: BD 4, albumn bolus, incr. LR to 80 2/2 incr. in Cr, LR to 100cc/hr for uptrending Cr. Started 7% hypersal for 48hrs and SL atropine for inline/oral thick secretions. Dc'd CTX and started ancef for MSSA in the sputum. Nephrology consulted for CKD, f/u recs. SBP 170s, given hydralazine 10mg IVP.   10/5: BD5, o/n 10mg IVP hydralazine given for SBP 170s and started on hydralazine 25q8 via OGT. 10mg IV push labetalol for SBP > 160s. RT placed for diarrhea.   10/6: BD6, o/n FW increased to 350q4 per nephrology recs. IV tylenol for temp 100.6, SBp 160s presumed uncomfortable.   10/7: BD7, overnight pancultured for temp 101.8F.   10/8: BD8. GEORGE. Cr bumped. decreased LR to 75cc/hr. Adding simethicone ATC. incr hydralazine 50mgTID. Incr labetalol 300mgTID. Na 145, decreased FWF to 250q6. Start precedex. FENa consistent with intrinsic kidney injury. Pend repeat renal US. Retaining up to 1.3L, bladder scans q6, straight cath PRN  10/9: BD 9. GEORGE overnight. Neuro stable. abd xray for distention w non-specific gas pattern, OGT to LIWS for morning. duonebs/mucomyst to q8 for improving secretions. Changed tube feeds to Jevity 1.5 20cc/hr, low rate due to abdominal distention, nepro dense and more difficult to digest. Tolerating CPAP, confirmed by ABG.   10/10: BD 10. GEORGE overnight. Neuro stable. (+) gabriel for urinary retention on bladder scan. inc TF to goal rate of 40cc/hr. family leaning toward pursuing trach/PEG. 1/2 amp for FS 81.   10/11: BD 11. GEORGE overnight. Neuro stable. Trach/PEG consults placed.   10/12: BD 12. GEORGE overnight. Neuro stable. MRI brain complete.   10/13: BD 13. Increase flomax. Hold SQH after PM dose for trach tm. IVL.   10/14: BD 14. GEORGE overnight, remains on AC/VC. Gabriel placed for urinary retention. Dc'd free water.  S/p trach with pulm. NGT placed and CXR confirmed in good position.   10/15: BD 15, GEORGE ovn. resumed feeds. spiked 101, pan cx sent.   10/16: BD 16. GEORGE ovn. Lokelma 5mg for K+ 5.4. Started vanc q 24/zosyn for empiric PNA coverage, IVF to 100/hr. PEG held for fever.   10/17: BD 17,  ordered serum osm and urine osm for am. Started sinemet for neurostimulation. Increased cardura to 0.8. Started FW 100q4, dc'd IVF. MRSA negative, dc'd vanc. NGT replaced d/t coiling.   10/18: BD 18, GEORGE overnight, neuro stable. Amantadine added for neurostim. zosyn changed to unasyn for acinetobacter baumannii, failed TOV and required SC  10/19: BD 19, GEORGE ovn. cardura 2mg added for retention. labetalol decreased 200q8, hydralazine decreased 25q8. Gabriel replaced.   10/20: BD20, GEORGE overnight. NGT dislodged, replaced. PEG tomorrow w/ gen surg, FW increased to 150q4 and labetalol decreased to 100q8, lokelma given for hyperkalemia.   10/21: BD 21. POD0 PEG placement with Gen surg. decr labetolol to 50q8, incr. cardura to 0.4, started lokelma and phoslo, dc gabriel POD0 PEG placement with Gen surg.  10/22: BD 22. Plan to start TF today via PEG. dc labetalol, Following ophtho recs. Increased apnea settings - found to be in cheyne-moe respiration. CPAP 5/5.  10/23: BD 23. hydralazine d/c'd, trach collar trial today. Rectal tube placed at 6am.  10/24: BD24, o/n lokelma held due to diarrhea. Free water 100q6 resumed. dc'd tamsulosin, amantadine. Incr'd cardura to 8mg qhs. Dc'd FW. Switched jevity to nepro. gabriel placed for high urine output. Started SL atropine for oral secretions. Dc'd free water.  10/25: BD25, o/n decreased suctioning requirements to > q4hrs, GEORGE. Cr improving, cont phoslo, lokelma held at this time. Gabriel placed yest, cont. Tolerating trach collar. Given 500cc plasmalyte bolus for ANIKA. Dc'd sinemet.   10/26: BD26, o/n resumed lokelma 5mg daily and resumed 100cc free water q6hrs. Change in neuro status with new right pupillary dilation with anisocoria (right pupil 6mm fixed and left pupil 3mm briskly reactive). Given 23.4% NaCl bullet, taken for emergent CTH showing mostly resolved pontine hemorrhage, continued brainstem hypodensity likely edema d/t hemorrhage, no new hemorrhage or infarct, no herniation, mild increase in size of left lateral ventricle. Vitals remaine stable. Na goal > 140.   10/27: BD27, o/n GEORGE.Neuro stable. Pend stepdown with airway bed.   10/28: BD 28. GEORGE overnight. Neuro stable.     Vital Signs Last 24 Hrs  T(C): 37.2 (27 Oct 2024 22:34), Max: 37.2 (27 Oct 2024 14:05)  T(F): 99 (27 Oct 2024 22:34), Max: 99 (27 Oct 2024 14:05)  HR: 76 (27 Oct 2024 21:28) (59 - 76)  BP: 138/92 (27 Oct 2024 20:42) (116/84 - 140/86)  BP(mean): 109 (27 Oct 2024 20:42) (96 - 109)  RR: 18 (27 Oct 2024 21:28) (12 - 18)  SpO2: 95% (27 Oct 2024 21:28) (95% - 100%)    Parameters below as of 27 Oct 2024 21:28  Patient On (Oxygen Delivery Method): tracheostomy collar    O2 Concentration (%): 35    I&O's Summary    26 Oct 2024 07:01  -  27 Oct 2024 07:00  --------------------------------------------------------  IN: 1440 mL / OUT: 1460 mL / NET: -20 mL    27 Oct 2024 07:01  -  28 Oct 2024 00:12  --------------------------------------------------------  IN: 510 mL / OUT: 560 mL / NET: -50 mL        PHYSICAL EXAM:  General: NAD, pt is comfortably resting in bed, Romansh speaking   HEENT: tracking vertically only, attempts to track to voice (right > left), right pupil 6mm sluggish, left pupil 3mm briskly reactive, +L corneal, +cough/gag, face symmetric, attempts to stick out tongue to command   Cardiovascular: RRR, normal S1 and S2   Respiratory: lungs CTAB, no wheezing, rhonchi, or crackles, +trach collar  GI: normoactive BS to auscultation, abd soft, NTND, +PEG and abdominal binder   Neuro: no verbal output, wiggled toes to command on right foot, shows 2 fingers with right hand, DARRELL/LL trace mvmt spont and to noxious   unable to assess sensation due to mental status       LABS:                        11.6   6.72  )-----------( 279      ( 27 Oct 2024 05:16 )             37.3     10-27    141  |  108  |  61[H]  ----------------------------<  129[H]  4.6   |  23  |  3.01[H]    Ca    9.4      27 Oct 2024 05:16  Phos  4.6     10-27  Mg     2.6     10-27        Urinalysis Basic - ( 27 Oct 2024 05:16 )    Color: x / Appearance: x / SG: x / pH: x  Gluc: 129 mg/dL / Ketone: x  / Bili: x / Urobili: x   Blood: x / Protein: x / Nitrite: x   Leuk Esterase: x / RBC: x / WBC x   Sq Epi: x / Non Sq Epi: x / Bacteria: x          CAPILLARY BLOOD GLUCOSE          Drug Levels: [] N/A    CSF Analysis: [] N/A      Allergies    Allergy Status Unknown    Intolerances      MEDICATIONS:  Antibiotics:    Neuro:  acetaminophen   Oral Liquid .. 650 milliGRAM(s) Oral every 6 hours PRN  oxyCODONE    Solution 5 milliGRAM(s) Oral every 4 hours PRN    Anticoagulation:  heparin   Injectable 5000 Unit(s) SubCutaneous every 8 hours    OTHER:  acetylcysteine 10%  Inhalation 4 milliLiter(s) Inhalation every 12 hours  albuterol/ipratropium for Nebulization 3 milliLiter(s) Nebulizer every 12 hours  amLODIPine   Tablet 10 milliGRAM(s) Oral daily  artificial tears (preservative free) Ophthalmic Solution 1 Drop(s) Right EYE every 4 hours  doxazosin 8 milliGRAM(s) Oral at bedtime  erythromycin   Ointment 1 Application(s) Right EYE every 6 hours  ofloxacin 0.3% Solution 1 Drop(s) Right EYE every 6 hours  petrolatum Ophthalmic Ointment 1 Application(s) Both EYES two times a day  sodium zirconium cyclosilicate 5 Gram(s) Oral daily    IVF:  calcium acetate 667 milliGRAM(s) Oral three times a day with meals    CULTURES:  Culture Results:   Mixed gram negative rods including  Moderate Acinetobacter baumannii/nosocomialis group (10-16 @ 00:13)  Culture Results:   No growth at 5 days (10-15 @ 14:55)    RADIOLOGY & ADDITIONAL TESTS:      ASSESSMENT:  45 y/o PMH ?stroke/MI present to [facility] after collapsing at work. Decorticate posturing, vomiting, intubated for airway protection. 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 Transferred to [facility] for further management. s/p trach 10/14. s/p peg 10/21.      PLAN:  Neuro:  - Neuro/vitals q4hr   - pain control: Tylenol prn, oxy prn  - CTH 9/30: enlarged pontine hemorrhage, CTH 10/3: read stable, CTH 10/25 anisocoric pupils (R sluggish 6mm compared to left 3mm briskly reactive); showing mostly resolved pontine hemorrhage, continued brainstem hypodensity likely edema d/t hemorrhage, no new hemorrhage or infarct, no herniation, mild increase in size of left lateral ventricle  - vEEG (10/3-4)- negative, (10/17-10/19) - negative  - stroke core measures, stroke neuro signed off  - MRI brain w/ w/o contrast 10/12: parenchymal hemorrhage, acute/subacute R cerebellar stroke      CV:  - -160  - HTN: amlodipine 10 mg qd  - echo (9/30) EF 75%    Resp:  - tolerating trach collar 35%  - duonebs/mucomyst q12h    GI:  - Nepro TF via PEG (placed 10/21 by gen surg)  - bowel regimen held for loose stool, last BM 10/26    Renal:  - IVL, +gabriel placed 10/24 for urinary retention  - FW flushes 100cc q6hrs   - hyperK 10/20: lokelma 5mg qd  - hyperPhos: phoslo 667mg TID   - Urinary retenion: Cardura 8 mg   - CKD: trend Cr  - Na goal > 140   - renal US 10/1: echogenicity c/w chronic med renal dz, repeat 10/8: inc renal echogeicity, c/f medical renal disease w increased hydro     Endo:  - A1c 5.4    Heme:  - DVT ppx: SCDs, SQH 5000u q8h     ID:  - afebrile  - 10/15 sputum +actinobacter baumanii, s/p unasyn (10/18-10/23)  - MRSA swab negative (10/2), sputum MSSA + , s/p 1 dose vanc (10/2), CTX (10/2 - 10/4), Ancef (10/4-10/14)     MISC:  - ophtho consult for keratitis, rec erythromycin ointment to rt eye q4hrs, ofloxacin ointment to rt eye QID, artificial tears to rt eye q2hrs, moisture chamber at bedtime    Dispo: SD status, full code, pending placement and emergency medicaid     D/w Dr. D'Amico

## 2024-10-28 NOTE — CONSULT NOTE ADULT - SUBJECTIVE AND OBJECTIVE BOX
HPI: 44 YOF with unclear PMH (?stroke, MI) who was found down at work, intubated for airway protection and found to have acute parenchymal hemorrhage within nabil with mass effect (+ acute/subacute right cerebellar infarct) in setting of hypertensive emergency, transferred to [facility] for further neurosurgical care. Hospital course c/b poor neurologic recovery s/p trach-PEG, AUR s/p gabriel, ANIKA on CKD c/b hyperkalemia, HAP s/p amp-sulbactam (EOT 10/23). Patient seen this morning resting in bed. Intermittently opens eyes to voice but inconsistently tracking, non-verbal, does not follow commands. History limited.     ROS: negative except as per HPI    PMH: as per HPI  PSH: as per HPI  Medications: reviewed  Allergies: reviewed  SH:  FH:    PAST MEDICAL & SURGICAL HISTORY:  Acute myocardial infarction      CVA (cerebral vascular accident)        Home Medications:    Allergies    Allergy Status Unknown    Intolerances      FAMILY HISTORY:    Social History:  Per coworkers, lives alone, no family. (30 Sep 2024 12:55)      Diet, NPO with Tube Feed:   Tube Feeding Modality: Gastrostomy  Nepro with Carb Steady (NEPRORTH)  Total Volume for 24 Hours (mL): 810  Total Number of Cans: 4  Continuous  Starting Tube Feed Rate mL per Hour: 20  Increase Tube Feed Rate by (mL): 10     Every 4 hours  Until Goal Tube Feed Rate (mL per Hour): 45  Tube Feed Duration (in Hours): 18  Tube Feed Start Time: 09:00  Tube Feed Stop Time: 05:00  Banatrol TF     Qty per Day:  2  Liquid Protein Supplement     Qty per Day:  2 (10-24-24 @ 09:42) [Active]      MEDICATIONS:  MEDICATIONS  (STANDING):  acetylcysteine 10%  Inhalation 4 milliLiter(s) Inhalation every 12 hours  albuterol/ipratropium for Nebulization 3 milliLiter(s) Nebulizer every 12 hours  amLODIPine   Tablet 10 milliGRAM(s) Oral daily  artificial tears (preservative free) Ophthalmic Solution 1 Drop(s) Right EYE every 4 hours  calcium acetate 667 milliGRAM(s) Oral three times a day with meals  doxazosin 8 milliGRAM(s) Oral at bedtime  erythromycin   Ointment 1 Application(s) Right EYE every 6 hours  heparin   Injectable 5000 Unit(s) SubCutaneous every 8 hours  ofloxacin 0.3% Solution 1 Drop(s) Right EYE every 6 hours  petrolatum Ophthalmic Ointment 1 Application(s) Both EYES two times a day  polyethylene glycol 3350 17 Gram(s) Oral daily  sodium zirconium cyclosilicate 5 Gram(s) Oral daily    MEDICATIONS  (PRN):  acetaminophen   Oral Liquid .. 650 milliGRAM(s) Oral every 6 hours PRN Temp greater or equal to 38C (100.4F), Mild Pain (1 - 3)  oxyCODONE    Solution 5 milliGRAM(s) Oral every 4 hours PRN Moderate Pain (4 - 6)      Allergies    Allergy Status Unknown    Intolerances        OBJECTIVE:  Vital Signs Last 24 Hrs  T(C): 36.9 (28 Oct 2024 13:05), Max: 37.6 (28 Oct 2024 05:01)  T(F): 98.4 (28 Oct 2024 13:05), Max: 99.7 (28 Oct 2024 05:01)  HR: 98 (28 Oct 2024 13:40) (74 - 98)  BP: 121/80 (28 Oct 2024 13:40) (118/78 - 140/86)  BP(mean): 96 (28 Oct 2024 13:40) (91 - 109)  RR: 20 (28 Oct 2024 13:40) (14 - 20)  SpO2: 94% (28 Oct 2024 13:40) (94% - 98%)    Parameters below as of 28 Oct 2024 13:40  Patient On (Oxygen Delivery Method): tracheostomy collar    O2 Concentration (%): 35  I&O's Summary    27 Oct 2024 07:01  -  28 Oct 2024 07:00  --------------------------------------------------------  IN: 510 mL / OUT: 560 mL / NET: -50 mL    28 Oct 2024 07:01  -  28 Oct 2024 16:07  --------------------------------------------------------  IN: 315 mL / OUT: 1050 mL / NET: -735 mL        PHYSICAL EXAM:  Gen: appears stated age, resting comfortably, NAD  HEENT: NCAT, MMM  Neck: trach with thick secretions   CV: RRR, no m/r/g, peripheral pulses 2+  Pulm: CTAB, no increased work of breathing, no rales/rhonchi  Abd: soft, ND, NT, PEG with abd binder  Skin: warm and dry  Ext: non-tender, no edema  Neuro: Opens eyes to voice, inconsistently tracking, does not follow commands, anisocoria   Psych: unable to assess     LABS:                        11.4   6.88  )-----------( 293      ( 28 Oct 2024 05:30 )             37.2     10-28    143  |  109[H]  |  65[H]  ----------------------------<  122[H]  4.8   |  21[L]  |  3.28[H]    Ca    9.4      28 Oct 2024 05:30  Phos  4.1     10-28  Mg     2.5     10-28          CAPILLARY BLOOD GLUCOSE        Urinalysis Basic - ( 28 Oct 2024 05:30 )    Color: x / Appearance: x / SG: x / pH: x  Gluc: 122 mg/dL / Ketone: x  / Bili: x / Urobili: x   Blood: x / Protein: x / Nitrite: x   Leuk Esterase: x / RBC: x / WBC x   Sq Epi: x / Non Sq Epi: x / Bacteria: x        MICRODATA:      RADIOLOGY/OTHER STUDIES:  Reviewed

## 2024-10-28 NOTE — CONSULT NOTE ADULT - PROBLEM SELECTOR RECOMMENDATION 9
Found to have acute parenchymal hemorrhage within nabil with mass effect (+ acute/subacute right cerebellar infarct) in setting of hypertensive emergency. MRI brain also demonstrated acute/subacute R cerebellar stroke.   - management per NSG, neuro checks  - pain control with bowel regimen (PRN)  - chemical DVT ppx with HSQ
Acute large pontine stroke. Per roommate, patient was taking lots of tylenol overnight, likely due to headache but was not on any prescription medications. Hemorrhage likely due to hypertensive emergency.  - Neurosurgery following. No intervention planned  - Appreciate excellent NSICU care

## 2024-10-29 LAB
ANION GAP SERPL CALC-SCNC: 14 MMOL/L — SIGNIFICANT CHANGE UP (ref 5–17)
BUN SERPL-MCNC: 72 MG/DL — HIGH (ref 7–23)
CALCIUM SERPL-MCNC: 9.6 MG/DL — SIGNIFICANT CHANGE UP (ref 8.4–10.5)
CHLORIDE SERPL-SCNC: 110 MMOL/L — HIGH (ref 96–108)
CO2 SERPL-SCNC: 21 MMOL/L — LOW (ref 22–31)
CREAT SERPL-MCNC: 3.28 MG/DL — HIGH (ref 0.5–1.3)
EGFR: 23 ML/MIN/1.73M2 — LOW
EGFR: 23 ML/MIN/1.73M2 — LOW
GLUCOSE SERPL-MCNC: 121 MG/DL — HIGH (ref 70–99)
HCT VFR BLD CALC: 38.2 % — LOW (ref 39–50)
HGB BLD-MCNC: 11.7 G/DL — LOW (ref 13–17)
MAGNESIUM SERPL-MCNC: 2.6 MG/DL — SIGNIFICANT CHANGE UP (ref 1.6–2.6)
MCHC RBC-ENTMCNC: 28.1 PG — SIGNIFICANT CHANGE UP (ref 27–34)
MCHC RBC-ENTMCNC: 30.6 GM/DL — LOW (ref 32–36)
MCV RBC AUTO: 91.6 FL — SIGNIFICANT CHANGE UP (ref 80–100)
NRBC # BLD: 0 /100 WBCS — SIGNIFICANT CHANGE UP (ref 0–0)
NRBC BLD-RTO: 0 /100 WBCS — SIGNIFICANT CHANGE UP (ref 0–0)
PHOSPHATE SERPL-MCNC: 3.7 MG/DL — SIGNIFICANT CHANGE UP (ref 2.5–4.5)
PLATELET # BLD AUTO: 266 K/UL — SIGNIFICANT CHANGE UP (ref 150–400)
POTASSIUM SERPL-MCNC: 4.5 MMOL/L — SIGNIFICANT CHANGE UP (ref 3.5–5.3)
POTASSIUM SERPL-SCNC: 4.5 MMOL/L — SIGNIFICANT CHANGE UP (ref 3.5–5.3)
RBC # BLD: 4.17 M/UL — LOW (ref 4.2–5.8)
RBC # FLD: 12.9 % — SIGNIFICANT CHANGE UP (ref 10.3–14.5)
SODIUM SERPL-SCNC: 145 MMOL/L — SIGNIFICANT CHANGE UP (ref 135–145)
WBC # BLD: 6.35 K/UL — SIGNIFICANT CHANGE UP (ref 3.8–10.5)
WBC # FLD AUTO: 6.35 K/UL — SIGNIFICANT CHANGE UP (ref 3.8–10.5)

## 2024-10-29 PROCEDURE — 99233 SBSQ HOSP IP/OBS HIGH 50: CPT

## 2024-10-29 PROCEDURE — 99232 SBSQ HOSP IP/OBS MODERATE 35: CPT

## 2024-10-29 RX ORDER — POLYETHYLENE GLYCOL 3350 17 G/17G
17 POWDER, FOR SOLUTION ORAL
Refills: 0 | Status: DISCONTINUED | OUTPATIENT
Start: 2024-10-29 | End: 2024-11-23

## 2024-10-29 RX ADMIN — ERYTHROMYCIN 1 APPLICATION(S): 5 OINTMENT OPHTHALMIC at 23:13

## 2024-10-29 RX ADMIN — Medication 1 DROP(S): at 20:05

## 2024-10-29 RX ADMIN — Medication 1 DROP(S): at 23:13

## 2024-10-29 RX ADMIN — POLYETHYLENE GLYCOL 3350 17 GRAM(S): 17 POWDER, FOR SOLUTION ORAL at 14:39

## 2024-10-29 RX ADMIN — DOXAZOSIN MESYLATE 8 MILLIGRAM(S): 8 TABLET ORAL at 21:16

## 2024-10-29 RX ADMIN — ACETYLCYSTEINE 4 MILLILITER(S): 200 INHALANT RESPIRATORY (INHALATION) at 20:04

## 2024-10-29 RX ADMIN — OFLOXACIN 1 DROP(S): 3 SOLUTION OPHTHALMIC at 11:26

## 2024-10-29 RX ADMIN — OFLOXACIN 1 DROP(S): 3 SOLUTION OPHTHALMIC at 23:13

## 2024-10-29 RX ADMIN — ERYTHROMYCIN 1 APPLICATION(S): 5 OINTMENT OPHTHALMIC at 20:07

## 2024-10-29 RX ADMIN — SODIUM ZIRCONIUM CYCLOSILICATE 5 GRAM(S): 5 POWDER, FOR SUSPENSION ORAL at 15:12

## 2024-10-29 RX ADMIN — Medication 1 APPLICATION(S): at 08:13

## 2024-10-29 RX ADMIN — ERYTHROMYCIN 1 APPLICATION(S): 5 OINTMENT OPHTHALMIC at 08:12

## 2024-10-29 RX ADMIN — Medication 1 DROP(S): at 15:12

## 2024-10-29 RX ADMIN — ACETYLCYSTEINE 4 MILLILITER(S): 200 INHALANT RESPIRATORY (INHALATION) at 08:13

## 2024-10-29 RX ADMIN — Medication 1 DROP(S): at 08:12

## 2024-10-29 RX ADMIN — Medication 1 DROP(S): at 11:25

## 2024-10-29 RX ADMIN — Medication 1000 MILLILITER(S): at 14:38

## 2024-10-29 RX ADMIN — HEPARIN SODIUM 5000 UNIT(S): 1000 INJECTION INTRAVENOUS; SUBCUTANEOUS at 08:12

## 2024-10-29 RX ADMIN — OFLOXACIN 1 DROP(S): 3 SOLUTION OPHTHALMIC at 08:12

## 2024-10-29 RX ADMIN — Medication 1 APPLICATION(S): at 20:04

## 2024-10-29 RX ADMIN — ERYTHROMYCIN 1 APPLICATION(S): 5 OINTMENT OPHTHALMIC at 11:26

## 2024-10-29 RX ADMIN — Medication 667 MILLIGRAM(S): at 11:24

## 2024-10-29 RX ADMIN — Medication 667 MILLIGRAM(S): at 14:30

## 2024-10-29 RX ADMIN — OFLOXACIN 1 DROP(S): 3 SOLUTION OPHTHALMIC at 20:07

## 2024-10-29 RX ADMIN — HEPARIN SODIUM 5000 UNIT(S): 1000 INJECTION INTRAVENOUS; SUBCUTANEOUS at 21:16

## 2024-10-29 RX ADMIN — Medication 667 MILLIGRAM(S): at 20:06

## 2024-10-29 RX ADMIN — Medication 1 DROP(S): at 02:13

## 2024-10-29 RX ADMIN — IPRATROPIUM BROMIDE AND ALBUTEROL SULFATE 3 MILLILITER(S): .5; 2.5 SOLUTION RESPIRATORY (INHALATION) at 20:04

## 2024-10-29 RX ADMIN — IPRATROPIUM BROMIDE AND ALBUTEROL SULFATE 3 MILLILITER(S): .5; 2.5 SOLUTION RESPIRATORY (INHALATION) at 08:13

## 2024-10-29 RX ADMIN — HEPARIN SODIUM 5000 UNIT(S): 1000 INJECTION INTRAVENOUS; SUBCUTANEOUS at 15:12

## 2024-10-29 RX ADMIN — AMLODIPINE BESYLATE 10 MILLIGRAM(S): 10 TABLET ORAL at 08:11

## 2024-10-29 NOTE — PROGRESS NOTE ADULT - PROBLEM SELECTOR PLAN 3
Baseline unclear but suspect underlying CKD from uncontrolled HTN, US renal with chronic medical renal disease. Hospital course c/b ATN.   - baseline creat appears 3-3.5 though freq outliers  - persistent hyperkalemia now on lokelma 5g qd  - persistent hyperphosphatemia now on phoslo 776 mg TID  - strict I/O, renally dose medications, avoid nephrotoxins  - recommend 1L NS for hydration given up-trending BUN

## 2024-10-29 NOTE — PROGRESS NOTE ADULT - ASSESSMENT
44 YOF with unclear PMH (?stroke, MI) who was found down at work, intubated for airway protection and found to have acute parenchymal hemorrhage within nabil with mass effect (+ acute/subacute right cerebellar infarct) in setting of hypertensive emergency, transferred to [facility] for further neurosurgical care. Hospital course c/b poor neurologic recovery s/p trach-PEG, AUR s/p gabriel, ANIKA on CKD c/b hyperkalemia, HAP s/p amp-sulbactam (EOT 10/23).

## 2024-10-29 NOTE — PROGRESS NOTE ADULT - SUBJECTIVE AND OBJECTIVE BOX
HPI:  Unknown age male, unknown past medical history (reported stroke and MI by coworkers) presented to [facility] with AMS, Pt was working at [employer] when he was found down by coworkers. EMS called and pt brought to [facility] ED. Intubated, sedated, started on cardene for SBPs in 200s. CT head showed brain stem bleed. Transferred to NSICU for further management.  (30 Sep 2024 12:55)    INTERVAL EVENTS:  GEORGE    HOSPITAL COURSE:  9/30: transferred from [facility]. A line placed. Versed dc'd. Cy Rader at bedside, states pt has family in [place], cannot confirm medications or PMH other than stroke and MI. 250cc bolus 3% given. LR switched to NS. hydralazine 25q8 started, 3% started, switched propofol to precedex   10/1: stability CTH done. Added labetalol, started TF. Palliative consulted. ethics consulted to determine surrogate. febrile 103, pan cx sent  10/2: BD 2, GEORGE overnight. TF resumed. Desatt'd to 80s, FiO2 inc. to 50. Fentanyl given, ABG, CXR ordered. Maxxed on precedex, started on propofol for DARIEN -4 - -5. Precedex dc'd. Duonebs, mucomyst, hypertonic added. 3% dc'd. Cardene dc'd. Start vanc/CTX. Increased labetalol 200q8. MRSA negative, dc'd vanc. ETT pulled back 2cm x 2, good positioning after confirmatory chest xray. Ethics attempting to establish HCP with family. Na 159, starting FW 250q6 for range 150-155.   10/3: BD3, GEORGE o/n, neuro stable. Na elevating, FW increased to 300q6. Dc'd bowel reg for diarrhea. vEEG started. SQH 5000q8 tonight.   10/4: BD 4, albumn bolus, incr. LR to 80 2/2 incr. in Cr, LR to 100cc/hr for uptrending Cr. Started 7% hypersal for 48hrs and SL atropine for inline/oral thick secretions. Dc'd CTX and started ancef for MSSA in the sputum. Nephrology consulted for CKD, f/u recs. SBP 170s, given hydralazine 10mg IVP.   10/5: BD5, o/n 10mg IVP hydralazine given for SBP 170s and started on hydralazine 25q8 via OGT. 10mg IV push labetalol for SBP > 160s. RT placed for diarrhea.   10/6: BD6, o/n FW increased to 350q4 per nephrology recs. IV tylenol for temp 100.6, SBp 160s presumed uncomfortable.   10/7: BD7, overnight pancultured for temp 101.8F.   10/8: BD8. GEORGE. Cr bumped. decreased LR to 75cc/hr. Adding simethicone ATC. incr hydralazine 50mgTID. Incr labetalol 300mgTID. Na 145, decreased FWF to 250q6. Start precedex. FENa consistent with intrinsic kidney injury. Pend repeat renal US. Retaining up to 1.3L, bladder scans q6, straight cath PRN  10/9: BD 9. GEORGE overnight. Neuro stable. abd xray for distention w non-specific gas pattern, OGT to LIWS for morning. duonebs/mucomyst to q8 for improving secretions. Changed tube feeds to Jevity 1.5 20cc/hr, low rate due to abdominal distention, nepro dense and more difficult to digest. Tolerating CPAP, confirmed by ABG.   10/10: BD 10. GEORGE overnight. Neuro stable. (+) gabriel for urinary retention on bladder scan. inc TF to goal rate of 40cc/hr. family leaning toward pursuing trach/PEG. 1/2 amp for FS 81.   10/11: BD 11. GEORGE overnight. Neuro stable. Trach/PEG consults placed.   10/12: BD 12. GEORGE overnight. Neuro stable. MRI brain complete.   10/13: BD 13. Increase flomax. Hold SQH after PM dose for trach tm. IVL.   10/14: BD 14. GEORGE overnight, remains on AC/VC. Gabriel placed for urinary retention. Dc'd free water.  S/p trach with pulm. NGT placed and CXR confirmed in good position.   10/15: BD 15, GEORGE ovn. resumed feeds. spiked 101, pan cx sent.   10/16: BD 16. GEORGE ovn. Lokelma 5mg for K+ 5.4. Started vanc q 24/zosyn for empiric PNA coverage, IVF to 100/hr. PEG held for fever.   10/17: BD 17,  ordered serum osm and urine osm for am. Started sinemet for neurostimulation. Increased cardura to 0.8. Started FW 100q4, dc'd IVF. MRSA negative, dc'd vanc. NGT replaced d/t coiling.   10/18: BD 18, GEORGE overnight, neuro stable. Amantadine added for neurostim. zosyn changed to unasyn for acinetobacter baumannii, failed TOV and required SC  10/19: BD 19, GEORGE ovn. cardura 2mg added for retention. labetalol decreased 200q8, hydralazine decreased 25q8. Gabriel replaced.   10/20: BD20, GEORGE overnight. NGT dislodged, replaced. PEG tomorrow w/ gen surg, FW increased to 150q4 and labetalol decreased to 100q8, lokelma given for hyperkalemia.   10/21: BD 21. POD0 PEG placement with Gen surg. decr labetolol to 50q8, incr. cardura to 0.4, started lokelma and phoslo, dc gabriel POD0 PEG placement with Gen surg.  10/22: BD 22. Plan to start TF today via PEG. dc labetalol, Following ophtho recs. Increased apnea settings - found to be in cheyne-moe respiration. CPAP 5/5.  10/23: BD 23. hydralazine d/c'd, trach collar trial today. Rectal tube placed at 6am.  10/24: BD24, o/n lokelma held due to diarrhea. Free water 100q6 resumed. dc'd tamsulosin, amantadine. Incr'd cardura to 8mg qhs. Dc'd FW. Switched jevity to nepro. gabriel placed for high urine output. Started SL atropine for oral secretions. Dc'd free water.  10/25: BD25, o/n decreased suctioning requirements to > q4hrs, GEORGE. Cr improving, cont phoslo, lokelma held at this time. Gabriel placed yest, cont. Tolerating trach collar. Given 500cc plasmalyte bolus for ANIKA. Dc'd sinemet.   10/26: BD26, o/n resumed lokelma 5mg daily and resumed 100cc free water q6hrs. Change in neuro status with new right pupillary dilation with anisocoria (right pupil 6mm fixed and left pupil 3mm briskly reactive). Given 23.4% NaCl bullet, taken for emergent CTH showing mostly resolved pontine hemorrhage, continued brainstem hypodensity likely edema d/t hemorrhage, no new hemorrhage or infarct, no herniation, mild increase in size of left lateral ventricle. Vitals remaine stable. Na goal > 140.   10/27: BD27, o/n GEORGE.Neuro stable. Pend stepdown with airway bed.   10/28: BD 28. GEORGE overnight. Neuro stable. Miralax ordered. Gabriel removed, pending TOV.  10/29: BD 29. GEORGE o/n.       Vital Signs Last 24 Hrs  T(C): 37 (28 Oct 2024 21:35), Max: 37.6 (28 Oct 2024 05:01)  T(F): 98.6 (28 Oct 2024 21:35), Max: 99.7 (28 Oct 2024 05:01)  HR: 88 (29 Oct 2024 00:04) (80 - 98)  BP: 122/86 (29 Oct 2024 00:04) (118/78 - 138/84)  BP(mean): 101 (29 Oct 2024 00:04) (91 - 105)  RR: 17 (29 Oct 2024 00:04) (17 - 20)  SpO2: 97% (29 Oct 2024 00:04) (94% - 97%)    Parameters below as of 29 Oct 2024 00:04  Patient On (Oxygen Delivery Method): tracheostomy collar    O2 Concentration (%): 35    I&O's Summary    27 Oct 2024 07:01  -  28 Oct 2024 07:00  --------------------------------------------------------  IN: 510 mL / OUT: 560 mL / NET: -50 mL    28 Oct 2024 07:01  -  29 Oct 2024 00:45  --------------------------------------------------------  IN: 315 mL / OUT: 1050 mL / NET: -735 mL        PHYSICAL EXAM:  General: NAD, pt is comfortably resting in bed, Ch[place] speaking   HEENT: tracking vertically only, attempts to track to voice (right > left), right pupil 6mm sluggish, left pupil 3mm briskly reactive, +L corneal, +cough/gag, face symmetric, attempts to stick out tongue to command   Cardiovascular: RRR, normal S1 and S2   Respiratory: lungs CTAB, no wheezing, rhonchi, or crackles, +trach collar  GI: normoactive BS to auscultation, abd soft, NTND, +PEG and abdominal binder   Neuro: no verbal output, wiggled toes to command on right foot, shows 2 fingers with right hand, DARRELL/LL trace mvmt spont and to noxious   unable to assess sensation due to mental status         LABS:                        11.4   6.88  )-----------( 293      ( 28 Oct 2024 05:30 )             37.2     10-28    143  |  109[H]  |  65[H]  ----------------------------<  122[H]  4.8   |  21[L]  |  3.28[H]    Ca    9.4      28 Oct 2024 05:30  Phos  4.1     10-28  Mg     2.5     10-28        Urinalysis Basic - ( 28 Oct 2024 05:30 )    Color: x / Appearance: x / SG: x / pH: x  Gluc: 122 mg/dL / Ketone: x  / Bili: x / Urobili: x   Blood: x / Protein: x / Nitrite: x   Leuk Esterase: x / RBC: x / WBC x   Sq Epi: x / Non Sq Epi: x / Bacteria: x          CAPILLARY BLOOD GLUCOSE          Drug Levels: [] N/A    CSF Analysis: [] N/A      Allergies    Allergy Status Unknown    Intolerances      MEDICATIONS:  Antibiotics:    Neuro:  acetaminophen   Oral Liquid .. 650 milliGRAM(s) Oral every 6 hours PRN  oxyCODONE    Solution 5 milliGRAM(s) Oral every 4 hours PRN    Anticoagulation:  heparin   Injectable 5000 Unit(s) SubCutaneous every 8 hours    OTHER:  acetylcysteine 10%  Inhalation 4 milliLiter(s) Inhalation every 12 hours  albuterol/ipratropium for Nebulization 3 milliLiter(s) Nebulizer every 12 hours  amLODIPine   Tablet 10 milliGRAM(s) Oral daily  artificial tears (preservative free) Ophthalmic Solution 1 Drop(s) Right EYE every 4 hours  doxazosin 8 milliGRAM(s) Oral at bedtime  erythromycin   Ointment 1 Application(s) Right EYE every 6 hours  ofloxacin 0.3% Solution 1 Drop(s) Right EYE every 6 hours  petrolatum Ophthalmic Ointment 1 Application(s) Both EYES two times a day  polyethylene glycol 3350 17 Gram(s) Oral daily  sodium zirconium cyclosilicate 5 Gram(s) Oral daily    IVF:  calcium acetate 667 milliGRAM(s) Oral three times a day with meals    CULTURES:  Culture Results:   Mixed gram negative rods including  Moderate Acinetobacter baumannii/nosocomialis group (10-16 @ 00:13)  Culture Results:   No growth at 5 days (10-15 @ 14:55)    RADIOLOGY & ADDITIONAL TESTS:      ASSESSMENT:  43 y/o PMH ?stroke/MI present to [facility] after collapsing at work. Decorticate posturing, vomiting, intubated for airway protection. 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 Transferred [facility] for [facility] management. s/p trach 10/14. s/p peg 10/21.      PLAN:  Neuro:  - Neuro/vitals q4hr   - pain control: Tylenol prn, oxy prn  - CTH 9/30: enlarged pontine hemorrhage, CTH 10/3: read stable, CTH 10/25 anisocoric pupils (R sluggish 6mm compared to left 3mm briskly reactive); showing mostly resolved pontine hemorrhage, continued brainstem hypodensity likely edema d/t hemorrhage, no new hemorrhage or infarct, no herniation, mild increase in size of left lateral ventricle  - vEEG (10/3-4)- negative, (10/17-10/19) - negative  - stroke core measures, stroke neuro signed off  - MRI brain w/ w/o contrast 10/12: parenchymal hemorrhage, acute/subacute R cerebellar stroke      CV:  - -160  - HTN: amlodipine 10 mg qd  - echo (9/30) EF 75%    Resp:  - tolerating trach collar 35%  - duonebs/mucomyst q12h    GI:  - Nepro TF via PEG (placed 10/21 by gen surg)  - bowel regimen, last BM 10/26    Renal:  - pending TOV  - FW flushes 100cc q6hrs   - hyperK 10/20: lokelma 5mg qd  - hyperPhos: phoslo 667mg TID   - Urinary retenion: Cardura 8 mg   - CKD: trend Cr  - Na goal > 140   - renal US 10/1: echogenicity c/w chronic med renal dz, repeat 10/8: inc renal echogeicity, c/f medical renal disease w increased hydro     Endo:  - A1c 5.4    Heme:  - DVT ppx: SCDs, SQH 5000u q8h     ID:  - afebrile  - 10/15 sputum +actinobacter baumanii, s/p unasyn (10/18-10/23)  - MRSA swab negative (10/2), sputum MSSA + , s/p 1 dose vanc (10/2), CTX (10/2 - 10/4), Ancef (10/4-10/14)     MISC:  - ophtho consult for keratitis, rec erythromycin ointment to rt eye q4hrs, ofloxacin ointment to rt eye QID, artificial tears to rt eye q2hrs, moisture chamber at bedtime    Dispo: SD status, full code, pending placement and emergency medicaid     D/w Dr. D'Amico

## 2024-10-29 NOTE — PROGRESS NOTE ADULT - SUBJECTIVE AND OBJECTIVE BOX
SUBJECTIVE: NAEON. Patient seen this morning resting in bed. Does not track, non-verbal, unable to follow commands.       DIET:  Diet, NPO with Tube Feed:   Tube Feeding Modality: Gastrostomy  Nepro with Carb Steady (NEPRORTH)  Total Volume for 24 Hours (mL): 1080  Total Number of Cans: 5  Continuous  Starting Tube Feed Rate mL per Hour: 45  Increase Tube Feed Rate by (mL): 10     Every 4 hours  Until Goal Tube Feed Rate (mL per Hour): 60  Tube Feed Duration (in Hours): 18  Tube Feed Start Time: 10:00  Tube Feed Stop Time: 04:00  Banatrol TF     Qty per Day:  2 (10-28-24 @ 16:11) [Active]      MEDICATIONS:  MEDICATIONS  (STANDING):  acetylcysteine 10%  Inhalation 4 milliLiter(s) Inhalation every 12 hours  albuterol/ipratropium for Nebulization 3 milliLiter(s) Nebulizer every 12 hours  amLODIPine   Tablet 10 milliGRAM(s) Oral daily  artificial tears (preservative free) Ophthalmic Solution 1 Drop(s) Right EYE every 4 hours  calcium acetate 667 milliGRAM(s) Oral three times a day with meals  doxazosin 8 milliGRAM(s) Oral at bedtime  erythromycin   Ointment 1 Application(s) Right EYE every 6 hours  heparin   Injectable 5000 Unit(s) SubCutaneous every 8 hours  ofloxacin 0.3% Solution 1 Drop(s) Right EYE every 6 hours  petrolatum Ophthalmic Ointment 1 Application(s) Both EYES two times a day  polyethylene glycol 3350 17 Gram(s) Oral two times a day  sodium zirconium cyclosilicate 5 Gram(s) Oral daily    MEDICATIONS  (PRN):  acetaminophen   Oral Liquid .. 650 milliGRAM(s) Oral every 6 hours PRN Temp greater or equal to 38C (100.4F), Mild Pain (1 - 3)  oxyCODONE    Solution 5 milliGRAM(s) Oral every 4 hours PRN Moderate Pain (4 - 6)      Allergies    Allergy Status Unknown    Intolerances        OBJECTIVE:  Vital Signs Last 24 Hrs  T(C): 37.3 (29 Oct 2024 13:35), Max: 37.6 (29 Oct 2024 09:19)  T(F): 99.1 (29 Oct 2024 13:35), Max: 99.7 (29 Oct 2024 09:19)  HR: 84 (29 Oct 2024 15:59) (77 - 96)  BP: 126/86 (29 Oct 2024 15:59) (120/83 - 129/75)  BP(mean): 98 (29 Oct 2024 15:59) (94 - 101)  RR: 18 (29 Oct 2024 15:59) (17 - 18)  SpO2: 97% (29 Oct 2024 15:59) (94% - 98%)    Parameters below as of 29 Oct 2024 15:59  Patient On (Oxygen Delivery Method): tracheostomy collar    O2 Concentration (%): 35  I&O's Summary    28 Oct 2024 07:01  -  29 Oct 2024 07:00  --------------------------------------------------------  IN: 315 mL / OUT: 2000 mL / NET: -1685 mL        PHYSICAL EXAM:  Gen: appears stated age, resting comfortably, NAD  HEENT: NCAT, MMM  Neck: trach with thick secretions   CV: RRR, no m/r/g, peripheral pulses 2+  Pulm: CTAB, no increased work of breathing, no rales/rhonchi  Abd: soft, ND, NT, PEG with abd binder  Skin: warm and dry  Ext: non-tender, no edema  Neuro: Opens eyes to voice, inconsistently tracking, does not follow commands, anisocoria   Psych: unable to assess     LABS:                        11.7   6.35  )-----------( 266      ( 29 Oct 2024 05:30 )             38.2     10-29    145  |  110[H]  |  72[H]  ----------------------------<  121[H]  4.5   |  21[L]  |  3.28[H]    Ca    9.6      29 Oct 2024 05:30  Phos  3.7     10-29  Mg     2.6     10-29          CAPILLARY BLOOD GLUCOSE        Urinalysis Basic - ( 29 Oct 2024 05:30 )    Color: x / Appearance: x / SG: x / pH: x  Gluc: 121 mg/dL / Ketone: x  / Bili: x / Urobili: x   Blood: x / Protein: x / Nitrite: x   Leuk Esterase: x / RBC: x / WBC x   Sq Epi: x / Non Sq Epi: x / Bacteria: x        MICRODATA:      RADIOLOGY/OTHER STUDIES:  Reviewed

## 2024-10-29 NOTE — PROGRESS NOTE ADULT - TIME BILLING
chart review, patient exam, review of labs/imaging, management of medical conditions, discussion with consultants, documentation; excludes teaching and separately reported services.

## 2024-10-29 NOTE — PROGRESS NOTE ADULT - PROBLEM SELECTOR PLAN 1
Found to have acute parenchymal hemorrhage within nabil with mass effect (+ acute/subacute right cerebellar infarct) in setting of hypertensive emergency. MRI brain also demonstrated acute/subacute R cerebellar stroke.   - management per NSG, neuro checks  - pain control with bowel regimen (PRN)  - chemical DVT ppx with HSQ

## 2024-10-30 LAB
ANION GAP SERPL CALC-SCNC: 12 MMOL/L — SIGNIFICANT CHANGE UP (ref 5–17)
BUN SERPL-MCNC: 77 MG/DL — HIGH (ref 7–23)
CALCIUM SERPL-MCNC: 9.6 MG/DL — SIGNIFICANT CHANGE UP (ref 8.4–10.5)
CHLORIDE SERPL-SCNC: 111 MMOL/L — HIGH (ref 96–108)
CO2 SERPL-SCNC: 22 MMOL/L — SIGNIFICANT CHANGE UP (ref 22–31)
CREAT SERPL-MCNC: 3.22 MG/DL — HIGH (ref 0.5–1.3)
EGFR: 23 ML/MIN/1.73M2 — LOW
EGFR: 23 ML/MIN/1.73M2 — LOW
GLUCOSE SERPL-MCNC: 117 MG/DL — HIGH (ref 70–99)
HCT VFR BLD CALC: 39.3 % — SIGNIFICANT CHANGE UP (ref 39–50)
HGB BLD-MCNC: 12.2 G/DL — LOW (ref 13–17)
MAGNESIUM SERPL-MCNC: 2.6 MG/DL — SIGNIFICANT CHANGE UP (ref 1.6–2.6)
MCHC RBC-ENTMCNC: 29.3 PG — SIGNIFICANT CHANGE UP (ref 27–34)
MCHC RBC-ENTMCNC: 31 G/DL — LOW (ref 32–36)
MCV RBC AUTO: 94.5 FL — SIGNIFICANT CHANGE UP (ref 80–100)
NRBC # BLD: 0 /100 WBCS — SIGNIFICANT CHANGE UP (ref 0–0)
NRBC BLD-RTO: 0 /100 WBCS — SIGNIFICANT CHANGE UP (ref 0–0)
PHOSPHATE SERPL-MCNC: 4.4 MG/DL — SIGNIFICANT CHANGE UP (ref 2.5–4.5)
PLATELET # BLD AUTO: 230 K/UL — SIGNIFICANT CHANGE UP (ref 150–400)
POTASSIUM SERPL-MCNC: 4.9 MMOL/L — SIGNIFICANT CHANGE UP (ref 3.5–5.3)
POTASSIUM SERPL-SCNC: 4.9 MMOL/L — SIGNIFICANT CHANGE UP (ref 3.5–5.3)
RBC # BLD: 4.16 M/UL — LOW (ref 4.2–5.8)
RBC # FLD: 13.3 % — SIGNIFICANT CHANGE UP (ref 10.3–14.5)
SODIUM SERPL-SCNC: 145 MMOL/L — SIGNIFICANT CHANGE UP (ref 135–145)
WBC # BLD: 7.91 K/UL — SIGNIFICANT CHANGE UP (ref 3.8–10.5)
WBC # FLD AUTO: 7.91 K/UL — SIGNIFICANT CHANGE UP (ref 3.8–10.5)

## 2024-10-30 PROCEDURE — 99233 SBSQ HOSP IP/OBS HIGH 50: CPT

## 2024-10-30 RX ADMIN — ERYTHROMYCIN 1 APPLICATION(S): 5 OINTMENT OPHTHALMIC at 18:21

## 2024-10-30 RX ADMIN — Medication 1 DROP(S): at 11:44

## 2024-10-30 RX ADMIN — AMLODIPINE BESYLATE 10 MILLIGRAM(S): 10 TABLET ORAL at 05:00

## 2024-10-30 RX ADMIN — Medication 1 DROP(S): at 03:00

## 2024-10-30 RX ADMIN — OFLOXACIN 1 DROP(S): 3 SOLUTION OPHTHALMIC at 18:22

## 2024-10-30 RX ADMIN — ERYTHROMYCIN 1 APPLICATION(S): 5 OINTMENT OPHTHALMIC at 11:46

## 2024-10-30 RX ADMIN — Medication 1 APPLICATION(S): at 18:23

## 2024-10-30 RX ADMIN — HEPARIN SODIUM 5000 UNIT(S): 1000 INJECTION INTRAVENOUS; SUBCUTANEOUS at 14:58

## 2024-10-30 RX ADMIN — Medication 1 DROP(S): at 06:50

## 2024-10-30 RX ADMIN — HEPARIN SODIUM 5000 UNIT(S): 1000 INJECTION INTRAVENOUS; SUBCUTANEOUS at 21:11

## 2024-10-30 RX ADMIN — Medication 1 DROP(S): at 14:59

## 2024-10-30 RX ADMIN — OFLOXACIN 1 DROP(S): 3 SOLUTION OPHTHALMIC at 05:01

## 2024-10-30 RX ADMIN — Medication 667 MILLIGRAM(S): at 18:20

## 2024-10-30 RX ADMIN — ACETYLCYSTEINE 4 MILLILITER(S): 200 INHALANT RESPIRATORY (INHALATION) at 05:00

## 2024-10-30 RX ADMIN — OFLOXACIN 1 DROP(S): 3 SOLUTION OPHTHALMIC at 11:46

## 2024-10-30 RX ADMIN — HEPARIN SODIUM 5000 UNIT(S): 1000 INJECTION INTRAVENOUS; SUBCUTANEOUS at 05:00

## 2024-10-30 RX ADMIN — POLYETHYLENE GLYCOL 3350 17 GRAM(S): 17 POWDER, FOR SOLUTION ORAL at 18:20

## 2024-10-30 RX ADMIN — ERYTHROMYCIN 1 APPLICATION(S): 5 OINTMENT OPHTHALMIC at 05:00

## 2024-10-30 RX ADMIN — DOXAZOSIN MESYLATE 8 MILLIGRAM(S): 8 TABLET ORAL at 21:11

## 2024-10-30 RX ADMIN — POLYETHYLENE GLYCOL 3350 17 GRAM(S): 17 POWDER, FOR SOLUTION ORAL at 05:02

## 2024-10-30 RX ADMIN — Medication 1 DROP(S): at 18:22

## 2024-10-30 RX ADMIN — Medication 250 MILLILITER(S): at 16:54

## 2024-10-30 RX ADMIN — Medication 667 MILLIGRAM(S): at 14:58

## 2024-10-30 RX ADMIN — Medication 667 MILLIGRAM(S): at 09:35

## 2024-10-30 RX ADMIN — IPRATROPIUM BROMIDE AND ALBUTEROL SULFATE 3 MILLILITER(S): .5; 2.5 SOLUTION RESPIRATORY (INHALATION) at 05:00

## 2024-10-30 RX ADMIN — SODIUM ZIRCONIUM CYCLOSILICATE 5 GRAM(S): 5 POWDER, FOR SUSPENSION ORAL at 14:59

## 2024-10-30 RX ADMIN — Medication 1 DROP(S): at 22:19

## 2024-10-30 RX ADMIN — Medication 1 APPLICATION(S): at 05:01

## 2024-10-30 NOTE — PROGRESS NOTE ADULT - SUBJECTIVE AND OBJECTIVE BOX
SUBJECTIVE: NAEON. Patient seen this morning. Does not track, non-verbal, unable to follow commands.       DIET:  Diet, NPO with Tube Feed:   Tube Feeding Modality: Gastrostomy  Nepro with Carb Steady (NEPRORTH)  Total Volume for 24 Hours (mL): 1080  Total Number of Cans: 5  Continuous  Starting Tube Feed Rate mL per Hour: 45  Increase Tube Feed Rate by (mL): 10     Every 4 hours  Until Goal Tube Feed Rate (mL per Hour): 60  Tube Feed Duration (in Hours): 18  Tube Feed Start Time: 10:00  Tube Feed Stop Time: 04:00  Banatrol TF     Qty per Day:  2 (10-28-24 @ 16:11) [Active]      MEDICATIONS:  MEDICATIONS  (STANDING):  acetylcysteine 10%  Inhalation 4 milliLiter(s) Inhalation every 12 hours  albuterol/ipratropium for Nebulization 3 milliLiter(s) Nebulizer every 12 hours  amLODIPine   Tablet 10 milliGRAM(s) Oral daily  artificial tears (preservative free) Ophthalmic Solution 1 Drop(s) Right EYE every 4 hours  calcium acetate 667 milliGRAM(s) Oral three times a day with meals  doxazosin 8 milliGRAM(s) Oral at bedtime  erythromycin   Ointment 1 Application(s) Right EYE every 6 hours  heparin   Injectable 5000 Unit(s) SubCutaneous every 8 hours  ofloxacin 0.3% Solution 1 Drop(s) Right EYE every 6 hours  petrolatum Ophthalmic Ointment 1 Application(s) Both EYES two times a day  polyethylene glycol 3350 17 Gram(s) Oral two times a day  sodium zirconium cyclosilicate 5 Gram(s) Oral daily    MEDICATIONS  (PRN):  acetaminophen   Oral Liquid .. 650 milliGRAM(s) Oral every 6 hours PRN Temp greater or equal to 38C (100.4F), Mild Pain (1 - 3)  oxyCODONE    Solution 5 milliGRAM(s) Oral every 4 hours PRN Moderate Pain (4 - 6)      Allergies    Allergy Status Unknown    Intolerances        OBJECTIVE:  Vital Signs Last 24 Hrs  T(C): 36.8 (30 Oct 2024 09:13), Max: 37.7 (30 Oct 2024 04:33)  T(F): 98.3 (30 Oct 2024 09:13), Max: 99.9 (30 Oct 2024 04:33)  HR: 84 (30 Oct 2024 11:49) (84 - 98)  BP: 137/91 (30 Oct 2024 11:49) (126/86 - 137/91)  BP(mean): 108 (30 Oct 2024 11:49) (98 - 108)  RR: 11 (30 Oct 2024 11:49) (11 - 18)  SpO2: 96% (30 Oct 2024 11:49) (92% - 98%)    Parameters below as of 30 Oct 2024 11:49  Patient On (Oxygen Delivery Method): tracheostomy collar  O2 Flow (L/min): 10  O2 Concentration (%): 35  I&O's Summary    29 Oct 2024 07:01  -  30 Oct 2024 07:00  --------------------------------------------------------  IN: 860 mL / OUT: 1400 mL / NET: -540 mL    30 Oct 2024 07:01  -  30 Oct 2024 13:59  --------------------------------------------------------  IN: 0 mL / OUT: 300 mL / NET: -300 mL        PHYSICAL EXAM:  Gen: appears stated age, resting comfortably, NAD  HEENT: NCAT, MMM  Neck: trach with thick secretions   CV: RRR, no m/r/g, peripheral pulses 2+  Pulm: CTAB, no increased work of breathing, no rales/rhonchi  Abd: soft, ND, NT, PEG with abd binder  Skin: warm and dry  Ext: non-tender, no edema  Neuro: Eyes spontaneously open, does not follow commands, anisocoria   Psych: unable to assess     LABS:                        12.2   7.91  )-----------( 230      ( 30 Oct 2024 05:30 )             39.3     10-30    145  |  111[H]  |  77[H]  ----------------------------<  117[H]  4.9   |  22  |  3.22[H]    Ca    9.6      30 Oct 2024 05:30  Phos  4.4     10-30  Mg     2.6     10-30          CAPILLARY BLOOD GLUCOSE        Urinalysis Basic - ( 30 Oct 2024 05:30 )    Color: x / Appearance: x / SG: x / pH: x  Gluc: 117 mg/dL / Ketone: x  / Bili: x / Urobili: x   Blood: x / Protein: x / Nitrite: x   Leuk Esterase: x / RBC: x / WBC x   Sq Epi: x / Non Sq Epi: x / Bacteria: x        MICRODATA:      RADIOLOGY/OTHER STUDIES:  Reviewed

## 2024-10-30 NOTE — CHART NOTE - NSCHARTNOTEFT_GEN_A_CORE
Admitting Diagnosis:   Patient is a 44y old  Male who presents with a chief complaint of brain stem bleed (30 Oct 2024 13:58)  PAST MEDICAL & SURGICAL HISTORY:  Acute myocardial infarction  CVA (cerebral vascular accident)    Current Nutrition Order: Nepro 1.8 via PEG, start at 20mL/hour, increase by 10mL/hour every 4-6 hours to GOAL of 60mL/hour x 18 hours, to provide 1080mL total volume from tube feeding, 1912 calories, 87g protein, 785mL free water; this meets ~27 calories/kg ideal body weight, ~22.4 nonprotein calories/kg ideal body weight, 1.25g protein/kg ideal body weight (70kg); consider 190mL every 4 hours  **continue Banatrol PRN to promote fecal bulk**    PO Intake: Good (%) [   ]  Fair (50-75%) [   ] Poor (<25%) [   ] *NPO with tube feeding [ x ]*    GI Issues: soft/nontender abdomen, audible and normoactive bowel sounds    Pain: nonverbal indicators absent    Skin Integrity: intact, no surgical incisions remain; PEG in place; no noted edema; no pressure ulcers documented; Mookie: 11      10-29-24 @ 07:01  -  10-30-24 @ 07:00  --------------------------------------------------------  IN: 860 mL / OUT: 1400 mL / NET: -540 mL    10-30-24 @ 07:01  -  10-30-24 @ 15:33  --------------------------------------------------------  IN: 0 mL / OUT: 300 mL / NET: -300 mL      Labs:   10-30    145  |  111[H]  |  77[H]  ----------------------------<  117[H]  4.9   |  22  |  3.22[H]    Ca    9.6      30 Oct 2024 05:30  Phos  4.4     10-30  Mg     2.6     10-30    CAPILLARY BLOOD GLUCOSE    Medications:  MEDICATIONS  (STANDING):  acetylcysteine 10%  Inhalation 4 milliLiter(s) Inhalation every 12 hours  albuterol/ipratropium for Nebulization 3 milliLiter(s) Nebulizer every 12 hours  amLODIPine   Tablet 10 milliGRAM(s) Oral daily  artificial tears (preservative free) Ophthalmic Solution 1 Drop(s) Right EYE every 4 hours  calcium acetate 667 milliGRAM(s) Oral three times a day with meals  doxazosin 8 milliGRAM(s) Oral at bedtime  erythromycin   Ointment 1 Application(s) Right EYE every 6 hours  heparin   Injectable 5000 Unit(s) SubCutaneous every 8 hours  ofloxacin 0.3% Solution 1 Drop(s) Right EYE every 6 hours  petrolatum Ophthalmic Ointment 1 Application(s) Both EYES two times a day  polyethylene glycol 3350 17 Gram(s) Oral two times a day  sodium chloride 0.9% Bolus 1000 milliLiter(s) IV Bolus once  sodium zirconium cyclosilicate 5 Gram(s) Oral daily    MEDICATIONS  (PRN):  acetaminophen   Oral Liquid .. 650 milliGRAM(s) Oral every 6 hours PRN Temp greater or equal to 38C (100.4F), Mild Pain (1 - 3)  oxyCODONE    Solution 5 milliGRAM(s) Oral every 4 hours PRN Moderate Pain (4 - 6)    Dosing Anthropometrics:   Height for BMI (FEET)	5 Feet  Height for BMI (INCHES)	8 Inch(s)  Height for BMI (CM)	172.72 Centimeter(s)  Weight for BMI (lbs)	176.4 lb  Weight for BMI (kg)	80 kg  Body Mass Index	              26.8  ideal body weight:               154 pounds, pt is ~115% of ideal body weight    Weight Change: no new weights noted in electronic medical records; recommend that nursing obtain updated weights weekly prn. RD to monitor trends.     Estimated energy needs:  Weight used for calculations	ideal body weight  Estimated Energy Needs Weight (lbs)	154 lb  Estimated Energy Needs Weight (kg)	69.8 kg  Estimated Energy Needs From (christiana/kg)	25  Estimated Energy Needs To (christiana/kg)	30  Estimated Energy Needs Calculated From (christiana/kg)	1745  Estimated Energy Needs Calculated To (christiana/kg)	2094  Weight used for calculations	ideal body weight  Estimated Protein Needs Weight (lbs)	154 lb  Estimated Protein Needs Weight (kg)	69.8 kg  Estimated Protein Needs From (g/kg)	1.2  Estimated Protein Needs To (g/kg)	1.4  Estimated Protein Needs Calculated From (g/kg)	83.76  Estimated Protein Needs Calculated To (g/kg)	97.72  Estimated Fluid Needs Weight (lbs)	154 lb  Estimated Fluid Needs Weight (kg)	69.8 kg  Estimated Fluid Needs From (ml/kg)	25  Estimated Fluid Needs To (ml/kg)	30  Estimated Fluid Needs Calculated From (ml/kg)	1745  Estimated Fluid Needs Calculated To (ml/kg)	2094  Other Calculations	Pt is >100% ideal body weight in the ICU, thus ideal body weight used for all calculations. Needs adjusted for age, clinical course, vented, ICU/critical care status.    Subjective: This is a 44 year old male, unknown past medical history (reported stroke and MI by coworkers) presented to [facility] with AMS, Pt was working at [employer] when he was found down by coworkers. EMS called and pt brought to [facility] ED. Intubated, sedated, started on cardene for SBPs in 200s. CT head showed brain stem bleed. Transferred to NSICU for further management.    Pt assessed and discussed with interdisciplinary team at interdisciplinary rounds. Soft/nontender, rounded abdomen. Nonverbal indicators of pain absent. Surgical incisions remain; PEG in place (as of 10/21/24); no edema noted; no pressure ulcers documented. Labs reviewed: elevated BUN (77), Creatinine (3.22), all other electrolytes within normal limits, low eGFR (23), medical team is aware, RD to monitor trends. RD discussed updated recommendations with medical team so as to adequately meet his needs. Medical team receptive. RD to remain available. Please see additional information/recommendations below.    Previous Nutrition Diagnosis: Inadequate Oral Intake related to inability to meet nutritional demands via PO as evidenced by NPO with tube feedings    Active [ x ]  Resolved [   ]    If resolved, new PES:     Goal: Pt to consistently meet at least 75% of EEE via tolerated route that is consistent with goals of care during hospital stay    Nutrition Recommendations:   1. NPO with tube feedings:   **consider the following: Nepro 1.8 via PEG, start at 20mL/hour, increase by 10mL/hour every 4-6 hours to GOAL of 60mL/hour x 18 hours, to provide 1080mL total volume from tube feeding, 1912 calories, 87g protein, 785mL free water; this meets ~27 calories/kg ideal body weight, ~22.4 nonprotein calories/kg ideal body weight, 1.25g protein/kg ideal body weight (70kg); consider 190mL every 4 hours  **continue Banatrol PRN to promote fecal bulk**  *Maintain aspiration precautions at all times; monitor for signs/symptoms of intolerance*  2. Monitor weights (weekly), GI function, skin integrity, chemistry/labs  **electrolytes, BMP, glucose, CBC per MD discretion *renal indices**  3. Pain and bowel regimen per medical team  4. Align nutrition with goals of care at all times  *dietitian has communicated with medical team*    RD to remain available.     Jessica Zamarripa, NICHOLEN, CDN, ext [phone].

## 2024-10-30 NOTE — PROGRESS NOTE ADULT - SUBJECTIVE AND OBJECTIVE BOX
HPI:  Unknown age male, unknown past medical history (reported stroke and MI by coworkers) presented to [facility] with AMS, Pt was working at "[employer]" when he was found down by coworkers. EMS called and pt brought to [facility] ED. Intubated, sedated, started on cardene for SBPs in 200s. CT head showed brain stem bleed. Transferred to NSICU for further management.  (30 Sep 2024 12:55)    OVERNIGHT EVENTS: Seen and examined in 8LA. Unable to elicit any complaints at this time.     HOSPITAL COURSE:    Vital Signs Last 24 Hrs  T(C): 36.7 (29 Oct 2024 23:00), Max: 37.6 (29 Oct 2024 09:19)  T(F): 98 (29 Oct 2024 23:00), Max: 99.7 (29 Oct 2024 09:19)  HR: 94 (29 Oct 2024 23:35) (77 - 96)  BP: 126/87 (29 Oct 2024 23:35) (126/86 - 135/88)  BP(mean): 102 (29 Oct 2024 23:35) (96 - 105)  RR: 18 (29 Oct 2024 23:35) (15 - 18)  SpO2: 92% (29 Oct 2024 23:35) (92% - 98%)    Parameters below as of 29 Oct 2024 23:59  Patient On (Oxygen Delivery Method): tracheostomy collar        I&O's Summary    28 Oct 2024 07:01  -  29 Oct 2024 07:00  --------------------------------------------------------  IN: 315 mL / OUT: 2000 mL / NET: -1685 mL    29 Oct 2024 07:01  -  30 Oct 2024 00:34  --------------------------------------------------------  IN: 460 mL / OUT: 200 mL / NET: 260 mL        PHYSICAL EXAM:  General: NAD, pt is comfortably sitting up in bed, on room air  HEENT: PERRL 3mm briskly reactive, EOMI b/l, face symmetric, tongue midline, neck FROM  Cardiovascular: RRR, S1, S2  Respiratory: breathing non-labored on RA, chest rise symmetric  GI: abd soft, NTND   Neuro: A&Ox3, No aphasia, speech clear, no dysmetria, no pronator drift. Follows commands.  BURROUGHS x4 spontaneously, 5/5 strength in all extremities throughout. Sensation intact  Extremities: distal pulses 2+ x4  Wound/incision:  Drain:     PHYSICAL EXAM:  General: NAD, pt is resting in bed, [place] speaking   HEENT: tracking vertically only,right pupil 4mm sluggish, left pupil 2mm briskly reactive, face symmetric  Cardiovascular: RRR, normal S1 and S2   Respiratory: lungs CTAB, no wheezing, rhonchi, or crackles, +trach collar  GI: normoactive BS to auscultation, abd soft, NTND, +PEG and abdominal binder   Neuro: RUE trace muscle flicker to command, RLE briskly w/d to noxious, LUE  0/5, LLE w/d to noxious  unable to assess sensation due to mental status     TUBES/LINES:  [x] Gabriel  [] Wound Drains  [x] Others- PEG      DIET:  [] NPO  [] Mechanical  [x] Tube feeds    LABS:                        11.7   6.35  )-----------( 266      ( 29 Oct 2024 05:30 )             38.2     10-29    145  |  110[H]  |  72[H]  ----------------------------<  121[H]  4.5   |  21[L]  |  3.28[H]    Ca    9.6      29 Oct 2024 05:30  Phos  3.7     10-29  Mg     2.6     10-29        Urinalysis Basic - ( 29 Oct 2024 05:30 )    Color: x / Appearance: x / SG: x / pH: x  Gluc: 121 mg/dL / Ketone: x  / Bili: x / Urobili: x   Blood: x / Protein: x / Nitrite: x   Leuk Esterase: x / RBC: x / WBC x   Sq Epi: x / Non Sq Epi: x / Bacteria: x          CAPILLARY BLOOD GLUCOSE          Drug Levels: [] N/A    CSF Analysis: [] N/A      Allergies    Allergy Status Unknown    Intolerances      MEDICATIONS:  Antibiotics:    Neuro:  acetaminophen   Oral Liquid .. 650 milliGRAM(s) Oral every 6 hours PRN  oxyCODONE    Solution 5 milliGRAM(s) Oral every 4 hours PRN    Anticoagulation:  heparin   Injectable 5000 Unit(s) SubCutaneous every 8 hours    OTHER:  acetylcysteine 10%  Inhalation 4 milliLiter(s) Inhalation every 12 hours  albuterol/ipratropium for Nebulization 3 milliLiter(s) Nebulizer every 12 hours  amLODIPine   Tablet 10 milliGRAM(s) Oral daily  artificial tears (preservative free) Ophthalmic Solution 1 Drop(s) Right EYE every 4 hours  doxazosin 8 milliGRAM(s) Oral at bedtime  erythromycin   Ointment 1 Application(s) Right EYE every 6 hours  ofloxacin 0.3% Solution 1 Drop(s) Right EYE every 6 hours  petrolatum Ophthalmic Ointment 1 Application(s) Both EYES two times a day  polyethylene glycol 3350 17 Gram(s) Oral two times a day  sodium zirconium cyclosilicate 5 Gram(s) Oral daily    IVF:  calcium acetate 667 milliGRAM(s) Oral three times a day with meals    CULTURES:    RADIOLOGY & ADDITIONAL TESTS:      ASSESSMENT:  43 y/o PMH ?stroke/MI present to [facility] after collapsing at work. Decorticate posturing, vomiting, intubated for airway protection. 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 Transferred to [facility] for further management. s/p trach 10/14. s/p peg 10/21.     AMS    Handoff    MEWS Score    Acute myocardial infarction    CVA (cerebral vascular accident)    Intracerebral hemorrhage of brain stem    Brainstem stroke    Brain stem stroke syndrome    Brain stem hemorrhage    Brain stem stroke syndrome    Hemorrhagic stroke    Brainstem stroke    Encephalopathy acute    Functional quadriplegia    Advanced care planning/counseling discussion    Encounter for palliative care    Pontine hemorrhage    Neurogenic dysphagia    Chronic respiratory failure    Acute kidney injury superimposed on CKD    Acute urinary retention    Hypertensive emergency    Percutaneous tracheal puncture    Altered mental status examination    EGD, with PEG    AMS    SysAdmin_VisitLink        PLAN:  Neuro:  - Neuro/vitals q4hr   - pain control: Tylenol prn, oxy prn  - CTH 9/30: enlarged pontine hemorrhage, CTH 10/3: read stable, CTH 10/25 anisocoric pupils (R sluggish 6mm compared to left 3mm briskly reactive); showing mostly resolved pontine hemorrhage, continued brainstem hypodensity likely edema d/t hemorrhage, no new hemorrhage or infarct, no herniation, mild increase in size of left lateral ventricle  - vEEG (10/3-4)- negative, (10/17-10/19) - negative  - stroke core measures, stroke neuro signed off  - MRI brain w/ w/o contrast 10/12: parenchymal hemorrhage, acute/subacute R cerebellar stroke      CV:  - -160  - HTN: amlodipine 10 mg qd  - echo (9/30) EF 75%    Resp:  - tolerating trach collar 35%  - duonebs/mucomyst q12h    GI:  - Nepro TF via PEG (placed 10/21 by gen surg)  - bowel regimen, last BM 10/26    Renal:  - gabriel placed 10/29 d/t urinary retention   - FW flushes 100cc q6hrs   - hyperK 10/20: lokelma 5mg qd  - hyperPhos: phoslo 667mg TID   - Urinary retenion: Cardura 8 mg   - CKD: trend Cr  - Na goal > 140   - renal US 10/1: echogenicity c/w chronic med renal dz, repeat 10/8: inc renal echogeicity, c/f medical renal disease w increased hydro     Endo:  - A1c 5.4    Heme:  - DVT ppx: SCDs, SQH 5000u q8h     ID:  - afebrile  - 10/15 sputum +actinobacter baumanii, s/p unasyn (10/18-10/23)  - MRSA swab negative (10/2), sputum MSSA + , s/p 1 dose vanc (10/2), CTX (10/2 - 10/4), Ancef (10/4-10/14)     MISC:  - ophtho consult for keratitis, rec erythromycin ointment to rt eye q4hrs, ofloxacin ointment to rt eye QID, artificial tears to rt eye q2hrs, moisture chamber at bedtime    Dispo: SD status, full code, pending placement and emergency medicaid     D/w Dr. D'Amico   Assessment:  Present when checked    []  GCS  E   V  M     Heart Failure: []Acute, [] acute on chronic , []chronic  Heart Failure:  [] Diastolic (HFpEF), [] Systolic (HFrEF), []Combined (HFpEF and HFrEF), [] RHF, [] Pulm HTN, [] Other    [] ANIKA, [] ATN, [] AIN, [] other  [] CKD1, [] CKD2, [] CKD 3, [] CKD 4, [] CKD 5, []ESRD    Encephalopathy: [] Metabolic, [] Hepatic, [] toxic, [] Neurological, [] Other    Abnormal Nurtitional Status: [] malnurtition (see nutrition note), [ ]underweight: BMI < 19, [] morbid obesity: BMI >40, [] Cachexia    [] Sepsis  [] hypovolemic shock,[] cardiogenic shock, [] hemorrhagic shock, [] neuogenic shock  [] Acute Respiratory Failure  []Cerebral edema, [] Brain compression/ herniation,   [] Functional quadriplegia  [] Acute blood loss anemia   HPI:  Unknown age male, unknown past medical history (reported stroke and MI by coworkers) presented to [facility] with AMS, Pt was working at [employer] when he was found down by coworkers. EMS called and pt brought to [facility] ED. Intubated, sedated, started on cardene for SBPs in 200s. CT head showed brain stem bleed. Transferred to NSICU for further management.  (30 Sep 2024 12:55)    OVERNIGHT EVENTS: Seen and examined in 8LA. Unable to elicit any complaints at this time.     HOSPITAL COURSE:  9/30: transferred from [facility]. A line placed. Versed dc'd. Roomate Prasanth at bedside, states pt has family in [place], cannot confirm medications or PMH other than stroke and MI. 250cc bolus 3% given. LR switched to NS. hydralazine 25q8 started, 3% started, switched propofol to precedex   10/1: stability CTH done. Added labetalol, started TF. Palliative consulted. ethics consulted to determine surrogate. febrile 103, pan cx sent  10/2: BD 2, GEORGE overnight. TF resumed. Desatt'd to 80s, FiO2 inc. to 50. Fentanyl given, ABG, CXR ordered. Maxxed on precedex, started on propofol for DARIEN -4 - -5. Precedex dc'd. Duonebs, mucomyst, hypertonic added. 3% dc'd. Cardene dc'd. Start vanc/CTX. Increased labetalol 200q8. MRSA negative, dc'd vanc. ETT pulled back 2cm x 2, good positioning after confirmatory chest xray. Ethics attempting to establish HCP with family. Na 159, starting FW 250q6 for range 150-155.   10/3: BD3, GEORGE o/n, neuro stable. Na elevating, FW increased to 300q6. Dc'd bowel reg for diarrhea. vEEG started. SQH 5000q8 tonight.   10/4: BD 4, albumn bolus, incr. LR to 80 2/2 incr. in Cr, LR to 100cc/hr for uptrending Cr. Started 7% hypersal for 48hrs and SL atropine for inline/oral thick secretions. Dc'd CTX and started ancef for MSSA in the sputum. Nephrology consulted for CKD, f/u recs. SBP 170s, given hydralazine 10mg IVP.   10/5: BD5, o/n 10mg IVP hydralazine given for SBP 170s and started on hydralazine 25q8 via OGT. 10mg IV push labetalol for SBP > 160s. RT placed for diarrhea.   10/6: BD6, o/n FW increased to 350q4 per nephrology recs. IV tylenol for temp 100.6, SBp 160s presumed uncomfortable.   10/7: BD7, overnight pancultured for temp 101.8F.   10/8: BD8. GEORGE. Cr bumped. decreased LR to 75cc/hr. Adding simethicone ATC. incr hydralazine 50mgTID. Incr labetalol 300mgTID. Na 145, decreased FWF to 250q6. Start precedex. FENa consistent with intrinsic kidney injury. Pend repeat renal US. Retaining up to 1.3L, bladder scans q6, straight cath PRN  10/9: BD 9. GEORGE overnight. Neuro stable. abd xray for distention w non-specific gas pattern, OGT to LIWS for morning. duonebs/mucomyst to q8 for improving secretions. Changed tube feeds to Jevity 1.5 20cc/hr, low rate due to abdominal distention, nepro dense and more difficult to digest. Tolerating CPAP, confirmed by ABG.   10/10: BD 10. GEORGE overnight. Neuro stable. (+) gabriel for urinary retention on bladder scan. inc TF to goal rate of 40cc/hr. family leaning toward pursuing trach/PEG. 1/2 amp for FS 81.   10/11: BD 11. GEORGE overnight. Neuro stable. Trach/PEG consults placed.   10/12: BD 12. GEORGE overnight. Neuro stable. MRI brain complete.   10/13: BD 13. Increase flomax. Hold SQH after PM dose for trach tm. IVL.   10/14: BD 14. GEORGE overnight, remains on AC/VC. Gabriel placed for urinary retention. Dc'd free water.  S/p trach with pulm. NGT placed and CXR confirmed in good position.   10/15: BD 15, GEORGE ovn. resumed feeds. spiked 101, pan cx sent.   10/16: BD 16. GEORGE ovn. Lokelma 5mg for K+ 5.4. Started vanc q 24/zosyn for empiric PNA coverage, IVF to 100/hr. PEG held for fever.   10/17: BD 17,  ordered serum osm and urine osm for am. Started sinemet for neurostimulation. Increased cardura to 0.8. Started FW 100q4, dc'd IVF. MRSA negative, dc'd vanc. NGT replaced d/t coiling.   10/18: BD 18, GEORGE overnight, neuro stable. Amantadine added for neurostim. zosyn changed to unasyn for acinetobacter baumannii, failed TOV and required SC  10/19: BD 19, GEORGE ovn. cardura 2mg added for retention. labetalol decreased 200q8, hydralazine decreased 25q8. Gabriel replaced.   10/20: BD20, GEORGE overnight. NGT dislodged, replaced. PEG tomorrow w/ gen surg, FW increased to 150q4 and labetalol decreased to 100q8, lokelma given for hyperkalemia.   10/21: BD 21. POD0 PEG placement with Gen surg. decr labetolol to 50q8, incr. cardura to 0.4, started lokelma and phoslo, dc gabriel POD0 PEG placement with Gen surg.  10/22: BD 22. Plan to start TF today via PEG. dc labetalol, Following ophtho recs. Increased apnea settings - found to be in cheyne-moe respiration. CPAP 5/5.  10/23: BD 23. hydralazine d/c'd, trach collar trial today. Rectal tube placed at 6am.  10/24: BD24, o/n lokelma held due to diarrhea. Free water 100q6 resumed. dc'd tamsulosin, amantadine. Incr'd cardura to 8mg qhs. Dc'd FW. Switched jevity to nepro. gabriel placed for high urine output. Started SL atropine for oral secretions. Dc'd free water.  10/25: BD25, o/n decreased suctioning requirements to > q4hrs, GEORGE. Cr improving, cont phoslo, lokelma held at this time. Gabriel placed yest, cont. Tolerating trach collar. Given 500cc plasmalyte bolus for ANIKA. Dc'd sinemet.   10/26: BD26, o/n resumed lokelma 5mg daily and resumed 100cc free water q6hrs. Change in neuro status with new right pupillary dilation with anisocoria (right pupil 6mm fixed and left pupil 3mm briskly reactive). Given 23.4% NaCl bullet, taken for emergent CTH showing mostly resolved pontine hemorrhage, continued brainstem hypodensity likely edema d/t hemorrhage, no new hemorrhage or infarct, no herniation, mild increase in size of left lateral ventricle. Vitals remaine stable. Na goal > 140.   10/27: BD27, o/n GEORGE.Neuro stable. Pend stepdown with airway bed.   10/28: BD 28. GEORGE overnight. Neuro stable. Miralax ordered. Gabriel removed, pending TOV.  10/29: BD 29. GEORGE o/n. Given 2L NS over 8 hrs for increased BUN/Cr ratio. Gabriel placed for frequent straight cath.   10/30: BD 30.     Vital Signs Last 24 Hrs  T(C): 36.7 (29 Oct 2024 23:00), Max: 37.6 (29 Oct 2024 09:19)  T(F): 98 (29 Oct 2024 23:00), Max: 99.7 (29 Oct 2024 09:19)  HR: 94 (29 Oct 2024 23:35) (77 - 96)  BP: 126/87 (29 Oct 2024 23:35) (126/86 - 135/88)  BP(mean): 102 (29 Oct 2024 23:35) (96 - 105)  RR: 18 (29 Oct 2024 23:35) (15 - 18)  SpO2: 92% (29 Oct 2024 23:35) (92% - 98%)    Parameters below as of 29 Oct 2024 23:59  Patient On (Oxygen Delivery Method): tracheostomy collar        I&O's Summary    28 Oct 2024 07:01  -  29 Oct 2024 07:00  --------------------------------------------------------  IN: 315 mL / OUT: 2000 mL / NET: -1685 mL    29 Oct 2024 07:01  -  30 Oct 2024 00:34  --------------------------------------------------------  IN: 460 mL / OUT: 200 mL / NET: 260 mL      PHYSICAL EXAM:  General: NAD, pt is resting in bed, [place]ales[place] speaking   HEENT: tracking vertically only,right pupil 4mm sluggish, left pupil 2mm briskly reactive, face symmetric  Cardiovascular: RRR, normal S1 and S2   Respiratory: lungs CTAB, no wheezing, rhonchi, or crackles, +trach collar  GI: normoactive BS to auscultation, abd soft, NTND, +PEG and abdominal binder   Neuro: RUE trace muscle flicker to command, RLE briskly w/d to noxious, LUE  0/5, LLE w/d to noxious  unable to assess sensation due to mental status     TUBES/LINES:  [x] Gabriel  [] Wound Drains  [x] Others- PEG      DIET:  [] NPO  [] Mechanical  [x] Tube feeds    LABS:                        11.7   6.35  )-----------( 266      ( 29 Oct 2024 05:30 )             38.2     10-29    145  |  110[H]  |  72[H]  ----------------------------<  121[H]  4.5   |  21[L]  |  3.28[H]    Ca    9.6      29 Oct 2024 05:30  Phos  3.7     10-29  Mg     2.6     10-29        Urinalysis Basic - ( 29 Oct 2024 05:30 )    Color: x / Appearance: x / SG: x / pH: x  Gluc: 121 mg/dL / Ketone: x  / Bili: x / Urobili: x   Blood: x / Protein: x / Nitrite: x   Leuk Esterase: x / RBC: x / WBC x   Sq Epi: x / Non Sq Epi: x / Bacteria: x          CAPILLARY BLOOD GLUCOSE          Drug Levels: [] N/A    CSF Analysis: [] N/A      Allergies    Allergy Status Unknown    Intolerances      MEDICATIONS:  Antibiotics:    Neuro:  acetaminophen   Oral Liquid .. 650 milliGRAM(s) Oral every 6 hours PRN  oxyCODONE    Solution 5 milliGRAM(s) Oral every 4 hours PRN    Anticoagulation:  heparin   Injectable 5000 Unit(s) SubCutaneous every 8 hours    OTHER:  acetylcysteine 10%  Inhalation 4 milliLiter(s) Inhalation every 12 hours  albuterol/ipratropium for Nebulization 3 milliLiter(s) Nebulizer every 12 hours  amLODIPine   Tablet 10 milliGRAM(s) Oral daily  artificial tears (preservative free) Ophthalmic Solution 1 Drop(s) Right EYE every 4 hours  doxazosin 8 milliGRAM(s) Oral at bedtime  erythromycin   Ointment 1 Application(s) Right EYE every 6 hours  ofloxacin 0.3% Solution 1 Drop(s) Right EYE every 6 hours  petrolatum Ophthalmic Ointment 1 Application(s) Both EYES two times a day  polyethylene glycol 3350 17 Gram(s) Oral two times a day  sodium zirconium cyclosilicate 5 Gram(s) Oral daily    IVF:  calcium acetate 667 milliGRAM(s) Oral three times a day with meals    CULTURES:    RADIOLOGY & ADDITIONAL TESTS:      ASSESSMENT:  45 y/o PMH ?stroke/MI present to [facility] after collapsing at work. Decorticate posturing, vomiting, intubated for airway protection. 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 Transferred to [facility] for further management. s/p trach 10/14. s/p peg 10/21.     AMS    Handoff    MEWS Score    Acute myocardial infarction    CVA (cerebral vascular accident)    Intracerebral hemorrhage of brain stem    Brainstem stroke    Brain stem stroke syndrome    Brain stem hemorrhage    Brain stem stroke syndrome    Hemorrhagic stroke    Brainstem stroke    Encephalopathy acute    Functional quadriplegia    Advanced care planning/counseling discussion    Encounter for palliative care    Pontine hemorrhage    Neurogenic dysphagia    Chronic respiratory failure    Acute kidney injury superimposed on CKD    Acute urinary retention    Hypertensive emergency    Percutaneous tracheal puncture    Altered mental status examination    EGD, with PEG    AMS    SysAdmin_VisitLink        PLAN:  Neuro:  - Neuro/vitals q4hr   - pain control: Tylenol prn, oxy prn  - CTH 9/30: enlarged pontine hemorrhage, CTH 10/3: read stable, CTH 10/25 anisocoric pupils (R sluggish 6mm compared to left 3mm briskly reactive); showing mostly resolved pontine hemorrhage, continued brainstem hypodensity likely edema d/t hemorrhage, no new hemorrhage or infarct, no herniation, mild increase in size of left lateral ventricle  - vEEG (10/3-4)- negative, (10/17-10/19) - negative  - stroke core measures, stroke neuro signed off  - MRI brain w/ w/o contrast 10/12: parenchymal hemorrhage, acute/subacute R cerebellar stroke      CV:  - -160  - HTN: amlodipine 10 mg qd  - echo (9/30) EF 75%    Resp:  - tolerating trach collar 35%  - duonebs/mucomyst q12h    GI:  - Nepro TF via PEG (placed 10/21 by gen surg)  - bowel regimen, last BM 10/26    Renal:  - gabriel placed 10/29 d/t urinary retention   - FW flushes 100cc q6hrs   - hyperK 10/20: lokelma 5mg qd  - hyperPhos: phoslo 667mg TID   - Urinary retenion: Cardura 8 mg   - CKD: trend Cr  - Na goal > 140   - renal US 10/1: echogenicity c/w chronic med renal dz, repeat 10/8: inc renal echogeicity, c/f medical renal disease w increased hydro     Endo:  - A1c 5.4    Heme:  - DVT ppx: SCDs, SQH 5000u q8h     ID:  - afebrile  - 10/15 sputum +actinobacter baumanii, s/p unasyn (10/18-10/23)  - MRSA swab negative (10/2), sputum MSSA + , s/p 1 dose vanc (10/2), CTX (10/2 - 10/4), Ancef (10/4-10/14)     MISC:  - ophtho consult for keratitis, rec erythromycin ointment to rt eye q4hrs, ofloxacin ointment to rt eye QID, artificial tears to rt eye q2hrs, moisture chamber at bedtime    Dispo: SD status, full code, pending placement and emergency medicaid     D/w Dr. D'Amico   Assessment:  Present when checked    []  GCS  E   V  M     Heart Failure: []Acute, [] acute on chronic , []chronic  Heart Failure:  [] Diastolic (HFpEF), [] Systolic (HFrEF), []Combined (HFpEF and HFrEF), [] RHF, [] Pulm HTN, [] Other    [] ANIKA, [] ATN, [] AIN, [] other  [] CKD1, [] CKD2, [] CKD 3, [] CKD 4, [] CKD 5, []ESRD    Encephalopathy: [] Metabolic, [] Hepatic, [] toxic, [] Neurological, [] Other    Abnormal Nurtitional Status: [] malnurtition (see nutrition note), [ ]underweight: BMI < 19, [] morbid obesity: BMI >40, [] Cachexia    [] Sepsis  [] hypovolemic shock,[] cardiogenic shock, [] hemorrhagic shock, [] neuogenic shock  [] Acute Respiratory Failure  []Cerebral edema, [] Brain compression/ herniation,   [] Functional quadriplegia  [] Acute blood loss anemia

## 2024-10-30 NOTE — PROGRESS NOTE ADULT - ASSESSMENT
44 YOF with unclear PMH (?stroke, MI) who was found down at work, intubated for airway protection and found to have acute parenchymal hemorrhage within nabil with mass effect (+ acute/subacute right cerebellar infarct) in setting of hypertensive emergency, transferred to [facility] for further neurosurgical care. Hospital course c/b poor neurologic recovery s/p trach-PEG, AUR s/p gabriel, ANIKA on CKD c/b hyperkalemia, HAP s/p amp-sulbactam (EOT 10/23).

## 2024-10-30 NOTE — PROGRESS NOTE ADULT - PROBLEM SELECTOR PLAN 3
Baseline unclear but suspect underlying CKD from uncontrolled HTN, US renal with chronic medical renal disease. Hospital course c/b ATN.   - baseline creat appears 3-3.5 though freq outliers  - persistent hyperkalemia now on lokelma 5g qd  - persistent hyperphosphatemia now on phoslo 776 mg TID  - strict I/O, renally dose medications, avoid nephrotoxins  - BUN up-trending (w/o signs of GIB), net neg per I/O (1.5L UOP), recommend 1L LR over 4hrs, add on LFT to AM labs

## 2024-10-31 LAB
ALBUMIN SERPL ELPH-MCNC: 3.4 G/DL — SIGNIFICANT CHANGE UP (ref 3.3–5)
ALP SERPL-CCNC: 143 U/L — HIGH (ref 40–120)
ALT FLD-CCNC: 136 U/L — HIGH (ref 10–45)
ANION GAP SERPL CALC-SCNC: 12 MMOL/L — SIGNIFICANT CHANGE UP (ref 5–17)
AST SERPL-CCNC: 82 U/L — HIGH (ref 10–40)
BILIRUB SERPL-MCNC: 0.6 MG/DL — SIGNIFICANT CHANGE UP (ref 0.2–1.2)
BUN SERPL-MCNC: 73 MG/DL — HIGH (ref 7–23)
CALCIUM SERPL-MCNC: 9.4 MG/DL — SIGNIFICANT CHANGE UP (ref 8.4–10.5)
CHLORIDE SERPL-SCNC: 114 MMOL/L — HIGH (ref 96–108)
CO2 SERPL-SCNC: 23 MMOL/L — SIGNIFICANT CHANGE UP (ref 22–31)
CREAT SERPL-MCNC: 2.85 MG/DL — HIGH (ref 0.5–1.3)
EGFR: 27 ML/MIN/1.73M2 — LOW
EGFR: 27 ML/MIN/1.73M2 — LOW
GLUCOSE SERPL-MCNC: 117 MG/DL — HIGH (ref 70–99)
HCT VFR BLD CALC: 38.4 % — LOW (ref 39–50)
HGB BLD-MCNC: 11.5 G/DL — LOW (ref 13–17)
MAGNESIUM SERPL-MCNC: 2.5 MG/DL — SIGNIFICANT CHANGE UP (ref 1.6–2.6)
MCHC RBC-ENTMCNC: 28.2 PG — SIGNIFICANT CHANGE UP (ref 27–34)
MCHC RBC-ENTMCNC: 29.9 G/DL — LOW (ref 32–36)
MCV RBC AUTO: 94.1 FL — SIGNIFICANT CHANGE UP (ref 80–100)
NRBC # BLD: 0 /100 WBCS — SIGNIFICANT CHANGE UP (ref 0–0)
NRBC BLD-RTO: 0 /100 WBCS — SIGNIFICANT CHANGE UP (ref 0–0)
PHOSPHATE SERPL-MCNC: 4.2 MG/DL — SIGNIFICANT CHANGE UP (ref 2.5–4.5)
PLATELET # BLD AUTO: 199 K/UL — SIGNIFICANT CHANGE UP (ref 150–400)
POTASSIUM SERPL-MCNC: 4.7 MMOL/L — SIGNIFICANT CHANGE UP (ref 3.5–5.3)
POTASSIUM SERPL-SCNC: 4.7 MMOL/L — SIGNIFICANT CHANGE UP (ref 3.5–5.3)
PROT SERPL-MCNC: 7.5 G/DL — SIGNIFICANT CHANGE UP (ref 6–8.3)
RBC # BLD: 4.08 M/UL — LOW (ref 4.2–5.8)
RBC # FLD: 13.2 % — SIGNIFICANT CHANGE UP (ref 10.3–14.5)
SODIUM SERPL-SCNC: 149 MMOL/L — HIGH (ref 135–145)
WBC # BLD: 5.28 K/UL — SIGNIFICANT CHANGE UP (ref 3.8–10.5)
WBC # FLD AUTO: 5.28 K/UL — SIGNIFICANT CHANGE UP (ref 3.8–10.5)

## 2024-10-31 PROCEDURE — 99232 SBSQ HOSP IP/OBS MODERATE 35: CPT

## 2024-10-31 PROCEDURE — 99231 SBSQ HOSP IP/OBS SF/LOW 25: CPT

## 2024-10-31 RX ADMIN — ERYTHROMYCIN 1 APPLICATION(S): 5 OINTMENT OPHTHALMIC at 00:18

## 2024-10-31 RX ADMIN — OFLOXACIN 1 DROP(S): 3 SOLUTION OPHTHALMIC at 05:29

## 2024-10-31 RX ADMIN — Medication 1 DROP(S): at 06:52

## 2024-10-31 RX ADMIN — ERYTHROMYCIN 1 APPLICATION(S): 5 OINTMENT OPHTHALMIC at 11:00

## 2024-10-31 RX ADMIN — ERYTHROMYCIN 1 APPLICATION(S): 5 OINTMENT OPHTHALMIC at 17:57

## 2024-10-31 RX ADMIN — Medication 667 MILLIGRAM(S): at 12:51

## 2024-10-31 RX ADMIN — HEPARIN SODIUM 5000 UNIT(S): 1000 INJECTION INTRAVENOUS; SUBCUTANEOUS at 15:21

## 2024-10-31 RX ADMIN — HEPARIN SODIUM 5000 UNIT(S): 1000 INJECTION INTRAVENOUS; SUBCUTANEOUS at 05:31

## 2024-10-31 RX ADMIN — IPRATROPIUM BROMIDE AND ALBUTEROL SULFATE 3 MILLILITER(S): .5; 2.5 SOLUTION RESPIRATORY (INHALATION) at 17:57

## 2024-10-31 RX ADMIN — OFLOXACIN 1 DROP(S): 3 SOLUTION OPHTHALMIC at 11:00

## 2024-10-31 RX ADMIN — OFLOXACIN 1 DROP(S): 3 SOLUTION OPHTHALMIC at 17:59

## 2024-10-31 RX ADMIN — POLYETHYLENE GLYCOL 3350 17 GRAM(S): 17 POWDER, FOR SOLUTION ORAL at 05:38

## 2024-10-31 RX ADMIN — OFLOXACIN 1 DROP(S): 3 SOLUTION OPHTHALMIC at 00:18

## 2024-10-31 RX ADMIN — SODIUM ZIRCONIUM CYCLOSILICATE 5 GRAM(S): 5 POWDER, FOR SUSPENSION ORAL at 12:51

## 2024-10-31 RX ADMIN — Medication 1 APPLICATION(S): at 17:59

## 2024-10-31 RX ADMIN — ACETYLCYSTEINE 4 MILLILITER(S): 200 INHALANT RESPIRATORY (INHALATION) at 17:57

## 2024-10-31 RX ADMIN — Medication 1 DROP(S): at 11:02

## 2024-10-31 RX ADMIN — ACETYLCYSTEINE 4 MILLILITER(S): 200 INHALANT RESPIRATORY (INHALATION) at 05:26

## 2024-10-31 RX ADMIN — Medication 667 MILLIGRAM(S): at 17:57

## 2024-10-31 RX ADMIN — ERYTHROMYCIN 1 APPLICATION(S): 5 OINTMENT OPHTHALMIC at 05:29

## 2024-10-31 RX ADMIN — Medication 1 DROP(S): at 15:24

## 2024-10-31 RX ADMIN — Medication 500 MILLILITER(S): at 11:52

## 2024-10-31 RX ADMIN — AMLODIPINE BESYLATE 10 MILLIGRAM(S): 10 TABLET ORAL at 05:27

## 2024-10-31 RX ADMIN — POLYETHYLENE GLYCOL 3350 17 GRAM(S): 17 POWDER, FOR SOLUTION ORAL at 17:57

## 2024-10-31 RX ADMIN — Medication 667 MILLIGRAM(S): at 08:49

## 2024-10-31 RX ADMIN — Medication 1 APPLICATION(S): at 10:05

## 2024-10-31 RX ADMIN — Medication 1 DROP(S): at 03:02

## 2024-10-31 RX ADMIN — IPRATROPIUM BROMIDE AND ALBUTEROL SULFATE 3 MILLILITER(S): .5; 2.5 SOLUTION RESPIRATORY (INHALATION) at 05:26

## 2024-10-31 RX ADMIN — Medication 1 DROP(S): at 18:55

## 2024-10-31 NOTE — PROGRESS NOTE ADULT - PROBLEM SELECTOR PLAN 3
Baseline unclear but suspect underlying CKD from uncontrolled HTN, US renal with chronic medical renal disease. Hospital course c/b ATN.   - baseline creat appears 3-3.5 though freq outliers  - persistent hyperkalemia now on lokelma 5g qd  - persistent hyperphosphatemia now on phoslo 776 mg TID  - strict I/O, renally dose medications, avoid nephrotoxins  - BUN elevated w/o signs of GIB, net neg per I/O, recommend 1L LR over 4hrs and increase FWF 200cc q6hr for hypernatremia

## 2024-10-31 NOTE — PROGRESS NOTE ADULT - SUBJECTIVE AND OBJECTIVE BOX
HPI:  Unknown age male, unknown past medical history (reported stroke and MI by coworkers) presented to [facility] with AMS, Pt was working at [employer] when he was found down by coworkers. EMS called and pt brought to [facility] ED. Intubated, sedated, started on cardene for SBPs in 200s. CT head showed brain stem bleed. Transferred to NSICU for further management.  (30 Sep 2024 12:55)    OVERNIGHT EVENTS: Seen and examined in 8LA. Unable to elicit any complaints.     HOSPITAL COURSE:  9/30: transferred from [facility]. A line placed. Versed dc'd. Cy Rader at bedside, states pt has family in [place], cannot confirm medications or PMH other than stroke and MI. 250cc bolus 3% given. LR switched to NS. hydralazine 25q8 started, 3% started, switched propofol to precedex   10/1: stability CTH done. Added labetalol, started TF. Palliative consulted. ethics consulted to determine surrogate. febrile 103, pan cx sent  10/2: BD 2, GEORGE overnight. TF resumed. Desatt'd to 80s, FiO2 inc. to 50. Fentanyl given, ABG, CXR ordered. Maxxed on precedex, started on propofol for DARIEN -4 - -5. Precedex dc'd. Duonebs, mucomyst, hypertonic added. 3% dc'd. Cardene dc'd. Start vanc/CTX. Increased labetalol 200q8. MRSA negative, dc'd vanc. ETT pulled back 2cm x 2, good positioning after confirmatory chest xray. Ethics attempting to establish HCP with family. Na 159, starting FW 250q6 for range 150-155.   10/3: BD3, GEORGE o/n, neuro stable. Na elevating, FW increased to 300q6. Dc'd bowel reg for diarrhea. vEEG started. SQH 5000q8 tonight.   10/4: BD 4, albumn bolus, incr. LR to 80 2/2 incr. in Cr, LR to 100cc/hr for uptrending Cr. Started 7% hypersal for 48hrs and SL atropine for inline/oral thick secretions. Dc'd CTX and started ancef for MSSA in the sputum. Nephrology consulted for CKD, f/u recs. SBP 170s, given hydralazine 10mg IVP.   10/5: BD5, o/n 10mg IVP hydralazine given for SBP 170s and started on hydralazine 25q8 via OGT. 10mg IV push labetalol for SBP > 160s. RT placed for diarrhea.   10/6: BD6, o/n FW increased to 350q4 per nephrology recs. IV tylenol for temp 100.6, SBp 160s presumed uncomfortable.   10/7: BD7, overnight pancultured for temp 101.8F.   10/8: BD8. GEORGE. Cr bumped. decreased LR to 75cc/hr. Adding simethicone ATC. incr hydralazine 50mgTID. Incr labetalol 300mgTID. Na 145, decreased FWF to 250q6. Start precedex. FENa consistent with intrinsic kidney injury. Pend repeat renal US. Retaining up to 1.3L, bladder scans q6, straight cath PRN  10/9: BD 9. GEORGE overnight. Neuro stable. abd xray for distention w non-specific gas pattern, OGT to LIWS for morning. duonebs/mucomyst to q8 for improving secretions. Changed tube feeds to Jevity 1.5 20cc/hr, low rate due to abdominal distention, nepro dense and more difficult to digest. Tolerating CPAP, confirmed by ABG.   10/10: BD 10. GEORGE overnight. Neuro stable. (+) gabriel for urinary retention on bladder scan. inc TF to goal rate of 40cc/hr. family leaning toward pursuing trach/PEG. 1/2 amp for FS 81.   10/11: BD 11. GEORGE overnight. Neuro stable. Trach/PEG consults placed.   10/12: BD 12. GEORGE overnight. Neuro stable. MRI brain complete.   10/13: BD 13. Increase flomax. Hold SQH after PM dose for trach tm. IVL.   10/14: BD 14. GEORGE overnight, remains on AC/VC. Gabriel placed for urinary retention. Dc'd free water.  S/p trach with pulm. NGT placed and CXR confirmed in good position.   10/15: BD 15, GEORGE ovn. resumed feeds. spiked 101, pan cx sent.   10/16: BD 16. GEORGE ovn. Lokelma 5mg for K+ 5.4. Started vanc q 24/zosyn for empiric PNA coverage, IVF to 100/hr. PEG held for fever.   10/17: BD 17,  ordered serum osm and urine osm for am. Started sinemet for neurostimulation. Increased cardura to 0.8. Started FW 100q4, dc'd IVF. MRSA negative, dc'd vanc. NGT replaced d/t coiling.   10/18: BD 18, GEORGE overnight, neuro stable. Amantadine added for neurostim. zosyn changed to unasyn for acinetobacter baumannii, failed TOV and required SC  10/19: BD 19, GEORGE ovn. cardura 2mg added for retention. labetalol decreased 200q8, hydralazine decreased 25q8. Gabriel replaced.   10/20: BD20, GEORGE overnight. NGT dislodged, replaced. PEG tomorrow w/ gen surg, FW increased to 150q4 and labetalol decreased to 100q8, lokelma given for hyperkalemia.   10/21: BD 21. POD0 PEG placement with Gen surg. decr labetolol to 50q8, incr. cardura to 0.4, started lokelma and phoslo, dc gabriel POD0 PEG placement with Gen surg.  10/22: BD 22. Plan to start TF today via PEG. dc labetalol, Following ophtho recs. Increased apnea settings - found to be in cheyne-moe respiration. CPAP 5/5.  10/23: BD 23. hydralazine d/c'd, trach collar trial today. Rectal tube placed at 6am.  10/24: BD24, o/n lokelma held due to diarrhea. Free water 100q6 resumed. dc'd tamsulosin, amantadine. Incr'd cardura to 8mg qhs. Dc'd FW. Switched jevity to nepro. gabriel placed for high urine output. Started SL atropine for oral secretions. Dc'd free water.  10/25: BD25, o/n decreased suctioning requirements to > q4hrs, GEORGE. Cr improving, cont phoslo, lokelma held at this time. Gabriel placed yest, cont. Tolerating trach collar. Given 500cc plasmalyte bolus for ANIKA. Dc'd sinemet.   10/26: BD26, o/n resumed lokelma 5mg daily and resumed 100cc free water q6hrs. Change in neuro status with new right pupillary dilation with anisocoria (right pupil 6mm fixed and left pupil 3mm briskly reactive). Given 23.4% NaCl bullet, taken for emergent CTH showing mostly resolved pontine hemorrhage, continued brainstem hypodensity likely edema d/t hemorrhage, no new hemorrhage or infarct, no herniation, mild increase in size of left lateral ventricle. Vitals remaine stable. Na goal > 140.   10/27: BD27, o/n GEORGE.Neuro stable. Pend stepdown with airway bed.   10/28: BD 28. GEORGE overnight. Neuro stable. Miralax ordered. Gabriel removed, pending TOV.  10/29: BD 29. GEORGE o/n. Given 2L NS over 8 hrs for increased BUN/Cr ratio. Gabriel placed for frequent straight cath.   10/30: BD 30.   10/31: BD 31. GEORGE overnight.     Vital Signs Last 24 Hrs  T(C): 36.6 (30 Oct 2024 22:48), Max: 37.7 (30 Oct 2024 04:33)  T(F): 97.9 (30 Oct 2024 22:48), Max: 99.9 (30 Oct 2024 04:33)  HR: 74 (31 Oct 2024 00:20) (72 - 98)  BP: 128/89 (31 Oct 2024 00:20) (128/89 - 137/91)  BP(mean): 102 (31 Oct 2024 00:20) (98 - 108)  RR: 16 (31 Oct 2024 00:20) (11 - 16)  SpO2: 98% (31 Oct 2024 00:20) (94% - 98%)    Parameters below as of 31 Oct 2024 00:20  Patient On (Oxygen Delivery Method): tracheostomy collar  O2 Flow (L/min): 10  O2 Concentration (%): 35    I&O's Summary    29 Oct 2024 07:01  -  30 Oct 2024 07:00  --------------------------------------------------------  IN: 860 mL / OUT: 1400 mL / NET: -540 mL    30 Oct 2024 07:01  -  31 Oct 2024 00:39  --------------------------------------------------------  IN: 1370 mL / OUT: 1400 mL / NET: -30 mL        PHYSICAL EXAM:  General: NAD, pt is resting in bed, [place]ij[place] speaking   HEENT: (+) right eye sclera erythematous and injected, tracking vertically only, right pupil 4mm briskly reactive, left pupil 3mm briskly reactive, face appears symmetric, (+) cough/gag/corneals  Cardiovascular: RRR, normal S1 and S2   Respiratory: lungs CTAB, no wheezing, rhonchi, or crackles, +trach collar  GI: normoactive BS to auscultation, abd soft, NTND, +PEG, (+) gabriel  Neuro: RUE HG 4/5, right triceps 3/5, right bicep 0/5. LUE 0/5, b/l LE w/d to noxious.   unable to assess sensation due to mental status     TUBES/LINES:  [x] Gabriel  [] Wound Drains  [] Others      DIET:  [] NPO  [] Mechanical  [x] Tube feeds    LABS:                        12.2   7.91  )-----------( 230      ( 30 Oct 2024 05:30 )             39.3     10-30    145  |  111[H]  |  77[H]  ----------------------------<  117[H]  4.9   |  22  |  3.22[H]    Ca    9.6      30 Oct 2024 05:30  Phos  4.4     10-30  Mg     2.6     10-30        Urinalysis Basic - ( 30 Oct 2024 05:30 )    Color: x / Appearance: x / SG: x / pH: x  Gluc: 117 mg/dL / Ketone: x  / Bili: x / Urobili: x   Blood: x / Protein: x / Nitrite: x   Leuk Esterase: x / RBC: x / WBC x   Sq Epi: x / Non Sq Epi: x / Bacteria: x          CAPILLARY BLOOD GLUCOSE          Drug Levels: [] N/A    CSF Analysis: [] N/A      Allergies    Allergy Status Unknown    Intolerances      MEDICATIONS:  Antibiotics:    Neuro:  acetaminophen   Oral Liquid .. 650 milliGRAM(s) Oral every 6 hours PRN  oxyCODONE    Solution 5 milliGRAM(s) Oral every 4 hours PRN    Anticoagulation:  heparin   Injectable 5000 Unit(s) SubCutaneous every 8 hours    OTHER:  acetylcysteine 10%  Inhalation 4 milliLiter(s) Inhalation every 12 hours  albuterol/ipratropium for Nebulization 3 milliLiter(s) Nebulizer every 12 hours  amLODIPine   Tablet 10 milliGRAM(s) Oral daily  artificial tears (preservative free) Ophthalmic Solution 1 Drop(s) Right EYE every 4 hours  doxazosin 8 milliGRAM(s) Oral at bedtime  erythromycin   Ointment 1 Application(s) Right EYE every 6 hours  ofloxacin 0.3% Solution 1 Drop(s) Right EYE every 6 hours  petrolatum Ophthalmic Ointment 1 Application(s) Both EYES two times a day  polyethylene glycol 3350 17 Gram(s) Oral two times a day  sodium zirconium cyclosilicate 5 Gram(s) Oral daily    IVF:  calcium acetate 667 milliGRAM(s) Oral three times a day with meals    CULTURES:    RADIOLOGY & ADDITIONAL TESTS:  n/a    ASSESSMENT:  43 y/o PMH ?stroke/MI present to [facility] after collapsing at work. Decorticate posturing, vomiting, intubated for airway protection. 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 Transferred to [facility] for further management. s/p trach 10/14. s/p peg 10/21.      AMS  Handoff  MEWS Score  Acute myocardial infarction  CVA (cerebral vascular accident)  Intracerebral hemorrhage of brain stem  Brainstem stroke  Brain stem stroke syndrome  Brain stem hemorrhage  Brain stem stroke syndrome  Hemorrhagic stroke  Brainstem stroke  Encephalopathy acute  Functional quadriplegia  Advanced care planning/counseling discussion  Encounter for palliative care  Pontine hemorrhage  Neurogenic dysphagia  Chronic respiratory failure  Acute kidney injury superimposed on CKD  Acute urinary retention  Hypertensive emergency  Percutaneous tracheal puncture  Altered mental status examination  EGD, with PEG  AMS  [email or web address]        PLAN:  Neuro:  - Neuro/vitals q4hr   - pain control: Tylenol prn, oxy prn  - CTH 9/30: enlarged pontine hemorrhage, CTH 10/3: read stable, CTH 10/25 anisocoric pupils (R sluggish 6mm compared to left 3mm briskly reactive); showing mostly resolved pontine hemorrhage, continued brainstem hypodensity likely edema d/t hemorrhage, no new hemorrhage or infarct, no herniation, mild increase in size of left lateral ventricle  - vEEG (10/3-4)- negative, (10/17-10/19) - negative  - stroke core measures, stroke neuro signed off  - MRI brain w/ w/o contrast 10/12: parenchymal hemorrhage, acute/subacute R cerebellar stroke      CV:  - -160  - HTN: amlodipine 10 mg qd  - echo (9/30) EF 75%    Resp:  - tolerating trach collar 35%  - duonebs/mucomyst q12h    GI:  - Nepro TF via PEG (placed 10/21 by gen surg)  - bowel regimen, last BM 10/29    Renal:  - gabriel placed 10/29 d/t urinary retention   - FW flushes 100cc q6hrs   - hyperK 10/20: lokelma 5mg qd  - hyperPhos: phoslo 667mg TID   - Urinary retenion: Cardura 8 mg   - CKD: trend BUN/Cr  - Na goal > 140   - renal US 10/1: echogenicity c/w chronic med renal dz, repeat 10/8: inc renal echogeicity, c/f medical renal disease w increased hydro     Endo:  - A1c 5.4    Heme:  - DVT ppx: SCDs, SQH 5000u q8h     ID:  - afebrile  - 10/15 sputum +actinobacter baumanii, s/p unasyn (10/18-10/23)  - MRSA swab negative (10/2), sputum MSSA + , s/p 1 dose vanc (10/2), CTX (10/2 - 10/4), Ancef (10/4-10/14)     MISC:  - ophtho consult for keratitis, rec erythromycin ointment to rt eye q4hrs, ofloxacin ointment to rt eye QID, artificial tears to rt eye q2hrs, moisture chamber at bedtime    Dispo: SD status, full code, pending placement and emergency medicaid     D/w Dr. D'Amico   Assessment:  Present when checked    []  GCS  E   V  M     Heart Failure: []Acute, [] acute on chronic , []chronic  Heart Failure:  [] Diastolic (HFpEF), [] Systolic (HFrEF), []Combined (HFpEF and HFrEF), [] RHF, [] Pulm HTN, [] Other    [] ANIKA, [] ATN, [] AIN, [] other  [] CKD1, [] CKD2, [] CKD 3, [] CKD 4, [] CKD 5, []ESRD    Encephalopathy: [] Metabolic, [] Hepatic, [] toxic, [] Neurological, [] Other    Abnormal Nurtitional Status: [] malnurtition (see nutrition note), [ ]underweight: BMI < 19, [] morbid obesity: BMI >40, [] Cachexia    [] Sepsis  [] hypovolemic shock,[] cardiogenic shock, [] hemorrhagic shock, [] neuogenic shock  [] Acute Respiratory Failure  []Cerebral edema, [] Brain compression/ herniation,   [] Functional quadriplegia  [] Acute blood loss anemia

## 2024-10-31 NOTE — PROGRESS NOTE ADULT - SUBJECTIVE AND OBJECTIVE BOX
SUBJECTIVE: NAEON. Patient seen this morning resting in bed - eyes spontaneously open and intermittently tracking. Unable to obtain further history.       DIET:  Diet, NPO with Tube Feed:   Tube Feeding Modality: Gastrostomy  Nepro with Carb Steady (NEPRORTH)  Total Volume for 24 Hours (mL): 1080  Total Number of Cans: 5  Continuous  Starting Tube Feed Rate mL per Hour: 45  Increase Tube Feed Rate by (mL): 10     Every 4 hours  Until Goal Tube Feed Rate (mL per Hour): 60  Tube Feed Duration (in Hours): 18  Tube Feed Start Time: 10:00  Tube Feed Stop Time: 04:00  Banatrol TF     Qty per Day:  2 (10-28-24 @ 16:11) [Active]      MEDICATIONS:  MEDICATIONS  (STANDING):  acetylcysteine 10%  Inhalation 4 milliLiter(s) Inhalation every 12 hours  albuterol/ipratropium for Nebulization 3 milliLiter(s) Nebulizer every 12 hours  amLODIPine   Tablet 10 milliGRAM(s) Oral daily  artificial tears (preservative free) Ophthalmic Solution 1 Drop(s) Right EYE every 4 hours  calcium acetate 667 milliGRAM(s) Oral three times a day with meals  doxazosin 8 milliGRAM(s) Oral at bedtime  erythromycin   Ointment 1 Application(s) Right EYE every 6 hours  heparin   Injectable 5000 Unit(s) SubCutaneous every 8 hours  ofloxacin 0.3% Solution 1 Drop(s) Right EYE every 6 hours  petrolatum Ophthalmic Ointment 1 Application(s) Both EYES two times a day  polyethylene glycol 3350 17 Gram(s) Oral two times a day  sodium zirconium cyclosilicate 5 Gram(s) Oral daily    MEDICATIONS  (PRN):  acetaminophen   Oral Liquid .. 650 milliGRAM(s) Oral every 6 hours PRN Temp greater or equal to 38C (100.4F), Mild Pain (1 - 3)  oxyCODONE    Solution 5 milliGRAM(s) Oral every 4 hours PRN Moderate Pain (4 - 6)      Allergies    Allergy Status Unknown    Intolerances        OBJECTIVE:  Vital Signs Last 24 Hrs  T(C): 36.5 (31 Oct 2024 14:00), Max: 36.8 (30 Oct 2024 18:06)  T(F): 97.7 (31 Oct 2024 14:00), Max: 98.3 (31 Oct 2024 05:00)  HR: 70 (31 Oct 2024 11:49) (70 - 79)  BP: 138/98 (31 Oct 2024 11:40) (124/85 - 142/92)  BP(mean): 113 (31 Oct 2024 11:40) (98 - 113)  RR: 11 (31 Oct 2024 11:49) (11 - 18)  SpO2: 100% (31 Oct 2024 11:49) (95% - 100%)    Parameters below as of 31 Oct 2024 11:49  Patient On (Oxygen Delivery Method): tracheostomy collar  O2 Flow (L/min): 10  O2 Concentration (%): 35  I&O's Summary    30 Oct 2024 07:01  -  31 Oct 2024 07:00  --------------------------------------------------------  IN: 1370 mL / OUT: 2100 mL / NET: -730 mL    31 Oct 2024 07:01  -  31 Oct 2024 16:21  --------------------------------------------------------  IN: 380 mL / OUT: 350 mL / NET: 30 mL        PHYSICAL EXAM:  Gen: resting comfortably, NAD  HEENT: NCAT, MMM  Neck: trach with thick secretions   CV: RRR, no m/r/g, peripheral pulses 2+  Pulm: CTAB, no increased work of breathing, no rales/rhonchi  Abd: soft, ND, NT, PEG with abd binder  Skin: warm and dry  Ext: non-tender, no edema  Neuro: Eyes spontaneously open, does not follow commands, anisocoria   Psych: unable to assess     LABS:                        11.5   5.28  )-----------( 199      ( 31 Oct 2024 05:30 )             38.4     10-31    149[H]  |  114[H]  |  73[H]  ----------------------------<  117[H]  4.7   |  23  |  2.85[H]    Ca    9.4      31 Oct 2024 05:30  Phos  4.2     10-31  Mg     2.5     10-31    TPro  7.5  /  Alb  3.4  /  TBili  0.6  /  DBili  x   /  AST  82[H]  /  ALT  136[H]  /  AlkPhos  143[H]  10-31    LIVER FUNCTIONS - ( 31 Oct 2024 05:30 )  Alb: 3.4 g/dL / Pro: 7.5 g/dL / ALK PHOS: 143 U/L / ALT: 136 U/L / AST: 82 U/L / GGT: x             CAPILLARY BLOOD GLUCOSE        Urinalysis Basic - ( 31 Oct 2024 05:30 )    Color: x / Appearance: x / SG: x / pH: x  Gluc: 117 mg/dL / Ketone: x  / Bili: x / Urobili: x   Blood: x / Protein: x / Nitrite: x   Leuk Esterase: x / RBC: x / WBC x   Sq Epi: x / Non Sq Epi: x / Bacteria: x        MICRODATA:      RADIOLOGY/OTHER STUDIES:  Reviewed

## 2024-11-01 LAB
ANION GAP SERPL CALC-SCNC: 10 MMOL/L — SIGNIFICANT CHANGE UP (ref 5–17)
BUN SERPL-MCNC: 69 MG/DL — HIGH (ref 7–23)
CALCIUM SERPL-MCNC: 9.2 MG/DL — SIGNIFICANT CHANGE UP (ref 8.4–10.5)
CHLORIDE SERPL-SCNC: 114 MMOL/L — HIGH (ref 96–108)
CO2 SERPL-SCNC: 22 MMOL/L — SIGNIFICANT CHANGE UP (ref 22–31)
CREAT SERPL-MCNC: 2.55 MG/DL — HIGH (ref 0.5–1.3)
EGFR: 31 ML/MIN/1.73M2 — LOW
EGFR: 31 ML/MIN/1.73M2 — LOW
GLUCOSE SERPL-MCNC: 131 MG/DL — HIGH (ref 70–99)
HCT VFR BLD CALC: 40 % — SIGNIFICANT CHANGE UP (ref 39–50)
HGB BLD-MCNC: 11.9 G/DL — LOW (ref 13–17)
MAGNESIUM SERPL-MCNC: 2.4 MG/DL — SIGNIFICANT CHANGE UP (ref 1.6–2.6)
MCHC RBC-ENTMCNC: 28.2 PG — SIGNIFICANT CHANGE UP (ref 27–34)
MCHC RBC-ENTMCNC: 29.8 G/DL — LOW (ref 32–36)
MCV RBC AUTO: 94.8 FL — SIGNIFICANT CHANGE UP (ref 80–100)
NRBC # BLD: 0 /100 WBCS — SIGNIFICANT CHANGE UP (ref 0–0)
NRBC BLD-RTO: 0 /100 WBCS — SIGNIFICANT CHANGE UP (ref 0–0)
PHOSPHATE SERPL-MCNC: 4.7 MG/DL — HIGH (ref 2.5–4.5)
PLATELET # BLD AUTO: 160 K/UL — SIGNIFICANT CHANGE UP (ref 150–400)
POTASSIUM SERPL-MCNC: 5 MMOL/L — SIGNIFICANT CHANGE UP (ref 3.5–5.3)
POTASSIUM SERPL-SCNC: 5 MMOL/L — SIGNIFICANT CHANGE UP (ref 3.5–5.3)
RBC # BLD: 4.22 M/UL — SIGNIFICANT CHANGE UP (ref 4.2–5.8)
RBC # FLD: 13.4 % — SIGNIFICANT CHANGE UP (ref 10.3–14.5)
SODIUM SERPL-SCNC: 146 MMOL/L — HIGH (ref 135–145)
WBC # BLD: 8.73 K/UL — SIGNIFICANT CHANGE UP (ref 3.8–10.5)
WBC # FLD AUTO: 8.73 K/UL — SIGNIFICANT CHANGE UP (ref 3.8–10.5)

## 2024-11-01 PROCEDURE — 99232 SBSQ HOSP IP/OBS MODERATE 35: CPT

## 2024-11-01 RX ADMIN — HEPARIN SODIUM 5000 UNIT(S): 1000 INJECTION INTRAVENOUS; SUBCUTANEOUS at 13:33

## 2024-11-01 RX ADMIN — POLYETHYLENE GLYCOL 3350 17 GRAM(S): 17 POWDER, FOR SOLUTION ORAL at 07:09

## 2024-11-01 RX ADMIN — ACETYLCYSTEINE 4 MILLILITER(S): 200 INHALANT RESPIRATORY (INHALATION) at 19:23

## 2024-11-01 RX ADMIN — POLYETHYLENE GLYCOL 3350 17 GRAM(S): 17 POWDER, FOR SOLUTION ORAL at 18:03

## 2024-11-01 RX ADMIN — IPRATROPIUM BROMIDE AND ALBUTEROL SULFATE 3 MILLILITER(S): .5; 2.5 SOLUTION RESPIRATORY (INHALATION) at 18:03

## 2024-11-01 RX ADMIN — ERYTHROMYCIN 1 APPLICATION(S): 5 OINTMENT OPHTHALMIC at 12:43

## 2024-11-01 RX ADMIN — Medication 667 MILLIGRAM(S): at 09:57

## 2024-11-01 RX ADMIN — HEPARIN SODIUM 5000 UNIT(S): 1000 INJECTION INTRAVENOUS; SUBCUTANEOUS at 00:39

## 2024-11-01 RX ADMIN — Medication 1 APPLICATION(S): at 18:12

## 2024-11-01 RX ADMIN — Medication 1 DROP(S): at 21:48

## 2024-11-01 RX ADMIN — Medication 500 MILLILITER(S): at 13:33

## 2024-11-01 RX ADMIN — OFLOXACIN 1 DROP(S): 3 SOLUTION OPHTHALMIC at 00:41

## 2024-11-01 RX ADMIN — OFLOXACIN 1 DROP(S): 3 SOLUTION OPHTHALMIC at 12:44

## 2024-11-01 RX ADMIN — ERYTHROMYCIN 1 APPLICATION(S): 5 OINTMENT OPHTHALMIC at 07:10

## 2024-11-01 RX ADMIN — DOXAZOSIN MESYLATE 8 MILLIGRAM(S): 8 TABLET ORAL at 00:39

## 2024-11-01 RX ADMIN — DOXAZOSIN MESYLATE 8 MILLIGRAM(S): 8 TABLET ORAL at 22:44

## 2024-11-01 RX ADMIN — SODIUM ZIRCONIUM CYCLOSILICATE 5 GRAM(S): 5 POWDER, FOR SUSPENSION ORAL at 12:43

## 2024-11-01 RX ADMIN — OFLOXACIN 1 DROP(S): 3 SOLUTION OPHTHALMIC at 18:04

## 2024-11-01 RX ADMIN — Medication 1 DROP(S): at 00:39

## 2024-11-01 RX ADMIN — Medication 1 DROP(S): at 16:04

## 2024-11-01 RX ADMIN — ERYTHROMYCIN 1 APPLICATION(S): 5 OINTMENT OPHTHALMIC at 00:39

## 2024-11-01 RX ADMIN — ERYTHROMYCIN 1 APPLICATION(S): 5 OINTMENT OPHTHALMIC at 18:04

## 2024-11-01 RX ADMIN — Medication 667 MILLIGRAM(S): at 18:04

## 2024-11-01 RX ADMIN — Medication 1 DROP(S): at 12:42

## 2024-11-01 RX ADMIN — AMLODIPINE BESYLATE 10 MILLIGRAM(S): 10 TABLET ORAL at 07:09

## 2024-11-01 RX ADMIN — HEPARIN SODIUM 5000 UNIT(S): 1000 INJECTION INTRAVENOUS; SUBCUTANEOUS at 22:45

## 2024-11-01 RX ADMIN — HEPARIN SODIUM 5000 UNIT(S): 1000 INJECTION INTRAVENOUS; SUBCUTANEOUS at 07:10

## 2024-11-01 RX ADMIN — OFLOXACIN 1 DROP(S): 3 SOLUTION OPHTHALMIC at 07:11

## 2024-11-01 RX ADMIN — Medication 1 DROP(S): at 07:11

## 2024-11-01 RX ADMIN — IPRATROPIUM BROMIDE AND ALBUTEROL SULFATE 3 MILLILITER(S): .5; 2.5 SOLUTION RESPIRATORY (INHALATION) at 07:09

## 2024-11-01 RX ADMIN — Medication 667 MILLIGRAM(S): at 12:43

## 2024-11-01 RX ADMIN — Medication 1 DROP(S): at 02:37

## 2024-11-01 NOTE — PROGRESS NOTE ADULT - SUBJECTIVE AND OBJECTIVE BOX
SUBJECTIVE: NAEON. Patient seen this morning resting in bed -eyes open but not tracking today.       DIET:  Diet, NPO with Tube Feed:   Tube Feeding Modality: Gastrostomy  Nepro with Carb Steady (NEPRORTH)  Total Volume for 24 Hours (mL): 1080  Total Number of Cans: 5  Continuous  Starting Tube Feed Rate mL per Hour: 45  Increase Tube Feed Rate by (mL): 10     Every 4 hours  Until Goal Tube Feed Rate (mL per Hour): 60  Tube Feed Duration (in Hours): 18  Tube Feed Start Time: 10:00  Tube Feed Stop Time: 04:00  Banatrol TF     Qty per Day:  2 (10-28-24 @ 16:11) [Active]      MEDICATIONS:  MEDICATIONS  (STANDING):  acetylcysteine 10%  Inhalation 4 milliLiter(s) Inhalation every 12 hours  albuterol/ipratropium for Nebulization 3 milliLiter(s) Nebulizer every 12 hours  amLODIPine   Tablet 10 milliGRAM(s) Oral daily  artificial tears (preservative free) Ophthalmic Solution 1 Drop(s) Right EYE every 4 hours  calcium acetate 667 milliGRAM(s) Oral three times a day with meals  doxazosin 8 milliGRAM(s) Oral at bedtime  erythromycin   Ointment 1 Application(s) Right EYE every 6 hours  heparin   Injectable 5000 Unit(s) SubCutaneous every 8 hours  ofloxacin 0.3% Solution 1 Drop(s) Right EYE every 6 hours  petrolatum Ophthalmic Ointment 1 Application(s) Both EYES two times a day  polyethylene glycol 3350 17 Gram(s) Oral two times a day  sodium zirconium cyclosilicate 5 Gram(s) Oral daily    MEDICATIONS  (PRN):  acetaminophen   Oral Liquid .. 650 milliGRAM(s) Oral every 6 hours PRN Temp greater or equal to 38C (100.4F), Mild Pain (1 - 3)  oxyCODONE    Solution 5 milliGRAM(s) Oral every 4 hours PRN Moderate Pain (4 - 6)      Allergies    Allergy Status Unknown    Intolerances        OBJECTIVE:  Vital Signs Last 24 Hrs  T(C): 36.9 (01 Nov 2024 13:59), Max: 36.9 (01 Nov 2024 13:59)  T(F): 98.4 (01 Nov 2024 13:59), Max: 98.4 (01 Nov 2024 13:59)  HR: 73 (01 Nov 2024 09:30) (68 - 80)  BP: 126/87 (01 Nov 2024 09:30) (126/87 - 143/93)  BP(mean): 102 (01 Nov 2024 09:30) (102 - 113)  RR: 12 (01 Nov 2024 09:30) (11 - 13)  SpO2: 99% (01 Nov 2024 09:30) (95% - 100%)    Parameters below as of 01 Nov 2024 09:30  Patient On (Oxygen Delivery Method): tracheostomy collar  O2 Flow (L/min): 8  O2 Concentration (%): 35  I&O's Summary    31 Oct 2024 07:01  -  01 Nov 2024 07:00  --------------------------------------------------------  IN: 2320 mL / OUT: 2050 mL / NET: 270 mL    01 Nov 2024 07:01  -  01 Nov 2024 14:19  --------------------------------------------------------  IN: 0 mL / OUT: 800 mL / NET: -800 mL        PHYSICAL EXAM:  Gen: resting comfortably, NAD  HEENT: NCAT, MMM  Neck: trach with thick secretions   CV: RRR, no m/r/g, peripheral pulses 2+  Pulm: CTAB, no increased work of breathing, no rales/rhonchi  Abd: soft, ND, NT, PEG with abd binder  Skin: warm and dry  Ext: non-tender, no edema  Neuro: Eyes spontaneously open, does not follow commands, anisocoria   Psych: unable to assess     LABS:                        11.9   8.73  )-----------( 160      ( 01 Nov 2024 05:22 )             40.0     11-01    146[H]  |  114[H]  |  69[H]  ----------------------------<  131[H]  5.0   |  22  |  2.55[H]    Ca    9.2      01 Nov 2024 05:22  Phos  4.7     11-01  Mg     2.4     11-01    TPro  7.5  /  Alb  3.4  /  TBili  0.6  /  DBili  x   /  AST  82[H]  /  ALT  136[H]  /  AlkPhos  143[H]  10-31    LIVER FUNCTIONS - ( 31 Oct 2024 05:30 )  Alb: 3.4 g/dL / Pro: 7.5 g/dL / ALK PHOS: 143 U/L / ALT: 136 U/L / AST: 82 U/L / GGT: x             CAPILLARY BLOOD GLUCOSE        Urinalysis Basic - ( 01 Nov 2024 05:22 )    Color: x / Appearance: x / SG: x / pH: x  Gluc: 131 mg/dL / Ketone: x  / Bili: x / Urobili: x   Blood: x / Protein: x / Nitrite: x   Leuk Esterase: x / RBC: x / WBC x   Sq Epi: x / Non Sq Epi: x / Bacteria: x        MICRODATA:      RADIOLOGY/OTHER STUDIES:  Reviewed

## 2024-11-01 NOTE — PROGRESS NOTE ADULT - PROBLEM SELECTOR PLAN 3
Baseline unclear but suspect underlying CKD from uncontrolled HTN, US renal with chronic medical renal disease. Hospital course c/b ATN.   - baseline creat appears 3-3.5 though freq outliers  - persistent hyperkalemia now on lokelma 5g qd  - persistent hyperphosphatemia now on phoslo 776 mg TID  - strict I/O, renally dose medications, avoid nephrotoxins  - BUN/creat improving with IVF (past was net neg daily for past week), recommend additonal 1L IVF today and incr FWF 250cc q6hr, goal is to balance insensible losses to maintain euvolemia Baseline unclear but suspect underlying CKD from uncontrolled HTN, US renal with chronic medical renal disease. Hospital course c/b ATN.   - baseline creat appears 3-3.5 though freq outliers  - persistent hyperkalemia now on lokelma 5g qd  - persistent hyperphosphatemia now on phoslo 776 mg TID  - strict I/O, renally dose medications, avoid nephrotoxins  - BUN/creat improving with IVF (and no longer net neg), recommend additonal 1L IVF today and incr FWF 250cc q6hr, goal is to balance insensible losses to maintain euvolemia

## 2024-11-01 NOTE — PROGRESS NOTE ADULT - SUBJECTIVE AND OBJECTIVE BOX
HPI:  Unknown age male, unknown past medical history (reported stroke and MI by coworkers) presented to [facility] with AMS, Pt was working at [employer] when he was found down by coworkers. EMS called and pt brought to [facility] ED. Intubated, sedated, started on cardene for SBPs in 200s. CT head showed brain stem bleed. Transferred to NSICU for further management.  (30 Sep 2024 12:55)    INTERVAL EVENTS: GEORGE overnight.     S: Seen and examined in 8LA. Unable to elicit any complaints.     HOSPITAL COURSE:  9/30: transferred from [facility]. A line placed. Versed dc'd. Roomate Prasanth at bedside, states pt has family in [place], cannot confirm medications or PMH other than stroke and MI. 250cc bolus 3% given. LR switched to NS. hydralazine 25q8 started, 3% started, switched propofol to precedex   10/1: stability CTH done. Added labetalol, started TF. Palliative consulted. ethics consulted to determine surrogate. febrile 103, pan cx sent  10/2: BD 2, GEORGE overnight. TF resumed. Desatt'd to 80s, FiO2 inc. to 50. Fentanyl given, ABG, CXR ordered. Maxxed on precedex, started on propofol for DARIEN -4 - -5. Precedex dc'd. Duonebs, mucomyst, hypertonic added. 3% dc'd. Cardene dc'd. Start vanc/CTX. Increased labetalol 200q8. MRSA negative, dc'd vanc. ETT pulled back 2cm x 2, good positioning after confirmatory chest xray. Ethics attempting to establish HCP with family. Na 159, starting FW 250q6 for range 150-155.   10/3: BD3, GEORGE o/n, neuro stable. Na elevating, FW increased to 300q6. Dc'd bowel reg for diarrhea. vEEG started. SQH 5000q8 tonight.   10/4: BD 4, albumn bolus, incr. LR to 80 2/2 incr. in Cr, LR to 100cc/hr for uptrending Cr. Started 7% hypersal for 48hrs and SL atropine for inline/oral thick secretions. Dc'd CTX and started ancef for MSSA in the sputum. Nephrology consulted for CKD, f/u recs. SBP 170s, given hydralazine 10mg IVP.   10/5: BD5, o/n 10mg IVP hydralazine given for SBP 170s and started on hydralazine 25q8 via OGT. 10mg IV push labetalol for SBP > 160s. RT placed for diarrhea.   10/6: BD6, o/n FW increased to 350q4 per nephrology recs. IV tylenol for temp 100.6, SBp 160s presumed uncomfortable.   10/7: BD7, overnight pancultured for temp 101.8F.   10/8: BD8. GEORGE. Cr bumped. decreased LR to 75cc/hr. Adding simethicone ATC. incr hydralazine 50mgTID. Incr labetalol 300mgTID. Na 145, decreased FWF to 250q6. Start precedex. FENa consistent with intrinsic kidney injury. Pend repeat renal US. Retaining up to 1.3L, bladder scans q6, straight cath PRN  10/9: BD 9. GEORGE overnight. Neuro stable. abd xray for distention w non-specific gas pattern, OGT to LIWS for morning. duonebs/mucomyst to q8 for improving secretions. Changed tube feeds to Jevity 1.5 20cc/hr, low rate due to abdominal distention, nepro dense and more difficult to digest. Tolerating CPAP, confirmed by ABG.   10/10: BD 10. GEORGE overnight. Neuro stable. (+) gabriel for urinary retention on bladder scan. inc TF to goal rate of 40cc/hr. family leaning toward pursuing trach/PEG. 1/2 amp for FS 81.   10/11: BD 11. GEORGE overnight. Neuro stable. Trach/PEG consults placed.   10/12: BD 12. GEORGE overnight. Neuro stable. MRI brain complete.   10/13: BD 13. Increase flomax. Hold SQH after PM dose for trach tm. IVL.   10/14: BD 14. GEORGE overnight, remains on AC/VC. Gabriel placed for urinary retention. Dc'd free water.  S/p trach with pulm. NGT placed and CXR confirmed in good position.   10/15: BD 15, GEORGE ovn. resumed feeds. spiked 101, pan cx sent.   10/16: BD 16. GEORGE ovn. Lokelma 5mg for K+ 5.4. Started vanc q 24/zosyn for empiric PNA coverage, IVF to 100/hr. PEG held for fever.   10/17: BD 17,  ordered serum osm and urine osm for am. Started sinemet for neurostimulation. Increased cardura to 0.8. Started FW 100q4, dc'd IVF. MRSA negative, dc'd vanc. NGT replaced d/t coiling.   10/18: BD 18, GEORGE overnight, neuro stable. Amantadine added for neurostim. zosyn changed to unasyn for acinetobacter baumannii, failed TOV and required SC  10/19: BD 19, GEORGE ovn. cardura 2mg added for retention. labetalol decreased 200q8, hydralazine decreased 25q8. Gabriel replaced.   10/20: BD20, GEORGE overnight. NGT dislodged, replaced. PEG tomorrow w/ gen surg, FW increased to 150q4 and labetalol decreased to 100q8, lokelma given for hyperkalemia.   10/21: BD 21. POD0 PEG placement with Gen surg. decr labetolol to 50q8, incr. cardura to 0.4, started lokelma and phoslo, dc gabriel POD0 PEG placement with Gen surg.  10/22: BD 22. Plan to start TF today via PEG. dc labetalol, Following ophtho recs. Increased apnea settings - found to be in cheyne-moe respiration. CPAP 5/5.  10/23: BD 23. hydralazine d/c'd, trach collar trial today. Rectal tube placed at 6am.  10/24: BD24, o/n lokelma held due to diarrhea. Free water 100q6 resumed. dc'd tamsulosin, amantadine. Incr'd cardura to 8mg qhs. Dc'd FW. Switched jevity to nepro. gabriel placed for high urine output. Started SL atropine for oral secretions. Dc'd free water.  10/25: BD25, o/n decreased suctioning requirements to > q4hrs, GEORGE. Cr improving, cont phoslo, lokelma held at this time. Gabriel placed yest, cont. Tolerating trach collar. Given 500cc plasmalyte bolus for ANIKA. Dc'd sinemet.   10/26: BD26, o/n resumed lokelma 5mg daily and resumed 100cc free water q6hrs. Change in neuro status with new right pupillary dilation with anisocoria (right pupil 6mm fixed and left pupil 3mm briskly reactive). Given 23.4% NaCl bullet, taken for emergent CTH showing mostly resolved pontine hemorrhage, continued brainstem hypodensity likely edema d/t hemorrhage, no new hemorrhage or infarct, no herniation, mild increase in size of left lateral ventricle. Vitals remaine stable. Na goal > 140.   10/27: BD27, o/n GEORGE.Neuro stable. Pend stepdown with airway bed.   10/28: BD 28. GEORGE overnight. Neuro stable. Miralax ordered. Gabriel removed, pending TOV.  10/29: BD 29. GEORGE o/n. Given 2L NS over 8 hrs for increased BUN/Cr ratio. Gabriel placed for frequent straight cath.   10/30: BD 30.   10/31: BD 31. GEORGE overnight. Na 149, increased free water to 200q6. 1L NS for uptrending BUN.   11/1: BD 32. GEORGE overnight.     Vital Signs Last 24 Hrs  T(C): 36.7 (01 Nov 2024 09:07), Max: 36.8 (31 Oct 2024 18:00)  T(F): 98.1 (01 Nov 2024 09:07), Max: 98.2 (31 Oct 2024 18:00)  HR: 69 (01 Nov 2024 09:00) (68 - 80)  BP: 141/92 (01 Nov 2024 07:20) (132/87 - 143/93)  BP(mean): 110 (01 Nov 2024 07:20) (106 - 113)  RR: 13 (01 Nov 2024 09:00) (11 - 13)  SpO2: 100% (01 Nov 2024 09:00) (95% - 100%)    Parameters below as of 01 Nov 2024 09:00  Patient On (Oxygen Delivery Method): tracheostomy collar    O2 Concentration (%): 35    I&O's Summary    31 Oct 2024 07:01  -  01 Nov 2024 07:00  --------------------------------------------------------  IN: 2320 mL / OUT: 2050 mL / NET: 270 mL      PHYSICAL EXAM:  General: NAD, pt is comfortably resting in bed, Portuguese speaking.   HEENT: tracking vertically only, attempts to track to voice (right > left), + right eye keratitis, right pupil 4mm sluggish, left pupil 3mm briskly reactive, +L corneal, +cough/gag, face symmetric  Cardiovascular: RRR, normal S1 and S2   Respiratory: lungs CTAB, no wheezing, rhonchi, or crackles, +trach collar  GI: normoactive BS, abd soft, nontender, nondistended, +PEG and abdominal binder   Neuro: no verbal output, wiggled toes to command on right foot, squeezes right hand to command, DARRELL/LL trace movement spontaneously and to noxious stimuli, unable to test sensation due to mental status    TUBES/LINES:  [X] Gabriel  [X] Trach  [X] PEG    DIET:  [] NPO  [] Mechanical  [X] Tube feeds    LABS:                        11.9   8.73  )-----------( 160      ( 01 Nov 2024 05:22 )             40.0     11-01    146[H]  |  114[H]  |  69[H]  ----------------------------<  131[H]  5.0   |  22  |  2.55[H]    Ca    9.2      01 Nov 2024 05:22  Phos  4.7     11-01  Mg     2.4     11-01    TPro  7.5  /  Alb  3.4  /  TBili  0.6  /  DBili  x   /  AST  82[H]  /  ALT  136[H]  /  AlkPhos  143[H]  10-31      Urinalysis Basic - ( 01 Nov 2024 05:22 )    Color: x / Appearance: x / SG: x / pH: x  Gluc: 131 mg/dL / Ketone: x  / Bili: x / Urobili: x   Blood: x / Protein: x / Nitrite: x   Leuk Esterase: x / RBC: x / WBC x   Sq Epi: x / Non Sq Epi: x / Bacteria: x      Drug Levels: [] N/A    CSF Analysis: [] N/A    Allergies    Allergy Status Unknown    Intolerances    MEDICATIONS:  Antibiotics:    Neuro:  acetaminophen   Oral Liquid .. 650 milliGRAM(s) Oral every 6 hours PRN  oxyCODONE    Solution 5 milliGRAM(s) Oral every 4 hours PRN    Anticoagulation:  heparin   Injectable 5000 Unit(s) SubCutaneous every 8 hours    OTHER:  acetylcysteine 10%  Inhalation 4 milliLiter(s) Inhalation every 12 hours  albuterol/ipratropium for Nebulization 3 milliLiter(s) Nebulizer every 12 hours  amLODIPine   Tablet 10 milliGRAM(s) Oral daily  artificial tears (preservative free) Ophthalmic Solution 1 Drop(s) Right EYE every 4 hours  doxazosin 8 milliGRAM(s) Oral at bedtime  erythromycin   Ointment 1 Application(s) Right EYE every 6 hours  ofloxacin 0.3% Solution 1 Drop(s) Right EYE every 6 hours  petrolatum Ophthalmic Ointment 1 Application(s) Both EYES two times a day  polyethylene glycol 3350 17 Gram(s) Oral two times a day  sodium zirconium cyclosilicate 5 Gram(s) Oral daily    IVF:  calcium acetate 667 milliGRAM(s) Oral three times a day with meals    CULTURES:  Culture Results:   Mixed gram negative rods including  Moderate Acinetobacter baumannii/nosocomialis group (10-16 @ 00:13)  Culture Results:   No growth at 5 days (10-15 @ 14:55)    ASSESSMENT:  44y Male PMHx ?stroke/MI present to [facility] after collapsing at work. Decorticate posturing, vomiting, intubated for airway protection. 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 Transferred to [facility] for further management. s/p trach 10/14. s/p peg 10/21.      AMS    Handoff    MEWS Score    Acute myocardial infarction    CVA (cerebral vascular accident)    Intracerebral hemorrhage of brain stem    Brainstem stroke    Brain stem stroke syndrome    Brain stem hemorrhage    Brain stem stroke syndrome    Hemorrhagic stroke    Brainstem stroke    Encephalopathy acute    Functional quadriplegia    Advanced care planning/counseling discussion    Encounter for palliative care    Pontine hemorrhage    Neurogenic dysphagia    Chronic respiratory failure    Acute kidney injury superimposed on CKD    Acute urinary retention    Hypertensive emergency    Percutaneous tracheal puncture    Altered mental status examination    EGD, with PEG    AMS    [email or web address]    PLAN:  Neuro:  - Neuro/vitals q4hr   - pain control: Tylenol prn, oxy prn  - CTH 9/30: enlarged pontine hemorrhage, CTH 10/3: read stable, CTH 10/25 anisocoric pupils (R sluggish 6mm compared to left 3mm briskly reactive); showing mostly resolved pontine hemorrhage, continued brainstem hypodensity likely edema d/t hemorrhage, no new hemorrhage or infarct, no herniation, mild increase in size of left lateral ventricle  - vEEG (10/3-4)- negative, (10/17-10/19) - negative  - stroke core measures, stroke neuro signed off  - MRI brain w/ w/o contrast 10/12: parenchymal hemorrhage, acute/subacute R cerebellar stroke      CV:  - -160  - HTN: amlodipine 10 mg qd  - echo (9/30) EF 75%    Resp:  - tolerating trach collar 35%  - duonebs/mucomyst q12h    GI:  - Nepro TF via PEG (placed 10/21 by gen surg)  - bowel regimen, last BM 10/26    Renal:  - pending TOV  - FW flushes 100cc q6hrs   - hyperK 10/20: lokelma 5mg qd  - hyperPhos: phoslo 667mg TID (11/1: 4.7)  - Urinary retenion: Cardura 8 mg   - CKD: trend Cr/BUN (11/1: Cr 2.55 from 2.85, BUN 69 from 73)  - Na goal > 140 (11/1: 146)  - renal US 10/1: echogenicity c/w chronic med renal dz, repeat 10/8: inc renal echogeicity, c/f medical renal disease w increased hydro     Endo:  - A1c 5.4    Heme:  - DVT ppx: SCDs, SQH 5000u q8h     ID:  - afebrile  - 10/15 sputum +actinobacter baumanii, s/p unasyn (10/18-10/23)  - MRSA swab negative (10/2), sputum MSSA + , s/p 1 dose vanc (10/2), CTX (10/2 - 10/4), Ancef (10/4-10/14)     MISC:  - ophtho consult for keratitis, rec erythromycin ointment to rt eye q4hrs, ofloxacin ointment to rt eye QID, artificial tears to rt eye q2hrs, moisture chamber at bedtime    Dispo: SD status, full code, pending placement and emergency medicaid     D/w Dr. D'Amico    HPI:  Unknown age male, unknown past medical history (reported stroke and MI by coworkers) presented to [facility] with AMS, Pt was working at [employer] when he was found down by coworkers. EMS called and pt brought to [facility] ED. Intubated, sedated, started on cardene for SBPs in 200s. CT head showed brain stem bleed. Transferred to NSICU for further management.  (30 Sep 2024 12:55)    INTERVAL EVENTS: GEORGE overnight.     S: Seen and examined in 8LA. Unable to elicit any complaints.     HOSPITAL COURSE:  9/30: transferred from [facility]. A line placed. Versed dc'd. Roomate Prasanth at bedside, states pt has family in [place], cannot confirm medications or PMH other than stroke and MI. 250cc bolus 3% given. LR switched to NS. hydralazine 25q8 started, 3% started, switched propofol to precedex   10/1: stability CTH done. Added labetalol, started TF. Palliative consulted. ethics consulted to determine surrogate. febrile 103, pan cx sent  10/2: BD 2, GEORGE overnight. TF resumed. Desatt'd to 80s, FiO2 inc. to 50. Fentanyl given, ABG, CXR ordered. Maxxed on precedex, started on propofol for DARIEN -4 - -5. Precedex dc'd. Duonebs, mucomyst, hypertonic added. 3% dc'd. Cardene dc'd. Start vanc/CTX. Increased labetalol 200q8. MRSA negative, dc'd vanc. ETT pulled back 2cm x 2, good positioning after confirmatory chest xray. Ethics attempting to establish HCP with family. Na 159, starting FW 250q6 for range 150-155.   10/3: BD3, GEORGE o/n, neuro stable. Na elevating, FW increased to 300q6. Dc'd bowel reg for diarrhea. vEEG started. SQH 5000q8 tonight.   10/4: BD 4, albumn bolus, incr. LR to 80 2/2 incr. in Cr, LR to 100cc/hr for uptrending Cr. Started 7% hypersal for 48hrs and SL atropine for inline/oral thick secretions. Dc'd CTX and started ancef for MSSA in the sputum. Nephrology consulted for CKD, f/u recs. SBP 170s, given hydralazine 10mg IVP.   10/5: BD5, o/n 10mg IVP hydralazine given for SBP 170s and started on hydralazine 25q8 via OGT. 10mg IV push labetalol for SBP > 160s. RT placed for diarrhea.   10/6: BD6, o/n FW increased to 350q4 per nephrology recs. IV tylenol for temp 100.6, SBp 160s presumed uncomfortable.   10/7: BD7, overnight pancultured for temp 101.8F.   10/8: BD8. GEORGE. Cr bumped. decreased LR to 75cc/hr. Adding simethicone ATC. incr hydralazine 50mgTID. Incr labetalol 300mgTID. Na 145, decreased FWF to 250q6. Start precedex. FENa consistent with intrinsic kidney injury. Pend repeat renal US. Retaining up to 1.3L, bladder scans q6, straight cath PRN  10/9: BD 9. GEORGE overnight. Neuro stable. abd xray for distention w non-specific gas pattern, OGT to LIWS for morning. duonebs/mucomyst to q8 for improving secretions. Changed tube feeds to Jevity 1.5 20cc/hr, low rate due to abdominal distention, nepro dense and more difficult to digest. Tolerating CPAP, confirmed by ABG.   10/10: BD 10. GEROGE overnight. Neuro stable. (+) gabriel for urinary retention on bladder scan. inc TF to goal rate of 40cc/hr. family leaning toward pursuing trach/PEG. 1/2 amp for FS 81.   10/11: BD 11. GEORGE overnight. Neuro stable. Trach/PEG consults placed.   10/12: BD 12. GEORGE overnight. Neuro stable. MRI brain complete.   10/13: BD 13. Increase flomax. Hold SQH after PM dose for trach tm. IVL.   10/14: BD 14. GEORGE overnight, remains on AC/VC. Gabriel placed for urinary retention. Dc'd free water.  S/p trach with pulm. NGT placed and CXR confirmed in good position.   10/15: BD 15, GEORGE ovn. resumed feeds. spiked 101, pan cx sent.   10/16: BD 16. GEORGE ovn. Lokelma 5mg for K+ 5.4. Started vanc q 24/zosyn for empiric PNA coverage, IVF to 100/hr. PEG held for fever.   10/17: BD 17,  ordered serum osm and urine osm for am. Started sinemet for neurostimulation. Increased cardura to 0.8. Started FW 100q4, dc'd IVF. MRSA negative, dc'd vanc. NGT replaced d/t coiling.   10/18: BD 18, GEORGE overnight, neuro stable. Amantadine added for neurostim. zosyn changed to unasyn for acinetobacter baumannii, failed TOV and required SC  10/19: BD 19, GEORGE ovn. cardura 2mg added for retention. labetalol decreased 200q8, hydralazine decreased 25q8. Gabriel replaced.   10/20: BD20, GEORGE overnight. NGT dislodged, replaced. PEG tomorrow w/ gen surg, FW increased to 150q4 and labetalol decreased to 100q8, lokelma given for hyperkalemia.   10/21: BD 21. POD0 PEG placement with Gen surg. decr labetolol to 50q8, incr. cardura to 0.4, started lokelma and phoslo, dc gabriel POD0 PEG placement with Gen surg.  10/22: BD 22. Plan to start TF today via PEG. dc labetalol, Following ophtho recs. Increased apnea settings - found to be in cheyne-moe respiration. CPAP 5/5.  10/23: BD 23. hydralazine d/c'd, trach collar trial today. Rectal tube placed at 6am.  10/24: BD24, o/n lokelma held due to diarrhea. Free water 100q6 resumed. dc'd tamsulosin, amantadine. Incr'd cardura to 8mg qhs. Dc'd FW. Switched jevity to nepro. gabriel placed for high urine output. Started SL atropine for oral secretions. Dc'd free water.  10/25: BD25, o/n decreased suctioning requirements to > q4hrs, GEORGE. Cr improving, cont phoslo, lokelma held at this time. Gabriel placed yest, cont. Tolerating trach collar. Given 500cc plasmalyte bolus for ANIKA. Dc'd sinemet.   10/26: BD26, o/n resumed lokelma 5mg daily and resumed 100cc free water q6hrs. Change in neuro status with new right pupillary dilation with anisocoria (right pupil 6mm fixed and left pupil 3mm briskly reactive). Given 23.4% NaCl bullet, taken for emergent CTH showing mostly resolved pontine hemorrhage, continued brainstem hypodensity likely edema d/t hemorrhage, no new hemorrhage or infarct, no herniation, mild increase in size of left lateral ventricle. Vitals remaine stable. Na goal > 140.   10/27: BD27, o/n GEORGE.Neuro stable. Pend stepdown with airway bed.   10/28: BD 28. GEORGE overnight. Neuro stable. Miralax ordered. Gabriel removed, pending TOV.  10/29: BD 29. GEORGE o/n. Given 2L NS over 8 hrs for increased BUN/Cr ratio. Gabriel placed for frequent straight cath.   10/30: BD 30.   10/31: BD 31. GEORGE overnight. Na 149, increased free water to 200q6. 1L NS for uptrending BUN.   11/1: BD 32. GEORGE overnight.     Vital Signs Last 24 Hrs  T(C): 36.7 (01 Nov 2024 09:07), Max: 36.8 (31 Oct 2024 18:00)  T(F): 98.1 (01 Nov 2024 09:07), Max: 98.2 (31 Oct 2024 18:00)  HR: 69 (01 Nov 2024 09:00) (68 - 80)  BP: 141/92 (01 Nov 2024 07:20) (132/87 - 143/93)  BP(mean): 110 (01 Nov 2024 07:20) (106 - 113)  RR: 13 (01 Nov 2024 09:00) (11 - 13)  SpO2: 100% (01 Nov 2024 09:00) (95% - 100%)    Parameters below as of 01 Nov 2024 09:00  Patient On (Oxygen Delivery Method): tracheostomy collar    O2 Concentration (%): 35    I&O's Summary    31 Oct 2024 07:01  -  01 Nov 2024 07:00  --------------------------------------------------------  IN: 2320 mL / OUT: 2050 mL / NET: 270 mL      PHYSICAL EXAM:  General: NAD, pt is comfortably resting in bed, Welsh speaking.   HEENT: tracking vertically only, attempts to track to voice (right > left), + right eye keratitis, right pupil 4mm sluggish, left pupil 3mm briskly reactive, +L corneal, +cough/gag, face symmetric  Cardiovascular: RRR, normal S1 and S2   Respiratory: lungs CTAB, no wheezing, rhonchi, or crackles, +trach collar  GI: normoactive BS, abd soft, nontender, nondistended, +PEG and abdominal binder   Neuro: no verbal output, RUE squeezes right hand to command, HG 4/5, right triceps 3/5, right bicep 0/5. LUE 0/5, b/l LE withdraws to noxious. unable to assess sensation due to mental status    TUBES/LINES:  [X] Gabriel  [X] Trach  [X] PEG    DIET:  [] NPO  [] Mechanical  [X] Tube feeds    LABS:                        11.9   8.73  )-----------( 160      ( 01 Nov 2024 05:22 )             40.0     11-01    146[H]  |  114[H]  |  69[H]  ----------------------------<  131[H]  5.0   |  22  |  2.55[H]    Ca    9.2      01 Nov 2024 05:22  Phos  4.7     11-01  Mg     2.4     11-01    TPro  7.5  /  Alb  3.4  /  TBili  0.6  /  DBili  x   /  AST  82[H]  /  ALT  136[H]  /  AlkPhos  143[H]  10-31      Urinalysis Basic - ( 01 Nov 2024 05:22 )    Color: x / Appearance: x / SG: x / pH: x  Gluc: 131 mg/dL / Ketone: x  / Bili: x / Urobili: x   Blood: x / Protein: x / Nitrite: x   Leuk Esterase: x / RBC: x / WBC x   Sq Epi: x / Non Sq Epi: x / Bacteria: x      Drug Levels: [] N/A    CSF Analysis: [] N/A    Allergies    Allergy Status Unknown    Intolerances    MEDICATIONS:  Antibiotics:    Neuro:  acetaminophen   Oral Liquid .. 650 milliGRAM(s) Oral every 6 hours PRN  oxyCODONE    Solution 5 milliGRAM(s) Oral every 4 hours PRN    Anticoagulation:  heparin   Injectable 5000 Unit(s) SubCutaneous every 8 hours    OTHER:  acetylcysteine 10%  Inhalation 4 milliLiter(s) Inhalation every 12 hours  albuterol/ipratropium for Nebulization 3 milliLiter(s) Nebulizer every 12 hours  amLODIPine   Tablet 10 milliGRAM(s) Oral daily  artificial tears (preservative free) Ophthalmic Solution 1 Drop(s) Right EYE every 4 hours  doxazosin 8 milliGRAM(s) Oral at bedtime  erythromycin   Ointment 1 Application(s) Right EYE every 6 hours  ofloxacin 0.3% Solution 1 Drop(s) Right EYE every 6 hours  petrolatum Ophthalmic Ointment 1 Application(s) Both EYES two times a day  polyethylene glycol 3350 17 Gram(s) Oral two times a day  sodium zirconium cyclosilicate 5 Gram(s) Oral daily    IVF:  calcium acetate 667 milliGRAM(s) Oral three times a day with meals    CULTURES:  Culture Results:   Mixed gram negative rods including  Moderate Acinetobacter baumannii/nosocomialis group (10-16 @ 00:13)  Culture Results:   No growth at 5 days (10-15 @ 14:55)    ASSESSMENT:  44y Male PMHx ?stroke/MI present to [facility] after collapsing at work. Decorticate posturing, vomiting, intubated for airway protection. 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 Transferred to [facility] for further management. s/p trach 10/14. s/p peg 10/21.      AMS    Handoff    MEWS Score    Acute myocardial infarction    CVA (cerebral vascular accident)    Intracerebral hemorrhage of brain stem    Brainstem stroke    Brain stem stroke syndrome    Brain stem hemorrhage    Brain stem stroke syndrome    Hemorrhagic stroke    Brainstem stroke    Encephalopathy acute    Functional quadriplegia    Advanced care planning/counseling discussion    Encounter for palliative care    Pontine hemorrhage    Neurogenic dysphagia    Chronic respiratory failure    Acute kidney injury superimposed on CKD    Acute urinary retention    Hypertensive emergency    Percutaneous tracheal puncture    Altered mental status examination    EGD, with PEG    AMS    [email or web address]    PLAN:  Neuro:  - Neuro/vitals q4hr   - pain control: Tylenol prn, oxy prn  - CTH 9/30: enlarged pontine hemorrhage, CTH 10/3: read stable, CTH 10/25 anisocoric pupils (R sluggish 6mm compared to left 3mm briskly reactive); showing mostly resolved pontine hemorrhage, continued brainstem hypodensity likely edema d/t hemorrhage, no new hemorrhage or infarct, no herniation, mild increase in size of left lateral ventricle  - vEEG (10/3-4)- negative, (10/17-10/19) - negative  - stroke core measures, stroke neuro signed off  - MRI brain w/ w/o contrast 10/12: parenchymal hemorrhage, acute/subacute R cerebellar stroke      CV:  - -160  - HTN: amlodipine 10 mg qd  - echo (9/30) EF 75%    Resp:  - tolerating trach collar 35%  - duonebs/mucomyst q12h    GI:  - Nepro TF via PEG (placed 10/21 by gen surg)  - bowel regimen, last BM 10/26    Renal:  - pending TOV  - FW flushes 100cc q6hrs   - hyperkalemia 10/20: lokelma 5mg qd  - hyperPhos: phoslo 667mg TID (11/1: 4.7)  - Urinary retenion: Cardura 8 mg   - CKD: trend Cr/BUN (11/1: Cr 2.55 from 2.85, BUN 69 from 73)  - Na goal > 140 (11/1: 146)  - renal US 10/1: echogenicity c/w chronic med renal dz, repeat 10/8: inc renal echogeicity, c/f medical renal disease w increased hydro     Endo:  - A1c 5.3    Heme:  - DVT ppx: SCDs, SQH 5000u q8h     ID:  - afebrile  - 10/15 sputum +actinobacter baumanii, s/p unasyn (10/18-10/23)  - MRSA swab negative (10/2), sputum MSSA + , s/p 1 dose vanc (10/2), CTX (10/2 - 10/4), Ancef (10/4-10/14)     MISC:  - ophtho consult for keratitis, rec erythromycin ointment to rt eye q4hrs, ofloxacin ointment to rt eye QID, artificial tears to rt eye q2hrs, moisture chamber at bedtime    Dispo: SD status, full code, pending placement and emergency medicaid     D/w Dr. D'Amico    HPI:  Unknown age male, unknown past medical history (reported stroke and MI by coworkers) presented to [facility] with AMS, Pt was working at [employer] when he was found down by coworkers. EMS called and pt brought to [facility] ED. Intubated, sedated, started on cardene for SBPs in 200s. CT head showed brain stem bleed. Transferred to NSICU for further management.  (30 Sep 2024 12:55)    INTERVAL EVENTS: GEORGE overnight.     S: Seen and examined in 8LA. Unable to elicit any complaints.     HOSPITAL COURSE:  9/30: transferred from [facility]. A line placed. Versed dc'd. Roomate Prasanht at bedside, states pt has family in [place], cannot confirm medications or PMH other than stroke and MI. 250cc bolus 3% given. LR switched to NS. hydralazine 25q8 started, 3% started, switched propofol to precedex   10/1: stability CTH done. Added labetalol, started TF. Palliative consulted. ethics consulted to determine surrogate. febrile 103, pan cx sent  10/2: BD 2, GEORGE overnight. TF resumed. Desatt'd to 80s, FiO2 inc. to 50. Fentanyl given, ABG, CXR ordered. Maxxed on precedex, started on propofol for DARIEN -4 - -5. Precedex dc'd. Duonebs, mucomyst, hypertonic added. 3% dc'd. Cardene dc'd. Start vanc/CTX. Increased labetalol 200q8. MRSA negative, dc'd vanc. ETT pulled back 2cm x 2, good positioning after confirmatory chest xray. Ethics attempting to establish HCP with family. Na 159, starting FW 250q6 for range 150-155.   10/3: BD3, GEORGE o/n, neuro stable. Na elevating, FW increased to 300q6. Dc'd bowel reg for diarrhea. vEEG started. SQH 5000q8 tonight.   10/4: BD 4, albumn bolus, incr. LR to 80 2/2 incr. in Cr, LR to 100cc/hr for uptrending Cr. Started 7% hypersal for 48hrs and SL atropine for inline/oral thick secretions. Dc'd CTX and started ancef for MSSA in the sputum. Nephrology consulted for CKD, f/u recs. SBP 170s, given hydralazine 10mg IVP.   10/5: BD5, o/n 10mg IVP hydralazine given for SBP 170s and started on hydralazine 25q8 via OGT. 10mg IV push labetalol for SBP > 160s. RT placed for diarrhea.   10/6: BD6, o/n FW increased to 350q4 per nephrology recs. IV tylenol for temp 100.6, SBp 160s presumed uncomfortable.   10/7: BD7, overnight pancultured for temp 101.8F.   10/8: BD8. GEORGE. Cr bumped. decreased LR to 75cc/hr. Adding simethicone ATC. incr hydralazine 50mgTID. Incr labetalol 300mgTID. Na 145, decreased FWF to 250q6. Start precedex. FENa consistent with intrinsic kidney injury. Pend repeat renal US. Retaining up to 1.3L, bladder scans q6, straight cath PRN  10/9: BD 9. GEORGE overnight. Neuro stable. abd xray for distention w non-specific gas pattern, OGT to LIWS for morning. duonebs/mucomyst to q8 for improving secretions. Changed tube feeds to Jevity 1.5 20cc/hr, low rate due to abdominal distention, nepro dense and more difficult to digest. Tolerating CPAP, confirmed by ABG.   10/10: BD 10. GEORGE overnight. Neuro stable. (+) gabriel for urinary retention on bladder scan. inc TF to goal rate of 40cc/hr. family leaning toward pursuing trach/PEG. 1/2 amp for FS 81.   10/11: BD 11. GEORGE overnight. Neuro stable. Trach/PEG consults placed.   10/12: BD 12. GEORGE overnight. Neuro stable. MRI brain complete.   10/13: BD 13. Increase flomax. Hold SQH after PM dose for trach tm. IVL.   10/14: BD 14. GEORGE overnight, remains on AC/VC. Gabriel placed for urinary retention. Dc'd free water.  S/p trach with pulm. NGT placed and CXR confirmed in good position.   10/15: BD 15, GEORGE ovn. resumed feeds. spiked 101, pan cx sent.   10/16: BD 16. GEORGE ovn. Lokelma 5mg for K+ 5.4. Started vanc q 24/zosyn for empiric PNA coverage, IVF to 100/hr. PEG held for fever.   10/17: BD 17,  ordered serum osm and urine osm for am. Started sinemet for neurostimulation. Increased cardura to 0.8. Started FW 100q4, dc'd IVF. MRSA negative, dc'd vanc. NGT replaced d/t coiling.   10/18: BD 18, GEORGE overnight, neuro stable. Amantadine added for neurostim. zosyn changed to unasyn for acinetobacter baumannii, failed TOV and required SC  10/19: BD 19, GEORGE ovn. cardura 2mg added for retention. labetalol decreased 200q8, hydralazine decreased 25q8. Gabriel replaced.   10/20: BD20, GEORGE overnight. NGT dislodged, replaced. PEG tomorrow w/ gen surg, FW increased to 150q4 and labetalol decreased to 100q8, lokelma given for hyperkalemia.   10/21: BD 21. POD0 PEG placement with Gen surg. decr labetolol to 50q8, incr. cardura to 0.4, started lokelma and phoslo, dc gabriel POD0 PEG placement with Gen surg.  10/22: BD 22. Plan to start TF today via PEG. dc labetalol, Following ophtho recs. Increased apnea settings - found to be in cheyne-moe respiration. CPAP 5/5.  10/23: BD 23. hydralazine d/c'd, trach collar trial today. Rectal tube placed at 6am.  10/24: BD24, o/n lokelma held due to diarrhea. Free water 100q6 resumed. dc'd tamsulosin, amantadine. Incr'd cardura to 8mg qhs. Dc'd FW. Switched jevity to nepro. gabriel placed for high urine output. Started SL atropine for oral secretions. Dc'd free water.  10/25: BD25, o/n decreased suctioning requirements to > q4hrs, GEORGE. Cr improving, cont phoslo, lokelma held at this time. Gabriel placed yest, cont. Tolerating trach collar. Given 500cc plasmalyte bolus for ANIKA. Dc'd sinemet.   10/26: BD26, o/n resumed lokelma 5mg daily and resumed 100cc free water q6hrs. Change in neuro status with new right pupillary dilation with anisocoria (right pupil 6mm fixed and left pupil 3mm briskly reactive). Given 23.4% NaCl bullet, taken for emergent CTH showing mostly resolved pontine hemorrhage, continued brainstem hypodensity likely edema d/t hemorrhage, no new hemorrhage or infarct, no herniation, mild increase in size of left lateral ventricle. Vitals remaine stable. Na goal > 140.   10/27: BD27, o/n GEORGE.Neuro stable. Pend stepdown with airway bed.   10/28: BD 28. GEORGE overnight. Neuro stable. Miralax ordered. Gabriel removed, pending TOV.  10/29: BD 29. GEORGE o/n. Given 2L NS over 8 hrs for increased BUN/Cr ratio. Gabriel placed for frequent straight cath.   10/30: BD 30.   10/31: BD 31. GEORGE overnight. Na 149, increased free water to 200q6. 1L NS for uptrending BUN.   11/1: BD 32. GEORGE overnight.     Vital Signs Last 24 Hrs  T(C): 36.7 (01 Nov 2024 09:07), Max: 36.8 (31 Oct 2024 18:00)  T(F): 98.1 (01 Nov 2024 09:07), Max: 98.2 (31 Oct 2024 18:00)  HR: 69 (01 Nov 2024 09:00) (68 - 80)  BP: 141/92 (01 Nov 2024 07:20) (132/87 - 143/93)  BP(mean): 110 (01 Nov 2024 07:20) (106 - 113)  RR: 13 (01 Nov 2024 09:00) (11 - 13)  SpO2: 100% (01 Nov 2024 09:00) (95% - 100%)    Parameters below as of 01 Nov 2024 09:00  Patient On (Oxygen Delivery Method): tracheostomy collar    O2 Concentration (%): 35    I&O's Summary    31 Oct 2024 07:01  -  01 Nov 2024 07:00  --------------------------------------------------------  IN: 2320 mL / OUT: 2050 mL / NET: 270 mL      PHYSICAL EXAM:  General: NAD, pt is comfortably resting in bed, Hungarian speaking.   HEENT: tracking vertically only, attempts to track to voice (right > left), + right eye keratitis, right pupil 4mm sluggish, left pupil 3mm briskly reactive, +L corneal, +cough/gag, face symmetric  Cardiovascular: RRR, normal S1 and S2   Respiratory: lungs CTAB, no wheezing, rhonchi, or crackles, +trach collar  GI: normoactive BS, abd soft, nontender, nondistended, +PEG and abdominal binder   Neuro: no verbal output, RUE squeezes right hand to command, HG 4/5, right triceps 3/5, right bicep 0/5. LUE 0/5, b/l LEs withdraw to noxious. unable to assess sensation due to mental status    TUBES/LINES:  [X] Gabriel  [X] Trach  [X] PEG    DIET:  [] NPO  [] Mechanical  [X] Tube feeds    LABS:                        11.9   8.73  )-----------( 160      ( 01 Nov 2024 05:22 )             40.0     11-01    146[H]  |  114[H]  |  69[H]  ----------------------------<  131[H]  5.0   |  22  |  2.55[H]    Ca    9.2      01 Nov 2024 05:22  Phos  4.7     11-01  Mg     2.4     11-01    TPro  7.5  /  Alb  3.4  /  TBili  0.6  /  DBili  x   /  AST  82[H]  /  ALT  136[H]  /  AlkPhos  143[H]  10-31      Urinalysis Basic - ( 01 Nov 2024 05:22 )    Color: x / Appearance: x / SG: x / pH: x  Gluc: 131 mg/dL / Ketone: x  / Bili: x / Urobili: x   Blood: x / Protein: x / Nitrite: x   Leuk Esterase: x / RBC: x / WBC x   Sq Epi: x / Non Sq Epi: x / Bacteria: x      Drug Levels: [] N/A    CSF Analysis: [] N/A    Allergies    Allergy Status Unknown    Intolerances    MEDICATIONS:  Antibiotics:    Neuro:  acetaminophen   Oral Liquid .. 650 milliGRAM(s) Oral every 6 hours PRN  oxyCODONE    Solution 5 milliGRAM(s) Oral every 4 hours PRN    Anticoagulation:  heparin   Injectable 5000 Unit(s) SubCutaneous every 8 hours    OTHER:  acetylcysteine 10%  Inhalation 4 milliLiter(s) Inhalation every 12 hours  albuterol/ipratropium for Nebulization 3 milliLiter(s) Nebulizer every 12 hours  amLODIPine   Tablet 10 milliGRAM(s) Oral daily  artificial tears (preservative free) Ophthalmic Solution 1 Drop(s) Right EYE every 4 hours  doxazosin 8 milliGRAM(s) Oral at bedtime  erythromycin   Ointment 1 Application(s) Right EYE every 6 hours  ofloxacin 0.3% Solution 1 Drop(s) Right EYE every 6 hours  petrolatum Ophthalmic Ointment 1 Application(s) Both EYES two times a day  polyethylene glycol 3350 17 Gram(s) Oral two times a day  sodium zirconium cyclosilicate 5 Gram(s) Oral daily    IVF:  calcium acetate 667 milliGRAM(s) Oral three times a day with meals    CULTURES:  Culture Results:   Mixed gram negative rods including  Moderate Acinetobacter baumannii/nosocomialis group (10-16 @ 00:13)  Culture Results:   No growth at 5 days (10-15 @ 14:55)    ASSESSMENT:  44y Male PMHx ?stroke/MI present to [facility] after collapsing at work. Decorticate posturing, vomiting, intubated for airway protection. 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 Transferred to [facility] for further management. s/p trach 10/14. s/p peg 10/21.      AMS    Handoff    MEWS Score    Acute myocardial infarction    CVA (cerebral vascular accident)    Intracerebral hemorrhage of brain stem    Brainstem stroke    Brain stem stroke syndrome    Brain stem hemorrhage    Brain stem stroke syndrome    Hemorrhagic stroke    Brainstem stroke    Encephalopathy acute    Functional quadriplegia    Advanced care planning/counseling discussion    Encounter for palliative care    Pontine hemorrhage    Neurogenic dysphagia    Chronic respiratory failure    Acute kidney injury superimposed on CKD    Acute urinary retention    Hypertensive emergency    Percutaneous tracheal puncture    Altered mental status examination    EGD, with PEG    AMS    [email or web address]    PLAN:  Neuro:  - Neuro/vitals q4hr   - pain control: Tylenol prn, oxy prn  - CTH 9/30: enlarged pontine hemorrhage, CTH 10/3: read stable, CTH 10/25 anisocoric pupils (R sluggish 6mm compared to left 3mm briskly reactive); showing mostly resolved pontine hemorrhage, continued brainstem hypodensity likely edema d/t hemorrhage, no new hemorrhage or infarct, no herniation, mild increase in size of left lateral ventricle  - vEEG (10/3-4)- negative, (10/17-10/19) - negative  - MRI brain w/ w/o contrast 10/12: parenchymal hemorrhage, acute/subacute R cerebellar stroke      CV:  - -160  - HTN: amlodipine 10 mg qd  - echo (9/30) EF 75%    Resp:  - tolerating trach collar 35%  - duonebs/mucomyst q12h    GI:  - Nepro TF via PEG (placed 10/21 by gen surg)  - bowel regimen, last BM 10/26    Renal:  - pending TOV  - FW flushes 100cc q6hrs   - hyperkalemia 10/20: lokelma 5mg qd  - hyperPhos: phoslo 667mg TID (11/1: 4.7)  - Urinary retenion: Cardura 8 mg   - CKD: trend Cr/BUN (11/1: Cr 2.55 from 2.85, BUN 69 from 73)  - Na goal 135-145 (11/1: 146)  - renal US 10/1: echogenicity c/w chronic med renal dz, repeat 10/8: inc renal echogeicity, c/f medical renal disease w increased hydro     Endo:  - A1c 5.3    Heme:  - DVT ppx: SCDs, SQH 5000u q8h     ID:  - afebrile  - 10/15 sputum +actinobacter baumanii, s/p unasyn (10/18-10/23)  - MRSA swab negative (10/2), sputum MSSA + , s/p 1 dose vanc (10/2), CTX (10/2 - 10/4), Ancef (10/4-10/14)     MISC:  - ophtho consult for keratitis, rec erythromycin ointment to rt eye q4hrs, ofloxacin ointment to rt eye QID, artificial tears to rt eye q2hrs, moisture chamber at bedtime    Dispo: SD status, full code, pending placement and emergency medicaid     D/w Dr. D'Amico

## 2024-11-01 NOTE — PROGRESS NOTE ADULT - ASSESSMENT
44 YOF with unclear PMH (?stroke, MI) who was found down at work, intubated for airway protection and found to have acute parenchymal hemorrhage within nabil with mass effect (+ acute/subacute right cerebellar infarct) in setting of hypertensive emergency, transferred to [facility] for further neurosurgical care. Hospital course c/b poor neurologic recovery s/p trach-PEG, AUR s/p gabriel, ANIKA on CKD c/b hyperkalemia, HAP s/p amp-sulbactam (EOT 10/23).

## 2024-11-02 LAB
ANION GAP SERPL CALC-SCNC: 12 MMOL/L — SIGNIFICANT CHANGE UP (ref 5–17)
BUN SERPL-MCNC: 62 MG/DL — HIGH (ref 7–23)
CALCIUM SERPL-MCNC: 9.2 MG/DL — SIGNIFICANT CHANGE UP (ref 8.4–10.5)
CHLORIDE SERPL-SCNC: 112 MMOL/L — HIGH (ref 96–108)
CO2 SERPL-SCNC: 23 MMOL/L — SIGNIFICANT CHANGE UP (ref 22–31)
CREAT SERPL-MCNC: 2.32 MG/DL — HIGH (ref 0.5–1.3)
EGFR: 34 ML/MIN/1.73M2 — LOW
EGFR: 34 ML/MIN/1.73M2 — LOW
GLUCOSE SERPL-MCNC: 100 MG/DL — HIGH (ref 70–99)
HCT VFR BLD CALC: 40.4 % — SIGNIFICANT CHANGE UP (ref 39–50)
HGB BLD-MCNC: 12.3 G/DL — LOW (ref 13–17)
MAGNESIUM SERPL-MCNC: 2.4 MG/DL — SIGNIFICANT CHANGE UP (ref 1.6–2.6)
MCHC RBC-ENTMCNC: 28.1 PG — SIGNIFICANT CHANGE UP (ref 27–34)
MCHC RBC-ENTMCNC: 30.4 G/DL — LOW (ref 32–36)
MCV RBC AUTO: 92.2 FL — SIGNIFICANT CHANGE UP (ref 80–100)
NRBC # BLD: 0 /100 WBCS — SIGNIFICANT CHANGE UP (ref 0–0)
NRBC BLD-RTO: 0 /100 WBCS — SIGNIFICANT CHANGE UP (ref 0–0)
PHOSPHATE SERPL-MCNC: 3.7 MG/DL — SIGNIFICANT CHANGE UP (ref 2.5–4.5)
PLATELET # BLD AUTO: 182 K/UL — SIGNIFICANT CHANGE UP (ref 150–400)
POTASSIUM SERPL-MCNC: 4.8 MMOL/L — SIGNIFICANT CHANGE UP (ref 3.5–5.3)
POTASSIUM SERPL-SCNC: 4.8 MMOL/L — SIGNIFICANT CHANGE UP (ref 3.5–5.3)
RBC # BLD: 4.38 M/UL — SIGNIFICANT CHANGE UP (ref 4.2–5.8)
RBC # FLD: 13.3 % — SIGNIFICANT CHANGE UP (ref 10.3–14.5)
SODIUM SERPL-SCNC: 147 MMOL/L — HIGH (ref 135–145)
WBC # BLD: 9.73 K/UL — SIGNIFICANT CHANGE UP (ref 3.8–10.5)
WBC # FLD AUTO: 9.73 K/UL — SIGNIFICANT CHANGE UP (ref 3.8–10.5)

## 2024-11-02 PROCEDURE — 99233 SBSQ HOSP IP/OBS HIGH 50: CPT

## 2024-11-02 PROCEDURE — 99231 SBSQ HOSP IP/OBS SF/LOW 25: CPT

## 2024-11-02 RX ADMIN — Medication 1 DROP(S): at 14:52

## 2024-11-02 RX ADMIN — Medication 1 DROP(S): at 10:20

## 2024-11-02 RX ADMIN — IPRATROPIUM BROMIDE AND ALBUTEROL SULFATE 3 MILLILITER(S): .5; 2.5 SOLUTION RESPIRATORY (INHALATION) at 18:40

## 2024-11-02 RX ADMIN — OFLOXACIN 1 DROP(S): 3 SOLUTION OPHTHALMIC at 18:42

## 2024-11-02 RX ADMIN — OFLOXACIN 1 DROP(S): 3 SOLUTION OPHTHALMIC at 06:31

## 2024-11-02 RX ADMIN — OFLOXACIN 1 DROP(S): 3 SOLUTION OPHTHALMIC at 00:13

## 2024-11-02 RX ADMIN — Medication 1 APPLICATION(S): at 06:35

## 2024-11-02 RX ADMIN — HEPARIN SODIUM 5000 UNIT(S): 1000 INJECTION INTRAVENOUS; SUBCUTANEOUS at 21:55

## 2024-11-02 RX ADMIN — DOXAZOSIN MESYLATE 8 MILLIGRAM(S): 8 TABLET ORAL at 21:55

## 2024-11-02 RX ADMIN — POLYETHYLENE GLYCOL 3350 17 GRAM(S): 17 POWDER, FOR SOLUTION ORAL at 18:41

## 2024-11-02 RX ADMIN — HEPARIN SODIUM 5000 UNIT(S): 1000 INJECTION INTRAVENOUS; SUBCUTANEOUS at 06:02

## 2024-11-02 RX ADMIN — ERYTHROMYCIN 1 APPLICATION(S): 5 OINTMENT OPHTHALMIC at 12:25

## 2024-11-02 RX ADMIN — AMLODIPINE BESYLATE 10 MILLIGRAM(S): 10 TABLET ORAL at 05:53

## 2024-11-02 RX ADMIN — Medication 650 MILLIGRAM(S): at 23:02

## 2024-11-02 RX ADMIN — Medication 1 DROP(S): at 21:57

## 2024-11-02 RX ADMIN — ACETYLCYSTEINE 4 MILLILITER(S): 200 INHALANT RESPIRATORY (INHALATION) at 21:58

## 2024-11-02 RX ADMIN — HEPARIN SODIUM 5000 UNIT(S): 1000 INJECTION INTRAVENOUS; SUBCUTANEOUS at 14:52

## 2024-11-02 RX ADMIN — Medication 650 MILLIGRAM(S): at 22:02

## 2024-11-02 RX ADMIN — IPRATROPIUM BROMIDE AND ALBUTEROL SULFATE 3 MILLILITER(S): .5; 2.5 SOLUTION RESPIRATORY (INHALATION) at 06:10

## 2024-11-02 RX ADMIN — ERYTHROMYCIN 1 APPLICATION(S): 5 OINTMENT OPHTHALMIC at 06:30

## 2024-11-02 RX ADMIN — Medication 667 MILLIGRAM(S): at 14:52

## 2024-11-02 RX ADMIN — ACETYLCYSTEINE 4 MILLILITER(S): 200 INHALANT RESPIRATORY (INHALATION) at 07:14

## 2024-11-02 RX ADMIN — Medication 667 MILLIGRAM(S): at 10:20

## 2024-11-02 RX ADMIN — Medication 1 DROP(S): at 18:41

## 2024-11-02 RX ADMIN — ERYTHROMYCIN 1 APPLICATION(S): 5 OINTMENT OPHTHALMIC at 18:40

## 2024-11-02 RX ADMIN — OFLOXACIN 1 DROP(S): 3 SOLUTION OPHTHALMIC at 12:25

## 2024-11-02 RX ADMIN — SODIUM ZIRCONIUM CYCLOSILICATE 5 GRAM(S): 5 POWDER, FOR SUSPENSION ORAL at 12:23

## 2024-11-02 RX ADMIN — Medication 1 DROP(S): at 05:33

## 2024-11-02 RX ADMIN — Medication 667 MILLIGRAM(S): at 18:41

## 2024-11-02 RX ADMIN — Medication 1 DROP(S): at 01:07

## 2024-11-02 RX ADMIN — ERYTHROMYCIN 1 APPLICATION(S): 5 OINTMENT OPHTHALMIC at 00:13

## 2024-11-02 RX ADMIN — POLYETHYLENE GLYCOL 3350 17 GRAM(S): 17 POWDER, FOR SOLUTION ORAL at 06:40

## 2024-11-02 RX ADMIN — Medication 500 MILLILITER(S): at 22:49

## 2024-11-02 RX ADMIN — Medication 1 APPLICATION(S): at 18:45

## 2024-11-02 NOTE — PROGRESS NOTE ADULT - SUBJECTIVE AND OBJECTIVE BOX
HPI:  Unknown age male, unknown past medical history (reported stroke and MI by coworkers) presented to [facility] with AMS, Pt was working at [employer] when he was found down by coworkers. EMS called and pt brought to [facility] ED. Intubated, sedated, started on cardene for SBPs in 200s. CT head showed brain stem bleed. Transferred to NSICU for further management.  (30 Sep 2024 12:55)      HOSPITAL COURSE:  9/30: transferred from [facility]. A line placed. Versed dc'd. Cy Rader at bedside, states pt has family in [place], cannot confirm medications or PMH other than stroke and MI. 250cc bolus 3% given. LR switched to NS. hydralazine 25q8 started, 3% started, switched propofol to precedex   10/1: stability CTH done. Added labetalol, started TF. Palliative consulted. ethics consulted to determine surrogate. febrile 103, pan cx sent  10/2: BD 2, GEORGE overnight. TF resumed. Desatt'd to 80s, FiO2 inc. to 50. Fentanyl given, ABG, CXR ordered. Maxxed on precedex, started on propofol for DARIEN -4 - -5. Precedex dc'd. Duonebs, mucomyst, hypertonic added. 3% dc'd. Cardene dc'd. Start vanc/CTX. Increased labetalol 200q8. MRSA negative, dc'd vanc. ETT pulled back 2cm x 2, good positioning after confirmatory chest xray. Ethics attempting to establish HCP with family. Na 159, starting FW 250q6 for range 150-155.   10/3: BD3, GEORGE o/n, neuro stable. Na elevating, FW increased to 300q6. Dc'd bowel reg for diarrhea. vEEG started. SQH 5000q8 tonight.   10/4: BD 4, albumn bolus, incr. LR to 80 2/2 incr. in Cr, LR to 100cc/hr for uptrending Cr. Started 7% hypersal for 48hrs and SL atropine for inline/oral thick secretions. Dc'd CTX and started ancef for MSSA in the sputum. Nephrology consulted for CKD, f/u recs. SBP 170s, given hydralazine 10mg IVP.   10/5: BD5, o/n 10mg IVP hydralazine given for SBP 170s and started on hydralazine 25q8 via OGT. 10mg IV push labetalol for SBP > 160s. RT placed for diarrhea.   10/6: BD6, o/n FW increased to 350q4 per nephrology recs. IV tylenol for temp 100.6, SBp 160s presumed uncomfortable.   10/7: BD7, overnight pancultured for temp 101.8F.   10/8: BD8. GEORGE. Cr bumped. decreased LR to 75cc/hr. Adding simethicone ATC. incr hydralazine 50mgTID. Incr labetalol 300mgTID. Na 145, decreased FWF to 250q6. Start precedex. FENa consistent with intrinsic kidney injury. Pend repeat renal US. Retaining up to 1.3L, bladder scans q6, straight cath PRN  10/9: BD 9. GEORGE overnight. Neuro stable. abd xray for distention w non-specific gas pattern, OGT to LIWS for morning. duonebs/mucomyst to q8 for improving secretions. Changed tube feeds to Jevity 1.5 20cc/hr, low rate due to abdominal distention, nepro dense and more difficult to digest. Tolerating CPAP, confirmed by ABG.   10/10: BD 10. GEORGE overnight. Neuro stable. (+) gabriel for urinary retention on bladder scan. inc TF to goal rate of 40cc/hr. family leaning toward pursuing trach/PEG. 1/2 amp for FS 81.   10/11: BD 11. GEORGE overnight. Neuro stable. Trach/PEG consults placed.   10/12: BD 12. GEORGE overnight. Neuro stable. MRI brain complete.   10/13: BD 13. Increase flomax. Hold SQH after PM dose for trach tm. IVL.   10/14: BD 14. GEORGE overnight, remains on AC/VC. Gabriel placed for urinary retention. Dc'd free water.  S/p trach with pulm. NGT placed and CXR confirmed in good position.   10/15: BD 15, GEORGE ovn. resumed feeds. spiked 101, pan cx sent.   10/16: BD 16. GEORGE ovn. Lokelma 5mg for K+ 5.4. Started vanc q 24/zosyn for empiric PNA coverage, IVF to 100/hr. PEG held for fever.   10/17: BD 17,  ordered serum osm and urine osm for am. Started sinemet for neurostimulation. Increased cardura to 0.8. Started FW 100q4, dc'd IVF. MRSA negative, dc'd vanc. NGT replaced d/t coiling.   10/18: BD 18, GEORGE overnight, neuro stable. Amantadine added for neurostim. zosyn changed to unasyn for acinetobacter baumannii, failed TOV and required SC  10/19: BD 19, GEORGE ovn. cardura 2mg added for retention. labetalol decreased 200q8, hydralazine decreased 25q8. Gabriel replaced.   10/20: BD20, GEORGE overnight. NGT dislodged, replaced. PEG tomorrow w/ gen surg, FW increased to 150q4 and labetalol decreased to 100q8, lokelma given for hyperkalemia.   10/21: BD 21. POD0 PEG placement with Gen surg. decr labetolol to 50q8, incr. cardura to 0.4, started lokelma and phoslo, dc gabriel POD0 PEG placement with Gen surg.  10/22: BD 22. Plan to start TF today via PEG. dc labetalol, Following ophtho recs. Increased apnea settings - found to be in cheyne-moe respiration. CPAP 5/5.  10/23: BD 23. hydralazine d/c'd, trach collar trial today. Rectal tube placed at 6am.  10/24: BD24, o/n lokelma held due to diarrhea. Free water 100q6 resumed. dc'd tamsulosin, amantadine. Incr'd cardura to 8mg qhs. Dc'd FW. Switched jevity to nepro. gabriel placed for high urine output. Started SL atropine for oral secretions. Dc'd free water.  10/25: BD25, o/n decreased suctioning requirements to > q4hrs, GEORGE. Cr improving, cont phoslo, lokelma held at this time. Gabreil placed yest, cont. Tolerating trach collar. Given 500cc plasmalyte bolus for ANIKA. Dc'd sinemet.   10/26: BD26, o/n resumed lokelma 5mg daily and resumed 100cc free water q6hrs. Change in neuro status with new right pupillary dilation with anisocoria (right pupil 6mm fixed and left pupil 3mm briskly reactive). Given 23.4% NaCl bullet, taken for emergent CTH showing mostly resolved pontine hemorrhage, continued brainstem hypodensity likely edema d/t hemorrhage, no new hemorrhage or infarct, no herniation, mild increase in size of left lateral ventricle. Vitals remaine stable. Na goal > 140.   10/27: BD27, o/n GEORGE.Neuro stable. Pend stepdown with airway bed.   10/28: BD 28. GEORGE overnight. Neuro stable. Miralax ordered. Gabriel removed, pending TOV.  10/29: BD 29. GEORGE o/n. Given 2L NS over 8 hrs for increased BUN/Cr ratio. Gabriel placed for frequent straight cath.   10/30: BD 30.   10/31: BD 31. GEORGE overnight. Na 149, increased free water to 200q6. 1L NS for uptrending BUN.   11/1: BD 32. GEORGE overnight. Given 1L NS for dehydration. Na 146, increased FW to 250q6.   11/2: BD 33 GEORGE overnight, neuro stable     OVERNIGHT EVENTS: Pt seen and examined in bed, unable to offer any complaints at this time     Vital Signs Last 24 Hrs  T(C): 36.9 (02 Nov 2024 00:00), Max: 36.9 (01 Nov 2024 13:59)  T(F): 98.4 (02 Nov 2024 00:00), Max: 98.4 (01 Nov 2024 13:59)  HR: 87 (02 Nov 2024 00:00) (64 - 87)  BP: 149/100 (02 Nov 2024 00:00) (126/87 - 152/97)  BP(mean): 110 (01 Nov 2024 16:00) (102 - 113)  RR: 16 (02 Nov 2024 00:00) (11 - 17)  SpO2: 96% (02 Nov 2024 00:00) (95% - 100%)    Parameters below as of 02 Nov 2024 00:00  Patient On (Oxygen Delivery Method): room air        I&O's Summary    31 Oct 2024 07:01  -  01 Nov 2024 07:00  --------------------------------------------------------  IN: 2320 mL / OUT: 2050 mL / NET: 270 mL    01 Nov 2024 07:01  -  02 Nov 2024 01:23  --------------------------------------------------------  IN: 250 mL / OUT: 800 mL / NET: -550 mL        PHYSICAL EXAM:  Constitutional: Pt found in bed in NAD   ENT: R pupil 5mm and sluggish, L pupil 3mm and reactive, tracks vertically   Respiratory: + trach, breathing non-labored, symmetrical chest wall movement  Cardiovascuar: RRR, no murmurs  Gastrointestinal: + PEG, abdomen soft, non tender  Neurological:  AAOX0. + L corneal, +cough/gag  LUE 0/5, RUE tricep 3/5 biceps 0/5 HG 4/5, LE withdraw from noxious b/l  Pronator Drift: unable to assess   Wounds/Drains: N/A    TUBES/LINES:  [x] Gabriel  [] Lumbar Drain  [] Wound Drains  [] Others      DIET:  [] NPO  [] Mechanical  [x] Tube feeds - via PEG    LABS:                        11.9   8.73  )-----------( 160      ( 01 Nov 2024 05:22 )             40.0     11-01    146[H]  |  114[H]  |  69[H]  ----------------------------<  131[H]  5.0   |  22  |  2.55[H]    Ca    9.2      01 Nov 2024 05:22  Phos  4.7     11-01  Mg     2.4     11-01    TPro  7.5  /  Alb  3.4  /  TBili  0.6  /  DBili  x   /  AST  82[H]  /  ALT  136[H]  /  AlkPhos  143[H]  10-31      Urinalysis Basic - ( 01 Nov 2024 05:22 )    Color: x / Appearance: x / SG: x / pH: x  Gluc: 131 mg/dL / Ketone: x  / Bili: x / Urobili: x   Blood: x / Protein: x / Nitrite: x   Leuk Esterase: x / RBC: x / WBC x   Sq Epi: x / Non Sq Epi: x / Bacteria: x          CAPILLARY BLOOD GLUCOSE          Drug Levels: [] N/A    CSF Analysis: [] N/A      Allergies    Allergy Status Unknown    Intolerances      MEDICATIONS:  Antibiotics:    Neuro:  acetaminophen   Oral Liquid .. 650 milliGRAM(s) Oral every 6 hours PRN  oxyCODONE    Solution 5 milliGRAM(s) Oral every 4 hours PRN    Anticoagulation:  heparin   Injectable 5000 Unit(s) SubCutaneous every 8 hours    OTHER:  acetylcysteine 10%  Inhalation 4 milliLiter(s) Inhalation every 12 hours  albuterol/ipratropium for Nebulization 3 milliLiter(s) Nebulizer every 12 hours  amLODIPine   Tablet 10 milliGRAM(s) Oral daily  artificial tears (preservative free) Ophthalmic Solution 1 Drop(s) Right EYE every 4 hours  doxazosin 8 milliGRAM(s) Oral at bedtime  erythromycin   Ointment 1 Application(s) Right EYE every 6 hours  ofloxacin 0.3% Solution 1 Drop(s) Right EYE every 6 hours  petrolatum Ophthalmic Ointment 1 Application(s) Both EYES two times a day  polyethylene glycol 3350 17 Gram(s) Oral two times a day  sodium zirconium cyclosilicate 5 Gram(s) Oral daily    IVF:  calcium acetate 667 milliGRAM(s) Oral three times a day with meals    CULTURES:    RADIOLOGY & ADDITIONAL TESTS:      ASSESSMENT:  43 y/o PMH ?stroke/MI present to [facility] after collapsing at work. Decorticate posturing, vomiting, intubated for airway protection. 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 Transferred to [facility] for further management. s/p trach 10/14. s/p peg 10/21.      PLAN:  Neuro:  - Neuro/vitals q8hr   - pain control: Tylenol prn, oxy prn  - CTH 9/30: enlarged pontine hemorrhage, CTH 10/3: read stable, CTH 10/25 anisocoric pupils (R sluggish 6mm compared to left 3mm briskly reactive); showing mostly resolved pontine hemorrhage, continued brainstem hypodensity likely edema d/t hemorrhage, no new hemorrhage or infarct, no herniation, mild increase in size of left lateral ventricle  - vEEG (10/3-4)- negative, (10/17-10/19) - negative  - stroke core measures, stroke neuro signed off  - MRI brain w/ w/o contrast 10/12: parenchymal hemorrhage, acute/subacute R cerebellar stroke      CV:  - -160  - HTN: amlodipine 10 mg qd  - echo (9/30) EF 75%    Resp:  - tolerating trach collar 35%  - duonebs/mucomyst q12h    GI:  - Nepro TF via PEG (placed 10/21 by gen surg)  - bowel regimen, last BM 10/31    Renal:  - gabriel placed 10/29 d/t urinary retention   - FW flushes 250cc q6hrs   - hyperK 10/20: lokelma 5mg qd  - hyperPhos: phoslo 667mg TID   - Urinary retenion: Cardura 8 mg   - CKD: trend BUN/Cr  - Na goal 135-145  - renal US 10/1: echogenicity c/w chronic med renal dz, repeat 10/8: inc renal echogeicity, c/f medical renal disease w increased hydro     Endo:  - A1c 5.4    Heme:  - DVT ppx: SCDs, SQH 5000u q8h     ID:  - afebrile  - 10/15 sputum +actinobacter baumanii, s/p unasyn (10/18-10/23)  - MRSA swab negative (10/2), sputum MSSA + , s/p 1 dose vanc (10/2), CTX (10/2 - 10/4), Ancef (10/4-10/14)     MISC:  - ophtho consult for keratitis, rec erythromycin ointment to rt eye q4hrs, ofloxacin ointment to rt eye QID, artificial tears to rt eye q2hrs, moisture chamber at bedtime    Dispo: SD status, full code, pending placement and emergency medicaid     D/w Dr. D'Amico

## 2024-11-02 NOTE — PROGRESS NOTE ADULT - ASSESSMENT
44 YOF with unclear PMH (?stroke, MI) who was found down at work, intubated for airway protection and found to have acute parenchymal hemorrhage within nabil with mass effect (+ acute/subacute right cerebellar infarct) in setting of hypertensive emergency, transferred to [facility] for further neurosurgical care. Hospital course c/b poor neurologic recovery s/p trach-PEG, AUR s/p gabriel, ANIKA on CKD c/b hyperkalemia, HAP s/p amp-sulbactam (EOT 10/23).    #Pontine hemorrhage.   -Pt was found to have acute parenchymal hemorrhage within nabil with mass effect (+ acute/subacute right cerebellar infarct) in setting of hypertensive emergency. MRI brain also demonstrated acute/subacute R cerebellar stroke.   - Continue management as per NSG, neuro checks  -Will continue pain and bowel regimen as per NSGY     # Hypertensive emergency (resolved)  -Will continue to monitor BP   -Will  continue amlodipine 10mg daily     # Acute kidney injury superimposed on CKD.   #Hypernatremia  _ Baseline unclear but suspect underlying CKD from uncontrolled HTN, US renal with chronic medical renal disease. Hospital course c/b ATN.   -Will continue to monitor creatinine   - Will continue to renally dose medications and  avoid nephrotoxins  - BUN/creat continue to improve; Will continue to monitor BUN/creatinine as well as Na and continue FWF 250cc q6hr    #Chronic respiratory failure.   -Pt s/p percutaneous trach by pulm on 10/14/24  -Pt is  currently on trach collar   - cont routine trach care, frequent suctioning  - duo-neb with mucomyst q6hr PRN    # Neurogenic dysphagia.   -Pt s/p PEG with surgery 10/21/24  - TF per nutrition  - aspiration precautions and elevation of HOB.    #Acute urinary retention.   -Pt s/p Gabriel placement on 10/24 for urinary retention, failed TOV 10/27.   - Continue doxazosin 8mg.    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    #DVT prop  -Venodyne b/l 2/2 brain hemorrhage     #Dispo:   TBD; pt is pending PRUCOL application    55 minutes spent on total encounter. The necessity of the time spent during the encounter on this date of service was due to:    Review of hospital course, labs, vitals, medical records.  Bedside exam   Discussed plan of care with primary team   Documenting the encounter.     44 YOF with unclear PMH (?stroke, MI) who was found down at work, intubated for airway protection and found to have acute parenchymal hemorrhage within nabil with mass effect (+ acute/subacute right cerebellar infarct) in setting of hypertensive emergency, transferred to [facility] for further neurosurgical care. Hospital course c/b poor neurologic recovery s/p trach-PEG, AUR s/p gabriel, ANIKA on CKD c/b hyperkalemia, HAP s/p amp-sulbactam (EOT 10/23).    #Pontine hemorrhage.   -Pt was found to have acute parenchymal hemorrhage within nabil with mass effect (+ acute/subacute right cerebellar infarct) in setting of hypertensive emergency. MRI brain also demonstrated acute/subacute R cerebellar stroke.   - Continue management as per NSG, neuro checks  -Will continue pain and bowel regimen as per NSGY     # Hypertensive emergency (resolved)  -Will continue to monitor BP   -Will  continue amlodipine 10mg daily     # Acute kidney injury superimposed on CKD.   #Hypernatremia  #Hyperkalemia (resolved)  #Hyperphosphatemia ( resolved)   -Baseline unclear but suspect underlying CKD from uncontrolled HTN, US renal with chronic medical renal disease. Hospital course c/b ATN.   -Will continue to monitor creatinine   -Will continue lokelma 5g qd for hyperkalemia   -Will continue  phoslo 776 mg TID for hyperphosphatemia  - Will continue to renally dose medications and  avoid nephrotoxins  - BUN/creat continue to improve; Will continue to monitor BUN/creatinine as well as Na and continue FWF 250cc q6hr    #Chronic respiratory failure.   -Pt s/p percutaneous trach by pulm on 10/14/24  -Pt is  currently on trach collar   - cont routine trach care, frequent suctioning  - duo-neb with mucomyst q6hr PRN    # Neurogenic dysphagia.   -Pt s/p PEG with surgery 10/21/24  - TF per nutrition  - aspiration precautions and elevation of HOB.    #Acute urinary retention.   -Pt s/p Gabriel placement on 10/24 for urinary retention, failed TOV 10/27.   - Continue doxazosin 8mg.    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    #DVT prop  -Venodyne b/l 2/2 brain hemorrhage     #Dispo:   TBD; pt is pending PRUCOL application    55 minutes spent on total encounter. The necessity of the time spent during the encounter on this date of service was due to:    Review of hospital course, labs, vitals, medical records.  Bedside exam   Discussed plan of care with primary team   Documenting the encounter.

## 2024-11-02 NOTE — PROGRESS NOTE ADULT - SUBJECTIVE AND OBJECTIVE BOX
Patient was seen and examined at bedside. Case discuss with the primary team. Pt without any events overnight.     OBJECTIVE:  Vital Signs Last 24 Hrs  T(C): 37.2 (02 Nov 2024 14:09), Max: 37.2 (02 Nov 2024 14:09)  T(F): 99 (02 Nov 2024 14:09), Max: 99 (02 Nov 2024 14:09)  HR: 89 (02 Nov 2024 14:09) (64 - 89)  BP: 157/100 (02 Nov 2024 14:09) (134/94 - 157/100)  BP(mean): 110 (01 Nov 2024 16:00) (110 - 110)  RR: 19 (02 Nov 2024 14:09) (11 - 19)  SpO2: 99% (02 Nov 2024 14:09) (96% - 100%)    Parameters below as of 02 Nov 2024 14:09  Patient On (Oxygen Delivery Method): tracheostomy collar      PHYSICAL EXAM:  Gen: resting comfortably, NAD  HEENT: NCAT, MMM  Neck: trach with thick secretions   CV: RRR, no m/r/g, peripheral pulses 2+  Pulm: CTAB, no increased work of breathing, no rales/rhonchi  Abd: soft, ND, NT, PEG with abd binder  Skin: warm and dry  Ext: non-tender, no edema  Neuro: Eyes spontaneously open, does not follow commands  Psych: unable to assess     LABS:                        12.3   9.73  )-----------( 182      ( 02 Nov 2024 05:30 )             40.4     147[H]  |  112[H]  |  62[H]  ----------------------------<  100[H]  4.8   |  23  |  2.32[H]    Ca    9.2      02 Nov 2024 05:30  Phos  3.7     11-02  Mg     2.4     11-02      acetaminophen   Oral Liquid .. 650 milliGRAM(s) Oral every 6 hours PRN  acetylcysteine 10%  Inhalation 4 milliLiter(s) Inhalation every 12 hours  albuterol/ipratropium for Nebulization 3 milliLiter(s) Nebulizer every 12 hours  amLODIPine   Tablet 10 milliGRAM(s) Oral daily  artificial tears (preservative free) Ophthalmic Solution 1 Drop(s) Right EYE every 4 hours  calcium acetate 667 milliGRAM(s) Oral three times a day with meals  doxazosin 8 milliGRAM(s) Oral at bedtime  erythromycin   Ointment 1 Application(s) Right EYE every 6 hours  heparin   Injectable 5000 Unit(s) SubCutaneous every 8 hours  ofloxacin 0.3% Solution 1 Drop(s) Right EYE every 6 hours  oxyCODONE    Solution 5 milliGRAM(s) Oral every 4 hours PRN  petrolatum Ophthalmic Ointment 1 Application(s) Both EYES two times a day  polyethylene glycol 3350 17 Gram(s) Oral two times a day  sodium zirconium cyclosilicate 5 Gram(s) Oral daily

## 2024-11-03 LAB
ANION GAP SERPL CALC-SCNC: 12 MMOL/L — SIGNIFICANT CHANGE UP (ref 5–17)
BUN SERPL-MCNC: 62 MG/DL — HIGH (ref 7–23)
CALCIUM SERPL-MCNC: 9.2 MG/DL — SIGNIFICANT CHANGE UP (ref 8.4–10.5)
CHLORIDE SERPL-SCNC: 109 MMOL/L — HIGH (ref 96–108)
CO2 SERPL-SCNC: 22 MMOL/L — SIGNIFICANT CHANGE UP (ref 22–31)
CREAT SERPL-MCNC: 2.59 MG/DL — HIGH (ref 0.5–1.3)
EGFR: 30 ML/MIN/1.73M2 — LOW
EGFR: 30 ML/MIN/1.73M2 — LOW
GLUCOSE SERPL-MCNC: 109 MG/DL — HIGH (ref 70–99)
GRAM STN FLD: ABNORMAL
HCT VFR BLD CALC: 38.9 % — LOW (ref 39–50)
HGB BLD-MCNC: 11.9 G/DL — LOW (ref 13–17)
LACTATE SERPL-SCNC: 0.9 MMOL/L — SIGNIFICANT CHANGE UP (ref 0.5–2)
MAGNESIUM SERPL-MCNC: 2.4 MG/DL — SIGNIFICANT CHANGE UP (ref 1.6–2.6)
MCHC RBC-ENTMCNC: 28.5 PG — SIGNIFICANT CHANGE UP (ref 27–34)
MCHC RBC-ENTMCNC: 30.6 G/DL — LOW (ref 32–36)
MCV RBC AUTO: 93.3 FL — SIGNIFICANT CHANGE UP (ref 80–100)
MRSA PCR RESULT.: NEGATIVE — SIGNIFICANT CHANGE UP
NRBC # BLD: 0 /100 WBCS — SIGNIFICANT CHANGE UP (ref 0–0)
NRBC BLD-RTO: 0 /100 WBCS — SIGNIFICANT CHANGE UP (ref 0–0)
PHOSPHATE SERPL-MCNC: 3.9 MG/DL — SIGNIFICANT CHANGE UP (ref 2.5–4.5)
PLATELET # BLD AUTO: 141 K/UL — LOW (ref 150–400)
POTASSIUM SERPL-MCNC: 4.8 MMOL/L — SIGNIFICANT CHANGE UP (ref 3.5–5.3)
POTASSIUM SERPL-SCNC: 4.8 MMOL/L — SIGNIFICANT CHANGE UP (ref 3.5–5.3)
PROCALCITONIN SERPL-MCNC: 0.66 NG/ML — HIGH (ref 0.02–0.1)
RAPID RVP RESULT: SIGNIFICANT CHANGE UP
RBC # BLD: 4.17 M/UL — LOW (ref 4.2–5.8)
RBC # FLD: 13.6 % — SIGNIFICANT CHANGE UP (ref 10.3–14.5)
S AUREUS DNA NOSE QL NAA+PROBE: POSITIVE
SARS-COV-2 RNA SPEC QL NAA+PROBE: SIGNIFICANT CHANGE UP
SODIUM SERPL-SCNC: 143 MMOL/L — SIGNIFICANT CHANGE UP (ref 135–145)
SPECIMEN SOURCE: SIGNIFICANT CHANGE UP
WBC # BLD: 10.98 K/UL — HIGH (ref 3.8–10.5)
WBC # FLD AUTO: 10.98 K/UL — HIGH (ref 3.8–10.5)

## 2024-11-03 PROCEDURE — 99233 SBSQ HOSP IP/OBS HIGH 50: CPT

## 2024-11-03 PROCEDURE — 71045 X-RAY EXAM CHEST 1 VIEW: CPT | Mod: 26

## 2024-11-03 PROCEDURE — 99231 SBSQ HOSP IP/OBS SF/LOW 25: CPT

## 2024-11-03 RX ORDER — VANCOMYCIN HCL IN 5 % DEXTROSE 1.5G/250ML
1000 PLASTIC BAG, INJECTION (ML) INTRAVENOUS EVERY 24 HOURS
Refills: 0 | Status: DISCONTINUED | OUTPATIENT
Start: 2024-11-03 | End: 2024-11-05

## 2024-11-03 RX ORDER — VANCOMYCIN HCL IN 5 % DEXTROSE 1.5G/250ML
1250 PLASTIC BAG, INJECTION (ML) INTRAVENOUS EVERY 24 HOURS
Refills: 0 | Status: DISCONTINUED | OUTPATIENT
Start: 2024-11-03 | End: 2024-11-03

## 2024-11-03 RX ORDER — ACETAMINOPHEN 500 MG/5ML
650 LIQUID (ML) ORAL EVERY 6 HOURS
Refills: 0 | Status: DISCONTINUED | OUTPATIENT
Start: 2024-11-03 | End: 2024-11-13

## 2024-11-03 RX ORDER — VANCOMYCIN HCL IN 5 % DEXTROSE 1.5G/250ML
PLASTIC BAG, INJECTION (ML) INTRAVENOUS
Refills: 0 | Status: DISCONTINUED | OUTPATIENT
Start: 2024-11-03 | End: 2024-11-03

## 2024-11-03 RX ORDER — PIPERACILLIN-TAZO-DEXTROSE,ISO 3.375G/5
4.5 IV SOLUTION, PIGGYBACK PREMIX FROZEN(ML) INTRAVENOUS ONCE
Refills: 0 | Status: COMPLETED | OUTPATIENT
Start: 2024-11-03 | End: 2024-11-03

## 2024-11-03 RX ORDER — PIPERACILLIN-TAZO-DEXTROSE,ISO 3.375G/5
4.5 IV SOLUTION, PIGGYBACK PREMIX FROZEN(ML) INTRAVENOUS EVERY 8 HOURS
Refills: 0 | Status: DISCONTINUED | OUTPATIENT
Start: 2024-11-04 | End: 2024-11-05

## 2024-11-03 RX ADMIN — ERYTHROMYCIN 1 APPLICATION(S): 5 OINTMENT OPHTHALMIC at 00:06

## 2024-11-03 RX ADMIN — Medication 667 MILLIGRAM(S): at 19:09

## 2024-11-03 RX ADMIN — IPRATROPIUM BROMIDE AND ALBUTEROL SULFATE 3 MILLILITER(S): .5; 2.5 SOLUTION RESPIRATORY (INHALATION) at 06:15

## 2024-11-03 RX ADMIN — HEPARIN SODIUM 5000 UNIT(S): 1000 INJECTION INTRAVENOUS; SUBCUTANEOUS at 21:44

## 2024-11-03 RX ADMIN — HEPARIN SODIUM 5000 UNIT(S): 1000 INJECTION INTRAVENOUS; SUBCUTANEOUS at 13:11

## 2024-11-03 RX ADMIN — Medication 1 DROP(S): at 17:40

## 2024-11-03 RX ADMIN — Medication 500 MILLILITER(S): at 08:35

## 2024-11-03 RX ADMIN — Medication 1 DROP(S): at 00:06

## 2024-11-03 RX ADMIN — Medication 667 MILLIGRAM(S): at 09:21

## 2024-11-03 RX ADMIN — Medication 1 DROP(S): at 05:59

## 2024-11-03 RX ADMIN — Medication 1 DROP(S): at 13:11

## 2024-11-03 RX ADMIN — HEPARIN SODIUM 5000 UNIT(S): 1000 INJECTION INTRAVENOUS; SUBCUTANEOUS at 06:11

## 2024-11-03 RX ADMIN — Medication 650 MILLIGRAM(S): at 15:59

## 2024-11-03 RX ADMIN — ERYTHROMYCIN 1 APPLICATION(S): 5 OINTMENT OPHTHALMIC at 06:16

## 2024-11-03 RX ADMIN — OFLOXACIN 1 DROP(S): 3 SOLUTION OPHTHALMIC at 06:16

## 2024-11-03 RX ADMIN — OFLOXACIN 1 DROP(S): 3 SOLUTION OPHTHALMIC at 19:00

## 2024-11-03 RX ADMIN — ACETYLCYSTEINE 4 MILLILITER(S): 200 INHALANT RESPIRATORY (INHALATION) at 22:13

## 2024-11-03 RX ADMIN — Medication 200 GRAM(S): at 16:49

## 2024-11-03 RX ADMIN — ERYTHROMYCIN 1 APPLICATION(S): 5 OINTMENT OPHTHALMIC at 11:10

## 2024-11-03 RX ADMIN — ACETYLCYSTEINE 4 MILLILITER(S): 200 INHALANT RESPIRATORY (INHALATION) at 10:22

## 2024-11-03 RX ADMIN — OFLOXACIN 1 DROP(S): 3 SOLUTION OPHTHALMIC at 11:10

## 2024-11-03 RX ADMIN — IPRATROPIUM BROMIDE AND ALBUTEROL SULFATE 3 MILLILITER(S): .5; 2.5 SOLUTION RESPIRATORY (INHALATION) at 18:44

## 2024-11-03 RX ADMIN — Medication 1 DROP(S): at 21:09

## 2024-11-03 RX ADMIN — Medication 1 APPLICATION(S): at 06:16

## 2024-11-03 RX ADMIN — Medication 650 MILLIGRAM(S): at 16:02

## 2024-11-03 RX ADMIN — Medication 25 GRAM(S): at 19:15

## 2024-11-03 RX ADMIN — ERYTHROMYCIN 1 APPLICATION(S): 5 OINTMENT OPHTHALMIC at 19:00

## 2024-11-03 RX ADMIN — AMLODIPINE BESYLATE 10 MILLIGRAM(S): 10 TABLET ORAL at 06:10

## 2024-11-03 RX ADMIN — OFLOXACIN 1 DROP(S): 3 SOLUTION OPHTHALMIC at 00:06

## 2024-11-03 RX ADMIN — Medication 250 MILLIGRAM(S): at 17:37

## 2024-11-03 RX ADMIN — Medication 1 APPLICATION(S): at 19:09

## 2024-11-03 RX ADMIN — SODIUM ZIRCONIUM CYCLOSILICATE 5 GRAM(S): 5 POWDER, FOR SUSPENSION ORAL at 11:12

## 2024-11-03 RX ADMIN — Medication 667 MILLIGRAM(S): at 13:11

## 2024-11-03 RX ADMIN — POLYETHYLENE GLYCOL 3350 17 GRAM(S): 17 POWDER, FOR SOLUTION ORAL at 06:16

## 2024-11-03 RX ADMIN — Medication 1 DROP(S): at 09:22

## 2024-11-03 RX ADMIN — DOXAZOSIN MESYLATE 8 MILLIGRAM(S): 8 TABLET ORAL at 21:44

## 2024-11-03 NOTE — PROGRESS NOTE ADULT - ASSESSMENT
44 YOF with unclear PMH (?stroke, MI) who was found down at work, intubated for airway protection and found to have acute parenchymal hemorrhage within nabil with mass effect (+ acute/subacute right cerebellar infarct) in setting of hypertensive emergency, transferred to [facility] for further neurosurgical care. Hospital course c/b poor neurologic recovery s/p trach-PEG, AUR s/p gabriel, ANIKA on CKD c/b hyperkalemia, HAP s/p amp-sulbactam (EOT 10/23).    #Pontine hemorrhage.   -Pt was found to have acute parenchymal hemorrhage within nabil with mass effect (+ acute/subacute right cerebellar infarct) in setting of hypertensive emergency. MRI brain also demonstrated acute/subacute R cerebellar stroke.   - Continue management as per NSG, neuro checks  -Will continue pain and bowel regimen as per NSGY     # Hypertensive emergency (resolved)  -Will continue to monitor BP   -Will continue amlodipine 10mg daily     # Acute kidney injury superimposed on CKD.   -Baseline unclear but suspect underlying CKD from uncontrolled HTN, US renal with chronic medical renal disease. Hospital course c/b ATN.   -Will continue to monitor creatinine   - Will continue to renally dose medications and  avoid nephrotoxins    #Hypernatremia ( resolved)  -Will continue to monitor Na and continue Free H20 at 250cc q6hrs     #Hyperkalemia (resolved)  -Pt's K is 3.9   -Will continue lokelma 5g qd for hyperkalemia     #Hyperphosphatemia ( resolved)   -Will continue  phoslo 776 mg TID for hyperphosphatemia    #Chronic respiratory failure.   -Pt s/p percutaneous trach by pulm on 10/14/24  -Will continue trach collar   -Will continue  routine trach care, frequent suctioning  - duo-neb with mucomyst q6hr PRN    # Neurogenic dysphagia.   -Pt s/p PEG with surgery 10/21/24  - TF per nutrition  - aspiration precautions and elevation of HOB.    #Acute urinary retention.   -Pt s/p Gabriel placement on 10/24 for urinary retention, failed TOV 10/27.   - Continue doxazosin 8mg.    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    #Tachycardia  #Leukocytosis   #Cough  Pt s/p EKG this morning which was sinus tach @117bpm; Will continue to monitor HR  -Pt with slight increase in WBC this morning 9.7->10.9 Will continue to monitor WBC and f/u CXR   -Will pan cx if spikes     #DVT prop  -Venodyne b/l 2/2 brain hemorrhage     #Dispo:   TBD; pt is pending PRUCOL application    55 minutes spent on total encounter. The necessity of the time spent during the encounter on this date of service was due to:    Review of hospital course, labs, vitals, medical records.  Bedside exam   Discussed plan of care with primary team   Documenting the encounter.

## 2024-11-03 NOTE — PROGRESS NOTE ADULT - SUBJECTIVE AND OBJECTIVE BOX
Patient was seen and examined at bedside. Case discuss with the primary team. Pt with tachycardia this morning as well as slight cough.     OBJECTIVE:  Vital Signs Last 24 Hrs  T(C): 36.8 (03 Nov 2024 08:32), Max: 37.7 (02 Nov 2024 20:37)  T(F): 98.3 (03 Nov 2024 08:32), Max: 99.9 (02 Nov 2024 20:37)  HR: 101 (03 Nov 2024 08:57) (89 - 128)  BP: 113/77 (03 Nov 2024 08:57) (112/78 - 168/92)  RR: 17 (03 Nov 2024 08:32) (16 - 19)  SpO2: 97% (03 Nov 2024 08:32) (95% - 99%)    Parameters below as of 03 Nov 2024 08:32  Patient On (Oxygen Delivery Method): room air  O2 Flow (L/min): 10  O2 Concentration (%): 35      PHYSICAL EXAM:  Gen: resting comfortably, NAD  HEENT: NCAT, MMM  Neck: trach with thick secretions   CV: RRR, no m/r/g, peripheral pulses 2+  Pulm: CTAB, no increased work of breathing, no rales/rhonchi  Abd: soft, ND, NT, PEG with abd binder  Skin: warm and dry  Ext: non-tender, no edema  Neuro: Eyes spontaneously open, does not follow commands  Psych: unable to assess                             11.9   10.98 )-----------( 141      ( 03 Nov 2024 05:30 )             38.9     143  |  109[H]  |  62[H]  ----------------------------<  109[H]  4.8   |  22  |  2.59[H]    Ca    9.2      03 Nov 2024 05:30  Phos  3.9     11-03  Mg     2.4     11-03      11/3 EKG : Sinus tach @117bpm     acetaminophen   Oral Liquid .. 650 milliGRAM(s) Oral every 6 hours PRN  acetylcysteine 10%  Inhalation 4 milliLiter(s) Inhalation every 12 hours  albuterol/ipratropium for Nebulization 3 milliLiter(s) Nebulizer every 12 hours  amLODIPine   Tablet 10 milliGRAM(s) Oral daily  artificial tears (preservative free) Ophthalmic Solution 1 Drop(s) Right EYE every 4 hours  calcium acetate 667 milliGRAM(s) Oral three times a day with meals  doxazosin 8 milliGRAM(s) Oral at bedtime  erythromycin   Ointment 1 Application(s) Right EYE every 6 hours  heparin   Injectable 5000 Unit(s) SubCutaneous every 8 hours  ofloxacin 0.3% Solution 1 Drop(s) Right EYE every 6 hours  oxyCODONE    Solution 5 milliGRAM(s) Oral every 4 hours PRN  petrolatum Ophthalmic Ointment 1 Application(s) Both EYES two times a day  polyethylene glycol 3350 17 Gram(s) Oral two times a day  sodium zirconium cyclosilicate 5 Gram(s) Oral daily

## 2024-11-03 NOTE — PROGRESS NOTE ADULT - SUBJECTIVE AND OBJECTIVE BOX
HPI:  Unknown age male, unknown past medical history (reported stroke and MI by coworkers) presented to [facility] with AMS, Pt was working at [employer] when he was found down by coworkers. EMS called and pt brought to [facility] ED. Intubated, sedated, started on cardene for SBPs in 200s. CT head showed brain stem bleed. Transferred to NSICU for further management.  (30 Sep 2024 12:55)      HOSPITAL COURSE:  9/30: transferred from [facility]. A line placed. Versed dc'd. Cy Rader at bedside, states pt has family in [place], cannot confirm medications or PMH other than stroke and MI. 250cc bolus 3% given. LR switched to NS. hydralazine 25q8 started, 3% started, switched propofol to precedex   10/1: stability CTH done. Added labetalol, started TF. Palliative consulted. ethics consulted to determine surrogate. febrile 103, pan cx sent  10/2: BD 2, GEORGE overnight. TF resumed. Desatt'd to 80s, FiO2 inc. to 50. Fentanyl given, ABG, CXR ordered. Maxxed on precedex, started on propofol for DARIEN -4 - -5. Precedex dc'd. Duonebs, mucomyst, hypertonic added. 3% dc'd. Cardene dc'd. Start vanc/CTX. Increased labetalol 200q8. MRSA negative, dc'd vanc. ETT pulled back 2cm x 2, good positioning after confirmatory chest xray. Ethics attempting to establish HCP with family. Na 159, starting FW 250q6 for range 150-155.   10/3: BD3, GEORGE o/n, neuro stable. Na elevating, FW increased to 300q6. Dc'd bowel reg for diarrhea. vEEG started. SQH 5000q8 tonight.   10/4: BD 4, albumn bolus, incr. LR to 80 2/2 incr. in Cr, LR to 100cc/hr for uptrending Cr. Started 7% hypersal for 48hrs and SL atropine for inline/oral thick secretions. Dc'd CTX and started ancef for MSSA in the sputum. Nephrology consulted for CKD, f/u recs. SBP 170s, given hydralazine 10mg IVP.   10/5: BD5, o/n 10mg IVP hydralazine given for SBP 170s and started on hydralazine 25q8 via OGT. 10mg IV push labetalol for SBP > 160s. RT placed for diarrhea.   10/6: BD6, o/n FW increased to 350q4 per nephrology recs. IV tylenol for temp 100.6, SBp 160s presumed uncomfortable.   10/7: BD7, overnight pancultured for temp 101.8F.   10/8: BD8. GEORGE. Cr bumped. decreased LR to 75cc/hr. Adding simethicone ATC. incr hydralazine 50mgTID. Incr labetalol 300mgTID. Na 145, decreased FWF to 250q6. Start precedex. FENa consistent with intrinsic kidney injury. Pend repeat renal US. Retaining up to 1.3L, bladder scans q6, straight cath PRN  10/9: BD 9. GEORGE overnight. Neuro stable. abd xray for distention w non-specific gas pattern, OGT to LIWS for morning. duonebs/mucomyst to q8 for improving secretions. Changed tube feeds to Jevity 1.5 20cc/hr, low rate due to abdominal distention, nepro dense and more difficult to digest. Tolerating CPAP, confirmed by ABG.   10/10: BD 10. GEORGE overnight. Neuro stable. (+) gabriel for urinary retention on bladder scan. inc TF to goal rate of 40cc/hr. family leaning toward pursuing trach/PEG. 1/2 amp for FS 81.   10/11: BD 11. GEORGE overnight. Neuro stable. Trach/PEG consults placed.   10/12: BD 12. GEORGE overnight. Neuro stable. MRI brain complete.   10/13: BD 13. Increase flomax. Hold SQH after PM dose for trach tm. IVL.   10/14: BD 14. GEORGE overnight, remains on AC/VC. Gabriel placed for urinary retention. Dc'd free water.  S/p trach with pulm. NGT placed and CXR confirmed in good position.   10/15: BD 15, GEORGE ovn. resumed feeds. spiked 101, pan cx sent.   10/16: BD 16. GEORGE ovn. Lokelma 5mg for K+ 5.4. Started vanc q 24/zosyn for empiric PNA coverage, IVF to 100/hr. PEG held for fever.   10/17: BD 17,  ordered serum osm and urine osm for am. Started sinemet for neurostimulation. Increased cardura to 0.8. Started FW 100q4, dc'd IVF. MRSA negative, dc'd vanc. NGT replaced d/t coiling.   10/18: BD 18, GEORGE overnight, neuro stable. Amantadine added for neurostim. zosyn changed to unasyn for acinetobacter baumannii, failed TOV and required SC  10/19: BD 19, GEORGE ovn. cardura 2mg added for retention. labetalol decreased 200q8, hydralazine decreased 25q8. Gabriel replaced.   10/20: BD20, GEORGE overnight. NGT dislodged, replaced. PEG tomorrow w/ gen surg, FW increased to 150q4 and labetalol decreased to 100q8, lokelma given for hyperkalemia.   10/21: BD 21. POD0 PEG placement with Gen surg. decr labetolol to 50q8, incr. cardura to 0.4, started lokelma and phoslo, dc gabriel POD0 PEG placement with Gen surg.  10/22: BD 22. Plan to start TF today via PEG. dc labetalol, Following ophtho recs. Increased apnea settings - found to be in cheyne-moe respiration. CPAP 5/5.  10/23: BD 23. hydralazine d/c'd, trach collar trial today. Rectal tube placed at 6am.  10/24: BD24, o/n lokelma held due to diarrhea. Free water 100q6 resumed. dc'd tamsulosin, amantadine. Incr'd cardura to 8mg qhs. Dc'd FW. Switched jevity to nepro. gabriel placed for high urine output. Started SL atropine for oral secretions. Dc'd free water.  10/25: BD25, o/n decreased suctioning requirements to > q4hrs, GEORGE. Cr improving, cont phoslo, lokelma held at this time. Gabriel placed yest, cont. Tolerating trach collar. Given 500cc plasmalyte bolus for ANIKA. Dc'd sinemet.   10/26: BD26, o/n resumed lokelma 5mg daily and resumed 100cc free water q6hrs. Change in neuro status with new right pupillary dilation with anisocoria (right pupil 6mm fixed and left pupil 3mm briskly reactive). Given 23.4% NaCl bullet, taken for emergent CTH showing mostly resolved pontine hemorrhage, continued brainstem hypodensity likely edema d/t hemorrhage, no new hemorrhage or infarct, no herniation, mild increase in size of left lateral ventricle. Vitals remaine stable. Na goal > 140.   10/27: BD27, o/n GEORGE.Neuro stable. Pend stepdown with airway bed.   10/28: BD 28. GEORGE overnight. Neuro stable. Miralax ordered. Gabriel removed, pending TOV.  10/29: BD 29. GEORGE o/n. Given 2L NS over 8 hrs for increased BUN/Cr ratio. Gabriel placed for frequent straight cath.   10/30: BD 30.   10/31: BD 31. GEORGE overnight. Na 149, increased free water to 200q6. 1L NS for uptrending BUN.   11/1: BD 32. GEORGE overnight. Given 1L NS for dehydration. Na 146, increased FW to 250q6.   11/2: BD 33 GEORGE overnight, neuro stable, given 500cc bolus for net negative status and tachycardia   11/3: BD 34, GEORGE overnight, neuro stable     OVERNIGHT EVENTS: Pt seen and examined in bed on 9U, unable to offer any acute complaints at this time     Vital Signs Last 24 Hrs  T(C): 37.3 (03 Nov 2024 00:12), Max: 37.7 (02 Nov 2024 20:37)  T(F): 99.1 (03 Nov 2024 00:12), Max: 99.9 (02 Nov 2024 20:37)  HR: 116 (03 Nov 2024 00:12) (88 - 128)  BP: 112/78 (03 Nov 2024 00:12) (112/78 - 168/92)  BP(mean): --  RR: 16 (03 Nov 2024 00:12) (16 - 19)  SpO2: 95% (03 Nov 2024 00:12) (95% - 100%)    Parameters below as of 03 Nov 2024 00:12  Patient On (Oxygen Delivery Method): tracheostomy collar  O2 Flow (L/min): 10  O2 Concentration (%): 35    I&O's Summary    01 Nov 2024 07:01  -  02 Nov 2024 07:00  --------------------------------------------------------  IN: 790 mL / OUT: 2000 mL / NET: -1210 mL    02 Nov 2024 08:01  -  03 Nov 2024 01:31  --------------------------------------------------------  IN: 1590 mL / OUT: 1700 mL / NET: -110 mL        PHYSICAL EXAM:  Constitutional: Pt found in bed in NAD   ENT: PERRL, vertically tracking   Respiratory: +trach, breathing non-labored, symmetrical chest wall movement  Cardiovascuar: RRR, no murmurs  Gastrointestinal: +PEG, abdomen soft, non tender  Neurological:  AAOX0, Not following commands   Motor: LUE 0/5, RUE spontaneous within plane of bed 2/5, LLE withdraws, RLE withdraws   Sensation: unable to assess   Pronator Drift: unable to assess   Wounds/Drains: N/A    TUBES/LINES:  [x] Gabriel  [] Lumbar Drain  [] Wound Drains  [] Others      DIET:  [] NPO  [] Mechanical  [x] Tube feeds - via PEG    LABS:                        12.3   9.73  )-----------( 182      ( 02 Nov 2024 05:30 )             40.4     11-02    147[H]  |  112[H]  |  62[H]  ----------------------------<  100[H]  4.8   |  23  |  2.32[H]    Ca    9.2      02 Nov 2024 05:30  Phos  3.7     11-02  Mg     2.4     11-02        Urinalysis Basic - ( 02 Nov 2024 05:30 )    Color: x / Appearance: x / SG: x / pH: x  Gluc: 100 mg/dL / Ketone: x  / Bili: x / Urobili: x   Blood: x / Protein: x / Nitrite: x   Leuk Esterase: x / RBC: x / WBC x   Sq Epi: x / Non Sq Epi: x / Bacteria: x          CAPILLARY BLOOD GLUCOSE          Drug Levels: [] N/A    CSF Analysis: [] N/A      Allergies    Allergy Status Unknown    Intolerances      MEDICATIONS:  Antibiotics:    Neuro:  acetaminophen   Oral Liquid .. 650 milliGRAM(s) Oral every 6 hours PRN  oxyCODONE    Solution 5 milliGRAM(s) Oral every 4 hours PRN    Anticoagulation:  heparin   Injectable 5000 Unit(s) SubCutaneous every 8 hours    OTHER:  acetylcysteine 10%  Inhalation 4 milliLiter(s) Inhalation every 12 hours  albuterol/ipratropium for Nebulization 3 milliLiter(s) Nebulizer every 12 hours  amLODIPine   Tablet 10 milliGRAM(s) Oral daily  artificial tears (preservative free) Ophthalmic Solution 1 Drop(s) Right EYE every 4 hours  doxazosin 8 milliGRAM(s) Oral at bedtime  erythromycin   Ointment 1 Application(s) Right EYE every 6 hours  ofloxacin 0.3% Solution 1 Drop(s) Right EYE every 6 hours  petrolatum Ophthalmic Ointment 1 Application(s) Both EYES two times a day  polyethylene glycol 3350 17 Gram(s) Oral two times a day  sodium zirconium cyclosilicate 5 Gram(s) Oral daily    IVF:  calcium acetate 667 milliGRAM(s) Oral three times a day with meals    CULTURES:    RADIOLOGY & ADDITIONAL TESTS:      ASSESSMENT:  43 y/o PMH ?stroke/MI present to [facility] after collapsing at work. Decorticate posturing, vomiting, intubated for airway protection. 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 Transferred to [facility] for further management. s/p trach 10/14. s/p peg 10/21.      PLAN:  Neuro:  - Neuro/vitals q8hr   - pain control: Tylenol prn, oxy prn  - CTH 9/30: enlarged pontine hemorrhage, CTH 10/3: read stable, CTH 10/25 anisocoric pupils (R sluggish 6mm compared to left 3mm briskly reactive); showing mostly resolved pontine hemorrhage, continued brainstem hypodensity likely edema d/t hemorrhage, no new hemorrhage or infarct, no herniation, mild increase in size of left lateral ventricle  - vEEG (10/3-4)- negative, (10/17-10/19) - negative  - stroke core measures, stroke neuro signed off  - MRI brain w/ w/o contrast 10/12: parenchymal hemorrhage, acute/subacute R cerebellar stroke      CV:  - -160  - HTN: amlodipine 10 mg qd  - echo (9/30) EF 75%    Resp:  - tolerating trach collar 35%  - duonebs/mucomyst q12h    GI:  - Nepro TF via PEG (placed 10/21 by gen surg)  - bowel regimen, last BM 10/31    Renal:  - gabriel placed 10/29 d/t urinary retention   - FW flushes 250cc q6hrs   - hyperK 10/20: lokelma 5mg qd  - hyperPhos: phoslo 667mg TID   - Urinary retenion: Cardura 8 mg   - CKD: trend BUN/Cr  - Na goal 135-145  - renal US 10/1: echogenicity c/w chronic med renal dz, repeat 10/8: inc renal echogeicity, c/f medical renal disease w increased hydro     Endo:  - A1c 5.4    Heme:  - DVT ppx: SCDs, SQH 5000u q8h     ID:  - afebrile  - 10/15 sputum +actinobacter baumanii, s/p unasyn (10/18-10/23)  - MRSA swab negative (10/2), sputum MSSA + , s/p 1 dose vanc (10/2), CTX (10/2 - 10/4), Ancef (10/4-10/14)     MISC:  - ophtho consult for keratitis, rec erythromycin ointment to rt eye q4hrs, ofloxacin ointment to rt eye QID, artificial tears to rt eye q2hrs, moisture chamber at bedtime    Dispo: SD status, full code, pending placement and emergency medicaid     D/w Dr. D'Amico

## 2024-11-04 DIAGNOSIS — A41.9 SEPSIS, UNSPECIFIED ORGANISM: ICD-10-CM

## 2024-11-04 LAB
ANION GAP SERPL CALC-SCNC: 12 MMOL/L — SIGNIFICANT CHANGE UP (ref 5–17)
BASOPHILS # BLD AUTO: 0.07 K/UL — SIGNIFICANT CHANGE UP (ref 0–0.2)
BASOPHILS NFR BLD AUTO: 0.8 % — SIGNIFICANT CHANGE UP (ref 0–2)
BUN SERPL-MCNC: 65 MG/DL — HIGH (ref 7–23)
CALCIUM SERPL-MCNC: 9 MG/DL — SIGNIFICANT CHANGE UP (ref 8.4–10.5)
CHLORIDE SERPL-SCNC: 110 MMOL/L — HIGH (ref 96–108)
CO2 SERPL-SCNC: 23 MMOL/L — SIGNIFICANT CHANGE UP (ref 22–31)
CREAT SERPL-MCNC: 2.87 MG/DL — HIGH (ref 0.5–1.3)
EGFR: 27 ML/MIN/1.73M2 — LOW
EGFR: 27 ML/MIN/1.73M2 — LOW
EOSINOPHIL # BLD AUTO: 0.1 K/UL — SIGNIFICANT CHANGE UP (ref 0–0.5)
EOSINOPHIL NFR BLD AUTO: 1.2 % — SIGNIFICANT CHANGE UP (ref 0–6)
GLUCOSE SERPL-MCNC: 110 MG/DL — HIGH (ref 70–99)
HCT VFR BLD CALC: 37.4 % — LOW (ref 39–50)
HGB BLD-MCNC: 11.8 G/DL — LOW (ref 13–17)
IMM GRANULOCYTES NFR BLD AUTO: 0.2 % — SIGNIFICANT CHANGE UP (ref 0–0.9)
LYMPHOCYTES # BLD AUTO: 0.83 K/UL — LOW (ref 1–3.3)
LYMPHOCYTES # BLD AUTO: 9.9 % — LOW (ref 13–44)
MAGNESIUM SERPL-MCNC: 2.7 MG/DL — HIGH (ref 1.6–2.6)
MCHC RBC-ENTMCNC: 29.1 PG — SIGNIFICANT CHANGE UP (ref 27–34)
MCHC RBC-ENTMCNC: 31.6 G/DL — LOW (ref 32–36)
MCV RBC AUTO: 92.3 FL — SIGNIFICANT CHANGE UP (ref 80–100)
MONOCYTES # BLD AUTO: 0.77 K/UL — SIGNIFICANT CHANGE UP (ref 0–0.9)
MONOCYTES NFR BLD AUTO: 9.2 % — SIGNIFICANT CHANGE UP (ref 2–14)
NEUTROPHILS # BLD AUTO: 6.58 K/UL — SIGNIFICANT CHANGE UP (ref 1.8–7.4)
NEUTROPHILS NFR BLD AUTO: 78.7 % — HIGH (ref 43–77)
NRBC # BLD: 0 /100 WBCS — SIGNIFICANT CHANGE UP (ref 0–0)
NRBC BLD-RTO: 0 /100 WBCS — SIGNIFICANT CHANGE UP (ref 0–0)
PHOSPHATE SERPL-MCNC: 5 MG/DL — HIGH (ref 2.5–4.5)
PLATELET # BLD AUTO: 164 K/UL — SIGNIFICANT CHANGE UP (ref 150–400)
POTASSIUM SERPL-MCNC: 4.8 MMOL/L — SIGNIFICANT CHANGE UP (ref 3.5–5.3)
POTASSIUM SERPL-SCNC: 4.8 MMOL/L — SIGNIFICANT CHANGE UP (ref 3.5–5.3)
PROCALCITONIN SERPL-MCNC: 0.86 NG/ML — HIGH (ref 0.02–0.1)
RBC # BLD: 4.05 M/UL — LOW (ref 4.2–5.8)
RBC # FLD: 13.7 % — SIGNIFICANT CHANGE UP (ref 10.3–14.5)
SODIUM SERPL-SCNC: 145 MMOL/L — SIGNIFICANT CHANGE UP (ref 135–145)
VANCOMYCIN FLD-MCNC: 9.6 UG/ML — SIGNIFICANT CHANGE UP
WBC # BLD: 8.37 K/UL — SIGNIFICANT CHANGE UP (ref 3.8–10.5)
WBC # FLD AUTO: 8.37 K/UL — SIGNIFICANT CHANGE UP (ref 3.8–10.5)

## 2024-11-04 PROCEDURE — 99233 SBSQ HOSP IP/OBS HIGH 50: CPT

## 2024-11-04 PROCEDURE — 99231 SBSQ HOSP IP/OBS SF/LOW 25: CPT

## 2024-11-04 RX ADMIN — OFLOXACIN 1 DROP(S): 3 SOLUTION OPHTHALMIC at 06:53

## 2024-11-04 RX ADMIN — POLYETHYLENE GLYCOL 3350 17 GRAM(S): 17 POWDER, FOR SOLUTION ORAL at 06:50

## 2024-11-04 RX ADMIN — Medication 1 DROP(S): at 17:18

## 2024-11-04 RX ADMIN — ACETYLCYSTEINE 4 MILLILITER(S): 200 INHALANT RESPIRATORY (INHALATION) at 10:37

## 2024-11-04 RX ADMIN — DOXAZOSIN MESYLATE 8 MILLIGRAM(S): 8 TABLET ORAL at 21:29

## 2024-11-04 RX ADMIN — ERYTHROMYCIN 1 APPLICATION(S): 5 OINTMENT OPHTHALMIC at 06:51

## 2024-11-04 RX ADMIN — HEPARIN SODIUM 5000 UNIT(S): 1000 INJECTION INTRAVENOUS; SUBCUTANEOUS at 14:13

## 2024-11-04 RX ADMIN — Medication 25 GRAM(S): at 22:27

## 2024-11-04 RX ADMIN — Medication 650 MILLIGRAM(S): at 19:17

## 2024-11-04 RX ADMIN — ERYTHROMYCIN 1 APPLICATION(S): 5 OINTMENT OPHTHALMIC at 11:14

## 2024-11-04 RX ADMIN — SODIUM ZIRCONIUM CYCLOSILICATE 5 GRAM(S): 5 POWDER, FOR SUSPENSION ORAL at 11:13

## 2024-11-04 RX ADMIN — OFLOXACIN 1 DROP(S): 3 SOLUTION OPHTHALMIC at 23:02

## 2024-11-04 RX ADMIN — OFLOXACIN 1 DROP(S): 3 SOLUTION OPHTHALMIC at 11:15

## 2024-11-04 RX ADMIN — Medication 250 MILLIGRAM(S): at 18:19

## 2024-11-04 RX ADMIN — Medication 650 MILLIGRAM(S): at 18:19

## 2024-11-04 RX ADMIN — OFLOXACIN 1 DROP(S): 3 SOLUTION OPHTHALMIC at 17:19

## 2024-11-04 RX ADMIN — HEPARIN SODIUM 5000 UNIT(S): 1000 INJECTION INTRAVENOUS; SUBCUTANEOUS at 06:50

## 2024-11-04 RX ADMIN — Medication 1 DROP(S): at 01:09

## 2024-11-04 RX ADMIN — Medication 1 APPLICATION(S): at 17:21

## 2024-11-04 RX ADMIN — Medication 25 GRAM(S): at 06:50

## 2024-11-04 RX ADMIN — Medication 1 DROP(S): at 13:16

## 2024-11-04 RX ADMIN — IPRATROPIUM BROMIDE AND ALBUTEROL SULFATE 3 MILLILITER(S): .5; 2.5 SOLUTION RESPIRATORY (INHALATION) at 17:18

## 2024-11-04 RX ADMIN — Medication 667 MILLIGRAM(S): at 13:17

## 2024-11-04 RX ADMIN — Medication 1 DROP(S): at 09:11

## 2024-11-04 RX ADMIN — IPRATROPIUM BROMIDE AND ALBUTEROL SULFATE 3 MILLILITER(S): .5; 2.5 SOLUTION RESPIRATORY (INHALATION) at 06:50

## 2024-11-04 RX ADMIN — ERYTHROMYCIN 1 APPLICATION(S): 5 OINTMENT OPHTHALMIC at 23:02

## 2024-11-04 RX ADMIN — ERYTHROMYCIN 1 APPLICATION(S): 5 OINTMENT OPHTHALMIC at 00:18

## 2024-11-04 RX ADMIN — Medication 25 GRAM(S): at 14:12

## 2024-11-04 RX ADMIN — HEPARIN SODIUM 5000 UNIT(S): 1000 INJECTION INTRAVENOUS; SUBCUTANEOUS at 22:32

## 2024-11-04 RX ADMIN — Medication 1 DROP(S): at 04:48

## 2024-11-04 RX ADMIN — AMLODIPINE BESYLATE 10 MILLIGRAM(S): 10 TABLET ORAL at 06:50

## 2024-11-04 RX ADMIN — Medication 1000 MILLILITER(S): at 13:16

## 2024-11-04 RX ADMIN — ACETYLCYSTEINE 4 MILLILITER(S): 200 INHALANT RESPIRATORY (INHALATION) at 22:34

## 2024-11-04 RX ADMIN — Medication 667 MILLIGRAM(S): at 17:24

## 2024-11-04 RX ADMIN — Medication 667 MILLIGRAM(S): at 09:10

## 2024-11-04 RX ADMIN — OFLOXACIN 1 DROP(S): 3 SOLUTION OPHTHALMIC at 00:18

## 2024-11-04 RX ADMIN — Medication 1 DROP(S): at 21:16

## 2024-11-04 RX ADMIN — POLYETHYLENE GLYCOL 3350 17 GRAM(S): 17 POWDER, FOR SOLUTION ORAL at 17:24

## 2024-11-04 RX ADMIN — ERYTHROMYCIN 1 APPLICATION(S): 5 OINTMENT OPHTHALMIC at 17:18

## 2024-11-04 RX ADMIN — Medication 1 APPLICATION(S): at 06:51

## 2024-11-04 NOTE — PROGRESS NOTE ADULT - ASSESSMENT
44 YOF with unclear PMH (?stroke, MI) who was found down at work, intubated for airway protection and found to have acute parenchymal hemorrhage within nabil with mass effect (+ acute/subacute right cerebellar infarct) in setting of hypertensive emergency, transferred to [facility] for further neurosurgical care. Hospital course c/b poor neurologic recovery s/p trach-PEG, AUR s/p gabriel, ANIKA on CKD c/b hyperkalemia, HAP s/p amp-sulbactam (EOT 10/23). 20.2

## 2024-11-04 NOTE — PROGRESS NOTE ADULT - SUBJECTIVE AND OBJECTIVE BOX
HPI:  Unknown age male, unknown past medical history (reported stroke and MI by coworkers) presented to [facility] with AMS, Pt was working at [employer] when he was found down by coworkers. EMS called and pt brought to [facility] ED. Intubated, sedated, started on cardene for SBPs in 200s. CT head showed brain stem bleed. Transferred to NSICU for further management.  (30 Sep 2024 12:55)      HOSPITAL COURSE:  9/30: transferred from [facility]. A line placed. Versed dc'd. Cy Rader at bedside, states pt has family in [place], cannot confirm medications or PMH other than stroke and MI. 250cc bolus 3% given. LR switched to NS. hydralazine 25q8 started, 3% started, switched propofol to precedex   10/1: stability CTH done. Added labetalol, started TF. Palliative consulted. ethics consulted to determine surrogate. febrile 103, pan cx sent  10/2: BD 2, GEORGE overnight. TF resumed. Desatt'd to 80s, FiO2 inc. to 50. Fentanyl given, ABG, CXR ordered. Maxxed on precedex, started on propofol for DARIEN -4 - -5. Precedex dc'd. Duonebs, mucomyst, hypertonic added. 3% dc'd. Cardene dc'd. Start vanc/CTX. Increased labetalol 200q8. MRSA negative, dc'd vanc. ETT pulled back 2cm x 2, good positioning after confirmatory chest xray. Ethics attempting to establish HCP with family. Na 159, starting FW 250q6 for range 150-155.   10/3: BD3, GEORGE o/n, neuro stable. Na elevating, FW increased to 300q6. Dc'd bowel reg for diarrhea. vEEG started. SQH 5000q8 tonight.   10/4: BD 4, albumn bolus, incr. LR to 80 2/2 incr. in Cr, LR to 100cc/hr for uptrending Cr. Started 7% hypersal for 48hrs and SL atropine for inline/oral thick secretions. Dc'd CTX and started ancef for MSSA in the sputum. Nephrology consulted for CKD, f/u recs. SBP 170s, given hydralazine 10mg IVP.   10/5: BD5, o/n 10mg IVP hydralazine given for SBP 170s and started on hydralazine 25q8 via OGT. 10mg IV push labetalol for SBP > 160s. RT placed for diarrhea.   10/6: BD6, o/n FW increased to 350q4 per nephrology recs. IV tylenol for temp 100.6, SBp 160s presumed uncomfortable.   10/7: BD7, overnight pancultured for temp 101.8F.   10/8: BD8. GEORGE. Cr bumped. decreased LR to 75cc/hr. Adding simethicone ATC. incr hydralazine 50mgTID. Incr labetalol 300mgTID. Na 145, decreased FWF to 250q6. Start precedex. FENa consistent with intrinsic kidney injury. Pend repeat renal US. Retaining up to 1.3L, bladder scans q6, straight cath PRN  10/9: BD 9. GEORGE overnight. Neuro stable. abd xray for distention w non-specific gas pattern, OGT to LIWS for morning. duonebs/mucomyst to q8 for improving secretions. Changed tube feeds to Jevity 1.5 20cc/hr, low rate due to abdominal distention, nepro dense and more difficult to digest. Tolerating CPAP, confirmed by ABG.   10/10: BD 10. GEORGE overnight. Neuro stable. (+) gabriel for urinary retention on bladder scan. inc TF to goal rate of 40cc/hr. family leaning toward pursuing trach/PEG. 1/2 amp for FS 81.   10/11: BD 11. GEORGE overnight. Neuro stable. Trach/PEG consults placed.   10/12: BD 12. GEORGE overnight. Neuro stable. MRI brain complete.   10/13: BD 13. Increase flomax. Hold SQH after PM dose for trach tm. IVL.   10/14: BD 14. GEORGE overnight, remains on AC/VC. Gabriel placed for urinary retention. Dc'd free water.  S/p trach with pulm. NGT placed and CXR confirmed in good position.   10/15: BD 15, GEORGE ovn. resumed feeds. spiked 101, pan cx sent.   10/16: BD 16. GEORGE ovn. Lokelma 5mg for K+ 5.4. Started vanc q 24/zosyn for empiric PNA coverage, IVF to 100/hr. PEG held for fever.   10/17: BD 17,  ordered serum osm and urine osm for am. Started sinemet for neurostimulation. Increased cardura to 0.8. Started FW 100q4, dc'd IVF. MRSA negative, dc'd vanc. NGT replaced d/t coiling.   10/18: BD 18, GEORGE overnight, neuro stable. Amantadine added for neurostim. zosyn changed to unasyn for acinetobacter baumannii, failed TOV and required SC  10/19: BD 19, GEORGE ovn. cardura 2mg added for retention. labetalol decreased 200q8, hydralazine decreased 25q8. Gabriel replaced.   10/20: BD20, GEORGE overnight. NGT dislodged, replaced. PEG tomorrow w/ gen surg, FW increased to 150q4 and labetalol decreased to 100q8, lokelma given for hyperkalemia.   10/21: BD 21. POD0 PEG placement with Gen surg. decr labetolol to 50q8, incr. cardura to 0.4, started lokelma and phoslo, dc gabriel POD0 PEG placement with Gen surg.  10/22: BD 22. Plan to start TF today via PEG. dc labetalol, Following ophtho recs. Increased apnea settings - found to be in cheyne-moe respiration. CPAP 5/5.  10/23: BD 23. hydralazine d/c'd, trach collar trial today. Rectal tube placed at 6am.  10/24: BD24, o/n lokelma held due to diarrhea. Free water 100q6 resumed. dc'd tamsulosin, amantadine. Incr'd cardura to 8mg qhs. Dc'd FW. Switched jevity to nepro. gabriel placed for high urine output. Started SL atropine for oral secretions. Dc'd free water.  10/25: BD25, o/n decreased suctioning requirements to > q4hrs, GEORGE. Cr improving, cont phoslo, lokelma held at this time. Gabriel placed yest, cont. Tolerating trach collar. Given 500cc plasmalyte bolus for ANIKA. Dc'd sinemet.   10/26: BD26, o/n resumed lokelma 5mg daily and resumed 100cc free water q6hrs. Change in neuro status with new right pupillary dilation with anisocoria (right pupil 6mm fixed and left pupil 3mm briskly reactive). Given 23.4% NaCl bullet, taken for emergent CTH showing mostly resolved pontine hemorrhage, continued brainstem hypodensity likely edema d/t hemorrhage, no new hemorrhage or infarct, no herniation, mild increase in size of left lateral ventricle. Vitals remaine stable. Na goal > 140.   10/27: BD27, o/n GEORGE.Neuro stable. Pend stepdown with airway bed.   10/28: BD 28. GEORGE overnight. Neuro stable. Miralax ordered. Gabriel removed, pending TOV.  10/29: BD 29. GEORGE o/n. Given 2L NS over 8 hrs for increased BUN/Cr ratio. Gabriel placed for frequent straight cath.   10/30: BD 30.   10/31: BD 31. GEORGE overnight. Na 149, increased free water to 200q6. 1L NS for uptrending BUN.   11/1: BD 32. GEORGE overnight. Given 1L NS for dehydration. Na 146, increased FW to 250q6.   11/2: BD 33 GEORGE overnight, neuro stable, given 500cc bolus for net negative status and tachycardia   11/3: BD 34, GEORGE overnight, neuro stable. Patient remains tachycardic, EKG showing sinus tachycardia, given additional 500cc NS bolus. Febrile to 101.9F, pan cultured (without UA), CXR WNL, given tylenol.   11/4: BD 35, GEORGE overnight, neuro stable     OVERNIGHT EVENTS: Pt seen and examined in bed on 9U, unable to offer any acute complaints at this time.     Vital Signs Last 24 Hrs  T(C): 36.9 (04 Nov 2024 00:03), Max: 38.8 (03 Nov 2024 16:00)  T(F): 98.5 (04 Nov 2024 00:03), Max: 101.9 (03 Nov 2024 16:00)  HR: 102 (04 Nov 2024 00:03) (100 - 113)  BP: 118/76 (04 Nov 2024 00:03) (113/77 - 130/90)  BP(mean): --  RR: 17 (04 Nov 2024 00:03) (16 - 18)  SpO2: 93% (04 Nov 2024 00:03) (93% - 97%)    Parameters below as of 04 Nov 2024 00:03  Patient On (Oxygen Delivery Method): tracheostomy collar        I&O's Summary    02 Nov 2024 08:01  -  03 Nov 2024 07:00  --------------------------------------------------------  IN: 2140 mL / OUT: 1700 mL / NET: 440 mL    03 Nov 2024 07:01  -  04 Nov 2024 01:08  --------------------------------------------------------  IN: 2000 mL / OUT: 1100 mL / NET: 900 mL        PHYSICAL EXAM:  Constitutional: Pt found in bed in NAD   ENT: PERRL, vertical EOMi, vertically tracking   Respiratory: +trach, breathing non-labored, symmetrical chest wall movement  Cardiovascuar: RRR, no murmurs  Gastrointestinal: +PEG, abdomen soft, non tender  Neurological:  A&Ox0, nonverbal, intermittently following commands by squeezing hand   LUE 0/5, LLE withdraws from noxious, RUE unable to lift off bed HG 4/5 spontaneous movement, RLE withdraws from noxious   Pronator Drift: unable to assess   Wounds/Drains: N/A    TUBES/LINES:  [x] Garbiel  [] Lumbar Drain  [] Wound Drains  [] Others      DIET:  [] NPO  [] Mechanical  [x] Tube feeds - via PEG     LABS:                        11.9   10.98 )-----------( 141      ( 03 Nov 2024 05:30 )             38.9     11-03    143  |  109[H]  |  62[H]  ----------------------------<  109[H]  4.8   |  22  |  2.59[H]    Ca    9.2      03 Nov 2024 05:30  Phos  3.9     11-03  Mg     2.4     11-03        Urinalysis Basic - ( 03 Nov 2024 05:30 )    Color: x / Appearance: x / SG: x / pH: x  Gluc: 109 mg/dL / Ketone: x  / Bili: x / Urobili: x   Blood: x / Protein: x / Nitrite: x   Leuk Esterase: x / RBC: x / WBC x   Sq Epi: x / Non Sq Epi: x / Bacteria: x          CAPILLARY BLOOD GLUCOSE          Drug Levels: [] N/A    CSF Analysis: [] N/A      Allergies    Allergy Status Unknown    Intolerances      MEDICATIONS:  Antibiotics:  piperacillin/tazobactam IVPB.. 4.5 Gram(s) IV Intermittent every 8 hours  vancomycin  IVPB 1000 milliGRAM(s) IV Intermittent every 24 hours    Neuro:  acetaminophen   Oral Liquid .. 650 milliGRAM(s) Oral every 6 hours PRN  oxyCODONE    Solution 5 milliGRAM(s) Oral every 4 hours PRN    Anticoagulation:  heparin   Injectable 5000 Unit(s) SubCutaneous every 8 hours    OTHER:  acetylcysteine 10%  Inhalation 4 milliLiter(s) Inhalation every 12 hours  albuterol/ipratropium for Nebulization 3 milliLiter(s) Nebulizer every 12 hours  amLODIPine   Tablet 10 milliGRAM(s) Oral daily  artificial tears (preservative free) Ophthalmic Solution 1 Drop(s) Right EYE every 4 hours  doxazosin 8 milliGRAM(s) Oral at bedtime  erythromycin   Ointment 1 Application(s) Right EYE every 6 hours  ofloxacin 0.3% Solution 1 Drop(s) Right EYE every 6 hours  petrolatum Ophthalmic Ointment 1 Application(s) Both EYES two times a day  polyethylene glycol 3350 17 Gram(s) Oral two times a day  sodium zirconium cyclosilicate 5 Gram(s) Oral daily    IVF:  calcium acetate 667 milliGRAM(s) Oral three times a day with meals    CULTURES:    RADIOLOGY & ADDITIONAL TESTS:      ASSESSMENT:  43 y/o PMH ?stroke/MI present to [facility] after collapsing at work. Decorticate posturing, vomiting, intubated for airway protection. 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 Transferred to [facility] for further management. s/p trach 10/14. s/p peg 10/21.      PLAN:  Neuro:  - Neuro/vitals q8hr   - pain control: Tylenol prn, oxy prn  - CTH 9/30: enlarged pontine hemorrhage, CTH 10/3: read stable, CTH 10/25 anisocoric pupils (R sluggish 6mm compared to left 3mm briskly reactive); showing mostly resolved pontine hemorrhage, continued brainstem hypodensity likely edema d/t hemorrhage, no new hemorrhage or infarct, no herniation, mild increase in size of left lateral ventricle  - vEEG (10/3-4)- negative, (10/17-10/19) - negative  - stroke core measures, stroke neuro signed off  - MRI brain w/ w/o contrast 10/12: parenchymal hemorrhage, acute/subacute R cerebellar stroke      CV:  - -160  - HTN: amlodipine 10 mg qd  - echo (9/30) EF 75%    Resp:  - tolerating trach collar 35%  - duonebs/mucomyst q12h    GI:  - Nepro TF via PEG (placed 10/21 by gen surg)  - bowel regimen, last BM 11/3    Renal:  - gabriel placed 10/29 d/t urinary retention   - FW flushes 250cc q6hrs   - hyperK 10/20: lokelma 5mg qd  - hyperPhos: phoslo 667mg TID   - Urinary retenion: Cardura 8 mg   - CKD: trend BUN/Cr  - Na goal 135-145  - renal US 10/1: echogenicity c/w chronic med renal dz, repeat 10/8: inc renal echogeicity, c/f medical renal disease w increased hydro     Endo:  - A1c 5.4    Heme:  - DVT ppx: SCDs, SQH 5000u q8h     ID:  - pan cultured 11/3 (w/o UA)   - empiric tx: vanc (11/3- ), zosyn (11/3- )  - 10/15 sputum +actinobacter baumanii, s/p unasyn (10/18-10/23)  - MRSA swab negative (10/2), sputum MSSA + , s/p 1 dose vanc (10/2), CTX (10/2 - 10/4), Ancef (10/4-10/14)     MISC:  - ophtho consult for keratitis, rec erythromycin ointment to rt eye q4hrs, ofloxacin ointment to rt eye QID, artificial tears to rt eye q2hrs, moisture chamber at bedtime    Dispo: regional, full code, pending placement and emergency medicaid     D/w Dr. D'Amico

## 2024-11-04 NOTE — PROGRESS NOTE ADULT - SUBJECTIVE AND OBJECTIVE BOX
SUBJECTIVE: Febrile 101.9 yesterday afternoon. NAEON. Patient seen this morning - eyes open but non-verbal, unable to obtain further history. Per nurse, no increase secretions from trach, no incr O2 requirements no diarrhea (last BM yesterday AM).       DIET:  Diet, NPO with Tube Feed:   Tube Feeding Modality: Gastrostomy  Nepro with Carb Steady (NEPRORTH)  Total Volume for 24 Hours (mL): 1080  Total Number of Cans: 5  Continuous  Starting Tube Feed Rate mL per Hour: 45  Increase Tube Feed Rate by (mL): 10     Every 4 hours  Until Goal Tube Feed Rate (mL per Hour): 60  Tube Feed Duration (in Hours): 18  Tube Feed Start Time: 10:00  Tube Feed Stop Time: 04:00  Banatrol TF     Qty per Day:  2 (10-28-24 @ 16:11) [Active]      MEDICATIONS:  MEDICATIONS  (STANDING):  acetylcysteine 10%  Inhalation 4 milliLiter(s) Inhalation every 12 hours  albuterol/ipratropium for Nebulization 3 milliLiter(s) Nebulizer every 12 hours  amLODIPine   Tablet 10 milliGRAM(s) Oral daily  artificial tears (preservative free) Ophthalmic Solution 1 Drop(s) Right EYE every 4 hours  calcium acetate 667 milliGRAM(s) Oral three times a day with meals  doxazosin 8 milliGRAM(s) Oral at bedtime  erythromycin   Ointment 1 Application(s) Right EYE every 6 hours  heparin   Injectable 5000 Unit(s) SubCutaneous every 8 hours  ofloxacin 0.3% Solution 1 Drop(s) Right EYE every 6 hours  petrolatum Ophthalmic Ointment 1 Application(s) Both EYES two times a day  piperacillin/tazobactam IVPB.. 4.5 Gram(s) IV Intermittent every 8 hours  polyethylene glycol 3350 17 Gram(s) Oral two times a day  sodium zirconium cyclosilicate 5 Gram(s) Oral daily  vancomycin  IVPB 1000 milliGRAM(s) IV Intermittent every 24 hours    MEDICATIONS  (PRN):  acetaminophen   Oral Liquid .. 650 milliGRAM(s) Oral every 6 hours PRN Temp greater or equal to 38C (100.4F), Mild Pain (1 - 3)  oxyCODONE    Solution 5 milliGRAM(s) Oral every 4 hours PRN Moderate Pain (4 - 6)      Allergies    Allergy Status Unknown    Intolerances        OBJECTIVE:  Vital Signs Last 24 Hrs  T(C): 36.7 (04 Nov 2024 13:47), Max: 37.2 (04 Nov 2024 06:43)  T(F): 98 (04 Nov 2024 13:47), Max: 99 (04 Nov 2024 08:35)  HR: 100 (04 Nov 2024 13:47) (86 - 103)  BP: 139/90 (04 Nov 2024 13:47) (118/76 - 139/90)  BP(mean): --  RR: 19 (04 Nov 2024 13:47) (17 - 19)  SpO2: 96% (04 Nov 2024 13:47) (93% - 98%)    Parameters below as of 04 Nov 2024 13:47  Patient On (Oxygen Delivery Method): room air      I&O's Summary    03 Nov 2024 07:01  -  04 Nov 2024 07:00  --------------------------------------------------------  IN: 2680 mL / OUT: 1950 mL / NET: 730 mL        PHYSICAL EXAM:  Gen: resting comfortably, NAD  HEENT: NCAT, MMM  Neck: trach with thick secretions   CV: RRR, no m/r/g, peripheral pulses 2+  Pulm: CTAB, no increased work of breathing, no rales/rhonchi  Abd: soft, ND, NT, PEG with abd binder  Skin: warm and dry  Ext: non-tender, no edema  Neuro: Eyes spontaneously open, does not follow commands, anisocoria   Psych: unable to assess     LABS:                        11.8   8.37  )-----------( 164      ( 04 Nov 2024 05:30 )             37.4     11-04    145  |  110[H]  |  65[H]  ----------------------------<  110[H]  4.8   |  23  |  2.87[H]    Ca    9.0      04 Nov 2024 05:30  Phos  5.0     11-04  Mg     2.7     11-04          CAPILLARY BLOOD GLUCOSE        Urinalysis Basic - ( 04 Nov 2024 05:30 )    Color: x / Appearance: x / SG: x / pH: x  Gluc: 110 mg/dL / Ketone: x  / Bili: x / Urobili: x   Blood: x / Protein: x / Nitrite: x   Leuk Esterase: x / RBC: x / WBC x   Sq Epi: x / Non Sq Epi: x / Bacteria: x        MICRODATA:    Culture - Sputum (collected 03 Nov 2024 16:05)  Source: .Sputum Sputum  Gram Stain (03 Nov 2024 22:37):    Numerous polymorphonuclear leukocytes per low power field    Numerous Squamous epithelial cells per low power field    Moderate Gram Negative Diplococci per oil power field    Few Gram Negative Rods per oil power field    Rare Gram positive cocci in pairs per oil power field        RADIOLOGY/OTHER STUDIES:  Reviewed

## 2024-11-04 NOTE — CHART NOTE - NSCHARTNOTEFT_GEN_A_CORE
Admitting Diagnosis:   Patient is a 44y old  Male who presents with a chief complaint of brain stem bleed (04 Nov 2024 01:08)    PAST MEDICAL & SURGICAL HISTORY:  Acute myocardial infarction  CVA (cerebral vascular accident)    Current Nutrition Order: Diet, NPO with Tube Feed:   Tube Feeding Modality: Gastrostomy  Nepro with Carb Steady (NEPRORTH)  Total Volume for 24 Hours (mL): 1080  Total Number of Cans: 5  Continuous  Starting Tube Feed Rate {mL per Hour}: 45  Increase Tube Feed Rate by (mL): 10     Every 4 hours  Until Goal Tube Feed Rate (mL per Hour): 60  Tube Feed Duration (in Hours): 18  Tube Feed Start Time: 10:00  Tube Feed Stop Time: 04:00  Banatrol TF     Qty per Day:  2 (10-28-24 @ 16:11) [Active]     PO Intake: Good (%) [   ]  Fair (50-75%) [   ] Poor (<25%) [   ] ... NPO with tube feeding     GI Issues: WDL; last bowel movement documented 11/3; receiving Banatrol via PEG but spoke with RN this AM who denied any episodes of diarrhea and on Miralax**    Pain: non-verbal indicators absent     Skin Integrity: WDL; 1+ L/R hand edema; PEG in place; cynthia score = 9, no pressure injuries documented       11-03-24 @ 07:01  -  11-04-24 @ 07:00  --------------------------------------------------------  IN: 2680 mL / OUT: 1950 mL / NET: 730 mL      Labs:   11-04    145  |  110[H]  |  65[H]  ----------------------------<  110[H]  4.8   |  23  |  2.87[H]    Ca    9.0      04 Nov 2024 05:30  Phos  5.0     11-04  Mg     2.7     11-04    Medications:  MEDICATIONS  (STANDING):  acetylcysteine 10%  Inhalation 4 milliLiter(s) Inhalation every 12 hours  albuterol/ipratropium for Nebulization 3 milliLiter(s) Nebulizer every 12 hours  amLODIPine   Tablet 10 milliGRAM(s) Oral daily  artificial tears (preservative free) Ophthalmic Solution 1 Drop(s) Right EYE every 4 hours  calcium acetate 667 milliGRAM(s) Oral three times a day with meals  doxazosin 8 milliGRAM(s) Oral at bedtime  erythromycin   Ointment 1 Application(s) Right EYE every 6 hours  heparin   Injectable 5000 Unit(s) SubCutaneous every 8 hours  ofloxacin 0.3% Solution 1 Drop(s) Right EYE every 6 hours  petrolatum Ophthalmic Ointment 1 Application(s) Both EYES two times a day  piperacillin/tazobactam IVPB.. 4.5 Gram(s) IV Intermittent every 8 hours  polyethylene glycol 3350 17 Gram(s) Oral two times a day  sodium chloride 0.9% Bolus 1000 milliLiter(s) IV Bolus once  sodium zirconium cyclosilicate 5 Gram(s) Oral daily  vancomycin  IVPB 1000 milliGRAM(s) IV Intermittent every 24 hours    MEDICATIONS  (PRN):  acetaminophen   Oral Liquid .. 650 milliGRAM(s) Oral every 6 hours PRN Temp greater or equal to 38C (100.4F), Mild Pain (1 - 3)  oxyCODONE    Solution 5 milliGRAM(s) Oral every 4 hours PRN Moderate Pain (4 - 6)      Dosing Anthropometrics:   Height for BMI (FEET)	5 Feet  Height for BMI (INCHES)	8 Inch(s)  Height for BMI (CM)	172.72 Centimeter(s)  Weight for BMI (lbs)	176.4 lb  Weight for BMI (kg)	80 kg  Body Mass Index	              26.8  ideal body weight:               154 pounds, pt is ~114% of ideal body weight    Weight Change: weight above as per RD note from 10/4; dosing weight of 100kg/ 220 pounds documented from 9/30 but ? accuracy, will continue to use weight of 80kg / 176 pounds. Would strongly recommend that nursing obtain new weight and updated weights weekly prn. RD to monitor trends.     Estimated energy needs:  Estimated Energy Needs Weight (lbs)	176 lb  Estimated Energy Needs Weight (kg)	80 kg  Estimated Energy Needs From (christiana/kg) 22  Estimated Energy Needs To (christiana/kg)	27  Estimated Energy Needs Calculated From (christiana/kg) 1760  Estimated Energy Needs Calculated To (christiana/kg)	2160    Estimated Protein Needs Weight (lbs)	176 lb  Estimated Protein Needs Weight (kg)	80 kg  Estimated Protein Needs From (g/kg)	1.0  Estimated Protein Needs To (g/kg)	1.4  Estimated Protein Needs Calculated From (g/kg) 80   Estimated Protein Needs Calculated To (g/kg)	112    Estimated Fluid Needs Weight (lbs)	176 lb  Estimated Fluid Needs Weight (kg)	80 kg  Estimated Fluid Needs From (ml/kg)	20  Estimated Fluid Needs To (ml/kg)	25  Estimated Fluid Needs Calculated From (ml/kg)	1600  Estimated Fluid Needs Calculated To (ml/kg)	2000    Other Calculations: IBW = 114%, between % IBW and no longer in ICU setting, therefore, used actual body weight for all calculations. Needs adjusted for age, clinical course, vented.     Subjective: This is a 44 year old male, unknown past medical history (reported stroke and MI by coworkers) presented to [facility] with AMS, Pt was working at [employer] when he was found down by coworkers. EMS called and pt brought to [facility] ED. Intubated, sedated, started on cardene for SBPs in 200s. CT head showed brain stem bleed. Transferred to NSICU for further management.    Patient seen at bedside on 9UR for nutrition follow-up; pt received non-verbal and unable to participate but awake and resting in bed, observed with trach collar. Remains NPO with tube feeds via PEG; feeds were not running upon RD visit this morning as feeds only running x18 hours, however, spoke with RN and no tube feed intolerance noted; as per flowsheet Nepro 1.8 tube feed running at goal rate of 60 cc/hr, continuing to meet 100% estimated nutrient needs. Noted pt receiving Banatrol BID via PEG but RN denied any episodes of diarrhea and also on Miralax, if diarrhea/ loose stools resolved, would recommend to discontinue Banatrol use. Skin: 1+ L/R hand edema documented; WDL, no pressure injuries documented. GI WDL; RN denies nausea/vomiting/diarrhea/constipation. Labs: K+ WNL; hyperphosphatemia and hypermagnesemia noted; elevated BUN/Cr; elevated Chloride; elevated AST/ALT. Meds: antibiotics, Miralax, lokelma, oxycodone, phoslo. RDN to remain available; see nutrition recommendations below.     Previous Nutrition Diagnosis: Inadequate Oral Intake related to inability to meet nutritional demands via PO as evidenced by NPO with tube feedings    Active [ x ]  Resolved [   ]    Goal: Pt to consistently meet at least >/= 75% of estimated energy needs via tolerated route that is consistent with goals of care during hospital stay    Recommendations:  1. NPO with tube feedings, continue with current tube feed regimen as below to continue to meet 100% estimated needs:   **Nepro 1.8 via PEG at goal rate of 60mL/hour x 18 hours, to provide 1080mL total volume from tube feeding, 1912 calories, 87g protein, 785mL free water; this meets ~24 calories/kg, 1.1g protein/kg; consider 190mL free water flush every 4 hours  2. Would strongly recommend to consider discontinuing Banatrol if diarrhea resolved  3. Maintain aspiration precautions at all times; monitor for signs/symptoms of intolerance*  4. Monitor weights (weekly), GI function, skin integrity; electrolytes, BMP, glucose, CBC per MD discretion **renal indices**  5. Pain and bowel regimen per medical team  6. Align nutrition with goals of care at all times    Risk Level: High [ x  ] Moderate [   ] Low [   ]     RD will continue to monitor PO intake, labs, hydration, and wt prn.   Niki Leo, MS, RDN, CDN

## 2024-11-04 NOTE — PROGRESS NOTE ADULT - PROBLEM SELECTOR PLAN 1
Developed sepsis 11/3 (febrile 101.9, tachycardic 110s) with increase in WBC and procalcitonin.   - source unclear, differential is broad (pulm vs , less likely intra-abd, SSTI)  ---no increased O2 requirements of new opacity on CXR, RVP neg, SC in process (suspect polymicrobial)  ---no abdominal tenderness appreciated, no diarrhea per nursing  ---no skin rashes noted, no decub wounds per nursing   ---chronic gabriel,  source possible, yield of UC likely lower given on abx  ---BC pending, r/o bacteremia   - currently on broad spectrums, will de-escalate pending cultures  ---vancomycin 1g q24hr, check random level today given eGFR  ---pip-tazo 4.5mg q8hr  - persistent tachycardia, recommend 1L IVFH to see if fluid responsive

## 2024-11-05 LAB
-  LEVOFLOXACIN: SIGNIFICANT CHANGE UP
-  MINOCYCLINE: SIGNIFICANT CHANGE UP
-  TRIMETHOPRIM/SULFAMETHOXAZOLE: SIGNIFICANT CHANGE UP
ANION GAP SERPL CALC-SCNC: 13 MMOL/L — SIGNIFICANT CHANGE UP (ref 5–17)
BUN SERPL-MCNC: 62 MG/DL — HIGH (ref 7–23)
CALCIUM SERPL-MCNC: 8.9 MG/DL — SIGNIFICANT CHANGE UP (ref 8.4–10.5)
CHLORIDE SERPL-SCNC: 107 MMOL/L — SIGNIFICANT CHANGE UP (ref 96–108)
CO2 SERPL-SCNC: 22 MMOL/L — SIGNIFICANT CHANGE UP (ref 22–31)
CREAT SERPL-MCNC: 2.82 MG/DL — HIGH (ref 0.5–1.3)
CULTURE RESULTS: ABNORMAL
EGFR: 27 ML/MIN/1.73M2 — LOW
EGFR: 27 ML/MIN/1.73M2 — LOW
GLUCOSE SERPL-MCNC: 117 MG/DL — HIGH (ref 70–99)
HCT VFR BLD CALC: 36.6 % — LOW (ref 39–50)
HGB BLD-MCNC: 11.3 G/DL — LOW (ref 13–17)
MAGNESIUM SERPL-MCNC: 2.4 MG/DL — SIGNIFICANT CHANGE UP (ref 1.6–2.6)
MCHC RBC-ENTMCNC: 29.6 PG — SIGNIFICANT CHANGE UP (ref 27–34)
MCHC RBC-ENTMCNC: 30.9 G/DL — LOW (ref 32–36)
MCV RBC AUTO: 95.8 FL — SIGNIFICANT CHANGE UP (ref 80–100)
METHOD TYPE: SIGNIFICANT CHANGE UP
METHOD TYPE: SIGNIFICANT CHANGE UP
NRBC # BLD: 0 /100 WBCS — SIGNIFICANT CHANGE UP (ref 0–0)
NRBC BLD-RTO: 0 /100 WBCS — SIGNIFICANT CHANGE UP (ref 0–0)
ORGANISM # SPEC MICROSCOPIC CNT: ABNORMAL
ORGANISM # SPEC MICROSCOPIC CNT: ABNORMAL
ORGANISM # SPEC MICROSCOPIC CNT: SIGNIFICANT CHANGE UP
PHOSPHATE SERPL-MCNC: 4.7 MG/DL — HIGH (ref 2.5–4.5)
PLATELET # BLD AUTO: 141 K/UL — LOW (ref 150–400)
POTASSIUM SERPL-MCNC: 4.6 MMOL/L — SIGNIFICANT CHANGE UP (ref 3.5–5.3)
POTASSIUM SERPL-SCNC: 4.6 MMOL/L — SIGNIFICANT CHANGE UP (ref 3.5–5.3)
RBC # BLD: 3.82 M/UL — LOW (ref 4.2–5.8)
RBC # FLD: 13.9 % — SIGNIFICANT CHANGE UP (ref 10.3–14.5)
SODIUM SERPL-SCNC: 142 MMOL/L — SIGNIFICANT CHANGE UP (ref 135–145)
SPECIMEN SOURCE: SIGNIFICANT CHANGE UP
WBC # BLD: 4.22 K/UL — SIGNIFICANT CHANGE UP (ref 3.8–10.5)
WBC # FLD AUTO: 4.22 K/UL — SIGNIFICANT CHANGE UP (ref 3.8–10.5)

## 2024-11-05 PROCEDURE — 99232 SBSQ HOSP IP/OBS MODERATE 35: CPT

## 2024-11-05 PROCEDURE — 99254 IP/OBS CNSLTJ NEW/EST MOD 60: CPT

## 2024-11-05 PROCEDURE — 99233 SBSQ HOSP IP/OBS HIGH 50: CPT

## 2024-11-05 RX ORDER — SULFAMETHOXAZOLE/TRIMETHOPRIM 800-160 MG
1 TABLET ORAL
Refills: 0 | Status: DISCONTINUED | OUTPATIENT
Start: 2024-11-05 | End: 2024-11-05

## 2024-11-05 RX ORDER — PIPERACILLIN-TAZO-DEXTROSE,ISO 3.375G/5
4.5 IV SOLUTION, PIGGYBACK PREMIX FROZEN(ML) INTRAVENOUS EVERY 8 HOURS
Refills: 0 | Status: DISCONTINUED | OUTPATIENT
Start: 2024-11-05 | End: 2024-11-06

## 2024-11-05 RX ADMIN — Medication 1 DROP(S): at 12:47

## 2024-11-05 RX ADMIN — Medication 667 MILLIGRAM(S): at 09:30

## 2024-11-05 RX ADMIN — Medication 1 DROP(S): at 09:29

## 2024-11-05 RX ADMIN — AMLODIPINE BESYLATE 10 MILLIGRAM(S): 10 TABLET ORAL at 06:44

## 2024-11-05 RX ADMIN — SODIUM ZIRCONIUM CYCLOSILICATE 5 GRAM(S): 5 POWDER, FOR SUSPENSION ORAL at 12:45

## 2024-11-05 RX ADMIN — ERYTHROMYCIN 1 APPLICATION(S): 5 OINTMENT OPHTHALMIC at 06:54

## 2024-11-05 RX ADMIN — POLYETHYLENE GLYCOL 3350 17 GRAM(S): 17 POWDER, FOR SOLUTION ORAL at 06:47

## 2024-11-05 RX ADMIN — HEPARIN SODIUM 5000 UNIT(S): 1000 INJECTION INTRAVENOUS; SUBCUTANEOUS at 22:20

## 2024-11-05 RX ADMIN — Medication 1 DROP(S): at 05:01

## 2024-11-05 RX ADMIN — HEPARIN SODIUM 5000 UNIT(S): 1000 INJECTION INTRAVENOUS; SUBCUTANEOUS at 06:50

## 2024-11-05 RX ADMIN — Medication 650 MILLIGRAM(S): at 22:50

## 2024-11-05 RX ADMIN — Medication 667 MILLIGRAM(S): at 18:52

## 2024-11-05 RX ADMIN — Medication 667 MILLIGRAM(S): at 14:13

## 2024-11-05 RX ADMIN — Medication 25 GRAM(S): at 14:12

## 2024-11-05 RX ADMIN — Medication 25 GRAM(S): at 06:50

## 2024-11-05 RX ADMIN — ERYTHROMYCIN 1 APPLICATION(S): 5 OINTMENT OPHTHALMIC at 12:46

## 2024-11-05 RX ADMIN — Medication 1 DROP(S): at 22:21

## 2024-11-05 RX ADMIN — ERYTHROMYCIN 1 APPLICATION(S): 5 OINTMENT OPHTHALMIC at 23:00

## 2024-11-05 RX ADMIN — IPRATROPIUM BROMIDE AND ALBUTEROL SULFATE 3 MILLILITER(S): .5; 2.5 SOLUTION RESPIRATORY (INHALATION) at 06:44

## 2024-11-05 RX ADMIN — OFLOXACIN 1 DROP(S): 3 SOLUTION OPHTHALMIC at 12:46

## 2024-11-05 RX ADMIN — OFLOXACIN 1 DROP(S): 3 SOLUTION OPHTHALMIC at 18:53

## 2024-11-05 RX ADMIN — Medication 1 APPLICATION(S): at 06:55

## 2024-11-05 RX ADMIN — Medication 1 APPLICATION(S): at 18:54

## 2024-11-05 RX ADMIN — IPRATROPIUM BROMIDE AND ALBUTEROL SULFATE 3 MILLILITER(S): .5; 2.5 SOLUTION RESPIRATORY (INHALATION) at 18:51

## 2024-11-05 RX ADMIN — HEPARIN SODIUM 5000 UNIT(S): 1000 INJECTION INTRAVENOUS; SUBCUTANEOUS at 14:12

## 2024-11-05 RX ADMIN — ACETYLCYSTEINE 4 MILLILITER(S): 200 INHALANT RESPIRATORY (INHALATION) at 22:22

## 2024-11-05 RX ADMIN — ACETYLCYSTEINE 4 MILLILITER(S): 200 INHALANT RESPIRATORY (INHALATION) at 09:30

## 2024-11-05 RX ADMIN — Medication 1 DROP(S): at 01:53

## 2024-11-05 RX ADMIN — Medication 650 MILLIGRAM(S): at 22:20

## 2024-11-05 RX ADMIN — Medication 25 GRAM(S): at 22:19

## 2024-11-05 RX ADMIN — OFLOXACIN 1 DROP(S): 3 SOLUTION OPHTHALMIC at 06:54

## 2024-11-05 RX ADMIN — Medication 1 DROP(S): at 16:23

## 2024-11-05 RX ADMIN — ERYTHROMYCIN 1 APPLICATION(S): 5 OINTMENT OPHTHALMIC at 18:53

## 2024-11-05 RX ADMIN — DOXAZOSIN MESYLATE 8 MILLIGRAM(S): 8 TABLET ORAL at 22:21

## 2024-11-05 NOTE — CONSULT NOTE ADULT - ASSESSMENT
44 YOF with unclear PMH (?stroke, MI) who was found down at work, intubated for airway protection and found to have acute parenchymal hemorrhage within nabil with mass effect (+ acute/subacute right cerebellar infarct) in setting of hypertensive emergency, transferred to [facility] for further neurosurgical care. Hospital course c/b poor neurologic recovery s/p trach-PEG, AUR s/p gabriel, ANIKA on CKD c/b hyperkalemia, HAP s/p amp-sulbactam (EOT 10/23). Developed fever 101.9 with increase in WBC (10.9) and elevated procal (0.66) on 11/3- was pancultured (without UA), CXR was wnl. Started on vanc and zosyn on 11/3. Vanc discontinued on 11/4 given low concern for MRSA. Defervesced with resolution of leukocytosis. Bcxs ngtd, MRSA PCR negative, MSSA positive, RVP/COVID negative, Sputum culture with stenotrophomonas and commensal iram. Source of fever unclear -- ?HAP vs tracheitis vs , less likely GI (no diarrhea) or SSTI. Suspect steno that grew in sputum culture is colonizer; patient clinically improved without steno coverage. Patient has not had increased O2 requirements and CXR unremarkable so overall low suspicion for HAP but given thickened tracheal secretions on exam and improvement with zosyn, ok with continuing for short course     Suggest:  -f/u bcxs   -continue zosyn 4.5g IV Q8 via EI  -duration of antibiotics will likely be 5-7 days     Team 2 will follow you.    Case d/w primary team.  Final recommendation pending attending note.    Tati Ambriz, Infectious Diseases PA  Please reach out for any questions 9 am-5pm. For evenings and weekends, please call the ID physician on call.  Work cell: [phone]     44 YOF with unclear PMH (?stroke, MI) who was found down at work, intubated for airway protection and found to have acute parenchymal hemorrhage within nabil with mass effect (+ acute/subacute right cerebellar infarct) in setting of hypertensive emergency, transferred to [facility] for further neurosurgical care. Hospital course c/b poor neurologic recovery s/p trach-PEG, AUR s/p gabriel, ANIKA on CKD c/b hyperkalemia, HAP s/p amp-sulbactam (EOT 10/23). Developed fever 101.9 with increase in WBC (10.9) and elevated procal (0.66) on 11/3- was pancultured (without UA), CXR was wnl. Started on vanc and zosyn on 11/3. Vanc discontinued on 11/4 given low concern for MRSA. Defervesced with resolution of leukocytosis. Bcxs ngtd, MRSA PCR negative, MSSA positive, RVP/COVID negative, Sputum culture with stenotrophomonas and commensal iram. Source of fever unclear -- ?HAP vs tracheitis vs , less likely GI (no diarrhea) or SSTI. Suspect steno that grew in sputum culture is colonizer; patient clinically improved without steno coverage. Patient has not had increased O2 requirements and CXR unremarkable so overall low suspicion for HAP but given thickened tracheal secretions on exam and improvement with zosyn, ok with continuing for short course     Suggest:  -f/u bcxs   -continue zosyn 4.5g IV Q8 via EI  -duration of antibiotics will likely be 5-7 days     Case d/w primary team.  Final recommendation pending attending note.    Tati Ambriz, Infectious Diseases PA  Please reach out for any questions 9 am-5pm. For evenings and weekends, please call the ID physician on call.  Work cell: [phone]

## 2024-11-05 NOTE — CONSULT NOTE ADULT - SUBJECTIVE AND OBJECTIVE BOX
INFECTIOUS DISEASES INITIAL CONSULT NOTE    HPI:  44 YOF with unclear PMH (?stroke, MI) who was found down at work, intubated for airway protection and found to have acute parenchymal hemorrhage within nabil with mass effect (+ acute/subacute right cerebellar infarct) in setting of hypertensive emergency, transferred to [facility] for further neurosurgical care. Hospital course c/b poor neurologic recovery s/p trach-PEG, AUR s/p gabriel, ANIKA on CKD c/b hyperkalemia, HAP s/p amp-sulbactam (EOT 10/23). Patient seen this morning resting in bed. Intermittently opens eyes to voice but inconsistently tracking, non-verbal, does not follow commands. History limited. On 11/3, spiked fever of 101.9 with increase in WBC (10.9) and elevated procal (0.660- was pancultured (without UA), CXR was wnl. Started on vanc and zosyn on 11/3. Vanc discontinued on 11/4 given low concern for MRSA.       PAST MEDICAL & SURGICAL HISTORY:  Acute myocardial infarction      CVA (cerebral vascular accident)            Review of Systems:   EBER       ANTIBIOTICS:  MEDICATIONS  (STANDING):  acetylcysteine 10%  Inhalation 4 milliLiter(s) Inhalation every 12 hours  albuterol/ipratropium for Nebulization 3 milliLiter(s) Nebulizer every 12 hours  amLODIPine   Tablet 10 milliGRAM(s) Oral daily  artificial tears (preservative free) Ophthalmic Solution 1 Drop(s) Right EYE every 4 hours  calcium acetate 667 milliGRAM(s) Oral three times a day with meals  doxazosin 8 milliGRAM(s) Oral at bedtime  erythromycin   Ointment 1 Application(s) Right EYE every 6 hours  heparin   Injectable 5000 Unit(s) SubCutaneous every 8 hours  ofloxacin 0.3% Solution 1 Drop(s) Right EYE every 6 hours  petrolatum Ophthalmic Ointment 1 Application(s) Both EYES two times a day  piperacillin/tazobactam IVPB.. 4.5 Gram(s) IV Intermittent every 8 hours  polyethylene glycol 3350 17 Gram(s) Oral two times a day  sodium zirconium cyclosilicate 5 Gram(s) Oral daily    MEDICATIONS  (PRN):  acetaminophen   Oral Liquid .. 650 milliGRAM(s) Oral every 6 hours PRN Temp greater or equal to 38C (100.4F), Mild Pain (1 - 3)      Allergies    Allergy Status Unknown    Intolerances        SOCIAL HISTORY:    FAMILY HISTORY:   no FH leading to current infection    Vital Signs Last 24 Hrs  T(C): 37.6 (05 Nov 2024 13:26), Max: 37.8 (04 Nov 2024 16:25)  T(F): 99.6 (05 Nov 2024 13:26), Max: 100.1 (04 Nov 2024 16:25)  HR: 89 (05 Nov 2024 13:26) (87 - 101)  BP: 131/89 (05 Nov 2024 13:26) (129/87 - 141/91)  BP(mean): 105 (04 Nov 2024 16:25) (105 - 105)  RR: 19 (05 Nov 2024 13:26) (18 - 20)  SpO2: 97% (05 Nov 2024 13:26) (95% - 97%)    Parameters below as of 05 Nov 2024 13:26  Patient On (Oxygen Delivery Method): room air        11-04-24 @ 07:01  -  11-05-24 @ 07:00  --------------------------------------------------------  IN: 0 mL / OUT: 2650 mL / NET: -2650 mL    11-05-24 @ 07:01  -  11-05-24 @ 14:49  --------------------------------------------------------  IN: 710 mL / OUT: 650 mL / NET: 60 mL        PHYSICAL EXAM:  Constitutional: NAD  Eyes: the sclera and conjunctiva were normal.   ENT: the ears and nose were normal in appearance.   Neck: +trach  Pulmonary: no respiratory distress and lungs were clear to auscultation anteriorly   Heart: heart rate was normal and rhythm regular, normal S1 and S2  Vascular: there was no peripheral edema  Abdomen:  soft, +PEG  Neurological: eyes open but not tracking, does not follow commands   R PIV without erythema or edema         LABS:                        11.3   4.22  )-----------( 141      ( 05 Nov 2024 05:30 )             36.6     11-05    142  |  107  |  62[H]  ----------------------------<  117[H]  4.6   |  22  |  2.82[H]    Ca    8.9      05 Nov 2024 05:30  Phos  4.7     11-05  Mg     2.4     11-05        Urinalysis Basic - ( 05 Nov 2024 05:30 )    Color: x / Appearance: x / SG: x / pH: x  Gluc: 117 mg/dL / Ketone: x  / Bili: x / Urobili: x   Blood: x / Protein: x / Nitrite: x   Leuk Esterase: x / RBC: x / WBC x   Sq Epi: x / Non Sq Epi: x / Bacteria: x        MICROBIOLOGY:    Culture - Sputum (collected 11-03-24 @ 16:05)  Source: .Sputum Sputum  Gram Stain (11-03-24 @ 22:37):    Numerous polymorphonuclear leukocytes per low power field    Numerous Squamous epithelial cells per low power field    Moderate Gram Negative Diplococci per oil power field    Few Gram Negative Rods per oil power field    Rare Gram positive cocci in pairs per oil power field  Preliminary Report (11-04-24 @ 22:06):    Moderate Stenotrophomonas maltophilia    Commensal iram consistent with body site    Culture - Blood (collected 11-03-24 @ 15:46)  Source: .Blood BLOOD  Preliminary Report (11-04-24 @ 21:01):    No growth at 24 hours    Culture - Blood (collected 11-03-24 @ 15:46)  Source: .Blood BLOOD  Preliminary Report (11-04-24 @ 21:01):    No growth at 24 hours        RADIOLOGY & ADDITIONAL STUDIES:  reviewed  INFECTIOUS DISEASES INITIAL CONSULT NOTE    HPI:  44 YOM with unclear PMH (?stroke, MI) who was found down at work, intubated for airway protection and found to have acute parenchymal hemorrhage within nabil with mass effect (+ acute/subacute right cerebellar infarct) in setting of hypertensive emergency, transferred to [facility] for further neurosurgical care. Hospital course c/b poor neurologic recovery s/p trach-PEG, AUR s/p gabriel, ANIKA on CKD c/b hyperkalemia, HAP s/p amp-sulbactam (EOT 10/23). Patient seen this morning resting in bed. Intermittently opens eyes to voice but inconsistently tracking, non-verbal, does not follow commands. History limited. On 11/3, spiked fever of 101.9 with increase in WBC (10.9) and elevated procal (0.660- was pancultured (without UA), CXR was wnl. Started on vanc and zosyn on 11/3. Vanc discontinued on 11/4 given low concern for MRSA.       PAST MEDICAL & SURGICAL HISTORY:  Acute myocardial infarction      CVA (cerebral vascular accident)            Review of Systems:   EBER       ANTIBIOTICS:  MEDICATIONS  (STANDING):  acetylcysteine 10%  Inhalation 4 milliLiter(s) Inhalation every 12 hours  albuterol/ipratropium for Nebulization 3 milliLiter(s) Nebulizer every 12 hours  amLODIPine   Tablet 10 milliGRAM(s) Oral daily  artificial tears (preservative free) Ophthalmic Solution 1 Drop(s) Right EYE every 4 hours  calcium acetate 667 milliGRAM(s) Oral three times a day with meals  doxazosin 8 milliGRAM(s) Oral at bedtime  erythromycin   Ointment 1 Application(s) Right EYE every 6 hours  heparin   Injectable 5000 Unit(s) SubCutaneous every 8 hours  ofloxacin 0.3% Solution 1 Drop(s) Right EYE every 6 hours  petrolatum Ophthalmic Ointment 1 Application(s) Both EYES two times a day  piperacillin/tazobactam IVPB.. 4.5 Gram(s) IV Intermittent every 8 hours  polyethylene glycol 3350 17 Gram(s) Oral two times a day  sodium zirconium cyclosilicate 5 Gram(s) Oral daily    MEDICATIONS  (PRN):  acetaminophen   Oral Liquid .. 650 milliGRAM(s) Oral every 6 hours PRN Temp greater or equal to 38C (100.4F), Mild Pain (1 - 3)      Allergies    Allergy Status Unknown    Intolerances        SOCIAL HISTORY:    FAMILY HISTORY:   no FH leading to current infection    Vital Signs Last 24 Hrs  T(C): 37.6 (05 Nov 2024 13:26), Max: 37.8 (04 Nov 2024 16:25)  T(F): 99.6 (05 Nov 2024 13:26), Max: 100.1 (04 Nov 2024 16:25)  HR: 89 (05 Nov 2024 13:26) (87 - 101)  BP: 131/89 (05 Nov 2024 13:26) (129/87 - 141/91)  BP(mean): 105 (04 Nov 2024 16:25) (105 - 105)  RR: 19 (05 Nov 2024 13:26) (18 - 20)  SpO2: 97% (05 Nov 2024 13:26) (95% - 97%)    Parameters below as of 05 Nov 2024 13:26  Patient On (Oxygen Delivery Method): room air        11-04-24 @ 07:01  -  11-05-24 @ 07:00  --------------------------------------------------------  IN: 0 mL / OUT: 2650 mL / NET: -2650 mL    11-05-24 @ 07:01  -  11-05-24 @ 14:49  --------------------------------------------------------  IN: 710 mL / OUT: 650 mL / NET: 60 mL        PHYSICAL EXAM:  Constitutional: NAD  Eyes: the sclera and conjunctiva were normal.   ENT: the ears and nose were normal in appearance.   Neck: +trach  Pulmonary: no respiratory distress and lungs were clear to auscultation anteriorly   Heart: heart rate was normal and rhythm regular, normal S1 and S2  Vascular: there was no peripheral edema  Abdomen:  soft, +PEG  Neurological: eyes open but not tracking, does not follow commands   R PIV without erythema or edema         LABS:                        11.3   4.22  )-----------( 141      ( 05 Nov 2024 05:30 )             36.6     11-05    142  |  107  |  62[H]  ----------------------------<  117[H]  4.6   |  22  |  2.82[H]    Ca    8.9      05 Nov 2024 05:30  Phos  4.7     11-05  Mg     2.4     11-05        Urinalysis Basic - ( 05 Nov 2024 05:30 )    Color: x / Appearance: x / SG: x / pH: x  Gluc: 117 mg/dL / Ketone: x  / Bili: x / Urobili: x   Blood: x / Protein: x / Nitrite: x   Leuk Esterase: x / RBC: x / WBC x   Sq Epi: x / Non Sq Epi: x / Bacteria: x        MICROBIOLOGY:    Culture - Sputum (collected 11-03-24 @ 16:05)  Source: .Sputum Sputum  Gram Stain (11-03-24 @ 22:37):    Numerous polymorphonuclear leukocytes per low power field    Numerous Squamous epithelial cells per low power field    Moderate Gram Negative Diplococci per oil power field    Few Gram Negative Rods per oil power field    Rare Gram positive cocci in pairs per oil power field  Preliminary Report (11-04-24 @ 22:06):    Moderate Stenotrophomonas maltophilia    Commensal iram consistent with body site    Culture - Blood (collected 11-03-24 @ 15:46)  Source: .Blood BLOOD  Preliminary Report (11-04-24 @ 21:01):    No growth at 24 hours    Culture - Blood (collected 11-03-24 @ 15:46)  Source: .Blood BLOOD  Preliminary Report (11-04-24 @ 21:01):    No growth at 24 hours        RADIOLOGY & ADDITIONAL STUDIES:  reviewed

## 2024-11-05 NOTE — PROGRESS NOTE ADULT - ASSESSMENT
44 YOF with unclear PMH (?stroke, MI) who was found down at work, intubated for airway protection and found to have acute parenchymal hemorrhage within nabil with mass effect (+ acute/subacute right cerebellar infarct) in setting of hypertensive emergency, transferred to [facility] for further neurosurgical care. Hospital course c/b poor neurologic recovery s/p trach-PEG, AUR s/p gabriel, ANIKA on CKD c/b hyperkalemia, HAP s/p amp-sulbactam (EOT 10/23).

## 2024-11-05 NOTE — PROGRESS NOTE ADULT - SUBJECTIVE AND OBJECTIVE BOX
HPI:  43 yo M presented to [facility] with AMS, Pt was working at [employer] when he was found down by coworkers. EMS called and pt brought to [facility] ED. Intubated, sedated, started on cardene for SBPs in 200s. CT head showed brain stem bleed. Transferred to NSICU for further management.  (30 Sep 2024 12:55)    OVERNIGHT EVENTS: Subjective data unable to be obtained due to patients neurologic status.     HOSPITAL COURSE:  9/30: transferred from [facility]. A line placed. Versed dc'd. Cy Rader at bedside, states pt has family in [place], cannot confirm medications or PMH other than stroke and MI. 250cc bolus 3% given. LR switched to NS. hydralazine 25q8 started, 3% started, switched propofol to precedex   10/1: stability CTH done. Added labetalol, started TF. Palliative consulted. ethics consulted to determine surrogate. febrile 103, pan cx sent  10/2: BD 2, GEORGE overnight. TF resumed. Desatt'd to 80s, FiO2 inc. to 50. Fentanyl given, ABG, CXR ordered. Maxxed on precedex, started on propofol for DARIEN -4 - -5. Precedex dc'd. Duonebs, mucomyst, hypertonic added. 3% dc'd. Cardene dc'd. Start vanc/CTX. Increased labetalol 200q8. MRSA negative, dc'd vanc. ETT pulled back 2cm x 2, good positioning after confirmatory chest xray. Ethics attempting to establish HCP with family. Na 159, starting FW 250q6 for range 150-155.   10/3: BD3, GEORGE o/n, neuro stable. Na elevating, FW increased to 300q6. Dc'd bowel reg for diarrhea. vEEG started. SQH 5000q8 tonight.   10/4: BD 4, albumn bolus, incr. LR to 80 2/2 incr. in Cr, LR to 100cc/hr for uptrending Cr. Started 7% hypersal for 48hrs and SL atropine for inline/oral thick secretions. Dc'd CTX and started ancef for MSSA in the sputum. Nephrology consulted for CKD, f/u recs. SBP 170s, given hydralazine 10mg IVP.   10/5: BD5, o/n 10mg IVP hydralazine given for SBP 170s and started on hydralazine 25q8 via OGT. 10mg IV push labetalol for SBP > 160s. RT placed for diarrhea.   10/6: BD6, o/n FW increased to 350q4 per nephrology recs. IV tylenol for temp 100.6, SBp 160s presumed uncomfortable.   10/7: BD7, overnight pancultured for temp 101.8F.   10/8: BD8. GEORGE. Cr bumped. decreased LR to 75cc/hr. Adding simethicone ATC. incr hydralazine 50mgTID. Incr labetalol 300mgTID. Na 145, decreased FWF to 250q6. Start precedex. FENa consistent with intrinsic kidney injury. Pend repeat renal US. Retaining up to 1.3L, bladder scans q6, straight cath PRN  10/9: BD 9. GEORGE overnight. Neuro stable. abd xray for distention w non-specific gas pattern, OGT to LIWS for morning. duonebs/mucomyst to q8 for improving secretions. Changed tube feeds to Jevity 1.5 20cc/hr, low rate due to abdominal distention, nepro dense and more difficult to digest. Tolerating CPAP, confirmed by ABG.   10/10: BD 10. GEORGE overnight. Neuro stable. (+) gabriel for urinary retention on bladder scan. inc TF to goal rate of 40cc/hr. family leaning toward pursuing trach/PEG. 1/2 amp for FS 81.   10/11: BD 11. GEORGE overnight. Neuro stable. Trach/PEG consults placed.   10/12: BD 12. GEORGE overnight. Neuro stable. MRI brain complete.   10/13: BD 13. Increase flomax. Hold SQH after PM dose for trach tm. IVL.   10/14: BD 14. GEORGE overnight, remains on AC/VC. Gabriel placed for urinary retention. Dc'd free water.  S/p trach with pulm. NGT placed and CXR confirmed in good position.   10/15: BD 15, GEORGE ovn. resumed feeds. spiked 101, pan cx sent.   10/16: BD 16. GEORGE ovn. Lokelma 5mg for K+ 5.4. Started vanc q 24/zosyn for empiric PNA coverage, IVF to 100/hr. PEG held for fever.   10/17: BD 17,  ordered serum osm and urine osm for am. Started sinemet for neurostimulation. Increased cardura to 0.8. Started FW 100q4, dc'd IVF. MRSA negative, dc'd vanc. NGT replaced d/t coiling.   10/18: BD 18, GEORGE overnight, neuro stable. Amantadine added for neurostim. zosyn changed to unasyn for acinetobacter baumannii, failed TOV and required SC  10/19: BD 19, GEORGE ovn. cardura 2mg added for retention. labetalol decreased 200q8, hydralazine decreased 25q8. Gabriel replaced.   10/20: BD20, GEORGE overnight. NGT dislodged, replaced. PEG tomorrow w/ gen surg, FW increased to 150q4 and labetalol decreased to 100q8, lokelma given for hyperkalemia.   10/21: BD 21. POD0 PEG placement with Gen surg. decr labetolol to 50q8, incr. cardura to 0.4, started lokelma and phoslo, dc gabriel POD0 PEG placement with Gen surg.  10/22: BD 22. Plan to start TF today via PEG. dc labetalol, Following ophtho recs. Increased apnea settings - found to be in cheyne-moe respiration. CPAP 5/5.  10/23: BD 23. hydralazine d/c'd, trach collar trial today. Rectal tube placed at 6am.  10/24: BD24, o/n lokelma held due to diarrhea. Free water 100q6 resumed. dc'd tamsulosin, amantadine. Incr'd cardura to 8mg qhs. Dc'd FW. Switched jevity to nepro. gabriel placed for high urine output. Started SL atropine for oral secretions. Dc'd free water.  10/25: BD25, o/n decreased suctioning requirements to > q4hrs, GEORGE. Cr improving, cont phoslo, lokelma held at this time. Gabriel placed yest, cont. Tolerating trach collar. Given 500cc plasmalyte bolus for ANIKA. Dc'd sinemet.   10/26: BD26, o/n resumed lokelma 5mg daily and resumed 100cc free water q6hrs. Change in neuro status with new right pupillary dilation with anisocoria (right pupil 6mm fixed and left pupil 3mm briskly reactive). Given 23.4% NaCl bullet, taken for emergent CTH showing mostly resolved pontine hemorrhage, continued brainstem hypodensity likely edema d/t hemorrhage, no new hemorrhage or infarct, no herniation, mild increase in size of left lateral ventricle. Vitals remaine stable. Na goal > 140.   10/27: BD27, o/n GEORGE.Neuro stable. Pend stepdown with airway bed.   10/28: BD 28. GEORGE overnight. Neuro stable. Miralax ordered. Gabriel removed, pending TOV.  10/29: BD 29. GEORGE o/n. Given 2L NS over 8 hrs for increased BUN/Cr ratio. Gabriel placed for frequent straight cath.   10/30: BD 30.   10/31: BD 31. GEORGE overnight. Na 149, increased free water to 200q6. 1L NS for uptrending BUN.   11/1: BD 32. GEORGE overnight. Given 1L NS for dehydration. Na 146, increased FW to 250q6.   11/2: BD 33 GEORGE overnight, neuro stable, given 500cc bolus for net negative status and tachycardia   11/3: BD 34, GEORGE overnight, neuro stable. Patient remains tachycardic, EKG showing sinus tachycardia, given additional 500cc NS bolus. Febrile to 101.9F, pan cultured (without UA), CXR WNL, given tylenol.   11/4: BD 35, GEORGE overnight, neuro stable. Given 1L NS for tachycardia. sputum (+) for stenotropohomas maltophilia.   11/5: BD 36 GEORGE overnight, neuro stable.     Vital Signs Last 24 Hrs  T(C): 37.7 (04 Nov 2024 20:53), Max: 37.8 (04 Nov 2024 16:25)  T(F): 99.8 (04 Nov 2024 20:53), Max: 100.1 (04 Nov 2024 16:25)  HR: 101 (04 Nov 2024 20:53) (86 - 103)  BP: 129/87 (04 Nov 2024 20:53) (127/88 - 139/90)  BP(mean): 105 (04 Nov 2024 16:25) (105 - 105)  RR: 18 (04 Nov 2024 20:53) (17 - 19)  SpO2: 97% (04 Nov 2024 20:53) (94% - 98%)    Parameters below as of 04 Nov 2024 20:53  Patient On (Oxygen Delivery Method): room air        I&O's Summary    03 Nov 2024 07:01  -  04 Nov 2024 07:00  --------------------------------------------------------  IN: 2680 mL / OUT: 1950 mL / NET: 730 mL    04 Nov 2024 07:01  -  05 Nov 2024 00:35  --------------------------------------------------------  IN: 0 mL / OUT: 1150 mL / NET: -1150 mL        PHYSICAL EXAM:  General: NAD, pt laying in bed  HEENT: PERRL 4mm, rt eye injected/erythematous: but improving from previous, trache collar in place, tracheostomy tube in place  Cardiovascular: RRR, S1, S2  Respiratory: breathing non-labored on RA, chest rise symmetric  GI: abd soft, NTND peg tube in place   : gabriel in place  Neuro: A&Ox0, eyes track vertically, follows commands in RUE: hand  2/5, tricep 4/5; otherwise 0/5  RLE: spontaneous trace movement in rt lower; LUE 0/5, LLE triple flexes to noxious  Extremities: warm and well perfused  Wound/incision: n/a  Drain: n/a    TUBES/LINES:  [x] Gabriel  [] Wound Drains  [] Others      DIET:  [] NPO  [] Mechanical  [x] Tube feeds via PEG tube    LABS:                        11.8   8.37  )-----------( 164      ( 04 Nov 2024 05:30 )             37.4     11-04    145  |  110[H]  |  65[H]  ----------------------------<  110[H]  4.8   |  23  |  2.87[H]    Ca    9.0      04 Nov 2024 05:30  Phos  5.0     11-04  Mg     2.7     11-04        Urinalysis Basic - ( 04 Nov 2024 05:30 )    Color: x / Appearance: x / SG: x / pH: x  Gluc: 110 mg/dL / Ketone: x  / Bili: x / Urobili: x   Blood: x / Protein: x / Nitrite: x   Leuk Esterase: x / RBC: x / WBC x   Sq Epi: x / Non Sq Epi: x / Bacteria: x          CAPILLARY BLOOD GLUCOSE          Drug Levels: [] N/A    CSF Analysis: [] N/A      Allergies    Allergy Status Unknown    Intolerances      MEDICATIONS:  Antibiotics:  piperacillin/tazobactam IVPB.. 4.5 Gram(s) IV Intermittent every 8 hours  vancomycin  IVPB 1000 milliGRAM(s) IV Intermittent every 24 hours    Neuro:  acetaminophen   Oral Liquid .. 650 milliGRAM(s) Oral every 6 hours PRN  oxyCODONE    Solution 5 milliGRAM(s) Oral every 4 hours PRN    Anticoagulation:  heparin   Injectable 5000 Unit(s) SubCutaneous every 8 hours    OTHER:  acetylcysteine 10%  Inhalation 4 milliLiter(s) Inhalation every 12 hours  albuterol/ipratropium for Nebulization 3 milliLiter(s) Nebulizer every 12 hours  amLODIPine   Tablet 10 milliGRAM(s) Oral daily  artificial tears (preservative free) Ophthalmic Solution 1 Drop(s) Right EYE every 4 hours  doxazosin 8 milliGRAM(s) Oral at bedtime  erythromycin   Ointment 1 Application(s) Right EYE every 6 hours  ofloxacin 0.3% Solution 1 Drop(s) Right EYE every 6 hours  petrolatum Ophthalmic Ointment 1 Application(s) Both EYES two times a day  polyethylene glycol 3350 17 Gram(s) Oral two times a day  sodium zirconium cyclosilicate 5 Gram(s) Oral daily    IVF:  calcium acetate 667 milliGRAM(s) Oral three times a day with meals    CULTURES:  Culture Results:   Moderate Stenotrophomonas maltophilia  Commensal iram consistent with body site (11-03 @ 16:05)  Culture Results:   No growth at 24 hours (11-03 @ 15:46)    RADIOLOGY & ADDITIONAL TESTS:      ASSESSMENT:  43 y/o PMH ?stroke/MI present to [facility] after collapsing at work. Decorticate posturing, vomiting, intubated for airway protection. 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 Transferred to [facility] for further management. s/p trach 10/14. s/p peg 10/21.      PLAN:  Neuro:  - Neuro/vitals q8hr   - pain control: Tylenol prn, oxy prn  - CTH 9/30: enlarged pontine hemorrhage, CTH 10/3: read stable, CTH 10/25 anisocoric pupils (R sluggish 6mm compared to left 3mm briskly reactive); showing mostly resolved pontine hemorrhage, continued brainstem hypodensity likely edema d/t hemorrhage, no new hemorrhage or infarct, no herniation, mild increase in size of left lateral ventricle  - vEEG (10/3-4)- negative, (10/17-10/19) - negative  - stroke core measures, stroke neuro signed off  - MRI brain w/ w/o contrast 10/12: parenchymal hemorrhage, acute/subacute R cerebellar stroke      CV:  - -160  - HTN: amlodipine 10 mg qd  - echo (9/30) EF 75%    Resp:  - tolerating trach collar 35%  - duonebs/mucomyst q12h    GI:  - Nepro TF via PEG (placed 10/21 by gen surg)  - bowel regimen, last BM 11/3    Renal:  - gabriel placed 10/29 d/t urinary retention   - FW flushes 250cc q6hrs   - hyperK 10/20: lokelma 5mg qd  - hyperPhos: phoslo 667mg TID   - Urinary retenion: Cardura 8 mg   - CKD: trend BUN/Cr  - Na goal 135-145  - renal US 10/1: echogenicity c/w chronic med renal dz, repeat 10/8: inc renal echogeicity, c/f medical renal disease w increased hydro     Endo:  - A1c 5.4    Heme:  - DVT ppx: SCDs, SQH 5000u q8h     ID:  - pan cultured 11/3 (w/o UA) , (+) sputum for stenotrophomas maltophlia   - empiric tx: vanc (11/3- ), zosyn (11/3- )  - 10/15 sputum +actinobacter baumanii, s/p unasyn (10/18-10/23)  - MRSA swab negative (10/2), sputum MSSA + , s/p 1 dose vanc (10/2), CTX (10/2 - 10/4), Ancef (10/4-10/14)     MISC:  - ophtho consult for keratitis, rec erythromycin ointment to rt eye q4hrs, ofloxacin ointment to rt eye QID, artificial tears to rt eye q2hrs, moisture chamber at bedtime    Dispo: regional, full code, pending placement and emergency medicaid     D/w Dr. D'Amico

## 2024-11-05 NOTE — CONSULT NOTE ADULT - NS ATTEND AMEND GEN_ALL_CORE FT
44M without known medical hx, found down at work, found to have large pontine hemorrhage with subarachnoid extension w edema and mass effect in setting of hypertensive emergency, transferred to [facility] for further neurosurgical care. Hospital course c/b poor neurologic recovery s/p trach-PEG, urinary retention s/p gabriel, ANIKA on CKD c/b hyperkalemia, HAP s/p amp-sulbactam (EOT 10/23). Former trach cx from 10/16 w A baumanii/nosocomialis. On 11/3, spiked fever of 101.9 with noted rise in WBC (10.9), CXR without infiltrates. Possibly w thick tracheal secretions. Was empirically started on Vanc/Piptazo to which he clinically responded, defervesced, WBC normalized.   11/3 BCx ngtd, 48 hr. Rapid RVP and SARS-CoV-2 PCR neg.  IV Vanco discontinued on 11/4 MRSA neg on MRSA/MSSA PCR. MSSA+.   SCx now reported w S. maltophilia, S- levo, bactrim, kelsea.  Medicine consulted ID requesting opinion on whether to treat Steno/not.   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  Would continue w IV Piptazo 3.375 gm x 5-7 days for VA tracheitis. F/u BCx, f/u SCx.   We will sign off for now, please call us back as needed.

## 2024-11-05 NOTE — PROGRESS NOTE ADULT - SUBJECTIVE AND OBJECTIVE BOX
SUBJECTIVE: NAEON. Patient seen this morning. Eyes open but not tracking, non-verbal.       DIET:  Diet, NPO with Tube Feed:   Tube Feeding Modality: Gastrostomy  Nepro with Carb Steady (NEPRORTH)  Total Volume for 24 Hours (mL): 1080  Total Number of Cans: 5  Continuous  Starting Tube Feed Rate mL per Hour: 45  Increase Tube Feed Rate by (mL): 10     Every 4 hours  Until Goal Tube Feed Rate (mL per Hour): 60  Tube Feed Duration (in Hours): 18  Tube Feed Start Time: 10:00  Tube Feed Stop Time: 04:00  Banatrol TF     Qty per Day:  2 (10-28-24 @ 16:11) [Active]      MEDICATIONS:  MEDICATIONS  (STANDING):  acetylcysteine 10%  Inhalation 4 milliLiter(s) Inhalation every 12 hours  albuterol/ipratropium for Nebulization 3 milliLiter(s) Nebulizer every 12 hours  amLODIPine   Tablet 10 milliGRAM(s) Oral daily  artificial tears (preservative free) Ophthalmic Solution 1 Drop(s) Right EYE every 4 hours  calcium acetate 667 milliGRAM(s) Oral three times a day with meals  doxazosin 8 milliGRAM(s) Oral at bedtime  erythromycin   Ointment 1 Application(s) Right EYE every 6 hours  heparin   Injectable 5000 Unit(s) SubCutaneous every 8 hours  ofloxacin 0.3% Solution 1 Drop(s) Right EYE every 6 hours  petrolatum Ophthalmic Ointment 1 Application(s) Both EYES two times a day  polyethylene glycol 3350 17 Gram(s) Oral two times a day  sodium zirconium cyclosilicate 5 Gram(s) Oral daily  trimethoprim  160 mG/sulfamethoxazole 800 mG 1 Tablet(s) Oral two times a day    MEDICATIONS  (PRN):  acetaminophen   Oral Liquid .. 650 milliGRAM(s) Oral every 6 hours PRN Temp greater or equal to 38C (100.4F), Mild Pain (1 - 3)      Allergies    Allergy Status Unknown    Intolerances        OBJECTIVE:  Vital Signs Last 24 Hrs  T(C): 37 (05 Nov 2024 09:25), Max: 37.8 (04 Nov 2024 16:25)  T(F): 98.6 (05 Nov 2024 09:25), Max: 100.1 (04 Nov 2024 16:25)  HR: 91 (05 Nov 2024 09:25) (90 - 101)  BP: 137/92 (05 Nov 2024 09:25) (129/87 - 141/91)  BP(mean): 105 (04 Nov 2024 16:25) (105 - 105)  RR: 20 (05 Nov 2024 09:25) (18 - 20)  SpO2: 97% (05 Nov 2024 09:25) (95% - 97%)    Parameters below as of 05 Nov 2024 09:25  Patient On (Oxygen Delivery Method): tracheostomy collar  O2 Flow (L/min): 10  O2 Concentration (%): 35  I&O's Summary    04 Nov 2024 07:01  -  05 Nov 2024 07:00  --------------------------------------------------------  IN: 0 mL / OUT: 2650 mL / NET: -2650 mL    05 Nov 2024 07:01  -  05 Nov 2024 11:43  --------------------------------------------------------  IN: 50 mL / OUT: 0 mL / NET: 50 mL        PHYSICAL EXAM:  Gen: resting comfortably, NAD  HEENT: NCAT, MMM  Neck: trach with thick secretions   CV: RRR, no m/r/g, peripheral pulses 2+  Pulm: CTAB, no increased work of breathing, no rales/rhonchi  Abd: soft, ND, NT, PEG with abd binder  Skin: warm and dry  Ext: non-tender, no edema  Neuro: Eyes spontaneously open, does not follow commands, anisocoria   Psych: unable to assess     LABS:                        11.3   4.22  )-----------( 141      ( 05 Nov 2024 05:30 )             36.6     11-05    142  |  107  |  62[H]  ----------------------------<  117[H]  4.6   |  22  |  2.82[H]    Ca    8.9      05 Nov 2024 05:30  Phos  4.7     11-05  Mg     2.4     11-05          CAPILLARY BLOOD GLUCOSE        Urinalysis Basic - ( 05 Nov 2024 05:30 )    Color: x / Appearance: x / SG: x / pH: x  Gluc: 117 mg/dL / Ketone: x  / Bili: x / Urobili: x   Blood: x / Protein: x / Nitrite: x   Leuk Esterase: x / RBC: x / WBC x   Sq Epi: x / Non Sq Epi: x / Bacteria: x        MICRODATA:    Culture - Sputum (collected 03 Nov 2024 16:05)  Source: .Sputum Sputum  Gram Stain (03 Nov 2024 22:37):    Numerous polymorphonuclear leukocytes per low power field    Numerous Squamous epithelial cells per low power field    Moderate Gram Negative Diplococci per oil power field    Few Gram Negative Rods per oil power field    Rare Gram positive cocci in pairs per oil power field  Preliminary Report (04 Nov 2024 22:06):    Moderate Stenotrophomonas maltophilia    Commensal iram consistent with body site    Culture - Blood (collected 03 Nov 2024 15:46)  Source: .Blood BLOOD  Preliminary Report (04 Nov 2024 21:01):    No growth at 24 hours    Culture - Blood (collected 03 Nov 2024 15:46)  Source: .Blood BLOOD  Preliminary Report (04 Nov 2024 21:01):    No growth at 24 hours        RADIOLOGY/OTHER STUDIES:  Reviewed

## 2024-11-05 NOTE — CONSULT NOTE ADULT - TIME BILLING
Stenotrophomonas, VA tracheitis, fever, leukocytosis.
chart review, patient exam, review of labs/imaging, management of medical conditions, discussion with consultants, documentation; excludes teaching and separately reported services

## 2024-11-05 NOTE — PROGRESS NOTE ADULT - PROBLEM SELECTOR PLAN 1
Developed sepsis 11/3 (febrile 101.9, tachycardic 110s) with increase in WBC and procalcitonin.   - source unclear, differential is broad (pulm vs , less likely intra-abd, SSTI)  - BC NTGD, SC with Stenotrophomonas maltophilia (not present on prior SC)  - WBC improved with broad spectrums (vancomycin, pip-tazo) though no other source identified (note no UC was obtained when patient developed sepsis)  - discontinue vancomycin/pip-tazo  - start TMP-SMX 1 DS BID (renally dosed) for S maltophilia HAP  - monitor creatinine/renal function, trend fever and WBC curve Developed sepsis 11/3 (febrile 101.9, tachycardic 110s) with increase in WBC and procalcitonin.   - source unclear, differential is broad (pulm vs , less likely intra-abd, SSTI)  - BC NTGD, SC with Stenotrophomonas maltophilia (not present on prior SC)  - WBC improved with broad spectrums (vancomycin, pip-tazo) though no other source identified (note no UC was obtained when patient developed sepsis)  - discontinue vancomycin given low concern for MRSA  - cont pip-tazo given improvement in WBC, still with low grade temp  - will consult ID re: further management (?treatment of S maltophilia HAP for which TMP-SMX if preferred agent vs empiric  infection)  - monitor creatinine/renal function, trend fever and WBC curve

## 2024-11-06 LAB
ANION GAP SERPL CALC-SCNC: 13 MMOL/L — SIGNIFICANT CHANGE UP (ref 5–17)
BUN SERPL-MCNC: 65 MG/DL — HIGH (ref 7–23)
CALCIUM SERPL-MCNC: 9.2 MG/DL — SIGNIFICANT CHANGE UP (ref 8.4–10.5)
CHLORIDE SERPL-SCNC: 108 MMOL/L — SIGNIFICANT CHANGE UP (ref 96–108)
CO2 SERPL-SCNC: 22 MMOL/L — SIGNIFICANT CHANGE UP (ref 22–31)
CREAT SERPL-MCNC: 2.78 MG/DL — HIGH (ref 0.5–1.3)
EGFR: 28 ML/MIN/1.73M2 — LOW
EGFR: 28 ML/MIN/1.73M2 — LOW
GLUCOSE SERPL-MCNC: 109 MG/DL — HIGH (ref 70–99)
GRAM STN FLD: ABNORMAL
HCT VFR BLD CALC: 38.4 % — LOW (ref 39–50)
HGB BLD-MCNC: 11.8 G/DL — LOW (ref 13–17)
KLEBSIELLA SP DNA BLD POS QL NAA+NON-PRB: SIGNIFICANT CHANGE UP
MAGNESIUM SERPL-MCNC: 2.4 MG/DL — SIGNIFICANT CHANGE UP (ref 1.6–2.6)
MCHC RBC-ENTMCNC: 28.4 PG — SIGNIFICANT CHANGE UP (ref 27–34)
MCHC RBC-ENTMCNC: 30.7 G/DL — LOW (ref 32–36)
MCV RBC AUTO: 92.3 FL — SIGNIFICANT CHANGE UP (ref 80–100)
METHOD TYPE: SIGNIFICANT CHANGE UP
NRBC # BLD: 0 /100 WBCS — SIGNIFICANT CHANGE UP (ref 0–0)
NRBC BLD-RTO: 0 /100 WBCS — SIGNIFICANT CHANGE UP (ref 0–0)
PHOSPHATE SERPL-MCNC: 3.9 MG/DL — SIGNIFICANT CHANGE UP (ref 2.5–4.5)
PLATELET # BLD AUTO: 178 K/UL — SIGNIFICANT CHANGE UP (ref 150–400)
POTASSIUM SERPL-MCNC: 4.5 MMOL/L — SIGNIFICANT CHANGE UP (ref 3.5–5.3)
POTASSIUM SERPL-SCNC: 4.5 MMOL/L — SIGNIFICANT CHANGE UP (ref 3.5–5.3)
RBC # BLD: 4.16 M/UL — LOW (ref 4.2–5.8)
RBC # FLD: 13.5 % — SIGNIFICANT CHANGE UP (ref 10.3–14.5)
SODIUM SERPL-SCNC: 143 MMOL/L — SIGNIFICANT CHANGE UP (ref 135–145)
WBC # BLD: 4.22 K/UL — SIGNIFICANT CHANGE UP (ref 3.8–10.5)
WBC # FLD AUTO: 4.22 K/UL — SIGNIFICANT CHANGE UP (ref 3.8–10.5)

## 2024-11-06 PROCEDURE — 99232 SBSQ HOSP IP/OBS MODERATE 35: CPT

## 2024-11-06 PROCEDURE — 99233 SBSQ HOSP IP/OBS HIGH 50: CPT

## 2024-11-06 RX ORDER — CEFEPIME 2 G/20ML
2000 INJECTION, POWDER, FOR SOLUTION INTRAVENOUS EVERY 12 HOURS
Refills: 0 | Status: COMPLETED | OUTPATIENT
Start: 2024-11-06 | End: 2024-11-12

## 2024-11-06 RX ORDER — PIPERACILLIN-TAZO-DEXTROSE,ISO 3.375G/5
4.5 IV SOLUTION, PIGGYBACK PREMIX FROZEN(ML) INTRAVENOUS EVERY 8 HOURS
Refills: 0 | Status: DISCONTINUED | OUTPATIENT
Start: 2024-11-06 | End: 2024-11-06

## 2024-11-06 RX ADMIN — ACETYLCYSTEINE 4 MILLILITER(S): 200 INHALANT RESPIRATORY (INHALATION) at 22:00

## 2024-11-06 RX ADMIN — Medication 1 DROP(S): at 15:15

## 2024-11-06 RX ADMIN — OFLOXACIN 1 DROP(S): 3 SOLUTION OPHTHALMIC at 06:07

## 2024-11-06 RX ADMIN — Medication 1 DROP(S): at 22:01

## 2024-11-06 RX ADMIN — OFLOXACIN 1 DROP(S): 3 SOLUTION OPHTHALMIC at 00:41

## 2024-11-06 RX ADMIN — Medication 667 MILLIGRAM(S): at 19:42

## 2024-11-06 RX ADMIN — ERYTHROMYCIN 1 APPLICATION(S): 5 OINTMENT OPHTHALMIC at 19:40

## 2024-11-06 RX ADMIN — Medication 1 DROP(S): at 06:08

## 2024-11-06 RX ADMIN — POLYETHYLENE GLYCOL 3350 17 GRAM(S): 17 POWDER, FOR SOLUTION ORAL at 19:57

## 2024-11-06 RX ADMIN — SODIUM ZIRCONIUM CYCLOSILICATE 5 GRAM(S): 5 POWDER, FOR SUSPENSION ORAL at 13:04

## 2024-11-06 RX ADMIN — Medication 667 MILLIGRAM(S): at 11:09

## 2024-11-06 RX ADMIN — AMLODIPINE BESYLATE 10 MILLIGRAM(S): 10 TABLET ORAL at 06:07

## 2024-11-06 RX ADMIN — Medication 1 DROP(S): at 19:39

## 2024-11-06 RX ADMIN — Medication 1 APPLICATION(S): at 06:15

## 2024-11-06 RX ADMIN — IPRATROPIUM BROMIDE AND ALBUTEROL SULFATE 3 MILLILITER(S): .5; 2.5 SOLUTION RESPIRATORY (INHALATION) at 19:38

## 2024-11-06 RX ADMIN — OFLOXACIN 1 DROP(S): 3 SOLUTION OPHTHALMIC at 13:05

## 2024-11-06 RX ADMIN — DOXAZOSIN MESYLATE 8 MILLIGRAM(S): 8 TABLET ORAL at 22:01

## 2024-11-06 RX ADMIN — HEPARIN SODIUM 5000 UNIT(S): 1000 INJECTION INTRAVENOUS; SUBCUTANEOUS at 15:14

## 2024-11-06 RX ADMIN — ERYTHROMYCIN 1 APPLICATION(S): 5 OINTMENT OPHTHALMIC at 06:16

## 2024-11-06 RX ADMIN — Medication 667 MILLIGRAM(S): at 15:14

## 2024-11-06 RX ADMIN — IPRATROPIUM BROMIDE AND ALBUTEROL SULFATE 3 MILLILITER(S): .5; 2.5 SOLUTION RESPIRATORY (INHALATION) at 06:20

## 2024-11-06 RX ADMIN — OFLOXACIN 1 DROP(S): 3 SOLUTION OPHTHALMIC at 19:41

## 2024-11-06 RX ADMIN — Medication 25 GRAM(S): at 06:07

## 2024-11-06 RX ADMIN — ACETYLCYSTEINE 4 MILLILITER(S): 200 INHALANT RESPIRATORY (INHALATION) at 11:09

## 2024-11-06 RX ADMIN — CEFEPIME 100 MILLIGRAM(S): 2 INJECTION, POWDER, FOR SOLUTION INTRAVENOUS at 19:57

## 2024-11-06 RX ADMIN — Medication 1 APPLICATION(S): at 19:42

## 2024-11-06 RX ADMIN — ERYTHROMYCIN 1 APPLICATION(S): 5 OINTMENT OPHTHALMIC at 13:05

## 2024-11-06 RX ADMIN — HEPARIN SODIUM 5000 UNIT(S): 1000 INJECTION INTRAVENOUS; SUBCUTANEOUS at 06:20

## 2024-11-06 RX ADMIN — Medication 1 DROP(S): at 11:10

## 2024-11-06 RX ADMIN — HEPARIN SODIUM 5000 UNIT(S): 1000 INJECTION INTRAVENOUS; SUBCUTANEOUS at 22:01

## 2024-11-06 NOTE — PROGRESS NOTE ADULT - SUBJECTIVE AND OBJECTIVE BOX
INFECTIOUS DISEASES CONSULT FOLLOW-UP NOTE    INTERVAL HPI/OVERNIGHT EVENTS:      ROS:   Constitutional, eyes, ENT, cardiovascular, respiratory, gastrointestinal, genitourinary, integumentary, neurological, psychiatric and heme/lymph are otherwise negative other than noted above       ANTIBIOTICS/RELEVANT:    MEDICATIONS  (STANDING):  acetylcysteine 10%  Inhalation 4 milliLiter(s) Inhalation every 12 hours  albuterol/ipratropium for Nebulization 3 milliLiter(s) Nebulizer every 12 hours  amLODIPine   Tablet 10 milliGRAM(s) Oral daily  artificial tears (preservative free) Ophthalmic Solution 1 Drop(s) Right EYE every 4 hours  calcium acetate 667 milliGRAM(s) Oral three times a day with meals  cefepime   IVPB 2000 milliGRAM(s) IV Intermittent every 12 hours  doxazosin 8 milliGRAM(s) Oral at bedtime  erythromycin   Ointment 1 Application(s) Right EYE every 6 hours  heparin   Injectable 5000 Unit(s) SubCutaneous every 8 hours  ofloxacin 0.3% Solution 1 Drop(s) Right EYE every 6 hours  petrolatum Ophthalmic Ointment 1 Application(s) Both EYES two times a day  polyethylene glycol 3350 17 Gram(s) Oral two times a day  sodium zirconium cyclosilicate 5 Gram(s) Oral daily    MEDICATIONS  (PRN):  acetaminophen   Oral Liquid .. 650 milliGRAM(s) Oral every 6 hours PRN Temp greater or equal to 38C (100.4F), Mild Pain (1 - 3)        Vital Signs Last 24 Hrs  T(C): 37.1 (06 Nov 2024 14:03), Max: 37.6 (05 Nov 2024 15:40)  T(F): 98.7 (06 Nov 2024 14:03), Max: 99.7 (05 Nov 2024 15:40)  HR: 80 (06 Nov 2024 14:03) (77 - 95)  BP: 125/82 (06 Nov 2024 14:03) (120/82 - 145/93)  BP(mean): 96 (06 Nov 2024 14:03) (96 - 96)  RR: 17 (06 Nov 2024 14:03) (16 - 18)  SpO2: 97% (06 Nov 2024 14:03) (94% - 98%)    Parameters below as of 06 Nov 2024 14:03  Patient On (Oxygen Delivery Method): tracheostomy collar  O2 Flow (L/min): 10  O2 Concentration (%): 35    11-05-24 @ 07:01  -  11-06-24 @ 07:00  --------------------------------------------------------  IN: 1250 mL / OUT: 1800 mL / NET: -550 mL    11-06-24 @ 07:01  -  11-06-24 @ 15:24  --------------------------------------------------------  IN: 25 mL / OUT: 850 mL / NET: -825 mL      PHYSICAL EXAM:  Constitutional: alert, NAD  Eyes: the sclera and conjunctiva were normal.   ENT: the ears and nose were normal in appearance.   Neck: the appearance of the neck was normal and the neck was supple.   Pulmonary: no respiratory distress and lungs were clear to auscultation bilaterally.   Heart: heart rate was normal and rhythm regular, normal S1 and S2  Vascular:. there was no peripheral edema  Abdomen: normal bowel sounds, soft, non-tender  Neurological: no focal deficits.   Psychiatric: the affect was normal        LABS:                        11.8   4.22  )-----------( 178      ( 06 Nov 2024 05:30 )             38.4     11-06    143  |  108  |  65[H]  ----------------------------<  109[H]  4.5   |  22  |  2.78[H]    Ca    9.2      06 Nov 2024 05:30  Phos  3.9     11-06  Mg     2.4     11-06        Urinalysis Basic - ( 06 Nov 2024 05:30 )    Color: x / Appearance: x / SG: x / pH: x  Gluc: 109 mg/dL / Ketone: x  / Bili: x / Urobili: x   Blood: x / Protein: x / Nitrite: x   Leuk Esterase: x / RBC: x / WBC x   Sq Epi: x / Non Sq Epi: x / Bacteria: x        MICROBIOLOGY:      RADIOLOGY & ADDITIONAL STUDIES:  Reviewed INFECTIOUS DISEASES CONSULT FOLLOW-UP NOTE    INTERVAL HPI/OVERNIGHT EVENTS:    GEORGE. Afebrile.       ROS:   EBER      ANTIBIOTICS/RELEVANT:    MEDICATIONS  (STANDING):  acetylcysteine 10%  Inhalation 4 milliLiter(s) Inhalation every 12 hours  albuterol/ipratropium for Nebulization 3 milliLiter(s) Nebulizer every 12 hours  amLODIPine   Tablet 10 milliGRAM(s) Oral daily  artificial tears (preservative free) Ophthalmic Solution 1 Drop(s) Right EYE every 4 hours  calcium acetate 667 milliGRAM(s) Oral three times a day with meals  cefepime   IVPB 2000 milliGRAM(s) IV Intermittent every 12 hours  doxazosin 8 milliGRAM(s) Oral at bedtime  erythromycin   Ointment 1 Application(s) Right EYE every 6 hours  heparin   Injectable 5000 Unit(s) SubCutaneous every 8 hours  ofloxacin 0.3% Solution 1 Drop(s) Right EYE every 6 hours  petrolatum Ophthalmic Ointment 1 Application(s) Both EYES two times a day  polyethylene glycol 3350 17 Gram(s) Oral two times a day  sodium zirconium cyclosilicate 5 Gram(s) Oral daily    MEDICATIONS  (PRN):  acetaminophen   Oral Liquid .. 650 milliGRAM(s) Oral every 6 hours PRN Temp greater or equal to 38C (100.4F), Mild Pain (1 - 3)        Vital Signs Last 24 Hrs  T(C): 37.1 (06 Nov 2024 14:03), Max: 37.6 (05 Nov 2024 15:40)  T(F): 98.7 (06 Nov 2024 14:03), Max: 99.7 (05 Nov 2024 15:40)  HR: 80 (06 Nov 2024 14:03) (77 - 95)  BP: 125/82 (06 Nov 2024 14:03) (120/82 - 145/93)  BP(mean): 96 (06 Nov 2024 14:03) (96 - 96)  RR: 17 (06 Nov 2024 14:03) (16 - 18)  SpO2: 97% (06 Nov 2024 14:03) (94% - 98%)    Parameters below as of 06 Nov 2024 14:03  Patient On (Oxygen Delivery Method): tracheostomy collar  O2 Flow (L/min): 10  O2 Concentration (%): 35    11-05-24 @ 07:01  -  11-06-24 @ 07:00  --------------------------------------------------------  IN: 1250 mL / OUT: 1800 mL / NET: -550 mL    11-06-24 @ 07:01  -  11-06-24 @ 15:24  --------------------------------------------------------  IN: 25 mL / OUT: 850 mL / NET: -825 mL      PHYSICAL EXAM:  Constitutional: NAD  Eyes: the sclera and conjunctiva were normal.   ENT: the ears and nose were normal in appearance.   Neck: +trach  Pulmonary: no respiratory distress and lungs were clear to auscultation anteriorly   Heart: heart rate was normal and rhythm regular, normal S1 and S2  Vascular: there was no peripheral edema  Abdomen:  soft, +PEG  Neurological: eyes open but not tracking, does not follow commands   R PIV without erythema or edema       LABS:                        11.8   4.22  )-----------( 178      ( 06 Nov 2024 05:30 )             38.4     11-06    143  |  108  |  65[H]  ----------------------------<  109[H]  4.5   |  22  |  2.78[H]    Ca    9.2      06 Nov 2024 05:30  Phos  3.9     11-06  Mg     2.4     11-06        Urinalysis Basic - ( 06 Nov 2024 05:30 )    Color: x / Appearance: x / SG: x / pH: x  Gluc: 109 mg/dL / Ketone: x  / Bili: x / Urobili: x   Blood: x / Protein: x / Nitrite: x   Leuk Esterase: x / RBC: x / WBC x   Sq Epi: x / Non Sq Epi: x / Bacteria: x        MICROBIOLOGY:    Culture - Sputum (collected 11-03-24 @ 16:05)  Source: .Sputum Sputum  Gram Stain (11-03-24 @ 22:37):    Numerous polymorphonuclear leukocytes per low power field    Numerous Squamous epithelial cells per low power field    Moderate Gram Negative Diplococci per oil power field    Few Gram Negative Rods per oil power field    Rare Gram positive cocci in pairs per oil power field  Final Report (11-05-24 @ 16:32):    Moderate Stenotrophomonas maltophilia    Commensal iram consistent with body site  Organism: Stenotrophomonas maltophilia  Stenotrophomonas maltophilia (11-05-24 @ 16:32)  Organism: Stenotrophomonas maltophilia (11-05-24 @ 16:32)      -  Minocycline: S      Method Type: KB  Organism: Stenotrophomonas maltophilia (11-05-24 @ 16:32)      -  Levofloxacin: S <=0.5      Method Type: DEVENDRA      -  Trimethoprim/Sulfamethoxazole: S <=0.5/9.5    Culture - Blood (collected 11-03-24 @ 15:46)  Source: .Blood BLOOD  Preliminary Report (11-05-24 @ 21:01):    No growth at 48 Hours    Culture - Blood (collected 11-03-24 @ 15:46)  Source: .Blood BLOOD  Gram Stain (11-06-24 @ 11:32):    Growth in aerobic bottle: Gram Negative Rods  Preliminary Report (11-06-24 @ 11:33):    Growth in aerobic bottle: Gram Negative Rods    Direct identification is available within approximately 3-5    hours either by Blood Panel Multiplexed PCR or Direct    MALDI-TOF. Details: [email or web address]  Organism: Blood Culture PCR (11-06-24 @ 12:53)  Organism: Blood Culture PCR (11-06-24 @ 12:53)      Method Type: PCR      -  Klebsiella aerogenes: Detec        RADIOLOGY & ADDITIONAL STUDIES:  Reviewed

## 2024-11-06 NOTE — CHART NOTE - NSCHARTNOTEFT_GEN_A_CORE
Admitting Diagnosis:   Patient is a 44y old  Male who presents with a chief complaint of brain stem bleed     PAST MEDICAL & SURGICAL HISTORY:  Acute myocardial infarction  CVA (cerebral vascular accident)    Current Nutrition Order: Diet, NPO with Tube Feed:   Tube Feeding Modality: Gastrostomy  Nepro with Carb Steady (NEPRORTH)  Total Volume for 24 Hours (mL): 1080  Total Number of Cans: 5  Continuous  Starting Tube Feed Rate {mL per Hour}: 45  Increase Tube Feed Rate by (mL): 10     Every 4 hours  Until Goal Tube Feed Rate (mL per Hour): 60  Tube Feed Duration (in Hours): 18  Tube Feed Start Time: 10:00  Tube Feed Stop Time: 04:00  Banatrol TF     Qty per Day:  2 (10-28-24 @ 16:11) [Active]     PO Intake: Good (%) [   ]  Fair (50-75%) [   ] Poor (<25%) [   ] ... NPO with tube feeding     GI Issues: WDL; last bowel movement documented 11/5; receiving Banatrol via PEG, no loose stools noted    Pain: non-verbal indicators absent     Skin Integrity: WDL; 1+ L/R hand edema; PEG in place; cynthia score = 9, no pressure injuries documented     I&O's Summary    05 Nov 2024 07:01  -  06 Nov 2024 07:00  --------------------------------------------------------  IN: 1250 mL / OUT: 1800 mL / NET: -550 mL    06 Nov 2024 07:01  -  06 Nov 2024 21:13  --------------------------------------------------------  IN: 1065 mL / OUT: 850 mL / NET: 215 mL      Labs:   11-06    143  |  108  |  65[H]  ----------------------------<  109[H]  4.5   |  22  |  2.78[H]    Ca    9.2      06 Nov 2024 05:30  Phos  3.9     11-06  Mg     2.4     11-06      Medications:  MEDICATIONS  (STANDING):  acetylcysteine 10%  Inhalation 4 milliLiter(s) Inhalation every 12 hours  albuterol/ipratropium for Nebulization 3 milliLiter(s) Nebulizer every 12 hours  amLODIPine   Tablet 10 milliGRAM(s) Oral daily  artificial tears (preservative free) Ophthalmic Solution 1 Drop(s) Right EYE every 4 hours  calcium acetate 667 milliGRAM(s) Oral three times a day with meals  cefepime   IVPB 2000 milliGRAM(s) IV Intermittent every 12 hours  doxazosin 8 milliGRAM(s) Oral at bedtime  erythromycin   Ointment 1 Application(s) Right EYE every 6 hours  heparin   Injectable 5000 Unit(s) SubCutaneous every 8 hours  ofloxacin 0.3% Solution 1 Drop(s) Right EYE every 6 hours  petrolatum Ophthalmic Ointment 1 Application(s) Both EYES two times a day  polyethylene glycol 3350 17 Gram(s) Oral two times a day  sodium zirconium cyclosilicate 5 Gram(s) Oral daily    MEDICATIONS  (PRN):  acetaminophen   Oral Liquid .. 650 milliGRAM(s) Oral every 6 hours PRN Temp greater or equal to 38C (100.4F), Mild Pain (1 - 3)    Dosing Anthropometrics:   Height for BMI (FEET)	5 Feet  Height for BMI (INCHES)	8 Inch(s)  Height for BMI (CM)	172.72 Centimeter(s)  Weight for BMI (lbs)	176.4 lb  Weight for BMI (kg)	80 kg  Body Mass Index	              26.8  ideal body weight:               154 pounds, pt is ~114% of ideal body weight    Weight Change: weight above as per RD note from 10/4; dosing weight of 100kg/ 220 pounds documented from 9/30 but ? accuracy, will continue to use weight of 80kg / 176 pounds. Would strongly recommend that nursing obtain new weight and updated weights weekly prn. RD to monitor trends.     Estimated energy needs:  Estimated Energy Needs Weight (lbs)	176 lb  Estimated Energy Needs Weight (kg)	80 kg  Estimated Energy Needs From (christiana/kg) 22  Estimated Energy Needs To (christiana/kg)	27  Estimated Energy Needs Calculated From (christiana/kg) 1760  Estimated Energy Needs Calculated To (christiana/kg)	2160    Estimated Protein Needs Weight (lbs)	176 lb  Estimated Protein Needs Weight (kg)	80 kg  Estimated Protein Needs From (g/kg)	1.0  Estimated Protein Needs To (g/kg)	1.4  Estimated Protein Needs Calculated From (g/kg) 80   Estimated Protein Needs Calculated To (g/kg)	112    Estimated Fluid Needs Weight (lbs)	176 lb  Estimated Fluid Needs Weight (kg)	80 kg  Estimated Fluid Needs From (ml/kg)	20  Estimated Fluid Needs To (ml/kg)	25  Estimated Fluid Needs Calculated From (ml/kg)	1600  Estimated Fluid Needs Calculated To (ml/kg)	2000    Other Calculations: IBW = 114%, between % IBW and no longer in ICU setting, therefore, used actual body weight for all calculations. Needs adjusted for age, clinical course, vented.     Subjective: This is a 44 year old male, unknown past medical history (reported stroke and MI by coworkers) presented to [facility] with AMS, Pt was working at [employer] when he was found down by coworkers. EMS called and pt brought to [facility] ED. Intubated, sedated, started on cardene for SBPs in 200s. CT head showed brain stem bleed. Transferred to NSICU for further management.    Remains NPO with tube feeds via PEG; Nepro 1.8 tube feed running at goal rate of 60 cc/hr, continuing to meet 100% estimated nutrient needs. Noted pt receiving Banatrol BID via PEG but RN denied any episodes of diarrhea and also on Miralax, if diarrhea/ loose stools resolved, would recommend to discontinue Banatrol use. RDN to remain available; see nutrition recommendations below.     Previous Nutrition Diagnosis: Inadequate Oral Intake related to inability to meet nutritional demands via PO as evidenced by NPO with tube feedings    Active [ x ]  Resolved [   ]    Goal: Pt to consistently meet at least >/= 75% of estimated energy needs via tolerated route that is consistent with goals of care during hospital stay    Recommendations:  1. NPO with tube feedings, continue with current tube feed regimen as below to continue to meet 100% estimated needs:   **Nepro 1.8 via PEG at goal rate of 60mL/hour x 18 hours, to provide 1080mL total volume from tube feeding, 1912 calories, 87g protein, 785mL free water; this meets ~24 calories/kg, 1.1g protein/kg; consider 190mL free water flush every 4 hours  2. Would strongly recommend to consider discontinuing Banatrol if diarrhea resolved  3. Maintain aspiration precautions at all times; monitor for signs/symptoms of intolerance*  4. Monitor weights (weekly), GI function, skin integrity; electrolytes, BMP, glucose, CBC per MD discretion **renal indices**  5. Pain and bowel regimen per medical team  6. Align nutrition with goals of care at all times    Risk Level: High [ x  ] Moderate [   ] Low [   ]

## 2024-11-06 NOTE — PROGRESS NOTE ADULT - SUBJECTIVE AND OBJECTIVE BOX
SUBJECTIVE: NAEON. Patient seen this morning with nurse at bedside. Patient with eyes open, not tracking, non-verbal. Last BM yesterday.       DIET:  Diet, NPO with Tube Feed:   Tube Feeding Modality: Gastrostomy  Nepro with Carb Steady (NEPRORTH)  Total Volume for 24 Hours (mL): 1080  Total Number of Cans: 5  Continuous  Starting Tube Feed Rate mL per Hour: 45  Increase Tube Feed Rate by (mL): 10     Every 4 hours  Until Goal Tube Feed Rate (mL per Hour): 60  Tube Feed Duration (in Hours): 18  Tube Feed Start Time: 10:00  Tube Feed Stop Time: 04:00  Banatrol TF     Qty per Day:  2 (10-28-24 @ 16:11) [Active]      MEDICATIONS:  MEDICATIONS  (STANDING):  acetylcysteine 10%  Inhalation 4 milliLiter(s) Inhalation every 12 hours  albuterol/ipratropium for Nebulization 3 milliLiter(s) Nebulizer every 12 hours  amLODIPine   Tablet 10 milliGRAM(s) Oral daily  artificial tears (preservative free) Ophthalmic Solution 1 Drop(s) Right EYE every 4 hours  calcium acetate 667 milliGRAM(s) Oral three times a day with meals  doxazosin 8 milliGRAM(s) Oral at bedtime  erythromycin   Ointment 1 Application(s) Right EYE every 6 hours  heparin   Injectable 5000 Unit(s) SubCutaneous every 8 hours  ofloxacin 0.3% Solution 1 Drop(s) Right EYE every 6 hours  petrolatum Ophthalmic Ointment 1 Application(s) Both EYES two times a day  piperacillin/tazobactam IVPB.. 4.5 Gram(s) IV Intermittent every 8 hours  polyethylene glycol 3350 17 Gram(s) Oral two times a day  sodium zirconium cyclosilicate 5 Gram(s) Oral daily    MEDICATIONS  (PRN):  acetaminophen   Oral Liquid .. 650 milliGRAM(s) Oral every 6 hours PRN Temp greater or equal to 38C (100.4F), Mild Pain (1 - 3)      Allergies    Allergy Status Unknown    Intolerances        OBJECTIVE:  Vital Signs Last 24 Hrs  T(C): 36.9 (06 Nov 2024 09:15), Max: 37.6 (05 Nov 2024 13:26)  T(F): 98.5 (06 Nov 2024 09:15), Max: 99.7 (05 Nov 2024 15:40)  HR: 81 (06 Nov 2024 09:15) (77 - 95)  BP: 124/84 (06 Nov 2024 09:15) (120/82 - 145/93)  BP(mean): --  RR: 16 (06 Nov 2024 09:15) (16 - 19)  SpO2: 97% (06 Nov 2024 09:15) (94% - 98%)    Parameters below as of 06 Nov 2024 09:15  Patient On (Oxygen Delivery Method): tracheostomy collar  O2 Flow (L/min): 10  O2 Concentration (%): 35  I&O's Summary    05 Nov 2024 07:01  -  06 Nov 2024 07:00  --------------------------------------------------------  IN: 1250 mL / OUT: 1800 mL / NET: -550 mL    06 Nov 2024 07:01  -  06 Nov 2024 12:10  --------------------------------------------------------  IN: 25 mL / OUT: 450 mL / NET: -425 mL        PHYSICAL EXAM:  Gen: resting comfortably, NAD  HEENT: NCAT, MMM  Neck: trach with thick secretions   CV: RRR, no m/r/g, peripheral pulses 2+  Pulm: CTAB, no increased work of breathing, no rales/rhonchi  Abd: soft, ND, NT, PEG with abd binder  Skin: warm and dry  Ext: non-tender, no edema  Neuro: Eyes spontaneously open, does not follow commands, anisocoria   Psych: unable to assess     LABS:                        11.8   4.22  )-----------( 178      ( 06 Nov 2024 05:30 )             38.4     11-06    143  |  108  |  65[H]  ----------------------------<  109[H]  4.5   |  22  |  2.78[H]    Ca    9.2      06 Nov 2024 05:30  Phos  3.9     11-06  Mg     2.4     11-06          CAPILLARY BLOOD GLUCOSE        Urinalysis Basic - ( 06 Nov 2024 05:30 )    Color: x / Appearance: x / SG: x / pH: x  Gluc: 109 mg/dL / Ketone: x  / Bili: x / Urobili: x   Blood: x / Protein: x / Nitrite: x   Leuk Esterase: x / RBC: x / WBC x   Sq Epi: x / Non Sq Epi: x / Bacteria: x        MICRODATA:    Culture - Sputum (collected 03 Nov 2024 16:05)  Source: .Sputum Sputum  Gram Stain (03 Nov 2024 22:37):    Numerous polymorphonuclear leukocytes per low power field    Numerous Squamous epithelial cells per low power field    Moderate Gram Negative Diplococci per oil power field    Few Gram Negative Rods per oil power field    Rare Gram positive cocci in pairs per oil power field  Final Report (05 Nov 2024 16:32):    Moderate Stenotrophomonas maltophilia    Commensal iram consistent with body site  Organism: Stenotrophomonas maltophilia  Stenotrophomonas maltophilia (05 Nov 2024 16:32)  Organism: Stenotrophomonas maltophilia (05 Nov 2024 16:32)  Organism: Stenotrophomonas maltophilia (05 Nov 2024 16:32)    Culture - Blood (collected 03 Nov 2024 15:46)  Source: .Blood BLOOD  Preliminary Report (05 Nov 2024 21:01):    No growth at 48 Hours    Culture - Blood (collected 03 Nov 2024 15:46)  Source: .Blood BLOOD  Gram Stain (06 Nov 2024 11:32):    Growth in aerobic bottle: Gram Negative Rods  Preliminary Report (06 Nov 2024 11:33):    Growth in aerobic bottle: Gram Negative Rods    Direct identification is available within approximately 3-5    hours either by Blood Panel Multiplexed PCR or Direct    MALDI-TOF. Details: [email or web address]        RADIOLOGY/OTHER STUDIES:  Reviewed

## 2024-11-06 NOTE — PROGRESS NOTE ADULT - PROBLEM SELECTOR PLAN 1
Developed sepsis 11/3 (febrile 101.9, tachycardic 110s) with increase in WBC and procalcitonin. Source HAP vs tracheitis vs ? (no culture data). Low c/f SSTI, intra-abdominal.   - SC positive for Stenotrophomonas maltophilia; BC 11/3 now positive for GNR in 1/2 sets  - resolution of sepsis and defervescence while on vanc/pip-tazo  - evaluated by ID and case d/w Dr. 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

## 2024-11-06 NOTE — PROGRESS NOTE ADULT - ASSESSMENT
44 YOF with unclear PMH (?stroke, MI) who was found down at work, intubated for airway protection and found to have acute parenchymal hemorrhage within nabil with mass effect (+ acute/subacute right cerebellar infarct) in setting of hypertensive emergency, transferred to [facility] for further neurosurgical care. Hospital course c/b poor neurologic recovery s/p trach-PEG, AUR s/p gabriel, ANIKA on CKD c/b hyperkalemia, HAP s/p amp-sulbactam (EOT 10/23). Developed fever 101.9 with increase in WBC (10.9) and elevated procal (0.66) on 11/3- was pancultured (without UA), CXR was wnl. Started on vanc and zosyn on 11/3. Vanc discontinued on 11/4 given low concern for MRSA. Defervesced with resolution of leukocytosis. Bcxs ngtd, MRSA PCR negative, MSSA positive, RVP/COVID negative, Sputum culture with stenotrophomonas and commensal iram. Source of fever unclear -- ?HAP vs tracheitis vs , less likely GI (no diarrhea) or SSTI. Suspect steno that grew in sputum culture is colonizer; patient clinically improved without steno coverage. Patient has not had increased O2 requirements and CXR unremarkable so overall low suspicion for HAP but given thickened tracheal secretions on exam and improvement with zosyn, ok with continuing for short course     Suggest:  -f/u bcxs --growing klebsiella aerogenes (source possibly  vs GI)  -send surveillance blood culture today  -check CT A/P to further eval for source   -stop zosyn as Kleb aerogenes is amp C    -start cefepime 2g IV Q12 (renally dosed)  -duration of antibiotics will likely at least 7 days     Case d/w primary team.  Final recommendation pending attending note.    Tati Ambriz, Infectious Diseases PA  Please reach out for any questions 9 am-5pm. For evenings and weekends, please call the ID physician on call.  Work cell: [phone]

## 2024-11-06 NOTE — PROGRESS NOTE ADULT - SUBJECTIVE AND OBJECTIVE BOX
HPI:  Unknown age male, unknown past medical history (reported stroke and MI by coworkers) presented to [facility] with AMS, Pt was working at [email or web address] when he was found down by coworkers. EMS called and pt brought to [facility] ED. Intubated, sedated, started on cardene for SBPs in 200s. CT head showed brain stem bleed. Transferred to NSICU for further management.  (30 Sep 2024 12:55)    INTERVAL EVENTS:  GEORGE     HOSPITAL COURSE:  9/30: transferred from [facility]. A line placed. Versed dc'd. Cy Rader at bedside, states pt has family in [place], cannot confirm medications or PMH other than stroke and MI. 250cc bolus 3% given. LR switched to NS. hydralazine 25q8 started, 3% started, switched propofol to precedex   10/1: stability CTH done. Added labetalol, started TF. Palliative consulted. ethics consulted to determine surrogate. febrile 103, pan cx sent  10/2: BD 2, GEORGE overnight. TF resumed. Desatt'd to 80s, FiO2 inc. to 50. Fentanyl given, ABG, CXR ordered. Maxxed on precedex, started on propofol for DARIEN -4 - -5. Precedex dc'd. Duonebs, mucomyst, hypertonic added. 3% dc'd. Cardene dc'd. Start vanc/CTX. Increased labetalol 200q8. MRSA negative, dc'd vanc. ETT pulled back 2cm x 2, good positioning after confirmatory chest xray. Ethics attempting to establish HCP with family. Na 159, starting FW 250q6 for range 150-155.   10/3: BD3, GEORGE o/n, neuro stable. Na elevating, FW increased to 300q6. Dc'd bowel reg for diarrhea. vEEG started. SQH 5000q8 tonight.   10/4: BD 4, albumn bolus, incr. LR to 80 2/2 incr. in Cr, LR to 100cc/hr for uptrending Cr. Started 7% hypersal for 48hrs and SL atropine for inline/oral thick secretions. Dc'd CTX and started ancef for MSSA in the sputum. Nephrology consulted for CKD, f/u recs. SBP 170s, given hydralazine 10mg IVP.   10/5: BD5, o/n 10mg IVP hydralazine given for SBP 170s and started on hydralazine 25q8 via OGT. 10mg IV push labetalol for SBP > 160s. RT placed for diarrhea.   10/6: BD6, o/n FW increased to 350q4 per nephrology recs. IV tylenol for temp 100.6, SBp 160s presumed uncomfortable.   10/7: BD7, overnight pancultured for temp 101.8F.   10/8: BD8. GEORGE. Cr bumped. decreased LR to 75cc/hr. Adding simethicone ATC. incr hydralazine 50mgTID. Incr labetalol 300mgTID. Na 145, decreased FWF to 250q6. Start precedex. FENa consistent with intrinsic kidney injury. Pend repeat renal US. Retaining up to 1.3L, bladder scans q6, straight cath PRN  10/9: BD 9. GEORGE overnight. Neuro stable. abd xray for distention w non-specific gas pattern, OGT to LIWS for morning. duonebs/mucomyst to q8 for improving secretions. Changed tube feeds to Jevity 1.5 20cc/hr, low rate due to abdominal distention, nepro dense and more difficult to digest. Tolerating CPAP, confirmed by ABG.   10/10: BD 10. GEORGE overnight. Neuro stable. (+) gabriel for urinary retention on bladder scan. inc TF to goal rate of 40cc/hr. family leaning toward pursuing trach/PEG. 1/2 amp for FS 81.   10/11: BD 11. GEORGE overnight. Neuro stable. Trach/PEG consults placed.   10/12: BD 12. GEORGE overnight. Neuro stable. MRI brain complete.   10/13: BD 13. Increase flomax. Hold SQH after PM dose for trach tm. IVL.   10/14: BD 14. GEORGE overnight, remains on AC/VC. Gabriel placed for urinary retention. Dc'd free water.  S/p trach with pulm. NGT placed and CXR confirmed in good position.   10/15: BD 15, GEORGE ovn. resumed feeds. spiked 101, pan cx sent.   10/16: BD 16. GEORGE ovn. Lokelma 5mg for K+ 5.4. Started vanc q 24/zosyn for empiric PNA coverage, IVF to 100/hr. PEG held for fever.   10/17: BD 17,  ordered serum osm and urine osm for am. Started sinemet for neurostimulation. Increased cardura to 0.8. Started FW 100q4, dc'd IVF. MRSA negative, dc'd vanc. NGT replaced d/t coiling.   10/18: BD 18, GEORGE overnight, neuro stable. Amantadine added for neurostim. zosyn changed to unasyn for acinetobacter baumannii, failed TOV and required SC  10/19: BD 19, GEORGE ovn. cardura 2mg added for retention. labetalol decreased 200q8, hydralazine decreased 25q8. Gabriel replaced.   10/20: BD20, GEORGE overnight. NGT dislodged, replaced. PEG tomorrow w/ gen surg, FW increased to 150q4 and labetalol decreased to 100q8, lokelma given for hyperkalemia.   10/21: BD 21. POD0 PEG placement with Gen surg. decr labetolol to 50q8, incr. cardura to 0.4, started lokelma and phoslo, dc gabriel POD0 PEG placement with Gen surg.  10/22: BD 22. Plan to start TF today via PEG. dc labetalol, Following ophtho recs. Increased apnea settings - found to be in cheyne-moe respiration. CPAP 5/5.  10/23: BD 23. hydralazine d/c'd, trach collar trial today. Rectal tube placed at 6am.  10/24: BD24, o/n lokelma held due to diarrhea. Free water 100q6 resumed. dc'd tamsulosin, amantadine. Incr'd cardura to 8mg qhs. Dc'd FW. Switched jevity to nepro. gabriel placed for high urine output. Started SL atropine for oral secretions. Dc'd free water.  10/25: BD25, o/n decreased suctioning requirements to > q4hrs, GEORGE. Cr improving, cont phoslo, lokelma held at this time. Gabriel placed yest, cont. Tolerating trach collar. Given 500cc plasmalyte bolus for ANIKA. Dc'd sinemet.   10/26: BD26, o/n resumed lokelma 5mg daily and resumed 100cc free water q6hrs. Change in neuro status with new right pupillary dilation with anisocoria (right pupil 6mm fixed and left pupil 3mm briskly reactive). Given 23.4% NaCl bullet, taken for emergent CTH showing mostly resolved pontine hemorrhage, continued brainstem hypodensity likely edema d/t hemorrhage, no new hemorrhage or infarct, no herniation, mild increase in size of left lateral ventricle. Vitals remaine stable. Na goal > 140.   10/27: BD27, o/n GEORGE.Neuro stable. Pend stepdown with airway bed.   10/28: BD 28. GEORGE overnight. Neuro stable. Miralax ordered. Gabriel removed, pending TOV.  10/29: BD 29. GEORGE o/n. Given 2L NS over 8 hrs for increased BUN/Cr ratio. Gabriel placed for frequent straight cath.   10/30: BD 30.   10/31: BD 31. GEORGE overnight. Na 149, increased free water to 200q6. 1L NS for uptrending BUN.   11/1: BD 32. GEORGE overnight. Given 1L NS for dehydration. Na 146, increased FW to 250q6.   11/2: BD 33 GEORGE overnight, neuro stable, given 500cc bolus for net negative status and tachycardia   11/3: BD 34, GEORGE overnight, neuro stable. Patient remains tachycardic, EKG showing sinus tachycardia, given additional 500cc NS bolus. Febrile to 101.9F, pan cultured (without UA), CXR WNL, given tylenol.   11/4: BD 35, GEORGE overnight, neuro stable. Given 1L NS for tachycardia. sputum (+) for stenotropohomas maltophilia.   11/5: BD 36 GEORGE overnight, neuro stable. Vancomycin dc'd. Chest PT BID. ID consulted, cont zosyn.  11/6: BD 37.       Vital Signs Last 24 Hrs  T(C): 37.1 (06 Nov 2024 00:00), Max: 37.7 (05 Nov 2024 06:00)  T(F): 98.7 (06 Nov 2024 00:00), Max: 99.8 (05 Nov 2024 06:00)  HR: 95 (06 Nov 2024 00:00) (86 - 98)  BP: 140/90 (06 Nov 2024 00:00) (121/82 - 145/93)  BP(mean): --  RR: 18 (06 Nov 2024 00:00) (17 - 20)  SpO2: 97% (06 Nov 2024 00:00) (94% - 97%)    Parameters below as of 06 Nov 2024 00:00  Patient On (Oxygen Delivery Method): tracheostomy collar        I&O's Summary    04 Nov 2024 07:01  -  05 Nov 2024 07:00  --------------------------------------------------------  IN: 0 mL / OUT: 2650 mL / NET: -2650 mL    05 Nov 2024 07:01  -  06 Nov 2024 00:50  --------------------------------------------------------  IN: 1250 mL / OUT: 1250 mL / NET: 0 mL        PHYSICAL EXAM:  General: NAD, pt laying in bed  HEENT: PERRL 4mm, rt eye injected/erythematous: but improving from previous  Cardiovascular: RRR, S1, S2  Respiratory: breathing non-labored on trach collar, chest rise symmetric  GI: abd soft, NTND peg tube in place   : gabriel in place  Neuro: A&Ox0, eyes track vertically, not following commands otherwise, small spont movements of RUE, RLE; LUE otherwise 0/5;  BLE withdraw to noxious stim  Extremities: warm and well perfused      LABS:                        11.3   4.22  )-----------( 141      ( 05 Nov 2024 05:30 )             36.6     11-05    142  |  107  |  62[H]  ----------------------------<  117[H]  4.6   |  22  |  2.82[H]    Ca    8.9      05 Nov 2024 05:30  Phos  4.7     11-05  Mg     2.4     11-05        Urinalysis Basic - ( 05 Nov 2024 05:30 )    Color: x / Appearance: x / SG: x / pH: x  Gluc: 117 mg/dL / Ketone: x  / Bili: x / Urobili: x   Blood: x / Protein: x / Nitrite: x   Leuk Esterase: x / RBC: x / WBC x   Sq Epi: x / Non Sq Epi: x / Bacteria: x          CAPILLARY BLOOD GLUCOSE          Drug Levels: [] N/A    CSF Analysis: [] N/A      Allergies    Allergy Status Unknown    Intolerances      MEDICATIONS:  Antibiotics:  piperacillin/tazobactam IVPB.. 4.5 Gram(s) IV Intermittent every 8 hours    Neuro:  acetaminophen   Oral Liquid .. 650 milliGRAM(s) Oral every 6 hours PRN    Anticoagulation:  heparin   Injectable 5000 Unit(s) SubCutaneous every 8 hours    OTHER:  acetylcysteine 10%  Inhalation 4 milliLiter(s) Inhalation every 12 hours  albuterol/ipratropium for Nebulization 3 milliLiter(s) Nebulizer every 12 hours  amLODIPine   Tablet 10 milliGRAM(s) Oral daily  artificial tears (preservative free) Ophthalmic Solution 1 Drop(s) Right EYE every 4 hours  doxazosin 8 milliGRAM(s) Oral at bedtime  erythromycin   Ointment 1 Application(s) Right EYE every 6 hours  ofloxacin 0.3% Solution 1 Drop(s) Right EYE every 6 hours  petrolatum Ophthalmic Ointment 1 Application(s) Both EYES two times a day  polyethylene glycol 3350 17 Gram(s) Oral two times a day  sodium zirconium cyclosilicate 5 Gram(s) Oral daily    IVF:  calcium acetate 667 milliGRAM(s) Oral three times a day with meals    CULTURES:  Culture Results:   Moderate Stenotrophomonas maltophilia  Commensal iram consistent with body site (11-03 @ 16:05)  Culture Results:   No growth at 48 Hours (11-03 @ 15:46)    RADIOLOGY & ADDITIONAL TESTS:      ASSESSMENT:  45 y/o PMH ?stroke/MI present to [facility] after collapsing at work. Decorticate posturing, vomiting, intubated for airway protection. 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 Transferred to [facility] for further management. s/p trach 10/14. s/p peg 10/21.      PLAN:  Neuro:  - Neuro/vitals q8hr   - pain control: Tylenol prn, oxy prn  - CTH 9/30: enlarged pontine hemorrhage, CTH 10/3: read stable, CTH 10/25 anisocoric pupils (R sluggish 6mm compared to left 3mm briskly reactive); showing mostly resolved pontine hemorrhage, continued brainstem hypodensity likely edema d/t hemorrhage, no new hemorrhage or infarct, no herniation, mild increase in size of left lateral ventricle  - vEEG (10/3-4)- negative, (10/17-10/19) - negative  - stroke core measures, stroke neuro signed off  - MRI brain w/ w/o contrast 10/12: parenchymal hemorrhage, acute/subacute R cerebellar stroke      CV:  - -160  - HTN: amlodipine 10 mg qd  - echo (9/30) EF 75%    Resp:  - tolerating trach collar 35%  - duonebs/mucomyst q12h  - chest PT vest BID    GI:  - Nepro TF via PEG (placed 10/21 by gen surg)  - bowel regimen, last BM 11/3    Renal:  - gabriel placed 10/29 d/t urinary retention   - FW flushes 250cc q6hrs   - hyperK 10/20: lokelma 5mg qd  - hyperPhos: phoslo 667mg TID   - Urinary retenion: Cardura 8 mg   - CKD: trend BUN/Cr  - Na goal 135-145  - renal US 10/1: echogenicity c/w chronic med renal dz, repeat 10/8: inc renal echogeicity, c/f medical renal disease w increased hydro     Endo:  - A1c 5.4    Heme:  - DVT ppx: SCDs, SQH 5000u q8h     ID:  - pan cultured 11/3 (w/o UA) , (+) sputum for stenotrophomas maltophlia   - ID following  - empiric tx: zosyn (11/3- , s/p vanc  (11/3- 11/5)  - 10/15 sputum +actinobacter baumanii, s/p unasyn (10/18-10/23)  - MRSA swab negative (10/2), sputum MSSA + , s/p 1 dose vanc (10/2), CTX (10/2 - 10/4), Ancef (10/4-10/14)     MISC:  - ophtho consult for keratitis, rec erythromycin ointment to rt eye q4hrs, ofloxacin ointment to rt eye QID, artificial tears to rt eye q2hrs, moisture chamber at bedtime    Dispo: regional, full code, pending placement and emergency medicaid     D/w Dr. D'Amico

## 2024-11-07 DIAGNOSIS — R78.81 BACTEREMIA: ICD-10-CM

## 2024-11-07 LAB
ANION GAP SERPL CALC-SCNC: 16 MMOL/L — SIGNIFICANT CHANGE UP (ref 5–17)
BUN SERPL-MCNC: 63 MG/DL — HIGH (ref 7–23)
CALCIUM SERPL-MCNC: 9.5 MG/DL — SIGNIFICANT CHANGE UP (ref 8.4–10.5)
CHLORIDE SERPL-SCNC: 104 MMOL/L — SIGNIFICANT CHANGE UP (ref 96–108)
CO2 SERPL-SCNC: 19 MMOL/L — LOW (ref 22–31)
CREAT SERPL-MCNC: 2.48 MG/DL — HIGH (ref 0.5–1.3)
EGFR: 32 ML/MIN/1.73M2 — LOW
EGFR: 32 ML/MIN/1.73M2 — LOW
GLUCOSE SERPL-MCNC: 123 MG/DL — HIGH (ref 70–99)
HCT VFR BLD CALC: 37.5 % — LOW (ref 39–50)
HGB BLD-MCNC: 11.6 G/DL — LOW (ref 13–17)
MAGNESIUM SERPL-MCNC: 2.2 MG/DL — SIGNIFICANT CHANGE UP (ref 1.6–2.6)
MCHC RBC-ENTMCNC: 28.2 PG — SIGNIFICANT CHANGE UP (ref 27–34)
MCHC RBC-ENTMCNC: 30.9 G/DL — LOW (ref 32–36)
MCV RBC AUTO: 91 FL — SIGNIFICANT CHANGE UP (ref 80–100)
NRBC # BLD: 0 /100 WBCS — SIGNIFICANT CHANGE UP (ref 0–0)
NRBC BLD-RTO: 0 /100 WBCS — SIGNIFICANT CHANGE UP (ref 0–0)
PHOSPHATE SERPL-MCNC: 4.9 MG/DL — HIGH (ref 2.5–4.5)
PLATELET # BLD AUTO: 167 K/UL — SIGNIFICANT CHANGE UP (ref 150–400)
POTASSIUM SERPL-MCNC: 4.8 MMOL/L — SIGNIFICANT CHANGE UP (ref 3.5–5.3)
POTASSIUM SERPL-SCNC: 4.8 MMOL/L — SIGNIFICANT CHANGE UP (ref 3.5–5.3)
RBC # BLD: 4.12 M/UL — LOW (ref 4.2–5.8)
RBC # FLD: 13.4 % — SIGNIFICANT CHANGE UP (ref 10.3–14.5)
SODIUM SERPL-SCNC: 139 MMOL/L — SIGNIFICANT CHANGE UP (ref 135–145)
WBC # BLD: 6.51 K/UL — SIGNIFICANT CHANGE UP (ref 3.8–10.5)
WBC # FLD AUTO: 6.51 K/UL — SIGNIFICANT CHANGE UP (ref 3.8–10.5)

## 2024-11-07 PROCEDURE — 99232 SBSQ HOSP IP/OBS MODERATE 35: CPT

## 2024-11-07 RX ADMIN — POLYETHYLENE GLYCOL 3350 17 GRAM(S): 17 POWDER, FOR SOLUTION ORAL at 05:01

## 2024-11-07 RX ADMIN — Medication 1 DROP(S): at 09:49

## 2024-11-07 RX ADMIN — CEFEPIME 100 MILLIGRAM(S): 2 INJECTION, POWDER, FOR SOLUTION INTRAVENOUS at 05:00

## 2024-11-07 RX ADMIN — ACETYLCYSTEINE 4 MILLILITER(S): 200 INHALANT RESPIRATORY (INHALATION) at 23:18

## 2024-11-07 RX ADMIN — OFLOXACIN 1 DROP(S): 3 SOLUTION OPHTHALMIC at 12:26

## 2024-11-07 RX ADMIN — ERYTHROMYCIN 1 APPLICATION(S): 5 OINTMENT OPHTHALMIC at 05:00

## 2024-11-07 RX ADMIN — Medication 667 MILLIGRAM(S): at 09:49

## 2024-11-07 RX ADMIN — Medication 1 DROP(S): at 05:00

## 2024-11-07 RX ADMIN — ERYTHROMYCIN 1 APPLICATION(S): 5 OINTMENT OPHTHALMIC at 12:25

## 2024-11-07 RX ADMIN — HEPARIN SODIUM 5000 UNIT(S): 1000 INJECTION INTRAVENOUS; SUBCUTANEOUS at 22:47

## 2024-11-07 RX ADMIN — IPRATROPIUM BROMIDE AND ALBUTEROL SULFATE 3 MILLILITER(S): .5; 2.5 SOLUTION RESPIRATORY (INHALATION) at 05:01

## 2024-11-07 RX ADMIN — HEPARIN SODIUM 5000 UNIT(S): 1000 INJECTION INTRAVENOUS; SUBCUTANEOUS at 05:02

## 2024-11-07 RX ADMIN — IPRATROPIUM BROMIDE AND ALBUTEROL SULFATE 3 MILLILITER(S): .5; 2.5 SOLUTION RESPIRATORY (INHALATION) at 17:14

## 2024-11-07 RX ADMIN — OFLOXACIN 1 DROP(S): 3 SOLUTION OPHTHALMIC at 17:10

## 2024-11-07 RX ADMIN — Medication 1 DROP(S): at 22:47

## 2024-11-07 RX ADMIN — Medication 1 APPLICATION(S): at 17:10

## 2024-11-07 RX ADMIN — OFLOXACIN 1 DROP(S): 3 SOLUTION OPHTHALMIC at 00:06

## 2024-11-07 RX ADMIN — CEFEPIME 100 MILLIGRAM(S): 2 INJECTION, POWDER, FOR SOLUTION INTRAVENOUS at 17:13

## 2024-11-07 RX ADMIN — ERYTHROMYCIN 1 APPLICATION(S): 5 OINTMENT OPHTHALMIC at 17:10

## 2024-11-07 RX ADMIN — OFLOXACIN 1 DROP(S): 3 SOLUTION OPHTHALMIC at 05:00

## 2024-11-07 RX ADMIN — Medication 1 DROP(S): at 02:42

## 2024-11-07 RX ADMIN — Medication 250 MILLILITER(S): at 17:11

## 2024-11-07 RX ADMIN — SODIUM ZIRCONIUM CYCLOSILICATE 5 GRAM(S): 5 POWDER, FOR SUSPENSION ORAL at 12:25

## 2024-11-07 RX ADMIN — Medication 667 MILLIGRAM(S): at 17:12

## 2024-11-07 RX ADMIN — AMLODIPINE BESYLATE 10 MILLIGRAM(S): 10 TABLET ORAL at 05:01

## 2024-11-07 RX ADMIN — POLYETHYLENE GLYCOL 3350 17 GRAM(S): 17 POWDER, FOR SOLUTION ORAL at 17:15

## 2024-11-07 RX ADMIN — Medication 1 DROP(S): at 17:14

## 2024-11-07 RX ADMIN — ACETYLCYSTEINE 4 MILLILITER(S): 200 INHALANT RESPIRATORY (INHALATION) at 10:26

## 2024-11-07 RX ADMIN — DOXAZOSIN MESYLATE 8 MILLIGRAM(S): 8 TABLET ORAL at 22:47

## 2024-11-07 RX ADMIN — Medication 76 MILLILITER(S): at 20:44

## 2024-11-07 RX ADMIN — ERYTHROMYCIN 1 APPLICATION(S): 5 OINTMENT OPHTHALMIC at 00:04

## 2024-11-07 RX ADMIN — Medication 1 APPLICATION(S): at 05:02

## 2024-11-07 NOTE — PROGRESS NOTE ADULT - SUBJECTIVE AND OBJECTIVE BOX
SUBJECTIVE: NAEON. Patient seen this morning, eyes open but not tracking, non-verbal. D/w nurse at bedside, had two loose BM during night shift.       DIET:  Diet, NPO with Tube Feed:   Tube Feeding Modality: Gastrostomy  Nepro with Carb Steady (NEPRORTH)  Total Volume for 24 Hours (mL): 1080  Total Number of Cans: 5  Continuous  Starting Tube Feed Rate mL per Hour: 45  Increase Tube Feed Rate by (mL): 10     Every 4 hours  Until Goal Tube Feed Rate (mL per Hour): 60  Tube Feed Duration (in Hours): 18  Tube Feed Start Time: 10:00  Tube Feed Stop Time: 04:00  Banatrol TF     Qty per Day:  2 (10-28-24 @ 16:11) [Active]      MEDICATIONS:  MEDICATIONS  (STANDING):  acetylcysteine 10%  Inhalation 4 milliLiter(s) Inhalation every 12 hours  albuterol/ipratropium for Nebulization 3 milliLiter(s) Nebulizer every 12 hours  amLODIPine   Tablet 10 milliGRAM(s) Oral daily  artificial tears (preservative free) Ophthalmic Solution 1 Drop(s) Right EYE every 4 hours  calcium acetate 667 milliGRAM(s) Oral three times a day with meals  cefepime   IVPB 2000 milliGRAM(s) IV Intermittent every 12 hours  doxazosin 8 milliGRAM(s) Oral at bedtime  erythromycin   Ointment 1 Application(s) Right EYE every 6 hours  heparin   Injectable 5000 Unit(s) SubCutaneous every 8 hours  ofloxacin 0.3% Solution 1 Drop(s) Right EYE every 6 hours  petrolatum Ophthalmic Ointment 1 Application(s) Both EYES two times a day  polyethylene glycol 3350 17 Gram(s) Oral two times a day  sodium zirconium cyclosilicate 5 Gram(s) Oral daily    MEDICATIONS  (PRN):  acetaminophen   Oral Liquid .. 650 milliGRAM(s) Oral every 6 hours PRN Temp greater or equal to 38C (100.4F), Mild Pain (1 - 3)      Allergies    Allergy Status Unknown    Intolerances        OBJECTIVE:  Vital Signs Last 24 Hrs  T(C): 36.8 (07 Nov 2024 13:31), Max: 37.3 (07 Nov 2024 11:26)  T(F): 98.2 (07 Nov 2024 13:31), Max: 99.1 (07 Nov 2024 11:26)  HR: 92 (07 Nov 2024 13:31) (77 - 95)  BP: 130/87 (07 Nov 2024 13:31) (125/81 - 141/85)  BP(mean): 106 (07 Nov 2024 11:26) (106 - 106)  RR: 17 (07 Nov 2024 13:31) (17 - 92)  SpO2: 96% (07 Nov 2024 13:31) (96% - 99%)    Parameters below as of 07 Nov 2024 13:31  Patient On (Oxygen Delivery Method): tracheostomy collar      I&O's Summary    06 Nov 2024 07:01  -  07 Nov 2024 07:00  --------------------------------------------------------  IN: 1545 mL / OUT: 1850 mL / NET: -305 mL    07 Nov 2024 07:01  -  07 Nov 2024 15:51  --------------------------------------------------------  IN: 0 mL / OUT: 250 mL / NET: -250 mL        PHYSICAL EXAM:  Gen: resting comfortably, NAD  HEENT: NCAT, MMM  Neck: trach with minimal secretions   CV: RRR, no m/r/g, peripheral pulses 2+  Pulm: CTAB, no increased work of breathing, no rales/rhonchi  Abd: soft, ND, NT, PEG with abd binder  Skin: warm and dry  Ext: non-tender, no edema  Neuro: Eyes spontaneously open, does not follow commands, anisocoria   Psych: unable to assess     LABS:                        11.6   6.51  )-----------( 167      ( 07 Nov 2024 08:16 )             37.5     11-07    139  |  104  |  63[H]  ----------------------------<  123[H]  4.8   |  19[L]  |  2.48[H]    Ca    9.5      07 Nov 2024 08:16  Phos  4.9     11-07  Mg     2.2     11-07          CAPILLARY BLOOD GLUCOSE        Urinalysis Basic - ( 07 Nov 2024 08:16 )    Color: x / Appearance: x / SG: x / pH: x  Gluc: 123 mg/dL / Ketone: x  / Bili: x / Urobili: x   Blood: x / Protein: x / Nitrite: x   Leuk Esterase: x / RBC: x / WBC x   Sq Epi: x / Non Sq Epi: x / Bacteria: x        MICRODATA:      RADIOLOGY/OTHER STUDIES:  Reviewed

## 2024-11-07 NOTE — PROGRESS NOTE ADULT - SUBJECTIVE AND OBJECTIVE BOX
HPI:  Unknown age male, unknown past medical history (reported stroke and MI by coworkers) presented to [facility] with AMS, Pt was working at [employer] when he was found down by coworkers. EMS called and pt brought to [facility] ED. Intubated, sedated, started on cardene for SBPs in 200s. CT head showed brain stem bleed. Transferred to NSICU for further management.  (30 Sep 2024 12:55)    INTERVAL EVENTS:  Fever improved, pending CT A/P    HOSPITAL COURSE:  9/30: transferred from [facility]. A line placed. Versed dc'd. Cy Rader at bedside, states pt has family in [place], cannot confirm medications or PMH other than stroke and MI. 250cc bolus 3% given. LR switched to NS. hydralazine 25q8 started, 3% started, switched propofol to precedex   10/1: stability CTH done. Added labetalol, started TF. Palliative consulted. ethics consulted to determine surrogate. febrile 103, pan cx sent  10/2: BD 2, GEORGE overnight. TF resumed. Desatt'd to 80s, FiO2 inc. to 50. Fentanyl given, ABG, CXR ordered. Maxxed on precedex, started on propofol for DARIEN -4 - -5. Precedex dc'd. Duonebs, mucomyst, hypertonic added. 3% dc'd. Cardene dc'd. Start vanc/CTX. Increased labetalol 200q8. MRSA negative, dc'd vanc. ETT pulled back 2cm x 2, good positioning after confirmatory chest xray. Ethics attempting to establish HCP with family. Na 159, starting FW 250q6 for range 150-155.   10/3: BD3, GEORGE o/n, neuro stable. Na elevating, FW increased to 300q6. Dc'd bowel reg for diarrhea. vEEG started. SQH 5000q8 tonight.   10/4: BD 4, albumn bolus, incr. LR to 80 2/2 incr. in Cr, LR to 100cc/hr for uptrending Cr. Started 7% hypersal for 48hrs and SL atropine for inline/oral thick secretions. Dc'd CTX and started ancef for MSSA in the sputum. Nephrology consulted for CKD, f/u recs. SBP 170s, given hydralazine 10mg IVP.   10/5: BD5, o/n 10mg IVP hydralazine given for SBP 170s and started on hydralazine 25q8 via OGT. 10mg IV push labetalol for SBP > 160s. RT placed for diarrhea.   10/6: BD6, o/n FW increased to 350q4 per nephrology recs. IV tylenol for temp 100.6, SBp 160s presumed uncomfortable.   10/7: BD7, overnight pancultured for temp 101.8F.   10/8: BD8. GEORGE. Cr bumped. decreased LR to 75cc/hr. Adding simethicone ATC. incr hydralazine 50mgTID. Incr labetalol 300mgTID. Na 145, decreased FWF to 250q6. Start precedex. FENa consistent with intrinsic kidney injury. Pend repeat renal US. Retaining up to 1.3L, bladder scans q6, straight cath PRN  10/9: BD 9. GEORGE overnight. Neuro stable. abd xray for distention w non-specific gas pattern, OGT to LIWS for morning. duonebs/mucomyst to q8 for improving secretions. Changed tube feeds to Jevity 1.5 20cc/hr, low rate due to abdominal distention, nepro dense and more difficult to digest. Tolerating CPAP, confirmed by ABG.   10/10: BD 10. GEORGE overnight. Neuro stable. (+) gabriel for urinary retention on bladder scan. inc TF to goal rate of 40cc/hr. family leaning toward pursuing trach/PEG. 1/2 amp for FS 81.   10/11: BD 11. GEORGE overnight. Neuro stable. Trach/PEG consults placed.   10/12: BD 12. GEORGE overnight. Neuro stable. MRI brain complete.   10/13: BD 13. Increase flomax. Hold SQH after PM dose for trach tm. IVL.   10/14: BD 14. GEORGE overnight, remains on AC/VC. Gabriel placed for urinary retention. Dc'd free water.  S/p trach with pulm. NGT placed and CXR confirmed in good position.   10/15: BD 15, GEORGE ovn. resumed feeds. spiked 101, pan cx sent.   10/16: BD 16. GEORGE ovn. Lokelma 5mg for K+ 5.4. Started vanc q 24/zosyn for empiric PNA coverage, IVF to 100/hr. PEG held for fever.   10/17: BD 17,  ordered serum osm and urine osm for am. Started sinemet for neurostimulation. Increased cardura to 0.8. Started FW 100q4, dc'd IVF. MRSA negative, dc'd vanc. NGT replaced d/t coiling.   10/18: BD 18, GEORGE overnight, neuro stable. Amantadine added for neurostim. zosyn changed to unasyn for acinetobacter baumannii, failed TOV and required SC  10/19: BD 19, GEORGE ovn. cardura 2mg added for retention. labetalol decreased 200q8, hydralazine decreased 25q8. Gabriel replaced.   10/20: BD20, GEORGE overnight. NGT dislodged, replaced. PEG tomorrow w/ gen surg, FW increased to 150q4 and labetalol decreased to 100q8, lokelma given for hyperkalemia.   10/21: BD 21. POD0 PEG placement with Gen surg. decr labetolol to 50q8, incr. cardura to 0.4, started lokelma and phoslo, dc gabriel POD0 PEG placement with Gen surg.  10/22: BD 22. Plan to start TF today via PEG. dc labetalol, Following ophtho recs. Increased apnea settings - found to be in cheyne-moe respiration. CPAP 5/5.  10/23: BD 23. hydralazine d/c'd, trach collar trial today. Rectal tube placed at 6am.  10/24: BD24, o/n lokelma held due to diarrhea. Free water 100q6 resumed. dc'd tamsulosin, amantadine. Incr'd cardura to 8mg qhs. Dc'd FW. Switched jevity to nepro. gabriel placed for high urine output. Started SL atropine for oral secretions. Dc'd free water.  10/25: BD25, o/n decreased suctioning requirements to > q4hrs, GEORGE. Cr improving, cont phoslo, lokelma held at this time. Gabriel placed yest, cont. Tolerating trach collar. Given 500cc plasmalyte bolus for ANIKA. Dc'd sinemet.   10/26: BD26, o/n resumed lokelma 5mg daily and resumed 100cc free water q6hrs. Change in neuro status with new right pupillary dilation with anisocoria (right pupil 6mm fixed and left pupil 3mm briskly reactive). Given 23.4% NaCl bullet, taken for emergent CTH showing mostly resolved pontine hemorrhage, continued brainstem hypodensity likely edema d/t hemorrhage, no new hemorrhage or infarct, no herniation, mild increase in size of left lateral ventricle. Vitals remaine stable. Na goal > 140.   10/27: BD27, o/n GEORGE.Neuro stable. Pend stepdown with airway bed.   10/28: BD 28. GEORGE overnight. Neuro stable. Miralax ordered. Gabriel removed, pending TOV.  10/29: BD 29. GEORGE o/n. Given 2L NS over 8 hrs for increased BUN/Cr ratio. Gabriel placed for frequent straight cath.   10/30: BD 30.   10/31: BD 31. GEORGE overnight. Na 149, increased free water to 200q6. 1L NS for uptrending BUN.   11/1: BD 32. GEORGE overnight. Given 1L NS for dehydration. Na 146, increased FW to 250q6.   11/2: BD 33 GEORGE overnight, neuro stable, given 500cc bolus for net negative status and tachycardia   11/3: BD 34, GEORGE overnight, neuro stable. Patient remains tachycardic, EKG showing sinus tachycardia, given additional 500cc NS bolus. Febrile to 101.9F, pan cultured (without UA), CXR WNL, given tylenol.   11/4: BD 35, GEORGE overnight, neuro stable. Given 1L NS for tachycardia. sputum (+) for stenotropohomas maltophilia.   11/5: BD 36 GEORGE overnight, neuro stable. Vancomycin dc'd. Chest PT BID. ID consulted, cont zosyn.  11/6: BD 37. blood cx + klebsiella dc zosyn changed to cefepime, CTAP ordered, rpt blood cx sent.    11/7: BD 38. Pending CT A/P         Vital Signs Last 24 Hrs  T(C): 36.7 (06 Nov 2024 20:11), Max: 37.1 (06 Nov 2024 14:03)  T(F): 98.1 (06 Nov 2024 20:11), Max: 98.7 (06 Nov 2024 14:03)  HR: 78 (06 Nov 2024 20:11) (77 - 81)  BP: 125/81 (06 Nov 2024 20:11) (120/82 - 125/82)  BP(mean): 98 (06 Nov 2024 15:24) (96 - 98)  RR: 17 (06 Nov 2024 20:11) (16 - 18)  SpO2: 98% (06 Nov 2024 20:11) (97% - 99%)    Parameters below as of 06 Nov 2024 20:12  Patient On (Oxygen Delivery Method): tracheostomy collar        I&O's Summary    05 Nov 2024 07:01  -  06 Nov 2024 07:00  --------------------------------------------------------  IN: 1250 mL / OUT: 1800 mL / NET: -550 mL    06 Nov 2024 07:01  -  07 Nov 2024 01:41  --------------------------------------------------------  IN: 1305 mL / OUT: 1750 mL / NET: -445 mL        PHYSICAL EXAM:  General: NAD, pt laying in bed  HEENT: PERRL 4mm, rt eye injected/erythematous  Cardiovascular: RRR, S1, S2  Respiratory: breathing non-labored on trach collar, chest rise symmetric  GI: abd soft, NTND peg tube in place   : gabriel in place  Neuro: A&Ox0, eyes track vertically, not following commands otherwise, small spont movements of RUE, RLE; LUE otherwise 0/5;  BLE withdraw to noxious stim  Extremities: warm and well perfused      LABS:                        11.8   4.22  )-----------( 178      ( 06 Nov 2024 05:30 )             38.4     11-06    143  |  108  |  65[H]  ----------------------------<  109[H]  4.5   |  22  |  2.78[H]    Ca    9.2      06 Nov 2024 05:30  Phos  3.9     11-06  Mg     2.4     11-06        Urinalysis Basic - ( 06 Nov 2024 05:30 )    Color: x / Appearance: x / SG: x / pH: x  Gluc: 109 mg/dL / Ketone: x  / Bili: x / Urobili: x   Blood: x / Protein: x / Nitrite: x   Leuk Esterase: x / RBC: x / WBC x   Sq Epi: x / Non Sq Epi: x / Bacteria: x          CAPILLARY BLOOD GLUCOSE          Drug Levels: [] N/A    CSF Analysis: [] N/A      Allergies    Allergy Status Unknown    Intolerances      MEDICATIONS:  Antibiotics:  cefepime   IVPB 2000 milliGRAM(s) IV Intermittent every 12 hours    Neuro:  acetaminophen   Oral Liquid .. 650 milliGRAM(s) Oral every 6 hours PRN    Anticoagulation:  heparin   Injectable 5000 Unit(s) SubCutaneous every 8 hours    OTHER:  acetylcysteine 10%  Inhalation 4 milliLiter(s) Inhalation every 12 hours  albuterol/ipratropium for Nebulization 3 milliLiter(s) Nebulizer every 12 hours  amLODIPine   Tablet 10 milliGRAM(s) Oral daily  artificial tears (preservative free) Ophthalmic Solution 1 Drop(s) Right EYE every 4 hours  doxazosin 8 milliGRAM(s) Oral at bedtime  erythromycin   Ointment 1 Application(s) Right EYE every 6 hours  ofloxacin 0.3% Solution 1 Drop(s) Right EYE every 6 hours  petrolatum Ophthalmic Ointment 1 Application(s) Both EYES two times a day  polyethylene glycol 3350 17 Gram(s) Oral two times a day  sodium zirconium cyclosilicate 5 Gram(s) Oral daily    IVF:  calcium acetate 667 milliGRAM(s) Oral three times a day with meals    CULTURES:  Culture Results:   Moderate Stenotrophomonas maltophilia  Commensal iram consistent with body site (11-03 @ 16:05)  Culture Results:   No growth at 72 Hours (11-03 @ 15:46)    RADIOLOGY & ADDITIONAL TESTS:      ASSESSMENT:  43 y/o PMH ?stroke/MI present to [facility] after collapsing at work. Decorticate posturing, vomiting, intubated for airway protection. 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 Transferred to [facility] for further management. s/p trach 10/14. s/p peg 10/21.      PLAN:  Neuro:  - Neuro/vitals q8hr   - pain control: Tylenol prn, oxy prn  - CTH 9/30: enlarged pontine hemorrhage, CTH 10/3: read stable, CTH 10/25 anisocoric pupils (R sluggish 6mm compared to left 3mm briskly reactive); showing mostly resolved pontine hemorrhage, continued brainstem hypodensity likely edema d/t hemorrhage, no new hemorrhage or infarct, no herniation, mild increase in size of left lateral ventricle  - vEEG (10/3-4)- negative, (10/17-10/19) - negative  - stroke core measures, stroke neuro signed off  - MRI brain w/ w/o contrast 10/12: parenchymal hemorrhage, acute/subacute R cerebellar stroke      CV:  - -160  - HTN: amlodipine 10 mg qd  - echo (9/30) EF 75%    Resp:  - tolerating trach collar 35%  - duonebs/mucomyst q12h  - chest PT vest BID    GI:  - Nepro TF via PEG (placed 10/21 by gen surg)  - bowel regimen, last BM 11/3  - CTAP pending     Renal:  - gabriel placed 10/29 d/t urinary retention - to be replaced 11/7  - FW flushes 250cc q6hrs   - hyperK 10/20: lokelma 5mg qd  - hyperPhos: phoslo 667mg TID   - Urinary retenion: Cardura 8 mg   - CKD: trend BUN/Cr  - Na goal 135-145  - renal US 10/1: echogenicity c/w chronic med renal dz, repeat 10/8: inc renal echogeicity, c/f medical renal disease w increased hydro     Endo:  - A1c 5.4    Heme:  - DVT ppx: SCDs, SQH 5000u q8h     ID:  - pan cultured 11/3 (w/o UA) , (+) sputum for stenotrophomas maltophlia, blood cx (+) klebsiella, cefepime 2gq12 (11/6 -)  - ID following  - empiric tx: s/p zosyn (11/3-11/6) , s/p vanc  (11/3- 11/5)  - S/p Ancef (10/4-10/14) for PNA, and s/p Unasyn (10/18-10/23) +actinobacter baumanii      MISC:  - ophtho consult for keratitis, rec erythromycin ointment to rt eye q4hrs, ofloxacin ointment to rt eye QID, artificial tears to rt eye q2hrs, moisture chamber at bedtime    Dispo: regional, full code, pending placement and emergency medicaid     D/w Dr. D'Amico

## 2024-11-07 NOTE — PROGRESS NOTE ADULT - ASSESSMENT
44 YOF with unclear PMH (?stroke, MI) who was found down at work, intubated for airway protection and found to have acute parenchymal hemorrhage within nabil with mass effect (+ acute/subacute right cerebellar infarct) in setting of hypertensive emergency, transferred to [facility] for further neurosurgical care. Hospital course c/b poor neurologic recovery s/p trach-PEG, AUR s/p gabriel, ANIKA on CKD c/b hyperkalemia, HAP s/p amp-sulbactam (EOT 10/23), K aerogenes bacteremia.

## 2024-11-07 NOTE — PROGRESS NOTE ADULT - PROBLEM SELECTOR PLAN 1
Developed sepsis 11/3 and started on broad spectrums with clinical improvement. SC positive for S maltophilia thought colonizer per discussion with ID, BC 11/3 positive K aerogenes  - source of bacteremia suspected  (no UC data) in setting of chronic gabriel (exchanged overnight)  - pending CT AP to r/o pyelo/abscess and surveillance BC 11/6   - ID following, currently on cefepime 2g q12 (11/6-present), tentative duration 7d pending above

## 2024-11-07 NOTE — PROGRESS NOTE ADULT - SUBJECTIVE AND OBJECTIVE BOX
INFECTIOUS DISEASES CONSULT FOLLOW-UP NOTE    INTERVAL HPI/OVERNIGHT EVENTS:    Patient seen and examined at bedside. GEORGE. Afebrile. Per nurse, patient has not required increase in suctioning, has not noted thick secretions. Had gabriel exchanged last shift. 2 loose BMs yesterday but is on tube feeds       ROS:   EBER      ANTIBIOTICS/RELEVANT:    MEDICATIONS  (STANDING):  acetylcysteine 10%  Inhalation 4 milliLiter(s) Inhalation every 12 hours  albuterol/ipratropium for Nebulization 3 milliLiter(s) Nebulizer every 12 hours  amLODIPine   Tablet 10 milliGRAM(s) Oral daily  artificial tears (preservative free) Ophthalmic Solution 1 Drop(s) Right EYE every 4 hours  calcium acetate 667 milliGRAM(s) Oral three times a day with meals  cefepime   IVPB 2000 milliGRAM(s) IV Intermittent every 12 hours  doxazosin 8 milliGRAM(s) Oral at bedtime  erythromycin   Ointment 1 Application(s) Right EYE every 6 hours  heparin   Injectable 5000 Unit(s) SubCutaneous every 8 hours  ofloxacin 0.3% Solution 1 Drop(s) Right EYE every 6 hours  petrolatum Ophthalmic Ointment 1 Application(s) Both EYES two times a day  polyethylene glycol 3350 17 Gram(s) Oral two times a day  sodium zirconium cyclosilicate 5 Gram(s) Oral daily    MEDICATIONS  (PRN):  acetaminophen   Oral Liquid .. 650 milliGRAM(s) Oral every 6 hours PRN Temp greater or equal to 38C (100.4F), Mild Pain (1 - 3)        Vital Signs Last 24 Hrs  T(C): 36.8 (07 Nov 2024 04:50), Max: 37.1 (06 Nov 2024 14:03)  T(F): 98.3 (07 Nov 2024 04:50), Max: 98.7 (06 Nov 2024 14:03)  HR: 95 (07 Nov 2024 04:50) (77 - 95)  BP: 141/85 (07 Nov 2024 04:50) (123/86 - 141/85)  BP(mean): 98 (06 Nov 2024 15:24) (96 - 98)  RR: 17 (07 Nov 2024 04:50) (16 - 17)  SpO2: 99% (07 Nov 2024 04:50) (97% - 99%)    Parameters below as of 07 Nov 2024 04:50  Patient On (Oxygen Delivery Method): tracheostomy collar  O2 Flow (L/min): 10  O2 Concentration (%): 35    11-06-24 @ 07:01  -  11-07-24 @ 07:00  --------------------------------------------------------  IN: 1545 mL / OUT: 1850 mL / NET: -305 mL      PHYSICAL EXAM:  Constitutional: NAD  Eyes: the sclera and conjunctiva were normal.   ENT: the ears and nose were normal in appearance.   Neck: +trach  Pulmonary: no respiratory distress and lungs were clear to auscultation anteriorly   Heart: heart rate was normal and rhythm regular, normal S1 and S2  Vascular: there was no peripheral edema  Abdomen:  soft, +PEG  Neurological: eyes open but not tracking, does not follow commands   R PIV without erythema or edema         LABS:                        11.6   6.51  )-----------( 167      ( 07 Nov 2024 08:16 )             37.5     11-07    139  |  104  |  63[H]  ----------------------------<  123[H]  4.8   |  19[L]  |  2.48[H]    Ca    9.5      07 Nov 2024 08:16  Phos  4.9     11-07  Mg     2.2     11-07        Urinalysis Basic - ( 07 Nov 2024 08:16 )    Color: x / Appearance: x / SG: x / pH: x  Gluc: 123 mg/dL / Ketone: x  / Bili: x / Urobili: x   Blood: x / Protein: x / Nitrite: x   Leuk Esterase: x / RBC: x / WBC x   Sq Epi: x / Non Sq Epi: x / Bacteria: x        MICROBIOLOGY:    Culture - Sputum (collected 11-03-24 @ 16:05)  Source: .Sputum Sputum  Gram Stain (11-03-24 @ 22:37):    Numerous polymorphonuclear leukocytes per low power field    Numerous Squamous epithelial cells per low power field    Moderate Gram Negative Diplococci per oil power field    Few Gram Negative Rods per oil power field    Rare Gram positive cocci in pairs per oil power field  Final Report (11-05-24 @ 16:32):    Moderate Stenotrophomonas maltophilia    Commensal iram consistent with body site  Organism: Stenotrophomonas maltophilia  Stenotrophomonas maltophilia (11-05-24 @ 16:32)  Organism: Stenotrophomonas maltophilia (11-05-24 @ 16:32)      Method Type: KB      -  Minocycline: S  Organism: Stenotrophomonas maltophilia (11-05-24 @ 16:32)      Method Type: DEVENDRA      -  Levofloxacin: S <=0.5      -  Trimethoprim/Sulfamethoxazole: S <=0.5/9.5    Culture - Blood (collected 11-03-24 @ 15:46)  Source: .Blood BLOOD  Gram Stain (11-06-24 @ 11:32):    Growth in aerobic bottle: Gram Negative Rods  Preliminary Report (11-06-24 @ 11:33):    Growth in aerobic bottle: Gram Negative Rods    Direct identification is available within approximately 3-5    hours either by Blood Panel Multiplexed PCR or Direct    MALDI-TOF. Details: [email or web address]  Organism: Blood Culture PCR (11-06-24 @ 12:53)  Organism: Blood Culture PCR (11-06-24 @ 12:53)      Method Type: PCR      -  Klebsiella aerogenes: Detec    Culture - Blood (collected 11-03-24 @ 15:46)  Source: .Blood BLOOD  Preliminary Report (11-06-24 @ 21:00):    No growth at 72 Hours        RADIOLOGY & ADDITIONAL STUDIES:  Reviewed

## 2024-11-08 LAB
-  AMPICILLIN/SULBACTAM: SIGNIFICANT CHANGE UP
-  AMPICILLIN: SIGNIFICANT CHANGE UP
-  CEFAZOLIN: SIGNIFICANT CHANGE UP
-  CEFEPIME: SIGNIFICANT CHANGE UP
-  CEFOXITIN: SIGNIFICANT CHANGE UP
-  CEFTRIAXONE: SIGNIFICANT CHANGE UP
-  CIPROFLOXACIN: SIGNIFICANT CHANGE UP
-  GENTAMICIN: SIGNIFICANT CHANGE UP
-  LEVOFLOXACIN: SIGNIFICANT CHANGE UP
-  PIPERACILLIN/TAZOBACTAM: SIGNIFICANT CHANGE UP
-  TOBRAMYCIN: SIGNIFICANT CHANGE UP
-  TRIMETHOPRIM/SULFAMETHOXAZOLE: SIGNIFICANT CHANGE UP
ANION GAP SERPL CALC-SCNC: 9 MMOL/L — SIGNIFICANT CHANGE UP (ref 5–17)
BUN SERPL-MCNC: 62 MG/DL — HIGH (ref 7–23)
CALCIUM SERPL-MCNC: 9.4 MG/DL — SIGNIFICANT CHANGE UP (ref 8.4–10.5)
CHLORIDE SERPL-SCNC: 104 MMOL/L — SIGNIFICANT CHANGE UP (ref 96–108)
CO2 SERPL-SCNC: 23 MMOL/L — SIGNIFICANT CHANGE UP (ref 22–31)
CREAT SERPL-MCNC: 2.25 MG/DL — HIGH (ref 0.5–1.3)
CULTURE RESULTS: ABNORMAL
CULTURE RESULTS: SIGNIFICANT CHANGE UP
EGFR: 36 ML/MIN/1.73M2 — LOW
EGFR: 36 ML/MIN/1.73M2 — LOW
GLUCOSE SERPL-MCNC: 103 MG/DL — HIGH (ref 70–99)
HCT VFR BLD CALC: 37.5 % — LOW (ref 39–50)
HGB BLD-MCNC: 11.8 G/DL — LOW (ref 13–17)
MCHC RBC-ENTMCNC: 28.2 PG — SIGNIFICANT CHANGE UP (ref 27–34)
MCHC RBC-ENTMCNC: 31.5 G/DL — LOW (ref 32–36)
MCV RBC AUTO: 89.5 FL — SIGNIFICANT CHANGE UP (ref 80–100)
METHOD TYPE: SIGNIFICANT CHANGE UP
NRBC # BLD: 0 /100 WBCS — SIGNIFICANT CHANGE UP (ref 0–0)
NRBC BLD-RTO: 0 /100 WBCS — SIGNIFICANT CHANGE UP (ref 0–0)
ORGANISM # SPEC MICROSCOPIC CNT: ABNORMAL
ORGANISM # SPEC MICROSCOPIC CNT: ABNORMAL
ORGANISM # SPEC MICROSCOPIC CNT: SIGNIFICANT CHANGE UP
PLATELET # BLD AUTO: 204 K/UL — SIGNIFICANT CHANGE UP (ref 150–400)
POTASSIUM SERPL-MCNC: 5 MMOL/L — SIGNIFICANT CHANGE UP (ref 3.5–5.3)
POTASSIUM SERPL-SCNC: 5 MMOL/L — SIGNIFICANT CHANGE UP (ref 3.5–5.3)
RBC # BLD: 4.19 M/UL — LOW (ref 4.2–5.8)
RBC # FLD: 13.6 % — SIGNIFICANT CHANGE UP (ref 10.3–14.5)
SODIUM SERPL-SCNC: 136 MMOL/L — SIGNIFICANT CHANGE UP (ref 135–145)
SPECIMEN SOURCE: SIGNIFICANT CHANGE UP
SPECIMEN SOURCE: SIGNIFICANT CHANGE UP
WBC # BLD: 5.73 K/UL — SIGNIFICANT CHANGE UP (ref 3.8–10.5)
WBC # FLD AUTO: 5.73 K/UL — SIGNIFICANT CHANGE UP (ref 3.8–10.5)

## 2024-11-08 PROCEDURE — 99232 SBSQ HOSP IP/OBS MODERATE 35: CPT

## 2024-11-08 PROCEDURE — 99231 SBSQ HOSP IP/OBS SF/LOW 25: CPT

## 2024-11-08 PROCEDURE — 74177 CT ABD & PELVIS W/CONTRAST: CPT | Mod: 26

## 2024-11-08 RX ORDER — IPRATROPIUM BROMIDE AND ALBUTEROL SULFATE .5; 2.5 MG/3ML; MG/3ML
3 SOLUTION RESPIRATORY (INHALATION) EVERY 6 HOURS
Refills: 0 | Status: DISCONTINUED | OUTPATIENT
Start: 2024-11-08 | End: 2024-11-13

## 2024-11-08 RX ORDER — ACETYLCYSTEINE 200 MG/ML
4 INHALANT RESPIRATORY (INHALATION) EVERY 6 HOURS
Refills: 0 | Status: DISCONTINUED | OUTPATIENT
Start: 2024-11-08 | End: 2024-11-13

## 2024-11-08 RX ADMIN — ERYTHROMYCIN 1 APPLICATION(S): 5 OINTMENT OPHTHALMIC at 00:31

## 2024-11-08 RX ADMIN — AMLODIPINE BESYLATE 10 MILLIGRAM(S): 10 TABLET ORAL at 06:15

## 2024-11-08 RX ADMIN — ACETYLCYSTEINE 4 MILLILITER(S): 200 INHALANT RESPIRATORY (INHALATION) at 19:22

## 2024-11-08 RX ADMIN — ERYTHROMYCIN 1 APPLICATION(S): 5 OINTMENT OPHTHALMIC at 13:16

## 2024-11-08 RX ADMIN — Medication 1 DROP(S): at 06:06

## 2024-11-08 RX ADMIN — ERYTHROMYCIN 1 APPLICATION(S): 5 OINTMENT OPHTHALMIC at 19:21

## 2024-11-08 RX ADMIN — IPRATROPIUM BROMIDE AND ALBUTEROL SULFATE 3 MILLILITER(S): .5; 2.5 SOLUTION RESPIRATORY (INHALATION) at 13:18

## 2024-11-08 RX ADMIN — Medication 667 MILLIGRAM(S): at 10:30

## 2024-11-08 RX ADMIN — SODIUM ZIRCONIUM CYCLOSILICATE 5 GRAM(S): 5 POWDER, FOR SUSPENSION ORAL at 13:18

## 2024-11-08 RX ADMIN — Medication 1 DROP(S): at 13:16

## 2024-11-08 RX ADMIN — Medication 1 DROP(S): at 10:32

## 2024-11-08 RX ADMIN — HEPARIN SODIUM 5000 UNIT(S): 1000 INJECTION INTRAVENOUS; SUBCUTANEOUS at 13:19

## 2024-11-08 RX ADMIN — CEFEPIME 100 MILLIGRAM(S): 2 INJECTION, POWDER, FOR SOLUTION INTRAVENOUS at 19:21

## 2024-11-08 RX ADMIN — IPRATROPIUM BROMIDE AND ALBUTEROL SULFATE 3 MILLILITER(S): .5; 2.5 SOLUTION RESPIRATORY (INHALATION) at 06:05

## 2024-11-08 RX ADMIN — POLYETHYLENE GLYCOL 3350 17 GRAM(S): 17 POWDER, FOR SOLUTION ORAL at 19:21

## 2024-11-08 RX ADMIN — Medication 1 DROP(S): at 19:22

## 2024-11-08 RX ADMIN — HEPARIN SODIUM 5000 UNIT(S): 1000 INJECTION INTRAVENOUS; SUBCUTANEOUS at 06:04

## 2024-11-08 RX ADMIN — Medication 1 APPLICATION(S): at 19:21

## 2024-11-08 RX ADMIN — HEPARIN SODIUM 5000 UNIT(S): 1000 INJECTION INTRAVENOUS; SUBCUTANEOUS at 21:35

## 2024-11-08 RX ADMIN — Medication 1 DROP(S): at 22:24

## 2024-11-08 RX ADMIN — Medication 1 DROP(S): at 01:33

## 2024-11-08 RX ADMIN — IPRATROPIUM BROMIDE AND ALBUTEROL SULFATE 3 MILLILITER(S): .5; 2.5 SOLUTION RESPIRATORY (INHALATION) at 19:22

## 2024-11-08 RX ADMIN — OFLOXACIN 1 DROP(S): 3 SOLUTION OPHTHALMIC at 00:32

## 2024-11-08 RX ADMIN — Medication 1 APPLICATION(S): at 06:33

## 2024-11-08 RX ADMIN — OFLOXACIN 1 DROP(S): 3 SOLUTION OPHTHALMIC at 06:05

## 2024-11-08 RX ADMIN — OFLOXACIN 1 DROP(S): 3 SOLUTION OPHTHALMIC at 13:17

## 2024-11-08 RX ADMIN — Medication 667 MILLIGRAM(S): at 13:19

## 2024-11-08 RX ADMIN — OFLOXACIN 1 DROP(S): 3 SOLUTION OPHTHALMIC at 19:23

## 2024-11-08 RX ADMIN — ERYTHROMYCIN 1 APPLICATION(S): 5 OINTMENT OPHTHALMIC at 06:05

## 2024-11-08 RX ADMIN — DOXAZOSIN MESYLATE 8 MILLIGRAM(S): 8 TABLET ORAL at 21:35

## 2024-11-08 RX ADMIN — Medication 667 MILLIGRAM(S): at 19:22

## 2024-11-08 RX ADMIN — ACETYLCYSTEINE 4 MILLILITER(S): 200 INHALANT RESPIRATORY (INHALATION) at 13:19

## 2024-11-08 RX ADMIN — CEFEPIME 100 MILLIGRAM(S): 2 INJECTION, POWDER, FOR SOLUTION INTRAVENOUS at 06:04

## 2024-11-08 NOTE — PROGRESS NOTE ADULT - PROBLEM SELECTOR PROBLEM 7
Acute urinary retention
Functional quadriplegia
Acute urinary retention
Functional quadriplegia
Acute urinary retention

## 2024-11-08 NOTE — PROGRESS NOTE ADULT - PROBLEM SELECTOR PLAN 8
In setting of brainstem hemorrhage  - decub precautions

## 2024-11-08 NOTE — PROGRESS NOTE ADULT - ASSESSMENT
44 YOF with unclear PMH (?stroke, MI) who was found down at work, intubated for airway protection and found to have acute parenchymal hemorrhage within nabil with mass effect (+ acute/subacute right cerebellar infarct) in setting of hypertensive emergency, transferred to [facility] for further neurosurgical care. Hospital course c/b poor neurologic recovery s/p trach-PEG, AUR s/p gabriel, ANIKA on CKD c/b hyperkalemia, HAP s/p amp-sulbactam (EOT 10/23). Developed fever 101.9 with increase in WBC (10.9) and elevated procal (0.66) on 11/3- was pancultured (without UA), CXR was wnl. Started on vanc and zosyn on 11/3. Vanc discontinued on 11/4 given low concern for MRSA. Defervesced with resolution of leukocytosis. Bcxs ngtd, MRSA PCR negative, MSSA positive, RVP/COVID negative, Sputum culture with stenotrophomonas and commensal iram. Source of fever unclear --  tracheitis vs , less likely GI (no diarrhea) or SSTI. Suspect steno that grew in sputum culture is colonizer; patient clinically improved without steno coverage. Patient has not had increased O2 requirements and CXR unremarkable so overall low suspicion for HAP but given reported thickened tracheal secretions and improvement with zosyn, ok with continuing for short course. 11/3 blood cultures 1/4 bottles with kleb aerogenes (source possibly  vs GI (less likely). Gabriel exchanged 11/7. No thickened secretions on exam. CT A/P without evidence of an infectious process.     Suggest:  -f/u bcxs -- klebsiella aerogenes (S to cefepime S<=2)  -f/u surveillance blood culture 11/6-- ngtd 24 hours   -continue cefepime 2g IV Q12 (renally dosed)  -duration of antibiotics will be 7 days (11/6- 11/12)    Team 2 will sign off. Thank you for your consultation   Please recall if further ID input is desired or if surveillance blood culture turns positive   Case d/w primary team.  Final recommendation pending attending note.    Tati Ambriz, Infectious Diseases PA  Please reach out for any questions 9 am-5pm. For evenings and weekends, please call the ID physician on call.  Work cell: [phone]

## 2024-11-08 NOTE — PROGRESS NOTE ADULT - TIME BILLING
chart review, patient exam, review of labs/imaging, management of medical conditions, discussion with consultants, documentation;

## 2024-11-08 NOTE — PROGRESS NOTE ADULT - SUBJECTIVE AND OBJECTIVE BOX
INFECTIOUS DISEASES CONSULT FOLLOW-UP NOTE    INTERVAL HPI/OVERNIGHT EVENTS:    Patient seen and examined at bedside. GEORGE. Afebrile.    ROS:   EBER      ANTIBIOTICS/RELEVANT:    MEDICATIONS  (STANDING):  acetylcysteine 10%  Inhalation 4 milliLiter(s) Inhalation every 6 hours  albuterol/ipratropium for Nebulization 3 milliLiter(s) Nebulizer every 6 hours  amLODIPine   Tablet 10 milliGRAM(s) Oral daily  artificial tears (preservative free) Ophthalmic Solution 1 Drop(s) Right EYE every 4 hours  calcium acetate 667 milliGRAM(s) Oral three times a day with meals  cefepime   IVPB 2000 milliGRAM(s) IV Intermittent every 12 hours  doxazosin 8 milliGRAM(s) Oral at bedtime  erythromycin   Ointment 1 Application(s) Right EYE every 6 hours  heparin   Injectable 5000 Unit(s) SubCutaneous every 8 hours  ofloxacin 0.3% Solution 1 Drop(s) Right EYE every 6 hours  petrolatum Ophthalmic Ointment 1 Application(s) Both EYES two times a day  polyethylene glycol 3350 17 Gram(s) Oral two times a day  sodium zirconium cyclosilicate 5 Gram(s) Oral daily    MEDICATIONS  (PRN):  acetaminophen   Oral Liquid .. 650 milliGRAM(s) Oral every 6 hours PRN Temp greater or equal to 38C (100.4F), Mild Pain (1 - 3)        Vital Signs Last 24 Hrs  T(C): 37.1 (08 Nov 2024 11:53), Max: 37.6 (07 Nov 2024 16:00)  T(F): 98.7 (08 Nov 2024 11:53), Max: 99.6 (07 Nov 2024 16:00)  HR: 90 (08 Nov 2024 11:53) (75 - 98)  BP: 124/86 (08 Nov 2024 11:53) (124/86 - 150/98)  BP(mean): 99 (08 Nov 2024 11:53) (97 - 99)  RR: 16 (08 Nov 2024 11:53) (16 - 17)  SpO2: 97% (08 Nov 2024 11:53) (95% - 99%)    Parameters below as of 08 Nov 2024 10:39  Patient On (Oxygen Delivery Method): tracheostomy collar    O2 Concentration (%): 35    11-07-24 @ 07:01  -  11-08-24 @ 07:00  --------------------------------------------------------  IN: 760 mL / OUT: 1625 mL / NET: -865 mL    11-08-24 @ 07:01  -  11-08-24 @ 12:10  --------------------------------------------------------  IN: 0 mL / OUT: 250 mL / NET: -250 mL      PHYSICAL EXAM:  Constitutional: NAD  Eyes: the sclera and conjunctiva were normal.   ENT: the ears and nose were normal in appearance.   Neck: +trach  Pulmonary: no respiratory distress and lungs were clear to auscultation anteriorly   Heart: heart rate was normal and rhythm regular, normal S1 and S2  Vascular: there was no peripheral edema  Abdomen:  soft, +PEG  Neurological: eyes open but not tracking, does not follow commands   R PIV without erythema or edema       LABS:                        11.6   6.51  )-----------( 167      ( 07 Nov 2024 08:16 )             37.5     11-07    139  |  104  |  63[H]  ----------------------------<  123[H]  4.8   |  19[L]  |  2.48[H]    Ca    9.5      07 Nov 2024 08:16  Phos  4.9     11-07  Mg     2.2     11-07        Urinalysis Basic - ( 07 Nov 2024 08:16 )    Color: x / Appearance: x / SG: x / pH: x  Gluc: 123 mg/dL / Ketone: x  / Bili: x / Urobili: x   Blood: x / Protein: x / Nitrite: x   Leuk Esterase: x / RBC: x / WBC x   Sq Epi: x / Non Sq Epi: x / Bacteria: x        MICROBIOLOGY:    Culture - Blood (collected 11-06-24 @ 20:05)  Source: .Blood BLOOD  Preliminary Report (11-08-24 @ 06:01):    No growth at 24 hours    Culture - Sputum (collected 11-03-24 @ 16:05)  Source: .Sputum Sputum  Gram Stain (11-03-24 @ 22:37):    Numerous polymorphonuclear leukocytes per low power field    Numerous Squamous epithelial cells per low power field    Moderate Gram Negative Diplococci per oil power field    Few Gram Negative Rods per oil power field    Rare Gram positive cocci in pairs per oil power field  Final Report (11-05-24 @ 16:32):    Moderate Stenotrophomonas maltophilia    Commensal iram consistent with body site  Organism: Stenotrophomonas maltophilia  Stenotrophomonas maltophilia (11-05-24 @ 16:32)  Organism: Stenotrophomonas maltophilia (11-05-24 @ 16:32)      Method Type: KB      -  Minocycline: S  Organism: Stenotrophomonas maltophilia (11-05-24 @ 16:32)      Method Type: DEVENDRA      -  Levofloxacin: S <=0.5      -  Trimethoprim/Sulfamethoxazole: S <=0.5/9.5    Culture - Blood (collected 11-03-24 @ 15:46)  Source: .Blood BLOOD  Gram Stain (11-06-24 @ 11:32):    Growth in aerobic bottle: Gram Negative Rods  Final Report (11-08-24 @ 12:08):    Growth in aerobic bottle: Klebsiella aerogenes (Previously Enterobacter)    Direct identification is available within approximately 3-5    hours either by Blood Panel Multiplexed PCR or Direct    MALDI-TOF. Details: [email or web address]  Organism: Blood Culture PCR  Klebsiella aerogenes (Previously Enterobacter) (11-08-24 @ 12:08)  Organism: Klebsiella aerogenes (Previously Enterobacter) (11-08-24 @ 12:08)      Method Type: DEVENDRA      -  Ampicillin: R >16 These ampicillin results predict results for amoxicillin      -  Ampicillin/Sulbactam: R >16/8      -  Cefazolin: R >16      -  Cefepime: S <=2      -  Cefoxitin: R >16      -  Ceftriaxone: R 32 Enterobacter cloacae, Klebsiella aerogenes, and Citrobacter freundii may develop resistance during prolonged therapy.      -  Ciprofloxacin: S <=0.25      -  Gentamicin: S <=2      -  Levofloxacin: S <=0.5      -  Piperacillin/Tazobactam: R 32 Treatment with Pipercillin/Tazobactam is not recommended in severe infections casued by Klebsiella aerogenes, Enterobacter cloacae complex, and Citrobacter freundii complex.      -  Tobramycin: S <=2      -  Trimethoprim/Sulfamethoxazole: S <=0.5/9.5  Organism: Blood Culture PCR (11-08-24 @ 12:08)      Method Type: PCR      -  Klebsiella aerogenes: Detec    Culture - Blood (collected 11-03-24 @ 15:46)  Source: .Blood BLOOD  Preliminary Report (11-07-24 @ 21:01):    No growth at 4 days        RADIOLOGY & ADDITIONAL STUDIES:  Reviewed

## 2024-11-08 NOTE — PROGRESS NOTE ADULT - PROBLEM SELECTOR PLAN 7
In setting of brainstem hemorrhage  - decub precautions
Vuong placed 10/24 for urinary retention, failed TOV 10/27.   - doxazosin 8mg
In setting of brainstem hemorrhage  - decub precautions
Vuong placed 10/24 for urinary retention, failed TOV 10/27.   - doxazosin 8mg

## 2024-11-08 NOTE — PROGRESS NOTE ADULT - PROBLEM SELECTOR PLAN 1
Developed sepsis 11/3 and started on broad spectrums with clinical improvement. SC positive for S maltophilia thought colonizer per discussion with ID, BC 11/3 positive K aerogenes  - source of bacteremia suspected  (no UC data) in setting of chronic gabriel (exchanged 11/7)  - CTAP w/o evidence of intra-abdominal infection, surveillance BC NGTD   - evaluated by ED, plan for 7-day course cefepime 2g q12 (11/6-11/12)

## 2024-11-08 NOTE — PROGRESS NOTE ADULT - ASSESSMENT
44 YOF with unclear PMH (?stroke, MI) who was found down at work, intubated for airway protection and found to have acute parenchymal hemorrhage within nabil with mass effect (+ acute/subacute right cerebellar infarct) in setting of hypertensive emergency, transferred to [facility] for further neurosurgical care. Hospital course c/b poor neurologic recovery s/p trach-PEG, AUR s/p gabriel, ANIKA on CKD c/b hyperkalemia, HAP s/p amp-sulbactam (EOT 10/23), K aerogenes bacteremia.

## 2024-11-08 NOTE — PROGRESS NOTE ADULT - SUBJECTIVE AND OBJECTIVE BOX
SUBJECTIVE: NAEON. Patient seen his morning - eyes open and ?tracking on right, non-verbal.       DIET:  Diet, NPO with Tube Feed:   Tube Feeding Modality: Gastrostomy  Nepro with Carb Steady (NEPRORTH)  Total Volume for 24 Hours (mL): 1080  Total Number of Cans: 5  Continuous  Starting Tube Feed Rate mL per Hour: 45  Increase Tube Feed Rate by (mL): 10     Every 4 hours  Until Goal Tube Feed Rate (mL per Hour): 60  Tube Feed Duration (in Hours): 18  Tube Feed Start Time: 10:00  Tube Feed Stop Time: 04:00  Banatrol TF     Qty per Day:  2 (10-28-24 @ 16:11) [Active]      MEDICATIONS:  MEDICATIONS  (STANDING):  acetylcysteine 10%  Inhalation 4 milliLiter(s) Inhalation every 6 hours  albuterol/ipratropium for Nebulization 3 milliLiter(s) Nebulizer every 6 hours  amLODIPine   Tablet 10 milliGRAM(s) Oral daily  artificial tears (preservative free) Ophthalmic Solution 1 Drop(s) Right EYE every 4 hours  calcium acetate 667 milliGRAM(s) Oral three times a day with meals  cefepime   IVPB 2000 milliGRAM(s) IV Intermittent every 12 hours  doxazosin 8 milliGRAM(s) Oral at bedtime  erythromycin   Ointment 1 Application(s) Right EYE every 6 hours  heparin   Injectable 5000 Unit(s) SubCutaneous every 8 hours  ofloxacin 0.3% Solution 1 Drop(s) Right EYE every 6 hours  petrolatum Ophthalmic Ointment 1 Application(s) Both EYES two times a day  polyethylene glycol 3350 17 Gram(s) Oral two times a day  sodium zirconium cyclosilicate 5 Gram(s) Oral daily    MEDICATIONS  (PRN):  acetaminophen   Oral Liquid .. 650 milliGRAM(s) Oral every 6 hours PRN Temp greater or equal to 38C (100.4F), Mild Pain (1 - 3)      Allergies    Allergy Status Unknown    Intolerances        OBJECTIVE:  Vital Signs Last 24 Hrs  T(C): 37.1 (08 Nov 2024 11:53), Max: 37.6 (07 Nov 2024 16:00)  T(F): 98.7 (08 Nov 2024 11:53), Max: 99.6 (07 Nov 2024 16:00)  HR: 90 (08 Nov 2024 11:53) (75 - 98)  BP: 124/86 (08 Nov 2024 11:53) (124/86 - 150/98)  BP(mean): 99 (08 Nov 2024 11:53) (97 - 99)  RR: 16 (08 Nov 2024 11:53) (16 - 17)  SpO2: 97% (08 Nov 2024 11:53) (95% - 99%)    Parameters below as of 08 Nov 2024 10:39  Patient On (Oxygen Delivery Method): tracheostomy collar    O2 Concentration (%): 35  I&O's Summary    07 Nov 2024 07:01  -  08 Nov 2024 07:00  --------------------------------------------------------  IN: 760 mL / OUT: 1625 mL / NET: -865 mL    08 Nov 2024 07:01  -  08 Nov 2024 13:56  --------------------------------------------------------  IN: 0 mL / OUT: 250 mL / NET: -250 mL        PHYSICAL EXAM:  Gen: resting comfortably, NAD  HEENT: NCAT, MMM  Neck: trach with minimal secretions   CV: RRR, no m/r/g, peripheral pulses 2+  Pulm: CTAB, no increased work of breathing, no rales/rhonchi  Abd: soft, ND, NT, PEG with abd binder  Skin: warm and dry  Ext: non-tender, no edema  Neuro: Eyes spontaneously open, does not follow commands  Psych: unable to assess       LABS:                        11.8   5.73  )-----------( 204      ( 08 Nov 2024 12:00 )             37.5     11-08    136  |  104  |  62[H]  ----------------------------<  103[H]  5.0   |  23  |  2.25[H]    Ca    9.4      08 Nov 2024 12:00  Phos  4.9     11-07  Mg     2.2     11-07          CAPILLARY BLOOD GLUCOSE        Urinalysis Basic - ( 08 Nov 2024 12:00 )    Color: x / Appearance: x / SG: x / pH: x  Gluc: 103 mg/dL / Ketone: x  / Bili: x / Urobili: x   Blood: x / Protein: x / Nitrite: x   Leuk Esterase: x / RBC: x / WBC x   Sq Epi: x / Non Sq Epi: x / Bacteria: x        MICRODATA:    Culture - Blood (collected 06 Nov 2024 20:05)  Source: .Blood BLOOD  Preliminary Report (08 Nov 2024 06:01):    No growth at 24 hours        RADIOLOGY/OTHER STUDIES:  Reviewed

## 2024-11-08 NOTE — PROGRESS NOTE ADULT - SUBJECTIVE AND OBJECTIVE BOX
HPI:  Unknown age male, unknown past medical history (reported stroke and MI by coworkers) presented to [facility] with AMS, Pt was working at [employer] when he was found down by coworkers. EMS called and pt brought to [facility] ED. Intubated, sedated, started on cardene for SBPs in 200s. CT head showed brain stem bleed. Transferred to NSICU for further management.  (30 Sep 2024 12:55)    OVERNIGHT EVENTS: GEORGE overnight, neuro stable.    Hospital Course:  9/30: transferred from [facility]. A line placed. Versed dc'd. Cy Rader at bedside, states pt has family in [place], cannot confirm medications or PMH other than stroke and MI. 250cc bolus 3% given. LR switched to NS. hydralazine 25q8 started, 3% started, switched propofol to precedex   10/1: stability CTH done. Added labetalol, started TF. Palliative consulted. ethics consulted to determine surrogate. febrile 103, pan cx sent  10/2: BD 2, GEORGE overnight. TF resumed. Desatt'd to 80s, FiO2 inc. to 50. Fentanyl given, ABG, CXR ordered. Maxxed on precedex, started on propofol for DARIEN -4 - -5. Precedex dc'd. Duonebs, mucomyst, hypertonic added. 3% dc'd. Cardene dc'd. Start vanc/CTX. Increased labetalol 200q8. MRSA negative, dc'd vanc. ETT pulled back 2cm x 2, good positioning after confirmatory chest xray. Ethics attempting to establish HCP with family. Na 159, starting FW 250q6 for range 150-155.   10/3: BD3, GEORGE o/n, neuro stable. Na elevating, FW increased to 300q6. Dc'd bowel reg for diarrhea. vEEG started. SQH 5000q8 tonight.   10/4: BD 4, albumn bolus, incr. LR to 80 2/2 incr. in Cr, LR to 100cc/hr for uptrending Cr. Started 7% hypersal for 48hrs and SL atropine for inline/oral thick secretions. Dc'd CTX and started ancef for MSSA in the sputum. Nephrology consulted for CKD, f/u recs. SBP 170s, given hydralazine 10mg IVP.   10/5: BD5, o/n 10mg IVP hydralazine given for SBP 170s and started on hydralazine 25q8 via OGT. 10mg IV push labetalol for SBP > 160s. RT placed for diarrhea.   10/6: BD6, o/n FW increased to 350q4 per nephrology recs. IV tylenol for temp 100.6, SBp 160s presumed uncomfortable.   10/7: BD7, overnight pancultured for temp 101.8F.   10/8: BD8. GEORGE. Cr bumped. decreased LR to 75cc/hr. Adding simethicone ATC. incr hydralazine 50mgTID. Incr labetalol 300mgTID. Na 145, decreased FWF to 250q6. Start precedex. FENa consistent with intrinsic kidney injury. Pend repeat renal US. Retaining up to 1.3L, bladder scans q6, straight cath PRN  10/9: BD 9. GEORGE overnight. Neuro stable. abd xray for distention w non-specific gas pattern, OGT to LIWS for morning. duonebs/mucomyst to q8 for improving secretions. Changed tube feeds to Jevity 1.5 20cc/hr, low rate due to abdominal distention, nepro dense and more difficult to digest. Tolerating CPAP, confirmed by ABG.   10/10: BD 10. GEORGE overnight. Neuro stable. (+) gabriel for urinary retention on bladder scan. inc TF to goal rate of 40cc/hr. family leaning toward pursuing trach/PEG. 1/2 amp for FS 81.   10/11: BD 11. GEORGE overnight. Neuro stable. Trach/PEG consults placed.   10/12: BD 12. GEORGE overnight. Neuro stable. MRI brain complete.   10/13: BD 13. Increase flomax. Hold SQH after PM dose for trach tm. IVL.   10/14: BD 14. GEORGE overnight, remains on AC/VC. Gabriel placed for urinary retention. Dc'd free water.  S/p trach with pulm. NGT placed and CXR confirmed in good position.   10/15: BD 15, GEORGE ovn. resumed feeds. spiked 101, pan cx sent.   10/16: BD 16. GEORGE ovn. Lokelma 5mg for K+ 5.4. Started vanc q 24/zosyn for empiric PNA coverage, IVF to 100/hr. PEG held for fever.   10/17: BD 17,  ordered serum osm and urine osm for am. Started sinemet for neurostimulation. Increased cardura to 0.8. Started FW 100q4, dc'd IVF. MRSA negative, dc'd vanc. NGT replaced d/t coiling.   10/18: BD 18, GEORGE overnight, neuro stable. Amantadine added for neurostim. zosyn changed to unasyn for acinetobacter baumannii, failed TOV and required SC  10/19: BD 19, GEORGE ovn. cardura 2mg added for retention. labetalol decreased 200q8, hydralazine decreased 25q8. Gabriel replaced.   10/20: BD20, GEORGE overnight. NGT dislodged, replaced. PEG tomorrow w/ gen surg, FW increased to 150q4 and labetalol decreased to 100q8, lokelma given for hyperkalemia.   10/21: BD 21. POD0 PEG placement with Gen surg. decr labetolol to 50q8, incr. cardura to 0.4, started lokelma and phoslo, dc gabriel POD0 PEG placement with Gen surg.  10/22: BD 22. Plan to start TF today via PEG. dc labetalol, Following ophtho recs. Increased apnea settings - found to be in cheyne-moe respiration. CPAP 5/5.  10/23: BD 23. hydralazine d/c'd, trach collar trial today. Rectal tube placed at 6am.  10/24: BD24, o/n lokelma held due to diarrhea. Free water 100q6 resumed. dc'd tamsulosin, amantadine. Incr'd cardura to 8mg qhs. Dc'd FW. Switched jevity to nepro. gabriel placed for high urine output. Started SL atropine for oral secretions. Dc'd free water.  10/25: BD25, o/n decreased suctioning requirements to > q4hrs, GEORGE. Cr improving, cont phoslo, lokelma held at this time. Gabriel placed yest, cont. Tolerating trach collar. Given 500cc plasmalyte bolus for ANIKA. Dc'd sinemet.   10/26: BD26, o/n resumed lokelma 5mg daily and resumed 100cc free water q6hrs. Change in neuro status with new right pupillary dilation with anisocoria (right pupil 6mm fixed and left pupil 3mm briskly reactive). Given 23.4% NaCl bullet, taken for emergent CTH showing mostly resolved pontine hemorrhage, continued brainstem hypodensity likely edema d/t hemorrhage, no new hemorrhage or infarct, no herniation, mild increase in size of left lateral ventricle. Vitals remaine stable. Na goal > 140.   10/27: BD27, o/n GEORGE.Neuro stable. Pend stepdown with airway bed.   10/28: BD 28. GEORGE overnight. Neuro stable. Miralax ordered. Gabriel removed, pending TOV.  10/29: BD 29. GEORGE o/n. Given 2L NS over 8 hrs for increased BUN/Cr ratio. Gabriel placed for frequent straight cath.   10/30: BD 30.   10/31: BD 31. GEORGE overnight. Na 149, increased free water to 200q6. 1L NS for uptrending BUN.   11/1: BD 32. GEORGE overnight. Given 1L NS for dehydration. Na 146, increased FW to 250q6.   11/2: BD 33 GEORGE overnight, neuro stable, given 500cc bolus for net negative status and tachycardia   11/3: BD 34, GEORGE overnight, neuro stable. Patient remains tachycardic, EKG showing sinus tachycardia, given additional 500cc NS bolus. Febrile to 101.9F, pan cultured (without UA), CXR WNL, given tylenol.   11/4: BD 35, GEORGE overnight, neuro stable. Given 1L NS for tachycardia. sputum (+) for stenotropohomas maltophilia.   11/5: BD 36 GEORGE overnight, neuro stable. Vancomycin dc'd. Chest PT BID. ID consulted, cont zosyn.  11/6: BD 37. blood cx + klebsiella dc zosyn changed to cefepime, CTAP ordered, rpt blood cx sent.    11/7: BD 38. Pending CT A/P, given 250cc bolus and starting maintenance fluids overnight. Pending CT A/P after bolus   11/8: BD 39.     Vital Signs Last 24 Hrs  T(C): 36.4 (07 Nov 2024 23:58), Max: 37.6 (07 Nov 2024 16:00)  T(F): 97.6 (07 Nov 2024 23:58), Max: 99.6 (07 Nov 2024 16:00)  HR: 88 (07 Nov 2024 23:58) (88 - 98)  BP: 139/92 (07 Nov 2024 23:58) (129/93 - 141/85)  BP(mean): 106 (07 Nov 2024 11:26) (106 - 106)  RR: 16 (07 Nov 2024 23:58) (16 - 92)  SpO2: 96% (07 Nov 2024 23:58) (95% - 99%)    Parameters below as of 07 Nov 2024 23:58  Patient On (Oxygen Delivery Method): tracheostomy collar  O2 Flow (L/min): 10  O2 Concentration (%): 35    I&O's Summary    06 Nov 2024 07:01  -  07 Nov 2024 07:00  --------------------------------------------------------  IN: 1545 mL / OUT: 1850 mL / NET: -305 mL    07 Nov 2024 07:01  -  08 Nov 2024 00:54  --------------------------------------------------------  IN: 0 mL / OUT: 1075 mL / NET: -1075 mL      PHYSICAL EXAM:  Constitutional: awake, alert, sitting up in bed. NAD.   Respiratory: +trach, non-labored breathing. Normal chest rise.   Cardiovascular: Regular rate and rhythm  Gastrointestinal: +peg, Soft, nontender, nondistended.  .  Vascular: Extremities warm, no ulcers, no discoloration of skin.   Neurological: Gen: AA&O x 0, OE spontaneously, intermittently FC     Vertical EOMI intact, no horizontal EOMI.     Motor: RUE 4+/5 spontaneously, RLE wiggles toes, LUE 0/5, LLE withdraws     Sens: Sensation intact to light touch throughout.    Extremities: warm and well perfused       TUBES/LINES:  [] Gabriel  [] Lumbar Drain  [] Wound Drains  [] Others  [x] trach  [x] peg      DIET:  [] NPO  [] Mechanical  [x] Tube feeds    LABS:                        11.6   6.51  )-----------( 167      ( 07 Nov 2024 08:16 )             37.5     11-07    139  |  104  |  63[H]  ----------------------------<  123[H]  4.8   |  19[L]  |  2.48[H]    Ca    9.5      07 Nov 2024 08:16  Phos  4.9     11-07  Mg     2.2     11-07        Urinalysis Basic - ( 07 Nov 2024 08:16 )    Color: x / Appearance: x / SG: x / pH: x  Gluc: 123 mg/dL / Ketone: x  / Bili: x / Urobili: x   Blood: x / Protein: x / Nitrite: x   Leuk Esterase: x / RBC: x / WBC x   Sq Epi: x / Non Sq Epi: x / Bacteria: x          CAPILLARY BLOOD GLUCOSE          Drug Levels: [] N/A    CSF Analysis: [] N/A      Allergies    Allergy Status Unknown    Intolerances      MEDICATIONS:  Antibiotics:  cefepime   IVPB 2000 milliGRAM(s) IV Intermittent every 12 hours    Neuro:  acetaminophen   Oral Liquid .. 650 milliGRAM(s) Oral every 6 hours PRN    Anticoagulation:  heparin   Injectable 5000 Unit(s) SubCutaneous every 8 hours    OTHER:  acetylcysteine 10%  Inhalation 4 milliLiter(s) Inhalation every 12 hours  albuterol/ipratropium for Nebulization 3 milliLiter(s) Nebulizer every 12 hours  amLODIPine   Tablet 10 milliGRAM(s) Oral daily  artificial tears (preservative free) Ophthalmic Solution 1 Drop(s) Right EYE every 4 hours  doxazosin 8 milliGRAM(s) Oral at bedtime  erythromycin   Ointment 1 Application(s) Right EYE every 6 hours  ofloxacin 0.3% Solution 1 Drop(s) Right EYE every 6 hours  petrolatum Ophthalmic Ointment 1 Application(s) Both EYES two times a day  polyethylene glycol 3350 17 Gram(s) Oral two times a day  sodium zirconium cyclosilicate 5 Gram(s) Oral daily    IVF:  calcium acetate 667 milliGRAM(s) Oral three times a day with meals  sodium chloride 0.9%. 1000 milliLiter(s) IV Continuous <Continuous>    CULTURES:  Culture Results:   Moderate Stenotrophomonas maltophilia  Commensal iram consistent with body site (11-03 @ 16:05)  Culture Results:   No growth at 4 days (11-03 @ 15:46)    RADIOLOGY & ADDITIONAL TESTS:      ASSESSMENT:  45 y/o PMH ?stroke/MI present to [facility] after collapsing at work. Decorticate posturing, vomiting, intubated for airway protection. 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 Transferred to [facility] for further management. s/p trach 10/14. s/p peg 10/21.        AMS    Handoff    MEWS Score    Acute myocardial infarction    CVA (cerebral vascular accident)    Intracerebral hemorrhage of brain stem    Brainstem stroke    Brain stem stroke syndrome    Brain stem hemorrhage    Brain stem stroke syndrome    Hemorrhagic stroke    Brainstem stroke    Encephalopathy acute    Functional quadriplegia    Advanced care planning/counseling discussion    Encounter for palliative care    Pontine hemorrhage    Neurogenic dysphagia    Chronic respiratory failure    Acute kidney injury superimposed on CKD    Acute urinary retention    Hypertensive emergency    Sepsis, unspecified organism    Sepsis    Gram-negative bacteremia    Percutaneous tracheal puncture    Altered mental status examination    EGD, with PEG    AMS    [email or web address]        PLAN:  Neuro:  - Neuro/vitals q8hr   - pain control: Tylenol prn, oxy prn  - CTH 9/30: enlarged pontine hemorrhage, CTH 10/3: read stable, CTH 10/25 anisocoric pupils (R sluggish 6mm compared to left 3mm briskly reactive); showing mostly resolved pontine hemorrhage, continued brainstem hypodensity likely edema d/t hemorrhage, no new hemorrhage or infarct, no herniation, mild increase in size of left lateral ventricle  - vEEG (10/3-4)- negative, (10/17-10/19) - negative  - stroke core measures, stroke neuro signed off  - MRI brain w/ w/o contrast 10/12: parenchymal hemorrhage, acute/subacute R cerebellar stroke      CV:  - -160  - HTN: amlodipine 10 mg qd  - echo (9/30) EF 75%    Resp:  - tolerating trach collar 35%  - duonebs/mucomyst q12h  - chest PT vest BID    GI:  - Nepro TF via PEG (placed 10/21 by gen surg)  - bowel regimen, last BM 11/3  - CTAP pending     Renal:  - gabriel placed 10/29 d/t urinary retention - to be replaced 11/7  - FW flushes 250cc q6hrs   - hyperK 10/20: lokelma 5mg qd  - hyperPhos: phoslo 667mg TID   - Urinary retenion: Cardura 8 mg   - CKD: trend BUN/Cr  - Na goal 135-145  - renal US 10/1: echogenicity c/w chronic med renal dz, repeat 10/8: inc renal echogeicity, c/f medical renal disease w increased hydro     Endo:  - A1c 5.4    Heme:  - DVT ppx: SCDs, SQH 5000u q8h     ID:  - pan cultured 11/3 (w/o UA) , (+) sputum for stenotrophomas maltophlia, blood cx (+) klebsiella, cefepime 2gq12 (11/6 -)  - ID following  - empiric tx: s/p zosyn (11/3-11/6) , s/p vanc  (11/3- 11/5)  - S/p Ancef (10/4-10/14) for PNA, and s/p Unasyn (10/18-10/23) +actinobacter baumanii    MISC:  - ophtho consult for keratitis, rec erythromycin ointment to rt eye q4hrs, ofloxacin ointment to rt eye QID, artificial tears to rt eye q2hrs, moisture chamber at bedtime    Dispo: regional, full code, pending placement and emergency medicaid     D/w Dr. D'Amico

## 2024-11-09 LAB
ANION GAP SERPL CALC-SCNC: 13 MMOL/L — SIGNIFICANT CHANGE UP (ref 5–17)
BUN SERPL-MCNC: 68 MG/DL — HIGH (ref 7–23)
CALCIUM SERPL-MCNC: 9.7 MG/DL — SIGNIFICANT CHANGE UP (ref 8.4–10.5)
CHLORIDE SERPL-SCNC: 106 MMOL/L — SIGNIFICANT CHANGE UP (ref 96–108)
CO2 SERPL-SCNC: 21 MMOL/L — LOW (ref 22–31)
CREAT SERPL-MCNC: 2.52 MG/DL — HIGH (ref 0.5–1.3)
EGFR: 31 ML/MIN/1.73M2 — LOW
EGFR: 31 ML/MIN/1.73M2 — LOW
GLUCOSE SERPL-MCNC: 112 MG/DL — HIGH (ref 70–99)
HCT VFR BLD CALC: 38.3 % — LOW (ref 39–50)
HGB BLD-MCNC: 12.1 G/DL — LOW (ref 13–17)
MAGNESIUM SERPL-MCNC: 2.8 MG/DL — HIGH (ref 1.6–2.6)
MCHC RBC-ENTMCNC: 29.7 PG — SIGNIFICANT CHANGE UP (ref 27–34)
MCHC RBC-ENTMCNC: 31.6 G/DL — LOW (ref 32–36)
MCV RBC AUTO: 94.1 FL — SIGNIFICANT CHANGE UP (ref 80–100)
NRBC # BLD: 0 /100 WBCS — SIGNIFICANT CHANGE UP (ref 0–0)
NRBC BLD-RTO: 0 /100 WBCS — SIGNIFICANT CHANGE UP (ref 0–0)
PHOSPHATE SERPL-MCNC: 4 MG/DL — SIGNIFICANT CHANGE UP (ref 2.5–4.5)
PLATELET # BLD AUTO: 205 K/UL — SIGNIFICANT CHANGE UP (ref 150–400)
POTASSIUM SERPL-MCNC: 4.6 MMOL/L — SIGNIFICANT CHANGE UP (ref 3.5–5.3)
POTASSIUM SERPL-SCNC: 4.6 MMOL/L — SIGNIFICANT CHANGE UP (ref 3.5–5.3)
RBC # BLD: 4.07 M/UL — LOW (ref 4.2–5.8)
RBC # FLD: 14 % — SIGNIFICANT CHANGE UP (ref 10.3–14.5)
SODIUM SERPL-SCNC: 140 MMOL/L — SIGNIFICANT CHANGE UP (ref 135–145)
WBC # BLD: 6.22 K/UL — SIGNIFICANT CHANGE UP (ref 3.8–10.5)
WBC # FLD AUTO: 6.22 K/UL — SIGNIFICANT CHANGE UP (ref 3.8–10.5)

## 2024-11-09 PROCEDURE — 99232 SBSQ HOSP IP/OBS MODERATE 35: CPT

## 2024-11-09 PROCEDURE — 99233 SBSQ HOSP IP/OBS HIGH 50: CPT

## 2024-11-09 RX ADMIN — Medication 1 DROP(S): at 10:03

## 2024-11-09 RX ADMIN — AMLODIPINE BESYLATE 10 MILLIGRAM(S): 10 TABLET ORAL at 06:21

## 2024-11-09 RX ADMIN — Medication 1 DROP(S): at 06:23

## 2024-11-09 RX ADMIN — Medication 1 APPLICATION(S): at 18:18

## 2024-11-09 RX ADMIN — ACETYLCYSTEINE 4 MILLILITER(S): 200 INHALANT RESPIRATORY (INHALATION) at 18:16

## 2024-11-09 RX ADMIN — POLYETHYLENE GLYCOL 3350 17 GRAM(S): 17 POWDER, FOR SOLUTION ORAL at 06:27

## 2024-11-09 RX ADMIN — ERYTHROMYCIN 1 APPLICATION(S): 5 OINTMENT OPHTHALMIC at 06:23

## 2024-11-09 RX ADMIN — Medication 1 APPLICATION(S): at 10:04

## 2024-11-09 RX ADMIN — Medication 1 DROP(S): at 02:02

## 2024-11-09 RX ADMIN — Medication 1 DROP(S): at 22:23

## 2024-11-09 RX ADMIN — HEPARIN SODIUM 5000 UNIT(S): 1000 INJECTION INTRAVENOUS; SUBCUTANEOUS at 14:43

## 2024-11-09 RX ADMIN — DOXAZOSIN MESYLATE 8 MILLIGRAM(S): 8 TABLET ORAL at 22:22

## 2024-11-09 RX ADMIN — IPRATROPIUM BROMIDE AND ALBUTEROL SULFATE 3 MILLILITER(S): .5; 2.5 SOLUTION RESPIRATORY (INHALATION) at 00:12

## 2024-11-09 RX ADMIN — ACETYLCYSTEINE 4 MILLILITER(S): 200 INHALANT RESPIRATORY (INHALATION) at 00:13

## 2024-11-09 RX ADMIN — Medication 667 MILLIGRAM(S): at 18:18

## 2024-11-09 RX ADMIN — POLYETHYLENE GLYCOL 3350 17 GRAM(S): 17 POWDER, FOR SOLUTION ORAL at 18:18

## 2024-11-09 RX ADMIN — ACETYLCYSTEINE 4 MILLILITER(S): 200 INHALANT RESPIRATORY (INHALATION) at 12:21

## 2024-11-09 RX ADMIN — ERYTHROMYCIN 1 APPLICATION(S): 5 OINTMENT OPHTHALMIC at 00:13

## 2024-11-09 RX ADMIN — ACETYLCYSTEINE 4 MILLILITER(S): 200 INHALANT RESPIRATORY (INHALATION) at 06:18

## 2024-11-09 RX ADMIN — OFLOXACIN 1 DROP(S): 3 SOLUTION OPHTHALMIC at 02:01

## 2024-11-09 RX ADMIN — IPRATROPIUM BROMIDE AND ALBUTEROL SULFATE 3 MILLILITER(S): .5; 2.5 SOLUTION RESPIRATORY (INHALATION) at 12:21

## 2024-11-09 RX ADMIN — Medication 1 DROP(S): at 18:17

## 2024-11-09 RX ADMIN — OFLOXACIN 1 DROP(S): 3 SOLUTION OPHTHALMIC at 18:17

## 2024-11-09 RX ADMIN — OFLOXACIN 1 DROP(S): 3 SOLUTION OPHTHALMIC at 12:21

## 2024-11-09 RX ADMIN — CEFEPIME 100 MILLIGRAM(S): 2 INJECTION, POWDER, FOR SOLUTION INTRAVENOUS at 06:27

## 2024-11-09 RX ADMIN — IPRATROPIUM BROMIDE AND ALBUTEROL SULFATE 3 MILLILITER(S): .5; 2.5 SOLUTION RESPIRATORY (INHALATION) at 06:17

## 2024-11-09 RX ADMIN — IPRATROPIUM BROMIDE AND ALBUTEROL SULFATE 3 MILLILITER(S): .5; 2.5 SOLUTION RESPIRATORY (INHALATION) at 18:17

## 2024-11-09 RX ADMIN — Medication 667 MILLIGRAM(S): at 10:04

## 2024-11-09 RX ADMIN — ERYTHROMYCIN 1 APPLICATION(S): 5 OINTMENT OPHTHALMIC at 12:21

## 2024-11-09 RX ADMIN — CEFEPIME 100 MILLIGRAM(S): 2 INJECTION, POWDER, FOR SOLUTION INTRAVENOUS at 18:17

## 2024-11-09 RX ADMIN — ERYTHROMYCIN 1 APPLICATION(S): 5 OINTMENT OPHTHALMIC at 18:17

## 2024-11-09 RX ADMIN — SODIUM ZIRCONIUM CYCLOSILICATE 5 GRAM(S): 5 POWDER, FOR SUSPENSION ORAL at 12:21

## 2024-11-09 RX ADMIN — OFLOXACIN 1 DROP(S): 3 SOLUTION OPHTHALMIC at 06:24

## 2024-11-09 RX ADMIN — Medication 667 MILLIGRAM(S): at 14:43

## 2024-11-09 RX ADMIN — Medication 1 DROP(S): at 14:43

## 2024-11-09 RX ADMIN — HEPARIN SODIUM 5000 UNIT(S): 1000 INJECTION INTRAVENOUS; SUBCUTANEOUS at 06:23

## 2024-11-09 RX ADMIN — HEPARIN SODIUM 5000 UNIT(S): 1000 INJECTION INTRAVENOUS; SUBCUTANEOUS at 22:22

## 2024-11-09 NOTE — PROGRESS NOTE ADULT - SUBJECTIVE AND OBJECTIVE BOX
HPI:  Unknown age male, unknown past medical history (reported stroke and MI by coworkers) presented to [facility] with AMS, Pt was working at [employer] when he was found down by coworkers. EMS called and pt brought to [facility] ED. Intubated, sedated, started on cardene for SBPs in 200s. CT head showed brain stem bleed. Transferred to NSICU for further management.  (30 Sep 2024 12:55)    OVERNIGHT EVENTS: Seen and examined in [facility]. Unable to elicit any complaints at this time.     HOSPITAL COURSE:  9/30: transferred from [facility]. A line placed. Versed dc'd. Roomate Prasanth at bedside, states pt has family in [place], cannot confirm medications or PMH other than stroke and MI. 250cc bolus 3% given. LR switched to NS. hydralazine 25q8 started, 3% started, switched propofol to precedex   10/1: stability CTH done. Added labetalol, started TF. Palliative consulted. ethics consulted to determine surrogate. febrile 103, pan cx sent  10/2: BD 2, GEORGE overnight. TF resumed. Desatt'd to 80s, FiO2 inc. to 50. Fentanyl given, ABG, CXR ordered. Maxxed on precedex, started on propofol for DARIEN -4 - -5. Precedex dc'd. Duonebs, mucomyst, hypertonic added. 3% dc'd. Cardene dc'd. Start vanc/CTX. Increased labetalol 200q8. MRSA negative, dc'd vanc. ETT pulled back 2cm x 2, good positioning after confirmatory chest xray. Ethics attempting to establish HCP with family. Na 159, starting FW 250q6 for range 150-155.   10/3: BD3, GEORGE o/n, neuro stable. Na elevating, FW increased to 300q6. Dc'd bowel reg for diarrhea. vEEG started. SQH 5000q8 tonight.   10/4: BD 4, albumn bolus, incr. LR to 80 2/2 incr. in Cr, LR to 100cc/hr for uptrending Cr. Started 7% hypersal for 48hrs and SL atropine for inline/oral thick secretions. Dc'd CTX and started ancef for MSSA in the sputum. Nephrology consulted for CKD, f/u recs. SBP 170s, given hydralazine 10mg IVP.   10/5: BD5, o/n 10mg IVP hydralazine given for SBP 170s and started on hydralazine 25q8 via OGT. 10mg IV push labetalol for SBP > 160s. RT placed for diarrhea.   10/6: BD6, o/n FW increased to 350q4 per nephrology recs. IV tylenol for temp 100.6, SBp 160s presumed uncomfortable.   10/7: BD7, overnight pancultured for temp 101.8F.   10/8: BD8. GEORGE. Cr bumped. decreased LR to 75cc/hr. Adding simethicone ATC. incr hydralazine 50mgTID. Incr labetalol 300mgTID. Na 145, decreased FWF to 250q6. Start precedex. FENa consistent with intrinsic kidney injury. Pend repeat renal US. Retaining up to 1.3L, bladder scans q6, straight cath PRN  10/9: BD 9. GEORGE overnight. Neuro stable. abd xray for distention w non-specific gas pattern, OGT to LIWS for morning. duonebs/mucomyst to q8 for improving secretions. Changed tube feeds to Jevity 1.5 20cc/hr, low rate due to abdominal distention, nepro dense and more difficult to digest. Tolerating CPAP, confirmed by ABG.   10/10: BD 10. GEORGE overnight. Neuro stable. (+) gabriel for urinary retention on bladder scan. inc TF to goal rate of 40cc/hr. family leaning toward pursuing trach/PEG. 1/2 amp for FS 81.   10/11: BD 11. GEORGE overnight. Neuro stable. Trach/PEG consults placed.   10/12: BD 12. GEORGE overnight. Neuro stable. MRI brain complete.   10/13: BD 13. Increase flomax. Hold SQH after PM dose for trach tm. IVL.   10/14: BD 14. GEORGE overnight, remains on AC/VC. Gabriel placed for urinary retention. Dc'd free water.  S/p trach with pulm. NGT placed and CXR confirmed in good position.   10/15: BD 15, GEORGE ovn. resumed feeds. spiked 101, pan cx sent.   10/16: BD 16. GEORGE ovn. Lokelma 5mg for K+ 5.4. Started vanc q 24/zosyn for empiric PNA coverage, IVF to 100/hr. PEG held for fever.   10/17: BD 17,  ordered serum osm and urine osm for am. Started sinemet for neurostimulation. Increased cardura to 0.8. Started FW 100q4, dc'd IVF. MRSA negative, dc'd vanc. NGT replaced d/t coiling.   10/18: BD 18, GEORGE overnight, neuro stable. Amantadine added for neurostim. zosyn changed to unasyn for acinetobacter baumannii, failed TOV and required SC  10/19: BD 19, GEORGE ovn. cardura 2mg added for retention. labetalol decreased 200q8, hydralazine decreased 25q8. Gabriel replaced.   10/20: BD20, GEORGE overnight. NGT dislodged, replaced. PEG tomorrow w/ gen surg, FW increased to 150q4 and labetalol decreased to 100q8, lokelma given for hyperkalemia.   10/21: BD 21. POD0 PEG placement with Gen surg. decr labetolol to 50q8, incr. cardura to 0.4, started lokelma and phoslo, dc gabriel POD0 PEG placement with Gen surg.  10/22: BD 22. Plan to start TF today via PEG. dc labetalol, Following ophtho recs. Increased apnea settings - found to be in cheyne-moe respiration. CPAP 5/5.  10/23: BD 23. hydralazine d/c'd, trach collar trial today. Rectal tube placed at 6am.  10/24: BD24, o/n lokelma held due to diarrhea. Free water 100q6 resumed. dc'd tamsulosin, amantadine. Incr'd cardura to 8mg qhs. Dc'd FW. Switched jevity to nepro. gabriel placed for high urine output. Started SL atropine for oral secretions. Dc'd free water.  10/25: BD25, o/n decreased suctioning requirements to > q4hrs, GEORGE. Cr improving, cont phoslo, lokelma held at this time. Gabriel placed yest, cont. Tolerating trach collar. Given 500cc plasmalyte bolus for ANIKA. Dc'd sinemet.   10/26: BD26, o/n resumed lokelma 5mg daily and resumed 100cc free water q6hrs. Change in neuro status with new right pupillary dilation with anisocoria (right pupil 6mm fixed and left pupil 3mm briskly reactive). Given 23.4% NaCl bullet, taken for emergent CTH showing mostly resolved pontine hemorrhage, continued brainstem hypodensity likely edema d/t hemorrhage, no new hemorrhage or infarct, no herniation, mild increase in size of left lateral ventricle. Vitals remaine stable. Na goal > 140.   10/27: BD27, o/n GEORGE.Neuro stable. Pend stepdown with airway bed.   10/28: BD 28. GEORGE overnight. Neuro stable. Miralax ordered. Gabriel removed, pending TOV.  10/29: BD 29. GEORGE o/n. Given 2L NS over 8 hrs for increased BUN/Cr ratio. Gabriel placed for frequent straight cath.   10/30: BD 30.   10/31: BD 31. GEORGE overnight. Na 149, increased free water to 200q6. 1L NS for uptrending BUN.   11/1: BD 32. GEORGE overnight. Given 1L NS for dehydration. Na 146, increased FW to 250q6.   11/2: BD 33 GEORGE overnight, neuro stable, given 500cc bolus for net negative status and tachycardia   11/3: BD 34, GEORGE overnight, neuro stable. Patient remains tachycardic, EKG showing sinus tachycardia, given additional 500cc NS bolus. Febrile to 101.9F, pan cultured (without UA), CXR WNL, given tylenol.   11/4: BD 35, GEORGE overnight, neuro stable. Given 1L NS for tachycardia. sputum (+) for stenotropohomas maltophilia.   11/5: BD 36 GEORGE overnight, neuro stable. Vancomycin dc'd. Chest PT BID. ID consulted, cont zosyn.  11/6: BD 37. blood cx + klebsiella dc zosyn changed to cefepime, CTAP ordered, rpt blood cx sent.    11/7: BD 38. Pending CT A/P, given 250cc bolus and starting maintenance fluids overnight. Pending CT A/P after bolus   11/8: BD 39. CT CAP negative for infection.   11/9: BD 40. GEORGE overnight.    Vital Signs Last 24 Hrs  T(C): 37.1 (08 Nov 2024 20:50), Max: 37.1 (08 Nov 2024 11:53)  T(F): 98.8 (08 Nov 2024 20:50), Max: 98.8 (08 Nov 2024 20:50)  HR: 96 (08 Nov 2024 20:50) (75 - 96)  BP: 126/83 (08 Nov 2024 20:50) (124/86 - 150/98)  BP(mean): 97 (08 Nov 2024 20:50) (97 - 99)  RR: 20 (08 Nov 2024 20:50) (16 - 20)  SpO2: 96% (08 Nov 2024 20:50) (95% - 99%)    Parameters below as of 08 Nov 2024 20:50  Patient On (Oxygen Delivery Method): tracheostomy collar  O2 Flow (L/min): 10  O2 Concentration (%): 35    I&O's Summary    07 Nov 2024 07:01  -  08 Nov 2024 07:00  --------------------------------------------------------  IN: 760 mL / OUT: 1625 mL / NET: -865 mL    08 Nov 2024 07:01  -  09 Nov 2024 01:02  --------------------------------------------------------  IN: 0 mL / OUT: 1050 mL / NET: -1050 mL        PHYSICAL EXAM:  General: NAD, pt is sitting up in bed, on trach collar to 35% FiO2  HEENT: PERRL 3mm briskly reactive, EOMI vertically b/l  Cardiovascular: RRR, S1, S2  Respiratory: breathing non-labored on trach collar, chest rise symmetric  GI: abd soft, NTND   Neuro: A&Ox0, intermittently follows commands.  RUE trace wiggles fingers to command, (+) muscle tone of RUE, RLE spontaneous wiggles toes, LUE 0/5, LLE w/d to noxious.   Unable to assess sensation secondary to mental status   Extremities: distal pulses 2+ x4  Wound/incision: n/a  Drain: (+) trach (+) PEG (+) gabriel    TUBES/LINES:  [x] Gabriel  [] Wound Drains  [x] Others- PEG, trach      DIET:  [] NPO  [] Mechanical  [x] Tube feeds    LABS:                        11.8   5.73  )-----------( 204      ( 08 Nov 2024 12:00 )             37.5     11-08    136  |  104  |  62[H]  ----------------------------<  103[H]  5.0   |  23  |  2.25[H]    Ca    9.4      08 Nov 2024 12:00  Phos  4.9     11-07  Mg     2.2     11-07        Urinalysis Basic - ( 08 Nov 2024 12:00 )    Color: x / Appearance: x / SG: x / pH: x  Gluc: 103 mg/dL / Ketone: x  / Bili: x / Urobili: x   Blood: x / Protein: x / Nitrite: x   Leuk Esterase: x / RBC: x / WBC x   Sq Epi: x / Non Sq Epi: x / Bacteria: x          CAPILLARY BLOOD GLUCOSE          Drug Levels: [] N/A    CSF Analysis: [] N/A      Allergies    Allergy Status Unknown    Intolerances      MEDICATIONS:  Antibiotics:  cefepime   IVPB 2000 milliGRAM(s) IV Intermittent every 12 hours    Neuro:  acetaminophen   Oral Liquid .. 650 milliGRAM(s) Oral every 6 hours PRN    Anticoagulation:  heparin   Injectable 5000 Unit(s) SubCutaneous every 8 hours    OTHER:  acetylcysteine 10%  Inhalation 4 milliLiter(s) Inhalation every 6 hours  albuterol/ipratropium for Nebulization 3 milliLiter(s) Nebulizer every 6 hours  amLODIPine   Tablet 10 milliGRAM(s) Oral daily  artificial tears (preservative free) Ophthalmic Solution 1 Drop(s) Right EYE every 4 hours  doxazosin 8 milliGRAM(s) Oral at bedtime  erythromycin   Ointment 1 Application(s) Right EYE every 6 hours  ofloxacin 0.3% Solution 1 Drop(s) Right EYE every 6 hours  petrolatum Ophthalmic Ointment 1 Application(s) Both EYES two times a day  polyethylene glycol 3350 17 Gram(s) Oral two times a day  sodium zirconium cyclosilicate 5 Gram(s) Oral daily    IVF:  calcium acetate 667 milliGRAM(s) Oral three times a day with meals    CULTURES:  Culture Results:   No growth at 24 hours (11-06 @ 20:05)  Culture Results:   Moderate Stenotrophomonas maltophilia  Commensal iram consistent with body site (11-03 @ 16:05)    RADIOLOGY & ADDITIONAL TESTS:  < from: CT Abdomen and Pelvis w/ IV Cont (11.08.24 @ 02:53) >  IMPRESSION:    1. Right basilar and left medial basilar atelectasis; however, cannot   exclude superimposed pneumonia. Partially visualized inspissated   secretions along with bibasilar distal airways.    2. No evidence of an infectious process within the abdomen and pelvis.        --- End of Report ---    < end of copied text >      ASSESSMENT:  43 y/o PMH ?stroke/MI present to [facility] after collapsing at work. Decorticate posturing, vomiting, intubated for airway protection. 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 Transferred to [facility] for further management. s/p trach 10/14. s/p peg 10/21.      AMS    Handoff    MEWS Score    Acute myocardial infarction    CVA (cerebral vascular accident)    Intracerebral hemorrhage of brain stem    Brainstem stroke    Brain stem stroke syndrome    Brain stem hemorrhage    Brain stem stroke syndrome    Hemorrhagic stroke    Brainstem stroke    Encephalopathy acute    Functional quadriplegia    Advanced care planning/counseling discussion    Encounter for palliative care    Pontine hemorrhage    Neurogenic dysphagia    Chronic respiratory failure    Acute kidney injury superimposed on CKD    Acute urinary retention    Hypertensive emergency    Sepsis, unspecified organism    Sepsis    Gram-negative bacteremia    Percutaneous tracheal puncture    Altered mental status examination    EGD, with PEG    AMS    [email or web address]        PLAN:  Neuro:  - Neuro q8/vitals q4hr   - pain control: Tylenol prn, oxy prn  - CTH 9/30: enlarged pontine hemorrhage, CTH 10/3: read stable, CTH 10/25 anisocoric pupils (R sluggish 6mm compared to left 3mm briskly reactive); showing mostly resolved pontine hemorrhage, continued brainstem hypodensity likely edema d/t hemorrhage, no new hemorrhage or infarct, no herniation, mild increase in size of left lateral ventricle  - vEEG (10/3-4)- negative, (10/17-10/19) - negative  - stroke core measures, stroke neuro signed off  - MRI brain w/ w/o contrast 10/12: parenchymal hemorrhage, acute/subacute R cerebellar stroke      CV:  - -160  - HTN: amlodipine 10 mg qd  - echo (9/30) EF 75%    Resp:  - tolerating trach collar 35%  - duonebs/mucomyst q6h  - chest PT vest BID    GI:  - Nepro TF via PEG (placed 10/21 by gen surg)  - bowel regimen, last BM 11/7  - CTAP 11/8 negative for infection      Renal:  - gabriel placed 10/29 d/t urinary retention - replaced 11/7  - FW flushes 250cc q6hrs   - hyperK 10/20: lokelma 5mg qd  - hyperPhos: phoslo 667mg TID   - Urinary retenion: Cardura 8 mg   - CKD: trend BUN/Cr  - Na goal 135-145  - renal US 10/1: echogenicity c/w chronic med renal dz, repeat 10/8: inc renal echogeicity, c/f medical renal disease w increased hydro     Endo:  - A1c 5.4    Heme:  - DVT ppx: SCDs, SQH 5000u q8h     ID:  - pan cultured 11/3 (w/o UA) , (+) sputum for stenotrophomas maltophlia, blood cx (+) klebsiella, cefepime 2gq12 (11/6 -)  - ID following  - empiric tx: s/p zosyn (11/3-11/6) , s/p vanc  (11/3- 11/5)  - S/p Ancef (10/4-10/14) for PNA, and s/p Unasyn (10/18-10/23) +actinobacter baumanii    MISC:  - ophtho consult for keratitis, rec erythromycin ointment to rt eye q4hrs, ofloxacin ointment to rt eye QID, artificial tears to rt eye q2hrs, moisture chamber at bedtime    Dispo: regional, full code, pending placement and emergency medicaid     D/w Dr. D'Amico   Assessment:  Present when checked    []  GCS  E   V  M     Heart Failure: []Acute, [] acute on chronic , []chronic  Heart Failure:  [] Diastolic (HFpEF), [] Systolic (HFrEF), []Combined (HFpEF and HFrEF), [] RHF, [] Pulm HTN, [] Other    [] ANIKA, [] ATN, [] AIN, [] other  [] CKD1, [] CKD2, [] CKD 3, [] CKD 4, [] CKD 5, []ESRD    Encephalopathy: [] Metabolic, [] Hepatic, [] toxic, [] Neurological, [] Other    Abnormal Nurtitional Status: [] malnurtition (see nutrition note), [ ]underweight: BMI < 19, [] morbid obesity: BMI >40, [] Cachexia    [] Sepsis  [] hypovolemic shock,[] cardiogenic shock, [] hemorrhagic shock, [] neuogenic shock  [] Acute Respiratory Failure  []Cerebral edema, [] Brain compression/ herniation,   [] Functional quadriplegia  [] Acute blood loss anemia

## 2024-11-09 NOTE — PROGRESS NOTE ADULT - ASSESSMENT
44 YOF with unclear PMH (?stroke, MI) who was found down at work, intubated for airway protection and found to have acute parenchymal hemorrhage within nabil with mass effect (+ acute/subacute right cerebellar infarct) in setting of hypertensive emergency, transferred to [facility] for further neurosurgical care. Hospital course c/b poor neurologic recovery s/p trach-PEG, AUR s/p gabriel, ANIKA on CKD c/b hyperkalemia, HAP s/p amp-sulbactam (EOT 10/23), K aerogenes bacteremia.       # Gram-negative bacteremia.   -Pt's sputum cx ws positive for S maltophilia thought colonizer per discussion with ID.  -Pt's blood xcx on 11/3 positive K aerogenes  - Source of bacteremia suspected  however there is no urine cx data given chronic gabriel (s/p exchanged 11/7)  - CTAP w/o evidence of intra-abdominal infection, surveillance blood cx sent on 11/6 was NGTD   -The pt was evaluated by ID and the  plan is for a 7-day course cefepime 2g q12 (11/6-11/12).    # Pontine hemorrhage.   -The pt was found to have acute parenchymal hemorrhage within nabil with mass effect (+ acute/subacute right cerebellar infarct) in setting of hypertensive emergency.   -The pt had an MRI brain also demonstrated acute/subacute R cerebellar stroke.   - Will continue management per NSGY  -Will continue pain management and bowel regimen as per NSGY team     # Hypertensive emergency ( resolved)  -Pt had a hypertensive emergency req nicardipine gtt in NSICU.   -Will continue  amlodipine 10mg daily     #Acute kidney injury superimposed on CKD.   -Pt s/p  US renal with chronic medical renal disease. Hospital course c/b ATN.   - Pt with persistent hyperkalemia now on lokelma 5g qd with improvement  -Pt  persistent hyperphosphatemia now on phoslo 776 mg TID with improvement  -Will continue to renally dose medications and avoid nephrotoxins  - Will continue to monitor creatinine slightly increased today; Creatinine 2.25->2.52     # Chronic respiratory failure.   -Pt s/p percutaneous trach by pulm on 10/14/24  - Will continue  trach collar   - Will continue routine trach care and suctioning PRN  - Will continue duo-neb with mucomyst q6hr standing     # Neurogenic dysphagia.   -Pt s/pPEG with surgery 10/21/24  - TF per nutrition  - aspiration precautions and elevation of HOB.    #Acute urinary retention.   -Pt s/p gabriel placement  10/24 for urinary retention, failed TOV 10/27.   - doxazosin 8mg.    # Functional quadriplegia in  setting of brainstem hemorrhage  - decub precautions.    #DVT prop  -Continue Heparin SQ q8     #DISPO  -TBD - pending PRUCOL application    55 minutes spent on total encounter. The necessity of the time spent during the encounter on this date of service was due to:    Review of hospital course, labs, vitals, medical records.  Bedside exam   Discussed plan of care with primary team   Documenting the encounter.

## 2024-11-09 NOTE — PROGRESS NOTE ADULT - SUBJECTIVE AND OBJECTIVE BOX
Patient was seen and examined at bedside. Case discuss with the primary team. Pt w/o any events overnight.     OBJECTIVE:  Vital Signs Last 24 Hrs  T(C): 37.3 (09 Nov 2024 16:10), Max: 37.3 (09 Nov 2024 16:10)  T(F): 99.1 (09 Nov 2024 16:10), Max: 99.1 (09 Nov 2024 16:10)  HR: 95 (09 Nov 2024 16:10) (90 - 100)  BP: 134/89 (09 Nov 2024 16:10) (117/80 - 134/89)  BP(mean): 104 (09 Nov 2024 16:10) (97 - 104)  RR: 16 (09 Nov 2024 16:10) (15 - 20)  SpO2: 96% (09 Nov 2024 16:10) (93% - 98%)    Parameters below as of 09 Nov 2024 09:08  Patient On (Oxygen Delivery Method): tracheostomy collar    PHYSICAL EXAM:  Gen: resting comfortably, NAD  HEENT: NCAT, MMM  Neck: trach with minimal secretions   CV: RRR, no m/r/g, peripheral pulses 2+  Pulm: CTAB, no increased work of breathing, no rales/rhonchi  Abd: soft, ND, NT, PEG with abd binder  Skin: warm and dry  Ext: non-tender, no edema  Neuro: Eyes spontaneously open, does not follow commands    LABS:                        12.1   6.22  )-----------( 205      ( 09 Nov 2024 05:30 )             38.3       140  |  106  |  68[H]  ----------------------------<  112[H]  4.6   |  21[L]  |  2.52[H]    Ca    9.7      09 Nov 2024 05:30  Phos  4.0     11-09  Mg     2.8     11-09      acetaminophen   Oral Liquid .. 650 milliGRAM(s) Oral every 6 hours PRN  acetylcysteine 10%  Inhalation 4 milliLiter(s) Inhalation every 6 hours  albuterol/ipratropium for Nebulization 3 milliLiter(s) Nebulizer every 6 hours  amLODIPine   Tablet 10 milliGRAM(s) Oral daily  artificial tears (preservative free) Ophthalmic Solution 1 Drop(s) Right EYE every 4 hours  calcium acetate 667 milliGRAM(s) Oral three times a day with meals  cefepime   IVPB 2000 milliGRAM(s) IV Intermittent every 12 hours  doxazosin 8 milliGRAM(s) Oral at bedtime  erythromycin   Ointment 1 Application(s) Right EYE every 6 hours  heparin   Injectable 5000 Unit(s) SubCutaneous every 8 hours  ofloxacin 0.3% Solution 1 Drop(s) Right EYE every 6 hours  petrolatum Ophthalmic Ointment 1 Application(s) Both EYES two times a day  polyethylene glycol 3350 17 Gram(s) Oral two times a day  sodium zirconium cyclosilicate 5 Gram(s) Oral daily

## 2024-11-10 LAB
ANION GAP SERPL CALC-SCNC: 15 MMOL/L — SIGNIFICANT CHANGE UP (ref 5–17)
BASE EXCESS BLDA CALC-SCNC: -1.1 MMOL/L — SIGNIFICANT CHANGE UP (ref -2–3)
BASE EXCESS BLDA CALC-SCNC: -2.4 MMOL/L — LOW (ref -2–3)
BASE EXCESS BLDA CALC-SCNC: -3.1 MMOL/L — LOW (ref -2–3)
BUN SERPL-MCNC: 70 MG/DL — HIGH (ref 7–23)
CALCIUM SERPL-MCNC: 9.6 MG/DL — SIGNIFICANT CHANGE UP (ref 8.4–10.5)
CHLORIDE SERPL-SCNC: 108 MMOL/L — SIGNIFICANT CHANGE UP (ref 96–108)
CO2 BLDA-SCNC: 22 MMOL/L — SIGNIFICANT CHANGE UP (ref 19–24)
CO2 BLDA-SCNC: 23 MMOL/L — SIGNIFICANT CHANGE UP (ref 19–24)
CO2 BLDA-SCNC: 24 MMOL/L — SIGNIFICANT CHANGE UP (ref 19–24)
CO2 SERPL-SCNC: 17 MMOL/L — LOW (ref 22–31)
CREAT SERPL-MCNC: 2.47 MG/DL — HIGH (ref 0.5–1.3)
EGFR: 32 ML/MIN/1.73M2 — LOW
EGFR: 32 ML/MIN/1.73M2 — LOW
GAS PNL BLDA: SIGNIFICANT CHANGE UP
GLUCOSE SERPL-MCNC: 129 MG/DL — HIGH (ref 70–99)
HCO3 BLDA-SCNC: 21 MMOL/L — SIGNIFICANT CHANGE UP (ref 21–28)
HCO3 BLDA-SCNC: 22 MMOL/L — SIGNIFICANT CHANGE UP (ref 21–28)
HCO3 BLDA-SCNC: 23 MMOL/L — SIGNIFICANT CHANGE UP (ref 21–28)
HCT VFR BLD CALC: 38.7 % — LOW (ref 39–50)
HGB BLD-MCNC: 12.5 G/DL — LOW (ref 13–17)
MAGNESIUM SERPL-MCNC: 2.6 MG/DL — SIGNIFICANT CHANGE UP (ref 1.6–2.6)
MCHC RBC-ENTMCNC: 29.8 PG — SIGNIFICANT CHANGE UP (ref 27–34)
MCHC RBC-ENTMCNC: 32.3 G/DL — SIGNIFICANT CHANGE UP (ref 32–36)
MCV RBC AUTO: 92.1 FL — SIGNIFICANT CHANGE UP (ref 80–100)
NRBC # BLD: 0 /100 WBCS — SIGNIFICANT CHANGE UP (ref 0–0)
NRBC BLD-RTO: 0 /100 WBCS — SIGNIFICANT CHANGE UP (ref 0–0)
PCO2 BLDA: 34 MMHG — LOW (ref 35–48)
PCO2 BLDA: 34 MMHG — LOW (ref 35–48)
PCO2 BLDA: 35 MMHG — SIGNIFICANT CHANGE UP (ref 35–48)
PH BLDA: 7.39 — SIGNIFICANT CHANGE UP (ref 7.35–7.45)
PH BLDA: 7.41 — SIGNIFICANT CHANGE UP (ref 7.35–7.45)
PH BLDA: 7.43 — SIGNIFICANT CHANGE UP (ref 7.35–7.45)
PHOSPHATE SERPL-MCNC: 3.3 MG/DL — SIGNIFICANT CHANGE UP (ref 2.5–4.5)
PLATELET # BLD AUTO: 211 K/UL — SIGNIFICANT CHANGE UP (ref 150–400)
PO2 BLDA: 79 MMHG — LOW (ref 83–108)
PO2 BLDA: 79 MMHG — LOW (ref 83–108)
PO2 BLDA: 90 MMHG — SIGNIFICANT CHANGE UP (ref 83–108)
POTASSIUM SERPL-MCNC: SIGNIFICANT CHANGE UP MMOL/L (ref 3.5–5.3)
POTASSIUM SERPL-SCNC: SIGNIFICANT CHANGE UP MMOL/L (ref 3.5–5.3)
RAPID RVP RESULT: SIGNIFICANT CHANGE UP
RBC # BLD: 4.2 M/UL — SIGNIFICANT CHANGE UP (ref 4.2–5.8)
RBC # FLD: 14 % — SIGNIFICANT CHANGE UP (ref 10.3–14.5)
SAO2 % BLDA: 97.6 % — SIGNIFICANT CHANGE UP (ref 94–98)
SAO2 % BLDA: 97.7 % — SIGNIFICANT CHANGE UP (ref 94–98)
SAO2 % BLDA: 98.3 % — HIGH (ref 94–98)
SARS-COV-2 RNA SPEC QL NAA+PROBE: SIGNIFICANT CHANGE UP
SODIUM SERPL-SCNC: 140 MMOL/L — SIGNIFICANT CHANGE UP (ref 135–145)
WBC # BLD: 8.19 K/UL — SIGNIFICANT CHANGE UP (ref 3.8–10.5)
WBC # FLD AUTO: 8.19 K/UL — SIGNIFICANT CHANGE UP (ref 3.8–10.5)

## 2024-11-10 PROCEDURE — 71045 X-RAY EXAM CHEST 1 VIEW: CPT | Mod: 26

## 2024-11-10 PROCEDURE — 99232 SBSQ HOSP IP/OBS MODERATE 35: CPT

## 2024-11-10 PROCEDURE — 99233 SBSQ HOSP IP/OBS HIGH 50: CPT

## 2024-11-10 RX ADMIN — OFLOXACIN 1 DROP(S): 3 SOLUTION OPHTHALMIC at 12:55

## 2024-11-10 RX ADMIN — IPRATROPIUM BROMIDE AND ALBUTEROL SULFATE 3 MILLILITER(S): .5; 2.5 SOLUTION RESPIRATORY (INHALATION) at 17:49

## 2024-11-10 RX ADMIN — Medication 1 DROP(S): at 10:04

## 2024-11-10 RX ADMIN — AMLODIPINE BESYLATE 10 MILLIGRAM(S): 10 TABLET ORAL at 06:23

## 2024-11-10 RX ADMIN — Medication 667 MILLIGRAM(S): at 10:04

## 2024-11-10 RX ADMIN — DOXAZOSIN MESYLATE 8 MILLIGRAM(S): 8 TABLET ORAL at 21:29

## 2024-11-10 RX ADMIN — ERYTHROMYCIN 1 APPLICATION(S): 5 OINTMENT OPHTHALMIC at 06:33

## 2024-11-10 RX ADMIN — ACETYLCYSTEINE 4 MILLILITER(S): 200 INHALANT RESPIRATORY (INHALATION) at 05:12

## 2024-11-10 RX ADMIN — HEPARIN SODIUM 5000 UNIT(S): 1000 INJECTION INTRAVENOUS; SUBCUTANEOUS at 06:20

## 2024-11-10 RX ADMIN — ACETYLCYSTEINE 4 MILLILITER(S): 200 INHALANT RESPIRATORY (INHALATION) at 12:55

## 2024-11-10 RX ADMIN — ERYTHROMYCIN 1 APPLICATION(S): 5 OINTMENT OPHTHALMIC at 00:44

## 2024-11-10 RX ADMIN — OFLOXACIN 1 DROP(S): 3 SOLUTION OPHTHALMIC at 17:50

## 2024-11-10 RX ADMIN — Medication 1 DROP(S): at 21:28

## 2024-11-10 RX ADMIN — IPRATROPIUM BROMIDE AND ALBUTEROL SULFATE 3 MILLILITER(S): .5; 2.5 SOLUTION RESPIRATORY (INHALATION) at 13:07

## 2024-11-10 RX ADMIN — CEFEPIME 100 MILLIGRAM(S): 2 INJECTION, POWDER, FOR SOLUTION INTRAVENOUS at 06:20

## 2024-11-10 RX ADMIN — Medication 1 APPLICATION(S): at 06:33

## 2024-11-10 RX ADMIN — ACETYLCYSTEINE 4 MILLILITER(S): 200 INHALANT RESPIRATORY (INHALATION) at 17:49

## 2024-11-10 RX ADMIN — POLYETHYLENE GLYCOL 3350 17 GRAM(S): 17 POWDER, FOR SOLUTION ORAL at 17:49

## 2024-11-10 RX ADMIN — HEPARIN SODIUM 5000 UNIT(S): 1000 INJECTION INTRAVENOUS; SUBCUTANEOUS at 21:28

## 2024-11-10 RX ADMIN — OFLOXACIN 1 DROP(S): 3 SOLUTION OPHTHALMIC at 00:44

## 2024-11-10 RX ADMIN — HEPARIN SODIUM 5000 UNIT(S): 1000 INJECTION INTRAVENOUS; SUBCUTANEOUS at 14:36

## 2024-11-10 RX ADMIN — OFLOXACIN 1 DROP(S): 3 SOLUTION OPHTHALMIC at 06:32

## 2024-11-10 RX ADMIN — Medication 667 MILLIGRAM(S): at 17:49

## 2024-11-10 RX ADMIN — ERYTHROMYCIN 1 APPLICATION(S): 5 OINTMENT OPHTHALMIC at 12:55

## 2024-11-10 RX ADMIN — Medication 1 DROP(S): at 02:06

## 2024-11-10 RX ADMIN — Medication 650 MILLIGRAM(S): at 07:31

## 2024-11-10 RX ADMIN — Medication 650 MILLIGRAM(S): at 06:23

## 2024-11-10 RX ADMIN — Medication 1 DROP(S): at 17:50

## 2024-11-10 RX ADMIN — CEFEPIME 100 MILLIGRAM(S): 2 INJECTION, POWDER, FOR SOLUTION INTRAVENOUS at 17:44

## 2024-11-10 RX ADMIN — ERYTHROMYCIN 1 APPLICATION(S): 5 OINTMENT OPHTHALMIC at 17:49

## 2024-11-10 RX ADMIN — POLYETHYLENE GLYCOL 3350 17 GRAM(S): 17 POWDER, FOR SOLUTION ORAL at 06:32

## 2024-11-10 RX ADMIN — Medication 1 APPLICATION(S): at 18:17

## 2024-11-10 RX ADMIN — Medication 1 DROP(S): at 14:36

## 2024-11-10 RX ADMIN — IPRATROPIUM BROMIDE AND ALBUTEROL SULFATE 3 MILLILITER(S): .5; 2.5 SOLUTION RESPIRATORY (INHALATION) at 01:10

## 2024-11-10 RX ADMIN — SODIUM ZIRCONIUM CYCLOSILICATE 5 GRAM(S): 5 POWDER, FOR SUSPENSION ORAL at 12:55

## 2024-11-10 RX ADMIN — Medication 667 MILLIGRAM(S): at 14:36

## 2024-11-10 RX ADMIN — ACETYLCYSTEINE 4 MILLILITER(S): 200 INHALANT RESPIRATORY (INHALATION) at 01:10

## 2024-11-10 RX ADMIN — IPRATROPIUM BROMIDE AND ALBUTEROL SULFATE 3 MILLILITER(S): .5; 2.5 SOLUTION RESPIRATORY (INHALATION) at 05:13

## 2024-11-10 RX ADMIN — Medication 1 DROP(S): at 06:31

## 2024-11-10 NOTE — PROGRESS NOTE ADULT - SUBJECTIVE AND OBJECTIVE BOX
Patient was seen and examined at bedside. Case discuss with the primary team. Pt had ab episode of desaturation this morning and pt was evaluated by the NSGY team. Pt had an ABG and CXR done at the time. In addition pt was tachycardic this morning with . Per nursing staff the pt continues to have some whitish secretions. Pt was febrile to 101.6 this afternoon.     OBJECTIVE:  Vital Signs Last 24 Hrs  T(C): 38.7 (10 Nov 2024 14:15), Max: 38.7 (10 Nov 2024 14:15)  T(F): 101.6 (10 Nov 2024 14:15), Max: 101.6 (10 Nov 2024 14:15)  HR: 113 (10 Nov 2024 13:54) (95 - 123)  BP: 141/90 (10 Nov 2024 13:54) (119/86 - 141/90)  BP(mean): 104 (09 Nov 2024 16:10) (104 - 104)  RR: 18 (10 Nov 2024 13:54) (16 - 19)  SpO2: 100% (10 Nov 2024 13:54) (85% - 100%)    Parameters below as of 10 Nov 2024 13:54  Patient On (Oxygen Delivery Method): tracheostomy collar    PHYSICAL EXAM:  Gen: resting comfortably, NAD  HEENT: NCAT, MMM  Neck: trach with minimal secretions   CV: RRR, no m/r/g, peripheral pulses 2+  Pulm: CTAB, no increased work of breathing, no rales/rhonchi  Abd: soft, ND, NT, PEG  Neuro: Pt does not follow commands    LABS:                        12.5   8.19  )-----------( 211      ( 10 Nov 2024 05:30 )             38.7       140  |  108  |  70[H]  ----------------------------<  129[H]  see note   |  17[L]  |  2.47[H]    Ca    9.6      10 Nov 2024 05:30  Phos  3.3     11-10  Mg     2.6     11-10      11/10 CXR:  Limited inspiration. No acute infiltrates  11/10 EKG  Sinus tach @105bpm       acetaminophen   Oral Liquid .. 650 milliGRAM(s) Oral every 6 hours PRN  acetylcysteine 10%  Inhalation 4 milliLiter(s) Inhalation every 6 hours  albuterol/ipratropium for Nebulization 3 milliLiter(s) Nebulizer every 6 hours  amLODIPine   Tablet 10 milliGRAM(s) Oral daily  artificial tears (preservative free) Ophthalmic Solution 1 Drop(s) Right EYE every 4 hours  calcium acetate 667 milliGRAM(s) Oral three times a day with meals  cefepime   IVPB 2000 milliGRAM(s) IV Intermittent every 12 hours  doxazosin 8 milliGRAM(s) Oral at bedtime  erythromycin   Ointment 1 Application(s) Right EYE every 6 hours  heparin   Injectable 5000 Unit(s) SubCutaneous every 8 hours  ofloxacin 0.3% Solution 1 Drop(s) Right EYE every 6 hours  petrolatum Ophthalmic Ointment 1 Application(s) Both EYES two times a day  polyethylene glycol 3350 17 Gram(s) Oral two times a day  sodium zirconium cyclosilicate 5 Gram(s) Oral daily

## 2024-11-10 NOTE — PROGRESS NOTE ADULT - SUBJECTIVE AND OBJECTIVE BOX
HPI:  Unknown age male, unknown past medical history (reported stroke and MI by coworkers) presented to [facility] with AMS, Pt was working at [employer] when he was found down by coworkers. EMS called and pt brought to [facility] ED. Intubated, sedated, started on cardene for SBPs in 200s. CT head showed brain stem bleed. Transferred to NSICU for further management.  (30 Sep 2024 12:55)    OVERNIGHT EVENTS: Seen and examined in [facility]. Denies HA, N/V, chest pain, shortness of breath, new weakness or numbness.     HOSPITAL COURSE:  9/30: transferred from [facility]. A line placed. Versed dc'd. Timate Prasanth at bedside, states pt has family in [place], cannot confirm medications or PMH other than stroke and MI. 250cc bolus 3% given. LR switched to NS. hydralazine 25q8 started, 3% started, switched propofol to precedex   10/1: stability CTH done. Added labetalol, started TF. Palliative consulted. ethics consulted to determine surrogate. febrile 103, pan cx sent  10/2: BD 2, GEORGE overnight. TF resumed. Desatt'd to 80s, FiO2 inc. to 50. Fentanyl given, ABG, CXR ordered. Maxxed on precedex, started on propofol for DARIEN -4 - -5. Precedex dc'd. Duonebs, mucomyst, hypertonic added. 3% dc'd. Cardene dc'd. Start vanc/CTX. Increased labetalol 200q8. MRSA negative, dc'd vanc. ETT pulled back 2cm x 2, good positioning after confirmatory chest xray. Ethics attempting to establish HCP with family. Na 159, starting FW 250q6 for range 150-155.   10/3: BD3, GEORGE o/n, neuro stable. Na elevating, FW increased to 300q6. Dc'd bowel reg for diarrhea. vEEG started. SQH 5000q8 tonight.   10/4: BD 4, albumn bolus, incr. LR to 80 2/2 incr. in Cr, LR to 100cc/hr for uptrending Cr. Started 7% hypersal for 48hrs and SL atropine for inline/oral thick secretions. Dc'd CTX and started ancef for MSSA in the sputum. Nephrology consulted for CKD, f/u recs. SBP 170s, given hydralazine 10mg IVP.   10/5: BD5, o/n 10mg IVP hydralazine given for SBP 170s and started on hydralazine 25q8 via OGT. 10mg IV push labetalol for SBP > 160s. RT placed for diarrhea.   10/6: BD6, o/n FW increased to 350q4 per nephrology recs. IV tylenol for temp 100.6, SBp 160s presumed uncomfortable.   10/7: BD7, overnight pancultured for temp 101.8F.   10/8: BD8. GEORGE. Cr bumped. decreased LR to 75cc/hr. Adding simethicone ATC. incr hydralazine 50mgTID. Incr labetalol 300mgTID. Na 145, decreased FWF to 250q6. Start precedex. FENa consistent with intrinsic kidney injury. Pend repeat renal US. Retaining up to 1.3L, bladder scans q6, straight cath PRN  10/9: BD 9. GEORGE overnight. Neuro stable. abd xray for distention w non-specific gas pattern, OGT to LIWS for morning. duonebs/mucomyst to q8 for improving secretions. Changed tube feeds to Jevity 1.5 20cc/hr, low rate due to abdominal distention, nepro dense and more difficult to digest. Tolerating CPAP, confirmed by ABG.   10/10: BD 10. GEORGE overnight. Neuro stable. (+) gabriel for urinary retention on bladder scan. inc TF to goal rate of 40cc/hr. family leaning toward pursuing trach/PEG. 1/2 amp for FS 81.   10/11: BD 11. GEORGE overnight. Neuro stable. Trach/PEG consults placed.   10/12: BD 12. GEORGE overnight. Neuro stable. MRI brain complete.   10/13: BD 13. Increase flomax. Hold SQH after PM dose for trach tm. IVL.   10/14: BD 14. GEORGE overnight, remains on AC/VC. Gabriel placed for urinary retention. Dc'd free water.  S/p trach with pulm. NGT placed and CXR confirmed in good position.   10/15: BD 15, GEORGE ovn. resumed feeds. spiked 101, pan cx sent.   10/16: BD 16. GEORGE ovn. Lokelma 5mg for K+ 5.4. Started vanc q 24/zosyn for empiric PNA coverage, IVF to 100/hr. PEG held for fever.   10/17: BD 17,  ordered serum osm and urine osm for am. Started sinemet for neurostimulation. Increased cardura to 0.8. Started FW 100q4, dc'd IVF. MRSA negative, dc'd vanc. NGT replaced d/t coiling.   10/18: BD 18, GEORGE overnight, neuro stable. Amantadine added for neurostim. zosyn changed to unasyn for acinetobacter baumannii, failed TOV and required SC  10/19: BD 19, GEORGE ovn. cardura 2mg added for retention. labetalol decreased 200q8, hydralazine decreased 25q8. Gabriel replaced.   10/20: BD20, GEORGE overnight. NGT dislodged, replaced. PEG tomorrow w/ gen surg, FW increased to 150q4 and labetalol decreased to 100q8, lokelma given for hyperkalemia.   10/21: BD 21. POD0 PEG placement with Gen surg. decr labetolol to 50q8, incr. cardura to 0.4, started lokelma and phoslo, dc gabriel POD0 PEG placement with Gen surg.  10/22: BD 22. Plan to start TF today via PEG. dc labetalol, Following ophtho recs. Increased apnea settings - found to be in cheyne-moe respiration. CPAP 5/5.  10/23: BD 23. hydralazine d/c'd, trach collar trial today. Rectal tube placed at 6am.  10/24: BD24, o/n lokelma held due to diarrhea. Free water 100q6 resumed. dc'd tamsulosin, amantadine. Incr'd cardura to 8mg qhs. Dc'd FW. Switched jevity to nepro. gabriel placed for high urine output. Started SL atropine for oral secretions. Dc'd free water.  10/25: BD25, o/n decreased suctioning requirements to > q4hrs, GEORGE. Cr improving, cont phoslo, lokelma held at this time. Gabriel placed yest, cont. Tolerating trach collar. Given 500cc plasmalyte bolus for ANIKA. Dc'd sinemet.   10/26: BD26, o/n resumed lokelma 5mg daily and resumed 100cc free water q6hrs. Change in neuro status with new right pupillary dilation with anisocoria (right pupil 6mm fixed and left pupil 3mm briskly reactive). Given 23.4% NaCl bullet, taken for emergent CTH showing mostly resolved pontine hemorrhage, continued brainstem hypodensity likely edema d/t hemorrhage, no new hemorrhage or infarct, no herniation, mild increase in size of left lateral ventricle. Vitals remaine stable. Na goal > 140.   10/27: BD27, o/n GEORGE.Neuro stable. Pend stepdown with airway bed.   10/28: BD 28. GEORGE overnight. Neuro stable. Miralax ordered. Gabriel removed, pending TOV.  10/29: BD 29. GEORGE o/n. Given 2L NS over 8 hrs for increased BUN/Cr ratio. Gabriel placed for frequent straight cath.   10/30: BD 30.   10/31: BD 31. GEORGE overnight. Na 149, increased free water to 200q6. 1L NS for uptrending BUN.   11/1: BD 32. GEORGE overnight. Given 1L NS for dehydration. Na 146, increased FW to 250q6.   11/2: BD 33 GEORGE overnight, neuro stable, given 500cc bolus for net negative status and tachycardia   11/3: BD 34, GEORGE overnight, neuro stable. Patient remains tachycardic, EKG showing sinus tachycardia, given additional 500cc NS bolus. Febrile to 101.9F, pan cultured (without UA), CXR WNL, given tylenol.   11/4: BD 35, GEORGE overnight, neuro stable. Given 1L NS for tachycardia. sputum (+) for stenotropohomas maltophilia.   11/5: BD 36 GEORGE overnight, neuro stable. Vancomycin dc'd. Chest PT BID. ID consulted, cont zosyn.  11/6: BD 37. blood cx + klebsiella dc zosyn changed to cefepime, CTAP ordered, rpt blood cx sent.    11/7: BD 38. Pending CT A/P, given 250cc bolus and starting maintenance fluids overnight. Pending CT A/P after bolus   11/8: BD 39. CT CAP negative for infection.   11/9: BD 40. GEORGE overnight.  11/10: BD 41. GEORGE overnight.     Vital Signs Last 24 Hrs  T(C): 36.8 (09 Nov 2024 22:10), Max: 37.3 (09 Nov 2024 16:10)  T(F): 98.3 (09 Nov 2024 22:10), Max: 99.1 (09 Nov 2024 16:10)  HR: 99 (10 Nov 2024 00:49) (90 - 100)  BP: 119/86 (09 Nov 2024 22:10) (117/80 - 134/89)  BP(mean): 104 (09 Nov 2024 16:10) (102 - 104)  RR: 18 (10 Nov 2024 00:49) (15 - 20)  SpO2: 96% (10 Nov 2024 00:49) (93% - 98%)    Parameters below as of 10 Nov 2024 00:49  Patient On (Oxygen Delivery Method): tracheostomy collar  O2 Flow (L/min): 10  O2 Concentration (%): 35    I&O's Summary    08 Nov 2024 07:01  -  09 Nov 2024 07:00  --------------------------------------------------------  IN: 0 mL / OUT: 1450 mL / NET: -1450 mL    09 Nov 2024 07:01  -  10 Nov 2024 03:26  --------------------------------------------------------  IN: 775 mL / OUT: 850 mL / NET: -75 mL    PHYSICAL EXAM:  General: NAD, pt is sitting up in bed, on trach collar to 35% FiO2  HEENT: left pupil 4mm reactive, right pupil 3mm sluggish; EOMI vertically b/l  Cardiovascular: RRR, S1, S2  Respiratory: breathing non-labored on trach collar, chest rise symmetric  GI: abd soft, NTND   Neuro: A&Ox0, intermittently follows commands.  RUE trace wiggles fingers to command, RLE briskly w/d to noxious; LUE 0/5, LLE w/d to noxious.   Unable to assess sensation secondary to mental status   Extremities: distal pulses 2+ x4  Wound/incision: n/a  Drain: (+) trach (+) PEG (+) gabriel    TUBES/LINES:  [x] Gabriel  [] Wound Drains  [x] Others- PEG, trach      DIET:  [] NPO  [] Mechanical  [x] Tube feeds  LABS:                        12.1   6.22  )-----------( 205      ( 09 Nov 2024 05:30 )             38.3     11-09    140  |  106  |  68[H]  ----------------------------<  112[H]  4.6   |  21[L]  |  2.52[H]    Ca    9.7      09 Nov 2024 05:30  Phos  4.0     11-09  Mg     2.8     11-09        Urinalysis Basic - ( 09 Nov 2024 05:30 )    Color: x / Appearance: x / SG: x / pH: x  Gluc: 112 mg/dL / Ketone: x  / Bili: x / Urobili: x   Blood: x / Protein: x / Nitrite: x   Leuk Esterase: x / RBC: x / WBC x   Sq Epi: x / Non Sq Epi: x / Bacteria: x          CAPILLARY BLOOD GLUCOSE          Drug Levels: [] N/A    CSF Analysis: [] N/A      Allergies    Allergy Status Unknown    Intolerances      MEDICATIONS:  Antibiotics:  cefepime   IVPB 2000 milliGRAM(s) IV Intermittent every 12 hours    Neuro:  acetaminophen   Oral Liquid .. 650 milliGRAM(s) Oral every 6 hours PRN    Anticoagulation:  heparin   Injectable 5000 Unit(s) SubCutaneous every 8 hours    OTHER:  acetylcysteine 10%  Inhalation 4 milliLiter(s) Inhalation every 6 hours  albuterol/ipratropium for Nebulization 3 milliLiter(s) Nebulizer every 6 hours  amLODIPine   Tablet 10 milliGRAM(s) Oral daily  artificial tears (preservative free) Ophthalmic Solution 1 Drop(s) Right EYE every 4 hours  doxazosin 8 milliGRAM(s) Oral at bedtime  erythromycin   Ointment 1 Application(s) Right EYE every 6 hours  ofloxacin 0.3% Solution 1 Drop(s) Right EYE every 6 hours  petrolatum Ophthalmic Ointment 1 Application(s) Both EYES two times a day  polyethylene glycol 3350 17 Gram(s) Oral two times a day  sodium zirconium cyclosilicate 5 Gram(s) Oral daily    IVF:  calcium acetate 667 milliGRAM(s) Oral three times a day with meals    CULTURES:  Culture Results:   No growth at 48 Hours (11-06 @ 20:05)  Culture Results:   Moderate Stenotrophomonas maltophilia  Commensal iram consistent with body site (11-03 @ 16:05)    RADIOLOGY & ADDITIONAL TESTS:  n/a    ASSESSMENT:  43 y/o PMH ?stroke/MI present to [facility] after collapsing at work. Decorticate posturing, vomiting, intubated for airway protection. 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 Transferred to [facility] for further management. s/p trach 10/14. s/p peg 10/21.      AMS    Handoff    MEWS Score    Acute myocardial infarction    CVA (cerebral vascular accident)    Intracerebral hemorrhage of brain stem    Brainstem stroke    Brain stem stroke syndrome    Brain stem hemorrhage    Brain stem stroke syndrome    Hemorrhagic stroke    Brainstem stroke    Encephalopathy acute    Functional quadriplegia    Advanced care planning/counseling discussion    Encounter for palliative care    Pontine hemorrhage    Neurogenic dysphagia    Chronic respiratory failure    Acute kidney injury superimposed on CKD    Acute urinary retention    Hypertensive emergency    Sepsis, unspecified organism    Sepsis    Gram-negative bacteremia    Percutaneous tracheal puncture    Altered mental status examination    EGD, with PEG    AMS    [email or web address]        PLAN:  Neuro:  - neuro/vitals q8  - pain control: Tylenol prn  - CTH 9/30: enlarged pontine hemorrhage, CTH 10/3: read stable, CTH 10/25 anisocoric pupils (R sluggish 6mm compared to left 3mm briskly reactive); showing mostly resolved pontine hemorrhage, continued brainstem hypodensity likely edema d/t hemorrhage, no new hemorrhage or infarct, no herniation, mild increase in size of left lateral ventricle  - vEEG (10/3-4)- negative, (10/17-10/19) - negative  - stroke core measures, stroke neuro signed off  - MRI brain w/ w/o contrast 10/12: parenchymal hemorrhage, acute/subacute R cerebellar stroke      CV:  - -160  - HTN: amlodipine 10 mg qd  - echo (9/30) EF 75%    Resp:  - tolerating trach collar 35%  - duonebs/mucomyst q6h  - chest PT vest BID    GI:  - Nepro TF via PEG (placed 10/21 by gen surg)  - bowel regimen, last BM 11/7  - CTAP 11/8 negative for infection      Renal:  - gabriel placed 10/29 d/t urinary retention - replaced 11/7  - FW flushes 250cc q6hrs   - hyperK 10/20: lokelma 5mg qd  - hyperPhos: phoslo 667mg TID   - Urinary retenion: Cardura 8 mg   - CKD: trend BUN/Cr  - Na goal 135-145  - renal US 10/1: echogenicity c/w chronic med renal dz, repeat 10/8: inc renal echogeicity, c/f medical renal disease w increased hydro     Endo:  - A1c 5.4    Heme:  - DVT ppx: SCDs, SQH 5000u q8h     ID:  - pan cultured 11/3 (w/o UA) , (+) sputum for stenotrophomas maltophlia, blood cx (+) klebsiella, cefepime 2gq12 (11/6 -)  - ID following  - empiric tx: s/p zosyn (11/3-11/6) , s/p vanc  (11/3- 11/5)  - S/p Ancef (10/4-10/14) for PNA, and s/p Unasyn (10/18-10/23) +actinobacter baumanii    MISC:  - ophtho consult for keratitis, rec erythromycin ointment to rt eye q4hrs, ofloxacin ointment to rt eye QID, artificial tears to rt eye q2hrs, moisture chamber at bedtime    Dispo: regional, full code, pending placement and emergency medicaid     D/w Dr. D'Amico   Assessment:  Present when checked    []  GCS  E   V  M     Heart Failure: []Acute, [] acute on chronic , []chronic  Heart Failure:  [] Diastolic (HFpEF), [] Systolic (HFrEF), []Combined (HFpEF and HFrEF), [] RHF, [] Pulm HTN, [] Other    [] ANIKA, [] ATN, [] AIN, [] other  [] CKD1, [] CKD2, [] CKD 3, [] CKD 4, [] CKD 5, []ESRD    Encephalopathy: [] Metabolic, [] Hepatic, [] toxic, [] Neurological, [] Other    Abnormal Nurtitional Status: [] malnurtition (see nutrition note), [ ]underweight: BMI < 19, [] morbid obesity: BMI >40, [] Cachexia    [] Sepsis  [] hypovolemic shock,[] cardiogenic shock, [] hemorrhagic shock, [] neuogenic shock  [] Acute Respiratory Failure  []Cerebral edema, [] Brain compression/ herniation,   [] Functional quadriplegia  [] Acute blood loss anemia

## 2024-11-10 NOTE — PROVIDER CONTACT NOTE (CHANGE IN STATUS NOTIFICATION) - ASSESSMENT
Respiratory therapist MIKE Alejandra came in to do chest PT. Pulse Ox was recorded at 85. Oxygen was increased to 100%.   VS: Blood pressure - 122/70. HR - 111. Temp - 99, axillary. O2 - 85.

## 2024-11-10 NOTE — PROGRESS NOTE ADULT - ASSESSMENT
44 YOF with unclear PMH (?stroke, MI) who was found down at work, intubated for airway protection and found to have acute parenchymal hemorrhage within nabil with mass effect (+ acute/subacute right cerebellar infarct) in setting of hypertensive emergency, transferred to [facility] for further neurosurgical care. Hospital course c/b poor neurologic recovery s/p trach-PEG, AUR s/p gabriel, ANIKA on CKD c/b hyperkalemia, HAP s/p amp-sulbactam (EOT 10/23), K aerogenes bacteremia.       #Gram-negative bacteremia.   -Pt with fever to 101.6 this afternoon; Will pan cx   -Pt's sputum cx ws positive for S maltophilia thought colonizer per discussion with ID.  -Pt's blood cx on 11/3 positive K aerogenes  - Source of bacteremia suspected  however there is no urine cx data given chronic gabriel (s/p exchanged 11/7)  - CTAP w/o evidence of intra-abdominal infection, surveillance blood cx sent on 11/6 was NGTD   -ID following and per ID recommendation will continue cefepime 2g q12 (11/6-11/12).    # Pontine hemorrhage.   -The pt was found to have acute parenchymal hemorrhage within nabil with mass effect (+ acute/subacute right cerebellar infarct) in setting of hypertensive emergency.   -The pt had an MRI brain also demonstrated acute/subacute R cerebellar stroke.   - Will continue management per NSGY  -Will continue pain management and bowel regimen as per NSGY team     # Hypertension  -Will continue  amlodipine 10mg daily     #Acute kidney injury superimposed on CKD.   -Pt s/p US renal with chronic medical renal disease. Hospital course c/b ATN.     -Will continue to renally dose medications and avoid nephrotoxins  - Will continue to monitor creatinine downtrending  Creatinine 2.52->2.47     #Hyperkalemia   - Pt had hyperkalemia now on lokelma 5g qd with improvement; Pt's K was hemolyzed on today's labs will repeat.     #Hyperphosphatemia   -Pt  had hyperphosphatemia now on phoslo 776 mg TID now within normal limits will continue to monitor.     # Chronic respiratory failure.   -Pt s/p percutaneous trach by pulm on 10/14/24  - Will continue  trach collar   - Will continue routine trach care and suctioning PRN  - Will continue duo-neb with mucomyst q6hr standing     # Neurogenic dysphagia.   -Pt s/pPEG with surgery 10/21/24  - TF per nutrition  - aspiration precautions and elevation of HOB.    #Acute urinary retention.   -Pt s/p gabriel placement  10/24 for urinary retention, failed TOV 10/27.   - doxazosin 8mg.    # Functional quadriplegia in  setting of brainstem hemorrhage  - decub precautions.    #DVT prop  -Continue Heparin SQ q8     #DISPO  -TBD - pending PRUCOL application    55 minutes spent on total encounter. The necessity of the time spent during the encounter on this date of service was due to:    Review of hospital course, labs, vitals, medical records.  Bedside exam   Discussed plan of care with primary team   Documenting the encounter. 44 YOF with unclear PMH (?stroke, MI) who was found down at work, intubated for airway protection and found to have acute parenchymal hemorrhage within nabil with mass effect (+ acute/subacute right cerebellar infarct) in setting of hypertensive emergency, transferred to [facility] for further neurosurgical care. Hospital course c/b poor neurologic recovery s/p trach-PEG, AUR s/p gabriel, ANIKA on CKD c/b hyperkalemia, HAP s/p amp-sulbactam (EOT 10/23), K aerogenes bacteremia.       #Gram-negative bacteremia.   -Pt was febrile to 101.6 this afternoon; Will pan cx   -Pt's blood cx on 11/3 positive K aerogenes  - Source of bacteremia suspected  however there is no urine cx data given chronic gabriel (s/p exchanged 11/7)  -Pt's surveillance blood cx sent on 11/6 was NGTD   -Spoke to ID  who recommended continuing  cefepime 2g q12 for now pending blood cx and urine cx   -Will check lactate     # Pontine hemorrhage.   -The pt was found to have acute parenchymal hemorrhage within nabil with mass effect (+ acute/subacute right cerebellar infarct) in setting of hypertensive emergency.   -The pt had an MRI brain also demonstrated acute/subacute R cerebellar stroke.   - Will continue management per NSGY  -Will continue pain management and bowel regimen as per NSGY team     # Hypertension  -Will continue amlodipine 10mg daily     #Acute kidney injury superimposed on CKD.   -Pt s/p US renal with chronic medical renal disease. Hospital course c/b ATN.   -Will continue to renally dose medications and avoid nephrotoxins  - Will continue to monitor creatinine downtrending  Creatinine 2.52->2.47     #Hyperkalemia   - Pt had hyperkalemia now on lokelma 5g qd with improvement; Pt's K was hemolyzed on today's labs will repeat.     #Hyperphosphatemia   -Pt  had hyperphosphatemia now on phoslo 776 mg TID now within normal limits will continue to monitor.     # Chronic respiratory failure.   -Pt s/p percutaneous trach by pulm on 10/14/24  - Will continue  trach collar   - Will continue routine trach care and suctioning PRN  - Will continue duo-neb with mucomyst q6hr standing     # Neurogenic dysphagia.   -Pt s/pPEG with surgery 10/21/24  - TF per nutrition  - aspiration precautions and elevation of HOB.    #Acute urinary retention.   -Pt s/p gabriel placement  10/24 for urinary retention, failed TOV 10/27.   - doxazosin 8mg.    # Functional quadriplegia in  setting of brainstem hemorrhage  - decub precautions.    #DVT prop  -Continue Heparin SQ q8     #DISPO  -TBD - pending PRUCOL application    55 minutes spent on total encounter. The necessity of the time spent during the encounter on this date of service was due to:    Review of hospital course, labs, vitals, medical records.  Bedside exam   Discussed plan of care with primary team   Documenting the encounter. 44 YOF with unclear PMH (?stroke, MI) who was found down at work, intubated for airway protection and found to have acute parenchymal hemorrhage within nabil with mass effect (+ acute/subacute right cerebellar infarct) in setting of hypertensive emergency, transferred to [facility] for further neurosurgical care. Hospital course c/b poor neurologic recovery s/p trach-PEG, AUR s/p gabriel, ANIKA on CKD c/b hyperkalemia, HAP s/p amp-sulbactam (EOT 10/23), K aerogenes bacteremia.       #Gram-negative bacteremia.   #Fever   -Pt was febrile to 101.6 this afternoon; Will pan cx   -Pt's blood cx on 11/3 positive K aerogenes  - Source of bacteremia suspected  however there is no urine cx data given chronic gabriel (s/p exchanged 11/7)  -Pt's surveillance blood cx sent on 11/6 was NGTD   -Spoke to ID  who recommended continuing  cefepime 2g q12 for now pending blood cx and urine cx   -Will check lactate     # Pontine hemorrhage.   -The pt was found to have acute parenchymal hemorrhage within anbil with mass effect (+ acute/subacute right cerebellar infarct) in setting of hypertensive emergency.   -The pt had an MRI brain also demonstrated acute/subacute R cerebellar stroke.   - Will continue management per NSGY  -Will continue pain management and bowel regimen as per NSGY team     # Hypertension  -Will continue amlodipine 10mg daily     #Acute kidney injury superimposed on CKD.   -Pt s/p US renal with chronic medical renal disease. Hospital course c/b ATN.   -Will continue to renally dose medications and avoid nephrotoxins  - Will continue to monitor creatinine downtrending  Creatinine 2.52->2.47     #Hyperkalemia   - Pt had hyperkalemia now on lokelma 5g qd with improvement; Pt's K was hemolyzed on today's labs will repeat.     #Hyperphosphatemia   -Pt  had hyperphosphatemia now on phoslo 776 mg TID now within normal limits will continue to monitor.     # Chronic respiratory failure.   -Pt s/p percutaneous trach by pulm on 10/14/24  - Will continue  trach collar   - Will continue routine trach care and suctioning PRN  - Will continue duo-neb with mucomyst q6hr standing     # Neurogenic dysphagia.   -Pt s/pPEG with surgery 10/21/24  - TF per nutrition  - aspiration precautions and elevation of HOB.    #Acute urinary retention.   -Pt s/p gabriel placement  10/24 for urinary retention, failed TOV 10/27.   - doxazosin 8mg.    # Functional quadriplegia in  setting of brainstem hemorrhage  - decub precautions.    #DVT prop  -Continue Heparin SQ q8     #DISPO  -TBD - pending PRUCOL application    55 minutes spent on total encounter. The necessity of the time spent during the encounter on this date of service was due to:    Review of hospital course, labs, vitals, medical records.  Bedside exam   Discussed plan of care with primary team   Documenting the encounter.

## 2024-11-11 LAB
ANION GAP SERPL CALC-SCNC: 11 MMOL/L — SIGNIFICANT CHANGE UP (ref 5–17)
BUN SERPL-MCNC: 76 MG/DL — HIGH (ref 7–23)
CALCIUM SERPL-MCNC: 9.1 MG/DL — SIGNIFICANT CHANGE UP (ref 8.4–10.5)
CHLORIDE SERPL-SCNC: 108 MMOL/L — SIGNIFICANT CHANGE UP (ref 96–108)
CO2 SERPL-SCNC: 20 MMOL/L — LOW (ref 22–31)
CREAT SERPL-MCNC: 2.69 MG/DL — HIGH (ref 0.5–1.3)
D DIMER BLD IA.RAPID-MCNC: 659 NG/ML DDU — HIGH
EGFR: 29 ML/MIN/1.73M2 — LOW
EGFR: 29 ML/MIN/1.73M2 — LOW
GLUCOSE SERPL-MCNC: 136 MG/DL — HIGH (ref 70–99)
HCT VFR BLD CALC: 37.3 % — LOW (ref 39–50)
HGB BLD-MCNC: 11.7 G/DL — LOW (ref 13–17)
MAGNESIUM SERPL-MCNC: 2.5 MG/DL — SIGNIFICANT CHANGE UP (ref 1.6–2.6)
MCHC RBC-ENTMCNC: 29.4 PG — SIGNIFICANT CHANGE UP (ref 27–34)
MCHC RBC-ENTMCNC: 31.4 G/DL — LOW (ref 32–36)
MCV RBC AUTO: 93.7 FL — SIGNIFICANT CHANGE UP (ref 80–100)
NRBC # BLD: 0 /100 WBCS — SIGNIFICANT CHANGE UP (ref 0–0)
NRBC BLD-RTO: 0 /100 WBCS — SIGNIFICANT CHANGE UP (ref 0–0)
PHOSPHATE SERPL-MCNC: 3.6 MG/DL — SIGNIFICANT CHANGE UP (ref 2.5–4.5)
PLATELET # BLD AUTO: 216 K/UL — SIGNIFICANT CHANGE UP (ref 150–400)
POTASSIUM SERPL-MCNC: 4.7 MMOL/L — SIGNIFICANT CHANGE UP (ref 3.5–5.3)
POTASSIUM SERPL-SCNC: 4.7 MMOL/L — SIGNIFICANT CHANGE UP (ref 3.5–5.3)
RBC # BLD: 3.98 M/UL — LOW (ref 4.2–5.8)
RBC # FLD: 14.1 % — SIGNIFICANT CHANGE UP (ref 10.3–14.5)
SODIUM SERPL-SCNC: 139 MMOL/L — SIGNIFICANT CHANGE UP (ref 135–145)
WBC # BLD: 9.39 K/UL — SIGNIFICANT CHANGE UP (ref 3.8–10.5)
WBC # FLD AUTO: 9.39 K/UL — SIGNIFICANT CHANGE UP (ref 3.8–10.5)

## 2024-11-11 PROCEDURE — 99232 SBSQ HOSP IP/OBS MODERATE 35: CPT

## 2024-11-11 PROCEDURE — 71045 X-RAY EXAM CHEST 1 VIEW: CPT | Mod: 26

## 2024-11-11 PROCEDURE — 93970 EXTREMITY STUDY: CPT | Mod: 26

## 2024-11-11 PROCEDURE — 93010 ELECTROCARDIOGRAM REPORT: CPT

## 2024-11-11 PROCEDURE — 99233 SBSQ HOSP IP/OBS HIGH 50: CPT

## 2024-11-11 RX ORDER — ACETAMINOPHEN 500 MG/5ML
1000 LIQUID (ML) ORAL ONCE
Refills: 0 | Status: COMPLETED | OUTPATIENT
Start: 2024-11-11 | End: 2024-11-11

## 2024-11-11 RX ADMIN — ERYTHROMYCIN 1 APPLICATION(S): 5 OINTMENT OPHTHALMIC at 18:01

## 2024-11-11 RX ADMIN — Medication 667 MILLIGRAM(S): at 18:01

## 2024-11-11 RX ADMIN — Medication 1 APPLICATION(S): at 18:02

## 2024-11-11 RX ADMIN — Medication 500 MILLILITER(S): at 03:11

## 2024-11-11 RX ADMIN — CEFEPIME 100 MILLIGRAM(S): 2 INJECTION, POWDER, FOR SOLUTION INTRAVENOUS at 05:45

## 2024-11-11 RX ADMIN — OFLOXACIN 1 DROP(S): 3 SOLUTION OPHTHALMIC at 05:45

## 2024-11-11 RX ADMIN — ERYTHROMYCIN 1 APPLICATION(S): 5 OINTMENT OPHTHALMIC at 00:21

## 2024-11-11 RX ADMIN — IPRATROPIUM BROMIDE AND ALBUTEROL SULFATE 3 MILLILITER(S): .5; 2.5 SOLUTION RESPIRATORY (INHALATION) at 00:20

## 2024-11-11 RX ADMIN — Medication 650 MILLIGRAM(S): at 13:06

## 2024-11-11 RX ADMIN — IPRATROPIUM BROMIDE AND ALBUTEROL SULFATE 3 MILLILITER(S): .5; 2.5 SOLUTION RESPIRATORY (INHALATION) at 05:44

## 2024-11-11 RX ADMIN — OFLOXACIN 1 DROP(S): 3 SOLUTION OPHTHALMIC at 00:20

## 2024-11-11 RX ADMIN — Medication 1 DROP(S): at 09:27

## 2024-11-11 RX ADMIN — Medication 1 DROP(S): at 00:21

## 2024-11-11 RX ADMIN — ACETYLCYSTEINE 4 MILLILITER(S): 200 INHALANT RESPIRATORY (INHALATION) at 18:00

## 2024-11-11 RX ADMIN — Medication 667 MILLIGRAM(S): at 09:27

## 2024-11-11 RX ADMIN — DOXAZOSIN MESYLATE 8 MILLIGRAM(S): 8 TABLET ORAL at 22:37

## 2024-11-11 RX ADMIN — ACETYLCYSTEINE 4 MILLILITER(S): 200 INHALANT RESPIRATORY (INHALATION) at 00:20

## 2024-11-11 RX ADMIN — Medication 1 DROP(S): at 18:01

## 2024-11-11 RX ADMIN — AMLODIPINE BESYLATE 10 MILLIGRAM(S): 10 TABLET ORAL at 05:45

## 2024-11-11 RX ADMIN — IPRATROPIUM BROMIDE AND ALBUTEROL SULFATE 3 MILLILITER(S): .5; 2.5 SOLUTION RESPIRATORY (INHALATION) at 18:00

## 2024-11-11 RX ADMIN — CEFEPIME 100 MILLIGRAM(S): 2 INJECTION, POWDER, FOR SOLUTION INTRAVENOUS at 18:00

## 2024-11-11 RX ADMIN — OFLOXACIN 1 DROP(S): 3 SOLUTION OPHTHALMIC at 13:03

## 2024-11-11 RX ADMIN — IPRATROPIUM BROMIDE AND ALBUTEROL SULFATE 3 MILLILITER(S): .5; 2.5 SOLUTION RESPIRATORY (INHALATION) at 13:03

## 2024-11-11 RX ADMIN — POLYETHYLENE GLYCOL 3350 17 GRAM(S): 17 POWDER, FOR SOLUTION ORAL at 18:00

## 2024-11-11 RX ADMIN — Medication 1000 MILLIGRAM(S): at 03:58

## 2024-11-11 RX ADMIN — Medication 1 DROP(S): at 22:37

## 2024-11-11 RX ADMIN — Medication 1 APPLICATION(S): at 05:45

## 2024-11-11 RX ADMIN — HEPARIN SODIUM 5000 UNIT(S): 1000 INJECTION INTRAVENOUS; SUBCUTANEOUS at 22:37

## 2024-11-11 RX ADMIN — Medication 650 MILLIGRAM(S): at 14:06

## 2024-11-11 RX ADMIN — Medication 400 MILLIGRAM(S): at 02:58

## 2024-11-11 RX ADMIN — HEPARIN SODIUM 5000 UNIT(S): 1000 INJECTION INTRAVENOUS; SUBCUTANEOUS at 13:03

## 2024-11-11 RX ADMIN — Medication 1 DROP(S): at 13:03

## 2024-11-11 RX ADMIN — OFLOXACIN 1 DROP(S): 3 SOLUTION OPHTHALMIC at 18:04

## 2024-11-11 RX ADMIN — SODIUM ZIRCONIUM CYCLOSILICATE 5 GRAM(S): 5 POWDER, FOR SUSPENSION ORAL at 13:11

## 2024-11-11 RX ADMIN — POLYETHYLENE GLYCOL 3350 17 GRAM(S): 17 POWDER, FOR SOLUTION ORAL at 05:45

## 2024-11-11 RX ADMIN — ACETYLCYSTEINE 4 MILLILITER(S): 200 INHALANT RESPIRATORY (INHALATION) at 05:44

## 2024-11-11 RX ADMIN — ERYTHROMYCIN 1 APPLICATION(S): 5 OINTMENT OPHTHALMIC at 05:45

## 2024-11-11 RX ADMIN — ERYTHROMYCIN 1 APPLICATION(S): 5 OINTMENT OPHTHALMIC at 13:02

## 2024-11-11 RX ADMIN — Medication 1 DROP(S): at 05:44

## 2024-11-11 RX ADMIN — Medication 667 MILLIGRAM(S): at 13:02

## 2024-11-11 RX ADMIN — HEPARIN SODIUM 5000 UNIT(S): 1000 INJECTION INTRAVENOUS; SUBCUTANEOUS at 05:44

## 2024-11-11 RX ADMIN — ACETYLCYSTEINE 4 MILLILITER(S): 200 INHALANT RESPIRATORY (INHALATION) at 13:11

## 2024-11-11 NOTE — CHART NOTE - NSCHARTNOTEFT_GEN_A_CORE
Admitting Diagnosis:   Patient is a 44y old  Male who presents with a chief complaint of brain stem bleed (11 Nov 2024 01:52)    PAST MEDICAL & SURGICAL HISTORY:  Acute myocardial infarction      CVA (cerebral vascular accident)        Current Nutrition Order:  Diet, NPO with Tube Feed:   Tube Feeding Modality: Gastrostomy  Nepro with Carb Steady (NEPRORTH)  Total Volume for 24 Hours (mL): 1080  Total Number of Cans: 5  Continuous  Starting Tube Feed Rate {mL per Hour}: 45  Increase Tube Feed Rate by (mL): 10     Every 4 hours  Until Goal Tube Feed Rate (mL per Hour): 60  Tube Feed Duration (in Hours): 18  Tube Feed Start Time: 10:00  Tube Feed Stop Time: 04:00  Banatrol TF     Qty per Day:  2 (10-28-24 @ 16:11) [Active]    PO Intake: N/A... NPO with tube feeds     GI Issues: last bowel movement documented this AM - liquid; spoke with RN this morning who reported pt continues with loose bowel movements but is unsure if he has been receiving the Banatrol, endorses she was going to provide it BID today.     Pain: Absence of non-verbal indicators     Skin Integrity: WDL, PEG present         11-10-24 @ 07:01  -  11-11-24 @ 07:00  --------------------------------------------------------  IN: 2130 mL / OUT: 1575 mL / NET: 555 mL    11-11-24 @ 07:01  -  11-11-24 @ 12:47  --------------------------------------------------------  IN: 0 mL / OUT: 0 mL / NET: 0 mL        Labs:   11-11    139  |  108  |  76[H]  ----------------------------<  136[H]  4.7   |  20[L]  |  2.69[H]    Ca    9.1      11 Nov 2024 07:24  Phos  3.6     11-11  Mg     2.5     11-11    Medications:  MEDICATIONS  (STANDING):  acetylcysteine 10%  Inhalation 4 milliLiter(s) Inhalation every 6 hours  albuterol/ipratropium for Nebulization 3 milliLiter(s) Nebulizer every 6 hours  amLODIPine   Tablet 10 milliGRAM(s) Oral daily  artificial tears (preservative free) Ophthalmic Solution 1 Drop(s) Right EYE every 4 hours  calcium acetate 667 milliGRAM(s) Oral three times a day with meals  cefepime   IVPB 2000 milliGRAM(s) IV Intermittent every 12 hours  doxazosin 8 milliGRAM(s) Oral at bedtime  erythromycin   Ointment 1 Application(s) Right EYE every 6 hours  heparin   Injectable 5000 Unit(s) SubCutaneous every 8 hours  ofloxacin 0.3% Solution 1 Drop(s) Right EYE every 6 hours  petrolatum Ophthalmic Ointment 1 Application(s) Both EYES two times a day  polyethylene glycol 3350 17 Gram(s) Oral two times a day  sodium zirconium cyclosilicate 5 Gram(s) Oral daily    MEDICATIONS  (PRN):  acetaminophen   Oral Liquid .. 650 milliGRAM(s) Oral every 6 hours PRN Temp greater or equal to 38C (100.4F), Mild Pain (1 - 3)      Dosing Anthropometrics:   Height for BMI (FEET)	5 Feet  Height for BMI (INCHES)	8 Inch(s)  Height for BMI (CM)	172.72 Centimeter(s)  Weight for BMI (lbs)	176.4 lb  Weight for BMI (kg)	80 kg  Body Mass Index	              26.8  ideal body weight:               154 pounds / 70 kg   %ideal body weight             114%     Weight Change: No new wt obtained since admit, strongly recommend nursing to obtain new wt to track/ trend changes     Estimated energy needs:  Estimated Energy Needs Weight (lbs)	176 lb  Estimated Energy Needs Weight (kg)	80 kg  Estimated Energy Needs From (christiana/kg) 22  Estimated Energy Needs To (christiana/kg)	27  Estimated Energy Needs Calculated From (christiana/kg) 1760  Estimated Energy Needs Calculated To (christiana/kg)	2160    Estimated Protein Needs Weight (lbs)	176 lb  Estimated Protein Needs Weight (kg)	80 kg  Estimated Protein Needs From (g/kg)	1.0  Estimated Protein Needs To (g/kg)	1.4  Estimated Protein Needs Calculated From (g/kg) 80   Estimated Protein Needs Calculated To (g/kg)	112    Estimated Fluid Needs Weight (lbs)	176 lb  Estimated Fluid Needs Weight (kg)	80 kg  Estimated Fluid Needs From (ml/kg)	20  Estimated Fluid Needs To (ml/kg)	25  Estimated Fluid Needs Calculated From (ml/kg)	1600  Estimated Fluid Needs Calculated To (ml/kg)	2000    Other Calculations: Based on actual body weight as ideal body weigh between %, no longer in ICU setting. Needs adjusted for age, clinical course, vented.     Subjective: This is a 44 year old male, unknown past medical history (reported stroke and MI by coworkers) presented to [facility] with AMS, Pt was working at [employer] when he was found down by coworkers. EMS called and pt brought to [facility] ED. Intubated, sedated, started on cardene for SBPs in 200s. CT head showed brain stem bleed. Transferred to NSICU for further management.    Patient seen at bedside on 9UR for nutrition follow up. Current diet order: NPO with tube feeds. Upon RD visit this morning, tube feeds off as feeds only run 10am-4am. Spoke with RN at bedside who denied pt with any signs/ symptoms of tube feed intolerance and was receiving order of Nepro at goal rate of 60 cc/hr x18 hrs overnight - meeting 100% estimated nutrient needs. However, RN also reports pt still with persistent loose stools/ diarrhea and was unsure if he was previously receiving the Banatrol as per tube feed orders - RN endorsed she will provide Banatrol BID today and continue to monitor bowel movements. If diarrhea persists, can increase Banatrol to 4 packets/ day with a MAXIMUM of 6 packets/ day. Labs and medications reviewed. RDN will continue to follow, see nutrition recommendations below.     Previous Nutrition Diagnosis: Inadequate Oral Intake related to inability to meet nutritional demands via PO as evidenced by NPO with tube feedings    Active [  x ]  Resolved [   ]    Goal: Pt will consume >/= 75% of all meals and supplements via tolerated route     Recommendations:  1. NPO with tube feedings, continue with current tube feed regimen to continue to meet 100% estimated needs:   **Nepro 1.8 via PEG at goal rate of 60mL/hour x 18 hours, to provide 1080mL total volume from tube feeding, 1912 calories, 87g protein, 785mL free water; this meets ~24 calories/kg, 1.1g protein/kg; consider 190mL free water flush every 4 hours  2. Continue to administer Banatrol BID as diarrhea persists, if diarrhea continues to persist while receiving Banatrol can increase to 4 packets/ day or a MAXIMUM of 6 packets/day.   3. Maintain aspiration precautions at all times; monitor for signs/symptoms of intolerance*  4. Monitor weights (weekly), GI function, skin integrity; electrolytes, BMP, glucose, CBC per MD discretion **renal indices**  5. Pain and bowel regimen per medical team  6. Align nutrition with goals of care at all times  7. Recommend nursing to obtain new wt to track/ trend changes     Risk Level: High [ x  ] Moderate [   ] Low [   ]

## 2024-11-11 NOTE — CHART NOTE - NSCHARTNOTEFT_GEN_A_CORE
PA called to bedside for patient tachycardic to 130s, O2 sat 91% on 100% FiO2 and 10L to trach collar.     Patient was febrile 101.4F. Patient was suctioned both in-line and oral, and O2 sats inc. Pt also given 1g IV tylenol and 500cc bolus NS for fever and tachycardia. PA will continue to monitor.

## 2024-11-11 NOTE — PROGRESS NOTE ADULT - ASSESSMENT
44 YOF with unclear PMH (?stroke, MI) who was found down at work, intubated for airway protection and found to have acute parenchymal hemorrhage within nabil with mass effect (+ acute/subacute right cerebellar infarct) in setting of hypertensive emergency, transferred to [facility] for further neurosurgical care. Hospital course c/b poor neurologic recovery s/p trach-PEG, AUR s/p gabriel, ANIKA on CKD c/b hyperkalemia, HAP s/p amp-sulbactam (EOT 10/23), K aerogenes bacteremia.       #Gram-negative bacteremia.   #Fever ? LLL pneumonia   -Pt's blood cx on 11/3 positive K aerogenes  - Source of bacteremia suspected  however there is no urine cx data given chronic gabriel (s/p exchanged 11/7)  -Pt's surveillance blood cx sent on 11/6 was NGTD   -Spoke to ID  who recommended continuing  cefepime 2g q12 for now pending blood cx and urine cx   -F/u Sputum Cx     # Pontine hemorrhage.   -The pt was found to have acute parenchymal hemorrhage within nabil with mass effect (+ acute/subacute right cerebellar infarct) in setting of hypertensive emergency.   -The pt had an MRI brain also demonstrated acute/subacute R cerebellar stroke.   - Will continue management per NSGY  -Will continue pain management and bowel regimen as per NSGY team     # Hypertension  -Will continue amlodipine 10mg daily     #Acute kidney injury superimposed on CKD.   -Pt s/p US renal with chronic medical renal disease. Hospital course c/b ATN.   -Will continue to renally dose medications and avoid nephrotoxins  - Will continue to monitor creatinine downtrending  Creatinine 2.52->2.47     #Hyperkalemia   - Pt had hyperkalemia now on lokelma 5g qd with improvement; Pt's K was hemolyzed on today's labs will repeat.     #Hyperphosphatemia   -Pt  had hyperphosphatemia now on phoslo 776 mg TID now within normal limits will continue to monitor.     # Chronic respiratory failure.   -Pt s/p percutaneous trach by pulm on 10/14/24  - Will continue  trach collar   - Will continue routine trach care and suctioning PRN  - Will continue duo-neb with mucomyst q6hr standing     # Neurogenic dysphagia.   -Pt s/pPEG with surgery 10/21/24  - TF per nutrition  - aspiration precautions and elevation of HOB.    #Acute urinary retention.   -Pt s/p gabriel placement  10/24 for urinary retention, failed TOV 10/27.   - doxazosin 8mg.    # Functional quadriplegia in  setting of brainstem hemorrhage  - decub precautions.    #DVT prop  -Continue Heparin SQ q8     #DISPO  -TBD - pending PRUCOL application    55 minutes spent on total encounter. The necessity of the time spent during the encounter on this date of service was due to:    Review of hospital course, labs, vitals, medical records.  Bedside exam   Discussed plan of care with primary team   Documenting the encounter.

## 2024-11-11 NOTE — PROGRESS NOTE ADULT - SUBJECTIVE AND OBJECTIVE BOX
Patient was seen and examined at bedside. Case discuss with the primary team.     Pt had an episode of desaturation this morning and pt was evaluated by the NSGY team.    Per nursing staff the pt continues to have some whitish secretions.      OBJECTIVE:  Vital Signs Last 24 Hrs  T(C): 36.7 (11 Nov 2024 20:40), Max: 38.6 (11 Nov 2024 02:45)  T(F): 98.1 (11 Nov 2024 20:40), Max: 101.4 (11 Nov 2024 02:45)  HR: 88 (11 Nov 2024 20:40) (88 - 991)  BP: 134/90 (11 Nov 2024 20:40) (119/80 - 135/92)  BP(mean): --  RR: 17 (11 Nov 2024 20:40) (17 - 19)  SpO2: 97% (11 Nov 2024 20:40) (91% - 100%)    Parameters below as of 11 Nov 2024 20:40  Patient On (Oxygen Delivery Method): tracheostomy collar  O2 Flow (L/min): 10    PHYSICAL EXAM:  Gen: resting comfortably, NAD  HEENT: NCAT, MMM  Neck: trach with minimal secretions   CV: RRR, no m/r/g, peripheral pulses 2+  Pulm: CTAB, no increased work of breathing, no rales/rhonchi  Abd: soft, ND, NT, PEG  Neuro: Pt does not follow commands    LABS:                                   11.7   9.39  )-----------( 216      ( 11 Nov 2024 07:24 )             37.3     11-11    139  |  108  |  76[H]  ----------------------------<  136[H]  4.7   |  20[L]  |  2.69[H]    Ca    9.1      11 Nov 2024 07:24  Phos  3.6     11-11  Mg     2.5     11-11          11/10 CXR:  Limited inspiration.  11/11 CXR - Left lung base infiltrate   11/10 EKG  Sinus tach @105bpm       acetaminophen   Oral Liquid .. 650 milliGRAM(s) Oral every 6 hours PRN  acetylcysteine 10%  Inhalation 4 milliLiter(s) Inhalation every 6 hours  albuterol/ipratropium for Nebulization 3 milliLiter(s) Nebulizer every 6 hours  amLODIPine   Tablet 10 milliGRAM(s) Oral daily  artificial tears (preservative free) Ophthalmic Solution 1 Drop(s) Right EYE every 4 hours  calcium acetate 667 milliGRAM(s) Oral three times a day with meals  cefepime   IVPB 2000 milliGRAM(s) IV Intermittent every 12 hours  doxazosin 8 milliGRAM(s) Oral at bedtime  erythromycin   Ointment 1 Application(s) Right EYE every 6 hours  heparin   Injectable 5000 Unit(s) SubCutaneous every 8 hours  ofloxacin 0.3% Solution 1 Drop(s) Right EYE every 6 hours  petrolatum Ophthalmic Ointment 1 Application(s) Both EYES two times a day  polyethylene glycol 3350 17 Gram(s) Oral two times a day  sodium zirconium cyclosilicate 5 Gram(s) Oral daily

## 2024-11-11 NOTE — PROGRESS NOTE ADULT - SUBJECTIVE AND OBJECTIVE BOX
HPI:  Unknown age male, unknown past medical history (reported stroke and MI by coworkers) presented to [facility] with AMS, Pt was working at [employer] when he was found down by coworkers. EMS called and pt brought to [facility] ED. Intubated, sedated, started on cardene for SBPs in 200s. CT head showed brain stem bleed. Transferred to NSICU for further management.  (30 Sep 2024 12:55)    OVERNIGHT EVENTS: Seen and examined in [facility]. Unable to elicit any complaints at this time.     HOSPITAL COURSE:  9/30: transferred from [facility]. A line placed. Versed dc'd. Roomate Prasanth at bedside, states pt has family in [place], cannot confirm medications or PMH other than stroke and MI. 250cc bolus 3% given. LR switched to NS. hydralazine 25q8 started, 3% started, switched propofol to precedex   10/1: stability CTH done. Added labetalol, started TF. Palliative consulted. ethics consulted to determine surrogate. febrile 103, pan cx sent  10/2: BD 2, GEORGE overnight. TF resumed. Desatt'd to 80s, FiO2 inc. to 50. Fentanyl given, ABG, CXR ordered. Maxxed on precedex, started on propofol for DARIEN -4 - -5. Precedex dc'd. Duonebs, mucomyst, hypertonic added. 3% dc'd. Cardene dc'd. Start vanc/CTX. Increased labetalol 200q8. MRSA negative, dc'd vanc. ETT pulled back 2cm x 2, good positioning after confirmatory chest xray. Ethics attempting to establish HCP with family. Na 159, starting FW 250q6 for range 150-155.   10/3: BD3, GEORGE o/n, neuro stable. Na elevating, FW increased to 300q6. Dc'd bowel reg for diarrhea. vEEG started. SQH 5000q8 tonight.   10/4: BD 4, albumn bolus, incr. LR to 80 2/2 incr. in Cr, LR to 100cc/hr for uptrending Cr. Started 7% hypersal for 48hrs and SL atropine for inline/oral thick secretions. Dc'd CTX and started ancef for MSSA in the sputum. Nephrology consulted for CKD, f/u recs. SBP 170s, given hydralazine 10mg IVP.   10/5: BD5, o/n 10mg IVP hydralazine given for SBP 170s and started on hydralazine 25q8 via OGT. 10mg IV push labetalol for SBP > 160s. RT placed for diarrhea.   10/6: BD6, o/n FW increased to 350q4 per nephrology recs. IV tylenol for temp 100.6, SBp 160s presumed uncomfortable.   10/7: BD7, overnight pancultured for temp 101.8F.   10/8: BD8. GEORGE. Cr bumped. decreased LR to 75cc/hr. Adding simethicone ATC. incr hydralazine 50mgTID. Incr labetalol 300mgTID. Na 145, decreased FWF to 250q6. Start precedex. FENa consistent with intrinsic kidney injury. Pend repeat renal US. Retaining up to 1.3L, bladder scans q6, straight cath PRN  10/9: BD 9. GEORGE overnight. Neuro stable. abd xray for distention w non-specific gas pattern, OGT to LIWS for morning. duonebs/mucomyst to q8 for improving secretions. Changed tube feeds to Jevity 1.5 20cc/hr, low rate due to abdominal distention, nepro dense and more difficult to digest. Tolerating CPAP, confirmed by ABG.   10/10: BD 10. GEORGE overnight. Neuro stable. (+) gabriel for urinary retention on bladder scan. inc TF to goal rate of 40cc/hr. family leaning toward pursuing trach/PEG. 1/2 amp for FS 81.   10/11: BD 11. GEORGE overnight. Neuro stable. Trach/PEG consults placed.   10/12: BD 12. GEORGE overnight. Neuro stable. MRI brain complete.   10/13: BD 13. Increase flomax. Hold SQH after PM dose for trach tm. IVL.   10/14: BD 14. GEORGE overnight, remains on AC/VC. Gabriel placed for urinary retention. Dc'd free water.  S/p trach with pulm. NGT placed and CXR confirmed in good position.   10/15: BD 15, GEORGE ovn. resumed feeds. spiked 101, pan cx sent.   10/16: BD 16. GEORGE ovn. Lokelma 5mg for K+ 5.4. Started vanc q 24/zosyn for empiric PNA coverage, IVF to 100/hr. PEG held for fever.   10/17: BD 17,  ordered serum osm and urine osm for am. Started sinemet for neurostimulation. Increased cardura to 0.8. Started FW 100q4, dc'd IVF. MRSA negative, dc'd vanc. NGT replaced d/t coiling.   10/18: BD 18, GEORGE overnight, neuro stable. Amantadine added for neurostim. zosyn changed to unasyn for acinetobacter baumannii, failed TOV and required SC  10/19: BD 19, GEORGE ovn. cardura 2mg added for retention. labetalol decreased 200q8, hydralazine decreased 25q8. Gabriel replaced.   10/20: BD20, GEORGE overnight. NGT dislodged, replaced. PEG tomorrow w/ gen surg, FW increased to 150q4 and labetalol decreased to 100q8, lokelma given for hyperkalemia.   10/21: BD 21. POD0 PEG placement with Gen surg. decr labetolol to 50q8, incr. cardura to 0.4, started lokelma and phoslo, dc gabriel POD0 PEG placement with Gen surg.  10/22: BD 22. Plan to start TF today via PEG. dc labetalol, Following ophtho recs. Increased apnea settings - found to be in cheyne-moe respiration. CPAP 5/5.  10/23: BD 23. hydralazine d/c'd, trach collar trial today. Rectal tube placed at 6am.  10/24: BD24, o/n lokelma held due to diarrhea. Free water 100q6 resumed. dc'd tamsulosin, amantadine. Incr'd cardura to 8mg qhs. Dc'd FW. Switched jevity to nepro. gabriel placed for high urine output. Started SL atropine for oral secretions. Dc'd free water.  10/25: BD25, o/n decreased suctioning requirements to > q4hrs, GEORGE. Cr improving, cont phoslo, lokelma held at this time. Gabriel placed yest, cont. Tolerating trach collar. Given 500cc plasmalyte bolus for ANIKA. Dc'd sinemet.   10/26: BD26, o/n resumed lokelma 5mg daily and resumed 100cc free water q6hrs. Change in neuro status with new right pupillary dilation with anisocoria (right pupil 6mm fixed and left pupil 3mm briskly reactive). Given 23.4% NaCl bullet, taken for emergent CTH showing mostly resolved pontine hemorrhage, continued brainstem hypodensity likely edema d/t hemorrhage, no new hemorrhage or infarct, no herniation, mild increase in size of left lateral ventricle. Vitals remaine stable. Na goal > 140.   10/27: BD27, o/n GEORGE.Neuro stable. Pend stepdown with airway bed.   10/28: BD 28. GEORGE overnight. Neuro stable. Miralax ordered. Gabriel removed, pending TOV.  10/29: BD 29. GEORGE o/n. Given 2L NS over 8 hrs for increased BUN/Cr ratio. Gabriel placed for frequent straight cath.   10/30: BD 30.   10/31: BD 31. GEORGE overnight. Na 149, increased free water to 200q6. 1L NS for uptrending BUN.   11/1: BD 32. GEORGE overnight. Given 1L NS for dehydration. Na 146, increased FW to 250q6.   11/2: BD 33 GEORGE overnight, neuro stable, given 500cc bolus for net negative status and tachycardia   11/3: BD 34, GEORGE overnight, neuro stable. Patient remains tachycardic, EKG showing sinus tachycardia, given additional 500cc NS bolus. Febrile to 101.9F, pan cultured (without UA), CXR WNL, given tylenol.   11/4: BD 35, GEORGE overnight, neuro stable. Given 1L NS for tachycardia. sputum (+) for stenotropohomas maltophilia.   11/5: BD 36 GEORGE overnight, neuro stable. Vancomycin dc'd. Chest PT BID. ID consulted, cont zosyn.  11/6: BD 37. blood cx + klebsiella dc zosyn changed to cefepime, CTAP ordered, rpt blood cx sent.    11/7: BD 38. Pending CT A/P, given 250cc bolus and starting maintenance fluids overnight. Pending CT A/P after bolus   11/8: BD 39. CT CAP negative for infection.   11/9: BD 40. GEORGE overnight.  11/10: BD 41. GEORGE overnight. desat to 85 on trach collar, O2 inc to 10L and 100%, O2 sat inc to 95. pt tachy to 110s, euvolemic. given tylenol. ABG and CXR ordered. spiked fever, pancultured, RVP negative. AM ABG w pO2 79, rpt w pO2 79. pt appears comfortable, satting 94%.   11/11: BD 42. GEORGE overnight.     Vital Signs Last 24 Hrs  T(C): 37.1 (10 Nov 2024 20:20), Max: 38.7 (10 Nov 2024 14:15)  T(F): 98.8 (10 Nov 2024 20:20), Max: 101.6 (10 Nov 2024 14:15)  HR: 117 (10 Nov 2024 22:15) (100 - 123)  BP: 144/97 (10 Nov 2024 20:20) (122/70 - 144/97)  BP(mean): --  RR: 18 (10 Nov 2024 22:15) (17 - 19)  SpO2: 94% (10 Nov 2024 22:15) (85% - 100%)    Parameters below as of 10 Nov 2024 22:42  Patient On (Oxygen Delivery Method): tracheostomy collar        I&O's Summary    09 Nov 2024 07:01  -  10 Nov 2024 07:00  --------------------------------------------------------  IN: 775 mL / OUT: 850 mL / NET: -75 mL    10 Nov 2024 07:01  -  11 Nov 2024 01:53  --------------------------------------------------------  IN: 960 mL / OUT: 750 mL / NET: 210 mL        PHYSICAL EXAM:  General: NAD, pt is sitting up in bed, on trach collar to 35% FiO2  HEENT: left pupil 4mm reactive, right pupil 3mm briskly reactive; EOMI vertically b/l  Cardiovascular: RRR, S1, S2  Respiratory: breathing non-labored on trach collar, chest rise symmetric  GI: abd soft, NTND   Neuro: A&Ox0, intermittently follows commands.  RUE trace wiggles fingers to command, RLE briskly w/d to noxious; LUE 0/5, LLE w/d to noxious.   Unable to assess sensation secondary to mental status   Extremities: distal pulses 2+ x4  Wound/incision: n/a  Drain: (+) trach (+) PEG (+) gabriel    TUBES/LINES:  [x] Gabriel  [] Wound Drains  [x] Others- PEG, trach      DIET:  [] NPO  [] Mechanical  [x] Tube feeds    LABS:                        12.5   8.19  )-----------( 211      ( 10 Nov 2024 05:30 )             38.7     11-10    140  |  108  |  70[H]  ----------------------------<  129[H]  see note   |  17[L]  |  2.47[H]    Ca    9.6      10 Nov 2024 05:30  Phos  3.3     11-10  Mg     2.6     11-10        Urinalysis Basic - ( 10 Nov 2024 05:30 )    Color: x / Appearance: x / SG: x / pH: x  Gluc: 129 mg/dL / Ketone: x  / Bili: x / Urobili: x   Blood: x / Protein: x / Nitrite: x   Leuk Esterase: x / RBC: x / WBC x   Sq Epi: x / Non Sq Epi: x / Bacteria: x          CAPILLARY BLOOD GLUCOSE          Drug Levels: [] N/A    CSF Analysis: [] N/A      Allergies    Allergy Status Unknown    Intolerances      MEDICATIONS:  Antibiotics:  cefepime   IVPB 2000 milliGRAM(s) IV Intermittent every 12 hours    Neuro:  acetaminophen   Oral Liquid .. 650 milliGRAM(s) Oral every 6 hours PRN    Anticoagulation:  heparin   Injectable 5000 Unit(s) SubCutaneous every 8 hours    OTHER:  acetylcysteine 10%  Inhalation 4 milliLiter(s) Inhalation every 6 hours  albuterol/ipratropium for Nebulization 3 milliLiter(s) Nebulizer every 6 hours  amLODIPine   Tablet 10 milliGRAM(s) Oral daily  artificial tears (preservative free) Ophthalmic Solution 1 Drop(s) Right EYE every 4 hours  doxazosin 8 milliGRAM(s) Oral at bedtime  erythromycin   Ointment 1 Application(s) Right EYE every 6 hours  ofloxacin 0.3% Solution 1 Drop(s) Right EYE every 6 hours  petrolatum Ophthalmic Ointment 1 Application(s) Both EYES two times a day  polyethylene glycol 3350 17 Gram(s) Oral two times a day  sodium zirconium cyclosilicate 5 Gram(s) Oral daily    IVF:  calcium acetate 667 milliGRAM(s) Oral three times a day with meals    CULTURES:  Culture Results:   No growth at 72 Hours (11-06 @ 20:05)  Culture Results:   Moderate Stenotrophomonas maltophilia  Commensal iram consistent with body site (11-03 @ 16:05)    RADIOLOGY & ADDITIONAL TESTS:    < from: Xray Chest 1 View- PORTABLE-Urgent (Xray Chest 1 View- PORTABLE-Urgent .) (11.10.24 @ 07:19) >  IMPRESSION: Limited inspiration. No acute infiltrates    --- End of Report ---    < end of copied text >    ASSESSMENT:  43 y/o PMH ?stroke/MI present to [facility] after collapsing at work. Decorticate posturing, vomiting, intubated for airway protection. 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 Transferred to [facility] for further management. s/p trach 10/14. s/p peg 10/21.      AMS    Handoff    MEWS Score    Acute myocardial infarction    CVA (cerebral vascular accident)    Intracerebral hemorrhage of brain stem    Brainstem stroke    Brain stem stroke syndrome    Brain stem hemorrhage    Brain stem stroke syndrome    Hemorrhagic stroke    Brainstem stroke    Encephalopathy acute    Functional quadriplegia    Advanced care planning/counseling discussion    Encounter for palliative care    Pontine hemorrhage    Neurogenic dysphagia    Chronic respiratory failure    Acute kidney injury superimposed on CKD    Acute urinary retention    Hypertensive emergency    Sepsis, unspecified organism    Sepsis    Gram-negative bacteremia    Percutaneous tracheal puncture    Altered mental status examination    EGD, with PEG    AMS    [email or web address]        PLAN:  Neuro:  - neuro/vitals q8  - pain control: Tylenol prn  - CTH 9/30: enlarged pontine hemorrhage, CTH 10/3: read stable, CTH 10/25 anisocoric pupils (R sluggish 6mm compared to left 3mm briskly reactive); showing mostly resolved pontine hemorrhage, continued brainstem hypodensity likely edema d/t hemorrhage, no new hemorrhage or infarct, no herniation, mild increase in size of left lateral ventricle  - vEEG (10/3-4)- negative, (10/17-10/19) - negative  - stroke core measures, stroke neuro signed off  - MRI brain w/ w/o contrast 10/12: parenchymal hemorrhage, acute/subacute R cerebellar stroke      CV:  - -160  - HTN: amlodipine 10 mg qd  - echo (9/30) EF 75%    Resp:  - tolerating trach collar 35%  - duonebs/mucomyst q6h  - chest PT vest BID    GI:  - Nepro TF via PEG (placed 10/21 by gen surg)  - bowel regimen, last BM 11/9  - CTAP 11/8 negative for infection      Renal:  - gabriel placed 10/29 d/t urinary retention - replaced 11/7  - FW flushes 250cc q6hrs   - hyperK 10/20: lokelma 5mg qd  - hyperPhos: phoslo 667mg TID   - Urinary retenion: Cardura 8 mg   - CKD: trend BUN/Cr  - Na goal 135-145  - renal US 10/1: echogenicity c/w chronic med renal dz, repeat 10/8: inc renal echogeicity, c/f medical renal disease w increased hydro     Endo:  - A1c 5.4    Heme:  - DVT ppx: SCDs, SQH 5000u q8h     ID:  - febrile, last pancx 11/10  - pan cultured 11/3 (w/out UA) , (+) sputum for stenotrophomas maltophlia, blood cx (+) klebsiella, cefepime 2gq12 (11/6 -)   - ID following  - empiric tx: s/p zosyn (11/3-11/6) , s/p vanc  (11/3- 11/5)  - S/p Ancef (10/4-10/14) for PNA, and s/p Unasyn (10/18-10/23) +actinobacter baumanii    MISC:  - ophtho consult for keratitis, rec erythromycin ointment to rt eye q4hrs, ofloxacin ointment to rt eye QID, artificial tears to rt eye q2hrs, moisture chamber at bedtime    Dispo: regional, full code, pending placement and emergency medicaid     D/w Dr. D'Amico   Assessment:  Present when checked    []  GCS  E   V  M     Heart Failure: []Acute, [] acute on chronic , []chronic  Heart Failure:  [] Diastolic (HFpEF), [] Systolic (HFrEF), []Combined (HFpEF and HFrEF), [] RHF, [] Pulm HTN, [] Other    [] ANIKA, [] ATN, [] AIN, [] other  [] CKD1, [] CKD2, [] CKD 3, [] CKD 4, [] CKD 5, []ESRD    Encephalopathy: [] Metabolic, [] Hepatic, [] toxic, [] Neurological, [] Other    Abnormal Nurtitional Status: [] malnurtition (see nutrition note), [ ]underweight: BMI < 19, [] morbid obesity: BMI >40, [] Cachexia    [] Sepsis  [] hypovolemic shock,[] cardiogenic shock, [] hemorrhagic shock, [] neuogenic shock  [] Acute Respiratory Failure  []Cerebral edema, [] Brain compression/ herniation,   [] Functional quadriplegia  [] Acute blood loss anemia

## 2024-11-12 LAB
ANION GAP SERPL CALC-SCNC: 13 MMOL/L — SIGNIFICANT CHANGE UP (ref 5–17)
ANION GAP SERPL CALC-SCNC: 9 MMOL/L — SIGNIFICANT CHANGE UP (ref 5–17)
BUN SERPL-MCNC: 75 MG/DL — HIGH (ref 7–23)
BUN SERPL-MCNC: 76 MG/DL — HIGH (ref 7–23)
CALCIUM SERPL-MCNC: 9.4 MG/DL — SIGNIFICANT CHANGE UP (ref 8.4–10.5)
CALCIUM SERPL-MCNC: 9.6 MG/DL — SIGNIFICANT CHANGE UP (ref 8.4–10.5)
CHLORIDE SERPL-SCNC: 107 MMOL/L — SIGNIFICANT CHANGE UP (ref 96–108)
CHLORIDE SERPL-SCNC: 108 MMOL/L — SIGNIFICANT CHANGE UP (ref 96–108)
CO2 SERPL-SCNC: 14 MMOL/L — LOW (ref 22–31)
CO2 SERPL-SCNC: 22 MMOL/L — SIGNIFICANT CHANGE UP (ref 22–31)
CREAT SERPL-MCNC: 2.13 MG/DL — HIGH (ref 0.5–1.3)
CREAT SERPL-MCNC: 2.18 MG/DL — HIGH (ref 0.5–1.3)
CULTURE RESULTS: SIGNIFICANT CHANGE UP
EGFR: 37 ML/MIN/1.73M2 — LOW
EGFR: 37 ML/MIN/1.73M2 — LOW
EGFR: 38 ML/MIN/1.73M2 — LOW
EGFR: 38 ML/MIN/1.73M2 — LOW
GLUCOSE SERPL-MCNC: 112 MG/DL — HIGH (ref 70–99)
GLUCOSE SERPL-MCNC: 124 MG/DL — HIGH (ref 70–99)
MAGNESIUM SERPL-MCNC: 2.8 MG/DL — HIGH (ref 1.6–2.6)
PHOSPHATE SERPL-MCNC: 5.4 MG/DL — HIGH (ref 2.5–4.5)
POTASSIUM SERPL-MCNC: 4.3 MMOL/L — SIGNIFICANT CHANGE UP (ref 3.5–5.3)
POTASSIUM SERPL-MCNC: SIGNIFICANT CHANGE UP (ref 3.5–5.3)
POTASSIUM SERPL-SCNC: 4.3 MMOL/L — SIGNIFICANT CHANGE UP (ref 3.5–5.3)
POTASSIUM SERPL-SCNC: SIGNIFICANT CHANGE UP (ref 3.5–5.3)
SODIUM SERPL-SCNC: 134 MMOL/L — LOW (ref 135–145)
SODIUM SERPL-SCNC: 139 MMOL/L — SIGNIFICANT CHANGE UP (ref 135–145)
SPECIMEN SOURCE: SIGNIFICANT CHANGE UP

## 2024-11-12 PROCEDURE — 99233 SBSQ HOSP IP/OBS HIGH 50: CPT

## 2024-11-12 PROCEDURE — 71045 X-RAY EXAM CHEST 1 VIEW: CPT | Mod: 26

## 2024-11-12 PROCEDURE — 99232 SBSQ HOSP IP/OBS MODERATE 35: CPT

## 2024-11-12 RX ADMIN — OFLOXACIN 1 DROP(S): 3 SOLUTION OPHTHALMIC at 11:57

## 2024-11-12 RX ADMIN — Medication 667 MILLIGRAM(S): at 14:55

## 2024-11-12 RX ADMIN — OFLOXACIN 1 DROP(S): 3 SOLUTION OPHTHALMIC at 18:07

## 2024-11-12 RX ADMIN — CEFEPIME 100 MILLIGRAM(S): 2 INJECTION, POWDER, FOR SOLUTION INTRAVENOUS at 06:32

## 2024-11-12 RX ADMIN — ERYTHROMYCIN 1 APPLICATION(S): 5 OINTMENT OPHTHALMIC at 17:02

## 2024-11-12 RX ADMIN — IPRATROPIUM BROMIDE AND ALBUTEROL SULFATE 3 MILLILITER(S): .5; 2.5 SOLUTION RESPIRATORY (INHALATION) at 11:58

## 2024-11-12 RX ADMIN — POLYETHYLENE GLYCOL 3350 17 GRAM(S): 17 POWDER, FOR SOLUTION ORAL at 06:35

## 2024-11-12 RX ADMIN — Medication 1 DROP(S): at 22:09

## 2024-11-12 RX ADMIN — HEPARIN SODIUM 5000 UNIT(S): 1000 INJECTION INTRAVENOUS; SUBCUTANEOUS at 14:54

## 2024-11-12 RX ADMIN — Medication 667 MILLIGRAM(S): at 17:02

## 2024-11-12 RX ADMIN — CEFEPIME 100 MILLIGRAM(S): 2 INJECTION, POWDER, FOR SOLUTION INTRAVENOUS at 17:02

## 2024-11-12 RX ADMIN — Medication 1 DROP(S): at 14:54

## 2024-11-12 RX ADMIN — ACETYLCYSTEINE 4 MILLILITER(S): 200 INHALANT RESPIRATORY (INHALATION) at 17:05

## 2024-11-12 RX ADMIN — Medication 1 APPLICATION(S): at 06:36

## 2024-11-12 RX ADMIN — ERYTHROMYCIN 1 APPLICATION(S): 5 OINTMENT OPHTHALMIC at 06:37

## 2024-11-12 RX ADMIN — OFLOXACIN 1 DROP(S): 3 SOLUTION OPHTHALMIC at 06:37

## 2024-11-12 RX ADMIN — HEPARIN SODIUM 5000 UNIT(S): 1000 INJECTION INTRAVENOUS; SUBCUTANEOUS at 06:36

## 2024-11-12 RX ADMIN — SODIUM ZIRCONIUM CYCLOSILICATE 5 GRAM(S): 5 POWDER, FOR SUSPENSION ORAL at 11:59

## 2024-11-12 RX ADMIN — OFLOXACIN 1 DROP(S): 3 SOLUTION OPHTHALMIC at 00:17

## 2024-11-12 RX ADMIN — ACETYLCYSTEINE 4 MILLILITER(S): 200 INHALANT RESPIRATORY (INHALATION) at 22:09

## 2024-11-12 RX ADMIN — HEPARIN SODIUM 5000 UNIT(S): 1000 INJECTION INTRAVENOUS; SUBCUTANEOUS at 22:09

## 2024-11-12 RX ADMIN — ACETYLCYSTEINE 4 MILLILITER(S): 200 INHALANT RESPIRATORY (INHALATION) at 06:36

## 2024-11-12 RX ADMIN — IPRATROPIUM BROMIDE AND ALBUTEROL SULFATE 3 MILLILITER(S): .5; 2.5 SOLUTION RESPIRATORY (INHALATION) at 06:36

## 2024-11-12 RX ADMIN — ERYTHROMYCIN 1 APPLICATION(S): 5 OINTMENT OPHTHALMIC at 11:57

## 2024-11-12 RX ADMIN — ERYTHROMYCIN 1 APPLICATION(S): 5 OINTMENT OPHTHALMIC at 00:17

## 2024-11-12 RX ADMIN — POLYETHYLENE GLYCOL 3350 17 GRAM(S): 17 POWDER, FOR SOLUTION ORAL at 17:03

## 2024-11-12 RX ADMIN — Medication 1 DROP(S): at 06:37

## 2024-11-12 RX ADMIN — AMLODIPINE BESYLATE 10 MILLIGRAM(S): 10 TABLET ORAL at 06:36

## 2024-11-12 RX ADMIN — Medication 1 DROP(S): at 11:58

## 2024-11-12 RX ADMIN — ACETYLCYSTEINE 4 MILLILITER(S): 200 INHALANT RESPIRATORY (INHALATION) at 00:16

## 2024-11-12 RX ADMIN — DOXAZOSIN MESYLATE 8 MILLIGRAM(S): 8 TABLET ORAL at 22:09

## 2024-11-12 RX ADMIN — Medication 1 DROP(S): at 17:03

## 2024-11-12 RX ADMIN — IPRATROPIUM BROMIDE AND ALBUTEROL SULFATE 3 MILLILITER(S): .5; 2.5 SOLUTION RESPIRATORY (INHALATION) at 17:02

## 2024-11-12 RX ADMIN — IPRATROPIUM BROMIDE AND ALBUTEROL SULFATE 3 MILLILITER(S): .5; 2.5 SOLUTION RESPIRATORY (INHALATION) at 00:15

## 2024-11-12 RX ADMIN — Medication 667 MILLIGRAM(S): at 11:58

## 2024-11-12 NOTE — PROGRESS NOTE ADULT - SUBJECTIVE AND OBJECTIVE BOX
Patient was seen and examined at bedside. Case discuss with the primary team.    no further fever , discussed with Nursing secretions have improved and frequency of suctioning is better than past 2 days       OBJECTIVE:  Vital Signs Last 24 Hrs  T(C): 36.5 (12 Nov 2024 20:10), Max: 36.5 (12 Nov 2024 20:10)  T(F): 97.7 (12 Nov 2024 20:10), Max: 97.7 (12 Nov 2024 20:10)  HR: 82 (12 Nov 2024 20:10) (71 - 85)  BP: 117/84 (12 Nov 2024 20:10) (117/84 - 133/86)  BP(mean): --  RR: 17 (12 Nov 2024 20:10) (16 - 18)  SpO2: 100% (12 Nov 2024 20:10) (97% - 100%)    Parameters below as of 12 Nov 2024 20:10  Patient On (Oxygen Delivery Method): tracheostomy collar  O2 Flow (L/min): 10  O2 Concentration (%): 40    PHYSICAL EXAM:  Gen: resting comfortably, NAD  HEENT: NCAT, MMM  Neck: trach with minimal secretions   CV: RRR, no m/r/g, peripheral pulses 2+  Pulm: CTAB, no increased work of breathing, no rales/rhonchi  Abd: soft, ND, NT, PEG  Neuro: AAOx 2     LABS:                                              12.8   10.72 )-----------( 193      ( 12 Nov 2024 10:22 )             41.3     11-12    139  |  108  |  75[H]  ----------------------------<  112[H]  4.3   |  22  |  2.18[H]    Ca    9.4      12 Nov 2024 17:07  Phos  5.4     11-12  Mg     2.8     11-12    11          11/10 CXR:  Limited inspiration.  11/11 CXR - Left lung base infiltrate   11/10 EKG  Sinus tach @105bpm       acetaminophen   Oral Liquid .. 650 milliGRAM(s) Oral every 6 hours PRN  acetylcysteine 10%  Inhalation 4 milliLiter(s) Inhalation every 6 hours  albuterol/ipratropium for Nebulization 3 milliLiter(s) Nebulizer every 6 hours  amLODIPine   Tablet 10 milliGRAM(s) Oral daily  artificial tears (preservative free) Ophthalmic Solution 1 Drop(s) Right EYE every 4 hours  calcium acetate 667 milliGRAM(s) Oral three times a day with meals  cefepime   IVPB 2000 milliGRAM(s) IV Intermittent every 12 hours  doxazosin 8 milliGRAM(s) Oral at bedtime  erythromycin   Ointment 1 Application(s) Right EYE every 6 hours  heparin   Injectable 5000 Unit(s) SubCutaneous every 8 hours  ofloxacin 0.3% Solution 1 Drop(s) Right EYE every 6 hours  petrolatum Ophthalmic Ointment 1 Application(s) Both EYES two times a day  polyethylene glycol 3350 17 Gram(s) Oral two times a day  sodium zirconium cyclosilicate 5 Gram(s) Oral daily

## 2024-11-12 NOTE — PROGRESS NOTE ADULT - ASSESSMENT
44 YOF with unclear PMH (?stroke, MI) who was found down at work, intubated for airway protection and found to have acute parenchymal hemorrhage within nabil with mass effect (+ acute/subacute right cerebellar infarct) in setting of hypertensive emergency, transferred to [facility] for further neurosurgical care. Hospital course c/b poor neurologic recovery s/p trach-PEG, AUR s/p gabriel, ANIKA on CKD c/b hyperkalemia, HAP s/p amp-sulbactam (EOT 10/23), K aerogenes bacteremia.       #Gram-negative bacteremia.   #Fever ? LLL pneumonia   -Pt's blood cx on 11/3 positive K aerogenes  - Source of bacteremia suspected  however there is no urine cx data given chronic gabriel (s/p exchanged 11/7)  -Pt's surveillance blood cx sent on 11/6 was NGTD   -Spoke to ID  who recommended continuing  cefepime 2g q12 for now pending blood cx and urine cx   -F/u Sputum Cx     # Pontine hemorrhage.   -The pt was found to have acute parenchymal hemorrhage within nabil with mass effect (+ acute/subacute right cerebellar infarct) in setting of hypertensive emergency.   -The pt had an MRI brain also demonstrated acute/subacute R cerebellar stroke.   - Will continue management per NSGY  -Will continue pain management and bowel regimen as per NSGY team     # Hypertension  -Will continue amlodipine 10mg daily     #Acute kidney injury superimposed on CKD.   -Pt s/p US renal with chronic medical renal disease. Hospital course c/b ATN.   -Will continue to renally dose medications and avoid nephrotoxins  - Will continue to monitor creatinine downtrending  Creatinine 2.52->2.47     #Hyperkalemia   - Pt had hyperkalemia now on lokelma 5g qd with improvement; Pt's K was hemolyzed on today's labs will repeat.     #Hyperphosphatemia   -Pt  had hyperphosphatemia now on phoslo 776 mg TID now within normal limits will continue to monitor.     # Chronic respiratory failure.   -Pt s/p percutaneous trach by pulm on 10/14/24  - Will continue  trach collar   - Will continue routine trach care and suctioning PRN  - Will continue duo-neb with mucomyst q6hr standing     # Neurogenic dysphagia.   -Pt s/pPEG with surgery 10/21/24  - TF per nutrition  - aspiration precautions and elevation of HOB.    #Acute urinary retention.   -Pt s/p gabriel placement  10/24 for urinary retention, failed TOV 10/27.   - doxazosin 8mg.    # Functional quadriplegia in  setting of brainstem hemorrhage  - decub precautions.    #DVT prop  -Continue Heparin SQ q8     #DISPO  -TBD - pending PRUCOL application    55 minutes spent on total encounter. The necessity of the time spent during the encounter on this date of service was due to:    Review of hospital course, labs, vitals, medical records.  Bedside exam   Discussed plan of care with primary team   Documenting the encounter.

## 2024-11-12 NOTE — PROGRESS NOTE ADULT - SUBJECTIVE AND OBJECTIVE BOX
HPI:  Unknown age male, unknown past medical history (reported stroke and MI by coworkers) presented to [facility] with AMS, Pt was working at [employer] when he was found down by coworkers. EMS called and pt brought to [facility] ED. Intubated, sedated, started on cardene for SBPs in 200s. CT head showed brain stem bleed. Transferred to NSICU for further management.  (30 Sep 2024 12:55)    INTERVAL EVENTS: BD 43, GEORGE ovn, fever and HR downtrending, satting 97% 70% FIO2    Hospital course:   9/30: transferred from [facility]. A line placed. Versed dc'd. Timate Prasanth at bedside, states pt has family in [place], cannot confirm medications or PMH other than stroke and MI. 250cc bolus 3% given. LR switched to NS. hydralazine 25q8 started, 3% started, switched propofol to precedex   10/1: stability CTH done. Added labetalol, started TF. Palliative consulted. ethics consulted to determine surrogate. febrile 103, pan cx sent  10/2: BD 2, GEORGE overnight. TF resumed. Desatt'd to 80s, FiO2 inc. to 50. Fentanyl given, ABG, CXR ordered. Maxxed on precedex, started on propofol for DARIEN -4 - -5. Precedex dc'd. Duonebs, mucomyst, hypertonic added. 3% dc'd. Cardene dc'd. Start vanc/CTX. Increased labetalol 200q8. MRSA negative, dc'd vanc. ETT pulled back 2cm x 2, good positioning after confirmatory chest xray. Ethics attempting to establish HCP with family. Na 159, starting FW 250q6 for range 150-155.   10/3: BD3, GEORGE o/n, neuro stable. Na elevating, FW increased to 300q6. Dc'd bowel reg for diarrhea. vEEG started. SQH 5000q8 tonight.   10/4: BD 4, albumn bolus, incr. LR to 80 2/2 incr. in Cr, LR to 100cc/hr for uptrending Cr. Started 7% hypersal for 48hrs and SL atropine for inline/oral thick secretions. Dc'd CTX and started ancef for MSSA in the sputum. Nephrology consulted for CKD, f/u recs. SBP 170s, given hydralazine 10mg IVP.   10/5: BD5, o/n 10mg IVP hydralazine given for SBP 170s and started on hydralazine 25q8 via OGT. 10mg IV push labetalol for SBP > 160s. RT placed for diarrhea.   10/6: BD6, o/n FW increased to 350q4 per nephrology recs. IV tylenol for temp 100.6, SBp 160s presumed uncomfortable.   10/7: BD7, overnight pancultured for temp 101.8F.   10/8: BD8. GEORGE. Cr bumped. decreased LR to 75cc/hr. Adding simethicone ATC. incr hydralazine 50mgTID. Incr labetalol 300mgTID. Na 145, decreased FWF to 250q6. Start precedex. FENa consistent with intrinsic kidney injury. Pend repeat renal US. Retaining up to 1.3L, bladder scans q6, straight cath PRN  10/9: BD 9. GEORGE overnight. Neuro stable. abd xray for distention w non-specific gas pattern, OGT to LIWS for morning. duonebs/mucomyst to q8 for improving secretions. Changed tube feeds to Jevity 1.5 20cc/hr, low rate due to abdominal distention, nepro dense and more difficult to digest. Tolerating CPAP, confirmed by ABG.   10/10: BD 10. GEORGE overnight. Neuro stable. (+) gabriel for urinary retention on bladder scan. inc TF to goal rate of 40cc/hr. family leaning toward pursuing trach/PEG. 1/2 amp for FS 81.   10/11: BD 11. GEORGE overnight. Neuro stable. Trach/PEG consults placed.   10/12: BD 12. GEORGE overnight. Neuro stable. MRI brain complete.   10/13: BD 13. Increase flomax. Hold SQH after PM dose for trach tm. IVL.   10/14: BD 14. GEORGE overnight, remains on AC/VC. Gabriel placed for urinary retention. Dc'd free water.  S/p trach with pulm. NGT placed and CXR confirmed in good position.   10/15: BD 15, GEORGE ovn. resumed feeds. spiked 101, pan cx sent.   10/16: BD 16. GEORGE ovn. Lokelma 5mg for K+ 5.4. Started vanc q 24/zosyn for empiric PNA coverage, IVF to 100/hr. PEG held for fever.   10/17: BD 17,  ordered serum osm and urine osm for am. Started sinemet for neurostimulation. Increased cardura to 0.8. Started FW 100q4, dc'd IVF. MRSA negative, dc'd vanc. NGT replaced d/t coiling.   10/18: BD 18, GEORGE overnight, neuro stable. Amantadine added for neurostim. zosyn changed to unasyn for acinetobacter baumannii, failed TOV and required SC  10/19: BD 19, GEORGE ovn. cardura 2mg added for retention. labetalol decreased 200q8, hydralazine decreased 25q8. Gabriel replaced.   10/20: BD20, GEORGE overnight. NGT dislodged, replaced. PEG tomorrow w/ gen surg, FW increased to 150q4 and labetalol decreased to 100q8, lokelma given for hyperkalemia.   10/21: BD 21. POD0 PEG placement with Gen surg. decr labetolol to 50q8, incr. cardura to 0.4, started lokelma and phoslo, dc gabriel POD0 PEG placement with Gen surg.  10/22: BD 22. Plan to start TF today via PEG. dc labetalol, Following ophtho recs. Increased apnea settings - found to be in cheyne-moe respiration. CPAP 5/5.  10/23: BD 23. hydralazine d/c'd, trach collar trial today. Rectal tube placed at 6am.  10/24: BD24, o/n lokelma held due to diarrhea. Free water 100q6 resumed. dc'd tamsulosin, amantadine. Incr'd cardura to 8mg qhs. Dc'd FW. Switched jevity to nepro. gabriel placed for high urine output. Started SL atropine for oral secretions. Dc'd free water.  10/25: BD25, o/n decreased suctioning requirements to > q4hrs, GEORGE. Cr improving, cont phoslo, lokelma held at this time. Gabriel placed yest, cont. Tolerating trach collar. Given 500cc plasmalyte bolus for ANIKA. Dc'd sinemet.   10/26: BD26, o/n resumed lokelma 5mg daily and resumed 100cc free water q6hrs. Change in neuro status with new right pupillary dilation with anisocoria (right pupil 6mm fixed and left pupil 3mm briskly reactive). Given 23.4% NaCl bullet, taken for emergent CTH showing mostly resolved pontine hemorrhage, continued brainstem hypodensity likely edema d/t hemorrhage, no new hemorrhage or infarct, no herniation, mild increase in size of left lateral ventricle. Vitals remaine stable. Na goal > 140.   10/27: BD27, o/n GEORGE.Neuro stable. Pend stepdown with airway bed.   10/28: BD 28. GEORGE overnight. Neuro stable. Miralax ordered. Gabriel removed, pending TOV.  10/29: BD 29. GEORGE o/n. Given 2L NS over 8 hrs for increased BUN/Cr ratio. Gabriel placed for frequent straight cath.   10/30: BD 30.   10/31: BD 31. GEORGE overnight. Na 149, increased free water to 200q6. 1L NS for uptrending BUN.   11/1: BD 32. GEORGE overnight. Given 1L NS for dehydration. Na 146, increased FW to 250q6.   11/2: BD 33 GEORGE overnight, neuro stable, given 500cc bolus for net negative status and tachycardia   11/3: BD 34, GEORGE overnight, neuro stable. Patient remains tachycardic, EKG showing sinus tachycardia, given additional 500cc NS bolus. Febrile to 101.9F, pan cultured (without UA), CXR WNL, given tylenol.   11/4: BD 35, GEORGE overnight, neuro stable. Given 1L NS for tachycardia. sputum (+) for stenotropohomas maltophilia.   11/5: BD 36 GEORGE overnight, neuro stable. Vancomycin dc'd. Chest PT BID. ID consulted, cont zosyn.  11/6: BD 37. blood cx + klebsiella dc zosyn changed to cefepime, CTAP ordered, rpt blood cx sent.    11/7: BD 38. Pending CT A/P, given 250cc bolus and starting maintenance fluids overnight. Pending CT A/P after bolus   11/8: BD 39. CT CAP negative for infection.   11/9: BD 40. GEORGE overnight.  11/10: BD 41. GEORGE overnight. desat to 85 on trach collar, O2 inc to 10L and 100%, O2 sat inc to 95. pt tachy to 110s, euvolemic. given tylenol. ABG and CXR ordered. spiked fever, pancultured, RVP negative. AM ABG w pO2 79, rpt w pO2 79. pt appears comfortable, satting 94%.   11/11: BD 42. GEORGE overnight. pt became tachy to 130s, desat to 90 on 100% FiO2 and 10L. suctioned, (+) productive cough. temp 101.4, given 1g IV tylenol and 500cc NS bolus for euvolemia. fever and HR downtrending. LE dopplers negative for dvt  11/12: BD 43, GEORGE ovn, fever and HR downtrending, satting 97% 70% FIO2    Vital Signs Last 24 Hrs  T(C): 36.7 (11 Nov 2024 20:40), Max: 38.6 (11 Nov 2024 02:45)  T(F): 98.1 (11 Nov 2024 20:40), Max: 101.4 (11 Nov 2024 02:45)  HR: 88 (11 Nov 2024 20:40) (88 - 991)  BP: 134/90 (11 Nov 2024 20:40) (119/80 - 135/92)  BP(mean): --  RR: 17 (11 Nov 2024 20:40) (17 - 19)  SpO2: 97% (11 Nov 2024 20:40) (91% - 100%)    Parameters below as of 11 Nov 2024 20:40  Patient On (Oxygen Delivery Method): tracheostomy collar  O2 Flow (L/min): 10      I&O's Summary    10 Nov 2024 07:01  -  11 Nov 2024 07:00  --------------------------------------------------------  IN: 2130 mL / OUT: 1575 mL / NET: 555 mL    11 Nov 2024 07:01  -  12 Nov 2024 00:37  --------------------------------------------------------  IN: 180 mL / OUT: 1100 mL / NET: -920 mL        PHYSICAL EXAM:  General: NAD, pt is sitting up in bed, on trach collar to 35% FiO2  HEENT: left pupil 4mm reactive, right pupil 3mm briskly reactive; EOMI vertically b/l  Cardiovascular: RRR, S1, S2  Respiratory: breathing non-labored on trach collar, chest rise symmetric  GI: abd soft, NTND   Neuro: A&Ox0, intermittently follows commands.  RUE trace wiggles fingers to command, RLE briskly w/d to noxious; LUE 0/5, LLE w/d to noxious.   Unable to assess sensation secondary to mental status   Extremities: distal pulses 2+ x4  Wound/incision: n/a  Drain: (+) trach (+) PEG (+) gabriel    LABS:                        11.7   9.39  )-----------( 216      ( 11 Nov 2024 07:24 )             37.3     11-11    139  |  108  |  76[H]  ----------------------------<  136[H]  4.7   |  20[L]  |  2.69[H]    Ca    9.1      11 Nov 2024 07:24  Phos  3.6     11-11  Mg     2.5     11-11        Urinalysis Basic - ( 11 Nov 2024 07:24 )    Color: x / Appearance: x / SG: x / pH: x  Gluc: 136 mg/dL / Ketone: x  / Bili: x / Urobili: x   Blood: x / Protein: x / Nitrite: x   Leuk Esterase: x / RBC: x / WBC x   Sq Epi: x / Non Sq Epi: x / Bacteria: x          CAPILLARY BLOOD GLUCOSE          Drug Levels: [] N/A    CSF Analysis: [] N/A      Allergies    Allergy Status Unknown    Intolerances        Home Medications:      MEDICATIONS:  MEDICATIONS  (STANDING):  acetylcysteine 10%  Inhalation 4 milliLiter(s) Inhalation every 6 hours  albuterol/ipratropium for Nebulization 3 milliLiter(s) Nebulizer every 6 hours  amLODIPine   Tablet 10 milliGRAM(s) Oral daily  artificial tears (preservative free) Ophthalmic Solution 1 Drop(s) Right EYE every 4 hours  calcium acetate 667 milliGRAM(s) Oral three times a day with meals  cefepime   IVPB 2000 milliGRAM(s) IV Intermittent every 12 hours  doxazosin 8 milliGRAM(s) Oral at bedtime  erythromycin   Ointment 1 Application(s) Right EYE every 6 hours  heparin   Injectable 5000 Unit(s) SubCutaneous every 8 hours  ofloxacin 0.3% Solution 1 Drop(s) Right EYE every 6 hours  petrolatum Ophthalmic Ointment 1 Application(s) Both EYES two times a day  polyethylene glycol 3350 17 Gram(s) Oral two times a day  sodium zirconium cyclosilicate 5 Gram(s) Oral daily    MEDICATIONS  (PRN):  acetaminophen   Oral Liquid .. 650 milliGRAM(s) Oral every 6 hours PRN Temp greater or equal to 38C (100.4F), Mild Pain (1 - 3)      CULTURES:  Culture Results:   No growth (11-10 @ 17:21)  Culture Results:   No growth at 4 days (11-06 @ 20:05)      RADIOLOGY & ADDITIONAL TESTS:      ASSESSMENT:  43 y/o PMH ?stroke/MI present to [facility] after collapsing at work. Decorticate posturing, vomiting, intubated for airway protection. 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 Transferred to [facility] for further management. s/p trach 10/14. s/p peg 10/21.      PLAN:  Neuro:  - neuro/vitals q8  - pain control: Tylenol prn  - CTH 9/30: enlarged pontine hemorrhage, CTH 10/3: read stable, CTH 10/25 anisocoric pupils (R sluggish 6mm compared to left 3mm briskly reactive); showing mostly resolved pontine hemorrhage, continued brainstem hypodensity likely edema d/t hemorrhage, no new hemorrhage or infarct, no herniation, mild increase in size of left lateral ventricle  - vEEG (10/3-4)- negative, (10/17-10/19) - negative  - stroke core measures, stroke neuro signed off  - MRI brain w/ w/o contrast 10/12: parenchymal hemorrhage, acute/subacute R cerebellar stroke      CV:  - -160  - HTN: amlodipine 10 mg qd  - echo (9/30) EF 75%    Resp:  - tolerating trach collar 100%  - duonebs/mucomyst q6h  - chest PT vest BID    GI:  - Nepro TF via PEG (placed 10/21 by gen surg)  - bowel regimen, last BM 11/9  - CTAP 11/8 negative for infection      Renal:  - gabriel placed 10/29 d/t urinary retention - replaced 11/7  - FW flushes 250cc q6hrs   - hyperK 10/20: lokelma 5mg qd  - hyperPhos: phoslo 667mg TID   - Urinary retenion: Cardura 8 mg   - CKD: trend BUN/Cr  - Na goal 135-145  - renal US 10/1: echogenicity c/w chronic med renal dz, repeat 10/8: inc renal echogeicity, c/f medical renal disease w increased hydro     Endo:  - A1c 5.4    Heme:  - DVT ppx: SCDs, SQH 5000u q8h   - LE dopplers negative 11/11    ID:  - febrile, last pancx 11/10  - pan cultured 11/3 (w/out UA) , (+) sputum for stenotrophomas maltophlia, blood cx (+) klebsiella, cefepime 2gq12 (11/6 -)   - ID following  - empiric tx: s/p zosyn (11/3-11/6) , s/p vanc  (11/3- 11/5)  - S/p Ancef (10/4-10/14) for PNA, and s/p Unasyn (10/18-10/23) +actinobacter baumanii    MISC:  - ophtho consult for keratitis, rec erythromycin ointment to rt eye q4hrs, ofloxacin ointment to rt eye QID, artificial tears to rt eye q2hrs, moisture chamber at bedtime    Dispo: regional, full code, pending placement and emergency medicaid     D/w Dr. D'Amico     Assessment: present when checked     [] GCS   E   V   M     Heart Failure: [] Acute, [] acute on chronic, [] chronic   Heart Failure: [] Diastolic (HFpEF), [] Systolic (HRrEF), [] Combined (HFpEF and HFrEF), [] RHF, [] Pulm HTN, [] Other     [] ANIKA, [] ATN, [] AIN, [] other   [] CKD1, [] CKD2, [] CKD3, [] CKD4, [] CKD5, [] ESRD     Encephalopathy: [] Metabolic, [] Hepatic, [] Toxic, [] Neurological, [] Other     Abnormal Nutritional Status: [] malnutrition (see nutrition note), []underweight: BMI <19, [] morbid obesity: BMI >40, [] Cachexia     [] Sepsis   [] Hypovolemic shock, [] Cardiogenic shock, [] Hemorrhagic shock, [] Neurogenic shock   [] Acute respiratory failure   [] Cerebral edema, [] Brain compression / herniation   [] Functional quadriplegia   [] Acute blood loss anemia

## 2024-11-13 PROCEDURE — 71045 X-RAY EXAM CHEST 1 VIEW: CPT | Mod: 26

## 2024-11-13 PROCEDURE — 71275 CT ANGIOGRAPHY CHEST: CPT | Mod: 26

## 2024-11-13 PROCEDURE — 99232 SBSQ HOSP IP/OBS MODERATE 35: CPT

## 2024-11-13 PROCEDURE — 99233 SBSQ HOSP IP/OBS HIGH 50: CPT

## 2024-11-13 RX ORDER — IPRATROPIUM BROMIDE AND ALBUTEROL SULFATE .5; 2.5 MG/3ML; MG/3ML
3 SOLUTION RESPIRATORY (INHALATION) EVERY 6 HOURS
Refills: 0 | Status: DISCONTINUED | OUTPATIENT
Start: 2024-11-13 | End: 2024-11-15

## 2024-11-13 RX ORDER — ACETAMINOPHEN 500 MG/5ML
650 LIQUID (ML) ORAL EVERY 8 HOURS
Refills: 0 | Status: COMPLETED | OUTPATIENT
Start: 2024-11-13 | End: 2024-11-14

## 2024-11-13 RX ADMIN — ACETYLCYSTEINE 4 MILLILITER(S): 200 INHALANT RESPIRATORY (INHALATION) at 04:08

## 2024-11-13 RX ADMIN — Medication 650 MILLIGRAM(S): at 22:36

## 2024-11-13 RX ADMIN — IPRATROPIUM BROMIDE AND ALBUTEROL SULFATE 3 MILLILITER(S): .5; 2.5 SOLUTION RESPIRATORY (INHALATION) at 06:15

## 2024-11-13 RX ADMIN — HEPARIN SODIUM 5000 UNIT(S): 1000 INJECTION INTRAVENOUS; SUBCUTANEOUS at 22:37

## 2024-11-13 RX ADMIN — AMLODIPINE BESYLATE 10 MILLIGRAM(S): 10 TABLET ORAL at 06:14

## 2024-11-13 RX ADMIN — IPRATROPIUM BROMIDE AND ALBUTEROL SULFATE 3 MILLILITER(S): .5; 2.5 SOLUTION RESPIRATORY (INHALATION) at 12:07

## 2024-11-13 RX ADMIN — Medication 1 DROP(S): at 13:59

## 2024-11-13 RX ADMIN — Medication 1 DROP(S): at 02:00

## 2024-11-13 RX ADMIN — Medication 667 MILLIGRAM(S): at 13:59

## 2024-11-13 RX ADMIN — Medication 1 DROP(S): at 17:57

## 2024-11-13 RX ADMIN — Medication 1 APPLICATION(S): at 06:14

## 2024-11-13 RX ADMIN — Medication 80 MILLILITER(S): at 19:09

## 2024-11-13 RX ADMIN — IPRATROPIUM BROMIDE AND ALBUTEROL SULFATE 3 MILLILITER(S): .5; 2.5 SOLUTION RESPIRATORY (INHALATION) at 00:42

## 2024-11-13 RX ADMIN — ERYTHROMYCIN 1 APPLICATION(S): 5 OINTMENT OPHTHALMIC at 17:57

## 2024-11-13 RX ADMIN — OFLOXACIN 1 DROP(S): 3 SOLUTION OPHTHALMIC at 06:13

## 2024-11-13 RX ADMIN — ERYTHROMYCIN 1 APPLICATION(S): 5 OINTMENT OPHTHALMIC at 12:07

## 2024-11-13 RX ADMIN — ERYTHROMYCIN 1 APPLICATION(S): 5 OINTMENT OPHTHALMIC at 06:13

## 2024-11-13 RX ADMIN — OFLOXACIN 1 DROP(S): 3 SOLUTION OPHTHALMIC at 00:41

## 2024-11-13 RX ADMIN — SODIUM ZIRCONIUM CYCLOSILICATE 5 GRAM(S): 5 POWDER, FOR SUSPENSION ORAL at 12:07

## 2024-11-13 RX ADMIN — ACETYLCYSTEINE 4 MILLILITER(S): 200 INHALANT RESPIRATORY (INHALATION) at 09:51

## 2024-11-13 RX ADMIN — Medication 667 MILLIGRAM(S): at 09:51

## 2024-11-13 RX ADMIN — Medication 650 MILLIGRAM(S): at 09:51

## 2024-11-13 RX ADMIN — OFLOXACIN 1 DROP(S): 3 SOLUTION OPHTHALMIC at 17:57

## 2024-11-13 RX ADMIN — ACETYLCYSTEINE 4 MILLILITER(S): 200 INHALANT RESPIRATORY (INHALATION) at 15:26

## 2024-11-13 RX ADMIN — POLYETHYLENE GLYCOL 3350 17 GRAM(S): 17 POWDER, FOR SOLUTION ORAL at 06:14

## 2024-11-13 RX ADMIN — Medication 667 MILLIGRAM(S): at 17:56

## 2024-11-13 RX ADMIN — Medication 1 DROP(S): at 06:13

## 2024-11-13 RX ADMIN — Medication 1 DROP(S): at 09:51

## 2024-11-13 RX ADMIN — Medication 1 DROP(S): at 23:11

## 2024-11-13 RX ADMIN — HEPARIN SODIUM 5000 UNIT(S): 1000 INJECTION INTRAVENOUS; SUBCUTANEOUS at 06:14

## 2024-11-13 RX ADMIN — Medication 650 MILLIGRAM(S): at 10:20

## 2024-11-13 RX ADMIN — POLYETHYLENE GLYCOL 3350 17 GRAM(S): 17 POWDER, FOR SOLUTION ORAL at 17:57

## 2024-11-13 RX ADMIN — ERYTHROMYCIN 1 APPLICATION(S): 5 OINTMENT OPHTHALMIC at 00:42

## 2024-11-13 RX ADMIN — HEPARIN SODIUM 5000 UNIT(S): 1000 INJECTION INTRAVENOUS; SUBCUTANEOUS at 13:59

## 2024-11-13 RX ADMIN — DOXAZOSIN MESYLATE 8 MILLIGRAM(S): 8 TABLET ORAL at 22:36

## 2024-11-13 RX ADMIN — Medication 1 APPLICATION(S): at 17:57

## 2024-11-13 RX ADMIN — OFLOXACIN 1 DROP(S): 3 SOLUTION OPHTHALMIC at 12:07

## 2024-11-13 RX ADMIN — Medication 650 MILLIGRAM(S): at 13:59

## 2024-11-13 NOTE — PROGRESS NOTE ADULT - SUBJECTIVE AND OBJECTIVE BOX
Patient was seen and examined at bedside. Case discuss with the primary team.    no further fever , discussed with Nursing secretions have improved and frequency of suctioning is better than past 2 days     however today the patient is tachycardic and tachypneic , no fever       OBJECTIVE:  Vital Signs Last 24 Hrs  T(C): 37.2 (13 Nov 2024 15:31), Max: 37.3 (13 Nov 2024 13:24)  T(F): 99 (13 Nov 2024 15:31), Max: 99.2 (13 Nov 2024 13:24)  HR: 111 (13 Nov 2024 15:31) (71 - 119)  BP: 122/82 (13 Nov 2024 15:31) (109/77 - 147/83)  BP(mean): 95 (13 Nov 2024 15:31) (95 - 95)  RR: 16 (13 Nov 2024 15:31) (16 - 20)  SpO2: 95% (13 Nov 2024 15:31) (92% - 100%)    Parameters below as of 13 Nov 2024 13:38  Patient On (Oxygen Delivery Method): tracheostomy collar    O2 Concentration (%): 100        PHYSICAL EXAM:  Gen: resting comfortably, NAD  HEENT: NCAT, MMM  Neck: trach with minimal secretions   CV: RRR, no m/r/g, peripheral pulses 2+ , tachycardia   Pulm: CTAB, no increased work of breathing, no rales/rhonchi , tachypnea   Abd: soft, ND, NT, PEG  Neuro: AAOx 2     LABS:                                                         12.8   10.72 )-----------( 193      ( 12 Nov 2024 10:22 )             41.3   11-12    139  |  108  |  75[H]  ----------------------------<  112[H]  4.3   |  22  |  2.18[H]    Ca    9.4      12 Nov 2024 17:07  Phos  5.4     11-12  Mg     2.8     11-12               CXR:  Limited inspiration.     EKG  Sinus tach @105bpm       MEDICATIONS  (STANDING):  acetaminophen   Oral Liquid .. 650 milliGRAM(s) Oral every 8 hours  acetylcysteine 10%  Inhalation 4 milliLiter(s) Inhalation every 6 hours  albuterol/ipratropium for Nebulization 3 milliLiter(s) Nebulizer every 6 hours  amLODIPine   Tablet 10 milliGRAM(s) Oral daily  artificial tears (preservative free) Ophthalmic Solution 1 Drop(s) Right EYE every 4 hours  calcium acetate 667 milliGRAM(s) Oral three times a day with meals  doxazosin 8 milliGRAM(s) Oral at bedtime  erythromycin   Ointment 1 Application(s) Right EYE every 6 hours  heparin   Injectable 5000 Unit(s) SubCutaneous every 8 hours  ofloxacin 0.3% Solution 1 Drop(s) Right EYE every 6 hours  petrolatum Ophthalmic Ointment 1 Application(s) Both EYES two times a day  polyethylene glycol 3350 17 Gram(s) Oral two times a day  sodium zirconium cyclosilicate 5 Gram(s) Oral daily    MEDICATIONS  (PRN):

## 2024-11-13 NOTE — PROGRESS NOTE ADULT - SUBJECTIVE AND OBJECTIVE BOX
HPI:  Unknown age male, unknown past medical history (reported stroke and MI by coworkers) presented to [facility] with AMS, Pt was working at [employer] when he was found down by coworkers. EMS called and pt brought to [facility] ED. Intubated, sedated, started on cardene for SBPs in 200s. CT head showed brain stem bleed. Transferred to NSICU for further management.  (30 Sep 2024 12:55)    INTERVAL EVENTS: BD 44, GEORGE ovn    Hospital course:   9/30: transferred from [facility]. A line placed. Versed dc'd. Cy Rader at bedside, states pt has family in [place], cannot confirm medications or PMH other than stroke and MI. 250cc bolus 3% given. LR switched to NS. hydralazine 25q8 started, 3% started, switched propofol to precedex   10/1: stability CTH done. Added labetalol, started TF. Palliative consulted. ethics consulted to determine surrogate. febrile 103, pan cx sent  10/2: BD 2, GEORGE overnight. TF resumed. Desatt'd to 80s, FiO2 inc. to 50. Fentanyl given, ABG, CXR ordered. Maxxed on precedex, started on propofol for DARIEN -4 - -5. Precedex dc'd. Duonebs, mucomyst, hypertonic added. 3% dc'd. Cardene dc'd. Start vanc/CTX. Increased labetalol 200q8. MRSA negative, dc'd vanc. ETT pulled back 2cm x 2, good positioning after confirmatory chest xray. Ethics attempting to establish HCP with family. Na 159, starting FW 250q6 for range 150-155.   10/3: BD3, GEORGE o/n, neuro stable. Na elevating, FW increased to 300q6. Dc'd bowel reg for diarrhea. vEEG started. SQH 5000q8 tonight.   10/4: BD 4, albumn bolus, incr. LR to 80 2/2 incr. in Cr, LR to 100cc/hr for uptrending Cr. Started 7% hypersal for 48hrs and SL atropine for inline/oral thick secretions. Dc'd CTX and started ancef for MSSA in the sputum. Nephrology consulted for CKD, f/u recs. SBP 170s, given hydralazine 10mg IVP.   10/5: BD5, o/n 10mg IVP hydralazine given for SBP 170s and started on hydralazine 25q8 via OGT. 10mg IV push labetalol for SBP > 160s. RT placed for diarrhea.   10/6: BD6, o/n FW increased to 350q4 per nephrology recs. IV tylenol for temp 100.6, SBp 160s presumed uncomfortable.   10/7: BD7, overnight pancultured for temp 101.8F.   10/8: BD8. GEORGE. Cr bumped. decreased LR to 75cc/hr. Adding simethicone ATC. incr hydralazine 50mgTID. Incr labetalol 300mgTID. Na 145, decreased FWF to 250q6. Start precedex. FENa consistent with intrinsic kidney injury. Pend repeat renal US. Retaining up to 1.3L, bladder scans q6, straight cath PRN  10/9: BD 9. GEORGE overnight. Neuro stable. abd xray for distention w non-specific gas pattern, OGT to LIWS for morning. duonebs/mucomyst to q8 for improving secretions. Changed tube feeds to Jevity 1.5 20cc/hr, low rate due to abdominal distention, nepro dense and more difficult to digest. Tolerating CPAP, confirmed by ABG.   10/10: BD 10. GEORGE overnight. Neuro stable. (+) gabriel for urinary retention on bladder scan. inc TF to goal rate of 40cc/hr. family leaning toward pursuing trach/PEG. 1/2 amp for FS 81.   10/11: BD 11. GEORGE overnight. Neuro stable. Trach/PEG consults placed.   10/12: BD 12. GEORGE overnight. Neuro stable. MRI brain complete.   10/13: BD 13. Increase flomax. Hold SQH after PM dose for trach tm. IVL.   10/14: BD 14. GEORGE overnight, remains on AC/VC. Gabriel placed for urinary retention. Dc'd free water.  S/p trach with pulm. NGT placed and CXR confirmed in good position.   10/15: BD 15, GEORGE ovn. resumed feeds. spiked 101, pan cx sent.   10/16: BD 16. GEORGE ovn. Lokelma 5mg for K+ 5.4. Started vanc q 24/zosyn for empiric PNA coverage, IVF to 100/hr. PEG held for fever.   10/17: BD 17,  ordered serum osm and urine osm for am. Started sinemet for neurostimulation. Increased cardura to 0.8. Started FW 100q4, dc'd IVF. MRSA negative, dc'd vanc. NGT replaced d/t coiling.   10/18: BD 18, GEORGE overnight, neuro stable. Amantadine added for neurostim. zosyn changed to unasyn for acinetobacter baumannii, failed TOV and required SC  10/19: BD 19, GEORGE ovn. cardura 2mg added for retention. labetalol decreased 200q8, hydralazine decreased 25q8. Gabriel replaced.   10/20: BD20, GEORGE overnight. NGT dislodged, replaced. PEG tomorrow w/ gen surg, FW increased to 150q4 and labetalol decreased to 100q8, lokelma given for hyperkalemia.   10/21: BD 21. POD0 PEG placement with Gen surg. decr labetolol to 50q8, incr. cardura to 0.4, started lokelma and phoslo, dc gabriel POD0 PEG placement with Gen surg.  10/22: BD 22. Plan to start TF today via PEG. dc labetalol, Following ophtho recs. Increased apnea settings - found to be in cheyne-moe respiration. CPAP 5/5.  10/23: BD 23. hydralazine d/c'd, trach collar trial today. Rectal tube placed at 6am.  10/24: BD24, o/n lokelma held due to diarrhea. Free water 100q6 resumed. dc'd tamsulosin, amantadine. Incr'd cardura to 8mg qhs. Dc'd FW. Switched jevity to nepro. gabriel placed for high urine output. Started SL atropine for oral secretions. Dc'd free water.  10/25: BD25, o/n decreased suctioning requirements to > q4hrs, GEORGE. Cr improving, cont phoslo, lokelma held at this time. Gabriel placed yest, cont. Tolerating trach collar. Given 500cc plasmalyte bolus for ANIKA. Dc'd sinemet.   10/26: BD26, o/n resumed lokelma 5mg daily and resumed 100cc free water q6hrs. Change in neuro status with new right pupillary dilation with anisocoria (right pupil 6mm fixed and left pupil 3mm briskly reactive). Given 23.4% NaCl bullet, taken for emergent CTH showing mostly resolved pontine hemorrhage, continued brainstem hypodensity likely edema d/t hemorrhage, no new hemorrhage or infarct, no herniation, mild increase in size of left lateral ventricle. Vitals remaine stable. Na goal > 140.   10/27: BD27, o/n GEORGE.Neuro stable. Pend stepdown with airway bed.   10/28: BD 28. GEORGE overnight. Neuro stable. Miralax ordered. Gabrile removed, pending TOV.  10/29: BD 29. GEORGE o/n. Given 2L NS over 8 hrs for increased BUN/Cr ratio. Gabriel placed for frequent straight cath.   10/30: BD 30.   10/31: BD 31. GEORGE overnight. Na 149, increased free water to 200q6. 1L NS for uptrending BUN.   11/1: BD 32. GEORGE overnight. Given 1L NS for dehydration. Na 146, increased FW to 250q6.   11/2: BD 33 GEORGE overnight, neuro stable, given 500cc bolus for net negative status and tachycardia   11/3: BD 34, GEORGE overnight, neuro stable. Patient remains tachycardic, EKG showing sinus tachycardia, given additional 500cc NS bolus. Febrile to 101.9F, pan cultured (without UA), CXR WNL, given tylenol.   11/4: BD 35, GEORGE overnight, neuro stable. Given 1L NS for tachycardia. sputum (+) for stenotropohomas maltophilia.   11/5: BD 36 GEORGE overnight, neuro stable. Vancomycin dc'd. Chest PT BID. ID consulted, cont zosyn.  11/6: BD 37. blood cx + klebsiella dc zosyn changed to cefepime, CTAP ordered, rpt blood cx sent.    11/7: BD 38. Pending CT A/P, given 250cc bolus and starting maintenance fluids overnight. Pending CT A/P after bolus   11/8: BD 39. CT CAP negative for infection.   11/9: BD 40. GEORGE overnight.  11/10: BD 41. GEORGE overnight. desat to 85 on trach collar, O2 inc to 10L and 100%, O2 sat inc to 95. pt tachy to 110s, euvolemic. given tylenol. ABG and CXR ordered. spiked fever, pancultured, RVP negative. AM ABG w pO2 79, rpt w pO2 79. pt appears comfortable, satting 94%.   11/11: BD 42. GEORGE overnight. pt became tachy to 130s, desat to 90 on 100% FiO2 and 10L. suctioned, (+) productive cough. temp 101.4, given 1g IV tylenol and 500cc NS bolus for euvolemia. fever and HR downtrending. LE dopplers negative for dvt  11/12: BD 43, GEORGE ovn, fever and HR downtrending, satting 97% 70% FIO2  11/13: BD 44, GEORGE ovn    Vital Signs Last 24 Hrs  T(C): 36.5 (12 Nov 2024 20:10), Max: 36.5 (12 Nov 2024 20:10)  T(F): 97.7 (12 Nov 2024 20:10), Max: 97.7 (12 Nov 2024 20:10)  HR: 82 (12 Nov 2024 20:10) (71 - 85)  BP: 117/84 (12 Nov 2024 20:10) (117/84 - 133/86)  BP(mean): --  RR: 17 (12 Nov 2024 20:10) (16 - 18)  SpO2: 100% (12 Nov 2024 20:10) (97% - 100%)    Parameters below as of 12 Nov 2024 20:20  Patient On (Oxygen Delivery Method): tracheostomy collar        I&O's Summary    11 Nov 2024 07:01  -  12 Nov 2024 07:00  --------------------------------------------------------  IN: 540 mL / OUT: 2100 mL / NET: -1560 mL    12 Nov 2024 07:01  -  13 Nov 2024 00:30  --------------------------------------------------------  IN: 0 mL / OUT: 650 mL / NET: -650 mL        PHYSICAL EXAM:  General: NAD, pt is sitting up in bed, on trach collar to 35% FiO2  HEENT: left pupil 4mm reactive, right pupil 3mm briskly reactive; EOMI vertically b/l  Cardiovascular: RRR, S1, S2  Respiratory: breathing non-labored on trach collar, chest rise symmetric  GI: abd soft, NTND   Neuro: A&Ox0, intermittently follows commands.  RUE trace wiggles fingers to command, RUE 2/5 bi/tri, RLE briskly w/d to noxious; LUE 0/5, LLE w/d to noxious.   Unable to assess sensation secondary to mental status   Extremities: distal pulses 2+ x4  Wound/incision: n/a  Drain: (+) trach (+) PEG (+) gabriel    LABS:                        12.8   10.72 )-----------( 193      ( 12 Nov 2024 10:22 )             41.3     11-12    139  |  108  |  75[H]  ----------------------------<  112[H]  4.3   |  22  |  2.18[H]    Ca    9.4      12 Nov 2024 17:07  Phos  5.4     11-12  Mg     2.8     11-12        Urinalysis Basic - ( 12 Nov 2024 17:07 )    Color: x / Appearance: x / SG: x / pH: x  Gluc: 112 mg/dL / Ketone: x  / Bili: x / Urobili: x   Blood: x / Protein: x / Nitrite: x   Leuk Esterase: x / RBC: x / WBC x   Sq Epi: x / Non Sq Epi: x / Bacteria: x          CAPILLARY BLOOD GLUCOSE          Drug Levels: [] N/A    CSF Analysis: [] N/A      Allergies    Allergy Status Unknown    Intolerances        Home Medications:      MEDICATIONS:  MEDICATIONS  (STANDING):  acetylcysteine 10%  Inhalation 4 milliLiter(s) Inhalation every 6 hours  albuterol/ipratropium for Nebulization 3 milliLiter(s) Nebulizer every 6 hours  amLODIPine   Tablet 10 milliGRAM(s) Oral daily  artificial tears (preservative free) Ophthalmic Solution 1 Drop(s) Right EYE every 4 hours  calcium acetate 667 milliGRAM(s) Oral three times a day with meals  doxazosin 8 milliGRAM(s) Oral at bedtime  erythromycin   Ointment 1 Application(s) Right EYE every 6 hours  heparin   Injectable 5000 Unit(s) SubCutaneous every 8 hours  ofloxacin 0.3% Solution 1 Drop(s) Right EYE every 6 hours  petrolatum Ophthalmic Ointment 1 Application(s) Both EYES two times a day  polyethylene glycol 3350 17 Gram(s) Oral two times a day  sodium zirconium cyclosilicate 5 Gram(s) Oral daily    MEDICATIONS  (PRN):  acetaminophen   Oral Liquid .. 650 milliGRAM(s) Oral every 6 hours PRN Temp greater or equal to 38C (100.4F), Mild Pain (1 - 3)      CULTURES:  Culture Results:   Commensal iram consistent with body site (11-11 @ 05:06)  Culture Results:   No growth (11-10 @ 17:21)      RADIOLOGY & ADDITIONAL TESTS:      ASSESSMENT:  45 y/o PMH ?stroke/MI present to [facility] after collapsing at work. Decorticate posturing, vomiting, intubated for airway protection. 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 Transferred to [facility] for further management. s/p trach 10/14. s/p peg 10/21.      PLAN:  Neuro:  - neuro/vitals q8  - pain control: Tylenol prn  - CTH 9/30: enlarged pontine hemorrhage, CTH 10/3: read stable, CTH 10/25 anisocoric pupils (R sluggish 6mm compared to left 3mm briskly reactive); showing mostly resolved pontine hemorrhage, continued brainstem hypodensity likely edema d/t hemorrhage, no new hemorrhage or infarct, no herniation, mild increase in size of left lateral ventricle  - vEEG (10/3-4)- negative, (10/17-10/19) - negative  - stroke core measures, stroke neuro signed off  - MRI brain w/ w/o contrast 10/12: parenchymal hemorrhage, acute/subacute R cerebellar stroke      CV:  - -160  - HTN: amlodipine 10 mg qd  - echo (9/30) EF 75%    Resp:  - tolerating trach collar  - duonebs/mucomyst q6h  - chest PT vest BID    GI:  - Nepro TF via PEG (placed 10/21 by gen surg)  - bowel regimen, last BM 11/11  - CTAP 11/8 negative for infection      Renal:  - gabriel placed 10/29 d/t urinary retention - replaced 11/7  - FW flushes 250cc q6hrs   - hyperK 10/20: lokelma 5mg qd  - hyperPhos: phoslo 667mg TID   - Urinary retenion: Cardura 8 mg   - CKD: trend BUN/Cr  - Na goal 135-145  - renal US 10/1: echogenicity c/w chronic med renal dz, repeat 10/8: inc renal echogeicity, c/f medical renal disease w increased hydro     Endo:  - A1c 5.4    Heme:  - DVT ppx: SCDs, SQH 5000u q8h   - LE dopplers negative 11/11    ID:  - febrile, last pancx 11/10  - pan cultured 11/3 (w/out UA) , (+) sputum for stenotrophomas maltophlia, blood cx (+) klebsiella, cefepime 2gq12 (11/6 - )   - ID following  - empiric tx: s/p zosyn (11/3-11/6) , s/p vanc  (11/3- 11/5)  - S/p Ancef (10/4-10/14) for PNA, and s/p Unasyn (10/18-10/23) +actinobacter baumanii    MISC:  - ophtho consult for keratitis, rec erythromycin ointment to rt eye q4hrs, ofloxacin ointment to rt eye QID, artificial tears to rt eye q2hrs, moisture chamber at bedtime    Dispo: regional, full code, pending placement and emergency medicaid     D/w Dr. D'Amico     Assessment: present when checked     [] GCS   E   V   M     Heart Failure: [] Acute, [] acute on chronic, [] chronic   Heart Failure: [] Diastolic (HFpEF), [] Systolic (HRrEF), [] Combined (HFpEF and HFrEF), [] RHF, [] Pulm HTN, [] Other     [] ANIKA, [] ATN, [] AIN, [] other   [] CKD1, [] CKD2, [] CKD3, [] CKD4, [] CKD5, [] ESRD     Encephalopathy: [] Metabolic, [] Hepatic, [] Toxic, [] Neurological, [] Other     Abnormal Nutritional Status: [] malnutrition (see nutrition note), []underweight: BMI <19, [] morbid obesity: BMI >40, [] Cachexia     [] Sepsis   [] Hypovolemic shock, [] Cardiogenic shock, [] Hemorrhagic shock, [] Neurogenic shock   [] Acute respiratory failure   [] Cerebral edema, [] Brain compression / herniation   [] Functional quadriplegia   [] Acute blood loss anemia

## 2024-11-13 NOTE — PROGRESS NOTE ADULT - ASSESSMENT
44 YOF with unclear PMH (?stroke, MI) who was found down at work, intubated for airway protection and found to have acute parenchymal hemorrhage within nabil with mass effect (+ acute/subacute right cerebellar infarct) in setting of hypertensive emergency, transferred to [facility] for further neurosurgical care. Hospital course c/b poor neurologic recovery s/p trach-PEG, AUR s/p gabriel, ANIKA on CKD c/b hyperkalemia, HAP s/p amp-sulbactam (EOT 10/23), K aerogenes bacteremia.       #Gram-negative bacteremia.   #Fever ? LLL pneumonia   -Pt's blood cx on 11/3 positive K aerogenes  - Source of bacteremia suspected  however there is no urine cx data given chronic gabriel (s/p exchanged 11/7)  -Pt's surveillance blood cx sent on 11/6 was NGTD   -Spoke to ID  who recommended continuing  cefepime 2g q12 for now pending blood cx and urine cx   -F/u Sputum Cx   - Check CTA of the Chest to R/o PE   - since lung exam is clear to auscultation  and secretions are thin and minimal , hold Mucomyst nebs , and change Duonebs to PRN     # Pontine hemorrhage.   -The pt was found to have acute parenchymal hemorrhage within nabil with mass effect (+ acute/subacute right cerebellar infarct) in setting of hypertensive emergency.   -The pt had an MRI brain also demonstrated acute/subacute R cerebellar stroke.   - Will continue management per NSGY  -Will continue pain management and bowel regimen as per NSGY team     # Hypertension  -Will continue amlodipine 10mg daily     #Acute kidney injury superimposed on CKD.   -Pt s/p US renal with chronic medical renal disease. Hospital course c/b ATN.   -Will continue to renally dose medications and avoid nephrotoxins  - Will continue to monitor creatinine downtrending  Creatinine 2.52->2.47     #Hyperkalemia   - Pt had hyperkalemia now on lokelma 5g qd with improvement; Pt's K was hemolyzed on today's labs will repeat.     #Hyperphosphatemia   -Pt  had hyperphosphatemia now on phoslo 776 mg TID now within normal limits will continue to monitor.     # Chronic respiratory failure.   -Pt s/p percutaneous trach by pulm on 10/14/24  - Will continue  trach collar   - Will continue routine trach care and suctioning PRN  - Will continue duo-neb with mucomyst q6hr standing     # Neurogenic dysphagia.   -Pt s/pPEG with surgery 10/21/24  - TF per nutrition  - aspiration precautions and elevation of HOB.    #Acute urinary retention.   -Pt s/p gabriel placement  10/24 for urinary retention, failed TOV 10/27.   - doxazosin 8mg.    # Functional quadriplegia in  setting of brainstem hemorrhage  - decub precautions.    #DVT prop  -Continue Heparin SQ q8     #DISPO  -TBD - pending PRUCOL application    55 minutes spent on total encounter. The necessity of the time spent during the encounter on this date of service was due to:    Review of hospital course, labs, vitals, medical records.  Bedside exam   Discussed plan of care with primary team   Documenting the encounter.

## 2024-11-14 LAB
ANION GAP SERPL CALC-SCNC: 13 MMOL/L — SIGNIFICANT CHANGE UP (ref 5–17)
BUN SERPL-MCNC: 78 MG/DL — HIGH (ref 7–23)
CALCIUM SERPL-MCNC: 9.4 MG/DL — SIGNIFICANT CHANGE UP (ref 8.4–10.5)
CHLORIDE SERPL-SCNC: 108 MMOL/L — SIGNIFICANT CHANGE UP (ref 96–108)
CO2 SERPL-SCNC: 19 MMOL/L — LOW (ref 22–31)
CREAT SERPL-MCNC: 2.55 MG/DL — HIGH (ref 0.5–1.3)
EGFR: 31 ML/MIN/1.73M2 — LOW
EGFR: 31 ML/MIN/1.73M2 — LOW
GLUCOSE SERPL-MCNC: 107 MG/DL — HIGH (ref 70–99)
HCT VFR BLD CALC: 36.1 % — LOW (ref 39–50)
HGB BLD-MCNC: 11.4 G/DL — LOW (ref 13–17)
MAGNESIUM SERPL-MCNC: 2.6 MG/DL — SIGNIFICANT CHANGE UP (ref 1.6–2.6)
MCHC RBC-ENTMCNC: 29.7 PG — SIGNIFICANT CHANGE UP (ref 27–34)
MCHC RBC-ENTMCNC: 31.6 G/DL — LOW (ref 32–36)
MCV RBC AUTO: 94 FL — SIGNIFICANT CHANGE UP (ref 80–100)
NRBC # BLD: 0 /100 WBCS — SIGNIFICANT CHANGE UP (ref 0–0)
NRBC BLD-RTO: 0 /100 WBCS — SIGNIFICANT CHANGE UP (ref 0–0)
PHOSPHATE SERPL-MCNC: 3.1 MG/DL — SIGNIFICANT CHANGE UP (ref 2.5–4.5)
PLATELET # BLD AUTO: 209 K/UL — SIGNIFICANT CHANGE UP (ref 150–400)
POTASSIUM SERPL-MCNC: 4.5 MMOL/L — SIGNIFICANT CHANGE UP (ref 3.5–5.3)
POTASSIUM SERPL-SCNC: 4.5 MMOL/L — SIGNIFICANT CHANGE UP (ref 3.5–5.3)
RBC # BLD: 3.84 M/UL — LOW (ref 4.2–5.8)
RBC # FLD: 14.6 % — HIGH (ref 10.3–14.5)
SODIUM SERPL-SCNC: 140 MMOL/L — SIGNIFICANT CHANGE UP (ref 135–145)
WBC # BLD: 7.68 K/UL — SIGNIFICANT CHANGE UP (ref 3.8–10.5)
WBC # FLD AUTO: 7.68 K/UL — SIGNIFICANT CHANGE UP (ref 3.8–10.5)

## 2024-11-14 PROCEDURE — 99233 SBSQ HOSP IP/OBS HIGH 50: CPT

## 2024-11-14 PROCEDURE — 99232 SBSQ HOSP IP/OBS MODERATE 35: CPT

## 2024-11-14 RX ORDER — ACETAMINOPHEN 500 MG/5ML
650 LIQUID (ML) ORAL EVERY 6 HOURS
Refills: 0 | Status: DISCONTINUED | OUTPATIENT
Start: 2024-11-14 | End: 2024-11-17

## 2024-11-14 RX ADMIN — Medication 650 MILLIGRAM(S): at 05:38

## 2024-11-14 RX ADMIN — Medication 1 APPLICATION(S): at 06:03

## 2024-11-14 RX ADMIN — Medication 1 DROP(S): at 09:20

## 2024-11-14 RX ADMIN — DOXAZOSIN MESYLATE 8 MILLIGRAM(S): 8 TABLET ORAL at 22:23

## 2024-11-14 RX ADMIN — ERYTHROMYCIN 1 APPLICATION(S): 5 OINTMENT OPHTHALMIC at 00:29

## 2024-11-14 RX ADMIN — AMLODIPINE BESYLATE 10 MILLIGRAM(S): 10 TABLET ORAL at 05:39

## 2024-11-14 RX ADMIN — Medication 80 MILLILITER(S): at 13:01

## 2024-11-14 RX ADMIN — OFLOXACIN 1 DROP(S): 3 SOLUTION OPHTHALMIC at 17:09

## 2024-11-14 RX ADMIN — Medication 1 DROP(S): at 17:08

## 2024-11-14 RX ADMIN — SODIUM ZIRCONIUM CYCLOSILICATE 5 GRAM(S): 5 POWDER, FOR SUSPENSION ORAL at 13:03

## 2024-11-14 RX ADMIN — HEPARIN SODIUM 5000 UNIT(S): 1000 INJECTION INTRAVENOUS; SUBCUTANEOUS at 22:22

## 2024-11-14 RX ADMIN — OFLOXACIN 1 DROP(S): 3 SOLUTION OPHTHALMIC at 00:30

## 2024-11-14 RX ADMIN — POLYETHYLENE GLYCOL 3350 17 GRAM(S): 17 POWDER, FOR SOLUTION ORAL at 17:08

## 2024-11-14 RX ADMIN — Medication 667 MILLIGRAM(S): at 17:08

## 2024-11-14 RX ADMIN — Medication 1 DROP(S): at 22:23

## 2024-11-14 RX ADMIN — OFLOXACIN 1 DROP(S): 3 SOLUTION OPHTHALMIC at 05:39

## 2024-11-14 RX ADMIN — OFLOXACIN 1 DROP(S): 3 SOLUTION OPHTHALMIC at 13:02

## 2024-11-14 RX ADMIN — Medication 667 MILLIGRAM(S): at 09:20

## 2024-11-14 RX ADMIN — ERYTHROMYCIN 1 APPLICATION(S): 5 OINTMENT OPHTHALMIC at 17:09

## 2024-11-14 RX ADMIN — Medication 1 DROP(S): at 02:59

## 2024-11-14 RX ADMIN — ERYTHROMYCIN 1 APPLICATION(S): 5 OINTMENT OPHTHALMIC at 05:39

## 2024-11-14 RX ADMIN — Medication 1 DROP(S): at 06:02

## 2024-11-14 RX ADMIN — ERYTHROMYCIN 1 APPLICATION(S): 5 OINTMENT OPHTHALMIC at 13:01

## 2024-11-14 RX ADMIN — Medication 1 APPLICATION(S): at 17:08

## 2024-11-14 RX ADMIN — HEPARIN SODIUM 5000 UNIT(S): 1000 INJECTION INTRAVENOUS; SUBCUTANEOUS at 05:39

## 2024-11-14 RX ADMIN — Medication 1 DROP(S): at 13:01

## 2024-11-14 RX ADMIN — POLYETHYLENE GLYCOL 3350 17 GRAM(S): 17 POWDER, FOR SOLUTION ORAL at 05:38

## 2024-11-14 RX ADMIN — Medication 650 MILLIGRAM(S): at 14:01

## 2024-11-14 RX ADMIN — HEPARIN SODIUM 5000 UNIT(S): 1000 INJECTION INTRAVENOUS; SUBCUTANEOUS at 13:04

## 2024-11-14 RX ADMIN — Medication 667 MILLIGRAM(S): at 13:01

## 2024-11-14 RX ADMIN — Medication 650 MILLIGRAM(S): at 13:01

## 2024-11-14 NOTE — PROGRESS NOTE ADULT - SUBJECTIVE AND OBJECTIVE BOX
Patient was seen and examined at bedside. Case discuss with the primary team.    no further fever , discussed with Nursing secretions have improved and frequency of suctioning is better than past 2 days       OBJECTIVE:  Vital Signs Last 24 Hrs  T(C): 37.3 (14 Nov 2024 13:51), Max: 37.4 (14 Nov 2024 05:10)  T(F): 99.2 (14 Nov 2024 13:51), Max: 99.4 (14 Nov 2024 05:10)  HR: 96 (14 Nov 2024 13:51) (96 - 106)  BP: 146/87 (14 Nov 2024 13:51) (124/80 - 146/87)  BP(mean): --  RR: 17 (14 Nov 2024 13:51) (16 - 20)  SpO2: 92% (14 Nov 2024 13:51) (92% - 98%)    Parameters below as of 14 Nov 2024 13:51  Patient On (Oxygen Delivery Method): tracheostomy collar            PHYSICAL EXAM:  Gen: resting comfortably, NAD  HEENT: NCAT, MMM  Neck: trach with minimal secretions   CV: RRR, no m/r/g, peripheral pulses 2+ , tachycardia   Pulm: CTAB, no increased work of breathing, no rales/rhonchi , tachypnea   Abd: soft, ND, NT, PEG  Neuro: AAOx 2     LABS:                                                                    11.4   7.68  )-----------( 209      ( 14 Nov 2024 07:39 )             36.1     11-14    140  |  108  |  78[H]  ----------------------------<  107[H]  4.5   |  19[L]  |  2.55[H]    Ca    9.4      14 Nov 2024 07:39  Phos  3.1     11-14  Mg     2.6     11-14                   CXR:  Limited inspiration.     EKG  Sinus tach @105bpm     MEDICATIONS  (STANDING):  amLODIPine   Tablet 10 milliGRAM(s) Oral daily  artificial tears (preservative free) Ophthalmic Solution 1 Drop(s) Right EYE every 4 hours  calcium acetate 667 milliGRAM(s) Oral three times a day with meals  doxazosin 8 milliGRAM(s) Oral at bedtime  erythromycin   Ointment 1 Application(s) Right EYE every 6 hours  heparin   Injectable 5000 Unit(s) SubCutaneous every 8 hours  ofloxacin 0.3% Solution 1 Drop(s) Right EYE every 6 hours  petrolatum Ophthalmic Ointment 1 Application(s) Both EYES two times a day  polyethylene glycol 3350 17 Gram(s) Oral two times a day  sodium chloride 0.9%. 1000 milliLiter(s) (80 mL/Hr) IV Continuous <Continuous>  sodium zirconium cyclosilicate 5 Gram(s) Oral daily    MEDICATIONS  (PRN):  acetaminophen   Oral Liquid .. 650 milliGRAM(s) Oral every 6 hours PRN Temp greater or equal to 38C (100.4F), Mild Pain (1 - 3)  albuterol/ipratropium for Nebulization 3 milliLiter(s) Nebulizer every 6 hours PRN Shortness of Breath and/or Wheezing      MEDICATIONS  (PRN):

## 2024-11-14 NOTE — PROGRESS NOTE ADULT - SUBJECTIVE AND OBJECTIVE BOX
HPI:  Unknown age male, unknown past medical history (reported stroke and MI by coworkers) presented to [facility] with AMS, Pt was working at [employer] when he was found down by coworkers. EMS called and pt brought to [facility] ED. Intubated, sedated, started on cardene for SBPs in 200s. CT head showed brain stem bleed. Transferred to NSICU for further management.  (30 Sep 2024 12:55)      HOSPITAL COURSE:  9/30: transferred from [facility]. A line placed. Versed dc'd. Cy Rader at bedside, states pt has family in [place], cannot confirm medications or PMH other than stroke and MI. 250cc bolus 3% given. LR switched to NS. hydralazine 25q8 started, 3% started, switched propofol to precedex   10/1: stability CTH done. Added labetalol, started TF. Palliative consulted. ethics consulted to determine surrogate. febrile 103, pan cx sent  10/2: BD 2, GEOREG overnight. TF resumed. Desatt'd to 80s, FiO2 inc. to 50. Fentanyl given, ABG, CXR ordered. Maxxed on precedex, started on propofol for DARIEN -4 - -5. Precedex dc'd. Duonebs, mucomyst, hypertonic added. 3% dc'd. Cardene dc'd. Start vanc/CTX. Increased labetalol 200q8. MRSA negative, dc'd vanc. ETT pulled back 2cm x 2, good positioning after confirmatory chest xray. Ethics attempting to establish HCP with family. Na 159, starting FW 250q6 for range 150-155.   10/3: BD3, GEORGE o/n, neuro stable. Na elevating, FW increased to 300q6. Dc'd bowel reg for diarrhea. vEEG started. SQH 5000q8 tonight.   10/4: BD 4, albumn bolus, incr. LR to 80 2/2 incr. in Cr, LR to 100cc/hr for uptrending Cr. Started 7% hypersal for 48hrs and SL atropine for inline/oral thick secretions. Dc'd CTX and started ancef for MSSA in the sputum. Nephrology consulted for CKD, f/u recs. SBP 170s, given hydralazine 10mg IVP.   10/5: BD5, o/n 10mg IVP hydralazine given for SBP 170s and started on hydralazine 25q8 via OGT. 10mg IV push labetalol for SBP > 160s. RT placed for diarrhea.   10/6: BD6, o/n FW increased to 350q4 per nephrology recs. IV tylenol for temp 100.6, SBp 160s presumed uncomfortable.   10/7: BD7, overnight pancultured for temp 101.8F.   10/8: BD8. GEORGE. Cr bumped. decreased LR to 75cc/hr. Adding simethicone ATC. incr hydralazine 50mgTID. Incr labetalol 300mgTID. Na 145, decreased FWF to 250q6. Start precedex. FENa consistent with intrinsic kidney injury. Pend repeat renal US. Retaining up to 1.3L, bladder scans q6, straight cath PRN  10/9: BD 9. GEORGE overnight. Neuro stable. abd xray for distention w non-specific gas pattern, OGT to LIWS for morning. duonebs/mucomyst to q8 for improving secretions. Changed tube feeds to Jevity 1.5 20cc/hr, low rate due to abdominal distention, nepro dense and more difficult to digest. Tolerating CPAP, confirmed by ABG.   10/10: BD 10. GEORGE overnight. Neuro stable. (+) gabriel for urinary retention on bladder scan. inc TF to goal rate of 40cc/hr. family leaning toward pursuing trach/PEG. 1/2 amp for FS 81.   10/11: BD 11. GEORGE overnight. Neuro stable. Trach/PEG consults placed.   10/12: BD 12. GEORGE overnight. Neuro stable. MRI brain complete.   10/13: BD 13. Increase flomax. Hold SQH after PM dose for trach tm. IVL.   10/14: BD 14. GEORGE overnight, remains on AC/VC. Gabriel placed for urinary retention. Dc'd free water.  S/p trach with pulm. NGT placed and CXR confirmed in good position.   10/15: BD 15, GEORGE ovn. resumed feeds. spiked 101, pan cx sent.   10/16: BD 16. GEORGE ovn. Lokelma 5mg for K+ 5.4. Started vanc q 24/zosyn for empiric PNA coverage, IVF to 100/hr. PEG held for fever.   10/17: BD 17,  ordered serum osm and urine osm for am. Started sinemet for neurostimulation. Increased cardura to 0.8. Started FW 100q4, dc'd IVF. MRSA negative, dc'd vanc. NGT replaced d/t coiling.   10/18: BD 18, GEORGE overnight, neuro stable. Amantadine added for neurostim. zosyn changed to unasyn for acinetobacter baumannii, failed TOV and required SC  10/19: BD 19, GEORGE ovn. cardura 2mg added for retention. labetalol decreased 200q8, hydralazine decreased 25q8. Gabriel replaced.   10/20: BD20, GEORGE overnight. NGT dislodged, replaced. PEG tomorrow w/ gen surg, FW increased to 150q4 and labetalol decreased to 100q8, lokelma given for hyperkalemia.   10/21: BD 21. POD0 PEG placement with Gen surg. decr labetolol to 50q8, incr. cardura to 0.4, started lokelma and phoslo, dc gabriel POD0 PEG placement with Gen surg.  10/22: BD 22. Plan to start TF today via PEG. dc labetalol, Following ophtho recs. Increased apnea settings - found to be in cheyne-moe respiration. CPAP 5/5.  10/23: BD 23. hydralazine d/c'd, trach collar trial today. Rectal tube placed at 6am.  10/24: BD24, o/n lokelma held due to diarrhea. Free water 100q6 resumed. dc'd tamsulosin, amantadine. Incr'd cardura to 8mg qhs. Dc'd FW. Switched jevity to nepro. gabriel placed for high urine output. Started SL atropine for oral secretions. Dc'd free water.  10/25: BD25, o/n decreased suctioning requirements to > q4hrs, GEORGE. Cr improving, cont phoslo, lokelma held at this time. Gabriel placed yest, cont. Tolerating trach collar. Given 500cc plasmalyte bolus for ANIKA. Dc'd sinemet.   10/26: BD26, o/n resumed lokelma 5mg daily and resumed 100cc free water q6hrs. Change in neuro status with new right pupillary dilation with anisocoria (right pupil 6mm fixed and left pupil 3mm briskly reactive). Given 23.4% NaCl bullet, taken for emergent CTH showing mostly resolved pontine hemorrhage, continued brainstem hypodensity likely edema d/t hemorrhage, no new hemorrhage or infarct, no herniation, mild increase in size of left lateral ventricle. Vitals remaine stable. Na goal > 140.   10/27: BD27, o/n GEORGE.Neuro stable. Pend stepdown with airway bed.   10/28: BD 28. GEORGE overnight. Neuro stable. Miralax ordered. Gabriel removed, pending TOV.  10/29: BD 29. GEORGE o/n. Given 2L NS over 8 hrs for increased BUN/Cr ratio. Gabriel placed for frequent straight cath.   10/30: BD 30.   10/31: BD 31. GEORGE overnight. Na 149, increased free water to 200q6. 1L NS for uptrending BUN.   11/1: BD 32. GEORGE overnight. Given 1L NS for dehydration. Na 146, increased FW to 250q6.   11/2: BD 33 GEORGE overnight, neuro stable, given 500cc bolus for net negative status and tachycardia   11/3: BD 34, GEORGE overnight, neuro stable. Patient remains tachycardic, EKG showing sinus tachycardia, given additional 500cc NS bolus. Febrile to 101.9F, pan cultured (without UA), CXR WNL, given tylenol.   11/4: BD 35, GEORGE overnight, neuro stable. Given 1L NS for tachycardia. sputum (+) for stenotropohomas maltophilia.   11/5: BD 36 GEORGE overnight, neuro stable. Vancomycin dc'd. Chest PT BID. ID consulted, cont zosyn.  11/6: BD 37. blood cx + klebsiella dc zosyn changed to cefepime, CTAP ordered, rpt blood cx sent.    11/7: BD 38. Pending CT A/P, given 250cc bolus and starting maintenance fluids overnight. Pending CT A/P after bolus   11/8: BD 39. CT CAP negative for infection.   11/9: BD 40. GEORGE overnight.  11/10: BD 41. GEORGE overnight. desat to 85 on trach collar, O2 inc to 10L and 100%, O2 sat inc to 95. pt tachy to 110s, euvolemic. given tylenol. ABG and CXR ordered. spiked fever, pancultured, RVP negative. AM ABG w pO2 79, rpt w pO2 79. pt appears comfortable, satting 94%.   11/11: BD 42. GEORGE overnight. pt became tachy to 130s, desat to 90 on 100% FiO2 and 10L. suctioned, (+) productive cough. temp 101.4, given 1g IV tylenol and 500cc NS bolus for euvolemia. fever and HR downtrending. LE dopplers negative for dvt  11/12: BD 43, GEORGE ovn, fever and HR downtrending, satting 97% 70% FIO2  11/13: BD 44, GEORGE ovn. started standing tylenol x24 hours for tachycardia. desat to 80s, o2 increased. CXR stable, pending CTA PE protocol.   11/14: BD 45, GEORGE overnight, neuro stable     OVERNIGHT EVENTS: Pt seen and examined in bed, unable to offer any acute complaints at this time    Vital Signs Last 24 Hrs  T(C): 36.8 (13 Nov 2024 20:17), Max: 37.3 (13 Nov 2024 13:24)  T(F): 98.3 (13 Nov 2024 20:17), Max: 99.2 (13 Nov 2024 13:24)  HR: 98 (13 Nov 2024 23:21) (96 - 119)  BP: 127/86 (13 Nov 2024 20:17) (109/77 - 147/83)  BP(mean): 95 (13 Nov 2024 15:31) (95 - 95)  RR: 16 (13 Nov 2024 23:21) (16 - 20)  SpO2: 98% (13 Nov 2024 23:21) (92% - 100%)    Parameters below as of 13 Nov 2024 23:21  Patient On (Oxygen Delivery Method): tracheostomy collar    O2 Concentration (%): 100    I&O's Summary    12 Nov 2024 07:01  -  13 Nov 2024 07:00  --------------------------------------------------------  IN: 480 mL / OUT: 1375 mL / NET: -895 mL    13 Nov 2024 07:01  -  14 Nov 2024 00:51  --------------------------------------------------------  IN: 1320 mL / OUT: 1150 mL / NET: 170 mL        PHYSICAL EXAM:  Constitutional: Pt found in bed in NAD   ENT: PERRL, vertical EOMi, R 3mm brisk, L 4mm sluggish   Respiratory: + trach, breathing non-labored, symmetrical chest wall movement  Cardiovascuar: RRR, no murmurs  Gastrointestinal: +PEG, abdomen soft, non tender  Neurological:  AAOX0. wiggles RUE fingers to command  Motor: RUE  HG 3/5 wiggles fingers, RLE wiggles toes, LUE 0/5, LLE 0/5  Pronator Drift: unable to assess   Dysmetria: unable to assess   Wounds/Drains: N/A    TUBES/LINES:  [x] Gabriel  [] Lumbar Drain  [] Wound Drains  [] Others      DIET:  [] NPO  [] Mechanical  [x] Tube feeds    LABS:                        12.8   10.72 )-----------( 193      ( 12 Nov 2024 10:22 )             41.3     11-12    139  |  108  |  75[H]  ----------------------------<  112[H]  4.3   |  22  |  2.18[H]    Ca    9.4      12 Nov 2024 17:07  Phos  5.4     11-12  Mg     2.8     11-12        Urinalysis Basic - ( 12 Nov 2024 17:07 )    Color: x / Appearance: x / SG: x / pH: x  Gluc: 112 mg/dL / Ketone: x  / Bili: x / Urobili: x   Blood: x / Protein: x / Nitrite: x   Leuk Esterase: x / RBC: x / WBC x   Sq Epi: x / Non Sq Epi: x / Bacteria: x          CAPILLARY BLOOD GLUCOSE          Drug Levels: [] N/A    CSF Analysis: [] N/A      Allergies    Allergy Status Unknown    Intolerances      MEDICATIONS:  Antibiotics:    Neuro:  acetaminophen   Oral Liquid .. 650 milliGRAM(s) Oral every 8 hours    Anticoagulation:  heparin   Injectable 5000 Unit(s) SubCutaneous every 8 hours    OTHER:  albuterol/ipratropium for Nebulization 3 milliLiter(s) Nebulizer every 6 hours PRN  amLODIPine   Tablet 10 milliGRAM(s) Oral daily  artificial tears (preservative free) Ophthalmic Solution 1 Drop(s) Right EYE every 4 hours  doxazosin 8 milliGRAM(s) Oral at bedtime  erythromycin   Ointment 1 Application(s) Right EYE every 6 hours  ofloxacin 0.3% Solution 1 Drop(s) Right EYE every 6 hours  petrolatum Ophthalmic Ointment 1 Application(s) Both EYES two times a day  polyethylene glycol 3350 17 Gram(s) Oral two times a day  sodium zirconium cyclosilicate 5 Gram(s) Oral daily    IVF:  calcium acetate 667 milliGRAM(s) Oral three times a day with meals  sodium chloride 0.9%. 1000 milliLiter(s) IV Continuous <Continuous>    CULTURES:  Culture Results:   Commensal iram consistent with body site (11-11 @ 05:06)  Culture Results:   No growth (11-10 @ 17:21)    RADIOLOGY & ADDITIONAL TESTS:      ASSESSMENT:  45 y/o PMH ?stroke/MI present to [facility] after collapsing at work. Decorticate posturing, vomiting, intubated for airway protection. 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 Transferred to [facility] for further management. s/p trach 10/14. s/p peg 10/21.      PLAN:  Neuro:  - neuro/vitals q8  - pain control: Tylenol q8 x24 hours  - CTH 9/30: enlarged pontine hemorrhage, CTH 10/3: read stable, CTH 10/25 anisocoric pupils (R sluggish 6mm compared to left 3mm briskly reactive); showing mostly resolved pontine hemorrhage, continued brainstem hypodensity likely edema d/t hemorrhage, no new hemorrhage or infarct, no herniation, mild increase in size of left lateral ventricle  - vEEG (10/3-4)- negative, (10/17-10/19) - negative  - stroke core measures, stroke neuro signed off  - MRI brain w/ w/o contrast 10/12: parenchymal hemorrhage, acute/subacute R cerebellar stroke      CV:  - -160  - HTN: amlodipine 10 mg qd  - echo (9/30) EF 75%    Resp:  - tolerating trach collar  - duonebs/mucomyst q6h  - chest PT vest BID  - CTA chest 11/13 negative for PE    GI:  - Nepro TF via PEG (placed 10/21 by gen surg)  - bowel regimen, last BM 11/11  - CTAP 11/8 negative for infection      Renal:  - gabriel placed 10/29 d/t urinary retention - replaced 11/7  - FW flushes 250cc q6hrs   - IVF s/p CTA  - hyperK 10/20: lokelma 5mg qd  - hyperPhos: phoslo 667mg TID   - Urinary retenion: Cardura 8 mg   - CKD: trend BUN/Cr  - Na goal 135-145  - renal US 10/1: echogenicity c/w chronic med renal dz, repeat 10/8: inc renal echogeicity, c/f medical renal disease w increased hydro     Endo:  - A1c 5.4    Heme:  - DVT ppx: SCDs, SQH 5000u q8h   - LE dopplers negative 11/11    ID:  - febrile, last pancx 11/10  - pan cultured 11/3 (w/out UA) , (+) sputum for stenotrophomas maltophlia, blood cx (+) klebsiella, cefepime 2gq12 (11/6 - 11/12)   - ID following  - empiric tx: s/p zosyn (11/3-11/6) , s/p vanc  (11/3- 11/5)  - S/p Ancef (10/4-10/14) for PNA, and s/p Unasyn (10/18-10/23) +actinobacter baumanii    MISC:  - ophtho consult for keratitis, rec erythromycin ointment to rt eye q4hrs, ofloxacin ointment to rt eye QID, artificial tears to rt eye q2hrs, moisture chamber at bedtime    Dispo: regional, full code, pending placement and emergency medicaid     D/w Dr. D'Amico

## 2024-11-14 NOTE — PROGRESS NOTE ADULT - ASSESSMENT
44 YOF with unclear PMH (?stroke, MI) who was found down at work, intubated for airway protection and found to have acute parenchymal hemorrhage within nabil with mass effect (+ acute/subacute right cerebellar infarct) in setting of hypertensive emergency, transferred to [facility] for further neurosurgical care. Hospital course c/b poor neurologic recovery s/p trach-PEG, AUR s/p gabriel, ANIKA on CKD c/b hyperkalemia, HAP s/p amp-sulbactam (EOT 10/23), K aerogenes bacteremia.       # Gram-negative bacteremia.   # Fever ? LLL pneumonia   - Pt's blood cx on 11/3 positive K aerogenes  - Source of bacteremia suspected  however there is no urine cx data given chronic gabriel (s/p exchanged 11/7)  - Pt's surveillance blood cx sent on 11/6 was NGTD   - PEr ID note from 11/7 at least 7 days of IV cefepime from 11/6-11/13    - Sputum cx - Common Resp Commensals   - CTA Negative for PE , However with Evidence of Thick Secretions in Right Bronchus and Trachea   - Elevate HOB to 45 Degrees , Check Residuals , If residuals are elevated will need to decrease rate of Tube feeds     # Pontine hemorrhage.   -The pt was found to have acute parenchymal hemorrhage within nabil with mass effect (+ acute/subacute right cerebellar infarct) in setting of hypertensive emergency.   -The pt had an MRI brain also demonstrated acute/subacute R cerebellar stroke.   - Will continue management per NSGY  -Will continue pain management and bowel regimen as per NSGY team     # Hypertension  -Will continue amlodipine 10mg daily     #Acute kidney injury superimposed on CKD.   -Pt s/p US renal with chronic medical renal disease. Hospital course c/b ATN.   -Will continue to renally dose medications and avoid nephrotoxins  - Will continue to monitor creatinine downtrending  Creatinine 2.52->2.47     #Hyperkalemia   - Pt had hyperkalemia now on lokelma 5g qd with improvement; Pt's K was hemolyzed on today's labs will repeat.     #Hyperphosphatemia   -Pt  had hyperphosphatemia now on phoslo 776 mg TID now within normal limits will continue to monitor.     # Chronic respiratory failure.   -Pt s/p percutaneous trach by pulm on 10/14/24  - Will continue  trach collar   - Will continue routine trach care and suctioning PRN  - Will continue duo-neb with mucomyst q6hr standing     # Neurogenic dysphagia.   -Pt s/pPEG with surgery 10/21/24  - TF per nutrition  - aspiration precautions and elevation of HOB.    #Acute urinary retention.   -Pt s/p gabriel placement  10/24 for urinary retention, failed TOV 10/27.   - doxazosin 8mg.    # Functional quadriplegia in  setting of brainstem hemorrhage  - decub precautions.    #DVT prop  -Continue Heparin SQ q8     #DISPO  -TBD - pending PRUCOL application    55 minutes spent on total encounter. The necessity of the time spent during the encounter on this date of service was due to:    Review of hospital course, labs, vitals, medical records.  Bedside exam   Discussed plan of care with primary team   Documenting the encounter.

## 2024-11-15 LAB
ALBUMIN SERPL ELPH-MCNC: 3.6 G/DL — SIGNIFICANT CHANGE UP (ref 3.3–5)
ALP SERPL-CCNC: 152 U/L — HIGH (ref 40–120)
ALT FLD-CCNC: 75 U/L — HIGH (ref 10–45)
ANION GAP SERPL CALC-SCNC: 14 MMOL/L — SIGNIFICANT CHANGE UP (ref 5–17)
ANION GAP SERPL CALC-SCNC: 9 MMOL/L — SIGNIFICANT CHANGE UP (ref 5–17)
APTT BLD: 31.2 SEC — SIGNIFICANT CHANGE UP (ref 24.5–35.6)
AST SERPL-CCNC: 40 U/L — SIGNIFICANT CHANGE UP (ref 10–40)
BASE EXCESS BLDA CALC-SCNC: -3.6 MMOL/L — LOW (ref -2–3)
BASE EXCESS BLDA CALC-SCNC: -3.8 MMOL/L — LOW (ref -2–3)
BASOPHILS # BLD AUTO: 0.07 K/UL — SIGNIFICANT CHANGE UP (ref 0–0.2)
BASOPHILS NFR BLD AUTO: 0.5 % — SIGNIFICANT CHANGE UP (ref 0–2)
BILIRUB SERPL-MCNC: 0.4 MG/DL — SIGNIFICANT CHANGE UP (ref 0.2–1.2)
BUN SERPL-MCNC: 63 MG/DL — HIGH (ref 7–23)
BUN SERPL-MCNC: 68 MG/DL — HIGH (ref 7–23)
CALCIUM SERPL-MCNC: 9.2 MG/DL — SIGNIFICANT CHANGE UP (ref 8.4–10.5)
CALCIUM SERPL-MCNC: 9.7 MG/DL — SIGNIFICANT CHANGE UP (ref 8.4–10.5)
CHLORIDE SERPL-SCNC: 108 MMOL/L — SIGNIFICANT CHANGE UP (ref 96–108)
CHLORIDE SERPL-SCNC: 108 MMOL/L — SIGNIFICANT CHANGE UP (ref 96–108)
CO2 BLDA-SCNC: 22 MMOL/L — SIGNIFICANT CHANGE UP (ref 19–24)
CO2 BLDA-SCNC: 22 MMOL/L — SIGNIFICANT CHANGE UP (ref 19–24)
CO2 SERPL-SCNC: 19 MMOL/L — LOW (ref 22–31)
CO2 SERPL-SCNC: 20 MMOL/L — LOW (ref 22–31)
CREAT SERPL-MCNC: 2.05 MG/DL — HIGH (ref 0.5–1.3)
CREAT SERPL-MCNC: 2.05 MG/DL — HIGH (ref 0.5–1.3)
EGFR: 40 ML/MIN/1.73M2 — LOW
EOSINOPHIL # BLD AUTO: 0.01 K/UL — SIGNIFICANT CHANGE UP (ref 0–0.5)
EOSINOPHIL NFR BLD AUTO: 0.1 % — SIGNIFICANT CHANGE UP (ref 0–6)
GAS PNL BLDA: SIGNIFICANT CHANGE UP
GLUCOSE SERPL-MCNC: 113 MG/DL — HIGH (ref 70–99)
GLUCOSE SERPL-MCNC: 96 MG/DL — SIGNIFICANT CHANGE UP (ref 70–99)
HCO3 BLDA-SCNC: 21 MMOL/L — SIGNIFICANT CHANGE UP (ref 21–28)
HCO3 BLDA-SCNC: 21 MMOL/L — SIGNIFICANT CHANGE UP (ref 21–28)
HCT VFR BLD CALC: 34.6 % — LOW (ref 39–50)
HCT VFR BLD CALC: 37.3 % — LOW (ref 39–50)
HGB BLD-MCNC: 11.1 G/DL — LOW (ref 13–17)
HGB BLD-MCNC: 12 G/DL — LOW (ref 13–17)
HOROWITZ INDEX BLDA+IHG-RTO: 100 — SIGNIFICANT CHANGE UP
IMM GRANULOCYTES NFR BLD AUTO: 1 % — HIGH (ref 0–0.9)
INR BLD: 1.07 — SIGNIFICANT CHANGE UP (ref 0.85–1.16)
LYMPHOCYTES # BLD AUTO: 0.93 K/UL — LOW (ref 1–3.3)
LYMPHOCYTES # BLD AUTO: 6.3 % — LOW (ref 13–44)
MAGNESIUM SERPL-MCNC: 2.3 MG/DL — SIGNIFICANT CHANGE UP (ref 1.6–2.6)
MCHC RBC-ENTMCNC: 28.5 PG — SIGNIFICANT CHANGE UP (ref 27–34)
MCHC RBC-ENTMCNC: 29.1 PG — SIGNIFICANT CHANGE UP (ref 27–34)
MCHC RBC-ENTMCNC: 32.1 G/DL — SIGNIFICANT CHANGE UP (ref 32–36)
MCHC RBC-ENTMCNC: 32.2 G/DL — SIGNIFICANT CHANGE UP (ref 32–36)
MCV RBC AUTO: 88.6 FL — SIGNIFICANT CHANGE UP (ref 80–100)
MCV RBC AUTO: 90.6 FL — SIGNIFICANT CHANGE UP (ref 80–100)
MONOCYTES # BLD AUTO: 0.67 K/UL — SIGNIFICANT CHANGE UP (ref 0–0.9)
MONOCYTES NFR BLD AUTO: 4.6 % — SIGNIFICANT CHANGE UP (ref 2–14)
NEUTROPHILS # BLD AUTO: 12.88 K/UL — HIGH (ref 1.8–7.4)
NEUTROPHILS NFR BLD AUTO: 87.5 % — HIGH (ref 43–77)
NRBC # BLD: 0 /100 WBCS — SIGNIFICANT CHANGE UP (ref 0–0)
NRBC # BLD: 0 /100 WBCS — SIGNIFICANT CHANGE UP (ref 0–0)
NRBC BLD-RTO: 0 /100 WBCS — SIGNIFICANT CHANGE UP (ref 0–0)
NRBC BLD-RTO: 0 /100 WBCS — SIGNIFICANT CHANGE UP (ref 0–0)
PCO2 BLDA: 34 MMHG — LOW (ref 35–48)
PCO2 BLDA: 35 MMHG — SIGNIFICANT CHANGE UP (ref 35–48)
PH BLDA: 7.38 — SIGNIFICANT CHANGE UP (ref 7.35–7.45)
PH BLDA: 7.39 — SIGNIFICANT CHANGE UP (ref 7.35–7.45)
PHOSPHATE SERPL-MCNC: 3.7 MG/DL — SIGNIFICANT CHANGE UP (ref 2.5–4.5)
PLATELET # BLD AUTO: 186 K/UL — SIGNIFICANT CHANGE UP (ref 150–400)
PLATELET # BLD AUTO: 223 K/UL — SIGNIFICANT CHANGE UP (ref 150–400)
PO2 BLDA: 31 MMHG — SIGNIFICANT CHANGE UP (ref 83–108)
PO2 BLDA: 89 MMHG — SIGNIFICANT CHANGE UP (ref 83–108)
POTASSIUM SERPL-MCNC: 4.5 MMOL/L — SIGNIFICANT CHANGE UP (ref 3.5–5.3)
POTASSIUM SERPL-MCNC: 4.8 MMOL/L — SIGNIFICANT CHANGE UP (ref 3.5–5.3)
POTASSIUM SERPL-SCNC: 4.5 MMOL/L — SIGNIFICANT CHANGE UP (ref 3.5–5.3)
POTASSIUM SERPL-SCNC: 4.8 MMOL/L — SIGNIFICANT CHANGE UP (ref 3.5–5.3)
PROT SERPL-MCNC: 7.9 G/DL — SIGNIFICANT CHANGE UP (ref 6–8.3)
PROTHROM AB SERPL-ACNC: 12.3 SEC — SIGNIFICANT CHANGE UP (ref 9.9–13.4)
RBC # BLD: 3.82 M/UL — LOW (ref 4.2–5.8)
RBC # BLD: 4.21 M/UL — SIGNIFICANT CHANGE UP (ref 4.2–5.8)
RBC # FLD: 14.4 % — SIGNIFICANT CHANGE UP (ref 10.3–14.5)
RBC # FLD: 14.6 % — HIGH (ref 10.3–14.5)
SAO2 % BLDA: 59.7 % — LOW (ref 94–98)
SAO2 % BLDA: 98 % — SIGNIFICANT CHANGE UP (ref 94–98)
SODIUM SERPL-SCNC: 137 MMOL/L — SIGNIFICANT CHANGE UP (ref 135–145)
SODIUM SERPL-SCNC: 141 MMOL/L — SIGNIFICANT CHANGE UP (ref 135–145)
WBC # BLD: 14.71 K/UL — HIGH (ref 3.8–10.5)
WBC # BLD: 8.99 K/UL — SIGNIFICANT CHANGE UP (ref 3.8–10.5)
WBC # FLD AUTO: 14.71 K/UL — HIGH (ref 3.8–10.5)
WBC # FLD AUTO: 8.99 K/UL — SIGNIFICANT CHANGE UP (ref 3.8–10.5)

## 2024-11-15 PROCEDURE — 99291 CRITICAL CARE FIRST HOUR: CPT

## 2024-11-15 PROCEDURE — 71045 X-RAY EXAM CHEST 1 VIEW: CPT | Mod: 26,77

## 2024-11-15 PROCEDURE — 71045 X-RAY EXAM CHEST 1 VIEW: CPT | Mod: 26

## 2024-11-15 PROCEDURE — 99233 SBSQ HOSP IP/OBS HIGH 50: CPT

## 2024-11-15 PROCEDURE — 99231 SBSQ HOSP IP/OBS SF/LOW 25: CPT

## 2024-11-15 RX ORDER — IPRATROPIUM BROMIDE AND ALBUTEROL SULFATE .5; 2.5 MG/3ML; MG/3ML
3 SOLUTION RESPIRATORY (INHALATION) EVERY 4 HOURS
Refills: 0 | Status: DISCONTINUED | OUTPATIENT
Start: 2024-11-15 | End: 2024-11-18

## 2024-11-15 RX ORDER — ACETAMINOPHEN 500 MG/5ML
1000 LIQUID (ML) ORAL ONCE
Refills: 0 | Status: COMPLETED | OUTPATIENT
Start: 2024-11-15 | End: 2024-11-15

## 2024-11-15 RX ORDER — ACETYLCYSTEINE 200 MG/ML
4 INHALANT RESPIRATORY (INHALATION) EVERY 4 HOURS
Refills: 0 | Status: DISCONTINUED | OUTPATIENT
Start: 2024-11-15 | End: 2024-11-18

## 2024-11-15 RX ORDER — IPRATROPIUM BROMIDE AND ALBUTEROL SULFATE .5; 2.5 MG/3ML; MG/3ML
3 SOLUTION RESPIRATORY (INHALATION) ONCE
Refills: 0 | Status: COMPLETED | OUTPATIENT
Start: 2024-11-15 | End: 2024-11-15

## 2024-11-15 RX ADMIN — SODIUM ZIRCONIUM CYCLOSILICATE 5 GRAM(S): 5 POWDER, FOR SUSPENSION ORAL at 12:23

## 2024-11-15 RX ADMIN — OFLOXACIN 1 DROP(S): 3 SOLUTION OPHTHALMIC at 00:33

## 2024-11-15 RX ADMIN — AMLODIPINE BESYLATE 10 MILLIGRAM(S): 10 TABLET ORAL at 06:42

## 2024-11-15 RX ADMIN — Medication 667 MILLIGRAM(S): at 14:36

## 2024-11-15 RX ADMIN — ACETYLCYSTEINE 4 MILLILITER(S): 200 INHALANT RESPIRATORY (INHALATION) at 22:56

## 2024-11-15 RX ADMIN — Medication 1 DROP(S): at 03:00

## 2024-11-15 RX ADMIN — Medication 1 APPLICATION(S): at 06:44

## 2024-11-15 RX ADMIN — ERYTHROMYCIN 1 APPLICATION(S): 5 OINTMENT OPHTHALMIC at 00:31

## 2024-11-15 RX ADMIN — ERYTHROMYCIN 1 APPLICATION(S): 5 OINTMENT OPHTHALMIC at 06:43

## 2024-11-15 RX ADMIN — Medication 1000 MILLIGRAM(S): at 22:10

## 2024-11-15 RX ADMIN — HEPARIN SODIUM 5000 UNIT(S): 1000 INJECTION INTRAVENOUS; SUBCUTANEOUS at 22:59

## 2024-11-15 RX ADMIN — Medication 1 DROP(S): at 06:43

## 2024-11-15 RX ADMIN — Medication 667 MILLIGRAM(S): at 18:25

## 2024-11-15 RX ADMIN — Medication 1 DROP(S): at 10:30

## 2024-11-15 RX ADMIN — Medication 1 APPLICATION(S): at 18:43

## 2024-11-15 RX ADMIN — IPRATROPIUM BROMIDE AND ALBUTEROL SULFATE 3 MILLILITER(S): .5; 2.5 SOLUTION RESPIRATORY (INHALATION) at 21:43

## 2024-11-15 RX ADMIN — Medication 1000 MILLIGRAM(S): at 21:40

## 2024-11-15 RX ADMIN — Medication 667 MILLIGRAM(S): at 10:29

## 2024-11-15 RX ADMIN — Medication 1 DROP(S): at 14:36

## 2024-11-15 RX ADMIN — Medication 80 MILLILITER(S): at 08:55

## 2024-11-15 RX ADMIN — HEPARIN SODIUM 5000 UNIT(S): 1000 INJECTION INTRAVENOUS; SUBCUTANEOUS at 06:41

## 2024-11-15 RX ADMIN — OFLOXACIN 1 DROP(S): 3 SOLUTION OPHTHALMIC at 12:23

## 2024-11-15 RX ADMIN — OFLOXACIN 1 DROP(S): 3 SOLUTION OPHTHALMIC at 06:43

## 2024-11-15 RX ADMIN — Medication 4 MILLILITER(S): at 21:42

## 2024-11-15 RX ADMIN — OFLOXACIN 1 DROP(S): 3 SOLUTION OPHTHALMIC at 18:31

## 2024-11-15 RX ADMIN — ERYTHROMYCIN 1 APPLICATION(S): 5 OINTMENT OPHTHALMIC at 18:25

## 2024-11-15 RX ADMIN — ERYTHROMYCIN 1 APPLICATION(S): 5 OINTMENT OPHTHALMIC at 12:22

## 2024-11-15 RX ADMIN — Medication 1 DROP(S): at 18:27

## 2024-11-15 RX ADMIN — HEPARIN SODIUM 5000 UNIT(S): 1000 INJECTION INTRAVENOUS; SUBCUTANEOUS at 14:37

## 2024-11-15 RX ADMIN — Medication 1 DROP(S): at 22:58

## 2024-11-15 RX ADMIN — DOXAZOSIN MESYLATE 8 MILLIGRAM(S): 8 TABLET ORAL at 23:20

## 2024-11-15 NOTE — RAPID RESPONSE TEAM SUMMARY - NSSITUATIONBACKGROUNDRRT_GEN_ALL_CORE
44 YOF with unclear PMH (?stroke, MI) who was found down at work, intubated for airway protection and found to have acute parenchymal hemorrhage within nabil with mass effect (+ acute/subacute right cerebellar infarct) in setting of hypertensive emergency, transferred to [facility] for further neurosurgical care. Hospital course c/b poor neurologic recovery s/p trach-PEG, AUR s/p gabriel, ANIKA on CKD c/b hyperkalemia, HAP s/p amp-sulbactam (EOT 10/23), K aerogenes bacteremia. RR called for acute desaturation to 30% without improvement in O2 sats w/ bag-to-trach. On arrival patient tachycardic to 120s w/ O2 sats at 40% bag-to-trach and tachypneic, afebrile. Patient w/ copious mouth secretions and reduced air entry to R lung on exam.  44M with unclear PMH (?stroke, MI) who was found down at work, intubated for airway protection and found to have acute parenchymal hemorrhage within nabil with mass effect (+ acute/subacute right cerebellar infarct) in setting of hypertensive emergency, transferred to [facility] for further neurosurgical care. Hospital course c/b poor neurologic recovery s/p trach-PEG, AUR s/p gabirel, ANIKA on CKD c/b hyperkalemia, HAP s/p amp-sulbactam (EOT 10/23), K aerogenes bacteremia. RR called for acute desaturation to 30% without improvement in O2 sats w/ bag-to-trach. On arrival patient tachycardic to 120s w/ O2 sats at 40% bag-to-trach and tachypneic, afebrile. Patient w/ copious mouth secretions and reduced air entry to R lung on exam.

## 2024-11-15 NOTE — PROGRESS NOTE ADULT - SUBJECTIVE AND OBJECTIVE BOX
Patient was seen and examined at bedside. Case discuss with the primary team.    no further fever , discussed with Nursing secretions have improved and frequency of suctioning is better than past 2 days   o2 concentration weaned to 70%       OBJECTIVE:  Vital Signs Last 24 Hrs  T(C): 36.7 (15 Nov 2024 16:32), Max: 36.7 (14 Nov 2024 20:34)  T(F): 98 (15 Nov 2024 16:32), Max: 98 (14 Nov 2024 20:34)  HR: 100 (15 Nov 2024 16:32) (67 - 100)  BP: 107/74 (15 Nov 2024 16:32) (107/74 - 143/97)  BP(mean): --  RR: 18 (15 Nov 2024 16:32) (17 - 20)  SpO2: 100% (15 Nov 2024 16:32) (94% - 100%)    Parameters below as of 15 Nov 2024 16:32  Patient On (Oxygen Delivery Method): tracheostomy collar                PHYSICAL EXAM:  Gen: resting comfortably, NAD  HEENT: NCAT, MMM  Neck: trach with minimal secretions   CV: RRR, no m/r/g, peripheral pulses 2+ , tachycardia   Pulm: CTAB, no increased work of breathing, no rales/rhonchi , tachypnea   Abd: soft, ND, NT, PEG  Neuro: AAOx 2     LABS:                                                                               11.1   8.99  )-----------( 186      ( 15 Nov 2024 07:18 )             34.6     11-15    137  |  108  |  68[H]  ----------------------------<  96  4.5   |  20[L]  |  2.05[H]    Ca    9.2      15 Nov 2024 07:18  Phos  3.7     11-15  Mg     2.3     11-15                       CXR:  Limited inspiration.     EKG  Sinus tach @105bpm     MEDICATIONS  (STANDING):  amLODIPine   Tablet 10 milliGRAM(s) Oral daily  artificial tears (preservative free) Ophthalmic Solution 1 Drop(s) Right EYE every 4 hours  calcium acetate 667 milliGRAM(s) Oral three times a day with meals  doxazosin 8 milliGRAM(s) Oral at bedtime  erythromycin   Ointment 1 Application(s) Right EYE every 6 hours  heparin   Injectable 5000 Unit(s) SubCutaneous every 8 hours  ofloxacin 0.3% Solution 1 Drop(s) Right EYE every 6 hours  petrolatum Ophthalmic Ointment 1 Application(s) Both EYES two times a day  polyethylene glycol 3350 17 Gram(s) Oral two times a day  sodium chloride 0.9%. 1000 milliLiter(s) (80 mL/Hr) IV Continuous <Continuous>  sodium zirconium cyclosilicate 5 Gram(s) Oral daily    MEDICATIONS  (PRN):  acetaminophen   Oral Liquid .. 650 milliGRAM(s) Oral every 6 hours PRN Temp greater or equal to 38C (100.4F), Mild Pain (1 - 3)  albuterol/ipratropium for Nebulization 3 milliLiter(s) Nebulizer every 6 hours PRN Shortness of Breath and/or Wheezing      MEDICATIONS  (PRN):

## 2024-11-15 NOTE — CHART NOTE - NSCHARTNOTEFT_GEN_A_CORE
Admitting Diagnosis:   Patient is a 44y old  Male who presents with a chief complaint of brain stem bleed (15 Nov 2024 01:07)      PAST MEDICAL & SURGICAL HISTORY:  Acute myocardial infarction      CVA (cerebral vascular accident)      Current Nutrition Order:  Diet, NPO with Tube Feed:   Tube Feeding Modality: Gastrostomy  Nepro with Carb Steady (NEPRORTH)  Total Volume for 24 Hours (mL): 1080  Total Number of Cans: 5  Continuous  Starting Tube Feed Rate {mL per Hour}: 45  Increase Tube Feed Rate by (mL): 10     Every 4 hours  Until Goal Tube Feed Rate (mL per Hour): 60  Tube Feed Duration (in Hours): 18  Tube Feed Start Time: 10:00  Tube Feed Stop Time: 04:00  Banatrol TF     Qty per Day:  2 (10-28-24 @ 16:11) [Active]    PO Intake: NPO with tube feeds meeting 100% estimated nutrient needs     GI Issues: WDL, last bowel movement documented 11/11 as per flowsheet, however, spoke with RN this morning who reported no diarrhea to her knowledge at the time.     Pain: Absence of non-verbal indicators    Skin Integrity: WDL, PEG present         11-14-24 @ 07:01  -  11-15-24 @ 07:00  --------------------------------------------------------  IN: 2700 mL / OUT: 2025 mL / NET: 675 mL    11-15-24 @ 07:01  -  11-15-24 @ 12:33  --------------------------------------------------------  IN: 400 mL / OUT: 0 mL / NET: 400 mL        Labs:   11-15    137  |  108  |  68[H]  ----------------------------<  96  4.5   |  20[L]  |  2.05[H]    Ca    9.2      15 Nov 2024 07:18  Phos  3.7     11-15  Mg     2.3     11-15      CAPILLARY BLOOD GLUCOSE          Medications:  MEDICATIONS  (STANDING):  amLODIPine   Tablet 10 milliGRAM(s) Oral daily  artificial tears (preservative free) Ophthalmic Solution 1 Drop(s) Right EYE every 4 hours  calcium acetate 667 milliGRAM(s) Oral three times a day with meals  doxazosin 8 milliGRAM(s) Oral at bedtime  erythromycin   Ointment 1 Application(s) Right EYE every 6 hours  heparin   Injectable 5000 Unit(s) SubCutaneous every 8 hours  ofloxacin 0.3% Solution 1 Drop(s) Right EYE every 6 hours  petrolatum Ophthalmic Ointment 1 Application(s) Both EYES two times a day  polyethylene glycol 3350 17 Gram(s) Oral two times a day  sodium zirconium cyclosilicate 5 Gram(s) Oral daily    MEDICATIONS  (PRN):  acetaminophen   Oral Liquid .. 650 milliGRAM(s) Oral every 6 hours PRN Temp greater or equal to 38C (100.4F), Mild Pain (1 - 3)  albuterol/ipratropium for Nebulization 3 milliLiter(s) Nebulizer every 6 hours PRN Shortness of Breath and/or Wheezing    Dosing Anthropometrics:   Height for BMI (FEET)	5 Feet  Height for BMI (INCHES)	8 Inch(s)  Height for BMI (CM)	172.72 Centimeter(s)  Weight for BMI (lbs)	176.4 lb  Weight for BMI (kg)	80 kg  Body Mass Index	              26.8  ideal body weight:               154 pounds / 70 kg   %ideal body weight             114%     Weight Change: No new wt obtained since admit, strongly recommend nursing to obtain new wt to track/ trend changes     Estimated energy needs:  Estimated Energy Needs Weight (lbs)	176 lb  Estimated Energy Needs Weight (kg)	80 kg  Estimated Energy Needs From (christiana/kg) 22  Estimated Energy Needs To (christiana/kg)	27  Estimated Energy Needs Calculated From (christiana/kg) 1760  Estimated Energy Needs Calculated To (christiana/kg)	2160    Estimated Protein Needs Weight (lbs)	176 lb  Estimated Protein Needs Weight (kg)	80 kg  Estimated Protein Needs From (g/kg)	1.0  Estimated Protein Needs To (g/kg)	1.4  Estimated Protein Needs Calculated From (g/kg) 80   Estimated Protein Needs Calculated To (g/kg)	112    Estimated Fluid Needs Weight (lbs)	176 lb  Estimated Fluid Needs Weight (kg)	80 kg  Estimated Fluid Needs From (ml/kg)	20  Estimated Fluid Needs To (ml/kg)	25  Estimated Fluid Needs Calculated From (ml/kg)	1600  Estimated Fluid Needs Calculated To (ml/kg)	2000    Other Calculations: Based on actual body weight as ideal body weigh between %, no longer in ICU setting. Needs adjusted for age, clinical course, vented.     Subjective: This is a 44 year old male, unknown past medical history (reported stroke and MI by coworkers) presented to [facility] with AMS, Pt was working at [employer] when he was found down by coworkers. EMS called and pt brought to [facility] ED. Intubated, sedated, started on cardene for SBPs in 200s. CT head showed brain stem bleed. Transferred to NSICU for further management.    Patient seen at bedside on 9UR for nutrition follow up. Current diet order: remains NPO with tube feeds meeting 100% estimated nutrient needs. Continues to receive Nepro 1.8 tube feed formula at goal rate of 60 cc/hr x18 hrs via PEG overnight as per flowsheet, however, as per flowsheet this afternoon, pt being titrated up to goal - **not necessary as pt has already been receiving at goal rate. Spoke with RN this morning who denied pt with any nausea/ vomiting/ constipation/ diarrhea to her knowledge at the time, however, no bowel movements documented as per flowsheet, ? if diarrhea persists, will keep order of Banatrol BID for now but once completely resolved will discontinue. Labs including potassium and phosphorus WNL, BUN/ Cr remain elevated; rx reviewed as above. RDN will continue to follow, see nutrition recommendations below.     Previous Nutrition Diagnosis: Inadequate Oral Intake related to inability to meet nutritional demands via PO as evidenced by NPO with tube feedings    Active [  x ]  Resolved [   ]    Goal: Pt will consume >/= 75% of all meals and supplements via tolerated route     Recommendations:  1. NPO with tube feedings, continue with current tube feed regimen to continue to meet 100% estimated needs:  **Nepro 1.8 via PEG at goal rate of 60mL/hour x 18 hours, to provide 1080mL total volume from tube feeding, 1912 calories, 87g protein, 785mL free water; this meets ~24 calories/kg, 1.1g protein/kg; consider 190mL free water flush every 4 hours; no plans to change to bolus feeds at this time due to risk for aspiration/ poor mental status   2. ? if diarrhea still persists, will continue with Banatrol BID for now, however, once diarrhea completely resolved can discontinue   3. Maintain aspiration precautions at all times; monitor for signs/symptoms of intolerance*  4. Monitor weights (weekly), GI function, skin integrity; electrolytes, BMP, glucose, CBC per MD discretion **renal indices**  5. Pain and bowel regimen per medical team  6. Align nutrition with goals of care at all times  7. Recommend nursing to obtain new wt to track/ trend changes     Risk Level: High [   ] Moderate [ x  ] Low [   ]

## 2024-11-15 NOTE — CHART NOTE - NSCHARTNOTEFT_GEN_A_CORE
at 8:15pm, nurse notified neurosurgery PA that patient was satting in 30s, tachycardic to 140s. Rapid response called. Patient bagged, 100% fio2, heavily suctioned. CXR/POCUS unremarkable. ABG c/w desaturation. WBC 14.71. Afebrile. O2 improved to 90s and patient upgraded to ICU.

## 2024-11-15 NOTE — RAPID RESPONSE TEAM SUMMARY - NSADDTLFINDINGSRRT_GEN_ALL_CORE
CXR w/ ?RLL infiltrate compared to 6AM CXR. ABG 7.39/34/31/59.7 (pre suctioning). labs significant for a new WCC 14.71 w/ neutrophilic predominance.

## 2024-11-15 NOTE — PROGRESS NOTE ADULT - SUBJECTIVE AND OBJECTIVE BOX
=================================  NEUROCRITICAL CARE ATTENDING NOTE  =================================    HPI:  Patient is a 43y/o M  unknown past medical history (reported stroke and MI by coworkers) presented to [facility] with AMS, Pt was working at [employer] when he was found down by coworkers. EMS called and pt brought to [facility] ED. Intubated, sedated, started on cardene for SBPs in 200s. CT head showed brain stem bleed. Transferred to NSICU for further management.  (30 Sep 2024 12:55)  ICH Score 3      ICU Vital Signs Last 24 Hrs  T(C): 36.7 (15 Nov 2024 16:32), Max: 36.7 (15 Nov 2024 16:32)  T(F): 98 (15 Nov 2024 16:32), Max: 98 (15 Nov 2024 16:32)  HR: 100 (15 Nov 2024 16:32) (67 - 100)  BP: 107/74 (15 Nov 2024 16:32) (107/74 - 143/97)  RR: 18 (15 Nov 2024 16:32) (17 - 18)  SpO2: 100% (15 Nov 2024 16:32) (95% - 100%)      11-14-24 @ 07:01  -  11-15-24 @ 07:00  --------------------------------------------------------  IN: 2700 mL / OUT: 2025 mL / NET: 675 mL    11-15-24 @ 07:01  -  11-15-24 @ 21:41  --------------------------------------------------------  IN: 500 mL / OUT: 800 mL / NET: -300 mL      PHYSICAL EXAMINATION  NEURO:  Patient eye opens spontaneously, R 6 sluggish L 4 brisk; FC (2 fingers, wiggles toes), tracking with eyes up and down, R UE localizes, B LE withdraws, L UE trace movements, not mimicking; reflexive hand   GENERAL: Calm  EENT: Anicteric   CARDIOVASCULAR: (+) S1 S2, normal rate and regular rhythm  PULMONARY: Diminished, copious secretions  ABDOMEN: soft, nontender with normoactive bowel sounds  EXTREMITIES: no edema  SKIN: no rash      MEDICATIONS:   acetaminophen   Oral Liquid .. 650 milliGRAM(s) Oral every 6 hours PRN  acetaminophen   Oral Liquid .. 1000 milliGRAM(s) Oral once  albuterol/ipratropium for Nebulization 3 milliLiter(s) Nebulizer every 4 hours  amLODIPine   Tablet 10 milliGRAM(s) Oral daily  artificial tears (preservative free) Ophthalmic Solution 1 Drop(s) Right EYE every 4 hours  calcium acetate 667 milliGRAM(s) Oral three times a day with meals  doxazosin 8 milliGRAM(s) Oral at bedtime  erythromycin   Ointment 1 Application(s) Right EYE every 6 hours  heparin   Injectable 5000 Unit(s) SubCutaneous every 8 hours  ofloxacin 0.3% Solution 1 Drop(s) Right EYE every 6 hours  petrolatum Ophthalmic Ointment 1 Application(s) Both EYES two times a day  polyethylene glycol 3350 17 Gram(s) Oral two times a day  sodium chloride 3%  Inhalation 4 milliLiter(s) Inhalation every 4 hours  sodium zirconium cyclosilicate 5 Gram(s) Oral daily      LABS:                      12.0   14.71 )-----------( 223      ( 15 Nov 2024 20:36 )             37.3     11-15    141  |  108  |  63[H]  ----------------------------<  113[H]  4.8   |  19[L]  |  2.05[H]    Ca    9.7      15 Nov 2024 20:36  Phos  3.7     11-15  Mg     2.3     11-15    TPro  7.9  /  Alb  3.6  /  TBili  0.4  /  DBili  x   /  AST  40  /  ALT  75[H]  /  AlkPhos  152[H]  11-15    LIVER FUNCTIONS - ( 15 Nov 2024 20:36 )  Alb: 3.6 g/dL / Pro: 7.9 g/dL / ALK PHOS: 152 U/L / ALT: 75 U/L / AST: 40 U/L / GGT: x           ABG - ( 15 Nov 2024 20:33 )  pH, Arterial: 7.39  pH, Blood: x     /  pCO2: 34    /  pO2: 31    / HCO3: 21    / Base Excess: -3.6  /  SaO2: 59.7        IMPRESSION:  No pulmonary embolism through the level of the lobar pulmonary arteries.   Evaluation of more distal segmental and subsegmental pulmonary arteries is limited by suboptimal contrast bolus timing.  Secretion in trachea and segmental and subsegmental bronchi, most prominent in right lower lobe with associated groundglass opacity and   small consolidation/atelectasis in keeping with aspiration.     =================================  NEUROCRITICAL CARE ATTENDING NOTE  =================================    HPI:  Patient is a 45y/o M  unknown past medical history (reported stroke and MI by coworkers) presented to [facility] with AMS, Pt was working at [employer] when he was found down by coworkers. EMS called and pt brought to [facility] ED. Intubated, sedated, started on cardene for SBPs in 200s. CT head showed brain stem bleed. Transferred to NSICU for further management.  (30 Sep 2024 12:55)  ICH Score 3      ICU Vital Signs Last 24 Hrs  T(C): 36.7 (15 Nov 2024 16:32), Max: 36.7 (15 Nov 2024 16:32)  T(F): 98 (15 Nov 2024 16:32), Max: 98 (15 Nov 2024 16:32)  HR: 100 (15 Nov 2024 16:32) (67 - 100)  BP: 107/74 (15 Nov 2024 16:32) (107/74 - 143/97)  RR: 18 (15 Nov 2024 16:32) (17 - 18)  SpO2: 100% (15 Nov 2024 16:32) (95% - 100%)      11-14-24 @ 07:01  -  11-15-24 @ 07:00  --------------------------------------------------------  IN: 2700 mL / OUT: 2025 mL / NET: 675 mL    11-15-24 @ 07:01  -  11-15-24 @ 21:41  --------------------------------------------------------  IN: 500 mL / OUT: 800 mL / NET: -300 mL      PHYSICAL EXAMINATION  NEURO:  Patient eye opens spontaneously, R 6 sluggish L 4 brisk; FC  tracking with eyes up and down, RUE localizes, RLE wiggles toes, RUE HG 3/5  LUE and LLE 0/5  GENERAL: Calm  EENT: Anicteric   CARDIOVASCULAR: S1/S2, RRR  PULMONARY: Diminished, copious secretions  ABDOMEN: soft, nontender with normoactive bowel sounds  EXTREMITIES: no edema  SKIN: no rash      MEDICATIONS:   acetaminophen   Oral Liquid .. 650 milliGRAM(s) Oral every 6 hours PRN  acetaminophen   Oral Liquid .. 1000 milliGRAM(s) Oral once  albuterol/ipratropium for Nebulization 3 milliLiter(s) Nebulizer every 4 hours  amLODIPine   Tablet 10 milliGRAM(s) Oral daily  artificial tears (preservative free) Ophthalmic Solution 1 Drop(s) Right EYE every 4 hours  calcium acetate 667 milliGRAM(s) Oral three times a day with meals  doxazosin 8 milliGRAM(s) Oral at bedtime  erythromycin   Ointment 1 Application(s) Right EYE every 6 hours  heparin   Injectable 5000 Unit(s) SubCutaneous every 8 hours  ofloxacin 0.3% Solution 1 Drop(s) Right EYE every 6 hours  petrolatum Ophthalmic Ointment 1 Application(s) Both EYES two times a day  polyethylene glycol 3350 17 Gram(s) Oral two times a day  sodium chloride 3%  Inhalation 4 milliLiter(s) Inhalation every 4 hours  sodium zirconium cyclosilicate 5 Gram(s) Oral daily      LABS:                      12.0   14.71 )-----------( 223      ( 15 Nov 2024 20:36 )             37.3     11-15    141  |  108  |  63[H]  ----------------------------<  113[H]  4.8   |  19[L]  |  2.05[H]    Ca    9.7      15 Nov 2024 20:36  Phos  3.7     11-15  Mg     2.3     11-15    TPro  7.9  /  Alb  3.6  /  TBili  0.4  /  DBili  x   /  AST  40  /  ALT  75[H]  /  AlkPhos  152[H]  11-15    LIVER FUNCTIONS - ( 15 Nov 2024 20:36 )  Alb: 3.6 g/dL / Pro: 7.9 g/dL / ALK PHOS: 152 U/L / ALT: 75 U/L / AST: 40 U/L / GGT: x           ABG - ( 15 Nov 2024 20:33 )  pH, Arterial: 7.39  pH, Blood: x     /  pCO2: 34    /  pO2: 31    / HCO3: 21    / Base Excess: -3.6  /  SaO2: 59.7        IMPRESSION:  No pulmonary embolism through the level of the lobar pulmonary arteries.   Evaluation of more distal segmental and subsegmental pulmonary arteries is limited by suboptimal contrast bolus timing.  Secretion in trachea and segmental and subsegmental bronchi, most prominent in right lower lobe with associated groundglass opacity and   small consolidation/atelectasis in keeping with aspiration.

## 2024-11-15 NOTE — RAPID RESPONSE TEAM SUMMARY - NSMEDICATIONSRRT_GEN_ALL_CORE
#Aspiration pneumonitis vs pna vs mucous plug  - hold tube feeds o/n  - standing duonebs q4h w/ standing 3% nebulised saline q4h for airway clearance  - aggressive suctioning q1h  - low threshold to start abx if febrile

## 2024-11-15 NOTE — RAPID RESPONSE TEAM SUMMARY - NSOTHERINTERVENTIONSRRT_GEN_ALL_CORE
Bedside aggressive suctioning w/ red rubber tube resulted in O2 sat improvement to 93% which remained on [id number] collar at 40% (baseline). Tachycardia and tachypnea improved. Patient upgraded to NICU for frequent suctioning needs.    Plan discussed w/ MICU attending Dr Paz

## 2024-11-15 NOTE — PROGRESS NOTE ADULT - SUBJECTIVE AND OBJECTIVE BOX
HPI:  Unknown age male, unknown past medical history (reported stroke and MI by coworkers) presented to [facility] with AMS, Pt was working at [employer] when he was found down by coworkers. EMS called and pt brought to [facility] ED. Intubated, sedated, started on cardene for SBPs in 200s. CT head showed brain stem bleed. Transferred to NSICU for further management.  (30 Sep 2024 12:55)      HOSPITAL COURSE:  9/30: transferred from [facility]. A line placed. Versed dc'd. Cy Rader at bedside, states pt has family in [place], cannot confirm medications or PMH other than stroke and MI. 250cc bolus 3% given. LR switched to NS. hydralazine 25q8 started, 3% started, switched propofol to precedex   10/1: stability CTH done. Added labetalol, started TF. Palliative consulted. ethics consulted to determine surrogate. febrile 103, pan cx sent  10/2: BD 2, GEORGE overnight. TF resumed. Desatt'd to 80s, FiO2 inc. to 50. Fentanyl given, ABG, CXR ordered. Maxxed on precedex, started on propofol for DARIEN -4 - -5. Precedex dc'd. Duonebs, mucomyst, hypertonic added. 3% dc'd. Cardene dc'd. Start vanc/CTX. Increased labetalol 200q8. MRSA negative, dc'd vanc. ETT pulled back 2cm x 2, good positioning after confirmatory chest xray. Ethics attempting to establish HCP with family. Na 159, starting FW 250q6 for range 150-155.   10/3: BD3, GEORGE o/n, neuro stable. Na elevating, FW increased to 300q6. Dc'd bowel reg for diarrhea. vEEG started. SQH 5000q8 tonight.   10/4: BD 4, albumn bolus, incr. LR to 80 2/2 incr. in Cr, LR to 100cc/hr for uptrending Cr. Started 7% hypersal for 48hrs and SL atropine for inline/oral thick secretions. Dc'd CTX and started ancef for MSSA in the sputum. Nephrology consulted for CKD, f/u recs. SBP 170s, given hydralazine 10mg IVP.   10/5: BD5, o/n 10mg IVP hydralazine given for SBP 170s and started on hydralazine 25q8 via OGT. 10mg IV push labetalol for SBP > 160s. RT placed for diarrhea.   10/6: BD6, o/n FW increased to 350q4 per nephrology recs. IV tylenol for temp 100.6, SBp 160s presumed uncomfortable.   10/7: BD7, overnight pancultured for temp 101.8F.   10/8: BD8. GEORGE. Cr bumped. decreased LR to 75cc/hr. Adding simethicone ATC. incr hydralazine 50mgTID. Incr labetalol 300mgTID. Na 145, decreased FWF to 250q6. Start precedex. FENa consistent with intrinsic kidney injury. Pend repeat renal US. Retaining up to 1.3L, bladder scans q6, straight cath PRN  10/9: BD 9. GEORGE overnight. Neuro stable. abd xray for distention w non-specific gas pattern, OGT to LIWS for morning. duonebs/mucomyst to q8 for improving secretions. Changed tube feeds to Jevity 1.5 20cc/hr, low rate due to abdominal distention, nepro dense and more difficult to digest. Tolerating CPAP, confirmed by ABG.   10/10: BD 10. GEORGE overnight. Neuro stable. (+) gabriel for urinary retention on bladder scan. inc TF to goal rate of 40cc/hr. family leaning toward pursuing trach/PEG. 1/2 amp for FS 81.   10/11: BD 11. GEORGE overnight. Neuro stable. Trach/PEG consults placed.   10/12: BD 12. GEORGE overnight. Neuro stable. MRI brain complete.   10/13: BD 13. Increase flomax. Hold SQH after PM dose for trach tm. IVL.   10/14: BD 14. GEORGE overnight, remains on AC/VC. Gabriel placed for urinary retention. Dc'd free water.  S/p trach with pulm. NGT placed and CXR confirmed in good position.   10/15: BD 15, GEORGE ovn. resumed feeds. spiked 101, pan cx sent.   10/16: BD 16. GEORGE ovn. Lokelma 5mg for K+ 5.4. Started vanc q 24/zosyn for empiric PNA coverage, IVF to 100/hr. PEG held for fever.   10/17: BD 17,  ordered serum osm and urine osm for am. Started sinemet for neurostimulation. Increased cardura to 0.8. Started FW 100q4, dc'd IVF. MRSA negative, dc'd vanc. NGT replaced d/t coiling.   10/18: BD 18, GEORGE overnight, neuro stable. Amantadine added for neurostim. zosyn changed to unasyn for acinetobacter baumannii, failed TOV and required SC  10/19: BD 19, GEORGE ovn. cardura 2mg added for retention. labetalol decreased 200q8, hydralazine decreased 25q8. Gabriel replaced.   10/20: BD20, GEORGE overnight. NGT dislodged, replaced. PEG tomorrow w/ gen surg, FW increased to 150q4 and labetalol decreased to 100q8, lokelma given for hyperkalemia.   10/21: BD 21. POD0 PEG placement with Gen surg. decr labetolol to 50q8, incr. cardura to 0.4, started lokelma and phoslo, dc gabriel POD0 PEG placement with Gen surg.  10/22: BD 22. Plan to start TF today via PEG. dc labetalol, Following ophtho recs. Increased apnea settings - found to be in cheyne-moe respiration. CPAP 5/5.  10/23: BD 23. hydralazine d/c'd, trach collar trial today. Rectal tube placed at 6am.  10/24: BD24, o/n lokelma held due to diarrhea. Free water 100q6 resumed. dc'd tamsulosin, amantadine. Incr'd cardura to 8mg qhs. Dc'd FW. Switched jevity to nepro. gabriel placed for high urine output. Started SL atropine for oral secretions. Dc'd free water.  10/25: BD25, o/n decreased suctioning requirements to > q4hrs, GEORGE. Cr improving, cont phoslo, lokelma held at this time. Gabriel placed yest, cont. Tolerating trach collar. Given 500cc plasmalyte bolus for ANIKA. Dc'd sinemet.   10/26: BD26, o/n resumed lokelma 5mg daily and resumed 100cc free water q6hrs. Change in neuro status with new right pupillary dilation with anisocoria (right pupil 6mm fixed and left pupil 3mm briskly reactive). Given 23.4% NaCl bullet, taken for emergent CTH showing mostly resolved pontine hemorrhage, continued brainstem hypodensity likely edema d/t hemorrhage, no new hemorrhage or infarct, no herniation, mild increase in size of left lateral ventricle. Vitals remaine stable. Na goal > 140.   10/27: BD27, o/n GEORGE.Neuro stable. Pend stepdown with airway bed.   10/28: BD 28. GEORGE overnight. Neuro stable. Miralax ordered. Gabriel removed, pending TOV.  10/29: BD 29. GEORGE o/n. Given 2L NS over 8 hrs for increased BUN/Cr ratio. Gabriel placed for frequent straight cath.   10/30: BD 30.   10/31: BD 31. GEORGE overnight. Na 149, increased free water to 200q6. 1L NS for uptrending BUN.   11/1: BD 32. GEORGE overnight. Given 1L NS for dehydration. Na 146, increased FW to 250q6.   11/2: BD 33 GEORGE overnight, neuro stable, given 500cc bolus for net negative status and tachycardia   11/3: BD 34, GEORGE overnight, neuro stable. Patient remains tachycardic, EKG showing sinus tachycardia, given additional 500cc NS bolus. Febrile to 101.9F, pan cultured (without UA), CXR WNL, given tylenol.   11/4: BD 35, GEORGE overnight, neuro stable. Given 1L NS for tachycardia. sputum (+) for stenotropohomas maltophilia.   11/5: BD 36 GEORGE overnight, neuro stable. Vancomycin dc'd. Chest PT BID. ID consulted, cont zosyn.  11/6: BD 37. blood cx + klebsiella dc zosyn changed to cefepime, CTAP ordered, rpt blood cx sent.    11/7: BD 38. Pending CT A/P, given 250cc bolus and starting maintenance fluids overnight. Pending CT A/P after bolus   11/8: BD 39. CT CAP negative for infection.   11/9: BD 40. GEORGE overnight.  11/10: BD 41. GEORGE overnight. desat to 85 on trach collar, O2 inc to 10L and 100%, O2 sat inc to 95. pt tachy to 110s, euvolemic. given tylenol. ABG and CXR ordered. spiked fever, pancultured, RVP negative. AM ABG w pO2 79, rpt w pO2 79. pt appears comfortable, satting 94%.   11/11: BD 42. GEORGE overnight. pt became tachy to 130s, desat to 90 on 100% FiO2 and 10L. suctioned, (+) productive cough. temp 101.4, given 1g IV tylenol and 500cc NS bolus for euvolemia. fever and HR downtrending. LE dopplers negative for dvt  11/12: BD 43, GEOGRE ovn, fever and HR downtrending, satting 97% 70% FIO2  11/13: BD 44, GEORGE ovn. started standing tylenol x24 hours for tachycardia. desat to 80s, o2 increased. CXR stable, pending CTA PE protocol.   11/14: BD 45, GEORGE overnight, neuro stable. resp therapy dec FiO2 to 70%.   11/15: BD 46, GEORGE overnight, neuro stable     OVERNIGHT EVENTS: Pt seen and examined in bed on 9U, unable to offer any acute complaints at this time     Vital Signs Last 24 Hrs  T(C): 36.7 (14 Nov 2024 20:34), Max: 37.4 (14 Nov 2024 05:10)  T(F): 98 (14 Nov 2024 20:34), Max: 99.4 (14 Nov 2024 05:10)  HR: 97 (14 Nov 2024 20:34) (96 - 106)  BP: 136/95 (14 Nov 2024 20:34) (124/80 - 146/87)  BP(mean): --  RR: 17 (14 Nov 2024 20:34) (16 - 20)  SpO2: 99% (14 Nov 2024 20:34) (92% - 99%)    Parameters below as of 14 Nov 2024 22:41  Patient On (Oxygen Delivery Method): tracheostomy collar        I&O's Summary    13 Nov 2024 07:01  -  14 Nov 2024 07:00  --------------------------------------------------------  IN: 1880 mL / OUT: 1500 mL / NET: 380 mL    14 Nov 2024 07:01  -  15 Nov 2024 01:07  --------------------------------------------------------  IN: 1980 mL / OUT: 1225 mL / NET: 755 mL        PHYSICAL EXAM:  Constitutional: Pt found in bed in NAD   ENT: PERRL, vertical EOMi, tracks vertically, R 3mm brisk, L 3mm brisk   Respiratory: +trach, breathing non-labored, symmetrical chest wall movement  Cardiovascuar: RRR, no murmurs  Gastrointestinal: +PEG, abdomen soft, non tender  Neurological:  AAOX0. Intermittently follows commands by showing two fingers or thumbs up   Motor: RUE tricep 4/5 HG 3/5 wiggles fingers spontaneously, RLE wiggles toes spontaneously, LUE spontaneously extends intermittently, LLE withdraws from noxious  Sensation: unable to assess   Pronator Drift: unable to assess   Wounds/Drains: N/A    TUBES/LINES:  [x] Gabriel  [] Lumbar Drain  [] Wound Drains  [] Others      DIET:  [] NPO  [] Mechanical  [x] Tube feeds via PEG     LABS:                        11.4   7.68  )-----------( 209      ( 14 Nov 2024 07:39 )             36.1     11-14    140  |  108  |  78[H]  ----------------------------<  107[H]  4.5   |  19[L]  |  2.55[H]    Ca    9.4      14 Nov 2024 07:39  Phos  3.1     11-14  Mg     2.6     11-14        Urinalysis Basic - ( 14 Nov 2024 07:39 )    Color: x / Appearance: x / SG: x / pH: x  Gluc: 107 mg/dL / Ketone: x  / Bili: x / Urobili: x   Blood: x / Protein: x / Nitrite: x   Leuk Esterase: x / RBC: x / WBC x   Sq Epi: x / Non Sq Epi: x / Bacteria: x          CAPILLARY BLOOD GLUCOSE          Drug Levels: [] N/A    CSF Analysis: [] N/A      Allergies    Allergy Status Unknown    Intolerances      MEDICATIONS:  Antibiotics:    Neuro:  acetaminophen   Oral Liquid .. 650 milliGRAM(s) Oral every 6 hours PRN    Anticoagulation:  heparin   Injectable 5000 Unit(s) SubCutaneous every 8 hours    OTHER:  albuterol/ipratropium for Nebulization 3 milliLiter(s) Nebulizer every 6 hours PRN  amLODIPine   Tablet 10 milliGRAM(s) Oral daily  artificial tears (preservative free) Ophthalmic Solution 1 Drop(s) Right EYE every 4 hours  doxazosin 8 milliGRAM(s) Oral at bedtime  erythromycin   Ointment 1 Application(s) Right EYE every 6 hours  ofloxacin 0.3% Solution 1 Drop(s) Right EYE every 6 hours  petrolatum Ophthalmic Ointment 1 Application(s) Both EYES two times a day  polyethylene glycol 3350 17 Gram(s) Oral two times a day  sodium zirconium cyclosilicate 5 Gram(s) Oral daily    IVF:  calcium acetate 667 milliGRAM(s) Oral three times a day with meals  sodium chloride 0.9%. 1000 milliLiter(s) IV Continuous <Continuous>    CULTURES:  Culture Results:   Commensal iram consistent with body site (11-11 @ 05:06)  Culture Results:   No growth (11-10 @ 17:21)    RADIOLOGY & ADDITIONAL TESTS:      ASSESSMENT:  45 y/o PMH ?stroke/MI present to [facility] after collapsing at work. Decorticate posturing, vomiting, intubated for airway protection. 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 Transferred to [facility] for further management. s/p trach 10/14. s/p peg 10/21.      PLAN:  Neuro:  - neuro/vitals q8  - pain control: tylenol prn  - CTH 9/30: enlarged pontine hemorrhage, CTH 10/3: read stable, CTH 10/25 anisocoric pupils (R sluggish 6mm compared to left 3mm briskly reactive); showing mostly resolved pontine hemorrhage, continued brainstem hypodensity likely edema d/t hemorrhage, no new hemorrhage or infarct, no herniation, mild increase in size of left lateral ventricle  - vEEG (10/3-4)- negative, (10/17-10/19) - negative  - stroke core measures, stroke neuro signed off  - MRI brain w/ w/o contrast 10/12: parenchymal hemorrhage, acute/subacute R cerebellar stroke      CV:  - -160  - HTN: amlodipine 10 mg qd  - echo (9/30) EF 75%    Resp:  - tolerating trach collar FiO2 70%, 10L   - duonebs/mucomyst q6h  - chest PT vest BID  - CTA chest 11/13 negative for PE, inc ground glass opacities    GI:  - Nepro TF via PEG (placed 10/21 by gen surg)  - bowel regimen, last BM 11/14  - CTAP 11/8 negative for infection      Renal:  - gabriel placed 10/29 d/t urinary retention - replaced 11/7  - FW flushes 250cc q6hrs   - IVF s/p CTA  - hyperK 10/20: lokelma 5mg qd  - hyperPhos: phoslo 667mg TID   - Urinary retenion: Cardura 8 mg   - CKD: trend BUN/Cr  - Na goal 135-145  - renal US 10/1: echogenicity c/w chronic med renal dz, repeat 10/8: inc renal echogeicity, c/f medical renal disease w increased hydro     Endo:  - A1c 5.4    Heme:  - DVT ppx: SCDs, SQH 5000u q8h   - LE dopplers negative 11/11    ID:  - febrile, last pancx 11/10  - pan cultured 11/3 (w/out UA) , (+) sputum for stenotrophomas maltophlia, blood cx (+) klebsiella, cefepime 2gq12 (11/6 - 11/12)   - ID following  - empiric tx: s/p zosyn (11/3-11/6) , s/p vanc  (11/3- 11/5)  - S/p Ancef (10/4-10/14) for PNA, and s/p Unasyn (10/18-10/23) +actinobacter baumanii    MISC:  - ophtho consult for keratitis, rec erythromycin ointment to rt eye q4hrs, ofloxacin ointment to rt eye QID, artificial tears to rt eye q2hrs, moisture chamber at bedtime    Dispo: regional, full code, pending placement and emergency medicaid     D/w Dr. D'Amico

## 2024-11-15 NOTE — PROGRESS NOTE ADULT - ASSESSMENT
44 YOF with unclear PMH (?stroke, MI) who was found down at work, intubated for airway protection and found to have acute parenchymal hemorrhage within nabil with mass effect (+ acute/subacute right cerebellar infarct) in setting of hypertensive emergency, transferred to [facility] for further neurosurgical care. Hospital course c/b poor neurologic recovery s/p trach-PEG, AUR s/p gabriel, ANIKA on CKD c/b hyperkalemia, HAP s/p amp-sulbactam (EOT 10/23), K aerogenes bacteremia.       # Gram-negative bacteremia.   # Fever ? LLL pneumonia   - Pt's blood cx on 11/3 positive K aerogenes  - Source of bacteremia suspected  however there is no urine cx data given chronic gabriel (s/p exchanged 11/7)  - Pt's surveillance blood cx sent on 11/6 was NGTD   - PEr ID note from 11/7 at least 7 days of IV cefepime from 11/6-11/13    - Sputum cx - Common Resp Commensals   - CTA Negative for PE , However with Evidence of Thick Secretions in Right Bronchus and Trachea   - Elevate HOB to 45 Degrees , Check Residuals , If residuals are elevated will need to decrease rate of Tube feeds or transition to bolus feeds     # Pontine hemorrhage.   -The pt was found to have acute parenchymal hemorrhage within nabil with mass effect (+ acute/subacute right cerebellar infarct) in setting of hypertensive emergency.   -The pt had an MRI brain also demonstrated acute/subacute R cerebellar stroke.   - Will continue management per NSGY    # Hypertension  -Will continue amlodipine 10mg daily     #Acute kidney injury superimposed on CKD.   -Pt s/p US renal with chronic medical renal disease. Hospital course c/b ATN.   - Will continue to renally dose medications and avoid nephrotoxins      #Hyperphosphatemia   -Pt  had hyperphosphatemia now on phoslo 776 mg TID now within normal limits will continue to monitor.     # Chronic respiratory failure.   -Pt s/p percutaneous trach by pulm on 10/14/24  - Will continue  trach collar   - Will continue routine trach care and suctioning PRN      # Neurogenic dysphagia.   -Pt s/pPEG with surgery 10/21/24  - TF per nutrition  - aspiration precautions and elevation of HOB.    #Acute urinary retention.   -Pt s/p gabriel placement  10/24 for urinary retention, failed TOV 10/27.   - doxazosin 8mg.    # Functional quadriplegia in  setting of brainstem hemorrhage  - decub precautions.    #DVT prop  -Continue Heparin SQ q8     #DISPO  -TBD - pending PRUCOL application    55 minutes spent on total encounter. The necessity of the time spent during the encounter on this date of service was due to:    Review of hospital course, labs, vitals, medical records.  Bedside exam   Discussed plan of care with primary team   Documenting the encounter.

## 2024-11-15 NOTE — PROGRESS NOTE ADULT - ASSESSMENT
43 y/o M with:  Large pontine hemorrhage, SAH, brain edema; ?locked-in  CVA  History MI  Acute on chronic CKD, hydronephrosis    PLAN:   NEURO:   Neurochecks Q4  VEEG - neg. DCed.  Analgesia prn  Activity: Bedrest    PULM: Acute hypoxemic respiratory failure  Aggressive pulm toilet. Duonebs/ mucomyst, 3%.  Vent: CPAP and wean to trach collar  ABG, CXR reviewed. Repeat ABG.   CTPE protocol 11/13. No PE. Atelectasiss    CARDIAC:   SBP goal 100-160mm Hg  Continue amlodipine  EF 75%    ENDO:   Blood sugar goals 140-180 mg/dL  A1C 5.4, ISS    GI: Mild transaminitis  S/P PEG  DIET: NPO for now  PPI while positive pressure ventilation.  LBM: 11/14    RENAL: Acute urinary retention, ANIKA on CKD  Stable creatinine  Na goal 135-145  Vuong for urine retention (replaced 11/7) Continue doxazosin  Continue phosphate binder and lokelma. Monitor lytes  Gentle IVF    HEME/ONC:    Hb stable  VTE prophylaxis: SCDs, SQH    ID: Gram negative bacteremia; Klebsiella  S/P cefepime 11/6- 11/12. ID followed.  Afebrile,  mild  leukocytosis  Cultures:   11/10: Sputum commensal, UA negative.  Blood cultures NGTD  11/3: Stenotrophomas  11/8 CT abd/ pelvis; atelectasis. No infectious process in abd or pelvis    MISC:    SOCIAL/FAMILY:  [x] awaiting [] updated at bedside [] family meeting    CODE STATUS:  [x] Full Code [] DNR [] DNI [] Palliative/Comfort Care    DISPOSITION:  [x] ICU [] Stroke Unit [] Floor [] EMU [] RCU [] PCU      Time spent: 60 critical care minutes

## 2024-11-16 LAB
ALBUMIN SERPL ELPH-MCNC: 3 G/DL — LOW (ref 3.3–5)
ALP SERPL-CCNC: 132 U/L — HIGH (ref 40–120)
ALT FLD-CCNC: 63 U/L — HIGH (ref 10–45)
ANION GAP SERPL CALC-SCNC: 9 MMOL/L — SIGNIFICANT CHANGE UP (ref 5–17)
AST SERPL-CCNC: 34 U/L — SIGNIFICANT CHANGE UP (ref 10–40)
BILIRUB SERPL-MCNC: 0.4 MG/DL — SIGNIFICANT CHANGE UP (ref 0.2–1.2)
BUN SERPL-MCNC: 64 MG/DL — HIGH (ref 7–23)
CALCIUM SERPL-MCNC: 8.7 MG/DL — SIGNIFICANT CHANGE UP (ref 8.4–10.5)
CHLORIDE SERPL-SCNC: 108 MMOL/L — SIGNIFICANT CHANGE UP (ref 96–108)
CO2 SERPL-SCNC: 19 MMOL/L — LOW (ref 22–31)
CREAT SERPL-MCNC: 2.21 MG/DL — HIGH (ref 0.5–1.3)
CULTURE RESULTS: SIGNIFICANT CHANGE UP
EGFR: 36 ML/MIN/1.73M2 — LOW
EGFR: 36 ML/MIN/1.73M2 — LOW
GLUCOSE SERPL-MCNC: 102 MG/DL — HIGH (ref 70–99)
GRAM STN FLD: ABNORMAL
HCT VFR BLD CALC: 32.4 % — LOW (ref 39–50)
HGB BLD-MCNC: 10.3 G/DL — LOW (ref 13–17)
MAGNESIUM SERPL-MCNC: 2.4 MG/DL — SIGNIFICANT CHANGE UP (ref 1.6–2.6)
MCHC RBC-ENTMCNC: 28.8 PG — SIGNIFICANT CHANGE UP (ref 27–34)
MCHC RBC-ENTMCNC: 31.8 G/DL — LOW (ref 32–36)
MCV RBC AUTO: 90.5 FL — SIGNIFICANT CHANGE UP (ref 80–100)
MRSA PCR RESULT.: NEGATIVE — SIGNIFICANT CHANGE UP
NRBC # BLD: 0 /100 WBCS — SIGNIFICANT CHANGE UP (ref 0–0)
NRBC BLD-RTO: 0 /100 WBCS — SIGNIFICANT CHANGE UP (ref 0–0)
PHOSPHATE SERPL-MCNC: 4.4 MG/DL — SIGNIFICANT CHANGE UP (ref 2.5–4.5)
PLATELET # BLD AUTO: 186 K/UL — SIGNIFICANT CHANGE UP (ref 150–400)
POTASSIUM SERPL-MCNC: 4.8 MMOL/L — SIGNIFICANT CHANGE UP (ref 3.5–5.3)
POTASSIUM SERPL-SCNC: 4.8 MMOL/L — SIGNIFICANT CHANGE UP (ref 3.5–5.3)
PROCALCITONIN SERPL-MCNC: 0.35 NG/ML — HIGH (ref 0.02–0.1)
PROT SERPL-MCNC: 6.7 G/DL — SIGNIFICANT CHANGE UP (ref 6–8.3)
RBC # BLD: 3.58 M/UL — LOW (ref 4.2–5.8)
RBC # FLD: 14.4 % — SIGNIFICANT CHANGE UP (ref 10.3–14.5)
S AUREUS DNA NOSE QL NAA+PROBE: NEGATIVE — SIGNIFICANT CHANGE UP
SODIUM SERPL-SCNC: 136 MMOL/L — SIGNIFICANT CHANGE UP (ref 135–145)
SPECIMEN SOURCE: SIGNIFICANT CHANGE UP
SPECIMEN SOURCE: SIGNIFICANT CHANGE UP
WBC # BLD: 11.51 K/UL — HIGH (ref 3.8–10.5)
WBC # FLD AUTO: 11.51 K/UL — HIGH (ref 3.8–10.5)

## 2024-11-16 PROCEDURE — 99291 CRITICAL CARE FIRST HOUR: CPT

## 2024-11-16 PROCEDURE — 99232 SBSQ HOSP IP/OBS MODERATE 35: CPT

## 2024-11-16 RX ORDER — VANCOMYCIN HCL IN 5 % DEXTROSE 1.5G/250ML
1000 PLASTIC BAG, INJECTION (ML) INTRAVENOUS ONCE
Refills: 0 | Status: DISCONTINUED | OUTPATIENT
Start: 2024-11-16 | End: 2024-11-16

## 2024-11-16 RX ORDER — CEFEPIME 2 G/20ML
2000 INJECTION, POWDER, FOR SOLUTION INTRAVENOUS EVERY 12 HOURS
Refills: 0 | Status: DISCONTINUED | OUTPATIENT
Start: 2024-11-17 | End: 2024-11-17

## 2024-11-16 RX ORDER — VANCOMYCIN HCL IN 5 % DEXTROSE 1.5G/250ML
PLASTIC BAG, INJECTION (ML) INTRAVENOUS
Refills: 0 | Status: DISCONTINUED | OUTPATIENT
Start: 2024-11-16 | End: 2024-11-16

## 2024-11-16 RX ORDER — CEFEPIME 2 G/20ML
2000 INJECTION, POWDER, FOR SOLUTION INTRAVENOUS EVERY 12 HOURS
Refills: 0 | Status: COMPLETED | OUTPATIENT
Start: 2024-11-16 | End: 2024-11-17

## 2024-11-16 RX ORDER — INSULIN LISPRO 100 U/ML
INJECTION, SOLUTION INTRAVENOUS; SUBCUTANEOUS EVERY 6 HOURS
Refills: 0 | Status: DISCONTINUED | OUTPATIENT
Start: 2024-11-16 | End: 2024-11-25

## 2024-11-16 RX ORDER — CEFEPIME 2 G/20ML
INJECTION, POWDER, FOR SOLUTION INTRAVENOUS
Refills: 0 | Status: DISCONTINUED | OUTPATIENT
Start: 2024-11-16 | End: 2024-11-16

## 2024-11-16 RX ORDER — FENTANYL CITRATE-0.9 % NACL/PF 100MCG/2ML
12.5 SYRINGE (ML) INTRAVENOUS ONCE
Refills: 0 | Status: DISCONTINUED | OUTPATIENT
Start: 2024-11-16 | End: 2024-11-16

## 2024-11-16 RX ADMIN — Medication 1000 MILLILITER(S): at 02:25

## 2024-11-16 RX ADMIN — DOXAZOSIN MESYLATE 8 MILLIGRAM(S): 8 TABLET ORAL at 22:08

## 2024-11-16 RX ADMIN — OFLOXACIN 1 DROP(S): 3 SOLUTION OPHTHALMIC at 06:55

## 2024-11-16 RX ADMIN — Medication 1 APPLICATION(S): at 06:55

## 2024-11-16 RX ADMIN — POLYETHYLENE GLYCOL 3350 17 GRAM(S): 17 POWDER, FOR SOLUTION ORAL at 18:03

## 2024-11-16 RX ADMIN — Medication 40 MILLIGRAM(S): at 12:04

## 2024-11-16 RX ADMIN — OFLOXACIN 1 DROP(S): 3 SOLUTION OPHTHALMIC at 18:05

## 2024-11-16 RX ADMIN — Medication 4 MILLILITER(S): at 05:55

## 2024-11-16 RX ADMIN — IPRATROPIUM BROMIDE AND ALBUTEROL SULFATE 3 MILLILITER(S): .5; 2.5 SOLUTION RESPIRATORY (INHALATION) at 17:00

## 2024-11-16 RX ADMIN — OFLOXACIN 1 DROP(S): 3 SOLUTION OPHTHALMIC at 12:05

## 2024-11-16 RX ADMIN — Medication 667 MILLIGRAM(S): at 18:03

## 2024-11-16 RX ADMIN — OFLOXACIN 1 DROP(S): 3 SOLUTION OPHTHALMIC at 00:47

## 2024-11-16 RX ADMIN — SODIUM ZIRCONIUM CYCLOSILICATE 5 GRAM(S): 5 POWDER, FOR SUSPENSION ORAL at 12:04

## 2024-11-16 RX ADMIN — Medication 15 MILLILITER(S): at 06:53

## 2024-11-16 RX ADMIN — CEFEPIME 100 MILLIGRAM(S): 2 INJECTION, POWDER, FOR SOLUTION INTRAVENOUS at 13:20

## 2024-11-16 RX ADMIN — ERYTHROMYCIN 1 APPLICATION(S): 5 OINTMENT OPHTHALMIC at 06:55

## 2024-11-16 RX ADMIN — Medication 667 MILLIGRAM(S): at 14:20

## 2024-11-16 RX ADMIN — Medication 1 DROP(S): at 06:55

## 2024-11-16 RX ADMIN — IPRATROPIUM BROMIDE AND ALBUTEROL SULFATE 3 MILLILITER(S): .5; 2.5 SOLUTION RESPIRATORY (INHALATION) at 01:10

## 2024-11-16 RX ADMIN — Medication 667 MILLIGRAM(S): at 09:29

## 2024-11-16 RX ADMIN — Medication 75 MILLILITER(S): at 02:31

## 2024-11-16 RX ADMIN — Medication 650 MILLIGRAM(S): at 19:34

## 2024-11-16 RX ADMIN — ERYTHROMYCIN 1 APPLICATION(S): 5 OINTMENT OPHTHALMIC at 18:04

## 2024-11-16 RX ADMIN — IPRATROPIUM BROMIDE AND ALBUTEROL SULFATE 3 MILLILITER(S): .5; 2.5 SOLUTION RESPIRATORY (INHALATION) at 09:13

## 2024-11-16 RX ADMIN — Medication 1 DROP(S): at 22:10

## 2024-11-16 RX ADMIN — ACETYLCYSTEINE 4 MILLILITER(S): 200 INHALANT RESPIRATORY (INHALATION) at 01:10

## 2024-11-16 RX ADMIN — Medication 1 DROP(S): at 14:21

## 2024-11-16 RX ADMIN — Medication 12.5 MICROGRAM(S): at 01:31

## 2024-11-16 RX ADMIN — ERYTHROMYCIN 1 APPLICATION(S): 5 OINTMENT OPHTHALMIC at 00:47

## 2024-11-16 RX ADMIN — IPRATROPIUM BROMIDE AND ALBUTEROL SULFATE 3 MILLILITER(S): .5; 2.5 SOLUTION RESPIRATORY (INHALATION) at 22:03

## 2024-11-16 RX ADMIN — IPRATROPIUM BROMIDE AND ALBUTEROL SULFATE 3 MILLILITER(S): .5; 2.5 SOLUTION RESPIRATORY (INHALATION) at 05:54

## 2024-11-16 RX ADMIN — Medication 650 MILLIGRAM(S): at 19:15

## 2024-11-16 RX ADMIN — Medication 12.5 MICROGRAM(S): at 01:16

## 2024-11-16 RX ADMIN — POLYETHYLENE GLYCOL 3350 17 GRAM(S): 17 POWDER, FOR SOLUTION ORAL at 07:08

## 2024-11-16 RX ADMIN — Medication 4 MILLILITER(S): at 01:10

## 2024-11-16 RX ADMIN — ACETYLCYSTEINE 4 MILLILITER(S): 200 INHALANT RESPIRATORY (INHALATION) at 13:03

## 2024-11-16 RX ADMIN — Medication 1 APPLICATION(S): at 18:06

## 2024-11-16 RX ADMIN — HEPARIN SODIUM 5000 UNIT(S): 1000 INJECTION INTRAVENOUS; SUBCUTANEOUS at 22:08

## 2024-11-16 RX ADMIN — ACETYLCYSTEINE 4 MILLILITER(S): 200 INHALANT RESPIRATORY (INHALATION) at 22:03

## 2024-11-16 RX ADMIN — HEPARIN SODIUM 5000 UNIT(S): 1000 INJECTION INTRAVENOUS; SUBCUTANEOUS at 14:20

## 2024-11-16 RX ADMIN — IPRATROPIUM BROMIDE AND ALBUTEROL SULFATE 3 MILLILITER(S): .5; 2.5 SOLUTION RESPIRATORY (INHALATION) at 13:03

## 2024-11-16 RX ADMIN — ERYTHROMYCIN 1 APPLICATION(S): 5 OINTMENT OPHTHALMIC at 12:05

## 2024-11-16 RX ADMIN — Medication 4 MILLILITER(S): at 09:13

## 2024-11-16 RX ADMIN — ACETYLCYSTEINE 4 MILLILITER(S): 200 INHALANT RESPIRATORY (INHALATION) at 05:54

## 2024-11-16 RX ADMIN — Medication 1 DROP(S): at 02:30

## 2024-11-16 RX ADMIN — Medication 15 MILLILITER(S): at 18:07

## 2024-11-16 RX ADMIN — HEPARIN SODIUM 5000 UNIT(S): 1000 INJECTION INTRAVENOUS; SUBCUTANEOUS at 06:58

## 2024-11-16 RX ADMIN — ACETYLCYSTEINE 4 MILLILITER(S): 200 INHALANT RESPIRATORY (INHALATION) at 09:14

## 2024-11-16 RX ADMIN — Medication 1 DROP(S): at 09:30

## 2024-11-16 RX ADMIN — Medication 1 DROP(S): at 18:05

## 2024-11-16 RX ADMIN — ACETYLCYSTEINE 4 MILLILITER(S): 200 INHALANT RESPIRATORY (INHALATION) at 17:00

## 2024-11-16 RX ADMIN — AMLODIPINE BESYLATE 10 MILLIGRAM(S): 10 TABLET ORAL at 06:53

## 2024-11-16 RX ADMIN — IPRATROPIUM BROMIDE AND ALBUTEROL SULFATE 3 MILLILITER(S): .5; 2.5 SOLUTION RESPIRATORY (INHALATION) at 00:03

## 2024-11-16 NOTE — PROGRESS NOTE ADULT - ASSESSMENT
43 y/o M with:  Large pontine hemorrhage, SAH, brain edema; ?locked-in  CVA  Acute hypoxemic respiratory failure  History MI  Acute on chronic CKD, hydronephrosis      PLAN:   NEURO:   Neurochecks Q4  VEEG - neg.   Analgesia prn  Activity: PT/OT as tolerated    PULM: Acute hypoxemic respiratory failure  Aggressive pulm toilet. Duonebs/ mucomyst, 3%.  Vent: CPAP and wean to trach collar  ABG, CXR reviewed.   CTPE protocol 11/13. No PE or significant consolidation. Atelectasis    CARDIAC:   SBP goal 100-160mm Hg  Continue amlodipine  EF 75%    ENDO:   Blood sugar goals 140-180 mg/dL  A1C 5.4, ISS    GI: Mild transaminitis  S/P PEG  DIET: TFs  PPI while positive pressure ventilation.  LBM: 11/14    RENAL: Acute urinary retention, ANIKA on CKD  Stable creatinine  Na goal 135-145  Vuong for urine retention (replaced 11/7) Continue doxazosin  Continue phosphate binder and lokelma. Monitor lytes  Gentle IVF. IVL once at goal    HEME/ONC:    Hb stable  VTE prophylaxis: SCDs, SQH    ID: Gram negative bacteremia; Klebsiella; resolved.  S/P cefepime 11/6- 11/12. ID followed.  Afebrile,  mild  leukocytosis  Cultures:  11/15:    11/10: Sputum commensal, UA negative.. 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 No infectious process in abd or pelvis  Abx: Cefepime resume 11/16    MISC:    SOCIAL/FAMILY:  [x] awaiting [] updated at bedside [] family meeting    CODE STATUS:  [x] Full Code [] DNR [] DNI [] Palliative/Comfort Care    DISPOSITION:  [x] ICU [] Stroke Unit [] Floor [] EMU [] RCU [] PCU      Time spent: 45 critical care minutes     45 y/o M with:  Large pontine hemorrhage, SAH, brain edema; ?locked-in  CVA  Acute hypoxemic respiratory failure  History MI  Acute on chronic CKD, hydronephrosis      PLAN:   NEURO:   Neurochecks Q4  VEEG - neg.   Analgesia prn  Activity: PT/OT as tolerated    PULM: Acute hypoxemic respiratory failure ? mucous plugging  Aggressive pulm toilet. Duonebs/ mucomyst, 3%.  Vent: CPAP and wean to trach collar  ABG, CXR reviewed.   CTPE protocol 11/13. No PE or significant consolidation. Atelectasis    CARDIAC:   SBP goal 100-160mm Hg  Continue amlodipine  EF 75%    ENDO:   Blood sugar goals 140-180 mg/dL  A1C 5.4, ISS    GI: Mild transaminitis  S/P PEG  DIET: TFs  PPI while positive pressure ventilation.  LBM: 11/14    RENAL: Acute urinary retention, ANIKA on CKD  Stable creatinine  Na goal 135-145  Vuong for urine retention (replaced 11/7) Continue doxazosin  Continue phosphate binder and lokelma. Monitor lytes  Gentle IVF. IVL once at goal    HEME/ONC:    Hb stable  VTE prophylaxis: SCDs, SQH    ID: Gram negative bacteremia; Klebsiella; resolved.  S/P cefepime 11/6- 11/12. ID followed.  Afebrile,  mild  leukocytosis  Cultures:  11/15:    11/10: Sputum commensal, UA negative.. 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 No infectious process in abd or pelvis  Abx: Cefepime resume 11/16    MISC:    SOCIAL/FAMILY:  [x] awaiting [] updated at bedside [] family meeting    CODE STATUS:  [x] Full Code [] DNR [] DNI [] Palliative/Comfort Care    DISPOSITION:  [x] ICU [] Stroke Unit [] Floor [] EMU [] RCU [] PCU      Time spent: 45 critical care minutes     45 y/o M with:  Large pontine hemorrhage, SAH, brain edema; ?locked-in  CVA  Acute hypoxemic respiratory failure  History MI  Acute on chronic CKD, hydronephrosis      PLAN:   NEURO:   Neurochecks Q4  VEEG - neg.   Analgesia prn  Activity: PT/OT as tolerated    PULM: Acute hypoxemic respiratory failure ? mucous plugging  Aggressive pulm toilet. Duonebs/ mucomyst, 3%.  Vent: CPAP and wean to trach collar  ABG, CXR reviewed.   CTPE protocol 11/13. No PE or significant consolidation. Atelectasis.    CARDIAC:   SBP goal 100-160mm Hg  Continue amlodipine  EF 75%    ENDO:   Blood sugar goals 140-180 mg/dL  A1C 5.4, ISS    GI: Mild transaminitis  S/P PEG  DIET: TFs  Continue PPI.  LBM: 11/14    RENAL: Acute urinary retention, ANIKA on CKD  Stable creatinine  Na goal 135-145  Vuong for urine retention (replaced 11/7) Continue doxazosin  Continue phosphate binder and lokelma. Monitor lytes  Gentle IVF. IVL once at goal    HEME/ONC:    Hb stable  VTE prophylaxis: SCDs, SQH    ID: Gram negative bacteremia; Klebsiella; resolved.  S/P cefepime 11/6- 11/12. ID followed.  Afebrile,  mild  leukocytosis  Cultures:  11/15:  Sputum- GNR. Follow speciation  11/10: Sputum commensal, UA negative.. 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 No infectious process in abd or pelvis  Abx: Cefepime resumed on 11/16. S/P vancomycin. MRSA negative    MISC:    SOCIAL/FAMILY:  [x] awaiting [] updated at bedside [] family meeting    CODE STATUS:  [x] Full Code [] DNR [] DNI [] Palliative/Comfort Care    DISPOSITION:  [x] ICU [] Stroke Unit [] Floor [] EMU [] RCU [] PCU      Time spent: 45 critical care minutes     45 y/o M with:  Large pontine hemorrhage, SAH, brain edema; ?locked-in  CVA  Acute hypoxemic respiratory failure  History MI  Acute on chronic CKD, hydronephrosis      PLAN:   NEURO:   Neurochecks Q4  VEEG - neg.   Analgesia prn  Activity: PT/OT as tolerated    PULM: Acute hypoxemic respiratory failure ? mucous plugging  Aggressive pulm toilet. Duonebs/ mucomyst, 3%.  Vent: CPAP and wean to trach collar  ABG, CXR reviewed.   CTPE protocol 11/13. No PE or significant consolidation. Atelectasis.    CARDIAC:   SBP goal 100-160mm Hg  Continue amlodipine  EF 75%    ENDO:   Blood sugar goals 140-180 mg/dL  A1C 5.4, ISS    GI: Mild transaminitis  S/P PEG  DIET: TFs  Continue PPI.  LBM: 11/14    RENAL: Acute urinary retention, ANIKA on CKD  Stable creatinine  Na goal 135-145  Vuong for urine retention (replaced 11/7) Continue doxazosin  Continue phosphate binder and lokelma. Monitor lytes  Gentle IVF. IVL once at goal    HEME/ONC:    Monitor H/H  VTE prophylaxis: SCDs, SQH    ID: Gram negative bacteremia; Klebsiella; resolved.  S/P cefepime 11/6- 11/12. ID followed.  Afebrile,  mild  leukocytosis  Cultures:  11/15:  Sputum- GNR. Follow speciation  11/10: Sputum commensal, UA negative.. 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 No infectious process in abd or pelvis  Abx: Cefepime resumed on 11/16. S/P vancomycin. MRSA negative    MISC:    SOCIAL/FAMILY:  [x] awaiting [] updated at bedside [] family meeting    CODE STATUS:  [x] Full Code [] DNR [] DNI [] Palliative/Comfort Care    DISPOSITION:  [x] ICU [] Stroke Unit [] Floor [] EMU [] RCU [] PCU      Time spent: 45 critical care minutes

## 2024-11-16 NOTE — PROGRESS NOTE ADULT - SUBJECTIVE AND OBJECTIVE BOX
S/Overnight events:  BD 47. POCUS showed collapsable IVCF, given 1L bolus.     Hospital Course:   9/30: transferred from [facility]. A line placed. Versed dc'd. Cy Rader at bedside, states pt has family in [place], cannot confirm medications or PMH other than stroke and MI. 250cc bolus 3% given. LR switched to NS. hydralazine 25q8 started, 3% started, switched propofol to precedex   10/1: stability CTH done. Added labetalol, started TF. Palliative consulted. ethics consulted to determine surrogate. febrile 103, pan cx sent  10/2: BD 2, GEORGE overnight. TF resumed. Desatt'd to 80s, FiO2 inc. to 50. Fentanyl given, ABG, CXR ordered. Maxxed on precedex, started on propofol for DARIEN -4 - -5. Precedex dc'd. Duonebs, mucomyst, hypertonic added. 3% dc'd. Cardene dc'd. Start vanc/CTX. Increased labetalol 200q8. MRSA negative, dc'd vanc. ETT pulled back 2cm x 2, good positioning after confirmatory chest xray. Ethics attempting to establish HCP with family. Na 159, starting FW 250q6 for range 150-155.   10/3: BD3, GEORGE o/n, neuro stable. Na elevating, FW increased to 300q6. Dc'd bowel reg for diarrhea. vEEG started. SQH 5000q8 tonight.   10/4: BD 4, albumn bolus, incr. LR to 80 2/2 incr. in Cr, LR to 100cc/hr for uptrending Cr. Started 7% hypersal for 48hrs and SL atropine for inline/oral thick secretions. Dc'd CTX and started ancef for MSSA in the sputum. Nephrology consulted for CKD, f/u recs. SBP 170s, given hydralazine 10mg IVP.   10/5: BD5, o/n 10mg IVP hydralazine given for SBP 170s and started on hydralazine 25q8 via OGT. 10mg IV push labetalol for SBP > 160s. RT placed for diarrhea.   10/6: BD6, o/n FW increased to 350q4 per nephrology recs. IV tylenol for temp 100.6, SBp 160s presumed uncomfortable.   10/7: BD7, overnight pancultured for temp 101.8F.   10/8: BD8. GEORGE. Cr bumped. decreased LR to 75cc/hr. Adding simethicone ATC. incr hydralazine 50mgTID. Incr labetalol 300mgTID. Na 145, decreased FWF to 250q6. Start precedex. FENa consistent with intrinsic kidney injury. Pend repeat renal US. Retaining up to 1.3L, bladder scans q6, straight cath PRN  10/9: BD 9. GEORGE overnight. Neuro stable. abd xray for distention w non-specific gas pattern, OGT to LIWS for morning. duonebs/mucomyst to q8 for improving secretions. Changed tube feeds to Jevity 1.5 20cc/hr, low rate due to abdominal distention, nepro dense and more difficult to digest. Tolerating CPAP, confirmed by ABG.   10/10: BD 10. GEORGE overnight. Neuro stable. (+) gabriel for urinary retention on bladder scan. inc TF to goal rate of 40cc/hr. family leaning toward pursuing trach/PEG. 1/2 amp for FS 81.   10/11: BD 11. GEORGE overnight. Neuro stable. Trach/PEG consults placed.   10/12: BD 12. GEORGE overnight. Neuro stable. MRI brain complete.   10/13: BD 13. Increase flomax. Hold SQH after PM dose for trach tm. IVL.   10/14: BD 14. GEORGE overnight, remains on AC/VC. Gabriel placed for urinary retention. Dc'd free water.  S/p trach with pulm. NGT placed and CXR confirmed in good position.   10/15: BD 15, GEORGE ovn. resumed feeds. spiked 101, pan cx sent.   10/16: BD 16. GEORGE ovn. Lokelma 5mg for K+ 5.4. Started vanc q 24/zosyn for empiric PNA coverage, IVF to 100/hr. PEG held for fever.   10/17: BD 17,  ordered serum osm and urine osm for am. Started sinemet for neurostimulation. Increased cardura to 0.8. Started FW 100q4, dc'd IVF. MRSA negative, dc'd vanc. NGT replaced d/t coiling.   10/18: BD 18, GEORGE overnight, neuro stable. Amantadine added for neurostim. zosyn changed to unasyn for acinetobacter baumannii, failed TOV and required SC  10/19: BD 19, GEORGE ovn. cardura 2mg added for retention. labetalol decreased 200q8, hydralazine decreased 25q8. Gabriel replaced.   10/20: BD20, GEORGE overnight. NGT dislodged, replaced. PEG tomorrow w/ gen surg, FW increased to 150q4 and labetalol decreased to 100q8, lokelma given for hyperkalemia.   10/21: BD 21. POD0 PEG placement with Gen surg. decr labetolol to 50q8, incr. cardura to 0.4, started lokelma and phoslo, dc gabriel POD0 PEG placement with Gen surg.  10/22: BD 22. Plan to start TF today via PEG. dc labetalol, Following ophtho recs. Increased apnea settings - found to be in cheyne-moe respiration. CPAP 5/5.  10/23: BD 23. hydralazine d/c'd, trach collar trial today. Rectal tube placed at 6am.  10/24: BD24, o/n lokelma held due to diarrhea. Free water 100q6 resumed. dc'd tamsulosin, amantadine. Incr'd cardura to 8mg qhs. Dc'd FW. Switched jevity to nepro. gabriel placed for high urine output. Started SL atropine for oral secretions. Dc'd free water.  10/25: BD25, o/n decreased suctioning requirements to > q4hrs, GEORGE. Cr improving, cont phoslo, lokelma held at this time. Gabriel placed yest, cont. Tolerating trach collar. Given 500cc plasmalyte bolus for ANIKA. Dc'd sinemet.   10/26: BD26, o/n resumed lokelma 5mg daily and resumed 100cc free water q6hrs. Change in neuro status with new right pupillary dilation with anisocoria (right pupil 6mm fixed and left pupil 3mm briskly reactive). Given 23.4% NaCl bullet, taken for emergent CTH showing mostly resolved pontine hemorrhage, continued brainstem hypodensity likely edema d/t hemorrhage, no new hemorrhage or infarct, no herniation, mild increase in size of left lateral ventricle. Vitals remaine stable. Na goal > 140.   10/27: BD27, o/n GEORGE.Neuro stable. Pend stepdown with airway bed.   10/28: BD 28. GEORGE overnight. Neuro stable. Miralax ordered. Gabriel removed, pending TOV.  10/29: BD 29. GEORGE o/n. Given 2L NS over 8 hrs for increased BUN/Cr ratio. Gabriel placed for frequent straight cath.   10/30: BD 30.   10/31: BD 31. GEORGE overnight. Na 149, increased free water to 200q6. 1L NS for uptrending BUN.   11/1: BD 32. GEORGE overnight. Given 1L NS for dehydration. Na 146, increased FW to 250q6.   11/2: BD 33 GEORGE overnight, neuro stable, given 500cc bolus for net negative status and tachycardia   11/3: BD 34, GEORGE overnight, neuro stable. Patient remains tachycardic, EKG showing sinus tachycardia, given additional 500cc NS bolus. Febrile to 101.9F, pan cultured (without UA), CXR WNL, given tylenol.   11/4: BD 35, GEORGE overnight, neuro stable. Given 1L NS for tachycardia. sputum (+) for stenotropohomas maltophilia.   11/5: BD 36 GEORGE overnight, neuro stable. Vancomycin dc'd. Chest PT BID. ID consulted, cont zosyn.  11/6: BD 37. blood cx + klebsiella dc zosyn changed to cefepime, CTAP ordered, rpt blood cx sent.    11/7: BD 38. Pending CT A/P, given 250cc bolus and starting maintenance fluids overnight. Pending CT A/P after bolus   11/8: BD 39. CT CAP negative for infection.   11/9: BD 40. GEORGE overnight.  11/10: BD 41. GEORGE overnight. desat to 85 on trach collar, O2 inc to 10L and 100%, O2 sat inc to 95. pt tachy to 110s, euvolemic. given tylenol. ABG and CXR ordered. spiked fever, pancultured, RVP negative. AM ABG w pO2 79, rpt w pO2 79. pt appears comfortable, satting 94%.   11/11: BD 42. GEORGE overnight. pt became tachy to 130s, desat to 90 on 100% FiO2 and 10L. suctioned, (+) productive cough. temp 101.4, given 1g IV tylenol and 500cc NS bolus for euvolemia. fever and HR downtrending. LE dopplers negative for dvt  11/12: BD 43, GEORGE ovn, fever and HR downtrending, satting 97% 70% FIO2  11/13: BD 44, GEORGE ovn. started standing tylenol x24 hours for tachycardia. desat to 80s, o2 increased. CXR stable, pending CTA PE protocol.   11/14: BD 45, GEORGE overnight, neuro stable. resp therapy dec FiO2 to 70%.   11/15: BD 46, GEORGE overnight, neuro stable.  Rapid called for desaturation 30s, tachycardic 140s. Patient bagged, 100% fio2, heavily suctioned. CXR/POCUS unremarkable. ABG c/w desaturation. WBC 14.71. Afebrile. O2 improved to 90s and patient upgraded to ICU. ABG paO2 30s improved to 89 on vent. IV Tylenol x 1, sputum sent. Start protonix while on vent.       Vital Signs Last 24 Hrs  T(C): 37.9 (16 Nov 2024 02:00), Max: 38.1 (16 Nov 2024 00:29)  T(F): 100.2 (16 Nov 2024 02:00), Max: 100.6 (16 Nov 2024 00:29)  HR: 124 (16 Nov 2024 02:00) (67 - 124)  BP: 115/81 (16 Nov 2024 02:00) (107/74 - 143/97)  BP(mean): 91 (16 Nov 2024 02:00) (91 - 118)  RR: 16 (16 Nov 2024 02:00) (16 - 18)  SpO2: 98% (16 Nov 2024 02:00) (95% - 100%)    Parameters below as of 16 Nov 2024 02:00  Patient On (Oxygen Delivery Method): ventilator    O2 Concentration (%): 60    I&O's Detail    14 Nov 2024 07:01  -  15 Nov 2024 07:00  --------------------------------------------------------  IN:    Free Water: 500 mL    Nepro with Carb Steady: 600 mL    sodium chloride 0.9%: 1600 mL  Total IN: 2700 mL    OUT:    Indwelling Catheter - Urethral (mL): 1225 mL    Intermittent Catheterization - Urethral (mL): 800 mL  Total OUT: 2025 mL    Total NET: 675 mL      15 Nov 2024 07:01  -  16 Nov 2024 03:42  --------------------------------------------------------  IN:    Enteral Tube Flush: 120 mL    Free Water: 250 mL    Nepro with Carb Steady: 100 mL    sodium chloride 0.9%: 400 mL    sodium chloride 0.9%: 375 mL    Sodium Chloride 0.9% Bolus: 1225 mL  Total IN: 2470 mL    OUT:    Indwelling Catheter - Urethral (mL): 230 mL    Oral Fluid: 0 mL    Voided (mL): 800 mL  Total OUT: 1030 mL    Total NET: 1440 mL      I&O's Summary    14 Nov 2024 07:01  -  15 Nov 2024 07:00  --------------------------------------------------------  IN: 2700 mL / OUT: 2025 mL / NET: 675 mL    15 Nov 2024 07:01  -  16 Nov 2024 03:42  --------------------------------------------------------  IN: 2470 mL / OUT: 1030 mL / NET: 1440 mL    PHYSICAL EXAM:  Constitutional: Pt found in bed in NAD   ENT: PERRL, vertical EOMi, tracks vertically, R 3mm brisk, L 3mm brisk   Respiratory: +trach to vent, breathing non-labored, symmetrical chest wall movement  Cardiovascuar: RRR, no murmurs  Gastrointestinal: +PEG, abdomen soft, non tender  Neurological:  AAOX0. Intermittently follows commands   Motor: RUE bicep 0/5, tricep 4/5, HG 3/5, RLE wiggles toes to command, LUE extends to noxious, LLE withdraws from noxious  Sensation: unable to assess   Pronator Drift: unable to assess    DIET:  [X] NPO  [] Mechanical  [] Tube feeds    LABS:  PT/INR - ( 15 Nov 2024 20:36 )   PT: 12.3 sec;   INR: 1.07          PTT - ( 15 Nov 2024 20:36 )  PTT:31.2 sec  Urinalysis Basic - ( 15 Nov 2024 20:36 )    Color: x / Appearance: x / SG: x / pH: x  Gluc: 113 mg/dL / Ketone: x  / Bili: x / Urobili: x   Blood: x / Protein: x / Nitrite: x   Leuk Esterase: x / RBC: x / WBC x   Sq Epi: x / Non Sq Epi: x / Bacteria: x    CAPILLARY BLOOD GLUCOSE      POCT Blood Glucose.: 106 mg/dL (15 Nov 2024 20:21)    Allergies    Allergy Status Unknown    Intolerances      MEDICATIONS:  Antibiotics:    Neuro:  acetaminophen   Oral Liquid .. 650 milliGRAM(s) Oral every 6 hours PRN    Anticoagulation:  heparin   Injectable 5000 Unit(s) SubCutaneous every 8 hours    OTHER:  acetylcysteine 10%  Inhalation 4 milliLiter(s) Inhalation every 4 hours  albuterol/ipratropium for Nebulization 3 milliLiter(s) Nebulizer every 4 hours  amLODIPine   Tablet 10 milliGRAM(s) Oral daily  artificial tears (preservative free) Ophthalmic Solution 1 Drop(s) Right EYE every 4 hours  chlorhexidine 0.12% Liquid 15 milliLiter(s) Oral Mucosa every 12 hours  doxazosin 8 milliGRAM(s) Oral at bedtime  erythromycin   Ointment 1 Application(s) Right EYE every 6 hours  insulin lispro (ADMELOG) corrective regimen sliding scale   SubCutaneous every 6 hours  ofloxacin 0.3% Solution 1 Drop(s) Right EYE every 6 hours  pantoprazole   Suspension 40 milliGRAM(s) Oral daily  petrolatum Ophthalmic Ointment 1 Application(s) Both EYES two times a day  polyethylene glycol 3350 17 Gram(s) Oral two times a day  sodium chloride 3%  Inhalation 4 milliLiter(s) Inhalation every 4 hours  sodium zirconium cyclosilicate 5 Gram(s) Oral daily    IVF:  calcium acetate 667 milliGRAM(s) Oral three times a day with meals  sodium chloride 0.9%. 1000 milliLiter(s) IV Continuous <Continuous>    CULTURES:  Culture Results:   Commensal iram consistent with body site (11-11 @ 05:06)  Culture Results:   No growth (11-10 @ 17:21)    ASSESSMENT:  45 y/o PMH ?stroke/MI present to [facility] after collapsing at work. Decorticate posturing, vomiting, intubated for airway protection. 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 Transferred to [facility] for further management. s/p trach 10/14. s/p peg 10/21. Reupgrade to ICU 2/2 desaturation event and suctioning requirements 11/15.     AMS    Handoff    MEWS Score    Acute myocardial infarction    CVA (cerebral vascular accident)    Intracerebral hemorrhage of brain stem    Brainstem stroke    Brain stem stroke syndrome    Brain stem hemorrhage    Brain stem stroke syndrome    Hemorrhagic stroke    Brainstem stroke    Encephalopathy acute    Functional quadriplegia    Advanced care planning/counseling discussion    Encounter for palliative care    Pontine hemorrhage    Neurogenic dysphagia    Chronic respiratory failure    Acute kidney injury superimposed on CKD    Acute urinary retention    Hypertensive emergency    Sepsis, unspecified organism    Sepsis    Gram-negative bacteremia    Percutaneous tracheal puncture    Altered mental status examination    EGD, with PEG    AMS    [email or web address]    PLAN:  Neuro:  - neuro q4/vitals q1  - pain control: tylenol prn  - CTH 9/30: enlarged pontine hemorrhage, CTH 10/3: read stable, CTH 10/25 anisocoric pupils (R sluggish 6mm compared to left 3mm briskly reactive); showing mostly resolved pontine hemorrhage, continued brainstem hypodensity likely edema d/t hemorrhage, no new hemorrhage or infarct, no herniation, mild increase in size of left lateral ventricle  - vEEG (10/3-4)- negative, (10/17-10/19) - negative  - stroke core measures, stroke neuro signed off  - MRI brain w/ w/o contrast 10/12: parenchymal hemorrhage, acute/subacute R cerebellar stroke      CV:  - -160  - HTN: amlodipine 10 mg qd  - echo (9/30) EF 75%    Resp:  - Trach to vent 430/60/14/8  - Secretions: duonebs/mucomyst/3% inhaltation q4h  - chest PT q4  - CTA chest 11/13 negative for PE, inc ground glass opacities    GI:  - NPO for now +PEG (placed 10/21 by gen surg)  - bowel regimen, last BM 11/14  - CTAP 11/8 negative for infection    - GI ppx: Protonix 40mg daily while on vent     Renal:  - gabriel replaced 11/7 2/2 urinary retention   - FW flushes 250cc q6hrs   - IVL  - hyperK 10/20: lokelma 5mg qd  - hyperPhos: phoslo 667mg TID   - Urinary retenion: Cardura 8 mg   - CKD: trend BUN/Cr  - renal US 10/1: echogenicity c/w chronic med renal dz, repeat 10/8: inc renal echogeicity, c/f medical renal disease w increased hydro     Endo:  - A1c 5.4    Heme:  - DVT ppx: SCDs, SQH 5000u q8h   - LE dopplers negative 11/11    ID:  - febrile, last pancx 11/10  - pan cultured 11/3 (w/out UA) , (+) sputum for stenotrophomas maltophlia, blood cx (+) klebsiella, cefepime 2gq12 (11/6 - 11/12)   - ID following  - empiric tx: s/p zosyn (11/3-11/6) , s/p vanc  (11/3- 11/5)  - S/p Ancef (10/4-10/14) for PNA, and s/p Unasyn (10/18-10/23) +actinobacter baumanii    MISC:  - ophtho consult for keratitis, rec erythromycin ointment to rt eye q4hrs, ofloxacin ointment to rt eye QID, artificial tears to rt eye q2hrs, moisture chamber at bedtime    Dispo: regional, full code, pending placement and emergency medicaid     D/w Dr. D'Amico and Abdirizak

## 2024-11-16 NOTE — PROGRESS NOTE ADULT - SUBJECTIVE AND OBJECTIVE BOX
Neurocritical Care Attending Note    HPI per chart review  -  45y/o M  unknown past medical history (reported stroke and MI by coworkers) presented to [facility] with AMS, Pt was working at [employer] when he was found down by coworkers. EMS called and pt brought to [facility] ED. Intubated, sedated, started on cardene for SBPs in 200s. CT head showed brain stem bleed. Transferred to NSICU for further management.  (30 Sep 2024 12:55)  ICH Score 3    Hospital Course/Interval Events  9/30: transferred from [facility]. A line placed. Versed dc'd. Cy Rader at bedside, states pt has family in [place], cannot confirm medications or PMH other than stroke and MI. 250cc bolus 3% given. LR switched to NS. hydralazine 25q8 started, 3% started, switched propofol to precedex   10/1: stability CTH done. Added labetalol, started TF. Palliative consulted. ethics consulted to determine surrogate. febrile 103, pan cx sent  10/2: BD 2, GEORGE overnight. TF resumed. Desatt'd to 80s, FiO2 inc. to 50. Fentanyl given, ABG, CXR ordered. Maxxed on precedex, started on propofol for DARIEN -4 - -5. Precedex dc'd. Duonebs, mucomyst, hypertonic added. 3% dc'd. Cardene dc'd. Start vanc/CTX. Increased labetalol 200q8. MRSA negative, dc'd vanc. ETT pulled back 2cm x 2, good positioning after confirmatory chest xray. Ethics attempting to establish HCP with family. Na 159, starting FW 250q6 for range 150-155.   10/3: BD3, GEORGE o/n, neuro stable. Na elevating, FW increased to 300q6. Dc'd bowel reg for diarrhea. vEEG started. SQH 5000q8 tonight.   10/4: BD 4, albumn bolus, incr. LR to 80 2/2 incr. in Cr, LR to 100cc/hr for uptrending Cr. Started 7% hypersal for 48hrs and SL atropine for inline/oral thick secretions. Dc'd CTX and started ancef for MSSA in the sputum. Nephrology consulted for CKD, f/u recs. SBP 170s, given hydralazine 10mg IVP.   10/5: BD5, o/n 10mg IVP hydralazine given for SBP 170s and started on hydralazine 25q8 via OGT. 10mg IV push labetalol for SBP > 160s. RT placed for diarrhea.   10/6: BD6, o/n FW increased to 350q4 per nephrology recs. IV tylenol for temp 100.6, SBp 160s presumed uncomfortable.   10/7: BD7, overnight pancultured for temp 101.8F.   10/8: BD8. GEORGE. Cr bumped. decreased LR to 75cc/hr. Adding simethicone ATC. incr hydralazine 50mgTID. Incr labetalol 300mgTID. Na 145, decreased FWF to 250q6. Start precedex. FENa consistent with intrinsic kidney injury. Pend repeat renal US. Retaining up to 1.3L, bladder scans q6, straight cath PRN  10/9: BD 9. GEORGE overnight. Neuro stable. abd xray for distention w non-specific gas pattern, OGT to LIWS for morning. duonebs/mucomyst to q8 for improving secretions. Changed tube feeds to Jevity 1.5 20cc/hr, low rate due to abdominal distention, nepro dense and more difficult to digest. Tolerating CPAP, confirmed by ABG.   10/10: BD 10. GEORGE overnight. Neuro stable. (+) gabriel for urinary retention on bladder scan. inc TF to goal rate of 40cc/hr. family leaning toward pursuing trach/PEG. 1/2 amp for FS 81.   10/11: BD 11. GEORGE overnight. Neuro stable. Trach/PEG consults placed.   10/12: BD 12. GEORGE overnight. Neuro stable. MRI brain complete.   10/13: BD 13. Increase flomax. Hold SQH after PM dose for trach tm. IVL.   10/14: BD 14. GEORGE overnight, remains on AC/VC. Gabriel placed for urinary retention. Dc'd free water.  S/p trach with pulm. NGT placed and CXR confirmed in good position.   10/15: BD 15, GEORGE ovn. resumed feeds. spiked 101, pan cx sent.   10/16: BD 16. GEORGE ovn. Lokelma 5mg for K+ 5.4. Started vanc q 24/zosyn for empiric PNA coverage, IVF to 100/hr. PEG held for fever.   10/17: BD 17,  ordered serum osm and urine osm for am. Started sinemet for neurostimulation. Increased cardura to 0.8. Started FW 100q4, dc'd IVF. MRSA negative, dc'd vanc. NGT replaced d/t coiling.   10/18: BD 18, GEORGE overnight, neuro stable. Amantadine added for neurostim. zosyn changed to unasyn for acinetobacter baumannii, failed TOV and required SC  10/19: BD 19, GEORGE ovn. cardura 2mg added for retention. labetalol decreased 200q8, hydralazine decreased 25q8. Gabriel replaced.   10/20: BD20, GEORGE overnight. NGT dislodged, replaced. PEG tomorrow w/ gen surg, FW increased to 150q4 and labetalol decreased to 100q8, lokelma given for hyperkalemia.   10/21: BD 21. POD0 PEG placement with Gen surg. decr labetolol to 50q8, incr. cardura to 0.4, started lokelma and phoslo, dc gabriel POD0 PEG placement with Gen surg.  10/22: BD 22. Plan to start TF today via PEG. dc labetalol, Following ophtho recs. Increased apnea settings - found to be in cheyne-moe respiration. CPAP 5/5.  10/23: BD 23. hydralazine d/c'd, trach collar trial today. Rectal tube placed at 6am.  10/24: BD24, o/n lokelma held due to diarrhea. Free water 100q6 resumed. dc'd tamsulosin, amantadine. Incr'd cardura to 8mg qhs. Dc'd FW. Switched jevity to nepro. gabriel placed for high urine output. Started SL atropine for oral secretions. Dc'd free water.  10/25: BD25, o/n decreased suctioning requirements to > q4hrs, GEORGE. Cr improving, cont phoslo, lokelma held at this time. Gabriel placed yest, cont. Tolerating trach collar. Given 500cc plasmalyte bolus for ANIKA. Dc'd sinemet.   10/26: BD26, o/n resumed lokelma 5mg daily and resumed 100cc free water q6hrs. Change in neuro status with new right pupillary dilation with anisocoria (right pupil 6mm fixed and left pupil 3mm briskly reactive). Given 23.4% NaCl bullet, taken for emergent CTH showing mostly resolved pontine hemorrhage, continued brainstem hypodensity likely edema d/t hemorrhage, no new hemorrhage or infarct, no herniation, mild increase in size of left lateral ventricle. Vitals remaine stable. Na goal > 140.   10/27: BD27, o/n GEORGE.Neuro stable. Pend stepdown with airway bed.   10/28: BD 28. GEORGE overnight. Neuro stable. Miralax ordered. Gabriel removed, pending TOV.  10/29: BD 29. GEORGE o/n. Given 2L NS over 8 hrs for increased BUN/Cr ratio. Gabriel placed for frequent straight cath.   10/30: BD 30.   10/31: BD 31. GEORGE overnight. Na 149, increased free water to 200q6. 1L NS for uptrending BUN.   11/1: BD 32. GEORGE overnight. Given 1L NS for dehydration. Na 146, increased FW to 250q6.   11/2: BD 33 GEORGE overnight, neuro stable, given 500cc bolus for net negative status and tachycardia   11/3: BD 34, GEORGE overnight, neuro stable. Patient remains tachycardic, EKG showing sinus tachycardia, given additional 500cc NS bolus. Febrile to 101.9F, pan cultured (without UA), CXR WNL, given tylenol.   11/4: BD 35, GEORGE overnight, neuro stable. Given 1L NS for tachycardia. sputum (+) for stenotropohomas maltophilia.   11/5: BD 36 GEORGE overnight, neuro stable. Vancomycin dc'd. Chest PT BID. ID consulted, cont zosyn.  11/6: BD 37. blood cx + klebsiella dc zosyn changed to cefepime, CTAP ordered, rpt blood cx sent.    11/7: BD 38. Pending CT A/P, given 250cc bolus and starting maintenance fluids overnight. Pending CT A/P after bolus   11/8: BD 39. CT CAP negative for infection.   11/9: BD 40. GEORGE overnight.  11/10: BD 41. GEORGE overnight. desat to 85 on trach collar, O2 inc to 10L and 100%, O2 sat inc to 95. pt tachy to 110s, euvolemic. given tylenol. ABG and CXR ordered. spiked fever, pancultured, RVP negative. AM ABG w pO2 79, rpt w pO2 79. pt appears comfortable, satting 94%.   11/11: BD 42. GEORGE overnight. pt became tachy to 130s, desat to 90 on 100% FiO2 and 10L. suctioned, (+) productive cough. temp 101.4, given 1g IV tylenol and 500cc NS bolus for euvolemia. fever and HR downtrending. LE dopplers negative for dvt  11/12: BD 43, GEORGE ovn, fever and HR downtrending, satting 97% 70% FIO2  11/13: BD 44, GEORGE ovn. started standing tylenol x24 hours for tachycardia. desat to 80s, o2 increased. CXR stable, pending CTA PE protocol.   11/14: BD 45, GEORGE overnight, neuro stable. resp therapy dec FiO2 to 70%.   11/15: BD 46, GEORGE overnight, neuro stable.  Rapid called for desaturation 30s, tachycardic 140s. Patient bagged, 100% fio2, heavily suctioned. CXR/POCUS unremarkable. ABG c/w desaturation. WBC 14.71. Afebrile. O2 improved to 90s and patient upgraded to ICU. ABG paO2 30s improved to 89 on vent. IV Tylenol x 1, sputum sent. Start protonix while on vent.   11/16: BD 47. POCUS showed collapsable IVCF, given 1L bolus.       Physical Exam  ICU Vital Signs Last 24 Hrs  T(C): 36.7 (16 Nov 2024 05:07), Max: 38.1 (16 Nov 2024 00:29)  T(F): 98.1 (16 Nov 2024 05:07), Max: 100.6 (16 Nov 2024 00:29)  HR: 90 (16 Nov 2024 05:54) (67 - 124)  BP: 130/97 (16 Nov 2024 05:00) (107/74 - 143/97)  BP(mean): 109 (16 Nov 2024 05:00) (91 - 118)  RR: 16 (16 Nov 2024 05:00) (14 - 18)  SpO2: 100% (16 Nov 2024 05:54) (97% - 100%)    GENERAL: Calm, lying in bed, trach in place  EENT: Anicteric   CARDIOVASCULAR: S1/S2, RRR  PULMONARY: Diminished, copious secretions  ABDOMEN: soft, nontender with normoactive bowel sounds  EXTREMITIES: no edema  SKIN: no rash  Neurological Exam   Mental Status:   CNs:  Motor:  Sensory:      Neurocritical Care Attending Note    HPI per chart review  -  43y/o M  unknown past medical history (reported stroke and MI by coworkers) presented to [facility] with AMS, Pt was working at [employer] when he was found down by coworkers. EMS called and pt brought to [facility] ED. Intubated, sedated, started on cardene for SBPs in 200s. CT head showed brain stem bleed. Transferred to NSICU for further management.  (30 Sep 2024 12:55)  ICH Score 3    Hospital Course/Interval Events  9/30: transferred from [facility]. A line placed. Versed dc'd. Cy Rader at bedside, states pt has family in [place], cannot confirm medications or PMH other than stroke and MI. 250cc bolus 3% given. LR switched to NS. hydralazine 25q8 started, 3% started, switched propofol to precedex   10/1: stability CTH done. Added labetalol, started TF. Palliative consulted. ethics consulted to determine surrogate. febrile 103, pan cx sent  10/2: BD 2, GEORGE overnight. TF resumed. Desatt'd to 80s, FiO2 inc. to 50. Fentanyl given, ABG, CXR ordered. Maxxed on precedex, started on propofol for DARIEN -4 - -5. Precedex dc'd. Duonebs, mucomyst, hypertonic added. 3% dc'd. Cardene dc'd. Start vanc/CTX. Increased labetalol 200q8. MRSA negative, dc'd vanc. ETT pulled back 2cm x 2, good positioning after confirmatory chest xray. Ethics attempting to establish HCP with family. Na 159, starting FW 250q6 for range 150-155.   10/3: BD3, GEORGE o/n, neuro stable. Na elevating, FW increased to 300q6. Dc'd bowel reg for diarrhea. vEEG started. SQH 5000q8 tonight.   10/4: BD 4, albumn bolus, incr. LR to 80 2/2 incr. in Cr, LR to 100cc/hr for uptrending Cr. Started 7% hypersal for 48hrs and SL atropine for inline/oral thick secretions. Dc'd CTX and started ancef for MSSA in the sputum. Nephrology consulted for CKD, f/u recs. SBP 170s, given hydralazine 10mg IVP.   10/5: BD5, o/n 10mg IVP hydralazine given for SBP 170s and started on hydralazine 25q8 via OGT. 10mg IV push labetalol for SBP > 160s. RT placed for diarrhea.   10/6: BD6, o/n FW increased to 350q4 per nephrology recs. IV tylenol for temp 100.6, SBp 160s presumed uncomfortable.   10/7: BD7, overnight pancultured for temp 101.8F.   10/8: BD8. GEORGE. Cr bumped. decreased LR to 75cc/hr. Adding simethicone ATC. incr hydralazine 50mgTID. Incr labetalol 300mgTID. Na 145, decreased FWF to 250q6. Start precedex. FENa consistent with intrinsic kidney injury. Pend repeat renal US. Retaining up to 1.3L, bladder scans q6, straight cath PRN  10/9: BD 9. GEORGE overnight. Neuro stable. abd xray for distention w non-specific gas pattern, OGT to LIWS for morning. duonebs/mucomyst to q8 for improving secretions. Changed tube feeds to Jevity 1.5 20cc/hr, low rate due to abdominal distention, nepro dense and more difficult to digest. Tolerating CPAP, confirmed by ABG.   10/10: BD 10. GEORGE overnight. Neuro stable. (+) gabriel for urinary retention on bladder scan. inc TF to goal rate of 40cc/hr. family leaning toward pursuing trach/PEG. 1/2 amp for FS 81.   10/11: BD 11. GEORGE overnight. Neuro stable. Trach/PEG consults placed.   10/12: BD 12. GEORGE overnight. Neuro stable. MRI brain complete.   10/13: BD 13. Increase flomax. Hold SQH after PM dose for trach tm. IVL.   10/14: BD 14. GEORGE overnight, remains on AC/VC. Gabriel placed for urinary retention. Dc'd free water.  S/p trach with pulm. NGT placed and CXR confirmed in good position.   10/15: BD 15, GEORGE ovn. resumed feeds. spiked 101, pan cx sent.   10/16: BD 16. GEORGE ovn. Lokelma 5mg for K+ 5.4. Started vanc q 24/zosyn for empiric PNA coverage, IVF to 100/hr. PEG held for fever.   10/17: BD 17,  ordered serum osm and urine osm for am. Started sinemet for neurostimulation. Increased cardura to 0.8. Started FW 100q4, dc'd IVF. MRSA negative, dc'd vanc. NGT replaced d/t coiling.   10/18: BD 18, GEORGE overnight, neuro stable. Amantadine added for neurostim. zosyn changed to unasyn for acinetobacter baumannii, failed TOV and required SC  10/19: BD 19, GEORGE ovn. cardura 2mg added for retention. labetalol decreased 200q8, hydralazine decreased 25q8. Gabriel replaced.   10/20: BD20, GEORGE overnight. NGT dislodged, replaced. PEG tomorrow w/ gen surg, FW increased to 150q4 and labetalol decreased to 100q8, lokelma given for hyperkalemia.   10/21: BD 21. POD0 PEG placement with Gen surg. decr labetolol to 50q8, incr. cardura to 0.4, started lokelma and phoslo, dc gabriel POD0 PEG placement with Gen surg.  10/22: BD 22. Plan to start TF today via PEG. dc labetalol, Following ophtho recs. Increased apnea settings - found to be in cheyne-moe respiration. CPAP 5/5.  10/23: BD 23. hydralazine d/c'd, trach collar trial today. Rectal tube placed at 6am.  10/24: BD24, o/n lokelma held due to diarrhea. Free water 100q6 resumed. dc'd tamsulosin, amantadine. Incr'd cardura to 8mg qhs. Dc'd FW. Switched jevity to nepro. gabriel placed for high urine output. Started SL atropine for oral secretions. Dc'd free water.  10/25: BD25, o/n decreased suctioning requirements to > q4hrs, GEORGE. Cr improving, cont phoslo, lokelma held at this time. Gabriel placed yest, cont. Tolerating trach collar. Given 500cc plasmalyte bolus for ANIKA. Dc'd sinemet.   10/26: BD26, o/n resumed lokelma 5mg daily and resumed 100cc free water q6hrs. Change in neuro status with new right pupillary dilation with anisocoria (right pupil 6mm fixed and left pupil 3mm briskly reactive). Given 23.4% NaCl bullet, taken for emergent CTH showing mostly resolved pontine hemorrhage, continued brainstem hypodensity likely edema d/t hemorrhage, no new hemorrhage or infarct, no herniation, mild increase in size of left lateral ventricle. Vitals remaine stable. Na goal > 140.   10/27: BD27, o/n GEROGE.Neuro stable. Pend stepdown with airway bed.   10/28: BD 28. GEORGE overnight. Neuro stable. Miralax ordered. Gabriel removed, pending TOV.  10/29: BD 29. GEORGE o/n. Given 2L NS over 8 hrs for increased BUN/Cr ratio. Gabriel placed for frequent straight cath.   10/30: BD 30.   10/31: BD 31. GEORGE overnight. Na 149, increased free water to 200q6. 1L NS for uptrending BUN.   11/1: BD 32. GEORGE overnight. Given 1L NS for dehydration. Na 146, increased FW to 250q6.   11/2: BD 33 GEORGE overnight, neuro stable, given 500cc bolus for net negative status and tachycardia   11/3: BD 34, GEORGE overnight, neuro stable. Patient remains tachycardic, EKG showing sinus tachycardia, given additional 500cc NS bolus. Febrile to 101.9F, pan cultured (without UA), CXR WNL, given tylenol.   11/4: BD 35, GEORGE overnight, neuro stable. Given 1L NS for tachycardia. sputum (+) for stenotropohomas maltophilia.   11/5: BD 36 GEORGE overnight, neuro stable. Vancomycin dc'd. Chest PT BID. ID consulted, cont zosyn.  11/6: BD 37. blood cx + klebsiella dc zosyn changed to cefepime, CTAP ordered, rpt blood cx sent.    11/7: BD 38. Pending CT A/P, given 250cc bolus and starting maintenance fluids overnight. Pending CT A/P after bolus   11/8: BD 39. CT CAP negative for infection.   11/9: BD 40. GEORGE overnight.  11/10: BD 41. GEORGE overnight. desat to 85 on trach collar, O2 inc to 10L and 100%, O2 sat inc to 95. pt tachy to 110s, euvolemic. given tylenol. ABG and CXR ordered. spiked fever, pancultured, RVP negative. AM ABG w pO2 79, rpt w pO2 79. pt appears comfortable, satting 94%.   11/11: BD 42. GEORGE overnight. pt became tachy to 130s, desat to 90 on 100% FiO2 and 10L. suctioned, (+) productive cough. temp 101.4, given 1g IV tylenol and 500cc NS bolus for euvolemia. fever and HR downtrending. LE dopplers negative for dvt  11/12: BD 43, GEORGE ovn, fever and HR downtrending, satting 97% 70% FIO2  11/13: BD 44, GEORGE ovn. started standing tylenol x24 hours for tachycardia. desat to 80s, o2 increased. CXR stable, pending CTA PE protocol.   11/14: BD 45, GEORGE overnight, neuro stable. resp therapy dec FiO2 to 70%.   11/15: BD 46, GEORGE overnight, neuro stable.  Rapid called for desaturation 30s, tachycardic 140s. Patient bagged, 100% fio2, heavily suctioned. CXR/POCUS unremarkable. ABG c/w desaturation. WBC 14.71. Afebrile. O2 improved to 90s and patient upgraded to ICU. ABG paO2 30s improved to 89 on vent. IV Tylenol x 1, sputum sent. Start protonix while on vent.   11/16: BD 47. POCUS showed collapsable IVCF, given 1L bolus.         MEDICATIONS  (STANDING):  acetylcysteine 10%  Inhalation 4 milliLiter(s) Inhalation every 4 hours  albuterol/ipratropium for Nebulization 3 milliLiter(s) Nebulizer every 4 hours  amLODIPine   Tablet 10 milliGRAM(s) Oral daily  artificial tears (preservative free) Ophthalmic Solution 1 Drop(s) Right EYE every 4 hours  calcium acetate 667 milliGRAM(s) Oral three times a day with meals  chlorhexidine 0.12% Liquid 15 milliLiter(s) Oral Mucosa every 12 hours  doxazosin 8 milliGRAM(s) Oral at bedtime  erythromycin   Ointment 1 Application(s) Right EYE every 6 hours  heparin   Injectable 5000 Unit(s) SubCutaneous every 8 hours  insulin lispro (ADMELOG) corrective regimen sliding scale   SubCutaneous every 6 hours  ofloxacin 0.3% Solution 1 Drop(s) Right EYE every 6 hours  pantoprazole   Suspension 40 milliGRAM(s) Oral daily  petrolatum Ophthalmic Ointment 1 Application(s) Both EYES two times a day  polyethylene glycol 3350 17 Gram(s) Oral two times a day  sodium chloride 0.9%. 1000 milliLiter(s) (75 mL/Hr) IV Continuous <Continuous>  sodium chloride 3%  Inhalation 4 milliLiter(s) Inhalation every 4 hours  sodium zirconium cyclosilicate 5 Gram(s) Oral daily    MEDICATIONS  (PRN):  acetaminophen   Oral Liquid .. 650 milliGRAM(s) Oral every 6 hours PRN Temp greater or equal to 38C (100.4F), Mild Pain (1 - 3)      Physical Exam  ICU Vital Signs Last 24 Hrs  T(C): 36.7 (16 Nov 2024 05:07), Max: 38.1 (16 Nov 2024 00:29)  T(F): 98.1 (16 Nov 2024 05:07), Max: 100.6 (16 Nov 2024 00:29)  HR: 90 (16 Nov 2024 05:54) (67 - 124)  BP: 130/97 (16 Nov 2024 05:00) (107/74 - 143/97)  BP(mean): 109 (16 Nov 2024 05:00) (91 - 118)  RR: 16 (16 Nov 2024 05:00) (14 - 18)  SpO2: 100% (16 Nov 2024 05:54) (97% - 100%)    I&O's Summary    15 Nov 2024 07:01  -  16 Nov 2024 07:00  --------------------------------------------------------  IN: 3080 mL / OUT: 1295 mL / NET: 1785 mL    16 Nov 2024 07:01  -  16 Nov 2024 11:43  --------------------------------------------------------  IN: 300 mL / OUT: 300 mL / NET: 0 mL    GENERAL: Calm, lying in bed, trach in place to vent on PS  EENT: Anicteric   CARDIOVASCULAR: S1/S2, RRR  PULMONARY: Diminished, copious secretions  ABDOMEN: soft, nontender with normoactive bowel sounds  EXTREMITIES: no edema  SKIN: no rash  Neurological Exam   Mental Status: Awake, eyes spontaneously open, tracks and attends briefly, following 1 step command inconsistently (sticking tongue out)  CNs: pupils left >> right reactive to light right sluggish, left facial weakness,   Motor: left upper and lower extremity trace extension to noxious, right upper and lower trace withdrawal to noxious

## 2024-11-16 NOTE — PROGRESS NOTE ADULT - ASSESSMENT
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 Underwent trach and peg as per family wishes and was transferred to the floor where on 11/15 he was noted to desat 2/2 mucus plugging increased secretion/respiratory distress. Was upgraded to the NeuroICU for close respiratory monitoring.       NEURO  Neuroimaging reviewed   most recent CT head 10/25 - IMPRESSION:  1.  Expected evolution of a parenchymal hemorrhage in the nabil and   subacute infarction in the cerebellum.  2.  No interval rebleeding.  3.  Worsened opacification of the right mastoid.    Plan  Neurochecks Q4  VEEG - neg. DCed.  Analgesia prn  Activity: Bedrest    PULM: Acute hypoxemic respiratory failure  RR: 16 (16 Nov 2024 05:00) (14 - 18)  SpO2: 100% (16 Nov 2024 05:54) (97% - 100%)  Chest x-ray 11/15 Tracheostomy. Persistent elevated right hemidiaphragm. Right lower lobe   atelectasis. No pleural effusions.  CT PE study 11/13: No pulmonary embolism through the level of the lobar pulmonary arteries.   Evaluation of more distal segmental and subsegmental pulmonary arteries   is limited by suboptimal contrast bolus timing.  Secretion in trachea and segmental and subsegmental bronchi, most   prominent in right lower lobe with associated groundglass opacity and   small consolidation/atelectasis in keeping with aspiration.  ABG - ( 15 Nov 2024 23:04 )  pH, Arterial: 7.38  pH, Blood: x     /  pCO2: 35    /  pO2: 89    / HCO3: 21    / Base Excess: -3.8  /  SaO2: 98.0      Plan  Aggressive pulm toilet. Duonebs/ mucomyst, 3%.  Vent: CPAP and wean to trach collar  Pulse-oxymetry monitoring       CARDIAC - HTN   ICU Vital Signs Last 24 Hrs  HR: 90 (16 Nov 2024 05:54) (67 - 124)  BP: 130/97 (16 Nov 2024 05:00) (107/74 - 143/97)  BP(mean): 109 (16 Nov 2024 05:00) (91 - 118)  EKG ---> 11/11 - Sinus Tachycardia 122 BPM, P-R Interval 144 ms,  ms  EF 75%  Plan  SBP goal 100-160mm Hg  Continue amlodipine    ENDO - no acute issues   CAPILLARY BLOOD GLUCOSE  POCT Blood Glucose.: 111 mg/dL (16 Nov 2024 05:33)  A1C 5.4,  Blood sugar goals 140-180 mg/dL  ISS    GI: Mild transaminitis  S/P PEG  TPro  6.7  /  Alb  3.0[L]  /  TBili  0.4  /  DBili  x   /  AST  34  /  ALT  63[H]  /  AlkPhos  132[H]  11-16  Plan  DIET: NPO for now  PPI while positive pressure ventilation.  LBM: 11/14    RENAL: Acute urinary retention, ANIKA on CKD  11-16    136  |  108  |  64[H]  ----------------------------<  102[H]  4.8   |  19[L]  |  2.21[H]    Ca    8.7      16 Nov 2024 05:30  Phos  4.4     11-16  Mg     2.4     11-16    Plan  Na goal 135-145  Vuong for urine retention (replaced 11/7) Continue doxazosin  Continue phosphate binder and lokelma. Monitor lytes  Gentle IVF    HEME/ONC:    Hgb 10.3     Plan  VTE prophylaxis: SCDs, SQH    ID: Gram negative bacteremia; Klebsiella  S/P cefepime 11/6- 11/12. ID followed.  Afebrile,  mild  leukocytosis  Cultures:   11/10: Sputum commensal, UA negative.  Blood cultures NGTD  11/3: Stenotrophomas  11/8 CT abd/ pelvis; atelectasis. 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 Underwent trach and peg as per family wishes and was transferred to the floor where on 11/15 he was noted to desat 2/2 mucus plugging increased secretion/respiratory distress. Was upgraded to the NeuroICU for close respiratory monitoring.       NEURO  Neuroimaging reviewed   most recent CT head 10/25 - IMPRESSION: 1.  Expected evolution of a parenchymal hemorrhage in the nabil and subacute infarction in the cerebellum. 2.  No interval rebleeding. 3.  Worsened opacification of the right mastoid.    Plan  Neurochecks Q4  Analgesia prn  Activity: Bedrest    PULM: Acute hypoxemic respiratory failure, s/p tracheostomy    RR: 16 (16 Nov 2024 05:00) (14 - 18)  SpO2: 100% (16 Nov 2024 05:54) (97% - 100%)  Chest x-ray 11/15 Tracheostomy. Persistent elevated right hemidiaphragm. Right lower lobe   atelectasis. No pleural effusions.  CT PE study 11/13: No pulmonary embolism through the level of the lobar pulmonary arteries. Evaluation of more distal segmental and subsegmental pulmonary arteries is limited by suboptimal contrast bolus timing. Secretion in trachea and segmental and subsegmental bronchi, most prominent in right lower lobe with associated groundglass opacity and small consolidation/atelectasis in keeping with aspiration.  ABG - ( 15 Nov 2024 23:04 )  pH, Arterial: 7.38  pH, Blood: x     /  pCO2: 35    /  pO2: 89    / HCO3: 21    / Base Excess: -3.8  /  SaO2: 98.0      Plan  Aggressive pulm toilet. Duonebs/ mucomyst  Vent: CPAP and wean to trach collar  Pulse-oxymetry monitoring   Antibiotics for PNA   Chest x-ray in the morning    CARDIAC - HTN   ICU Vital Signs Last 24 Hrs  HR: 90 (16 Nov 2024 05:54) (67 - 124)  BP: 130/97 (16 Nov 2024 05:00) (107/74 - 143/97)  BP(mean): 109 (16 Nov 2024 05:00) (91 - 118)  EKG ---> 11/11 - Sinus Tachycardia 122 BPM, P-R Interval 144 ms,  ms  EF 75%  Plan  SBP goal 100-160mm Hg  Continue amlodipine    ENDO - no acute issues   CAPILLARY BLOOD GLUCOSE  POCT Blood Glucose.: 111 mg/dL (16 Nov 2024 05:33)  A1C 5.4,  Blood sugar goals 140-180 mg/dL  ISS    GI: Mild transaminitis  S/P PEG  TPro  6.7  /  Alb  3.0[L]  /  TBili  0.4  /  DBili  x   /  AST  34  /  ALT  63[H]  /  AlkPhos  132[H]  11-16  Plan  DIET: restart enteral feeding   PPI while positive pressure ventilation.  LBM: 11/14    RENAL: Acute urinary retention, ANIKA on CKD  11-16    136  |  108  |  64[H]  ----------------------------<  102[H]  4.8   |  19[L]  |  2.21[H]    Ca    8.7      16 Nov 2024 05:30  Phos  4.4     11-16  Mg     2.4     11-16    I&O's Summary    15 Nov 2024 07:01 - 16 Nov 2024 07:00  --------------------------------------------------------  IN: 3080 mL / OUT: 1295 mL / NET: 1785 mL    16 Nov 2024 07:01 - 16 Nov 2024 11:53  --------------------------------------------------------  IN: 300 mL / OUT: 300 mL / NET: 0 mL    Plan  Na goal 135-145  KVO when at goal with enteral feedings  Vuong for urine retention (replaced 11/7) Continue doxazosin  Continue phosphate binder and Lokelma Monitor lytes    HEME/ONC:    Hgb 10.3     Plan  VTE prophylaxis: SCDs, SQH    ID: Gram negative bacteremia; Klebsiella  Cultures:   11/10: Sputum commensal, UA negative.  Blood cultures NGTD  11/3: Stenotrophomas  11/8 CT abd/ pelvis; atelectasis. No infectious process in abd or pelvis  Plan  S/P cefepime 11/6- 11/12. ID followed.  Low grade fever leukocytosis  Will start antibiotics for presumptive PNA pending cultures   Obtain MRSA   Follow-up cultures        CODE STATUS:  [x] Full Code     DISPOSITION:  [x] ICU       Time spent: 60 critical care minutes    Jazlyn Tomlin MD  Neurocritical Care Attending

## 2024-11-16 NOTE — PROGRESS NOTE ADULT - SUBJECTIVE AND OBJECTIVE BOX
[facility] -- ADDITIONAL PROGRESS NOTE    Nighttime rounds were performed       HPI:  Patient is a 43y/o M  unknown past medical history (reported stroke and MI by coworkers) presented to [facility] with AMS, Pt was working at [employer] when he was found down by coworkers. EMS called and pt brought to [facility] ED. Intubated, sedated, started on cardene for SBPs in 200s. CT head showed brain stem bleed. Transferred to NSICU for further management.  (30 Sep 2024 12:55)  ICH Score 3. [facility] due to acute hypoxic respiratory failure.      ICU Vital Signs Last 24 Hrs  T(C): 38 (16 Nov 2024 19:00), Max: 38.1 (16 Nov 2024 00:29)  T(F): 100.4 (16 Nov 2024 19:00), Max: 100.6 (16 Nov 2024 00:29)  HR: 110 (16 Nov 2024 19:00) (84 - 124)  BP: 128/82 (16 Nov 2024 19:00) (112/73 - 138/109)  BP(mean): 99 (16 Nov 2024 19:00) (87 - 118)  RR: 18 (16 Nov 2024 19:00) (12 - 18)  SpO2: 99% (16 Nov 2024 19:00) (97% - 100%)      11-15-24 @ 07:01  -  11-16-24 @ 07:00  --------------------------------------------------------  IN: 3080 mL / OUT: 1295 mL / NET: 1785 mL    11-16-24 @ 07:01  -  11-16-24 @ 19:51  --------------------------------------------------------  IN: 1345 mL / OUT: 930 mL / NET: 415 mL      Mode: CPAP with PS, FiO2: 60, PEEP: 5, PS: 5, MAP: 6.9, PIP: 12      PHYSICAL EXAMINATION  NEURO:  Patient eye opens spontaneously, R 6 sluggish L 4 brisk; FC  tracking with eyes up and down, RUE localizes, RLE wiggles toes, RUE HG 3/5  LUE and LLE 0/5  GENERAL: Calm  EENT: Anicteric   CARDIOVASCULAR: S1/S2, RRR  PULMONARY: Diminished, copious secretions  ABDOMEN: Soft, nontender with normoactive bowel sounds  EXTREMITIES: No edema  SKIN: No rash      MEDICATIONS:   acetaminophen   Oral Liquid .. 650 milliGRAM(s) Oral every 6 hours PRN  acetylcysteine 10%  Inhalation 4 milliLiter(s) Inhalation every 4 hours  albuterol/ipratropium for Nebulization 3 milliLiter(s) Nebulizer every 4 hours  amLODIPine   Tablet 10 milliGRAM(s) Oral daily  artificial tears (preservative free) Ophthalmic Solution 1 Drop(s) Right EYE every 4 hours  calcium acetate 667 milliGRAM(s) Oral three times a day with meals  cefepime   IVPB 2000 milliGRAM(s) IV Intermittent every 12 hours  chlorhexidine 0.12% Liquid 15 milliLiter(s) Oral Mucosa every 12 hours  doxazosin 8 milliGRAM(s) Oral at bedtime  erythromycin   Ointment 1 Application(s) Right EYE every 6 hours  heparin   Injectable 5000 Unit(s) SubCutaneous every 8 hours  insulin lispro (ADMELOG) corrective regimen sliding scale   SubCutaneous every 6 hours  ofloxacin 0.3% Solution 1 Drop(s) Right EYE every 6 hours  pantoprazole   Suspension 40 milliGRAM(s) Oral daily  petrolatum Ophthalmic Ointment 1 Application(s) Both EYES two times a day  polyethylene glycol 3350 17 Gram(s) Oral two times a day  sodium chloride 0.9%. 1000 milliLiter(s) (75 mL/Hr) IV Continuous <Continuous>  sodium zirconium cyclosilicate 5 Gram(s) Oral daily    LABS:                      10.3   11.51 )-----------( 186      ( 16 Nov 2024 05:30 )             32.4     11-16    136  |  108  |  64[H]  ----------------------------<  102[H]  4.8   |  19[L]  |  2.21[H]    Ca    8.7      16 Nov 2024 05:30  Phos  4.4     11-16  Mg     2.4     11-16    TPro  6.7  /  Alb  3.0[L]  /  TBili  0.4  /  DBili  x   /  AST  34  /  ALT  63[H]  /  AlkPhos  132[H]  11-16    LIVER FUNCTIONS - ( 16 Nov 2024 05:30 )  Alb: 3.0 g/dL / Pro: 6.7 g/dL / ALK PHOS: 132 U/L / ALT: 63 U/L / AST: 34 U/L / GGT: x           ABG - ( 15 Nov 2024 23:04 )  pH, Arterial: 7.38  pH, Blood: x     /  pCO2: 35    /  pO2: 89    / HCO3: 21    / Base Excess: -3.8  /  SaO2: 98.0        IMPRESSION:  No pulmonary embolism through the level of the lobar pulmonary arteries.   Evaluation of more distal segmental and subsegmental pulmonary arteries is limited by suboptimal contrast bolus timing.  Secretion in trachea and segmental and subsegmental bronchi, most prominent in right lower lobe with associated groundglass opacity and   small consolidation/atelectasis in keeping with aspiration.

## 2024-11-17 LAB
ANION GAP SERPL CALC-SCNC: 9 MMOL/L — SIGNIFICANT CHANGE UP (ref 5–17)
BUN SERPL-MCNC: 56 MG/DL — HIGH (ref 7–23)
CALCIUM SERPL-MCNC: 8.6 MG/DL — SIGNIFICANT CHANGE UP (ref 8.4–10.5)
CHLORIDE SERPL-SCNC: 116 MMOL/L — HIGH (ref 96–108)
CO2 SERPL-SCNC: 18 MMOL/L — LOW (ref 22–31)
CREAT SERPL-MCNC: 2.13 MG/DL — HIGH (ref 0.5–1.3)
EGFR: 38 ML/MIN/1.73M2 — LOW
EGFR: 38 ML/MIN/1.73M2 — LOW
GLUCOSE SERPL-MCNC: 97 MG/DL — SIGNIFICANT CHANGE UP (ref 70–99)
HCT VFR BLD CALC: 31.6 % — LOW (ref 39–50)
HGB BLD-MCNC: 9.9 G/DL — LOW (ref 13–17)
MAGNESIUM SERPL-MCNC: 2.3 MG/DL — SIGNIFICANT CHANGE UP (ref 1.6–2.6)
MCHC RBC-ENTMCNC: 29.6 PG — SIGNIFICANT CHANGE UP (ref 27–34)
MCHC RBC-ENTMCNC: 31.3 G/DL — LOW (ref 32–36)
MCV RBC AUTO: 94.6 FL — SIGNIFICANT CHANGE UP (ref 80–100)
NRBC # BLD: 0 /100 WBCS — SIGNIFICANT CHANGE UP (ref 0–0)
NRBC BLD-RTO: 0 /100 WBCS — SIGNIFICANT CHANGE UP (ref 0–0)
PHOSPHATE SERPL-MCNC: 4.4 MG/DL — SIGNIFICANT CHANGE UP (ref 2.5–4.5)
PLATELET # BLD AUTO: 178 K/UL — SIGNIFICANT CHANGE UP (ref 150–400)
POTASSIUM SERPL-MCNC: 4.9 MMOL/L — SIGNIFICANT CHANGE UP (ref 3.5–5.3)
POTASSIUM SERPL-SCNC: 4.9 MMOL/L — SIGNIFICANT CHANGE UP (ref 3.5–5.3)
RBC # BLD: 3.34 M/UL — LOW (ref 4.2–5.8)
RBC # FLD: 14.6 % — HIGH (ref 10.3–14.5)
SODIUM SERPL-SCNC: 143 MMOL/L — SIGNIFICANT CHANGE UP (ref 135–145)
WBC # BLD: 8.84 K/UL — SIGNIFICANT CHANGE UP (ref 3.8–10.5)
WBC # FLD AUTO: 8.84 K/UL — SIGNIFICANT CHANGE UP (ref 3.8–10.5)

## 2024-11-17 PROCEDURE — 71045 X-RAY EXAM CHEST 1 VIEW: CPT | Mod: 26

## 2024-11-17 PROCEDURE — 99232 SBSQ HOSP IP/OBS MODERATE 35: CPT

## 2024-11-17 PROCEDURE — 99291 CRITICAL CARE FIRST HOUR: CPT

## 2024-11-17 RX ORDER — ACETAMINOPHEN 500 MG/5ML
650 LIQUID (ML) ORAL EVERY 6 HOURS
Refills: 0 | Status: DISCONTINUED | OUTPATIENT
Start: 2024-11-17 | End: 2024-11-23

## 2024-11-17 RX ORDER — CEFEPIME 2 G/20ML
2000 INJECTION, POWDER, FOR SOLUTION INTRAVENOUS EVERY 12 HOURS
Refills: 0 | Status: DISCONTINUED | OUTPATIENT
Start: 2024-11-18 | End: 2024-11-18

## 2024-11-17 RX ORDER — ALBUMIN (HUMAN) 12.5 G/50ML
250 INJECTION, SOLUTION INTRAVENOUS ONCE
Refills: 0 | Status: COMPLETED | OUTPATIENT
Start: 2024-11-17 | End: 2024-11-17

## 2024-11-17 RX ORDER — METOPROLOL SUCCINATE 50 MG/1
5 TABLET, EXTENDED RELEASE ORAL ONCE
Refills: 0 | Status: COMPLETED | OUTPATIENT
Start: 2024-11-17 | End: 2024-11-17

## 2024-11-17 RX ORDER — FENTANYL CITRATE-0.9 % NACL/PF 100MCG/2ML
25 SYRINGE (ML) INTRAVENOUS ONCE
Refills: 0 | Status: DISCONTINUED | OUTPATIENT
Start: 2024-11-17 | End: 2024-11-17

## 2024-11-17 RX ADMIN — ACETYLCYSTEINE 4 MILLILITER(S): 200 INHALANT RESPIRATORY (INHALATION) at 06:30

## 2024-11-17 RX ADMIN — ACETYLCYSTEINE 4 MILLILITER(S): 200 INHALANT RESPIRATORY (INHALATION) at 09:18

## 2024-11-17 RX ADMIN — Medication 1 APPLICATION(S): at 18:24

## 2024-11-17 RX ADMIN — ACETYLCYSTEINE 4 MILLILITER(S): 200 INHALANT RESPIRATORY (INHALATION) at 17:08

## 2024-11-17 RX ADMIN — AMLODIPINE BESYLATE 10 MILLIGRAM(S): 10 TABLET ORAL at 07:02

## 2024-11-17 RX ADMIN — DOXAZOSIN MESYLATE 8 MILLIGRAM(S): 8 TABLET ORAL at 22:49

## 2024-11-17 RX ADMIN — Medication 1000 MILLILITER(S): at 17:50

## 2024-11-17 RX ADMIN — Medication 667 MILLIGRAM(S): at 10:16

## 2024-11-17 RX ADMIN — CEFEPIME 100 MILLIGRAM(S): 2 INJECTION, POWDER, FOR SOLUTION INTRAVENOUS at 07:02

## 2024-11-17 RX ADMIN — METOPROLOL SUCCINATE 5 MILLIGRAM(S): 50 TABLET, EXTENDED RELEASE ORAL at 19:14

## 2024-11-17 RX ADMIN — HEPARIN SODIUM 5000 UNIT(S): 1000 INJECTION INTRAVENOUS; SUBCUTANEOUS at 14:32

## 2024-11-17 RX ADMIN — SODIUM ZIRCONIUM CYCLOSILICATE 5 GRAM(S): 5 POWDER, FOR SUSPENSION ORAL at 12:07

## 2024-11-17 RX ADMIN — ACETYLCYSTEINE 4 MILLILITER(S): 200 INHALANT RESPIRATORY (INHALATION) at 13:18

## 2024-11-17 RX ADMIN — Medication 1 DROP(S): at 10:16

## 2024-11-17 RX ADMIN — Medication 40 MILLIGRAM(S): at 12:07

## 2024-11-17 RX ADMIN — OFLOXACIN 1 DROP(S): 3 SOLUTION OPHTHALMIC at 12:09

## 2024-11-17 RX ADMIN — OFLOXACIN 1 DROP(S): 3 SOLUTION OPHTHALMIC at 07:03

## 2024-11-17 RX ADMIN — POLYETHYLENE GLYCOL 3350 17 GRAM(S): 17 POWDER, FOR SOLUTION ORAL at 18:12

## 2024-11-17 RX ADMIN — HEPARIN SODIUM 5000 UNIT(S): 1000 INJECTION INTRAVENOUS; SUBCUTANEOUS at 22:49

## 2024-11-17 RX ADMIN — Medication 1 DROP(S): at 07:02

## 2024-11-17 RX ADMIN — IPRATROPIUM BROMIDE AND ALBUTEROL SULFATE 3 MILLILITER(S): .5; 2.5 SOLUTION RESPIRATORY (INHALATION) at 17:08

## 2024-11-17 RX ADMIN — POLYETHYLENE GLYCOL 3350 17 GRAM(S): 17 POWDER, FOR SOLUTION ORAL at 07:01

## 2024-11-17 RX ADMIN — Medication 1 DROP(S): at 18:14

## 2024-11-17 RX ADMIN — Medication 667 MILLIGRAM(S): at 18:12

## 2024-11-17 RX ADMIN — Medication 1 DROP(S): at 14:33

## 2024-11-17 RX ADMIN — ERYTHROMYCIN 1 APPLICATION(S): 5 OINTMENT OPHTHALMIC at 00:17

## 2024-11-17 RX ADMIN — Medication 1 DROP(S): at 22:48

## 2024-11-17 RX ADMIN — IPRATROPIUM BROMIDE AND ALBUTEROL SULFATE 3 MILLILITER(S): .5; 2.5 SOLUTION RESPIRATORY (INHALATION) at 13:18

## 2024-11-17 RX ADMIN — IPRATROPIUM BROMIDE AND ALBUTEROL SULFATE 3 MILLILITER(S): .5; 2.5 SOLUTION RESPIRATORY (INHALATION) at 09:18

## 2024-11-17 RX ADMIN — Medication 15 MILLILITER(S): at 18:12

## 2024-11-17 RX ADMIN — Medication 667 MILLIGRAM(S): at 14:32

## 2024-11-17 RX ADMIN — ERYTHROMYCIN 1 APPLICATION(S): 5 OINTMENT OPHTHALMIC at 07:03

## 2024-11-17 RX ADMIN — Medication 650 MILLIGRAM(S): at 15:26

## 2024-11-17 RX ADMIN — Medication 500 MILLILITER(S): at 19:15

## 2024-11-17 RX ADMIN — OFLOXACIN 1 DROP(S): 3 SOLUTION OPHTHALMIC at 00:18

## 2024-11-17 RX ADMIN — ACETYLCYSTEINE 4 MILLILITER(S): 200 INHALANT RESPIRATORY (INHALATION) at 22:56

## 2024-11-17 RX ADMIN — IPRATROPIUM BROMIDE AND ALBUTEROL SULFATE 3 MILLILITER(S): .5; 2.5 SOLUTION RESPIRATORY (INHALATION) at 02:48

## 2024-11-17 RX ADMIN — ALBUMIN (HUMAN) 125 MILLILITER(S): 12.5 INJECTION, SOLUTION INTRAVENOUS at 19:19

## 2024-11-17 RX ADMIN — ERYTHROMYCIN 1 APPLICATION(S): 5 OINTMENT OPHTHALMIC at 18:13

## 2024-11-17 RX ADMIN — Medication 15 MILLILITER(S): at 07:02

## 2024-11-17 RX ADMIN — Medication 25 MICROGRAM(S): at 19:42

## 2024-11-17 RX ADMIN — Medication 650 MILLIGRAM(S): at 14:49

## 2024-11-17 RX ADMIN — OFLOXACIN 1 DROP(S): 3 SOLUTION OPHTHALMIC at 18:14

## 2024-11-17 RX ADMIN — ACETYLCYSTEINE 4 MILLILITER(S): 200 INHALANT RESPIRATORY (INHALATION) at 02:49

## 2024-11-17 RX ADMIN — Medication 650 MILLIGRAM(S): at 03:00

## 2024-11-17 RX ADMIN — HEPARIN SODIUM 5000 UNIT(S): 1000 INJECTION INTRAVENOUS; SUBCUTANEOUS at 07:02

## 2024-11-17 RX ADMIN — Medication 1000 MILLILITER(S): at 00:59

## 2024-11-17 RX ADMIN — Medication 25 MICROGRAM(S): at 18:09

## 2024-11-17 RX ADMIN — IPRATROPIUM BROMIDE AND ALBUTEROL SULFATE 3 MILLILITER(S): .5; 2.5 SOLUTION RESPIRATORY (INHALATION) at 22:55

## 2024-11-17 RX ADMIN — Medication 650 MILLIGRAM(S): at 01:30

## 2024-11-17 RX ADMIN — Medication 1000 MILLILITER(S): at 18:51

## 2024-11-17 RX ADMIN — Medication 1 APPLICATION(S): at 07:03

## 2024-11-17 RX ADMIN — CEFEPIME 100 MILLIGRAM(S): 2 INJECTION, POWDER, FOR SOLUTION INTRAVENOUS at 18:11

## 2024-11-17 RX ADMIN — IPRATROPIUM BROMIDE AND ALBUTEROL SULFATE 3 MILLILITER(S): .5; 2.5 SOLUTION RESPIRATORY (INHALATION) at 06:30

## 2024-11-17 RX ADMIN — ERYTHROMYCIN 1 APPLICATION(S): 5 OINTMENT OPHTHALMIC at 12:09

## 2024-11-17 NOTE — PROGRESS NOTE ADULT - ASSESSMENT
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 Underwent trach and peg as per family wishes and was transferred to the floor where on 11/15 he was noted to desat 2/2 mucus plugging increased secretion/respiratory distress. Was upgraded to the NeuroICU for close respiratory monitoring.       NEURO  Neuroimaging reviewed   most recent CT head 10/25 - IMPRESSION: 1.  Expected evolution of a parenchymal hemorrhage in the nabil and subacute infarction in the cerebellum. 2.  No interval rebleeding. 3.  Worsened opacification of the right mastoid.    Plan  Neurochecks Q4  Analgesia prn  Activity: Bedrest    PULM: Acute hypoxemic respiratory failure, s/p tracheostomy, increased secretions/atelectasis c/w PNA?  RR: 21 (17 Nov 2024 06:33) (12 - 21)  SpO2: 99% (17 Nov 2024 06:51) (97% - 100%)    Chest x-ray 11/15 Tracheostomy. Persistent elevated right hemidiaphragm. Right lower lobe   atelectasis. No pleural effusions.  CT PE study 11/13: No pulmonary embolism through the level of the lobar pulmonary arteries. Evaluation of more distal segmental and subsegmental pulmonary arteries is limited by suboptimal contrast bolus timing. Secretion in trachea and segmental and subsegmental bronchi, most prominent in right lower lobe with associated groundglass opacity and small consolidation/atelectasis in keeping with aspiration.  ABG - ( 15 Nov 2024 23:04 )  pH, Arterial: 7.38  pH, Blood: x     /  pCO2: 35    /  pO2: 89    / HCO3: 21    / Base Excess: -3.8  /  SaO2: 98.0      Plan  Aggressive pulm toilet/chest PT/mucomyst/duonebs   Vent: CPAP and wean to trach collar  Pulse-oxymetry monitoring   Antibiotics for PNA   Repeat chest x-ray and ABG today      CARDIAC - HTN   Last 24 Hrs  HR: 108 (17 Nov 2024 07:00) (84 - 114)  BP: 138/91 (17 Nov 2024 07:00) (112/73 - 138/91)  BP(mean): 108 (17 Nov 2024 07:00) (87 - 108)  EKG ---> 11/11 - Sinus Tachycardia 122 BPM, P-R Interval 144 ms,  ms  EF 75%  Plan  SBP goal 100-160mm Hg  Continue amlodipine  telemetry monitoring     ENDO - relative hypoglycemia  Blood Glucose last 24 hours   A1C 5.4  Plan  Blood sugar goals 140-180 mg/dL  hypoglycemia protocol     GI: Mild transaminitis, constipation   S/P PEG  TPro  6.7  /  Alb  3.0[L]  /  TBili  0.4  /  DBili  x   /  AST  34  /  ALT  63[H]  /  AlkPhos  132[H]  11-16  Plan  enteral feeding --- > advance to goal  PPI while positive pressure ventilation  Bowel regimen ---> increase  Last Bowel Movement: 11-Nov-2024 (11-11-24 @ 20:40)    RENAL: Acute urinary retention, ANIKA on CKD  11-17    143  |  116[H]  |  56[H]  ----------------------------<  97  4.9   |  18[L]  |  2.13[H]    Ca    8.6      17 Nov 2024 05:30  Phos  4.4     11-17  Mg     2.3     11-17    TPro  6.7  /  Alb  3.0[L]  /  TBili  0.4  /  DBili  x   /  AST  34  /  ALT  63[H]  /  AlkPhos  132[H]  11-16    I&O's Summary    16 Nov 2024 07:01  -  17 Nov 2024 07:00  --------------------------------------------------------  IN: 4070 mL / OUT: 1915 mL / NET: 2155 mL    Plan  Na goal 135-145  KVO when at goal with enteral feedings  Vuong for urine retention (replaced 11/7) Continue doxazosin  Continue phosphate binder and Lokelma Monitor lytes    HEME/ONC:    Hgb 9.9 <-- 10.3   <-- 186   Plan  VTE prophylaxis: SCDs, SQH    ID: Gram negative bacteremia; Klebsiella; fever mild leukocytosis now improving c/w PNA?   T(C): 38 (17 Nov 2024 04:27), Max: 38.2 (17 Nov 2024 00:31)  WBC (11/17) 8.84 <-- 11.71  Cultures:   11/10: Sputum commensal, UA negative.  Blood cultures NGTD  11/3: Stenotrophomas  11/8 CT abd/ pelvis; atelectasis. No infectious process in abd or pelvis  MRSA 11/16 negative  Sputum culture 11/16: gram negative rods (prelim)  Procalcitonin 11/16:  0.35   Plan  Started on broad spectrum antibiotics (Vanc + Cefepime) for presumptive PNA on 11/16 pending cultures   MRSA negative ---> d/c Vancomycin  Continue Cefepime ---> pending cultures for 7-day course   S/P cefepime 11/6- 11/12 for Klebsiella bacteremia  Follow-up cultures      CODE STATUS:  [x] Full Code     DISPOSITION:  [x] ICU       Time spent: 60 critical care minutes    Jazlyn Tomlin MD  Neurocritical Care Attending  Assessment and Plan  43 y/o M with initially admitted on 9/30 with acute large pontine hemorrhage, SAH, brain edema; he has 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 Underwent trach and peg as per family wishes and was stable for transfer to Neurosurgery Stepdown on trach collar. On 11/15 he was noted to desat 2/2 mucus plugging increased secretion/respiratory distress. Was upgraded to the NeuroICU for close respiratory monitoring.       NEURO  Neuroimaging reviewed   most recent CT head 10/25 - IMPRESSION: 1.  Expected evolution of a parenchymal hemorrhage in the nabil and subacute infarction in the cerebellum. 2.  No interval rebleeding. 3.  Worsened opacification of the right mastoid.    Plan  Neurochecks Q4  Analgesia prn  Activity: Bedrest    PULM: Acute hypoxemic respiratory failure, s/p tracheostomy, increased secretions/atelectasis c/w PNA?  RR: 21 (17 Nov 2024 06:33) (12 - 21)  SpO2: 99% (17 Nov 2024 06:51) (97% - 100%)    Chest x-ray 11/15 Tracheostomy. Persistent elevated right hemidiaphragm. Right lower lobe   atelectasis. No pleural effusions.  CT PE study 11/13: No pulmonary embolism through the level of the lobar pulmonary arteries. Evaluation of more distal segmental and subsegmental pulmonary arteries is limited by suboptimal contrast bolus timing. Secretion in trachea and segmental and subsegmental bronchi, most prominent in right lower lobe with associated groundglass opacity and small consolidation/atelectasis in keeping with aspiration.  ABG - ( 15 Nov 2024 23:04 )  pH, Arterial: 7.38  pH, Blood: x     /  pCO2: 35    /  pO2: 89    / HCO3: 21    / Base Excess: -3.8  /  SaO2: 98.0      Plan  Aggressive pulm toilet/chest PT/mucomyst/duonebs   Vent: CPAP and wean to trach collar  Pulse-oxymetry monitoring   Antibiotics for PNA   Repeat chest x-ray and ABG today      CARDIAC - HTN   Last 24 Hrs  HR: 108 (17 Nov 2024 07:00) (84 - 114)  BP: 138/91 (17 Nov 2024 07:00) (112/73 - 138/91)  BP(mean): 108 (17 Nov 2024 07:00) (87 - 108)  EKG ---> 11/11 - Sinus Tachycardia 122 BPM, P-R Interval 144 ms,  ms  EF 75%  Plan  SBP goal 100-160mm Hg  Continue amlodipine  telemetry monitoring     ENDO - relative hypoglycemia  Blood Glucose last 24 hours   A1C 5.4  Plan  Blood sugar goals 140-180 mg/dL  hypoglycemia protocol     GI: Mild transaminitis, constipation   S/P PEG  TPro  6.7  /  Alb  3.0[L]  /  TBili  0.4  /  DBili  x   /  AST  34  /  ALT  63[H]  /  AlkPhos  132[H]  11-16  Plan  enteral feeding --- > advance to goal  PPI while positive pressure ventilation  Bowel regimen ---> increase  Last Bowel Movement: 11-Nov-2024 (11-11-24 @ 20:40)    RENAL: Acute urinary retention, ANIKA on CKD  11-17    143  |  116[H]  |  56[H]  ----------------------------<  97  4.9   |  18[L]  |  2.13[H]    Ca    8.6      17 Nov 2024 05:30  Phos  4.4     11-17  Mg     2.3     11-17    TPro  6.7  /  Alb  3.0[L]  /  TBili  0.4  /  DBili  x   /  AST  34  /  ALT  63[H]  /  AlkPhos  132[H]  11-16    I&O's Summary    16 Nov 2024 07:01  -  17 Nov 2024 07:00  --------------------------------------------------------  IN: 4070 mL / OUT: 1915 mL / NET: 2155 mL    Plan  Na goal 135-145  KVO   q4   Vuong for urine retention (replaced 11/7) Continue doxazosin  Continue phosphate binder and Lokelma Monitor lytes    HEME/ONC:    Hgb 9.9 <-- 10.3   <-- 186   Plan  VTE prophylaxis: SCDs, SQH    ID: Gram negative bacteremia; Klebsiella; fever mild leukocytosis now improving c/w PNA?   T(C): 38 (17 Nov 2024 04:27), Max: 38.2 (17 Nov 2024 00:31)  WBC (11/17) 8.84 <-- 11.71  Cultures:   11/10: Sputum commensal, UA negative.  Blood cultures NGTD  11/3: Stenotrophomas  11/8 CT abd/ pelvis; atelectasis. No infectious process in abd or pelvis  MRSA 11/16 negative  Sputum culture 11/16: gram negative rods (prelim)  Procalcitonin 11/16:  0.35   Plan  Started on broad spectrum antibiotics (Vanc + Cefepime) for presumptive PNA on 11/16 pending cultures   MRSA negative ---> d/c Vancomycin  Continue Cefepime ---> pending cultures for 7-day course   S/P cefepime 11/6- 11/12 for Klebsiella bacteremia  Follow-up cultures    CODE STATUS:  Full Code     DISPOSITION:  NeuroICU       Time spent: 60 critical care minutes    Jazlyn Tomlin MD  Neurocritical Care Attending

## 2024-11-17 NOTE — PROGRESS NOTE ADULT - SUBJECTIVE AND OBJECTIVE BOX
[facility] -- ADDITIONAL PROGRESS NOTE    Nighttime rounds were performed       HPI:  Patient is a 43y/o M  unknown past medical history (reported stroke and MI by coworkers) presented to [facility] with AMS, Pt was working at [employer] when he was found down by coworkers. EMS called and pt brought to [facility] ED. Intubated, sedated, started on cardene for SBPs in 200s. CT head showed brain stem bleed. Transferred to NSICU for further management.  (30 Sep 2024 12:55)  ICH Score 3. Returned to [facility] 11[place]15 due to acute hypoxic respiratory failure.      ICU Vital Signs Last 24 Hrs  T(C): 38.5 (17 Nov 2024 17:00), Max: 38.5 (17 Nov 2024 17:00)  T(F): 101.3 (17 Nov 2024 17:00), Max: 101.3 (17 Nov 2024 17:00)  HR: 101 (17 Nov 2024 17:08) (98 - 114)  BP: 136/92 (17 Nov 2024 17:01) (125/79 - 141/92)  BP(mean): 108 (17 Nov 2024 17:01) (95 - 109)  RR: 17 (17 Nov 2024 17:08) (14 - 24)  SpO2: 99% (17 Nov 2024 17:08) (97% - 100%)      11-16-24 @ 07:01  -  11-17-24 @ 07:00  --------------------------------------------------------  IN: 4070 mL / OUT: 1915 mL / NET: 2155 mL    11-17-24 @ 07:01  -  11-17-24 @ 19:39  --------------------------------------------------------  IN: 640 mL / OUT: 1270 mL / NET: -630 mL      Mode: CPAP with PS, FiO2: 60, PEEP: 5, PS: 5, MAP: 6.1, PIP: 11      PHYSICAL EXAMINATION  NEURO:  Patient eye opens spontaneously, R 6 sluggish L 4 brisk; FC  tracking with eyes up and down, RUE localizes, RLE wiggles toes, RUE HG 3/5  LUE and LLE 0/5  GENERAL: Calm  EENT: Anicteric   CARDIOVASCULAR: S1/S2, RRR  PULMONARY: Diminished, copious secretions  ABDOMEN: Soft, nontender with normoactive bowel sounds  EXTREMITIES: No edema  SKIN: No rash      MEDICATIONS:   acetaminophen   Oral Liquid .. 650 milliGRAM(s) Oral every 6 hours PRN  acetylcysteine 10%  Inhalation 4 milliLiter(s) Inhalation every 4 hours  albuterol/ipratropium for Nebulization 3 milliLiter(s) Nebulizer every 4 hours  amLODIPine   Tablet 10 milliGRAM(s) Oral daily  artificial tears (preservative free) Ophthalmic Solution 1 Drop(s) Right EYE every 4 hours  calcium acetate 667 milliGRAM(s) Oral three times a day with meals  cefepime   IVPB 2000 milliGRAM(s) IV Intermittent every 12 hours  chlorhexidine 0.12% Liquid 15 milliLiter(s) Oral Mucosa every 12 hours  doxazosin 8 milliGRAM(s) Oral at bedtime  erythromycin   Ointment 1 Application(s) Right EYE every 6 hours  heparin   Injectable 5000 Unit(s) SubCutaneous every 8 hours  insulin lispro (ADMELOG) corrective regimen sliding scale   SubCutaneous every 6 hours  ofloxacin 0.3% Solution 1 Drop(s) Right EYE every 6 hours  pantoprazole   Suspension 40 milliGRAM(s) Oral daily  petrolatum Ophthalmic Ointment 1 Application(s) Both EYES two times a day  polyethylene glycol 3350 17 Gram(s) Oral two times a day  sodium zirconium cyclosilicate 5 Gram(s) Oral daily    LABS:                     9.9    8.84  )-----------( 178      ( 17 Nov 2024 05:30 )             31.6     11-17    143  |  116[H]  |  56[H]  ----------------------------<  97  4.9   |  18[L]  |  2.13[H]    Ca    8.6      17 Nov 2024 05:30  Phos  4.4     11-17  Mg     2.3     11-17    TPro  6.7  /  Alb  3.0[L]  /  TBili  0.4  /  DBili  x   /  AST  34  /  ALT  63[H]  /  AlkPhos  132[H]  11-16    LIVER FUNCTIONS - ( 16 Nov 2024 05:30 )  Alb: 3.0 g/dL / Pro: 6.7 g/dL / ALK PHOS: 132 U/L / ALT: 63 U/L / AST: 34 U/L / GGT: x           ABG - ( 15 Nov 2024 23:04 )  pH, Arterial: 7.38  pH, Blood: x     /  pCO2: 35    /  pO2: 89    / HCO3: 21    / Base Excess: -3.8  /  SaO2: 98.0          IMPRESSION:  No pulmonary embolism through the level of the lobar pulmonary arteries.   Evaluation of more distal segmental and subsegmental pulmonary arteries is limited by suboptimal contrast bolus timing.  Secretion in trachea and segmental and subsegmental bronchi, most prominent in right lower lobe with associated groundglass opacity and   small consolidation/atelectasis in keeping with aspiration.

## 2024-11-17 NOTE — PROGRESS NOTE ADULT - SUBJECTIVE AND OBJECTIVE BOX
S/Overnight events:  BD 48. GEORGE overnight.     Hospital Course:   9/30: transferred from [facility]. A line placed. Versed dc'd. Cy Rader at bedside, states pt has family in [place], cannot confirm medications or PMH other than stroke and MI. 250cc bolus 3% given. LR switched to NS. hydralazine 25q8 started, 3% started, switched propofol to precedex   10/1: stability CTH done. Added labetalol, started TF. Palliative consulted. ethics consulted to determine surrogate. febrile 103, pan cx sent  10/2: BD 2, GEORGE overnight. TF resumed. Desatt'd to 80s, FiO2 inc. to 50. Fentanyl given, ABG, CXR ordered. Maxxed on precedex, started on propofol for DARIEN -4 - -5. Precedex dc'd. Duonebs, mucomyst, hypertonic added. 3% dc'd. Cardene dc'd. Start vanc/CTX. Increased labetalol 200q8. MRSA negative, dc'd vanc. ETT pulled back 2cm x 2, good positioning after confirmatory chest xray. Ethics attempting to establish HCP with family. Na 159, starting FW 250q6 for range 150-155.   10/3: BD3, GEORGE o/n, neuro stable. Na elevating, FW increased to 300q6. Dc'd bowel reg for diarrhea. vEEG started. SQH 5000q8 tonight.   10/4: BD 4, albumn bolus, incr. LR to 80 2/2 incr. in Cr, LR to 100cc/hr for uptrending Cr. Started 7% hypersal for 48hrs and SL atropine for inline/oral thick secretions. Dc'd CTX and started ancef for MSSA in the sputum. Nephrology consulted for CKD, f/u recs. SBP 170s, given hydralazine 10mg IVP.   10/5: BD5, o/n 10mg IVP hydralazine given for SBP 170s and started on hydralazine 25q8 via OGT. 10mg IV push labetalol for SBP > 160s. RT placed for diarrhea.   10/6: BD6, o/n FW increased to 350q4 per nephrology recs. IV tylenol for temp 100.6, SBp 160s presumed uncomfortable.   10/7: BD7, overnight pancultured for temp 101.8F.   10/8: BD8. GEORGE. Cr bumped. decreased LR to 75cc/hr. Adding simethicone ATC. incr hydralazine 50mgTID. Incr labetalol 300mgTID. Na 145, decreased FWF to 250q6. Start precedex. FENa consistent with intrinsic kidney injury. Pend repeat renal US. Retaining up to 1.3L, bladder scans q6, straight cath PRN  10/9: BD 9. GEORGE overnight. Neuro stable. abd xray for distention w non-specific gas pattern, OGT to LIWS for morning. duonebs/mucomyst to q8 for improving secretions. Changed tube feeds to Jevity 1.5 20cc/hr, low rate due to abdominal distention, nepro dense and more difficult to digest. Tolerating CPAP, confirmed by ABG.   10/10: BD 10. GEORGE overnight. Neuro stable. (+) gabriel for urinary retention on bladder scan. inc TF to goal rate of 40cc/hr. family leaning toward pursuing trach/PEG. 1/2 amp for FS 81.   10/11: BD 11. GEORGE overnight. Neuro stable. Trach/PEG consults placed.   10/12: BD 12. GEORGE overnight. Neuro stable. MRI brain complete.   10/13: BD 13. Increase flomax. Hold SQH after PM dose for trach tm. IVL.   10/14: BD 14. GEORGE overnight, remains on AC/VC. Gabriel placed for urinary retention. Dc'd free water.  S/p trach with pulm. NGT placed and CXR confirmed in good position.   10/15: BD 15, GEORGE ovn. resumed feeds. spiked 101, pan cx sent.   10/16: BD 16. GEORGE ovn. Lokelma 5mg for K+ 5.4. Started vanc q 24/zosyn for empiric PNA coverage, IVF to 100/hr. PEG held for fever.   10/17: BD 17,  ordered serum osm and urine osm for am. Started sinemet for neurostimulation. Increased cardura to 0.8. Started FW 100q4, dc'd IVF. MRSA negative, dc'd vanc. NGT replaced d/t coiling.   10/18: BD 18, GEORGE overnight, neuro stable. Amantadine added for neurostim. zosyn changed to unasyn for acinetobacter baumannii, failed TOV and required SC  10/19: BD 19, GEORGE ovn. cardura 2mg added for retention. labetalol decreased 200q8, hydralazine decreased 25q8. Gabriel replaced.   10/20: BD20, GEORGE overnight. NGT dislodged, replaced. PEG tomorrow w/ gen surg, FW increased to 150q4 and labetalol decreased to 100q8, lokelma given for hyperkalemia.   10/21: BD 21. POD0 PEG placement with Gen surg. decr labetolol to 50q8, incr. cardura to 0.4, started lokelma and phoslo, dc gabriel POD0 PEG placement with Gen surg.  10/22: BD 22. Plan to start TF today via PEG. dc labetalol, Following ophtho recs. Increased apnea settings - found to be in cheyne-moe respiration. CPAP 5/5.  10/23: BD 23. hydralazine d/c'd, trach collar trial today. Rectal tube placed at 6am.  10/24: BD24, o/n lokelma held due to diarrhea. Free water 100q6 resumed. dc'd tamsulosin, amantadine. Incr'd cardura to 8mg qhs. Dc'd FW. Switched jevity to nepro. gabriel placed for high urine output. Started SL atropine for oral secretions. Dc'd free water.  10/25: BD25, o/n decreased suctioning requirements to > q4hrs, GEORGE. Cr improving, cont phoslo, lokelma held at this time. Gabriel placed yest, cont. Tolerating trach collar. Given 500cc plasmalyte bolus for ANIKA. Dc'd sinemet.   10/26: BD26, o/n resumed lokelma 5mg daily and resumed 100cc free water q6hrs. Change in neuro status with new right pupillary dilation with anisocoria (right pupil 6mm fixed and left pupil 3mm briskly reactive). Given 23.4% NaCl bullet, taken for emergent CTH showing mostly resolved pontine hemorrhage, continued brainstem hypodensity likely edema d/t hemorrhage, no new hemorrhage or infarct, no herniation, mild increase in size of left lateral ventricle. Vitals remaine stable. Na goal > 140.   10/27: BD27, o/n GEORGE.Neuro stable. Pend stepdown with airway bed.   10/28: BD 28. GEORGE overnight. Neuro stable. Miralax ordered. Gabriel removed, pending TOV.  10/29: BD 29. GEORGE o/n. Given 2L NS over 8 hrs for increased BUN/Cr ratio. Gabriel placed for frequent straight cath.   10/30: BD 30.   10/31: BD 31. GEORGE overnight. Na 149, increased free water to 200q6. 1L NS for uptrending BUN.   11/1: BD 32. GEORGE overnight. Given 1L NS for dehydration. Na 146, increased FW to 250q6.   11/2: BD 33 GEORGE overnight, neuro stable, given 500cc bolus for net negative status and tachycardia   11/3: BD 34, GEORGE overnight, neuro stable. Patient remains tachycardic, EKG showing sinus tachycardia, given additional 500cc NS bolus. Febrile to 101.9F, pan cultured (without UA), CXR WNL, given tylenol.   11/4: BD 35, GEORGE overnight, neuro stable. Given 1L NS for tachycardia. sputum (+) for stenotropohomas maltophilia.   11/5: BD 36 GEORGE overnight, neuro stable. Vancomycin dc'd. Chest PT BID. ID consulted, cont zosyn.  11/6: BD 37. blood cx + klebsiella dc zosyn changed to cefepime, CTAP ordered, rpt blood cx sent.    11/7: BD 38. Pending CT A/P, given 250cc bolus and starting maintenance fluids overnight. Pending CT A/P after bolus   11/8: BD 39. CT CAP negative for infection.   11/9: BD 40. GEORGE overnight.  11/10: BD 41. GEORGE overnight. desat to 85 on trach collar, O2 inc to 10L and 100%, O2 sat inc to 95. pt tachy to 110s, euvolemic. given tylenol. ABG and CXR ordered. spiked fever, pancultured, RVP negative. AM ABG w pO2 79, rpt w pO2 79. pt appears comfortable, satting 94%.   11/11: BD 42. GEORGE overnight. pt became tachy to 130s, desat to 90 on 100% FiO2 and 10L. suctioned, (+) productive cough. temp 101.4, given 1g IV tylenol and 500cc NS bolus for euvolemia. fever and HR downtrending. LE dopplers negative for dvt  11/12: BD 43, GEORGE ovn, fever and HR downtrending, satting 97% 70% FIO2  11/13: BD 44, GEORGE ovn. started standing tylenol x24 hours for tachycardia. desat to 80s, o2 increased. CXR stable, pending CTA PE protocol.   11/14: BD 45, GEORGE overnight, neuro stable. resp therapy dec FiO2 to 70%.   11/15: BD 46, GEORGE overnight, neuro stable.  Rapid called for desaturation 30s, tachycardic 140s. Patient bagged, 100% fio2, heavily suctioned. CXR/POCUS unremarkable. ABG c/w desaturation. WBC 14.71. Afebrile. O2 improved to 90s and patient upgraded to ICU. ABG paO2 30s improved to 89 on vent. IV Tylenol x 1, sputum sent. Start protonix while on vent.   11/16: BD 47. POCUS showed collapsable IVCF, given 1L bolus. Vanco/Cefpime added empriic for PNA, NGT feeds restarted. MRSA swab neg, Vanc DC'd.     Vital Signs Last 24 Hrs  T(C): 38 (16 Nov 2024 22:31), Max: 38 (16 Nov 2024 19:00)  T(F): 100.4 (16 Nov 2024 22:31), Max: 100.4 (16 Nov 2024 19:00)  HR: 104 (16 Nov 2024 23:00) (84 - 124)  BP: 127/84 (16 Nov 2024 23:00) (112/73 - 136/93)  BP(mean): 100 (16 Nov 2024 23:00) (87 - 111)  RR: 16 (16 Nov 2024 23:00) (12 - 21)  SpO2: 99% (16 Nov 2024 23:00) (97% - 100%)    Parameters below as of 16 Nov 2024 23:00  Patient On (Oxygen Delivery Method): ventilator    O2 Concentration (%): 60    I&O's Detail    15 Nov 2024 07:01  -  16 Nov 2024 07:00  --------------------------------------------------------  IN:    Enteral Tube Flush: 180 mL    Free Water: 500 mL    Nepro with Carb Steady: 100 mL    sodium chloride 0.9%: 400 mL    sodium chloride 0.9%: 675 mL    Sodium Chloride 0.9% Bolus: 1225 mL  Total IN: 3080 mL    OUT:    Indwelling Catheter - Urethral (mL): 495 mL    Oral Fluid: 0 mL    Voided (mL): 800 mL  Total OUT: 1295 mL    Total NET: 1785 mL      16 Nov 2024 07:01  -  17 Nov 2024 00:29  --------------------------------------------------------  IN:    Free Water: 500 mL    IV PiggyBack: 50 mL    Nepro with Carb Steady: 135 mL    sodium chloride 0.9%: 750 mL  Total IN: 1435 mL    OUT:    Indwelling Catheter - Urethral (mL): 1155 mL  Total OUT: 1155 mL    Total NET: 280 mL    I&O's Summary    15 Nov 2024 07:01  -  16 Nov 2024 07:00  --------------------------------------------------------  IN: 3080 mL / OUT: 1295 mL / NET: 1785 mL    16 Nov 2024 07:01  -  17 Nov 2024 00:29  --------------------------------------------------------  IN: 1435 mL / OUT: 1155 mL / NET: 280 mL    PHYSICAL EXAM:  Constitutional: Pt found in bed in NAD   ENT: PERRL, vertical EOMi, tracks vertically, R 3mm brisk, L 3mm brisk   Respiratory: +trach to vent, breathing non-labored, symmetrical chest wall movement  Cardiovascuar: RRR, no murmurs  Gastrointestinal: +PEG, abdomen soft, non tender  Neurological:  AAOX0. Intermittently follows commands   Motor: RUE bicep 0/5, tricep 4/5, HG 3/5, RLE wiggles toes to command, LUE extends to noxious, LLE withdraws from noxious  Sensation: unable to assess   Pronator Drift: unable to assess    DIET:  [] NPO  [] Mechanical  [X] Tube feeds    LABS:  PT/INR - ( 15 Nov 2024 20:36 )   PT: 12.3 sec;   INR: 1.07          PTT - ( 15 Nov 2024 20:36 )  PTT:31.2 sec  Urinalysis Basic - ( 16 Nov 2024 05:30 )    Color: x / Appearance: x / SG: x / pH: x  Gluc: 102 mg/dL / Ketone: x  / Bili: x / Urobili: x   Blood: x / Protein: x / Nitrite: x   Leuk Esterase: x / RBC: x / WBC x   Sq Epi: x / Non Sq Epi: x / Bacteria: x      CAPILLARY BLOOD GLUCOSE    POCT Blood Glucose.: 89 mg/dL (16 Nov 2024 16:49)  POCT Blood Glucose.: 104 mg/dL (16 Nov 2024 11:31)  POCT Blood Glucose.: 111 mg/dL (16 Nov 2024 05:33)      Allergies    Allergy Status Unknown    Intolerances      MEDICATIONS:  Antibiotics:  cefepime   IVPB 2000 milliGRAM(s) IV Intermittent every 12 hours  cefepime   IVPB 2000 milliGRAM(s) IV Intermittent every 12 hours    Neuro:  acetaminophen   Oral Liquid .. 650 milliGRAM(s) Oral every 6 hours PRN    Anticoagulation:  heparin   Injectable 5000 Unit(s) SubCutaneous every 8 hours    OTHER:  acetylcysteine 10%  Inhalation 4 milliLiter(s) Inhalation every 4 hours  albuterol/ipratropium for Nebulization 3 milliLiter(s) Nebulizer every 4 hours  amLODIPine   Tablet 10 milliGRAM(s) Oral daily  artificial tears (preservative free) Ophthalmic Solution 1 Drop(s) Right EYE every 4 hours  chlorhexidine 0.12% Liquid 15 milliLiter(s) Oral Mucosa every 12 hours  doxazosin 8 milliGRAM(s) Oral at bedtime  erythromycin   Ointment 1 Application(s) Right EYE every 6 hours  insulin lispro (ADMELOG) corrective regimen sliding scale   SubCutaneous every 6 hours  ofloxacin 0.3% Solution 1 Drop(s) Right EYE every 6 hours  pantoprazole   Suspension 40 milliGRAM(s) Oral daily  petrolatum Ophthalmic Ointment 1 Application(s) Both EYES two times a day  polyethylene glycol 3350 17 Gram(s) Oral two times a day  sodium zirconium cyclosilicate 5 Gram(s) Oral daily    IVF:  calcium acetate 667 milliGRAM(s) Oral three times a day with meals  sodium chloride 0.9%. 1000 milliLiter(s) IV Continuous <Continuous>    CULTURES:  Culture Results:   Commensal iram consistent with body site (11-11 @ 05:06)  Culture Results:   No growth (11-10 @ 17:21)    ASSESSMENT:  45 y/o PMH ?stroke/MI present to [facility] after collapsing at work. Decorticate posturing, vomiting, intubated for airway protection. 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 Transferred to [facility] for further management. s/p trach 10/14. s/p peg 10/21. Re-upgrade to ICU 2/2 desaturation event and suctioning requirements 11/15.     AMS    Handoff    MEWS Score    Acute myocardial infarction    CVA (cerebral vascular accident)    Intracerebral hemorrhage of brain stem    Brainstem stroke    Brain stem stroke syndrome    Brain stem hemorrhage    Brain stem stroke syndrome    Hemorrhagic stroke    Brainstem stroke    Encephalopathy acute    Functional quadriplegia    Advanced care planning/counseling discussion    Encounter for palliative care    Pontine hemorrhage    Neurogenic dysphagia    Chronic respiratory failure    Acute kidney injury superimposed on CKD    Acute urinary retention    Hypertensive emergency    Sepsis, unspecified organism    Sepsis    Gram-negative bacteremia    Percutaneous tracheal puncture    Altered mental status examination    EGD, with PEG    AMS    [email or web address]      PLAN:  Neuro:  - neuro q4/vitals q1  - pain control: tylenol prn  - CTH 9/30: enlarged pontine hemorrhage, CTH 10/3: read stable, CTH 10/25 anisocoric pupils (R sluggish 6mm compared to left 3mm briskly reactive); showing mostly resolved pontine hemorrhage, continued brainstem hypodensity likely edema d/t hemorrhage, no new hemorrhage or infarct, no herniation, mild increase in size of left lateral ventricle  - vEEG (10/3-4)- negative, (10/17-10/19) - negative  - stroke core measures, stroke neuro signed off  - MRI brain w/ w/o contrast 10/12: parenchymal hemorrhage, acute/subacute R cerebellar stroke      CV:  - -160  - HTN: amlodipine 10 mg qd  - echo (9/30) EF 75%    Resp:  - Trach to vent CPAP 5/5  - Secretions: duonebs/mucomyst, 3% inhaltation q4h stopped  - chest PT q4  - CTA chest 11/13 negative for PE, inc ground glass opacities    GI:  - TF via PEG (placed 10/21 by gen surg)  - bowel regimen, last BM 11/14  - CTAP 11/8 negative for infection    - GI ppx: Protonix 40mg daily while on vent     Renal:  - +gabriel replaced 11/7 2/2 urinary retention   - FW flushes 250cc q6hrs   - IVL  - hyperK 10/20: lokelma 5mg qd  - hyperPhos: phoslo 667mg TID   - Urinary retenion: Cardura 8 mg   - CKD: trend BUN/Cr  - renal US 10/1: echogenicity c/w chronic med renal dz, repeat 10/8: inc renal echogeicity, c/f medical renal disease w increased hydro     Endo:  - A1c 5.4    Heme:  - DVT ppx: SCDs, SQH 5000u q8h   - LE dopplers negative 11/11    ID:  - Empiric PNA coverage: Cefepime (11/16- )  - f/u Sputum 11/16  - MRSA swab neg 11/16   - febrile, last pancx 11/10, sputum 11/15  - pan cultured 11/3 (w/out UA) , (+) sputum for stenotrophomas maltophlia, blood cx (+) klebsiella, cefepime 2gq12 (11/6 - 11/12)   - empiric tx: s/p zosyn (11/3-11/6) , s/p vanc  (11/3- 11/5)  - S/p Ancef (10/4-10/14) for PNA, and s/p Unasyn (10/18-10/23) +actinobacter baumanii    MISC:  - ophtho consult for keratitis, rec erythromycin ointment to rt eye q4hrs, ofloxacin ointment to rt eye QID, artificial tears to rt eye q2hrs, moisture chamber at bedtime    Dispo: regional, full code, pending placement and emergency medicaid     D/w Dr. D'Amico and Abdirizak

## 2024-11-17 NOTE — CHART NOTE - NSCHARTNOTEFT_GEN_A_CORE
Infectious Diseases Anti-infective Approval Note    Medication:  Cefepime   Dose:  2 grams   Route:  IV  Frequency:  q12  Duration**:  3 days   Purpose:  (check one)       Empiric: pending cultures  X       Empiric:  no culture data       Final duration:      Dose may be adjusted as needed for alterations in renal function.    *THIS IS NOT AN INFECTIOUS DISEASES CONSULTATION*    **Indicates duration of approval, not necessarily duration of treatment.

## 2024-11-17 NOTE — PROGRESS NOTE ADULT - ASSESSMENT
45 y/o M with:  Large pontine hemorrhage, SAH, brain edema; ?locked-in  CVA  Acute hypoxemic respiratory failure  History MI  Acute on chronic CKD, hydronephrosis      PLAN:   NEURO:   Neurochecks Q4  VEEG - neg.   Analgesia prn  Activity: PT/OT as tolerated    PULM: Acute hypoxemic respiratory failure ? mucous plugging  Aggressive pulm toilet. Duonebs/ mucomyst, 3%.  Vent: CPAP and wean to trach collar  ABG, CXR reviewed.   CTPE protocol 11/13. No PE or significant consolidation. Atelectasis.    CARDIAC: Sinus tachycardia (febrile, o/w hemodynamically stable)  POCUS;  SBP goal 100-160mm Hg  Continue amlodipine  EF 75%    ENDO:   Blood sugar goals 140-180 mg/dL  A1C 5.4, ISS    GI: Mild transaminitis  S/P PEG  DIET: TFs  Continue PPI.  LBM: 11/14**    RENAL: Acute urinary retention, ANIKA on CKD  Stable creatinine  Na goal 135-145  Vuong for urine retention (replaced 11/7) Continue doxazosin  Continue phosphate binder and lokelma. Monitor lytes  Gentle IVF. IVL once at goal    HEME/ONC:    Monitor H/H  VTE prophylaxis: SCDs, SQH    ID: Gram negative bacteremia; Klebsiella; resolved.  S/P cefepime 11/6- 11/12. ID followed.  Afebrile,  mild  leukocytosis  Cultures:  11/15:  Sputum- GNR. Follow speciation  11/10: Sputum commensal, UA negative.. 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 No infectious process in abd or pelvis  Abx: Cefepime resumed on 11/16. S/P vancomycin. MRSA negative    MISC:    SOCIAL/FAMILY:  [x] awaiting [] updated at bedside [] family meeting    CODE STATUS:  [x] Full Code [] DNR [] DNI [] Palliative/Comfort Care    DISPOSITION:  [x] ICU [] Stroke Unit [] Floor [] EMU [] RCU [] PCU    Time spent: 45 critical care minutes     45 y/o M with:  Large pontine hemorrhage, SAH, brain edema; ?locked-in  CVA  Acute hypoxemic respiratory failure  History MI  Acute on chronic CKD, hydronephrosis      PLAN:   NEURO:   Neurochecks Q4  VEEG - neg.   Analgesia prn  Activity: PT/OT as tolerated    PULM: Acute hypoxemic respiratory failure ? mucous plugging  Aggressive pulm toilet. Duonebs/ mucomyst, 3%.  Vent: CPAP and wean to trach collar  ABG, CXR reviewed.   CTPE protocol 11/13. No PE or significant consolidation. Atelectasis.    CARDIAC: Sinus tachycardia (febrile, o/w hemodynamically stable)  POCUS 11/16 collapsed IVC with variability. Given IV fluid hydration  SBP goal 100-160mm Hg  Continue amlodipine  EF 75%    ENDO:   Blood sugar goals 140-180 mg/dL  A1C 5.4, ISS    GI: Mild transaminitis  S/P PEG. Diet: TFs at goal  Continue PPI  LBM: 11/17    RENAL: Acute urinary retention, ANIKA on CKD  Stable creatinine  Na goal 135-145. Continue free water  Vuong for urine retention (replaced 11/7) Continue doxazosin  Continue phosphate binder and lokelma. Monitor lytes  IVF boluses for euvolemia    HEME/ONC:    Monitor H/H  VTE prophylaxis: SCDs, SQH      ID: Gram negative bacteremia; Klebsiella; resolved.  S/P cefepime 11/6- 11/12. ID followed.  Afebrile,  mild  leukocytosis  Cultures:  11/15:  Sputum- Stenotrophomomas (?colonization). Blood cultures 11/16 pending result  11/10: Sputum commensal, UA negative.. 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 No infectious process in abd or pelvis  Abx: Cefepime resumed on 11/16 x 5 day course. S/P vancomycin (Dced). MRSA negative    MISC:    SOCIAL/FAMILY:  [x] awaiting [] updated at bedside [] family meeting    CODE STATUS:  [x] Full Code [] DNR [] DNI [] Palliative/Comfort Care    DISPOSITION:  [x] ICU [] Stroke Unit [] Floor [] EMU [] RCU [] PCU    Time spent: 45 critical care minutes

## 2024-11-17 NOTE — PROGRESS NOTE ADULT - SUBJECTIVE AND OBJECTIVE BOX
Neurocritical Care Attending Note    HPI per chart review  -  45y/o M  unknown past medical history (reported stroke and MI by coworkers) presented to [facility] with AMS, Pt was working at [employer] when he was found down by coworkers. EMS called and pt brought to [facility] ED. Intubated, sedated, started on cardene for SBPs in 200s. CT head showed brain stem bleed. Transferred to NSICU for further management.  (30 Sep 2024 12:55)  ICH Score 3    Hospital Course/Interval Events  9/30: transferred from [facility]. A line placed. Versed dc'd. Cy Rader at bedside, states pt has family in [place], cannot confirm medications or PMH other than stroke and MI. 250cc bolus 3% given. LR switched to NS. hydralazine 25q8 started, 3% started, switched propofol to precedex   10/1: stability CTH done. Added labetalol, started TF. Palliative consulted. ethics consulted to determine surrogate. febrile 103, pan cx sent  10/2: BD 2, GEORGE overnight. TF resumed. Desatt'd to 80s, FiO2 inc. to 50. Fentanyl given, ABG, CXR ordered. Maxxed on precedex, started on propofol for DARIEN -4 - -5. Precedex dc'd. Duonebs, mucomyst, hypertonic added. 3% dc'd. Cardene dc'd. Start vanc/CTX. Increased labetalol 200q8. MRSA negative, dc'd vanc. ETT pulled back 2cm x 2, good positioning after confirmatory chest xray. Ethics attempting to establish HCP with family. Na 159, starting FW 250q6 for range 150-155.   10/3: BD3, GEORGE o/n, neuro stable. Na elevating, FW increased to 300q6. Dc'd bowel reg for diarrhea. vEEG started. SQH 5000q8 tonight.   10/4: BD 4, albumn bolus, incr. LR to 80 2/2 incr. in Cr, LR to 100cc/hr for uptrending Cr. Started 7% hypersal for 48hrs and SL atropine for inline/oral thick secretions. Dc'd CTX and started ancef for MSSA in the sputum. Nephrology consulted for CKD, f/u recs. SBP 170s, given hydralazine 10mg IVP.   10/5: BD5, o/n 10mg IVP hydralazine given for SBP 170s and started on hydralazine 25q8 via OGT. 10mg IV push labetalol for SBP > 160s. RT placed for diarrhea.   10/6: BD6, o/n FW increased to 350q4 per nephrology recs. IV tylenol for temp 100.6, SBp 160s presumed uncomfortable.   10/7: BD7, overnight pancultured for temp 101.8F.   10/8: BD8. GEORGE. Cr bumped. decreased LR to 75cc/hr. Adding simethicone ATC. incr hydralazine 50mgTID. Incr labetalol 300mgTID. Na 145, decreased FWF to 250q6. Start precedex. FENa consistent with intrinsic kidney injury. Pend repeat renal US. Retaining up to 1.3L, bladder scans q6, straight cath PRN  10/9: BD 9. GEORGE overnight. Neuro stable. abd xray for distention w non-specific gas pattern, OGT to LIWS for morning. duonebs/mucomyst to q8 for improving secretions. Changed tube feeds to Jevity 1.5 20cc/hr, low rate due to abdominal distention, nepro dense and more difficult to digest. Tolerating CPAP, confirmed by ABG.   10/10: BD 10. GEORGE overnight. Neuro stable. (+) gabriel for urinary retention on bladder scan. inc TF to goal rate of 40cc/hr. family leaning toward pursuing trach/PEG. 1/2 amp for FS 81.   10/11: BD 11. GEORGE overnight. Neuro stable. Trach/PEG consults placed.   10/12: BD 12. GEORGE overnight. Neuro stable. MRI brain complete.   10/13: BD 13. Increase flomax. Hold SQH after PM dose for trach tm. IVL.   10/14: BD 14. GEORGE overnight, remains on AC/VC. Gabriel placed for urinary retention. Dc'd free water.  S/p trach with pulm. NGT placed and CXR confirmed in good position.   10/15: BD 15, GEORGE ovn. resumed feeds. spiked 101, pan cx sent.   10/16: BD 16. GEORGE ovn. Lokelma 5mg for K+ 5.4. Started vanc q 24/zosyn for empiric PNA coverage, IVF to 100/hr. PEG held for fever.   10/17: BD 17,  ordered serum osm and urine osm for am. Started sinemet for neurostimulation. Increased cardura to 0.8. Started FW 100q4, dc'd IVF. MRSA negative, dc'd vanc. NGT replaced d/t coiling.   10/18: BD 18, GEORGE overnight, neuro stable. Amantadine added for neurostim. zosyn changed to unasyn for acinetobacter baumannii, failed TOV and required SC  10/19: BD 19, GEORGE ovn. cardura 2mg added for retention. labetalol decreased 200q8, hydralazine decreased 25q8. Gabriel replaced.   10/20: BD20, GEORGE overnight. NGT dislodged, replaced. PEG tomorrow w/ gen surg, FW increased to 150q4 and labetalol decreased to 100q8, lokelma given for hyperkalemia.   10/21: BD 21. POD0 PEG placement with Gen surg. decr labetolol to 50q8, incr. cardura to 0.4, started lokelma and phoslo, dc gabriel POD0 PEG placement with Gen surg.  10/22: BD 22. Plan to start TF today via PEG. dc labetalol, Following ophtho recs. Increased apnea settings - found to be in cheyne-moe respiration. CPAP 5/5.  10/23: BD 23. hydralazine d/c'd, trach collar trial today. Rectal tube placed at 6am.  10/24: BD24, o/n lokelma held due to diarrhea. Free water 100q6 resumed. dc'd tamsulosin, amantadine. Incr'd cardura to 8mg qhs. Dc'd FW. Switched jevity to nepro. gabriel placed for high urine output. Started SL atropine for oral secretions. Dc'd free water.  10/25: BD25, o/n decreased suctioning requirements to > q4hrs, GEORGE. Cr improving, cont phoslo, lokelma held at this time. Gabriel placed yest, cont. Tolerating trach collar. Given 500cc plasmalyte bolus for ANIKA. Dc'd sinemet.   10/26: BD26, o/n resumed lokelma 5mg daily and resumed 100cc free water q6hrs. Change in neuro status with new right pupillary dilation with anisocoria (right pupil 6mm fixed and left pupil 3mm briskly reactive). Given 23.4% NaCl bullet, taken for emergent CTH showing mostly resolved pontine hemorrhage, continued brainstem hypodensity likely edema d/t hemorrhage, no new hemorrhage or infarct, no herniation, mild increase in size of left lateral ventricle. Vitals remaine stable. Na goal > 140.   10/27: BD27, o/n GEORGE.Neuro stable. Pend stepdown with airway bed.   10/28: BD 28. GEORGE overnight. Neuro stable. Miralax ordered. Gabriel removed, pending TOV.  10/29: BD 29. GEORGE o/n. Given 2L NS over 8 hrs for increased BUN/Cr ratio. Gabriel placed for frequent straight cath.   10/30: BD 30.   10/31: BD 31. GEORGE overnight. Na 149, increased free water to 200q6. 1L NS for uptrending BUN.   11/1: BD 32. GEORGE overnight. Given 1L NS for dehydration. Na 146, increased FW to 250q6.   11/2: BD 33 GEORGE overnight, neuro stable, given 500cc bolus for net negative status and tachycardia   11/3: BD 34, GEORGE overnight, neuro stable. Patient remains tachycardic, EKG showing sinus tachycardia, given additional 500cc NS bolus. Febrile to 101.9F, pan cultured (without UA), CXR WNL, given tylenol.   11/4: BD 35, GEORGE overnight, neuro stable. Given 1L NS for tachycardia. sputum (+) for stenotropohomas maltophilia.   11/5: BD 36 GEORGE overnight, neuro stable. Vancomycin dc'd. Chest PT BID. ID consulted, cont zosyn.  11/6: BD 37. blood cx + klebsiella dc zosyn changed to cefepime, CTAP ordered, rpt blood cx sent.    11/7: BD 38. Pending CT A/P, given 250cc bolus and starting maintenance fluids overnight. Pending CT A/P after bolus   11/8: BD 39. CT CAP negative for infection.   11/9: BD 40. GEORGE overnight.  11/10: BD 41. GEORGE overnight. desat to 85 on trach collar, O2 inc to 10L and 100%, O2 sat inc to 95. pt tachy to 110s, euvolemic. given tylenol. ABG and CXR ordered. spiked fever, pancultured, RVP negative. AM ABG w pO2 79, rpt w pO2 79. pt appears comfortable, satting 94%.   11/11: BD 42. GEORGE overnight. pt became tachy to 130s, desat to 90 on 100% FiO2 and 10L. suctioned, (+) productive cough. temp 101.4, given 1g IV tylenol and 500cc NS bolus for euvolemia. fever and HR downtrending. LE dopplers negative for dvt  11/12: BD 43, GEORGE ovn, fever and HR downtrending, satting 97% 70% FIO2  11/13: BD 44, GEORGE ovn. started standing tylenol x24 hours for tachycardia. desat to 80s, o2 increased. CXR stable, pending CTA PE protocol.   11/14: BD 45, GEORGE overnight, neuro stable. resp therapy dec FiO2 to 70%.   11/15: BD 46, GEORGE overnight, neuro stable.  Rapid called for desaturation 30s, tachycardic 140s. Patient bagged, 100% fio2, heavily suctioned. CXR/POCUS unremarkable. ABG c/w desaturation. WBC 14.71. Afebrile. O2 improved to 90s and patient upgraded to ICU. ABG paO2 30s improved to 89 on vent. IV Tylenol x 1, sputum sent. Start protonix while on vent.   11/16: BD 47. POCUS showed collapsable IVCF, given 1L bolus. Vanco/Cefpime added empriic for PNA, NGT feeds restarted. MRSA swab neg, Vanc DC'd.   11/17: BD 48. GEORGE overnight. 1l bolus for tachycardia. Spiked to 101, cultured.     MEDICATIONS  (STANDING):  acetylcysteine 10%  Inhalation 4 milliLiter(s) Inhalation every 4 hours  albuterol/ipratropium for Nebulization 3 milliLiter(s) Nebulizer every 4 hours  amLODIPine   Tablet 10 milliGRAM(s) Oral daily  artificial tears (preservative free) Ophthalmic Solution 1 Drop(s) Right EYE every 4 hours  calcium acetate 667 milliGRAM(s) Oral three times a day with meals  cefepime   IVPB 2000 milliGRAM(s) IV Intermittent every 12 hours  chlorhexidine 0.12% Liquid 15 milliLiter(s) Oral Mucosa every 12 hours  doxazosin 8 milliGRAM(s) Oral at bedtime  erythromycin   Ointment 1 Application(s) Right EYE every 6 hours  heparin   Injectable 5000 Unit(s) SubCutaneous every 8 hours  insulin lispro (ADMELOG) corrective regimen sliding scale   SubCutaneous every 6 hours  ofloxacin 0.3% Solution 1 Drop(s) Right EYE every 6 hours  pantoprazole   Suspension 40 milliGRAM(s) Oral daily  petrolatum Ophthalmic Ointment 1 Application(s) Both EYES two times a day  polyethylene glycol 3350 17 Gram(s) Oral two times a day  sodium zirconium cyclosilicate 5 Gram(s) Oral daily    MEDICATIONS  (PRN):  acetaminophen   Oral Liquid .. 650 milliGRAM(s) Oral every 6 hours PRN Temp greater or equal to 38C (100.4F), Mild Pain (1 - 3)        Physical Exam  ICU Vital Signs Last 24 Hrs  T(C): 38 (17 Nov 2024 04:27), Max: 38.2 (17 Nov 2024 00:31)  T(F): 100.4 (17 Nov 2024 04:27), Max: 100.8 (17 Nov 2024 00:31)  HR: 110 (17 Nov 2024 06:51) (84 - 113)  BP: 134/88 (17 Nov 2024 05:00) (112/73 - 137/88)  BP(mean): 106 (17 Nov 2024 05:00) (87 - 106)  RR: 21 (17 Nov 2024 06:33) (12 - 21)  SpO2: 99% (17 Nov 2024 06:51) (97% - 100%)    GENERAL: Calm, lying in bed, trach in place to vent on PS  EENT: Anicteric   CARDIOVASCULAR: S1/S2, RRR  PULMONARY: Diminished, copious secretions  ABDOMEN: soft, nontender with normoactive bowel sounds  EXTREMITIES: no edema  SKIN: no rash  Neurological Exam   Mental Status: Awake, eyes spontaneously open, tracks and attends briefly, following 1 step command inconsistently (sticking tongue out)  CNs: pupils left >> right reactive to light right sluggish, left facial weakness,   Motor: left upper and lower extremity trace extension to noxious, right upper and lower trace withdrawal to noxious    Neurocritical Care Attending Note    HPI per chart review  -  45y/o M  unknown past medical history (reported stroke and MI by coworkers) presented to [facility] with AMS, Pt was working at [employer] when he was found down by coworkers. EMS called and pt brought to [facility] ED. Intubated, sedated, started on cardene for SBPs in 200s. CT head showed brain stem bleed. Transferred to NSICU for further management.  (30 Sep 2024 12:55)  ICH Score 3    Hospital Course/Interval Events  9/30: transferred from [facility]. A line placed. Versed dc'd. Cy Rader at bedside, states pt has family in [place], cannot confirm medications or PMH other than stroke and MI. 250cc bolus 3% given. LR switched to NS. hydralazine 25q8 started, 3% started, switched propofol to precedex   10/1: stability CTH done. Added labetalol, started TF. Palliative consulted. ethics consulted to determine surrogate. febrile 103, pan cx sent  10/2: BD 2, GEORGE overnight. TF resumed. Desatt'd to 80s, FiO2 inc. to 50. Fentanyl given, ABG, CXR ordered. Maxxed on precedex, started on propofol for DARIEN -4 - -5. Precedex dc'd. Duonebs, mucomyst, hypertonic added. 3% dc'd. Cardene dc'd. Start vanc/CTX. Increased labetalol 200q8. MRSA negative, dc'd vanc. ETT pulled back 2cm x 2, good positioning after confirmatory chest xray. Ethics attempting to establish HCP with family. Na 159, starting FW 250q6 for range 150-155.   10/3: BD3, GEORGE o/n, neuro stable. Na elevating, FW increased to 300q6. Dc'd bowel reg for diarrhea. vEEG started. SQH 5000q8 tonight.   10/4: BD 4, albumn bolus, incr. LR to 80 2/2 incr. in Cr, LR to 100cc/hr for uptrending Cr. Started 7% hypersal for 48hrs and SL atropine for inline/oral thick secretions. Dc'd CTX and started ancef for MSSA in the sputum. Nephrology consulted for CKD, f/u recs. SBP 170s, given hydralazine 10mg IVP.   10/5: BD5, o/n 10mg IVP hydralazine given for SBP 170s and started on hydralazine 25q8 via OGT. 10mg IV push labetalol for SBP > 160s. RT placed for diarrhea.   10/6: BD6, o/n FW increased to 350q4 per nephrology recs. IV tylenol for temp 100.6, SBp 160s presumed uncomfortable.   10/7: BD7, overnight pancultured for temp 101.8F.   10/8: BD8. GEORGE. Cr bumped. decreased LR to 75cc/hr. Adding simethicone ATC. incr hydralazine 50mgTID. Incr labetalol 300mgTID. Na 145, decreased FWF to 250q6. Start precedex. FENa consistent with intrinsic kidney injury. Pend repeat renal US. Retaining up to 1.3L, bladder scans q6, straight cath PRN  10/9: BD 9. GEORGE overnight. Neuro stable. abd xray for distention w non-specific gas pattern, OGT to LIWS for morning. duonebs/mucomyst to q8 for improving secretions. Changed tube feeds to Jevity 1.5 20cc/hr, low rate due to abdominal distention, nepro dense and more difficult to digest. Tolerating CPAP, confirmed by ABG.   10/10: BD 10. GEORGE overnight. Neuro stable. (+) gabriel for urinary retention on bladder scan. inc TF to goal rate of 40cc/hr. family leaning toward pursuing trach/PEG. 1/2 amp for FS 81.   10/11: BD 11. GEORGE overnight. Neuro stable. Trach/PEG consults placed.   10/12: BD 12. GEORGE overnight. Neuro stable. MRI brain complete.   10/13: BD 13. Increase flomax. Hold SQH after PM dose for trach tm. IVL.   10/14: BD 14. GEORGE overnight, remains on AC/VC. Gabriel placed for urinary retention. Dc'd free water.  S/p trach with pulm. NGT placed and CXR confirmed in good position.   10/15: BD 15, GEORGE ovn. resumed feeds. spiked 101, pan cx sent.   10/16: BD 16. GEORGE ovn. Lokelma 5mg for K+ 5.4. Started vanc q 24/zosyn for empiric PNA coverage, IVF to 100/hr. PEG held for fever.   10/17: BD 17,  ordered serum osm and urine osm for am. Started sinemet for neurostimulation. Increased cardura to 0.8. Started FW 100q4, dc'd IVF. MRSA negative, dc'd vanc. NGT replaced d/t coiling.   10/18: BD 18, GEORGE overnight, neuro stable. Amantadine added for neurostim. zosyn changed to unasyn for acinetobacter baumannii, failed TOV and required SC  10/19: BD 19, GEORGE ovn. cardura 2mg added for retention. labetalol decreased 200q8, hydralazine decreased 25q8. Gabriel replaced.   10/20: BD20, GEORGE overnight. NGT dislodged, replaced. PEG tomorrow w/ gen surg, FW increased to 150q4 and labetalol decreased to 100q8, lokelma given for hyperkalemia.   10/21: BD 21. POD0 PEG placement with Gen surg. decr labetolol to 50q8, incr. cardura to 0.4, started lokelma and phoslo, dc gabriel POD0 PEG placement with Gen surg.  10/22: BD 22. Plan to start TF today via PEG. dc labetalol, Following ophtho recs. Increased apnea settings - found to be in cheyne-moe respiration. CPAP 5/5.  10/23: BD 23. hydralazine d/c'd, trach collar trial today. Rectal tube placed at 6am.  10/24: BD24, o/n lokelma held due to diarrhea. Free water 100q6 resumed. dc'd tamsulosin, amantadine. Incr'd cardura to 8mg qhs. Dc'd FW. Switched jevity to nepro. gabriel placed for high urine output. Started SL atropine for oral secretions. Dc'd free water.  10/25: BD25, o/n decreased suctioning requirements to > q4hrs, GEORGE. Cr improving, cont phoslo, lokelma held at this time. Gabriel placed yest, cont. Tolerating trach collar. Given 500cc plasmalyte bolus for ANIKA. Dc'd sinemet.   10/26: BD26, o/n resumed lokelma 5mg daily and resumed 100cc free water q6hrs. Change in neuro status with new right pupillary dilation with anisocoria (right pupil 6mm fixed and left pupil 3mm briskly reactive). Given 23.4% NaCl bullet, taken for emergent CTH showing mostly resolved pontine hemorrhage, continued brainstem hypodensity likely edema d/t hemorrhage, no new hemorrhage or infarct, no herniation, mild increase in size of left lateral ventricle. Vitals remaine stable. Na goal > 140.   10/27: BD27, o/n GEORGE.Neuro stable. Pend stepdown with airway bed.   10/28: BD 28. GEORGE overnight. Neuro stable. Miralax ordered. Gabriel removed, pending TOV.  10/29: BD 29. GEORGE o/n. Given 2L NS over 8 hrs for increased BUN/Cr ratio. Gabriel placed for frequent straight cath.   10/30: BD 30.   10/31: BD 31. GEORGE overnight. Na 149, increased free water to 200q6. 1L NS for uptrending BUN.   11/1: BD 32. GEORGE overnight. Given 1L NS for dehydration. Na 146, increased FW to 250q6.   11/2: BD 33 GEORGE overnight, neuro stable, given 500cc bolus for net negative status and tachycardia   11/3: BD 34, GEORGE overnight, neuro stable. Patient remains tachycardic, EKG showing sinus tachycardia, given additional 500cc NS bolus. Febrile to 101.9F, pan cultured (without UA), CXR WNL, given tylenol.   11/4: BD 35, GEORGE overnight, neuro stable. Given 1L NS for tachycardia. sputum (+) for stenotropohomas maltophilia.   11/5: BD 36 GEORGE overnight, neuro stable. Vancomycin dc'd. Chest PT BID. ID consulted, cont zosyn.  11/6: BD 37. blood cx + klebsiella dc zosyn changed to cefepime, CTAP ordered, rpt blood cx sent.    11/7: BD 38. Pending CT A/P, given 250cc bolus and starting maintenance fluids overnight. Pending CT A/P after bolus   11/8: BD 39. CT CAP negative for infection.   11/9: BD 40. GEORGE overnight.  11/10: BD 41. GEORGE overnight. desat to 85 on trach collar, O2 inc to 10L and 100%, O2 sat inc to 95. pt tachy to 110s, euvolemic. given tylenol. ABG and CXR ordered. spiked fever, pancultured, RVP negative. AM ABG w pO2 79, rpt w pO2 79. pt appears comfortable, satting 94%.   11/11: BD 42. GEORGE overnight. pt became tachy to 130s, desat to 90 on 100% FiO2 and 10L. suctioned, (+) productive cough. temp 101.4, given 1g IV tylenol and 500cc NS bolus for euvolemia. fever and HR downtrending. LE dopplers negative for dvt  11/12: BD 43, GEORGE ovn, fever and HR downtrending, satting 97% 70% FIO2  11/13: BD 44, GEORGE ovn. started standing tylenol x24 hours for tachycardia. desat to 80s, o2 increased. CXR stable, pending CTA PE protocol.   11/14: BD 45, GEORGE overnight, neuro stable. resp therapy dec FiO2 to 70%.   11/15: BD 46, GEORGE overnight, neuro stable.  Rapid called for desaturation 30s, tachycardic 140s. Patient bagged, 100% fio2, heavily suctioned. CXR/POCUS unremarkable. ABG c/w desaturation. WBC 14.71. Afebrile. O2 improved to 90s and patient upgraded to ICU. ABG paO2 30s improved to 89 on vent. IV Tylenol x 1, sputum sent. Start protonix while on vent.   11/16: BD 47. POCUS showed collapsable IVCF, given 1L bolus. Vanco/Cefpime added empriic for PNA, NGT feeds restarted. MRSA swab neg, Vanc DC'd.   11/17: BD 48. GEORGE overnight. 1l bolus for tachycardia. Spiked to 101, cultured.     MEDICATIONS  (STANDING):  acetylcysteine 10%  Inhalation 4 milliLiter(s) Inhalation every 4 hours  albuterol/ipratropium for Nebulization 3 milliLiter(s) Nebulizer every 4 hours  amLODIPine   Tablet 10 milliGRAM(s) Oral daily  artificial tears (preservative free) Ophthalmic Solution 1 Drop(s) Right EYE every 4 hours  calcium acetate 667 milliGRAM(s) Oral three times a day with meals  cefepime   IVPB 2000 milliGRAM(s) IV Intermittent every 12 hours  chlorhexidine 0.12% Liquid 15 milliLiter(s) Oral Mucosa every 12 hours  doxazosin 8 milliGRAM(s) Oral at bedtime  erythromycin   Ointment 1 Application(s) Right EYE every 6 hours  heparin   Injectable 5000 Unit(s) SubCutaneous every 8 hours  insulin lispro (ADMELOG) corrective regimen sliding scale   SubCutaneous every 6 hours  ofloxacin 0.3% Solution 1 Drop(s) Right EYE every 6 hours  pantoprazole   Suspension 40 milliGRAM(s) Oral daily  petrolatum Ophthalmic Ointment 1 Application(s) Both EYES two times a day  polyethylene glycol 3350 17 Gram(s) Oral two times a day  sodium zirconium cyclosilicate 5 Gram(s) Oral daily    MEDICATIONS  (PRN):  acetaminophen   Oral Liquid .. 650 milliGRAM(s) Oral every 6 hours PRN Temp greater or equal to 38C (100.4F), Mild Pain (1 - 3)    Physical Exam  ICU Vital Signs Last 24 Hrs  T(C): 38 (17 Nov 2024 04:27), Max: 38.2 (17 Nov 2024 00:31)  T(F): 100.4 (17 Nov 2024 04:27), Max: 100.8 (17 Nov 2024 00:31)  HR: 110 (17 Nov 2024 06:51) (84 - 113)  BP: 134/88 (17 Nov 2024 05:00) (112/73 - 137/88)  BP(mean): 106 (17 Nov 2024 05:00) (87 - 106)  RR: 21 (17 Nov 2024 06:33) (12 - 21)  SpO2: 99% (17 Nov 2024 06:51) (97% - 100%)    GENERAL: Calm, lying in bed, trach in place to vent on PS  EENT: Anicteric   CARDIOVASCULAR: S1/S2, RRR  PULMONARY: Diminished, copious secretions  ABDOMEN: soft, nontender with normoactive bowel sounds  EXTREMITIES: no edema  SKIN: no rash  Neurological Exam   Mental Status: Awake, eyes spontaneously open, tracks and attends briefly, following 1 step command inconsistently (sticking tongue out)  CNs: pupils left >> right reactive to light right sluggish, left facial weakness,   Motor: left upper and lower extremity trace extension to noxious, right upper and lower trace withdrawal to noxious

## 2024-11-18 LAB
-  LEVOFLOXACIN: SIGNIFICANT CHANGE UP
-  MINOCYCLINE: SIGNIFICANT CHANGE UP
-  TRIMETHOPRIM/SULFAMETHOXAZOLE: SIGNIFICANT CHANGE UP
ANION GAP SERPL CALC-SCNC: 9 MMOL/L — SIGNIFICANT CHANGE UP (ref 5–17)
BUN SERPL-MCNC: 50 MG/DL — HIGH (ref 7–23)
CALCIUM SERPL-MCNC: 9 MG/DL — SIGNIFICANT CHANGE UP (ref 8.4–10.5)
CHLORIDE SERPL-SCNC: 115 MMOL/L — HIGH (ref 96–108)
CO2 SERPL-SCNC: 20 MMOL/L — LOW (ref 22–31)
CREAT SERPL-MCNC: 1.8 MG/DL — HIGH (ref 0.5–1.3)
CULTURE RESULTS: ABNORMAL
EGFR: 46 ML/MIN/1.73M2 — LOW
EGFR: 46 ML/MIN/1.73M2 — LOW
GAS PNL BLDA: SIGNIFICANT CHANGE UP
GLUCOSE SERPL-MCNC: 109 MG/DL — HIGH (ref 70–99)
HCT VFR BLD CALC: 31.8 % — LOW (ref 39–50)
HGB BLD-MCNC: 9.9 G/DL — LOW (ref 13–17)
MAGNESIUM SERPL-MCNC: 2.3 MG/DL — SIGNIFICANT CHANGE UP (ref 1.6–2.6)
MCHC RBC-ENTMCNC: 29.7 PG — SIGNIFICANT CHANGE UP (ref 27–34)
MCHC RBC-ENTMCNC: 31.1 G/DL — LOW (ref 32–36)
MCV RBC AUTO: 95.5 FL — SIGNIFICANT CHANGE UP (ref 80–100)
METHOD TYPE: SIGNIFICANT CHANGE UP
METHOD TYPE: SIGNIFICANT CHANGE UP
NRBC # BLD: 0 /100 WBCS — SIGNIFICANT CHANGE UP (ref 0–0)
NRBC BLD-RTO: 0 /100 WBCS — SIGNIFICANT CHANGE UP (ref 0–0)
ORGANISM # SPEC MICROSCOPIC CNT: ABNORMAL
ORGANISM # SPEC MICROSCOPIC CNT: ABNORMAL
ORGANISM # SPEC MICROSCOPIC CNT: SIGNIFICANT CHANGE UP
PHOSPHATE SERPL-MCNC: 3.7 MG/DL — SIGNIFICANT CHANGE UP (ref 2.5–4.5)
PLATELET # BLD AUTO: 179 K/UL — SIGNIFICANT CHANGE UP (ref 150–400)
POTASSIUM SERPL-MCNC: 4.5 MMOL/L — SIGNIFICANT CHANGE UP (ref 3.5–5.3)
POTASSIUM SERPL-SCNC: 4.5 MMOL/L — SIGNIFICANT CHANGE UP (ref 3.5–5.3)
RBC # BLD: 3.33 M/UL — LOW (ref 4.2–5.8)
RBC # FLD: 14.8 % — HIGH (ref 10.3–14.5)
SODIUM SERPL-SCNC: 144 MMOL/L — SIGNIFICANT CHANGE UP (ref 135–145)
SPECIMEN SOURCE: SIGNIFICANT CHANGE UP
WBC # BLD: 9.07 K/UL — SIGNIFICANT CHANGE UP (ref 3.8–10.5)
WBC # FLD AUTO: 9.07 K/UL — SIGNIFICANT CHANGE UP (ref 3.8–10.5)

## 2024-11-18 PROCEDURE — 99232 SBSQ HOSP IP/OBS MODERATE 35: CPT

## 2024-11-18 PROCEDURE — 99291 CRITICAL CARE FIRST HOUR: CPT

## 2024-11-18 RX ORDER — SULFAMETHOXAZOLE/TRIMETHOPRIM 800-160 MG
320 TABLET ORAL EVERY 8 HOURS
Refills: 0 | Status: DISCONTINUED | OUTPATIENT
Start: 2024-11-18 | End: 2024-11-19

## 2024-11-18 RX ORDER — IPRATROPIUM BROMIDE AND ALBUTEROL SULFATE .5; 2.5 MG/3ML; MG/3ML
3 SOLUTION RESPIRATORY (INHALATION) EVERY 6 HOURS
Refills: 0 | Status: DISCONTINUED | OUTPATIENT
Start: 2024-11-18 | End: 2024-11-19

## 2024-11-18 RX ORDER — ACETYLCYSTEINE 200 MG/ML
4 INHALANT RESPIRATORY (INHALATION) EVERY 6 HOURS
Refills: 0 | Status: DISCONTINUED | OUTPATIENT
Start: 2024-11-18 | End: 2024-11-19

## 2024-11-18 RX ORDER — SULFAMETHOXAZOLE/TRIMETHOPRIM 800-160 MG
320 TABLET ORAL EVERY 8 HOURS
Refills: 0 | Status: DISCONTINUED | OUTPATIENT
Start: 2024-11-18 | End: 2024-11-18

## 2024-11-18 RX ADMIN — Medication 1 APPLICATION(S): at 17:36

## 2024-11-18 RX ADMIN — ERYTHROMYCIN 1 APPLICATION(S): 5 OINTMENT OPHTHALMIC at 07:01

## 2024-11-18 RX ADMIN — Medication 1 DROP(S): at 09:24

## 2024-11-18 RX ADMIN — Medication 40 MILLIGRAM(S): at 11:45

## 2024-11-18 RX ADMIN — Medication 320 MILLIGRAM(S): at 23:54

## 2024-11-18 RX ADMIN — Medication 650 MILLIGRAM(S): at 14:33

## 2024-11-18 RX ADMIN — IPRATROPIUM BROMIDE AND ALBUTEROL SULFATE 3 MILLILITER(S): .5; 2.5 SOLUTION RESPIRATORY (INHALATION) at 03:06

## 2024-11-18 RX ADMIN — AMLODIPINE BESYLATE 10 MILLIGRAM(S): 10 TABLET ORAL at 07:01

## 2024-11-18 RX ADMIN — ACETYLCYSTEINE 4 MILLILITER(S): 200 INHALANT RESPIRATORY (INHALATION) at 03:07

## 2024-11-18 RX ADMIN — Medication 1 DROP(S): at 13:42

## 2024-11-18 RX ADMIN — DOXAZOSIN MESYLATE 8 MILLIGRAM(S): 8 TABLET ORAL at 21:32

## 2024-11-18 RX ADMIN — Medication 1 DROP(S): at 07:02

## 2024-11-18 RX ADMIN — Medication 650 MILLIGRAM(S): at 21:33

## 2024-11-18 RX ADMIN — HEPARIN SODIUM 5000 UNIT(S): 1000 INJECTION INTRAVENOUS; SUBCUTANEOUS at 13:36

## 2024-11-18 RX ADMIN — IPRATROPIUM BROMIDE AND ALBUTEROL SULFATE 3 MILLILITER(S): .5; 2.5 SOLUTION RESPIRATORY (INHALATION) at 09:08

## 2024-11-18 RX ADMIN — ACETYLCYSTEINE 4 MILLILITER(S): 200 INHALANT RESPIRATORY (INHALATION) at 21:26

## 2024-11-18 RX ADMIN — ACETYLCYSTEINE 4 MILLILITER(S): 200 INHALANT RESPIRATORY (INHALATION) at 09:08

## 2024-11-18 RX ADMIN — OFLOXACIN 1 DROP(S): 3 SOLUTION OPHTHALMIC at 07:02

## 2024-11-18 RX ADMIN — IPRATROPIUM BROMIDE AND ALBUTEROL SULFATE 3 MILLILITER(S): .5; 2.5 SOLUTION RESPIRATORY (INHALATION) at 21:26

## 2024-11-18 RX ADMIN — Medication 1 DROP(S): at 02:06

## 2024-11-18 RX ADMIN — Medication 15 MILLILITER(S): at 17:36

## 2024-11-18 RX ADMIN — Medication 667 MILLIGRAM(S): at 09:24

## 2024-11-18 RX ADMIN — Medication 320 MILLIGRAM(S): at 23:59

## 2024-11-18 RX ADMIN — HEPARIN SODIUM 5000 UNIT(S): 1000 INJECTION INTRAVENOUS; SUBCUTANEOUS at 21:33

## 2024-11-18 RX ADMIN — Medication 1 DROP(S): at 21:33

## 2024-11-18 RX ADMIN — ACETYLCYSTEINE 4 MILLILITER(S): 200 INHALANT RESPIRATORY (INHALATION) at 06:18

## 2024-11-18 RX ADMIN — Medication 667 MILLIGRAM(S): at 13:36

## 2024-11-18 RX ADMIN — IPRATROPIUM BROMIDE AND ALBUTEROL SULFATE 3 MILLILITER(S): .5; 2.5 SOLUTION RESPIRATORY (INHALATION) at 16:50

## 2024-11-18 RX ADMIN — ERYTHROMYCIN 1 APPLICATION(S): 5 OINTMENT OPHTHALMIC at 00:00

## 2024-11-18 RX ADMIN — POLYETHYLENE GLYCOL 3350 17 GRAM(S): 17 POWDER, FOR SOLUTION ORAL at 19:03

## 2024-11-18 RX ADMIN — ACETYLCYSTEINE 4 MILLILITER(S): 200 INHALANT RESPIRATORY (INHALATION) at 16:50

## 2024-11-18 RX ADMIN — OFLOXACIN 1 DROP(S): 3 SOLUTION OPHTHALMIC at 00:01

## 2024-11-18 RX ADMIN — Medication 15 MILLILITER(S): at 07:03

## 2024-11-18 RX ADMIN — IPRATROPIUM BROMIDE AND ALBUTEROL SULFATE 3 MILLILITER(S): .5; 2.5 SOLUTION RESPIRATORY (INHALATION) at 06:17

## 2024-11-18 RX ADMIN — Medication 650 MILLIGRAM(S): at 23:52

## 2024-11-18 RX ADMIN — SODIUM ZIRCONIUM CYCLOSILICATE 5 GRAM(S): 5 POWDER, FOR SUSPENSION ORAL at 11:45

## 2024-11-18 RX ADMIN — HEPARIN SODIUM 5000 UNIT(S): 1000 INJECTION INTRAVENOUS; SUBCUTANEOUS at 07:01

## 2024-11-18 RX ADMIN — CEFEPIME 100 MILLIGRAM(S): 2 INJECTION, POWDER, FOR SOLUTION INTRAVENOUS at 07:01

## 2024-11-18 RX ADMIN — Medication 1 APPLICATION(S): at 07:02

## 2024-11-18 RX ADMIN — Medication 650 MILLIGRAM(S): at 03:00

## 2024-11-18 RX ADMIN — Medication 667 MILLIGRAM(S): at 17:36

## 2024-11-18 RX ADMIN — Medication 1 DROP(S): at 17:36

## 2024-11-18 RX ADMIN — Medication 650 MILLIGRAM(S): at 13:37

## 2024-11-18 RX ADMIN — Medication 650 MILLIGRAM(S): at 02:11

## 2024-11-18 RX ADMIN — Medication 320 MILLIGRAM(S): at 11:44

## 2024-11-18 NOTE — PROGRESS NOTE ADULT - SUBJECTIVE AND OBJECTIVE BOX
INTERVAL HISTORY: HPI:  Unknown age male, unknown past medical history (reported stroke and MI by coworkers) presented to [facility] with AMS, Pt was working at [employer] when he was found down by coworkers. EMS called and pt brought to [facility] ED. Intubated, sedated, started on cardene for SBPs in 200s. CT head showed brain stem bleed. Transferred to NSICU for further management.  (30 Sep 2024 12:55)      MEDICATIONS  (STANDING):  acetylcysteine 10%  Inhalation 4 milliLiter(s) Inhalation every 4 hours  albuterol/ipratropium for Nebulization 3 milliLiter(s) Nebulizer every 4 hours  amLODIPine   Tablet 10 milliGRAM(s) Oral daily  artificial tears (preservative free) Ophthalmic Solution 1 Drop(s) Right EYE every 4 hours  calcium acetate 667 milliGRAM(s) Oral three times a day with meals  cefepime   IVPB 2000 milliGRAM(s) IV Intermittent every 12 hours  chlorhexidine 0.12% Liquid 15 milliLiter(s) Oral Mucosa every 12 hours  doxazosin 8 milliGRAM(s) Oral at bedtime  erythromycin   Ointment 1 Application(s) Right EYE every 6 hours  heparin   Injectable 5000 Unit(s) SubCutaneous every 8 hours  insulin lispro (ADMELOG) corrective regimen sliding scale   SubCutaneous every 6 hours  ofloxacin 0.3% Solution 1 Drop(s) Right EYE every 6 hours  pantoprazole   Suspension 40 milliGRAM(s) Oral daily  petrolatum Ophthalmic Ointment 1 Application(s) Both EYES two times a day  polyethylene glycol 3350 17 Gram(s) Oral two times a day  sodium zirconium cyclosilicate 5 Gram(s) Oral daily    MEDICATIONS  (PRN):  acetaminophen   Oral Liquid .. 650 milliGRAM(s) Oral every 6 hours PRN Temp greater or equal to 38C (100.4F), Mild Pain (1 - 3)      Drug Dosing Weight  Height (cm): 172.7 (30 Sep 2024 08:39)  Weight (kg): 80 (30 Sep 2024 09:04)  BMI (kg/m2): 26.8 (30 Sep 2024 09:04)  BSA (m2): 1.94 (30 Sep 2024 09:04)    PAST MEDICAL & SURGICAL HISTORY:  Acute myocardial infarction  CVA (cerebral vascular accident)    REVIEW OF SYSTEMS: [ ] Unable to Assess due to neurologic exam   [ ] All ROS addressed below are non-contributory, except:  Neuro: [ ] Headache [ ] Back pain [ ] Numbness [ ] Weakness [ ] Ataxia [ ] Dizziness [ ] Aphasia [ ] Dysarthria [ ] Visual disturbance  Resp: [ ] Shortness of breath/dyspnea, [ ] Orthopnea [ ] Cough  CV: [ ] Chest pain [ ] Palpitation [ ] Lightheadedness [ ] Syncope  Renal: [ ] Thirst [ ] Edema  GI: [ ] Nausea [ ] Emesis [ ] Abdominal pain [ ] Constipation [ ] Diarrhea  Hem: [ ] Hematemesis [ ] bright red blood per rectum  ID: [ ] Fever [ ] Chills [ ] Dysuria  ENT: [ ] Rhinorrhea    PHYSICAL EXAM:  General: No Acute Distress   Neurological: eyes open spontaneously, tracks vertically, RUE wiggles fingers to commands,, b/l LE WD, LUE extension, RUE WD, pupils b/l 3mm reactive, cough gag (+)  Pulmonary: Clear to Auscultation, No Rales, No Rhonchi, No Wheezes   Cardiovascular: S1, S2, Regular Rate and Rhythm   Gastrointestinal: Soft, Nontender, Nondistended Extremities: No calf tenderness       ICU Vital Signs Last 24 Hrs  T(C): 36.8 (18 Nov 2024 09:02), Max: 38.5 (17 Nov 2024 17:00)  T(F): 98.2 (18 Nov 2024 09:02), Max: 101.3 (17 Nov 2024 17:00)  HR: 92 (18 Nov 2024 10:00) (77 - 148)  BP: 137/93 (18 Nov 2024 10:00) (120/64 - 160/100)  BP(mean): 110 (18 Nov 2024 10:00) (83 - 123)  RR: 18 (18 Nov 2024 10:00) (15 - 24)  SpO2: 99% (18 Nov 2024 10:00) (96% - 100%)    O2 Parameters below as of 18 Nov 2024 10:00  Patient On (Oxygen Delivery Method): ventilator    O2 Concentration (%): 40        I&O's Detail    17 Nov 2024 07:01  -  18 Nov 2024 07:00  --------------------------------------------------------  IN:    Enteral Tube Flush: 260 mL    Free Water: 500 mL    Nepro with Carb Steady: 1140 mL    Sodium Chloride 0.9% Bolus: 2000 mL  Total IN: 3900 mL    OUT:    Indwelling Catheter - Urethral (mL): 2830 mL  Total OUT: 2830 mL    Total NET: 1070 mL    Mode: CPAP with PS  FiO2: 40  PEEP: 5  PS: 10  MAP: 7  PIP: 11        LABS:  CBC Full  -  ( 18 Nov 2024 05:30 )  WBC Count : 9.07 K/uL  RBC Count : 3.33 M/uL  Hemoglobin : 9.9 g/dL  Hematocrit : 31.8 %  Platelet Count - Automated : 179 K/uL  Mean Cell Volume : 95.5 fl  Mean Cell Hemoglobin : 29.7 pg  Mean Cell Hemoglobin Concentration : 31.1 g/dL  Auto Neutrophil # : x  Auto Lymphocyte # : x  Auto Monocyte # : x  Auto Eosinophil # : x  Auto Basophil # : x  Auto Neutrophil % : x  Auto Lymphocyte % : x  Auto Monocyte % : x  Auto Eosinophil % : x  Auto Basophil % : x    11-18    144  |  115[H]  |  50[H]  ----------------------------<  109[H]  4.5   |  20[L]  |  1.80[H]    Ca    9.0      18 Nov 2024 05:30  Phos  3.7     11-18  Mg     2.3     11-18        Urinalysis Basic - ( 18 Nov 2024 05:30 )    Color: x / Appearance: x / SG: x / pH: x  Gluc: 109 mg/dL / Ketone: x  / Bili: x / Urobili: x   Blood: x / Protein: x / Nitrite: x   Leuk Esterase: x / RBC: x / WBC x   Sq Epi: x / Non Sq Epi: x / Bacteria: x    RADIOLOGY & ADDITIONAL STUDIES:

## 2024-11-18 NOTE — PROGRESS NOTE ADULT - ASSESSMENT
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; respiratory failure s/p trach/peg readmit for hypoxia 2/2 presumed mucous plugging      NEURO:  NIHSS >25; poor functional recovery anticipated; family aware   -neuro check q4  -PT/OT   -communication to be addressed by speech    PULMONARY:  CPAP trials pt w/ trach   5/5   Mucomyst & Duoneb to q6   wean Fio2     CARDIOVASCULAR:  monitor on telemetry   vitals q1  sbp goal 100-160 ; diastolic pressure >80; will start HCTZ 12.5mg daily; c/w amlodipine 10mg     GASTROENTEROLOGY:  PEG in place; c/w tube feeds (nephro)  ensure BMs w/ Miralax & senna-lokelma for hyperkalemia 2/2 renal disease     RENAL/:  ANIKA on CKD   -c/w calcium acetate   -free water 250mg q6hrs   -check BMP qd  -strict i/o's ;  -Na goal 135-145  -d/c gabriel; initiate TOV     ENDOCRINE:  Diabetes Mellitus  A1c- 5.3  Monitor FSG q6 hrs while w/ tube feeds   Hypothyroidism   TSH-1.23      HEME/ONC:  DVT ppx:   b/l SCDs    INFECTIOUS:   febrile 11/17; blood culture & sputum sent NGTD  s/p cefepime for klebsiella bacteremia ?start bactrim ; Tracheal aspirate w/ Stenotrophomonas ID to follow for kali ship       DISPO: recommended for IRON- (only Emergency Medicaid) pending RUCOL ; social work & CM following

## 2024-11-18 NOTE — PROGRESS NOTE ADULT - SUBJECTIVE AND OBJECTIVE BOX
Neurocritical Care Attending Note    HPI per chart review  -  45y/o M  unknown past medical history (reported stroke and MI by coworkers) presented to [facility] with AMS, Pt was working at [employer] when he was found down by coworkers. EMS called and pt brought to [facility] ED. Intubated, sedated, started on cardene for SBPs in 200s. CT head showed brain stem bleed. Transferred to NSICU for further management.  (30 Sep 2024 12:55)  ICH Score 3    Hospital Course/Interval Events  9/30: transferred from [facility]. A line placed. Versed dc'd. Cy Rader at bedside, states pt has family in [place], cannot confirm medications or PMH other than stroke and MI. 250cc bolus 3% given. LR switched to NS. hydralazine 25q8 started, 3% started, switched propofol to precedex   10/1: stability CTH done. Added labetalol, started TF. Palliative consulted. ethics consulted to determine surrogate. febrile 103, pan cx sent  10/2: BD 2, GEORGE overnight. TF resumed. Desatt'd to 80s, FiO2 inc. to 50. Fentanyl given, ABG, CXR ordered. Maxxed on precedex, started on propofol for DARIEN -4 - -5. Precedex dc'd. Duonebs, mucomyst, hypertonic added. 3% dc'd. Cardene dc'd. Start vanc/CTX. Increased labetalol 200q8. MRSA negative, dc'd vanc. ETT pulled back 2cm x 2, good positioning after confirmatory chest xray. Ethics attempting to establish HCP with family. Na 159, starting FW 250q6 for range 150-155.   10/3: BD3, GEORGE o/n, neuro stable. Na elevating, FW increased to 300q6. Dc'd bowel reg for diarrhea. vEEG started. SQH 5000q8 tonight.   10/4: BD 4, albumn bolus, incr. LR to 80 2/2 incr. in Cr, LR to 100cc/hr for uptrending Cr. Started 7% hypersal for 48hrs and SL atropine for inline/oral thick secretions. Dc'd CTX and started ancef for MSSA in the sputum. Nephrology consulted for CKD, f/u recs. SBP 170s, given hydralazine 10mg IVP.   10/5: BD5, o/n 10mg IVP hydralazine given for SBP 170s and started on hydralazine 25q8 via OGT. 10mg IV push labetalol for SBP > 160s. RT placed for diarrhea.   10/6: BD6, o/n FW increased to 350q4 per nephrology recs. IV tylenol for temp 100.6, SBp 160s presumed uncomfortable.   10/7: BD7, overnight pancultured for temp 101.8F.   10/8: BD8. GEORGE. Cr bumped. decreased LR to 75cc/hr. Adding simethicone ATC. incr hydralazine 50mgTID. Incr labetalol 300mgTID. Na 145, decreased FWF to 250q6. Start precedex. FENa consistent with intrinsic kidney injury. Pend repeat renal US. Retaining up to 1.3L, bladder scans q6, straight cath PRN  10/9: BD 9. GEORGE overnight. Neuro stable. abd xray for distention w non-specific gas pattern, OGT to LIWS for morning. duonebs/mucomyst to q8 for improving secretions. Changed tube feeds to Jevity 1.5 20cc/hr, low rate due to abdominal distention, nepro dense and more difficult to digest. Tolerating CPAP, confirmed by ABG.   10/10: BD 10. GEORGE overnight. Neuro stable. (+) gabriel for urinary retention on bladder scan. inc TF to goal rate of 40cc/hr. family leaning toward pursuing trach/PEG. 1/2 amp for FS 81.   10/11: BD 11. GEORGE overnight. Neuro stable. Trach/PEG consults placed.   10/12: BD 12. GEORGE overnight. Neuro stable. MRI brain complete.   10/13: BD 13. Increase flomax. Hold SQH after PM dose for trach tm. IVL.   10/14: BD 14. GEORGE overnight, remains on AC/VC. Gabriel placed for urinary retention. Dc'd free water.  S/p trach with pulm. NGT placed and CXR confirmed in good position.   10/15: BD 15, GEORGE ovn. resumed feeds. spiked 101, pan cx sent.   10/16: BD 16. GEORGE ovn. Lokelma 5mg for K+ 5.4. Started vanc q 24/zosyn for empiric PNA coverage, IVF to 100/hr. PEG held for fever.   10/17: BD 17,  ordered serum osm and urine osm for am. Started sinemet for neurostimulation. Increased cardura to 0.8. Started FW 100q4, dc'd IVF. MRSA negative, dc'd vanc. NGT replaced d/t coiling.   10/18: BD 18, GEORGE overnight, neuro stable. Amantadine added for neurostim. zosyn changed to unasyn for acinetobacter baumannii, failed TOV and required SC  10/19: BD 19, GEORGE ovn. cardura 2mg added for retention. labetalol decreased 200q8, hydralazine decreased 25q8. Gabriel replaced.   10/20: BD20, GEORGE overnight. NGT dislodged, replaced. PEG tomorrow w/ gen surg, FW increased to 150q4 and labetalol decreased to 100q8, lokelma given for hyperkalemia.   10/21: BD 21. POD0 PEG placement with Gen surg. decr labetolol to 50q8, incr. cardura to 0.4, started lokelma and phoslo, dc gabriel POD0 PEG placement with Gen surg.  10/22: BD 22. Plan to start TF today via PEG. dc labetalol, Following ophtho recs. Increased apnea settings - found to be in cheyne-moe respiration. CPAP 5/5.  10/23: BD 23. hydralazine d/c'd, trach collar trial today. Rectal tube placed at 6am.  10/24: BD24, o/n lokelma held due to diarrhea. Free water 100q6 resumed. dc'd tamsulosin, amantadine. Incr'd cardura to 8mg qhs. Dc'd FW. Switched jevity to nepro. gabriel placed for high urine output. Started SL atropine for oral secretions. Dc'd free water.  10/25: BD25, o/n decreased suctioning requirements to > q4hrs, GEORGE. Cr improving, cont phoslo, lokelma held at this time. Gabriel placed yest, cont. Tolerating trach collar. Given 500cc plasmalyte bolus for ANIKA. Dc'd sinemet.   10/26: BD26, o/n resumed lokelma 5mg daily and resumed 100cc free water q6hrs. Change in neuro status with new right pupillary dilation with anisocoria (right pupil 6mm fixed and left pupil 3mm briskly reactive). Given 23.4% NaCl bullet, taken for emergent CTH showing mostly resolved pontine hemorrhage, continued brainstem hypodensity likely edema d/t hemorrhage, no new hemorrhage or infarct, no herniation, mild increase in size of left lateral ventricle. Vitals remaine stable. Na goal > 140.   10/27: BD27, o/n GEORGE.Neuro stable. Pend stepdown with airway bed.   10/28: BD 28. GEORGE overnight. Neuro stable. Miralax ordered. Gabriel removed, pending TOV.  10/29: BD 29. GEORGE o/n. Given 2L NS over 8 hrs for increased BUN/Cr ratio. Gabriel placed for frequent straight cath.   10/30: BD 30.   10/31: BD 31. GEORGE overnight. Na 149, increased free water to 200q6. 1L NS for uptrending BUN.   11/1: BD 32. GEORGE overnight. Given 1L NS for dehydration. Na 146, increased FW to 250q6.   11/2: BD 33 GEORGE overnight, neuro stable, given 500cc bolus for net negative status and tachycardia   11/3: BD 34, GEORGE overnight, neuro stable. Patient remains tachycardic, EKG showing sinus tachycardia, given additional 500cc NS bolus. Febrile to 101.9F, pan cultured (without UA), CXR WNL, given tylenol.   11/4: BD 35, GEORGE overnight, neuro stable. Given 1L NS for tachycardia. sputum (+) for stenotropohomas maltophilia.   11/5: BD 36 GEORGE overnight, neuro stable. Vancomycin dc'd. Chest PT BID. ID consulted, cont zosyn.  11/6: BD 37. blood cx + klebsiella dc zosyn changed to cefepime, CTAP ordered, rpt blood cx sent.    11/7: BD 38. Pending CT A/P, given 250cc bolus and starting maintenance fluids overnight. Pending CT A/P after bolus   11/8: BD 39. CT CAP negative for infection.   11/9: BD 40. GEORGE overnight.  11/10: BD 41. GEORGE overnight. desat to 85 on trach collar, O2 inc to 10L and 100%, O2 sat inc to 95. pt tachy to 110s, euvolemic. given tylenol. ABG and CXR ordered. spiked fever, pancultured, RVP negative. AM ABG w pO2 79, rpt w pO2 79. pt appears comfortable, satting 94%.   11/11: BD 42. GEORGE overnight. pt became tachy to 130s, desat to 90 on 100% FiO2 and 10L. suctioned, (+) productive cough. temp 101.4, given 1g IV tylenol and 500cc NS bolus for euvolemia. fever and HR downtrending. LE dopplers negative for dvt  11/12: BD 43, GEORGE ovn, fever and HR downtrending, satting 97% 70% FIO2  11/13: BD 44, GEORGE ovn. started standing tylenol x24 hours for tachycardia. desat to 80s, o2 increased. CXR stable, pending CTA PE protocol.   11/14: BD 45, GEORGE overnight, neuro stable. resp therapy dec FiO2 to 70%.   11/15: BD 46, GEORGE overnight, neuro stable.  Rapid called for desaturation 30s, tachycardic 140s. Patient bagged, 100% fio2, heavily suctioned. CXR/POCUS unremarkable. ABG c/w desaturation. WBC 14.71. Afebrile. O2 improved to 90s and patient upgraded to ICU. ABG paO2 30s improved to 89 on vent. IV Tylenol x 1, sputum sent. Start protonix while on vent. .   11/16: BD 47. POCUS showed collapsable IVCF, given 1L bolus. Vanco/Cefpime added empriic for PNA, NGT feeds restarted. MRSA swab neg, Vanc DC'd.   11/17: BD 48. GEORGE overnight. 1l bolus for tachycardia. Spiked to 101, cultured. 500cc bolus for tachycardia, tachy to 148 given 25mcg fentanyl, 250cc albumin, 1.5L bolus. 5 IV lopressor with response HR to 100s. +Stenotrophomonas on sputum cx.   11/18: BD 49. GEORGE overnight. Consulted ID, cefepime switched to bactrim x7days. Started hydrochlorothiazine 12.5mg daily.  11/19: BD 50.         MEDICATIONS  (STANDING):  acetylcysteine 10%  Inhalation 4 milliLiter(s) Inhalation every 4 hours  albuterol/ipratropium for Nebulization 3 milliLiter(s) Nebulizer every 4 hours  amLODIPine   Tablet 10 milliGRAM(s) Oral daily  artificial tears (preservative free) Ophthalmic Solution 1 Drop(s) Right EYE every 4 hours  calcium acetate 667 milliGRAM(s) Oral three times a day with meals  cefepime   IVPB 2000 milliGRAM(s) IV Intermittent every 12 hours  chlorhexidine 0.12% Liquid 15 milliLiter(s) Oral Mucosa every 12 hours  doxazosin 8 milliGRAM(s) Oral at bedtime  erythromycin   Ointment 1 Application(s) Right EYE every 6 hours  heparin   Injectable 5000 Unit(s) SubCutaneous every 8 hours  insulin lispro (ADMELOG) corrective regimen sliding scale   SubCutaneous every 6 hours  ofloxacin 0.3% Solution 1 Drop(s) Right EYE every 6 hours  pantoprazole   Suspension 40 milliGRAM(s) Oral daily  petrolatum Ophthalmic Ointment 1 Application(s) Both EYES two times a day  polyethylene glycol 3350 17 Gram(s) Oral two times a day  sodium zirconium cyclosilicate 5 Gram(s) Oral daily    MEDICATIONS  (PRN):  acetaminophen   Oral Liquid .. 650 milliGRAM(s) Oral every 6 hours PRN Temp greater or equal to 38C (100.4F), Mild Pain (1 - 3)    Physical Exam  ICU Vital Signs Last 24 Hrs  T(C): 36.7 (19 Nov 2024 00:00), Max: 38.3 (18 Nov 2024 15:00)  T(F): 98 (19 Nov 2024 00:00), Max: 101 (18 Nov 2024 15:00)  HR: 85 (19 Nov 2024 01:00) (65 - 102)  BP: 156/104 (19 Nov 2024 01:00) (131/89 - 160/100)  BP(mean): 125 (19 Nov 2024 01:00) (106 - 125)  RR: 16 (18 Nov 2024 20:00) (15 - 19)  SpO2: 97% (19 Nov 2024 01:00) (95% - 100%)      GENERAL: Calm, lying in bed, trach in place to vent on PS  EENT: Anicteric   CARDIOVASCULAR: S1/S2, RRR  PULMONARY: Diminished, copious secretions  ABDOMEN: soft, nontender with normoactive bowel sounds  EXTREMITIES: no edema  SKIN: no rash  Neurological Exam   Mental Status: Awake, eyes spontaneously open, tracks and attends briefly, following 1 step command inconsistently (sticking tongue out)  CNs: pupils left >> right reactive to light right sluggish, left facial weakness,   Motor: left upper and lower extremity trace extension to noxious, right upper and lower trace withdrawal to noxious

## 2024-11-18 NOTE — PROGRESS NOTE ADULT - SUBJECTIVE AND OBJECTIVE BOX
S/Overnight events:  BD 49. GEORGE overnight.    Hospital Course:   9/30: transferred from [facility]. A line placed. Versed dc'd. Cy Rader at bedside, states pt has family in [place], cannot confirm medications or PMH other than stroke and MI. 250cc bolus 3% given. LR switched to NS. hydralazine 25q8 started, 3% started, switched propofol to precedex   10/1: stability CTH done. Added labetalol, started TF. Palliative consulted. ethics consulted to determine surrogate. febrile 103, pan cx sent  10/2: BD 2, GEORGE overnight. TF resumed. Desatt'd to 80s, FiO2 inc. to 50. Fentanyl given, ABG, CXR ordered. Maxxed on precedex, started on propofol for DARIEN -4 - -5. Precedex dc'd. Duonebs, mucomyst, hypertonic added. 3% dc'd. Cardene dc'd. Start vanc/CTX. Increased labetalol 200q8. MRSA negative, dc'd vanc. ETT pulled back 2cm x 2, good positioning after confirmatory chest xray. Ethics attempting to establish HCP with family. Na 159, starting FW 250q6 for range 150-155.   10/3: BD3, GEORGE o/n, neuro stable. Na elevating, FW increased to 300q6. Dc'd bowel reg for diarrhea. vEEG started. SQH 5000q8 tonight.   10/4: BD 4, albumn bolus, incr. LR to 80 2/2 incr. in Cr, LR to 100cc/hr for uptrending Cr. Started 7% hypersal for 48hrs and SL atropine for inline/oral thick secretions. Dc'd CTX and started ancef for MSSA in the sputum. Nephrology consulted for CKD, f/u recs. SBP 170s, given hydralazine 10mg IVP.   10/5: BD5, o/n 10mg IVP hydralazine given for SBP 170s and started on hydralazine 25q8 via OGT. 10mg IV push labetalol for SBP > 160s. RT placed for diarrhea.   10/6: BD6, o/n FW increased to 350q4 per nephrology recs. IV tylenol for temp 100.6, SBp 160s presumed uncomfortable.   10/7: BD7, overnight pancultured for temp 101.8F.   10/8: BD8. GEORGE. Cr bumped. decreased LR to 75cc/hr. Adding simethicone ATC. incr hydralazine 50mgTID. Incr labetalol 300mgTID. Na 145, decreased FWF to 250q6. Start precedex. FENa consistent with intrinsic kidney injury. Pend repeat renal US. Retaining up to 1.3L, bladder scans q6, straight cath PRN  10/9: BD 9. GEORGE overnight. Neuro stable. abd xray for distention w non-specific gas pattern, OGT to LIWS for morning. duonebs/mucomyst to q8 for improving secretions. Changed tube feeds to Jevity 1.5 20cc/hr, low rate due to abdominal distention, nepro dense and more difficult to digest. Tolerating CPAP, confirmed by ABG.   10/10: BD 10. GEORGE overnight. Neuro stable. (+) gabriel for urinary retention on bladder scan. inc TF to goal rate of 40cc/hr. family leaning toward pursuing trach/PEG. 1/2 amp for FS 81.   10/11: BD 11. GEORGE overnight. Neuro stable. Trach/PEG consults placed.   10/12: BD 12. GEORGE overnight. Neuro stable. MRI brain complete.   10/13: BD 13. Increase flomax. Hold SQH after PM dose for trach tm. IVL.   10/14: BD 14. GEORGE overnight, remains on AC/VC. Gabriel placed for urinary retention. Dc'd free water.  S/p trach with pulm. NGT placed and CXR confirmed in good position.   10/15: BD 15, GEORGE ovn. resumed feeds. spiked 101, pan cx sent.   10/16: BD 16. GEORGE ovn. Lokelma 5mg for K+ 5.4. Started vanc q 24/zosyn for empiric PNA coverage, IVF to 100/hr. PEG held for fever.   10/17: BD 17,  ordered serum osm and urine osm for am. Started sinemet for neurostimulation. Increased cardura to 0.8. Started FW 100q4, dc'd IVF. MRSA negative, dc'd vanc. NGT replaced d/t coiling.   10/18: BD 18, GEORGE overnight, neuro stable. Amantadine added for neurostim. zosyn changed to unasyn for acinetobacter baumannii, failed TOV and required SC  10/19: BD 19, GEORGE ovn. cardura 2mg added for retention. labetalol decreased 200q8, hydralazine decreased 25q8. Gabriel replaced.   10/20: BD20, GEORGE overnight. NGT dislodged, replaced. PEG tomorrow w/ gen surg, FW increased to 150q4 and labetalol decreased to 100q8, lokelma given for hyperkalemia.   10/21: BD 21. POD0 PEG placement with Gen surg. decr labetolol to 50q8, incr. cardura to 0.4, started lokelma and phoslo, dc gabriel POD0 PEG placement with Gen surg.  10/22: BD 22. Plan to start TF today via PEG. dc labetalol, Following ophtho recs. Increased apnea settings - found to be in cheyne-moe respiration. CPAP 5/5.  10/23: BD 23. hydralazine d/c'd, trach collar trial today. Rectal tube placed at 6am.  10/24: BD24, o/n lokelma held due to diarrhea. Free water 100q6 resumed. dc'd tamsulosin, amantadine. Incr'd cardura to 8mg qhs. Dc'd FW. Switched jevity to nepro. gabriel placed for high urine output. Started SL atropine for oral secretions. Dc'd free water.  10/25: BD25, o/n decreased suctioning requirements to > q4hrs, GEORGE. Cr improving, cont phoslo, lokelma held at this time. Gabriel placed yest, cont. Tolerating trach collar. Given 500cc plasmalyte bolus for ANIKA. Dc'd sinemet.   10/26: BD26, o/n resumed lokelma 5mg daily and resumed 100cc free water q6hrs. Change in neuro status with new right pupillary dilation with anisocoria (right pupil 6mm fixed and left pupil 3mm briskly reactive). Given 23.4% NaCl bullet, taken for emergent CTH showing mostly resolved pontine hemorrhage, continued brainstem hypodensity likely edema d/t hemorrhage, no new hemorrhage or infarct, no herniation, mild increase in size of left lateral ventricle. Vitals remaine stable. Na goal > 140.   10/27: BD27, o/n GEORGE.Neuro stable. Pend stepdown with airway bed.   10/28: BD 28. GEORGE overnight. Neuro stable. Miralax ordered. Gabriel removed, pending TOV.  10/29: BD 29. GEORGE o/n. Given 2L NS over 8 hrs for increased BUN/Cr ratio. Gabriel placed for frequent straight cath.   10/30: BD 30.   10/31: BD 31. GEORGE overnight. Na 149, increased free water to 200q6. 1L NS for uptrending BUN.   11/1: BD 32. GEORGE overnight. Given 1L NS for dehydration. Na 146, increased FW to 250q6.   11/2: BD 33 GEORGE overnight, neuro stable, given 500cc bolus for net negative status and tachycardia   11/3: BD 34, GEORGE overnight, neuro stable. Patient remains tachycardic, EKG showing sinus tachycardia, given additional 500cc NS bolus. Febrile to 101.9F, pan cultured (without UA), CXR WNL, given tylenol.   11/4: BD 35, GEORGE overnight, neuro stable. Given 1L NS for tachycardia. sputum (+) for stenotropohomas maltophilia.   11/5: BD 36 GEORGE overnight, neuro stable. Vancomycin dc'd. Chest PT BID. ID consulted, cont zosyn.  11/6: BD 37. blood cx + klebsiella dc zosyn changed to cefepime, CTAP ordered, rpt blood cx sent.    11/7: BD 38. Pending CT A/P, given 250cc bolus and starting maintenance fluids overnight. Pending CT A/P after bolus   11/8: BD 39. CT CAP negative for infection.   11/9: BD 40. GEORGE overnight.  11/10: BD 41. GEORGE overnight. desat to 85 on trach collar, O2 inc to 10L and 100%, O2 sat inc to 95. pt tachy to 110s, euvolemic. given tylenol. ABG and CXR ordered. spiked fever, pancultured, RVP negative. AM ABG w pO2 79, rpt w pO2 79. pt appears comfortable, satting 94%.   11/11: BD 42. GEORGE overnight. pt became tachy to 130s, desat to 90 on 100% FiO2 and 10L. suctioned, (+) productive cough. temp 101.4, given 1g IV tylenol and 500cc NS bolus for euvolemia. fever and HR downtrending. LE dopplers negative for dvt  11/12: BD 43, GEORGE ovn, fever and HR downtrending, satting 97% 70% FIO2  11/13: BD 44, GEORGE ovn. started standing tylenol x24 hours for tachycardia. desat to 80s, o2 increased. CXR stable, pending CTA PE protocol.   11/14: BD 45, GEORGE overnight, neuro stable. resp therapy dec FiO2 to 70%.   11/15: BD 46, GEORGE overnight, neuro stable.  Rapid called for desaturation 30s, tachycardic 140s. Patient bagged, 100% fio2, heavily suctioned. CXR/POCUS unremarkable. ABG c/w desaturation. WBC 14.71. Afebrile. O2 improved to 90s and patient upgraded to ICU. ABG paO2 30s improved to 89 on vent. IV Tylenol x 1, sputum sent. Start protonix while on vent.   11/16: BD 47. POCUS showed collapsable IVCF, given 1L bolus. Vanco/Cefpime added empriic for PNA, NGT feeds restarted. MRSA swab neg, Vanc DC'd.   11/17: BD 48. GEORGE overnight. 1l bolus for tachycardia. Spiked to 101, cultured. 500cc bolus for tachycardia, tachy to 148 given 25mcg fentanyl, 250cc albumin, 1.5L bolus. 5 IV lopressor with response HR to 100s. +Stenotrophomonas on sputum cx.     Vital Signs Last 24 Hrs  T(C): 36.9 (17 Nov 2024 22:00), Max: 38.5 (17 Nov 2024 17:00)  T(F): 98.5 (17 Nov 2024 22:00), Max: 101.3 (17 Nov 2024 17:00)  HR: 94 (18 Nov 2024 00:00) (94 - 148)  BP: 148/101 (18 Nov 2024 00:00) (120/64 - 156/103)  BP(mean): 120 (18 Nov 2024 00:00) (83 - 123)  RR: 18 (18 Nov 2024 00:00) (14 - 24)  SpO2: 100% (18 Nov 2024 00:00) (96% - 100%)    Parameters below as of 18 Nov 2024 00:00  Patient On (Oxygen Delivery Method): ventilator    O2 Concentration (%): 60    I&O's Detail    16 Nov 2024 07:01  -  17 Nov 2024 07:00  --------------------------------------------------------  IN:    Free Water: 1000 mL    IV PiggyBack: 50 mL    Nepro with Carb Steady: 520 mL    sodium chloride 0.9%: 1500 mL    Sodium Chloride 0.9% Bolus: 1000 mL  Total IN: 4070 mL    OUT:    Indwelling Catheter - Urethral (mL): 1915 mL  Total OUT: 1915 mL    Total NET: 2155 mL      17 Nov 2024 07:01  -  18 Nov 2024 00:16  --------------------------------------------------------  IN:    Enteral Tube Flush: 260 mL    Nepro with Carb Steady: 900 mL    Sodium Chloride 0.9% Bolus: 2000 mL  Total IN: 3160 mL    OUT:    Indwelling Catheter - Urethral (mL): 2130 mL  Total OUT: 2130 mL    Total NET: 1030 mL    I&O's Summary    16 Nov 2024 07:01  -  17 Nov 2024 07:00  --------------------------------------------------------  IN: 4070 mL / OUT: 1915 mL / NET: 2155 mL    17 Nov 2024 07:01  -  18 Nov 2024 00:16  --------------------------------------------------------  IN: 3160 mL / OUT: 2130 mL / NET: 1030 mL    PHYSICAL EXAM:  Constitutional: Pt found in bed in NAD   ENT: PERRL, vertical EOMi, tracks vertically, R 3mm brisk, L 3mm brisk   Respiratory: +trach to vent, breathing non-labored, symmetrical chest wall movement  Cardiovascuar: RRR, no murmurs  Gastrointestinal: +PEG, abdomen soft, non tender  Neurological:  AAOX0. Intermittently follows commands   Motor: RUE bicep 0/5, tricep 4/5, HG 3/5, RLE wiggles toes to command, LUE extends to noxious, LLE withdraws from noxious  Sensation: unable to assess   Pronator Drift: unable to assess    DIET:  [] NPO  [] Mechanical  [X] Tube feeds    LABS:    Urinalysis Basic - ( 17 Nov 2024 05:30 )    Color: x / Appearance: x / SG: x / pH: x  Gluc: 97 mg/dL / Ketone: x  / Bili: x / Urobili: x   Blood: x / Protein: x / Nitrite: x   Leuk Esterase: x / RBC: x / WBC x   Sq Epi: x / Non Sq Epi: x / Bacteria: x      CAPILLARY BLOOD GLUCOSE    POCT Blood Glucose.: 119 mg/dL (17 Nov 2024 23:27)  POCT Blood Glucose.: 110 mg/dL (17 Nov 2024 16:17)  POCT Blood Glucose.: 88 mg/dL (17 Nov 2024 11:06)  POCT Blood Glucose.: 100 mg/dL (17 Nov 2024 06:00)      Allergies    Allergy Status Unknown    Intolerances      MEDICATIONS:  Antibiotics:  cefepime   IVPB 2000 milliGRAM(s) IV Intermittent every 12 hours    Neuro:  acetaminophen   Oral Liquid .. 650 milliGRAM(s) Oral every 6 hours PRN    Anticoagulation:  heparin   Injectable 5000 Unit(s) SubCutaneous every 8 hours    OTHER:  acetylcysteine 10%  Inhalation 4 milliLiter(s) Inhalation every 4 hours  albuterol/ipratropium for Nebulization 3 milliLiter(s) Nebulizer every 4 hours  amLODIPine   Tablet 10 milliGRAM(s) Oral daily  artificial tears (preservative free) Ophthalmic Solution 1 Drop(s) Right EYE every 4 hours  chlorhexidine 0.12% Liquid 15 milliLiter(s) Oral Mucosa every 12 hours  doxazosin 8 milliGRAM(s) Oral at bedtime  erythromycin   Ointment 1 Application(s) Right EYE every 6 hours  insulin lispro (ADMELOG) corrective regimen sliding scale   SubCutaneous every 6 hours  ofloxacin 0.3% Solution 1 Drop(s) Right EYE every 6 hours  pantoprazole   Suspension 40 milliGRAM(s) Oral daily  petrolatum Ophthalmic Ointment 1 Application(s) Both EYES two times a day  polyethylene glycol 3350 17 Gram(s) Oral two times a day  sodium zirconium cyclosilicate 5 Gram(s) Oral daily    IVF:  calcium acetate 667 milliGRAM(s) Oral three times a day with meals    CULTURES:  Culture Results:   Mixed gram negative rods including  Moderate Stenotrophomonas maltophilia  Commensal iram consistent with body site (11-16 @ 01:53)  Culture Results:   Commensal iram consistent with body site (11-11 @ 05:06)    ASSESSMENT:  45 y/o PMH ?stroke/MI present to [facility] after collapsing at work. Decorticate posturing, vomiting, intubated for airway protection. 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 Transferred to [facility] for further management. s/p trach 10/14. s/p peg 10/21. Re-upgrade to ICU 2/2 desaturation event and suctioning requirements 11/15.     AMS    Handoff    MEWS Score    Acute myocardial infarction    CVA (cerebral vascular accident)    Intracerebral hemorrhage of brain stem    Brainstem stroke    Brain stem stroke syndrome    Brain stem hemorrhage    Brain stem stroke syndrome    Hemorrhagic stroke    Brainstem stroke    Encephalopathy acute    Functional quadriplegia    Advanced care planning/counseling discussion    Encounter for palliative care    Pontine hemorrhage    Neurogenic dysphagia    Chronic respiratory failure    Acute kidney injury superimposed on CKD    Acute urinary retention    Hypertensive emergency    Sepsis, unspecified organism    Sepsis    Gram-negative bacteremia    Percutaneous tracheal puncture    Altered mental status examination    EGD, with PEG    AMS      PLAN:  Neuro:  - neuro q4/vitals q1  - pain control: tylenol prn  - vEEG (10/3-4)- negative, (10/17-10/19) - negative  - CTH 9/30: enlarged pontine hemorrhage, CTH 10/3: stable, CTH 10/25 anisocoric pupils showing mostly resolved pontine hemorrhage, no new hemorrhage or infarct, no herniation  - MRI brain w/ w/o contrast 10/12: parenchymal hemorrhage, acute/subacute R cerebellar stroke    - stroke core measures, stroke neuro signed off    CV:  - -160  - HTN: amlodipine 10 mg qd  - echo (9/30) EF 75%    Resp:  - Trach to vent 60/430/5/12, CPAP as tolerated 5/5  - Secretions: duonebs/mucomyst/chest PT q4h   - CTA chest 11/13 negative for PE, inc ground glass opacities    GI:  - TF via PEG (placed 10/21 by gen surg)  - bowel regimen, last BM 11/17  - CTAP 11/8 negative for infection    - GI ppx: Protonix 40mg daily while on vent     Renal:  - +gabriel replaced 11/7 2/2 urinary retention   - FW flushes 250cc q6hrs   - IVL  - hyperK 10/20: lokelma 5mg qd  - hyperPhos: phoslo 667mg TID   - Urinary retenion: Cardura 8 mg   - CKD: trend BUN/Cr  - renal US 10/1: echogenicity c/w chronic med renal dz, repeat 10/8: inc renal echogeicity, c/f medical renal disease w increased hydro     Endo:  - ISS  -  A1c 5.4    Heme:  - DVT ppx: SCDs, SQH 5000u q8h   - LE dopplers negative 11/11    ID:  - febrile, last pancx 11/16  - +Stenotrophomonas maltophilia PNA: Cefepime (11/16-11/20)  - MRSA swab neg 11/16   - pan cultured 11/3, (+) sputum for stenotrophomas maltophlia, blood cx (+) klebsiella, cefepime 2gq12 (11/6 - 11/12)   - empiric tx: s/p zosyn (11/3-11/6) , s/p vanc  (11/3- 11/5)  - S/p Ancef (10/4-10/14) for PNA, and s/p Unasyn (10/18-10/23) +actinobacter baumanii    MISC:  - ophtho consult for keratitis, rec erythromycin ointment to rt eye q4hrs, ofloxacin ointment to rt eye QID, artificial tears to rt eye q2hrs, moisture chamber at bedtime    Dispo: regional, full code, pending placement and emergency medicaid     D/w Dr. D'Amico and Abdirizak

## 2024-11-18 NOTE — PROGRESS NOTE ADULT - SUBJECTIVE AND OBJECTIVE BOX
INTERVAL HPI/OVERNIGHT EVENTS:    ID consulted to evaluate stenotrophomonas growing on tracheal aspirate.  Patient had fever overnight.  Afebrile today.  Very minimal secretions per nursing.     ROS:    unable to obtain         ANTIBIOTICS/RELEVANT:    MEDICATIONS  (STANDING):  acetylcysteine 10%  Inhalation 4 milliLiter(s) Inhalation every 6 hours  albuterol/ipratropium for Nebulization 3 milliLiter(s) Nebulizer every 6 hours  amLODIPine   Tablet 10 milliGRAM(s) Oral daily  artificial tears (preservative free) Ophthalmic Solution 1 Drop(s) Right EYE every 4 hours  calcium acetate 667 milliGRAM(s) Oral three times a day with meals  chlorhexidine 0.12% Liquid 15 milliLiter(s) Oral Mucosa every 12 hours  doxazosin 8 milliGRAM(s) Oral at bedtime  heparin   Injectable 5000 Unit(s) SubCutaneous every 8 hours  hydrochlorothiazide 12.5 milliGRAM(s) Oral daily  insulin lispro (ADMELOG) corrective regimen sliding scale   SubCutaneous every 6 hours  pantoprazole   Suspension 40 milliGRAM(s) Oral daily  petrolatum Ophthalmic Ointment 1 Application(s) Both EYES two times a day  polyethylene glycol 3350 17 Gram(s) Oral two times a day  sodium zirconium cyclosilicate 5 Gram(s) Oral daily  trimethoprim  40 mG/sulfamethoxazole 200 mG Suspension 320 milliGRAM(s) Oral every 8 hours    MEDICATIONS  (PRN):  acetaminophen   Oral Liquid .. 650 milliGRAM(s) Oral every 6 hours PRN Temp greater or equal to 38C (100.4F), Mild Pain (1 - 3)        Vital Signs Last 24 Hrs  T(C): 36.8 (18 Nov 2024 09:02), Max: 38.5 (17 Nov 2024 17:00)  T(F): 98.2 (18 Nov 2024 09:02), Max: 101.3 (17 Nov 2024 17:00)  HR: 92 (18 Nov 2024 12:00) (77 - 148)  BP: 142/96 (18 Nov 2024 12:00) (120/64 - 160/100)  BP(mean): 113 (18 Nov 2024 12:00) (83 - 123)  RR: 18 (18 Nov 2024 12:00) (15 - 20)  SpO2: 100% (18 Nov 2024 12:00) (96% - 100%)    Parameters below as of 18 Nov 2024 12:00  Patient On (Oxygen Delivery Method): ventilator    O2 Concentration (%): 40    PHYSICAL EXAM:  Constitutional:  chronically ill appearing  Eyes:  no icterus   Ear/Nose/Throat: no oral lesion, no sinus tenderness on percussion	  Neck:  + trach  Respiratory: CTA leilani  Cardiovascular: S1S2 RRR, no murmurs  Gastrointestinal:soft, (+) BS, no HSM  Extremities:no e/e/c  Vascular: DP Pulse:	right normal; left normal      LABS:                        9.9    9.07  )-----------( 179      ( 18 Nov 2024 05:30 )             31.8     11-18    144  |  115[H]  |  50[H]  ----------------------------<  109[H]  4.5   |  20[L]  |  1.80[H]    Ca    9.0      18 Nov 2024 05:30  Phos  3.7     11-18  Mg     2.3     11-18        Urinalysis Basic - ( 18 Nov 2024 05:30 )    Color: x / Appearance: x / SG: x / pH: x  Gluc: 109 mg/dL / Ketone: x  / Bili: x / Urobili: x   Blood: x / Protein: x / Nitrite: x   Leuk Esterase: x / RBC: x / WBC x   Sq Epi: x / Non Sq Epi: x / Bacteria: x        MICROBIOLOGY:    Culture - Sputum . (11.16.24 @ 01:53)    Gram Stain:   Moderate polymorphonuclear leukocytes seen per low power field  No squamous epithelial cells seen per low power field  Numerous Gram Negative Rods seen per oil power field   Specimen Source: Trach Asp Tracheal Aspirate   Culture Results:   Mixed gram negative rods including  Moderate Stenotrophomonas maltophilia  Commensal iram consistent with body site        RADIOLOGY & ADDITIONAL STUDIES:    < from: Xray Chest 1 View- PORTABLE-Urgent (Xray Chest 1 View- PORTABLE-Urgent .) (11.17.24 @ 10:57) >      INTERPRETATION:  Clinical History: Pneumonia    Frontal examination of the chest demonstrates the heart to be within   normal limits in transverse diameter. No acute infiltrates. Discoid   changes lung bases. Limited inspiration. Status post tracheostomy.   Degenerative changes thoracic spine.    IMPRESSION: Discoid changes lung bases    < end of copied text >

## 2024-11-18 NOTE — PROGRESS NOTE ADULT - CRITICAL CARE SERVICES
Last visit 6/19. Next visit 10/19.    labetalol (NORMODYNE) 200 MG tablet Take 1 tablet in the morning and 2 tablets at night. 270 tablet     Diagnosis/Plan:      1. H/o ANCA GN in remission. No proteinuria or hematuria at this time. Had rituxan infusions in the past  2. Chronic kidney disease stage 4 from hypertension and chronic interstitial nephritis.  Baseline creatinine 1.6 mg/dl. Recent creatinine was 1.7 mg/dl  3. Hypertension, bp well controlled    RF done per protocol.    60

## 2024-11-18 NOTE — PROGRESS NOTE ADULT - ASSESSMENT
IMPRESSION:  44 year old male with unclear PMH (?stroke, MI) who was found down at work, intubated for airway protection and found to have acute parenchymal hemorrhage within nabil with mass effect (+ acute/subacute right cerebellar infarct) in setting of hypertensive emergency, transferred to [facility] for further neurosurgical care. Hospital course c/b poor neurologic recovery s/p trach-PEG, AUR s/p gabriel, ANIKA on CKD c/b hyperkalemia, HAP s/p amp-sulbactam (EOT 10/23).  Hospital course complicated by many infections.      He recently had an aspiration event and was transferred back to ICU.  Tracheal aspirate is showing stenotrophomonas, which the patient is colonized with.  I doubt there is a pneumonia given clear CXR, minimal secretions, normal WBC, and improving FIO2 requirements despite no stenotrophomonas treatment    Plan:  1.  Recommend stopping bactrim given he already has CKD (bactrim can make this worse) and history of hyperkalemia   2.  Monitor clinically off antibiotics; I suspect he had an aspiration event which lead to recent decompensation.  Antibiotics do not prevent aspiration.      ID team 2 will sign off.  Reconsult as needed

## 2024-11-19 LAB
ANION GAP SERPL CALC-SCNC: 10 MMOL/L — SIGNIFICANT CHANGE UP (ref 5–17)
ANION GAP SERPL CALC-SCNC: 11 MMOL/L — SIGNIFICANT CHANGE UP (ref 5–17)
BUN SERPL-MCNC: 46 MG/DL — HIGH (ref 7–23)
BUN SERPL-MCNC: 50 MG/DL — HIGH (ref 7–23)
CALCIUM SERPL-MCNC: 9 MG/DL — SIGNIFICANT CHANGE UP (ref 8.4–10.5)
CALCIUM SERPL-MCNC: 9.4 MG/DL — SIGNIFICANT CHANGE UP (ref 8.4–10.5)
CHLORIDE SERPL-SCNC: 109 MMOL/L — HIGH (ref 96–108)
CHLORIDE SERPL-SCNC: 112 MMOL/L — HIGH (ref 96–108)
CO2 SERPL-SCNC: 18 MMOL/L — LOW (ref 22–31)
CO2 SERPL-SCNC: 19 MMOL/L — LOW (ref 22–31)
CREAT SERPL-MCNC: 1.78 MG/DL — HIGH (ref 0.5–1.3)
CREAT SERPL-MCNC: 1.81 MG/DL — HIGH (ref 0.5–1.3)
EGFR: 46 ML/MIN/1.73M2 — LOW
EGFR: 46 ML/MIN/1.73M2 — LOW
EGFR: 47 ML/MIN/1.73M2 — LOW
EGFR: 47 ML/MIN/1.73M2 — LOW
GLUCOSE SERPL-MCNC: 118 MG/DL — HIGH (ref 70–99)
GLUCOSE SERPL-MCNC: 146 MG/DL — HIGH (ref 70–99)
HCT VFR BLD CALC: 30.8 % — LOW (ref 39–50)
HGB BLD-MCNC: 9.9 G/DL — LOW (ref 13–17)
MAGNESIUM SERPL-MCNC: 2 MG/DL — SIGNIFICANT CHANGE UP (ref 1.6–2.6)
MAGNESIUM SERPL-MCNC: 2.1 MG/DL — SIGNIFICANT CHANGE UP (ref 1.6–2.6)
MCHC RBC-ENTMCNC: 29.6 PG — SIGNIFICANT CHANGE UP (ref 27–34)
MCHC RBC-ENTMCNC: 32.1 G/DL — SIGNIFICANT CHANGE UP (ref 32–36)
MCV RBC AUTO: 92.2 FL — SIGNIFICANT CHANGE UP (ref 80–100)
NRBC # BLD: 0 /100 WBCS — SIGNIFICANT CHANGE UP (ref 0–0)
NRBC BLD-RTO: 0 /100 WBCS — SIGNIFICANT CHANGE UP (ref 0–0)
PHOSPHATE SERPL-MCNC: 2.2 MG/DL — LOW (ref 2.5–4.5)
PHOSPHATE SERPL-MCNC: 3.2 MG/DL — SIGNIFICANT CHANGE UP (ref 2.5–4.5)
PLATELET # BLD AUTO: 188 K/UL — SIGNIFICANT CHANGE UP (ref 150–400)
POTASSIUM SERPL-MCNC: 4 MMOL/L — SIGNIFICANT CHANGE UP (ref 3.5–5.3)
POTASSIUM SERPL-MCNC: 4.4 MMOL/L — SIGNIFICANT CHANGE UP (ref 3.5–5.3)
POTASSIUM SERPL-SCNC: 4 MMOL/L — SIGNIFICANT CHANGE UP (ref 3.5–5.3)
POTASSIUM SERPL-SCNC: 4.4 MMOL/L — SIGNIFICANT CHANGE UP (ref 3.5–5.3)
RBC # BLD: 3.34 M/UL — LOW (ref 4.2–5.8)
RBC # FLD: 14.9 % — HIGH (ref 10.3–14.5)
SODIUM SERPL-SCNC: 138 MMOL/L — SIGNIFICANT CHANGE UP (ref 135–145)
SODIUM SERPL-SCNC: 141 MMOL/L — SIGNIFICANT CHANGE UP (ref 135–145)
WBC # BLD: 9.71 K/UL — SIGNIFICANT CHANGE UP (ref 3.8–10.5)
WBC # FLD AUTO: 9.71 K/UL — SIGNIFICANT CHANGE UP (ref 3.8–10.5)

## 2024-11-19 PROCEDURE — 99291 CRITICAL CARE FIRST HOUR: CPT

## 2024-11-19 PROCEDURE — 71045 X-RAY EXAM CHEST 1 VIEW: CPT | Mod: 26

## 2024-11-19 RX ORDER — SODIUM PHOSPHATE,DIBASIC DIHYD
30 POWDER (GRAM) MISCELLANEOUS ONCE
Refills: 0 | Status: DISCONTINUED | OUTPATIENT
Start: 2024-11-19 | End: 2024-11-19

## 2024-11-19 RX ORDER — IPRATROPIUM BROMIDE AND ALBUTEROL SULFATE .5; 2.5 MG/3ML; MG/3ML
3 SOLUTION RESPIRATORY (INHALATION) EVERY 8 HOURS
Refills: 0 | Status: DISCONTINUED | OUTPATIENT
Start: 2024-11-19 | End: 2024-12-11

## 2024-11-19 RX ORDER — METOPROLOL SUCCINATE 50 MG/1
5 TABLET, EXTENDED RELEASE ORAL ONCE
Refills: 0 | Status: COMPLETED | OUTPATIENT
Start: 2024-11-19 | End: 2024-11-19

## 2024-11-19 RX ORDER — ACETYLCYSTEINE 200 MG/ML
4 INHALANT RESPIRATORY (INHALATION) EVERY 8 HOURS
Refills: 0 | Status: DISCONTINUED | OUTPATIENT
Start: 2024-11-19 | End: 2024-12-11

## 2024-11-19 RX ADMIN — Medication 650 MILLIGRAM(S): at 04:01

## 2024-11-19 RX ADMIN — Medication 1 DROP(S): at 05:48

## 2024-11-19 RX ADMIN — Medication 1 APPLICATION(S): at 05:52

## 2024-11-19 RX ADMIN — AMLODIPINE BESYLATE 10 MILLIGRAM(S): 10 TABLET ORAL at 05:48

## 2024-11-19 RX ADMIN — Medication 40 MILLIGRAM(S): at 11:52

## 2024-11-19 RX ADMIN — IPRATROPIUM BROMIDE AND ALBUTEROL SULFATE 3 MILLILITER(S): .5; 2.5 SOLUTION RESPIRATORY (INHALATION) at 03:28

## 2024-11-19 RX ADMIN — Medication 1 DROP(S): at 01:01

## 2024-11-19 RX ADMIN — Medication 1 APPLICATION(S): at 17:19

## 2024-11-19 RX ADMIN — HEPARIN SODIUM 5000 UNIT(S): 1000 INJECTION INTRAVENOUS; SUBCUTANEOUS at 21:29

## 2024-11-19 RX ADMIN — ACETYLCYSTEINE 4 MILLILITER(S): 200 INHALANT RESPIRATORY (INHALATION) at 03:28

## 2024-11-19 RX ADMIN — ACETYLCYSTEINE 4 MILLILITER(S): 200 INHALANT RESPIRATORY (INHALATION) at 22:59

## 2024-11-19 RX ADMIN — Medication 650 MILLIGRAM(S): at 21:29

## 2024-11-19 RX ADMIN — ACETYLCYSTEINE 4 MILLILITER(S): 200 INHALANT RESPIRATORY (INHALATION) at 13:06

## 2024-11-19 RX ADMIN — DOXAZOSIN MESYLATE 8 MILLIGRAM(S): 8 TABLET ORAL at 21:29

## 2024-11-19 RX ADMIN — Medication 1 DROP(S): at 21:29

## 2024-11-19 RX ADMIN — POLYETHYLENE GLYCOL 3350 17 GRAM(S): 17 POWDER, FOR SOLUTION ORAL at 17:04

## 2024-11-19 RX ADMIN — Medication 667 MILLIGRAM(S): at 09:10

## 2024-11-19 RX ADMIN — IPRATROPIUM BROMIDE AND ALBUTEROL SULFATE 3 MILLILITER(S): .5; 2.5 SOLUTION RESPIRATORY (INHALATION) at 09:14

## 2024-11-19 RX ADMIN — Medication 650 MILLIGRAM(S): at 03:48

## 2024-11-19 RX ADMIN — Medication 500 MILLILITER(S): at 03:58

## 2024-11-19 RX ADMIN — HEPARIN SODIUM 5000 UNIT(S): 1000 INJECTION INTRAVENOUS; SUBCUTANEOUS at 05:48

## 2024-11-19 RX ADMIN — IPRATROPIUM BROMIDE AND ALBUTEROL SULFATE 3 MILLILITER(S): .5; 2.5 SOLUTION RESPIRATORY (INHALATION) at 13:05

## 2024-11-19 RX ADMIN — METOPROLOL SUCCINATE 5 MILLIGRAM(S): 50 TABLET, EXTENDED RELEASE ORAL at 06:30

## 2024-11-19 RX ADMIN — Medication 1 DROP(S): at 17:04

## 2024-11-19 RX ADMIN — Medication 1 DROP(S): at 14:00

## 2024-11-19 RX ADMIN — Medication 15 MILLILITER(S): at 17:04

## 2024-11-19 RX ADMIN — ACETYLCYSTEINE 4 MILLILITER(S): 200 INHALANT RESPIRATORY (INHALATION) at 09:14

## 2024-11-19 RX ADMIN — HEPARIN SODIUM 5000 UNIT(S): 1000 INJECTION INTRAVENOUS; SUBCUTANEOUS at 14:00

## 2024-11-19 RX ADMIN — Medication 1 DROP(S): at 09:10

## 2024-11-19 RX ADMIN — IPRATROPIUM BROMIDE AND ALBUTEROL SULFATE 3 MILLILITER(S): .5; 2.5 SOLUTION RESPIRATORY (INHALATION) at 22:59

## 2024-11-19 RX ADMIN — Medication 15 MILLILITER(S): at 05:53

## 2024-11-19 RX ADMIN — SODIUM ZIRCONIUM CYCLOSILICATE 5 GRAM(S): 5 POWDER, FOR SUSPENSION ORAL at 11:52

## 2024-11-19 RX ADMIN — POLYETHYLENE GLYCOL 3350 17 GRAM(S): 17 POWDER, FOR SOLUTION ORAL at 05:46

## 2024-11-19 RX ADMIN — Medication 650 MILLIGRAM(S): at 22:41

## 2024-11-19 RX ADMIN — Medication 320 MILLIGRAM(S): at 05:48

## 2024-11-19 NOTE — OCCUPATIONAL THERAPY INITIAL EVALUATION ADULT - NS ASR FOLLOW COMMAND OT EVAL
Pt inconsistently responding to yes/no questions via eye blinking v hand squeezing, appreciate speech recs for communication needs./100% of the time/able to follow single-step instructions 80% single step commands. Pt inconsistently responding to yes/no questions via eye blinking v hand squeezing, appreciate speech recs for communication needs.

## 2024-11-19 NOTE — OCCUPATIONAL THERAPY INITIAL EVALUATION ADULT - DIAGNOSIS, OT EVAL
Pt s/p pontine hemorrhage 2/2 HTN. Pt presents with impaired cognition, muscle tone, vision, sensation, decreased strength, ROM, coordination, balance, impacting ability to perform ADL and mobility.

## 2024-11-19 NOTE — PROGRESS NOTE ADULT - SUBJECTIVE AND OBJECTIVE BOX
INTERVAL HISTORY: HPI:  Unknown age male, unknown past medical history (reported stroke and MI by coworkers) presented to [facility] with AMS, Pt was working at [employer] when he was found down by coworkers. EMS called and pt brought to [facility] ED. Intubated, sedated, started on cardene for SBPs in 200s. CT head showed brain stem bleed. Transferred to NSICU for further management.  (30 Sep 2024 12:55)    Drug Dosing Weight  Height (cm): 172.7 (30 Sep 2024 08:39)  Weight (kg): 80 (30 Sep 2024 09:04)  BMI (kg/m2): 26.8 (30 Sep 2024 09:04)  BSA (m2): 1.94 (30 Sep 2024 09:04)    PAST MEDICAL & SURGICAL HISTORY:  Acute myocardial infarction  CVA (cerebral vascular accident)    REVIEW OF SYSTEMS: [ ] Unable to Assess due to neurologic exam   [ ] All ROS addressed below are non-contributory, except:  Neuro: [ ] Headache [ ] Back pain [ ] Numbness [ ] Weakness [ ] Ataxia [ ] Dizziness [ ] Aphasia [ ] Dysarthria [ ] Visual disturbance  Resp: [ ] Shortness of breath/dyspnea, [ ] Orthopnea [ ] Cough  CV: [ ] Chest pain [ ] Palpitation [ ] Lightheadedness [ ] Syncope  Renal: [ ] Thirst [ ] Edema  GI: [ ] Nausea [ ] Emesis [ ] Abdominal pain [ ] Constipation [ ] Diarrhea  Hem: [ ] Hematemesis [ ] bright red blood per rectum  ID: [ ] Fever [ ] Chills [ ] Dysuria  ENT: [ ] Rhinorrhea    PHYSICAL EXAM:  General: No Acute Distress   Neurological: eyes open spontaneously, tracks vertically, RUE wiggles fingers to commands,, b/l LE WD, LUE extension, RUE WD, pupils b/l 3mm reactive, cough gag (+)  Pulmonary: Clear to Auscultation, No Rales, No Rhonchi, No Wheezes   Cardiovascular: S1, S2, Regular Rate and Rhythm   Gastrointestinal: Soft, Nontender, Nondistended Extremities: No calf tenderness             ICU Vital Signs Last 24 Hrs  T(C): 36.9 (19 Nov 2024 08:57), Max: 38.3 (18 Nov 2024 15:00)  T(F): 98.5 (19 Nov 2024 08:57), Max: 101 (18 Nov 2024 15:00)  HR: 107 (19 Nov 2024 09:00) (65 - 124)  BP: 137/93 (19 Nov 2024 09:00) (122/74 - 168/99)  BP(mean): 109 (19 Nov 2024 09:00) (91 - 126)  ABP: --  ABP(mean): --  RR: 18 (19 Nov 2024 09:00) (16 - 18)  SpO2: 97% (19 Nov 2024 09:00) (93% - 100%)      11-18-24 @ 07:01  -  11-19-24 @ 07:00  --------------------------------------------------------  IN: 2130 mL / OUT: 2160 mL / NET: -30 mL    11-19-24 @ 07:01  -  11-19-24 @ 10:29  --------------------------------------------------------  IN: 250 mL / OUT: 110 mL / NET: 140 mL        Mode: AC/ CMV (Assist Control/ Continuous Mandatory Ventilation), RR (machine): 12, TV (machine): 430, FiO2: 40, PEEP: 5, ITime: 1, MAP: 6.4, PIP: 10    acetaminophen   Oral Liquid .. 650 milliGRAM(s) Oral every 6 hours PRN  acetylcysteine 10%  Inhalation 4 milliLiter(s) Inhalation every 6 hours  albuterol/ipratropium for Nebulization 3 milliLiter(s) Nebulizer every 6 hours  amLODIPine   Tablet 10 milliGRAM(s) Oral daily  artificial tears (preservative free) Ophthalmic Solution 1 Drop(s) Right EYE every 4 hours  calcium acetate 667 milliGRAM(s) Oral three times a day with meals  chlorhexidine 0.12% Liquid 15 milliLiter(s) Oral Mucosa every 12 hours  doxazosin 8 milliGRAM(s) Oral at bedtime  heparin   Injectable 5000 Unit(s) SubCutaneous every 8 hours  hydrochlorothiazide 12.5 milliGRAM(s) Oral daily  insulin lispro (ADMELOG) corrective regimen sliding scale   SubCutaneous every 6 hours  pantoprazole   Suspension 40 milliGRAM(s) Oral daily  petrolatum Ophthalmic Ointment 1 Application(s) Both EYES two times a day  polyethylene glycol 3350 17 Gram(s) Oral two times a day  sodium zirconium cyclosilicate 5 Gram(s) Oral daily  trimethoprim  40 mG/sulfamethoxazole 200 mG Suspension 320 milliGRAM(s) Oral every 8 hours      LABS:  Na: 141 (11-19 @ 05:30), 144 (11-18 @ 05:30), 143 (11-17 @ 05:30)  K: 4.0 (11-19 @ 05:30), 4.5 (11-18 @ 05:30), 4.9 (11-17 @ 05:30)  Cl: 112 (11-19 @ 05:30), 115 (11-18 @ 05:30), 116 (11-17 @ 05:30)  CO2: 19 (11-19 @ 05:30), 20 (11-18 @ 05:30), 18 (11-17 @ 05:30)  BUN: 50 (11-19 @ 05:30), 50 (11-18 @ 05:30), 56 (11-17 @ 05:30)  Cr: 1.81 (11-19 @ 05:30), 1.80 (11-18 @ 05:30), 2.13 (11-17 @ 05:30)  Glu: 146(11-19 @ 05:30), 109(11-18 @ 05:30), 97(11-17 @ 05:30)    Hgb: 9.9 (11-19 @ 05:30), 9.9 (11-18 @ 05:30), 9.9 (11-17 @ 05:30)  Hct: 30.8 (11-19 @ 05:30), 31.8 (11-18 @ 05:30), 31.6 (11-17 @ 05:30)  WBC: 9.71 (11-19 @ 05:30), 9.07 (11-18 @ 05:30), 8.84 (11-17 @ 05:30)  Plt: 188 (11-19 @ 05:30), 179 (11-18 @ 05:30), 178 (11-17 @ 05:30)      ABG - ( 18 Nov 2024 11:50 )  pH, Arterial: 7.38  pH, Blood: x     /  pCO2: 35    /  pO2: 148   / HCO3: 21    / Base Excess: -3.8  /  SaO2: 99.9

## 2024-11-19 NOTE — OCCUPATIONAL THERAPY INITIAL EVALUATION ADULT - MUSCLE TONE ASSESSMENT, REHAB EVAL
no tone noted in BUE, BLE with extensor tone in semi-supine with hyper-reflexia however no extensor tone in seated position no tone noted in RUE, Pt with mild tone in L biceps, BLE with extensor tone in semi-supine with hyper-reflexia however no extensor tone in seated position

## 2024-11-19 NOTE — SPEECH LANGUAGE PATHOLOGY EVALUATION - SLP GENERAL OBSERVATIONS
Pt was seen fully awake and alert, HOB partially elevated, on O2 via trach collar (PORTEX 7.5, cuff fully deflated). Pt received 10L and 40%FiO2.

## 2024-11-19 NOTE — OCCUPATIONAL THERAPY INITIAL EVALUATION ADULT - LEVEL OF INDEPENDENCE, REHAB EVAL
1600 Addendum:  Received call back from patient.  Reviewed instructions as outlined in the note below.  Patient verbalized understanding of these instructions and will call with questions.  Device clinic phone number provided.     TUNA Quintero     ---------------------------------------------------------------------------------------------------------------    1017: Called and left message for patient requesting a call back to review the following information.  Device clinic phone number provided.  TUAN Quintero     ----------------------------------------------------------------------------------------------------------------    Post Medtronic PPM Generator Change 6/7/24 with Dr. Bearden discharge phone call.    To review the following:  - Remove outer dressing 3 days after implant. May shower after outer dressing removed.   - Leave dermabond in place.  This will come off naturally over the next several weeks.   - Watch for redness, drainage, warmth, or fever. Call device clinic if any signs of infection.   - No soaking in bath tub, swimming pool or hot tub until your incision completely heals; approximately 6 weeks.     Pt lives in [place] and historically follows in [place]  - 1 week device check scheduled: Previously scheduled in [place] for 6/13/24 at 0815.    Device clinic phone number provided ([phone]).     TUAN Quintero    dependent (less than 25% patients effort)

## 2024-11-19 NOTE — PROGRESS NOTE ADULT - ASSESSMENT
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; respiratory failure s/p trach/peg readmit for hypoxia 2/2 presumed mucous plugging      NEURO:  NIHSS >25; poor functional recovery anticipated; family aware   -neuro check q4  -PT/OT to follow   -communication to be addressed by speech  -PMNR consult     PULMONARY:  CPAP trials passed, will attempt trach collar   Mucomyst & Duoneb to q8    CARDIOVASCULAR:  monitor on telemetry   vitals q1  sbp goal 100-160 ; diastolic pressure >80; will start HCTZ 12.5mg daily; c/w amlodipine 10mg     GASTROENTEROLOGY:  PEG in place; c/w tube feeds (nephro)  ensure BMs w/ Miralax & senna-lokelma for hyperkalemia 2/2 renal disease     RENAL/:  ANIKA on CKD   -c/w calcium acetate   -free water 250mg q6hrs   -check BMP qd  -strict i/o's ;  -Na goal 135-145  -d/c gabriel; initiate TOV     ENDOCRINE:  Diabetes Mellitus  A1c- 5.3  Monitor FSG q6 hrs while w/ tube feeds   Hypothyroidism   TSH-1.23      HEME/ONC:  DVT ppx: heparin SQ  b/l SCDs    INFECTIOUS: d/c bactrim monitor off abx id sign off sputum culture Stenotrophomonas likely colonization       DISPO: recommended for IRON- (only Emergency Medicaid) pending RUCOL ; social work & CM following

## 2024-11-19 NOTE — SPEAKING VALVE EVALUATION - SLP PERTINENT HISTORY OF CURRENT PROBLEM
PMH of possible stroke/MI present to [facility] after collapsing at work. Decorticate posturing, vomiting, intubated for airway protection. 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 Transferred to [facility] for further management. S/p trach 10/14. S/p peg 10/21. Re-upgrade to ICU 2/2 desaturation event and suctioning requirements 11/15.

## 2024-11-19 NOTE — PHYSICAL THERAPY INITIAL EVALUATION ADULT - FOLLOWS COMMANDS/ANSWERS QUESTIONS, REHAB EVAL
Inconsistent responses to yes/no questions via L eye blink and R hand squeezing/75% of the time Pt follows ~80% of simple one step commands; Inconsistent responses to yes/no questions via L eye blink and R hand squeezing

## 2024-11-19 NOTE — PROGRESS NOTE ADULT - ASSESSMENT
I have personally reviewed the above clinical note and all of its components and:  [ ] agree with the above assessment and plan without modifications.  [x ] agree with the above with modifications made to the assessment and/or plan of care.  [ ] disagree with the above with significant modifications as listed below:

## 2024-11-19 NOTE — PHYSICAL THERAPY INITIAL EVALUATION ADULT - MODALITIES TREATMENT COMMENTS
CN Assessment: Pt demonstrates intact tracking vertically (superior and inferior) no horizontal eye movements observed; Pt demonstrates spontaneous vertical jerking eye movements at rest; Pt able to close L eye on command when sitting EOB however unable to close R eye; Pt demonstrates trace movement when asked to smile; spontaneous cervical rotation observed bilaterally.

## 2024-11-19 NOTE — PROGRESS NOTE ADULT - SUBJECTIVE AND OBJECTIVE BOX
S/Overnight events: BD 50. GEORGE overnight.     Hospital Course: 9/30: transferred from [facility]. A line placed. Versed dc'd. Cy Rader at bedside, states pt has family in [place], cannot confirm medications or PMH other than stroke and MI. 250cc bolus 3% given. LR switched to NS. hydralazine 25q8 started, 3% started, switched propofol to precedex   10/1: stability CTH done. Added labetalol, started TF. Palliative consulted. ethics consulted to determine surrogate. febrile 103, pan cx sent  10/2: BD 2, GEORGE overnight. TF resumed. Desatt'd to 80s, FiO2 inc. to 50. Fentanyl given, ABG, CXR ordered. Maxxed on precedex, started on propofol for DARIEN -4 - -5. Precedex dc'd. Duonebs, mucomyst, hypertonic added. 3% dc'd. Cardene dc'd. Start vanc/CTX. Increased labetalol 200q8. MRSA negative, dc'd vanc. ETT pulled back 2cm x 2, good positioning after confirmatory chest xray. Ethics attempting to establish HCP with family. Na 159, starting FW 250q6 for range 150-155.   10/3: BD3, GEORGE o/n, neuro stable. Na elevating, FW increased to 300q6. Dc'd bowel reg for diarrhea. vEEG started. SQH 5000q8 tonight.   10/4: BD 4, albumn bolus, incr. LR to 80 2/2 incr. in Cr, LR to 100cc/hr for uptrending Cr. Started 7% hypersal for 48hrs and SL atropine for inline/oral thick secretions. Dc'd CTX and started ancef for MSSA in the sputum. Nephrology consulted for CKD, f/u recs. SBP 170s, given hydralazine 10mg IVP.   10/5: BD5, o/n 10mg IVP hydralazine given for SBP 170s and started on hydralazine 25q8 via OGT. 10mg IV push labetalol for SBP > 160s. RT placed for diarrhea.   10/6: BD6, o/n FW increased to 350q4 per nephrology recs. IV tylenol for temp 100.6, SBp 160s presumed uncomfortable.   10/7: BD7, overnight pancultured for temp 101.8F.   10/8: BD8. GEORGE. Cr bumped. decreased LR to 75cc/hr. Adding simethicone ATC. incr hydralazine 50mgTID. Incr labetalol 300mgTID. Na 145, decreased FWF to 250q6. Start precedex. FENa consistent with intrinsic kidney injury. Pend repeat renal US. Retaining up to 1.3L, bladder scans q6, straight cath PRN  10/9: BD 9. GEORGE overnight. Neuro stable. abd xray for distention w non-specific gas pattern, OGT to LIWS for morning. duonebs/mucomyst to q8 for improving secretions. Changed tube feeds to Jevity 1.5 20cc/hr, low rate due to abdominal distention, nepro dense and more difficult to digest. Tolerating CPAP, confirmed by ABG.   10/10: BD 10. GEORGE overnight. Neuro stable. (+) gabriel for urinary retention on bladder scan. inc TF to goal rate of 40cc/hr. family leaning toward pursuing trach/PEG. 1/2 amp for FS 81.   10/11: BD 11. GEORGE overnight. Neuro stable. Trach/PEG consults placed.   10/12: BD 12. GEORGE overnight. Neuro stable. MRI brain complete.   10/13: BD 13. Increase flomax. Hold SQH after PM dose for trach tm. IVL.   10/14: BD 14. GEORGE overnight, remains on AC/VC. Gabriel placed for urinary retention. Dc'd free water.  S/p trach with pulm. NGT placed and CXR confirmed in good position.   10/15: BD 15, GEORGE ovn. resumed feeds. spiked 101, pan cx sent.   10/16: BD 16. GEORGE ovn. Lokelma 5mg for K+ 5.4. Started vanc q 24/zosyn for empiric PNA coverage, IVF to 100/hr. PEG held for fever.   10/17: BD 17,  ordered serum osm and urine osm for am. Started sinemet for neurostimulation. Increased cardura to 0.8. Started FW 100q4, dc'd IVF. MRSA negative, dc'd vanc. NGT replaced d/t coiling.   10/18: BD 18, GEORGE overnight, neuro stable. Amantadine added for neurostim. zosyn changed to unasyn for acinetobacter baumannii, failed TOV and required SC  10/19: BD 19, GEORGE ovn. cardura 2mg added for retention. labetalol decreased 200q8, hydralazine decreased 25q8. Gabriel replaced.   10/20: BD20, GEORGE overnight. NGT dislodged, replaced. PEG tomorrow w/ gen surg, FW increased to 150q4 and labetalol decreased to 100q8, lokelma given for hyperkalemia.   10/21: BD 21. POD0 PEG placement with Gen surg. decr labetolol to 50q8, incr. cardura to 0.4, started lokelma and phoslo, dc gabriel POD0 PEG placement with Gen surg.  10/22: BD 22. Plan to start TF today via PEG. dc labetalol, Following ophtho recs. Increased apnea settings - found to be in cheyne-moe respiration. CPAP 5/5.  10/23: BD 23. hydralazine d/c'd, trach collar trial today. Rectal tube placed at 6am.  10/24: BD24, o/n lokelma held due to diarrhea. Free water 100q6 resumed. dc'd tamsulosin, amantadine. Incr'd cardura to 8mg qhs. Dc'd FW. Switched jevity to nepro. gabriel placed for high urine output. Started SL atropine for oral secretions. Dc'd free water.  10/25: BD25, o/n decreased suctioning requirements to > q4hrs, GEORGE. Cr improving, cont phoslo, lokelma held at this time. Gabriel placed yest, cont. Tolerating trach collar. Given 500cc plasmalyte bolus for ANIKA. Dc'd sinemet.   10/26: BD26, o/n resumed lokelma 5mg daily and resumed 100cc free water q6hrs. Change in neuro status with new right pupillary dilation with anisocoria (right pupil 6mm fixed and left pupil 3mm briskly reactive). Given 23.4% NaCl bullet, taken for emergent CTH showing mostly resolved pontine hemorrhage, continued brainstem hypodensity likely edema d/t hemorrhage, no new hemorrhage or infarct, no herniation, mild increase in size of left lateral ventricle. Vitals remaine stable. Na goal > 140.   10/27: BD27, o/n GEORGE.Neuro stable. Pend stepdown with airway bed.   10/28: BD 28. GEORGE overnight. Neuro stable. Miralax ordered. Gabriel removed, pending TOV.  10/29: BD 29. GEORGE o/n. Given 2L NS over 8 hrs for increased BUN/Cr ratio. Gabriel placed for frequent straight cath.   10/30: BD 30.   10/31: BD 31. GEORGE overnight. Na 149, increased free water to 200q6. 1L NS for uptrending BUN.   11/1: BD 32. GEORGE overnight. Given 1L NS for dehydration. Na 146, increased FW to 250q6.   11/2: BD 33 GEORGE overnight, neuro stable, given 500cc bolus for net negative status and tachycardia   11/3: BD 34, GEORGE overnight, neuro stable. Patient remains tachycardic, EKG showing sinus tachycardia, given additional 500cc NS bolus. Febrile to 101.9F, pan cultured (without UA), CXR WNL, given tylenol.   11/4: BD 35, GEORGE overnight, neuro stable. Given 1L NS for tachycardia. sputum (+) for stenotropohomas maltophilia.   11/5: BD 36 GEORGE overnight, neuro stable. Vancomycin dc'd. Chest PT BID. ID consulted, cont zosyn.  11/6: BD 37. blood cx + klebsiella dc zosyn changed to cefepime, CTAP ordered, rpt blood cx sent.    11/7: BD 38. Pending CT A/P, given 250cc bolus and starting maintenance fluids overnight. Pending CT A/P after bolus   11/8: BD 39. CT CAP negative for infection.   11/9: BD 40. GEORGE overnight.  11/10: BD 41. GEORGE overnight. desat to 85 on trach collar, O2 inc to 10L and 100%, O2 sat inc to 95. pt tachy to 110s, euvolemic. given tylenol. ABG and CXR ordered. spiked fever, pancultured, RVP negative. AM ABG w pO2 79, rpt w pO2 79. pt appears comfortable, satting 94%.   11/11: BD 42. GEORGE overnight. pt became tachy to 130s, desat to 90 on 100% FiO2 and 10L. suctioned, (+) productive cough. temp 101.4, given 1g IV tylenol and 500cc NS bolus for euvolemia. fever and HR downtrending. LE dopplers negative for dvt  11/12: BD 43, GEORGE ovn, fever and HR downtrending, satting 97% 70% FIO2  11/13: BD 44, GEORGE ovn. started standing tylenol x24 hours for tachycardia. desat to 80s, o2 increased. CXR stable, pending CTA PE protocol.   11/14: BD 45, GEORGE overnight, neuro stable. resp therapy dec FiO2 to 70%.   11/15: BD 46, GEORGE overnight, neuro stable.  Rapid called for desaturation 30s, tachycardic 140s. Patient bagged, 100% fio2, heavily suctioned. CXR/POCUS unremarkable. ABG c/w desaturation. WBC 14.71. Afebrile. O2 improved to 90s and patient upgraded to ICU. ABG paO2 30s improved to 89 on vent. IV Tylenol x 1, sputum sent. Start protonix while on vent.   11/16: BD 47. POCUS showed collapsable IVCF, given 1L bolus. Vanco/Cefpime added empriic for PNA, NGT feeds restarted. MRSA swab neg, Vanc DC'd.   11/17: BD 48. GEORGE overnight. 1l bolus for tachycardia. Spiked to 101, cultured. 500cc bolus for tachycardia, tachy to 148 given 25mcg fentanyl, 250cc albumin, 1.5L bolus. 5 IV lopressor with response HR to 100s. +Stenotrophomonas on sputum cx.   11/18: BD 49. GEORGE overnight. Consulted ID, cefepime switched to bactrim x7days. Started hydrochlorothiazine 12.5mg daily.  11/19: BD 50. Tachy 120s, given tylenol and 500cc NS. Tolerating 5/5, will TCx. Holding phos binder. D/c Bactrim. D/c gabriel, f/u TOV.     Vital Signs Last 24 Hrs  T(C): 36.9 (19 Nov 2024 08:57), Max: 38.3 (18 Nov 2024 15:00)  T(F): 98.5 (19 Nov 2024 08:57), Max: 101 (18 Nov 2024 15:00)  HR: 83 (19 Nov 2024 12:00) (65 - 124)  BP: 133/95 (19 Nov 2024 12:00) (122/74 - 168/99)  BP(mean): 110 (19 Nov 2024 12:00) (91 - 126)  RR: 18 (19 Nov 2024 12:00) (16 - 18)  SpO2: 98% (19 Nov 2024 12:00) (93% - 100%)    Parameters below as of 19 Nov 2024 12:00  Patient On (Oxygen Delivery Method): tracheostomy collar    O2 Concentration (%): 40    I&O's Detail    18 Nov 2024 07:01  -  19 Nov 2024 07:00  --------------------------------------------------------  IN:    Free Water: 550 mL    Nepro with Carb Steady: 1080 mL    Sodium Chloride 0.9% Bolus: 500 mL  Total IN: 2130 mL    OUT:    Indwelling Catheter - Urethral (mL): 2160 mL  Total OUT: 2160 mL    Total NET: -30 mL    19 Nov 2024 07:01  -  19 Nov 2024 12:38  --------------------------------------------------------  IN:    Free Water: 250 mL    Nepro with Carb Steady: 120 mL  Total IN: 370 mL    OUT:    Indwelling Catheter - Urethral (mL): 110 mL  Total OUT: 110 mL    Total NET: 260 mL    I&O's Summary    18 Nov 2024 07:01  -  19 Nov 2024 07:00  --------------------------------------------------------  IN: 2130 mL / OUT: 2160 mL / NET: -30 mL    19 Nov 2024 07:01  -  19 Nov 2024 12:38  --------------------------------------------------------  IN: 370 mL / OUT: 110 mL / NET: 260 mL    PHYSICAL EXAM:  Gen: No acute distress, resting comfortably in bed  Skin: Warm and dry. No rashes or lesions.  EENT: PERRL 3+, vertical EOM, tracts vertically, spontaneously opens eyes, no conjunctival redness, sclera is clear, hearing intact to whisper test bilaterally, nares patent bilaterally.  Lungs: +trach to vent, breathing is non-labored, symmetrical chest wall movement, lung sounds are clear bilaterally without rales, rhonchi, or wheezing.   Heart: Regular rate, S1, and S2 without murmur or gallop  Abd: +PEG. abdomen is soft, non-distended, and without ecchymosis. Normal bowel sounds on auscultation. No masses, hepatomegaly, or splenomegaly appreciated.  Peripheral vascular: Capillary refill <2 seconds; bilateral radial, posterior tibialis, and dorsalis pedis pulses 2/4.  Neuro: A&Ox0, intermittently follows commands, EOM intact vertically, opens eyes spontaneously, smiles on command, RUE triceps 4/5, bicep 0/5, hand  3/5; LUE not responsive to noxious stimuli; RLE wiggles toes on command; LLE withdraws from noxious stimuli *** REFLEXES *** Biceps, triceps, brachioradialis, patellar, Achilles reflexes 2+/5 bilaterally.  Wounds/Drains: None     TUBES/LINES:  [] CVC  [] A-line  [] Lumbar Drain  [] Ventriculostomy  [] Gabriel  [] Other    DIET:  [] NPO  [] Mechanical  [X] Tube feeds    LABS:                        9.9    9.71  )-----------( 188      ( 19 Nov 2024 05:30 )             30.8     11-19    141  |  112[H]  |  50[H]  ----------------------------<  146[H]  4.0   |  19[L]  |  1.81[H]    Ca    9.0      19 Nov 2024 05:30  Phos  2.2     11-19  Mg     2.0     11-19    Urinalysis Basic - ( 19 Nov 2024 05:30 )    Color: x / Appearance: x / SG: x / pH: x  Gluc: 146 mg/dL / Ketone: x  / Bili: x / Urobili: x   Blood: x / Protein: x / Nitrite: x   Leuk Esterase: x / RBC: x / WBC x   Sq Epi: x / Non Sq Epi: x / Bacteria: x    CAPILLARY BLOOD GLUCOSE    POCT Blood Glucose.: 108 mg/dL (19 Nov 2024 11:44)  POCT Blood Glucose.: 142 mg/dL (19 Nov 2024 05:39)  POCT Blood Glucose.: 119 mg/dL (18 Nov 2024 23:03)  POCT Blood Glucose.: 119 mg/dL (18 Nov 2024 16:01)    Drug Levels: N/A    CSF Analysis: N/A    Allergies Unknown    Allergy Status Unknown    Intolerances Unknown    MEDICATIONS:  Antibiotics: D/c'd Bactrim morning of 11/19    Neuro:  acetaminophen   Oral Liquid .. 650 milliGRAM(s) Oral every 6 hours PRN    Anticoagulation:  heparin   Injectable 5000 Unit(s) SubCutaneous every 8 hours    OTHER:  acetylcysteine 10%  Inhalation 4 milliLiter(s) Inhalation every 8 hours  albuterol/ipratropium for Nebulization 3 milliLiter(s) Nebulizer every 8 hours  amLODIPine   Tablet 10 milliGRAM(s) Oral daily  artificial tears (preservative free) Ophthalmic Solution 1 Drop(s) Right EYE every 4 hours  chlorhexidine 0.12% Liquid 15 milliLiter(s) Oral Mucosa every 12 hours  doxazosin 8 milliGRAM(s) Oral at bedtime  hydrochlorothiazide 12.5 milliGRAM(s) Oral daily  insulin lispro (ADMELOG) corrective regimen sliding scale   SubCutaneous every 6 hours  pantoprazole   Suspension 40 milliGRAM(s) Oral daily  petrolatum Ophthalmic Ointment 1 Application(s) Both EYES two times a day  polyethylene glycol 3350 17 Gram(s) Oral two times a day  sodium zirconium cyclosilicate 5 Gram(s) Oral daily    IVF: None.    CULTURES:  Culture Results:   No growth at 48 Hours (11-17 @ 05:59)  Culture Results:   Mixed gram negative rods including  Moderate Stenotrophomonas maltophilia  Commensal iram consistent with body site (11-16 @ 01:53)    RADIOLOGY & ADDITIONAL TESTS:  Xray Chest 1 View- PORTABLE-Routine (Xray Chest 1 View- PORTABLE-Routine in AM.) (11.19.24 @ 07:00) >  IMPRESSION:    Similar appearance to prior exam 11/17/2024. No lung consolidation or   acute infiltrate. Mild hypoinflation. No pneumothorax or pleural effusion.    ASSESSMENT:    AMS    Handoff    MEWS Score    Acute myocardial infarction    CVA (cerebral vascular accident)    Intracerebral hemorrhage of brain stem    Brainstem stroke    Brain stem stroke syndrome    Brain stem hemorrhage    Brain stem stroke syndrome    Hemorrhagic stroke    Brainstem stroke    Encephalopathy acute    Functional quadriplegia    Advanced care planning/counseling discussion    Encounter for palliative care    Pontine hemorrhage    Neurogenic dysphagia    Chronic respiratory failure    Acute kidney injury superimposed on CKD    Acute urinary retention    Hypertensive emergency    Sepsis, unspecified organism    Sepsis    Gram-negative bacteremia    Percutaneous tracheal puncture    Altered mental status examination    EGD, with PEG    AMS    [email or web address]        PLAN:  44y Male PMH of possible stroke/MI present to [facility] after collapsing at work. Decorticate posturing, vomiting, intubated for airway protection. 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 Transferred to [facility] for further management. S/p trach 10/14. S/p peg 10/21. Re-upgrade to ICU 2/2 desaturation event and suctioning requirements 11/15.     Neuro:  - neuro q4/vitals q1  - pain control: tylenol prn  - vEEG (10/3-4)- negative, (10/17-10/19) - negative  - CTH 9/30: enlarged pontine hemorrhage, CTH 10/3: stable, CTH 10/25 anisocoric pupils showing mostly resolved pontine hemorrhage, no new hemorrhage or infarct, no herniation  - MRI brain w/ w/o contrast 10/12: parenchymal hemorrhage, acute/subacute R cerebellar stroke    - stroke core measures, stroke neuro signed off    CV:  - -160  - HTN: amlodipine 10 mg qd, HCTZ 12.5 daily  - echo (9/30) EF 75%    Resp:  - Trach to vent 60/430/5/12, CPAP as tolerated 5/5  - Secretions: duonebs/mucomyst/chest PT q8h   - CTA chest 11/13 negative for PE, inc ground glass opacities    GI:  - TF via PEG (placed 10/21 by gen surg)  - bowel regimen, last BM 11/17  - CTAP 11/8 negative for infection    - GI ppx: Protonix 40mg daily while on vent     Renal:  - +gabriel replaced 11/7 by urology 2/2 urinary retention; TOV 11/19   - FW flushes 250cc q6hrs   - IVL  - hyperK 10/20: lokelma 5mg qd  - Urinary retenion: Cardura 8 mg   - CKD: trend BUN/Cr  - renal US 10/1: echogenicity c/w chronic med renal dz, repeat 10/8: inc renal echogeicity, c/f medical renal disease w increased hydro     Endo:  - ISS  -  A1c 5.4    Heme:  - DVT ppx: SCDs, SQH 5000u q8h   - LE dopplers negative 11/11    ID:  - febrile, last pancx 11/16  -  s/p Stenotrophomonas maltophilia PNA: s/p Bactrim (11/18-11/19) s/p Cefepime (11/16-11/18)  - MRSA swab neg 11/16   - pan cultured 11/3, (+) sputum for stenotrophomas maltophlia, blood cx (+) klebsiella, cefepime 2gq12 (11/6 - 11/12)   - empiric tx: s/p zosyn (11/3-11/6) , s/p vanc  (11/3- 11/5)  - S/p Ancef (10/4-10/14) for PNA, and s/p Unasyn (10/18-10/23) +actinobacter baumanii    MISC:  - ophtho consult for keratitis, s/p erythromycin ointment to rt eye q4hrs, ofloxacin ointment to rt eye QID, artificial tears to rt eye q2hrs, moisture chamber at bedtime    Dispo: regional, full code, pending placement and emergency medicaid     D/w Dr. D'Amico and Abdirizak  S/Overnight events: BD 50. GEORGE overnight.     Hospital Course: 9/30: transferred from [facility]. A line placed. Versed dc'd. Cy Rader at bedside, states pt has family in [place], cannot confirm medications or PMH other than stroke and MI. 250cc bolus 3% given. LR switched to NS. hydralazine 25q8 started, 3% started, switched propofol to precedex   10/1: stability CTH done. Added labetalol, started TF. Palliative consulted. ethics consulted to determine surrogate. febrile 103, pan cx sent  10/2: BD 2, GEORGE overnight. TF resumed. Desatt'd to 80s, FiO2 inc. to 50. Fentanyl given, ABG, CXR ordered. Maxxed on precedex, started on propofol for DARIEN -4 - -5. Precedex dc'd. Duonebs, mucomyst, hypertonic added. 3% dc'd. Cardene dc'd. Start vanc/CTX. Increased labetalol 200q8. MRSA negative, dc'd vanc. ETT pulled back 2cm x 2, good positioning after confirmatory chest xray. Ethics attempting to establish HCP with family. Na 159, starting FW 250q6 for range 150-155.   10/3: BD3, GEORGE o/n, neuro stable. Na elevating, FW increased to 300q6. Dc'd bowel reg for diarrhea. vEEG started. SQH 5000q8 tonight.   10/4: BD 4, albumn bolus, incr. LR to 80 2/2 incr. in Cr, LR to 100cc/hr for uptrending Cr. Started 7% hypersal for 48hrs and SL atropine for inline/oral thick secretions. Dc'd CTX and started ancef for MSSA in the sputum. Nephrology consulted for CKD, f/u recs. SBP 170s, given hydralazine 10mg IVP.   10/5: BD5, o/n 10mg IVP hydralazine given for SBP 170s and started on hydralazine 25q8 via OGT. 10mg IV push labetalol for SBP > 160s. RT placed for diarrhea.   10/6: BD6, o/n FW increased to 350q4 per nephrology recs. IV tylenol for temp 100.6, SBp 160s presumed uncomfortable.   10/7: BD7, overnight pancultured for temp 101.8F.   10/8: BD8. GEORGE. Cr bumped. decreased LR to 75cc/hr. Adding simethicone ATC. incr hydralazine 50mgTID. Incr labetalol 300mgTID. Na 145, decreased FWF to 250q6. Start precedex. FENa consistent with intrinsic kidney injury. Pend repeat renal US. Retaining up to 1.3L, bladder scans q6, straight cath PRN  10/9: BD 9. GEORGE overnight. Neuro stable. abd xray for distention w non-specific gas pattern, OGT to LIWS for morning. duonebs/mucomyst to q8 for improving secretions. Changed tube feeds to Jevity 1.5 20cc/hr, low rate due to abdominal distention, nepro dense and more difficult to digest. Tolerating CPAP, confirmed by ABG.   10/10: BD 10. GEORGE overnight. Neuro stable. (+) gabriel for urinary retention on bladder scan. inc TF to goal rate of 40cc/hr. family leaning toward pursuing trach/PEG. 1/2 amp for FS 81.   10/11: BD 11. GEORGE overnight. Neuro stable. Trach/PEG consults placed.   10/12: BD 12. GEORGE overnight. Neuro stable. MRI brain complete.   10/13: BD 13. Increase flomax. Hold SQH after PM dose for trach tm. IVL.   10/14: BD 14. GEORGE overnight, remains on AC/VC. Gabriel placed for urinary retention. Dc'd free water.  S/p trach with pulm. NGT placed and CXR confirmed in good position.   10/15: BD 15, GEORGE ovn. resumed feeds. spiked 101, pan cx sent.   10/16: BD 16. GEORGE ovn. Lokelma 5mg for K+ 5.4. Started vanc q 24/zosyn for empiric PNA coverage, IVF to 100/hr. PEG held for fever.   10/17: BD 17,  ordered serum osm and urine osm for am. Started sinemet for neurostimulation. Increased cardura to 0.8. Started FW 100q4, dc'd IVF. MRSA negative, dc'd vanc. NGT replaced d/t coiling.   10/18: BD 18, GEORGE overnight, neuro stable. Amantadine added for neurostim. zosyn changed to unasyn for acinetobacter baumannii, failed TOV and required SC  10/19: BD 19, GEORGE ovn. cardura 2mg added for retention. labetalol decreased 200q8, hydralazine decreased 25q8. Gabriel replaced.   10/20: BD20, GEORGE overnight. NGT dislodged, replaced. PEG tomorrow w/ gen surg, FW increased to 150q4 and labetalol decreased to 100q8, lokelma given for hyperkalemia.   10/21: BD 21. POD0 PEG placement with Gen surg. decr labetolol to 50q8, incr. cardura to 0.4, started lokelma and phoslo, dc gabriel POD0 PEG placement with Gen surg.  10/22: BD 22. Plan to start TF today via PEG. dc labetalol, Following ophtho recs. Increased apnea settings - found to be in cheyne-moe respiration. CPAP 5/5.  10/23: BD 23. hydralazine d/c'd, trach collar trial today. Rectal tube placed at 6am.  10/24: BD24, o/n lokelma held due to diarrhea. Free water 100q6 resumed. dc'd tamsulosin, amantadine. Incr'd cardura to 8mg qhs. Dc'd FW. Switched jevity to nepro. gabriel placed for high urine output. Started SL atropine for oral secretions. Dc'd free water.  10/25: BD25, o/n decreased suctioning requirements to > q4hrs, GEORGE. Cr improving, cont phoslo, lokelma held at this time. Gabriel placed yest, cont. Tolerating trach collar. Given 500cc plasmalyte bolus for ANIKA. Dc'd sinemet.   10/26: BD26, o/n resumed lokelma 5mg daily and resumed 100cc free water q6hrs. Change in neuro status with new right pupillary dilation with anisocoria (right pupil 6mm fixed and left pupil 3mm briskly reactive). Given 23.4% NaCl bullet, taken for emergent CTH showing mostly resolved pontine hemorrhage, continued brainstem hypodensity likely edema d/t hemorrhage, no new hemorrhage or infarct, no herniation, mild increase in size of left lateral ventricle. Vitals remaine stable. Na goal > 140.   10/27: BD27, o/n GEORGE.Neuro stable. Pend stepdown with airway bed.   10/28: BD 28. GEORGE overnight. Neuro stable. Miralax ordered. Gabriel removed, pending TOV.  10/29: BD 29. GEORGE o/n. Given 2L NS over 8 hrs for increased BUN/Cr ratio. Gabriel placed for frequent straight cath.   10/30: BD 30.   10/31: BD 31. GEORGE overnight. Na 149, increased free water to 200q6. 1L NS for uptrending BUN.   11/1: BD 32. GEORGE overnight. Given 1L NS for dehydration. Na 146, increased FW to 250q6.   11/2: BD 33 GEORGE overnight, neuro stable, given 500cc bolus for net negative status and tachycardia   11/3: BD 34, GEORGE overnight, neuro stable. Patient remains tachycardic, EKG showing sinus tachycardia, given additional 500cc NS bolus. Febrile to 101.9F, pan cultured (without UA), CXR WNL, given tylenol.   11/4: BD 35, GEORGE overnight, neuro stable. Given 1L NS for tachycardia. sputum (+) for stenotropohomas maltophilia.   11/5: BD 36 GEORGE overnight, neuro stable. Vancomycin dc'd. Chest PT BID. ID consulted, cont zosyn.  11/6: BD 37. blood cx + klebsiella dc zosyn changed to cefepime, CTAP ordered, rpt blood cx sent.    11/7: BD 38. Pending CT A/P, given 250cc bolus and starting maintenance fluids overnight. Pending CT A/P after bolus   11/8: BD 39. CT CAP negative for infection.   11/9: BD 40. GEORGE overnight.  11/10: BD 41. GEORGE overnight. desat to 85 on trach collar, O2 inc to 10L and 100%, O2 sat inc to 95. pt tachy to 110s, euvolemic. given tylenol. ABG and CXR ordered. spiked fever, pancultured, RVP negative. AM ABG w pO2 79, rpt w pO2 79. pt appears comfortable, satting 94%.   11/11: BD 42. GEORGE overnight. pt became tachy to 130s, desat to 90 on 100% FiO2 and 10L. suctioned, (+) productive cough. temp 101.4, given 1g IV tylenol and 500cc NS bolus for euvolemia. fever and HR downtrending. LE dopplers negative for dvt  11/12: BD 43, GEORGE ovn, fever and HR downtrending, satting 97% 70% FIO2  11/13: BD 44, GEORGE ovn. started standing tylenol x24 hours for tachycardia. desat to 80s, o2 increased. CXR stable, pending CTA PE protocol.   11/14: BD 45, GEORGE overnight, neuro stable. resp therapy dec FiO2 to 70%.   11/15: BD 46, GEORGE overnight, neuro stable.  Rapid called for desaturation 30s, tachycardic 140s. Patient bagged, 100% fio2, heavily suctioned. CXR/POCUS unremarkable. ABG c/w desaturation. WBC 14.71. Afebrile. O2 improved to 90s and patient upgraded to ICU. ABG paO2 30s improved to 89 on vent. IV Tylenol x 1, sputum sent. Start protonix while on vent.   11/16: BD 47. POCUS showed collapsable IVCF, given 1L bolus. Vanco/Cefpime added empriic for PNA, NGT feeds restarted. MRSA swab neg, Vanc DC'd.   11/17: BD 48. GEORGE overnight. 1l bolus for tachycardia. Spiked to 101, cultured. 500cc bolus for tachycardia, tachy to 148 given 25mcg fentanyl, 250cc albumin, 1.5L bolus. 5 IV lopressor with response HR to 100s. +Stenotrophomonas on sputum cx.   11/18: BD 49. GEORGE overnight. Consulted ID, cefepime switched to bactrim x7days. Started hydrochlorothiazine 12.5mg daily.  11/19: BD 50. Tachy 120s, given tylenol and 500cc NS. Tolerating 5/5, will TCx. Holding phos binder. D/c Bactrim. D/c gabriel, f/u TOV.     Vital Signs Last 24 Hrs  T(C): 36.9 (19 Nov 2024 08:57), Max: 38.3 (18 Nov 2024 15:00)  T(F): 98.5 (19 Nov 2024 08:57), Max: 101 (18 Nov 2024 15:00)  HR: 83 (19 Nov 2024 12:00) (65 - 124)  BP: 133/95 (19 Nov 2024 12:00) (122/74 - 168/99)  BP(mean): 110 (19 Nov 2024 12:00) (91 - 126)  RR: 18 (19 Nov 2024 12:00) (16 - 18)  SpO2: 98% (19 Nov 2024 12:00) (93% - 100%)    Parameters below as of 19 Nov 2024 12:00  Patient On (Oxygen Delivery Method): tracheostomy collar    O2 Concentration (%): 40    I&O's Detail    18 Nov 2024 07:01  -  19 Nov 2024 07:00  --------------------------------------------------------  IN:    Free Water: 550 mL    Nepro with Carb Steady: 1080 mL    Sodium Chloride 0.9% Bolus: 500 mL  Total IN: 2130 mL    OUT:    Indwelling Catheter - Urethral (mL): 2160 mL  Total OUT: 2160 mL    Total NET: -30 mL    19 Nov 2024 07:01  -  19 Nov 2024 12:38  --------------------------------------------------------  IN:    Free Water: 250 mL    Nepro with Carb Steady: 120 mL  Total IN: 370 mL    OUT:    Indwelling Catheter - Urethral (mL): 110 mL  Total OUT: 110 mL    Total NET: 260 mL    I&O's Summary    18 Nov 2024 07:01  -  19 Nov 2024 07:00  --------------------------------------------------------  IN: 2130 mL / OUT: 2160 mL / NET: -30 mL    19 Nov 2024 07:01  -  19 Nov 2024 12:38  --------------------------------------------------------  IN: 370 mL / OUT: 110 mL / NET: 260 mL    PHYSICAL EXAM:  Gen: No acute distress, resting comfortably in bed  Skin: Warm and dry. No rashes or lesions.  EENT: PERRL 3+, vertical EOM, tracts vertically, spontaneously opens eyes, no conjunctival redness, sclera is clear, hearing intact to whisper test bilaterally, nares patent bilaterally.  Lungs: +trach to vent, breathing is non-labored, symmetrical chest wall movement, lung sounds are clear bilaterally without rales, rhonchi, or wheezing.   Heart: Regular rate, S1, and S2 without murmur or gallop  Abd: +PEG. Abdomen is soft, non-distended, and without ecchymosis. Normal bowel sounds on auscultation. No masses, hepatomegaly, or splenomegaly appreciated.  Peripheral vascular: Capillary refill <2 seconds; bilateral radial, posterior tibialis, and dorsalis pedis pulses 2/4.  Neuro: A&Ox0, intermittently follows commands, EOM intact vertically, opens eyes spontaneously, smiles on command, RUE triceps 4/5, bicep 0/5, hand  3/5; LUE not responsive to noxious stimuli; RLE wiggles toes on command; LLE withdraws from noxious stimuli *** REFLEXES *** Biceps, triceps, brachioradialis, patellar, Achilles reflexes 2+/5 bilaterally.  Wounds/Drains: None     TUBES/LINES:  [] CVC  [] A-line  [] Lumbar Drain  [] Ventriculostomy  [] Gabriel  [] Other    DIET:  [] NPO  [] Mechanical  [X] Tube feeds    LABS:                        9.9    9.71  )-----------( 188      ( 19 Nov 2024 05:30 )             30.8     11-19    141  |  112[H]  |  50[H]  ----------------------------<  146[H]  4.0   |  19[L]  |  1.81[H]    Ca    9.0      19 Nov 2024 05:30  Phos  2.2     11-19  Mg     2.0     11-19    Urinalysis Basic - ( 19 Nov 2024 05:30 )    Color: x / Appearance: x / SG: x / pH: x  Gluc: 146 mg/dL / Ketone: x  / Bili: x / Urobili: x   Blood: x / Protein: x / Nitrite: x   Leuk Esterase: x / RBC: x / WBC x   Sq Epi: x / Non Sq Epi: x / Bacteria: x    CAPILLARY BLOOD GLUCOSE    POCT Blood Glucose.: 108 mg/dL (19 Nov 2024 11:44)  POCT Blood Glucose.: 142 mg/dL (19 Nov 2024 05:39)  POCT Blood Glucose.: 119 mg/dL (18 Nov 2024 23:03)  POCT Blood Glucose.: 119 mg/dL (18 Nov 2024 16:01)    Drug Levels: N/A    CSF Analysis: N/A    Allergies Unknown    Allergy Status Unknown    Intolerances Unknown    MEDICATIONS:  Antibiotics: D/c'd Bactrim morning of 11/19    Neuro:  acetaminophen   Oral Liquid .. 650 milliGRAM(s) Oral every 6 hours PRN    Anticoagulation:  heparin   Injectable 5000 Unit(s) SubCutaneous every 8 hours    OTHER:  acetylcysteine 10%  Inhalation 4 milliLiter(s) Inhalation every 8 hours  albuterol/ipratropium for Nebulization 3 milliLiter(s) Nebulizer every 8 hours  amLODIPine   Tablet 10 milliGRAM(s) Oral daily  artificial tears (preservative free) Ophthalmic Solution 1 Drop(s) Right EYE every 4 hours  chlorhexidine 0.12% Liquid 15 milliLiter(s) Oral Mucosa every 12 hours  doxazosin 8 milliGRAM(s) Oral at bedtime  hydrochlorothiazide 12.5 milliGRAM(s) Oral daily  insulin lispro (ADMELOG) corrective regimen sliding scale   SubCutaneous every 6 hours  pantoprazole   Suspension 40 milliGRAM(s) Oral daily  petrolatum Ophthalmic Ointment 1 Application(s) Both EYES two times a day  polyethylene glycol 3350 17 Gram(s) Oral two times a day  sodium zirconium cyclosilicate 5 Gram(s) Oral daily    IVF: None.    CULTURES:  Culture Results:   No growth at 48 Hours (11-17 @ 05:59)  Culture Results:   Mixed gram negative rods including  Moderate Stenotrophomonas maltophilia  Commensal iram consistent with body site (11-16 @ 01:53)    RADIOLOGY & ADDITIONAL TESTS:  Xray Chest 1 View- PORTABLE-Routine (Xray Chest 1 View- PORTABLE-Routine in AM.) (11.19.24 @ 07:00) >  IMPRESSION:    Similar appearance to prior exam 11/17/2024. No lung consolidation or   acute infiltrate. Mild hypoinflation. No pneumothorax or pleural effusion.    ASSESSMENT:  44y Male PMH of possible stroke/MI present to [facility] after collapsing at work. Decorticate posturing, vomiting, intubated for airway protection. 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 Transferred to [facility] for further management. S/p trach 10/14. S/p peg 10/21. Re-upgrade to ICU 2/2 desaturation event and suctioning requirements 11/15.     AMS    Handoff    MEWS Score    Acute myocardial infarction    CVA (cerebral vascular accident)    Intracerebral hemorrhage of brain stem    Brainstem stroke    Brain stem stroke syndrome    Brain stem hemorrhage    Brain stem stroke syndrome    Hemorrhagic stroke    Brainstem stroke    Encephalopathy acute    Functional quadriplegia    Advanced care planning/counseling discussion    Encounter for palliative care    Pontine hemorrhage    Neurogenic dysphagia    Chronic respiratory failure    Acute kidney injury superimposed on CKD    Acute urinary retention    Hypertensive emergency    Sepsis, unspecified organism    Sepsis    Gram-negative bacteremia    Percutaneous tracheal puncture    Altered mental status examination    EGD, with PEG    AMS    [email or web address]      PLAN:  Neuro:  - neuro q4/vitals q1  - pain control: tylenol prn  - vEEG (10/3-4)- negative, (10/17-10/19) - negative  - CTH 9/30: enlarged pontine hemorrhage, CTH 10/3: stable, CTH 10/25 anisocoric pupils showing mostly resolved pontine hemorrhage, no new hemorrhage or infarct, no herniation  - MRI brain w/ w/o contrast 10/12: parenchymal hemorrhage, acute/subacute R cerebellar stroke    - stroke core measures, stroke neuro signed off    CV:  - SBP goal 100-160  - HTN: amlodipine 10 mg qd, HCTZ 12.5 daily  - echo (9/30) EF 75%    Resp:  - Trach to vent 60/430/5/12, CPAP as tolerated 5/5; plan to transition to trach collar 11/19.   - Secretions: duonebs/mucomyst/chest PT q8h   - CTA chest 11/13 negative for PE, inc ground glass opacities    GI:  - TF via PEG (placed 10/21 by gen surg)  - bowel regimen, last BM 11/17  - CTAP 11/8 negative for infection    - GI ppx: Protonix 40mg daily while on vent     Renal:  - +gabriel replaced 11/7 by urology 2/2 urinary retention; TOV 11/19   - FW flushes 250cc q6hrs   - IVL  - hyperK 10/20: lokelma 5mg qd  - Urinary retenion: Cardura 8 mg   - CKD: trend BUN/Cr  - renal US 10/1: echogenicity c/w chronic med renal dz, repeat 10/8: inc renal echogeicity, c/f medical renal disease w increased hydro     Endo:  - ISS  - A1c 5.4    Heme:  - DVT ppx: SCDs, SQH 5000u q8h   - LE dopplers negative 11/11    ID:  - febrile, last pancx 11/16  -  s/p Stenotrophomonas maltophilia PNA: s/p Bactrim (11/18-11/19) s/p Cefepime (11/16-11/18)  - MRSA swab neg 11/16   - pan cultured 11/3, (+) sputum for stenotrophomas maltophlia, blood cx (+) klebsiella, cefepime 2gq12 (11/6 - 11/12)   - empiric tx: s/p zosyn (11/3-11/6) , s/p vanc  (11/3- 11/5)  - S/p Ancef (10/4-10/14) for PNA, and s/p Unasyn (10/18-10/23) +actinobacter baumanii    MISC:  - ophtho consult for keratitis, s/p erythromycin ointment to rt eye q4hrs, ofloxacin ointment to rt eye QID, artificial tears to rt eye q2hrs, moisture chamber at bedtime    Dispo: regional, full code, pending placement and emergency medicaid; PT/OT/Speech recs pending trach collar.  S/Overnight events: BD 50. GEORGE overnight.     Hospital Course: 9/30: transferred from [facility]. A line placed. Versed dc'd. Cy Rader at bedside, states pt has family in [place], cannot confirm medications or PMH other than stroke and MI. 250cc bolus 3% given. LR switched to NS. hydralazine 25q8 started, 3% started, switched propofol to precedex   10/1: stability CTH done. Added labetalol, started TF. Palliative consulted. ethics consulted to determine surrogate. febrile 103, pan cx sent  10/2: BD 2, GEORGE overnight. TF resumed. Desatt'd to 80s, FiO2 inc. to 50. Fentanyl given, ABG, CXR ordered. Maxxed on precedex, started on propofol for DARINE -4 - -5. Precedex dc'd. Duonebs, mucomyst, hypertonic added. 3% dc'd. Cardene dc'd. Start vanc/CTX. Increased labetalol 200q8. MRSA negative, dc'd vanc. ETT pulled back 2cm x 2, good positioning after confirmatory chest xray. Ethics attempting to establish HCP with family. Na 159, starting FW 250q6 for range 150-155.   10/3: BD3, GEORGE o/n, neuro stable. Na elevating, FW increased to 300q6. Dc'd bowel reg for diarrhea. vEEG started. SQH 5000q8 tonight.   10/4: BD 4, albumn bolus, incr. LR to 80 2/2 incr. in Cr, LR to 100cc/hr for uptrending Cr. Started 7% hypersal for 48hrs and SL atropine for inline/oral thick secretions. Dc'd CTX and started ancef for MSSA in the sputum. Nephrology consulted for CKD, f/u recs. SBP 170s, given hydralazine 10mg IVP.   10/5: BD5, o/n 10mg IVP hydralazine given for SBP 170s and started on hydralazine 25q8 via OGT. 10mg IV push labetalol for SBP > 160s. RT placed for diarrhea.   10/6: BD6, o/n FW increased to 350q4 per nephrology recs. IV tylenol for temp 100.6, SBp 160s presumed uncomfortable.   10/7: BD7, overnight pancultured for temp 101.8F.   10/8: BD8. GEORGE. Cr bumped. decreased LR to 75cc/hr. Adding simethicone ATC. incr hydralazine 50mgTID. Incr labetalol 300mgTID. Na 145, decreased FWF to 250q6. Start precedex. FENa consistent with intrinsic kidney injury. Pend repeat renal US. Retaining up to 1.3L, bladder scans q6, straight cath PRN  10/9: BD 9. GEORGE overnight. Neuro stable. abd xray for distention w non-specific gas pattern, OGT to LIWS for morning. duonebs/mucomyst to q8 for improving secretions. Changed tube feeds to Jevity 1.5 20cc/hr, low rate due to abdominal distention, nepro dense and more difficult to digest. Tolerating CPAP, confirmed by ABG.   10/10: BD 10. GEORGE overnight. Neuro stable. (+) gabriel for urinary retention on bladder scan. inc TF to goal rate of 40cc/hr. family leaning toward pursuing trach/PEG. 1/2 amp for FS 81.   10/11: BD 11. GEORGE overnight. Neuro stable. Trach/PEG consults placed.   10/12: BD 12. GEORGE overnight. Neuro stable. MRI brain complete.   10/13: BD 13. Increase flomax. Hold SQH after PM dose for trach tm. IVL.   10/14: BD 14. GEORGE overnight, remains on AC/VC. Gabriel placed for urinary retention. Dc'd free water.  S/p trach with pulm. NGT placed and CXR confirmed in good position.   10/15: BD 15, GEORGE ovn. resumed feeds. spiked 101, pan cx sent.   10/16: BD 16. GEORGE ovn. Lokelma 5mg for K+ 5.4. Started vanc q 24/zosyn for empiric PNA coverage, IVF to 100/hr. PEG held for fever.   10/17: BD 17,  ordered serum osm and urine osm for am. Started sinemet for neurostimulation. Increased cardura to 0.8. Started FW 100q4, dc'd IVF. MRSA negative, dc'd vanc. NGT replaced d/t coiling.   10/18: BD 18, GEORGE overnight, neuro stable. Amantadine added for neurostim. zosyn changed to unasyn for acinetobacter baumannii, failed TOV and required SC  10/19: BD 19, GEORGE ovn. cardura 2mg added for retention. labetalol decreased 200q8, hydralazine decreased 25q8. Gabriel replaced.   10/20: BD20, GEORGE overnight. NGT dislodged, replaced. PEG tomorrow w/ gen surg, FW increased to 150q4 and labetalol decreased to 100q8, lokelma given for hyperkalemia.   10/21: BD 21. POD0 PEG placement with Gen surg. decr labetolol to 50q8, incr. cardura to 0.4, started lokelma and phoslo, dc gabriel POD0 PEG placement with Gen surg.  10/22: BD 22. Plan to start TF today via PEG. dc labetalol, Following ophtho recs. Increased apnea settings - found to be in cheyne-moe respiration. CPAP 5/5.  10/23: BD 23. hydralazine d/c'd, trach collar trial today. Rectal tube placed at 6am.  10/24: BD24, o/n lokelma held due to diarrhea. Free water 100q6 resumed. dc'd tamsulosin, amantadine. Incr'd cardura to 8mg qhs. Dc'd FW. Switched jevity to nepro. gabriel placed for high urine output. Started SL atropine for oral secretions. Dc'd free water.  10/25: BD25, o/n decreased suctioning requirements to > q4hrs, GEORGE. Cr improving, cont phoslo, lokelma held at this time. Gabriel placed yest, cont. Tolerating trach collar. Given 500cc plasmalyte bolus for ANIKA. Dc'd sinemet.   10/26: BD26, o/n resumed lokelma 5mg daily and resumed 100cc free water q6hrs. Change in neuro status with new right pupillary dilation with anisocoria (right pupil 6mm fixed and left pupil 3mm briskly reactive). Given 23.4% NaCl bullet, taken for emergent CTH showing mostly resolved pontine hemorrhage, continued brainstem hypodensity likely edema d/t hemorrhage, no new hemorrhage or infarct, no herniation, mild increase in size of left lateral ventricle. Vitals remaine stable. Na goal > 140.   10/27: BD27, o/n GEORGE.Neuro stable. Pend stepdown with airway bed.   10/28: BD 28. GEORGE overnight. Neuro stable. Miralax ordered. Gabriel removed, pending TOV.  10/29: BD 29. GEORGE o/n. Given 2L NS over 8 hrs for increased BUN/Cr ratio. Gabriel placed for frequent straight cath.   10/30: BD 30.   10/31: BD 31. GEORGE overnight. Na 149, increased free water to 200q6. 1L NS for uptrending BUN.   11/1: BD 32. GEORGE overnight. Given 1L NS for dehydration. Na 146, increased FW to 250q6.   11/2: BD 33 GEORGE overnight, neuro stable, given 500cc bolus for net negative status and tachycardia   11/3: BD 34, GEORGE overnight, neuro stable. Patient remains tachycardic, EKG showing sinus tachycardia, given additional 500cc NS bolus. Febrile to 101.9F, pan cultured (without UA), CXR WNL, given tylenol.   11/4: BD 35, GEORGE overnight, neuro stable. Given 1L NS for tachycardia. sputum (+) for stenotropohomas maltophilia.   11/5: BD 36 GEORGE overnight, neuro stable. Vancomycin dc'd. Chest PT BID. ID consulted, cont zosyn.  11/6: BD 37. blood cx + klebsiella dc zosyn changed to cefepime, CTAP ordered, rpt blood cx sent.    11/7: BD 38. Pending CT A/P, given 250cc bolus and starting maintenance fluids overnight. Pending CT A/P after bolus   11/8: BD 39. CT CAP negative for infection.   11/9: BD 40. GEORGE overnight.  11/10: BD 41. GEORGE overnight. desat to 85 on trach collar, O2 inc to 10L and 100%, O2 sat inc to 95. pt tachy to 110s, euvolemic. given tylenol. ABG and CXR ordered. spiked fever, pancultured, RVP negative. AM ABG w pO2 79, rpt w pO2 79. pt appears comfortable, satting 94%.   11/11: BD 42. GEORGE overnight. pt became tachy to 130s, desat to 90 on 100% FiO2 and 10L. suctioned, (+) productive cough. temp 101.4, given 1g IV tylenol and 500cc NS bolus for euvolemia. fever and HR downtrending. LE dopplers negative for dvt  11/12: BD 43, GEORGE ovn, fever and HR downtrending, satting 97% 70% FIO2  11/13: BD 44, GEORGE ovn. started standing tylenol x24 hours for tachycardia. desat to 80s, o2 increased. CXR stable, pending CTA PE protocol.   11/14: BD 45, GEORGE overnight, neuro stable. resp therapy dec FiO2 to 70%.   11/15: BD 46, GEORGE overnight, neuro stable.  Rapid called for desaturation 30s, tachycardic 140s. Patient bagged, 100% fio2, heavily suctioned. CXR/POCUS unremarkable. ABG c/w desaturation. WBC 14.71. Afebrile. O2 improved to 90s and patient upgraded to ICU. ABG paO2 30s improved to 89 on vent. IV Tylenol x 1, sputum sent. Start protonix while on vent.   11/16: BD 47. POCUS showed collapsable IVCF, given 1L bolus. Vanco/Cefpime added empriic for PNA, NGT feeds restarted. MRSA swab neg, Vanc DC'd.   11/17: BD 48. GEORGE overnight. 1l bolus for tachycardia. Spiked to 101, cultured. 500cc bolus for tachycardia, tachy to 148 given 25mcg fentanyl, 250cc albumin, 1.5L bolus. 5 IV lopressor with response HR to 100s. +Stenotrophomonas on sputum cx.   11/18: BD 49. GEORGE overnight. Consulted ID, cefepime switched to bactrim x7days. Started hydrochlorothiazine 12.5mg daily.  11/19: BD 50. Tachy 120s, given tylenol and 500cc NS. Tolerating 5/5, will TCx. Holding phos binder. D/c Bactrim. D/c gabriel, f/u TOV.     Vital Signs Last 24 Hrs  T(C): 36.9 (19 Nov 2024 08:57), Max: 38.3 (18 Nov 2024 15:00)  T(F): 98.5 (19 Nov 2024 08:57), Max: 101 (18 Nov 2024 15:00)  HR: 83 (19 Nov 2024 12:00) (65 - 124)  BP: 133/95 (19 Nov 2024 12:00) (122/74 - 168/99)  BP(mean): 110 (19 Nov 2024 12:00) (91 - 126)  RR: 18 (19 Nov 2024 12:00) (16 - 18)  SpO2: 98% (19 Nov 2024 12:00) (93% - 100%)    Parameters below as of 19 Nov 2024 12:00  Patient On (Oxygen Delivery Method): tracheostomy collar    O2 Concentration (%): 40    I&O's Detail    18 Nov 2024 07:01  -  19 Nov 2024 07:00  --------------------------------------------------------  IN:    Free Water: 550 mL    Nepro with Carb Steady: 1080 mL    Sodium Chloride 0.9% Bolus: 500 mL  Total IN: 2130 mL    OUT:    Indwelling Catheter - Urethral (mL): 2160 mL  Total OUT: 2160 mL    Total NET: -30 mL    19 Nov 2024 07:01  -  19 Nov 2024 12:38  --------------------------------------------------------  IN:    Free Water: 250 mL    Nepro with Carb Steady: 120 mL  Total IN: 370 mL    OUT:    Indwelling Catheter - Urethral (mL): 110 mL  Total OUT: 110 mL    Total NET: 260 mL    I&O's Summary    18 Nov 2024 07:01  -  19 Nov 2024 07:00  --------------------------------------------------------  IN: 2130 mL / OUT: 2160 mL / NET: -30 mL    19 Nov 2024 07:01  -  19 Nov 2024 12:38  --------------------------------------------------------  IN: 370 mL / OUT: 110 mL / NET: 260 mL    PHYSICAL EXAM:  Gen: No acute distress, resting comfortably in bed  Skin: Warm and dry. No rashes or lesions.  EENT: PERRL 3+, vertical EOM, tracts vertically, spontaneously opens eyes, no conjunctival redness, sclera is clear, hearing intact to whisper test bilaterally, nares patent bilaterally.  Lungs: +trach to vent, breathing is non-labored, symmetrical chest wall movement, lung sounds are clear bilaterally without rales, rhonchi, or wheezing.   Heart: Regular rate, S1, and S2 without murmur or gallop  Abd: +PEG. Abdomen is soft, non-distended. Normal bowel sounds on auscultation. No masses, hepatomegaly, or splenomegaly appreciated.  Peripheral vascular: Capillary refill <2 seconds; bilateral radial, posterior tibialis, and dorsalis pedis pulses 2/4.  Neuro: A&Ox0, intermittently follows commands, EOM intact vertically, opens eyes spontaneously, smiles on command; strength RUE triceps 4/5, bicep 0/5, hand  3/5; LUE not responsive to noxious stimuli; RLE wiggles toes on command; LLE withdraws from noxious stimuli. Bilateral biceps, brachioradialis, patellar, and reflexes 3/5.  Wounds/Drains: +Gabriel with clear yellow urine    TUBES/LINES:  [] CVC  [] A-line  [] Lumbar Drain  [] Ventriculostomy  [X] Gabriel  [] Other    DIET:  [] NPO  [] Mechanical  [X] Tube feeds    LABS:                        9.9    9.71  )-----------( 188      ( 19 Nov 2024 05:30 )             30.8     11-19    141  |  112[H]  |  50[H]  ----------------------------<  146[H]  4.0   |  19[L]  |  1.81[H]    Ca    9.0      19 Nov 2024 05:30  Phos  2.2     11-19  Mg     2.0     11-19    Urinalysis Basic - ( 19 Nov 2024 05:30 )    Color: x / Appearance: x / SG: x / pH: x  Gluc: 146 mg/dL / Ketone: x  / Bili: x / Urobili: x   Blood: x / Protein: x / Nitrite: x   Leuk Esterase: x / RBC: x / WBC x   Sq Epi: x / Non Sq Epi: x / Bacteria: x    CAPILLARY BLOOD GLUCOSE    POCT Blood Glucose.: 108 mg/dL (19 Nov 2024 11:44)  POCT Blood Glucose.: 142 mg/dL (19 Nov 2024 05:39)  POCT Blood Glucose.: 119 mg/dL (18 Nov 2024 23:03)  POCT Blood Glucose.: 119 mg/dL (18 Nov 2024 16:01)    Drug Levels: N/A    CSF Analysis: N/A    Allergies Unknown    Allergy Status Unknown    Intolerances Unknown    MEDICATIONS:  Antibiotics: D/c'd Bactrim morning of 11/19    Neuro:  acetaminophen   Oral Liquid .. 650 milliGRAM(s) Oral every 6 hours PRN    Anticoagulation:  heparin   Injectable 5000 Unit(s) SubCutaneous every 8 hours    OTHER:  acetylcysteine 10%  Inhalation 4 milliLiter(s) Inhalation every 8 hours  albuterol/ipratropium for Nebulization 3 milliLiter(s) Nebulizer every 8 hours  amLODIPine   Tablet 10 milliGRAM(s) Oral daily  artificial tears (preservative free) Ophthalmic Solution 1 Drop(s) Right EYE every 4 hours  chlorhexidine 0.12% Liquid 15 milliLiter(s) Oral Mucosa every 12 hours  doxazosin 8 milliGRAM(s) Oral at bedtime  hydrochlorothiazide 12.5 milliGRAM(s) Oral daily  insulin lispro (ADMELOG) corrective regimen sliding scale   SubCutaneous every 6 hours  pantoprazole   Suspension 40 milliGRAM(s) Oral daily  petrolatum Ophthalmic Ointment 1 Application(s) Both EYES two times a day  polyethylene glycol 3350 17 Gram(s) Oral two times a day  sodium zirconium cyclosilicate 5 Gram(s) Oral daily    IVF: Sodium chloride 0.9% 500ml bolus given at 500ml/hr x 1 hr; Free water 250ml flushes q6hrs    CULTURES:  Culture Results:   No growth at 48 Hours (11-17 @ 05:59)  Culture Results:   Mixed gram negative rods including  Moderate Stenotrophomonas maltophilia  Commensal iram consistent with body site (11-16 @ 01:53)    RADIOLOGY & ADDITIONAL TESTS:  Xray Chest 1 View- PORTABLE-Routine (Xray Chest 1 View- PORTABLE-Routine in AM.) (11.19.24 @ 07:00) >  IMPRESSION:    Similar appearance to prior exam 11/17/2024. No lung consolidation or   acute infiltrate. Mild hypoinflation. No pneumothorax or pleural effusion.    ASSESSMENT:  44y Male PMH of possible stroke/MI present to [facility] after collapsing at work. Decorticate posturing, vomiting, intubated for airway protection. 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 Transferred to [facility] for further management. S/p trach 10/14. S/p peg 10/21. Re-upgrade to ICU 2/2 desaturation event and suctioning requirements 11/15.     AMS    Handoff    MEWS Score    Acute myocardial infarction    CVA (cerebral vascular accident)    Intracerebral hemorrhage of brain stem    Brainstem stroke    Brain stem stroke syndrome    Brain stem hemorrhage    Brain stem stroke syndrome    Hemorrhagic stroke    Brainstem stroke    Encephalopathy acute    Functional quadriplegia    Advanced care planning/counseling discussion    Encounter for palliative care    Pontine hemorrhage    Neurogenic dysphagia    Chronic respiratory failure    Acute kidney injury superimposed on CKD    Acute urinary retention    Hypertensive emergency    Sepsis, unspecified organism    Sepsis    Gram-negative bacteremia    Percutaneous tracheal puncture    Altered mental status examination    EGD, with PEG    AMS    SysAdmin_VisitLink      PLAN:  Neuro:  - neuro q4/vitals q1  - pain control: tylenol prn  - vEEG (10/3-4)- negative, (10/17-10/19) - negative  - CTH 9/30: enlarged pontine hemorrhage, CTH 10/3: stable, CTH 10/25 anisocoric pupils showing mostly resolved pontine hemorrhage, no new hemorrhage or infarct, no herniation  - MRI brain w/ w/o contrast 10/12: parenchymal hemorrhage, acute/subacute R cerebellar stroke    - stroke core measures, stroke neuro signed off    CV:  - SBP goal 100-160  - HTN: amlodipine 10 mg qd, HCTZ 12.5 daily  - echo (9/30) EF 75%    Resp:  - Trach to vent 60/430/5/12, CPAP as tolerated 5/5; plan to transition to trach collar 11/19.   - Secretions: duonebs/mucomyst/chest PT q8h   - CTA chest 11/13 negative for PE, inc ground glass opacities    GI:  - TF via PEG (placed 10/21 by gen surg)  - bowel regimen, last BM 11/17  - CTAP 11/8 negative for infection    - GI ppx: Protonix 40mg daily while on vent     Renal:  - +gabriel replaced 11/7 by urology 2/2 urinary retention; TOV 11/19   - FW flushes 250cc q6hrs   - IVL  - hyperK 10/20: lokelma 5mg qd  - Urinary retenion: Cardura 8 mg   - CKD: trend BUN/Cr  - renal US 10/1: echogenicity c/w chronic med renal dz, repeat 10/8: inc renal echogeicity, c/f medical renal disease w increased hydro     Endo:  - ISS  - A1c 5.4    Heme:  - DVT ppx: SCDs, SQH 5000u q8h   - LE dopplers negative 11/11    ID:  - afebrile as of 11/18 at 18:00; last pancx 11/16  -  s/p Stenotrophomonas maltophilia PNA: s/p Bactrim (11/18-11/19) s/p Cefepime (11/16-11/18)  - MRSA swab neg 11/16   - pan cultured 11/3, (+) sputum for stenotrophomas maltophlia, blood cx (+) klebsiella, cefepime 2gq12 (11/6 - 11/12)   - empiric tx: s/p zosyn (11/3-11/6) , s/p vanc  (11/3- 11/5)  - S/p Ancef (10/4-10/14) for PNA, and s/p Unasyn (10/18-10/23) +actinobacter baumanii    MISC:  - ophtho consult for keratitis, s/p erythromycin ointment to rt eye q4hrs, ofloxacin ointment to rt eye QID, artificial tears to rt eye q2hrs, moisture chamber at bedtime    Dispo: regional, full code, pending placement and emergency medicaid; PT/OT/Speech recs pending trach collar.  S/Overnight events: BD 50. GEORGE overnight.     Hospital Course: 9/30: transferred from [facility]. A line placed. Versed dc'd. Roommate Rader at bedside, states pt has family in [place], cannot confirm medications or PMH other than stroke and MI. 250cc bolus 3% given. LR switched to NS. hydralazine 25q8 started, 3% started, switched propofol to precedex   10/1: stability CTH done. Added labetalol, started TF. Palliative consulted. ethics consulted to determine surrogate. febrile 103, pan cx sent  10/2: BD 2, GEORGE overnight. TF resumed. Desatt'd to 80s, FiO2 inc. to 50. Fentanyl given, ABG, CXR ordered. Maxed on precedex, started on propofol for DARIEN -4 - -5. Precedex dc'd. Duonebs, mucomyst, hypertonic added. 3% dc'd. Cardene dc'd. Start vanc/CTX. Increased labetalol 200q8. MRSA negative, dc'd vanc. ETT pulled back 2cm x 2, good positioning after confirmatory chest xray. Ethics attempting to establish HCP with family. Na 159, starting FW 250q6 for range 150-155.   10/3: BD3, GEORGE o/n, neuro stable. Na elevating, FW increased to 300q6. Dc'd bowel reg for diarrhea. vEEG started. SQH 5000q8 tonight.   10/4: BD 4, albumn bolus, incr. LR to 80 2/2 incr. in Cr, LR to 100cc/hr for uptrending Cr. Started 7% hypersal for 48hrs and SL atropine for inline/oral thick secretions. Dc'd CTX and started ancef for MSSA in the sputum. Nephrology consulted for CKD, f/u recs. SBP 170s, given hydralazine 10mg IVP.   10/5: BD5, o/n 10mg IVP hydralazine given for SBP 170s and started on hydralazine 25q8 via OGT. 10mg IV push labetalol for SBP > 160s. RT placed for diarrhea.   10/6: BD6, o/n FW increased to 350q4 per nephrology recs. IV tylenol for temp 100.6, SBp 160s presumed uncomfortable.   10/7: BD7, overnight pancultured for temp 101.8F.   10/8: BD8. GEORGE. Cr bumped. decreased LR to 75cc/hr. Adding simethicone ATC. incr hydralazine 50mgTID. Incr labetalol 300mgTID. Na 145, decreased FWF to 250q6. Start precedex. FENa consistent with intrinsic kidney injury. Pend repeat renal US. Retaining up to 1.3L, bladder scans q6, straight cath PRN  10/9: BD 9. GEORGE overnight. Neuro stable. abd xray for distention w non-specific gas pattern, OGT to LIWS for morning. duonebs/mucomyst to q8 for improving secretions. Changed tube feeds to Jevity 1.5 20cc/hr, low rate due to abdominal distention, nepro dense and more difficult to digest. Tolerating CPAP, confirmed by ABG.   10/10: BD 10. GEORGE overnight. Neuro stable. (+) gabriel for urinary retention on bladder scan. inc TF to goal rate of 40cc/hr. family leaning toward pursuing trach/PEG. 1/2 amp for FS 81.   10/11: BD 11. GEORGE overnight. Neuro stable. Trach/PEG consults placed.   10/12: BD 12. GEORGE overnight. Neuro stable. MRI brain complete.   10/13: BD 13. Increase flomax. Hold SQH after PM dose for trach tm. IVL.   10/14: BD 14. GEORGE overnight, remains on AC/VC. Gabriel placed for urinary retention. Dc'd free water.  S/p trach with pulm. NGT placed and CXR confirmed in good position.   10/15: BD 15, GEORGE ovn. resumed feeds. spiked 101, pan cx sent.   10/16: BD 16. GEORGE ovn. Lokelma 5mg for K+ 5.4. Started vanc q 24/zosyn for empiric PNA coverage, IVF to 100/hr. PEG held for fever.   10/17: BD 17,  ordered serum osm and urine osm for am. Started sinemet for neurostimulation. Increased cardura to 0.8. Started FW 100q4, dc'd IVF. MRSA negative, dc'd vanc. NGT replaced d/t coiling.   10/18: BD 18, GEORGE overnight, neuro stable. Amantadine added for neurostim. zosyn changed to unasyn for acinetobacter baumannii, failed TOV and required SC  10/19: BD 19, GEORGE ovn. cardura 2mg added for retention. labetalol decreased 200q8, hydralazine decreased 25q8. Gabriel replaced.   10/20: BD20, GEORGE overnight. NGT dislodged, replaced. PEG tomorrow w/ gen surg, FW increased to 150q4 and labetalol decreased to 100q8, lokelma given for hyperkalemia.   10/21: BD 21. POD0 PEG placement with Gen surg. decr labetolol to 50q8, incr. cardura to 0.4, started lokelma and phoslo, dc gabriel POD0 PEG placement with Gen surg.  10/22: BD 22. Plan to start TF today via PEG. dc labetalol, Following ophtho recs. Increased apnea settings - found to be in cheyne-moe respiration. CPAP 5/5.  10/23: BD 23. hydralazine d/c'd, trach collar trial today. Rectal tube placed at 6am.  10/24: BD24, o/n lokelma held due to diarrhea. Free water 100q6 resumed. dc'd tamsulosin, amantadine. Incr'd cardura to 8mg qhs. Dc'd FW. Switched jevity to nepro. gabriel placed for high urine output. Started SL atropine for oral secretions. Dc'd free water.  10/25: BD25, o/n decreased suctioning requirements to > q4hrs, GEORGE. Cr improving, cont phoslo, lokelma held at this time. Gabriel placed yest, cont. Tolerating trach collar. Given 500cc plasmalyte bolus for ANIKA. Dc'd sinemet.   10/26: BD26, o/n resumed lokelma 5mg daily and resumed 100cc free water q6hrs. Change in neuro status with new right pupillary dilation with anisocoria (right pupil 6mm fixed and left pupil 3mm briskly reactive). Given 23.4% NaCl bullet, taken for emergent CTH showing mostly resolved pontine hemorrhage, continued brainstem hypodensity likely edema d/t hemorrhage, no new hemorrhage or infarct, no herniation, mild increase in size of left lateral ventricle. Vitals remain stable. Na goal > 140.   10/27: BD27, o/n GEORGE.Neuro stable. Pend stepdown with airway bed.   10/28: BD 28. GEORGE overnight. Neuro stable. Miralax ordered. Gabriel removed, pending TOV.  10/29: BD 29. GEORGE o/n. Given 2L NS over 8 hrs for increased BUN/Cr ratio. Gabriel placed for frequent straight cath.   10/30: BD 30.   10/31: BD 31. GEORGE overnight. Na 149, increased free water to 200q6. 1L NS for uptrending BUN.   11/1: BD 32. GEORGE overnight. Given 1L NS for dehydration. Na 146, increased FW to 250q6.   11/2: BD 33 GEORGE overnight, neuro stable, given 500cc bolus for net negative status and tachycardia   11/3: BD 34, GEORGE overnight, neuro stable. Patient remains tachycardic, EKG showing sinus tachycardia, given additional 500cc NS bolus. Febrile to 101.9F, pan cultured (without UA), CXR WNL, given tylenol.   11/4: BD 35, GEORGE overnight, neuro stable. Given 1L NS for tachycardia. sputum (+) for stenotropohomas maltophilia.   11/5: BD 36 GEORGE overnight, neuro stable. Vancomycin dc'd. Chest PT BID. ID consulted, cont zosyn.  11/6: BD 37. blood cx + klebsiella dc zosyn changed to cefepime, CTAP ordered, rpt blood cx sent.    11/7: BD 38. Pending CT A/P, given 250cc bolus and starting maintenance fluids overnight. Pending CT A/P after bolus   11/8: BD 39. CT CAP negative for infection.   11/9: BD 40. GEORGE overnight.  11/10: BD 41. GEORGE overnight. desat to 85 on trach collar, O2 inc to 10L and 100%, O2 sat inc to 95. pt tachy to 110s, euvolemic. given tylenol. ABG and CXR ordered. spiked fever, pancultured, RVP negative. AM ABG w pO2 79, rpt w pO2 79. pt appears comfortable, satting 94%.   11/11: BD 42. GEORGE overnight. pt became tachy to 130s, desat to 90 on 100% FiO2 and 10L. suctioned, (+) productive cough. temp 101.4, given 1g IV tylenol and 500cc NS bolus for euvolemia. fever and HR downtrending. LE dopplers negative for dvt  11/12: BD 43, GEORGE ovn, fever and HR downtrending, satting 97% 70% FIO2  11/13: BD 44, GEORGE ovn. started standing tylenol x24 hours for tachycardia. desat to 80s, o2 increased. CXR stable, pending CTA PE protocol.   11/14: BD 45, GEORGE overnight, neuro stable. resp therapy dec FiO2 to 70%.   11/15: BD 46, GEORGE overnight, neuro stable.  Rapid called for desaturation 30s, tachycardic 140s. Patient bagged, 100% fio2, heavily suctioned. CXR/POCUS unremarkable. ABG c/w desaturation. WBC 14.71. Afebrile. O2 improved to 90s and patient upgraded to ICU. ABG paO2 30s improved to 89 on vent. IV Tylenol x 1, sputum sent. Start protonix while on vent.   11/16: BD 47. POCUS showed collapsable IVCF, given 1L bolus. Vanco/Cefpime added empriic for PNA, NGT feeds restarted. MRSA swab neg, Vanc DC'd.   11/17: BD 48. GEORGE overnight. 1l bolus for tachycardia. Spiked to 101, cultured. 500cc bolus for tachycardia, tachy to 148 given 25mcg fentanyl, 250cc albumin, 1.5L bolus. 5 IV lopressor with response HR to 100s. +Stenotrophomonas on sputum cx.   11/18: BD 49. GEORGE overnight. Consulted ID, cefepime switched to bactrim x7days. Started hydrochlorothiazine 12.5mg daily.  11/19: BD 50. Tachy 120s, given tylenol and 500cc NS. Tolerating 5/5, will TCx. Holding phos binder. D/c Bactrim. D/c gabriel, f/u TOV.     Vital Signs Last 24 Hrs  T(C): 36.9 (19 Nov 2024 08:57), Max: 38.3 (18 Nov 2024 15:00)  T(F): 98.5 (19 Nov 2024 08:57), Max: 101 (18 Nov 2024 15:00)  HR: 83 (19 Nov 2024 12:00) (65 - 124)  BP: 133/95 (19 Nov 2024 12:00) (122/74 - 168/99)  BP(mean): 110 (19 Nov 2024 12:00) (91 - 126)  RR: 18 (19 Nov 2024 12:00) (16 - 18)  SpO2: 98% (19 Nov 2024 12:00) (93% - 100%)    Parameters below as of 19 Nov 2024 12:00  Patient On (Oxygen Delivery Method): tracheostomy collar    O2 Concentration (%): 40    I&O's Detail    18 Nov 2024 07:01  -  19 Nov 2024 07:00  --------------------------------------------------------  IN:    Free Water: 550 mL    Nepro with Carb Steady: 1080 mL    Sodium Chloride 0.9% Bolus: 500 mL  Total IN: 2130 mL    OUT:    Indwelling Catheter - Urethral (mL): 2160 mL  Total OUT: 2160 mL    Total NET: -30 mL    19 Nov 2024 07:01  -  19 Nov 2024 12:38  --------------------------------------------------------  IN:    Free Water: 250 mL    Nepro with Carb Steady: 120 mL  Total IN: 370 mL    OUT:    Indwelling Catheter - Urethral (mL): 110 mL  Total OUT: 110 mL    Total NET: 260 mL    I&O's Summary    18 Nov 2024 07:01  -  19 Nov 2024 07:00  --------------------------------------------------------  IN: 2130 mL / OUT: 2160 mL / NET: -30 mL    19 Nov 2024 07:01  -  19 Nov 2024 12:38  --------------------------------------------------------  IN: 370 mL / OUT: 110 mL / NET: 260 mL    PHYSICAL EXAM:  Gen: No acute distress, resting comfortably in bed  Skin: Warm and dry. No rashes or lesions.  HEENT: Head without asymmetry or deformity. PERRL 3mm b/l, vertical EOM, tracts vertically, spontaneously opens eyes, no conjunctival redness, sclera is clear, hearing intact to whisper test bilaterally, nares patent bilaterally.  Lungs: +trach to vent, breathing is non-labored, symmetrical chest wall movement, lung sounds are clear bilaterally without rales, rhonchi, or wheezing.   Heart: Regular rate, S1, and S2 without murmur or gallop  Abd: +PEG. Abdomen is soft, non-distended. Normal bowel sounds on auscultation. No masses, hepatomegaly, or splenomegaly appreciated.  Peripheral vascular: Capillary refill <2 seconds; bilateral radial, posterior tibialis, and dorsalis pedis pulses 2/4.  Neuro: A&Ox0, intermittently follows commands, smiles on command, CN II-XII grossly intact; Strength RUE triceps 4/5, bicep 0/5, hand  3/5; LUE not responsive to noxious stimuli; RLE wiggles toes on command; LLE withdraws from noxious stimuli. Bilateral biceps, brachioradialis, patellar, and reflexes 3+. No response to sharp touch in b/l upper and lower extremities.   Wounds/Drains: +Gabriel with clear yellow urine; PEG and trach sites clean without surrounding erythema or purulent drainage.    TUBES/LINES:  [] CVC  [] A-line  [] Lumbar Drain  [] Ventriculostomy  [X] Gabriel  [] Other    DIET:  [] NPO  [] Mechanical  [X] Tube feeds    LABS:                        9.9    9.71  )-----------( 188      ( 19 Nov 2024 05:30 )             30.8     11-19    141  |  112[H]  |  50[H]  ----------------------------<  146[H]  4.0   |  19[L]  |  1.81[H]    Ca    9.0      19 Nov 2024 05:30  Phos  2.2     11-19  Mg     2.0     11-19    Urinalysis Basic - ( 19 Nov 2024 05:30 )    Color: x / Appearance: x / SG: x / pH: x  Gluc: 146 mg/dL / Ketone: x  / Bili: x / Urobili: x   Blood: x / Protein: x / Nitrite: x   Leuk Esterase: x / RBC: x / WBC x   Sq Epi: x / Non Sq Epi: x / Bacteria: x    CAPILLARY BLOOD GLUCOSE    POCT Blood Glucose.: 108 mg/dL (19 Nov 2024 11:44)  POCT Blood Glucose.: 142 mg/dL (19 Nov 2024 05:39)  POCT Blood Glucose.: 119 mg/dL (18 Nov 2024 23:03)  POCT Blood Glucose.: 119 mg/dL (18 Nov 2024 16:01)    Drug Levels: N/A    CSF Analysis: N/A    Allergies Unknown    Allergy Status Unknown    Intolerances Unknown    MEDICATIONS:  Antibiotics: D/c'd Bactrim morning of 11/19    Neuro:  acetaminophen   Oral Liquid .. 650 milliGRAM(s) Oral every 6 hours PRN    Anticoagulation:  heparin   Injectable 5000 Unit(s) SubCutaneous every 8 hours    OTHER:  acetylcysteine 10%  Inhalation 4 milliLiter(s) Inhalation every 8 hours  albuterol/ipratropium for Nebulization 3 milliLiter(s) Nebulizer every 8 hours  amLODIPine   Tablet 10 milliGRAM(s) Oral daily  artificial tears (preservative free) Ophthalmic Solution 1 Drop(s) Right EYE every 4 hours  chlorhexidine 0.12% Liquid 15 milliLiter(s) Oral Mucosa every 12 hours  doxazosin 8 milliGRAM(s) Oral at bedtime  hydrochlorothiazide 12.5 milliGRAM(s) Oral daily  insulin lispro (ADMELOG) corrective regimen sliding scale   SubCutaneous every 6 hours  pantoprazole   Suspension 40 milliGRAM(s) Oral daily  petrolatum Ophthalmic Ointment 1 Application(s) Both EYES two times a day  polyethylene glycol 3350 17 Gram(s) Oral two times a day  sodium zirconium cyclosilicate 5 Gram(s) Oral daily    IVF: Sodium chloride 0.9% 500ml bolus given at 500ml/hr x 1 hr; Free water 250ml flushes q6hrs    CULTURES:  Culture Results:   No growth at 48 Hours (11-17 @ 05:59)  Culture Results:   Mixed gram negative rods including  Moderate Stenotrophomonas maltophilia  Commensal iram consistent with body site (11-16 @ 01:53)    RADIOLOGY & ADDITIONAL TESTS:  Xray Chest 1 View- PORTABLE-Routine (Xray Chest 1 View- PORTABLE-Routine in AM.) (11.19.24 @ 07:00) >  IMPRESSION:    Similar appearance to prior exam 11/17/2024. No lung consolidation or   acute infiltrate. Mild hypoinflation. No pneumothorax or pleural effusion.    ASSESSMENT:  44y Male PMH of possible stroke/MI present to [facility] after collapsing at work. Decorticate posturing, vomiting, intubated for airway protection. 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 Transferred to [facility] for [facility] management. S/p trach 10/14. S/p peg 10/21. Re-upgrade to ICU 2/2 desaturation event and suctioning requirements 11/15.     AMS    Handoff    MEWS Score    Acute myocardial infarction    CVA (cerebral vascular accident)    Intracerebral hemorrhage of brain stem    Brainstem stroke    Brain stem stroke syndrome    Brain stem hemorrhage    Brain stem stroke syndrome    Hemorrhagic stroke    Brainstem stroke    Encephalopathy acute    Functional quadriplegia    Advanced care planning/counseling discussion    Encounter for palliative care    Pontine hemorrhage    Neurogenic dysphagia    Chronic respiratory failure    Acute kidney injury superimposed on CKD    Acute urinary retention    Hypertensive emergency    Sepsis, unspecified organism    Sepsis    Gram-negative bacteremia    Percutaneous tracheal puncture    Altered mental status examination    EGD, with PEG    AMS    SysAdmin_VisitLink      PLAN:  Neuro:  - neuro q4/vitals q1  - pain control: tylenol prn  - vEEG (10/3-4)- negative, (10/17-10/19) - negative  - CTH 9/30: enlarged pontine hemorrhage, CTH 10/3: stable, CTH 10/25 anisocoric pupils showing mostly resolved pontine hemorrhage, no new hemorrhage or infarct, no herniation  - MRI brain w/ w/o contrast 10/12: parenchymal hemorrhage, acute/subacute R cerebellar stroke    - stroke core measures, stroke neuro signed off    CV:  - SBP goal 100-160  - HTN: amlodipine 10 mg qd, HCTZ 12.5 daily  - echo (9/30) EF 75%    Resp:  - Trach to vent 60/430/5/12, CPAP as tolerated 5/5; plan to transition to trach collar 11/19.   - Secretions: duonebs/mucomyst/chest PT q8h   - CTA chest 11/13 negative for PE, inc ground glass opacities    GI:  - TF via PEG (placed 10/21 by gen surg)  - bowel regimen, last BM 11/17  - CTAP 11/8 negative for infection    - GI ppx: Protonix 40mg daily while on vent     Renal:  - +gabriel replaced 11/7 by urology 2/2 urinary retention; TOV 11/19   - FW flushes 250cc q6hrs   - IVL  - hyperK 10/20: lokelma 5mg qd  - Urinary retenion: Cardura 8 mg   - CKD: trend BUN/Cr  - renal US 10/1: echogenicity c/w chronic med renal dz, repeat 10/8: inc renal echogeicity, c/f medical renal disease w increased hydro     Endo:  - ISS  - A1c 5.4    Heme:  - DVT ppx: SCDs, SQH 5000u q8h   - LE dopplers negative 11/11    ID:  - afebrile as of 11/18 at 18:00; last pancx 11/16  -  s/p Stenotrophomonas maltophilia PNA: s/p Bactrim (11/18-11/19) s/p Cefepime (11/16-11/18)  - MRSA swab neg 11/16   - pan cultured 11/3, (+) sputum for stenotrophomas maltophlia, blood cx (+) klebsiella, cefepime 2gq12 (11/6 - 11/12)   - empiric tx: s/p zosyn (11/3-11/6) , s/p vanc  (11/3- 11/5)  - S/p Ancef (10/4-10/14) for PNA, and s/p Unasyn (10/18-10/23) +actinobacter baumanii    MISC:  - ophtho consult for keratitis, s/p erythromycin ointment to rt eye q4hrs, ofloxacin ointment to rt eye QID, artificial tears to rt eye q2hrs, moisture chamber at bedtime    Dispo: regional, full code, pending placement and emergency medicaid; PT/OT/Speech recs pending trach collar.     Discussed with Dr Iyer

## 2024-11-19 NOTE — SPEECH LANGUAGE PATHOLOGY EVALUATION - SLP PERTINENT HISTORY OF CURRENT PROBLEM
PMH of possible stroke/MI present to [facility] after collapsing at work. Decorticate posturing, vomiting, intubated for airway protection. 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 Transferred to [facility] for further management. S/p trach 10/14. S/p peg 10/21. Re-upgrade to ICU 2/2 desaturation event and suctioning requirements 11/15.

## 2024-11-19 NOTE — SPEAKING VALVE EVALUATION - ADDITIONAL COMMENTS
No signs/symptoms of respiratory distress. No changes in respiratory status with speaking valve use.   No air stacking.

## 2024-11-19 NOTE — PROGRESS NOTE ADULT - ASSESSMENT
----- Message from Dana Guillermo MD sent at 1/19/2021  8:16 AM CST -----  Labs good.  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 Underwent trach and peg as per family wishes and was stable for transfer to Neurosurgery Stepdown on trach collar. On 11/15 he was noted to desat 2/2 mucus plugging increased secretion/respiratory distress. Was upgraded to the NeuroICU for close respiratory monitoring.       NEURO  Neuroimaging reviewed   most recent CT head 10/25 - IMPRESSION: 1.  Expected evolution of a parenchymal hemorrhage in the nabil and subacute infarction in the cerebellum. 2.  No interval rebleeding. 3.  Worsened opacification of the right mastoid.    Plan  Neurochecks Q4  Analgesia prn  Activity: Bedrest    PULM: Acute hypoxemic respiratory failure, s/p tracheostomy, increased secretions/atelectasis c/w PNA?  Chest x-ray 11/15 Tracheostomy. Persistent elevated right hemidiaphragm. Right lower lobe   atelectasis. No pleural effusions.  CT PE study 11/13: No pulmonary embolism through the level of the lobar pulmonary arteries. Evaluation of more distal segmental and subsegmental pulmonary arteries is limited by suboptimal contrast bolus timing. Secretion in trachea and segmental and subsegmental bronchi, most prominent in right lower lobe with associated groundglass opacity and small consolidation/atelectasis in keeping with aspiration.    Plan  Aggressive pulm toilet  Vent: CPAP and wean to trach collar  Pulse-oxymetry monitoring   Antibiotics for PNA       CARDIAC - HTN, episodes of intermittent tachycardia/hemodynamically stable responsive to IV fluids   BP(mean): 108 (17 Nov 2024 07:00) (87 - 108)  EKG ---> 11/11 - Sinus Tachycardia 122 BPM, P-R Interval 144 ms,  ms  EF 75%  Plan  SBP goal 100-160mm Hg  Amlodipine 10  started HCTZ   telemetry monitoring     ENDO - relative hypoglycemia  A1C 5.4  Plan  Blood sugar goals 140-180 mg/dL  hypoglycemia protocol     GI: Mild transaminitis, constipation   S/P PEG    Plan  enteral feeding at goal    q6  PPI while positive pressure ventilation  Bowel regimen  LBM 11/17     RENAL: Acute urinary retention, ANIKA on CKD  Plan  Na goal 135-145  KVO   q6  Vuong for urine retention (replaced 11/7) Continue doxazosin  Continue phosphate binder and Lokelma Monitor lytes    HEME/ONC:    VTE prophylaxis: SCDs, SQH    ID: Gram negative bacteremia; Klebsiella; fever mild leukocytosis now improving c/w PNA Stenotrophomonas   Cultures:   11/10: Sputum commensal, UA negative.  Blood cultures NGTD  11/3: Stenotrophomas  11/8 CT abd/ pelvis; atelectasis. No infectious process in abd or pelvis  MRSA 11/16 negative  Sputum culture 11/16: gram negative rods (prelim)  Procalcitonin 11/16:  0.35     Plan  Started on broad spectrum antibiotics (Vanc + Cefepime) for presumptive PNA on 11/16 pending cultures   MRSA negative ---> d/c Vancomycin  Continue Cefepime ---> pending cultures for 7-day course ---> narrowed to Bactrim  S/P cefepime 11/6- 11/12 for Klebsiella bacteremia  Follow-up cultures    CODE STATUS:  Full Code     DISPOSITION:  NeuroICU       Jazlyn Tomlin MD  Neurocritical Care Attending  Assessment and Plan  45 y/o M with initially admitted on 9/30 with acute large pontine hemorrhage, SAH, brain edema; he has 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 Underwent trach and peg as per family wishes and was stable for transfer to Neurosurgery Stepdown on trach collar. On 11/15 he was noted to desat 2/2 mucus plugging increased secretion/respiratory distress. Was upgraded to the NeuroICU for close respiratory monitoring.       NEURO  Neuroimaging reviewed   most recent CT head 10/25 - IMPRESSION: 1.  Expected evolution of a parenchymal hemorrhage in the nabil and subacute infarction in the cerebellum. 2.  No interval rebleeding. 3.  Worsened opacification of the right mastoid.    Plan  Neurochecks Q4  Analgesia prn  Activity: Bedrest    PULM: Acute hypoxemic respiratory failure, s/p tracheostomy, increased secretions/atelectasis c/w PNA? tolerating trach coallr   Chest x-ray 11/15 Tracheostomy. Persistent elevated right hemidiaphragm. Right lower lobe   atelectasis. No pleural effusions.  CT PE study 11/13: No pulmonary embolism through the level of the lobar pulmonary arteries. Evaluation of more distal segmental and subsegmental pulmonary arteries is limited by suboptimal contrast bolus timing. Secretion in trachea and segmental and subsegmental bronchi, most prominent in right lower lobe with associated groundglass opacity and small consolidation/atelectasis in keeping with aspiration.    Plan  Aggressive pulm toilet  Trach collar as tolerated  Pulse-oxymetry monitoring     CARDIAC - HTN, episodes of intermittent tachycardia/hemodynamically stable responsive to IV fluids   EKG ---> 11/11 - Sinus Tachycardia 122 BPM, P-R Interval 144 ms,  ms  EF 75%  Plan  SBP goal 100-160mm Hg  Amlodipine 10  started HCTZ   telemetry monitoring     ENDO - relative hypoglycemia  A1C 5.4  Plan  Blood sugar goals 140-180 mg/dL  hypoglycemia protocol     GI: Mild transaminitis, constipation   S/P PEG    Plan  enteral feeding at goal    q6  PPI while positive pressure ventilation  Bowel regimen  LBM 11/17     RENAL: Acute urinary retention, ANIKA on CKD  Plan  Na goal 135-145  KVO   q6  Vuong for urine retention (replaced 11/7) Continue doxazosin  Continue phosphate binder and Lokelma Monitor lytes    HEME/ONC:                          9.9    9.71  )-----------( 188      ( 19 Nov 2024 05:30 )             30.8     VTE prophylaxis: SCDs, SQH    ID: Gram negative bacteremia; Klebsiella; fever mild leukocytosis now improving c/w PNA Stenotrophomonas   Cultures:   11/10: Sputum commensal, UA negative.  Blood cultures NGTD  11/3: Stenotrophomas  11/8 CT abd/ pelvis; atelectasis. No infectious process in abd or pelvis  MRSA 11/16 negative  Sputum culture 11/16: gram negative rods (prelim)  Procalcitonin 11/16:  0.35     Plan  Off antibiotics   S/p Vanc + Cefepime 11/16- 19  S/P cefepime 11/6- 11/12 for Klebsiella bacteremia  Follow-up cultures    CODE STATUS:  Full Code     DISPOSITION:  NeuroICU   Consider transfer to Stepdown if tolerates trach collar for 48 hours/and secretions are minimal    Jazlyn Tomlin MD  Neurocritical Care Attending

## 2024-11-19 NOTE — PROGRESS NOTE ADULT - SUBJECTIVE AND OBJECTIVE BOX
Neurocritical Care Attending Note    HPI per chart review  -  45y/o M  unknown past medical history (reported stroke and MI by coworkers) presented to [facility] with AMS, Pt was working at [employer] when he was found down by coworkers. EMS called and pt brought to [facility] ED. Intubated, sedated, started on cardene for SBPs in 200s. CT head showed brain stem bleed. Transferred to NSICU for further management.  (30 Sep 2024 12:55)  ICH Score 3    Hospital Course/Interval Events  9/30: transferred from [facility]. A line placed. Versed dc'd. Cy Rader at bedside, states pt has family in [place], cannot confirm medications or PMH other than stroke and MI. 250cc bolus 3% given. LR switched to NS. hydralazine 25q8 started, 3% started, switched propofol to precedex   10/1: stability CTH done. Added labetalol, started TF. Palliative consulted. ethics consulted to determine surrogate. febrile 103, pan cx sent  10/2: BD 2, GEORGE overnight. TF resumed. Desatt'd to 80s, FiO2 inc. to 50. Fentanyl given, ABG, CXR ordered. Maxxed on precedex, started on propofol for DARIEN -4 - -5. Precedex dc'd. Duonebs, mucomyst, hypertonic added. 3% dc'd. Cardene dc'd. Start vanc/CTX. Increased labetalol 200q8. MRSA negative, dc'd vanc. ETT pulled back 2cm x 2, good positioning after confirmatory chest xray. Ethics attempting to establish HCP with family. Na 159, starting FW 250q6 for range 150-155.   10/3: BD3, GEORGE o/n, neuro stable. Na elevating, FW increased to 300q6. Dc'd bowel reg for diarrhea. vEEG started. SQH 5000q8 tonight.   10/4: BD 4, albumn bolus, incr. LR to 80 2/2 incr. in Cr, LR to 100cc/hr for uptrending Cr. Started 7% hypersal for 48hrs and SL atropine for inline/oral thick secretions. Dc'd CTX and started ancef for MSSA in the sputum. Nephrology consulted for CKD, f/u recs. SBP 170s, given hydralazine 10mg IVP.   10/5: BD5, o/n 10mg IVP hydralazine given for SBP 170s and started on hydralazine 25q8 via OGT. 10mg IV push labetalol for SBP > 160s. RT placed for diarrhea.   10/6: BD6, o/n FW increased to 350q4 per nephrology recs. IV tylenol for temp 100.6, SBp 160s presumed uncomfortable.   10/7: BD7, overnight pancultured for temp 101.8F.   10/8: BD8. GEORGE. Cr bumped. decreased LR to 75cc/hr. Adding simethicone ATC. incr hydralazine 50mgTID. Incr labetalol 300mgTID. Na 145, decreased FWF to 250q6. Start precedex. FENa consistent with intrinsic kidney injury. Pend repeat renal US. Retaining up to 1.3L, bladder scans q6, straight cath PRN  10/9: BD 9. GEORGE overnight. Neuro stable. abd xray for distention w non-specific gas pattern, OGT to LIWS for morning. duonebs/mucomyst to q8 for improving secretions. Changed tube feeds to Jevity 1.5 20cc/hr, low rate due to abdominal distention, nepro dense and more difficult to digest. Tolerating CPAP, confirmed by ABG.   10/10: BD 10. GEORGE overnight. Neuro stable. (+) gabriel for urinary retention on bladder scan. inc TF to goal rate of 40cc/hr. family leaning toward pursuing trach/PEG. 1/2 amp for FS 81.   10/11: BD 11. GEORGE overnight. Neuro stable. Trach/PEG consults placed.   10/12: BD 12. GEORGE overnight. Neuro stable. MRI brain complete.   10/13: BD 13. Increase flomax. Hold SQH after PM dose for trach tm. IVL.   10/14: BD 14. GEORGE overnight, remains on AC/VC. Gabriel placed for urinary retention. Dc'd free water.  S/p trach with pulm. NGT placed and CXR confirmed in good position.   10/15: BD 15, GEORGE ovn. resumed feeds. spiked 101, pan cx sent.   10/16: BD 16. GEORGE ovn. Lokelma 5mg for K+ 5.4. Started vanc q 24/zosyn for empiric PNA coverage, IVF to 100/hr. PEG held for fever.   10/17: BD 17,  ordered serum osm and urine osm for am. Started sinemet for neurostimulation. Increased cardura to 0.8. Started FW 100q4, dc'd IVF. MRSA negative, dc'd vanc. NGT replaced d/t coiling.   10/18: BD 18, GOERGE overnight, neuro stable. Amantadine added for neurostim. zosyn changed to unasyn for acinetobacter baumannii, failed TOV and required SC  10/19: BD 19, GEORGE ovn. cardura 2mg added for retention. labetalol decreased 200q8, hydralazine decreased 25q8. Gabriel replaced.   10/20: BD20, GEORGE overnight. NGT dislodged, replaced. PEG tomorrow w/ gen surg, FW increased to 150q4 and labetalol decreased to 100q8, lokelma given for hyperkalemia.   10/21: BD 21. POD0 PEG placement with Gen surg. decr labetolol to 50q8, incr. cardura to 0.4, started lokelma and phoslo, dc gabriel POD0 PEG placement with Gen surg.  10/22: BD 22. Plan to start TF today via PEG. dc labetalol, Following ophtho recs. Increased apnea settings - found to be in cheyne-moe respiration. CPAP 5/5.  10/23: BD 23. hydralazine d/c'd, trach collar trial today. Rectal tube placed at 6am.  10/24: BD24, o/n lokelma held due to diarrhea. Free water 100q6 resumed. dc'd tamsulosin, amantadine. Incr'd cardura to 8mg qhs. Dc'd FW. Switched jevity to nepro. gabriel placed for high urine output. Started SL atropine for oral secretions. Dc'd free water.  10/25: BD25, o/n decreased suctioning requirements to > q4hrs, GEORGE. Cr improving, cont phoslo, lokelma held at this time. Gabriel placed yest, cont. Tolerating trach collar. Given 500cc plasmalyte bolus for ANIKA. Dc'd sinemet.   10/26: BD26, o/n resumed lokelma 5mg daily and resumed 100cc free water q6hrs. Change in neuro status with new right pupillary dilation with anisocoria (right pupil 6mm fixed and left pupil 3mm briskly reactive). Given 23.4% NaCl bullet, taken for emergent CTH showing mostly resolved pontine hemorrhage, continued brainstem hypodensity likely edema d/t hemorrhage, no new hemorrhage or infarct, no herniation, mild increase in size of left lateral ventricle. Vitals remaine stable. Na goal > 140.   10/27: BD27, o/n GEORGE.Neuro stable. Pend stepdown with airway bed.   10/28: BD 28. GEORGE overnight. Neuro stable. Miralax ordered. Gabriel removed, pending TOV.  10/29: BD 29. GEORGE o/n. Given 2L NS over 8 hrs for increased BUN/Cr ratio. Gabriel placed for frequent straight cath.   10/30: BD 30.   10/31: BD 31. GEORGE overnight. Na 149, increased free water to 200q6. 1L NS for uptrending BUN.   11/1: BD 32. GEORGE overnight. Given 1L NS for dehydration. Na 146, increased FW to 250q6.   11/2: BD 33 GEORGE overnight, neuro stable, given 500cc bolus for net negative status and tachycardia   11/3: BD 34, GEORGE overnight, neuro stable. Patient remains tachycardic, EKG showing sinus tachycardia, given additional 500cc NS bolus. Febrile to 101.9F, pan cultured (without UA), CXR WNL, given tylenol.   11/4: BD 35, GEORGE overnight, neuro stable. Given 1L NS for tachycardia. sputum (+) for stenotropohomas maltophilia.   11/5: BD 36 GEORGE overnight, neuro stable. Vancomycin dc'd. Chest PT BID. ID consulted, cont zosyn.  11/6: BD 37. blood cx + klebsiella dc zosyn changed to cefepime, CTAP ordered, rpt blood cx sent.    11/7: BD 38. Pending CT A/P, given 250cc bolus and starting maintenance fluids overnight. Pending CT A/P after bolus   11/8: BD 39. CT CAP negative for infection.   11/9: BD 40. GEORGE overnight.  11/10: BD 41. GEORGE overnight. desat to 85 on trach collar, O2 inc to 10L and 100%, O2 sat inc to 95. pt tachy to 110s, euvolemic. given tylenol. ABG and CXR ordered. spiked fever, pancultured, RVP negative. AM ABG w pO2 79, rpt w pO2 79. pt appears comfortable, satting 94%.   11/11: BD 42. GEORGE overnight. pt became tachy to 130s, desat to 90 on 100% FiO2 and 10L. suctioned, (+) productive cough. temp 101.4, given 1g IV tylenol and 500cc NS bolus for euvolemia. fever and HR downtrending. LE dopplers negative for dvt  11/12: BD 43, GEORGE ovn, fever and HR downtrending, satting 97% 70% FIO2  11/13: BD 44, GEORGE ovn. started standing tylenol x24 hours for tachycardia. desat to 80s, o2 increased. CXR stable, pending CTA PE protocol.   11/14: BD 45, GEORGE overnight, neuro stable. resp therapy dec FiO2 to 70%.   11/15: BD 46, GEORGE overnight, neuro stable.  Rapid called for desaturation 30s, tachycardic 140s. Patient bagged, 100% fio2, heavily suctioned. CXR/POCUS unremarkable. ABG c/w desaturation. WBC 14.71. Afebrile. O2 improved to 90s and patient upgraded to ICU. ABG paO2 30s improved to 89 on vent. IV Tylenol x 1, sputum sent. Start protonix while on vent. .   11/16: BD 47. POCUS showed collapsable IVCF, given 1L bolus. Vanco/Cefpime added empriic for PNA, NGT feeds restarted. MRSA swab neg, Vanc DC'd.   11/17: BD 48. GEORGE overnight. 1l bolus for tachycardia. Spiked to 101, cultured. 500cc bolus for tachycardia, tachy to 148 given 25mcg fentanyl, 250cc albumin, 1.5L bolus. 5 IV lopressor with response HR to 100s. +Stenotrophomonas on sputum cx.   11/18: BD 49. GEORGE overnight. Consulted ID, cefepime switched to bactrim x7days. Started hydrochlorothiazine 12.5mg daily.  11/19: BD 50. Tachy 120s, given tylenol and 500cc NS. Tolerating 5/5, switched to TCx. Holding phos binder. D/c Bactrim. D/c gabriel, f/u TOV.     MEDICATIONS  (STANDING):  acetylcysteine 10%  Inhalation 4 milliLiter(s) Inhalation every 8 hours  albuterol/ipratropium for Nebulization 3 milliLiter(s) Nebulizer every 8 hours  amLODIPine   Tablet 10 milliGRAM(s) Oral daily  artificial tears (preservative free) Ophthalmic Solution 1 Drop(s) Right EYE every 4 hours  chlorhexidine 0.12% Liquid 15 milliLiter(s) Oral Mucosa every 12 hours  doxazosin 8 milliGRAM(s) Oral at bedtime  heparin   Injectable 5000 Unit(s) SubCutaneous every 8 hours  hydrochlorothiazide 12.5 milliGRAM(s) Oral daily  insulin lispro (ADMELOG) corrective regimen sliding scale   SubCutaneous every 6 hours  pantoprazole   Suspension 40 milliGRAM(s) Oral daily  petrolatum Ophthalmic Ointment 1 Application(s) Both EYES two times a day  polyethylene glycol 3350 17 Gram(s) Oral two times a day  sodium zirconium cyclosilicate 5 Gram(s) Oral daily    MEDICATIONS  (PRN):  acetaminophen   Oral Liquid .. 650 milliGRAM(s) Oral every 6 hours PRN Temp greater or equal to 38C (100.4F), Mild Pain (1 - 3)      Physical Exam  ICU Vital Signs Last 24 Hrs  T(C): 36.3 (19 Nov 2024 17:00), Max: 37.6 (19 Nov 2024 04:00)  T(F): 97.3 (19 Nov 2024 17:00), Max: 99.7 (19 Nov 2024 04:00)  HR: 79 (19 Nov 2024 17:00) (70 - 124)  BP: 142/101 (19 Nov 2024 17:00) (122/74 - 168/99)  BP(mean): 118 (19 Nov 2024 17:00) (91 - 126)  ABP: --  ABP(mean): --  RR: 16 (19 Nov 2024 17:00) (16 - 18)  SpO2: 98% (19 Nov 2024 17:00) (93% - 100%)      GENERAL: Calm, lying in bed, on trach collar  EENT: Anicteric   CARDIOVASCULAR: S1/S2, RRR  PULMONARY: Diminished, copious secretions  ABDOMEN: soft, nontender with normoactive bowel sounds  EXTREMITIES: no edema  SKIN: no rash  Neurological Exam   Mental Status: Awake, eyes spontaneously open, tracks and attends briefly, following 1 step command inconsistently (sticking tongue out)  CNs: pupils left >> right reactive to light right sluggish, left facial weakness,   Motor: left upper and lower extremity trace extension to noxious, right upper and lower trace withdrawal to noxious    Neurocritical Care Attending Note    HPI per chart review  -  45y/o M  unknown past medical history (reported stroke and MI by coworkers) presented to [facility] with AMS, Pt was working at [employer] when he was found down by coworkers. EMS called and pt brought to [facility] ED. Intubated, sedated, started on cardene for SBPs in 200s. CT head showed brain stem bleed. Transferred to NSICU for further management.  (30 Sep 2024 12:55)  ICH Score 3    Hospital Course/Interval Events  9/30: transferred from [facility]. A line placed. Versed dc'd. Cy Rader at bedside, states pt has family in [place], cannot confirm medications or PMH other than stroke and MI. 250cc bolus 3% given. LR switched to NS. hydralazine 25q8 started, 3% started, switched propofol to precedex   10/1: stability CTH done. Added labetalol, started TF. Palliative consulted. ethics consulted to determine surrogate. febrile 103, pan cx sent  10/2: BD 2, GEORGE overnight. TF resumed. Desatt'd to 80s, FiO2 inc. to 50. Fentanyl given, ABG, CXR ordered. Maxxed on precedex, started on propofol for DARIEN -4 - -5. Precedex dc'd. Duonebs, mucomyst, hypertonic added. 3% dc'd. Cardene dc'd. Start vanc/CTX. Increased labetalol 200q8. MRSA negative, dc'd vanc. ETT pulled back 2cm x 2, good positioning after confirmatory chest xray. Ethics attempting to establish HCP with family. Na 159, starting FW 250q6 for range 150-155.   10/3: BD3, GEORGE o/n, neuro stable. Na elevating, FW increased to 300q6. Dc'd bowel reg for diarrhea. vEEG started. SQH 5000q8 tonight.   10/4: BD 4, albumn bolus, incr. LR to 80 2/2 incr. in Cr, LR to 100cc/hr for uptrending Cr. Started 7% hypersal for 48hrs and SL atropine for inline/oral thick secretions. Dc'd CTX and started ancef for MSSA in the sputum. Nephrology consulted for CKD, f/u recs. SBP 170s, given hydralazine 10mg IVP.   10/5: BD5, o/n 10mg IVP hydralazine given for SBP 170s and started on hydralazine 25q8 via OGT. 10mg IV push labetalol for SBP > 160s. RT placed for diarrhea.   10/6: BD6, o/n FW increased to 350q4 per nephrology recs. IV tylenol for temp 100.6, SBp 160s presumed uncomfortable.   10/7: BD7, overnight pancultured for temp 101.8F.   10/8: BD8. GEORGE. Cr bumped. decreased LR to 75cc/hr. Adding simethicone ATC. incr hydralazine 50mgTID. Incr labetalol 300mgTID. Na 145, decreased FWF to 250q6. Start precedex. FENa consistent with intrinsic kidney injury. Pend repeat renal US. Retaining up to 1.3L, bladder scans q6, straight cath PRN  10/9: BD 9. GEORGE overnight. Neuro stable. abd xray for distention w non-specific gas pattern, OGT to LIWS for morning. duonebs/mucomyst to q8 for improving secretions. Changed tube feeds to Jevity 1.5 20cc/hr, low rate due to abdominal distention, nepro dense and more difficult to digest. Tolerating CPAP, confirmed by ABG.   10/10: BD 10. GEORGE overnight. Neuro stable. (+) gabriel for urinary retention on bladder scan. inc TF to goal rate of 40cc/hr. family leaning toward pursuing trach/PEG. 1/2 amp for FS 81.   10/11: BD 11. GEORGE overnight. Neuro stable. Trach/PEG consults placed.   10/12: BD 12. GEORGE overnight. Neuro stable. MRI brain complete.   10/13: BD 13. Increase flomax. Hold SQH after PM dose for trach tm. IVL.   10/14: BD 14. GEORGE overnight, remains on AC/VC. Gabriel placed for urinary retention. Dc'd free water.  S/p trach with pulm. NGT placed and CXR confirmed in good position.   10/15: BD 15, GEORGE ovn. resumed feeds. spiked 101, pan cx sent.   10/16: BD 16. GEORGE ovn. Lokelma 5mg for K+ 5.4. Started vanc q 24/zosyn for empiric PNA coverage, IVF to 100/hr. PEG held for fever.   10/17: BD 17,  ordered serum osm and urine osm for am. Started sinemet for neurostimulation. Increased cardura to 0.8. Started FW 100q4, dc'd IVF. MRSA negative, dc'd vanc. NGT replaced d/t coiling.   10/18: BD 18, GEORGE overnight, neuro stable. Amantadine added for neurostim. zosyn changed to unasyn for acinetobacter baumannii, failed TOV and required SC  10/19: BD 19, GEORGE ovn. cardura 2mg added for retention. labetalol decreased 200q8, hydralazine decreased 25q8. Gabriel replaced.   10/20: BD20, GEORGE overnight. NGT dislodged, replaced. PEG tomorrow w/ gen surg, FW increased to 150q4 and labetalol decreased to 100q8, lokelma given for hyperkalemia.   10/21: BD 21. POD0 PEG placement with Gen surg. decr labetolol to 50q8, incr. cardura to 0.4, started lokelma and phoslo, dc gabriel POD0 PEG placement with Gen surg.  10/22: BD 22. Plan to start TF today via PEG. dc labetalol, Following ophtho recs. Increased apnea settings - found to be in cheyne-moe respiration. CPAP 5/5.  10/23: BD 23. hydralazine d/c'd, trach collar trial today. Rectal tube placed at 6am.  10/24: BD24, o/n lokelma held due to diarrhea. Free water 100q6 resumed. dc'd tamsulosin, amantadine. Incr'd cardura to 8mg qhs. Dc'd FW. Switched jevity to nepro. gabriel placed for high urine output. Started SL atropine for oral secretions. Dc'd free water.  10/25: BD25, o/n decreased suctioning requirements to > q4hrs, GEORGE. Cr improving, cont phoslo, lokelma held at this time. Gabriel placed yest, cont. Tolerating trach collar. Given 500cc plasmalyte bolus for ANIKA. Dc'd sinemet.   10/26: BD26, o/n resumed lokelma 5mg daily and resumed 100cc free water q6hrs. Change in neuro status with new right pupillary dilation with anisocoria (right pupil 6mm fixed and left pupil 3mm briskly reactive). Given 23.4% NaCl bullet, taken for emergent CTH showing mostly resolved pontine hemorrhage, continued brainstem hypodensity likely edema d/t hemorrhage, no new hemorrhage or infarct, no herniation, mild increase in size of left lateral ventricle. Vitals remaine stable. Na goal > 140.   10/27: BD27, o/n GEORGE.Neuro stable. Pend stepdown with airway bed.   10/28: BD 28. GEORGE overnight. Neuro stable. Miralax ordered. Gabriel removed, pending TOV.  10/29: BD 29. GEORGE o/n. Given 2L NS over 8 hrs for increased BUN/Cr ratio. Gabriel placed for frequent straight cath.   10/30: BD 30.   10/31: BD 31. GEORGE overnight. Na 149, increased free water to 200q6. 1L NS for uptrending BUN.   11/1: BD 32. GEORGE overnight. Given 1L NS for dehydration. Na 146, increased FW to 250q6.   11/2: BD 33 GEORGE overnight, neuro stable, given 500cc bolus for net negative status and tachycardia   11/3: BD 34, GEORGE overnight, neuro stable. Patient remains tachycardic, EKG showing sinus tachycardia, given additional 500cc NS bolus. Febrile to 101.9F, pan cultured (without UA), CXR WNL, given tylenol.   11/4: BD 35, GEORGE overnight, neuro stable. Given 1L NS for tachycardia. sputum (+) for stenotropohomas maltophilia.   11/5: BD 36 GEORGE overnight, neuro stable. Vancomycin dc'd. Chest PT BID. ID consulted, cont zosyn.  11/6: BD 37. blood cx + klebsiella dc zosyn changed to cefepime, CTAP ordered, rpt blood cx sent.    11/7: BD 38. Pending CT A/P, given 250cc bolus and starting maintenance fluids overnight. Pending CT A/P after bolus   11/8: BD 39. CT CAP negative for infection.   11/9: BD 40. GEORGE overnight.  11/10: BD 41. GEORGE overnight. desat to 85 on trach collar, O2 inc to 10L and 100%, O2 sat inc to 95. pt tachy to 110s, euvolemic. given tylenol. ABG and CXR ordered. spiked fever, pancultured, RVP negative. AM ABG w pO2 79, rpt w pO2 79. pt appears comfortable, satting 94%.   11/11: BD 42. GEORGE overnight. pt became tachy to 130s, desat to 90 on 100% FiO2 and 10L. suctioned, (+) productive cough. temp 101.4, given 1g IV tylenol and 500cc NS bolus for euvolemia. fever and HR downtrending. LE dopplers negative for dvt  11/12: BD 43, GEORGE ovn, fever and HR downtrending, satting 97% 70% FIO2  11/13: BD 44, GEORGE ovn. started standing tylenol x24 hours for tachycardia. desat to 80s, o2 increased. CXR stable, pending CTA PE protocol.   11/14: BD 45, GEORGE overnight, neuro stable. resp therapy dec FiO2 to 70%.   11/15: BD 46, GEORGE overnight, neuro stable.  Rapid called for desaturation 30s, tachycardic 140s. Patient bagged, 100% fio2, heavily suctioned. CXR/POCUS unremarkable. ABG c/w desaturation. WBC 14.71. Afebrile. O2 improved to 90s and patient upgraded to ICU. ABG paO2 30s improved to 89 on vent. IV Tylenol x 1, sputum sent. Start protonix while on vent. .   11/16: BD 47. POCUS showed collapsable IVCF, given 1L bolus. Vanco/Cefpime added empriic for PNA, NGT feeds restarted. MRSA swab neg, Vanc DC'd.   11/17: BD 48. GEORGE overnight. 1l bolus for tachycardia. Spiked to 101, cultured. 500cc bolus for tachycardia, tachy to 148 given 25mcg fentanyl, 250cc albumin, 1.5L bolus. 5 IV lopressor with response HR to 100s. +Stenotrophomonas on sputum cx.   11/18: BD 49. GEORGE overnight. Consulted ID, cefepime switched to bactrim x7days. Started hydrochlorothiazine 12.5mg daily.  11/19: BD 50. Tachy 120s, given tylenol and 500cc NS. Tolerating 5/5, switched to TCx. Holding phos binder. D/c Bactrim. D/c gabriel, f/u TOV.     MEDICATIONS  (STANDING):  acetylcysteine 10%  Inhalation 4 milliLiter(s) Inhalation every 8 hours  albuterol/ipratropium for Nebulization 3 milliLiter(s) Nebulizer every 8 hours  amLODIPine   Tablet 10 milliGRAM(s) Oral daily  artificial tears (preservative free) Ophthalmic Solution 1 Drop(s) Right EYE every 4 hours  chlorhexidine 0.12% Liquid 15 milliLiter(s) Oral Mucosa every 12 hours  doxazosin 8 milliGRAM(s) Oral at bedtime  heparin   Injectable 5000 Unit(s) SubCutaneous every 8 hours  hydrochlorothiazide 12.5 milliGRAM(s) Oral daily  insulin lispro (ADMELOG) corrective regimen sliding scale   SubCutaneous every 6 hours  pantoprazole   Suspension 40 milliGRAM(s) Oral daily  petrolatum Ophthalmic Ointment 1 Application(s) Both EYES two times a day  polyethylene glycol 3350 17 Gram(s) Oral two times a day  sodium zirconium cyclosilicate 5 Gram(s) Oral daily    MEDICATIONS  (PRN):  acetaminophen   Oral Liquid .. 650 milliGRAM(s) Oral every 6 hours PRN Temp greater or equal to 38C (100.4F), Mild Pain (1 - 3)      Physical Exam  ICU Vital Signs Last 24 Hrs  T(C): 36.3 (19 Nov 2024 17:00), Max: 37.6 (19 Nov 2024 04:00)  T(F): 97.3 (19 Nov 2024 17:00), Max: 99.7 (19 Nov 2024 04:00)  HR: 79 (19 Nov 2024 17:00) (70 - 124)  BP: 142/101 (19 Nov 2024 17:00) (122/74 - 168/99)  BP(mean): 118 (19 Nov 2024 17:00) (91 - 126)  RR: 16 (19 Nov 2024 17:00) (16 - 18)  SpO2: 98% (19 Nov 2024 17:00) (93% - 100%)    GENERAL: Calm, lying in bed, on trach collar  EENT: Anicteric   CARDIOVASCULAR: S1/S2, RRR  PULMONARY: Diminished, copious secretions  ABDOMEN: soft, nontender with normoactive bowel sounds  EXTREMITIES: no edema  SKIN: no rash  Neurological Exam   Mental Status: Awake, eyes spontaneously open, tracks and attends briefly, following 1 step command inconsistently (sticking tongue out)  CNs: pupils left >> right reactive to light right sluggish, left facial weakness,   Motor: left upper and lower extremity trace extension to noxious, right upper and lower trace withdrawal to noxious

## 2024-11-19 NOTE — PHYSICAL THERAPY INITIAL EVALUATION ADULT - MANUAL MUSCLE TESTING RESULTS, REHAB EVAL
UE MMT: R shoulder 0/5, R elbow with spontaneous 4/5 extension no spontaneous/volitional R elbow flexion, R hand 3/5 (inconsistent squeeze to command); L shoulder 0/5, L elbow 0/5, L hand 1/5; R hip 0/5, R knee ext 1/5, R ankle 2-/5; L hip flex 2-/5, hip internal rotation 2-/5, L knee 1/5, ankle 1/5

## 2024-11-19 NOTE — OCCUPATIONAL THERAPY INITIAL EVALUATION ADULT - PERTINENT HX OF CURRENT PROBLEM, REHAB EVAL
43y/o M  unknown past medical history (reported stroke and MI by coworkers) presented to [facility] with AMS, Pt was working at [employer] when he was found down by coworkers. EMS called and pt brought to [facility] ED. Intubated, sedated, started on cardene for SBPs in 200s. CT head showed brain stem bleed. Transferred to NSICU for further management

## 2024-11-19 NOTE — SPEAKING VALVE EVALUATION - SLP GENERAL OBSERVATIONS
Pt was seen fully awake and alert, HOB fully elevated, on O2 via trach collar (PORTEX 7.5, cuff fully deflated overnight).

## 2024-11-19 NOTE — SPEAKING VALVE EVALUATION - DIAGNOSTIC IMPRESSIONS
Aphonia s/p tracheostomy. No vocalizations made at this time. Patient tolerated speaking valve from a respiratory standpoint. PMV benefits for secretion management and improving upper airway sensation as well cough strength/effectiveness. This service to continue to follow for PMV use and optimizing communication.

## 2024-11-19 NOTE — SPEAKING VALVE EVALUATION - RECOMMENDED SPEAKING VALVE GUIDELINES
Placement of speaking valve as tolerated/During waking hours only/Cuff fully deflated/Level of supervision

## 2024-11-19 NOTE — PHYSICAL THERAPY INITIAL EVALUATION ADULT - IMPAIRMENTS FOUND, PT EVAL
aerobic capacity/endurance/arousal, attention, and cognition/cognitive impairment/cranial and peripheral nerve integrity/decreased midline orientation/gait, locomotion, and balance/muscle strength/posture/ROM

## 2024-11-19 NOTE — PHYSICAL THERAPY INITIAL EVALUATION ADULT - SITTING BALANCE: STATIC
Pt dangled EOB ~30 minutes requiring max A for trunk/head control in sitting due to strong posterior lean. Pt demonstrates significant forward head posture and requires max A to maintain cervical extension against gravity/poor minus

## 2024-11-19 NOTE — PHYSICAL THERAPY INITIAL EVALUATION ADULT - IMPAIRMENTS CONTRIBUTING IMPAIRED BED MOBILITY, REHAB EVAL
cognition/impaired motor control/abnormal muscle tone/impaired postural control/decreased ROM/decreased strength

## 2024-11-19 NOTE — PHYSICAL THERAPY INITIAL EVALUATION ADULT - ADDITIONAL COMMENTS
Pt trached and cognitively impaired, information regarding social history and PLOF obtained from EMR. Pt likely independent with functional mobility and ADLs/IADLs prior to admission. He was working at a [employer] when found down by co-workers.

## 2024-11-19 NOTE — SPEECH LANGUAGE PATHOLOGY EVALUATION - COMMENTS
INCOMPLETE DNT Utilize basic yes/no questions - pt squeezes right hand consistently for "yes" given yes/no questions. Allow for pauses in between.   Will need to further assess unable to assess squeeze R hand for yes/no questions

## 2024-11-19 NOTE — OCCUPATIONAL THERAPY INITIAL EVALUATION ADULT - MODALITIES TREATMENT COMMENTS
Unable to perform formal cranial nerve assessment 2/2 impaired cognition and motor control. Pt noted to have L faical droop at rest, pt able to open mouth with increased time/effort on commands, unable to protrude tongue. Pt able to spontaneously shrug left shoulder (L shoulder noted to have 1 finger sublux, R shoulder with 1/2 finger sublux). Pt with poor cervical control in semi-supine and in sitting, pt able to perform cervical rotation in semi-supine and in sitting however unable to perform extension independently, required maxA in sitting to maintain neck in neutral. See vision section for details on eye movement/motor control. Unable to perform formal cranial nerve assessment 2/2 impaired cognition and motor control. Pt noted to have L faical droop at rest, pt able to open mouth with increased time/effort on commands, unable to protrude tongue. Pt able to spontaneously shrug left shoulder (L shoulder noted to have 1 finger anterior sublux, R shoulder with 1/2 finger ant/posterior sublux). Pt with poor cervical control in semi-supine and in sitting, pt able to perform cervical rotation in semi-supine and in sitting however unable to perform extension independently, required maxA in sitting to maintain neck in neutral. See vision section for details on eye movement/motor control.

## 2024-11-19 NOTE — OCCUPATIONAL THERAPY INITIAL EVALUATION ADULT - VISUAL ACUITY
Pt able to track in the vertical field both superiorly and inferiorly however pt unable to track horizontally, pt noted to have vertical jercking at rest, pt able to closed L eye on command when sitting EOB however R eye unable to close. Pt able to fixate and maintain gaze on object within visual field.

## 2024-11-19 NOTE — OCCUPATIONAL THERAPY INITIAL EVALUATION ADULT - ADDITIONAL COMMENTS
Unable to obtain history from pt 2/2 impaired cognition and pt nonverbal throughout session, per chart pt was working in a [employer] prior to admission and likely indep with ADLs/ functional mobility.

## 2024-11-19 NOTE — OCCUPATIONAL THERAPY INITIAL EVALUATION ADULT - LEVEL OF INDEPENDENCE: GROOMING, OT EVAL
maximum assist (25% patients effort) pt required maxA to wash face in sitting with proximal support at the elbow, pt was dependent for oral care./maximum assist (25% patients effort)

## 2024-11-19 NOTE — SPEECH LANGUAGE PATHOLOGY EVALUATION - SLP DIAGNOSIS
Assessment limited to nonverbal gestures for basic yes/no questions. Pt with 90% accuracy utilizing R hand squeeze to indicate "yes." Pt inconsistently turned head and opened mouth upon request. Unable to assess motor speech and voicing. No verbal communicative attempts. PMV assessment completed as well- please refer to report. Will continue to follow for communication.

## 2024-11-19 NOTE — OCCUPATIONAL THERAPY INITIAL EVALUATION ADULT - GENERAL OBSERVATIONS, REHAB EVAL
Pt received semi-supine in bed +heplock +PEG (disconnected prior to session) +tele +[place] (disconnected during session, MISTY Don aware) +SCDs +b/l CAIR boots +trach collar fio2 40+cooling pad +rectal probe, in NAD and agreeable to OT. OT Gaye and PT Deya present t/o. Cleared by MISTY Don and MD Iyer to see pt.

## 2024-11-19 NOTE — OCCUPATIONAL THERAPY INITIAL EVALUATION ADULT - RANGE OF MOTION EXAMINATION, UPPER EXTREMITY
Pt with pain response to passive movement/bilateral UE Active Assistive ROM was WFL  (within functional limits)

## 2024-11-19 NOTE — SPEAKING VALVE EVALUATION - REMOVE VALVE FOR
Increased work of breathing/Evidence of air trapping/Excessive coughing/Diaphoresis/Sleep/Subjective complaints/Aerosolized respiratory treatments

## 2024-11-19 NOTE — PHYSICAL THERAPY INITIAL EVALUATION ADULT - GENERAL OBSERVATIONS, REHAB EVAL
MRS 5. Pt received semi supine in bed in NAD, +trach collar (FiO2 40%), +tele, +IV, +PEG (feeds held for mobility), +b/l SCDs, +b/l CAIR boots, +[place] cath (dislodged during PT/OT session), +rectal probe, +cooling pad, +OT Yue present, +OT Gaye present, MISTY Don and MD Iyer cleared Pt for PT/OT evaluation.

## 2024-11-19 NOTE — PHYSICAL THERAPY INITIAL EVALUATION ADULT - PERTINENT HX OF CURRENT PROBLEM, REHAB EVAL
43y/o M  unknown past medical history (reported stroke and MI by coworkers) presented to [facility] with AMS, Pt was working at [employer] when he was found down by coworkers. EMS called and pt brought to [facility] ED. Intubated, sedated, started on cardene for SBPs in 200s. CT head showed brain stem bleed. Transferred to NSICU for further management.

## 2024-11-20 LAB
ANION GAP SERPL CALC-SCNC: 11 MMOL/L — SIGNIFICANT CHANGE UP (ref 5–17)
BASOPHILS # BLD AUTO: 0.05 K/UL — SIGNIFICANT CHANGE UP (ref 0–0.2)
BASOPHILS NFR BLD AUTO: 0.7 % — SIGNIFICANT CHANGE UP (ref 0–2)
BUN SERPL-MCNC: 51 MG/DL — HIGH (ref 7–23)
CALCIUM SERPL-MCNC: 9.4 MG/DL — SIGNIFICANT CHANGE UP (ref 8.4–10.5)
CHLORIDE SERPL-SCNC: 105 MMOL/L — SIGNIFICANT CHANGE UP (ref 96–108)
CO2 SERPL-SCNC: 21 MMOL/L — LOW (ref 22–31)
CREAT SERPL-MCNC: 2 MG/DL — HIGH (ref 0.5–1.3)
EGFR: 41 ML/MIN/1.73M2 — LOW
EGFR: 41 ML/MIN/1.73M2 — LOW
EOSINOPHIL # BLD AUTO: 0.36 K/UL — SIGNIFICANT CHANGE UP (ref 0–0.5)
EOSINOPHIL NFR BLD AUTO: 5.3 % — SIGNIFICANT CHANGE UP (ref 0–6)
GLUCOSE SERPL-MCNC: 105 MG/DL — HIGH (ref 70–99)
HCT VFR BLD CALC: 33.7 % — LOW (ref 39–50)
HGB BLD-MCNC: 10.6 G/DL — LOW (ref 13–17)
IMM GRANULOCYTES NFR BLD AUTO: 0.3 % — SIGNIFICANT CHANGE UP (ref 0–0.9)
LYMPHOCYTES # BLD AUTO: 1.23 K/UL — SIGNIFICANT CHANGE UP (ref 1–3.3)
LYMPHOCYTES # BLD AUTO: 18.1 % — SIGNIFICANT CHANGE UP (ref 13–44)
MAGNESIUM SERPL-MCNC: 2.1 MG/DL — SIGNIFICANT CHANGE UP (ref 1.6–2.6)
MCHC RBC-ENTMCNC: 28.7 PG — SIGNIFICANT CHANGE UP (ref 27–34)
MCHC RBC-ENTMCNC: 31.5 G/DL — LOW (ref 32–36)
MCV RBC AUTO: 91.3 FL — SIGNIFICANT CHANGE UP (ref 80–100)
MONOCYTES # BLD AUTO: 0.44 K/UL — SIGNIFICANT CHANGE UP (ref 0–0.9)
MONOCYTES NFR BLD AUTO: 6.5 % — SIGNIFICANT CHANGE UP (ref 2–14)
NEUTROPHILS # BLD AUTO: 4.68 K/UL — SIGNIFICANT CHANGE UP (ref 1.8–7.4)
NEUTROPHILS NFR BLD AUTO: 69.1 % — SIGNIFICANT CHANGE UP (ref 43–77)
NRBC # BLD: 0 /100 WBCS — SIGNIFICANT CHANGE UP (ref 0–0)
NRBC BLD-RTO: 0 /100 WBCS — SIGNIFICANT CHANGE UP (ref 0–0)
PHOSPHATE SERPL-MCNC: 2.7 MG/DL — SIGNIFICANT CHANGE UP (ref 2.5–4.5)
PLATELET # BLD AUTO: 218 K/UL — SIGNIFICANT CHANGE UP (ref 150–400)
POTASSIUM SERPL-MCNC: 3.9 MMOL/L — SIGNIFICANT CHANGE UP (ref 3.5–5.3)
POTASSIUM SERPL-SCNC: 3.9 MMOL/L — SIGNIFICANT CHANGE UP (ref 3.5–5.3)
RBC # BLD: 3.69 M/UL — LOW (ref 4.2–5.8)
RBC # FLD: 14.6 % — HIGH (ref 10.3–14.5)
SODIUM SERPL-SCNC: 137 MMOL/L — SIGNIFICANT CHANGE UP (ref 135–145)
WBC # BLD: 6.78 K/UL — SIGNIFICANT CHANGE UP (ref 3.8–10.5)
WBC # FLD AUTO: 6.78 K/UL — SIGNIFICANT CHANGE UP (ref 3.8–10.5)

## 2024-11-20 PROCEDURE — 99223 1ST HOSP IP/OBS HIGH 75: CPT | Mod: GC

## 2024-11-20 PROCEDURE — 99232 SBSQ HOSP IP/OBS MODERATE 35: CPT

## 2024-11-20 RX ORDER — SOD PHOS DI, MONO/K PHOS MONO 250 MG
1 TABLET ORAL EVERY 6 HOURS
Refills: 0 | Status: COMPLETED | OUTPATIENT
Start: 2024-11-20 | End: 2024-11-20

## 2024-11-20 RX ADMIN — IPRATROPIUM BROMIDE AND ALBUTEROL SULFATE 3 MILLILITER(S): .5; 2.5 SOLUTION RESPIRATORY (INHALATION) at 21:07

## 2024-11-20 RX ADMIN — Medication 1 DROP(S): at 14:06

## 2024-11-20 RX ADMIN — POLYETHYLENE GLYCOL 3350 17 GRAM(S): 17 POWDER, FOR SOLUTION ORAL at 18:52

## 2024-11-20 RX ADMIN — HEPARIN SODIUM 5000 UNIT(S): 1000 INJECTION INTRAVENOUS; SUBCUTANEOUS at 05:17

## 2024-11-20 RX ADMIN — Medication 1 DROP(S): at 18:52

## 2024-11-20 RX ADMIN — Medication 1 DROP(S): at 22:03

## 2024-11-20 RX ADMIN — HEPARIN SODIUM 5000 UNIT(S): 1000 INJECTION INTRAVENOUS; SUBCUTANEOUS at 14:45

## 2024-11-20 RX ADMIN — AMLODIPINE BESYLATE 10 MILLIGRAM(S): 10 TABLET ORAL at 05:17

## 2024-11-20 RX ADMIN — IPRATROPIUM BROMIDE AND ALBUTEROL SULFATE 3 MILLILITER(S): .5; 2.5 SOLUTION RESPIRATORY (INHALATION) at 05:41

## 2024-11-20 RX ADMIN — Medication 1 PACKET(S): at 06:49

## 2024-11-20 RX ADMIN — Medication 1 APPLICATION(S): at 10:52

## 2024-11-20 RX ADMIN — ACETYLCYSTEINE 4 MILLILITER(S): 200 INHALANT RESPIRATORY (INHALATION) at 05:42

## 2024-11-20 RX ADMIN — Medication 1 DROP(S): at 05:18

## 2024-11-20 RX ADMIN — DOXAZOSIN MESYLATE 8 MILLIGRAM(S): 8 TABLET ORAL at 22:04

## 2024-11-20 RX ADMIN — Medication 1 APPLICATION(S): at 05:28

## 2024-11-20 RX ADMIN — Medication 1 PACKET(S): at 11:14

## 2024-11-20 RX ADMIN — Medication 15 MILLILITER(S): at 05:14

## 2024-11-20 RX ADMIN — Medication 15 MILLILITER(S): at 18:52

## 2024-11-20 RX ADMIN — ACETYLCYSTEINE 4 MILLILITER(S): 200 INHALANT RESPIRATORY (INHALATION) at 21:07

## 2024-11-20 RX ADMIN — ACETYLCYSTEINE 4 MILLILITER(S): 200 INHALANT RESPIRATORY (INHALATION) at 14:06

## 2024-11-20 RX ADMIN — IPRATROPIUM BROMIDE AND ALBUTEROL SULFATE 3 MILLILITER(S): .5; 2.5 SOLUTION RESPIRATORY (INHALATION) at 14:05

## 2024-11-20 RX ADMIN — POLYETHYLENE GLYCOL 3350 17 GRAM(S): 17 POWDER, FOR SOLUTION ORAL at 05:17

## 2024-11-20 RX ADMIN — Medication 1 DROP(S): at 02:39

## 2024-11-20 RX ADMIN — Medication 1 DROP(S): at 10:51

## 2024-11-20 RX ADMIN — HEPARIN SODIUM 5000 UNIT(S): 1000 INJECTION INTRAVENOUS; SUBCUTANEOUS at 22:00

## 2024-11-20 NOTE — PROGRESS NOTE ADULT - ASSESSMENT
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; respiratory failure s/p trach/peg readmit for hypoxia 2/2 presumed mucous plugging      NEURO:  NIHSS >25; poor functional recovery anticipated; family aware   -neuro check q4  -PT/OT following  -communication to be addressed by speech  -PMNR consulted    PULMONARY:  tolerating trach collar w/ deflated cuff   Mucomyst & Duoneb to q8    CARDIOVASCULAR:  monitor on telemetry   vitals q1  sbp goal 100-160 ; diastolic pressure >80;c/w  HCTZ 12.5mg daily; c/w amlodipine 10mg     GASTROENTEROLOGY:  PEG in place; c/w tube feeds (nephro)  ensure BMs w/ Miralax & senna-lokelma w/ hold parameters for hyperkalemia 2/2 renal disease     RENAL/:  ANIKA on CKD   -c/w calcium acetate   -free water 250mg q6hrs   -check BMP qd  -strict i/o's ;  -Na goal 135-145  -retatining requiring straight cath will c/w cardura; if continues to require straight cath will replace folry     ENDOCRINE:  Diabetes Mellitus  A1c- 5.3  Monitor FSG q6 hrs while w/ tube feeds   Hypothyroidism   TSH-1.23      HEME/ONC:  DVT ppx: heparin SQ  b/l SCDs    INFECTIOUS:  monitor off abx id sign off sputum culture Stenotrophomonas likely colonization       DISPO: recommended for IRON- (only Emergency Medicaid) pending RUCOL ; social work & CM following   stepdown tele unit    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; respiratory failure s/p trach/peg readmit for hypoxia 2/2 presumed mucous plugging      NEURO:  NIHSS >25; poor functional recovery anticipated; family aware   -neuro check q4  -PT/OT following  -communication to be addressed by speech  -PMNR consulted    PULMONARY:  tolerating trach collar w/ deflated cuff   Mucomyst & Duoneb to q8    CARDIOVASCULAR:  monitor on telemetry   vitals q1  sbp goal 100-160 ; diastolic pressure >80;c/w  HCTZ 12.5mg daily; c/w amlodipine 10mg     GASTROENTEROLOGY:  PEG in place; c/w tube feeds (nephro)  ensure BMs w/ Miralax & senna-lokelma w/ hold parameters for hyperkalemia 2/2 renal disease     RENAL/:  ANIKA on CKD   -c/w calcium acetate   -free water 250mg q6hrs   -check BMP qd  -strict i/o's ;  -Na goal 135-145  retaining requiring straight cath will c/w Cardura if continues to require straight cath will replace folry     ENDOCRINE:  Diabetes Mellitus  A1c- 5.3  Monitor FSG q6 hrs while w/ tube feeds   Hypothyroidism   TSH-1.23      HEME/ONC:  DVT ppx: heparin SQ  b/l SCDs    INFECTIOUS:  monitor off abx id sign off sputum culture Stenotrophomonas likely colonization       DISPO: recommended for IRON- (only Emergency Medicaid) pending RUCOL ; social work & CM following   stepdown tele unit

## 2024-11-20 NOTE — PROVIDER CONTACT NOTE (CHANGE IN STATUS NOTIFICATION) - SITUATION
Pt spontaneously began to drop Spo2 to 90% on 40% TC. Suctioned x 2 with little secretions present. Lungs clear with equal air passage. TC O2 increased to 60%. HR slightly more tacchycardic, 110's. Rectal temp 99.9.

## 2024-11-20 NOTE — CONSULT NOTE ADULT - SUBJECTIVE AND OBJECTIVE BOX
HPI:  Unknown age male, unknown past medical history (reported stroke and MI by coworkers) presented to [facility] with AMS, Pt was working at [employer] when he was found down by coworkers. EMS called and pt brought to [facility] ED. Intubated, sedated, started on cardene for SBPs in 200s. CT head showed brain stem bleed. Transferred to NSICU for further management.  (30 Sep 2024 12:55)    s/p trach 10/14. s/p peg 10/21. Re-upgrade to ICU 2/2 desaturation event and suctioning requirements 11/15.   vEEG (10/3-4)- negative, (10/17-10/19) - negative  CTA chest 11/13 negative for PE, inc ground glass opacities  TF via PEG (placed 10/21 by gen surg)    -----------------------------------------------------------------------  SUBJECTIVE:  Patient seen and evaluated in NSICU. Awake but with poor command following. On Trach collar.   -----------------------------------------------------------------------  REVIEW OF SYSTEMS  limited ROS due to cognitive deficits  -----------------------------------------------------------------------  FUNCTIONAL HISTORY:  Information obtained from EMR.  PAtient previously independent with functional mobility and ADLs/iADLs  Works at a [employer] where he was found down.    CURRENT FUNCTIONAL STATUS:    Physical Therapy: 11/20  Cognitive Status Examination:   · Orientation	person  · Follows Commands and Answers Questions	Pt follows ~80% of simple one step commands; Inconsistent responses to yes/no questions via L eye blink and R hand squeezing    Skin:    Skin:  · Skin Integrity	+dry callused skin on the finger tips of both hands    Range of Motion Exam:   · Passive Range of Motion Examination	bilateral lower extremity Passive ROM was WFL (within functional limits); bilateral upper extremity Passive ROM was WFL (within functional limits)    Manual Muscle Testing:   · Manual Muscle Testing Results	UE MMT: R shoulder 0/5, R elbow with spontaneous 4/5 extension no spontaneous/volitional R elbow flexion, R hand 3/5 (inconsistent squeeze to command); L shoulder 0/5, L elbow 0/5, L hand 1/5; R hip 0/5, R knee ext 1/5, R ankle 2-/5; L hip flex 2-/5, hip internal rotation 2-/5, L knee 1/5, ankle 1/5    Bed Mobility: Rolling/Turning:     · Level of Hood	dependent (less than 25% patients effort)  · Physical Assist/Nonphysical Assist	2 person assist    Bed Mobility: Scooting/Bridging:     · Level of Hood	dependent (less than 25% patients effort)  · Physical Assist/Nonphysical Assist	2 person assist    Bed Mobility: Sit to Supine:     · Level of Hood	dependent (less than 25% patients effort)  · Physical Assist/Nonphysical Assist	2 person assist    Bed Mobility: Supine to Sit:     · Level of Hood	dependent (less than 25% patients effort)  · Physical Assist/Nonphysical Assist	2 person assist    Bed Mobility Analysis:     · Bed Mobility Limitations	impaired ability to control trunk for mobility; decreased ability to use legs for bridging/pushing; decreased ability to use arms for pushing/pulling  · Impairments Contributing to Impaired Bed Mobility	cognition; impaired motor control; abnormal muscle tone; impaired postural control; decreased ROM; decreased strength    Transfer: Sit to Stand:     · Level of Hood	Deferred today due to poor sitting balance and poor head control    Balance Skills Assessment:     · Sitting Balance: Static	poor minus  Pt dangled EOB ~30 minutes requiring max A for trunk/head control in sitting due to strong posterior lean. Pt demonstrates significant forward head posture and requires max A to maintain cervical extension against gravity      Occupational Therapy: 11/20    Bed Mobility: Rolling/Turning:     · Level of Hood	dependent (less than 25% patients effort)  · Physical Assist/Nonphysical Assist	verbal cues; nonverbal cues (demo/gestures); 2 person assist    Bed Mobility: Scooting/Bridging:     · Level of Hood	dependent (less than 25% patients effort)  · Physical Assist/Nonphysical Assist	verbal cues; nonverbal cues (demo/gestures); 2 person assist    Bed Mobility: Sit to Supine:     · Level of Hood	dependent (less than 25% patients effort)  · Physical Assist/Nonphysical Assist	verbal cues; nonverbal cues (demo/gestures); 2 person assist    Bed Mobility: Supine to Sit:     · Level of Hood	dependent (less than 25% patients effort)  · Physical Assist/Nonphysical Assist	verbal cues; nonverbal cues (demo/gestures); 2 person assist    Bed Mobility Analysis:     · Bed Mobility Limitations	decreased ability to use arms for pushing/pulling; decreased ability to use legs for bridging/pushing; impaired ability to control trunk for mobility  · Impairments Contributing to Impaired Bed Mobility	impaired balance; cognition; impaired coordination; decreased flexibility; impaired motor control; impaired postural control; decreased ROM; decreased strength    Transfer: Sit to Stand:     · Level of Hood	unable to perform    Transfer: Stand to Sit:     · Level of Hood	unable to perform    Balance Skills Assessment:     · Sitting Balance: Static	poor balance  · Sitting Balance: Dynamic	poor balance  · Sit-to-Stand Balance	not performed as pt with poor sitting balance    Sensory Examination:   · Balance Skills	Pt tolerated sitting EOB ~30 minutes with constant maxA for postural control.          Speech Therapy: 11/20    Clinical Impression and Recommendations:   · Diagnosis	Assessment limited to nonverbal gestures for basic yes/no questions. Pt with 90% accuracy utilizing R hand squeeze to indicate "yes." Pt inconsistently turned head and opened mouth upon request. Unable to assess motor speech and voicing. No verbal communicative attempts. PMV assessment completed as well- please refer to report. Will continue to follow for communication.  · Criteria for Skilled Therapeutic Interventions Met	skilled criteria for intervention met  · Comments	Utilize basic yes/no questions - pt squeezes right hand consistently for "yes" given yes/no questions. Allow for pauses in between.   Will need to further assess    Auditory Comprehension:   · Answers Yes/No Questions	within functional limits   · concrete	yes  90% acc, squeezes R hand for "yes" to answer yes/no questions   · Able to Follow Commands	impaired  opened mouth inconsistently upon request, turned head inconsistently upon request     Verbal Expression:   · Verbal Expression	unable to assess  nonverbal     Non-Verbal Expressions:   · Eye Blink	will further assess  · Comments	squeeze R hand for yes/no questions    Reading:   · Comments	DNT    Cognitive Linguistic Status Examination:   · Comments	DNT    Writing:   · Comments	DNT    Motor Speech:   · Fluency	unable to assess     Voice:   · Comments	unable to assess          -----------------------------------------------------------------------  PAST MEDICAL & SURGICAL HISTORY  Acute myocardial infarction    CVA (cerebral vascular accident)      FAMILY HISTORY     SOCIAL HISTORY  As above  -----------------------------------------------------------------------  VITALS  T(C): 37.7 (11-20-24 @ 14:02), Max: 37.7 (11-20-24 @ 14:02)  HR: 111 (11-20-24 @ 16:38) (82 - 111)  BP: 133/90 (11-20-24 @ 14:00) (120/83 - 156/105)  RR: 20 (11-20-24 @ 14:00) (16 - 20)  SpO2: 93% (11-20-24 @ 16:38) (92% - 100%)  Wt(kg): --    PHYSICAL EXAM  Constitutional - NAD, Comfortable  HEENT - NCAT  Chest - Breathing comfortably, +trach collar  Cardiovascular - Regular pulse  Abdomen - No visible abdominal distension  Extremities - No deformities of upper or lower limbs. No calf tenderness   MSK - ROM within functional limits  Neurologic Exam -                    Cognitive - Awake, follows some commands inconsistently, squeezes hand     Communication - non-verbal     Cranial Nerves -  difficulty tracking     Motor - trace movement of right hand. No other voluntary movement at this time, although limited by fatigue   Psychiatric - Mood stable, Affect WNL   -----------------------------------------------------------------------  RECENT LABS  CBC Full  -  ( 20 Nov 2024 04:35 )  WBC Count : 6.78 K/uL  RBC Count : 3.69 M/uL  Hemoglobin : 10.6 g/dL  Hematocrit : 33.7 %  Platelet Count - Automated : 218 K/uL  Mean Cell Volume : 91.3 fl  Mean Cell Hemoglobin : 28.7 pg  Mean Cell Hemoglobin Concentration : 31.5 g/dL  Auto Neutrophil # : 4.68 K/uL  Auto Lymphocyte # : 1.23 K/uL  Auto Monocyte # : 0.44 K/uL  Auto Eosinophil # : 0.36 K/uL  Auto Basophil # : 0.05 K/uL  Auto Neutrophil % : 69.1 %  Auto Lymphocyte % : 18.1 %  Auto Monocyte % : 6.5 %  Auto Eosinophil % : 5.3 %  Auto Basophil % : 0.7 %    11-20    137  |  105  |  51[H]  ----------------------------<  105[H]  3.9   |  21[L]  |  2.00[H]    Ca    9.4      20 Nov 2024 04:35  Phos  2.7     11-20  Mg     2.1     11-20      Urinalysis Basic - ( 20 Nov 2024 04:35 )    Color: x / Appearance: x / SG: x / pH: x  Gluc: 105 mg/dL / Ketone: x  / Bili: x / Urobili: x   Blood: x / Protein: x / Nitrite: x   Leuk Esterase: x / RBC: x / WBC x   Sq Epi: x / Non Sq Epi: x / Bacteria: x      -----------------------------------------------------------------------  IMAGING:  < from: US Duplex Venous Lower Ext Complete, Bilateral (11.11.24 @ 19:40) >    IMPRESSION:  No evidence of deep venous thrombosis in either lower extremity.    < end of copied text >    < from: CT Head No Cont (10.25.24 @ 21:18) >    IMPRESSION:    1.  Expected evolution of a parenchymal hemorrhage in the nabil and   subacute infarction in the cerebellum.  2.  No interval rebleeding.  3.  Worsened opacification of the right mastoid.      < end of copied text >    < from: CT Abdomen and Pelvis w/ IV Cont (11.08.24 @ 02:53) >  IMPRESSION:    1. Right basilar and left medial basilar atelectasis; however, cannot   exclude superimposed pneumonia. Partially visualized inspissated   secretions along with bibasilar distal airways.    2. No evidence of an infectious process within the abdomen and pelvis.          < end of copied text >    -----------------------------------------------------------------------  ALLERGIES  Allergy Status Unknown      MEDICATIONS   acetaminophen   Oral Liquid .. 650 milliGRAM(s) Oral every 6 hours PRN  acetylcysteine 10%  Inhalation 4 milliLiter(s) Inhalation every 8 hours  albuterol/ipratropium for Nebulization 3 milliLiter(s) Nebulizer every 8 hours  amLODIPine   Tablet 10 milliGRAM(s) Oral daily  artificial tears (preservative free) Ophthalmic Solution 1 Drop(s) Right EYE every 4 hours  chlorhexidine 0.12% Liquid 15 milliLiter(s) Oral Mucosa every 12 hours  chlorhexidine 2% Cloths 1 Application(s) Topical <User Schedule>  doxazosin 8 milliGRAM(s) Oral at bedtime  heparin   Injectable 5000 Unit(s) SubCutaneous every 8 hours  hydrochlorothiazide 12.5 milliGRAM(s) Oral daily  insulin lispro (ADMELOG) corrective regimen sliding scale   SubCutaneous every 6 hours  petrolatum Ophthalmic Ointment 1 Application(s) Both EYES two times a day  polyethylene glycol 3350 17 Gram(s) Oral two times a day  sodium zirconium cyclosilicate 5 Gram(s) Oral daily    -----------------------------------------------------------------------

## 2024-11-20 NOTE — PROVIDER CONTACT NOTE (CHANGE IN STATUS NOTIFICATION) - ASSESSMENT
While providers at bedside, pt's Spo2 continued to drop to 89%. Increased O2 to 70% with little response. Increased to 100% O2 and bagged pt with no resistance met. T/o pt shows no s/s of resp distress except dropping Spo2. Good waveform. RR 16-20. After bagging at 100% pt's Spo2 increased to 96%. Remained steady at 93-94% on 100% O2 TC.

## 2024-11-20 NOTE — CONSULT NOTE ADULT - ASSESSMENT
44 year old male with functional, ADL, cognitive, and speech impairments in the setting of brainstem hemorrhage, s/p trach and PEG.     Brainstem hemorrhage   Respiratory dysfunction s/p Trach  Dysphagia s/p PEG  HTN - amlodipine, HCTZ  Urinary retention       PLAN / RECOMMENDATIONS:     # Rehab / Mobilization:   - Initial therapy assessment: [X] PT  [X] OT   - Continue skilled therapy while admitted to prevent secondary complications of immobility focus on transfer training, bed mobility, progressive ambulation, and equipment evaluation.   - Educated patient and family members on post-acute rehabilitation. Discussed anticipated rehab course.  - Encouraged HOB elevation to at least 30-40 deg to prevent orthostasis   - nursing to reposition q2 turning to prevent skin breakdown given limited mobility   - Keep extremities elevated on pillows to assist with edema and positioning.   - Therapy precautions: falls, orthostasis, hypoxia    # Bracing/Splinting:   - Z-Flow multi-podus boots for positioning/contracture prevention  - Consider resting hand splints    # Pain Management:   - Avoid/ monitor use sedating medications that may interfere with cognitive recovery   - Current pain regimen reviewed    # Speech/ Swallow:   - Dysphagia screen [X]  - SLP consult for swallow function evaluation and treatment, evaluate for PMV  - Diet:  NPO, continuous feeds through PEG    # GI/ Bowel:  - Continue to monitor bowel patterns.   - Bowel Regimen: miralax    # / Bladder:  - TOV - Bladder scans for PVR or q6hrs (if no void); Straight cath for residual urine >400cc  - Continue to monitor bladder patterns, avoid constipation.       # DVT Prophylaxis:   - SCDs, chemoprophylaxis - heparin    # Disposition optimization:     - Disposition recommendation - Inpatient Rehabilitation vs SNF  - Showing some neurologic improvement and command following. If continues to make functional gains, consider acute inpatient rehab placement  - Care coordination working on emergency medicaid

## 2024-11-20 NOTE — PROGRESS NOTE ADULT - SUBJECTIVE AND OBJECTIVE BOX
S/Overnight events:  BD 51. No acute events overnight. Transfer to stepdown unit today.       Hospital Course:   9/30: transferred from [facility]. A line placed. Versed dc'd. Cy Rader at bedside, states pt has family in [place], cannot confirm medications or PMH other than stroke and MI. 250cc bolus 3% given. LR switched to NS. hydralazine 25q8 started, 3% started, switched propofol to precedex   10/1: stability CTH done. Added labetalol, started TF. Palliative consulted. ethics consulted to determine surrogate. febrile 103, pan cx sent  10/2: BD 2, GEORGE overnight. TF resumed. Desatt'd to 80s, FiO2 inc. to 50. Fentanyl given, ABG, CXR ordered. Maxxed on precedex, started on propofol for DARIEN -4 - -5. Precedex dc'd. Duonebs, mucomyst, hypertonic added. 3% dc'd. Cardene dc'd. Start vanc/CTX. Increased labetalol 200q8. MRSA negative, dc'd vanc. ETT pulled back 2cm x 2, good positioning after confirmatory chest xray. Ethics attempting to establish HCP with family. Na 159, starting FW 250q6 for range 150-155.   10/3: BD3, GEORGE o/n, neuro stable. Na elevating, FW increased to 300q6. Dc'd bowel reg for diarrhea. vEEG started. SQH 5000q8 tonight.   10/4: BD 4, albumn bolus, incr. LR to 80 2/2 incr. in Cr, LR to 100cc/hr for uptrending Cr. Started 7% hypersal for 48hrs and SL atropine for inline/oral thick secretions. Dc'd CTX and started ancef for MSSA in the sputum. Nephrology consulted for CKD, f/u recs. SBP 170s, given hydralazine 10mg IVP.   10/5: BD5, o/n 10mg IVP hydralazine given for SBP 170s and started on hydralazine 25q8 via OGT. 10mg IV push labetalol for SBP > 160s. RT placed for diarrhea.   10/6: BD6, o/n FW increased to 350q4 per nephrology recs. IV tylenol for temp 100.6, SBp 160s presumed uncomfortable.   10/7: BD7, overnight pancultured for temp 101.8F.   10/8: BD8. GEORGE. Cr bumped. decreased LR to 75cc/hr. Adding simethicone ATC. incr hydralazine 50mgTID. Incr labetalol 300mgTID. Na 145, decreased FWF to 250q6. Start precedex. FENa consistent with intrinsic kidney injury. Pend repeat renal US. Retaining up to 1.3L, bladder scans q6, straight cath PRN  10/9: BD 9. GEORGE overnight. Neuro stable. abd xray for distention w non-specific gas pattern, OGT to LIWS for morning. duonebs/mucomyst to q8 for improving secretions. Changed tube feeds to Jevity 1.5 20cc/hr, low rate due to abdominal distention, nepro dense and more difficult to digest. Tolerating CPAP, confirmed by ABG.   10/10: BD 10. GOERGE overnight. Neuro stable. (+) gabriel for urinary retention on bladder scan. inc TF to goal rate of 40cc/hr. family leaning toward pursuing trach/PEG. 1/2 amp for FS 81.   10/11: BD 11. GEORGE overnight. Neuro stable. Trach/PEG consults placed.   10/12: BD 12. GEORGE overnight. Neuro stable. MRI brain complete.   10/13: BD 13. Increase flomax. Hold SQH after PM dose for trach tm. IVL.   10/14: BD 14. GEORGE overnight, remains on AC/VC. Gabriel placed for urinary retention. Dc'd free water.  S/p trach with pulm. NGT placed and CXR confirmed in good position.   10/15: BD 15, GEORGE ovn. resumed feeds. spiked 101, pan cx sent.   10/16: BD 16. GEORGE ovn. Lokelma 5mg for K+ 5.4. Started vanc q 24/zosyn for empiric PNA coverage, IVF to 100/hr. PEG held for fever.   10/17: BD 17,  ordered serum osm and urine osm for am. Started sinemet for neurostimulation. Increased cardura to 0.8. Started FW 100q4, dc'd IVF. MRSA negative, dc'd vanc. NGT replaced d/t coiling.   10/18: BD 18, GEORGE overnight, neuro stable. Amantadine added for neurostim. zosyn changed to unasyn for acinetobacter baumannii, failed TOV and required SC  10/19: BD 19, GEORGE ovn. cardura 2mg added for retention. labetalol decreased 200q8, hydralazine decreased 25q8. Gabriel replaced.   10/20: BD20, GEORGE overnight. NGT dislodged, replaced. PEG tomorrow w/ gen surg, FW increased to 150q4 and labetalol decreased to 100q8, lokelma given for hyperkalemia.   10/21: BD 21. POD0 PEG placement with Gen surg. decr labetolol to 50q8, incr. cardura to 0.4, started lokelma and phoslo, dc gabriel POD0 PEG placement with Gen surg.  10/22: BD 22. Plan to start TF today via PEG. dc labetalol, Following ophtho recs. Increased apnea settings - found to be in cheyne-moe respiration. CPAP 5/5.  10/23: BD 23. hydralazine d/c'd, trach collar trial today. Rectal tube placed at 6am.  10/24: BD24, o/n lokelma held due to diarrhea. Free water 100q6 resumed. dc'd tamsulosin, amantadine. Incr'd cardura to 8mg qhs. Dc'd FW. Switched jevity to nepro. gabriel placed for high urine output. Started SL atropine for oral secretions. Dc'd free water.  10/25: BD25, o/n decreased suctioning requirements to > q4hrs, GEORGE. Cr improving, cont phoslo, lokelma held at this time. Gabriel placed yest, cont. Tolerating trach collar. Given 500cc plasmalyte bolus for ANIKA. Dc'd sinemet.   10/26: BD26, o/n resumed lokelma 5mg daily and resumed 100cc free water q6hrs. Change in neuro status with new right pupillary dilation with anisocoria (right pupil 6mm fixed and left pupil 3mm briskly reactive). Given 23.4% NaCl bullet, taken for emergent CTH showing mostly resolved pontine hemorrhage, continued brainstem hypodensity likely edema d/t hemorrhage, no new hemorrhage or infarct, no herniation, mild increase in size of left lateral ventricle. Vitals remaine stable. Na goal > 140.   10/27: BD27, o/n GEORGE.Neuro stable. Pend stepdown with airway bed.   10/28: BD 28. GEORGE overnight. Neuro stable. Miralax ordered. Gabriel removed, pending TOV.  10/29: BD 29. GEORGE o/n. Given 2L NS over 8 hrs for increased BUN/Cr ratio. Gabriel placed for frequent straight cath.   10/30: BD 30.   10/31: BD 31. GEORGE overnight. Na 149, increased free water to 200q6. 1L NS for uptrending BUN.   11/1: BD 32. GEORGE overnight. Given 1L NS for dehydration. Na 146, increased FW to 250q6.   11/2: BD 33 GEORGE overnight, neuro stable, given 500cc bolus for net negative status and tachycardia   11/3: BD 34, GEORGE overnight, neuro stable. Patient remains tachycardic, EKG showing sinus tachycardia, given additional 500cc NS bolus. Febrile to 101.9F, pan cultured (without UA), CXR WNL, given tylenol.   11/4: BD 35, GEORGE overnight, neuro stable. Given 1L NS for tachycardia. sputum (+) for stenotropohomas maltophilia.   11/5: BD 36 GEORGE overnight, neuro stable. Vancomycin dc'd. Chest PT BID. ID consulted, cont zosyn.  11/6: BD 37. blood cx + klebsiella dc zosyn changed to cefepime, CTAP ordered, rpt blood cx sent.    11/7: BD 38. Pending CT A/P, given 250cc bolus and starting maintenance fluids overnight. Pending CT A/P after bolus   11/8: BD 39. CT CAP negative for infection.   11/9: BD 40. GEORGE overnight.  11/10: BD 41. GOERGE overnight. desat to 85 on trach collar, O2 inc to 10L and 100%, O2 sat inc to 95. pt tachy to 110s, euvolemic. given tylenol. ABG and CXR ordered. spiked fever, pancultured, RVP negative. AM ABG w pO2 79, rpt w pO2 79. pt appears comfortable, satting 94%.   11/11: BD 42. GEORGE overnight. pt became tachy to 130s, desat to 90 on 100% FiO2 and 10L. suctioned, (+) productive cough. temp 101.4, given 1g IV tylenol and 500cc NS bolus for euvolemia. fever and HR downtrending. LE dopplers negative for dvt  11/12: BD 43, GEORGE ovn, fever and HR downtrending, satting 97% 70% FIO2  11/13: BD 44, GEORGE ovn. started standing tylenol x24 hours for tachycardia. desat to 80s, o2 increased. CXR stable, pending CTA PE protocol.   11/14: BD 45, GEORGE overnight, neuro stable. resp therapy dec FiO2 to 70%.   11/15: BD 46, GEORGE overnight, neuro stable.  Rapid called for desaturation 30s, tachycardic 140s. Patient bagged, 100% fio2, heavily suctioned. CXR/POCUS unremarkable. ABG c/w desaturation. WBC 14.71. Afebrile. O2 improved to 90s and patient upgraded to ICU. ABG paO2 30s improved to 89 on vent. IV Tylenol x 1, sputum sent. Start protonix while o-n vent.   11/16: BD 47. POCUS showed collapsable IVCF, given 1L bolus. Vanco/Cefpime added empriic for PNA, NGT feeds restarted. MRSA swab neg, Vanc DC'd.   11/17: BD 48. GEORGE overnight. 1l bolus for tachycardia. Spiked to 101, cultured. 500cc bolus for tachycardia, tachy to 148 given 25mcg fentanyl, 250cc albumin, 1.5L bolus. 5 IV lopressor with response HR to 100s. +Stenotrophomonas on sputum cx.   11/18: BD 49. GEORGE overnight. Consulted ID, cefepime switched to bactrim x7days. Started hydrochlorothiazine 12.5mg daily.  11/19: BD 50. Tachy 120s, given tylenol and 500cc NS. Tolerating 5/5, switched to TCx. Holding phos binder. D/c Bactrim. D/c nery, f/u TOV. Dc'd PPI.   11/20: BD 51. GEORGE coatsn. Stepdown today.       Vital Signs Last 24 Hrs  T(C): 36.9 (20 Nov 2024 09:22), Max: 36.9 (19 Nov 2024 13:56)  T(F): 98.5 (20 Nov 2024 09:22), Max: 98.5 (20 Nov 2024 09:22)  HR: 100 (20 Nov 2024 12:00) (78 - 105)  BP: 137/101 (20 Nov 2024 12:00) (120/83 - 156/105)  BP(mean): 115 (20 Nov 2024 12:00) (97 - 122)  RR: 20 (20 Nov 2024 12:00) (16 - 20)  SpO2: 98% (20 Nov 2024 12:00) (92% - 100%)    Parameters below as of 20 Nov 2024 09:00  Patient On (Oxygen Delivery Method): tracheostomy collar    O2 Concentration (%): 40    I&O's Detail    19 Nov 2024 07:01  -  20 Nov 2024 07:00  --------------------------------------------------------  IN:    Free Water: 550 mL    Nepro with Carb Steady: 1080 mL  Total IN: 1630 mL    OUT:    Indwelling Catheter - Urethral (mL): 110 mL    Intermittent Catheterization - Urethral (mL): 1900 mL  Total OUT: 2010 mL    Total NET: -380 mL      20 Nov 2024 07:01  -  20 Nov 2024 12:31  --------------------------------------------------------  IN:    Free Water: 250 mL    Nepro with Carb Steady: 180 mL  Total IN: 430 mL    OUT:    Intermittent Catheterization - Urethral (mL): 535 mL  Total OUT: 535 mL    Total NET: -105 mL        I&O's Summary    19 Nov 2024 07:01  -  20 Nov 2024 07:00  --------------------------------------------------------  IN: 1630 mL / OUT: 2010 mL / NET: -380 mL    20 Nov 2024 07:01  -  20 Nov 2024 12:31  --------------------------------------------------------  IN: 430 mL / OUT: 535 mL / NET: -105 mL        PHYSICAL EXAM:  Gen: No acute distress, resting comfortably in bed  Skin: Warm and dry. No rashes or lesions.  HEENT: Head without asymmetry or deformity. PERRL 3mm b/l, vertical EOM, tracts vertically, spontaneously opens eyes, no conjunctival redness, sclera is clear, hearing intact to whisper test bilaterally, nares patent bilaterally.  Lungs: +trach to vent, breathing is non-labored, symmetrical chest wall movement, lung sounds are clear bilaterally without rales, rhonchi, or wheezing.   Heart: Regular rate, S1, and S2 without murmur or gallop  Abd: +PEG. Abdomen is soft, non-distended. Normal bowel sounds on auscultation. No masses, hepatomegaly, or splenomegaly appreciated.  Peripheral vascular: Capillary refill <2 seconds; bilateral radial, posterior tibialis, and dorsalis pedis pulses 2/4.  Neuro: A&Ox0, intermittently follows commands, smiles on command, CN II-XII grossly intact; able to show 2 fingers intermittently on commands, squeezes fingers.  LUE not responsive to noxious stimuli; RLE wiggles toes on command; LLE withdraws from noxious stimuli.   Wounds/Drains: PEG and trach sites clean without surrounding erythema or purulent drainage.      DEVICE/DRAIN DRESSING:    TUBES/LINES:  [] CVC  [] A-line  [] Lumbar Drain  [] Ventriculostomy  [] Other    DIET:  [] NPO  [] Mechanical  [x] Tube feeds    LABS:    Urinalysis Basic - ( 20 Nov 2024 04:35 )    Color: x / Appearance: x / SG: x / pH: x  Gluc: 105 mg/dL / Ketone: x  / Bili: x / Urobili: x   Blood: x / Protein: x / Nitrite: x   Leuk Esterase: x / RBC: x / WBC x   Sq Epi: x / Non Sq Epi: x / Bacteria: x          CAPILLARY BLOOD GLUCOSE      POCT Blood Glucose.: 104 mg/dL (20 Nov 2024 11:43)  POCT Blood Glucose.: 106 mg/dL (20 Nov 2024 11:33)  POCT Blood Glucose.: 105 mg/dL (20 Nov 2024 06:27)  POCT Blood Glucose.: 106 mg/dL (19 Nov 2024 23:20)  POCT Blood Glucose.: 103 mg/dL (19 Nov 2024 17:18)      Drug Levels: [] N/A    CSF Analysis: [] N/A      Allergies    Allergy Status Unknown    Intolerances      MEDICATIONS:  Antibiotics:    Neuro:  acetaminophen   Oral Liquid .. 650 milliGRAM(s) Oral every 6 hours PRN    Anticoagulation:  heparin   Injectable 5000 Unit(s) SubCutaneous every 8 hours    OTHER:  acetylcysteine 10%  Inhalation 4 milliLiter(s) Inhalation every 8 hours  albuterol/ipratropium for Nebulization 3 milliLiter(s) Nebulizer every 8 hours  amLODIPine   Tablet 10 milliGRAM(s) Oral daily  artificial tears (preservative free) Ophthalmic Solution 1 Drop(s) Right EYE every 4 hours  chlorhexidine 0.12% Liquid 15 milliLiter(s) Oral Mucosa every 12 hours  chlorhexidine 2% Cloths 1 Application(s) Topical <User Schedule>  doxazosin 8 milliGRAM(s) Oral at bedtime  hydrochlorothiazide 12.5 milliGRAM(s) Oral daily  insulin lispro (ADMELOG) corrective regimen sliding scale   SubCutaneous every 6 hours  petrolatum Ophthalmic Ointment 1 Application(s) Both EYES two times a day  polyethylene glycol 3350 17 Gram(s) Oral two times a day  sodium zirconium cyclosilicate 5 Gram(s) Oral daily    IVF:    CULTURES:  Culture Results:   No growth at 72 Hours (11-17 @ 05:59)  Culture Results:   Mixed gram negative rods including  Moderate Stenotrophomonas maltophilia  Commensal iram consistent with body site (11-16 @ 01:53)    RADIOLOGY & ADDITIONAL TESTS:      ASSESSMENT:  43 y/o PMH ?stroke/MI present to [facility] after collapsing at work. Decorticate posturing, vomiting, intubated for airway protection. 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 Transferred to [facility] for further management. s/p trach 10/14. s/p peg 10/21. Re-upgrade to ICU 2/2 desaturation event and suctioning requirements 11/15.      PLAN:  Neuro:  - neuro q4/vitals q2  - pain control: tylenol prn  - vEEG (10/3-4)- negative, (10/17-10/19) - negative  - CTH 9/30: enlarged pontine hemorrhage, CTH 10/3: stable, CTH 10/25 anisocoric pupils showing mostly resolved pontine hemorrhage, no new hemorrhage or infarct, no herniation  - MRI brain 10/12: parenchymal hemorrhage, acute/subacute R cerebellar stroke    - stroke core measures, stroke neuro signed off    CV:  - -160  - HTN: amlodipine 10 mg qd, HCTZ 12.5 daily  - echo (9/30) EF 75%    Resp:  - Trach collar 11/19  - Secretions: duonebs/mucomyst/chest PT q8h   - CTA chest 11/13 negative for PE, inc ground glass opacities    GI:  - TF via PEG (placed 10/21 by gen surg)  - bowel regimen, last BM 11/17  - CTAP 11/8 negative for infection      Renal:  - IVL  - FW flushes 250cc q6hrs   - hyperK 10/20: lokelma 5mg qd  - f/u TOV (gabriel d/c 11/19)  - Urinary retenion: Cardura 8 mg   - CKD: trend BUN/Cr  - renal US 10/1: echogenicity c/w chronic med renal dz, repeat 10/8: inc renal echogeicity, c/f medical renal disease w increased hydro     Endo:  - ISS  -  A1c 5.4    Heme:  - DVT ppx: SCDs, SQH 5000u q8h   - LE dopplers negative 11/11    ID:  - febrile, last pancx 11/16  -  s/p Stenotrophomonas maltophilia PNA: s/p Bactrim (11/18-11/19) s/p Cefepime (11/16-11/18)  - MRSA swab neg 11/16   - pan cultured 11/3, (+) sputum for stenotrophomas maltophlia, blood cx (+) klebsiella, cefepime 2gq12 (11/6 - 11/12)   - empiric tx: s/p zosyn (11/3-11/6) , s/p vanc  (11/3- 11/5)  - S/p Ancef (10/4-10/14) for PNA, and s/p Unasyn (10/18-10/23) +actinobacter baumanii    MISC:  - ophtho consult for keratitis, s/p erythromycin ointment to rt eye q4hrs, ofloxacin ointment to rt eye QID, artificial tears to rt eye q2hrs, moisture chamber at bedtime    Dispo: telemetry, full code, pending placement and emergency medicaid     D/w Dr. D'Amico and Abdirizak

## 2024-11-20 NOTE — PROGRESS NOTE ADULT - SUBJECTIVE AND OBJECTIVE BOX
INTERVAL HISTORY: HPI:  Unknown age male, unknown past medical history (reported stroke and MI by coworkers) presented to [facility] with AMS, Pt was working at [employer] when he was found down by coworkers. EMS called and pt brought to [facility] ED. Intubated, sedated, started on Cardene for SBPs in 200s. CT head showed brain stem bleed. Transferred to NSICU for further management.  (30 Sep 2024 12:55)    Drug Dosing Weight  Height (cm): 172.7 (30 Sep 2024 08:39)  Weight (kg): 80 (30 Sep 2024 09:04)  BMI (kg/m2): 26.8 (30 Sep 2024 09:04)  BSA (m2): 1.94 (30 Sep 2024 09:04)    PAST MEDICAL & SURGICAL HISTORY:  Acute myocardial infarction  CVA (cerebral vascular accident)    REVIEW OF SYSTEMS: [ ] Unable to Assess due to neurologic exam   [ ] All ROS addressed below are non-contributory, except:  Neuro: [ ] Headache [ ] Back pain [ ] Numbness [ ] Weakness [ ] Ataxia [ ] Dizziness [ ] Aphasia [ ] Dysarthria [ ] Visual disturbance  Resp: [ ] Shortness of breath/dyspnea, [ ] Orthopnea [ ] Cough  CV: [ ] Chest pain [ ] Palpitation [ ] Lightheadedness [ ] Syncope  Renal: [ ] Thirst [ ] Edema  GI: [ ] Nausea [ ] Emesis [ ] Abdominal pain [ ] Constipation [ ] Diarrhea  Hem: [ ] Hematemesis [ ] bright red blood per rectum  ID: [ ] Fever [ ] Chills [ ] Dysuria  ENT: [ ] Rhinorrhea    PHYSICAL EXAM:  General: No Acute Distress   Neurological: eyes open spontaneously, tracks vertically, RUE wiggles fingers to commands,, b/l LE WD, LUE extension, RUE WD, pupils b/l 3mm reactive, cough gag (+)  Pulmonary: Clear to Auscultation, No Rales, No Rhonchi, No Wheezes   Cardiovascular: S1, S2, Regular Rate and Rhythm   Gastrointestinal: Soft, Nontender, Nondistended Extremities: No calf tenderness             ICU Vital Signs Last 24 Hrs  T(C): 36.9 (20 Nov 2024 09:22), Max: 36.9 (19 Nov 2024 13:56)  T(F): 98.5 (20 Nov 2024 09:22), Max: 98.5 (20 Nov 2024 09:22)  HR: 102 (20 Nov 2024 09:00) (78 - 103)  BP: 132/89 (20 Nov 2024 09:00) (120/83 - 156/105)  BP(mean): 106 (20 Nov 2024 09:00) (97 - 122)  ABP: --  ABP(mean): --  RR: 16 (20 Nov 2024 09:00) (16 - 18)  SpO2: 96% (20 Nov 2024 09:00) (92% - 100%)      11-19-24 @ 07:01  -  11-20-24 @ 07:00  --------------------------------------------------------  IN: 1630 mL / OUT: 2010 mL / NET: -380 mL        Mode: standby    acetaminophen   Oral Liquid .. 650 milliGRAM(s) Oral every 6 hours PRN  acetylcysteine 10%  Inhalation 4 milliLiter(s) Inhalation every 8 hours  albuterol/ipratropium for Nebulization 3 milliLiter(s) Nebulizer every 8 hours  amLODIPine   Tablet 10 milliGRAM(s) Oral daily  artificial tears (preservative free) Ophthalmic Solution 1 Drop(s) Right EYE every 4 hours  chlorhexidine 0.12% Liquid 15 milliLiter(s) Oral Mucosa every 12 hours  chlorhexidine 2% Cloths 1 Application(s) Topical <User Schedule>  doxazosin 8 milliGRAM(s) Oral at bedtime  heparin   Injectable 5000 Unit(s) SubCutaneous every 8 hours  hydrochlorothiazide 12.5 milliGRAM(s) Oral daily  insulin lispro (ADMELOG) corrective regimen sliding scale   SubCutaneous every 6 hours  petrolatum Ophthalmic Ointment 1 Application(s) Both EYES two times a day  polyethylene glycol 3350 17 Gram(s) Oral two times a day  potassium phosphate / sodium phosphate Powder (PHOS-NaK) 1 Packet(s) Oral every 6 hours  sodium zirconium cyclosilicate 5 Gram(s) Oral daily      LABS:  Na: 137 (11-20 @ 04:35), 138 (11-19 @ 16:10), 141 (11-19 @ 05:30), 144 (11-18 @ 05:30)  K: 3.9 (11-20 @ 04:35), 4.4 (11-19 @ 16:10), 4.0 (11-19 @ 05:30), 4.5 (11-18 @ 05:30)  Cl: 105 (11-20 @ 04:35), 109 (11-19 @ 16:10), 112 (11-19 @ 05:30), 115 (11-18 @ 05:30)  CO2: 21 (11-20 @ 04:35), 18 (11-19 @ 16:10), 19 (11-19 @ 05:30), 20 (11-18 @ 05:30)  BUN: 51 (11-20 @ 04:35), 46 (11-19 @ 16:10), 50 (11-19 @ 05:30), 50 (11-18 @ 05:30)  Cr: 2.00 (11-20 @ 04:35), 1.78 (11-19 @ 16:10), 1.81 (11-19 @ 05:30), 1.80 (11-18 @ 05:30)  Glu: 105(11-20 @ 04:35), 118(11-19 @ 16:10), 146(11-19 @ 05:30), 109(11-18 @ 05:30)    Hgb: 10.6 (11-20 @ 04:35), 9.9 (11-19 @ 05:30), 9.9 (11-18 @ 05:30)  Hct: 33.7 (11-20 @ 04:35), 30.8 (11-19 @ 05:30), 31.8 (11-18 @ 05:30)  WBC: 6.78 (11-20 @ 04:35), 9.71 (11-19 @ 05:30), 9.07 (11-18 @ 05:30)  Plt: 218 (11-20 @ 04:35), 188 (11-19 @ 05:30), 179 (11-18 @ 05:30)      ABG - ( 18 Nov 2024 11:50 )  pH, Arterial: 7.38  pH, Blood: x     /  pCO2: 35    /  pO2: 148   / HCO3: 21    / Base Excess: -3.8  /  SaO2: 99.9                 INTERVAL HISTORY: HPI:  Unknown age male, unknown past medical history (reported stroke and MI by coworkers) presented to [facility] with AMS, Pt was working at [employer] when he was found down by coworkers. EMS called and pt brought to [facility] ED. Intubated, sedated, started on Cardene for SBPs in 200s. CT head showed brain stem bleed. Transferred to NSICU for further management.  (30 Sep 2024 12:55)    Drug Dosing Weight  Height (cm): 172.7 (30 Sep 2024 08:39)  Weight (kg): 80 (30 Sep 2024 09:04)  BMI (kg/m2): 26.8 (30 Sep 2024 09:04)  BSA (m2): 1.94 (30 Sep 2024 09:04)    PAST MEDICAL & SURGICAL HISTORY:  Acute myocardial infarction  CVA (cerebral vascular accident)    REVIEW OF SYSTEMS: [ ] Unable to Assess due to neurologic exam   [ ] All ROS addressed below are non-contributory, except:  Neuro: [ ] Headache [ ] Back pain [ ] Numbness [ ] Weakness [ ] Ataxia [ ] Dizziness [ ] Aphasia [ ] Dysarthria [ ] Visual disturbance  Resp: [ ] Shortness of breath/dyspnea, [ ] Orthopnea [ ] Cough  CV: [ ] Chest pain [ ] Palpitation [ ] Lightheadedness [ ] Syncope  Renal: [ ] Thirst [ ] Edema  GI: [ ] Nausea [ ] Emesis [ ] Abdominal pain [ ] Constipation [ ] Diarrhea  Hem: [ ] Hematemesis [ ] bright red blood per rectum  ID: [ ] Fever [ ] Chills [ ] Dysuria  ENT: [ ] Rhinorrhea    PHYSICAL EXAM:  General: No Acute Distress, trach collar   Neurological: eyes open spontaneously, tracks vertically, RUE wiggles fingers to commands,, b/l LE WD, LUE extension, RUE WD, pupils b/l 3mm reactive, cough gag (+)  Pulmonary: Clear to Auscultation, No Rales, No Rhonchi, No Wheezes   Cardiovascular: S1, S2, Regular Rate and Rhythm   Gastrointestinal: Soft, Nontender, Nondistended Extremities: No calf tenderness       ICU Vital Signs Last 24 Hrs  T(C): 36.9 (20 Nov 2024 09:22), Max: 36.9 (19 Nov 2024 13:56)  T(F): 98.5 (20 Nov 2024 09:22), Max: 98.5 (20 Nov 2024 09:22)  HR: 102 (20 Nov 2024 09:00) (78 - 103)  BP: 132/89 (20 Nov 2024 09:00) (120/83 - 156/105)  BP(mean): 106 (20 Nov 2024 09:00) (97 - 122)  RR: 16 (20 Nov 2024 09:00) (16 - 18)  SpO2: 96% (20 Nov 2024 09:00) (92% - 100%)      11-19-24 @ 07:01  -  11-20-24 @ 07:00  --------------------------------------------------------  IN: 1630 mL / OUT: 2010 mL / NET: -380 mL        Mode: standby    acetaminophen   Oral Liquid .. 650 milliGRAM(s) Oral every 6 hours PRN  acetylcysteine 10%  Inhalation 4 milliLiter(s) Inhalation every 8 hours  albuterol/ipratropium for Nebulization 3 milliLiter(s) Nebulizer every 8 hours  amLODIPine   Tablet 10 milliGRAM(s) Oral daily  artificial tears (preservative free) Ophthalmic Solution 1 Drop(s) Right EYE every 4 hours  chlorhexidine 0.12% Liquid 15 milliLiter(s) Oral Mucosa every 12 hours  chlorhexidine 2% Cloths 1 Application(s) Topical <User Schedule>  doxazosin 8 milliGRAM(s) Oral at bedtime  heparin   Injectable 5000 Unit(s) SubCutaneous every 8 hours  hydrochlorothiazide 12.5 milliGRAM(s) Oral daily  insulin lispro (ADMELOG) corrective regimen sliding scale   SubCutaneous every 6 hours  petrolatum Ophthalmic Ointment 1 Application(s) Both EYES two times a day  polyethylene glycol 3350 17 Gram(s) Oral two times a day  potassium phosphate / sodium phosphate Powder (PHOS-NaK) 1 Packet(s) Oral every 6 hours  sodium zirconium cyclosilicate 5 Gram(s) Oral daily      LABS:  Na: 137 (11-20 @ 04:35), 138 (11-19 @ 16:10), 141 (11-19 @ 05:30), 144 (11-18 @ 05:30)  K: 3.9 (11-20 @ 04:35), 4.4 (11-19 @ 16:10), 4.0 (11-19 @ 05:30), 4.5 (11-18 @ 05:30)  Cl: 105 (11-20 @ 04:35), 109 (11-19 @ 16:10), 112 (11-19 @ 05:30), 115 (11-18 @ 05:30)  CO2: 21 (11-20 @ 04:35), 18 (11-19 @ 16:10), 19 (11-19 @ 05:30), 20 (11-18 @ 05:30)  BUN: 51 (11-20 @ 04:35), 46 (11-19 @ 16:10), 50 (11-19 @ 05:30), 50 (11-18 @ 05:30)  Cr: 2.00 (11-20 @ 04:35), 1.78 (11-19 @ 16:10), 1.81 (11-19 @ 05:30), 1.80 (11-18 @ 05:30)  Glu: 105(11-20 @ 04:35), 118(11-19 @ 16:10), 146(11-19 @ 05:30), 109(11-18 @ 05:30)    Hgb: 10.6 (11-20 @ 04:35), 9.9 (11-19 @ 05:30), 9.9 (11-18 @ 05:30)  Hct: 33.7 (11-20 @ 04:35), 30.8 (11-19 @ 05:30), 31.8 (11-18 @ 05:30)  WBC: 6.78 (11-20 @ 04:35), 9.71 (11-19 @ 05:30), 9.07 (11-18 @ 05:30)  Plt: 218 (11-20 @ 04:35), 188 (11-19 @ 05:30), 179 (11-18 @ 05:30)      ABG - ( 18 Nov 2024 11:50 )  pH, Arterial: 7.38  pH, Blood: x     /  pCO2: 35    /  pO2: 148   / HCO3: 21    / Base Excess: -3.8  /  SaO2: 99.9

## 2024-11-21 LAB
ANION GAP SERPL CALC-SCNC: 13 MMOL/L — SIGNIFICANT CHANGE UP (ref 5–17)
BUN SERPL-MCNC: 58 MG/DL — HIGH (ref 7–23)
BUN SERPL-MCNC: 61 MG/DL — HIGH (ref 7–23)
CALCIUM SERPL-MCNC: 9.2 MG/DL — SIGNIFICANT CHANGE UP (ref 8.4–10.5)
CHLORIDE SERPL-SCNC: 105 MMOL/L — SIGNIFICANT CHANGE UP (ref 96–108)
CO2 SERPL-SCNC: 22 MMOL/L — SIGNIFICANT CHANGE UP (ref 22–31)
CREAT ?TM UR-MCNC: 71 MG/DL — SIGNIFICANT CHANGE UP
CREAT SERPL-MCNC: 2.14 MG/DL — HIGH (ref 0.5–1.3)
CREAT SERPL-MCNC: 2.26 MG/DL — HIGH (ref 0.5–1.3)
EGFR: 35 ML/MIN/1.73M2 — LOW
EGFR: 35 ML/MIN/1.73M2 — LOW
EGFR: 38 ML/MIN/1.73M2 — LOW
EGFR: 38 ML/MIN/1.73M2 — LOW
GLUCOSE SERPL-MCNC: 119 MG/DL — HIGH (ref 70–99)
HCT VFR BLD CALC: 34.6 % — LOW (ref 39–50)
HGB BLD-MCNC: 11.2 G/DL — LOW (ref 13–17)
MAGNESIUM SERPL-MCNC: 2.2 MG/DL — SIGNIFICANT CHANGE UP (ref 1.6–2.6)
MCHC RBC-ENTMCNC: 29.8 PG — SIGNIFICANT CHANGE UP (ref 27–34)
MCHC RBC-ENTMCNC: 32.4 G/DL — SIGNIFICANT CHANGE UP (ref 32–36)
MCV RBC AUTO: 92 FL — SIGNIFICANT CHANGE UP (ref 80–100)
NRBC # BLD: 0 /100 WBCS — SIGNIFICANT CHANGE UP (ref 0–0)
NRBC BLD-RTO: 0 /100 WBCS — SIGNIFICANT CHANGE UP (ref 0–0)
PHOSPHATE SERPL-MCNC: 4.4 MG/DL — SIGNIFICANT CHANGE UP (ref 2.5–4.5)
PLATELET # BLD AUTO: 229 K/UL — SIGNIFICANT CHANGE UP (ref 150–400)
POTASSIUM SERPL-MCNC: 4.4 MMOL/L — SIGNIFICANT CHANGE UP (ref 3.5–5.3)
POTASSIUM SERPL-SCNC: 4.4 MMOL/L — SIGNIFICANT CHANGE UP (ref 3.5–5.3)
RBC # BLD: 3.76 M/UL — LOW (ref 4.2–5.8)
RBC # FLD: 14.8 % — HIGH (ref 10.3–14.5)
SODIUM SERPL-SCNC: 140 MMOL/L — SIGNIFICANT CHANGE UP (ref 135–145)
SODIUM UR-SCNC: 79 MMOL/L — SIGNIFICANT CHANGE UP
UUN UR-MCNC: 763 MG/DL — SIGNIFICANT CHANGE UP
WBC # BLD: 5.88 K/UL — SIGNIFICANT CHANGE UP (ref 3.8–10.5)
WBC # FLD AUTO: 5.88 K/UL — SIGNIFICANT CHANGE UP (ref 3.8–10.5)

## 2024-11-21 PROCEDURE — 99232 SBSQ HOSP IP/OBS MODERATE 35: CPT

## 2024-11-21 RX ADMIN — ACETYLCYSTEINE 4 MILLILITER(S): 200 INHALANT RESPIRATORY (INHALATION) at 05:36

## 2024-11-21 RX ADMIN — Medication 1 DROP(S): at 06:07

## 2024-11-21 RX ADMIN — Medication 1 DROP(S): at 02:20

## 2024-11-21 RX ADMIN — Medication 1 APPLICATION(S): at 17:06

## 2024-11-21 RX ADMIN — Medication 15 MILLILITER(S): at 17:03

## 2024-11-21 RX ADMIN — HEPARIN SODIUM 5000 UNIT(S): 1000 INJECTION INTRAVENOUS; SUBCUTANEOUS at 22:51

## 2024-11-21 RX ADMIN — POLYETHYLENE GLYCOL 3350 17 GRAM(S): 17 POWDER, FOR SOLUTION ORAL at 06:04

## 2024-11-21 RX ADMIN — AMLODIPINE BESYLATE 10 MILLIGRAM(S): 10 TABLET ORAL at 06:05

## 2024-11-21 RX ADMIN — IPRATROPIUM BROMIDE AND ALBUTEROL SULFATE 3 MILLILITER(S): .5; 2.5 SOLUTION RESPIRATORY (INHALATION) at 05:36

## 2024-11-21 RX ADMIN — Medication 650 MILLIGRAM(S): at 07:15

## 2024-11-21 RX ADMIN — Medication 1 DROP(S): at 13:42

## 2024-11-21 RX ADMIN — IPRATROPIUM BROMIDE AND ALBUTEROL SULFATE 3 MILLILITER(S): .5; 2.5 SOLUTION RESPIRATORY (INHALATION) at 13:11

## 2024-11-21 RX ADMIN — ACETYLCYSTEINE 4 MILLILITER(S): 200 INHALANT RESPIRATORY (INHALATION) at 13:12

## 2024-11-21 RX ADMIN — Medication 1 APPLICATION(S): at 06:20

## 2024-11-21 RX ADMIN — Medication 15 MILLILITER(S): at 06:04

## 2024-11-21 RX ADMIN — DOXAZOSIN MESYLATE 8 MILLIGRAM(S): 8 TABLET ORAL at 22:52

## 2024-11-21 RX ADMIN — Medication 1 APPLICATION(S): at 06:13

## 2024-11-21 RX ADMIN — IPRATROPIUM BROMIDE AND ALBUTEROL SULFATE 3 MILLILITER(S): .5; 2.5 SOLUTION RESPIRATORY (INHALATION) at 21:02

## 2024-11-21 RX ADMIN — HEPARIN SODIUM 5000 UNIT(S): 1000 INJECTION INTRAVENOUS; SUBCUTANEOUS at 06:07

## 2024-11-21 RX ADMIN — Medication 1 DROP(S): at 11:09

## 2024-11-21 RX ADMIN — SODIUM ZIRCONIUM CYCLOSILICATE 5 GRAM(S): 5 POWDER, FOR SUSPENSION ORAL at 11:09

## 2024-11-21 RX ADMIN — ACETYLCYSTEINE 4 MILLILITER(S): 200 INHALANT RESPIRATORY (INHALATION) at 21:02

## 2024-11-21 RX ADMIN — Medication 1 DROP(S): at 22:52

## 2024-11-21 RX ADMIN — Medication 1 DROP(S): at 17:03

## 2024-11-21 RX ADMIN — HEPARIN SODIUM 5000 UNIT(S): 1000 INJECTION INTRAVENOUS; SUBCUTANEOUS at 13:42

## 2024-11-21 RX ADMIN — Medication 650 MILLIGRAM(S): at 06:31

## 2024-11-21 NOTE — PROGRESS NOTE ADULT - ASSESSMENT
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; respiratory failure s/p trach/peg readmit for hypoxia 2/2 presumed mucous plugging      NEURO:  NIHSS >25; poor functional recovery anticipated; family aware   -neuro check q4  -PT/OT following  -speech following   -PMNR following     PULMONARY:  tolerating trach collar w/ deflated cuff - CPAP qhs   Mucomyst & Duoneb to q8    CARDIOVASCULAR:  monitor on telemetry   vitals q1  sbp goal 100-160 ; diastolic pressure >80;c/w  HCTZ 12.5mg daily; c/w amlodipine 10mg     GASTROENTEROLOGY:  PEG in place; c/w tube feeds (nephro)  ensure BMs w/ Miralax & senna-lokelma w/ hold parameters for hyperkalemia 2/2 renal disease     RENAL/:  ANIKA on CKD   -c/w calcium acetate   -free water 250mg q6hrs   -check BMP qd  -strict i/o's ;  -Na goal 135-145  retaining requiring straight cath will c/w Cardura if continues to require straight cath will replace folry     ENDOCRINE:  Diabetes Mellitus  A1c- 5.3  Monitor FSG q6 hrs while w/ tube feeds   Hypothyroidism   TSH-1.23      HEME/ONC:  DVT ppx: heparin SQ  b/l SCDs    INFECTIOUS:  monitor off abx id sign off sputum culture Stenotrophomonas likely colonization       DISPO: recommended for IRON- (only Emergency Medicaid) pending RUCOL ; social work & CM following   stepdown tele unit

## 2024-11-21 NOTE — PROGRESS NOTE ADULT - TIME-BASED
Quality 130: Documentation Of Current Medications In The Medical Record: Current Medications Documented Quality 431: Preventive Care And Screening: Unhealthy Alcohol Use - Screening: Patient not identified as an unhealthy alcohol user when screened for unhealthy alcohol use using a systematic screening method Detail Level: Detailed Quality 47: Advance Care Plan: Advance care planning not documented, reason not otherwise specified. Quality 226: Preventive Care And Screening: Tobacco Use: Screening And Cessation Intervention: Patient screened for tobacco use and is an ex/non-smoker 33

## 2024-11-21 NOTE — PROGRESS NOTE ADULT - SUBJECTIVE AND OBJECTIVE BOX
INTERVAL HISTORY: HPI:  Unknown age male, unknown past medical history (reported stroke and MI by coworkers) presented to [facility] with AMS, Pt was working at [employer] when he was found down by coworkers. EMS called and pt brought to [facility] ED. Intubated, sedated, started on Cardene for SBPs in 200s. CT head showed brain stem bleed. Transferred to NSICU for further management.  (30 Sep 2024 12:55)    Drug Dosing Weight  Height (cm): 172.7 (30 Sep 2024 08:39)  Weight (kg): 80 (30 Sep 2024 09:04)  BMI (kg/m2): 26.8 (30 Sep 2024 09:04)  BSA (m2): 1.94 (30 Sep 2024 09:04)    PAST MEDICAL & SURGICAL HISTORY:  Acute myocardial infarction  CVA (cerebral vascular accident)    REVIEW OF SYSTEMS: [ ] Unable to Assess due to neurologic exam   [ ] All ROS addressed below are non-contributory, except:  Neuro: [ ] Headache [ ] Back pain [ ] Numbness [ ] Weakness [ ] Ataxia [ ] Dizziness [ ] Aphasia [ ] Dysarthria [ ] Visual disturbance  Resp: [ ] Shortness of breath/dyspnea, [ ] Orthopnea [ ] Cough  CV: [ ] Chest pain [ ] Palpitation [ ] Lightheadedness [ ] Syncope  Renal: [ ] Thirst [ ] Edema  GI: [ ] Nausea [ ] Emesis [ ] Abdominal pain [ ] Constipation [ ] Diarrhea  Hem: [ ] Hematemesis [ ] bright red blood per rectum  ID: [ ] Fever [ ] Chills [ ] Dysuria  ENT: [ ] Rhinorrhea    PHYSICAL EXAM:  General: No Acute Distress, trach collar   Neurological: eyes open spontaneously, tracks vertically, RUE wiggles fingers to commands,, b/l LE WD, LUE extension, RUE WD, pupils b/l 3mm reactive, cough gag (+)  Pulmonary: Clear to Auscultation, No Rales, No Rhonchi, No Wheezes   Cardiovascular: S1, S2, Regular Rate and Rhythm   Gastrointestinal: Soft, Nontender, Nondistended Extremities: No calf tenderness             ICU Vital Signs Last 24 Hrs  T(C): 37.8 (21 Nov 2024 09:01), Max: 38.3 (20 Nov 2024 22:20)  T(F): 100 (21 Nov 2024 09:01), Max: 100.9 (20 Nov 2024 22:20)  HR: 102 (21 Nov 2024 08:00) (98 - 114)  BP: 115/77 (21 Nov 2024 08:00) (108/78 - 137/101)  BP(mean): 88 (21 Nov 2024 08:00) (87 - 115)  ABP: --  ABP(mean): --  RR: 16 (21 Nov 2024 08:00) (16 - 20)  SpO2: 97% (21 Nov 2024 08:00) (92% - 98%)      11-20-24 @ 07:01  -  11-21-24 @ 07:00  --------------------------------------------------------  IN: 2440 mL / OUT: 1785 mL / NET: 655 mL        Mode: CPAP with PS, FiO2: 40, PEEP: 5, PS: 5, MAP: 6.7, PIP: 11    acetaminophen   Oral Liquid .. 650 milliGRAM(s) Oral every 6 hours PRN  acetylcysteine 10%  Inhalation 4 milliLiter(s) Inhalation every 8 hours  albuterol/ipratropium for Nebulization 3 milliLiter(s) Nebulizer every 8 hours  amLODIPine   Tablet 10 milliGRAM(s) Oral daily  artificial tears (preservative free) Ophthalmic Solution 1 Drop(s) Right EYE every 4 hours  chlorhexidine 0.12% Liquid 15 milliLiter(s) Oral Mucosa every 12 hours  chlorhexidine 2% Cloths 1 Application(s) Topical <User Schedule>  doxazosin 8 milliGRAM(s) Oral at bedtime  heparin   Injectable 5000 Unit(s) SubCutaneous every 8 hours  hydrochlorothiazide 12.5 milliGRAM(s) Oral daily  insulin lispro (ADMELOG) corrective regimen sliding scale   SubCutaneous every 6 hours  petrolatum Ophthalmic Ointment 1 Application(s) Both EYES two times a day  polyethylene glycol 3350 17 Gram(s) Oral two times a day  sodium zirconium cyclosilicate 5 Gram(s) Oral daily      LABS:  Na: 140 (11-21 @ 05:30), 137 (11-20 @ 04:35), 138 (11-19 @ 16:10), 141 (11-19 @ 05:30)  K: 4.4 (11-21 @ 05:30), 3.9 (11-20 @ 04:35), 4.4 (11-19 @ 16:10), 4.0 (11-19 @ 05:30)  Cl: 105 (11-21 @ 05:30), 105 (11-20 @ 04:35), 109 (11-19 @ 16:10), 112 (11-19 @ 05:30)  CO2: 22 (11-21 @ 05:30), 21 (11-20 @ 04:35), 18 (11-19 @ 16:10), 19 (11-19 @ 05:30)  BUN: 58 (11-21 @ 05:30), 51 (11-20 @ 04:35), 46 (11-19 @ 16:10), 50 (11-19 @ 05:30)  Cr: 2.26 (11-21 @ 05:30), 2.00 (11-20 @ 04:35), 1.78 (11-19 @ 16:10), 1.81 (11-19 @ 05:30)  Glu: 119(11-21 @ 05:30), 105(11-20 @ 04:35), 118(11-19 @ 16:10), 146(11-19 @ 05:30)    Hgb: 11.2 (11-21 @ 05:30), 10.6 (11-20 @ 04:35), 9.9 (11-19 @ 05:30)  Hct: 34.6 (11-21 @ 05:30), 33.7 (11-20 @ 04:35), 30.8 (11-19 @ 05:30)  WBC: 5.88 (11-21 @ 05:30), 6.78 (11-20 @ 04:35), 9.71 (11-19 @ 05:30)  Plt: 229 (11-21 @ 05:30), 218 (11-20 @ 04:35), 188 (11-19 @ 05:30)

## 2024-11-21 NOTE — PROGRESS NOTE ADULT - SUBJECTIVE AND OBJECTIVE BOX
HPI:  Unknown age male, unknown past medical history (reported stroke and MI by coworkers) presented to [facility] with AMS, Pt was working at [employer] when he was found down by coworkers. EMS called and pt brought to [facility] ED. Intubated, sedated, started on cardene for SBPs in 200s. CT head showed brain stem bleed. Transferred to NSICU for further management.  (30 Sep 2024 12:55)    INTERVAL EVENTS: BD 52, GEORGE ovn, tolerating CPAP 5/5     Hospital course:   9/30: transferred from [facility]. A line placed. Versed dc'd. Roomate Prasanth at bedside, states pt has family in [place], cannot confirm medications or PMH other than stroke and MI. 250cc bolus 3% given. LR switched to NS. hydralazine 25q8 started, 3% started, switched propofol to precedex   10/1: stability CTH done. Added labetalol, started TF. Palliative consulted. ethics consulted to determine surrogate. febrile 103, pan cx sent  10/2: BD 2, GEORGE overnight. TF resumed. Desatt'd to 80s, FiO2 inc. to 50. Fentanyl given, ABG, CXR ordered. Maxxed on precedex, started on propofol for DARIEN -4 - -5. Precedex dc'd. Duonebs, mucomyst, hypertonic added. 3% dc'd. Cardene dc'd. Start vanc/CTX. Increased labetalol 200q8. MRSA negative, dc'd vanc. ETT pulled back 2cm x 2, good positioning after confirmatory chest xray. Ethics attempting to establish HCP with family. Na 159, starting FW 250q6 for range 150-155.   10/3: BD3, GEORGE o/n, neuro stable. Na elevating, FW increased to 300q6. Dc'd bowel reg for diarrhea. vEEG started. SQH 5000q8 tonight.   10/4: BD 4, albumn bolus, incr. LR to 80 2/2 incr. in Cr, LR to 100cc/hr for uptrending Cr. Started 7% hypersal for 48hrs and SL atropine for inline/oral thick secretions. Dc'd CTX and started ancef for MSSA in the sputum. Nephrology consulted for CKD, f/u recs. SBP 170s, given hydralazine 10mg IVP.   10/5: BD5, o/n 10mg IVP hydralazine given for SBP 170s and started on hydralazine 25q8 via OGT. 10mg IV push labetalol for SBP > 160s. RT placed for diarrhea.   10/6: BD6, o/n FW increased to 350q4 per nephrology recs. IV tylenol for temp 100.6, SBp 160s presumed uncomfortable.   10/7: BD7, overnight pancultured for temp 101.8F.   10/8: BD8. GEORGE. Cr bumped. decreased LR to 75cc/hr. Adding simethicone ATC. incr hydralazine 50mgTID. Incr labetalol 300mgTID. Na 145, decreased FWF to 250q6. Start precedex. FENa consistent with intrinsic kidney injury. Pend repeat renal US. Retaining up to 1.3L, bladder scans q6, straight cath PRN  10/9: BD 9. GEORGE overnight. Neuro stable. abd xray for distention w non-specific gas pattern, OGT to LIWS for morning. duonebs/mucomyst to q8 for improving secretions. Changed tube feeds to Jevity 1.5 20cc/hr, low rate due to abdominal distention, nepro dense and more difficult to digest. Tolerating CPAP, confirmed by ABG.   10/10: BD 10. GEORGE overnight. Neuro stable. (+) gabriel for urinary retention on bladder scan. inc TF to goal rate of 40cc/hr. family leaning toward pursuing trach/PEG. 1/2 amp for FS 81.   10/11: BD 11. GEORGE overnight. Neuro stable. Trach/PEG consults placed.   10/12: BD 12. GEORGE overnight. Neuro stable. MRI brain complete.   10/13: BD 13. Increase flomax. Hold SQH after PM dose for trach tm. IVL.   10/14: BD 14. GEORGE overnight, remains on AC/VC. Gabriel placed for urinary retention. Dc'd free water.  S/p trach with pulm. NGT placed and CXR confirmed in good position.   10/15: BD 15, GEORGE ovn. resumed feeds. spiked 101, pan cx sent.   10/16: BD 16. GEORGE ovn. Lokelma 5mg for K+ 5.4. Started vanc q 24/zosyn for empiric PNA coverage, IVF to 100/hr. PEG held for fever.   10/17: BD 17,  ordered serum osm and urine osm for am. Started sinemet for neurostimulation. Increased cardura to 0.8. Started FW 100q4, dc'd IVF. MRSA negative, dc'd vanc. NGT replaced d/t coiling.   10/18: BD 18, GEORGE overnight, neuro stable. Amantadine added for neurostim. zosyn changed to unasyn for acinetobacter baumannii, failed TOV and required SC  10/19: BD 19, GEORGE ovn. cardura 2mg added for retention. labetalol decreased 200q8, hydralazine decreased 25q8. Gabriel replaced.   10/20: BD20, GEORGE overnight. NGT dislodged, replaced. PEG tomorrow w/ gen surg, FW increased to 150q4 and labetalol decreased to 100q8, lokelma given for hyperkalemia.   10/21: BD 21. POD0 PEG placement with Gen surg. decr labetolol to 50q8, incr. cardura to 0.4, started lokelma and phoslo, dc gabriel POD0 PEG placement with Gen surg.  10/22: BD 22. Plan to start TF today via PEG. dc labetalol, Following ophtho recs. Increased apnea settings - found to be in cheyne-moe respiration. CPAP 5/5.  10/23: BD 23. hydralazine d/c'd, trach collar trial today. Rectal tube placed at 6am.  10/24: BD24, o/n lokelma held due to diarrhea. Free water 100q6 resumed. dc'd tamsulosin, amantadine. Incr'd cardura to 8mg qhs. Dc'd FW. Switched jevity to nepro. gabriel placed for high urine output. Started SL atropine for oral secretions. Dc'd free water.  10/25: BD25, o/n decreased suctioning requirements to > q4hrs, GEORGE. Cr improving, cont phoslo, lokelma held at this time. Gabriel placed yest, cont. Tolerating trach collar. Given 500cc plasmalyte bolus for ANIKA. Dc'd sinemet.   10/26: BD26, o/n resumed lokelma 5mg daily and resumed 100cc free water q6hrs. Change in neuro status with new right pupillary dilation with anisocoria (right pupil 6mm fixed and left pupil 3mm briskly reactive). Given 23.4% NaCl bullet, taken for emergent CTH showing mostly resolved pontine hemorrhage, continued brainstem hypodensity likely edema d/t hemorrhage, no new hemorrhage or infarct, no herniation, mild increase in size of left lateral ventricle. Vitals remaine stable. Na goal > 140.   10/27: BD27, o/n GEORGE.Neuro stable. Pend stepdown with airway bed.   10/28: BD 28. GEORGE overnight. Neuro stable. Miralax ordered. Gabriel removed, pending TOV.  10/29: BD 29. GEORGE o/n. Given 2L NS over 8 hrs for increased BUN/Cr ratio. Gabriel placed for frequent straight cath.   10/30: BD 30.   10/31: BD 31. GEORGE overnight. Na 149, increased free water to 200q6. 1L NS for uptrending BUN.   11/1: BD 32. GEORGE overnight. Given 1L NS for dehydration. Na 146, increased FW to 250q6.   11/2: BD 33 GEORGE overnight, neuro stable, given 500cc bolus for net negative status and tachycardia   11/3: BD 34, GEORGE overnight, neuro stable. Patient remains tachycardic, EKG showing sinus tachycardia, given additional 500cc NS bolus. Febrile to 101.9F, pan cultured (without UA), CXR WNL, given tylenol.   11/4: BD 35, GEORGE overnight, neuro stable. Given 1L NS for tachycardia. sputum (+) for stenotropohomas maltophilia.   11/5: BD 36 GEORGE overnight, neuro stable. Vancomycin dc'd. Chest PT BID. ID consulted, cont zosyn.  11/6: BD 37. blood cx + klebsiella dc zosyn changed to cefepime, CTAP ordered, rpt blood cx sent.    11/7: BD 38. Pending CT A/P, given 250cc bolus and starting maintenance fluids overnight. Pending CT A/P after bolus   11/8: BD 39. CT CAP negative for infection.   11/9: BD 40. GEORGE overnight.  11/10: BD 41. GEORGE overnight. desat to 85 on trach collar, O2 inc to 10L and 100%, O2 sat inc to 95. pt tachy to 110s, euvolemic. given tylenol. ABG and CXR ordered. spiked fever, pancultured, RVP negative. AM ABG w pO2 79, rpt w pO2 79. pt appears comfortable, satting 94%.   11/11: BD 42. GEORGE overnight. pt became tachy to 130s, desat to 90 on 100% FiO2 and 10L. suctioned, (+) productive cough. temp 101.4, given 1g IV tylenol and 500cc NS bolus for euvolemia. fever and HR downtrending. LE dopplers negative for dvt  11/12: BD 43, GEORGE ovn, fever and HR downtrending, satting 97% 70% FIO2  11/13: BD 44, GEORGE ovn. started standing tylenol x24 hours for tachycardia. desat to 80s, o2 increased. CXR stable, pending CTA PE protocol.   11/14: BD 45, GEORGE overnight, neuro stable. resp therapy dec FiO2 to 70%.   11/15: BD 46, GEORGE overnight, neuro stable.  Rapid called for desaturation 30s, tachycardic 140s. Patient bagged, 100% fio2, heavily suctioned. CXR/POCUS unremarkable. ABG c/w desaturation. WBC 14.71. Afebrile. O2 improved to 90s and patient upgraded to ICU. ABG paO2 30s improved to 89 on vent. IV Tylenol x 1, sputum sent. Start protonix while o-n vent.   11/16: BD 47. POCUS showed collapsable IVCF, given 1L bolus. Vanco/Cefpime added empriic for PNA, NGT feeds restarted. MRSA swab neg, Vanc DC'd.   11/17: BD 48. GEORGE overnight. 1l bolus for tachycardia. Spiked to 101, cultured. 500cc bolus for tachycardia, tachy to 148 given 25mcg fentanyl, 250cc albumin, 1.5L bolus. 5 IV lopressor with response HR to 100s. +Stenotrophomonas on sputum cx.   11/18: BD 49. GEORGE overnight. Consulted ID, cefepime switched to bactrim x7days. Started hydrochlorothiazine 12.5mg daily.  11/19: BD 50. Tachy 120s, given tylenol and 500cc NS. Tolerating 5/5, switched to TCx. Holding phos binder. D/c Bactrim. D/c gabriel, f/u TOV. Dc'd PPI.   11/20: BD 51. GEORGE ovn. 1600 satting low 90s, mildly tachy to 110s, afebrile, RR wnl. O2 improved to mid 90s while inc O2 to 100% on TCx. CPAP 5/5 placed back on.  11/21: BD 52, GEORGE ovn, tolerating CPAP 5/5     Vital Signs Last 24 Hrs  T(C): 37.8 (21 Nov 2024 01:01), Max: 38.3 (20 Nov 2024 22:20)  T(F): 100 (21 Nov 2024 01:01), Max: 100.9 (20 Nov 2024 22:20)  HR: 104 (21 Nov 2024 02:00) (96 - 114)  BP: 116/78 (21 Nov 2024 02:00) (116/78 - 139/89)  BP(mean): 92 (21 Nov 2024 02:00) (92 - 115)  RR: 18 (21 Nov 2024 02:00) (16 - 20)  SpO2: 98% (21 Nov 2024 02:00) (92% - 98%)    Parameters below as of 21 Nov 2024 02:00  Patient On (Oxygen Delivery Method): ventilator, CPAP    O2 Concentration (%): 40    I&O's Summary    19 Nov 2024 07:01  -  20 Nov 2024 07:00  --------------------------------------------------------  IN: 1630 mL / OUT: 2010 mL / NET: -380 mL    20 Nov 2024 07:01  -  21 Nov 2024 02:46  --------------------------------------------------------  IN: 1220 mL / OUT: 985 mL / NET: 235 mL        PHYSICAL EXAM:  Gen: No acute distress, resting comfortably in bed  Skin: Warm and dry. No rashes or lesions.  HEENT: Head without asymmetry or deformity. PERRL 3mm b/l, vertical EOM, tracts vertically, spontaneously opens eyes, no conjunctival redness, sclera is clear, hearing intact to whisper test bilaterally, nares patent bilaterally.  Lungs: +trach to vent, breathing is non-labored, symmetrical chest wall movement, lung sounds are clear bilaterally without rales, rhonchi, or wheezing.   Heart: Regular rate, S1, and S2 without murmur or gallop  Abd: +PEG. Abdomen is soft, non-distended. Normal bowel sounds on auscultation. No masses, hepatomegaly, or splenomegaly appreciated.  Peripheral vascular: Capillary refill <2 seconds; bilateral radial, posterior tibialis, and dorsalis pedis pulses 2/4.  Neuro: A&Ox0, intermittently follows commands, smiles on command, CN II-XII grossly intact; able to show 2 fingers intermittently on commands, squeezes fingers.  LUE withdraws to noxious stimuli; RLE and LLE withdraws from noxious stimuli.   Wounds/Drains: PEG and trach sites clean without surrounding erythema or purulent drainage.    LABS:                        10.6   6.78  )-----------( 218      ( 20 Nov 2024 04:35 )             33.7     11-20    137  |  105  |  51[H]  ----------------------------<  105[H]  3.9   |  21[L]  |  2.00[H]    Ca    9.4      20 Nov 2024 04:35  Phos  2.7     11-20  Mg     2.1     11-20        Urinalysis Basic - ( 20 Nov 2024 04:35 )    Color: x / Appearance: x / SG: x / pH: x  Gluc: 105 mg/dL / Ketone: x  / Bili: x / Urobili: x   Blood: x / Protein: x / Nitrite: x   Leuk Esterase: x / RBC: x / WBC x   Sq Epi: x / Non Sq Epi: x / Bacteria: x          CAPILLARY BLOOD GLUCOSE      POCT Blood Glucose.: 140 mg/dL (20 Nov 2024 23:40)  POCT Blood Glucose.: 115 mg/dL (20 Nov 2024 17:30)  POCT Blood Glucose.: 104 mg/dL (20 Nov 2024 11:43)  POCT Blood Glucose.: 106 mg/dL (20 Nov 2024 11:33)  POCT Blood Glucose.: 105 mg/dL (20 Nov 2024 06:27)      Drug Levels: [] N/A    CSF Analysis: [] N/A      Allergies    Allergy Status Unknown    Intolerances        Home Medications:      MEDICATIONS:  MEDICATIONS  (STANDING):  acetylcysteine 10%  Inhalation 4 milliLiter(s) Inhalation every 8 hours  albuterol/ipratropium for Nebulization 3 milliLiter(s) Nebulizer every 8 hours  amLODIPine   Tablet 10 milliGRAM(s) Oral daily  artificial tears (preservative free) Ophthalmic Solution 1 Drop(s) Right EYE every 4 hours  chlorhexidine 0.12% Liquid 15 milliLiter(s) Oral Mucosa every 12 hours  chlorhexidine 2% Cloths 1 Application(s) Topical <User Schedule>  doxazosin 8 milliGRAM(s) Oral at bedtime  heparin   Injectable 5000 Unit(s) SubCutaneous every 8 hours  hydrochlorothiazide 12.5 milliGRAM(s) Oral daily  insulin lispro (ADMELOG) corrective regimen sliding scale   SubCutaneous every 6 hours  petrolatum Ophthalmic Ointment 1 Application(s) Both EYES two times a day  polyethylene glycol 3350 17 Gram(s) Oral two times a day  sodium zirconium cyclosilicate 5 Gram(s) Oral daily    MEDICATIONS  (PRN):  acetaminophen   Oral Liquid .. 650 milliGRAM(s) Oral every 6 hours PRN Temp greater or equal to 38C (100.4F), Mild Pain (1 - 3)      CULTURES:  Culture Results:   No growth at 72 Hours (11-17 @ 05:59)  Culture Results:   Mixed gram negative rods including  Moderate Stenotrophomonas maltophilia  Commensal iram consistent with body site (11-16 @ 01:53)      RADIOLOGY & ADDITIONAL TESTS:      ASSESSMENT:  43 y/o PMH ?stroke/MI present to [facility] after collapsing at work. Decorticate posturing, vomiting, intubated for airway protection. 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 Transferred to [facility] for further management. s/p trach 10/14. s/p peg 10/21. Re-upgrade to ICU 2/2 desaturation event and suctioning requirements 11/15.     PLAN:  Neuro:  - neuro q4/vitals q2  - pain control: tylenol prn  - vEEG (10/3-4)- negative, (10/17-10/19) - negative  - CTH 9/30: enlarged pontine hemorrhage, CTH 10/3: stable, CTH 10/25 anisocoric pupils showing mostly resolved pontine hemorrhage, no new hemorrhage or infarct, no herniation  - MRI brain 10/12: parenchymal hemorrhage, acute/subacute R cerebellar stroke    - stroke core measures, stroke neuro signed off    CV:  - -160  - HTN: amlodipine 10 mg qd, HCTZ 12.5 daily  - echo (9/30) EF 75%    Resp:  - Trach collar 11/19  - Secretions: duonebs/mucomyst/chest PT q8h   - CTA chest 11/13 negative for PE, inc ground glass opacities    GI:  - TF via PEG (placed 10/21 by gen surg)  - bowel regimen, last BM 11/20  - CTAP 11/8 negative for infection      Renal:  - IVL  - FW flushes 250cc q6hrs   - hyperK 10/20: lokelma 5mg qd  - f/u TOV (gabriel d/c 11/19)  - Urinary retenion: Cardura 8 mg   - CKD: trend BUN/Cr  - renal US 10/1: echogenicity c/w chronic med renal dz, repeat 10/8: inc renal echogeicity, c/f medical renal disease w increased hydro     Endo:  - ISS  -  A1c 5.4    Heme:  - DVT ppx: SCDs, SQH 5000u q8h   - LE dopplers negative 11/11    ID:  - febrile, last pancx 11/16  -  s/p Stenotrophomonas maltophilia PNA: s/p Bactrim (11/18-11/19) s/p Cefepime (11/16-11/18)  - MRSA swab neg 11/16   - pan cultured 11/3, (+) sputum for stenotrophomas maltophlia, blood cx (+) klebsiella, cefepime 2gq12 (11/6 - 11/12)   - empiric tx: s/p zosyn (11/3-11/6) , s/p vanc  (11/3- 11/5)  - S/p Ancef (10/4-10/14) for PNA, and s/p Unasyn (10/18-10/23) +actinobacter baumanii    MISC:  - ophtho consult for keratitis, s/p erythromycin ointment to rt eye q4hrs, ofloxacin ointment to rt eye QID, artificial tears to rt eye q2hrs, moisture chamber at bedtime    Dispo: telemetry, full code, pending placement and emergency medicaid     D/w Dr. D'Amico and Abdirizak     Assessment: present when checked     [] GCS   E   V   M     Heart Failure: [] Acute, [] acute on chronic, [] chronic   Heart Failure: [] Diastolic (HFpEF), [] Systolic (HRrEF), [] Combined (HFpEF and HFrEF), [] RHF, [] Pulm HTN, [] Other     [] ANIKA, [] ATN, [] AIN, [] other   [] CKD1, [] CKD2, [] CKD3, [] CKD4, [] CKD5, [] ESRD     Encephalopathy: [] Metabolic, [] Hepatic, [] Toxic, [] Neurological, [] Other     Abnormal Nutritional Status: [] malnutrition (see nutrition note), []underweight: BMI <19, [] morbid obesity: BMI >40, [] Cachexia     [] Sepsis   [] Hypovolemic shock, [] Cardiogenic shock, [] Hemorrhagic shock, [] Neurogenic shock   [] Acute respiratory failure   [] Cerebral edema, [] Brain compression / herniation   [] Functional quadriplegia   [] Acute blood loss anemia  no back pain, no gout, no musculoskeletal pain, no neck pain.

## 2024-11-22 LAB
ANION GAP SERPL CALC-SCNC: 14 MMOL/L — SIGNIFICANT CHANGE UP (ref 5–17)
BASE EXCESS BLDA CALC-SCNC: -0.3 MMOL/L — SIGNIFICANT CHANGE UP (ref -2–3)
BASE EXCESS BLDA CALC-SCNC: -2 MMOL/L — SIGNIFICANT CHANGE UP (ref -2–3)
BUN SERPL-MCNC: 76 MG/DL — HIGH (ref 7–23)
CALCIUM SERPL-MCNC: 9.4 MG/DL — SIGNIFICANT CHANGE UP (ref 8.4–10.5)
CHLORIDE SERPL-SCNC: 104 MMOL/L — SIGNIFICANT CHANGE UP (ref 96–108)
CO2 BLDA-SCNC: 23 MMOL/L — SIGNIFICANT CHANGE UP (ref 19–24)
CO2 BLDA-SCNC: 25 MMOL/L — HIGH (ref 19–24)
CO2 SERPL-SCNC: 20 MMOL/L — LOW (ref 22–31)
CREAT SERPL-MCNC: 2.33 MG/DL — HIGH (ref 0.5–1.3)
CULTURE RESULTS: SIGNIFICANT CHANGE UP
EGFR: 34 ML/MIN/1.73M2 — LOW
EGFR: 34 ML/MIN/1.73M2 — LOW
GLUCOSE SERPL-MCNC: 132 MG/DL — HIGH (ref 70–99)
HCO3 BLDA-SCNC: 22 MMOL/L — SIGNIFICANT CHANGE UP (ref 21–28)
HCO3 BLDA-SCNC: 24 MMOL/L — SIGNIFICANT CHANGE UP (ref 21–28)
HCT VFR BLD CALC: 35.6 % — LOW (ref 39–50)
HGB BLD-MCNC: 11.2 G/DL — LOW (ref 13–17)
MAGNESIUM SERPL-MCNC: 2.4 MG/DL — SIGNIFICANT CHANGE UP (ref 1.6–2.6)
MCHC RBC-ENTMCNC: 28.6 PG — SIGNIFICANT CHANGE UP (ref 27–34)
MCHC RBC-ENTMCNC: 31.5 G/DL — LOW (ref 32–36)
MCV RBC AUTO: 91 FL — SIGNIFICANT CHANGE UP (ref 80–100)
NRBC # BLD: 0 /100 WBCS — SIGNIFICANT CHANGE UP (ref 0–0)
NRBC BLD-RTO: 0 /100 WBCS — SIGNIFICANT CHANGE UP (ref 0–0)
PCO2 BLDA: 36 MMHG — SIGNIFICANT CHANGE UP (ref 35–48)
PCO2 BLDA: 38 MMHG — SIGNIFICANT CHANGE UP (ref 35–48)
PH BLDA: 7.4 — SIGNIFICANT CHANGE UP (ref 7.35–7.45)
PH BLDA: 7.41 — SIGNIFICANT CHANGE UP (ref 7.35–7.45)
PHOSPHATE SERPL-MCNC: 4.9 MG/DL — HIGH (ref 2.5–4.5)
PLATELET # BLD AUTO: 226 K/UL — SIGNIFICANT CHANGE UP (ref 150–400)
PO2 BLDA: 108 MMHG — SIGNIFICANT CHANGE UP (ref 83–108)
PO2 BLDA: 81 MMHG — LOW (ref 83–108)
POTASSIUM SERPL-MCNC: 4.4 MMOL/L — SIGNIFICANT CHANGE UP (ref 3.5–5.3)
POTASSIUM SERPL-SCNC: 4.4 MMOL/L — SIGNIFICANT CHANGE UP (ref 3.5–5.3)
RBC # BLD: 3.91 M/UL — LOW (ref 4.2–5.8)
RBC # FLD: 14.8 % — HIGH (ref 10.3–14.5)
SAO2 % BLDA: 97.5 % — SIGNIFICANT CHANGE UP (ref 94–98)
SAO2 % BLDA: 99.3 % — HIGH (ref 94–98)
SODIUM SERPL-SCNC: 138 MMOL/L — SIGNIFICANT CHANGE UP (ref 135–145)
SPECIMEN SOURCE: SIGNIFICANT CHANGE UP
WBC # BLD: 6.53 K/UL — SIGNIFICANT CHANGE UP (ref 3.8–10.5)
WBC # FLD AUTO: 6.53 K/UL — SIGNIFICANT CHANGE UP (ref 3.8–10.5)

## 2024-11-22 PROCEDURE — 99232 SBSQ HOSP IP/OBS MODERATE 35: CPT

## 2024-11-22 RX ADMIN — HEPARIN SODIUM 5000 UNIT(S): 1000 INJECTION INTRAVENOUS; SUBCUTANEOUS at 13:00

## 2024-11-22 RX ADMIN — ACETYLCYSTEINE 4 MILLILITER(S): 200 INHALANT RESPIRATORY (INHALATION) at 05:01

## 2024-11-22 RX ADMIN — Medication 15 MILLILITER(S): at 05:01

## 2024-11-22 RX ADMIN — Medication 1 DROP(S): at 06:14

## 2024-11-22 RX ADMIN — ACETYLCYSTEINE 4 MILLILITER(S): 200 INHALANT RESPIRATORY (INHALATION) at 21:10

## 2024-11-22 RX ADMIN — DOXAZOSIN MESYLATE 8 MILLIGRAM(S): 8 TABLET ORAL at 22:58

## 2024-11-22 RX ADMIN — HEPARIN SODIUM 5000 UNIT(S): 1000 INJECTION INTRAVENOUS; SUBCUTANEOUS at 22:58

## 2024-11-22 RX ADMIN — IPRATROPIUM BROMIDE AND ALBUTEROL SULFATE 3 MILLILITER(S): .5; 2.5 SOLUTION RESPIRATORY (INHALATION) at 05:00

## 2024-11-22 RX ADMIN — Medication 15 MILLILITER(S): at 17:26

## 2024-11-22 RX ADMIN — SODIUM ZIRCONIUM CYCLOSILICATE 5 GRAM(S): 5 POWDER, FOR SUSPENSION ORAL at 11:08

## 2024-11-22 RX ADMIN — Medication 1 APPLICATION(S): at 18:02

## 2024-11-22 RX ADMIN — IPRATROPIUM BROMIDE AND ALBUTEROL SULFATE 3 MILLILITER(S): .5; 2.5 SOLUTION RESPIRATORY (INHALATION) at 21:10

## 2024-11-22 RX ADMIN — Medication 1 APPLICATION(S): at 06:14

## 2024-11-22 RX ADMIN — Medication 667 MILLIGRAM(S): at 11:08

## 2024-11-22 RX ADMIN — AMLODIPINE BESYLATE 10 MILLIGRAM(S): 10 TABLET ORAL at 05:01

## 2024-11-22 RX ADMIN — Medication 1 DROP(S): at 17:26

## 2024-11-22 RX ADMIN — IPRATROPIUM BROMIDE AND ALBUTEROL SULFATE 3 MILLILITER(S): .5; 2.5 SOLUTION RESPIRATORY (INHALATION) at 15:57

## 2024-11-22 RX ADMIN — Medication 1 APPLICATION(S): at 11:23

## 2024-11-22 RX ADMIN — Medication 1 DROP(S): at 11:08

## 2024-11-22 RX ADMIN — Medication 1 DROP(S): at 13:00

## 2024-11-22 RX ADMIN — HEPARIN SODIUM 5000 UNIT(S): 1000 INJECTION INTRAVENOUS; SUBCUTANEOUS at 05:01

## 2024-11-22 RX ADMIN — ACETYLCYSTEINE 4 MILLILITER(S): 200 INHALANT RESPIRATORY (INHALATION) at 15:56

## 2024-11-22 NOTE — PROGRESS NOTE ADULT - ASSESSMENT
44 year old male with functional, ADL, cognitive, and speech impairments in the setting of brainstem hemorrhage, s/p trach and PEG.     Brainstem hemorrhage   Respiratory dysfunction s/p Trach  Dysphagia s/p PEG  HTN - amlodipine, HCTZ  Urinary retention - +gabriel    PLAN / RECOMMENDATIONS:     # Rehab / Mobilization:   - Initial therapy assessment: [X] PT  [X] OT   - Continue skilled therapy while admitted to prevent secondary complications of immobility focus on transfer training, bed mobility, progressive ambulation, and equipment evaluation.   - Educated patient and family members on post-acute rehabilitation. Discussed anticipated rehab course.  - Encouraged HOB elevation to at least 30-40 deg to prevent orthostasis   - nursing to reposition q2 turning to prevent skin breakdown given limited mobility   - Keep extremities elevated on pillows to assist with edema and positioning.   - Ensure proper positioning of neck to midline to avoid torticollis   - Therapy precautions: falls, orthostasis, hypoxia    # Bracing/Splinting:   - Z-Flow multi-podus boots for positioning/contracture prevention  - Consider resting hand splints    # Pain Management:   - Avoid/ monitor use sedating medications that may interfere with cognitive recovery   - Current pain regimen reviewed    # Speech/ Swallow:   - Dysphagia screen [X]  - SLP consult for swallow function evaluation and treatment, evaluate for PMV  - Diet:  NPO, continuous feeds through PEG    # GI/ Bowel:  - Continue to monitor bowel patterns.   - Bowel Regimen: miralax    # / Bladder: + gabriel  - Continue to monitor bladder patterns, avoid constipation.       # DVT Prophylaxis:   - SCDs, chemoprophylaxis - heparin    # Disposition optimization:     - Disposition recommendation - Inpatient Rehabilitation (AR vs IRON)  - Showing some neurologic improvement and command following. If continues to make functional gains, consider acute inpatient rehab placement  - Care coordination working on emergency medicaid

## 2024-11-22 NOTE — PROGRESS NOTE ADULT - SUBJECTIVE AND OBJECTIVE BOX
INTERVAL COURSE / SUBJECTIVE:  [facility] Nursing staff available to assist with American translation. He is awake and alert, able to squeeze right hand on command. Worked with PT/OT yesterday and able to tolerate sitting in chair. Also working with SLP and noting improved cognition.     -----------------------------------------------------------------------  REVIEW OF SYSTEMS:  Constitutional - No fever,  No fatigue  Neurological - stable  Pain - denies  Bowel - 11/21  Bladder - +gabriel  -----------------------------------------------------------------------  FUNCTIONAL PROGRESS:    Physical Therapy: 11/21    Cognitive/Neuro/Behavioral  Level of Consciousness: alert  Arousal Level: arouses to voice  Orientation: person;  place;  time;  responds via R hand squeeze and vertical eye movements (up/down)   Speech: trached  Mood/Behavior: calm;  cooperative    Language Assistance  Preferred Language to Address Healthcare Preferred Language to Address Healthcare: English    Therapeutic Interventions      Bed-Chair Transfer Training  Transfer Training Bed-to-Chair Transfer: dependent (less than 25% patient effort);  2 person assist;  +1 person for line management ;  weight-bearing as tolerated   lateral transfer with hover mat   Transfer Training Chair-to-Bed Transfer: NT; Pt left sitting in bedside chair   Bed-to-Chair Transfer Training Transfer Safety Analysis: decreased balance;  decreased flexibility;  decreased strength;  impaired balance;  impaired postural control;  impaired motor control;  dec aerobic capacity/endurance ;  lateral transfer with hover mat and 2 person assist +1 person for line management     Therapeutic Exercise  Therapeutic Exercise Detail: Pt sat up in bedside chair ~30 minutes to participate in head/neck control exercises, grooming tasks, and speech therapy. Pt demonstrates improved head neck control this session and is able to maintain neutral head posture in chair with 2-3 episodes of loss of control requiring Elayne for recovery. Pt benefits from tactile cues on head/neck region for neck muscle activation.     Neuromuscular Re-education  Neuromuscular Re-education Detail: Tone Assessment: Mile triceps tone in b/l UE, +R wrist clonus, +L ankle jerkUE MMT: b/l shoulders and elbows 0/5, R hand  2/5, L hand  1/5LE MMT: trace movement in the L quads, +R toe wigglePt able to open mouth on command, but unable to move tongue on command. Spontaneous cervical rotation R>L observed when SLP performed oral care       Occupational Therapy: 11/22    Bed-Chair Transfer Training  Transfer Training Bed-to-Chair Transfer: dependent (less than 25% patient effort);  lateral transfer with hover mat;  +3rd person for line management;  verbal cues;  nonverbal cues (demo/gestures);  1 person assist  Bed-to-Chair Transfer Training Transfer Safety Analysis: decreased flexibility;  decreased ROM;  decreased strength;  impaired balance;  impaired coordination;  impaired motor control;  impaired postural control;  cognitive, decreased safety awareness    Therapeutic Exercise  Therapeutic Exercise Detail: Pt dependent for lateral transfer from bed>chair. Pt tolerated sitting in chair ~30 minutes throughout session, pt able to maintain neck in neutral with 2-3 moments of poor neck control requiring Elayne to recover to neutral.     Neuromuscular Re-education  Neuromuscular Re-education Detail: MMT: B shoulders 0/5, B elbows 0/5- b elbows with triceps tone, L hand 1/5, R hand 2-/5, R wrist (+) clonus. LLE no tone noted however pt with L ankle jerk and 1/5 quad activation, RLE no tone or active movement except in toes.pt able to open mouth however unable to stick out tongue.     Lower Body Dressing Training  Lower Body Dressing Training Assistance: maximum assist (25% patient effort);  verbal cues;  nonverbal cues (demo/gestures);  1 person assist;  adjust socks in semi-supine;  decreased flexibility;  decreased ROM;  decreased strength;  impaired balance;  impaired coordination;  impaired motor control;  impaired postural control;  cognitive, decreased safety awareness    Grooming Training  Grooming Training Assistance: maximum assist (25% patient effort);  verbal cues;  nonverbal cues (demo/gestures);  1 person assist;  Pt required hand over hand assist to rub hands together, pt able to attend to hands when brought into visual field;  decreased flexibility;  decreased ROM;  decreased strength;  impaired balance;  impaired coordination;  impaired motor control;  impaired postural control;  cognitive, decreased safety awareness;  impaired vision    OT Cognitive Treatment  OT Cognitive Treatment Treatment Detail: Pt grossly A&Ox3, followed 100% brisk single step commands. Pt able to utilize vertical eye movement to respond to yes/no questions. Pt able to confirm name, year, month and birthmonth using vertical eye movement with 90% accuracy. Pt with 2 instances of L eye L visual gaze and able to track ~5 degrees to the right, R eye remained fixed, pt continued to present with vertical jerking movement at rest in B eyes.       Speech Therapy: 11/21    Overall Progress Summary    Progress Summary: Speech follow up this afternoon with PT/OT. Pt was seen fully awake and alert, sitting fully upright in chair, on O2 via trach collar. Improved head/neck control. Session completed with PT/OT. Horizontal placement of yes/no, months, years, and object of pictures (focused on 3). Pt with 70% accuracy. Pt intermittently able to visually scan upward     -----------------------------------------------------------------------  VITALS  T(C): 37 (11-22-24 @ 14:01), Max: 37.4 (11-21-24 @ 14:41)  HR: 105 (11-22-24 @ 12:16) (76 - 105)  BP: 141/105 (11-22-24 @ 10:00) (93/64 - 154/104)  RR: 16 (11-22-24 @ 10:00) (14 - 18)  SpO2: 91% (11-22-24 @ 12:16) (91% - 98%)  Wt(kg): --      PHYSICAL EXAM  Constitutional - NAD, Comfortable  HEENT - NCAT  Chest - Breathing comfortably, +trach collar  Cardiovascular - Regular pulse  Abdomen - No visible abdominal distension  Extremities - No deformities of upper or lower limbs. No calf tenderness   MSK - ROM within functional limits  Neurologic Exam -                    Cognitive - Awake, follows commands inconsistently, squeezes hand on command. +vertical eye movements for yes/no questions     Communication - non-verbal     Cranial Nerves -  difficulty tracking     Motor - trace movement of right hand. No other voluntary movement at this time, although limited by fatigue   Psychiatric - Mood stable, Affect WNL     -----------------------------------------------------------------------  RECENT LABS  CBC Full  -  ( 22 Nov 2024 05:30 )  WBC Count : 6.53 K/uL  RBC Count : 3.91 M/uL  Hemoglobin : 11.2 g/dL  Hematocrit : 35.6 %  Platelet Count - Automated : 226 K/uL  Mean Cell Volume : 91.0 fl  Mean Cell Hemoglobin : 28.6 pg  Mean Cell Hemoglobin Concentration : 31.5 g/dL  Auto Neutrophil # : x  Auto Lymphocyte # : x  Auto Monocyte # : x  Auto Eosinophil # : x  Auto Basophil # : x  Auto Neutrophil % : x  Auto Lymphocyte % : x  Auto Monocyte % : x  Auto Eosinophil % : x  Auto Basophil % : x    11-22    138  |  104  |  76[H]  ----------------------------<  132[H]  4.4   |  20[L]  |  2.33[H]    Ca    9.4      22 Nov 2024 05:30  Phos  4.9     11-22  Mg     2.4     11-22      Urinalysis Basic - ( 22 Nov 2024 05:30 )    Color: x / Appearance: x / SG: x / pH: x  Gluc: 132 mg/dL / Ketone: x  / Bili: x / Urobili: x   Blood: x / Protein: x / Nitrite: x   Leuk Esterase: x / RBC: x / WBC x   Sq Epi: x / Non Sq Epi: x / Bacteria: x        -----------------------------------------------------------------------  IMAGING      -----------------------------------------------------------------------  MEDICATIONS   acetaminophen   Oral Liquid .. 650 milliGRAM(s) every 6 hours PRN  acetylcysteine 10%  Inhalation 4 milliLiter(s) every 8 hours  albuterol/ipratropium for Nebulization 3 milliLiter(s) every 8 hours  amLODIPine   Tablet 10 milliGRAM(s) daily  artificial tears (preservative free) Ophthalmic Solution 1 Drop(s) every 4 hours  calcium acetate 667 milliGRAM(s) every 24 hours  chlorhexidine 0.12% Liquid 15 milliLiter(s) every 12 hours  chlorhexidine 2% Cloths 1 Application(s) <User Schedule>  doxazosin 8 milliGRAM(s) at bedtime  heparin   Injectable 5000 Unit(s) every 8 hours  hydrochlorothiazide 12.5 milliGRAM(s) daily  insulin lispro (ADMELOG) corrective regimen sliding scale   every 6 hours  petrolatum Ophthalmic Ointment 1 Application(s) two times a day  polyethylene glycol 3350 17 Gram(s) two times a day  sodium zirconium cyclosilicate 5 Gram(s) daily

## 2024-11-22 NOTE — PROGRESS NOTE ADULT - SUBJECTIVE AND OBJECTIVE BOX
HPI:  Unknown age male, unknown past medical history (reported stroke and MI by coworkers) presented to [facility] with AMS, Pt was working at [employer] when he was found down by coworkers. EMS called and pt brought to [facility] ED. Intubated, sedated, started on cardene for SBPs in 200s. CT head showed brain stem bleed. Transferred to NSICU for further management.  (30 Sep 2024 12:55)    INTERVAL EVENTS: BD 53, GEORGE ovn, HCTZ held for Cr bump, straight cath frequence increased to q4      Hospital course:   9/30: transferred from [facility]. A line placed. Versed dc'd. Cy Rader at bedside, states pt has family in [place], cannot confirm medications or PMH other than stroke and MI. 250cc bolus 3% given. LR switched to NS. hydralazine 25q8 started, 3% started, switched propofol to precedex   10/1: stability CTH done. Added labetalol, started TF. Palliative consulted. ethics consulted to determine surrogate. febrile 103, pan cx sent  10/2: BD 2, GEORGE overnight. TF resumed. Desatt'd to 80s, FiO2 inc. to 50. Fentanyl given, ABG, CXR ordered. Maxxed on precedex, started on propofol for DARIEN -4 - -5. Precedex dc'd. Duonebs, mucomyst, hypertonic added. 3% dc'd. Cardene dc'd. Start vanc/CTX. Increased labetalol 200q8. MRSA negative, dc'd vanc. ETT pulled back 2cm x 2, good positioning after confirmatory chest xray. Ethics attempting to establish HCP with family. Na 159, starting FW 250q6 for range 150-155.   10/3: BD3, GEORGE o/n, neuro stable. Na elevating, FW increased to 300q6. Dc'd bowel reg for diarrhea. vEEG started. SQH 5000q8 tonight.   10/4: BD 4, albumn bolus, incr. LR to 80 2/2 incr. in Cr, LR to 100cc/hr for uptrending Cr. Started 7% hypersal for 48hrs and SL atropine for inline/oral thick secretions. Dc'd CTX and started ancef for MSSA in the sputum. Nephrology consulted for CKD, f/u recs. SBP 170s, given hydralazine 10mg IVP.   10/5: BD5, o/n 10mg IVP hydralazine given for SBP 170s and started on hydralazine 25q8 via OGT. 10mg IV push labetalol for SBP > 160s. RT placed for diarrhea.   10/6: BD6, o/n FW increased to 350q4 per nephrology recs. IV tylenol for temp 100.6, SBp 160s presumed uncomfortable.   10/7: BD7, overnight pancultured for temp 101.8F.   10/8: BD8. GEORGE. Cr bumped. decreased LR to 75cc/hr. Adding simethicone ATC. incr hydralazine 50mgTID. Incr labetalol 300mgTID. Na 145, decreased FWF to 250q6. Start precedex. FENa consistent with intrinsic kidney injury. Pend repeat renal US. Retaining up to 1.3L, bladder scans q6, straight cath PRN  10/9: BD 9. GEORGE overnight. Neuro stable. abd xray for distention w non-specific gas pattern, OGT to LIWS for morning. duonebs/mucomyst to q8 for improving secretions. Changed tube feeds to Jevity 1.5 20cc/hr, low rate due to abdominal distention, nepro dense and more difficult to digest. Tolerating CPAP, confirmed by ABG.   10/10: BD 10. GEORGE overnight. Neuro stable. (+) gabriel for urinary retention on bladder scan. inc TF to goal rate of 40cc/hr. family leaning toward pursuing trach/PEG. 1/2 amp for FS 81.   10/11: BD 11. GEORGE overnight. Neuro stable. Trach/PEG consults placed.   10/12: BD 12. GEORGE overnight. Neuro stable. MRI brain complete.   10/13: BD 13. Increase flomax. Hold SQH after PM dose for trach tm. IVL.   10/14: BD 14. GEORGE overnight, remains on AC/VC. Gabriel placed for urinary retention. Dc'd free water.  S/p trach with pulm. NGT placed and CXR confirmed in good position.   10/15: BD 15, GEORGE ovn. resumed feeds. spiked 101, pan cx sent.   10/16: BD 16. GEORGE ovn. Lokelma 5mg for K+ 5.4. Started vanc q 24/zosyn for empiric PNA coverage, IVF to 100/hr. PEG held for fever.   10/17: BD 17,  ordered serum osm and urine osm for am. Started sinemet for neurostimulation. Increased cardura to 0.8. Started FW 100q4, dc'd IVF. MRSA negative, dc'd vanc. NGT replaced d/t coiling.   10/18: BD 18, GEORGE overnight, neuro stable. Amantadine added for neurostim. zosyn changed to unasyn for acinetobacter baumannii, failed TOV and required SC  10/19: BD 19, GEORGE ovn. cardura 2mg added for retention. labetalol decreased 200q8, hydralazine decreased 25q8. Gabriel replaced.   10/20: BD20, GEORGE overnight. NGT dislodged, replaced. PEG tomorrow w/ gen surg, FW increased to 150q4 and labetalol decreased to 100q8, lokelma given for hyperkalemia.   10/21: BD 21. POD0 PEG placement with Gen surg. decr labetolol to 50q8, incr. cardura to 0.4, started lokelma and phoslo, dc gabriel POD0 PEG placement with Gen surg.  10/22: BD 22. Plan to start TF today via PEG. dc labetalol, Following ophtho recs. Increased apnea settings - found to be in cheyne-moe respiration. CPAP 5/5.  10/23: BD 23. hydralazine d/c'd, trach collar trial today. Rectal tube placed at 6am.  10/24: BD24, o/n lokelma held due to diarrhea. Free water 100q6 resumed. dc'd tamsulosin, amantadine. Incr'd cardura to 8mg qhs. Dc'd FW. Switched jevity to nepro. gabriel placed for high urine output. Started SL atropine for oral secretions. Dc'd free water.  10/25: BD25, o/n decreased suctioning requirements to > q4hrs, GEORGE. Cr improving, cont phoslo, lokelma held at this time. Gabriel placed yest, cont. Tolerating trach collar. Given 500cc plasmalyte bolus for ANIKA. Dc'd sinemet.   10/26: BD26, o/n resumed lokelma 5mg daily and resumed 100cc free water q6hrs. Change in neuro status with new right pupillary dilation with anisocoria (right pupil 6mm fixed and left pupil 3mm briskly reactive). Given 23.4% NaCl bullet, taken for emergent CTH showing mostly resolved pontine hemorrhage, continued brainstem hypodensity likely edema d/t hemorrhage, no new hemorrhage or infarct, no herniation, mild increase in size of left lateral ventricle. Vitals remaine stable. Na goal > 140.   10/27: BD27, o/n GEORGE.Neuro stable. Pend stepdown with airway bed.   10/28: BD 28. GEORGE overnight. Neuro stable. Miralax ordered. Gabriel removed, pending TOV.  10/29: BD 29. GEORGE o/n. Given 2L NS over 8 hrs for increased BUN/Cr ratio. Gabriel placed for frequent straight cath.   10/30: BD 30.   10/31: BD 31. GEORGE overnight. Na 149, increased free water to 200q6. 1L NS for uptrending BUN.   11/1: BD 32. GEORGE overnight. Given 1L NS for dehydration. Na 146, increased FW to 250q6.   11/2: BD 33 GEORGE overnight, neuro stable, given 500cc bolus for net negative status and tachycardia   11/3: BD 34, GEORGE overnight, neuro stable. Patient remains tachycardic, EKG showing sinus tachycardia, given additional 500cc NS bolus. Febrile to 101.9F, pan cultured (without UA), CXR WNL, given tylenol.   11/4: BD 35, GEORGE overnight, neuro stable. Given 1L NS for tachycardia. sputum (+) for stenotropohomas maltophilia.   11/5: BD 36 GEORGE overnight, neuro stable. Vancomycin dc'd. Chest PT BID. ID consulted, cont zosyn.  11/6: BD 37. blood cx + klebsiella dc zosyn changed to cefepime, CTAP ordered, rpt blood cx sent.    11/7: BD 38. Pending CT A/P, given 250cc bolus and starting maintenance fluids overnight. Pending CT A/P after bolus   11/8: BD 39. CT CAP negative for infection.   11/9: BD 40. GEORGE overnight.  11/10: BD 41. GEORGE overnight. desat to 85 on trach collar, O2 inc to 10L and 100%, O2 sat inc to 95. pt tachy to 110s, euvolemic. given tylenol. ABG and CXR ordered. spiked fever, pancultured, RVP negative. AM ABG w pO2 79, rpt w pO2 79. pt appears comfortable, satting 94%.   11/11: BD 42. GEORGE overnight. pt became tachy to 130s, desat to 90 on 100% FiO2 and 10L. suctioned, (+) productive cough. temp 101.4, given 1g IV tylenol and 500cc NS bolus for euvolemia. fever and HR downtrending. LE dopplers negative for dvt  11/12: BD 43, GEORGE ovn, fever and HR downtrending, satting 97% 70% FIO2  11/13: BD 44, GEORGE ovn. started standing tylenol x24 hours for tachycardia. desat to 80s, o2 increased. CXR stable, pending CTA PE protocol.   11/14: BD 45, GEORGE overnight, neuro stable. resp therapy dec FiO2 to 70%.   11/15: BD 46, GEORGE overnight, neuro stable.  Rapid called for desaturation 30s, tachycardic 140s. Patient bagged, 100% fio2, heavily suctioned. CXR/POCUS unremarkable. ABG c/w desaturation. WBC 14.71. Afebrile. O2 improved to 90s and patient upgraded to ICU. ABG paO2 30s improved to 89 on vent. IV Tylenol x 1, sputum sent. Start protonix while o-n vent.   11/16: BD 47. POCUS showed collapsable IVCF, given 1L bolus. Vanco/Cefpime added empriic for PNA, NGT feeds restarted. MRSA swab neg, Vanc DC'd.   11/17: BD 48. GEORGE overnight. 1l bolus for tachycardia. Spiked to 101, cultured. 500cc bolus for tachycardia, tachy to 148 given 25mcg fentanyl, 250cc albumin, 1.5L bolus. 5 IV lopressor with response HR to 100s. +Stenotrophomonas on sputum cx.   11/18: BD 49. GEORGE overnight. Consulted ID, cefepime switched to bactrim x7days. Started hydrochlorothiazine 12.5mg daily.  11/19: BD 50. Tachy 120s, given tylenol and 500cc NS. Tolerating 5/5, switched to TCx. Holding phos binder. D/c Bactrim. D/c gabriel, f/u TOV. Dc'd PPI.   11/20: BD 51. GEORGE ovn. 1600 satting low 90s, mildly tachy to 110s, afebrile, RR wnl. O2 improved to mid 90s while inc O2 to 100% on TCx. CPAP 5/5 placed back on.  11/21: BD 52, GEORGE ovn, tolerating CPAP 5/5. Switch to trach collar during the day if tolerating well. HCTZ held for Cr bump, straight cath frequence increased to q4  11/22: BD 53, GEORGE ovn    Vital Signs Last 24 Hrs  T(C): 36.8 (21 Nov 2024 22:00), Max: 37.8 (21 Nov 2024 01:01)  T(F): 98.3 (21 Nov 2024 22:00), Max: 100 (21 Nov 2024 01:01)  HR: 98 (22 Nov 2024 00:15) (76 - 113)  BP: 117/77 (22 Nov 2024 00:00) (93/64 - 131/83)  BP(mean): 90 (22 Nov 2024 00:00) (74 - 101)  RR: 16 (22 Nov 2024 00:15) (15 - 18)  SpO2: 96% (22 Nov 2024 00:15) (92% - 98%)    Parameters below as of 22 Nov 2024 00:15  Patient On (Oxygen Delivery Method): ventilator, CPAP    O2 Concentration (%): 40    I&O's Summary    20 Nov 2024 07:01  -  21 Nov 2024 07:00  --------------------------------------------------------  IN: 2440 mL / OUT: 1785 mL / NET: 655 mL    21 Nov 2024 07:01  -  22 Nov 2024 00:57  --------------------------------------------------------  IN: 1160 mL / OUT: 1550 mL / NET: -390 mL        PHYSICAL EXAM:  Gen: No acute distress, resting comfortably in bed  Skin: Warm and dry. No rashes or lesions.  HEENT: Head without asymmetry or deformity. PERRL 3mm b/l, vertical EOM, tracts vertically, spontaneously opens eyes, no conjunctival redness, sclera is clear, hearing intact to whisper test bilaterally, nares patent bilaterally.  Lungs: +trach to vent, breathing is non-labored, symmetrical chest wall movement, lung sounds are clear bilaterally without rales, rhonchi, or wheezing.   Heart: Regular rate, S1, and S2 without murmur or gallop  Abd: +PEG. Abdomen is soft, non-distended. Normal bowel sounds on auscultation. No masses, hepatomegaly, or splenomegaly appreciated.  Peripheral vascular: Capillary refill <2 seconds; bilateral radial, posterior tibialis, and dorsalis pedis pulses 2/4.  Neuro: A&Ox0, intermittently follows commands, smiles on command, CN II-XII grossly intact; able to show 2 fingers intermittently on commands, squeezes fingers.  LUE withdraws to noxious stimuli; RLE and LLE withdraws from noxious stimuli.   Wounds/Drains: PEG and trach sites clean without surrounding erythema or purulent drainage.    LABS:                        11.2   5.88  )-----------( 229      ( 21 Nov 2024 05:30 )             34.6     11-21    x   |  x   |  61[H]  ----------------------------<  x   x    |  x   |  2.14[H]    Ca    9.2      21 Nov 2024 05:30  Phos  4.4     11-21  Mg     2.2     11-21        Urinalysis Basic - ( 21 Nov 2024 05:30 )    Color: x / Appearance: x / SG: x / pH: x  Gluc: 119 mg/dL / Ketone: x  / Bili: x / Urobili: x   Blood: x / Protein: x / Nitrite: x   Leuk Esterase: x / RBC: x / WBC x   Sq Epi: x / Non Sq Epi: x / Bacteria: x          CAPILLARY BLOOD GLUCOSE      POCT Blood Glucose.: 105 mg/dL (21 Nov 2024 16:34)  POCT Blood Glucose.: 103 mg/dL (21 Nov 2024 11:17)  POCT Blood Glucose.: 115 mg/dL (21 Nov 2024 06:21)      Drug Levels: [] N/A    CSF Analysis: [] N/A      Allergies    Allergy Status Unknown    Intolerances        Home Medications:      MEDICATIONS:  MEDICATIONS  (STANDING):  acetylcysteine 10%  Inhalation 4 milliLiter(s) Inhalation every 8 hours  albuterol/ipratropium for Nebulization 3 milliLiter(s) Nebulizer every 8 hours  amLODIPine   Tablet 10 milliGRAM(s) Oral daily  artificial tears (preservative free) Ophthalmic Solution 1 Drop(s) Right EYE every 4 hours  chlorhexidine 0.12% Liquid 15 milliLiter(s) Oral Mucosa every 12 hours  chlorhexidine 2% Cloths 1 Application(s) Topical <User Schedule>  doxazosin 8 milliGRAM(s) Oral at bedtime  heparin   Injectable 5000 Unit(s) SubCutaneous every 8 hours  insulin lispro (ADMELOG) corrective regimen sliding scale   SubCutaneous every 6 hours  petrolatum Ophthalmic Ointment 1 Application(s) Both EYES two times a day  polyethylene glycol 3350 17 Gram(s) Oral two times a day  sodium zirconium cyclosilicate 5 Gram(s) Oral daily    MEDICATIONS  (PRN):  acetaminophen   Oral Liquid .. 650 milliGRAM(s) Oral every 6 hours PRN Temp greater or equal to 38C (100.4F), Mild Pain (1 - 3)      CULTURES:  Culture Results:   No growth at 4 days (11-17 @ 05:59)  Culture Results:   Mixed gram negative rods including  Moderate Stenotrophomonas maltophilia  Commensal iram consistent with body site (11-16 @ 01:53)      RADIOLOGY & ADDITIONAL TESTS:      ASSESSMENT:  43 y/o PMH ?stroke/MI present to [facility] after collapsing at work. Decorticate posturing, vomiting, intubated for airway protection. 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 Transferred to [facility] for [facility] management. s/p trach 10/14. s/p peg 10/21. Re-upgrade to ICU 2/2 desaturation event and suctioning requirements 11/15.     PLAN:  Neuro:  - neuro q4/vitals q2  - pain control: tylenol prn  - vEEG (10/3-4)- negative, (10/17-10/19) - negative  - CTH 9/30: enlarged pontine hemorrhage, CTH 10/3: stable, CTH 10/25: mostly resolved pontine hemorrhage, no new hemorrhage or infarct, no herniation  - MRI brain 10/12: parenchymal hemorrhage, acute/subacute R cerebellar stroke    - stroke core measures, stroke neuro signed off    CV:  - -160  - HTN: amlodipine 10 mg qd, holding HCTZ 12.5 daily for Cr bump  - echo (9/30) EF 75%    Resp:  - trach collar w/ cpap 5/5 overnight  - Secretions: duonebs/mucomyst/chest PT q8h   - CTA chest 11/13 negative for PE, inc ground glass opacities    GI:  - TF via PEG (placed 10/21 by gen surg)  - bowel regimen, last BM 11/20  - CTAP 11/8 negative for infection      Renal:  - IVL  - FW flushes 250cc q6hrs   - hyperK 10/20: lokelma 5mg qd  - SC prn  - Urinary retenion: Cardura 8 mg   - CKD: trend BUN/Cr  - renal US 10/1: echogenicity c/w chronic med renal dz, repeat 10/8: inc renal echogeicity, c/f medical renal disease w increased hydro     Endo:  - ISS  -  A1c 5.4    Heme:  - DVT ppx: SCDs, SQH 5000u q8h   - LE dopplers negative 11/11    ID:  - febrile, last pancx 11/16  -  s/p Stenotrophomonas maltophilia PNA: s/p Bactrim (11/18-11/19) s/p Cefepime (11/16-11/18)  - MRSA swab neg 11/16   - pan cultured 11/3, (+) sputum for stenotrophomas maltophlia, blood cx (+) klebsiella, cefepime 2gq12 (11/6 - 11/12)   - empiric tx: s/p zosyn (11/3-11/6) , s/p vanc  (11/3- 11/5)  - S/p Ancef (10/4-10/14) for PNA, and s/p Unasyn (10/18-10/23) +actinobacter baumanii    MISC:  - ophtho consult for keratitis, s/p erythromycin ointment to rt eye q4hrs, ofloxacin ointment to rt eye QID, artificial tears to rt eye q2hrs, moisture chamber at bedtime    Dispo: icu, full code, pending placement and emergency medicaid     D/w Dr. D'Amico and Abdirizak     Assessment: present when checked     [] GCS   E   V   M     Heart Failure: [] Acute, [] acute on chronic, [] chronic   Heart Failure: [] Diastolic (HFpEF), [] Systolic (HRrEF), [] Combined (HFpEF and HFrEF), [] RHF, [] Pulm HTN, [] Other     [] ANIKA, [] ATN, [] AIN, [] other   [] CKD1, [] CKD2, [] CKD3, [] CKD4, [] CKD5, [] ESRD     Encephalopathy: [] Metabolic, [] Hepatic, [] Toxic, [] Neurological, [] Other     Abnormal Nutritional Status: [] malnutrition (see nutrition note), []underweight: BMI <19, [] morbid obesity: BMI >40, [] Cachexia     [] Sepsis   [] Hypovolemic shock, [] Cardiogenic shock, [] Hemorrhagic shock, [] Neurogenic shock   [] Acute respiratory failure   [] Cerebral edema, [] Brain compression / herniation   [] Functional quadriplegia   [] Acute blood loss anemia

## 2024-11-22 NOTE — PROGRESS NOTE ADULT - ASSESSMENT
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; respiratory failure s/p trach/peg readmit for hypoxia 2/2 presumed mucous plugging      NEURO:  NIHSS >25; poor functional recovery anticipated; family aware   -neuro check q6  -PT/OT following  -speech following   -PMNR following   -stroke to follow     PULMONARY:  tolerating trach collar w/ deflated cuff - CPAP qhs   Mucomyst & Duoneb to q8    CARDIOVASCULAR:  monitor on telemetry   vitals q4  sbp goal 100-160 ; diastolic pressure >80;c/w  HCTZ 12.5mg daily; c/w amlodipine 10mg     GASTROENTEROLOGY:  PEG in place; c/w tube feeds (nephro)  ensure BMs w/ Miralax & senna-lokelma w/ hold parameters for hyperkalemia 2/2 renal disease     RENAL/:  ANIKA on CKD   -c/w calcium acetate   -free water 250mg q6hrs   -check BMP qd  -strict i/o's ;  -Na goal 135-145  retaining requiring straight cath will c/w Cardura if continues to require straight cath will replace nery     ENDOCRINE:  Diabetes Mellitus  A1c- 5.3  Monitor FSG q6 hrs while w/ tube feeds   Hypothyroidism   TSH-1.23      HEME/ONC:  DVT ppx: heparin SQ  b/l SCDs    INFECTIOUS:  monitor off abx id sign off sputum culture Stenotrophomonas likely colonization       DISPO: recommended for IRON- (only Emergency Medicaid) pending RUCOL ; social work & CM following   stepdown tele unit    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; respiratory failure s/p trach/peg readmit for hypoxia 2/2 presumed mucous plugging      NEURO:  NIHSS >25; poor functional recovery anticipated; family aware   -neuro check q6  -PT/OT following  -speech following   -PMNR following   -stroke to follow     PULMONARY:  hypoxia insetting of secretions;   Mucomyst & Duoneb to q8, chest PT   place back on CPAP 10/7 40%     CARDIOVASCULAR:  monitor on telemetry   vitals q4  sbp goal 100-160 ; diastolic pressure >80;c/w  HCTZ 12.5mg daily; c/w amlodipine 10mg     GASTROENTEROLOGY:  PEG in place; c/w tube feeds (nephro)  ensure BMs w/ Miralax & senna-lokelma w/ hold parameters for hyperkalemia 2/2 renal disease     RENAL/:  CKD   -free water 250mg q6hrs   -check BMP qd  -strict i/o's ;  -Na goal 135-145  gabriel placement for urinary retention   c/w HCTZ 12.5mg qd  hyperphosphatemia in setting of CKD; restart calcium acetate for binding; at lower dose 667mg q24    ENDOCRINE:  Diabetes Mellitus  A1c- 5.3  Monitor FSG q6 hrs while w/ tube feeds   Hypothyroidism   TSH-1.23      HEME/ONC:  DVT ppx: heparin SQ  b/l SCDs    INFECTIOUS:  monitor off abx id sign off sputum culture Stenotrophomonas likely colonization       DISPO: recommended for IRON- (only Emergency Medicaid) pending RUCOL ; social work & CM following   stepdown tele unit    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; respiratory failure s/p trach/peg readmit for hypoxia 2/2 presumed mucous plugging      NEURO:  NIHSS >25; poor functional recovery anticipated; family aware   -neuro check q6  -PT/OT following  -speech following   -PMNR following   -stroke to follow     PULMONARY:  hypoxia insetting of secretions;   Mucomyst & Duoneb to q8, chest PT   place back on CPAP 10/7 40%     CARDIOVASCULAR:  monitor on telemetry   vitals q4  sbp goal 100-160 ; diastolic pressure >80;c/w  HCTZ 12.5mg daily; c/w amlodipine 10mg     GASTROENTEROLOGY:  PEG in place; c/w tube feeds (nephro)  ensure BMs w/ Miralax & senna-lokelma w/ hold parameters for hyperkalemia 2/2 renal disease     RENAL/:  CKD   -free water 250mg q6hrs   -check BMP qd  -strict i/o's ;  -Na goal 135-145  gabriel placement for urinary retention   c/w HCTZ 12.5mg qd  hyperphosphatemia in setting of CKD; restart calcium acetate for binding; at lower dose 667mg q24    ENDOCRINE:  Diabetes Mellitus  A1c- 5.3  Monitor FSG q6 hrs while w/ tube feeds   Hypothyroidism   TSH-1.23      HEME/ONC:  DVT ppx: heparin SQ  b/l SCDs    INFECTIOUS:  monitor off abx id sign off sputum culture Stenotrophomonas likely colonization       DISPO: recommended for IRON- (only Emergency Medicaid) pending RUCOL ; social work & CM following

## 2024-11-22 NOTE — CHART NOTE - NSCHARTNOTEFT_GEN_A_CORE
Admitting Diagnosis:   Patient is a 44y old  Male who presents with a chief complaint of brain stem bleed (22 Nov 2024 09:34)  PAST MEDICAL & SURGICAL HISTORY:  Acute myocardial infarction  CVA (cerebral vascular accident)      Current Nutrition Order: Maribeth [employer] Renal Support 1.8: at goal of 60mL/hour x 18 hours, providing 1080mL total volume, 1944 calories, 86g protein, ~713mL free water; this meets ~24 calories/kg ideal body weight, ~1.1g protein/kg ideal body weight; with Banatrol Tube Feeding 2x/day    PO Intake: NPO with tube feeds meeting 100% estimated nutrient needs     GI Issues: flat, nondistended abdomen, all abdominal quadrants audible and normoactive, moderate BM, BM on 11/22/24;     Pain: Absence of non-verbal indicators    Skin Integrity: intact, no pressure ulcers, generalized trace edema; PEG present      11-21-24 @ 07:01  -  11-22-24 @ 07:00  --------------------------------------------------------  IN: 1900 mL / OUT: 2350 mL / NET: -450 mL    11-22-24 @ 07:01  -  11-22-24 @ 14:20  --------------------------------------------------------  IN: 430 mL / OUT: 0 mL / NET: 430 mL        Labs:   11-22    138  |  104  |  76[H]  ----------------------------<  132[H]  4.4   |  20[L]  |  2.33[H]    Ca    9.4      22 Nov 2024 05:30  Phos  4.9     11-22  Mg     2.4     11-22      CAPILLARY BLOOD GLUCOSE      POCT Blood Glucose.: 113 mg/dL (22 Nov 2024 11:20)  POCT Blood Glucose.: 123 mg/dL (22 Nov 2024 05:43)  POCT Blood Glucose.: 105 mg/dL (21 Nov 2024 16:34)      Medications:  MEDICATIONS  (STANDING):  acetylcysteine 10%  Inhalation 4 milliLiter(s) Inhalation every 8 hours  albuterol/ipratropium for Nebulization 3 milliLiter(s) Nebulizer every 8 hours  amLODIPine   Tablet 10 milliGRAM(s) Oral daily  artificial tears (preservative free) Ophthalmic Solution 1 Drop(s) Right EYE every 4 hours  calcium acetate 667 milliGRAM(s) Oral every 24 hours  chlorhexidine 0.12% Liquid 15 milliLiter(s) Oral Mucosa every 12 hours  chlorhexidine 2% Cloths 1 Application(s) Topical <User Schedule>  doxazosin 8 milliGRAM(s) Oral at bedtime  heparin   Injectable 5000 Unit(s) SubCutaneous every 8 hours  hydrochlorothiazide 12.5 milliGRAM(s) Oral daily  insulin lispro (ADMELOG) corrective regimen sliding scale   SubCutaneous every 6 hours  petrolatum Ophthalmic Ointment 1 Application(s) Both EYES two times a day  polyethylene glycol 3350 17 Gram(s) Oral two times a day  sodium zirconium cyclosilicate 5 Gram(s) Oral daily    MEDICATIONS  (PRN):  acetaminophen   Oral Liquid .. 650 milliGRAM(s) Oral every 6 hours PRN Temp greater or equal to 38C (100.4F), Mild Pain (1 - 3)    Dosing Anthropometrics:   Height for BMI (FEET)	5 Feet  Height for BMI (INCHES)	8 Inch(s)  Height for BMI (CM)	172.72 Centimeter(s)  Weight for BMI (lbs)	176.4 lb  Weight for BMI (kg)	80 kg  Body Mass Index	              26.8  ideal body weight:               154 pounds / 70 kg   %ideal body weight             114%     Weight Change: No new wt obtained since admit, strongly recommend nursing to obtain new wt to track/ trend changes     Estimated energy needs:  Estimated Energy Needs Weight (lbs)	176 lb  Estimated Energy Needs Weight (kg)	80 kg  Estimated Energy Needs From (christiana/kg) 22  Estimated Energy Needs To (christiana/kg)	27  Estimated Energy Needs Calculated From (christiana/kg) 1760  Estimated Energy Needs Calculated To (christiana/kg)	2160    Estimated Protein Needs Weight (lbs)	176 lb  Estimated Protein Needs Weight (kg)	80 kg  Estimated Protein Needs From (g/kg)	1.0  Estimated Protein Needs To (g/kg)	1.4  Estimated Protein Needs Calculated From (g/kg) 80   Estimated Protein Needs Calculated To (g/kg)	112    Estimated Fluid Needs Weight (lbs)	176 lb  Estimated Fluid Needs Weight (kg)	80 kg  Estimated Fluid Needs From (ml/kg)	20  Estimated Fluid Needs To (ml/kg)	25  Estimated Fluid Needs Calculated From (ml/kg)	1600  Estimated Fluid Needs Calculated To (ml/kg)	2000    Other Calculations: Based on actual body weight as ideal body weigh between %, no longer in ICU setting. Needs adjusted for age, clinical course, vented.     Subjective: This is a 44 year old male, unknown past medical history (reported stroke and MI by coworkers) presented to [facility] with AMS, Pt was working at [employer] when he was found down by coworkers. EMS called and pt brought to [facility] ED. Intubated, sedated, started on cardene for SBPs in 200s. CT head showed brain stem bleed. Transferred to NSICU for further management.    Patient seen at bedside on 9UR for nutrition follow up. Current diet order: remains NPO with tube feeds meeting 100% estimated nutrient needs. Continues to receive Nepro 1.8 tube feed formula at goal rate of 60 cc/hr x18 hrs via PEG overnight as per flowsheet, however, as per flowsheet this afternoon, pt being titrated up to goal - **not necessary as pt has already been receiving at goal rate. Spoke with RN this morning who denied pt with any nausea/ vomiting/ constipation/ diarrhea to her knowledge at the time, however, no bowel movements documented as per flowsheet, ? if diarrhea persists, will keep order of Banatrol BID for now but once completely resolved will discontinue. Labs including potassium and phosphorus WNL, BUN/ Cr remain elevated; rx reviewed as above. RDN will continue to follow, see nutrition recommendations below.     Previous Nutrition Diagnosis: Inadequate Oral Intake related to inability to meet nutritional demands via PO as evidenced by NPO with tube feedings    Active [  x ]  Resolved [   ]    Goal: Pt will consume >/= 75% of all meals and supplements via tolerated route     Nutrition Recommendations:  1. NPO with tube feedings, continue with current tube feed regimen to continue to meet 100% estimated needs:  **[employer] Renal Support 1.8: at goal of 60mL/hour x 18 hours, providing 1080mL total volume, 1944 calories, 86g protein, ~713mL free water; this meets ~24 calories/kg ideal body weight, ~1.1g protein/kg ideal body weight**  **Provide Banatrol Tube Feeding prn**  **Maintain aspiration precautions at all times; monitor for signs/symptoms of intolerance**  2. Monitor weights (weekly), GI function, skin integrity; electrolytes, BMP, glucose, CBC per MD discretion **renal indices**  3. Pain and bowel regimen per medical team  4. Align nutrition with goals of care at all times  5. Recommend nursing to obtain new wts to track/ trend changes    Risk Level: High [ x ] Moderate [   ] Low [   ]. Admitting Diagnosis:   Patient is a 44y old  Male who presents with a chief complaint of brain stem bleed (22 Nov 2024 09:34)  PAST MEDICAL & SURGICAL HISTORY:  Acute myocardial infarction  CVA (cerebral vascular accident)      Current Nutrition Order: Maribeth Sauceda Renal Support 1.8: at goal of 60mL/hour x 18 hours, providing 1080mL total volume, 1944 calories, 86g protein, ~713mL free water; this meets ~24 calories/kg ideal body weight, ~1.1g protein/kg ideal body weight; with Banatrol Tube Feeding 2x/day    PO Intake: NPO with tube feeds meeting 100% estimated nutrient needs     GI Issues: flat, nondistended abdomen, all abdominal quadrants audible and normoactive, moderate BM, BM on 11/22/24;     Pain: Absence of non-verbal indicators    Skin Integrity: intact, no pressure ulcers, generalized trace edema; PEG present      11-21-24 @ 07:01  -  11-22-24 @ 07:00  --------------------------------------------------------  IN: 1900 mL / OUT: 2350 mL / NET: -450 mL    11-22-24 @ 07:01  -  11-22-24 @ 14:20  --------------------------------------------------------  IN: 430 mL / OUT: 0 mL / NET: 430 mL        Labs:   11-22    138  |  104  |  76[H]  ----------------------------<  132[H]  4.4   |  20[L]  |  2.33[H]    Ca    9.4      22 Nov 2024 05:30  Phos  4.9     11-22  Mg     2.4     11-22      CAPILLARY BLOOD GLUCOSE      POCT Blood Glucose.: 113 mg/dL (22 Nov 2024 11:20)  POCT Blood Glucose.: 123 mg/dL (22 Nov 2024 05:43)  POCT Blood Glucose.: 105 mg/dL (21 Nov 2024 16:34)      Medications:  MEDICATIONS  (STANDING):  acetylcysteine 10%  Inhalation 4 milliLiter(s) Inhalation every 8 hours  albuterol/ipratropium for Nebulization 3 milliLiter(s) Nebulizer every 8 hours  amLODIPine   Tablet 10 milliGRAM(s) Oral daily  artificial tears (preservative free) Ophthalmic Solution 1 Drop(s) Right EYE every 4 hours  calcium acetate 667 milliGRAM(s) Oral every 24 hours  chlorhexidine 0.12% Liquid 15 milliLiter(s) Oral Mucosa every 12 hours  chlorhexidine 2% Cloths 1 Application(s) Topical <User Schedule>  doxazosin 8 milliGRAM(s) Oral at bedtime  heparin   Injectable 5000 Unit(s) SubCutaneous every 8 hours  hydrochlorothiazide 12.5 milliGRAM(s) Oral daily  insulin lispro (ADMELOG) corrective regimen sliding scale   SubCutaneous every 6 hours  petrolatum Ophthalmic Ointment 1 Application(s) Both EYES two times a day  polyethylene glycol 3350 17 Gram(s) Oral two times a day  sodium zirconium cyclosilicate 5 Gram(s) Oral daily    MEDICATIONS  (PRN):  acetaminophen   Oral Liquid .. 650 milliGRAM(s) Oral every 6 hours PRN Temp greater or equal to 38C (100.4F), Mild Pain (1 - 3)    Dosing Anthropometrics:   Height for BMI (FEET)	5 Feet  Height for BMI (INCHES)	8 Inch(s)  Height for BMI (CM)	172.72 Centimeter(s)  Weight for BMI (lbs)	176.4 lb  Weight for BMI (kg)	80 kg  Body Mass Index	              26.8  ideal body weight:               154 pounds / 70 kg   %ideal body weight             114%     Weight Change: No new wt obtained since admit, strongly recommend nursing to obtain new wt to track/ trend changes     Estimated energy needs:  Estimated Energy Needs Weight (lbs)	176 lb  Estimated Energy Needs Weight (kg)	80 kg  Estimated Energy Needs From (christiana/kg) 22  Estimated Energy Needs To (christiana/kg)	27  Estimated Energy Needs Calculated From (christiana/kg) 1760  Estimated Energy Needs Calculated To (christiana/kg)	2160    Estimated Protein Needs Weight (lbs)	176 lb  Estimated Protein Needs Weight (kg)	80 kg  Estimated Protein Needs From (g/kg)	1.0  Estimated Protein Needs To (g/kg)	1.4  Estimated Protein Needs Calculated From (g/kg) 80   Estimated Protein Needs Calculated To (g/kg)	112    Estimated Fluid Needs Weight (lbs)	176 lb  Estimated Fluid Needs Weight (kg)	80 kg  Estimated Fluid Needs From (ml/kg)	20  Estimated Fluid Needs To (ml/kg)	25  Estimated Fluid Needs Calculated From (ml/kg)	1600  Estimated Fluid Needs Calculated To (ml/kg)	2000    Other Calculations: Based on actual body weight as ideal body weigh between %, no longer in ICU setting. Needs adjusted for age, clinical course, vented.     Subjective: This is a 44 year old male, unknown past medical history (reported stroke and MI by coworkers) presented to [facility] with AMS, Pt was working at [employer] when he was found down by coworkers. EMS called and pt brought to [facility] ED. Intubated, sedated, started on cardene for SBPs in 200s. CT head showed brain stem bleed. Transferred to NSICU for further management.    Patient seen at bedside on 8 East for nutrition follow up. Current diet order: remains NPO with tube feeds meeting 100% estimated nutrient needs. Pt is receiving [employer] Renal 1.8 instead of Nepro formula related to Nepro formula out of stock. Flat, nondistended abdomen, all abdominal quadrants audible and normoactive, moderate BM, BM on 11/22/24. Absence of non-verbal indicators of pain. Skin intact, no pressure ulcers, generalized trace edema. Labs reviewed: elevated BUN (76), Creatinine (2.33), Phosphorus (4.9), low eGFR (34); medical team is aware. RDN will continue to follow, please see nutrition recommendations below.     Previous Nutrition Diagnosis: Inadequate Oral Intake related to inability to meet nutritional demands via PO as evidenced by NPO with tube feedings    Active [  x ]  Resolved [   ]    Goal: Pt will consume >/= 75% of all meals and supplements via tolerated route     Nutrition Recommendations:  1. NPO with tube feedings, continue with current tube feed regimen to continue to meet 100% estimated needs:  **[employer] Renal Support 1.8: at goal of 60mL/hour x 18 hours, providing 1080mL total volume, 1944 calories, 86g protein, ~713mL free water; this meets ~24 calories/kg ideal body weight, ~1.1g protein/kg ideal body weight**  **Provide Banatrol Tube Feeding prn**  **Maintain aspiration precautions at all times; monitor for signs/symptoms of intolerance**  2. Monitor weights (weekly), GI function, skin integrity; electrolytes, BMP, glucose, CBC per MD discretion **renal indices**  3. Pain and bowel regimen per medical team  4. Align nutrition with goals of care at all times  5. Recommend nursing to obtain new wts to track/trend changes    Risk Level: High [ x ] Moderate [   ] Low [   ].

## 2024-11-22 NOTE — PROGRESS NOTE ADULT - SUBJECTIVE AND OBJECTIVE BOX
INTERVAL HISTORY: HPI:  Unknown age male, unknown past medical history (reported stroke and MI by coworkers) presented to [facility] with AMS, Pt was working at [employer] when he was found down by coworkers. EMS called and pt brought to [facility] ED. Intubated, sedated, started on Cardene for SBPs in 200s. CT head showed brain stem bleed. Transferred to NSICU for further management.  (30 Sep 2024 12:55)    Drug Dosing Weight  Height (cm): 172.7 (30 Sep 2024 08:39)  Weight (kg): 80 (30 Sep 2024 09:04)  BMI (kg/m2): 26.8 (30 Sep 2024 09:04)  BSA (m2): 1.94 (30 Sep 2024 09:04)    PAST MEDICAL & SURGICAL HISTORY:  Acute myocardial infarction  CVA (cerebral vascular accident)    REVIEW OF SYSTEMS: [ ] Unable to Assess due to neurologic exam   [ ] All ROS addressed below are non-contributory, except:  Neuro: [ ] Headache [ ] Back pain [ ] Numbness [ ] Weakness [ ] Ataxia [ ] Dizziness [ ] Aphasia [ ] Dysarthria [ ] Visual disturbance  Resp: [ ] Shortness of breath/dyspnea, [ ] Orthopnea [ ] Cough  CV: [ ] Chest pain [ ] Palpitation [ ] Lightheadedness [ ] Syncope  Renal: [ ] Thirst [ ] Edema  GI: [ ] Nausea [ ] Emesis [ ] Abdominal pain [ ] Constipation [ ] Diarrhea  Hem: [ ] Hematemesis [ ] bright red blood per rectum  ID: [ ] Fever [ ] Chills [ ] Dysuria  ENT: [ ] Rhinorrhea    PHYSICAL EXAM:  General: No Acute Distress, trach collar   Neurological: eyes open spontaneously, tracks vertically, RUE wiggles fingers to commands,, b/l LE WD, LUE extension, RUE WD, pupils b/l 3mm reactive, cough gag (+)  Pulmonary: Clear to Auscultation, No Rales, No Rhonchi, No Wheezes   Cardiovascular: S1, S2, Regular Rate and Rhythm   Gastrointestinal: Soft, Nontender, Nondistended Extremities: No calf tenderness             ICU Vital Signs Last 24 Hrs  T(C): 37.4 (22 Nov 2024 09:03), Max: 37.4 (21 Nov 2024 14:41)  T(F): 99.3 (22 Nov 2024 09:03), Max: 99.3 (21 Nov 2024 14:41)  HR: 95 (22 Nov 2024 09:00) (76 - 106)  BP: 151/104 (22 Nov 2024 09:00) (93/64 - 154/104)  BP(mean): 121 (22 Nov 2024 09:00) (74 - 124)  ABP: --  ABP(mean): --  RR: 15 (22 Nov 2024 09:00) (14 - 18)  SpO2: 96% (22 Nov 2024 09:00) (92% - 98%)      11-21-24 @ 07:01  -  11-22-24 @ 07:00  --------------------------------------------------------  IN: 1900 mL / OUT: 2350 mL / NET: -450 mL        Mode: CPAP with PS, FiO2: 40, PEEP: 5, PS: 5, MAP: 6, PIP: 11    acetaminophen   Oral Liquid .. 650 milliGRAM(s) Oral every 6 hours PRN  acetylcysteine 10%  Inhalation 4 milliLiter(s) Inhalation every 8 hours  albuterol/ipratropium for Nebulization 3 milliLiter(s) Nebulizer every 8 hours  amLODIPine   Tablet 10 milliGRAM(s) Oral daily  artificial tears (preservative free) Ophthalmic Solution 1 Drop(s) Right EYE every 4 hours  chlorhexidine 0.12% Liquid 15 milliLiter(s) Oral Mucosa every 12 hours  chlorhexidine 2% Cloths 1 Application(s) Topical <User Schedule>  doxazosin 8 milliGRAM(s) Oral at bedtime  heparin   Injectable 5000 Unit(s) SubCutaneous every 8 hours  insulin lispro (ADMELOG) corrective regimen sliding scale   SubCutaneous every 6 hours  petrolatum Ophthalmic Ointment 1 Application(s) Both EYES two times a day  polyethylene glycol 3350 17 Gram(s) Oral two times a day  sodium zirconium cyclosilicate 5 Gram(s) Oral daily      LABS:  Na: 138 (11-22 @ 05:30), 140 (11-21 @ 05:30), 137 (11-20 @ 04:35), 138 (11-19 @ 16:10)  K: 4.4 (11-22 @ 05:30), 4.4 (11-21 @ 05:30), 3.9 (11-20 @ 04:35), 4.4 (11-19 @ 16:10)  Cl: 104 (11-22 @ 05:30), 105 (11-21 @ 05:30), 105 (11-20 @ 04:35), 109 (11-19 @ 16:10)  CO2: 20 (11-22 @ 05:30), 22 (11-21 @ 05:30), 21 (11-20 @ 04:35), 18 (11-19 @ 16:10)  BUN: 76 (11-22 @ 05:30), 61 (11-21 @ 09:18), 58 (11-21 @ 05:30), 51 (11-20 @ 04:35), 46 (11-19 @ 16:10)  Cr: 2.33 (11-22 @ 05:30), 2.14 (11-21 @ 09:18), 2.26 (11-21 @ 05:30), 2.00 (11-20 @ 04:35), 1.78 (11-19 @ 16:10)  Glu: 132(11-22 @ 05:30), 119(11-21 @ 05:30), 105(11-20 @ 04:35), 118(11-19 @ 16:10)    Hgb: 11.2 (11-22 @ 05:30), 11.2 (11-21 @ 05:30), 10.6 (11-20 @ 04:35)  Hct: 35.6 (11-22 @ 05:30), 34.6 (11-21 @ 05:30), 33.7 (11-20 @ 04:35)  WBC: 6.53 (11-22 @ 05:30), 5.88 (11-21 @ 05:30), 6.78 (11-20 @ 04:35)  Plt: 226 (11-22 @ 05:30), 229 (11-21 @ 05:30), 218 (11-20 @ 04:35)             INTERVAL HISTORY: HPI:  Unknown age male, unknown past medical history (reported stroke and MI by coworkers) presented to [facility] with AMS, Pt was working at [employer] when he was found down by coworkers. EMS called and pt brought to [facility] ED. Intubated, sedated, started on Cardene for SBPs in 200s. CT head showed brain stem bleed. Transferred to NSICU for further management.  (30 Sep 2024 12:55)    Drug Dosing Weight  Height (cm): 172.7 (30 Sep 2024 08:39)  Weight (kg): 80 (30 Sep 2024 09:04)  BMI (kg/m2): 26.8 (30 Sep 2024 09:04)  BSA (m2): 1.94 (30 Sep 2024 09:04)    PAST MEDICAL & SURGICAL HISTORY:  Acute myocardial infarction  CVA (cerebral vascular accident)    REVIEW OF SYSTEMS: [ ] Unable to Assess due to neurologic exam   [ ] All ROS addressed below are non-contributory, except:  Neuro: [ ] Headache [ ] Back pain [ ] Numbness [ ] Weakness [ ] Ataxia [ ] Dizziness [ ] Aphasia [ ] Dysarthria [ ] Visual disturbance  Resp: [ ] Shortness of breath/dyspnea, [ ] Orthopnea [ ] Cough  CV: [ ] Chest pain [ ] Palpitation [ ] Lightheadedness [ ] Syncope  Renal: [ ] Thirst [ ] Edema  GI: [ ] Nausea [ ] Emesis [ ] Abdominal pain [ ] Constipation [ ] Diarrhea  Hem: [ ] Hematemesis [ ] bright red blood per rectum  ID: [ ] Fever [ ] Chills [ ] Dysuria  ENT: [ ] Rhinorrhea    PHYSICAL EXAM:  General: No Acute Distress, trach collar   Neurological: eyes open spontaneously, tracks vertically, RUE  wiggles fingers to commands, RLE wiggles to command  b/l LE WD, LUE extension, RUE WD, pupils b/l 3mm reactive, cough gag (+)  Pulmonary: Clear to Auscultation, No Rales, No Rhonchi, No Wheezes   Cardiovascular: S1, S2, Regular Rate and Rhythm   Gastrointestinal: Soft, Nontender, Nondistended   Extremities: No calf tenderness     ICU Vital Signs Last 24 Hrs  T(C): 37.4 (22 Nov 2024 09:03), Max: 37.4 (21 Nov 2024 14:41)  T(F): 99.3 (22 Nov 2024 09:03), Max: 99.3 (21 Nov 2024 14:41)  HR: 95 (22 Nov 2024 09:00) (76 - 106)  BP: 151/104 (22 Nov 2024 09:00) (93/64 - 154/104)  BP(mean): 121 (22 Nov 2024 09:00) (74 - 124)  RR: 15 (22 Nov 2024 09:00) (14 - 18)  SpO2: 96% (22 Nov 2024 09:00) (92% - 98%)      11-21-24 @ 07:01  -  11-22-24 @ 07:00  --------------------------------------------------------  IN: 1900 mL / OUT: 2350 mL / NET: -450 mL        Mode: CPAP with PS, FiO2: 40, PEEP: 5, PS: 5, MAP: 6, PIP: 11    acetaminophen   Oral Liquid .. 650 milliGRAM(s) Oral every 6 hours PRN  acetylcysteine 10%  Inhalation 4 milliLiter(s) Inhalation every 8 hours  albuterol/ipratropium for Nebulization 3 milliLiter(s) Nebulizer every 8 hours  amLODIPine   Tablet 10 milliGRAM(s) Oral daily  artificial tears (preservative free) Ophthalmic Solution 1 Drop(s) Right EYE every 4 hours  chlorhexidine 0.12% Liquid 15 milliLiter(s) Oral Mucosa every 12 hours  chlorhexidine 2% Cloths 1 Application(s) Topical <User Schedule>  doxazosin 8 milliGRAM(s) Oral at bedtime  heparin   Injectable 5000 Unit(s) SubCutaneous every 8 hours  insulin lispro (ADMELOG) corrective regimen sliding scale   SubCutaneous every 6 hours  petrolatum Ophthalmic Ointment 1 Application(s) Both EYES two times a day  polyethylene glycol 3350 17 Gram(s) Oral two times a day  sodium zirconium cyclosilicate 5 Gram(s) Oral daily      LABS:  Na: 138 (11-22 @ 05:30), 140 (11-21 @ 05:30), 137 (11-20 @ 04:35), 138 (11-19 @ 16:10)  K: 4.4 (11-22 @ 05:30), 4.4 (11-21 @ 05:30), 3.9 (11-20 @ 04:35), 4.4 (11-19 @ 16:10)  Cl: 104 (11-22 @ 05:30), 105 (11-21 @ 05:30), 105 (11-20 @ 04:35), 109 (11-19 @ 16:10)  CO2: 20 (11-22 @ 05:30), 22 (11-21 @ 05:30), 21 (11-20 @ 04:35), 18 (11-19 @ 16:10)  BUN: 76 (11-22 @ 05:30), 61 (11-21 @ 09:18), 58 (11-21 @ 05:30), 51 (11-20 @ 04:35), 46 (11-19 @ 16:10)  Cr: 2.33 (11-22 @ 05:30), 2.14 (11-21 @ 09:18), 2.26 (11-21 @ 05:30), 2.00 (11-20 @ 04:35), 1.78 (11-19 @ 16:10)  Glu: 132(11-22 @ 05:30), 119(11-21 @ 05:30), 105(11-20 @ 04:35), 118(11-19 @ 16:10)    Hgb: 11.2 (11-22 @ 05:30), 11.2 (11-21 @ 05:30), 10.6 (11-20 @ 04:35)  Hct: 35.6 (11-22 @ 05:30), 34.6 (11-21 @ 05:30), 33.7 (11-20 @ 04:35)  WBC: 6.53 (11-22 @ 05:30), 5.88 (11-21 @ 05:30), 6.78 (11-20 @ 04:35)  Plt: 226 (11-22 @ 05:30), 229 (11-21 @ 05:30), 218 (11-20 @ 04:35)

## 2024-11-23 LAB
ANION GAP SERPL CALC-SCNC: 17 MMOL/L — SIGNIFICANT CHANGE UP (ref 5–17)
BUN SERPL-MCNC: 79 MG/DL — HIGH (ref 7–23)
CALCIUM SERPL-MCNC: 9.6 MG/DL — SIGNIFICANT CHANGE UP (ref 8.4–10.5)
CHLORIDE SERPL-SCNC: 103 MMOL/L — SIGNIFICANT CHANGE UP (ref 96–108)
CO2 SERPL-SCNC: 19 MMOL/L — LOW (ref 22–31)
CREAT SERPL-MCNC: 2.15 MG/DL — HIGH (ref 0.5–1.3)
EGFR: 38 ML/MIN/1.73M2 — LOW
EGFR: 38 ML/MIN/1.73M2 — LOW
GLUCOSE SERPL-MCNC: 124 MG/DL — HIGH (ref 70–99)
HCT VFR BLD CALC: 37.4 % — LOW (ref 39–50)
HGB BLD-MCNC: 12.1 G/DL — LOW (ref 13–17)
MAGNESIUM SERPL-MCNC: 2.4 MG/DL — SIGNIFICANT CHANGE UP (ref 1.6–2.6)
MCHC RBC-ENTMCNC: 29.9 PG — SIGNIFICANT CHANGE UP (ref 27–34)
MCHC RBC-ENTMCNC: 32.4 G/DL — SIGNIFICANT CHANGE UP (ref 32–36)
MCV RBC AUTO: 92.3 FL — SIGNIFICANT CHANGE UP (ref 80–100)
NRBC # BLD: 0 /100 WBCS — SIGNIFICANT CHANGE UP (ref 0–0)
NRBC BLD-RTO: 0 /100 WBCS — SIGNIFICANT CHANGE UP (ref 0–0)
OB PNL STL: NEGATIVE — SIGNIFICANT CHANGE UP
PHOSPHATE SERPL-MCNC: 5.9 MG/DL — HIGH (ref 2.5–4.5)
PLATELET # BLD AUTO: 268 K/UL — SIGNIFICANT CHANGE UP (ref 150–400)
POTASSIUM SERPL-MCNC: 4.6 MMOL/L — SIGNIFICANT CHANGE UP (ref 3.5–5.3)
POTASSIUM SERPL-SCNC: 4.6 MMOL/L — SIGNIFICANT CHANGE UP (ref 3.5–5.3)
RBC # BLD: 4.05 M/UL — LOW (ref 4.2–5.8)
RBC # FLD: 14.8 % — HIGH (ref 10.3–14.5)
SODIUM SERPL-SCNC: 139 MMOL/L — SIGNIFICANT CHANGE UP (ref 135–145)
WBC # BLD: 8.38 K/UL — SIGNIFICANT CHANGE UP (ref 3.8–10.5)
WBC # FLD AUTO: 8.38 K/UL — SIGNIFICANT CHANGE UP (ref 3.8–10.5)

## 2024-11-23 PROCEDURE — 99231 SBSQ HOSP IP/OBS SF/LOW 25: CPT

## 2024-11-23 PROCEDURE — 99232 SBSQ HOSP IP/OBS MODERATE 35: CPT

## 2024-11-23 RX ORDER — SEVELAMER HYDROCHLORIDE 800 MG/1
800 TABLET ORAL EVERY 12 HOURS
Refills: 0 | Status: DISCONTINUED | OUTPATIENT
Start: 2024-11-23 | End: 2024-11-24

## 2024-11-23 RX ADMIN — Medication 1 DROP(S): at 23:25

## 2024-11-23 RX ADMIN — Medication 15 MILLILITER(S): at 23:06

## 2024-11-23 RX ADMIN — Medication 1 DROP(S): at 06:46

## 2024-11-23 RX ADMIN — DOXAZOSIN MESYLATE 8 MILLIGRAM(S): 8 TABLET ORAL at 23:06

## 2024-11-23 RX ADMIN — ACETYLCYSTEINE 4 MILLILITER(S): 200 INHALANT RESPIRATORY (INHALATION) at 17:31

## 2024-11-23 RX ADMIN — ACETYLCYSTEINE 4 MILLILITER(S): 200 INHALANT RESPIRATORY (INHALATION) at 21:01

## 2024-11-23 RX ADMIN — Medication 15 MILLILITER(S): at 06:43

## 2024-11-23 RX ADMIN — AMLODIPINE BESYLATE 10 MILLIGRAM(S): 10 TABLET ORAL at 06:43

## 2024-11-23 RX ADMIN — ACETYLCYSTEINE 4 MILLILITER(S): 200 INHALANT RESPIRATORY (INHALATION) at 06:12

## 2024-11-23 RX ADMIN — Medication 1 APPLICATION(S): at 06:47

## 2024-11-23 RX ADMIN — Medication 1 DROP(S): at 10:17

## 2024-11-23 RX ADMIN — IPRATROPIUM BROMIDE AND ALBUTEROL SULFATE 3 MILLILITER(S): .5; 2.5 SOLUTION RESPIRATORY (INHALATION) at 06:12

## 2024-11-23 RX ADMIN — SODIUM ZIRCONIUM CYCLOSILICATE 5 GRAM(S): 5 POWDER, FOR SUSPENSION ORAL at 13:28

## 2024-11-23 RX ADMIN — Medication 1 DROP(S): at 00:11

## 2024-11-23 RX ADMIN — HEPARIN SODIUM 5000 UNIT(S): 1000 INJECTION INTRAVENOUS; SUBCUTANEOUS at 23:06

## 2024-11-23 RX ADMIN — Medication 667 MILLIGRAM(S): at 13:29

## 2024-11-23 RX ADMIN — IPRATROPIUM BROMIDE AND ALBUTEROL SULFATE 3 MILLILITER(S): .5; 2.5 SOLUTION RESPIRATORY (INHALATION) at 21:01

## 2024-11-23 RX ADMIN — IPRATROPIUM BROMIDE AND ALBUTEROL SULFATE 3 MILLILITER(S): .5; 2.5 SOLUTION RESPIRATORY (INHALATION) at 17:32

## 2024-11-23 RX ADMIN — HEPARIN SODIUM 5000 UNIT(S): 1000 INJECTION INTRAVENOUS; SUBCUTANEOUS at 13:29

## 2024-11-23 RX ADMIN — Medication 1 DROP(S): at 23:07

## 2024-11-23 RX ADMIN — Medication 1 DROP(S): at 17:15

## 2024-11-23 RX ADMIN — Medication 1000 MILLILITER(S): at 13:31

## 2024-11-23 RX ADMIN — HEPARIN SODIUM 5000 UNIT(S): 1000 INJECTION INTRAVENOUS; SUBCUTANEOUS at 06:43

## 2024-11-23 NOTE — PROGRESS NOTE ADULT - SUBJECTIVE AND OBJECTIVE BOX
HPI: 45 y/o PMH ?stroke/MI present to [facility] after collapsing at work. Decorticate posturing, vomiting, intubated for airway protection. 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 Transferred to [facility] for further management. s/p trach 10/14. s/p peg 10/21. Re-upgrade to ICU 2/2 desaturation event and suctioning requirements 11/15.     S/Overnight events: No events overnight. Resting comfortably in bed.     Hospital Course:   9/30: transferred from [facility]. A line placed. Versed dc'd. Cy Rader at bedside, states pt has family in [place], cannot confirm medications or PMH other than stroke and MI. 250cc bolus 3% given. LR switched to NS. hydralazine 25q8 started, 3% started, switched propofol to precedex   10/1: stability CTH done. Added labetalol, started TF. Palliative consulted. ethics consulted to determine surrogate. febrile 103, pan cx sent  10/2: BD 2, GEORGE overnight. TF resumed. Desatt'd to 80s, FiO2 inc. to 50. Fentanyl given, ABG, CXR ordered. Maxxed on precedex, started on propofol for DARIEN -4 - -5. Precedex dc'd. Duonebs, mucomyst, hypertonic added. 3% dc'd. Cardene dc'd. Start vanc/CTX. Increased labetalol 200q8. MRSA negative, dc'd vanc. ETT pulled back 2cm x 2, good positioning after confirmatory chest xray. Ethics attempting to establish HCP with family. Na 159, starting FW 250q6 for range 150-155.   10/3: BD3, GEORGE o/n, neuro stable. Na elevating, FW increased to 300q6. Dc'd bowel reg for diarrhea. vEEG started. SQH 5000q8 tonight.   10/4: BD 4, albumn bolus, incr. LR to 80 2/2 incr. in Cr, LR to 100cc/hr for uptrending Cr. Started 7% hypersal for 48hrs and SL atropine for inline/oral thick secretions. Dc'd CTX and started ancef for MSSA in the sputum. Nephrology consulted for CKD, f/u recs. SBP 170s, given hydralazine 10mg IVP.   10/5: BD5, o/n 10mg IVP hydralazine given for SBP 170s and started on hydralazine 25q8 via OGT. 10mg IV push labetalol for SBP > 160s. RT placed for diarrhea.   10/6: BD6, o/n FW increased to 350q4 per nephrology recs. IV tylenol for temp 100.6, SBp 160s presumed uncomfortable.   10/7: BD7, overnight pancultured for temp 101.8F.   10/8: BD8. GEORGE. Cr bumped. decreased LR to 75cc/hr. Adding simethicone ATC. incr hydralazine 50mgTID. Incr labetalol 300mgTID. Na 145, decreased FWF to 250q6. Start precedex. FENa consistent with intrinsic kidney injury. Pend repeat renal US. Retaining up to 1.3L, bladder scans q6, straight cath PRN  10/9: BD 9. GEORGE overnight. Neuro stable. abd xray for distention w non-specific gas pattern, OGT to LIWS for morning. duonebs/mucomyst to q8 for improving secretions. Changed tube feeds to Jevity 1.5 20cc/hr, low rate due to abdominal distention, nepro dense and more difficult to digest. Tolerating CPAP, confirmed by ABG.   10/10: BD 10. GEORGE overnight. Neuro stable. (+) gabriel for urinary retention on bladder scan. inc TF to goal rate of 40cc/hr. family leaning toward pursuing trach/PEG. 1/2 amp for FS 81.   10/11: BD 11. GEORGE overnight. Neuro stable. Trach/PEG consults placed.   10/12: BD 12. GEORGE overnight. Neuro stable. MRI brain complete.   10/13: BD 13. Increase flomax. Hold SQH after PM dose for trach tm. IVL.   10/14: BD 14. GEORGE overnight, remains on AC/VC. Gabriel placed for urinary retention. Dc'd free water.  S/p trach with pulm. NGT placed and CXR confirmed in good position.   10/15: BD 15, GEORGE ovn. resumed feeds. spiked 101, pan cx sent.   10/16: BD 16. GEORGE ovn. Lokelma 5mg for K+ 5.4. Started vanc q 24/zosyn for empiric PNA coverage, IVF to 100/hr. PEG held for fever.   10/17: BD 17,  ordered serum osm and urine osm for am. Started sinemet for neurostimulation. Increased cardura to 0.8. Started FW 100q4, dc'd IVF. MRSA negative, dc'd vanc. NGT replaced d/t coiling.   10/18: BD 18, GEORGE overnight, neuro stable. Amantadine added for neurostim. zosyn changed to unasyn for acinetobacter baumannii, failed TOV and required SC  10/19: BD 19, GEOGRE ovn. cardura 2mg added for retention. labetalol decreased 200q8, hydralazine decreased 25q8. Gabriel replaced.   10/20: BD20, GEORGE overnight. NGT dislodged, replaced. PEG tomorrow w/ gen surg, FW increased to 150q4 and labetalol decreased to 100q8, lokelma given for hyperkalemia.   10/21: BD 21. POD0 PEG placement with Gen surg. decr labetolol to 50q8, incr. cardura to 0.4, started lokelma and phoslo, dc gabriel POD0 PEG placement with Gen surg.  10/22: BD 22. Plan to start TF today via PEG. dc labetalol, Following ophtho recs. Increased apnea settings - found to be in cheyne-moe respiration. CPAP 5/5.  10/23: BD 23. hydralazine d/c'd, trach collar trial today. Rectal tube placed at 6am.  10/24: BD24, o/n lokelma held due to diarrhea. Free water 100q6 resumed. dc'd tamsulosin, amantadine. Incr'd cardura to 8mg qhs. Dc'd FW. Switched jevity to nepro. gabriel placed for high urine output. Started SL atropine for oral secretions. Dc'd free water.  10/25: BD25, o/n decreased suctioning requirements to > q4hrs, GEORGE. Cr improving, cont phoslo, lokelma held at this time. Gabriel placed yest, cont. Tolerating trach collar. Given 500cc plasmalyte bolus for ANIKA. Dc'd sinemet.   10/26: BD26, o/n resumed lokelma 5mg daily and resumed 100cc free water q6hrs. Change in neuro status with new right pupillary dilation with anisocoria (right pupil 6mm fixed and left pupil 3mm briskly reactive). Given 23.4% NaCl bullet, taken for emergent CTH showing mostly resolved pontine hemorrhage, continued brainstem hypodensity likely edema d/t hemorrhage, no new hemorrhage or infarct, no herniation, mild increase in size of left lateral ventricle. Vitals remaine stable. Na goal > 140.   10/27: BD27, o/n GEORGE.Neuro stable. Pend stepdown with airway bed.   10/28: BD 28. GEORGE overnight. Neuro stable. Miralax ordered. Gabriel removed, pending TOV.  10/29: BD 29. GEORGE o/n. Given 2L NS over 8 hrs for increased BUN/Cr ratio. Gabriel placed for frequent straight cath.   10/30: BD 30.   10/31: BD 31. GEORGE overnight. Na 149, increased free water to 200q6. 1L NS for uptrending BUN.   11/1: BD 32. GEORGE overnight. Given 1L NS for dehydration. Na 146, increased FW to 250q6.   11/2: BD 33 GEORGE overnight, neuro stable, given 500cc bolus for net negative status and tachycardia   11/3: BD 34, GEORGE overnight, neuro stable. Patient remains tachycardic, EKG showing sinus tachycardia, given additional 500cc NS bolus. Febrile to 101.9F, pan cultured (without UA), CXR WNL, given tylenol.   11/4: BD 35, GEORGE overnight, neuro stable. Given 1L NS for tachycardia. sputum (+) for stenotropohomas maltophilia.   11/5: BD 36 GEORGE overnight, neuro stable. Vancomycin dc'd. Chest PT BID. ID consulted, cont zosyn.  11/6: BD 37. blood cx + klebsiella dc zosyn changed to cefepime, CTAP ordered, rpt blood cx sent.    11/7: BD 38. Pending CT A/P, given 250cc bolus and starting maintenance fluids overnight. Pending CT A/P after bolus   11/8: BD 39. CT CAP negative for infection.   11/9: BD 40. GEORGE overnight.  11/10: BD 41. GEORGE overnight. desat to 85 on trach collar, O2 inc to 10L and 100%, O2 sat inc to 95. pt tachy to 110s, euvolemic. given tylenol. ABG and CXR ordered. spiked fever, pancultured, RVP negative. AM ABG w pO2 79, rpt w pO2 79. pt appears comfortable, satting 94%.   11/11: BD 42. GEORGE overnight. pt became tachy to 130s, desat to 90 on 100% FiO2 and 10L. suctioned, (+) productive cough. temp 101.4, given 1g IV tylenol and 500cc NS bolus for euvolemia. fever and HR downtrending. LE dopplers negative for dvt  11/12: BD 43, GEORGE ovn, fever and HR downtrending, satting 97% 70% FIO2  11/13: BD 44, GEORGE ovn. started standing tylenol x24 hours for tachycardia. desat to 80s, o2 increased. CXR stable, pending CTA PE protocol.   11/14: BD 45, GEORGE overnight, neuro stable. resp therapy dec FiO2 to 70%.   11/15: BD 46, GEORGE overnight, neuro stable.  Rapid called for desaturation 30s, tachycardic 140s. Patient bagged, 100% fio2, heavily suctioned. CXR/POCUS unremarkable. ABG c/w desaturation. WBC 14.71. Afebrile. O2 improved to 90s and patient upgraded to ICU. ABG paO2 30s improved to 89 on vent. IV Tylenol x 1, sputum sent. Start protonix while o-n vent.   11/16: BD 47. POCUS showed collapsable IVCF, given 1L bolus. Vanco/Cefpime added empriic for PNA, NGT feeds restarted. MRSA swab neg, Vanc DC'd.   11/17: BD 48. GEORGE overnight. 1l bolus for tachycardia. Spiked to 101, cultured. 500cc bolus for tachycardia, tachy to 148 given 25mcg fentanyl, 250cc albumin, 1.5L bolus. 5 IV lopressor with response HR to 100s. +Stenotrophomonas on sputum cx.   11/18: BD 49. GEORGE overnight. Consulted ID, cefepime switched to bactrim x7days. Started hydrochlorothiazine 12.5mg daily.  11/19: BD 50. Tachy 120s, given tylenol and 500cc NS. Tolerating 5/5, switched to TCx. Holding phos binder. D/c Bactrim. D/c gabriel, f/u TOV. Dc'd PPI.   11/20: BD 51. GEORGE ovn. 1600 satting low 90s, mildly tachy to 110s, afebrile, RR wnl. O2 improved to mid 90s while inc O2 to 100% on TCx. CPAP 5/5 placed back on.  11/21: BD 52, GEORGE ovn, tolerating CPAP 5/5. Switch to trach collar during the day if tolerating well. HCTZ held for Cr bump, straight cath frequence increased to q4  11/22: BD 53, GEORGE ovn. Resumed phoslo. Gabriel placed. Resumed HCTZ.     Vital Signs Last 24 Hrs  T(C): 36.8 (22 Nov 2024 21:53), Max: 37.4 (22 Nov 2024 09:03)  T(F): 98.2 (22 Nov 2024 21:53), Max: 99.3 (22 Nov 2024 09:03)  HR: 97 (22 Nov 2024 21:10) (78 - 112)  BP: 116/83 (22 Nov 2024 21:00) (114/76 - 154/104)  BP(mean): 97 (22 Nov 2024 21:00) (90 - 124)  RR: 13 (22 Nov 2024 21:10) (13 - 16)  SpO2: 98% (22 Nov 2024 21:10) (91% - 100%)    Parameters below as of 22 Nov 2024 21:10  Patient On (Oxygen Delivery Method): ventilator, /CPAP-PS    O2 Concentration (%): 40    I&O's Detail    21 Nov 2024 07:01  -  22 Nov 2024 07:00  --------------------------------------------------------  IN:    Free Water: 1000 mL    Nepro with Carb Steady: 900 mL  Total IN: 1900 mL    OUT:    Intermittent Catheterization - Urethral (mL): 2350 mL  Total OUT: 2350 mL    Total NET: -450 mL      22 Nov 2024 07:01  -  23 Nov 2024 00:38  --------------------------------------------------------  IN:    Free Water: 500 mL    Nepro with Carb Steady: 420 mL  Total IN: 920 mL    OUT:    Indwelling Catheter - Urethral (mL): 1100 mL  Total OUT: 1100 mL    Total NET: -180 mL        I&O's Summary    21 Nov 2024 07:01  -  22 Nov 2024 07:00  --------------------------------------------------------  IN: 1900 mL / OUT: 2350 mL / NET: -450 mL    22 Nov 2024 07:01  -  23 Nov 2024 00:38  --------------------------------------------------------  IN: 920 mL / OUT: 1100 mL / NET: -180 mL        PHYSICAL EXAM:  Constitutional: NAD, resting comfortably in bed.   HEENT: Pupils equal, round, reactive to light.   Respiratory: +Trach, CPAPing. No respiratory distress, good chest rise.  Cardiovascular: RRR, S1, S2.   Gastrointestinal: +PEG. Abdomen soft, non tender, nondistended.  Neurological:  AAOX0. Opens eyes spontaneously. Tracks with eyes vertically.  Cranial Nerves: II-XII intact  Motor: RUE intermittently shows 2 fingers. LUE trace w/d. B/l LE withdraw to noxious  Extremities: Warm, well perfused.  Wounds:     TUBES/LINES:  [] CVC  [] A-line  [] Lumbar Drain  [] Ventriculostomy  [x] Gabriel  [] Other    DIET:  [] NPO  [] Mechanical  [x] Tube feeds    LABS:                        11.2   6.53  )-----------( 226      ( 22 Nov 2024 05:30 )             35.6     11-22    138  |  104  |  76[H]  ----------------------------<  132[H]  4.4   |  20[L]  |  2.33[H]    Ca    9.4      22 Nov 2024 05:30  Phos  4.9     11-22  Mg     2.4     11-22        Urinalysis Basic - ( 22 Nov 2024 05:30 )    Color: x / Appearance: x / SG: x / pH: x  Gluc: 132 mg/dL / Ketone: x  / Bili: x / Urobili: x   Blood: x / Protein: x / Nitrite: x   Leuk Esterase: x / RBC: x / WBC x   Sq Epi: x / Non Sq Epi: x / Bacteria: x          CAPILLARY BLOOD GLUCOSE      POCT Blood Glucose.: 110 mg/dL (22 Nov 2024 17:29)  POCT Blood Glucose.: 113 mg/dL (22 Nov 2024 11:20)  POCT Blood Glucose.: 123 mg/dL (22 Nov 2024 05:43)      Drug Levels: [] N/A    CSF Analysis: [] N/A      Allergies    Allergy Status Unknown    Intolerances      MEDICATIONS:  Antibiotics:    Neuro:  acetaminophen   Oral Liquid .. 650 milliGRAM(s) Oral every 6 hours PRN    Anticoagulation:  heparin   Injectable 5000 Unit(s) SubCutaneous every 8 hours    OTHER:  acetylcysteine 10%  Inhalation 4 milliLiter(s) Inhalation every 8 hours  albuterol/ipratropium for Nebulization 3 milliLiter(s) Nebulizer every 8 hours  amLODIPine   Tablet 10 milliGRAM(s) Oral daily  artificial tears (preservative free) Ophthalmic Solution 1 Drop(s) Right EYE every 4 hours  chlorhexidine 0.12% Liquid 15 milliLiter(s) Oral Mucosa every 12 hours  chlorhexidine 2% Cloths 1 Application(s) Topical <User Schedule>  doxazosin 8 milliGRAM(s) Oral at bedtime  hydrochlorothiazide 12.5 milliGRAM(s) Oral daily  insulin lispro (ADMELOG) corrective regimen sliding scale   SubCutaneous every 6 hours  petrolatum Ophthalmic Ointment 1 Application(s) Both EYES two times a day  polyethylene glycol 3350 17 Gram(s) Oral two times a day  sodium zirconium cyclosilicate 5 Gram(s) Oral daily    IVF:  calcium acetate 667 milliGRAM(s) Oral every 24 hours    CULTURES:  Culture Results:   No growth at 5 days (11-17 @ 05:59)  Culture Results:   Mixed gram negative rods including  Moderate Stenotrophomonas maltophilia  Commensal iram consistent with body site (11-16 @ 01:53)    RADIOLOGY & ADDITIONAL TESTS:      ASSESSMENT:  45 y/o PMH ?stroke/MI present to [facility] after collapsing at work. Decorticate posturing, vomiting, intubated for airway protection. 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 Transferred to [facility] for further management. s/p trach 10/14. s/p peg 10/21. Re-upgrade to ICU 2/2 desaturation event and suctioning requirements 11/15.     PLAN:  Neuro:  - neuro q4/vitals q4  - pain control: tylenol prn  - vEEG (10/3-4)- negative, (10/17-10/19) - negative  - CTH 9/30: enlarged pontine hemorrhage, CTH 10/3: stable, CTH 10/25: mostly resolved pontine hemorrhage, no new hemorrhage or infarct, no herniation  - MRI brain 10/12: parenchymal hemorrhage, acute/subacute R cerebellar stroke    - stroke core measures, stroke neuro signed off    CV:  - -160  - HTN: amlodipine 10 mg qd, HCTZ 12.5 daily   - echo (9/30) EF 75%    Resp:  - trach collar w/ cpap 5/5 overnight  - Secretions: duonebs/mucomyst/chest PT q8h   - CTA chest 11/13 negative for PE, inc ground glass opacities    GI:  - TF via PEG (placed 10/21 by gen surg)  - bowel regimen, last BM 11/20  - CTAP 11/8 negative for infection      Renal:  - IVL  - FW flushes 250cc q6hrs   - hyperK 10/20: lokelma 5mg qd  - Hyperphos: added phoslo qd  - +gabriel  - Urinary retenion: Cardura 8 mg   - CKD: trend BUN/Cr  - renal US 10/1: echogenicity c/w chronic med renal dz, repeat 10/8: inc renal echogeicity, c/f medical renal disease w increased hydro     Endo:  - ISS  -  A1c 5.4    Heme:  - DVT ppx: SCDs, SQH 5000u q8h   - LE dopplers negative 11/11    ID:  - febrile, last pancx 11/16  -  s/p Stenotrophomonas maltophilia PNA: s/p Bactrim (11/18-11/19) s/p Cefepime (11/16-11/18)  - MRSA swab neg 11/16   - pan cultured 11/3, (+) sputum for stenotrophomas maltophlia, blood cx (+) klebsiella, cefepime 2gq12 (11/6 - 11/12)   - empiric tx: s/p zosyn (11/3-11/6) , s/p vanc  (11/3- 11/5)  - S/p Ancef (10/4-10/14) for PNA, and s/p Unasyn (10/18-10/23) +actinobacter baumanii    MISC:  - ophtho consult for keratitis, s/p erythromycin ointment to rt eye q4hrs, ofloxacin ointment to rt eye QID, artificial tears to rt eye q2hrs, moisture chamber at bedtime    Dispo: SD status, full code, pending placement and emergency medicaid     D/w Dr. D'Amico and Abdirizak

## 2024-11-23 NOTE — PROGRESS NOTE ADULT - ASSESSMENT
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; respiratory failure s/p trach/peg readmit for hypoxia 2/2 presumed mucous plugging      NEURO:  NIHSS >25; poor functional recovery anticipated; family aware   -neuro check q6  -PT/OT following  -speech following   -PMNR following   -stroke to follow     PULMONARY:  hypoxia insetting of secretions;   Mucomyst & Duoneb to q8, chest PT   place back on CPAP 10/7 40%     CARDIOVASCULAR:  monitor on telemetry   vitals q4  sbp goal 100-160 ; diastolic pressure >80;c/w  HCTZ 12.5mg daily; c/w amlodipine 10mg     GASTROENTEROLOGY:  PEG in place; c/w tube feeds (nephro)  ensure BMs w/ Miralax & senna-lokelma w/ hold parameters for hyperkalemia 2/2 renal disease     RENAL/:  CKD   -free water 250mg q6hrs   -check BMP qd  -strict i/o's ;  -Na goal 135-145  gabriel placement for urinary retention   c/w HCTZ 12.5mg qd  hyperphosphatemia in setting of CKD; restart calcium acetate for binding; at lower dose 667mg q24    ENDOCRINE:  Diabetes Mellitus  A1c- 5.3  Monitor FSG q6 hrs while w/ tube feeds   Hypothyroidism   TSH-1.23      HEME/ONC:  DVT ppx: heparin SQ  b/l SCDs    INFECTIOUS:  monitor off abx id sign off sputum culture Stenotrophomonas likely colonization       DISPO: recommended for IRON- (only Emergency Medicaid) pending RUCOL ; social work & CM following

## 2024-11-23 NOTE — PROGRESS NOTE ADULT - SUBJECTIVE AND OBJECTIVE BOX
INTERVAL HISTORY: HPI:  Unknown age male, unknown past medical history (reported stroke and MI by coworkers) presented to [facility] with AMS, Pt was working at [employer] when he was found down by coworkers. EMS called and pt brought to [facility] ED. Intubated, sedated, started on Cardene for SBPs in 200s. CT head showed brain stem bleed. Transferred to NSICU for further management.  (30 Sep 2024 12:55)    Drug Dosing Weight  Height (cm): 172.7 (30 Sep 2024 08:39)  Weight (kg): 80 (30 Sep 2024 09:04)  BMI (kg/m2): 26.8 (30 Sep 2024 09:04)  BSA (m2): 1.94 (30 Sep 2024 09:04)    PAST MEDICAL & SURGICAL HISTORY:  Acute myocardial infarction  CVA (cerebral vascular accident)    REVIEW OF SYSTEMS: [ ] Unable to Assess due to neurologic exam   [ ] All ROS addressed below are non-contributory, except:  Neuro: [ ] Headache [ ] Back pain [ ] Numbness [ ] Weakness [ ] Ataxia [ ] Dizziness [ ] Aphasia [ ] Dysarthria [ ] Visual disturbance  Resp: [ ] Shortness of breath/dyspnea, [ ] Orthopnea [ ] Cough  CV: [ ] Chest pain [ ] Palpitation [ ] Lightheadedness [ ] Syncope  Renal: [ ] Thirst [ ] Edema  GI: [ ] Nausea [ ] Emesis [ ] Abdominal pain [ ] Constipation [ ] Diarrhea  Hem: [ ] Hematemesis [ ] bright red blood per rectum  ID: [ ] Fever [ ] Chills [ ] Dysuria  ENT: [ ] Rhinorrhea    PHYSICAL EXAM:  General: No Acute Distress, trach collar   Neurological: eyes open spontaneously, tracks vertically, RUE  wiggles fingers to commands, RLE wiggles to command  b/l LE WD, LUE extension, RUE WD, pupils b/l 3mm reactive, cough gag (+)  Pulmonary: Clear to Auscultation, No Rales, No Rhonchi, No Wheezes   Cardiovascular: S1, S2, Regular Rate and Rhythm   Gastrointestinal: Soft, Nontender, Nondistended   Extremities: No calf tenderness             ICU Vital Signs Last 24 Hrs  T(C): 37.9 (23 Nov 2024 14:02), Max: 37.9 (23 Nov 2024 14:02)  T(F): 100.2 (23 Nov 2024 14:02), Max: 100.2 (23 Nov 2024 14:02)  HR: 110 (23 Nov 2024 13:36) (97 - 117)  BP: 116/79 (23 Nov 2024 12:00) (109/80 - 127/86)  BP(mean): 94 (23 Nov 2024 12:00) (89 - 101)  ABP: --  ABP(mean): --  RR: 15 (23 Nov 2024 13:36) (13 - 20)  SpO2: 98% (23 Nov 2024 13:36) (94% - 100%)      11-22-24 @ 07:01  -  11-23-24 @ 07:00  --------------------------------------------------------  IN: 1340 mL / OUT: 1750 mL / NET: -410 mL    11-23-24 @ 07:01  -  11-23-24 @ 16:41  --------------------------------------------------------  IN: 0 mL / OUT: 350 mL / NET: -350 mL        Mode: CPAP with PS, FiO2: 40, PEEP: 5, PS: 5, ITime: 1, MAP: 6.9, PIP: 13    acetylcysteine 10%  Inhalation 4 milliLiter(s) Inhalation every 8 hours  albuterol/ipratropium for Nebulization 3 milliLiter(s) Nebulizer every 8 hours  amLODIPine   Tablet 10 milliGRAM(s) Oral daily  artificial tears (preservative free) Ophthalmic Solution 1 Drop(s) Right EYE every 4 hours  calcium acetate 667 milliGRAM(s) Oral every 24 hours  chlorhexidine 0.12% Liquid 15 milliLiter(s) Oral Mucosa every 12 hours  chlorhexidine 2% Cloths 1 Application(s) Topical <User Schedule>  doxazosin 8 milliGRAM(s) Oral at bedtime  heparin   Injectable 5000 Unit(s) SubCutaneous every 8 hours  hydrochlorothiazide 12.5 milliGRAM(s) Oral daily  insulin lispro (ADMELOG) corrective regimen sliding scale   SubCutaneous every 6 hours  petrolatum Ophthalmic Ointment 1 Application(s) Both EYES two times a day  polyethylene glycol 3350 17 Gram(s) Oral two times a day  sevelamer carbonate 800 milliGRAM(s) Oral every 12 hours  sodium zirconium cyclosilicate 5 Gram(s) Oral daily      LABS:  Na: 139 (11-23 @ 05:30), 138 (11-22 @ 05:30), 140 (11-21 @ 05:30)  K: 4.6 (11-23 @ 05:30), 4.4 (11-22 @ 05:30), 4.4 (11-21 @ 05:30)  Cl: 103 (11-23 @ 05:30), 104 (11-22 @ 05:30), 105 (11-21 @ 05:30)  CO2: 19 (11-23 @ 05:30), 20 (11-22 @ 05:30), 22 (11-21 @ 05:30)  BUN: 79 (11-23 @ 05:30), 76 (11-22 @ 05:30), 61 (11-21 @ 09:18), 58 (11-21 @ 05:30)  Cr: 2.15 (11-23 @ 05:30), 2.33 (11-22 @ 05:30), 2.14 (11-21 @ 09:18), 2.26 (11-21 @ 05:30)  Glu: 124(11-23 @ 05:30), 132(11-22 @ 05:30), 119(11-21 @ 05:30)    Hgb: 12.1 (11-23 @ 05:30), 11.2 (11-22 @ 05:30), 11.2 (11-21 @ 05:30)  Hct: 37.4 (11-23 @ 05:30), 35.6 (11-22 @ 05:30), 34.6 (11-21 @ 05:30)  WBC: 8.38 (11-23 @ 05:30), 6.53 (11-22 @ 05:30), 5.88 (11-21 @ 05:30)  Plt: 268 (11-23 @ 05:30), 226 (11-22 @ 05:30), 229 (11-21 @ 05:30)      ABG - ( 22 Nov 2024 14:36 )  pH, Arterial: 7.40  pH, Blood: x     /  pCO2: 36    /  pO2: 108   / HCO3: 22    / Base Excess: -2.0  /  SaO2: 99.3

## 2024-11-24 LAB
ALBUMIN SERPL ELPH-MCNC: 3.7 G/DL — SIGNIFICANT CHANGE UP (ref 3.3–5)
ALP SERPL-CCNC: 136 U/L — HIGH (ref 40–120)
ALT FLD-CCNC: 33 U/L — SIGNIFICANT CHANGE UP (ref 10–45)
ANION GAP SERPL CALC-SCNC: 11 MMOL/L — SIGNIFICANT CHANGE UP (ref 5–17)
ANION GAP SERPL CALC-SCNC: 12 MMOL/L — SIGNIFICANT CHANGE UP (ref 5–17)
AST SERPL-CCNC: 19 U/L — SIGNIFICANT CHANGE UP (ref 10–40)
BILIRUB SERPL-MCNC: 0.4 MG/DL — SIGNIFICANT CHANGE UP (ref 0.2–1.2)
BUN SERPL-MCNC: 74 MG/DL — HIGH (ref 7–23)
BUN SERPL-MCNC: 79 MG/DL — HIGH (ref 7–23)
CALCIUM SERPL-MCNC: 9 MG/DL — SIGNIFICANT CHANGE UP (ref 8.4–10.5)
CALCIUM SERPL-MCNC: 9.2 MG/DL — SIGNIFICANT CHANGE UP (ref 8.4–10.5)
CHLORIDE SERPL-SCNC: 106 MMOL/L — SIGNIFICANT CHANGE UP (ref 96–108)
CHLORIDE SERPL-SCNC: 106 MMOL/L — SIGNIFICANT CHANGE UP (ref 96–108)
CO2 SERPL-SCNC: 22 MMOL/L — SIGNIFICANT CHANGE UP (ref 22–31)
CO2 SERPL-SCNC: 23 MMOL/L — SIGNIFICANT CHANGE UP (ref 22–31)
CREAT SERPL-MCNC: 1.72 MG/DL — HIGH (ref 0.5–1.3)
CREAT SERPL-MCNC: 1.88 MG/DL — HIGH (ref 0.5–1.3)
EGFR: 44 ML/MIN/1.73M2 — LOW
EGFR: 44 ML/MIN/1.73M2 — LOW
EGFR: 49 ML/MIN/1.73M2 — LOW
EGFR: 49 ML/MIN/1.73M2 — LOW
GLUCOSE SERPL-MCNC: 130 MG/DL — HIGH (ref 70–99)
GLUCOSE SERPL-MCNC: 147 MG/DL — HIGH (ref 70–99)
HCT VFR BLD CALC: 35.3 % — LOW (ref 39–50)
HGB BLD-MCNC: 10.9 G/DL — LOW (ref 13–17)
MAGNESIUM SERPL-MCNC: 2.4 MG/DL — SIGNIFICANT CHANGE UP (ref 1.6–2.6)
MAGNESIUM SERPL-MCNC: 2.4 MG/DL — SIGNIFICANT CHANGE UP (ref 1.6–2.6)
MCHC RBC-ENTMCNC: 28.8 PG — SIGNIFICANT CHANGE UP (ref 27–34)
MCHC RBC-ENTMCNC: 30.9 G/DL — LOW (ref 32–36)
MCV RBC AUTO: 93.1 FL — SIGNIFICANT CHANGE UP (ref 80–100)
NRBC # BLD: 0 /100 WBCS — SIGNIFICANT CHANGE UP (ref 0–0)
NRBC BLD-RTO: 0 /100 WBCS — SIGNIFICANT CHANGE UP (ref 0–0)
PHOSPHATE SERPL-MCNC: 4.2 MG/DL — SIGNIFICANT CHANGE UP (ref 2.5–4.5)
PHOSPHATE SERPL-MCNC: 5.5 MG/DL — HIGH (ref 2.5–4.5)
PLATELET # BLD AUTO: 246 K/UL — SIGNIFICANT CHANGE UP (ref 150–400)
POTASSIUM SERPL-MCNC: 3.7 MMOL/L — SIGNIFICANT CHANGE UP (ref 3.5–5.3)
POTASSIUM SERPL-MCNC: 3.9 MMOL/L — SIGNIFICANT CHANGE UP (ref 3.5–5.3)
POTASSIUM SERPL-SCNC: 3.7 MMOL/L — SIGNIFICANT CHANGE UP (ref 3.5–5.3)
POTASSIUM SERPL-SCNC: 3.9 MMOL/L — SIGNIFICANT CHANGE UP (ref 3.5–5.3)
PROT SERPL-MCNC: 7.4 G/DL — SIGNIFICANT CHANGE UP (ref 6–8.3)
RBC # BLD: 3.79 M/UL — LOW (ref 4.2–5.8)
RBC # FLD: 14.6 % — HIGH (ref 10.3–14.5)
SODIUM SERPL-SCNC: 139 MMOL/L — SIGNIFICANT CHANGE UP (ref 135–145)
SODIUM SERPL-SCNC: 141 MMOL/L — SIGNIFICANT CHANGE UP (ref 135–145)
WBC # BLD: 9.68 K/UL — SIGNIFICANT CHANGE UP (ref 3.8–10.5)
WBC # FLD AUTO: 9.68 K/UL — SIGNIFICANT CHANGE UP (ref 3.8–10.5)

## 2024-11-24 PROCEDURE — 99232 SBSQ HOSP IP/OBS MODERATE 35: CPT

## 2024-11-24 RX ORDER — LISINOPRIL 5 MG/1
5 TABLET ORAL DAILY
Refills: 0 | Status: DISCONTINUED | OUTPATIENT
Start: 2024-11-25 | End: 2024-11-25

## 2024-11-24 RX ORDER — SEVELAMER HYDROCHLORIDE 800 MG/1
800 TABLET ORAL
Refills: 0 | Status: COMPLETED | OUTPATIENT
Start: 2024-11-24 | End: 2024-11-25

## 2024-11-24 RX ADMIN — Medication 1 APPLICATION(S): at 05:13

## 2024-11-24 RX ADMIN — Medication 1 APPLICATION(S): at 17:47

## 2024-11-24 RX ADMIN — Medication 1 DROP(S): at 05:12

## 2024-11-24 RX ADMIN — ACETYLCYSTEINE 4 MILLILITER(S): 200 INHALANT RESPIRATORY (INHALATION) at 13:34

## 2024-11-24 RX ADMIN — AMLODIPINE BESYLATE 10 MILLIGRAM(S): 10 TABLET ORAL at 05:12

## 2024-11-24 RX ADMIN — Medication 1 DROP(S): at 21:18

## 2024-11-24 RX ADMIN — HEPARIN SODIUM 5000 UNIT(S): 1000 INJECTION INTRAVENOUS; SUBCUTANEOUS at 05:12

## 2024-11-24 RX ADMIN — SODIUM ZIRCONIUM CYCLOSILICATE 5 GRAM(S): 5 POWDER, FOR SUSPENSION ORAL at 11:25

## 2024-11-24 RX ADMIN — SEVELAMER HYDROCHLORIDE 800 MILLIGRAM(S): 800 TABLET ORAL at 05:12

## 2024-11-24 RX ADMIN — Medication 1 DROP(S): at 05:18

## 2024-11-24 RX ADMIN — IPRATROPIUM BROMIDE AND ALBUTEROL SULFATE 3 MILLILITER(S): .5; 2.5 SOLUTION RESPIRATORY (INHALATION) at 06:22

## 2024-11-24 RX ADMIN — IPRATROPIUM BROMIDE AND ALBUTEROL SULFATE 3 MILLILITER(S): .5; 2.5 SOLUTION RESPIRATORY (INHALATION) at 13:34

## 2024-11-24 RX ADMIN — Medication 15 MILLILITER(S): at 05:11

## 2024-11-24 RX ADMIN — HEPARIN SODIUM 5000 UNIT(S): 1000 INJECTION INTRAVENOUS; SUBCUTANEOUS at 13:26

## 2024-11-24 RX ADMIN — Medication 40 MILLIEQUIVALENT(S): at 07:05

## 2024-11-24 RX ADMIN — ACETYLCYSTEINE 4 MILLILITER(S): 200 INHALANT RESPIRATORY (INHALATION) at 06:22

## 2024-11-24 RX ADMIN — Medication 1 DROP(S): at 17:51

## 2024-11-24 RX ADMIN — DOXAZOSIN MESYLATE 8 MILLIGRAM(S): 8 TABLET ORAL at 21:18

## 2024-11-24 RX ADMIN — Medication 1 DROP(S): at 13:33

## 2024-11-24 RX ADMIN — Medication 667 MILLIGRAM(S): at 17:51

## 2024-11-24 RX ADMIN — SEVELAMER HYDROCHLORIDE 800 MILLIGRAM(S): 800 TABLET ORAL at 17:51

## 2024-11-24 RX ADMIN — ACETYLCYSTEINE 4 MILLILITER(S): 200 INHALANT RESPIRATORY (INHALATION) at 21:11

## 2024-11-24 RX ADMIN — HEPARIN SODIUM 5000 UNIT(S): 1000 INJECTION INTRAVENOUS; SUBCUTANEOUS at 21:18

## 2024-11-24 RX ADMIN — Medication 15 MILLILITER(S): at 17:51

## 2024-11-24 RX ADMIN — IPRATROPIUM BROMIDE AND ALBUTEROL SULFATE 3 MILLILITER(S): .5; 2.5 SOLUTION RESPIRATORY (INHALATION) at 21:11

## 2024-11-24 NOTE — PROGRESS NOTE ADULT - SUBJECTIVE AND OBJECTIVE BOX
NEUROCRITICAL CARE PROGRESS NOTE    GARETT DELEON   MRN-[id number]  Summary:  /  HPI:  Unknown age male, unknown past medical history (reported stroke and MI by coworkers) presented to [facility] with AMS, Pt was working at [employer] when he was found down by coworkers. EMS called and pt brought to [facility] ED. Intubated, sedated, started on cardene for SBPs in 200s. CT head showed brain stem bleed. Transferred to NSICU for further management.  (30 Sep 2024 12:55)      S/Overnight events:  BD 55. GEORGE overnight. Neuro stable.    Hospital Course:  : transferred from [facility]. A line placed. Versed dc'd. Roomate Prasanth at bedside, states pt has family in [place], cannot confirm medications or PMH other than stroke and MI. 250cc bolus 3% given. LR switched to NS. hydralazine 25q8 started, 3% started, switched propofol to precedex   10/1: stability CTH done. Added labetalol, started TF. Palliative consulted. ethics consulted to determine surrogate. febrile 103, pan cx sent  10/2: BD 2, GEORGE overnight. TF resumed. Desatt'd to 80s, FiO2 inc. to 50. Fentanyl given, ABG, CXR ordered. Maxxed on precedex, started on propofol for DARIEN -4 - -5. Precedex dc'd. Duonebs, mucomyst, hypertonic added. 3% dc'd. Cardene dc'd. Start vanc/CTX. Increased labetalol 200q8. MRSA negative, dc'd vanc. ETT pulled back 2cm x 2, good positioning after confirmatory chest xray. Ethics attempting to establish HCP with family. Na 159, starting FW 250q6 for range 150-155.   10/3: BD3, GEORGE o/n, neuro stable. Na elevating, FW increased to 300q6. Dc'd bowel reg for diarrhea. vEEG started. SQH 5000q8 tonight.   10/4: BD 4, albumn bolus, incr. LR to 80 2/2 incr. in Cr, LR to 100cc/hr for uptrending Cr. Started 7% hypersal for 48hrs and SL atropine for inline/oral thick secretions. Dc'd CTX and started ancef for MSSA in the sputum. Nephrology consulted for CKD, f/u recs. SBP 170s, given hydralazine 10mg IVP.   10/5: BD5, o/n 10mg IVP hydralazine given for SBP 170s and started on hydralazine 25q8 via OGT. 10mg IV push labetalol for SBP > 160s. RT placed for diarrhea.   10/6: BD6, o/n FW increased to 350q4 per nephrology recs. IV tylenol for temp 100.6, SBp 160s presumed uncomfortable.   10/7: BD7, overnight pancultured for temp 101.8F.   10/8: BD8. GEORGE. Cr bumped. decreased LR to 75cc/hr. Adding simethicone ATC. incr hydralazine 50mgTID. Incr labetalol 300mgTID. Na 145, decreased FWF to 250q6. Start precedex. FENa consistent with intrinsic kidney injury. Pend repeat renal US. Retaining up to 1.3L, bladder scans q6, straight cath PRN  10/9: BD 9. GEORGE overnight. Neuro stable. abd xray for distention w non-specific gas pattern, OGT to LIWS for morning. duonebs/mucomyst to q8 for improving secretions. Changed tube feeds to Jevity 1.5 20cc/hr, low rate due to abdominal distention, nepro dense and more difficult to digest. Tolerating CPAP, confirmed by ABG.   10/10: BD 10. GEORGE overnight. Neuro stable. (+) gabriel for urinary retention on bladder scan. inc TF to goal rate of 40cc/hr. family leaning toward pursuing trach/PEG. 1/2 amp for FS 81.   10/11: BD 11. GEORGE overnight. Neuro stable. Trach/PEG consults placed.   10/12: BD 12. GEORGE overnight. Neuro stable. MRI brain complete.   10/13: BD 13. Increase flomax. Hold SQH after PM dose for trach tm. IVL.   10/14: BD 14. GEORGE overnight, remains on AC/VC. Gabriel placed for urinary retention. Dc'd free water.  S/p trach with pulm. NGT placed and CXR confirmed in good position.   10/15: BD 15, GEORGE ovn. resumed feeds. spiked 101, pan cx sent.   10/16: BD 16. GEORGE ovn. Lokelma 5mg for K+ 5.4. Started vanc q 24/zosyn for empiric PNA coverage, IVF to 100/hr. PEG held for fever.   10/17: BD 17,  ordered serum osm and urine osm for am. Started sinemet for neurostimulation. Increased cardura to 0.8. Started FW 100q4, dc'd IVF. MRSA negative, dc'd vanc. NGT replaced d/t coiling.   10/18: BD 18, GEORGE overnight, neuro stable. Amantadine added for neurostim. zosyn changed to unasyn for acinetobacter baumannii, failed TOV and required SC  10/19: BD 19, GEORGE ovn. cardura 2mg added for retention. labetalol decreased 200q8, hydralazine decreased 25q8. Gabriel replaced.   10/20: BD20, GEORGE overnight. NGT dislodged, replaced. PEG tomorrow w/ gen surg, FW increased to 150q4 and labetalol decreased to 100q8, lokelma given for hyperkalemia.   10/21: BD 21. POD0 PEG placement with Gen surg. decr labetolol to 50q8, incr. cardura to 0.4, started lokelma and phoslo, dc gabriel POD0 PEG placement with Gen surg.  10/22: BD 22. Plan to start TF today via PEG. dc labetalol, Following ophtho recs. Increased apnea settings - found to be in cheyne-moe respiration. CPAP /5.  10/23: BD 23. hydralazine d/c'd, trach collar trial today. Rectal tube placed at 6am.  10/24: BD24, o/n lokelma held due to diarrhea. Free water 100q6 resumed. dc'd tamsulosin, amantadine. Incr'd cardura to 8mg qhs. Dc'd FW. Switched jevity to nepro. gabriel placed for high urine output. Started SL atropine for oral secretions. Dc'd free water.  10/25: BD25, o/n decreased suctioning requirements to > q4hrs, GEORGE. Cr improving, cont phoslo, lokelma held at this time. Gabriel placed yest, cont. Tolerating trach collar. Given 500cc plasmalyte bolus for ANIKA. Dc'd sinemet.   10/26: BD26, o/n resumed lokelma 5mg daily and resumed 100cc free water q6hrs. Change in neuro status with new right pupillary dilation with anisocoria (right pupil 6mm fixed and left pupil 3mm briskly reactive). Given 23.4% NaCl bullet, taken for emergent CTH showing mostly resolved pontine hemorrhage, continued brainstem hypodensity likely edema d/t hemorrhage, no new hemorrhage or infarct, no herniation, mild increase in size of left lateral ventricle. Vitals remaine stable. Na goal > 140.   10/27: BD27, o/n GEORGE.Neuro stable. Pend stepdown with airway bed.   10/28: BD 28. GEORGE overnight. Neuro stable. Miralax ordered. Gabriel removed, pending TOV.  10/29: BD 29. GEORGE o/n. Given 2L NS over 8 hrs for increased BUN/Cr ratio. Gabriel placed for frequent straight cath.   10/30: BD 30.   10/31: BD 31. GEORGE overnight. Na 149, increased free water to 200q6. 1L NS for uptrending BUN.   : BD 32. GEORGE overnight. Given 1L NS for dehydration. Na 146, increased FW to 250q6.   : BD 33 GEORGE overnight, neuro stable, given 500cc bolus for net negative status and tachycardia   11/3: BD 34, GEORGE overnight, neuro stable. Patient remains tachycardic, EKG showing sinus tachycardia, given additional 500cc NS bolus. Febrile to 101.9F, pan cultured (without UA), CXR WNL, given tylenol.   : BD 35, GEORGE overnight, neuro stable. Given 1L NS for tachycardia. sputum (+) for stenotropohomas maltophilia.   : BD 36 GEORGE overnight, neuro stable. Vancomycin dc'd. Chest PT BID. ID consulted, cont zosyn.  : BD 37. blood cx + klebsiella dc zosyn changed to cefepime, CTAP ordered, rpt blood cx sent.    : BD 38. Pending CT A/P, given 250cc bolus and starting maintenance fluids overnight. Pending CT A/P after bolus   : BD 39. CT CAP negative for infection.   : BD 40. GEORGE overnight.  11/10: BD 41. GEORGE overnight. desat to 85 on trach collar, O2 inc to 10L and 100%, O2 sat inc to 95. pt tachy to 110s, euvolemic. given tylenol. ABG and CXR ordered. spiked fever, pancultured, RVP negative. AM ABG w pO2 79, rpt w pO2 79. pt appears comfortable, satting 94%.   : BD 42. GEORGE overnight. pt became tachy to 130s, desat to 90 on 100% FiO2 and 10L. suctioned, (+) productive cough. temp 101.4, given 1g IV tylenol and 500cc NS bolus for euvolemia. fever and HR downtrending. LE dopplers negative for dvt  : BD 43, GEORGE ovn, fever and HR downtrending, satting 97% 70% FIO2  : BD 44, GEORGE ovn. started standing tylenol x24 hours for tachycardia. desat to 80s, o2 increased. CXR stable, pending CTA PE protocol.   : BD 45, GEORGE overnight, neuro stable. resp therapy dec FiO2 to 70%.   11/15: BD 46, GEORGE overnight, neuro stable.  Rapid called for desaturation 30s, tachycardic 140s. Patient bagged, 100% fio2, heavily suctioned. CXR/POCUS unremarkable. ABG c/w desaturation. WBC 14.71. Afebrile. O2 improved to 90s and patient upgraded to ICU. ABG paO2 30s improved to 89 on vent. IV Tylenol x 1, sputum sent. Start protonix while o-n vent.   : BD 47. POCUS showed collapsable IVCF, given 1L bolus. Vanco/Cefpime added empriic for PNA, NGT feeds restarted. MRSA swab neg, Vanc DC'd.   : BD 48. GEORGE overnight. 1l bolus for tachycardia. Spiked to 101, cultured. 500cc bolus for tachycardia, tachy to 148 given 25mcg fentanyl, 250cc albumin, 1.5L bolus. 5 IV lopressor with response HR to 100s. +Stenotrophomonas on sputum cx.   : BD 49. GEORGE overnight. Consulted ID, cefepime switched to bactrim x7days. Started hydrochlorothiazine 12.5mg daily.  : BD 50. Tachy 120s, given tylenol and 500cc NS. Tolerating 5/5, switched to TCx. Holding phos binder. D/c Bactrim. D/c gabriel, f/u TOV. Dc'd PPI.   : BD 51. GEORGE ovn. 1600 satting low 90s, mildly tachy to 110s, afebrile, RR wnl. O2 improved to mid 90s while inc O2 to 100% on TCx. CPAP 5/5 placed back on.  : BD 52, GEORGE ovn, tolerating CPAP 5/5. Switch to trach collar during the day if tolerating well. HCTZ held for Cr bump, straight cath frequence increased to q4  : BD 53, GEORGE ovn. Resumed phoslo. Gabriel placed. Resumed HCTZ.   : BD 54. Holding tylenol in setting of possible fever, will require pan cx if febrile. Cr improved today. Cont CPAP. Bowel regimen held i/s/o diarrhea.  : BD 55. GEORGE overnight. Neuro stable.    Vital Signs Last 24 Hrs  T(C): 37.9 (2024 21:38), Max: 38.1 (2024 18:00)  T(F): 100.2 (2024 21:38), Max: 100.6 (2024 18:00)  HR: 100 (2024 21:00) (100 - 117)  BP: 123/81 (2024 21:00) (109/80 - 127/86)  BP(mean): 96 (2024 21:00) (89 - 101)  RR: 16 (2024 18:00) (15 - 20)  SpO2: 96% (2024 21:00) (94% - 100%)    Parameters below as of 2024 21:00  Patient On (Oxygen Delivery Method): BiPAP/CPAP    O2 Concentration (%): 40    Mode: CPAP with PS, FiO2: 40, PEEP: 5, PS: 5, MAP: 6.5, PIP: 12    I&O's Detail    2024 07:01  -  2024 07:00  --------------------------------------------------------  IN:    Free Water: 500 mL    Nepro with Carb Steady: 840 mL  Total IN: 1340 mL    OUT:    Indwelling Catheter - Urethral (mL): 1750 mL  Total OUT: 1750 mL    Total NET: -410 mL      2024 07:01  -  2024 21:42  --------------------------------------------------------  IN:    Free Water: 250 mL    Miscellaneous Tube Feedin mL    Sodium Chloride 0.9% Bolus: 1000 mL  Total IN: 1850 mL    OUT:    Indwelling Catheter - Urethral (mL): 1200 mL  Total OUT: 1200 mL    Total NET: 650 mL    PHYSICAL EXAM:  Constitutional: NAD, resting comfortably in bed.   HEENT: Pupils equal, round, reactive to light.   Respiratory: +Trach to vent. No respiratory distress, good chest rise.  Cardiovascular: RRR, S1, S2.   Gastrointestinal: +PEG. Abdomen soft, non tender, nondistended.  Neurological:  Awake and alert. Not oriented. Opens eyes spontaneously. Not following commands.   Cranial Nerves: pupils 3mm equal, round and reactive, tracks vertically, face appears symmetric, no verbal output  Motor: RUE trace movements spontaneously, RLE wiggles toes spontaneously. LUE trace w/d. LLE withdraw to noxious  Extremities: Warm, well perfused.      LABS:                        12.1   8.38  )-----------( 268      ( 2024 05:30 )             37.4         139  |  103  |  79[H]  ----------------------------<  124[H]  4.6   |  19[L]  |  2.15[H]    Ca    9.6      2024 05:30  Phos  5.9       Mg     2.4             Urinalysis Basic - ( 2024 05:30 )    Color: x / Appearance: x / SG: x / pH: x  Gluc: 124 mg/dL / Ketone: x  / Bili: x / Urobili: x   Blood: x / Protein: x / Nitrite: x   Leuk Esterase: x / RBC: x / WBC x   Sq Epi: x / Non Sq Epi: x / Bacteria: x          CAPILLARY BLOOD GLUCOSE      POCT Blood Glucose.: 116 mg/dL (2024 17:44)  POCT Blood Glucose.: 106 mg/dL (2024 11:24)      Drug Levels: [] N/A    CSF Analysis: [] N/A      Allergies    Allergy Status Unknown    Intolerances      MEDICATIONS:  Antibiotics:    Neuro:    Anticoagulation:  heparin   Injectable 5000 Unit(s) SubCutaneous every 8 hours    OTHER:  acetylcysteine 10%  Inhalation 4 milliLiter(s) Inhalation every 8 hours  albuterol/ipratropium for Nebulization 3 milliLiter(s) Nebulizer every 8 hours  amLODIPine   Tablet 10 milliGRAM(s) Oral daily  artificial tears (preservative free) Ophthalmic Solution 1 Drop(s) Right EYE every 4 hours  chlorhexidine 0.12% Liquid 15 milliLiter(s) Oral Mucosa every 12 hours  chlorhexidine 2% Cloths 1 Application(s) Topical <User Schedule>  doxazosin 8 milliGRAM(s) Oral at bedtime  hydrochlorothiazide 12.5 milliGRAM(s) Oral daily  insulin lispro (ADMELOG) corrective regimen sliding scale   SubCutaneous every 6 hours  petrolatum Ophthalmic Ointment 1 Application(s) Both EYES two times a day  sevelamer carbonate 800 milliGRAM(s) Oral every 12 hours  sodium zirconium cyclosilicate 5 Gram(s) Oral daily    IVF:  calcium acetate 667 milliGRAM(s) Oral every 24 hours    CULTURES:  Culture Results:   No growth at 5 days ( @ 05:59)  Culture Results:   Mixed gram negative rods including  Moderate Stenotrophomonas maltophilia  Commensal iram consistent with body site ( @ 01:53)      ASSESSMENT:      43 y/o PMH ?stroke/MI present to [facility] after collapsing at work. Decorticate posturing, vomiting, intubated for airway protection. 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 Transferred to [facility] for further management. s/p trach 10/14. s/p peg 10/21. Re-upgrade to ICU 2/2 desaturation event and suctioning requirements 11/15.     PLAN:  Neuro:  - neuro q4/vitals q4  - pain control: tylenol held i/s/o low grade fever  - vEEG (10/3-)- negative, (10/17-10/19) - negative  - CTH : enlarged pontine hemorrhage, CTH 10/3: stable, CTH 10/25: mostly resolved pontine hemorrhage, no new hemorrhage or infarct, no herniation  - MRI brain 10/12: parenchymal hemorrhage, acute/subacute R cerebellar stroke    - stroke core measures, stroke neuro signed off    CV:  - -160  - HTN: amlodipine 10 mg qd, HCTZ 12.5 daily   - echo () EF 75%    Resp:  - trach collar w/ cpap  overnight  - Secretions: duonebs/mucomyst/chest PT q8h   - CTA chest  negative for PE, inc ground glass opacities    GI:  - TF via PEG (placed 10/21 by gen surg)  - bowel regimen held i/s/o diarrhea, last BM   - CTAP  negative for infection      Renal:  - IVL  - FW flushes 250cc q6hrs for uptrending BUN/Cr  - hyperK: lokelma 5mg qd  - Hyperphos: added phoslo qd, renvela x 2 d (-)  - Urinary retenion: Cardura 8 mg, +gabriel replaced   - CKD: trend BUN/Cr  - renal US 10/1: echogenicity c/w chronic med renal dz, repeat 10/8: inc renal echogeicity, c/f medical renal disease w increased hydro     Endo:  - ISS  - A1c 5.4    Heme:  - DVT ppx: SCDs, SQH 5000u q8h   - LE dopplers negative     ID:  - febrile, last pancx , MRSA swab neg    -  s/p Stenotrophomonas maltophilia PNA: s/p Bactrim (-) s/p Cefepime (-)  - 11/3, (+) sputum for stenotrophomas maltophlia, blood cx (+) klebsiella, cefepime 2gq12 ( - )   - empiric tx: s/p zosyn (11/3-) , s/p vanc  (11/3- )  - S/p Ancef (10/4-10/14) for PNA, and s/p Unasyn (10/18-10/23) +actinobacter baumanii    MISC:  - ophtho consult for keratitis, s/p erythromycin ointment to rt eye q4hrs, ofloxacin ointment to rt eye QID, artificial tears to rt eye q2hrs, moisture chamber at bedtime    Dispo: NSICU status, full code, pending placement and emergency medicaid     D/w Dr. D'Amico and Abdirizak

## 2024-11-24 NOTE — PROGRESS NOTE ADULT - ASSESSMENT
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; respiratory failure s/p trach/peg readmit for hypoxia 2/2 presumed mucous plugging      NEURO:  NIHSS >25; poor functional recovery anticipated; family aware   -neuro check q4  -PT/OT following  -speech following   -PMNR following   -stroke to follow     PULMONARY:  hypoxia insetting of secretions;   Mucomyst & Duoneb to q8, chest PT   CPAP qhCPAP 10/7 40%     CARDIOVASCULAR:  monitor on telemetry   vitals q4  sbp goal 100-160 ; diastolic pressure >80;c/w  c/w amlodipine 10mg , d/c hctz start lisinopril 5mg qd     GASTROENTEROLOGY:  PEG in place; c/w tube feeds (nephro)  ensure BMs w/ Miralax & senna-lokelma w/ hold parameters for hyperkalemia 2/2 renal disease     RENAL/:  CKD   -free water 250mg q6hrs   -check BMP qd  -strict i/o's ;  -Na goal 135-145  gabriel placed for urinary retention   hyperphosphatemia in setting of CKD; restart calcium acetate for binding; increase dose 667mg to BID    ENDOCRINE:  Diabetes Mellitus  A1c- 5.3  Monitor FSG q6 hrs while w/ tube feeds   Hypothyroidism   TSH-1.23      HEME/ONC:  DVT ppx: heparin SQ  b/l SCDs    INFECTIOUS:  monitor off abx id sign off sputum culture Stenotrophomonas likely colonization       DISPO: recommended for IRON- (only Emergency Medicaid) pending RUCOL ; social work & CM following

## 2024-11-24 NOTE — PROGRESS NOTE ADULT - SUBJECTIVE AND OBJECTIVE BOX
INTERVAL HISTORY: HPI:  Unknown age male, unknown past medical history (reported stroke and MI by coworkers) presented to [facility] with AMS, Pt was working at [employer] when he was found down by coworkers. EMS called and pt brought to [facility] ED. Intubated, sedated, started on Cardene for SBPs in 200s. CT head showed brain stem bleed. Transferred to NSICU for further management.  (30 Sep 2024 12:55)    Drug Dosing Weight  Height (cm): 172.7 (30 Sep 2024 08:39)  Weight (kg): 80 (30 Sep 2024 09:04)  BMI (kg/m2): 26.8 (30 Sep 2024 09:04)  BSA (m2): 1.94 (30 Sep 2024 09:04)    PAST MEDICAL & SURGICAL HISTORY:  Acute myocardial infarction  CVA (cerebral vascular accident)    REVIEW OF SYSTEMS: [ ] Unable to Assess due to neurologic exam   [ ] All ROS addressed below are non-contributory, except:  Neuro: [ ] Headache [ ] Back pain [ ] Numbness [ ] Weakness [ ] Ataxia [ ] Dizziness [ ] Aphasia [ ] Dysarthria [ ] Visual disturbance  Resp: [ ] Shortness of breath/dyspnea, [ ] Orthopnea [ ] Cough  CV: [ ] Chest pain [ ] Palpitation [ ] Lightheadedness [ ] Syncope  Renal: [ ] Thirst [ ] Edema  GI: [ ] Nausea [ ] Emesis [ ] Abdominal pain [ ] Constipation [ ] Diarrhea  Hem: [ ] Hematemesis [ ] bright red blood per rectum  ID: [ ] Fever [ ] Chills [ ] Dysuria  ENT: [ ] Rhinorrhea    PHYSICAL EXAM:  General: No Acute Distress, trach collar   Neurological: eyes open spontaneously, tracks vertically, RUE  wiggles fingers to commands, RLE wiggles to command  b/l LE WD, LUE extension, RUE WD, pupils b/l 3mm reactive, cough gag (+)  Pulmonary: Clear to Auscultation, No Rales, No Rhonchi, No Wheezes   Cardiovascular: S1, S2, Regular Rate and Rhythm   Gastrointestinal: Soft, Nontender, Nondistended   Extremities: No calf tenderness             ICU Vital Signs Last 24 Hrs  T(C): 36.7 (24 Nov 2024 04:47), Max: 38.1 (23 Nov 2024 18:00)  T(F): 98.1 (24 Nov 2024 04:47), Max: 100.6 (23 Nov 2024 18:00)  HR: 111 (24 Nov 2024 10:00) (85 - 117)  BP: 101/65 (24 Nov 2024 10:00) (101/62 - 123/81)  BP(mean): 79 (24 Nov 2024 10:00) (76 - 97)  ABP: --  ABP(mean): --  RR: 18 (24 Nov 2024 10:00) (15 - 20)  SpO2: 95% (24 Nov 2024 10:00) (92% - 100%)      11-23-24 @ 07:01  -  11-24-24 @ 07:00  --------------------------------------------------------  IN: 2900 mL / OUT: 2050 mL / NET: 850 mL    11-24-24 @ 07:01  -  11-24-24 @ 10:45  --------------------------------------------------------  IN: 0 mL / OUT: 400 mL / NET: -400 mL        Mode: CPAP with PS, FiO2: 40, PEEP: 5, PS: 5, MAP: 6, PIP: 11    acetylcysteine 10%  Inhalation 4 milliLiter(s) Inhalation every 8 hours  albuterol/ipratropium for Nebulization 3 milliLiter(s) Nebulizer every 8 hours  amLODIPine   Tablet 10 milliGRAM(s) Oral daily  artificial tears (preservative free) Ophthalmic Solution 1 Drop(s) Right EYE every 4 hours  chlorhexidine 0.12% Liquid 15 milliLiter(s) Oral Mucosa every 12 hours  chlorhexidine 2% Cloths 1 Application(s) Topical <User Schedule>  doxazosin 8 milliGRAM(s) Oral at bedtime  heparin   Injectable 5000 Unit(s) SubCutaneous every 8 hours  hydrochlorothiazide 12.5 milliGRAM(s) Oral daily  insulin lispro (ADMELOG) corrective regimen sliding scale   SubCutaneous every 6 hours  petrolatum Ophthalmic Ointment 1 Application(s) Both EYES two times a day  sevelamer carbonate 800 milliGRAM(s) Oral every 12 hours  sodium zirconium cyclosilicate 5 Gram(s) Oral daily      LABS:  Na: 139 (11-24 @ 05:30), 139 (11-23 @ 05:30), 138 (11-22 @ 05:30)  K: 3.7 (11-24 @ 05:30), 4.6 (11-23 @ 05:30), 4.4 (11-22 @ 05:30)  Cl: 106 (11-24 @ 05:30), 103 (11-23 @ 05:30), 104 (11-22 @ 05:30)  CO2: 22 (11-24 @ 05:30), 19 (11-23 @ 05:30), 20 (11-22 @ 05:30)  BUN: 79 (11-24 @ 05:30), 79 (11-23 @ 05:30), 76 (11-22 @ 05:30)  Cr: 1.88 (11-24 @ 05:30), 2.15 (11-23 @ 05:30), 2.33 (11-22 @ 05:30)  Glu: 147(11-24 @ 05:30), 124(11-23 @ 05:30), 132(11-22 @ 05:30)    Hgb: 10.9 (11-24 @ 05:30), 12.1 (11-23 @ 05:30), 11.2 (11-22 @ 05:30)  Hct: 35.3 (11-24 @ 05:30), 37.4 (11-23 @ 05:30), 35.6 (11-22 @ 05:30)  WBC: 9.68 (11-24 @ 05:30), 8.38 (11-23 @ 05:30), 6.53 (11-22 @ 05:30)  Plt: 246 (11-24 @ 05:30), 268 (11-23 @ 05:30), 226 (11-22 @ 05:30)    INR:   PTT:             ABG - ( 22 Nov 2024 14:36 )  pH, Arterial: 7.40  pH, Blood: x     /  pCO2: 36    /  pO2: 108   / HCO3: 22    / Base Excess: -2.0  /  SaO2: 99.3

## 2024-11-25 LAB
ANION GAP SERPL CALC-SCNC: 13 MMOL/L — SIGNIFICANT CHANGE UP (ref 5–17)
BUN SERPL-MCNC: 73 MG/DL — HIGH (ref 7–23)
CALCIUM SERPL-MCNC: 9.2 MG/DL — SIGNIFICANT CHANGE UP (ref 8.4–10.5)
CHLORIDE SERPL-SCNC: 103 MMOL/L — SIGNIFICANT CHANGE UP (ref 96–108)
CO2 SERPL-SCNC: 21 MMOL/L — LOW (ref 22–31)
CREAT SERPL-MCNC: 1.65 MG/DL — HIGH (ref 0.5–1.3)
EGFR: 52 ML/MIN/1.73M2 — LOW
EGFR: 52 ML/MIN/1.73M2 — LOW
GLUCOSE SERPL-MCNC: 135 MG/DL — HIGH (ref 70–99)
GRAM STN FLD: ABNORMAL
HCT VFR BLD CALC: 33.2 % — LOW (ref 39–50)
HGB BLD-MCNC: 10.3 G/DL — LOW (ref 13–17)
MAGNESIUM SERPL-MCNC: 2.4 MG/DL — SIGNIFICANT CHANGE UP (ref 1.6–2.6)
MCHC RBC-ENTMCNC: 28.9 PG — SIGNIFICANT CHANGE UP (ref 27–34)
MCHC RBC-ENTMCNC: 31 G/DL — LOW (ref 32–36)
MCV RBC AUTO: 93.3 FL — SIGNIFICANT CHANGE UP (ref 80–100)
NRBC # BLD: 0 /100 WBCS — SIGNIFICANT CHANGE UP (ref 0–0)
NRBC BLD-RTO: 0 /100 WBCS — SIGNIFICANT CHANGE UP (ref 0–0)
PHOSPHATE SERPL-MCNC: 3.8 MG/DL — SIGNIFICANT CHANGE UP (ref 2.5–4.5)
PLATELET # BLD AUTO: 234 K/UL — SIGNIFICANT CHANGE UP (ref 150–400)
POTASSIUM SERPL-MCNC: 3.6 MMOL/L — SIGNIFICANT CHANGE UP (ref 3.5–5.3)
POTASSIUM SERPL-SCNC: 3.6 MMOL/L — SIGNIFICANT CHANGE UP (ref 3.5–5.3)
RBC # BLD: 3.56 M/UL — LOW (ref 4.2–5.8)
RBC # FLD: 14.6 % — HIGH (ref 10.3–14.5)
SODIUM SERPL-SCNC: 137 MMOL/L — SIGNIFICANT CHANGE UP (ref 135–145)
SPECIMEN SOURCE: SIGNIFICANT CHANGE UP
WBC # BLD: 6.91 K/UL — SIGNIFICANT CHANGE UP (ref 3.8–10.5)
WBC # FLD AUTO: 6.91 K/UL — SIGNIFICANT CHANGE UP (ref 3.8–10.5)

## 2024-11-25 PROCEDURE — 99291 CRITICAL CARE FIRST HOUR: CPT

## 2024-11-25 RX ORDER — SENNA 187 MG
2 TABLET ORAL AT BEDTIME
Refills: 0 | Status: DISCONTINUED | OUTPATIENT
Start: 2024-11-25 | End: 2024-12-12

## 2024-11-25 RX ORDER — ACETAMINOPHEN 500 MG/5ML
650 LIQUID (ML) ORAL EVERY 6 HOURS
Refills: 0 | Status: DISCONTINUED | OUTPATIENT
Start: 2024-11-25 | End: 2025-01-20

## 2024-11-25 RX ADMIN — Medication 2 TABLET(S): at 22:08

## 2024-11-25 RX ADMIN — AMLODIPINE BESYLATE 10 MILLIGRAM(S): 10 TABLET ORAL at 06:04

## 2024-11-25 RX ADMIN — ACETYLCYSTEINE 4 MILLILITER(S): 200 INHALANT RESPIRATORY (INHALATION) at 22:45

## 2024-11-25 RX ADMIN — HEPARIN SODIUM 5000 UNIT(S): 1000 INJECTION INTRAVENOUS; SUBCUTANEOUS at 06:04

## 2024-11-25 RX ADMIN — Medication 1 DROP(S): at 13:49

## 2024-11-25 RX ADMIN — Medication 667 MILLIGRAM(S): at 17:42

## 2024-11-25 RX ADMIN — LISINOPRIL 5 MILLIGRAM(S): 5 TABLET ORAL at 06:04

## 2024-11-25 RX ADMIN — HEPARIN SODIUM 5000 UNIT(S): 1000 INJECTION INTRAVENOUS; SUBCUTANEOUS at 13:49

## 2024-11-25 RX ADMIN — HEPARIN SODIUM 5000 UNIT(S): 1000 INJECTION INTRAVENOUS; SUBCUTANEOUS at 22:08

## 2024-11-25 RX ADMIN — SEVELAMER HYDROCHLORIDE 800 MILLIGRAM(S): 800 TABLET ORAL at 06:05

## 2024-11-25 RX ADMIN — Medication 40 MILLIEQUIVALENT(S): at 06:05

## 2024-11-25 RX ADMIN — Medication 1 DROP(S): at 10:36

## 2024-11-25 RX ADMIN — ACETYLCYSTEINE 4 MILLILITER(S): 200 INHALANT RESPIRATORY (INHALATION) at 05:48

## 2024-11-25 RX ADMIN — Medication 1 DROP(S): at 17:43

## 2024-11-25 RX ADMIN — Medication 2000 MILLILITER(S): at 16:00

## 2024-11-25 RX ADMIN — Medication 15 MILLILITER(S): at 06:05

## 2024-11-25 RX ADMIN — IPRATROPIUM BROMIDE AND ALBUTEROL SULFATE 3 MILLILITER(S): .5; 2.5 SOLUTION RESPIRATORY (INHALATION) at 13:01

## 2024-11-25 RX ADMIN — IPRATROPIUM BROMIDE AND ALBUTEROL SULFATE 3 MILLILITER(S): .5; 2.5 SOLUTION RESPIRATORY (INHALATION) at 22:45

## 2024-11-25 RX ADMIN — Medication 1 APPLICATION(S): at 06:06

## 2024-11-25 RX ADMIN — IPRATROPIUM BROMIDE AND ALBUTEROL SULFATE 3 MILLILITER(S): .5; 2.5 SOLUTION RESPIRATORY (INHALATION) at 05:48

## 2024-11-25 RX ADMIN — DOXAZOSIN MESYLATE 8 MILLIGRAM(S): 8 TABLET ORAL at 22:08

## 2024-11-25 RX ADMIN — Medication 1 APPLICATION(S): at 17:42

## 2024-11-25 RX ADMIN — Medication 1 APPLICATION(S): at 06:04

## 2024-11-25 RX ADMIN — Medication 1 DROP(S): at 02:54

## 2024-11-25 RX ADMIN — ACETYLCYSTEINE 4 MILLILITER(S): 200 INHALANT RESPIRATORY (INHALATION) at 13:02

## 2024-11-25 RX ADMIN — Medication 1 DROP(S): at 06:04

## 2024-11-25 RX ADMIN — Medication 15 MILLILITER(S): at 17:42

## 2024-11-25 RX ADMIN — Medication 667 MILLIGRAM(S): at 06:04

## 2024-11-25 RX ADMIN — Medication 1 DROP(S): at 22:08

## 2024-11-25 NOTE — PROGRESS NOTE ADULT - ASSESSMENT
44m 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; respiratory failure s/p trach/peg readmit for hypoxia 2/2 presumed mucous plugging, kept in ICU for episode of hypertension; recovered w/ IVF bolus.     -NCq4, VSq1  -monitor blood pressure given hypotension episode   -monitor on trach collar, saturation well 97% sop2; c/w duoneb & mucomyst q8   -ANIKA on CKD improving   -monitor for fever; low threshold to start abx

## 2024-11-25 NOTE — PROGRESS NOTE ADULT - SUBJECTIVE AND OBJECTIVE BOX
INTERVAL HISTORY: HPI:  Unknown age male, unknown past medical history (reported stroke and MI by coworkers) presented to [facility] with AMS, Pt was working at [employer] when he was found down by coworkers. EMS called and pt brought to [facility] ED. Intubated, sedated, started on cardene for SBPs in 200s. CT head showed brain stem bleed. Transferred to NSICU for further management.  (30 Sep 2024 12:55)      MEDICATIONS  (STANDING):  acetylcysteine 10%  Inhalation 4 milliLiter(s) Inhalation every 8 hours  albuterol/ipratropium for Nebulization 3 milliLiter(s) Nebulizer every 8 hours  artificial tears (preservative free) Ophthalmic Solution 1 Drop(s) Right EYE every 4 hours  calcium acetate 667 milliGRAM(s) Oral every 12 hours  chlorhexidine 0.12% Liquid 15 milliLiter(s) Oral Mucosa every 12 hours  chlorhexidine 2% Cloths 1 Application(s) Topical <User Schedule>  doxazosin 8 milliGRAM(s) Oral at bedtime  heparin   Injectable 5000 Unit(s) SubCutaneous every 8 hours  petrolatum Ophthalmic Ointment 1 Application(s) Both EYES two times a day  senna 2 Tablet(s) Oral at bedtime    MEDICATIONS  (PRN):  acetaminophen   Oral Liquid .. 650 milliGRAM(s) Oral every 6 hours PRN Temp greater or equal to 38.5C (101.3F), Mild Pain (1 - 3)      Drug Dosing Weight  Height (cm): 172.7 (30 Sep 2024 08:39)  Weight (kg): 80 (30 Sep 2024 09:04)  BMI (kg/m2): 26.8 (30 Sep 2024 09:04)  BSA (m2): 1.94 (30 Sep 2024 09:04)    PAST MEDICAL & SURGICAL HISTORY:  Acute myocardial infarction  CVA (cerebral vascular accident)    REVIEW OF SYSTEMS: [ ] Unable to Assess due to neurologic exam   [ ] All ROS addressed below are non-contributory, except:  Neuro: [ ] Headache [ ] Back pain [ ] Numbness [ ] Weakness [ ] Ataxia [ ] Dizziness [ ] Aphasia [ ] Dysarthria [ ] Visual disturbance  Resp: [ ] Shortness of breath/dyspnea, [ ] Orthopnea [ ] Cough  CV: [ ] Chest pain [ ] Palpitation [ ] Lightheadedness [ ] Syncope  Renal: [ ] Thirst [ ] Edema  GI: [ ] Nausea [ ] Emesis [ ] Abdominal pain [ ] Constipation [ ] Diarrhea  Hem: [ ] Hematemesis [ ] bright red blood per rectum  ID: [ ] Fever [ ] Chills [ ] Dysuria  ENT: [ ] Rhinorrhea    PHYSICAL EXAM:    General: No Acute Distress   Neurological: eyes open spontaneously, RANDELL, attempts to track w/ eyes, RUE wiggles fingers, RLE wiggles toes, LLE WD, LUE TF  Pulmonary: trachcollar in place no overt bleeding  Cardiovascular: S1, S2, Regular Rate and Rhythm   Gastrointestinal: Soft, Nontender, Nondistended   Extremities: No calf tenderness     ICU Vital Signs Last 24 Hrs  T(C): 37.4 (2024 22:36), Max: 37.4 (2024 22:36)  T(F): 99.4 (2024 22:36), Max: 99.4 (2024 22:36)  HR: 77 (2024 22:00) (65 - 94)  BP: 109/76 (2024 22:00) (71/44 - 127/82)  BP(mean): 89 (2024 22:00) (53 - 99)  RR: 20 (2024 22:00) (15 - 20)  SpO2: 98% (2024 22:00) (95% - 100%)    O2 Parameters below as of 2024 22:00  Patient On (Oxygen Delivery Method): tracheostomy collar  O2 Flow (L/min): 10  O2 Concentration (%): 40        I&O's Detail    2024 07:01  -  2024 07:00  --------------------------------------------------------  IN:    Enteral Tube Flush: 370 mL    Free Water: 750 mL    Miscellaneous Tube Feedin mL  Total IN: 2080 mL    OUT:    Indwelling Catheter - Urethral (mL): 1925 mL  Total OUT: 1925 mL    Total NET: 155 mL      2024 07:01  -  2024 22:56  --------------------------------------------------------  IN:    Free Water: 500 mL    Miscellaneous Tube Feedin mL    Sodium Chloride 0.9% Bolus: 1000 mL  Total IN: 2340 mL    OUT:    Indwelling Catheter - Urethral (mL): 275 mL    Intermittent Catheterization - Urethral (mL): 350 mL  Total OUT: 625 mL    Total NET: 1715 mL        Mode: standby        LABS:  CBC Full  -  ( 2024 04:39 )  WBC Count : 6.91 K/uL  RBC Count : 3.56 M/uL  Hemoglobin : 10.3 g/dL  Hematocrit : 33.2 %  Platelet Count - Automated : 234 K/uL  Mean Cell Volume : 93.3 fl  Mean Cell Hemoglobin : 28.9 pg  Mean Cell Hemoglobin Concentration : 31.0 g/dL  Auto Neutrophil # : x  Auto Lymphocyte # : x  Auto Monocyte # : x  Auto Eosinophil # : x  Auto Basophil # : x  Auto Neutrophil % : x  Auto Lymphocyte % : x  Auto Monocyte % : x  Auto Eosinophil % : x  Auto Basophil % : x        137  |  103  |  73[H]  ----------------------------<  135[H]  3.6   |  21[L]  |  1.65[H]    Ca    9.2      2024 04:39  Phos  3.8     -  Mg     2.4     -    TPro  7.4  /  Alb  3.7  /  TBili  0.4  /  DBili  x   /  AST  19  /  ALT  33  /  AlkPhos  136[H]  11-24      Urinalysis Basic - ( 2024 04:39 )    Color: x / Appearance: x / SG: x / pH: x  Gluc: 135 mg/dL / Ketone: x  / Bili: x / Urobili: x   Blood: x / Protein: x / Nitrite: x   Leuk Esterase: x / RBC: x / WBC x   Sq Epi: x / Non Sq Epi: x / Bacteria: x        RADIOLOGY & ADDITIONAL STUDIES:

## 2024-11-25 NOTE — PROGRESS NOTE ADULT - ASSESSMENT
44y/M with  large pontine hemorrhage, SAH, brain edema; ?locked-in  CVA  h/o MI  acute on chronic CKD, hydronephrosis    PLAN:   NEURO: neurochecks q4, VS4 PRN pain meds with oxycodone   neurostim: off   palliative / ethics follow-up  off VEEG - neg  REHAB:  physical therapy evaluation and management    EARLY MOB:  bedrest HOB up    PULM:  trach collar 35%; pulmo toilette, nebs, d/c atropine SL   CARDIO:  SBP goal 100-160mm Hg,  cont amlodipine, EF 75%  ENDO:  Blood sugar goals 140-180 mg/dL, A1C 5.4, ISS  GI:  off PPI s/p PEG, check FOBT (inc BUN), hold senna  DIET: TF nepro  RENAL:  stable creatinine; Na goal 135-145, Vuong, doxazosin 8;  continue phosphate binder sodium zirconium; TOV on Mon;  q6h  HEM/ONC:  Hb stable  VTE Prophylaxis: SCDs, SQH  ID: afebrile, no leukocytosis; off Abx  Social: will update family, palliative discussions with family  MISC: ofloxacin and erythromycin to R eye    Active issues:  What's keeping patient in the ICU? nothing   What is this patient's dispo plan? stepdown       ATTENDING ATTESTATION:  Patient not at high risk for neurologic deterioration / death.  Time spent on this noncritically ill patient: 55 minutes spent on total encounter, more than 50% of the visit was spent counseling and/or coordinating care by the attending physician.      Plan discussed with RN, house staff.    REVIEW OF SYSTEMS:  No headaches, no nausea or vomiting; 14 -point review of systems otherwise unremarkable.      ICU stepdown Checklist:    Completed: 10-27 @ 09:26    [X] hemodynamically stable – VS WNL and stable x 24hours, UO adequate  [n/a ] if  previously on HDA - off pressors x 24h with stable neuro exam    [X] no new symptoms x 24h (i.e. new fever, new-onset nausea/vomiting)  [X] stable labs: (i.e. WBC not rising, sodium not dropping)  [X] patient not at high risk for aspiration, if high risk then:                  [ ] should have definitive plans for trach/PEG (alternative option is to discharge from ICU to facilty)                  [ ] stepdown to bed close to nurse’s station  [n/a] low suctioning requirements (i.e. q4h or less)  [X] sign-off from primary RN* Stacy  [X] drains do not require ICU level of care  [X] if patient previously agitated or with behavioral issues – controlled   [X] pain controlled   44y/M with  large pontine hemorrhage, SAH, brain edema; ?locked-in  CVA  h/o MI  acute on chronic CKD, hydronephrosis    PLAN:   NEURO: neurochecks VS q4 PRN pain meds   neurostim: off   palliative / ethics follow-up  off VEEG - neg  REHAB:  physical therapy evaluation and management    EARLY MOB:  bedrest HOB up    PULM:  trach collar 40%; pulmo toilette, CPAP 5/5 overnight, standing ipratropium / albuterol / mucomyst; chest PT, HOB up  CARDIO:  SBP goal 100-160mm Hg,  cont amlodipine, EF 75%; d/c lisinopril  ENDO:  Blood sugar goals 140-180 mg/dL, A1C 5.4, ISS  GI:  bowel regimen  DIET: TF   RENAL:  IVL, Na goal 135-145, TOV cont doxazosin 8  HEM/ONC:  Hb stable  VTE Prophylaxis: SCDs, SQH  ID: afebrile, no leukocytosis  Social: will update family    Active issues:  What's keeping patient in the ICU? nothing   What is this patient's dispo plan? stepdown       ATTENDING ATTESTATION:  Patient not at high risk for neurologic deterioration / death.  Time spent on this noncritically ill patient: 55 minutes spent on total encounter, more than 50% of the visit was spent counseling and/or coordinating care by the attending physician.      Plan discussed with RN, house staff.    REVIEW OF SYSTEMS:  No headaches, no nausea or vomiting; 14 -point review of systems otherwise unremarkable.    ICU stepdown Checklist:    Completed: 11/25  @ 9:58    [X] hemodynamically stable – VS WNL and stable x 24hours, UO adequate  [n/a ] if  previously on HDA - off pressors x 24h with stable neuro exam    [X] no new symptoms x 24h (i.e. new fever, new-onset nausea/vomiting)  [X] stable labs: (i.e. WBC not rising, sodium not dropping)  [] patient not at high risk for aspiration, if high risk then:                  [ ] should have definitive plans for trach/PEG (alternative option is to discharge from ICU to facilty)                  [X] stepdown to bed close to nurse’s station  [X] low suctioning requirements (i.e. q4h or less)  [X] sign-off from primary RN* RN Coma   [X] drains do not require ICU level of care  [X] if patient previously agitated or with behavioral issues – controlled   [X] pain controlled   44y/M with  large pontine hemorrhage, SAH, brain edema; ?locked-in  CVA  h/o MI  acute on chronic CKD, hydronephrosis    PLAN:   NEURO: neurochecks VS q4 PRN pain meds   neurostim: off   palliative / ethics follow-up  off VEEG - neg  REHAB:  physical therapy evaluation and management    EARLY MOB:  bedrest HOB up    PULM:  trach collar 40%; pulmo toilette, CPAP 5/5 overnight, standing ipratropium / albuterol / mucomyst; chest PT, HOB up  CARDIO:  SBP goal 100-160mm Hg,  cont amlodipine, EF 75%; d/c lisinopril  ENDO:  Blood sugar goals 140-180 mg/dL, A1C 5.4, ISS  GI:  bowel regimen  DIET: TF   RENAL:  IVL, Na goal 135-145, TOV cont doxazosin 8  HEM/ONC:  Hb stable  VTE Prophylaxis: SCDs, SQH  ID: afebrile, no leukocytosis  Social: will update family    Active issues:  What's keeping patient in the ICU? shock  What is this patient's dispo plan? stepdown once stable    Addendum:  Episode of hypotension to SBP 66mm Hg, 45 mins on sitting up on chair - reverse trendelenburg, back to bed, given 1 L fluid bolus.  hold stepdown.  continue monitoring. VSq1h,     ATTENDING ATTESTATION:  Patient at high risk for neurological deterioration or death due to:  ICU delirium, aspiration PNA, DVT / PE.  Critical care time:  I have personally provided 60 minutes of critical care time, excluding time spent on separate procedures.      Plan discussed with RN, house staff.

## 2024-11-25 NOTE — PROGRESS NOTE ADULT - SUBJECTIVE AND OBJECTIVE BOX
=================================  NEUROCRITICAL CARE ATTENDING NOTE  =================================    GARETT DELEON   MRN-[id number]  Summary:  44y/M  unknown past medical history (reported stroke and MI by coworkers) presented to [facility] with AMS, Pt was working at [employer] when he was found down by coworkers. EMS called and pt brought to [facility] ED. Intubated, sedated, started on cardene for SBPs in 200s. CT head showed brain stem bleed. Transferred to NSICU for further management.  (30 Sep 2024 12:55)    COURSE IN THE HOSPITAL:  Date	POD	BD	Events	Surgery  	-	-	Transferred from [facility]. A-line placed. Versed discontinued. Hydralazine, 3% saline started. Changed propofol to precedex.	-  10/1	-	-	Stability CTH done. Added labetalol, started TF. Palliative and ethics consulted. Febrile 103°F, pan cultures sent.	-  10/2	-		Desaturation to 80s, FiO2 increased. Fentanyl given, ABG, CXR ordered. Propofol started. Vanc/CTX started. MRSA negative, vanc discontinued. Na 159, starting FW 250q6.	-  10/3	-	3	Na increasing, FW increased to 300q6. Bowel regimen discontinued. vEEG started.	-  10/4	-		Albumin bolus, increased LR. 7% hypersal started for 48hrs. Nephrology consulted for CKD. SBP 170s, hydralazine given.	-  10/5	-		Hydralazine and labetalol for SBP. RT placed for diarrhea.	-  10/6	-		FW increased to 350q4. IV Tylenol for temp 100.6°F.	-  10/7	-		Pancultured for temp 101.8°F.	-  10/8	-		Cr increase, LR decreased. Simethicone added. Increased hydralazine, labetalol. Na 145, FW 250q6. Retaining fluid, bladder scans ordered.	-  10/9	-		Abd x-ray for distention. Changed tube feeds to Jevity 1.5. Tolerating CPAP.	-  10/10	-	10	Vuong for urinary retention. Increased TF to goal. Family leaning toward trach/PEG.	-  10/11	-		Trach/PEG consults placed.	-  10/12	-		MRI brain complete.	-  10/13	-	13	Increased flomax. Hold SQH for trach.	-  10/14	-		S/p trach with pulmonary. NGT placed, CXR confirmed position.	-  10/15	-	15	Resumed feeds. Spiked fever 101°F, pan cultures sent.	-  10/16	-	16	Lokelma for K+ 5.4. Started vanc/zosyn for PNA. PEG held for fever.	-  10/17	-	17	Serum and urine osm ordered. Started sinemet, FW 100q4, IVF discontinued. MRSA negative, vanc discontinued. NGT replaced.	-  10/18	-	18	Amantadine added. Changed zosyn to unasyn for acinetobacter baumannii. Failed TOV, required SC.	-  10/19	-	19	Cardura added for retention. Decreased labetalol, hydralazine. Vuong replaced.	-  10/20	-	20	NGT dislodged, replaced. PEG scheduled for tomorrow. Lokelma for hyperkalemia.  10/21	-	21	s/p PEG  10/22	-	22	No significant events overnight. 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  trach collar 40%  10/24   -	24	No significant events overnight.   10/25   -	25	No significant events overnight.   10/26   -	26	Anisocoria overnight, given 23.4% x1, CT stable; briefly back on vent, ABG unremarkable, now back on trach collar; exam - following commands!  10/27   -	27	No significant events overnight.     Date	POD	BD	Events	  10/19		19	Cardura 2mg added for retention. Labetalol decreased. Vuong replaced.	  10/20		20	NGT dislodged, replaced. PEG placement scheduled for tomorrow. Free water increased. Labetalol decreased. Lokelma given for hyperkalemia.	  10/21	0	21	PEG placement with Gen Surg. Labetalol decreased. Cardura increased. Started Lokelma and Phoslo. Vuong discontinued.	PEG placement with Gen Surg  10/22		22	Plan to start tube feeding via PEG. Labetalol discontinued. CPAP 5/ for Cheyne-Vaughn respiration.	  10/23		23	Hydralazine discontinued. Trach collar trial. Rectal tube placed.	  10/24		24	Lokelma held for diarrhea. Cardura increased. Jevity switched to Nepro. Vuogn placed for high urine output.	  10/25		25	Change in neuro status: anisocoria. Emergent CTH: resolved pontine hemorrhage, continued edema.	  10/26		26	Lokelma and free water resumed. 23.4% NaCl given. Emergent CTH: resolved pontine hemorrhage, continued edema.	  10/27		27	Neuro stable.	  10/28		28	Neuro stable. Miralax ordered. Vuong removed, pending TOV.	  10/29		29	2L NS over 8 hrs for increased BUN/Cr ratio. Vuong placed.	  10/31		31	Na 149, increased free water to 200q6. 1L NS for uptrending BUN.	  		32	1L NS for dehydration. Na 146, increased FW to 250q6.	  		33	Neuro stable. 500cc bolus for tachycardia.	  11/3		34	Neuro stable. Febrile to 101.9°F, pan cultured. 500cc NS bolus for sinus tachycardia.	  		35	Neuro stable. 1L NS for tachycardia. Sputum (+) for Stenotrophomonas.	  		36	Neuro stable. Vancomycin discontinued. Chest PT BID. ID consulted. Continue Zosyn.	  		37	Blood cx (+) for Klebsiella. Zosyn changed to Cefepime. CTAP ordered.	  		38	Maintenance fluids started. Pending CT A/P.	  		39	CT CAP negative for infection.	  11/10		41	Desat to 85 on trach collar, O2 increased. Sat 94%. Fever, pancultured. AM ABG pO2 79.	  		42	Tachycardia 130s, desat to 90 on 10L. Cough, temp 101.4, given 1g IV Tylenol and 500cc NS. Fever and HR downtrending.	  		43	Fever and HR downtrending, satting 97% on 70% FIO2.	  		44	Standing Tylenol for tachycardia. Desat to 80s, O2 increased. Pending CTA PE protocol.	  		45	Neuro stable. Resp therapy decreased FiO2 to 70%.	  11/15		46	Rapid called for desat 30s, tachycardia 140s. Upgraded to ICU. Vent started. POCUS and CXR unremarkable. ABG improved.	  		47	POCUS showed collapsible IVC, given 1L bolus. Vanco/Cefepime started for PNA. MRSA swab negative, Vanco discontinued.	  		48	1L bolus for tachycardia. Fever 101, cultured. 1.5L bolus, 5 IV Lopressor. Stenotrophomonas on sputum cx.	  		49	Consulted ID, Cefepime switched to Bactrim x7 days. Started Hydrochlorothiazine 12.5mg daily.	  		50	Tachycardia 120s, given Tylenol and 500cc NS. Tolerating 5/5, switched to TCx. Bactrim discontinued. Vuong discontinued.	  		51	Satting low 90s, mildly tachycardic to 110s. O2 improved on TCx. CPAP 5/5 placed back on.	  		52	Tolerating CPAP 5/. HCTZ held for Cr bump, straight cath increased.	  		53	Resumed Phoslo. Vuong placed. Resumed HCTZ.	  		54	Holding Tylenol for possible fever, pan culture if febrile. Cr improved. Cont. CPAP.	  		55	Neuro stable. HCTZ dc'd, started Lisinopril 5. Lokelma dc'd for K 3.7.	  		56	Neuro stable.	      Past Medical History: Acute myocardial infarction CVA (cerebral vascular accident)  Allergies:  Allergy Status Unknown  Home meds:     PHYSICAL EXAMINATION  T(C): 36.4 (24 @ 05:19), Max: 37.2 (24 @ 15:37) HR: 72 (24 @ 07:00) (70 - 112) BP: 127/82 (24 @ 07:00) (100/66 - 127/82)  RR: 17 (24 @ 07:00) (15 - 18) SpO2: 100% (24 @ 07:00) (94% - 100%)  NEUROLOGIC EXAMINATION:  Patient is B eye opens spontaneously, R 6 sluggish L 4 brisk; FC (2 fingers, wiggles toes), tracking with eyes up and down, R UE localizes, B LE withdraws, L UE trace movements, not mimicking; reflexive hand   GENERAL: trach collar 35%  EENT:  anicteric, R eye erythema   CARDIOVASCULAR: (+) S1 S2, normal rate and regular rhythm  PULMONARY: clear to auscultation bilaterally (q4h suctioning oral secretions, coughing sputum from trach)  ABDOMEN: soft, nontender with normoactive bowel sounds  EXTREMITIES: no edema  SKIN: no rash    LABS:   CAPILLARY BLOOD GLUCOSE 134 97 132 99 104              (9.68)  10.3 (10.9)  6.91  )-----------( 234      ( 2024 04:39 )             33.2     137  |  103  |  73[H]  ----------------------------<  135[H]  3.6   |  21[L]  |  1.65[H] (1.72, 1.88, 2.15))  Ca    9.2      2024 04:39  Phos  3.8       Mg     2.4         TPro  7.4  /  Alb  3.7  /  TBili  0.4  /  DBili  x   /  AST  19  /  ALT  33  /  AlkPhos  136[H]   @ 07:01  -   @ 07:00  --------------------------------------------------------  IN: 2080 mL / OUT: 1865 mL / NET: 215 mL    Bacteriology:  10/16 sputum culture acinetobacter  10/15 Blood CS NG5D    CSF studies:  EEG:  Neuroimagin2024 9:30 AM	CT Brain	Large pontine hemorrhage, SAH, edema, no hydrocephalus. Multiple old infarcts.  2024 1:11 PM	CTA Head/Neck	Pontine hemorrhage stable, no malformation, stable ventricular size, no stenosis or aneurysm.  2024 6:40 PM	CT Brain	Enlarged pontine hemorrhage, SAH in frontal lobes.  2024        	CT Brain	Stable pontine hemorrhage, SAH stable.  10/03/2024 11:06 AM	CT Brain	Stable hematoma, no rebleeding.  10/12/2024 9:16 AM	MRI Brain	Stable hemorrhage, no new bleeding, cerebellar infarct.    Other imagin2024 7:20PM	CTA CHEST	No PE to lobar arteries; aspiration in RLL with groundglass opacity.  2024 7:50PM	US DPLX LE	No DVT in either lower extremity.  2024 6:47AM	CT ABD/PELVIS	Atelectasis; can't exclude pneumonia; no abdominal/pelvic infection.  10/25/2024 12:56AM	CT BRAIN	Evolving pontine hemorrhage, no rebleeding, worsened R mastoid opacification.  10/19/2024 1:13PM	US DPLX LE	No DVT in either lower extremity.  10/01/2024 11:49 AM	US Retroperit	No hydronephrosis; chronic renal disease; Vuong catheter.  10/01/2024 11:14 AM	XR Abdomen	Orogastric tube coiled in stomach; nonspecific bowel gas pattern.  10/07/2024 10:25 PM	XR Abdomen	Nonspecific bowel gas pattern.  10/08/2024 8:01 AM	US Retroperit	Normal kidneys; distended bladder; mild hydronephrosis.  10/10/2024 7:32 AM	XR Abdomen	Nasogastric tube; nonspecific bowel gas pattern.  10/19/2024 1:13 PM	US DPLX Veins	No DVT in lower extremities.    MEDICATIONS:     ·	heparin   Injectable 5000 SubCutaneous every 8 hours  ·	amLODIPine   Tablet 10 milliGRAM(s) Oral daily  ·	doxazosin 8 milliGRAM(s) Oral at bedtime  ·	lisinopril 5 milliGRAM(s) Oral daily  ·	acetylcysteine 10%  Inhalation 4 Inhalation every 8 hours  ·	albuterol/ipratropium for Nebulization 3 Nebulizer every 8 hours  ·	artificial tears (preservative free) Ophthalmic Solution 1 Right EYE every 4 hours  ·	calcium acetate 667 Oral every 12 hours  ·	petrolatum Ophthalmic Ointment 1 Both EYES two times a day      MEDICATIONS: 10-27    ·	heparin   Injectable 5000 SubCutaneous every 8 hours  ·	amLODIPine   Tablet 10 milliGRAM(s) Oral daily  ·	doxazosin 8 milliGRAM(s) Oral at bedtime  ·	acetylcysteine 10%  Inhalation 4 Inhalation every 12 hours  ·	albuterol/ipratropium for Nebulization 3 Nebulizer every 12 hours  ·	senna 2 Oral at bedtime  ·	artificial tears (preservative free) Ophthalmic Solution 1 Right EYE every 2 hours  ·	atropine 1% Ophthalmic Solution for SubLingual Use 2 SubLingual every 12 hours  ·	calcium acetate 667 Oral three times a day with meals  ·	erythromycin   Ointment 1 Right EYE every 6 hours  ·	ofloxacin 0.3% Solution 1 Right EYE four times a day  ·	petrolatum Ophthalmic Ointment 1 Both EYES two times a day  ·	sodium zirconium cyclosilicate 5 Oral daily  ·	acetaminophen   Oral Liquid .. 650 Oral every 6 hours PRN  ·	oxyCODONE    Solution 5 Oral every 4 hours PRN    IV FLUIDS: IVL  DRIPS:  DIET: Nepro @ 45cc/hr  Lines:   Drains:   Vuong (inserted 10/24 - straight cath 8x/day with >700cc urine output)  Wounds:    CODE STATUS:  Full Code                       GOALS OF CARE:  aggressive                      DISPOSITION:  ICU  ICH Score 3 =================================  NEUROCRITICAL CARE ATTENDING NOTE  =================================    GARETT DELEON   MRN-[id number]  Summary:  44y/M  unknown past medical history (reported stroke and MI by coworkers) presented to [facility] with AMS, Pt was working at [employer] when he was found down by coworkers. EMS called and pt brought to [facility] ED. Intubated, sedated, started on cardene for SBPs in 200s. CT head showed brain stem bleed. Transferred to NSICU for further management.  (30 Sep 2024 12:55)    COURSE IN THE HOSPITAL:  Date	POD	BD	Events	Surgery  	-	-	Transferred from [facility]. A-line placed. Versed discontinued. Hydralazine, 3% saline started. Changed propofol to precedex.	-  10/1	-	-	Stability CTH done. Added labetalol, started TF. Palliative and ethics consulted. Febrile 103°F, pan cultures sent.	-  10/2	-		Desaturation to 80s, FiO2 increased. Fentanyl given, ABG, CXR ordered. Propofol started. Vanc/CTX started. MRSA negative, vanc discontinued. Na 159, starting FW 250q6.	-  10/3	-	3	Na increasing, FW increased to 300q6. Bowel regimen discontinued. vEEG started.	-  10/4	-		Albumin bolus, increased LR. 7% hypersal started for 48hrs. Nephrology consulted for CKD. SBP 170s, hydralazine given.	-  10/5	-		Hydralazine and labetalol for SBP. RT placed for diarrhea.	-  10/6	-		FW increased to 350q4. IV Tylenol for temp 100.6°F.	-  10/7	-		Pancultured for temp 101.8°F.	-  10/8	-		Cr increase, LR decreased. Simethicone added. Increased hydralazine, labetalol. Na 145, FW 250q6. Retaining fluid, bladder scans ordered.	-  10/9	-		Abd x-ray for distention. Changed tube feeds to Jevity 1.5. Tolerating CPAP.	-  10/10	-	10	Vuong for urinary retention. Increased TF to goal. Family leaning toward trach/PEG.	-  10/11	-		Trach/PEG consults placed.	-  10/12	-		MRI brain complete.	-  10/13	-	13	Increased flomax. Hold SQH for trach.	-  10/14	-		S/p trach with pulmonary. NGT placed, CXR confirmed position.	-  10/15	-	15	Resumed feeds. Spiked fever 101°F, pan cultures sent.	-  10/16	-	16	Lokelma for K+ 5.4. Started vanc/zosyn for PNA. PEG held for fever.	-  10/17	-	17	Serum and urine osm ordered. Started sinemet, FW 100q4, IVF discontinued. MRSA negative, vanc discontinued. NGT replaced.	-  10/18	-	18	Amantadine added. Changed zosyn to unasyn for acinetobacter baumannii. Failed TOV, required SC.	-  10/19	-	19	Cardura added for retention. Decreased labetalol, hydralazine. Vuong replaced.	-  10/20	-	20	NGT dislodged, replaced. PEG scheduled for tomorrow. Lokelma for hyperkalemia.  10/21	-	21	s/p PEG  10/22	-	22	No significant events overnight. 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  trach collar 40%  10/24   -	24	No significant events overnight.   10/25   -	25	No significant events overnight.   10/26   -	26	Anisocoria overnight, given 23.4% x1, CT stable; briefly back on vent, ABG unremarkable, now back on trach collar; exam - following commands!  10/27   -	27	No significant events overnight.     Date	POD	BD	Events	  10/19		19	Cardura 2mg added for retention. Labetalol decreased. Vuong replaced.	  10/20		20	NGT dislodged, replaced. PEG placement scheduled for tomorrow. Free water increased. Labetalol decreased. Lokelma given for hyperkalemia.	  10/21	0	21	PEG placement with Gen Surg. Labetalol decreased. Cardura increased. Started Lokelma and Phoslo. Vuong discontinued.	PEG placement with Gen Surg  10/22		22	Plan to start tube feeding via PEG. Labetalol discontinued. CPAP 5/ for Cheyne-Vaughn respiration.	  10/23		23	Hydralazine discontinued. Trach collar trial. Rectal tube placed.	  10/24		24	Lokelma held for diarrhea. Cardura increased. Jevity switched to Nepro. Vuong placed for high urine output.	  10/25		25	Change in neuro status: anisocoria. Emergent CTH: resolved pontine hemorrhage, continued edema.	  10/26		26	Lokelma and free water resumed. 23.4% NaCl given. Emergent CTH: resolved pontine hemorrhage, continued edema.	  10/27		27	Neuro stable.	  10/28		28	Neuro stable. Miralax ordered. Vuong removed, pending TOV.	  10/29		29	2L NS over 8 hrs for increased BUN/Cr ratio. Vuong placed.	  10/31		31	Na 149, increased free water to 200q6. 1L NS for uptrending BUN.	  		32	1L NS for dehydration. Na 146, increased FW to 250q6.	  		33	Neuro stable. 500cc bolus for tachycardia.	  11/3		34	Neuro stable. Febrile to 101.9°F, pan cultured. 500cc NS bolus for sinus tachycardia.	  		35	Neuro stable. 1L NS for tachycardia. Sputum (+) for Stenotrophomonas.	  		36	Neuro stable. Vancomycin discontinued. Chest PT BID. ID consulted. Continue Zosyn.	  		37	Blood cx (+) for Klebsiella. Zosyn changed to Cefepime. CTAP ordered.	  		38	Maintenance fluids started. Pending CT A/P.	  		39	CT CAP negative for infection.	  11/10		41	Desat to 85 on trach collar, O2 increased. Sat 94%. Fever, pancultured. AM ABG pO2 79.	  		42	Tachycardia 130s, desat to 90 on 10L. Cough, temp 101.4, given 1g IV Tylenol and 500cc NS. Fever and HR downtrending.	  		43	Fever and HR downtrending, satting 97% on 70% FIO2.	  		44	Standing Tylenol for tachycardia. Desat to 80s, O2 increased. Pending CTA PE protocol.	  		45	Neuro stable. Resp therapy decreased FiO2 to 70%.	  11/15		46	Rapid called for desat 30s, tachycardia 140s. Upgraded to ICU. Vent started. POCUS and CXR unremarkable. ABG improved.	  		47	POCUS showed collapsible IVC, given 1L bolus. Vanco/Cefepime started for PNA. MRSA swab negative, Vanco discontinued.	  		48	1L bolus for tachycardia. Fever 101, cultured. 1.5L bolus, 5 IV Lopressor. Stenotrophomonas on sputum cx.	  		49	Consulted ID, Cefepime switched to Bactrim x7 days. Started Hydrochlorothiazine 12.5mg daily.	  		50	Tachycardia 120s, given Tylenol and 500cc NS. Tolerating 5/5, switched to TCx. Bactrim discontinued. Vuong discontinued.	  		51	Satting low 90s, mildly tachycardic to 110s. O2 improved on TCx. CPAP 5/5 placed back on.	  		52	Tolerating CPAP . HCTZ held for Cr bump, straight cath increased.	  		53	Resumed Phoslo. Vuong placed. Resumed HCTZ.	  		54	Holding Tylenol for possible fever, pan culture if febrile. Cr improved. Cont. CPAP.	  		55	Neuro stable. HCTZ dc'd, started Lisinopril 5. Lokelma dc'd for K 3.7.	  		56	Neuro stable.     Past Medical History: Acute myocardial infarction CVA (cerebral vascular accident)  Allergies:  Allergy Status Unknown  Home meds:     PHYSICAL EXAMINATION  T(C): 36.4 (24 @ 05:19), Max: 37.2 (24 @ 15:37) HR: 72 (24 @ 07:00) (70 - 112) BP: 127/82 (24 @ 07:00) (100/66 - 127/82)  RR: 17 (24 @ 07:00) (15 - 18) SpO2: 100% (24 @ 07:00) (94% - 100%)  NEUROLOGIC EXAMINATION:  Patient awake, alert, OE spontaneously, tracks, FC R UE (attempts to squeeze to command), RANDELL , RUE 1/5 L UE withdraws, R LE wiggles, withdraws, L LE withdraws   GENERAL: trach collar 40%  EENT:  anicteric, R eye erythema   CARDIOVASCULAR: (+) S1 S2, normal rate and regular rhythm  PULMONARY: clear to auscultation bilaterally (oral secretions q2-3)  ABDOMEN: soft, nontender with normoactive bowel sounds  EXTREMITIES: no edema  SKIN: no rash    LABS:   CAPILLARY BLOOD GLUCOSE 134 97 132 99 104              (9.68)  10.3 (10.9)  6.91  )-----------( 234      ( 2024 04:39 )             33.2     137  |  103  |  73[H]  ----------------------------<  135[H]  3.6   |  21[L]  |  1.65[H] (1.72, 1.88, 2.15)    Ca    9.2      2024 04:39  Phos  3.8       Mg     2.4         TPro  7.4  /  Alb  3.7  /  TBili  0.4  /  DBili  x   /  AST  19  /  ALT  33  /  AlkPhos  136[H]   @ 07:01  -   @ 07:00  --------------------------------------------------------  IN: 2080 mL / OUT: 1865 mL / NET: 215 mL    Bacteriology:  10/16 sputum culture acinetobacter  10/15 Blood CS NG5D    CSF studies:  EEG:  Neuroimagin2024 9:30 AM	CT Brain	Large pontine hemorrhage, SAH, edema, no hydrocephalus. Multiple old infarcts.  2024 1:11 PM	CTA Head/Neck	Pontine hemorrhage stable, no malformation, stable ventricular size, no stenosis or aneurysm.  2024 6:40 PM	CT Brain	Enlarged pontine hemorrhage, SAH in frontal lobes.  2024        	CT Brain	Stable pontine hemorrhage, SAH stable.  10/03/2024 11:06 AM	CT Brain	Stable hematoma, no rebleeding.  10/12/2024 9:16 AM	MRI Brain	Stable hemorrhage, no new bleeding, cerebellar infarct.    Other imagin2024 7:20PM	CTA CHEST	No PE to lobar arteries; aspiration in RLL with groundglass opacity.  2024 7:50PM	US DPLX LE	No DVT in either lower extremity.  2024 6:47AM	CT ABD/PELVIS	Atelectasis; can't exclude pneumonia; no abdominal/pelvic infection.  10/25/2024 12:56AM	CT BRAIN	Evolving pontine hemorrhage, no rebleeding, worsened R mastoid opacification.  10/19/2024 1:13PM	US DPLX LE	No DVT in either lower extremity.  10/01/2024 11:49 AM	US Retroperit	No hydronephrosis; chronic renal disease; Vuong catheter.  10/01/2024 11:14 AM	XR Abdomen	Orogastric tube coiled in stomach; nonspecific bowel gas pattern.  10/07/2024 10:25 PM	XR Abdomen	Nonspecific bowel gas pattern.  10/08/2024 8:01 AM	US Retroperit	Normal kidneys; distended bladder; mild hydronephrosis.  10/10/2024 7:32 AM	XR Abdomen	Nasogastric tube; nonspecific bowel gas pattern.  10/19/2024 1:13 PM	US DPLX Veins	No DVT in lower extremities.    MEDICATIONS:     ·	heparin   Injectable 5000 SubCutaneous every 8 hours  ·	amLODIPine   Tablet 10 milliGRAM(s) Oral daily  ·	doxazosin 8 milliGRAM(s) Oral at bedtime  ·	lisinopril 5 milliGRAM(s) Oral daily  ·	acetylcysteine 10%  Inhalation 4 Inhalation every 8 hours  ·	albuterol/ipratropium for Nebulization 3 Nebulizer every 8 hours  ·	artificial tears (preservative free) Ophthalmic Solution 1 Right EYE every 4 hours  ·	calcium acetate 667 Oral every 12 hours  ·	petrolatum Ophthalmic Ointment 1 Both EYES two times a day    IV FLUIDS: IVL  DRIPS:  DIET: [place] at goal  Lines:   Drains:   Vuong (reinserted )  Wounds:    CODE STATUS:  Full Code                       GOALS OF CARE:  aggressive                      DISPOSITION:  ICU  ICH Score 3

## 2024-11-25 NOTE — CONSULT NOTE ADULT - SUBJECTIVE AND OBJECTIVE BOX
Attending: Dr. Morales    Patient is a 44y old  Male who presents with a chief complaint of brain stem bleed (25 Nov 2024 00:00)      HPI:  Unknown age male, unknown past medical history (reported stroke and MI by coworkers) presented to [facility] with AMS, Pt was working at [employer] when he was found down by coworkers. EMS called and pt brought to [facility] ED. Intubated, sedated, started on cardene for SBPs in 200s. CT head showed brain stem bleed. Transferred to NSICU for further management.  (30 Sep 2024 12:55)    Podiatry:  Podiatry consulted for nailcare, Unable to obtain history as patient not Alert nor Oriented.       Review of systems negative except per HPI and as stated below  General:	 no weakness; no fevers, no chills  Skin/Breast: no rash  Respiratory and Thorax: no SOB, no cough  Cardiovascular:	No chest pain  Gastrointestinal:	 no nausea, vomiting , diarrhea  Genitourinary:	no dysuria, no difficulty urinating, no hematuria  Musculoskeletal:	no weakness, no joint swelling/pain  Neurological:	no focal weakness/numbness  Endocrine:	no polyuria, no polydipsia    PAST MEDICAL & SURGICAL HISTORY:  Acute myocardial infarction      CVA (cerebral vascular accident)        Home Medications:    Allergies    Allergy Status Unknown    Intolerances      FAMILY HISTORY:    Social History:       LABS                        10.3   6.91  )-----------( 234      ( 25 Nov 2024 04:39 )             33.2     11-25    137  |  103  |  73[H]  ----------------------------<  135[H]  3.6   |  21[L]  |  1.65[H]    Ca    9.2      25 Nov 2024 04:39  Phos  3.8     11-25  Mg     2.4     11-25    TPro  7.4  /  Alb  3.7  /  TBili  0.4  /  DBili  x   /  AST  19  /  ALT  33  /  AlkPhos  136[H]  11-24        Vital Signs Last 24 Hrs  T(C): 36.4 (25 Nov 2024 09:18), Max: 37.2 (24 Nov 2024 15:37)  T(F): 97.5 (25 Nov 2024 09:18), Max: 99 (24 Nov 2024 15:37)  HR: 85 (25 Nov 2024 11:00) (70 - 106)  BP: 109/65 (25 Nov 2024 11:00) (102/70 - 127/82)  BP(mean): 81 (25 Nov 2024 11:00) (81 - 99)  RR: 17 (25 Nov 2024 11:00) (15 - 18)  SpO2: 99% (25 Nov 2024 11:00) (95% - 100%)    Parameters below as of 25 Nov 2024 11:00  Patient On (Oxygen Delivery Method): tracheostomy collar  O2 Flow (L/min): 10  O2 Concentration (%): 40    PHYSICAL EXAM  General: NAD, AA0x3    Lower Extremity Focused:  Vasc: DP 1/4. PT 2/4. Tg: warm to warm.   Derm: elongated toenails x 7. No open wounds visualized. No open wounds visualized s/p nail trimming  Neuro: Unable to assess   MSK: Unable to assess    Gait Examination:    RADIOLOGY

## 2024-11-25 NOTE — ADVANCED PRACTICE NURSE CONSULT - ASSESSMENT
Unknown age male, unknown past medical history (reported stroke and MI by coworkers) presented to [facility] with AMS, Pt was working at [employer] when he was found down by coworkers. EMS called and pt brought to [facility] ED. Intubated, sedated, started on Cardene for SBPs in 200s. CT head showed brain stem bleed. Transferred to NSICU for further management.     Patient's hands with yellow hyperkeratinized skins that appear to be from skin build-up. Soaked both hands in warm soapy water and dry and most of the hyperkeratinized plaques were easily removed. Apply Atrac-Tain moisturizer with good effect. No odor noted.

## 2024-11-25 NOTE — CONSULT NOTE ADULT - ASSESSMENT
44y old  Male presented to [facility] with AMS. Podiatry consulted for nailcare. elongated toenails x 7.    Plan:    -Toenails trimmed x 7 with nail nipper  -c/w with offloading boots b/l.     Podiatry signing off, Plan d/w with attending    Patient should follow up with Dr. Kevin Morales within 1 week of discharge.    Office information:          [place] Address- [address], [place] [address] Phone: [phone]         [place] Address- [address], [place] [address] Phone: [phone]

## 2024-11-25 NOTE — PROGRESS NOTE ADULT - SUBJECTIVE AND OBJECTIVE BOX
NEUROCRITICAL CARE PROGRESS NOTE    GARETT DELEON   MRN-[id number]  Summary:    HPI:  Unknown age male, unknown past medical history (reported stroke and MI by coworkers) presented to [facility] with AMS, Pt was working at [employer] when he was found down by coworkers. EMS called and pt brought to [facility] ED. Intubated, sedated, started on cardene for SBPs in 200s. CT head showed brain stem bleed. Transferred to NSICU for further management.  (30 Sep 2024 12:55)      S/Overnight events:   BD 56, GEORGE overnight. Neuro stable.     Hospital Course:   : transferred from [facility]. A line placed. Versed dc'd. Roomate Prasanth at bedside, states pt has family in [place], cannot confirm medications or PMH other than stroke and MI. 250cc bolus 3% given. LR switched to NS. hydralazine 25q8 started, 3% started, switched propofol to precedex   10/1: stability CTH done. Added labetalol, started TF. Palliative consulted. ethics consulted to determine surrogate. febrile 103, pan cx sent  10/2: BD 2, GEORGE overnight. TF resumed. Desatt'd to 80s, FiO2 inc. to 50. Fentanyl given, ABG, CXR ordered. Maxxed on precedex, started on propofol for DARIEN -4 - -5. Precedex dc'd. Duonebs, mucomyst, hypertonic added. 3% dc'd. Cardene dc'd. Start vanc/CTX. Increased labetalol 200q8. MRSA negative, dc'd vanc. ETT pulled back 2cm x 2, good positioning after confirmatory chest xray. Ethics attempting to establish HCP with family. Na 159, starting FW 250q6 for range 150-155.   10/3: BD3, GEORGE o/n, neuro stable. Na elevating, FW increased to 300q6. Dc'd bowel reg for diarrhea. vEEG started. SQH 5000q8 tonight.   10/4: BD 4, albumn bolus, incr. LR to 80 2/2 incr. in Cr, LR to 100cc/hr for uptrending Cr. Started 7% hypersal for 48hrs and SL atropine for inline/oral thick secretions. Dc'd CTX and started ancef for MSSA in the sputum. Nephrology consulted for CKD, f/u recs. SBP 170s, given hydralazine 10mg IVP.   10/5: BD5, o/n 10mg IVP hydralazine given for SBP 170s and started on hydralazine 25q8 via OGT. 10mg IV push labetalol for SBP > 160s. RT placed for diarrhea.   10/6: BD6, o/n FW increased to 350q4 per nephrology recs. IV tylenol for temp 100.6, SBp 160s presumed uncomfortable.   10/7: BD7, overnight pancultured for temp 101.8F.   10/8: BD8. GEORGE. Cr bumped. decreased LR to 75cc/hr. Adding simethicone ATC. incr hydralazine 50mgTID. Incr labetalol 300mgTID. Na 145, decreased FWF to 250q6. Start precedex. FENa consistent with intrinsic kidney injury. Pend repeat renal US. Retaining up to 1.3L, bladder scans q6, straight cath PRN  10/9: BD 9. GEORGE overnight. Neuro stable. abd xray for distention w non-specific gas pattern, OGT to LIWS for morning. duonebs/mucomyst to q8 for improving secretions. Changed tube feeds to Jevity 1.5 20cc/hr, low rate due to abdominal distention, nepro dense and more difficult to digest. Tolerating CPAP, confirmed by ABG.   10/10: BD 10. GEORGE overnight. Neuro stable. (+) gabriel for urinary retention on bladder scan. inc TF to goal rate of 40cc/hr. family leaning toward pursuing trach/PEG. 1/2 amp for FS 81.   10/11: BD 11. GEORGE overnight. Neuro stable. Trach/PEG consults placed.   10/12: BD 12. GEORGE overnight. Neuro stable. MRI brain complete.   10/13: BD 13. Increase flomax. Hold SQH after PM dose for trach tm. IVL.   10/14: BD 14. GEORGE overnight, remains on AC/VC. Gabriel placed for urinary retention. Dc'd free water.  S/p trach with pulm. NGT placed and CXR confirmed in good position.   10/15: BD 15, GEORGE ovn. resumed feeds. spiked 101, pan cx sent.   10/16: BD 16. GEORGE ovn. Lokelma 5mg for K+ 5.4. Started vanc q 24/zosyn for empiric PNA coverage, IVF to 100/hr. PEG held for fever.   10/17: BD 17,  ordered serum osm and urine osm for am. Started sinemet for neurostimulation. Increased cardura to 0.8. Started FW 100q4, dc'd IVF. MRSA negative, dc'd vanc. NGT replaced d/t coiling.   10/18: BD 18, GEORGE overnight, neuro stable. Amantadine added for neurostim. zosyn changed to unasyn for acinetobacter baumannii, failed TOV and required SC  10/19: BD 19, GEORGE ovn. cardura 2mg added for retention. labetalol decreased 200q8, hydralazine decreased 25q8. Gabriel replaced.   10/20: BD20, GEORGE overnight. NGT dislodged, replaced. PEG tomorrow w/ gen surg, FW increased to 150q4 and labetalol decreased to 100q8, lokelma given for hyperkalemia.   10/21: BD 21. POD0 PEG placement with Gen surg. decr labetolol to 50q8, incr. cardura to 0.4, started lokelma and phoslo, dc gabriel POD0 PEG placement with Gen surg.  10/22: BD 22. Plan to start TF today via PEG. dc labetalol, Following ophtho recs. Increased apnea settings - found to be in cheyne-moe respiration. CPAP /5.  10/23: BD 23. hydralazine d/c'd, trach collar trial today. Rectal tube placed at 6am.  10/24: BD24, o/n lokelma held due to diarrhea. Free water 100q6 resumed. dc'd tamsulosin, amantadine. Incr'd cardura to 8mg qhs. Dc'd FW. Switched jevity to nepro. gabriel placed for high urine output. Started SL atropine for oral secretions. Dc'd free water.  10/25: BD25, o/n decreased suctioning requirements to > q4hrs, GEORGE. Cr improving, cont phoslo, lokelma held at this time. Gabriel placed yest, cont. Tolerating trach collar. Given 500cc plasmalyte bolus for ANIKA. Dc'd sinemet.   10/26: BD26, o/n resumed lokelma 5mg daily and resumed 100cc free water q6hrs. Change in neuro status with new right pupillary dilation with anisocoria (right pupil 6mm fixed and left pupil 3mm briskly reactive). Given 23.4% NaCl bullet, taken for emergent CTH showing mostly resolved pontine hemorrhage, continued brainstem hypodensity likely edema d/t hemorrhage, no new hemorrhage or infarct, no herniation, mild increase in size of left lateral ventricle. Vitals remaine stable. Na goal > 140.   10/27: BD27, o/n GEORGE.Neuro stable. Pend stepdown with airway bed.   10/28: BD 28. GEORGE overnight. Neuro stable. Miralax ordered. Gabriel removed, pending TOV.  10/29: BD 29. GEORGE o/n. Given 2L NS over 8 hrs for increased BUN/Cr ratio. Gabriel placed for frequent straight cath.   10/30: BD 30.   10/31: BD 31. GEORGE overnight. Na 149, increased free water to 200q6. 1L NS for uptrending BUN.   : BD 32. GEORGE overnight. Given 1L NS for dehydration. Na 146, increased FW to 250q6.   : BD 33 GEORGE overnight, neuro stable, given 500cc bolus for net negative status and tachycardia   11/3: BD 34, GEORGE overnight, neuro stable. Patient remains tachycardic, EKG showing sinus tachycardia, given additional 500cc NS bolus. Febrile to 101.9F, pan cultured (without UA), CXR WNL, given tylenol.   : BD 35, GEORGE overnight, neuro stable. Given 1L NS for tachycardia. sputum (+) for stenotropohomas maltophilia.   : BD 36 GEORGE overnight, neuro stable. Vancomycin dc'd. Chest PT BID. ID consulted, cont zosyn.  : BD 37. blood cx + klebsiella dc zosyn changed to cefepime, CTAP ordered, rpt blood cx sent.    : BD 38. Pending CT A/P, given 250cc bolus and starting maintenance fluids overnight. Pending CT A/P after bolus   : BD 39. CT CAP negative for infection.   : BD 40. GEORGE overnight.  11/10: BD 41. GEORGE overnight. desat to 85 on trach collar, O2 inc to 10L and 100%, O2 sat inc to 95. pt tachy to 110s, euvolemic. given tylenol. ABG and CXR ordered. spiked fever, pancultured, RVP negative. AM ABG w pO2 79, rpt w pO2 79. pt appears comfortable, satting 94%.   : BD 42. GEORGE overnight. pt became tachy to 130s, desat to 90 on 100% FiO2 and 10L. suctioned, (+) productive cough. temp 101.4, given 1g IV tylenol and 500cc NS bolus for euvolemia. fever and HR downtrending. LE dopplers negative for dvt  : BD 43, GEORGE ovn, fever and HR downtrending, satting 97% 70% FIO2  : BD 44, GEORGE ovn. started standing tylenol x24 hours for tachycardia. desat to 80s, o2 increased. CXR stable, pending CTA PE protocol.   : BD 45, GEORGE overnight, neuro stable. resp therapy dec FiO2 to 70%.   11/15: BD 46, GEORGE overnight, neuro stable.  Rapid called for desaturation 30s, tachycardic 140s. Patient bagged, 100% fio2, heavily suctioned. CXR/POCUS unremarkable. ABG c/w desaturation. WBC 14.71. Afebrile. O2 improved to 90s and patient upgraded to ICU. ABG paO2 30s improved to 89 on vent. IV Tylenol x 1, sputum sent. Start protonix while o-n vent.   : BD 47. POCUS showed collapsable IVCF, given 1L bolus. Vanco/Cefpime added empriic for PNA, NGT feeds restarted. MRSA swab neg, Vanc DC'd.   : BD 48. GEORGE overnight. 1l bolus for tachycardia. Spiked to 101, cultured. 500cc bolus for tachycardia, tachy to 148 given 25mcg fentanyl, 250cc albumin, 1.5L bolus. 5 IV lopressor with response HR to 100s. +Stenotrophomonas on sputum cx.   : BD 49. GEORGE overnight. Consulted ID, cefepime switched to bactrim x7days. Started hydrochlorothiazine 12.5mg daily.  : BD 50. Tachy 120s, given tylenol and 500cc NS. Tolerating 5/5, switched to TCx. Holding phos binder. D/c Bactrim. D/c gabriel, f/u TOV. Dc'd PPI.   : BD 51. GEORGE ovn. 1600 satting low 90s, mildly tachy to 110s, afebrile, RR wnl. O2 improved to mid 90s while inc O2 to 100% on TCx. CPAP 5/5 placed back on.  : BD 52, GEORGE ovn, tolerating CPAP 5/5. Switch to trach collar during the day if tolerating well. HCTZ held for Cr bump, straight cath frequence increased to q4  : BD 53, GEORGE ovn. Resumed phoslo. Gabriel placed. Resumed HCTZ.   : BD 54. Holding tylenol in setting of possible fever, will require pan cx if febrile. Cr improved today. Cont CPAP. Bowel regimen held i/s/o diarrhea. FOBT negative.  : BD 55. GEORGE overnight. Neuro stable. HCTZ dc'ed, started lisinopril 5. Lokelma dc'ed for K 3.7.   : BD 56, GEORGE overnight. Neuro stable.     Vital Signs Last 24 Hrs  T(C): 36.9 (2024 22:41), Max: 37.2 (2024 15:37)  T(F): 98.4 (2024 22:41), Max: 99 (2024 15:37)  HR: 94 (2024 23:00) (80 - 112)  BP: 115/83 (2024 23:00) (100/66 - 115/83)  BP(mean): 95 (2024 23:00) (76 - 95)  RR: 17 (2024 23:00) (17 - 20)  SpO2: 98% (2024 23:00) (94% - 99%)    Parameters below as of 2024 23:00  Patient On (Oxygen Delivery Method): tracheostomy collar  O2 Flow (L/min): 10  O2 Concentration (%): 40    Mode: standby    I&O's Detail    2024 07:01  -  2024 07:00  --------------------------------------------------------  IN:    Enteral Tube Flush: 150 mL    Free Water: 750 mL    Miscellaneous Tube Feedin mL    Sodium Chloride 0.9% Bolus: 1000 mL  Total IN: 2900 mL    OUT:    Indwelling Catheter - Urethral (mL): 2050 mL  Total OUT: 2050 mL    Total NET: 850 mL      2024 07:01  -  2024 00:02  --------------------------------------------------------  IN:    Enteral Tube Flush: 310 mL    Free Water: 250 mL    Miscellaneous Tube Feedin mL  Total IN: 1220 mL    OUT:    Indwelling Catheter - Urethral (mL): 1385 mL  Total OUT: 1385 mL    Total NET: -165 mL    Physical Exam   Constitutional: Lying in bed, in NAD  Respiratory: +Trach collar, breathing non-labored, symmetrical chest wall movement  Cardiovascuar: RRR, no murmurs  Gastrointestinal: +PEG, abdomen soft, non tender  Genitourinary: exam deffered  Neurological:   Opens eyes spontaneously, intermittently following commands. Awake and alert but not oriented.  Cranial Nerves: PERRL 3mm brisk, tracks vertically, facial movements appear symmetric, tongue midline. No verbal output.   Motor: LUE/LLE withdraws to noxious, RUE trace movement in fingers spontaneously, RLE wiggles toes to command.   Sensation: intact to light touch in all extremities        LABS:                        10.9   9.68  )-----------( 246      ( 2024 05:30 )             35.3     11-24    141  |  106  |  74[H]  ----------------------------<  130[H]  3.9   |  23  |  1.72[H]    Ca    9.2      2024 20:28  Phos  4.2       Mg     2.4         TPro  7.4  /  Alb  3.7  /  TBili  0.4  /  DBili  x   /  AST  19  /  ALT  33  /  AlkPhos  136[H]  11-24      Urinalysis Basic - ( 2024 20:28 )    Color: x / Appearance: x / SG: x / pH: x  Gluc: 130 mg/dL / Ketone: x  / Bili: x / Urobili: x   Blood: x / Protein: x / Nitrite: x   Leuk Esterase: x / RBC: x / WBC x   Sq Epi: x / Non Sq Epi: x / Bacteria: x          CAPILLARY BLOOD GLUCOSE      POCT Blood Glucose.: 97 mg/dL (2024 23:34)  POCT Blood Glucose.: 132 mg/dL (2024 17:34)  POCT Blood Glucose.: 99 mg/dL (2024 11:52)  POCT Blood Glucose.: 104 mg/dL (2024 11:31)  POCT Blood Glucose.: 130 mg/dL (2024 05:58)      Drug Levels: [] N/A    CSF Analysis: [] N/A      Allergies    Allergy Status Unknown    Intolerances      MEDICATIONS:  Antibiotics:    Neuro:    Anticoagulation:  heparin   Injectable 5000 Unit(s) SubCutaneous every 8 hours    OTHER:  acetylcysteine 10%  Inhalation 4 milliLiter(s) Inhalation every 8 hours  albuterol/ipratropium for Nebulization 3 milliLiter(s) Nebulizer every 8 hours  amLODIPine   Tablet 10 milliGRAM(s) Oral daily  artificial tears (preservative free) Ophthalmic Solution 1 Drop(s) Right EYE every 4 hours  chlorhexidine 0.12% Liquid 15 milliLiter(s) Oral Mucosa every 12 hours  chlorhexidine 2% Cloths 1 Application(s) Topical <User Schedule>  doxazosin 8 milliGRAM(s) Oral at bedtime  insulin lispro (ADMELOG) corrective regimen sliding scale   SubCutaneous every 6 hours  petrolatum Ophthalmic Ointment 1 Application(s) Both EYES two times a day  sevelamer carbonate 800 milliGRAM(s) Oral two times a day    IVF:  calcium acetate 667 milliGRAM(s) Oral every 12 hours    CULTURES:  Culture Results:   No growth at 5 days ( @ 05:59)  Culture Results:   Mixed gram negative rods including  Moderate Stenotrophomonas maltophilia  Commensal iram consistent with body site ( @ 01:53)    Assessment   45 y/o PMH ?stroke/MI present to [facility] after collapsing at work. Decorticate posturing, vomiting, intubated for airway protection. 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 Transferred to [facility] for further management. s/p trach 10/14. s/p peg 10/21. Re-upgrade to ICU /2 desaturation event and suctioning requirements 11/15.     PLAN:  Neuro:  - neuro q4/vitals q4  - pain control: tylenol held i/s/o low grade fever  - vEEG (10/3-)- negative, (10/17-10/19) - negative  - CTH : enlarged pontine hemorrhage, CTH 10/3: stable, CTH 10/25: mostly resolved pontine hemorrhage, no new hemorrhage or infarct, no herniation  - MRI brain 10/12: parenchymal hemorrhage, acute/subacute R cerebellar stroke    - stroke core measures, stroke neuro signed off    CV:  - -160  - HTN: amlodipine 10 mg qd, HCTZ 12.5 dc'ed switched to lisinopril 5    - echo () EF 75%    Resp:  - trach collar w/ cpap / overnight  - Secretions: duonebs/mucomyst/chest PT q8h   - CTA chest  negative for PE, inc ground glass opacities    GI:  - TF via PEG (placed 10/21 by gen surg)  - bowel regimen held i/s/o diarrhea, last BM   - CTAP  negative for infection    - FOBT negative     Renal:  - IVL  - FW flushes 250cc q6hrs for uptrending BUN/Cr  - hyperK: held lokelma 5mg qd  - Hyperphos: added phoslo BID, renvela x 2 d (-)  - Urinary retenion: Cardura 8 mg, +gabriel replaced   - CKD: trend BUN/Cr  - renal US 10/1: echogenicity c/w chronic med renal dz, repeat 10/8: inc renal echogeicity, c/f medical renal disease w increased hydro     Endo:  - ISS  - A1c 5.4    Heme:  - DVT ppx: SCDs, SQH 5000u q8h   - LE dopplers negative     ID:  - febrile, last pancx , MRSA swab neg    -  s/p Stenotrophomonas maltophilia PNA: s/p Bactrim (-) s/p Cefepime (-)  - 11/3, (+) sputum for stenotrophomas maltophlia, blood cx (+) klebsiella, cefepime 2gq12 ( - )   - empiric tx: s/p zosyn (11/3-) , s/p vanc  (11/3- )  - S/p Ancef (10/4-10/14) for PNA, and s/p Unasyn (10/18-10/23) +actinobacter baumanii    MISC:  - ophtho consult for keratitis, s/p erythromycin ointment to rt eye q4hrs, ofloxacin ointment to rt eye QID, artificial tears to rt eye q2hrs, moisture chamber at bedtime    Dispo: NSICU status, full code, pending placement and emergency medicaid     D/w Dr. D'Amico and Abdirizak

## 2024-11-26 LAB
ANION GAP SERPL CALC-SCNC: 11 MMOL/L — SIGNIFICANT CHANGE UP (ref 5–17)
BUN SERPL-MCNC: 71 MG/DL — HIGH (ref 7–23)
CALCIUM SERPL-MCNC: 9.1 MG/DL — SIGNIFICANT CHANGE UP (ref 8.4–10.5)
CHLORIDE SERPL-SCNC: 107 MMOL/L — SIGNIFICANT CHANGE UP (ref 96–108)
CO2 SERPL-SCNC: 21 MMOL/L — LOW (ref 22–31)
CREAT SERPL-MCNC: 1.46 MG/DL — HIGH (ref 0.5–1.3)
EGFR: 60 ML/MIN/1.73M2 — SIGNIFICANT CHANGE UP
EGFR: 60 ML/MIN/1.73M2 — SIGNIFICANT CHANGE UP
GLUCOSE SERPL-MCNC: 104 MG/DL — HIGH (ref 70–99)
HCT VFR BLD CALC: 31.8 % — LOW (ref 39–50)
HGB BLD-MCNC: 9.9 G/DL — LOW (ref 13–17)
MAGNESIUM SERPL-MCNC: 2.3 MG/DL — SIGNIFICANT CHANGE UP (ref 1.6–2.6)
MCHC RBC-ENTMCNC: 28.9 PG — SIGNIFICANT CHANGE UP (ref 27–34)
MCHC RBC-ENTMCNC: 31.1 G/DL — LOW (ref 32–36)
MCV RBC AUTO: 92.7 FL — SIGNIFICANT CHANGE UP (ref 80–100)
NRBC # BLD: 0 /100 WBCS — SIGNIFICANT CHANGE UP (ref 0–0)
NRBC BLD-RTO: 0 /100 WBCS — SIGNIFICANT CHANGE UP (ref 0–0)
PHOSPHATE SERPL-MCNC: 3.3 MG/DL — SIGNIFICANT CHANGE UP (ref 2.5–4.5)
PLATELET # BLD AUTO: 239 K/UL — SIGNIFICANT CHANGE UP (ref 150–400)
POTASSIUM SERPL-MCNC: 4.1 MMOL/L — SIGNIFICANT CHANGE UP (ref 3.5–5.3)
POTASSIUM SERPL-SCNC: 4.1 MMOL/L — SIGNIFICANT CHANGE UP (ref 3.5–5.3)
RBC # BLD: 3.43 M/UL — LOW (ref 4.2–5.8)
RBC # FLD: 14.6 % — HIGH (ref 10.3–14.5)
SODIUM SERPL-SCNC: 139 MMOL/L — SIGNIFICANT CHANGE UP (ref 135–145)
WBC # BLD: 7.05 K/UL — SIGNIFICANT CHANGE UP (ref 3.8–10.5)
WBC # FLD AUTO: 7.05 K/UL — SIGNIFICANT CHANGE UP (ref 3.8–10.5)

## 2024-11-26 PROCEDURE — 99233 SBSQ HOSP IP/OBS HIGH 50: CPT

## 2024-11-26 RX ADMIN — Medication 1 APPLICATION(S): at 06:01

## 2024-11-26 RX ADMIN — Medication 2 TABLET(S): at 22:06

## 2024-11-26 RX ADMIN — HEPARIN SODIUM 5000 UNIT(S): 1000 INJECTION INTRAVENOUS; SUBCUTANEOUS at 13:29

## 2024-11-26 RX ADMIN — Medication 1 DROP(S): at 13:28

## 2024-11-26 RX ADMIN — IPRATROPIUM BROMIDE AND ALBUTEROL SULFATE 3 MILLILITER(S): .5; 2.5 SOLUTION RESPIRATORY (INHALATION) at 16:13

## 2024-11-26 RX ADMIN — Medication 15 MILLILITER(S): at 18:00

## 2024-11-26 RX ADMIN — ACETYLCYSTEINE 4 MILLILITER(S): 200 INHALANT RESPIRATORY (INHALATION) at 06:02

## 2024-11-26 RX ADMIN — HEPARIN SODIUM 5000 UNIT(S): 1000 INJECTION INTRAVENOUS; SUBCUTANEOUS at 06:00

## 2024-11-26 RX ADMIN — HEPARIN SODIUM 5000 UNIT(S): 1000 INJECTION INTRAVENOUS; SUBCUTANEOUS at 22:06

## 2024-11-26 RX ADMIN — Medication 15 MILLILITER(S): at 06:01

## 2024-11-26 RX ADMIN — Medication 1 DROP(S): at 22:05

## 2024-11-26 RX ADMIN — ACETYLCYSTEINE 4 MILLILITER(S): 200 INHALANT RESPIRATORY (INHALATION) at 21:19

## 2024-11-26 RX ADMIN — Medication 1 DROP(S): at 06:01

## 2024-11-26 RX ADMIN — Medication 1 DROP(S): at 18:00

## 2024-11-26 RX ADMIN — Medication 1 DROP(S): at 02:08

## 2024-11-26 RX ADMIN — IPRATROPIUM BROMIDE AND ALBUTEROL SULFATE 3 MILLILITER(S): .5; 2.5 SOLUTION RESPIRATORY (INHALATION) at 06:02

## 2024-11-26 RX ADMIN — Medication 1 APPLICATION(S): at 18:01

## 2024-11-26 RX ADMIN — ACETYLCYSTEINE 4 MILLILITER(S): 200 INHALANT RESPIRATORY (INHALATION) at 16:13

## 2024-11-26 RX ADMIN — IPRATROPIUM BROMIDE AND ALBUTEROL SULFATE 3 MILLILITER(S): .5; 2.5 SOLUTION RESPIRATORY (INHALATION) at 21:18

## 2024-11-26 RX ADMIN — DOXAZOSIN MESYLATE 8 MILLIGRAM(S): 8 TABLET ORAL at 22:05

## 2024-11-26 RX ADMIN — Medication 667 MILLIGRAM(S): at 06:01

## 2024-11-26 RX ADMIN — Medication 1 DROP(S): at 11:03

## 2024-11-26 NOTE — PROGRESS NOTE ADULT - SUBJECTIVE AND OBJECTIVE BOX
=================================  NEUROCRITICAL CARE ATTENDING NOTE  =================================    GARETT DELEON   MRN-[id number]  Summary:  44y/M  unknown past medical history (reported stroke and MI by coworkers) presented to [facility] with AMS, Pt was working at [employer] when he was found down by coworkers. EMS called and pt brought to [facility] ED. Intubated, sedated, started on cardene for SBPs in 200s. CT head showed brain stem bleed. Transferred to NSICU for further management.  (30 Sep 2024 12:55)    COURSE IN THE HOSPITAL:  Date	POD	BD	Events	Surgery  	-	-	Transferred from [facility]. A-line placed. Versed discontinued. Hydralazine, 3% saline started. Changed propofol to precedex.	-  10/1	-	-	Stability CTH done. Added labetalol, started TF. Palliative and ethics consulted. Febrile 103°F, pan cultures sent.	-  10/2	-		Desaturation to 80s, FiO2 increased. Fentanyl given, ABG, CXR ordered. Propofol started. Vanc/CTX started. MRSA negative, vanc discontinued. Na 159, starting FW 250q6.	-  10/3	-	3	Na increasing, FW increased to 300q6. Bowel regimen discontinued. vEEG started.	-  10/4	-		Albumin bolus, increased LR. 7% hypersal started for 48hrs. Nephrology consulted for CKD. SBP 170s, hydralazine given.	-  10/5	-		Hydralazine and labetalol for SBP. RT placed for diarrhea.	-  10/6	-		FW increased to 350q4. IV Tylenol for temp 100.6°F.	-  10/7	-		Pancultured for temp 101.8°F.	-  10/8	-		Cr increase, LR decreased. Simethicone added. Increased hydralazine, labetalol. Na 145, FW 250q6. Retaining fluid, bladder scans ordered.	-  10/9	-		Abd x-ray for distention. Changed tube feeds to Jevity 1.5. Tolerating CPAP.	-  10/10	-	10	Vuong for urinary retention. Increased TF to goal. Family leaning toward trach/PEG.	-  10/11	-		Trach/PEG consults placed.	-  10/12	-		MRI brain complete.	-  10/13	-	13	Increased flomax. Hold SQH for trach.	-  10/14	-		S/p trach with pulmonary. NGT placed, CXR confirmed position.	-  10/15	-	15	Resumed feeds. Spiked fever 101°F, pan cultures sent.	-  10/16	-	16	Lokelma for K+ 5.4. Started vanc/zosyn for PNA. PEG held for fever.	-  10/17	-	17	Serum and urine osm ordered. Started sinemet, FW 100q4, IVF discontinued. MRSA negative, vanc discontinued. NGT replaced.	-  10/18	-	18	Amantadine added. Changed zosyn to unasyn for acinetobacter baumannii. Failed TOV, required SC.	-  10/19	-	19	Cardura added for retention. Decreased labetalol, hydralazine. Vuong replaced.	-  10/20	-	20	NGT dislodged, replaced. PEG scheduled for tomorrow. Lokelma for hyperkalemia.  10/21	-	21	s/p PEG  10/22	-	22	No significant events overnight. 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  trach collar 40%  10/24   -	24	No significant events overnight.   10/25   -	25	No significant events overnight.   10/26   -	26	Anisocoria overnight, given 23.4% x1, CT stable; briefly back on vent, ABG unremarkable, now back on trach collar; exam - following commands!  10/27   -	27	No significant events overnight.     Date	POD	BD	Events	  10/19		19	Cardura 2mg added for retention. Labetalol decreased. Vuong replaced.	  10/20		20	NGT dislodged, replaced. PEG placement scheduled for tomorrow. Free water increased. Labetalol decreased. Lokelma given for hyperkalemia.	  10/21	0	21	PEG placement with Gen Surg. Labetalol decreased. Cardura increased. Started Lokelma and Phoslo. Vuong discontinued.	PEG placement with Gen Surg  10/22		22	Plan to start tube feeding via PEG. Labetalol discontinued. CPAP 5/ for Cheyne-Vaughn respiration.	  10/23		23	Hydralazine discontinued. Trach collar trial. Rectal tube placed.	  10/24		24	Lokelma held for diarrhea. Cardura increased. Jevity switched to Nepro. Vuong placed for high urine output.	  10/25		25	Change in neuro status: anisocoria. Emergent CTH: resolved pontine hemorrhage, continued edema.	  10/26		26	Lokelma and free water resumed. 23.4% NaCl given. Emergent CTH: resolved pontine hemorrhage, continued edema.	  10/27		27	Neuro stable.	  10/28		28	Neuro stable. Miralax ordered. Vuong removed, pending TOV.	  10/29		29	2L NS over 8 hrs for increased BUN/Cr ratio. Vuong placed.	  10/31		31	Na 149, increased free water to 200q6. 1L NS for uptrending BUN.	  		32	1L NS for dehydration. Na 146, increased FW to 250q6.	  		33	Neuro stable. 500cc bolus for tachycardia.	  11/3		34	Neuro stable. Febrile to 101.9°F, pan cultured. 500cc NS bolus for sinus tachycardia.	  		35	Neuro stable. 1L NS for tachycardia. Sputum (+) for Stenotrophomonas.	  		36	Neuro stable. Vancomycin discontinued. Chest PT BID. ID consulted. Continue Zosyn.	  		37	Blood cx (+) for Klebsiella. Zosyn changed to Cefepime. CTAP ordered.	  		38	Maintenance fluids started. Pending CT A/P.	  		39	CT CAP negative for infection.	  11/10		41	Desat to 85 on trach collar, O2 increased. Sat 94%. Fever, pancultured. AM ABG pO2 79.	  		42	Tachycardia 130s, desat to 90 on 10L. Cough, temp 101.4, given 1g IV Tylenol and 500cc NS. Fever and HR downtrending.	  		43	Fever and HR downtrending, satting 97% on 70% FIO2.	  		44	Standing Tylenol for tachycardia. Desat to 80s, O2 increased. Pending CTA PE protocol.	  		45	Neuro stable. Resp therapy decreased FiO2 to 70%.	  11/15		46	Rapid called for desat 30s, tachycardia 140s. Upgraded to ICU. Vent started. POCUS and CXR unremarkable. ABG improved.	  		47	POCUS showed collapsible IVC, given 1L bolus. Vanco/Cefepime started for PNA. MRSA swab negative, Vanco discontinued.	  		48	1L bolus for tachycardia. Fever 101, cultured. 1.5L bolus, 5 IV Lopressor. Stenotrophomonas on sputum cx.	  		49	Consulted ID, Cefepime switched to Bactrim x7 days. Started Hydrochlorothiazine 12.5mg daily.	  		50	Tachycardia 120s, given Tylenol and 500cc NS. Tolerating 5/5, switched to TCx. Bactrim discontinued. Vuong discontinued.	  		51	Satting low 90s, mildly tachycardic to 110s. O2 improved on TCx. CPAP 5/5 placed back on.	  		52	Tolerating CPAP . HCTZ held for Cr bump, straight cath increased.	  		53	Resumed Phoslo. Vuong placed. Resumed HCTZ.	  		54	Holding Tylenol for possible fever, pan culture if febrile. Cr improved. Cont. CPAP.	  		55	Neuro stable. HCTZ dc'd, started Lisinopril 5. Lokelma dc'd for K 3.7.	  		56	Neuro stable. hypotension to 60s SBP, given fluid bolus   No significant events overnight.     Past Medical History: Acute myocardial infarction CVA (cerebral vascular accident)  Allergies:  Allergy Status Unknown  Home meds:     PHYSICAL EXAMINATION  T(C): 36.8 (24 @ 04:25), Max: 37.4 (24 @ 22:36) HR: 73 (24 @ 06:03) (65 - 89) BP: 102/68 (24 @ 06:00) (71/44 - 119/79) RR: 18 (24 @ 06:03) (15 - 20) SpO2: 98% (24 @ 06:03) (95% - 100%)  NEUROLOGIC EXAMINATION:  Patient awake, alert, OE spontaneously, tracks, FC R UE (attempts to squeeze to command), RANDELL , RUE 1/5 L UE withdraws, R LE wiggles, withdraws, L LE withdraws   GENERAL: trach collar 40%  EENT:  anicteric, R eye erythema   CARDIOVASCULAR: (+) S1 S2, normal rate and regular rhythm  PULMONARY: clear to auscultation bilaterally (oral secretions q2-3)  ABDOMEN: soft, nontender with normoactive bowel sounds  EXTREMITIES: no edema  SKIN: no rash    LABS:   CAPILLARY BLOOD GLUCOSE 111             (6.91)  9.9  (10.3)  7.05  )-----------( 239      ( 2024 05:30 )             31.8     139  |  107  |  71[H]  ----------------------------<  104[H]  4.1   |  21[L]  |  1.46[H] (1.65)    Ca    9.1      2024 05:30  Phos  3.3       Mg     2.3         TPro  7.4  /  Alb  3.7  /  TBili  0.4  /  DBili  x   /  AST  19  /  ALT  33  /  AlkPhos  136[H]   @ 07:01  -   @ 07:00  --------------------------------------------------------  IN: 3260 mL / OUT: 1475 mL / NET: 1785 mL    Bacteriology:  10/16 sputum culture acinetobacter  10/15 Blood CS NG5D    CSF studies:  EEG:  Neuroimagin2024 9:30 AM	CT Brain	Large pontine hemorrhage, SAH, edema, no hydrocephalus. Multiple old infarcts.  2024 1:11 PM	CTA Head/Neck	Pontine hemorrhage stable, no malformation, stable ventricular size, no stenosis or aneurysm.  2024 6:40 PM	CT Brain	Enlarged pontine hemorrhage, SAH in frontal lobes.  2024        	CT Brain	Stable pontine hemorrhage, SAH stable.  10/03/2024 11:06 AM	CT Brain	Stable hematoma, no rebleeding.  10/12/2024 9:16 AM	MRI Brain	Stable hemorrhage, no new bleeding, cerebellar infarct.    Other imagin2024 7:20PM	CTA CHEST	No PE to lobar arteries; aspiration in RLL with groundglass opacity.  2024 7:50PM	US DPLX LE	No DVT in either lower extremity.  2024 6:47AM	CT ABD/PELVIS	Atelectasis; can't exclude pneumonia; no abdominal/pelvic infection.  10/25/2024 12:56AM	CT BRAIN	Evolving pontine hemorrhage, no rebleeding, worsened R mastoid opacification.  10/19/2024 1:13PM	US DPLX LE	No DVT in either lower extremity.  10/01/2024 11:49 AM	US Retroperit	No hydronephrosis; chronic renal disease; Vuong catheter.  10/01/2024 11:14 AM	XR Abdomen	Orogastric tube coiled in stomach; nonspecific bowel gas pattern.  10/07/2024 10:25 PM	XR Abdomen	Nonspecific bowel gas pattern.  10/08/2024 8:01 AM	US Retroperit	Normal kidneys; distended bladder; mild hydronephrosis.  10/10/2024 7:32 AM	XR Abdomen	Nasogastric tube; nonspecific bowel gas pattern.  10/19/2024 1:13 PM	US DPLX Veins	No DVT in lower extremities.    MEDICATIONS:     ·	heparin   Injectable 5000 SubCutaneous every 8 hours  ·	doxazosin 8 milliGRAM(s) Oral at bedtime  ·	acetylcysteine 10%  Inhalation 4 Inhalation every 8 hours  ·	albuterol/ipratropium for Nebulization 3 Nebulizer every 8 hours  ·	senna 2 Oral at bedtime  ·	artificial tears (preservative free) Ophthalmic Solution 1 Right EYE every 4 hours  ·	calcium acetate 667 Oral every 12 hours  ·	petrolatum Ophthalmic Ointment 1 Both EYES two times a day  ·	acetaminophen   Oral Liquid .. 650 Oral every 6 hours PRN    IV FLUIDS: IVL  DRIPS:  DIET: [place] at goal  Lines:   Drains:   Vuong (reinserted )  Wounds:    CODE STATUS:  Full Code                       GOALS OF CARE:  aggressive                      DISPOSITION:  ICU  ICH Score 3 =================================  NEUROCRITICAL CARE ATTENDING NOTE  =================================    GARETT DELEON   MRN-[id number]  Summary:  44y/M  unknown past medical history (reported stroke and MI by coworkers) presented to [facility] with AMS, Pt was working at [employer] when he was found down by coworkers. EMS called and pt brought to [facility] ED. Intubated, sedated, started on cardene for SBPs in 200s. CT head showed brain stem bleed. Transferred to NSICU for further management.  (30 Sep 2024 12:55)    COURSE IN THE HOSPITAL:  Date	POD	BD	Events	Surgery  	-	-	Transferred from [facility]. A-line placed. Versed discontinued. Hydralazine, 3% saline started. Changed propofol to precedex.	-  10/1	-	-	Stability CTH done. Added labetalol, started TF. Palliative and ethics consulted. Febrile 103°F, pan cultures sent.	-  10/2	-		Desaturation to 80s, FiO2 increased. Fentanyl given, ABG, CXR ordered. Propofol started. Vanc/CTX started. MRSA negative, vanc discontinued. Na 159, starting FW 250q6.	-  10/3	-	3	Na increasing, FW increased to 300q6. Bowel regimen discontinued. vEEG started.	-  10/4	-		Albumin bolus, increased LR. 7% hypersal started for 48hrs. Nephrology consulted for CKD. SBP 170s, hydralazine given.	-  10/5	-		Hydralazine and labetalol for SBP. RT placed for diarrhea.	-  10/6	-		FW increased to 350q4. IV Tylenol for temp 100.6°F.	-  10/7	-		Pancultured for temp 101.8°F.	-  10/8	-		Cr increase, LR decreased. Simethicone added. Increased hydralazine, labetalol. Na 145, FW 250q6. Retaining fluid, bladder scans ordered.	-  10/9	-		Abd x-ray for distention. Changed tube feeds to Jevity 1.5. Tolerating CPAP.	-  10/10	-	10	Vuong for urinary retention. Increased TF to goal. Family leaning toward trach/PEG.	-  10/11	-		Trach/PEG consults placed.	-  10/12	-		MRI brain complete.	-  10/13	-	13	Increased flomax. Hold SQH for trach.	-  10/14	-		S/p trach with pulmonary. NGT placed, CXR confirmed position.	-  10/15	-	15	Resumed feeds. Spiked fever 101°F, pan cultures sent.	-  10/16	-	16	Lokelma for K+ 5.4. Started vanc/zosyn for PNA. PEG held for fever.	-  10/17	-	17	Serum and urine osm ordered. Started sinemet, FW 100q4, IVF discontinued. MRSA negative, vanc discontinued. NGT replaced.	-  10/18	-	18	Amantadine added. Changed zosyn to unasyn for acinetobacter baumannii. Failed TOV, required SC.	-  10/19	-	19	Cardura added for retention. Decreased labetalol, hydralazine. Vuong replaced.	-  10/20	-	20	NGT dislodged, replaced. PEG scheduled for tomorrow. Lokelma for hyperkalemia.  10/21	-	21	s/p PEG  10/22	-	22	No significant events overnight. 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  trach collar 40%  10/24   -	24	No significant events overnight.   10/25   -	25	No significant events overnight.   10/26   -	26	Anisocoria overnight, given 23.4% x1, CT stable; briefly back on vent, ABG unremarkable, now back on trach collar; exam - following commands!  10/27   -	27	No significant events overnight.     Date	POD	BD	Events	  10/19		19	Cardura 2mg added for retention. Labetalol decreased. Vuong replaced.	  10/20		20	NGT dislodged, replaced. PEG placement scheduled for tomorrow. Free water increased. Labetalol decreased. Lokelma given for hyperkalemia.	  10/21	0	21	PEG placement with Gen Surg. Labetalol decreased. Cardura increased. Started Lokelma and Phoslo. Vuong discontinued.	PEG placement with Gen Surg  10/22		22	Plan to start tube feeding via PEG. Labetalol discontinued. CPAP 5/ for Cheyne-Vaughn respiration.	  10/23		23	Hydralazine discontinued. Trach collar trial. Rectal tube placed.	  10/24		24	Lokelma held for diarrhea. Cardura increased. Jevity switched to Nepro. Vuong placed for high urine output.	  10/25		25	Change in neuro status: anisocoria. Emergent CTH: resolved pontine hemorrhage, continued edema.	  10/26		26	Lokelma and free water resumed. 23.4% NaCl given. Emergent CTH: resolved pontine hemorrhage, continued edema.	  10/27		27	Neuro stable.	  10/28		28	Neuro stable. Miralax ordered. Vuong removed, pending TOV.	  10/29		29	2L NS over 8 hrs for increased BUN/Cr ratio. Vuong placed.	  10/31		31	Na 149, increased free water to 200q6. 1L NS for uptrending BUN.	  		32	1L NS for dehydration. Na 146, increased FW to 250q6.	  		33	Neuro stable. 500cc bolus for tachycardia.	  11/3		34	Neuro stable. Febrile to 101.9°F, pan cultured. 500cc NS bolus for sinus tachycardia.	  		35	Neuro stable. 1L NS for tachycardia. Sputum (+) for Stenotrophomonas.	  		36	Neuro stable. Vancomycin discontinued. Chest PT BID. ID consulted. Continue Zosyn.	  		37	Blood cx (+) for Klebsiella. Zosyn changed to Cefepime. CTAP ordered.	  		38	Maintenance fluids started. Pending CT A/P.	  		39	CT CAP negative for infection.	  11/10		41	Desat to 85 on trach collar, O2 increased. Sat 94%. Fever, pancultured. AM ABG pO2 79.	  		42	Tachycardia 130s, desat to 90 on 10L. Cough, temp 101.4, given 1g IV Tylenol and 500cc NS. Fever and HR downtrending.	  		43	Fever and HR downtrending, satting 97% on 70% FIO2.	  		44	Standing Tylenol for tachycardia. Desat to 80s, O2 increased. Pending CTA PE protocol.	  		45	Neuro stable. Resp therapy decreased FiO2 to 70%.	  11/15		46	Rapid called for desat 30s, tachycardia 140s. Upgraded to ICU. Vent started. POCUS and CXR unremarkable. ABG improved.	  		47	POCUS showed collapsible IVC, given 1L bolus. Vanco/Cefepime started for PNA. MRSA swab negative, Vanco discontinued.	  		48	1L bolus for tachycardia. Fever 101, cultured. 1.5L bolus, 5 IV Lopressor. Stenotrophomonas on sputum cx.	  		49	Consulted ID, Cefepime switched to Bactrim x7 days. Started Hydrochlorothiazine 12.5mg daily.	  		50	Tachycardia 120s, given Tylenol and 500cc NS. Tolerating 5/5, switched to TCx. Bactrim discontinued. Vuong discontinued.	  		51	Satting low 90s, mildly tachycardic to 110s. O2 improved on TCx. CPAP 5/5 placed back on.	  		52	Tolerating CPAP . HCTZ held for Cr bump, straight cath increased.	  		53	Resumed Phoslo. Vuong placed. Resumed HCTZ.	  		54	Holding Tylenol for possible fever, pan culture if febrile. Cr improved. Cont. CPAP.	  		55	Neuro stable. HCTZ dc'd, started Lisinopril 5. Lokelma dc'd for K 3.7.	  		56	Neuro stable. hypotension to 60s SBP, given fluid bolus   No significant events overnight.     Past Medical History: Acute myocardial infarction CVA (cerebral vascular accident)  Allergies:  Allergy Status Unknown  Home meds:     PHYSICAL EXAMINATION  T(C): 36.8 (24 @ 04:25), Max: 37.4 (24 @ 22:36) HR: 73 (24 @ 06:03) (65 - 89) BP: 102/68 (24 @ 06:00) (71/44 - 119/79) RR: 18 (24 @ 06:03) (15 - 20) SpO2: 98% (24 @ 06:03) (95% - 100%)  NEUROLOGIC EXAMINATION:  Patient awake, alert, OE spontaneously, tracks, FC R UE (attempts to squeeze to command), RANDELL , RUE 1/5 L UE withdraws, R LE wiggles, withdraws, L LE withdraws   GENERAL: trach collar 40%  EENT:  anicteric,  CARDIOVASCULAR: (+) S1 S2, normal rate and regular rhythm  PULMONARY: clear to auscultation bilaterally (oral secretions q2-3)  ABDOMEN: soft, nontender with normoactive bowel sounds  EXTREMITIES: no edema  SKIN: no rash    LABS:   CAPILLARY BLOOD GLUCOSE 111             (6.91)  9.9  (10.3)  7.05  )-----------( 239      ( 2024 05:30 )             31.8     139  |  107  |  71[H] (73)  ----------------------------<  104[H]  4.1   |  21[L]  |  1.46[H] (1.65)    Ca    9.1      2024 05:30  Phos  3.3       Mg     2.3         TPro  7.4  /  Alb  3.7  /  TBili  0.4  /  DBili  x   /  AST  19  /  ALT  33  /  AlkPhos  136[H]   @ 07:01  -   @ 07:00  --------------------------------------------------------  IN: 3260 mL / OUT: 1475 mL / NET: 1785 mL    Bacteriology:  10/16 sputum culture acinetobacter  10/15 Blood CS NG5D      Culture - Sputum (collected )  Gram Stain ():    Few polymorphonuclear leukocytes per low power field    Rare Squamous epithelial cells per low power field    Rare Gram Negative Rods per oil power field    CSF studies:  EEG:  Neuroimagin2024 9:30 AM	CT Brain	Large pontine hemorrhage, SAH, edema, no hydrocephalus. Multiple old infarcts.  2024 1:11 PM	CTA Head/Neck	Pontine hemorrhage stable, no malformation, stable ventricular size, no stenosis or aneurysm.  2024 6:40 PM	CT Brain	Enlarged pontine hemorrhage, SAH in frontal lobes.  2024        	CT Brain	Stable pontine hemorrhage, SAH stable.  10/03/2024 11:06 AM	CT Brain	Stable hematoma, no rebleeding.  10/12/2024 9:16 AM	MRI Brain	Stable hemorrhage, no new bleeding, cerebellar infarct.    Other imagin2024 7:20PM	CTA CHEST	No PE to lobar arteries; aspiration in RLL with groundglass opacity.  2024 7:50PM	US DPLX LE	No DVT in either lower extremity.  2024 6:47AM	CT ABD/PELVIS	Atelectasis; can't exclude pneumonia; no abdominal/pelvic infection.  10/25/2024 12:56AM	CT BRAIN	Evolving pontine hemorrhage, no rebleeding, worsened R mastoid opacification.  10/19/2024 1:13PM	US DPLX LE	No DVT in either lower extremity.  10/01/2024 11:49 AM	US Retroperit	No hydronephrosis; chronic renal disease; Vuong catheter.  10/01/2024 11:14 AM	XR Abdomen	Orogastric tube coiled in stomach; nonspecific bowel gas pattern.  10/07/2024 10:25 PM	XR Abdomen	Nonspecific bowel gas pattern.  10/08/2024 8:01 AM	US Retroperit	Normal kidneys; distended bladder; mild hydronephrosis.  10/10/2024 7:32 AM	XR Abdomen	Nasogastric tube; nonspecific bowel gas pattern.  10/19/2024 1:13 PM	US DPLX Veins	No DVT in lower extremities.    MEDICATIONS:     ·	heparin   Injectable 5000 SubCutaneous every 8 hours  ·	doxazosin 8 milliGRAM(s) Oral at bedtime  ·	acetylcysteine 10%  Inhalation 4 Inhalation every 8 hours  ·	albuterol/ipratropium for Nebulization 3 Nebulizer every 8 hours  ·	senna 2 Oral at bedtime  ·	artificial tears (preservative free) Ophthalmic Solution 1 Right EYE every 4 hours  ·	calcium acetate 667 Oral every 12 hours  ·	petrolatum Ophthalmic Ointment 1 Both EYES two times a day  ·	acetaminophen   Oral Liquid .. 650 Oral every 6 hours PRN    IV FLUIDS: IVL  DRIPS:  DIET: [place] at goal  Lines:   Drains:     Wounds:    CODE STATUS:  Full Code                       GOALS OF CARE:  aggressive                      DISPOSITION:  ICU  ICH Score 3

## 2024-11-26 NOTE — PROGRESS NOTE ADULT - SUBJECTIVE AND OBJECTIVE BOX
NEUROCRITICAL CARE PROGRESS NOTE    GARETT DELEON   MRN-[id number]    HPI:  Unknown age male, unknown past medical history (reported stroke and MI by coworkers) presented to [facility] with AMS, Pt was working at [employer] when he was found down by coworkers. EMS called and pt brought to [facility] ED. Intubated, sedated, started on cardene for SBPs in 200s. CT head showed brain stem bleed. Transferred to NSICU for further management.  (30 Sep 2024 12:55)      S/Overnight events: BD 57, GEORGE overnight. Neuro stable.     Hospital Course:   : transferred from [facility]. A line placed. Versed dc'd. Timate Prasanth at bedside, states pt has family in [place], cannot confirm medications or PMH other than stroke and MI. 250cc bolus 3% given. LR switched to NS. hydralazine 25q8 started, 3% started, switched propofol to precedex   10/1: stability CTH done. Added labetalol, started TF. Palliative consulted. ethics consulted to determine surrogate. febrile 103, pan cx sent  10/2: BD 2, GEORGE overnight. TF resumed. Desatt'd to 80s, FiO2 inc. to 50. Fentanyl given, ABG, CXR ordered. Maxxed on precedex, started on propofol for DARIEN -4 - -5. Precedex dc'd. Duonebs, mucomyst, hypertonic added. 3% dc'd. Cardene dc'd. Start vanc/CTX. Increased labetalol 200q8. MRSA negative, dc'd vanc. ETT pulled back 2cm x 2, good positioning after confirmatory chest xray. Ethics attempting to establish HCP with family. Na 159, starting FW 250q6 for range 150-155.   10/3: BD3, GEORGE o/n, neuro stable. Na elevating, FW increased to 300q6. Dc'd bowel reg for diarrhea. vEEG started. SQH 5000q8 tonight.   10/4: BD 4, albumn bolus, incr. LR to 80 2/2 incr. in Cr, LR to 100cc/hr for uptrending Cr. Started 7% hypersal for 48hrs and SL atropine for inline/oral thick secretions. Dc'd CTX and started ancef for MSSA in the sputum. Nephrology consulted for CKD, f/u recs. SBP 170s, given hydralazine 10mg IVP.   10/5: BD5, o/n 10mg IVP hydralazine given for SBP 170s and started on hydralazine 25q8 via OGT. 10mg IV push labetalol for SBP > 160s. RT placed for diarrhea.   10/6: BD6, o/n FW increased to 350q4 per nephrology recs. IV tylenol for temp 100.6, SBp 160s presumed uncomfortable.   10/7: BD7, overnight pancultured for temp 101.8F.   10/8: BD8. GEORGE. Cr bumped. decreased LR to 75cc/hr. Adding simethicone ATC. incr hydralazine 50mgTID. Incr labetalol 300mgTID. Na 145, decreased FWF to 250q6. Start precedex. FENa consistent with intrinsic kidney injury. Pend repeat renal US. Retaining up to 1.3L, bladder scans q6, straight cath PRN  10/9: BD 9. GEORGE overnight. Neuro stable. abd xray for distention w non-specific gas pattern, OGT to LIWS for morning. duonebs/mucomyst to q8 for improving secretions. Changed tube feeds to Jevity 1.5 20cc/hr, low rate due to abdominal distention, nepro dense and more difficult to digest. Tolerating CPAP, confirmed by ABG.   10/10: BD 10. GEORGE overnight. Neuro stable. (+) gabriel for urinary retention on bladder scan. inc TF to goal rate of 40cc/hr. family leaning toward pursuing trach/PEG. 1/2 amp for FS 81.   10/11: BD 11. GEORGE overnight. Neuro stable. Trach/PEG consults placed.   10/12: BD 12. GEORGE overnight. Neuro stable. MRI brain complete.   10/13: BD 13. Increase flomax. Hold SQH after PM dose for trach tm. IVL.   10/14: BD 14. GEORGE overnight, remains on AC/VC. Gabriel placed for urinary retention. Dc'd free water.  S/p trach with pulm. NGT placed and CXR confirmed in good position.   10/15: BD 15, GEORGE ovn. resumed feeds. spiked 101, pan cx sent.   10/16: BD 16. GEORGE ovn. Lokelma 5mg for K+ 5.4. Started vanc q 24/zosyn for empiric PNA coverage, IVF to 100/hr. PEG held for fever.   10/17: BD 17,  ordered serum osm and urine osm for am. Started sinemet for neurostimulation. Increased cardura to 0.8. Started FW 100q4, dc'd IVF. MRSA negative, dc'd vanc. NGT replaced d/t coiling.   10/18: BD 18, GEORGE overnight, neuro stable. Amantadine added for neurostim. zosyn changed to unasyn for acinetobacter baumannii, failed TOV and required SC  10/19: BD 19, GEORGE ovn. cardura 2mg added for retention. labetalol decreased 200q8, hydralazine decreased 25q8. Gabriel replaced.   10/20: BD20, GEORGE overnight. NGT dislodged, replaced. PEG tomorrow w/ gen surg, FW increased to 150q4 and labetalol decreased to 100q8, lokelma given for hyperkalemia.   10/21: BD 21. POD0 PEG placement with Gen surg. decr labetolol to 50q8, incr. cardura to 0.4, started lokelma and phoslo, dc gabriel POD0 PEG placement with Gen surg.  10/22: BD 22. Plan to start TF today via PEG. dc labetalol, Following ophtho recs. Increased apnea settings - found to be in cheyne-moe respiration. CPAP .  10/23: BD 23. hydralazine d/c'd, trach collar trial today. Rectal tube placed at 6am.  10/24: BD24, o/n lokelma held due to diarrhea. Free water 100q6 resumed. dc'd tamsulosin, amantadine. Incr'd cardura to 8mg qhs. Dc'd FW. Switched jevity to nepro. gabriel placed for high urine output. Started SL atropine for oral secretions. Dc'd free water.  10/25: BD25, o/n decreased suctioning requirements to > q4hrs, GEORGE. Cr improving, cont phoslo, lokelma held at this time. Gabriel placed yest, cont. Tolerating trach collar. Given 500cc plasmalyte bolus for ANIKA. Dc'd sinemet.   10/26: BD26, o/n resumed lokelma 5mg daily and resumed 100cc free water q6hrs. Change in neuro status with new right pupillary dilation with anisocoria (right pupil 6mm fixed and left pupil 3mm briskly reactive). Given 23.4% NaCl bullet, taken for emergent CTH showing mostly resolved pontine hemorrhage, continued brainstem hypodensity likely edema d/t hemorrhage, no new hemorrhage or infarct, no herniation, mild increase in size of left lateral ventricle. Vitals remaine stable. Na goal > 140.   10/27: BD27, o/n GEORGE.Neuro stable. Pend stepdown with airway bed.   10/28: BD 28. GEORGE overnight. Neuro stable. Miralax ordered. Gabriel removed, pending TOV.  10/29: BD 29. GEORGE o/n. Given 2L NS over 8 hrs for increased BUN/Cr ratio. Gabriel placed for frequent straight cath.   10/30: BD 30.   10/31: BD 31. GEORGE overnight. Na 149, increased free water to 200q6. 1L NS for uptrending BUN.   : BD 32. GEORGE overnight. Given 1L NS for dehydration. Na 146, increased FW to 250q6.   : BD 33 GEORGE overnight, neuro stable, given 500cc bolus for net negative status and tachycardia   11/3: BD 34, GEORGE overnight, neuro stable. Patient remains tachycardic, EKG showing sinus tachycardia, given additional 500cc NS bolus. Febrile to 101.9F, pan cultured (without UA), CXR WNL, given tylenol.   : BD 35, GEORGE overnight, neuro stable. Given 1L NS for tachycardia. sputum (+) for stenotropohomas maltophilia.   : BD 36 GEORGE overnight, neuro stable. Vancomycin dc'd. Chest PT BID. ID consulted, cont zosyn.  : BD 37. blood cx + klebsiella dc zosyn changed to cefepime, CTAP ordered, rpt blood cx sent.    : BD 38. Pending CT A/P, given 250cc bolus and starting maintenance fluids overnight. Pending CT A/P after bolus   : BD 39. CT CAP negative for infection.   : BD 40. GEORGE overnight.  11/10: BD 41. GEORGE overnight. desat to 85 on trach collar, O2 inc to 10L and 100%, O2 sat inc to 95. pt tachy to 110s, euvolemic. given tylenol. ABG and CXR ordered. spiked fever, pancultured, RVP negative. AM ABG w pO2 79, rpt w pO2 79. pt appears comfortable, satting 94%.   : BD 42. GEORGE overnight. pt became tachy to 130s, desat to 90 on 100% FiO2 and 10L. suctioned, (+) productive cough. temp 101.4, given 1g IV tylenol and 500cc NS bolus for euvolemia. fever and HR downtrending. LE dopplers negative for dvt  : BD 43, GEORGE ovn, fever and HR downtrending, satting 97% 70% FIO2  : BD 44, GEORGE ovn. started standing tylenol x24 hours for tachycardia. desat to 80s, o2 increased. CXR stable, pending CTA PE protocol.   : BD 45, GEORGE overnight, neuro stable. resp therapy dec FiO2 to 70%.   11/15: BD 46, GEORGE overnight, neuro stable.  Rapid called for desaturation 30s, tachycardic 140s. Patient bagged, 100% fio2, heavily suctioned. CXR/POCUS unremarkable. ABG c/w desaturation. WBC 14.71. Afebrile. O2 improved to 90s and patient upgraded to ICU. ABG paO2 30s improved to 89 on vent. IV Tylenol x 1, sputum sent. Start protonix while o-n vent.   : BD 47. POCUS showed collapsable IVCF, given 1L bolus. Vanco/Cefpime added empriic for PNA, NGT feeds restarted. MRSA swab neg, Vanc DC'd.   : BD 48. GEORGE overnight. 1l bolus for tachycardia. Spiked to 101, cultured. 500cc bolus for tachycardia, tachy to 148 given 25mcg fentanyl, 250cc albumin, 1.5L bolus. 5 IV lopressor with response HR to 100s. +Stenotrophomonas on sputum cx.   : BD 49. GEORGE overnight. Consulted ID, cefepime switched to bactrim x7days. Started hydrochlorothiazine 12.5mg daily.  : BD 50. Tachy 120s, given tylenol and 500cc NS. Tolerating 5/5, switched to TCx. Holding phos binder. D/c Bactrim. D/c gabriel, f/u TOV. Dc'd PPI.   : BD 51. GEORGE ovn. 1600 satting low 90s, mildly tachy to 110s, afebrile, RR wnl. O2 improved to mid 90s while inc O2 to 100% on TCx. CPAP 5/5 placed back on.  : BD 52, GEORGE ovn, tolerating CPAP 5/5. Switch to trach collar during the day if tolerating well. HCTZ held for Cr bump, straight cath frequence increased to q4  : BD 53, GEORGE ovn. Resumed phoslo. Gabriel placed. Resumed HCTZ.   : BD 54. Holding tylenol in setting of possible fever, will require pan cx if febrile. Cr improved today. Cont CPAP. Bowel regimen held i/s/o diarrhea. FOBT negative.  : BD 55. GEORGE overnight. Neuro stable. HCTZ dc'ed, started lisinopril 5. Lokelma dc'ed for K 3.7.   : BD 56, GEORGE overnight. Neuro stable. dc'd lisinopril 5mg. Gabriel dc'd. TOV. 1545 noted to be hypotensive, MAP 50, in supine position on chair, HR 60s, afebrile, O2 96%. Given 1L cc NS bolus, placed back on bed in reverse trendelenberg, improved to map of 66. Neostick at bedside. Vitals check q1h. Dc'ed amlodipine. Failed TOV, bladder scan q6, sc prn. Added back Senna.   : BD 57, GEORGE overnight. Neuro stable.     Vital Signs Last 24 Hrs  T(C): 37.4 (2024 22:36), Max: 37.4 (2024 22:36)  T(F): 99.4 (2024 22:36), Max: 99.4 (2024 22:36)  HR: 79 (2024 00:00) (65 - 89)  BP: 107/69 (2024 00:00) (71/44 - 127/82)  BP(mean): 81 (2024 00:00) (53 - 99)  RR: 17 (:00) (15 - 20)  SpO2: 100% (:00) (95% - 100%)    Parameters below as of :00  Patient On (Oxygen Delivery Method): tracheostomy collar  O2 Flow (L/min): 10  O2 Concentration (%): 40    Mode: standby    I&O's Detail    2024 07:01  -  2024 07:00  --------------------------------------------------------  IN:    Enteral Tube Flush: 370 mL    Free Water: 750 mL    Miscellaneous Tube Feedin mL  Total IN: 2080 mL    OUT:    Indwelling Catheter - Urethral (mL): 1925 mL  Total OUT: 1925 mL    Total NET: 155 mL      2024 07:01  -  2024 00:20  --------------------------------------------------------  IN:    Free Water: 500 mL    Miscellaneous Tube Feedin mL    Sodium Chloride 0.9% Bolus: 1000 mL  Total IN: 2460 mL    OUT:    Indwelling Catheter - Urethral (mL): 275 mL    Intermittent Catheterization - Urethral (mL): 350 mL  Total OUT: 625 mL    Total NET: 1835 mL    Physical Exam  Constitutional: Lying in bed, in NAD  Respiratory: +Trach collar, breathing non-labored, symmetrical chest wall movement  Cardiovascuar: RRR, no murmurs  Gastrointestinal: +PEG, abdomen soft, non tender  Genitourinary: exam deferred  Neurological:   Opens eyes spontaneously, intermittently following commands. Awake and alert but not oriented.  Cranial Nerves: PERRL 3mm brisk, tracks vertically, facial movements appear symmetric, tongue midline. No verbal output.   Motor: LUE/LLE withdraws to noxious, RUE trace movement in fingers spontaneously, RLE wiggles toes to command.   Sensation: intact to light touch in all extremities        LABS:                        10.3   6.91  )-----------( 234      ( 2024 04:39 )             33.2         137  |  103  |  73[H]  ----------------------------<  135[H]  3.6   |  21[L]  |  1.65[H]    Ca    9.2      2024 04:39  Phos  3.8       Mg     2.4         TPro  7.4  /  Alb  3.7  /  TBili  0.4  /  DBili  x   /  AST  19  /  ALT  33  /  AlkPhos  136[H]  11-24      Urinalysis Basic - ( 2024 04:39 )    Color: x / Appearance: x / SG: x / pH: x  Gluc: 135 mg/dL / Ketone: x  / Bili: x / Urobili: x   Blood: x / Protein: x / Nitrite: x   Leuk Esterase: x / RBC: x / WBC x   Sq Epi: x / Non Sq Epi: x / Bacteria: x          CAPILLARY BLOOD GLUCOSE      POCT Blood Glucose.: 111 mg/dL (2024 15:52)  POCT Blood Glucose.: 134 mg/dL (2024 05:30)      Drug Levels: [] N/A    CSF Analysis: [] N/A      Allergies    Allergy Status Unknown    Intolerances      MEDICATIONS:  Antibiotics:    Neuro:  acetaminophen   Oral Liquid .. 650 milliGRAM(s) Oral every 6 hours PRN    Anticoagulation:  heparin   Injectable 5000 Unit(s) SubCutaneous every 8 hours    OTHER:  acetylcysteine 10%  Inhalation 4 milliLiter(s) Inhalation every 8 hours  albuterol/ipratropium for Nebulization 3 milliLiter(s) Nebulizer every 8 hours  artificial tears (preservative free) Ophthalmic Solution 1 Drop(s) Right EYE every 4 hours  chlorhexidine 0.12% Liquid 15 milliLiter(s) Oral Mucosa every 12 hours  chlorhexidine 2% Cloths 1 Application(s) Topical <User Schedule>  doxazosin 8 milliGRAM(s) Oral at bedtime  petrolatum Ophthalmic Ointment 1 Application(s) Both EYES two times a day  senna 2 Tablet(s) Oral at bedtime    IVF:  calcium acetate 667 milliGRAM(s) Oral every 12 hours    CULTURES:  Culture Results:   No growth at 5 days ( @ 05:59)  Culture Results:   Mixed gram negative rods including  Moderate Stenotrophomonas maltophilia  Commensal iram consistent with body site ( @ 01:53)      Assessment            NEUROCRITICAL CARE PROGRESS NOTE    GARETT DELEON   MRN-[id number]    HPI:  Unknown age male, unknown past medical history (reported stroke and MI by coworkers) presented to [facility] with AMS, Pt was working at [employer] when he was found down by coworkers. EMS called and pt brought to [facility] ED. Intubated, sedated, started on cardene for SBPs in 200s. CT head showed brain stem bleed. Transferred to NSICU for further management.  (30 Sep 2024 12:55)      S/Overnight events: BD 57, GEORGE overnight. Neuro stable.     Hospital Course:   : transferred from [facility]. A line placed. Versed dc'd. Timate Prasanth at bedside, states pt has family in [place], cannot confirm medications or PMH other than stroke and MI. 250cc bolus 3% given. LR switched to NS. hydralazine 25q8 started, 3% started, switched propofol to precedex   10/1: stability CTH done. Added labetalol, started TF. Palliative consulted. ethics consulted to determine surrogate. febrile 103, pan cx sent  10/2: BD 2, GEORGE overnight. TF resumed. Desatt'd to 80s, FiO2 inc. to 50. Fentanyl given, ABG, CXR ordered. Maxxed on precedex, started on propofol for DARIEN -4 - -5. Precedex dc'd. Duonebs, mucomyst, hypertonic added. 3% dc'd. Cardene dc'd. Start vanc/CTX. Increased labetalol 200q8. MRSA negative, dc'd vanc. ETT pulled back 2cm x 2, good positioning after confirmatory chest xray. Ethics attempting to establish HCP with family. Na 159, starting FW 250q6 for range 150-155.   10/3: BD3, GEORGE o/n, neuro stable. Na elevating, FW increased to 300q6. Dc'd bowel reg for diarrhea. vEEG started. SQH 5000q8 tonight.   10/4: BD 4, albumn bolus, incr. LR to 80 2/2 incr. in Cr, LR to 100cc/hr for uptrending Cr. Started 7% hypersal for 48hrs and SL atropine for inline/oral thick secretions. Dc'd CTX and started ancef for MSSA in the sputum. Nephrology consulted for CKD, f/u recs. SBP 170s, given hydralazine 10mg IVP.   10/5: BD5, o/n 10mg IVP hydralazine given for SBP 170s and started on hydralazine 25q8 via OGT. 10mg IV push labetalol for SBP > 160s. RT placed for diarrhea.   10/6: BD6, o/n FW increased to 350q4 per nephrology recs. IV tylenol for temp 100.6, SBp 160s presumed uncomfortable.   10/7: BD7, overnight pancultured for temp 101.8F.   10/8: BD8. GEORGE. Cr bumped. decreased LR to 75cc/hr. Adding simethicone ATC. incr hydralazine 50mgTID. Incr labetalol 300mgTID. Na 145, decreased FWF to 250q6. Start precedex. FENa consistent with intrinsic kidney injury. Pend repeat renal US. Retaining up to 1.3L, bladder scans q6, straight cath PRN  10/9: BD 9. GEORGE overnight. Neuro stable. abd xray for distention w non-specific gas pattern, OGT to LIWS for morning. duonebs/mucomyst to q8 for improving secretions. Changed tube feeds to Jevity 1.5 20cc/hr, low rate due to abdominal distention, nepro dense and more difficult to digest. Tolerating CPAP, confirmed by ABG.   10/10: BD 10. GEORGE overnight. Neuro stable. (+) gabriel for urinary retention on bladder scan. inc TF to goal rate of 40cc/hr. family leaning toward pursuing trach/PEG. 1/2 amp for FS 81.   10/11: BD 11. GEORGE overnight. Neuro stable. Trach/PEG consults placed.   10/12: BD 12. GEORGE overnight. Neuro stable. MRI brain complete.   10/13: BD 13. Increase flomax. Hold SQH after PM dose for trach tm. IVL.   10/14: BD 14. GEORGE overnight, remains on AC/VC. Gabriel placed for urinary retention. Dc'd free water.  S/p trach with pulm. NGT placed and CXR confirmed in good position.   10/15: BD 15, GEORGE ovn. resumed feeds. spiked 101, pan cx sent.   10/16: BD 16. GEORGE ovn. Lokelma 5mg for K+ 5.4. Started vanc q 24/zosyn for empiric PNA coverage, IVF to 100/hr. PEG held for fever.   10/17: BD 17,  ordered serum osm and urine osm for am. Started sinemet for neurostimulation. Increased cardura to 0.8. Started FW 100q4, dc'd IVF. MRSA negative, dc'd vanc. NGT replaced d/t coiling.   10/18: BD 18, GEORGE overnight, neuro stable. Amantadine added for neurostim. zosyn changed to unasyn for acinetobacter baumannii, failed TOV and required SC  10/19: BD 19, GEORGE ovn. cardura 2mg added for retention. labetalol decreased 200q8, hydralazine decreased 25q8. Gabriel replaced.   10/20: BD20, GEORGE overnight. NGT dislodged, replaced. PEG tomorrow w/ gen surg, FW increased to 150q4 and labetalol decreased to 100q8, lokelma given for hyperkalemia.   10/21: BD 21. POD0 PEG placement with Gen surg. decr labetolol to 50q8, incr. cardura to 0.4, started lokelma and phoslo, dc gabriel POD0 PEG placement with Gen surg.  10/22: BD 22. Plan to start TF today via PEG. dc labetalol, Following ophtho recs. Increased apnea settings - found to be in cheyne-moe respiration. CPAP .  10/23: BD 23. hydralazine d/c'd, trach collar trial today. Rectal tube placed at 6am.  10/24: BD24, o/n lokelma held due to diarrhea. Free water 100q6 resumed. dc'd tamsulosin, amantadine. Incr'd cardura to 8mg qhs. Dc'd FW. Switched jevity to nepro. gabriel placed for high urine output. Started SL atropine for oral secretions. Dc'd free water.  10/25: BD25, o/n decreased suctioning requirements to > q4hrs, GEORGE. Cr improving, cont phoslo, lokelma held at this time. Gabriel placed yest, cont. Tolerating trach collar. Given 500cc plasmalyte bolus for ANIKA. Dc'd sinemet.   10/26: BD26, o/n resumed lokelma 5mg daily and resumed 100cc free water q6hrs. Change in neuro status with new right pupillary dilation with anisocoria (right pupil 6mm fixed and left pupil 3mm briskly reactive). Given 23.4% NaCl bullet, taken for emergent CTH showing mostly resolved pontine hemorrhage, continued brainstem hypodensity likely edema d/t hemorrhage, no new hemorrhage or infarct, no herniation, mild increase in size of left lateral ventricle. Vitals remaine stable. Na goal > 140.   10/27: BD27, o/n GEORGE.Neuro stable. Pend stepdown with airway bed.   10/28: BD 28. GEORGE overnight. Neuro stable. Miralax ordered. Gabriel removed, pending TOV.  10/29: BD 29. GEORGE o/n. Given 2L NS over 8 hrs for increased BUN/Cr ratio. Gabriel placed for frequent straight cath.   10/30: BD 30.   10/31: BD 31. GEORGE overnight. Na 149, increased free water to 200q6. 1L NS for uptrending BUN.   : BD 32. GEORGE overnight. Given 1L NS for dehydration. Na 146, increased FW to 250q6.   : BD 33 GEORGE overnight, neuro stable, given 500cc bolus for net negative status and tachycardia   11/3: BD 34, GEORGE overnight, neuro stable. Patient remains tachycardic, EKG showing sinus tachycardia, given additional 500cc NS bolus. Febrile to 101.9F, pan cultured (without UA), CXR WNL, given tylenol.   : BD 35, GEORGE overnight, neuro stable. Given 1L NS for tachycardia. sputum (+) for stenotropohomas maltophilia.   : BD 36 GEORGE overnight, neuro stable. Vancomycin dc'd. Chest PT BID. ID consulted, cont zosyn.  : BD 37. blood cx + klebsiella dc zosyn changed to cefepime, CTAP ordered, rpt blood cx sent.    : BD 38. Pending CT A/P, given 250cc bolus and starting maintenance fluids overnight. Pending CT A/P after bolus   : BD 39. CT CAP negative for infection.   : BD 40. GEORGE overnight.  11/10: BD 41. GEORGE overnight. desat to 85 on trach collar, O2 inc to 10L and 100%, O2 sat inc to 95. pt tachy to 110s, euvolemic. given tylenol. ABG and CXR ordered. spiked fever, pancultured, RVP negative. AM ABG w pO2 79, rpt w pO2 79. pt appears comfortable, satting 94%.   : BD 42. GEORGE overnight. pt became tachy to 130s, desat to 90 on 100% FiO2 and 10L. suctioned, (+) productive cough. temp 101.4, given 1g IV tylenol and 500cc NS bolus for euvolemia. fever and HR downtrending. LE dopplers negative for dvt  : BD 43, GEORGE ovn, fever and HR downtrending, satting 97% 70% FIO2  : BD 44, GEORGE ovn. started standing tylenol x24 hours for tachycardia. desat to 80s, o2 increased. CXR stable, pending CTA PE protocol.   : BD 45, GEORGE overnight, neuro stable. resp therapy dec FiO2 to 70%.   11/15: BD 46, GEORGE overnight, neuro stable.  Rapid called for desaturation 30s, tachycardic 140s. Patient bagged, 100% fio2, heavily suctioned. CXR/POCUS unremarkable. ABG c/w desaturation. WBC 14.71. Afebrile. O2 improved to 90s and patient upgraded to ICU. ABG paO2 30s improved to 89 on vent. IV Tylenol x 1, sputum sent. Start protonix while o-n vent.   : BD 47. POCUS showed collapsable IVCF, given 1L bolus. Vanco/Cefpime added empriic for PNA, NGT feeds restarted. MRSA swab neg, Vanc DC'd.   : BD 48. GEORGE overnight. 1l bolus for tachycardia. Spiked to 101, cultured. 500cc bolus for tachycardia, tachy to 148 given 25mcg fentanyl, 250cc albumin, 1.5L bolus. 5 IV lopressor with response HR to 100s. +Stenotrophomonas on sputum cx.   : BD 49. GEORGE overnight. Consulted ID, cefepime switched to bactrim x7days. Started hydrochlorothiazine 12.5mg daily.  : BD 50. Tachy 120s, given tylenol and 500cc NS. Tolerating 5/5, switched to TCx. Holding phos binder. D/c Bactrim. D/c gabriel, f/u TOV. Dc'd PPI.   : BD 51. GEORGE ovn. 1600 satting low 90s, mildly tachy to 110s, afebrile, RR wnl. O2 improved to mid 90s while inc O2 to 100% on TCx. CPAP 5/5 placed back on.  : BD 52, GEORGE ovn, tolerating CPAP 5/5. Switch to trach collar during the day if tolerating well. HCTZ held for Cr bump, straight cath frequence increased to q4  : BD 53, GEORGE ovn. Resumed phoslo. Gabriel placed. Resumed HCTZ.   : BD 54. Holding tylenol in setting of possible fever, will require pan cx if febrile. Cr improved today. Cont CPAP. Bowel regimen held i/s/o diarrhea. FOBT negative.  : BD 55. GEORGE overnight. Neuro stable. HCTZ dc'ed, started lisinopril 5. Lokelma dc'ed for K 3.7.   : BD 56, GEORGE overnight. Neuro stable. dc'd lisinopril 5mg. Gabriel dc'd. TOV. 1545 noted to be hypotensive, MAP 50, in supine position on chair, HR 60s, afebrile, O2 96%. Given 1L cc NS bolus, placed back on bed in reverse trendelenberg, improved to map of 66. Neostick at bedside. Vitals check q1h. Dc'ed amlodipine. Failed TOV, bladder scan q6, sc prn. Added back Senna.   : BD 57, GEORGE overnight. Neuro stable.     Vital Signs Last 24 Hrs  T(C): 37.4 (2024 22:36), Max: 37.4 (2024 22:36)  T(F): 99.4 (2024 22:36), Max: 99.4 (2024 22:36)  HR: 79 (2024 00:00) (65 - 89)  BP: 107/69 (2024 00:00) (71/44 - 127/82)  BP(mean): 81 (2024 00:00) (53 - 99)  RR: 17 (:00) (15 - 20)  SpO2: 100% (:00) (95% - 100%)    Parameters below as of :00  Patient On (Oxygen Delivery Method): tracheostomy collar  O2 Flow (L/min): 10  O2 Concentration (%): 40    Mode: standby    I&O's Detail    2024 07:01  -  2024 07:00  --------------------------------------------------------  IN:    Enteral Tube Flush: 370 mL    Free Water: 750 mL    Miscellaneous Tube Feedin mL  Total IN: 2080 mL    OUT:    Indwelling Catheter - Urethral (mL): 1925 mL  Total OUT: 1925 mL    Total NET: 155 mL      2024 07:01  -  2024 00:20  --------------------------------------------------------  IN:    Free Water: 500 mL    Miscellaneous Tube Feedin mL    Sodium Chloride 0.9% Bolus: 1000 mL  Total IN: 2460 mL    OUT:    Indwelling Catheter - Urethral (mL): 275 mL    Intermittent Catheterization - Urethral (mL): 350 mL  Total OUT: 625 mL    Total NET: 1835 mL    Physical Exam  Constitutional: Lying in bed, in NAD  Respiratory: +Trach collar, breathing non-labored, symmetrical chest wall movement  Cardiovascuar: RRR, no murmurs  Gastrointestinal: +PEG, abdomen soft, non tender  Genitourinary: exam deferred  Neurological:   Opens eyes spontaneously, intermittently following commands. Awake and alert but not oriented.  Cranial Nerves: PERRL 3mm brisk, tracks vertically, facial movements appear symmetric, tongue midline. No verbal output.   Motor: LUE/LLE withdraws to noxious, RUE trace movement in fingers spontaneously, RLE wiggles toes to command.   Sensation: intact to light touch in all extremities        LABS:                        10.3   6.91  )-----------( 234      ( 2024 04:39 )             33.2         137  |  103  |  73[H]  ----------------------------<  135[H]  3.6   |  21[L]  |  1.65[H]    Ca    9.2      2024 04:39  Phos  3.8       Mg     2.4         TPro  7.4  /  Alb  3.7  /  TBili  0.4  /  DBili  x   /  AST  19  /  ALT  33  /  AlkPhos  136[H]  11-24      Urinalysis Basic - ( 2024 04:39 )    Color: x / Appearance: x / SG: x / pH: x  Gluc: 135 mg/dL / Ketone: x  / Bili: x / Urobili: x   Blood: x / Protein: x / Nitrite: x   Leuk Esterase: x / RBC: x / WBC x   Sq Epi: x / Non Sq Epi: x / Bacteria: x          CAPILLARY BLOOD GLUCOSE      POCT Blood Glucose.: 111 mg/dL (2024 15:52)  POCT Blood Glucose.: 134 mg/dL (2024 05:30)      Drug Levels: [] N/A    CSF Analysis: [] N/A      Allergies    Allergy Status Unknown    Intolerances      MEDICATIONS:  Antibiotics:    Neuro:  acetaminophen   Oral Liquid .. 650 milliGRAM(s) Oral every 6 hours PRN    Anticoagulation:  heparin   Injectable 5000 Unit(s) SubCutaneous every 8 hours    OTHER:  acetylcysteine 10%  Inhalation 4 milliLiter(s) Inhalation every 8 hours  albuterol/ipratropium for Nebulization 3 milliLiter(s) Nebulizer every 8 hours  artificial tears (preservative free) Ophthalmic Solution 1 Drop(s) Right EYE every 4 hours  chlorhexidine 0.12% Liquid 15 milliLiter(s) Oral Mucosa every 12 hours  chlorhexidine 2% Cloths 1 Application(s) Topical <User Schedule>  doxazosin 8 milliGRAM(s) Oral at bedtime  petrolatum Ophthalmic Ointment 1 Application(s) Both EYES two times a day  senna 2 Tablet(s) Oral at bedtime    IVF:  calcium acetate 667 milliGRAM(s) Oral every 12 hours    CULTURES:  Culture Results:   No growth at 5 days ( @ 05:59)  Culture Results:   Mixed gram negative rods including  Moderate Stenotrophomonas maltophilia  Commensal iram consistent with body site ( @ 01:53)      Assessment   43 y/o PMH ?stroke/MI present to [facility] after collapsing at work. Decorticate posturing, vomiting, intubated for airway protection. 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 Transferred to [facility] for further management. s/p trach 10/14. s/p peg 10/21. Re-upgrade to ICU 2/2 desaturation event and suctioning requirements 11/15.     PLAN:  Neuro:  - neuro q4/vitals q1  - pain control: tylenol held i/s/o low grade fever  - vEEG (10/3-)- negative, (10/17-10/19) - negative  - CTH : enlarged pontine hemorrhage, CTH 10/3: stable, CTH 10/25: mostly resolved pontine hemorrhage, no new hemorrhage or infarct, no herniation  - MRI brain 10/12: parenchymal hemorrhage, acute/subacute R cerebellar stroke    - stroke core measures, stroke neuro signed off    CV:  - -160  - HTN: holding amlodipine 10 mg, lisinopril 5 mg   - echo () EF 75%    Resp:  - trach collar w/ cpap  overnight  - Secretions: duonebs/mucomyst/chest PT q8h   - CTA chest  negative for PE, inc ground glass opacities    GI:  - TF via PEG (placed 10/21 by gen surg)  - bowel regimen, last BM    - CTAP  negative for infection    - FOBT negative     Renal:  - IVL  - FW flushes 250cc q6hrs for uptrending BUN/Cr  - hyperK: held lokelma 5mg qd  - Hyperphos: added phoslo BID  - Urinary retenion: bladder scan q6, sc PRN  - CKD: trend BUN/Cr  - renal US 10/1: echogenicity c/w chronic med renal dz, repeat 10/8: inc renal echogeicity, c/f medical renal disease w increased hydro     Endo:  - A1c 5.4    Heme:  - DVT ppx: SCDs, SQH 5000u q8h   - LE dopplers negative     ID:  - febrile, last pancx , MRSA swab neg    -  s/p Stenotrophomonas maltophilia PNA: s/p Bactrim (-) s/p Cefepime (-)  - 11/3, (+) sputum for stenotrophomas maltophlia, blood cx (+) klebsiella, cefepime 2gq12 ( - )   - empiric tx: s/p zosyn (11/3-) , s/p vanc  (11/3- )  - S/p Ancef (10/4-10/14) for PNA, and s/p Unasyn (10/18-10/23) +actinobacter baumanii    MISC:  - ophtho consult for keratitis, s/p erythromycin ointment to rt eye q4hrs, ofloxacin ointment to rt eye QID, artificial tears to rt eye q2hrs, moisture chamber at bedtime    Dispo: NSICU status, full code, pending placement and emergency medicaid     D/w Dr. D'Amico and Abdirizak              NEUROCRITICAL CARE PROGRESS NOTE    GARETT DELEON   MRN-[id number]    HPI:  Unknown age male, unknown past medical history (reported stroke and MI by coworkers) presented to [facility] with AMS, Pt was working at [employer] when he was found down by coworkers. EMS called and pt brought to [facility] ED. Intubated, sedated, started on cardene for SBPs in 200s. CT head showed brain stem bleed. Transferred to NSICU for further management.  (30 Sep 2024 12:55)      S/Overnight events: BD 57, GEORGE overnight. Neuro stable.     Hospital Course:   : transferred from [facility]. A line placed. Versed dc'd. Timate Prasanth at bedside, states pt has family in [place], cannot confirm medications or PMH other than stroke and MI. 250cc bolus 3% given. LR switched to NS. hydralazine 25q8 started, 3% started, switched propofol to precedex   10/1: stability CTH done. Added labetalol, started TF. Palliative consulted. ethics consulted to determine surrogate. febrile 103, pan cx sent  10/2: BD 2, GEORGE overnight. TF resumed. Desatt'd to 80s, FiO2 inc. to 50. Fentanyl given, ABG, CXR ordered. Maxxed on precedex, started on propofol for DARIEN -4 - -5. Precedex dc'd. Duonebs, mucomyst, hypertonic added. 3% dc'd. Cardene dc'd. Start vanc/CTX. Increased labetalol 200q8. MRSA negative, dc'd vanc. ETT pulled back 2cm x 2, good positioning after confirmatory chest xray. Ethics attempting to establish HCP with family. Na 159, starting FW 250q6 for range 150-155.   10/3: BD3, GEORGE o/n, neuro stable. Na elevating, FW increased to 300q6. Dc'd bowel reg for diarrhea. vEEG started. SQH 5000q8 tonight.   10/4: BD 4, albumn bolus, incr. LR to 80 2/2 incr. in Cr, LR to 100cc/hr for uptrending Cr. Started 7% hypersal for 48hrs and SL atropine for inline/oral thick secretions. Dc'd CTX and started ancef for MSSA in the sputum. Nephrology consulted for CKD, f/u recs. SBP 170s, given hydralazine 10mg IVP.   10/5: BD5, o/n 10mg IVP hydralazine given for SBP 170s and started on hydralazine 25q8 via OGT. 10mg IV push labetalol for SBP > 160s. RT placed for diarrhea.   10/6: BD6, o/n FW increased to 350q4 per nephrology recs. IV tylenol for temp 100.6, SBp 160s presumed uncomfortable.   10/7: BD7, overnight pancultured for temp 101.8F.   10/8: BD8. GEORGE. Cr bumped. decreased LR to 75cc/hr. Adding simethicone ATC. incr hydralazine 50mgTID. Incr labetalol 300mgTID. Na 145, decreased FWF to 250q6. Start precedex. FENa consistent with intrinsic kidney injury. Pend repeat renal US. Retaining up to 1.3L, bladder scans q6, straight cath PRN  10/9: BD 9. GEORGE overnight. Neuro stable. abd xray for distention w non-specific gas pattern, OGT to LIWS for morning. duonebs/mucomyst to q8 for improving secretions. Changed tube feeds to Jevity 1.5 20cc/hr, low rate due to abdominal distention, nepro dense and more difficult to digest. Tolerating CPAP, confirmed by ABG.   10/10: BD 10. GEORGE overnight. Neuro stable. (+) gabriel for urinary retention on bladder scan. inc TF to goal rate of 40cc/hr. family leaning toward pursuing trach/PEG. 1/2 amp for FS 81.   10/11: BD 11. GEORGE overnight. Neuro stable. Trach/PEG consults placed.   10/12: BD 12. GEORGE overnight. Neuro stable. MRI brain complete.   10/13: BD 13. Increase flomax. Hold SQH after PM dose for trach tm. IVL.   10/14: BD 14. GEORGE overnight, remains on AC/VC. Gabriel placed for urinary retention. Dc'd free water.  S/p trach with pulm. NGT placed and CXR confirmed in good position.   10/15: BD 15, GEORGE ovn. resumed feeds. spiked 101, pan cx sent.   10/16: BD 16. GEORGE ovn. Lokelma 5mg for K+ 5.4. Started vanc q 24/zosyn for empiric PNA coverage, IVF to 100/hr. PEG held for fever.   10/17: BD 17,  ordered serum osm and urine osm for am. Started sinemet for neurostimulation. Increased cardura to 0.8. Started FW 100q4, dc'd IVF. MRSA negative, dc'd vanc. NGT replaced d/t coiling.   10/18: BD 18, GEORGE overnight, neuro stable. Amantadine added for neurostim. zosyn changed to unasyn for acinetobacter baumannii, failed TOV and required SC  10/19: BD 19, GEORGE ovn. cardura 2mg added for retention. labetalol decreased 200q8, hydralazine decreased 25q8. Gabriel replaced.   10/20: BD20, GEORGE overnight. NGT dislodged, replaced. PEG tomorrow w/ gen surg, FW increased to 150q4 and labetalol decreased to 100q8, lokelma given for hyperkalemia.   10/21: BD 21. POD0 PEG placement with Gen surg. decr labetolol to 50q8, incr. cardura to 0.4, started lokelma and phoslo, dc gabriel POD0 PEG placement with Gen surg.  10/22: BD 22. Plan to start TF today via PEG. dc labetalol, Following ophtho recs. Increased apnea settings - found to be in cheyne-moe respiration. CPAP .  10/23: BD 23. hydralazine d/c'd, trach collar trial today. Rectal tube placed at 6am.  10/24: BD24, o/n lokelma held due to diarrhea. Free water 100q6 resumed. dc'd tamsulosin, amantadine. Incr'd cardura to 8mg qhs. Dc'd FW. Switched jevity to nepro. gabriel placed for high urine output. Started SL atropine for oral secretions. Dc'd free water.  10/25: BD25, o/n decreased suctioning requirements to > q4hrs, GEORGE. Cr improving, cont phoslo, lokelma held at this time. Gabriel placed yest, cont. Tolerating trach collar. Given 500cc plasmalyte bolus for ANIKA. Dc'd sinemet.   10/26: BD26, o/n resumed lokelma 5mg daily and resumed 100cc free water q6hrs. Change in neuro status with new right pupillary dilation with anisocoria (right pupil 6mm fixed and left pupil 3mm briskly reactive). Given 23.4% NaCl bullet, taken for emergent CTH showing mostly resolved pontine hemorrhage, continued brainstem hypodensity likely edema d/t hemorrhage, no new hemorrhage or infarct, no herniation, mild increase in size of left lateral ventricle. Vitals remaine stable. Na goal > 140.   10/27: BD27, o/n GEORGE.Neuro stable. Pend stepdown with airway bed.   10/28: BD 28. GEORGE overnight. Neuro stable. Miralax ordered. Gabriel removed, pending TOV.  10/29: BD 29. GEORGE o/n. Given 2L NS over 8 hrs for increased BUN/Cr ratio. Gabriel placed for frequent straight cath.   10/30: BD 30.   10/31: BD 31. GEORGE overnight. Na 149, increased free water to 200q6. 1L NS for uptrending BUN.   : BD 32. GEORGE overnight. Given 1L NS for dehydration. Na 146, increased FW to 250q6.   : BD 33 GEORGE overnight, neuro stable, given 500cc bolus for net negative status and tachycardia   11/3: BD 34, GEROGE overnight, neuro stable. Patient remains tachycardic, EKG showing sinus tachycardia, given additional 500cc NS bolus. Febrile to 101.9F, pan cultured (without UA), CXR WNL, given tylenol.   : BD 35, GEORGE overnight, neuro stable. Given 1L NS for tachycardia. sputum (+) for stenotropohomas maltophilia.   : BD 36 GEORGE overnight, neuro stable. Vancomycin dc'd. Chest PT BID. ID consulted, cont zosyn.  : BD 37. blood cx + klebsiella dc zosyn changed to cefepime, CTAP ordered, rpt blood cx sent.    : BD 38. Pending CT A/P, given 250cc bolus and starting maintenance fluids overnight. Pending CT A/P after bolus   : BD 39. CT CAP negative for infection.   : BD 40. GEORGE overnight.  11/10: BD 41. GEORGE overnight. desat to 85 on trach collar, O2 inc to 10L and 100%, O2 sat inc to 95. pt tachy to 110s, euvolemic. given tylenol. ABG and CXR ordered. spiked fever, pancultured, RVP negative. AM ABG w pO2 79, rpt w pO2 79. pt appears comfortable, satting 94%.   : BD 42. GEORGE overnight. pt became tachy to 130s, desat to 90 on 100% FiO2 and 10L. suctioned, (+) productive cough. temp 101.4, given 1g IV tylenol and 500cc NS bolus for euvolemia. fever and HR downtrending. LE dopplers negative for dvt  : BD 43, GEORGE ovn, fever and HR downtrending, satting 97% 70% FIO2  : BD 44, GEORGE ovn. started standing tylenol x24 hours for tachycardia. desat to 80s, o2 increased. CXR stable, pending CTA PE protocol.   : BD 45, GEORGE overnight, neuro stable. resp therapy dec FiO2 to 70%.   11/15: BD 46, GEORGE overnight, neuro stable.  Rapid called for desaturation 30s, tachycardic 140s. Patient bagged, 100% fio2, heavily suctioned. CXR/POCUS unremarkable. ABG c/w desaturation. WBC 14.71. Afebrile. O2 improved to 90s and patient upgraded to ICU. ABG paO2 30s improved to 89 on vent. IV Tylenol x 1, sputum sent. Start protonix while o-n vent.   : BD 47. POCUS showed collapsable IVCF, given 1L bolus. Vanco/Cefpime added empriic for PNA, NGT feeds restarted. MRSA swab neg, Vanc DC'd.   : BD 48. GEORGE overnight. 1l bolus for tachycardia. Spiked to 101, cultured. 500cc bolus for tachycardia, tachy to 148 given 25mcg fentanyl, 250cc albumin, 1.5L bolus. 5 IV lopressor with response HR to 100s. +Stenotrophomonas on sputum cx.   : BD 49. GEORGE overnight. Consulted ID, cefepime switched to bactrim x7days. Started hydrochlorothiazine 12.5mg daily.  : BD 50. Tachy 120s, given tylenol and 500cc NS. Tolerating 5/5, switched to TCx. Holding phos binder. D/c Bactrim. D/c gabriel, f/u TOV. Dc'd PPI.   : BD 51. GEORGE ovn. 1600 satting low 90s, mildly tachy to 110s, afebrile, RR wnl. O2 improved to mid 90s while inc O2 to 100% on TCx. CPAP 5/5 placed back on.  : BD 52, GEORGE ovn, tolerating CPAP 5/5. Switch to trach collar during the day if tolerating well. HCTZ held for Cr bump, straight cath frequence increased to q4  : BD 53, GEORGE ovn. Resumed phoslo. Gabriel placed. Resumed HCTZ.   : BD 54. Holding tylenol in setting of possible fever, will require pan cx if febrile. Cr improved today. Cont CPAP. Bowel regimen held i/s/o diarrhea. FOBT negative.  : BD 55. GEORGE overnight. Neuro stable. HCTZ dc'ed, started lisinopril 5. Lokelma dc'ed for K 3.7.   : BD 56, GEORGE overnight. Neuro stable. dc'd lisinopril 5mg. Gabriel dc'd. TOV. 1545 noted to be hypotensive, MAP 50, in supine position on chair, HR 60s, afebrile, O2 96%. Given 1L cc NS bolus, placed back on bed in reverse trendelenberg, improved to map of 66. Neostick at bedside. Vitals check q1h. Dc'ed amlodipine. Failed TOV, bladder scan q6, sc prn. Added back Senna.   : BD 57, GEORGE overnight. Neuro stable.     Vital Signs Last 24 Hrs  T(C): 37.4 (2024 22:36), Max: 37.4 (2024 22:36)  T(F): 99.4 (2024 22:36), Max: 99.4 (2024 22:36)  HR: 79 (2024 00:00) (65 - 89)  BP: 107/69 (2024 00:00) (71/44 - 127/82)  BP(mean): 81 (2024 00:00) (53 - 99)  RR: 17 (:00) (15 - 20)  SpO2: 100% (:00) (95% - 100%)    Parameters below as of :00  Patient On (Oxygen Delivery Method): tracheostomy collar  O2 Flow (L/min): 10  O2 Concentration (%): 40    Mode: standby    I&O's Detail    2024 07:01  -  2024 07:00  --------------------------------------------------------  IN:    Enteral Tube Flush: 370 mL    Free Water: 750 mL    Miscellaneous Tube Feedin mL  Total IN: 2080 mL    OUT:    Indwelling Catheter - Urethral (mL): 1925 mL  Total OUT: 1925 mL    Total NET: 155 mL      2024 07:01  -  2024 00:20  --------------------------------------------------------  IN:    Free Water: 500 mL    Miscellaneous Tube Feedin mL    Sodium Chloride 0.9% Bolus: 1000 mL  Total IN: 2460 mL    OUT:    Indwelling Catheter - Urethral (mL): 275 mL    Intermittent Catheterization - Urethral (mL): 350 mL  Total OUT: 625 mL    Total NET: 1835 mL    Physical Exam  Constitutional: Lying in bed, in NAD  Respiratory: +Trach collar, breathing non-labored, symmetrical chest wall movement  Cardiovascuar: RRR, no murmurs  Gastrointestinal: +PEG, abdomen soft, non tender  Genitourinary: exam deferred  Neurological:   Opens eyes spontaneously, intermittently following commands. Awake and alert but not oriented.  Cranial Nerves: PERRL 3mm brisk, tracks vertically, facial movements appear symmetric, tongue midline. No verbal output.   Motor: LUE/LLE withdraws to noxious, RUE trace movement in fingers spontaneously, RLE wiggles toes to command.   Sensation: intact to light touch in all extremities        LABS:                        10.3   6.91  )-----------( 234      ( 2024 04:39 )             33.2         137  |  103  |  73[H]  ----------------------------<  135[H]  3.6   |  21[L]  |  1.65[H]    Ca    9.2      2024 04:39  Phos  3.8       Mg     2.4         TPro  7.4  /  Alb  3.7  /  TBili  0.4  /  DBili  x   /  AST  19  /  ALT  33  /  AlkPhos  136[H]  11-24      Urinalysis Basic - ( 2024 04:39 )    Color: x / Appearance: x / SG: x / pH: x  Gluc: 135 mg/dL / Ketone: x  / Bili: x / Urobili: x   Blood: x / Protein: x / Nitrite: x   Leuk Esterase: x / RBC: x / WBC x   Sq Epi: x / Non Sq Epi: x / Bacteria: x          CAPILLARY BLOOD GLUCOSE      POCT Blood Glucose.: 111 mg/dL (2024 15:52)  POCT Blood Glucose.: 134 mg/dL (2024 05:30)      Drug Levels: [] N/A    CSF Analysis: [] N/A      Allergies    Allergy Status Unknown    Intolerances      MEDICATIONS:  Antibiotics:    Neuro:  acetaminophen   Oral Liquid .. 650 milliGRAM(s) Oral every 6 hours PRN    Anticoagulation:  heparin   Injectable 5000 Unit(s) SubCutaneous every 8 hours    OTHER:  acetylcysteine 10%  Inhalation 4 milliLiter(s) Inhalation every 8 hours  albuterol/ipratropium for Nebulization 3 milliLiter(s) Nebulizer every 8 hours  artificial tears (preservative free) Ophthalmic Solution 1 Drop(s) Right EYE every 4 hours  chlorhexidine 0.12% Liquid 15 milliLiter(s) Oral Mucosa every 12 hours  chlorhexidine 2% Cloths 1 Application(s) Topical <User Schedule>  doxazosin 8 milliGRAM(s) Oral at bedtime  petrolatum Ophthalmic Ointment 1 Application(s) Both EYES two times a day  senna 2 Tablet(s) Oral at bedtime    IVF:  calcium acetate 667 milliGRAM(s) Oral every 12 hours    CULTURES:  Culture Results:   No growth at 5 days ( @ 05:59)  Culture Results:   Mixed gram negative rods including  Moderate Stenotrophomonas maltophilia  Commensal iram consistent with body site ( @ 01:53)      Assessment   43 y/o PMH ?stroke/MI present to [facility] after collapsing at work. Decorticate posturing, vomiting, intubated for airway protection. 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 Transferred to [facility] for further management. s/p trach 10/14. s/p peg 10/21. Re-upgrade to ICU 2/2 desaturation event and suctioning requirements 11/15.     PLAN:  Neuro:  - neuro q4/vitals q1  - pain control: tylenol held i/s/o low grade fever  - vEEG (10/3-)- negative, (10/17-10/19) - negative  - CTH : enlarged pontine hemorrhage, CTH 10/3: stable, CTH 10/25: mostly resolved pontine hemorrhage, no new hemorrhage or infarct, no herniation  - MRI brain 10/12: parenchymal hemorrhage, acute/subacute R cerebellar stroke    - stroke core measures, stroke neuro signed off    CV:  - -160  - HTN: holding amlodipine 10 mg, lisinopril 5 mg   - echo () EF 75%    Resp:  - trach collar w/ cpap  overnight  - Secretions: duonebs/mucomyst/chest PT q8h   - CTA chest  negative for PE, inc ground glass opacities    GI:  - TF via PEG (placed 10/21 by gen surg)  - bowel regimen, last BM    - CTAP  negative for infection    - FOBT negative     Renal:  - IVL  - FW flushes 250cc q6hrs for uptrending BUN/Cr  - hyperK: held lokelma 5mg qd  - Hyperphos: added phoslo BID  - Urinary retenion: bladder scan q6, sc PRN  - CKD: trend BUN/Cr  - renal US 10/1: echogenicity c/w chronic med renal dz, repeat 10/8: inc renal echogeicity, c/f medical renal disease w increased hydro     Endo:  - A1c 5.4    Heme:  - DVT ppx: SCDs, SQH 5000u q8h   - LE dopplers negative     ID:  - febrile, last pancx , MRSA swab neg    -  s/p Stenotrophomonas maltophilia PNA: s/p Bactrim (-) s/p Cefepime (-)  - 11/3, (+) sputum for stenotrophomas maltophlia, blood cx (+) klebsiella, cefepime 2gq12 ( - )   - empiric tx: s/p zosyn (11/3-) , s/p vanc  (11/3- )  - S/p Ancef (10/4-10/14) for PNA, and s/p Unasyn (10/18-10/23) +actinobacter baumanii    MISC:  - ophtho consult for keratitis, s/p erythromycin ointment to rt eye q4hrs, ofloxacin ointment to rt eye QID, artificial tears to rt eye q2hrs, moisture chamber at bedtime    Dispo: NSICU status, full code, pending placement and emergency medicaid     D/w Dr. D'Amico and Dr. Iyer

## 2024-11-26 NOTE — CHART NOTE - NSCHARTNOTEFT_GEN_A_CORE
Admitting Diagnosis:   Patient is a 44y old  Male who presents with a chief complaint of brain stem bleed (26 Nov 2024 00:19)  PAST MEDICAL & SURGICAL HISTORY:  Acute myocardial infarction  CVA (cerebral vascular accident)    Current Nutrition Order: Maribeth Sauceda Renal Support 1.8: at goal of 60mL/hour x 18 hours, providing 1080mL total volume, 1944 calories, 86g protein, ~713mL free water; this meets ~24 calories/kg ideal body weight, ~1.1g protein/kg ideal body weight; with Banatrol Tube Feeding 2x/day    PO Intake: NPO with tube feeds meeting 100% estimated nutrient needs     GI Issues: soft/nontender abdomen, no noted distention per nursing; BM smear noted per nursing, BM on 11/26/24    Pain: Absence of non-verbal indicators of pain    Skin Integrity: intact, noted with left hand plaque; no pressure ulcers, no noted edema; PEG present    11-25-24 @ 07:01  -  11-26-24 @ 07:00  --------------------------------------------------------  IN: 3260 mL / OUT: 1475 mL / NET: 1785 mL        Labs:   11-26    139  |  107  |  71[H]  ----------------------------<  104[H]  4.1   |  21[L]  |  1.46[H]    Ca    9.1      26 Nov 2024 05:30  Phos  3.3     11-26  Mg     2.3     11-26    TPro  7.4  /  Alb  3.7  /  TBili  0.4  /  DBili  x   /  AST  19  /  ALT  33  /  AlkPhos  136[H]  11-24    CAPILLARY BLOOD GLUCOSE      POCT Blood Glucose.: 111 mg/dL (25 Nov 2024 15:52)      Medications:  MEDICATIONS  (STANDING):  acetylcysteine 10%  Inhalation 4 milliLiter(s) Inhalation every 8 hours  albuterol/ipratropium for Nebulization 3 milliLiter(s) Nebulizer every 8 hours  artificial tears (preservative free) Ophthalmic Solution 1 Drop(s) Right EYE every 4 hours  chlorhexidine 0.12% Liquid 15 milliLiter(s) Oral Mucosa every 12 hours  chlorhexidine 2% Cloths 1 Application(s) Topical <User Schedule>  doxazosin 8 milliGRAM(s) Oral at bedtime  heparin   Injectable 5000 Unit(s) SubCutaneous every 8 hours  petrolatum Ophthalmic Ointment 1 Application(s) Both EYES two times a day  senna 2 Tablet(s) Oral at bedtime    MEDICATIONS  (PRN):  acetaminophen   Oral Liquid .. 650 milliGRAM(s) Oral every 6 hours PRN Temp greater or equal to 38.5C (101.3F), Mild Pain (1 - 3)    Dosing Anthropometrics:   Height for BMI (FEET)	5 Feet  Height for BMI (INCHES)	8 Inch(s)  Height for BMI (CM)	172.72 Centimeter(s)  Weight for BMI (lbs)	176.4 lb  Weight for BMI (kg)	80 kg  Body Mass Index	              26.8  ideal body weight:               154 pounds / 70 kg   %ideal body weight             114%     Weight Change: No new wt obtained since admit, strongly recommend nursing to obtain new wt to track/ trend changes     Estimated energy needs:  Weight used for calculations	ideal body weight  Estimated Energy Needs Weight (lbs)	154 lb  Estimated Energy Needs Weight (kg)	69.8 kg  Estimated Energy Needs From (christiana/kg)	25  Estimated Energy Needs To (christiana/kg)	30  Estimated Energy Needs Calculated From (christiana/kg)	1745  Estimated Energy Needs Calculated To (christiana/kg)	2094  Weight used for calculations	ideal body weight  Estimated Protein Needs Weight (lbs)	154 lb  Estimated Protein Needs Weight (kg)	69.8 kg  Estimated Protein Needs From (g/kg)	1.2  Estimated Protein Needs To (g/kg)	1.4  Estimated Protein Needs Calculated From (g/kg)	83.76  Estimated Protein Needs Calculated To (g/kg)	97.72  Estimated Fluid Needs Weight (lbs)	154 lb  Estimated Fluid Needs Weight (kg)	69.8 kg  Estimated Fluid Needs From (ml/kg)	25  Estimated Fluid Needs To (ml/kg)	30  Estimated Fluid Needs Calculated From (ml/kg)	1745  Estimated Fluid Needs Calculated To (ml/kg)	2094  Other Calculations	Pt is >100% ideal body weight in the ICU, thus ideal body weight used for all calculations. Needs adjusted for age, clinical course, ICU/critical care status, no longer vented.    Subjective: This is a 44 year old male, unknown past medical history (reported stroke and MI by coworkers) presented to [facility] with AMS, Pt was working at [employer] when he was found down by coworkers. EMS called and pt brought to [facility] ED. Intubated, sedated, started on cardene for SBPs in 200s. CT head showed brain stem bleed. Transferred to NSICU for further management.    Patient seen at bedside on 8 [place] for nutrition follow up. Pt afebrile, no drips/pressor support, on tracheostomy collar (SpO2 %). Current diet order: remains NPO with tube feeds meeting 100% estimated nutrient needs. Pt is receiving Foursquare Renal 1.8 instead of Nepro formula related to Nepro formula out of stock. Soft/nontender abdomen, no noted distention per nursing; BM smear noted per nursing, BM on 11/26/24. Absence of non-verbal indicators of pain. Skin intact, noted with left hand plaque; no pressure ulcers, no noted edema; PEG present. Labs reviewed: elevated BUN (71), Creatinine (1.46), electrolytes are within normal limits at this time; medical team is aware. Medical team stated pt might be stepped down from ICU; RDN will continue to follow. Please see nutrition recommendations below.    Previous Nutrition Diagnosis: Inadequate Oral Intake related to inability to meet nutritional demands via PO as evidenced by NPO with tube feedings    Active [  x ]  Resolved [   ]    Goal: Pt will consume >/= 75% of all meals and supplements via tolerated route     Nutrition Recommendations:  1. NPO with tube feedings, continue with current tube feed regimen to continue to meet 100% estimated needs:  **Foursquare Renal Support 1.8: at goal of 60mL/hour x 18 hours, providing 1080mL total volume, 1944 calories, 86g protein, ~713mL free water; this meets ~24 calories/kg ideal body weight, ~1.1g protein/kg ideal body weight**  **Provide Banatrol Tube Feeding prn**  **Maintain aspiration precautions at all times; monitor for signs/symptoms of intolerance**  2. Monitor weights (weekly), GI function, skin integrity; electrolytes, BMP, glucose, CBC per MD discretion **renal indices**  3. Pain and bowel regimen per medical team  4. Align nutrition with goals of care at all times  5. Recommend nursing to obtain new wts to track/trend changes    Risk Level: High [  ] Moderate [ x ] Low [   ].

## 2024-11-26 NOTE — PROGRESS NOTE ADULT - ASSESSMENT
44y/M with  large pontine hemorrhage, SAH, brain edema; ?locked-in  CVA  h/o MI  acute on chronic CKD, hydronephrosis    PLAN:   NEURO: neurochecks VS q4 PRN pain meds   neurostim: off   palliative / ethics follow-up  off VEEG - neg  REHAB:  physical therapy evaluation and management    EARLY MOB:  bedrest HOB up    PULM:  trach collar 40%; pulmo toilette, CPAP 5/5 overnight, standing ipratropium / albuterol / mucomyst; chest PT, HOB up  CARDIO:  SBP goal 100-160mm Hg,  cont amlodipine, EF 75%; d/c lisinopril  ENDO:  Blood sugar goals 140-180 mg/dL, A1C 5.4, ISS  GI:  bowel regimen  DIET: TF   RENAL:  IVL, Na goal 135-145, TOV cont doxazosin 8  HEM/ONC:  Hb stable  VTE Prophylaxis: SCDs, SQH  ID: afebrile, no leukocytosis  Social: will update family    Active issues:  What's keeping patient in the ICU? shock  What is this patient's dispo plan? stepdown once stable    Addendum:  Episode of hypotension to SBP 66mm Hg, 45 mins on sitting up on chair - reverse trendelenburg, back to bed, given 1 L fluid bolus.  hold stepdown.  continue monitoring. VSq1h,     ATTENDING ATTESTATION:  Patient at high risk for neurological deterioration or death due to:  ICU delirium, aspiration PNA, DVT / PE.  Critical care time:  I have personally provided 60 minutes of critical care time, excluding time spent on separate procedures.      Plan discussed with RN, house staff.       44y/M with  large pontine hemorrhage, SAH, brain edema; ?locked-in  CVA  h/o MI  acute on chronic CKD, hydronephrosis    PLAN:   NEURO: neurochecks VS q4 PRN pain meds   neurostim: off   palliative / ethics follow-up  off VEEG - neg  REHAB:  physical therapy evaluation and management    EARLY MOB:  bedrest HOB up    PULM:  trach collar 40%; pulmo toilette, standing ipratropium / albuterol / mucomyst; chest PT, HOB up  CARDIO:  SBP goal 100-160mm Hg,  d/c amlodipine, lisinopril; EF 75%  ENDO:  Blood sugar goals 140-180 mg/dL, A1C 5.4, ISS  GI:  bowel regimen  DIET: TF   RENAL:  IVL, Na goal 135-145, TOV cont doxazosin 8; d/c PhosLo  HEM/ONC:  Hb stable  VTE Prophylaxis: SCDs, SQH  ID: afebrile, no leukocytosis  Social: will update family    Active issues:  What's keeping patient in the ICU? stepdown this afternoon   What is this patient's dispo plan? stepdown once stable      ATTENDING ATTESTATION:  Patient not at high risk for neurologic deterioration / death.  Time spent on this noncritically ill patient: 55 minutes spent on total encounter, more than 50% of the visit was spent counseling and/or coordinating care by the attending physician.      Plan discussed with RN, house staff.    REVIEW OF SYSTEMS:  No headaches, no nausea or vomiting; 14 -point review of systems otherwise unremarkable.      ICU stepdown Checklist:    Completed: 11-26 @ 09:54    [X] hemodynamically stable – VS WNL and stable x 24hours, UO adequate  [n/a ] if  previously on HDA - off pressors x 24h with stable neuro exam    [X] no new symptoms x 24h (i.e. new fever, new-onset nausea/vomiting)  [X] stable labs: (i.e. WBC not rising, sodium not dropping)  [  ] patient not at high risk for aspiration, if high risk then:                  [ ] should have definitive plans for trach/PEG (alternative option is to discharge from ICU to facilty)                  [X] stepdown to bed close to nurse’s station  [n/a] low suctioning requirements (i.e. q4h or less)  [X] sign-off from primary RN*  Vanessa Coma   [X] drains do not require ICU level of care  [X] if patient previously agitated or with behavioral issues – controlled   [X] pain controlled

## 2024-11-27 LAB
-  LEVOFLOXACIN: SIGNIFICANT CHANGE UP
-  MINOCYCLINE: SIGNIFICANT CHANGE UP
-  TRIMETHOPRIM/SULFAMETHOXAZOLE: SIGNIFICANT CHANGE UP
ANION GAP SERPL CALC-SCNC: 11 MMOL/L — SIGNIFICANT CHANGE UP (ref 5–17)
BUN SERPL-MCNC: 64 MG/DL — HIGH (ref 7–23)
CALCIUM SERPL-MCNC: 9.2 MG/DL — SIGNIFICANT CHANGE UP (ref 8.4–10.5)
CHLORIDE SERPL-SCNC: 103 MMOL/L — SIGNIFICANT CHANGE UP (ref 96–108)
CO2 SERPL-SCNC: 21 MMOL/L — LOW (ref 22–31)
CREAT SERPL-MCNC: 1.33 MG/DL — HIGH (ref 0.5–1.3)
CULTURE RESULTS: ABNORMAL
EGFR: 67 ML/MIN/1.73M2 — SIGNIFICANT CHANGE UP
EGFR: 67 ML/MIN/1.73M2 — SIGNIFICANT CHANGE UP
GLUCOSE SERPL-MCNC: 109 MG/DL — HIGH (ref 70–99)
HCT VFR BLD CALC: 33.8 % — LOW (ref 39–50)
HGB BLD-MCNC: 11 G/DL — LOW (ref 13–17)
MAGNESIUM SERPL-MCNC: 2.1 MG/DL — SIGNIFICANT CHANGE UP (ref 1.6–2.6)
MCHC RBC-ENTMCNC: 30.2 PG — SIGNIFICANT CHANGE UP (ref 27–34)
MCHC RBC-ENTMCNC: 32.5 G/DL — SIGNIFICANT CHANGE UP (ref 32–36)
MCV RBC AUTO: 92.9 FL — SIGNIFICANT CHANGE UP (ref 80–100)
METHOD TYPE: SIGNIFICANT CHANGE UP
METHOD TYPE: SIGNIFICANT CHANGE UP
NRBC # BLD: 0 /100 WBCS — SIGNIFICANT CHANGE UP (ref 0–0)
NRBC BLD-RTO: 0 /100 WBCS — SIGNIFICANT CHANGE UP (ref 0–0)
ORGANISM # SPEC MICROSCOPIC CNT: ABNORMAL
ORGANISM # SPEC MICROSCOPIC CNT: ABNORMAL
ORGANISM # SPEC MICROSCOPIC CNT: SIGNIFICANT CHANGE UP
PHOSPHATE SERPL-MCNC: 3.9 MG/DL — SIGNIFICANT CHANGE UP (ref 2.5–4.5)
PLATELET # BLD AUTO: 212 K/UL — SIGNIFICANT CHANGE UP (ref 150–400)
POTASSIUM SERPL-MCNC: 4 MMOL/L — SIGNIFICANT CHANGE UP (ref 3.5–5.3)
POTASSIUM SERPL-SCNC: 4 MMOL/L — SIGNIFICANT CHANGE UP (ref 3.5–5.3)
RBC # BLD: 3.64 M/UL — LOW (ref 4.2–5.8)
RBC # FLD: 14.6 % — HIGH (ref 10.3–14.5)
SODIUM SERPL-SCNC: 135 MMOL/L — SIGNIFICANT CHANGE UP (ref 135–145)
SPECIMEN SOURCE: SIGNIFICANT CHANGE UP
WBC # BLD: 6.68 K/UL — SIGNIFICANT CHANGE UP (ref 3.8–10.5)
WBC # FLD AUTO: 6.68 K/UL — SIGNIFICANT CHANGE UP (ref 3.8–10.5)

## 2024-11-27 PROCEDURE — 99232 SBSQ HOSP IP/OBS MODERATE 35: CPT

## 2024-11-27 RX ADMIN — Medication 2 TABLET(S): at 22:27

## 2024-11-27 RX ADMIN — Medication 1 APPLICATION(S): at 05:16

## 2024-11-27 RX ADMIN — Medication 1 DROP(S): at 17:20

## 2024-11-27 RX ADMIN — DOXAZOSIN MESYLATE 8 MILLIGRAM(S): 8 TABLET ORAL at 21:46

## 2024-11-27 RX ADMIN — ACETYLCYSTEINE 4 MILLILITER(S): 200 INHALANT RESPIRATORY (INHALATION) at 13:31

## 2024-11-27 RX ADMIN — HEPARIN SODIUM 5000 UNIT(S): 1000 INJECTION INTRAVENOUS; SUBCUTANEOUS at 21:47

## 2024-11-27 RX ADMIN — Medication 1 DROP(S): at 21:45

## 2024-11-27 RX ADMIN — ACETYLCYSTEINE 4 MILLILITER(S): 200 INHALANT RESPIRATORY (INHALATION) at 21:47

## 2024-11-27 RX ADMIN — IPRATROPIUM BROMIDE AND ALBUTEROL SULFATE 3 MILLILITER(S): .5; 2.5 SOLUTION RESPIRATORY (INHALATION) at 05:54

## 2024-11-27 RX ADMIN — IPRATROPIUM BROMIDE AND ALBUTEROL SULFATE 3 MILLILITER(S): .5; 2.5 SOLUTION RESPIRATORY (INHALATION) at 22:27

## 2024-11-27 RX ADMIN — HEPARIN SODIUM 5000 UNIT(S): 1000 INJECTION INTRAVENOUS; SUBCUTANEOUS at 05:16

## 2024-11-27 RX ADMIN — HEPARIN SODIUM 5000 UNIT(S): 1000 INJECTION INTRAVENOUS; SUBCUTANEOUS at 13:31

## 2024-11-27 RX ADMIN — ACETYLCYSTEINE 4 MILLILITER(S): 200 INHALANT RESPIRATORY (INHALATION) at 05:54

## 2024-11-27 RX ADMIN — Medication 1 DROP(S): at 13:39

## 2024-11-27 RX ADMIN — Medication 1 APPLICATION(S): at 17:20

## 2024-11-27 RX ADMIN — IPRATROPIUM BROMIDE AND ALBUTEROL SULFATE 3 MILLILITER(S): .5; 2.5 SOLUTION RESPIRATORY (INHALATION) at 13:31

## 2024-11-27 RX ADMIN — Medication 1 DROP(S): at 05:15

## 2024-11-27 NOTE — PROGRESS NOTE ADULT - SUBJECTIVE AND OBJECTIVE BOX
NEUROCRITICAL CARE PROGRESS NOTE    GARETT DELEON   MRN-[id number]    HPI:  Unknown age male, unknown past medical history (reported stroke and MI by coworkers) presented to [facility] with AMS, Pt was working at [employer] when he was found down by coworkers. EMS called and pt brought to [facility] ED. Intubated, sedated, started on cardene for SBPs in 200s. CT head showed brain stem bleed. Transferred to NSICU for further management.  (30 Sep 2024 12:55)    S/Overnight events: BD 58, GEORGE overnight. Neuro stable.     Hospital Course:   : transferred from [facility]. A line placed. Versed dc'd. Timate Prasanth at bedside, states pt has family in [place], cannot confirm medications or PMH other than stroke and MI. 250cc bolus 3% given. LR switched to NS. hydralazine 25q8 started, 3% started, switched propofol to precedex   10/1: stability CTH done. Added labetalol, started TF. Palliative consulted. ethics consulted to determine surrogate. febrile 103, pan cx sent  10/2: BD 2, GEORGE overnight. TF resumed. Desatt'd to 80s, FiO2 inc. to 50. Fentanyl given, ABG, CXR ordered. Maxxed on precedex, started on propofol for DARIEN -4 - -5. Precedex dc'd. Duonebs, mucomyst, hypertonic added. 3% dc'd. Cardene dc'd. Start vanc/CTX. Increased labetalol 200q8. MRSA negative, dc'd vanc. ETT pulled back 2cm x 2, good positioning after confirmatory chest xray. Ethics attempting to establish HCP with family. Na 159, starting FW 250q6 for range 150-155.   10/3: BD3, GEORGE o/n, neuro stable. Na elevating, FW increased to 300q6. Dc'd bowel reg for diarrhea. vEEG started. SQH 5000q8 tonight.   10/4: BD 4, albumn bolus, incr. LR to 80 2/2 incr. in Cr, LR to 100cc/hr for uptrending Cr. Started 7% hypersal for 48hrs and SL atropine for inline/oral thick secretions. Dc'd CTX and started ancef for MSSA in the sputum. Nephrology consulted for CKD, f/u recs. SBP 170s, given hydralazine 10mg IVP.   10/5: BD5, o/n 10mg IVP hydralazine given for SBP 170s and started on hydralazine 25q8 via OGT. 10mg IV push labetalol for SBP > 160s. RT placed for diarrhea.   10/6: BD6, o/n FW increased to 350q4 per nephrology recs. IV tylenol for temp 100.6, SBp 160s presumed uncomfortable.   10/7: BD7, overnight pancultured for temp 101.8F.   10/8: BD8. GEORGE. Cr bumped. decreased LR to 75cc/hr. Adding simethicone ATC. incr hydralazine 50mgTID. Incr labetalol 300mgTID. Na 145, decreased FWF to 250q6. Start precedex. FENa consistent with intrinsic kidney injury. Pend repeat renal US. Retaining up to 1.3L, bladder scans q6, straight cath PRN  10/9: BD 9. GEORGE overnight. Neuro stable. abd xray for distention w non-specific gas pattern, OGT to LIWS for morning. duonebs/mucomyst to q8 for improving secretions. Changed tube feeds to Jevity 1.5 20cc/hr, low rate due to abdominal distention, nepro dense and more difficult to digest. Tolerating CPAP, confirmed by ABG.   10/10: BD 10. GEORGE overnight. Neuro stable. (+) gabriel for urinary retention on bladder scan. inc TF to goal rate of 40cc/hr. family leaning toward pursuing trach/PEG. 1/2 amp for FS 81.   10/11: BD 11. GEORGE overnight. Neuro stable. Trach/PEG consults placed.   10/12: BD 12. GEORGE overnight. Neuro stable. MRI brain complete.   10/13: BD 13. Increase flomax. Hold SQH after PM dose for trach tm. IVL.   10/14: BD 14. GEORGE overnight, remains on AC/VC. Gabriel placed for urinary retention. Dc'd free water.  S/p trach with pulm. NGT placed and CXR confirmed in good position.   10/15: BD 15, GEORGE ovn. resumed feeds. spiked 101, pan cx sent.   10/16: BD 16. GEORGE ovn. Lokelma 5mg for K+ 5.4. Started vanc q 24/zosyn for empiric PNA coverage, IVF to 100/hr. PEG held for fever.   10/17: BD 17,  ordered serum osm and urine osm for am. Started sinemet for neurostimulation. Increased cardura to 0.8. Started FW 100q4, dc'd IVF. MRSA negative, dc'd vanc. NGT replaced d/t coiling.   10/18: BD 18, GEORGE overnight, neuro stable. Amantadine added for neurostim. zosyn changed to unasyn for acinetobacter baumannii, failed TOV and required SC  10/19: BD 19, GEORGE ovn. cardura 2mg added for retention. labetalol decreased 200q8, hydralazine decreased 25q8. Gabriel replaced.   10/20: BD20, GEORGE overnight. NGT dislodged, replaced. PEG tomorrow w/ gen surg, FW increased to 150q4 and labetalol decreased to 100q8, lokelma given for hyperkalemia.   10/21: BD 21. POD0 PEG placement with Gen surg. decr labetolol to 50q8, incr. cardura to 0.4, started lokelma and phoslo, dc gabriel POD0 PEG placement with Gen surg.  10/22: BD 22. Plan to start TF today via PEG. dc labetalol, Following ophtho recs. Increased apnea settings - found to be in cheyne-moe respiration. CPAP .  10/23: BD 23. hydralazine d/c'd, trach collar trial today. Rectal tube placed at 6am.  10/24: BD24, o/n lokelma held due to diarrhea. Free water 100q6 resumed. dc'd tamsulosin, amantadine. Incr'd cardura to 8mg qhs. Dc'd FW. Switched jevity to nepro. gabriel placed for high urine output. Started SL atropine for oral secretions. Dc'd free water.  10/25: BD25, o/n decreased suctioning requirements to > q4hrs, GEORGE. Cr improving, cont phoslo, lokelma held at this time. Gabriel placed yest, cont. Tolerating trach collar. Given 500cc plasmalyte bolus for ANIKA. Dc'd sinemet.   10/26: BD26, o/n resumed lokelma 5mg daily and resumed 100cc free water q6hrs. Change in neuro status with new right pupillary dilation with anisocoria (right pupil 6mm fixed and left pupil 3mm briskly reactive). Given 23.4% NaCl bullet, taken for emergent CTH showing mostly resolved pontine hemorrhage, continued brainstem hypodensity likely edema d/t hemorrhage, no new hemorrhage or infarct, no herniation, mild increase in size of left lateral ventricle. Vitals remaine stable. Na goal > 140.   10/27: BD27, o/n GEORGE.Neuro stable. Pend stepdown with airway bed.   10/28: BD 28. GEORGE overnight. Neuro stable. Miralax ordered. Gabriel removed, pending TOV.  10/29: BD 29. GEORGE o/n. Given 2L NS over 8 hrs for increased BUN/Cr ratio. Gabriel placed for frequent straight cath.   10/30: BD 30.   10/31: BD 31. GEORGE overnight. Na 149, increased free water to 200q6. 1L NS for uptrending BUN.   : BD 32. GEORGE overnight. Given 1L NS for dehydration. Na 146, increased FW to 250q6.   : BD 33 GEORGE overnight, neuro stable, given 500cc bolus for net negative status and tachycardia   11/3: BD 34, GEORGE overnight, neuro stable. Patient remains tachycardic, EKG showing sinus tachycardia, given additional 500cc NS bolus. Febrile to 101.9F, pan cultured (without UA), CXR WNL, given tylenol.   : BD 35, GEORGE overnight, neuro stable. Given 1L NS for tachycardia. sputum (+) for stenotropohomas maltophilia.   : BD 36 GEORGE overnight, neuro stable. Vancomycin dc'd. Chest PT BID. ID consulted, cont zosyn.  : BD 37. blood cx + klebsiella dc zosyn changed to cefepime, CTAP ordered, rpt blood cx sent.    : BD 38. Pending CT A/P, given 250cc bolus and starting maintenance fluids overnight. Pending CT A/P after bolus   : BD 39. CT CAP negative for infection.   : BD 40. GEORGE overnight.  11/10: BD 41. GEORGE overnight. desat to 85 on trach collar, O2 inc to 10L and 100%, O2 sat inc to 95. pt tachy to 110s, euvolemic. given tylenol. ABG and CXR ordered. spiked fever, pancultured, RVP negative. AM ABG w pO2 79, rpt w pO2 79. pt appears comfortable, satting 94%.   : BD 42. GEORGE overnight. pt became tachy to 130s, desat to 90 on 100% FiO2 and 10L. suctioned, (+) productive cough. temp 101.4, given 1g IV tylenol and 500cc NS bolus for euvolemia. fever and HR downtrending. LE dopplers negative for dvt  : BD 43, GEORGE ovn, fever and HR downtrending, satting 97% 70% FIO2  : BD 44, GEORGE ovn. started standing tylenol x24 hours for tachycardia. desat to 80s, o2 increased. CXR stable, pending CTA PE protocol.   : BD 45, GEORGE overnight, neuro stable. resp therapy dec FiO2 to 70%.   11/15: BD 46, GEORGE overnight, neuro stable.  Rapid called for desaturation 30s, tachycardic 140s. Patient bagged, 100% fio2, heavily suctioned. CXR/POCUS unremarkable. ABG c/w desaturation. WBC 14.71. Afebrile. O2 improved to 90s and patient upgraded to ICU. ABG paO2 30s improved to 89 on vent. IV Tylenol x 1, sputum sent. Start protonix while o-n vent.   : BD 47. POCUS showed collapsable IVCF, given 1L bolus. Vanco/Cefpime added empriic for PNA, NGT feeds restarted. MRSA swab neg, Vanc DC'd.   : BD 48. GEORGE overnight. 1l bolus for tachycardia. Spiked to 101, cultured. 500cc bolus for tachycardia, tachy to 148 given 25mcg fentanyl, 250cc albumin, 1.5L bolus. 5 IV lopressor with response HR to 100s. +Stenotrophomonas on sputum cx.   : BD 49. GEORGE overnight. Consulted ID, cefepime switched to bactrim x7days. Started hydrochlorothiazine 12.5mg daily.  : BD 50. Tachy 120s, given tylenol and 500cc NS. Tolerating 5/5, switched to TCx. Holding phos binder. D/c Bactrim. D/c gabriel, f/u TOV. Dc'd PPI.   : BD 51. GEORGE ovn. 1600 satting low 90s, mildly tachy to 110s, afebrile, RR wnl. O2 improved to mid 90s while inc O2 to 100% on TCx. CPAP 5/5 placed back on.  : BD 52, GEORGE ovn, tolerating CPAP 5/5. Switch to trach collar during the day if tolerating well. HCTZ held for Cr bump, straight cath frequence increased to q4  : BD 53, GEORGE ovn. Resumed phoslo. Gabriel placed. Resumed HCTZ.   : BD 54. Holding tylenol in setting of possible fever, will require pan cx if febrile. Cr improved today. Cont CPAP. Bowel regimen held i/s/o diarrhea. FOBT negative.  : BD 55. GEORGE overnight. Neuro stable. HCTZ dc'ed, started lisinopril 5. Lokelma dc'ed for K 3.7.   : BD 56, GEORGE overnight. Neuro stable. dc'd lisinopril 5mg. Gabriel dc'd. TOV. 1545 noted to be hypotensive, MAP 50, in supine position on chair, HR 60s, afebrile, O2 96%. Given 1L cc NS bolus, placed back on bed in reverse trendelenberg, improved to map of 66. Neostick at bedside. Vitals check q1h. Dc'ed amlodipine. Failed TOV, bladder scan q6, sc prn. Added back Senna.   : BD 57, GEORGE overnight. Neuro stable. Dc'd phoslo.   : BD 58, GEORGE overnight. Neuro stable.         Vital Signs Last 24 Hrs  T(C): 36 (2024 01:26), Max: 36.8 (2024 04:25)  T(F): 96.8 (:), Max: 98.2 (2024 04:25)  HR: 68 () (64 - 84)  BP: 103/71 () (96/66 - 127/89)  BP(mean): 83 () (76 - 104)  RR: 16 () (15 - 18)  SpO2: 100% () (97% - 100%)    Parameters below as of   Patient On (Oxygen Delivery Method): tracheostomy collar  O2 Flow (L/min): 10  O2 Concentration (%): 40    Mode: standby    I&O's Detail    2024 07:01  -  2024 07:00  --------------------------------------------------------  IN:    Enteral Tube Flush: 180 mL    Free Water: 1000 mL    Miscellaneous Tube Feedin mL    Sodium Chloride 0.9% Bolus: 1000 mL  Total IN: 3260 mL    OUT:    Indwelling Catheter - Urethral (mL): 275 mL    Intermittent Catheterization - Urethral (mL): 1200 mL  Total OUT: 1475 mL    Total NET: 1785 mL      2024 07:01  -  2024 01:54  --------------------------------------------------------  IN:    Free Water: 750 mL    Miscellaneous Tube Feedin mL  Total IN: 1770 mL    OUT:    Intermittent Catheterization - Urethral (mL): 1075 mL    Voided (mL): 0 mL  Total OUT: 1075 mL    Total NET: 695 mL      Physical Exam  Constitutional: Lying in bed, in NAD  Respiratory: +Trach collar, breathing non-labored, symmetrical chest wall movement  Cardiovascular RRR, no murmurs  Gastrointestinal: +PEG, abdomen soft, non tender  Genitourinary: exam deferred  Neurological:  Opens eyes spontaneously, intermittently following commands. Awake and alert but not oriented.  Cranial Nerves: PERRL 3mm brisk, tracks vertically, facial movements appear symmetric, tongue midline. No verbal output.   Motor: LUE/LLE withdraws to noxious, RUE trace movement in fingers spontaneously, RLE wiggles toes to command.   Sensation: intact to light touch in all extremities      LABS:                        9.9    7.05  )-----------( 239      ( 2024 05:30 )             31.8     11    139  |  107  |  71[H]  ----------------------------<  104[H]  4.1   |  21[L]  |  1.46[H]    Ca    9.1      2024 05:30  Phos  3.3       Mg     2.3             Urinalysis Basic - ( 2024 05:30 )    Color: x / Appearance: x / SG: x / pH: x  Gluc: 104 mg/dL / Ketone: x  / Bili: x / Urobili: x   Blood: x / Protein: x / Nitrite: x   Leuk Esterase: x / RBC: x / WBC x   Sq Epi: x / Non Sq Epi: x / Bacteria: x          CAPILLARY BLOOD GLUCOSE      POCT Blood Glucose.: 104 mg/dL (2024 17:27)      Drug Levels: [] N/A    CSF Analysis: [] N/A      Allergies    Allergy Status Unknown    Intolerances      MEDICATIONS:  Antibiotics:    Neuro:  acetaminophen   Oral Liquid .. 650 milliGRAM(s) Oral every 6 hours PRN    Anticoagulation:  heparin   Injectable 5000 Unit(s) SubCutaneous every 8 hours    OTHER:  acetylcysteine 10%  Inhalation 4 milliLiter(s) Inhalation every 8 hours  albuterol/ipratropium for Nebulization 3 milliLiter(s) Nebulizer every 8 hours  artificial tears (preservative free) Ophthalmic Solution 1 Drop(s) Right EYE every 4 hours  chlorhexidine 2% Cloths 1 Application(s) Topical <User Schedule>  doxazosin 8 milliGRAM(s) Oral at bedtime  petrolatum Ophthalmic Ointment 1 Application(s) Both EYES two times a day  senna 2 Tablet(s) Oral at bedtime    IVF:    CULTURES:  Culture Results:   Mixed gram negative rods including Moderate Stenotrophomonas maltophilia  Commensal irma consistent with body site ( @ 00:19)  Culture Results:   No growth at 5 days ( @ 05:59)    Assessment  43 y/o PMH ?stroke/MI present to [facility] after collapsing at work. Decorticate posturing, vomiting, intubated for airway protection. 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 Transferred to [facility] for further management. s/p trach 10/14. s/p peg 10/21. Re-upgrade to ICU /2 desaturation event and suctioning requirements 11/15.     PLAN:  Neuro:  - neuro/vitals q4h, protected sleep time 10PM-5AM  - pain control: tylenol prn  - vEEG (10/3-)- negative, (10/17-10/19) - negative  - CTH : enlarged pontine hemorrhage, CTH 10/3: stable, CTH 10/25: mostly resolved pontine hemorrhage, no new hemorrhage or infarct, no herniation  - MRI brain 10/12: parenchymal hemorrhage, acute/subacute R cerebellar stroke    - stroke core measures, stroke neuro signed off    CV:  - -160  - HTN: holding amlodipine 10 mg, lisinopril 5 mg   - echo () EF 75%    Resp:  - trach collar   - Secretions: duonebs/mucomyst/chest PT q8h   - CTA chest  negative for PE, inc ground glass opacities    GI:  - TF via PEG (placed 10/21 by gen surg)  - bowel regimen, last BM   - CTAP  negative for infection    - FOBT negative     Renal:  - IVL  - FW flushes 250cc q6hrs for uptrending BUN/Cr  - hyperK: held lokelma 5mg qd  - Hyperphos: held phoslo BID  - Urinary retenion: bladder scan q6, sc PRN  - CKD: trend BUN/Cr  - renal US 10/1: echogenicity c/w chronic med renal dz, repeat 10/8: inc renal echogeicity, c/f medical renal disease w increased hydro     Endo:  - A1c 5.4    Heme:  - DVT ppx: SCDs, SQH 5000u q8h   - LE dopplers negative     ID:  - febrile, last pancx , MRSA swab neg    -  s/p Stenotrophomonas maltophilia PNA: s/p Bactrim (-) s/p Cefepime (-)  - 11/3, (+) sputum for stenotrophomas maltophlia, blood cx (+) klebsiella, cefepime 2gq12 ( - )   - empiric tx: s/p zosyn (11/3-) , s/p vanc  (11/3- )  - S/p Ancef (10/4-10/14) for PNA, and s/p Unasyn (10/18-10/23) +actinobacter baumanii    MISC:  - ophtho consult for keratitis, s/p erythromycin ointment to rt eye q4hrs, ofloxacin ointment to rt eye QID, artificial tears to rt eye q2hrs, moisture chamber at bedtime    Dispo: NSICU status, full code, pending placement and emergency medicaid     D/w Dr. D'Amico and Shireen

## 2024-11-28 PROCEDURE — 99232 SBSQ HOSP IP/OBS MODERATE 35: CPT

## 2024-11-28 RX ADMIN — Medication 2 TABLET(S): at 22:25

## 2024-11-28 RX ADMIN — IPRATROPIUM BROMIDE AND ALBUTEROL SULFATE 3 MILLILITER(S): .5; 2.5 SOLUTION RESPIRATORY (INHALATION) at 14:55

## 2024-11-28 RX ADMIN — HEPARIN SODIUM 5000 UNIT(S): 1000 INJECTION INTRAVENOUS; SUBCUTANEOUS at 14:55

## 2024-11-28 RX ADMIN — ACETYLCYSTEINE 4 MILLILITER(S): 200 INHALANT RESPIRATORY (INHALATION) at 22:24

## 2024-11-28 RX ADMIN — Medication 1 APPLICATION(S): at 17:23

## 2024-11-28 RX ADMIN — DOXAZOSIN MESYLATE 8 MILLIGRAM(S): 8 TABLET ORAL at 22:24

## 2024-11-28 RX ADMIN — ACETYLCYSTEINE 4 MILLILITER(S): 200 INHALANT RESPIRATORY (INHALATION) at 14:55

## 2024-11-28 RX ADMIN — Medication 1 DROP(S): at 01:41

## 2024-11-28 RX ADMIN — Medication 1 DROP(S): at 14:55

## 2024-11-28 RX ADMIN — ACETYLCYSTEINE 4 MILLILITER(S): 200 INHALANT RESPIRATORY (INHALATION) at 06:08

## 2024-11-28 RX ADMIN — IPRATROPIUM BROMIDE AND ALBUTEROL SULFATE 3 MILLILITER(S): .5; 2.5 SOLUTION RESPIRATORY (INHALATION) at 06:08

## 2024-11-28 RX ADMIN — Medication 1 DROP(S): at 17:23

## 2024-11-28 RX ADMIN — HEPARIN SODIUM 5000 UNIT(S): 1000 INJECTION INTRAVENOUS; SUBCUTANEOUS at 22:25

## 2024-11-28 RX ADMIN — Medication 1 APPLICATION(S): at 06:38

## 2024-11-28 RX ADMIN — Medication 1 DROP(S): at 06:10

## 2024-11-28 RX ADMIN — IPRATROPIUM BROMIDE AND ALBUTEROL SULFATE 3 MILLILITER(S): .5; 2.5 SOLUTION RESPIRATORY (INHALATION) at 22:25

## 2024-11-28 RX ADMIN — Medication 1 DROP(S): at 22:24

## 2024-11-28 RX ADMIN — Medication 1 DROP(S): at 09:50

## 2024-11-28 RX ADMIN — HEPARIN SODIUM 5000 UNIT(S): 1000 INJECTION INTRAVENOUS; SUBCUTANEOUS at 06:07

## 2024-11-28 NOTE — PROGRESS NOTE ADULT - SUBJECTIVE AND OBJECTIVE BOX
HPI:  Unknown age male, unknown past medical history (reported stroke and MI by coworkers) presented to [facility] with AMS, Pt was working at [employer] when he was found down by coworkers. EMS called and pt brought to [facility] ED. Intubated, sedated, started on cardene for SBPs in 200s. CT head showed brain stem bleed. Transferred to NSICU for further management.  (30 Sep 2024 12:55)    INTERVAL EVENTS:  GEORGE o/n    HOSPITAL COURSE:  9/30: transferred from [facility]. A line placed. Versed dc'd. Cy Rader at bedside, states pt has family in [place], cannot confirm medications or PMH other than stroke and MI. 250cc bolus 3% given. LR switched to NS. hydralazine 25q8 started, 3% started, switched propofol to precedex   10/1: stability CTH done. Added labetalol, started TF. Palliative consulted. ethics consulted to determine surrogate. febrile 103, pan cx sent  10/2: BD 2, GEORGE overnight. TF resumed. Desatt'd to 80s, FiO2 inc. to 50. Fentanyl given, ABG, CXR ordered. Maxxed on precedex, started on propofol for DARIEN -4 - -5. Precedex dc'd. Duonebs, mucomyst, hypertonic added. 3% dc'd. Cardene dc'd. Start vanc/CTX. Increased labetalol 200q8. MRSA negative, dc'd vanc. ETT pulled back 2cm x 2, good positioning after confirmatory chest xray. Ethics attempting to establish HCP with family. Na 159, starting FW 250q6 for range 150-155.   10/3: BD3, GEORGE o/n, neuro stable. Na elevating, FW increased to 300q6. Dc'd bowel reg for diarrhea. vEEG started. SQH 5000q8 tonight.   10/4: BD 4, albumn bolus, incr. LR to 80 2/2 incr. in Cr, LR to 100cc/hr for uptrending Cr. Started 7% hypersal for 48hrs and SL atropine for inline/oral thick secretions. Dc'd CTX and started ancef for MSSA in the sputum. Nephrology consulted for CKD, f/u recs. SBP 170s, given hydralazine 10mg IVP.   10/5: BD5, o/n 10mg IVP hydralazine given for SBP 170s and started on hydralazine 25q8 via OGT. 10mg IV push labetalol for SBP > 160s. RT placed for diarrhea.   10/6: BD6, o/n FW increased to 350q4 per nephrology recs. IV tylenol for temp 100.6, SBp 160s presumed uncomfortable.   10/7: BD7, overnight pancultured for temp 101.8F.   10/8: BD8. GEORGE. Cr bumped. decreased LR to 75cc/hr. Adding simethicone ATC. incr hydralazine 50mgTID. Incr labetalol 300mgTID. Na 145, decreased FWF to 250q6. Start precedex. FENa consistent with intrinsic kidney injury. Pend repeat renal US. Retaining up to 1.3L, bladder scans q6, straight cath PRN  10/9: BD 9. GEORGE overnight. Neuro stable. abd xray for distention w non-specific gas pattern, OGT to LIWS for morning. duonebs/mucomyst to q8 for improving secretions. Changed tube feeds to Jevity 1.5 20cc/hr, low rate due to abdominal distention, nepro dense and more difficult to digest. Tolerating CPAP, confirmed by ABG.   10/10: BD 10. GEORGE overnight. Neuro stable. (+) gabriel for urinary retention on bladder scan. inc TF to goal rate of 40cc/hr. family leaning toward pursuing trach/PEG. 1/2 amp for FS 81.   10/11: BD 11. GEORGE overnight. Neuro stable. Trach/PEG consults placed.   10/12: BD 12. GEORGE overnight. Neuro stable. MRI brain complete.   10/13: BD 13. Increase flomax. Hold SQH after PM dose for trach tm. IVL.   10/14: BD 14. GEORGE overnight, remains on AC/VC. Gabriel placed for urinary retention. Dc'd free water.  S/p trach with pulm. NGT placed and CXR confirmed in good position.   10/15: BD 15, GEORGE ovn. resumed feeds. spiked 101, pan cx sent.   10/16: BD 16. GEORGE ovn. Lokelma 5mg for K+ 5.4. Started vanc q 24/zosyn for empiric PNA coverage, IVF to 100/hr. PEG held for fever.   10/17: BD 17,  ordered serum osm and urine osm for am. Started sinemet for neurostimulation. Increased cardura to 0.8. Started FW 100q4, dc'd IVF. MRSA negative, dc'd vanc. NGT replaced d/t coiling.   10/18: BD 18, GEORGE overnight, neuro stable. Amantadine added for neurostim. zosyn changed to unasyn for acinetobacter baumannii, failed TOV and required SC  10/19: BD 19, GEORGE ovn. cardura 2mg added for retention. labetalol decreased 200q8, hydralazine decreased 25q8. Gabriel replaced.   10/20: BD20, GEORGE overnight. NGT dislodged, replaced. PEG tomorrow w/ gen surg, FW increased to 150q4 and labetalol decreased to 100q8, lokelma given for hyperkalemia.   10/21: BD 21. POD0 PEG placement with Gen surg. decr labetolol to 50q8, incr. cardura to 0.4, started lokelma and phoslo, dc gabriel POD0 PEG placement with Gen surg.  10/22: BD 22. Plan to start TF today via PEG. dc labetalol, Following ophtho recs. Increased apnea settings - found to be in cheyne-moe respiration. CPAP 5/5.  10/23: BD 23. hydralazine d/c'd, trach collar trial today. Rectal tube placed at 6am.  10/24: BD24, o/n lokelma held due to diarrhea. Free water 100q6 resumed. dc'd tamsulosin, amantadine. Incr'd cardura to 8mg qhs. Dc'd FW. Switched jevity to nepro. gabriel placed for high urine output. Started SL atropine for oral secretions. Dc'd free water.  10/25: BD25, o/n decreased suctioning requirements to > q4hrs, GEORGE. Cr improving, cont phoslo, lokelma held at this time. Gabriel placed yest, cont. Tolerating trach collar. Given 500cc plasmalyte bolus for ANIKA. Dc'd sinemet.   10/26: BD26, o/n resumed lokelma 5mg daily and resumed 100cc free water q6hrs. Change in neuro status with new right pupillary dilation with anisocoria (right pupil 6mm fixed and left pupil 3mm briskly reactive). Given 23.4% NaCl bullet, taken for emergent CTH showing mostly resolved pontine hemorrhage, continued brainstem hypodensity likely edema d/t hemorrhage, no new hemorrhage or infarct, no herniation, mild increase in size of left lateral ventricle. Vitals remaine stable. Na goal > 140.   10/27: BD27, o/n GEORGE.Neuro stable. Pend stepdown with airway bed.   10/28: BD 28. GEORGE overnight. Neuro stable. Miralax ordered. Gabriel removed, pending TOV.  10/29: BD 29. GEORGE o/n. Given 2L NS over 8 hrs for increased BUN/Cr ratio. Gabriel placed for frequent straight cath.   10/30: BD 30.   10/31: BD 31. GEORGE overnight. Na 149, increased free water to 200q6. 1L NS for uptrending BUN.   11/1: BD 32. GEORGE overnight. Given 1L NS for dehydration. Na 146, increased FW to 250q6.   11/2: BD 33 GEORGE overnight, neuro stable, given 500cc bolus for net negative status and tachycardia   11/3: BD 34, GEORGE overnight, neuro stable. Patient remains tachycardic, EKG showing sinus tachycardia, given additional 500cc NS bolus. Febrile to 101.9F, pan cultured (without UA), CXR WNL, given tylenol.   11/4: BD 35, GEORGE overnight, neuro stable. Given 1L NS for tachycardia. sputum (+) for stenotropohomas maltophilia.   11/5: BD 36 GEORGE overnight, neuro stable. Vancomycin dc'd. Chest PT BID. ID consulted, cont zosyn.  11/6: BD 37. blood cx + klebsiella dc zosyn changed to cefepime, CTAP ordered, rpt blood cx sent.    11/7: BD 38. Pending CT A/P, given 250cc bolus and starting maintenance fluids overnight. Pending CT A/P after bolus   11/8: BD 39. CT CAP negative for infection.   11/9: BD 40. GEORGE overnight.  11/10: BD 41. GEORGE overnight. desat to 85 on trach collar, O2 inc to 10L and 100%, O2 sat inc to 95. pt tachy to 110s, euvolemic. given tylenol. ABG and CXR ordered. spiked fever, pancultured, RVP negative. AM ABG w pO2 79, rpt w pO2 79. pt appears comfortable, satting 94%.   11/11: BD 42. GEORGE overnight. pt became tachy to 130s, desat to 90 on 100% FiO2 and 10L. suctioned, (+) productive cough. temp 101.4, given 1g IV tylenol and 500cc NS bolus for euvolemia. fever and HR downtrending. LE dopplers negative for dvt  11/12: BD 43, GEORGE ovn, fever and HR downtrending, satting 97% 70% FIO2  11/13: BD 44, GEORGE ovn. started standing tylenol x24 hours for tachycardia. desat to 80s, o2 increased. CXR stable, pending CTA PE protocol.   11/14: BD 45, GEORGE overnight, neuro stable. resp therapy dec FiO2 to 70%.   11/15: BD 46, GEORGE overnight, neuro stable.  Rapid called for desaturation 30s, tachycardic 140s. Patient bagged, 100% fio2, heavily suctioned. CXR/POCUS unremarkable. ABG c/w desaturation. WBC 14.71. Afebrile. O2 improved to 90s and patient upgraded to ICU. ABG paO2 30s improved to 89 on vent. IV Tylenol x 1, sputum sent. Start protonix while o-n vent.   11/16: BD 47. POCUS showed collapsable IVCF, given 1L bolus. Vanco/Cefpime added empriic for PNA, NGT feeds restarted. MRSA swab neg, Vanc DC'd.   11/17: BD 48. GEORGE overnight. 1l bolus for tachycardia. Spiked to 101, cultured. 500cc bolus for tachycardia, tachy to 148 given 25mcg fentanyl, 250cc albumin, 1.5L bolus. 5 IV lopressor with response HR to 100s. +Stenotrophomonas on sputum cx.   11/18: BD 49. GEORGE overnight. Consulted ID, cefepime switched to bactrim x7days. Started hydrochlorothiazine 12.5mg daily.  11/19: BD 50. Tachy 120s, given tylenol and 500cc NS. Tolerating 5/5, switched to TCx. Holding phos binder. D/c Bactrim. D/c gabriel, f/u TOV. Dc'd PPI.   11/20: BD 51. GEORGE ovn. 1600 satting low 90s, mildly tachy to 110s, afebrile, RR wnl. O2 improved to mid 90s while inc O2 to 100% on TCx. CPAP 5/5 placed back on.  11/21: BD 52, GEORGE ovn, tolerating CPAP 5/5. Switch to trach collar during the day if tolerating well. HCTZ held for Cr bump, straight cath frequence increased to q4  11/22: BD 53, GEORGE ovn. Resumed phoslo. Gabriel placed. Resumed HCTZ.   11/23: BD 54. Holding tylenol in setting of possible fever, will require pan cx if febrile. Cr improved today. Cont CPAP. Bowel regimen held i/s/o diarrhea. FOBT negative.  11/24: BD 55. GEORGE overnight. Neuro stable. HCTZ dc'ed, started lisinopril 5. Lokelma dc'ed for K 3.7.   11/25: BD 56, GEORGE overnight. Neuro stable. dc'd lisinopril 5mg. Gabriel dc'd. TOV. 1545 noted to be hypotensive, MAP 50, in supine position on chair, HR 60s, afebrile, O2 96%. Given 1L cc NS bolus, placed back on bed in reverse trendelenberg, improved to map of 66. Neostick at bedside. Vitals check q1h. Dc'ed amlodipine. Failed TOV, bladder scan q6, sc prn. Added back Senna.   11/26: BD 57, GEORGE overnight. Neuro stable. Dc'd phoslo.   11/27: BD 58, GEORGE overnight. Neuro stable.    11/28: BD 59. Gabriel replaced.       Vital Signs Last 24 Hrs  T(C): 36.9 (27 Nov 2024 21:58), Max: 36.9 (27 Nov 2024 21:58)  T(F): 98.5 (27 Nov 2024 21:58), Max: 98.5 (27 Nov 2024 21:58)  HR: 86 (27 Nov 2024 23:43) (59 - 86)  BP: 118/76 (27 Nov 2024 23:43) (103/69 - 142/98)  BP(mean): 91 (27 Nov 2024 23:43) (80 - 115)  RR: 16 (27 Nov 2024 23:43) (16 - 18)  SpO2: 96% (27 Nov 2024 23:43) (95% - 100%)    Parameters below as of 27 Nov 2024 23:43  Patient On (Oxygen Delivery Method): tracheostomy collar  O2 Flow (L/min): 10  O2 Concentration (%): 40    I&O's Summary    26 Nov 2024 07:01  -  27 Nov 2024 07:00  --------------------------------------------------------  IN: 2140 mL / OUT: 1675 mL / NET: 465 mL    27 Nov 2024 07:01  -  28 Nov 2024 00:23  --------------------------------------------------------  IN: 1390 mL / OUT: 1100 mL / NET: 290 mL        PHYSICAL EXAM:  Constitutional: Lying in bed, in NAD  Respiratory: +Trach collar, breathing non-labored, symmetrical chest wall movement  Cardiovascular RRR, no murmurs  Gastrointestinal: +PEG, abdomen soft, non tender  Genitourinary: exam deferred  Neurological:  Opens eyes spontaneously, intermittently following commands. Awake and alert but not oriented.  Cranial Nerves: PERRL 3mm brisk, tracks vertically, facial movements appear symmetric, tongue midline. No verbal output.   Motor: LUE/LLE withdraws to noxious, RUE trace movement in fingers spontaneously, RLE wiggles toes to command.   Sensation: intact to light touch in all extremities      LABS:                        11.0   6.68  )-----------( 212      ( 27 Nov 2024 05:16 )             33.8     11-27    135  |  103  |  64[H]  ----------------------------<  109[H]  4.0   |  21[L]  |  1.33[H]    Ca    9.2      27 Nov 2024 05:16  Phos  3.9     11-27  Mg     2.1     11-27        Urinalysis Basic - ( 27 Nov 2024 05:16 )    Color: x / Appearance: x / SG: x / pH: x  Gluc: 109 mg/dL / Ketone: x  / Bili: x / Urobili: x   Blood: x / Protein: x / Nitrite: x   Leuk Esterase: x / RBC: x / WBC x   Sq Epi: x / Non Sq Epi: x / Bacteria: x          CAPILLARY BLOOD GLUCOSE          Drug Levels: [] N/A    CSF Analysis: [] N/A      Allergies    Allergy Status Unknown    Intolerances      MEDICATIONS:  Antibiotics:    Neuro:  acetaminophen   Oral Liquid .. 650 milliGRAM(s) Oral every 6 hours PRN    Anticoagulation:  heparin   Injectable 5000 Unit(s) SubCutaneous every 8 hours    OTHER:  acetylcysteine 10%  Inhalation 4 milliLiter(s) Inhalation every 8 hours  albuterol/ipratropium for Nebulization 3 milliLiter(s) Nebulizer every 8 hours  artificial tears (preservative free) Ophthalmic Solution 1 Drop(s) Right EYE every 4 hours  chlorhexidine 2% Cloths 1 Application(s) Topical <User Schedule>  doxazosin 8 milliGRAM(s) Oral at bedtime  petrolatum Ophthalmic Ointment 1 Application(s) Both EYES two times a day  senna 2 Tablet(s) Oral at bedtime    IVF:    CULTURES:  Culture Results:   Mixed gram negative rods including Moderate Stenotrophomonas maltophilia  Commensal iram consistent with body site (11-25 @ 00:19)  Culture Results:   No growth at 5 days (11-17 @ 05:59)    RADIOLOGY & ADDITIONAL TESTS:      ASSESSMENT:  45 y/o PMH ?stroke/MI present to [facility] after collapsing at work. Decorticate posturing, vomiting, intubated for airway protection. 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 Transferred to [facility] for further management. s/p trach 10/14. s/p peg 10/21. Re-upgrade to ICU 2/2 desaturation event and suctioning requirements 11/15.     PLAN:  Neuro:  - neuro/vitals q4h, protected sleep time 10PM-5AM  - pain control: tylenol prn  - vEEG (10/3-4)- negative, (10/17-10/19) - negative  - CTH 9/30: enlarged pontine hemorrhage, CTH 10/3: stable, CTH 10/25: mostly resolved pontine hemorrhage, no new hemorrhage or infarct, no herniation  - MRI brain 10/12: parenchymal hemorrhage, acute/subacute R cerebellar stroke    - stroke core measures, stroke neuro signed off    CV:  - -160  - HTN: holding amlodipine 10 mg, lisinopril 5 mg   - echo (9/30) EF 75%    Resp:  - trach collar   - Secretions: duonebs/mucomyst/chest PT q8h   - CTA chest 11/13 negative for PE, inc ground glass opacities    GI:  - TF via PEG (placed 10/21 by gen surg)  - bowel regimen, last BM 11/25  - CTAP 11/8 negative for infection    - FOBT negative 11/23    Renal:  - IVL  - FW flushes 250cc q6hrs for uptrending BUN/Cr  - Urinary retenion: Gabriel replaced 11/28  - CKD: trend BUN/Cr  - renal US 10/1: echogenicity c/w chronic med renal dz, repeat 10/8: inc renal echogeicity, c/f medical renal disease w increased hydro     Endo:  - A1c 5.4    Heme:  - DVT ppx: SCDs, SQH 5000u q8h   - LE dopplers negative 11/11    ID:  - febrile, last pancx 11/16, MRSA swab neg 11/16   -  s/p Stenotrophomonas maltophilia PNA: s/p Bactrim (11/18-11/19) s/p Cefepime (11/16-11/18)  - 11/3, (+) sputum for stenotrophomas maltophlia, blood cx (+) klebsiella, cefepime 2gq12 (11/6 - 11/12)   - empiric tx: s/p zosyn (11/3-11/6) , s/p vanc  (11/3- 11/5)  - S/p Ancef (10/4-10/14) for PNA, and s/p Unasyn (10/18-10/23) +actinobacter baumanii    MISC:  - ophtho consult for keratitis, s/p erythromycin ointment to rt eye q4hrs, ofloxacin ointment to rt eye QID, artificial tears to rt eye q2hrs, moisture chamber at bedtime    Dispo: tele status, full code, pending placement and guardianship hearing    D/w Dr. D'Amico

## 2024-11-29 LAB
ANION GAP SERPL CALC-SCNC: 13 MMOL/L — SIGNIFICANT CHANGE UP (ref 5–17)
BUN SERPL-MCNC: 58 MG/DL — HIGH (ref 7–23)
CALCIUM SERPL-MCNC: 9.2 MG/DL — SIGNIFICANT CHANGE UP (ref 8.4–10.5)
CHLORIDE SERPL-SCNC: 102 MMOL/L — SIGNIFICANT CHANGE UP (ref 96–108)
CO2 SERPL-SCNC: 21 MMOL/L — LOW (ref 22–31)
CREAT SERPL-MCNC: 1.26 MG/DL — SIGNIFICANT CHANGE UP (ref 0.5–1.3)
EGFR: 71 ML/MIN/1.73M2 — SIGNIFICANT CHANGE UP
EGFR: 71 ML/MIN/1.73M2 — SIGNIFICANT CHANGE UP
GLUCOSE SERPL-MCNC: 137 MG/DL — HIGH (ref 70–99)
HCT VFR BLD CALC: 33.9 % — LOW (ref 39–50)
HGB BLD-MCNC: 10.8 G/DL — LOW (ref 13–17)
MAGNESIUM SERPL-MCNC: 2 MG/DL — SIGNIFICANT CHANGE UP (ref 1.6–2.6)
MCHC RBC-ENTMCNC: 28.9 PG — SIGNIFICANT CHANGE UP (ref 27–34)
MCHC RBC-ENTMCNC: 31.9 G/DL — LOW (ref 32–36)
MCV RBC AUTO: 90.6 FL — SIGNIFICANT CHANGE UP (ref 80–100)
NRBC # BLD: 0 /100 WBCS — SIGNIFICANT CHANGE UP (ref 0–0)
NRBC BLD-RTO: 0 /100 WBCS — SIGNIFICANT CHANGE UP (ref 0–0)
PHOSPHATE SERPL-MCNC: 2.8 MG/DL — SIGNIFICANT CHANGE UP (ref 2.5–4.5)
PLATELET # BLD AUTO: 236 K/UL — SIGNIFICANT CHANGE UP (ref 150–400)
POTASSIUM SERPL-MCNC: 3.5 MMOL/L — SIGNIFICANT CHANGE UP (ref 3.5–5.3)
POTASSIUM SERPL-SCNC: 3.5 MMOL/L — SIGNIFICANT CHANGE UP (ref 3.5–5.3)
RBC # BLD: 3.74 M/UL — LOW (ref 4.2–5.8)
RBC # FLD: 14.4 % — SIGNIFICANT CHANGE UP (ref 10.3–14.5)
SODIUM SERPL-SCNC: 136 MMOL/L — SIGNIFICANT CHANGE UP (ref 135–145)
WBC # BLD: 7.94 K/UL — SIGNIFICANT CHANGE UP (ref 3.8–10.5)
WBC # FLD AUTO: 7.94 K/UL — SIGNIFICANT CHANGE UP (ref 3.8–10.5)

## 2024-11-29 PROCEDURE — 99232 SBSQ HOSP IP/OBS MODERATE 35: CPT

## 2024-11-29 RX ORDER — SOD PHOS DI, MONO/K PHOS MONO 250 MG
1 TABLET ORAL ONCE
Refills: 0 | Status: COMPLETED | OUTPATIENT
Start: 2024-11-29 | End: 2024-11-29

## 2024-11-29 RX ADMIN — Medication 650 MILLIGRAM(S): at 22:17

## 2024-11-29 RX ADMIN — Medication 2 TABLET(S): at 21:29

## 2024-11-29 RX ADMIN — HEPARIN SODIUM 5000 UNIT(S): 1000 INJECTION INTRAVENOUS; SUBCUTANEOUS at 21:30

## 2024-11-29 RX ADMIN — Medication 40 MILLIEQUIVALENT(S): at 09:55

## 2024-11-29 RX ADMIN — Medication 1 PACKET(S): at 09:55

## 2024-11-29 RX ADMIN — IPRATROPIUM BROMIDE AND ALBUTEROL SULFATE 3 MILLILITER(S): .5; 2.5 SOLUTION RESPIRATORY (INHALATION) at 14:00

## 2024-11-29 RX ADMIN — IPRATROPIUM BROMIDE AND ALBUTEROL SULFATE 3 MILLILITER(S): .5; 2.5 SOLUTION RESPIRATORY (INHALATION) at 21:30

## 2024-11-29 RX ADMIN — Medication 1 DROP(S): at 05:26

## 2024-11-29 RX ADMIN — Medication 1 DROP(S): at 09:53

## 2024-11-29 RX ADMIN — HEPARIN SODIUM 5000 UNIT(S): 1000 INJECTION INTRAVENOUS; SUBCUTANEOUS at 14:00

## 2024-11-29 RX ADMIN — Medication 1 DROP(S): at 17:40

## 2024-11-29 RX ADMIN — Medication 1 DROP(S): at 01:12

## 2024-11-29 RX ADMIN — DOXAZOSIN MESYLATE 8 MILLIGRAM(S): 8 TABLET ORAL at 21:30

## 2024-11-29 RX ADMIN — ACETYLCYSTEINE 4 MILLILITER(S): 200 INHALANT RESPIRATORY (INHALATION) at 21:30

## 2024-11-29 RX ADMIN — Medication 650 MILLIGRAM(S): at 22:29

## 2024-11-29 RX ADMIN — Medication 1 DROP(S): at 13:59

## 2024-11-29 RX ADMIN — Medication 1 APPLICATION(S): at 06:05

## 2024-11-29 RX ADMIN — ACETYLCYSTEINE 4 MILLILITER(S): 200 INHALANT RESPIRATORY (INHALATION) at 05:25

## 2024-11-29 RX ADMIN — ACETYLCYSTEINE 4 MILLILITER(S): 200 INHALANT RESPIRATORY (INHALATION) at 14:00

## 2024-11-29 RX ADMIN — IPRATROPIUM BROMIDE AND ALBUTEROL SULFATE 3 MILLILITER(S): .5; 2.5 SOLUTION RESPIRATORY (INHALATION) at 05:26

## 2024-11-29 RX ADMIN — HEPARIN SODIUM 5000 UNIT(S): 1000 INJECTION INTRAVENOUS; SUBCUTANEOUS at 05:25

## 2024-11-29 RX ADMIN — Medication 1 DROP(S): at 21:31

## 2024-11-29 NOTE — PROGRESS NOTE ADULT - ASSESSMENT
44 YOF with unclear PMH (?stroke, MI) who was found down at work, intubated for airway protection and found to have acute parenchymal hemorrhage within nabil with mass effect (+ acute/subacute right cerebellar infarct) in setting of hypertensive emergency, transferred to [facility] for further neurosurgical care. Hospital course c/b poor neurologic recovery s/p trach-PEG, AUR s/p gabriel, ANIKA on CKD c/b hyperkalemia, HAP s/p amp-sulbactam (EOT 10/23), K aerogenes bacteremia.       # Gram-negative bacteremia.   # Fever ? LLL pneumonia   - Pt's blood cx on 11/3 positive K aerogenes  - Source of bacteremia suspected  however there is no urine cx data given chronic gabriel (s/p exchanged 11/7)  - Pt's surveillance blood cx sent on 11/6 was NGTD   - PEr ID note from 11/7 at least 7 days of IV cefepime from 11/6-11/13    - Sputum cx - Common Resp Commensals   - CTA Negative for PE , However with Evidence of Thick Secretions in Right Bronchus and Trachea   - Elevate HOB to 45 Degrees , Check Residuals , If residuals are elevated will need to decrease rate of Tube feeds or transition to bolus feeds     # Pontine hemorrhage.   -The pt was found to have acute parenchymal hemorrhage within nabil with mass effect (+ acute/subacute right cerebellar infarct) in setting of hypertensive emergency.   -The pt had an MRI brain also demonstrated acute/subacute R cerebellar stroke.   - Will continue management per NSGY    # Hypertension  -Will continue amlodipine 10mg daily     #Acute kidney injury superimposed on CKD.   -Pt s/p US renal with chronic medical renal disease. Hospital course c/b ATN.   - Will continue to renally dose medications and avoid nephrotoxins      #Hyperphosphatemia   -Pt  had hyperphosphatemia now on phoslo 776 mg TID now within normal limits will continue to monitor.     # Chronic respiratory failure.   -Pt s/p percutaneous trach by pulm on 10/14/24  - Will continue  trach collar   - Will continue routine trach care and suctioning PRN      # Neurogenic dysphagia.   -Pt s/pPEG with surgery 10/21/24  - TF per nutrition  - aspiration precautions and elevation of HOB.    #Acute urinary retention.   -Pt s/p gabriel placement  10/24 for urinary retention, failed TOV 10/27.   - doxazosin 8mg.    # Functional quadriplegia in  setting of brainstem hemorrhage  - decub precautions.    #DVT prop  -Continue Heparin SQ q8     #DISPO  -TBD - pending PRUCOL application     44 YOF with unclear PMH (?stroke, MI) who was found down at work, intubated for airway protection and found to have acute parenchymal hemorrhage within nabil with mass effect (+ acute/subacute right cerebellar infarct) in setting of hypertensive emergency, transferred to [facility] for further neurosurgical care. Hospital course c/b poor neurologic recovery s/p trach-PEG, AUR s/p gabriel, ANIKA on CKD c/b hyperkalemia, HAP s/p amp-sulbactam (EOT 10/23), K aerogenes bacte    # Pontine hemorrhage.   -The pt was found to have acute parenchymal hemorrhage within nabil with mass effect (+ acute/subacute right cerebellar infarct) in setting of hypertensive emergency.   -The pt had an MRI brain also demonstrated acute/subacute R cerebellar stroke.   - Will continue management per NSGY    # Hypertension  -Will continue amlodipine 10mg daily     #Acute kidney injury superimposed on CKD.   -Pt s/p US renal with chronic medical renal disease. Hospital course c/b ATN.   - Will continue to renally dose medications and avoid nephrotoxins      # Chronic respiratory failure.   -Pt s/p percutaneous trach by pulm on 10/14/24  - Continue  trach collar   - Continue routine trach care and suctioning PRN  - Chest PT      # Neurogenic dysphagia.   -Pt s/pPEG with surgery 10/21/24  - TF per nutrition  - aspiration precautions and elevation of HOB.    #Acute urinary retention.   -Pt s/p gabriel placement  10/24 for urinary retention, failed TOV   - doxazosin 8mg.    # Functional quadriplegia in  setting of brainstem hemorrhage  - decub precautions  -care per nursing protocol     #DVT prop  -Continue Heparin SQ q8     #DISPO  -TBD - pending PRUCOL application

## 2024-11-29 NOTE — PROGRESS NOTE ADULT - SUBJECTIVE AND OBJECTIVE BOX
HPI:  Unknown age male, unknown past medical history (reported stroke and MI by coworkers) presented to [facility] with AMS, Pt was working at [employer] when he was found down by coworkers. EMS called and pt brought to [facility] ED. Intubated, sedated, started on cardene for SBPs in 200s. CT head showed brain stem bleed. Transferred to NSICU for further management.  (30 Sep 2024 12:55)    INTERVAL EVENTS:  GEORGE o/n    HOSPITAL COURSE:  11/1: BD 32. GEORGE overnight. Given 1L NS for dehydration. Na 146, increased FW to 250q6.   11/2: BD 33 GEORGE overnight, neuro stable, given 500cc bolus for net negative status and tachycardia   11/3: BD 34, GEORGE overnight, neuro stable. Patient remains tachycardic, EKG showing sinus tachycardia, given additional 500cc NS bolus. Febrile to 101.9F, pan cultured (without UA), CXR WNL, given tylenol.   11/4: BD 35, GEORGE overnight, neuro stable. Given 1L NS for tachycardia. sputum (+) for stenotropohomas maltophilia.   11/5: BD 36 GEORGE overnight, neuro stable. Vancomycin dc'd. Chest PT BID. ID consulted, cont zosyn.  11/6: BD 37. blood cx + klebsiella dc zosyn changed to cefepime, CTAP ordered, rpt blood cx sent.    11/7: BD 38. Pending CT A/P, given 250cc bolus and starting maintenance fluids overnight. Pending CT A/P after bolus   11/8: BD 39. CT CAP negative for infection.   11/9: BD 40. GEORGE overnight.  11/10: BD 41. GEORGE overnight. desat to 85 on trach collar, O2 inc to 10L and 100%, O2 sat inc to 95. pt tachy to 110s, euvolemic. given tylenol. ABG and CXR ordered. spiked fever, pancultured, RVP negative. AM ABG w pO2 79, rpt w pO2 79. pt appears comfortable, satting 94%.   11/11: BD 42. GEORGE overnight. pt became tachy to 130s, desat to 90 on 100% FiO2 and 10L. suctioned, (+) productive cough. temp 101.4, given 1g IV tylenol and 500cc NS bolus for euvolemia. fever and HR downtrending. LE dopplers negative for dvt  11/12: BD 43, GEORGE ovn, fever and HR downtrending, satting 97% 70% FIO2  11/13: BD 44, GEORGE ovn. started standing tylenol x24 hours for tachycardia. desat to 80s, o2 increased. CXR stable, pending CTA PE protocol.   11/14: BD 45, GEORGE overnight, neuro stable. resp therapy dec FiO2 to 70%.   11/15: BD 46, GEORGE overnight, neuro stable.  Rapid called for desaturation 30s, tachycardic 140s. Patient bagged, 100% fio2, heavily suctioned. CXR/POCUS unremarkable. ABG c/w desaturation. WBC 14.71. Afebrile. O2 improved to 90s and patient upgraded to ICU. ABG paO2 30s improved to 89 on vent. IV Tylenol x 1, sputum sent. Start protonix while o-n vent.   11/16: BD 47. POCUS showed collapsable IVCF, given 1L bolus. Vanco/Cefpime added empriic for PNA, NGT feeds restarted. MRSA swab neg, Vanc DC'd.   11/17: BD 48. GEORGE overnight. 1l bolus for tachycardia. Spiked to 101, cultured. 500cc bolus for tachycardia, tachy to 148 given 25mcg fentanyl, 250cc albumin, 1.5L bolus. 5 IV lopressor with response HR to 100s. +Stenotrophomonas on sputum cx.   11/18: BD 49. GEORGE overnight. Consulted ID, cefepime switched to bactrim x7days. Started hydrochlorothiazine 12.5mg daily.  11/19: BD 50. Tachy 120s, given tylenol and 500cc NS. Tolerating 5/5, switched to TCx. Holding phos binder. D/c Bactrim. D/c nery, f/u TOV. Dc'd PPI.   11/20: BD 51. GEORGE ovn. 1600 satting low 90s, mildly tachy to 110s, afebrile, RR wnl. O2 improved to mid 90s while inc O2 to 100% on TCx. CPAP 5/5 placed back on.  11/21: BD 52, GEORGE ovn, tolerating CPAP 5/5. Switch to trach collar during the day if tolerating well. HCTZ held for Cr bump, straight cath frequence increased to q4  11/22: BD 53, GEORGE ovn. Resumed phoslo. Vuong placed. Resumed HCTZ.   11/23: BD 54. Holding tylenol in setting of possible fever, will require pan cx if febrile. Cr improved today. Cont CPAP. Bowel regimen held i/s/o diarrhea. FOBT negative.  11/24: BD 55. GEORGE overnight. Neuro stable. HCTZ dc'ed, started lisinopril 5. Lokelma dc'ed for K 3.7.   11/25: BD 56, GEORGE overnight. Neuro stable. dc'd lisinopril 5mg. Vuong dc'd. TOV. 1545 noted to be hypotensive, MAP 50, in supine position on chair, HR 60s, afebrile, O2 96%. Given 1L cc NS bolus, placed back on bed in reverse trendelenberg, improved to map of 66. Neostick at bedside. Vitals check q1h. Dc'ed amlodipine. Failed TOV, bladder scan q6, sc prn. Added back Senna.   11/26: BD 57, GEORGE overnight. Neuro stable. Dc'd phoslo.   11/27: BD 58, GEORGE overnight. Neuro stable.    11/28: BD 59. Vuong replaced.   11/29: GEORGE      Vital Signs Last 24 Hrs  T(C): 36.8 (28 Nov 2024 22:11), Max: 37.3 (28 Nov 2024 08:57)  T(F): 98.3 (28 Nov 2024 22:11), Max: 99.1 (28 Nov 2024 08:57)  HR: 84 (28 Nov 2024 23:45) (72 - 90)  BP: 120/84 (28 Nov 2024 23:45) (109/71 - 129/89)  BP(mean): 97 (28 Nov 2024 23:45) (85 - 103)  RR: 18 (28 Nov 2024 23:45) (16 - 19)  SpO2: 95% (28 Nov 2024 23:45) (94% - 98%)    Parameters below as of 28 Nov 2024 23:45  Patient On (Oxygen Delivery Method): tracheostomy collar  O2 Flow (L/min): 10  O2 Concentration (%): 40    I&O's Summary    27 Nov 2024 07:01  -  28 Nov 2024 07:00  --------------------------------------------------------  IN: 1390 mL / OUT: 1450 mL / NET: -60 mL    28 Nov 2024 07:01  -  29 Nov 2024 00:09  --------------------------------------------------------  IN: 775 mL / OUT: 1175 mL / NET: -400 mL        PHYSICAL EXAM:  Constitutional: Lying in bed, in NAD  Respiratory: +Trach collar, breathing non-labored, symmetrical chest wall movement  Cardiovascular RRR, no murmurs  Gastrointestinal: +PEG, abdomen soft, non tender  Genitourinary: exam deferred  Neurological:  Opens eyes spontaneously, intermittently following commands. Awake and alert but not oriented.  Cranial Nerves: PERRL 3mm brisk, tracks vertically, facial movements appear symmetric, tongue midline. No verbal output.   Motor: LUE/LLE withdraws to noxious, RUE trace movement in fingers spontaneously, RLE wiggles toes to command.       LABS:                        11.0   6.68  )-----------( 212      ( 27 Nov 2024 05:16 )             33.8     11-27    135  |  103  |  64[H]  ----------------------------<  109[H]  4.0   |  21[L]  |  1.33[H]    Ca    9.2      27 Nov 2024 05:16  Phos  3.9     11-27  Mg     2.1     11-27        Urinalysis Basic - ( 27 Nov 2024 05:16 )    Color: x / Appearance: x / SG: x / pH: x  Gluc: 109 mg/dL / Ketone: x  / Bili: x / Urobili: x   Blood: x / Protein: x / Nitrite: x   Leuk Esterase: x / RBC: x / WBC x   Sq Epi: x / Non Sq Epi: x / Bacteria: x          CAPILLARY BLOOD GLUCOSE          Drug Levels: [] N/A    CSF Analysis: [] N/A      Allergies    Allergy Status Unknown    Intolerances      MEDICATIONS:  Antibiotics:    Neuro:  acetaminophen   Oral Liquid .. 650 milliGRAM(s) Oral every 6 hours PRN    Anticoagulation:  heparin   Injectable 5000 Unit(s) SubCutaneous every 8 hours    OTHER:  acetylcysteine 10%  Inhalation 4 milliLiter(s) Inhalation every 8 hours  albuterol/ipratropium for Nebulization 3 milliLiter(s) Nebulizer every 8 hours  artificial tears (preservative free) Ophthalmic Solution 1 Drop(s) Right EYE every 4 hours  chlorhexidine 2% Cloths 1 Application(s) Topical <User Schedule>  doxazosin 8 milliGRAM(s) Oral at bedtime  petrolatum Ophthalmic Ointment 1 Application(s) Both EYES two times a day  senna 2 Tablet(s) Oral at bedtime    IVF:    CULTURES:  Culture Results:   Mixed gram negative rods including Moderate Stenotrophomonas maltophilia  Commensal iram consistent with body site (11-25 @ 00:19)  Culture Results:   No growth at 5 days (11-17 @ 05:59)    RADIOLOGY & ADDITIONAL TESTS:      ASSESSMENT:  43 y/o PMH ?stroke/MI present to [facility] after collapsing at work. Decorticate posturing, vomiting, intubated for airway protection. 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 Transferred [facility] for further management. s/p trach 10/14. s/p peg 10/21. Re-upgrade to ICU 2/2 desaturation event and suctioning requirements 11/15.     PLAN:  Neuro:  - neuro/vitals q4h, protected sleep time 10PM-5AM  - pain control: tylenol prn  - vEEG (10/3-4)- negative, (10/17-10/19) - negative  - CTH 9/30: enlarged pontine hemorrhage, CTH 10/3: stable, CTH 10/25: mostly resolved pontine hemorrhage, no new hemorrhage or infarct, no herniation  - MRI brain 10/12: parenchymal hemorrhage, acute/subacute R cerebellar stroke    - stroke core measures, stroke neuro signed off    CV:  - -160  - HTN: holding amlodipine 10 mg, lisinopril 5 mg   - echo (9/30) EF 75%    Resp:  - trach collar   - Secretions: duonebs/mucomyst/chest PT q8h   - CTA chest 11/13 negative for PE, inc ground glass opacities    GI:  - TF via PEG (placed 10/21 by gen surg)  - bowel regimen, last BM 11/25  - CTAP 11/8 negative for infection    - FOBT negative 11/23    Renal:  - IVL  - FW flushes 250cc q6hrs for uptrending BUN/Cr  - Urinary retenion: Vuong replaced 11/28  - CKD: trend BUN/Cr  - renal US 10/1: echogenicity c/w chronic med renal dz, repeat 10/8: inc renal echogeicity, c/f medical renal disease w increased hydro     Endo:  - A1c 5.4    Heme:  - DVT ppx: SCDs, SQH 5000u q8h   - LE dopplers negative 11/11    ID:  - febrile, last pancx 11/16, MRSA swab neg 11/16   -  s/p Stenotrophomonas maltophilia PNA: s/p Bactrim (11/18-11/19) s/p Cefepime (11/16-11/18)  - 11/3, (+) sputum for stenotrophomas maltophlia, blood cx (+) klebsiella, cefepime 2gq12 (11/6 - 11/12)   - empiric tx: s/p zosyn (11/3-11/6) , s/p vanc  (11/3- 11/5)  - S/p Ancef (10/4-10/14) for PNA, and s/p Unasyn (10/18-10/23) +actinobacter baumanii    MISC:  - ophtho consult for keratitis, s/p erythromycin ointment to rt eye q4hrs, ofloxacin ointment to rt eye QID, artificial tears to rt eye q2hrs, moisture chamber at bedtime    Dispo: tele status, full code, pending placement and guardianship hearing    D/w Dr. D'Amico

## 2024-11-29 NOTE — PROGRESS NOTE ADULT - SUBJECTIVE AND OBJECTIVE BOX
Patient is a 44y old  Male who presents with a chief complaint of brain stem bleed (29 Nov 2024 00:08)    INTERVAL EVENTS:    SUBJECTIVE:  Patient was seen and examined at bedside.    Review of systems: No fever, chills, dizziness, HA, Changes in vision, CP, dyspnea, nausea or vomiting, dysuria, changes in bowel movements, LE edema. Rest of 12 point Review of systems negative unless otherwise documented elsewhere in note.     Diet, NPO with Tube Feed:   Tube Feeding Modality: Gastrostomy  Maribeth Mocoplex Renal Support 1.8  Total Volume for 24 Hours (mL): 1080  Total Number of Cans: 5  Continuous  Starting Tube Feed Rate mL per Hour: 45  Increase Tube Feed Rate by (mL): 10     Every 4 hours  Until Goal Tube Feed Rate (mL per Hour): 60  Tube Feed Duration (in Hours): 18  Tube Feed Start Time: 10:00  Tube Feed Stop Time: 04:00  Banatrol TF     Qty per Day:  2 (11-20-24 @ 10:26) [Active]      MEDICATIONS:  MEDICATIONS  (STANDING):  acetylcysteine 10%  Inhalation 4 milliLiter(s) Inhalation every 8 hours  albuterol/ipratropium for Nebulization 3 milliLiter(s) Nebulizer every 8 hours  artificial tears (preservative free) Ophthalmic Solution 1 Drop(s) Right EYE every 4 hours  chlorhexidine 2% Cloths 1 Application(s) Topical <User Schedule>  doxazosin 8 milliGRAM(s) Oral at bedtime  heparin   Injectable 5000 Unit(s) SubCutaneous every 8 hours  petrolatum Ophthalmic Ointment 1 Application(s) Both EYES two times a day  senna 2 Tablet(s) Oral at bedtime    MEDICATIONS  (PRN):  acetaminophen   Oral Liquid .. 650 milliGRAM(s) Oral every 6 hours PRN Temp greater or equal to 38.5C (101.3F), Mild Pain (1 - 3)      Allergies    Allergy Status Unknown    Intolerances        OBJECTIVE:  Vital Signs Last 24 Hrs  T(C): 36.9 (29 Nov 2024 11:00), Max: 37.1 (28 Nov 2024 14:14)  T(F): 98.4 (29 Nov 2024 11:00), Max: 98.8 (28 Nov 2024 14:14)  HR: 70 (29 Nov 2024 09:25) (70 - 86)  BP: 128/94 (29 Nov 2024 08:46) (109/71 - 131/84)  BP(mean): 106 (29 Nov 2024 08:46) (85 - 106)  RR: 16 (29 Nov 2024 09:25) (16 - 19)  SpO2: 94% (29 Nov 2024 09:25) (94% - 98%)    Parameters below as of 29 Nov 2024 09:25  Patient On (Oxygen Delivery Method): tracheostomy collar  O2 Flow (L/min): 10  O2 Concentration (%): 40  I&O's Summary    28 Nov 2024 07:01  -  29 Nov 2024 07:00  --------------------------------------------------------  IN: 775 mL / OUT: 1725 mL / NET: -950 mL    29 Nov 2024 07:01  -  29 Nov 2024 11:39  --------------------------------------------------------  IN: 0 mL / OUT: 175 mL / NET: -175 mL        PHYSICAL EXAM:  Gen: Reclining in bed at time of exam, appears stated age  HEENT: NCAT, MMM, clear OP  Neck: supple, trachea at midline  CV: RRR, +S1/S2  Pulm: adequate respiratory effort, no increase in work of breathing  Abd: soft, NTND  Skin: warm and dry,   Ext: WWP, no LE edema  Neuro: AOx3, speaking in full sentences  Psych: affect and behavior appropriate, pleasant at time of interview    LABS:                        10.8   7.94  )-----------( 236      ( 29 Nov 2024 06:17 )             33.9     11-29    136  |  102  |  58[H]  ----------------------------<  137[H]  3.5   |  21[L]  |  1.26    Ca    9.2      29 Nov 2024 06:17  Phos  2.8     11-29  Mg     2.0     11-29          CAPILLARY BLOOD GLUCOSE        Urinalysis Basic - ( 29 Nov 2024 06:17 )    Color: x / Appearance: x / SG: x / pH: x  Gluc: 137 mg/dL / Ketone: x  / Bili: x / Urobili: x   Blood: x / Protein: x / Nitrite: x   Leuk Esterase: x / RBC: x / WBC x   Sq Epi: x / Non Sq Epi: x / Bacteria: x        MICRODATA:      RADIOLOGY/OTHER STUDIES:   SUBJECTIVE:  Patient was seen and examined at bedside. Looking comfortable, awake, not tracking, no labored breathing on Trach collar. Telemetry reviewed    Review of systems: unable to obtain     Diet, NPO with Tube Feed:   Tube Feeding Modality: Gastrostomy  Maribeth Sauceda Renal Support 1.8  Total Volume for 24 Hours (mL): 1080  Total Number of Cans: 5  Continuous  Starting Tube Feed Rate mL per Hour: 45  Increase Tube Feed Rate by (mL): 10     Every 4 hours  Until Goal Tube Feed Rate (mL per Hour): 60  Tube Feed Duration (in Hours): 18  Tube Feed Start Time: 10:00  Tube Feed Stop Time: 04:00  Banatrol TF     Qty per Day:  2 (11-20-24 @ 10:26) [Active]      MEDICATIONS:  MEDICATIONS  (STANDING):  acetylcysteine 10%  Inhalation 4 milliLiter(s) Inhalation every 8 hours  albuterol/ipratropium for Nebulization 3 milliLiter(s) Nebulizer every 8 hours  artificial tears (preservative free) Ophthalmic Solution 1 Drop(s) Right EYE every 4 hours  chlorhexidine 2% Cloths 1 Application(s) Topical <User Schedule>  doxazosin 8 milliGRAM(s) Oral at bedtime  heparin   Injectable 5000 Unit(s) SubCutaneous every 8 hours  petrolatum Ophthalmic Ointment 1 Application(s) Both EYES two times a day  senna 2 Tablet(s) Oral at bedtime    MEDICATIONS  (PRN):  acetaminophen   Oral Liquid .. 650 milliGRAM(s) Oral every 6 hours PRN Temp greater or equal to 38.5C (101.3F), Mild Pain (1 - 3)      Allergies    Allergy Status Unknown    Intolerances        OBJECTIVE:  Vital Signs Last 24 Hrs  T(C): 36.9 (29 Nov 2024 11:00), Max: 37.1 (28 Nov 2024 14:14)  T(F): 98.4 (29 Nov 2024 11:00), Max: 98.8 (28 Nov 2024 14:14)  HR: 70 (29 Nov 2024 09:25) (70 - 86)  BP: 128/94 (29 Nov 2024 08:46) (109/71 - 131/84)  BP(mean): 106 (29 Nov 2024 08:46) (85 - 106)  RR: 16 (29 Nov 2024 09:25) (16 - 19)  SpO2: 94% (29 Nov 2024 09:25) (94% - 98%)    Parameters below as of 29 Nov 2024 09:25  Patient On (Oxygen Delivery Method): tracheostomy collar  O2 Flow (L/min): 10  O2 Concentration (%): 40  I&O's Summary    28 Nov 2024 07:01  -  29 Nov 2024 07:00  --------------------------------------------------------  IN: 775 mL / OUT: 1725 mL / NET: -950 mL    29 Nov 2024 07:01  -  29 Nov 2024 11:39  --------------------------------------------------------  IN: 0 mL / OUT: 175 mL / NET: -175 mL        PHYSICAL EXAM:  General: awake, not tracking, looking comfortable, no labored breathing on trach collar  HEENT: tracheostomy clean  Lungs: poor inspiration, no crackles, no wheezes  Heart: regular  Abdomen: soft, no visible tenderness, + BS  Extremities: warm, no edema, not following commands    LABS:                        10.8   7.94  )-----------( 236      ( 29 Nov 2024 06:17 )             33.9     11-29    136  |  102  |  58[H]  ----------------------------<  137[H]  3.5   |  21[L]  |  1.26    Ca    9.2      29 Nov 2024 06:17  Phos  2.8     11-29  Mg     2.0     11-29          CAPILLARY BLOOD GLUCOSE        Urinalysis Basic - ( 29 Nov 2024 06:17 )    Color: x / Appearance: x / SG: x / pH: x  Gluc: 137 mg/dL / Ketone: x  / Bili: x / Urobili: x   Blood: x / Protein: x / Nitrite: x   Leuk Esterase: x / RBC: x / WBC x   Sq Epi: x / Non Sq Epi: x / Bacteria: x        MICRODATA:      RADIOLOGY/OTHER STUDIES:

## 2024-11-30 LAB
ANION GAP SERPL CALC-SCNC: 12 MMOL/L — SIGNIFICANT CHANGE UP (ref 5–17)
BUN SERPL-MCNC: 62 MG/DL — HIGH (ref 7–23)
CALCIUM SERPL-MCNC: 9.3 MG/DL — SIGNIFICANT CHANGE UP (ref 8.4–10.5)
CHLORIDE SERPL-SCNC: 103 MMOL/L — SIGNIFICANT CHANGE UP (ref 96–108)
CO2 SERPL-SCNC: 23 MMOL/L — SIGNIFICANT CHANGE UP (ref 22–31)
CREAT SERPL-MCNC: 1.29 MG/DL — SIGNIFICANT CHANGE UP (ref 0.5–1.3)
EGFR: 69 ML/MIN/1.73M2 — SIGNIFICANT CHANGE UP
EGFR: 69 ML/MIN/1.73M2 — SIGNIFICANT CHANGE UP
GLUCOSE SERPL-MCNC: 127 MG/DL — HIGH (ref 70–99)
HCT VFR BLD CALC: 35.4 % — LOW (ref 39–50)
HGB BLD-MCNC: 11.4 G/DL — LOW (ref 13–17)
MAGNESIUM SERPL-MCNC: 2.1 MG/DL — SIGNIFICANT CHANGE UP (ref 1.6–2.6)
MCHC RBC-ENTMCNC: 30.2 PG — SIGNIFICANT CHANGE UP (ref 27–34)
MCHC RBC-ENTMCNC: 32.2 G/DL — SIGNIFICANT CHANGE UP (ref 32–36)
MCV RBC AUTO: 93.9 FL — SIGNIFICANT CHANGE UP (ref 80–100)
NRBC # BLD: 0 /100 WBCS — SIGNIFICANT CHANGE UP (ref 0–0)
NRBC BLD-RTO: 0 /100 WBCS — SIGNIFICANT CHANGE UP (ref 0–0)
PHOSPHATE SERPL-MCNC: 3.8 MG/DL — SIGNIFICANT CHANGE UP (ref 2.5–4.5)
PLATELET # BLD AUTO: 217 K/UL — SIGNIFICANT CHANGE UP (ref 150–400)
POTASSIUM SERPL-MCNC: 3.9 MMOL/L — SIGNIFICANT CHANGE UP (ref 3.5–5.3)
POTASSIUM SERPL-SCNC: 3.9 MMOL/L — SIGNIFICANT CHANGE UP (ref 3.5–5.3)
RBC # BLD: 3.77 M/UL — LOW (ref 4.2–5.8)
RBC # FLD: 14.8 % — HIGH (ref 10.3–14.5)
SODIUM SERPL-SCNC: 138 MMOL/L — SIGNIFICANT CHANGE UP (ref 135–145)
WBC # BLD: 8.09 K/UL — SIGNIFICANT CHANGE UP (ref 3.8–10.5)
WBC # FLD AUTO: 8.09 K/UL — SIGNIFICANT CHANGE UP (ref 3.8–10.5)

## 2024-11-30 PROCEDURE — 99232 SBSQ HOSP IP/OBS MODERATE 35: CPT

## 2024-11-30 PROCEDURE — 99233 SBSQ HOSP IP/OBS HIGH 50: CPT

## 2024-11-30 RX ADMIN — Medication 2 TABLET(S): at 21:49

## 2024-11-30 RX ADMIN — HEPARIN SODIUM 5000 UNIT(S): 1000 INJECTION INTRAVENOUS; SUBCUTANEOUS at 05:49

## 2024-11-30 RX ADMIN — Medication 1 APPLICATION(S): at 07:06

## 2024-11-30 RX ADMIN — Medication 1 DROP(S): at 21:50

## 2024-11-30 RX ADMIN — IPRATROPIUM BROMIDE AND ALBUTEROL SULFATE 3 MILLILITER(S): .5; 2.5 SOLUTION RESPIRATORY (INHALATION) at 21:49

## 2024-11-30 RX ADMIN — IPRATROPIUM BROMIDE AND ALBUTEROL SULFATE 3 MILLILITER(S): .5; 2.5 SOLUTION RESPIRATORY (INHALATION) at 04:34

## 2024-11-30 RX ADMIN — Medication 1 DROP(S): at 17:35

## 2024-11-30 RX ADMIN — Medication 1 DROP(S): at 14:07

## 2024-11-30 RX ADMIN — IPRATROPIUM BROMIDE AND ALBUTEROL SULFATE 3 MILLILITER(S): .5; 2.5 SOLUTION RESPIRATORY (INHALATION) at 14:07

## 2024-11-30 RX ADMIN — Medication 1 APPLICATION(S): at 17:36

## 2024-11-30 RX ADMIN — ACETYLCYSTEINE 4 MILLILITER(S): 200 INHALANT RESPIRATORY (INHALATION) at 14:07

## 2024-11-30 RX ADMIN — Medication 1 DROP(S): at 01:28

## 2024-11-30 RX ADMIN — DOXAZOSIN MESYLATE 8 MILLIGRAM(S): 8 TABLET ORAL at 21:49

## 2024-11-30 RX ADMIN — ACETYLCYSTEINE 4 MILLILITER(S): 200 INHALANT RESPIRATORY (INHALATION) at 04:34

## 2024-11-30 RX ADMIN — ACETYLCYSTEINE 4 MILLILITER(S): 200 INHALANT RESPIRATORY (INHALATION) at 21:49

## 2024-11-30 RX ADMIN — Medication 1 DROP(S): at 05:49

## 2024-11-30 RX ADMIN — HEPARIN SODIUM 5000 UNIT(S): 1000 INJECTION INTRAVENOUS; SUBCUTANEOUS at 14:07

## 2024-11-30 RX ADMIN — Medication 1 APPLICATION(S): at 05:49

## 2024-11-30 RX ADMIN — Medication 1 DROP(S): at 09:56

## 2024-11-30 RX ADMIN — HEPARIN SODIUM 5000 UNIT(S): 1000 INJECTION INTRAVENOUS; SUBCUTANEOUS at 21:48

## 2024-11-30 NOTE — PROGRESS NOTE ADULT - SUBJECTIVE AND OBJECTIVE BOX
HPI:  Unknown age male, unknown past medical history (reported stroke and MI by coworkers) presented to [facility] with AMS, Pt was working at [employer] when he was found down by coworkers. EMS called and pt brought to [facility] ED. Intubated, sedated, started on cardene for SBPs in 200s. CT head showed brain stem bleed. Transferred to NSICU for further management.  (30 Sep 2024 12:55)    HOSPITAL COURSE:  9/30: transferred from [facility]. A line placed. Versed dc'd. Cy Rader at bedside, states pt has family in [place], cannot confirm medications or PMH other than stroke and MI. 250cc bolus 3% given. LR switched to NS. hydralazine 25q8 started, 3% started, switched propofol to precedex   10/1: stability CTH done. Added labetalol, started TF. Palliative consulted. ethics consulted to determine surrogate. febrile 103, pan cx sent  10/2: BD 2, GEORGE overnight. TF resumed. Desatt'd to 80s, FiO2 inc. to 50. Fentanyl given, ABG, CXR ordered. Maxxed on precedex, started on propofol for DARIEN -4 - -5. Precedex dc'd. Duonebs, mucomyst, hypertonic added. 3% dc'd. Cardene dc'd. Start vanc/CTX. Increased labetalol 200q8. MRSA negative, dc'd vanc. ETT pulled back 2cm x 2, good positioning after confirmatory chest xray. Ethics attempting to establish HCP with family. Na 159, starting FW 250q6 for range 150-155.   10/3: BD3, GEORGE o/n, neuro stable. Na elevating, FW increased to 300q6. Dc'd bowel reg for diarrhea. vEEG started. SQH 5000q8 tonight.   10/4: BD 4, albumn bolus, incr. LR to 80 2/2 incr. in Cr, LR to 100cc/hr for uptrending Cr. Started 7% hypersal for 48hrs and SL atropine for inline/oral thick secretions. Dc'd CTX and started ancef for MSSA in the sputum. Nephrology consulted for CKD, f/u recs. SBP 170s, given hydralazine 10mg IVP.   10/5: BD5, o/n 10mg IVP hydralazine given for SBP 170s and started on hydralazine 25q8 via OGT. 10mg IV push labetalol for SBP > 160s. RT placed for diarrhea.   10/6: BD6, o/n FW increased to 350q4 per nephrology recs. IV tylenol for temp 100.6, SBp 160s presumed uncomfortable.   10/7: BD7, overnight pancultured for temp 101.8F.   10/8: BD8. GEORGE. Cr bumped. decreased LR to 75cc/hr. Adding simethicone ATC. incr hydralazine 50mgTID. Incr labetalol 300mgTID. Na 145, decreased FWF to 250q6. Start precedex. FENa consistent with intrinsic kidney injury. Pend repeat renal US. Retaining up to 1.3L, bladder scans q6, straight cath PRN  10/9: BD 9. GEORGE overnight. Neuro stable. abd xray for distention w non-specific gas pattern, OGT to LIWS for morning. duonebs/mucomyst to q8 for improving secretions. Changed tube feeds to Jevity 1.5 20cc/hr, low rate due to abdominal distention, nepro dense and more difficult to digest. Tolerating CPAP, confirmed by ABG.   10/10: BD 10. GEORGE overnight. Neuro stable. (+) gabriel for urinary retention on bladder scan. inc TF to goal rate of 40cc/hr. family leaning toward pursuing trach/PEG. 1/2 amp for FS 81.   10/11: BD 11. GEORGE overnight. Neuro stable. Trach/PEG consults placed.   10/12: BD 12. GEORGE overnight. Neuro stable. MRI brain complete.   10/13: BD 13. Increase flomax. Hold SQH after PM dose for trach tm. IVL.   10/14: BD 14. GEORGE overnight, remains on AC/VC. Gabriel placed for urinary retention. Dc'd free water.  S/p trach with pulm. NGT placed and CXR confirmed in good position.   10/15: BD 15, GEORGE ovn. resumed feeds. spiked 101, pan cx sent.   10/16: BD 16. GEORGE ovn. Lokelma 5mg for K+ 5.4. Started vanc q 24/zosyn for empiric PNA coverage, IVF to 100/hr. PEG held for fever.   10/17: BD 17,  ordered serum osm and urine osm for am. Started sinemet for neurostimulation. Increased cardura to 0.8. Started FW 100q4, dc'd IVF. MRSA negative, dc'd vanc. NGT replaced d/t coiling.   10/18: BD 18, GEORGE overnight, neuro stable. Amantadine added for neurostim. zosyn changed to unasyn for acinetobacter baumannii, failed TOV and required SC  10/19: BD 19, GEORGE ovn. cardura 2mg added for retention. labetalol decreased 200q8, hydralazine decreased 25q8. Gabriel replaced.   10/20: BD20, GEORGE overnight. NGT dislodged, replaced. PEG tomorrow w/ gen surg, FW increased to 150q4 and labetalol decreased to 100q8, lokelma given for hyperkalemia.   10/21: BD 21. POD0 PEG placement with Gen surg. decr labetolol to 50q8, incr. cardura to 0.4, started lokelma and phoslo, dc gabriel POD0 PEG placement with Gen surg.  10/22: BD 22. Plan to start TF today via PEG. dc labetalol, Following ophtho recs. Increased apnea settings - found to be in cheyne-moe respiration. CPAP 5/5.  10/23: BD 23. hydralazine d/c'd, trach collar trial today. Rectal tube placed at 6am.  10/24: BD24, o/n lokelma held due to diarrhea. Free water 100q6 resumed. dc'd tamsulosin, amantadine. Incr'd cardura to 8mg qhs. Dc'd FW. Switched jevity to nepro. gabriel placed for high urine output. Started SL atropine for oral secretions. Dc'd free water.  10/25: BD25, o/n decreased suctioning requirements to > q4hrs, GEORGE. Cr improving, cont phoslo, lokelma held at this time. Gabriel placed yest, cont. Tolerating trach collar. Given 500cc plasmalyte bolus for ANIKA. Dc'd sinemet.   10/26: BD26, o/n resumed lokelma 5mg daily and resumed 100cc free water q6hrs. Change in neuro status with new right pupillary dilation with anisocoria (right pupil 6mm fixed and left pupil 3mm briskly reactive). Given 23.4% NaCl bullet, taken for emergent CTH showing mostly resolved pontine hemorrhage, continued brainstem hypodensity likely edema d/t hemorrhage, no new hemorrhage or infarct, no herniation, mild increase in size of left lateral ventricle. Vitals remaine stable. Na goal > 140.   10/27: BD27, o/n GEORGE.Neuro stable. Pend stepdown with airway bed.   10/28: BD 28. GEORGE overnight. Neuro stable. Miralax ordered. Gabriel removed, pending TOV.  10/29: BD 29. GEORGE o/n. Given 2L NS over 8 hrs for increased BUN/Cr ratio. Gabriel placed for frequent straight cath.   10/30: BD 30.   10/31: BD 31. GEORGE overnight. Na 149, increased free water to 200q6. 1L NS for uptrending BUN.   11/1: BD 32. GEORGE overnight. Given 1L NS for dehydration. Na 146, increased FW to 250q6.   11/2: BD 33 GEORGE overnight, neuro stable, given 500cc bolus for net negative status and tachycardia   11/3: BD 34, GEORGE overnight, neuro stable. Patient remains tachycardic, EKG showing sinus tachycardia, given additional 500cc NS bolus. Febrile to 101.9F, pan cultured (without UA), CXR WNL, given tylenol.   11/4: BD 35, GEORGE overnight, neuro stable. Given 1L NS for tachycardia. sputum (+) for stenotropohomas maltophilia.   11/5: BD 36 GEORGE overnight, neuro stable. Vancomycin dc'd. Chest PT BID. ID consulted, cont zosyn.  11/6: BD 37. blood cx + klebsiella dc zosyn changed to cefepime, CTAP ordered, rpt blood cx sent.    11/7: BD 38. Pending CT A/P, given 250cc bolus and starting maintenance fluids overnight. Pending CT A/P after bolus   11/8: BD 39. CT CAP negative for infection.   11/9: BD 40. GEORGE overnight.  11/10: BD 41. GEORGE overnight. desat to 85 on trach collar, O2 inc to 10L and 100%, O2 sat inc to 95. pt tachy to 110s, euvolemic. given tylenol. ABG and CXR ordered. spiked fever, pancultured, RVP negative. AM ABG w pO2 79, rpt w pO2 79. pt appears comfortable, satting 94%.   11/11: BD 42. GEORGE overnight. pt became tachy to 130s, desat to 90 on 100% FiO2 and 10L. suctioned, (+) productive cough. temp 101.4, given 1g IV tylenol and 500cc NS bolus for euvolemia. fever and HR downtrending. LE dopplers negative for dvt  11/12: BD 43, GEORGE ovn, fever and HR downtrending, satting 97% 70% FIO2  11/13: BD 44, GEORGE ovn. started standing tylenol x24 hours for tachycardia. desat to 80s, o2 increased. CXR stable, pending CTA PE protocol.   11/14: BD 45, GEORGE overnight, neuro stable. resp therapy dec FiO2 to 70%.   11/15: BD 46, GEORGE overnight, neuro stable.  Rapid called for desaturation 30s, tachycardic 140s. Patient bagged, 100% fio2, heavily suctioned. CXR/POCUS unremarkable. ABG c/w desaturation. WBC 14.71. Afebrile. O2 improved to 90s and patient upgraded to ICU. ABG paO2 30s improved to 89 on vent. IV Tylenol x 1, sputum sent. Start protonix while o-n vent.   11/16: BD 47. POCUS showed collapsable IVCF, given 1L bolus. Vanco/Cefpime added empriic for PNA, NGT feeds restarted. MRSA swab neg, Vanc DC'd.   11/17: BD 48. GEORGE overnight. 1l bolus for tachycardia. Spiked to 101, cultured. 500cc bolus for tachycardia, tachy to 148 given 25mcg fentanyl, 250cc albumin, 1.5L bolus. 5 IV lopressor with response HR to 100s. +Stenotrophomonas on sputum cx.   11/18: BD 49. GEORGE overnight. Consulted ID, cefepime switched to bactrim x7days. Started hydrochlorothiazine 12.5mg daily.  11/19: BD 50. Tachy 120s, given tylenol and 500cc NS. Tolerating 5/5, switched to TCx. Holding phos binder. D/c Bactrim. D/c gabriel, f/u TOV. Dc'd PPI.   11/20: BD 51. GEORGE ovn. 1600 satting low 90s, mildly tachy to 110s, afebrile, RR wnl. O2 improved to mid 90s while inc O2 to 100% on TCx. CPAP 5/5 placed back on.  11/21: BD 52, GEORGE ovn, tolerating CPAP 5/5. Switch to trach collar during the day if tolerating well. HCTZ held for Cr bump, straight cath frequence increased to q4  11/22: BD 53, GEORGE ovn. Resumed phoslo. Gabriel placed. Resumed HCTZ.   11/23: BD 54. Holding tylenol in setting of possible fever, will require pan cx if febrile. Cr improved today. Cont CPAP. Bowel regimen held i/s/o diarrhea. FOBT negative.  11/24: BD 55. GEORGE overnight. Neuro stable. HCTZ dc'ed, started lisinopril 5. Lokelma dc'ed for K 3.7.   11/25: BD 56, GEORGE overnight. Neuro stable. dc'd lisinopril 5mg. Gabriel dc'd. TOV. 1545 noted to be hypotensive, MAP 50, in supine position on chair, HR 60s, afebrile, O2 96%. Given 1L cc NS bolus, placed back on bed in reverse trendelenberg, improved to map of 66. Neostick at bedside. Vitals check q1h. Dc'ed amlodipine. Failed TOV, bladder scan q6, sc prn. Added back Senna.   11/26: BD 57, GEORGE overnight. Neuro stable. Dc'd phoslo.   11/27: BD 58, GEORGE overnight. Neuro stable.    11/28: BD 59. Gabriel replaced.   11/29: GEORGE.    OVERNIGHT EVENTS: GEORGE overnight.     Vital Signs Last 24 Hrs  T(C): 36.8 (29 Nov 2024 21:35), Max: 36.9 (29 Nov 2024 05:00)  T(F): 98.2 (29 Nov 2024 21:35), Max: 98.5 (29 Nov 2024 05:00)  HR: 72 (29 Nov 2024 20:35) (70 - 86)  BP: 126/80 (29 Nov 2024 20:35) (111/72 - 131/84)  BP(mean): 98 (29 Nov 2024 20:35) (87 - 106)  RR: 16 (29 Nov 2024 20:35) (16 - 18)  SpO2: 97% (29 Nov 2024 20:35) (92% - 97%)    Parameters below as of 29 Nov 2024 20:35  Patient On (Oxygen Delivery Method): tracheostomy collar  O2 Flow (L/min): 10  O2 Concentration (%): 40    I&O's Summary    28 Nov 2024 07:01  -  29 Nov 2024 07:00  --------------------------------------------------------  IN: 775 mL / OUT: 1725 mL / NET: -950 mL    29 Nov 2024 07:01  -  30 Nov 2024 00:31  --------------------------------------------------------  IN: 1280 mL / OUT: 950 mL / NET: 330 mL    PHYSICAL EXAM:  Constitutional: Lying in bed, in NAD  Respiratory: +Trach collar, breathing non-labored, symmetrical chest wall movement  Cardiovascular RRR, no murmurs  Gastrointestinal: +PEG, abdomen soft, non tender  Genitourinary: exam deferred  Neurological:  Opens eyes spontaneously, intermittently following commands. Awake and alert but not oriented.  Cranial Nerves: PERRL 3mm brisk, tracks vertically, facial movements appear symmetric, tongue midline. No verbal output.   Motor: LUE/LLE withdraws to noxious, RUE trace movement in fingers spontaneously, RLE wiggles toes to command.     DIET:  [] NPO  [] Mechanical  [X] Tube feeds    LABS:                        10.8   7.94  )-----------( 236      ( 29 Nov 2024 06:17 )             33.9     11-29    136  |  102  |  58[H]  ----------------------------<  137[H]  3.5   |  21[L]  |  1.26    Ca    9.2      29 Nov 2024 06:17  Phos  2.8     11-29  Mg     2.0     11-29    Urinalysis Basic - ( 29 Nov 2024 06:17 )    Color: x / Appearance: x / SG: x / pH: x  Gluc: 137 mg/dL / Ketone: x  / Bili: x / Urobili: x   Blood: x / Protein: x / Nitrite: x   Leuk Esterase: x / RBC: x / WBC x   Sq Epi: x / Non Sq Epi: x / Bacteria: x      Allergies    Allergy Status Unknown    Intolerances      MEDICATIONS:  Antibiotics:    Neuro:  acetaminophen   Oral Liquid .. 650 milliGRAM(s) Oral every 6 hours PRN    Anticoagulation:  heparin   Injectable 5000 Unit(s) SubCutaneous every 8 hours    OTHER:  acetylcysteine 10%  Inhalation 4 milliLiter(s) Inhalation every 8 hours  albuterol/ipratropium for Nebulization 3 milliLiter(s) Nebulizer every 8 hours  artificial tears (preservative free) Ophthalmic Solution 1 Drop(s) Right EYE every 4 hours  chlorhexidine 2% Cloths 1 Application(s) Topical <User Schedule>  doxazosin 8 milliGRAM(s) Oral at bedtime  petrolatum Ophthalmic Ointment 1 Application(s) Both EYES two times a day  senna 2 Tablet(s) Oral at bedtime    CULTURES:  Culture Results:   Mixed gram negative rods including Moderate Stenotrophomonas maltophilia  Commensal iram consistent with body site (11-25 @ 00:19)  Culture Results:   No growth at 5 days (11-17 @ 05:59)    ASSESSMENT:  43 y/o PMH ?stroke/MI present to [facility] after collapsing at work. Decorticate posturing, vomiting, intubated for airway protection. 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 Transferred to [facility] for further management. s/p trach 10/14. s/p peg 10/21. Re-upgrade to ICU 2/2 desaturation event and suctioning requirements 11/15.     AMS    Handoff    MEWS Score    Acute myocardial infarction    CVA (cerebral vascular accident)    Intracerebral hemorrhage of brain stem    Brainstem stroke    Brain stem stroke syndrome    Brain stem hemorrhage    Brain stem stroke syndrome    Hemorrhagic stroke    Brainstem stroke    Encephalopathy acute    Functional quadriplegia    Advanced care planning/counseling discussion    Encounter for palliative care    Pontine hemorrhage    Neurogenic dysphagia    Chronic respiratory failure    Acute kidney injury superimposed on CKD    Acute urinary retention    Hypertensive emergency    Sepsis, unspecified organism    Sepsis    Gram-negative bacteremia    Percutaneous tracheal puncture    Altered mental status examination    EGD, with PEG    AMS    [email or web address]      PLAN:  Neuro:  - neuro/vitals q4h, protected sleep time 10PM-5AM  - pain control: tylenol prn  - vEEG (10/3-4)- negative, (10/17-10/19) - negative  - CTH 9/30: enlarged pontine hemorrhage, CTH 10/3: stable, CTH 10/25: mostly resolved pontine hemorrhage  - MRI brain 10/12: parenchymal hemorrhage, acute/subacute R cerebellar stroke    - stroke core measures, stroke neuro signed off    CV:  - -160  - HTN: hold amlodipine 10mg, hold lisinopril 5mg   - echo (9/30) EF 75%    Resp:  - trach collar   - Secretions: duonebs/mucomyst/chest PT q8h   - CTA chest 11/13 negative for PE, inc ground glass opacities    GI:  - TF via PEG (placed 10/21 by gen surg)  - bowel regimen, last BM 11/28  - CTAP 11/8 negative for infection    - FOBT negative 11/23    Renal:  - IVL  - FW flushes 250cc q6hrs for uptrending BUN/Cr  - Urinary retenion: Gabriel replaced 11/28  - CKD: trend BUN/Cr  - renal US 10/1: echogenicity c/w chronic med renal dz, repeat 10/8: inc renal echogeicity, c/f medical renal disease w increased hydro     Endo:  - A1c 5.4    Heme:  - DVT ppx: SCDs, SQH 5000u q8h   - LE dopplers negative 11/11    ID:  - febrile, last pancx 11/16, MRSA swab neg 11/16   -  s/p Stenotrophomonas maltophilia PNA: s/p Bactrim (11/18-11/19) s/p Cefepime (11/16-11/18)  - 11/3, (+) sputum for stenotrophomas maltophlia, blood cx (+) klebsiella, cefepime 2gq12 (11/6 - 11/12)   - empiric tx: s/p zosyn (11/3-11/6) , s/p vanc  (11/3- 11/5)  - S/p Ancef (10/4-10/14) for PNA, and s/p Unasyn (10/18-10/23) +actinobacter baumanii    MISC:  - ophtho consult for keratitis, s/p erythromycin ointment to rt eye q4hrs, ofloxacin ointment to rt eye QID, artificial tears to rt eye q2hrs, moisture chamber at bedtime    Dispo: tele status, full code, pending placement and guardianship hearing     D/w Dr. D'Amico

## 2024-11-30 NOTE — PROVIDER CONTACT NOTE (OTHER) - ASSESSMENT
Pt eyes 3mm brisk+ reactive b/l, no verbal responses to commands and orientation questions, not withdrawing from pain in upper and lower extremities.

## 2024-11-30 NOTE — PROGRESS NOTE ADULT - ASSESSMENT
44 YOF with unclear PMH (?stroke, MI) who was found down at work, intubated for airway protection and found to have acute parenchymal hemorrhage within nabil with mass effect (+ acute/subacute right cerebellar infarct) in setting of hypertensive emergency, transferred to [facility] for further neurosurgical care. Hospital course c/b poor neurologic recovery s/p trach-PEG, AUR s/p gabriel, ANIKA on CKD c/b hyperkalemia, HAP s/p amp-sulbactam (EOT 10/23), K aerogenes bacte    # Pontine hemorrhage.   -The pt was found to have acute parenchymal hemorrhage within nabil with mass effect (+ acute/subacute right cerebellar infarct) in setting of hypertensive emergency.   -The pt had an MRI brain also demonstrated acute/subacute R cerebellar stroke.   - Will continue management per NSGY    # Hypertension  -Will continue amlodipine 10mg daily     #Acute kidney injury superimposed on CKD.   -Pt s/p US renal with chronic medical renal disease. Hospital course c/b ATN.   - Will continue to renally dose medications and avoid nephrotoxins      # Chronic respiratory failure.   -Pt s/p percutaneous trach by pulm on 10/14/24  - Continue  trach collar   - Continue routine trach care and suctioning PRN  - Chest PT      # Neurogenic dysphagia.   -Pt s/pPEG with surgery 10/21/24  - TF per nutrition  - aspiration precautions and elevation of HOB.    #Acute urinary retention.   -Pt s/p gabriel placement  10/24 for urinary retention, failed TOV   - doxazosin 8mg.    # Functional quadriplegia in  setting of brainstem hemorrhage  - decub precautions  -care per nursing protocol     #DVT prop  -Continue Heparin SQ q8     #DISPO  -TBD - pending PRUCOL application

## 2024-11-30 NOTE — PROGRESS NOTE ADULT - SUBJECTIVE AND OBJECTIVE BOX
Patient is a 44y old  Male who presents with a chief complaint of brain stem bleed (30 Nov 2024 00:31)      SUBJECTIVE / OVERNIGHT EVENTS:  No acute events overnight.    MEDICATIONS  (STANDING):  acetylcysteine 10%  Inhalation 4 milliLiter(s) Inhalation every 8 hours  albuterol/ipratropium for Nebulization 3 milliLiter(s) Nebulizer every 8 hours  artificial tears (preservative free) Ophthalmic Solution 1 Drop(s) Right EYE every 4 hours  chlorhexidine 2% Cloths 1 Application(s) Topical <User Schedule>  doxazosin 8 milliGRAM(s) Oral at bedtime  heparin   Injectable 5000 Unit(s) SubCutaneous every 8 hours  petrolatum Ophthalmic Ointment 1 Application(s) Both EYES two times a day  senna 2 Tablet(s) Oral at bedtime    MEDICATIONS  (PRN):  acetaminophen   Oral Liquid .. 650 milliGRAM(s) Oral every 6 hours PRN Temp greater or equal to 38.5C (101.3F), Mild Pain (1 - 3)      CAPILLARY BLOOD GLUCOSE        I&O's Summary    29 Nov 2024 07:01  -  30 Nov 2024 07:00  --------------------------------------------------------  IN: 1770 mL / OUT: 1325 mL / NET: 445 mL    30 Nov 2024 07:01  -  30 Nov 2024 12:29  --------------------------------------------------------  IN: 120 mL / OUT: 400 mL / NET: -280 mL        PHYSICAL EXAM:  Vital Signs Last 24 Hrs  T(C): 36.4 (30 Nov 2024 09:05), Max: 36.8 (29 Nov 2024 21:35)  T(F): 97.6 (30 Nov 2024 09:05), Max: 98.2 (29 Nov 2024 21:35)  HR: 68 (30 Nov 2024 11:35) (64 - 86)  BP: 134/88 (30 Nov 2024 11:35) (111/72 - 145/94)  BP(mean): 106 (30 Nov 2024 11:35) (87 - 115)  RR: 16 (30 Nov 2024 11:35) (16 - 18)  SpO2: 98% (30 Nov 2024 11:35) (92% - 100%)    Parameters below as of 30 Nov 2024 11:35  Patient On (Oxygen Delivery Method): tracheostomy collar  O2 Flow (L/min): 10  O2 Concentration (%): 40    General: awake, not tracking, looking comfortable, no labored breathing on trach collar  HEENT: tracheostomy clean  Lungs: poor inspiration, no crackles, no wheezes  Heart: regular  Abdomen: soft, no visible tenderness, + BS  Extremities: warm, no edema, not following commands  LABS:                        11.4   8.09  )-----------( 217      ( 30 Nov 2024 05:30 )             35.4     11-30    138  |  103  |  62[H]  ----------------------------<  127[H]  3.9   |  23  |  1.29    Ca    9.3      30 Nov 2024 05:30  Phos  3.8     11-30  Mg     2.1     11-30            Urinalysis Basic - ( 30 Nov 2024 05:30 )    Color: x / Appearance: x / SG: x / pH: x  Gluc: 127 mg/dL / Ketone: x  / Bili: x / Urobili: x   Blood: x / Protein: x / Nitrite: x   Leuk Esterase: x / RBC: x / WBC x   Sq Epi: x / Non Sq Epi: x / Bacteria: x        RADIOLOGY & ADDITIONAL TESTS:    Imaging Personally Reviewed:    Consultant(s) Notes Reviewed:      Care Discussed with Consultants/Other Providers:

## 2024-12-01 LAB
ANION GAP SERPL CALC-SCNC: 13 MMOL/L — SIGNIFICANT CHANGE UP (ref 5–17)
BUN SERPL-MCNC: 61 MG/DL — HIGH (ref 7–23)
CALCIUM SERPL-MCNC: 9.3 MG/DL — SIGNIFICANT CHANGE UP (ref 8.4–10.5)
CHLORIDE SERPL-SCNC: 103 MMOL/L — SIGNIFICANT CHANGE UP (ref 96–108)
CO2 SERPL-SCNC: 23 MMOL/L — SIGNIFICANT CHANGE UP (ref 22–31)
CREAT SERPL-MCNC: 1.27 MG/DL — SIGNIFICANT CHANGE UP (ref 0.5–1.3)
EGFR: 71 ML/MIN/1.73M2 — SIGNIFICANT CHANGE UP
EGFR: 71 ML/MIN/1.73M2 — SIGNIFICANT CHANGE UP
GLUCOSE SERPL-MCNC: 137 MG/DL — HIGH (ref 70–99)
HCT VFR BLD CALC: 33.7 % — LOW (ref 39–50)
HGB BLD-MCNC: 11.2 G/DL — LOW (ref 13–17)
MAGNESIUM SERPL-MCNC: 2.1 MG/DL — SIGNIFICANT CHANGE UP (ref 1.6–2.6)
MCHC RBC-ENTMCNC: 31 PG — SIGNIFICANT CHANGE UP (ref 27–34)
MCHC RBC-ENTMCNC: 33.2 G/DL — SIGNIFICANT CHANGE UP (ref 32–36)
MCV RBC AUTO: 93.4 FL — SIGNIFICANT CHANGE UP (ref 80–100)
NRBC # BLD: 0 /100 WBCS — SIGNIFICANT CHANGE UP (ref 0–0)
NRBC BLD-RTO: 0 /100 WBCS — SIGNIFICANT CHANGE UP (ref 0–0)
PHOSPHATE SERPL-MCNC: 3.1 MG/DL — SIGNIFICANT CHANGE UP (ref 2.5–4.5)
PLATELET # BLD AUTO: 219 K/UL — SIGNIFICANT CHANGE UP (ref 150–400)
POTASSIUM SERPL-MCNC: 3.9 MMOL/L — SIGNIFICANT CHANGE UP (ref 3.5–5.3)
POTASSIUM SERPL-SCNC: 3.9 MMOL/L — SIGNIFICANT CHANGE UP (ref 3.5–5.3)
RBC # BLD: 3.61 M/UL — LOW (ref 4.2–5.8)
RBC # FLD: 14.5 % — SIGNIFICANT CHANGE UP (ref 10.3–14.5)
SODIUM SERPL-SCNC: 139 MMOL/L — SIGNIFICANT CHANGE UP (ref 135–145)
WBC # BLD: 7.69 K/UL — SIGNIFICANT CHANGE UP (ref 3.8–10.5)
WBC # FLD AUTO: 7.69 K/UL — SIGNIFICANT CHANGE UP (ref 3.8–10.5)

## 2024-12-01 PROCEDURE — 99233 SBSQ HOSP IP/OBS HIGH 50: CPT

## 2024-12-01 PROCEDURE — 99232 SBSQ HOSP IP/OBS MODERATE 35: CPT

## 2024-12-01 RX ADMIN — HEPARIN SODIUM 5000 UNIT(S): 1000 INJECTION INTRAVENOUS; SUBCUTANEOUS at 22:53

## 2024-12-01 RX ADMIN — ACETYLCYSTEINE 4 MILLILITER(S): 200 INHALANT RESPIRATORY (INHALATION) at 14:44

## 2024-12-01 RX ADMIN — DOXAZOSIN MESYLATE 8 MILLIGRAM(S): 8 TABLET ORAL at 22:54

## 2024-12-01 RX ADMIN — HEPARIN SODIUM 5000 UNIT(S): 1000 INJECTION INTRAVENOUS; SUBCUTANEOUS at 14:15

## 2024-12-01 RX ADMIN — ACETYLCYSTEINE 4 MILLILITER(S): 200 INHALANT RESPIRATORY (INHALATION) at 22:53

## 2024-12-01 RX ADMIN — Medication 20 MILLIEQUIVALENT(S): at 07:15

## 2024-12-01 RX ADMIN — IPRATROPIUM BROMIDE AND ALBUTEROL SULFATE 3 MILLILITER(S): .5; 2.5 SOLUTION RESPIRATORY (INHALATION) at 22:54

## 2024-12-01 RX ADMIN — Medication 1 APPLICATION(S): at 18:03

## 2024-12-01 RX ADMIN — IPRATROPIUM BROMIDE AND ALBUTEROL SULFATE 3 MILLILITER(S): .5; 2.5 SOLUTION RESPIRATORY (INHALATION) at 14:09

## 2024-12-01 RX ADMIN — Medication 1 APPLICATION(S): at 05:42

## 2024-12-01 RX ADMIN — Medication 1 DROP(S): at 17:56

## 2024-12-01 RX ADMIN — HEPARIN SODIUM 5000 UNIT(S): 1000 INJECTION INTRAVENOUS; SUBCUTANEOUS at 05:42

## 2024-12-01 RX ADMIN — Medication 1 DROP(S): at 02:11

## 2024-12-01 RX ADMIN — IPRATROPIUM BROMIDE AND ALBUTEROL SULFATE 3 MILLILITER(S): .5; 2.5 SOLUTION RESPIRATORY (INHALATION) at 05:42

## 2024-12-01 RX ADMIN — Medication 1 DROP(S): at 22:54

## 2024-12-01 RX ADMIN — Medication 1 DROP(S): at 14:15

## 2024-12-01 RX ADMIN — Medication 2 TABLET(S): at 22:54

## 2024-12-01 RX ADMIN — Medication 1 DROP(S): at 05:43

## 2024-12-01 RX ADMIN — ACETYLCYSTEINE 4 MILLILITER(S): 200 INHALANT RESPIRATORY (INHALATION) at 05:42

## 2024-12-01 RX ADMIN — Medication 1 DROP(S): at 10:17

## 2024-12-01 RX ADMIN — Medication 1 APPLICATION(S): at 05:44

## 2024-12-01 NOTE — PROGRESS NOTE ADULT - SUBJECTIVE AND OBJECTIVE BOX
Patient is a 44y old  Male who presents with a chief complaint of brain stem bleed (01 Dec 2024 00:18)      SUBJECTIVE / OVERNIGHT EVENTS:  No acute events overnight.    MEDICATIONS  (STANDING):  acetylcysteine 10%  Inhalation 4 milliLiter(s) Inhalation every 8 hours  albuterol/ipratropium for Nebulization 3 milliLiter(s) Nebulizer every 8 hours  artificial tears (preservative free) Ophthalmic Solution 1 Drop(s) Right EYE every 4 hours  chlorhexidine 2% Cloths 1 Application(s) Topical <User Schedule>  doxazosin 8 milliGRAM(s) Oral at bedtime  heparin   Injectable 5000 Unit(s) SubCutaneous every 8 hours  petrolatum Ophthalmic Ointment 1 Application(s) Both EYES two times a day  senna 2 Tablet(s) Oral at bedtime    MEDICATIONS  (PRN):  acetaminophen   Oral Liquid .. 650 milliGRAM(s) Oral every 6 hours PRN Temp greater or equal to 38.5C (101.3F), Mild Pain (1 - 3)      CAPILLARY BLOOD GLUCOSE        I&O's Summary    30 Nov 2024 07:01  -  01 Dec 2024 07:00  --------------------------------------------------------  IN: 1550 mL / OUT: 1475 mL / NET: 75 mL    01 Dec 2024 07:01  -  01 Dec 2024 11:50  --------------------------------------------------------  IN: 120 mL / OUT: 300 mL / NET: -180 mL        PHYSICAL EXAM:  Vital Signs Last 24 Hrs  T(C): 36.7 (01 Dec 2024 09:00), Max: 37.3 (30 Nov 2024 22:06)  T(F): 98.1 (01 Dec 2024 09:00), Max: 99.1 (30 Nov 2024 22:06)  HR: 78 (01 Dec 2024 08:54) (76 - 92)  BP: 121/91 (01 Dec 2024 08:13) (112/78 - 131/99)  BP(mean): 102 (01 Dec 2024 08:13) (90 - 112)  RR: 18 (01 Dec 2024 08:13) (16 - 18)  SpO2: 96% (01 Dec 2024 08:54) (93% - 97%)    Parameters below as of 01 Dec 2024 08:54  Patient On (Oxygen Delivery Method): tracheostomy collar  O2 Flow (L/min): 10  O2 Concentration (%): 40  General: awake, not tracking, looking comfortable, no labored breathing on trach collar  HEENT: tracheostomy clean  Lungs: poor inspiration, no crackles, no wheezes  Heart: regular  Abdomen: soft, no visible tenderness, + BS  Extremities: warm, no edema, not following commands    LABS:                        11.2   7.69  )-----------( 219      ( 01 Dec 2024 05:30 )             33.7     12-01    139  |  103  |  61[H]  ----------------------------<  137[H]  3.9   |  23  |  1.27    Ca    9.3      01 Dec 2024 05:30  Phos  3.1     12-01  Mg     2.1     12-01            Urinalysis Basic - ( 01 Dec 2024 05:30 )    Color: x / Appearance: x / SG: x / pH: x  Gluc: 137 mg/dL / Ketone: x  / Bili: x / Urobili: x   Blood: x / Protein: x / Nitrite: x   Leuk Esterase: x / RBC: x / WBC x   Sq Epi: x / Non Sq Epi: x / Bacteria: x        RADIOLOGY & ADDITIONAL TESTS:    Imaging Personally Reviewed:    Consultant(s) Notes Reviewed:      Care Discussed with Consultants/Other Providers:

## 2024-12-01 NOTE — PROGRESS NOTE ADULT - SUBJECTIVE AND OBJECTIVE BOX
HPI:  Unknown age male, unknown past medical history (reported stroke and MI by coworkers) presented to [facility] with AMS, Pt was working at [employer] when he was found down by coworkers. EMS called and pt brought to [facility] ED. Intubated, sedated, started on cardene for SBPs in 200s. CT head showed brain stem bleed. Transferred to NSICU for further management.  (30 Sep 2024 12:55)      Subjective:  GEORGE overnight.     Hospital course:  9/30: transferred from [facility]. A line placed. Versed dc'd. Cy Rader at bedside, states pt has family in [place], cannot confirm medications or PMH other than stroke and MI. 250cc bolus 3% given. LR switched to NS. hydralazine 25q8 started, 3% started, switched propofol to precedex   10/1: stability CTH done. Added labetalol, started TF. Palliative consulted. ethics consulted to determine surrogate. febrile 103, pan cx sent  10/2: BD 2, GEORGE overnight. TF resumed. Desatt'd to 80s, FiO2 inc. to 50. Fentanyl given, ABG, CXR ordered. Maxxed on precedex, started on propofol for DARIEN -4 - -5. Precedex dc'd. Duonebs, mucomyst, hypertonic added. 3% dc'd. Cardene dc'd. Start vanc/CTX. Increased labetalol 200q8. MRSA negative, dc'd vanc. ETT pulled back 2cm x 2, good positioning after confirmatory chest xray. Ethics attempting to establish HCP with family. Na 159, starting FW 250q6 for range 150-155.   10/3: BD3, GEORGE o/n, neuro stable. Na elevating, FW increased to 300q6. Dc'd bowel reg for diarrhea. vEEG started. SQH 5000q8 tonight.   10/4: BD 4, albumn bolus, incr. LR to 80 2/2 incr. in Cr, LR to 100cc/hr for uptrending Cr. Started 7% hypersal for 48hrs and SL atropine for inline/oral thick secretions. Dc'd CTX and started ancef for MSSA in the sputum. Nephrology consulted for CKD, f/u recs. SBP 170s, given hydralazine 10mg IVP.   10/5: BD5, o/n 10mg IVP hydralazine given for SBP 170s and started on hydralazine 25q8 via OGT. 10mg IV push labetalol for SBP > 160s. RT placed for diarrhea.   10/6: BD6, o/n FW increased to 350q4 per nephrology recs. IV tylenol for temp 100.6, SBp 160s presumed uncomfortable.   10/7: BD7, overnight pancultured for temp 101.8F.   10/8: BD8. GEORGE. Cr bumped. decreased LR to 75cc/hr. Adding simethicone ATC. incr hydralazine 50mgTID. Incr labetalol 300mgTID. Na 145, decreased FWF to 250q6. Start precedex. FENa consistent with intrinsic kidney injury. Pend repeat renal US. Retaining up to 1.3L, bladder scans q6, straight cath PRN  10/9: BD 9. GEORGE overnight. Neuro stable. abd xray for distention w non-specific gas pattern, OGT to LIWS for morning. duonebs/mucomyst to q8 for improving secretions. Changed tube feeds to Jevity 1.5 20cc/hr, low rate due to abdominal distention, nepro dense and more difficult to digest. Tolerating CPAP, confirmed by ABG.   10/10: BD 10. GEORGE overnight. Neuro stable. (+) gabriel for urinary retention on bladder scan. inc TF to goal rate of 40cc/hr. family leaning toward pursuing trach/PEG. 1/2 amp for FS 81.   10/11: BD 11. GEORGE overnight. Neuro stable. Trach/PEG consults placed.   10/12: BD 12. GEORGE overnight. Neuro stable. MRI brain complete.   10/13: BD 13. Increase flomax. Hold SQH after PM dose for trach tm. IVL.   10/14: BD 14. GEORGE overnight, remains on AC/VC. Gabriel placed for urinary retention. Dc'd free water.  S/p trach with pulm. NGT placed and CXR confirmed in good position.   10/15: BD 15, GEORGE ovn. resumed feeds. spiked 101, pan cx sent.   10/16: BD 16. GEORGE ovn. Lokelma 5mg for K+ 5.4. Started vanc q 24/zosyn for empiric PNA coverage, IVF to 100/hr. PEG held for fever.   10/17: BD 17,  ordered serum osm and urine osm for am. Started sinemet for neurostimulation. Increased cardura to 0.8. Started FW 100q4, dc'd IVF. MRSA negative, dc'd vanc. NGT replaced d/t coiling.   10/18: BD 18, GEORGE overnight, neuro stable. Amantadine added for neurostim. zosyn changed to unasyn for acinetobacter baumannii, failed TOV and required SC  10/19: BD 19, GEORGE ovn. cardura 2mg added for retention. labetalol decreased 200q8, hydralazine decreased 25q8. Gabriel replaced.   10/20: BD20, GEORGE overnight. NGT dislodged, replaced. PEG tomorrow w/ gen surg, FW increased to 150q4 and labetalol decreased to 100q8, lokelma given for hyperkalemia.   10/21: BD 21. POD0 PEG placement with Gen surg. decr labetolol to 50q8, incr. cardura to 0.4, started lokelma and phoslo, dc gabriel POD0 PEG placement with Gen surg.  10/22: BD 22. Plan to start TF today via PEG. dc labetalol, Following ophtho recs. Increased apnea settings - found to be in cheyne-moe respiration. CPAP 5/5.  10/23: BD 23. hydralazine d/c'd, trach collar trial today. Rectal tube placed at 6am.  10/24: BD24, o/n lokelma held due to diarrhea. Free water 100q6 resumed. dc'd tamsulosin, amantadine. Incr'd cardura to 8mg qhs. Dc'd FW. Switched jevity to nepro. gabriel placed for high urine output. Started SL atropine for oral secretions. Dc'd free water.  10/25: BD25, o/n decreased suctioning requirements to > q4hrs, GEORGE. Cr improving, cont phoslo, lokelma held at this time. Gabriel placed yest, cont. Tolerating trach collar. Given 500cc plasmalyte bolus for ANIKA. Dc'd sinemet.   10/26: BD26, o/n resumed lokelma 5mg daily and resumed 100cc free water q6hrs. Change in neuro status with new right pupillary dilation with anisocoria (right pupil 6mm fixed and left pupil 3mm briskly reactive). Given 23.4% NaCl bullet, taken for emergent CTH showing mostly resolved pontine hemorrhage, continued brainstem hypodensity likely edema d/t hemorrhage, no new hemorrhage or infarct, no herniation, mild increase in size of left lateral ventricle. Vitals remaine stable. Na goal > 140.   10/27: BD27, o/n GEORGE.Neuro stable. Pend stepdown with airway bed.   10/28: BD 28. GEORGE overnight. Neuro stable. Miralax ordered. Gabriel removed, pending TOV.  10/29: BD 29. GEORGE o/n. Given 2L NS over 8 hrs for increased BUN/Cr ratio. Gabriel placed for frequent straight cath.   10/30: BD 30.   10/31: BD 31. GEORGE overnight. Na 149, increased free water to 200q6. 1L NS for uptrending BUN.   11/1: BD 32. GEORGE overnight. Given 1L NS for dehydration. Na 146, increased FW to 250q6.   11/2: BD 33 GEORGE overnight, neuro stable, given 500cc bolus for net negative status and tachycardia   11/3: BD 34, GEORGE overnight, neuro stable. Patient remains tachycardic, EKG showing sinus tachycardia, given additional 500cc NS bolus. Febrile to 101.9F, pan cultured (without UA), CXR WNL, given tylenol.   11/4: BD 35, GEORGE overnight, neuro stable. Given 1L NS for tachycardia. sputum (+) for stenotropohomas maltophilia.   11/5: BD 36 GEORGE overnight, neuro stable. Vancomycin dc'd. Chest PT BID. ID consulted, cont zosyn.  11/6: BD 37. blood cx + klebsiella dc zosyn changed to cefepime, CTAP ordered, rpt blood cx sent.    11/7: BD 38. Pending CT A/P, given 250cc bolus and starting maintenance fluids overnight. Pending CT A/P after bolus   11/8: BD 39. CT CAP negative for infection.   11/9: BD 40. GEORGE overnight.  11/10: BD 41. GEORGE overnight. desat to 85 on trach collar, O2 inc to 10L and 100%, O2 sat inc to 95. pt tachy to 110s, euvolemic. given tylenol. ABG and CXR ordered. spiked fever, pancultured, RVP negative. AM ABG w pO2 79, rpt w pO2 79. pt appears comfortable, satting 94%.   11/11: BD 42. GEORGE overnight. pt became tachy to 130s, desat to 90 on 100% FiO2 and 10L. suctioned, (+) productive cough. temp 101.4, given 1g IV tylenol and 500cc NS bolus for euvolemia. fever and HR downtrending. LE dopplers negative for dvt  11/12: BD 43, GEORGE ovn, fever and HR downtrending, satting 97% 70% FIO2  11/13: BD 44, GEORGE ovn. started standing tylenol x24 hours for tachycardia. desat to 80s, o2 increased. CXR stable, pending CTA PE protocol.   11/14: BD 45, GEORGE overnight, neuro stable. resp therapy dec FiO2 to 70%.   11/15: BD 46, GEORGE overnight, neuro stable.  Rapid called for desaturation 30s, tachycardic 140s. Patient bagged, 100% fio2, heavily suctioned. CXR/POCUS unremarkable. ABG c/w desaturation. WBC 14.71. Afebrile. O2 improved to 90s and patient upgraded to ICU. ABG paO2 30s improved to 89 on vent. IV Tylenol x 1, sputum sent. Start protonix while o-n vent.   11/16: BD 47. POCUS showed collapsable IVCF, given 1L bolus. Vanco/Cefpime added empriic for PNA, NGT feeds restarted. MRSA swab neg, Vanc DC'd.   11/17: BD 48. GEORGE overnight. 1l bolus for tachycardia. Spiked to 101, cultured. 500cc bolus for tachycardia, tachy to 148 given 25mcg fentanyl, 250cc albumin, 1.5L bolus. 5 IV lopressor with response HR to 100s. +Stenotrophomonas on sputum cx.   11/18: BD 49. GEORGE overnight. Consulted ID, cefepime switched to bactrim x7days. Started hydrochlorothiazine 12.5mg daily.  11/19: BD 50. Tachy 120s, given tylenol and 500cc NS. Tolerating 5/5, switched to TCx. Holding phos binder. D/c Bactrim. D/c gabriel, f/u TOV. Dc'd PPI.   11/20: BD 51. GEORGE ovn. 1600 satting low 90s, mildly tachy to 110s, afebrile, RR wnl. O2 improved to mid 90s while inc O2 to 100% on TCx. CPAP 5/5 placed back on.  11/21: BD 52, GEORGE ovn, tolerating CPAP 5/5. Switch to trach collar during the day if tolerating well. HCTZ held for Cr bump, straight cath frequence increased to q4  11/22: BD 53, GEORGE ovn. Resumed phoslo. Gabriel placed. Resumed HCTZ.   11/23: BD 54. Holding tylenol in setting of possible fever, will require pan cx if febrile. Cr improved today. Cont CPAP. Bowel regimen held i/s/o diarrhea. FOBT negative.  11/24: BD 55. GEORGE overnight. Neuro stable. HCTZ dc'ed, started lisinopril 5. Lokelma dc'ed for K 3.7.   11/25: BD 56, GEORGE overnight. Neuro stable. dc'd lisinopril 5mg. Gabriel dc'd. TOV. 1545 noted to be hypotensive, MAP 50, in supine position on chair, HR 60s, afebrile, O2 96%. Given 1L cc NS bolus, placed back on bed in reverse trendelenberg, improved to map of 66. Neostick at bedside. Vitals check q1h. Dc'ed amlodipine. Failed TOV, bladder scan q6, sc prn. Added back Senna.   11/26: BD 57, GEORGE overnight. Neuro stable. Dc'd phoslo.   11/27: BD 58, GEORGE overnight. Neuro stable.    11/28: BD 59. Gabriel replaced.   11/29: GEORGE.  11/30: GEORGE, neuro stable.   12/1: BD 62, GEORGE overnight      Vital Signs Last 24 Hrs  T(C): 37.3 (30 Nov 2024 22:06), Max: 37.3 (30 Nov 2024 22:06)  T(F): 99.1 (30 Nov 2024 22:06), Max: 99.1 (30 Nov 2024 22:06)  HR: 92 (01 Dec 2024 00:12) (64 - 92)  BP: 112/78 (01 Dec 2024 00:12) (112/78 - 145/94)  BP(mean): 90 (01 Dec 2024 00:12) (90 - 115)  RR: 16 (01 Dec 2024 00:12) (16 - 18)  SpO2: 93% (01 Dec 2024 00:12) (93% - 100%)    Parameters below as of 01 Dec 2024 00:12  Patient On (Oxygen Delivery Method): tracheostomy collar  O2 Flow (L/min): 10  O2 Concentration (%): 40    I&O's Summary    29 Nov 2024 07:01  -  30 Nov 2024 07:00  --------------------------------------------------------  IN: 1770 mL / OUT: 1325 mL / NET: 445 mL    30 Nov 2024 07:01  -  01 Dec 2024 00:18  --------------------------------------------------------  IN: 1430 mL / OUT: 1075 mL / NET: 355 mL        PHYSICAL EXAM:  General: patient seen laying supine in bed in NAD  Neuro: OEs to voice, A&Ox0, L hand 2/5 and L triceps 3/5 to command, RLE withdraws to noxious, LLE trace withdrawal, LUE 0/5  HEENT: PERRL, only tracks vertically, +trach collar  Neck: supple  Cardiac: RRR, S1S2  Pulmonary: chest rise symmetric  Abdomen: soft, nontender, nondistended, +PEG  Ext: perfusing well  Skin: warm, dry    LABS:                        11.4   8.09  )-----------( 217      ( 30 Nov 2024 05:30 )             35.4     11-30    138  |  103  |  62[H]  ----------------------------<  127[H]  3.9   |  23  |  1.29    Ca    9.3      30 Nov 2024 05:30  Phos  3.8     11-30  Mg     2.1     11-30        Urinalysis Basic - ( 30 Nov 2024 05:30 )    Color: x / Appearance: x / SG: x / pH: x  Gluc: 127 mg/dL / Ketone: x  / Bili: x / Urobili: x   Blood: x / Protein: x / Nitrite: x   Leuk Esterase: x / RBC: x / WBC x   Sq Epi: x / Non Sq Epi: x / Bacteria: x          CAPILLARY BLOOD GLUCOSE          Drug Levels: [] N/A    CSF Analysis: [] N/A      Allergies    Allergy Status Unknown    Intolerances      MEDICATIONS:  Antibiotics:    Neuro:  acetaminophen   Oral Liquid .. 650 milliGRAM(s) Oral every 6 hours PRN    Anticoagulation:  heparin   Injectable 5000 Unit(s) SubCutaneous every 8 hours    OTHER:  acetylcysteine 10%  Inhalation 4 milliLiter(s) Inhalation every 8 hours  albuterol/ipratropium for Nebulization 3 milliLiter(s) Nebulizer every 8 hours  artificial tears (preservative free) Ophthalmic Solution 1 Drop(s) Right EYE every 4 hours  chlorhexidine 2% Cloths 1 Application(s) Topical <User Schedule>  doxazosin 8 milliGRAM(s) Oral at bedtime  petrolatum Ophthalmic Ointment 1 Application(s) Both EYES two times a day  senna 2 Tablet(s) Oral at bedtime    IVF:    CULTURES:  Culture Results:   Mixed gram negative rods including Moderate Stenotrophomonas maltophilia  Commensal iram consistent with body site (11-25 @ 00:19)  Culture Results:   No growth at 5 days (11-17 @ 05:59)    RADIOLOGY & ADDITIONAL TESTS:    AMS    Handoff    MEWS Score    Acute myocardial infarction    CVA (cerebral vascular accident)    Intracerebral hemorrhage of brain stem    Brainstem stroke    Brain stem stroke syndrome    Brain stem hemorrhage    Brain stem stroke syndrome    Hemorrhagic stroke    Brainstem stroke    Encephalopathy acute    Functional quadriplegia    Advanced care planning/counseling discussion    Encounter for palliative care    Pontine hemorrhage    Neurogenic dysphagia    Chronic respiratory failure    Acute kidney injury superimposed on CKD    Acute urinary retention    Hypertensive emergency    Sepsis, unspecified organism    Sepsis    Gram-negative bacteremia    Percutaneous tracheal puncture    Altered mental status examination    EGD, with PEG    AMS    [email or web address]        ASSESSMENT:  45 y/o PMH ?stroke/MI present to [facility] after collapsing at work. Decorticate posturing, vomiting, intubated for airway protection. 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 Transferred to [facility] for further management. s/p trach 10/14. s/p peg 10/21. Re-upgrade to ICU 2/2 desaturation event and suctioning requirements 11/15.     PLAN:  Neuro:  - neuro/vitals q4h, protected sleep time 10PM-5AM  - pain control: tylenol prn  - vEEG (10/3-4)- negative, (10/17-10/19) - negative  - CTH 9/30: enlarged pontine hemorrhage, CTH 10/3: stable, CTH 10/25: mostly resolved pontine hemorrhage  - MRI brain 10/12: parenchymal hemorrhage, acute/subacute R cerebellar stroke    - stroke core measures, stroke neuro signed off    CV:  - -160  - HTN: hold amlodipine 10mg, hold lisinopril 5mg   - echo (9/30) EF 75%    Resp:  - trach collar   - Secretions: duonebs/mucomyst/chest PT q8h   - CTA chest 11/13 negative for PE, inc ground glass opacities    GI:  - TF via PEG (placed 10/21 by gen surg)  - bowel regimen, last BM 11/30  - CTAP 11/8 negative for infection    - FOBT negative 11/23    Renal:  - IVL  - FW flushes 250cc q6hrs for uptrending BUN/Cr  - Urinary retenion: Gabriel replaced 11/28  - CKD: trend BUN/Cr  - renal US 10/1: echogenicity c/w chronic med renal dz, repeat 10/8: inc renal echogeicity, c/f medical renal disease w increased hydro     Endo:  - A1c 5.4    Heme:  - DVT ppx: SCDs, SQH 5000u q8h   - LE dopplers negative 11/11    ID:  - febrile, last pancx 11/16, MRSA swab neg 11/16   -  s/p Stenotrophomonas maltophilia PNA: s/p Bactrim (11/18-11/19) s/p Cefepime (11/16-11/18)  - 11/3, (+) sputum for stenotrophomas maltophlia, blood cx (+) klebsiella, cefepime 2gq12 (11/6 - 11/12)   - empiric tx: s/p zosyn (11/3-11/6) , s/p vanc  (11/3- 11/5)  - S/p Ancef (10/4-10/14) for PNA, and s/p Unasyn (10/18-10/23) +actinobacter baumanii    MISC:  - ophtho consult for keratitis, s/p erythromycin ointment to rt eye q4hrs, ofloxacin ointment to rt eye QID, artificial tears to rt eye q2hrs, moisture chamber at bedtime    Dispo: tele status, full code, pending placement and guardianship hearing     D/w Dr. D'Amico     Assessment:  Present when checked    []  GCS  E   V  M     Heart Failure: []Acute, [] acute on chronic , []chronic  Heart Failure:  [] Diastolic (HFpEF), [] Systolic (HFrEF), []Combined (HFpEF and HFrEF), [] RHF, [] Pulm HTN, [] Other    [] ANIKA, [] ATN, [] AIN, [] other  [] CKD1, [] CKD2, [] CKD 3, [] CKD 4, [] CKD 5, []ESRD    Encephalopathy: [] Metabolic, [] Hepatic, [] toxic, [] Neurological, [] Other    Abnormal Nurtitional Status: [] malnurtition (see nutrition note), [ ]underweight: BMI < 19, [] morbid obesity: BMI >40, [] Cachexia    [] Sepsis  [] hypovolemic shock,[] cardiogenic shock, [] hemorrhagic shock, [] neuogenic shock  [] Acute Respiratory Failure  []Cerebral edema, [] Brain compression/ herniation,   [] Functional quadriplegia  [] Acute blood loss anemia   HPI:  Unknown age male, unknown past medical history (reported stroke and MI by coworkers) presented to [facility] with AMS, Pt was working at [employer] when he was found down by coworkers. EMS called and pt brought to [facility] ED. Intubated, sedated, started on cardene for SBPs in 200s. CT head showed brain stem bleed. Transferred to NSICU for further management.  (30 Sep 2024 12:55)      Subjective:  GEORGE overnight.     Hospital course:  9/30: transferred from [facility]. A line placed. Versed dc'd. Cy Rader at bedside, states pt has family in [place], cannot confirm medications or PMH other than stroke and MI. 250cc bolus 3% given. LR switched to NS. hydralazine 25q8 started, 3% started, switched propofol to precedex   10/1: stability CTH done. Added labetalol, started TF. Palliative consulted. ethics consulted to determine surrogate. febrile 103, pan cx sent  10/2: BD 2, GEORGE overnight. TF resumed. Desatt'd to 80s, FiO2 inc. to 50. Fentanyl given, ABG, CXR ordered. Maxxed on precedex, started on propofol for DARIEN -4 - -5. Precedex dc'd. Duonebs, mucomyst, hypertonic added. 3% dc'd. Cardene dc'd. Start vanc/CTX. Increased labetalol 200q8. MRSA negative, dc'd vanc. ETT pulled back 2cm x 2, good positioning after confirmatory chest xray. Ethics attempting to establish HCP with family. Na 159, starting FW 250q6 for range 150-155.   10/3: BD3, GEORGE o/n, neuro stable. Na elevating, FW increased to 300q6. Dc'd bowel reg for diarrhea. vEEG started. SQH 5000q8 tonight.   10/4: BD 4, albumn bolus, incr. LR to 80 2/2 incr. in Cr, LR to 100cc/hr for uptrending Cr. Started 7% hypersal for 48hrs and SL atropine for inline/oral thick secretions. Dc'd CTX and started ancef for MSSA in the sputum. Nephrology consulted for CKD, f/u recs. SBP 170s, given hydralazine 10mg IVP.   10/5: BD5, o/n 10mg IVP hydralazine given for SBP 170s and started on hydralazine 25q8 via OGT. 10mg IV push labetalol for SBP > 160s. RT placed for diarrhea.   10/6: BD6, o/n FW increased to 350q4 per nephrology recs. IV tylenol for temp 100.6, SBp 160s presumed uncomfortable.   10/7: BD7, overnight pancultured for temp 101.8F.   10/8: BD8. GEORGE. Cr bumped. decreased LR to 75cc/hr. Adding simethicone ATC. incr hydralazine 50mgTID. Incr labetalol 300mgTID. Na 145, decreased FWF to 250q6. Start precedex. FENa consistent with intrinsic kidney injury. Pend repeat renal US. Retaining up to 1.3L, bladder scans q6, straight cath PRN  10/9: BD 9. GEORGE overnight. Neuro stable. abd xray for distention w non-specific gas pattern, OGT to LIWS for morning. duonebs/mucomyst to q8 for improving secretions. Changed tube feeds to Jevity 1.5 20cc/hr, low rate due to abdominal distention, nepro dense and more difficult to digest. Tolerating CPAP, confirmed by ABG.   10/10: BD 10. GEORGE overnight. Neuro stable. (+) gabriel for urinary retention on bladder scan. inc TF to goal rate of 40cc/hr. family leaning toward pursuing trach/PEG. 1/2 amp for FS 81.   10/11: BD 11. GEORGE overnight. Neuro stable. Trach/PEG consults placed.   10/12: BD 12. GEORGE overnight. Neuro stable. MRI brain complete.   10/13: BD 13. Increase flomax. Hold SQH after PM dose for trach tm. IVL.   10/14: BD 14. GEORGE overnight, remains on AC/VC. Gabriel placed for urinary retention. Dc'd free water.  S/p trach with pulm. NGT placed and CXR confirmed in good position.   10/15: BD 15, GEORGE ovn. resumed feeds. spiked 101, pan cx sent.   10/16: BD 16. GEORGE ovn. Lokelma 5mg for K+ 5.4. Started vanc q 24/zosyn for empiric PNA coverage, IVF to 100/hr. PEG held for fever.   10/17: BD 17,  ordered serum osm and urine osm for am. Started sinemet for neurostimulation. Increased cardura to 0.8. Started FW 100q4, dc'd IVF. MRSA negative, dc'd vanc. NGT replaced d/t coiling.   10/18: BD 18, GEORGE overnight, neuro stable. Amantadine added for neurostim. zosyn changed to unasyn for acinetobacter baumannii, failed TOV and required SC  10/19: BD 19, GEORGE ovn. cardura 2mg added for retention. labetalol decreased 200q8, hydralazine decreased 25q8. Gabriel replaced.   10/20: BD20, GEORGE overnight. NGT dislodged, replaced. PEG tomorrow w/ gen surg, FW increased to 150q4 and labetalol decreased to 100q8, lokelma given for hyperkalemia.   10/21: BD 21. POD0 PEG placement with Gen surg. decr labetolol to 50q8, incr. cardura to 0.4, started lokelma and phoslo, dc gabriel POD0 PEG placement with Gen surg.  10/22: BD 22. Plan to start TF today via PEG. dc labetalol, Following ophtho recs. Increased apnea settings - found to be in cheyne-moe respiration. CPAP 5/5.  10/23: BD 23. hydralazine d/c'd, trach collar trial today. Rectal tube placed at 6am.  10/24: BD24, o/n lokelma held due to diarrhea. Free water 100q6 resumed. dc'd tamsulosin, amantadine. Incr'd cardura to 8mg qhs. Dc'd FW. Switched jevity to nepro. gabriel placed for high urine output. Started SL atropine for oral secretions. Dc'd free water.  10/25: BD25, o/n decreased suctioning requirements to > q4hrs, GEORGE. Cr improving, cont phoslo, lokelma held at this time. Gabriel placed yest, cont. Tolerating trach collar. Given 500cc plasmalyte bolus for ANIKA. Dc'd sinemet.   10/26: BD26, o/n resumed lokelma 5mg daily and resumed 100cc free water q6hrs. Change in neuro status with new right pupillary dilation with anisocoria (right pupil 6mm fixed and left pupil 3mm briskly reactive). Given 23.4% NaCl bullet, taken for emergent CTH showing mostly resolved pontine hemorrhage, continued brainstem hypodensity likely edema d/t hemorrhage, no new hemorrhage or infarct, no herniation, mild increase in size of left lateral ventricle. Vitals remaine stable. Na goal > 140.   10/27: BD27, o/n GEORGE.Neuro stable. Pend stepdown with airway bed.   10/28: BD 28. GEORGE overnight. Neuro stable. Miralax ordered. Gabriel removed, pending TOV.  10/29: BD 29. GEORGE o/n. Given 2L NS over 8 hrs for increased BUN/Cr ratio. Gabriel placed for frequent straight cath.   10/30: BD 30.   10/31: BD 31. GEORGE overnight. Na 149, increased free water to 200q6. 1L NS for uptrending BUN.   11/1: BD 32. GEORGE overnight. Given 1L NS for dehydration. Na 146, increased FW to 250q6.   11/2: BD 33 GEORGE overnight, neuro stable, given 500cc bolus for net negative status and tachycardia   11/3: BD 34, GEORGE overnight, neuro stable. Patient remains tachycardic, EKG showing sinus tachycardia, given additional 500cc NS bolus. Febrile to 101.9F, pan cultured (without UA), CXR WNL, given tylenol.   11/4: BD 35, GEORGE overnight, neuro stable. Given 1L NS for tachycardia. sputum (+) for stenotropohomas maltophilia.   11/5: BD 36 GEORGE overnight, neuro stable. Vancomycin dc'd. Chest PT BID. ID consulted, cont zosyn.  11/6: BD 37. blood cx + klebsiella dc zosyn changed to cefepime, CTAP ordered, rpt blood cx sent.    11/7: BD 38. Pending CT A/P, given 250cc bolus and starting maintenance fluids overnight. Pending CT A/P after bolus   11/8: BD 39. CT CAP negative for infection.   11/9: BD 40. GEORGE overnight.  11/10: BD 41. GEORGE overnight. desat to 85 on trach collar, O2 inc to 10L and 100%, O2 sat inc to 95. pt tachy to 110s, euvolemic. given tylenol. ABG and CXR ordered. spiked fever, pancultured, RVP negative. AM ABG w pO2 79, rpt w pO2 79. pt appears comfortable, satting 94%.   11/11: BD 42. GEORGE overnight. pt became tachy to 130s, desat to 90 on 100% FiO2 and 10L. suctioned, (+) productive cough. temp 101.4, given 1g IV tylenol and 500cc NS bolus for euvolemia. fever and HR downtrending. LE dopplers negative for dvt  11/12: BD 43, GEORGE ovn, fever and HR downtrending, satting 97% 70% FIO2  11/13: BD 44, GEORGE ovn. started standing tylenol x24 hours for tachycardia. desat to 80s, o2 increased. CXR stable, pending CTA PE protocol.   11/14: BD 45, GEORGE overnight, neuro stable. resp therapy dec FiO2 to 70%.   11/15: BD 46, GEORGE overnight, neuro stable.  Rapid called for desaturation 30s, tachycardic 140s. Patient bagged, 100% fio2, heavily suctioned. CXR/POCUS unremarkable. ABG c/w desaturation. WBC 14.71. Afebrile. O2 improved to 90s and patient upgraded to ICU. ABG paO2 30s improved to 89 on vent. IV Tylenol x 1, sputum sent. Start protonix while o-n vent.   11/16: BD 47. POCUS showed collapsable IVCF, given 1L bolus. Vanco/Cefpime added empriic for PNA, NGT feeds restarted. MRSA swab neg, Vanc DC'd.   11/17: BD 48. GEORGE overnight. 1l bolus for tachycardia. Spiked to 101, cultured. 500cc bolus for tachycardia, tachy to 148 given 25mcg fentanyl, 250cc albumin, 1.5L bolus. 5 IV lopressor with response HR to 100s. +Stenotrophomonas on sputum cx.   11/18: BD 49. GEORGE overnight. Consulted ID, cefepime switched to bactrim x7days. Started hydrochlorothiazine 12.5mg daily.  11/19: BD 50. Tachy 120s, given tylenol and 500cc NS. Tolerating 5/5, switched to TCx. Holding phos binder. D/c Bactrim. D/c gabriel, f/u TOV. Dc'd PPI.   11/20: BD 51. GEORGE ovn. 1600 satting low 90s, mildly tachy to 110s, afebrile, RR wnl. O2 improved to mid 90s while inc O2 to 100% on TCx. CPAP 5/5 placed back on.  11/21: BD 52, GEORGE ovn, tolerating CPAP 5/5. Switch to trach collar during the day if tolerating well. HCTZ held for Cr bump, straight cath frequence increased to q4  11/22: BD 53, GEORGE ovn. Resumed phoslo. Gabriel placed. Resumed HCTZ.   11/23: BD 54. Holding tylenol in setting of possible fever, will require pan cx if febrile. Cr improved today. Cont CPAP. Bowel regimen held i/s/o diarrhea. FOBT negative.  11/24: BD 55. GEORGE overnight. Neuro stable. HCTZ dc'ed, started lisinopril 5. Lokelma dc'ed for K 3.7.   11/25: BD 56, GEORGE overnight. Neuro stable. dc'd lisinopril 5mg. Gabriel dc'd. TOV. 1545 noted to be hypotensive, MAP 50, in supine position on chair, HR 60s, afebrile, O2 96%. Given 1L cc NS bolus, placed back on bed in reverse trendelenberg, improved to map of 66. Neostick at bedside. Vitals check q1h. Dc'ed amlodipine. Failed TOV, bladder scan q6, sc prn. Added back Senna.   11/26: BD 57, GEORGE overnight. Neuro stable. Dc'd phoslo.   11/27: BD 58, GEORGE overnight. Neuro stable.    11/28: BD 59. Gabriel replaced.   11/29: GEORGE.  11/30: GEORGE, neuro stable.   12/1: BD 62, GEORGE overnight      Vital Signs Last 24 Hrs  T(C): 37.3 (30 Nov 2024 22:06), Max: 37.3 (30 Nov 2024 22:06)  T(F): 99.1 (30 Nov 2024 22:06), Max: 99.1 (30 Nov 2024 22:06)  HR: 92 (01 Dec 2024 00:12) (64 - 92)  BP: 112/78 (01 Dec 2024 00:12) (112/78 - 145/94)  BP(mean): 90 (01 Dec 2024 00:12) (90 - 115)  RR: 16 (01 Dec 2024 00:12) (16 - 18)  SpO2: 93% (01 Dec 2024 00:12) (93% - 100%)    Parameters below as of 01 Dec 2024 00:12  Patient On (Oxygen Delivery Method): tracheostomy collar  O2 Flow (L/min): 10  O2 Concentration (%): 40    I&O's Summary    29 Nov 2024 07:01  -  30 Nov 2024 07:00  --------------------------------------------------------  IN: 1770 mL / OUT: 1325 mL / NET: 445 mL    30 Nov 2024 07:01  -  01 Dec 2024 00:18  --------------------------------------------------------  IN: 1430 mL / OUT: 1075 mL / NET: 355 mL        PHYSICAL EXAM:  General: patient seen laying supine in bed in NAD  Neuro: OEs to voice, A&Ox0, R hand 2/5 and R triceps 3/5 to command, RLE withdraws to noxious, LLE trace withdrawal, LUE 0/5  HEENT: PERRL, only tracks vertically, +trach collar  Neck: supple  Cardiac: RRR, S1S2  Pulmonary: chest rise symmetric  Abdomen: soft, nontender, nondistended, +PEG  Ext: perfusing well  Skin: warm, dry    LABS:                        11.4   8.09  )-----------( 217      ( 30 Nov 2024 05:30 )             35.4     11-30    138  |  103  |  62[H]  ----------------------------<  127[H]  3.9   |  23  |  1.29    Ca    9.3      30 Nov 2024 05:30  Phos  3.8     11-30  Mg     2.1     11-30        Urinalysis Basic - ( 30 Nov 2024 05:30 )    Color: x / Appearance: x / SG: x / pH: x  Gluc: 127 mg/dL / Ketone: x  / Bili: x / Urobili: x   Blood: x / Protein: x / Nitrite: x   Leuk Esterase: x / RBC: x / WBC x   Sq Epi: x / Non Sq Epi: x / Bacteria: x          CAPILLARY BLOOD GLUCOSE          Drug Levels: [] N/A    CSF Analysis: [] N/A      Allergies    Allergy Status Unknown    Intolerances      MEDICATIONS:  Antibiotics:    Neuro:  acetaminophen   Oral Liquid .. 650 milliGRAM(s) Oral every 6 hours PRN    Anticoagulation:  heparin   Injectable 5000 Unit(s) SubCutaneous every 8 hours    OTHER:  acetylcysteine 10%  Inhalation 4 milliLiter(s) Inhalation every 8 hours  albuterol/ipratropium for Nebulization 3 milliLiter(s) Nebulizer every 8 hours  artificial tears (preservative free) Ophthalmic Solution 1 Drop(s) Right EYE every 4 hours  chlorhexidine 2% Cloths 1 Application(s) Topical <User Schedule>  doxazosin 8 milliGRAM(s) Oral at bedtime  petrolatum Ophthalmic Ointment 1 Application(s) Both EYES two times a day  senna 2 Tablet(s) Oral at bedtime    IVF:    CULTURES:  Culture Results:   Mixed gram negative rods including Moderate Stenotrophomonas maltophilia  Commensal iram consistent with body site (11-25 @ 00:19)  Culture Results:   No growth at 5 days (11-17 @ 05:59)    RADIOLOGY & ADDITIONAL TESTS:    AMS    Handoff    MEWS Score    Acute myocardial infarction    CVA (cerebral vascular accident)    Intracerebral hemorrhage of brain stem    Brainstem stroke    Brain stem stroke syndrome    Brain stem hemorrhage    Brain stem stroke syndrome    Hemorrhagic stroke    Brainstem stroke    Encephalopathy acute    Functional quadriplegia    Advanced care planning/counseling discussion    Encounter for palliative care    Pontine hemorrhage    Neurogenic dysphagia    Chronic respiratory failure    Acute kidney injury superimposed on CKD    Acute urinary retention    Hypertensive emergency    Sepsis, unspecified organism    Sepsis    Gram-negative bacteremia    Percutaneous tracheal puncture    Altered mental status examination    EGD, with PEG    AMS    [email or web address]        ASSESSMENT:  45 y/o PMH ?stroke/MI present to [facility] after collapsing at work. Decorticate posturing, vomiting, intubated for airway protection. 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 Transferred to [facility] for further management. s/p trach 10/14. s/p peg 10/21. Re-upgrade to ICU 2/2 desaturation event and suctioning requirements 11/15.     PLAN:  Neuro:  - neuro/vitals q4h, protected sleep time 10PM-5AM  - pain control: tylenol prn  - vEEG (10/3-4)- negative, (10/17-10/19) - negative  - CTH 9/30: enlarged pontine hemorrhage, CTH 10/3: stable, CTH 10/25: mostly resolved pontine hemorrhage  - MRI brain 10/12: parenchymal hemorrhage, acute/subacute R cerebellar stroke    - stroke core measures, stroke neuro signed off    CV:  - -160  - HTN: hold amlodipine 10mg, hold lisinopril 5mg   - echo (9/30) EF 75%    Resp:  - trach collar   - Secretions: duonebs/mucomyst/chest PT q8h   - CTA chest 11/13 negative for PE, inc ground glass opacities    GI:  - TF via PEG (placed 10/21 by gen surg)  - bowel regimen, last BM 11/30  - CTAP 11/8 negative for infection    - FOBT negative 11/23    Renal:  - IVL  - FW flushes 250cc q6hrs for uptrending BUN/Cr  - Urinary retenion: Gabriel replaced 11/28  - CKD: trend BUN/Cr  - renal US 10/1: echogenicity c/w chronic med renal dz, repeat 10/8: inc renal echogeicity, c/f medical renal disease w increased hydro     Endo:  - A1c 5.4    Heme:  - DVT ppx: SCDs, SQH 5000u q8h   - LE dopplers negative 11/11    ID:  - febrile, last pancx 11/16, MRSA swab neg 11/16   -  s/p Stenotrophomonas maltophilia PNA: s/p Bactrim (11/18-11/19) s/p Cefepime (11/16-11/18)  - 11/3, (+) sputum for stenotrophomas maltophlia, blood cx (+) klebsiella, cefepime 2gq12 (11/6 - 11/12)   - empiric tx: s/p zosyn (11/3-11/6) , s/p vanc  (11/3- 11/5)  - S/p Ancef (10/4-10/14) for PNA, and s/p Unasyn (10/18-10/23) +actinobacter baumanii    MISC:  - ophtho consult for keratitis, s/p erythromycin ointment to rt eye q4hrs, ofloxacin ointment to rt eye QID, artificial tears to rt eye q2hrs, moisture chamber at bedtime    Dispo: tele status, full code, pending placement and guardianship hearing     D/w Dr. D'Amico     Assessment:  Present when checked    []  GCS  E   V  M     Heart Failure: []Acute, [] acute on chronic , []chronic  Heart Failure:  [] Diastolic (HFpEF), [] Systolic (HFrEF), []Combined (HFpEF and HFrEF), [] RHF, [] Pulm HTN, [] Other    [] ANIKA, [] ATN, [] AIN, [] other  [] CKD1, [] CKD2, [] CKD 3, [] CKD 4, [] CKD 5, []ESRD    Encephalopathy: [] Metabolic, [] Hepatic, [] toxic, [] Neurological, [] Other    Abnormal Nurtitional Status: [] malnurtition (see nutrition note), [ ]underweight: BMI < 19, [] morbid obesity: BMI >40, [] Cachexia    [] Sepsis  [] hypovolemic shock,[] cardiogenic shock, [] hemorrhagic shock, [] neuogenic shock  [] Acute Respiratory Failure  []Cerebral edema, [] Brain compression/ herniation,   [] Functional quadriplegia  [] Acute blood loss anemia

## 2024-12-01 NOTE — PROGRESS NOTE ADULT - TIME BILLING
Time exclusive of ACP discussion  Time inclusive of chart review, medication ordering, discussion with primary team, clinical documentation.

## 2024-12-01 NOTE — PROGRESS NOTE ADULT - ASSESSMENT
44 YOF with unclear PMH (?stroke, MI) who was found down at work, intubated for airway protection and found to have acute parenchymal hemorrhage within nabil with mass effect (+ acute/subacute right cerebellar infarct) in setting of hypertensive emergency, transferred to [facility] for further neurosurgical care. Hospital course c/b poor neurologic recovery s/p trach-PEG, AUR s/p gabriel, ANIKA on CKD c/b hyperkalemia, HAP s/p amp-sulbactam (EOT 10/23), K aerogenes bacte    # Pontine hemorrhage.   -The pt was found to have acute parenchymal hemorrhage within nabil with mass effect (+ acute/subacute right cerebellar infarct) in setting of hypertensive emergency.   -The pt had an MRI brain also demonstrated acute/subacute R cerebellar stroke.   - Will continue management per NSGY    # Hypertension  -Will continue amlodipine 10mg daily     #Acute kidney injury superimposed on CKD.   -Pt s/p US renal with chronic medical renal disease. Hospital course c/b ATN.   - Will continue to renally dose medications and avoid nephrotoxins      # Chronic respiratory failure.   -Pt s/p percutaneous trach by pulm on 10/14/24  - Continue  trach collar   - Continue routine trach care and suctioning PRN  - Chest PT      # Neurogenic dysphagia.   -Pt s/pPEG with surgery 10/21/24  - TF per nutrition  - aspiration precautions and elevation of HOB.    #Acute urinary retention.   -Pt s/p gabriel placement  10/24 for urinary retention, failed TOV   - doxazosin 8mg.    # Functional quadriplegia in  setting of brainstem hemorrhage  - decub precautions  -care per nursing protocol     #DVT prop  -Continue Heparin SQ q8     #DISPO  -TBD - pending PRUCOL application

## 2024-12-02 LAB
ANION GAP SERPL CALC-SCNC: 11 MMOL/L — SIGNIFICANT CHANGE UP (ref 5–17)
BUN SERPL-MCNC: 64 MG/DL — HIGH (ref 7–23)
CALCIUM SERPL-MCNC: 9.3 MG/DL — SIGNIFICANT CHANGE UP (ref 8.4–10.5)
CHLORIDE SERPL-SCNC: 102 MMOL/L — SIGNIFICANT CHANGE UP (ref 96–108)
CO2 SERPL-SCNC: 23 MMOL/L — SIGNIFICANT CHANGE UP (ref 22–31)
CREAT SERPL-MCNC: 1.32 MG/DL — HIGH (ref 0.5–1.3)
EGFR: 67 ML/MIN/1.73M2 — SIGNIFICANT CHANGE UP
EGFR: 67 ML/MIN/1.73M2 — SIGNIFICANT CHANGE UP
GLUCOSE SERPL-MCNC: 121 MG/DL — HIGH (ref 70–99)
HCT VFR BLD CALC: 35.4 % — LOW (ref 39–50)
HGB BLD-MCNC: 11.5 G/DL — LOW (ref 13–17)
MAGNESIUM SERPL-MCNC: 2.3 MG/DL — SIGNIFICANT CHANGE UP (ref 1.6–2.6)
MCHC RBC-ENTMCNC: 30.3 PG — SIGNIFICANT CHANGE UP (ref 27–34)
MCHC RBC-ENTMCNC: 32.5 G/DL — SIGNIFICANT CHANGE UP (ref 32–36)
MCV RBC AUTO: 93.2 FL — SIGNIFICANT CHANGE UP (ref 80–100)
NRBC # BLD: 0 /100 WBCS — SIGNIFICANT CHANGE UP (ref 0–0)
NRBC BLD-RTO: 0 /100 WBCS — SIGNIFICANT CHANGE UP (ref 0–0)
PHOSPHATE SERPL-MCNC: 3.6 MG/DL — SIGNIFICANT CHANGE UP (ref 2.5–4.5)
PLATELET # BLD AUTO: 214 K/UL — SIGNIFICANT CHANGE UP (ref 150–400)
POTASSIUM SERPL-MCNC: 4 MMOL/L — SIGNIFICANT CHANGE UP (ref 3.5–5.3)
POTASSIUM SERPL-SCNC: 4 MMOL/L — SIGNIFICANT CHANGE UP (ref 3.5–5.3)
RBC # BLD: 3.8 M/UL — LOW (ref 4.2–5.8)
RBC # FLD: 14.4 % — SIGNIFICANT CHANGE UP (ref 10.3–14.5)
SODIUM SERPL-SCNC: 136 MMOL/L — SIGNIFICANT CHANGE UP (ref 135–145)
WBC # BLD: 10.11 K/UL — SIGNIFICANT CHANGE UP (ref 3.8–10.5)
WBC # FLD AUTO: 10.11 K/UL — SIGNIFICANT CHANGE UP (ref 3.8–10.5)

## 2024-12-02 PROCEDURE — 99232 SBSQ HOSP IP/OBS MODERATE 35: CPT

## 2024-12-02 RX ORDER — SODIUM CHLORIDE 9 G/1000ML
1000 INJECTION, SOLUTION INTRAVENOUS ONCE
Refills: 0 | Status: COMPLETED | OUTPATIENT
Start: 2024-12-02 | End: 2024-12-02

## 2024-12-02 RX ADMIN — SODIUM CHLORIDE 100 MILLILITER(S): 9 INJECTION, SOLUTION INTRAVENOUS at 12:47

## 2024-12-02 RX ADMIN — IPRATROPIUM BROMIDE AND ALBUTEROL SULFATE 3 MILLILITER(S): .5; 2.5 SOLUTION RESPIRATORY (INHALATION) at 14:55

## 2024-12-02 RX ADMIN — Medication 1 APPLICATION(S): at 05:28

## 2024-12-02 RX ADMIN — Medication 1 DROP(S): at 05:22

## 2024-12-02 RX ADMIN — Medication 1 APPLICATION(S): at 05:23

## 2024-12-02 RX ADMIN — Medication 1 DROP(S): at 10:17

## 2024-12-02 RX ADMIN — Medication 2 TABLET(S): at 22:45

## 2024-12-02 RX ADMIN — Medication 1 DROP(S): at 02:26

## 2024-12-02 RX ADMIN — ACETYLCYSTEINE 4 MILLILITER(S): 200 INHALANT RESPIRATORY (INHALATION) at 22:44

## 2024-12-02 RX ADMIN — ACETYLCYSTEINE 4 MILLILITER(S): 200 INHALANT RESPIRATORY (INHALATION) at 14:55

## 2024-12-02 RX ADMIN — HEPARIN SODIUM 5000 UNIT(S): 1000 INJECTION INTRAVENOUS; SUBCUTANEOUS at 05:23

## 2024-12-02 RX ADMIN — Medication 1 DROP(S): at 18:08

## 2024-12-02 RX ADMIN — IPRATROPIUM BROMIDE AND ALBUTEROL SULFATE 3 MILLILITER(S): .5; 2.5 SOLUTION RESPIRATORY (INHALATION) at 05:22

## 2024-12-02 RX ADMIN — Medication 1 DROP(S): at 22:44

## 2024-12-02 RX ADMIN — Medication 1 DROP(S): at 14:55

## 2024-12-02 RX ADMIN — ACETYLCYSTEINE 4 MILLILITER(S): 200 INHALANT RESPIRATORY (INHALATION) at 05:22

## 2024-12-02 RX ADMIN — DOXAZOSIN MESYLATE 8 MILLIGRAM(S): 8 TABLET ORAL at 22:45

## 2024-12-02 RX ADMIN — IPRATROPIUM BROMIDE AND ALBUTEROL SULFATE 3 MILLILITER(S): .5; 2.5 SOLUTION RESPIRATORY (INHALATION) at 22:44

## 2024-12-02 RX ADMIN — Medication 1 APPLICATION(S): at 18:09

## 2024-12-02 RX ADMIN — HEPARIN SODIUM 5000 UNIT(S): 1000 INJECTION INTRAVENOUS; SUBCUTANEOUS at 14:55

## 2024-12-02 RX ADMIN — HEPARIN SODIUM 5000 UNIT(S): 1000 INJECTION INTRAVENOUS; SUBCUTANEOUS at 22:44

## 2024-12-02 NOTE — PROGRESS NOTE ADULT - SUBJECTIVE AND OBJECTIVE BOX
Patient is a 44y old  Male who presents with a chief complaint of brain stem bleed (01 Dec 2024 00:18)      SUBJECTIVE / OVERNIGHT EVENTS:  Non Verbal , does not track  , Nursing staff report no new events overnight    Last Bowel Movement: 30-Nov-2024 (12-02-24 @ 08:03)  Last Bowel Movement: 30-Nov-2024 (12-01-24 @ 20:35)  Last Bowel Movement: 30-Nov-2024 (12-01-24 @ 08:54)        MEDICATIONS  (STANDING):  acetylcysteine 10%  Inhalation 4 milliLiter(s) Inhalation every 8 hours  albuterol/ipratropium for Nebulization 3 milliLiter(s) Nebulizer every 8 hours  artificial tears (preservative free) Ophthalmic Solution 1 Drop(s) Right EYE every 4 hours  chlorhexidine 2% Cloths 1 Application(s) Topical <User Schedule>  doxazosin 8 milliGRAM(s) Oral at bedtime  heparin   Injectable 5000 Unit(s) SubCutaneous every 8 hours  lactated ringers Bolus 1000 milliLiter(s) IV Bolus once  petrolatum Ophthalmic Ointment 1 Application(s) Both EYES two times a day  senna 2 Tablet(s) Oral at bedtime    MEDICATIONS  (PRN):  acetaminophen   Oral Liquid .. 650 milliGRAM(s) Oral every 6 hours PRN Temp greater or equal to 38.5C (101.3F), Mild Pain (1 - 3)    Vital Signs Last 24 Hrs  T(C): 36.7 (02 Dec 2024 09:00), Max: 36.7 (01 Dec 2024 14:07)  T(F): 98.1 (02 Dec 2024 09:00), Max: 98.1 (02 Dec 2024 09:00)  HR: 81 (02 Dec 2024 12:00) (76 - 94)  BP: 137/98 (02 Dec 2024 12:00) (115/87 - 137/98)  BP(mean): 113 (02 Dec 2024 12:00) (98 - 113)  RR: 18 (02 Dec 2024 12:00) (18 - 18)  SpO2: 96% (02 Dec 2024 12:00) (91% - 100%)    Parameters below as of 02 Dec 2024 12:00  Patient On (Oxygen Delivery Method): tracheostomy collar  O2 Flow (L/min): 10  O2 Concentration (%): 40          PHYSICAL EXAM:    General: awake, not tracking, looking comfortable, no labored breathing on trach collar  HEENT: tracheostomy clean  Lungs: poor inspiration, no crackles, no wheezes  Heart: regular  Abdomen: soft, no visible tenderness, + BS  Extremities: warm, no edema, not following commands    LABS:                                   11.5   10.11 )-----------( 214      ( 02 Dec 2024 05:30 )             35.4     12-02    136  |  102  |  64[H]  ----------------------------<  121[H]  4.0   |  23  |  1.32[H]    Ca    9.3      02 Dec 2024 05:30  Phos  3.6     12-02  Mg     2.3     12-02      Sq Epi: x / Non Sq Epi: x / Bacteria: x        RADIOLOGY & ADDITIONAL TESTS:    Imaging Personally Reviewed:    Consultant(s) Notes Reviewed:      Care Discussed with Consultants/Other Providers:

## 2024-12-02 NOTE — PROGRESS NOTE ADULT - ASSESSMENT
44 YOF with unclear PMH (?stroke, MI) who was found down at work, intubated for airway protection and found to have acute parenchymal hemorrhage within nabil with mass effect (+ acute/subacute right cerebellar infarct) in setting of hypertensive emergency, transferred to [facility] for further neurosurgical care. Hospital course c/b poor neurologic recovery s/p trach-PEG, AUR s/p gabriel, ANIKA on CKD c/b hyperkalemia, HAP s/p amp-sulbactam (EOT 10/23), K aerogenes bacteremia  treated with 2 weeks of Cefepime per ID , On 11/15 he became septic and was transferred to NSICU due to increased o2 requirements and needed Vent support , Trach Cultures + for Stenotrophomonas which was treated with 7 days of Bactrim per ID , has been weaned of Vent since 11/23 and is not on 10lts at 40% o2 through Trach Collar     # Pontine hemorrhage  # Encephalopathy due to Intracranial Bleed   - Acute parenchymal hemorrhage within nabil with mass effect (+ acute/subacute right cerebellar infarct) in setting of hypertensive emergency.   - MRI brain also demonstrated acute/subacute R cerebellar stroke.   - No Neurosurgical Interventions were performed   - due to IPH and Cerebellar stroke patient has become Trach and Peg Dependent    # Hypertension  - Currently Normotensive , not on Any Anti HTN agents , IF BP Consistently > 140/90 can restart amlodipine 5mg daily     # Acute kidney injury  # Acute Tubular Necrosis superimposed on CKD. 3  - Pt s/p US renal with chronic medical renal disease  - Elevated BUN , Not on any meds that can elevate BUN , Hgb Stable   - 1 Litre LR bolus on 12/1     # Chronic respiratory failure.   -Pt s/p percutaneous trach by pulm on 10/14/24  - Continue  trach collar   - Continue routine trach care and suctioning PRN  - Chest PT      # Neurogenic dysphagia.   -Pt s/pPEG with surgery 10/21/24  - TF per nutrition  - aspiration precautions and elevation of HOB.    #Acute urinary retention.   - doxazosin 8mg  - Failure TOV on 11/25/24     # Functional quadriplegia in  setting of brainstem hemorrhage  - decub precautions  - care per nursing protocol     # DVT prop  -Continue Heparin SQ q8     # Dispo : Patient is from [place] , Jason Aguayo [phone]   SW - awaiting PRUCOL to Apply for Medicaid and  Transfer to [facility]

## 2024-12-03 LAB
ANION GAP SERPL CALC-SCNC: 13 MMOL/L — SIGNIFICANT CHANGE UP (ref 5–17)
BUN SERPL-MCNC: 60 MG/DL — HIGH (ref 7–23)
CALCIUM SERPL-MCNC: 9.2 MG/DL — SIGNIFICANT CHANGE UP (ref 8.4–10.5)
CHLORIDE SERPL-SCNC: 101 MMOL/L — SIGNIFICANT CHANGE UP (ref 96–108)
CO2 SERPL-SCNC: 23 MMOL/L — SIGNIFICANT CHANGE UP (ref 22–31)
CREAT SERPL-MCNC: 1.22 MG/DL — SIGNIFICANT CHANGE UP (ref 0.5–1.3)
EGFR: 74 ML/MIN/1.73M2 — SIGNIFICANT CHANGE UP
EGFR: 74 ML/MIN/1.73M2 — SIGNIFICANT CHANGE UP
GLUCOSE SERPL-MCNC: 120 MG/DL — HIGH (ref 70–99)
HCT VFR BLD CALC: 33.9 % — LOW (ref 39–50)
HGB BLD-MCNC: 10.9 G/DL — LOW (ref 13–17)
MAGNESIUM SERPL-MCNC: 2.1 MG/DL — SIGNIFICANT CHANGE UP (ref 1.6–2.6)
MCHC RBC-ENTMCNC: 29.9 PG — SIGNIFICANT CHANGE UP (ref 27–34)
MCHC RBC-ENTMCNC: 32.2 G/DL — SIGNIFICANT CHANGE UP (ref 32–36)
MCV RBC AUTO: 93.1 FL — SIGNIFICANT CHANGE UP (ref 80–100)
NRBC # BLD: 0 /100 WBCS — SIGNIFICANT CHANGE UP (ref 0–0)
NRBC BLD-RTO: 0 /100 WBCS — SIGNIFICANT CHANGE UP (ref 0–0)
PHOSPHATE SERPL-MCNC: 3.5 MG/DL — SIGNIFICANT CHANGE UP (ref 2.5–4.5)
PLATELET # BLD AUTO: 204 K/UL — SIGNIFICANT CHANGE UP (ref 150–400)
POTASSIUM SERPL-MCNC: 3.5 MMOL/L — SIGNIFICANT CHANGE UP (ref 3.5–5.3)
POTASSIUM SERPL-SCNC: 3.5 MMOL/L — SIGNIFICANT CHANGE UP (ref 3.5–5.3)
RBC # BLD: 3.64 M/UL — LOW (ref 4.2–5.8)
RBC # FLD: 14.4 % — SIGNIFICANT CHANGE UP (ref 10.3–14.5)
SODIUM SERPL-SCNC: 137 MMOL/L — SIGNIFICANT CHANGE UP (ref 135–145)
WBC # BLD: 7.46 K/UL — SIGNIFICANT CHANGE UP (ref 3.8–10.5)
WBC # FLD AUTO: 7.46 K/UL — SIGNIFICANT CHANGE UP (ref 3.8–10.5)

## 2024-12-03 PROCEDURE — 99232 SBSQ HOSP IP/OBS MODERATE 35: CPT

## 2024-12-03 RX ORDER — ERYTHROMYCIN 5 MG/G
1 OINTMENT OPHTHALMIC AT BEDTIME
Refills: 0 | Status: DISCONTINUED | OUTPATIENT
Start: 2024-12-03 | End: 2025-02-07

## 2024-12-03 RX ORDER — OFLOXACIN 3 MG/ML
1 SOLUTION OPHTHALMIC
Refills: 0 | Status: DISCONTINUED | OUTPATIENT
Start: 2024-12-03 | End: 2025-02-13

## 2024-12-03 RX ADMIN — ACETYLCYSTEINE 4 MILLILITER(S): 200 INHALANT RESPIRATORY (INHALATION) at 21:57

## 2024-12-03 RX ADMIN — DOXAZOSIN MESYLATE 8 MILLIGRAM(S): 8 TABLET ORAL at 21:58

## 2024-12-03 RX ADMIN — ERYTHROMYCIN 1 APPLICATION(S): 5 OINTMENT OPHTHALMIC at 21:58

## 2024-12-03 RX ADMIN — Medication 1 DROP(S): at 17:46

## 2024-12-03 RX ADMIN — Medication 40 MILLIEQUIVALENT(S): at 07:57

## 2024-12-03 RX ADMIN — IPRATROPIUM BROMIDE AND ALBUTEROL SULFATE 3 MILLILITER(S): .5; 2.5 SOLUTION RESPIRATORY (INHALATION) at 06:13

## 2024-12-03 RX ADMIN — ACETYLCYSTEINE 4 MILLILITER(S): 200 INHALANT RESPIRATORY (INHALATION) at 05:39

## 2024-12-03 RX ADMIN — HEPARIN SODIUM 5000 UNIT(S): 1000 INJECTION INTRAVENOUS; SUBCUTANEOUS at 21:57

## 2024-12-03 RX ADMIN — HEPARIN SODIUM 5000 UNIT(S): 1000 INJECTION INTRAVENOUS; SUBCUTANEOUS at 05:40

## 2024-12-03 RX ADMIN — HEPARIN SODIUM 5000 UNIT(S): 1000 INJECTION INTRAVENOUS; SUBCUTANEOUS at 13:38

## 2024-12-03 RX ADMIN — Medication 1 DROP(S): at 06:12

## 2024-12-03 RX ADMIN — Medication 1 DROP(S): at 10:31

## 2024-12-03 RX ADMIN — Medication 2 TABLET(S): at 21:57

## 2024-12-03 RX ADMIN — IPRATROPIUM BROMIDE AND ALBUTEROL SULFATE 3 MILLILITER(S): .5; 2.5 SOLUTION RESPIRATORY (INHALATION) at 21:58

## 2024-12-03 RX ADMIN — OFLOXACIN 1 DROP(S): 3 SOLUTION OPHTHALMIC at 17:47

## 2024-12-03 RX ADMIN — Medication 1 APPLICATION(S): at 05:39

## 2024-12-03 RX ADMIN — Medication 1 DROP(S): at 13:37

## 2024-12-03 RX ADMIN — ACETYLCYSTEINE 4 MILLILITER(S): 200 INHALANT RESPIRATORY (INHALATION) at 13:37

## 2024-12-03 RX ADMIN — Medication 1 DROP(S): at 21:57

## 2024-12-03 RX ADMIN — Medication 1 APPLICATION(S): at 17:46

## 2024-12-03 RX ADMIN — IPRATROPIUM BROMIDE AND ALBUTEROL SULFATE 3 MILLILITER(S): .5; 2.5 SOLUTION RESPIRATORY (INHALATION) at 13:37

## 2024-12-03 RX ADMIN — Medication 1 APPLICATION(S): at 05:41

## 2024-12-03 NOTE — CHART NOTE - NSCHARTNOTEFT_GEN_A_CORE
Admitting Diagnosis:   Patient is a 44y old  Male who presents with a chief complaint of brain stem bleed (03 Dec 2024 11:29)  PAST MEDICAL & SURGICAL HISTORY:  Acute myocardial infarction  CVA (cerebral vascular accident)      Current Nutrition Order:  Maribeth Sohail Renal Support 1.8: at goal of 60mL/hour x 18 hours, providing 1080mL total volume, 1944 calories, 86g protein, ~713mL free water; this meets ~24 calories/kg ideal body weight, ~1.1g protein/kg ideal body weight; with Banatrol Tube Feeding 2x/day    PO Intake: NPO with tube feeds meeting 100% estimated nutrient needs    GI Issues: soft/nontender abdomen, no noted distention per nursing; BM on 11/30/24    Pain: Absence of non-verbal indicators of pain    Skin Integrity: intact; no pressure ulcers, no noted edema; PEG present; Mookie: Kylee      12-02-24 @ 07:01  -  12-03-24 @ 07:00  --------------------------------------------------------  IN: 2020 mL / OUT: 1370 mL / NET: 650 mL    12-03-24 @ 07:01  -  12-03-24 @ 14:26  --------------------------------------------------------  IN: 550 mL / OUT: 300 mL / NET: 250 mL      Labs:   12-03    137  |  101  |  60[H]  ----------------------------<  120[H]  3.5   |  23  |  1.22    Ca    9.2      03 Dec 2024 05:30  Phos  3.5     12-03  Mg     2.1     12-03    CAPILLARY BLOOD GLUCOSE    Medications:  MEDICATIONS  (STANDING):  acetylcysteine 10%  Inhalation 4 milliLiter(s) Inhalation every 8 hours  albuterol/ipratropium for Nebulization 3 milliLiter(s) Nebulizer every 8 hours  artificial tears (preservative free) Ophthalmic Solution 1 Drop(s) Right EYE every 4 hours  chlorhexidine 2% Cloths 1 Application(s) Topical <User Schedule>  doxazosin 8 milliGRAM(s) Oral at bedtime  erythromycin   Ointment 1 Application(s) Right EYE at bedtime  heparin   Injectable 5000 Unit(s) SubCutaneous every 8 hours  ofloxacin 0.3% Solution 1 Drop(s) Right EYE four times a day  petrolatum Ophthalmic Ointment 1 Application(s) Both EYES two times a day  senna 2 Tablet(s) Oral at bedtime    MEDICATIONS  (PRN):  acetaminophen   Oral Liquid .. 650 milliGRAM(s) Oral every 6 hours PRN Temp greater or equal to 38.5C (101.3F), Mild Pain (1 - 3)  Dosing Anthropometrics:   Height for BMI (FEET)	5 Feet  Height for BMI (INCHES)	8 Inch(s)  Height for BMI (CM)	172.72 Centimeter(s)  Weight for BMI (lbs)	176.4 lb  Weight for BMI (kg)	80 kg  Body Mass Index	              26.8  ideal body weight:               154 pounds / 70 kg   %ideal body weight             114%     Weight Change: No new wt obtained since admit, strongly recommend nursing to obtain new wt to track/ trend changes     Estimated energy needs:  Weight used for calculations	ideal body weight  Estimated Energy Needs Weight (lbs)	154 lb  Estimated Energy Needs Weight (kg)	69.8 kg  Estimated Energy Needs From (christiana/kg)	25  Estimated Energy Needs To (christiana/kg)	30  Estimated Energy Needs Calculated From (christiana/kg)	1745  Estimated Energy Needs Calculated To (christiana/kg)	2094  Weight used for calculations	ideal body weight  Estimated Protein Needs Weight (lbs)	154 lb  Estimated Protein Needs Weight (kg)	69.8 kg  Estimated Protein Needs From (g/kg)	1.2  Estimated Protein Needs To (g/kg)	1.4  Estimated Protein Needs Calculated From (g/kg)	83.76  Estimated Protein Needs Calculated To (g/kg)	97.72  Estimated Fluid Needs Weight (lbs)	154 lb  Estimated Fluid Needs Weight (kg)	69.8 kg  Estimated Fluid Needs From (ml/kg)	25  Estimated Fluid Needs To (ml/kg)	30  Estimated Fluid Needs Calculated From (ml/kg)	1745  Estimated Fluid Needs Calculated To (ml/kg)	2094  Other Calculations	Pt is >100% ideal body weight stepped down from the ICU, thus ideal body weight used for all calculations. Needs adjusted for age, clinical course.     Subjective: This is a 44 year old male, unknown past medical history (reported stroke and MI by coworkers) presented to [facility] with AMS, Pt was working at [employer] when he was found down by coworkers. EMS called and pt brought to [facility] ED. Intubated, sedated, started on cardene for SBPs in 200s. CT head showed brain stem bleed. Transferred to NSICU for further management.    Patient seen at bedside on 8 [place] for nutrition follow up. Pt afebrile, no drips/pressor support, on tracheostomy collar (SpO2 95-99%). Current diet order: remains NPO with tube feeds meeting 100% estimated nutrient needs. Pt is receiving [employer] Renal 1.8 formula, pt's feeds at goal of 60mL/hour x 18 hours, with Banatrol 2x/day. Soft/nontender abdomen, no noted distention per nursing; BM on 11/30/24. Absence of non-verbal indicators of pain. Skin intact; no pressure ulcers, no noted edema; PEG present. Labs reviewed: elevated BUN (60), electrolytes and other labs are within normal limits at this time; medical team is aware. Medical team stated pt might be stepped down from ICU; RDN will continue to follow. Please see nutrition recommendations below.    Previous Nutrition Diagnosis: Inadequate Oral Intake related to inability to meet nutritional demands via PO as evidenced by NPO with tube feedings    Active [  x ]  Resolved [   ]    Goal: Pt will consume >/= 75% of all meals and supplements via tolerated route     Nutrition Recommendations:  1. NPO with tube feedings, continue with current tube feed regimen to continue to meet 100% estimated needs:  **Boy[employer]ang Renal Support 1.8: at goal of 60mL/hour x 18 hours, providing 1080mL total volume, 1944 calories, 86g protein, ~713mL free water; this meets ~24 calories/kg ideal body weight, ~1.1g protein/kg ideal body weight**  **Provide Banatrol Tube Feeding prn**  **Maintain aspiration precautions at all times; monitor for signs/symptoms of intolerance**  2. Monitor weights (weekly), GI function, skin integrity; electrolytes, BMP, glucose, CBC per MD discretion *renal indices*  3. Pain and bowel regimen per medical team  4. Align nutrition with goals of care at all times  5. Recommend nursing to obtain new weights to track/trend changes    Risk Level: High [  ] Moderate [ x ] Low [   ].

## 2024-12-03 NOTE — PROGRESS NOTE ADULT - SUBJECTIVE AND OBJECTIVE BOX
Patient is a 44y old  Male who presents with a chief complaint of brain stem bleed (03 Dec 2024 00:51)        SUBJECTIVE:  Patient was seen and examined at bedside, non-verbal, no tracking, no WOB on trach collar.    Overnight Events :     Last Bowel Movement: 30-Nov-2024 (12-02)  Last Bowel Movement: 30-Nov-2024 (12-01)  Last Bowel Movement: 30-Nov-2024 (12-01)  Last Bowel Movement: 28-Nov-2024 (11-28)  Last Bowel Movement: 26-Nov-2024 (11-26)      Review of systems: 12 point Review of systems negative unless otherwise documented elsewhere in note.     Diet, NPO with Tube Feed:   Tube Feeding Modality: Gastrostomy  [place] Renal Support 1.8  Total Volume for 24 Hours (mL): 1080  Total Number of Cans: 5  Continuous  Starting Tube Feed Rate mL per Hour: 45  Increase Tube Feed Rate by (mL): 10     Every 4 hours  Until Goal Tube Feed Rate (mL per Hour): 60  Tube Feed Duration (in Hours): 18  Tube Feed Start Time: 10:00  Tube Feed Stop Time: 04:00  Banatrol TF     Qty per Day:  2 (11-20-24 @ 10:26) [Active]      MEDICATIONS:  MEDICATIONS  (STANDING):  acetylcysteine 10%  Inhalation 4 milliLiter(s) Inhalation every 8 hours  albuterol/ipratropium for Nebulization 3 milliLiter(s) Nebulizer every 8 hours  artificial tears (preservative free) Ophthalmic Solution 1 Drop(s) Right EYE every 4 hours  chlorhexidine 2% Cloths 1 Application(s) Topical <User Schedule>  doxazosin 8 milliGRAM(s) Oral at bedtime  heparin   Injectable 5000 Unit(s) SubCutaneous every 8 hours  petrolatum Ophthalmic Ointment 1 Application(s) Both EYES two times a day  senna 2 Tablet(s) Oral at bedtime    MEDICATIONS  (PRN):  acetaminophen   Oral Liquid .. 650 milliGRAM(s) Oral every 6 hours PRN Temp greater or equal to 38.5C (101.3F), Mild Pain (1 - 3)      Allergies    Allergy Status Unknown    Intolerances        OBJECTIVE:  Vital Signs Last 24 Hrs  T(C): 36.8 (03 Dec 2024 09:00), Max: 36.9 (03 Dec 2024 05:00)  T(F): 98.2 (03 Dec 2024 09:00), Max: 98.5 (03 Dec 2024 05:00)  HR: 68 (03 Dec 2024 10:42) (62 - 81)  BP: 133/95 (03 Dec 2024 08:30) (128/87 - 149/98)  BP(mean): 110 (03 Dec 2024 08:30) (102 - 118)  RR: 18 (03 Dec 2024 10:42) (16 - 18)  SpO2: 96% (03 Dec 2024 10:42) (95% - 100%)    Parameters below as of 03 Dec 2024 10:42  Patient On (Oxygen Delivery Method): tracheostomy collar    O2 Concentration (%): 40  I&O's Summary    02 Dec 2024 07:01  -  03 Dec 2024 07:00  --------------------------------------------------------  IN: 2020 mL / OUT: 1370 mL / NET: 650 mL        PHYSICAL EXAM:  General: awake, not tracking, looking comfortable, no WOB on trach collar  HEENT: tracheostomy clean  Lungs: poor inspiration, no crackles, no wheezes  Heart: regular  Abdomen: soft, no visible tenderness, + BS  Extremities: warm, no edema, not following commands      LABS:                        10.9   7.46  )-----------( 204      ( 03 Dec 2024 05:30 )             33.9     12-03    137  |  101  |  60[H]  ----------------------------<  120[H]  3.5   |  23  |  1.22    Ca    9.2      03 Dec 2024 05:30  Phos  3.5     12-03  Mg     2.1     12-03          CAPILLARY BLOOD GLUCOSE        Urinalysis Basic - ( 03 Dec 2024 05:30 )    Color: x / Appearance: x / SG: x / pH: x  Gluc: 120 mg/dL / Ketone: x  / Bili: x / Urobili: x   Blood: x / Protein: x / Nitrite: x   Leuk Esterase: x / RBC: x / WBC x   Sq Epi: x / Non Sq Epi: x / Bacteria: x

## 2024-12-03 NOTE — PROGRESS NOTE ADULT - ASSESSMENT
44M with unclear PMH (?stroke, MI) who was found down at work, intubated for airway protection and found to have acute parenchymal hemorrhage within nabil with mass effect (+ acute/subacute right cerebellar infarct) in setting of hypertensive emergency, transferred to [facility] for further neurosurgical care. Hospital course c/b poor neurologic recovery s/p trach-PEG, AUR s/p gabriel, ANIKA on CKD c/b hyperkalemia, HAP s/p amp-sulbactam (EOT 10/23), K aerogenes bacteremia  treated with 2 weeks of Cefepime per ID , On 11/15 he became septic and was transferred to NSICU due to increased o2 requirements and needed Vent support , Trach Cultures + for Stenotrophomonas which was treated with 7 days of Bactrim per ID , has been weaned of Vent since 11/23 and is now on 10lts at 40% o2 through Trach Collar     # Pontine hemorrhage  # Encephalopathy due to Intracranial Bleed   - Acute parenchymal hemorrhage within nabil with mass effect (+ acute/subacute right cerebellar infarct) in setting of hypertensive emergency.   - MRI brain also demonstrated acute/subacute R cerebellar stroke.   - No Neurosurgical Interventions were performed   - due to IPH and Cerebellar stroke patient has become Trach and Peg Dependent    # Hypertension  - Currently Normotensive , not on Any Anti HTN agents , IF BP Consistently > 140/90 can restart amlodipine 5mg daily     # Acute kidney injury  # Acute Tubular Necrosis superimposed on CKD. 3  - Pt s/p US renal with chronic medical renal disease  - Elevated BUN , Not on any meds that can elevate BUN , Hgb Stable   - 1 Litre LR bolus on 12/1, Cr improved to 1.22 today    # Chronic respiratory failure.   -Pt s/p percutaneous trach by pulm on 10/14/24  - Continue trach collar   - Continue routine trach care and suctioning PRN  - Chest PT      # Neurogenic dysphagia.   -Pt s/pPEG with surgery 10/21/24  - TF per nutrition  - aspiration precautions and elevation of HOB.    #Acute urinary retention.   - doxazosin 8mg  - Failure TOV on 11/25/24     # Functional quadriplegia in  setting of brainstem hemorrhage  - decub precautions  - care per nursing protocol     # DVT prop  -Continue Heparin SQ q8     # Dispo : Patient is from [place] , Jason Aguayo [phone]    - awaiting PRUCOL to Apply for Medicaid and  Transfer to [facility]

## 2024-12-03 NOTE — PROGRESS NOTE ADULT - SUBJECTIVE AND OBJECTIVE BOX
HPI:  Unknown age male, unknown past medical history (reported stroke and MI by coworkers) presented to [facility] with AMS, Pt was working at [employer] when he was found down by coworkers. EMS called and pt brought to [facility] ED. Intubated, sedated, started on cardene for SBPs in 200s. CT head showed brain stem bleed. Transferred to NSICU for further management.  (30 Sep 2024 12:55)    INTERVAL EVENTS:  GEORGE o/n    HOSPITAL COURSE:  11/1: BD 32. GEORGE overnight. Given 1L NS for dehydration. Na 146, increased FW to 250q6.   11/2: BD 33 GEORGE overnight, neuro stable, given 500cc bolus for net negative status and tachycardia   11/3: BD 34, GEORGE overnight, neuro stable. Patient remains tachycardic, EKG showing sinus tachycardia, given additional 500cc NS bolus. Febrile to 101.9F, pan cultured (without UA), CXR WNL, given tylenol.   11/4: BD 35, GEORGE overnight, neuro stable. Given 1L NS for tachycardia. sputum (+) for stenotropohomas maltophilia.   11/5: BD 36 GEORGE overnight, neuro stable. Vancomycin dc'd. Chest PT BID. ID consulted, cont zosyn.  11/6: BD 37. blood cx + klebsiella dc zosyn changed to cefepime, CTAP ordered, rpt blood cx sent.    11/7: BD 38. Pending CT A/P, given 250cc bolus and starting maintenance fluids overnight. Pending CT A/P after bolus   11/8: BD 39. CT CAP negative for infection.   11/9: BD 40. GEORGE overnight.  11/10: BD 41. GEORGE overnight. desat to 85 on trach collar, O2 inc to 10L and 100%, O2 sat inc to 95. pt tachy to 110s, euvolemic. given tylenol. ABG and CXR ordered. spiked fever, pancultured, RVP negative. AM ABG w pO2 79, rpt w pO2 79. pt appears comfortable, satting 94%.   11/11: BD 42. EGORGE overnight. pt became tachy to 130s, desat to 90 on 100% FiO2 and 10L. suctioned, (+) productive cough. temp 101.4, given 1g IV tylenol and 500cc NS bolus for euvolemia. fever and HR downtrending. LE dopplers negative for dvt  11/12: BD 43, GEORGE ovn, fever and HR downtrending, satting 97% 70% FIO2  11/13: BD 44, GEORGE ovn. started standing tylenol x24 hours for tachycardia. desat to 80s, o2 increased. CXR stable, pending CTA PE protocol.   11/14: BD 45, GEORGE overnight, neuro stable. resp therapy dec FiO2 to 70%.   11/15: BD 46, GEORGE overnight, neuro stable.  Rapid called for desaturation 30s, tachycardic 140s. Patient bagged, 100% fio2, heavily suctioned. CXR/POCUS unremarkable. ABG c/w desaturation. WBC 14.71. Afebrile. O2 improved to 90s and patient upgraded to ICU. ABG paO2 30s improved to 89 on vent. IV Tylenol x 1, sputum sent. Start protonix while o-n vent.   11/16: BD 47. POCUS showed collapsable IVCF, given 1L bolus. Vanco/Cefpime added empriic for PNA, NGT feeds restarted. MRSA swab neg, Vanc DC'd.   11/17: BD 48. GEORGE overnight. 1l bolus for tachycardia. Spiked to 101, cultured. 500cc bolus for tachycardia, tachy to 148 given 25mcg fentanyl, 250cc albumin, 1.5L bolus. 5 IV lopressor with response HR to 100s. +Stenotrophomonas on sputum cx.   11/18: BD 49. GEORGE overnight. Consulted ID, cefepime switched to bactrim x7days. Started hydrochlorothiazine 12.5mg daily.  11/19: BD 50. Tachy 120s, given tylenol and 500cc NS. Tolerating 5/5, switched to TCx. Holding phos binder. D/c Bactrim. D/c nery, f/u TOV. Dc'd PPI.   11/20: BD 51. GEORGE ovn. 1600 satting low 90s, mildly tachy to 110s, afebrile, RR wnl. O2 improved to mid 90s while inc O2 to 100% on TCx. CPAP 5/5 placed back on.  11/21: BD 52, GEORGE ovn, tolerating CPAP 5/5. Switch to trach collar during the day if tolerating well. HCTZ held for Cr bump, straight cath frequence increased to q4  11/22: BD 53, GEORGE ovn. Resumed phoslo. Vuong placed. Resumed HCTZ.   11/23: BD 54. Holding tylenol in setting of possible fever, will require pan cx if febrile. Cr improved today. Cont CPAP. Bowel regimen held i/s/o diarrhea. FOBT negative.  11/24: BD 55. GEORGE overnight. Neuro stable. HCTZ dc'ed, started lisinopril 5. Lokelma dc'ed for K 3.7.   11/25: BD 56, GEORGE overnight. Neuro stable. dc'd lisinopril 5mg. Vuong dc'd. TOV. 1545 noted to be hypotensive, MAP 50, in supine position on chair, HR 60s, afebrile, O2 96%. Given 1L cc NS bolus, placed back on bed in reverse trendelenberg, improved to map of 66. Neostick at bedside. Vitals check q1h. Dc'ed amlodipine. Failed TOV, bladder scan q6, sc prn. Added back Senna.   11/26: BD 57, GEORGE overnight. Neuro stable. Dc'd phoslo.   11/27: BD 58, GEORGE overnight. Neuro stable.    11/28: BD 59. Vuong replaced.   11/29: GEORGE.  11/30: GEORGE, neuro stable.   12/1: BD 62, GEORGE overnight  12/2: BD 63, GEORGE overnight.; Given 1L bolus of LR for uptrending BUN/Cr.  12/3: BD 64.         Vital Signs Last 24 Hrs  T(C): 36.7 (02 Dec 2024 22:21), Max: 36.8 (02 Dec 2024 18:01)  T(F): 98 (02 Dec 2024 22:21), Max: 98.2 (02 Dec 2024 18:01)  HR: 66 (02 Dec 2024 21:25) (62 - 94)  BP: 149/98 (02 Dec 2024 20:20) (131/88 - 149/98)  BP(mean): 118 (02 Dec 2024 20:20) (104 - 118)  RR: 16 (02 Dec 2024 21:25) (16 - 18)  SpO2: 99% (02 Dec 2024 21:25) (93% - 100%)    Parameters below as of 02 Dec 2024 21:25  Patient On (Oxygen Delivery Method): tracheostomy collar    O2 Concentration (%): 40    I&O's Summary    01 Dec 2024 07:01  -  02 Dec 2024 07:00  --------------------------------------------------------  IN: 1330 mL / OUT: 1300 mL / NET: 30 mL    02 Dec 2024 07:01  -  03 Dec 2024 00:51  --------------------------------------------------------  IN: 1230 mL / OUT: 570 mL / NET: 660 mL        PHYSICAL EXAM:  General: patient seen laying supine in bed in NAD  Neuro: OEs to voice, A&Ox0, L hand 2/5 and L triceps 3/5 to command, RLE withdraws to noxious, LLE trace withdrawal, LUE 0/5  HEENT: PERRL, only tracks vertically, +trach collar  Neck: supple  Cardiac: RRR, S1S2  Pulmonary: chest rise symmetric  Abdomen: soft, nontender, nondistended, +PEG  Ext: perfusing well  Skin: warm, dry      LABS:                        11.5   10.11 )-----------( 214      ( 02 Dec 2024 05:30 )             35.4     12-02    136  |  102  |  64[H]  ----------------------------<  121[H]  4.0   |  23  |  1.32[H]    Ca    9.3      02 Dec 2024 05:30  Phos  3.6     12-02  Mg     2.3     12-02        Urinalysis Basic - ( 02 Dec 2024 05:30 )    Color: x / Appearance: x / SG: x / pH: x  Gluc: 121 mg/dL / Ketone: x  / Bili: x / Urobili: x   Blood: x / Protein: x / Nitrite: x   Leuk Esterase: x / RBC: x / WBC x   Sq Epi: x / Non Sq Epi: x / Bacteria: x          CAPILLARY BLOOD GLUCOSE          Drug Levels: [] N/A    CSF Analysis: [] N/A      Allergies    Allergy Status Unknown    Intolerances      MEDICATIONS:  Antibiotics:    Neuro:  acetaminophen   Oral Liquid .. 650 milliGRAM(s) Oral every 6 hours PRN    Anticoagulation:  heparin   Injectable 5000 Unit(s) SubCutaneous every 8 hours    OTHER:  acetylcysteine 10%  Inhalation 4 milliLiter(s) Inhalation every 8 hours  albuterol/ipratropium for Nebulization 3 milliLiter(s) Nebulizer every 8 hours  artificial tears (preservative free) Ophthalmic Solution 1 Drop(s) Right EYE every 4 hours  chlorhexidine 2% Cloths 1 Application(s) Topical <User Schedule>  doxazosin 8 milliGRAM(s) Oral at bedtime  petrolatum Ophthalmic Ointment 1 Application(s) Both EYES two times a day  senna 2 Tablet(s) Oral at bedtime    IVF:    CULTURES:  Culture Results:   Mixed gram negative rods including Moderate Stenotrophomonas maltophilia  Commensal iram consistent with body site (11-25 @ 00:19)    RADIOLOGY & ADDITIONAL TESTS:      ASSESSMENT:  43 y/o PMH ?stroke/MI present to [facility] after collapsing at work. Decorticate posturing, vomiting, intubated for airway protection. 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 Transferred to [facility] for further management. s/p trach 10/14. s/p peg 10/21. Re-upgrade to ICU 2/2 desaturation event and suctioning requirements 11/15.     PLAN:  Neuro:  - neuro/vitals q4h, protected sleep time 10PM-5AM  - pain control: tylenol prn  - vEEG (10/3-4)- negative, (10/17-10/19) - negative  - CTH 9/30: enlarged pontine hemorrhage, CTH 10/3: stable, CTH 10/25: mostly resolved pontine hemorrhage  - MRI brain 10/12: parenchymal hemorrhage, acute/subacute R cerebellar stroke    - stroke core measures, stroke neuro signed off    CV:  - -160  - HTN: hold amlodipine 10mg, hold lisinopril 5mg   - echo (9/30) EF 75%    Resp:  - trach collar   - Secretions: duonebs/mucomyst/chest PT q8h   - CTA chest 11/13 negative for PE, inc ground glass opacities    GI:  - TF via PEG (placed 10/21 by gen surg)  - bowel regimen, last BM 12/1  - CTAP 11/8 negative for infection    - FOBT negative 11/23    Renal:  - IVL  - FW flushes 250cc q6hrs for uptrending BUN/Cr  - Urinary retenion: Vuong replaced 11/28  - CKD: trend BUN/Cr  - renal US 10/1: echogenicity c/w chronic med renal dz, repeat 10/8: inc renal echogeicity, c/f medical renal disease w increased hydro     Endo:  - A1c 5.4    Heme:  - DVT ppx: SCDs, SQH 5000u q8h   - LE dopplers negative 11/11    ID:  - febrile, last pancx 11/16, MRSA swab neg 11/16   -  s/p Stenotrophomonas maltophilia PNA: s/p Bactrim (11/18-11/19) s/p Cefepime (11/16-11/18)  - 11/3, (+) sputum for stenotrophomas maltophlia, blood cx (+) klebsiella, cefepime 2gq12 (11/6 - 11/12)   - empiric tx: s/p zosyn (11/3-11/6) , s/p vanc  (11/3- 11/5)  - S/p Ancef (10/4-10/14) for PNA, and s/p Unasyn (10/18-10/23) +actinobacter baumanii    MISC:  - ophtho consult for keratitis, s/p erythromycin ointment to rt eye q4hrs, ofloxacin ointment to rt eye QID, artificial tears to rt eye q2hrs, moisture chamber at bedtime    Dispo: tele status, full code, pending placement and guardianship hearing     D/w Dr. D'Amico  HPI:  Unknown age male, unknown past medical history (reported stroke and MI by coworkers) presented to [facility] with AMS, Pt was working at [employer] when he was found down by coworkers. EMS called and pt brought to [facility] ED. Intubated, sedated, started on cardene for SBPs in 200s. CT head showed brain stem bleed. Transferred to NSICU for further management.  (30 Sep 2024 12:55)    INTERVAL EVENTS:  GEORGE o/n    HOSPITAL COURSE:  11/1: BD 32. GEORGE overnight. Given 1L NS for dehydration. Na 146, increased FW to 250q6.   11/2: BD 33 GEORGE overnight, neuro stable, given 500cc bolus for net negative status and tachycardia   11/3: BD 34, GEORGE overnight, neuro stable. Patient remains tachycardic, EKG showing sinus tachycardia, given additional 500cc NS bolus. Febrile to 101.9F, pan cultured (without UA), CXR WNL, given tylenol.   11/4: BD 35, GEORGE overnight, neuro stable. Given 1L NS for tachycardia. sputum (+) for stenotropohomas maltophilia.   11/5: BD 36 GEORGE overnight, neuro stable. Vancomycin dc'd. Chest PT BID. ID consulted, cont zosyn.  11/6: BD 37. blood cx + klebsiella dc zosyn changed to cefepime, CTAP ordered, rpt blood cx sent.    11/7: BD 38. Pending CT A/P, given 250cc bolus and starting maintenance fluids overnight. Pending CT A/P after bolus   11/8: BD 39. CT CAP negative for infection.   11/9: BD 40. GEORGE overnight.  11/10: BD 41. GEORGE overnight. desat to 85 on trach collar, O2 inc to 10L and 100%, O2 sat inc to 95. pt tachy to 110s, euvolemic. given tylenol. ABG and CXR ordered. spiked fever, pancultured, RVP negative. AM ABG w pO2 79, rpt w pO2 79. pt appears comfortable, satting 94%.   11/11: BD 42. GEORGE overnight. pt became tachy to 130s, desat to 90 on 100% FiO2 and 10L. suctioned, (+) productive cough. temp 101.4, given 1g IV tylenol and 500cc NS bolus for euvolemia. fever and HR downtrending. LE dopplers negative for dvt  11/12: BD 43, GEORGE ovn, fever and HR downtrending, satting 97% 70% FIO2  11/13: BD 44, GEORGE ovn. started standing tylenol x24 hours for tachycardia. desat to 80s, o2 increased. CXR stable, pending CTA PE protocol.   11/14: BD 45, GEORGE overnight, neuro stable. resp therapy dec FiO2 to 70%.   11/15: BD 46, GEORGE overnight, neuro stable.  Rapid called for desaturation 30s, tachycardic 140s. Patient bagged, 100% fio2, heavily suctioned. CXR/POCUS unremarkable. ABG c/w desaturation. WBC 14.71. Afebrile. O2 improved to 90s and patient upgraded to ICU. ABG paO2 30s improved to 89 on vent. IV Tylenol x 1, sputum sent. Start protonix while o-n vent.   11/16: BD 47. POCUS showed collapsable IVCF, given 1L bolus. Vanco/Cefpime added empriic for PNA, NGT feeds restarted. MRSA swab neg, Vanc DC'd.   11/17: BD 48. GEORGE overnight. 1l bolus for tachycardia. Spiked to 101, cultured. 500cc bolus for tachycardia, tachy to 148 given 25mcg fentanyl, 250cc albumin, 1.5L bolus. 5 IV lopressor with response HR to 100s. +Stenotrophomonas on sputum cx.   11/18: BD 49. GEORGE overnight. Consulted ID, cefepime switched to bactrim x7days. Started hydrochlorothiazine 12.5mg daily.  11/19: BD 50. Tachy 120s, given tylenol and 500cc NS. Tolerating 5/5, switched to TCx. Holding phos binder. D/c Bactrim. D/c nery, f/u TOV. Dc'd PPI.   11/20: BD 51. GEORGE ovn. 1600 satting low 90s, mildly tachy to 110s, afebrile, RR wnl. O2 improved to mid 90s while inc O2 to 100% on TCx. CPAP 5/5 placed back on.  11/21: BD 52, GEORGE ovn, tolerating CPAP 5/5. Switch to trach collar during the day if tolerating well. HCTZ held for Cr bump, straight cath frequence increased to q4  11/22: BD 53, GEORGE ovn. Resumed phoslo. Vuong placed. Resumed HCTZ.   11/23: BD 54. Holding tylenol in setting of possible fever, will require pan cx if febrile. Cr improved today. Cont CPAP. Bowel regimen held i/s/o diarrhea. FOBT negative.  11/24: BD 55. GEORGE overnight. Neuro stable. HCTZ dc'ed, started lisinopril 5. Lokelma dc'ed for K 3.7.   11/25: BD 56, GEORGE overnight. Neuro stable. dc'd lisinopril 5mg. Vuong dc'd. TOV. 1545 noted to be hypotensive, MAP 50, in supine position on chair, HR 60s, afebrile, O2 96%. Given 1L cc NS bolus, placed back on bed in reverse trendelenberg, improved to map of 66. Neostick at bedside. Vitals check q1h. Dc'ed amlodipine. Failed TOV, bladder scan q6, sc prn. Added back Senna.   11/26: BD 57, GEORGE overnight. Neuro stable. Dc'd phoslo.   11/27: BD 58, GEORGE overnight. Neuro stable.    11/28: BD 59. Vuong replaced.   11/29: GEORGE.  11/30: GEORGE, neuro stable.   12/1: BD 62, GEORGE overnight  12/2: BD 63, GEORGE overnight.; Given 1L bolus of LR for uptrending BUN/Cr.  12/3: BD 64.         Vital Signs Last 24 Hrs  T(C): 36.7 (02 Dec 2024 22:21), Max: 36.8 (02 Dec 2024 18:01)  T(F): 98 (02 Dec 2024 22:21), Max: 98.2 (02 Dec 2024 18:01)  HR: 66 (02 Dec 2024 21:25) (62 - 94)  BP: 149/98 (02 Dec 2024 20:20) (131/88 - 149/98)  BP(mean): 118 (02 Dec 2024 20:20) (104 - 118)  RR: 16 (02 Dec 2024 21:25) (16 - 18)  SpO2: 99% (02 Dec 2024 21:25) (93% - 100%)    Parameters below as of 02 Dec 2024 21:25  Patient On (Oxygen Delivery Method): tracheostomy collar    O2 Concentration (%): 40    I&O's Summary    01 Dec 2024 07:01  -  02 Dec 2024 07:00  --------------------------------------------------------  IN: 1330 mL / OUT: 1300 mL / NET: 30 mL    02 Dec 2024 07:01  -  03 Dec 2024 00:51  --------------------------------------------------------  IN: 1230 mL / OUT: 570 mL / NET: 660 mL        PHYSICAL EXAM:  General: patient seen laying supine in bed in NAD  Neuro: OEs to voice, A&Ox0, R hand 2/5 and R triceps 3/5 to command, RLE withdraws to noxious, LLE trace withdrawal, LUE 0/5  HEENT: PERRL, only tracks vertically, +trach collar  Neck: supple  Cardiac: RRR, S1S2  Pulmonary: chest rise symmetric  Abdomen: soft, nontender, nondistended, +PEG  Ext: perfusing well  Skin: warm, dry      LABS:                        11.5   10.11 )-----------( 214      ( 02 Dec 2024 05:30 )             35.4     12-02    136  |  102  |  64[H]  ----------------------------<  121[H]  4.0   |  23  |  1.32[H]    Ca    9.3      02 Dec 2024 05:30  Phos  3.6     12-02  Mg     2.3     12-02        Urinalysis Basic - ( 02 Dec 2024 05:30 )    Color: x / Appearance: x / SG: x / pH: x  Gluc: 121 mg/dL / Ketone: x  / Bili: x / Urobili: x   Blood: x / Protein: x / Nitrite: x   Leuk Esterase: x / RBC: x / WBC x   Sq Epi: x / Non Sq Epi: x / Bacteria: x          CAPILLARY BLOOD GLUCOSE          Drug Levels: [] N/A    CSF Analysis: [] N/A      Allergies    Allergy Status Unknown    Intolerances      MEDICATIONS:  Antibiotics:    Neuro:  acetaminophen   Oral Liquid .. 650 milliGRAM(s) Oral every 6 hours PRN    Anticoagulation:  heparin   Injectable 5000 Unit(s) SubCutaneous every 8 hours    OTHER:  acetylcysteine 10%  Inhalation 4 milliLiter(s) Inhalation every 8 hours  albuterol/ipratropium for Nebulization 3 milliLiter(s) Nebulizer every 8 hours  artificial tears (preservative free) Ophthalmic Solution 1 Drop(s) Right EYE every 4 hours  chlorhexidine 2% Cloths 1 Application(s) Topical <User Schedule>  doxazosin 8 milliGRAM(s) Oral at bedtime  petrolatum Ophthalmic Ointment 1 Application(s) Both EYES two times a day  senna 2 Tablet(s) Oral at bedtime    IVF:    CULTURES:  Culture Results:   Mixed gram negative rods including Moderate Stenotrophomonas maltophilia  Commensal iram consistent with body site (11-25 @ 00:19)    RADIOLOGY & ADDITIONAL TESTS:      ASSESSMENT:  43 y/o PMH ?stroke/MI present to [facility] after collapsing at work. Decorticate posturing, vomiting, intubated for airway protection. 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 Transferred to [facility] for further management. s/p trach 10/14. s/p peg 10/21. Re-upgrade to ICU 2/2 desaturation event and suctioning requirements 11/15.     PLAN:  Neuro:  - neuro/vitals q4h, protected sleep time 10PM-5AM  - pain control: tylenol prn  - vEEG (10/3-4)- negative, (10/17-10/19) - negative  - CTH 9/30: enlarged pontine hemorrhage, CTH 10/3: stable, CTH 10/25: mostly resolved pontine hemorrhage  - MRI brain 10/12: parenchymal hemorrhage, acute/subacute R cerebellar stroke    - stroke core measures, stroke neuro signed off    CV:  - -160  - HTN: hold amlodipine 10mg, hold lisinopril 5mg   - echo (9/30) EF 75%    Resp:  - trach collar   - Secretions: duonebs/mucomyst/chest PT q8h   - CTA chest 11/13 negative for PE, inc ground glass opacities    GI:  - TF via PEG (placed 10/21 by gen surg)  - bowel regimen, last BM 12/1  - CTAP 11/8 negative for infection    - FOBT negative 11/23    Renal:  - IVL  - FW flushes 250cc q6hrs for uptrending BUN/Cr  - Urinary retenion: Vuong replaced 11/28  - CKD: trend BUN/Cr  - renal US 10/1: echogenicity c/w chronic med renal dz, repeat 10/8: inc renal echogeicity, c/f medical renal disease w increased hydro     Endo:  - A1c 5.4    Heme:  - DVT ppx: SCDs, SQH 5000u q8h   - LE dopplers negative 11/11    ID:  - febrile, last pancx 11/16, MRSA swab neg 11/16   -  s/p Stenotrophomonas maltophilia PNA: s/p Bactrim (11/18-11/19) s/p Cefepime (11/16-11/18)  - 11/3, (+) sputum for stenotrophomas maltophlia, blood cx (+) klebsiella, cefepime 2gq12 (11/6 - 11/12)   - empiric tx: s/p zosyn (11/3-11/6) , s/p vanc  (11/3- 11/5)  - S/p Ancef (10/4-10/14) for PNA, and s/p Unasyn (10/18-10/23) +actinobacter baumanii    MISC:  - ophtho consult for keratitis, s/p erythromycin ointment to rt eye q4hrs, ofloxacin ointment to rt eye QID, artificial tears to rt eye q2hrs, moisture chamber at bedtime    Dispo: tele status, full code, pending placement and guardianship hearing     D/w Dr. D'Amico

## 2024-12-04 PROCEDURE — 99232 SBSQ HOSP IP/OBS MODERATE 35: CPT

## 2024-12-04 PROCEDURE — 99233 SBSQ HOSP IP/OBS HIGH 50: CPT | Mod: GC

## 2024-12-04 RX ADMIN — OFLOXACIN 1 DROP(S): 3 SOLUTION OPHTHALMIC at 05:24

## 2024-12-04 RX ADMIN — OFLOXACIN 1 DROP(S): 3 SOLUTION OPHTHALMIC at 01:01

## 2024-12-04 RX ADMIN — DOXAZOSIN MESYLATE 8 MILLIGRAM(S): 8 TABLET ORAL at 21:22

## 2024-12-04 RX ADMIN — ERYTHROMYCIN 1 APPLICATION(S): 5 OINTMENT OPHTHALMIC at 21:24

## 2024-12-04 RX ADMIN — Medication 1 DROP(S): at 01:01

## 2024-12-04 RX ADMIN — HEPARIN SODIUM 5000 UNIT(S): 1000 INJECTION INTRAVENOUS; SUBCUTANEOUS at 21:24

## 2024-12-04 RX ADMIN — OFLOXACIN 1 DROP(S): 3 SOLUTION OPHTHALMIC at 11:32

## 2024-12-04 RX ADMIN — Medication 1 DROP(S): at 05:23

## 2024-12-04 RX ADMIN — HEPARIN SODIUM 5000 UNIT(S): 1000 INJECTION INTRAVENOUS; SUBCUTANEOUS at 05:22

## 2024-12-04 RX ADMIN — HEPARIN SODIUM 5000 UNIT(S): 1000 INJECTION INTRAVENOUS; SUBCUTANEOUS at 13:46

## 2024-12-04 RX ADMIN — Medication 1 APPLICATION(S): at 05:22

## 2024-12-04 RX ADMIN — Medication 1 DROP(S): at 21:22

## 2024-12-04 RX ADMIN — Medication 1 APPLICATION(S): at 18:29

## 2024-12-04 RX ADMIN — ACETYLCYSTEINE 4 MILLILITER(S): 200 INHALANT RESPIRATORY (INHALATION) at 13:47

## 2024-12-04 RX ADMIN — IPRATROPIUM BROMIDE AND ALBUTEROL SULFATE 3 MILLILITER(S): .5; 2.5 SOLUTION RESPIRATORY (INHALATION) at 13:47

## 2024-12-04 RX ADMIN — Medication 2 TABLET(S): at 21:22

## 2024-12-04 RX ADMIN — IPRATROPIUM BROMIDE AND ALBUTEROL SULFATE 3 MILLILITER(S): .5; 2.5 SOLUTION RESPIRATORY (INHALATION) at 05:22

## 2024-12-04 RX ADMIN — Medication 1 APPLICATION(S): at 05:23

## 2024-12-04 RX ADMIN — Medication 1 DROP(S): at 13:46

## 2024-12-04 RX ADMIN — IPRATROPIUM BROMIDE AND ALBUTEROL SULFATE 3 MILLILITER(S): .5; 2.5 SOLUTION RESPIRATORY (INHALATION) at 21:24

## 2024-12-04 RX ADMIN — Medication 1 DROP(S): at 18:28

## 2024-12-04 RX ADMIN — ACETYLCYSTEINE 4 MILLILITER(S): 200 INHALANT RESPIRATORY (INHALATION) at 21:24

## 2024-12-04 RX ADMIN — Medication 1 DROP(S): at 10:31

## 2024-12-04 RX ADMIN — ACETYLCYSTEINE 4 MILLILITER(S): 200 INHALANT RESPIRATORY (INHALATION) at 05:23

## 2024-12-04 RX ADMIN — OFLOXACIN 1 DROP(S): 3 SOLUTION OPHTHALMIC at 18:29

## 2024-12-04 NOTE — PROGRESS NOTE ADULT - ASSESSMENT
44M with unclear PMH (?stroke, MI) who was found down at work, intubated for airway protection and found to have acute parenchymal hemorrhage within nabil with mass effect (+ acute/subacute right cerebellar infarct) in setting of hypertensive emergency, transferred to [facility] for further neurosurgical care. Hospital course c/b poor neurologic recovery s/p trach-PEG, AUR s/p gabriel, ANIKA on CKD c/b hyperkalemia, HAP s/p amp-sulbactam (EOT 10/23), K aerogenes bacteremia  treated with 2 weeks of Cefepime per ID , On 11/15 he became septic and was transferred to NSICU due to increased o2 requirements and needed Vent support , Trach Cultures + for Stenotrophomonas which was treated with 7 days of Bactrim per ID , has been weaned of Vent since 11/23 and is now on 10lts at 40% o2 through Trach Collar     # Pontine hemorrhage  # Encephalopathy due to Intracranial Bleed   - Acute parenchymal hemorrhage within nabil with mass effect (+ acute/subacute right cerebellar infarct) in setting of hypertensive emergency.   - MRI brain also demonstrated acute/subacute R cerebellar stroke.   - No Neurosurgical Interventions were performed   - due to IPH and Cerebellar stroke patient has become Trach and Peg Dependent    # Hypertension  - Currently Normotensive , not on Any Anti HTN agents , IF BP Consistently > 140/90 can restart amlodipine 5mg daily     # Acute kidney injury  # Acute Tubular Necrosis superimposed on CKD. 3  - Pt s/p US renal with chronic medical renal disease  - Elevated BUN , Not on any meds that can elevate BUN , Hgb Stable   - 1 Litre LR bolus on 12/1, Cr improved to 1.22 12/3    # Chronic respiratory failure.   -Pt s/p percutaneous trach by pulm on 10/14/24  - Continue trach collar   - Continue routine trach care and suctioning PRN  - Chest PT      # Neurogenic dysphagia.   -Pt s/pPEG with surgery 10/21/24  - TF per nutrition  - aspiration precautions and elevation of HOB.    #Acute urinary retention.   - doxazosin 8mg  - Failure TOV on 11/25/24     # Functional quadriplegia in  setting of brainstem hemorrhage  - decub precautions  - care per nursing protocol     # DVT prop  -Continue Heparin SQ q8     # Dispo : Patient is from [place] , Jason Aguayo [phone]    - awaiting PRUCOL to Apply for Medicaid and  Transfer to [facility]

## 2024-12-04 NOTE — PROGRESS NOTE ADULT - ASSESSMENT
44 year old male with functional, ADL, cognitive, and speech impairments in the setting of brainstem hemorrhage, s/p trach and PEG.     Brainstem hemorrhage   Respiratory dysfunction s/p Trach  Dysphagia s/p PEG  HTN - amlodipine, HCTZ  Urinary retention - +gabriel    PLAN / RECOMMENDATIONS:     # Rehab / Mobilization:   - Initial therapy assessment: [X] PT  [X] OT   - Continue skilled therapy while admitted to prevent secondary complications of immobility focus on transfer training, bed mobility, progressive ambulation, and equipment evaluation.   - Educated patient and family members on post-acute rehabilitation. Discussed anticipated rehab course.  - Encouraged HOB elevation to at least 30-40 deg to prevent orthostasis   - nursing to reposition q2 turning to prevent skin breakdown given limited mobility   - Keep extremities elevated on pillows to assist with edema and positioning.   - Ensure proper positioning of neck to midline to avoid torticollis   - Therapy precautions: falls, orthostasis, hypoxia    # Bracing/Splinting:   - Z-Flow multi-podus boots for positioning/contracture prevention  - Requested left resting hand splint. [facility]     # Pain Management:   - Avoid/ monitor use sedating medications that may interfere with cognitive recovery   - Current pain regimen reviewed    # Speech/ Swallow:   - Dysphagia screen [X]  - SLP consult for swallow function evaluation and treatment, evaluate for PMV  - Diet:  NPO, continuous feeds through PEG    # GI/ Bowel:  - Continue to monitor bowel patterns.   - Bowel Regimen: miralax    # / Bladder: + gabriel  - Continue to monitor bladder patterns, avoid constipation.       # DVT Prophylaxis:   - SCDs, chemoprophylaxis - heparin    # Disposition optimization:     - Disposition recommendation - Inpatient Rehabilitation (AR vs IRON)  - Showing some neurologic improvement and command following. If continues to make functional gains, consider acute inpatient rehab placement  - Care coordination working on emergency medicaid

## 2024-12-04 NOTE — PROGRESS NOTE ADULT - SUBJECTIVE AND OBJECTIVE BOX
INTERVAL COURSE / SUBJECTIVE:  Trach and PEG dependent. Uptrending BUN/Cr s/p IVF bolus. Afebrile.  Working with PT/OT without issues.   Pending guardianship hearing on 12/23. Waiting for Medicaid application then IRON.    -----------------------------------------------------------------------  REVIEW OF SYSTEMS:  Constitutional - No fever  Neurological -   Pain -   Bowel - 12/1  Bladder - gabriel  -----------------------------------------------------------------------  FUNCTIONAL PROGRESS:    Physical Therapy: 12/2    Cognitive/Neuro/Behavioral  Level of Consciousness: alert  Arousal Level: arouses to voice  Orientation: situation;  disoriented to  Speech: unable to speak  Mood/Behavior: calm    Language Assistance  Preferred Language to Address Healthcare Preferred Language to Address Healthcare: Danish    Therapeutic Interventions      Bed-Chair Transfer Training  Transfer Training Bed-to-Chair Transfer: dependent (less than 25% patient effort);  2 person assist;  verbal cues  Transfer Training Chair-to-Bed Transfer: N/T as pt left seated in chair   Bed-to-Chair Transfer Training Transfer Safety Analysis: decreased balance;  decreased step length;  decreased weight-shifting ability;  decreased strength;  impaired balance;  impaired postural control    Neuromuscular Re-education  Neuromuscular Re-education Detail: Pt demo ability to track in all directions except to R, with L eye improved ability compared to R eye // Pt demo active cervical spine rotation in limited ROM and gravity eliminated position // Pt with LUE spontaneous and non-purposeful muscle contractions, pt with ability to actively squeeze R hand on command // Pt able to perform active R ankle PF/DF on command // Pt able to feel noxious stimuli on RUE and RLE // Pt with trace bilateral shoulder shrug       Occupational Therapy: 12/2    Bed-Chair Transfer Training  Transfer Training Bed-to-Chair Transfer: dependent (less than 25% patient effort);  lateral transfer via air tap  Transfer Training Chair-to-Bed Transfer: pt left in recliner chair   Bed-to-Chair Transfer Training Transfer Safety Analysis: decreased balance;  decreased strength;  impaired coordination;  impaired motor control    Therapeutic Exercise  Therapeutic Exercise Detail: Pt tolerating seated in recliner chair for 25 mins throughout session     Neuromuscular Re-education  Neuromuscular Re-education Detail: Pt demonstrates cervical rotation to L > R on command. Pt opens/ closes eyes to command with 100% accuracy. Pt raises eyebrows on command with 100% accuracy. Pt attempts smile on R; unable to stick out tongue. Pt with full vertical gaze, able to track vertical stim on command. Pt able to track with L eye to L visual field; unable to scan to R. Pt demonstrates gross grasp on command to R + R ankle dorsi/plantar flexion on command. Spontaneous LLE movement present throughout. Pt reports sensation on R; not able to ID sensation on L.     Lower Body Dressing Training  Lower Body Dressing Training Assistance: dependent (less than 25% patient effort)    OT Cognitive Treatment  OT Treatment: Cognitive Charges: Patient scored a (4/30) on the O-Log; max score for this pt given communication strategies would be /10. A score of >25 is associated with normal orientation. Pt requires cues to orient to month date situation. Pt demos 100% command follow to commands within available physical presentation           Speech Therapy:    -----------------------------------------------------------------------  VITALS  T(C): 36.6 (12-04-24 @ 04:42), Max: 36.8 (12-03-24 @ 09:00)  HR: 92 (12-04-24 @ 03:45) (66 - 92)  BP: 122/89 (12-04-24 @ 03:45) (117/88 - 148/102)  RR: 18 (12-04-24 @ 03:45) (16 - 18)  SpO2: 92% (12-04-24 @ 03:45) (92% - 100%)  Wt(kg): --      -----------------------------------------------------------------------  RECENT LABS  CBC Full  -  ( 03 Dec 2024 05:30 )  WBC Count : 7.46 K/uL  RBC Count : 3.64 M/uL  Hemoglobin : 10.9 g/dL  Hematocrit : 33.9 %  Platelet Count - Automated : 204 K/uL  Mean Cell Volume : 93.1 fl  Mean Cell Hemoglobin : 29.9 pg  Mean Cell Hemoglobin Concentration : 32.2 g/dL  Auto Neutrophil # : x  Auto Lymphocyte # : x  Auto Monocyte # : x  Auto Eosinophil # : x  Auto Basophil # : x  Auto Neutrophil % : x  Auto Lymphocyte % : x  Auto Monocyte % : x  Auto Eosinophil % : x  Auto Basophil % : x    12-03    137  |  101  |  60[H]  ----------------------------<  120[H]  3.5   |  23  |  1.22    Ca    9.2      03 Dec 2024 05:30  Phos  3.5     12-03  Mg     2.1     12-03      Urinalysis Basic - ( 03 Dec 2024 05:30 )    Color: x / Appearance: x / SG: x / pH: x  Gluc: 120 mg/dL / Ketone: x  / Bili: x / Urobili: x   Blood: x / Protein: x / Nitrite: x   Leuk Esterase: x / RBC: x / WBC x   Sq Epi: x / Non Sq Epi: x / Bacteria: x        -----------------------------------------------------------------------  IMAGING      -----------------------------------------------------------------------  MEDICATIONS   acetaminophen   Oral Liquid .. 650 milliGRAM(s) every 6 hours PRN  acetylcysteine 10%  Inhalation 4 milliLiter(s) every 8 hours  albuterol/ipratropium for Nebulization 3 milliLiter(s) every 8 hours  artificial tears (preservative free) Ophthalmic Solution 1 Drop(s) every 4 hours  chlorhexidine 2% Cloths 1 Application(s) <User Schedule>  doxazosin 8 milliGRAM(s) at bedtime  erythromycin   Ointment 1 Application(s) at bedtime  heparin   Injectable 5000 Unit(s) every 8 hours  ofloxacin 0.3% Solution 1 Drop(s) four times a day  petrolatum Ophthalmic Ointment 1 Application(s) two times a day  senna 2 Tablet(s) at bedtime               INTERVAL COURSE / SUBJECTIVE:  Trach and PEG dependent. Uptrending BUN/Cr s/p IVF bolus. Afebrile.  Working with PT/OT without issues. Improved command following.  Pending guardianship hearing on 12/23. Waiting for Medicaid application then IRON.  -----------------------------------------------------------------------  REVIEW OF SYSTEMS:  Constitutional - No fever  Neurological - stable functional quqadriplegia  Pain - appears comfortable  Bowel - 12/1  Bladder - gabriel  -----------------------------------------------------------------------  FUNCTIONAL PROGRESS:    Physical Therapy: 12/2    Cognitive/Neuro/Behavioral  Level of Consciousness: alert  Arousal Level: arouses to voice  Orientation: situation;  disoriented to  Speech: unable to speak  Mood/Behavior: calm    Language Assistance  Preferred Language to Address Healthcare Preferred Language to Address Healthcare: [place]    Therapeutic Interventions      Bed-Chair Transfer Training  Transfer Training Bed-to-Chair Transfer: dependent (less than 25% patient effort);  2 person assist;  verbal cues  Transfer Training Chair-to-Bed Transfer: N/T as pt left seated in chair   Bed-to-Chair Transfer Training Transfer Safety Analysis: decreased balance;  decreased step length;  decreased weight-shifting ability;  decreased strength;  impaired balance;  impaired postural control    Neuromuscular Re-education  Neuromuscular Re-education Detail: Pt demo ability to track in all directions except to R, with L eye improved ability compared to R eye // Pt demo active cervical spine rotation in limited ROM and gravity eliminated position // Pt with LUE spontaneous and non-purposeful muscle contractions, pt with ability to actively squeeze R hand on command // Pt able to perform active R ankle PF/DF on command // Pt able to feel noxious stimuli on RUE and RLE // Pt with trace bilateral shoulder shrug       Occupational Therapy: 12/2    Bed-Chair Transfer Training  Transfer Training Bed-to-Chair Transfer: dependent (less than 25% patient effort);  lateral transfer via air tap  Transfer Training Chair-to-Bed Transfer: pt left in recliner chair   Bed-to-Chair Transfer Training Transfer Safety Analysis: decreased balance;  decreased strength;  impaired coordination;  impaired motor control    Therapeutic Exercise  Therapeutic Exercise Detail: Pt tolerating seated in recliner chair for 25 mins throughout session     Neuromuscular Re-education  Neuromuscular Re-education Detail: Pt demonstrates cervical rotation to L > R on command. Pt opens/ closes eyes to command with 100% accuracy. Pt raises eyebrows on command with 100% accuracy. Pt attempts smile on R; unable to stick out tongue. Pt with full vertical gaze, able to track vertical stim on command. Pt able to track with L eye to L visual field; unable to scan to R. Pt demonstrates gross grasp on command to R + R ankle dorsi/plantar flexion on command. Spontaneous LLE movement present throughout. Pt reports sensation on R; not able to ID sensation on L.     Lower Body Dressing Training  Lower Body Dressing Training Assistance: dependent (less than 25% patient effort)    OT Cognitive Treatment  OT Treatment: Cognitive Charges: Patient scored a (4/30) on the O-Log; max score for this pt given communication strategies would be /10. A score of >25 is associated with normal orientation. Pt requires cues to orient to month date situation. Pt demos 100% command follow to commands within available physical presentation       Speech Therapy:    -----------------------------------------------------------------------  VITALS  T(C): 36.6 (12-04-24 @ 04:42), Max: 36.8 (12-03-24 @ 09:00)  HR: 92 (12-04-24 @ 03:45) (66 - 92)  BP: 122/89 (12-04-24 @ 03:45) (117/88 - 148/102)  RR: 18 (12-04-24 @ 03:45) (16 - 18)  SpO2: 92% (12-04-24 @ 03:45) (92% - 100%)  Wt(kg): --    PHYSICAL EXAM  Constitutional - NAD, Comfortable  HEENT - NCAT  Chest - Breathing comfortably, +trach collar  Cardiovascular - Regular pulse  Abdomen - No visible abdominal distension  Extremities - No deformities of upper or lower limbs. No calf tenderness   MSK - ROM within functional limits  Neurologic Exam -                    Cognitive - Awake, follows commands inconsistently, squeezes hand on command. +vertical eye movements for yes/no questions     Communication - non-verbal     Cranial Nerves -  difficulty tracking     Motor - trace movement of right hand. No other voluntary movement at this time, although limited by fatigue   Psychiatric - Mood stable, Affect WNL     -----------------------------------------------------------------------  RECENT LABS  CBC Full  -  ( 03 Dec 2024 05:30 )  WBC Count : 7.46 K/uL  RBC Count : 3.64 M/uL  Hemoglobin : 10.9 g/dL  Hematocrit : 33.9 %  Platelet Count - Automated : 204 K/uL  Mean Cell Volume : 93.1 fl  Mean Cell Hemoglobin : 29.9 pg  Mean Cell Hemoglobin Concentration : 32.2 g/dL  Auto Neutrophil # : x  Auto Lymphocyte # : x  Auto Monocyte # : x  Auto Eosinophil # : x  Auto Basophil # : x  Auto Neutrophil % : x  Auto Lymphocyte % : x  Auto Monocyte % : x  Auto Eosinophil % : x  Auto Basophil % : x    12-03    137  |  101  |  60[H]  ----------------------------<  120[H]  3.5   |  23  |  1.22    Ca    9.2      03 Dec 2024 05:30  Phos  3.5     12-03  Mg     2.1     12-03      Urinalysis Basic - ( 03 Dec 2024 05:30 )    Color: x / Appearance: x / SG: x / pH: x  Gluc: 120 mg/dL / Ketone: x  / Bili: x / Urobili: x   Blood: x / Protein: x / Nitrite: x   Leuk Esterase: x / RBC: x / WBC x   Sq Epi: x / Non Sq Epi: x / Bacteria: x        -----------------------------------------------------------------------  IMAGING      -----------------------------------------------------------------------  MEDICATIONS   acetaminophen   Oral Liquid .. 650 milliGRAM(s) every 6 hours PRN  acetylcysteine 10%  Inhalation 4 milliLiter(s) every 8 hours  albuterol/ipratropium for Nebulization 3 milliLiter(s) every 8 hours  artificial tears (preservative free) Ophthalmic Solution 1 Drop(s) every 4 hours  chlorhexidine 2% Cloths 1 Application(s) <User Schedule>  doxazosin 8 milliGRAM(s) at bedtime  erythromycin   Ointment 1 Application(s) at bedtime  heparin   Injectable 5000 Unit(s) every 8 hours  ofloxacin 0.3% Solution 1 Drop(s) four times a day  petrolatum Ophthalmic Ointment 1 Application(s) two times a day  senna 2 Tablet(s) at bedtime

## 2024-12-04 NOTE — PROGRESS NOTE ADULT - SUBJECTIVE AND OBJECTIVE BOX
HPI:  Unknown age male, unknown past medical history (reported stroke and MI by coworkers) presented to [facility] with AMS, Pt was working at [employer] when he was found down by coworkers. EMS called and pt brought to [facility] ED. Intubated, sedated, started on cardene for SBPs in 200s. CT head showed brain stem bleed. Transferred to NSICU for further management.  (30 Sep 2024 12:55)    INTERVAL EVENTS:  GEORGE    HOSPITAL COURSE:  9/30: transferred from [facility]. A line placed. Versed dc'd. Cy Rader at bedside, states pt has family in [place], cannot confirm medications or PMH other than stroke and MI. 250cc bolus 3% given. LR switched to NS. hydralazine 25q8 started, 3% started, switched propofol to precedex   10/1: stability CTH done. Added labetalol, started TF. Palliative consulted. ethics consulted to determine surrogate. febrile 103, pan cx sent  10/2: BD 2, GEORGE overnight. TF resumed. Desatt'd to 80s, FiO2 inc. to 50. Fentanyl given, ABG, CXR ordered. Maxxed on precedex, started on propofol for DARIEN -4 - -5. Precedex dc'd. Duonebs, mucomyst, hypertonic added. 3% dc'd. Cardene dc'd. Start vanc/CTX. Increased labetalol 200q8. MRSA negative, dc'd vanc. ETT pulled back 2cm x 2, good positioning after confirmatory chest xray. Ethics attempting to establish HCP with family. Na 159, starting FW 250q6 for range 150-155.   10/3: BD3, GEORGE o/n, neuro stable. Na elevating, FW increased to 300q6. Dc'd bowel reg for diarrhea. vEEG started. SQH 5000q8 tonight.   10/4: BD 4, albumn bolus, incr. LR to 80 2/2 incr. in Cr, LR to 100cc/hr for uptrending Cr. Started 7% hypersal for 48hrs and SL atropine for inline/oral thick secretions. Dc'd CTX and started ancef for MSSA in the sputum. Nephrology consulted for CKD, f/u recs. SBP 170s, given hydralazine 10mg IVP.   10/5: BD5, o/n 10mg IVP hydralazine given for SBP 170s and started on hydralazine 25q8 via OGT. 10mg IV push labetalol for SBP > 160s. RT placed for diarrhea.   10/6: BD6, o/n FW increased to 350q4 per nephrology recs. IV tylenol for temp 100.6, SBp 160s presumed uncomfortable.   10/7: BD7, overnight pancultured for temp 101.8F.   10/8: BD8. GEORGE. Cr bumped. decreased LR to 75cc/hr. Adding simethicone ATC. incr hydralazine 50mgTID. Incr labetalol 300mgTID. Na 145, decreased FWF to 250q6. Start precedex. FENa consistent with intrinsic kidney injury. Pend repeat renal US. Retaining up to 1.3L, bladder scans q6, straight cath PRN  10/9: BD 9. GEORGE overnight. Neuro stable. abd xray for distention w non-specific gas pattern, OGT to LIWS for morning. duonebs/mucomyst to q8 for improving secretions. Changed tube feeds to Jevity 1.5 20cc/hr, low rate due to abdominal distention, nepro dense and more difficult to digest. Tolerating CPAP, confirmed by ABG.   10/10: BD 10. GEORGE overnight. Neuro stable. (+) gabriel for urinary retention on bladder scan. inc TF to goal rate of 40cc/hr. family leaning toward pursuing trach/PEG. 1/2 amp for FS 81.   10/11: BD 11. GEORGE overnight. Neuro stable. Trach/PEG consults placed.   10/12: BD 12. GEORGE overnight. Neuro stable. MRI brain complete.   10/13: BD 13. Increase flomax. Hold SQH after PM dose for trach tm. IVL.   10/14: BD 14. GEORGE overnight, remains on AC/VC. Gabriel placed for urinary retention. Dc'd free water.  S/p trach with pulm. NGT placed and CXR confirmed in good position.   10/15: BD 15, GEORGE ovn. resumed feeds. spiked 101, pan cx sent.   10/16: BD 16. GEORGE ovn. Lokelma 5mg for K+ 5.4. Started vanc q 24/zosyn for empiric PNA coverage, IVF to 100/hr. PEG held for fever.   10/17: BD 17,  ordered serum osm and urine osm for am. Started sinemet for neurostimulation. Increased cardura to 0.8. Started FW 100q4, dc'd IVF. MRSA negative, dc'd vanc. NGT replaced d/t coiling.   10/18: BD 18, GEORGE overnight, neuro stable. Amantadine added for neurostim. zosyn changed to unasyn for acinetobacter baumannii, failed TOV and required SC  10/19: BD 19, GEORGE ovn. cardura 2mg added for retention. labetalol decreased 200q8, hydralazine decreased 25q8. Gabriel replaced.   10/20: BD20, GEORGE overnight. NGT dislodged, replaced. PEG tomorrow w/ gen surg, FW increased to 150q4 and labetalol decreased to 100q8, lokelma given for hyperkalemia.   10/21: BD 21. POD0 PEG placement with Gen surg. decr labetolol to 50q8, incr. cardura to 0.4, started lokelma and phoslo, dc gabriel POD0 PEG placement with Gen surg.  10/22: BD 22. Plan to start TF today via PEG. dc labetalol, Following ophtho recs. Increased apnea settings - found to be in cheyne-moe respiration. CPAP 5/5.  10/23: BD 23. hydralazine d/c'd, trach collar trial today. Rectal tube placed at 6am.  10/24: BD24, o/n lokelma held due to diarrhea. Free water 100q6 resumed. dc'd tamsulosin, amantadine. Incr'd cardura to 8mg qhs. Dc'd FW. Switched jevity to nepro. gabriel placed for high urine output. Started SL atropine for oral secretions. Dc'd free water.  10/25: BD25, o/n decreased suctioning requirements to > q4hrs, GEORGE. Cr improving, cont phoslo, lokelma held at this time. Gabriel placed yest, cont. Tolerating trach collar. Given 500cc plasmalyte bolus for ANIKA. Dc'd sinemet.   10/26: BD26, o/n resumed lokelma 5mg daily and resumed 100cc free water q6hrs. Change in neuro status with new right pupillary dilation with anisocoria (right pupil 6mm fixed and left pupil 3mm briskly reactive). Given 23.4% NaCl bullet, taken for emergent CTH showing mostly resolved pontine hemorrhage, continued brainstem hypodensity likely edema d/t hemorrhage, no new hemorrhage or infarct, no herniation, mild increase in size of left lateral ventricle. Vitals remaine stable. Na goal > 140.   10/27: BD27, o/n GEORGE.Neuro stable. Pend stepdown with airway bed.   10/28: BD 28. GEORGE overnight. Neuro stable. Miralax ordered. Gabriel removed, pending TOV.  10/29: BD 29. GEORGE o/n. Given 2L NS over 8 hrs for increased BUN/Cr ratio. Gabriel placed for frequent straight cath.   10/30: BD 30.   10/31: BD 31. GEORGE overnight. Na 149, increased free water to 200q6. 1L NS for uptrending BUN.   11/1: BD 32. GEORGE overnight. Given 1L NS for dehydration. Na 146, increased FW to 250q6.   11/2: BD 33 GEORGE overnight, neuro stable, given 500cc bolus for net negative status and tachycardia   11/3: BD 34, GEORGE overnight, neuro stable. Patient remains tachycardic, EKG showing sinus tachycardia, given additional 500cc NS bolus. Febrile to 101.9F, pan cultured (without UA), CXR WNL, given tylenol.   11/4: BD 35, GEORGE overnight, neuro stable. Given 1L NS for tachycardia. sputum (+) for stenotropohomas maltophilia.   11/5: BD 36 GEORGE overnight, neuro stable. Vancomycin dc'd. Chest PT BID. ID consulted, cont zosyn.  11/6: BD 37. blood cx + klebsiella dc zosyn changed to cefepime, CTAP ordered, rpt blood cx sent.    11/7: BD 38. Pending CT A/P, given 250cc bolus and starting maintenance fluids overnight. Pending CT A/P after bolus   11/8: BD 39. CT CAP negative for infection.   11/9: BD 40. GEORGE overnight.  11/10: BD 41. GEORGE overnight. desat to 85 on trach collar, O2 inc to 10L and 100%, O2 sat inc to 95. pt tachy to 110s, euvolemic. given tylenol. ABG and CXR ordered. spiked fever, pancultured, RVP negative. AM ABG w pO2 79, rpt w pO2 79. pt appears comfortable, satting 94%.   11/11: BD 42. GEORGE overnight. pt became tachy to 130s, desat to 90 on 100% FiO2 and 10L. suctioned, (+) productive cough. temp 101.4, given 1g IV tylenol and 500cc NS bolus for euvolemia. fever and HR downtrending. LE dopplers negative for dvt  11/12: BD 43, GEORGE ovn, fever and HR downtrending, satting 97% 70% FIO2  11/13: BD 44, GEORGE ovn. started standing tylenol x24 hours for tachycardia. desat to 80s, o2 increased. CXR stable, pending CTA PE protocol.   11/14: BD 45, GEORGE overnight, neuro stable. resp therapy dec FiO2 to 70%.   11/15: BD 46, GEORGE overnight, neuro stable.  Rapid called for desaturation 30s, tachycardic 140s. Patient bagged, 100% fio2, heavily suctioned. CXR/POCUS unremarkable. ABG c/w desaturation. WBC 14.71. Afebrile. O2 improved to 90s and patient upgraded to ICU. ABG paO2 30s improved to 89 on vent. IV Tylenol x 1, sputum sent. Start protonix while o-n vent.   11/16: BD 47. POCUS showed collapsable IVCF, given 1L bolus. Vanco/Cefpime added empriic for PNA, NGT feeds restarted. MRSA swab neg, Vanc DC'd.   11/17: BD 48. GEORGE overnight. 1l bolus for tachycardia. Spiked to 101, cultured. 500cc bolus for tachycardia, tachy to 148 given 25mcg fentanyl, 250cc albumin, 1.5L bolus. 5 IV lopressor with response HR to 100s. +Stenotrophomonas on sputum cx.   11/18: BD 49. GEORGE overnight. Consulted ID, cefepime switched to bactrim x7days. Started hydrochlorothiazine 12.5mg daily.  11/19: BD 50. Tachy 120s, given tylenol and 500cc NS. Tolerating 5/5, switched to TCx. Holding phos binder. D/c Bactrim. D/c gabriel, f/u TOV. Dc'd PPI.   11/20: BD 51. GEORGE ovn. 1600 satting low 90s, mildly tachy to 110s, afebrile, RR wnl. O2 improved to mid 90s while inc O2 to 100% on TCx. CPAP 5/5 placed back on.  11/21: BD 52, GEORGE ovn, tolerating CPAP 5/5. Switch to trach collar during the day if tolerating well. HCTZ held for Cr bump, straight cath frequence increased to q4  11/22: BD 53, GEORGE ovn. Resumed phoslo. Gabriel placed. Resumed HCTZ.   11/23: BD 54. Holding tylenol in setting of possible fever, will require pan cx if febrile. Cr improved today. Cont CPAP. Bowel regimen held i/s/o diarrhea. FOBT negative.  11/24: BD 55. GEORGE overnight. Neuro stable. HCTZ dc'ed, started lisinopril 5. Lokelma dc'ed for K 3.7.   11/25: BD 56, GEORGE overnight. Neuro stable. dc'd lisinopril 5mg. Gabriel dc'd. TOV. 1545 noted to be hypotensive, MAP 50, in supine position on chair, HR 60s, afebrile, O2 96%. Given 1L cc NS bolus, placed back on bed in reverse trendelenberg, improved to map of 66. Neostick at bedside. Vitals check q1h. Dc'ed amlodipine. Failed TOV, bladder scan q6, sc prn. Added back Senna.   11/26: BD 57, GEORGE overnight. Neuro stable. Dc'd phoslo.   11/27: BD 58, GEORGE overnight. Neuro stable.    11/28: BD 59. Gabriel replaced.   11/29: GEORGE.  11/30: GEORGE, neuro stable.   12/1: BD 62, GEORGE overnight  12/2: BD 63, GEORGE overnight.; Given 1L bolus of LR for uptrending BUN/Cr.  12/3: BD 64. Reinstated eye gtt/moisture chamber given increased Rt eye injection. Attempted to speak with ophtho regarding eyelid weight/closure but no answer, full mailbox.   12/4: BD 65.       Vital Signs Last 24 Hrs  T(C): 36.4 (03 Dec 2024 22:09), Max: 36.9 (03 Dec 2024 05:00)  T(F): 97.5 (03 Dec 2024 22:09), Max: 98.5 (03 Dec 2024 05:00)  HR: 84 (04 Dec 2024 00:25) (66 - 84)  BP: 117/88 (04 Dec 2024 00:25) (117/88 - 148/102)  BP(mean): 99 (04 Dec 2024 00:25) (99 - 119)  RR: 16 (04 Dec 2024 00:25) (16 - 18)  SpO2: 92% (04 Dec 2024 00:25) (92% - 100%)    Parameters below as of 04 Dec 2024 00:25  Patient On (Oxygen Delivery Method): tracheostomy collar  O2 Flow (L/min): 10  O2 Concentration (%): 40    I&O's Summary    02 Dec 2024 07:01  -  03 Dec 2024 07:00  --------------------------------------------------------  IN: 2020 mL / OUT: 1370 mL / NET: 650 mL    03 Dec 2024 07:01  -  04 Dec 2024 00:39  --------------------------------------------------------  IN: 1490 mL / OUT: 925 mL / NET: 565 mL        PHYSICAL EXAM:  General: patient seen laying supine in bed in NAD  Neuro: OEs to voice, A&Ox0, R hand 2/5 and L triceps 3/5 to command, RLE withdraws to noxious, LLE trace withdrawal, LUE 0/5  HEENT: PERRL, only tracks vertically, +trach collar  Neck: supple  Cardiac: RRR, S1S2  Pulmonary: chest rise symmetric  Abdomen: soft, nontender, nondistended, +PEG  Ext: perfusing well  Skin: warm, dry      LABS:                        10.9   7.46  )-----------( 204      ( 03 Dec 2024 05:30 )             33.9     12-03    137  |  101  |  60[H]  ----------------------------<  120[H]  3.5   |  23  |  1.22    Ca    9.2      03 Dec 2024 05:30  Phos  3.5     12-03  Mg     2.1     12-03        Urinalysis Basic - ( 03 Dec 2024 05:30 )    Color: x / Appearance: x / SG: x / pH: x  Gluc: 120 mg/dL / Ketone: x  / Bili: x / Urobili: x   Blood: x / Protein: x / Nitrite: x   Leuk Esterase: x / RBC: x / WBC x   Sq Epi: x / Non Sq Epi: x / Bacteria: x          CAPILLARY BLOOD GLUCOSE          Drug Levels: [] N/A    CSF Analysis: [] N/A      Allergies    Allergy Status Unknown    Intolerances      MEDICATIONS:  Antibiotics:    Neuro:  acetaminophen   Oral Liquid .. 650 milliGRAM(s) Oral every 6 hours PRN    Anticoagulation:  heparin   Injectable 5000 Unit(s) SubCutaneous every 8 hours    OTHER:  acetylcysteine 10%  Inhalation 4 milliLiter(s) Inhalation every 8 hours  albuterol/ipratropium for Nebulization 3 milliLiter(s) Nebulizer every 8 hours  artificial tears (preservative free) Ophthalmic Solution 1 Drop(s) Right EYE every 4 hours  chlorhexidine 2% Cloths 1 Application(s) Topical <User Schedule>  doxazosin 8 milliGRAM(s) Oral at bedtime  erythromycin   Ointment 1 Application(s) Right EYE at bedtime  ofloxacin 0.3% Solution 1 Drop(s) Right EYE four times a day  petrolatum Ophthalmic Ointment 1 Application(s) Both EYES two times a day  senna 2 Tablet(s) Oral at bedtime    IVF:    CULTURES:  Culture Results:   Mixed gram negative rods including Moderate Stenotrophomonas maltophilia  Commensal iram consistent with body site (11-25 @ 00:19)    RADIOLOGY & ADDITIONAL TESTS:      ASSESSMENT:  45 y/o PMH ?stroke/MI present to [facility] after collapsing at work. Decorticate posturing, vomiting, intubated for airway protection. 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 Transferred to [facility] for further management. s/p trach 10/14. s/p peg 10/21. Re-upgrade to ICU 2/2 desaturation event and suctioning requirements 11/15.     PLAN:  Neuro:  - neuro/vitals q4h, protected sleep time 10PM-5AM  - pain control: tylenol prn  - vEEG (10/3-4)- negative, (10/17-10/19) - negative  - CTH 9/30: enlarged pontine hemorrhage, CTH 10/3: stable, CTH 10/25: mostly resolved pontine hemorrhage  - MRI brain 10/12: parenchymal hemorrhage, acute/subacute R cerebellar stroke    - stroke core measures, stroke neuro signed off    CV:  - -160  - HTN: hold amlodipine 10mg, hold lisinopril 5mg   - echo (9/30) EF 75%    Resp:  - trach collar   - Secretions: duonebs/mucomyst/chest PT q8h   - CTA chest 11/13 negative for PE, inc ground glass opacities    GI:  - TF via PEG (placed 10/21 by gen surg)  - bowel regimen, last BM 12/1  - CTAP 11/8 negative for infection    - FOBT negative 11/23    Renal:  - IVL  - FW flushes 250cc q6hrs for uptrending BUN/Cr  - Urinary retenion: Gabriel replaced 11/28  - CKD: trend BUN/Cr  - renal US 10/1: echogenicity c/w chronic med renal dz, repeat 10/8: inc renal echogeicity, c/f medical renal disease w increased hydro     Endo:  - A1c 5.4    Heme:  - DVT ppx: SCDs, SQH 5000u q8h   - LE dopplers negative 11/11    ID:  - febrile, last pancx 11/16, MRSA swab neg 11/16   -  s/p Stenotrophomonas maltophilia PNA: s/p Bactrim (11/18-11/19) s/p Cefepime (11/16-11/18)  - 11/3, (+) sputum for stenotrophomas maltophlia, blood cx (+) klebsiella, cefepime 2gq12 (11/6 - 11/12)   - empiric tx: s/p zosyn (11/3-11/6) , s/p vanc  (11/3- 11/5)  - S/p Ancef (10/4-10/14) for PNA, and s/p Unasyn (10/18-10/23) +actinobacter baumanii     MISC:  - ophtho consult for keratitis  - erythromycin ointment to rt eye q4hrs, ofloxacin ointment to rt eye QID, artificial tears to rt eye q2hrs, moisture chamber at bedtime    Dispo: tele status, full code, pending placement and guardianship hearing     D/w Dr. D'Amico  HPI:  Unknown age male, unknown past medical history (reported stroke and MI by coworkers) presented to [facility] with AMS, Pt was working at [employer] when he was found down by coworkers. EMS called and pt brought to [facility] ED. Intubated, sedated, started on cardene for SBPs in 200s. CT head showed brain stem bleed. Transferred to NSICU for further management.  (30 Sep 2024 12:55)    INTERVAL EVENTS:  GEORGE    HOSPITAL COURSE:  9/30: transferred from [facility]. A line placed. Versed dc'd. Cy Rader at bedside, states pt has family in [place], cannot confirm medications or PMH other than stroke and MI. 250cc bolus 3% given. LR switched to NS. hydralazine 25q8 started, 3% started, switched propofol to precedex   10/1: stability CTH done. Added labetalol, started TF. Palliative consulted. ethics consulted to determine surrogate. febrile 103, pan cx sent  10/2: BD 2, GEORGE overnight. TF resumed. Desatt'd to 80s, FiO2 inc. to 50. Fentanyl given, ABG, CXR ordered. Maxxed on precedex, started on propofol for DARIEN -4 - -5. Precedex dc'd. Duonebs, mucomyst, hypertonic added. 3% dc'd. Cardene dc'd. Start vanc/CTX. Increased labetalol 200q8. MRSA negative, dc'd vanc. ETT pulled back 2cm x 2, good positioning after confirmatory chest xray. Ethics attempting to establish HCP with family. Na 159, starting FW 250q6 for range 150-155.   10/3: BD3, GEORGE o/n, neuro stable. Na elevating, FW increased to 300q6. Dc'd bowel reg for diarrhea. vEEG started. SQH 5000q8 tonight.   10/4: BD 4, albumn bolus, incr. LR to 80 2/2 incr. in Cr, LR to 100cc/hr for uptrending Cr. Started 7% hypersal for 48hrs and SL atropine for inline/oral thick secretions. Dc'd CTX and started ancef for MSSA in the sputum. Nephrology consulted for CKD, f/u recs. SBP 170s, given hydralazine 10mg IVP.   10/5: BD5, o/n 10mg IVP hydralazine given for SBP 170s and started on hydralazine 25q8 via OGT. 10mg IV push labetalol for SBP > 160s. RT placed for diarrhea.   10/6: BD6, o/n FW increased to 350q4 per nephrology recs. IV tylenol for temp 100.6, SBp 160s presumed uncomfortable.   10/7: BD7, overnight pancultured for temp 101.8F.   10/8: BD8. GEORGE. Cr bumped. decreased LR to 75cc/hr. Adding simethicone ATC. incr hydralazine 50mgTID. Incr labetalol 300mgTID. Na 145, decreased FWF to 250q6. Start precedex. FENa consistent with intrinsic kidney injury. Pend repeat renal US. Retaining up to 1.3L, bladder scans q6, straight cath PRN  10/9: BD 9. GEORGE overnight. Neuro stable. abd xray for distention w non-specific gas pattern, OGT to LIWS for morning. duonebs/mucomyst to q8 for improving secretions. Changed tube feeds to Jevity 1.5 20cc/hr, low rate due to abdominal distention, nepro dense and more difficult to digest. Tolerating CPAP, confirmed by ABG.   10/10: BD 10. GEORGE overnight. Neuro stable. (+) gabriel for urinary retention on bladder scan. inc TF to goal rate of 40cc/hr. family leaning toward pursuing trach/PEG. 1/2 amp for FS 81.   10/11: BD 11. GEORGE overnight. Neuro stable. Trach/PEG consults placed.   10/12: BD 12. GEORGE overnight. Neuro stable. MRI brain complete.   10/13: BD 13. Increase flomax. Hold SQH after PM dose for trach tm. IVL.   10/14: BD 14. GEORGE overnight, remains on AC/VC. Gabriel placed for urinary retention. Dc'd free water.  S/p trach with pulm. NGT placed and CXR confirmed in good position.   10/15: BD 15, GEORGE ovn. resumed feeds. spiked 101, pan cx sent.   10/16: BD 16. GEORGE ovn. Lokelma 5mg for K+ 5.4. Started vanc q 24/zosyn for empiric PNA coverage, IVF to 100/hr. PEG held for fever.   10/17: BD 17,  ordered serum osm and urine osm for am. Started sinemet for neurostimulation. Increased cardura to 0.8. Started FW 100q4, dc'd IVF. MRSA negative, dc'd vanc. NGT replaced d/t coiling.   10/18: BD 18, GEORGE overnight, neuro stable. Amantadine added for neurostim. zosyn changed to unasyn for acinetobacter baumannii, failed TOV and required SC  10/19: BD 19, GEORGE ovn. cardura 2mg added for retention. labetalol decreased 200q8, hydralazine decreased 25q8. Gabriel replaced.   10/20: BD20, GEORGE overnight. NGT dislodged, replaced. PEG tomorrow w/ gen surg, FW increased to 150q4 and labetalol decreased to 100q8, lokelma given for hyperkalemia.   10/21: BD 21. POD0 PEG placement with Gen surg. decr labetolol to 50q8, incr. cardura to 0.4, started lokelma and phoslo, dc gabriel POD0 PEG placement with Gen surg.  10/22: BD 22. Plan to start TF today via PEG. dc labetalol, Following ophtho recs. Increased apnea settings - found to be in cheyne-moe respiration. CPAP 5/5.  10/23: BD 23. hydralazine d/c'd, trach collar trial today. Rectal tube placed at 6am.  10/24: BD24, o/n lokelma held due to diarrhea. Free water 100q6 resumed. dc'd tamsulosin, amantadine. Incr'd cardura to 8mg qhs. Dc'd FW. Switched jevity to nepro. gabriel placed for high urine output. Started SL atropine for oral secretions. Dc'd free water.  10/25: BD25, o/n decreased suctioning requirements to > q4hrs, GEORGE. Cr improving, cont phoslo, lokelma held at this time. Gabriel placed yest, cont. Tolerating trach collar. Given 500cc plasmalyte bolus for ANIKA. Dc'd sinemet.   10/26: BD26, o/n resumed lokelma 5mg daily and resumed 100cc free water q6hrs. Change in neuro status with new right pupillary dilation with anisocoria (right pupil 6mm fixed and left pupil 3mm briskly reactive). Given 23.4% NaCl bullet, taken for emergent CTH showing mostly resolved pontine hemorrhage, continued brainstem hypodensity likely edema d/t hemorrhage, no new hemorrhage or infarct, no herniation, mild increase in size of left lateral ventricle. Vitals remaine stable. Na goal > 140.   10/27: BD27, o/n GEORGE.Neuro stable. Pend stepdown with airway bed.   10/28: BD 28. GEORGE overnight. Neuro stable. Miralax ordered. Gabriel removed, pending TOV.  10/29: BD 29. GEORGE o/n. Given 2L NS over 8 hrs for increased BUN/Cr ratio. Gabriel placed for frequent straight cath.   10/30: BD 30.   10/31: BD 31. GEORGE overnight. Na 149, increased free water to 200q6. 1L NS for uptrending BUN.   11/1: BD 32. GOERGE overnight. Given 1L NS for dehydration. Na 146, increased FW to 250q6.   11/2: BD 33 GEORGE overnight, neuro stable, given 500cc bolus for net negative status and tachycardia   11/3: BD 34, GEORGE overnight, neuro stable. Patient remains tachycardic, EKG showing sinus tachycardia, given additional 500cc NS bolus. Febrile to 101.9F, pan cultured (without UA), CXR WNL, given tylenol.   11/4: BD 35, GEORGE overnight, neuro stable. Given 1L NS for tachycardia. sputum (+) for stenotropohomas maltophilia.   11/5: BD 36 GEORGE overnight, neuro stable. Vancomycin dc'd. Chest PT BID. ID consulted, cont zosyn.  11/6: BD 37. blood cx + klebsiella dc zosyn changed to cefepime, CTAP ordered, rpt blood cx sent.    11/7: BD 38. Pending CT A/P, given 250cc bolus and starting maintenance fluids overnight. Pending CT A/P after bolus   11/8: BD 39. CT CAP negative for infection.   11/9: BD 40. GEORGE overnight.  11/10: BD 41. GEORGE overnight. desat to 85 on trach collar, O2 inc to 10L and 100%, O2 sat inc to 95. pt tachy to 110s, euvolemic. given tylenol. ABG and CXR ordered. spiked fever, pancultured, RVP negative. AM ABG w pO2 79, rpt w pO2 79. pt appears comfortable, satting 94%.   11/11: BD 42. GEORGE overnight. pt became tachy to 130s, desat to 90 on 100% FiO2 and 10L. suctioned, (+) productive cough. temp 101.4, given 1g IV tylenol and 500cc NS bolus for euvolemia. fever and HR downtrending. LE dopplers negative for dvt  11/12: BD 43, GEORGE ovn, fever and HR downtrending, satting 97% 70% FIO2  11/13: BD 44, GEORGE ovn. started standing tylenol x24 hours for tachycardia. desat to 80s, o2 increased. CXR stable, pending CTA PE protocol.   11/14: BD 45, GEORGE overnight, neuro stable. resp therapy dec FiO2 to 70%.   11/15: BD 46, GEORGE overnight, neuro stable.  Rapid called for desaturation 30s, tachycardic 140s. Patient bagged, 100% fio2, heavily suctioned. CXR/POCUS unremarkable. ABG c/w desaturation. WBC 14.71. Afebrile. O2 improved to 90s and patient upgraded to ICU. ABG paO2 30s improved to 89 on vent. IV Tylenol x 1, sputum sent. Start protonix while o-n vent.   11/16: BD 47. POCUS showed collapsable IVCF, given 1L bolus. Vanco/Cefpime added empriic for PNA, NGT feeds restarted. MRSA swab neg, Vanc DC'd.   11/17: BD 48. GEORGE overnight. 1l bolus for tachycardia. Spiked to 101, cultured. 500cc bolus for tachycardia, tachy to 148 given 25mcg fentanyl, 250cc albumin, 1.5L bolus. 5 IV lopressor with response HR to 100s. +Stenotrophomonas on sputum cx.   11/18: BD 49. GEORGE overnight. Consulted ID, cefepime switched to bactrim x7days. Started hydrochlorothiazine 12.5mg daily.  11/19: BD 50. Tachy 120s, given tylenol and 500cc NS. Tolerating 5/5, switched to TCx. Holding phos binder. D/c Bactrim. D/c gabriel, f/u TOV. Dc'd PPI.   11/20: BD 51. GEORGE ovn. 1600 satting low 90s, mildly tachy to 110s, afebrile, RR wnl. O2 improved to mid 90s while inc O2 to 100% on TCx. CPAP 5/5 placed back on.  11/21: BD 52, GEORGE ovn, tolerating CPAP 5/5. Switch to trach collar during the day if tolerating well. HCTZ held for Cr bump, straight cath frequence increased to q4  11/22: BD 53, GEORGE ovn. Resumed phoslo. Gabriel placed. Resumed HCTZ.   11/23: BD 54. Holding tylenol in setting of possible fever, will require pan cx if febrile. Cr improved today. Cont CPAP. Bowel regimen held i/s/o diarrhea. FOBT negative.  11/24: BD 55. GEORGE overnight. Neuro stable. HCTZ dc'ed, started lisinopril 5. Lokelma dc'ed for K 3.7.   11/25: BD 56, GEORGE overnight. Neuro stable. dc'd lisinopril 5mg. Gabriel dc'd. TOV. 1545 noted to be hypotensive, MAP 50, in supine position on chair, HR 60s, afebrile, O2 96%. Given 1L cc NS bolus, placed back on bed in reverse trendelenberg, improved to map of 66. Neostick at bedside. Vitals check q1h. Dc'ed amlodipine. Failed TOV, bladder scan q6, sc prn. Added back Senna.   11/26: BD 57, GEORGE overnight. Neuro stable. Dc'd phoslo.   11/27: BD 58, GEORGE overnight. Neuro stable.    11/28: BD 59. Gabriel replaced.   11/29: GEORGE.  11/30: GEORGE, neuro stable.   12/1: BD 62, GEORGE overnight  12/2: BD 63, GEORGE overnight.; Given 1L bolus of LR for uptrending BUN/Cr.  12/3: BD 64. Reinstated eye gtt/moisture chamber given increased Rt eye injection. Attempted to speak with ophtho regarding eyelid weight/closure but no answer, full mailbox.   12/4: BD 65.       Vital Signs Last 24 Hrs  T(C): 36.4 (03 Dec 2024 22:09), Max: 36.9 (03 Dec 2024 05:00)  T(F): 97.5 (03 Dec 2024 22:09), Max: 98.5 (03 Dec 2024 05:00)  HR: 84 (04 Dec 2024 00:25) (66 - 84)  BP: 117/88 (04 Dec 2024 00:25) (117/88 - 148/102)  BP(mean): 99 (04 Dec 2024 00:25) (99 - 119)  RR: 16 (04 Dec 2024 00:25) (16 - 18)  SpO2: 92% (04 Dec 2024 00:25) (92% - 100%)    Parameters below as of 04 Dec 2024 00:25  Patient On (Oxygen Delivery Method): tracheostomy collar  O2 Flow (L/min): 10  O2 Concentration (%): 40    I&O's Summary    02 Dec 2024 07:01  -  03 Dec 2024 07:00  --------------------------------------------------------  IN: 2020 mL / OUT: 1370 mL / NET: 650 mL    03 Dec 2024 07:01  -  04 Dec 2024 00:39  --------------------------------------------------------  IN: 1490 mL / OUT: 925 mL / NET: 565 mL        PHYSICAL EXAM:  General: patient seen laying supine in bed in NAD  Neuro: OEs to voice, A&Ox0, R hand 2/5 and R triceps 3/5 to command, RLE withdraws to noxious, LLE trace withdrawal, LUE 0/5  HEENT: PERRL, only tracks vertically, +trach collar  Neck: supple  Cardiac: RRR, S1S2  Pulmonary: chest rise symmetric  Abdomen: soft, nontender, nondistended, +PEG  Ext: perfusing well  Skin: warm, dry      LABS:                        10.9   7.46  )-----------( 204      ( 03 Dec 2024 05:30 )             33.9     12-03    137  |  101  |  60[H]  ----------------------------<  120[H]  3.5   |  23  |  1.22    Ca    9.2      03 Dec 2024 05:30  Phos  3.5     12-03  Mg     2.1     12-03        Urinalysis Basic - ( 03 Dec 2024 05:30 )    Color: x / Appearance: x / SG: x / pH: x  Gluc: 120 mg/dL / Ketone: x  / Bili: x / Urobili: x   Blood: x / Protein: x / Nitrite: x   Leuk Esterase: x / RBC: x / WBC x   Sq Epi: x / Non Sq Epi: x / Bacteria: x          CAPILLARY BLOOD GLUCOSE          Drug Levels: [] N/A    CSF Analysis: [] N/A      Allergies    Allergy Status Unknown    Intolerances      MEDICATIONS:  Antibiotics:    Neuro:  acetaminophen   Oral Liquid .. 650 milliGRAM(s) Oral every 6 hours PRN    Anticoagulation:  heparin   Injectable 5000 Unit(s) SubCutaneous every 8 hours    OTHER:  acetylcysteine 10%  Inhalation 4 milliLiter(s) Inhalation every 8 hours  albuterol/ipratropium for Nebulization 3 milliLiter(s) Nebulizer every 8 hours  artificial tears (preservative free) Ophthalmic Solution 1 Drop(s) Right EYE every 4 hours  chlorhexidine 2% Cloths 1 Application(s) Topical <User Schedule>  doxazosin 8 milliGRAM(s) Oral at bedtime  erythromycin   Ointment 1 Application(s) Right EYE at bedtime  ofloxacin 0.3% Solution 1 Drop(s) Right EYE four times a day  petrolatum Ophthalmic Ointment 1 Application(s) Both EYES two times a day  senna 2 Tablet(s) Oral at bedtime    IVF:    CULTURES:  Culture Results:   Mixed gram negative rods including Moderate Stenotrophomonas maltophilia  Commensal iram consistent with body site (11-25 @ 00:19)    RADIOLOGY & ADDITIONAL TESTS:      ASSESSMENT:  45 y/o PMH ?stroke/MI present to [facility] after collapsing at work. Decorticate posturing, vomiting, intubated for airway protection. 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 Transferred to [facility] for further management. s/p trach 10/14. s/p peg 10/21. Re-upgrade to ICU 2/2 desaturation event and suctioning requirements 11/15.     PLAN:  Neuro:  - neuro/vitals q4h, protected sleep time 10PM-5AM  - pain control: tylenol prn  - vEEG (10/3-4)- negative, (10/17-10/19) - negative  - CTH 9/30: enlarged pontine hemorrhage, CTH 10/3: stable, CTH 10/25: mostly resolved pontine hemorrhage  - MRI brain 10/12: parenchymal hemorrhage, acute/subacute R cerebellar stroke    - stroke core measures, stroke neuro signed off    CV:  - -160  - HTN: hold amlodipine 10mg, hold lisinopril 5mg   - echo (9/30) EF 75%    Resp:  - trach collar   - Secretions: duonebs/mucomyst/chest PT q8h   - CTA chest 11/13 negative for PE, inc ground glass opacities    GI:  - TF via PEG (placed 10/21 by gen surg)  - bowel regimen, last BM 12/1  - CTAP 11/8 negative for infection    - FOBT negative 11/23    Renal:  - IVL  - FW flushes 250cc q6hrs for uptrending BUN/Cr  - Urinary retenion: Gabriel replaced 11/28  - CKD: trend BUN/Cr  - renal US 10/1: echogenicity c/w chronic med renal dz, repeat 10/8: inc renal echogeicity, c/f medical renal disease w increased hydro     Endo:  - A1c 5.4    Heme:  - DVT ppx: SCDs, SQH 5000u q8h   - LE dopplers negative 11/11    ID:  - febrile, last pancx 11/16, MRSA swab neg 11/16   -  s/p Stenotrophomonas maltophilia PNA: s/p Bactrim (11/18-11/19) s/p Cefepime (11/16-11/18)  - 11/3, (+) sputum for stenotrophomas maltophlia, blood cx (+) klebsiella, cefepime 2gq12 (11/6 - 11/12)   - empiric tx: s/p zosyn (11/3-11/6) , s/p vanc  (11/3- 11/5)  - S/p Ancef (10/4-10/14) for PNA, and s/p Unasyn (10/18-10/23) +actinobacter baumanii     MISC:  - ophtho consult for keratitis  - erythromycin ointment to rt eye q4hrs, ofloxacin ointment to rt eye QID, artificial tears to rt eye q2hrs, moisture chamber at bedtime    Dispo: tele status, full code, pending placement and guardianship hearing     D/w Dr. D'Amico

## 2024-12-04 NOTE — PROGRESS NOTE ADULT - SUBJECTIVE AND OBJECTIVE BOX
Patient is a 44y old  Male who presents with a chief complaint of brain stem bleed (04 Dec 2024 08:01)        SUBJECTIVE:  Patient was seen and examined at bedside, no WOb on trach collar.    Overnight Events :     Last Bowel Movement: 30-Nov-2024 (12-04)  Last Bowel Movement: 30-Nov-2024 (12-03)  Last Bowel Movement: 30-Nov-2024 (12-03)  Last Bowel Movement: 30-Nov-2024 (12-02)  Last Bowel Movement: 30-Nov-2024 (12-01)  Last Bowel Movement: 30-Nov-2024 (12-01)  Last Bowel Movement: 28-Nov-2024 (11-28)      Review of systems: 12 point Review of systems negative unless otherwise documented elsewhere in note.     Diet, NPO with Tube Feed:   Tube Feeding Modality: Gastrostomy  [place] Renal Support 1.8  Total Volume for 24 Hours (mL): 1080  Total Number of Cans: 5  Continuous  Starting Tube Feed Rate mL per Hour: 45  Increase Tube Feed Rate by (mL): 10     Every 4 hours  Until Goal Tube Feed Rate (mL per Hour): 60  Tube Feed Duration (in Hours): 18  Tube Feed Start Time: 10:00  Tube Feed Stop Time: 04:00  Banatrol TF     Qty per Day:  2 (11-20-24 @ 10:26) [Active]      MEDICATIONS:  MEDICATIONS  (STANDING):  acetylcysteine 10%  Inhalation 4 milliLiter(s) Inhalation every 8 hours  albuterol/ipratropium for Nebulization 3 milliLiter(s) Nebulizer every 8 hours  artificial tears (preservative free) Ophthalmic Solution 1 Drop(s) Right EYE every 4 hours  chlorhexidine 2% Cloths 1 Application(s) Topical <User Schedule>  doxazosin 8 milliGRAM(s) Oral at bedtime  erythromycin   Ointment 1 Application(s) Right EYE at bedtime  heparin   Injectable 5000 Unit(s) SubCutaneous every 8 hours  ofloxacin 0.3% Solution 1 Drop(s) Right EYE four times a day  petrolatum Ophthalmic Ointment 1 Application(s) Both EYES two times a day  senna 2 Tablet(s) Oral at bedtime    MEDICATIONS  (PRN):  acetaminophen   Oral Liquid .. 650 milliGRAM(s) Oral every 6 hours PRN Temp greater or equal to 38.5C (101.3F), Mild Pain (1 - 3)      Allergies    Allergy Status Unknown    Intolerances        OBJECTIVE:  Vital Signs Last 24 Hrs  T(C): 36.6 (04 Dec 2024 04:42), Max: 36.6 (04 Dec 2024 04:42)  T(F): 97.9 (04 Dec 2024 04:42), Max: 97.9 (04 Dec 2024 04:42)  HR: 70 (04 Dec 2024 09:30) (66 - 92)  BP: 119/94 (04 Dec 2024 08:25) (117/88 - 148/102)  BP(mean): 103 (04 Dec 2024 08:25) (99 - 119)  RR: 18 (04 Dec 2024 09:30) (16 - 18)  SpO2: 97% (04 Dec 2024 09:30) (92% - 100%)    Parameters below as of 04 Dec 2024 09:30  Patient On (Oxygen Delivery Method): tracheostomy collar  O2 Flow (L/min): 10  O2 Concentration (%): 40  I&O's Summary    03 Dec 2024 07:01  -  04 Dec 2024 07:00  --------------------------------------------------------  IN: 1790 mL / OUT: 1375 mL / NET: 415 mL        PHYSICAL EXAM:  General: awake, not tracking, looking comfortable, no WOB on trach collar  HEENT: tracheostomy clean  Lungs: poor inspiration, no crackles, no wheezes  Heart: regular  Abdomen: soft, no visible tenderness, + BS  Extremities: warm, no edema, not following commands      LABS:                        10.9   7.46  )-----------( 204      ( 03 Dec 2024 05:30 )             33.9     12-03    137  |  101  |  60[H]  ----------------------------<  120[H]  3.5   |  23  |  1.22    Ca    9.2      03 Dec 2024 05:30  Phos  3.5     12-03  Mg     2.1     12-03          CAPILLARY BLOOD GLUCOSE        Urinalysis Basic - ( 03 Dec 2024 05:30 )    Color: x / Appearance: x / SG: x / pH: x  Gluc: 120 mg/dL / Ketone: x  / Bili: x / Urobili: x   Blood: x / Protein: x / Nitrite: x   Leuk Esterase: x / RBC: x / WBC x   Sq Epi: x / Non Sq Epi: x / Bacteria: x

## 2024-12-05 LAB
ANION GAP SERPL CALC-SCNC: 13 MMOL/L — SIGNIFICANT CHANGE UP (ref 5–17)
BUN SERPL-MCNC: 58 MG/DL — HIGH (ref 7–23)
CALCIUM SERPL-MCNC: 9.4 MG/DL — SIGNIFICANT CHANGE UP (ref 8.4–10.5)
CHLORIDE SERPL-SCNC: 104 MMOL/L — SIGNIFICANT CHANGE UP (ref 96–108)
CO2 SERPL-SCNC: 22 MMOL/L — SIGNIFICANT CHANGE UP (ref 22–31)
CREAT SERPL-MCNC: 1.25 MG/DL — SIGNIFICANT CHANGE UP (ref 0.5–1.3)
EGFR: 72 ML/MIN/1.73M2 — SIGNIFICANT CHANGE UP
EGFR: 72 ML/MIN/1.73M2 — SIGNIFICANT CHANGE UP
GLUCOSE SERPL-MCNC: 137 MG/DL — HIGH (ref 70–99)
HCT VFR BLD CALC: 36.6 % — LOW (ref 39–50)
HGB BLD-MCNC: 11.3 G/DL — LOW (ref 13–17)
MAGNESIUM SERPL-MCNC: 2.2 MG/DL — SIGNIFICANT CHANGE UP (ref 1.6–2.6)
MCHC RBC-ENTMCNC: 28.7 PG — SIGNIFICANT CHANGE UP (ref 27–34)
MCHC RBC-ENTMCNC: 30.9 G/DL — LOW (ref 32–36)
MCV RBC AUTO: 92.9 FL — SIGNIFICANT CHANGE UP (ref 80–100)
NRBC # BLD: 0 /100 WBCS — SIGNIFICANT CHANGE UP (ref 0–0)
NRBC BLD-RTO: 0 /100 WBCS — SIGNIFICANT CHANGE UP (ref 0–0)
PHOSPHATE SERPL-MCNC: 3.9 MG/DL — SIGNIFICANT CHANGE UP (ref 2.5–4.5)
PLATELET # BLD AUTO: 191 K/UL — SIGNIFICANT CHANGE UP (ref 150–400)
POTASSIUM SERPL-MCNC: 3.8 MMOL/L — SIGNIFICANT CHANGE UP (ref 3.5–5.3)
POTASSIUM SERPL-SCNC: 3.8 MMOL/L — SIGNIFICANT CHANGE UP (ref 3.5–5.3)
RBC # BLD: 3.94 M/UL — LOW (ref 4.2–5.8)
RBC # FLD: 14.4 % — SIGNIFICANT CHANGE UP (ref 10.3–14.5)
SODIUM SERPL-SCNC: 139 MMOL/L — SIGNIFICANT CHANGE UP (ref 135–145)
WBC # BLD: 7.53 K/UL — SIGNIFICANT CHANGE UP (ref 3.8–10.5)
WBC # FLD AUTO: 7.53 K/UL — SIGNIFICANT CHANGE UP (ref 3.8–10.5)

## 2024-12-05 PROCEDURE — 99232 SBSQ HOSP IP/OBS MODERATE 35: CPT

## 2024-12-05 RX ORDER — POLYETHYLENE GLYCOL 3350 17 G/17G
17 POWDER, FOR SOLUTION ORAL DAILY
Refills: 0 | Status: DISCONTINUED | OUTPATIENT
Start: 2024-12-05 | End: 2024-12-12

## 2024-12-05 RX ADMIN — OFLOXACIN 1 DROP(S): 3 SOLUTION OPHTHALMIC at 17:58

## 2024-12-05 RX ADMIN — IPRATROPIUM BROMIDE AND ALBUTEROL SULFATE 3 MILLILITER(S): .5; 2.5 SOLUTION RESPIRATORY (INHALATION) at 21:26

## 2024-12-05 RX ADMIN — Medication 1 DROP(S): at 05:42

## 2024-12-05 RX ADMIN — Medication 1 APPLICATION(S): at 18:00

## 2024-12-05 RX ADMIN — DOXAZOSIN MESYLATE 8 MILLIGRAM(S): 8 TABLET ORAL at 21:27

## 2024-12-05 RX ADMIN — HEPARIN SODIUM 5000 UNIT(S): 1000 INJECTION INTRAVENOUS; SUBCUTANEOUS at 13:41

## 2024-12-05 RX ADMIN — IPRATROPIUM BROMIDE AND ALBUTEROL SULFATE 3 MILLILITER(S): .5; 2.5 SOLUTION RESPIRATORY (INHALATION) at 13:41

## 2024-12-05 RX ADMIN — OFLOXACIN 1 DROP(S): 3 SOLUTION OPHTHALMIC at 05:41

## 2024-12-05 RX ADMIN — Medication 1 APPLICATION(S): at 06:44

## 2024-12-05 RX ADMIN — Medication 1 DROP(S): at 21:27

## 2024-12-05 RX ADMIN — Medication 1 DROP(S): at 10:01

## 2024-12-05 RX ADMIN — ACETYLCYSTEINE 4 MILLILITER(S): 200 INHALANT RESPIRATORY (INHALATION) at 05:41

## 2024-12-05 RX ADMIN — OFLOXACIN 1 DROP(S): 3 SOLUTION OPHTHALMIC at 00:59

## 2024-12-05 RX ADMIN — Medication 1 APPLICATION(S): at 05:42

## 2024-12-05 RX ADMIN — ACETYLCYSTEINE 4 MILLILITER(S): 200 INHALANT RESPIRATORY (INHALATION) at 21:26

## 2024-12-05 RX ADMIN — Medication 1 DROP(S): at 17:59

## 2024-12-05 RX ADMIN — OFLOXACIN 1 DROP(S): 3 SOLUTION OPHTHALMIC at 13:42

## 2024-12-05 RX ADMIN — Medication 650 MILLIGRAM(S): at 14:45

## 2024-12-05 RX ADMIN — Medication 20 MILLIEQUIVALENT(S): at 07:43

## 2024-12-05 RX ADMIN — Medication 2 TABLET(S): at 21:26

## 2024-12-05 RX ADMIN — Medication 1 DROP(S): at 01:00

## 2024-12-05 RX ADMIN — ERYTHROMYCIN 1 APPLICATION(S): 5 OINTMENT OPHTHALMIC at 21:27

## 2024-12-05 RX ADMIN — POLYETHYLENE GLYCOL 3350 17 GRAM(S): 17 POWDER, FOR SOLUTION ORAL at 05:41

## 2024-12-05 RX ADMIN — HEPARIN SODIUM 5000 UNIT(S): 1000 INJECTION INTRAVENOUS; SUBCUTANEOUS at 05:41

## 2024-12-05 RX ADMIN — IPRATROPIUM BROMIDE AND ALBUTEROL SULFATE 3 MILLILITER(S): .5; 2.5 SOLUTION RESPIRATORY (INHALATION) at 05:42

## 2024-12-05 RX ADMIN — Medication 1 DROP(S): at 13:42

## 2024-12-05 RX ADMIN — ACETYLCYSTEINE 4 MILLILITER(S): 200 INHALANT RESPIRATORY (INHALATION) at 14:19

## 2024-12-05 RX ADMIN — HEPARIN SODIUM 5000 UNIT(S): 1000 INJECTION INTRAVENOUS; SUBCUTANEOUS at 21:26

## 2024-12-05 RX ADMIN — Medication 650 MILLIGRAM(S): at 13:44

## 2024-12-05 RX ADMIN — POLYETHYLENE GLYCOL 3350 17 GRAM(S): 17 POWDER, FOR SOLUTION ORAL at 13:40

## 2024-12-05 NOTE — PROGRESS NOTE ADULT - SUBJECTIVE AND OBJECTIVE BOX
Patient is a 44y old  Male who presents with a chief complaint of brain stem bleed (05 Dec 2024 01:39)        SUBJECTIVE:  Patient was seen and examined at bedside.    Overnight Events :     Last Bowel Movement: 30-Nov-2024 (12-04)  Last Bowel Movement: 30-Nov-2024 (12-04)  Last Bowel Movement: 30-Nov-2024 (12-03)  Last Bowel Movement: 30-Nov-2024 (12-03)  Last Bowel Movement: 30-Nov-2024 (12-02)  Last Bowel Movement: 30-Nov-2024 (12-01)  Last Bowel Movement: 30-Nov-2024 (12-01)  Last Bowel Movement: 28-Nov-2024 (11-28)      Review of systems: 12 point Review of systems negative unless otherwise documented elsewhere in note.     Diet, NPO with Tube Feed:   Tube Feeding Modality: Gastrostomy  [place] Renal Support 1.8  Total Volume for 24 Hours (mL): 1080  Total Number of Cans: 5  Continuous  Starting Tube Feed Rate mL per Hour: 45  Increase Tube Feed Rate by (mL): 10     Every 4 hours  Until Goal Tube Feed Rate (mL per Hour): 60  Tube Feed Duration (in Hours): 18  Tube Feed Start Time: 10:00  Tube Feed Stop Time: 04:00  Banatrol TF     Qty per Day:  2 (11-20-24 @ 10:26) [Active]      MEDICATIONS:  MEDICATIONS  (STANDING):  acetylcysteine 10%  Inhalation 4 milliLiter(s) Inhalation every 8 hours  albuterol/ipratropium for Nebulization 3 milliLiter(s) Nebulizer every 8 hours  artificial tears (preservative free) Ophthalmic Solution 1 Drop(s) Right EYE every 4 hours  chlorhexidine 2% Cloths 1 Application(s) Topical <User Schedule>  doxazosin 8 milliGRAM(s) Oral at bedtime  erythromycin   Ointment 1 Application(s) Right EYE at bedtime  heparin   Injectable 5000 Unit(s) SubCutaneous every 8 hours  ofloxacin 0.3% Solution 1 Drop(s) Right EYE four times a day  petrolatum Ophthalmic Ointment 1 Application(s) Both EYES two times a day  polyethylene glycol 3350 17 Gram(s) Oral daily  senna 2 Tablet(s) Oral at bedtime    MEDICATIONS  (PRN):  acetaminophen   Oral Liquid .. 650 milliGRAM(s) Oral every 6 hours PRN Temp greater or equal to 38.5C (101.3F), Mild Pain (1 - 3)      Allergies    Allergy Status Unknown    Intolerances        OBJECTIVE:  Vital Signs Last 24 Hrs  T(C): 37.6 (05 Dec 2024 08:55), Max: 37.7 (04 Dec 2024 22:17)  T(F): 99.7 (05 Dec 2024 08:55), Max: 99.9 (04 Dec 2024 22:17)  HR: 72 (05 Dec 2024 08:43) (72 - 86)  BP: 130/95 (05 Dec 2024 08:43) (120/84 - 130/96)  BP(mean): 110 (05 Dec 2024 08:43) (96 - 110)  RR: 17 (05 Dec 2024 08:43) (16 - 18)  SpO2: 100% (05 Dec 2024 08:43) (93% - 100%)    Parameters below as of 05 Dec 2024 09:45  Patient On (Oxygen Delivery Method): tracheostomy collar      I&O's Summary    04 Dec 2024 07:01  -  05 Dec 2024 07:00  --------------------------------------------------------  IN: 920 mL / OUT: 925 mL / NET: -5 mL    05 Dec 2024 07:01  -  05 Dec 2024 11:47  --------------------------------------------------------  IN: 0 mL / OUT: 0 mL / NET: 0 mL        PHYSICAL EXAM:  General: awake, looking comfortable, no WOB on trach collar, ptn gaze appeared more focused on speaker during exam today, also appreciate ptn tracking to the left and back to midline, not tracking to the right  HEENT: tracheostomy clean  Lungs: poor inspiration, no crackles, no wheezes  Heart: regular  Abdomen: soft, no visible tenderness, + BS  Extremities: warm, no edema, not moving to command    LABS:                        11.3   7.53  )-----------( 191      ( 05 Dec 2024 05:30 )             36.6     12-05    139  |  104  |  58[H]  ----------------------------<  137[H]  3.8   |  22  |  1.25    Ca    9.4      05 Dec 2024 05:30  Phos  3.9     12-05  Mg     2.2     12-05          CAPILLARY BLOOD GLUCOSE        Urinalysis Basic - ( 05 Dec 2024 05:30 )    Color: x / Appearance: x / SG: x / pH: x  Gluc: 137 mg/dL / Ketone: x  / Bili: x / Urobili: x   Blood: x / Protein: x / Nitrite: x   Leuk Esterase: x / RBC: x / WBC x   Sq Epi: x / Non Sq Epi: x / Bacteria: x

## 2024-12-05 NOTE — PROGRESS NOTE ADULT - SUBJECTIVE AND OBJECTIVE BOX
HPI:  Unknown age male, unknown past medical history (reported stroke and MI by coworkers) presented to [facility] with AMS, Pt was working at [employer] when he was found down by coworkers. EMS called and pt brought to [facility] ED. Intubated, sedated, started on cardene for SBPs in 200s. CT head showed brain stem bleed. Transferred to NSICU for further management.  (30 Sep 2024 12:55)      Subjective:  GEORGE overnight. Bowel regimen increased.     Hospital course:  9/30: transferred from [facility]. A line placed. Versed dc'd. Cy Rader at bedside, states pt has family in [place], cannot confirm medications or PMH other than stroke and MI. 250cc bolus 3% given. LR switched to NS. hydralazine 25q8 started, 3% started, switched propofol to precedex   10/1: stability CTH done. Added labetalol, started TF. Palliative consulted. ethics consulted to determine surrogate. febrile 103, pan cx sent  10/2: BD 2, GEORGE overnight. TF resumed. Desatt'd to 80s, FiO2 inc. to 50. Fentanyl given, ABG, CXR ordered. Maxxed on precedex, started on propofol for DARIEN -4 - -5. Precedex dc'd. Duonebs, mucomyst, hypertonic added. 3% dc'd. Cardene dc'd. Start vanc/CTX. Increased labetalol 200q8. MRSA negative, dc'd vanc. ETT pulled back 2cm x 2, good positioning after confirmatory chest xray. Ethics attempting to establish HCP with family. Na 159, starting FW 250q6 for range 150-155.   10/3: BD3, GEORGE o/n, neuro stable. Na elevating, FW increased to 300q6. Dc'd bowel reg for diarrhea. vEEG started. SQH 5000q8 tonight.   10/4: BD 4, albumn bolus, incr. LR to 80 2/2 incr. in Cr, LR to 100cc/hr for uptrending Cr. Started 7% hypersal for 48hrs and SL atropine for inline/oral thick secretions. Dc'd CTX and started ancef for MSSA in the sputum. Nephrology consulted for CKD, f/u recs. SBP 170s, given hydralazine 10mg IVP.   10/5: BD5, o/n 10mg IVP hydralazine given for SBP 170s and started on hydralazine 25q8 via OGT. 10mg IV push labetalol for SBP > 160s. RT placed for diarrhea.   10/6: BD6, o/n FW increased to 350q4 per nephrology recs. IV tylenol for temp 100.6, SBp 160s presumed uncomfortable.   10/7: BD7, overnight pancultured for temp 101.8F.   10/8: BD8. GEORGE. Cr bumped. decreased LR to 75cc/hr. Adding simethicone ATC. incr hydralazine 50mgTID. Incr labetalol 300mgTID. Na 145, decreased FWF to 250q6. Start precedex. FENa consistent with intrinsic kidney injury. Pend repeat renal US. Retaining up to 1.3L, bladder scans q6, straight cath PRN  10/9: BD 9. GEORGE overnight. Neuro stable. abd xray for distention w non-specific gas pattern, OGT to LIWS for morning. duonebs/mucomyst to q8 for improving secretions. Changed tube feeds to Jevity 1.5 20cc/hr, low rate due to abdominal distention, nepro dense and more difficult to digest. Tolerating CPAP, confirmed by ABG.   10/10: BD 10. GEORGE overnight. Neuro stable. (+) gabriel for urinary retention on bladder scan. inc TF to goal rate of 40cc/hr. family leaning toward pursuing trach/PEG. 1/2 amp for FS 81.   10/11: BD 11. GEORGE overnight. Neuro stable. Trach/PEG consults placed.   10/12: BD 12. GEORGE overnight. Neuro stable. MRI brain complete.   10/13: BD 13. Increase flomax. Hold SQH after PM dose for trach tm. IVL.   10/14: BD 14. GEORGE overnight, remains on AC/VC. Gabriel placed for urinary retention. Dc'd free water.  S/p trach with pulm. NGT placed and CXR confirmed in good position.   10/15: BD 15, GEORGE ovn. resumed feeds. spiked 101, pan cx sent.   10/16: BD 16. GEORGE ovn. Lokelma 5mg for K+ 5.4. Started vanc q 24/zosyn for empiric PNA coverage, IVF to 100/hr. PEG held for fever.   10/17: BD 17,  ordered serum osm and urine osm for am. Started sinemet for neurostimulation. Increased cardura to 0.8. Started FW 100q4, dc'd IVF. MRSA negative, dc'd vanc. NGT replaced d/t coiling.   10/18: BD 18, GEORGE overnight, neuro stable. Amantadine added for neurostim. zosyn changed to unasyn for acinetobacter baumannii, failed TOV and required SC  10/19: BD 19, GEORGE ovn. cardura 2mg added for retention. labetalol decreased 200q8, hydralazine decreased 25q8. Gabriel replaced.   10/20: BD20, GEORGE overnight. NGT dislodged, replaced. PEG tomorrow w/ gen surg, FW increased to 150q4 and labetalol decreased to 100q8, lokelma given for hyperkalemia.   10/21: BD 21. POD0 PEG placement with Gen surg. decr labetolol to 50q8, incr. cardura to 0.4, started lokelma and phoslo, dc gabriel POD0 PEG placement with Gen surg.  10/22: BD 22. Plan to start TF today via PEG. dc labetalol, Following ophtho recs. Increased apnea settings - found to be in cheyne-moe respiration. CPAP 5/5.  10/23: BD 23. hydralazine d/c'd, trach collar trial today. Rectal tube placed at 6am.  10/24: BD24, o/n lokelma held due to diarrhea. Free water 100q6 resumed. dc'd tamsulosin, amantadine. Incr'd cardura to 8mg qhs. Dc'd FW. Switched jevity to nepro. gabriel placed for high urine output. Started SL atropine for oral secretions. Dc'd free water.  10/25: BD25, o/n decreased suctioning requirements to > q4hrs, GEORGE. Cr improving, cont phoslo, lokelma held at this time. Gabriel placed yest, cont. Tolerating trach collar. Given 500cc plasmalyte bolus for ANIKA. Dc'd sinemet.   10/26: BD26, o/n resumed lokelma 5mg daily and resumed 100cc free water q6hrs. Change in neuro status with new right pupillary dilation with anisocoria (right pupil 6mm fixed and left pupil 3mm briskly reactive). Given 23.4% NaCl bullet, taken for emergent CTH showing mostly resolved pontine hemorrhage, continued brainstem hypodensity likely edema d/t hemorrhage, no new hemorrhage or infarct, no herniation, mild increase in size of left lateral ventricle. Vitals remaine stable. Na goal > 140.   10/27: BD27, o/n GEORGE.Neuro stable. Pend stepdown with airway bed.   10/28: BD 28. GEORGE overnight. Neuro stable. Miralax ordered. Gabriel removed, pending TOV.  10/29: BD 29. GEORGE o/n. Given 2L NS over 8 hrs for increased BUN/Cr ratio. Gabriel placed for frequent straight cath.   10/30: BD 30.   10/31: BD 31. GEORGE overnight. Na 149, increased free water to 200q6. 1L NS for uptrending BUN.   11/1: BD 32. GEORGE overnight. Given 1L NS for dehydration. Na 146, increased FW to 250q6.   11/2: BD 33 GEORGE overnight, neuro stable, given 500cc bolus for net negative status and tachycardia   11/3: BD 34, GEORGE overnight, neuro stable. Patient remains tachycardic, EKG showing sinus tachycardia, given additional 500cc NS bolus. Febrile to 101.9F, pan cultured (without UA), CXR WNL, given tylenol.   11/4: BD 35, GEORGE overnight, neuro stable. Given 1L NS for tachycardia. sputum (+) for stenotropohomas maltophilia.   11/5: BD 36 GEORGE overnight, neuro stable. Vancomycin dc'd. Chest PT BID. ID consulted, cont zosyn.  11/6: BD 37. blood cx + klebsiella dc zosyn changed to cefepime, CTAP ordered, rpt blood cx sent.    11/7: BD 38. Pending CT A/P, given 250cc bolus and starting maintenance fluids overnight. Pending CT A/P after bolus   11/8: BD 39. CT CAP negative for infection.   11/9: BD 40. GEORGE overnight.  11/10: BD 41. GEORGE overnight. desat to 85 on trach collar, O2 inc to 10L and 100%, O2 sat inc to 95. pt tachy to 110s, euvolemic. given tylenol. ABG and CXR ordered. spiked fever, pancultured, RVP negative. AM ABG w pO2 79, rpt w pO2 79. pt appears comfortable, satting 94%.   11/11: BD 42. GEORGE overnight. pt became tachy to 130s, desat to 90 on 100% FiO2 and 10L. suctioned, (+) productive cough. temp 101.4, given 1g IV tylenol and 500cc NS bolus for euvolemia. fever and HR downtrending. LE dopplers negative for dvt  11/12: BD 43, GEORGE ovn, fever and HR downtrending, satting 97% 70% FIO2  11/13: BD 44, GEORGE ovn. started standing tylenol x24 hours for tachycardia. desat to 80s, o2 increased. CXR stable, pending CTA PE protocol.   11/14: BD 45, GEORGE overnight, neuro stable. resp therapy dec FiO2 to 70%.   11/15: BD 46, GEORGE overnight, neuro stable.  Rapid called for desaturation 30s, tachycardic 140s. Patient bagged, 100% fio2, heavily suctioned. CXR/POCUS unremarkable. ABG c/w desaturation. WBC 14.71. Afebrile. O2 improved to 90s and patient upgraded to ICU. ABG paO2 30s improved to 89 on vent. IV Tylenol x 1, sputum sent. Start protonix while o-n vent.   11/16: BD 47. POCUS showed collapsable IVCF, given 1L bolus. Vanco/Cefpime added empriic for PNA, NGT feeds restarted. MRSA swab neg, Vanc DC'd.   11/17: BD 48. GEORGE overnight. 1l bolus for tachycardia. Spiked to 101, cultured. 500cc bolus for tachycardia, tachy to 148 given 25mcg fentanyl, 250cc albumin, 1.5L bolus. 5 IV lopressor with response HR to 100s. +Stenotrophomonas on sputum cx.   11/18: BD 49. GEORGE overnight. Consulted ID, cefepime switched to bactrim x7days. Started hydrochlorothiazine 12.5mg daily.  11/19: BD 50. Tachy 120s, given tylenol and 500cc NS. Tolerating 5/5, switched to TCx. Holding phos binder. D/c Bactrim. D/c gabriel, f/u TOV. Dc'd PPI.   11/20: BD 51. GEORGE ovn. 1600 satting low 90s, mildly tachy to 110s, afebrile, RR wnl. O2 improved to mid 90s while inc O2 to 100% on TCx. CPAP 5/5 placed back on.  11/21: BD 52, GEORGE ovn, tolerating CPAP 5/5. Switch to trach collar during the day if tolerating well. HCTZ held for Cr bump, straight cath frequence increased to q4  11/22: BD 53, GEORGE ovn. Resumed phoslo. Gabriel placed. Resumed HCTZ.   11/23: BD 54. Holding tylenol in setting of possible fever, will require pan cx if febrile. Cr improved today. Cont CPAP. Bowel regimen held i/s/o diarrhea. FOBT negative.  11/24: BD 55. GEORGE overnight. Neuro stable. HCTZ dc'ed, started lisinopril 5. Lokelma dc'ed for K 3.7.   11/25: BD 56, GEORGE overnight. Neuro stable. dc'd lisinopril 5mg. Gabriel dc'd. TOV. 1545 noted to be hypotensive, MAP 50, in supine position on chair, HR 60s, afebrile, O2 96%. Given 1L cc NS bolus, placed back on bed in reverse trendelenberg, improved to map of 66. Neostick at bedside. Vitals check q1h. Dc'ed amlodipine. Failed TOV, bladder scan q6, sc prn. Added back Senna.   11/26: BD 57, GEORGE overnight. Neuro stable. Dc'd phoslo.   11/27: BD 58, GEORGE overnight. Neuro stable.    11/28: BD 59. Gabriel replaced.   11/29: GEORGE.  11/30: GEORGE, neuro stable.   12/1: BD 62, GEORGE overnight  12/2: BD 63, GEORGE overnight.; Given 1L bolus of LR for uptrending BUN/Cr.  12/3: BD 64. Reinstated eye gtt/moisture chamber given increased Rt eye injection  12/4: BD 65. GEORGE overnight. Attempted to speak with ophtho regarding eyelid weight/closure but no answer, full mailbox.   12/5: BD 66, GEORGE overnight, bowel regimen increased      Vital Signs Last 24 Hrs  T(C): 37.7 (04 Dec 2024 22:17), Max: 37.7 (04 Dec 2024 22:17)  T(F): 99.9 (04 Dec 2024 22:17), Max: 99.9 (04 Dec 2024 22:17)  HR: 86 (05 Dec 2024 00:55) (70 - 92)  BP: 120/84 (05 Dec 2024 00:55) (119/94 - 130/96)  BP(mean): 96 (05 Dec 2024 00:55) (96 - 108)  RR: 16 (05 Dec 2024 00:55) (16 - 18)  SpO2: 94% (05 Dec 2024 00:55) (92% - 100%)    Parameters below as of 05 Dec 2024 00:55  Patient On (Oxygen Delivery Method): tracheostomy collar  O2 Flow (L/min): 10  O2 Concentration (%): 40    I&O's Summary    03 Dec 2024 07:01  -  04 Dec 2024 07:00  --------------------------------------------------------  IN: 1790 mL / OUT: 1375 mL / NET: 415 mL    04 Dec 2024 07:01  -  05 Dec 2024 01:40  --------------------------------------------------------  IN: 370 mL / OUT: 525 mL / NET: -155 mL        PHYSICAL EXAM:  General: patient seen laying supine in bed in NAD  Neuro: OEs to noxious, A&Ox0, does not follow commands, L hand spontanous movement in fingers, RLE withdraws to noxious, LLE  withdrawal to noxious, LUE 0/5  HEENT: PERRL, only tracks vertically, +trach collar  Neck: supple  Cardiac: RRR, S1S2  Pulmonary: chest rise symmetric  Abdomen: soft, nontender, nondistended, +PEG  Ext: perfusing well  Skin: warm, dry    LABS:                        10.9   7.46  )-----------( 204      ( 03 Dec 2024 05:30 )             33.9     12-03    137  |  101  |  60[H]  ----------------------------<  120[H]  3.5   |  23  |  1.22    Ca    9.2      03 Dec 2024 05:30  Phos  3.5     12-03  Mg     2.1     12-03        Urinalysis Basic - ( 03 Dec 2024 05:30 )    Color: x / Appearance: x / SG: x / pH: x  Gluc: 120 mg/dL / Ketone: x  / Bili: x / Urobili: x   Blood: x / Protein: x / Nitrite: x   Leuk Esterase: x / RBC: x / WBC x   Sq Epi: x / Non Sq Epi: x / Bacteria: x          CAPILLARY BLOOD GLUCOSE          Drug Levels: [] N/A    CSF Analysis: [] N/A      Allergies    Allergy Status Unknown    Intolerances      MEDICATIONS:  Antibiotics:    Neuro:  acetaminophen   Oral Liquid .. 650 milliGRAM(s) Oral every 6 hours PRN    Anticoagulation:  heparin   Injectable 5000 Unit(s) SubCutaneous every 8 hours    OTHER:  acetylcysteine 10%  Inhalation 4 milliLiter(s) Inhalation every 8 hours  albuterol/ipratropium for Nebulization 3 milliLiter(s) Nebulizer every 8 hours  artificial tears (preservative free) Ophthalmic Solution 1 Drop(s) Right EYE every 4 hours  chlorhexidine 2% Cloths 1 Application(s) Topical <User Schedule>  doxazosin 8 milliGRAM(s) Oral at bedtime  erythromycin   Ointment 1 Application(s) Right EYE at bedtime  ofloxacin 0.3% Solution 1 Drop(s) Right EYE four times a day  petrolatum Ophthalmic Ointment 1 Application(s) Both EYES two times a day  polyethylene glycol 3350 17 Gram(s) Oral daily  senna 2 Tablet(s) Oral at bedtime    IVF:    CULTURES:  Culture Results:   Mixed gram negative rods including Moderate Stenotrophomonas maltophilia  Commensal iram consistent with body site (11-25 @ 00:19)    RADIOLOGY & ADDITIONAL TESTS:    AMS    Handoff    MEWS Score    Acute myocardial infarction    CVA (cerebral vascular accident)    Intracerebral hemorrhage of brain stem    Brainstem stroke    Brain stem stroke syndrome    Brain stem hemorrhage    Brain stem stroke syndrome    Hemorrhagic stroke    Brainstem stroke    Encephalopathy acute    Functional quadriplegia    Advanced care planning/counseling discussion    Encounter for palliative care    Pontine hemorrhage    Neurogenic dysphagia    Chronic respiratory failure    Acute kidney injury superimposed on CKD    Acute urinary retention    Hypertensive emergency    Sepsis, unspecified organism    Sepsis    Gram-negative bacteremia    Percutaneous tracheal puncture    Altered mental status examination    EGD, with PEG    AMS    [email or web address]        ASSESSMENT:  43 y/o PMH ?stroke/MI present to [facility] after collapsing at work. Decorticate posturing, vomiting, intubated for airway protection. 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 Transferred to [facility] for further management. s/p trach 10/14. s/p peg 10/21. Re-upgrade to ICU 2/2 desaturation event and suctioning requirements 11/15.     PLAN:  Neuro:  - neuro/vitals q4h, protected sleep time 10PM-5AM  - pain control: tylenol prn  - vEEG (10/3-4)- negative, (10/17-10/19) - negative  - CTH 9/30: enlarged pontine hemorrhage, CTH 10/3: stable, CTH 10/25: mostly resolved pontine hemorrhage  - MRI brain 10/12: parenchymal hemorrhage, acute/subacute R cerebellar stroke    - stroke core measures, stroke neuro signed off    CV:  - -160  - HTN: hold amlodipine 10mg, hold lisinopril 5mg   - echo (9/30) EF 75%    Resp:  - trach collar   - Secretions: duonebs/mucomyst/chest PT q8h   - CTA chest 11/13 negative for PE, inc ground glass opacities    GI:  - TF via PEG (placed 10/21 by gen surg)  - bowel regimen, last BM 12/1  - CTAP 11/8 negative for infection    - FOBT negative 11/23    Renal:  - IVL  - FW flushes 250cc q6hrs for uptrending BUN/Cr  - Urinary retenion: Gabriel replaced 11/28  - CKD: trend BUN/Cr  - renal US 10/1: echogenicity c/w chronic med renal dz, repeat 10/8: inc renal echogeicity, c/f medical renal disease w increased hydro     Endo:  - A1c 5.4    Heme:  - DVT ppx: SCDs, SQH 5000u q8h   - LE dopplers negative 11/11    ID:  - febrile, last pancx 11/16, MRSA swab neg 11/16   -  s/p Stenotrophomonas maltophilia PNA: s/p Bactrim (11/18-11/19) s/p Cefepime (11/16-11/18)  - 11/3, (+) sputum for stenotrophomas maltophlia, blood cx (+) klebsiella, cefepime 2gq12 (11/6 - 11/12)   - empiric tx: s/p zosyn (11/3-11/6) , s/p vanc  (11/3- 11/5)  - S/p Ancef (10/4-10/14) for PNA, and s/p Unasyn (10/18-10/23) +actinobacter baumanii     MISC:  - ophtho consult for keratitis  - erythromycin ointment to rt eye q4hrs, ofloxacin ointment to rt eye QID, artificial tears to rt eye q2hrs, moisture chamber at bedtime    Dispo: tele status, full code, pending placement and guardianship hearing     D/w Dr. D'Amico     Assessment:  Present when checked    []  GCS  E   V  M     Heart Failure: []Acute, [] acute on chronic , []chronic  Heart Failure:  [] Diastolic (HFpEF), [] Systolic (HFrEF), []Combined (HFpEF and HFrEF), [] RHF, [] Pulm HTN, [] Other    [] ANIKA, [] ATN, [] AIN, [] other  [] CKD1, [] CKD2, [] CKD 3, [] CKD 4, [] CKD 5, []ESRD    Encephalopathy: [] Metabolic, [] Hepatic, [] toxic, [] Neurological, [] Other    Abnormal Nurtitional Status: [] malnurtition (see nutrition note), [ ]underweight: BMI < 19, [] morbid obesity: BMI >40, [] Cachexia    [] Sepsis  [] hypovolemic shock,[] cardiogenic shock, [] hemorrhagic shock, [] neuogenic shock  [] Acute Respiratory Failure  []Cerebral edema, [] Brain compression/ herniation,   [] Functional quadriplegia  [] Acute blood loss anemia   HPI:  Unknown age male, unknown past medical history (reported stroke and MI by coworkers) presented to [facility] with AMS, Pt was working at [employer] when he was found down by coworkers. EMS called and pt brought to [facility] ED. Intubated, sedated, started on cardene for SBPs in 200s. CT head showed brain stem bleed. Transferred to NSICU for further management.  (30 Sep 2024 12:55)      Subjective:  GEORGE overnight. Bowel regimen increased.     Hospital course:  9/30: transferred from [facility]. A line placed. Versed dc'd. Cy Rader at bedside, states pt has family in [place], cannot confirm medications or PMH other than stroke and MI. 250cc bolus 3% given. LR switched to NS. hydralazine 25q8 started, 3% started, switched propofol to precedex   10/1: stability CTH done. Added labetalol, started TF. Palliative consulted. ethics consulted to determine surrogate. febrile 103, pan cx sent  10/2: BD 2, GEORGE overnight. TF resumed. Desatt'd to 80s, FiO2 inc. to 50. Fentanyl given, ABG, CXR ordered. Maxxed on precedex, started on propofol for DARIEN -4 - -5. Precedex dc'd. Duonebs, mucomyst, hypertonic added. 3% dc'd. Cardene dc'd. Start vanc/CTX. Increased labetalol 200q8. MRSA negative, dc'd vanc. ETT pulled back 2cm x 2, good positioning after confirmatory chest xray. Ethics attempting to establish HCP with family. Na 159, starting FW 250q6 for range 150-155.   10/3: BD3, GEORGE o/n, neuro stable. Na elevating, FW increased to 300q6. Dc'd bowel reg for diarrhea. vEEG started. SQH 5000q8 tonight.   10/4: BD 4, albumn bolus, incr. LR to 80 2/2 incr. in Cr, LR to 100cc/hr for uptrending Cr. Started 7% hypersal for 48hrs and SL atropine for inline/oral thick secretions. Dc'd CTX and started ancef for MSSA in the sputum. Nephrology consulted for CKD, f/u recs. SBP 170s, given hydralazine 10mg IVP.   10/5: BD5, o/n 10mg IVP hydralazine given for SBP 170s and started on hydralazine 25q8 via OGT. 10mg IV push labetalol for SBP > 160s. RT placed for diarrhea.   10/6: BD6, o/n FW increased to 350q4 per nephrology recs. IV tylenol for temp 100.6, SBp 160s presumed uncomfortable.   10/7: BD7, overnight pancultured for temp 101.8F.   10/8: BD8. GEORGE. Cr bumped. decreased LR to 75cc/hr. Adding simethicone ATC. incr hydralazine 50mgTID. Incr labetalol 300mgTID. Na 145, decreased FWF to 250q6. Start precedex. FENa consistent with intrinsic kidney injury. Pend repeat renal US. Retaining up to 1.3L, bladder scans q6, straight cath PRN  10/9: BD 9. GEORGE overnight. Neuro stable. abd xray for distention w non-specific gas pattern, OGT to LIWS for morning. duonebs/mucomyst to q8 for improving secretions. Changed tube feeds to Jevity 1.5 20cc/hr, low rate due to abdominal distention, nepro dense and more difficult to digest. Tolerating CPAP, confirmed by ABG.   10/10: BD 10. GEORGE overnight. Neuro stable. (+) gabriel for urinary retention on bladder scan. inc TF to goal rate of 40cc/hr. family leaning toward pursuing trach/PEG. 1/2 amp for FS 81.   10/11: BD 11. GEORGE overnight. Neuro stable. Trach/PEG consults placed.   10/12: BD 12. GEORGE overnight. Neuro stable. MRI brain complete.   10/13: BD 13. Increase flomax. Hold SQH after PM dose for trach tm. IVL.   10/14: BD 14. GEORGE overnight, remains on AC/VC. Gabriel placed for urinary retention. Dc'd free water.  S/p trach with pulm. NGT placed and CXR confirmed in good position.   10/15: BD 15, GEORGE ovn. resumed feeds. spiked 101, pan cx sent.   10/16: BD 16. GEORGE ovn. Lokelma 5mg for K+ 5.4. Started vanc q 24/zosyn for empiric PNA coverage, IVF to 100/hr. PEG held for fever.   10/17: BD 17,  ordered serum osm and urine osm for am. Started sinemet for neurostimulation. Increased cardura to 0.8. Started FW 100q4, dc'd IVF. MRSA negative, dc'd vanc. NGT replaced d/t coiling.   10/18: BD 18, GEORGE overnight, neuro stable. Amantadine added for neurostim. zosyn changed to unasyn for acinetobacter baumannii, failed TOV and required SC  10/19: BD 19, GEORGE ovn. cardura 2mg added for retention. labetalol decreased 200q8, hydralazine decreased 25q8. Gabriel replaced.   10/20: BD20, GEORGE overnight. NGT dislodged, replaced. PEG tomorrow w/ gen surg, FW increased to 150q4 and labetalol decreased to 100q8, lokelma given for hyperkalemia.   10/21: BD 21. POD0 PEG placement with Gen surg. decr labetolol to 50q8, incr. cardura to 0.4, started lokelma and phoslo, dc gabriel POD0 PEG placement with Gen surg.  10/22: BD 22. Plan to start TF today via PEG. dc labetalol, Following ophtho recs. Increased apnea settings - found to be in cheyne-moe respiration. CPAP 5/5.  10/23: BD 23. hydralazine d/c'd, trach collar trial today. Rectal tube placed at 6am.  10/24: BD24, o/n lokelma held due to diarrhea. Free water 100q6 resumed. dc'd tamsulosin, amantadine. Incr'd cardura to 8mg qhs. Dc'd FW. Switched jevity to nepro. gabriel placed for high urine output. Started SL atropine for oral secretions. Dc'd free water.  10/25: BD25, o/n decreased suctioning requirements to > q4hrs, GEORGE. Cr improving, cont phoslo, lokelma held at this time. Gabriel placed yest, cont. Tolerating trach collar. Given 500cc plasmalyte bolus for ANIKA. Dc'd sinemet.   10/26: BD26, o/n resumed lokelma 5mg daily and resumed 100cc free water q6hrs. Change in neuro status with new right pupillary dilation with anisocoria (right pupil 6mm fixed and left pupil 3mm briskly reactive). Given 23.4% NaCl bullet, taken for emergent CTH showing mostly resolved pontine hemorrhage, continued brainstem hypodensity likely edema d/t hemorrhage, no new hemorrhage or infarct, no herniation, mild increase in size of left lateral ventricle. Vitals remaine stable. Na goal > 140.   10/27: BD27, o/n GEORGE.Neuro stable. Pend stepdown with airway bed.   10/28: BD 28. GEORGE overnight. Neuro stable. Miralax ordered. Gabriel removed, pending TOV.  10/29: BD 29. GEORGE o/n. Given 2L NS over 8 hrs for increased BUN/Cr ratio. Gabriel placed for frequent straight cath.   10/30: BD 30.   10/31: BD 31. GEORGE overnight. Na 149, increased free water to 200q6. 1L NS for uptrending BUN.   11/1: BD 32. GEORGE overnight. Given 1L NS for dehydration. Na 146, increased FW to 250q6.   11/2: BD 33 GEORGE overnight, neuro stable, given 500cc bolus for net negative status and tachycardia   11/3: BD 34, GEORGE overnight, neuro stable. Patient remains tachycardic, EKG showing sinus tachycardia, given additional 500cc NS bolus. Febrile to 101.9F, pan cultured (without UA), CXR WNL, given tylenol.   11/4: BD 35, GEORGE overnight, neuro stable. Given 1L NS for tachycardia. sputum (+) for stenotropohomas maltophilia.   11/5: BD 36 GEORGE overnight, neuro stable. Vancomycin dc'd. Chest PT BID. ID consulted, cont zosyn.  11/6: BD 37. blood cx + klebsiella dc zosyn changed to cefepime, CTAP ordered, rpt blood cx sent.    11/7: BD 38. Pending CT A/P, given 250cc bolus and starting maintenance fluids overnight. Pending CT A/P after bolus   11/8: BD 39. CT CAP negative for infection.   11/9: BD 40. GEORGE overnight.  11/10: BD 41. GEORGE overnight. desat to 85 on trach collar, O2 inc to 10L and 100%, O2 sat inc to 95. pt tachy to 110s, euvolemic. given tylenol. ABG and CXR ordered. spiked fever, pancultured, RVP negative. AM ABG w pO2 79, rpt w pO2 79. pt appears comfortable, satting 94%.   11/11: BD 42. GEORGE overnight. pt became tachy to 130s, desat to 90 on 100% FiO2 and 10L. suctioned, (+) productive cough. temp 101.4, given 1g IV tylenol and 500cc NS bolus for euvolemia. fever and HR downtrending. LE dopplers negative for dvt  11/12: BD 43, GEORGE ovn, fever and HR downtrending, satting 97% 70% FIO2  11/13: BD 44, GEORGE ovn. started standing tylenol x24 hours for tachycardia. desat to 80s, o2 increased. CXR stable, pending CTA PE protocol.   11/14: BD 45, GEORGE overnight, neuro stable. resp therapy dec FiO2 to 70%.   11/15: BD 46, GEORGE overnight, neuro stable.  Rapid called for desaturation 30s, tachycardic 140s. Patient bagged, 100% fio2, heavily suctioned. CXR/POCUS unremarkable. ABG c/w desaturation. WBC 14.71. Afebrile. O2 improved to 90s and patient upgraded to ICU. ABG paO2 30s improved to 89 on vent. IV Tylenol x 1, sputum sent. Start protonix while o-n vent.   11/16: BD 47. POCUS showed collapsable IVCF, given 1L bolus. Vanco/Cefpime added empriic for PNA, NGT feeds restarted. MRSA swab neg, Vanc DC'd.   11/17: BD 48. GEORGE overnight. 1l bolus for tachycardia. Spiked to 101, cultured. 500cc bolus for tachycardia, tachy to 148 given 25mcg fentanyl, 250cc albumin, 1.5L bolus. 5 IV lopressor with response HR to 100s. +Stenotrophomonas on sputum cx.   11/18: BD 49. GEORGE overnight. Consulted ID, cefepime switched to bactrim x7days. Started hydrochlorothiazine 12.5mg daily.  11/19: BD 50. Tachy 120s, given tylenol and 500cc NS. Tolerating 5/5, switched to TCx. Holding phos binder. D/c Bactrim. D/c gabriel, f/u TOV. Dc'd PPI.   11/20: BD 51. GEORGE ovn. 1600 satting low 90s, mildly tachy to 110s, afebrile, RR wnl. O2 improved to mid 90s while inc O2 to 100% on TCx. CPAP 5/5 placed back on.  11/21: BD 52, GEORGE ovn, tolerating CPAP 5/5. Switch to trach collar during the day if tolerating well. HCTZ held for Cr bump, straight cath frequence increased to q4  11/22: BD 53, GEORGE ovn. Resumed phoslo. Gabriel placed. Resumed HCTZ.   11/23: BD 54. Holding tylenol in setting of possible fever, will require pan cx if febrile. Cr improved today. Cont CPAP. Bowel regimen held i/s/o diarrhea. FOBT negative.  11/24: BD 55. GEORGE overnight. Neuro stable. HCTZ dc'ed, started lisinopril 5. Lokelma dc'ed for K 3.7.   11/25: BD 56, GEORGE overnight. Neuro stable. dc'd lisinopril 5mg. Gabriel dc'd. TOV. 1545 noted to be hypotensive, MAP 50, in supine position on chair, HR 60s, afebrile, O2 96%. Given 1L cc NS bolus, placed back on bed in reverse trendelenberg, improved to map of 66. Neostick at bedside. Vitals check q1h. Dc'ed amlodipine. Failed TOV, bladder scan q6, sc prn. Added back Senna.   11/26: BD 57, GEORGE overnight. Neuro stable. Dc'd phoslo.   11/27: BD 58, GEORGE overnight. Neuro stable.    11/28: BD 59. Gabriel replaced.   11/29: GEORGE.  11/30: GEORGE, neuro stable.   12/1: BD 62, GEORGE overnight  12/2: BD 63, GEORGE overnight.; Given 1L bolus of LR for uptrending BUN/Cr.  12/3: BD 64. Reinstated eye gtt/moisture chamber given increased Rt eye injection  12/4: BD 65. GEORGE overnight. Attempted to speak with ophtho regarding eyelid weight/closure but no answer, full mailbox.   12/5: BD 66, GEORGE overnight, bowel regimen increased      Vital Signs Last 24 Hrs  T(C): 37.7 (04 Dec 2024 22:17), Max: 37.7 (04 Dec 2024 22:17)  T(F): 99.9 (04 Dec 2024 22:17), Max: 99.9 (04 Dec 2024 22:17)  HR: 86 (05 Dec 2024 00:55) (70 - 92)  BP: 120/84 (05 Dec 2024 00:55) (119/94 - 130/96)  BP(mean): 96 (05 Dec 2024 00:55) (96 - 108)  RR: 16 (05 Dec 2024 00:55) (16 - 18)  SpO2: 94% (05 Dec 2024 00:55) (92% - 100%)    Parameters below as of 05 Dec 2024 00:55  Patient On (Oxygen Delivery Method): tracheostomy collar  O2 Flow (L/min): 10  O2 Concentration (%): 40    I&O's Summary    03 Dec 2024 07:01  -  04 Dec 2024 07:00  --------------------------------------------------------  IN: 1790 mL / OUT: 1375 mL / NET: 415 mL    04 Dec 2024 07:01  -  05 Dec 2024 01:40  --------------------------------------------------------  IN: 370 mL / OUT: 525 mL / NET: -155 mL        PHYSICAL EXAM:  General: patient seen laying supine in bed in NAD  Neuro: OEs to noxious, A&Ox0, does not follow commands, R hand spontanous movement in fingers, RLE withdraws to noxious, LLE  withdrawal to noxious, LUE 0/5  HEENT: PERRL, only tracks vertically, +trach collar  Neck: supple  Cardiac: RRR, S1S2  Pulmonary: chest rise symmetric  Abdomen: soft, nontender, nondistended, +PEG  Ext: perfusing well  Skin: warm, dry    LABS:                        10.9   7.46  )-----------( 204      ( 03 Dec 2024 05:30 )             33.9     12-03    137  |  101  |  60[H]  ----------------------------<  120[H]  3.5   |  23  |  1.22    Ca    9.2      03 Dec 2024 05:30  Phos  3.5     12-03  Mg     2.1     12-03        Urinalysis Basic - ( 03 Dec 2024 05:30 )    Color: x / Appearance: x / SG: x / pH: x  Gluc: 120 mg/dL / Ketone: x  / Bili: x / Urobili: x   Blood: x / Protein: x / Nitrite: x   Leuk Esterase: x / RBC: x / WBC x   Sq Epi: x / Non Sq Epi: x / Bacteria: x          CAPILLARY BLOOD GLUCOSE          Drug Levels: [] N/A    CSF Analysis: [] N/A      Allergies    Allergy Status Unknown    Intolerances      MEDICATIONS:  Antibiotics:    Neuro:  acetaminophen   Oral Liquid .. 650 milliGRAM(s) Oral every 6 hours PRN    Anticoagulation:  heparin   Injectable 5000 Unit(s) SubCutaneous every 8 hours    OTHER:  acetylcysteine 10%  Inhalation 4 milliLiter(s) Inhalation every 8 hours  albuterol/ipratropium for Nebulization 3 milliLiter(s) Nebulizer every 8 hours  artificial tears (preservative free) Ophthalmic Solution 1 Drop(s) Right EYE every 4 hours  chlorhexidine 2% Cloths 1 Application(s) Topical <User Schedule>  doxazosin 8 milliGRAM(s) Oral at bedtime  erythromycin   Ointment 1 Application(s) Right EYE at bedtime  ofloxacin 0.3% Solution 1 Drop(s) Right EYE four times a day  petrolatum Ophthalmic Ointment 1 Application(s) Both EYES two times a day  polyethylene glycol 3350 17 Gram(s) Oral daily  senna 2 Tablet(s) Oral at bedtime    IVF:    CULTURES:  Culture Results:   Mixed gram negative rods including Moderate Stenotrophomonas maltophilia  Commensal iram consistent with body site (11-25 @ 00:19)    RADIOLOGY & ADDITIONAL TESTS:    AMS    Handoff    MEWS Score    Acute myocardial infarction    CVA (cerebral vascular accident)    Intracerebral hemorrhage of brain stem    Brainstem stroke    Brain stem stroke syndrome    Brain stem hemorrhage    Brain stem stroke syndrome    Hemorrhagic stroke    Brainstem stroke    Encephalopathy acute    Functional quadriplegia    Advanced care planning/counseling discussion    Encounter for palliative care    Pontine hemorrhage    Neurogenic dysphagia    Chronic respiratory failure    Acute kidney injury superimposed on CKD    Acute urinary retention    Hypertensive emergency    Sepsis, unspecified organism    Sepsis    Gram-negative bacteremia    Percutaneous tracheal puncture    Altered mental status examination    EGD, with PEG    AMS    [email or web address]        ASSESSMENT:  43 y/o PMH ?stroke/MI present to [facility] after collapsing at work. Decorticate posturing, vomiting, intubated for airway protection. 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 Transferred to [facility] for further management. s/p trach 10/14. s/p peg 10/21. Re-upgrade to ICU 2/2 desaturation event and suctioning requirements 11/15.     PLAN:  Neuro:  - neuro/vitals q4h, protected sleep time 10PM-5AM  - pain control: tylenol prn  - vEEG (10/3-4)- negative, (10/17-10/19) - negative  - CTH 9/30: enlarged pontine hemorrhage, CTH 10/3: stable, CTH 10/25: mostly resolved pontine hemorrhage  - MRI brain 10/12: parenchymal hemorrhage, acute/subacute R cerebellar stroke    - stroke core measures, stroke neuro signed off    CV:  - -160  - HTN: hold amlodipine 10mg, hold lisinopril 5mg   - echo (9/30) EF 75%    Resp:  - trach collar   - Secretions: duonebs/mucomyst/chest PT q8h   - CTA chest 11/13 negative for PE, inc ground glass opacities    GI:  - TF via PEG (placed 10/21 by gen surg)  - bowel regimen, last BM 12/1  - CTAP 11/8 negative for infection    - FOBT negative 11/23    Renal:  - IVL  - FW flushes 250cc q6hrs for uptrending BUN/Cr  - Urinary retenion: Gabriel replaced 11/28  - CKD: trend BUN/Cr  - renal US 10/1: echogenicity c/w chronic med renal dz, repeat 10/8: inc renal echogeicity, c/f medical renal disease w increased hydro     Endo:  - A1c 5.4    Heme:  - DVT ppx: SCDs, SQH 5000u q8h   - LE dopplers negative 11/11    ID:  - febrile, last pancx 11/16, MRSA swab neg 11/16   -  s/p Stenotrophomonas maltophilia PNA: s/p Bactrim (11/18-11/19) s/p Cefepime (11/16-11/18)  - 11/3, (+) sputum for stenotrophomas maltophlia, blood cx (+) klebsiella, cefepime 2gq12 (11/6 - 11/12)   - empiric tx: s/p zosyn (11/3-11/6) , s/p vanc  (11/3- 11/5)  - S/p Ancef (10/4-10/14) for PNA, and s/p Unasyn (10/18-10/23) +actinobacter baumanii     MISC:  - ophtho consult for keratitis  - erythromycin ointment to rt eye q4hrs, ofloxacin ointment to rt eye QID, artificial tears to rt eye q2hrs, moisture chamber at bedtime    Dispo: tele status, full code, pending placement and guardianship hearing     D/w Dr. D'Amico     Assessment:  Present when checked    []  GCS  E   V  M     Heart Failure: []Acute, [] acute on chronic , []chronic  Heart Failure:  [] Diastolic (HFpEF), [] Systolic (HFrEF), []Combined (HFpEF and HFrEF), [] RHF, [] Pulm HTN, [] Other    [] ANIKA, [] ATN, [] AIN, [] other  [] CKD1, [] CKD2, [] CKD 3, [] CKD 4, [] CKD 5, []ESRD    Encephalopathy: [] Metabolic, [] Hepatic, [] toxic, [] Neurological, [] Other    Abnormal Nurtitional Status: [] malnurtition (see nutrition note), [ ]underweight: BMI < 19, [] morbid obesity: BMI >40, [] Cachexia    [] Sepsis  [] hypovolemic shock,[] cardiogenic shock, [] hemorrhagic shock, [] neuogenic shock  [] Acute Respiratory Failure  []Cerebral edema, [] Brain compression/ herniation,   [] Functional quadriplegia  [] Acute blood loss anemia

## 2024-12-05 NOTE — PROGRESS NOTE ADULT - ASSESSMENT
44M with unclear PMH (?stroke, MI) who was found down at work, intubated for airway protection and found to have acute parenchymal hemorrhage within nabil with mass effect (+ acute/subacute right cerebellar infarct) in setting of hypertensive emergency, transferred to [facility] for further neurosurgical care. Hospital course c/b poor neurologic recovery s/p trach-PEG, AUR s/p gabriel, ANIKA on CKD c/b hyperkalemia, HAP s/p amp-sulbactam (EOT 10/23), K aerogenes bacteremia  treated with 2 weeks of Cefepime per ID , On 11/15 he became septic and was transferred to NSICU due to increased o2 requirements and needed Vent support , Trach Cultures + for Stenotrophomonas which was treated with 7 days of Bactrim per ID , has been weaned of Vent since 11/23 and is now on 10lts at 40% o2 through Trach Collar     # Pontine hemorrhage  # Encephalopathy due to Intracranial Bleed   - Acute parenchymal hemorrhage within nabil with mass effect (+ acute/subacute right cerebellar infarct) in setting of hypertensive emergency.   - MRI brain also demonstrated acute/subacute R cerebellar stroke.   - No Neurosurgical Interventions were performed   - due to IPH and Cerebellar stroke patient has become Trach and Peg Dependent    # Hypertension  - Currently Normotensive , not on Any Anti HTN agents , IF BP Consistently > 140/90 can restart amlodipine 5mg daily     # Acute kidney injury  # Acute Tubular Necrosis superimposed on CKD. 3  - Pt s/p US renal with chronic medical renal disease  - Elevated BUN , Not on any meds that can elevate BUN , Hgb Stable   - 1 Litre LR bolus on 12/1, Cr improved to 1.2s     # Chronic respiratory failure.   -Pt s/p percutaneous trach by pulm on 10/14/24  - Continue trach collar   - Continue routine trach care and suctioning PRN  - Chest PT      # Neurogenic dysphagia.   -Pt s/pPEG with surgery 10/21/24  - TF per nutrition  - aspiration precautions and elevation of HOB.    #Acute urinary retention.   - doxazosin 8mg  - Failure TOV on 11/25/24     # Functional quadriplegia in  setting of brainstem hemorrhage  - decub precautions  - care per nursing protocol     # DVT prop  -Continue Heparin SQ q8     # Dispo: can likely be stepped down to floors given clinical stability

## 2024-12-06 LAB
ANION GAP SERPL CALC-SCNC: 10 MMOL/L — SIGNIFICANT CHANGE UP (ref 5–17)
BUN SERPL-MCNC: 61 MG/DL — HIGH (ref 7–23)
CALCIUM SERPL-MCNC: 9.4 MG/DL — SIGNIFICANT CHANGE UP (ref 8.4–10.5)
CHLORIDE SERPL-SCNC: 106 MMOL/L — SIGNIFICANT CHANGE UP (ref 96–108)
CO2 SERPL-SCNC: 24 MMOL/L — SIGNIFICANT CHANGE UP (ref 22–31)
CREAT SERPL-MCNC: 1.34 MG/DL — HIGH (ref 0.5–1.3)
EGFR: 66 ML/MIN/1.73M2 — SIGNIFICANT CHANGE UP
EGFR: 66 ML/MIN/1.73M2 — SIGNIFICANT CHANGE UP
GLUCOSE SERPL-MCNC: 108 MG/DL — HIGH (ref 70–99)
HCT VFR BLD CALC: 35.5 % — LOW (ref 39–50)
HGB BLD-MCNC: 11.5 G/DL — LOW (ref 13–17)
MAGNESIUM SERPL-MCNC: 2.3 MG/DL — SIGNIFICANT CHANGE UP (ref 1.6–2.6)
MCHC RBC-ENTMCNC: 30.6 PG — SIGNIFICANT CHANGE UP (ref 27–34)
MCHC RBC-ENTMCNC: 32.4 G/DL — SIGNIFICANT CHANGE UP (ref 32–36)
MCV RBC AUTO: 94.4 FL — SIGNIFICANT CHANGE UP (ref 80–100)
NRBC # BLD: 0 /100 WBCS — SIGNIFICANT CHANGE UP (ref 0–0)
NRBC BLD-RTO: 0 /100 WBCS — SIGNIFICANT CHANGE UP (ref 0–0)
PHOSPHATE SERPL-MCNC: 4 MG/DL — SIGNIFICANT CHANGE UP (ref 2.5–4.5)
PLATELET # BLD AUTO: 219 K/UL — SIGNIFICANT CHANGE UP (ref 150–400)
POTASSIUM SERPL-MCNC: 4.1 MMOL/L — SIGNIFICANT CHANGE UP (ref 3.5–5.3)
POTASSIUM SERPL-SCNC: 4.1 MMOL/L — SIGNIFICANT CHANGE UP (ref 3.5–5.3)
RBC # BLD: 3.76 M/UL — LOW (ref 4.2–5.8)
RBC # FLD: 14.6 % — HIGH (ref 10.3–14.5)
SODIUM SERPL-SCNC: 140 MMOL/L — SIGNIFICANT CHANGE UP (ref 135–145)
WBC # BLD: 6.87 K/UL — SIGNIFICANT CHANGE UP (ref 3.8–10.5)
WBC # FLD AUTO: 6.87 K/UL — SIGNIFICANT CHANGE UP (ref 3.8–10.5)

## 2024-12-06 PROCEDURE — 99232 SBSQ HOSP IP/OBS MODERATE 35: CPT

## 2024-12-06 PROCEDURE — 99232 SBSQ HOSP IP/OBS MODERATE 35: CPT | Mod: GC

## 2024-12-06 RX ADMIN — HEPARIN SODIUM 5000 UNIT(S): 1000 INJECTION INTRAVENOUS; SUBCUTANEOUS at 14:02

## 2024-12-06 RX ADMIN — Medication 1 APPLICATION(S): at 05:03

## 2024-12-06 RX ADMIN — OFLOXACIN 1 DROP(S): 3 SOLUTION OPHTHALMIC at 01:01

## 2024-12-06 RX ADMIN — HEPARIN SODIUM 5000 UNIT(S): 1000 INJECTION INTRAVENOUS; SUBCUTANEOUS at 05:03

## 2024-12-06 RX ADMIN — DOXAZOSIN MESYLATE 8 MILLIGRAM(S): 8 TABLET ORAL at 22:13

## 2024-12-06 RX ADMIN — ACETYLCYSTEINE 4 MILLILITER(S): 200 INHALANT RESPIRATORY (INHALATION) at 05:03

## 2024-12-06 RX ADMIN — Medication 1 APPLICATION(S): at 18:11

## 2024-12-06 RX ADMIN — Medication 1 APPLICATION(S): at 05:04

## 2024-12-06 RX ADMIN — Medication 1 DROP(S): at 05:03

## 2024-12-06 RX ADMIN — OFLOXACIN 1 DROP(S): 3 SOLUTION OPHTHALMIC at 05:04

## 2024-12-06 RX ADMIN — Medication 1 DROP(S): at 09:52

## 2024-12-06 RX ADMIN — ACETYLCYSTEINE 4 MILLILITER(S): 200 INHALANT RESPIRATORY (INHALATION) at 22:13

## 2024-12-06 RX ADMIN — ACETYLCYSTEINE 4 MILLILITER(S): 200 INHALANT RESPIRATORY (INHALATION) at 14:02

## 2024-12-06 RX ADMIN — Medication 2 TABLET(S): at 22:13

## 2024-12-06 RX ADMIN — IPRATROPIUM BROMIDE AND ALBUTEROL SULFATE 3 MILLILITER(S): .5; 2.5 SOLUTION RESPIRATORY (INHALATION) at 22:13

## 2024-12-06 RX ADMIN — ERYTHROMYCIN 1 APPLICATION(S): 5 OINTMENT OPHTHALMIC at 22:13

## 2024-12-06 RX ADMIN — OFLOXACIN 1 DROP(S): 3 SOLUTION OPHTHALMIC at 14:07

## 2024-12-06 RX ADMIN — HEPARIN SODIUM 5000 UNIT(S): 1000 INJECTION INTRAVENOUS; SUBCUTANEOUS at 22:12

## 2024-12-06 RX ADMIN — POLYETHYLENE GLYCOL 3350 17 GRAM(S): 17 POWDER, FOR SOLUTION ORAL at 12:34

## 2024-12-06 RX ADMIN — Medication 1 DROP(S): at 01:00

## 2024-12-06 RX ADMIN — IPRATROPIUM BROMIDE AND ALBUTEROL SULFATE 3 MILLILITER(S): .5; 2.5 SOLUTION RESPIRATORY (INHALATION) at 05:03

## 2024-12-06 RX ADMIN — OFLOXACIN 1 DROP(S): 3 SOLUTION OPHTHALMIC at 18:11

## 2024-12-06 RX ADMIN — IPRATROPIUM BROMIDE AND ALBUTEROL SULFATE 3 MILLILITER(S): .5; 2.5 SOLUTION RESPIRATORY (INHALATION) at 14:02

## 2024-12-06 RX ADMIN — Medication 1 DROP(S): at 18:10

## 2024-12-06 RX ADMIN — Medication 1 DROP(S): at 14:01

## 2024-12-06 RX ADMIN — Medication 1 DROP(S): at 22:12

## 2024-12-06 NOTE — PROGRESS NOTE ADULT - ASSESSMENT
44M with unclear PMH (?stroke, MI) who was found down at work, intubated for airway protection and found to have acute parenchymal hemorrhage within nabil with mass effect (+ acute/subacute right cerebellar infarct) in setting of hypertensive emergency, transferred to [facility] for further neurosurgical care. Hospital course c/b poor neurologic recovery s/p trach-PEG, AUR s/p gabriel, ANIKA on CKD c/b hyperkalemia, HAP s/p amp-sulbactam (EOT 10/23), K aerogenes bacteremia  treated with 2 weeks of Cefepime per ID , On 11/15 he became septic and was transferred to NSICU due to increased o2 requirements and needed Vent support , Trach Cultures + for Stenotrophomonas which was treated with 7 days of Bactrim per ID , has been weaned of Vent since 11/23 and is now on 10lts at 40% o2 through Trach Collar     # Pontine hemorrhage  # Encephalopathy due to Intracranial Bleed   - Acute parenchymal hemorrhage within nabil with mass effect (+ acute/subacute right cerebellar infarct) in setting of hypertensive emergency.   - MRI brain also demonstrated acute/subacute R cerebellar stroke.   - No Neurosurgical Interventions were performed   - due to IPH and Cerebellar stroke patient has become Trach and Peg Dependent    # Hypertension  - Currently Normotensive , not on Any Anti HTN agents , IF BP Consistently > 140/90 can restart amlodipine 5mg daily     # Acute kidney injury  # Acute Tubular Necrosis superimposed on CKD. 3  - Pt s/p US renal with chronic medical renal disease  - Elevated BUN , Not on any meds that can elevate BUN , Hgb Stable   - 1 Litre LR bolus on 12/1, Cr improved to 1.2, today 1.3s within ptn baseline range, ctm     # Chronic respiratory failure.   -Pt s/p percutaneous trach by pulm on 10/14/24  - Continue trach collar   - Continue routine trach care and suctioning PRN  - Chest PT      # Neurogenic dysphagia.   -Pt s/pPEG with surgery 10/21/24  - TF per nutrition  - aspiration precautions and elevation of HOB.    #Acute urinary retention.   - doxazosin 8mg  - Failure TOV on 11/25/24     # Functional quadriplegia in  setting of brainstem hemorrhage  - decub precautions  - care per nursing protocol     # DVT prop  -Continue Heparin SQ q8     # Dispo: can likely be stepped down to floors given clinical stability

## 2024-12-06 NOTE — PROGRESS NOTE ADULT - ASSESSMENT
44 year old male with functional, ADL, cognitive, and speech impairments in the setting of brainstem hemorrhage, s/p trach and PEG.     Brainstem hemorrhage   Respiratory dysfunction s/p Trach  Dysphagia s/p PEG  HTN - amlodipine, HCTZ  Urinary retention - +gabriel    PLAN / RECOMMENDATIONS:     # Rehab / Mobilization:   - Initial therapy assessment: [X] PT  [X] OT   - Continue skilled therapy while admitted to prevent secondary complications of immobility focus on transfer training, bed mobility, progressive ambulation, and equipment evaluation.   - Educated patient and family members on post-acute rehabilitation. Discussed anticipated rehab course.  - Encouraged HOB elevation to at least 30-40 deg to prevent orthostasis   - nursing to reposition q2 turning to prevent skin breakdown given limited mobility   - Keep extremities elevated on pillows to assist with edema and positioning.   - Ensure proper positioning of neck to midline to avoid torticollis   - Therapy precautions: falls, orthostasis, hypoxia    # Bracing/Splinting:   - Z-Flow multi-podus boots for positioning/contracture prevention  - Requested left resting hand splint. Pending delivery from [facility].     # Pain Management:   - Avoid/ monitor use sedating medications that may interfere with cognitive recovery   - Current pain regimen reviewed    # Speech/ Swallow:   - Dysphagia screen [X]  - SLP consult for swallow function evaluation and treatment, evaluate for PMV  - Diet:  NPO, continuous feeds through PEG    # GI/ Bowel:  - Continue to monitor bowel patterns.   - Bowel Regimen: miralax    # / Bladder: + gabriel  - Continue to monitor bladder patterns, avoid constipation.       # DVT Prophylaxis:   - SCDs, chemoprophylaxis - heparin    # Disposition optimization:     - Disposition recommendation - Acute Inpatient Rehabilitation  - Showing some neurologic improvement and command following.   - Care coordination working on emergency medicaid     Patient has significant functional impairments and would benefit from continued rehabilitation in the ACUTE inpatient rehab setting. He has both medical and functional needs appropriate for acute inpatient rehabilitation and would benefit from comprehensive interdisciplinary care, including a physiatrist, rehabilitation nursing, PT, OT, SLT and case management/social work. We anticipate that he would be able to tolerate 3 hours of PT/OT/SLT per day for 5 to 6 days per week to focus on mobility, transfers, gait training with assistive devices, ADL training, speech, swallow, cognition, and patient education/safety.    44 year old male with functional, ADL, cognitive, and speech impairments in the setting of brainstem hemorrhage, s/p trach and PEG.     Functional quadriplegia in the setting of Brainstem hemorrhage   Respiratory dysfunction s/p Trach  Spasticity - left finger flexors  Dysphagia s/p PEG  HTN - amlodipine, HCTZ  Urinary retention - +gabriel    PLAN / RECOMMENDATIONS:     # Rehab / Mobilization:   - Initial therapy assessment: [X] PT  [X] OT   - Continue skilled therapy while admitted to prevent secondary complications of immobility focus on transfer training, bed mobility, progressive ambulation, and equipment evaluation.   - Educated patient and family members on post-acute rehabilitation. Discussed anticipated rehab course.  - Encouraged HOB elevation to at least 30-40 deg to prevent orthostasis   - nursing to reposition q2 turning to prevent skin breakdown given limited mobility   - Keep extremities elevated on pillows to assist with edema and positioning.   - Ensure proper positioning of neck to midline to avoid torticollis   - Therapy precautions: falls, orthostasis, hypoxia    # Bracing/Splinting:   - Z-Flow multi-podus boots for positioning/contracture prevention  - Continue Left resting hand splint 8am-8pm     # Pain Management:   - Avoid/ monitor use sedating medications that may interfere with cognitive recovery   - Current pain regimen reviewed    # Speech/ Swallow:   - Dysphagia screen [X]  - SLP consult for swallow function evaluation and treatment, evaluate for PMV - ok for PMV during therapy sessions  - Diet:  NPO, continuous feeds through PEG    # GI/ Bowel:  - Continue to monitor bowel patterns.   - Bowel Regimen: miralax    # / Bladder: + gabriel  - Continue to monitor bladder patterns, avoid constipation.       # DVT Prophylaxis:   - SCDs, chemoprophylaxis - heparin    # Disposition optimization:     - Disposition recommendation - Acute Inpatient Rehabilitation  - Showing some neurologic improvement and command following.   - Care coordination working on emergency medicaid     Patient has significant functional impairments and would benefit from continued rehabilitation in the ACUTE inpatient rehab setting. He has both medical and functional needs appropriate for acute inpatient rehabilitation and would benefit from comprehensive interdisciplinary care, including a physiatrist, rehabilitation nursing, PT, OT, SLT and case management/social work. We anticipate that he would be able to tolerate 3 hours of PT/OT/SLT per day for 5 to 6 days per week to focus on mobility, transfers, gait training with assistive devices, ADL training, speech, swallow, cognition, and patient education/safety.

## 2024-12-06 NOTE — PROGRESS NOTE ADULT - SUBJECTIVE AND OBJECTIVE BOX
INTERVAL COURSE / SUBJECTIVE:  No acute events overnight.     -----------------------------------------------------------------------  REVIEW OF SYSTEMS:  Constitutional - No fever,  No fatigue  Neurological -   Pain -   Bowel - 12/5  Bladder - gabriel  -----------------------------------------------------------------------  FUNCTIONAL PROGRESS:    Physical Therapy: 12/4      Cognitive/Neuro/Behavioral  Level of Consciousness: alert  Arousal Level: arouses to voice  Orientation: disoriented to;  time  Speech: unable to speak  Mood/Behavior: calm    Language Assistance  Preferred Language to Address Healthcare Preferred Language to Address Healthcare: Thai  Preferred Sign Language Preferred Sign Language: Rehab mary Allen present and able to transalte     Therapeutic Interventions      Bed-Chair Transfer Training  Transfer Training Bed-to-Chair Transfer: dependent (less than 25% patient effort)  Transfer Training Chair-to-Bed Transfer: N/T as pt left seated in chair   Bed-to-Chair Transfer Training Transfer Safety Analysis: decreased balance;  decreased step length;  decreased weight-shifting ability;  decreased strength;  impaired balance;  impaired postural control    Therapeutic Exercise  Therapeutic Exercise Detail: BUE/BLE PROM     Neuromuscular Re-education  Neuromuscular Re-education Detail: Pt demo ability to track in all directions except past midline on R, with L eye improved ability compared to R eye // Pt demo active cervical spine rotation in limited ROM and gravity eliminated position // Pt with ability to actively squeeze R hand on command // Pt able to perform active R ankle PF/DF on command // Pt able to feel noxious stimuli on RUE and RLE // Pt with trace bilateral shoulder shrug         Occupational Therapy: 12/6    Cognitive/Neuro/Behavioral  Cognitive/Neuro/Behavioral [WDL Definition: Alert; opens eyes spontaneously; arouses to voice or touch; oriented x 4; follows commands; speech spontaneous, logical; purposeful motor response; behavior appropriate to situation]: WDL except  Level of Consciousness: confused;  alert  Arousal Level: arouses to voice  Orientation: disoriented to;  time;  situation  Speech: trached  Mood/Behavior: calm    Language Assistance  Preferred Language to Address Healthcare Preferred Language to Address Healthcare: Thai  Preferred Sign Language Preferred Sign Language: OT Gaye and OT Mike able to communicate in Thai     Respiratory      Respiratory Pre/Post Treatment Assessment  O2 Delivery/Oxygen Delivery Method Patient On (Oxygen Delivery Method): tracheostomy collar    Therapeutic Interventions      Bed-Chair Transfer Training  Transfer Training Bed-to-Chair Transfer: dependent (less than 25% patient effort);  verbal cues;  nonverbal cues (demo/gestures);  2 person assist;  +3rd person for line management;  lateral transfer with hover mat  Bed-to-Chair Transfer Training Transfer Safety Analysis: decreased balance;  decreased cognition;  decreased flexibility;  decreased strength;  impaired balance;  decreased ROM;  impaired coordination;  impaired motor control;  impaired postural control;  pain    Therapeutic Exercise  Therapeutic Exercise Detail: Pt tolerated sitting in bedside chair ~35 minutes. Pt able to briskly performed R cerivcal rotation to command in 2/2 trials, pt able to perform L cervical rotation to command in 2/2 trials with increased time and effort. Pt able to initiate cervical flexion and extension however unable to perform full range or maintain head in neutral without min-modA, pt performed R cervical rotation to withdraw from passive L/R lateral flexion. Pt able to close mouth to command in 2/5 trials with increased verbal and visual cues. Pt continues to track in B eyes vertically, L eye tracks to L and R while R eye cannot cross midline. Pt with 1/5 strength in R shoulder when asked to perform shoulder shrug, pt L shoulder remains 0/5. Pt continues to perform R hand squeeze to command with increasing ability to perform finger extension however pt still unable to fully extend all fingers.     Lower Body Dressing Training  Lower Body Dressing Training Assistance: dependent (less than 25% patient effort);  verbal cues;  nonverbal cues (demo/gestures);  1 person assist;  adjust socks in sitting;  decreased flexibility;  decreased ROM;  decreased strength;  impaired balance;  impaired coordination;  impaired motor control;  impaired postural control    OT Cognitive Treatment  OT Cognitive Treatment Treatment Detail: Pt continues to utilize eyebrow raise for "yes" and eyes closed for "no" (mostly in L eye to command) per SLP rec to repsond to therapist questions. Pt grossly A&Ox2-3 (able to confirm name, year and hospital), follows 80% single step commands. Patient scored a (5/30) on the O-Log. A score of >25 is associated with normal orientation, pt max score on O-LOG /10 2/2 pt requires yes/no responses. Pt requires cues to orient to month, date, day of week, name of hospital, situation and deficits.         Speech Therapy:    -----------------------------------------------------------------------  VITALS  T(C): 36.7 (12-06-24 @ 09:04), Max: 37.4 (12-06-24 @ 04:52)  HR: 70 (12-06-24 @ 10:01) (68 - 92)  BP: 130/94 (12-06-24 @ 09:23) (122/92 - 141/99)  RR: 18 (12-06-24 @ 10:01) (16 - 18)  SpO2: 92% (12-06-24 @ 10:01) (92% - 100%)  Wt(kg): --      PHYSICAL EXAM  Constitutional - NAD, Comfortable  HEENT - NCAT  Chest - Breathing comfortably, +trach collar  Cardiovascular - Regular pulse  Abdomen - No visible abdominal distension  Extremities - No deformities of upper or lower limbs. No calf tenderness   MSK - ROM within functional limits  Neurologic Exam -                    Cognitive - Awake, follows commands inconsistently, squeezes hand on command. +vertical eye movements for yes/no questions     Communication - non-verbal     Cranial Nerves -  difficulty tracking     Motor - trace movement of right hand. No other voluntary movement at this time, although limited by fatigue   Psychiatric - Mood stable, Affect WNL       -----------------------------------------------------------------------  RECENT LABS  CBC Full  -  ( 06 Dec 2024 05:56 )  WBC Count : 6.87 K/uL  RBC Count : 3.76 M/uL  Hemoglobin : 11.5 g/dL  Hematocrit : 35.5 %  Platelet Count - Automated : 219 K/uL  Mean Cell Volume : 94.4 fl  Mean Cell Hemoglobin : 30.6 pg  Mean Cell Hemoglobin Concentration : 32.4 g/dL  Auto Neutrophil # : x  Auto Lymphocyte # : x  Auto Monocyte # : x  Auto Eosinophil # : x  Auto Basophil # : x  Auto Neutrophil % : x  Auto Lymphocyte % : x  Auto Monocyte % : x  Auto Eosinophil % : x  Auto Basophil % : x    12-06    140  |  106  |  61[H]  ----------------------------<  108[H]  4.1   |  24  |  1.34[H]    Ca    9.4      06 Dec 2024 05:56  Phos  4.0     12-06  Mg     2.3     12-06      Urinalysis Basic - ( 06 Dec 2024 05:56 )    Color: x / Appearance: x / SG: x / pH: x  Gluc: 108 mg/dL / Ketone: x  / Bili: x / Urobili: x   Blood: x / Protein: x / Nitrite: x   Leuk Esterase: x / RBC: x / WBC x   Sq Epi: x / Non Sq Epi: x / Bacteria: x        -----------------------------------------------------------------------  IMAGING      -----------------------------------------------------------------------  MEDICATIONS   acetaminophen   Oral Liquid .. 650 milliGRAM(s) every 6 hours PRN  acetylcysteine 10%  Inhalation 4 milliLiter(s) every 8 hours  albuterol/ipratropium for Nebulization 3 milliLiter(s) every 8 hours  artificial tears (preservative free) Ophthalmic Solution 1 Drop(s) every 4 hours  chlorhexidine 2% Cloths 1 Application(s) <User Schedule>  doxazosin 8 milliGRAM(s) at bedtime  erythromycin   Ointment 1 Application(s) at bedtime  heparin   Injectable 5000 Unit(s) every 8 hours  ofloxacin 0.3% Solution 1 Drop(s) four times a day  petrolatum Ophthalmic Ointment 1 Application(s) two times a day  polyethylene glycol 3350 17 Gram(s) daily  senna 2 Tablet(s) at bedtime               INTERVAL COURSE / SUBJECTIVE:  [place] language line  service #[id number]; Trish  No acute events overnight. Patient seen and evaluated at bedside, accompanied by his Son, Chauncey, who is here from [place].  Nursing staff have no issues to report.   Eschen delivered resting hand splint    -----------------------------------------------------------------------  REVIEW OF SYSTEMS:  Able to follow 80% of command. Vertical eye movements for yes/no  Constitutional - No fever,  No fatigue  Neurological - stable  Pain - denies  Bowel - 12/5  Bladder - gabriel  -----------------------------------------------------------------------  FUNCTIONAL PROGRESS:    Physical Therapy: 12/4      Cognitive/Neuro/Behavioral  Level of Consciousness: alert  Arousal Level: arouses to voice  Orientation: disoriented to;  time  Speech: unable to speak  Mood/Behavior: calm    Language Assistance  Preferred Language to Address Healthcare Preferred Language to Address Healthcare: Grenadian  Preferred Sign Language Preferred Sign Language: Rehab mary Allen present and able to transalte     Therapeutic Interventions      Bed-Chair Transfer Training  Transfer Training Bed-to-Chair Transfer: dependent (less than 25% patient effort)  Transfer Training Chair-to-Bed Transfer: N/T as pt left seated in chair   Bed-to-Chair Transfer Training Transfer Safety Analysis: decreased balance;  decreased step length;  decreased weight-shifting ability;  decreased strength;  impaired balance;  impaired postural control    Therapeutic Exercise  Therapeutic Exercise Detail: BUE/BLE PROM     Neuromuscular Re-education  Neuromuscular Re-education Detail: Pt demo ability to track in all directions except past midline on R, with L eye improved ability compared to R eye // Pt demo active cervical spine rotation in limited ROM and gravity eliminated position // Pt with ability to actively squeeze R hand on command // Pt able to perform active R ankle PF/DF on command // Pt able to feel noxious stimuli on RUE and RLE // Pt with trace bilateral shoulder shrug         Occupational Therapy: 12/6    Cognitive/Neuro/Behavioral  Cognitive/Neuro/Behavioral [WDL Definition: Alert; opens eyes spontaneously; arouses to voice or touch; oriented x 4; follows commands; speech spontaneous, logical; purposeful motor response; behavior appropriate to situation]: WDL except  Level of Consciousness: confused;  alert  Arousal Level: arouses to voice  Orientation: disoriented to;  time;  situation  Speech: trached  Mood/Behavior: calm    Language Assistance  Preferred Language to Address Healthcare Preferred Language to Address Healthcare: Grenadian  Preferred Sign Language Preferred Sign Language: OT Gaye and OT Mike able to communicate in Grenadian     Respiratory      Respiratory Pre/Post Treatment Assessment  O2 Delivery/Oxygen Delivery Method Patient On (Oxygen Delivery Method): tracheostomy collar    Therapeutic Interventions      Bed-Chair Transfer Training  Transfer Training Bed-to-Chair Transfer: dependent (less than 25% patient effort);  verbal cues;  nonverbal cues (demo/gestures);  2 person assist;  +3rd person for line management;  lateral transfer with hover mat  Bed-to-Chair Transfer Training Transfer Safety Analysis: decreased balance;  decreased cognition;  decreased flexibility;  decreased strength;  impaired balance;  decreased ROM;  impaired coordination;  impaired motor control;  impaired postural control;  pain    Therapeutic Exercise  Therapeutic Exercise Detail: Pt tolerated sitting in bedside chair ~35 minutes. Pt able to briskly performed R cerivcal rotation to command in 2/2 trials, pt able to perform L cervical rotation to command in 2/2 trials with increased time and effort. Pt able to initiate cervical flexion and extension however unable to perform full range or maintain head in neutral without min-modA, pt performed R cervical rotation to withdraw from passive L/R lateral flexion. Pt able to close mouth to command in 2/5 trials with increased verbal and visual cues. Pt continues to track in B eyes vertically, L eye tracks to L and R while R eye cannot cross midline. Pt with 1/5 strength in R shoulder when asked to perform shoulder shrug, pt L shoulder remains 0/5. Pt continues to perform R hand squeeze to command with increasing ability to perform finger extension however pt still unable to fully extend all fingers.     Lower Body Dressing Training  Lower Body Dressing Training Assistance: dependent (less than 25% patient effort);  verbal cues;  nonverbal cues (demo/gestures);  1 person assist;  adjust socks in sitting;  decreased flexibility;  decreased ROM;  decreased strength;  impaired balance;  impaired coordination;  impaired motor control;  impaired postural control    OT Cognitive Treatment  OT Cognitive Treatment Treatment Detail: Pt continues to utilize eyebrow raise for "yes" and eyes closed for "no" (mostly in L eye to command) per SLP rec to repsond to therapist questions. Pt grossly A&Ox2-3 (able to confirm name, year and hospital), follows 80% single step commands. Patient scored a (5/30) on the O-Log. A score of >25 is associated with normal orientation, pt max score on O-LOG /10 2/2 pt requires yes/no responses. Pt requires cues to orient to month, date, day of week, name of hospital, situation and deficits.         Speech Therapy: 12/4  Overall Progress Summary    Progress Summary: Speech f/u completed. Pt was seen fully awake and alert, HOB fully elevated, on O2 via trach collar. Cuff was partially inflated. Cuff fully deflated by this SLP- this was reviewed by RAMONA Rolon and MISTY Don. Oral and tacheal suctioning completed prior to full cuff deflation- copious sections retrieved. Oral care completed as well by RN. Pt tolerated full cuff deflation throughout session without signs of respiratory distress and maintained >97% O2 saturation. Spontanous mouth movements noted. In consultation with PT/OT, now utilizing eye brow raise to indicate "yes." This was used during this session as well. Eyes closed for "no" introduced today and utilized throughout session. Pt answered simple yes/no questions in relation to self, orientation questions in relation to place, and environment with 100% accuracy utilizing these nonverbal indicators for yes/no. Pt also correctly identified colors in 9/10= 90% using the same indicators and answered yes/no with 100% to ID pictures of objects. Yes/no was also used to indicate wants/needs- for example, pt was asked if he was cold, pt raised eyebrows for "yes." [place] was placed- pt was asked the question again and he indicated "no" with eye closed (he primarily gets left eye closure). PMV was also placed throughout session - no signs of resp distress or airstacking. Pt tolerated speaking valve use without signs/symptoms of resp distress and maintained oxygen saturation >97%. PMV was removed upon completion of the session, though enocuraged to be used with supervision as tolerated and with precautions in place. Full cuff deflation with PMV use reviewed with nursing and warning sign placed above bed. PMV use encouraged with PT/OT as tolerated - reviewed with clinicians, nursing, and NSGY. Pt left is no apparent distress. Cuff left fully deflated- medical team and nursing all aware and in agreement to continue full cuff deflation as tolerated to allow for improvement in sensation, upper airway flow, and cough effectiveness. This service to continue to follow.     -----------------------------------------------------------------------  VITALS  T(C): 36.7 (12-06-24 @ 09:04), Max: 37.4 (12-06-24 @ 04:52)  HR: 70 (12-06-24 @ 10:01) (68 - 92)  BP: 130/94 (12-06-24 @ 09:23) (122/92 - 141/99)  RR: 18 (12-06-24 @ 10:01) (16 - 18)  SpO2: 92% (12-06-24 @ 10:01) (92% - 100%)  Wt(kg): --      PHYSICAL EXAM  Constitutional - NAD, Comfortable  HEENT - NCAT  Chest - Breathing comfortably, +trach collar  Cardiovascular - Regular pulse  Abdomen - No visible abdominal distension  Extremities - No deformities of upper or lower limbs. No calf tenderness   MSK - ROM within functional limits  Neurologic Exam -                    Cognitive - Awake, follows commands inconsistently, squeezes hand on command. +vertical eye movements for yes/no questions     Communication - non-verbal     Cranial Nerves -  difficulty tracking horizontally     Motor - trace movement of right hand. No other voluntary movement at this time, although limited by fatigue   Psychiatric - Mood stable, Affect WNL       -----------------------------------------------------------------------  RECENT LABS  CBC Full  -  ( 06 Dec 2024 05:56 )  WBC Count : 6.87 K/uL  RBC Count : 3.76 M/uL  Hemoglobin : 11.5 g/dL  Hematocrit : 35.5 %  Platelet Count - Automated : 219 K/uL  Mean Cell Volume : 94.4 fl  Mean Cell Hemoglobin : 30.6 pg  Mean Cell Hemoglobin Concentration : 32.4 g/dL  Auto Neutrophil # : x  Auto Lymphocyte # : x  Auto Monocyte # : x  Auto Eosinophil # : x  Auto Basophil # : x  Auto Neutrophil % : x  Auto Lymphocyte % : x  Auto Monocyte % : x  Auto Eosinophil % : x  Auto Basophil % : x    12-06    140  |  106  |  61[H]  ----------------------------<  108[H]  4.1   |  24  |  1.34[H]    Ca    9.4      06 Dec 2024 05:56  Phos  4.0     12-06  Mg     2.3     12-06      Urinalysis Basic - ( 06 Dec 2024 05:56 )    Color: x / Appearance: x / SG: x / pH: x  Gluc: 108 mg/dL / Ketone: x  / Bili: x / Urobili: x   Blood: x / Protein: x / Nitrite: x   Leuk Esterase: x / RBC: x / WBC x   Sq Epi: x / Non Sq Epi: x / Bacteria: x        -----------------------------------------------------------------------  IMAGING      -----------------------------------------------------------------------  MEDICATIONS   acetaminophen   Oral Liquid .. 650 milliGRAM(s) every 6 hours PRN  acetylcysteine 10%  Inhalation 4 milliLiter(s) every 8 hours  albuterol/ipratropium for Nebulization 3 milliLiter(s) every 8 hours  artificial tears (preservative free) Ophthalmic Solution 1 Drop(s) every 4 hours  chlorhexidine 2% Cloths 1 Application(s) <User Schedule>  doxazosin 8 milliGRAM(s) at bedtime  erythromycin   Ointment 1 Application(s) at bedtime  heparin   Injectable 5000 Unit(s) every 8 hours  ofloxacin 0.3% Solution 1 Drop(s) four times a day  petrolatum Ophthalmic Ointment 1 Application(s) two times a day  polyethylene glycol 3350 17 Gram(s) daily  senna 2 Tablet(s) at bedtime

## 2024-12-06 NOTE — PROGRESS NOTE ADULT - SUBJECTIVE AND OBJECTIVE BOX
Patient is a 44y old  Male who presents with a chief complaint of brain stem bleed (06 Dec 2024 01:01)        SUBJECTIVE:  Patient was seen and examined at bedside, clinically appears similar to prior.    Overnight Events :     Last Bowel Movement: 30-Nov-2024 (12-05)  Last Bowel Movement: 30-Nov-2024 (12-04)  Last Bowel Movement: 30-Nov-2024 (12-04)  Last Bowel Movement: 30-Nov-2024 (12-03)  Last Bowel Movement: 30-Nov-2024 (12-03)  Last Bowel Movement: 30-Nov-2024 (12-02)  Last Bowel Movement: 30-Nov-2024 (12-01)  Last Bowel Movement: 30-Nov-2024 (12-01)      Review of systems: 12 point Review of systems negative unless otherwise documented elsewhere in note.     Diet, NPO with Tube Feed:   Tube Feeding Modality: Gastrostomy  Maribeth App[employer] Renal Support 1.8  Total Volume for 24 Hours (mL): 1080  Total Number of Cans: 5  Continuous  Starting Tube Feed Rate mL per Hour: 45  Increase Tube Feed Rate by (mL): 10     Every 4 hours  Until Goal Tube Feed Rate (mL per Hour): 60  Tube Feed Duration (in Hours): 18  Tube Feed Start Time: 10:00  Tube Feed Stop Time: 04:00  Banatrol TF     Qty per Day:  2 (11-20-24 @ 10:26) [Active]      MEDICATIONS:  MEDICATIONS  (STANDING):  acetylcysteine 10%  Inhalation 4 milliLiter(s) Inhalation every 8 hours  albuterol/ipratropium for Nebulization 3 milliLiter(s) Nebulizer every 8 hours  artificial tears (preservative free) Ophthalmic Solution 1 Drop(s) Right EYE every 4 hours  chlorhexidine 2% Cloths 1 Application(s) Topical <User Schedule>  doxazosin 8 milliGRAM(s) Oral at bedtime  erythromycin   Ointment 1 Application(s) Right EYE at bedtime  heparin   Injectable 5000 Unit(s) SubCutaneous every 8 hours  ofloxacin 0.3% Solution 1 Drop(s) Right EYE four times a day  petrolatum Ophthalmic Ointment 1 Application(s) Both EYES two times a day  polyethylene glycol 3350 17 Gram(s) Oral daily  senna 2 Tablet(s) Oral at bedtime    MEDICATIONS  (PRN):  acetaminophen   Oral Liquid .. 650 milliGRAM(s) Oral every 6 hours PRN Temp greater or equal to 38.5C (101.3F), Mild Pain (1 - 3)      Allergies    Allergy Status Unknown    Intolerances        OBJECTIVE:  Vital Signs Last 24 Hrs  T(C): 36.7 (06 Dec 2024 09:04), Max: 37.4 (06 Dec 2024 04:52)  T(F): 98 (06 Dec 2024 09:04), Max: 99.4 (06 Dec 2024 04:52)  HR: 70 (06 Dec 2024 10:01) (68 - 92)  BP: 130/94 (06 Dec 2024 09:23) (122/92 - 141/99)  BP(mean): 108 (06 Dec 2024 09:23) (101 - 115)  RR: 18 (06 Dec 2024 10:01) (16 - 18)  SpO2: 92% (06 Dec 2024 10:01) (92% - 100%)    Parameters below as of 06 Dec 2024 10:01  Patient On (Oxygen Delivery Method): tracheostomy collar  O2 Flow (L/min): 10  O2 Concentration (%): 40  I&O's Summary    05 Dec 2024 07:01  -  06 Dec 2024 07:00  --------------------------------------------------------  IN: 730 mL / OUT: 1125 mL / NET: -395 mL        PHYSICAL EXAM:  General: awake, looking comfortable, no WOB on trach collar, not tracking horizontally today but tracking vertically  HEENT: tracheostomy clean  Lungs: poor inspiration, no crackles, no wheezes  Heart: regular  Abdomen: soft, no visible tenderness, + BS  Extremities: warm, no edema, not moving to command    LABS:                        11.5   6.87  )-----------( 219      ( 06 Dec 2024 05:56 )             35.5     12-06    140  |  106  |  61[H]  ----------------------------<  108[H]  4.1   |  24  |  1.34[H]    Ca    9.4      06 Dec 2024 05:56  Phos  4.0     12-06  Mg     2.3     12-06          CAPILLARY BLOOD GLUCOSE        Urinalysis Basic - ( 06 Dec 2024 05:56 )    Color: x / Appearance: x / SG: x / pH: x  Gluc: 108 mg/dL / Ketone: x  / Bili: x / Urobili: x   Blood: x / Protein: x / Nitrite: x   Leuk Esterase: x / RBC: x / WBC x   Sq Epi: x / Non Sq Epi: x / Bacteria: x

## 2024-12-06 NOTE — PROGRESS NOTE ADULT - TIME BILLING
Bedside exam and interview   Reviewed vitals, labs   Discussed patient's plan of care with primary  Documentation of encounter  Excludes teaching and separately reported services

## 2024-12-06 NOTE — PROGRESS NOTE ADULT - SUBJECTIVE AND OBJECTIVE BOX
HPI:  Unknown age male, unknown past medical history (reported stroke and MI by coworkers) presented to [facility] with AMS, Pt was working at [employer] when he was found down by coworkers. EMS called and pt brought to [facility] ED. Intubated, sedated, started on cardene for SBPs in 200s. CT head showed brain stem bleed. Transferred to NSICU for further management.  (30 Sep 2024 12:55)      Subjective:  GEORGE overnight. Pt had BM.    Hospital course:  9/30: transferred from [facility]. A line placed. Versed dc'd. Cy Rader at bedside, states pt has family in [place], cannot confirm medications or PMH other than stroke and MI. 250cc bolus 3% given. LR switched to NS. hydralazine 25q8 started, 3% started, switched propofol to precedex   10/1: stability CTH done. Added labetalol, started TF. Palliative consulted. ethics consulted to determine surrogate. febrile 103, pan cx sent  10/2: BD 2, GEORGE overnight. TF resumed. Desatt'd to 80s, FiO2 inc. to 50. Fentanyl given, ABG, CXR ordered. Maxxed on precedex, started on propofol for DARIEN -4 - -5. Precedex dc'd. Duonebs, mucomyst, hypertonic added. 3% dc'd. Cardene dc'd. Start vanc/CTX. Increased labetalol 200q8. MRSA negative, dc'd vanc. ETT pulled back 2cm x 2, good positioning after confirmatory chest xray. Ethics attempting to establish HCP with family. Na 159, starting FW 250q6 for range 150-155.   10/3: BD3, GEORGE o/n, neuro stable. Na elevating, FW increased to 300q6. Dc'd bowel reg for diarrhea. vEEG started. SQH 5000q8 tonight.   10/4: BD 4, albumn bolus, incr. LR to 80 2/2 incr. in Cr, LR to 100cc/hr for uptrending Cr. Started 7% hypersal for 48hrs and SL atropine for inline/oral thick secretions. Dc'd CTX and started ancef for MSSA in the sputum. Nephrology consulted for CKD, f/u recs. SBP 170s, given hydralazine 10mg IVP.   10/5: BD5, o/n 10mg IVP hydralazine given for SBP 170s and started on hydralazine 25q8 via OGT. 10mg IV push labetalol for SBP > 160s. RT placed for diarrhea.   10/6: BD6, o/n FW increased to 350q4 per nephrology recs. IV tylenol for temp 100.6, SBp 160s presumed uncomfortable.   10/7: BD7, overnight pancultured for temp 101.8F.   10/8: BD8. GEORGE. Cr bumped. decreased LR to 75cc/hr. Adding simethicone ATC. incr hydralazine 50mgTID. Incr labetalol 300mgTID. Na 145, decreased FWF to 250q6. Start precedex. FENa consistent with intrinsic kidney injury. Pend repeat renal US. Retaining up to 1.3L, bladder scans q6, straight cath PRN  10/9: BD 9. GEORGE overnight. Neuro stable. abd xray for distention w non-specific gas pattern, OGT to LIWS for morning. duonebs/mucomyst to q8 for improving secretions. Changed tube feeds to Jevity 1.5 20cc/hr, low rate due to abdominal distention, nepro dense and more difficult to digest. Tolerating CPAP, confirmed by ABG.   10/10: BD 10. GEORGE overnight. Neuro stable. (+) gabriel for urinary retention on bladder scan. inc TF to goal rate of 40cc/hr. family leaning toward pursuing trach/PEG. 1/2 amp for FS 81.   10/11: BD 11. GEORGE overnight. Neuro stable. Trach/PEG consults placed.   10/12: BD 12. GEORGE overnight. Neuro stable. MRI brain complete.   10/13: BD 13. Increase flomax. Hold SQH after PM dose for trach tm. IVL.   10/14: BD 14. GEORGE overnight, remains on AC/VC. Gabriel placed for urinary retention. Dc'd free water.  S/p trach with pulm. NGT placed and CXR confirmed in good position.   10/15: BD 15, GEORGE ovn. resumed feeds. spiked 101, pan cx sent.   10/16: BD 16. GEORGE ovn. Lokelma 5mg for K+ 5.4. Started vanc q 24/zosyn for empiric PNA coverage, IVF to 100/hr. PEG held for fever.   10/17: BD 17,  ordered serum osm and urine osm for am. Started sinemet for neurostimulation. Increased cardura to 0.8. Started FW 100q4, dc'd IVF. MRSA negative, dc'd vanc. NGT replaced d/t coiling.   10/18: BD 18, GEORGE overnight, neuro stable. Amantadine added for neurostim. zosyn changed to unasyn for acinetobacter baumannii, failed TOV and required SC  10/19: BD 19, GEORGE ovn. cardura 2mg added for retention. labetalol decreased 200q8, hydralazine decreased 25q8. Gabriel replaced.   10/20: BD20, GEORGE overnight. NGT dislodged, replaced. PEG tomorrow w/ gen surg, FW increased to 150q4 and labetalol decreased to 100q8, lokelma given for hyperkalemia.   10/21: BD 21. POD0 PEG placement with Gen surg. decr labetolol to 50q8, incr. cardura to 0.4, started lokelma and phoslo, dc gabriel POD0 PEG placement with Gen surg.  10/22: BD 22. Plan to start TF today via PEG. dc labetalol, Following ophtho recs. Increased apnea settings - found to be in cheyne-moe respiration. CPAP 5/5.  10/23: BD 23. hydralazine d/c'd, trach collar trial today. Rectal tube placed at 6am.  10/24: BD24, o/n lokelma held due to diarrhea. Free water 100q6 resumed. dc'd tamsulosin, amantadine. Incr'd cardura to 8mg qhs. Dc'd FW. Switched jevity to nepro. gabriel placed for high urine output. Started SL atropine for oral secretions. Dc'd free water.  10/25: BD25, o/n decreased suctioning requirements to > q4hrs, GEORGE. Cr improving, cont phoslo, lokelma held at this time. Gabriel placed yest, cont. Tolerating trach collar. Given 500cc plasmalyte bolus for ANIKA. Dc'd sinemet.   10/26: BD26, o/n resumed lokelma 5mg daily and resumed 100cc free water q6hrs. Change in neuro status with new right pupillary dilation with anisocoria (right pupil 6mm fixed and left pupil 3mm briskly reactive). Given 23.4% NaCl bullet, taken for emergent CTH showing mostly resolved pontine hemorrhage, continued brainstem hypodensity likely edema d/t hemorrhage, no new hemorrhage or infarct, no herniation, mild increase in size of left lateral ventricle. Vitals remaine stable. Na goal > 140.   10/27: BD27, o/n GEORGE.Neuro stable. Pend stepdown with airway bed.   10/28: BD 28. GEORGE overnight. Neuro stable. Miralax ordered. Gabriel removed, pending TOV.  10/29: BD 29. GEORGE o/n. Given 2L NS over 8 hrs for increased BUN/Cr ratio. Gabriel placed for frequent straight cath.   10/30: BD 30.   10/31: BD 31. GEORGE overnight. Na 149, increased free water to 200q6. 1L NS for uptrending BUN.   11/1: BD 32. GEORGE overnight. Given 1L NS for dehydration. Na 146, increased FW to 250q6.   11/2: BD 33 GEORGE overnight, neuro stable, given 500cc bolus for net negative status and tachycardia   11/3: BD 34, GEORGE overnight, neuro stable. Patient remains tachycardic, EKG showing sinus tachycardia, given additional 500cc NS bolus. Febrile to 101.9F, pan cultured (without UA), CXR WNL, given tylenol.   11/4: BD 35, GEORGE overnight, neuro stable. Given 1L NS for tachycardia. sputum (+) for stenotropohomas maltophilia.   11/5: BD 36 GEORGE overnight, neuro stable. Vancomycin dc'd. Chest PT BID. ID consulted, cont zosyn.  11/6: BD 37. blood cx + klebsiella dc zosyn changed to cefepime, CTAP ordered, rpt blood cx sent.    11/7: BD 38. Pending CT A/P, given 250cc bolus and starting maintenance fluids overnight. Pending CT A/P after bolus   11/8: BD 39. CT CAP negative for infection.   11/9: BD 40. GEORGE overnight.  11/10: BD 41. GEORGE overnight. desat to 85 on trach collar, O2 inc to 10L and 100%, O2 sat inc to 95. pt tachy to 110s, euvolemic. given tylenol. ABG and CXR ordered. spiked fever, pancultured, RVP negative. AM ABG w pO2 79, rpt w pO2 79. pt appears comfortable, satting 94%.   11/11: BD 42. GEORGE overnight. pt became tachy to 130s, desat to 90 on 100% FiO2 and 10L. suctioned, (+) productive cough. temp 101.4, given 1g IV tylenol and 500cc NS bolus for euvolemia. fever and HR downtrending. LE dopplers negative for dvt  11/12: BD 43, GEORGE ovn, fever and HR downtrending, satting 97% 70% FIO2  11/13: BD 44, GEROGE ovn. started standing tylenol x24 hours for tachycardia. desat to 80s, o2 increased. CXR stable, pending CTA PE protocol.   11/14: BD 45, GEORGE overnight, neuro stable. resp therapy dec FiO2 to 70%.   11/15: BD 46, GEORGE overnight, neuro stable.  Rapid called for desaturation 30s, tachycardic 140s. Patient bagged, 100% fio2, heavily suctioned. CXR/POCUS unremarkable. ABG c/w desaturation. WBC 14.71. Afebrile. O2 improved to 90s and patient upgraded to ICU. ABG paO2 30s improved to 89 on vent. IV Tylenol x 1, sputum sent. Start protonix while o-n vent.   11/16: BD 47. POCUS showed collapsable IVCF, given 1L bolus. Vanco/Cefpime added empriic for PNA, NGT feeds restarted. MRSA swab neg, Vanc DC'd.   11/17: BD 48. GEORGE overnight. 1l bolus for tachycardia. Spiked to 101, cultured. 500cc bolus for tachycardia, tachy to 148 given 25mcg fentanyl, 250cc albumin, 1.5L bolus. 5 IV lopressor with response HR to 100s. +Stenotrophomonas on sputum cx.   11/18: BD 49. GEORGE overnight. Consulted ID, cefepime switched to bactrim x7days. Started hydrochlorothiazine 12.5mg daily.  11/19: BD 50. Tachy 120s, given tylenol and 500cc NS. Tolerating 5/5, switched to TCx. Holding phos binder. D/c Bactrim. D/c gabriel, f/u TOV. Dc'd PPI.   11/20: BD 51. GEORGE ovn. 1600 satting low 90s, mildly tachy to 110s, afebrile, RR wnl. O2 improved to mid 90s while inc O2 to 100% on TCx. CPAP 5/5 placed back on.  11/21: BD 52, GEORGE ovn, tolerating CPAP 5/5. Switch to trach collar during the day if tolerating well. HCTZ held for Cr bump, straight cath frequence increased to q4  11/22: BD 53, GEORGE ovn. Resumed phoslo. Gabriel placed. Resumed HCTZ.   11/23: BD 54. Holding tylenol in setting of possible fever, will require pan cx if febrile. Cr improved today. Cont CPAP. Bowel regimen held i/s/o diarrhea. FOBT negative.  11/24: BD 55. GEORGE overnight. Neuro stable. HCTZ dc'ed, started lisinopril 5. Lokelma dc'ed for K 3.7.   11/25: BD 56, GEORGE overnight. Neuro stable. dc'd lisinopril 5mg. Gabriel dc'd. TOV. 1545 noted to be hypotensive, MAP 50, in supine position on chair, HR 60s, afebrile, O2 96%. Given 1L cc NS bolus, placed back on bed in reverse trendelenberg, improved to map of 66. Neostick at bedside. Vitals check q1h. Dc'ed amlodipine. Failed TOV, bladder scan q6, sc prn. Added back Senna.   11/26: BD 57, GEORGE overnight. Neuro stable. Dc'd phoslo.   11/27: BD 58, GEORGE overnight. Neuro stable.    11/28: BD 59. Gabriel replaced.   11/29: GEORGE.  11/30: GEORGE, neuro stable.   12/1: BD 62, GEORGE overnight  12/2: BD 63, GEORGE overnight.; Given 1L bolus of LR for uptrending BUN/Cr.  12/3: BD 64. Reinstated eye gtt/moisture chamber given increased Rt eye injection  12/4: BD 65. GEORGE overnight. Attempted to speak with ophtho regarding eyelid weight/closure but no answer, full mailbox.   12/5: BD 66, GEORGE overnight, bowel regimen increased and had BM.   12/6: BD 67, GEORGE overnight    Vital Signs Last 24 Hrs  T(C): 37.2 (05 Dec 2024 22:00), Max: 37.6 (05 Dec 2024 08:55)  T(F): 98.9 (05 Dec 2024 22:00), Max: 99.7 (05 Dec 2024 08:55)  HR: 76 (05 Dec 2024 23:58) (68 - 92)  BP: 124/87 (05 Dec 2024 23:58) (122/92 - 141/99)  BP(mean): 101 (05 Dec 2024 23:58) (99 - 115)  RR: 18 (05 Dec 2024 23:58) (16 - 18)  SpO2: 99% (05 Dec 2024 23:58) (94% - 100%)    Parameters below as of 05 Dec 2024 23:58  Patient On (Oxygen Delivery Method): tracheostomy collar  O2 Flow (L/min): 10  O2 Concentration (%): 40    I&O's Summary    04 Dec 2024 07:01  -  05 Dec 2024 07:00  --------------------------------------------------------  IN: 920 mL / OUT: 925 mL / NET: -5 mL    05 Dec 2024 07:01  -  06 Dec 2024 01:02  --------------------------------------------------------  IN: 550 mL / OUT: 725 mL / NET: -175 mL        PHYSICAL EXAM:  General: patient seen laying supine in bed in NAD  Neuro: OEs to noxious, A&Ox0, does not follow commands, R hand spontanous movement in fingers, RLE withdraws to noxious, LLE  withdrawal to noxious, LUE 0/5  HEENT: PERRL, only tracks vertically, +trach collar  Neck: supple  Cardiac: RRR, S1S2  Pulmonary: chest rise symmetric  Abdomen: soft, nontender, nondistended, +PEG  Ext: perfusing well  Skin: warm, dry        LABS:                        11.3   7.53  )-----------( 191      ( 05 Dec 2024 05:30 )             36.6     12-05    139  |  104  |  58[H]  ----------------------------<  137[H]  3.8   |  22  |  1.25    Ca    9.4      05 Dec 2024 05:30  Phos  3.9     12-05  Mg     2.2     12-05        Urinalysis Basic - ( 05 Dec 2024 05:30 )    Color: x / Appearance: x / SG: x / pH: x  Gluc: 137 mg/dL / Ketone: x  / Bili: x / Urobili: x   Blood: x / Protein: x / Nitrite: x   Leuk Esterase: x / RBC: x / WBC x   Sq Epi: x / Non Sq Epi: x / Bacteria: x          CAPILLARY BLOOD GLUCOSE          Drug Levels: [] N/A    CSF Analysis: [] N/A      Allergies    Allergy Status Unknown    Intolerances      MEDICATIONS:  Antibiotics:    Neuro:  acetaminophen   Oral Liquid .. 650 milliGRAM(s) Oral every 6 hours PRN    Anticoagulation:  heparin   Injectable 5000 Unit(s) SubCutaneous every 8 hours    OTHER:  acetylcysteine 10%  Inhalation 4 milliLiter(s) Inhalation every 8 hours  albuterol/ipratropium for Nebulization 3 milliLiter(s) Nebulizer every 8 hours  artificial tears (preservative free) Ophthalmic Solution 1 Drop(s) Right EYE every 4 hours  chlorhexidine 2% Cloths 1 Application(s) Topical <User Schedule>  doxazosin 8 milliGRAM(s) Oral at bedtime  erythromycin   Ointment 1 Application(s) Right EYE at bedtime  ofloxacin 0.3% Solution 1 Drop(s) Right EYE four times a day  petrolatum Ophthalmic Ointment 1 Application(s) Both EYES two times a day  polyethylene glycol 3350 17 Gram(s) Oral daily  senna 2 Tablet(s) Oral at bedtime    IVF:    CULTURES:  Culture Results:   Mixed gram negative rods including Moderate Stenotrophomonas maltophilia  Commensal iram consistent with body site (11-25 @ 00:19)    RADIOLOGY & ADDITIONAL TESTS:    AMS    Handoff    MEWS Score    Acute myocardial infarction    CVA (cerebral vascular accident)    Intracerebral hemorrhage of brain stem    Brainstem stroke    Brain stem stroke syndrome    Brain stem hemorrhage    Brain stem stroke syndrome    Hemorrhagic stroke    Brainstem stroke    Encephalopathy acute    Functional quadriplegia    Advanced care planning/counseling discussion    Encounter for palliative care    Pontine hemorrhage    Neurogenic dysphagia    Chronic respiratory failure    Acute kidney injury superimposed on CKD    Acute urinary retention    Hypertensive emergency    Sepsis, unspecified organism    Sepsis    Gram-negative bacteremia    Percutaneous tracheal puncture    Altered mental status examination    EGD, with PEG    AMS    [email or web address]        ASSESSMENT:  45 y/o PMH ?stroke/MI present to [facility] after collapsing at work. Decorticate posturing, vomiting, intubated for airway protection. 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 Transferred to [facility] for further management. s/p trach 10/14. s/p peg 10/21. Re-upgrade to ICU 2/2 desaturation event and suctioning requirements 11/15.     PLAN:  Neuro:  - neuro/vitals q4h, protected sleep time 10PM-5AM  - pain control: tylenol prn  - vEEG (10/3-4)- negative, (10/17-10/19) - negative  - CTH 9/30: enlarged pontine hemorrhage, CTH 10/3: stable, CTH 10/25: mostly resolved pontine hemorrhage  - MRI brain 10/12: parenchymal hemorrhage, acute/subacute R cerebellar stroke    - stroke core measures, stroke neuro signed off    CV:  - -160  - HTN: hold amlodipine 10mg, hold lisinopril 5mg   - echo (9/30) EF 75%    Resp:  - trach collar   - Secretions: duonebs/mucomyst/chest PT q8h   - CTA chest 11/13 negative for PE, inc ground glass opacities    GI:  - TF via PEG (placed 10/21 by gen surg)  - bowel regimen, last BM 12/5  - CTAP 11/8 negative for infection    - FOBT negative 11/23    Renal:  - IVL  - FW flushes 250cc q6hrs for uptrending BUN/Cr  - Urinary retenion: Gabriel replaced 11/28  - CKD: trend BUN/Cr  - renal US 10/1: echogenicity c/w chronic med renal dz, repeat 10/8: inc renal echogeicity, c/f medical renal disease w increased hydro     Endo:  - A1c 5.4    Heme:  - DVT ppx: SCDs, SQH 5000u q8h   - LE dopplers negative 11/11    ID:  - febrile, last pancx 11/16, MRSA swab neg 11/16   -  s/p Stenotrophomonas maltophilia PNA: s/p Bactrim (11/18-11/19) s/p Cefepime (11/16-11/18)  - 11/3, (+) sputum for stenotrophomas maltophlia, blood cx (+) klebsiella, cefepime 2gq12 (11/6 - 11/12)   - empiric tx: s/p zosyn (11/3-11/6) , s/p vanc  (11/3- 11/5)  - S/p Ancef (10/4-10/14) for PNA, and s/p Unasyn (10/18-10/23) +actinobacter baumanii     MISC:  - ophtho consult for keratitis  - erythromycin ointment to rt eye q4hrs, ofloxacin ointment to rt eye QID, artificial tears to rt eye q2hrs, moisture chamber at bedtime    Dispo: tele status, full code, pending placement and guardianship hearing     D/w Dr. D'Amico     Assessment:  Present when checked    []  GCS  E   V  M     Heart Failure: []Acute, [] acute on chronic , []chronic  Heart Failure:  [] Diastolic (HFpEF), [] Systolic (HFrEF), []Combined (HFpEF and HFrEF), [] RHF, [] Pulm HTN, [] Other    [] ANIKA, [] ATN, [] AIN, [] other  [] CKD1, [] CKD2, [] CKD 3, [] CKD 4, [] CKD 5, []ESRD    Encephalopathy: [] Metabolic, [] Hepatic, [] toxic, [] Neurological, [] Other    Abnormal Nurtitional Status: [] malnurtition (see nutrition note), [ ]underweight: BMI < 19, [] morbid obesity: BMI >40, [] Cachexia    [] Sepsis  [] hypovolemic shock,[] cardiogenic shock, [] hemorrhagic shock, [] neuogenic shock  [] Acute Respiratory Failure  []Cerebral edema, [] Brain compression/ herniation,   [] Functional quadriplegia  [] Acute blood loss anemia

## 2024-12-07 LAB
ANION GAP SERPL CALC-SCNC: 10 MMOL/L — SIGNIFICANT CHANGE UP (ref 5–17)
BUN SERPL-MCNC: 58 MG/DL — HIGH (ref 7–23)
CALCIUM SERPL-MCNC: 9.4 MG/DL — SIGNIFICANT CHANGE UP (ref 8.4–10.5)
CHLORIDE SERPL-SCNC: 104 MMOL/L — SIGNIFICANT CHANGE UP (ref 96–108)
CO2 SERPL-SCNC: 26 MMOL/L — SIGNIFICANT CHANGE UP (ref 22–31)
CREAT SERPL-MCNC: 1.28 MG/DL — SIGNIFICANT CHANGE UP (ref 0.5–1.3)
EGFR: 70 ML/MIN/1.73M2 — SIGNIFICANT CHANGE UP
EGFR: 70 ML/MIN/1.73M2 — SIGNIFICANT CHANGE UP
GLUCOSE SERPL-MCNC: 122 MG/DL — HIGH (ref 70–99)
HCT VFR BLD CALC: 37.2 % — LOW (ref 39–50)
HGB BLD-MCNC: 12 G/DL — LOW (ref 13–17)
MAGNESIUM SERPL-MCNC: 2.2 MG/DL — SIGNIFICANT CHANGE UP (ref 1.6–2.6)
MCHC RBC-ENTMCNC: 30.3 PG — SIGNIFICANT CHANGE UP (ref 27–34)
MCHC RBC-ENTMCNC: 32.3 G/DL — SIGNIFICANT CHANGE UP (ref 32–36)
MCV RBC AUTO: 93.9 FL — SIGNIFICANT CHANGE UP (ref 80–100)
NRBC # BLD: 0 /100 WBCS — SIGNIFICANT CHANGE UP (ref 0–0)
NRBC BLD-RTO: 0 /100 WBCS — SIGNIFICANT CHANGE UP (ref 0–0)
PHOSPHATE SERPL-MCNC: 4.1 MG/DL — SIGNIFICANT CHANGE UP (ref 2.5–4.5)
PLATELET # BLD AUTO: 197 K/UL — SIGNIFICANT CHANGE UP (ref 150–400)
POTASSIUM SERPL-MCNC: 3.7 MMOL/L — SIGNIFICANT CHANGE UP (ref 3.5–5.3)
POTASSIUM SERPL-SCNC: 3.7 MMOL/L — SIGNIFICANT CHANGE UP (ref 3.5–5.3)
RBC # BLD: 3.96 M/UL — LOW (ref 4.2–5.8)
RBC # FLD: 14.6 % — HIGH (ref 10.3–14.5)
SODIUM SERPL-SCNC: 140 MMOL/L — SIGNIFICANT CHANGE UP (ref 135–145)
WBC # BLD: 8.13 K/UL — SIGNIFICANT CHANGE UP (ref 3.8–10.5)
WBC # FLD AUTO: 8.13 K/UL — SIGNIFICANT CHANGE UP (ref 3.8–10.5)

## 2024-12-07 PROCEDURE — 99232 SBSQ HOSP IP/OBS MODERATE 35: CPT

## 2024-12-07 RX ADMIN — OFLOXACIN 1 DROP(S): 3 SOLUTION OPHTHALMIC at 00:22

## 2024-12-07 RX ADMIN — HEPARIN SODIUM 5000 UNIT(S): 1000 INJECTION INTRAVENOUS; SUBCUTANEOUS at 21:22

## 2024-12-07 RX ADMIN — OFLOXACIN 1 DROP(S): 3 SOLUTION OPHTHALMIC at 12:36

## 2024-12-07 RX ADMIN — Medication 1 APPLICATION(S): at 18:22

## 2024-12-07 RX ADMIN — OFLOXACIN 1 DROP(S): 3 SOLUTION OPHTHALMIC at 23:22

## 2024-12-07 RX ADMIN — ACETYLCYSTEINE 4 MILLILITER(S): 200 INHALANT RESPIRATORY (INHALATION) at 21:21

## 2024-12-07 RX ADMIN — OFLOXACIN 1 DROP(S): 3 SOLUTION OPHTHALMIC at 17:32

## 2024-12-07 RX ADMIN — ACETYLCYSTEINE 4 MILLILITER(S): 200 INHALANT RESPIRATORY (INHALATION) at 05:06

## 2024-12-07 RX ADMIN — IPRATROPIUM BROMIDE AND ALBUTEROL SULFATE 3 MILLILITER(S): .5; 2.5 SOLUTION RESPIRATORY (INHALATION) at 21:22

## 2024-12-07 RX ADMIN — Medication 1 DROP(S): at 17:40

## 2024-12-07 RX ADMIN — POLYETHYLENE GLYCOL 3350 17 GRAM(S): 17 POWDER, FOR SOLUTION ORAL at 12:36

## 2024-12-07 RX ADMIN — DOXAZOSIN MESYLATE 8 MILLIGRAM(S): 8 TABLET ORAL at 21:23

## 2024-12-07 RX ADMIN — Medication 1 APPLICATION(S): at 05:06

## 2024-12-07 RX ADMIN — Medication 1 DROP(S): at 14:52

## 2024-12-07 RX ADMIN — HEPARIN SODIUM 5000 UNIT(S): 1000 INJECTION INTRAVENOUS; SUBCUTANEOUS at 14:56

## 2024-12-07 RX ADMIN — Medication 40 MILLIEQUIVALENT(S): at 07:53

## 2024-12-07 RX ADMIN — Medication 10 MILLIGRAM(S): at 21:22

## 2024-12-07 RX ADMIN — IPRATROPIUM BROMIDE AND ALBUTEROL SULFATE 3 MILLILITER(S): .5; 2.5 SOLUTION RESPIRATORY (INHALATION) at 05:06

## 2024-12-07 RX ADMIN — Medication 2 TABLET(S): at 21:23

## 2024-12-07 RX ADMIN — OFLOXACIN 1 DROP(S): 3 SOLUTION OPHTHALMIC at 05:09

## 2024-12-07 RX ADMIN — Medication 1 DROP(S): at 09:06

## 2024-12-07 RX ADMIN — Medication 1 DROP(S): at 22:08

## 2024-12-07 RX ADMIN — ERYTHROMYCIN 1 APPLICATION(S): 5 OINTMENT OPHTHALMIC at 21:23

## 2024-12-07 RX ADMIN — Medication 1 APPLICATION(S): at 05:09

## 2024-12-07 RX ADMIN — ACETYLCYSTEINE 4 MILLILITER(S): 200 INHALANT RESPIRATORY (INHALATION) at 14:54

## 2024-12-07 RX ADMIN — HEPARIN SODIUM 5000 UNIT(S): 1000 INJECTION INTRAVENOUS; SUBCUTANEOUS at 05:05

## 2024-12-07 RX ADMIN — IPRATROPIUM BROMIDE AND ALBUTEROL SULFATE 3 MILLILITER(S): .5; 2.5 SOLUTION RESPIRATORY (INHALATION) at 14:53

## 2024-12-07 RX ADMIN — Medication 1 DROP(S): at 03:25

## 2024-12-07 RX ADMIN — Medication 1 DROP(S): at 05:10

## 2024-12-07 NOTE — PROGRESS NOTE ADULT - SUBJECTIVE AND OBJECTIVE BOX
Patient is a 44y old  Male who presents with a chief complaint of brain stem bleed (07 Dec 2024 02:26)        SUBJECTIVE:  Patient was seen and examined at bedside, no clinical change    Overnight Events : NAEON    Last Bowel Movement: 06-Dec-2024 (12-06)  Last Bowel Movement: 30-Nov-2024 (12-05)  Last Bowel Movement: 30-Nov-2024 (12-04)  Last Bowel Movement: 30-Nov-2024 (12-04)  Last Bowel Movement: 30-Nov-2024 (12-03)  Last Bowel Movement: 30-Nov-2024 (12-03)  Last Bowel Movement: 30-Nov-2024 (12-02)  Last Bowel Movement: 30-Nov-2024 (12-01)  Last Bowel Movement: 30-Nov-2024 (12-01)      Review of systems: 12 point Review of systems negative unless otherwise documented elsewhere in note.     Diet, NPO with Tube Feed:   Tube Feeding Modality: Gastrostomy  [place] Renal Support 1.8  Total Volume for 24 Hours (mL): 1080  Total Number of Cans: 5  Continuous  Starting Tube Feed Rate mL per Hour: 45  Increase Tube Feed Rate by (mL): 10     Every 4 hours  Until Goal Tube Feed Rate (mL per Hour): 60  Tube Feed Duration (in Hours): 18  Tube Feed Start Time: 10:00  Tube Feed Stop Time: 04:00  Banatrol TF     Qty per Day:  2 (11-20-24 @ 10:26) [Active]      MEDICATIONS:  MEDICATIONS  (STANDING):  acetylcysteine 10%  Inhalation 4 milliLiter(s) Inhalation every 8 hours  albuterol/ipratropium for Nebulization 3 milliLiter(s) Nebulizer every 8 hours  artificial tears (preservative free) Ophthalmic Solution 1 Drop(s) Right EYE every 4 hours  chlorhexidine 2% Cloths 1 Application(s) Topical <User Schedule>  doxazosin 8 milliGRAM(s) Oral at bedtime  erythromycin   Ointment 1 Application(s) Right EYE at bedtime  heparin   Injectable 5000 Unit(s) SubCutaneous every 8 hours  ofloxacin 0.3% Solution 1 Drop(s) Right EYE four times a day  petrolatum Ophthalmic Ointment 1 Application(s) Both EYES two times a day  polyethylene glycol 3350 17 Gram(s) Oral daily  senna 2 Tablet(s) Oral at bedtime    MEDICATIONS  (PRN):  acetaminophen   Oral Liquid .. 650 milliGRAM(s) Oral every 6 hours PRN Temp greater or equal to 38.5C (101.3F), Mild Pain (1 - 3)      Allergies    Allergy Status Unknown    Intolerances        OBJECTIVE:  Vital Signs Last 24 Hrs  T(C): 36.3 (07 Dec 2024 08:55), Max: 37.1 (06 Dec 2024 17:13)  T(F): 97.4 (07 Dec 2024 08:55), Max: 98.8 (06 Dec 2024 17:13)  HR: 64 (07 Dec 2024 08:55) (64 - 88)  BP: 144/99 (07 Dec 2024 08:55) (134/97 - 150/98)  BP(mean): 118 (07 Dec 2024 08:55) (105 - 118)  RR: 18 (07 Dec 2024 08:55) (14 - 18)  SpO2: 99% (07 Dec 2024 08:55) (89% - 99%)    Parameters below as of 07 Dec 2024 08:55  Patient On (Oxygen Delivery Method): tracheostomy collar  O2 Flow (L/min): 10  O2 Concentration (%): 40  I&O's Summary    06 Dec 2024 07:01  -  07 Dec 2024 07:00  --------------------------------------------------------  IN: 1930 mL / OUT: 1150 mL / NET: 780 mL    07 Dec 2024 07:01  -  07 Dec 2024 11:12  --------------------------------------------------------  IN: 0 mL / OUT: 150 mL / NET: -150 mL        PHYSICAL EXAM:  General: awake, looking comfortable, no WOB on trach collar, not tracking horizontally today but tracking vertically  HEENT: tracheostomy clean  Lungs: poor inspiration, no crackles, no wheezes  Heart: regular  Abdomen: soft, no visible tenderness, + BS  Extremities: warm, no edema, not moving to command    LABS:                        12.0   8.13  )-----------( 197      ( 07 Dec 2024 06:32 )             37.2     12-07    140  |  104  |  58[H]  ----------------------------<  122[H]  3.7   |  26  |  1.28    Ca    9.4      07 Dec 2024 06:32  Phos  4.1     12-07  Mg     2.2     12-07          CAPILLARY BLOOD GLUCOSE        Urinalysis Basic - ( 07 Dec 2024 06:32 )    Color: x / Appearance: x / SG: x / pH: x  Gluc: 122 mg/dL / Ketone: x  / Bili: x / Urobili: x   Blood: x / Protein: x / Nitrite: x   Leuk Esterase: x / RBC: x / WBC x   Sq Epi: x / Non Sq Epi: x / Bacteria: x        MICRODATA:      RADIOLOGY/OTHER STUDIES:

## 2024-12-07 NOTE — PROGRESS NOTE ADULT - ASSESSMENT
44M with unclear PMH (?stroke, MI) who was found down at work, intubated for airway protection and found to have acute parenchymal hemorrhage within nabil with mass effect (+ acute/subacute right cerebellar infarct) in setting of hypertensive emergency, transferred to [facility] for further neurosurgical care. Hospital course c/b poor neurologic recovery s/p trach-PEG, AUR s/p gabriel, ANIKA on CKD c/b hyperkalemia, HAP s/p amp-sulbactam (EOT 10/23), K aerogenes bacteremia  treated with 2 weeks of Cefepime per ID , On 11/15 he became septic and was transferred to NSICU due to increased o2 requirements and needed Vent support , Trach Cultures + for Stenotrophomonas which was treated with 7 days of Bactrim per ID , has been weaned of Vent since 11/23 and is now on 10lts at 40% o2 through Trach Collar     # Pontine hemorrhage  # Encephalopathy due to Intracranial Bleed   - Acute parenchymal hemorrhage within nabil with mass effect (+ acute/subacute right cerebellar infarct) in setting of hypertensive emergency.   - MRI brain also demonstrated acute/subacute R cerebellar stroke.   - No Neurosurgical Interventions were performed   - due to IPH and Cerebellar stroke patient has become Trach and Peg Dependent    # Hypertension  - Currently Normotensive , not on Any Anti HTN agents , IF BP Consistently > 140/90 can restart amlodipine 5mg daily     # Acute kidney injury  # Acute Tubular Necrosis superimposed on CKD. 3  - Pt s/p US renal with chronic medical renal disease  - Elevated BUN , Not on any meds that can elevate BUN , Hgb Stable   - 1 Litre LR bolus on 12/1, Cr improved to 1.2, today 1.3s within ptn baseline range, ctm     # Chronic respiratory failure.   -Pt s/p percutaneous trach by pulm on 10/14/24  - Continue trach collar   - Continue routine trach care and suctioning PRN  - Chest PT      # Neurogenic dysphagia.   -Pt s/pPEG with surgery 10/21/24  - TF per nutrition  - aspiration precautions and elevation of HOB.    #Acute urinary retention.   - doxazosin 8mg  - Failure TOV on 11/25/24     # Functional quadriplegia in  setting of brainstem hemorrhage  - decub precautions  - care per nursing protocol     # DVT prop  -Continue Heparin SQ q8     # Dispo: can likely be stepped down to floors given clinical stability

## 2024-12-07 NOTE — PROGRESS NOTE ADULT - SUBJECTIVE AND OBJECTIVE BOX
HPI:  Unknown age male, unknown past medical history (reported stroke and MI by coworkers) presented to [facility] with AMS, Pt was working at [employer] when he was found down by coworkers. EMS called and pt brought to [facility] ED. Intubated, sedated, started on cardene for SBPs in 200s. CT head showed brain stem bleed. Transferred to NSICU for further management.  (30 Sep 2024 12:55)    S/Overnight events:  GEORGE overnight, neuro stable.      Hospital Course:   : transferred from [facility]. A line placed. Versed dc'd. Cy Rader at bedside, states pt has family in [place], cannot confirm medications or PMH other than stroke and MI. 250cc bolus 3% given. LR switched to NS. hydralazine 25q8 started, 3% started, switched propofol to precedex   10/1: stability CTH done. Added labetalol, started TF. Palliative consulted. ethics consulted to determine surrogate. febrile 103, pan cx sent  10/2: BD 2, GEORGE overnight. TF resumed. Desatt'd to 80s, FiO2 inc. to 50. Fentanyl given, ABG, CXR ordered. Maxxed on precedex, started on propofol for DAIREN -4 - -5. Precedex dc'd. Duonebs, mucomyst, hypertonic added. 3% dc'd. Cardene dc'd. Start vanc/CTX. Increased labetalol 200q8. MRSA negative, dc'd vanc. ETT pulled back 2cm x 2, good positioning after confirmatory chest xray. Ethics attempting to establish HCP with family. Na 159, starting FW 250q6 for range 150-155.   10/3: BD3, GEORGE o/n, neuro stable. Na elevating, FW increased to 300q6. Dc'd bowel reg for diarrhea. vEEG started. SQH 5000q8 tonight.   10/4: BD 4, albumn bolus, incr. LR to 80 2/2 incr. in Cr, LR to 100cc/hr for uptrending Cr. Started 7% hypersal for 48hrs and SL atropine for inline/oral thick secretions. Dc'd CTX and started ancef for MSSA in the sputum. Nephrology consulted for CKD, f/u recs. SBP 170s, given hydralazine 10mg IVP.   10/5: BD5, o/n 10mg IVP hydralazine given for SBP 170s and started on hydralazine 25q8 via OGT. 10mg IV push labetalol for SBP > 160s. RT placed for diarrhea.   10/6: BD6, o/n FW increased to 350q4 per nephrology recs. IV tylenol for temp 100.6, SBp 160s presumed uncomfortable.   10/7: BD7, overnight pancultured for temp 101.8F.   10/8: BD8. GEORGE. Cr bumped. decreased LR to 75cc/hr. Adding simethicone ATC. incr hydralazine 50mgTID. Incr labetalol 300mgTID. Na 145, decreased FWF to 250q6. Start precedex. FENa consistent with intrinsic kidney injury. Pend repeat renal US. Retaining up to 1.3L, bladder scans q6, straight cath PRN  10/9: BD 9. GEORGE overnight. Neuro stable. abd xray for distention w non-specific gas pattern, OGT to LIWS for morning. duonebs/mucomyst to q8 for improving secretions. Changed tube feeds to Jevity 1.5 20cc/hr, low rate due to abdominal distention, nepro dense and more difficult to digest. Tolerating CPAP, confirmed by ABG.   10/10: BD 10. GEORGE overnight. Neuro stable. (+) gabriel for urinary retention on bladder scan. inc TF to goal rate of 40cc/hr. family leaning toward pursuing trach/PEG. 1/2 amp for FS 81.   10/11: BD 11. GEORGE overnight. Neuro stable. Trach/PEG consults placed.   10/12: BD 12. GEORGE overnight. Neuro stable. MRI brain complete.   10/13: BD 13. Increase flomax. Hold SQH after PM dose for trach tm. IVL.   10/14: BD 14. GEORGE overnight, remains on AC/VC. Gabriel placed for urinary retention. Dc'd free water.  S/p trach with pulm. NGT placed and CXR confirmed in good position.   10/15: BD 15, GEORGE ovn. resumed feeds. spiked 101, pan cx sent.   10/16: BD 16. GEORGE ovn. Lokelma 5mg for K+ 5.4. Started vanc q 24/zosyn for empiric PNA coverage, IVF to 100/hr. PEG held for fever.   10/17: BD 17,  ordered serum osm and urine osm for am. Started sinemet for neurostimulation. Increased cardura to 0.8. Started FW 100q4, dc'd IVF. MRSA negative, dc'd vanc. NGT replaced d/t coiling.   10/18: BD 18, GEORGE overnight, neuro stable. Amantadine added for neurostim. zosyn changed to unasyn for acinetobacter baumannii, failed TOV and required SC  10/19: BD 19, GEORGE ovn. cardura 2mg added for retention. labetalol decreased 200q8, hydralazine decreased 25q8. Gabriel replaced.   10/20: BD20, GEORGE overnight. NGT dislodged, replaced. PEG tomorrow w/ gen surg, FW increased to 150q4 and labetalol decreased to 100q8, lokelma given for hyperkalemia.   10/21: BD 21. POD0 PEG placement with Gen surg. decr labetolol to 50q8, incr. cardura to 0.4, started lokelma and phoslo, dc gabriel POD0 PEG placement with Gen surg.  10/22: BD 22. Plan to start TF today via PEG. dc labetalol, Following ophtho recs. Increased apnea settings - found to be in cheyne-moe respiration. CPAP 5/5.  10/23: BD 23. hydralazine d/c'd, trach collar trial today. Rectal tube placed at 6am.  10/24: BD24, o/n lokelma held due to diarrhea. Free water 100q6 resumed. dc'd tamsulosin, amantadine. Incr'd cardura to 8mg qhs. Dc'd FW. Switched jevity to nepro. gabriel placed for high urine output. Started SL atropine for oral secretions. Dc'd free water.  10/25: BD25, o/n decreased suctioning requirements to > q4hrs, GEORGE. Cr improving, cont phoslo, lokelma held at this time. Gabriel placed yest, cont. Tolerating trach collar. Given 500cc plasmalyte bolus for ANIKA. Dc'd sinemet.   10/26: BD26, o/n resumed lokelma 5mg daily and resumed 100cc free water q6hrs. Change in neuro status with new right pupillary dilation with anisocoria (right pupil 6mm fixed and left pupil 3mm briskly reactive). Given 23.4% NaCl bullet, taken for emergent CTH showing mostly resolved pontine hemorrhage, continued brainstem hypodensity likely edema d/t hemorrhage, no new hemorrhage or infarct, no herniation, mild increase in size of left lateral ventricle. Vitals remaine stable. Na goal > 140.   10/27: BD27, o/n GEORGE.Neuro stable. Pend stepdown with airway bed.   10/28: BD 28. GEORGE overnight. Neuro stable. Miralax ordered. Gabriel removed, pending TOV.  10/29: BD 29. GEORGE o/n. Given 2L NS over 8 hrs for increased BUN/Cr ratio. Gabriel placed for frequent straight cath.   10/30: BD 30.   10/31: BD 31. GEORGE overnight. Na 149, increased free water to 200q6. 1L NS for uptrending BUN.   : BD 32. GEORGE overnight. Given 1L NS for dehydration. Na 146, increased FW to 250q6.   : BD 33 GEORGE overnight, neuro stable, given 500cc bolus for net negative status and tachycardia   11/3: BD 34, GEORGE overnight, neuro stable. Patient remains tachycardic, EKG showing sinus tachycardia, given additional 500cc NS bolus. Febrile to 101.9F, pan cultured (without UA), CXR WNL, given tylenol.   : BD 35, GEORGE overnight, neuro stable. Given 1L NS for tachycardia. sputum (+) for stenotropohomas maltophilia.   : BD 36 GEORGE overnight, neuro stable. Vancomycin dc'd. Chest PT BID. ID consulted, cont zosyn.  : BD 37. blood cx + klebsiella dc zosyn changed to cefepime, CTAP ordered, rpt blood cx sent.    : BD 38. Pending CT A/P, given 250cc bolus and starting maintenance fluids overnight. Pending CT A/P after bolus   : BD 39. CT CAP negative for infection.   : BD 40. GEORGE overnight.  11/10: BD 41. GEORGE overnight. desat to 85 on trach collar, O2 inc to 10L and 100%, O2 sat inc to 95. pt tachy to 110s, euvolemic. given tylenol. ABG and CXR ordered. spiked fever, pancultured, RVP negative. AM ABG w pO2 79, rpt w pO2 79. pt appears comfortable, satting 94%.   : BD 42. GEORGE overnight. pt became tachy to 130s, desat to 90 on 100% FiO2 and 10L. suctioned, (+) productive cough. temp 101.4, given 1g IV tylenol and 500cc NS bolus for euvolemia. fever and HR downtrending. LE dopplers negative for dvt  : BD 43, GEORGE ovn, fever and HR downtrending, satting 97% 70% FIO2  : BD 44, GEORGE ovn. started standing tylenol x24 hours for tachycardia. desat to 80s, o2 increased. CXR stable, pending CTA PE protocol.   : BD 45, GEORGE overnight, neuro stable. resp therapy dec FiO2 to 70%.   11/15: BD 46, GEORGE overnight, neuro stable.  Rapid called for desaturation 30s, tachycardic 140s. Patient bagged, 100% fio2, heavily suctioned. CXR/POCUS unremarkable. ABG c/w desaturation. WBC 14.71. Afebrile. O2 improved to 90s and patient upgraded to ICU. ABG paO2 30s improved to 89 on vent. IV Tylenol x 1, sputum sent. Start protonix while o-n vent.   : BD 47. POCUS showed collapsable IVCF, given 1L bolus. Vanco/Cefpime added empriic for PNA, NGT feeds restarted. MRSA swab neg, Vanc DC'd.   : BD 48. GEORGE overnight. 1l bolus for tachycardia. Spiked to 101, cultured. 500cc bolus for tachycardia, tachy to 148 given 25mcg fentanyl, 250cc albumin, 1.5L bolus. 5 IV lopressor with response HR to 100s. +Stenotrophomonas on sputum cx.   : BD 49. GEORGE overnight. Consulted ID, cefepime switched to bactrim x7days. Started hydrochlorothiazine 12.5mg daily.  : BD 50. Tachy 120s, given tylenol and 500cc NS. Tolerating 5/5, switched to TCx. Holding phos binder. D/c Bactrim. D/c gabriel, f/u TOV. Dc'd PPI.   : BD 51. GEORGE ovn. 1600 satting low 90s, mildly tachy to 110s, afebrile, RR wnl. O2 improved to mid 90s while inc O2 to 100% on TCx. CPAP 5/5 placed back on.  : BD 52, GEORGE ovn, tolerating CPAP 5/5. Switch to trach collar during the day if tolerating well. HCTZ held for Cr bump, straight cath frequence increased to q4  : BD 53, GEORGE ovn. Resumed phoslo. Gabriel placed. Resumed HCTZ.   : BD 54. Holding tylenol in setting of possible fever, will require pan cx if febrile. Cr improved today. Cont CPAP. Bowel regimen held i/s/o diarrhea. FOBT negative.  : BD 55. GEORGE overnight. Neuro stable. HCTZ dc'ed, started lisinopril 5. Lokelma dc'ed for K 3.7.   : BD 56, GEORGE overnight. Neuro stable. dc'd lisinopril 5mg. Gabriel dc'd. TOV. 1545 noted to be hypotensive, MAP 50, in supine position on chair, HR 60s, afebrile, O2 96%. Given 1L cc NS bolus, placed back on bed in reverse trendelenberg, improved to map of 66. Neostick at bedside. Vitals check q1h. Dc'ed amlodipine. Failed TOV, bladder scan q6, sc prn. Added back Senna.   : BD 57, GEORGE overnight. Neuro stable. Dc'd phoslo.   : BD 58, GEORGE overnight. Neuro stable.    : BD 59. Gabriel replaced.   : GEORGE.  : GEORGE, neuro stable.   : BD 62, GEORGE overnight  : BD 63, GEORGE overnight.; Given 1L bolus of LR for uptrending BUN/Cr.  12/3: BD 64. Reinstated eye gtt/moisture chamber given increased Rt eye injection  : BD 65. GEORGE overnight. Attempted to speak with ophtho regarding eyelid weight/closure but no answer, full mailbox.   : BD 66, GEORGE overnight, bowel regimen increased and had BM.   : BD 67, GEORGE overnight, neuro stable.        Vital Signs Last 24 Hrs  T(C): 36.7 (06 Dec 2024 20:30), Max: 37.4 (06 Dec 2024 04:52)  T(F): 98 (06 Dec 2024 20:30), Max: 99.4 (06 Dec 2024 04:52)  HR: 88 (07 Dec 2024 00:30) (70 - 88)  BP: 135/93 (07 Dec 2024 00:20) (128/92 - 150/98)  BP(mean): 105 (07 Dec 2024 00:20) (104 - 116)  RR: 18 (07 Dec 2024 00:20) (16 - 18)  SpO2: 91% (07 Dec 2024 00:30) (91% - 99%)    Parameters below as of 07 Dec 2024 00:20  Patient On (Oxygen Delivery Method): tracheostomy collar  O2 Flow (L/min): 10  O2 Concentration (%): 40    I&O's Detail    05 Dec 2024 07:01  -  06 Dec 2024 07:00  --------------------------------------------------------  IN:    Free Water: 250 mL    Miscellaneous Tube Feedin mL  Total IN: 730 mL    OUT:    Indwelling Catheter - Urethral (mL): 1125 mL    Oral Fluid: 0 mL  Total OUT: 1125 mL    Total NET: -395 mL      06 Dec 2024 07:01  -  07 Dec 2024 02:27  --------------------------------------------------------  IN:    Enteral Tube Flush: 100 mL    Free Water: 500 mL    Miscellaneous Tube Feedin mL  Total IN: 1560 mL    OUT:    Indwelling Catheter - Urethral (mL): 950 mL    Oral Fluid: 0 mL  Total OUT: 950 mL    Total NET: 610 mL        I&O's Summary    05 Dec 2024 07:01  -  06 Dec 2024 07:00  --------------------------------------------------------  IN: 730 mL / OUT: 1125 mL / NET: -395 mL    06 Dec 2024 07:01  -  07 Dec 2024 02:27  --------------------------------------------------------  IN: 1560 mL / OUT: 950 mL / NET: 610 mL      PHYSICAL EXAM:  General: patient seen laying supine in bed in NAD  Neuro: OEs to noxious, A&Ox0, does not follow commands, R hand spontanous movement in fingers, RLE withdraws to noxious, LLE  withdrawal to noxious, LUE 0/5  HEENT: PERRL, only tracks vertically, +trach collar  Neck: supple  Cardiac: RRR, S1S2  Pulmonary: chest rise symmetric  Abdomen: soft, nontender, nondistended, +PEG  Ext: perfusing well  Skin: warm, dry    TUBES/LINES:  [] CVC  [] A-line  [] Lumbar Drain  [] Ventriculostomy  [x] Tracheostomy  [x] PEG    DIET:  [] NPO  [] Mechanical  [x] Tube feeds    LABS:    Urinalysis Basic - ( 06 Dec 2024 05:56 )    Color: x / Appearance: x / SG: x / pH: x  Gluc: 108 mg/dL / Ketone: x  / Bili: x / Urobili: x   Blood: x / Protein: x / Nitrite: x   Leuk Esterase: x / RBC: x / WBC x   Sq Epi: x / Non Sq Epi: x / Bacteria: x    CAPILLARY BLOOD GLUCOSE      Drug Levels: [] N/A    CSF Analysis: [] N/A      Allergies    Allergy Status Unknown    Intolerances      MEDICATIONS:  Antibiotics:    Neuro:  acetaminophen   Oral Liquid .. 650 milliGRAM(s) Oral every 6 hours PRN    Anticoagulation:  heparin   Injectable 5000 Unit(s) SubCutaneous every 8 hours    OTHER:  acetylcysteine 10%  Inhalation 4 milliLiter(s) Inhalation every 8 hours  albuterol/ipratropium for Nebulization 3 milliLiter(s) Nebulizer every 8 hours  artificial tears (preservative free) Ophthalmic Solution 1 Drop(s) Right EYE every 4 hours  chlorhexidine 2% Cloths 1 Application(s) Topical <User Schedule>  doxazosin 8 milliGRAM(s) Oral at bedtime  erythromycin   Ointment 1 Application(s) Right EYE at bedtime  ofloxacin 0.3% Solution 1 Drop(s) Right EYE four times a day  petrolatum Ophthalmic Ointment 1 Application(s) Both EYES two times a day  polyethylene glycol 3350 17 Gram(s) Oral daily  senna 2 Tablet(s) Oral at bedtime    IVF:    CULTURES:  Culture Results:   Mixed gram negative rods including Moderate Stenotrophomonas maltophilia  Commensal iram consistent with body site ( @ 00:19)    RADIOLOGY & ADDITIONAL TESTS:      ASSESSMENT:  43 y/o PMH ?stroke/MI present to [facility] after collapsing at work. Decorticate posturing, vomiting, intubated for airway protection. 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 Transferred to [facility] for further management. s/p trach 10/14. s/p peg 10/21. Re-upgrade to ICU 2/2 desaturation event and suctioning requirements 11/15.     PLAN:  Neuro:  - neuro/vitals q4h, protected sleep time 10PM-5AM  - pain control: tylenol prn  - vEEG (10/3-)- negative, (10/17-10/19) - negative  - CTH : enlarged pontine hemorrhage, CTH 10/3: stable, CTH 10/25: mostly resolved pontine hemorrhage  - MRI brain 10/12: parenchymal hemorrhage, acute/subacute R cerebellar stroke    - stroke core measures, stroke neuro signed off    CV:  - -160  - HTN: hold amlodipine 10mg, hold lisinopril 5mg   - echo () EF 75%    Resp:  - trach collar   - Secretions: duonebs/mucomyst/chest PT q8h   - CTA chest  negative for PE, inc ground glass opacities    GI:  - TF via PEG (placed 10/21 by gen surg)  - bowel regimen, last BM   - CTAP  negative for infection    - FOBT negative     Renal:  - IVL  - FW flushes 250cc q6hrs for uptrending BUN/Cr  - Urinary retenion: Gabriel replaced   - CKD: trend BUN/Cr  - renal US 10/1: echogenicity c/w chronic med renal dz, repeat 10/8: inc renal echogeicity, c/f medical renal disease w increased hydro     Endo:  - A1c 5.4    Heme:  - DVT ppx: SCDs, SQH 5000u q8h   - LE dopplers negative     ID:  - febrile, last pancx , MRSA swab neg    -  s/p Stenotrophomonas maltophilia PNA: s/p Bactrim (-) s/p Cefepime (-)  - 11/3, (+) sputum for stenotrophomas maltophlia, blood cx (+) klebsiella, cefepime 2gq12 ( - )   - empiric tx: s/p zosyn (11/3-) , s/p vanc  (11/3- )  - S/p Ancef (10/4-10/14) for PNA, and s/p Unasyn (10/18-10/23) +actinobacter baumanii     MISC:  - ophtho consult for keratitis  - erythromycin ointment to rt eye q4hrs, ofloxacin ointment to rt eye QID, artificial tears to rt eye q2hrs, moisture chamber at bedtime    Dispo: tele status, full code, pending placement and guardianship hearing     D/w Dr. D'Amico

## 2024-12-08 PROCEDURE — 99232 SBSQ HOSP IP/OBS MODERATE 35: CPT

## 2024-12-08 PROCEDURE — 99233 SBSQ HOSP IP/OBS HIGH 50: CPT

## 2024-12-08 RX ORDER — AMLODIPINE BESYLATE 10 MG/1
5 TABLET ORAL DAILY
Refills: 0 | Status: DISCONTINUED | OUTPATIENT
Start: 2024-12-08 | End: 2024-12-15

## 2024-12-08 RX ADMIN — IPRATROPIUM BROMIDE AND ALBUTEROL SULFATE 3 MILLILITER(S): .5; 2.5 SOLUTION RESPIRATORY (INHALATION) at 05:38

## 2024-12-08 RX ADMIN — POLYETHYLENE GLYCOL 3350 17 GRAM(S): 17 POWDER, FOR SOLUTION ORAL at 11:29

## 2024-12-08 RX ADMIN — Medication 1 APPLICATION(S): at 17:07

## 2024-12-08 RX ADMIN — Medication 2 TABLET(S): at 22:45

## 2024-12-08 RX ADMIN — OFLOXACIN 1 DROP(S): 3 SOLUTION OPHTHALMIC at 05:39

## 2024-12-08 RX ADMIN — Medication 1 DROP(S): at 02:11

## 2024-12-08 RX ADMIN — IPRATROPIUM BROMIDE AND ALBUTEROL SULFATE 3 MILLILITER(S): .5; 2.5 SOLUTION RESPIRATORY (INHALATION) at 22:45

## 2024-12-08 RX ADMIN — HEPARIN SODIUM 5000 UNIT(S): 1000 INJECTION INTRAVENOUS; SUBCUTANEOUS at 22:45

## 2024-12-08 RX ADMIN — OFLOXACIN 1 DROP(S): 3 SOLUTION OPHTHALMIC at 23:54

## 2024-12-08 RX ADMIN — DOXAZOSIN MESYLATE 8 MILLIGRAM(S): 8 TABLET ORAL at 22:45

## 2024-12-08 RX ADMIN — Medication 1 DROP(S): at 17:07

## 2024-12-08 RX ADMIN — Medication 1 DROP(S): at 05:39

## 2024-12-08 RX ADMIN — OFLOXACIN 1 DROP(S): 3 SOLUTION OPHTHALMIC at 11:29

## 2024-12-08 RX ADMIN — ACETYLCYSTEINE 4 MILLILITER(S): 200 INHALANT RESPIRATORY (INHALATION) at 22:45

## 2024-12-08 RX ADMIN — HEPARIN SODIUM 5000 UNIT(S): 1000 INJECTION INTRAVENOUS; SUBCUTANEOUS at 14:14

## 2024-12-08 RX ADMIN — Medication 1 DROP(S): at 14:17

## 2024-12-08 RX ADMIN — ACETYLCYSTEINE 4 MILLILITER(S): 200 INHALANT RESPIRATORY (INHALATION) at 14:16

## 2024-12-08 RX ADMIN — OFLOXACIN 1 DROP(S): 3 SOLUTION OPHTHALMIC at 17:06

## 2024-12-08 RX ADMIN — Medication 1 DROP(S): at 10:19

## 2024-12-08 RX ADMIN — Medication 1 DROP(S): at 23:19

## 2024-12-08 RX ADMIN — AMLODIPINE BESYLATE 5 MILLIGRAM(S): 10 TABLET ORAL at 11:29

## 2024-12-08 RX ADMIN — Medication 1 APPLICATION(S): at 06:11

## 2024-12-08 RX ADMIN — ACETYLCYSTEINE 4 MILLILITER(S): 200 INHALANT RESPIRATORY (INHALATION) at 05:39

## 2024-12-08 RX ADMIN — ERYTHROMYCIN 1 APPLICATION(S): 5 OINTMENT OPHTHALMIC at 22:45

## 2024-12-08 RX ADMIN — HEPARIN SODIUM 5000 UNIT(S): 1000 INJECTION INTRAVENOUS; SUBCUTANEOUS at 05:39

## 2024-12-08 RX ADMIN — IPRATROPIUM BROMIDE AND ALBUTEROL SULFATE 3 MILLILITER(S): .5; 2.5 SOLUTION RESPIRATORY (INHALATION) at 14:14

## 2024-12-08 NOTE — PROGRESS NOTE ADULT - SUBJECTIVE AND OBJECTIVE BOX
Patient is a 44y old  Male who presents with a chief complaint of brain stem bleed (08 Dec 2024 00:01)        SUBJECTIVE:  Patient was seen and examined at bedside, no clinical change    Overnight Events : hyydral IV x1 for hypertension 160s/110s, this AM 120s/90s    Last Bowel Movement: 06-Dec-2024 (12-06)  Last Bowel Movement: 30-Nov-2024 (12-05)  Last Bowel Movement: 30-Nov-2024 (12-04)  Last Bowel Movement: 30-Nov-2024 (12-04)  Last Bowel Movement: 30-Nov-2024 (12-03)  Last Bowel Movement: 30-Nov-2024 (12-03)  Last Bowel Movement: 30-Nov-2024 (12-02)  Last Bowel Movement: 30-Nov-2024 (12-01)      Review of systems: 12 point Review of systems negative unless otherwise documented elsewhere in note.     Diet, NPO with Tube Feed:   Tube Feeding Modality: Gastrostomy  [place] Renal Support 1.8  Total Volume for 24 Hours (mL): 1080  Total Number of Cans: 5  Continuous  Starting Tube Feed Rate mL per Hour: 45  Increase Tube Feed Rate by (mL): 10     Every 4 hours  Until Goal Tube Feed Rate (mL per Hour): 60  Tube Feed Duration (in Hours): 18  Tube Feed Start Time: 10:00  Tube Feed Stop Time: 04:00  Banatrol TF     Qty per Day:  2 (11-20-24 @ 10:26) [Active]      MEDICATIONS:  MEDICATIONS  (STANDING):  acetylcysteine 10%  Inhalation 4 milliLiter(s) Inhalation every 8 hours  albuterol/ipratropium for Nebulization 3 milliLiter(s) Nebulizer every 8 hours  artificial tears (preservative free) Ophthalmic Solution 1 Drop(s) Right EYE every 4 hours  chlorhexidine 2% Cloths 1 Application(s) Topical <User Schedule>  doxazosin 8 milliGRAM(s) Oral at bedtime  erythromycin   Ointment 1 Application(s) Right EYE at bedtime  heparin   Injectable 5000 Unit(s) SubCutaneous every 8 hours  ofloxacin 0.3% Solution 1 Drop(s) Right EYE four times a day  petrolatum Ophthalmic Ointment 1 Application(s) Both EYES two times a day  polyethylene glycol 3350 17 Gram(s) Oral daily  senna 2 Tablet(s) Oral at bedtime    MEDICATIONS  (PRN):  acetaminophen   Oral Liquid .. 650 milliGRAM(s) Oral every 6 hours PRN Temp greater or equal to 38.5C (101.3F), Mild Pain (1 - 3)      Allergies    Allergy Status Unknown    Intolerances        OBJECTIVE:  Vital Signs Last 24 Hrs  T(C): 36.6 (08 Dec 2024 08:55), Max: 36.8 (07 Dec 2024 21:02)  T(F): 97.8 (08 Dec 2024 08:55), Max: 98.3 (07 Dec 2024 21:02)  HR: 76 (08 Dec 2024 08:52) (64 - 84)  BP: 129/95 (08 Dec 2024 08:52) (122/91 - 148/106)  BP(mean): 108 (08 Dec 2024 08:52) (101 - 124)  RR: 18 (08 Dec 2024 08:52) (16 - 18)  SpO2: 98% (08 Dec 2024 08:52) (92% - 99%)    Parameters below as of 08 Dec 2024 08:52  Patient On (Oxygen Delivery Method): tracheostomy collar  O2 Flow (L/min): 10  O2 Concentration (%): 40  I&O's Summary    07 Dec 2024 07:01  -  08 Dec 2024 07:00  --------------------------------------------------------  IN: 1770 mL / OUT: 1560 mL / NET: 210 mL    08 Dec 2024 07:01  -  08 Dec 2024 10:07  --------------------------------------------------------  IN: 0 mL / OUT: 325 mL / NET: -325 mL        PHYSICAL EXAM:  General: awake, looking comfortable, no WOB on trach collar, not tracking horizontally today but tracking vertically  HEENT: tracheostomy clean  Lungs: poor inspiration, no crackles, no wheezes  Heart: regular  Abdomen: soft, no visible tenderness, + BS  Extremities: warm, no edema, not moving to command      LABS:                        12.0   8.13  )-----------( 197      ( 07 Dec 2024 06:32 )             37.2     12-07    140  |  104  |  58[H]  ----------------------------<  122[H]  3.7   |  26  |  1.28    Ca    9.4      07 Dec 2024 06:32  Phos  4.1     12-07  Mg     2.2     12-07          CAPILLARY BLOOD GLUCOSE        Urinalysis Basic - ( 07 Dec 2024 06:32 )    Color: x / Appearance: x / SG: x / pH: x  Gluc: 122 mg/dL / Ketone: x  / Bili: x / Urobili: x   Blood: x / Protein: x / Nitrite: x   Leuk Esterase: x / RBC: x / WBC x   Sq Epi: x / Non Sq Epi: x / Bacteria: x        MICRODATA:      RADIOLOGY/OTHER STUDIES:

## 2024-12-08 NOTE — PROGRESS NOTE ADULT - SUBJECTIVE AND OBJECTIVE BOX
HPI:  Unknown age male, unknown past medical history (reported stroke and MI by coworkers) presented to [facility] with AMS, Pt was working at [employer] when he was found down by coworkers. EMS called and pt brought to [facility] ED. Intubated, sedated, started on cardene for SBPs in 200s. CT head showed brain stem bleed. Transferred to NSICU for further management.  (30 Sep 2024 12:55)    S/Overnight events:  GOERGE overnight, neuro stable.      Hospital Course:   : transferred from [facility]. A line placed. Versed dc'd. Cy Rader at bedside, states pt has family in [place], cannot confirm medications or PMH other than stroke and MI. 250cc bolus 3% given. LR switched to NS. hydralazine 25q8 started, 3% started, switched propofol to precedex   10/1: stability CTH done. Added labetalol, started TF. Palliative consulted. ethics consulted to determine surrogate. febrile 103, pan cx sent  10/2: BD 2, GEORGE overnight. TF resumed. Desatt'd to 80s, FiO2 inc. to 50. Fentanyl given, ABG, CXR ordered. Maxxed on precedex, started on propofol for DARIEN -4 - -5. Precedex dc'd. Duonebs, mucomyst, hypertonic added. 3% dc'd. Cardene dc'd. Start vanc/CTX. Increased labetalol 200q8. MRSA negative, dc'd vanc. ETT pulled back 2cm x 2, good positioning after confirmatory chest xray. Ethics attempting to establish HCP with family. Na 159, starting FW 250q6 for range 150-155.   10/3: BD3, GEORGE o/n, neuro stable. Na elevating, FW increased to 300q6. Dc'd bowel reg for diarrhea. vEEG started. SQH 5000q8 tonight.   10/4: BD 4, albumn bolus, incr. LR to 80 2/2 incr. in Cr, LR to 100cc/hr for uptrending Cr. Started 7% hypersal for 48hrs and SL atropine for inline/oral thick secretions. Dc'd CTX and started ancef for MSSA in the sputum. Nephrology consulted for CKD, f/u recs. SBP 170s, given hydralazine 10mg IVP.   10/5: BD5, o/n 10mg IVP hydralazine given for SBP 170s and started on hydralazine 25q8 via OGT. 10mg IV push labetalol for SBP > 160s. RT placed for diarrhea.   10/6: BD6, o/n FW increased to 350q4 per nephrology recs. IV tylenol for temp 100.6, SBp 160s presumed uncomfortable.   10/7: BD7, overnight pancultured for temp 101.8F.   10/8: BD8. GEORGE. Cr bumped. decreased LR to 75cc/hr. Adding simethicone ATC. incr hydralazine 50mgTID. Incr labetalol 300mgTID. Na 145, decreased FWF to 250q6. Start precedex. FENa consistent with intrinsic kidney injury. Pend repeat renal US. Retaining up to 1.3L, bladder scans q6, straight cath PRN  10/9: BD 9. GEORGE overnight. Neuro stable. abd xray for distention w non-specific gas pattern, OGT to LIWS for morning. duonebs/mucomyst to q8 for improving secretions. Changed tube feeds to Jevity 1.5 20cc/hr, low rate due to abdominal distention, nepro dense and more difficult to digest. Tolerating CPAP, confirmed by ABG.   10/10: BD 10. GEORGE overnight. Neuro stable. (+) gabriel for urinary retention on bladder scan. inc TF to goal rate of 40cc/hr. family leaning toward pursuing trach/PEG. 1/2 amp for FS 81.   10/11: BD 11. GEORGE overnight. Neuro stable. Trach/PEG consults placed.   10/12: BD 12. GEORGE overnight. Neuro stable. MRI brain complete.   10/13: BD 13. Increase flomax. Hold SQH after PM dose for trach tm. IVL.   10/14: BD 14. GEORGE overnight, remains on AC/VC. Gabriel placed for urinary retention. Dc'd free water.  S/p trach with pulm. NGT placed and CXR confirmed in good position.   10/15: BD 15, GEORGE ovn. resumed feeds. spiked 101, pan cx sent.   10/16: BD 16. GEORGE ovn. Lokelma 5mg for K+ 5.4. Started vanc q 24/zosyn for empiric PNA coverage, IVF to 100/hr. PEG held for fever.   10/17: BD 17,  ordered serum osm and urine osm for am. Started sinemet for neurostimulation. Increased cardura to 0.8. Started FW 100q4, dc'd IVF. MRSA negative, dc'd vanc. NGT replaced d/t coiling.   10/18: BD 18, GEORGE overnight, neuro stable. Amantadine added for neurostim. zosyn changed to unasyn for acinetobacter baumannii, failed TOV and required SC  10/19: BD 19, GEORGE ovn. cardura 2mg added for retention. labetalol decreased 200q8, hydralazine decreased 25q8. Gabriel replaced.   10/20: BD20, GEORGE overnight. NGT dislodged, replaced. PEG tomorrow w/ gen surg, FW increased to 150q4 and labetalol decreased to 100q8, lokelma given for hyperkalemia.   10/21: BD 21. POD0 PEG placement with Gen surg. decr labetolol to 50q8, incr. cardura to 0.4, started lokelma and phoslo, dc gabriel POD0 PEG placement with Gen surg.  10/22: BD 22. Plan to start TF today via PEG. dc labetalol, Following ophtho recs. Increased apnea settings - found to be in cheyne-moe respiration. CPAP 5/5.  10/23: BD 23. hydralazine d/c'd, trach collar trial today. Rectal tube placed at 6am.  10/24: BD24, o/n lokelma held due to diarrhea. Free water 100q6 resumed. dc'd tamsulosin, amantadine. Incr'd cardura to 8mg qhs. Dc'd FW. Switched jevity to nepro. gabriel placed for high urine output. Started SL atropine for oral secretions. Dc'd free water.  10/25: BD25, o/n decreased suctioning requirements to > q4hrs, GEORGE. Cr improving, cont phoslo, lokelma held at this time. Gabriel placed yest, cont. Tolerating trach collar. Given 500cc plasmalyte bolus for ANIKA. Dc'd sinemet.   10/26: BD26, o/n resumed lokelma 5mg daily and resumed 100cc free water q6hrs. Change in neuro status with new right pupillary dilation with anisocoria (right pupil 6mm fixed and left pupil 3mm briskly reactive). Given 23.4% NaCl bullet, taken for emergent CTH showing mostly resolved pontine hemorrhage, continued brainstem hypodensity likely edema d/t hemorrhage, no new hemorrhage or infarct, no herniation, mild increase in size of left lateral ventricle. Vitals remaine stable. Na goal > 140.   10/27: BD27, o/n GEORGE.Neuro stable. Pend stepdown with airway bed.   10/28: BD 28. GEORGE overnight. Neuro stable. Miralax ordered. Gabriel removed, pending TOV.  10/29: BD 29. GEORGE o/n. Given 2L NS over 8 hrs for increased BUN/Cr ratio. Gabriel placed for frequent straight cath.   10/30: BD 30.   10/31: BD 31. GEORGE overnight. Na 149, increased free water to 200q6. 1L NS for uptrending BUN.   : BD 32. GEORGE overnight. Given 1L NS for dehydration. Na 146, increased FW to 250q6.   : BD 33 GEORGE overnight, neuro stable, given 500cc bolus for net negative status and tachycardia   11/3: BD 34, GEORGE overnight, neuro stable. Patient remains tachycardic, EKG showing sinus tachycardia, given additional 500cc NS bolus. Febrile to 101.9F, pan cultured (without UA), CXR WNL, given tylenol.   : BD 35, GEORGE overnight, neuro stable. Given 1L NS for tachycardia. sputum (+) for stenotropohomas maltophilia.   : BD 36 GEORGE overnight, neuro stable. Vancomycin dc'd. Chest PT BID. ID consulted, cont zosyn.  : BD 37. blood cx + klebsiella dc zosyn changed to cefepime, CTAP ordered, rpt blood cx sent.    : BD 38. Pending CT A/P, given 250cc bolus and starting maintenance fluids overnight. Pending CT A/P after bolus   : BD 39. CT CAP negative for infection.   : BD 40. GEORGE overnight.  11/10: BD 41. GEORGE overnight. desat to 85 on trach collar, O2 inc to 10L and 100%, O2 sat inc to 95. pt tachy to 110s, euvolemic. given tylenol. ABG and CXR ordered. spiked fever, pancultured, RVP negative. AM ABG w pO2 79, rpt w pO2 79. pt appears comfortable, satting 94%.   : BD 42. GEORGE overnight. pt became tachy to 130s, desat to 90 on 100% FiO2 and 10L. suctioned, (+) productive cough. temp 101.4, given 1g IV tylenol and 500cc NS bolus for euvolemia. fever and HR downtrending. LE dopplers negative for dvt  : BD 43, GEORGE ovn, fever and HR downtrending, satting 97% 70% FIO2  : BD 44, GEORGE ovn. started standing tylenol x24 hours for tachycardia. desat to 80s, o2 increased. CXR stable, pending CTA PE protocol.   : BD 45, GEORGE overnight, neuro stable. resp therapy dec FiO2 to 70%.   11/15: BD 46, GEORGE overnight, neuro stable.  Rapid called for desaturation 30s, tachycardic 140s. Patient bagged, 100% fio2, heavily suctioned. CXR/POCUS unremarkable. ABG c/w desaturation. WBC 14.71. Afebrile. O2 improved to 90s and patient upgraded to ICU. ABG paO2 30s improved to 89 on vent. IV Tylenol x 1, sputum sent. Start protonix while o-n vent.   : BD 47. POCUS showed collapsable IVCF, given 1L bolus. Vanco/Cefpime added empriic for PNA, NGT feeds restarted. MRSA swab neg, Vanc DC'd.   : BD 48. GEORGE overnight. 1l bolus for tachycardia. Spiked to 101, cultured. 500cc bolus for tachycardia, tachy to 148 given 25mcg fentanyl, 250cc albumin, 1.5L bolus. 5 IV lopressor with response HR to 100s. +Stenotrophomonas on sputum cx.   : BD 49. GEORGE overnight. Consulted ID, cefepime switched to bactrim x7days. Started hydrochlorothiazine 12.5mg daily.  : BD 50. Tachy 120s, given tylenol and 500cc NS. Tolerating 5/5, switched to TCx. Holding phos binder. D/c Bactrim. D/c gabriel, f/u TOV. Dc'd PPI.   : BD 51. GEORGE ovn. 1600 satting low 90s, mildly tachy to 110s, afebrile, RR wnl. O2 improved to mid 90s while inc O2 to 100% on TCx. CPAP 5/5 placed back on.  : BD 52, GEORGE ovn, tolerating CPAP 5/5. Switch to trach collar during the day if tolerating well. HCTZ held for Cr bump, straight cath frequence increased to q4  : BD 53, GEORGE ovn. Resumed phoslo. Gabriel placed. Resumed HCTZ.   : BD 54. Holding tylenol in setting of possible fever, will require pan cx if febrile. Cr improved today. Cont CPAP. Bowel regimen held i/s/o diarrhea. FOBT negative.  : BD 55. GEORGE overnight. Neuro stable. HCTZ dc'ed, started lisinopril 5. Lokelma dc'ed for K 3.7.   : BD 56, GEORGE overnight. Neuro stable. dc'd lisinopril 5mg. Gabriel dc'd. TOV. 1545 noted to be hypotensive, MAP 50, in supine position on chair, HR 60s, afebrile, O2 96%. Given 1L cc NS bolus, placed back on bed in reverse trendelenberg, improved to map of 66. Neostick at bedside. Vitals check q1h. Dc'ed amlodipine. Failed TOV, bladder scan q6, sc prn. Added back Senna.   : BD 57, GEORGE overnight. Neuro stable. Dc'd phoslo.   : BD 58, GEORGE overnight. Neuro stable.    : BD 59. Gabriel replaced.   : GEORGE.  : GEORGE, neuro stable.   : BD 62, GEORGE overnight  : BD 63, GEORGE overnight.; Given 1L bolus of LR for uptrending BUN/Cr.  12/3: BD 64. Reinstated eye gtt/moisture chamber given increased Rt eye injection  : BD 65. GEORGE overnight. Attempted to speak with ophtho regarding eyelid weight/closure but no answer, full mailbox.   : BD 66, GEORGE overnight, bowel regimen increased and had BM.   : BD 67, GEORGE overnight, neuro stable.  : GEORGE overnight, neuro stable. /110s, given x1 hydralazine 10 mg IVP.       Vital Signs Last 24 Hrs  T(C): 36.8 (07 Dec 2024 21:02), Max: 36.8 (07 Dec 2024 21:02)  T(F): 98.3 (07 Dec 2024 21:02), Max: 98.3 (07 Dec 2024 21:02)  HR: 84 (07 Dec 2024 23:42) (64 - 88)  BP: 129/95 (07 Dec 2024 23:42) (129/95 - 148/106)  BP(mean): 107 (07 Dec 2024 23:42) (105 - 124)  RR: 18 (07 Dec 2024 23:42) (14 - 18)  SpO2: 92% (07 Dec 2024 23:42) (89% - 99%)    Parameters below as of 07 Dec 2024 23:42  Patient On (Oxygen Delivery Method): tracheostomy collar  O2 Flow (L/min): 10  O2 Concentration (%): 40    I&O's Detail    06 Dec 2024 07:01  -  07 Dec 2024 07:00  --------------------------------------------------------  IN:    Enteral Tube Flush: 100 mL    Free Water: 750 mL    Miscellaneous Tube Feedin mL  Total IN: 1930 mL    OUT:    Indwelling Catheter - Urethral (mL): 1150 mL    Oral Fluid: 0 mL  Total OUT: 1150 mL    Total NET: 780 mL      07 Dec 2024 07:01  -  08 Dec 2024 00:01  --------------------------------------------------------  IN:    Free Water: 750 mL    Miscellaneous Tube Feedin mL  Total IN: 1590 mL    OUT:    Indwelling Catheter - Urethral (mL): 1160 mL    Oral Fluid: 0 mL  Total OUT: 1160 mL    Total NET: 430 mL        I&O's Summary    06 Dec 2024 07:01  -  07 Dec 2024 07:00  --------------------------------------------------------  IN: 1930 mL / OUT: 1150 mL / NET: 780 mL    07 Dec 2024 07:01  -  08 Dec 2024 00:01  --------------------------------------------------------  IN: 1590 mL / OUT: 1160 mL / NET: 430 mL        PHYSICAL EXAM:  General: patient seen laying supine in bed in NAD  Neuro: OEs to noxious, A&Ox0, does not follow commands, R hand spontanous movement in fingers, RLE withdraws to noxious, LLE  withdrawal to noxious, LUE 0/5  HEENT: PERRL, only tracks vertically, +trach collar  Neck: supple  Cardiac: RRR, S1S2  Pulmonary: chest rise symmetric  Abdomen: soft, nontender, nondistended, +PEG  Ext: perfusing well  Skin: warm, dry    TUBES/LINES:  [] CVC  [] A-line  [] Lumbar Drain  [] Ventriculostomy  [x] Tracheostomy  [x] PEG    DIET:  [] NPO  [] Mechanical  [x] Tube feeds    LABS:    Urinalysis Basic - ( 07 Dec 2024 06:32 )    Color: x / Appearance: x / SG: x / pH: x  Gluc: 122 mg/dL / Ketone: x  / Bili: x / Urobili: x   Blood: x / Protein: x / Nitrite: x   Leuk Esterase: x / RBC: x / WBC x   Sq Epi: x / Non Sq Epi: x / Bacteria: x          CAPILLARY BLOOD GLUCOSE          Drug Levels: [] N/A    CSF Analysis: [] N/A      Allergies    Allergy Status Unknown    Intolerances      MEDICATIONS:  Antibiotics:    Neuro:  acetaminophen   Oral Liquid .. 650 milliGRAM(s) Oral every 6 hours PRN    Anticoagulation:  heparin   Injectable 5000 Unit(s) SubCutaneous every 8 hours    OTHER:  acetylcysteine 10%  Inhalation 4 milliLiter(s) Inhalation every 8 hours  albuterol/ipratropium for Nebulization 3 milliLiter(s) Nebulizer every 8 hours  artificial tears (preservative free) Ophthalmic Solution 1 Drop(s) Right EYE every 4 hours  chlorhexidine 2% Cloths 1 Application(s) Topical <User Schedule>  doxazosin 8 milliGRAM(s) Oral at bedtime  erythromycin   Ointment 1 Application(s) Right EYE at bedtime  ofloxacin 0.3% Solution 1 Drop(s) Right EYE four times a day  petrolatum Ophthalmic Ointment 1 Application(s) Both EYES two times a day  polyethylene glycol 3350 17 Gram(s) Oral daily  senna 2 Tablet(s) Oral at bedtime    IVF:    CULTURES:  Culture Results:   Mixed gram negative rods including Moderate Stenotrophomonas maltophilia  Commensal iram consistent with body site ( @ 00:19)    RADIOLOGY & ADDITIONAL TESTS:      ASSESSMENT:  45 y/o PMH ?stroke/MI present to [facility] after collapsing at work. Decorticate posturing, vomiting, intubated for airway protection. 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 Transferred to [facility] for further management. s/p trach 10/14. s/p peg 10/21. Re-upgrade to ICU 2/2 desaturation event and suctioning requirements 11/15.     PLAN:  Neuro:  - neuro/vitals q4h, protected sleep time 10PM-5AM  - pain control: tylenol prn  - vEEG (10/3-)- negative, (10/17-10/19) - negative  - CTH : enlarged pontine hemorrhage, CTH 10/3: stable, CTH 10/25: mostly resolved pontine hemorrhage  - MRI brain 10/12: parenchymal hemorrhage, acute/subacute R cerebellar stroke    - stroke core measures, stroke neuro signed off    CV:  - -160  - HTN: hold amlodipine 10mg, hold lisinopril 5mg   - echo () EF 75%    Resp:  - trach collar   - Secretions: duonebs/mucomyst/chest PT q8h   - CTA chest  negative for PE, inc ground glass opacities    GI:  - TF via PEG (placed 10/21 by gen surg)  - bowel regimen, last BM   - CTAP  negative for infection    - FOBT negative     Renal:  - IVL  - FW flushes 250cc q6hrs for uptrending BUN/Cr  - Urinary retenion: Gabriel replaced   - CKD: trend BUN/Cr  - renal US 10/1: echogenicity c/w chronic med renal dz, repeat 10/8: inc renal echogeicity, c/f medical renal disease w increased hydro     Endo:  - A1c 5.4    Heme:  - DVT ppx: SCDs, SQH 5000u q8h   - LE dopplers negative     ID:  - febrile, last pancx , MRSA swab neg    -  s/p Stenotrophomonas maltophilia PNA: s/p Bactrim (-) s/p Cefepime (-)  - 11/3, (+) sputum for stenotrophomas maltophlia, blood cx (+) klebsiella, cefepime 2gq12 ( - )   - empiric tx: s/p zosyn (11/3-) , s/p vanc  (11/3- )  - S/p Ancef (10/4-10/14) for PNA, and s/p Unasyn (10/18-10/23) +actinobacter baumanii     MISC:  - ophtho consult for keratitis  - erythromycin ointment to rt eye q4hrs, ofloxacin ointment to rt eye QID, artificial tears to rt eye q2hrs, moisture chamber at bedtime    Dispo: tele status, full code, pending placement and guardianship hearing     D/w Dr. D'Amico

## 2024-12-08 NOTE — PROGRESS NOTE ADULT - ASSESSMENT
44M with unclear PMH (?stroke, MI) who was found down at work, intubated for airway protection and found to have acute parenchymal hemorrhage within nabil with mass effect (+ acute/subacute right cerebellar infarct) in setting of hypertensive emergency, transferred to [facility] for further neurosurgical care. Hospital course c/b poor neurologic recovery s/p trach-PEG, AUR s/p gabriel, ANIKA on CKD c/b hyperkalemia, HAP s/p amp-sulbactam (EOT 10/23), K aerogenes bacteremia  treated with 2 weeks of Cefepime per ID , On 11/15 he became septic and was transferred to NSICU due to increased o2 requirements and needed Vent support , Trach Cultures + for Stenotrophomonas which was treated with 7 days of Bactrim per ID , has been weaned of Vent since 11/23 and is now on 10lts at 40% o2 through Trach Collar     # Pontine hemorrhage  # Encephalopathy due to Intracranial Bleed   - Acute parenchymal hemorrhage within nabil with mass effect (+ acute/subacute right cerebellar infarct) in setting of hypertensive emergency.   - MRI brain also demonstrated acute/subacute R cerebellar stroke.   - No Neurosurgical Interventions were performed   - due to IPH and Cerebellar stroke patient has become Trach and Peg Dependent    # Hypertension  - restart amlodipine 5mg daily     # Acute kidney injury  # Acute Tubular Necrosis superimposed on CKD. 3  - Pt s/p US renal with chronic medical renal disease  - Elevated BUN , Not on any meds that can elevate BUN , Hgb Stable   - 1 Litre LR bolus on 12/1, Cr improved to 1.2, today 1.3s within ptn baseline range, ctm     # Chronic respiratory failure.   -Pt s/p percutaneous trach by pulm on 10/14/24  - Continue trach collar   - Continue routine trach care and suctioning PRN  - Chest PT      # Neurogenic dysphagia.   -Pt s/pPEG with surgery 10/21/24  - TF per nutrition  - aspiration precautions and elevation of HOB.    #Acute urinary retention.   - doxazosin 8mg  - Failure TOV on 11/25/24     # Functional quadriplegia in  setting of brainstem hemorrhage  - decub precautions  - care per nursing protocol     # DVT prop  -Continue Heparin SQ q8     # Dispo: can likely be stepped down to floors given clinical stability   Comment: Left proximal dorsal thumb (2019) Detail Level: Simple

## 2024-12-09 LAB
ANION GAP SERPL CALC-SCNC: 10 MMOL/L — SIGNIFICANT CHANGE UP (ref 5–17)
BUN SERPL-MCNC: 49 MG/DL — HIGH (ref 7–23)
CALCIUM SERPL-MCNC: 8.9 MG/DL — SIGNIFICANT CHANGE UP (ref 8.4–10.5)
CHLORIDE SERPL-SCNC: 103 MMOL/L — SIGNIFICANT CHANGE UP (ref 96–108)
CO2 SERPL-SCNC: 25 MMOL/L — SIGNIFICANT CHANGE UP (ref 22–31)
CREAT SERPL-MCNC: 1.14 MG/DL — SIGNIFICANT CHANGE UP (ref 0.5–1.3)
EGFR: 80 ML/MIN/1.73M2 — SIGNIFICANT CHANGE UP
EGFR: 80 ML/MIN/1.73M2 — SIGNIFICANT CHANGE UP
GLUCOSE SERPL-MCNC: 106 MG/DL — HIGH (ref 70–99)
HCT VFR BLD CALC: 35.9 % — LOW (ref 39–50)
HGB BLD-MCNC: 11.5 G/DL — LOW (ref 13–17)
MAGNESIUM SERPL-MCNC: 2.1 MG/DL — SIGNIFICANT CHANGE UP (ref 1.6–2.6)
MCHC RBC-ENTMCNC: 30.2 PG — SIGNIFICANT CHANGE UP (ref 27–34)
MCHC RBC-ENTMCNC: 32 G/DL — SIGNIFICANT CHANGE UP (ref 32–36)
MCV RBC AUTO: 94.2 FL — SIGNIFICANT CHANGE UP (ref 80–100)
NRBC # BLD: 0 /100 WBCS — SIGNIFICANT CHANGE UP (ref 0–0)
NRBC BLD-RTO: 0 /100 WBCS — SIGNIFICANT CHANGE UP (ref 0–0)
PHOSPHATE SERPL-MCNC: 4.1 MG/DL — SIGNIFICANT CHANGE UP (ref 2.5–4.5)
PLATELET # BLD AUTO: 209 K/UL — SIGNIFICANT CHANGE UP (ref 150–400)
POTASSIUM SERPL-MCNC: 3.8 MMOL/L — SIGNIFICANT CHANGE UP (ref 3.5–5.3)
POTASSIUM SERPL-SCNC: 3.8 MMOL/L — SIGNIFICANT CHANGE UP (ref 3.5–5.3)
RBC # BLD: 3.81 M/UL — LOW (ref 4.2–5.8)
RBC # FLD: 14.5 % — SIGNIFICANT CHANGE UP (ref 10.3–14.5)
SODIUM SERPL-SCNC: 138 MMOL/L — SIGNIFICANT CHANGE UP (ref 135–145)
WBC # BLD: 7.49 K/UL — SIGNIFICANT CHANGE UP (ref 3.8–10.5)
WBC # FLD AUTO: 7.49 K/UL — SIGNIFICANT CHANGE UP (ref 3.8–10.5)

## 2024-12-09 PROCEDURE — 99232 SBSQ HOSP IP/OBS MODERATE 35: CPT

## 2024-12-09 RX ORDER — HEPARIN SODIUM 1000 [USP'U]/ML
5000 INJECTION INTRAVENOUS; SUBCUTANEOUS EVERY 8 HOURS
Refills: 0 | Status: DISCONTINUED | OUTPATIENT
Start: 2024-12-09 | End: 2025-03-13

## 2024-12-09 RX ADMIN — DOXAZOSIN MESYLATE 8 MILLIGRAM(S): 8 TABLET ORAL at 22:42

## 2024-12-09 RX ADMIN — ACETYLCYSTEINE 4 MILLILITER(S): 200 INHALANT RESPIRATORY (INHALATION) at 13:54

## 2024-12-09 RX ADMIN — HEPARIN SODIUM 5000 UNIT(S): 1000 INJECTION INTRAVENOUS; SUBCUTANEOUS at 05:41

## 2024-12-09 RX ADMIN — Medication 1 APPLICATION(S): at 05:42

## 2024-12-09 RX ADMIN — OFLOXACIN 1 DROP(S): 3 SOLUTION OPHTHALMIC at 19:58

## 2024-12-09 RX ADMIN — IPRATROPIUM BROMIDE AND ALBUTEROL SULFATE 3 MILLILITER(S): .5; 2.5 SOLUTION RESPIRATORY (INHALATION) at 05:40

## 2024-12-09 RX ADMIN — Medication 1 DROP(S): at 10:58

## 2024-12-09 RX ADMIN — Medication 1 DROP(S): at 23:56

## 2024-12-09 RX ADMIN — IPRATROPIUM BROMIDE AND ALBUTEROL SULFATE 3 MILLILITER(S): .5; 2.5 SOLUTION RESPIRATORY (INHALATION) at 13:02

## 2024-12-09 RX ADMIN — OFLOXACIN 1 DROP(S): 3 SOLUTION OPHTHALMIC at 23:54

## 2024-12-09 RX ADMIN — ACETYLCYSTEINE 4 MILLILITER(S): 200 INHALANT RESPIRATORY (INHALATION) at 22:42

## 2024-12-09 RX ADMIN — IPRATROPIUM BROMIDE AND ALBUTEROL SULFATE 3 MILLILITER(S): .5; 2.5 SOLUTION RESPIRATORY (INHALATION) at 22:41

## 2024-12-09 RX ADMIN — OFLOXACIN 1 DROP(S): 3 SOLUTION OPHTHALMIC at 11:00

## 2024-12-09 RX ADMIN — Medication 1 DROP(S): at 05:42

## 2024-12-09 RX ADMIN — HEPARIN SODIUM 5000 UNIT(S): 1000 INJECTION INTRAVENOUS; SUBCUTANEOUS at 13:02

## 2024-12-09 RX ADMIN — Medication 1 DROP(S): at 03:34

## 2024-12-09 RX ADMIN — Medication 1 APPLICATION(S): at 05:43

## 2024-12-09 RX ADMIN — ACETYLCYSTEINE 4 MILLILITER(S): 200 INHALANT RESPIRATORY (INHALATION) at 05:41

## 2024-12-09 RX ADMIN — POLYETHYLENE GLYCOL 3350 17 GRAM(S): 17 POWDER, FOR SOLUTION ORAL at 11:05

## 2024-12-09 RX ADMIN — AMLODIPINE BESYLATE 5 MILLIGRAM(S): 10 TABLET ORAL at 05:41

## 2024-12-09 RX ADMIN — OFLOXACIN 1 DROP(S): 3 SOLUTION OPHTHALMIC at 05:40

## 2024-12-09 RX ADMIN — Medication 20 MILLIEQUIVALENT(S): at 07:52

## 2024-12-09 RX ADMIN — ERYTHROMYCIN 1 APPLICATION(S): 5 OINTMENT OPHTHALMIC at 22:42

## 2024-12-09 RX ADMIN — Medication 1 APPLICATION(S): at 20:00

## 2024-12-09 RX ADMIN — Medication 1 DROP(S): at 19:58

## 2024-12-09 RX ADMIN — Medication 2 TABLET(S): at 22:42

## 2024-12-09 RX ADMIN — Medication 1 DROP(S): at 13:02

## 2024-12-09 RX ADMIN — HEPARIN SODIUM 5000 UNIT(S): 1000 INJECTION INTRAVENOUS; SUBCUTANEOUS at 22:41

## 2024-12-09 NOTE — PROGRESS NOTE ADULT - SUBJECTIVE AND OBJECTIVE BOX
HPI: Unknown age male, unknown past medical history (reported stroke and MI by coworkers) presented to [facility] with AMS, Pt was working at [employer] when he was found down by coworkers. EMS called and pt brought to [facility] ED. Intubated, sedated, started on cardene for SBPs in 200s. CT head showed brain stem bleed. Transferred to NSICU for further management.  (30 Sep 2024 12:55)    S/Overnight events:  GEORGE overnight, neuro stable.        Hospital Course:   : transferred from [facility]. A line placed. Versed dc'd. Cy Rader at bedside, states pt has family in [place], cannot confirm medications or PMH other than stroke and MI. 250cc bolus 3% given. LR switched to NS. hydralazine 25q8 started, 3% started, switched propofol to precedex   10/1: stability CTH done. Added labetalol, started TF. Palliative consulted. ethics consulted to determine surrogate. febrile 103, pan cx sent  10/2: BD 2, GEORGE overnight. TF resumed. Desatt'd to 80s, FiO2 inc. to 50. Fentanyl given, ABG, CXR ordered. Maxxed on precedex, started on propofol for DARIEN -4 - -5. Precedex dc'd. Duonebs, mucomyst, hypertonic added. 3% dc'd. Cardene dc'd. Start vanc/CTX. Increased labetalol 200q8. MRSA negative, dc'd vanc. ETT pulled back 2cm x 2, good positioning after confirmatory chest xray. Ethics attempting to establish HCP with family. Na 159, starting FW 250q6 for range 150-155.   10/3: BD3, GEORGE o/n, neuro stable. Na elevating, FW increased to 300q6. Dc'd bowel reg for diarrhea. vEEG started. SQH 5000q8 tonight.   10/4: BD 4, albumn bolus, incr. LR to 80 2/2 incr. in Cr, LR to 100cc/hr for uptrending Cr. Started 7% hypersal for 48hrs and SL atropine for inline/oral thick secretions. Dc'd CTX and started ancef for MSSA in the sputum. Nephrology consulted for CKD, f/u recs. SBP 170s, given hydralazine 10mg IVP.   10/5: BD5, o/n 10mg IVP hydralazine given for SBP 170s and started on hydralazine 25q8 via OGT. 10mg IV push labetalol for SBP > 160s. RT placed for diarrhea.   10/6: BD6, o/n FW increased to 350q4 per nephrology recs. IV tylenol for temp 100.6, SBp 160s presumed uncomfortable.   10/7: BD7, overnight pancultured for temp 101.8F.   10/8: BD8. GEORGE. Cr bumped. decreased LR to 75cc/hr. Adding simethicone ATC. incr hydralazine 50mgTID. Incr labetalol 300mgTID. Na 145, decreased FWF to 250q6. Start precedex. FENa consistent with intrinsic kidney injury. Pend repeat renal US. Retaining up to 1.3L, bladder scans q6, straight cath PRN  10/9: BD 9. GEORGE overnight. Neuro stable. abd xray for distention w non-specific gas pattern, OGT to LIWS for morning. duonebs/mucomyst to q8 for improving secretions. Changed tube feeds to Jevity 1.5 20cc/hr, low rate due to abdominal distention, nepro dense and more difficult to digest. Tolerating CPAP, confirmed by ABG.   10/10: BD 10. GEORGE overnight. Neuro stable. (+) gabriel for urinary retention on bladder scan. inc TF to goal rate of 40cc/hr. family leaning toward pursuing trach/PEG. 1/2 amp for FS 81.   10/11: BD 11. GEORGE overnight. Neuro stable. Trach/PEG consults placed.   10/12: BD 12. GEORGE overnight. Neuro stable. MRI brain complete.   10/13: BD 13. Increase flomax. Hold SQH after PM dose for trach tm. IVL.   10/14: BD 14. GEORGE overnight, remains on AC/VC. Gabriel placed for urinary retention. Dc'd free water.  S/p trach with pulm. NGT placed and CXR confirmed in good position.   10/15: BD 15, GEORGE ovn. resumed feeds. spiked 101, pan cx sent.   10/16: BD 16. GEORGE ovn. Lokelma 5mg for K+ 5.4. Started vanc q 24/zosyn for empiric PNA coverage, IVF to 100/hr. PEG held for fever.   10/17: BD 17,  ordered serum osm and urine osm for am. Started sinemet for neurostimulation. Increased cardura to 0.8. Started FW 100q4, dc'd IVF. MRSA negative, dc'd vanc. NGT replaced d/t coiling.   10/18: BD 18, GEORGE overnight, neuro stable. Amantadine added for neurostim. zosyn changed to unasyn for acinetobacter baumannii, failed TOV and required SC  10/19: BD 19, GEORGE ovn. cardura 2mg added for retention. labetalol decreased 200q8, hydralazine decreased 25q8. Gabriel replaced.   10/20: BD20, GEORGE overnight. NGT dislodged, replaced. PEG tomorrow w/ gen surg, FW increased to 150q4 and labetalol decreased to 100q8, lokelma given for hyperkalemia.   10/21: BD 21. POD0 PEG placement with Gen surg. decr labetolol to 50q8, incr. cardura to 0.4, started lokelma and phoslo, dc gabriel POD0 PEG placement with Gen surg.  10/22: BD 22. Plan to start TF today via PEG. dc labetalol, Following ophtho recs. Increased apnea settings - found to be in cheyne-moe respiration. CPAP 5/5.  10/23: BD 23. hydralazine d/c'd, trach collar trial today. Rectal tube placed at 6am.  10/24: BD24, o/n lokelma held due to diarrhea. Free water 100q6 resumed. dc'd tamsulosin, amantadine. Incr'd cardura to 8mg qhs. Dc'd FW. Switched jevity to nepro. gabriel placed for high urine output. Started SL atropine for oral secretions. Dc'd free water.  10/25: BD25, o/n decreased suctioning requirements to > q4hrs, GEORGE. Cr improving, cont phoslo, lokelma held at this time. Gabriel placed yest, cont. Tolerating trach collar. Given 500cc plasmalyte bolus for ANIKA. Dc'd sinemet.   10/26: BD26, o/n resumed lokelma 5mg daily and resumed 100cc free water q6hrs. Change in neuro status with new right pupillary dilation with anisocoria (right pupil 6mm fixed and left pupil 3mm briskly reactive). Given 23.4% NaCl bullet, taken for emergent CTH showing mostly resolved pontine hemorrhage, continued brainstem hypodensity likely edema d/t hemorrhage, no new hemorrhage or infarct, no herniation, mild increase in size of left lateral ventricle. Vitals remaine stable. Na goal > 140.   10/27: BD27, o/n GEORGE.Neuro stable. Pend stepdown with airway bed.   10/28: BD 28. GEORGE overnight. Neuro stable. Miralax ordered. Gabriel removed, pending TOV.  10/29: BD 29. GEORGE o/n. Given 2L NS over 8 hrs for increased BUN/Cr ratio. Gabriel placed for frequent straight cath.   10/30: BD 30.   10/31: BD 31. GEORGE overnight. Na 149, increased free water to 200q6. 1L NS for uptrending BUN.   : BD 32. GEORGE overnight. Given 1L NS for dehydration. Na 146, increased FW to 250q6.   : BD 33 GEORGE overnight, neuro stable, given 500cc bolus for net negative status and tachycardia   11/3: BD 34, GEORGE overnight, neuro stable. Patient remains tachycardic, EKG showing sinus tachycardia, given additional 500cc NS bolus. Febrile to 101.9F, pan cultured (without UA), CXR WNL, given tylenol.   : BD 35, GEORGE overnight, neuro stable. Given 1L NS for tachycardia. sputum (+) for stenotropohomas maltophilia.   : BD 36 GEORGE overnight, neuro stable. Vancomycin dc'd. Chest PT BID. ID consulted, cont zosyn.  : BD 37. blood cx + klebsiella dc zosyn changed to cefepime, CTAP ordered, rpt blood cx sent.    : BD 38. Pending CT A/P, given 250cc bolus and starting maintenance fluids overnight. Pending CT A/P after bolus   : BD 39. CT CAP negative for infection.   : BD 40. GEORGE overnight.  11/10: BD 41. GEORGE overnight. desat to 85 on trach collar, O2 inc to 10L and 100%, O2 sat inc to 95. pt tachy to 110s, euvolemic. given tylenol. ABG and CXR ordered. spiked fever, pancultured, RVP negative. AM ABG w pO2 79, rpt w pO2 79. pt appears comfortable, satting 94%.   : BD 42. GEORGE overnight. pt became tachy to 130s, desat to 90 on 100% FiO2 and 10L. suctioned, (+) productive cough. temp 101.4, given 1g IV tylenol and 500cc NS bolus for euvolemia. fever and HR downtrending. LE dopplers negative for dvt  : BD 43, GEORGE ovn, fever and HR downtrending, satting 97% 70% FIO2  : BD 44, GEORGE ovn. started standing tylenol x24 hours for tachycardia. desat to 80s, o2 increased. CXR stable, pending CTA PE protocol.   : BD 45, GEORGE overnight, neuro stable. resp therapy dec FiO2 to 70%.   11/15: BD 46, GEORGE overnight, neuro stable.  Rapid called for desaturation 30s, tachycardic 140s. Patient bagged, 100% fio2, heavily suctioned. CXR/POCUS unremarkable. ABG c/w desaturation. WBC 14.71. Afebrile. O2 improved to 90s and patient upgraded to ICU. ABG paO2 30s improved to 89 on vent. IV Tylenol x 1, sputum sent. Start protonix while o-n vent.   : BD 47. POCUS showed collapsable IVCF, given 1L bolus. Vanco/Cefpime added empriic for PNA, NGT feeds restarted. MRSA swab neg, Vanc DC'd.   : BD 48. GEORGE overnight. 1l bolus for tachycardia. Spiked to 101, cultured. 500cc bolus for tachycardia, tachy to 148 given 25mcg fentanyl, 250cc albumin, 1.5L bolus. 5 IV lopressor with response HR to 100s. +Stenotrophomonas on sputum cx.   : BD 49. GEORGE overnight. Consulted ID, cefepime switched to bactrim x7days. Started hydrochlorothiazine 12.5mg daily.  : BD 50. Tachy 120s, given tylenol and 500cc NS. Tolerating 5/5, switched to TCx. Holding phos binder. D/c Bactrim. D/c gabriel, f/u TOV. Dc'd PPI.   : BD 51. GEORGE ovn. 1600 satting low 90s, mildly tachy to 110s, afebrile, RR wnl. O2 improved to mid 90s while inc O2 to 100% on TCx. CPAP 5/5 placed back on.  : BD 52, GEORGE ovn, tolerating CPAP 5/5. Switch to trach collar during the day if tolerating well. HCTZ held for Cr bump, straight cath frequence increased to q4  : BD 53, GEORGE ovn. Resumed phoslo. Gabriel placed. Resumed HCTZ.   : BD 54. Holding tylenol in setting of possible fever, will require pan cx if febrile. Cr improved today. Cont CPAP. Bowel regimen held i/s/o diarrhea. FOBT negative.  : BD 55. GEORGE overnight. Neuro stable. HCTZ dc'ed, started lisinopril 5. Lokelma dc'ed for K 3.7.   : BD 56, GEORGE overnight. Neuro stable. dc'd lisinopril 5mg. Gabriel dc'd. TOV. 1545 noted to be hypotensive, MAP 50, in supine position on chair, HR 60s, afebrile, O2 96%. Given 1L cc NS bolus, placed back on bed in reverse trendelenberg, improved to map of 66. Neostick at bedside. Vitals check q1h. Dc'ed amlodipine. Failed TOV, bladder scan q6, sc prn. Added back Senna.   : BD 57, GEORGE overnight. Neuro stable. Dc'd phoslo.   : BD 58, GEORGE overnight. Neuro stable.    : BD 59. Gabriel replaced.   : GEORGE.  : GEORGE, neuro stable.   : BD 62, GEORGE overnight  : BD 63, GEORGE overnight.; Given 1L bolus of LR for uptrending BUN/Cr.  12/3: BD 64. Reinstated eye gtt/moisture chamber given increased Rt eye injection  : BD 65. GEORGE overnight. Attempted to speak with ophtho regarding eyelid weight/closure but no answer, full mailbox.   : BD 66, GEORGE overnight, bowel regimen increased and had BM.   : BD 67, GEORGE overnight, neuro stable.  : GEORGE overnight, neuro stable. /110s, given x1 hydralazine 10 mg IVP. Restarted home amlodipine 5mg.        Vital Signs Last 24 Hrs  T(C): 36.8 (08 Dec 2024 22:00), Max: 36.8 (08 Dec 2024 14:00)  T(F): 98.3 (08 Dec 2024 22:00), Max: 98.3 (08 Dec 2024 14:00)  HR: 74 (08 Dec 2024 23:10) (70 - 78)  BP: 152/93 (08 Dec 2024 23:10) (122/91 - 152/93)  BP(mean): 113 (08 Dec 2024 23:10) (101 - 118)  RR: 15 (08 Dec 2024 23:10) (15 - 18)  SpO2: 97% (08 Dec 2024 23:10) (95% - 98%)    Parameters below as of 08 Dec 2024 23:10  Patient On (Oxygen Delivery Method): tracheostomy collar  O2 Flow (L/min): 10  O2 Concentration (%): 40    I&O's Detail    07 Dec 2024 07:01  -  08 Dec 2024 07:00  --------------------------------------------------------  IN:    Free Water: 750 mL    Miscellaneous Tube Feedin mL  Total IN: 1770 mL    OUT:    Indwelling Catheter - Urethral (mL): 1560 mL    Oral Fluid: 0 mL  Total OUT: 1560 mL    Total NET: 210 mL      08 Dec 2024 07:01  -  09 Dec 2024 00:10  --------------------------------------------------------  IN:    Enteral Tube Flush: 120 mL    Free Water: 500 mL    Miscellaneous Tube Feedin mL  Total IN: 1520 mL    OUT:    Indwelling Catheter - Urethral (mL): 700 mL  Total OUT: 700 mL    Total NET: 820 mL        I&O's Summary    07 Dec 2024 07:01  -  08 Dec 2024 07:00  --------------------------------------------------------  IN: 1770 mL / OUT: 1560 mL / NET: 210 mL    08 Dec 2024 07:01  -  09 Dec 2024 00:10  --------------------------------------------------------  IN: 1520 mL / OUT: 700 mL / NET: 820 mL        PHYSICAL EXAM:  General: patient seen laying supine in bed in NAD  Neuro: OEs to noxious, A&Ox0, does not follow commands, R hand spontanous movement in fingers, RLE withdraws to noxious, LLE withdrawal to noxious, LUE 0/5  HEENT: PERRL, only tracks vertically, +trach collar  Neck: supple  Cardiac: RRR, S1S2  Pulmonary: chest rise symmetric  Abdomen: soft, nontender, nondistended, +PEG  Ext: perfusing well  Skin: warm, dry    TUBES/LINES:  [] CVC  [] A-line  [] Lumbar Drain  [] Ventriculostomy  [x] Tracheostomy  [x] PEG    DIET:  [] NPO  [] Mechanical  [x] Tube feeds    LABS:    Urinalysis Basic - ( 07 Dec 2024 06:32 )    Color: x / Appearance: x / SG: x / pH: x  Gluc: 122 mg/dL / Ketone: x  / Bili: x / Urobili: x   Blood: x / Protein: x / Nitrite: x   Leuk Esterase: x / RBC: x / WBC x   Sq Epi: x / Non Sq Epi: x / Bacteria: x          CAPILLARY BLOOD GLUCOSE          Drug Levels: [] N/A    CSF Analysis: [] N/A      Allergies    Allergy Status Unknown    Intolerances      MEDICATIONS:  Antibiotics:    Neuro:  acetaminophen   Oral Liquid .. 650 milliGRAM(s) Oral every 6 hours PRN    Anticoagulation:  heparin   Injectable 5000 Unit(s) SubCutaneous every 8 hours    OTHER:  acetylcysteine 10%  Inhalation 4 milliLiter(s) Inhalation every 8 hours  albuterol/ipratropium for Nebulization 3 milliLiter(s) Nebulizer every 8 hours  amLODIPine   Tablet 5 milliGRAM(s) Oral daily  artificial tears (preservative free) Ophthalmic Solution 1 Drop(s) Right EYE every 4 hours  chlorhexidine 2% Cloths 1 Application(s) Topical <User Schedule>  doxazosin 8 milliGRAM(s) Oral at bedtime  erythromycin   Ointment 1 Application(s) Right EYE at bedtime  ofloxacin 0.3% Solution 1 Drop(s) Right EYE four times a day  petrolatum Ophthalmic Ointment 1 Application(s) Both EYES two times a day  polyethylene glycol 3350 17 Gram(s) Oral daily  senna 2 Tablet(s) Oral at bedtime    IVF:    CULTURES:  Culture Results:   Mixed gram negative rods including Moderate Stenotrophomonas maltophilia  Commensal iram consistent with body site ( @ 00:19)    RADIOLOGY & ADDITIONAL TESTS:      ASSESSMENT:  43 y/o PMH ?stroke/MI present to [facility] after collapsing at work. Decorticate posturing, vomiting, intubated for airway protection. 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 Transferred to [facility] for further management. s/p trach 10/14. s/p peg 10/21. Re-upgrade to ICU 2/2 desaturation event and suctioning requirements 11/15.     PLAN:  Neuro:  - neuro/vitals q4h, protected sleep time 10PM-5AM  - pain control: tylenol prn  - vEEG (10/3-)- negative, (10/17-10/19) - negative  - CTH : enlarged pontine hemorrhage, CTH 10/3: stable, CTH 10/25: mostly resolved pontine hemorrhage  - MRI brain 10/12: parenchymal hemorrhage, acute/subacute R cerebellar stroke    - stroke core measures, stroke neuro signed off    CV:  - -160  - HTN: amlodipine 10mg, hold lisinopril 5mg   - echo () EF 75%    Resp:  - trach collar   - Secretions: duonebs/mucomyst/chest PT q8h   - CTA chest  negative for PE, inc ground glass opacities    GI:  - TF via PEG (placed 10/21 by gen surg)  - bowel regimen, last BM   - CTAP  negative for infection    - FOBT negative     Renal:  - IVL  - FW flushes 250cc q6hrs for uptrending BUN/Cr  - Urinary retenion: Gabriel replaced   - CKD: trend BUN/Cr  - renal US 10/1: echogenicity c/w chronic med renal dz, repeat 10/8: inc renal echogeicity, c/f medical renal disease w increased hydro     Endo:  - A1c 5.4    Heme:  - DVT ppx: SCDs, SQH 5000u q8h   - LE dopplers negative     ID:  - febrile, last pancx , MRSA swab neg    -  s/p Stenotrophomonas maltophilia PNA: s/p Bactrim (-) s/p Cefepime (-)  - 11/3, (+) sputum for stenotrophomas maltophlia, blood cx (+) klebsiella, cefepime 2gq12 ( - )   - empiric tx: s/p zosyn (11/3-) , s/p vanc  (11/3- )  - S/p Ancef (10/4-10/14) for PNA, and s/p Unasyn (10/18-10/23) +actinobacter baumanii     MISC:  - ophtho consult for keratitis  - erythromycin ointment to rt eye q4hrs, ofloxacin ointment to rt eye QID, artificial tears to rt eye q2hrs, moisture chamber at bedtime    Dispo: tele status, full code, pending placement and guardianship hearing     D/w Dr. D'Amico

## 2024-12-09 NOTE — PROGRESS NOTE ADULT - ASSESSMENT
44M with unclear PMH (?stroke, MI) who was found down at work, intubated for airway protection and found to have acute parenchymal hemorrhage within nabil with mass effect (+ acute/subacute right cerebellar infarct) in setting of hypertensive emergency, transferred to [facility] for further neurosurgical care. Hospital course c/b poor neurologic recovery s/p trach-PEG, AUR s/p gabriel, ANIKA on CKD c/b hyperkalemia, HAP s/p amp-sulbactam (EOT 10/23), K aerogenes bacteremia  treated with 2 weeks of Cefepime per ID , On 11/15 he became septic and was transferred to NSICU due to increased o2 requirements and needed Vent support , Trach Cultures + for Stenotrophomonas which was treated with 7 days of Bactrim per ID , has been weaned of Vent since 11/23 and is now on 10lts at 40% o2 through Trach Collar     # Pontine hemorrhage  # Encephalopathy due to Intracranial Bleed   - Acute parenchymal hemorrhage within nabil with mass effect (+ acute/subacute right cerebellar infarct) in setting of hypertensive emergency.   - MRI brain also demonstrated acute/subacute R cerebellar stroke.   - No Neurosurgical Interventions were performed   - due to IPH and Cerebellar stroke patient has become Trach and Peg Dependent    # Hypertension  - restarted amlodipine 5mg daily, BPs now better     # Acute kidney injury  # Acute Tubular Necrosis superimposed on CKD. 3  - Pt s/p US renal with chronic medical renal disease  - Elevated BUN , Not on any meds that can elevate BUN , Hgb Stable   - 1 Litre LR bolus on 12/1, Cr improved, today 1.1s within ptn baseline range, ctm     # Chronic respiratory failure.   -Pt s/p percutaneous trach by pulm on 10/14/24  - Continue trach collar   - Continue routine trach care and suctioning PRN  - Chest PT      # Neurogenic dysphagia.   -Pt s/pPEG with surgery 10/21/24  - TF per nutrition  - aspiration precautions and elevation of HOB.    #Acute urinary retention.   - doxazosin 8mg  - Failure TOV on 11/25/24     # Functional quadriplegia in  setting of brainstem hemorrhage  - decub precautions  - care per nursing protocol     # DVT prop  -Continue Heparin SQ q8     # Dispo: can likely be stepped down to floors given clinical stability   44M with unclear PMH (?stroke, MI) who was found down at work, intubated for airway protection and found to have acute parenchymal hemorrhage within nabil with mass effect (+ acute/subacute right cerebellar infarct) in setting of hypertensive emergency, transferred to [facility] for further neurosurgical care. Hospital course c/b poor neurologic recovery s/p trach-PEG, AUR s/p gabriel, ANIKA on CKD c/b hyperkalemia, HAP s/p amp-sulbactam (EOT 10/23), K aerogenes bacteremia  treated with 2 weeks of Cefepime per ID , On 11/15 he became septic and was transferred to NSICU due to increased o2 requirements and needed Vent support , Trach Cultures + for Stenotrophomonas which was treated with 7 days of Bactrim per ID , has been weaned of Vent since 11/23 and is now on 10lts at 40% o2 through Trach Collar     # Pontine hemorrhage  # Encephalopathy due to Intracranial Bleed   - Acute parenchymal hemorrhage within nabil with mass effect (+ acute/subacute right cerebellar infarct) in setting of hypertensive emergency.   - MRI brain also demonstrated acute/subacute R cerebellar stroke.   - No Neurosurgical Interventions were performed   - due to IPH and Cerebellar stroke patient has become Trach and Peg Dependent    # Hypertension  - restarted amlodipine 5mg daily, BPs now better     # Acute kidney injury  # Acute Tubular Necrosis superimposed on CKD. 3  - Pt s/p US renal with chronic medical renal disease  - Elevated BUN , Not on any meds that can elevate BUN , Hgb Stable   - 1 Litre LR bolus on 12/1, Cr improved, today 1.1s within ptn baseline range, ctm     # Chronic respiratory failure.   -Pt s/p percutaneous trach by pulm on 10/14/24  - Continue trach collar   - Continue routine trach care and suctioning PRN  - Chest PT      # Neurogenic dysphagia.   -Pt s/pPEG with surgery 10/21/24  - TF per nutrition  - aspiration precautions and elevation of HOB.    #Acute urinary retention.   - doxazosin 8mg  - Failure TOV on 11/25/24     # Functional quadriplegia in  setting of brainstem hemorrhage  - decub precautions  - care per nursing protocol     # DVT prop  -plts 49, hold Heparin SQ q8     # Dispo: can likely be stepped down to floors given clinical stability   44M with unclear PMH (?stroke, MI) who was found down at work, intubated for airway protection and found to have acute parenchymal hemorrhage within nabil with mass effect (+ acute/subacute right cerebellar infarct) in setting of hypertensive emergency, transferred to [facility] for further neurosurgical care. Hospital course c/b poor neurologic recovery s/p trach-PEG, AUR s/p gabriel, ANIKA on CKD c/b hyperkalemia, HAP s/p amp-sulbactam (EOT 10/23), K aerogenes bacteremia  treated with 2 weeks of Cefepime per ID , On 11/15 he became septic and was transferred to NSICU due to increased o2 requirements and needed Vent support , Trach Cultures + for Stenotrophomonas which was treated with 7 days of Bactrim per ID , has been weaned of Vent since 11/23 and is now on 10lts at 40% o2 through Trach Collar     # Pontine hemorrhage  # Encephalopathy due to Intracranial Bleed   - Acute parenchymal hemorrhage within nabil with mass effect (+ acute/subacute right cerebellar infarct) in setting of hypertensive emergency.   - MRI brain also demonstrated acute/subacute R cerebellar stroke.   - No Neurosurgical Interventions were performed   - due to IPH and Cerebellar stroke patient has become Trach and Peg Dependent    # Hypertension  - restarted amlodipine 5mg daily, BPs now better     # Acute kidney injury  # Acute Tubular Necrosis superimposed on CKD. 3  - Pt s/p US renal with chronic medical renal disease  - Elevated BUN , Not on any meds that can elevate BUN , Hgb Stable   - 1 Litre LR bolus on 12/1, Cr improved, today 1.1s within ptn baseline range, ctm     # Chronic respiratory failure.   -Pt s/p percutaneous trach by pulm on 10/14/24  - Continue trach collar   - Continue routine trach care and suctioning PRN  - Chest PT      # Neurogenic dysphagia.   -Pt s/pPEG with surgery 10/21/24  - TF per nutrition  - aspiration precautions and elevation of HOB.    #Acute urinary retention.   - doxazosin 8mg  - Failure TOV on 11/25/24     # Functional quadriplegia in  setting of brainstem hemorrhage  - decub precautions  - care per nursing protocol     # DVT prop  - Heparin SQ q8     # Dispo: can likely be stepped down to floors given clinical stability

## 2024-12-09 NOTE — PROGRESS NOTE ADULT - SUBJECTIVE AND OBJECTIVE BOX
Patient is a 44y old  Male who presents with a chief complaint of brain stem bleed (09 Dec 2024 00:10)        SUBJECTIVE:  Patient was seen and examined at bedside, no clinical change    Overnight Events : NAEON    Last Bowel Movement: 08-Dec-2024 (12-08)  Last Bowel Movement: 08-Dec-2024 (12-08)  Last Bowel Movement: 06-Dec-2024 (12-06)  Last Bowel Movement: 30-Nov-2024 (12-05)  Last Bowel Movement: 30-Nov-2024 (12-04)  Last Bowel Movement: 30-Nov-2024 (12-04)  Last Bowel Movement: 30-Nov-2024 (12-03)  Last Bowel Movement: 30-Nov-2024 (12-03)      Review of systems: 12 point Review of systems negative unless otherwise documented elsewhere in note.     Diet, NPO with Tube Feed:   Tube Feeding Modality: Gastrostomy  [employer] Renal Support 1.8  Total Volume for 24 Hours (mL): 1080  Total Number of Cans: 5  Continuous  Starting Tube Feed Rate mL per Hour: 45  Increase Tube Feed Rate by (mL): 10     Every 4 hours  Until Goal Tube Feed Rate (mL per Hour): 60  Tube Feed Duration (in Hours): 18  Tube Feed Start Time: 10:00  Tube Feed Stop Time: 04:00  Banatrol TF     Qty per Day:  2 (11-20-24 @ 10:26) [Active]      MEDICATIONS:  MEDICATIONS  (STANDING):  acetylcysteine 10%  Inhalation 4 milliLiter(s) Inhalation every 8 hours  albuterol/ipratropium for Nebulization 3 milliLiter(s) Nebulizer every 8 hours  amLODIPine   Tablet 5 milliGRAM(s) Oral daily  artificial tears (preservative free) Ophthalmic Solution 1 Drop(s) Right EYE every 4 hours  chlorhexidine 2% Cloths 1 Application(s) Topical <User Schedule>  doxazosin 8 milliGRAM(s) Oral at bedtime  erythromycin   Ointment 1 Application(s) Right EYE at bedtime  heparin   Injectable 5000 Unit(s) SubCutaneous every 8 hours  ofloxacin 0.3% Solution 1 Drop(s) Right EYE four times a day  petrolatum Ophthalmic Ointment 1 Application(s) Both EYES two times a day  polyethylene glycol 3350 17 Gram(s) Oral daily  senna 2 Tablet(s) Oral at bedtime    MEDICATIONS  (PRN):  acetaminophen   Oral Liquid .. 650 milliGRAM(s) Oral every 6 hours PRN Temp greater or equal to 38.5C (101.3F), Mild Pain (1 - 3)      Allergies    Allergy Status Unknown    Intolerances        OBJECTIVE:  Vital Signs Last 24 Hrs  T(C): 36.8 (09 Dec 2024 09:03), Max: 36.8 (08 Dec 2024 14:00)  T(F): 98.2 (09 Dec 2024 09:03), Max: 98.3 (08 Dec 2024 14:00)  HR: 74 (09 Dec 2024 09:10) (70 - 80)  BP: 116/86 (09 Dec 2024 08:35) (116/86 - 152/93)  BP(mean): 97 (09 Dec 2024 08:35) (96 - 118)  RR: 16 (09 Dec 2024 09:10) (15 - 18)  SpO2: 96% (09 Dec 2024 09:10) (96% - 98%)    Parameters below as of 09 Dec 2024 09:10  Patient On (Oxygen Delivery Method): tracheostomy collar  O2 Flow (L/min): 40    I&O's Summary    08 Dec 2024 07:01  -  09 Dec 2024 07:00  --------------------------------------------------------  IN: 1700 mL / OUT: 1600 mL / NET: 100 mL        PHYSICAL EXAM:  General: awake, looking comfortable, no WOB on trach collar, not tracking horizontally today but tracking vertically  HEENT: tracheostomy clean  Lungs: poor inspiration, no crackles, no wheezes  Heart: regular  Abdomen: soft, no visible tenderness, + BS  Extremities: warm, no edema, not moving to command    LABS:                        11.5   7.49  )-----------( 209      ( 09 Dec 2024 05:30 )             35.9     12-09    138  |  103  |  49[H]  ----------------------------<  106[H]  3.8   |  25  |  1.14    Ca    8.9      09 Dec 2024 05:30  Phos  4.1     12-09  Mg     2.1     12-09          CAPILLARY BLOOD GLUCOSE        Urinalysis Basic - ( 09 Dec 2024 05:30 )    Color: x / Appearance: x / SG: x / pH: x  Gluc: 106 mg/dL / Ketone: x  / Bili: x / Urobili: x   Blood: x / Protein: x / Nitrite: x   Leuk Esterase: x / RBC: x / WBC x   Sq Epi: x / Non Sq Epi: x / Bacteria: x        MICRODATA:      RADIOLOGY/OTHER STUDIES:

## 2024-12-10 PROCEDURE — 99232 SBSQ HOSP IP/OBS MODERATE 35: CPT

## 2024-12-10 RX ADMIN — Medication 1 DROP(S): at 23:18

## 2024-12-10 RX ADMIN — OFLOXACIN 1 DROP(S): 3 SOLUTION OPHTHALMIC at 23:17

## 2024-12-10 RX ADMIN — Medication 650 MILLIGRAM(S): at 00:21

## 2024-12-10 RX ADMIN — AMLODIPINE BESYLATE 5 MILLIGRAM(S): 10 TABLET ORAL at 05:46

## 2024-12-10 RX ADMIN — POLYETHYLENE GLYCOL 3350 17 GRAM(S): 17 POWDER, FOR SOLUTION ORAL at 11:07

## 2024-12-10 RX ADMIN — IPRATROPIUM BROMIDE AND ALBUTEROL SULFATE 3 MILLILITER(S): .5; 2.5 SOLUTION RESPIRATORY (INHALATION) at 22:18

## 2024-12-10 RX ADMIN — OFLOXACIN 1 DROP(S): 3 SOLUTION OPHTHALMIC at 11:10

## 2024-12-10 RX ADMIN — Medication 1 DROP(S): at 03:33

## 2024-12-10 RX ADMIN — Medication 1 DROP(S): at 19:47

## 2024-12-10 RX ADMIN — Medication 1 APPLICATION(S): at 07:28

## 2024-12-10 RX ADMIN — HEPARIN SODIUM 5000 UNIT(S): 1000 INJECTION INTRAVENOUS; SUBCUTANEOUS at 22:18

## 2024-12-10 RX ADMIN — IPRATROPIUM BROMIDE AND ALBUTEROL SULFATE 3 MILLILITER(S): .5; 2.5 SOLUTION RESPIRATORY (INHALATION) at 05:46

## 2024-12-10 RX ADMIN — Medication 1 DROP(S): at 15:59

## 2024-12-10 RX ADMIN — Medication 650 MILLIGRAM(S): at 16:45

## 2024-12-10 RX ADMIN — ERYTHROMYCIN 1 APPLICATION(S): 5 OINTMENT OPHTHALMIC at 22:18

## 2024-12-10 RX ADMIN — Medication 1 APPLICATION(S): at 05:45

## 2024-12-10 RX ADMIN — HEPARIN SODIUM 5000 UNIT(S): 1000 INJECTION INTRAVENOUS; SUBCUTANEOUS at 14:23

## 2024-12-10 RX ADMIN — Medication 1 APPLICATION(S): at 17:59

## 2024-12-10 RX ADMIN — Medication 650 MILLIGRAM(S): at 01:21

## 2024-12-10 RX ADMIN — DOXAZOSIN MESYLATE 8 MILLIGRAM(S): 8 TABLET ORAL at 22:18

## 2024-12-10 RX ADMIN — HEPARIN SODIUM 5000 UNIT(S): 1000 INJECTION INTRAVENOUS; SUBCUTANEOUS at 05:46

## 2024-12-10 RX ADMIN — Medication 650 MILLIGRAM(S): at 16:12

## 2024-12-10 RX ADMIN — Medication 2 TABLET(S): at 22:18

## 2024-12-10 RX ADMIN — ACETYLCYSTEINE 4 MILLILITER(S): 200 INHALANT RESPIRATORY (INHALATION) at 14:23

## 2024-12-10 RX ADMIN — OFLOXACIN 1 DROP(S): 3 SOLUTION OPHTHALMIC at 17:55

## 2024-12-10 RX ADMIN — OFLOXACIN 1 DROP(S): 3 SOLUTION OPHTHALMIC at 05:47

## 2024-12-10 RX ADMIN — ACETYLCYSTEINE 4 MILLILITER(S): 200 INHALANT RESPIRATORY (INHALATION) at 05:46

## 2024-12-10 RX ADMIN — IPRATROPIUM BROMIDE AND ALBUTEROL SULFATE 3 MILLILITER(S): .5; 2.5 SOLUTION RESPIRATORY (INHALATION) at 14:24

## 2024-12-10 RX ADMIN — Medication 1 DROP(S): at 11:13

## 2024-12-10 RX ADMIN — Medication 1 DROP(S): at 08:01

## 2024-12-10 RX ADMIN — ACETYLCYSTEINE 4 MILLILITER(S): 200 INHALANT RESPIRATORY (INHALATION) at 22:18

## 2024-12-10 NOTE — CHART NOTE - NSCHARTNOTEFT_GEN_A_CORE
Admitting Diagnosis:   Patient is a 44y old  Male who presents with a chief complaint of brain stem bleed (10 Dec 2024 14:02)  PAST MEDICAL & SURGICAL HISTORY:  Acute myocardial infarction  CVA (cerebral vascular accident)    Current Nutrition Order: [employer] Renal Support 1.8: at goal of 60mL/hour x 18 hours, providing 1080mL total volume, 1944 calories, 86g protein, ~713mL free water; this meets ~24 calories/kg ideal body weight, ~1.1g protein/kg ideal body weight; with Banatrol Tube Feeding 2x/day    PO Intake: NPO with tube feeds meeting 100% estimated nutrient needs    GI Issues: soft/nontender abdomen, no noted distention per nursing, soft/nontender abdominal quadrants; BM noted on 12/10/24    Pain: Absence of non-verbal indicators of pain    Skin Integrity: intact; no pressure ulcers, no noted edema; PEG present; Mookie: Pedro      12-09-24 @ 07:01  -  12-10-24 @ 07:00  --------------------------------------------------------  IN: 980 mL / OUT: 500 mL / NET: 480 mL    12-10-24 @ 07:01  -  12-10-24 @ 15:26  --------------------------------------------------------  IN: 550 mL / OUT: 275 mL / NET: 275 mL        Labs:   12-09    138  |  103  |  49[H]  ----------------------------<  106[H]  3.8   |  25  |  1.14    Ca    8.9      09 Dec 2024 05:30  Phos  4.1     12-09  Mg     2.1     12-09    CAPILLARY BLOOD GLUCOSE    Medications:  MEDICATIONS  (STANDING):  acetylcysteine 10%  Inhalation 4 milliLiter(s) Inhalation every 8 hours  albuterol/ipratropium for Nebulization 3 milliLiter(s) Nebulizer every 8 hours  amLODIPine   Tablet 5 milliGRAM(s) Oral daily  artificial tears (preservative free) Ophthalmic Solution 1 Drop(s) Right EYE every 4 hours  chlorhexidine 2% Cloths 1 Application(s) Topical <User Schedule>  doxazosin 8 milliGRAM(s) Oral at bedtime  erythromycin   Ointment 1 Application(s) Right EYE at bedtime  heparin   Injectable 5000 Unit(s) SubCutaneous every 8 hours  ofloxacin 0.3% Solution 1 Drop(s) Right EYE four times a day  petrolatum Ophthalmic Ointment 1 Application(s) Both EYES two times a day  polyethylene glycol 3350 17 Gram(s) Oral daily  senna 2 Tablet(s) Oral at bedtime    MEDICATIONS  (PRN):  acetaminophen   Oral Liquid .. 650 milliGRAM(s) Oral every 6 hours PRN Temp greater or equal to 38.5C (101.3F), Mild Pain (1 - 3)    Dosing Anthropometrics:   Height for BMI (FEET)	5 Feet  Height for BMI (INCHES)	8 Inch(s)  Height for BMI (CM)	172.72 Centimeter(s)  Weight for BMI (lbs)	176.4 lb  Weight for BMI (kg)	80 kg  Body Mass Index	              26.8  ideal body weight:               154 pounds / 70 kg   %ideal body weight             114%     Weight Change: No new wt obtained since admit, strongly recommend nursing to obtain new wt to track/ trend changes     Estimated energy needs:  Weight used for calculations	ideal body weight  Estimated Energy Needs Weight (lbs)	154 lb  Estimated Energy Needs Weight (kg)	69.8 kg  Estimated Energy Needs From (christiana/kg)	25  Estimated Energy Needs To (christiana/kg)	30  Estimated Energy Needs Calculated From (christiana/kg)	1745  Estimated Energy Needs Calculated To (christiana/kg)	2094  Weight used for calculations	ideal body weight  Estimated Protein Needs Weight (lbs)	154 lb  Estimated Protein Needs Weight (kg)	69.8 kg  Estimated Protein Needs From (g/kg)	1.2  Estimated Protein Needs To (g/kg)	1.4  Estimated Protein Needs Calculated From (g/kg)	83.76  Estimated Protein Needs Calculated To (g/kg)	97.72  Estimated Fluid Needs Weight (lbs)	154 lb  Estimated Fluid Needs Weight (kg)	69.8 kg  Estimated Fluid Needs From (ml/kg)	25  Estimated Fluid Needs To (ml/kg)	30  Estimated Fluid Needs Calculated From (ml/kg)	1745  Estimated Fluid Needs Calculated To (ml/kg)	2094  Other Calculations	Pt is >100% ideal body weight stepped down from the ICU, thus ideal body weight used for all calculations. Needs adjusted for age, clinical course.     Subjective: This is a 44 year old male, unknown past medical history (reported stroke and MI by coworkers) presented to [facility] with AMS, Pt was working at [employer] when he was found down by coworkers. EMS called and pt brought to [facility] ED. Intubated, sedated, started on cardene for SBPs in 200s. CT head showed brain stem bleed. Transferred to NSICU for further management.    Patient seen at bedside on 8 East for nutrition follow up. Pt afebrile, no drips/pressor support, on tracheostomy collar (SpO2 95-99%). Current diet order: remains NPO with tube feeds meeting 100% estimated nutrient needs. Pt is receiving [employer] Renal 1.8 formula, pt's feeds at goal of 60mL/hour x 18 hours, with Banatrol 2x/day. Soft/nontender abdomen, no noted distention per nursing; BM on 12/10/24. Absence of non-verbal indicators of pain. Skin intact; no pressure ulcers, no noted edema; PEG present. Labs reviewed: elevated BUN (49), electrolytes and other labs are within normal limits at this time; medical team is aware. RDN will continue to follow. Please see nutrition recommendations below.    Previous Nutrition Diagnosis: Inadequate Oral Intake related to inability to meet nutritional demands via PO as evidenced by NPO with tube feedings    Active [  x ]  Resolved [   ]    Goal: Pt will consume >/= 75% of all meals and supplements via tolerated route     Nutrition Recommendations:  1. NPO with tube feedings, continue with current tube feed regimen to continue to meet 100% estimated needs:  **[employer] Renal Support 1.8: at goal of 60mL/hour x 18 hours, providing 1080mL total volume, 1944 calories, 86g protein, ~713mL free water; this meets ~24 calories/kg ideal body weight, ~1.1g protein/kg ideal body weight**  **Provide Banatrol Tube Feeding prn**  **Maintain aspiration precautions at all times; monitor for signs/symptoms of intolerance**  2. Monitor weights (weekly), GI function, skin integrity; electrolytes, BMP, glucose, CBC per MD discretion *renal indices*  3. Pain and bowel regimen per medical team  4. Align nutrition with goals of care at all times  5. Recommend nursing to obtain new weights to track/trend changes    Risk Level: High [  ] Moderate [ x ] Low [   ].

## 2024-12-10 NOTE — PROGRESS NOTE ADULT - SUBJECTIVE AND OBJECTIVE BOX
HPI:  Unknown age male, unknown past medical history (reported stroke and MI by coworkers) presented to [facility] with AMS, Pt was working at [employer] when he was found down by coworkers. EMS called and pt brought to [facility] ED. Intubated, sedated, started on cardene for SBPs in 200s. CT head showed brain stem bleed. Transferred to NSICU for further management.  (30 Sep 2024 12:55)    INTERVAL EVENTS:  GEORGE, was OOB to chair    HOSPITAL COURSE:  9/30: transferred from [facility]. A line placed. Versed dc'd. Cy Rader at bedside, states pt has family in [place], cannot confirm medications or PMH other than stroke and MI. 250cc bolus 3% given. LR switched to NS. hydralazine 25q8 started, 3% started, switched propofol to precedex   10/1: stability CTH done. Added labetalol, started TF. Palliative consulted. ethics consulted to determine surrogate. febrile 103, pan cx sent  10/2: BD 2, GEORGE overnight. TF resumed. Desatt'd to 80s, FiO2 inc. to 50. Fentanyl given, ABG, CXR ordered. Maxxed on precedex, started on propofol for DARIEN -4 - -5. Precedex dc'd. Duonebs, mucomyst, hypertonic added. 3% dc'd. Cardene dc'd. Start vanc/CTX. Increased labetalol 200q8. MRSA negative, dc'd vanc. ETT pulled back 2cm x 2, good positioning after confirmatory chest xray. Ethics attempting to establish HCP with family. Na 159, starting FW 250q6 for range 150-155.   10/3: BD3, GEORGE o/n, neuro stable. Na elevating, FW increased to 300q6. Dc'd bowel reg for diarrhea. vEEG started. SQH 5000q8 tonight.   10/4: BD 4, albumn bolus, incr. LR to 80 2/2 incr. in Cr, LR to 100cc/hr for uptrending Cr. Started 7% hypersal for 48hrs and SL atropine for inline/oral thick secretions. Dc'd CTX and started ancef for MSSA in the sputum. Nephrology consulted for CKD, f/u recs. SBP 170s, given hydralazine 10mg IVP.   10/5: BD5, o/n 10mg IVP hydralazine given for SBP 170s and started on hydralazine 25q8 via OGT. 10mg IV push labetalol for SBP > 160s. RT placed for diarrhea.   10/6: BD6, o/n FW increased to 350q4 per nephrology recs. IV tylenol for temp 100.6, SBp 160s presumed uncomfortable.   10/7: BD7, overnight pancultured for temp 101.8F.   10/8: BD8. GEORGE. Cr bumped. decreased LR to 75cc/hr. Adding simethicone ATC. incr hydralazine 50mgTID. Incr labetalol 300mgTID. Na 145, decreased FWF to 250q6. Start precedex. FENa consistent with intrinsic kidney injury. Pend repeat renal US. Retaining up to 1.3L, bladder scans q6, straight cath PRN  10/9: BD 9. GEORGE overnight. Neuro stable. abd xray for distention w non-specific gas pattern, OGT to LIWS for morning. duonebs/mucomyst to q8 for improving secretions. Changed tube feeds to Jevity 1.5 20cc/hr, low rate due to abdominal distention, nepro dense and more difficult to digest. Tolerating CPAP, confirmed by ABG.   10/10: BD 10. GEORGE overnight. Neuro stable. (+) gabriel for urinary retention on bladder scan. inc TF to goal rate of 40cc/hr. family leaning toward pursuing trach/PEG. 1/2 amp for FS 81.   10/11: BD 11. GEORGE overnight. Neuro stable. Trach/PEG consults placed.   10/12: BD 12. GEORGE overnight. Neuro stable. MRI brain complete.   10/13: BD 13. Increase flomax. Hold SQH after PM dose for trach tm. IVL.   10/14: BD 14. GEORGE overnight, remains on AC/VC. Gabriel placed for urinary retention. Dc'd free water.  S/p trach with pulm. NGT placed and CXR confirmed in good position.   10/15: BD 15, GEORGE ovn. resumed feeds. spiked 101, pan cx sent.   10/16: BD 16. GEORGE ovn. Lokelma 5mg for K+ 5.4. Started vanc q 24/zosyn for empiric PNA coverage, IVF to 100/hr. PEG held for fever.   10/17: BD 17,  ordered serum osm and urine osm for am. Started sinemet for neurostimulation. Increased cardura to 0.8. Started FW 100q4, dc'd IVF. MRSA negative, dc'd vanc. NGT replaced d/t coiling.   10/18: BD 18, GEORGE overnight, neuro stable. Amantadine added for neurostim. zosyn changed to unasyn for acinetobacter baumannii, failed TOV and required SC  10/19: BD 19, GEORGE ovn. cardura 2mg added for retention. labetalol decreased 200q8, hydralazine decreased 25q8. Gabriel replaced.   10/20: BD20, GEORGE overnight. NGT dislodged, replaced. PEG tomorrow w/ gen surg, FW increased to 150q4 and labetalol decreased to 100q8, lokelma given for hyperkalemia.   10/21: BD 21. POD0 PEG placement with Gen surg. decr labetolol to 50q8, incr. cardura to 0.4, started lokelma and phoslo, dc gabriel POD0 PEG placement with Gen surg.  10/22: BD 22. Plan to start TF today via PEG. dc labetalol, Following ophtho recs. Increased apnea settings - found to be in cheyne-moe respiration. CPAP 5/5.  10/23: BD 23. hydralazine d/c'd, trach collar trial today. Rectal tube placed at 6am.  10/24: BD24, o/n lokelma held due to diarrhea. Free water 100q6 resumed. dc'd tamsulosin, amantadine. Incr'd cardura to 8mg qhs. Dc'd FW. Switched jevity to nepro. gabriel placed for high urine output. Started SL atropine for oral secretions. Dc'd free water.  10/25: BD25, o/n decreased suctioning requirements to > q4hrs, GEORGE. Cr improving, cont phoslo, lokelma held at this time. Gabriel placed yest, cont. Tolerating trach collar. Given 500cc plasmalyte bolus for ANIKA. Dc'd sinemet.   10/26: BD26, o/n resumed lokelma 5mg daily and resumed 100cc free water q6hrs. Change in neuro status with new right pupillary dilation with anisocoria (right pupil 6mm fixed and left pupil 3mm briskly reactive). Given 23.4% NaCl bullet, taken for emergent CTH showing mostly resolved pontine hemorrhage, continued brainstem hypodensity likely edema d/t hemorrhage, no new hemorrhage or infarct, no herniation, mild increase in size of left lateral ventricle. Vitals remaine stable. Na goal > 140.   10/27: BD27, o/n GEORGE.Neuro stable. Pend stepdown with airway bed.   10/28: BD 28. GEORGE overnight. Neuro stable. Miralax ordered. Gabriel removed, pending TOV.  10/29: BD 29. GEORGE o/n. Given 2L NS over 8 hrs for increased BUN/Cr ratio. Gabriel placed for frequent straight cath.   10/30: BD 30.   10/31: BD 31. GEORGE overnight. Na 149, increased free water to 200q6. 1L NS for uptrending BUN.   11/1: BD 32. GEORGE overnight. Given 1L NS for dehydration. Na 146, increased FW to 250q6.   11/2: BD 33 GEORGE overnight, neuro stable, given 500cc bolus for net negative status and tachycardia   11/3: BD 34, GEORGE overnight, neuro stable. Patient remains tachycardic, EKG showing sinus tachycardia, given additional 500cc NS bolus. Febrile to 101.9F, pan cultured (without UA), CXR WNL, given tylenol.   11/4: BD 35, GEORGE overnight, neuro stable. Given 1L NS for tachycardia. sputum (+) for stenotropohomas maltophilia.   11/5: BD 36 GEORGE overnight, neuro stable. Vancomycin dc'd. Chest PT BID. ID consulted, cont zosyn.  11/6: BD 37. blood cx + klebsiella dc zosyn changed to cefepime, CTAP ordered, rpt blood cx sent.    11/7: BD 38. Pending CT A/P, given 250cc bolus and starting maintenance fluids overnight. Pending CT A/P after bolus   11/8: BD 39. CT CAP negative for infection.   11/9: BD 40. GEORGE overnight.  11/10: BD 41. GEORGE overnight. desat to 85 on trach collar, O2 inc to 10L and 100%, O2 sat inc to 95. pt tachy to 110s, euvolemic. given tylenol. ABG and CXR ordered. spiked fever, pancultured, RVP negative. AM ABG w pO2 79, rpt w pO2 79. pt appears comfortable, satting 94%.   11/11: BD 42. GEORGE overnight. pt became tachy to 130s, desat to 90 on 100% FiO2 and 10L. suctioned, (+) productive cough. temp 101.4, given 1g IV tylenol and 500cc NS bolus for euvolemia. fever and HR downtrending. LE dopplers negative for dvt  11/12: BD 43, GEORGE ovn, fever and HR downtrending, satting 97% 70% FIO2  11/13: BD 44, GEORGE ovn. started standing tylenol x24 hours for tachycardia. desat to 80s, o2 increased. CXR stable, pending CTA PE protocol.   11/14: BD 45, GEORGE overnight, neuro stable. resp therapy dec FiO2 to 70%.   11/15: BD 46, GEORGE overnight, neuro stable.  Rapid called for desaturation 30s, tachycardic 140s. Patient bagged, 100% fio2, heavily suctioned. CXR/POCUS unremarkable. ABG c/w desaturation. WBC 14.71. Afebrile. O2 improved to 90s and patient upgraded to ICU. ABG paO2 30s improved to 89 on vent. IV Tylenol x 1, sputum sent. Start protonix while o-n vent.   11/16: BD 47. POCUS showed collapsable IVCF, given 1L bolus. Vanco/Cefpime added empriic for PNA, NGT feeds restarted. MRSA swab neg, Vanc DC'd.   11/17: BD 48. GEORGE overnight. 1l bolus for tachycardia. Spiked to 101, cultured. 500cc bolus for tachycardia, tachy to 148 given 25mcg fentanyl, 250cc albumin, 1.5L bolus. 5 IV lopressor with response HR to 100s. +Stenotrophomonas on sputum cx.   11/18: BD 49. GEORGE overnight. Consulted ID, cefepime switched to bactrim x7days. Started hydrochlorothiazine 12.5mg daily.  11/19: BD 50. Tachy 120s, given tylenol and 500cc NS. Tolerating 5/5, switched to TCx. Holding phos binder. D/c Bactrim. D/c gabriel, f/u TOV. Dc'd PPI.   11/20: BD 51. GEORGE ovn. 1600 satting low 90s, mildly tachy to 110s, afebrile, RR wnl. O2 improved to mid 90s while inc O2 to 100% on TCx. CPAP 5/5 placed back on.  11/21: BD 52, GEORGE ovn, tolerating CPAP 5/5. Switch to trach collar during the day if tolerating well. HCTZ held for Cr bump, straight cath frequence increased to q4  11/22: BD 53, GEORGE ovn. Resumed phoslo. Gabriel placed. Resumed HCTZ.   11/23: BD 54. Holding tylenol in setting of possible fever, will require pan cx if febrile. Cr improved today. Cont CPAP. Bowel regimen held i/s/o diarrhea. FOBT negative.  11/24: BD 55. GEORGE overnight. Neuro stable. HCTZ dc'ed, started lisinopril 5. Lokelma dc'ed for K 3.7.   11/25: BD 56, GEORGE overnight. Neuro stable. dc'd lisinopril 5mg. Gabriel dc'd. TOV. 1545 noted to be hypotensive, MAP 50, in supine position on chair, HR 60s, afebrile, O2 96%. Given 1L cc NS bolus, placed back on bed in reverse trendelenberg, improved to map of 66. Neostick at bedside. Vitals check q1h. Dc'ed amlodipine. Failed TOV, bladder scan q6, sc prn. Added back Senna.   11/26: BD 57, GEORGE overnight. Neuro stable. Dc'd phoslo.   11/27: BD 58, GEORGE overnight. Neuro stable.    11/28: BD 59. Gabriel replaced.   11/29: GEORGE.  11/30: GEORGE, neuro stable.   12/1: BD 62, GEORGE overnight  12/2: BD 63, GEORGE overnight.; Given 1L bolus of LR for uptrending BUN/Cr.  12/3: BD 64. Reinstated eye gtt/moisture chamber given increased Rt eye injection  12/4: BD 65. GEORGE overnight. Attempted to speak with ophtho regarding eyelid weight/closure but no answer, full mailbox.   12/5: BD 66, GEORGE overnight, bowel regimen increased and had BM.   12/6: BD 67, GEORGE overnight, neuro stable.  12/7: GEORGE overnight, neuro stable. /110s, given x1 hydralazine 10 mg IVP. Restarted home amlodipine 5mg.  12/8: GEORGE. OOB to chair.       Vital Signs Last 24 Hrs  T(C): 37 (09 Dec 2024 21:38), Max: 37.7 (09 Dec 2024 17:00)  T(F): 98.6 (09 Dec 2024 21:38), Max: 99.8 (09 Dec 2024 17:00)  HR: 103 (09 Dec 2024 21:10) (74 - 104)  BP: 126/101 (09 Dec 2024 20:35) (116/84 - 132/90)  BP(mean): 110 (09 Dec 2024 20:35) (96 - 110)  RR: 17 (09 Dec 2024 21:10) (16 - 18)  SpO2: 92% (09 Dec 2024 21:10) (92% - 98%)    Parameters below as of 09 Dec 2024 21:10  Patient On (Oxygen Delivery Method): tracheostomy collar  O2 Flow (L/min): 10  O2 Concentration (%): 40    I&O's Summary    08 Dec 2024 07:01  -  09 Dec 2024 07:00  --------------------------------------------------------  IN: 1700 mL / OUT: 1600 mL / NET: 100 mL    09 Dec 2024 07:01  -  10 Dec 2024 00:07  --------------------------------------------------------  IN: 60 mL / OUT: 200 mL / NET: -140 mL        PHYSICAL EXAM:  GEN: supine in bed in NAD  Neuro: OEs spont, A&Ox0, does not follow commands, R hand spontaneous movement in fingers, RLE withdraws to noxious, LLE withdrawal to noxious, LUE 0/5  HEENT: PERRL, only tracks vertically, +trach collar  Neck: supple  Cardiac: RRR, S1S2  Pulmonary: chest rise symmetric  Abdomen: soft, nontender, nondistended, +PEG  Ext: perfusing well  Skin: warm, dry    LABS:                        11.5   7.49  )-----------( 209      ( 09 Dec 2024 05:30 )             35.9     12-09    138  |  103  |  49[H]  ----------------------------<  106[H]  3.8   |  25  |  1.14    Ca    8.9      09 Dec 2024 05:30  Phos  4.1     12-09  Mg     2.1     12-09        Urinalysis Basic - ( 09 Dec 2024 05:30 )    Color: x / Appearance: x / SG: x / pH: x  Gluc: 106 mg/dL / Ketone: x  / Bili: x / Urobili: x   Blood: x / Protein: x / Nitrite: x   Leuk Esterase: x / RBC: x / WBC x   Sq Epi: x / Non Sq Epi: x / Bacteria: x          CAPILLARY BLOOD GLUCOSE          Drug Levels: [] N/A    CSF Analysis: [] N/A      Allergies    Allergy Status Unknown    Intolerances      MEDICATIONS:  Antibiotics:    Neuro:  acetaminophen   Oral Liquid .. 650 milliGRAM(s) Oral every 6 hours PRN    Anticoagulation:  heparin   Injectable 5000 Unit(s) SubCutaneous every 8 hours    OTHER:  acetylcysteine 10%  Inhalation 4 milliLiter(s) Inhalation every 8 hours  albuterol/ipratropium for Nebulization 3 milliLiter(s) Nebulizer every 8 hours  amLODIPine   Tablet 5 milliGRAM(s) Oral daily  artificial tears (preservative free) Ophthalmic Solution 1 Drop(s) Right EYE every 4 hours  chlorhexidine 2% Cloths 1 Application(s) Topical <User Schedule>  doxazosin 8 milliGRAM(s) Oral at bedtime  erythromycin   Ointment 1 Application(s) Right EYE at bedtime  ofloxacin 0.3% Solution 1 Drop(s) Right EYE four times a day  petrolatum Ophthalmic Ointment 1 Application(s) Both EYES two times a day  polyethylene glycol 3350 17 Gram(s) Oral daily  senna 2 Tablet(s) Oral at bedtime    IVF:    CULTURES:    RADIOLOGY & ADDITIONAL TESTS:      ASSESSMENT:  45 y/o PMH ?stroke/MI present to [facility] after collapsing at work. Decorticate posturing, vomiting, intubated for airway protection. 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 Transferred to [facility] for further management. s/p trach 10/14. s/p peg 10/21. Re-upgrade to ICU 2/2 desaturation event and suctioning requirements 11/15.     PLAN:  Neuro:  - neuro/vitals q4h, protected sleep time 10PM-5AM  - pain control: tylenol prn  - vEEG (10/3-4)- negative, (10/17-10/19) - negative  - CTH 9/30: enlarged pontine hemorrhage, CTH 10/3: stable, CTH 10/25: mostly resolved pontine hemorrhage  - MRI brain 10/12: parenchymal hemorrhage, acute/subacute R cerebellar stroke    - stroke core measures, stroke neuro signed off    CV:  - -160  - HTN: amlodipine 5mg, hold lisinopril 5mg   - echo (9/30) EF 75%    Resp:  - trach collar   - Secretions: duonebs/mucomyst/chest PT q8h   - CTA chest 11/13 negative for PE, inc ground glass opacities    GI:  - TF via PEG (placed 10/21 by gen surg)  - bowel regimen, last BM 12/5    Renal:  - IVL; FW flushes 250cc q6hrs   - Urinary retenion: Gabriel replaced 11/28  - CKD: trend BUN/Cr  - renal US 10/1: echogenicity c/w chronic med renal dz, repeat 10/8: inc renal echogeicity, c/f medical renal disease w increased hydro     Endo:  - A1c 5.4    Heme:  - DVT ppx: SCDs, SQH 5000u q8h   - LE dopplers negative 11/11    ID:  - febrile, last pancx 11/16, MRSA swab neg 11/16   -  s/p Stenotrophomonas maltophilia PNA: s/p Bactrim (11/18-11/19) s/p Cefepime (11/16-11/18)  - 11/3, (+) sputum for stenotrophomas maltophlia, blood cx (+) klebsiella, cefepime 2gq12 (11/6 - 11/12)   - empiric tx: s/p zosyn (11/3-11/6) , s/p vanc  (11/3-11/5)  - S/p Ancef (10/4-10/14) for PNA, and s/p Unasyn (10/18-10/23) +actinobacter baumanii     MISC:  - ophtho consult for keratitis  - erythromycin ointment to rt eye q4hrs, ofloxacin ointment to rt eye QID, artificial tears to rt eye q2hrs, moisture chamber at bedtime    Dispo: tele status, full code, pending placement and guardianship hearing     D/w Dr. D'Amico

## 2024-12-10 NOTE — PROGRESS NOTE ADULT - SUBJECTIVE AND OBJECTIVE BOX
Patient is a 44y old  Male who presents with a chief complaint of brain stem bleed (09 Dec 2024 00:10)        SUBJECTIVE:  Patient was seen and examined at bedside, no clinical change    Overnight Events :  per nursing , no increased secretions , no events overnight      Last Bowel Movement: 10-Dec-2024 (12-10)    Diet, NPO with Tube Feed:   Tube Feeding Modality: Gastrostomy  [place] Renal Support 1.8  Total Volume for 24 Hours (mL): 1080  Total Number of Cans: 5  Continuous  Starting Tube Feed Rate mL per Hour: 45  Increase Tube Feed Rate by (mL): 10     Every 4 hours  Until Goal Tube Feed Rate (mL per Hour): 60  Tube Feed Duration (in Hours): 18  Tube Feed Start Time: 10:00  Tube Feed Stop Time: 04:00  Banatrol TF     Qty per Day:  2 (11-20-24 @ 10:26) [Active]      MEDICATIONS:  MEDICATIONS  (STANDING):  acetylcysteine 10%  Inhalation 4 milliLiter(s) Inhalation every 8 hours  albuterol/ipratropium for Nebulization 3 milliLiter(s) Nebulizer every 8 hours  amLODIPine   Tablet 5 milliGRAM(s) Oral daily  artificial tears (preservative free) Ophthalmic Solution 1 Drop(s) Right EYE every 4 hours  chlorhexidine 2% Cloths 1 Application(s) Topical <User Schedule>  doxazosin 8 milliGRAM(s) Oral at bedtime  erythromycin   Ointment 1 Application(s) Right EYE at bedtime  heparin   Injectable 5000 Unit(s) SubCutaneous every 8 hours  ofloxacin 0.3% Solution 1 Drop(s) Right EYE four times a day  petrolatum Ophthalmic Ointment 1 Application(s) Both EYES two times a day  polyethylene glycol 3350 17 Gram(s) Oral daily  senna 2 Tablet(s) Oral at bedtime    MEDICATIONS  (PRN):  acetaminophen   Oral Liquid .. 650 milliGRAM(s) Oral every 6 hours PRN Temp greater or equal to 38.5C (101.3F), Mild Pain (1 - 3)      Allergies    Allergy Status Unknown    Intolerances        OBJECTIVE:  Vital Signs Last 24 Hrs  T(C): 36.7 (10 Dec 2024 09:09), Max: 37.7 (09 Dec 2024 17:00)  T(F): 98 (10 Dec 2024 09:09), Max: 99.8 (09 Dec 2024 17:00)  HR: 76 (10 Dec 2024 11:39) (76 - 112)  BP: 126/86 (10 Dec 2024 11:39) (119/83 - 132/90)  BP(mean): 102 (10 Dec 2024 11:39) (97 - 110)  RR: 18 (10 Dec 2024 09:09) (17 - 18)  SpO2: 95% (10 Dec 2024 11:39) (92% - 96%)    Parameters below as of 10 Dec 2024 09:09  Patient On (Oxygen Delivery Method): tracheostomy collar  O2 Flow (L/min): 10  O2 Concentration (%): 40        PHYSICAL EXAM:  General: awake, looking comfortable, no WOB on trach collar, not tracking horizontally today but tracking vertically  HEENT: tracheostomy clean  Lungs: poor inspiration, no crackles, no wheezes  Heart: regular  Abdomen: soft, no visible tenderness, + BS  Extremities: warm, no edema, not moving to command    LABS:                        11.5   7.49  )-----------( 209      ( 09 Dec 2024 05:30 )             35.9     12-09    138  |  103  |  49[H]  ----------------------------<  106[H]  3.8   |  25  |  1.14    Ca    8.9      09 Dec 2024 05:30  Phos  4.1     12-09  Mg     2.1     12-09          CAPILLARY BLOOD GLUCOSE        Urinalysis Basic - ( 09 Dec 2024 05:30 )    Color: x / Appearance: x / SG: x / pH: x  Gluc: 106 mg/dL / Ketone: x  / Bili: x / Urobili: x   Blood: x / Protein: x / Nitrite: x   Leuk Esterase: x / RBC: x / WBC x   Sq Epi: x / Non Sq Epi: x / Bacteria: x        MICRODATA:      RADIOLOGY/OTHER STUDIES:

## 2024-12-10 NOTE — PROGRESS NOTE ADULT - ASSESSMENT
44M with unclear PMH (?stroke, MI) who was found down at work, intubated for airway protection and found to have acute parenchymal hemorrhage within nabil with mass effect (+ acute/subacute right cerebellar infarct) in setting of hypertensive emergency, transferred to [facility] for further neurosurgical care. Hospital course c/b poor neurologic recovery s/p trach-PEG, AUR s/p gabriel, ANIKA on CKD c/b hyperkalemia, HAP s/p amp-sulbactam (EOT 10/23), K aerogenes bacteremia  treated with 2 weeks of Cefepime per ID , On 11/15 he became septic and was transferred to NSICU due to increased o2 requirements and needed Vent support , Trach Cultures + for Stenotrophomonas which was treated with 7 days of Bactrim per ID , has been weaned of Vent since 11/23 and is now on 10lts at 40% o2 through Trach Collar     # Pontine hemorrhage  # Encephalopathy due to Intracranial Bleed   - Acute parenchymal hemorrhage within nabil with mass effect (+ acute/subacute right cerebellar infarct) in setting of hypertensive emergency.   - MRI brain also demonstrated acute/subacute R cerebellar stroke.   - No Neurosurgical Interventions were performed   - due to IPH and Cerebellar stroke patient has become Trach and Peg Dependent    # Hypertension  - restarted amlodipine 5mg daily, BPs now better     # Acute kidney injury  # Acute Tubular Necrosis superimposed on CKD. 3  - Pt s/p US renal with chronic medical renal disease  - Elevated BUN , Not on any meds that can elevate BUN , Hgb Stable   - 1 Litre LR bolus on 12/1, Cr improved, today 1.1s within ptn baseline range, ctm     # Chronic respiratory failure.   -Pt s/p percutaneous trach by pulm on 10/14/24  - Continue trach collar   - Continue routine trach care and suctioning PRN  - Chest PT      # Neurogenic dysphagia.   -Pt s/pPEG with surgery 10/21/24  - TF per nutrition  - aspiration precautions and elevation of HOB.    #Acute urinary retention.   - doxazosin 8mg  - Failure TOV on 11/25/24     # Functional quadriplegia in  setting of brainstem hemorrhage  - decub precautions  - care per nursing protocol     # DVT prop  - Heparin SQ q8     # Dispo:  Please step down to Regional Floor

## 2024-12-11 LAB
ANION GAP SERPL CALC-SCNC: 10 MMOL/L — SIGNIFICANT CHANGE UP (ref 5–17)
BUN SERPL-MCNC: 53 MG/DL — HIGH (ref 7–23)
CALCIUM SERPL-MCNC: 9.1 MG/DL — SIGNIFICANT CHANGE UP (ref 8.4–10.5)
CHLORIDE SERPL-SCNC: 102 MMOL/L — SIGNIFICANT CHANGE UP (ref 96–108)
CO2 SERPL-SCNC: 26 MMOL/L — SIGNIFICANT CHANGE UP (ref 22–31)
CREAT SERPL-MCNC: 1.29 MG/DL — SIGNIFICANT CHANGE UP (ref 0.5–1.3)
EGFR: 69 ML/MIN/1.73M2 — SIGNIFICANT CHANGE UP
EGFR: 69 ML/MIN/1.73M2 — SIGNIFICANT CHANGE UP
GLUCOSE SERPL-MCNC: 115 MG/DL — HIGH (ref 70–99)
HCT VFR BLD CALC: 37.6 % — LOW (ref 39–50)
HGB BLD-MCNC: 11.9 G/DL — LOW (ref 13–17)
MAGNESIUM SERPL-MCNC: 2.3 MG/DL — SIGNIFICANT CHANGE UP (ref 1.6–2.6)
MCHC RBC-ENTMCNC: 30.4 PG — SIGNIFICANT CHANGE UP (ref 27–34)
MCHC RBC-ENTMCNC: 31.6 G/DL — LOW (ref 32–36)
MCV RBC AUTO: 95.9 FL — SIGNIFICANT CHANGE UP (ref 80–100)
NRBC # BLD: 0 /100 WBCS — SIGNIFICANT CHANGE UP (ref 0–0)
NRBC BLD-RTO: 0 /100 WBCS — SIGNIFICANT CHANGE UP (ref 0–0)
PHOSPHATE SERPL-MCNC: 4.6 MG/DL — HIGH (ref 2.5–4.5)
PLATELET # BLD AUTO: 190 K/UL — SIGNIFICANT CHANGE UP (ref 150–400)
POTASSIUM SERPL-MCNC: 3.6 MMOL/L — SIGNIFICANT CHANGE UP (ref 3.5–5.3)
POTASSIUM SERPL-SCNC: 3.6 MMOL/L — SIGNIFICANT CHANGE UP (ref 3.5–5.3)
RBC # BLD: 3.92 M/UL — LOW (ref 4.2–5.8)
RBC # FLD: 14.7 % — HIGH (ref 10.3–14.5)
SODIUM SERPL-SCNC: 138 MMOL/L — SIGNIFICANT CHANGE UP (ref 135–145)
WBC # BLD: 7.88 K/UL — SIGNIFICANT CHANGE UP (ref 3.8–10.5)
WBC # FLD AUTO: 7.88 K/UL — SIGNIFICANT CHANGE UP (ref 3.8–10.5)

## 2024-12-11 PROCEDURE — 99232 SBSQ HOSP IP/OBS MODERATE 35: CPT

## 2024-12-11 PROCEDURE — 99232 SBSQ HOSP IP/OBS MODERATE 35: CPT | Mod: GC

## 2024-12-11 PROCEDURE — 74018 RADEX ABDOMEN 1 VIEW: CPT | Mod: 26

## 2024-12-11 RX ORDER — SIMETHICONE 80 MG
80 TABLET,CHEWABLE ORAL THREE TIMES A DAY
Refills: 0 | Status: DISCONTINUED | OUTPATIENT
Start: 2024-12-11 | End: 2024-12-16

## 2024-12-11 RX ORDER — IPRATROPIUM BROMIDE AND ALBUTEROL SULFATE .5; 2.5 MG/3ML; MG/3ML
3 SOLUTION RESPIRATORY (INHALATION) EVERY 6 HOURS
Refills: 0 | Status: DISCONTINUED | OUTPATIENT
Start: 2024-12-11 | End: 2025-02-16

## 2024-12-11 RX ORDER — ACETYLCYSTEINE 200 MG/ML
4 INHALANT RESPIRATORY (INHALATION) EVERY 6 HOURS
Refills: 0 | Status: DISCONTINUED | OUTPATIENT
Start: 2024-12-11 | End: 2025-02-16

## 2024-12-11 RX ADMIN — Medication 40 MILLIEQUIVALENT(S): at 07:29

## 2024-12-11 RX ADMIN — IPRATROPIUM BROMIDE AND ALBUTEROL SULFATE 3 MILLILITER(S): .5; 2.5 SOLUTION RESPIRATORY (INHALATION) at 05:27

## 2024-12-11 RX ADMIN — ERYTHROMYCIN 1 APPLICATION(S): 5 OINTMENT OPHTHALMIC at 21:53

## 2024-12-11 RX ADMIN — Medication 1 APPLICATION(S): at 06:27

## 2024-12-11 RX ADMIN — IPRATROPIUM BROMIDE AND ALBUTEROL SULFATE 3 MILLILITER(S): .5; 2.5 SOLUTION RESPIRATORY (INHALATION) at 13:37

## 2024-12-11 RX ADMIN — Medication 650 MILLIGRAM(S): at 11:08

## 2024-12-11 RX ADMIN — ACETYLCYSTEINE 4 MILLILITER(S): 200 INHALANT RESPIRATORY (INHALATION) at 22:42

## 2024-12-11 RX ADMIN — DOXAZOSIN MESYLATE 8 MILLIGRAM(S): 8 TABLET ORAL at 21:48

## 2024-12-11 RX ADMIN — HEPARIN SODIUM 5000 UNIT(S): 1000 INJECTION INTRAVENOUS; SUBCUTANEOUS at 13:37

## 2024-12-11 RX ADMIN — Medication 1 DROP(S): at 11:09

## 2024-12-11 RX ADMIN — OFLOXACIN 1 DROP(S): 3 SOLUTION OPHTHALMIC at 17:34

## 2024-12-11 RX ADMIN — HEPARIN SODIUM 5000 UNIT(S): 1000 INJECTION INTRAVENOUS; SUBCUTANEOUS at 05:27

## 2024-12-11 RX ADMIN — ACETYLCYSTEINE 4 MILLILITER(S): 200 INHALANT RESPIRATORY (INHALATION) at 13:37

## 2024-12-11 RX ADMIN — AMLODIPINE BESYLATE 5 MILLIGRAM(S): 10 TABLET ORAL at 05:28

## 2024-12-11 RX ADMIN — Medication 1 DROP(S): at 07:29

## 2024-12-11 RX ADMIN — OFLOXACIN 1 DROP(S): 3 SOLUTION OPHTHALMIC at 11:09

## 2024-12-11 RX ADMIN — OFLOXACIN 1 DROP(S): 3 SOLUTION OPHTHALMIC at 05:28

## 2024-12-11 RX ADMIN — Medication 1 APPLICATION(S): at 17:34

## 2024-12-11 RX ADMIN — ACETYLCYSTEINE 4 MILLILITER(S): 200 INHALANT RESPIRATORY (INHALATION) at 05:27

## 2024-12-11 RX ADMIN — Medication 2 TABLET(S): at 21:49

## 2024-12-11 RX ADMIN — Medication 1 DROP(S): at 19:19

## 2024-12-11 RX ADMIN — Medication 1 APPLICATION(S): at 06:26

## 2024-12-11 RX ADMIN — Medication 650 MILLIGRAM(S): at 12:08

## 2024-12-11 RX ADMIN — HEPARIN SODIUM 5000 UNIT(S): 1000 INJECTION INTRAVENOUS; SUBCUTANEOUS at 21:51

## 2024-12-11 RX ADMIN — OFLOXACIN 1 DROP(S): 3 SOLUTION OPHTHALMIC at 23:27

## 2024-12-11 RX ADMIN — Medication 1 DROP(S): at 03:51

## 2024-12-11 RX ADMIN — Medication 1 DROP(S): at 15:56

## 2024-12-11 RX ADMIN — Medication 80 MILLIGRAM(S): at 21:49

## 2024-12-11 RX ADMIN — ACETYLCYSTEINE 4 MILLILITER(S): 200 INHALANT RESPIRATORY (INHALATION) at 23:27

## 2024-12-11 RX ADMIN — IPRATROPIUM BROMIDE AND ALBUTEROL SULFATE 3 MILLILITER(S): .5; 2.5 SOLUTION RESPIRATORY (INHALATION) at 21:53

## 2024-12-11 NOTE — PROGRESS NOTE ADULT - ASSESSMENT
44M with unclear PMH (?stroke, MI) who was found down at work, intubated for airway protection and found to have acute parenchymal hemorrhage within nabil with mass effect (+ acute/subacute right cerebellar infarct) in setting of hypertensive emergency, transferred to [facility] for further neurosurgical care. Hospital course c/b poor neurologic recovery s/p trach-PEG, AUR s/p gabriel, ANIKA on CKD c/b hyperkalemia, HAP s/p amp-sulbactam (EOT 10/23), K aerogenes bacteremia  treated with 2 weeks of Cefepime per ID , On 11/15 he became septic and was transferred to NSICU due to increased o2 requirements and needed Vent support , Trach Cultures + for Stenotrophomonas which was treated with 7 days of Bactrim per ID , has been weaned of Vent since 11/23 and is now on 10lts at 40% o2 through Trach Collar     # Pontine hemorrhage  # Encephalopathy due to Intracranial Bleed   - Acute parenchymal hemorrhage within nabil with mass effect (+ acute/subacute right cerebellar infarct) in setting of hypertensive emergency.   - MRI brain also demonstrated acute/subacute R cerebellar stroke.   - No Neurosurgical Interventions were performed   - due to IPH and Cerebellar stroke patient has become Trach and Peg Dependent    # Hypertension  - restarted amlodipine 5mg daily, BPs now better     # Acute kidney injury  # Acute Tubular Necrosis superimposed on CKD. 3  - Pt s/p US renal with chronic medical renal disease  - Elevated BUN , Not on any meds that can elevate BUN , Hgb Stable   - 1 Litre LR bolus on 12/1, Cr improved, today 1.1s within ptn baseline range, ctm     # Chronic respiratory failure.   -Pt s/p percutaneous trach by pulm on 10/14/24  - Continue trach collar   - Continue routine trach care and suctioning PRN  - Chest PT      # Neurogenic dysphagia.   -Pt s/pPEG with surgery 10/21/24  - TF per nutrition  - aspiration precautions and elevation of HOB.    #Acute urinary retention.   - doxazosin 8mg  - Failure TOV on 11/25/24     # Functional quadriplegia in  setting of brainstem hemorrhage  - decub precautions  - care per nursing protocol     # DVT prop  - Heparin SQ q8     # Dispo:  Please step down to Regional Floor

## 2024-12-11 NOTE — PROGRESS NOTE ADULT - SUBJECTIVE AND OBJECTIVE BOX
INTERVAL COURSE / SUBJECTIVE:  Patient seen and evaluated at bedside this AM. No acute issues overnight. Grimacing with some neck movement, but positioned well.  Working with PT/OT yesterday.  -----------------------------------------------------------------------  REVIEW OF SYSTEMS:  Constitutional - No fever,  No fatigue  Neurological -   Pain -   Bowel -   Bladder -   -----------------------------------------------------------------------  FUNCTIONAL PROGRESS:    Physical Therapy: 12/10    Cognitive/Neuro/Behavioral  Level of Consciousness: alert  Arousal Level: arouses to voice  Orientation: person  Speech: trached;  unable to speak;  indicates yes via eyebrow raise and no via eye closure  Mood/Behavior: calm;  cooperative    Language Assistance  Preferred Language to Address Healthcare Preferred Language to Address Healthcare: Bengali  's name: [employer] assisting therapist is fluent Bengali speaker     Respiratory      Respiratory Pre/Post Treatment Assessment  O2 Delivery/Oxygen Delivery Method Patient On (Oxygen Delivery Method): tracheostomy collar    Therapeutic Interventions      Bed-Chair Transfer Training  Transfer Training Bed-to-Chair Transfer: dependent (less than 25% patient effort);  2 person assist;  via air tap  Transfer Training Chair-to-Bed Transfer: left semi-reclined in bedside recliner  Bed-to-Chair Transfer Training Transfer Safety Analysis: decreased weight-shifting ability;  abnormal muscle tone;  impaired motor control;  impaired postural control;  impaired balance;  decreased strength    Neuromuscular Re-education  Neuromuscular Re-education Detail: Pt. squeezing R hand to command 3/3 trials. Wiggled R toes to command 1/3 trials to command with tapping along muscle belly.  Pt. opens/closes eyes and raises bilateral eyebrows to respond to yes/no questions 100% of the time. Resting in L cervical rotation however observed to rotate head to the R 75% of the way to command requiring increased time to perform. OPens mouth on command, appears to attempt to show therapist tongue on command. Performed bilateral UE/LE PROM through all available ROM.         Occupational Therapy: 12/9    Cognitive/Neuro/Behavioral  Cognitive/Neuro/Behavioral [WDL Definition: Alert; opens eyes spontaneously; arouses to voice or touch; oriented x 4; follows commands; speech spontaneous, logical; purposeful motor response; behavior appropriate to situation]: WDL except  Level of Consciousness: alert  Arousal Level: arouses to voice  Orientation: disoriented to;  place;  time;  situation  Speech: trached  Mood/Behavior: calm;  cooperative    Language Assistance  Preferred Language to Address Healthcare Preferred Language to Address Healthcare: Bengali  Preferred Sign Language Preferred Sign Language: STEVEN Huizar and Rehab Tech Marline able to communicate throughout     Therapeutic Interventions      Bed Mobility  Bed Mobility Training Rolling/Turning: dependent (less than 25% patient effort);  verbal cues;  nonverbal cues (demo/gestures);  2 person assist  Bed Mobility Training Limitations: decreased flexibility;  decreased ROM;  decreased strength;  impaired balance;  impaired coordination;  impaired motor control;  impaired postural control;  impaired sensory feedback;  cognitive, decreased safety awareness;  decreased ability to use arms for pushing/pulling;  decreased ability to use legs for bridging/pushing;  impaired ability to control trunk for mobility    Bed-Chair Transfer Training  Transfer Training Bed-to-Chair Transfer: dependent (less than 25% patient effort);  verbal cues;  nonverbal cues (demo/gestures);  2 person assist;  +3rd person for line management;  lateral transfer with air tap  Transfer Training Chair-to-Bed Transfer: dependent (less than 25% patient effort);  verbal cues;  nonverbal cues (demo/gestures);  2 person assist;  +3rd person for line management;  Lateral transfer with air tap  Bed-to-Chair Transfer Training Transfer Safety Analysis: decreased flexibility;  decreased ROM;  decreased strength;  impaired balance;  impaired coordination;  impaired motor control;  impaired postural control;  impaired sensory feedback;  cognitive, decreased safety awareness    Therapeutic Exercise  Therapeutic Exercise Detail: Pt tolerated sitting in bedside chair ~15 minutes throughout session. Pt able to perform 3/3  on R side with trace  on L hand in 1/3 trials. Pt able to wiggle R toes on command in 3/3 trials, pt with trace movement in L toes in 2/5 trials, pt hyperreflexive in quads with L foot dorsiflexion. Pt with 2-/5 R shoulder internal rotation. Pt able to turn head to the R to auditory stimuli in 3/3 trials, able to move head back to neutral after turns. This session, pt able to shake head "no" during therapist questions. Pt continues to utilize eyebrow raise for "yes."     Lower Body Dressing Training  Lower Body Dressing Training Assistance: dependent (less than 25% patient effort);  verbal cues;  nonverbal cues (demo/gestures);  1 person assist;  don socks in semi-supine;  decreased flexibility;  decreased ROM;  decreased strength;  impaired balance;  impaired coordination;  impaired motor control;  impaired postural control;  cognitive, decreased safety awareness    Grooming Training  Grooming Training Assistance: dependent (less than 25% patient effort);  verbal cues;  nonverbal cues (demo/gestures);  1 person assist;  don lotion in B feet in semi-supine;  decreased flexibility;  decreased strength;  decreased ROM;  impaired balance;  impaired coordination;  impaired motor control;  impaired postural control;  cognitive, decreased safety awareness    Toilet Training  Toilet Training Assistance: dependent (less than 25% patient effort);  verbal cues;  nonverbal cues (demo/gestures);  1 person assist;  pericare in semi-supine;  decreased flexibility;  decreased ROM;  decreased strength;  impaired balance;  impaired coordination;  impaired motor control;  impaired postural control;  cognitive, decreased safety awareness    OT Cognitive Treatment  OT Cognitive Treatment Treatment Detail: Pt grossly A&Ox2-3 (to self, hospital, month/year however not situation), followed 85% single step commands. Patient scored a (6/30) on the O-Log. A score of >25 is associated with normal orientation. Pt requires cues to orient to name of hospital, date and situation, of note pt max score is a 10 2/2 multiple choice/yes no responses required. Pt with increasing ability to fixate on therapists/ objects during session spontaneously as seen during grooming on feet, pt able to maintain gaze onto foot during ADL.       Speech Therapy: 12/4  Overall Progress Summary    Progress Summary: Speech f/u completed. Pt was seen fully awake and alert, HOB fully elevated, on O2 via trach collar. Cuff was partially inflated. Cuff fully deflated by this SLP- this was reviewed by RAMONA Rolon and MISTY Don. Oral and tacheal suctioning completed prior to full cuff deflation- copious sections retrieved. Oral care completed as well by RN. Pt tolerated full cuff deflation throughout session without signs of respiratory distress and maintained >97% O2 saturation. Spontanous mouth movements noted. In consultation with PT/OT, now utilizing eye brow raise to indicate "yes." This was used during this session as well. Eyes closed for "no" introduced today and utilized throughout session. Pt answered simple yes/no questions in relation to self, orientation questions in relation to place, and environment with 100% accuracy utilizing these nonverbal indicators for yes/no. Pt also correctly identified colors in 9/10= 90% using the same indicators and answered yes/no with 100% to ID pictures of objects. Yes/no was also used to indicate wants/needs- for example, pt was asked if he was cold, pt raised eyebrows for "yes." [place] was placed- pt was asked the question again and he indicated "no" with eye closed (he primarily gets left eye closure). PMV was also placed throughout session - no signs of resp distress or airstacking. Pt tolerated speaking valve use without signs/symptoms of resp distress and maintained oxygen saturation >97%. PMV was removed upon completion of the session, though enocuraged to be used with supervision as tolerated and with precautions in place. Full cuff deflation with PMV use reviewed with nursing and warning sign placed above bed. PMV use encouraged with PT/OT as tolerated - reviewed with clinicians, nursing, and NSGY. Pt left is no apparent distress. Cuff left fully deflated- medical team and nursing all aware and in agreement to continue full cuff deflation as tolerated to allow for improvement in sensation, upper airway flow, and cough effectiveness. This service to continue to follow.       -----------------------------------------------------------------------  VITALS  T(C): 36.7 (12-11-24 @ 08:55), Max: 37.1 (12-10-24 @ 22:17)  HR: 66 (12-11-24 @ 11:55) (66 - 88)  BP: 118/79 (12-11-24 @ 11:55) (116/76 - 140/101)  RR: 18 (12-11-24 @ 11:55) (16 - 18)  SpO2: 97% (12-11-24 @ 11:55) (94% - 99%)  Wt(kg): --    PHYSICAL EXAM  Constitutional - NAD, Comfortable  HEENT - NCAT  Chest - Breathing comfortably, +trach collar  Cardiovascular - Regular pulse  Abdomen - No visible abdominal distension  Extremities - No deformities of upper or lower limbs. No calf tenderness   MSK - ROM within functional limits  Neurologic Exam -                    Cognitive - Awake, follows commands inconsistently, squeezes hand on command. +vertical eye movements for yes/no questions     Communication - non-verbal     Cranial Nerves -  difficulty tracking horizontally     Motor - trace movement of right hand. No other voluntary movement at this time, although limited by fatigue   Psychiatric - Mood stable, Affect WNL     -----------------------------------------------------------------------  RECENT LABS  CBC Full  -  ( 11 Dec 2024 05:30 )  WBC Count : 7.88 K/uL  RBC Count : 3.92 M/uL  Hemoglobin : 11.9 g/dL  Hematocrit : 37.6 %  Platelet Count - Automated : 190 K/uL  Mean Cell Volume : 95.9 fl  Mean Cell Hemoglobin : 30.4 pg  Mean Cell Hemoglobin Concentration : 31.6 g/dL  Auto Neutrophil # : x  Auto Lymphocyte # : x  Auto Monocyte # : x  Auto Eosinophil # : x  Auto Basophil # : x  Auto Neutrophil % : x  Auto Lymphocyte % : x  Auto Monocyte % : x  Auto Eosinophil % : x  Auto Basophil % : x    12-11    138  |  102  |  53[H]  ----------------------------<  115[H]  3.6   |  26  |  1.29    Ca    9.1      11 Dec 2024 05:30  Phos  4.6     12-11  Mg     2.3     12-11      Urinalysis Basic - ( 11 Dec 2024 05:30 )    Color: x / Appearance: x / SG: x / pH: x  Gluc: 115 mg/dL / Ketone: x  / Bili: x / Urobili: x   Blood: x / Protein: x / Nitrite: x   Leuk Esterase: x / RBC: x / WBC x   Sq Epi: x / Non Sq Epi: x / Bacteria: x        -----------------------------------------------------------------------  IMAGING      -----------------------------------------------------------------------  MEDICATIONS   acetaminophen   Oral Liquid .. 650 milliGRAM(s) every 6 hours PRN  acetylcysteine 10%  Inhalation 4 milliLiter(s) every 8 hours  albuterol/ipratropium for Nebulization 3 milliLiter(s) every 8 hours  amLODIPine   Tablet 5 milliGRAM(s) daily  artificial tears (preservative free) Ophthalmic Solution 1 Drop(s) every 4 hours  chlorhexidine 2% Cloths 1 Application(s) <User Schedule>  doxazosin 8 milliGRAM(s) at bedtime  erythromycin   Ointment 1 Application(s) at bedtime  heparin   Injectable 5000 Unit(s) every 8 hours  ofloxacin 0.3% Solution 1 Drop(s) four times a day  petrolatum Ophthalmic Ointment 1 Application(s) two times a day  polyethylene glycol 3350 17 Gram(s) daily  senna 2 Tablet(s) at bedtime               INTERVAL COURSE / SUBJECTIVE:  Patient seen and evaluated at bedside this AM. No acute issues overnight. Grimacing with some neck movement, but positioned well.  Working with PT/OT yesterday.  -----------------------------------------------------------------------  REVIEW OF SYSTEMS:  Constitutional - No fever,  No fatigue  Neurological - stable  Pain - ?neck discomfort  Bowel - BM+  Bladder - gabriel  -----------------------------------------------------------------------  FUNCTIONAL PROGRESS:    Physical Therapy: 12/10    Cognitive/Neuro/Behavioral  Level of Consciousness: alert  Arousal Level: arouses to voice  Orientation: person  Speech: trached;  unable to speak;  indicates yes via eyebrow raise and no via eye closure  Mood/Behavior: calm;  cooperative    Language Assistance  Preferred Language to Address Healthcare Preferred Language to Address Healthcare: Tajik  's name: [employer] assisting therapist is fluent Tajik speaker     Respiratory      Respiratory Pre/Post Treatment Assessment  O2 Delivery/Oxygen Delivery Method Patient On (Oxygen Delivery Method): tracheostomy collar    Therapeutic Interventions      Bed-Chair Transfer Training  Transfer Training Bed-to-Chair Transfer: dependent (less than 25% patient effort);  2 person assist;  via air tap  Transfer Training Chair-to-Bed Transfer: left semi-reclined in bedside recliner  Bed-to-Chair Transfer Training Transfer Safety Analysis: decreased weight-shifting ability;  abnormal muscle tone;  impaired motor control;  impaired postural control;  impaired balance;  decreased strength    Neuromuscular Re-education  Neuromuscular Re-education Detail: Pt. squeezing R hand to command 3/3 trials. Wiggled R toes to command 1/3 trials to command with tapping along muscle belly.  Pt. opens/closes eyes and raises bilateral eyebrows to respond to yes/no questions 100% of the time. Resting in L cervical rotation however observed to rotate head to the R 75% of the way to command requiring increased time to perform. OPens mouth on command, appears to attempt to show therapist tongue on command. Performed bilateral UE/LE PROM through all available ROM.         Occupational Therapy: 12/9    Cognitive/Neuro/Behavioral  Cognitive/Neuro/Behavioral [WDL Definition: Alert; opens eyes spontaneously; arouses to voice or touch; oriented x 4; follows commands; speech spontaneous, logical; purposeful motor response; behavior appropriate to situation]: WDL except  Level of Consciousness: alert  Arousal Level: arouses to voice  Orientation: disoriented to;  place;  time;  situation  Speech: trached  Mood/Behavior: calm;  cooperative    Language Assistance  Preferred Language to Address Healthcare Preferred Language to Address Healthcare: Tajik  Preferred Sign Language Preferred Sign Language: STEVEN Huizar and Rehab Tech Marline able to communicate throughout     Therapeutic Interventions      Bed Mobility  Bed Mobility Training Rolling/Turning: dependent (less than 25% patient effort);  verbal cues;  nonverbal cues (demo/gestures);  2 person assist  Bed Mobility Training Limitations: decreased flexibility;  decreased ROM;  decreased strength;  impaired balance;  impaired coordination;  impaired motor control;  impaired postural control;  impaired sensory feedback;  cognitive, decreased safety awareness;  decreased ability to use arms for pushing/pulling;  decreased ability to use legs for bridging/pushing;  impaired ability to control trunk for mobility    Bed-Chair Transfer Training  Transfer Training Bed-to-Chair Transfer: dependent (less than 25% patient effort);  verbal cues;  nonverbal cues (demo/gestures);  2 person assist;  +3rd person for line management;  lateral transfer with air tap  Transfer Training Chair-to-Bed Transfer: dependent (less than 25% patient effort);  verbal cues;  nonverbal cues (demo/gestures);  2 person assist;  +3rd person for line management;  Lateral transfer with air tap  Bed-to-Chair Transfer Training Transfer Safety Analysis: decreased flexibility;  decreased ROM;  decreased strength;  impaired balance;  impaired coordination;  impaired motor control;  impaired postural control;  impaired sensory feedback;  cognitive, decreased safety awareness    Therapeutic Exercise  Therapeutic Exercise Detail: Pt tolerated sitting in bedside chair ~15 minutes throughout session. Pt able to perform 3/3  on R side with trace  on L hand in 1/3 trials. Pt able to wiggle R toes on command in 3/3 trials, pt with trace movement in L toes in 2/5 trials, pt hyperreflexive in quads with L foot dorsiflexion. Pt with 2-/5 R shoulder internal rotation. Pt able to turn head to the R to auditory stimuli in 3/3 trials, able to move head back to neutral after turns. This session, pt able to shake head "no" during therapist questions. Pt continues to utilize eyebrow raise for "yes."     Lower Body Dressing Training  Lower Body Dressing Training Assistance: dependent (less than 25% patient effort);  verbal cues;  nonverbal cues (demo/gestures);  1 person assist;  don socks in semi-supine;  decreased flexibility;  decreased ROM;  decreased strength;  impaired balance;  impaired coordination;  impaired motor control;  impaired postural control;  cognitive, decreased safety awareness    Grooming Training  Grooming Training Assistance: dependent (less than 25% patient effort);  verbal cues;  nonverbal cues (demo/gestures);  1 person assist;  don lotion in B feet in semi-supine;  decreased flexibility;  decreased strength;  decreased ROM;  impaired balance;  impaired coordination;  impaired motor control;  impaired postural control;  cognitive, decreased safety awareness    Toilet Training  Toilet Training Assistance: dependent (less than 25% patient effort);  verbal cues;  nonverbal cues (demo/gestures);  1 person assist;  pericare in semi-supine;  decreased flexibility;  decreased ROM;  decreased strength;  impaired balance;  impaired coordination;  impaired motor control;  impaired postural control;  cognitive, decreased safety awareness    OT Cognitive Treatment  OT Cognitive Treatment Treatment Detail: Pt grossly A&Ox2-3 (to self, hospital, month/year however not situation), followed 85% single step commands. Patient scored a (6/30) on the O-Log. A score of >25 is associated with normal orientation. Pt requires cues to orient to name of hospital, date and situation, of note pt max score is a 10 2/2 multiple choice/yes no responses required. Pt with increasing ability to fixate on therapists/ objects during session spontaneously as seen during grooming on feet, pt able to maintain gaze onto foot during ADL.       Speech Therapy: 12/4  Overall Progress Summary    Progress Summary: Speech f/u completed. Pt was seen fully awake and alert, HOB fully elevated, on O2 via trach collar. Cuff was partially inflated. Cuff fully deflated by this SLP- this was reviewed by RAMONA Rolon and MISTY Don. Oral and tacheal suctioning completed prior to full cuff deflation- copious sections retrieved. Oral care completed as well by RN. Pt tolerated full cuff deflation throughout session without signs of respiratory distress and maintained >97% O2 saturation. Spontanous mouth movements noted. In consultation with PT/OT, now utilizing eye brow raise to indicate "yes." This was used during this session as well. Eyes closed for "no" introduced today and utilized throughout session. Pt answered simple yes/no questions in relation to self, orientation questions in relation to place, and environment with 100% accuracy utilizing these nonverbal indicators for yes/no. Pt also correctly identified colors in 9/10= 90% using the same indicators and answered yes/no with 100% to ID pictures of objects. Yes/no was also used to indicate wants/needs- for example, pt was asked if he was cold, pt raised eyebrows for "yes." [place] was placed- pt was asked the question again and he indicated "no" with eye closed (he primarily gets left eye closure). PMV was also placed throughout session - no signs of resp distress or airstacking. Pt tolerated speaking valve use without signs/symptoms of resp distress and maintained oxygen saturation >97%. PMV was removed upon completion of the session, though enocuraged to be used with supervision as tolerated and with precautions in place. Full cuff deflation with PMV use reviewed with nursing and warning sign placed above bed. PMV use encouraged with PT/OT as tolerated - reviewed with clinicians, nursing, and NSGY. Pt left is no apparent distress. Cuff left fully deflated- medical team and nursing all aware and in agreement to continue full cuff deflation as tolerated to allow for improvement in sensation, upper airway flow, and cough effectiveness. This service to continue to follow.       -----------------------------------------------------------------------  VITALS  T(C): 36.7 (12-11-24 @ 08:55), Max: 37.1 (12-10-24 @ 22:17)  HR: 66 (12-11-24 @ 11:55) (66 - 88)  BP: 118/79 (12-11-24 @ 11:55) (116/76 - 140/101)  RR: 18 (12-11-24 @ 11:55) (16 - 18)  SpO2: 97% (12-11-24 @ 11:55) (94% - 99%)  Wt(kg): --    PHYSICAL EXAM  Constitutional - NAD, Comfortable  HEENT - NCAT  Chest - Breathing comfortably, +trach collar  Cardiovascular - Regular pulse  Abdomen - No visible abdominal distension  Extremities - No deformities of upper or lower limbs. No calf tenderness   MSK - ROM within functional limits  Neurologic Exam -                    Cognitive - Awake, follows commands inconsistently, squeezes hand on command. +vertical eye movements for yes/no questions     Communication - non-verbal     Cranial Nerves -  difficulty tracking horizontally     Motor - trace movement of right hand. No other voluntary movement at this time, although limited by fatigue   Psychiatric - Mood stable, Affect WNL     -----------------------------------------------------------------------  RECENT LABS  CBC Full  -  ( 11 Dec 2024 05:30 )  WBC Count : 7.88 K/uL  RBC Count : 3.92 M/uL  Hemoglobin : 11.9 g/dL  Hematocrit : 37.6 %  Platelet Count - Automated : 190 K/uL  Mean Cell Volume : 95.9 fl  Mean Cell Hemoglobin : 30.4 pg  Mean Cell Hemoglobin Concentration : 31.6 g/dL  Auto Neutrophil # : x  Auto Lymphocyte # : x  Auto Monocyte # : x  Auto Eosinophil # : x  Auto Basophil # : x  Auto Neutrophil % : x  Auto Lymphocyte % : x  Auto Monocyte % : x  Auto Eosinophil % : x  Auto Basophil % : x    12-11    138  |  102  |  53[H]  ----------------------------<  115[H]  3.6   |  26  |  1.29    Ca    9.1      11 Dec 2024 05:30  Phos  4.6     12-11  Mg     2.3     12-11      Urinalysis Basic - ( 11 Dec 2024 05:30 )    Color: x / Appearance: x / SG: x / pH: x  Gluc: 115 mg/dL / Ketone: x  / Bili: x / Urobili: x   Blood: x / Protein: x / Nitrite: x   Leuk Esterase: x / RBC: x / WBC x   Sq Epi: x / Non Sq Epi: x / Bacteria: x        -----------------------------------------------------------------------  IMAGING      -----------------------------------------------------------------------  MEDICATIONS   acetaminophen   Oral Liquid .. 650 milliGRAM(s) every 6 hours PRN  acetylcysteine 10%  Inhalation 4 milliLiter(s) every 8 hours  albuterol/ipratropium for Nebulization 3 milliLiter(s) every 8 hours  amLODIPine   Tablet 5 milliGRAM(s) daily  artificial tears (preservative free) Ophthalmic Solution 1 Drop(s) every 4 hours  chlorhexidine 2% Cloths 1 Application(s) <User Schedule>  doxazosin 8 milliGRAM(s) at bedtime  erythromycin   Ointment 1 Application(s) at bedtime  heparin   Injectable 5000 Unit(s) every 8 hours  ofloxacin 0.3% Solution 1 Drop(s) four times a day  petrolatum Ophthalmic Ointment 1 Application(s) two times a day  polyethylene glycol 3350 17 Gram(s) daily  senna 2 Tablet(s) at bedtime

## 2024-12-11 NOTE — PROGRESS NOTE ADULT - ASSESSMENT
44 year old male with functional, ADL, cognitive, and speech impairments in the setting of brainstem hemorrhage, s/p trach and PEG.     Functional quadriplegia in the setting of Brainstem hemorrhage   Respiratory dysfunction s/p Trach  Spasticity - left finger flexors  Dysphagia s/p PEG  HTN - amlodipine, HCTZ  Urinary retention - +gabriel    PLAN / RECOMMENDATIONS:     # Rehab / Mobilization:   - Initial therapy assessment: [X] PT  [X] OT   - Continue skilled therapy while admitted to prevent secondary complications of immobility focus on transfer training, bed mobility, progressive ambulation, and equipment evaluation.   - Educated patient and family members on post-acute rehabilitation. Discussed anticipated rehab course.  - Encouraged HOB elevation to at least 30-40 deg to prevent orthostasis   - nursing to reposition q2 turning to prevent skin breakdown given limited mobility   - Keep extremities elevated on pillows to assist with edema and positioning.   - Ensure proper positioning of neck to midline to avoid torticollis   - Therapy precautions: falls, orthostasis, hypoxia    # Bracing/Splinting:   - Z-Flow multi-podus boots for positioning/contracture prevention  - Continue Left resting hand splint 8am-8pm     # Pain Management:   - Avoid/ monitor use sedating medications that may interfere with cognitive recovery   - Current pain regimen reviewed    # Speech/ Swallow:   - Dysphagia screen [X]  - SLP consult for swallow function evaluation and treatment, evaluate for PMV - ok for PMV during therapy sessions  - Diet:  NPO, continuous feeds through PEG    # GI/ Bowel:  - Continue to monitor bowel patterns.   - Bowel Regimen: miralax    # / Bladder: + gabriel  - Continue to monitor bladder patterns, avoid constipation.       # DVT Prophylaxis:   - SCDs, chemoprophylaxis - heparin    # Disposition optimization:     - Disposition recommendation - Acute Inpatient Rehabilitation  - Showing some neurologic improvement and command following.   - Care coordination working on emergency medicaid     Patient has significant functional impairments and would benefit from continued rehabilitation in the ACUTE inpatient rehab setting. He has both medical and functional needs appropriate for acute inpatient rehabilitation and would benefit from comprehensive interdisciplinary care, including a physiatrist, rehabilitation nursing, PT, OT, SLT and case management/social work. We anticipate that he would be able to tolerate 3 hours of PT/OT/SLT per day for 5 to 6 days per week to focus on mobility, transfers, gait training with assistive devices, ADL training, speech, swallow, cognition, and patient education/safety.

## 2024-12-11 NOTE — PROGRESS NOTE ADULT - SUBJECTIVE AND OBJECTIVE BOX
Patient is a 44y old  Male who presents with a chief complaint of brain stem bleed (11 Dec 2024 00:17)        SUBJECTIVE:  Patient was seen and examined at bedside, clinically unchanged    Overnight Events : NAEON    Last Bowel Movement: 10-Dec-2024 (12-10)  Last Bowel Movement: 10-Dec-2024 (12-10)  Last Bowel Movement: 08-Dec-2024 (12-08)  Last Bowel Movement: 08-Dec-2024 (12-08)  Last Bowel Movement: 06-Dec-2024 (12-06)  Last Bowel Movement: 30-Nov-2024 (12-05)  Last Bowel Movement: 30-Nov-2024 (12-04)      Review of systems: 12 point Review of systems negative unless otherwise documented elsewhere in note.     Diet, NPO with Tube Feed:   Tube Feeding Modality: Gastrostomy  [place] [place] Renal Support 1.8  Total Volume for 24 Hours (mL): 1080  Total Number of Cans: 5  Continuous  Starting Tube Feed Rate mL per Hour: 45  Increase Tube Feed Rate by (mL): 10     Every 4 hours  Until Goal Tube Feed Rate (mL per Hour): 60  Tube Feed Duration (in Hours): 18  Tube Feed Start Time: 10:00  Tube Feed Stop Time: 04:00  Banatrol TF     Qty per Day:  2 (11-20-24 @ 10:26) [Active]      MEDICATIONS:  MEDICATIONS  (STANDING):  acetylcysteine 10%  Inhalation 4 milliLiter(s) Inhalation every 8 hours  albuterol/ipratropium for Nebulization 3 milliLiter(s) Nebulizer every 8 hours  amLODIPine   Tablet 5 milliGRAM(s) Oral daily  artificial tears (preservative free) Ophthalmic Solution 1 Drop(s) Right EYE every 4 hours  chlorhexidine 2% Cloths 1 Application(s) Topical <User Schedule>  doxazosin 8 milliGRAM(s) Oral at bedtime  erythromycin   Ointment 1 Application(s) Right EYE at bedtime  heparin   Injectable 5000 Unit(s) SubCutaneous every 8 hours  ofloxacin 0.3% Solution 1 Drop(s) Right EYE four times a day  petrolatum Ophthalmic Ointment 1 Application(s) Both EYES two times a day  polyethylene glycol 3350 17 Gram(s) Oral daily  senna 2 Tablet(s) Oral at bedtime    MEDICATIONS  (PRN):  acetaminophen   Oral Liquid .. 650 milliGRAM(s) Oral every 6 hours PRN Temp greater or equal to 38.5C (101.3F), Mild Pain (1 - 3)      Allergies    Allergy Status Unknown    Intolerances        OBJECTIVE:  Vital Signs Last 24 Hrs  T(C): 36.7 (11 Dec 2024 08:55), Max: 37.1 (10 Dec 2024 14:23)  T(F): 98 (11 Dec 2024 08:55), Max: 98.7 (10 Dec 2024 14:23)  HR: 74 (11 Dec 2024 08:25) (70 - 88)  BP: 116/76 (11 Dec 2024 08:25) (116/76 - 140/101)  BP(mean): 90 (11 Dec 2024 08:25) (90 - 116)  RR: 18 (11 Dec 2024 08:25) (16 - 18)  SpO2: 99% (11 Dec 2024 08:25) (94% - 99%)    Parameters below as of 11 Dec 2024 08:25  Patient On (Oxygen Delivery Method): tracheostomy collar  O2 Flow (L/min): 10  O2 Concentration (%): 40  I&O's Summary    10 Dec 2024 07:01  -  11 Dec 2024 07:00  --------------------------------------------------------  IN: 1840 mL / OUT: 1025 mL / NET: 815 mL        PHYSICAL EXAM:  General: awake, looking comfortable, no WOB on trach collar, not tracking horizontally today but tracking vertically  HEENT: tracheostomy clean  Lungs: poor inspiration, no crackles, no wheezes  Heart: regular  Abdomen: soft, no visible tenderness, + BS  Extremities: warm, no edema, not moving to command    LABS:                        11.9   7.88  )-----------( 190      ( 11 Dec 2024 05:30 )             37.6     12-11    138  |  102  |  53[H]  ----------------------------<  115[H]  3.6   |  26  |  1.29    Ca    9.1      11 Dec 2024 05:30  Phos  4.6     12-11  Mg     2.3     12-11          CAPILLARY BLOOD GLUCOSE        Urinalysis Basic - ( 11 Dec 2024 05:30 )    Color: x / Appearance: x / SG: x / pH: x  Gluc: 115 mg/dL / Ketone: x  / Bili: x / Urobili: x   Blood: x / Protein: x / Nitrite: x   Leuk Esterase: x / RBC: x / WBC x   Sq Epi: x / Non Sq Epi: x / Bacteria: x        MICRODATA:      RADIOLOGY/OTHER STUDIES:

## 2024-12-11 NOTE — PROGRESS NOTE ADULT - SUBJECTIVE AND OBJECTIVE BOX
HPI:  Unknown age male, unknown past medical history (reported stroke and MI by coworkers) presented to [facility] with AMS, Pt was working at [employer] when he was found down by coworkers. EMS called and pt brought to [facility] ED. Intubated, sedated, started on cardene for SBPs in 200s. CT head showed brain stem bleed. Transferred to NSICU for further management.  (30 Sep 2024 12:55)    INTERVAL EVENTS:  GEORGE    Vital Signs Last 24 Hrs  T(C): 37.1 (10 Dec 2024 22:17), Max: 37.3 (10 Dec 2024 09:00)  T(F): 98.7 (10 Dec 2024 22:17), Max: 99.2 (10 Dec 2024 09:00)  HR: 88 (10 Dec 2024 23:56) (70 - 95)  BP: 122/74 (10 Dec 2024 23:56) (118/90 - 140/101)  BP(mean): 92 (10 Dec 2024 23:56) (92 - 116)  RR: 16 (10 Dec 2024 23:56) (16 - 18)  SpO2: 95% (10 Dec 2024 23:56) (93% - 97%)    Parameters below as of 10 Dec 2024 23:56  Patient On (Oxygen Delivery Method): tracheostomy collar  O2 Flow (L/min): 10  O2 Concentration (%): 40    I&O's Summary    09 Dec 2024 07:01  -  10 Dec 2024 07:00  --------------------------------------------------------  IN: 980 mL / OUT: 500 mL / NET: 480 mL    10 Dec 2024 07:01  -  11 Dec 2024 00:19  --------------------------------------------------------  IN: 1280 mL / OUT: 475 mL / NET: 805 mL        PHYSICAL EXAM:  GEN: supine in bed in NAD  Neuro: OEs spont, A&Ox0, does not follow commands, R hand spontaneous movement in fingers, RLE withdraws to noxious, LLE withdrawal to noxious, LUE 0/5  HEENT: PERRL, only tracks vertically, +trach collar  Neck: supple  Cardiac: RRR, S1S2  Pulmonary: chest rise symmetric  Abdomen: soft, nontender, nondistended, +PEG  Ext: perfusing well  Skin: warm, dry    LABS:                        11.5   7.49  )-----------( 209      ( 09 Dec 2024 05:30 )             35.9     12-09    138  |  103  |  49[H]  ----------------------------<  106[H]  3.8   |  25  |  1.14    Ca    8.9      09 Dec 2024 05:30  Phos  4.1     12-09  Mg     2.1     12-09        Urinalysis Basic - ( 09 Dec 2024 05:30 )    Color: x / Appearance: x / SG: x / pH: x  Gluc: 106 mg/dL / Ketone: x  / Bili: x / Urobili: x   Blood: x / Protein: x / Nitrite: x   Leuk Esterase: x / RBC: x / WBC x   Sq Epi: x / Non Sq Epi: x / Bacteria: x          CAPILLARY BLOOD GLUCOSE          Drug Levels: [] N/A    CSF Analysis: [] N/A      Allergies    Allergy Status Unknown    Intolerances      MEDICATIONS:  Antibiotics:    Neuro:  acetaminophen   Oral Liquid .. 650 milliGRAM(s) Oral every 6 hours PRN    Anticoagulation:  heparin   Injectable 5000 Unit(s) SubCutaneous every 8 hours    OTHER:  acetylcysteine 10%  Inhalation 4 milliLiter(s) Inhalation every 8 hours  albuterol/ipratropium for Nebulization 3 milliLiter(s) Nebulizer every 8 hours  amLODIPine   Tablet 5 milliGRAM(s) Oral daily  artificial tears (preservative free) Ophthalmic Solution 1 Drop(s) Right EYE every 4 hours  chlorhexidine 2% Cloths 1 Application(s) Topical <User Schedule>  doxazosin 8 milliGRAM(s) Oral at bedtime  erythromycin   Ointment 1 Application(s) Right EYE at bedtime  ofloxacin 0.3% Solution 1 Drop(s) Right EYE four times a day  petrolatum Ophthalmic Ointment 1 Application(s) Both EYES two times a day  polyethylene glycol 3350 17 Gram(s) Oral daily  senna 2 Tablet(s) Oral at bedtime    IVF:    CULTURES:    RADIOLOGY & ADDITIONAL TESTS:      ASSESSMENT:  43 y/o PMH ?stroke/MI present to [facility] after collapsing at work. Decorticate posturing, vomiting, intubated for airway protection. 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 Transferred to [facility] for further management. s/p trach 10/14. s/p peg 10/21. Re-upgrade to ICU 2/2 desaturation event and suctioning requirements 11/15.     PLAN:  Neuro:  - neuro/vitals q4h, protected sleep time 10PM-5AM  - pain control: tylenol prn  - vEEG (10/3-4)- negative, (10/17-10/19) - negative  - CTH 9/30: enlarged pontine hemorrhage, CTH 10/3: stable, CTH 10/25: mostly resolved pontine hemorrhage  - MRI brain 10/12: parenchymal hemorrhage, acute/subacute R cerebellar stroke    - stroke core measures, stroke neuro signed off    CV:  - -160  - HTN: amlodipine 5mg, hold lisinopril 5mg   - echo (9/30) EF 75%    Resp:  - trach collar   - Secretions: duonebs/mucomyst/chest PT q8h   - CTA chest 11/13 negative for PE, inc ground glass opacities    GI:  - TF via PEG (placed 10/21 by gen surg)  - bowel regimen, last BM 12/5    Renal:  - IVL; FW flushes 250cc q6hrs   - Urinary retenion: Vuong replaced 11/28  - CKD: trend BUN/Cr  - renal US 10/1: echogenicity c/w chronic med renal dz, repeat 10/8: inc renal echogeicity, c/f medical renal disease w increased hydro     Endo:  - A1c 5.4    Heme:  - DVT ppx: SCDs, SQH 5000u q8h   - LE dopplers negative 11/11    ID:  - febrile, last pancx 11/16, MRSA swab neg 11/16   -  s/p Stenotrophomonas maltophilia PNA: s/p Bactrim (11/18-11/19) s/p Cefepime (11/16-11/18)  - 11/3, (+) sputum for stenotrophomas maltophlia, blood cx (+) klebsiella, cefepime 2gq12 (11/6 - 11/12)   - empiric tx: s/p zosyn (11/3-11/6) , s/p vanc  (11/3-11/5)  - S/p Ancef (10/4-10/14) for PNA, and s/p Unasyn (10/18-10/23) +actinobacter baumanii     MISC:  - ophtho consult for keratitis  - erythromycin ointment to rt eye q4hrs, ofloxacin ointment to rt eye QID, artificial tears to rt eye q2hrs, moisture chamber at bedtime    Dispo: tele status, full code, pending placement and guardianship hearing     D/w Dr. D'Amico

## 2024-12-12 PROCEDURE — 99231 SBSQ HOSP IP/OBS SF/LOW 25: CPT

## 2024-12-12 PROCEDURE — 99232 SBSQ HOSP IP/OBS MODERATE 35: CPT

## 2024-12-12 RX ORDER — POLYETHYLENE GLYCOL 3350 17 G/17G
17 POWDER, FOR SOLUTION ORAL EVERY 12 HOURS
Refills: 0 | Status: DISCONTINUED | OUTPATIENT
Start: 2024-12-12 | End: 2024-12-17

## 2024-12-12 RX ORDER — SENNA 187 MG
2 TABLET ORAL EVERY 12 HOURS
Refills: 0 | Status: DISCONTINUED | OUTPATIENT
Start: 2024-12-12 | End: 2024-12-17

## 2024-12-12 RX ADMIN — AMLODIPINE BESYLATE 5 MILLIGRAM(S): 10 TABLET ORAL at 05:10

## 2024-12-12 RX ADMIN — ERYTHROMYCIN 1 APPLICATION(S): 5 OINTMENT OPHTHALMIC at 22:29

## 2024-12-12 RX ADMIN — HEPARIN SODIUM 5000 UNIT(S): 1000 INJECTION INTRAVENOUS; SUBCUTANEOUS at 15:30

## 2024-12-12 RX ADMIN — IPRATROPIUM BROMIDE AND ALBUTEROL SULFATE 3 MILLILITER(S): .5; 2.5 SOLUTION RESPIRATORY (INHALATION) at 04:15

## 2024-12-12 RX ADMIN — Medication 1 APPLICATION(S): at 06:44

## 2024-12-12 RX ADMIN — IPRATROPIUM BROMIDE AND ALBUTEROL SULFATE 3 MILLILITER(S): .5; 2.5 SOLUTION RESPIRATORY (INHALATION) at 15:31

## 2024-12-12 RX ADMIN — ACETYLCYSTEINE 4 MILLILITER(S): 200 INHALANT RESPIRATORY (INHALATION) at 11:39

## 2024-12-12 RX ADMIN — ACETYLCYSTEINE 4 MILLILITER(S): 200 INHALANT RESPIRATORY (INHALATION) at 05:10

## 2024-12-12 RX ADMIN — OFLOXACIN 1 DROP(S): 3 SOLUTION OPHTHALMIC at 18:30

## 2024-12-12 RX ADMIN — Medication 1 DROP(S): at 00:01

## 2024-12-12 RX ADMIN — Medication 1 DROP(S): at 07:07

## 2024-12-12 RX ADMIN — IPRATROPIUM BROMIDE AND ALBUTEROL SULFATE 3 MILLILITER(S): .5; 2.5 SOLUTION RESPIRATORY (INHALATION) at 10:38

## 2024-12-12 RX ADMIN — Medication 2 TABLET(S): at 18:31

## 2024-12-12 RX ADMIN — OFLOXACIN 1 DROP(S): 3 SOLUTION OPHTHALMIC at 06:44

## 2024-12-12 RX ADMIN — HEPARIN SODIUM 5000 UNIT(S): 1000 INJECTION INTRAVENOUS; SUBCUTANEOUS at 22:29

## 2024-12-12 RX ADMIN — Medication 1 DROP(S): at 19:43

## 2024-12-12 RX ADMIN — Medication 1 DROP(S): at 11:39

## 2024-12-12 RX ADMIN — POLYETHYLENE GLYCOL 3350 17 GRAM(S): 17 POWDER, FOR SOLUTION ORAL at 06:43

## 2024-12-12 RX ADMIN — Medication 1 DROP(S): at 15:30

## 2024-12-12 RX ADMIN — OFLOXACIN 1 DROP(S): 3 SOLUTION OPHTHALMIC at 11:42

## 2024-12-12 RX ADMIN — Medication 80 MILLIGRAM(S): at 15:32

## 2024-12-12 RX ADMIN — Medication 80 MILLIGRAM(S): at 22:29

## 2024-12-12 RX ADMIN — ACETYLCYSTEINE 4 MILLILITER(S): 200 INHALANT RESPIRATORY (INHALATION) at 18:31

## 2024-12-12 RX ADMIN — Medication 1 APPLICATION(S): at 18:31

## 2024-12-12 RX ADMIN — IPRATROPIUM BROMIDE AND ALBUTEROL SULFATE 3 MILLILITER(S): .5; 2.5 SOLUTION RESPIRATORY (INHALATION) at 22:29

## 2024-12-12 RX ADMIN — Medication 2 TABLET(S): at 05:11

## 2024-12-12 RX ADMIN — POLYETHYLENE GLYCOL 3350 17 GRAM(S): 17 POWDER, FOR SOLUTION ORAL at 18:31

## 2024-12-12 RX ADMIN — Medication 1 DROP(S): at 04:28

## 2024-12-12 RX ADMIN — DOXAZOSIN MESYLATE 8 MILLIGRAM(S): 8 TABLET ORAL at 22:29

## 2024-12-12 RX ADMIN — Medication 80 MILLIGRAM(S): at 05:10

## 2024-12-12 RX ADMIN — HEPARIN SODIUM 5000 UNIT(S): 1000 INJECTION INTRAVENOUS; SUBCUTANEOUS at 05:10

## 2024-12-12 NOTE — PROGRESS NOTE ADULT - ASSESSMENT
44M with unclear PMH (?stroke, MI) who was found down at work, intubated for airway protection and found to have acute parenchymal hemorrhage within nabil with mass effect (+ acute/subacute right cerebellar infarct) in setting of hypertensive emergency, transferred to [facility] for further neurosurgical care. Hospital course c/b poor neurologic recovery s/p trach-PEG, AUR s/p gabriel, ANIKA on CKD c/b hyperkalemia, HAP s/p amp-sulbactam (EOT 10/23), K aerogenes bacteremia  treated with 2 weeks of Cefepime per ID , On 11/15 he became septic and was transferred to NSICU due to increased o2 requirements and needed Vent support , Trach Cultures + for Stenotrophomonas which was treated with 7 days of Bactrim per ID , has been weaned of Vent since 11/23 and is now on 10lts at 40% o2 through Trach Collar     # Pontine hemorrhage  # Encephalopathy due to Intracranial Bleed   - Acute parenchymal hemorrhage within nabil with mass effect (+ acute/subacute right cerebellar infarct) in setting of hypertensive emergency.   - MRI brain also demonstrated acute/subacute R cerebellar stroke.   - No Neurosurgical Interventions were performed   - due to IPH and Cerebellar stroke patient has become Trach and Peg Dependent    # Hypertension  - restarted amlodipine 5mg daily, BPs now better     # Acute kidney injury  # Acute Tubular Necrosis superimposed on CKD. 3  - Pt s/p US renal with chronic medical renal disease  - Elevated BUN , Not on any meds that can elevate BUN , Hgb Stable   - 1 Litre LR bolus on 12/1, Cr improved, today 1.1s within ptn baseline range, ctm     # Chronic respiratory failure.   -Pt s/p percutaneous trach by pulm on 10/14/24  - Continue trach collar   - Continue routine trach care and suctioning PRN  - Chest PT      # Neurogenic dysphagia.   -Pt s/pPEG with surgery 10/21/24  - TF per nutrition  - aspiration precautions and elevation of HOB.    #Acute urinary retention.   - doxazosin 8mg  - Failure TOV on 11/25/24     # Functional quadriplegia in  setting of brainstem hemorrhage  - decub precautions  - care per nursing protocol     # DVT prop  - Heparin SQ q8     # Dispo:  Please step down to Regional Floor

## 2024-12-12 NOTE — PROGRESS NOTE ADULT - SUBJECTIVE AND OBJECTIVE BOX
Patient is a 44y old  Male who presents with a chief complaint of brain stem bleed (12 Dec 2024 02:42)        SUBJECTIVE:  Patient was seen and examined at bedside, no clinical change    Overnight Events : NAEON    Last Bowel Movement: 11-Dec-2024 (12-11)  Last Bowel Movement: 10-Dec-2024 (12-10)  Last Bowel Movement: 10-Dec-2024 (12-10)  Last Bowel Movement: 08-Dec-2024 (12-08)  Last Bowel Movement: 08-Dec-2024 (12-08)  Last Bowel Movement: 06-Dec-2024 (12-06)  Last Bowel Movement: 30-Nov-2024 (12-05)      Review of systems: 12 point Review of systems negative unless otherwise documented elsewhere in note.     Diet, NPO with Tube Feed:   Tube Feeding Modality: Gastrostomy  [place] Renal Support 1.8  Total Volume for 24 Hours (mL): 1080  Total Number of Cans: 5  Continuous  Starting Tube Feed Rate mL per Hour: 45  Increase Tube Feed Rate by (mL): 10     Every 4 hours  Until Goal Tube Feed Rate (mL per Hour): 60  Tube Feed Duration (in Hours): 18  Tube Feed Start Time: 10:00  Tube Feed Stop Time: 04:00  Banatrol TF     Qty per Day:  2 (11-20-24 @ 10:26) [Active]      MEDICATIONS:  MEDICATIONS  (STANDING):  acetylcysteine 10%  Inhalation 4 milliLiter(s) Inhalation every 6 hours  albuterol/ipratropium for Nebulization 3 milliLiter(s) Nebulizer every 6 hours  amLODIPine   Tablet 5 milliGRAM(s) Oral daily  artificial tears (preservative free) Ophthalmic Solution 1 Drop(s) Right EYE every 4 hours  chlorhexidine 2% Cloths 1 Application(s) Topical <User Schedule>  doxazosin 8 milliGRAM(s) Oral at bedtime  erythromycin   Ointment 1 Application(s) Right EYE at bedtime  heparin   Injectable 5000 Unit(s) SubCutaneous every 8 hours  ofloxacin 0.3% Solution 1 Drop(s) Right EYE four times a day  petrolatum Ophthalmic Ointment 1 Application(s) Both EYES two times a day  polyethylene glycol 3350 17 Gram(s) Oral every 12 hours  senna 2 Tablet(s) Oral every 12 hours  simethicone 80 milliGRAM(s) Chew three times a day    MEDICATIONS  (PRN):  acetaminophen   Oral Liquid .. 650 milliGRAM(s) Oral every 6 hours PRN Temp greater or equal to 38.5C (101.3F), Mild Pain (1 - 3)      Allergies    Allergy Status Unknown    Intolerances        OBJECTIVE:  Vital Signs Last 24 Hrs  T(C): 36.1 (12 Dec 2024 09:15), Max: 36.8 (11 Dec 2024 22:13)  T(F): 96.9 (12 Dec 2024 09:15), Max: 98.2 (11 Dec 2024 22:13)  HR: 68 (12 Dec 2024 03:42) (66 - 84)  BP: 125/87 (12 Dec 2024 03:42) (118/79 - 141/96)  BP(mean): 101 (12 Dec 2024 03:42) (94 - 114)  RR: 17 (12 Dec 2024 03:42) (17 - 18)  SpO2: 98% (12 Dec 2024 03:42) (96% - 100%)    Parameters below as of 12 Dec 2024 03:42  Patient On (Oxygen Delivery Method): tracheostomy collar  O2 Flow (L/min): 10  O2 Concentration (%): 40  I&O's Summary    11 Dec 2024 07:01  -  12 Dec 2024 07:00  --------------------------------------------------------  IN: 730 mL / OUT: 1400 mL / NET: -670 mL        PHYSICAL EXAM:  General: awake, looking comfortable, no WOB on trach collar, not tracking horizontally today but tracking vertically  HEENT: tracheostomy clean  Lungs: poor inspiration, no crackles, no wheezes  Heart: regular  Abdomen: soft, no visible tenderness, + BS  Extremities: warm, no edema, not moving to command  LABS:                        11.9   7.88  )-----------( 190      ( 11 Dec 2024 05:30 )             37.6     12-11    138  |  102  |  53[H]  ----------------------------<  115[H]  3.6   |  26  |  1.29    Ca    9.1      11 Dec 2024 05:30  Phos  4.6     12-11  Mg     2.3     12-11          CAPILLARY BLOOD GLUCOSE        Urinalysis Basic - ( 11 Dec 2024 05:30 )    Color: x / Appearance: x / SG: x / pH: x  Gluc: 115 mg/dL / Ketone: x  / Bili: x / Urobili: x   Blood: x / Protein: x / Nitrite: x   Leuk Esterase: x / RBC: x / WBC x   Sq Epi: x / Non Sq Epi: x / Bacteria: x        MICRODATA:      RADIOLOGY/OTHER STUDIES:

## 2024-12-12 NOTE — PROGRESS NOTE ADULT - SUBJECTIVE AND OBJECTIVE BOX
HPI:  Unknown age male, unknown past medical history (reported stroke and MI by coworkers) presented to [facility] with AMS, Pt was working at [employer] when he was found down by coworkers. EMS called and pt brought to [facility] ED. Intubated, sedated, started on cardene for SBPs in 200s. CT head showed brain stem bleed. Transferred to NSICU for further management.  (30 Sep 2024 12:55)    OVERNIGHT EVENTS: GEORGE overnight, neuro stable.      Hospital Course:   9/30: transferred from [facility]. A line placed. Versed dc'd. Cy Rader at bedside, states pt has family in [place], cannot confirm medications or PMH other than stroke and MI. 250cc bolus 3% given. LR switched to NS. hydralazine 25q8 started, 3% started, switched propofol to precedex   10/1: stability CTH done. Added labetalol, started TF. Palliative consulted. ethics consulted to determine surrogate. febrile 103, pan cx sent  10/2: BD 2, GEORGE overnight. TF resumed. Desatt'd to 80s, FiO2 inc. to 50. Fentanyl given, ABG, CXR ordered. Maxxed on precedex, started on propofol for DARIEN -4 - -5. Precedex dc'd. Duonebs, mucomyst, hypertonic added. 3% dc'd. Cardene dc'd. Start vanc/CTX. Increased labetalol 200q8. MRSA negative, dc'd vanc. ETT pulled back 2cm x 2, good positioning after confirmatory chest xray. Ethics attempting to establish HCP with family. Na 159, starting FW 250q6 for range 150-155.   10/3: BD3, GEORGE o/n, neuro stable. Na elevating, FW increased to 300q6. Dc'd bowel reg for diarrhea. vEEG started. SQH 5000q8 tonight.   10/4: BD 4, albumn bolus, incr. LR to 80 2/2 incr. in Cr, LR to 100cc/hr for uptrending Cr. Started 7% hypersal for 48hrs and SL atropine for inline/oral thick secretions. Dc'd CTX and started ancef for MSSA in the sputum. Nephrology consulted for CKD, f/u recs. SBP 170s, given hydralazine 10mg IVP.   10/5: BD5, o/n 10mg IVP hydralazine given for SBP 170s and started on hydralazine 25q8 via OGT. 10mg IV push labetalol for SBP > 160s. RT placed for diarrhea.   10/6: BD6, o/n FW increased to 350q4 per nephrology recs. IV tylenol for temp 100.6, SBp 160s presumed uncomfortable.   10/7: BD7, overnight pancultured for temp 101.8F.   10/8: BD8. GEORGE. Cr bumped. decreased LR to 75cc/hr. Adding simethicone ATC. incr hydralazine 50mgTID. Incr labetalol 300mgTID. Na 145, decreased FWF to 250q6. Start precedex. FENa consistent with intrinsic kidney injury. Pend repeat renal US. Retaining up to 1.3L, bladder scans q6, straight cath PRN  10/9: BD 9. GEORGE overnight. Neuro stable. abd xray for distention w non-specific gas pattern, OGT to LIWS for morning. duonebs/mucomyst to q8 for improving secretions. Changed tube feeds to Jevity 1.5 20cc/hr, low rate due to abdominal distention, nepro dense and more difficult to digest. Tolerating CPAP, confirmed by ABG.   10/10: BD 10. GEORGE overnight. Neuro stable. (+) gabriel for urinary retention on bladder scan. inc TF to goal rate of 40cc/hr. family leaning toward pursuing trach/PEG. 1/2 amp for FS 81.   10/11: BD 11. GEORGE overnight. Neuro stable. Trach/PEG consults placed.   10/12: BD 12. GEORGE overnight. Neuro stable. MRI brain complete.   10/13: BD 13. Increase flomax. Hold SQH after PM dose for trach tm. IVL.   10/14: BD 14. GEORGE overnight, remains on AC/VC. Gabriel placed for urinary retention. Dc'd free water.  S/p trach with pulm. NGT placed and CXR confirmed in good position.   10/15: BD 15, GEORGE ovn. resumed feeds. spiked 101, pan cx sent.   10/16: BD 16. GEORGE ovn. Lokelma 5mg for K+ 5.4. Started vanc q 24/zosyn for empiric PNA coverage, IVF to 100/hr. PEG held for fever.   10/17: BD 17,  ordered serum osm and urine osm for am. Started sinemet for neurostimulation. Increased cardura to 0.8. Started FW 100q4, dc'd IVF. MRSA negative, dc'd vanc. NGT replaced d/t coiling.   10/18: BD 18, GEORGE overnight, neuro stable. Amantadine added for neurostim. zosyn changed to unasyn for acinetobacter baumannii, failed TOV and required SC  10/19: BD 19, GEORGE ovn. cardura 2mg added for retention. labetalol decreased 200q8, hydralazine decreased 25q8. Gabriel replaced.   10/20: BD20, GEORGE overnight. NGT dislodged, replaced. PEG tomorrow w/ gen surg, FW increased to 150q4 and labetalol decreased to 100q8, lokelma given for hyperkalemia.   10/21: BD 21. POD0 PEG placement with Gen surg. decr labetolol to 50q8, incr. cardura to 0.4, started lokelma and phoslo, dc gabriel POD0 PEG placement with Gen surg.  10/22: BD 22. Plan to start TF today via PEG. dc labetalol, Following ophtho recs. Increased apnea settings - found to be in cheyne-moe respiration. CPAP 5/5.  10/23: BD 23. hydralazine d/c'd, trach collar trial today. Rectal tube placed at 6am.  10/24: BD24, o/n lokelma held due to diarrhea. Free water 100q6 resumed. dc'd tamsulosin, amantadine. Incr'd cardura to 8mg qhs. Dc'd FW. Switched jevity to nepro. gabriel placed for high urine output. Started SL atropine for oral secretions. Dc'd free water.  10/25: BD25, o/n decreased suctioning requirements to > q4hrs, GEORGE. Cr improving, cont phoslo, lokelma held at this time. Gabriel placed yest, cont. Tolerating trach collar. Given 500cc plasmalyte bolus for ANIKA. Dc'd sinemet.   10/26: BD26, o/n resumed lokelma 5mg daily and resumed 100cc free water q6hrs. Change in neuro status with new right pupillary dilation with anisocoria (right pupil 6mm fixed and left pupil 3mm briskly reactive). Given 23.4% NaCl bullet, taken for emergent CTH showing mostly resolved pontine hemorrhage, continued brainstem hypodensity likely edema d/t hemorrhage, no new hemorrhage or infarct, no herniation, mild increase in size of left lateral ventricle. Vitals remaine stable. Na goal > 140.   10/27: BD27, o/n GEORGE.Neuro stable. Pend stepdown with airway bed.   10/28: BD 28. GEORGE overnight. Neuro stable. Miralax ordered. Gabriel removed, pending TOV.  10/29: BD 29. GEORGE o/n. Given 2L NS over 8 hrs for increased BUN/Cr ratio. Gabriel placed for frequent straight cath.   10/30: BD 30.   10/31: BD 31. GEORGE overnight. Na 149, increased free water to 200q6. 1L NS for uptrending BUN.   11/1: BD 32. GEORGE overnight. Given 1L NS for dehydration. Na 146, increased FW to 250q6.   11/2: BD 33 GEORGE overnight, neuro stable, given 500cc bolus for net negative status and tachycardia   11/3: BD 34, GEORGE overnight, neuro stable. Patient remains tachycardic, EKG showing sinus tachycardia, given additional 500cc NS bolus. Febrile to 101.9F, pan cultured (without UA), CXR WNL, given tylenol.   11/4: BD 35, GEORGE overnight, neuro stable. Given 1L NS for tachycardia. sputum (+) for stenotropohomas maltophilia.   11/5: BD 36 GEORGE overnight, neuro stable. Vancomycin dc'd. Chest PT BID. ID consulted, cont zosyn.  11/6: BD 37. blood cx + klebsiella dc zosyn changed to cefepime, CTAP ordered, rpt blood cx sent.    11/7: BD 38. Pending CT A/P, given 250cc bolus and starting maintenance fluids overnight. Pending CT A/P after bolus   11/8: BD 39. CT CAP negative for infection.   11/9: BD 40. GEORGE overnight.  11/10: BD 41. GEORGE overnight. desat to 85 on trach collar, O2 inc to 10L and 100%, O2 sat inc to 95. pt tachy to 110s, euvolemic. given tylenol. ABG and CXR ordered. spiked fever, pancultured, RVP negative. AM ABG w pO2 79, rpt w pO2 79. pt appears comfortable, satting 94%.   11/11: BD 42. GEORGE overnight. pt became tachy to 130s, desat to 90 on 100% FiO2 and 10L. suctioned, (+) productive cough. temp 101.4, given 1g IV tylenol and 500cc NS bolus for euvolemia. fever and HR downtrending. LE dopplers negative for dvt  11/12: BD 43, GEORGE ovn, fever and HR downtrending, satting 97% 70% FIO2  11/13: BD 44, GEORGE ovn. started standing tylenol x24 hours for tachycardia. desat to 80s, o2 increased. CXR stable, pending CTA PE protocol.   11/14: BD 45, GEORGE overnight, neuro stable. resp therapy dec FiO2 to 70%.   11/15: BD 46, GEORGE overnight, neuro stable.  Rapid called for desaturation 30s, tachycardic 140s. Patient bagged, 100% fio2, heavily suctioned. CXR/POCUS unremarkable. ABG c/w desaturation. WBC 14.71. Afebrile. O2 improved to 90s and patient upgraded to ICU. ABG paO2 30s improved to 89 on vent. IV Tylenol x 1, sputum sent. Start protonix while o-n vent.   11/16: BD 47. POCUS showed collapsable IVCF, given 1L bolus. Vanco/Cefpime added empriic for PNA, NGT feeds restarted. MRSA swab neg, Vanc DC'd.   11/17: BD 48. GEORGE overnight. 1l bolus for tachycardia. Spiked to 101, cultured. 500cc bolus for tachycardia, tachy to 148 given 25mcg fentanyl, 250cc albumin, 1.5L bolus. 5 IV lopressor with response HR to 100s. +Stenotrophomonas on sputum cx.   11/18: BD 49. GEORGE overnight. Consulted ID, cefepime switched to bactrim x7days. Started hydrochlorothiazine 12.5mg daily.  11/19: BD 50. Tachy 120s, given tylenol and 500cc NS. Tolerating 5/5, switched to TCx. Holding phos binder. D/c Bactrim. D/c gabriel, f/u TOV. Dc'd PPI.   11/20: BD 51. GEORGE ovn. 1600 satting low 90s, mildly tachy to 110s, afebrile, RR wnl. O2 improved to mid 90s while inc O2 to 100% on TCx. CPAP 5/5 placed back on.  11/21: BD 52, GEORGE ovn, tolerating CPAP 5/5. Switch to trach collar during the day if tolerating well. HCTZ held for Cr bump, straight cath frequence increased to q4  11/22: BD 53, GEORGE ovn. Resumed phoslo. Gabriel placed. Resumed HCTZ.   11/23: BD 54. Holding tylenol in setting of possible fever, will require pan cx if febrile. Cr improved today. Cont CPAP. Bowel regimen held i/s/o diarrhea. FOBT negative.  11/24: BD 55. GEORGE overnight. Neuro stable. HCTZ dc'ed, started lisinopril 5. Lokelma dc'ed for K 3.7.   11/25: BD 56, GEORGE overnight. Neuro stable. dc'd lisinopril 5mg. Gabriel dc'd. TOV. 1545 noted to be hypotensive, MAP 50, in supine position on chair, HR 60s, afebrile, O2 96%. Given 1L cc NS bolus, placed back on bed in reverse trendelenberg, improved to map of 66. Neostick at bedside. Vitals check q1h. Dc'ed amlodipine. Failed TOV, bladder scan q6, sc prn. Added back Senna.   11/26: BD 57, GEORGE overnight. Neuro stable. Dc'd phoslo.   11/27: BD 58, GEORGE overnight. Neuro stable.    11/28: BD 59. Gabriel replaced.   11/29: GEORGE.  11/30: GEORGE, neuro stable.   12/1: BD 62, GEORGE overnight  12/2: BD 63, GEORGE overnight.; Given 1L bolus of LR for uptrending BUN/Cr.  12/3: BD 64. Reinstated eye gtt/moisture chamber given increased Rt eye injection  12/4: BD 65. GEORGE overnight. Attempted to speak with ophtho regarding eyelid weight/closure but no answer, full mailbox.   12/5: BD 66, GEORGE overnight, bowel regimen increased and had BM.   12/6: BD 67, GEORGE overnight, neuro stable.  12/7: GEORGE overnight, neuro stable. /110s, given x1 hydralazine 10 mg IVP. Restarted home amlodipine 5mg.  12/8: GEORGE. OOB to chair.     12/11: GEORGE, mucomyst added for thick secretions, simethicone for abd distension, abd xray with stool burden, increased bowel regimen.   12/12: GEORGE overnight.     Vital Signs Last 24 Hrs  T(C): 36.8 (11 Dec 2024 22:13), Max: 36.8 (11 Dec 2024 22:13)  T(F): 98.2 (11 Dec 2024 22:13), Max: 98.2 (11 Dec 2024 22:13)  HR: 84 (11 Dec 2024 23:30) (66 - 84)  BP: 132/92 (11 Dec 2024 23:30) (116/76 - 141/96)  BP(mean): 105 (11 Dec 2024 23:30) (90 - 114)  RR: 18 (11 Dec 2024 23:30) (16 - 18)  SpO2: 96% (11 Dec 2024 23:30) (96% - 100%)    Parameters below as of 11 Dec 2024 23:30  Patient On (Oxygen Delivery Method): tracheostomy collar  O2 Flow (L/min): 10  O2 Concentration (%): 40    I&O's Summary    10 Dec 2024 07:01  -  11 Dec 2024 07:00  --------------------------------------------------------  IN: 1840 mL / OUT: 1025 mL / NET: 815 mL    11 Dec 2024 07:01  -  12 Dec 2024 02:43  --------------------------------------------------------  IN: 550 mL / OUT: 1100 mL / NET: -550 mL          PHYSICAL EXAM:  Constitutional: awake, alert, resting in bed, NAD.   Respiratory: +trach, non-labored breathing. Normal chest rise.   Cardiovascular: Regular rate and rhythm  Gastrointestinal: +peg,  Soft, nontender, nondistended.  .  Vascular: Extremities warm, no ulcers, no discoloration of skin.   Neurological: Gen: AA&O x 0, eyes opened spontaneously, vertical EOMI only, tracking.     CN II-XII grossly intact.    Motor: RUE wiggles fingers spontaneously, RLE wiggles toes, LUE 0/5, LLE withdraws     Sens: Sensation intact to light touch throughout.    Extremities: warm and well perfused         TUBES/LINES:  [] Gabriel  [] Lumbar Drain  [] Wound Drains  [] Others  [x] trach  [x] peg     DIET:  [] NPO  [] Mechanical  [x] Tube feeds    LABS:                        11.9   7.88  )-----------( 190      ( 11 Dec 2024 05:30 )             37.6     12-11    138  |  102  |  53[H]  ----------------------------<  115[H]  3.6   |  26  |  1.29    Ca    9.1      11 Dec 2024 05:30  Phos  4.6     12-11  Mg     2.3     12-11        Urinalysis Basic - ( 11 Dec 2024 05:30 )    Color: x / Appearance: x / SG: x / pH: x  Gluc: 115 mg/dL / Ketone: x  / Bili: x / Urobili: x   Blood: x / Protein: x / Nitrite: x   Leuk Esterase: x / RBC: x / WBC x   Sq Epi: x / Non Sq Epi: x / Bacteria: x          CAPILLARY BLOOD GLUCOSE          Drug Levels: [] N/A    CSF Analysis: [] N/A      Allergies    Allergy Status Unknown    Intolerances      MEDICATIONS:  Antibiotics:    Neuro:  acetaminophen   Oral Liquid .. 650 milliGRAM(s) Oral every 6 hours PRN    Anticoagulation:  heparin   Injectable 5000 Unit(s) SubCutaneous every 8 hours    OTHER:  acetylcysteine 10%  Inhalation 4 milliLiter(s) Inhalation every 6 hours  albuterol/ipratropium for Nebulization 3 milliLiter(s) Nebulizer every 6 hours  amLODIPine   Tablet 5 milliGRAM(s) Oral daily  artificial tears (preservative free) Ophthalmic Solution 1 Drop(s) Right EYE every 4 hours  chlorhexidine 2% Cloths 1 Application(s) Topical <User Schedule>  doxazosin 8 milliGRAM(s) Oral at bedtime  erythromycin   Ointment 1 Application(s) Right EYE at bedtime  ofloxacin 0.3% Solution 1 Drop(s) Right EYE four times a day  petrolatum Ophthalmic Ointment 1 Application(s) Both EYES two times a day  polyethylene glycol 3350 17 Gram(s) Oral every 12 hours  senna 2 Tablet(s) Oral every 12 hours  simethicone 80 milliGRAM(s) Chew three times a day    IVF:    CULTURES:    RADIOLOGY & ADDITIONAL TESTS:      ASSESSMENT:  43 y/o PMH ?stroke/MI present to [facility] after collapsing at work. Decorticate posturing, vomiting, intubated for airway protection. 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 Transferred to [facility] for further management. s/p trach 10/14. s/p peg 10/21. Re-upgrade to ICU 2/2 desaturation event and suctioning requirements 11/15.     AMS    Handoff    MEWS Score    Acute myocardial infarction    CVA (cerebral vascular accident)    Intracerebral hemorrhage of brain stem    Brainstem stroke    Brain stem stroke syndrome    Brain stem hemorrhage    Brain stem stroke syndrome    Hemorrhagic stroke    Brainstem stroke    Encephalopathy acute    Functional quadriplegia    Advanced care planning/counseling discussion    Encounter for palliative care    Pontine hemorrhage    Neurogenic dysphagia    Chronic respiratory failure    Acute kidney injury superimposed on CKD    Acute urinary retention    Hypertensive emergency    Sepsis, unspecified organism    Sepsis    Gram-negative bacteremia    Percutaneous tracheal puncture    Altered mental status examination    EGD, with PEG    AMS    [email or web address]        PLAN:  Neuro:  - neuro/vitals q4h, protected sleep time 10PM-5AM  - pain control: tylenol prn  - vEEG (10/3-4)- negative, (10/17-10/19) - negative  - CTH 9/30: enlarged pontine hemorrhage, CTH 10/3: stable, CTH 10/25: mostly resolved pontine hemorrhage  - MRI brain 10/12: parenchymal hemorrhage, acute/subacute R cerebellar stroke    - stroke core measures, stroke neuro signed off    CV:  - -160  - HTN: amlodipine 5mg, hold lisinopril 5mg   - echo (9/30) EF 75%    Resp:  - trach collar   - Secretions: duonebs/mucomyst/chest PT q8h   - CTA chest 11/13 negative for PE, inc ground glass opacities    GI:  - TF via PEG (placed 10/21 by gen surg)  - bowel regimen, last BM 12/10    Renal:  - IVL; FW flushes 250cc q6hrs   - Urinary retenion: Gabriel replaced 11/28  - CKD: trend BUN/Cr  - renal US 10/1: echogenicity c/w chronic med renal dz, repeat 10/8: inc renal echogeicity, c/f medical renal disease w increased hydro     Endo:  - A1c 5.4    Heme:  - DVT ppx: SCDs, SQH 5000u q8h   - LE dopplers negative 11/11    ID:  - febrile, last pancx 11/16, MRSA swab neg 11/16   -  s/p Stenotrophomonas maltophilia PNA: s/p Bactrim (11/18-11/19) s/p Cefepime (11/16-11/18)  - 11/3, (+) sputum for stenotrophomas maltophlia, blood cx (+) klebsiella, cefepime 2gq12 (11/6 - 11/12)   - empiric tx: s/p zosyn (11/3-11/6) , s/p vanc  (11/3-11/5)  - S/p Ancef (10/4-10/14) for PNA, and s/p Unasyn (10/18-10/23) +actinobacter baumanii     MISC:  - ophtho consult for keratitis  - erythromycin ointment to rt eye q4hrs, ofloxacin ointment to rt eye QID, artificial tears to rt eye q2hrs, moisture chamber at bedtime    Dispo: tele status, full code, pending placement and guardianship hearing     D/w Dr. D'Amico

## 2024-12-13 PROCEDURE — 99231 SBSQ HOSP IP/OBS SF/LOW 25: CPT

## 2024-12-13 PROCEDURE — 99232 SBSQ HOSP IP/OBS MODERATE 35: CPT

## 2024-12-13 RX ADMIN — IPRATROPIUM BROMIDE AND ALBUTEROL SULFATE 3 MILLILITER(S): .5; 2.5 SOLUTION RESPIRATORY (INHALATION) at 23:08

## 2024-12-13 RX ADMIN — ACETYLCYSTEINE 4 MILLILITER(S): 200 INHALANT RESPIRATORY (INHALATION) at 11:43

## 2024-12-13 RX ADMIN — ACETYLCYSTEINE 4 MILLILITER(S): 200 INHALANT RESPIRATORY (INHALATION) at 01:02

## 2024-12-13 RX ADMIN — HEPARIN SODIUM 5000 UNIT(S): 1000 INJECTION INTRAVENOUS; SUBCUTANEOUS at 07:06

## 2024-12-13 RX ADMIN — HEPARIN SODIUM 5000 UNIT(S): 1000 INJECTION INTRAVENOUS; SUBCUTANEOUS at 23:10

## 2024-12-13 RX ADMIN — Medication 1 APPLICATION(S): at 07:03

## 2024-12-13 RX ADMIN — Medication 1 DROP(S): at 19:14

## 2024-12-13 RX ADMIN — OFLOXACIN 1 DROP(S): 3 SOLUTION OPHTHALMIC at 01:03

## 2024-12-13 RX ADMIN — IPRATROPIUM BROMIDE AND ALBUTEROL SULFATE 3 MILLILITER(S): .5; 2.5 SOLUTION RESPIRATORY (INHALATION) at 12:39

## 2024-12-13 RX ADMIN — IPRATROPIUM BROMIDE AND ALBUTEROL SULFATE 3 MILLILITER(S): .5; 2.5 SOLUTION RESPIRATORY (INHALATION) at 19:14

## 2024-12-13 RX ADMIN — HEPARIN SODIUM 5000 UNIT(S): 1000 INJECTION INTRAVENOUS; SUBCUTANEOUS at 15:54

## 2024-12-13 RX ADMIN — Medication 1 DROP(S): at 09:40

## 2024-12-13 RX ADMIN — ACETYLCYSTEINE 4 MILLILITER(S): 200 INHALANT RESPIRATORY (INHALATION) at 23:08

## 2024-12-13 RX ADMIN — Medication 1 DROP(S): at 01:03

## 2024-12-13 RX ADMIN — IPRATROPIUM BROMIDE AND ALBUTEROL SULFATE 3 MILLILITER(S): .5; 2.5 SOLUTION RESPIRATORY (INHALATION) at 07:02

## 2024-12-13 RX ADMIN — Medication 1 DROP(S): at 11:42

## 2024-12-13 RX ADMIN — OFLOXACIN 1 DROP(S): 3 SOLUTION OPHTHALMIC at 19:50

## 2024-12-13 RX ADMIN — OFLOXACIN 1 DROP(S): 3 SOLUTION OPHTHALMIC at 12:36

## 2024-12-13 RX ADMIN — Medication 1 APPLICATION(S): at 19:54

## 2024-12-13 RX ADMIN — OFLOXACIN 1 DROP(S): 3 SOLUTION OPHTHALMIC at 07:08

## 2024-12-13 RX ADMIN — Medication 2 TABLET(S): at 19:14

## 2024-12-13 RX ADMIN — ACETYLCYSTEINE 4 MILLILITER(S): 200 INHALANT RESPIRATORY (INHALATION) at 19:14

## 2024-12-13 RX ADMIN — ACETYLCYSTEINE 4 MILLILITER(S): 200 INHALANT RESPIRATORY (INHALATION) at 07:02

## 2024-12-13 RX ADMIN — OFLOXACIN 1 DROP(S): 3 SOLUTION OPHTHALMIC at 23:11

## 2024-12-13 RX ADMIN — DOXAZOSIN MESYLATE 8 MILLIGRAM(S): 8 TABLET ORAL at 23:10

## 2024-12-13 RX ADMIN — AMLODIPINE BESYLATE 5 MILLIGRAM(S): 10 TABLET ORAL at 07:03

## 2024-12-13 RX ADMIN — Medication 1 DROP(S): at 23:30

## 2024-12-13 RX ADMIN — Medication 80 MILLIGRAM(S): at 07:03

## 2024-12-13 RX ADMIN — Medication 1 DROP(S): at 20:07

## 2024-12-13 RX ADMIN — ERYTHROMYCIN 1 APPLICATION(S): 5 OINTMENT OPHTHALMIC at 23:30

## 2024-12-13 RX ADMIN — Medication 80 MILLIGRAM(S): at 15:54

## 2024-12-13 RX ADMIN — POLYETHYLENE GLYCOL 3350 17 GRAM(S): 17 POWDER, FOR SOLUTION ORAL at 19:14

## 2024-12-13 RX ADMIN — Medication 80 MILLIGRAM(S): at 23:10

## 2024-12-13 NOTE — PROGRESS NOTE ADULT - SUBJECTIVE AND OBJECTIVE BOX
Patient is a 44y old  Male who presents with a chief complaint of brain stem bleed (13 Dec 2024 00:44)        SUBJECTIVE:  Patient was seen and examined at bedside, no clinical change    Overnight Events : NAEON    Last Bowel Movement: 10-Dec-2024 (12-12)  Last Bowel Movement: 10-Dec-2024 (12-12)  Last Bowel Movement: 11-Dec-2024 (12-11)  Last Bowel Movement: 10-Dec-2024 (12-10)  Last Bowel Movement: 10-Dec-2024 (12-10)  Last Bowel Movement: 08-Dec-2024 (12-08)  Last Bowel Movement: 08-Dec-2024 (12-08)  Last Bowel Movement: 06-Dec-2024 (12-06)      Review of systems: 12 point Review of systems negative unless otherwise documented elsewhere in note.     Diet, NPO with Tube Feed:   Tube Feeding Modality: Gastrostomy  [place] Renal Support 1.8  Total Volume for 24 Hours (mL): 1080  Total Number of Cans: 5  Continuous  Starting Tube Feed Rate mL per Hour: 45  Increase Tube Feed Rate by (mL): 10     Every 4 hours  Until Goal Tube Feed Rate (mL per Hour): 60  Tube Feed Duration (in Hours): 18  Tube Feed Start Time: 10:00  Tube Feed Stop Time: 04:00  Banatrol TF     Qty per Day:  2 (11-20-24 @ 10:26) [Active]      MEDICATIONS:  MEDICATIONS  (STANDING):  acetylcysteine 10%  Inhalation 4 milliLiter(s) Inhalation every 6 hours  albuterol/ipratropium for Nebulization 3 milliLiter(s) Nebulizer every 6 hours  amLODIPine   Tablet 5 milliGRAM(s) Oral daily  artificial tears (preservative free) Ophthalmic Solution 1 Drop(s) Right EYE every 4 hours  chlorhexidine 2% Cloths 1 Application(s) Topical <User Schedule>  doxazosin 8 milliGRAM(s) Oral at bedtime  erythromycin   Ointment 1 Application(s) Right EYE at bedtime  heparin   Injectable 5000 Unit(s) SubCutaneous every 8 hours  ofloxacin 0.3% Solution 1 Drop(s) Right EYE four times a day  petrolatum Ophthalmic Ointment 1 Application(s) Both EYES two times a day  polyethylene glycol 3350 17 Gram(s) Oral every 12 hours  senna 2 Tablet(s) Oral every 12 hours  simethicone 80 milliGRAM(s) Chew three times a day    MEDICATIONS  (PRN):  acetaminophen   Oral Liquid .. 650 milliGRAM(s) Oral every 6 hours PRN Temp greater or equal to 38.5C (101.3F), Mild Pain (1 - 3)      Allergies    Allergy Status Unknown    Intolerances        OBJECTIVE:  Vital Signs Last 24 Hrs  T(C): 37.3 (13 Dec 2024 09:13), Max: 37.3 (13 Dec 2024 05:06)  T(F): 99.1 (13 Dec 2024 09:13), Max: 99.2 (13 Dec 2024 05:06)  HR: 68 (13 Dec 2024 08:58) (66 - 95)  BP: 131/88 (13 Dec 2024 08:38) (116/83 - 150/109)  BP(mean): 104 (13 Dec 2024 08:38) (94 - 124)  RR: 18 (13 Dec 2024 08:58) (16 - 18)  SpO2: 100% (13 Dec 2024 08:58) (94% - 100%)    Parameters below as of 13 Dec 2024 08:58  Patient On (Oxygen Delivery Method): tracheostomy collar    O2 Concentration (%): 40  I&O's Summary    12 Dec 2024 07:01  -  13 Dec 2024 07:00  --------------------------------------------------------  IN: 2190 mL / OUT: 1600 mL / NET: 590 mL    13 Dec 2024 07:01  -  13 Dec 2024 11:31  --------------------------------------------------------  IN: 0 mL / OUT: 750 mL / NET: -750 mL        PHYSICAL EXAM:  General: awake, looking comfortable, no WOB on trach collar, not tracking horizontally today but tracking vertically  HEENT: tracheostomy clean  Lungs: poor inspiration, no crackles, no wheezes  Heart: regular  Abdomen: soft, no visible tenderness, + BS  Extremities: warm, no edema, not moving to commandinterview    LABS:              CAPILLARY BLOOD GLUCOSE            MICRODATA:      RADIOLOGY/OTHER STUDIES:

## 2024-12-13 NOTE — PROGRESS NOTE ADULT - SUBJECTIVE AND OBJECTIVE BOX
HPI:  Unknown age male, unknown past medical history (reported stroke and MI by coworkers) presented to [facility] with AMS, Pt was working at [employer] when he was found down by coworkers. EMS called and pt brought to [facility] ED. Intubated, sedated, started on cardene for SBPs in 200s. CT head showed brain stem bleed. Transferred to NSICU for further management.  (30 Sep 2024 12:55)    OVERNIGHT EVENTS: GEORGE overnight, neuro stable.     Hospital Course:  9/30: transferred from [facility]. A line placed. Versed dc'd. Cy Rader at bedside, states pt has family in [place], cannot confirm medications or PMH other than stroke and MI. 250cc bolus 3% given. LR switched to NS. hydralazine 25q8 started, 3% started, switched propofol to precedex   10/1: stability CTH done. Added labetalol, started TF. Palliative consulted. ethics consulted to determine surrogate. febrile 103, pan cx sent  10/2: BD 2, GEORGE overnight. TF resumed. Desatt'd to 80s, FiO2 inc. to 50. Fentanyl given, ABG, CXR ordered. Maxxed on precedex, started on propofol for DARIEN -4 - -5. Precedex dc'd. Duonebs, mucomyst, hypertonic added. 3% dc'd. Cardene dc'd. Start vanc/CTX. Increased labetalol 200q8. MRSA negative, dc'd vanc. ETT pulled back 2cm x 2, good positioning after confirmatory chest xray. Ethics attempting to establish HCP with family. Na 159, starting FW 250q6 for range 150-155.   10/3: BD3, GEORGE o/n, neuro stable. Na elevating, FW increased to 300q6. Dc'd bowel reg for diarrhea. vEEG started. SQH 5000q8 tonight.   10/4: BD 4, albumn bolus, incr. LR to 80 2/2 incr. in Cr, LR to 100cc/hr for uptrending Cr. Started 7% hypersal for 48hrs and SL atropine for inline/oral thick secretions. Dc'd CTX and started ancef for MSSA in the sputum. Nephrology consulted for CKD, f/u recs. SBP 170s, given hydralazine 10mg IVP.   10/5: BD5, o/n 10mg IVP hydralazine given for SBP 170s and started on hydralazine 25q8 via OGT. 10mg IV push labetalol for SBP > 160s. RT placed for diarrhea.   10/6: BD6, o/n FW increased to 350q4 per nephrology recs. IV tylenol for temp 100.6, SBp 160s presumed uncomfortable.   10/7: BD7, overnight pancultured for temp 101.8F.   10/8: BD8. GEORGE. Cr bumped. decreased LR to 75cc/hr. Adding simethicone ATC. incr hydralazine 50mgTID. Incr labetalol 300mgTID. Na 145, decreased FWF to 250q6. Start precedex. FENa consistent with intrinsic kidney injury. Pend repeat renal US. Retaining up to 1.3L, bladder scans q6, straight cath PRN  10/9: BD 9. GEORGE overnight. Neuro stable. abd xray for distention w non-specific gas pattern, OGT to LIWS for morning. duonebs/mucomyst to q8 for improving secretions. Changed tube feeds to Jevity 1.5 20cc/hr, low rate due to abdominal distention, nepro dense and more difficult to digest. Tolerating CPAP, confirmed by ABG.   10/10: BD 10. GEORGE overnight. Neuro stable. (+) gabriel for urinary retention on bladder scan. inc TF to goal rate of 40cc/hr. family leaning toward pursuing trach/PEG. 1/2 amp for FS 81.   10/11: BD 11. GEORGE overnight. Neuro stable. Trach/PEG consults placed.   10/12: BD 12. GEORGE overnight. Neuro stable. MRI brain complete.   10/13: BD 13. Increase flomax. Hold SQH after PM dose for trach tm. IVL.   10/14: BD 14. GEORGE overnight, remains on AC/VC. Gabriel placed for urinary retention. Dc'd free water.  S/p trach with pulm. NGT placed and CXR confirmed in good position.   10/15: BD 15, GEORGE ovn. resumed feeds. spiked 101, pan cx sent.   10/16: BD 16. GEORGE ovn. Lokelma 5mg for K+ 5.4. Started vanc q 24/zosyn for empiric PNA coverage, IVF to 100/hr. PEG held for fever.   10/17: BD 17,  ordered serum osm and urine osm for am. Started sinemet for neurostimulation. Increased cardura to 0.8. Started FW 100q4, dc'd IVF. MRSA negative, dc'd vanc. NGT replaced d/t coiling.   10/18: BD 18, GEORGE overnight, neuro stable. Amantadine added for neurostim. zosyn changed to unasyn for acinetobacter baumannii, failed TOV and required SC  10/19: BD 19, GEORGE ovn. cardura 2mg added for retention. labetalol decreased 200q8, hydralazine decreased 25q8. Gabriel replaced.   10/20: BD20, GEORGE overnight. NGT dislodged, replaced. PEG tomorrow w/ gen surg, FW increased to 150q4 and labetalol decreased to 100q8, lokelma given for hyperkalemia.   10/21: BD 21. POD0 PEG placement with Gen surg. decr labetolol to 50q8, incr. cardura to 0.4, started lokelma and phoslo, dc gabriel POD0 PEG placement with Gen surg.  10/22: BD 22. Plan to start TF today via PEG. dc labetalol, Following ophtho recs. Increased apnea settings - found to be in cheyne-moe respiration. CPAP 5/5.  10/23: BD 23. hydralazine d/c'd, trach collar trial today. Rectal tube placed at 6am.  10/24: BD24, o/n lokelma held due to diarrhea. Free water 100q6 resumed. dc'd tamsulosin, amantadine. Incr'd cardura to 8mg qhs. Dc'd FW. Switched jevity to nepro. gabriel placed for high urine output. Started SL atropine for oral secretions. Dc'd free water.  10/25: BD25, o/n decreased suctioning requirements to > q4hrs, GEORGE. Cr improving, cont phoslo, lokelma held at this time. Gabriel placed yest, cont. Tolerating trach collar. Given 500cc plasmalyte bolus for ANIKA. Dc'd sinemet.   10/26: BD26, o/n resumed lokelma 5mg daily and resumed 100cc free water q6hrs. Change in neuro status with new right pupillary dilation with anisocoria (right pupil 6mm fixed and left pupil 3mm briskly reactive). Given 23.4% NaCl bullet, taken for emergent CTH showing mostly resolved pontine hemorrhage, continued brainstem hypodensity likely edema d/t hemorrhage, no new hemorrhage or infarct, no herniation, mild increase in size of left lateral ventricle. Vitals remaine stable. Na goal > 140.   10/27: BD27, o/n GEORGE.Neuro stable. Pend stepdown with airway bed.   10/28: BD 28. GEORGE overnight. Neuro stable. Miralax ordered. Gabriel removed, pending TOV.  10/29: BD 29. GEORGE o/n. Given 2L NS over 8 hrs for increased BUN/Cr ratio. Gabriel placed for frequent straight cath.   10/30: BD 30.   10/31: BD 31. GEORGE overnight. Na 149, increased free water to 200q6. 1L NS for uptrending BUN.   11/1: BD 32. GEORGE overnight. Given 1L NS for dehydration. Na 146, increased FW to 250q6.   11/2: BD 33 GEORGE overnight, neuro stable, given 500cc bolus for net negative status and tachycardia   11/3: BD 34, GEORGE overnight, neuro stable. Patient remains tachycardic, EKG showing sinus tachycardia, given additional 500cc NS bolus. Febrile to 101.9F, pan cultured (without UA), CXR WNL, given tylenol.   11/4: BD 35, GEORGE overnight, neuro stable. Given 1L NS for tachycardia. sputum (+) for stenotropohomas maltophilia.   11/5: BD 36 GEORGE overnight, neuro stable. Vancomycin dc'd. Chest PT BID. ID consulted, cont zosyn.  11/6: BD 37. blood cx + klebsiella dc zosyn changed to cefepime, CTAP ordered, rpt blood cx sent.    11/7: BD 38. Pending CT A/P, given 250cc bolus and starting maintenance fluids overnight. Pending CT A/P after bolus   11/8: BD 39. CT CAP negative for infection.   11/9: BD 40. GEORGE overnight.  11/10: BD 41. GEORGE overnight. desat to 85 on trach collar, O2 inc to 10L and 100%, O2 sat inc to 95. pt tachy to 110s, euvolemic. given tylenol. ABG and CXR ordered. spiked fever, pancultured, RVP negative. AM ABG w pO2 79, rpt w pO2 79. pt appears comfortable, satting 94%.   11/11: BD 42. GEORGE overnight. pt became tachy to 130s, desat to 90 on 100% FiO2 and 10L. suctioned, (+) productive cough. temp 101.4, given 1g IV tylenol and 500cc NS bolus for euvolemia. fever and HR downtrending. LE dopplers negative for dvt  11/12: BD 43, GEORGE ovn, fever and HR downtrending, satting 97% 70% FIO2  11/13: BD 44, GEORGE ovn. started standing tylenol x24 hours for tachycardia. desat to 80s, o2 increased. CXR stable, pending CTA PE protocol.   11/14: BD 45, GEORGE overnight, neuro stable. resp therapy dec FiO2 to 70%.   11/15: BD 46, GEORGE overnight, neuro stable.  Rapid called for desaturation 30s, tachycardic 140s. Patient bagged, 100% fio2, heavily suctioned. CXR/POCUS unremarkable. ABG c/w desaturation. WBC 14.71. Afebrile. O2 improved to 90s and patient upgraded to ICU. ABG paO2 30s improved to 89 on vent. IV Tylenol x 1, sputum sent. Start protonix while o-n vent.   11/16: BD 47. POCUS showed collapsable IVCF, given 1L bolus. Vanco/Cefpime added empriic for PNA, NGT feeds restarted. MRSA swab neg, Vanc DC'd.   11/17: BD 48. GEORGE overnight. 1l bolus for tachycardia. Spiked to 101, cultured. 500cc bolus for tachycardia, tachy to 148 given 25mcg fentanyl, 250cc albumin, 1.5L bolus. 5 IV lopressor with response HR to 100s. +Stenotrophomonas on sputum cx.   11/18: BD 49. GEORGE overnight. Consulted ID, cefepime switched to bactrim x7days. Started hydrochlorothiazine 12.5mg daily.  11/19: BD 50. Tachy 120s, given tylenol and 500cc NS. Tolerating 5/5, switched to TCx. Holding phos binder. D/c Bactrim. D/c gabriel, f/u TOV. Dc'd PPI.   11/20: BD 51. GEORGE ovn. 1600 satting low 90s, mildly tachy to 110s, afebrile, RR wnl. O2 improved to mid 90s while inc O2 to 100% on TCx. CPAP 5/5 placed back on.  11/21: BD 52, GEORGE ovn, tolerating CPAP 5/5. Switch to trach collar during the day if tolerating well. HCTZ held for Cr bump, straight cath frequence increased to q4  11/22: BD 53, GEORGE ovn. Resumed phoslo. Gabriel placed. Resumed HCTZ.   11/23: BD 54. Holding tylenol in setting of possible fever, will require pan cx if febrile. Cr improved today. Cont CPAP. Bowel regimen held i/s/o diarrhea. FOBT negative.  11/24: BD 55. GEORGE overnight. Neuro stable. HCTZ dc'ed, started lisinopril 5. Lokelma dc'ed for K 3.7.   11/25: BD 56, GEORGE overnight. Neuro stable. dc'd lisinopril 5mg. Gabriel dc'd. TOV. 1545 noted to be hypotensive, MAP 50, in supine position on chair, HR 60s, afebrile, O2 96%. Given 1L cc NS bolus, placed back on bed in reverse trendelenberg, improved to map of 66. Neostick at bedside. Vitals check q1h. Dc'ed amlodipine. Failed TOV, bladder scan q6, sc prn. Added back Senna.   11/26: BD 57, GEORGE overnight. Neuro stable. Dc'd phoslo.   11/27: BD 58, GEORGE overnight. Neuro stable.    11/28: BD 59. Gabriel replaced.   11/29: GEORGE.  11/30: GEORGE, neuro stable.   12/1: BD 62, GEORGE overnight  12/2: BD 63, GEOREG overnight.; Given 1L bolus of LR for uptrending BUN/Cr.  12/3: BD 64. Reinstated eye gtt/moisture chamber given increased Rt eye injection  12/4: BD 65. GEORGE overnight. Attempted to speak with ophtho regarding eyelid weight/closure but no answer, full mailbox.   12/5: BD 66, GEORGE overnight, bowel regimen increased and had BM.   12/6: BD 67, GEORGE overnight, neuro stable.  12/7: GEORGE overnight, neuro stable. /110s, given x1 hydralazine 10 mg IVP. Restarted home amlodipine 5mg.  12/8: GEORGE. OOB to chair.     12/11: GEORGE, mucomyst added for thick secretions, simethicone for abd distension, abd xray with stool burden, increased bowel regimen.   12/12: GEORGE overnight.   12/13: GEORGE overnight.     Vital Signs Last 24 Hrs  T(C): 37 (12 Dec 2024 22:50), Max: 37 (12 Dec 2024 22:50)  T(F): 98.6 (12 Dec 2024 22:50), Max: 98.6 (12 Dec 2024 22:50)  HR: 68 (12 Dec 2024 20:52) (62 - 74)  BP: 140/105 (12 Dec 2024 20:52) (125/87 - 150/109)  BP(mean): 120 (12 Dec 2024 20:52) (101 - 124)  RR: 16 (12 Dec 2024 20:52) (16 - 18)  SpO2: 100% (12 Dec 2024 20:52) (94% - 100%)    Parameters below as of 12 Dec 2024 21:00  Patient On (Oxygen Delivery Method): tracheostomy collar        I&O's Summary    11 Dec 2024 07:01  -  12 Dec 2024 07:00  --------------------------------------------------------  IN: 730 mL / OUT: 1400 mL / NET: -670 mL    12 Dec 2024 07:01  -  13 Dec 2024 00:44  --------------------------------------------------------  IN: 1460 mL / OUT: 850 mL / NET: 610 mL    PHYSICAL EXAM:  Constitutional: awake, alert, resting in bed, NAD.   Respiratory: +trach, non-labored breathing. Normal chest rise.   Cardiovascular: Regular rate and rhythm  Gastrointestinal: +peg,  Soft, nontender, nondistended.  .  Vascular: Extremities warm, no ulcers, no discoloration of skin.   Neurological: Gen: AA&O x 0, eyes opened spontaneously, vertical EOMI only, tracking.     CN II-XII grossly intact.    Motor: RUE wiggles fingers spontaneously, RLE wiggles toes, LUE 0/5, LLE withdraws     Sens: Sensation intact to light touch throughout.    Extremities: warm and well perfused         TUBES/LINES:  [] Gbariel  [] Lumbar Drain  [] Wound Drains  [] Others  [x] trach  [x] peg     DIET:  [] NPO  [] Mechanical  [x] Tube feeds    LABS:                        11.9   7.88  )-----------( 190      ( 11 Dec 2024 05:30 )             37.6     12-11    138  |  102  |  53[H]  ----------------------------<  115[H]  3.6   |  26  |  1.29    Ca    9.1      11 Dec 2024 05:30  Phos  4.6     12-11  Mg     2.3     12-11        Urinalysis Basic - ( 11 Dec 2024 05:30 )    Color: x / Appearance: x / SG: x / pH: x  Gluc: 115 mg/dL / Ketone: x  / Bili: x / Urobili: x   Blood: x / Protein: x / Nitrite: x   Leuk Esterase: x / RBC: x / WBC x   Sq Epi: x / Non Sq Epi: x / Bacteria: x          CAPILLARY BLOOD GLUCOSE          Drug Levels: [] N/A    CSF Analysis: [] N/A      Allergies    Allergy Status Unknown    Intolerances      MEDICATIONS:  Antibiotics:    Neuro:  acetaminophen   Oral Liquid .. 650 milliGRAM(s) Oral every 6 hours PRN    Anticoagulation:  heparin   Injectable 5000 Unit(s) SubCutaneous every 8 hours    OTHER:  acetylcysteine 10%  Inhalation 4 milliLiter(s) Inhalation every 6 hours  albuterol/ipratropium for Nebulization 3 milliLiter(s) Nebulizer every 6 hours  amLODIPine   Tablet 5 milliGRAM(s) Oral daily  artificial tears (preservative free) Ophthalmic Solution 1 Drop(s) Right EYE every 4 hours  chlorhexidine 2% Cloths 1 Application(s) Topical <User Schedule>  doxazosin 8 milliGRAM(s) Oral at bedtime  erythromycin   Ointment 1 Application(s) Right EYE at bedtime  ofloxacin 0.3% Solution 1 Drop(s) Right EYE four times a day  petrolatum Ophthalmic Ointment 1 Application(s) Both EYES two times a day  polyethylene glycol 3350 17 Gram(s) Oral every 12 hours  senna 2 Tablet(s) Oral every 12 hours  simethicone 80 milliGRAM(s) Chew three times a day    IVF:    CULTURES:    RADIOLOGY & ADDITIONAL TESTS:      ASSESSMENT:  45 y/o PMH ?stroke/MI present to [facility] after collapsing at work. Decorticate posturing, vomiting, intubated for airway protection. 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 Transferred to [facility] for further management. s/p trach 10/14. s/p peg 10/21. Re-upgrade to ICU 2/2 desaturation event and suctioning requirements 11/15.     AMS    Handoff    MEWS Score    Acute myocardial infarction    CVA (cerebral vascular accident)    Intracerebral hemorrhage of brain stem    Brainstem stroke    Brain stem stroke syndrome    Brain stem hemorrhage    Brain stem stroke syndrome    Hemorrhagic stroke    Brainstem stroke    Encephalopathy acute    Functional quadriplegia    Advanced care planning/counseling discussion    Encounter for palliative care    Pontine hemorrhage    Neurogenic dysphagia    Chronic respiratory failure    Acute kidney injury superimposed on CKD    Acute urinary retention    Hypertensive emergency    Sepsis, unspecified organism    Sepsis    Gram-negative bacteremia    Percutaneous tracheal puncture    Altered mental status examination    EGD, with PEG    AMS    [email or web address]            PLAN:  Neuro:  - neuro/vitals q4h, protected sleep time 10PM-5AM  - pain control: tylenol prn  - vEEG (10/3-4)- negative, (10/17-10/19) - negative  - CTH 9/30: enlarged pontine hemorrhage, CTH 10/3: stable, CTH 10/25: mostly resolved pontine hemorrhage  - MRI brain 10/12: parenchymal hemorrhage, acute/subacute R cerebellar stroke    - stroke core measures, stroke neuro signed off    CV:  - -160  - HTN: amlodipine 5mg, hold lisinopril 5mg   - echo (9/30) EF 75%    Resp:  - trach collar   - Secretions: duonebs/mucomyst/chest PT q8h   - CTA chest 11/13 negative for PE, inc ground glass opacities    GI:  - TF via PEG (placed 10/21 by gen surg)  - bowel regimen, last BM 12/10    Renal:  - IVL; FW flushes 250cc q6hrs   - Urinary retenion: Gabriel replaced 11/28  - CKD: trend BUN/Cr  - renal US 10/1: echogenicity c/w chronic med renal dz, repeat 10/8: inc renal echogeicity, c/f medical renal disease w increased hydro     Endo:  - A1c 5.4    Heme:  - DVT ppx: SCDs, SQH 5000u q8h   - LE dopplers negative 11/11    ID:  - febrile, last pancx 11/16, MRSA swab neg 11/16   -  s/p Stenotrophomonas maltophilia PNA: s/p Bactrim (11/18-11/19) s/p Cefepime (11/16-11/18)  - 11/3, (+) sputum for stenotrophomas maltophlia, blood cx (+) klebsiella, cefepime 2gq12 (11/6 - 11/12)   - empiric tx: s/p zosyn (11/3-11/6) , s/p vanc  (11/3-11/5)  - S/p Ancef (10/4-10/14) for PNA, and s/p Unasyn (10/18-10/23) +actinobacter baumanii     MISC:  - ophtho consult for keratitis  - erythromycin ointment to rt eye q4hrs, ofloxacin ointment to rt eye QID, artificial tears to rt eye q2hrs, moisture chamber at bedtime    Dispo: tele status, full code, pending placement and guardianship hearing     D/w Dr. D'Amico

## 2024-12-14 LAB
ANION GAP SERPL CALC-SCNC: 13 MMOL/L — SIGNIFICANT CHANGE UP (ref 5–17)
BUN SERPL-MCNC: 43 MG/DL — HIGH (ref 7–23)
CALCIUM SERPL-MCNC: 9.4 MG/DL — SIGNIFICANT CHANGE UP (ref 8.4–10.5)
CHLORIDE SERPL-SCNC: 102 MMOL/L — SIGNIFICANT CHANGE UP (ref 96–108)
CO2 SERPL-SCNC: 24 MMOL/L — SIGNIFICANT CHANGE UP (ref 22–31)
CREAT SERPL-MCNC: 1.17 MG/DL — SIGNIFICANT CHANGE UP (ref 0.5–1.3)
EGFR: 78 ML/MIN/1.73M2 — SIGNIFICANT CHANGE UP
EGFR: 78 ML/MIN/1.73M2 — SIGNIFICANT CHANGE UP
GLUCOSE SERPL-MCNC: 131 MG/DL — HIGH (ref 70–99)
HCT VFR BLD CALC: 36.8 % — LOW (ref 39–50)
HGB BLD-MCNC: 12 G/DL — LOW (ref 13–17)
MAGNESIUM SERPL-MCNC: 2.2 MG/DL — SIGNIFICANT CHANGE UP (ref 1.6–2.6)
MCHC RBC-ENTMCNC: 30.5 PG — SIGNIFICANT CHANGE UP (ref 27–34)
MCHC RBC-ENTMCNC: 32.6 G/DL — SIGNIFICANT CHANGE UP (ref 32–36)
MCV RBC AUTO: 93.6 FL — SIGNIFICANT CHANGE UP (ref 80–100)
NRBC # BLD: 0 /100 WBCS — SIGNIFICANT CHANGE UP (ref 0–0)
NRBC BLD-RTO: 0 /100 WBCS — SIGNIFICANT CHANGE UP (ref 0–0)
PHOSPHATE SERPL-MCNC: 3.9 MG/DL — SIGNIFICANT CHANGE UP (ref 2.5–4.5)
PLATELET # BLD AUTO: 201 K/UL — SIGNIFICANT CHANGE UP (ref 150–400)
POTASSIUM SERPL-MCNC: 3.4 MMOL/L — LOW (ref 3.5–5.3)
POTASSIUM SERPL-SCNC: 3.4 MMOL/L — LOW (ref 3.5–5.3)
RBC # BLD: 3.93 M/UL — LOW (ref 4.2–5.8)
RBC # FLD: 14.6 % — HIGH (ref 10.3–14.5)
SODIUM SERPL-SCNC: 139 MMOL/L — SIGNIFICANT CHANGE UP (ref 135–145)
WBC # BLD: 6.88 K/UL — SIGNIFICANT CHANGE UP (ref 3.8–10.5)
WBC # FLD AUTO: 6.88 K/UL — SIGNIFICANT CHANGE UP (ref 3.8–10.5)

## 2024-12-14 PROCEDURE — 99233 SBSQ HOSP IP/OBS HIGH 50: CPT

## 2024-12-14 PROCEDURE — 99232 SBSQ HOSP IP/OBS MODERATE 35: CPT

## 2024-12-14 RX ADMIN — Medication 2 TABLET(S): at 06:02

## 2024-12-14 RX ADMIN — IPRATROPIUM BROMIDE AND ALBUTEROL SULFATE 3 MILLILITER(S): .5; 2.5 SOLUTION RESPIRATORY (INHALATION) at 03:01

## 2024-12-14 RX ADMIN — Medication 650 MILLIGRAM(S): at 23:37

## 2024-12-14 RX ADMIN — Medication 1 APPLICATION(S): at 06:00

## 2024-12-14 RX ADMIN — Medication 1 DROP(S): at 11:12

## 2024-12-14 RX ADMIN — Medication 80 MILLIGRAM(S): at 21:49

## 2024-12-14 RX ADMIN — ACETYLCYSTEINE 4 MILLILITER(S): 200 INHALANT RESPIRATORY (INHALATION) at 17:53

## 2024-12-14 RX ADMIN — Medication 80 MILLIGRAM(S): at 14:58

## 2024-12-14 RX ADMIN — Medication 80 MILLIGRAM(S): at 06:02

## 2024-12-14 RX ADMIN — Medication 1 APPLICATION(S): at 18:08

## 2024-12-14 RX ADMIN — Medication 1 DROP(S): at 15:56

## 2024-12-14 RX ADMIN — ACETYLCYSTEINE 4 MILLILITER(S): 200 INHALANT RESPIRATORY (INHALATION) at 06:01

## 2024-12-14 RX ADMIN — Medication 1 DROP(S): at 23:20

## 2024-12-14 RX ADMIN — AMLODIPINE BESYLATE 5 MILLIGRAM(S): 10 TABLET ORAL at 06:04

## 2024-12-14 RX ADMIN — Medication 650 MILLIGRAM(S): at 16:32

## 2024-12-14 RX ADMIN — ACETYLCYSTEINE 4 MILLILITER(S): 200 INHALANT RESPIRATORY (INHALATION) at 23:19

## 2024-12-14 RX ADMIN — OFLOXACIN 1 DROP(S): 3 SOLUTION OPHTHALMIC at 23:20

## 2024-12-14 RX ADMIN — HEPARIN SODIUM 5000 UNIT(S): 1000 INJECTION INTRAVENOUS; SUBCUTANEOUS at 06:01

## 2024-12-14 RX ADMIN — HEPARIN SODIUM 5000 UNIT(S): 1000 INJECTION INTRAVENOUS; SUBCUTANEOUS at 21:49

## 2024-12-14 RX ADMIN — ERYTHROMYCIN 1 APPLICATION(S): 5 OINTMENT OPHTHALMIC at 21:48

## 2024-12-14 RX ADMIN — ACETYLCYSTEINE 4 MILLILITER(S): 200 INHALANT RESPIRATORY (INHALATION) at 11:11

## 2024-12-14 RX ADMIN — Medication 1 DROP(S): at 03:01

## 2024-12-14 RX ADMIN — IPRATROPIUM BROMIDE AND ALBUTEROL SULFATE 3 MILLILITER(S): .5; 2.5 SOLUTION RESPIRATORY (INHALATION) at 21:49

## 2024-12-14 RX ADMIN — OFLOXACIN 1 DROP(S): 3 SOLUTION OPHTHALMIC at 11:12

## 2024-12-14 RX ADMIN — OFLOXACIN 1 DROP(S): 3 SOLUTION OPHTHALMIC at 06:01

## 2024-12-14 RX ADMIN — IPRATROPIUM BROMIDE AND ALBUTEROL SULFATE 3 MILLILITER(S): .5; 2.5 SOLUTION RESPIRATORY (INHALATION) at 15:56

## 2024-12-14 RX ADMIN — Medication 1 DROP(S): at 08:15

## 2024-12-14 RX ADMIN — Medication 40 MILLIEQUIVALENT(S): at 08:16

## 2024-12-14 RX ADMIN — Medication 1 DROP(S): at 19:48

## 2024-12-14 RX ADMIN — POLYETHYLENE GLYCOL 3350 17 GRAM(S): 17 POWDER, FOR SOLUTION ORAL at 06:02

## 2024-12-14 RX ADMIN — DOXAZOSIN MESYLATE 8 MILLIGRAM(S): 8 TABLET ORAL at 21:49

## 2024-12-14 RX ADMIN — Medication 650 MILLIGRAM(S): at 16:02

## 2024-12-14 RX ADMIN — IPRATROPIUM BROMIDE AND ALBUTEROL SULFATE 3 MILLILITER(S): .5; 2.5 SOLUTION RESPIRATORY (INHALATION) at 09:25

## 2024-12-14 RX ADMIN — HEPARIN SODIUM 5000 UNIT(S): 1000 INJECTION INTRAVENOUS; SUBCUTANEOUS at 14:58

## 2024-12-14 RX ADMIN — OFLOXACIN 1 DROP(S): 3 SOLUTION OPHTHALMIC at 17:53

## 2024-12-14 NOTE — PROGRESS NOTE ADULT - ASSESSMENT
44M with unclear PMH (?stroke, MI) who was found down at work, intubated for airway protection and found to have acute parenchymal hemorrhage within nabil with mass effect (+ acute/subacute right cerebellar infarct) in setting of hypertensive emergency, transferred to [facility] for further neurosurgical care. Hospital course c/b poor neurologic recovery s/p trach-PEG, AUR s/p gabriel, ANIKA on CKD c/b hyperkalemia, HAP s/p amp-sulbactam (EOT 10/23), K aerogenes bacteremia  treated with 2 weeks of Cefepime per ID , On 11/15 he became septic and was transferred to NSICU due to increased o2 requirements and needed Vent support , Trach Cultures + for Stenotrophomonas which was treated with 7 days of Bactrim per ID , has been weaned of Vent since 11/23 and is now on 10lts at 40% o2 through Trach Collar     # Pontine hemorrhage  # Encephalopathy due to Intracranial Bleed   - Acute parenchymal hemorrhage within nabil with mass effect (+ acute/subacute right cerebellar infarct) in setting of hypertensive emergency.   - MRI brain also demonstrated acute/subacute R cerebellar stroke.   - No Neurosurgical Interventions were performed   - due to IPH and Cerebellar stroke patient has become Trach and Peg Dependent    # Hypertension  - restarted amlodipine 5mg daily, BPs now better     # Acute kidney injury  # Acute Tubular Necrosis superimposed on CKD. 3  - Pt s/p US renal with chronic medical renal disease  - Elevated BUN , Not on any meds that can elevate BUN , Hgb Stable   - 1 Litre LR bolus on 12/1, Cr improved, today 1.1s within ptn baseline range, ctm     # Chronic respiratory failure.   -Pt s/p percutaneous trach by pulm on 10/14/24  - Continue trach collar   - Continue routine trach care and suctioning PRN  - Chest PT      # Neurogenic dysphagia.   -Pt s/pPEG with surgery 10/21/24  - TF per nutrition  - aspiration precautions and elevation of HOB.    #Acute urinary retention.   - doxazosin 8mg  - Failure TOV on 11/25/24     # Functional quadriplegia in  setting of brainstem hemorrhage  - decub precautions  - care per nursing protocol     # DVT prop  - Heparin SQ q8     # Dispo:  Please step down to Regional Floor

## 2024-12-14 NOTE — PROGRESS NOTE ADULT - SUBJECTIVE AND OBJECTIVE BOX
HPI:  Unknown age male, unknown past medical history (reported stroke and MI by coworkers) presented to [facility] with AMS, Pt was working at [employer] when he was found down by coworkers. EMS called and pt brought to [facility] ED. Intubated, sedated, started on cardene for SBPs in 200s. CT head showed brain stem bleed. Transferred to NSICU for further management.  (30 Sep 2024 12:55)      Subjective:  GEORGE overnight.     Hospital course:  9/30: transferred from [facility]. A line placed. Versed dc'd. Cy Rader at bedside, states pt has family in [place], cannot confirm medications or PMH other than stroke and MI. 250cc bolus 3% given. LR switched to NS. hydralazine 25q8 started, 3% started, switched propofol to precedex   10/1: stability CTH done. Added labetalol, started TF. Palliative consulted. ethics consulted to determine surrogate. febrile 103, pan cx sent  10/2: BD 2, GEORGE overnight. TF resumed. Desatt'd to 80s, FiO2 inc. to 50. Fentanyl given, ABG, CXR ordered. Maxxed on precedex, started on propofol for DARIEN -4 - -5. Precedex dc'd. Duonebs, mucomyst, hypertonic added. 3% dc'd. Cardene dc'd. Start vanc/CTX. Increased labetalol 200q8. MRSA negative, dc'd vanc. ETT pulled back 2cm x 2, good positioning after confirmatory chest xray. Ethics attempting to establish HCP with family. Na 159, starting FW 250q6 for range 150-155.   10/3: BD3, GEORGE o/n, neuro stable. Na elevating, FW increased to 300q6. Dc'd bowel reg for diarrhea. vEEG started. SQH 5000q8 tonight.   10/4: BD 4, albumn bolus, incr. LR to 80 2/2 incr. in Cr, LR to 100cc/hr for uptrending Cr. Started 7% hypersal for 48hrs and SL atropine for inline/oral thick secretions. Dc'd CTX and started ancef for MSSA in the sputum. Nephrology consulted for CKD, f/u recs. SBP 170s, given hydralazine 10mg IVP.   10/5: BD5, o/n 10mg IVP hydralazine given for SBP 170s and started on hydralazine 25q8 via OGT. 10mg IV push labetalol for SBP > 160s. RT placed for diarrhea.   10/6: BD6, o/n FW increased to 350q4 per nephrology recs. IV tylenol for temp 100.6, SBp 160s presumed uncomfortable.   10/7: BD7, overnight pancultured for temp 101.8F.   10/8: BD8. GEORGE. Cr bumped. decreased LR to 75cc/hr. Adding simethicone ATC. incr hydralazine 50mgTID. Incr labetalol 300mgTID. Na 145, decreased FWF to 250q6. Start precedex. FENa consistent with intrinsic kidney injury. Pend repeat renal US. Retaining up to 1.3L, bladder scans q6, straight cath PRN  10/9: BD 9. GEORGE overnight. Neuro stable. abd xray for distention w non-specific gas pattern, OGT to LIWS for morning. duonebs/mucomyst to q8 for improving secretions. Changed tube feeds to Jevity 1.5 20cc/hr, low rate due to abdominal distention, nepro dense and more difficult to digest. Tolerating CPAP, confirmed by ABG.   10/10: BD 10. GEORGE overnight. Neuro stable. (+) gabriel for urinary retention on bladder scan. inc TF to goal rate of 40cc/hr. family leaning toward pursuing trach/PEG. 1/2 amp for FS 81.   10/11: BD 11. GEORGE overnight. Neuro stable. Trach/PEG consults placed.   10/12: BD 12. GEORGE overnight. Neuro stable. MRI brain complete.   10/13: BD 13. Increase flomax. Hold SQH after PM dose for trach tm. IVL.   10/14: BD 14. GEORGE overnight, remains on AC/VC. Gabriel placed for urinary retention. Dc'd free water.  S/p trach with pulm. NGT placed and CXR confirmed in good position.   10/15: BD 15, GEORGE ovn. resumed feeds. spiked 101, pan cx sent.   10/16: BD 16. GEORGE ovn. Lokelma 5mg for K+ 5.4. Started vanc q 24/zosyn for empiric PNA coverage, IVF to 100/hr. PEG held for fever.   10/17: BD 17,  ordered serum osm and urine osm for am. Started sinemet for neurostimulation. Increased cardura to 0.8. Started FW 100q4, dc'd IVF. MRSA negative, dc'd vanc. NGT replaced d/t coiling.   10/18: BD 18, GEORGE overnight, neuro stable. Amantadine added for neurostim. zosyn changed to unasyn for acinetobacter baumannii, failed TOV and required SC  10/19: BD 19, GEORGE ovn. cardura 2mg added for retention. labetalol decreased 200q8, hydralazine decreased 25q8. Gabriel replaced.   10/20: BD20, GEORGE overnight. NGT dislodged, replaced. PEG tomorrow w/ gen surg, FW increased to 150q4 and labetalol decreased to 100q8, lokelma given for hyperkalemia.   10/21: BD 21. POD0 PEG placement with Gen surg. decr labetolol to 50q8, incr. cardura to 0.4, started lokelma and phoslo, dc gabriel POD0 PEG placement with Gen surg.  10/22: BD 22. Plan to start TF today via PEG. dc labetalol, Following ophtho recs. Increased apnea settings - found to be in cheyne-moe respiration. CPAP 5/5.  10/23: BD 23. hydralazine d/c'd, trach collar trial today. Rectal tube placed at 6am.  10/24: BD24, o/n lokelma held due to diarrhea. Free water 100q6 resumed. dc'd tamsulosin, amantadine. Incr'd cardura to 8mg qhs. Dc'd FW. Switched jevity to nepro. gabriel placed for high urine output. Started SL atropine for oral secretions. Dc'd free water.  10/25: BD25, o/n decreased suctioning requirements to > q4hrs, GEORGE. Cr improving, cont phoslo, lokelma held at this time. Gabriel placed yest, cont. Tolerating trach collar. Given 500cc plasmalyte bolus for ANIKA. Dc'd sinemet.   10/26: BD26, o/n resumed lokelma 5mg daily and resumed 100cc free water q6hrs. Change in neuro status with new right pupillary dilation with anisocoria (right pupil 6mm fixed and left pupil 3mm briskly reactive). Given 23.4% NaCl bullet, taken for emergent CTH showing mostly resolved pontine hemorrhage, continued brainstem hypodensity likely edema d/t hemorrhage, no new hemorrhage or infarct, no herniation, mild increase in size of left lateral ventricle. Vitals remaine stable. Na goal > 140.   10/27: BD27, o/n GEORGE.Neuro stable. Pend stepdown with airway bed.   10/28: BD 28. GEORGE overnight. Neuro stable. Miralax ordered. Gabriel removed, pending TOV.  10/29: BD 29. GEORGE o/n. Given 2L NS over 8 hrs for increased BUN/Cr ratio. Gabriel placed for frequent straight cath.   10/30: BD 30.   10/31: BD 31. GEORGE overnight. Na 149, increased free water to 200q6. 1L NS for uptrending BUN.   11/1: BD 32. GEORGE overnight. Given 1L NS for dehydration. Na 146, increased FW to 250q6.   11/2: BD 33 GEORGE overnight, neuro stable, given 500cc bolus for net negative status and tachycardia   11/3: BD 34, GEORGE overnight, neuro stable. Patient remains tachycardic, EKG showing sinus tachycardia, given additional 500cc NS bolus. Febrile to 101.9F, pan cultured (without UA), CXR WNL, given tylenol.   11/4: BD 35, GEORGE overnight, neuro stable. Given 1L NS for tachycardia. sputum (+) for stenotropohomas maltophilia.   11/5: BD 36 GEORGE overnight, neuro stable. Vancomycin dc'd. Chest PT BID. ID consulted, cont zosyn.  11/6: BD 37. blood cx + klebsiella dc zosyn changed to cefepime, CTAP ordered, rpt blood cx sent.    11/7: BD 38. Pending CT A/P, given 250cc bolus and starting maintenance fluids overnight. Pending CT A/P after bolus   11/8: BD 39. CT CAP negative for infection.   11/9: BD 40. GEORGE overnight.  11/10: BD 41. GEORGE overnight. desat to 85 on trach collar, O2 inc to 10L and 100%, O2 sat inc to 95. pt tachy to 110s, euvolemic. given tylenol. ABG and CXR ordered. spiked fever, pancultured, RVP negative. AM ABG w pO2 79, rpt w pO2 79. pt appears comfortable, satting 94%.   11/11: BD 42. GEORGE overnight. pt became tachy to 130s, desat to 90 on 100% FiO2 and 10L. suctioned, (+) productive cough. temp 101.4, given 1g IV tylenol and 500cc NS bolus for euvolemia. fever and HR downtrending. LE dopplers negative for dvt  11/12: BD 43, GEORGE ovn, fever and HR downtrending, satting 97% 70% FIO2  11/13: BD 44, GEORGE ovn. started standing tylenol x24 hours for tachycardia. desat to 80s, o2 increased. CXR stable, pending CTA PE protocol.   11/14: BD 45, GEORGE overnight, neuro stable. resp therapy dec FiO2 to 70%.   11/15: BD 46, GEORGE overnight, neuro stable.  Rapid called for desaturation 30s, tachycardic 140s. Patient bagged, 100% fio2, heavily suctioned. CXR/POCUS unremarkable. ABG c/w desaturation. WBC 14.71. Afebrile. O2 improved to 90s and patient upgraded to ICU. ABG paO2 30s improved to 89 on vent. IV Tylenol x 1, sputum sent. Start protonix while o-n vent.   11/16: BD 47. POCUS showed collapsable IVCF, given 1L bolus. Vanco/Cefpime added empriic for PNA, NGT feeds restarted. MRSA swab neg, Vanc DC'd.   11/17: BD 48. GEORGE overnight. 1l bolus for tachycardia. Spiked to 101, cultured. 500cc bolus for tachycardia, tachy to 148 given 25mcg fentanyl, 250cc albumin, 1.5L bolus. 5 IV lopressor with response HR to 100s. +Stenotrophomonas on sputum cx.   11/18: BD 49. GEORGE overnight. Consulted ID, cefepime switched to bactrim x7days. Started hydrochlorothiazine 12.5mg daily.  11/19: BD 50. Tachy 120s, given tylenol and 500cc NS. Tolerating 5/5, switched to TCx. Holding phos binder. D/c Bactrim. D/c gabriel, f/u TOV. Dc'd PPI.   11/20: BD 51. GEORGE ovn. 1600 satting low 90s, mildly tachy to 110s, afebrile, RR wnl. O2 improved to mid 90s while inc O2 to 100% on TCx. CPAP 5/5 placed back on.  11/21: BD 52, GEORGE ovn, tolerating CPAP 5/5. Switch to trach collar during the day if tolerating well. HCTZ held for Cr bump, straight cath frequence increased to q4  11/22: BD 53, GEORGE ovn. Resumed phoslo. Gabriel placed. Resumed HCTZ.   11/23: BD 54. Holding tylenol in setting of possible fever, will require pan cx if febrile. Cr improved today. Cont CPAP. Bowel regimen held i/s/o diarrhea. FOBT negative.  11/24: BD 55. GEORGE overnight. Neuro stable. HCTZ dc'ed, started lisinopril 5. Lokelma dc'ed for K 3.7.   11/25: BD 56, GEORGE overnight. Neuro stable. dc'd lisinopril 5mg. Gabriel dc'd. TOV. 1545 noted to be hypotensive, MAP 50, in supine position on chair, HR 60s, afebrile, O2 96%. Given 1L cc NS bolus, placed back on bed in reverse trendelenberg, improved to map of 66. Neostick at bedside. Vitals check q1h. Dc'ed amlodipine. Failed TOV, bladder scan q6, sc prn. Added back Senna.   11/26: BD 57, GEORGE overnight. Neuro stable. Dc'd phoslo.   11/27: BD 58, GEORGE overnight. Neuro stable.    11/28: BD 59. Gabriel replaced.   11/29: GEORGE.  11/30: GEORGE, neuro stable.   12/1: BD 62, GEORGE overnight  12/2: BD 63, GEORGE overnight.; Given 1L bolus of LR for uptrending BUN/Cr.  12/3: BD 64. Reinstated eye gtt/moisture chamber given increased Rt eye injection  12/4: BD 65. GEORGE overnight. Attempted to speak with ophtho regarding eyelid weight/closure but no answer, full mailbox.   12/5: BD 66, GEORGE overnight, bowel regimen increased and had BM.   12/6: BD 67, GEORGE overnight, neuro stable.  12/7: GEORGE overnight, neuro stable. /110s, given x1 hydralazine 10 mg IVP. Restarted home amlodipine 5mg.  12/8: GEORGE. OOB to chair.     12/11: GEORGE, mucomyst added for thick secretions, simethicone for abd distension, abd xray with stool burden, increased bowel regimen.   12/12: GEORGE overnight.   12/13: GEORGE overnight.   12/14: GEORGE overnight  Vital Signs Last 24 Hrs  T(C): 36.9 (13 Dec 2024 22:15), Max: 37.4 (13 Dec 2024 14:40)  T(F): 98.4 (13 Dec 2024 22:15), Max: 99.3 (13 Dec 2024 14:40)  HR: 78 (13 Dec 2024 23:22) (64 - 95)  BP: 132/92 (13 Dec 2024 23:22) (116/83 - 139/99)  BP(mean): 108 (13 Dec 2024 23:22) (94 - 115)  RR: 16 (13 Dec 2024 23:22) (16 - 18)  SpO2: 99% (13 Dec 2024 23:22) (94% - 100%)    Parameters below as of 13 Dec 2024 23:22  Patient On (Oxygen Delivery Method): tracheostomy collar  O2 Flow (L/min): 10  O2 Concentration (%): 40    I&O's Summary    12 Dec 2024 07:01  -  13 Dec 2024 07:00  --------------------------------------------------------  IN: 2190 mL / OUT: 1600 mL / NET: 590 mL    13 Dec 2024 07:01  -  14 Dec 2024 03:25  --------------------------------------------------------  IN: 1540 mL / OUT: 1575 mL / NET: -35 mL        PHYSICAL EXAM:  General: patient seen laying supine in bed in NAD  Neuro: OEs spontaneously, A&Ox0, R hand moves fingers to command, RLE with some spontaneous toe movement, LLE  withdrawal to noxious, LUE 0/5  HEENT: PERRL, only tracks vertically, +trach collar  Neck: supple  Cardiac: RRR, S1S2  Pulmonary: chest rise symmetric  Abdomen: soft, nontender, nondistended, +PEG  Ext: perfusing well  Skin: warm, dry    LABS:                  CAPILLARY BLOOD GLUCOSE          Drug Levels: [] N/A    CSF Analysis: [] N/A      Allergies    Allergy Status Unknown    Intolerances      MEDICATIONS:  Antibiotics:    Neuro:  acetaminophen   Oral Liquid .. 650 milliGRAM(s) Oral every 6 hours PRN    Anticoagulation:  heparin   Injectable 5000 Unit(s) SubCutaneous every 8 hours    OTHER:  acetylcysteine 10%  Inhalation 4 milliLiter(s) Inhalation every 6 hours  albuterol/ipratropium for Nebulization 3 milliLiter(s) Nebulizer every 6 hours  amLODIPine   Tablet 5 milliGRAM(s) Oral daily  artificial tears (preservative free) Ophthalmic Solution 1 Drop(s) Right EYE every 4 hours  chlorhexidine 2% Cloths 1 Application(s) Topical <User Schedule>  doxazosin 8 milliGRAM(s) Oral at bedtime  erythromycin   Ointment 1 Application(s) Right EYE at bedtime  ofloxacin 0.3% Solution 1 Drop(s) Right EYE four times a day  petrolatum Ophthalmic Ointment 1 Application(s) Both EYES two times a day  polyethylene glycol 3350 17 Gram(s) Oral every 12 hours  senna 2 Tablet(s) Oral every 12 hours  simethicone 80 milliGRAM(s) Chew three times a day    IVF:    CULTURES:    RADIOLOGY & ADDITIONAL TESTS:    AMS    Handoff    MEWS Score    Acute myocardial infarction    CVA (cerebral vascular accident)    Intracerebral hemorrhage of brain stem    Brainstem stroke    Brain stem stroke syndrome    Brain stem hemorrhage    Brain stem stroke syndrome    Hemorrhagic stroke    Brainstem stroke    Encephalopathy acute    Functional quadriplegia    Advanced care planning/counseling discussion    Encounter for palliative care    Pontine hemorrhage    Neurogenic dysphagia    Chronic respiratory failure    Acute kidney injury superimposed on CKD    Acute urinary retention    Hypertensive emergency    Sepsis, unspecified organism    Sepsis    Gram-negative bacteremia    Percutaneous tracheal puncture    Altered mental status examination    EGD, with PEG    AMS    [email or web address]        ASSESSMENT:  43 y/o PMH ?stroke/MI present to [facility] after collapsing at work. Decorticate posturing, vomiting, intubated for airway protection. 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 Transferred to [facility] for further management. s/p trach 10/14. s/p peg 10/21. Re-upgrade to ICU 2/2 desaturation event and suctioning requirements 11/15.     PLAN:  Neuro:  - neuro/vitals q4h, protected sleep time 10PM-5AM  - pain control: tylenol prn  - vEEG (10/3-4)- negative, (10/17-10/19) - negative  - CTH 9/30: enlarged pontine hemorrhage, CTH 10/3: stable, CTH 10/25: mostly resolved pontine hemorrhage  - MRI brain 10/12: parenchymal hemorrhage, acute/subacute R cerebellar stroke    - stroke core measures, stroke neuro signed off    CV:  - -160  - HTN: amlodipine 5mg, hold lisinopril 5mg   - echo (9/30) EF 75%    Resp:  - trach collar   - Secretions: duonebs/mucomyst/chest PT q8h   - CTA chest 11/13 negative for PE, inc ground glass opacities    GI:  - TF via PEG (placed 10/21 by gen surg)  - bowel regimen, last BM 12/13    Renal:  - IVL; FW flushes 250cc q6hrs   - Urinary retenion: Gabriel replaced 11/28  - CKD: trend BUN/Cr  - renal US 10/1: echogenicity c/w chronic med renal dz, repeat 10/8: inc renal echogeicity, c/f medical renal disease w increased hydro     Endo:  - A1c 5.4    Heme:  - DVT ppx: SCDs, SQH 5000u q8h   - LE dopplers negative 11/11    ID:  - febrile, last pancx 11/16, MRSA swab neg 11/16   -  s/p Stenotrophomonas maltophilia PNA: s/p Bactrim (11/18-11/19) s/p Cefepime (11/16-11/18)  - 11/3, (+) sputum for stenotrophomas maltophlia, blood cx (+) klebsiella, cefepime 2gq12 (11/6 - 11/12)   - empiric tx: s/p zosyn (11/3-11/6) , s/p vanc  (11/3-11/5)  - S/p Ancef (10/4-10/14) for PNA, and s/p Unasyn (10/18-10/23) +actinobacter baumanii     MISC:  - ophtho consult for keratitis  - erythromycin ointment to rt eye q4hrs, ofloxacin ointment to rt eye QID, artificial tears to rt eye q2hrs, moisture chamber at bedtime    Dispo: tele status, full code, pending placement and guardianship hearing     D/w Dr. D'Amico     Assessment:  Present when checked    []  GCS  E   V  M     Heart Failure: []Acute, [] acute on chronic , []chronic  Heart Failure:  [] Diastolic (HFpEF), [] Systolic (HFrEF), []Combined (HFpEF and HFrEF), [] RHF, [] Pulm HTN, [] Other    [] ANIKA, [] ATN, [] AIN, [] other  [] CKD1, [] CKD2, [] CKD 3, [] CKD 4, [] CKD 5, []ESRD    Encephalopathy: [] Metabolic, [] Hepatic, [] toxic, [] Neurological, [] Other    Abnormal Nurtitional Status: [] malnurtition (see nutrition note), [ ]underweight: BMI < 19, [] morbid obesity: BMI >40, [] Cachexia    [] Sepsis  [] hypovolemic shock,[] cardiogenic shock, [] hemorrhagic shock, [] neuogenic shock  [] Acute Respiratory Failure  []Cerebral edema, [] Brain compression/ herniation,   [] Functional quadriplegia  [] Acute blood loss anemia

## 2024-12-14 NOTE — PROGRESS NOTE ADULT - SUBJECTIVE AND OBJECTIVE BOX
Patient is a 44y old  Male who presents with a chief complaint of brain stem bleed (14 Dec 2024 03:25)      SUBJECTIVE / OVERNIGHT EVENTS:  No acute events overnight.    MEDICATIONS  (STANDING):  acetylcysteine 10%  Inhalation 4 milliLiter(s) Inhalation every 6 hours  albuterol/ipratropium for Nebulization 3 milliLiter(s) Nebulizer every 6 hours  amLODIPine   Tablet 5 milliGRAM(s) Oral daily  artificial tears (preservative free) Ophthalmic Solution 1 Drop(s) Right EYE every 4 hours  chlorhexidine 2% Cloths 1 Application(s) Topical <User Schedule>  doxazosin 8 milliGRAM(s) Oral at bedtime  erythromycin   Ointment 1 Application(s) Right EYE at bedtime  heparin   Injectable 5000 Unit(s) SubCutaneous every 8 hours  ofloxacin 0.3% Solution 1 Drop(s) Right EYE four times a day  petrolatum Ophthalmic Ointment 1 Application(s) Both EYES two times a day  polyethylene glycol 3350 17 Gram(s) Oral every 12 hours  senna 2 Tablet(s) Oral every 12 hours  simethicone 80 milliGRAM(s) Chew three times a day    MEDICATIONS  (PRN):  acetaminophen   Oral Liquid .. 650 milliGRAM(s) Oral every 6 hours PRN Temp greater or equal to 38.5C (101.3F), Mild Pain (1 - 3)      CAPILLARY BLOOD GLUCOSE        I&O's Summary    13 Dec 2024 07:01  -  14 Dec 2024 07:00  --------------------------------------------------------  IN: 2030 mL / OUT: 1825 mL / NET: 205 mL    14 Dec 2024 07:01  -  14 Dec 2024 13:27  --------------------------------------------------------  IN: 240 mL / OUT: 290 mL / NET: -50 mL        PHYSICAL EXAM:  Vital Signs Last 24 Hrs  T(C): 36.9 (14 Dec 2024 08:56), Max: 37.8 (14 Dec 2024 05:06)  T(F): 98.5 (14 Dec 2024 08:56), Max: 100.1 (14 Dec 2024 05:06)  HR: 86 (14 Dec 2024 12:13) (66 - 92)  BP: 125/89 (14 Dec 2024 12:13) (109/78 - 132/97)  BP(mean): 101 (14 Dec 2024 12:13) (89 - 111)  RR: 16 (14 Dec 2024 12:13) (16 - 18)  SpO2: 92% (14 Dec 2024 12:13) (92% - 100%)    Parameters below as of 14 Dec 2024 12:13  Patient On (Oxygen Delivery Method): tracheostomy collar  O2 Flow (L/min): 10  O2 Concentration (%): 40  General: awake, looking comfortable, no WOB on trach collar, some tracking present  HEENT: tracheostomy clean  Lungs: poor inspiration, no crackles, no wheezes  Heart: regular  Abdomen: soft, no visible tenderness, + BS  Extremities: warm, no edema, not moving to commands  LABS:                        12.0   6.88  )-----------( 201      ( 14 Dec 2024 05:30 )             36.8     12-14    139  |  102  |  43[H]  ----------------------------<  131[H]  3.4[L]   |  24  |  1.17    Ca    9.4      14 Dec 2024 05:30  Phos  3.9     12-14  Mg     2.2     12-14            Urinalysis Basic - ( 14 Dec 2024 05:30 )    Color: x / Appearance: x / SG: x / pH: x  Gluc: 131 mg/dL / Ketone: x  / Bili: x / Urobili: x   Blood: x / Protein: x / Nitrite: x   Leuk Esterase: x / RBC: x / WBC x   Sq Epi: x / Non Sq Epi: x / Bacteria: x        RADIOLOGY & ADDITIONAL TESTS:    Imaging Personally Reviewed:    Consultant(s) Notes Reviewed:      Care Discussed with Consultants/Other Providers:

## 2024-12-15 LAB
ADD ON TEST-SPECIMEN IN LAB: SIGNIFICANT CHANGE UP
ADD ON TEST-SPECIMEN IN LAB: SIGNIFICANT CHANGE UP
ANION GAP SERPL CALC-SCNC: 11 MMOL/L — SIGNIFICANT CHANGE UP (ref 5–17)
ANION GAP SERPL CALC-SCNC: 13 MMOL/L — SIGNIFICANT CHANGE UP (ref 5–17)
APTT BLD: 34.1 SEC — SIGNIFICANT CHANGE UP (ref 24.5–35.6)
BASE EXCESS BLDA CALC-SCNC: 1.5 MMOL/L — SIGNIFICANT CHANGE UP (ref -2–3)
BASE EXCESS BLDA CALC-SCNC: 1.5 MMOL/L — SIGNIFICANT CHANGE UP (ref -2–3)
BASE EXCESS BLDA CALC-SCNC: 3.7 MMOL/L — HIGH (ref -2–3)
BLD GP AB SCN SERPL QL: NEGATIVE — SIGNIFICANT CHANGE UP
BUN SERPL-MCNC: 41 MG/DL — HIGH (ref 7–23)
BUN SERPL-MCNC: 42 MG/DL — HIGH (ref 7–23)
CALCIUM SERPL-MCNC: 8.5 MG/DL — SIGNIFICANT CHANGE UP (ref 8.4–10.5)
CALCIUM SERPL-MCNC: 8.7 MG/DL — SIGNIFICANT CHANGE UP (ref 8.4–10.5)
CHLORIDE SERPL-SCNC: 102 MMOL/L — SIGNIFICANT CHANGE UP (ref 96–108)
CHLORIDE SERPL-SCNC: 103 MMOL/L — SIGNIFICANT CHANGE UP (ref 96–108)
CO2 BLDA-SCNC: 27 MMOL/L — HIGH (ref 19–24)
CO2 BLDA-SCNC: 28 MMOL/L — HIGH (ref 19–24)
CO2 BLDA-SCNC: 29 MMOL/L — HIGH (ref 19–24)
CO2 SERPL-SCNC: 24 MMOL/L — SIGNIFICANT CHANGE UP (ref 22–31)
CO2 SERPL-SCNC: 26 MMOL/L — SIGNIFICANT CHANGE UP (ref 22–31)
CREAT SERPL-MCNC: 1.19 MG/DL — SIGNIFICANT CHANGE UP (ref 0.5–1.3)
CREAT SERPL-MCNC: 1.2 MG/DL — SIGNIFICANT CHANGE UP (ref 0.5–1.3)
EGFR: 76 ML/MIN/1.73M2 — SIGNIFICANT CHANGE UP
GAS PNL BLDA: SIGNIFICANT CHANGE UP
GLUCOSE SERPL-MCNC: 119 MG/DL — HIGH (ref 70–99)
GLUCOSE SERPL-MCNC: 135 MG/DL — HIGH (ref 70–99)
GRAM STN FLD: ABNORMAL
HCO3 BLDA-SCNC: 26 MMOL/L — SIGNIFICANT CHANGE UP (ref 21–28)
HCO3 BLDA-SCNC: 27 MMOL/L — SIGNIFICANT CHANGE UP (ref 21–28)
HCO3 BLDA-SCNC: 28 MMOL/L — SIGNIFICANT CHANGE UP (ref 21–28)
HCT VFR BLD CALC: 35.2 % — LOW (ref 39–50)
HCT VFR BLD CALC: 35.8 % — LOW (ref 39–50)
HGB BLD-MCNC: 11.3 G/DL — LOW (ref 13–17)
HGB BLD-MCNC: 11.3 G/DL — LOW (ref 13–17)
INR BLD: 0.97 — SIGNIFICANT CHANGE UP (ref 0.85–1.16)
LACTATE SERPL-SCNC: 1.2 MMOL/L — SIGNIFICANT CHANGE UP (ref 0.5–2)
MAGNESIUM SERPL-MCNC: 2 MG/DL — SIGNIFICANT CHANGE UP (ref 1.6–2.6)
MAGNESIUM SERPL-MCNC: 2 MG/DL — SIGNIFICANT CHANGE UP (ref 1.6–2.6)
MCHC RBC-ENTMCNC: 29 PG — SIGNIFICANT CHANGE UP (ref 27–34)
MCHC RBC-ENTMCNC: 29.4 PG — SIGNIFICANT CHANGE UP (ref 27–34)
MCHC RBC-ENTMCNC: 31.6 G/DL — LOW (ref 32–36)
MCHC RBC-ENTMCNC: 32.1 G/DL — SIGNIFICANT CHANGE UP (ref 32–36)
MCV RBC AUTO: 91.4 FL — SIGNIFICANT CHANGE UP (ref 80–100)
MCV RBC AUTO: 92 FL — SIGNIFICANT CHANGE UP (ref 80–100)
NRBC # BLD: 0 /100 WBCS — SIGNIFICANT CHANGE UP (ref 0–0)
NRBC # BLD: 0 /100 WBCS — SIGNIFICANT CHANGE UP (ref 0–0)
NRBC BLD-RTO: 0 /100 WBCS — SIGNIFICANT CHANGE UP (ref 0–0)
NRBC BLD-RTO: 0 /100 WBCS — SIGNIFICANT CHANGE UP (ref 0–0)
PCO2 BLDA: 39 MMHG — SIGNIFICANT CHANGE UP (ref 35–48)
PCO2 BLDA: 39 MMHG — SIGNIFICANT CHANGE UP (ref 35–48)
PCO2 BLDA: 43 MMHG — SIGNIFICANT CHANGE UP (ref 35–48)
PH BLDA: 7.4 — SIGNIFICANT CHANGE UP (ref 7.35–7.45)
PH BLDA: 7.43 — SIGNIFICANT CHANGE UP (ref 7.35–7.45)
PH BLDA: 7.46 — HIGH (ref 7.35–7.45)
PHOSPHATE SERPL-MCNC: 3.1 MG/DL — SIGNIFICANT CHANGE UP (ref 2.5–4.5)
PHOSPHATE SERPL-MCNC: 4.4 MG/DL — SIGNIFICANT CHANGE UP (ref 2.5–4.5)
PLATELET # BLD AUTO: 183 K/UL — SIGNIFICANT CHANGE UP (ref 150–400)
PLATELET # BLD AUTO: 184 K/UL — SIGNIFICANT CHANGE UP (ref 150–400)
PO2 BLDA: 137 MMHG — HIGH (ref 83–108)
PO2 BLDA: 243 MMHG — HIGH (ref 83–108)
PO2 BLDA: 49 MMHG — CRITICAL LOW (ref 83–108)
POTASSIUM SERPL-MCNC: 3.7 MMOL/L — SIGNIFICANT CHANGE UP (ref 3.5–5.3)
POTASSIUM SERPL-MCNC: 3.9 MMOL/L — SIGNIFICANT CHANGE UP (ref 3.5–5.3)
POTASSIUM SERPL-SCNC: 3.7 MMOL/L — SIGNIFICANT CHANGE UP (ref 3.5–5.3)
POTASSIUM SERPL-SCNC: 3.9 MMOL/L — SIGNIFICANT CHANGE UP (ref 3.5–5.3)
PROTHROM AB SERPL-ACNC: 11.4 SEC — SIGNIFICANT CHANGE UP (ref 9.9–13.4)
RBC # BLD: 3.85 M/UL — LOW (ref 4.2–5.8)
RBC # BLD: 3.89 M/UL — LOW (ref 4.2–5.8)
RBC # FLD: 14.4 % — SIGNIFICANT CHANGE UP (ref 10.3–14.5)
RBC # FLD: 14.5 % — SIGNIFICANT CHANGE UP (ref 10.3–14.5)
RH IG SCN BLD-IMP: POSITIVE — SIGNIFICANT CHANGE UP
SAO2 % BLDA: 100 % — HIGH (ref 94–98)
SAO2 % BLDA: 88.4 % — LOW (ref 94–98)
SAO2 % BLDA: 99.5 % — HIGH (ref 94–98)
SODIUM SERPL-SCNC: 139 MMOL/L — SIGNIFICANT CHANGE UP (ref 135–145)
SODIUM SERPL-SCNC: 140 MMOL/L — SIGNIFICANT CHANGE UP (ref 135–145)
SPECIMEN SOURCE: SIGNIFICANT CHANGE UP
WBC # BLD: 10.83 K/UL — HIGH (ref 3.8–10.5)
WBC # BLD: 13.98 K/UL — HIGH (ref 3.8–10.5)
WBC # FLD AUTO: 10.83 K/UL — HIGH (ref 3.8–10.5)
WBC # FLD AUTO: 13.98 K/UL — HIGH (ref 3.8–10.5)

## 2024-12-15 PROCEDURE — 74018 RADEX ABDOMEN 1 VIEW: CPT | Mod: 26

## 2024-12-15 PROCEDURE — 71275 CT ANGIOGRAPHY CHEST: CPT | Mod: 26

## 2024-12-15 PROCEDURE — 99291 CRITICAL CARE FIRST HOUR: CPT

## 2024-12-15 PROCEDURE — 99232 SBSQ HOSP IP/OBS MODERATE 35: CPT | Mod: 25

## 2024-12-15 PROCEDURE — 71045 X-RAY EXAM CHEST 1 VIEW: CPT | Mod: 26

## 2024-12-15 PROCEDURE — 70450 CT HEAD/BRAIN W/O DYE: CPT | Mod: 26

## 2024-12-15 PROCEDURE — 36620 INSERTION CATHETER ARTERY: CPT

## 2024-12-15 RX ORDER — PIPERACILLIN-TAZO-DEXTROSE,ISO 3.375G/5
3.38 IV SOLUTION, PIGGYBACK PREMIX FROZEN(ML) INTRAVENOUS ONCE
Refills: 0 | Status: COMPLETED | OUTPATIENT
Start: 2024-12-15 | End: 2024-12-15

## 2024-12-15 RX ORDER — SODIUM CHLORIDE 9 G/1000ML
1000 INJECTION, SOLUTION INTRAVENOUS
Refills: 0 | Status: DISCONTINUED | OUTPATIENT
Start: 2024-12-15 | End: 2024-12-15

## 2024-12-15 RX ORDER — VANCOMYCIN HCL IN 5 % DEXTROSE 1.5G/250ML
1250 PLASTIC BAG, INJECTION (ML) INTRAVENOUS EVERY 12 HOURS
Refills: 0 | Status: DISCONTINUED | OUTPATIENT
Start: 2024-12-15 | End: 2024-12-16

## 2024-12-15 RX ORDER — PIPERACILLIN-TAZO-DEXTROSE,ISO 3.375G/5
3.38 IV SOLUTION, PIGGYBACK PREMIX FROZEN(ML) INTRAVENOUS EVERY 8 HOURS
Refills: 0 | Status: DISCONTINUED | OUTPATIENT
Start: 2024-12-15 | End: 2024-12-17

## 2024-12-15 RX ORDER — DEXTROSE 50 % IN WATER 50 %
25 SYRINGE (ML) INTRAVENOUS ONCE
Refills: 0 | Status: DISCONTINUED | OUTPATIENT
Start: 2024-12-15 | End: 2024-12-15

## 2024-12-15 RX ORDER — GLUCAGON 3 MG/1
1 POWDER NASAL ONCE
Refills: 0 | Status: DISCONTINUED | OUTPATIENT
Start: 2024-12-15 | End: 2024-12-15

## 2024-12-15 RX ORDER — INSULIN LISPRO 100 U/ML
INJECTION, SOLUTION INTRAVENOUS; SUBCUTANEOUS
Refills: 0 | Status: DISCONTINUED | OUTPATIENT
Start: 2024-12-15 | End: 2024-12-16

## 2024-12-15 RX ORDER — DEXTROSE 50 % IN WATER 50 %
15 SYRINGE (ML) INTRAVENOUS ONCE
Refills: 0 | Status: DISCONTINUED | OUTPATIENT
Start: 2024-12-15 | End: 2024-12-15

## 2024-12-15 RX ORDER — DEXTROSE 50 % IN WATER 50 %
12.5 SYRINGE (ML) INTRAVENOUS ONCE
Refills: 0 | Status: DISCONTINUED | OUTPATIENT
Start: 2024-12-15 | End: 2024-12-15

## 2024-12-15 RX ADMIN — Medication 1 DROP(S): at 07:16

## 2024-12-15 RX ADMIN — Medication 1 DROP(S): at 15:27

## 2024-12-15 RX ADMIN — ACETYLCYSTEINE 4 MILLILITER(S): 200 INHALANT RESPIRATORY (INHALATION) at 11:23

## 2024-12-15 RX ADMIN — IPRATROPIUM BROMIDE AND ALBUTEROL SULFATE 3 MILLILITER(S): .5; 2.5 SOLUTION RESPIRATORY (INHALATION) at 05:45

## 2024-12-15 RX ADMIN — ERYTHROMYCIN 1 APPLICATION(S): 5 OINTMENT OPHTHALMIC at 21:46

## 2024-12-15 RX ADMIN — Medication 15 MILLILITER(S): at 05:39

## 2024-12-15 RX ADMIN — DOXAZOSIN MESYLATE 8 MILLIGRAM(S): 8 TABLET ORAL at 22:05

## 2024-12-15 RX ADMIN — Medication 166.67 MILLIGRAM(S): at 05:34

## 2024-12-15 RX ADMIN — Medication 200 GRAM(S): at 02:00

## 2024-12-15 RX ADMIN — Medication 2 TABLET(S): at 05:39

## 2024-12-15 RX ADMIN — Medication 15 MILLILITER(S): at 17:04

## 2024-12-15 RX ADMIN — Medication 80 MILLIGRAM(S): at 13:31

## 2024-12-15 RX ADMIN — Medication 1 DROP(S): at 04:03

## 2024-12-15 RX ADMIN — Medication 40 MILLIGRAM(S): at 11:22

## 2024-12-15 RX ADMIN — Medication 1 APPLICATION(S): at 05:34

## 2024-12-15 RX ADMIN — Medication 1 DROP(S): at 11:21

## 2024-12-15 RX ADMIN — OFLOXACIN 1 DROP(S): 3 SOLUTION OPHTHALMIC at 05:36

## 2024-12-15 RX ADMIN — IPRATROPIUM BROMIDE AND ALBUTEROL SULFATE 3 MILLILITER(S): .5; 2.5 SOLUTION RESPIRATORY (INHALATION) at 17:22

## 2024-12-15 RX ADMIN — ACETYLCYSTEINE 4 MILLILITER(S): 200 INHALANT RESPIRATORY (INHALATION) at 06:38

## 2024-12-15 RX ADMIN — Medication 75 MILLILITER(S): at 17:05

## 2024-12-15 RX ADMIN — OFLOXACIN 1 DROP(S): 3 SOLUTION OPHTHALMIC at 11:21

## 2024-12-15 RX ADMIN — Medication 1 APPLICATION(S): at 17:09

## 2024-12-15 RX ADMIN — Medication 20 MILLIEQUIVALENT(S): at 07:06

## 2024-12-15 RX ADMIN — OFLOXACIN 1 DROP(S): 3 SOLUTION OPHTHALMIC at 17:04

## 2024-12-15 RX ADMIN — Medication 25 GRAM(S): at 09:32

## 2024-12-15 RX ADMIN — HEPARIN SODIUM 5000 UNIT(S): 1000 INJECTION INTRAVENOUS; SUBCUTANEOUS at 05:39

## 2024-12-15 RX ADMIN — POLYETHYLENE GLYCOL 3350 17 GRAM(S): 17 POWDER, FOR SOLUTION ORAL at 17:04

## 2024-12-15 RX ADMIN — Medication 1 APPLICATION(S): at 05:36

## 2024-12-15 RX ADMIN — IPRATROPIUM BROMIDE AND ALBUTEROL SULFATE 3 MILLILITER(S): .5; 2.5 SOLUTION RESPIRATORY (INHALATION) at 11:23

## 2024-12-15 RX ADMIN — ACETYLCYSTEINE 4 MILLILITER(S): 200 INHALANT RESPIRATORY (INHALATION) at 17:23

## 2024-12-15 RX ADMIN — ACETYLCYSTEINE 4 MILLILITER(S): 200 INHALANT RESPIRATORY (INHALATION) at 23:32

## 2024-12-15 RX ADMIN — HEPARIN SODIUM 5000 UNIT(S): 1000 INJECTION INTRAVENOUS; SUBCUTANEOUS at 21:46

## 2024-12-15 RX ADMIN — Medication 166.67 MILLIGRAM(S): at 17:04

## 2024-12-15 RX ADMIN — Medication 2 TABLET(S): at 17:04

## 2024-12-15 RX ADMIN — Medication 650 MILLIGRAM(S): at 01:55

## 2024-12-15 RX ADMIN — IPRATROPIUM BROMIDE AND ALBUTEROL SULFATE 3 MILLILITER(S): .5; 2.5 SOLUTION RESPIRATORY (INHALATION) at 23:32

## 2024-12-15 RX ADMIN — Medication 650 MILLIGRAM(S): at 12:07

## 2024-12-15 RX ADMIN — POLYETHYLENE GLYCOL 3350 17 GRAM(S): 17 POWDER, FOR SOLUTION ORAL at 05:39

## 2024-12-15 RX ADMIN — Medication 500 MILLILITER(S): at 01:55

## 2024-12-15 RX ADMIN — Medication 80 MILLIGRAM(S): at 21:46

## 2024-12-15 RX ADMIN — HEPARIN SODIUM 5000 UNIT(S): 1000 INJECTION INTRAVENOUS; SUBCUTANEOUS at 13:31

## 2024-12-15 RX ADMIN — Medication 80 MILLIGRAM(S): at 05:39

## 2024-12-15 RX ADMIN — Medication 650 MILLIGRAM(S): at 13:00

## 2024-12-15 RX ADMIN — Medication 25 GRAM(S): at 17:04

## 2024-12-15 RX ADMIN — Medication 1 DROP(S): at 19:01

## 2024-12-15 NOTE — PROGRESS NOTE ADULT - SUBJECTIVE AND OBJECTIVE BOX
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 s/p peg 10/21. Prolonged hospital stay inability to transfer to Rehab due to lack of insurance.   On 12/15 AM while in Stepdown unit patient became tachycardic HR 120s and 10 minutes later O2 sat dropped as low as 89%. Patient suctioned without improvement in O2 sat and tube feeds found in suction catheter. TFs held.  STAT CXR ordered. STAT labs sent. Respiratory therapist called to bedside and patient trach connected to ventilator. After connection to ventilator and further suctioning O2 sat improved to 97% but patient HR remains 120-130s. Upgraded to NSICU for further management.     Detailed Hospital Course/Interval Events   9/30: transferred from [facility]. A line placed. Versed dc'd. Cy Rader at bedside, states pt has family in [place], cannot confirm medications or PMH other than stroke and MI. 250cc bolus 3% given. LR switched to NS. hydralazine 25q8 started, 3% started, switched propofol to precedex   10/1: stability CTH done. Added labetalol, started TF. Palliative consulted. ethics consulted to determine surrogate. febrile 103, pan cx sent  10/2: BD 2, GEORGE overnight. TF resumed. Desatt'd to 80s, FiO2 inc. to 50. Fentanyl given, ABG, CXR ordered. Maxxed on precedex, started on propofol for DARIEN -4 - -5. Precedex dc'd. Duonebs, mucomyst, hypertonic added. 3% dc'd. Cardene dc'd. Start vanc/CTX. Increased labetalol 200q8. MRSA negative, dc'd vanc. ETT pulled back 2cm x 2, good positioning after confirmatory chest xray. Ethics attempting to establish HCP with family. Na 159, starting FW 250q6 for range 150-155.   10/3: BD3, GEORGE o/n, neuro stable. Na elevating, FW increased to 300q6. Dc'd bowel reg for diarrhea. vEEG started. SQH 5000q8 tonight.   10/4: BD 4, albumn bolus, incr. LR to 80 2/2 incr. in Cr, LR to 100cc/hr for uptrending Cr. Started 7% hypersal for 48hrs and SL atropine for inline/oral thick secretions. Dc'd CTX and started ancef for MSSA in the sputum. Nephrology consulted for CKD, f/u recs. SBP 170s, given hydralazine 10mg IVP.   10/5: BD5, o/n 10mg IVP hydralazine given for SBP 170s and started on hydralazine 25q8 via OGT. 10mg IV push labetalol for SBP > 160s. RT placed for diarrhea.   10/6: BD6, o/n FW increased to 350q4 per nephrology recs. IV tylenol for temp 100.6, SBp 160s presumed uncomfortable.   10/7: BD7, overnight pancultured for temp 101.8F.   10/8: BD8. GEORGE. Cr bumped. decreased LR to 75cc/hr. Adding simethicone ATC. incr hydralazine 50mgTID. Incr labetalol 300mgTID. Na 145, decreased FWF to 250q6. Start precedex. FENa consistent with intrinsic kidney injury. Pend repeat renal US. Retaining up to 1.3L, bladder scans q6, straight cath PRN  10/9: BD 9. GEORGE overnight. Neuro stable. abd xray for distention w non-specific gas pattern, OGT to LIWS for morning. duonebs/mucomyst to q8 for improving secretions. Changed tube feeds to Jevity 1.5 20cc/hr, low rate due to abdominal distention, nepro dense and more difficult to digest. Tolerating CPAP, confirmed by ABG.   10/10: BD 10. GEORGE overnight. Neuro stable. (+) gabriel for urinary retention on bladder scan. inc TF to goal rate of 40cc/hr. family leaning toward pursuing trach/PEG. 1/2 amp for FS 81.   10/11: BD 11. GEORGE overnight. Neuro stable. Trach/PEG consults placed.   10/12: BD 12. GEORGE overnight. Neuro stable. MRI brain complete.   10/13: BD 13. Increase flomax. Hold SQH after PM dose for trach tm. IVL.   10/14: BD 14. GEORGE overnight, remains on AC/VC. Gabriel placed for urinary retention. Dc'd free water.  S/p trach with pulm. NGT placed and CXR confirmed in good position.   10/15: BD 15, GEORGE ovn. resumed feeds. spiked 101, pan cx sent.   10/16: BD 16. GEORGE ovn. Lokelma 5mg for K+ 5.4. Started vanc q 24/zosyn for empiric PNA coverage, IVF to 100/hr. PEG held for fever.   10/17: BD 17,  ordered serum osm and urine osm for am. Started sinemet for neurostimulation. Increased cardura to 0.8. Started FW 100q4, dc'd IVF. MRSA negative, dc'd vanc. NGT replaced d/t coiling.   10/18: BD 18, EGORGE overnight, neuro stable. Amantadine added for neurostim. zosyn changed to unasyn for acinetobacter baumannii, failed TOV and required SC  10/19: BD 19, GEORGE ovn. cardura 2mg added for retention. labetalol decreased 200q8, hydralazine decreased 25q8. Gabriel replaced.   10/20: BD20, GEORGE overnight. NGT dislodged, replaced. PEG tomorrow w/ gen surg, FW increased to 150q4 and labetalol decreased to 100q8, lokelma given for hyperkalemia.   10/21: BD 21. POD0 PEG placement with Gen surg. decr labetolol to 50q8, incr. cardura to 0.4, started lokelma and phoslo, dc gabriel POD0 PEG placement with Gen surg.  10/22: BD 22. Plan to start TF today via PEG. dc labetalol, Following ophtho recs. Increased apnea settings - found to be in cheyne-moe respiration. CPAP 5/5.  10/23: BD 23. hydralazine d/c'd, trach collar trial today. Rectal tube placed at 6am.  10/24: BD24, o/n lokelma held due to diarrhea. Free water 100q6 resumed. dc'd tamsulosin, amantadine. Incr'd cardura to 8mg qhs. Dc'd FW. Switched jevity to nepro. gabriel placed for high urine output. Started SL atropine for oral secretions. Dc'd free water.  10/25: BD25, o/n decreased suctioning requirements to > q4hrs, GEORGE. Cr improving, cont phoslo, lokelma held at this time. Gabriel placed yest, cont. Tolerating trach collar. Given 500cc plasmalyte bolus for ANIKA. Dc'd sinemet.   10/26: BD26, o/n resumed lokelma 5mg daily and resumed 100cc free water q6hrs. Change in neuro status with new right pupillary dilation with anisocoria (right pupil 6mm fixed and left pupil 3mm briskly reactive). Given 23.4% NaCl bullet, taken for emergent CTH showing mostly resolved pontine hemorrhage, continued brainstem hypodensity likely edema d/t hemorrhage, no new hemorrhage or infarct, no herniation, mild increase in size of left lateral ventricle. Vitals remaine stable. Na goal > 140.   10/27: BD27, o/n GEORGE.Neuro stable. Pend stepdown with airway bed.   10/28: BD 28. GEORGE overnight. Neuro stable. Miralax ordered. Gabriel removed, pending TOV.  10/29: BD 29. GEORGE o/n. Given 2L NS over 8 hrs for increased BUN/Cr ratio. Gabriel placed for frequent straight cath.   10/30: BD 30.   10/31: BD 31. GEORGE overnight. Na 149, increased free water to 200q6. 1L NS for uptrending BUN.   11/1: BD 32. GEORGE overnight. Given 1L NS for dehydration. Na 146, increased FW to 250q6.   11/2: BD 33 GEORGE overnight, neuro stable, given 500cc bolus for net negative status and tachycardia   11/3: BD 34, GEORGE overnight, neuro stable. Patient remains tachycardic, EKG showing sinus tachycardia, given additional 500cc NS bolus. Febrile to 101.9F, pan cultured (without UA), CXR WNL, given tylenol.   11/4: BD 35, GEORGE overnight, neuro stable. Given 1L NS for tachycardia. sputum (+) for stenotropohomas maltophilia.   11/5: BD 36 GEORGE overnight, neuro stable. Vancomycin dc'd. Chest PT BID. ID consulted, cont zosyn.  11/6: BD 37. blood cx + klebsiella dc zosyn changed to cefepime, CTAP ordered, rpt blood cx sent.    11/7: BD 38. Pending CT A/P, given 250cc bolus and starting maintenance fluids overnight. Pending CT A/P after bolus   11/8: BD 39. CT CAP negative for infection.   11/9: BD 40. GEORGE overnight.  11/10: BD 41. GEORGE overnight. desat to 85 on trach collar, O2 inc to 10L and 100%, O2 sat inc to 95. pt tachy to 110s, euvolemic. given tylenol. ABG and CXR ordered. spiked fever, pancultured, RVP negative. AM ABG w pO2 79, rpt w pO2 79. pt appears comfortable, satting 94%.   11/11: BD 42. GEORGE overnight. pt became tachy to 130s, desat to 90 on 100% FiO2 and 10L. suctioned, (+) productive cough. temp 101.4, given 1g IV tylenol and 500cc NS bolus for euvolemia. fever and HR downtrending. LE dopplers negative for dvt  11/12: BD 43, GEORGE ovn, fever and HR downtrending, satting 97% 70% FIO2  11/13: BD 44, GEORGE ovn. started standing tylenol x24 hours for tachycardia. desat to 80s, o2 increased. CXR stable, pending CTA PE protocol.   11/14: BD 45, GEORGE overnight, neuro stable. resp therapy dec FiO2 to 70%.   11/15: BD 46, GEORGE overnight, neuro stable.  Rapid called for desaturation 30s, tachycardic 140s. Patient bagged, 100% fio2, heavily suctioned. CXR/POCUS unremarkable. ABG c/w desaturation. WBC 14.71. Afebrile. O2 improved to 90s and patient upgraded to ICU. ABG paO2 30s improved to 89 on vent. IV Tylenol x 1, sputum sent. Start protonix while o-n vent.   11/16: BD 47. POCUS showed collapsable IVCF, given 1L bolus. Vanco/Cefpime added empriic for PNA, NGT feeds restarted. MRSA swab neg, Vanc DC'd.   11/17: BD 48. GEORGE overnight. 1l bolus for tachycardia. Spiked to 101, cultured. 500cc bolus for tachycardia, tachy to 148 given 25mcg fentanyl, 250cc albumin, 1.5L bolus. 5 IV lopressor with response HR to 100s. +Stenotrophomonas on sputum cx.   11/18: BD 49. GEORGE overnight. Consulted ID, cefepime switched to bactrim x7days. Started hydrochlorothiazine 12.5mg daily.  11/19: BD 50. Tachy 120s, given tylenol and 500cc NS. Tolerating 5/5, switched to TCx. Holding phos binder. D/c Bactrim. D/c gabriel, f/u TOV. Dc'd PPI.   11/20: BD 51. GEORGE ovn. 1600 satting low 90s, mildly tachy to 110s, afebrile, RR wnl. O2 improved to mid 90s while inc O2 to 100% on TCx. CPAP 5/5 placed back on.  11/21: BD 52, GEORGE ovn, tolerating CPAP 5/5. Switch to trach collar during the day if tolerating well. HCTZ held for Cr bump, straight cath frequence increased to q4  11/22: BD 53, GEORGE ovn. Resumed phoslo. Gabriel placed. Resumed HCTZ.   11/23: BD 54. Holding tylenol in setting of possible fever, will require pan cx if febrile. Cr improved today. Cont CPAP. Bowel regimen held i/s/o diarrhea. FOBT negative.  11/24: BD 55. GEORGE overnight. Neuro stable. HCTZ dc'ed, started lisinopril 5. Lokelma dc'ed for K 3.7.   11/25: BD 56, GEORGE overnight. Neuro stable. dc'd lisinopril 5mg. Gabriel dc'd. TOV. 1545 noted to be hypotensive, MAP 50, in supine position on chair, HR 60s, afebrile, O2 96%. Given 1L cc NS bolus, placed back on bed in reverse trendelenberg, improved to map of 66. Neostick at bedside. Vitals check q1h. Dc'ed amlodipine. Failed TOV, bladder scan q6, sc prn. Added back Senna.   11/26: BD 57, GEORGE overnight. Neuro stable. Dc'd phoslo.   11/27: BD 58, GEORGE overnight. Neuro stable.    11/28: BD 59. Gabriel replaced.   11/29: GEORGE.  11/30: GEORGE, neuro stable.   12/1: BD 62, GEORGE overnight  12/2: BD 63, GEORGE overnight.; Given 1L bolus of LR for uptrending BUN/Cr.  12/3: BD 64. Reinstated eye gtt/moisture chamber given increased Rt eye injection  12/4: BD 65. GEORGE overnight. Attempted to speak with ophtho regarding eyelid weight/closure but no answer, full mailbox.   12/5: BD 66, GEORGE overnight, bowel regimen increased and had BM.   12/6: BD 67, GEORGE overnight, neuro stable.  12/7: GEORGE overnight, neuro stable. /110s, given x1 hydralazine 10 mg IVP. Restarted home amlodipine 5mg.  12/8: GEORGE. OOB to chair.     12/11: GEORGE, mucomyst added for thick secretions, simethicone for abd distension, abd xray with stool burden, increased bowel regimen.   12/12: GEORGE overnight.   12/13: GEORGE overnight.   12/14: GEORGE overnight  12/15: o/n Patient became tachycardic HR 120s and 10 minutes later O2 sat dropped as low as 89%. Patient suctioned without improvement in O2 sat and tube feeds found in suction catheter. TFs held.  STAT CXR ordered. STAT labs sent. Respiratory therapist called to bedside and patient trach connected to ventilator. After connection to ventilator and further suctioning O2 sat improved to 97% but patient HR remains 120-130s. Upgraded to NSICU for further management.            MEDICATIONS  (STANDING):  acetylcysteine 10%  Inhalation 4 milliLiter(s) Inhalation every 6 hours  albuterol/ipratropium for Nebulization 3 milliLiter(s) Nebulizer every 6 hours  amLODIPine   Tablet 5 milliGRAM(s) Oral daily  artificial tears (preservative free) Ophthalmic Solution 1 Drop(s) Right EYE every 4 hours  chlorhexidine 0.12% Liquid 15 milliLiter(s) Oral Mucosa every 12 hours  chlorhexidine 2% Cloths 1 Application(s) Topical <User Schedule>  doxazosin 8 milliGRAM(s) Oral at bedtime  erythromycin   Ointment 1 Application(s) Right EYE at bedtime  heparin   Injectable 5000 Unit(s) SubCutaneous every 8 hours  ofloxacin 0.3% Solution 1 Drop(s) Right EYE four times a day  pantoprazole  Injectable 40 milliGRAM(s) IV Push daily  petrolatum Ophthalmic Ointment 1 Application(s) Both EYES two times a day  piperacillin/tazobactam IVPB. 3.375 Gram(s) IV Intermittent once  piperacillin/tazobactam IVPB.- 3.375 Gram(s) IV Intermittent once  piperacillin/tazobactam IVPB.. 3.375 Gram(s) IV Intermittent every 8 hours  polyethylene glycol 3350 17 Gram(s) Oral every 12 hours  senna 2 Tablet(s) Oral every 12 hours  simethicone 80 milliGRAM(s) Chew three times a day  vancomycin  IVPB 1250 milliGRAM(s) IV Intermittent every 12 hours    MEDICATIONS  (PRN):  acetaminophen   Oral Liquid .. 650 milliGRAM(s) Oral every 6 hours PRN Temp greater or equal to 38.5C (101.3F), Mild Pain (1 - 3)    Examination   ICU Vital Signs Last 24 Hrs  T(C): 37.6 (14 Dec 2024 22:14), Max: 37.8 (14 Dec 2024 05:06)  T(F): 99.6 (14 Dec 2024 22:14), Max: 100.1 (14 Dec 2024 05:06)  HR: 84 (14 Dec 2024 20:20) (76 - 92)  BP: 144/99 (14 Dec 2024 20:20) (109/78 - 144/99)  BP(mean): 116 (14 Dec 2024 20:20) (89 - 116)  RR: 18 (14 Dec 2024 20:20) (16 - 18)  SpO2: 100% (14 Dec 2024 20:20) (92% - 100%)  O2 Parameters below as of 14 Dec 2024 20:20  Patient On (Oxygen Delivery Method): tracheostomy collar  O2 Flow (L/min): 10  O2 Concentration (%): 40  Gen: awake, on track collar O2 via mask, appears in distress  CV: tachycardic 120s  Lungs: reduced inspiratory sounds bilaterally, no wheezing  Abdomen - ND/NT, BS+  Extremities: peripheral pulses present/symmetric  Neurological exam   Mental status: awake, briefly attend, does not fully track (extraocular mov impaired), not consistently following commands  CNs:       Relevant labs and imaging reviewed   Neurocritical Care Attending Note     HPI -  45 y/o PMH ?stroke/MI initially admitted on 9/30 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 s/p peg 10/21. Prolonged hospital stay inability to transfer to Rehab due to lack of insurance.   On 12/15 AM while in Stepdown unit patient became tachycardic HR 120s and 10 minutes later O2 sat dropped as low as 89%. Patient suctioned without improvement in O2 sat and tube feeds found in suction catheter. TFs held.  STAT CXR ordered. STAT labs sent. Respiratory therapist called to bedside and patient trach connected to ventilator. After connection to ventilator and further suctioning O2 sat improved to 97% but patient HR remains 120-130s. Upgraded to NSICU for further management.     Detailed Hospital Course/Interval Events   9/30: transferred from [facility]. A line placed. Versed dc'd. Cy Rader at bedside, states pt has family in [place], cannot confirm medications or PMH other than stroke and MI. 250cc bolus 3% given. LR switched to NS. hydralazine 25q8 started, 3% started, switched propofol to precedex   10/1: stability CTH done. Added labetalol, started TF. Palliative consulted. ethics consulted to determine surrogate. febrile 103, pan cx sent  10/2: BD 2, GEORGE overnight. TF resumed. Desatt'd to 80s, FiO2 inc. to 50. Fentanyl given, ABG, CXR ordered. Maxxed on precedex, started on propofol for DARIEN -4 - -5. Precedex dc'd. Duonebs, mucomyst, hypertonic added. 3% dc'd. Cardene dc'd. Start vanc/CTX. Increased labetalol 200q8. MRSA negative, dc'd vanc. ETT pulled back 2cm x 2, good positioning after confirmatory chest xray. Ethics attempting to establish HCP with family. Na 159, starting FW 250q6 for range 150-155.   10/3: BD3, GEORGE o/n, neuro stable. Na elevating, FW increased to 300q6. Dc'd bowel reg for diarrhea. vEEG started. SQH 5000q8 tonight.   10/4: BD 4, albumn bolus, incr. LR to 80 2/2 incr. in Cr, LR to 100cc/hr for uptrending Cr. Started 7% hypersal for 48hrs and SL atropine for inline/oral thick secretions. Dc'd CTX and started ancef for MSSA in the sputum. Nephrology consulted for CKD, f/u recs. SBP 170s, given hydralazine 10mg IVP.   10/5: BD5, o/n 10mg IVP hydralazine given for SBP 170s and started on hydralazine 25q8 via OGT. 10mg IV push labetalol for SBP > 160s. RT placed for diarrhea.   10/6: BD6, o/n FW increased to 350q4 per nephrology recs. IV tylenol for temp 100.6, SBp 160s presumed uncomfortable.   10/7: BD7, overnight pancultured for temp 101.8F.   10/8: BD8. GEORGE. Cr bumped. decreased LR to 75cc/hr. Adding simethicone ATC. incr hydralazine 50mgTID. Incr labetalol 300mgTID. Na 145, decreased FWF to 250q6. Start precedex. FENa consistent with intrinsic kidney injury. Pend repeat renal US. Retaining up to 1.3L, bladder scans q6, straight cath PRN  10/9: BD 9. GEORGE overnight. Neuro stable. abd xray for distention w non-specific gas pattern, OGT to LIWS for morning. duonebs/mucomyst to q8 for improving secretions. Changed tube feeds to Jevity 1.5 20cc/hr, low rate due to abdominal distention, nepro dense and more difficult to digest. Tolerating CPAP, confirmed by ABG.   10/10: BD 10. GEORGE overnight. Neuro stable. (+) gabriel for urinary retention on bladder scan. inc TF to goal rate of 40cc/hr. family leaning toward pursuing trach/PEG. 1/2 amp for FS 81.   10/11: BD 11. GEORGE overnight. Neuro stable. Trach/PEG consults placed.   10/12: BD 12. GEORGE overnight. Neuro stable. MRI brain complete.   10/13: BD 13. Increase flomax. Hold SQH after PM dose for trach tm. IVL.   10/14: BD 14. GEORGE overnight, remains on AC/VC. Gabriel placed for urinary retention. Dc'd free water.  S/p trach with pulm. NGT placed and CXR confirmed in good position.   10/15: BD 15, GEORGE ovn. resumed feeds. spiked 101, pan cx sent.   10/16: BD 16. GEORGE ovn. Lokelma 5mg for K+ 5.4. Started vanc q 24/zosyn for empiric PNA coverage, IVF to 100/hr. PEG held for fever.   10/17: BD 17,  ordered serum osm and urine osm for am. Started sinemet for neurostimulation. Increased cardura to 0.8. Started FW 100q4, dc'd IVF. MRSA negative, dc'd vanc. NGT replaced d/t coiling.   10/18: BD 18, GEORGE overnight, neuro stable. Amantadine added for neurostim. zosyn changed to unasyn for acinetobacter baumannii, failed TOV and required SC  10/19: BD 19, GEORGE ovn. cardura 2mg added for retention. labetalol decreased 200q8, hydralazine decreased 25q8. Gabriel replaced.   10/20: BD20, GEORGE overnight. NGT dislodged, replaced. PEG tomorrow w/ gen surg, FW increased to 150q4 and labetalol decreased to 100q8, lokelma given for hyperkalemia.   10/21: BD 21. POD0 PEG placement with Gen surg. decr labetolol to 50q8, incr. cardura to 0.4, started lokelma and phoslo, dc gabriel POD0 PEG placement with Gen surg.  10/22: BD 22. Plan to start TF today via PEG. dc labetalol, Following ophtho recs. Increased apnea settings - found to be in cheyne-moe respiration. CPAP 5/5.  10/23: BD 23. hydralazine d/c'd, trach collar trial today. Rectal tube placed at 6am.  10/24: BD24, o/n lokelma held due to diarrhea. Free water 100q6 resumed. dc'd tamsulosin, amantadine. Incr'd cardura to 8mg qhs. Dc'd FW. Switched jevity to nepro. gabriel placed for high urine output. Started SL atropine for oral secretions. Dc'd free water.  10/25: BD25, o/n decreased suctioning requirements to > q4hrs, GEORGE. Cr improving, cont phoslo, lokelma held at this time. Gabriel placed yest, cont. Tolerating trach collar. Given 500cc plasmalyte bolus for ANIKA. Dc'd sinemet.   10/26: BD26, o/n resumed lokelma 5mg daily and resumed 100cc free water q6hrs. Change in neuro status with new right pupillary dilation with anisocoria (right pupil 6mm fixed and left pupil 3mm briskly reactive). Given 23.4% NaCl bullet, taken for emergent CTH showing mostly resolved pontine hemorrhage, continued brainstem hypodensity likely edema d/t hemorrhage, no new hemorrhage or infarct, no herniation, mild increase in size of left lateral ventricle. Vitals remaine stable. Na goal > 140.   10/27: BD27, o/n GEORGE.Neuro stable. Pend stepdown with airway bed.   10/28: BD 28. GEORGE overnight. Neuro stable. Miralax ordered. Gabriel removed, pending TOV.  10/29: BD 29. GEORGE o/n. Given 2L NS over 8 hrs for increased BUN/Cr ratio. Gabriel placed for frequent straight cath.   10/30: BD 30.   10/31: BD 31. GEORGE overnight. Na 149, increased free water to 200q6. 1L NS for uptrending BUN.   11/1: BD 32. GEORGE overnight. Given 1L NS for dehydration. Na 146, increased FW to 250q6.   11/2: BD 33 GEORGE overnight, neuro stable, given 500cc bolus for net negative status and tachycardia   11/3: BD 34, GEORGE overnight, neuro stable. Patient remains tachycardic, EKG showing sinus tachycardia, given additional 500cc NS bolus. Febrile to 101.9F, pan cultured (without UA), CXR WNL, given tylenol.   11/4: BD 35, GEORGE overnight, neuro stable. Given 1L NS for tachycardia. sputum (+) for stenotropohomas maltophilia.   11/5: BD 36 GEORGE overnight, neuro stable. Vancomycin dc'd. Chest PT BID. ID consulted, cont zosyn.  11/6: BD 37. blood cx + klebsiella dc zosyn changed to cefepime, CTAP ordered, rpt blood cx sent.    11/7: BD 38. Pending CT A/P, given 250cc bolus and starting maintenance fluids overnight. Pending CT A/P after bolus   11/8: BD 39. CT CAP negative for infection.   11/9: BD 40. GEORGE overnight.  11/10: BD 41. GEORGE overnight. desat to 85 on trach collar, O2 inc to 10L and 100%, O2 sat inc to 95. pt tachy to 110s, euvolemic. given tylenol. ABG and CXR ordered. spiked fever, pancultured, RVP negative. AM ABG w pO2 79, rpt w pO2 79. pt appears comfortable, satting 94%.   11/11: BD 42. GEORGE overnight. pt became tachy to 130s, desat to 90 on 100% FiO2 and 10L. suctioned, (+) productive cough. temp 101.4, given 1g IV tylenol and 500cc NS bolus for euvolemia. fever and HR downtrending. LE dopplers negative for dvt  11/12: BD 43, GEORGE ovn, fever and HR downtrending, satting 97% 70% FIO2  11/13: BD 44, GEORGE ovn. started standing tylenol x24 hours for tachycardia. desat to 80s, o2 increased. CXR stable, pending CTA PE protocol.   11/14: BD 45, GEORGE overnight, neuro stable. resp therapy dec FiO2 to 70%.   11/15: BD 46, GEORGE overnight, neuro stable.  Rapid called for desaturation 30s, tachycardic 140s. Patient bagged, 100% fio2, heavily suctioned. CXR/POCUS unremarkable. ABG c/w desaturation. WBC 14.71. Afebrile. O2 improved to 90s and patient upgraded to ICU. ABG paO2 30s improved to 89 on vent. IV Tylenol x 1, sputum sent. Start protonix while o-n vent.   11/16: BD 47. POCUS showed collapsable IVCF, given 1L bolus. Vanco/Cefpime added empriic for PNA, NGT feeds restarted. MRSA swab neg, Vanc DC'd.   11/17: BD 48. GEORGE overnight. 1l bolus for tachycardia. Spiked to 101, cultured. 500cc bolus for tachycardia, tachy to 148 given 25mcg fentanyl, 250cc albumin, 1.5L bolus. 5 IV lopressor with response HR to 100s. +Stenotrophomonas on sputum cx.   11/18: BD 49. GEORGE overnight. Consulted ID, cefepime switched to bactrim x7days. Started hydrochlorothiazine 12.5mg daily.  11/19: BD 50. Tachy 120s, given tylenol and 500cc NS. Tolerating 5/5, switched to TCx. Holding phos binder. D/c Bactrim. D/c gabriel, f/u TOV. Dc'd PPI.   11/20: BD 51. GEORGE ovn. 1600 satting low 90s, mildly tachy to 110s, afebrile, RR wnl. O2 improved to mid 90s while inc O2 to 100% on TCx. CPAP 5/5 placed back on.  11/21: BD 52, GEORGE ovn, tolerating CPAP 5/5. Switch to trach collar during the day if tolerating well. HCTZ held for Cr bump, straight cath frequence increased to q4  11/22: BD 53, GEORGE ovn. Resumed phoslo. Gabriel placed. Resumed HCTZ.   11/23: BD 54. Holding tylenol in setting of possible fever, will require pan cx if febrile. Cr improved today. Cont CPAP. Bowel regimen held i/s/o diarrhea. FOBT negative.  11/24: BD 55. GEORGE overnight. Neuro stable. HCTZ dc'ed, started lisinopril 5. Lokelma dc'ed for K 3.7.   11/25: BD 56, GEORGE overnight. Neuro stable. dc'd lisinopril 5mg. Gabriel dc'd. TOV. 1545 noted to be hypotensive, MAP 50, in supine position on chair, HR 60s, afebrile, O2 96%. Given 1L cc NS bolus, placed back on bed in reverse trendelenberg, improved to map of 66. Neostick at bedside. Vitals check q1h. Dc'ed amlodipine. Failed TOV, bladder scan q6, sc prn. Added back Senna.   11/26: BD 57, GEORGE overnight. Neuro stable. Dc'd phoslo.   11/27: BD 58, GEORGE overnight. Neuro stable.    11/28: BD 59. Gabriel replaced.   11/29: GEORGE.  11/30: GEORGE, neuro stable.   12/1: BD 62, GEORGE overnight  12/2: BD 63, GEORGE overnight.; Given 1L bolus of LR for uptrending BUN/Cr.  12/3: BD 64. Reinstated eye gtt/moisture chamber given increased Rt eye injection  12/4: BD 65. GEORGE overnight. Attempted to speak with ophtho regarding eyelid weight/closure but no answer, full mailbox.   12/5: BD 66, GEORGE overnight, bowel regimen increased and had BM.   12/6: BD 67, GEORGE overnight, neuro stable.  12/7: GEORGE overnight, neuro stable. /110s, given x1 hydralazine 10 mg IVP. Restarted home amlodipine 5mg.  12/8: GEORGE. OOB to chair.     12/11: GEORGE, mucomyst added for thick secretions, simethicone for abd distension, abd xray with stool burden, increased bowel regimen.   12/12: GEORGE overnight.   12/13: GEORGE overnight.   12/14: GEORGE overnight  12/15: o/n Patient became tachycardic HR 120s and 10 minutes later O2 sat dropped as low as 89%. Patient suctioned without improvement in O2 sat and tube feeds found in suction catheter. TFs held.  STAT CXR ordered. STAT labs sent. Respiratory therapist called to bedside and patient trach connected to ventilator. After connection to ventilator and further suctioning O2 sat improved to 97% but patient HR remains 120-130s. Upgraded to NSICU for further management. Vancomycin and zosyn started. CTA  chest PE protocol and CTH ordered. Blood cultures sent.given 500cc bolus, rpt ABG sent pO2 243, CTH and CTA chest done. FS while NPO, FiO2 dec 50 pending ABG. sputum cx positive for few GPC and GNR.         MEDICATIONS  (STANDING):  acetylcysteine 10%  Inhalation 4 milliLiter(s) Inhalation every 6 hours  albuterol/ipratropium for Nebulization 3 milliLiter(s) Nebulizer every 6 hours  artificial tears (preservative free) Ophthalmic Solution 1 Drop(s) Right EYE every 4 hours  chlorhexidine 0.12% Liquid 15 milliLiter(s) Oral Mucosa every 12 hours  chlorhexidine 2% Cloths 1 Application(s) Topical <User Schedule>  doxazosin 8 milliGRAM(s) Oral at bedtime  erythromycin   Ointment 1 Application(s) Right EYE at bedtime  heparin   Injectable 5000 Unit(s) SubCutaneous every 8 hours  insulin lispro (ADMELOG) corrective regimen sliding scale   SubCutaneous Before meals and at bedtime  ofloxacin 0.3% Solution 1 Drop(s) Right EYE four times a day  pantoprazole  Injectable 40 milliGRAM(s) IV Push daily  petrolatum Ophthalmic Ointment 1 Application(s) Both EYES two times a day  piperacillin/tazobactam IVPB.. 3.375 Gram(s) IV Intermittent every 8 hours  polyethylene glycol 3350 17 Gram(s) Oral every 12 hours  senna 2 Tablet(s) Oral every 12 hours  simethicone 80 milliGRAM(s) Chew three times a day  sodium chloride 0.9%. 1000 milliLiter(s) (75 mL/Hr) IV Continuous <Continuous>  vancomycin  IVPB 1250 milliGRAM(s) IV Intermittent every 12 hours    MEDICATIONS  (PRN):  acetaminophen   Oral Liquid .. 650 milliGRAM(s) Oral every 6 hours PRN Temp greater or equal to 38.5C (101.3F), Mild Pain (1 - 3)      EXAMINATION   ICU Vital Signs Last 24 Hrs  T(C): 36.4 (15 Dec 2024 18:00), Max: 37.6 (14 Dec 2024 22:14)  T(F): 97.5 (15 Dec 2024 18:00), Max: 99.6 (14 Dec 2024 22:14)  HR: 71 (15 Dec 2024 20:20) (71 - 126)  BP: 130/93 (15 Dec 2024 20:00) (115/85 - 156/107)  BP(mean): 107 (15 Dec 2024 20:00) (96 - 125)  RR: 14 (15 Dec 2024 20:00) (14 - 22)  SpO2: 100% (15 Dec 2024 20:20) (92% - 100%)  GEN: young man, lying in bed, track in place    Relevant labs and imaging reviewed   Neurocritical Care Attending Note     HPI -  43 y/o PMH ?stroke/MI initially admitted on 9/30 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 s/p peg 10/21. Prolonged hospital stay inability to transfer to Rehab due to lack of insurance.   On 12/15 AM while in Stepdown unit patient became tachycardic HR 120s and 10 minutes later O2 sat dropped as low as 89%. Patient suctioned without improvement in O2 sat and tube feeds found in suction catheter. TFs held.  STAT CXR ordered. STAT labs sent. Respiratory therapist called to bedside and patient trach connected to ventilator. After connection to ventilator and further suctioning O2 sat improved to 97% but patient HR remains 120-130s. Upgraded to NSICU for further management.    Detailed Hospital Course/Interval Events   9/30: transferred from [facility]. A line placed. Versed dc'd. Cy Rader at bedside, states pt has family in [place], cannot confirm medications or PMH other than stroke and MI. 250cc bolus 3% given. LR switched to NS. hydralazine 25q8 started, 3% started, switched propofol to precedex   10/1: stability CTH done. Added labetalol, started TF. Palliative consulted. ethics consulted to determine surrogate. febrile 103, pan cx sent  10/2: BD 2, GEORGE overnight. TF resumed. Desatt'd to 80s, FiO2 inc. to 50. Fentanyl given, ABG, CXR ordered. Maxxed on precedex, started on propofol for DARIEN -4 - -5. Precedex dc'd. Duonebs, mucomyst, hypertonic added. 3% dc'd. Cardene dc'd. Start vanc/CTX. Increased labetalol 200q8. MRSA negative, dc'd vanc. ETT pulled back 2cm x 2, good positioning after confirmatory chest xray. Ethics attempting to establish HCP with family. Na 159, starting FW 250q6 for range 150-155.   10/3: BD3, GEORGE o/n, neuro stable. Na elevating, FW increased to 300q6. Dc'd bowel reg for diarrhea. vEEG started. SQH 5000q8 tonight.   10/4: BD 4, albumn bolus, incr. LR to 80 2/2 incr. in Cr, LR to 100cc/hr for uptrending Cr. Started 7% hypersal for 48hrs and SL atropine for inline/oral thick secretions. Dc'd CTX and started ancef for MSSA in the sputum. Nephrology consulted for CKD, f/u recs. SBP 170s, given hydralazine 10mg IVP.   10/5: BD5, o/n 10mg IVP hydralazine given for SBP 170s and started on hydralazine 25q8 via OGT. 10mg IV push labetalol for SBP > 160s. RT placed for diarrhea.   10/6: BD6, o/n FW increased to 350q4 per nephrology recs. IV tylenol for temp 100.6, SBp 160s presumed uncomfortable.   10/7: BD7, overnight pancultured for temp 101.8F.   10/8: BD8. GEORGE. Cr bumped. decreased LR to 75cc/hr. Adding simethicone ATC. incr hydralazine 50mgTID. Incr labetalol 300mgTID. Na 145, decreased FWF to 250q6. Start precedex. FENa consistent with intrinsic kidney injury. Pend repeat renal US. Retaining up to 1.3L, bladder scans q6, straight cath PRN  10/9: BD 9. GEORGE overnight. Neuro stable. abd xray for distention w non-specific gas pattern, OGT to LIWS for morning. duonebs/mucomyst to q8 for improving secretions. Changed tube feeds to Jevity 1.5 20cc/hr, low rate due to abdominal distention, nepro dense and more difficult to digest. Tolerating CPAP, confirmed by ABG.   10/10: BD 10. GEORGE overnight. Neuro stable. (+) gabriel for urinary retention on bladder scan. inc TF to goal rate of 40cc/hr. family leaning toward pursuing trach/PEG. 1/2 amp for FS 81.   10/11: BD 11. GEORGE overnight. Neuro stable. Trach/PEG consults placed.   10/12: BD 12. GEORGE overnight. Neuro stable. MRI brain complete.   10/13: BD 13. Increase flomax. Hold SQH after PM dose for trach tm. IVL.   10/14: BD 14. GEORGE overnight, remains on AC/VC. Gabriel placed for urinary retention. Dc'd free water.  S/p trach with pulm. NGT placed and CXR confirmed in good position.   10/15: BD 15, GEORGE ovn. resumed feeds. spiked 101, pan cx sent.   10/16: BD 16. GEORGE ovn. Lokelma 5mg for K+ 5.4. Started vanc q 24/zosyn for empiric PNA coverage, IVF to 100/hr. PEG held for fever.   10/17: BD 17,  ordered serum osm and urine osm for am. Started sinemet for neurostimulation. Increased cardura to 0.8. Started FW 100q4, dc'd IVF. MRSA negative, dc'd vanc. NGT replaced d/t coiling.   10/18: BD 18, GEORGE overnight, neuro stable. Amantadine added for neurostim. zosyn changed to unasyn for acinetobacter baumannii, failed TOV and required SC  10/19: BD 19, GEORGE ovn. cardura 2mg added for retention. labetalol decreased 200q8, hydralazine decreased 25q8. Gabriel replaced.   10/20: BD20, GEORGE overnight. NGT dislodged, replaced. PEG tomorrow w/ gen surg, FW increased to 150q4 and labetalol decreased to 100q8, lokelma given for hyperkalemia.   10/21: BD 21. POD0 PEG placement with Gen surg. decr labetolol to 50q8, incr. cardura to 0.4, started lokelma and phoslo, dc gabriel POD0 PEG placement with Gen surg.  10/22: BD 22. Plan to start TF today via PEG. dc labetalol, Following ophtho recs. Increased apnea settings - found to be in cheyne-moe respiration. CPAP 5/5.  10/23: BD 23. hydralazine d/c'd, trach collar trial today. Rectal tube placed at 6am.  10/24: BD24, o/n lokelma held due to diarrhea. Free water 100q6 resumed. dc'd tamsulosin, amantadine. Incr'd cardura to 8mg qhs. Dc'd FW. Switched jevity to nepro. gabriel placed for high urine output. Started SL atropine for oral secretions. Dc'd free water.  10/25: BD25, o/n decreased suctioning requirements to > q4hrs, GEORGE. Cr improving, cont phoslo, lokelma held at this time. Gabriel placed yest, cont. Tolerating trach collar. Given 500cc plasmalyte bolus for ANIKA. Dc'd sinemet.   10/26: BD26, o/n resumed lokelma 5mg daily and resumed 100cc free water q6hrs. Change in neuro status with new right pupillary dilation with anisocoria (right pupil 6mm fixed and left pupil 3mm briskly reactive). Given 23.4% NaCl bullet, taken for emergent CTH showing mostly resolved pontine hemorrhage, continued brainstem hypodensity likely edema d/t hemorrhage, no new hemorrhage or infarct, no herniation, mild increase in size of left lateral ventricle. Vitals remaine stable. Na goal > 140.   10/27: BD27, o/n GEORGE.Neuro stable. Pend stepdown with airway bed.   10/28: BD 28. GEORGE overnight. Neuro stable. Miralax ordered. Gabriel removed, pending TOV.  10/29: BD 29. GEORGE o/n. Given 2L NS over 8 hrs for increased BUN/Cr ratio. Gabriel placed for frequent straight cath.   10/30: BD 30.   10/31: BD 31. GEORGE overnight. Na 149, increased free water to 200q6. 1L NS for uptrending BUN.   11/1: BD 32. GEORGE overnight. Given 1L NS for dehydration. Na 146, increased FW to 250q6.   11/2: BD 33 GEORGE overnight, neuro stable, given 500cc bolus for net negative status and tachycardia   11/3: BD 34, GEORGE overnight, neuro stable. Patient remains tachycardic, EKG showing sinus tachycardia, given additional 500cc NS bolus. Febrile to 101.9F, pan cultured (without UA), CXR WNL, given tylenol.   11/4: BD 35, GEORGE overnight, neuro stable. Given 1L NS for tachycardia. sputum (+) for stenotropohomas maltophilia.   11/5: BD 36 GEORGE overnight, neuro stable. Vancomycin dc'd. Chest PT BID. ID consulted, cont zosyn.  11/6: BD 37. blood cx + klebsiella dc zosyn changed to cefepime, CTAP ordered, rpt blood cx sent.    11/7: BD 38. Pending CT A/P, given 250cc bolus and starting maintenance fluids overnight. Pending CT A/P after bolus   11/8: BD 39. CT CAP negative for infection.   11/9: BD 40. GEORGE overnight.  11/10: BD 41. GEORGE overnight. desat to 85 on trach collar, O2 inc to 10L and 100%, O2 sat inc to 95. pt tachy to 110s, euvolemic. given tylenol. ABG and CXR ordered. spiked fever, pancultured, RVP negative. AM ABG w pO2 79, rpt w pO2 79. pt appears comfortable, satting 94%.   11/11: BD 42. GEORGE overnight. pt became tachy to 130s, desat to 90 on 100% FiO2 and 10L. suctioned, (+) productive cough. temp 101.4, given 1g IV tylenol and 500cc NS bolus for euvolemia. fever and HR downtrending. LE dopplers negative for dvt  11/12: BD 43, GEORGE ovn, fever and HR downtrending, satting 97% 70% FIO2  11/13: BD 44, GEORGE ovn. started standing tylenol x24 hours for tachycardia. desat to 80s, o2 increased. CXR stable, pending CTA PE protocol.   11/14: BD 45, GEORGE overnight, neuro stable. resp therapy dec FiO2 to 70%.   11/15: BD 46, GEORGE overnight, neuro stable.  Rapid called for desaturation 30s, tachycardic 140s. Patient bagged, 100% fio2, heavily suctioned. CXR/POCUS unremarkable. ABG c/w desaturation. WBC 14.71. Afebrile. O2 improved to 90s and patient upgraded to ICU. ABG paO2 30s improved to 89 on vent. IV Tylenol x 1, sputum sent. Start protonix while o-n vent.   11/16: BD 47. POCUS showed collapsable IVCF, given 1L bolus. Vanco/Cefpime added empriic for PNA, NGT feeds restarted. MRSA swab neg, Vanc DC'd.   11/17: BD 48. GEORGE overnight. 1l bolus for tachycardia. Spiked to 101, cultured. 500cc bolus for tachycardia, tachy to 148 given 25mcg fentanyl, 250cc albumin, 1.5L bolus. 5 IV lopressor with response HR to 100s. +Stenotrophomonas on sputum cx.   11/18: BD 49. GEORGE overnight. Consulted ID, cefepime switched to bactrim x7days. Started hydrochlorothiazine 12.5mg daily.  11/19: BD 50. Tachy 120s, given tylenol and 500cc NS. Tolerating 5/5, switched to TCx. Holding phos binder. D/c Bactrim. D/c gabriel, f/u TOV. Dc'd PPI.   11/20: BD 51. GEORGE ovn. 1600 satting low 90s, mildly tachy to 110s, afebrile, RR wnl. O2 improved to mid 90s while inc O2 to 100% on TCx. CPAP 5/5 placed back on.  11/21: BD 52, GEORGE ovn, tolerating CPAP 5/5. Switch to trach collar during the day if tolerating well. HCTZ held for Cr bump, straight cath frequence increased to q4  11/22: BD 53, GEORGE ovn. Resumed phoslo. Gabriel placed. Resumed HCTZ.   11/23: BD 54. Holding tylenol in setting of possible fever, will require pan cx if febrile. Cr improved today. Cont CPAP. Bowel regimen held i/s/o diarrhea. FOBT negative.  11/24: BD 55. GEORGE overnight. Neuro stable. HCTZ dc'ed, started lisinopril 5. Lokelma dc'ed for K 3.7.   11/25: BD 56, GEORGE overnight. Neuro stable. dc'd lisinopril 5mg. Gabriel dc'd. TOV. 1545 noted to be hypotensive, MAP 50, in supine position on chair, HR 60s, afebrile, O2 96%. Given 1L cc NS bolus, placed back on bed in reverse trendelenberg, improved to map of 66. Neostick at bedside. Vitals check q1h. Dc'ed amlodipine. Failed TOV, bladder scan q6, sc prn. Added back Senna.   11/26: BD 57, GEORGE overnight. Neuro stable. Dc'd phoslo.   11/27: BD 58, GEORGE overnight. Neuro stable.    11/28: BD 59. Gabriel replaced.   11/29: GEORGE.  11/30: GEORGE, neuro stable.   12/1: BD 62, GEORGE overnight  12/2: BD 63, GEORGE overnight.; Given 1L bolus of LR for uptrending BUN/Cr.  12/3: BD 64. Reinstated eye gtt/moisture chamber given increased Rt eye injection  12/4: BD 65. GEORGE overnight. Attempted to speak with ophtho regarding eyelid weight/closure but no answer, full mailbox.   12/5: BD 66, GEORGE overnight, bowel regimen increased and had BM.   12/6: BD 67, GEORGE overnight, neuro stable.  12/7: GEORGE overnight, neuro stable. /110s, given x1 hydralazine 10 mg IVP. Restarted home amlodipine 5mg.  12/8: GEORGE. OOB to chair.     12/11: GEORGE, mucomyst added for thick secretions, simethicone for abd distension, abd xray with stool burden, increased bowel regimen.   12/12-12/14: GEORGE  12/15: o/n Patient became tachycardic HR 120s and 10 minutes later O2 sat dropped as low as 89%. Patient suctioned without improvement in O2 sat and tube feeds found in suction catheter. TFs held.  STAT CXR ordered. STAT labs sent. Respiratory therapist called to bedside and patient trach connected to ventilator. After connection to ventilator and further suctioning O2 sat improved to 97% but patient HR remains 120-130s. Upgraded to NSICU for further management. Vancomycin and zosyn started. CTA  chest PE protocol and CTH ordered. Blood cultures sent.given 500cc bolus, rpt ABG sent pO2 243, CTH and CTA chest done. FS while NPO, FiO2 dec 50 pending ABG. sputum cx positive for few GPC and GNR.         MEDICATIONS  (STANDING):  acetylcysteine 10%  Inhalation 4 milliLiter(s) Inhalation every 6 hours  albuterol/ipratropium for Nebulization 3 milliLiter(s) Nebulizer every 6 hours  artificial tears (preservative free) Ophthalmic Solution 1 Drop(s) Right EYE every 4 hours  chlorhexidine 0.12% Liquid 15 milliLiter(s) Oral Mucosa every 12 hours  chlorhexidine 2% Cloths 1 Application(s) Topical <User Schedule>  doxazosin 8 milliGRAM(s) Oral at bedtime  erythromycin   Ointment 1 Application(s) Right EYE at bedtime  heparin   Injectable 5000 Unit(s) SubCutaneous every 8 hours  insulin lispro (ADMELOG) corrective regimen sliding scale   SubCutaneous Before meals and at bedtime  ofloxacin 0.3% Solution 1 Drop(s) Right EYE four times a day  pantoprazole  Injectable 40 milliGRAM(s) IV Push daily  petrolatum Ophthalmic Ointment 1 Application(s) Both EYES two times a day  piperacillin/tazobactam IVPB.. 3.375 Gram(s) IV Intermittent every 8 hours  polyethylene glycol 3350 17 Gram(s) Oral every 12 hours  senna 2 Tablet(s) Oral every 12 hours  simethicone 80 milliGRAM(s) Chew three times a day  sodium chloride 0.9%. 1000 milliLiter(s) (50 mL/Hr) IV Continuous <Continuous>  vancomycin  IVPB 1250 milliGRAM(s) IV Intermittent every 12 hours    MEDICATIONS  (PRN):  acetaminophen   Oral Liquid .. 650 milliGRAM(s) Oral every 6 hours PRN Temp greater or equal to 38.5C (101.3F), Mild Pain (1 - 3)          EXAMINATION   ICU Vital Signs Last 24 Hrs  T(C): 36.4 (15 Dec 2024 22:00), Max: 36.7 (15 Dec 2024 04:12)  T(F): 97.5 (15 Dec 2024 22:00), Max: 98 (15 Dec 2024 04:12)  HR: 70 (15 Dec 2024 22:01) (70 - 126)  BP: 128/85 (15 Dec 2024 22:01) (115/85 - 156/107)  BP(mean): 103 (15 Dec 2024 22:01) (96 - 125)  RR: 14 (15 Dec 2024 22:01) (14 - 22)  SpO2: 100% (15 Dec 2024 22:01) (92% - 100%)  O2 Parameters below as of 15 Dec 2024 22:01  Patient On (Oxygen Delivery Method): ventilator  O2 Concentration (%): 40  GEN: young man, lying in bed, track in place  HEENT: track in place to vent  CV: regular s1/s2  Lungs: decrease respiratory sounds anteriorly, no wheezing  Extr: no edema, peripheral pulses symmetric +  Neurological exam   Mental status: awake, tracks on left and briefly attends, appropriately moving eyebrows to orientation question (oriented to self), following simple step commands (opening mouth, showing two fingers with right hand, wiggling right toes.  CNs: pupils right larger than left, reactive to light, extraocular movement impaired on right.   Motor: left hemiplegia, right some spontaneous movement antigravity at the wrist, wiggling right toes      Relevant labs and imaging reviewed

## 2024-12-15 NOTE — PROVIDER CONTACT NOTE (CRITICAL VALUE NOTIFICATION) - ASSESSMENT
Patient was found tachycardic in the 120s-130s and desatting in the mid 80s. Accessory muscles were being utilized which previously was not upo0n earlier assessment. When suctioned by trach, no secretions were found but when patient was suctioned orally, secretions resembling the tube feeds coloring was discharged.

## 2024-12-15 NOTE — PROVIDER CONTACT NOTE (CRITICAL VALUE NOTIFICATION) - ACTION/TREATMENT ORDERED:
Respiratory came to the bedside and put patietn on ventilator. ABG and labs drawn along with a chest x-ray to rule our aspiration of feeds. Pt transferred to ICU for further monitoring.

## 2024-12-15 NOTE — PROVIDER CONTACT NOTE (CHANGE IN STATUS NOTIFICATION) - ASSESSMENT
Pt found tachycardic and utilizing accessory muscles. RAMONA Anderson from Team Neuro immediately notified. Recommended Tylenol 650 mg given to patient for pain. Neuro and respiratory at bedside with suspicion of aspiration. Feeds immediately stopped. Chest x-ray ordered, labs drawn and patient sent to ICU for further monitoring.

## 2024-12-15 NOTE — PROGRESS NOTE ADULT - SUBJECTIVE AND OBJECTIVE BOX
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 s/p peg 10/21. Prolonged hospital stay inability to transfer to Rehab due to lack of insurance.   On 12/15 AM while in Stepdown unit patient became tachycardic HR 120s and 10 minutes later O2 sat dropped as low as 89%. Patient suctioned without improvement in O2 sat and tube feeds found in suction catheter. TFs held.  STAT CXR ordered. STAT labs sent. Respiratory therapist called to bedside and patient trach connected to ventilator. After connection to ventilator and further suctioning O2 sat improved to 97% but patient HR remains 120-130s. Upgraded to NSICU for further management.     Detailed Hospital Course/Interval Events   9/30: transferred from [facility]. A line placed. Versed dc'd. Cy Rader at bedside, states pt has family in [place], cannot confirm medications or PMH other than stroke and MI. 250cc bolus 3% given. LR switched to NS. hydralazine 25q8 started, 3% started, switched propofol to precedex   10/1: stability CTH done. Added labetalol, started TF. Palliative consulted. ethics consulted to determine surrogate. febrile 103, pan cx sent  10/2: BD 2, GEORGE overnight. TF resumed. Desatt'd to 80s, FiO2 inc. to 50. Fentanyl given, ABG, CXR ordered. Maxxed on precedex, started on propofol for DARIEN -4 - -5. Precedex dc'd. Duonebs, mucomyst, hypertonic added. 3% dc'd. Cardene dc'd. Start vanc/CTX. Increased labetalol 200q8. MRSA negative, dc'd vanc. ETT pulled back 2cm x 2, good positioning after confirmatory chest xray. Ethics attempting to establish HCP with family. Na 159, starting FW 250q6 for range 150-155.   10/3: BD3, GEORGE o/n, neuro stable. Na elevating, FW increased to 300q6. Dc'd bowel reg for diarrhea. vEEG started. SQH 5000q8 tonight.   10/4: BD 4, albumn bolus, incr. LR to 80 2/2 incr. in Cr, LR to 100cc/hr for uptrending Cr. Started 7% hypersal for 48hrs and SL atropine for inline/oral thick secretions. Dc'd CTX and started ancef for MSSA in the sputum. Nephrology consulted for CKD, f/u recs. SBP 170s, given hydralazine 10mg IVP.   10/5: BD5, o/n 10mg IVP hydralazine given for SBP 170s and started on hydralazine 25q8 via OGT. 10mg IV push labetalol for SBP > 160s. RT placed for diarrhea.   10/6: BD6, o/n FW increased to 350q4 per nephrology recs. IV tylenol for temp 100.6, SBp 160s presumed uncomfortable.   10/7: BD7, overnight pancultured for temp 101.8F.   10/8: BD8. GEORGE. Cr bumped. decreased LR to 75cc/hr. Adding simethicone ATC. incr hydralazine 50mgTID. Incr labetalol 300mgTID. Na 145, decreased FWF to 250q6. Start precedex. FENa consistent with intrinsic kidney injury. Pend repeat renal US. Retaining up to 1.3L, bladder scans q6, straight cath PRN  10/9: BD 9. GEORGE overnight. Neuro stable. abd xray for distention w non-specific gas pattern, OGT to LIWS for morning. duonebs/mucomyst to q8 for improving secretions. Changed tube feeds to Jevity 1.5 20cc/hr, low rate due to abdominal distention, nepro dense and more difficult to digest. Tolerating CPAP, confirmed by ABG.   10/10: BD 10. GEORGE overnight. Neuro stable. (+) gabriel for urinary retention on bladder scan. inc TF to goal rate of 40cc/hr. family leaning toward pursuing trach/PEG. 1/2 amp for FS 81.   10/11: BD 11. GEORGE overnight. Neuro stable. Trach/PEG consults placed.   10/12: BD 12. GEORGE overnight. Neuro stable. MRI brain complete.   10/13: BD 13. Increase flomax. Hold SQH after PM dose for trach tm. IVL.   10/14: BD 14. GEORGE overnight, remains on AC/VC. Gabriel placed for urinary retention. Dc'd free water.  S/p trach with pulm. NGT placed and CXR confirmed in good position.   10/15: BD 15, GEORGE ovn. resumed feeds. spiked 101, pan cx sent.   10/16: BD 16. GEORGE ovn. Lokelma 5mg for K+ 5.4. Started vanc q 24/zosyn for empiric PNA coverage, IVF to 100/hr. PEG held for fever.   10/17: BD 17,  ordered serum osm and urine osm for am. Started sinemet for neurostimulation. Increased cardura to 0.8. Started FW 100q4, dc'd IVF. MRSA negative, dc'd vanc. NGT replaced d/t coiling.   10/18: BD 18, GEORGE overnight, neuro stable. Amantadine added for neurostim. zosyn changed to unasyn for acinetobacter baumannii, failed TOV and required SC  10/19: BD 19, GEORGE ovn. cardura 2mg added for retention. labetalol decreased 200q8, hydralazine decreased 25q8. Gabriel replaced.   10/20: BD20, GEORGE overnight. NGT dislodged, replaced. PEG tomorrow w/ gen surg, FW increased to 150q4 and labetalol decreased to 100q8, lokelma given for hyperkalemia.   10/21: BD 21. POD0 PEG placement with Gen surg. decr labetolol to 50q8, incr. cardura to 0.4, started lokelma and phoslo, dc gabriel POD0 PEG placement with Gen surg.  10/22: BD 22. Plan to start TF today via PEG. dc labetalol, Following ophtho recs. Increased apnea settings - found to be in cheyne-moe respiration. CPAP 5/5.  10/23: BD 23. hydralazine d/c'd, trach collar trial today. Rectal tube placed at 6am.  10/24: BD24, o/n lokelma held due to diarrhea. Free water 100q6 resumed. dc'd tamsulosin, amantadine. Incr'd cardura to 8mg qhs. Dc'd FW. Switched jevity to nepro. gabriel placed for high urine output. Started SL atropine for oral secretions. Dc'd free water.  10/25: BD25, o/n decreased suctioning requirements to > q4hrs, GEORGE. Cr improving, cont phoslo, lokelma held at this time. Gabriel placed yest, cont. Tolerating trach collar. Given 500cc plasmalyte bolus for ANIKA. Dc'd sinemet.   10/26: BD26, o/n resumed lokelma 5mg daily and resumed 100cc free water q6hrs. Change in neuro status with new right pupillary dilation with anisocoria (right pupil 6mm fixed and left pupil 3mm briskly reactive). Given 23.4% NaCl bullet, taken for emergent CTH showing mostly resolved pontine hemorrhage, continued brainstem hypodensity likely edema d/t hemorrhage, no new hemorrhage or infarct, no herniation, mild increase in size of left lateral ventricle. Vitals remaine stable. Na goal > 140.   10/27: BD27, o/n GEORGE.Neuro stable. Pend stepdown with airway bed.   10/28: BD 28. GEORGE overnight. Neuro stable. Miralax ordered. Gabriel removed, pending TOV.  10/29: BD 29. GEORGE o/n. Given 2L NS over 8 hrs for increased BUN/Cr ratio. Gabriel placed for frequent straight cath.   10/30: BD 30.   10/31: BD 31. GEORGE overnight. Na 149, increased free water to 200q6. 1L NS for uptrending BUN.   11/1: BD 32. GEOGRE overnight. Given 1L NS for dehydration. Na 146, increased FW to 250q6.   11/2: BD 33 GEORGE overnight, neuro stable, given 500cc bolus for net negative status and tachycardia   11/3: BD 34, GEORGE overnight, neuro stable. Patient remains tachycardic, EKG showing sinus tachycardia, given additional 500cc NS bolus. Febrile to 101.9F, pan cultured (without UA), CXR WNL, given tylenol.   11/4: BD 35, GEORGE overnight, neuro stable. Given 1L NS for tachycardia. sputum (+) for stenotropohomas maltophilia.   11/5: BD 36 GEORGE overnight, neuro stable. Vancomycin dc'd. Chest PT BID. ID consulted, cont zosyn.  11/6: BD 37. blood cx + klebsiella dc zosyn changed to cefepime, CTAP ordered, rpt blood cx sent.    11/7: BD 38. Pending CT A/P, given 250cc bolus and starting maintenance fluids overnight. Pending CT A/P after bolus   11/8: BD 39. CT CAP negative for infection.   11/9: BD 40. GEORGE overnight.  11/10: BD 41. GEORGE overnight. desat to 85 on trach collar, O2 inc to 10L and 100%, O2 sat inc to 95. pt tachy to 110s, euvolemic. given tylenol. ABG and CXR ordered. spiked fever, pancultured, RVP negative. AM ABG w pO2 79, rpt w pO2 79. pt appears comfortable, satting 94%.   11/11: BD 42. GEORGE overnight. pt became tachy to 130s, desat to 90 on 100% FiO2 and 10L. suctioned, (+) productive cough. temp 101.4, given 1g IV tylenol and 500cc NS bolus for euvolemia. fever and HR downtrending. LE dopplers negative for dvt  11/12: BD 43, GEORGE ovn, fever and HR downtrending, satting 97% 70% FIO2  11/13: BD 44, GEORGE ovn. started standing tylenol x24 hours for tachycardia. desat to 80s, o2 increased. CXR stable, pending CTA PE protocol.   11/14: BD 45, GEORGE overnight, neuro stable. resp therapy dec FiO2 to 70%.   11/15: BD 46, GEORGE overnight, neuro stable.  Rapid called for desaturation 30s, tachycardic 140s. Patient bagged, 100% fio2, heavily suctioned. CXR/POCUS unremarkable. ABG c/w desaturation. WBC 14.71. Afebrile. O2 improved to 90s and patient upgraded to ICU. ABG paO2 30s improved to 89 on vent. IV Tylenol x 1, sputum sent. Start protonix while o-n vent.   11/16: BD 47. POCUS showed collapsable IVCF, given 1L bolus. Vanco/Cefpime added empriic for PNA, NGT feeds restarted. MRSA swab neg, Vanc DC'd.   11/17: BD 48. GEORGE overnight. 1l bolus for tachycardia. Spiked to 101, cultured. 500cc bolus for tachycardia, tachy to 148 given 25mcg fentanyl, 250cc albumin, 1.5L bolus. 5 IV lopressor with response HR to 100s. +Stenotrophomonas on sputum cx.   11/18: BD 49. GEORGE overnight. Consulted ID, cefepime switched to bactrim x7days. Started hydrochlorothiazine 12.5mg daily.  11/19: BD 50. Tachy 120s, given tylenol and 500cc NS. Tolerating 5/5, switched to TCx. Holding phos binder. D/c Bactrim. D/c gabriel, f/u TOV. Dc'd PPI.   11/20: BD 51. GEORGE ovn. 1600 satting low 90s, mildly tachy to 110s, afebrile, RR wnl. O2 improved to mid 90s while inc O2 to 100% on TCx. CPAP 5/5 placed back on.  11/21: BD 52, GEORGE ovn, tolerating CPAP 5/5. Switch to trach collar during the day if tolerating well. HCTZ held for Cr bump, straight cath frequence increased to q4  11/22: BD 53, GEORGE ovn. Resumed phoslo. Gabriel placed. Resumed HCTZ.   11/23: BD 54. Holding tylenol in setting of possible fever, will require pan cx if febrile. Cr improved today. Cont CPAP. Bowel regimen held i/s/o diarrhea. FOBT negative.  11/24: BD 55. GEORGE overnight. Neuro stable. HCTZ dc'ed, started lisinopril 5. Lokelma dc'ed for K 3.7.   11/25: BD 56, GEORGE overnight. Neuro stable. dc'd lisinopril 5mg. Gabriel dc'd. TOV. 1545 noted to be hypotensive, MAP 50, in supine position on chair, HR 60s, afebrile, O2 96%. Given 1L cc NS bolus, placed back on bed in reverse trendelenberg, improved to map of 66. Neostick at bedside. Vitals check q1h. Dc'ed amlodipine. Failed TOV, bladder scan q6, sc prn. Added back Senna.   11/26: BD 57, GEORGE overnight. Neuro stable. Dc'd phoslo.   11/27: BD 58, GEORGE overnight. Neuro stable.    11/28: BD 59. Gabriel replaced.   11/29: GEORGE.  11/30: GEORGE, neuro stable.   12/1: BD 62, GEORGE overnight  12/2: BD 63, GEORGE overnight.; Given 1L bolus of LR for uptrending BUN/Cr.  12/3: BD 64. Reinstated eye gtt/moisture chamber given increased Rt eye injection  12/4: BD 65. GEORGE overnight. Attempted to speak with ophtho regarding eyelid weight/closure but no answer, full mailbox.   12/5: BD 66, GEORGE overnight, bowel regimen increased and had BM.   12/6: BD 67, GEORGE overnight, neuro stable.  12/7: GEORGE overnight, neuro stable. /110s, given x1 hydralazine 10 mg IVP. Restarted home amlodipine 5mg.  12/8: GEORGE. OOB to chair.     12/11: GEORGE, mucomyst added for thick secretions, simethicone for abd distension, abd xray with stool burden, increased bowel regimen.   12/12: GEORGE overnight.   12/13: GEORGE overnight.   12/14: GEORGE overnight  12/15: o/n Patient became tachycardic HR 120s and 10 minutes later O2 sat dropped as low as 89%. Patient suctioned without improvement in O2 sat and tube feeds found in suction catheter. TFs held.  STAT CXR ordered. STAT labs sent. Respiratory therapist called to bedside and patient trach connected to ventilator. After connection to ventilator and further suctioning O2 sat improved to 97% but patient HR remains 120-130s. Upgraded to NSICU for further management.            MEDICATIONS  (STANDING):  acetylcysteine 10%  Inhalation 4 milliLiter(s) Inhalation every 6 hours  albuterol/ipratropium for Nebulization 3 milliLiter(s) Nebulizer every 6 hours  amLODIPine   Tablet 5 milliGRAM(s) Oral daily  artificial tears (preservative free) Ophthalmic Solution 1 Drop(s) Right EYE every 4 hours  chlorhexidine 0.12% Liquid 15 milliLiter(s) Oral Mucosa every 12 hours  chlorhexidine 2% Cloths 1 Application(s) Topical <User Schedule>  doxazosin 8 milliGRAM(s) Oral at bedtime  erythromycin   Ointment 1 Application(s) Right EYE at bedtime  heparin   Injectable 5000 Unit(s) SubCutaneous every 8 hours  ofloxacin 0.3% Solution 1 Drop(s) Right EYE four times a day  pantoprazole  Injectable 40 milliGRAM(s) IV Push daily  petrolatum Ophthalmic Ointment 1 Application(s) Both EYES two times a day  piperacillin/tazobactam IVPB. 3.375 Gram(s) IV Intermittent once  piperacillin/tazobactam IVPB.- 3.375 Gram(s) IV Intermittent once  piperacillin/tazobactam IVPB.. 3.375 Gram(s) IV Intermittent every 8 hours  polyethylene glycol 3350 17 Gram(s) Oral every 12 hours  senna 2 Tablet(s) Oral every 12 hours  simethicone 80 milliGRAM(s) Chew three times a day  vancomycin  IVPB 1250 milliGRAM(s) IV Intermittent every 12 hours    MEDICATIONS  (PRN):  acetaminophen   Oral Liquid .. 650 milliGRAM(s) Oral every 6 hours PRN Temp greater or equal to 38.5C (101.3F), Mild Pain (1 - 3)    Examination   ICU Vital Signs Last 24 Hrs  T(C): 37.6 (14 Dec 2024 22:14), Max: 37.8 (14 Dec 2024 05:06)  T(F): 99.6 (14 Dec 2024 22:14), Max: 100.1 (14 Dec 2024 05:06)  HR: 84 (14 Dec 2024 20:20) (76 - 92)  BP: 144/99 (14 Dec 2024 20:20) (109/78 - 144/99)  BP(mean): 116 (14 Dec 2024 20:20) (89 - 116)  RR: 18 (14 Dec 2024 20:20) (16 - 18)  SpO2: 100% (14 Dec 2024 20:20) (92% - 100%)  O2 Parameters below as of 14 Dec 2024 20:20  Patient On (Oxygen Delivery Method): tracheostomy collar  O2 Flow (L/min): 10  O2 Concentration (%): 40  Gen: awake, on track collar O2 via mask, appears in distress  CV: tachycardic 120s  Lungs: reduced inspiratory sounds bilaterally, no wheezing  Abdomen - ND/NT, BS+  Extremities: peripheral pulses present/symmetric  Neurological exam   Mental status: awake, briefly attend, does not fully track (extraocular mov impaired), not consistently following commands  CNs:       Relevant labs and imaging reviewed

## 2024-12-15 NOTE — CHART NOTE - NSCHARTNOTEFT_GEN_A_CORE
Patient became tachycardic HR 120s and 10 minutes later O2 sat dropped as low as 89%. Patient suctioned without improvement in O2 sat and tube feeds found in suction catheter. TFs held.  STAT CXR ordered. STAT labs sent. Respiratory therapist called to bedside and patient trach connected to ventilator. After connection to ventilator and further suctioning O2 sat improved to 97% but patient HR remains 120-130s. Patient became tachycardic HR 120s and 10 minutes later O2 sat dropped as low as 89%. Patient suctioned without improvement in O2 sat and tube feeds found in suction catheter. TFs held.  STAT CXR ordered. STAT labs sent. Respiratory therapist called to bedside and patient trach connected to ventilator. After connection to ventilator and further suctioning O2 sat improved to 97% but patient HR remains 120-130s. Upgraded to NSICU for further management. Patient became tachycardic HR 120s and 10 minutes later O2 sat dropped as low as 89%. Patient suctioned without improvement in O2 sat and tube feeds found in suction catheter. TFs held.  STAT CXR completed. STAT labs sent. Respiratory therapist called to bedside and patient trach connected to ventilator. After connection to ventilator and further suctioning O2 sat improved to 97% but patient HR remains 120-130s. Upgraded to NSICU for further management. Vancomycin and zosyn started. CTA  chest PE protocol and CTH ordered. Son updated via telephone.

## 2024-12-15 NOTE — PROGRESS NOTE ADULT - ASSESSMENT
ASSESSMENT AND PLAN  43 y/o PMH ?stroke/MI present to [facility] after collapsing at work. Decorticate posturing, vomiting, intubated for airway protection. 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 Transferred to [facility] for further management. s/p trach 10/14. s/p peg 10/21. Re-upgrade to ICU 2/2 desaturation event and suctioning requirements 11/15. Re-upgrade again on 12/15 for desaturation event.      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 hypovolemia?/hypoxia/respiratory distress?)  - -160  - holding amlodipine 5mg, hold lisinopril 5mg   - echo (9/30) EF 75%  - CTA chest PE study tonight to rule-out PE  - troponin-HS  - telemetry monitoring     Resp: acute hypoxic respiratory failure requiring mechanical ventilation ---> trach to vent  ABG - ( 15 Dec 2024 00:16 )  pH, Arterial: 7.46  pH, Blood: x     /  pCO2: 39    /  pO2: 49    / HCO3: 28    / Base Excess: 3.7   /  SaO2: 88.4      - Trach collar ---> vent   - full vent support/lung protective ventilation   - chest PT/duonebs, aggressive pulmonary toilette   - chest x-ray reviewed ----> left side basilar atelectasis? consolidation?      - CTA chest PA study     GI - episode of vomiting with aspiration   - TF via PEG (placed 10/21 by gen surg) ---> holding feeding given vomiting  - bowel regimen, last BM 12/14  - PPI while intubated     Renal:  ANIKA on CKD ---> improving; retention   12-15    139  |  102  |  42[H]  ----------------------------<  135[H]  3.7   |  26  |  1.20    Ca    8.7      15 Dec 2024 00:01  Phos  3.1     12-15  Mg     2.0     12-15    - IVL; FW flushes 250cc q6hrs   - Urinary retenion: Vuong replaced 11/28  - CKD: trend BUN/Cr  - renal US 10/1: echogenicity c/w chronic med renal dz, repeat 10/8: inc renal echogenicity c/f medical renal disease w increased hydro     Endo:  - A1c 5.4  - avoid hypoglycemia     Heme:                        11.3   10.83 )-----------( 184      ( 15 Dec 2024 00:01 )             35.2     - DVT ppx: SCDs, SQH 5000u q8h   - LE dopplers negative 11/11  - CTA chest PA study     ID - tachycardic/hypoxic, aspiration event, c/w aspiration PNA + leukocytosis (10.83)  - start Vanc and Zosyn ----> send sputum/blood cultures   - s/p Stenotrophomonas maltophilia PNA: s/p Bactrim (11/18-11/19) s/p Cefepime (11/16-11/18)  - 11/3, (+) sputum for stenotrophomas maltophlia, blood cx (+) klebsiella, cefepime 2gq12 (11/6 - 11/12)   - empiric tx: s/p zosyn (11/3-11/6) , s/p vanc  (11/3-11/5)  - S/p Ancef (10/4-10/14) for PNA, and s/p Unasyn (10/18-10/23) +actinobacter baumanii     MISC:  - Ophthalmology consulted for keratitis  - Erythromycin ointment to rt eye q4hrs, ofloxacin ointment to rt eye QID, artificial tears to rt eye q2hrs, moisture chamber at bedtime    Dispo: Upgraded to NeuroICU  ----> overall pending placement and guardianship hearing     Jazlyn Tomlin MD,   NeuroICU Attending    ASSESSMENT AND PLAN  45 y/o PMH ?stroke/MI present to [facility] after collapsing at work. Decorticate posturing, vomiting, intubated for airway protection. 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 Transferred to [facility] for further management. s/p trach 10/14. s/p peg 10/21. Re-upgrade to ICU 2/2 desaturation event and suctioning requirements 11/15. Re-upgrade again on 12/15 for desaturation event.      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 hypovolemia?/hypoxia/respiratory distress?)  - -160  - restart amlodipine 5 mg in the morning  - echo (9/30) EF 75%  - CTA chest PE study negative for PE  - troponin-HS trending   - telemetry monitoring     Resp: acute hypoxic respiratory failure requiring mechanical ventilation ---> trach to vent  ABG - ( 15 Dec 2024 00:16 )  pH, Arterial: 7.46  pH, Blood: x     /  pCO2: 39    /  pO2: 49    / HCO3: 28    / Base Excess: 3.7   /  SaO2: 88.4    ABG - ( 15 Dec 2024 12:53 )  pH, Arterial: 7.40  pH, Blood: x     /  pCO2: 43    /  pO2: 137   / HCO3: 27    / Base Excess: 1.5   /  SaO2: 99.5      - Trach collar ---> vent weaning FiO2 and PEEP   - start PS trial in the morning as tolerated   - full vent support/lung protective ventilation   - chest PT q4/duonebs, aggressive pulmonary toilette        GI - episode of vomiting with aspiration   - TF via PEG (placed 10/21 by gen surg) --->restart trickle feeding   - bowel regimen, last BM 12/14  - PPI while intubated     Renal: AINKA on CKD ---> improving; retention   12-15    140  |  103  |  41[H]  ----------------------------<  119[H]  3.9   |  24  |  1.19    Ca    8.5      15 Dec 2024 05:23  Phos  4.4     12-15  Mg     2.0     12-15    - IVF 50cc/hour ---> start trickle feeding and KVO when advancing feeding   - Urinary retention Vuogn replaced 12/15  - CKD: trend BUN/Cr  - renal US 10/1: echogenicity c/w chronic med renal dz, repeat 10/8: inc renal echogenicity c/f medical renal disease w increased hydro     Endo:   - FS q6 while NPO   - A1c 5.4  - avoid hypoglycemia     Heme:                        11.3   13.98 )-----------( 183      ( 15 Dec 2024 05:23 )             35.8     - DVT ppx: SCDs, SQH 5000u q8h   - LE dopplers negative 11/11  - CTA chest PA study     ID - tachycardic/hypoxic, aspiration event, c/w aspiration PNA + leukocytosis (10.83 ---> 13/98)  - start Vanc and Zosyn ----> follow-up cultures results  - Vanc trough  ---> 4PM on 12/16   - Blood culture 12/15: NGTD  - Sputum culture: gram + cocci and gram - rods - pending speciation   - s/p Stenotrophomonas maltophilia PNA: s/p Bactrim (11/18-11/19) s/p Cefepime (11/16-11/18)  - 11/3, (+) sputum for stenotrophomas maltophlia, blood cx (+) klebsiella, cefepime 2gq12 (11/6 - 11/12)   - empiric tx: s/p zosyn (11/3-11/6) , s/p vanc  (11/3-11/5)  - S/p Ancef (10/4-10/14) for PNA, and s/p Unasyn (10/18-10/23) +actinobacter baumanii     MISC:  - Ophthalmology consulted for keratitis  - Erythromycin ointment to rt eye q4hrs, ofloxacin ointment to rt eye QID, artificial tears to rt eye q2hrs, moisture chamber at bedtime    Dispo: Upgraded to NeuroICU  ----> overall pending placement and guardianship hearing     Jazlyn Tomlin MD,   NeuroICU Attending    ASSESSMENT AND PLAN  45 y/o PMH ?stroke/MI present to [facility] after collapsing at work. Decorticate posturing, vomiting, intubated for airway protection. 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 Transferred to [facility] for further management. s/p trach 10/14. s/p peg 10/21. Re-upgrade to ICU 2/2 desaturation event and suctioning requirements 11/15. Re-upgrade again on 12/15 for desaturation event.      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   - neuro/vitals q4h, protected sleep time 10PM-5AM  - pain control: Tylenol prn  - PT/OT evaluation   - pending rehab     CV - tachycardia in the setting of hypoxia/increased work-of breathing now resolved  troponin-HS 12/15: 51  pro-bnp 12/15: 83  CTA chest PE study 12/15: negative for PE  - -160  - restart amlodipine 5 mg on 12/16 AM  - echo (9/30) EF 75%  - obtain second troponin-HS   - telemetry monitoring     Resp: acute hypoxic respiratory failure requiring mechanical ventilation ---> trach to vent  CTA PE study 12/15 - No PE,  ++ atelectasis   Chest X-ray 12/15 reviewed  ABG - ( 15 Dec 2024 00:16 )  pH, Arterial: 7.46  pH, Blood: x     /  pCO2: 39    /  pO2: 49    / HCO3: 28    / Base Excess: 3.7   /  SaO2: 88.4    ABG - ( 15 Dec 2024 12:53 )  pH, Arterial: 7.40  pH, Blood: x     /  pCO2: 43    /  pO2: 137   / HCO3: 27    / Base Excess: 1.5   /  SaO2: 99.5      - On Full vent support ---> weaning FiO2 and PEEP   - Start PS trial in the morning as tolerated   - Lung protective ventilation   - chest PT q4/duonebs, aggressive pulmonary toilette        GI - episode of vomiting with aspiration   - TF via PEG (placed 10/21 by gen surg) --->restart trickle feeding   - bowel regimen, last BM 12/14  - PPI while intubated     Renal: ANIKA on CKD substantially improving from admission; urinary retention persistent requiring Vuong   12-15    140  |  103  |  41[H]  ----------------------------<  119[H]  3.9   |  24  |  1.19    Ca    8.5      15 Dec 2024 05:23  Phos  4.4     12-15  Mg     2.0     12-15    - IVF reduce to 50cc/hour ---> start trickle feeding and KVO when advancing feeding   - Urinary retention - Vuong replaced 12/15  - CKD: trend BUN/Cr  - renal US 10/1: echogenicity c/w chronic med renal dz, repeat 10/8: inc renal echogenicity c/f medical renal disease w increased hydro     Endo:   - FS q6 while NPO   - A1c 5.4  - avoid hypoglycemia     Heme:                        11.3   13.98 )-----------( 183      ( 15 Dec 2024 05:23 )             35.8     - DVT ppx: SCDs, SQH 5000u q8h   - LE dopplers negative 11/11       ID - tachycardic/hypoxic, aspiration event, c/w aspiration PNA + leukocytosis (10.83 ---> 13.98)  - started Vanc and Zosyn (12/15) continue until cultures results finalized  - Vanc trough  ---> 4PM on 12/16   - Blood culture 12/15: NGTD prelim   - Sputum culture: gram + cocci and gram - rods - pending speciation   - s/p Stenotrophomonas maltophilia PNA: s/p Bactrim (11/18-11/19) s/p Cefepime (11/16-11/18)  - 11/3, (+) sputum for stenotrophomas maltophlia, blood cx (+) klebsiella, cefepime 2gq12 (11/6 - 11/12)   - empiric tx: s/p zosyn (11/3-11/6) , s/p vanc  (11/3-11/5)  - S/p Ancef (10/4-10/14) for PNA, and s/p Unasyn (10/18-10/23) +actinobacter baumanii     MISC:  - Ophthalmology consulted for keratitis  - Erythromycin ointment to rt eye q4hrs, ofloxacin ointment to rt eye QID, artificial tears to rt eye q2hrs, moisture chamber at bedtime    Dispo: NeuroICU  ----> overall pending placement and guardianship hearing     Jazlyn Tomlin MD,   NeuroICU Attending

## 2024-12-15 NOTE — PROVIDER CONTACT NOTE (CRITICAL VALUE NOTIFICATION) - BACKGROUND
Patient with neuro history on tube feeds for nutritional support via PEG, Has been tolerating tube feeds at the goal of 60ml/hr of [employer] Renal Support. Vitals signs WDL throughout shift until sudden increase in heart rate and desaturation. Nurse immediately went to bedside and assessed pt noting use of accessory muscles. RAMONA Anderson from neuro immediately notified and came to bedside to assess.

## 2024-12-15 NOTE — PROGRESS NOTE ADULT - ASSESSMENT
ASSESSMENT AND PLAN  45 y/o PMH ?stroke/MI present to [facility] after collapsing at work. Decorticate posturing, vomiting, intubated for airway protection. 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 Transferred to [facility] for further management. s/p trach 10/14. s/p peg 10/21. Re-upgrade to ICU 2/2 desaturation event and suctioning requirements 11/15. Re-upgrade again on 12/15 for desaturation event.      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 hypovolemia?/hypoxia/respiratory distress?)  - -160  - holding amlodipine 5mg, hold lisinopril 5mg   - echo (9/30) EF 75%  - CTA chest PE study tonight to rule-out PE  - troponin-HS  - telemetry monitoring     Resp: acute hypoxic respiratory failure requiring mechanical ventilation ---> trach to vent  ABG - ( 15 Dec 2024 00:16 )  pH, Arterial: 7.46  pH, Blood: x     /  pCO2: 39    /  pO2: 49    / HCO3: 28    / Base Excess: 3.7   /  SaO2: 88.4      - Trach collar ---> vent   - full vent support/lung protective ventilation   - chest PT/duonebs, aggressive pulmonary toilette   - chest x-ray reviewed ----> left side basilar atelectasis? consolidation?      - CTA chest PA study     GI - episode of vomiting with aspiration   - TF via PEG (placed 10/21 by gen surg) ---> holding feeding given vomiting  - bowel regimen, last BM 12/14  - PPI while intubated     Renal:  ANIKA on CKD ---> improving; retention   12-15    139  |  102  |  42[H]  ----------------------------<  135[H]  3.7   |  26  |  1.20    Ca    8.7      15 Dec 2024 00:01  Phos  3.1     12-15  Mg     2.0     12-15    - IVL; FW flushes 250cc q6hrs   - Urinary retenion: Vuong replaced 11/28  - CKD: trend BUN/Cr  - renal US 10/1: echogenicity c/w chronic med renal dz, repeat 10/8: inc renal echogenicity c/f medical renal disease w increased hydro     Endo:  - A1c 5.4  - avoid hypoglycemia     Heme:                        11.3   10.83 )-----------( 184      ( 15 Dec 2024 00:01 )             35.2     - DVT ppx: SCDs, SQH 5000u q8h   - LE dopplers negative 11/11  - CTA chest PA study     ID - tachycardic/hypoxic, aspiration event, c/w aspiration PNA + leukocytosis (10.83)  - start Vanc and Zosyn ----> send sputum/blood cultures   - s/p Stenotrophomonas maltophilia PNA: s/p Bactrim (11/18-11/19) s/p Cefepime (11/16-11/18)  - 11/3, (+) sputum for stenotrophomas maltophlia, blood cx (+) klebsiella, cefepime 2gq12 (11/6 - 11/12)   - empiric tx: s/p zosyn (11/3-11/6) , s/p vanc  (11/3-11/5)  - S/p Ancef (10/4-10/14) for PNA, and s/p Unasyn (10/18-10/23) +actinobacter baumanii     MISC:  - Ophthalmology consulted for keratitis  - Erythromycin ointment to rt eye q4hrs, ofloxacin ointment to rt eye QID, artificial tears to rt eye q2hrs, moisture chamber at bedtime    Dispo: Upgraded to NeuroICU  ----> overall pending placement and guardianship hearing     Jazlyn Tomlin MD,   NeuroICU Attending

## 2024-12-15 NOTE — PROVIDER CONTACT NOTE (CHANGE IN STATUS NOTIFICATION) - SITUATION
Patient became extremely tachycardic in the 120-130s. RAMONA Anderson from Team Neuro notified about change in status immediately. Pt on tube feeds with the rate at 60. After assessing pt, chest x-ray ordered to rule out aspiration of feeds.

## 2024-12-15 NOTE — PROVIDER CONTACT NOTE (CRITICAL VALUE NOTIFICATION) - SITUATION
normal appearance , without tenderness upon palpation , no deformities , trachea midline 
ABG labs sent after patient was found tachycardic and using accessory muscles. Suspected that patient aspirated on tube feeds. Labs were drawn and sent. JAYLON Fernandez called at 11:45 to give results that Pa)2 was 49. A that point, patient was already on vent with respiratory and team Neuro at bedside awaiting transfer to ICU

## 2024-12-15 NOTE — PROGRESS NOTE ADULT - SUBJECTIVE AND OBJECTIVE BOX
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 s/p peg 10/21. Prolonged hospital stay inability to transfer to Rehab due to lack of insurance.   On 12/15 AM while in Stepdown unit patient became tachycardic HR 120s and 10 minutes later O2 sat dropped as low as 89%. Patient suctioned without improvement in O2 sat and tube feeds found in suction catheter. TFs held.  STAT CXR ordered. STAT labs sent. Respiratory therapist called to bedside and patient trach connected to ventilator. After connection to ventilator and further suctioning O2 sat improved to 97% but patient HR remains 120-130s. Upgraded to NSICU for further management.    HOSPITAL COURSE:   9/30: Transferred from [facility]. A line placed. Versed dc'd. Cy Rader at bedside, states pt has family in [place], cannot confirm medications or PMH other than stroke and MI. 250cc bolus 3% given. LR switched to NS. hydralazine 25q8 started, 3% started, switched propofol to precedex   10/1: stability CTH done. Added labetalol, started TF. Palliative consulted. ethics consulted to determine surrogate. febrile 103, pan cx sent  10/2: BD 2, GEORGE overnight. TF resumed. Desatt'd to 80s, FiO2 inc. to 50. Fentanyl given, ABG, CXR ordered. Maxxed on precedex, started on propofol for DARIEN -4 - -5. Precedex dc'd. Duonebs, mucomyst, hypertonic added. 3% dc'd. Cardene dc'd. Start vanc/CTX. Increased labetalol 200q8. MRSA negative, dc'd vanc. ETT pulled back 2cm x 2, good positioning after confirmatory chest xray. Ethics attempting to establish HCP with family. Na 159, starting FW 250q6 for range 150-155.   10/3: BD3, GEORGE o/n, neuro stable. Na elevating, FW increased to 300q6. Dc'd bowel reg for diarrhea. vEEG started. SQH 5000q8 tonight.   10/4: BD 4, albumn bolus, incr. LR to 80 2/2 incr. in Cr, LR to 100cc/hr for uptrending Cr. Started 7% hypersal for 48hrs and SL atropine for inline/oral thick secretions. Dc'd CTX and started ancef for MSSA in the sputum. Nephrology consulted for CKD, f/u recs. SBP 170s, given hydralazine 10mg IVP.   10/5: BD5, o/n 10mg IVP hydralazine given for SBP 170s and started on hydralazine 25q8 via OGT. 10mg IV push labetalol for SBP > 160s. RT placed for diarrhea.   10/6: BD6, o/n FW increased to 350q4 per nephrology recs. IV tylenol for temp 100.6, SBp 160s presumed uncomfortable.   10/7: BD7, overnight pancultured for temp 101.8F.   10/8: BD8. GEORGE. Cr bumped. decreased LR to 75cc/hr. Adding simethicone ATC. incr hydralazine 50mgTID. Incr labetalol 300mgTID. Na 145, decreased FWF to 250q6. Start precedex. FENa consistent with intrinsic kidney injury. Pend repeat renal US. Retaining up to 1.3L, bladder scans q6, straight cath PRN  10/9: BD 9. GEORGE overnight. Neuro stable. abd xray for distention w non-specific gas pattern, OGT to LIWS for morning. duonebs/mucomyst to q8 for improving secretions. Changed tube feeds to Jevity 1.5 20cc/hr, low rate due to abdominal distention, nepro dense and more difficult to digest. Tolerating CPAP, confirmed by ABG.   10/10: BD 10. GEORGE overnight. Neuro stable. (+) gabriel for urinary retention on bladder scan. inc TF to goal rate of 40cc/hr. family leaning toward pursuing trach/PEG. 1/2 amp for FS 81.   10/11: BD 11. GEORGE overnight. Neuro stable. Trach/PEG consults placed.   10/12: BD 12. GEORGE overnight. Neuro stable. MRI brain complete.   10/13: BD 13. Increase flomax. Hold SQH after PM dose for trach tm. IVL.   10/14: BD 14. GEORGE overnight, remains on AC/VC. Gabriel placed for urinary retention. Dc'd free water.  S/p trach with pulm. NGT placed and CXR confirmed in good position.   10/15: BD 15, GEORGE ovn. resumed feeds. spiked 101, pan cx sent.   10/16: BD 16. GEORGE ovn. Lokelma 5mg for K+ 5.4. Started vanc q 24/zosyn for empiric PNA coverage, IVF to 100/hr. PEG held for fever.   10/17: BD 17,  ordered serum osm and urine osm for am. Started sinemet for neurostimulation. Increased cardura to 0.8. Started FW 100q4, dc'd IVF. MRSA negative, dc'd vanc. NGT replaced d/t coiling.   10/18: BD 18, GEORGE overnight, neuro stable. Amantadine added for neurostim. zosyn changed to unasyn for acinetobacter baumannii, failed TOV and required SC  10/19: BD 19, GEORGE ovn. cardura 2mg added for retention. labetalol decreased 200q8, hydralazine decreased 25q8. Gabriel replaced.   10/20: BD20, GEORGE overnight. NGT dislodged, replaced. PEG tomorrow w/ gen surg, FW increased to 150q4 and labetalol decreased to 100q8, lokelma given for hyperkalemia.   10/21: BD 21. POD0 PEG placement with Gen surg. decr labetolol to 50q8, incr. cardura to 0.4, started lokelma and phoslo, dc gabriel POD0 PEG placement with Gen surg.  10/22: BD 22. Plan to start TF today via PEG. dc labetalol, Following ophtho recs. Increased apnea settings - found to be in cheyne-moe respiration. CPAP 5/5.  10/23: BD 23. hydralazine d/c'd, trach collar trial today. Rectal tube placed at 6am.  10/24: BD24, o/n lokelma held due to diarrhea. Free water 100q6 resumed. dc'd tamsulosin, amantadine. Incr'd cardura to 8mg qhs. Dc'd FW. Switched jevity to nepro. gabriel placed for high urine output. Started SL atropine for oral secretions. Dc'd free water.  10/25: BD25, o/n decreased suctioning requirements to > q4hrs, GEORGE. Cr improving, cont phoslo, lokelma held at this time. Gabriel placed yest, cont. Tolerating trach collar. Given 500cc plasmalyte bolus for ANIKA. Dc'd sinemet.   10/26: BD26, o/n resumed lokelma 5mg daily and resumed 100cc free water q6hrs. Change in neuro status with new right pupillary dilation with anisocoria (right pupil 6mm fixed and left pupil 3mm briskly reactive). Given 23.4% NaCl bullet, taken for emergent CTH showing mostly resolved pontine hemorrhage, continued brainstem hypodensity likely edema d/t hemorrhage, no new hemorrhage or infarct, no herniation, mild increase in size of left lateral ventricle. Vitals remaine stable. Na goal > 140.   10/27: BD27, o/n GEORGE.Neuro stable. Pend stepdown with airway bed.   11/1: BD 32. GEORGE overnight. Given 1L NS for dehydration. Na 146, increased FW to 250q6.   11/2: BD 33 GEORGE overnight, neuro stable, given 500cc bolus for net negative status and tachycardia   11/3: BD 34, GEORGE overnight, neuro stable. Patient remains tachycardic, EKG showing sinus tachycardia, given additional 500cc NS bolus. Febrile to 101.9F, pan cultured (without UA), CXR WNL, given tylenol.   11/4: BD 35, GEORGE overnight, neuro stable. Given 1L NS for tachycardia. sputum (+) for stenotropohomas maltophilia.   11/5: BD 36 GEORGE overnight, neuro stable. Vancomycin dc'd. Chest PT BID. ID consulted, cont zosyn.  11/6: BD 37. blood cx + klebsiella dc zosyn changed to cefepime, CTAP ordered, rpt blood cx sent.    11/7: BD 38. Pending CT A/P, given 250cc bolus and starting maintenance fluids overnight. Pending CT A/P after bolus   11/8: BD 39. CT CAP negative for infection.   11/10: BD 41. GEORGE overnight. desat to 85 on trach collar, O2 inc to 10L and 100%, O2 sat inc to 95. pt tachy to 110s, euvolemic. given tylenol. ABG and CXR ordered. spiked fever, pancultured, RVP negative. AM ABG w pO2 79, rpt w pO2 79. pt appears comfortable, satting 94%.   11/11: BD 42. GEORGE overnight. pt became tachy to 130s, desat to 90 on 100% FiO2 and 10L. suctioned, (+) productive cough. temp 101.4, given 1g IV tylenol and 500cc NS bolus for euvolemia. fever and HR downtrending. LE dopplers negative for dvt  11/12: BD 43, GEORGE ovn, fever and HR downtrending, satting 97% 70% FIO2  11/13: BD 44, GEORGE ovn. started standing tylenol x24 hours for tachycardia. desat to 80s, o2 increased. CXR stable, pending CTA PE protocol.   11/15: BD 46, GEORGE overnight, neuro stable.  Rapid called for desaturation 30s, tachycardic 140s. Patient bagged, 100% fio2, heavily suctioned. CXR/POCUS unremarkable. ABG c/w desaturation. WBC 14.71. Afebrile. O2 improved to 90s and patient upgraded to ICU. ABG paO2 30s improved to 89 on vent. IV Tylenol x 1, sputum sent. Start protonix while o-n vent.   11/16: BD 47. POCUS showed collapsable IVCF, given 1L bolus. Vanco/Cefpime added empriic for PNA, NGT feeds restarted. MRSA swab neg, Vanc DC'd.   11/17: BD 48. GEORGE overnight. 1l bolus for tachycardia. Spiked to 101, cultured. 500cc bolus for tachycardia, tachy to 148 given 25mcg fentanyl, 250cc albumin, 1.5L bolus. 5 IV lopressor with response HR to 100s. +Stenotrophomonas on sputum cx.   11/18: BD 49. GEORGE overnight. Consulted ID, cefepime switched to bactrim x7days. Started hydrochlorothiazine 12.5mg daily.  11/19: BD 50. Tachy 120s, given tylenol and 500cc NS. Tolerating 5/5, switched to TCx. Holding phos binder. D/c Bactrim. D/c gabriel, f/u TOV. Dc'd PPI.   11/20: BD 51. GEORGE ovn. 1600 satting low 90s, mildly tachy to 110s, afebrile, RR wnl. O2 improved to mid 90s while inc O2 to 100% on TCx. CPAP 5/5 placed back on.  11/21: BD 52, GEORGE ovn, tolerating CPAP 5/5. Switch to trach collar during the day if tolerating well. HCTZ held for Cr bump, straight cath frequence increased to q4  11/22: BD 53, GEORGE ovn. Resumed phoslo. Gabriel placed. Resumed HCTZ.   11/23: BD 54. Holding tylenol in setting of possible fever, will require pan cx if febrile. Cr improved today. Cont CPAP. Bowel regimen held i/s/o diarrhea. FOBT negative.  11/24: BD 55. GEORGE overnight. Neuro stable. HCTZ dc'ed, started lisinopril 5. Lokelma dc'ed for K 3.7.   11/25: BD 56, GEORGE overnight. Neuro stable. dc'd lisinopril 5mg. Gabriel dc'd. TOV. 1545 noted to be hypotensive, MAP 50, in supine position on chair, HR 60s, afebrile, O2 96%. Given 1L cc NS bolus, placed back on bed in reverse trendelenberg, improved to map of 66. Neostick at bedside. Vitals check q1h. Dc'ed amlodipine. Failed TOV, bladder scan q6, sc prn. Added back Senna.   11/28: BD 59. Gabriel replaced.   12/2: BD 63, GEORGE overnight.; Given 1L bolus of LR for uptrending BUN/Cr.  12/3: BD 64. Reinstated eye gtt/moisture chamber given increased Rt eye injection  12/7: GEORGE overnight, neuro stable. /110s, given x1 hydralazine 10 mg IVP. Restarted home amlodipine 5mg.  12/11: GEORGE, mucomyst added for thick secretions, simethicone for abd distension, abd xray with stool burden, increased bowel regimen.   12/15: o/n Patient became tachycardic HR 120s and 10 minutes later O2 sat dropped as low as 89%. Patient suctioned without improvement in O2 sat and tube feeds found in suction catheter. TFs held.  STAT CXR ordered. STAT labs sent. Respiratory therapist called to bedside and patient trach connected to ventilator. After connection to ventilator and further suctioning O2 sat improved to 97% but patient HR remains 120-130s. Upgraded to NSICU for further management.            MEDICATIONS  (STANDING):  acetylcysteine 10%  Inhalation 4 milliLiter(s) Inhalation every 6 hours  albuterol/ipratropium for Nebulization 3 milliLiter(s) Nebulizer every 6 hours  amLODIPine   Tablet 5 milliGRAM(s) Oral daily  artificial tears (preservative free) Ophthalmic Solution 1 Drop(s) Right EYE every 4 hours  chlorhexidine 0.12% Liquid 15 milliLiter(s) Oral Mucosa every 12 hours  chlorhexidine 2% Cloths 1 Application(s) Topical <User Schedule>  doxazosin 8 milliGRAM(s) Oral at bedtime  erythromycin   Ointment 1 Application(s) Right EYE at bedtime  heparin   Injectable 5000 Unit(s) SubCutaneous every 8 hours  ofloxacin 0.3% Solution 1 Drop(s) Right EYE four times a day  pantoprazole  Injectable 40 milliGRAM(s) IV Push daily  petrolatum Ophthalmic Ointment 1 Application(s) Both EYES two times a day  piperacillin/tazobactam IVPB. 3.375 Gram(s) IV Intermittent once  piperacillin/tazobactam IVPB.- 3.375 Gram(s) IV Intermittent once  piperacillin/tazobactam IVPB.. 3.375 Gram(s) IV Intermittent every 8 hours  polyethylene glycol 3350 17 Gram(s) Oral every 12 hours  senna 2 Tablet(s) Oral every 12 hours  simethicone 80 milliGRAM(s) Chew three times a day  vancomycin  IVPB 1250 milliGRAM(s) IV Intermittent every 12 hours    MEDICATIONS  (PRN):  acetaminophen   Oral Liquid .. 650 milliGRAM(s) Oral every 6 hours PRN Temp greater or equal to 38.5C (101.3F), Mild Pain (1 - 3)    Examination   ICU Vital Signs Last 24 Hrs  T(C): 37.6 (14 Dec 2024 22:14), Max: 37.8 (14 Dec 2024 05:06)  T(F): 99.6 (14 Dec 2024 22:14), Max: 100.1 (14 Dec 2024 05:06)  HR: 84 (14 Dec 2024 20:20) (76 - 92)  BP: 144/99 (14 Dec 2024 20:20) (109/78 - 144/99)  BP(mean): 116 (14 Dec 2024 20:20) (89 - 116)  RR: 18 (14 Dec 2024 20:20) (16 - 18)  SpO2: 100% (14 Dec 2024 20:20) (92% - 100%)  O2 Parameters below as of 14 Dec 2024 20:20  Patient On (Oxygen Delivery Method): tracheostomy collar  O2 Flow (L/min): 10  O2 Concentration (%): 40  Gen: awake, on track collar O2 via mask, appears in distress  CV: tachycardic 120s  Lungs: reduced inspiratory sounds bilaterally, no wheezing  Abdomen - ND/NT, BS+  Extremities: peripheral pulses present/symmetric  Neurological exam   Mental status: awake, briefly attend, does not fully track (extraocular mov impaired), not consistently following commands  CNs:       Relevant labs and imaging reviewed   =========================  NSICU ATTENDING PROGRESS NOTE  =========================    HPI   Patient is a 43 y/o male with PMH ?stroke/MI initially admitted on 9/30 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 s/p peg 10/21. Prolonged hospital stay inability to transfer to Rehab due to lack of insurance.   On 12/15 AM while in Stepdown unit patient became tachycardic HR 120s and 10 minutes later O2 sat dropped as low as 89%. Patient suctioned without improvement in O2 sat and tube feeds found in suction catheter. TFs held.  STAT CXR ordered. STAT labs sent. Respiratory therapist called to bedside and patient trach connected to ventilator. After connection to ventilator and further suctioning O2 sat improved to 97% but patient HR remains 120-130s. Upgraded to NSICU for further management.    HOSPITAL COURSE:   9/30: Transferred from [facility]. A line placed. Versed dc'd. Cy Rader at bedside, states pt has family in [place], cannot confirm medications or PMH other than stroke and MI. 250cc bolus 3% given. LR switched to NS. hydralazine 25q8 started, 3% started, switched propofol to precedex   10/1: stability CTH done. Added labetalol, started TF. Palliative consulted. ethics consulted to determine surrogate. febrile 103, pan cx sent  10/2: BD 2, GEORGE overnight. TF resumed. Desatt'd to 80s, FiO2 inc. to 50. Fentanyl given, ABG, CXR ordered. Maxxed on precedex, started on propofol for DARIEN -4 - -5. Precedex dc'd. Duonebs, mucomyst, hypertonic added. 3% dc'd. Cardene dc'd. Start vanc/CTX. Increased labetalol 200q8. MRSA negative, dc'd vanc. ETT pulled back 2cm x 2, good positioning after confirmatory chest xray. Ethics attempting to establish HCP with family. Na 159, starting FW 250q6 for range 150-155.   10/3: BD3, GEORGE o/n, neuro stable. Na elevating, FW increased to 300q6. Dc'd bowel reg for diarrhea. vEEG started. SQH 5000q8 tonight.   10/4: BD 4, albumn bolus, incr. LR to 80 2/2 incr. in Cr, LR to 100cc/hr for uptrending Cr. Started 7% hypersal for 48hrs and SL atropine for inline/oral thick secretions. Dc'd CTX and started ancef for MSSA in the sputum. Nephrology consulted for CKD, f/u recs. SBP 170s, given hydralazine 10mg IVP.   10/5: BD5, o/n 10mg IVP hydralazine given for SBP 170s and started on hydralazine 25q8 via OGT. 10mg IV push labetalol for SBP > 160s. RT placed for diarrhea.   10/6: BD6, o/n FW increased to 350q4 per nephrology recs. IV tylenol for temp 100.6, SBp 160s presumed uncomfortable.   10/7: BD7, overnight pancultured for temp 101.8F.   10/8: BD8. GEORGE. Cr bumped. decreased LR to 75cc/hr. Adding simethicone ATC. incr hydralazine 50mgTID. Incr labetalol 300mgTID. Na 145, decreased FWF to 250q6. Start precedex. FENa consistent with intrinsic kidney injury. Pend repeat renal US. Retaining up to 1.3L, bladder scans q6, straight cath PRN  10/9: BD 9. GEORGE overnight. Neuro stable. abd xray for distention w non-specific gas pattern, OGT to LIWS for morning. duonebs/mucomyst to q8 for improving secretions. Changed tube feeds to Jevity 1.5 20cc/hr, low rate due to abdominal distention, nepro dense and more difficult to digest. Tolerating CPAP, confirmed by ABG.   10/10: BD 10. GEORGE overnight. Neuro stable. (+) gabriel for urinary retention on bladder scan. inc TF to goal rate of 40cc/hr. family leaning toward pursuing trach/PEG. 1/2 amp for FS 81.   10/11: BD 11. GEORGE overnight. Neuro stable. Trach/PEG consults placed.   10/12: BD 12. GEORGE overnight. Neuro stable. MRI brain complete.   10/13: BD 13. Increase flomax. Hold SQH after PM dose for trach tm. IVL.   10/14: BD 14. GEORGE overnight, remains on AC/VC. Gabriel placed for urinary retention. Dc'd free water.  S/p trach with pulm. NGT placed and CXR confirmed in good position.   10/15: BD 15, GEORGE ovn. resumed feeds. spiked 101, pan cx sent.   10/16: BD 16. GEORGE ovn. Lokelma 5mg for K+ 5.4. Started vanc q 24/zosyn for empiric PNA coverage, IVF to 100/hr. PEG held for fever.   10/17: BD 17,  ordered serum osm and urine osm for am. Started sinemet for neurostimulation. Increased cardura to 0.8. Started FW 100q4, dc'd IVF. MRSA negative, dc'd vanc. NGT replaced d/t coiling.   10/18: BD 18, GEORGE overnight, neuro stable. Amantadine added for neurostim. zosyn changed to unasyn for acinetobacter baumannii, failed TOV and required SC  10/19: BD 19, GEORGE ovn. cardura 2mg added for retention. labetalol decreased 200q8, hydralazine decreased 25q8. Gabriel replaced.   10/20: BD20, GEORGE overnight. NGT dislodged, replaced. PEG tomorrow w/ gen surg, FW increased to 150q4 and labetalol decreased to 100q8, lokelma given for hyperkalemia.   10/21: BD 21. POD0 PEG placement with Gen surg. decr labetolol to 50q8, incr. cardura to 0.4, started lokelma and phoslo, dc gabriel POD0 PEG placement with Gen surg.  10/22: BD 22. Plan to start TF today via PEG. dc labetalol, Following ophtho recs. Increased apnea settings - found to be in cheyne-moe respiration. CPAP 5/5.  10/23: BD 23. hydralazine d/c'd, trach collar trial today. Rectal tube placed at 6am.  10/24: BD24, o/n lokelma held due to diarrhea. Free water 100q6 resumed. dc'd tamsulosin, amantadine. Incr'd cardura to 8mg qhs. Dc'd FW. Switched jevity to nepro. gabriel placed for high urine output. Started SL atropine for oral secretions. Dc'd free water.  10/25: BD25, o/n decreased suctioning requirements to > q4hrs, GEORGE. Cr improving, cont phoslo, lokelma held at this time. Gabriel placed yest, cont. Tolerating trach collar. Given 500cc plasmalyte bolus for ANIKA. Dc'd sinemet.   10/26: BD26, o/n resumed lokelma 5mg daily and resumed 100cc free water q6hrs. Change in neuro status with new right pupillary dilation with anisocoria (right pupil 6mm fixed and left pupil 3mm briskly reactive). Given 23.4% NaCl bullet, taken for emergent CTH showing mostly resolved pontine hemorrhage, continued brainstem hypodensity likely edema d/t hemorrhage, no new hemorrhage or infarct, no herniation, mild increase in size of left lateral ventricle. Vitals remaine stable. Na goal > 140.   10/27: BD27, o/n GEORGE.Neuro stable. Pend stepdown with airway bed.   11/1: BD 32. GEORGE overnight. Given 1L NS for dehydration. Na 146, increased FW to 250q6.   11/2: BD 33 GEORGE overnight, neuro stable, given 500cc bolus for net negative status and tachycardia   11/3: BD 34, GEORGE overnight, neuro stable. Patient remains tachycardic, EKG showing sinus tachycardia, given additional 500cc NS bolus. Febrile to 101.9F, pan cultured (without UA), CXR WNL, given tylenol.   11/4: BD 35, GEORGE overnight, neuro stable. Given 1L NS for tachycardia. sputum (+) for stenotropohomas maltophilia.   11/5: BD 36 GEORGE overnight, neuro stable. Vancomycin dc'd. Chest PT BID. ID consulted, cont zosyn.  11/6: BD 37. blood cx + klebsiella dc zosyn changed to cefepime, CTAP ordered, rpt blood cx sent.    11/7: BD 38. Pending CT A/P, given 250cc bolus and starting maintenance fluids overnight. Pending CT A/P after bolus   11/8: BD 39. CT CAP negative for infection.   11/10: BD 41. GEORGE overnight. desat to 85 on trach collar, O2 inc to 10L and 100%, O2 sat inc to 95. pt tachy to 110s, euvolemic. given tylenol. ABG and CXR ordered. spiked fever, pancultured, RVP negative. AM ABG w pO2 79, rpt w pO2 79. pt appears comfortable, satting 94%.   11/11: BD 42. GEORGE overnight. pt became tachy to 130s, desat to 90 on 100% FiO2 and 10L. suctioned, (+) productive cough. temp 101.4, given 1g IV tylenol and 500cc NS bolus for euvolemia. fever and HR downtrending. LE dopplers negative for dvt  11/12: BD 43, GEORGE ovn, fever and HR downtrending, satting 97% 70% FIO2  11/13: BD 44, GEORGE ovn. started standing tylenol x24 hours for tachycardia. desat to 80s, o2 increased. CXR stable, pending CTA PE protocol.   11/15: BD 46, GEORGE overnight, neuro stable.  Rapid called for desaturation 30s, tachycardic 140s. Patient bagged, 100% fio2, heavily suctioned. CXR/POCUS unremarkable. ABG c/w desaturation. WBC 14.71. Afebrile. O2 improved to 90s and patient upgraded to ICU. ABG paO2 30s improved to 89 on vent. IV Tylenol x 1, sputum sent. Start protonix while o-n vent.   11/16: BD 47. POCUS showed collapsable IVCF, given 1L bolus. Vanco/Cefpime added empriic for PNA, NGT feeds restarted. MRSA swab neg, Vanc DC'd.   11/17: BD 48. GEORGE overnight. 1l bolus for tachycardia. Spiked to 101, cultured. 500cc bolus for tachycardia, tachy to 148 given 25mcg fentanyl, 250cc albumin, 1.5L bolus. 5 IV lopressor with response HR to 100s. +Stenotrophomonas on sputum cx.   11/18: BD 49. GEORGE overnight. Consulted ID, cefepime switched to bactrim x7days. Started hydrochlorothiazine 12.5mg daily.  11/19: BD 50. Tachy 120s, given tylenol and 500cc NS. Tolerating 5/5, switched to TCx. Holding phos binder. D/c Bactrim. D/c gabriel, f/u TOV. Dc'd PPI.   11/20: BD 51. GEORGE ovn. 1600 satting low 90s, mildly tachy to 110s, afebrile, RR wnl. O2 improved to mid 90s while inc O2 to 100% on TCx. CPAP 5/5 placed back on.  11/21: BD 52, GEORGE ovn, tolerating CPAP 5/5. Switch to trach collar during the day if tolerating well. HCTZ held for Cr bump, straight cath frequence increased to q4  11/22: BD 53, GEORGE ovn. Resumed phoslo. Gabriel placed. Resumed HCTZ.   11/23: BD 54. Holding tylenol in setting of possible fever, will require pan cx if febrile. Cr improved today. Cont CPAP. Bowel regimen held i/s/o diarrhea. FOBT negative.  11/24: BD 55. GEORGE overnight. Neuro stable. HCTZ dc'ed, started lisinopril 5. Lokelma dc'ed for K 3.7.   11/25: BD 56, GEORGE overnight. Neuro stable. dc'd lisinopril 5mg. Gabriel dc'd. TOV. 1545 noted to be hypotensive, MAP 50, in supine position on chair, HR 60s, afebrile, O2 96%. Given 1L cc NS bolus, placed back on bed in reverse trendelenberg, improved to map of 66. Neostick at bedside. Vitals check q1h. Dc'ed amlodipine. Failed TOV, bladder scan q6, sc prn. Added back Senna.   11/28: BD 59. Gabriel replaced.   12/2: BD 63, GEORGE overnight.; Given 1L bolus of LR for uptrending BUN/Cr.  12/3: BD 64. Reinstated eye gtt/moisture chamber given increased Rt eye injection  12/7: GEORGE overnight, neuro stable. /110s, given x1 hydralazine 10 mg IVP. Restarted home amlodipine 5mg.  12/11: GEORGE, mucomyst added for thick secretions, simethicone for abd distension, abd xray with stool burden, increased bowel regimen.   12/15: o/n Patient became tachycardic HR 120s and 10 minutes later O2 sat dropped as low as 89%. Patient suctioned without improvement in O2 sat and tube feeds found in suction catheter. TFs held.  STAT CXR ordered. STAT labs sent. Respiratory therapist called to bedside and patient trach connected to ventilator. After connection to ventilator and further suctioning O2 sat improved to 97% but patient HR remains 120-130s. Upgraded to NSICU for further management.        ICU Vital Signs Last 24 Hrs  T(C): 36.7 (15 Dec 2024 04:12), Max: 37.7 (14 Dec 2024 14:19)  T(F): 98 (15 Dec 2024 04:12), Max: 99.9 (14 Dec 2024 14:19)  HR: 81 (15 Dec 2024 08:00) (76 - 126)  BP: 120/90 (15 Dec 2024 08:00) (120/90 - 156/107)  BP(mean): 101 (15 Dec 2024 08:00) (98 - 125)  RR: 14 (15 Dec 2024 08:00) (14 - 22)  SpO2: 100% (15 Dec 2024 08:00) (92% - 100%)      12-14-24 @ 07:01  -  12-15-24 @ 07:00  --------------------------------------------------------  IN: 1660 mL / OUT: 1390 mL / NET: 270 mL    12-15-24 @ 07:01  -  12-15-24 @ 08:19  --------------------------------------------------------  IN: 0 mL / OUT: 65 mL / NET: -65 mL      Mode: AC/ CMV (Assist Control/ Continuous Mandatory Ventilation), RR (machine): 14, TV (machine): 400, FiO2: 85, PEEP: 5, ITime: 1, MAP: 7.4, PIP: 14      Gen: awake, on track collar O2 via mask, appears in distress  CV: tachycardic 120s  Lungs: reduced inspiratory sounds bilaterally, no wheezing  Abdomen - ND/NT, BS+  Extremities: peripheral pulses present/symmetric  Neurological exam   Mental status: awake, briefly attend, does not fully track (extraocular mov impaired), not consistently following commands  CNs:       MEDICATIONS:   acetaminophen   Oral Liquid .. 650 milliGRAM(s) Oral every 6 hours PRN  acetylcysteine 10%  Inhalation 4 milliLiter(s) Inhalation every 6 hours  albuterol/ipratropium for Nebulization 3 milliLiter(s) Nebulizer every 6 hours  artificial tears (preservative free) Ophthalmic Solution 1 Drop(s) Right EYE every 4 hours  chlorhexidine 0.12% Liquid 15 milliLiter(s) Oral Mucosa every 12 hours  chlorhexidine 2% Cloths 1 Application(s) Topical <User Schedule>  doxazosin 8 milliGRAM(s) Oral at bedtime  erythromycin   Ointment 1 Application(s) Right EYE at bedtime  heparin   Injectable 5000 Unit(s) SubCutaneous every 8 hours  ofloxacin 0.3% Solution 1 Drop(s) Right EYE four times a day  pantoprazole  Injectable 40 milliGRAM(s) IV Push daily  petrolatum Ophthalmic Ointment 1 Application(s) Both EYES two times a day  piperacillin/tazobactam IVPB.. 3.375 Gram(s) IV Intermittent every 8 hours  polyethylene glycol 3350 17 Gram(s) Oral every 12 hours  senna 2 Tablet(s) Oral every 12 hours  simethicone 80 milliGRAM(s) Chew three times a day  vancomycin  IVPB 1250 milliGRAM(s) IV Intermittent every 12 hours      LABS:                        11.3   13.98 )-----------( 183      ( 15 Dec 2024 05:23 )             35.8     12-15    140  |  103  |  41[H]  ----------------------------<  119[H]  3.9   |  24  |  1.19    Ca    8.5      15 Dec 2024 05:23  Phos  4.4     12-15  Mg     2.0     12-15        ABG - ( 15 Dec 2024 02:38 )  pH, Arterial: 7.43  pH, Blood: x     /  pCO2: 39    /  pO2: 243   / HCO3: 26    / Base Excess: 1.5   /  SaO2: 100.0                          =========================  NSICU ATTENDING PROGRESS NOTE  =========================    HPI   Patient is a 43 y/o male with PMH ?stroke/MI initially admitted on 9/30 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 s/p peg 10/21. Prolonged hospital stay inability to transfer to Rehab due to lack of insurance.   On 12/15 AM while in Stepdown unit patient became tachycardic HR 120s and 10 minutes later O2 sat dropped as low as 89%. Patient suctioned without improvement in O2 sat and tube feeds found in suction catheter. TFs held.  STAT CXR ordered. STAT labs sent. Respiratory therapist called to bedside and patient trach connected to ventilator. After connection to ventilator and further suctioning O2 sat improved to 97% but patient HR remains 120-130s. Upgraded to NSICU for further management.    HOSPITAL COURSE:   9/30: Transferred from [facility]. A line placed. Versed dc'd. Cy Rader at bedside, states pt has family in [place], cannot confirm medications or PMH other than stroke and MI. 250cc bolus 3% given. LR switched to NS. hydralazine 25q8 started, 3% started, switched propofol to precedex   10/1: stability CTH done. Added labetalol, started TF. Palliative consulted. ethics consulted to determine surrogate. febrile 103, pan cx sent  10/2: BD 2, GEORGE overnight. TF resumed. Desatt'd to 80s, FiO2 inc. to 50. Fentanyl given, ABG, CXR ordered. Maxxed on precedex, started on propofol for DARIEN -4 - -5. Precedex dc'd. Duonebs, mucomyst, hypertonic added. 3% dc'd. Cardene dc'd. Start vanc/CTX. Increased labetalol 200q8. MRSA negative, dc'd vanc. ETT pulled back 2cm x 2, good positioning after confirmatory chest xray. Ethics attempting to establish HCP with family. Na 159, starting FW 250q6 for range 150-155.   10/3: BD3, GEORGE o/n, neuro stable. Na elevating, FW increased to 300q6. Dc'd bowel reg for diarrhea. vEEG started. SQH 5000q8 tonight.   10/4: BD 4, albumn bolus, incr. LR to 80 2/2 incr. in Cr, LR to 100cc/hr for uptrending Cr. Started 7% hypersal for 48hrs and SL atropine for inline/oral thick secretions. Dc'd CTX and started ancef for MSSA in the sputum. Nephrology consulted for CKD, f/u recs. SBP 170s, given hydralazine 10mg IVP.   10/5: BD5, o/n 10mg IVP hydralazine given for SBP 170s and started on hydralazine 25q8 via OGT. 10mg IV push labetalol for SBP > 160s. RT placed for diarrhea.   10/6: BD6, o/n FW increased to 350q4 per nephrology recs. IV tylenol for temp 100.6, SBp 160s presumed uncomfortable.   10/7: BD7, overnight pancultured for temp 101.8F.   10/8: BD8. GEORGE. Cr bumped. decreased LR to 75cc/hr. Adding simethicone ATC. incr hydralazine 50mgTID. Incr labetalol 300mgTID. Na 145, decreased FWF to 250q6. Start precedex. FENa consistent with intrinsic kidney injury. Pend repeat renal US. Retaining up to 1.3L, bladder scans q6, straight cath PRN  10/9: BD 9. GEORGE overnight. Neuro stable. abd xray for distention w non-specific gas pattern, OGT to LIWS for morning. duonebs/mucomyst to q8 for improving secretions. Changed tube feeds to Jevity 1.5 20cc/hr, low rate due to abdominal distention, nepro dense and more difficult to digest. Tolerating CPAP, confirmed by ABG.   10/10: BD 10. GEORGE overnight. Neuro stable. (+) gabriel for urinary retention on bladder scan. inc TF to goal rate of 40cc/hr. family leaning toward pursuing trach/PEG. 1/2 amp for FS 81.   10/11: BD 11. GEORGE overnight. Neuro stable. Trach/PEG consults placed.   10/12: BD 12. GEORGE overnight. Neuro stable. MRI brain complete.   10/13: BD 13. Increase flomax. Hold SQH after PM dose for trach tm. IVL.   10/14: BD 14. GEORGE overnight, remains on AC/VC. Gabriel placed for urinary retention. Dc'd free water.  S/p trach with pulm. NGT placed and CXR confirmed in good position.   10/15: BD 15, GEORGE ovn. resumed feeds. spiked 101, pan cx sent.   10/16: BD 16. GEORGE ovn. Lokelma 5mg for K+ 5.4. Started vanc q 24/zosyn for empiric PNA coverage, IVF to 100/hr. PEG held for fever.   10/17: BD 17,  ordered serum osm and urine osm for am. Started sinemet for neurostimulation. Increased cardura to 0.8. Started FW 100q4, dc'd IVF. MRSA negative, dc'd vanc. NGT replaced d/t coiling.   10/18: BD 18, GEORGE overnight, neuro stable. Amantadine added for neurostim. zosyn changed to unasyn for acinetobacter baumannii, failed TOV and required SC  10/19: BD 19, GEORGE ovn. cardura 2mg added for retention. labetalol decreased 200q8, hydralazine decreased 25q8. Gabriel replaced.   10/20: BD20, GEORGE overnight. NGT dislodged, replaced. PEG tomorrow w/ gen surg, FW increased to 150q4 and labetalol decreased to 100q8, lokelma given for hyperkalemia.   10/21: BD 21. POD0 PEG placement with Gen surg. decr labetolol to 50q8, incr. cardura to 0.4, started lokelma and phoslo, dc gabriel POD0 PEG placement with Gen surg.  10/22: BD 22. Plan to start TF today via PEG. dc labetalol, Following ophtho recs. Increased apnea settings - found to be in cheyne-moe respiration. CPAP 5/5.  10/23: BD 23. hydralazine d/c'd, trach collar trial today. Rectal tube placed at 6am.  10/24: BD24, o/n lokelma held due to diarrhea. Free water 100q6 resumed. dc'd tamsulosin, amantadine. Incr'd cardura to 8mg qhs. Dc'd FW. Switched jevity to nepro. gabriel placed for high urine output. Started SL atropine for oral secretions. Dc'd free water.  10/25: BD25, o/n decreased suctioning requirements to > q4hrs, GEORGE. Cr improving, cont phoslo, lokelma held at this time. Gabriel placed yest, cont. Tolerating trach collar. Given 500cc plasmalyte bolus for ANIKA. Dc'd sinemet.   10/26: BD26, o/n resumed lokelma 5mg daily and resumed 100cc free water q6hrs. Change in neuro status with new right pupillary dilation with anisocoria (right pupil 6mm fixed and left pupil 3mm briskly reactive). Given 23.4% NaCl bullet, taken for emergent CTH showing mostly resolved pontine hemorrhage, continued brainstem hypodensity likely edema d/t hemorrhage, no new hemorrhage or infarct, no herniation, mild increase in size of left lateral ventricle. Vitals remaine stable. Na goal > 140.   10/27: BD27, o/n GEORGE.Neuro stable. Pend stepdown with airway bed.   11/1: BD 32. GEORGE overnight. Given 1L NS for dehydration. Na 146, increased FW to 250q6.   11/2: BD 33 GEORGE overnight, neuro stable, given 500cc bolus for net negative status and tachycardia   11/3: BD 34, GEORGE overnight, neuro stable. Patient remains tachycardic, EKG showing sinus tachycardia, given additional 500cc NS bolus. Febrile to 101.9F, pan cultured (without UA), CXR WNL, given tylenol.   11/4: BD 35, GEORGE overnight, neuro stable. Given 1L NS for tachycardia. sputum (+) for stenotropohomas maltophilia.   11/5: BD 36 GEORGE overnight, neuro stable. Vancomycin dc'd. Chest PT BID. ID consulted, cont zosyn.  11/6: BD 37. blood cx + klebsiella dc zosyn changed to cefepime, CTAP ordered, rpt blood cx sent.    11/7: BD 38. Pending CT A/P, given 250cc bolus and starting maintenance fluids overnight. Pending CT A/P after bolus   11/8: BD 39. CT CAP negative for infection.   11/10: BD 41. GEORGE overnight. desat to 85 on trach collar, O2 inc to 10L and 100%, O2 sat inc to 95. pt tachy to 110s, euvolemic. given tylenol. ABG and CXR ordered. spiked fever, pancultured, RVP negative. AM ABG w pO2 79, rpt w pO2 79. pt appears comfortable, satting 94%.   11/11: BD 42. GEORGE overnight. pt became tachy to 130s, desat to 90 on 100% FiO2 and 10L. suctioned, (+) productive cough. temp 101.4, given 1g IV tylenol and 500cc NS bolus for euvolemia. fever and HR downtrending. LE dopplers negative for dvt  11/12: BD 43, GEORGE ovn, fever and HR downtrending, satting 97% 70% FIO2  11/13: BD 44, GEORGE ovn. started standing tylenol x24 hours for tachycardia. desat to 80s, o2 increased. CXR stable, pending CTA PE protocol.   11/15: BD 46, GEORGE overnight, neuro stable.  Rapid called for desaturation 30s, tachycardic 140s. Patient bagged, 100% fio2, heavily suctioned. CXR/POCUS unremarkable. ABG c/w desaturation. WBC 14.71. Afebrile. O2 improved to 90s and patient upgraded to ICU. ABG paO2 30s improved to 89 on vent. IV Tylenol x 1, sputum sent. Start protonix while o-n vent.   11/16: BD 47. POCUS showed collapsable IVCF, given 1L bolus. Vanco/Cefpime added empriic for PNA, NGT feeds restarted. MRSA swab neg, Vanc DC'd.   11/17: BD 48. GEORGE overnight. 1l bolus for tachycardia. Spiked to 101, cultured. 500cc bolus for tachycardia, tachy to 148 given 25mcg fentanyl, 250cc albumin, 1.5L bolus. 5 IV lopressor with response HR to 100s. +Stenotrophomonas on sputum cx.   11/18: BD 49. GEORGE overnight. Consulted ID, cefepime switched to bactrim x7days. Started hydrochlorothiazine 12.5mg daily.  11/19: BD 50. Tachy 120s, given tylenol and 500cc NS. Tolerating 5/5, switched to TCx. Holding phos binder. D/c Bactrim. D/c gabriel, f/u TOV. Dc'd PPI.   11/20: BD 51. GEORGE ovn. 1600 satting low 90s, mildly tachy to 110s, afebrile, RR wnl. O2 improved to mid 90s while inc O2 to 100% on TCx. CPAP 5/5 placed back on.  11/21: BD 52, GEORGE ovn, tolerating CPAP 5/5. Switch to trach collar during the day if tolerating well. HCTZ held for Cr bump, straight cath frequence increased to q4  11/22: BD 53, GEORGE ovn. Resumed phoslo. Gabriel placed. Resumed HCTZ.   11/23: BD 54. Holding tylenol in setting of possible fever, will require pan cx if febrile. Cr improved today. Cont CPAP. Bowel regimen held i/s/o diarrhea. FOBT negative.  11/24: BD 55. GEORGE overnight. Neuro stable. HCTZ dc'ed, started lisinopril 5. Lokelma dc'ed for K 3.7.   11/25: BD 56, GEORGE overnight. Neuro stable. dc'd lisinopril 5mg. Gabriel dc'd. TOV. 1545 noted to be hypotensive, MAP 50, in supine position on chair, HR 60s, afebrile, O2 96%. Given 1L cc NS bolus, placed back on bed in reverse trendelenberg, improved to map of 66. Neostick at bedside. Vitals check q1h. Dc'ed amlodipine. Failed TOV, bladder scan q6, sc prn. Added back Senna.   11/28: BD 59. Gabriel replaced.   12/2: BD 63, GEORGE overnight.; Given 1L bolus of LR for uptrending BUN/Cr.  12/3: BD 64. Reinstated eye gtt/moisture chamber given increased Rt eye injection  12/7: GEORGE overnight, neuro stable. /110s, given x1 hydralazine 10 mg IVP. Restarted home amlodipine 5mg.  12/11: GEORGE, mucomyst added for thick secretions, simethicone for abd distension, abd xray with stool burden, increased bowel regimen.   12/15: o/n Patient became tachycardic HR 120s and 10 minutes later O2 sat dropped as low as 89%. Patient suctioned without improvement in O2 sat and tube feeds found in suction catheter. TFs held.  STAT CXR ordered. STAT labs sent. Respiratory therapist called to bedside and patient trach connected to ventilator. After connection to ventilator and further suctioning O2 sat improved to 97% but patient HR remains 120-130s. Upgraded to NSICU for further management.      ICU Vital Signs Last 24 Hrs  T(C): 36.7 (15 Dec 2024 04:12), Max: 37.7 (14 Dec 2024 14:19)  T(F): 98 (15 Dec 2024 04:12), Max: 99.9 (14 Dec 2024 14:19)  HR: 81 (15 Dec 2024 08:00) (76 - 126)  BP: 120/90 (15 Dec 2024 08:00) (120/90 - 156/107)  BP(mean): 101 (15 Dec 2024 08:00) (98 - 125)  RR: 14 (15 Dec 2024 08:00) (14 - 22)  SpO2: 100% (15 Dec 2024 08:00) (92% - 100%)      12-14-24 @ 07:01  -  12-15-24 @ 07:00  --------------------------------------------------------  IN: 1660 mL / OUT: 1390 mL / NET: 270 mL    12-15-24 @ 07:01  -  12-15-24 @ 08:19  --------------------------------------------------------  IN: 0 mL / OUT: 65 mL / NET: -65 mL      Mode: AC/ CMV (Assist Control/ Continuous Mandatory Ventilation), RR (machine): 14, TV (machine): 400, FiO2: 70, PEEP: 5, ITime: 1, MAP: 7.4, PIP: 14      Gen: Awake, on vent full support  Neurological exam   Mental status: Awake, attend, oriented x 2 with choices by looking down, does not fully track but able to do so with left eye,  (EOMI impaired on R), following commands  CV: S1/S2, RRR  Lungs: Decreased breath sounds bilaterally, no wheezing  Abdomen: Bowel sounds  Extremities: Peripheral pulses present/symmetric. No edema      MEDICATIONS:   acetaminophen   Oral Liquid .. 650 milliGRAM(s) Oral every 6 hours PRN  acetylcysteine 10%  Inhalation 4 milliLiter(s) Inhalation every 6 hours  albuterol/ipratropium for Nebulization 3 milliLiter(s) Nebulizer every 6 hours  artificial tears (preservative free) Ophthalmic Solution 1 Drop(s) Right EYE every 4 hours  chlorhexidine 0.12% Liquid 15 milliLiter(s) Oral Mucosa every 12 hours  chlorhexidine 2% Cloths 1 Application(s) Topical <User Schedule>  doxazosin 8 milliGRAM(s) Oral at bedtime  erythromycin   Ointment 1 Application(s) Right EYE at bedtime  heparin   Injectable 5000 Unit(s) SubCutaneous every 8 hours  ofloxacin 0.3% Solution 1 Drop(s) Right EYE four times a day  pantoprazole  Injectable 40 milliGRAM(s) IV Push daily  petrolatum Ophthalmic Ointment 1 Application(s) Both EYES two times a day  piperacillin/tazobactam IVPB.. 3.375 Gram(s) IV Intermittent every 8 hours  polyethylene glycol 3350 17 Gram(s) Oral every 12 hours  senna 2 Tablet(s) Oral every 12 hours  simethicone 80 milliGRAM(s) Chew three times a day  vancomycin  IVPB 1250 milliGRAM(s) IV Intermittent every 12 hours      LABS:                        11.3   13.98 )-----------( 183      ( 15 Dec 2024 05:23 )             35.8     12-15    140  |  103  |  41[H]  ----------------------------<  119[H]  3.9   |  24  |  1.19    Ca    8.5      15 Dec 2024 05:23  Phos  4.4     12-15  Mg     2.0     12-15        ABG - ( 15 Dec 2024 02:38 )  pH, Arterial: 7.43  pH, Blood: x     /  pCO2: 39    /  pO2: 243   / HCO3: 26    / Base Excess: 1.5   /  SaO2: 100.0

## 2024-12-15 NOTE — PROGRESS NOTE ADULT - SUBJECTIVE AND OBJECTIVE BOX
HPI:  Unknown age male, unknown past medical history (reported stroke and MI by coworkers) presented to [facility] with AMS, Pt was working at [employer] when he was found down by coworkers. EMS called and pt brought to [facility] ED. Intubated, sedated, started on cardene for SBPs in 200s. CT head showed brain stem bleed. Transferred to NSICU for further management.  (30 Sep 2024 12:55)      Subjective:  o/n Patient became tachycardic HR 120s and 10 minutes later O2 sat dropped as low as 89%. Patient suctioned without improvement in O2 sat and tube feeds found in suction catheter. TFs held.  STAT CXR ordered. STAT labs sent. Respiratory therapist called to bedside and patient trach connected to ventilator. After connection to ventilator and further suctioning O2 sat improved to 97% but patient HR remains 120-130s. Upgraded to NSICU for further management. Vancomycin and zosyn started. CTA  chest PE protocol and CTH ordered. Blood cultures sent.  Son, Olivier, updated via telephone. : [place], ID# [id number]    Hospital Course:   : transferred from [facility]. A line placed. Versed dc'd. Roomate Prasanth at bedside, states pt has family in [place], cannot confirm medications or PMH other than stroke and MI. 250cc bolus 3% given. LR switched to NS. hydralazine 25q8 started, 3% started, switched propofol to precedex   10/1: stability CTH done. Added labetalol, started TF. Palliative consulted. ethics consulted to determine surrogate. febrile 103, pan cx sent  10/2: BD 2, GEORGE overnight. TF resumed. Desatt'd to 80s, FiO2 inc. to 50. Fentanyl given, ABG, CXR ordered. Maxxed on precedex, started on propofol for DARIEN -4 - -5. Precedex dc'd. Duonebs, mucomyst, hypertonic added. 3% dc'd. Cardene dc'd. Start vanc/CTX. Increased labetalol 200q8. MRSA negative, dc'd vanc. ETT pulled back 2cm x 2, good positioning after confirmatory chest xray. Ethics attempting to establish HCP with family. Na 159, starting FW 250q6 for range 150-155.   10/3: BD3, GEORGE o/n, neuro stable. Na elevating, FW increased to 300q6. Dc'd bowel reg for diarrhea. vEEG started. SQH 5000q8 tonight.   10/4: BD 4, albumn bolus, incr. LR to 80 2/2 incr. in Cr, LR to 100cc/hr for uptrending Cr. Started 7% hypersal for 48hrs and SL atropine for inline/oral thick secretions. Dc'd CTX and started ancef for MSSA in the sputum. Nephrology consulted for CKD, f/u recs. SBP 170s, given hydralazine 10mg IVP.   10/5: BD5, o/n 10mg IVP hydralazine given for SBP 170s and started on hydralazine 25q8 via OGT. 10mg IV push labetalol for SBP > 160s. RT placed for diarrhea.   10/6: BD6, o/n FW increased to 350q4 per nephrology recs. IV tylenol for temp 100.6, SBp 160s presumed uncomfortable.   10/7: BD7, overnight pancultured for temp 101.8F.   10/8: BD8. GEORGE. Cr bumped. decreased LR to 75cc/hr. Adding simethicone ATC. incr hydralazine 50mgTID. Incr labetalol 300mgTID. Na 145, decreased FWF to 250q6. Start precedex. FENa consistent with intrinsic kidney injury. Pend repeat renal US. Retaining up to 1.3L, bladder scans q6, straight cath PRN  10/9: BD 9. GEORGE overnight. Neuro stable. abd xray for distention w non-specific gas pattern, OGT to LIWS for morning. duonebs/mucomyst to q8 for improving secretions. Changed tube feeds to Jevity 1.5 20cc/hr, low rate due to abdominal distention, nepro dense and more difficult to digest. Tolerating CPAP, confirmed by ABG.   10/10: BD 10. GEORGE overnight. Neuro stable. (+) gabriel for urinary retention on bladder scan. inc TF to goal rate of 40cc/hr. family leaning toward pursuing trach/PEG. 1/2 amp for FS 81.   10/11: BD 11. GEORGE overnight. Neuro stable. Trach/PEG consults placed.   10/12: BD 12. GEORGE overnight. Neuro stable. MRI brain complete.   10/13: BD 13. Increase flomax. Hold SQH after PM dose for trach tm. IVL.   10/14: BD 14. GEORGE overnight, remains on AC/VC. Gabriel placed for urinary retention. Dc'd free water.  S/p trach with pulm. NGT placed and CXR confirmed in good position.   10/15: BD 15, GEORGE ovn. resumed feeds. spiked 101, pan cx sent.   10/16: BD 16. GEORGE ovn. Lokelma 5mg for K+ 5.4. Started vanc q 24/zosyn for empiric PNA coverage, IVF to 100/hr. PEG held for fever.   10/17: BD 17,  ordered serum osm and urine osm for am. Started sinemet for neurostimulation. Increased cardura to 0.8. Started FW 100q4, dc'd IVF. MRSA negative, dc'd vanc. NGT replaced d/t coiling.   10/18: BD 18, GEORGE overnight, neuro stable. Amantadine added for neurostim. zosyn changed to unasyn for acinetobacter baumannii, failed TOV and required SC  10/19: BD 19, GEORGE ovn. cardura 2mg added for retention. labetalol decreased 200q8, hydralazine decreased 25q8. Gabriel replaced.   10/20: BD20, GEORGE overnight. NGT dislodged, replaced. PEG tomorrow w/ gen surg, FW increased to 150q4 and labetalol decreased to 100q8, lokelma given for hyperkalemia.   10/21: BD 21. POD0 PEG placement with Gen surg. decr labetolol to 50q8, incr. cardura to 0.4, started lokelma and phoslo, dc gabriel POD0 PEG placement with Gen surg.  10/22: BD 22. Plan to start TF today via PEG. dc labetalol, Following ophtho recs. Increased apnea settings - found to be in cheyne-moe respiration. CPAP 5/5.  10/23: BD 23. hydralazine d/c'd, trach collar trial today. Rectal tube placed at 6am.  10/24: BD24, o/n lokelma held due to diarrhea. Free water 100q6 resumed. dc'd tamsulosin, amantadine. Incr'd cardura to 8mg qhs. Dc'd FW. Switched jevity to nepro. gabriel placed for high urine output. Started SL atropine for oral secretions. Dc'd free water.  10/25: BD25, o/n decreased suctioning requirements to > q4hrs, GEORGE. Cr improving, cont phoslo, lokelma held at this time. Gabriel placed yest, cont. Tolerating trach collar. Given 500cc plasmalyte bolus for ANIKA. Dc'd sinemet.   10/26: BD26, o/n resumed lokelma 5mg daily and resumed 100cc free water q6hrs. Change in neuro status with new right pupillary dilation with anisocoria (right pupil 6mm fixed and left pupil 3mm briskly reactive). Given 23.4% NaCl bullet, taken for emergent CTH showing mostly resolved pontine hemorrhage, continued brainstem hypodensity likely edema d/t hemorrhage, no new hemorrhage or infarct, no herniation, mild increase in size of left lateral ventricle. Vitals remaine stable. Na goal > 140.   10/27: BD27, o/n GEORGE.Neuro stable. Pend stepdown with airway bed.   10/28: BD 28. GEORGE overnight. Neuro stable. Miralax ordered. Gabriel removed, pending TOV.  10/29: BD 29. GEORGE o/n. Given 2L NS over 8 hrs for increased BUN/Cr ratio. Gabriel placed for frequent straight cath.   10/30: BD 30.   10/31: BD 31. GEORGE overnight. Na 149, increased free water to 200q6. 1L NS for uptrending BUN.   : BD 32. GEORGE overnight. Given 1L NS for dehydration. Na 146, increased FW to 250q6.   : BD 33 GEORGE overnight, neuro stable, given 500cc bolus for net negative status and tachycardia   11/3: BD 34, GEORGE overnight, neuro stable. Patient remains tachycardic, EKG showing sinus tachycardia, given additional 500cc NS bolus. Febrile to 101.9F, pan cultured (without UA), CXR WNL, given tylenol.   : BD 35, GEORGE overnight, neuro stable. Given 1L NS for tachycardia. sputum (+) for stenotropohomas maltophilia.   : BD 36 GEORGE overnight, neuro stable. Vancomycin dc'd. Chest PT BID. ID consulted, cont zosyn.  : BD 37. blood cx + klebsiella dc zosyn changed to cefepime, CTAP ordered, rpt blood cx sent.    : BD 38. Pending CT A/P, given 250cc bolus and starting maintenance fluids overnight. Pending CT A/P after bolus   : BD 39. CT CAP negative for infection.   : BD 40. GEORGE overnight.  11/10: BD 41. GEORGE overnight. desat to 85 on trach collar, O2 inc to 10L and 100%, O2 sat inc to 95. pt tachy to 110s, euvolemic. given tylenol. ABG and CXR ordered. spiked fever, pancultured, RVP negative. AM ABG w pO2 79, rpt w pO2 79. pt appears comfortable, satting 94%.   : BD 42. GEORGE overnight. pt became tachy to 130s, desat to 90 on 100% FiO2 and 10L. suctioned, (+) productive cough. temp 101.4, given 1g IV tylenol and 500cc NS bolus for euvolemia. fever and HR downtrending. LE dopplers negative for dvt  : BD 43, GEORGE ovn, fever and HR downtrending, satting 97% 70% FIO2  : BD 44, GEORGE ovn. started standing tylenol x24 hours for tachycardia. desat to 80s, o2 increased. CXR stable, pending CTA PE protocol.   : BD 45, GEORGE overnight, neuro stable. resp therapy dec FiO2 to 70%.   11/15: BD 46, GEORGE overnight, neuro stable.  Rapid called for desaturation 30s, tachycardic 140s. Patient bagged, 100% fio2, heavily suctioned. CXR/POCUS unremarkable. ABG c/w desaturation. WBC 14.71. Afebrile. O2 improved to 90s and patient upgraded to ICU. ABG paO2 30s improved to 89 on vent. IV Tylenol x 1, sputum sent. Start protonix while o-n vent.   : BD 47. POCUS showed collapsable IVCF, given 1L bolus. Vanco/Cefpime added empriic for PNA, NGT feeds restarted. MRSA swab neg, Vanc DC'd.   : BD 48. GEORGE overnight. 1l bolus for tachycardia. Spiked to 101, cultured. 500cc bolus for tachycardia, tachy to 148 given 25mcg fentanyl, 250cc albumin, 1.5L bolus. 5 IV lopressor with response HR to 100s. +Stenotrophomonas on sputum cx.   : BD 49. GEORGE overnight. Consulted ID, cefepime switched to bactrim x7days. Started hydrochlorothiazine 12.5mg daily.  : BD 50. Tachy 120s, given tylenol and 500cc NS. Tolerating 5/5, switched to TCx. Holding phos binder. D/c Bactrim. D/c gabriel, f/u TOV. Dc'd PPI.   : BD 51. GEORGE ovn. 1600 satting low 90s, mildly tachy to 110s, afebrile, RR wnl. O2 improved to mid 90s while inc O2 to 100% on TCx. CPAP 5/5 placed back on.  : BD 52, GEORGE ovn, tolerating CPAP 5/5. Switch to trach collar during the day if tolerating well. HCTZ held for Cr bump, straight cath frequence increased to q4  : BD 53, GEORGE ovn. Resumed phoslo. Gabriel placed. Resumed HCTZ.   : BD 54. Holding tylenol in setting of possible fever, will require pan cx if febrile. Cr improved today. Cont CPAP. Bowel regimen held i/s/o diarrhea. FOBT negative.  : BD 55. GEORGE overnight. Neuro stable. HCTZ dc'ed, started lisinopril 5. Lokelma dc'ed for K 3.7.   : BD 56, GEORGE overnight. Neuro stable. dc'd lisinopril 5mg. Gabriel dc'd. TOV. 1545 noted to be hypotensive, MAP 50, in supine position on chair, HR 60s, afebrile, O2 96%. Given 1L cc NS bolus, placed back on bed in reverse trendelenberg, improved to map of 66. Neostick at bedside. Vitals check q1h. Dc'ed amlodipine. Failed TOV, bladder scan q6, sc prn. Added back Senna.   : BD 57, GEORGE overnight. Neuro stable. Dc'd phoslo.   : BD 58, GEORGE overnight. Neuro stable.    : BD 59. Gabriel replaced.   : GEORGE.  : GEORGE, neuro stable.   : BD 62, GEORGE overnight  : BD 63, GEORGE overnight.; Given 1L bolus of LR for uptrending BUN/Cr.  12/3: BD 64. Reinstated eye gtt/moisture chamber given increased Rt eye injection  : BD 65. GEORGE overnight. Attempted to speak with ophtho regarding eyelid weight/closure but no answer, full mailbox.   : BD 66, GEORGE overnight, bowel regimen increased and had BM.   : BD 67, GEORGE overnight, neuro stable.  : GEORGE overnight, neuro stable. /110s, given x1 hydralazine 10 mg IVP. Restarted home amlodipine 5mg.  : GEORGE. OOB to chair.     : GEORGE, mucomyst added for thick secretions, simethicone for abd distension, abd xray with stool burden, increased bowel regimen.   : GEORGE overnight.   : GEORGE overnight.   : GEORGE overnight  12/15: o/n Patient became tachycardic HR 120s and 10 minutes later O2 sat dropped as low as 89%. Patient suctioned without improvement in O2 sat and tube feeds found in suction catheter. TFs held.  STAT CXR ordered. STAT labs sent. Respiratory therapist called to bedside and patient trach connected to ventilator. After connection to ventilator and further suctioning O2 sat improved to 97% but patient HR remains 120-130s. Upgraded to NSICU for further management. Vancomycin and zosyn started. CTA  chest PE protocol and CTH ordered. Blood cultures sent.      Vital Signs Last 24 Hrs  T(C): 37.6 (14 Dec 2024 22:14), Max: 37.8 (14 Dec 2024 05:06)  T(F): 99.6 (14 Dec 2024 22:14), Max: 100.1 (14 Dec 2024 05:06)  HR: 122 (15 Dec 2024 00:55) (76 - 126)  BP: 131/81 (14 Dec 2024 23:40) (109/78 - 144/99)  BP(mean): 98 (14 Dec 2024 23:40) (89 - 116)  RR: 18 (14 Dec 2024 23:40) (16 - 18)  SpO2: 94% (15 Dec 2024 00:55) (92% - 100%)    Parameters below as of 14 Dec 2024 23:40  Patient On (Oxygen Delivery Method): tracheostomy collar  O2 Flow (L/min): 10  O2 Concentration (%): 40    I&O's Detail    13 Dec 2024 07:01  -  14 Dec 2024 07:00  --------------------------------------------------------  IN:    Enteral Tube Flush: 240 mL    Free Water: 500 mL    Miscellaneous Tube Feedin mL  Total IN: 2030 mL    OUT:    Indwelling Catheter - Urethral (mL): 1825 mL  Total OUT: 1825 mL    Total NET: 205 mL      14 Dec 2024 07:01  -  15 Dec 2024 01:12  --------------------------------------------------------  IN:    Miscellaneous Tube Feedin mL  Total IN: 720 mL    OUT:    Indwelling Catheter - Urethral (mL): 490 mL    Voided (mL): 250 mL  Total OUT: 740 mL    Total NET: -20 mL        I&O's Summary    13 Dec 2024 07:01  -  14 Dec 2024 07:00  --------------------------------------------------------  IN: 2030 mL / OUT: 1825 mL / NET: 205 mL    14 Dec 2024 07:01  -  15 Dec 2024 01:12  --------------------------------------------------------  IN: 720 mL / OUT: 740 mL / NET: -20 mL        PHYSICAL EXAM prior to connection to ventilator:  General: patient seen laying supine in bed in NAD  Neuro: OEs spontaneously, A&Ox0, R hand moves fingers spontaneously,  RLE with some spontaneous toe movement, LLE  withdrawal to noxious, LUE 0/5  HEENT: PERRL, only tracks vertically, +trach collar  Neck: supple  Cardiac: RRR, S1S2  Pulmonary: chest rise symmetric  Abdomen: soft, nontender, nondistended, +PEG  Ext: perfusing well  Skin: warm, dry    LABS:                        11.3   10.83 )-----------( 184      ( 15 Dec 2024 00:01 )             35.2     12-15    139  |  102  |  42[H]  ----------------------------<  135[H]  3.7   |  26  |  1.20    Ca    8.7      15 Dec 2024 00:01  Phos  3.1     12-15  Mg     2.0     12-15      PT/INR - ( 15 Dec 2024 00:07 )   PT: 11.4 sec;   INR: 0.97          PTT - ( 15 Dec 2024 00:07 )  PTT:34.1 sec  Urinalysis Basic - ( 15 Dec 2024 00:01 )    Color: x / Appearance: x / SG: x / pH: x  Gluc: 135 mg/dL / Ketone: x  / Bili: x / Urobili: x   Blood: x / Protein: x / Nitrite: x   Leuk Esterase: x / RBC: x / WBC x   Sq Epi: x / Non Sq Epi: x / Bacteria: x          CAPILLARY BLOOD GLUCOSE      POCT Blood Glucose.: 135 mg/dL (14 Dec 2024 23:59)      Drug Levels: [] N/A    CSF Analysis: [] N/A      Allergies    Allergy Status Unknown    Intolerances      MEDICATIONS:  Antibiotics:  piperacillin/tazobactam IVPB. 3.375 Gram(s) IV Intermittent once  piperacillin/tazobactam IVPB.- 3.375 Gram(s) IV Intermittent once  piperacillin/tazobactam IVPB.. 3.375 Gram(s) IV Intermittent every 8 hours  vancomycin  IVPB 1250 milliGRAM(s) IV Intermittent every 12 hours    Neuro:  acetaminophen   Oral Liquid .. 650 milliGRAM(s) Oral every 6 hours PRN    Anticoagulation:  heparin   Injectable 5000 Unit(s) SubCutaneous every 8 hours    OTHER:  acetylcysteine 10%  Inhalation 4 milliLiter(s) Inhalation every 6 hours  albuterol/ipratropium for Nebulization 3 milliLiter(s) Nebulizer every 6 hours  amLODIPine   Tablet 5 milliGRAM(s) Oral daily  artificial tears (preservative free) Ophthalmic Solution 1 Drop(s) Right EYE every 4 hours  chlorhexidine 0.12% Liquid 15 milliLiter(s) Oral Mucosa every 12 hours  chlorhexidine 2% Cloths 1 Application(s) Topical <User Schedule>  doxazosin 8 milliGRAM(s) Oral at bedtime  erythromycin   Ointment 1 Application(s) Right EYE at bedtime  ofloxacin 0.3% Solution 1 Drop(s) Right EYE four times a day  pantoprazole  Injectable 40 milliGRAM(s) IV Push daily  petrolatum Ophthalmic Ointment 1 Application(s) Both EYES two times a day  polyethylene glycol 3350 17 Gram(s) Oral every 12 hours  senna 2 Tablet(s) Oral every 12 hours  simethicone 80 milliGRAM(s) Chew three times a day    IVF:    CULTURES:    RADIOLOGY & ADDITIONAL TESTS:    AMS    Handoff    MEWS Score    Acute myocardial infarction    CVA (cerebral vascular accident)    Intracerebral hemorrhage of brain stem    Brainstem stroke    Brain stem stroke syndrome    Brain stem hemorrhage    Brain stem stroke syndrome    Hemorrhagic stroke    Brainstem stroke    Encephalopathy acute    Functional quadriplegia    Advanced care planning/counseling discussion    Encounter for palliative care    Pontine hemorrhage    Neurogenic dysphagia    Chronic respiratory failure    Acute kidney injury superimposed on CKD    Acute urinary retention    Hypertensive emergency    Sepsis, unspecified organism    Sepsis    Gram-negative bacteremia    Percutaneous tracheal puncture    Altered mental status examination    EGD, with PEG    AMS    [email or web address]        ASSESSMENT:  43 y/o PMH ?stroke/MI present to [facility] after collapsing at work. Decorticate posturing, vomiting, intubated for airway protection. 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 Transferred to [facility] for further management. s/p trach 10/14. s/p peg 10/21. Re-upgrade to ICU 2/2 desaturation event and suctioning requirements 11/15.     PLAN:  Neuro:  - neuro checks q4/vitals q1h, protected sleep time 10PM-5AM  - pain control: tylenol prn  - vEEG (10/3-)- negative, (10/17-10/19) - negative  - CTH : enlarged pontine hemorrhage, CTH 10/3: stable, CTH 10/25: mostly resolved pontine hemorrhage  - MRI brain 10/12: parenchymal hemorrhage, acute/subacute R cerebellar stroke    - stroke core measures, stroke neuro signed off  - pending CTH 12/15    CV:  - -160  - HTN: amlodipine 5mg, hold lisinopril 5mg   - echo () EF 75%    Resp:  - trach to vent, FVS  - pending CTA PE protocol 12/15  - Secretions: duonebs/mucomyst/chest PT q8h   - CTA chest  negative for PE, inc ground glass opacities    GI:  - TF via PEG (placed 10/21 by gen surg)  - bowel regimen, last BM     Renal:  - IVL; FW flushes 250cc q6hrs   - Urinary retenion: Gabriel replaced   - CKD: trend BUN/Cr  - renal US 10/1: echogenicity c/w chronic med renal dz, repeat 10/8: inc renal echogeicity, c/f medical renal disease w increased hydro     Endo:  - A1c 5.4    Heme:  - DVT ppx: SCDs, SQH 5000u q8h   - LE dopplers negative     ID:  - vanc/zosyn started empirically 12/15  - f/u blood cultures 12/15  - last pancx , MRSA swab neg    -  s/p Stenotrophomonas maltophilia PNA: s/p Bactrim (-) s/p Cefepime (-)  - 11/3, (+) sputum for stenotrophomas maltophlia, blood cx (+) klebsiella, cefepime 2gq12 ( - )   - empiric tx: s/p zosyn (11/3-) , s/p vanc  (11/3-)  - S/p Ancef (10/4-10/14) for PNA, and s/p Unasyn (10/18-10/23) +actinobacter baumanii     MISC:  - ophtho consult for keratitis  - erythromycin ointment to rt eye q4hrs, ofloxacin ointment to rt eye QID, artificial tears to rt eye q2hrs, moisture chamber at bedtime    Dispo: ICU status, full code, pending placement and guardianship hearing     D/w Dr. D'Amico and Dr. Tomlin      Assessment:  Present when checked    []  GCS  E   V  M     Heart Failure: []Acute, [] acute on chronic , []chronic  Heart Failure:  [] Diastolic (HFpEF), [] Systolic (HFrEF), []Combined (HFpEF and HFrEF), [] RHF, [] Pulm HTN, [] Other    [] ANIKA, [] ATN, [] AIN, [] other  [] CKD1, [] CKD2, [] CKD 3, [] CKD 4, [] CKD 5, []ESRD    Encephalopathy: [] Metabolic, [] Hepatic, [] toxic, [] Neurological, [] Other    Abnormal Nurtitional Status: [] malnurtition (see nutrition note), [ ]underweight: BMI < 19, [] morbid obesity: BMI >40, [] Cachexia    [] Sepsis  [] hypovolemic shock,[] cardiogenic shock, [] hemorrhagic shock, [] neuogenic shock  [] Acute Respiratory Failure  []Cerebral edema, [] Brain compression/ herniation,   [] Functional quadriplegia  [] Acute blood loss anemia   HPI:  Unknown age male, unknown past medical history (reported stroke and MI by coworkers) presented to [facility] with AMS, Pt was working at [employer] when he was found down by coworkers. EMS called and pt brought to [facility] ED. Intubated, sedated, started on cardene for SBPs in 200s. CT head showed brain stem bleed. Transferred to NSICU for further management.  (30 Sep 2024 12:55)      Subjective:  o/n Patient became tachycardic HR 120s and 10 minutes later O2 sat dropped as low as 89%. Patient suctioned without improvement in O2 sat and tube feeds found in suction catheter. TFs held.  STAT CXR ordered. STAT labs sent. Respiratory therapist called to bedside and patient trach connected to ventilator. After connection to ventilator and further suctioning O2 sat improved to 97% but patient HR remains 120-130s. Upgraded to NSICU for further management. Vancomycin and zosyn started. CTA  chest PE protocol and CTH ordered. Blood cultures sent.  Son, Olivier, updated via telephone. : [place], ID# [id number]    Hospital Course:   : transferred from [facility]. A line placed. Versed dc'd. Roomate Prasanth at bedside, states pt has family in [place], cannot confirm medications or PMH other than stroke and MI. 250cc bolus 3% given. LR switched to NS. hydralazine 25q8 started, 3% started, switched propofol to precedex   10/1: stability CTH done. Added labetalol, started TF. Palliative consulted. ethics consulted to determine surrogate. febrile 103, pan cx sent  10/2: BD 2, GEORGE overnight. TF resumed. Desatt'd to 80s, FiO2 inc. to 50. Fentanyl given, ABG, CXR ordered. Maxxed on precedex, started on propofol for DARIEN -4 - -5. Precedex dc'd. Duonebs, mucomyst, hypertonic added. 3% dc'd. Cardene dc'd. Start vanc/CTX. Increased labetalol 200q8. MRSA negative, dc'd vanc. ETT pulled back 2cm x 2, good positioning after confirmatory chest xray. Ethics attempting to establish HCP with family. Na 159, starting FW 250q6 for range 150-155.   10/3: BD3, GEORGE o/n, neuro stable. Na elevating, FW increased to 300q6. Dc'd bowel reg for diarrhea. vEEG started. SQH 5000q8 tonight.   10/4: BD 4, albumn bolus, incr. LR to 80 2/2 incr. in Cr, LR to 100cc/hr for uptrending Cr. Started 7% hypersal for 48hrs and SL atropine for inline/oral thick secretions. Dc'd CTX and started ancef for MSSA in the sputum. Nephrology consulted for CKD, f/u recs. SBP 170s, given hydralazine 10mg IVP.   10/5: BD5, o/n 10mg IVP hydralazine given for SBP 170s and started on hydralazine 25q8 via OGT. 10mg IV push labetalol for SBP > 160s. RT placed for diarrhea.   10/6: BD6, o/n FW increased to 350q4 per nephrology recs. IV tylenol for temp 100.6, SBp 160s presumed uncomfortable.   10/7: BD7, overnight pancultured for temp 101.8F.   10/8: BD8. GEORGE. Cr bumped. decreased LR to 75cc/hr. Adding simethicone ATC. incr hydralazine 50mgTID. Incr labetalol 300mgTID. Na 145, decreased FWF to 250q6. Start precedex. FENa consistent with intrinsic kidney injury. Pend repeat renal US. Retaining up to 1.3L, bladder scans q6, straight cath PRN  10/9: BD 9. GEORGE overnight. Neuro stable. abd xray for distention w non-specific gas pattern, OGT to LIWS for morning. duonebs/mucomyst to q8 for improving secretions. Changed tube feeds to Jevity 1.5 20cc/hr, low rate due to abdominal distention, nepro dense and more difficult to digest. Tolerating CPAP, confirmed by ABG.   10/10: BD 10. GEORGE overnight. Neuro stable. (+) gabriel for urinary retention on bladder scan. inc TF to goal rate of 40cc/hr. family leaning toward pursuing trach/PEG. 1/2 amp for FS 81.   10/11: BD 11. GEORGE overnight. Neuro stable. Trach/PEG consults placed.   10/12: BD 12. GEORGE overnight. Neuro stable. MRI brain complete.   10/13: BD 13. Increase flomax. Hold SQH after PM dose for trach tm. IVL.   10/14: BD 14. GEORGE overnight, remains on AC/VC. Gabriel placed for urinary retention. Dc'd free water.  S/p trach with pulm. NGT placed and CXR confirmed in good position.   10/15: BD 15, GEORGE ovn. resumed feeds. spiked 101, pan cx sent.   10/16: BD 16. GEORGE ovn. Lokelma 5mg for K+ 5.4. Started vanc q 24/zosyn for empiric PNA coverage, IVF to 100/hr. PEG held for fever.   10/17: BD 17,  ordered serum osm and urine osm for am. Started sinemet for neurostimulation. Increased cardura to 0.8. Started FW 100q4, dc'd IVF. MRSA negative, dc'd vanc. NGT replaced d/t coiling.   10/18: BD 18, GEORGE overnight, neuro stable. Amantadine added for neurostim. zosyn changed to unasyn for acinetobacter baumannii, failed TOV and required SC  10/19: BD 19, GEORGE ovn. cardura 2mg added for retention. labetalol decreased 200q8, hydralazine decreased 25q8. Gabriel replaced.   10/20: BD20, GEORGE overnight. NGT dislodged, replaced. PEG tomorrow w/ gen surg, FW increased to 150q4 and labetalol decreased to 100q8, lokelma given for hyperkalemia.   10/21: BD 21. POD0 PEG placement with Gen surg. decr labetolol to 50q8, incr. cardura to 0.4, started lokelma and phoslo, dc gabriel POD0 PEG placement with Gen surg.  10/22: BD 22. Plan to start TF today via PEG. dc labetalol, Following ophtho recs. Increased apnea settings - found to be in cheyne-moe respiration. CPAP 5/5.  10/23: BD 23. hydralazine d/c'd, trach collar trial today. Rectal tube placed at 6am.  10/24: BD24, o/n lokelma held due to diarrhea. Free water 100q6 resumed. dc'd tamsulosin, amantadine. Incr'd cardura to 8mg qhs. Dc'd FW. Switched jevity to nepro. gabriel placed for high urine output. Started SL atropine for oral secretions. Dc'd free water.  10/25: BD25, o/n decreased suctioning requirements to > q4hrs, GEORGE. Cr improving, cont phoslo, lokelma held at this time. Gabriel placed yest, cont. Tolerating trach collar. Given 500cc plasmalyte bolus for ANIKA. Dc'd sinemet.   10/26: BD26, o/n resumed lokelma 5mg daily and resumed 100cc free water q6hrs. Change in neuro status with new right pupillary dilation with anisocoria (right pupil 6mm fixed and left pupil 3mm briskly reactive). Given 23.4% NaCl bullet, taken for emergent CTH showing mostly resolved pontine hemorrhage, continued brainstem hypodensity likely edema d/t hemorrhage, no new hemorrhage or infarct, no herniation, mild increase in size of left lateral ventricle. Vitals remaine stable. Na goal > 140.   10/27: BD27, o/n GEORGE.Neuro stable. Pend stepdown with airway bed.   10/28: BD 28. GEORGE overnight. Neuro stable. Miralax ordered. Gabriel removed, pending TOV.  10/29: BD 29. GEORGE o/n. Given 2L NS over 8 hrs for increased BUN/Cr ratio. Gabriel placed for frequent straight cath.   10/30: BD 30.   10/31: BD 31. GEORGE overnight. Na 149, increased free water to 200q6. 1L NS for uptrending BUN.   : BD 32. GEORGE overnight. Given 1L NS for dehydration. Na 146, increased FW to 250q6.   : BD 33 GEORGE overnight, neuro stable, given 500cc bolus for net negative status and tachycardia   11/3: BD 34, GEORGE overnight, neuro stable. Patient remains tachycardic, EKG showing sinus tachycardia, given additional 500cc NS bolus. Febrile to 101.9F, pan cultured (without UA), CXR WNL, given tylenol.   : BD 35, GEORGE overnight, neuro stable. Given 1L NS for tachycardia. sputum (+) for stenotropohomas maltophilia.   : BD 36 GEORGE overnight, neuro stable. Vancomycin dc'd. Chest PT BID. ID consulted, cont zosyn.  : BD 37. blood cx + klebsiella dc zosyn changed to cefepime, CTAP ordered, rpt blood cx sent.    : BD 38. Pending CT A/P, given 250cc bolus and starting maintenance fluids overnight. Pending CT A/P after bolus   : BD 39. CT CAP negative for infection.   : BD 40. GEORGE overnight.  11/10: BD 41. GEORGE overnight. desat to 85 on trach collar, O2 inc to 10L and 100%, O2 sat inc to 95. pt tachy to 110s, euvolemic. given tylenol. ABG and CXR ordered. spiked fever, pancultured, RVP negative. AM ABG w pO2 79, rpt w pO2 79. pt appears comfortable, satting 94%.   : BD 42. GEORGE overnight. pt became tachy to 130s, desat to 90 on 100% FiO2 and 10L. suctioned, (+) productive cough. temp 101.4, given 1g IV tylenol and 500cc NS bolus for euvolemia. fever and HR downtrending. LE dopplers negative for dvt  : BD 43, GEORGE ovn, fever and HR downtrending, satting 97% 70% FIO2  : BD 44, GEORGE ovn. started standing tylenol x24 hours for tachycardia. desat to 80s, o2 increased. CXR stable, pending CTA PE protocol.   : BD 45, GEORGE overnight, neuro stable. resp therapy dec FiO2 to 70%.   11/15: BD 46, GEORGE overnight, neuro stable.  Rapid called for desaturation 30s, tachycardic 140s. Patient bagged, 100% fio2, heavily suctioned. CXR/POCUS unremarkable. ABG c/w desaturation. WBC 14.71. Afebrile. O2 improved to 90s and patient upgraded to ICU. ABG paO2 30s improved to 89 on vent. IV Tylenol x 1, sputum sent. Start protonix while o-n vent.   : BD 47. POCUS showed collapsable IVCF, given 1L bolus. Vanco/Cefpime added empriic for PNA, NGT feeds restarted. MRSA swab neg, Vanc DC'd.   : BD 48. GEORGE overnight. 1l bolus for tachycardia. Spiked to 101, cultured. 500cc bolus for tachycardia, tachy to 148 given 25mcg fentanyl, 250cc albumin, 1.5L bolus. 5 IV lopressor with response HR to 100s. +Stenotrophomonas on sputum cx.   : BD 49. GEORGE overnight. Consulted ID, cefepime switched to bactrim x7days. Started hydrochlorothiazine 12.5mg daily.  : BD 50. Tachy 120s, given tylenol and 500cc NS. Tolerating 5/5, switched to TCx. Holding phos binder. D/c Bactrim. D/c gabriel, f/u TOV. Dc'd PPI.   : BD 51. GEORGE ovn. 1600 satting low 90s, mildly tachy to 110s, afebrile, RR wnl. O2 improved to mid 90s while inc O2 to 100% on TCx. CPAP 5/5 placed back on.  : BD 52, GEORGE ovn, tolerating CPAP 5/5. Switch to trach collar during the day if tolerating well. HCTZ held for Cr bump, straight cath frequence increased to q4  : BD 53, GEORGE ovn. Resumed phoslo. Gabriel placed. Resumed HCTZ.   : BD 54. Holding tylenol in setting of possible fever, will require pan cx if febrile. Cr improved today. Cont CPAP. Bowel regimen held i/s/o diarrhea. FOBT negative.  : BD 55. GEORGE overnight. Neuro stable. HCTZ dc'ed, started lisinopril 5. Lokelma dc'ed for K 3.7.   : BD 56, GEORGE overnight. Neuro stable. dc'd lisinopril 5mg. Gabriel dc'd. TOV. 1545 noted to be hypotensive, MAP 50, in supine position on chair, HR 60s, afebrile, O2 96%. Given 1L cc NS bolus, placed back on bed in reverse trendelenberg, improved to map of 66. Neostick at bedside. Vitals check q1h. Dc'ed amlodipine. Failed TOV, bladder scan q6, sc prn. Added back Senna.   : BD 57, GEORGE overnight. Neuro stable. Dc'd phoslo.   : BD 58, GEORGE overnight. Neuro stable.    : BD 59. Gabriel replaced.   : GEORGE.  : GEORGE, neuro stable.   : BD 62, GEORGE overnight  : BD 63, GEORGE overnight.; Given 1L bolus of LR for uptrending BUN/Cr.  12/3: BD 64. Reinstated eye gtt/moisture chamber given increased Rt eye injection  : BD 65. GEORGE overnight. Attempted to speak with ophtho regarding eyelid weight/closure but no answer, full mailbox.   : BD 66, GEORGE overnight, bowel regimen increased and had BM.   : BD 67, GEORGE overnight, neuro stable.  : GEORGE overnight, neuro stable. /110s, given x1 hydralazine 10 mg IVP. Restarted home amlodipine 5mg.  : GEORGE. OOB to chair.     : GEORGE, mucomyst added for thick secretions, simethicone for abd distension, abd xray with stool burden, increased bowel regimen.   : GEORGE overnight.   : GEORGE overnight.   : GEORGE overnight  12/15: o/n Patient became tachycardic HR 120s and 10 minutes later O2 sat dropped as low as 89%. Patient suctioned without improvement in O2 sat and tube feeds found in suction catheter. TFs held.  STAT CXR ordered. STAT labs sent. Respiratory therapist called to bedside and patient trach connected to ventilator. After connection to ventilator and further suctioning O2 sat improved to 97% but patient HR remains 120-130s. Upgraded to NSICU for further management. Vancomycin and zosyn started. CTA  chest PE protocol and CTH ordered. Blood cultures sent.      Vital Signs Last 24 Hrs  T(C): 37.6 (14 Dec 2024 22:14), Max: 37.8 (14 Dec 2024 05:06)  T(F): 99.6 (14 Dec 2024 22:14), Max: 100.1 (14 Dec 2024 05:06)  HR: 122 (15 Dec 2024 00:55) (76 - 126)  BP: 131/81 (14 Dec 2024 23:40) (109/78 - 144/99)  BP(mean): 98 (14 Dec 2024 23:40) (89 - 116)  RR: 18 (14 Dec 2024 23:40) (16 - 18)  SpO2: 94% (15 Dec 2024 00:55) (92% - 100%)    Parameters below as of 14 Dec 2024 23:40  Patient On (Oxygen Delivery Method): tracheostomy collar  O2 Flow (L/min): 10  O2 Concentration (%): 40    I&O's Detail    13 Dec 2024 07:01  -  14 Dec 2024 07:00  --------------------------------------------------------  IN:    Enteral Tube Flush: 240 mL    Free Water: 500 mL    Miscellaneous Tube Feedin mL  Total IN: 2030 mL    OUT:    Indwelling Catheter - Urethral (mL): 1825 mL  Total OUT: 1825 mL    Total NET: 205 mL      14 Dec 2024 07:01  -  15 Dec 2024 01:12  --------------------------------------------------------  IN:    Miscellaneous Tube Feedin mL  Total IN: 720 mL    OUT:    Indwelling Catheter - Urethral (mL): 490 mL    Voided (mL): 250 mL  Total OUT: 740 mL    Total NET: -20 mL        I&O's Summary    13 Dec 2024 07:01  -  14 Dec 2024 07:00  --------------------------------------------------------  IN: 2030 mL / OUT: 1825 mL / NET: 205 mL    14 Dec 2024 07:01  -  15 Dec 2024 01:12  --------------------------------------------------------  IN: 720 mL / OUT: 740 mL / NET: -20 mL        PHYSICAL EXAM prior to connection to ventilator:  General: patient seen laying supine in bed in NAD  Neuro: OEs spontaneously, A&Ox0, R hand moves fingers spontaneously,  RLE with some spontaneous toe movement, LLE  withdrawal to noxious, LUE 0/5  HEENT: PERRL, only tracks vertically, +trach collar  Neck: supple  Cardiac: RRR, S1S2  Pulmonary: chest rise symmetric  Abdomen: soft, nontender, nondistended, +PEG  Ext: perfusing well  Skin: warm, dry    LABS:                        11.3   10.83 )-----------( 184      ( 15 Dec 2024 00:01 )             35.2     12-15    139  |  102  |  42[H]  ----------------------------<  135[H]  3.7   |  26  |  1.20    Ca    8.7      15 Dec 2024 00:01  Phos  3.1     12-15  Mg     2.0     12-15      PT/INR - ( 15 Dec 2024 00:07 )   PT: 11.4 sec;   INR: 0.97          PTT - ( 15 Dec 2024 00:07 )  PTT:34.1 sec  Urinalysis Basic - ( 15 Dec 2024 00:01 )    Color: x / Appearance: x / SG: x / pH: x  Gluc: 135 mg/dL / Ketone: x  / Bili: x / Urobili: x   Blood: x / Protein: x / Nitrite: x   Leuk Esterase: x / RBC: x / WBC x   Sq Epi: x / Non Sq Epi: x / Bacteria: x          CAPILLARY BLOOD GLUCOSE      POCT Blood Glucose.: 135 mg/dL (14 Dec 2024 23:59)      Drug Levels: [] N/A    CSF Analysis: [] N/A      Allergies    Allergy Status Unknown    Intolerances      MEDICATIONS:  Antibiotics:  piperacillin/tazobactam IVPB. 3.375 Gram(s) IV Intermittent once  piperacillin/tazobactam IVPB.- 3.375 Gram(s) IV Intermittent once  piperacillin/tazobactam IVPB.. 3.375 Gram(s) IV Intermittent every 8 hours  vancomycin  IVPB 1250 milliGRAM(s) IV Intermittent every 12 hours    Neuro:  acetaminophen   Oral Liquid .. 650 milliGRAM(s) Oral every 6 hours PRN    Anticoagulation:  heparin   Injectable 5000 Unit(s) SubCutaneous every 8 hours    OTHER:  acetylcysteine 10%  Inhalation 4 milliLiter(s) Inhalation every 6 hours  albuterol/ipratropium for Nebulization 3 milliLiter(s) Nebulizer every 6 hours  amLODIPine   Tablet 5 milliGRAM(s) Oral daily  artificial tears (preservative free) Ophthalmic Solution 1 Drop(s) Right EYE every 4 hours  chlorhexidine 0.12% Liquid 15 milliLiter(s) Oral Mucosa every 12 hours  chlorhexidine 2% Cloths 1 Application(s) Topical <User Schedule>  doxazosin 8 milliGRAM(s) Oral at bedtime  erythromycin   Ointment 1 Application(s) Right EYE at bedtime  ofloxacin 0.3% Solution 1 Drop(s) Right EYE four times a day  pantoprazole  Injectable 40 milliGRAM(s) IV Push daily  petrolatum Ophthalmic Ointment 1 Application(s) Both EYES two times a day  polyethylene glycol 3350 17 Gram(s) Oral every 12 hours  senna 2 Tablet(s) Oral every 12 hours  simethicone 80 milliGRAM(s) Chew three times a day    IVF:    CULTURES:    RADIOLOGY & ADDITIONAL TESTS:    AMS    Handoff    MEWS Score    Acute myocardial infarction    CVA (cerebral vascular accident)    Intracerebral hemorrhage of brain stem    Brainstem stroke    Brain stem stroke syndrome    Brain stem hemorrhage    Brain stem stroke syndrome    Hemorrhagic stroke    Brainstem stroke    Encephalopathy acute    Functional quadriplegia    Advanced care planning/counseling discussion    Encounter for palliative care    Pontine hemorrhage    Neurogenic dysphagia    Chronic respiratory failure    Acute kidney injury superimposed on CKD    Acute urinary retention    Hypertensive emergency    Sepsis, unspecified organism    Sepsis    Gram-negative bacteremia    Percutaneous tracheal puncture    Altered mental status examination    EGD, with PEG    AMS    [email or web address]        ASSESSMENT:  43 y/o PMH ?stroke/MI present to [facility] after collapsing at work. Decorticate posturing, vomiting, intubated for airway protection. 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 Transferred to [facility] for further management. s/p trach 10/14. s/p peg 10/21. Re-upgrade to ICU 2/2 desaturation event and suctioning requirements 11/15.     PLAN:  Neuro:  - neuro checks q4/vitals q1h, protected sleep time 10PM-5AM  - pain control: tylenol prn  - vEEG (10/3-)- negative, (10/17-10/19) - negative  - CTH : enlarged pontine hemorrhage, CTH 10/3: stable, CTH 10/25: mostly resolved pontine hemorrhage  - MRI brain 10/12: parenchymal hemorrhage, acute/subacute R cerebellar stroke    - stroke core measures, stroke neuro signed off  - pending CTH 12/15    CV:  - -160  - HTN: hold amlodipine 5mg, hold lisinopril 5mg   - echo () EF 75%    Resp:  - trach to vent, FVS  - pending CTA PE protocol 12/15  - Secretions: duonebs/mucomyst/chest PT q8h   - CTA chest  negative for PE, inc ground glass opacities    GI:  - TF via PEG (placed 10/21 by gen surg)  - bowel regimen, last BM     Renal:  - IVL; FW flushes 250cc q6hrs   - Urinary retenion: Gabriel replaced   - CKD: trend BUN/Cr  - renal US 10/1: echogenicity c/w chronic med renal dz, repeat 10/8: inc renal echogeicity, c/f medical renal disease w increased hydro     Endo:  - A1c 5.4    Heme:  - DVT ppx: SCDs, SQH 5000u q8h   - LE dopplers negative     ID:  - vanc/zosyn started empirically 12/15  - f/u blood cultures 12/15  - last pancx , MRSA swab neg    -  s/p Stenotrophomonas maltophilia PNA: s/p Bactrim (-) s/p Cefepime (-)  - 11/3, (+) sputum for stenotrophomas maltophlia, blood cx (+) klebsiella, cefepime 2gq12 ( - )   - empiric tx: s/p zosyn (11/3-) , s/p vanc  (11/3-)  - S/p Ancef (10/4-10/14) for PNA, and s/p Unasyn (10/18-10/23) +actinobacter baumanii     MISC:  - ophtho consult for keratitis  - erythromycin ointment to rt eye q4hrs, ofloxacin ointment to rt eye QID, artificial tears to rt eye q2hrs, moisture chamber at bedtime    Dispo: ICU status, full code, pending placement and guardianship hearing     D/w Dr. D'Amico and Dr. Tomlin      Assessment:  Present when checked    []  GCS  E   V  M     Heart Failure: []Acute, [] acute on chronic , []chronic  Heart Failure:  [] Diastolic (HFpEF), [] Systolic (HFrEF), []Combined (HFpEF and HFrEF), [] RHF, [] Pulm HTN, [] Other    [] ANIKA, [] ATN, [] AIN, [] other  [] CKD1, [] CKD2, [] CKD 3, [] CKD 4, [] CKD 5, []ESRD    Encephalopathy: [] Metabolic, [] Hepatic, [] toxic, [] Neurological, [] Other    Abnormal Nurtitional Status: [] malnurtition (see nutrition note), [ ]underweight: BMI < 19, [] morbid obesity: BMI >40, [] Cachexia    [] Sepsis  [] hypovolemic shock,[] cardiogenic shock, [] hemorrhagic shock, [] neuogenic shock  [] Acute Respiratory Failure  []Cerebral edema, [] Brain compression/ herniation,   [] Functional quadriplegia  [] Acute blood loss anemia   HPI:  Unknown age male, unknown past medical history (reported stroke and MI by coworkers) presented to [facility] with AMS, Pt was working at [employer] when he was found down by coworkers. EMS called and pt brought to [facility] ED. Intubated, sedated, started on cardene for SBPs in 200s. CT head showed brain stem bleed. Transferred to NSICU for further management.  (30 Sep 2024 12:55)      Subjective:  o/n Patient became tachycardic HR 120s and 10 minutes later O2 sat dropped as low as 89%. Patient suctioned without improvement in O2 sat and tube feeds found in suction catheter. TFs held.  STAT CXR ordered. STAT labs sent. Respiratory therapist called to bedside and patient trach connected to ventilator. After connection to ventilator and further suctioning O2 sat improved to 97% but patient HR remains 120-130s. Upgraded to NSICU for further management. Vancomycin and zosyn started. CTA  chest PE protocol and CTH ordered. Blood cultures sent.  Son, Olivier, updated via telephone. : [place], ID# [id number]    Hospital Course:   : transferred from [facility]. A line placed. Versed dc'd. Roomate Prasanth at bedside, states pt has family in [place], cannot confirm medications or PMH other than stroke and MI. 250cc bolus 3% given. LR switched to NS. hydralazine 25q8 started, 3% started, switched propofol to precedex   10/1: stability CTH done. Added labetalol, started TF. Palliative consulted. ethics consulted to determine surrogate. febrile 103, pan cx sent  10/2: BD 2, GEORGE overnight. TF resumed. Desatt'd to 80s, FiO2 inc. to 50. Fentanyl given, ABG, CXR ordered. Maxxed on precedex, started on propofol for DARIEN -4 - -5. Precedex dc'd. Duonebs, mucomyst, hypertonic added. 3% dc'd. Cardene dc'd. Start vanc/CTX. Increased labetalol 200q8. MRSA negative, dc'd vanc. ETT pulled back 2cm x 2, good positioning after confirmatory chest xray. Ethics attempting to establish HCP with family. Na 159, starting FW 250q6 for range 150-155.   10/3: BD3, GEORGE o/n, neuro stable. Na elevating, FW increased to 300q6. Dc'd bowel reg for diarrhea. vEEG started. SQH 5000q8 tonight.   10/4: BD 4, albumn bolus, incr. LR to 80 2/2 incr. in Cr, LR to 100cc/hr for uptrending Cr. Started 7% hypersal for 48hrs and SL atropine for inline/oral thick secretions. Dc'd CTX and started ancef for MSSA in the sputum. Nephrology consulted for CKD, f/u recs. SBP 170s, given hydralazine 10mg IVP.   10/5: BD5, o/n 10mg IVP hydralazine given for SBP 170s and started on hydralazine 25q8 via OGT. 10mg IV push labetalol for SBP > 160s. RT placed for diarrhea.   10/6: BD6, o/n FW increased to 350q4 per nephrology recs. IV tylenol for temp 100.6, SBp 160s presumed uncomfortable.   10/7: BD7, overnight pancultured for temp 101.8F.   10/8: BD8. GEORGE. Cr bumped. decreased LR to 75cc/hr. Adding simethicone ATC. incr hydralazine 50mgTID. Incr labetalol 300mgTID. Na 145, decreased FWF to 250q6. Start precedex. FENa consistent with intrinsic kidney injury. Pend repeat renal US. Retaining up to 1.3L, bladder scans q6, straight cath PRN  10/9: BD 9. GEORGE overnight. Neuro stable. abd xray for distention w non-specific gas pattern, OGT to LIWS for morning. duonebs/mucomyst to q8 for improving secretions. Changed tube feeds to Jevity 1.5 20cc/hr, low rate due to abdominal distention, nepro dense and more difficult to digest. Tolerating CPAP, confirmed by ABG.   10/10: BD 10. GEORGE overnight. Neuro stable. (+) gabriel for urinary retention on bladder scan. inc TF to goal rate of 40cc/hr. family leaning toward pursuing trach/PEG. 1/2 amp for FS 81.   10/11: BD 11. GEORGE overnight. Neuro stable. Trach/PEG consults placed.   10/12: BD 12. GEORGE overnight. Neuro stable. MRI brain complete.   10/13: BD 13. Increase flomax. Hold SQH after PM dose for trach tm. IVL.   10/14: BD 14. GEORGE overnight, remains on AC/VC. Gabriel placed for urinary retention. Dc'd free water.  S/p trach with pulm. NGT placed and CXR confirmed in good position.   10/15: BD 15, GEORGE ovn. resumed feeds. spiked 101, pan cx sent.   10/16: BD 16. GEORGE ovn. Lokelma 5mg for K+ 5.4. Started vanc q 24/zosyn for empiric PNA coverage, IVF to 100/hr. PEG held for fever.   10/17: BD 17,  ordered serum osm and urine osm for am. Started sinemet for neurostimulation. Increased cardura to 0.8. Started FW 100q4, dc'd IVF. MRSA negative, dc'd vanc. NGT replaced d/t coiling.   10/18: BD 18, GEORGE overnight, neuro stable. Amantadine added for neurostim. zosyn changed to unasyn for acinetobacter baumannii, failed TOV and required SC  10/19: BD 19, GEORGE ovn. cardura 2mg added for retention. labetalol decreased 200q8, hydralazine decreased 25q8. Gabriel replaced.   10/20: BD20, GEORGE overnight. NGT dislodged, replaced. PEG tomorrow w/ gen surg, FW increased to 150q4 and labetalol decreased to 100q8, lokelma given for hyperkalemia.   10/21: BD 21. POD0 PEG placement with Gen surg. decr labetolol to 50q8, incr. cardura to 0.4, started lokelma and phoslo, dc gabriel POD0 PEG placement with Gen surg.  10/22: BD 22. Plan to start TF today via PEG. dc labetalol, Following ophtho recs. Increased apnea settings - found to be in cheyne-moe respiration. CPAP 5/5.  10/23: BD 23. hydralazine d/c'd, trach collar trial today. Rectal tube placed at 6am.  10/24: BD24, o/n lokelma held due to diarrhea. Free water 100q6 resumed. dc'd tamsulosin, amantadine. Incr'd cardura to 8mg qhs. Dc'd FW. Switched jevity to nepro. gabriel placed for high urine output. Started SL atropine for oral secretions. Dc'd free water.  10/25: BD25, o/n decreased suctioning requirements to > q4hrs, GEORGE. Cr improving, cont phoslo, lokelma held at this time. Gabriel placed yest, cont. Tolerating trach collar. Given 500cc plasmalyte bolus for ANIKA. Dc'd sinemet.   10/26: BD26, o/n resumed lokelma 5mg daily and resumed 100cc free water q6hrs. Change in neuro status with new right pupillary dilation with anisocoria (right pupil 6mm fixed and left pupil 3mm briskly reactive). Given 23.4% NaCl bullet, taken for emergent CTH showing mostly resolved pontine hemorrhage, continued brainstem hypodensity likely edema d/t hemorrhage, no new hemorrhage or infarct, no herniation, mild increase in size of left lateral ventricle. Vitals remaine stable. Na goal > 140.   10/27: BD27, o/n GEORGE.Neuro stable. Pend stepdown with airway bed.   10/28: BD 28. GEORGE overnight. Neuro stable. Miralax ordered. Gabriel removed, pending TOV.  10/29: BD 29. GEORGE o/n. Given 2L NS over 8 hrs for increased BUN/Cr ratio. Gabriel placed for frequent straight cath.   10/30: BD 30.   10/31: BD 31. GEORGE overnight. Na 149, increased free water to 200q6. 1L NS for uptrending BUN.   : BD 32. GEORGE overnight. Given 1L NS for dehydration. Na 146, increased FW to 250q6.   : BD 33 GEORGE overnight, neuro stable, given 500cc bolus for net negative status and tachycardia   11/3: BD 34, GEORGE overnight, neuro stable. Patient remains tachycardic, EKG showing sinus tachycardia, given additional 500cc NS bolus. Febrile to 101.9F, pan cultured (without UA), CXR WNL, given tylenol.   : BD 35, GEORGE overnight, neuro stable. Given 1L NS for tachycardia. sputum (+) for stenotropohomas maltophilia.   : BD 36 GEORGE overnight, neuro stable. Vancomycin dc'd. Chest PT BID. ID consulted, cont zosyn.  : BD 37. blood cx + klebsiella dc zosyn changed to cefepime, CTAP ordered, rpt blood cx sent.    : BD 38. Pending CT A/P, given 250cc bolus and starting maintenance fluids overnight. Pending CT A/P after bolus   : BD 39. CT CAP negative for infection.   : BD 40. GEORGE overnight.  11/10: BD 41. GEORGE overnight. desat to 85 on trach collar, O2 inc to 10L and 100%, O2 sat inc to 95. pt tachy to 110s, euvolemic. given tylenol. ABG and CXR ordered. spiked fever, pancultured, RVP negative. AM ABG w pO2 79, rpt w pO2 79. pt appears comfortable, satting 94%.   : BD 42. GEORGE overnight. pt became tachy to 130s, desat to 90 on 100% FiO2 and 10L. suctioned, (+) productive cough. temp 101.4, given 1g IV tylenol and 500cc NS bolus for euvolemia. fever and HR downtrending. LE dopplers negative for dvt  : BD 43, GEORGE ovn, fever and HR downtrending, satting 97% 70% FIO2  : BD 44, GEORGE ovn. started standing tylenol x24 hours for tachycardia. desat to 80s, o2 increased. CXR stable, pending CTA PE protocol.   : BD 45, GEORGE overnight, neuro stable. resp therapy dec FiO2 to 70%.   11/15: BD 44, GEORGE overnight, neuro stable.  Rapid called for desaturation 30s, tachycardic 140s. Patient bagged, 100% fio2, heavily suctioned. CXR/POCUS unremarkable. ABG c/w desaturation. WBC 14.71. Afebrile. O2 improved to 90s and patient upgraded to ICU. ABG paO2 30s improved to 89 on vent. IV Tylenol x 1, sputum sent. Start protonix while o-n vent.   : BD 47. POCUS showed collapsable IVCF, given 1L bolus. Vanco/Cefpime added empriic for PNA, NGT feeds restarted. MRSA swab neg, Vanc DC'd.   : BD 48. GEORGE overnight. 1l bolus for tachycardia. Spiked to 101, cultured. 500cc bolus for tachycardia, tachy to 148 given 25mcg fentanyl, 250cc albumin, 1.5L bolus. 5 IV lopressor with response HR to 100s. +Stenotrophomonas on sputum cx.   : BD 49. GEORGE overnight. Consulted ID, cefepime switched to bactrim x7days. Started hydrochlorothiazine 12.5mg daily.  : BD 50. Tachy 120s, given tylenol and 500cc NS. Tolerating 5/5, switched to TCx. Holding phos binder. D/c Bactrim. D/c gabriel, f/u TOV. Dc'd PPI.   : BD 51. GEORGE ovn. 1600 satting low 90s, mildly tachy to 110s, afebrile, RR wnl. O2 improved to mid 90s while inc O2 to 100% on TCx. CPAP 5/5 placed back on.  : BD 52, GEORGE ovn, tolerating CPAP 5/5. Switch to trach collar during the day if tolerating well. HCTZ held for Cr bump, straight cath frequence increased to q4  : BD 53, GEORGE ovn. Resumed phoslo. Gabriel placed. Resumed HCTZ.   : BD 54. Holding tylenol in setting of possible fever, will require pan cx if febrile. Cr improved today. Cont CPAP. Bowel regimen held i/s/o diarrhea. FOBT negative.  : BD 55. GEORGE overnight. Neuro stable. HCTZ dc'ed, started lisinopril 5. Lokelma dc'ed for K 3.7.   : BD 56, GEORGE overnight. Neuro stable. dc'd lisinopril 5mg. Gabriel dc'd. TOV. 1545 noted to be hypotensive, MAP 50, in supine position on chair, HR 60s, afebrile, O2 96%. Given 1L cc NS bolus, placed back on bed in reverse trendelenberg, improved to map of 66. Neostick at bedside. Vitals check q1h. Dc'ed amlodipine. Failed TOV, bladder scan q6, sc prn. Added back Senna.   : BD 57, GEORGE overnight. Neuro stable. Dc'd phoslo.   : BD 58, GEORGE overnight. Neuro stable.    : BD 59. Gabriel replaced.   : GEORGE.  : GEORGE, neuro stable.   : BD 62, GEORGE overnight  : BD 63, GEORGE overnight.; Given 1L bolus of LR for uptrending BUN/Cr.  12/3: BD 64. Reinstated eye gtt/moisture chamber given increased Rt eye injection  : BD 65. GEORGE overnight. Attempted to speak with ophtho regarding eyelid weight/closure but no answer, full mailbox.   : BD 66, GEORGE overnight, bowel regimen increased and had BM.   : BD 65, GEORGE overnight, neuro stable.  : GEORGE overnight, neuro stable. /110s, given x1 hydralazine 10 mg IVP. Restarted home amlodipine 5mg.  : GEORGE. OOB to chair.     : GEORGE, mucomyst added for thick secretions, simethicone for abd distension, abd xray with stool burden, increased bowel regimen.   : GEORGE overnight.   : GEORGE overnight.   : GEORGE overnight  12/15: o/n Patient became tachycardic HR 120s and 10 minutes later O2 sat dropped as low as 89%. Patient suctioned without improvement in O2 sat and tube feeds found in suction catheter. TFs held.  STAT CXR ordered. STAT labs sent. Respiratory therapist called to bedside and patient trach connected to ventilator. After connection to ventilator and further suctioning O2 sat improved to 97% but patient HR remains 120-130s. Upgraded to NSICU for further management. Vancomycin and zosyn started. CTA  chest PE protocol and CTH ordered. Blood cultures sent.      Vital Signs Last 24 Hrs  T(C): 37.6 (14 Dec 2024 22:14), Max: 37.8 (14 Dec 2024 05:06)  T(F): 99.6 (14 Dec 2024 22:14), Max: 100.1 (14 Dec 2024 05:06)  HR: 122 (15 Dec 2024 00:55) (76 - 126)  BP: 131/81 (14 Dec 2024 23:40) (109/78 - 144/99)  BP(mean): 98 (14 Dec 2024 23:40) (89 - 116)  RR: 18 (14 Dec 2024 23:40) (16 - 18)  SpO2: 94% (15 Dec 2024 00:55) (92% - 100%)    Parameters below as of 14 Dec 2024 23:40  Patient On (Oxygen Delivery Method): tracheostomy collar  O2 Flow (L/min): 10  O2 Concentration (%): 40    I&O's Detail    13 Dec 2024 07:01  -  14 Dec 2024 07:00  --------------------------------------------------------  IN:    Enteral Tube Flush: 240 mL    Free Water: 500 mL    Miscellaneous Tube Feedin mL  Total IN: 2030 mL    OUT:    Indwelling Catheter - Urethral (mL): 1825 mL  Total OUT: 1825 mL    Total NET: 205 mL      14 Dec 2024 07:01  -  15 Dec 2024 01:12  --------------------------------------------------------  IN:    Miscellaneous Tube Feedin mL  Total IN: 720 mL    OUT:    Indwelling Catheter - Urethral (mL): 490 mL    Voided (mL): 250 mL  Total OUT: 740 mL    Total NET: -20 mL        I&O's Summary    13 Dec 2024 07:01  -  14 Dec 2024 07:00  --------------------------------------------------------  IN: 2030 mL / OUT: 1825 mL / NET: 205 mL    14 Dec 2024 07:01  -  15 Dec 2024 01:12  --------------------------------------------------------  IN: 720 mL / OUT: 740 mL / NET: -20 mL        PHYSICAL EXAM prior to connection to ventilator:  General: patient seen laying supine in bed in NAD  Neuro: OEs spontaneously, A&Ox0, R hand moves fingers spontaneously,  RLE with some spontaneous toe movement, LLE  withdrawal to noxious, LUE 0/5  HEENT: PERRL, only tracks vertically, +trach collar  Neck: supple  Cardiac: RRR, S1S2  Pulmonary: chest rise symmetric  Abdomen: soft, nontender, nondistended, +PEG  Ext: perfusing well  Skin: warm, dry    LABS:                        11.3   10.83 )-----------( 184      ( 15 Dec 2024 00:01 )             35.2     12-15    139  |  102  |  42[H]  ----------------------------<  135[H]  3.7   |  26  |  1.20    Ca    8.7      15 Dec 2024 00:01  Phos  3.1     12-15  Mg     2.0     12-15      PT/INR - ( 15 Dec 2024 00:07 )   PT: 11.4 sec;   INR: 0.97          PTT - ( 15 Dec 2024 00:07 )  PTT:34.1 sec  Urinalysis Basic - ( 15 Dec 2024 00:01 )    Color: x / Appearance: x / SG: x / pH: x  Gluc: 135 mg/dL / Ketone: x  / Bili: x / Urobili: x   Blood: x / Protein: x / Nitrite: x   Leuk Esterase: x / RBC: x / WBC x   Sq Epi: x / Non Sq Epi: x / Bacteria: x          CAPILLARY BLOOD GLUCOSE      POCT Blood Glucose.: 135 mg/dL (14 Dec 2024 23:59)      Drug Levels: [] N/A    CSF Analysis: [] N/A      Allergies    Allergy Status Unknown    Intolerances      MEDICATIONS:  Antibiotics:  piperacillin/tazobactam IVPB. 3.375 Gram(s) IV Intermittent once  piperacillin/tazobactam IVPB.- 3.375 Gram(s) IV Intermittent once  piperacillin/tazobactam IVPB.. 3.375 Gram(s) IV Intermittent every 8 hours  vancomycin  IVPB 1250 milliGRAM(s) IV Intermittent every 12 hours    Neuro:  acetaminophen   Oral Liquid .. 650 milliGRAM(s) Oral every 6 hours PRN    Anticoagulation:  heparin   Injectable 5000 Unit(s) SubCutaneous every 8 hours    OTHER:  acetylcysteine 10%  Inhalation 4 milliLiter(s) Inhalation every 6 hours  albuterol/ipratropium for Nebulization 3 milliLiter(s) Nebulizer every 6 hours  amLODIPine   Tablet 5 milliGRAM(s) Oral daily  artificial tears (preservative free) Ophthalmic Solution 1 Drop(s) Right EYE every 4 hours  chlorhexidine 0.12% Liquid 15 milliLiter(s) Oral Mucosa every 12 hours  chlorhexidine 2% Cloths 1 Application(s) Topical <User Schedule>  doxazosin 8 milliGRAM(s) Oral at bedtime  erythromycin   Ointment 1 Application(s) Right EYE at bedtime  ofloxacin 0.3% Solution 1 Drop(s) Right EYE four times a day  pantoprazole  Injectable 40 milliGRAM(s) IV Push daily  petrolatum Ophthalmic Ointment 1 Application(s) Both EYES two times a day  polyethylene glycol 3350 17 Gram(s) Oral every 12 hours  senna 2 Tablet(s) Oral every 12 hours  simethicone 80 milliGRAM(s) Chew three times a day    IVF:    CULTURES:    RADIOLOGY & ADDITIONAL TESTS:    AMS    Handoff    MEWS Score    Acute myocardial infarction    CVA (cerebral vascular accident)    Intracerebral hemorrhage of brain stem    Brainstem stroke    Brain stem stroke syndrome    Brain stem hemorrhage    Brain stem stroke syndrome    Hemorrhagic stroke    Brainstem stroke    Encephalopathy acute    Functional quadriplegia    Advanced care planning/counseling discussion    Encounter for palliative care    Pontine hemorrhage    Neurogenic dysphagia    Chronic respiratory failure    Acute kidney injury superimposed on CKD    Acute urinary retention    Hypertensive emergency    Sepsis, unspecified organism    Sepsis    Gram-negative bacteremia    Percutaneous tracheal puncture    Altered mental status examination    EGD, with PEG    AMS    [email or web address]        ASSESSMENT:  45 y/o PMH ?stroke/MI present to [facility] after collapsing at work. Decorticate posturing, vomiting, intubated for airway protection. 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 Transferred to [facility] for further management. s/p trach 10/14. s/p peg 10/21. Re-upgrade to ICU 2/2 desaturation event and suctioning requirements 11/15.     PLAN:  Neuro:  - neuro checks q4/vitals q1h, protected sleep time 10PM-5AM  - pain control: tylenol prn  - vEEG (10/3-)- negative, (10/17-10/19) - negative  - CTH : enlarged pontine hemorrhage, CTH 10/3: stable, CTH 10/25: mostly resolved pontine hemorrhage  - MRI brain 10/12: parenchymal hemorrhage, acute/subacute R cerebellar stroke    - stroke core measures, stroke neuro signed off  - pending CTH 12/15    CV:  - -160  - HTN: hold amlodipine 5mg, hold lisinopril 5mg   - echo () EF 75%    Resp:  - trach to vent, FVS  - pending CTA PE protocol 12/15  - Secretions: duonebs/mucomyst/chest PT q8h   - CTA chest  negative for PE, inc ground glass opacities    GI:  - NPO, PEG (placed 10/21 by gen surg)  - bowel regimen, last BM   - protonix while on ventilator    Renal:  - IVF while NPO; FW flushes 250cc q6hrs   - Urinary retenion: Gabriel replaced   - CKD: trend BUN/Cr  - renal US 10/1: echogenicity c/w chronic med renal dz, repeat 10/8: inc renal echogeicity, c/f medical renal disease w increased hydro     Endo:  - A1c 5.4    Heme:  - DVT ppx: SCDs, SQH 5000u q8h   - LE dopplers negative     ID:  - vanc/zosyn started empirically 12/15  - f/u blood cultures 12/15  - last pancx , MRSA swab neg    -  s/p Stenotrophomonas maltophilia PNA: s/p Bactrim (-) s/p Cefepime (-)  - 11/3, (+) sputum for stenotrophomas maltophlia, blood cx (+) klebsiella, cefepime 2gq12 ( - 11/12)   - empiric tx: s/p zosyn (11/3-) , s/p vanc  (11/3-)  - S/p Ancef (10/4-10/14) for PNA, and s/p Unasyn (10/18-10/23) +actinobacter baumanii     MISC:  - ophtho consult for keratitis  - erythromycin ointment to rt eye q4hrs, ofloxacin ointment to rt eye QID, artificial tears to rt eye q2hrs, moisture chamber at bedtime    Dispo: ICU status, full code, pending placement and guardianship hearing     D/w Dr. D'Amico and Dr. Tomlin      Assessment:  Present when checked    []  GCS  E   V  M     Heart Failure: []Acute, [] acute on chronic , []chronic  Heart Failure:  [] Diastolic (HFpEF), [] Systolic (HFrEF), []Combined (HFpEF and HFrEF), [] RHF, [] Pulm HTN, [] Other    [] ANIKA, [] ATN, [] AIN, [] other  [] CKD1, [] CKD2, [] CKD 3, [] CKD 4, [] CKD 5, []ESRD    Encephalopathy: [] Metabolic, [] Hepatic, [] toxic, [] Neurological, [] Other    Abnormal Nurtitional Status: [] malnurtition (see nutrition note), [ ]underweight: BMI < 19, [] morbid obesity: BMI >40, [] Cachexia    [] Sepsis  [] hypovolemic shock,[] cardiogenic shock, [] hemorrhagic shock, [] neuogenic shock  [] Acute Respiratory Failure  []Cerebral edema, [] Brain compression/ herniation,   [] Functional quadriplegia  [] Acute blood loss anemia

## 2024-12-15 NOTE — PROCEDURE NOTE - NSPROCDETAILS_GEN_ALL_CORE
location identified, draped/prepped, sterile technique used, needle inserted/introduced location identified, draped/prepped, sterile technique used, needle inserted/introduced/hemostasis with direct pressure, dressing applied/all materials/supplies accounted for at end of procedure

## 2024-12-15 NOTE — PROGRESS NOTE ADULT - ASSESSMENT
ASSESSMENT AND PLAN  43 y/o PMH ?stroke/MI present to [facility] after collapsing at work. Decorticate posturing, vomiting, intubated for airway protection. 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 Transferred to [facility] for further management. s/p trach 10/14. s/p peg 10/21. Re-upgrade to ICU 2/2 desaturation event and suctioning requirements 11/15. Re-upgrade again on 12/15 for desaturation event.      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 hypovolemia?/hypoxia/respiratory distress?)  - -160  - holding amlodipine 5mg, hold lisinopril 5mg   - echo (9/30) EF 75%  - CTA chest PE study tonight to rule-out PE  - troponin-HS  - telemetry monitoring     Resp: acute hypoxic respiratory failure requiring mechanical ventilation ---> trach to vent  ABG - ( 15 Dec 2024 00:16 )  pH, Arterial: 7.46  pH, Blood: x     /  pCO2: 39    /  pO2: 49    / HCO3: 28    / Base Excess: 3.7   /  SaO2: 88.4      - Trach collar ---> vent   - full vent support/lung protective ventilation   - chest PT/duonebs, aggressive pulmonary toilette   - chest x-ray reviewed ----> left side basilar atelectasis? consolidation?      - CTA chest PA study     GI - episode of vomiting with aspiration   - TF via PEG (placed 10/21 by gen surg) ---> holding feeding given vomiting  - bowel regimen, last BM 12/14  - PPI while intubated     Renal:  ANIKA on CKD ---> improving; retention   12-15    139  |  102  |  42[H]  ----------------------------<  135[H]  3.7   |  26  |  1.20    Ca    8.7      15 Dec 2024 00:01  Phos  3.1     12-15  Mg     2.0     12-15    - IVL; FW flushes 250cc q6hrs   - Urinary retenion: Vuong replaced 11/28  - CKD: trend BUN/Cr  - renal US 10/1: echogenicity c/w chronic med renal dz, repeat 10/8: inc renal echogenicity c/f medical renal disease w increased hydro     Endo:  - A1c 5.4  - avoid hypoglycemia     Heme:                        11.3   10.83 )-----------( 184      ( 15 Dec 2024 00:01 )             35.2     - DVT ppx: SCDs, SQH 5000u q8h   - LE dopplers negative 11/11  - CTA chest PA study     ID - tachycardic/hypoxic, aspiration event, c/w aspiration PNA + leukocytosis (10.83)  - start Vanc and Zosyn ----> send sputum/blood cultures   - s/p Stenotrophomonas maltophilia PNA: s/p Bactrim (11/18-11/19) s/p Cefepime (11/16-11/18)  - 11/3, (+) sputum for stenotrophomas maltophlia, blood cx (+) klebsiella, cefepime 2gq12 (11/6 - 11/12)   - empiric tx: s/p zosyn (11/3-11/6) , s/p vanc  (11/3-11/5)  - S/p Ancef (10/4-10/14) for PNA, and s/p Unasyn (10/18-10/23) +actinobacter baumanii     MISC:  - Ophthalmology consulted for keratitis  - Erythromycin ointment to rt eye q4hrs, ofloxacin ointment to rt eye QID, artificial tears to rt eye q2hrs, moisture chamber at bedtime    Dispo: Upgraded to NeuroICU  ----> overall pending placement and guardianship hearing     Jazlyn Tomlin MD,   NeuroICU Attending    ASSESSMENT AND PLAN  43 y/o PMH ?stroke/MI present to [facility] after collapsing at work. Decorticate posturing, vomiting, intubated for airway protection. 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 Transferred to [facility] for further management. s/p trach 10/14. s/p peg 10/21. Re-upgrade to ICU 2/2 desaturation event and suctioning requirements 11/15. Re-upgrade again on 12/15 for desaturation event.      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 hypovolemia?/hypoxia/respiratory distress?)  - -160  - holding amlodipine 5mg, hold lisinopril 5mg   - echo (9/30) EF 75%  - CTA chest PE study tonight to rule-out PE  - troponin-HS  - telemetry monitoring     Resp: acute hypoxic respiratory failure requiring mechanical ventilation ---> trach to vent  ABG - ( 15 Dec 2024 00:16 )  pH, Arterial: 7.46  pH, Blood: x     /  pCO2: 39    /  pO2: 49    / HCO3: 28    / Base Excess: 3.7   /  SaO2: 88.4      - Trach collar ---> vent   - full vent support/lung protective ventilation   - chest PT/duonebs, aggressive pulmonary toilette   - chest x-ray reviewed ----> left side basilar atelectasis? consolidation?      - CTA chest PA study     GI - episode of vomiting with aspiration   - TF via PEG (placed 10/21 by gen surg) ---> holding feeding given vomiting  - bowel regimen, last BM 12/14  - PPI while intubated     Renal:  ANIKA on CKD ---> improving; retention   12-15    139  |  102  |  42[H]  ----------------------------<  135[H]  3.7   |  26  |  1.20    Ca    8.7      15 Dec 2024 00:01  Phos  3.1     12-15  Mg     2.0     12-15    - IVL; FW flushes 250cc q6hrs   - Urinary retenion: Vuong replaced 11/28  - CKD: trend BUN/Cr  - renal US 10/1: echogenicity c/w chronic med renal dz, repeat 10/8: inc renal echogenicity c/f medical renal disease w increased hydro     Endo:  - A1c 5.4  - avoid hypoglycemia     Heme:                        11.3   10.83 )-----------( 184      ( 15 Dec 2024 00:01 )             35.2     - DVT ppx: SCDs, SQH 5000u q8h   - LE dopplers negative 11/11  - CTA chest PA study     ID - tachycardic/hypoxic, aspiration event, c/w aspiration PNA + leukocytosis (10.83)  - start Vanc and Zosyn ----> send sputum/blood cultures   - s/p Stenotrophomonas maltophilia PNA: s/p Bactrim (11/18-11/19) s/p Cefepime (11/16-11/18)  - 11/3, (+) sputum for stenotrophomas maltophlia, blood cx (+) klebsiella, cefepime 2gq12 (11/6 - 11/12)   - empiric tx: s/p zosyn (11/3-11/6) , s/p vanc  (11/3-11/5)  - S/p Ancef (10/4-10/14) for PNA, and s/p Unasyn (10/18-10/23) +actinobacter baumanii     MISC:  - Ophthalmology consulted for keratitis  - Erythromycin ointment to rt eye q4hrs, ofloxacin ointment to rt eye QID, artificial tears to rt eye q2hrs, moisture chamber at bedtime    Dispo: Upgraded to NeuroICU  ----> overall pending placement and guardianship hearing      ASSESSMENT AND PLAN  43 y/o PMH ?stroke/MI   Admitted with 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   S/P trach 10/14. s/p peg 10/21. Re-upgrade to ICU 2/2 desaturation event and suctioning requirements 11/15. Re-upgrade again on 12/15 for desaturation event.      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 PT/OT once stable from respiratory standpoint    CV; Tachycardia in the setting of hypoxia - (PE? hypovolemia?/hypoxia/respiratory distress?). RESOLVED.  -160  Holding amlodipine 5mg, hold lisinopril 5mg   TTE (9/30) EF 75%  CTA chest PE no PE in major vessels  Troponin; 51  Telemetry monitoring.  Q1 vitals    RESPIRATION: Acute hypoxic respiratory failure requiring mechanical ventilation. Trach to vent  Trach collar to vent   Full vent support/lung protective ventilation   Aggressive chest PT/duonebs, aggressive pulmonary toilet  Chest x-ray reviewed: left side basilar atelectasis? consolidation?    CTA chest PA study negative for PE in major vessel  Consider pulm consult if difficulty weaning and for pulm optimization    GI.; Episode of vomiting with aspiration   TF via PEG (placed 10/21 by gen surg). Holding feeding given vomiting. NPO  Bowel regimen, last BM 12/14  PPI while intubated   Abdominal XRAY    RENAL:  ANIKA on CKD ---> improving; urine retention   IVL; FW flushes 250cc q6hrs   Urinary retention: Gabriel replaced 11/28. Replace gabriel catheter.  CKD: Trend BUN/Cr  Renal US 10/1: echogenicity c/w chronic med renal disease, repeat 10/8. Increased renal echogenicity consistent with medical renal disease with increased hydronephrosis    ENDO:  A1c 5.4  Avoid hypoglycemia. Fingersticks, ISS    HEME:  DVT ppx: SCDs, SQH 5000u q8h   LE dopplers negative 11/11  CTA chest PA study negative for PE    ID: Tachycardic/hypoxic, aspiration event. Suspect aspiration PNA + leukocytosis (10.83)  On Vanc and Zosyn  Pending results of sputum/blood cultures   S/P stenotrophomonas maltophilia PNA: s/p Bactrim (11/18-11/19) s/p Cefepime (11/16-11/18)  11/3, (+) sputum for stenotrophomas maltophlia, blood cx (+) klebsiella, cefepime 2gq12 (11/6 - 11/12)   Empiric tx: s/p zosyn (11/3-11/6) , s/p vanc  (11/3-11/5)  S/P Ancef (10/4-10/14) for PNA, and s/p Unasyn (10/18-10/23) +actinobacter baumanii     MISC:  Ophthalmology consulted for keratitis  Erythromycin ointment to rt eye q4hrs, ofloxacin ointment to rt eye QID, artificial tears to rt eye q2hrs, moisture chamber at bedtime    MISC:    SOCIAL/FAMILY:  [] awaiting [x] updated at bedside [] family meeting    CODE STATUS:  [x] Full Code [] DNR [] DNI [] Palliative/Comfort Care    DISPOSITION:  [x] ICU [] Stroke Unit [] Floor [] EMU [] RCU [] PCU    Time spent: 60 critical care minutes

## 2024-12-16 ENCOUNTER — RESULT REVIEW (OUTPATIENT)
Age: 44
End: 2024-12-16

## 2024-12-16 LAB
ANION GAP SERPL CALC-SCNC: 10 MMOL/L — SIGNIFICANT CHANGE UP (ref 5–17)
BUN SERPL-MCNC: 36 MG/DL — HIGH (ref 7–23)
CALCIUM SERPL-MCNC: 8.7 MG/DL — SIGNIFICANT CHANGE UP (ref 8.4–10.5)
CHLORIDE SERPL-SCNC: 105 MMOL/L — SIGNIFICANT CHANGE UP (ref 96–108)
CO2 SERPL-SCNC: 24 MMOL/L — SIGNIFICANT CHANGE UP (ref 22–31)
CREAT SERPL-MCNC: 1.29 MG/DL — SIGNIFICANT CHANGE UP (ref 0.5–1.3)
EGFR: 69 ML/MIN/1.73M2 — SIGNIFICANT CHANGE UP
EGFR: 69 ML/MIN/1.73M2 — SIGNIFICANT CHANGE UP
GLUCOSE SERPL-MCNC: 100 MG/DL — HIGH (ref 70–99)
HCT VFR BLD CALC: 30.9 % — LOW (ref 39–50)
HGB BLD-MCNC: 9.9 G/DL — LOW (ref 13–17)
MAGNESIUM SERPL-MCNC: 2.1 MG/DL — SIGNIFICANT CHANGE UP (ref 1.6–2.6)
MCHC RBC-ENTMCNC: 29.5 PG — SIGNIFICANT CHANGE UP (ref 27–34)
MCHC RBC-ENTMCNC: 32 G/DL — SIGNIFICANT CHANGE UP (ref 32–36)
MCV RBC AUTO: 92 FL — SIGNIFICANT CHANGE UP (ref 80–100)
NRBC # BLD: 0 /100 WBCS — SIGNIFICANT CHANGE UP (ref 0–0)
NRBC BLD-RTO: 0 /100 WBCS — SIGNIFICANT CHANGE UP (ref 0–0)
PHOSPHATE SERPL-MCNC: 4.3 MG/DL — SIGNIFICANT CHANGE UP (ref 2.5–4.5)
PLATELET # BLD AUTO: 174 K/UL — SIGNIFICANT CHANGE UP (ref 150–400)
POTASSIUM SERPL-MCNC: 3.6 MMOL/L — SIGNIFICANT CHANGE UP (ref 3.5–5.3)
POTASSIUM SERPL-SCNC: 3.6 MMOL/L — SIGNIFICANT CHANGE UP (ref 3.5–5.3)
RBC # BLD: 3.36 M/UL — LOW (ref 4.2–5.8)
RBC # FLD: 14.9 % — HIGH (ref 10.3–14.5)
SODIUM SERPL-SCNC: 139 MMOL/L — SIGNIFICANT CHANGE UP (ref 135–145)
TROPONIN T, HIGH SENSITIVITY RESULT: 67 NG/L — CRITICAL HIGH (ref 0–51)
TROPONIN T, HIGH SENSITIVITY RESULT: 75 NG/L — CRITICAL HIGH (ref 0–51)
VANCOMYCIN TROUGH SERPL-MCNC: 37.3 UG/ML — CRITICAL HIGH (ref 10–20)
WBC # BLD: 10.62 K/UL — HIGH (ref 3.8–10.5)
WBC # FLD AUTO: 10.62 K/UL — HIGH (ref 3.8–10.5)

## 2024-12-16 PROCEDURE — 99291 CRITICAL CARE FIRST HOUR: CPT

## 2024-12-16 PROCEDURE — 93306 TTE W/DOPPLER COMPLETE: CPT | Mod: 26

## 2024-12-16 PROCEDURE — 93970 EXTREMITY STUDY: CPT | Mod: 26

## 2024-12-16 RX ORDER — AMLODIPINE BESYLATE 10 MG/1
5 TABLET ORAL DAILY
Refills: 0 | Status: DISCONTINUED | OUTPATIENT
Start: 2024-12-16 | End: 2025-02-15

## 2024-12-16 RX ORDER — SODIUM CHLORIDE 0.65 %
1 AEROSOL, SPRAY (ML) NASAL
Refills: 0 | Status: DISCONTINUED | OUTPATIENT
Start: 2024-12-16 | End: 2025-03-13

## 2024-12-16 RX ORDER — FLUTICASONE PROPIONATE 50 UG/1
1 SPRAY, METERED NASAL
Refills: 0 | Status: DISCONTINUED | OUTPATIENT
Start: 2024-12-16 | End: 2025-03-13

## 2024-12-16 RX ADMIN — POLYETHYLENE GLYCOL 3350 17 GRAM(S): 17 POWDER, FOR SOLUTION ORAL at 19:36

## 2024-12-16 RX ADMIN — Medication 1 APPLICATION(S): at 05:42

## 2024-12-16 RX ADMIN — POLYETHYLENE GLYCOL 3350 17 GRAM(S): 17 POWDER, FOR SOLUTION ORAL at 05:45

## 2024-12-16 RX ADMIN — Medication 80 MILLIGRAM(S): at 05:45

## 2024-12-16 RX ADMIN — IPRATROPIUM BROMIDE AND ALBUTEROL SULFATE 3 MILLILITER(S): .5; 2.5 SOLUTION RESPIRATORY (INHALATION) at 05:57

## 2024-12-16 RX ADMIN — IPRATROPIUM BROMIDE AND ALBUTEROL SULFATE 3 MILLILITER(S): .5; 2.5 SOLUTION RESPIRATORY (INHALATION) at 23:53

## 2024-12-16 RX ADMIN — HEPARIN SODIUM 5000 UNIT(S): 1000 INJECTION INTRAVENOUS; SUBCUTANEOUS at 13:43

## 2024-12-16 RX ADMIN — Medication 1 DROP(S): at 12:46

## 2024-12-16 RX ADMIN — HEPARIN SODIUM 5000 UNIT(S): 1000 INJECTION INTRAVENOUS; SUBCUTANEOUS at 21:11

## 2024-12-16 RX ADMIN — OFLOXACIN 1 DROP(S): 3 SOLUTION OPHTHALMIC at 12:34

## 2024-12-16 RX ADMIN — OFLOXACIN 1 DROP(S): 3 SOLUTION OPHTHALMIC at 19:36

## 2024-12-16 RX ADMIN — Medication 1 DROP(S): at 07:02

## 2024-12-16 RX ADMIN — IPRATROPIUM BROMIDE AND ALBUTEROL SULFATE 3 MILLILITER(S): .5; 2.5 SOLUTION RESPIRATORY (INHALATION) at 16:19

## 2024-12-16 RX ADMIN — OFLOXACIN 1 DROP(S): 3 SOLUTION OPHTHALMIC at 00:02

## 2024-12-16 RX ADMIN — Medication 166.67 MILLIGRAM(S): at 05:46

## 2024-12-16 RX ADMIN — IPRATROPIUM BROMIDE AND ALBUTEROL SULFATE 3 MILLILITER(S): .5; 2.5 SOLUTION RESPIRATORY (INHALATION) at 11:14

## 2024-12-16 RX ADMIN — AMLODIPINE BESYLATE 5 MILLIGRAM(S): 10 TABLET ORAL at 05:46

## 2024-12-16 RX ADMIN — Medication 15 MILLILITER(S): at 18:42

## 2024-12-16 RX ADMIN — Medication 2 TABLET(S): at 05:45

## 2024-12-16 RX ADMIN — Medication 1 APPLICATION(S): at 05:39

## 2024-12-16 RX ADMIN — ACETYLCYSTEINE 4 MILLILITER(S): 200 INHALANT RESPIRATORY (INHALATION) at 16:19

## 2024-12-16 RX ADMIN — Medication 1 DROP(S): at 19:37

## 2024-12-16 RX ADMIN — OFLOXACIN 1 DROP(S): 3 SOLUTION OPHTHALMIC at 05:39

## 2024-12-16 RX ADMIN — Medication 2 TABLET(S): at 18:42

## 2024-12-16 RX ADMIN — DOXAZOSIN MESYLATE 8 MILLIGRAM(S): 8 TABLET ORAL at 21:11

## 2024-12-16 RX ADMIN — Medication 1 DROP(S): at 00:01

## 2024-12-16 RX ADMIN — Medication 25 GRAM(S): at 09:59

## 2024-12-16 RX ADMIN — HEPARIN SODIUM 5000 UNIT(S): 1000 INJECTION INTRAVENOUS; SUBCUTANEOUS at 05:46

## 2024-12-16 RX ADMIN — ACETYLCYSTEINE 4 MILLILITER(S): 200 INHALANT RESPIRATORY (INHALATION) at 23:53

## 2024-12-16 RX ADMIN — FLUTICASONE PROPIONATE 1 SPRAY(S): 50 SPRAY, METERED NASAL at 18:43

## 2024-12-16 RX ADMIN — Medication 25 GRAM(S): at 18:42

## 2024-12-16 RX ADMIN — Medication 15 MILLILITER(S): at 05:45

## 2024-12-16 RX ADMIN — Medication 1 DROP(S): at 04:44

## 2024-12-16 RX ADMIN — OFLOXACIN 1 DROP(S): 3 SOLUTION OPHTHALMIC at 23:09

## 2024-12-16 RX ADMIN — Medication 40 MILLIGRAM(S): at 12:33

## 2024-12-16 RX ADMIN — Medication 1 SPRAY(S): at 18:43

## 2024-12-16 RX ADMIN — ERYTHROMYCIN 1 APPLICATION(S): 5 OINTMENT OPHTHALMIC at 21:11

## 2024-12-16 RX ADMIN — Medication 40 MILLIEQUIVALENT(S): at 06:34

## 2024-12-16 RX ADMIN — Medication 25 GRAM(S): at 00:01

## 2024-12-16 RX ADMIN — ACETYLCYSTEINE 4 MILLILITER(S): 200 INHALANT RESPIRATORY (INHALATION) at 05:57

## 2024-12-16 RX ADMIN — Medication 1 DROP(S): at 17:09

## 2024-12-16 RX ADMIN — Medication 1 DROP(S): at 23:09

## 2024-12-16 RX ADMIN — Medication 1 APPLICATION(S): at 19:36

## 2024-12-16 RX ADMIN — ACETYLCYSTEINE 4 MILLILITER(S): 200 INHALANT RESPIRATORY (INHALATION) at 11:15

## 2024-12-16 NOTE — PROGRESS NOTE ADULT - SUBJECTIVE AND OBJECTIVE BOX
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 s/p peg 10/21. Prolonged hospital stay inability to transfer to Rehab due to lack of insurance.   On 12/15 AM while in Stepdown unit patient became tachycardic HR 120s and 10 minutes later O2 sat dropped as low as 89%. Patient suctioned without improvement in O2 sat and tube feeds found in suction catheter. TFs held.  STAT CXR ordered. STAT labs sent. Respiratory therapist called to bedside and patient trach connected to ventilator. After connection to ventilator and further suctioning O2 sat improved to 97% but patient HR remains 120-130s. Upgraded to NSICU for further management.    HOSPITAL COURSE:   9/30: Transferred from [facility]. A line placed. Versed dc'd. Cy Rader at bedside, states pt has family in [place], cannot confirm medications or PMH other than stroke and MI. 250cc bolus 3% given. LR switched to NS. hydralazine 25q8 started, 3% started, switched propofol to precedex   10/1: stability CTH done. Added labetalol, started TF. Palliative consulted. ethics consulted to determine surrogate. febrile 103, pan cx sent  10/2: BD 2, GEORGE overnight. TF resumed. Desatt'd to 80s, FiO2 inc. to 50. Fentanyl given, ABG, CXR ordered. Maxxed on precedex, started on propofol for DARIEN -4 - -5. Precedex dc'd. Duonebs, mucomyst, hypertonic added. 3% dc'd. Cardene dc'd. Start vanc/CTX. Increased labetalol 200q8. MRSA negative, dc'd vanc. ETT pulled back 2cm x 2, good positioning after confirmatory chest xray. Ethics attempting to establish HCP with family. Na 159, starting FW 250q6 for range 150-155.   10/3: BD3, GEORGE o/n, neuro stable. Na elevating, FW increased to 300q6. Dc'd bowel reg for diarrhea. vEEG started. SQH 5000q8 tonight.   10/4: BD 4, albumn bolus, incr. LR to 80 2/2 incr. in Cr, LR to 100cc/hr for uptrending Cr. Started 7% hypersal for 48hrs and SL atropine for inline/oral thick secretions. Dc'd CTX and started ancef for MSSA in the sputum. Nephrology consulted for CKD, f/u recs. SBP 170s, given hydralazine 10mg IVP.   10/5: BD5, o/n 10mg IVP hydralazine given for SBP 170s and started on hydralazine 25q8 via OGT. 10mg IV push labetalol for SBP > 160s. RT placed for diarrhea.   10/6: BD6, o/n FW increased to 350q4 per nephrology recs. IV tylenol for temp 100.6, SBp 160s presumed uncomfortable.   10/7: BD7, overnight pancultured for temp 101.8F.   10/8: BD8. GEORGE. Cr bumped. decreased LR to 75cc/hr. Adding simethicone ATC. incr hydralazine 50mgTID. Incr labetalol 300mgTID. Na 145, decreased FWF to 250q6. Start precedex. FENa consistent with intrinsic kidney injury. Pend repeat renal US. Retaining up to 1.3L, bladder scans q6, straight cath PRN  10/9: BD 9. GEORGE overnight. Neuro stable. abd xray for distention w non-specific gas pattern, OGT to LIWS for morning. duonebs/mucomyst to q8 for improving secretions. Changed tube feeds to Jevity 1.5 20cc/hr, low rate due to abdominal distention, nepro dense and more difficult to digest. Tolerating CPAP, confirmed by ABG.   10/12: BD 12. GEORGE overnight. Neuro stable. MRI brain complete.   10/13: BD 13. Increase flomax. Hold SQH after PM dose for trach tm. IVL.   10/14: BD 14. GEORGE overnight, remains on AC/VC. Gabriel placed for urinary retention. Dc'd free water.  S/p trach with pulm. NGT placed and CXR confirmed in good position.   10/15: BD 15, GEORGE ovn. resumed feeds. spiked 101, pan cx sent.   10/16: BD 16. GEORGE ovn. Lokelma 5mg for K+ 5.4. Started vanc q 24/zosyn for empiric PNA coverage, IVF to 100/hr. PEG held for fever.   10/17: BD 17,  ordered serum osm and urine osm for am. Started sinemet for neurostimulation. Increased cardura to 0.8. Started FW 100q4, dc'd IVF. MRSA negative, dc'd vanc. NGT replaced d/t coiling.   10/18: BD 18, GEORGE overnight, neuro stable. Amantadine added for neurostim. zosyn changed to unasyn for acinetobacter baumannii, failed TOV and required SC  10/19: BD 19, GEORGE ovn. cardura 2mg added for retention. labetalol decreased 200q8, hydralazine decreased 25q8. Gabriel replaced.   10/20: BD20, GEORGE overnight. NGT dislodged, replaced. PEG tomorrow w/ gen surg, FW increased to 150q4 and labetalol decreased to 100q8, lokelma given for hyperkalemia.   10/21: BD 21. POD0 PEG placement with Gen surg. decr labetolol to 50q8, incr. cardura to 0.4, started lokelma and phoslo, dc gabriel POD0 PEG placement with Gen surg.  10/22: BD 22. Plan to start TF today via PEG. dc labetalol, Following ophtho recs. Increased apnea settings - found to be in cheyne-moe respiration. CPAP 5/5.  10/23: BD 23. hydralazine d/c'd, trach collar trial today. Rectal tube placed at 6am.  10/24: BD24, o/n lokelma held due to diarrhea. Free water 100q6 resumed. dc'd tamsulosin, amantadine. Incr'd cardura to 8mg qhs. Dc'd FW. Switched jevity to nepro. gabriel placed for high urine output. Started SL atropine for oral secretions. Dc'd free water.  10/25: BD25, o/n decreased suctioning requirements to > q4hrs, GEORGE. Cr improving, cont phoslo, lokelma held at this time. Gabriel placed yest, cont. Tolerating trach collar. Given 500cc plasmalyte bolus for ANIKA. Dc'd sinemet.   10/26: BD26, o/n resumed lokelma 5mg daily and resumed 100cc free water q6hrs. Change in neuro status with new right pupillary dilation with anisocoria (right pupil 6mm fixed and left pupil 3mm briskly reactive). Given 23.4% NaCl bullet, taken for emergent CTH showing mostly resolved pontine hemorrhage, continued brainstem hypodensity likely edema d/t hemorrhage, no new hemorrhage or infarct, no herniation, mild increase in size of left lateral ventricle. Vitals remaine stable. Na goal > 140.   10/27: BD27, o/n GEORGE.Neuro stable. Pend stepdown with airway bed.   11/3: BD 34, GEORGE overnight, neuro stable. Patient remains tachycardic, EKG showing sinus tachycardia, given additional 500cc NS bolus. Febrile to 101.9F, pan cultured (without UA), CXR WNL, given tylenol.   11/5: BD 36 GEORGE overnight, neuro stable. Vancomycin dc'd. Chest PT BID. ID consulted, cont zosyn.  11/6: BD 37. blood cx + klebsiella dc zosyn changed to cefepime, CTAP ordered, rpt blood cx sent.    11/7: BD 38. Pending CT A/P, given 250cc bolus and starting maintenance fluids overnight. Pending CT A/P after bolus   11/8: BD 39. CT CAP negative for infection.   11/10: BD 41. GEORGE overnight. desat to 85 on trach collar, O2 inc to 10L and 100%, O2 sat inc to 95. pt tachy to 110s, euvolemic. given tylenol. ABG and CXR ordered. spiked fever, pancultured, RVP negative. AM ABG w pO2 79, rpt w pO2 79. pt appears comfortable, satting 94%.   11/11: BD 42. GEORGE overnight. pt became tachy to 130s, desat to 90 on 100% FiO2 and 10L. suctioned, (+) productive cough. temp 101.4, given 1g IV tylenol and 500cc NS bolus for euvolemia. fever and HR downtrending. LE dopplers negative for dvt  11/12: BD 43, GEORGE ovn, fever and HR downtrending, satting 97% 70% FIO2  11/13: BD 44, GEORGE ovn. started standing tylenol x24 hours for tachycardia. desat to 80s, o2 increased. CXR stable, pending CTA PE protocol.   11/15: BD 46, GEORGE overnight, neuro stable.  Rapid called for desaturation 30s, tachycardic 140s. Patient bagged, 100% fio2, heavily suctioned. CXR/POCUS unremarkable. ABG c/w desaturation. WBC 14.71. Afebrile. O2 improved to 90s and patient upgraded to ICU. ABG paO2 30s improved to 89 on vent. IV Tylenol x 1, sputum sent. Start protonix while o-n vent.   11/16: BD 47. POCUS showed collapsable IVCF, given 1L bolus. Vanco/Cefpime added empriic for PNA, NGT feeds restarted. MRSA swab neg, Vanc DC'd.   11/17: BD 48. GEORGE overnight. 1l bolus for tachycardia. Spiked to 101, cultured. 500cc bolus for tachycardia, tachy to 148 given 25mcg fentanyl, 250cc albumin, 1.5L bolus. 5 IV lopressor with response HR to 100s. +Stenotrophomonas on sputum cx.   11/18: BD 49. GEORGE overnight. Consulted ID, cefepime switched to bactrim x7days. Started hydrochlorothiazine 12.5mg daily.  11/19: BD 50. Tachy 120s, given tylenol and 500cc NS. Tolerating 5/5, switched to TCx. Holding phos binder. D/c Bactrim. D/c nery, f/u TOV. Dc'd PPI.   11/20: BD 51. GEORGE ovn. 1600 satting low 90s, mildly tachy to 110s, afebrile, RR wnl. O2 improved to mid 90s while inc O2 to 100% on TCx. CPAP 5/5 placed back on.  11/22: BD 53, GEORGE ovn. Resumed phoslo. Gabriel placed. Resumed HCTZ.   11/23: BD 54. Holding tylenol in setting of possible fever, will require pan cx if febrile. Cr improved today. Cont CPAP. Bowel regimen held i/s/o diarrhea. FOBT negative.  11/24: BD 55. GEORGE overnight. Neuro stable. HCTZ dc'ed, started lisinopril 5. Lokelma dc'ed for K 3.7.   11/25: BD 56, GEORGE overnight. Neuro stable. dc'd lisinopril 5mg. Gabriel dc'd. TOV. 1545 noted to be hypotensive, MAP 50, in supine position on chair, HR 60s, afebrile, O2 96%. Given 1L cc NS bolus, placed back on bed in reverse trendelenberg, improved to map of 66. Neostick at bedside. Vitals check q1h. Dc'ed amlodipine. Failed TOV, bladder scan q6, sc prn. Added back Senna.   11/28: BD 59. Gabriel replaced.   12/11: GEORGE, mucomyst added for thick secretions, simethicone for abd distension, abd xray with stool burden, increased bowel regimen.   12/15: o/n Patient became tachycardic HR 120s and 10 minutes later O2 sat dropped as low as 89%. Patient suctioned without improvement in O2 sat and tube feeds found in suction catheter. TFs held.  STAT CXR ordered. STAT labs sent. Respiratory therapist called to bedside and patient trach connected to ventilator. After connection to ventilator and further suctioning O2 sat improved to 97% but patient HR remains 120-130s. Upgraded to NSICU for further management.      ICU Vital Signs Last 24 Hrs  T(C): 36.1 (16 Dec 2024 06:13), Max: 36.5 (15 Dec 2024 14:04)  T(F): 96.9 (16 Dec 2024 06:13), Max: 97.7 (15 Dec 2024 14:04)  HR: 60 (16 Dec 2024 08:00) (60 - 87)  BP: 130/92 (16 Dec 2024 08:00) (111/78 - 144/95)  BP(mean): 106 (16 Dec 2024 08:00) (89 - 122)  RR: 14 (16 Dec 2024 08:00) (14 - 16)  SpO2: 100% (16 Dec 2024 08:00) (96% - 100%)      12-15-24 @ 07:01  -  12-16-24 @ 07:00  --------------------------------------------------------  IN: 2290 mL / OUT: 1245 mL / NET: 1045 mL    12-16-24 @ 07:01  -  12-16-24 @ 08:33  --------------------------------------------------------  IN: 50 mL / OUT: 50 mL / NET: 0 mL      Mode: AC/ CMV (Assist Control/ Continuous Mandatory Ventilation), RR (machine): 14, TV (machine): 400, FiO2: 40, PEEP: 5, ITime: 1, MAP: 8.5, PIP: 16      Gen: Awake, trach to vent  Neurological exam   Mental status: Awake, attend, oriented x 2 with choices by looking down, does not fully track but able to do so with left eye,  (EOMI impaired on R), following commands  CV: S1/S2, RRR  Lungs: Decreased breath sounds bilaterally, no wheezing  Abdomen: Bowel sounds  Extremities:  No edema      MEDICATIONS:   acetaminophen   Oral Liquid .. 650 milliGRAM(s) Oral every 6 hours PRN  acetylcysteine 10%  Inhalation 4 milliLiter(s) Inhalation every 6 hours  albuterol/ipratropium for Nebulization 3 milliLiter(s) Nebulizer every 6 hours  amLODIPine   Tablet 5 milliGRAM(s) Oral daily  artificial tears (preservative free) Ophthalmic Solution 1 Drop(s) Right EYE every 4 hours  chlorhexidine 0.12% Liquid 15 milliLiter(s) Oral Mucosa every 12 hours  chlorhexidine 2% Cloths 1 Application(s) Topical <User Schedule>  doxazosin 8 milliGRAM(s) Oral at bedtime  erythromycin   Ointment 1 Application(s) Right EYE at bedtime  heparin   Injectable 5000 Unit(s) SubCutaneous every 8 hours  ofloxacin 0.3% Solution 1 Drop(s) Right EYE four times a day  pantoprazole  Injectable 40 milliGRAM(s) IV Push daily  petrolatum Ophthalmic Ointment 1 Application(s) Both EYES two times a day  piperacillin/tazobactam IVPB.. 3.375 Gram(s) IV Intermittent every 8 hours  polyethylene glycol 3350 17 Gram(s) Oral every 12 hours  senna 2 Tablet(s) Oral every 12 hours  sodium chloride 0.9%. 1000 milliLiter(s) (50 mL/Hr) IV Continuous <Continuous>  vancomycin  IVPB 1250 milliGRAM(s) IV Intermittent every 12 hours      LABS:                         9.9    10.62 )-----------( 174      ( 16 Dec 2024 05:02 )             30.9     12-16    139  |  105  |  36[H]  ----------------------------<  100[H]  3.6   |  24  |  1.29    Ca    8.7      16 Dec 2024 05:02  Phos  4.3     12-16  Mg     2.1     12-16      ABG - ( 15 Dec 2024 12:53 )  pH, Arterial: 7.40  pH, Blood: x     /  pCO2: 43    /  pO2: 137   / HCO3: 27    / Base Excess: 1.5   /  SaO2: 99.5

## 2024-12-16 NOTE — PROGRESS NOTE ADULT - ASSESSMENT
ASSESSMENT AND PLAN  45 y/o PMH ?stroke/MI present to [facility] after collapsing at work. Decorticate posturing, vomiting, intubated for airway protection. 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 Transferred to [facility] for further management. s/p trach 10/14. s/p peg 10/21. Re-upgrade to ICU 2/2 desaturation event and suctioning requirements 11/15. Re-upgrade again on 12/15 for desaturation event.      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   - neuro/vitals q4h, protected sleep time 10PM-5AM  - pain control: Tylenol prn  - PT/OT evaluation   - pending rehab     CV - tachycardia in the setting of hypoxia/increased work-of breathing now resolved  troponin-HS 12/15: 51  pro-bnp 12/15: 83  CTA chest PE study 12/15: negative for PE  - -160  - restart amlodipine 5 mg on 12/16 AM  - echo (9/30) EF 75%  - obtain second troponin-HS   - telemetry monitoring     Resp: acute hypoxic respiratory failure requiring mechanical ventilation ---> trach to vent  CTA PE study 12/15 - No PE,  ++ atelectasis   Chest X-ray 12/15 reviewed  ABG - ( 15 Dec 2024 00:16 )  pH, Arterial: 7.46  pH, Blood: x     /  pCO2: 39    /  pO2: 49    / HCO3: 28    / Base Excess: 3.7   /  SaO2: 88.4    ABG - ( 15 Dec 2024 12:53 )  pH, Arterial: 7.40  pH, Blood: x     /  pCO2: 43    /  pO2: 137   / HCO3: 27    / Base Excess: 1.5   /  SaO2: 99.5      - On Full vent support ---> weaning FiO2 and PEEP   - Start PS trial in the morning as tolerated   - Lung protective ventilation   - chest PT q4/duonebs, aggressive pulmonary toilette        GI - episode of vomiting with aspiration   - TF via PEG (placed 10/21 by gen surg) --->restart trickle feeding   - bowel regimen, last BM 12/14  - PPI while intubated     Renal: ANIKA on CKD substantially improving from admission; urinary retention persistent requiring Vuong   12-15    140  |  103  |  41[H]  ----------------------------<  119[H]  3.9   |  24  |  1.19    Ca    8.5      15 Dec 2024 05:23  Phos  4.4     12-15  Mg     2.0     12-15    - IVF reduce to 50cc/hour ---> start trickle feeding and KVO when advancing feeding   - Urinary retention - Vuong replaced 12/15  - CKD: trend BUN/Cr  - renal US 10/1: echogenicity c/w chronic med renal dz, repeat 10/8: inc renal echogenicity c/f medical renal disease w increased hydro     Endo:   - FS q6 while NPO   - A1c 5.4  - avoid hypoglycemia     Heme:                        11.3   13.98 )-----------( 183      ( 15 Dec 2024 05:23 )             35.8     - DVT ppx: SCDs, SQH 5000u q8h   - LE dopplers negative 11/11       ID - tachycardic/hypoxic, aspiration event, c/w aspiration PNA + leukocytosis (10.83 ---> 13.98)  - started Vanc and Zosyn (12/15) continue until cultures results finalized  - Vanc trough  ---> 4PM on 12/16   - Blood culture 12/15: NGTD prelim   - Sputum culture: gram + cocci and gram - rods - pending speciation   - s/p Stenotrophomonas maltophilia PNA: s/p Bactrim (11/18-11/19) s/p Cefepime (11/16-11/18)  - 11/3, (+) sputum for stenotrophomas maltophlia, blood cx (+) klebsiella, cefepime 2gq12 (11/6 - 11/12)   - empiric tx: s/p zosyn (11/3-11/6) , s/p vanc  (11/3-11/5)  - S/p Ancef (10/4-10/14) for PNA, and s/p Unasyn (10/18-10/23) +actinobacter baumanii     MISC:  - Ophthalmology consulted for keratitis  - Erythromycin ointment to rt eye q4hrs, ofloxacin ointment to rt eye QID, artificial tears to rt eye q2hrs, moisture chamber at bedtime    Dispo: NeuroICU  ----> overall pending placement and guardianship hearing     Jazlyn Tomlin MD,   NeuroICU Attending    ASSESSMENT AND PLAN  45 y/o PMH ?stroke/MI present to [facility] after collapsing at work. Decorticate posturing, vomiting, intubated for airway protection. 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 Transferred to [facility] for further management. s/p trach 10/14. s/p peg 10/21. Re-upgrade to ICU 2/2 desaturation event and suctioning requirements 11/15. Re-upgrade again on 12/15 for desaturation event.      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   - neuro/vitals q4h, protected sleep time 10PM-5AM  - pain control: Tylenol prn  - PT/OT evaluation   - pending rehab     CV - tachycardia in the setting of hypoxia/increased work-of breathing now resolved  troponin-HS 12/15: 51  pro-bnp 12/15: 83  CTA chest PE study 12/15: negative for PE  - -160  - Amlodipine 5 mg on 12/16 AM  - echo (9/30) EF 75%  - obtain second troponin-HS   - telemetry monitoring     Resp: acute hypoxic respiratory failure requiring mechanical ventilation ---> trach to vent  CTA PE study 12/15 - No PE,  ++ atelectasis   Chest X-ray 12/15 reviewed  ABG - ( 15 Dec 2024 00:16 )  pH, Arterial: 7.46  pH, Blood: x     /  pCO2: 39    /  pO2: 49    / HCO3: 28    / Base Excess: 3.7   /  SaO2: 88.4    ABG - ( 15 Dec 2024 12:53 )  pH, Arterial: 7.40  pH, Blood: x     /  pCO2: 43    /  pO2: 137   / HCO3: 27    / Base Excess: 1.5   /  SaO2: 99.5      - On Full vent support ---> weaning FiO2 and PEEP   - PS trial as tolerated  - Lung protective ventilation   - chest PT q4/duonebs, aggressive pulmonary toilette        GI - episode of vomiting with aspiration   - TF via PEG (placed 10/21 by gen surg) ---> feeding at goal    - bowel regimen, last BM 12/14  - PPI while intubated     Renal: ANIKA on CKD substantially improving from admission; urinary retention persistent requiring Vuong   12-16    139  |  105  |  36[H]  ----------------------------<  100[H]  3.6   |  24  |  1.29    Ca    8.7      16 Dec 2024 05:02  Phos  4.3     12-16  Mg     2.1     12-16    - IVF 50 ml/ht  - Urinary retention - Vuong replaced 12/15  - CKD: trend BUN/Cr  - renal US 10/1: echogenicity c/w chronic med renal dz, repeat 10/8: inc renal echogenicity c/f medical renal disease w increased hydro     Endo:   - FS q6 while NPO   - A1c 5.4  - avoid hypoglycemia     Heme:                           9.9    10.62 )-----------( 174      ( 16 Dec 2024 05:02 )             30.9   - DVT ppx: SCDs, SQH 5000u q8h   - LE dopplers negative 11/11       ID - tachycardic/hypoxic, aspiration event, c/w aspiration PNA + leukocytosis (10.83 ---> 13.98)  - started Vanc and Zosyn (12/15) continue until cultures results finalized  - Vanc trough  ---> 4PM on 12/16 30 ---> Vanc held   - Vanc rendom level 12/17 AM  - Blood culture 12/15: NGTD prelim   - Sputum culture: gram + cocci and gram - rods - pending speciation   - s/p Stenotrophomonas maltophilia PNA: s/p Bactrim (11/18-11/19) s/p Cefepime (11/16-11/18)  - 11/3, (+) sputum for stenotrophomas maltophlia, blood cx (+) klebsiella, cefepime 2gq12 (11/6 - 11/12)   - empiric tx: s/p zosyn (11/3-11/6) , s/p vanc  (11/3-11/5)  - S/p Ancef (10/4-10/14) for PNA, and s/p Unasyn (10/18-10/23) +actinobacter baumanii     MISC:  - Ophthalmology consulted for keratitis  - Erythromycin ointment to rt eye q4hrs, ofloxacin ointment to rt eye QID, artificial tears to rt eye q2hrs, moisture chamber at bedtime    Dispo: NeuroICU  ----> overall pending placement and guardianship hearing     Jazlyn Balucani MD,   NeuroICU Attending

## 2024-12-16 NOTE — PROGRESS NOTE ADULT - ASSESSMENT
ASSESSMENT AND PLAN  45 y/o PMH ?stroke/MI   Admitted with 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   S/P trach 10/14. s/p peg 10/21. Re-upgrade to ICU 2/2 desaturation event and suctioning requirements 11/15. Re-upgrade again on 12/15 for desaturation event.      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 hypovolemia?/hypoxia/respiratory distress?). RESOLVED.  CTA chest PE no PE in major vessels  -160. Vitals Q 2  Holding amlodipine 5mg and lisinopril 5mg; resume as necessary  TTE (9/30) EF 75%  Troponin; 51->   EKG:  Telemetry monitoring.      RESPIRATION: Acute hypoxic respiratory failure requiring mechanical ventilation.   Full vent support/lung protective ventilation -> CPAP 12/16  Aggressive chest PT/duonebs, aggressive pulmonary toilet  CTA chest PA study negative for PE in major vessel; did show  Consider pulm consult if difficulty weaning/ pulm optimization    GI.; Episode of vomiting with aspiration   TF via PEG (placed 10/21 by gen surg). Trickle feeds. Advance as tolerated  Bowel regimen, last BM 12/14  PPI while on positive pressuure  Abdominal XRAY    RENAL:  ANIKA on CKD. Improving; urine retention   IVF:   Free water  Urinary retention: Replaced gabriel catheter 12/15  CKD: Trend BUN/Cr  Renal US 10/1: echogenicity c/w chronic med renal disease, repeat 10/8. Increased renal echogenicity consistent with medical renal disease with increased hydronephrosis    ENDO:  A1c 5.4  Avoid hypoglycemia. Fingersticks, ISS    HEME:  DVT ppx: SCDs, SQH 5000u q8h   LE dopplers negative 11/11  CTA chest PA study negative for PE    ID: Tachycardic/hypoxic, aspiration event. Suspect aspiration PNA  On Vanc and Zosyn  Pending results of sputum/blood cultures   S/P stenotrophomonas maltophilia PNA: s/p Bactrim (11/18-11/19) s/p Cefepime (11/16-11/18)  11/3, (+) sputum for stenotrophomas maltophlia, blood cx (+) klebsiella, cefepime 2gq12 (11/6 - 11/12)   Empiric tx: s/p zosyn (11/3-11/6) , s/p vanc  (11/3-11/5)  S/P Ancef (10/4-10/14) for PNA, and s/p Unasyn (10/18-10/23) +actinobacter baumanii     MISC:  Ophthalmology consulted for keratitis  Erythromycin ointment to rt eye q4hrs, ofloxacin ointment to rt eye QID, artificial tears to rt eye q2hrs, moisture chamber at bedtime    MISC:    SOCIAL/FAMILY:  [] awaiting [x] updated at bedside [] family meeting    CODE STATUS:  [x] Full Code [] DNR [] DNI [] Palliative/Comfort Care    DISPOSITION:  [x] ICU [] Stroke Unit [] Floor [] EMU [] RCU [] PCU    Time spent: 60 critical care minutes     ASSESSMENT AND PLAN  45 y/o PMH ?stroke/MI   Admitted with 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   S/P trach 10/14. s/p peg 10/21. Re-upgrade to ICU 2/2 desaturation event and suctioning requirements 11/15. Re-upgrade again on 12/15 for desaturation event.      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 hypovolemia?/hypoxia/respiratory distress?). RESOLVED.  CTA chest PE no PE in major vessels  -160. Vitals Q2  Holding amlodipine 5mg and lisinopril 5mg; resume as necessary  TTE (9/30) EF 75%. Repeat   Troponin; 51-> 75->  EKG 12/17  Telemetry monitoring.    RESPIRATION: Acute hypoxic respiratory failure requiring mechanical ventilation.   Full vent support/lung protective ventilation -> CPAP 12/16  Aggressive chest PT/duonebs, aggressive pulmonary toilet  CTA chest PA study negative for PE in major vessels; did show near-complete to complete LLL atelectasis. Partial RLL atelectasis  Consider pulm consult if difficulty weaning/ pulm optimization    GI; Episode of vomiting with aspiration   TF via PEG (placed 10/21 by gen surg). Trickle feeds. Advance as tolerated  Bowel regimen, last BM 12/14  PPI while on positive pressure  Abdominal Xray negative for ileus    RENAL: ANIKA on CKD. Resolved  Urine retention: Replaced gabriel catheter 12/15  IVF 50cc/hr. IVL once tolerating  Free water flushes  CKD: Trend BUN/Cr  Renal US 10/1: echogenicity consistent with chronic med renal disease, repeat 10/8. Increased renal echogenicity consistent with medical renal disease with increased hydronephrosis    ENDO:  A1c 5.4  Avoid hypoglycemia. Fingersticks, ISS    HEME:  DVT ppx: SCDs, SQH 5000u q8h   LE dopplers negative 11/11  CTA chest PA study negative for PE    ID: Tachycardic/hypoxic, aspiration event. Suspect aspiration PNA  On Vanc and Zosyn  Pending results of sputum/blood cultures   S/P stenotrophomonas maltophilia PNA: s/p Bactrim (11/18-11/19) s/p Cefepime (11/16-11/18)  11/3, (+) sputum for stenotrophomas maltophlia, blood cx (+) klebsiella, cefepime 2gq12 (11/6 - 11/12)   Empiric tx: s/p zosyn (11/3-11/6) , s/p vanc  (11/3-11/5)  S/P Ancef (10/4-10/14) for PNA, and s/p Unasyn (10/18-10/23) +actinobacter baumanii     MISC:  Ophthalmology consulted for keratitis  Erythromycin ointment to rt eye q4hrs, ofloxacin ointment to rt eye QID, artificial tears to rt eye q2hrs, moisture chamber at bedtime    MISC:    SOCIAL/FAMILY:  [] awaiting [x] updated at bedside [] family meeting    CODE STATUS:  [x] Full Code [] DNR [] DNI [] Palliative/Comfort Care    DISPOSITION:  [x] ICU [] Stroke Unit [] Floor [] EMU [] RCU [] PCU    Time spent: 60 critical care minutes

## 2024-12-16 NOTE — PROGRESS NOTE ADULT - SUBJECTIVE AND OBJECTIVE BOX
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 s/p peg 10/21. Prolonged hospital stay inability to transfer to Rehab due to lack of insurance.   On 12/15 AM while in Stepdown unit patient became tachycardic HR 120s and 10 minutes later O2 sat dropped as low as 89%. Patient suctioned without improvement in O2 sat and tube feeds found in suction catheter. TFs held.  STAT CXR ordered. STAT labs sent. Respiratory therapist called to bedside and patient trach connected to ventilator. After connection to ventilator and further suctioning O2 sat improved to 97% but patient HR remains 120-130s. Upgraded to NSICU for further management.    Detailed Hospital Course/Interval Events   9/30: transferred from [facility]. A line placed. Versed dc'd. Cy Rader at bedside, states pt has family in [place], cannot confirm medications or PMH other than stroke and MI. 250cc bolus 3% given. LR switched to NS. hydralazine 25q8 started, 3% started, switched propofol to precedex   10/1: stability CTH done. Added labetalol, started TF. Palliative consulted. ethics consulted to determine surrogate. febrile 103, pan cx sent  10/2: BD 2, GEORGE overnight. TF resumed. Desatt'd to 80s, FiO2 inc. to 50. Fentanyl given, ABG, CXR ordered. Maxxed on precedex, started on propofol for DARIEN -4 - -5. Precedex dc'd. Duonebs, mucomyst, hypertonic added. 3% dc'd. Cardene dc'd. Start vanc/CTX. Increased labetalol 200q8. MRSA negative, dc'd vanc. ETT pulled back 2cm x 2, good positioning after confirmatory chest xray. Ethics attempting to establish HCP with family. Na 159, starting FW 250q6 for range 150-155.   10/3: BD3, GEORGE o/n, neuro stable. Na elevating, FW increased to 300q6. Dc'd bowel reg for diarrhea. vEEG started. SQH 5000q8 tonight.   10/4: BD 4, albumn bolus, incr. LR to 80 2/2 incr. in Cr, LR to 100cc/hr for uptrending Cr. Started 7% hypersal for 48hrs and SL atropine for inline/oral thick secretions. Dc'd CTX and started ancef for MSSA in the sputum. Nephrology consulted for CKD, f/u recs. SBP 170s, given hydralazine 10mg IVP.   10/5: BD5, o/n 10mg IVP hydralazine given for SBP 170s and started on hydralazine 25q8 via OGT. 10mg IV push labetalol for SBP > 160s. RT placed for diarrhea.   10/6: BD6, o/n FW increased to 350q4 per nephrology recs. IV tylenol for temp 100.6, SBp 160s presumed uncomfortable.   10/7: BD7, overnight pancultured for temp 101.8F.   10/8: BD8. GEORGE. Cr bumped. decreased LR to 75cc/hr. Adding simethicone ATC. incr hydralazine 50mgTID. Incr labetalol 300mgTID. Na 145, decreased FWF to 250q6. Start precedex. FENa consistent with intrinsic kidney injury. Pend repeat renal US. Retaining up to 1.3L, bladder scans q6, straight cath PRN  10/9: BD 9. GEORGE overnight. Neuro stable. abd xray for distention w non-specific gas pattern, OGT to LIWS for morning. duonebs/mucomyst to q8 for improving secretions. Changed tube feeds to Jevity 1.5 20cc/hr, low rate due to abdominal distention, nepro dense and more difficult to digest. Tolerating CPAP, confirmed by ABG.   10/10: BD 10. GEORGE overnight. Neuro stable. (+) gabriel for urinary retention on bladder scan. inc TF to goal rate of 40cc/hr. family leaning toward pursuing trach/PEG. 1/2 amp for FS 81.   10/11: BD 11. GEORGE overnight. Neuro stable. Trach/PEG consults placed.   10/12: BD 12. GEORGE overnight. Neuro stable. MRI brain complete.   10/13: BD 13. Increase flomax. Hold SQH after PM dose for trach tm. IVL.   10/14: BD 14. GEORGE overnight, remains on AC/VC. Gabriel placed for urinary retention. Dc'd free water.  S/p trach with pulm. NGT placed and CXR confirmed in good position.   10/15: BD 15, GEORGE ovn. resumed feeds. spiked 101, pan cx sent.   10/16: BD 16. GEORGE ovn. Lokelma 5mg for K+ 5.4. Started vanc q 24/zosyn for empiric PNA coverage, IVF to 100/hr. PEG held for fever.   10/17: BD 17,  ordered serum osm and urine osm for am. Started sinemet for neurostimulation. Increased cardura to 0.8. Started FW 100q4, dc'd IVF. MRSA negative, dc'd vanc. NGT replaced d/t coiling.   10/18: BD 18, GEORGE overnight, neuro stable. Amantadine added for neurostim. zosyn changed to unasyn for acinetobacter baumannii, failed TOV and required SC  10/19: BD 19, GEORGE ovn. cardura 2mg added for retention. labetalol decreased 200q8, hydralazine decreased 25q8. Gabriel replaced.   10/20: BD20, GEORGE overnight. NGT dislodged, replaced. PEG tomorrow w/ gen surg, FW increased to 150q4 and labetalol decreased to 100q8, lokelma given for hyperkalemia.   10/21: BD 21. POD0 PEG placement with Gen surg. decr labetolol to 50q8, incr. cardura to 0.4, started lokelma and phoslo, dc gabriel POD0 PEG placement with Gen surg.  10/22: BD 22. Plan to start TF today via PEG. dc labetalol, Following ophtho recs. Increased apnea settings - found to be in cheyne-moe respiration. CPAP 5/5.  10/23: BD 23. hydralazine d/c'd, trach collar trial today. Rectal tube placed at 6am.  10/24: BD24, o/n lokelma held due to diarrhea. Free water 100q6 resumed. dc'd tamsulosin, amantadine. Incr'd cardura to 8mg qhs. Dc'd FW. Switched jevity to nepro. gabriel placed for high urine output. Started SL atropine for oral secretions. Dc'd free water.  10/25: BD25, o/n decreased suctioning requirements to > q4hrs, GEORGE. Cr improving, cont phoslo, lokelma held at this time. Gabriel placed yest, cont. Tolerating trach collar. Given 500cc plasmalyte bolus for ANIKA. Dc'd sinemet.   10/26: BD26, o/n resumed lokelma 5mg daily and resumed 100cc free water q6hrs. Change in neuro status with new right pupillary dilation with anisocoria (right pupil 6mm fixed and left pupil 3mm briskly reactive). Given 23.4% NaCl bullet, taken for emergent CTH showing mostly resolved pontine hemorrhage, continued brainstem hypodensity likely edema d/t hemorrhage, no new hemorrhage or infarct, no herniation, mild increase in size of left lateral ventricle. Vitals remaine stable. Na goal > 140.   10/27: BD27, o/n GEORGE.Neuro stable. Pend stepdown with airway bed.   10/28: BD 28. GEORGE overnight. Neuro stable. Miralax ordered. Gabriel removed, pending TOV.  10/29: BD 29. GEORGE o/n. Given 2L NS over 8 hrs for increased BUN/Cr ratio. Gabriel placed for frequent straight cath.   10/30: BD 30.   10/31: BD 31. GEORGE overnight. Na 149, increased free water to 200q6. 1L NS for uptrending BUN.   11/1: BD 32. GEORGE overnight. Given 1L NS for dehydration. Na 146, increased FW to 250q6.   11/2: BD 33 GEORGE overnight, neuro stable, given 500cc bolus for net negative status and tachycardia   11/3: BD 34, GEORGE overnight, neuro stable. Patient remains tachycardic, EKG showing sinus tachycardia, given additional 500cc NS bolus. Febrile to 101.9F, pan cultured (without UA), CXR WNL, given tylenol.   11/4: BD 35, GEORGE overnight, neuro stable. Given 1L NS for tachycardia. sputum (+) for stenotropohomas maltophilia.   11/5: BD 36 GEORGE overnight, neuro stable. Vancomycin dc'd. Chest PT BID. ID consulted, cont zosyn.  11/6: BD 37. blood cx + klebsiella dc zosyn changed to cefepime, CTAP ordered, rpt blood cx sent.    11/7: BD 38. Pending CT A/P, given 250cc bolus and starting maintenance fluids overnight. Pending CT A/P after bolus   11/8: BD 39. CT CAP negative for infection.   11/9: BD 40. GEORGE overnight.  11/10: BD 41. GEORGE overnight. desat to 85 on trach collar, O2 inc to 10L and 100%, O2 sat inc to 95. pt tachy to 110s, euvolemic. given tylenol. ABG and CXR ordered. spiked fever, pancultured, RVP negative. AM ABG w pO2 79, rpt w pO2 79. pt appears comfortable, satting 94%.   11/11: BD 42. GEORGE overnight. pt became tachy to 130s, desat to 90 on 100% FiO2 and 10L. suctioned, (+) productive cough. temp 101.4, given 1g IV tylenol and 500cc NS bolus for euvolemia. fever and HR downtrending. LE dopplers negative for dvt  11/12: BD 43, GEORGE ovn, fever and HR downtrending, satting 97% 70% FIO2  11/13: BD 44, GEORGE ovn. started standing tylenol x24 hours for tachycardia. desat to 80s, o2 increased. CXR stable, pending CTA PE protocol.   11/14: BD 45, GEORGE overnight, neuro stable. resp therapy dec FiO2 to 70%.   11/15: BD 46, GEORGE overnight, neuro stable.  Rapid called for desaturation 30s, tachycardic 140s. Patient bagged, 100% fio2, heavily suctioned. CXR/POCUS unremarkable. ABG c/w desaturation. WBC 14.71. Afebrile. O2 improved to 90s and patient upgraded to ICU. ABG paO2 30s improved to 89 on vent. IV Tylenol x 1, sputum sent. Start protonix while o-n vent.   11/16: BD 47. POCUS showed collapsable IVCF, given 1L bolus. Vanco/Cefpime added empriic for PNA, NGT feeds restarted. MRSA swab neg, Vanc DC'd.   11/17: BD 48. GEORGE overnight. 1l bolus for tachycardia. Spiked to 101, cultured. 500cc bolus for tachycardia, tachy to 148 given 25mcg fentanyl, 250cc albumin, 1.5L bolus. 5 IV lopressor with response HR to 100s. +Stenotrophomonas on sputum cx.   11/18: BD 49. GEORGE overnight. Consulted ID, cefepime switched to bactrim x7days. Started hydrochlorothiazine 12.5mg daily.  11/19: BD 50. Tachy 120s, given tylenol and 500cc NS. Tolerating 5/5, switched to TCx. Holding phos binder. D/c Bactrim. D/c gabriel, f/u TOV. Dc'd PPI.   11/20: BD 51. GEORGE ovn. 1600 satting low 90s, mildly tachy to 110s, afebrile, RR wnl. O2 improved to mid 90s while inc O2 to 100% on TCx. CPAP 5/5 placed back on.  11/21: BD 52, GEORGE ovn, tolerating CPAP 5/5. Switch to trach collar during the day if tolerating well. HCTZ held for Cr bump, straight cath frequence increased to q4  11/22: BD 53, GEORGE ovn. Resumed phoslo. Gabriel placed. Resumed HCTZ.   11/23: BD 54. Holding tylenol in setting of possible fever, will require pan cx if febrile. Cr improved today. Cont CPAP. Bowel regimen held i/s/o diarrhea. FOBT negative.  11/24: BD 55. GEORGE overnight. Neuro stable. HCTZ dc'ed, started lisinopril 5. Lokelma dc'ed for K 3.7.   11/25: BD 56, GEORGE overnight. Neuro stable. dc'd lisinopril 5mg. Gabriel dc'd. TOV. 1545 noted to be hypotensive, MAP 50, in supine position on chair, HR 60s, afebrile, O2 96%. Given 1L cc NS bolus, placed back on bed in reverse trendelenberg, improved to map of 66. Neostick at bedside. Vitals check q1h. Dc'ed amlodipine. Failed TOV, bladder scan q6, sc prn. Added back Senna.   11/26: BD 57, GEORGE overnight. Neuro stable. Dc'd phoslo.   11/27: BD 58, GEORGE overnight. Neuro stable.    11/28: BD 59. Gabriel replaced.   11/29: GEORGE.  11/30: GEORGE, neuro stable.   12/1: BD 62, GEORGE overnight  12/2: BD 63, GEORGE overnight.; Given 1L bolus of LR for uptrending BUN/Cr.  12/3: BD 64. Reinstated eye gtt/moisture chamber given increased Rt eye injection  12/4: BD 65. GEORGE overnight. Attempted to speak with ophtho regarding eyelid weight/closure but no answer, full mailbox.   12/5: BD 66, GEORGE overnight, bowel regimen increased and had BM.   12/6: BD 67, GEORGE overnight, neuro stable.  12/7: GEORGE overnight, neuro stable. /110s, given x1 hydralazine 10 mg IVP. Restarted home amlodipine 5mg.  12/8: GEORGE. OOB to chair.     12/11: GEORGE, mucomyst added for thick secretions, simethicone for abd distension, abd xray with stool burden, increased bowel regimen.   12/12-12/14: GEORGE  12/15: o/n Patient became tachycardic HR 120s and 10 minutes later O2 sat dropped as low as 89%. Patient suctioned without improvement in O2 sat and tube feeds found in suction catheter. TFs held.  STAT CXR ordered. STAT labs sent. Respiratory therapist called to bedside and patient trach connected to ventilator. After connection to ventilator and further suctioning O2 sat improved to 97% but patient HR remains 120-130s. Upgraded to NSICU for further management. Vancomycin and zosyn started. CTA  chest PE protocol and CTH ordered. Blood cultures sent.given 500cc bolus, rpt ABG sent pO2 243, CTH and CTA chest done. FS while NPO, FiO2 dec 50 pending ABG. sputum cx positive for few GPC and GNR.         MEDICATIONS  (STANDING):  acetylcysteine 10%  Inhalation 4 milliLiter(s) Inhalation every 6 hours  albuterol/ipratropium for Nebulization 3 milliLiter(s) Nebulizer every 6 hours  artificial tears (preservative free) Ophthalmic Solution 1 Drop(s) Right EYE every 4 hours  chlorhexidine 0.12% Liquid 15 milliLiter(s) Oral Mucosa every 12 hours  chlorhexidine 2% Cloths 1 Application(s) Topical <User Schedule>  doxazosin 8 milliGRAM(s) Oral at bedtime  erythromycin   Ointment 1 Application(s) Right EYE at bedtime  heparin   Injectable 5000 Unit(s) SubCutaneous every 8 hours  insulin lispro (ADMELOG) corrective regimen sliding scale   SubCutaneous Before meals and at bedtime  ofloxacin 0.3% Solution 1 Drop(s) Right EYE four times a day  pantoprazole  Injectable 40 milliGRAM(s) IV Push daily  petrolatum Ophthalmic Ointment 1 Application(s) Both EYES two times a day  piperacillin/tazobactam IVPB.. 3.375 Gram(s) IV Intermittent every 8 hours  polyethylene glycol 3350 17 Gram(s) Oral every 12 hours  senna 2 Tablet(s) Oral every 12 hours  simethicone 80 milliGRAM(s) Chew three times a day  sodium chloride 0.9%. 1000 milliLiter(s) (50 mL/Hr) IV Continuous <Continuous>  vancomycin  IVPB 1250 milliGRAM(s) IV Intermittent every 12 hours    MEDICATIONS  (PRN):  acetaminophen   Oral Liquid .. 650 milliGRAM(s) Oral every 6 hours PRN Temp greater or equal to 38.5C (101.3F), Mild Pain (1 - 3)          EXAMINATION   ICU Vital Signs Last 24 Hrs  T(C): 36.4 (15 Dec 2024 22:00), Max: 36.7 (15 Dec 2024 04:12)  T(F): 97.5 (15 Dec 2024 22:00), Max: 98 (15 Dec 2024 04:12)  HR: 70 (15 Dec 2024 22:01) (70 - 126)  BP: 128/85 (15 Dec 2024 22:01) (115/85 - 156/107)  BP(mean): 103 (15 Dec 2024 22:01) (96 - 125)  RR: 14 (15 Dec 2024 22:01) (14 - 22)  SpO2: 100% (15 Dec 2024 22:01) (92% - 100%)  O2 Parameters below as of 15 Dec 2024 22:01  Patient On (Oxygen Delivery Method): ventilator  O2 Concentration (%): 40  GEN: young man, lying in bed, track in place  HEENT: track in place to vent  CV: regular s1/s2  Lungs: decrease respiratory sounds anteriorly, no wheezing  Extr: no edema, peripheral pulses symmetric +  Neurological exam   Mental status: awake, tracks on left and briefly attends, appropriately moving eyebrows to orientation question (oriented to self), following simple step commands (opening mouth, showing two fingers with right hand, wiggling right toes.  CNs: pupils right larger than left, reactive to light, extraocular movement impaired on right.   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 s/p peg 10/21. Prolonged hospital stay inability to transfer to Rehab due to lack of insurance.   On 12/15 AM while in Stepdown unit patient became tachycardic HR 120s and 10 minutes later O2 sat dropped as low as 89%. Patient suctioned without improvement in O2 sat and tube feeds found in suction catheter. TFs held.  STAT CXR ordered. STAT labs sent. Respiratory therapist called to bedside and patient trach connected to ventilator. After connection to ventilator and further suctioning O2 sat improved to 97% but patient HR remains 120-130s. Upgraded to NSICU for further management.    Detailed Hospital Course/Interval Events   9/30: transferred from [facility]. A line placed. Versed dc'd. Cy Rader at bedside, states pt has family in [place], cannot confirm medications or PMH other than stroke and MI. 250cc bolus 3% given. LR switched to NS. hydralazine 25q8 started, 3% started, switched propofol to precedex   10/1: stability CTH done. Added labetalol, started TF. Palliative consulted. ethics consulted to determine surrogate. febrile 103, pan cx sent  10/2: BD 2, GEORGE overnight. TF resumed. Desatt'd to 80s, FiO2 inc. to 50. Fentanyl given, ABG, CXR ordered. Maxxed on precedex, started on propofol for DARIEN -4 - -5. Precedex dc'd. Duonebs, mucomyst, hypertonic added. 3% dc'd. Cardene dc'd. Start vanc/CTX. Increased labetalol 200q8. MRSA negative, dc'd vanc. ETT pulled back 2cm x 2, good positioning after confirmatory chest xray. Ethics attempting to establish HCP with family. Na 159, starting FW 250q6 for range 150-155.   10/3: BD3, GEORGE o/n, neuro stable. Na elevating, FW increased to 300q6. Dc'd bowel reg for diarrhea. vEEG started. SQH 5000q8 tonight.   10/4: BD 4, albumn bolus, incr. LR to 80 2/2 incr. in Cr, LR to 100cc/hr for uptrending Cr. Started 7% hypersal for 48hrs and SL atropine for inline/oral thick secretions. Dc'd CTX and started ancef for MSSA in the sputum. Nephrology consulted for CKD, f/u recs. SBP 170s, given hydralazine 10mg IVP.   10/5: BD5, o/n 10mg IVP hydralazine given for SBP 170s and started on hydralazine 25q8 via OGT. 10mg IV push labetalol for SBP > 160s. RT placed for diarrhea.   10/6: BD6, o/n FW increased to 350q4 per nephrology recs. IV tylenol for temp 100.6, SBp 160s presumed uncomfortable.   10/7: BD7, overnight pancultured for temp 101.8F.   10/8: BD8. GEORGE. Cr bumped. decreased LR to 75cc/hr. Adding simethicone ATC. incr hydralazine 50mgTID. Incr labetalol 300mgTID. Na 145, decreased FWF to 250q6. Start precedex. FENa consistent with intrinsic kidney injury. Pend repeat renal US. Retaining up to 1.3L, bladder scans q6, straight cath PRN  10/9: BD 9. GEORGE overnight. Neuro stable. abd xray for distention w non-specific gas pattern, OGT to LIWS for morning. duonebs/mucomyst to q8 for improving secretions. Changed tube feeds to Jevity 1.5 20cc/hr, low rate due to abdominal distention, nepro dense and more difficult to digest. Tolerating CPAP, confirmed by ABG.   10/10: BD 10. GEORGE overnight. Neuro stable. (+) gabriel for urinary retention on bladder scan. inc TF to goal rate of 40cc/hr. family leaning toward pursuing trach/PEG. 1/2 amp for FS 81.   10/11: BD 11. GEORGE overnight. Neuro stable. Trach/PEG consults placed.   10/12: BD 12. GEORGE overnight. Neuro stable. MRI brain complete.   10/13: BD 13. Increase flomax. Hold SQH after PM dose for trach tm. IVL.   10/14: BD 14. GEORGE overnight, remains on AC/VC. Gabriel placed for urinary retention. Dc'd free water.  S/p trach with pulm. NGT placed and CXR confirmed in good position.   10/15: BD 15, GEORGE ovn. resumed feeds. spiked 101, pan cx sent.   10/16: BD 16. GEORGE ovn. Lokelma 5mg for K+ 5.4. Started vanc q 24/zosyn for empiric PNA coverage, IVF to 100/hr. PEG held for fever.   10/17: BD 17,  ordered serum osm and urine osm for am. Started sinemet for neurostimulation. Increased cardura to 0.8. Started FW 100q4, dc'd IVF. MRSA negative, dc'd vanc. NGT replaced d/t coiling.   10/18: BD 18, GEORGE overnight, neuro stable. Amantadine added for neurostim. zosyn changed to unasyn for acinetobacter baumannii, failed TOV and required SC  10/19: BD 19, GEORGE ovn. cardura 2mg added for retention. labetalol decreased 200q8, hydralazine decreased 25q8. Gabriel replaced.   10/20: BD20, GEORGE overnight. NGT dislodged, replaced. PEG tomorrow w/ gen surg, FW increased to 150q4 and labetalol decreased to 100q8, lokelma given for hyperkalemia.   10/21: BD 21. POD0 PEG placement with Gen surg. decr labetolol to 50q8, incr. cardura to 0.4, started lokelma and phoslo, dc gabriel POD0 PEG placement with Gen surg.  10/22: BD 22. Plan to start TF today via PEG. dc labetalol, Following ophtho recs. Increased apnea settings - found to be in cheyne-moe respiration. CPAP 5/5.  10/23: BD 23. hydralazine d/c'd, trach collar trial today. Rectal tube placed at 6am.  10/24: BD24, o/n lokelma held due to diarrhea. Free water 100q6 resumed. dc'd tamsulosin, amantadine. Incr'd cardura to 8mg qhs. Dc'd FW. Switched jevity to nepro. gabriel placed for high urine output. Started SL atropine for oral secretions. Dc'd free water.  10/25: BD25, o/n decreased suctioning requirements to > q4hrs, GEORGE. Cr improving, cont phoslo, lokelma held at this time. Gabriel placed yest, cont. Tolerating trach collar. Given 500cc plasmalyte bolus for ANIKA. Dc'd sinemet.   10/26: BD26, o/n resumed lokelma 5mg daily and resumed 100cc free water q6hrs. Change in neuro status with new right pupillary dilation with anisocoria (right pupil 6mm fixed and left pupil 3mm briskly reactive). Given 23.4% NaCl bullet, taken for emergent CTH showing mostly resolved pontine hemorrhage, continued brainstem hypodensity likely edema d/t hemorrhage, no new hemorrhage or infarct, no herniation, mild increase in size of left lateral ventricle. Vitals remaine stable. Na goal > 140.   10/27: BD27, o/n GEORGE.Neuro stable. Pend stepdown with airway bed.   10/28: BD 28. GEORGE overnight. Neuro stable. Miralax ordered. Gabriel removed, pending TOV.  10/29: BD 29. GEORGE o/n. Given 2L NS over 8 hrs for increased BUN/Cr ratio. Gabriel placed for frequent straight cath.   10/30: BD 30.   10/31: BD 31. GEORGE overnight. Na 149, increased free water to 200q6. 1L NS for uptrending BUN.   11/1: BD 32. GEORGE overnight. Given 1L NS for dehydration. Na 146, increased FW to 250q6.   11/2: BD 33 GEORGE overnight, neuro stable, given 500cc bolus for net negative status and tachycardia   11/3: BD 34, GEORGE overnight, neuro stable. Patient remains tachycardic, EKG showing sinus tachycardia, given additional 500cc NS bolus. Febrile to 101.9F, pan cultured (without UA), CXR WNL, given tylenol.   11/4: BD 35, GEORGE overnight, neuro stable. Given 1L NS for tachycardia. sputum (+) for stenotropohomas maltophilia.   11/5: BD 36 GEORGE overnight, neuro stable. Vancomycin dc'd. Chest PT BID. ID consulted, cont zosyn.  11/6: BD 37. blood cx + klebsiella dc zosyn changed to cefepime, CTAP ordered, rpt blood cx sent.    11/7: BD 38. Pending CT A/P, given 250cc bolus and starting maintenance fluids overnight. Pending CT A/P after bolus   11/8: BD 39. CT CAP negative for infection.   11/9: BD 40. GEORGE overnight.  11/10: BD 41. GEORGE overnight. desat to 85 on trach collar, O2 inc to 10L and 100%, O2 sat inc to 95. pt tachy to 110s, euvolemic. given tylenol. ABG and CXR ordered. spiked fever, pancultured, RVP negative. AM ABG w pO2 79, rpt w pO2 79. pt appears comfortable, satting 94%.   11/11: BD 42. GEORGE overnight. pt became tachy to 130s, desat to 90 on 100% FiO2 and 10L. suctioned, (+) productive cough. temp 101.4, given 1g IV tylenol and 500cc NS bolus for euvolemia. fever and HR downtrending. LE dopplers negative for dvt  11/12: BD 43, GEORGE ovn, fever and HR downtrending, satting 97% 70% FIO2  11/13: BD 44, GEORGE ovn. started standing tylenol x24 hours for tachycardia. desat to 80s, o2 increased. CXR stable, pending CTA PE protocol.   11/14: BD 45, GEORGE overnight, neuro stable. resp therapy dec FiO2 to 70%.   11/15: BD 46, GEORGE overnight, neuro stable.  Rapid called for desaturation 30s, tachycardic 140s. Patient bagged, 100% fio2, heavily suctioned. CXR/POCUS unremarkable. ABG c/w desaturation. WBC 14.71. Afebrile. O2 improved to 90s and patient upgraded to ICU. ABG paO2 30s improved to 89 on vent. IV Tylenol x 1, sputum sent. Start protonix while o-n vent.   11/16: BD 47. POCUS showed collapsable IVCF, given 1L bolus. Vanco/Cefpime added empriic for PNA, NGT feeds restarted. MRSA swab neg, Vanc DC'd.   11/17: BD 48. GEORGE overnight. 1l bolus for tachycardia. Spiked to 101, cultured. 500cc bolus for tachycardia, tachy to 148 given 25mcg fentanyl, 250cc albumin, 1.5L bolus. 5 IV lopressor with response HR to 100s. +Stenotrophomonas on sputum cx.   11/18: BD 49. GEORGE overnight. Consulted ID, cefepime switched to bactrim x7days. Started hydrochlorothiazine 12.5mg daily.  11/19: BD 50. Tachy 120s, given tylenol and 500cc NS. Tolerating 5/5, switched to TCx. Holding phos binder. D/c Bactrim. D/c gabriel, f/u TOV. Dc'd PPI.   11/20: BD 51. GEORGE ovn. 1600 satting low 90s, mildly tachy to 110s, afebrile, RR wnl. O2 improved to mid 90s while inc O2 to 100% on TCx. CPAP 5/5 placed back on.  11/21: BD 52, GEORGE ovn, tolerating CPAP 5/5. Switch to trach collar during the day if tolerating well. HCTZ held for Cr bump, straight cath frequence increased to q4  11/22: BD 53, GEORGE ovn. Resumed phoslo. Gabriel placed. Resumed HCTZ.   11/23: BD 54. Holding tylenol in setting of possible fever, will require pan cx if febrile. Cr improved today. Cont CPAP. Bowel regimen held i/s/o diarrhea. FOBT negative.  11/24: BD 55. GEORGE overnight. Neuro stable. HCTZ dc'ed, started lisinopril 5. Lokelma dc'ed for K 3.7.   11/25: BD 56, GEORGE overnight. Neuro stable. dc'd lisinopril 5mg. Gabriel dc'd. TOV. 1545 noted to be hypotensive, MAP 50, in supine position on chair, HR 60s, afebrile, O2 96%. Given 1L cc NS bolus, placed back on bed in reverse trendelenberg, improved to map of 66. Neostick at bedside. Vitals check q1h. Dc'ed amlodipine. Failed TOV, bladder scan q6, sc prn. Added back Senna.   11/26: BD 57, GEORGE overnight. Neuro stable. Dc'd phoslo.   11/27: BD 58, GEORGE overnight. Neuro stable.    11/28: BD 59. Gabriel replaced.   11/29: GEORGE.  11/30: GEORGE, neuro stable.   12/1: BD 62, GEORGE overnight  12/2: BD 63, GEORGE overnight.; Given 1L bolus of LR for uptrending BUN/Cr.  12/3: BD 64. Reinstated eye gtt/moisture chamber given increased Rt eye injection  12/4: BD 65. GEORGE overnight. Attempted to speak with ophtho regarding eyelid weight/closure but no answer, full mailbox.   12/5: BD 66, GEORGE overnight, bowel regimen increased and had BM.   12/6: BD 67, GEORGE overnight, neuro stable.  12/7: GEORGE overnight, neuro stable. /110s, given x1 hydralazine 10 mg IVP. Restarted home amlodipine 5mg.  12/8: GEORGE. OOB to chair.     12/11: GEORGE, mucomyst added for thick secretions, simethicone for abd distension, abd xray with stool burden, increased bowel regimen.   12/12-12/14: GEORGE  12/15: o/n Patient became tachycardic HR 120s and 10 minutes later O2 sat dropped as low as 89%. Patient suctioned without improvement in O2 sat and tube feeds found in suction catheter. TFs held.  STAT CXR ordered. STAT labs sent. Respiratory therapist called to bedside and patient trach connected to ventilator. After connection to ventilator and further suctioning O2 sat improved to 97% but patient HR remains 120-130s. Upgraded to NSICU for further management. Vancomycin and zosyn started. CTA  chest PE protocol and CTH ordered. Blood cultures sent.given 500cc bolus, rpt ABG sent pO2 243, CTH and CTA chest done. FS while NPO, FiO2 dec 50 pending ABG. sputum cx positive for few GPC and GNR.   12/16: GEORGE overnight, restarted amlodipine, troponin 75       MEDICATIONS  (STANDING):  acetylcysteine 10%  Inhalation 4 milliLiter(s) Inhalation every 6 hours  albuterol/ipratropium for Nebulization 3 milliLiter(s) Nebulizer every 6 hours  amLODIPine   Tablet 5 milliGRAM(s) Oral daily  artificial tears (preservative free) Ophthalmic Solution 1 Drop(s) Right EYE every 4 hours  chlorhexidine 0.12% Liquid 15 milliLiter(s) Oral Mucosa every 12 hours  chlorhexidine 2% Cloths 1 Application(s) Topical <User Schedule>  doxazosin 8 milliGRAM(s) Oral at bedtime  erythromycin   Ointment 1 Application(s) Right EYE at bedtime  fluticasone propionate 50 MICROgram(s)/spray Nasal Spray 1 Spray(s) Both Nostrils two times a day  heparin   Injectable 5000 Unit(s) SubCutaneous every 8 hours  ofloxacin 0.3% Solution 1 Drop(s) Right EYE four times a day  pantoprazole  Injectable 40 milliGRAM(s) IV Push daily  petrolatum Ophthalmic Ointment 1 Application(s) Both EYES two times a day  piperacillin/tazobactam IVPB.. 3.375 Gram(s) IV Intermittent every 8 hours  polyethylene glycol 3350 17 Gram(s) Oral every 12 hours  senna 2 Tablet(s) Oral every 12 hours  sodium chloride 0.65% Nasal 1 Spray(s) Both Nostrils two times a day  sodium chloride 0.9%. 1000 milliLiter(s) (50 mL/Hr) IV Continuous <Continuous>    MEDICATIONS  (PRN):  acetaminophen   Oral Liquid .. 650 milliGRAM(s) Oral every 6 hours PRN Temp greater or equal to 38.5C (101.3F), Mild Pain (1 - 3)    EXAMINATION   ICU Vital Signs Last 24 Hrs  T(C): 36.8 (16 Dec 2024 21:07), Max: 36.8 (16 Dec 2024 14:20)  T(F): 98.3 (16 Dec 2024 21:07), Max: 98.3 (16 Dec 2024 21:07)  HR: 72 (17 Dec 2024 01:00) (60 - 78)  BP: 124/85 (17 Dec 2024 01:00) (111/78 - 149/101)  BP(mean): 99 (17 Dec 2024 01:00) (89 - 120)  RR: 15 (17 Dec 2024 01:00) (13 - 20)  SpO2: 98% (17 Dec 2024 01:00) (98% - 100%)    GEN: young man, lying in bed, track in place  HEENT: track in place to vent  CV: regular s1/s2  Lungs: decrease respiratory sounds anteriorly, no wheezing  Extr: no edema, peripheral pulses symmetric +  Neurological exam   Mental status: awake, tracks on left and briefly attends, appropriately moving eyebrows to orientation question (oriented to self), following simple step commands (opening mouth, showing two fingers with right hand, wiggling right toes.  CNs: pupils right larger than left, reactive to light, extraocular movement impaired on right.   Motor: left hemiplegia, right some spontaneous movement antigravity at the wrist, wiggling right toes      Relevant labs and imaging reviewed

## 2024-12-16 NOTE — PROGRESS NOTE ADULT - SUBJECTIVE AND OBJECTIVE BOX
S/Overnight events:  Pt seen and examined in bed with son at bedside, unable to offer any acute complaints at this time.       Hospital Course:   : transferred from [facility]. A line placed. Versed dc'd. Cy Rader at bedside, states pt has family in [place], cannot confirm medications or PMH other than stroke and MI. 250cc bolus 3% given. LR switched to NS. hydralazine 25q8 started, 3% started, switched propofol to precedex   10/1: stability CTH done. Added labetalol, started TF. Palliative consulted. ethics consulted to determine surrogate. febrile 103, pan cx sent  10/2: BD 2, GEORGE overnight. TF resumed. Desatt'd to 80s, FiO2 inc. to 50. Fentanyl given, ABG, CXR ordered. Maxxed on precedex, started on propofol for DARIEN -4 - -5. Precedex dc'd. Duonebs, mucomyst, hypertonic added. 3% dc'd. Cardene dc'd. Start vanc/CTX. Increased labetalol 200q8. MRSA negative, dc'd vanc. ETT pulled back 2cm x 2, good positioning after confirmatory chest xray. Ethics attempting to establish HCP with family. Na 159, starting FW 250q6 for range 150-155.   10/3: BD3, GEORGE o/n, neuro stable. Na elevating, FW increased to 300q6. Dc'd bowel reg for diarrhea. vEEG started. SQH 5000q8 tonight.   10/4: BD 4, albumn bolus, incr. LR to 80 2/2 incr. in Cr, LR to 100cc/hr for uptrending Cr. Started 7% hypersal for 48hrs and SL atropine for inline/oral thick secretions. Dc'd CTX and started ancef for MSSA in the sputum. Nephrology consulted for CKD, f/u recs. SBP 170s, given hydralazine 10mg IVP.   10/5: BD5, o/n 10mg IVP hydralazine given for SBP 170s and started on hydralazine 25q8 via OGT. 10mg IV push labetalol for SBP > 160s. RT placed for diarrhea.   10/6: BD6, o/n FW increased to 350q4 per nephrology recs. IV tylenol for temp 100.6, SBp 160s presumed uncomfortable.   10/7: BD7, overnight pancultured for temp 101.8F.   10/8: BD8. GEORGE. Cr bumped. decreased LR to 75cc/hr. Adding simethicone ATC. incr hydralazine 50mgTID. Incr labetalol 300mgTID. Na 145, decreased FWF to 250q6. Start precedex. FENa consistent with intrinsic kidney injury. Pend repeat renal US. Retaining up to 1.3L, bladder scans q6, straight cath PRN  10/9: BD 9. GEORGE overnight. Neuro stable. abd xray for distention w non-specific gas pattern, OGT to LIWS for morning. duonebs/mucomyst to q8 for improving secretions. Changed tube feeds to Jevity 1.5 20cc/hr, low rate due to abdominal distention, nepro dense and more difficult to digest. Tolerating CPAP, confirmed by ABG.   10/10: BD 10. GEORGE overnight. Neuro stable. (+) gabriel for urinary retention on bladder scan. inc TF to goal rate of 40cc/hr. family leaning toward pursuing trach/PEG. 2 amp for FS 81.   10/11: BD 11. GEORGE overnight. Neuro stable. Trach/PEG consults placed.   10/12: BD 12. GEORGE overnight. Neuro stable. MRI brain complete.   10/13: BD 13. Increase flomax. Hold SQH after PM dose for trach tm. IVL.   10/14: BD 14. GEORGE overnight, remains on AC/VC. Gabriel placed for urinary retention. Dc'd free water.  S/p trach with pulm. NGT placed and CXR confirmed in good position.   10/15: BD 15, GEORGE ovn. resumed feeds. spiked 101, pan cx sent.   10/16: BD 16. GEORGE ovn. Lokelma 5mg for K+ 5.4. Started vanc q 24/zosyn for empiric PNA coverage, IVF to 100/hr. PEG held for fever.   10/17: BD 17,  ordered serum osm and urine osm for am. Started sinemet for neurostimulation. Increased cardura to 0.8. Started FW 100q4, dc'd IVF. MRSA negative, dc'd vanc. NGT replaced d/t coiling.   10/18: BD 18, GEORGE overnight, neuro stable. Amantadine added for neurostim. zosyn changed to unasyn for acinetobacter baumannii, failed TOV and required SC  10/19: BD 19, GEORGE ovn. cardura 2mg added for retention. labetalol decreased 200q8, hydralazine decreased 25q8. Gabriel replaced.   10/20: BD20, GEORGE overnight. NGT dislodged, replaced. PEG tomorrow w/ gen surg, FW increased to 150q4 and labetalol decreased to 100q8, lokelma given for hyperkalemia.   10/21: BD 21. POD0 PEG placement with Gen surg. decr labetolol to 50q8, incr. cardura to 0.4, started lokelma and phoslo, dc gabriel POD0 PEG placement with Gen surg.  10/22: BD 22. Plan to start TF today via PEG. dc labetalol, Following ophtho recs. Increased apnea settings - found to be in cheyne-moe respiration. CPAP .  10/23: BD 23. hydralazine d/c'd, trach collar trial today. Rectal tube placed at 6am.  10/24: BD24, o/n lokelma held due to diarrhea. Free water 100q6 resumed. dc'd tamsulosin, amantadine. Incr'd cardura to 8mg qhs. Dc'd FW. Switched jevity to nepro. gabriel placed for high urine output. Started SL atropine for oral secretions. Dc'd free water.  10/25: BD25, o/n decreased suctioning requirements to > q4hrs, GEORGE. Cr improving, cont phoslo, lokelma held at this time. Gabriel placed yest, cont. Tolerating trach collar. Given 500cc plasmalyte bolus for ANIKA. Dc'd sinemet.   10/26: BD26, o/n resumed lokelma 5mg daily and resumed 100cc free water q6hrs. Change in neuro status with new right pupillary dilation with anisocoria (right pupil 6mm fixed and left pupil 3mm briskly reactive). Given 23.4% NaCl bullet, taken for emergent CTH showing mostly resolved pontine hemorrhage, continued brainstem hypodensity likely edema d/t hemorrhage, no new hemorrhage or infarct, no herniation, mild increase in size of left lateral ventricle. Vitals remaine stable. Na goal > 140.   10/27: BD27, o/n GEORGE.Neuro stable. Pend stepdown with airway bed.   10/28: BD 28. GEORGE overnight. Neuro stable. Miralax ordered. Gabriel removed, pending TOV.  10/29: BD 29. GEORGE o/n. Given 2L NS over 8 hrs for increased BUN/Cr ratio. Gabriel placed for frequent straight cath.   10/30: BD 30.   10/31: BD 31. GEORGE overnight. Na 149, increased free water to 200q6. 1L NS for uptrending BUN.   : BD 32. GEORGE overnight. Given 1L NS for dehydration. Na 146, increased FW to 250q6.   : BD 33 GEORGE overnight, neuro stable, given 500cc bolus for net negative status and tachycardia   11/3: BD 34, GEORGE overnight, neuro stable. Patient remains tachycardic, EKG showing sinus tachycardia, given additional 500cc NS bolus. Febrile to 101.9F, pan cultured (without UA), CXR WNL, given tylenol.   : BD 35, GEORGE overnight, neuro stable. Given 1L NS for tachycardia. sputum (+) for stenotropohomas maltophilia.   : BD 36 GEORGE overnight, neuro stable. Vancomycin dc'd. Chest PT BID. ID consulted, cont zosyn.  : BD 37. blood cx + klebsiella dc zosyn changed to cefepime, CTAP ordered, rpt blood cx sent.    : BD 38. Pending CT A/P, given 250cc bolus and starting maintenance fluids overnight. Pending CT A/P after bolus   : BD 39. CT CAP negative for infection.   : BD 40. GEORGE overnight.  11/10: BD 41. GEORGE overnight. desat to 85 on trach collar, O2 inc to 10L and 100%, O2 sat inc to 95. pt tachy to 110s, euvolemic. given tylenol. ABG and CXR ordered. spiked fever, pancultured, RVP negative. AM ABG w pO2 79, rpt w pO2 79. pt appears comfortable, satting 94%.   : BD 42. GEORGE overnight. pt became tachy to 130s, desat to 90 on 100% FiO2 and 10L. suctioned, (+) productive cough. temp 101.4, given 1g IV tylenol and 500cc NS bolus for euvolemia. fever and HR downtrending. LE dopplers negative for dvt  : BD 43, GEORGE ovn, fever and HR downtrending, satting 97% 70% FIO2  : BD 44, GEORGE ovn. started standing tylenol x24 hours for tachycardia. desat to 80s, o2 increased. CXR stable, pending CTA PE protocol.   : BD 45, GEORGE overnight, neuro stable. resp therapy dec FiO2 to 70%.   11/15: BD 46, GEORGE overnight, neuro stable.  Rapid called for desaturation 30s, tachycardic 140s. Patient bagged, 100% fio2, heavily suctioned. CXR/POCUS unremarkable. ABG c/w desaturation. WBC 14.71. Afebrile. O2 improved to 90s and patient upgraded to ICU. ABG paO2 30s improved to 89 on vent. IV Tylenol x 1, sputum sent. Start protonix while o-n vent.   : BD 47. POCUS showed collapsable IVCF, given 1L bolus. Vanco/Cefpime added empriic for PNA, NGT feeds restarted. MRSA swab neg, Vanc DC'd.   : BD 48. GEORGE overnight. 1l bolus for tachycardia. Spiked to 101, cultured. 500cc bolus for tachycardia, tachy to 148 given 25mcg fentanyl, 250cc albumin, 1.5L bolus. 5 IV lopressor with response HR to 100s. +Stenotrophomonas on sputum cx.   : BD 49. GEORGE overnight. Consulted ID, cefepime switched to bactrim x7days. Started hydrochlorothiazine 12.5mg daily.  : BD 50. Tachy 120s, given tylenol and 500cc NS. Tolerating 5/5, switched to TCx. Holding phos binder. D/c Bactrim. D/c nery, f/u TOV. Dc'd PPI.   : BD 51. GEORGE ovn. 1600 satting low 90s, mildly tachy to 110s, afebrile, RR wnl. O2 improved to mid 90s while inc O2 to 100% on TCx. CPAP 5/5 placed back on.  : BD 52, GEORGE ovn, tolerating CPAP 5/5. Switch to trach collar during the day if tolerating well. HCTZ held for Cr bump, straight cath frequence increased to q4  : BD 53, GEORGE ovn. Resumed phoslo. Gabriel placed. Resumed HCTZ.   : BD 54. Holding tylenol in setting of possible fever, will require pan cx if febrile. Cr improved today. Cont CPAP. Bowel regimen held i/s/o diarrhea. FOBT negative.  : BD 55. GEORGE overnight. Neuro stable. HCTZ dc'ed, started lisinopril 5. Lokelma dc'ed for K 3.7.   : BD 56, GEORGE overnight. Neuro stable. dc'd lisinopril 5mg. Gabriel dc'd. TOV. 1545 noted to be hypotensive, MAP 50, in supine position on chair, HR 60s, afebrile, O2 96%. Given 1L cc NS bolus, placed back on bed in reverse trendelenberg, improved to map of 66. Neostick at bedside. Vitals check q1h. Dc'ed amlodipine. Failed TOV, bladder scan q6, sc prn. Added back Senna.   : BD 57, GEORGE overnight. Neuro stable. Dc'd phoslo.   : BD 58, GEORGE overnight. Neuro stable.    : BD 59. Gabriel replaced.   : GEORGE.  : GEORGE, neuro stable.   : BD 62, GEORGE overnight  : BD 63, GEORGE overnight.; Given 1L bolus of LR for uptrending BUN/Cr.  12/3: BD 64. Reinstated eye gtt/moisture chamber given increased Rt eye injection  : BD 65. GEORGE overnight. Attempted to speak with ophtho regarding eyelid weight/closure but no answer, full mailbox.   : BD 66, GEORGE overnight, bowel regimen increased and had BM.   : BD 67, GEORGE overnight, neuro stable.  : GEORGE overnight, neuro stable. /110s, given x1 hydralazine 10 mg IVP. Restarted home amlodipine 5mg.  : GEORGE. OOB to chair.     : GEORGE, mucomyst added for thick secretions, simethicone for abd distension, abd xray with stool burden, increased bowel regimen.   : GEORGE overnight.   : GEORGE overnight.   : GEORGE overnight  12/15: o/n Patient became tachycardic HR 120s and 10 minutes later O2 sat dropped as low as 89%. Patient suctioned without improvement in O2 sat and tube feeds found in suction catheter. TFs held.  STAT CXR ordered. STAT labs sent. Respiratory therapist called to bedside and patient trach connected to ventilator. After connection to ventilator and further suctioning O2 sat improved to 97% but patient HR remains 120-130s. Upgraded to NSICU for further management. Vancomycin and zosyn started. CTA  chest PE protocol and CTH ordered. Blood cultures sent.given 500cc bolus, rpt ABG sent pO2 243, CTH and CTA chest done. FS while NPO, FiO2 dec 50 pending ABG. sputum cx positive for few GPC and GNR.   12/15: GEORGE overnight     Vital Signs Last 24 Hrs  T(C): 36.4 (15 Dec 2024 22:00), Max: 36.7 (15 Dec 2024 04:12)  T(F): 97.5 (15 Dec 2024 22:00), Max: 98 (15 Dec 2024 04:12)  HR: 80 (16 Dec 2024 00:08) (70 - 122)  BP: 128/92 (16 Dec 2024 00:00) (115/85 - 156/107)  BP(mean): 104 (16 Dec 2024 00:00) (96 - 125)  RR: 14 (16 Dec 2024 00:00) (14 - 22)  SpO2: 97% (16 Dec 2024 00:08) (94% - 100%)    Parameters below as of 16 Dec 2024 00:00  Patient On (Oxygen Delivery Method): ventilator    O2 Concentration (%): 40    I&O's Detail    14 Dec 2024 07:01  -  15 Dec 2024 07:00  --------------------------------------------------------  IN:    Enteral Tube Flush: 90 mL    IV PiggyBack: 350 mL    Miscellaneous Tube Feedin mL    Sodium Chloride 0.9% Bolus: 500 mL  Total IN: 1660 mL    OUT:    Indwelling Catheter - Urethral (mL): 1140 mL    Voided (mL): 250 mL  Total OUT: 1390 mL    Total NET: 270 mL      15 Dec 2024 07:  -  16 Dec 2024 00:37  --------------------------------------------------------  IN:    Enteral Tube Flush: 60 mL    IV PiggyBack: 500 mL    Miscellaneous Tube Feedin mL    sodium chloride 0.9%: 750 mL    sodium chloride 0.9%: 200 mL  Total IN: 1550 mL    OUT:    Indwelling Catheter - Urethral (mL): 840 mL  Total OUT: 840 mL    Total NET: 710 mL        I&O's Summary    14 Dec 2024 07:01  -  15 Dec 2024 07:00  --------------------------------------------------------  IN: 1660 mL / OUT: 1390 mL / NET: 270 mL    15 Dec 2024 07:  -  16 Dec 2024 00:37  --------------------------------------------------------  IN: 1550 mL / OUT: 840 mL / NET: 710 mL        PHYSICAL EXAM:  Constitutional: Pt found in bed in NAD   ENT: PERRL, vertical EOMi, OE spontaneously   Respiratory: + trach to FVS, breathing non-labored, symmetrical chest wall movement  Cardiovascuar: RRR, no murmurs  Gastrointestinal: +PEG, abdomen soft, non tender  Neurological:  AAOX1 with eye movement, FC by opening mouth  Motor: RUE shows two fingers, RLE wiggles toes, LUE 05/, LLE withdraws from noxious   Sensation: unable to assess   Pronator Drift: unable to assess   Wounds/Drains: N/A    TUBES/LINES:  [] CVC  [] A-line  [] Lumbar Drain  [] Ventriculostomy  [x] Gabriel (replaced 12/15)  [] Other    DIET:  [] NPO  [] Mechanical  [x] Tube feeds    LABS:                        11.3   13.98 )-----------( 183      ( 15 Dec 2024 05:23 )             35.8     -15    140  |  103  |  41[H]  ----------------------------<  119[H]  3.9   |  24  |  1.19    Ca    8.5      15 Dec 2024 05:23  Phos  4.4     12-15  Mg     2.0     12-15      PT/INR - ( 15 Dec 2024 00:07 )   PT: 11.4 sec;   INR: 0.97          PTT - ( 15 Dec 2024 00:07 )  PTT:34.1 sec  Urinalysis Basic - ( 15 Dec 2024 05:23 )    Color: x / Appearance: x / SG: x / pH: x  Gluc: 119 mg/dL / Ketone: x  / Bili: x / Urobili: x   Blood: x / Protein: x / Nitrite: x   Leuk Esterase: x / RBC: x / WBC x   Sq Epi: x / Non Sq Epi: x / Bacteria: x          CAPILLARY BLOOD GLUCOSE      POCT Blood Glucose.: 114 mg/dL (15 Dec 2024 21:12)  POCT Blood Glucose.: 99 mg/dL (15 Dec 2024 15:29)  POCT Blood Glucose.: 97 mg/dL (15 Dec 2024 11:19)      Drug Levels: [] N/A    CSF Analysis: [] N/A      Allergies    Allergy Status Unknown    Intolerances      MEDICATIONS:  Antibiotics:  piperacillin/tazobactam IVPB.. 3.375 Gram(s) IV Intermittent every 8 hours  vancomycin  IVPB 1250 milliGRAM(s) IV Intermittent every 12 hours    Neuro:  acetaminophen   Oral Liquid .. 650 milliGRAM(s) Oral every 6 hours PRN    Anticoagulation:  heparin   Injectable 5000 Unit(s) SubCutaneous every 8 hours    OTHER:  acetylcysteine 10%  Inhalation 4 milliLiter(s) Inhalation every 6 hours  albuterol/ipratropium for Nebulization 3 milliLiter(s) Nebulizer every 6 hours  amLODIPine   Tablet 5 milliGRAM(s) Oral daily  artificial tears (preservative free) Ophthalmic Solution 1 Drop(s) Right EYE every 4 hours  chlorhexidine 0.12% Liquid 15 milliLiter(s) Oral Mucosa every 12 hours  chlorhexidine 2% Cloths 1 Application(s) Topical <User Schedule>  doxazosin 8 milliGRAM(s) Oral at bedtime  erythromycin   Ointment 1 Application(s) Right EYE at bedtime  insulin lispro (ADMELOG) corrective regimen sliding scale   SubCutaneous Before meals and at bedtime  ofloxacin 0.3% Solution 1 Drop(s) Right EYE four times a day  pantoprazole  Injectable 40 milliGRAM(s) IV Push daily  petrolatum Ophthalmic Ointment 1 Application(s) Both EYES two times a day  polyethylene glycol 3350 17 Gram(s) Oral every 12 hours  senna 2 Tablet(s) Oral every 12 hours  simethicone 80 milliGRAM(s) Chew three times a day    IVF:  sodium chloride 0.9%. 1000 milliLiter(s) IV Continuous <Continuous>    CULTURES:    RADIOLOGY & ADDITIONAL TESTS:      ASSESSMENT:  45 y/o PMH ?stroke/MI present to [facility] after collapsing at work. Decorticate posturing, vomiting, intubated for airway protection. 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 Transferred to [facility] for further management. s/p trach 10/14. s/p peg 10/21. Re-upgrade to ICU /2 desaturation event and suctioning requirements 11/15.     Neuro:  - neuro checks q4/vitals q1h  - pain control: tylenol prn  - vEEG (10/3-)- negative, (10/17-10/19) - negative  - CTH : enlarged pontine hemorrhage, CTH 10/3: stable, CTH 10/25: mostly resolved pontine hemorrhage  - MRI brain 10/12: parenchymal hemorrhage, acute/subacute R cerebellar stroke    - stroke core measures, stroke neuro signed off  - CTH 12/15 done, f/u read     CV:  - -160  - HTN: hold amlodipine 5mg, hold lisinopril 5mg   - echo () EF 75%  - pending repeat echo    Resp:  - trach to vent, FVS 40/400//  - CTA PE protocol 12/15 done, f/u read   - Secretions: duonebs/mucomyst/chest PT q8h   - CTA chest  negative for PE, inc ground glass opacities    GI:  - TF via PEG (placed 10/21 by gen surg)  - bowel regimen, last BM     Renal:  - NS @ 50  - Urinary retenion: Gabriel replaced 12/15  - CKD: trend BUN/Cr  - renal US 10/1: echogenicity c/w chronic med renal dz, repeat 10/8: inc renal echogeicity, c/f medical renal disease w increased hydro     Endo:  - A1c 5.4, ISS while NPO     Heme:  - DVT ppx: SCDs, SQH 5000u q8h   - LE dopplers negative , pending repeat dopplers     ID:  - vanc/zosyn started empirically 12/15  - f/u blood and sputum cultures 12/15  - last pancx , MRSA swab neg    - s/p Stenotrophomonas maltophilia PNA: s/p Bactrim (-) s/p Cefepime (-)  - 11/3, (+) sputum for stenotrophomas maltophlia, blood cx (+) klebsiella, cefepime 2gq12 ( - )   - empiric tx: s/p zosyn (11/3-) , s/p vanc  (11/3-)  - S/p Ancef (10/4-10/14) for PNA, and s/p Unasyn (10/18-10/23) +actinobacter baumanii     MISC:  - ophtho consult for keratitis  - erythromycin ointment to rt eye q4hrs, ofloxacin ointment to rt eye QID, artificial tears to rt eye q2hrs, moisture chamber at bedtime    Dispo: ICU status, full code, pending placement and guardianship hearing     D/w Dr. D'Amico and Dr. Tomlin

## 2024-12-17 LAB
-  LEVOFLOXACIN: SIGNIFICANT CHANGE UP
-  MINOCYCLINE: SIGNIFICANT CHANGE UP
-  TRIMETHOPRIM/SULFAMETHOXAZOLE: SIGNIFICANT CHANGE UP
ANION GAP SERPL CALC-SCNC: 10 MMOL/L — SIGNIFICANT CHANGE UP (ref 5–17)
ANION GAP SERPL CALC-SCNC: 10 MMOL/L — SIGNIFICANT CHANGE UP (ref 5–17)
BASE EXCESS BLDA CALC-SCNC: -3.9 MMOL/L — LOW (ref -2–3)
BUN SERPL-MCNC: 24 MG/DL — HIGH (ref 7–23)
BUN SERPL-MCNC: 28 MG/DL — HIGH (ref 7–23)
CALCIUM SERPL-MCNC: 8.1 MG/DL — LOW (ref 8.4–10.5)
CALCIUM SERPL-MCNC: 8.3 MG/DL — LOW (ref 8.4–10.5)
CHLORIDE SERPL-SCNC: 106 MMOL/L — SIGNIFICANT CHANGE UP (ref 96–108)
CHLORIDE SERPL-SCNC: 111 MMOL/L — HIGH (ref 96–108)
CO2 BLDA-SCNC: 23 MMOL/L — SIGNIFICANT CHANGE UP (ref 19–24)
CO2 SERPL-SCNC: 22 MMOL/L — SIGNIFICANT CHANGE UP (ref 22–31)
CO2 SERPL-SCNC: 22 MMOL/L — SIGNIFICANT CHANGE UP (ref 22–31)
CREAT SERPL-MCNC: 1.21 MG/DL — SIGNIFICANT CHANGE UP (ref 0.5–1.3)
CREAT SERPL-MCNC: 1.25 MG/DL — SIGNIFICANT CHANGE UP (ref 0.5–1.3)
CULTURE RESULTS: ABNORMAL
EGFR: 72 ML/MIN/1.73M2 — SIGNIFICANT CHANGE UP
EGFR: 72 ML/MIN/1.73M2 — SIGNIFICANT CHANGE UP
EGFR: 75 ML/MIN/1.73M2 — SIGNIFICANT CHANGE UP
EGFR: 75 ML/MIN/1.73M2 — SIGNIFICANT CHANGE UP
GLUCOSE SERPL-MCNC: 100 MG/DL — HIGH (ref 70–99)
GLUCOSE SERPL-MCNC: 156 MG/DL — HIGH (ref 70–99)
HCO3 BLDA-SCNC: 22 MMOL/L — SIGNIFICANT CHANGE UP (ref 21–28)
HCT VFR BLD CALC: 28.2 % — LOW (ref 39–50)
HCT VFR BLD CALC: 29.2 % — LOW (ref 39–50)
HGB BLD-MCNC: 8.9 G/DL — LOW (ref 13–17)
HGB BLD-MCNC: 9.4 G/DL — LOW (ref 13–17)
LACTATE SERPL-SCNC: 1.1 MMOL/L — SIGNIFICANT CHANGE UP (ref 0.5–2)
MAGNESIUM SERPL-MCNC: 1.8 MG/DL — SIGNIFICANT CHANGE UP (ref 1.6–2.6)
MAGNESIUM SERPL-MCNC: 2 MG/DL — SIGNIFICANT CHANGE UP (ref 1.6–2.6)
MCHC RBC-ENTMCNC: 29.4 PG — SIGNIFICANT CHANGE UP (ref 27–34)
MCHC RBC-ENTMCNC: 30.6 PG — SIGNIFICANT CHANGE UP (ref 27–34)
MCHC RBC-ENTMCNC: 31.6 G/DL — LOW (ref 32–36)
MCHC RBC-ENTMCNC: 32.2 G/DL — SIGNIFICANT CHANGE UP (ref 32–36)
MCV RBC AUTO: 93.1 FL — SIGNIFICANT CHANGE UP (ref 80–100)
MCV RBC AUTO: 95.1 FL — SIGNIFICANT CHANGE UP (ref 80–100)
METHOD TYPE: SIGNIFICANT CHANGE UP
METHOD TYPE: SIGNIFICANT CHANGE UP
NRBC # BLD: 0 /100 WBCS — SIGNIFICANT CHANGE UP (ref 0–0)
NRBC # BLD: 0 /100 WBCS — SIGNIFICANT CHANGE UP (ref 0–0)
NRBC BLD-RTO: 0 /100 WBCS — SIGNIFICANT CHANGE UP (ref 0–0)
NRBC BLD-RTO: 0 /100 WBCS — SIGNIFICANT CHANGE UP (ref 0–0)
ORGANISM # SPEC MICROSCOPIC CNT: ABNORMAL
ORGANISM # SPEC MICROSCOPIC CNT: ABNORMAL
ORGANISM # SPEC MICROSCOPIC CNT: SIGNIFICANT CHANGE UP
PCO2 BLDA: 40 MMHG — SIGNIFICANT CHANGE UP (ref 35–48)
PH BLDA: 7.34 — LOW (ref 7.35–7.45)
PHOSPHATE SERPL-MCNC: 2.8 MG/DL — SIGNIFICANT CHANGE UP (ref 2.5–4.5)
PHOSPHATE SERPL-MCNC: 3.3 MG/DL — SIGNIFICANT CHANGE UP (ref 2.5–4.5)
PLATELET # BLD AUTO: 153 K/UL — SIGNIFICANT CHANGE UP (ref 150–400)
PLATELET # BLD AUTO: 179 K/UL — SIGNIFICANT CHANGE UP (ref 150–400)
PO2 BLDA: 129 MMHG — HIGH (ref 83–108)
POTASSIUM SERPL-MCNC: 3.3 MMOL/L — LOW (ref 3.5–5.3)
POTASSIUM SERPL-MCNC: 3.7 MMOL/L — SIGNIFICANT CHANGE UP (ref 3.5–5.3)
POTASSIUM SERPL-SCNC: 3.3 MMOL/L — LOW (ref 3.5–5.3)
POTASSIUM SERPL-SCNC: 3.7 MMOL/L — SIGNIFICANT CHANGE UP (ref 3.5–5.3)
RBC # BLD: 3.03 M/UL — LOW (ref 4.2–5.8)
RBC # BLD: 3.07 M/UL — LOW (ref 4.2–5.8)
RBC # FLD: 14.7 % — HIGH (ref 10.3–14.5)
RBC # FLD: 14.8 % — HIGH (ref 10.3–14.5)
SAO2 % BLDA: 98.4 % — HIGH (ref 94–98)
SODIUM SERPL-SCNC: 138 MMOL/L — SIGNIFICANT CHANGE UP (ref 135–145)
SODIUM SERPL-SCNC: 143 MMOL/L — SIGNIFICANT CHANGE UP (ref 135–145)
SPECIMEN SOURCE: SIGNIFICANT CHANGE UP
TROPONIN T, HIGH SENSITIVITY RESULT: 53 NG/L — CRITICAL HIGH (ref 0–51)
VANCOMYCIN FLD-MCNC: 25.4 UG/ML
WBC # BLD: 6.55 K/UL — SIGNIFICANT CHANGE UP (ref 3.8–10.5)
WBC # BLD: 6.67 K/UL — SIGNIFICANT CHANGE UP (ref 3.8–10.5)
WBC # FLD AUTO: 6.55 K/UL — SIGNIFICANT CHANGE UP (ref 3.8–10.5)
WBC # FLD AUTO: 6.67 K/UL — SIGNIFICANT CHANGE UP (ref 3.8–10.5)

## 2024-12-17 PROCEDURE — 99291 CRITICAL CARE FIRST HOUR: CPT

## 2024-12-17 RX ORDER — LEVETIRACETAM 10 MG/ML
1000 INJECTION, SOLUTION INTRAVENOUS ONCE
Refills: 0 | Status: COMPLETED | OUTPATIENT
Start: 2024-12-17 | End: 2024-12-17

## 2024-12-17 RX ORDER — ALBUMIN (HUMAN) 12.5 G/50ML
250 INJECTION, SOLUTION INTRAVENOUS ONCE
Refills: 0 | Status: COMPLETED | OUTPATIENT
Start: 2024-12-17 | End: 2024-12-17

## 2024-12-17 RX ORDER — INSULIN LISPRO 100 U/ML
INJECTION, SOLUTION INTRAVENOUS; SUBCUTANEOUS
Refills: 0 | Status: DISCONTINUED | OUTPATIENT
Start: 2024-12-17 | End: 2024-12-18

## 2024-12-17 RX ORDER — LEVETIRACETAM 10 MG/ML
2000 INJECTION, SOLUTION INTRAVENOUS ONCE
Refills: 0 | Status: DISCONTINUED | OUTPATIENT
Start: 2024-12-17 | End: 2024-12-17

## 2024-12-17 RX ORDER — METOPROLOL SUCCINATE 50 MG/1
5 TABLET, EXTENDED RELEASE ORAL ONCE
Refills: 0 | Status: COMPLETED | OUTPATIENT
Start: 2024-12-17 | End: 2024-12-17

## 2024-12-17 RX ORDER — PIPERACILLIN-TAZO-DEXTROSE,ISO 3.375G/5
3.38 IV SOLUTION, PIGGYBACK PREMIX FROZEN(ML) INTRAVENOUS EVERY 8 HOURS
Refills: 0 | Status: COMPLETED | OUTPATIENT
Start: 2024-12-17 | End: 2024-12-20

## 2024-12-17 RX ORDER — LORAZEPAM 4 MG/ML
2 VIAL (ML) INJECTION ONCE
Refills: 0 | Status: DISCONTINUED | OUTPATIENT
Start: 2024-12-17 | End: 2024-12-17

## 2024-12-17 RX ORDER — MAGNESIUM SULFATE 500 MG/ML
1 SYRINGE (ML) INJECTION ONCE
Refills: 0 | Status: COMPLETED | OUTPATIENT
Start: 2024-12-17 | End: 2024-12-17

## 2024-12-17 RX ORDER — ACETAMINOPHEN 500 MG/5ML
1000 LIQUID (ML) ORAL ONCE
Refills: 0 | Status: COMPLETED | OUTPATIENT
Start: 2024-12-17 | End: 2024-12-17

## 2024-12-17 RX ORDER — SOD PHOS DI, MONO/K PHOS MONO 250 MG
1 TABLET ORAL ONCE
Refills: 0 | Status: COMPLETED | OUTPATIENT
Start: 2024-12-17 | End: 2024-12-17

## 2024-12-17 RX ADMIN — IPRATROPIUM BROMIDE AND ALBUTEROL SULFATE 3 MILLILITER(S): .5; 2.5 SOLUTION RESPIRATORY (INHALATION) at 11:00

## 2024-12-17 RX ADMIN — Medication 2 MILLIGRAM(S): at 13:27

## 2024-12-17 RX ADMIN — Medication 100 GRAM(S): at 15:34

## 2024-12-17 RX ADMIN — Medication 40 MILLIEQUIVALENT(S): at 07:14

## 2024-12-17 RX ADMIN — FLUTICASONE PROPIONATE 1 SPRAY(S): 50 SPRAY, METERED NASAL at 05:26

## 2024-12-17 RX ADMIN — HEPARIN SODIUM 5000 UNIT(S): 1000 INJECTION INTRAVENOUS; SUBCUTANEOUS at 14:21

## 2024-12-17 RX ADMIN — ACETYLCYSTEINE 4 MILLILITER(S): 200 INHALANT RESPIRATORY (INHALATION) at 11:00

## 2024-12-17 RX ADMIN — ACETYLCYSTEINE 4 MILLILITER(S): 200 INHALANT RESPIRATORY (INHALATION) at 05:16

## 2024-12-17 RX ADMIN — Medication 75 MILLILITER(S): at 15:47

## 2024-12-17 RX ADMIN — LEVETIRACETAM 400 MILLIGRAM(S): 10 INJECTION, SOLUTION INTRAVENOUS at 13:46

## 2024-12-17 RX ADMIN — AMLODIPINE BESYLATE 5 MILLIGRAM(S): 10 TABLET ORAL at 05:28

## 2024-12-17 RX ADMIN — IPRATROPIUM BROMIDE AND ALBUTEROL SULFATE 3 MILLILITER(S): .5; 2.5 SOLUTION RESPIRATORY (INHALATION) at 05:16

## 2024-12-17 RX ADMIN — ERYTHROMYCIN 1 APPLICATION(S): 5 OINTMENT OPHTHALMIC at 22:28

## 2024-12-17 RX ADMIN — Medication 15 MILLILITER(S): at 17:07

## 2024-12-17 RX ADMIN — Medication 1 APPLICATION(S): at 17:08

## 2024-12-17 RX ADMIN — Medication 25 GRAM(S): at 22:28

## 2024-12-17 RX ADMIN — Medication 1 DROP(S): at 07:14

## 2024-12-17 RX ADMIN — Medication 1 DROP(S): at 17:07

## 2024-12-17 RX ADMIN — Medication 1 DROP(S): at 04:40

## 2024-12-17 RX ADMIN — Medication 1 APPLICATION(S): at 05:27

## 2024-12-17 RX ADMIN — Medication 25 GRAM(S): at 09:10

## 2024-12-17 RX ADMIN — Medication 1 DROP(S): at 19:52

## 2024-12-17 RX ADMIN — Medication 1 PACKET(S): at 15:34

## 2024-12-17 RX ADMIN — FLUTICASONE PROPIONATE 1 SPRAY(S): 50 SPRAY, METERED NASAL at 17:07

## 2024-12-17 RX ADMIN — METOPROLOL SUCCINATE 5 MILLIGRAM(S): 50 TABLET, EXTENDED RELEASE ORAL at 13:15

## 2024-12-17 RX ADMIN — Medication 50 MILLILITER(S): at 10:09

## 2024-12-17 RX ADMIN — Medication 40 MILLIEQUIVALENT(S): at 09:10

## 2024-12-17 RX ADMIN — Medication 15 MILLILITER(S): at 05:28

## 2024-12-17 RX ADMIN — Medication 1 DROP(S): at 11:00

## 2024-12-17 RX ADMIN — HEPARIN SODIUM 5000 UNIT(S): 1000 INJECTION INTRAVENOUS; SUBCUTANEOUS at 22:29

## 2024-12-17 RX ADMIN — OFLOXACIN 1 DROP(S): 3 SOLUTION OPHTHALMIC at 17:08

## 2024-12-17 RX ADMIN — Medication 1 SPRAY(S): at 05:26

## 2024-12-17 RX ADMIN — Medication 1 SPRAY(S): at 17:06

## 2024-12-17 RX ADMIN — OFLOXACIN 1 DROP(S): 3 SOLUTION OPHTHALMIC at 05:25

## 2024-12-17 RX ADMIN — Medication 40 MILLIGRAM(S): at 11:00

## 2024-12-17 RX ADMIN — Medication 40 MILLIEQUIVALENT(S): at 15:34

## 2024-12-17 RX ADMIN — Medication 1000 MILLILITER(S): at 13:16

## 2024-12-17 RX ADMIN — DOXAZOSIN MESYLATE 8 MILLIGRAM(S): 8 TABLET ORAL at 22:28

## 2024-12-17 RX ADMIN — Medication 400 MILLIGRAM(S): at 13:16

## 2024-12-17 RX ADMIN — IPRATROPIUM BROMIDE AND ALBUTEROL SULFATE 3 MILLILITER(S): .5; 2.5 SOLUTION RESPIRATORY (INHALATION) at 17:18

## 2024-12-17 RX ADMIN — Medication 1 APPLICATION(S): at 05:26

## 2024-12-17 RX ADMIN — Medication 1000 MILLIGRAM(S): at 13:31

## 2024-12-17 RX ADMIN — Medication 25 GRAM(S): at 00:03

## 2024-12-17 RX ADMIN — Medication 25 GRAM(S): at 17:07

## 2024-12-17 RX ADMIN — ALBUMIN (HUMAN) 125 MILLILITER(S): 12.5 INJECTION, SOLUTION INTRAVENOUS at 22:56

## 2024-12-17 RX ADMIN — ACETYLCYSTEINE 4 MILLILITER(S): 200 INHALANT RESPIRATORY (INHALATION) at 22:13

## 2024-12-17 RX ADMIN — OFLOXACIN 1 DROP(S): 3 SOLUTION OPHTHALMIC at 11:00

## 2024-12-17 RX ADMIN — IPRATROPIUM BROMIDE AND ALBUTEROL SULFATE 3 MILLILITER(S): .5; 2.5 SOLUTION RESPIRATORY (INHALATION) at 22:13

## 2024-12-17 RX ADMIN — HEPARIN SODIUM 5000 UNIT(S): 1000 INJECTION INTRAVENOUS; SUBCUTANEOUS at 05:28

## 2024-12-17 RX ADMIN — Medication 2 MILLIGRAM(S): at 13:16

## 2024-12-17 RX ADMIN — ACETYLCYSTEINE 4 MILLILITER(S): 200 INHALANT RESPIRATORY (INHALATION) at 17:07

## 2024-12-17 NOTE — PROVIDER CONTACT NOTE (OTHER) - ASSESSMENT
Pt having sustained HR in 130/s, /79)93) RR 19, 100% on VC/AC. Pt able to follow commands on lowers. Noted to have new mouth twitching.

## 2024-12-17 NOTE — CHART NOTE - NSCHARTNOTEFT_GEN_A_CORE
Admitting Diagnosis:   Patient is a 44y old  Male who presents with a chief complaint of brain stem bleed (17 Dec 2024 03:04)  PAST MEDICAL & SURGICAL HISTORY:  Acute myocardial infarction  CVA (cerebral vascular accident)      Current Nutrition Order: NPO with tube feeding via gastrostomy: [employer] Renal Support 1.8: at goal of 60mL/hour x 18 hours, providing 1080mL total volume, 1944 calories, 86g protein, ~713mL free water; this meets ~24 calories/kg ideal body weight, ~1.1g protein/kg ideal body weight; with Banatrol Tube Feeding 2x/day    PO Intake: NPO with tube feeds meeting 100% estimated nutrient needs    GI Issues: fecal incontinence; loose consistency stools noted per nursing; BM noted on 12/17/24; no other GI upset noted    Pain: Absence of non-verbal indicators of pain    Skin Integrity: bruised (ecchymosis); no pressure ulcers, noted with right arm and right hand trace edema, left arm and hang pitting 2+ edema; PEG present; Mookie: 14      12-16-24 @ 07:01  -  12-17-24 @ 07:00  --------------------------------------------------------  IN: 2220 mL / OUT: 1296 mL / NET: 924 mL    12-17-24 @ 07:01  -  12-17-24 @ 10:44  --------------------------------------------------------  IN: 470 mL / OUT: 200 mL / NET: 270 mL        Labs:   12-17    138  |  106  |  28[H]  ----------------------------<  100[H]  3.3[L]   |  22  |  1.25    Ca    8.3[L]      17 Dec 2024 05:21  Phos  3.3     12-17  Mg     2.0     12-17    CAPILLARY BLOOD GLUCOSE    POCT Blood Glucose.: 83 mg/dL (16 Dec 2024 11:21)    Medications:  MEDICATIONS  (STANDING):  acetylcysteine 10%  Inhalation 4 milliLiter(s) Inhalation every 6 hours  albuterol/ipratropium for Nebulization 3 milliLiter(s) Nebulizer every 6 hours  amLODIPine   Tablet 5 milliGRAM(s) Oral daily  artificial tears (preservative free) Ophthalmic Solution 1 Drop(s) Right EYE every 4 hours  chlorhexidine 0.12% Liquid 15 milliLiter(s) Oral Mucosa every 12 hours  chlorhexidine 2% Cloths 1 Application(s) Topical <User Schedule>  doxazosin 8 milliGRAM(s) Oral at bedtime  erythromycin   Ointment 1 Application(s) Right EYE at bedtime  fluticasone propionate 50 MICROgram(s)/spray Nasal Spray 1 Spray(s) Both Nostrils two times a day  heparin   Injectable 5000 Unit(s) SubCutaneous every 8 hours  ofloxacin 0.3% Solution 1 Drop(s) Right EYE four times a day  pantoprazole  Injectable 40 milliGRAM(s) IV Push daily  petrolatum Ophthalmic Ointment 1 Application(s) Both EYES two times a day  piperacillin/tazobactam IVPB.. 3.375 Gram(s) IV Intermittent every 8 hours  sodium chloride 0.65% Nasal 1 Spray(s) Both Nostrils two times a day  sodium chloride 0.9%. 1000 milliLiter(s) (50 mL/Hr) IV Continuous <Continuous>    MEDICATIONS  (PRN):  acetaminophen   Oral Liquid .. 650 milliGRAM(s) Oral every 6 hours PRN Temp greater or equal to 38.5C (101.3F), Mild Pain (1 - 3)    Dosing Anthropometrics:   Height for BMI (FEET)	5 Feet  Height for BMI (INCHES)	8 Inch(s)  Height for BMI (CM)	172.72 Centimeter(s)  Weight for BMI (lbs)	176.4 lb  Weight for BMI (kg)	80 kg  Body Mass Index	              26.8  ideal body weight:               154 pounds / 70 kg   %ideal body weight             114%     Weight Change: No new wt obtained since admit, strongly recommend nursing to obtain new wt to track/ trend changes     Estimated energy needs:  Weight used for calculations	ideal body weight  Estimated Energy Needs Weight (lbs)	154 lb  Estimated Energy Needs Weight (kg)	69.8 kg  Estimated Energy Needs From (christiana/kg)	25  Estimated Energy Needs To (christiana/kg)	30  Estimated Energy Needs Calculated From (christiana/kg)	1745  Estimated Energy Needs Calculated To (christiana/kg)	2094  Weight used for calculations	ideal body weight  Estimated Protein Needs Weight (lbs)	154 lb  Estimated Protein Needs Weight (kg)	69.8 kg  Estimated Protein Needs From (g/kg)	1.2  Estimated Protein Needs To (g/kg)	1.4  Estimated Protein Needs Calculated From (g/kg)	83.76  Estimated Protein Needs Calculated To (g/kg)	97.72  Estimated Fluid Needs Weight (lbs)	154 lb  Estimated Fluid Needs Weight (kg)	69.8 kg  Estimated Fluid Needs From (ml/kg)	25  Estimated Fluid Needs To (ml/kg)	30  Estimated Fluid Needs Calculated From (ml/kg)	1745  Estimated Fluid Needs Calculated To (ml/kg)	2094  Other Calculations	Pt is >100% ideal body weight, stepped back up to the ICU, thus ideal body weight used for all calculations. Needs adjusted for age, clinical course.     Subjective: This is a 44 year old male, unknown past medical history (reported stroke and MI by coworkers) presented to [facility] with AMS, Pt was working at "[employer] when he was found down by coworkers. EMS called and pt brought to [facility] ED. Intubated, sedated, started on cardene for SBPs in 200s. CT head showed brain stem bleed. Transferred to NSICU for further management.    Patient seen at bedside on 8 East for nutrition follow up. Pt afebrile, no drips/pressor support, on ventilator, CPAP mode (SpO2 %). Current diet order: remains NPO with tube feeds meeting 100% estimated nutrient needs. Pt is receiving [employer] Renal 1.8 formula, via PEG, pt's feeds at goal of 60mL/hour x 18 hours, with Banatrol 2x/day. Noted with fecal incontinence and some loose stools, BM on 12/17/24. Absence of non-verbal indicators of pain. Skin noted with bruising (ecchymosis); no pressure ulcers, noted with right arm and right hand trace edema, left arm and hang pitting 2+ edema. Labs reviewed: elevated BUN (28), low potassium (3.3), calcium (8.3), other electrolytes and other labs are within normal limits at this time; medical team is aware. RDN will continue to follow. Please see nutrition recommendations below.    Previous Nutrition Diagnosis: Inadequate Oral Intake related to inability to meet nutritional demands via PO as evidenced by NPO with tube feedings    Active [  x ]  Resolved [   ]    Goal: Pt will consume >/= 75% of all meals and supplements via tolerated route    Nutrition Recommendations:  1. NPO with tube feedings, continue with current tube feed regimen to continue to meet 100% estimated needs:  **[employer] Renal Support 1.8: at goal of 60mL/hour x 18 hours, providing 1080mL total volume, 1944 calories, 86g protein, ~713mL free water; this meets ~24 calories/kg ideal body weight, ~1.1g protein/kg ideal body weight**  **Provide Banatrol Tube Feeding prn**  **Maintain aspiration precautions at all times; monitor for signs/symptoms of intolerance**  2. Monitor weights (weekly), GI function, skin integrity; electrolytes, BMP, glucose, CBC per MD discretion *renal indices*  3. Pain and bowel regimen per medical team  4. Align nutrition with goals of care at all times  5. Recommend nursing to obtain new weights to track/trend changes    Risk Level: High [ x ] Moderate [  ] Low [   ]. Admitting Diagnosis:   Patient is a 44y old  Male who presents with a chief complaint of brain stem bleed (17 Dec 2024 03:04)  PAST MEDICAL & SURGICAL HISTORY:  Acute myocardial infarction  CVA (cerebral vascular accident)      Current Nutrition Order: NPO with tube feeding via gastrostomy: Maribeth Sauceda Renal Support 1.8: at goal of 60mL/hour x 18 hours, providing 1080mL total volume, 1944 calories, 86g protein, ~713mL free water; this meets ~24 calories/kg ideal body weight, ~1.1g protein/kg ideal body weight; with Banatrol Tube Feeding 2x/day    PO Intake: NPO with tube feeds meeting 100% estimated nutrient needs    GI Issues: fecal incontinence; loose consistency stools noted per nursing; BM noted on 12/17/24; no other GI upset noted    Pain: Absence of non-verbal indicators of pain    Skin Integrity: bruised (ecchymosis); no pressure ulcers, noted with right arm and right hand trace edema, left arm and hang pitting 2+ edema; PEG present; Mookie: 14      12-16-24 @ 07:01  -  12-17-24 @ 07:00  --------------------------------------------------------  IN: 2220 mL / OUT: 1296 mL / NET: 924 mL    12-17-24 @ 07:01  -  12-17-24 @ 10:44  --------------------------------------------------------  IN: 470 mL / OUT: 200 mL / NET: 270 mL        Labs:   12-17    138  |  106  |  28[H]  ----------------------------<  100[H]  3.3[L]   |  22  |  1.25    Ca    8.3[L]      17 Dec 2024 05:21  Phos  3.3     12-17  Mg     2.0     12-17    CAPILLARY BLOOD GLUCOSE    POCT Blood Glucose.: 83 mg/dL (16 Dec 2024 11:21)    Medications:  MEDICATIONS  (STANDING):  acetylcysteine 10%  Inhalation 4 milliLiter(s) Inhalation every 6 hours  albuterol/ipratropium for Nebulization 3 milliLiter(s) Nebulizer every 6 hours  amLODIPine   Tablet 5 milliGRAM(s) Oral daily  artificial tears (preservative free) Ophthalmic Solution 1 Drop(s) Right EYE every 4 hours  chlorhexidine 0.12% Liquid 15 milliLiter(s) Oral Mucosa every 12 hours  chlorhexidine 2% Cloths 1 Application(s) Topical <User Schedule>  doxazosin 8 milliGRAM(s) Oral at bedtime  erythromycin   Ointment 1 Application(s) Right EYE at bedtime  fluticasone propionate 50 MICROgram(s)/spray Nasal Spray 1 Spray(s) Both Nostrils two times a day  heparin   Injectable 5000 Unit(s) SubCutaneous every 8 hours  ofloxacin 0.3% Solution 1 Drop(s) Right EYE four times a day  pantoprazole  Injectable 40 milliGRAM(s) IV Push daily  petrolatum Ophthalmic Ointment 1 Application(s) Both EYES two times a day  piperacillin/tazobactam IVPB.. 3.375 Gram(s) IV Intermittent every 8 hours  sodium chloride 0.65% Nasal 1 Spray(s) Both Nostrils two times a day  sodium chloride 0.9%. 1000 milliLiter(s) (50 mL/Hr) IV Continuous <Continuous>    MEDICATIONS  (PRN):  acetaminophen   Oral Liquid .. 650 milliGRAM(s) Oral every 6 hours PRN Temp greater or equal to 38.5C (101.3F), Mild Pain (1 - 3)    Dosing Anthropometrics:   Height for BMI (FEET)	5 Feet  Height for BMI (INCHES)	8 Inch(s)  Height for BMI (CM)	172.72 Centimeter(s)  Weight for BMI (lbs)	176.4 lb  Weight for BMI (kg)	80 kg  Body Mass Index	              26.8  ideal body weight:               154 pounds / 70 kg   %ideal body weight             114%     Weight Change: No new wt obtained since admit, strongly recommend nursing to obtain new wt to track/ trend changes     Estimated energy needs:  Weight used for calculations	ideal body weight  Estimated Energy Needs Weight (lbs)	154 lb  Estimated Energy Needs Weight (kg)	69.8 kg  Estimated Energy Needs From (christiana/kg)	25  Estimated Energy Needs To (christiana/kg)	30  Estimated Energy Needs Calculated From (christiana/kg)	1745  Estimated Energy Needs Calculated To (christiana/kg)	2094  Weight used for calculations	ideal body weight  Estimated Protein Needs Weight (lbs)	154 lb  Estimated Protein Needs Weight (kg)	69.8 kg  Estimated Protein Needs From (g/kg)	1.2  Estimated Protein Needs To (g/kg)	1.4  Estimated Protein Needs Calculated From (g/kg)	83.76  Estimated Protein Needs Calculated To (g/kg)	97.72  Estimated Fluid Needs Weight (lbs)	154 lb  Estimated Fluid Needs Weight (kg)	69.8 kg  Estimated Fluid Needs From (ml/kg)	25  Estimated Fluid Needs To (ml/kg)	30  Estimated Fluid Needs Calculated From (ml/kg)	1745  Estimated Fluid Needs Calculated To (ml/kg)	2094  Other Calculations	Pt is >100% ideal body weight, stepped back up to the ICU, thus ideal body weight used for all calculations. Needs adjusted for age, clinical course.     Subjective: This is a 44 year old male, unknown past medical history (reported stroke and MI by coworkers) presented to [facility] with AMS, Pt was working at [employer] when he was found down by coworkers. EMS called and pt brought to [facility] ED. Intubated, sedated, started on cardene for SBPs in 200s. CT head showed brain stem bleed. Transferred to NSICU for further management.    Patient seen at bedside on 8 [facility] for nutrition follow up. Pt afebrile, no drips/pressor support, on ventilator, CPAP mode (SpO2 %). Current diet order: remains NPO with tube feeds meeting 100% estimated nutrient needs. Pt is receiving [employer] Renal 1.8 formula, via PEG, pt's feeds at goal of 60mL/hour x 18 hours, with Banatrol 2x/day. Noted with fecal incontinence and some loose stools, BM on 12/17/24. Absence of non-verbal indicators of pain. Skin noted with bruising (ecchymosis); no pressure ulcers, noted with right arm and right hand trace edema, left arm and hang pitting 2+ edema. Labs reviewed: elevated BUN (28), low potassium (3.3), calcium (8.3), other electrolytes and other labs are within normal limits at this time; medical team is aware. Education: deferred at this time related to pt's confused cognitive status. RD to follow up and remain available prn. RDN will continue to follow. Please see nutrition recommendations below.    Previous Nutrition Diagnosis: Inadequate Oral Intake related to inability to meet nutritional demands via PO as evidenced by NPO with tube feedings    Active [  x ]  Resolved [   ]    Goal: Pt will consume >/= 75% of all meals and supplements via tolerated route    Nutrition Recommendations:  1. NPO with tube feedings, continue with current tube feed regimen to continue to meet 100% estimated needs:  **[employer] Renal Support 1.8: at goal of 60mL/hour x 18 hours, providing 1080mL total volume, 1944 calories, 86g protein, ~713mL free water; this meets ~24 calories/kg ideal body weight, ~1.1g protein/kg ideal body weight**  **Provide Banatrol Tube Feeding prn**  **Maintain aspiration precautions at all times; monitor for signs/symptoms of intolerance**  2. Monitor weights (weekly), GI function, skin integrity; electrolytes, BMP, glucose, CBC per MD discretion *renal indices*  3. Pain and bowel regimen per medical team  4. Align nutrition with goals of care at all times  5. Recommend nursing to obtain new weights to track/trend changes    Education: deferred at this time related to pt's confused cognitive status. RD to follow up and remain available prn.     Risk Level: High [ x ] Moderate [  ] Low [   ].

## 2024-12-17 NOTE — PROGRESS NOTE ADULT - SUBJECTIVE AND OBJECTIVE BOX
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 s/p peg 10/21. Prolonged hospital stay inability to transfer to Rehab due to lack of insurance.   On 12/15 AM while in Stepdown unit patient became tachycardic HR 120s and 10 minutes later O2 sat dropped as low as 89%. Patient suctioned without improvement in O2 sat and tube feeds found in suction catheter. TFs held.  STAT CXR ordered. STAT labs sent. Respiratory therapist called to bedside and patient trach connected to ventilator. After connection to ventilator and further suctioning O2 sat improved to 97% but patient HR remains 120-130s. Upgraded to NSICU for further management.      ICU Vital Signs Last 24 Hrs  T(C): 36.4 (17 Dec 2024 18:55), Max: 36.8 (16 Dec 2024 21:07)  T(F): 97.6 (17 Dec 2024 18:55), Max: 98.3 (16 Dec 2024 21:07)  HR: 102 (17 Dec 2024 19:01) (59 - 137)  BP: 136/96 (17 Dec 2024 19:00) (111/78 - 158/107)  BP(mean): 112 (17 Dec 2024 19:00) (90 - 128)  RR: 17 (17 Dec 2024 19:01) (9 - 20)  SpO2: 100% (17 Dec 2024 19:01) (95% - 100%)      12-16-24 @ 07:01  -  12-17-24 @ 07:00  --------------------------------------------------------  IN: 2220 mL / OUT: 1296 mL / NET: 924 mL    12-17-24 @ 07:01  -  12-17-24 @ 19:36  --------------------------------------------------------  IN: 2780 mL / OUT: 1410 mL / NET: 1370 mL      Mode: AC/ CMV (Assist Control/ Continuous Mandatory Ventilation), RR (machine): 14, TV (machine): 400, FiO2: 40, PEEP: 6, ITime: 1, MAP: 8.7, PIP: 15      PHYSICAL EXAM:  Gen: Awake, trach to vent  Neurological exam   Mental status: Awake, attend, oriented x 2 with choices by looking down, does not fully track but able to do so with left eye,  (EOMI impaired on R), following commands  CV: S1/S2, RRR  Lungs: Decreased breath sounds bilaterally, no wheezing  Abdomen: Bowel sounds  Extremities:  No edema      MEDICATIONS:   acetaminophen   Oral Liquid .. 650 milliGRAM(s) Oral every 6 hours PRN  acetylcysteine 10%  Inhalation 4 milliLiter(s) Inhalation every 6 hours  albuterol/ipratropium for Nebulization 3 milliLiter(s) Nebulizer every 6 hours  amLODIPine   Tablet 5 milliGRAM(s) Oral daily  artificial tears (preservative free) Ophthalmic Solution 1 Drop(s) Right EYE every 4 hours  chlorhexidine 0.12% Liquid 15 milliLiter(s) Oral Mucosa every 12 hours  chlorhexidine 2% Cloths 1 Application(s) Topical <User Schedule>  doxazosin 8 milliGRAM(s) Oral at bedtime  erythromycin   Ointment 1 Application(s) Right EYE at bedtime  fluticasone propionate 50 MICROgram(s)/spray Nasal Spray 1 Spray(s) Both Nostrils two times a day  heparin   Injectable 5000 Unit(s) SubCutaneous every 8 hours  insulin lispro (ADMELOG) corrective regimen sliding scale   SubCutaneous Before meals and at bedtime  ofloxacin 0.3% Solution 1 Drop(s) Right EYE four times a day  pantoprazole  Injectable 40 milliGRAM(s) IV Push daily  petrolatum Ophthalmic Ointment 1 Application(s) Both EYES two times a day  piperacillin/tazobactam IVPB.. 3.375 Gram(s) IV Intermittent every 8 hours  sodium chloride 0.65% Nasal 1 Spray(s) Both Nostrils two times a day  sodium chloride 0.9%. 1000 milliLiter(s) (75 mL/Hr) IV Continuous <Continuous>    LABS:                     8.9    6.67  )-----------( 153      ( 17 Dec 2024 13:41 )             28.2     12-17    143  |  111[H]  |  24[H]  ----------------------------<  156[H]  3.7   |  22  |  1.21    Ca    8.1[L]      17 Dec 2024 13:41  Phos  2.8     12-17  Mg     1.8     12-17      ABG - ( 17 Dec 2024 14:01 )  pH, Arterial: 7.34  pH, Blood: x     /  pCO2: 40    /  pO2: 129   / HCO3: 22    / Base Excess: -3.9  /  SaO2: 98.4          ASSESSMENT AND PLAN  43 y/o PMH ?stroke/MI   Admitted with 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   S/P trach 10/14. s/p peg 10/21. Re-upgrade to ICU 2/2 desaturation event and suctioning requirements 11/15. Re-upgrade again on 12/15 for desaturation event.      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 hypovolemia?/hypoxia/respiratory distress?). RESOLVED.  CTA chest PE no PE in major vessels  -160. Vitals Q2  Holding amlodipine 5mg and lisinopril 5mg; resume as necessary  TTE (9/30) EF 75%. Repeat   Troponin; 51-> 75->  EKG 12/17  Telemetry monitoring.    RESPIRATION: Acute hypoxic respiratory failure requiring mechanical ventilation.   Full vent support/lung protective ventilation -> CPAP 12/16  Aggressive chest PT/duonebs, aggressive pulmonary toilet  CTA chest PA study negative for PE in major vessels; did show near-complete to complete LLL atelectasis. Partial RLL atelectasis  Consider pulm consult if difficulty weaning/ pulm optimization    GI; Episode of vomiting with aspiration   TF via PEG (placed 10/21 by gen surg). Trickle feeds. Advance as tolerated  Bowel regimen, last BM 12/14  PPI while on positive pressure  Abdominal Xray negative for ileus    RENAL: ANIKA on CKD. Resolved  Urine retention: Replaced gabriel catheter 12/15  IVF 50cc/hr. IVL once tolerating  Free water flushes  CKD: Trend BUN/Cr  Renal US 10/1: echogenicity consistent with chronic med renal disease, repeat 10/8. Increased renal echogenicity consistent with medical renal disease with increased hydronephrosis    ENDO:  A1c 5.4  Avoid hypoglycemia. Fingersticks, ISS    HEME:  DVT ppx: SCDs, SQH 5000u q8h   LE dopplers negative 11/11  CTA chest PA study negative for PE    ID: Tachycardic/hypoxic, aspiration event. Suspect aspiration PNA  On Vanc and Zosyn  Pending results of sputum/blood cultures   S/P stenotrophomonas maltophilia PNA: s/p Bactrim (11/18-11/19) s/p Cefepime (11/16-11/18)  11/3, (+) sputum for stenotrophomas maltophlia, blood cx (+) klebsiella, cefepime 2gq12 (11/6 - 11/12)   Empiric tx: s/p zosyn (11/3-11/6) , s/p vanc  (11/3-11/5)  S/P Ancef (10/4-10/14) for PNA, and s/p Unasyn (10/18-10/23) +actinobacter baumanii     MISC:  Ophthalmology consulted for keratitis  Erythromycin ointment to rt eye q4hrs, ofloxacin ointment to rt eye QID, artificial tears to rt eye q2hrs, moisture chamber at bedtime    MISC:    SOCIAL/FAMILY:  [] awaiting [x] updated at bedside [] family meeting    CODE STATUS:  [x] Full Code [] DNR [] DNI [] Palliative/Comfort Care    DISPOSITION:  [x] ICU [] Stroke Unit [] Floor [] EMU [] RCU [] PCU    Time spent: 60 critical care minutes                           NSCU ATTENDING -- ADDITIONAL PROGRESS NOTE    Nighttime rounds were performed       HPI   Patient is a 45 y/o male with PMH ?stroke/MI initially admitted on 9/30 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 s/p peg 10/21. Prolonged hospital stay inability to transfer to Rehab due to lack of insurance.   On 12/15 AM while in Stepdown unit patient became tachycardic HR 120s and 10 minutes later O2 sat dropped as low as 89%. Patient suctioned without improvement in O2 sat and tube feeds found in suction catheter. TFs held.  STAT CXR ordered. STAT labs sent. Respiratory therapist called to bedside and patient trach connected to ventilator. After connection to ventilator and further suctioning O2 sat improved to 97% but patient HR remains 120-130s. Upgraded to NSICU for further management.      ICU Vital Signs Last 24 Hrs  T(C): 36.4 (17 Dec 2024 18:55), Max: 36.8 (16 Dec 2024 21:07)  T(F): 97.6 (17 Dec 2024 18:55), Max: 98.3 (16 Dec 2024 21:07)  HR: 102 (17 Dec 2024 19:01) (59 - 137)  BP: 136/96 (17 Dec 2024 19:00) (111/78 - 158/107)  BP(mean): 112 (17 Dec 2024 19:00) (90 - 128)  RR: 17 (17 Dec 2024 19:01) (9 - 20)  SpO2: 100% (17 Dec 2024 19:01) (95% - 100%)      12-16-24 @ 07:01  -  12-17-24 @ 07:00  --------------------------------------------------------  IN: 2220 mL / OUT: 1296 mL / NET: 924 mL    12-17-24 @ 07:01  -  12-17-24 @ 19:36  --------------------------------------------------------  IN: 2780 mL / OUT: 1410 mL / NET: 1370 mL      Mode: AC/ CMV (Assist Control/ Continuous Mandatory Ventilation), RR (machine): 14, TV (machine): 400, FiO2: 40, PEEP: 6, ITime: 1, MAP: 8.7, PIP: 15      PHYSICAL EXAM:  Gen: Awake, trach to vent  Neurological exam   Mental status: Awake, attends, not following commands 12/17  (EOMI impaired on R)  CV: S1/S2, RRR  Lungs: Decreased breath sounds bilaterally, no wheezing  Abdomen: Bowel sounds  Extremities:  No edema      MEDICATIONS:   acetaminophen   Oral Liquid .. 650 milliGRAM(s) Oral every 6 hours PRN  acetylcysteine 10%  Inhalation 4 milliLiter(s) Inhalation every 6 hours  albuterol/ipratropium for Nebulization 3 milliLiter(s) Nebulizer every 6 hours  amLODIPine   Tablet 5 milliGRAM(s) Oral daily  artificial tears (preservative free) Ophthalmic Solution 1 Drop(s) Right EYE every 4 hours  chlorhexidine 0.12% Liquid 15 milliLiter(s) Oral Mucosa every 12 hours  chlorhexidine 2% Cloths 1 Application(s) Topical <User Schedule>  doxazosin 8 milliGRAM(s) Oral at bedtime  erythromycin   Ointment 1 Application(s) Right EYE at bedtime  fluticasone propionate 50 MICROgram(s)/spray Nasal Spray 1 Spray(s) Both Nostrils two times a day  heparin   Injectable 5000 Unit(s) SubCutaneous every 8 hours  insulin lispro (ADMELOG) corrective regimen sliding scale   SubCutaneous Before meals and at bedtime  ofloxacin 0.3% Solution 1 Drop(s) Right EYE four times a day  pantoprazole  Injectable 40 milliGRAM(s) IV Push daily  petrolatum Ophthalmic Ointment 1 Application(s) Both EYES two times a day  piperacillin/tazobactam IVPB.. 3.375 Gram(s) IV Intermittent every 8 hours  sodium chloride 0.65% Nasal 1 Spray(s) Both Nostrils two times a day  sodium chloride 0.9%. 1000 milliLiter(s) (75 mL/Hr) IV Continuous <Continuous>    LABS:                     8.9    6.67  )-----------( 153      ( 17 Dec 2024 13:41 )             28.2     12-17    143  |  111[H]  |  24[H]  ----------------------------<  156[H]  3.7   |  22  |  1.21    Ca    8.1[L]      17 Dec 2024 13:41  Phos  2.8     12-17  Mg     1.8     12-17      ABG - ( 17 Dec 2024 14:01 )  pH, Arterial: 7.34  pH, Blood: x     /  pCO2: 40    /  pO2: 129   / HCO3: 22    / Base Excess: -3.9  /  SaO2: 98.4          ASSESSMENT AND PLAN  45 y/o PMH ?stroke/MI   Admitted with 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   S/P trach 10/14. s/p peg 10/21. Re-upgrade to ICU 2/2 desaturation event and suctioning requirements 11/15. Re-upgrade again on 12/15 for desaturation event.      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 hypovolemia?/hypoxia/respiratory distress?). RESOLVED.  CTA chest PE no PE in major vessels  -160. Vitals Q2  Holding amlodipine 5mg and lisinopril 5mg; resume as necessary  TTE (9/30) EF 75%. Repeat   Troponin; 51-> 75->  EKG 12/17  Telemetry monitoring.    RESPIRATION: Acute hypoxic respiratory failure requiring mechanical ventilation.   Full vent support/lung protective ventilation -> CPAP 12/16  Aggressive chest PT/duonebs, aggressive pulmonary toilet  CTA chest PA study negative for PE in major vessels; did show near-complete to complete LLL atelectasis. Partial RLL atelectasis  Consider pulm consult if difficulty weaning/ pulm optimization    GI; Episode of vomiting with aspiration   TF via PEG (placed 10/21 by gen surg). Trickle feeds. Advance as tolerated  Bowel regimen, last BM 12/14  PPI while on positive pressure  Abdominal Xray negative for ileus    RENAL: ANIKA on CKD. Resolved  Urine retention: Replaced gabriel catheter 12/15  IVF 50cc/hr. IVL once tolerating  Free water flushes  CKD: Trend BUN/Cr  Renal US 10/1: echogenicity consistent with chronic med renal disease, repeat 10/8. Increased renal echogenicity consistent with medical renal disease with increased hydronephrosis    ENDO:  A1c 5.4  Avoid hypoglycemia. Fingersticks, ISS    HEME:  DVT ppx: SCDs, SQH 5000u q8h   LE dopplers negative 11/11  CTA chest PA study negative for PE    ID: Tachycardic/hypoxic, aspiration event. Suspect aspiration PNA  On Vanc and Zosyn  Pending results of sputum/blood cultures   S/P stenotrophomonas maltophilia PNA: s/p Bactrim (11/18-11/19) s/p Cefepime (11/16-11/18)  11/3, (+) sputum for stenotrophomas maltophlia, blood cx (+) klebsiella, cefepime 2gq12 (11/6 - 11/12)   Empiric tx: s/p zosyn (11/3-11/6) , s/p vanc  (11/3-11/5)  S/P Ancef (10/4-10/14) for PNA, and s/p Unasyn (10/18-10/23) +actinobacter baumanii     MISC:  Ophthalmology consulted for keratitis  Erythromycin ointment to rt eye q4hrs, ofloxacin ointment to rt eye QID, artificial tears to rt eye q2hrs, moisture chamber at bedtime    MISC:    SOCIAL/FAMILY:  [] awaiting [x] updated at bedside [] family meeting    CODE STATUS:  [x] Full Code [] DNR [] DNI [] Palliative/Comfort Care    DISPOSITION:  [x] ICU [] Stroke Unit [] Floor [] EMU [] RCU [] PCU    Time spent: 60 critical care minutes                           NSCU ATTENDING -- ADDITIONAL PROGRESS NOTE    Nighttime rounds were performed       HPI   Patient is a 43 y/o male with PMH ?stroke/MI initially admitted on 9/30 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 s/p peg 10/21. Prolonged hospital stay inability to transfer to Rehab due to lack of insurance.   On 12/15 AM while in Stepdown unit patient became tachycardic HR 120s and 10 minutes later O2 sat dropped as low as 89%. Patient suctioned without improvement in O2 sat and tube feeds found in suction catheter. TFs held.  STAT CXR ordered. STAT labs sent. Respiratory therapist called to bedside and patient trach connected to ventilator. After connection to ventilator and further suctioning O2 sat improved to 97% but patient HR remains 120-130s. Upgraded to NSICU for further management.      ICU Vital Signs Last 24 Hrs  T(C): 36.4 (17 Dec 2024 18:55), Max: 36.8 (16 Dec 2024 21:07)  T(F): 97.6 (17 Dec 2024 18:55), Max: 98.3 (16 Dec 2024 21:07)  HR: 102 (17 Dec 2024 19:01) (59 - 137)  BP: 136/96 (17 Dec 2024 19:00) (111/78 - 158/107)  BP(mean): 112 (17 Dec 2024 19:00) (90 - 128)  RR: 17 (17 Dec 2024 19:01) (9 - 20)  SpO2: 100% (17 Dec 2024 19:01) (95% - 100%)      12-16-24 @ 07:01  -  12-17-24 @ 07:00  --------------------------------------------------------  IN: 2220 mL / OUT: 1296 mL / NET: 924 mL    12-17-24 @ 07:01  -  12-17-24 @ 19:36  --------------------------------------------------------  IN: 2780 mL / OUT: 1410 mL / NET: 1370 mL      Mode: AC/ CMV (Assist Control/ Continuous Mandatory Ventilation), RR (machine): 14, TV (machine): 400, FiO2: 40, PEEP: 6, ITime: 1, MAP: 8.7, PIP: 15      PHYSICAL EXAM:  Gen: Awake, trach to vent  Neurological exam   Mental status: Awake, attends, not following commands 12/17  (EOMI impaired on R)  CV: S1/S2, RRR  Lungs: Decreased breath sounds bilaterally, no wheezing  Abdomen: Bowel sounds  Extremities:  No edema      MEDICATIONS:   acetaminophen   Oral Liquid .. 650 milliGRAM(s) Oral every 6 hours PRN  acetylcysteine 10%  Inhalation 4 milliLiter(s) Inhalation every 6 hours  albuterol/ipratropium for Nebulization 3 milliLiter(s) Nebulizer every 6 hours  amLODIPine   Tablet 5 milliGRAM(s) Oral daily  artificial tears (preservative free) Ophthalmic Solution 1 Drop(s) Right EYE every 4 hours  chlorhexidine 0.12% Liquid 15 milliLiter(s) Oral Mucosa every 12 hours  chlorhexidine 2% Cloths 1 Application(s) Topical <User Schedule>  doxazosin 8 milliGRAM(s) Oral at bedtime  erythromycin   Ointment 1 Application(s) Right EYE at bedtime  fluticasone propionate 50 MICROgram(s)/spray Nasal Spray 1 Spray(s) Both Nostrils two times a day  heparin   Injectable 5000 Unit(s) SubCutaneous every 8 hours  insulin lispro (ADMELOG) corrective regimen sliding scale   SubCutaneous Before meals and at bedtime  ofloxacin 0.3% Solution 1 Drop(s) Right EYE four times a day  pantoprazole  Injectable 40 milliGRAM(s) IV Push daily  petrolatum Ophthalmic Ointment 1 Application(s) Both EYES two times a day  piperacillin/tazobactam IVPB.. 3.375 Gram(s) IV Intermittent every 8 hours  sodium chloride 0.65% Nasal 1 Spray(s) Both Nostrils two times a day  sodium chloride 0.9%. 1000 milliLiter(s) (75 mL/Hr) IV Continuous <Continuous>    LABS:                     8.9    6.67  )-----------( 153      ( 17 Dec 2024 13:41 )             28.2     12-17    143  |  111[H]  |  24[H]  ----------------------------<  156[H]  3.7   |  22  |  1.21    Ca    8.1[L]      17 Dec 2024 13:41  Phos  2.8     12-17  Mg     1.8     12-17      ABG - ( 17 Dec 2024 14:01 )  pH, Arterial: 7.34  pH, Blood: x     /  pCO2: 40    /  pO2: 129   / HCO3: 22    / Base Excess: -3.9  /  SaO2: 98.4          ASSESSMENT AND PLAN:  43 y/o PMH ?stroke/MI   Admitted with 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   S/P trach 10/14. S/P PEG 10/21. Re-upgrade to ICU 2/2 desaturation event and suctioning requirements 11/15. Re-upgrade again on 12/15 for desaturation event.      NEURO: Locked in syndrome   Neurochecks Q4h  Analgesia: Tylenol prn  CT head stable  Activity: PT/OT     CV; Tachycardia in the setting of hypoxia - (PE? hypovolemia?/hypoxia/respiratory distress?)  CTA chest PE no PE in major vessels  -160. Vitals Q2  TTE (9/30) EF 75%. Repeat   Troponin; 51-> 75->    RESPIRATION: Acute hypoxic respiratory failure requiring mechanical ventilation.   Full vent support/lung protective ventilation  Aggressive chest PT/duonebs, aggressive pulmonary toilet  CTA chest PA study negative for PE in major vessels; did show near-complete to complete LLL atelectasis. Partial RLL atelectasis  Consider pulm consult if difficulty weaning/ pulm optimization    GI:  TF: Trickle feeds. Advance as tolerated  Bowel regimen, last BM 12/17  PPI   Abdominal Xray negative for ileus    RENAL: ANIKA on CKD. Resolved  Urine retention: Replaced gabriel catheter 12/15  Free water flushes  CKD: Trend BUN/Cr    ENDO:  A1c 5.4  Avoid hypoglycemia. Fingersticks, ISS    HEME:  DVT ppx: SCDs, SQH 5000u q8h   LE dopplers negative 11/11  CTA chest PA study negative for PE    ID: Tachycardic/hypoxic, aspiration event. Suspect aspiration PNA  Zosyn. Vanc DCed (supratherapeutic)  Pending results of sputum/blood cultures      MISC:  Ophthalmology consulted for keratitis  Erythromycin ointment to rt eye q4hrs, ofloxacin ointment to rt eye QID, artificial tears to rt eye q2hrs, moisture chamber at bedtime    MISC:    SOCIAL/FAMILY:  [] awaiting [x] updated at bedside [] family meeting    CODE STATUS:  [x] Full Code [] DNR [] DNI [] Palliative/Comfort Care    DISPOSITION:  [x] ICU [] Stroke Unit [] Floor [] EMU [] RCU [] PCU    Time spent: 60 critical care minutes

## 2024-12-17 NOTE — PROGRESS NOTE ADULT - SUBJECTIVE AND OBJECTIVE BOX
NEUROCRITICAL CARE PROGRESS NOTE    GARETT DELEON   MRN-[id number]  Summary:  Vancomycin Level, Trough: 37.3 ug/mL ( @ 16:23)  /  HPI:  Unknown age male, unknown past medical history (reported stroke and MI by coworkers) presented to [facility] with AMS, Pt was working at [employer] when he was found down by coworkers. EMS called and pt brought to [facility] ED. Intubated, sedated, started on cardene for SBPs in 200s. CT head showed brain stem bleed. Transferred to NSICU for further management.  (30 Sep 2024 12:55)      S/Overnight events:   GEORGE overnight, neuro stable.     Hospital Course:  : transferred from [facility]. A line placed. Versed dc'd. Cy Rader at bedside, states pt has family in [place], cannot confirm medications or PMH other than stroke and MI. 250cc bolus 3% given. LR switched to NS. hydralazine 25q8 started, 3% started, switched propofol to precedex   10/1: stability CTH done. Added labetalol, started TF. Palliative consulted. ethics consulted to determine surrogate. febrile 103, pan cx sent  10/2: BD 2, GEORGE overnight. TF resumed. Desatt'd to 80s, FiO2 inc. to 50. Fentanyl given, ABG, CXR ordered. Maxxed on precedex, started on propofol for DARIEN -4 - -5. Precedex dc'd. Duonebs, mucomyst, hypertonic added. 3% dc'd. Cardene dc'd. Start vanc/CTX. Increased labetalol 200q8. MRSA negative, dc'd vanc. ETT pulled back 2cm x 2, good positioning after confirmatory chest xray. Ethics attempting to establish HCP with family. Na 159, starting FW 250q6 for range 150-155.   10/3: BD3, GEORGE o/n, neuro stable. Na elevating, FW increased to 300q6. Dc'd bowel reg for diarrhea. vEEG started. SQH 5000q8 tonight.   10/4: BD 4, albumn bolus, incr. LR to 80 2/2 incr. in Cr, LR to 100cc/hr for uptrending Cr. Started 7% hypersal for 48hrs and SL atropine for inline/oral thick secretions. Dc'd CTX and started ancef for MSSA in the sputum. Nephrology consulted for CKD, f/u recs. SBP 170s, given hydralazine 10mg IVP.   10/5: BD5, o/n 10mg IVP hydralazine given for SBP 170s and started on hydralazine 25q8 via OGT. 10mg IV push labetalol for SBP > 160s. RT placed for diarrhea.   10/6: BD6, o/n FW increased to 350q4 per nephrology recs. IV tylenol for temp 100.6, SBp 160s presumed uncomfortable.   10/7: BD7, overnight pancultured for temp 101.8F.   10/8: BD8. GEORGE. Cr bumped. decreased LR to 75cc/hr. Adding simethicone ATC. incr hydralazine 50mgTID. Incr labetalol 300mgTID. Na 145, decreased FWF to 250q6. Start precedex. FENa consistent with intrinsic kidney injury. Pend repeat renal US. Retaining up to 1.3L, bladder scans q6, straight cath PRN  10/9: BD 9. GEORGE overnight. Neuro stable. abd xray for distention w non-specific gas pattern, OGT to LIWS for morning. duonebs/mucomyst to q8 for improving secretions. Changed tube feeds to Jevity 1.5 20cc/hr, low rate due to abdominal distention, nepro dense and more difficult to digest. Tolerating CPAP, confirmed by ABG.   10/10: BD 10. GEORGE overnight. Neuro stable. (+) gabriel for urinary retention on bladder scan. inc TF to goal rate of 40cc/hr. family leaning toward pursuing trach/PEG. 1/2 amp for FS 81.   10/11: BD 11. GEORGE overnight. Neuro stable. Trach/PEG consults placed.   10/12: BD 12. GEORGE overnight. Neuro stable. MRI brain complete.   10/13: BD 13. Increase flomax. Hold SQH after PM dose for trach tm. IVL.   10/14: BD 14. GEORGE overnight, remains on AC/VC. Gabriel placed for urinary retention. Dc'd free water.  S/p trach with pulm. NGT placed and CXR confirmed in good position.   10/15: BD 15, GEORGE ovn. resumed feeds. spiked 101, pan cx sent.   10/16: BD 16. GEORGE ovn. Lokelma 5mg for K+ 5.4. Started vanc q 24/zosyn for empiric PNA coverage, IVF to 100/hr. PEG held for fever.   10/17: BD 17,  ordered serum osm and urine osm for am. Started sinemet for neurostimulation. Increased cardura to 0.8. Started FW 100q4, dc'd IVF. MRSA negative, dc'd vanc. NGT replaced d/t coiling.   10/18: BD 18, GEORGE overnight, neuro stable. Amantadine added for neurostim. zosyn changed to unasyn for acinetobacter baumannii, failed TOV and required SC  10/19: BD 19, GEORGE ovn. cardura 2mg added for retention. labetalol decreased 200q8, hydralazine decreased 25q8. Gabriel replaced.   10/20: BD20, GEORGE overnight. NGT dislodged, replaced. PEG tomorrow w/ gen surg, FW increased to 150q4 and labetalol decreased to 100q8, lokelma given for hyperkalemia.   10/21: BD 21. POD0 PEG placement with Gen surg. decr labetolol to 50q8, incr. cardura to 0.4, started lokelma and phoslo, dc gabriel POD0 PEG placement with Gen surg.  10/22: BD 22. Plan to start TF today via PEG. dc labetalol, Following ophtho recs. Increased apnea settings - found to be in cheyne-moe respiration. CPAP .  10/23: BD 23. hydralazine d/c'd, trach collar trial today. Rectal tube placed at 6am.  10/24: BD24, o/n lokelma held due to diarrhea. Free water 100q6 resumed. dc'd tamsulosin, amantadine. Incr'd cardura to 8mg qhs. Dc'd FW. Switched jevity to nepro. gabriel placed for high urine output. Started SL atropine for oral secretions. Dc'd free water.  10/25: BD25, o/n decreased suctioning requirements to > q4hrs, GEORGE. Cr improving, cont phoslo, lokelma held at this time. Gabriel placed yest, cont. Tolerating trach collar. Given 500cc plasmalyte bolus for ANIKA. Dc'd sinemet.   10/26: BD26, o/n resumed lokelma 5mg daily and resumed 100cc free water q6hrs. Change in neuro status with new right pupillary dilation with anisocoria (right pupil 6mm fixed and left pupil 3mm briskly reactive). Given 23.4% NaCl bullet, taken for emergent CTH showing mostly resolved pontine hemorrhage, continued brainstem hypodensity likely edema d/t hemorrhage, no new hemorrhage or infarct, no herniation, mild increase in size of left lateral ventricle. Vitals remaine stable. Na goal > 140.   10/27: BD27, o/n GEORGE.Neuro stable. Pend stepdown with airway bed.   10/28: BD 28. GEORGE overnight. Neuro stable. Miralax ordered. Gabriel removed, pending TOV.  10/29: BD 29. GEORGE o/n. Given 2L NS over 8 hrs for increased BUN/Cr ratio. Gabriel placed for frequent straight cath.   10/30: BD 30.   10/31: BD 31. GEORGE overnight. Na 149, increased free water to 200q6. 1L NS for uptrending BUN.   : BD 32. GEORGE overnight. Given 1L NS for dehydration. Na 146, increased FW to 250q6.   : BD 33 GEORGE overnight, neuro stable, given 500cc bolus for net negative status and tachycardia   11/3: BD 34, GEORGE overnight, neuro stable. Patient remains tachycardic, EKG showing sinus tachycardia, given additional 500cc NS bolus. Febrile to 101.9F, pan cultured (without UA), CXR WNL, given tylenol.   : BD 35, GEORGE overnight, neuro stable. Given 1L NS for tachycardia. sputum (+) for stenotropohomas maltophilia.   : BD 36 GEORGE overnight, neuro stable. Vancomycin dc'd. Chest PT BID. ID consulted, cont zosyn.  : BD 37. blood cx + klebsiella dc zosyn changed to cefepime, CTAP ordered, rpt blood cx sent.    : BD 38. Pending CT A/P, given 250cc bolus and starting maintenance fluids overnight. Pending CT A/P after bolus   : BD 39. CT CAP negative for infection.   : BD 40. GEORGE overnight.  11/10: BD 41. GEORGE overnight. desat to 85 on trach collar, O2 inc to 10L and 100%, O2 sat inc to 95. pt tachy to 110s, euvolemic. given tylenol. ABG and CXR ordered. spiked fever, pancultured, RVP negative. AM ABG w pO2 79, rpt w pO2 79. pt appears comfortable, satting 94%.   : BD 42. GEORGE overnight. pt became tachy to 130s, desat to 90 on 100% FiO2 and 10L. suctioned, (+) productive cough. temp 101.4, given 1g IV tylenol and 500cc NS bolus for euvolemia. fever and HR downtrending. LE dopplers negative for dvt  : BD 43, GEORGE ovn, fever and HR downtrending, satting 97% 70% FIO2  : BD 44, GEORGE ovn. started standing tylenol x24 hours for tachycardia. desat to 80s, o2 increased. CXR stable, pending CTA PE protocol.   : BD 45, GEORGE overnight, neuro stable. resp therapy dec FiO2 to 70%.   11/15: BD 46, GEORGE overnight, neuro stable.  Rapid called for desaturation 30s, tachycardic 140s. Patient bagged, 100% fio2, heavily suctioned. CXR/POCUS unremarkable. ABG c/w desaturation. WBC 14.71. Afebrile. O2 improved to 90s and patient upgraded to ICU. ABG paO2 30s improved to 89 on vent. IV Tylenol x 1, sputum sent. Start protonix while o-n vent.   : BD 47. POCUS showed collapsable IVCF, given 1L bolus. Vanco/Cefpime added empriic for PNA, NGT feeds restarted. MRSA swab neg, Vanc DC'd.   : BD 48. GEORGE overnight. 1l bolus for tachycardia. Spiked to 101, cultured. 500cc bolus for tachycardia, tachy to 148 given 25mcg fentanyl, 250cc albumin, 1.5L bolus. 5 IV lopressor with response HR to 100s. +Stenotrophomonas on sputum cx.   : BD 49. GEORGE overnight. Consulted ID, cefepime switched to bactrim x7days. Started hydrochlorothiazine 12.5mg daily.  : BD 50. Tachy 120s, given tylenol and 500cc NS. Tolerating 5/5, switched to TCx. Holding phos binder. D/c Bactrim. D/c gabriel, f/u TOV. Dc'd PPI.   : BD 51. GEORGE ovn. 1600 satting low 90s, mildly tachy to 110s, afebrile, RR wnl. O2 improved to mid 90s while inc O2 to 100% on TCx. CPAP 5/5 placed back on.  : BD 52, GEORGE ovn, tolerating CPAP 5/5. Switch to trach collar during the day if tolerating well. HCTZ held for Cr bump, straight cath frequence increased to q4  : BD 53, GEORGE ovn. Resumed phoslo. Gabriel placed. Resumed HCTZ.   : BD 54. Holding tylenol in setting of possible fever, will require pan cx if febrile. Cr improved today. Cont CPAP. Bowel regimen held i/s/o diarrhea. FOBT negative.  : BD 55. GEORGE overnight. Neuro stable. HCTZ dc'ed, started lisinopril 5. Lokelma dc'ed for K 3.7.   : BD 56, GEORGE overnight. Neuro stable. dc'd lisinopril 5mg. Gabriel dc'd. TOV. 1545 noted to be hypotensive, MAP 50, in supine position on chair, HR 60s, afebrile, O2 96%. Given 1L cc NS bolus, placed back on bed in reverse trendelenberg, improved to map of 66. Neostick at bedside. Vitals check q1h. Dc'ed amlodipine. Failed TOV, bladder scan q6, sc prn. Added back Senna.   : BD 57, GEORGE overnight. Neuro stable. Dc'd phoslo.   : BD 58, GEORGE overnight. Neuro stable.    : BD 59. Gabriel replaced.   : GEORGE.  : GEORGE, neuro stable.   : BD 62, GEORGE overnight  : BD 63, GEORGE overnight.; Given 1L bolus of LR for uptrending BUN/Cr.  12/3: BD 64. Reinstated eye gtt/moisture chamber given increased Rt eye injection  : BD 65. GEORGE overnight. Attempted to speak with ophtho regarding eyelid weight/closure but no answer, full mailbox.   : BD 66, GEORGE overnight, bowel regimen increased and had BM.   : BD 67, GEORGE overnight, neuro stable.  : GEORGE overnight, neuro stable. /110s, given x1 hydralazine 10 mg IVP. Restarted home amlodipine 5mg.  : GEORGE. OOB to chair.     : GEORGE, mucomyst added for thick secretions, simethicone for abd distension, abd xray with stool burden, increased bowel regimen.   : GEORGE overnight.   : GEORGE overnight.   : GEORGE overnight  12/15: o/n Patient became tachycardic HR 120s and 10 minutes later O2 sat dropped as low as 89%. Patient suctioned without improvement in O2 sat and tube feeds found in suction catheter. TFs held.  STAT CXR ordered. STAT labs sent. Respiratory therapist called to bedside and patient trach connected to ventilator. After connection to ventilator and further suctioning O2 sat improved to 97% but patient HR remains 120-130s. Upgraded to NSICU for further management. Vancomycin and zosyn started. CTA  chest PE protocol and CTH ordered. Blood cultures sent.given 500cc bolus, rpt ABG sent pO2 243, CTH and CTA chest done. FS while NPO, FiO2 dec 50 pending ABG. sputum cx positive for few GPC and GNR.   : GEORGE overnight, restarted amlodipine, troponin 75   : GEORGE overnight, neuro stable.     Vital Signs Last 24 Hrs  T(C): 36.8 (16 Dec 2024 21:07), Max: 36.8 (16 Dec 2024 14:20)  T(F): 98.3 (16 Dec 2024 21:07), Max: 98.3 (16 Dec 2024 21:07)  HR: 72 (17 Dec 2024 01:00) (60 - 78)  BP: 124/85 (17 Dec 2024 01:00) (111/78 - 149/101)  BP(mean): 99 (17 Dec 2024 01:00) (89 - 120)  RR: 15 (17 Dec 2024 01:00) (13 - 20)  SpO2: 98% (17 Dec 2024 01:00) (98% - 100%)    Parameters below as of 17 Dec 2024 02:00  Patient On (Oxygen Delivery Method): ventilator    O2 Concentration (%): 40    Mode: AC/ CMV (Assist Control/ Continuous Mandatory Ventilation), RR (machine): 14, TV (machine): 400, FiO2: 40, PEEP: 6, ITime: 1, MAP: 8, PIP: 13    I&O's Detail    15 Dec 2024 07:01  -  16 Dec 2024 07:00  --------------------------------------------------------  IN:    Enteral Tube Flush: 160 mL    IV PiggyBack: 800 mL    Miscellaneous Tube Feedin mL    sodium chloride 0.9%: 750 mL    sodium chloride 0.9%: 500 mL  Total IN: 2290 mL    OUT:    Indwelling Catheter - Urethral (mL): 1245 mL  Total OUT: 1245 mL    Total NET: 1045 mL      16 Dec 2024 07:01  -  17 Dec 2024 03:04  --------------------------------------------------------  IN:    Enteral Tube Flush: 100 mL    IV PiggyBack: 175 mL    Miscellaneous Tube Feedin mL    sodium chloride 0.9%: 950 mL  Total IN: 1785 mL    OUT:    Indwelling Catheter - Urethral (mL): 1021 mL  Total OUT: 1021 mL    Total NET: 764 mL      PHYSICAL EXAM:  Constitutional: Patient is resting comfortably in stretcher, in no acute distress  ENT: PERRL, vertical EOMi  Respiratory: + trach to FVS, breathing non-labored, symmetrical chest wall movement  Cardiovascuar: RRR, no murmurs  Gastrointestinal: +PEG, abdomen soft, non tender  Neurological:  AAOX3 with eye movement, Opens eyes spontaneously, following commands  Motor: RUE shows two fingers, RLE wiggles toes, LUE 05/, LLE withdraws from noxious   Sensation: unable to assess   Pronator Drift: unable to assess   Wounds/Drains: N/A      LABS:                        9.9    10.62 )-----------( 174      ( 16 Dec 2024 05:02 )             30.9     12-16    139  |  105  |  36[H]  ----------------------------<  100[H]  3.6   |  24  |  1.29    Ca    8.7      16 Dec 2024 05:02  Phos  4.3     12-16  Mg     2.1     12-16        Urinalysis Basic - ( 16 Dec 2024 05:02 )    Color: x / Appearance: x / SG: x / pH: x  Gluc: 100 mg/dL / Ketone: x  / Bili: x / Urobili: x   Blood: x / Protein: x / Nitrite: x   Leuk Esterase: x / RBC: x / WBC x   Sq Epi: x / Non Sq Epi: x / Bacteria: x          CAPILLARY BLOOD GLUCOSE      POCT Blood Glucose.: 83 mg/dL (16 Dec 2024 11:21)  POCT Blood Glucose.: 113 mg/dL (16 Dec 2024 06:08)      Drug Levels: [] N/A  Vancomycin Level, Trough: 37.3 ug/mL (-16 @ 16:23)    CSF Analysis: [] N/A      Allergies    Allergy Status Unknown    Intolerances      MEDICATIONS:  Antibiotics:  piperacillin/tazobactam IVPB.. 3.375 Gram(s) IV Intermittent every 8 hours    Neuro:  acetaminophen   Oral Liquid .. 650 milliGRAM(s) Oral every 6 hours PRN    Anticoagulation:  heparin   Injectable 5000 Unit(s) SubCutaneous every 8 hours    OTHER:  acetylcysteine 10%  Inhalation 4 milliLiter(s) Inhalation every 6 hours  albuterol/ipratropium for Nebulization 3 milliLiter(s) Nebulizer every 6 hours  amLODIPine   Tablet 5 milliGRAM(s) Oral daily  artificial tears (preservative free) Ophthalmic Solution 1 Drop(s) Right EYE every 4 hours  chlorhexidine 0.12% Liquid 15 milliLiter(s) Oral Mucosa every 12 hours  chlorhexidine 2% Cloths 1 Application(s) Topical <User Schedule>  doxazosin 8 milliGRAM(s) Oral at bedtime  erythromycin   Ointment 1 Application(s) Right EYE at bedtime  fluticasone propionate 50 MICROgram(s)/spray Nasal Spray 1 Spray(s) Both Nostrils two times a day  ofloxacin 0.3% Solution 1 Drop(s) Right EYE four times a day  pantoprazole  Injectable 40 milliGRAM(s) IV Push daily  petrolatum Ophthalmic Ointment 1 Application(s) Both EYES two times a day  polyethylene glycol 3350 17 Gram(s) Oral every 12 hours  senna 2 Tablet(s) Oral every 12 hours  sodium chloride 0.65% Nasal 1 Spray(s) Both Nostrils two times a day    IVF:  sodium chloride 0.9%. 1000 milliLiter(s) IV Continuous <Continuous>    CULTURES:  Culture Results:   Commensal iram consistent with body site (12-15 @ 05:06)  Culture Results:   No growth at 24 hours (12-15 @ 01:03)    ASSESSMENT:  43 y/o PMH ?stroke/MI present to [facility] after collapsing at work. Decorticate posturing, vomiting, intubated for airway protection. 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 Transferred to [facility] for further management. s/p trach 10/14. s/p peg 10/21. Re-upgrade to ICU 2/2 desaturation event and suctioning requirements 11/15.     Neuro:  - neuro checks q4/vitals q2h  - pain control: tylenol prn  - vEEG (10/3-)- negative, (10/17-10/19) - negative  - CTH : enlarged pontine hemorrhage, CTH 10/3: stable, CTH 10/25: mostly resolved pontine hemorrhage, CTH 12/15: R mastoid air cell opacification; acute otitis media vs sterile effusion  - MRI brain 10/12: parenchymal hemorrhage, acute/subacute R cerebellar stroke    - stroke core measures, stroke neuro signed off    CV:  - -160  - HTN: amlodipine 5mg, hold lisinopril 5mg   - echo () EF 75%, repeat : 57%  - trend troponins     Resp:  - trach to vent, FVS 40/400/14/6, CPAP as tolerated  - CTA PE protocol 12/15 , negative   - Secretions: duonebs/mucomyst/chest PT q8h     GI:  - TF via PEG (placed 10/21 by gen surg)  - bowel regimen, last BM 12/15    Renal:  - NS @ 50  - Urinary retenion: Gabriel replaced 12/15  - CKD: trend BUN/Cr  - renal US 10/1: echogenicity c/w chronic med renal dz, repeat 10/8: inc renal echogeicity, c/f medical renal disease w increased hydro     Endo:  - A1c 5.4    Heme:  - DVT ppx: SCDs, SQH 5000u q8h   - LE dopplers negative     ID:  - vanc/zosyn started empirically 12/15  - f/u blood and sputum cultures 12/15  - last pancx , MRSA swab neg    - s/p Stenotrophomonas maltophilia PNA: s/p Bactrim (-) s/p Cefepime (-)  - 11/3, (+) sputum for stenotrophomas maltophlia, blood cx (+) klebsiella, cefepime 2gq12 ( - )   - empiric tx: s/p zosyn (11/3-) , s/p vanc  (11/3-)  - S/p Ancef (10/4-10/14) for PNA, and s/p Unasyn (10/18-10/23) +actinobacter baumanii     MISC:  - ophtho consult for keratitis  - erythromycin ointment R eye q4hrs, ofloxacin ointment R eye QID, artificial tears R eye q2hrs, moisture chamber at bedtime    Dispo: ICU status, full code, pending placement and guardianship hearing     D/w Dr. D'Amico and Dr. Tomlin

## 2024-12-17 NOTE — PROGRESS NOTE ADULT - ASSESSMENT
44y/M with  large pontine hemorrhage, SAH, brain edema; ?locked-in  CVA  h/o MI  acute on chronic CKD, hydronephrosis    PLAN:   NEURO: neurochecks VS q4 PRN pain meds   neurostim: off   palliative / ethics follow-up  off VEEG - neg  REHAB:  physical therapy evaluation and management    EARLY MOB:  bedrest HOB up    PULM:  trach collar 40%; pulmo toilette, standing ipratropium / albuterol / mucomyst; chest PT, HOB up  CARDIO:  SBP goal 100-160mm Hg,  d/c amlodipine, lisinopril; EF 75%  ENDO:  Blood sugar goals 140-180 mg/dL, A1C 5.4, ISS  GI:  bowel regimen  DIET: TF   RENAL:  IVL, Na goal 135-145, TOV cont doxazosin 8; d/c PhosLo  HEM/ONC:  Hb stable  VTE Prophylaxis: SCDs, SQH  ID: afebrile, no leukocytosis  Social: will update family    Active issues:  What's keeping patient in the ICU? stepdown this afternoon   What is this patient's dispo plan? stepdown once stable      ATTENDING ATTESTATION:  Patient not at high risk for neurologic deterioration / death.  Time spent on this noncritically ill patient: 55 minutes spent on total encounter, more than 50% of the visit was spent counseling and/or coordinating care by the attending physician.      Plan discussed with RN, house staff.    REVIEW OF SYSTEMS:  No headaches, no nausea or vomiting; 14 -point review of systems otherwise unremarkable.      ICU stepdown Checklist:    Completed: 11-26 @ 09:54    [X] hemodynamically stable – VS WNL and stable x 24hours, UO adequate  [n/a ] if  previously on HDA - off pressors x 24h with stable neuro exam    [X] no new symptoms x 24h (i.e. new fever, new-onset nausea/vomiting)  [X] stable labs: (i.e. WBC not rising, sodium not dropping)  [  ] patient not at high risk for aspiration, if high risk then:                  [ ] should have definitive plans for trach/PEG (alternative option is to discharge from ICU to facilty)                  [X] stepdown to bed close to nurse’s station  [n/a] low suctioning requirements (i.e. q4h or less)  [X] sign-off from primary RN*  Vanessa Coma   [X] drains do not require ICU level of care  [X] if patient previously agitated or with behavioral issues – controlled   [X] pain controlled       44y/M with  large pontine hemorrhage, SAH, brain edema; ?locked-in  CVA  h/o MI  acute on chronic CKD, hydronephrosis    PLAN:   NEURO: neurochecks VS q4 VSq1h, PRN pain meds   neurostim: off   palliative / ethics follow-up  off VEEG - neg  REHAB:  physical therapy evaluation and management    EARLY MOB:  bedrest HOB up    PULM:  CPAP 10/6 - wean to 8/6, pulmo toilette, standing ipratropium / albuterol / mucomyst; chest PT, HOB up  CARDIO:  SBP goal 100-160mm Hg, EF 75%  ENDO:  Blood sugar goals 140-180 mg/dL, A1C 5.4, ISS  GI:  off bowel regimen (diarrhea)  DIET: TF banatrol  RENAL:  keep IVF for now until tomorrow, Na goal 135-145, TOV cont doxazosin 8;   HEM/ONC:  Hb stable  VTE Prophylaxis: SCDs, SQH  ID: afebrile, no leukocytosis, piperacillin/tazobactam until 12/20  Social: will update family    Active issues:  What's keeping patient in the ICU? vent   What is this patient's dispo plan? stepdown once stable    ATTENDING ATTESTATION:  Patient at high risk for neurological deterioration or death due to:  ICU delirium, aspiration PNA, DVT / PE.  Critical care time:  I have personally provided 60 minutes of critical care time, excluding time spent on separate procedures.      Plan discussed with RN, house staff.

## 2024-12-17 NOTE — PROGRESS NOTE ADULT - SUBJECTIVE AND OBJECTIVE BOX
=================================  NEUROCRITICAL CARE ATTENDING NOTE  =================================    GARETT DELEON   MRN-[id number]  Summary:  44y/M  unknown past medical history (reported stroke and MI by coworkers) presented to [facility] with AMS, Pt was working at [employer] when he was found down by coworkers. EMS called and pt brought to [facility] ED. Intubated, sedated, started on cardene for SBPs in 200s. CT head showed brain stem bleed. Transferred to NSICU for further management.  (30 Sep 2024 12:55)    COURSE IN THE HOSPITAL:  Date	POD	BD	Events	Surgery  	-	-	Transferred from [facility]. A-line placed. Versed discontinued. Hydralazine, 3% saline started. Changed propofol to precedex.	-  10/1	-	-	Stability CTH done. Added labetalol, started TF. Palliative and ethics consulted. Febrile 103°F, pan cultures sent.	-  10/2	-		Desaturation to 80s, FiO2 increased. Fentanyl given, ABG, CXR ordered. Propofol started. Vanc/CTX started. MRSA negative, vanc discontinued. Na 159, starting FW 250q6.	-  10/3	-	3	Na increasing, FW increased to 300q6. Bowel regimen discontinued. vEEG started.	-  10/4	-		Albumin bolus, increased LR. 7% hypersal started for 48hrs. Nephrology consulted for CKD. SBP 170s, hydralazine given.	-  10/5	-		Hydralazine and labetalol for SBP. RT placed for diarrhea.	-  10/6	-		FW increased to 350q4. IV Tylenol for temp 100.6°F.	-  10/7	-		Pancultured for temp 101.8°F.	-  10/8	-		Cr increase, LR decreased. Simethicone added. Increased hydralazine, labetalol. Na 145, FW 250q6. Retaining fluid, bladder scans ordered.	-  10/9	-		Abd x-ray for distention. Changed tube feeds to Jevity 1.5. Tolerating CPAP.	-  10/10	-	10	Vuong for urinary retention. Increased TF to goal. Family leaning toward trach/PEG.	-  10/11	-		Trach/PEG consults placed.	-  10/12	-		MRI brain complete.	-  10/13	-	13	Increased flomax. Hold SQH for trach.	-  10/14	-		S/p trach with pulmonary. NGT placed, CXR confirmed position.	-  10/15	-	15	Resumed feeds. Spiked fever 101°F, pan cultures sent.	-  10/16	-	16	Lokelma for K+ 5.4. Started vanc/zosyn for PNA. PEG held for fever.	-  10/17	-	17	Serum and urine osm ordered. Started sinemet, FW 100q4, IVF discontinued. MRSA negative, vanc discontinued. NGT replaced.	-  10/18	-	18	Amantadine added. Changed zosyn to unasyn for acinetobacter baumannii. Failed TOV, required SC.	-  10/19	-	19	Cardura added for retention. Decreased labetalol, hydralazine. Vuong replaced.	-  10/20	-	20	NGT dislodged, replaced. PEG scheduled for tomorrow. Lokelma for hyperkalemia.  10/21	-	21	s/p PEG  10/22	-	22	No significant events overnight. 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  trach collar 40%  10/24   -	24	No significant events overnight.   10/25   -	25	No significant events overnight.   10/26   -	26	Anisocoria overnight, given 23.4% x1, CT stable; briefly back on vent, ABG unremarkable, now back on trach collar; exam - following commands!  10/27   -	27	No significant events overnight.     Date	POD	BD	Events	  10/19		19	Cardura 2mg added for retention. Labetalol decreased. Vuong replaced.	  10/20		20	NGT dislodged, replaced. PEG placement scheduled for tomorrow. Free water increased. Labetalol decreased. Lokelma given for hyperkalemia.	  10/21	0	21	PEG placement with Gen Surg. Labetalol decreased. Cardura increased. Started Lokelma and Phoslo. Vuong discontinued.	PEG placement with Gen Surg  10/22		22	Plan to start tube feeding via PEG. Labetalol discontinued. CPAP 5/ for Cheyne-Vaughn respiration.	  10/23		23	Hydralazine discontinued. Trach collar trial. Rectal tube placed.	  10/24		24	Lokelma held for diarrhea. Cardura increased. Jevity switched to Nepro. Vuong placed for high urine output.	  10/25		25	Change in neuro status: anisocoria. Emergent CTH: resolved pontine hemorrhage, continued edema.	  10/26		26	Lokelma and free water resumed. 23.4% NaCl given. Emergent CTH: resolved pontine hemorrhage, continued edema.	  10/27		27	Neuro stable.	  10/28		28	Neuro stable. Miralax ordered. Vuong removed, pending TOV.	  10/29		29	2L NS over 8 hrs for increased BUN/Cr ratio. Vuong placed.	  10/31		31	Na 149, increased free water to 200q6. 1L NS for uptrending BUN.	  		32	1L NS for dehydration. Na 146, increased FW to 250q6.	  		33	Neuro stable. 500cc bolus for tachycardia.	  11/3		34	Neuro stable. Febrile to 101.9°F, pan cultured. 500cc NS bolus for sinus tachycardia.	  		35	Neuro stable. 1L NS for tachycardia. Sputum (+) for Stenotrophomonas.	  		36	Neuro stable. Vancomycin discontinued. Chest PT BID. ID consulted. Continue Zosyn.	  		37	Blood cx (+) for Klebsiella. Zosyn changed to Cefepime. CTAP ordered.	  		38	Maintenance fluids started. Pending CT A/P.	  		39	CT CAP negative for infection.	  11/10		41	Desat to 85 on trach collar, O2 increased. Sat 94%. Fever, pancultured. AM ABG pO2 79.	  		42	Tachycardia 130s, desat to 90 on 10L. Cough, temp 101.4, given 1g IV Tylenol and 500cc NS. Fever and HR downtrending.	  		43	Fever and HR downtrending, satting 97% on 70% FIO2.	  		44	Standing Tylenol for tachycardia. Desat to 80s, O2 increased. Pending CTA PE protocol.	  		45	Neuro stable. Resp therapy decreased FiO2 to 70%.	  11/15		46	Rapid called for desat 30s, tachycardia 140s. Upgraded to ICU. Vent started. POCUS and CXR unremarkable. ABG improved.	  		47	POCUS showed collapsible IVC, given 1L bolus. Vanco/Cefepime started for PNA. MRSA swab negative, Vanco discontinued.	  		48	1L bolus for tachycardia. Fever 101, cultured. 1.5L bolus, 5 IV Lopressor. Stenotrophomonas on sputum cx.	  		49	Consulted ID, Cefepime switched to Bactrim x7 days. Started Hydrochlorothiazine 12.5mg daily.	  		50	Tachycardia 120s, given Tylenol and 500cc NS. Tolerating 5/5, switched to TCx. Bactrim discontinued. Vuong discontinued.	  		51	Satting low 90s, mildly tachycardic to 110s. O2 improved on TCx. CPAP 5/5 placed back on.	  		52	Tolerating CPAP . HCTZ held for Cr bump, straight cath increased.	  		53	Resumed Phoslo. Vuong placed. Resumed HCTZ.	  		54	Holding Tylenol for possible fever, pan culture if febrile. Cr improved. Cont. CPAP.	  		55	Neuro stable. HCTZ dc'd, started Lisinopril 5. Lokelma dc'd for K 3.7.	  		56	Neuro stable. hypotension to 60s SBP, given fluid bolus   No significant events overnight.     RTICU    Past Medical History: Acute myocardial infarction CVA (cerebral vascular accident)  Allergies:  Allergy Status Unknown  Home meds:     PHYSICAL EXAMINATION  T(C): 36.7 (24 @ 07:00), Max: 36.8 (24 @ 14:20) HR: 59 (24 @ 07:01) (59 - 81) BP: 153/103 (24 @ 07:01) (119/82 - 158/107) RR: 14 (24 @ 07:01) (13 - 20) SpO2: 100% (24 @ 07:01) (98% - 100%)  NEUROLOGIC EXAMINATION:  Patient awake, alert, OE spontaneously, tracks, FC R UE (attempts to squeeze to command), RANDELL , RUE 1/5 L UE withdraws, R LE wiggles, withdraws, L LE withdraws   GENERAL: trach collar 40%  EENT:  anicteric,  CARDIOVASCULAR: (+) S1 S2, normal rate and regular rhythm  PULMONARY: clear to auscultation bilaterally (oral secretions q2-3)  ABDOMEN: soft, nontender with normoactive bowel sounds  EXTREMITIES: no edema  SKIN: no rash    LABS:   CAPILLARY BLOOD GLUCOSE 83    (10.62)  9.4  (9.9)  6.55  )-----------( 179      ( 17 Dec 2024 05:21 )             29.2     138  |  106  |  28[H]  ----------------------------<  100[H]  3.3[L]   |  22  |  1.25 (1.29, 1.19)    Ca    8.3[L]      17 Dec 2024 05:21  Phos  3.3       Mg     2.0      @ 07:01  -   @ 07:00  --------------------------------------------------------  IN: 2220 mL / OUT: 1296 mL / NET: 924 mL     @ 07:01  -   @ 07:30  --------------------------------------------------------  IN: 50 mL / OUT: 0 mL / NET: 50 mL     Bacteriology:  10/16 sputum culture acinetobacter  10/15 Blood CS NG5D    12/15 Sputum CS commensal iram  12/15 blood CS NG2D x1    CSF studies:  EEG:  Neuroimagin2024 9:30 AM	CT Brain	Large pontine hemorrhage, SAH, edema, no hydrocephalus. Multiple old infarcts.  2024 1:11 PM	CTA Head/Neck	Pontine hemorrhage stable, no malformation, stable ventricular size, no stenosis or aneurysm.  2024 6:40 PM	CT Brain	Enlarged pontine hemorrhage, SAH in frontal lobes.  2024        	CT Brain	Stable pontine hemorrhage, SAH stable.  10/03/2024 11:06 AM	CT Brain	Stable hematoma, no rebleeding.  10/12/2024 9:16 AM	MRI Brain	Stable hemorrhage, no new bleeding, cerebellar infarct.    Other imagin2024 7:20PM	CTA CHEST	No PE to lobar arteries; aspiration in RLL with groundglass opacity.  2024 7:50PM	US DPLX LE	No DVT in either lower extremity.  2024 6:47AM	CT ABD/PELVIS	Atelectasis; can't exclude pneumonia; no abdominal/pelvic infection.  10/25/2024 12:56AM	CT BRAIN	Evolving pontine hemorrhage, no rebleeding, worsened R mastoid opacification.  10/19/2024 1:13PM	US DPLX LE	No DVT in either lower extremity.  10/01/2024 11:49 AM	US Retroperit	No hydronephrosis; chronic renal disease; Vuong catheter.  10/01/2024 11:14 AM	XR Abdomen	Orogastric tube coiled in stomach; nonspecific bowel gas pattern.  10/07/2024 10:25 PM	XR Abdomen	Nonspecific bowel gas pattern.  10/08/2024 8:01 AM	US Retroperit	Normal kidneys; distended bladder; mild hydronephrosis.  10/10/2024 7:32 AM	XR Abdomen	Nasogastric tube; nonspecific bowel gas pattern.  10/19/2024 1:13 PM	US DPLX Veins	No DVT in lower extremities.    MEDICATIONS: 12-17    ·	heparin   Injectable 5000 SubCutaneous every 8 hours  ·	piperacillin/tazobactam IVPB.. 3.375 IV Intermittent every 8 hours  ·	amLODIPine   Tablet 5 milliGRAM(s) Oral daily  ·	doxazosin 8 milliGRAM(s) Oral at bedtime  ·	acetylcysteine 10%  Inhalation 4 Inhalation every 6 hours  ·	albuterol/ipratropium for Nebulization 3 Nebulizer every 6 hours  ·	pantoprazole  Injectable 40 IV Push daily  ·	polyethylene glycol 3350 17 Oral every 12 hours  ·	senna 2 Oral every 12 hours  ·	artificial tears (preservative free) Ophthalmic Solution 1 Right EYE every 4 hours  ·	erythromycin   Ointment 1 Right EYE at bedtime  ·	fluticasone propionate 50 MICROgram(s)/spray Nasal Spray 1 Both Nostrils two times a day  ·	ofloxacin 0.3% Solution 1 Right EYE four times a day  ·	petrolatum Ophthalmic Ointment 1 Both EYES two times a day  ·	potassium chloride   Powder 40 Enteral Tube every 2 hours  ·	sodium chloride 0.65% Nasal 1 Both Nostrils two times a day  ·	acetaminophen   Oral Liquid .. 650 Oral every 6 hours PRN     IV FLUIDS: IVL  DRIPS:  DIET: Maribeth Sauceda at goal  Lines:   Drains:     Wounds:    CODE STATUS:  Full Code                       GOALS OF CARE:  aggressive                      DISPOSITION:  ICU  ICH Score 3 =================================  NEUROCRITICAL CARE ATTENDING NOTE  =================================    GARETT DELEON   MRN-[id number]  Summary:  44y/M  unknown past medical history (reported stroke and MI by coworkers) presented to [facility] with AMS, Pt was working at [employer] when he was found down by coworkers. EMS called and pt brought to [facility] ED. Intubated, sedated, started on cardene for SBPs in 200s. CT head showed brain stem bleed. Transferred to NSICU for further management.  (30 Sep 2024 12:55)    COURSE IN THE HOSPITAL:  Date	POD	BD	Events	Surgery  	-	-	Transferred from [facility]. A-line placed. Versed discontinued. Hydralazine, 3% saline started. Changed propofol to precedex.	-  10/1	-	-	Stability CTH done. Added labetalol, started TF. Palliative and ethics consulted. Febrile 103°F, pan cultures sent.	-  10/2	-		Desaturation to 80s, FiO2 increased. Fentanyl given, ABG, CXR ordered. Propofol started. Vanc/CTX started. MRSA negative, vanc discontinued. Na 159, starting FW 250q6.	-  10/3	-	3	Na increasing, FW increased to 300q6. Bowel regimen discontinued. vEEG started.	-  10/4	-		Albumin bolus, increased LR. 7% hypersal started for 48hrs. Nephrology consulted for CKD. SBP 170s, hydralazine given.	-  10/5	-		Hydralazine and labetalol for SBP. RT placed for diarrhea.	-  10/6	-		FW increased to 350q4. IV Tylenol for temp 100.6°F.	-  10/7	-		Pancultured for temp 101.8°F.	-  10/8	-		Cr increase, LR decreased. Simethicone added. Increased hydralazine, labetalol. Na 145, FW 250q6. Retaining fluid, bladder scans ordered.	-  10/9	-		Abd x-ray for distention. Changed tube feeds to Jevity 1.5. Tolerating CPAP.	-  10/10	-	10	Vuong for urinary retention. Increased TF to goal. Family leaning toward trach/PEG.	-  10/11	-		Trach/PEG consults placed.	-  10/12	-		MRI brain complete.	-  10/13	-	13	Increased flomax. Hold SQH for trach.	-  10/14	-		S/p trach with pulmonary. NGT placed, CXR confirmed position.	-  10/15	-	15	Resumed feeds. Spiked fever 101°F, pan cultures sent.	-  10/16	-	16	Lokelma for K+ 5.4. Started vanc/zosyn for PNA. PEG held for fever.	-  10/17	-	17	Serum and urine osm ordered. Started sinemet, FW 100q4, IVF discontinued. MRSA negative, vanc discontinued. NGT replaced.	-  10/18	-	18	Amantadine added. Changed zosyn to unasyn for acinetobacter baumannii. Failed TOV, required SC.	-  10/19	-	19	Cardura added for retention. Decreased labetalol, hydralazine. Vuong replaced.	-  10/20	-	20	NGT dislodged, replaced. PEG scheduled for tomorrow. Lokelma for hyperkalemia.  10/21	-	21	s/p PEG  10/22	-	22	No significant events overnight. 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  trach collar 40%  10/24   -	24	No significant events overnight.   10/25   -	25	No significant events overnight.   10/26   -	26	Anisocoria overnight, given 23.4% x1, CT stable; briefly back on vent, ABG unremarkable, now back on trach collar; exam - following commands!  10/27   -	27	No significant events overnight.     Date	POD	BD	Events	  10/19		19	Cardura 2mg added for retention. Labetalol decreased. Vuong replaced.	  10/20		20	NGT dislodged, replaced. PEG placement scheduled for tomorrow. Free water increased. Labetalol decreased. Lokelma given for hyperkalemia.	  10/21	0	21	PEG placement with Gen Surg. Labetalol decreased. Cardura increased. Started Lokelma and Phoslo. Vuong discontinued.	PEG placement with Gen Surg  10/22		22	Plan to start tube feeding via PEG. Labetalol discontinued. CPAP 5/ for Cheyne-Vaughn respiration.	  10/23		23	Hydralazine discontinued. Trach collar trial. Rectal tube placed.	  10/24		24	Lokelma held for diarrhea. Cardura increased. Jevity switched to Nepro. Vuong placed for high urine output.	  10/25		25	Change in neuro status: anisocoria. Emergent CTH: resolved pontine hemorrhage, continued edema.	  10/26		26	Lokelma and free water resumed. 23.4% NaCl given. Emergent CTH: resolved pontine hemorrhage, continued edema.	  10/27		27	Neuro stable.	  10/28		28	Neuro stable. Miralax ordered. Vuong removed, pending TOV.	  10/29		29	2L NS over 8 hrs for increased BUN/Cr ratio. Vuong placed.	  10/31		31	Na 149, increased free water to 200q6. 1L NS for uptrending BUN.	  		32	1L NS for dehydration. Na 146, increased FW to 250q6.	  		33	Neuro stable. 500cc bolus for tachycardia.	  11/3		34	Neuro stable. Febrile to 101.9°F, pan cultured. 500cc NS bolus for sinus tachycardia.	  		35	Neuro stable. 1L NS for tachycardia. Sputum (+) for Stenotrophomonas.	  		36	Neuro stable. Vancomycin discontinued. Chest PT BID. ID consulted. Continue Zosyn.	  		37	Blood cx (+) for Klebsiella. Zosyn changed to Cefepime. CTAP ordered.	  		38	Maintenance fluids started. Pending CT A/P.	  		39	CT CAP negative for infection.	  11/10		41	Desat to 85 on trach collar, O2 increased. Sat 94%. Fever, pancultured. AM ABG pO2 79.	  		42	Tachycardia 130s, desat to 90 on 10L. Cough, temp 101.4, given 1g IV Tylenol and 500cc NS. Fever and HR downtrending.	  		43	Fever and HR downtrending, satting 97% on 70% FIO2.	  		44	Standing Tylenol for tachycardia. Desat to 80s, O2 increased. Pending CTA PE protocol.	  		45	Neuro stable. Resp therapy decreased FiO2 to 70%.	  11/15		46	Rapid called for desat 30s, tachycardia 140s. Upgraded to ICU. Vent started. POCUS and CXR unremarkable. ABG improved.	  		47	POCUS showed collapsible IVC, given 1L bolus. Vanco/Cefepime started for PNA. MRSA swab negative, Vanco discontinued.	  		48	1L bolus for tachycardia. Fever 101, cultured. 1.5L bolus, 5 IV Lopressor. Stenotrophomonas on sputum cx.	  		49	Consulted ID, Cefepime switched to Bactrim x7 days. Started Hydrochlorothiazine 12.5mg daily.	  		50	Tachycardia 120s, given Tylenol and 500cc NS. Tolerating 5/5, switched to TCx. Bactrim discontinued. Vuong discontinued.	  		51	Satting low 90s, mildly tachycardic to 110s. O2 improved on TCx. CPAP 5/5 placed back on.	  		52	Tolerating CPAP 5/5. HCTZ held for Cr bump, straight cath increased.	  		53	Resumed Phoslo. Vuong placed. Resumed HCTZ.	  		54	Holding Tylenol for possible fever, pan culture if febrile. Cr improved. Cont. CPAP.	  		55	Neuro stable. HCTZ dc'd, started Lisinopril 5. Lokelma dc'd for K 3.7.	  		56	Neuro stable. hypotension to 60s SBP, given fluid bolus  : BD 57, GEORGE overnight. Neuro stable. Dc'd phoslo.   : BD 58, GEORGE overnight. Neuro stable.    : BD 59. Vuong replaced.   : GEORGE.  : GEORGE, neuro stable.   : BD 62, GEORGE overnight  : BD 63, GEORGE overnight.; Given 1L bolus of LR for uptrending BUN/Cr.  12/3: BD 64. Reinstated eye gtt/moisture chamber given increased Rt eye injection  : BD 65. GEORGE overnight. Attempted to speak with ophtho regarding eyelid weight/closure but no answer, full mailbox.   : BD 66, GEORGE overnight, bowel regimen increased and had BM.   : BD 67, GEORGE overnight, neuro stable.  : GEORGE overnight, neuro stable. /110s, given x1 hydralazine 10 mg IVP. Restarted home amlodipine 5mg.  : GEORGE. OOB to chair.     : GEORGE, mucomyst added for thick secretions, simethicone for abd distension, abd xray with stool burden, increased bowel regimen.   : GEORGE overnight.   : GEORGE overnight.   : GEORGE overnight  12/15: o/n Patient became tachycardic HR 120s and 10 minutes later O2 sat dropped as low as 89%. Patient suctioned without improvement in O2 sat and tube feeds found in suction catheter. TFs held.  STAT CXR ordered. STAT labs sent. Respiratory therapist called to bedside and patient trach connected to ventilator. After connection to ventilator and further suctioning O2 sat improved to 97% but patient HR remains 120-130s. Upgraded to NSICU for further management. Vancomycin and zosyn started. CTA  chest PE protocol and CTH ordered. Blood cultures sent.given 500cc bolus, rpt ABG sent pO2 243, CTH and CTA chest done. FS while NPO, FiO2 dec 50 pending ABG. sputum cx positive for few GPC and GNR.   : GEOGRE overnight, restarted amlodipine, troponin 75   : GEORGE overnight, neuro stable.     Past Medical History: Acute myocardial infarction CVA (cerebral vascular accident)  Allergies:  Allergy Status Unknown  Home meds:     PHYSICAL EXAMINATION  T(C): 36.7 (24 @ 07:00), Max: 36.8 (24 @ 14:20) HR: 59 (24 @ 07:01) (59 - 81) BP: 153/103 (24 @ 07:01) (119/82 - 158/107) RR: 14 (24 @ 07:01) (13 - 20) SpO2: 100% (24 @ 07:01) (98% - 100%)  NEUROLOGIC EXAMINATION:  Patient awake, alert, Oriented x3 with choices, tracks, RANDELL, R UE hand  2/5 RLE wiggles L UE trace withdrawal L LE withdrwas  GENERAL: full vent support 40% 400 14 +6 -- CPAP 10/6 40%  EENT:  anicteric,  CARDIOVASCULAR: (+) S1 S2, normal rate and regular rhythm  PULMONARY: clear to auscultation bilaterally (oral thick secretions)  ABDOMEN: soft, nontender with normoactive bowel sounds  EXTREMITIES: no edema  SKIN: no rash    LABS:   CAPILLARY BLOOD GLUCOSE 83    (10.62)  9.4  (9.9)  6.55  )-----------( 179      ( 17 Dec 2024 05:21 )             29.2     138  |  106  |  28[H]  ----------------------------<  100[H]  3.3[L]   |  22  |  1.25 (1.29, 1.19)    Ca    8.3[L]      17 Dec 2024 05:21  Phos  3.3       Mg     2.0      @ 07:01   @ 07:00  --------------------------------------------------------  IN: 2220 mL / OUT: 1296 mL / NET: 924 mL     @ 07:01  -   @ 07:30  --------------------------------------------------------  IN: 50 mL / OUT: 0 mL / NET: 50 mL     Bacteriology:  10/16 sputum culture acinetobacter  10/15 Blood CS NG5D    12/15 Sputum CS commensal iram  12/15 blood CS NG2D x1    CSF studies:  EEG:  Neuroimagin2024 9:30 AM	CT Brain	Large pontine hemorrhage, SAH, edema, no hydrocephalus. Multiple old infarcts.  2024 1:11 PM	CTA Head/Neck	Pontine hemorrhage stable, no malformation, stable ventricular size, no stenosis or aneurysm.  2024 6:40 PM	CT Brain	Enlarged pontine hemorrhage, SAH in frontal lobes.  2024        	CT Brain	Stable pontine hemorrhage, SAH stable.  10/03/2024 11:06 AM	CT Brain	Stable hematoma, no rebleeding.  10/12/2024 9:16 AM	MRI Brain	Stable hemorrhage, no new bleeding, cerebellar infarct.    Other imagin2024 7:20PM	CTA CHEST	No PE to lobar arteries; aspiration in RLL with groundglass opacity.  2024 7:50PM	US DPLX LE	No DVT in either lower extremity.  2024 6:47AM	CT ABD/PELVIS	Atelectasis; can't exclude pneumonia; no abdominal/pelvic infection.  10/25/2024 12:56AM	CT BRAIN	Evolving pontine hemorrhage, no rebleeding, worsened R mastoid opacification.  10/19/2024 1:13PM	US DPLX LE	No DVT in either lower extremity.  10/01/2024 11:49 AM	US Retroperit	No hydronephrosis; chronic renal disease; Vuong catheter.  10/01/2024 11:14 AM	XR Abdomen	Orogastric tube coiled in stomach; nonspecific bowel gas pattern.  10/07/2024 10:25 PM	XR Abdomen	Nonspecific bowel gas pattern.  10/08/2024 8:01 AM	US Retroperit	Normal kidneys; distended bladder; mild hydronephrosis.  10/10/2024 7:32 AM	XR Abdomen	Nasogastric tube; nonspecific bowel gas pattern.  10/19/2024 1:13 PM	US DPLX Veins	No DVT in lower extremities.    MEDICATIONS: 12-17    ·	heparin   Injectable 5000 SubCutaneous every 8 hours  ·	piperacillin/tazobactam IVPB.. 3.375 IV Intermittent every 8 hours  ·	amLODIPine   Tablet 5 milliGRAM(s) Oral daily  ·	doxazosin 8 milliGRAM(s) Oral at bedtime  ·	acetylcysteine 10%  Inhalation 4 Inhalation every 6 hours  ·	albuterol/ipratropium for Nebulization 3 Nebulizer every 6 hours  ·	pantoprazole  Injectable 40 IV Push daily  ·	polyethylene glycol 3350 17 Oral every 12 hours  ·	senna 2 Oral every 12 hours  ·	artificial tears (preservative free) Ophthalmic Solution 1 Right EYE every 4 hours  ·	erythromycin   Ointment 1 Right EYE at bedtime  ·	fluticasone propionate 50 MICROgram(s)/spray Nasal Spray 1 Both Nostrils two times a day  ·	ofloxacin 0.3% Solution 1 Right EYE four times a day  ·	petrolatum Ophthalmic Ointment 1 Both EYES two times a day  ·	sodium chloride 0.65% Nasal 1 Both Nostrils two times a day  ·	acetaminophen   Oral Liquid .. 650 Oral every 6 hours PRN     IV FLUIDS: NS@50cc/hr  DRIPS:  DIET: Maribeth Sauceda at goal  Lines:   Drains:    Vuong (changed 12/15)  Wounds:    CODE STATUS:  Full Code                       GOALS OF CARE:  aggressive                      DISPOSITION:  ICU  ICH Score 3

## 2024-12-18 LAB
ANION GAP SERPL CALC-SCNC: 9 MMOL/L — SIGNIFICANT CHANGE UP (ref 5–17)
BUN SERPL-MCNC: 20 MG/DL — SIGNIFICANT CHANGE UP (ref 7–23)
CALCIUM SERPL-MCNC: 8.3 MG/DL — LOW (ref 8.4–10.5)
CHLORIDE SERPL-SCNC: 109 MMOL/L — HIGH (ref 96–108)
CO2 SERPL-SCNC: 22 MMOL/L — SIGNIFICANT CHANGE UP (ref 22–31)
CREAT SERPL-MCNC: 1.06 MG/DL — SIGNIFICANT CHANGE UP (ref 0.5–1.3)
EGFR: 88 ML/MIN/1.73M2 — SIGNIFICANT CHANGE UP
EGFR: 88 ML/MIN/1.73M2 — SIGNIFICANT CHANGE UP
GLUCOSE SERPL-MCNC: 89 MG/DL — SIGNIFICANT CHANGE UP (ref 70–99)
HCT VFR BLD CALC: 27.5 % — LOW (ref 39–50)
HGB BLD-MCNC: 8.6 G/DL — LOW (ref 13–17)
MAGNESIUM SERPL-MCNC: 2.1 MG/DL — SIGNIFICANT CHANGE UP (ref 1.6–2.6)
MCHC RBC-ENTMCNC: 29.4 PG — SIGNIFICANT CHANGE UP (ref 27–34)
MCHC RBC-ENTMCNC: 31.3 G/DL — LOW (ref 32–36)
MCV RBC AUTO: 93.9 FL — SIGNIFICANT CHANGE UP (ref 80–100)
NRBC # BLD: 0 /100 WBCS — SIGNIFICANT CHANGE UP (ref 0–0)
NRBC BLD-RTO: 0 /100 WBCS — SIGNIFICANT CHANGE UP (ref 0–0)
PHOSPHATE SERPL-MCNC: 4.2 MG/DL — SIGNIFICANT CHANGE UP (ref 2.5–4.5)
PLATELET # BLD AUTO: 185 K/UL — SIGNIFICANT CHANGE UP (ref 150–400)
POTASSIUM SERPL-MCNC: 3.9 MMOL/L — SIGNIFICANT CHANGE UP (ref 3.5–5.3)
POTASSIUM SERPL-SCNC: 3.9 MMOL/L — SIGNIFICANT CHANGE UP (ref 3.5–5.3)
RBC # BLD: 2.93 M/UL — LOW (ref 4.2–5.8)
RBC # FLD: 14.9 % — HIGH (ref 10.3–14.5)
SODIUM SERPL-SCNC: 140 MMOL/L — SIGNIFICANT CHANGE UP (ref 135–145)
VANCOMYCIN FLD-MCNC: 15.1 UG/ML — SIGNIFICANT CHANGE UP
WBC # BLD: 5.96 K/UL — SIGNIFICANT CHANGE UP (ref 3.8–10.5)
WBC # FLD AUTO: 5.96 K/UL — SIGNIFICANT CHANGE UP (ref 3.8–10.5)

## 2024-12-18 PROCEDURE — 99291 CRITICAL CARE FIRST HOUR: CPT

## 2024-12-18 PROCEDURE — 70450 CT HEAD/BRAIN W/O DYE: CPT | Mod: 26

## 2024-12-18 PROCEDURE — 95720 EEG PHY/QHP EA INCR W/VEEG: CPT

## 2024-12-18 RX ORDER — LEVETIRACETAM 10 MG/ML
1000 INJECTION, SOLUTION INTRAVENOUS EVERY 12 HOURS
Refills: 0 | Status: DISCONTINUED | OUTPATIENT
Start: 2024-12-18 | End: 2024-12-22

## 2024-12-18 RX ORDER — LEVETIRACETAM 10 MG/ML
1000 INJECTION, SOLUTION INTRAVENOUS EVERY 12 HOURS
Refills: 0 | Status: DISCONTINUED | OUTPATIENT
Start: 2024-12-18 | End: 2024-12-18

## 2024-12-18 RX ADMIN — Medication 40 MILLIGRAM(S): at 12:42

## 2024-12-18 RX ADMIN — ACETYLCYSTEINE 4 MILLILITER(S): 200 INHALANT RESPIRATORY (INHALATION) at 11:30

## 2024-12-18 RX ADMIN — Medication 1 DROP(S): at 20:32

## 2024-12-18 RX ADMIN — Medication 15 MILLILITER(S): at 06:01

## 2024-12-18 RX ADMIN — HEPARIN SODIUM 5000 UNIT(S): 1000 INJECTION INTRAVENOUS; SUBCUTANEOUS at 21:30

## 2024-12-18 RX ADMIN — Medication 1 SPRAY(S): at 05:50

## 2024-12-18 RX ADMIN — IPRATROPIUM BROMIDE AND ALBUTEROL SULFATE 3 MILLILITER(S): .5; 2.5 SOLUTION RESPIRATORY (INHALATION) at 11:30

## 2024-12-18 RX ADMIN — LEVETIRACETAM 1000 MILLIGRAM(S): 10 INJECTION, SOLUTION INTRAVENOUS at 12:42

## 2024-12-18 RX ADMIN — ACETYLCYSTEINE 4 MILLILITER(S): 200 INHALANT RESPIRATORY (INHALATION) at 05:29

## 2024-12-18 RX ADMIN — IPRATROPIUM BROMIDE AND ALBUTEROL SULFATE 3 MILLILITER(S): .5; 2.5 SOLUTION RESPIRATORY (INHALATION) at 05:30

## 2024-12-18 RX ADMIN — Medication 20 MILLIEQUIVALENT(S): at 07:49

## 2024-12-18 RX ADMIN — IPRATROPIUM BROMIDE AND ALBUTEROL SULFATE 3 MILLILITER(S): .5; 2.5 SOLUTION RESPIRATORY (INHALATION) at 22:48

## 2024-12-18 RX ADMIN — HEPARIN SODIUM 5000 UNIT(S): 1000 INJECTION INTRAVENOUS; SUBCUTANEOUS at 05:59

## 2024-12-18 RX ADMIN — Medication 1 APPLICATION(S): at 06:01

## 2024-12-18 RX ADMIN — FLUTICASONE PROPIONATE 1 SPRAY(S): 50 SPRAY, METERED NASAL at 06:00

## 2024-12-18 RX ADMIN — Medication 15 MILLILITER(S): at 18:34

## 2024-12-18 RX ADMIN — Medication 1 APPLICATION(S): at 18:32

## 2024-12-18 RX ADMIN — Medication 1 DROP(S): at 16:15

## 2024-12-18 RX ADMIN — IPRATROPIUM BROMIDE AND ALBUTEROL SULFATE 3 MILLILITER(S): .5; 2.5 SOLUTION RESPIRATORY (INHALATION) at 17:26

## 2024-12-18 RX ADMIN — Medication 25 GRAM(S): at 21:31

## 2024-12-18 RX ADMIN — Medication 25 GRAM(S): at 14:39

## 2024-12-18 RX ADMIN — Medication 1 APPLICATION(S): at 05:58

## 2024-12-18 RX ADMIN — OFLOXACIN 1 DROP(S): 3 SOLUTION OPHTHALMIC at 00:50

## 2024-12-18 RX ADMIN — OFLOXACIN 1 DROP(S): 3 SOLUTION OPHTHALMIC at 05:57

## 2024-12-18 RX ADMIN — OFLOXACIN 1 DROP(S): 3 SOLUTION OPHTHALMIC at 18:34

## 2024-12-18 RX ADMIN — Medication 1 DROP(S): at 00:51

## 2024-12-18 RX ADMIN — DOXAZOSIN MESYLATE 8 MILLIGRAM(S): 8 TABLET ORAL at 21:31

## 2024-12-18 RX ADMIN — Medication 1 SPRAY(S): at 18:33

## 2024-12-18 RX ADMIN — AMLODIPINE BESYLATE 5 MILLIGRAM(S): 10 TABLET ORAL at 06:02

## 2024-12-18 RX ADMIN — Medication 10 MILLIGRAM(S): at 18:34

## 2024-12-18 RX ADMIN — OFLOXACIN 1 DROP(S): 3 SOLUTION OPHTHALMIC at 12:43

## 2024-12-18 RX ADMIN — Medication 1 DROP(S): at 09:09

## 2024-12-18 RX ADMIN — Medication 1 DROP(S): at 12:43

## 2024-12-18 RX ADMIN — ACETYLCYSTEINE 4 MILLILITER(S): 200 INHALANT RESPIRATORY (INHALATION) at 17:26

## 2024-12-18 RX ADMIN — ACETYLCYSTEINE 4 MILLILITER(S): 200 INHALANT RESPIRATORY (INHALATION) at 22:49

## 2024-12-18 RX ADMIN — HEPARIN SODIUM 5000 UNIT(S): 1000 INJECTION INTRAVENOUS; SUBCUTANEOUS at 14:39

## 2024-12-18 RX ADMIN — ERYTHROMYCIN 1 APPLICATION(S): 5 OINTMENT OPHTHALMIC at 21:31

## 2024-12-18 RX ADMIN — Medication 25 GRAM(S): at 05:51

## 2024-12-18 RX ADMIN — FLUTICASONE PROPIONATE 1 SPRAY(S): 50 SPRAY, METERED NASAL at 18:34

## 2024-12-18 NOTE — PROGRESS NOTE ADULT - SUBJECTIVE AND OBJECTIVE BOX
=================================  NEUROCRITICAL CARE ATTENDING NOTE  =================================    GARETT DELEON   MRN-[id number]  Summary:  44y/M  unknown past medical history (reported stroke and MI by coworkers) presented to [facility] with AMS, Pt was working at [employer] when he was found down by coworkers. EMS called and pt brought to [facility] ED. Intubated, sedated, started on cardene for SBPs in 200s. CT head showed brain stem bleed. Transferred to NSICU for further management.  (30 Sep 2024 12:55)    COURSE IN THE HOSPITAL:  Date	POD	BD	Events	Surgery  	-	-	Transferred from [facility]. A-line placed. Versed discontinued. Hydralazine, 3% saline started. Changed propofol to precedex.	-  10/1	-	-	Stability CTH done. Added labetalol, started TF. Palliative and ethics consulted. Febrile 103°F, pan cultures sent.	-  10/2	-		Desaturation to 80s, FiO2 increased. Fentanyl given, ABG, CXR ordered. Propofol started. Vanc/CTX started. MRSA negative, vanc discontinued. Na 159, starting FW 250q6.	-  10/3	-	3	Na increasing, FW increased to 300q6. Bowel regimen discontinued. vEEG started.	-  10/4	-		Albumin bolus, increased LR. 7% hypersal started for 48hrs. Nephrology consulted for CKD. SBP 170s, hydralazine given.	-  10/5	-		Hydralazine and labetalol for SBP. RT placed for diarrhea.	-  10/6	-		FW increased to 350q4. IV Tylenol for temp 100.6°F.	-  10/7	-		Pancultured for temp 101.8°F.	-  10/8	-		Cr increase, LR decreased. Simethicone added. Increased hydralazine, labetalol. Na 145, FW 250q6. Retaining fluid, bladder scans ordered.	-  10/9	-		Abd x-ray for distention. Changed tube feeds to Jevity 1.5. Tolerating CPAP.	-  10/10	-	10	Vuong for urinary retention. Increased TF to goal. Family leaning toward trach/PEG.	-  10/11	-		Trach/PEG consults placed.	-  10/12	-		MRI brain complete.	-  10/13	-	13	Increased flomax. Hold SQH for trach.	-  10/14	-		S/p trach with pulmonary. NGT placed, CXR confirmed position.	-  10/15	-	15	Resumed feeds. Spiked fever 101°F, pan cultures sent.	-  10/16	-	16	Lokelma for K+ 5.4. Started vanc/zosyn for PNA. PEG held for fever.	-  10/17	-	17	Serum and urine osm ordered. Started sinemet, FW 100q4, IVF discontinued. MRSA negative, vanc discontinued. NGT replaced.	-  10/18	-	18	Amantadine added. Changed zosyn to unasyn for acinetobacter baumannii. Failed TOV, required SC.	-  10/19	-	19	Cardura added for retention. Decreased labetalol, hydralazine. Vuong replaced.	-  10/20	-	20	NGT dislodged, replaced. PEG scheduled for tomorrow. Lokelma for hyperkalemia.  10/21	-	21	s/p PEG  10/22	-	22	No significant events overnight. 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  trach collar 40%  10/24   -	24	No significant events overnight.   10/25   -	25	No significant events overnight.   10/26   -	26	Anisocoria overnight, given 23.4% x1, CT stable; briefly back on vent, ABG unremarkable, now back on trach collar; exam - following commands!  10/27   -	27	No significant events overnight.     Date	POD	BD	Events	  10/19		19	Cardura 2mg added for retention. Labetalol decreased. Vuong replaced.	  10/20		20	NGT dislodged, replaced. PEG placement scheduled for tomorrow. Free water increased. Labetalol decreased. Lokelma given for hyperkalemia.	  10/21	0	21	PEG placement with Gen Surg. Labetalol decreased. Cardura increased. Started Lokelma and Phoslo. Vuong discontinued.	PEG placement with Gen Surg  10/22		22	Plan to start tube feeding via PEG. Labetalol discontinued. CPAP 5/ for Cheyne-Vaughn respiration.	  10/23		23	Hydralazine discontinued. Trach collar trial. Rectal tube placed.	  10/24		24	Lokelma held for diarrhea. Cardura increased. Jevity switched to Nepro. Vuong placed for high urine output.	  10/25		25	Change in neuro status: anisocoria. Emergent CTH: resolved pontine hemorrhage, continued edema.	  10/26		26	Lokelma and free water resumed. 23.4% NaCl given. Emergent CTH: resolved pontine hemorrhage, continued edema.	  10/27		27	Neuro stable.	  10/28		28	Neuro stable. Miralax ordered. Vuong removed, pending TOV.	  10/29		29	2L NS over 8 hrs for increased BUN/Cr ratio. Vuong placed.	  10/31		31	Na 149, increased free water to 200q6. 1L NS for uptrending BUN.	  		32	1L NS for dehydration. Na 146, increased FW to 250q6.	  		33	Neuro stable. 500cc bolus for tachycardia.	  11/3		34	Neuro stable. Febrile to 101.9°F, pan cultured. 500cc NS bolus for sinus tachycardia.	  		35	Neuro stable. 1L NS for tachycardia. Sputum (+) for Stenotrophomonas.	  		36	Neuro stable. Vancomycin discontinued. Chest PT BID. ID consulted. Continue Zosyn.	  		37	Blood cx (+) for Klebsiella. Zosyn changed to Cefepime. CTAP ordered.	  		38	Maintenance fluids started. Pending CT A/P.	  		39	CT CAP negative for infection.	  11/10		41	Desat to 85 on trach collar, O2 increased. Sat 94%. Fever, pancultured. AM ABG pO2 79.	  		42	Tachycardia 130s, desat to 90 on 10L. Cough, temp 101.4, given 1g IV Tylenol and 500cc NS. Fever and HR downtrending.	  		43	Fever and HR downtrending, satting 97% on 70% FIO2.	  		44	Standing Tylenol for tachycardia. Desat to 80s, O2 increased. Pending CTA PE protocol.	  		45	Neuro stable. Resp therapy decreased FiO2 to 70%.	  11/15		46	Rapid called for desat 30s, tachycardia 140s. Upgraded to ICU. Vent started. POCUS and CXR unremarkable. ABG improved.	  		47	POCUS showed collapsible IVC, given 1L bolus. Vanco/Cefepime started for PNA. MRSA swab negative, Vanco discontinued.	  		48	1L bolus for tachycardia. Fever 101, cultured. 1.5L bolus, 5 IV Lopressor. Stenotrophomonas on sputum cx.	  		49	Consulted ID, Cefepime switched to Bactrim x7 days. Started Hydrochlorothiazine 12.5mg daily.	  		50	Tachycardia 120s, given Tylenol and 500cc NS. Tolerating 5/5, switched to TCx. Bactrim discontinued. Vuong discontinued.	  		51	Satting low 90s, mildly tachycardic to 110s. O2 improved on TCx. CPAP 5/5 placed back on.	  		52	Tolerating CPAP 5/5. HCTZ held for Cr bump, straight cath increased.	  		53	Resumed Phoslo. Vuong placed. Resumed HCTZ.	  		54	Holding Tylenol for possible fever, pan culture if febrile. Cr improved. Cont. CPAP.	  		55	Neuro stable. HCTZ dc'd, started Lisinopril 5. Lokelma dc'd for K 3.7.	  		56	Neuro stable. hypotension to 60s SBP, given fluid bolus  : BD 57, GEORGE overnight. Neuro stable. Dc'd phoslo.   : BD 58, GEORGE overnight. Neuro stable.    : BD 59. Vuong replaced.   : GEORGE.  : GEORGE, neuro stable.   : BD 62, GEORGE overnight  : BD 63, GEORGE overnight.; Given 1L bolus of LR for uptrending BUN/Cr.  12/3: BD 64. Reinstated eye gtt/moisture chamber given increased Rt eye injection  : BD 65. GEORGE overnight. Attempted to speak with ophtho regarding eyelid weight/closure but no answer, full mailbox.   : BD 66, GEORGE overnight, bowel regimen increased and had BM.   : BD 67, GEORGE overnight, neuro stable.  : GEORGE overnight, neuro stable. /110s, given x1 hydralazine 10 mg IVP. Restarted home amlodipine 5mg.  : GEORGE. OOB to chair.     : GEORGE, mucomyst added for thick secretions, simethicone for abd distension, abd xray with stool burden, increased bowel regimen.   : GEORGE overnight.   : GEORGE overnight.   : GEORGE overnight  12/15: o/n Patient became tachycardic HR 120s and 10 minutes later O2 sat dropped as low as 89%. Patient suctioned without improvement in O2 sat and tube feeds found in suction catheter. TFs held.  STAT CXR ordered. STAT labs sent. Respiratory therapist called to bedside and patient trach connected to ventilator. After connection to ventilator and further suctioning O2 sat improved to 97% but patient HR remains 120-130s. Upgraded to NSICU for further management. Vancomycin and zosyn started. CTA  chest PE protocol and CTH ordered. Blood cultures sent.given 500cc bolus, rpt ABG sent pO2 243, CTH and CTA chest done. FS while NPO, FiO2 dec 50 pending ABG. sputum cx positive for few GPC and GNR.   : GEORGE overnight, restarted amlodipine, troponin 75   : GEORGE overnight, neuro stable, CPAP x2 hours 10/6, apneic back to full vent; OOB to chair, sudden tachycardia 140's, L facial twitching, still following commands, given 2G levetiracetam, ativan 2mg x2, fluid bolus, metoprolol, EEG    full vent support overnight    Past Medical History: Acute myocardial infarction CVA (cerebral vascular accident)  Allergies:  Allergy Status Unknown  Home meds:     PHYSICAL EXAMINATION  T(C): 36.6 (24 @ 05:17), Max: 36.6 (24 @ 05:17) HR: 89 (24 @ 06:00) (72 - 137) BP: 139/92 (24 @ 06:00) (111/78 - 159/107) RR: 16 (24 @ 06:00) (9 - 20) SpO2: 98% (24 @ 06:00) (95% - 100%)  NEUROLOGIC EXAMINATION:  Patient awake, alert, Oriented x3 with choices, tracks, RANDELL, R UE hand  2/5 RLE wiggles L UE trace withdrawal L LE withdrwas  GENERAL: full vent support 40% 400 14 +6 -   EENT:  anicteric,  CARDIOVASCULAR: (+) S1 S2, normal rate and regular rhythm  PULMONARY: clear to auscultation bilaterally (oral thick secretions)  ABDOMEN: soft, nontender with normoactive bowel sounds  EXTREMITIES: no edema  SKIN: no rash    LABS:   CAPILLARY BLOOD GLUCOSE 95 95 129 157     (6.67)  8.6  (8.9)  5.96  )-----------( 185      ( 18 Dec 2024 05:30 )             27.5     140  |  109[H]  |  20  ----------------------------<  89  3.9   |  22  |  1.06 (1.21)    Ca    8.3[L]      18 Dec 2024 05:30  Phos  4.2       Mg     2.1      @ 07:01   @ 07:00  --------------------------------------------------------  IN: 4315 mL / OUT: 3270 mL / NET: 1045 mL     @ 07:01  -   @ 07:38  --------------------------------------------------------  IN: 120 mL / OUT: 0 mL / NET: 120 mL      Bacteriology:  10/16 sputum culture acinetobacter  10/15 Blood CS NG5D    12/15 Sputum CS commensal iram  12/15 blood CS NG2D x1    CSF studies:  EEG:  Neuroimagin2024 9:30 AM	CT Brain	Large pontine hemorrhage, SAH, edema, no hydrocephalus. Multiple old infarcts.  2024 1:11 PM	CTA Head/Neck	Pontine hemorrhage stable, no malformation, stable ventricular size, no stenosis or aneurysm.  2024 6:40 PM	CT Brain	Enlarged pontine hemorrhage, SAH in frontal lobes.  2024        	CT Brain	Stable pontine hemorrhage, SAH stable.  10/03/2024 11:06 AM	CT Brain	Stable hematoma, no rebleeding.  10/12/2024 9:16 AM	MRI Brain	Stable hemorrhage, no new bleeding, cerebellar infarct.    Other imagin2024 7:20PM	CTA CHEST	No PE to lobar arteries; aspiration in RLL with groundglass opacity.  2024 7:50PM	US DPLX LE	No DVT in either lower extremity.  2024 6:47AM	CT ABD/PELVIS	Atelectasis; can't exclude pneumonia; no abdominal/pelvic infection.  10/25/2024 12:56AM	CT BRAIN	Evolving pontine hemorrhage, no rebleeding, worsened R mastoid opacification.  10/19/2024 1:13PM	US DPLX LE	No DVT in either lower extremity.  10/01/2024 11:49 AM	US Retroperit	No hydronephrosis; chronic renal disease; Vuong catheter.  10/01/2024 11:14 AM	XR Abdomen	Orogastric tube coiled in stomach; nonspecific bowel gas pattern.  10/07/2024 10:25 PM	XR Abdomen	Nonspecific bowel gas pattern.  10/08/2024 8:01 AM	US Retroperit	Normal kidneys; distended bladder; mild hydronephrosis.  10/10/2024 7:32 AM	XR Abdomen	Nasogastric tube; nonspecific bowel gas pattern.  10/19/2024 1:13 PM	US DPLX Veins	No DVT in lower extremities.    MEDICATIONS: 12-18    ·	heparin   Injectable 5000 SubCutaneous every 8 hours  ·	piperacillin/tazobactam IVPB.. 3.375 IV Intermittent every 8 hours  ·	amLODIPine   Tablet 5 milliGRAM(s) Oral daily  ·	doxazosin 8 milliGRAM(s) Oral at bedtime  ·	acetylcysteine 10%  Inhalation 4 Inhalation every 6 hours  ·	albuterol/ipratropium for Nebulization 3 Nebulizer every 6 hours  ·	pantoprazole  Injectable 40 IV Push daily  ·	insulin lispro (ADMELOG) corrective regimen sliding scale  SubCutaneous Before meals and at bedtime  ·	artificial tears (preservative free) Ophthalmic Solution 1 Right EYE every 4 hours  ·	erythromycin   Ointment 1 Right EYE at bedtime  ·	fluticasone propionate 50 MICROgram(s)/spray Nasal Spray 1 Both Nostrils two times a day  ·	ofloxacin 0.3% Solution 1 Right EYE four times a day  ·	petrolatum Ophthalmic Ointment 1 Both EYES two times a day  ·	sodium chloride 0.65% Nasal 1 Both Nostrils two times a day  ·	acetaminophen   Oral Liquid .. 650 Oral every 6 hours PRN    IV FLUIDS: NS@50cc/hr  DRIPS:  DIET: [place] at goal  Lines:   Drains:    Vuong (changed 12/15)  Wounds:    CODE STATUS:  Full Code                       GOALS OF CARE:  aggressive                      DISPOSITION:  ICU  ICH Score 3 =================================  NEUROCRITICAL CARE ATTENDING NOTE  =================================    GARETT DELEON   MRN-[id number]  Summary:  44y/M  unknown past medical history (reported stroke and MI by coworkers) presented to [facility] with AMS, Pt was working at [employer] when he was found down by coworkers. EMS called and pt brought to [facility] ED. Intubated, sedated, started on cardene for SBPs in 200s. CT head showed brain stem bleed. Transferred to NSICU for further management.  (30 Sep 2024 12:55)    COURSE IN THE HOSPITAL:  Date	POD	BD	Events	Surgery  	-	-	Transferred from [facility]. A-line placed. Versed discontinued. Hydralazine, 3% saline started. Changed propofol to precedex.	-  10/1	-	-	Stability CTH done. Added labetalol, started TF. Palliative and ethics consulted. Febrile 103°F, pan cultures sent.	-  10/2	-		Desaturation to 80s, FiO2 increased. Fentanyl given, ABG, CXR ordered. Propofol started. Vanc/CTX started. MRSA negative, vanc discontinued. Na 159, starting FW 250q6.	-  10/3	-	3	Na increasing, FW increased to 300q6. Bowel regimen discontinued. vEEG started.	-  10/4	-		Albumin bolus, increased LR. 7% hypersal started for 48hrs. Nephrology consulted for CKD. SBP 170s, hydralazine given.	-  10/5	-		Hydralazine and labetalol for SBP. RT placed for diarrhea.	-  10/6	-		FW increased to 350q4. IV Tylenol for temp 100.6°F.	-  10/7	-		Pancultured for temp 101.8°F.	-  10/8	-		Cr increase, LR decreased. Simethicone added. Increased hydralazine, labetalol. Na 145, FW 250q6. Retaining fluid, bladder scans ordered.	-  10/9	-		Abd x-ray for distention. Changed tube feeds to Jevity 1.5. Tolerating CPAP.	-  10/10	-	10	Vuong for urinary retention. Increased TF to goal. Family leaning toward trach/PEG.	-  10/11	-		Trach/PEG consults placed.	-  10/12	-		MRI brain complete.	-  10/13	-	13	Increased flomax. Hold SQH for trach.	-  10/14	-		S/p trach with pulmonary. NGT placed, CXR confirmed position.	-  10/15	-	15	Resumed feeds. Spiked fever 101°F, pan cultures sent.	-  10/16	-	16	Lokelma for K+ 5.4. Started vanc/zosyn for PNA. PEG held for fever.	-  10/17	-	17	Serum and urine osm ordered. Started sinemet, FW 100q4, IVF discontinued. MRSA negative, vanc discontinued. NGT replaced.	-  10/18	-	18	Amantadine added. Changed zosyn to unasyn for acinetobacter baumannii. Failed TOV, required SC.	-  10/19	-	19	Cardura added for retention. Decreased labetalol, hydralazine. Vuong replaced.	-  10/20	-	20	NGT dislodged, replaced. PEG scheduled for tomorrow. Lokelma for hyperkalemia.  10/21	-	21	s/p PEG  10/22	-	22	No significant events overnight. 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  trach collar 40%  10/24   -	24	No significant events overnight.   10/25   -	25	No significant events overnight.   10/26   -	26	Anisocoria overnight, given 23.4% x1, CT stable; briefly back on vent, ABG unremarkable, now back on trach collar; exam - following commands!  10/27   -	27	No significant events overnight.     Date	POD	BD	Events	  10/19		19	Cardura 2mg added for retention. Labetalol decreased. Vuong replaced.	  10/20		20	NGT dislodged, replaced. PEG placement scheduled for tomorrow. Free water increased. Labetalol decreased. Lokelma given for hyperkalemia.	  10/21	0	21	PEG placement with Gen Surg. Labetalol decreased. Cardura increased. Started Lokelma and Phoslo. Vuong discontinued.	PEG placement with Gen Surg  10/22		22	Plan to start tube feeding via PEG. Labetalol discontinued. CPAP 5/ for Cheyne-Vaughn respiration.	  10/23		23	Hydralazine discontinued. Trach collar trial. Rectal tube placed.	  10/24		24	Lokelma held for diarrhea. Cardura increased. Jevity switched to Nepro. Vuong placed for high urine output.	  10/25		25	Change in neuro status: anisocoria. Emergent CTH: resolved pontine hemorrhage, continued edema.	  10/26		26	Lokelma and free water resumed. 23.4% NaCl given. Emergent CTH: resolved pontine hemorrhage, continued edema.	  10/27		27	Neuro stable.	  10/28		28	Neuro stable. Miralax ordered. Vuong removed, pending TOV.	  10/29		29	2L NS over 8 hrs for increased BUN/Cr ratio. Vuong placed.	  10/31		31	Na 149, increased free water to 200q6. 1L NS for uptrending BUN.	  		32	1L NS for dehydration. Na 146, increased FW to 250q6.	  		33	Neuro stable. 500cc bolus for tachycardia.	  11/3		34	Neuro stable. Febrile to 101.9°F, pan cultured. 500cc NS bolus for sinus tachycardia.	  		35	Neuro stable. 1L NS for tachycardia. Sputum (+) for Stenotrophomonas.	  		36	Neuro stable. Vancomycin discontinued. Chest PT BID. ID consulted. Continue Zosyn.	  		37	Blood cx (+) for Klebsiella. Zosyn changed to Cefepime. CTAP ordered.	  		38	Maintenance fluids started. Pending CT A/P.	  		39	CT CAP negative for infection.	  11/10		41	Desat to 85 on trach collar, O2 increased. Sat 94%. Fever, pancultured. AM ABG pO2 79.	  		42	Tachycardia 130s, desat to 90 on 10L. Cough, temp 101.4, given 1g IV Tylenol and 500cc NS. Fever and HR downtrending.	  		43	Fever and HR downtrending, satting 97% on 70% FIO2.	  		44	Standing Tylenol for tachycardia. Desat to 80s, O2 increased. Pending CTA PE protocol.	  		45	Neuro stable. Resp therapy decreased FiO2 to 70%.	  11/15		46	Rapid called for desat 30s, tachycardia 140s. Upgraded to ICU. Vent started. POCUS and CXR unremarkable. ABG improved.	  		47	POCUS showed collapsible IVC, given 1L bolus. Vanco/Cefepime started for PNA. MRSA swab negative, Vanco discontinued.	  		48	1L bolus for tachycardia. Fever 101, cultured. 1.5L bolus, 5 IV Lopressor. Stenotrophomonas on sputum cx.	  		49	Consulted ID, Cefepime switched to Bactrim x7 days. Started Hydrochlorothiazine 12.5mg daily.	  		50	Tachycardia 120s, given Tylenol and 500cc NS. Tolerating 5/5, switched to TCx. Bactrim discontinued. Vuong discontinued.	  		51	Satting low 90s, mildly tachycardic to 110s. O2 improved on TCx. CPAP 5/5 placed back on.	  		52	Tolerating CPAP 5/5. HCTZ held for Cr bump, straight cath increased.	  		53	Resumed Phoslo. Vuong placed. Resumed HCTZ.	  		54	Holding Tylenol for possible fever, pan culture if febrile. Cr improved. Cont. CPAP.	  		55	Neuro stable. HCTZ dc'd, started Lisinopril 5. Lokelma dc'd for K 3.7.	  		56	Neuro stable. hypotension to 60s SBP, given fluid bolus  : BD 57, GEORGE overnight. Neuro stable. Dc'd phoslo.   : BD 58, GEORGE overnight. Neuro stable.    : BD 59. Vuong replaced.   : GEORGE.  : GEORGE, neuro stable.   : BD 62, GEORGE overnight  : BD 63, GEORGE overnight.; Given 1L bolus of LR for uptrending BUN/Cr.  12/3: BD 64. Reinstated eye gtt/moisture chamber given increased Rt eye injection  : BD 65. GEORGE overnight. Attempted to speak with ophtho regarding eyelid weight/closure but no answer, full mailbox.   : BD 66, GEORGE overnight, bowel regimen increased and had BM.   : BD 67, GEORGE overnight, neuro stable.  : GEORGE overnight, neuro stable. /110s, given x1 hydralazine 10 mg IVP. Restarted home amlodipine 5mg.  : GEORGE. OOB to chair.     : GEORGE, mucomyst added for thick secretions, simethicone for abd distension, abd xray with stool burden, increased bowel regimen.   : GEORGE overnight.   : GEORGE overnight.   : GEORGE overnight  12/15: o/n Patient became tachycardic HR 120s and 10 minutes later O2 sat dropped as low as 89%. Patient suctioned without improvement in O2 sat and tube feeds found in suction catheter. TFs held.  STAT CXR ordered. STAT labs sent. Respiratory therapist called to bedside and patient trach connected to ventilator. After connection to ventilator and further suctioning O2 sat improved to 97% but patient HR remains 120-130s. Upgraded to NSICU for further management. Vancomycin and zosyn started. CTA  chest PE protocol and CTH ordered. Blood cultures sent.given 500cc bolus, rpt ABG sent pO2 243, CTH and CTA chest done. FS while NPO, FiO2 dec 50 pending ABG. sputum cx positive for few GPC and GNR.   : GEORGE overnight, restarted amlodipine, troponin 75   : GEORGE overnight, neuro stable, CPAP x2 hours 10/6, apneic back to full vent; OOB to chair, sudden tachycardia 140's, L facial twitching, still following commands, given 2G levetiracetam, ativan 2mg x2, fluid bolus, metoprolol, EEG    full vent support overnight, albumin    Past Medical History: Acute myocardial infarction CVA (cerebral vascular accident)  Allergies:  Allergy Status Unknown  Home meds:     PHYSICAL EXAMINATION  T(C): 36.6 (24 @ 05:17), Max: 36.6 (24 @ 05:17) HR: 89 (24 @ 06:00) (72 - 137) BP: 139/92 (24 @ 06:00) (111/78 - 159/107) RR: 16 (24 @ 06:00) (9 - 20) SpO2: 98% (24 @ 06:00) (95% - 100%)  NEUROLOGIC EXAMINATION:  Patient awake, alert, Oriented x3 with choices, tracks, RANDELL, R UE hand  2/5 RLE wiggles L UE trace withdrawal L LE withdraws  GENERAL: full vent support 40% 400 14 +6  EENT:  anicteric,  CARDIOVASCULAR: (+) S1 S2, normal rate and regular rhythm  PULMONARY: clear to auscultation bilaterally (oral thick secretions)  ABDOMEN: soft, nontender with normoactive bowel sounds  EXTREMITIES: no edema  SKIN: no rash    LABS:   CAPILLARY BLOOD GLUCOSE 95 95 129 157     (6.67)  8.6  (8.9)  5.96  )-----------( 185      ( 18 Dec 2024 05:30 )             27.5     140  |  109[H]  |  20  ----------------------------<  89  3.9   |  22  |  1.06 (1.21)    Ca    8.3[L]      18 Dec 2024 05:30  Phos  4.2       Mg     2.1      @ 07:01   @ 07:00  --------------------------------------------------------  IN: 4315 mL / OUT: 3270 mL / NET: 1045 mL     @ 07:01  -   @ 07:38  --------------------------------------------------------  IN: 120 mL / OUT: 0 mL / NET: 120 mL      Bacteriology:  10/16 sputum culture acinetobacter  10/15 Blood CS NG5D    12/15 Sputum CS commensal iram  12/15 blood CS NG4D x1    CSF studies:  EEG:  Neuroimagin2024 9:30 AM	CT Brain	Large pontine hemorrhage, SAH, edema, no hydrocephalus. Multiple old infarcts.  2024 1:11 PM	CTA Head/Neck	Pontine hemorrhage stable, no malformation, stable ventricular size, no stenosis or aneurysm.  2024 6:40 PM	CT Brain	Enlarged pontine hemorrhage, SAH in frontal lobes.  2024        	CT Brain	Stable pontine hemorrhage, SAH stable.  10/03/2024 11:06 AM	CT Brain	Stable hematoma, no rebleeding.  10/12/2024 9:16 AM	MRI Brain	Stable hemorrhage, no new bleeding, cerebellar infarct.    Other imagin2024 7:20PM	CTA CHEST	No PE to lobar arteries; aspiration in RLL with groundglass opacity.  2024 7:50PM	US DPLX LE	No DVT in either lower extremity.  2024 6:47AM	CT ABD/PELVIS	Atelectasis; can't exclude pneumonia; no abdominal/pelvic infection.  10/25/2024 12:56AM	CT BRAIN	Evolving pontine hemorrhage, no rebleeding, worsened R mastoid opacification.  10/19/2024 1:13PM	US DPLX LE	No DVT in either lower extremity.  10/01/2024 11:49 AM	US Retroperit	No hydronephrosis; chronic renal disease; Vuong catheter.  10/01/2024 11:14 AM	XR Abdomen	Orogastric tube coiled in stomach; nonspecific bowel gas pattern.  10/07/2024 10:25 PM	XR Abdomen	Nonspecific bowel gas pattern.  10/08/2024 8:01 AM	US Retroperit	Normal kidneys; distended bladder; mild hydronephrosis.  10/10/2024 7:32 AM	XR Abdomen	Nasogastric tube; nonspecific bowel gas pattern.  10/19/2024 1:13 PM	US DPLX Veins	No DVT in lower extremities.    MEDICATIONS: 12-18    ·	heparin   Injectable 5000 SubCutaneous every 8 hours  ·	piperacillin/tazobactam IVPB.. 3.375 IV Intermittent every 8 hours  ·	amLODIPine   Tablet 5 milliGRAM(s) Oral daily  ·	doxazosin 8 milliGRAM(s) Oral at bedtime  ·	acetylcysteine 10%  Inhalation 4 Inhalation every 6 hours  ·	albuterol/ipratropium for Nebulization 3 Nebulizer every 6 hours  ·	pantoprazole  Injectable 40 IV Push daily  ·	insulin lispro (ADMELOG) corrective regimen sliding scale  SubCutaneous Before meals and at bedtime  ·	artificial tears (preservative free) Ophthalmic Solution 1 Right EYE every 4 hours  ·	erythromycin   Ointment 1 Right EYE at bedtime  ·	fluticasone propionate 50 MICROgram(s)/spray Nasal Spray 1 Both Nostrils two times a day  ·	ofloxacin 0.3% Solution 1 Right EYE four times a day  ·	petrolatum Ophthalmic Ointment 1 Both EYES two times a day  ·	sodium chloride 0.65% Nasal 1 Both Nostrils two times a day  ·	acetaminophen   Oral Liquid .. 650 Oral every 6 hours PRN    IV FLUIDS: NS@50cc/hr  DRIPS:  DIET: [place] at goal  Lines:   Drains:    Vuong (changed 12/15)  Wounds:    CODE STATUS:  Full Code                       GOALS OF CARE:  aggressive                      DISPOSITION:  ICU  ICH Score 3 =================================  NEUROCRITICAL CARE ATTENDING NOTE  =================================    GARETT DELEON   MRN-[id number]  Summary:  44y/M  unknown past medical history (reported stroke and MI by coworkers) presented to [facility] with AMS, Pt was working at [employer] when he was found down by coworkers. EMS called and pt brought to [facility] ED. Intubated, sedated, started on cardene for SBPs in 200s. CT head showed brain stem bleed. Transferred to NSICU for further management.  (30 Sep 2024 12:55)    COURSE IN THE HOSPITAL:  Date	POD	BD	Events	Surgery  	-	-	Transferred from [facility]. A-line placed. Versed discontinued. Hydralazine, 3% saline started. Changed propofol to precedex.	-  10/1	-	-	Stability CTH done. Added labetalol, started TF. Palliative and ethics consulted. Febrile 103°F, pan cultures sent.	-  10/2	-		Desaturation to 80s, FiO2 increased. Fentanyl given, ABG, CXR ordered. Propofol started. Vanc/CTX started. MRSA negative, vanc discontinued. Na 159, starting FW 250q6.	-  10/3	-	3	Na increasing, FW increased to 300q6. Bowel regimen discontinued. vEEG started.	-  10/4	-		Albumin bolus, increased LR. 7% hypersal started for 48hrs. Nephrology consulted for CKD. SBP 170s, hydralazine given.	-  10/5	-		Hydralazine and labetalol for SBP. RT placed for diarrhea.	-  10/6	-		FW increased to 350q4. IV Tylenol for temp 100.6°F.	-  10/7	-		Pancultured for temp 101.8°F.	-  10/8	-		Cr increase, LR decreased. Simethicone added. Increased hydralazine, labetalol. Na 145, FW 250q6. Retaining fluid, bladder scans ordered.	-  10/9	-		Abd x-ray for distention. Changed tube feeds to Jevity 1.5. Tolerating CPAP.	-  10/10	-	10	Vuong for urinary retention. Increased TF to goal. Family leaning toward trach/PEG.	-  10/11	-		Trach/PEG consults placed.	-  10/12	-		MRI brain complete.	-  10/13	-	13	Increased flomax. Hold SQH for trach.	-  10/14	-		S/p trach with pulmonary. NGT placed, CXR confirmed position.	-  10/15	-	15	Resumed feeds. Spiked fever 101°F, pan cultures sent.	-  10/16	-	16	Lokelma for K+ 5.4. Started vanc/zosyn for PNA. PEG held for fever.	-  10/17	-	17	Serum and urine osm ordered. Started sinemet, FW 100q4, IVF discontinued. MRSA negative, vanc discontinued. NGT replaced.	-  10/18	-	18	Amantadine added. Changed zosyn to unasyn for acinetobacter baumannii. Failed TOV, required SC.	-  10/19	-	19	Cardura added for retention. Decreased labetalol, hydralazine. Vuong replaced.	-  10/20	-	20	NGT dislodged, replaced. PEG scheduled for tomorrow. Lokelma for hyperkalemia.  10/21	-	21	s/p PEG  10/22	-	22	No significant events overnight. 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  trach collar 40%  10/24   -	24	No significant events overnight.   10/25   -	25	No significant events overnight.   10/26   -	26	Anisocoria overnight, given 23.4% x1, CT stable; briefly back on vent, ABG unremarkable, now back on trach collar; exam - following commands!  10/27   -	27	No significant events overnight.     Date	POD	BD	Events	  10/19		19	Cardura 2mg added for retention. Labetalol decreased. Vuong replaced.	  10/20		20	NGT dislodged, replaced. PEG placement scheduled for tomorrow. Free water increased. Labetalol decreased. Lokelma given for hyperkalemia.	  10/21	0	21	PEG placement with Gen Surg. Labetalol decreased. Cardura increased. Started Lokelma and Phoslo. Vuong discontinued.	PEG placement with Gen Surg  10/22		22	Plan to start tube feeding via PEG. Labetalol discontinued. CPAP 5/ for Cheyne-Vaughn respiration.	  10/23		23	Hydralazine discontinued. Trach collar trial. Rectal tube placed.	  10/24		24	Lokelma held for diarrhea. Cardura increased. Jevity switched to Nepro. Vuong placed for high urine output.	  10/25		25	Change in neuro status: anisocoria. Emergent CTH: resolved pontine hemorrhage, continued edema.	  10/26		26	Lokelma and free water resumed. 23.4% NaCl given. Emergent CTH: resolved pontine hemorrhage, continued edema.	  10/27		27	Neuro stable.	  10/28		28	Neuro stable. Miralax ordered. Vuong removed, pending TOV.	  10/29		29	2L NS over 8 hrs for increased BUN/Cr ratio. Vuong placed.	  10/31		31	Na 149, increased free water to 200q6. 1L NS for uptrending BUN.	  		32	1L NS for dehydration. Na 146, increased FW to 250q6.	  		33	Neuro stable. 500cc bolus for tachycardia.	  11/3		34	Neuro stable. Febrile to 101.9°F, pan cultured. 500cc NS bolus for sinus tachycardia.	  		35	Neuro stable. 1L NS for tachycardia. Sputum (+) for Stenotrophomonas.	  		36	Neuro stable. Vancomycin discontinued. Chest PT BID. ID consulted. Continue Zosyn.	  		37	Blood cx (+) for Klebsiella. Zosyn changed to Cefepime. CTAP ordered.	  		38	Maintenance fluids started. Pending CT A/P.	  		39	CT CAP negative for infection.	  11/10		41	Desat to 85 on trach collar, O2 increased. Sat 94%. Fever, pancultured. AM ABG pO2 79.	  		42	Tachycardia 130s, desat to 90 on 10L. Cough, temp 101.4, given 1g IV Tylenol and 500cc NS. Fever and HR downtrending.	  		43	Fever and HR downtrending, satting 97% on 70% FIO2.	  		44	Standing Tylenol for tachycardia. Desat to 80s, O2 increased. Pending CTA PE protocol.	  		45	Neuro stable. Resp therapy decreased FiO2 to 70%.	  11/15		46	Rapid called for desat 30s, tachycardia 140s. Upgraded to ICU. Vent started. POCUS and CXR unremarkable. ABG improved.	  		47	POCUS showed collapsible IVC, given 1L bolus. Vanco/Cefepime started for PNA. MRSA swab negative, Vanco discontinued.	  		48	1L bolus for tachycardia. Fever 101, cultured. 1.5L bolus, 5 IV Lopressor. Stenotrophomonas on sputum cx.	  		49	Consulted ID, Cefepime switched to Bactrim x7 days. Started Hydrochlorothiazine 12.5mg daily.	  		50	Tachycardia 120s, given Tylenol and 500cc NS. Tolerating 5/5, switched to TCx. Bactrim discontinued. Vuong discontinued.	  		51	Satting low 90s, mildly tachycardic to 110s. O2 improved on TCx. CPAP 5/5 placed back on.	  		52	Tolerating CPAP 5/5. HCTZ held for Cr bump, straight cath increased.	  		53	Resumed Phoslo. Vuong placed. Resumed HCTZ.	  		54	Holding Tylenol for possible fever, pan culture if febrile. Cr improved. Cont. CPAP.	  		55	Neuro stable. HCTZ dc'd, started Lisinopril 5. Lokelma dc'd for K 3.7.	  		56	Neuro stable. hypotension to 60s SBP, given fluid bolus  : BD 57, GEORGE overnight. Neuro stable. Dc'd phoslo.   : BD 58, GEORGE overnight. Neuro stable.    : BD 59. Vuong replaced.   : GEORGE.  : GEORGE, neuro stable.   : BD 62, GEORGE overnight  : BD 63, GEORGE overnight.; Given 1L bolus of LR for uptrending BUN/Cr.  12/3: BD 64. Reinstated eye gtt/moisture chamber given increased Rt eye injection  : BD 65. GEORGE overnight. Attempted to speak with ophtho regarding eyelid weight/closure but no answer, full mailbox.   : BD 66, GEORGE overnight, bowel regimen increased and had BM.   : BD 67, GEORGE overnight, neuro stable.  : GEORGE overnight, neuro stable. /110s, given x1 hydralazine 10 mg IVP. Restarted home amlodipine 5mg.  : GEORGE. OOB to chair.     : GEORGE, mucomyst added for thick secretions, simethicone for abd distension, abd xray with stool burden, increased bowel regimen.   : GEORGE overnight.   : GEORGE overnight.   : GEORGE overnight  12/15: o/n Patient became tachycardic HR 120s and 10 minutes later O2 sat dropped as low as 89%. Patient suctioned without improvement in O2 sat and tube feeds found in suction catheter. TFs held.  STAT CXR ordered. STAT labs sent. Respiratory therapist called to bedside and patient trach connected to ventilator. After connection to ventilator and further suctioning O2 sat improved to 97% but patient HR remains 120-130s. Upgraded to NSICU for further management. Vancomycin and zosyn started. CTA  chest PE protocol and CTH ordered. Blood cultures sent.given 500cc bolus, rpt ABG sent pO2 243, CTH and CTA chest done. FS while NPO, FiO2 dec 50 pending ABG. sputum cx positive for few GPC and GNR.   : GEORGE overnight, restarted amlodipine, troponin 75   : GEORGE overnight, neuro stable, CPAP x2 hours 10/6, apneic back to full vent; OOB to chair, sudden tachycardia 140's, L facial twitching, still following commands, given 2G levetiracetam, ativan 2mg x2, fluid bolus, metoprolol, EEG    full vent support overnight, albumin    Past Medical History: Acute myocardial infarction CVA (cerebral vascular accident)  Allergies:  Allergy Status Unknown  Home meds:     PHYSICAL EXAMINATION  T(C): 36.6 (24 @ 05:17), Max: 36.6 (24 @ 05:17) HR: 89 (24 @ 06:00) (72 - 137) BP: 139/92 (24 @ 06:00) (111/78 - 159/107) RR: 16 (24 @ 06:00) (9 - 20) SpO2: 98% (24 @ 06:00) (95% - 100%)  NEUROLOGIC EXAMINATION:  Patient awake, alert, Oriented x2-3 with choices, tracks, RANDELL, R UE hand  2/5 RLE wiggles L UE trace withdrawal L LE withdraws  GENERAL: full vent support 40% 400 14 +6  EENT:  anicteric,  CARDIOVASCULAR: (+) S1 S2, normal rate and regular rhythm  PULMONARY: clear to auscultation bilaterally (oral thick secretions)  ABDOMEN: soft, nontender with normoactive bowel sounds  EXTREMITIES: no edema  SKIN: no rash    LABS:   CAPILLARY BLOOD GLUCOSE 95 95 129 157     (6.67)  8.6  (8.9)  5.96  )-----------( 185      ( 18 Dec 2024 05:30 )             27.5     140  |  109[H]  |  20  ----------------------------<  89  3.9   |  22  |  1.06 (1.21)    Ca    8.3[L]      18 Dec 2024 05:30  Phos  4.2       Mg     2.1      @ 07:01   @ 07:00  --------------------------------------------------------  IN: 4315 mL / OUT: 3270 mL / NET: 1045 mL     @ 07:01  -   @ 07:38  --------------------------------------------------------  IN: 120 mL / OUT: 0 mL / NET: 120 mL      Bacteriology:  10/16 sputum culture acinetobacter  10/15 Blood CS NG5D    12/15 Sputum CS commensal iram  12/15 blood CS NG4D x1    CSF studies:  EEG:  Neuroimagin2024 9:30 AM	CT Brain	Large pontine hemorrhage, SAH, edema, no hydrocephalus. Multiple old infarcts.  2024 1:11 PM	CTA Head/Neck	Pontine hemorrhage stable, no malformation, stable ventricular size, no stenosis or aneurysm.  2024 6:40 PM	CT Brain	Enlarged pontine hemorrhage, SAH in frontal lobes.  2024        	CT Brain	Stable pontine hemorrhage, SAH stable.  10/03/2024 11:06 AM	CT Brain	Stable hematoma, no rebleeding.  10/12/2024 9:16 AM	MRI Brain	Stable hemorrhage, no new bleeding, cerebellar infarct.    Other imagin2024 7:20PM	CTA CHEST	No PE to lobar arteries; aspiration in RLL with groundglass opacity.  2024 7:50PM	US DPLX LE	No DVT in either lower extremity.  2024 6:47AM	CT ABD/PELVIS	Atelectasis; can't exclude pneumonia; no abdominal/pelvic infection.  10/25/2024 12:56AM	CT BRAIN	Evolving pontine hemorrhage, no rebleeding, worsened R mastoid opacification.  10/19/2024 1:13PM	US DPLX LE	No DVT in either lower extremity.  10/01/2024 11:49 AM	US Retroperit	No hydronephrosis; chronic renal disease; Vuong catheter.  10/01/2024 11:14 AM	XR Abdomen	Orogastric tube coiled in stomach; nonspecific bowel gas pattern.  10/07/2024 10:25 PM	XR Abdomen	Nonspecific bowel gas pattern.  10/08/2024 8:01 AM	US Retroperit	Normal kidneys; distended bladder; mild hydronephrosis.  10/10/2024 7:32 AM	XR Abdomen	Nasogastric tube; nonspecific bowel gas pattern.  10/19/2024 1:13 PM	US DPLX Veins	No DVT in lower extremities.    MEDICATIONS: 12-18    ·	heparin   Injectable 5000 SubCutaneous every 8 hours  ·	piperacillin/tazobactam IVPB.. 3.375 IV Intermittent every 8 hours  ·	amLODIPine   Tablet 5 milliGRAM(s) Oral daily  ·	doxazosin 8 milliGRAM(s) Oral at bedtime  ·	acetylcysteine 10%  Inhalation 4 Inhalation every 6 hours  ·	albuterol/ipratropium for Nebulization 3 Nebulizer every 6 hours  ·	pantoprazole  Injectable 40 IV Push daily  ·	insulin lispro (ADMELOG) corrective regimen sliding scale  SubCutaneous Before meals and at bedtime  ·	artificial tears (preservative free) Ophthalmic Solution 1 Right EYE every 4 hours  ·	erythromycin   Ointment 1 Right EYE at bedtime  ·	fluticasone propionate 50 MICROgram(s)/spray Nasal Spray 1 Both Nostrils two times a day  ·	ofloxacin 0.3% Solution 1 Right EYE four times a day  ·	petrolatum Ophthalmic Ointment 1 Both EYES two times a day  ·	sodium chloride 0.65% Nasal 1 Both Nostrils two times a day  ·	acetaminophen   Oral Liquid .. 650 Oral every 6 hours PRN    IV FLUIDS: NS@50cc/hr  DRIPS:  DIET: [place] at goal  Lines:   Drains:    Vuong (changed 12/15)  Wounds:    CODE STATUS:  Full Code                       GOALS OF CARE:  aggressive                      DISPOSITION:  ICU  ICH Score 3

## 2024-12-18 NOTE — PROGRESS NOTE ADULT - SUBJECTIVE AND OBJECTIVE BOX
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 s/p peg 10/21. Prolonged hospital stay inability to transfer to Rehab due to lack of insurance.   On 12/15 AM while in Stepdown unit patient became tachycardic HR 120s and 10 minutes later O2 sat dropped as low as 89%. Patient suctioned without improvement in O2 sat and tube feeds found in suction catheter. TFs held.  STAT CXR ordered. STAT labs sent. Respiratory therapist called to bedside and patient trach connected to ventilator. After connection to ventilator and further suctioning O2 sat improved to 97% but patient HR remains 120-130s. Upgraded to NSICU for further management.      ICU Vital Signs Last 24 Hrs  T(C): 36.9 (18 Dec 2024 17:59), Max: 36.9 (18 Dec 2024 17:59)  T(F): 98.5 (18 Dec 2024 17:59), Max: 98.5 (18 Dec 2024 17:59)  HR: 81 (18 Dec 2024 20:00) (72 - 109)  BP: 142/93 (18 Dec 2024 20:00) (133/88 - 168/113)  BP(mean): 113 (18 Dec 2024 20:00) (106 - 135)  RR: 15 (18 Dec 2024 20:00) (14 - 20)  SpO2: 100% (18 Dec 2024 20:00) (98% - 100%)      12-17-24 @ 07:01  -  12-18-24 @ 07:00  --------------------------------------------------------  IN: 4315 mL / OUT: 3270 mL / NET: 1045 mL    12-18-24 @ 07:01  -  12-18-24 @ 21:16  --------------------------------------------------------  IN: 635 mL / OUT: 1905 mL / NET: -1270 mL      Mode: AC/ CMV (Assist Control/ Continuous Mandatory Ventilation), RR (machine): 14, TV (machine): 400, FiO2: 40, PEEP: 6, ITime: 1, MAP: 5.8, PIP: 14      PHYSICAL EXAM:  Gen: Awake, trach to vent  Neurological exam   Mental status: Awake, attend, oriented x 2 with choices by looking down, does not fully track but able to do so with left eye,  (EOMI impaired on R), following commands  CV: S1/S2, RRR  Lungs: Decreased breath sounds bilaterally, no wheezing  Abdomen: Bowel sounds  Extremities:  No edema      MEDICATIONS:   acetaminophen   Oral Liquid .. 650 milliGRAM(s) Oral every 6 hours PRN  acetylcysteine 10%  Inhalation 4 milliLiter(s) Inhalation every 6 hours  albuterol/ipratropium for Nebulization 3 milliLiter(s) Nebulizer every 6 hours  amLODIPine   Tablet 5 milliGRAM(s) Oral daily  artificial tears (preservative free) Ophthalmic Solution 1 Drop(s) Right EYE every 4 hours  chlorhexidine 0.12% Liquid 15 milliLiter(s) Oral Mucosa every 12 hours  chlorhexidine 2% Cloths 1 Application(s) Topical <User Schedule>  doxazosin 8 milliGRAM(s) Oral at bedtime  erythromycin   Ointment 1 Application(s) Right EYE at bedtime  fluticasone propionate 50 MICROgram(s)/spray Nasal Spray 1 Spray(s) Both Nostrils two times a day  heparin   Injectable 5000 Unit(s) SubCutaneous every 8 hours  insulin lispro (ADMELOG) corrective regimen sliding scale   SubCutaneous Before meals and at bedtime  levETIRAcetam  Solution 1000 milliGRAM(s) Oral every 12 hours  ofloxacin 0.3% Solution 1 Drop(s) Right EYE four times a day  pantoprazole  Injectable 40 milliGRAM(s) IV Push daily  petrolatum Ophthalmic Ointment 1 Application(s) Both EYES two times a day  piperacillin/tazobactam IVPB.. 3.375 Gram(s) IV Intermittent every 8 hours  sodium chloride 0.65% Nasal 1 Spray(s) Both Nostrils two times a day      LABS:                      8.6    5.96  )-----------( 185      ( 18 Dec 2024 05:30 )             27.5     12-18    140  |  109[H]  |  20  ----------------------------<  89  3.9   |  22  |  1.06    Ca    8.3[L]      18 Dec 2024 05:30  Phos  4.2     12-18  Mg     2.1     12-18      ABG - ( 17 Dec 2024 14:01 )  pH, Arterial: 7.34  pH, Blood: x     /  pCO2: 40    /  pO2: 129   / HCO3: 22    / Base Excess: -3.9  /  SaO2: 98.4        ASSESSMENT AND PLAN  45 y/o PMH ?stroke/MI   Admitted with 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   S/P trach 10/14. s/p peg 10/21. Re-upgrade to ICU 2/2 desaturation event and suctioning requirements 11/15. Re-upgrade again on 12/15 for desaturation event.      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 hypovolemia?/hypoxia/respiratory distress?). RESOLVED.  CTA chest PE no PE in major vessels  -160. Vitals Q2  Holding amlodipine 5mg and lisinopril 5mg; resume as necessary  TTE (9/30) EF 75%. Repeat   Troponin; 51-> 75->  EKG 12/17  Telemetry monitoring.    RESPIRATION: Acute hypoxic respiratory failure requiring mechanical ventilation.   Full vent support/lung protective ventilation -> CPAP 12/16  Aggressive chest PT/duonebs, aggressive pulmonary toilet  CTA chest PA study negative for PE in major vessels; did show near-complete to complete LLL atelectasis. Partial RLL atelectasis  Consider pulm consult if difficulty weaning/ pulm optimization    GI; Episode of vomiting with aspiration   TF via PEG (placed 10/21 by gen surg). Trickle feeds. Advance as tolerated  Bowel regimen, last BM 12/14  PPI while on positive pressure  Abdominal Xray negative for ileus    RENAL: ANIKA on CKD. Resolved  Urine retention: Replaced gabriel catheter 12/15  IVF 50cc/hr. IVL once tolerating  Free water flushes  CKD: Trend BUN/Cr  Renal US 10/1: echogenicity consistent with chronic med renal disease, repeat 10/8. Increased renal echogenicity consistent with medical renal disease with increased hydronephrosis    ENDO:  A1c 5.4  Avoid hypoglycemia. Fingersticks, ISS    HEME:  DVT ppx: SCDs, SQH 5000u q8h   LE dopplers negative 11/11  CTA chest PA study negative for PE    ID: Tachycardic/hypoxic, aspiration event. Suspect aspiration PNA  On Vanc and Zosyn  Pending results of sputum/blood cultures   S/P stenotrophomonas maltophilia PNA: s/p Bactrim (11/18-11/19) s/p Cefepime (11/16-11/18)  11/3, (+) sputum for stenotrophomas maltophlia, blood cx (+) klebsiella, cefepime 2gq12 (11/6 - 11/12)   Empiric tx: s/p zosyn (11/3-11/6) , s/p vanc  (11/3-11/5)  S/P Ancef (10/4-10/14) for PNA, and s/p Unasyn (10/18-10/23) +actinobacter baumanii     MISC:  Ophthalmology consulted for keratitis  Erythromycin ointment to rt eye q4hrs, ofloxacin ointment to rt eye QID, artificial tears to rt eye q2hrs, moisture chamber at bedtime    MISC:    SOCIAL/FAMILY:  [] awaiting [x] updated at bedside [] family meeting    CODE STATUS:  [x] Full Code [] DNR [] DNI [] Palliative/Comfort Care    DISPOSITION:  [x] ICU [] Stroke Unit [] Floor [] EMU [] RCU [] PCU    Time spent: 45 critical care minutes                           NSCU ATTENDING -- ADDITIONAL PROGRESS NOTE    Nighttime rounds were performed       HPI   Patient is a 45 y/o male with PMH ?stroke/MI initially admitted on 9/30 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 s/p peg 10/21. Prolonged hospital stay inability to transfer to Rehab due to lack of insurance.   On 12/15 AM while in stepdown unit patient became tachycardic HR 120s and 10 minutes later O2 sat dropped as low as 89%. Patient suctioned without improvement in O2 sat and tube feeds found in suction catheter. TFs held.  STAT CXR ordered. STAT labs sent. Respiratory therapist called to bedside and patient trach connected to ventilator. After connection to ventilator and further suctioning O2 sat improved to 97% but patient HR remains 120-130s. Upgraded to NSICU for further management.      ICU Vital Signs Last 24 Hrs  T(C): 36.9 (18 Dec 2024 17:59), Max: 36.9 (18 Dec 2024 17:59)  T(F): 98.5 (18 Dec 2024 17:59), Max: 98.5 (18 Dec 2024 17:59)  HR: 81 (18 Dec 2024 20:00) (72 - 109)  BP: 142/93 (18 Dec 2024 20:00) (133/88 - 168/113)  BP(mean): 113 (18 Dec 2024 20:00) (106 - 135)  RR: 15 (18 Dec 2024 20:00) (14 - 20)  SpO2: 100% (18 Dec 2024 20:00) (98% - 100%)      12-17-24 @ 07:01  -  12-18-24 @ 07:00  --------------------------------------------------------  IN: 4315 mL / OUT: 3270 mL / NET: 1045 mL    12-18-24 @ 07:01  -  12-18-24 @ 21:16  --------------------------------------------------------  IN: 635 mL / OUT: 1905 mL / NET: -1270 mL      Mode: AC/ CMV (Assist Control/ Continuous Mandatory Ventilation), RR (machine): 14, TV (machine): 400, FiO2: 40, PEEP: 6, ITime: 1, MAP: 5.8, PIP: 14      PHYSICAL EXAM:  Gen: Awake, trach to vent  Neurological exam   Mental status: Awake, attend, oriented x 2 with choices by looking down, does not fully track but able to do so with left eye,  (EOMI impaired on R), following commands  CV: S1/S2, RRR  Lungs: Decreased breath sounds bilaterally, no wheezing  Abdomen: Bowel sounds  Extremities:  No edema      MEDICATIONS:   acetaminophen   Oral Liquid .. 650 milliGRAM(s) Oral every 6 hours PRN  acetylcysteine 10%  Inhalation 4 milliLiter(s) Inhalation every 6 hours  albuterol/ipratropium for Nebulization 3 milliLiter(s) Nebulizer every 6 hours  amLODIPine   Tablet 5 milliGRAM(s) Oral daily  artificial tears (preservative free) Ophthalmic Solution 1 Drop(s) Right EYE every 4 hours  chlorhexidine 0.12% Liquid 15 milliLiter(s) Oral Mucosa every 12 hours  chlorhexidine 2% Cloths 1 Application(s) Topical <User Schedule>  doxazosin 8 milliGRAM(s) Oral at bedtime  erythromycin   Ointment 1 Application(s) Right EYE at bedtime  fluticasone propionate 50 MICROgram(s)/spray Nasal Spray 1 Spray(s) Both Nostrils two times a day  heparin   Injectable 5000 Unit(s) SubCutaneous every 8 hours  insulin lispro (ADMELOG) corrective regimen sliding scale   SubCutaneous Before meals and at bedtime  levETIRAcetam  Solution 1000 milliGRAM(s) Oral every 12 hours  ofloxacin 0.3% Solution 1 Drop(s) Right EYE four times a day  pantoprazole  Injectable 40 milliGRAM(s) IV Push daily  petrolatum Ophthalmic Ointment 1 Application(s) Both EYES two times a day  piperacillin/tazobactam IVPB.. 3.375 Gram(s) IV Intermittent every 8 hours  sodium chloride 0.65% Nasal 1 Spray(s) Both Nostrils two times a day      LABS:                      8.6    5.96  )-----------( 185      ( 18 Dec 2024 05:30 )             27.5     12-18    140  |  109[H]  |  20  ----------------------------<  89  3.9   |  22  |  1.06    Ca    8.3[L]      18 Dec 2024 05:30  Phos  4.2     12-18  Mg     2.1     12-18      ABG - ( 17 Dec 2024 14:01 )  pH, Arterial: 7.34  pH, Blood: x     /  pCO2: 40    /  pO2: 129   / HCO3: 22    / Base Excess: -3.9  /  SaO2: 98.4        ASSESSMENT AND PLAN  45 y/o PMH ?stroke/MI   Admitted with 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   S/P trach 10/14. s/p peg 10/21. Re-upgrade to ICU 2/2 desaturation event and suctioning requirements 11/15. Re-upgrade again on 12/15 for desaturation event.      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 DC  CT head 12/18 stable  Activity: PT/OT     CV; Tachycardia in the setting of hypoxia - (PE? hypovolemia?/hypoxia/respiratory distress?). RESOLVED.  CTA chest PE no PE in major vessels  -160. Vitals Q2  Continue amlodipine 5mg (lisinopril held)  TTE (9/30) EF 75%. Repeat TTE reviewed   Troponin; 51-> 75->53    RESPIRATION: Acute hypoxic respiratory failure requiring mechanical ventilation.   Full vent support/lung protective ventilation. CPAP as tolerated  Aggressive chest PT/duonebs, aggressive pulmonary toilet  CTA chest PA study negative for PE in major vessels; did show near-complete to complete LLL atelectasis. Partial RLL atelectasis    GI  TF via PEG at goal  Bowel regimen, last BM 12/18  PPI  Abdominal Xray negative for ileus    RENAL: ANIKA on CKD. Resolved  Urine retention: Replaced gabriel catheter 12/15  Free water flushes  CKD: Trend BUN/Cr    ENDO:  A1c 5.4  Fingersticks, ISS    HEME:  DVT ppx: SCDs, SQH 5000u q8h   LE dopplers negative 11/11  CTA chest PA study negative for PE    ID: Tachycardic/hypoxic, aspiration event. Suspect aspiration PNA  S/P Vanc. On Zosyn  Blood cultures NGTD. Sputum stenotrophomonas, GPC/GNR.      MISC:  Ophthalmology consulted for keratitis  Erythromycin ointment to rt eye q4hrs, ofloxacin ointment to rt eye QID, artificial tears to rt eye q2hrs, moisture chamber at bedtime    MISC:    SOCIAL/FAMILY:  [] awaiting [x] updated at bedside [] family meeting    CODE STATUS:  [x] Full Code [] DNR [] DNI [] Palliative/Comfort Care    DISPOSITION:  [x] ICU [] Stroke Unit [] Floor [] EMU [] RCU [] PCU    Time spent: 45 critical care minutes

## 2024-12-18 NOTE — PROGRESS NOTE ADULT - ASSESSMENT
44y/M with  large pontine hemorrhage, SAH, brain edema; ?locked-in  CVA  h/o MI  acute on chronic CKD, hydronephrosis    PLAN:   NEURO: neurochecks VS q4 VSq1h, PRN pain meds   neurostim: off   palliative / ethics follow-up  off VEEG - neg  REHAB:  physical therapy evaluation and management    EARLY MOB:  bedrest HOB up    PULM:  CPAP 10/6 - wean to 8/6, pulmo toilette, standing ipratropium / albuterol / mucomyst; chest PT, HOB up  CARDIO:  SBP goal 100-160mm Hg, EF 75%  ENDO:  Blood sugar goals 140-180 mg/dL, A1C 5.4, ISS  GI:  off bowel regimen (diarrhea)  DIET: TF banatrol  RENAL:  keep IVF for now until tomorrow, Na goal 135-145, TOV cont doxazosin 8;   HEM/ONC:  Hb stable  VTE Prophylaxis: SCDs, SQH  ID: afebrile, no leukocytosis, piperacillin/tazobactam until 12/20  Social: will update family    Active issues:  What's keeping patient in the ICU? vent   What is this patient's dispo plan? stepdown once stable    ATTENDING ATTESTATION:  Patient at high risk for neurological deterioration or death due to:  ICU delirium, aspiration PNA, DVT / PE.  Critical care time:  I have personally provided 60 minutes of critical care time, excluding time spent on separate procedures.      Plan discussed with RN, house staff.   44y/M with  large pontine hemorrhage, SAH, brain edema; ?locked-in  CVA  h/o MI  acute on chronic CKD, hydronephrosis    PLAN:   NEURO: neurochecks VS q4 VSq1h, PRN pain meds   neurostim: off   palliative / ethics follow-up  VEEG - f/u read, if NEG d/c; start levetiracetam 1G BID   CT scan today  REHAB:  physical therapy evaluation and management    EARLY MOB:  bedrest HOB up    PULM:  no weaning, pulmo toilette, standing ipratropium / albuterol / mucomyst; chest PT, HOB up  CARDIO:  SBP goal 100-160mm Hg, EF 75%  ENDO:  Blood sugar goals 140-180 mg/dL, A1C 5.4, ISS  GI:  off bowel regimen (diarrhea)  DIET: TF banatrol - increase to goal  RENAL:  d/c IVF, Na goal 135-145  HEM/ONC:  Hb stable  VTE Prophylaxis: SCDs, SQH  ID: afebrile, no leukocytosis, piperacillin/tazobactam until 12/20  Social: will update family    Active issues:  What's keeping patient in the ICU? vent   What is this patient's dispo plan? stepdown once stable    ATTENDING ATTESTATION:  Patient at high risk for neurological deterioration or death due to:  ICU delirium, aspiration PNA, DVT / PE.  Critical care time:  I have personally provided 60 minutes of critical care time, excluding time spent on separate procedures.      Plan discussed with RN, house staff.

## 2024-12-18 NOTE — EEG REPORT - NS EEG TEXT BOX
[facility] Department of Neurology  Epilepsy Monitoring Unit video-Electroencephalogram  [address] [place] [address], [address] [place] [id number], T: [phone]    Patient Name:	GARETT DELEON    :	1980  MRN:	[id number]    Study Start Date/Time:	2024, 3:14:20 PM  Study End Date/Time:    Referred by:  Dr D Amico    Brief Clinical History:  GARETT DELEON is a 44 year old Male; presented with AMS found to have brainstem bleed study performed to investigate for seizures or markers of epilepsy.   Technologist notes: -  Diagnosis Code:  R56.9 convulsions/seizure    Patient Medication:  None    Acquisition Details:  Electroencephalography was acquired using a minimum of 21 channels on an [employer] system v 9.3.1 with electrode placement according to the standard International 10-20 system following ACNS (American Clinical Neurophysiology Society) guidelines.  Anterior temporal T1 and T2 electrodes were utilized whenever possible.  The [employer] automated spike & seizure detections were reviewed in detail, in addition to the entire raw EEG.  The live video was monitored continuously by trained technicians to identify events and specialty nurses trained in seizure management supervised the care of the patient in the epilepsy unit.    Findings:  Day 1 2024, 3:14:20 PM to next morning at 07:00 AM.  Background:  continuous, with predominantly  beta >theta frequencies. with faster beta activity in anterior regions  Voltage:  Low (most <20uV LBP Peak-Trough)  Organization:  Appropriate anterior-posterior gradient  Posterior Dominant Rhythm:  No clear PDR absent  Sleep:  Symmetric, synchronous spindles and K complexes.  Focal abnormalities:  No persistent asymmetries of voltage or frequency.  Spontaneous Activity:  No epileptiform discharges       Events:  •	No electrographic seizures or significant clinical events occurred during this study.  Provocations:  •	Hyperventilation: was not performed.  •	Photic stimulation: was not performed.  Daily Summary:    •	Mild diffuse generalized attenuated in voltage indicating mild diffuse cerebral dysfunction  •	No epileptiform discharges or electrographic seizures      Bessie Martinez MD  CNP Fellow

## 2024-12-18 NOTE — PROGRESS NOTE ADULT - SUBJECTIVE AND OBJECTIVE BOX
NEUROCRITICAL CARE PROGRESS NOTE    GARETT DELEON   MRN-[id number]  Summary:  Vancomycin Level, Trough: 37.3 ug/mL ( @ 16:23)  /  HPI:  Unknown age male, unknown past medical history (reported stroke and MI by coworkers) presented to [facility] with AMS, Pt was working at [employer] when he was found down by coworkers. EMS called and pt brought to [facility] ED. Intubated, sedated, started on cardene for SBPs in 200s. CT head showed brain stem bleed. Transferred to NSICU for further management.  (30 Sep 2024 12:55)      S/Overnight events:  GEORGE overnight. Neuro stable.    Hospital Course:  : transferred from [facility]. A line placed. Versed dc'd. Cy Rader at bedside, states pt has family in [place], cannot confirm medications or PMH other than stroke and MI. 250cc bolus 3% given. LR switched to NS. hydralazine 25q8 started, 3% started, switched propofol to precedex   10/1: stability CTH done. Added labetalol, started TF. Palliative consulted. ethics consulted to determine surrogate. febrile 103, pan cx sent  10/2: BD 2, GEORGE overnight. TF resumed. Desatt'd to 80s, FiO2 inc. to 50. Fentanyl given, ABG, CXR ordered. Maxxed on precedex, started on propofol for DARIEN -4 - -5. Precedex dc'd. Duonebs, mucomyst, hypertonic added. 3% dc'd. Cardene dc'd. Start vanc/CTX. Increased labetalol 200q8. MRSA negative, dc'd vanc. ETT pulled back 2cm x 2, good positioning after confirmatory chest xray. Ethics attempting to establish HCP with family. Na 159, starting FW 250q6 for range 150-155.   10/3: BD3, GEORGE o/n, neuro stable. Na elevating, FW increased to 300q6. Dc'd bowel reg for diarrhea. vEEG started. SQH 5000q8 tonight.   10/4: BD 4, albumn bolus, incr. LR to 80 2/2 incr. in Cr, LR to 100cc/hr for uptrending Cr. Started 7% hypersal for 48hrs and SL atropine for inline/oral thick secretions. Dc'd CTX and started ancef for MSSA in the sputum. Nephrology consulted for CKD, f/u recs. SBP 170s, given hydralazine 10mg IVP.   10/5: BD5, o/n 10mg IVP hydralazine given for SBP 170s and started on hydralazine 25q8 via OGT. 10mg IV push labetalol for SBP > 160s. RT placed for diarrhea.   10/6: BD6, o/n FW increased to 350q4 per nephrology recs. IV tylenol for temp 100.6, SBp 160s presumed uncomfortable.   10/7: BD7, overnight pancultured for temp 101.8F.   10/8: BD8. GEORGE. Cr bumped. decreased LR to 75cc/hr. Adding simethicone ATC. incr hydralazine 50mgTID. Incr labetalol 300mgTID. Na 145, decreased FWF to 250q6. Start precedex. FENa consistent with intrinsic kidney injury. Pend repeat renal US. Retaining up to 1.3L, bladder scans q6, straight cath PRN  10/9: BD 9. GEORGE overnight. Neuro stable. abd xray for distention w non-specific gas pattern, OGT to LIWS for morning. duonebs/mucomyst to q8 for improving secretions. Changed tube feeds to Jevity 1.5 20cc/hr, low rate due to abdominal distention, nepro dense and more difficult to digest. Tolerating CPAP, confirmed by ABG.   10/10: BD 10. GEORGE overnight. Neuro stable. (+) gabriel for urinary retention on bladder scan. inc TF to goal rate of 40cc/hr. family leaning toward pursuing trach/PEG. 1/2 amp for FS 81.   10/11: BD 11. GEORGE overnight. Neuro stable. Trach/PEG consults placed.   10/12: BD 12. GEORGE overnight. Neuro stable. MRI brain complete.   10/13: BD 13. Increase flomax. Hold SQH after PM dose for trach tm. IVL.   10/14: BD 14. GEOREG overnight, remains on AC/VC. Gabriel placed for urinary retention. Dc'd free water.  S/p trach with pulm. NGT placed and CXR confirmed in good position.   10/15: BD 15, GEORGE ovn. resumed feeds. spiked 101, pan cx sent.   10/16: BD 16. GEORGE ovn. Lokelma 5mg for K+ 5.4. Started vanc q 24/zosyn for empiric PNA coverage, IVF to 100/hr. PEG held for fever.   10/17: BD 17,  ordered serum osm and urine osm for am. Started sinemet for neurostimulation. Increased cardura to 0.8. Started FW 100q4, dc'd IVF. MRSA negative, dc'd vanc. NGT replaced d/t coiling.   10/18: BD 18, GEORGE overnight, neuro stable. Amantadine added for neurostim. zosyn changed to unasyn for acinetobacter baumannii, failed TOV and required SC  10/19: BD 19, GEORGE ovn. cardura 2mg added for retention. labetalol decreased 200q8, hydralazine decreased 25q8. Gabriel replaced.   10/20: BD20, GEORGE overnight. NGT dislodged, replaced. PEG tomorrow w/ gen surg, FW increased to 150q4 and labetalol decreased to 100q8, lokelma given for hyperkalemia.   10/21: BD 21. POD0 PEG placement with Gen surg. decr labetolol to 50q8, incr. cardura to 0.4, started lokelma and phoslo, dc gabriel POD0 PEG placement with Gen surg.  10/22: BD 22. Plan to start TF today via PEG. dc labetalol, Following ophtho recs. Increased apnea settings - found to be in cheyne-moe respiration. CPAP .  10/23: BD 23. hydralazine d/c'd, trach collar trial today. Rectal tube placed at 6am.  10/24: BD24, o/n lokelma held due to diarrhea. Free water 100q6 resumed. dc'd tamsulosin, amantadine. Incr'd cardura to 8mg qhs. Dc'd FW. Switched jevity to nepro. gabriel placed for high urine output. Started SL atropine for oral secretions. Dc'd free water.  10/25: BD25, o/n decreased suctioning requirements to > q4hrs, GEORGE. Cr improving, cont phoslo, lokelma held at this time. Gabriel placed yest, cont. Tolerating trach collar. Given 500cc plasmalyte bolus for ANIKA. Dc'd sinemet.   10/26: BD26, o/n resumed lokelma 5mg daily and resumed 100cc free water q6hrs. Change in neuro status with new right pupillary dilation with anisocoria (right pupil 6mm fixed and left pupil 3mm briskly reactive). Given 23.4% NaCl bullet, taken for emergent CTH showing mostly resolved pontine hemorrhage, continued brainstem hypodensity likely edema d/t hemorrhage, no new hemorrhage or infarct, no herniation, mild increase in size of left lateral ventricle. Vitals remaine stable. Na goal > 140.   10/27: BD27, o/n GEORGE.Neuro stable. Pend stepdown with airway bed.   10/28: BD 28. GEORGE overnight. Neuro stable. Miralax ordered. Gabriel removed, pending TOV.  10/29: BD 29. GEORGE o/n. Given 2L NS over 8 hrs for increased BUN/Cr ratio. Gabriel placed for frequent straight cath.   10/30: BD 30.   10/31: BD 31. GEORGE overnight. Na 149, increased free water to 200q6. 1L NS for uptrending BUN.   : BD 32. GEORGE overnight. Given 1L NS for dehydration. Na 146, increased FW to 250q6.   : BD 33 GEORGE overnight, neuro stable, given 500cc bolus for net negative status and tachycardia   11/3: BD 34, GEORGE overnight, neuro stable. Patient remains tachycardic, EKG showing sinus tachycardia, given additional 500cc NS bolus. Febrile to 101.9F, pan cultured (without UA), CXR WNL, given tylenol.   : BD 35, GEORGE overnight, neuro stable. Given 1L NS for tachycardia. sputum (+) for stenotropohomas maltophilia.   : BD 36 GEORGE overnight, neuro stable. Vancomycin dc'd. Chest PT BID. ID consulted, cont zosyn.  : BD 37. blood cx + klebsiella dc zosyn changed to cefepime, CTAP ordered, rpt blood cx sent.    : BD 38. Pending CT A/P, given 250cc bolus and starting maintenance fluids overnight. Pending CT A/P after bolus   : BD 39. CT CAP negative for infection.   : BD 40. GEORGE overnight.  11/10: BD 41. GEORGE overnight. desat to 85 on trach collar, O2 inc to 10L and 100%, O2 sat inc to 95. pt tachy to 110s, euvolemic. given tylenol. ABG and CXR ordered. spiked fever, pancultured, RVP negative. AM ABG w pO2 79, rpt w pO2 79. pt appears comfortable, satting 94%.   : BD 42. GEORGE overnight. pt became tachy to 130s, desat to 90 on 100% FiO2 and 10L. suctioned, (+) productive cough. temp 101.4, given 1g IV tylenol and 500cc NS bolus for euvolemia. fever and HR downtrending. LE dopplers negative for dvt  : BD 43, GEORGE ovn, fever and HR downtrending, satting 97% 70% FIO2  : BD 44, GEORGE ovn. started standing tylenol x24 hours for tachycardia. desat to 80s, o2 increased. CXR stable, pending CTA PE protocol.   : BD 45, GEORGE overnight, neuro stable. resp therapy dec FiO2 to 70%.   11/15: BD 46, GEORGE overnight, neuro stable.  Rapid called for desaturation 30s, tachycardic 140s. Patient bagged, 100% fio2, heavily suctioned. CXR/POCUS unremarkable. ABG c/w desaturation. WBC 14.71. Afebrile. O2 improved to 90s and patient upgraded to ICU. ABG paO2 30s improved to 89 on vent. IV Tylenol x 1, sputum sent. Start protonix while o-n vent.   : BD 47. POCUS showed collapsable IVCF, given 1L bolus. Vanco/Cefpime added empriic for PNA, NGT feeds restarted. MRSA swab neg, Vanc DC'd.   : BD 48. GEORGE overnight. 1l bolus for tachycardia. Spiked to 101, cultured. 500cc bolus for tachycardia, tachy to 148 given 25mcg fentanyl, 250cc albumin, 1.5L bolus. 5 IV lopressor with response HR to 100s. +Stenotrophomonas on sputum cx.   : BD 49. GEORGE overnight. Consulted ID, cefepime switched to bactrim x7days. Started hydrochlorothiazine 12.5mg daily.  : BD 50. Tachy 120s, given tylenol and 500cc NS. Tolerating 5/5, switched to TCx. Holding phos binder. D/c Bactrim. D/c gabriel, f/u TOV. Dc'd PPI.   : BD 51. GEORGE ovn. 1600 satting low 90s, mildly tachy to 110s, afebrile, RR wnl. O2 improved to mid 90s while inc O2 to 100% on TCx. CPAP 5/5 placed back on.  : BD 52, GEORGE ovn, tolerating CPAP 5/5. Switch to trach collar during the day if tolerating well. HCTZ held for Cr bump, straight cath frequence increased to q4  : BD 53, GEORGE ovn. Resumed phoslo. Gabriel placed. Resumed HCTZ.   : BD 54. Holding tylenol in setting of possible fever, will require pan cx if febrile. Cr improved today. Cont CPAP. Bowel regimen held i/s/o diarrhea. FOBT negative.  : BD 55. GEORGE overnight. Neuro stable. HCTZ dc'ed, started lisinopril 5. Lokelma dc'ed for K 3.7.   : BD 56, GEORGE overnight. Neuro stable. dc'd lisinopril 5mg. Gabriel dc'd. TOV. 1545 noted to be hypotensive, MAP 50, in supine position on chair, HR 60s, afebrile, O2 96%. Given 1L cc NS bolus, placed back on bed in reverse trendelenberg, improved to map of 66. Neostick at bedside. Vitals check q1h. Dc'ed amlodipine. Failed TOV, bladder scan q6, sc prn. Added back Senna.   : BD 57, GEORGE overnight. Neuro stable. Dc'd phoslo.   : BD 58, GEORGE overnight. Neuro stable.    : BD 59. Gabriel replaced.   : GEORGE.  : GEORGE, neuro stable.   : BD 62, GEORGE overnight  : BD 63, GEORGE overnight.; Given 1L bolus of LR for uptrending BUN/Cr.  12/3: BD 64. Reinstated eye gtt/moisture chamber given increased Rt eye injection  : BD 65. GEORGE overnight. Attempted to speak with ophtho regarding eyelid weight/closure but no answer, full mailbox.   : BD 66, GEORGE overnight, bowel regimen increased and had BM.   : BD 67, GEORGE overnight, neuro stable.  : GEORGE overnight, neuro stable. /110s, given x1 hydralazine 10 mg IVP. Restarted home amlodipine 5mg.  : GEORGE. OOB to chair.     : GEORGE, mucomyst added for thick secretions, simethicone for abd distension, abd xray with stool burden, increased bowel regimen.   : GEORGE overnight.   : GEORGE overnight.   : GEORGE overnight  12/15: o/n Patient became tachycardic HR 120s and 10 minutes later O2 sat dropped as low as 89%. Patient suctioned without improvement in O2 sat and tube feeds found in suction catheter. TFs held.  STAT CXR ordered. STAT labs sent. Respiratory therapist called to bedside and patient trach connected to ventilator. After connection to ventilator and further suctioning O2 sat improved to 97% but patient HR remains 120-130s. Upgraded to NSICU for further management. Vancomycin and zosyn started. CTA  chest PE protocol and CTH ordered. Blood cultures sent.given 500cc bolus, rpt ABG sent pO2 243, CTH and CTA chest done. FS while NPO, FiO2 dec 50 pending ABG. sputum cx positive for few GPC and GNR.   : GEORGE overnight, restarted amlodipine, troponin 75   : GEORGE overnight, neuro stable. Attempted CPAP this morning, did not tolerate and back on full vent support. Dc'd Vanc. Tachycardia to 140s, noted extremities to be twitching along with jaw twitching. Given 2g of Keppra, total 4mg ativan and placed on EEG, full set of labs and lactate negative. Resumed trickle feeds at 20cc/hr. Given 250cc albumin for tachycardia.   : GEORGE overnight. Neuro stable.    Vital Signs Last 24 Hrs  T(C): 36.4 (17 Dec 2024 22:07), Max: 36.7 (17 Dec 2024 07:00)  T(F): 97.6 (17 Dec 2024 22:07), Max: 98.1 (17 Dec 2024 07:00)  HR: 95 (18 Dec 2024 02:00) (59 - 137)  BP: 154/102 (18 Dec 2024 02:00) (111/78 - 159/105)  BP(mean): 120 (18 Dec 2024 02:00) (90 - 128)  RR: 18 (18 Dec 2024 02:00) (9 - 20)  SpO2: 100% (18 Dec 2024 02:00) (95% - 100%)    Parameters below as of 18 Dec 2024 03:00  Patient On (Oxygen Delivery Method): ventilator    O2 Concentration (%): 40    Mode: AC/ CMV (Assist Control/ Continuous Mandatory Ventilation), RR (machine): 14, TV (machine): 400, FiO2: 40, PEEP: 6, ITime: 1, MAP: 8, PIP: 14    I&O's Detail    16 Dec 2024 07:01  -  17 Dec 2024 07:00  --------------------------------------------------------  IN:    Enteral Tube Flush: 200 mL    IV PiggyBack: 200 mL    Miscellaneous Tube Feedin mL    sodium chloride 0.9%: 1200 mL  Total IN: 2220 mL    OUT:    Indwelling Catheter - Urethral (mL): 1296 mL  Total OUT: 1296 mL    Total NET: 924 mL      17 Dec 2024 07:01  -  18 Dec 2024 02:15  --------------------------------------------------------  IN:    Enteral Tube Flush: 210 mL    IV PiggyBack: 300 mL    IV PiggyBack: 350 mL    IV PiggyBack: 300 mL    Miscellaneous Tube Feedin mL    sodium chloride 0.9%: 475 mL    sodium chloride 0.9%: 900 mL    Sodium Chloride 0.9% Bolus: 1000 mL  Total IN: 3835 mL    OUT:    Indwelling Catheter - Urethral (mL): 2490 mL  Total OUT: 2490 mL    Total NET: 1345 mL      PHYSICAL EXAM:  Constitutional: Patient is resting comfortably in stretcher, in no acute distress  ENT: PERRL, vertical EOMi  Respiratory: + trach to FVS, breathing non-labored, symmetrical chest wall movement  Cardiovascuar: RRR, no murmurs  Gastrointestinal: +PEG, abdomen soft, non tender  Neurological:  AAOX3 with eye movement, Opens eyes spontaneously, following commands  Motor: RUE shows two fingers, RLE wiggles toes, LUE 05/, LLE withdraws from noxious   Sensation: unable to assess   Pronator Drift: unable to assess   Wounds/Drains: N/A      LABS:                        8.9    6.67  )-----------( 153      ( 17 Dec 2024 13:41 )             28.2     12-    143  |  111[H]  |  24[H]  ----------------------------<  156[H]  3.7   |  22  |  1.21    Ca    8.1[L]      17 Dec 2024 13:41  Phos  2.8     12-  Mg     1.8     12-17        Urinalysis Basic - ( 17 Dec 2024 13:41 )    Color: x / Appearance: x / SG: x / pH: x  Gluc: 156 mg/dL / Ketone: x  / Bili: x / Urobili: x   Blood: x / Protein: x / Nitrite: x   Leuk Esterase: x / RBC: x / WBC x   Sq Epi: x / Non Sq Epi: x / Bacteria: x          CAPILLARY BLOOD GLUCOSE      POCT Blood Glucose.: 95 mg/dL (17 Dec 2024 22:08)  POCT Blood Glucose.: 129 mg/dL (17 Dec 2024 17:22)  POCT Blood Glucose.: 157 mg/dL (17 Dec 2024 13:44)      Drug Levels: [] N/A  Vancomycin Level, Trough: 37.3 ug/mL (12-16 @ 16:23)    CSF Analysis: [] N/A      Allergies    Allergy Status Unknown    Intolerances      MEDICATIONS:  Antibiotics:  piperacillin/tazobactam IVPB.. 3.375 Gram(s) IV Intermittent every 8 hours    Neuro:  acetaminophen   Oral Liquid .. 650 milliGRAM(s) Oral every 6 hours PRN    Anticoagulation:  heparin   Injectable 5000 Unit(s) SubCutaneous every 8 hours    OTHER:  acetylcysteine 10%  Inhalation 4 milliLiter(s) Inhalation every 6 hours  albuterol/ipratropium for Nebulization 3 milliLiter(s) Nebulizer every 6 hours  amLODIPine   Tablet 5 milliGRAM(s) Oral daily  artificial tears (preservative free) Ophthalmic Solution 1 Drop(s) Right EYE every 4 hours  chlorhexidine 0.12% Liquid 15 milliLiter(s) Oral Mucosa every 12 hours  chlorhexidine 2% Cloths 1 Application(s) Topical <User Schedule>  doxazosin 8 milliGRAM(s) Oral at bedtime  erythromycin   Ointment 1 Application(s) Right EYE at bedtime  fluticasone propionate 50 MICROgram(s)/spray Nasal Spray 1 Spray(s) Both Nostrils two times a day  insulin lispro (ADMELOG) corrective regimen sliding scale   SubCutaneous Before meals and at bedtime  ofloxacin 0.3% Solution 1 Drop(s) Right EYE four times a day  pantoprazole  Injectable 40 milliGRAM(s) IV Push daily  petrolatum Ophthalmic Ointment 1 Application(s) Both EYES two times a day  sodium chloride 0.65% Nasal 1 Spray(s) Both Nostrils two times a day    IVF:  sodium chloride 0.9%. 1000 milliLiter(s) IV Continuous <Continuous>    CULTURES:  Culture Results:   Moderate Stenotrophomonas maltophilia  Commensal iram consistent with body site (12-15 @ 05:06)  Culture Results:   No growth at 48 Hours (12-15 @ 01:03)    ASSESSMENT:  43 y/o PMH ?stroke/MI present to [facility] after collapsing at work. Decorticate posturing, vomiting, intubated for airway protection. 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 Transferred to [facility] for [facility] management. s/p trach 10/14. s/p peg 10/21. Re-upgrade to ICU 2/2 desaturation event and suctioning requirements 11/15.     Neuro:  - neuro checks q4/vitals q1h  - pain control: tylenol prn  - vEEG (10/3-)- negative, (10/17-10/19) - negative, (-   - CTH : enlarged pontine hemorrhage, CTH 10/3: stable, CTH 10/25: mostly resolved pontine hemorrhage, CTH 12/15: R mastoid air cell opacification; acute otitis media vs sterile effusion  - MRI brain 10/12: parenchymal hemorrhage, acute/subacute R cerebellar stroke    - stroke core measures, stroke neuro signed off    CV:  - -160  - HTN: amlodipine 5mg, hold lisinopril 5mg   - echo () EF 75%, repeat : 57%  - trend troponins     Resp:  - trach to vent, FVS 40/400/14/6, CPAP as tolerated  - CTA PE protocol 12/15 negative   - Secretions: duonebs/mucomyst/chest PT q8h     GI:  - Trickle Feeds via PEG (placed 10/21 by gen surg)  - bowel regimen held for loose stool, last BM     Renal:  - NS @ 75 while advancing feeds  - Urinary retenion: Gabriel replaced 12/15  - CKD: trend BUN/Cr  - renal US 10/1: echogenicity c/w chronic med renal dz, repeat 10/8: inc renal echogeicity, c/f medical renal disease w increased hydro     Endo:  - A1c 5.4    Heme:  - DVT ppx: SCDs, SQH 5000u q8h   - LE dopplers negative     ID:  - empiric PNA: zosyn (12/15-), s/p vanc (12/15-)  - f/u blood and sputum cultures 12/15  - last pancx , MRSA swab neg    - s/p Stenotrophomonas maltophilia PNA: s/p Bactrim (-) s/p Cefepime (-)  - 11/3, (+) sputum for stenotrophomas maltophlia, blood cx (+) klebsiella, cefepime 2gq12 ( - )   - empiric tx: s/p zosyn (11/3-) , s/p vanc  (11/3-)  - S/p Ancef (10/4-10/14) for PNA, and s/p Unasyn (10/18-10/23) +actinobacter baumanii     MISC:  - ophtho consult for keratitis  - erythromycin ointment R eye q4hrs, ofloxacin ointment R eye QID, artificial tears R eye q2hrs, moisture chamber at bedtime    Dispo: ICU status, full code, pending placement and guardianship hearing     D/w Dr. D'Amico and Dr. Tomlin

## 2024-12-19 LAB
ANION GAP SERPL CALC-SCNC: 12 MMOL/L — SIGNIFICANT CHANGE UP (ref 5–17)
BUN SERPL-MCNC: 18 MG/DL — SIGNIFICANT CHANGE UP (ref 7–23)
CALCIUM SERPL-MCNC: 9 MG/DL — SIGNIFICANT CHANGE UP (ref 8.4–10.5)
CHLORIDE SERPL-SCNC: 107 MMOL/L — SIGNIFICANT CHANGE UP (ref 96–108)
CO2 SERPL-SCNC: 24 MMOL/L — SIGNIFICANT CHANGE UP (ref 22–31)
CREAT SERPL-MCNC: 1.14 MG/DL — SIGNIFICANT CHANGE UP (ref 0.5–1.3)
EGFR: 80 ML/MIN/1.73M2 — SIGNIFICANT CHANGE UP
EGFR: 80 ML/MIN/1.73M2 — SIGNIFICANT CHANGE UP
GLUCOSE SERPL-MCNC: 113 MG/DL — HIGH (ref 70–99)
HCT VFR BLD CALC: 31.8 % — LOW (ref 39–50)
HGB BLD-MCNC: 10.1 G/DL — LOW (ref 13–17)
MAGNESIUM SERPL-MCNC: 2.1 MG/DL — SIGNIFICANT CHANGE UP (ref 1.6–2.6)
MCHC RBC-ENTMCNC: 29.4 PG — SIGNIFICANT CHANGE UP (ref 27–34)
MCHC RBC-ENTMCNC: 31.8 G/DL — LOW (ref 32–36)
MCV RBC AUTO: 92.4 FL — SIGNIFICANT CHANGE UP (ref 80–100)
NRBC # BLD: 0 /100 WBCS — SIGNIFICANT CHANGE UP (ref 0–0)
NRBC BLD-RTO: 0 /100 WBCS — SIGNIFICANT CHANGE UP (ref 0–0)
PHOSPHATE SERPL-MCNC: 3 MG/DL — SIGNIFICANT CHANGE UP (ref 2.5–4.5)
PLATELET # BLD AUTO: 194 K/UL — SIGNIFICANT CHANGE UP (ref 150–400)
POTASSIUM SERPL-MCNC: 3.4 MMOL/L — LOW (ref 3.5–5.3)
POTASSIUM SERPL-SCNC: 3.4 MMOL/L — LOW (ref 3.5–5.3)
RBC # BLD: 3.44 M/UL — LOW (ref 4.2–5.8)
RBC # FLD: 14.7 % — HIGH (ref 10.3–14.5)
SODIUM SERPL-SCNC: 143 MMOL/L — SIGNIFICANT CHANGE UP (ref 135–145)
WBC # BLD: 4.53 K/UL — SIGNIFICANT CHANGE UP (ref 3.8–10.5)
WBC # FLD AUTO: 4.53 K/UL — SIGNIFICANT CHANGE UP (ref 3.8–10.5)

## 2024-12-19 PROCEDURE — 99291 CRITICAL CARE FIRST HOUR: CPT

## 2024-12-19 RX ADMIN — IPRATROPIUM BROMIDE AND ALBUTEROL SULFATE 3 MILLILITER(S): .5; 2.5 SOLUTION RESPIRATORY (INHALATION) at 05:19

## 2024-12-19 RX ADMIN — FLUTICASONE PROPIONATE 1 SPRAY(S): 50 SPRAY, METERED NASAL at 06:54

## 2024-12-19 RX ADMIN — Medication 1 APPLICATION(S): at 18:02

## 2024-12-19 RX ADMIN — ACETYLCYSTEINE 4 MILLILITER(S): 200 INHALANT RESPIRATORY (INHALATION) at 15:01

## 2024-12-19 RX ADMIN — Medication 15 MILLILITER(S): at 17:54

## 2024-12-19 RX ADMIN — OFLOXACIN 1 DROP(S): 3 SOLUTION OPHTHALMIC at 23:21

## 2024-12-19 RX ADMIN — Medication 1 DROP(S): at 11:01

## 2024-12-19 RX ADMIN — Medication 40 MILLIEQUIVALENT(S): at 10:19

## 2024-12-19 RX ADMIN — Medication 1 SPRAY(S): at 06:57

## 2024-12-19 RX ADMIN — ERYTHROMYCIN 1 APPLICATION(S): 5 OINTMENT OPHTHALMIC at 22:43

## 2024-12-19 RX ADMIN — Medication 25 GRAM(S): at 22:42

## 2024-12-19 RX ADMIN — Medication 40 MILLIGRAM(S): at 11:00

## 2024-12-19 RX ADMIN — ACETYLCYSTEINE 4 MILLILITER(S): 200 INHALANT RESPIRATORY (INHALATION) at 05:19

## 2024-12-19 RX ADMIN — IPRATROPIUM BROMIDE AND ALBUTEROL SULFATE 3 MILLILITER(S): .5; 2.5 SOLUTION RESPIRATORY (INHALATION) at 15:01

## 2024-12-19 RX ADMIN — Medication 1 DROP(S): at 23:21

## 2024-12-19 RX ADMIN — Medication 40 MILLIEQUIVALENT(S): at 13:13

## 2024-12-19 RX ADMIN — DOXAZOSIN MESYLATE 8 MILLIGRAM(S): 8 TABLET ORAL at 22:42

## 2024-12-19 RX ADMIN — AMLODIPINE BESYLATE 5 MILLIGRAM(S): 10 TABLET ORAL at 06:54

## 2024-12-19 RX ADMIN — OFLOXACIN 1 DROP(S): 3 SOLUTION OPHTHALMIC at 06:56

## 2024-12-19 RX ADMIN — Medication 1 DROP(S): at 00:25

## 2024-12-19 RX ADMIN — LEVETIRACETAM 1000 MILLIGRAM(S): 10 INJECTION, SOLUTION INTRAVENOUS at 11:00

## 2024-12-19 RX ADMIN — Medication 1 APPLICATION(S): at 07:20

## 2024-12-19 RX ADMIN — Medication 1 DROP(S): at 15:01

## 2024-12-19 RX ADMIN — ACETYLCYSTEINE 4 MILLILITER(S): 200 INHALANT RESPIRATORY (INHALATION) at 09:19

## 2024-12-19 RX ADMIN — Medication 15 MILLILITER(S): at 06:54

## 2024-12-19 RX ADMIN — FLUTICASONE PROPIONATE 1 SPRAY(S): 50 SPRAY, METERED NASAL at 17:54

## 2024-12-19 RX ADMIN — HEPARIN SODIUM 5000 UNIT(S): 1000 INJECTION INTRAVENOUS; SUBCUTANEOUS at 13:15

## 2024-12-19 RX ADMIN — HEPARIN SODIUM 5000 UNIT(S): 1000 INJECTION INTRAVENOUS; SUBCUTANEOUS at 22:42

## 2024-12-19 RX ADMIN — IPRATROPIUM BROMIDE AND ALBUTEROL SULFATE 3 MILLILITER(S): .5; 2.5 SOLUTION RESPIRATORY (INHALATION) at 09:19

## 2024-12-19 RX ADMIN — Medication 1 SPRAY(S): at 17:54

## 2024-12-19 RX ADMIN — IPRATROPIUM BROMIDE AND ALBUTEROL SULFATE 3 MILLILITER(S): .5; 2.5 SOLUTION RESPIRATORY (INHALATION) at 21:10

## 2024-12-19 RX ADMIN — Medication 25 GRAM(S): at 13:13

## 2024-12-19 RX ADMIN — HEPARIN SODIUM 5000 UNIT(S): 1000 INJECTION INTRAVENOUS; SUBCUTANEOUS at 06:55

## 2024-12-19 RX ADMIN — Medication 1 DROP(S): at 07:20

## 2024-12-19 RX ADMIN — Medication 25 GRAM(S): at 06:55

## 2024-12-19 RX ADMIN — Medication 1 DROP(S): at 04:11

## 2024-12-19 RX ADMIN — ACETYLCYSTEINE 4 MILLILITER(S): 200 INHALANT RESPIRATORY (INHALATION) at 21:10

## 2024-12-19 RX ADMIN — OFLOXACIN 1 DROP(S): 3 SOLUTION OPHTHALMIC at 11:02

## 2024-12-19 RX ADMIN — OFLOXACIN 1 DROP(S): 3 SOLUTION OPHTHALMIC at 17:54

## 2024-12-19 RX ADMIN — OFLOXACIN 1 DROP(S): 3 SOLUTION OPHTHALMIC at 00:25

## 2024-12-19 RX ADMIN — Medication 1 APPLICATION(S): at 06:54

## 2024-12-19 RX ADMIN — LEVETIRACETAM 1000 MILLIGRAM(S): 10 INJECTION, SOLUTION INTRAVENOUS at 00:25

## 2024-12-19 RX ADMIN — Medication 1 DROP(S): at 19:01

## 2024-12-19 NOTE — PROGRESS NOTE ADULT - SUBJECTIVE AND OBJECTIVE BOX
S/Overnight events:    GEORGE overnight, unable to elicit any complaints at this time.     Hospital Course:   : transferred from [facility]. A line placed. Versed dc'd. Cy Rader at bedside, states pt has family in [place], cannot confirm medications or PMH other than stroke and MI. 250cc bolus 3% given. LR switched to NS. hydralazine 25q8 started, 3% started, switched propofol to precedex   10/1: stability CTH done. Added labetalol, started TF. Palliative consulted. ethics consulted to determine surrogate. febrile 103, pan cx sent  10/2: BD 2, GEORGE overnight. TF resumed. Desatt'd to 80s, FiO2 inc. to 50. Fentanyl given, ABG, CXR ordered. Maxxed on precedex, started on propofol for DARIEN -4 - -5. Precedex dc'd. Duonebs, mucomyst, hypertonic added. 3% dc'd. Cardene dc'd. Start vanc/CTX. Increased labetalol 200q8. MRSA negative, dc'd vanc. ETT pulled back 2cm x 2, good positioning after confirmatory chest xray. Ethics attempting to establish HCP with family. Na 159, starting FW 250q6 for range 150-155.   10/3: BD3, GEORGE o/n, neuro stable. Na elevating, FW increased to 300q6. Dc'd bowel reg for diarrhea. vEEG started. SQH 5000q8 tonight.   10/4: BD 4, albumn bolus, incr. LR to 80 2/2 incr. in Cr, LR to 100cc/hr for uptrending Cr. Started 7% hypersal for 48hrs and SL atropine for inline/oral thick secretions. Dc'd CTX and started ancef for MSSA in the sputum. Nephrology consulted for CKD, f/u recs. SBP 170s, given hydralazine 10mg IVP.   10/5: BD5, o/n 10mg IVP hydralazine given for SBP 170s and started on hydralazine 25q8 via OGT. 10mg IV push labetalol for SBP > 160s. RT placed for diarrhea.   10/6: BD6, o/n FW increased to 350q4 per nephrology recs. IV tylenol for temp 100.6, SBp 160s presumed uncomfortable.   10/7: BD7, overnight pancultured for temp 101.8F.   10/8: BD8. GEORGE. Cr bumped. decreased LR to 75cc/hr. Adding simethicone ATC. incr hydralazine 50mgTID. Incr labetalol 300mgTID. Na 145, decreased FWF to 250q6. Start precedex. FENa consistent with intrinsic kidney injury. Pend repeat renal US. Retaining up to 1.3L, bladder scans q6, straight cath PRN  10/9: BD 9. GEORGE overnight. Neuro stable. abd xray for distention w non-specific gas pattern, OGT to LIWS for morning. duonebs/mucomyst to q8 for improving secretions. Changed tube feeds to Jevity 1.5 20cc/hr, low rate due to abdominal distention, nepro dense and more difficult to digest. Tolerating CPAP, confirmed by ABG.   10/10: BD 10. GEORGE overnight. Neuro stable. (+) gabriel for urinary retention on bladder scan. inc TF to goal rate of 40cc/hr. family leaning toward pursuing trach/PEG. 1/2 amp for FS 81.   10/11: BD 11. GEORGE overnight. Neuro stable. Trach/PEG consults placed.   10/12: BD 12. GEORGE overnight. Neuro stable. MRI brain complete.   10/13: BD 13. Increase flomax. Hold SQH after PM dose for trach tm. IVL.   10/14: BD 14. GEORGE overnight, remains on AC/VC. Gabirel placed for urinary retention. Dc'd free water.  S/p trach with pulm. NGT placed and CXR confirmed in good position.   10/15: BD 15, GEORGE ovn. resumed feeds. spiked 101, pan cx sent.   10/16: BD 16. GEORGE ovn. Lokelma 5mg for K+ 5.4. Started vanc q 24/zosyn for empiric PNA coverage, IVF to 100/hr. PEG held for fever.   10/17: BD 17,  ordered serum osm and urine osm for am. Started sinemet for neurostimulation. Increased cardura to 0.8. Started FW 100q4, dc'd IVF. MRSA negative, dc'd vanc. NGT replaced d/t coiling.   10/18: BD 18, GEORGE overnight, neuro stable. Amantadine added for neurostim. zosyn changed to unasyn for acinetobacter baumannii, failed TOV and required SC  10/19: BD 19, GEORGE ovn. cardura 2mg added for retention. labetalol decreased 200q8, hydralazine decreased 25q8. Gabriel replaced.   10/20: BD20, GEORGE overnight. NGT dislodged, replaced. PEG tomorrow w/ gen surg, FW increased to 150q4 and labetalol decreased to 100q8, lokelma given for hyperkalemia.   10/21: BD 21. POD0 PEG placement with Gen surg. decr labetolol to 50q8, incr. cardura to 0.4, started lokelma and phoslo, dc gabriel POD0 PEG placement with Gen surg.  10/22: BD 22. Plan to start TF today via PEG. dc labetalol, Following ophtho recs. Increased apnea settings - found to be in cheyne-moe respiration. CPAP .  10/23: BD 23. hydralazine d/c'd, trach collar trial today. Rectal tube placed at 6am.  10/24: BD24, o/n lokelma held due to diarrhea. Free water 100q6 resumed. dc'd tamsulosin, amantadine. Incr'd cardura to 8mg qhs. Dc'd FW. Switched jevity to nepro. gabriel placed for high urine output. Started SL atropine for oral secretions. Dc'd free water.  10/25: BD25, o/n decreased suctioning requirements to > q4hrs, GEORGE. Cr improving, cont phoslo, lokelma held at this time. Gabriel placed yest, cont. Tolerating trach collar. Given 500cc plasmalyte bolus for ANIKA. Dc'd sinemet.   10/26: BD26, o/n resumed lokelma 5mg daily and resumed 100cc free water q6hrs. Change in neuro status with new right pupillary dilation with anisocoria (right pupil 6mm fixed and left pupil 3mm briskly reactive). Given 23.4% NaCl bullet, taken for emergent CTH showing mostly resolved pontine hemorrhage, continued brainstem hypodensity likely edema d/t hemorrhage, no new hemorrhage or infarct, no herniation, mild increase in size of left lateral ventricle. Vitals remaine stable. Na goal > 140.   10/27: BD27, o/n GEORGE.Neuro stable. Pend stepdown with airway bed.   10/28: BD 28. GEORGE overnight. Neuro stable. Miralax ordered. Gabriel removed, pending TOV.  10/29: BD 29. GEORGE o/n. Given 2L NS over 8 hrs for increased BUN/Cr ratio. Gabriel placed for frequent straight cath.   10/30: BD 30.   10/31: BD 31. GEORGE overnight. Na 149, increased free water to 200q6. 1L NS for uptrending BUN.   : BD 32. GEORGE overnight. Given 1L NS for dehydration. Na 146, increased FW to 250q6.   : BD 33 GEORGE overnight, neuro stable, given 500cc bolus for net negative status and tachycardia   11/3: BD 34, GEORGE overnight, neuro stable. Patient remains tachycardic, EKG showing sinus tachycardia, given additional 500cc NS bolus. Febrile to 101.9F, pan cultured (without UA), CXR WNL, given tylenol.   : BD 35, GEORGE overnight, neuro stable. Given 1L NS for tachycardia. sputum (+) for stenotropohomas maltophilia.   : BD 36 GEORGE overnight, neuro stable. Vancomycin dc'd. Chest PT BID. ID consulted, cont zosyn.  : BD 37. blood cx + klebsiella dc zosyn changed to cefepime, CTAP ordered, rpt blood cx sent.    : BD 38. Pending CT A/P, given 250cc bolus and starting maintenance fluids overnight. Pending CT A/P after bolus   : BD 39. CT CAP negative for infection.   : BD 40. GEORGE overnight.  11/10: BD 41. GEORGE overnight. desat to 85 on trach collar, O2 inc to 10L and 100%, O2 sat inc to 95. pt tachy to 110s, euvolemic. given tylenol. ABG and CXR ordered. spiked fever, pancultured, RVP negative. AM ABG w pO2 79, rpt w pO2 79. pt appears comfortable, satting 94%.   : BD 42. GEORGE overnight. pt became tachy to 130s, desat to 90 on 100% FiO2 and 10L. suctioned, (+) productive cough. temp 101.4, given 1g IV tylenol and 500cc NS bolus for euvolemia. fever and HR downtrending. LE dopplers negative for dvt  : BD 43, GEORGE ovn, fever and HR downtrending, satting 97% 70% FIO2  : BD 44, GEORGE ovn. started standing tylenol x24 hours for tachycardia. desat to 80s, o2 increased. CXR stable, pending CTA PE protocol.   : BD 45, GEORGE overnight, neuro stable. resp therapy dec FiO2 to 70%.   11/15: BD 46, GEORGE overnight, neuro stable.  Rapid called for desaturation 30s, tachycardic 140s. Patient bagged, 100% fio2, heavily suctioned. CXR/POCUS unremarkable. ABG c/w desaturation. WBC 14.71. Afebrile. O2 improved to 90s and patient upgraded to ICU. ABG paO2 30s improved to 89 on vent. IV Tylenol x 1, sputum sent. Start protonix while o-n vent.   : BD 47. POCUS showed collapsable IVCF, given 1L bolus. Vanco/Cefpime added empriic for PNA, NGT feeds restarted. MRSA swab neg, Vanc DC'd.   : BD 48. GEORGE overnight. 1l bolus for tachycardia. Spiked to 101, cultured. 500cc bolus for tachycardia, tachy to 148 given 25mcg fentanyl, 250cc albumin, 1.5L bolus. 5 IV lopressor with response HR to 100s. +Stenotrophomonas on sputum cx.   : BD 49. GEORGE overnight. Consulted ID, cefepime switched to bactrim x7days. Started hydrochlorothiazine 12.5mg daily.  : BD 50. Tachy 120s, given tylenol and 500cc NS. Tolerating 5/5, switched to TCx. Holding phos binder. D/c Bactrim. D/c nery, f/u TOV. Dc'd PPI.   : BD 51. GEORGE ovn. 1600 satting low 90s, mildly tachy to 110s, afebrile, RR wnl. O2 improved to mid 90s while inc O2 to 100% on TCx. CPAP 5/5 placed back on.  : BD 52, GEORGE ovn, tolerating CPAP 5/5. Switch to trach collar during the day if tolerating well. HCTZ held for Cr bump, straight cath frequence increased to q4  : BD 53, GEORGE ovn. Resumed phoslo. Gabriel placed. Resumed HCTZ.   : BD 54. Holding tylenol in setting of possible fever, will require pan cx if febrile. Cr improved today. Cont CPAP. Bowel regimen held i/s/o diarrhea. FOBT negative.  : BD 55. GEORGE overnight. Neuro stable. HCTZ dc'ed, started lisinopril 5. Lokelma dc'ed for K 3.7.   : BD 56, GEORGE overnight. Neuro stable. dc'd lisinopril 5mg. Gabriel dc'd. TOV. 1545 noted to be hypotensive, MAP 50, in supine position on chair, HR 60s, afebrile, O2 96%. Given 1L cc NS bolus, placed back on bed in reverse trendelenberg, improved to map of 66. Neostick at bedside. Vitals check q1h. Dc'ed amlodipine. Failed TOV, bladder scan q6, sc prn. Added back Senna.   : BD 57, GEORGE overnight. Neuro stable. Dc'd phoslo.   : BD 58, GEORGE overnight. Neuro stable.    : BD 59. Gabriel replaced.   : GEORGE.  : GEORGE, neuro stable.   : BD 62, GEORGE overnight  : BD 63, GEORGE overnight.; Given 1L bolus of LR for uptrending BUN/Cr.  12/3: BD 64. Reinstated eye gtt/moisture chamber given increased Rt eye injection  : BD 65. GEORGE overnight. Attempted to speak with ophtho regarding eyelid weight/closure but no answer, full mailbox.   : BD 66, GEORGE overnight, bowel regimen increased and had BM.   : BD 67, GEORGE overnight, neuro stable.  : GEORGE overnight, neuro stable. /110s, given x1 hydralazine 10 mg IVP. Restarted home amlodipine 5mg.  : GEORGE. OOB to chair.     : GEORGE, mucomyst added for thick secretions, simethicone for abd distension, abd xray with stool burden, increased bowel regimen.   : GEORGE overnight.   : GEORGE overnight.   : GEORGE overnight  12/15: o/n Patient became tachycardic HR 120s and 10 minutes later O2 sat dropped as low as 89%. Patient suctioned without improvement in O2 sat and tube feeds found in suction catheter. TFs held.  STAT CXR ordered. STAT labs sent. Respiratory therapist called to bedside and patient trach connected to ventilator. After connection to ventilator and further suctioning O2 sat improved to 97% but patient HR remains 120-130s. Upgraded to NSICU for further management. Vancomycin and zosyn started. CTA  chest PE protocol and CTH ordered. Blood cultures sent.given 500cc bolus, rpt ABG sent pO2 243, CTH and CTA chest done. FS while NPO, FiO2 dec 50 pending ABG. sputum cx positive for few GPC and GNR.   : GEORGE overnight, restarted amlodipine, troponin 75   : GEORGE overnight, neuro stable. Attempted CPAP this morning, did not tolerate and back on full vent support. Dc'd Vanc. Tachycardia to 140s, noted extremities to be twitching along with jaw twitching. Given 2g of Keppra, total 4mg ativan and placed on EEG, full set of labs and lactate negative. Resumed trickle feeds at 20cc/hr. Given 250cc albumin for tachycardia.   : GEORGE overnight. Neuro stable. CTH stable. EEG negative and dc'd.   : GEORGE overnight. neuro stable.       Vital Signs Last 24 Hrs  T(C): 36.7 (19 Dec 2024 00:57), Max: 36.9 (18 Dec 2024 17:59)  T(F): 98.1 (19 Dec 2024 00:57), Max: 98.5 (18 Dec 2024 17:59)  HR: 75 (19 Dec 2024 03:00) (72 - 103)  BP: 127/90 (19 Dec 2024 03:00) (127/90 - 168/113)  BP(mean): 104 (19 Dec 2024 03:00) (104 - 135)  RR: 14 (19 Dec 2024 03:00) (14 - 19)  SpO2: 100% (19 Dec 2024 03:00) (98% - 100%)    Parameters below as of 19 Dec 2024 03:00  Patient On (Oxygen Delivery Method): ventilator    O2 Concentration (%): 40    I&O's Detail    17 Dec 2024 07:01  -  18 Dec 2024 07:00  --------------------------------------------------------  IN:    Enteral Tube Flush: 260 mL    IV PiggyBack: 300 mL    IV PiggyBack: 350 mL    IV PiggyBack: 350 mL    Miscellaneous Tube Feedin mL    sodium chloride 0.9%: 475 mL    sodium chloride 0.9%: 1200 mL    Sodium Chloride 0.9% Bolus: 1000 mL  Total IN: 4315 mL    OUT:    Indwelling Catheter - Urethral (mL): 3270 mL  Total OUT: 3270 mL    Total NET: 1045 mL      18 Dec 2024 07:01  -  19 Dec 2024 03:37  --------------------------------------------------------  IN:    Enteral Tube Flush: 60 mL    IV PiggyBack: 150 mL    Miscellaneous Tube Feedin mL    sodium chloride 0.9%: 75 mL  Total IN: 1005 mL    OUT:    Indwelling Catheter - Urethral (mL): 2690 mL  Total OUT: 2690 mL    Total NET: -1685 mL        I&O's Summary    17 Dec 2024 07:01  -  18 Dec 2024 07:00  --------------------------------------------------------  IN: 4315 mL / OUT: 3270 mL / NET: 1045 mL    18 Dec 2024 07:01  -  19 Dec 2024 03:37  --------------------------------------------------------  IN: 1005 mL / OUT: 2690 mL / NET: -1685 mL        PHYSICAL EXAM:  General: NAD, pt is sitting up in bed, on AC/VC 40/400/14/6  HEENT: (+) eeg in place; PERRL 3mm briskly reactive, EOM b/l vertically tracking, tongue midline, neck FROM  CV: RRR, S1, S2  Resp: breathing non-labored on AC/VC, chest rise symmetric  GI: abd soft, NTND  Neuro: AOx3 w eyebrow movement, intermittently follows commands on right side.   RUE shows 2 fingers to command, RLE wiggles toes to command. LUE 0/5, LLE w/d to noxious.   Unable to assess sensation 2/2 mental status   Extremities: distal pulses 2+ x4.  Wound/incision: n/a  Drain: (+) PEG (+) gabriel (+) trach    TUBES/LINES:  [] CVC  [] A-line  [] Lumbar Drain  [] Ventriculostomy  [x] Gabriel  [x] Other- PEG, trach    DIET:  [] NPO  [] Mechanical  [x] Tube feeds    LABS:                        8.6    5.96  )-----------( 185      ( 18 Dec 2024 05:30 )             27.5     12-18    140  |  109[H]  |  20  ----------------------------<  89  3.9   |  22  |  1.06    Ca    8.3[L]      18 Dec 2024 05:30  Phos  4.2     12-18  Mg     2.1     12-18        Urinalysis Basic - ( 18 Dec 2024 05:30 )    Color: x / Appearance: x / SG: x / pH: x  Gluc: 89 mg/dL / Ketone: x  / Bili: x / Urobili: x   Blood: x / Protein: x / Nitrite: x   Leuk Esterase: x / RBC: x / WBC x   Sq Epi: x / Non Sq Epi: x / Bacteria: x          CAPILLARY BLOOD GLUCOSE      POCT Blood Glucose.: 97 mg/dL (18 Dec 2024 21:31)  POCT Blood Glucose.: 92 mg/dL (18 Dec 2024 17:59)  POCT Blood Glucose.: 87 mg/dL (18 Dec 2024 11:29)  POCT Blood Glucose.: 95 mg/dL (18 Dec 2024 05:46)      Drug Levels: [] N/A  Vancomycin Level, Trough: 37.3 ug/mL ( @ 16:23)    CSF Analysis: [] N/A      Allergies    Allergy Status Unknown    Intolerances      MEDICATIONS:  Antibiotics:  piperacillin/tazobactam IVPB.. 3.375 Gram(s) IV Intermittent every 8 hours    Neuro:  acetaminophen   Oral Liquid .. 650 milliGRAM(s) Oral every 6 hours PRN  levETIRAcetam  Solution 1000 milliGRAM(s) Oral every 12 hours    Anticoagulation:  heparin   Injectable 5000 Unit(s) SubCutaneous every 8 hours    OTHER:  acetylcysteine 10%  Inhalation 4 milliLiter(s) Inhalation every 6 hours  albuterol/ipratropium for Nebulization 3 milliLiter(s) Nebulizer every 6 hours  amLODIPine   Tablet 5 milliGRAM(s) Oral daily  artificial tears (preservative free) Ophthalmic Solution 1 Drop(s) Right EYE every 4 hours  chlorhexidine 0.12% Liquid 15 milliLiter(s) Oral Mucosa every 12 hours  chlorhexidine 2% Cloths 1 Application(s) Topical <User Schedule>  doxazosin 8 milliGRAM(s) Oral at bedtime  erythromycin   Ointment 1 Application(s) Right EYE at bedtime  fluticasone propionate 50 MICROgram(s)/spray Nasal Spray 1 Spray(s) Both Nostrils two times a day  ofloxacin 0.3% Solution 1 Drop(s) Right EYE four times a day  pantoprazole  Injectable 40 milliGRAM(s) IV Push daily  petrolatum Ophthalmic Ointment 1 Application(s) Both EYES two times a day  sodium chloride 0.65% Nasal 1 Spray(s) Both Nostrils two times a day    IVF:    CULTURES:  Culture Results:   Moderate Stenotrophomonas maltophilia  Commensal iram consistent with body site (12-15 @ 05:06)  Culture Results:   No growth at 72 Hours (12-15 @ 01:03)    RADIOLOGY & ADDITIONAL TESTS:      ASSESSMENT:  43 y/o PMH ?stroke/MI present to [facility] after collapsing at work. Decorticate posturing, vomiting, intubated for airway protection. 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 Transferred to [facility] for further management. s/p trach 10/14. s/p peg 10/21. Re-upgrade to ICU 2/2 desaturation event and suctioning requirements 11/15.     AMS    Handoff  MEWS Score  Acute myocardial infarction  CVA (cerebral vascular accident)  Intracerebral hemorrhage of brain stem  Brainstem stroke  Brain stem stroke syndrome  Brain stem hemorrhage  Brain stem stroke syndrome  Hemorrhagic stroke  Brainstem stroke  Encephalopathy acuteFunctional quadriplegia  Advanced care planning/counseling discussion  Encounter for palliative care  Pontine hemorrhage  Neurogenic dysphagia  Chronic respiratory failure  Acute kidney injury superimposed on CKD  Acute urinary retention  Hypertensive emergency  Sepsis, unspecified organism  Sepsis  Gram-negative bacteremia  Percutaneous tracheal puncture  Altered mental status examination  EGD, with PEG  AMS  [email or web address]        PLAN:  Neuro:  - neuro checks q4/vitals q1h  - pain control: tylenol prn  - seizure tx: keppra 1000mg BID   - vEEG (10/3-)- negative, (10/17-10/19) - negative, (-) negative  - CTH : enlarged pontine hemorrhage, CTH 10/3: stable, CTH 10/25: mostly resolved pontine hemorrhage, CTH 12/15: R mastoid air cell opacification; acute otitis media vs sterile effusion, CTH : stable.  - MRI brain 10/12: parenchymal hemorrhage, acute/subacute R cerebellar stroke    - stroke core measures, stroke neuro signed off    CV:  - -160  - HTN: amlodipine 5mg, hold lisinopril 5mg   - echo () EF 75%, repeat : 57%  - troponins downtrending    Resp:  - trach to vent, FVS 40/400/14/6, CPAP as tolerated  - CTA PE protocol 12/15 negative   - Secretions: duonebs/mucomyst/chest PT q8h     GI:  - TF via PEG (placed 10/21 by gen surg)  - bowel regimen held for loose stool, last BM     Renal:  - IVL  - Urinary retenion: Gabriel replaced 12/15  - CKD: trend BUN/Cr  - renal US 10/1: echogenicity c/w chronic med renal dz, repeat 10/8: inc renal echogeicity, c/f medical renal disease w increased hydro     Endo:  - A1c 5.4    Heme:  - DVT ppx: SCDs, SQH 5000u q8h   - LE dopplers negative     ID:  - empiric PNA: zosyn (12/15-), s/p vanc (12/15-)  - blood and sputum cultures 12/15  - last pancx , MRSA swab neg    - s/p Stenotrophomonas maltophilia PNA: s/p Bactrim (-) s/p Cefepime (-)  - 11/3, (+) sputum for stenotrophomas maltophlia, blood cx (+) klebsiella, cefepime 2gq12 ( - )   - empiric tx: s/p zosyn (11/3-) , s/p vanc  (11/3-)  - S/p Ancef (10/4-10/14) for PNA, and s/p Unasyn (10/18-10/23) +actinobacter baumanii     MISC:  - ophtho consult for keratitis  - erythromycin ointment R eye q4hrs, ofloxacin ointment R eye QID, artificial tears R eye q2hrs, moisture chamber at bedtime    Dispo: ICU status, full code, pending placement and guardianship hearing     D/w Dr. D'Amico and Dr. Reveles  Assessment:  Present when checked    []  GCS  E   V  M     Heart Failure: []Acute, [] acute on chronic , []chronic  Heart Failure:  [] Diastolic (HFpEF), [] Systolic (HFrEF), []Combined (HFpEF and HFrEF), [] RHF, [] Pulm HTN, [] Other    [] ANIKA, [] ATN, [] AIN, [] other  [] CKD1, [] CKD2, [] CKD 3, [] CKD 4, [] CKD 5, []ESRD    Encephalopathy: [] Metabolic, [] Hepatic, [] toxic, [] Neurological, [] Other    Abnormal Nurtitional Status: [] malnurtition (see nutrition note), [ ]underweight: BMI < 19, [] morbid obesity: BMI >40, [] Cachexia    [] Sepsis  [] hypovolemic shock,[] cardiogenic shock, [] hemorrhagic shock, [] neuogenic shock  [] Acute Respiratory Failure  []Cerebral edema, [] Brain compression/ herniation,   [] Functional quadriplegia  [] Acute blood loss anemia

## 2024-12-19 NOTE — PROGRESS NOTE ADULT - CRITICAL CARE SERVICES PROVIDED
Patient is critically ill, requiring critical care services.

## 2024-12-19 NOTE — PROGRESS NOTE ADULT - SUBJECTIVE AND OBJECTIVE BOX
=================================  NEUROCRITICAL CARE ATTENDING NOTE  =================================    GARETT DELEON   MRN-[id number]  Summary:  44y/M  unknown past medical history (reported stroke and MI by coworkers) presented to [facility] with AMS, Pt was working at [employer] when he was found down by coworkers. EMS called and pt brought to [facility] ED. Intubated, sedated, started on cardene for SBPs in 200s. CT head showed brain stem bleed. Transferred to NSICU for further management.  (30 Sep 2024 12:55)    COURSE IN THE HOSPITAL:  Date	POD	BD	Events	Surgery  	-	-	Transferred from [facility]. A-line placed. Versed discontinued. Hydralazine, 3% saline started. Changed propofol to precedex.	-  10/1	-	-	Stability CTH done. Added labetalol, started TF. Palliative and ethics consulted. Febrile 103°F, pan cultures sent.	-  10/2	-		Desaturation to 80s, FiO2 increased. Fentanyl given, ABG, CXR ordered. Propofol started. Vanc/CTX started. MRSA negative, vanc discontinued. Na 159, starting FW 250q6.	-  10/3	-	3	Na increasing, FW increased to 300q6. Bowel regimen discontinued. vEEG started.	-  10/4	-		Albumin bolus, increased LR. 7% hypersal started for 48hrs. Nephrology consulted for CKD. SBP 170s, hydralazine given.	-  10/5	-		Hydralazine and labetalol for SBP. RT placed for diarrhea.	-  10/6	-		FW increased to 350q4. IV Tylenol for temp 100.6°F.	-  10/7	-		Pancultured for temp 101.8°F.	-  10/8	-		Cr increase, LR decreased. Simethicone added. Increased hydralazine, labetalol. Na 145, FW 250q6. Retaining fluid, bladder scans ordered.	-  10/9	-		Abd x-ray for distention. Changed tube feeds to Jevity 1.5. Tolerating CPAP.	-  10/10	-	10	Vuong for urinary retention. Increased TF to goal. Family leaning toward trach/PEG.	-  10/11	-		Trach/PEG consults placed.	-  10/12	-		MRI brain complete.	-  10/13	-	13	Increased flomax. Hold SQH for trach.	-  10/14	-		S/p trach with pulmonary. NGT placed, CXR confirmed position.	-  10/15	-	15	Resumed feeds. Spiked fever 101°F, pan cultures sent.	-  10/16	-	16	Lokelma for K+ 5.4. Started vanc/zosyn for PNA. PEG held for fever.	-  10/17	-	17	Serum and urine osm ordered. Started sinemet, FW 100q4, IVF discontinued. MRSA negative, vanc discontinued. NGT replaced.	-  10/18	-	18	Amantadine added. Changed zosyn to unasyn for acinetobacter baumannii. Failed TOV, required SC.	-  10/19	-	19	Cardura added for retention. Decreased labetalol, hydralazine. Vuong replaced.	-  10/20	-	20	NGT dislodged, replaced. PEG scheduled for tomorrow. Lokelma for hyperkalemia.  10/21	-	21	s/p PEG  10/22	-	22	No significant events overnight. 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  trach collar 40%  10/24   -	24	No significant events overnight.   10/25   -	25	No significant events overnight.   10/26   -	26	Anisocoria overnight, given 23.4% x1, CT stable; briefly back on vent, ABG unremarkable, now back on trach collar; exam - following commands!  10/27   -	27	No significant events overnight.     Date	POD	BD	Events	  10/19		19	Cardura 2mg added for retention. Labetalol decreased. Vuong replaced.	  10/20		20	NGT dislodged, replaced. PEG placement scheduled for tomorrow. Free water increased. Labetalol decreased. Lokelma given for hyperkalemia.	  10/21	0	21	PEG placement with Gen Surg. Labetalol decreased. Cardura increased. Started Lokelma and Phoslo. Vuong discontinued.	PEG placement with Gen Surg  10/22		22	Plan to start tube feeding via PEG. Labetalol discontinued. CPAP 5/ for Cheyne-Vaughn respiration.	  10/23		23	Hydralazine discontinued. Trach collar trial. Rectal tube placed.	  10/24		24	Lokelma held for diarrhea. Cardura increased. Jevity switched to Nepro. Vuong placed for high urine output.	  10/25		25	Change in neuro status: anisocoria. Emergent CTH: resolved pontine hemorrhage, continued edema.	  10/26		26	Lokelma and free water resumed. 23.4% NaCl given. Emergent CTH: resolved pontine hemorrhage, continued edema.	  10/27		27	Neuro stable.	  10/28		28	Neuro stable. Miralax ordered. Vuong removed, pending TOV.	  10/29		29	2L NS over 8 hrs for increased BUN/Cr ratio. Vuong placed.	  10/31		31	Na 149, increased free water to 200q6. 1L NS for uptrending BUN.	  		32	1L NS for dehydration. Na 146, increased FW to 250q6.	  		33	Neuro stable. 500cc bolus for tachycardia.	  11/3		34	Neuro stable. Febrile to 101.9°F, pan cultured. 500cc NS bolus for sinus tachycardia.	  		35	Neuro stable. 1L NS for tachycardia. Sputum (+) for Stenotrophomonas.	  		36	Neuro stable. Vancomycin discontinued. Chest PT BID. ID consulted. Continue Zosyn.	  		37	Blood cx (+) for Klebsiella. Zosyn changed to Cefepime. CTAP ordered.	  		38	Maintenance fluids started. Pending CT A/P.	  		39	CT CAP negative for infection.	  11/10		41	Desat to 85 on trach collar, O2 increased. Sat 94%. Fever, pancultured. AM ABG pO2 79.	  		42	Tachycardia 130s, desat to 90 on 10L. Cough, temp 101.4, given 1g IV Tylenol and 500cc NS. Fever and HR downtrending.	  		43	Fever and HR downtrending, satting 97% on 70% FIO2.	  		44	Standing Tylenol for tachycardia. Desat to 80s, O2 increased. Pending CTA PE protocol.	  		45	Neuro stable. Resp therapy decreased FiO2 to 70%.	  11/15		46	Rapid called for desat 30s, tachycardia 140s. Upgraded to ICU. Vent started. POCUS and CXR unremarkable. ABG improved.	  		47	POCUS showed collapsible IVC, given 1L bolus. Vanco/Cefepime started for PNA. MRSA swab negative, Vanco discontinued.	  		48	1L bolus for tachycardia. Fever 101, cultured. 1.5L bolus, 5 IV Lopressor. Stenotrophomonas on sputum cx.	  		49	Consulted ID, Cefepime switched to Bactrim x7 days. Started Hydrochlorothiazine 12.5mg daily.	  		50	Tachycardia 120s, given Tylenol and 500cc NS. Tolerating 5/5, switched to TCx. Bactrim discontinued. Vuong discontinued.	  		51	Satting low 90s, mildly tachycardic to 110s. O2 improved on TCx. CPAP 5/5 placed back on.	  		52	Tolerating CPAP 5/5. HCTZ held for Cr bump, straight cath increased.	  		53	Resumed Phoslo. Vuong placed. Resumed HCTZ.	  		54	Holding Tylenol for possible fever, pan culture if febrile. Cr improved. Cont. CPAP.	  		55	Neuro stable. HCTZ dc'd, started Lisinopril 5. Lokelma dc'd for K 3.7.	  		56	Neuro stable. hypotension to 60s SBP, given fluid bolus  : BD 57, GEORGE overnight. Neuro stable. Dc'd phoslo.   : BD 58, GEORGE overnight. Neuro stable.    : BD 59. Vuong replaced.   : GEORGE.  : GEORGE, neuro stable.   : BD 62, GEORGE overnight  : BD 63, GEORGE overnight.; Given 1L bolus of LR for uptrending BUN/Cr.  12/3: BD 64. Reinstated eye gtt/moisture chamber given increased Rt eye injection  : BD 65. GEORGE overnight. Attempted to speak with ophtho regarding eyelid weight/closure but no answer, full mailbox.   : BD 66, GEORGE overnight, bowel regimen increased and had BM.   : BD 67, GEORGE overnight, neuro stable.  : GEORGE overnight, neuro stable. /110s, given x1 hydralazine 10 mg IVP. Restarted home amlodipine 5mg.  : GEORGE. OOB to chair.     : GEORGE, mucomyst added for thick secretions, simethicone for abd distension, abd xray with stool burden, increased bowel regimen.   : GEORGE overnight.   : GEORGE overnight.   : GEORGE overnight  12/15: o/n Patient became tachycardic HR 120s and 10 minutes later O2 sat dropped as low as 89%. Patient suctioned without improvement in O2 sat and tube feeds found in suction catheter. TFs held.  STAT CXR ordered. STAT labs sent. Respiratory therapist called to bedside and patient trach connected to ventilator. After connection to ventilator and further suctioning O2 sat improved to 97% but patient HR remains 120-130s. Upgraded to NSICU for further management. Vancomycin and zosyn started. CTA  chest PE protocol and CTH ordered. Blood cultures sent.given 500cc bolus, rpt ABG sent pO2 243, CTH and CTA chest done. FS while NPO, FiO2 dec 50 pending ABG. sputum cx positive for few GPC and GNR.   : GEORGE overnight, restarted amlodipine, troponin 75   : GEORGE overnight, neuro stable, CPAP x2 hours 10/6, apneic back to full vent; OOB to chair, sudden tachycardia 140's, L facial twitching, still following commands, given 2G levetiracetam, ativan 2mg x2, fluid bolus, metoprolol, EEG    full vent support overnight, albumin    Past Medical History: Acute myocardial infarction CVA (cerebral vascular accident)  Allergies:  Allergy Status Unknown  Home meds:     PHYSICAL EXAMINATION  T(C): 36.6 (24 @ 05:18), Max: 36.9 (24 @ 17:59) HR: 78 (24 @ 07:00) (72 - 103) BP: 118/84 (24 @ 07:00) (118/84 - 168/113)  RR: 14 (24 @ 07:00) (14 - 18)SpO2: 100% (24 @ 07:00) (97% - 100%)  NEUROLOGIC EXAMINATION:  Patient awake, alert, Oriented x2-3 with choices, tracks, RANDELL, R UE hand  2/5 RLE wiggles L UE trace withdrawal L LE withdraws  GENERAL: full vent support 40% 400 14 +6  EENT:  anicteric,  CARDIOVASCULAR: (+) S1 S2, normal rate and regular rhythm  PULMONARY: clear to auscultation bilaterally (oral thick secretions)  ABDOMEN: soft, nontender with normoactive bowel sounds  EXTREMITIES: no edema  SKIN: no rash    LABS:   CAPILLARY BLOOD GLUCOSE 97 92 87                10.1   4.53  )-----------( 194      ( 19 Dec 2024 05:30 )             31.8     143  |  107  |  18  ----------------------------<  113[H]  3.4[L]   |  24  |  1.14 (1.06)    Ca    9.0      19 Dec 2024 05:30  Phos  3.0       Mg     2.1      @ 07:01   @ 07:00  --------------------------------------------------------  IN: 1175 mL / OUT: 3100 mL / NET: -1925 mL     @ 07:01   @ 07:48  --------------------------------------------------------  IN: 25 mL / OUT: 0 mL / NET: 25 mL        Bacteriology:  10/16 sputum culture acinetobacter  10/15 Blood CS NG5D    12/15 Sputum CS commensal iram  12/15 blood CS NG4D x1    CSF studies:  EEG:  Neuroimagin2024 9:30 AM	CT Brain	Large pontine hemorrhage, SAH, edema, no hydrocephalus. Multiple old infarcts.  2024 1:11 PM	CTA Head/Neck	Pontine hemorrhage stable, no malformation, stable ventricular size, no stenosis or aneurysm.  2024 6:40 PM	CT Brain	Enlarged pontine hemorrhage, SAH in frontal lobes.  2024        	CT Brain	Stable pontine hemorrhage, SAH stable.  10/03/2024 11:06 AM	CT Brain	Stable hematoma, no rebleeding.  10/12/2024 9:16 AM	MRI Brain	Stable hemorrhage, no new bleeding, cerebellar infarct.    Other imagin2024 7:20PM	CTA CHEST	No PE to lobar arteries; aspiration in RLL with groundglass opacity.  2024 7:50PM	US DPLX LE	No DVT in either lower extremity.  2024 6:47AM	CT ABD/PELVIS	Atelectasis; can't exclude pneumonia; no abdominal/pelvic infection.  10/25/2024 12:56AM	CT BRAIN	Evolving pontine hemorrhage, no rebleeding, worsened R mastoid opacification.  10/19/2024 1:13PM	US DPLX LE	No DVT in either lower extremity.  10/01/2024 11:49 AM	US Retroperit	No hydronephrosis; chronic renal disease; Vuong catheter.  10/01/2024 11:14 AM	XR Abdomen	Orogastric tube coiled in stomach; nonspecific bowel gas pattern.  10/07/2024 10:25 PM	XR Abdomen	Nonspecific bowel gas pattern.  10/08/2024 8:01 AM	US Retroperit	Normal kidneys; distended bladder; mild hydronephrosis.  10/10/2024 7:32 AM	XR Abdomen	Nasogastric tube; nonspecific bowel gas pattern.  10/19/2024 1:13 PM	US DPLX Veins	No DVT in lower extremities.    MEDICATIONS: -    ·	heparin   Injectable 5000 SubCutaneous every 8 hours  ·	piperacillin/tazobactam IVPB.. 3.375 IV Intermittent every 8 hours  ·	levETIRAcetam  Solution 1000 Oral every 12 hours  ·	amLODIPine   Tablet 5 milliGRAM(s) Oral daily  ·	doxazosin 8 milliGRAM(s) Oral at bedtime  ·	acetylcysteine 10%  Inhalation 4 Inhalation every 6 hours  ·	albuterol/ipratropium for Nebulization 3 Nebulizer every 6 hours  ·	pantoprazole  Injectable 40 IV Push daily  ·	artificial tears (preservative free) Ophthalmic Solution 1 Right EYE every 4 hours  ·	erythromycin   Ointment 1 Right EYE at bedtime  ·	fluticasone propionate 50 MICROgram(s)/spray Nasal Spray 1 Both Nostrils two times a day  ·	ofloxacin 0.3% Solution 1 Right EYE four times a day  ·	petrolatum Ophthalmic Ointment 1 Both EYES two times a day  ·	sodium chloride 0.65% Nasal 1 Both Nostrils two times a day  ·	acetaminophen   Oral Liquid .. 650 Oral every 6 hours PRN     IV FLUIDS: NS@50cc/hr  DRIPS:  DIET: Maribeth Sauceda at goal  Lines:   Drains:    Carolann (changed 12/15)  Wounds:    CODE STATUS:  Full Code                       GOALS OF CARE:  aggressive                      DISPOSITION:  ICU  ICH Score 3 =================================  NEUROCRITICAL CARE ATTENDING NOTE  =================================    GARETT DELEON   MRN-[id number]  Summary:  44y/M  unknown past medical history (reported stroke and MI by coworkers) presented to [facility] with AMS, Pt was working at [employer] when he was found down by coworkers. EMS called and pt brought to [facility] ED. Intubated, sedated, started on cardene for SBPs in 200s. CT head showed brain stem bleed. Transferred to NSICU for further management.  (30 Sep 2024 12:55)    COURSE IN THE HOSPITAL:  Date	POD	BD	Events	Surgery  	-	-	Transferred from [facility]. A-line placed. Versed discontinued. Hydralazine, 3% saline started. Changed propofol to precedex.	-  10/1	-	-	Stability CTH done. Added labetalol, started TF. Palliative and ethics consulted. Febrile 103°F, pan cultures sent.	-  10/2	-		Desaturation to 80s, FiO2 increased. Fentanyl given, ABG, CXR ordered. Propofol started. Vanc/CTX started. MRSA negative, vanc discontinued. Na 159, starting FW 250q6.	-  10/3	-	3	Na increasing, FW increased to 300q6. Bowel regimen discontinued. vEEG started.	-  10/4	-		Albumin bolus, increased LR. 7% hypersal started for 48hrs. Nephrology consulted for CKD. SBP 170s, hydralazine given.	-  10/5	-		Hydralazine and labetalol for SBP. RT placed for diarrhea.	-  10/6	-		FW increased to 350q4. IV Tylenol for temp 100.6°F.	-  10/7	-		Pancultured for temp 101.8°F.	-  10/8	-		Cr increase, LR decreased. Simethicone added. Increased hydralazine, labetalol. Na 145, FW 250q6. Retaining fluid, bladder scans ordered.	-  10/9	-		Abd x-ray for distention. Changed tube feeds to Jevity 1.5. Tolerating CPAP.	-  10/10	-	10	Vuong for urinary retention. Increased TF to goal. Family leaning toward trach/PEG.	-  10/11	-		Trach/PEG consults placed.	-  10/12	-		MRI brain complete.	-  10/13	-	13	Increased flomax. Hold SQH for trach.	-  10/14	-		S/p trach with pulmonary. NGT placed, CXR confirmed position.	-  10/15	-	15	Resumed feeds. Spiked fever 101°F, pan cultures sent.	-  10/16	-	16	Lokelma for K+ 5.4. Started vanc/zosyn for PNA. PEG held for fever.	-  10/17	-	17	Serum and urine osm ordered. Started sinemet, FW 100q4, IVF discontinued. MRSA negative, vanc discontinued. NGT replaced.	-  10/18	-	18	Amantadine added. Changed zosyn to unasyn for acinetobacter baumannii. Failed TOV, required SC.	-  10/19	-	19	Cardura added for retention. Decreased labetalol, hydralazine. Vuong replaced.	-  10/20	-	20	NGT dislodged, replaced. PEG scheduled for tomorrow. Lokelma for hyperkalemia.  10/21	-	21	s/p PEG  10/22	-	22	No significant events overnight. 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  trach collar 40%  10/24   -	24	No significant events overnight.   10/25   -	25	No significant events overnight.   10/26   -	26	Anisocoria overnight, given 23.4% x1, CT stable; briefly back on vent, ABG unremarkable, now back on trach collar; exam - following commands!  10/27   -	27	No significant events overnight.     Date	POD	BD	Events	  10/19		19	Cardura 2mg added for retention. Labetalol decreased. Vuong replaced.	  10/20		20	NGT dislodged, replaced. PEG placement scheduled for tomorrow. Free water increased. Labetalol decreased. Lokelma given for hyperkalemia.	  10/21	0	21	PEG placement with Gen Surg. Labetalol decreased. Cardura increased. Started Lokelma and Phoslo. Vuong discontinued.	PEG placement with Gen Surg  10/22		22	Plan to start tube feeding via PEG. Labetalol discontinued. CPAP 5/ for Cheyne-Vaughn respiration.	  10/23		23	Hydralazine discontinued. Trach collar trial. Rectal tube placed.	  10/24		24	Lokelma held for diarrhea. Cardura increased. Jevity switched to Nepro. Vuong placed for high urine output.	  10/25		25	Change in neuro status: anisocoria. Emergent CTH: resolved pontine hemorrhage, continued edema.	  10/26		26	Lokelma and free water resumed. 23.4% NaCl given. Emergent CTH: resolved pontine hemorrhage, continued edema.	  10/27		27	Neuro stable.	  10/28		28	Neuro stable. Miralax ordered. Vuong removed, pending TOV.	  10/29		29	2L NS over 8 hrs for increased BUN/Cr ratio. Vuong placed.	  10/31		31	Na 149, increased free water to 200q6. 1L NS for uptrending BUN.	  		32	1L NS for dehydration. Na 146, increased FW to 250q6.	  		33	Neuro stable. 500cc bolus for tachycardia.	  11/3		34	Neuro stable. Febrile to 101.9°F, pan cultured. 500cc NS bolus for sinus tachycardia.	  		35	Neuro stable. 1L NS for tachycardia. Sputum (+) for Stenotrophomonas.	  		36	Neuro stable. Vancomycin discontinued. Chest PT BID. ID consulted. Continue Zosyn.	  		37	Blood cx (+) for Klebsiella. Zosyn changed to Cefepime. CTAP ordered.	  		38	Maintenance fluids started. Pending CT A/P.	  		39	CT CAP negative for infection.	  11/10		41	Desat to 85 on trach collar, O2 increased. Sat 94%. Fever, pancultured. AM ABG pO2 79.	  		42	Tachycardia 130s, desat to 90 on 10L. Cough, temp 101.4, given 1g IV Tylenol and 500cc NS. Fever and HR downtrending.	  		43	Fever and HR downtrending, satting 97% on 70% FIO2.	  		44	Standing Tylenol for tachycardia. Desat to 80s, O2 increased. Pending CTA PE protocol.	  		45	Neuro stable. Resp therapy decreased FiO2 to 70%.	  11/15		46	Rapid called for desat 30s, tachycardia 140s. Upgraded to ICU. Vent started. POCUS and CXR unremarkable. ABG improved.	  		47	POCUS showed collapsible IVC, given 1L bolus. Vanco/Cefepime started for PNA. MRSA swab negative, Vanco discontinued.	  		48	1L bolus for tachycardia. Fever 101, cultured. 1.5L bolus, 5 IV Lopressor. Stenotrophomonas on sputum cx.	  		49	Consulted ID, Cefepime switched to Bactrim x7 days. Started Hydrochlorothiazine 12.5mg daily.	  		50	Tachycardia 120s, given Tylenol and 500cc NS. Tolerating 5/5, switched to TCx. Bactrim discontinued. Vuong discontinued.	  		51	Satting low 90s, mildly tachycardic to 110s. O2 improved on TCx. CPAP 5/5 placed back on.	  		52	Tolerating CPAP 5/5. HCTZ held for Cr bump, straight cath increased.	  		53	Resumed Phoslo. Vuong placed. Resumed HCTZ.	  		54	Holding Tylenol for possible fever, pan culture if febrile. Cr improved. Cont. CPAP.	  		55	Neuro stable. HCTZ dc'd, started Lisinopril 5. Lokelma dc'd for K 3.7.	  		56	Neuro stable. hypotension to 60s SBP, given fluid bolus  : BD 57, GEORGE overnight. Neuro stable. Dc'd phoslo.   : BD 58, GEORGE overnight. Neuro stable.    : BD 59. Vuong replaced.   : GEORGE.  : GEORGE, neuro stable.   : BD 62, GEORGE overnight  : BD 63, GEORGE overnight.; Given 1L bolus of LR for uptrending BUN/Cr.  12/3: BD 64. Reinstated eye gtt/moisture chamber given increased Rt eye injection  : BD 65. GEORGE overnight. Attempted to speak with ophtho regarding eyelid weight/closure but no answer, full mailbox.   : BD 66, GEORGE overnight, bowel regimen increased and had BM.   : BD 67, GEORGE overnight, neuro stable.  : GEORGE overnight, neuro stable. /110s, given x1 hydralazine 10 mg IVP. Restarted home amlodipine 5mg.  : GEORGE. OOB to chair.     : GEORGE, mucomyst added for thick secretions, simethicone for abd distension, abd xray with stool burden, increased bowel regimen.   : GEORGE overnight.   : GEORGE overnight.   : GEORGE overnight  12/15: o/n Patient became tachycardic HR 120s and 10 minutes later O2 sat dropped as low as 89%. Patient suctioned without improvement in O2 sat and tube feeds found in suction catheter. TFs held.  STAT CXR ordered. STAT labs sent. Respiratory therapist called to bedside and patient trach connected to ventilator. After connection to ventilator and further suctioning O2 sat improved to 97% but patient HR remains 120-130s. Upgraded to NSICU for further management. Vancomycin and zosyn started. CTA  chest PE protocol and CTH ordered. Blood cultures sent.given 500cc bolus, rpt ABG sent pO2 243, CTH and CTA chest done. FS while NPO, FiO2 dec 50 pending ABG. sputum cx positive for few GPC and GNR.   : GEORGE overnight, restarted amlodipine, troponin 75   : GEORGE overnight, neuro stable, CPAP x2 hours 10/6, apneic back to full vent; OOB to chair, sudden tachycardia 140's, L facial twitching, still following commands, given 2G levetiracetam, ativan 2mg x2, fluid bolus, metoprolol, EEG    full vent support overnight, albumin    Past Medical History: Acute myocardial infarction CVA (cerebral vascular accident)  Allergies:  Allergy Status Unknown  Home meds:     PHYSICAL EXAMINATION  T(C): 36.6 (24 @ 05:18), Max: 36.9 (24 @ 17:59) HR: 78 (24 @ 07:00) (72 - 103) BP: 118/84 (24 @ 07:00) (118/84 - 168/113)  RR: 14 (24 @ 07:00) (14 - 18)SpO2: 100% (24 @ 07:00) (97% - 100%)  NEUROLOGIC EXAMINATION:  Patient awake, alert, Oriented x2-3 with choices, tracks, RANDELL, R triceps 3/5 L arm 0/5  B LE withdraws  GENERAL: full vent support 40% 400 14 +6  EENT:  anicteric,  CARDIOVASCULAR: (+) S1 S2, normal rate and regular rhythm  PULMONARY: clear to auscultation bilaterally (minimalsecretion)  ABDOMEN: soft, nontender with normoactive bowel sounds  EXTREMITIES: no edema  SKIN: no rash    LABS:   CAPILLARY BLOOD GLUCOSE 97 92 87                10.1   4.53  )-----------( 194      ( 19 Dec 2024 05:30 )             31.8     143  |  107  |  18  ----------------------------<  113[H]  3.4[L]   |  24  |  1.14 (1.06)    Ca    9.0      19 Dec 2024 05:30  Phos  3.0       Mg     2.1      @ 07:01  -   @ 07:00  --------------------------------------------------------  IN: 1175 mL / OUT: 3100 mL / NET: -1925 mL     @ 07:01  -   @ 07:48  --------------------------------------------------------  IN: 25 mL / OUT: 0 mL / NET: 25 mL        Bacteriology:  10/16 sputum culture acinetobacter  10/15 Blood CS NG5D    12/15 Sputum CS stenotrophomona    CSF studies:  EEG:  Neuroimagin2024 9:30 AM	CT Brain	Large pontine hemorrhage, SAH, edema, no hydrocephalus. Multiple old infarcts.  2024 1:11 PM	CTA Head/Neck	Pontine hemorrhage stable, no malformation, stable ventricular size, no stenosis or aneurysm.  2024 6:40 PM	CT Brain	Enlarged pontine hemorrhage, SAH in frontal lobes.  2024        	CT Brain	Stable pontine hemorrhage, SAH stable.  10/03/2024 11:06 AM	CT Brain	Stable hematoma, no rebleeding.  10/12/2024 9:16 AM	MRI Brain	Stable hemorrhage, no new bleeding, cerebellar infarct.    Other imagin2024 7:20PM	CTA CHEST	No PE to lobar arteries; aspiration in RLL with groundglass opacity.  2024 7:50PM	US DPLX LE	No DVT in either lower extremity.  2024 6:47AM	CT ABD/PELVIS	Atelectasis; can't exclude pneumonia; no abdominal/pelvic infection.  10/25/2024 12:56AM	CT BRAIN	Evolving pontine hemorrhage, no rebleeding, worsened R mastoid opacification.  10/19/2024 1:13PM	US DPLX LE	No DVT in either lower extremity.  10/01/2024 11:49 AM	US Retroperit	No hydronephrosis; chronic renal disease; Vuong catheter.  10/01/2024 11:14 AM	XR Abdomen	Orogastric tube coiled in stomach; nonspecific bowel gas pattern.  10/07/2024 10:25 PM	XR Abdomen	Nonspecific bowel gas pattern.  10/08/2024 8:01 AM	US Retroperit	Normal kidneys; distended bladder; mild hydronephrosis.  10/10/2024 7:32 AM	XR Abdomen	Nasogastric tube; nonspecific bowel gas pattern.  10/19/2024 1:13 PM	US DPLX Veins	No DVT in lower extremities.    MEDICATIONS:     ·	heparin   Injectable 5000 SubCutaneous every 8 hours  ·	piperacillin/tazobactam IVPB.. 3.375 IV Intermittent every 8 hours  ·	levETIRAcetam  Solution 1000 Oral every 12 hours  ·	amLODIPine   Tablet 5 milliGRAM(s) Oral daily  ·	doxazosin 8 milliGRAM(s) Oral at bedtime  ·	acetylcysteine 10%  Inhalation 4 Inhalation every 6 hours  ·	albuterol/ipratropium for Nebulization 3 Nebulizer every 6 hours  ·	pantoprazole  Injectable 40 IV Push daily  ·	artificial tears (preservative free) Ophthalmic Solution 1 Right EYE every 4 hours  ·	erythromycin   Ointment 1 Right EYE at bedtime  ·	fluticasone propionate 50 MICROgram(s)/spray Nasal Spray 1 Both Nostrils two times a day  ·	ofloxacin 0.3% Solution 1 Right EYE four times a day  ·	petrolatum Ophthalmic Ointment 1 Both EYES two times a day  ·	sodium chloride 0.65% Nasal 1 Both Nostrils two times a day  ·	acetaminophen   Oral Liquid .. 650 Oral every 6 hours PRN     IV FLUIDS: IVL  DRIPS:  DIET: Maribeth Sauceda at goal  Lines:   Drains:    Vuong (changed 12/15)  Wounds:    CODE STATUS:  Full Code                       GOALS OF CARE:  aggressive                      DISPOSITION:  ICU  ICH Score 3

## 2024-12-19 NOTE — PROGRESS NOTE ADULT - ASSESSMENT
44y/M with  large pontine hemorrhage, SAH, brain edema; ?locked-in  CVA  h/o MI  acute on chronic CKD, hydronephrosis    PLAN:   NEURO: neurochecks VS q4 VSq1h, PRN pain meds   neurostim: off   palliative / ethics follow-up  VEEG - f/u read, if NEG d/c; start levetiracetam 1G BID   CT scan today  REHAB:  physical therapy evaluation and management    EARLY MOB:  bedrest HOB up    PULM:  no weaning, pulmo toilette, standing ipratropium / albuterol / mucomyst; chest PT, HOB up  CARDIO:  SBP goal 100-160mm Hg, EF 75%  ENDO:  Blood sugar goals 140-180 mg/dL, A1C 5.4, ISS  GI:  off bowel regimen (diarrhea)  DIET: TF banatrol - increase to goal  RENAL:  d/c IVF, Na goal 135-145  HEM/ONC:  Hb stable  VTE Prophylaxis: SCDs, SQH  ID: afebrile, no leukocytosis, piperacillin/tazobactam until 12/20  Social: will update family    Active issues:  What's keeping patient in the ICU? vent   What is this patient's dispo plan? stepdown once stable    ATTENDING ATTESTATION:  Patient at high risk for neurological deterioration or death due to:  ICU delirium, aspiration PNA, DVT / PE.  Critical care time:  I have personally provided 60 minutes of critical care time, excluding time spent on separate procedures.      Plan discussed with RN, house staff.   44y/M with  large pontine hemorrhage, SAH, brain edema; ?locked-in  CVA  h/o MI  acute on chronic CKD, hydronephrosis    PLAN:   NEURO: neurochecks q4 VSq1h, PRN pain meds   neurostim: off   palliative / ethics follow-up  cont levetiracetam 1G BID   REHAB:  physical therapy evaluation and management    EARLY MOB:  bedrest HOB up    PULM:  SIMV 12 pulmo toilette, standing ipratropium / albuterol / mucomyst; chest PT, HOB up  CARDIO:  SBP goal 100-160mm Hg, EF 75%  ENDO:  Blood sugar goals 140-180 mg/dL, A1C 5.4, off ISS  GI:  off bowel regimen (diarrhea)  DIET: TF banatrol - increase to goal  RENAL:  Na goal 135-145  HEM/ONC:  Hb stable  VTE Prophylaxis: SCDs, SQH  ID: afebrile, no leukocytosis, piperacillin/tazobactam until 12/20  Social: will update family    Active issues:  What's keeping patient in the ICU? vent   What is this patient's dispo plan? stepdown once stable    ATTENDING ATTESTATION:  Patient at high risk for neurological deterioration or death due to:  ICU delirium, aspiration PNA, DVT / PE.  Critical care time:  I have personally provided 60 minutes of critical care time, excluding time spent on separate procedures.      Plan discussed with RN, house staff.

## 2024-12-19 NOTE — PROGRESS NOTE ADULT - SUBJECTIVE AND OBJECTIVE BOX
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 s/p peg 10/21. Prolonged hospital stay inability to transfer to Rehab due to lack of insurance.   On 12/15 AM while in stepdown unit patient became tachycardic HR 120s and 10 minutes later O2 sat dropped as low as 89%. Patient suctioned without improvement in O2 sat and tube feeds found in suction catheter. TFs held.  STAT CXR ordered. STAT labs sent. Respiratory therapist called to bedside and patient trach connected to ventilator. After connection to ventilator and further suctioning O2 sat improved to 97% but patient HR remains 120-130s. Upgraded to NSICU for further management.        ICU Vital Signs Last 24 Hrs  T(C): 36.4 (19 Dec 2024 18:33), Max: 37.1 (19 Dec 2024 14:17)  T(F): 97.6 (19 Dec 2024 18:33), Max: 98.7 (19 Dec 2024 14:17)  HR: 69 (19 Dec 2024 19:00) (65 - 103)  BP: 154/108 (19 Dec 2024 19:00) (118/84 - 156/102)  BP(mean): 127 (19 Dec 2024 19:00) (97 - 127)  RR: 14 (19 Dec 2024 19:00) (13 - 17)  SpO2: 100% (19 Dec 2024 19:00) (97% - 100%)      12-18-24 @ 07:01  -  12-19-24 @ 07:00  --------------------------------------------------------  IN: 1175 mL / OUT: 3100 mL / NET: -1925 mL    12-19-24 @ 07:01  -  12-19-24 @ 19:30  --------------------------------------------------------  IN: 890 mL / OUT: 780 mL / NET: 110 mL      Mode: SIMV (Synchronized Intermittent Mandatory Ventilation), RR (machine): 10, TV (machine): 400, FiO2: 40, PEEP: 6, PS: 10, ITime: 1, MAP: 8, PIP: 13      PHYSICAL EXAM:  Gen: Awake, trach to vent  Neurological exam   Mental status: Awake, attend, oriented x 2 with choices by looking down, does not fully track but able to do so with left eye,  (EOMI impaired on R), following commands  CV: S1/S2, RRR  Lungs: Decreased breath sounds bilaterally, no wheezing  Abdomen: Bowel sounds  Extremities:  No edema      MEDICATIONS:   acetaminophen   Oral Liquid .. 650 milliGRAM(s) Oral every 6 hours PRN  acetylcysteine 10%  Inhalation 4 milliLiter(s) Inhalation every 6 hours  albuterol/ipratropium for Nebulization 3 milliLiter(s) Nebulizer every 6 hours  amLODIPine   Tablet 5 milliGRAM(s) Oral daily  artificial tears (preservative free) Ophthalmic Solution 1 Drop(s) Right EYE every 4 hours  chlorhexidine 0.12% Liquid 15 milliLiter(s) Oral Mucosa every 12 hours  chlorhexidine 2% Cloths 1 Application(s) Topical <User Schedule>  doxazosin 8 milliGRAM(s) Oral at bedtime  erythromycin   Ointment 1 Application(s) Right EYE at bedtime  fluticasone propionate 50 MICROgram(s)/spray Nasal Spray 1 Spray(s) Both Nostrils two times a day  heparin   Injectable 5000 Unit(s) SubCutaneous every 8 hours  levETIRAcetam  Solution 1000 milliGRAM(s) Oral every 12 hours  ofloxacin 0.3% Solution 1 Drop(s) Right EYE four times a day  pantoprazole  Injectable 40 milliGRAM(s) IV Push daily  petrolatum Ophthalmic Ointment 1 Application(s) Both EYES two times a day  piperacillin/tazobactam IVPB.. 3.375 Gram(s) IV Intermittent every 8 hours  sodium chloride 0.65% Nasal 1 Spray(s) Both Nostrils two times a day    LABS:                       10.1   4.53  )-----------( 194      ( 19 Dec 2024 05:30 )             31.8     12-19    143  |  107  |  18  ----------------------------<  113[H]  3.4[L]   |  24  |  1.14    Ca    9.0      19 Dec 2024 05:30  Phos  3.0     12-19  Mg     2.1     12-19        ASSESSMENT AND PLAN  45 y/o PMH ?stroke/MI   Admitted with 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   S/P trach 10/14. s/p peg 10/21. Re-upgrade to ICU 2/2 desaturation event and suctioning requirements 11/15. Re-upgrade again on 12/15 for desaturation event.      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 DC  CT head 12/18 stable  Activity: PT/OT     CV; Tachycardia in the setting of hypoxia - (PE? hypovolemia?/hypoxia/respiratory distress?). RESOLVED.  CTA chest PE no PE in major vessels  -160. Vitals Q2  Continue amlodipine 5mg (lisinopril held)  TTE (9/30) EF 75%. Repeat TTE reviewed   Troponin; 51-> 75->53    RESPIRATION: Acute hypoxic respiratory failure requiring mechanical ventilation.   Full vent support/lung protective ventilation. CPAP as tolerated  Aggressive chest PT/duonebs, aggressive pulmonary toilet  CTA chest PA study negative for PE in major vessels; did show near-complete to complete LLL atelectasis. Partial RLL atelectasis    GI  TF via PEG at goal  Bowel regimen, last BM 12/18  PPI  Abdominal Xray negative for ileus    RENAL: ANIKA on CKD. Resolved  Urine retention: Replaced gabriel catheter 12/15  Free water flushes  CKD: Trend BUN/Cr    ENDO:  A1c 5.4  Fingersticks, ISS    HEME:  DVT ppx: SCDs, SQH 5000u q8h   LE dopplers negative 11/11  CTA chest PA study negative for PE    ID: Tachycardic/hypoxic, aspiration event. Suspect aspiration PNA  S/P Vanc. On Zosyn  Blood cultures NGTD. Sputum stenotrophomonas, GPC/GNR.      MISC:  Ophthalmology consulted for keratitis  Erythromycin ointment to rt eye q4hrs, ofloxacin ointment to rt eye QID, artificial tears to rt eye q2hrs, moisture chamber at bedtime    MISC:    SOCIAL/FAMILY:  [] awaiting [x] updated at bedside [] family meeting    CODE STATUS:  [x] Full Code [] DNR [] DNI [] Palliative/Comfort Care    DISPOSITION:  [x] ICU [] Stroke Unit [] Floor [] EMU [] RCU [] PCU    Time spent: 45 critical care minutes                           NSCU ATTENDING -- ADDITIONAL PROGRESS NOTE    Nighttime rounds were performed       HPI   Patient is a 43 y/o male with PMH ?stroke/MI initially admitted on 9/30 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 s/p peg 10/21. Prolonged hospital stay inability to transfer to Rehab due to lack of insurance.   On 12/15 AM while in stepdown unit patient became tachycardic HR 120s and 10 minutes later O2 sat dropped as low as 89%. Patient suctioned without improvement in O2 sat and tube feeds found in suction catheter. TFs held.  STAT CXR ordered. STAT labs sent. Respiratory therapist called to bedside and patient trach connected to ventilator. After connection to ventilator and further suctioning O2 sat improved to 97% but patient HR remains 120-130s. Upgraded to NSICU for further management.      ICU Vital Signs Last 24 Hrs  T(C): 36.4 (19 Dec 2024 18:33), Max: 37.1 (19 Dec 2024 14:17)  T(F): 97.6 (19 Dec 2024 18:33), Max: 98.7 (19 Dec 2024 14:17)  HR: 69 (19 Dec 2024 19:00) (65 - 103)  BP: 154/108 (19 Dec 2024 19:00) (118/84 - 156/102)  BP(mean): 127 (19 Dec 2024 19:00) (97 - 127)  RR: 14 (19 Dec 2024 19:00) (13 - 17)  SpO2: 100% (19 Dec 2024 19:00) (97% - 100%)      12-18-24 @ 07:01  -  12-19-24 @ 07:00  --------------------------------------------------------  IN: 1175 mL / OUT: 3100 mL / NET: -1925 mL    12-19-24 @ 07:01  -  12-19-24 @ 19:30  --------------------------------------------------------  IN: 890 mL / OUT: 780 mL / NET: 110 mL      Mode: SIMV (Synchronized Intermittent Mandatory Ventilation), RR (machine): 10, TV (machine): 400, FiO2: 40, PEEP: 6, PS: 10, ITime: 1, MAP: 8, PIP: 13      PHYSICAL EXAM:  Gen: Awake, trach to vent  Neurological exam   Mental status: Awake, attend, oriented x 2 with choices by looking down, does not fully track but able to do so with left eye,  (EOMI impaired on R), following commands  CV: S1/S2, RRR  Lungs: Decreased breath sounds bilaterally, no wheezing  Abdomen: Bowel sounds  Extremities:  No edema      MEDICATIONS:   acetaminophen   Oral Liquid .. 650 milliGRAM(s) Oral every 6 hours PRN  acetylcysteine 10%  Inhalation 4 milliLiter(s) Inhalation every 6 hours  albuterol/ipratropium for Nebulization 3 milliLiter(s) Nebulizer every 6 hours  amLODIPine   Tablet 5 milliGRAM(s) Oral daily  artificial tears (preservative free) Ophthalmic Solution 1 Drop(s) Right EYE every 4 hours  chlorhexidine 0.12% Liquid 15 milliLiter(s) Oral Mucosa every 12 hours  chlorhexidine 2% Cloths 1 Application(s) Topical <User Schedule>  doxazosin 8 milliGRAM(s) Oral at bedtime  erythromycin   Ointment 1 Application(s) Right EYE at bedtime  fluticasone propionate 50 MICROgram(s)/spray Nasal Spray 1 Spray(s) Both Nostrils two times a day  heparin   Injectable 5000 Unit(s) SubCutaneous every 8 hours  levETIRAcetam  Solution 1000 milliGRAM(s) Oral every 12 hours  ofloxacin 0.3% Solution 1 Drop(s) Right EYE four times a day  pantoprazole  Injectable 40 milliGRAM(s) IV Push daily  petrolatum Ophthalmic Ointment 1 Application(s) Both EYES two times a day  piperacillin/tazobactam IVPB.. 3.375 Gram(s) IV Intermittent every 8 hours  sodium chloride 0.65% Nasal 1 Spray(s) Both Nostrils two times a day    LABS:                       10.1   4.53  )-----------( 194      ( 19 Dec 2024 05:30 )             31.8     12-19    143  |  107  |  18  ----------------------------<  113[H]  3.4[L]   |  24  |  1.14    Ca    9.0      19 Dec 2024 05:30  Phos  3.0     12-19  Mg     2.1     12-19        ASSESSMENT AND PLAN  43 y/o PMH ?stroke/MI   Admitted with 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   S/P trach 10/14. s/p peg 10/21. Re-upgrade to ICU 2/2 desaturation event and suctioning requirements 11/15. Re-upgrade again on 12/15 for desaturation event.      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 DC  CT head 12/18 stable  Activity: PT/OT     CV; Tachycardia in the setting of hypoxia - (PE? hypovolemia?/hypoxia/respiratory distress?). RESOLVED.  CTA chest PE no PE in major vessels  -160. Vitals Q2  Continue amlodipine 5mg  TTE (9/30) EF 75%. Repeat TTE reviewed   Troponin; 51-> 75->53    RESPIRATION: Acute hypoxic respiratory failure requiring mechanical ventilation.   Full vent support/lung protective ventilation. CPAP/ SIMV as tolerated  Aggressive chest PT/duonebs, aggressive pulmonary toilet  CTA chest PA study negative for PE in major vessels; did show near-complete to complete LLL atelectasis. Partial RLL atelectasis    GI  TF via PEG at goal  Bowel regimen, last BM 12/19  PPI ppx  Abdominal Xray negative for ileus    RENAL: ANIKA on CKD. Resolved  Urine retention: Replaced gabriel catheter 12/15  Free water flushes  CKD: Trend BUN/Cr    ENDO:  A1c 5.4  Fingersticks, ISS    HEME:  DVT ppx: SCDs, SQH 5000u q8h   LE dopplers negative 11/11  CTA chest PA study negative for PE    ID: Tachycardic/hypoxic, aspiration event. Suspect aspiration PNA  S/P Vanc. On Zosyn to complete course  Blood cultures NGTD. Sputum stenotrophomonas, GPC/GNR.      MISC:  Ophthalmology consulted for keratitis  Erythromycin ointment to rt eye q4hrs, ofloxacin ointment to rt eye QID, artificial tears to rt eye q2hrs, moisture chamber at bedtime    MISC:    SOCIAL/FAMILY:  [] awaiting [x] updated at bedside [] family meeting    CODE STATUS:  [x] Full Code [] DNR [] DNI [] Palliative/Comfort Care    DISPOSITION:  [x] ICU [] Stroke Unit [] Floor [] EMU [] RCU [] PCU    Time spent: 45 critical care minutes

## 2024-12-19 NOTE — CHART NOTE - NSCHARTNOTEFT_GEN_A_CORE
Admitting Diagnosis:   Patient is a 44y old  Male who presents with a chief complaint of brain stem bleed (19 Dec 2024 03:37)  PAST MEDICAL & SURGICAL HISTORY:  Acute myocardial infarction  CVA (cerebral vascular accident)      Current Nutrition Order: NPO with tube feeding via gastrostomy: Maribeth Sauceda Renal Support 1.8: at goal of 60mL/hour x 18 hours, providing 1080mL total volume, 1944 calories, 86g protein, ~713mL free water; this meets ~24 calories/kg ideal body weight, ~1.1g protein/kg ideal body weight; with Banatrol Tube Feeding 2x/day    PO Intake: N/A, pt is NPO with tube feeds meeting 100% estimated nutrient needs    GI Issues: fecal incontinence; loose consistency stools noted per nursing; BM noted on 12/19/24; soft, nontender abdomen with normoactive bowel sounds noted per medical team/nursing exams    Pain: Absence of non-verbal indicators of pain    Skin Integrity: no pressure ulcers, no noted edema; PEG present; Mookie: Pedro      12-18-24 @ 07:01  -  12-19-24 @ 07:00  --------------------------------------------------------  IN: 1175 mL / OUT: 3100 mL / NET: -1925 mL    12-19-24 @ 07:01  -  12-19-24 @ 13:16  --------------------------------------------------------  IN: 430 mL / OUT: 385 mL / NET: 45 mL        Labs:   12-19    143  |  107  |  18  ----------------------------<  113[H]  3.4[L]   |  24  |  1.14    Ca    9.0      19 Dec 2024 05:30  Phos  3.0     12-19  Mg     2.1     12-19      CAPILLARY BLOOD GLUCOSE      POCT Blood Glucose.: 97 mg/dL (18 Dec 2024 21:31)  POCT Blood Glucose.: 92 mg/dL (18 Dec 2024 17:59)      Medications:  MEDICATIONS  (STANDING):  acetylcysteine 10%  Inhalation 4 milliLiter(s) Inhalation every 6 hours  albuterol/ipratropium for Nebulization 3 milliLiter(s) Nebulizer every 6 hours  amLODIPine   Tablet 5 milliGRAM(s) Oral daily  artificial tears (preservative free) Ophthalmic Solution 1 Drop(s) Right EYE every 4 hours  chlorhexidine 0.12% Liquid 15 milliLiter(s) Oral Mucosa every 12 hours  chlorhexidine 2% Cloths 1 Application(s) Topical <User Schedule>  doxazosin 8 milliGRAM(s) Oral at bedtime  erythromycin   Ointment 1 Application(s) Right EYE at bedtime  fluticasone propionate 50 MICROgram(s)/spray Nasal Spray 1 Spray(s) Both Nostrils two times a day  heparin   Injectable 5000 Unit(s) SubCutaneous every 8 hours  levETIRAcetam  Solution 1000 milliGRAM(s) Oral every 12 hours  ofloxacin 0.3% Solution 1 Drop(s) Right EYE four times a day  pantoprazole  Injectable 40 milliGRAM(s) IV Push daily  petrolatum Ophthalmic Ointment 1 Application(s) Both EYES two times a day  piperacillin/tazobactam IVPB.. 3.375 Gram(s) IV Intermittent every 8 hours  potassium chloride   Powder 40 milliEquivalent(s) Oral every 4 hours  sodium chloride 0.65% Nasal 1 Spray(s) Both Nostrils two times a day    MEDICATIONS  (PRN):  acetaminophen   Oral Liquid .. 650 milliGRAM(s) Oral every 6 hours PRN Temp greater or equal to 38.5C (101.3F), Mild Pain (1 - 3)    Dosing Anthropometrics:   Height for BMI (FEET)	5 Feet  Height for BMI (INCHES)	8 Inch(s)  Height for BMI (CM)	172.72 Centimeter(s)  Weight for BMI (lbs)	176.4 lb  Weight for BMI (kg)	80 kg  Body Mass Index	              26.8  ideal body weight:               154 pounds / 70 kg   %ideal body weight             114%     Weight Change: No new wt obtained since admit, strongly recommend nursing to obtain new wt to track/ trend changes     Estimated energy needs:  Weight used for calculations	ideal body weight  Estimated Energy Needs Weight (lbs)	154 lb  Estimated Energy Needs Weight (kg)	69.8 kg  Estimated Energy Needs From (christiana/kg)	25  Estimated Energy Needs To (christiana/kg)	30  Estimated Energy Needs Calculated From (christiana/kg)	1745  Estimated Energy Needs Calculated To (christiana/kg)	2094  Weight used for calculations	ideal body weight  Estimated Protein Needs Weight (lbs)	154 lb  Estimated Protein Needs Weight (kg)	69.8 kg  Estimated Protein Needs From (g/kg)	1.2  Estimated Protein Needs To (g/kg)	1.4  Estimated Protein Needs Calculated From (g/kg)	83.76  Estimated Protein Needs Calculated To (g/kg)	97.72  Estimated Fluid Needs Weight (lbs)	154 lb  Estimated Fluid Needs Weight (kg)	69.8 kg  Estimated Fluid Needs From (ml/kg)	25  Estimated Fluid Needs To (ml/kg)	30  Estimated Fluid Needs Calculated From (ml/kg)	1745  Estimated Fluid Needs Calculated To (ml/kg)	2094  Other Calculations	Pt is >100% ideal body weight, stepped back up to the ICU, thus ideal body weight used for all calculations. Needs adjusted for age, clinical course.     Subjective: This is a 44 year old male, unknown past medical history (reported stroke and MI by coworkers) presented to [facility] with AMS, Pt was working at [employer] when he was found down by coworkers. EMS called and pt brought to [facility] ED. Intubated, sedated, started on cardene for SBPs in 200s. CT head showed brain stem bleed. Transferred to NSICU for further management.    Patient seen at bedside on [address] [place] for nutrition follow up. Pt afebrile, no drips/pressor support, on ventilator, SIMV mode (SpO2 %). Current diet order: remains NPO with tube feeds, trickle feeds on 12/17/24, back to goal feed rate on 12/18/24, as of today per team, pt has been meeting 100% estimated nutrient needs via tube feedings at goal. Pt is receiving Unata Renal 1.8 formula, via PEG, pt's feeds at goal of 60mL/hour x 18 hours, with Banatrol 2x/day. Loose stools noted, medical team and nursing aware. Labs reviewed: low potassium (3.4), other electrolytes and other labs are within normal limits at this time; medical team is aware. Education: deferred at this time related to pt's cognitive status. RD to follow up and remain available prn. Please see nutrition recommendations below.    Previous Nutrition Diagnosis: Inadequate Oral Intake related to inability to meet nutritional demands via PO as evidenced by NPO with tube feedings    Active [  x ]  Resolved [   ]    Goal: Pt will consume >/= 75% of all meals and supplements via tolerated route    Nutrition Recommendations:  1. NPO with tube feedings, continue with current tube feed regimen to continue to meet 100% estimated needs:  **Unata Renal Support 1.8: at goal of 60mL/hour x 18 hours, providing 1080mL total volume, 1944 calories, 86g protein, ~713mL free water; this meets ~24 calories/kg ideal body weight, ~1.1g protein/kg ideal body weight**  **Provide Banatrol Tube Feeding prn**  **Maintain aspiration precautions at all times; monitor for signs/symptoms of intolerance**  2. Monitor weights (weekly), GI function, skin integrity; electrolytes, BMP, glucose, CBC per MD discretion *renal indices*  3. Pain and bowel regimen per medical team  4. Align nutrition with goals of care at all times  5. Recommend nursing to obtain new weights to track/trend changes    Education: Education: deferred at this time related to pt's cognitive status. RD to follow up and remain available prn.    Risk Level: High [   ] Moderate [ x ] Low [   ].

## 2024-12-20 LAB
ANION GAP SERPL CALC-SCNC: 11 MMOL/L — SIGNIFICANT CHANGE UP (ref 5–17)
BUN SERPL-MCNC: 22 MG/DL — SIGNIFICANT CHANGE UP (ref 7–23)
CALCIUM SERPL-MCNC: 8.7 MG/DL — SIGNIFICANT CHANGE UP (ref 8.4–10.5)
CHLORIDE SERPL-SCNC: 107 MMOL/L — SIGNIFICANT CHANGE UP (ref 96–108)
CO2 SERPL-SCNC: 25 MMOL/L — SIGNIFICANT CHANGE UP (ref 22–31)
CREAT SERPL-MCNC: 1.25 MG/DL — SIGNIFICANT CHANGE UP (ref 0.5–1.3)
CULTURE RESULTS: SIGNIFICANT CHANGE UP
EGFR: 72 ML/MIN/1.73M2 — SIGNIFICANT CHANGE UP
EGFR: 72 ML/MIN/1.73M2 — SIGNIFICANT CHANGE UP
GLUCOSE SERPL-MCNC: 142 MG/DL — HIGH (ref 70–99)
HCT VFR BLD CALC: 32.3 % — LOW (ref 39–50)
HGB BLD-MCNC: 10.5 G/DL — LOW (ref 13–17)
MAGNESIUM SERPL-MCNC: 1.9 MG/DL — SIGNIFICANT CHANGE UP (ref 1.6–2.6)
MCHC RBC-ENTMCNC: 31.1 PG — SIGNIFICANT CHANGE UP (ref 27–34)
MCHC RBC-ENTMCNC: 32.5 G/DL — SIGNIFICANT CHANGE UP (ref 32–36)
MCV RBC AUTO: 95.6 FL — SIGNIFICANT CHANGE UP (ref 80–100)
NRBC # BLD: 0 /100 WBCS — SIGNIFICANT CHANGE UP (ref 0–0)
NRBC BLD-RTO: 0 /100 WBCS — SIGNIFICANT CHANGE UP (ref 0–0)
PHOSPHATE SERPL-MCNC: 3 MG/DL — SIGNIFICANT CHANGE UP (ref 2.5–4.5)
PLATELET # BLD AUTO: 176 K/UL — SIGNIFICANT CHANGE UP (ref 150–400)
POTASSIUM SERPL-MCNC: 3.8 MMOL/L — SIGNIFICANT CHANGE UP (ref 3.5–5.3)
POTASSIUM SERPL-SCNC: 3.8 MMOL/L — SIGNIFICANT CHANGE UP (ref 3.5–5.3)
RBC # BLD: 3.38 M/UL — LOW (ref 4.2–5.8)
RBC # FLD: 15.2 % — HIGH (ref 10.3–14.5)
SODIUM SERPL-SCNC: 143 MMOL/L — SIGNIFICANT CHANGE UP (ref 135–145)
SPECIMEN SOURCE: SIGNIFICANT CHANGE UP
WBC # BLD: 6.83 K/UL — SIGNIFICANT CHANGE UP (ref 3.8–10.5)
WBC # FLD AUTO: 6.83 K/UL — SIGNIFICANT CHANGE UP (ref 3.8–10.5)

## 2024-12-20 PROCEDURE — 99291 CRITICAL CARE FIRST HOUR: CPT

## 2024-12-20 RX ORDER — OXYCODONE HYDROCHLORIDE 30 MG/1
5 TABLET ORAL EVERY 4 HOURS
Refills: 0 | Status: DISCONTINUED | OUTPATIENT
Start: 2024-12-20 | End: 2024-12-20

## 2024-12-20 RX ORDER — MAGNESIUM SULFATE 500 MG/ML
1 SYRINGE (ML) INJECTION ONCE
Refills: 0 | Status: COMPLETED | OUTPATIENT
Start: 2024-12-20 | End: 2024-12-20

## 2024-12-20 RX ADMIN — OFLOXACIN 1 DROP(S): 3 SOLUTION OPHTHALMIC at 05:46

## 2024-12-20 RX ADMIN — Medication 15 MILLILITER(S): at 18:21

## 2024-12-20 RX ADMIN — Medication 25 GRAM(S): at 05:46

## 2024-12-20 RX ADMIN — OXYCODONE HYDROCHLORIDE 5 MILLIGRAM(S): 30 TABLET ORAL at 16:00

## 2024-12-20 RX ADMIN — AMLODIPINE BESYLATE 5 MILLIGRAM(S): 10 TABLET ORAL at 05:45

## 2024-12-20 RX ADMIN — HEPARIN SODIUM 5000 UNIT(S): 1000 INJECTION INTRAVENOUS; SUBCUTANEOUS at 22:51

## 2024-12-20 RX ADMIN — Medication 40 MILLIGRAM(S): at 12:46

## 2024-12-20 RX ADMIN — OFLOXACIN 1 DROP(S): 3 SOLUTION OPHTHALMIC at 18:22

## 2024-12-20 RX ADMIN — Medication 100 GRAM(S): at 06:44

## 2024-12-20 RX ADMIN — ERYTHROMYCIN 1 APPLICATION(S): 5 OINTMENT OPHTHALMIC at 22:52

## 2024-12-20 RX ADMIN — HEPARIN SODIUM 5000 UNIT(S): 1000 INJECTION INTRAVENOUS; SUBCUTANEOUS at 14:46

## 2024-12-20 RX ADMIN — Medication 1 SPRAY(S): at 05:46

## 2024-12-20 RX ADMIN — LEVETIRACETAM 1000 MILLIGRAM(S): 10 INJECTION, SOLUTION INTRAVENOUS at 00:56

## 2024-12-20 RX ADMIN — FLUTICASONE PROPIONATE 1 SPRAY(S): 50 SPRAY, METERED NASAL at 18:21

## 2024-12-20 RX ADMIN — Medication 1 DROP(S): at 04:42

## 2024-12-20 RX ADMIN — Medication 1 APPLICATION(S): at 05:47

## 2024-12-20 RX ADMIN — OXYCODONE HYDROCHLORIDE 5 MILLIGRAM(S): 30 TABLET ORAL at 09:56

## 2024-12-20 RX ADMIN — IPRATROPIUM BROMIDE AND ALBUTEROL SULFATE 3 MILLILITER(S): .5; 2.5 SOLUTION RESPIRATORY (INHALATION) at 04:10

## 2024-12-20 RX ADMIN — ACETYLCYSTEINE 4 MILLILITER(S): 200 INHALANT RESPIRATORY (INHALATION) at 09:59

## 2024-12-20 RX ADMIN — Medication 1 SPRAY(S): at 18:22

## 2024-12-20 RX ADMIN — ACETYLCYSTEINE 4 MILLILITER(S): 200 INHALANT RESPIRATORY (INHALATION) at 04:10

## 2024-12-20 RX ADMIN — Medication 15 MILLILITER(S): at 05:45

## 2024-12-20 RX ADMIN — LEVETIRACETAM 1000 MILLIGRAM(S): 10 INJECTION, SOLUTION INTRAVENOUS at 12:46

## 2024-12-20 RX ADMIN — Medication 1 DROP(S): at 15:04

## 2024-12-20 RX ADMIN — Medication 1 DROP(S): at 19:51

## 2024-12-20 RX ADMIN — Medication 1 DROP(S): at 07:31

## 2024-12-20 RX ADMIN — Medication 1 APPLICATION(S): at 22:00

## 2024-12-20 RX ADMIN — ACETYLCYSTEINE 4 MILLILITER(S): 200 INHALANT RESPIRATORY (INHALATION) at 04:27

## 2024-12-20 RX ADMIN — Medication 1 DROP(S): at 12:46

## 2024-12-20 RX ADMIN — IPRATROPIUM BROMIDE AND ALBUTEROL SULFATE 3 MILLILITER(S): .5; 2.5 SOLUTION RESPIRATORY (INHALATION) at 09:59

## 2024-12-20 RX ADMIN — OXYCODONE HYDROCHLORIDE 5 MILLIGRAM(S): 30 TABLET ORAL at 15:03

## 2024-12-20 RX ADMIN — Medication 20 MILLIEQUIVALENT(S): at 06:44

## 2024-12-20 RX ADMIN — OFLOXACIN 1 DROP(S): 3 SOLUTION OPHTHALMIC at 12:46

## 2024-12-20 RX ADMIN — IPRATROPIUM BROMIDE AND ALBUTEROL SULFATE 3 MILLILITER(S): .5; 2.5 SOLUTION RESPIRATORY (INHALATION) at 18:28

## 2024-12-20 RX ADMIN — FLUTICASONE PROPIONATE 1 SPRAY(S): 50 SPRAY, METERED NASAL at 05:46

## 2024-12-20 RX ADMIN — OXYCODONE HYDROCHLORIDE 5 MILLIGRAM(S): 30 TABLET ORAL at 10:00

## 2024-12-20 RX ADMIN — DOXAZOSIN MESYLATE 8 MILLIGRAM(S): 8 TABLET ORAL at 22:51

## 2024-12-20 RX ADMIN — HEPARIN SODIUM 5000 UNIT(S): 1000 INJECTION INTRAVENOUS; SUBCUTANEOUS at 05:45

## 2024-12-20 NOTE — PROGRESS NOTE ADULT - SUBJECTIVE AND OBJECTIVE BOX
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 s/p peg 10/21. Prolonged hospital stay inability to transfer to Rehab due to lack of insurance.   On 12/15 AM while in stepdown unit patient became tachycardic HR 120s and 10 minutes later O2 sat dropped as low as 89%. Patient suctioned without improvement in O2 sat and tube feeds found in suction catheter. TFs held.  STAT CXR ordered. STAT labs sent. Respiratory therapist called to bedside and patient trach connected to ventilator. After connection to ventilator and further suctioning O2 sat improved to 97% but patient HR remains 120-130s. Upgraded to NSICU for further management.        ICU Vital Signs Last 24 Hrs  T(C): 36.7 (20 Dec 2024 18:28), Max: 36.8 (19 Dec 2024 22:18)  T(F): 98.1 (20 Dec 2024 18:28), Max: 98.3 (19 Dec 2024 22:18)  HR: 77 (20 Dec 2024 18:05) (67 - 103)  BP: 135/100 (20 Dec 2024 18:00) (104/70 - 148/94)  BP(mean): 113 (20 Dec 2024 18:00) (82 - 121)  RR: 14 (20 Dec 2024 18:05) (14 - 19)  SpO2: 100% (20 Dec 2024 18:05) (100% - 100%)      12-19-24 @ 07:01  -  12-20-24 @ 07:00  --------------------------------------------------------  IN: 1935 mL / OUT: 1307 mL / NET: 628 mL    12-20-24 @ 07:01  -  12-20-24 @ 19:17  --------------------------------------------------------  IN: 685 mL / OUT: 825 mL / NET: -140 mL      Mode: AC/ CMV (Assist Control/ Continuous Mandatory Ventilation), RR (machine): 14, TV (machine): 400, FiO2: 40, PEEP: 6, PS: 0, ITime: 1, MAP: 8.4, PIP: 17      PHYSICAL EXAM:  Gen: Awake, trach to vent  Neurological exam   Mental status: Awake, attend, oriented x 2 with choices by looking down, does not fully track but able to do so with left eye,  (EOMI impaired on R), following commands  CV: S1/S2, RRR  Lungs: Decreased breath sounds bilaterally, no wheezing  Abdomen: Bowel sounds  Extremities:  No edema      MEDICATIONS:   acetaminophen   Oral Liquid .. 650 milliGRAM(s) Oral every 6 hours PRN  acetylcysteine 10%  Inhalation 4 milliLiter(s) Inhalation every 6 hours  albuterol/ipratropium for Nebulization 3 milliLiter(s) Nebulizer every 6 hours  amLODIPine   Tablet 5 milliGRAM(s) Oral daily  artificial tears (preservative free) Ophthalmic Solution 1 Drop(s) Right EYE every 4 hours  chlorhexidine 0.12% Liquid 15 milliLiter(s) Oral Mucosa every 12 hours  chlorhexidine 2% Cloths 1 Application(s) Topical <User Schedule>  doxazosin 8 milliGRAM(s) Oral at bedtime  erythromycin   Ointment 1 Application(s) Right EYE at bedtime  fluticasone propionate 50 MICROgram(s)/spray Nasal Spray 1 Spray(s) Both Nostrils two times a day  heparin   Injectable 5000 Unit(s) SubCutaneous every 8 hours  levETIRAcetam  Solution 1000 milliGRAM(s) Oral every 12 hours  ofloxacin 0.3% Solution 1 Drop(s) Right EYE four times a day  oxyCODONE    Solution 5 milliGRAM(s) Oral every 4 hours PRN  pantoprazole  Injectable 40 milliGRAM(s) IV Push daily  petrolatum Ophthalmic Ointment 1 Application(s) Both EYES two times a day  sodium chloride 0.65% Nasal 1 Spray(s) Both Nostrils two times a day      LABS:                       10.5   6.83  )-----------( 176      ( 20 Dec 2024 04:42 )             32.3     12-20    143  |  107  |  22  ----------------------------<  142[H]  3.8   |  25  |  1.25    Ca    8.7      20 Dec 2024 04:42  Phos  3.0     12-20  Mg     1.9     12-20        ASSESSMENT AND PLAN  45 y/o PMH ?stroke/MI   Admitted with 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   S/P trach 10/14. s/p peg 10/21. Re-upgrade to ICU 2/2 desaturation event and suctioning requirements 11/15. Re-upgrade again on 12/15 for desaturation event.      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 DC  CT head 12/18 stable  Activity: PT/OT     CV; Tachycardia in the setting of hypoxia - (PE? hypovolemia?/hypoxia/respiratory distress?). RESOLVED.  CTA chest PE no PE in major vessels  -160. Vitals Q2  Continue amlodipine 5mg  TTE (9/30) EF 75%. Repeat TTE reviewed   Troponin; 51-> 75->53    RESPIRATION: Acute hypoxic respiratory failure requiring mechanical ventilation.   Full vent support/lung protective ventilation. CPAP/ SIMV as tolerated  Aggressive chest PT/duonebs, aggressive pulmonary toilet  CTA chest PA study negative for PE in major vessels; did show near-complete to complete LLL atelectasis. Partial RLL atelectasis    GI  TF via PEG at goal  Bowel regimen, last BM 12/19  PPI ppx  Abdominal Xray negative for ileus    RENAL: ANIKA on CKD. Resolved  Urine retention: Replaced gabriel catheter 12/15  Free water flushes  CKD: Trend BUN/Cr    ENDO:  A1c 5.4  Fingersticks, ISS    HEME:  DVT ppx: SCDs, SQH 5000u q8h   LE dopplers negative 11/11  CTA chest PA study negative for PE    ID: Tachycardic/hypoxic, aspiration event. Suspect aspiration PNA  S/P Vanc. On Zosyn to complete course  Blood cultures NGTD. Sputum stenotrophomonas, GPC/GNR.      MISC:  Ophthalmology consulted for keratitis  Erythromycin ointment to rt eye q4hrs, ofloxacin ointment to rt eye QID, artificial tears to rt eye q2hrs, moisture chamber at bedtime    MISC:    SOCIAL/FAMILY:  [] awaiting [x] updated at bedside [] family meeting    CODE STATUS:  [x] Full Code [] DNR [] DNI [] Palliative/Comfort Care    DISPOSITION:  [] ICU [] Stroke Unit [] Floor [] EMU [] RCU [] PCU                               NSCU ATTENDING -- ADDITIONAL PROGRESS NOTE    Nighttime rounds were performed       HPI   Patient is a 43 y/o male with PMH ?stroke/MI initially admitted on 9/30 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 s/p peg 10/21. Prolonged hospital stay inability to transfer to Rehab due to lack of insurance.   On 12/15 AM while in stepdown unit patient became tachycardic HR 120s and 10 minutes later O2 sat dropped as low as 89%. Patient suctioned without improvement in O2 sat and tube feeds found in suction catheter. TFs held.  STAT CXR ordered. STAT labs sent. Respiratory therapist called to bedside and patient trach connected to ventilator. After connection to ventilator and further suctioning O2 sat improved to 97% but patient HR remains 120-130s. Upgraded to NSICU for further management.      ICU Vital Signs Last 24 Hrs  T(C): 36.7 (20 Dec 2024 18:28), Max: 36.8 (19 Dec 2024 22:18)  T(F): 98.1 (20 Dec 2024 18:28), Max: 98.3 (19 Dec 2024 22:18)  HR: 77 (20 Dec 2024 18:05) (67 - 103)  BP: 135/100 (20 Dec 2024 18:00) (104/70 - 148/94)  BP(mean): 113 (20 Dec 2024 18:00) (82 - 121)  RR: 14 (20 Dec 2024 18:05) (14 - 19)  SpO2: 100% (20 Dec 2024 18:05) (100% - 100%)      12-19-24 @ 07:01  -  12-20-24 @ 07:00  --------------------------------------------------------  IN: 1935 mL / OUT: 1307 mL / NET: 628 mL    12-20-24 @ 07:01  -  12-20-24 @ 19:17  --------------------------------------------------------  IN: 685 mL / OUT: 825 mL / NET: -140 mL      Mode: AC/ CMV (Assist Control/ Continuous Mandatory Ventilation), RR (machine): 14, TV (machine): 400, FiO2: 40, PEEP: 6, PS: 0, ITime: 1, MAP: 8.4, PIP: 17      PHYSICAL EXAM:  Gen: Awake, trach to vent  Neurological exam   Mental status: Awake, attend, oriented x 2 with choices by looking down, does not fully track but able to do so with left eye,  (EOMI impaired on R), following commands  CV: S1/S2, RRR  Lungs: Decreased breath sounds bilaterally, no wheezing  Abdomen: Bowel sounds  Extremities:  No edema      MEDICATIONS:   acetaminophen   Oral Liquid .. 650 milliGRAM(s) Oral every 6 hours PRN  acetylcysteine 10%  Inhalation 4 milliLiter(s) Inhalation every 6 hours  albuterol/ipratropium for Nebulization 3 milliLiter(s) Nebulizer every 6 hours  amLODIPine   Tablet 5 milliGRAM(s) Oral daily  artificial tears (preservative free) Ophthalmic Solution 1 Drop(s) Right EYE every 4 hours  chlorhexidine 0.12% Liquid 15 milliLiter(s) Oral Mucosa every 12 hours  chlorhexidine 2% Cloths 1 Application(s) Topical <User Schedule>  doxazosin 8 milliGRAM(s) Oral at bedtime  erythromycin   Ointment 1 Application(s) Right EYE at bedtime  fluticasone propionate 50 MICROgram(s)/spray Nasal Spray 1 Spray(s) Both Nostrils two times a day  heparin   Injectable 5000 Unit(s) SubCutaneous every 8 hours  levETIRAcetam  Solution 1000 milliGRAM(s) Oral every 12 hours  ofloxacin 0.3% Solution 1 Drop(s) Right EYE four times a day  oxyCODONE    Solution 5 milliGRAM(s) Oral every 4 hours PRN  pantoprazole  Injectable 40 milliGRAM(s) IV Push daily  petrolatum Ophthalmic Ointment 1 Application(s) Both EYES two times a day  sodium chloride 0.65% Nasal 1 Spray(s) Both Nostrils two times a day      LABS:                       10.5   6.83  )-----------( 176      ( 20 Dec 2024 04:42 )             32.3     12-20    143  |  107  |  22  ----------------------------<  142[H]  3.8   |  25  |  1.25    Ca    8.7      20 Dec 2024 04:42  Phos  3.0     12-20  Mg     1.9     12-20        ASSESSMENT AND PLAN  43 y/o PMH ?stroke/MI   Admitted with 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   S/P trach 10/14. S/P PEG 10/21. Re-upgrade to ICU 2/2 desaturation event and suctioning requirements 11/15. Re-upgrade again on 12/15 for desaturation event.      NEURO: Locked in syndrome, rule out seizures  Neurochecks Q4h  Analgesia: Tylenol prn  vEEG; No seizures. DC  CT head 12/18 stable  Activity: PT/OT     CV: Tachycardia in the setting of hypoxia - (PE? hypovolemia?/hypoxia/respiratory distress?). RESOLVED.  CTA chest PE no PE in major vessels  -160.   Continue amlodipine 5mg  TTE (9/30) EF 75%. Repeat TTE reviewed.  Troponin; 51-> 75->53    RESPIRATION: Acute hypoxic respiratory failure requiring mechanical ventilation.   Full vent support/lung protective ventilation. CPAP/ SIMV as tolerated  Aggressive chest PT/duonebs, aggressive pulmonary toilet  CTA chest PA study negative for PE in major vessels; did show near-complete to complete LLL atelectasis. Partial RLL atelectasis    GI  TF via PEG at goal  Bowel regimen, last BM 12/19  PPI ppx  Abdominal Xray negative for ileus    RENAL: ANIKA on CKD. Resolved  Urine retention: Replaced gabriel catheter 12/15  Free water flushes  CKD: Trend BUN/Cr    ENDO:  A1c 5.4  Fingersticks, ISS    HEME:  DVT ppx: SCDs, SQH 5000u q8h   LE dopplers negative 11/11  CTA chest PA study negative for PE    ID: Tachycardic/hypoxic, aspiration event. Suspect aspiration PNA  S/P Vanc. On Zosyn to complete course  Blood cultures NGTD. Sputum stenotrophomonas, GPC/GNR.    MISC:  Ophthalmology consulted for keratitis  Erythromycin ointment to rt eye q4hrs, ofloxacin ointment to rt eye QID, artificial tears to rt eye q2hrs, moisture chamber at bedtime    MISC:    SOCIAL/FAMILY:  [] awaiting [x] updated at bedside [] family meeting    CODE STATUS:  [x] Full Code [] DNR [] DNI [] Palliative/Comfort Care    DISPOSITION:  [] ICU [] Stroke Unit [] Floor [] EMU [] RCU [] PCU    Not critically ill. Stepdown.

## 2024-12-20 NOTE — PROGRESS NOTE ADULT - SUBJECTIVE AND OBJECTIVE BOX
S/Overnight events:    Patient seen and examined in NSICU. Unable to elicit any complaints at this time.     Hospital Course:   : transferred from [facility]. A line placed. Versed dc'd. Cy Rader at bedside, states pt has family in [place], cannot confirm medications or PMH other than stroke and MI. 250cc bolus 3% given. LR switched to NS. hydralazine 25q8 started, 3% started, switched propofol to precedex   10/1: stability CTH done. Added labetalol, started TF. Palliative consulted. ethics consulted to determine surrogate. febrile 103, pan cx sent  10/2: BD 2, GEORGE overnight. TF resumed. Desatt'd to 80s, FiO2 inc. to 50. Fentanyl given, ABG, CXR ordered. Maxxed on precedex, started on propofol for DARIEN -4 - -5. Precedex dc'd. Duonebs, mucomyst, hypertonic added. 3% dc'd. Cardene dc'd. Start vanc/CTX. Increased labetalol 200q8. MRSA negative, dc'd vanc. ETT pulled back 2cm x 2, good positioning after confirmatory chest xray. Ethics attempting to establish HCP with family. Na 159, starting FW 250q6 for range 150-155.   10/3: BD3, GEORGE o/n, neuro stable. Na elevating, FW increased to 300q6. Dc'd bowel reg for diarrhea. vEEG started. SQH 5000q8 tonight.   10/4: BD 4, albumn bolus, incr. LR to 80 2/2 incr. in Cr, LR to 100cc/hr for uptrending Cr. Started 7% hypersal for 48hrs and SL atropine for inline/oral thick secretions. Dc'd CTX and started ancef for MSSA in the sputum. Nephrology consulted for CKD, f/u recs. SBP 170s, given hydralazine 10mg IVP.   10/5: BD5, o/n 10mg IVP hydralazine given for SBP 170s and started on hydralazine 25q8 via OGT. 10mg IV push labetalol for SBP > 160s. RT placed for diarrhea.   10/6: BD6, o/n FW increased to 350q4 per nephrology recs. IV tylenol for temp 100.6, SBp 160s presumed uncomfortable.   10/7: BD7, overnight pancultured for temp 101.8F.   10/8: BD8. GEORGE. Cr bumped. decreased LR to 75cc/hr. Adding simethicone ATC. incr hydralazine 50mgTID. Incr labetalol 300mgTID. Na 145, decreased FWF to 250q6. Start precedex. FENa consistent with intrinsic kidney injury. Pend repeat renal US. Retaining up to 1.3L, bladder scans q6, straight cath PRN  10/9: BD 9. GEORGE overnight. Neuro stable. abd xray for distention w non-specific gas pattern, OGT to LIWS for morning. duonebs/mucomyst to q8 for improving secretions. Changed tube feeds to Jevity 1.5 20cc/hr, low rate due to abdominal distention, nepro dense and more difficult to digest. Tolerating CPAP, confirmed by ABG.   10/10: BD 10. GEORGE overnight. Neuro stable. (+) gabriel for urinary retention on bladder scan. inc TF to goal rate of 40cc/hr. family leaning toward pursuing trach/PEG. 2 amp for FS 81.   10/11: BD 11. GEORGE overnight. Neuro stable. Trach/PEG consults placed.   10/12: BD 12. GEORGE overnight. Neuro stable. MRI brain complete.   10/13: BD 13. Increase flomax. Hold SQH after PM dose for trach tm. IVL.   10/14: BD 14. GEORGE overnight, remains on AC/VC. Gabriel placed for urinary retention. Dc'd free water.  S/p trach with pulm. NGT placed and CXR confirmed in good position.   10/15: BD 15, GEORGE ovn. resumed feeds. spiked 101, pan cx sent.   10/16: BD 16. GEORGE ovn. Lokelma 5mg for K+ 5.4. Started vanc q 24/zosyn for empiric PNA coverage, IVF to 100/hr. PEG held for fever.   10/17: BD 17,  ordered serum osm and urine osm for am. Started sinemet for neurostimulation. Increased cardura to 0.8. Started FW 100q4, dc'd IVF. MRSA negative, dc'd vanc. NGT replaced d/t coiling.   10/18: BD 18, GEORGE overnight, neuro stable. Amantadine added for neurostim. zosyn changed to unasyn for acinetobacter baumannii, failed TOV and required SC  10/19: BD 19, GEORGE ovn. cardura 2mg added for retention. labetalol decreased 200q8, hydralazine decreased 25q8. Gabriel replaced.   10/20: BD20, GEORGE overnight. NGT dislodged, replaced. PEG tomorrow w/ gen surg, FW increased to 150q4 and labetalol decreased to 100q8, lokelma given for hyperkalemia.   10/21: BD 21. POD0 PEG placement with Gen surg. decr labetolol to 50q8, incr. cardura to 0.4, started lokelma and phoslo, dc gabriel POD0 PEG placement with Gen surg.  10/22: BD 22. Plan to start TF today via PEG. dc labetalol, Following ophtho recs. Increased apnea settings - found to be in cheyne-moe respiration. CPAP .  10/23: BD 23. hydralazine d/c'd, trach collar trial today. Rectal tube placed at 6am.  10/24: BD24, o/n lokelma held due to diarrhea. Free water 100q6 resumed. dc'd tamsulosin, amantadine. Incr'd cardura to 8mg qhs. Dc'd FW. Switched jevity to nepro. gabriel placed for high urine output. Started SL atropine for oral secretions. Dc'd free water.  10/25: BD25, o/n decreased suctioning requirements to > q4hrs, GEORGE. Cr improving, cont phoslo, lokelma held at this time. Gabriel placed yest, cont. Tolerating trach collar. Given 500cc plasmalyte bolus for ANIKA. Dc'd sinemet.   10/26: BD26, o/n resumed lokelma 5mg daily and resumed 100cc free water q6hrs. Change in neuro status with new right pupillary dilation with anisocoria (right pupil 6mm fixed and left pupil 3mm briskly reactive). Given 23.4% NaCl bullet, taken for emergent CTH showing mostly resolved pontine hemorrhage, continued brainstem hypodensity likely edema d/t hemorrhage, no new hemorrhage or infarct, no herniation, mild increase in size of left lateral ventricle. Vitals remaine stable. Na goal > 140.   10/27: BD27, o/n GEORGE.Neuro stable. Pend stepdown with airway bed.   10/28: BD 28. GEORGE overnight. Neuro stable. Miralax ordered. Gabriel removed, pending TOV.  10/29: BD 29. GEORGE o/n. Given 2L NS over 8 hrs for increased BUN/Cr ratio. Gabriel placed for frequent straight cath.   10/30: BD 30.   10/31: BD 31. GEORGE overnight. Na 149, increased free water to 200q6. 1L NS for uptrending BUN.   : BD 32. GEORGE overnight. Given 1L NS for dehydration. Na 146, increased FW to 250q6.   : BD 33 GEORGE overnight, neuro stable, given 500cc bolus for net negative status and tachycardia   11/3: BD 34, GEORGE overnight, neuro stable. Patient remains tachycardic, EKG showing sinus tachycardia, given additional 500cc NS bolus. Febrile to 101.9F, pan cultured (without UA), CXR WNL, given tylenol.   : BD 35, GEORGE overnight, neuro stable. Given 1L NS for tachycardia. sputum (+) for stenotropohomas maltophilia.   : BD 36 GEORGE overnight, neuro stable. Vancomycin dc'd. Chest PT BID. ID consulted, cont zosyn.  : BD 37. blood cx + klebsiella dc zosyn changed to cefepime, CTAP ordered, rpt blood cx sent.    : BD 38. Pending CT A/P, given 250cc bolus and starting maintenance fluids overnight. Pending CT A/P after bolus   : BD 39. CT CAP negative for infection.   : BD 40. GEORGE overnight.  11/10: BD 41. GEORGE overnight. desat to 85 on trach collar, O2 inc to 10L and 100%, O2 sat inc to 95. pt tachy to 110s, euvolemic. given tylenol. ABG and CXR ordered. spiked fever, pancultured, RVP negative. AM ABG w pO2 79, rpt w pO2 79. pt appears comfortable, satting 94%.   : BD 42. GEORGE overnight. pt became tachy to 130s, desat to 90 on 100% FiO2 and 10L. suctioned, (+) productive cough. temp 101.4, given 1g IV tylenol and 500cc NS bolus for euvolemia. fever and HR downtrending. LE dopplers negative for dvt  : BD 43, GEORGE ovn, fever and HR downtrending, satting 97% 70% FIO2  : BD 44, GEORGE ovn. started standing tylenol x24 hours for tachycardia. desat to 80s, o2 increased. CXR stable, pending CTA PE protocol.   : BD 45, GEORGE overnight, neuro stable. resp therapy dec FiO2 to 70%.   11/15: BD 46, GEORGE overnight, neuro stable.  Rapid called for desaturation 30s, tachycardic 140s. Patient bagged, 100% fio2, heavily suctioned. CXR/POCUS unremarkable. ABG c/w desaturation. WBC 14.71. Afebrile. O2 improved to 90s and patient upgraded to ICU. ABG paO2 30s improved to 89 on vent. IV Tylenol x 1, sputum sent. Start protonix while o-n vent.   : BD 47. POCUS showed collapsable IVCF, given 1L bolus. Vanco/Cefpime added empriic for PNA, NGT feeds restarted. MRSA swab neg, Vanc DC'd.   : BD 48. GEORGE overnight. 1l bolus for tachycardia. Spiked to 101, cultured. 500cc bolus for tachycardia, tachy to 148 given 25mcg fentanyl, 250cc albumin, 1.5L bolus. 5 IV lopressor with response HR to 100s. +Stenotrophomonas on sputum cx.   : BD 49. GEORGE overnight. Consulted ID, cefepime switched to bactrim x7days. Started hydrochlorothiazine 12.5mg daily.  : BD 50. Tachy 120s, given tylenol and 500cc NS. Tolerating 5/5, switched to TCx. Holding phos binder. D/c Bactrim. D/c nery, f/u TOV. Dc'd PPI.   : BD 51. GEORGE ovn. 1600 satting low 90s, mildly tachy to 110s, afebrile, RR wnl. O2 improved to mid 90s while inc O2 to 100% on TCx. CPAP 5/5 placed back on.  : BD 52, GEORGE ovn, tolerating CPAP 5/5. Switch to trach collar during the day if tolerating well. HCTZ held for Cr bump, straight cath frequence increased to q4  : BD 53, GEORGE ovn. Resumed phoslo. Gabriel placed. Resumed HCTZ.   : BD 54. Holding tylenol in setting of possible fever, will require pan cx if febrile. Cr improved today. Cont CPAP. Bowel regimen held i/s/o diarrhea. FOBT negative.  : BD 55. GEORGE overnight. Neuro stable. HCTZ dc'ed, started lisinopril 5. Lokelma dc'ed for K 3.7.   : BD 56, GEORGE overnight. Neuro stable. dc'd lisinopril 5mg. Gabriel dc'd. TOV. 1545 noted to be hypotensive, MAP 50, in supine position on chair, HR 60s, afebrile, O2 96%. Given 1L cc NS bolus, placed back on bed in reverse trendelenberg, improved to map of 66. Neostick at bedside. Vitals check q1h. Dc'ed amlodipine. Failed TOV, bladder scan q6, sc prn. Added back Senna.   : BD 57, GEORGE overnight. Neuro stable. Dc'd phoslo.   : BD 58, GEORGE overnight. Neuro stable.    : BD 59. Gabriel replaced.   : GEORGE.  : GEORGE, neuro stable.   : BD 62, GEORGE overnight  : BD 63, GEORGE overnight.; Given 1L bolus of LR for uptrending BUN/Cr.  12/3: BD 64. Reinstated eye gtt/moisture chamber given increased Rt eye injection  : BD 65. GEORGE overnight. Attempted to speak with ophtho regarding eyelid weight/closure but no answer, full mailbox.   : BD 66, GEORGE overnight, bowel regimen increased and had BM.   : BD 67, GEORGE overnight, neuro stable.  : GEORGE overnight, neuro stable. /110s, given x1 hydralazine 10 mg IVP. Restarted home amlodipine 5mg.  : GEORGE. OOB to chair.     : GEOREG, mucomyst added for thick secretions, simethicone for abd distension, abd xray with stool burden, increased bowel regimen.   : GEORGE overnight.   : GEORGE overnight.   : GEORGE overnight  12/15: o/n Patient became tachycardic HR 120s and 10 minutes later O2 sat dropped as low as 89%. Patient suctioned without improvement in O2 sat and tube feeds found in suction catheter. TFs held.  STAT CXR ordered. STAT labs sent. Respiratory therapist called to bedside and patient trach connected to ventilator. After connection to ventilator and further suctioning O2 sat improved to 97% but patient HR remains 120-130s. Upgraded to NSICU for further management. Vancomycin and zosyn started. CTA  chest PE protocol and CTH ordered. Blood cultures sent.given 500cc bolus, rpt ABG sent pO2 243, CTH and CTA chest done. FS while NPO, FiO2 dec 50 pending ABG. sputum cx positive for few GPC and GNR.   : GEORGE overnight, restarted amlodipine, troponin 75   : GEORGE overnight, neuro stable. Attempted CPAP this morning, did not tolerate and back on full vent support. Dc'd Vanc. Tachycardia to 140s, noted extremities to be twitching along with jaw twitching. Given 2g of Keppra, total 4mg ativan and placed on EEG, full set of labs and lactate negative. Resumed trickle feeds at 20cc/hr. Given 250cc albumin for tachycardia.   : GEORGE overnight. Neuro stable. CTH stable. EEG negative and dc'd.   : GEORGE overnight. neuro stable. SIMV most of day, AC/VC at night.   : GEORGE overnight, neuro stable.       Vital Signs Last 24 Hrs  T(C): 36.4 (20 Dec 2024 00:25), Max: 37.1 (19 Dec 2024 14:17)  T(F): 97.5 (20 Dec 2024 00:25), Max: 98.7 (19 Dec 2024 14:17)  HR: 89 (20 Dec 2024 01:) (65 - 89)  BP: 114/82 (20 Dec 2024 01:) (114/82 - 156/80)  BP(mean): 95 (20 Dec 2024 01:00) (95 - 127)  RR: 14 (20 Dec 2024 01:00) (13 - 17)  SpO2: 100% (20 Dec 2024 01:00) (97% - 100%)    Parameters below as of 20 Dec 2024 01:00  Patient On (Oxygen Delivery Method): ventilator    O2 Concentration (%): 40    I&O's Detail    18 Dec 2024 07:01  -  19 Dec 2024 07:00  --------------------------------------------------------  IN:    Enteral Tube Flush: 60 mL    IV PiggyBack: 200 mL    Miscellaneous Tube Feedin mL    sodium chloride 0.9%: 75 mL  Total IN: 1175 mL    OUT:    Indwelling Catheter - Urethral (mL): 3100 mL  Total OUT: 3100 mL    Total NET: -1925 mL      19 Dec 2024 07:01  -  20 Dec 2024 01:43  --------------------------------------------------------  IN:    Enteral Tube Flush: 250 mL    IV PiggyBack: 250 mL    Miscellaneous Tube Feedin mL  Total IN: 1400 mL    OUT:    Indwelling Catheter - Urethral (mL): 1045 mL  Total OUT: 1045 mL    Total NET: 355 mL        I&O's Summary    18 Dec 2024 07:01  -  19 Dec 2024 07:00  --------------------------------------------------------  IN: 1175 mL / OUT: 3100 mL / NET: -1925 mL    19 Dec 2024 07:01  -  20 Dec 2024 01:43  --------------------------------------------------------  IN: 1400 mL / OUT: 1045 mL / NET: 355 mL      PHYSICAL EXAM:  General: NAD, pt is sitting up in bed, on AC/VC 40/400/14/6  HEENT: PERRL 3mm briskly reactive, EOM b/l vertically tracking and will cross to left, face appears symmetric  CV: RRR, S1, S2  Resp: breathing non-labored on AC/VC, chest rise symmetric  GI: abd soft, NTND  Neuro: AOx3 w eyebrow movement, intermittently follows commands on right side and will open/close mouth to command  RUE shows 2 fingers to command, Right tricep 3/5; LUE extends to Right tricep movement o/w 0/5; RLE wiggles toes to command. LLE w/d to noxious.   Unable to assess sensation 2/2 mental status   Extremities: distal pulses 2+ x4.  Wound/incision: n/a  Drain: (+) PEG (+) gabriel (+) trach    TUBES/LINES:  [] CVC  [] A-line  [] Lumbar Drain  [] Ventriculostomy  [x] Gabriel  [x] Other- PEG, trach    DIET:  [] NPO  [] Mechanical  [x] Tube feeds    LABS:                        10.1   4.53  )-----------( 194      ( 19 Dec 2024 05:30 )             31.8     12-    143  |  107  |  18  ----------------------------<  113[H]  3.4[L]   |  24  |  1.14    Ca    9.0      19 Dec 2024 05:30  Phos  3.0     12-19  Mg     2.1     12-19        Urinalysis Basic - ( 19 Dec 2024 05:30 )    Color: x / Appearance: x / SG: x / pH: x  Gluc: 113 mg/dL / Ketone: x  / Bili: x / Urobili: x   Blood: x / Protein: x / Nitrite: x   Leuk Esterase: x / RBC: x / WBC x   Sq Epi: x / Non Sq Epi: x / Bacteria: x          CAPILLARY BLOOD GLUCOSE          Drug Levels: [] N/A  Vancomycin Level, Trough: 37.3 ug/mL ( @ 16:23)    CSF Analysis: [] N/A      Allergies    Allergy Status Unknown    Intolerances      MEDICATIONS:  Antibiotics:  piperacillin/tazobactam IVPB.. 3.375 Gram(s) IV Intermittent every 8 hours    Neuro:  acetaminophen   Oral Liquid .. 650 milliGRAM(s) Oral every 6 hours PRN  levETIRAcetam  Solution 1000 milliGRAM(s) Oral every 12 hours    Anticoagulation:  heparin   Injectable 5000 Unit(s) SubCutaneous every 8 hours    OTHER:  acetylcysteine 10%  Inhalation 4 milliLiter(s) Inhalation every 6 hours  albuterol/ipratropium for Nebulization 3 milliLiter(s) Nebulizer every 6 hours  amLODIPine   Tablet 5 milliGRAM(s) Oral daily  artificial tears (preservative free) Ophthalmic Solution 1 Drop(s) Right EYE every 4 hours  chlorhexidine 0.12% Liquid 15 milliLiter(s) Oral Mucosa every 12 hours  chlorhexidine 2% Cloths 1 Application(s) Topical <User Schedule>  doxazosin 8 milliGRAM(s) Oral at bedtime  erythromycin   Ointment 1 Application(s) Right EYE at bedtime  fluticasone propionate 50 MICROgram(s)/spray Nasal Spray 1 Spray(s) Both Nostrils two times a day  ofloxacin 0.3% Solution 1 Drop(s) Right EYE four times a day  pantoprazole  Injectable 40 milliGRAM(s) IV Push daily  petrolatum Ophthalmic Ointment 1 Application(s) Both EYES two times a day  sodium chloride 0.65% Nasal 1 Spray(s) Both Nostrils two times a day    IVF:    CULTURES:  Culture Results:   Moderate Stenotrophomonas maltophilia  Commensal iram consistent with body site (12-15 @ 05:06)  Culture Results:   No growth at 4 days (12-15 @ 01:03)    RADIOLOGY & ADDITIONAL TESTS:      ASSESSMENT:  45 y/o PMH ?stroke/MI present to [facility] after collapsing at work. Decorticate posturing, vomiting, intubated for airway protection. 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 Transferred to [facility] for further management. s/p trach 10/14. s/p peg 10/21. Re-upgrade to ICU 2/2 desaturation event and suctioning requirements 11/15.     AMS  Handoff  MEWS Score  Acute myocardial infarction  CVA (cerebral vascular accident)  Intracerebral hemorrhage of brain stem  Brainstem stroke  Brain stem stroke syndrome  Brain stem hemorrhage  Brain stem stroke syndrome  Hemorrhagic stroke  Brainstem stroke  Encephalopathy acute  Functional quadriplegia  Advanced care planning/counseling discussion  Encounter for palliative care  Pontine hemorrhage  Neurogenic dysphagia  Chronic respiratory failure  Acute kidney injury superimposed on CKD  Acute urinary retention  Hypertensive emergency  Sepsis, unspecified organism  Sepsis  Gram-negative bacteremia  Percutaneous tracheal puncture  Altered mental status examination  EGD, with PEG  AMS  [email or web address]        PLAN:  Neuro:  - neuro checks q4/vitals q1h  - pain control: tylenol prn  - seizure tx: keppra 1000mg BID   - vEEG (10/3-) negative, (10/17-10/19) negative, (-) negative  - CTH : enlarged pontine hemorrhage, CTH 10/3: stable, CTH 10/25: mostly resolved pontine hemorrhage, CTH 12/15: R mastoid air cell opacification; acute otitis media vs sterile effusion, CTH : stable.  - MRI brain 10/12: parenchymal hemorrhage, acute/subacute R cerebellar stroke    - stroke core measures, stroke neuro signed off    CV:  - -160  - HTN: amlodipine 5mg, hold lisinopril 5mg   - echo () EF 75%, repeat : 57%    Resp:  - trach to vent, AC/VC 40/400/14/6, CPAP as tolerated  - CTA PE protocol 12/15 negative   - Secretions: duonebs/mucomyst/chest PT q6h     GI:  - TF via PEG (placed 10/21 by gen surg)  - bowel regimen held for loose stool, last BM     Renal:  - IVL  - Urinary retenion: Gabriel replaced 12/15  - CKD: trend BUN/Cr  - renal US 10/1: echogenicity c/w chronic med renal dz, repeat 10/8: inc renal echogeicity, c/f medical renal disease w increased hydro     Endo:  - A1c 5.4    Heme:  - DVT ppx: SCDs, SQH 5000u q8h   - LE dopplers negative     ID:  - empiric PNA: zosyn (12/15-), s/p vanc (12/15-)  - blood and sputum cultures 12/15  - last pancx , MRSA swab neg    - s/p Stenotrophomonas maltophilia PNA: s/p Bactrim (-) s/p Cefepime (-)  - 11/3, (+) sputum for stenotrophomas maltophlia, blood cx (+) klebsiella, cefepime 2gq12 ( - )   - empiric tx: s/p zosyn (11/3-) , s/p vanc  (11/3-)  - S/p Ancef (10/4-10/14) for PNA, and s/p Unasyn (10/18-10/23) +actinobacter baumanii     MISC:  - ophtho consult for keratitis  - erythromycin ointment R eye q4hrs, ofloxacin ointment R eye QID, artificial tears R eye q2hrs, moisture chamber at bedtime    Dispo: ICU status, full code, pending placement and guardianship hearing     D/w Dr. D'Amico and Dr. Reveles  Assessment:  Present when checked    []  GCS  E   V  M     Heart Failure: []Acute, [] acute on chronic , []chronic  Heart Failure:  [] Diastolic (HFpEF), [] Systolic (HFrEF), []Combined (HFpEF and HFrEF), [] RHF, [] Pulm HTN, [] Other    [] ANIKA, [] ATN, [] AIN, [] other  [] CKD1, [] CKD2, [] CKD 3, [] CKD 4, [] CKD 5, []ESRD    Encephalopathy: [] Metabolic, [] Hepatic, [] toxic, [] Neurological, [] Other    Abnormal Nurtitional Status: [] malnurtition (see nutrition note), [ ]underweight: BMI < 19, [] morbid obesity: BMI >40, [] Cachexia    [] Sepsis  [] hypovolemic shock,[] cardiogenic shock, [] hemorrhagic shock, [] neuogenic shock  [] Acute Respiratory Failure  []Cerebral edema, [] Brain compression/ herniation,   [] Functional quadriplegia  [] Acute blood loss anemia

## 2024-12-20 NOTE — PROGRESS NOTE ADULT - SUBJECTIVE AND OBJECTIVE BOX
=================================  NEUROCRITICAL CARE ATTENDING NOTE  =================================    GARETT DELEON   MRN-[id number]  Summary:  44y/M  unknown past medical history (reported stroke and MI by coworkers) presented to [facility] with AMS, Pt was working at [employer] when he was found down by coworkers. EMS called and pt brought to [facility] ED. Intubated, sedated, started on cardene for SBPs in 200s. CT head showed brain stem bleed. Transferred to NSICU for further management.  (30 Sep 2024 12:55)    COURSE IN THE HOSPITAL:  Date	POD	BD	Events	Surgery  	-	-	Transferred from [facility]. A-line placed. Versed discontinued. Hydralazine, 3% saline started. Changed propofol to precedex.	-  10/1	-	-	Stability CTH done. Added labetalol, started TF. Palliative and ethics consulted. Febrile 103°F, pan cultures sent.	-  10/2	-		Desaturation to 80s, FiO2 increased. Fentanyl given, ABG, CXR ordered. Propofol started. Vanc/CTX started. MRSA negative, vanc discontinued. Na 159, starting FW 250q6.	-  10/3	-	3	Na increasing, FW increased to 300q6. Bowel regimen discontinued. vEEG started.	-  10/4	-		Albumin bolus, increased LR. 7% hypersal started for 48hrs. Nephrology consulted for CKD. SBP 170s, hydralazine given.	-  10/5	-		Hydralazine and labetalol for SBP. RT placed for diarrhea.	-  10/6	-		FW increased to 350q4. IV Tylenol for temp 100.6°F.	-  10/7	-		Pancultured for temp 101.8°F.	-  10/8	-		Cr increase, LR decreased. Simethicone added. Increased hydralazine, labetalol. Na 145, FW 250q6. Retaining fluid, bladder scans ordered.	-  10/9	-		Abd x-ray for distention. Changed tube feeds to Jevity 1.5. Tolerating CPAP.	-  10/10	-	10	Vuong for urinary retention. Increased TF to goal. Family leaning toward trach/PEG.	-  10/11	-		Trach/PEG consults placed.	-  10/12	-		MRI brain complete.	-  10/13	-	13	Increased flomax. Hold SQH for trach.	-  10/14	-		S/p trach with pulmonary. NGT placed, CXR confirmed position.	-  10/15	-	15	Resumed feeds. Spiked fever 101°F, pan cultures sent.	-  10/16	-	16	Lokelma for K+ 5.4. Started vanc/zosyn for PNA. PEG held for fever.	-  10/17	-	17	Serum and urine osm ordered. Started sinemet, FW 100q4, IVF discontinued. MRSA negative, vanc discontinued. NGT replaced.	-  10/18	-	18	Amantadine added. Changed zosyn to unasyn for acinetobacter baumannii. Failed TOV, required SC.	-  10/19	-	19	Cardura added for retention. Decreased labetalol, hydralazine. Vuong replaced.	-  10/20	-	20	NGT dislodged, replaced. PEG scheduled for tomorrow. Lokelma for hyperkalemia.  10/21	-	21	s/p PEG  10/22	-	22	No significant events overnight. 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  trach collar 40%  10/24   -	24	No significant events overnight.   10/25   -	25	No significant events overnight.   10/26   -	26	Anisocoria overnight, given 23.4% x1, CT stable; briefly back on vent, ABG unremarkable, now back on trach collar; exam - following commands!  10/27   -	27	No significant events overnight.     Date	POD	BD	Events	  10/19		19	Cardura 2mg added for retention. Labetalol decreased. Vuong replaced.	  10/20		20	NGT dislodged, replaced. PEG placement scheduled for tomorrow. Free water increased. Labetalol decreased. Lokelma given for hyperkalemia.	  10/21	0	21	PEG placement with Gen Surg. Labetalol decreased. Cardura increased. Started Lokelma and Phoslo. Vuong discontinued.	PEG placement with Gen Surg  10/22		22	Plan to start tube feeding via PEG. Labetalol discontinued. CPAP 5/ for Cheyne-Vaughn respiration.	  10/23		23	Hydralazine discontinued. Trach collar trial. Rectal tube placed.	  10/24		24	Lokelma held for diarrhea. Cardura increased. Jevity switched to Nepro. Vuong placed for high urine output.	  10/25		25	Change in neuro status: anisocoria. Emergent CTH: resolved pontine hemorrhage, continued edema.	  10/26		26	Lokelma and free water resumed. 23.4% NaCl given. Emergent CTH: resolved pontine hemorrhage, continued edema.	  10/27		27	Neuro stable.	  10/28		28	Neuro stable. Miralax ordered. Vuong removed, pending TOV.	  10/29		29	2L NS over 8 hrs for increased BUN/Cr ratio. Vuong placed.	  10/31		31	Na 149, increased free water to 200q6. 1L NS for uptrending BUN.	  		32	1L NS for dehydration. Na 146, increased FW to 250q6.	  		33	Neuro stable. 500cc bolus for tachycardia.	  11/3		34	Neuro stable. Febrile to 101.9°F, pan cultured. 500cc NS bolus for sinus tachycardia.	  		35	Neuro stable. 1L NS for tachycardia. Sputum (+) for Stenotrophomonas.	  		36	Neuro stable. Vancomycin discontinued. Chest PT BID. ID consulted. Continue Zosyn.	  		37	Blood cx (+) for Klebsiella. Zosyn changed to Cefepime. CTAP ordered.	  		38	Maintenance fluids started. Pending CT A/P.	  		39	CT CAP negative for infection.	  11/10		41	Desat to 85 on trach collar, O2 increased. Sat 94%. Fever, pancultured. AM ABG pO2 79.	  		42	Tachycardia 130s, desat to 90 on 10L. Cough, temp 101.4, given 1g IV Tylenol and 500cc NS. Fever and HR downtrending.	  		43	Fever and HR downtrending, satting 97% on 70% FIO2.	  		44	Standing Tylenol for tachycardia. Desat to 80s, O2 increased. Pending CTA PE protocol.	  		45	Neuro stable. Resp therapy decreased FiO2 to 70%.	  11/15		46	Rapid called for desat 30s, tachycardia 140s. Upgraded to ICU. Vent started. POCUS and CXR unremarkable. ABG improved.	  		47	POCUS showed collapsible IVC, given 1L bolus. Vanco/Cefepime started for PNA. MRSA swab negative, Vanco discontinued.	  		48	1L bolus for tachycardia. Fever 101, cultured. 1.5L bolus, 5 IV Lopressor. Stenotrophomonas on sputum cx.	  		49	Consulted ID, Cefepime switched to Bactrim x7 days. Started Hydrochlorothiazine 12.5mg daily.	  		50	Tachycardia 120s, given Tylenol and 500cc NS. Tolerating 5/5, switched to TCx. Bactrim discontinued. Vuong discontinued.	  		51	Satting low 90s, mildly tachycardic to 110s. O2 improved on TCx. CPAP 5/5 placed back on.	  		52	Tolerating CPAP 5/5. HCTZ held for Cr bump, straight cath increased.	  		53	Resumed Phoslo. Vuong placed. Resumed HCTZ.	  		54	Holding Tylenol for possible fever, pan culture if febrile. Cr improved. Cont. CPAP.	  		55	Neuro stable. HCTZ dc'd, started Lisinopril 5. Lokelma dc'd for K 3.7.	  		56	Neuro stable. hypotension to 60s SBP, given fluid bolus  : BD 57, GEORGE overnight. Neuro stable. Dc'd phoslo.   : BD 58, GEORGE overnight. Neuro stable.    : BD 59. Vuong replaced.   : GEORGE.  : GEORGE, neuro stable.   : BD 62, GEORGE overnight  : BD 63, GEORGE overnight.; Given 1L bolus of LR for uptrending BUN/Cr.  12/3: BD 64. Reinstated eye gtt/moisture chamber given increased Rt eye injection  : BD 65. GEORGE overnight. Attempted to speak with ophtho regarding eyelid weight/closure but no answer, full mailbox.   : BD 66, GEORGE overnight, bowel regimen increased and had BM.   : BD 67, GEORGE overnight, neuro stable.  : GEORGE overnight, neuro stable. /110s, given x1 hydralazine 10 mg IVP. Restarted home amlodipine 5mg.  : GEORGE. OOB to chair.     : GEORGE, mucomyst added for thick secretions, simethicone for abd distension, abd xray with stool burden, increased bowel regimen.   : GEORGE overnight.   : GEORGE overnight.   : GEORGE overnight  12/15: o/n Patient became tachycardic HR 120s and 10 minutes later O2 sat dropped as low as 89%. Patient suctioned without improvement in O2 sat and tube feeds found in suction catheter. TFs held.  STAT CXR ordered. STAT labs sent. Respiratory therapist called to bedside and patient trach connected to ventilator. After connection to ventilator and further suctioning O2 sat improved to 97% but patient HR remains 120-130s. Upgraded to NSICU for further management. Vancomycin and zosyn started. CTA  chest PE protocol and CTH ordered. Blood cultures sent.given 500cc bolus, rpt ABG sent pO2 243, CTH and CTA chest done. FS while NPO, FiO2 dec 50 pending ABG. sputum cx positive for few GPC and GNR.   : GEORGE overnight, restarted amlodipine, troponin 75   : GEORGE overnight, neuro stable, CPAP x2 hours 10/6, apneic back to full vent; OOB to chair, sudden tachycardia 140's, L facial twitching, still following commands, given 2G levetiracetam, ativan 2mg x2, fluid bolus, metoprolol, EEG    full vent support overnight, albumin   SIMV      Past Medical History: Acute myocardial infarction CVA (cerebral vascular accident)  Allergies:  Allergy Status Unknown  Home meds:     PHYSICAL EXAMINATION  T(C): 36.3 (24 @ 04:47), Max: 37.1 (24 @ 14:17) HR: 86 (24 @ 07:00) (65 - 103) BP: 148/94 (24 @ 07:00) (104/70 - 156/80) RR: 15 (24 @ 07:00) (13 - 19) SpO2: 100% (24 @ 07:00) (99% - 100%)  NEUROLOGIC EXAMINATION:  Patient awake, alert, Oriented x2-3 with choices, tracks, RANDELL, R triceps 3/5 L arm 0/5  B LE withdraws  GENERAL: full vent support 40% 400 14 +6  EENT:  anicteric,  CARDIOVASCULAR: (+) S1 S2, normal rate and regular rhythm  PULMONARY: clear to auscultation bilaterally (minimalsecretion)  ABDOMEN: soft, nontender with normoactive bowel sounds  EXTREMITIES: no edema  SKIN: no rash    LABS:     (4.53)  10.5 (10.1)  6.83  )-----------( 176      ( 20 Dec 2024 04:42 )             32.3     143  |  107  |  22  ----------------------------<  142[H]  3.8   |  25  |  1.25 (1.14)    Ca    8.7      20 Dec 2024 04:42  Phos  3.0       Mg     1.9      @ 07:01  -   @ 07:00  --------------------------------------------------------  IN: 1835 mL / OUT: 1307 mL / NET: 528 mL     @ 07:01  -   @ 07:34  --------------------------------------------------------  IN: 25 mL / OUT: 0 mL / NET: 25 mL        Bacteriology:  10/16 sputum culture acinetobacter  10/15 Blood CS NG5D    12/15 Sputum CS stenotrophomona    CSF studies:  EEG:  Neuroimagin2024 9:30 AM	CT Brain	Large pontine hemorrhage, SAH, edema, no hydrocephalus. Multiple old infarcts.  2024 1:11 PM	CTA Head/Neck	Pontine hemorrhage stable, no malformation, stable ventricular size, no stenosis or aneurysm.  2024 6:40 PM	CT Brain	Enlarged pontine hemorrhage, SAH in frontal lobes.  2024        	CT Brain	Stable pontine hemorrhage, SAH stable.  10/03/2024 11:06 AM	CT Brain	Stable hematoma, no rebleeding.  10/12/2024 9:16 AM	MRI Brain	Stable hemorrhage, no new bleeding, cerebellar infarct.    Other imagin2024 7:20PM	CTA CHEST	No PE to lobar arteries; aspiration in RLL with groundglass opacity.  2024 7:50PM	US DPLX LE	No DVT in either lower extremity.  2024 6:47AM	CT ABD/PELVIS	Atelectasis; can't exclude pneumonia; no abdominal/pelvic infection.  10/25/2024 12:56AM	CT BRAIN	Evolving pontine hemorrhage, no rebleeding, worsened R mastoid opacification.  10/19/2024 1:13PM	US DPLX LE	No DVT in either lower extremity.  10/01/2024 11:49 AM	US Retroperit	No hydronephrosis; chronic renal disease; Vuong catheter.  10/01/2024 11:14 AM	XR Abdomen	Orogastric tube coiled in stomach; nonspecific bowel gas pattern.  10/07/2024 10:25 PM	XR Abdomen	Nonspecific bowel gas pattern.  10/08/2024 8:01 AM	US Retroperit	Normal kidneys; distended bladder; mild hydronephrosis.  10/10/2024 7:32 AM	XR Abdomen	Nasogastric tube; nonspecific bowel gas pattern.  10/19/2024 1:13 PM	US DPLX Veins	No DVT in lower extremities.    MEDICATIONS: 12-20    ·	heparin   Injectable 5000 SubCutaneous every 8 hours  ·	levETIRAcetam  Solution 1000 Oral every 12 hours  ·	amLODIPine   Tablet 5 milliGRAM(s) Oral daily  ·	doxazosin 8 milliGRAM(s) Oral at bedtime  ·	acetylcysteine 10%  Inhalation 4 Inhalation every 6 hours  ·	albuterol/ipratropium for Nebulization 3 Nebulizer every 6 hours  ·	pantoprazole  Injectable 40 IV Push daily  ·	artificial tears (preservative free) Ophthalmic Solution 1 Right EYE every 4 hours  ·	erythromycin   Ointment 1 Right EYE at bedtime  ·	fluticasone propionate 50 MICROgram(s)/spray Nasal Spray 1 Both Nostrils two times a day  ·	ofloxacin 0.3% Solution 1 Right EYE four times a day  ·	petrolatum Ophthalmic Ointment 1 Both EYES two times a day  ·	sodium chloride 0.65% Nasal 1 Both Nostrils two times a day  ·	acetaminophen   Oral Liquid .. 650 Oral every 6 hours PRN     IV FLUIDS: IVL  DRIPS:  DIET: Maribeth Sauceda at goal  Lines:   Drains:    Vuong (changed 12/15)  Wounds:    CODE STATUS:  Full Code                       GOALS OF CARE:  aggressive                      DISPOSITION:  ICU  ICH Score 3 =================================  NEUROCRITICAL CARE ATTENDING NOTE  =================================    GARETT DELEON   MRN-[id number]  Summary:  44y/M  unknown past medical history (reported stroke and MI by coworkers) presented to [facility] with AMS, Pt was working at [employer] when he was found down by coworkers. EMS called and pt brought to [facility] ED. Intubated, sedated, started on cardene for SBPs in 200s. CT head showed brain stem bleed. Transferred to NSICU for further management.  (30 Sep 2024 12:55)    COURSE IN THE HOSPITAL:  Date	POD	BD	Events	Surgery  	-	-	Transferred from [facility]. A-line placed. Versed discontinued. Hydralazine, 3% saline started. Changed propofol to precedex.	-  10/1	-	-	Stability CTH done. Added labetalol, started TF. Palliative and ethics consulted. Febrile 103°F, pan cultures sent.	-  10/2	-		Desaturation to 80s, FiO2 increased. Fentanyl given, ABG, CXR ordered. Propofol started. Vanc/CTX started. MRSA negative, vanc discontinued. Na 159, starting FW 250q6.	-  10/3	-	3	Na increasing, FW increased to 300q6. Bowel regimen discontinued. vEEG started.	-  10/4	-		Albumin bolus, increased LR. 7% hypersal started for 48hrs. Nephrology consulted for CKD. SBP 170s, hydralazine given.	-  10/5	-		Hydralazine and labetalol for SBP. RT placed for diarrhea.	-  10/6	-		FW increased to 350q4. IV Tylenol for temp 100.6°F.	-  10/7	-		Pancultured for temp 101.8°F.	-  10/8	-		Cr increase, LR decreased. Simethicone added. Increased hydralazine, labetalol. Na 145, FW 250q6. Retaining fluid, bladder scans ordered.	-  10/9	-		Abd x-ray for distention. Changed tube feeds to Jevity 1.5. Tolerating CPAP.	-  10/10	-	10	Vuong for urinary retention. Increased TF to goal. Family leaning toward trach/PEG.	-  10/11	-		Trach/PEG consults placed.	-  10/12	-		MRI brain complete.	-  10/13	-	13	Increased flomax. Hold SQH for trach.	-  10/14	-		S/p trach with pulmonary. NGT placed, CXR confirmed position.	-  10/15	-	15	Resumed feeds. Spiked fever 101°F, pan cultures sent.	-  10/16	-	16	Lokelma for K+ 5.4. Started vanc/zosyn for PNA. PEG held for fever.	-  10/17	-	17	Serum and urine osm ordered. Started sinemet, FW 100q4, IVF discontinued. MRSA negative, vanc discontinued. NGT replaced.	-  10/18	-	18	Amantadine added. Changed zosyn to unasyn for acinetobacter baumannii. Failed TOV, required SC.	-  10/19	-	19	Cardura added for retention. Decreased labetalol, hydralazine. Vuong replaced.	-  10/20	-	20	NGT dislodged, replaced. PEG scheduled for tomorrow. Lokelma for hyperkalemia.  10/21	-	21	s/p PEG  10/22	-	22	No significant events overnight. 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  trach collar 40%  10/24   -	24	No significant events overnight.   10/25   -	25	No significant events overnight.   10/26   -	26	Anisocoria overnight, given 23.4% x1, CT stable; briefly back on vent, ABG unremarkable, now back on trach collar; exam - following commands!  10/27   -	27	No significant events overnight.     Date	POD	BD	Events	  10/19		19	Cardura 2mg added for retention. Labetalol decreased. Vuong replaced.	  10/20		20	NGT dislodged, replaced. PEG placement scheduled for tomorrow. Free water increased. Labetalol decreased. Lokelma given for hyperkalemia.	  10/21	0	21	PEG placement with Gen Surg. Labetalol decreased. Cardura increased. Started Lokelma and Phoslo. Vuong discontinued.	PEG placement with Gen Surg  10/22		22	Plan to start tube feeding via PEG. Labetalol discontinued. CPAP 5/ for Cheyne-Vaughn respiration.	  10/23		23	Hydralazine discontinued. Trach collar trial. Rectal tube placed.	  10/24		24	Lokelma held for diarrhea. Cardura increased. Jevity switched to Nepro. Vuong placed for high urine output.	  10/25		25	Change in neuro status: anisocoria. Emergent CTH: resolved pontine hemorrhage, continued edema.	  10/26		26	Lokelma and free water resumed. 23.4% NaCl given. Emergent CTH: resolved pontine hemorrhage, continued edema.	  10/27		27	Neuro stable.	  10/28		28	Neuro stable. Miralax ordered. Vuong removed, pending TOV.	  10/29		29	2L NS over 8 hrs for increased BUN/Cr ratio. Vuong placed.	  10/31		31	Na 149, increased free water to 200q6. 1L NS for uptrending BUN.	  		32	1L NS for dehydration. Na 146, increased FW to 250q6.	  		33	Neuro stable. 500cc bolus for tachycardia.	  11/3		34	Neuro stable. Febrile to 101.9°F, pan cultured. 500cc NS bolus for sinus tachycardia.	  		35	Neuro stable. 1L NS for tachycardia. Sputum (+) for Stenotrophomonas.	  		36	Neuro stable. Vancomycin discontinued. Chest PT BID. ID consulted. Continue Zosyn.	  		37	Blood cx (+) for Klebsiella. Zosyn changed to Cefepime. CTAP ordered.	  		38	Maintenance fluids started. Pending CT A/P.	  		39	CT CAP negative for infection.	  11/10		41	Desat to 85 on trach collar, O2 increased. Sat 94%. Fever, pancultured. AM ABG pO2 79.	  		42	Tachycardia 130s, desat to 90 on 10L. Cough, temp 101.4, given 1g IV Tylenol and 500cc NS. Fever and HR downtrending.	  		43	Fever and HR downtrending, satting 97% on 70% FIO2.	  		44	Standing Tylenol for tachycardia. Desat to 80s, O2 increased. Pending CTA PE protocol.	  		45	Neuro stable. Resp therapy decreased FiO2 to 70%.	  11/15		46	Rapid called for desat 30s, tachycardia 140s. Upgraded to ICU. Vent started. POCUS and CXR unremarkable. ABG improved.	  		47	POCUS showed collapsible IVC, given 1L bolus. Vanco/Cefepime started for PNA. MRSA swab negative, Vanco discontinued.	  		48	1L bolus for tachycardia. Fever 101, cultured. 1.5L bolus, 5 IV Lopressor. Stenotrophomonas on sputum cx.	  		49	Consulted ID, Cefepime switched to Bactrim x7 days. Started Hydrochlorothiazine 12.5mg daily.	  		50	Tachycardia 120s, given Tylenol and 500cc NS. Tolerating 5/5, switched to TCx. Bactrim discontinued. Vuong discontinued.	  		51	Satting low 90s, mildly tachycardic to 110s. O2 improved on TCx. CPAP 5/5 placed back on.	  		52	Tolerating CPAP 5/5. HCTZ held for Cr bump, straight cath increased.	  		53	Resumed Phoslo. Vuong placed. Resumed HCTZ.	  		54	Holding Tylenol for possible fever, pan culture if febrile. Cr improved. Cont. CPAP.	  		55	Neuro stable. HCTZ dc'd, started Lisinopril 5. Lokelma dc'd for K 3.7.	  		56	Neuro stable. hypotension to 60s SBP, given fluid bolus  : BD 57, GEORGE overnight. Neuro stable. Dc'd phoslo.   : BD 58, GEORGE overnight. Neuro stable.    : BD 59. Vuong replaced.   : GEORGE.  : GEORGE, neuro stable.   : BD 62, GEORGE overnight  : BD 63, GEORGE overnight.; Given 1L bolus of LR for uptrending BUN/Cr.  12/3: BD 64. Reinstated eye gtt/moisture chamber given increased Rt eye injection  : BD 65. GEORGE overnight. Attempted to speak with ophtho regarding eyelid weight/closure but no answer, full mailbox.   : BD 66, GEORGE overnight, bowel regimen increased and had BM.   : BD 67, GEORGE overnight, neuro stable.  : GEORGE overnight, neuro stable. /110s, given x1 hydralazine 10 mg IVP. Restarted home amlodipine 5mg.  : GEORGE. OOB to chair.     : GEORGE, mucomyst added for thick secretions, simethicone for abd distension, abd xray with stool burden, increased bowel regimen.   : GEORGE overnight.   : GEORGE overnight.   : GEORGE overnight  12/15: o/n Patient became tachycardic HR 120s and 10 minutes later O2 sat dropped as low as 89%. Patient suctioned without improvement in O2 sat and tube feeds found in suction catheter. TFs held.  STAT CXR ordered. STAT labs sent. Respiratory therapist called to bedside and patient trach connected to ventilator. After connection to ventilator and further suctioning O2 sat improved to 97% but patient HR remains 120-130s. Upgraded to NSICU for further management. Vancomycin and zosyn started. CTA  chest PE protocol and CTH ordered. Blood cultures sent.given 500cc bolus, rpt ABG sent pO2 243, CTH and CTA chest done. FS while NPO, FiO2 dec 50 pending ABG. sputum cx positive for few GPC and GNR.   : GEORGE overnight, restarted amlodipine, troponin 75   : GEORGE overnight, neuro stable, CPAP x2 hours 10/6, apneic back to full vent; OOB to chair, sudden tachycardia 140's, L facial twitching, still following commands, given 2G levetiracetam, ativan 2mg x2, fluid bolus, metoprolol, EEG    full vent support overnight, albumin   SIMV   full AC overnight due to low volumes    Past Medical History: Acute myocardial infarction CVA (cerebral vascular accident)  Allergies:  Allergy Status Unknown  Home meds:     PHYSICAL EXAMINATION  T(C): 36.3 (24 @ 04:47), Max: 37.1 (24 @ 14:17) HR: 86 (24 @ 07:00) (65 - 103) BP: 148/94 (24 @ 07:00) (104/70 - 156/80) RR: 15 (24 @ 07:00) (13 - 19) SpO2: 100% (24 @ 07:00) (99% - 100%)  NEUROLOGIC EXAMINATION:  Patient awake, alert, Oriented x2-3 with choices, tracks, RANDELL, R triceps 3/5 L arm 0/5  B LE withdraws  GENERAL: full vent support 40% 400 14 +6  EENT:  anicteric,  CARDIOVASCULAR: (+) S1 S2, normal rate and regular rhythm  PULMONARY: clear to auscultation bilaterally (minimal secretion)  ABDOMEN: soft, nontender with normoactive bowel sounds  EXTREMITIES: no edema  SKIN: no rash    LABS:     (4.53)  10.5 (10.1)  6.83  )-----------( 176      ( 20 Dec 2024 04:42 )             32.3     143  |  107  |  22  ----------------------------<  142[H]  3.8   |  25  |  1.25 (1.14)    Ca    8.7      20 Dec 2024 04:42  Phos  3.0       Mg     1.9      @ 07: @ 07:00  --------------------------------------------------------  IN: 1835 mL / OUT: 1307 mL / NET: 528 mL     @ 07:01  -   @ 07:34  --------------------------------------------------------  IN: 25 mL / OUT: 0 mL / NET: 25 mL        Bacteriology:  10/16 sputum culture acinetobacter  10/15 Blood CS NG5D    12/15 Sputum CS stenotrophomona    CSF studies:  EEG:  Neuroimagin2024 9:30 AM	CT Brain	Large pontine hemorrhage, SAH, edema, no hydrocephalus. Multiple old infarcts.  2024 1:11 PM	CTA Head/Neck	Pontine hemorrhage stable, no malformation, stable ventricular size, no stenosis or aneurysm.  2024 6:40 PM	CT Brain	Enlarged pontine hemorrhage, SAH in frontal lobes.  2024        	CT Brain	Stable pontine hemorrhage, SAH stable.  10/03/2024 11:06 AM	CT Brain	Stable hematoma, no rebleeding.  10/12/2024 9:16 AM	MRI Brain	Stable hemorrhage, no new bleeding, cerebellar infarct.    Other imagin2024 7:20PM	CTA CHEST	No PE to lobar arteries; aspiration in RLL with groundglass opacity.  2024 7:50PM	US DPLX LE	No DVT in either lower extremity.  2024 6:47AM	CT ABD/PELVIS	Atelectasis; can't exclude pneumonia; no abdominal/pelvic infection.  10/25/2024 12:56AM	CT BRAIN	Evolving pontine hemorrhage, no rebleeding, worsened R mastoid opacification.  10/19/2024 1:13PM	US DPLX LE	No DVT in either lower extremity.  10/01/2024 11:49 AM	US Retroperit	No hydronephrosis; chronic renal disease; Vuong catheter.  10/01/2024 11:14 AM	XR Abdomen	Orogastric tube coiled in stomach; nonspecific bowel gas pattern.  10/07/2024 10:25 PM	XR Abdomen	Nonspecific bowel gas pattern.  10/08/2024 8:01 AM	US Retroperit	Normal kidneys; distended bladder; mild hydronephrosis.  10/10/2024 7:32 AM	XR Abdomen	Nasogastric tube; nonspecific bowel gas pattern.  10/19/2024 1:13 PM	US DPLX Veins	No DVT in lower extremities.    MEDICATIONS: 12-20    ·	heparin   Injectable 5000 SubCutaneous every 8 hours  ·	levETIRAcetam  Solution 1000 Oral every 12 hours  ·	amLODIPine   Tablet 5 milliGRAM(s) Oral daily  ·	doxazosin 8 milliGRAM(s) Oral at bedtime  ·	acetylcysteine 10%  Inhalation 4 Inhalation every 6 hours  ·	albuterol/ipratropium for Nebulization 3 Nebulizer every 6 hours  ·	pantoprazole  Injectable 40 IV Push daily  ·	artificial tears (preservative free) Ophthalmic Solution 1 Right EYE every 4 hours  ·	erythromycin   Ointment 1 Right EYE at bedtime  ·	fluticasone propionate 50 MICROgram(s)/spray Nasal Spray 1 Both Nostrils two times a day  ·	ofloxacin 0.3% Solution 1 Right EYE four times a day  ·	petrolatum Ophthalmic Ointment 1 Both EYES two times a day  ·	sodium chloride 0.65% Nasal 1 Both Nostrils two times a day  ·	acetaminophen   Oral Liquid .. 650 Oral every 6 hours PRN     IV FLUIDS: IVL  DRIPS:  DIET: Maribeth Sauceda at goal  Lines:   Drains:    Vuong (changed 12/15)  Wounds:    CODE STATUS:  Full Code                       GOALS OF CARE:  aggressive                      DISPOSITION:  ICU  ICH Score 3

## 2024-12-20 NOTE — PROGRESS NOTE ADULT - ASSESSMENT
44y/M with  large pontine hemorrhage, SAH, brain edema; ?locked-in  CVA  h/o MI  acute on chronic CKD, hydronephrosis    PLAN:   NEURO: neurochecks q4 VSq1h, PRN pain meds   neurostim: off   palliative / ethics follow-up  cont levetiracetam 1G BID   REHAB:  physical therapy evaluation and management    EARLY MOB:  bedrest HOB up    PULM:  SIMV 12 pulmo toilette, standing ipratropium / albuterol / mucomyst; chest PT, HOB up  CARDIO:  SBP goal 100-160mm Hg, EF 75%  ENDO:  Blood sugar goals 140-180 mg/dL, A1C 5.4, off ISS  GI:  off bowel regimen (diarrhea)  DIET: TF banatrol - increase to goal  RENAL:  Na goal 135-145  HEM/ONC:  Hb stable  VTE Prophylaxis: SCDs, SQH  ID: afebrile, no leukocytosis, piperacillin/tazobactam until 12/20  Social: will update family    Active issues:  What's keeping patient in the ICU? vent   What is this patient's dispo plan? stepdown once stable    ATTENDING ATTESTATION:  Patient at high risk for neurological deterioration or death due to:  ICU delirium, aspiration PNA, DVT / PE.  Critical care time:  I have personally provided 60 minutes of critical care time, excluding time spent on separate procedures.      Plan discussed with RN, house staff.   44y/M with  large pontine hemorrhage, SAH, brain edema; ?locked-in  CVA  h/o MI  acute on chronic CKD, hydronephrosis    PLAN:   NEURO: neurochecks q4 VSq1h, PRN pain meds   neurostim: off   palliative / ethics follow-up  cont levetiracetam 1G BID   REHAB:  physical therapy evaluation and management    EARLY MOB:  bedrest HOB up    PULM:  full AC, standing ipratropium / albuterol / mucomyst; chest PT, HOB up  CARDIO:  SBP goal 100-160mm Hg, EF 75%  ENDO:  Blood sugar goals 140-180 mg/dL, A1C 5.4, off ISS  GI:  off bowel regimen (diarrhea)  DIET: TF banatrol to goal  RENAL:  Na goal 135-145  HEM/ONC:  Hb stable  VTE Prophylaxis: SCDs, SQH  ID: afebrile, no leukocytosis, piperacillin/tazobactam until 12/20  Social: will update family    Active issues:  What's keeping patient in the ICU? vent   What is this patient's dispo plan? stepdown once stable    ATTENDING ATTESTATION:  Patient at high risk for neurological deterioration or death due to:  ICU delirium, aspiration PNA, DVT / PE.  Critical care time:  I have personally provided 60 minutes of critical care time, excluding time spent on separate procedures.      Plan discussed with RN, house staff.

## 2024-12-21 LAB
ANION GAP SERPL CALC-SCNC: 10 MMOL/L — SIGNIFICANT CHANGE UP (ref 5–17)
BUN SERPL-MCNC: 23 MG/DL — SIGNIFICANT CHANGE UP (ref 7–23)
CALCIUM SERPL-MCNC: 8.5 MG/DL — SIGNIFICANT CHANGE UP (ref 8.4–10.5)
CHLORIDE SERPL-SCNC: 104 MMOL/L — SIGNIFICANT CHANGE UP (ref 96–108)
CO2 SERPL-SCNC: 23 MMOL/L — SIGNIFICANT CHANGE UP (ref 22–31)
CREAT SERPL-MCNC: 1.18 MG/DL — SIGNIFICANT CHANGE UP (ref 0.5–1.3)
EGFR: 77 ML/MIN/1.73M2 — SIGNIFICANT CHANGE UP
EGFR: 77 ML/MIN/1.73M2 — SIGNIFICANT CHANGE UP
GLUCOSE SERPL-MCNC: 111 MG/DL — HIGH (ref 70–99)
HCT VFR BLD CALC: 32.3 % — LOW (ref 39–50)
HGB BLD-MCNC: 10.1 G/DL — LOW (ref 13–17)
MAGNESIUM SERPL-MCNC: 1.8 MG/DL — SIGNIFICANT CHANGE UP (ref 1.6–2.6)
MCHC RBC-ENTMCNC: 29.9 PG — SIGNIFICANT CHANGE UP (ref 27–34)
MCHC RBC-ENTMCNC: 31.3 G/DL — LOW (ref 32–36)
MCV RBC AUTO: 95.6 FL — SIGNIFICANT CHANGE UP (ref 80–100)
NRBC # BLD: 0 /100 WBCS — SIGNIFICANT CHANGE UP (ref 0–0)
NRBC BLD-RTO: 0 /100 WBCS — SIGNIFICANT CHANGE UP (ref 0–0)
PHOSPHATE SERPL-MCNC: 3.8 MG/DL — SIGNIFICANT CHANGE UP (ref 2.5–4.5)
PLATELET # BLD AUTO: 197 K/UL — SIGNIFICANT CHANGE UP (ref 150–400)
POTASSIUM SERPL-MCNC: 4 MMOL/L — SIGNIFICANT CHANGE UP (ref 3.5–5.3)
POTASSIUM SERPL-SCNC: 4 MMOL/L — SIGNIFICANT CHANGE UP (ref 3.5–5.3)
RBC # BLD: 3.38 M/UL — LOW (ref 4.2–5.8)
RBC # FLD: 15 % — HIGH (ref 10.3–14.5)
SODIUM SERPL-SCNC: 137 MMOL/L — SIGNIFICANT CHANGE UP (ref 135–145)
WBC # BLD: 7.41 K/UL — SIGNIFICANT CHANGE UP (ref 3.8–10.5)
WBC # FLD AUTO: 7.41 K/UL — SIGNIFICANT CHANGE UP (ref 3.8–10.5)

## 2024-12-21 PROCEDURE — 71045 X-RAY EXAM CHEST 1 VIEW: CPT | Mod: 26

## 2024-12-21 PROCEDURE — 99232 SBSQ HOSP IP/OBS MODERATE 35: CPT

## 2024-12-21 RX ORDER — ACETAMINOPHEN 500 MG/5ML
1000 LIQUID (ML) ORAL ONCE
Refills: 0 | Status: COMPLETED | OUTPATIENT
Start: 2024-12-21 | End: 2024-12-21

## 2024-12-21 RX ORDER — MAGNESIUM SULFATE 500 MG/ML
2 SYRINGE (ML) INJECTION ONCE
Refills: 0 | Status: COMPLETED | OUTPATIENT
Start: 2024-12-21 | End: 2024-12-21

## 2024-12-21 RX ADMIN — IPRATROPIUM BROMIDE AND ALBUTEROL SULFATE 3 MILLILITER(S): .5; 2.5 SOLUTION RESPIRATORY (INHALATION) at 21:46

## 2024-12-21 RX ADMIN — IPRATROPIUM BROMIDE AND ALBUTEROL SULFATE 3 MILLILITER(S): .5; 2.5 SOLUTION RESPIRATORY (INHALATION) at 10:34

## 2024-12-21 RX ADMIN — ACETYLCYSTEINE 4 MILLILITER(S): 200 INHALANT RESPIRATORY (INHALATION) at 10:35

## 2024-12-21 RX ADMIN — HEPARIN SODIUM 5000 UNIT(S): 1000 INJECTION INTRAVENOUS; SUBCUTANEOUS at 15:17

## 2024-12-21 RX ADMIN — OFLOXACIN 1 DROP(S): 3 SOLUTION OPHTHALMIC at 17:20

## 2024-12-21 RX ADMIN — Medication 1000 MILLILITER(S): at 06:12

## 2024-12-21 RX ADMIN — Medication 1 SPRAY(S): at 17:20

## 2024-12-21 RX ADMIN — Medication 1 DROP(S): at 15:23

## 2024-12-21 RX ADMIN — Medication 1 DROP(S): at 21:58

## 2024-12-21 RX ADMIN — Medication 15 MILLILITER(S): at 06:13

## 2024-12-21 RX ADMIN — Medication 1 DROP(S): at 00:08

## 2024-12-21 RX ADMIN — Medication 1000 MILLIGRAM(S): at 07:30

## 2024-12-21 RX ADMIN — Medication 25 GRAM(S): at 12:19

## 2024-12-21 RX ADMIN — AMLODIPINE BESYLATE 5 MILLIGRAM(S): 10 TABLET ORAL at 06:13

## 2024-12-21 RX ADMIN — Medication 1 DROP(S): at 17:21

## 2024-12-21 RX ADMIN — ACETYLCYSTEINE 4 MILLILITER(S): 200 INHALANT RESPIRATORY (INHALATION) at 21:46

## 2024-12-21 RX ADMIN — LEVETIRACETAM 1000 MILLIGRAM(S): 10 INJECTION, SOLUTION INTRAVENOUS at 23:44

## 2024-12-21 RX ADMIN — HEPARIN SODIUM 5000 UNIT(S): 1000 INJECTION INTRAVENOUS; SUBCUTANEOUS at 21:46

## 2024-12-21 RX ADMIN — LEVETIRACETAM 1000 MILLIGRAM(S): 10 INJECTION, SOLUTION INTRAVENOUS at 12:20

## 2024-12-21 RX ADMIN — Medication 1 SPRAY(S): at 06:15

## 2024-12-21 RX ADMIN — Medication 400 MILLIGRAM(S): at 06:15

## 2024-12-21 RX ADMIN — HEPARIN SODIUM 5000 UNIT(S): 1000 INJECTION INTRAVENOUS; SUBCUTANEOUS at 06:13

## 2024-12-21 RX ADMIN — FLUTICASONE PROPIONATE 1 SPRAY(S): 50 SPRAY, METERED NASAL at 17:20

## 2024-12-21 RX ADMIN — Medication 1 DROP(S): at 10:40

## 2024-12-21 RX ADMIN — IPRATROPIUM BROMIDE AND ALBUTEROL SULFATE 3 MILLILITER(S): .5; 2.5 SOLUTION RESPIRATORY (INHALATION) at 00:31

## 2024-12-21 RX ADMIN — Medication 1 APPLICATION(S): at 06:30

## 2024-12-21 RX ADMIN — FLUTICASONE PROPIONATE 1 SPRAY(S): 50 SPRAY, METERED NASAL at 06:14

## 2024-12-21 RX ADMIN — IPRATROPIUM BROMIDE AND ALBUTEROL SULFATE 3 MILLILITER(S): .5; 2.5 SOLUTION RESPIRATORY (INHALATION) at 15:18

## 2024-12-21 RX ADMIN — ACETYLCYSTEINE 4 MILLILITER(S): 200 INHALANT RESPIRATORY (INHALATION) at 15:19

## 2024-12-21 RX ADMIN — ACETYLCYSTEINE 4 MILLILITER(S): 200 INHALANT RESPIRATORY (INHALATION) at 06:13

## 2024-12-21 RX ADMIN — OFLOXACIN 1 DROP(S): 3 SOLUTION OPHTHALMIC at 06:14

## 2024-12-21 RX ADMIN — Medication 1 APPLICATION(S): at 17:22

## 2024-12-21 RX ADMIN — OFLOXACIN 1 DROP(S): 3 SOLUTION OPHTHALMIC at 12:22

## 2024-12-21 RX ADMIN — DOXAZOSIN MESYLATE 8 MILLIGRAM(S): 8 TABLET ORAL at 21:46

## 2024-12-21 RX ADMIN — OFLOXACIN 1 DROP(S): 3 SOLUTION OPHTHALMIC at 00:34

## 2024-12-21 RX ADMIN — ERYTHROMYCIN 1 APPLICATION(S): 5 OINTMENT OPHTHALMIC at 21:58

## 2024-12-21 RX ADMIN — ACETYLCYSTEINE 4 MILLILITER(S): 200 INHALANT RESPIRATORY (INHALATION) at 00:32

## 2024-12-21 RX ADMIN — Medication 1 APPLICATION(S): at 06:15

## 2024-12-21 RX ADMIN — Medication 15 MILLILITER(S): at 17:20

## 2024-12-21 RX ADMIN — LEVETIRACETAM 1000 MILLIGRAM(S): 10 INJECTION, SOLUTION INTRAVENOUS at 00:32

## 2024-12-21 RX ADMIN — Medication 1 DROP(S): at 06:14

## 2024-12-21 RX ADMIN — Medication 40 MILLIGRAM(S): at 12:20

## 2024-12-21 RX ADMIN — IPRATROPIUM BROMIDE AND ALBUTEROL SULFATE 3 MILLILITER(S): .5; 2.5 SOLUTION RESPIRATORY (INHALATION) at 06:13

## 2024-12-21 RX ADMIN — OFLOXACIN 1 DROP(S): 3 SOLUTION OPHTHALMIC at 23:49

## 2024-12-21 NOTE — PROGRESS NOTE ADULT - SUBJECTIVE AND OBJECTIVE BOX
INTERVAL EVENTS: No o/n events.     REVIEW OF SYSTEMS:  Constitutional:     [ ] negative [ ] fevers [ ] chills [ ] weight loss [ ] weight gain  HEENT:                  [ ] negative [ ] dry eyes [ ] eye irritation [ ] postnasal drip [ ] nasal congestion  CV:                         [ ] negative  [ ] chest pain [ ] orthopnea [ ] palpitations [ ] murmur  Resp:                     [ ] negative [ ] cough [ ] shortness of breath [ ] wheezing [ ] sputum [ ] hemoptysis  GI:                          [ ] negative [ ] nausea [ ] vomiting [ ] diarrhea [ ] constipation [ ] abd pain [ ] dysphagia   :                        [ ] negative [ ] dysuria [ ] nocturia [ ] hematuria [ ] increased urinary frequency  MSK:                      [ ] negative [ ] back pain [ ] myalgias [ ] arthralgias [ ] fracture  Skin:                       [ ] negative [ ] rash [ ] itch  Neuro:                   [ ] negative [ ] headache [ ] dizziness [ ] syncope [ ] weakness [ ] numbness  Psych:                    [ ] negative [ ] anxiety [ ] depression  Endo:                     [ ] negative [ ] diabetes [ ] thyroid problem  Heme/Lymph:      [ ] negative [ ] anemia [ ] bleeding problem  Allergic/Immune: [ ] negative [ ] itchy eyes [ ] nasal discharge [ ] hives [ ] angioedema    [ ] All other systems negative or otherwise described above.  [X] Unable to assess ROS due to critical illness    PAST MEDICAL & SURGICAL HISTORY:  Acute myocardial infarction    CVA (cerebral vascular accident)      MEDICATIONS  (STANDING):  acetylcysteine 10%  Inhalation 4 milliLiter(s) Inhalation every 6 hours  albuterol/ipratropium for Nebulization 3 milliLiter(s) Nebulizer every 6 hours  amLODIPine   Tablet 5 milliGRAM(s) Oral daily  artificial tears (preservative free) Ophthalmic Solution 1 Drop(s) Right EYE every 4 hours  chlorhexidine 0.12% Liquid 15 milliLiter(s) Oral Mucosa every 12 hours  chlorhexidine 2% Cloths 1 Application(s) Topical <User Schedule>  doxazosin 8 milliGRAM(s) Oral at bedtime  erythromycin   Ointment 1 Application(s) Right EYE at bedtime  fluticasone propionate 50 MICROgram(s)/spray Nasal Spray 1 Spray(s) Both Nostrils two times a day  heparin   Injectable 5000 Unit(s) SubCutaneous every 8 hours  levETIRAcetam  Solution 1000 milliGRAM(s) Oral every 12 hours  ofloxacin 0.3% Solution 1 Drop(s) Right EYE four times a day  pantoprazole  Injectable 40 milliGRAM(s) IV Push daily  petrolatum Ophthalmic Ointment 1 Application(s) Both EYES two times a day  sodium chloride 0.65% Nasal 1 Spray(s) Both Nostrils two times a day    MEDICATIONS  (PRN):  acetaminophen   Oral Liquid .. 650 milliGRAM(s) Oral every 6 hours PRN Temp greater or equal to 38.5C (101.3F), Mild Pain (1 - 3)  oxyCODONE    Solution 5 milliGRAM(s) Oral every 4 hours PRN Moderate Pain (4 - 6)    ICU Vital Signs Last 24 Hrs  T(C): 37.3 (21 Dec 2024 08:41), Max: 37.3 (21 Dec 2024 08:41)  T(F): 99.1 (21 Dec 2024 08:41), Max: 99.1 (21 Dec 2024 08:41)  HR: 108 (21 Dec 2024 12:50) (73 - 128)  BP: 122/86 (21 Dec 2024 12:15) (121/85 - 146/106)  BP(mean): 98 (21 Dec 2024 12:15) (94 - 122)  ABP: --  ABP(mean): --  RR: 14 (21 Dec 2024 12:50) (14 - 18)  SpO2: 98% (21 Dec 2024 12:50) (95% - 100%)    O2 Parameters below as of 21 Dec 2024 12:50  Patient On (Oxygen Delivery Method): ventilator    O2 Concentration (%): 40      Orthostatic VS    Daily     Daily   I&O's Summary    20 Dec 2024 07:01  -  21 Dec 2024 07:00  --------------------------------------------------------  IN: 1785 mL / OUT: 1775 mL / NET: 10 mL    21 Dec 2024 07:01  -  21 Dec 2024 13:13  --------------------------------------------------------  IN: 490 mL / OUT: 240 mL / NET: 250 mL        PHYSICAL EXAM:  General: comfortable-appearing, no WOB on trach collar  HEENT: tracheostomy clean  Lungs: no crackles, no wheezes  Heart: regular  Abdomen: soft, no visible tenderness, + BS  Extremities: warm, no edema, not moving to command      LABS:                        10.1   7.41  )-----------( 197      ( 21 Dec 2024 07:53 )             32.3       12-21    137  |  104  |  23  ----------------------------<  111[H]  4.0   |  23  |  1.18    Ca    8.5      21 Dec 2024 07:53  Phos  3.8     12-21  Mg     1.8     12-21            Urinalysis Basic - ( 21 Dec 2024 07:53 )    Color: x / Appearance: x / SG: x / pH: x  Gluc: 111 mg/dL / Ketone: x  / Bili: x / Urobili: x   Blood: x / Protein: x / Nitrite: x   Leuk Esterase: x / RBC: x / WBC x   Sq Epi: x / Non Sq Epi: x / Bacteria: x          RADIOLOGY & ADDITIONAL STUDIES: Reviewed

## 2024-12-21 NOTE — PROGRESS NOTE ADULT - SUBJECTIVE AND OBJECTIVE BOX
HPI:  Unknown age male, unknown past medical history (reported stroke and MI by coworkers) presented to [facility] with AMS, Pt was working at [employer] when he was found down by coworkers. EMS called and pt brought to [facility] ED. Intubated, sedated, started on cardene for SBPs in 200s. CT head showed brain stem bleed. Transferred to NSICU for further management.  (30 Sep 2024 12:55)    INTERVAL EVENTS: GEORGE ovn, remains on VC/AC    Hospital course:   9/30: transferred from [facility]. A line placed. Versed dc'd. Cy Rader at bedside, states pt has family in [place], cannot confirm medications or PMH other than stroke and MI. 250cc bolus 3% given. LR switched to NS. hydralazine 25q8 started, 3% started, switched propofol to precedex   10/1: stability CTH done. Added labetalol, started TF. Palliative consulted. ethics consulted to determine surrogate. febrile 103, pan cx sent  10/2: BD 2, GEORGE overnight. TF resumed. Desatt'd to 80s, FiO2 inc. to 50. Fentanyl given, ABG, CXR ordered. Maxxed on precedex, started on propofol for DARIEN -4 - -5. Precedex dc'd. Duonebs, mucomyst, hypertonic added. 3% dc'd. Cardene dc'd. Start vanc/CTX. Increased labetalol 200q8. MRSA negative, dc'd vanc. ETT pulled back 2cm x 2, good positioning after confirmatory chest xray. Ethics attempting to establish HCP with family. Na 159, starting FW 250q6 for range 150-155.   10/3: BD3, GEORGE o/n, neuro stable. Na elevating, FW increased to 300q6. Dc'd bowel reg for diarrhea. vEEG started. SQH 5000q8 tonight.   10/4: BD 4, albumn bolus, incr. LR to 80 2/2 incr. in Cr, LR to 100cc/hr for uptrending Cr. Started 7% hypersal for 48hrs and SL atropine for inline/oral thick secretions. Dc'd CTX and started ancef for MSSA in the sputum. Nephrology consulted for CKD, f/u recs. SBP 170s, given hydralazine 10mg IVP.   10/5: BD5, o/n 10mg IVP hydralazine given for SBP 170s and started on hydralazine 25q8 via OGT. 10mg IV push labetalol for SBP > 160s. RT placed for diarrhea.   10/6: BD6, o/n FW increased to 350q4 per nephrology recs. IV tylenol for temp 100.6, SBp 160s presumed uncomfortable.   10/7: BD7, overnight pancultured for temp 101.8F.   10/8: BD8. GEORGE. Cr bumped. decreased LR to 75cc/hr. Adding simethicone ATC. incr hydralazine 50mgTID. Incr labetalol 300mgTID. Na 145, decreased FWF to 250q6. Start precedex. FENa consistent with intrinsic kidney injury. Pend repeat renal US. Retaining up to 1.3L, bladder scans q6, straight cath PRN  10/9: BD 9. GEORGE overnight. Neuro stable. abd xray for distention w non-specific gas pattern, OGT to LIWS for morning. duonebs/mucomyst to q8 for improving secretions. Changed tube feeds to Jevity 1.5 20cc/hr, low rate due to abdominal distention, nepro dense and more difficult to digest. Tolerating CPAP, confirmed by ABG.   10/10: BD 10. GEORGE overnight. Neuro stable. (+) gabriel for urinary retention on bladder scan. inc TF to goal rate of 40cc/hr. family leaning toward pursuing trach/PEG. 1/2 amp for FS 81.   10/11: BD 11. GEORGE overnight. Neuro stable. Trach/PEG consults placed.   10/12: BD 12. GEORGE overnight. Neuro stable. MRI brain complete.   10/13: BD 13. Increase flomax. Hold SQH after PM dose for trach tm. IVL.   10/14: BD 14. GEORGE overnight, remains on AC/VC. Gabriel placed for urinary retention. Dc'd free water.  S/p trach with pulm. NGT placed and CXR confirmed in good position.   10/15: BD 15, GEORGE ovn. resumed feeds. spiked 101, pan cx sent.   10/16: BD 16. GEORGE ovn. Lokelma 5mg for K+ 5.4. Started vanc q 24/zosyn for empiric PNA coverage, IVF to 100/hr. PEG held for fever.   10/17: BD 17,  ordered serum osm and urine osm for am. Started sinemet for neurostimulation. Increased cardura to 0.8. Started FW 100q4, dc'd IVF. MRSA negative, dc'd vanc. NGT replaced d/t coiling.   10/18: BD 18, GEORGE overnight, neuro stable. Amantadine added for neurostim. zosyn changed to unasyn for acinetobacter baumannii, failed TOV and required SC  10/19: BD 19, GEORGE ovn. cardura 2mg added for retention. labetalol decreased 200q8, hydralazine decreased 25q8. Gabriel replaced.   10/20: BD20, GEORGE overnight. NGT dislodged, replaced. PEG tomorrow w/ gen surg, FW increased to 150q4 and labetalol decreased to 100q8, lokelma given for hyperkalemia.   10/21: BD 21. POD0 PEG placement with Gen surg. decr labetolol to 50q8, incr. cardura to 0.4, started lokelma and phoslo, dc gabriel POD0 PEG placement with Gen surg.  10/22: BD 22. Plan to start TF today via PEG. dc labetalol, Following ophtho recs. Increased apnea settings - found to be in cheyne-moe respiration. CPAP 5/5.  10/23: BD 23. hydralazine d/c'd, trach collar trial today. Rectal tube placed at 6am.  10/24: BD24, o/n lokelma held due to diarrhea. Free water 100q6 resumed. dc'd tamsulosin, amantadine. Incr'd cardura to 8mg qhs. Dc'd FW. Switched jevity to nepro. gabriel placed for high urine output. Started SL atropine for oral secretions. Dc'd free water.  10/25: BD25, o/n decreased suctioning requirements to > q4hrs, GEORGE. Cr improving, cont phoslo, lokelma held at this time. Gabriel placed yest, cont. Tolerating trach collar. Given 500cc plasmalyte bolus for ANIKA. Dc'd sinemet.   10/26: BD26, o/n resumed lokelma 5mg daily and resumed 100cc free water q6hrs. Change in neuro status with new right pupillary dilation with anisocoria (right pupil 6mm fixed and left pupil 3mm briskly reactive). Given 23.4% NaCl bullet, taken for emergent CTH showing mostly resolved pontine hemorrhage, continued brainstem hypodensity likely edema d/t hemorrhage, no new hemorrhage or infarct, no herniation, mild increase in size of left lateral ventricle. Vitals remaine stable. Na goal > 140.   10/27: BD27, o/n GEORGE.Neuro stable. Pend stepdown with airway bed.   10/28: BD 28. GEORGE overnight. Neuro stable. Miralax ordered. Gabriel removed, pending TOV.  10/29: BD 29. GEORGE o/n. Given 2L NS over 8 hrs for increased BUN/Cr ratio. Gabriel placed for frequent straight cath.   10/30: BD 30.   10/31: BD 31. GEORGE overnight. Na 149, increased free water to 200q6. 1L NS for uptrending BUN.   11/1: BD 32. GEORGE overnight. Given 1L NS for dehydration. Na 146, increased FW to 250q6.   11/2: BD 33 GEORGE overnight, neuro stable, given 500cc bolus for net negative status and tachycardia   11/3: BD 34, GEORGE overnight, neuro stable. Patient remains tachycardic, EKG showing sinus tachycardia, given additional 500cc NS bolus. Febrile to 101.9F, pan cultured (without UA), CXR WNL, given tylenol.   11/4: BD 35, GEORGE overnight, neuro stable. Given 1L NS for tachycardia. sputum (+) for stenotropohomas maltophilia.   11/5: BD 36 GEORGE overnight, neuro stable. Vancomycin dc'd. Chest PT BID. ID consulted, cont zosyn.  11/6: BD 37. blood cx + klebsiella dc zosyn changed to cefepime, CTAP ordered, rpt blood cx sent.    11/7: BD 38. Pending CT A/P, given 250cc bolus and starting maintenance fluids overnight. Pending CT A/P after bolus   11/8: BD 39. CT CAP negative for infection.   11/9: BD 40. GEORGE overnight.  11/10: BD 41. GEORGE overnight. desat to 85 on trach collar, O2 inc to 10L and 100%, O2 sat inc to 95. pt tachy to 110s, euvolemic. given tylenol. ABG and CXR ordered. spiked fever, pancultured, RVP negative. AM ABG w pO2 79, rpt w pO2 79. pt appears comfortable, satting 94%.   11/11: BD 42. GEORGE overnight. pt became tachy to 130s, desat to 90 on 100% FiO2 and 10L. suctioned, (+) productive cough. temp 101.4, given 1g IV tylenol and 500cc NS bolus for euvolemia. fever and HR downtrending. LE dopplers negative for dvt  11/12: BD 43, GEORGE ovn, fever and HR downtrending, satting 97% 70% FIO2  11/13: BD 44, GEORGE ovn. started standing tylenol x24 hours for tachycardia. desat to 80s, o2 increased. CXR stable, pending CTA PE protocol.   11/14: BD 45, GEORGE overnight, neuro stable. resp therapy dec FiO2 to 70%.   11/15: BD 46, GEORGE overnight, neuro stable.  Rapid called for desaturation 30s, tachycardic 140s. Patient bagged, 100% fio2, heavily suctioned. CXR/POCUS unremarkable. ABG c/w desaturation. WBC 14.71. Afebrile. O2 improved to 90s and patient upgraded to ICU. ABG paO2 30s improved to 89 on vent. IV Tylenol x 1, sputum sent. Start protonix while o-n vent.   11/16: BD 47. POCUS showed collapsable IVCF, given 1L bolus. Vanco/Cefpime added empriic for PNA, NGT feeds restarted. MRSA swab neg, Vanc DC'd.   11/17: BD 48. GEORGE overnight. 1l bolus for tachycardia. Spiked to 101, cultured. 500cc bolus for tachycardia, tachy to 148 given 25mcg fentanyl, 250cc albumin, 1.5L bolus. 5 IV lopressor with response HR to 100s. +Stenotrophomonas on sputum cx.   11/18: BD 49. GEORGE overnight. Consulted ID, cefepime switched to bactrim x7days. Started hydrochlorothiazine 12.5mg daily.  11/19: BD 50. Tachy 120s, given tylenol and 500cc NS. Tolerating 5/5, switched to TCx. Holding phos binder. D/c Bactrim. D/c gabriel, f/u TOV. Dc'd PPI.   11/20: BD 51. GEORGE ovn. 1600 satting low 90s, mildly tachy to 110s, afebrile, RR wnl. O2 improved to mid 90s while inc O2 to 100% on TCx. CPAP 5/5 placed back on.  11/21: BD 52, GEORGE ovn, tolerating CPAP 5/5. Switch to trach collar during the day if tolerating well. HCTZ held for Cr bump, straight cath frequence increased to q4  11/22: BD 53, GEORGE ovn. Resumed phoslo. Gabriel placed. Resumed HCTZ.   11/23: BD 54. Holding tylenol in setting of possible fever, will require pan cx if febrile. Cr improved today. Cont CPAP. Bowel regimen held i/s/o diarrhea. FOBT negative.  11/24: BD 55. GEORGE overnight. Neuro stable. HCTZ dc'ed, started lisinopril 5. Lokelma dc'ed for K 3.7.   11/25: BD 56, GEORGE overnight. Neuro stable. dc'd lisinopril 5mg. Gabriel dc'd. TOV. 1545 noted to be hypotensive, MAP 50, in supine position on chair, HR 60s, afebrile, O2 96%. Given 1L cc NS bolus, placed back on bed in reverse trendelenberg, improved to map of 66. Neostick at bedside. Vitals check q1h. Dc'ed amlodipine. Failed TOV, bladder scan q6, sc prn. Added back Senna.   11/26: BD 57, GEORGE overnight. Neuro stable. Dc'd phoslo.   11/27: BD 58, GEORGE overnight. Neuro stable.    11/28: BD 59. Gabriel replaced.   11/29: GEORGE.  11/30: GEORGE, neuro stable.   12/1: BD 62, GEORGE overnight  12/2: BD 63, GEORGE overnight.; Given 1L bolus of LR for uptrending BUN/Cr.  12/3: BD 64. Reinstated eye gtt/moisture chamber given increased Rt eye injection  12/4: BD 65. GEORGE overnight. Attempted to speak with ophtho regarding eyelid weight/closure but no answer, full mailbox.   12/5: BD 66, GEORGE overnight, bowel regimen increased and had BM.   12/6: BD 67, GEORGE overnight, neuro stable.  12/7: GEORGE overnight, neuro stable. /110s, given x1 hydralazine 10 mg IVP. Restarted home amlodipine 5mg.  12/8: GEORGE. OOB to chair.     12/11: GEORGE, mucomyst added for thick secretions, simethicone for abd distension, abd xray with stool burden, increased bowel regimen.   12/12: GEORGE overnight.   12/13: GEORGE overnight.   12/14: GEORGE overnight  12/15: o/n Patient became tachycardic HR 120s and 10 minutes later O2 sat dropped as low as 89%. Patient suctioned without improvement in O2 sat and tube feeds found in suction catheter. TFs held.  STAT CXR ordered. STAT labs sent. Respiratory therapist called to bedside and patient trach connected to ventilator. After connection to ventilator and further suctioning O2 sat improved to 97% but patient HR remains 120-130s. Upgraded to NSICU for further management. Vancomycin and zosyn started. CTA  chest PE protocol and CTH ordered. Blood cultures sent.given 500cc bolus, rpt ABG sent pO2 243, CTH and CTA chest done. FS while NPO, FiO2 dec 50 pending ABG. sputum cx positive for few GPC and GNR.   12/16: GEORGE overnight, restarted amlodipine, troponin 75   12/17: GEORGE overnight, neuro stable. Attempted CPAP this morning, did not tolerate and back on full vent support. Dc'd Vanc. Tachycardia to 140s, noted extremities to be twitching along with jaw twitching. Given 2g of Keppra, total 4mg ativan and placed on EEG, full set of labs and lactate negative. Resumed trickle feeds at 20cc/hr. Given 250cc albumin for tachycardia.   12/18: GEORGE overnight. Neuro stable. CTH stable. EEG negative and dc'd.   12/19: GEORGE overnight. neuro stable. SIMV most of day, AC/VC at night.   12/20: GEORGE overnight, neuro stable. remains on VC/AC  12/21: GEORGE ovn    Vital Signs Last 24 Hrs  T(C): 36.6 (20 Dec 2024 21:48), Max: 36.7 (20 Dec 2024 18:28)  T(F): 97.9 (20 Dec 2024 21:48), Max: 98.1 (20 Dec 2024 18:28)  HR: 72 (21 Dec 2024 00:10) (72 - 103)  BP: 145/105 (21 Dec 2024 00:10) (104/70 - 148/94)  BP(mean): 121 (21 Dec 2024 00:10) (82 - 122)  RR: 14 (21 Dec 2024 00:10) (14 - 19)  SpO2: 100% (21 Dec 2024 00:10) (100% - 100%)    Parameters below as of 21 Dec 2024 00:10  Patient On (Oxygen Delivery Method): ventilator    O2 Concentration (%): 40    I&O's Summary    19 Dec 2024 07:01  -  20 Dec 2024 07:00  --------------------------------------------------------  IN: 1935 mL / OUT: 1307 mL / NET: 628 mL    20 Dec 2024 07:01  -  21 Dec 2024 01:04  --------------------------------------------------------  IN: 1235 mL / OUT: 1375 mL / NET: -140 mL        PHYSICAL EXAM:  General: NAD, pt is sitting up in bed, on AC/VC 40/400/14/6  HEENT: PERRL 3mm briskly reactive, EOM b/l vertically tracking and will cross to left, face appears symmetric  CV: RRR, S1, S2  Resp: breathing non-labored on AC/VC, chest rise symmetric  GI: abd soft, NTND  Neuro: AOx3 w eyebrow movement, intermittently follows commands on right side and will open/close mouth to command  RUE shows 2 fingers to command, Right tricep 1/5; LUE withdraws to noxious; RLE wiggles toes to command. LLE w/d to noxious.   Unable to assess sensation 2/2 mental status   Extremities: distal pulses 2+ x4.  Wound/incision: n/a  Drain: (+) PEG (+) gabriel (+) trach    LABS:                        10.5   6.83  )-----------( 176      ( 20 Dec 2024 04:42 )             32.3     12-20    143  |  107  |  22  ----------------------------<  142[H]  3.8   |  25  |  1.25    Ca    8.7      20 Dec 2024 04:42  Phos  3.0     12-20  Mg     1.9     12-20        Urinalysis Basic - ( 20 Dec 2024 04:42 )    Color: x / Appearance: x / SG: x / pH: x  Gluc: 142 mg/dL / Ketone: x  / Bili: x / Urobili: x   Blood: x / Protein: x / Nitrite: x   Leuk Esterase: x / RBC: x / WBC x   Sq Epi: x / Non Sq Epi: x / Bacteria: x          CAPILLARY BLOOD GLUCOSE          Drug Levels: [] N/A  Vancomycin Level, Trough: 37.3 ug/mL (12-16 @ 16:23)    CSF Analysis: [] N/A      Allergies    Allergy Status Unknown    Intolerances        Home Medications:      MEDICATIONS:  MEDICATIONS  (STANDING):  acetylcysteine 10%  Inhalation 4 milliLiter(s) Inhalation every 6 hours  albuterol/ipratropium for Nebulization 3 milliLiter(s) Nebulizer every 6 hours  amLODIPine   Tablet 5 milliGRAM(s) Oral daily  artificial tears (preservative free) Ophthalmic Solution 1 Drop(s) Right EYE every 4 hours  chlorhexidine 0.12% Liquid 15 milliLiter(s) Oral Mucosa every 12 hours  chlorhexidine 2% Cloths 1 Application(s) Topical <User Schedule>  doxazosin 8 milliGRAM(s) Oral at bedtime  erythromycin   Ointment 1 Application(s) Right EYE at bedtime  fluticasone propionate 50 MICROgram(s)/spray Nasal Spray 1 Spray(s) Both Nostrils two times a day  heparin   Injectable 5000 Unit(s) SubCutaneous every 8 hours  levETIRAcetam  Solution 1000 milliGRAM(s) Oral every 12 hours  ofloxacin 0.3% Solution 1 Drop(s) Right EYE four times a day  pantoprazole  Injectable 40 milliGRAM(s) IV Push daily  petrolatum Ophthalmic Ointment 1 Application(s) Both EYES two times a day  sodium chloride 0.65% Nasal 1 Spray(s) Both Nostrils two times a day    MEDICATIONS  (PRN):  acetaminophen   Oral Liquid .. 650 milliGRAM(s) Oral every 6 hours PRN Temp greater or equal to 38.5C (101.3F), Mild Pain (1 - 3)  oxyCODONE    Solution 5 milliGRAM(s) Oral every 4 hours PRN Moderate Pain (4 - 6)      CULTURES:  Culture Results:   Moderate Stenotrophomonas maltophilia  Commensal iram consistent with body site (12-15 @ 05:06)  Culture Results:   No growth at 5 days (12-15 @ 01:03)      RADIOLOGY & ADDITIONAL TESTS:      ASSESSMENT:  43 y/o PMH ?stroke/MI present to [facility] after collapsing at work. Decorticate posturing, vomiting, intubated for airway protection. 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 Transferred to [facility] for further management. s/p trach 10/14. s/p peg 10/21. Re-upgrade to ICU 2/2 desaturation event and suctioning requirements 11/15.     Neuro:  - neuro checks q4/vitals q1h  - pain control: tylenol prn  - seizure tx: keppra 1000mg BID   - vEEG (10/3-4) negative, (10/17-10/19) negative, (12/17-12/18) negative  - CTH 9/30: enlarged pontine hemorrhage, CTH 10/3: stable, CTH 10/25: mostly resolved pontine hemorrhage, CTH 12/15: R mastoid air cell opacification; acute otitis media vs sterile effusion, CTH 12/18: stable.  - MRI brain 10/12: parenchymal hemorrhage, acute/subacute R cerebellar stroke    - stroke core measures, stroke neuro signed off    CV:  - -160  - HTN: amlodipine 5mg, hold lisinopril 5mg   - echo (9/30) EF 75%, repeat 12/16: 57%    Resp:  - trach to vent, AC/VC 40/400/14/6  - CTA PE protocol 12/15 negative   - Secretions: duonebs/mucomyst/chest PT q6h     GI:  - TF via PEG (placed 10/21 by gen surg)  - bowel regimen held for loose stool, last BM 12/19    Renal:  - IVL  - Urinary retenion: Gabriel replaced 12/15  - CKD: trend BUN/Cr  - renal US 10/1: echogenicity c/w chronic med renal dz, repeat 10/8: inc renal echogeicity, c/f medical renal disease w increased hydro     Endo:  - A1c 5.4    Heme:  - DVT ppx: SCDs, SQH 5000u q8h   - LE dopplers negative 12/16    ID:  - empiric PNA: zosyn (12/15-12/20), s/p vanc (12/15-12/16)  - blood and sputum cultures 12/15  - last pancx 11/16, MRSA swab neg 11/16   - s/p Stenotrophomonas maltophilia PNA: s/p Bactrim (11/18-11/19) s/p Cefepime (11/16-11/18)  - 11/3, (+) sputum for stenotrophomas maltophlia, blood cx (+) klebsiella, cefepime 2gq12 (11/6 - 11/12)   - empiric tx: s/p zosyn (11/3-11/6) , s/p vanc  (11/3-11/5)  - S/p Ancef (10/4-10/14) for PNA, and s/p Unasyn (10/18-10/23) +actinobacter baumanii     MISC:  - ophtho consult for keratitis  - erythromycin ointment R eye q4hrs, ofloxacin ointment R eye QID, artificial tears R eye q2hrs, moisture chamber at bedtime    Dispo: ICU status, full code, pending placement and guardianship hearing     D/w Dr. D'Amico and Dr. Reveles    Assessment: present when checked     [] GCS   E   V   M     Heart Failure: [] Acute, [] acute on chronic, [] chronic   Heart Failure: [] Diastolic (HFpEF), [] Systolic (HRrEF), [] Combined (HFpEF and HFrEF), [] RHF, [] Pulm HTN, [] Other     [] ANIKA, [] ATN, [] AIN, [] other   [] CKD1, [] CKD2, [] CKD3, [] CKD4, [] CKD5, [] ESRD     Encephalopathy: [] Metabolic, [] Hepatic, [] Toxic, [] Neurological, [] Other     Abnormal Nutritional Status: [] malnutrition (see nutrition note), []underweight: BMI <19, [] morbid obesity: BMI >40, [] Cachexia     [] Sepsis   [] Hypovolemic shock, [] Cardiogenic shock, [] Hemorrhagic shock, [] Neurogenic shock   [] Acute respiratory failure   [] Cerebral edema, [] Brain compression / herniation   [] Functional quadriplegia   [] Acute blood loss anemia

## 2024-12-21 NOTE — PROGRESS NOTE ADULT - ASSESSMENT
44M with unclear PMH (?stroke, MI) who was found down at work, intubated for airway protection and found to have acute parenchymal hemorrhage within nabil with mass effect (+ acute/subacute right cerebellar infarct) in setting of hypertensive emergency, transferred to [facility] for further neurosurgical care. Hospital course c/b poor neurologic recovery s/p trach-PEG, AUR s/p gabriel, ANIKA on CKD c/b hyperkalemia, HAP s/p amp-sulbactam (EOT 10/23), K aerogenes bacteremia  treated with 2 weeks of Cefepime per ID , On 11/15 he became septic and was transferred to NSICU due to increased o2 requirements and needed Vent support , Trach Cultures + for Stenotrophomonas which was treated with 7 days of Bactrim per ID , has been weaned of Vent since 11/23 and is now on 10lts at 40% o2 through Trach Collar     # Pontine hemorrhage  # Encephalopathy due to Intracranial Bleed   - Acute parenchymal hemorrhage within nabil with mass effect (+ acute/subacute right cerebellar infarct) in setting of hypertensive emergency.   - MRI brain also demonstrated acute/subacute R cerebellar stroke.   - No Neurosurgical Interventions were performed   - due to IPH and Cerebellar stroke patient has become Trach and Peg Dependent    # Hypertension  - restarted amlodipine 5mg daily, BPs now better     # Acute kidney injury  # Acute Tubular Necrosis superimposed on CKD. 3  - Pt s/p US renal with chronic medical renal disease  - Elevated BUN , Not on any meds that can elevate BUN , Hgb Stable   - Cr improved, today 1.18 within pt baseline range, ctm     # Chronic respiratory failure.   -Pt s/p percutaneous trach by pulm on 10/14/24  - Continue trach collar   - Continue routine trach care and suctioning PRN  - Chest PT    # Neurogenic dysphagia.   -Pt s/p PEG with surgery 10/21/24  - TF per nutrition  - aspiration precautions and elevation of HOB.    #Acute urinary retention.   - doxazosin 8mg    # Functional quadriplegia in  setting of brainstem hemorrhage  - decub precautions  - care per nursing protocol     # DVT prop  - Heparin SQ q8     # Dispo:  8Lach   44M with unclear PMH (?stroke, MI) who was found down at work, intubated for airway protection and found to have acute parenchymal hemorrhage within nabil with mass effect (+ acute/subacute right cerebellar infarct) in setting of hypertensive emergency, transferred to [facility] for further neurosurgical care. Hospital course c/b poor neurologic recovery s/p trach-PEG, AUR s/p gabriel, ANIKA on CKD c/b hyperkalemia, HAP s/p amp-sulbactam (EOT 10/23), K aerogenes bacteremia  treated with 2 weeks of Cefepime per ID , On 11/15 he became septic and was transferred to NSICU due to increased o2 requirements and needed Vent support , Trach Cultures + for Stenotrophomonas which was treated with 7 days of Bactrim per ID , has been weaned of Vent since 11/23 and is now on 10lts at 40% o2 through Trach Collar. Stepped up to the NSICU on 12/15 for hypoxia and tachycardia. PE ruled out. On abx for 5 days. Unclear etiology. Now stepped down to 8Lach.    # Pontine hemorrhage  # Encephalopathy due to Intracranial Bleed   - Acute parenchymal hemorrhage within nabil with mass effect (+ acute/subacute right cerebellar infarct) in setting of hypertensive emergency.   - MRI brain also demonstrated acute/subacute R cerebellar stroke.   - No Neurosurgical Interventions were performed   - due to IPH and cerebellar stroke patient has become Trach and Peg Dependent    # Hypertension  - c/w amlodipine 5mg daily, BPs now better     # Acute kidney injury  # Acute Tubular Necrosis superimposed on CKD. 3  - Pt s/p US renal with chronic medical renal disease  - Elevated BUN , Not on any meds that can elevate BUN , Hgb Stable   - Cr 1.18 within pt baseline range, ctm     # Chronic respiratory failure.   -Pt s/p percutaneous trach by pulm on 10/14/24  - Continue trach collar   - Continue routine trach care and suctioning PRN  - Chest PT    # Neurogenic dysphagia.   -Pt s/p PEG with surgery 10/21/24  - TF per nutrition  - aspiration precautions and elevation of HOB.    #Acute urinary retention.   - doxazosin 8mg    # Functional quadriplegia in  setting of brainstem hemorrhage  - decub precautions  - care per nursing protocol     # DVT prop  - Heparin SQ q8     # Dispo:  8Lach

## 2024-12-22 PROCEDURE — 99233 SBSQ HOSP IP/OBS HIGH 50: CPT

## 2024-12-22 PROCEDURE — 99254 IP/OBS CNSLTJ NEW/EST MOD 60: CPT

## 2024-12-22 PROCEDURE — 99232 SBSQ HOSP IP/OBS MODERATE 35: CPT

## 2024-12-22 RX ORDER — CEFEPIME 2 G/20ML
2000 INJECTION, POWDER, FOR SOLUTION INTRAVENOUS EVERY 8 HOURS
Refills: 0 | Status: COMPLETED | OUTPATIENT
Start: 2024-12-23 | End: 2024-12-23

## 2024-12-22 RX ORDER — LORAZEPAM 4 MG/ML
2 VIAL (ML) INJECTION ONCE
Refills: 0 | Status: DISCONTINUED | OUTPATIENT
Start: 2024-12-22 | End: 2024-12-22

## 2024-12-22 RX ORDER — LORAZEPAM 4 MG/ML
1 VIAL (ML) INJECTION ONCE
Refills: 0 | Status: DISCONTINUED | OUTPATIENT
Start: 2024-12-22 | End: 2024-12-22

## 2024-12-22 RX ORDER — VANCOMYCIN HCL IN 5 % DEXTROSE 1.5G/250ML
1250 PLASTIC BAG, INJECTION (ML) INTRAVENOUS ONCE
Refills: 0 | Status: COMPLETED | OUTPATIENT
Start: 2024-12-22 | End: 2024-12-22

## 2024-12-22 RX ORDER — FOSPHENYTOIN SODIUM 50 MG/ML
100 INJECTION INTRAMUSCULAR; INTRAVENOUS THREE TIMES A DAY
Refills: 0 | Status: DISCONTINUED | OUTPATIENT
Start: 2024-12-22 | End: 2025-01-03

## 2024-12-22 RX ORDER — ALBUMIN (HUMAN) 12.5 G/50ML
250 INJECTION, SOLUTION INTRAVENOUS ONCE
Refills: 0 | Status: COMPLETED | OUTPATIENT
Start: 2024-12-22 | End: 2024-12-22

## 2024-12-22 RX ORDER — VANCOMYCIN HCL IN 5 % DEXTROSE 1.5G/250ML
PLASTIC BAG, INJECTION (ML) INTRAVENOUS
Refills: 0 | Status: DISCONTINUED | OUTPATIENT
Start: 2024-12-22 | End: 2024-12-23

## 2024-12-22 RX ORDER — CEFEPIME 2 G/20ML
2000 INJECTION, POWDER, FOR SOLUTION INTRAVENOUS EVERY 8 HOURS
Refills: 0 | Status: DISCONTINUED | OUTPATIENT
Start: 2024-12-23 | End: 2024-12-23

## 2024-12-22 RX ORDER — FOSPHENYTOIN SODIUM 50 MG/ML
1600 INJECTION INTRAMUSCULAR; INTRAVENOUS ONCE
Refills: 0 | Status: COMPLETED | OUTPATIENT
Start: 2024-12-22 | End: 2024-12-22

## 2024-12-22 RX ORDER — LEVETIRACETAM 10 MG/ML
1500 INJECTION, SOLUTION INTRAVENOUS
Refills: 0 | Status: DISCONTINUED | OUTPATIENT
Start: 2024-12-22 | End: 2025-03-13

## 2024-12-22 RX ORDER — CEFEPIME 2 G/20ML
INJECTION, POWDER, FOR SOLUTION INTRAVENOUS
Refills: 0 | Status: DISCONTINUED | OUTPATIENT
Start: 2024-12-22 | End: 2024-12-22

## 2024-12-22 RX ORDER — CEFEPIME 2 G/20ML
2000 INJECTION, POWDER, FOR SOLUTION INTRAVENOUS ONCE
Refills: 0 | Status: COMPLETED | OUTPATIENT
Start: 2024-12-22 | End: 2024-12-22

## 2024-12-22 RX ORDER — VANCOMYCIN HCL IN 5 % DEXTROSE 1.5G/250ML
1250 PLASTIC BAG, INJECTION (ML) INTRAVENOUS EVERY 12 HOURS
Refills: 0 | Status: DISCONTINUED | OUTPATIENT
Start: 2024-12-23 | End: 2024-12-23

## 2024-12-22 RX ORDER — CEFEPIME 2 G/20ML
INJECTION, POWDER, FOR SOLUTION INTRAVENOUS
Refills: 0 | Status: COMPLETED | OUTPATIENT
Start: 2024-12-22 | End: 2024-12-23

## 2024-12-22 RX ADMIN — Medication 500 MILLILITER(S): at 08:58

## 2024-12-22 RX ADMIN — Medication 15 MILLILITER(S): at 17:41

## 2024-12-22 RX ADMIN — HEPARIN SODIUM 5000 UNIT(S): 1000 INJECTION INTRAVENOUS; SUBCUTANEOUS at 05:54

## 2024-12-22 RX ADMIN — Medication 166.67 MILLIGRAM(S): at 23:09

## 2024-12-22 RX ADMIN — AMLODIPINE BESYLATE 5 MILLIGRAM(S): 10 TABLET ORAL at 05:54

## 2024-12-22 RX ADMIN — LEVETIRACETAM 1500 MILLIGRAM(S): 10 INJECTION, SOLUTION INTRAVENOUS at 10:50

## 2024-12-22 RX ADMIN — CEFEPIME 100 MILLIGRAM(S): 2 INJECTION, POWDER, FOR SOLUTION INTRAVENOUS at 19:09

## 2024-12-22 RX ADMIN — IPRATROPIUM BROMIDE AND ALBUTEROL SULFATE 3 MILLILITER(S): .5; 2.5 SOLUTION RESPIRATORY (INHALATION) at 05:54

## 2024-12-22 RX ADMIN — Medication 1 DROP(S): at 02:00

## 2024-12-22 RX ADMIN — OFLOXACIN 1 DROP(S): 3 SOLUTION OPHTHALMIC at 12:42

## 2024-12-22 RX ADMIN — Medication 1 APPLICATION(S): at 17:41

## 2024-12-22 RX ADMIN — OFLOXACIN 1 DROP(S): 3 SOLUTION OPHTHALMIC at 06:00

## 2024-12-22 RX ADMIN — ACETYLCYSTEINE 4 MILLILITER(S): 200 INHALANT RESPIRATORY (INHALATION) at 05:54

## 2024-12-22 RX ADMIN — ALBUMIN (HUMAN) 125 MILLILITER(S): 12.5 INJECTION, SOLUTION INTRAVENOUS at 12:41

## 2024-12-22 RX ADMIN — Medication 650 MILLIGRAM(S): at 19:36

## 2024-12-22 RX ADMIN — Medication 1 APPLICATION(S): at 05:59

## 2024-12-22 RX ADMIN — Medication 2 MILLIGRAM(S): at 10:34

## 2024-12-22 RX ADMIN — Medication 650 MILLIGRAM(S): at 09:00

## 2024-12-22 RX ADMIN — Medication 1 SPRAY(S): at 17:41

## 2024-12-22 RX ADMIN — Medication 2 MILLIGRAM(S): at 09:19

## 2024-12-22 RX ADMIN — Medication 1 DROP(S): at 05:59

## 2024-12-22 RX ADMIN — FLUTICASONE PROPIONATE 1 SPRAY(S): 50 SPRAY, METERED NASAL at 05:59

## 2024-12-22 RX ADMIN — IPRATROPIUM BROMIDE AND ALBUTEROL SULFATE 3 MILLILITER(S): .5; 2.5 SOLUTION RESPIRATORY (INHALATION) at 09:07

## 2024-12-22 RX ADMIN — ACETYLCYSTEINE 4 MILLILITER(S): 200 INHALANT RESPIRATORY (INHALATION) at 17:35

## 2024-12-22 RX ADMIN — Medication 2 MILLIGRAM(S): at 09:08

## 2024-12-22 RX ADMIN — Medication 1 APPLICATION(S): at 06:00

## 2024-12-22 RX ADMIN — IPRATROPIUM BROMIDE AND ALBUTEROL SULFATE 3 MILLILITER(S): .5; 2.5 SOLUTION RESPIRATORY (INHALATION) at 21:13

## 2024-12-22 RX ADMIN — ERYTHROMYCIN 1 APPLICATION(S): 5 OINTMENT OPHTHALMIC at 21:14

## 2024-12-22 RX ADMIN — DOXAZOSIN MESYLATE 8 MILLIGRAM(S): 8 TABLET ORAL at 21:14

## 2024-12-22 RX ADMIN — HEPARIN SODIUM 5000 UNIT(S): 1000 INJECTION INTRAVENOUS; SUBCUTANEOUS at 21:14

## 2024-12-22 RX ADMIN — FOSPHENYTOIN SODIUM 64 MILLIGRAM(S) PE: 50 INJECTION INTRAMUSCULAR; INTRAVENOUS at 14:24

## 2024-12-22 RX ADMIN — OFLOXACIN 1 DROP(S): 3 SOLUTION OPHTHALMIC at 17:41

## 2024-12-22 RX ADMIN — Medication 500 MILLILITER(S): at 10:34

## 2024-12-22 RX ADMIN — Medication 1 DROP(S): at 21:14

## 2024-12-22 RX ADMIN — Medication 1 DROP(S): at 17:40

## 2024-12-22 RX ADMIN — ACETYLCYSTEINE 4 MILLILITER(S): 200 INHALANT RESPIRATORY (INHALATION) at 21:13

## 2024-12-22 RX ADMIN — Medication 15 MILLILITER(S): at 07:44

## 2024-12-22 RX ADMIN — Medication 1 SPRAY(S): at 05:59

## 2024-12-22 RX ADMIN — IPRATROPIUM BROMIDE AND ALBUTEROL SULFATE 3 MILLILITER(S): .5; 2.5 SOLUTION RESPIRATORY (INHALATION) at 17:35

## 2024-12-22 RX ADMIN — Medication 1 DROP(S): at 14:24

## 2024-12-22 RX ADMIN — Medication 1 DROP(S): at 09:12

## 2024-12-22 RX ADMIN — FLUTICASONE PROPIONATE 1 SPRAY(S): 50 SPRAY, METERED NASAL at 17:41

## 2024-12-22 RX ADMIN — Medication 650 MILLIGRAM(S): at 10:02

## 2024-12-22 RX ADMIN — ACETYLCYSTEINE 4 MILLILITER(S): 200 INHALANT RESPIRATORY (INHALATION) at 09:07

## 2024-12-22 RX ADMIN — HEPARIN SODIUM 5000 UNIT(S): 1000 INJECTION INTRAVENOUS; SUBCUTANEOUS at 14:23

## 2024-12-22 RX ADMIN — FOSPHENYTOIN SODIUM 104 MILLIGRAM(S) PE: 50 INJECTION INTRAMUSCULAR; INTRAVENOUS at 17:35

## 2024-12-22 RX ADMIN — Medication 650 MILLIGRAM(S): at 18:17

## 2024-12-22 RX ADMIN — Medication 40 MILLIGRAM(S): at 12:41

## 2024-12-22 NOTE — CONSULT NOTE ADULT - CONSULT REQUESTED DATE/TIME
01-Oct-2024 10:00
04-Oct-2024 15:42
25-Nov-2024 11:10
01-Oct-2024 11:29
01-Oct-2024 02:00
05-Nov-2024 14:49
11-Oct-2024 12:52
22-Dec-2024 10:00
12-Oct-2024 12:15
20-Nov-2024 17:03
28-Oct-2024 10:05

## 2024-12-22 NOTE — CONSULT NOTE ADULT - ASSESSMENT
44 year-old-male with unclear PMH (?stroke, MI) who was found down at work, intubated for airway protection and found to have acute large parenchymal hemorrhage within nabil with mass effect (+ acute/subacute right cerebellar infarct) in setting of hypertensive emergency, and R cerebellar stroke transferred to [facility] for further neurosurgical care. 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     Recommendations:    - Continue vEEG monitoring  - Team was advised to give a total of 6mg ativan in AM  - Keppra was increased to 1500mg Q12hrs in AM  - Fosphenytoin load 200mg then maintenance dose 100mg TID was in PM  - Maintain seizure/fall/aspiration precautions

## 2024-12-22 NOTE — CONSULT NOTE ADULT - PROVIDER SPECIALTY LIST ADULT
Epilepsy
Infectious Disease
Nephrology
Surgery
Neurology
Physiatry
Ethics
Palliative Care
Podiatry
Pulmonology
Hospitalist

## 2024-12-22 NOTE — CONSULT NOTE ADULT - SUBJECTIVE AND OBJECTIVE BOX
EPILEPSY CONSULT NOTE:   HPI:  44M with unclear PMH (?stroke, MI) who was found down at work, intubated for airway protection and found to have acute parenchymal hemorrhage within nabil with mass effect (+ acute/subacute right cerebellar infarct) in setting of hypertensive emergency, transferred to [facility] for further neurosurgical care. 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 On 11/15 he became septic and was transferred to NSICU due to increased o2 requirements and needed Vent support . 12/17 it was reported that patient had left facial twitching and was started on Keppra 1000mg with resolution. EEG obtained from 12-17 - 12/18 showed no risks for seizures or electrographic seizures. Epilepsy was consulted on 12/22/24 for re-emergence of left facial twitching reported as not as aggressive as the first event, but persistent for several hours. Unable to obtain seizure hx from patient given his altered level of consciousness, but it has been noted that his first lifetime event was on 12/17.      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  Background:  continuous, with predominantly  beta >theta frequencies. with faster beta activity in anterior regions  Voltage:  Low (most <20uV LBP Peak-Trough)  Organization:  Appropriate anterior-posterior gradient  Posterior Dominant Rhythm:  No clear PDR absent  Sleep:  Symmetric, synchronous spindles and K complexes.  Focal abnormalities:  No persistent asymmetries of voltage or frequency.  Spontaneous Activity:  No epileptiform discharges       Events:  •	No electrographic seizures or significant clinical events occurred during this study.  Provocations:  •	Hyperventilation: was not performed.  •	Photic stimulation: was not performed.  Daily Summary:    •	Mild diffuse generalized attenuated in voltage indicating mild diffuse cerebral dysfunction  •	No epileptiform discharges or electrographic seizures     Review of Systems:  CONSTITUTIONAL:  No weight loss, fever, chills, weakness or fatigue.  HEENT:  Eyes:  No visual loss, blurred vision, double vision or yellow sclerae. Ears, Nose, Throat:  No hearing loss, sneezing, congestion, runny nose or sore throat.  SKIN:  No rash or itching.  CARDIOVASCULAR:  No chest pain, chest pressure or chest discomfort. No palpitations or edema.  RESPIRATORY:  No shortness of breath, cough or sputum.  GASTROINTESTINAL:  No anorexia, nausea, vomiting or diarrhea. No abdominal pain or blood.  GENITOURINARY: No Burning on urination.   NEUROLOGICAL:  see HPI  MUSCULOSKELETAL:  No muscle, back pain, joint pain or stiffness.  HEMATOLOGIC:  No anemia, bleeding or bruising.  LYMPHATICS:  No enlarged nodes. No history of splenectomy.  PSYCHIATRIC:  No history of depression or anxiety.  ENDOCRINOLOGIC:  No reports of sweating, cold or heat intolerance. No polyuria or polydipsia.  ALLERGIES:  No history of asthma, hives, eczema or rhinitis.       PAST MEDICAL & SURGICAL HISTORY:  Acute myocardial infarction      CVA (cerebral vascular accident)           FAMILY HISTORY:       Social History:  Alcohol, illicits, smoking?:  Driving?:  Occupation:     Allergies  Allergy Status Unknown       MEDICATIONS  (STANDING):  acetylcysteine 10%  Inhalation 4 milliLiter(s) Inhalation every 6 hours  albuterol/ipratropium for Nebulization 3 milliLiter(s) Nebulizer every 6 hours  amLODIPine   Tablet 5 milliGRAM(s) Oral daily  artificial tears (preservative free) Ophthalmic Solution 1 Drop(s) Right EYE every 4 hours  chlorhexidine 0.12% Liquid 15 milliLiter(s) Oral Mucosa every 12 hours  chlorhexidine 2% Cloths 1 Application(s) Topical <User Schedule>  doxazosin 8 milliGRAM(s) Oral at bedtime  erythromycin   Ointment 1 Application(s) Right EYE at bedtime  fluticasone propionate 50 MICROgram(s)/spray Nasal Spray 1 Spray(s) Both Nostrils two times a day  fosphenytoin IVPB 100 milliGRAM(s) PE IV Intermittent three times a day  heparin   Injectable 5000 Unit(s) SubCutaneous every 8 hours  levETIRAcetam 1500 milliGRAM(s) Oral two times a day  ofloxacin 0.3% Solution 1 Drop(s) Right EYE four times a day  pantoprazole  Injectable 40 milliGRAM(s) IV Push daily  petrolatum Ophthalmic Ointment 1 Application(s) Both EYES two times a day  propranolol 10 milliGRAM(s) Oral every 8 hours  sodium chloride 0.65% Nasal 1 Spray(s) Both Nostrils two times a day    MEDICATIONS  (PRN):  acetaminophen   Oral Liquid .. 650 milliGRAM(s) Oral every 6 hours PRN Temp greater or equal to 38.5C (101.3F), Mild Pain (1 - 3)  oxyCODONE    Solution 5 milliGRAM(s) Oral every 4 hours PRN Moderate Pain (4 - 6)       T(C): 37.8 (12-22-24 @ 14:00), Max: 37.8 (12-22-24 @ 14:00)  HR: 114 (12-22-24 @ 13:15) (96 - 128)  BP: 115/74 (12-22-24 @ 12:05) (115/74 - 139/97)  RR: 17 (12-22-24 @ 12:05) (14 - 18)  SpO2: 98% (12-22-24 @ 13:15) (95% - 98%)  Wt(kg): --    General:  Constitutional:  Sitting comfortably in NAD.  Psychiatric: calm, normal affect, no overt anxiety or internal preoccupation  Ears, Nose, Throat: no abnormalities, mucus membranes moist  Neck: supple, no lymphadenopathy or nodules palpable  Cardiovascular: regular rate and rhythm, normal S1/S2, no murmurs   Chest: Clear to bases. 	  Abdomen: soft, non-tender, no hepatosplenomegaly   Extremities: no edema, clubbing or cyanosis  Skin: no rash or neurocutaneous signs     Cognitive:  Orientation, language, memory and knowledge screens intact.  Registration: 	4/4		Serial 7’s: normal		Recall 4/4    Cranial Nerves:  II: Full to confrontation. III/IV/VI: PERRL EOMF No nystagmus  V1V2V3: Symmetric, VII: Face appears symmetric VIII: Normal to screening, IX/X: Palate Elevates Symmetrical  XI: Trapezius Symmetric  XII: Tongue midline  Motor:  Power: 5/5 throughout, tone: normal x 4 limbs, no tremor   Sensation:  Intact to light touch. Intact to pinprick/temperature and vibration.  Coordination/Gait:  Finger-nose-finger intact, normal rapid-alternating movements.  Fine motor normal with normal rapid finger taps and heel-toe tapping   narrow based gait, tandem forward and back ok  hops well on both feet, heel and toe walking normal   Reflexes:  DTR: 2+ symmetric all 4 limbs, no clonus  Plantar responses: Down bilaterally         Labs  CBC Full  -  ( 21 Dec 2024 07:53 )  WBC Count : 7.41 K/uL  RBC Count : 3.38 M/uL  Hemoglobin : 10.1 g/dL  Hematocrit : 32.3 %  Platelet Count - Automated : 197 K/uL  Mean Cell Volume : 95.6 fl  Mean Cell Hemoglobin : 29.9 pg  Mean Cell Hemoglobin Concentration : 31.3 g/dL  Auto Neutrophil # : x  Auto Lymphocyte # : x  Auto Monocyte # : x  Auto Eosinophil # : x  Auto Basophil # : x  Auto Neutrophil % : x  Auto Lymphocyte % : x  Auto Monocyte % : x  Auto Eosinophil % : x  Auto Basophil % : x    12-21    137  |  104  |  23  ----------------------------<  111[H]  4.0   |  23  |  1.18    Ca    8.5      21 Dec 2024 07:53  Phos  3.8     12-21  Mg     1.8     12-21              EEG: EPILEPSY CONSULT NOTE:   HPI:  44M with unclear PMH (?stroke, MI) who was found down at work, intubated for airway protection and found to have acute large parenchymal hemorrhage within nabil with mass effect (+ acute/subacute right cerebellar infarct) in setting of hypertensive emergency, and R cerebellar stroke transferred to [facility] for further neurosurgical care. 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 On 11/15 he became septic and was transferred to NSICU due to increased o2 requirements and needed Vent support . 12/17 it was reported that patient had left facial twitching and was started on Keppra 1000mg with resolution. EEG obtained from 12-17 - 12/18 showed no risks for seizures or electrographic seizures. Epilepsy was consulted on 12/22/24 for re-emergence of left facial twitching reported as not as aggressive as the first event, but persistent for several hours. A total of 6mg of ativan was given in AM followed by an increased dose of Keppra from 1000mg Q12hrs to 1500mg Q12hrs without improved facial twitching. Fosphenytoin was added, and the facial twitching ceased as well as EEG recordings with no epileptiform discharges or seizures noted. Unable to obtain seizure hx from patient given his altered level of consciousness, but it has been noted that his first lifetime event was on 12/17.      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  Background:  continuous, with predominantly  beta >theta frequencies. with faster beta activity in anterior regions  Voltage:  Low (most <20uV LBP Peak-Trough)  Organization:  Appropriate anterior-posterior gradient  Posterior Dominant Rhythm:  No clear PDR absent  Sleep:  Symmetric, synchronous spindles and K complexes.  Focal abnormalities:  No persistent asymmetries of voltage or frequency.  Spontaneous Activity:  No epileptiform discharges       Events:  •	No electrographic seizures or significant clinical events occurred during this study.  Provocations:  •	Hyperventilation: was not performed.  •	Photic stimulation: was not performed.  Daily Summary:    •	Mild diffuse generalized attenuated in voltage indicating mild diffuse cerebral dysfunction  •	No epileptiform discharges or electrographic seizures     Review of Systems:  Unobtainable 2/2 AMS    PAST MEDICAL & SURGICAL HISTORY:  Acute myocardial infarction  CVA (cerebral vascular accident)     Allergies  Allergy Status Unknown    MEDICATIONS  (STANDING):  acetylcysteine 10%  Inhalation 4 milliLiter(s) Inhalation every 6 hours  albuterol/ipratropium for Nebulization 3 milliLiter(s) Nebulizer every 6 hours  amLODIPine   Tablet 5 milliGRAM(s) Oral daily  artificial tears (preservative free) Ophthalmic Solution 1 Drop(s) Right EYE every 4 hours  chlorhexidine 0.12% Liquid 15 milliLiter(s) Oral Mucosa every 12 hours  chlorhexidine 2% Cloths 1 Application(s) Topical <User Schedule>  doxazosin 8 milliGRAM(s) Oral at bedtime  erythromycin   Ointment 1 Application(s) Right EYE at bedtime  fluticasone propionate 50 MICROgram(s)/spray Nasal Spray 1 Spray(s) Both Nostrils two times a day  fosphenytoin IVPB 100 milliGRAM(s) PE IV Intermittent three times a day  heparin   Injectable 5000 Unit(s) SubCutaneous every 8 hours  levETIRAcetam 1500 milliGRAM(s) Oral two times a day  ofloxacin 0.3% Solution 1 Drop(s) Right EYE four times a day  pantoprazole  Injectable 40 milliGRAM(s) IV Push daily  petrolatum Ophthalmic Ointment 1 Application(s) Both EYES two times a day  propranolol 10 milliGRAM(s) Oral every 8 hours  sodium chloride 0.65% Nasal 1 Spray(s) Both Nostrils two times a day    MEDICATIONS  (PRN):  acetaminophen   Oral Liquid .. 650 milliGRAM(s) Oral every 6 hours PRN Temp greater or equal to 38.5C (101.3F), Mild Pain (1 - 3)  oxyCODONE    Solution 5 milliGRAM(s) Oral every 4 hours PRN Moderate Pain (4 - 6)    VITAL SIGNS:  T(C): 37.8 (12-22-24 @ 14:00), Max: 37.8 (12-22-24 @ 14:00)  HR: 114 (12-22-24 @ 13:15) (96 - 128)  BP: 115/74 (12-22-24 @ 12:05) (115/74 - 139/97)  RR: 17 (12-22-24 @ 12:05) (14 - 18)  SpO2: 98% (12-22-24 @ 13:15) (95% - 98%)  Wt(kg): --    PHYSICAL EXAM:   Constitutional: Mid-aged man in bed trached on ventilator noted w/ L facial twitching   Ears, Nose, Throat: R facial weakness and R eye palsy noted    Cognitive:  Alert but disoriented to name, place and date. 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 Noted with left consistent facial twitching for over 5hrs. +Blink to threat on L eye only. R eye w/ erythema and ptosis. + facial weakness noted on R. No withdrawal to painful stimuli in B/L UE and RLE, but there was sensory signs noted w/ facial grimacing. LLE withdraws to pain. +1 reflexes in all limbs.     Labs  CBC Full  -  ( 21 Dec 2024 07:53 )  WBC Count : 7.41 K/uL  RBC Count : 3.38 M/uL  Hemoglobin : 10.1 g/dL  Hematocrit : 32.3 %  Platelet Count - Automated : 197 K/uL  Mean Cell Volume : 95.6 fl  Mean Cell Hemoglobin : 29.9 pg  Mean Cell Hemoglobin Concentration : 31.3 g/dL  Auto Neutrophil # : x  Auto Lymphocyte # : x  Auto Monocyte # : x  Auto Eosinophil # : x  Auto Basophil # : x  Auto Neutrophil % : x  Auto Lymphocyte % : x  Auto Monocyte % : x  Auto Eosinophil % : x  Auto Basophil % : x    12-21    137  |  104  |  23  ----------------------------<  111[H]  4.0   |  23  |  1.18    Ca    8.5      21 Dec 2024 07:53  Phos  3.8     12-21  Mg     1.8     12-21

## 2024-12-22 NOTE — PROGRESS NOTE ADULT - SUBJECTIVE AND OBJECTIVE BOX
HPI:  Unknown age male, unknown past medical history (reported stroke and MI by coworkers) presented to [facility] with AMS, Pt was working at [employer] when he was found down by coworkers. EMS called and pt brought to [facility] ED. Intubated, sedated, started on cardene for SBPs in 200s. CT head showed brain stem bleed. Transferred to NSICU for further management.  (30 Sep 2024 12:55)    OVERNIGHT EVENTS: GEORGE    Hospital Course:   9/30: transferred from [facility]. A line placed. Versed dc'd. Cy Rader at bedside, states pt has family in [place], cannot confirm medications or PMH other than stroke and MI. 250cc bolus 3% given. LR switched to NS. hydralazine 25q8 started, 3% started, switched propofol to precedex   10/1: stability CTH done. Added labetalol, started TF. Palliative consulted. ethics consulted to determine surrogate. febrile 103, pan cx sent  10/2: BD 2, GEORGE overnight. TF resumed. Desatt'd to 80s, FiO2 inc. to 50. Fentanyl given, ABG, CXR ordered. Maxxed on precedex, started on propofol for DARIEN -4 - -5. Precedex dc'd. Duonebs, mucomyst, hypertonic added. 3% dc'd. Cardene dc'd. Start vanc/CTX. Increased labetalol 200q8. MRSA negative, dc'd vanc. ETT pulled back 2cm x 2, good positioning after confirmatory chest xray. Ethics attempting to establish HCP with family. Na 159, starting FW 250q6 for range 150-155.   10/3: BD3, GEORGE o/n, neuro stable. Na elevating, FW increased to 300q6. Dc'd bowel reg for diarrhea. vEEG started. SQH 5000q8 tonight.   10/4: BD 4, albumn bolus, incr. LR to 80 2/2 incr. in Cr, LR to 100cc/hr for uptrending Cr. Started 7% hypersal for 48hrs and SL atropine for inline/oral thick secretions. Dc'd CTX and started ancef for MSSA in the sputum. Nephrology consulted for CKD, f/u recs. SBP 170s, given hydralazine 10mg IVP.   10/5: BD5, o/n 10mg IVP hydralazine given for SBP 170s and started on hydralazine 25q8 via OGT. 10mg IV push labetalol for SBP > 160s. RT placed for diarrhea.   10/6: BD6, o/n FW increased to 350q4 per nephrology recs. IV tylenol for temp 100.6, SBp 160s presumed uncomfortable.   10/7: BD7, overnight pancultured for temp 101.8F.   10/8: BD8. GEORGE. Cr bumped. decreased LR to 75cc/hr. Adding simethicone ATC. incr hydralazine 50mgTID. Incr labetalol 300mgTID. Na 145, decreased FWF to 250q6. Start precedex. FENa consistent with intrinsic kidney injury. Pend repeat renal US. Retaining up to 1.3L, bladder scans q6, straight cath PRN  10/9: BD 9. GEORGE overnight. Neuro stable. abd xray for distention w non-specific gas pattern, OGT to LIWS for morning. duonebs/mucomyst to q8 for improving secretions. Changed tube feeds to Jevity 1.5 20cc/hr, low rate due to abdominal distention, nepro dense and more difficult to digest. Tolerating CPAP, confirmed by ABG.   10/10: BD 10. GEORGE overnight. Neuro stable. (+) gabriel for urinary retention on bladder scan. inc TF to goal rate of 40cc/hr. family leaning toward pursuing trach/PEG. 1/2 amp for FS 81.   10/11: BD 11. GEORGE overnight. Neuro stable. Trach/PEG consults placed.   10/12: BD 12. GEORGE overnight. Neuro stable. MRI brain complete.   10/13: BD 13. Increase flomax. Hold SQH after PM dose for trach tm. IVL.   10/14: BD 14. GEORGE overnight, remains on AC/VC. Gabriel placed for urinary retention. Dc'd free water.  S/p trach with pulm. NGT placed and CXR confirmed in good position.   10/15: BD 15, GEORGE ovn. resumed feeds. spiked 101, pan cx sent.   10/16: BD 16. GEORGE ovn. Lokelma 5mg for K+ 5.4. Started vanc q 24/zosyn for empiric PNA coverage, IVF to 100/hr. PEG held for fever.   10/17: BD 17,  ordered serum osm and urine osm for am. Started sinemet for neurostimulation. Increased cardura to 0.8. Started FW 100q4, dc'd IVF. MRSA negative, dc'd vanc. NGT replaced d/t coiling.   10/18: BD 18, GEORGE overnight, neuro stable. Amantadine added for neurostim. zosyn changed to unasyn for acinetobacter baumannii, failed TOV and required SC  10/19: BD 19, GEORGE ovn. cardura 2mg added for retention. labetalol decreased 200q8, hydralazine decreased 25q8. Gabriel replaced.   10/20: BD20, GEORGE overnight. NGT dislodged, replaced. PEG tomorrow w/ gen surg, FW increased to 150q4 and labetalol decreased to 100q8, lokelma given for hyperkalemia.   10/21: BD 21. POD0 PEG placement with Gen surg. decr labetolol to 50q8, incr. cardura to 0.4, started lokelma and phoslo, dc gabriel POD0 PEG placement with Gen surg.  10/22: BD 22. Plan to start TF today via PEG. dc labetalol, Following ophtho recs. Increased apnea settings - found to be in cheyne-moe respiration. CPAP 5/5.  10/23: BD 23. hydralazine d/c'd, trach collar trial today. Rectal tube placed at 6am.  10/24: BD24, o/n lokelma held due to diarrhea. Free water 100q6 resumed. dc'd tamsulosin, amantadine. Incr'd cardura to 8mg qhs. Dc'd FW. Switched jevity to nepro. gabriel placed for high urine output. Started SL atropine for oral secretions. Dc'd free water.  10/25: BD25, o/n decreased suctioning requirements to > q4hrs, GEORGE. Cr improving, cont phoslo, lokelma held at this time. Gabriel placed yest, cont. Tolerating trach collar. Given 500cc plasmalyte bolus for ANIKA. Dc'd sinemet.   10/26: BD26, o/n resumed lokelma 5mg daily and resumed 100cc free water q6hrs. Change in neuro status with new right pupillary dilation with anisocoria (right pupil 6mm fixed and left pupil 3mm briskly reactive). Given 23.4% NaCl bullet, taken for emergent CTH showing mostly resolved pontine hemorrhage, continued brainstem hypodensity likely edema d/t hemorrhage, no new hemorrhage or infarct, no herniation, mild increase in size of left lateral ventricle. Vitals remaine stable. Na goal > 140.   10/27: BD27, o/n GEORGE.Neuro stable. Pend stepdown with airway bed.   10/28: BD 28. GEORGE overnight. Neuro stable. Miralax ordered. Gabriel removed, pending TOV.  10/29: BD 29. GEORGE o/n. Given 2L NS over 8 hrs for increased BUN/Cr ratio. Gabriel placed for frequent straight cath.   10/30: BD 30.   10/31: BD 31. GEORGE overnight. Na 149, increased free water to 200q6. 1L NS for uptrending BUN.   11/1: BD 32. GEORGE overnight. Given 1L NS for dehydration. Na 146, increased FW to 250q6.   11/2: BD 33 GEORGE overnight, neuro stable, given 500cc bolus for net negative status and tachycardia   11/3: BD 34, GEORGE overnight, neuro stable. Patient remains tachycardic, EKG showing sinus tachycardia, given additional 500cc NS bolus. Febrile to 101.9F, pan cultured (without UA), CXR WNL, given tylenol.   11/4: BD 35, GEORGE overnight, neuro stable. Given 1L NS for tachycardia. sputum (+) for stenotropohomas maltophilia.   11/5: BD 36 GEORGE overnight, neuro stable. Vancomycin dc'd. Chest PT BID. ID consulted, cont zosyn.  11/6: BD 37. blood cx + klebsiella dc zosyn changed to cefepime, CTAP ordered, rpt blood cx sent.    11/7: BD 38. Pending CT A/P, given 250cc bolus and starting maintenance fluids overnight. Pending CT A/P after bolus   11/8: BD 39. CT CAP negative for infection.   11/9: BD 40. GEORGE overnight.  11/10: BD 41. GEORGE overnight. desat to 85 on trach collar, O2 inc to 10L and 100%, O2 sat inc to 95. pt tachy to 110s, euvolemic. given tylenol. ABG and CXR ordered. spiked fever, pancultured, RVP negative. AM ABG w pO2 79, rpt w pO2 79. pt appears comfortable, satting 94%.   11/11: BD 42. GEORGE overnight. pt became tachy to 130s, desat to 90 on 100% FiO2 and 10L. suctioned, (+) productive cough. temp 101.4, given 1g IV tylenol and 500cc NS bolus for euvolemia. fever and HR downtrending. LE dopplers negative for dvt  11/12: BD 43, GEORGE ovn, fever and HR downtrending, satting 97% 70% FIO2  11/13: BD 44, GEORGE ovn. started standing tylenol x24 hours for tachycardia. desat to 80s, o2 increased. CXR stable, pending CTA PE protocol.   11/14: BD 45, GEORGE overnight, neuro stable. resp therapy dec FiO2 to 70%.   11/15: BD 46, GEORGE overnight, neuro stable.  Rapid called for desaturation 30s, tachycardic 140s. Patient bagged, 100% fio2, heavily suctioned. CXR/POCUS unremarkable. ABG c/w desaturation. WBC 14.71. Afebrile. O2 improved to 90s and patient upgraded to ICU. ABG paO2 30s improved to 89 on vent. IV Tylenol x 1, sputum sent. Start protonix while o-n vent.   11/16: BD 47. POCUS showed collapsable IVCF, given 1L bolus. Vanco/Cefpime added empriic for PNA, NGT feeds restarted. MRSA swab neg, Vanc DC'd.   11/17: BD 48. GEORGE overnight. 1l bolus for tachycardia. Spiked to 101, cultured. 500cc bolus for tachycardia, tachy to 148 given 25mcg fentanyl, 250cc albumin, 1.5L bolus. 5 IV lopressor with response HR to 100s. +Stenotrophomonas on sputum cx.   11/18: BD 49. GEORGE overnight. Consulted ID, cefepime switched to bactrim x7days. Started hydrochlorothiazine 12.5mg daily.  11/19: BD 50. Tachy 120s, given tylenol and 500cc NS. Tolerating 5/5, switched to TCx. Holding phos binder. D/c Bactrim. D/c gabriel, f/u TOV. Dc'd PPI.   11/20: BD 51. GEORGE ovn. 1600 satting low 90s, mildly tachy to 110s, afebrile, RR wnl. O2 improved to mid 90s while inc O2 to 100% on TCx. CPAP 5/5 placed back on.  11/21: BD 52, GEORGE ovn, tolerating CPAP 5/5. Switch to trach collar during the day if tolerating well. HCTZ held for Cr bump, straight cath frequence increased to q4  11/22: BD 53, GEORGE ovn. Resumed phoslo. Gabriel placed. Resumed HCTZ.   11/23: BD 54. Holding tylenol in setting of possible fever, will require pan cx if febrile. Cr improved today. Cont CPAP. Bowel regimen held i/s/o diarrhea. FOBT negative.  11/24: BD 55. GEORGE overnight. Neuro stable. HCTZ dc'ed, started lisinopril 5. Lokelma dc'ed for K 3.7.   11/25: BD 56, GEORGE overnight. Neuro stable. dc'd lisinopril 5mg. Gabriel dc'd. TOV. 1545 noted to be hypotensive, MAP 50, in supine position on chair, HR 60s, afebrile, O2 96%. Given 1L cc NS bolus, placed back on bed in reverse trendelenberg, improved to map of 66. Neostick at bedside. Vitals check q1h. Dc'ed amlodipine. Failed TOV, bladder scan q6, sc prn. Added back Senna.   11/26: BD 57, GEORGE overnight. Neuro stable. Dc'd phoslo.   11/27: BD 58, GEORGE overnight. Neuro stable.    11/28: BD 59. Gabriel replaced.   11/29: GEORGE.  11/30: GEORGE, neuro stable.   12/1: BD 62, GEORGE overnight  12/2: BD 63, GEORGE overnight.; Given 1L bolus of LR for uptrending BUN/Cr.  12/3: BD 64. Reinstated eye gtt/moisture chamber given increased Rt eye injection  12/4: BD 65. GEORGE overnight. Attempted to speak with ophtho regarding eyelid weight/closure but no answer, full mailbox.   12/5: BD 66, GEORGE overnight, bowel regimen increased and had BM.   12/6: BD 67, GEORGE overnight, neuro stable.  12/7: GEORGE overnight, neuro stable. /110s, given x1 hydralazine 10 mg IVP. Restarted home amlodipine 5mg.  12/8: GEORGE. OOB to chair.     12/11: GEORGE, mucomyst added for thick secretions, simethicone for abd distension, abd xray with stool burden, increased bowel regimen.   12/12: GEORGE overnight.   12/13: GEORGE overnight.   12/14: GEORGE overnight  12/15: o/n Patient became tachycardic HR 120s and 10 minutes later O2 sat dropped as low as 89%. Patient suctioned without improvement in O2 sat and tube feeds found in suction catheter. TFs held.  STAT CXR ordered. STAT labs sent. Respiratory therapist called to bedside and patient trach connected to ventilator. After connection to ventilator and further suctioning O2 sat improved to 97% but patient HR remains 120-130s. Upgraded to NSICU for further management. Vancomycin and zosyn started. CTA  chest PE protocol and CTH ordered. Blood cultures sent.given 500cc bolus, rpt ABG sent pO2 243, CTH and CTA chest done. FS while NPO, FiO2 dec 50 pending ABG. sputum cx positive for few GPC and GNR.   12/16: GEORGE overnight, restarted amlodipine, troponin 75   12/17: GEORGE overnight, neuro stable. Attempted CPAP this morning, did not tolerate and back on full vent support. Dc'd Vanc. Tachycardia to 140s, noted extremities to be twitching along with jaw twitching. Given 2g of Keppra, total 4mg ativan and placed on EEG, full set of labs and lactate negative. Resumed trickle feeds at 20cc/hr. Given 250cc albumin for tachycardia.   12/18: GEORGE overnight. Neuro stable. CTH stable. EEG negative and dc'd.   12/19: GEORGE overnight. neuro stable. SIMV most of day, AC/VC at night.   12/20: GEORGE overnight, neuro stable. remains on VC/AC  12/21: GEORGE ovn. 1L bolus for tachy to 120s-130s.   12/22: GEORGE ovn    Vital Signs Last 24 Hrs  T(C): 37.1 (21 Dec 2024 22:05), Max: 37.8 (21 Dec 2024 13:38)  T(F): 98.8 (21 Dec 2024 22:05), Max: 100.1 (21 Dec 2024 13:38)  HR: 96 (21 Dec 2024 23:52) (96 - 128)  BP: 139/97 (21 Dec 2024 23:52) (121/85 - 139/97)  BP(mean): 111 (21 Dec 2024 23:52) (94 - 111)  RR: 16 (21 Dec 2024 23:52) (14 - 18)  SpO2: 97% (21 Dec 2024 23:52) (95% - 98%)    Parameters below as of 21 Dec 2024 23:52  Patient On (Oxygen Delivery Method): ventilator    O2 Concentration (%): 40    I&O's Summary    20 Dec 2024 07:01  -  21 Dec 2024 07:00  --------------------------------------------------------  IN: 1785 mL / OUT: 1775 mL / NET: 10 mL    21 Dec 2024 07:01  -  22 Dec 2024 00:18  --------------------------------------------------------  IN: 1650 mL / OUT: 1015 mL / NET: 635 mL        PHYSICAL EXAM:  GEN: laying in bed, NAD  NEURO: AOx3. FC, OE spont, face symmetric. CNII-XII intact. PERRL, vertical EOMs only. RUE shows 2 fingers, RLE wiggles toes. LUE/LLE w/d.   CV: RRR +S1/S2  PULM: CTAB  GI: Abd soft, NT/ND  EXT: ext warm, dry, nontender    TUBES/LINES:  [] Gabriel  [] Lumbar Drain  [] Wound Drains  [] Others      DIET:  [] NPO  [] Mechanical  [x] Tube feeds    LABS:                        10.1   7.41  )-----------( 197      ( 21 Dec 2024 07:53 )             32.3     12-21    137  |  104  |  23  ----------------------------<  111[H]  4.0   |  23  |  1.18    Ca    8.5      21 Dec 2024 07:53  Phos  3.8     12-21  Mg     1.8     12-21        Urinalysis Basic - ( 21 Dec 2024 07:53 )    Color: x / Appearance: x / SG: x / pH: x  Gluc: 111 mg/dL / Ketone: x  / Bili: x / Urobili: x   Blood: x / Protein: x / Nitrite: x   Leuk Esterase: x / RBC: x / WBC x   Sq Epi: x / Non Sq Epi: x / Bacteria: x          CAPILLARY BLOOD GLUCOSE      POCT Blood Glucose.: 102 mg/dL (21 Dec 2024 12:30)      Drug Levels: [] N/A  Vancomycin Level, Trough: 37.3 ug/mL (12-16 @ 16:23)    CSF Analysis: [] N/A      Allergies    Allergy Status Unknown    Intolerances      MEDICATIONS:  Antibiotics:    Neuro:  acetaminophen   Oral Liquid .. 650 milliGRAM(s) Oral every 6 hours PRN  levETIRAcetam  Solution 1000 milliGRAM(s) Oral every 12 hours  oxyCODONE    Solution 5 milliGRAM(s) Oral every 4 hours PRN    Anticoagulation:  heparin   Injectable 5000 Unit(s) SubCutaneous every 8 hours    OTHER:  acetylcysteine 10%  Inhalation 4 milliLiter(s) Inhalation every 6 hours  albuterol/ipratropium for Nebulization 3 milliLiter(s) Nebulizer every 6 hours  amLODIPine   Tablet 5 milliGRAM(s) Oral daily  artificial tears (preservative free) Ophthalmic Solution 1 Drop(s) Right EYE every 4 hours  chlorhexidine 0.12% Liquid 15 milliLiter(s) Oral Mucosa every 12 hours  chlorhexidine 2% Cloths 1 Application(s) Topical <User Schedule>  doxazosin 8 milliGRAM(s) Oral at bedtime  erythromycin   Ointment 1 Application(s) Right EYE at bedtime  fluticasone propionate 50 MICROgram(s)/spray Nasal Spray 1 Spray(s) Both Nostrils two times a day  ofloxacin 0.3% Solution 1 Drop(s) Right EYE four times a day  pantoprazole  Injectable 40 milliGRAM(s) IV Push daily  petrolatum Ophthalmic Ointment 1 Application(s) Both EYES two times a day  sodium chloride 0.65% Nasal 1 Spray(s) Both Nostrils two times a day    IVF:    CULTURES:  Culture Results:   Moderate Stenotrophomonas maltophilia  Commensal iram consistent with body site (12-15 @ 05:06)  Culture Results:   No growth at 5 days (12-15 @ 01:03)    RADIOLOGY & ADDITIONAL TESTS:      ASSESSMENT:  43 y/o PMH ?stroke/MI present to [facility] after collapsing at work. Decorticate posturing, vomiting, intubated for airway protection. 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 Transferred to [facility] for further management. s/p trach 10/14. s/p peg 10/21. Re-upgrade to ICU 2/2 desaturation event and suctioning requirements 11/15.     AMS    Handoff    MEWS Score    Acute myocardial infarction    CVA (cerebral vascular accident)    Intracerebral hemorrhage of brain stem    Brainstem stroke    Brain stem stroke syndrome    Brain stem hemorrhage    Brain stem stroke syndrome    Hemorrhagic stroke    Brainstem stroke    Encephalopathy acute    Functional quadriplegia    Advanced care planning/counseling discussion    Encounter for palliative care    Pontine hemorrhage    Neurogenic dysphagia    Chronic respiratory failure    Acute kidney injury superimposed on CKD    Acute urinary retention    Hypertensive emergency    Sepsis, unspecified organism    Sepsis    Gram-negative bacteremia    Percutaneous tracheal puncture    Altered mental status examination    EGD, with PEG    AMS    [email or web address]        PLAN:  Neuro:  - neuro/vitals q4h  - pain control: tylenol prn  - seizure tx: keppra 1000mg BID   - vEEG (10/3-4) negative, (10/17-10/19) negative, (12/17-12/18) negative  - CTH 9/30: enlarged pontine hemorrhage, CTH 10/3: stable, CTH 10/25: mostly resolved pontine hemorrhage, CTH 12/15: R mastoid air cell opacification; acute otitis media vs sterile effusion, CTH 12/18: stable.  - MRI brain 10/12: parenchymal hemorrhage, acute/subacute R cerebellar stroke    - stroke core measures, stroke neuro signed off    CV:  - -160  - HTN: amlodipine 5mg, hold lisinopril 5mg   - echo (9/30) EF 75%, repeat 12/16: 57%    Resp:  - trach to vent, AC/VC 40/400/14/6  - CTA PE protocol 12/15 negative   - Secretions: duonebs/mucomyst/chest PT q6h     GI:  - TF via PEG (placed 10/21 by gen surg)  - bowel regimen held for loose stool, last BM 12/19    Renal:  - IVL  - Urinary retenion: Gabriel replaced 12/15  - CKD: trend BUN/Cr  - renal US 10/1: echogenicity c/w chronic med renal dz, repeat 10/8: inc renal echogeicity, c/f medical renal disease w increased hydro     Endo:  - A1c 5.4    Heme:  - DVT ppx: SCDs, SQH 5000u q8h   - LE dopplers negative 12/16    ID:  - empiric PNA: zosyn (12/15-12/20), s/p vanc (12/15-12/16)  - blood and sputum cultures 12/15  - last pancx 11/16, MRSA swab neg 11/16   - s/p Stenotrophomonas maltophilia PNA: s/p Bactrim (11/18-11/19) s/p Cefepime (11/16-11/18)  - 11/3, (+) sputum for stenotrophomas maltophlia, blood cx (+) klebsiella, cefepime 2gq12 (11/6 - 11/12)   - empiric tx: s/p zosyn (11/3-11/6) , s/p vanc  (11/3-11/5)  - S/p Ancef (10/4-10/14) for PNA, and s/p Unasyn (10/18-10/23) +actinobacter baumanii     MISC:  - ophtho consult for keratitis  - erythromycin ointment R eye q4hrs, ofloxacin ointment R eye QID, artificial tears R eye q2hrs, moisture chamber at bedtime    Dispo: SDU status, full code, pending placement and guardianship hearing     D/w Dr. D'Amico     Assessment:  Present when checked    []  GCS  E   V  M     Heart Failure: []Acute, [] acute on chronic , []chronic  Heart Failure:  [] Diastolic (HFpEF), [] Systolic (HFrEF), []Combined (HFpEF and HFrEF), [] RHF, [] Pulm HTN, [] Other    [] ANIKA, [] ATN, [] AIN, [] other  [] CKD1, [] CKD2, [] CKD 3, [] CKD 4, [] CKD 5, []ESRD    Encephalopathy: [] Metabolic, [] Hepatic, [] toxic, [] Neurological, [] Other    Abnormal Nurtitional Status: [] malnurtition (see nutrition note), [ ]underweight: BMI < 19, [] morbid obesity: BMI >40, [] Cachexia    [] Sepsis  [] hypovolemic shock,[] cardiogenic shock, [] hemorrhagic shock, [] neuogenic shock  [] Acute Respiratory Failure  []Cerebral edema, [] Brain compression/ herniation,   [] Functional quadriplegia  [] Acute blood loss anemia

## 2024-12-22 NOTE — CONSULT NOTE ADULT - REASON FOR ADMISSION
brain stem bleed

## 2024-12-22 NOTE — PROGRESS NOTE ADULT - ASSESSMENT
44M with unclear PMH (?stroke, MI) who was found down at work, intubated for airway protection and found to have acute parenchymal hemorrhage within nabil with mass effect (+ acute/subacute right cerebellar infarct) in setting of hypertensive emergency, transferred to [facility] for further neurosurgical care. Hospital course c/b poor neurologic recovery s/p trach-PEG, AUR s/p gabriel, ANIKA on CKD c/b hyperkalemia, HAP s/p amp-sulbactam (EOT 10/23), K aerogenes bacteremia  treated with 2 weeks of Cefepime per ID , On 11/15 he became septic and was transferred to NSICU due to increased o2 requirements and needed Vent support , Trach Cultures + for Stenotrophomonas which was treated with 7 days of Bactrim per ID , has been weaned of Vent since 11/23 and is now on 10lts at 40% o2 through Trach Collar. Stepped up to the NSICU on 12/15 for hypoxia and tachycardia. PE ruled out. On abx for 5 days. Unclear etiology. Now stepped down to [facility].    # Pontine hemorrhage  # Encephalopathy due to Intracranial Bleed   - Acute parenchymal hemorrhage within nabil with mass effect (+ acute/subacute right cerebellar infarct) in setting of hypertensive emergency.   - MRI brain also demonstrated acute/subacute R cerebellar stroke.   - No Neurosurgical Interventions were performed   - due to IPH and cerebellar stroke patient has become Trach and Peg Dependent    #Fever  - unclear etiology; CXR with improving opacities  - f/u labs today  - obtain RVP, Covid, blood cx, u/a, urine cx    # Hypertension  - c/w amlodipine 5mg daily, BPs now better     # Acute kidney injury  # Acute Tubular Necrosis superimposed on CKD. 3  - Pt s/p US renal with chronic medical renal disease  - Elevated BUN , Not on any meds that can elevate BUN , Hgb Stable   - Cr 1.18 within pt baseline range, ctm     # Chronic respiratory failure.   -Pt s/p percutaneous trach by pulm on 10/14/24  - Continue trach collar   - Continue routine trach care and suctioning PRN  - Chest PT    # Neurogenic dysphagia.   -Pt s/p PEG with surgery 10/21/24  - TF per nutrition  - aspiration precautions and elevation of HOB.    #Acute urinary retention.   - doxazosin 8mg    # Functional quadriplegia in  setting of brainstem hemorrhage  - decub precautions  - care per nursing protocol     # DVT prop  - Heparin SQ q8     # Dispo:  [facility] 44M with unclear PMH (?stroke, MI) who was found down at work, intubated for airway protection and found to have acute parenchymal hemorrhage within nabil with mass effect (+ acute/subacute right cerebellar infarct) in setting of hypertensive emergency, transferred to [facility] for further neurosurgical care. Hospital course c/b poor neurologic recovery s/p trach-PEG, AUR s/p gabriel, ANIKA on CKD c/b hyperkalemia, HAP s/p amp-sulbactam (EOT 10/23), K aerogenes bacteremia  treated with 2 weeks of Cefepime per ID , On 11/15 he became septic and was transferred to NSICU due to increased o2 requirements and needed Vent support , Trach Cultures + for Stenotrophomonas which was treated with 7 days of Bactrim per ID , has been weaned of Vent since 11/23 and is now on 10lts at 40% o2 through Trach Collar. Stepped up to the NSICU on 12/15 for hypoxia and tachycardia. PE ruled out. On abx for 5 days. Unclear etiology. Now stepped down to [facility].    # Pontine hemorrhage  # Encephalopathy due to Intracranial Bleed   - Acute parenchymal hemorrhage within nabil with mass effect (+ acute/subacute right cerebellar infarct) in setting of hypertensive emergency.   - MRI brain also demonstrated acute/subacute R cerebellar stroke.   - No Neurosurgical Interventions were performed   - due to IPH and cerebellar stroke patient has become Trach and Peg Dependent    #Fever/tachycardia  - unclear etiology; CXR with improving opacities  - tele w/ sinus tachycardia to the 130's  - f/u labs today  - obtain RVP, Covid, blood cx, u/a, urine cx    # Hypertension  - c/w amlodipine 5mg daily, BPs now better     # Acute kidney injury  # Acute Tubular Necrosis superimposed on CKD. 3  - Pt s/p US renal with chronic medical renal disease  - Elevated BUN , Not on any meds that can elevate BUN , Hgb Stable   - Cr 1.18 within pt baseline range, ctm     # Chronic respiratory failure.   -Pt s/p percutaneous trach by pulm on 10/14/24  - Continue trach collar   - Continue routine trach care and suctioning PRN  - Chest PT    # Neurogenic dysphagia.   -Pt s/p PEG with surgery 10/21/24  - TF per nutrition  - aspiration precautions and elevation of HOB.    #Acute urinary retention.   - doxazosin 8mg    # Functional quadriplegia in  setting of brainstem hemorrhage  - decub precautions  - care per nursing protocol     # DVT prop  - Heparin SQ q8     # Dispo:  8[facility] 44M with unclear PMH (?stroke, MI) who was found down at work, intubated for airway protection and found to have acute parenchymal hemorrhage within nabil with mass effect (+ acute/subacute right cerebellar infarct) in setting of hypertensive emergency, transferred to [facility] for further neurosurgical care. Hospital course c/b poor neurologic recovery s/p trach-PEG, AUR s/p gabriel, ANIKA on CKD c/b hyperkalemia, HAP s/p amp-sulbactam (EOT 10/23), K aerogenes bacteremia  treated with 2 weeks of Cefepime per ID , On 11/15 he became septic and was transferred to NSICU due to increased o2 requirements and needed Vent support , Trach Cultures + for Stenotrophomonas which was treated with 7 days of Bactrim per ID , has been weaned of Vent since 11/23 and is now on 10lts at 40% o2 through Trach Collar. Stepped up to the NSICU on 12/15 for hypoxia and tachycardia. PE ruled out. On abx for 5 days. Unclear etiology. Now stepped down to [facility].    # Pontine hemorrhage  # Encephalopathy due to Intracranial Bleed   - Acute parenchymal hemorrhage within nabil with mass effect (+ acute/subacute right cerebellar infarct) in setting of hypertensive emergency.   - MRI brain also demonstrated acute/subacute R cerebellar stroke.   - No Neurosurgical Interventions were performed   - due to IPH and cerebellar stroke patient has become Trach and Peg Dependent    #Seizures  -L facial twitching noted, keppra increased to 1500mg BID   -f/u vEEG  -f/u CT head  -epilepsy recs appreciated    #Fever/tachycardia  - unclear etiology; CXR with improving opacities  - tele w/ sinus tachycardia to the 130's  - f/u labs today  - obtain RVP, Covid, blood cx, u/a, urine cx    # Hypertension  - c/w amlodipine 5mg daily, BPs now better     # Acute kidney injury  # Acute Tubular Necrosis superimposed on CKD. 3  - Pt s/p US renal with chronic medical renal disease  - Elevated BUN , Not on any meds that can elevate BUN , Hgb Stable   - Cr 1.18 within pt baseline range, ctm     # Chronic respiratory failure.   -Pt s/p percutaneous trach by pulm on 10/14/24  - Continue trach collar   - Continue routine trach care and suctioning PRN  - Chest PT    # Neurogenic dysphagia.   -Pt s/p PEG with surgery 10/21/24  - TF per nutrition  - aspiration precautions and elevation of HOB.    #Acute urinary retention.   - doxazosin 8mg    # Functional quadriplegia in  setting of brainstem hemorrhage  - decub precautions  - care per nursing protocol     # DVT prop  - Heparin SQ q8     # Dispo:  8[facility]ach

## 2024-12-22 NOTE — CONSULT NOTE ADULT - CONSULT REQUESTED BY NAME
Dr. D'Amico
D'Amico
D'Amico, Randy
Dr D'Amico, Randy
Dr. Llanes
ENDY Hoyos, PA
Neuro Sx
Dr. D'Amico
Neurosurgery team
CT surgery
NSIUC

## 2024-12-22 NOTE — PROGRESS NOTE ADULT - SUBJECTIVE AND OBJECTIVE BOX
INTERVAL EVENTS: Febrile yesterday afternoon.     REVIEW OF SYSTEMS:  Constitutional:     [ ] negative [X] fevers [ ] chills [ ] weight loss [ ] weight gain  HEENT:                  [ ] negative [ ] dry eyes [ ] eye irritation [ ] postnasal drip [ ] nasal congestion  CV:                         [ ] negative  [ ] chest pain [ ] orthopnea [ ] palpitations [ ] murmur  Resp:                     [ ] negative [ ] cough [ ] shortness of breath [ ] wheezing [ ] sputum [ ] hemoptysis  GI:                          [ ] negative [ ] nausea [ ] vomiting [ ] diarrhea [ ] constipation [ ] abd pain [ ] dysphagia   :                        [ ] negative [ ] dysuria [ ] nocturia [ ] hematuria [ ] increased urinary frequency  MSK:                      [ ] negative [ ] back pain [ ] myalgias [ ] arthralgias [ ] fracture  Skin:                       [ ] negative [ ] rash [ ] itch  Neuro:                   [ ] negative [ ] headache [ ] dizziness [ ] syncope [ ] weakness [ ] numbness  Psych:                    [ ] negative [ ] anxiety [ ] depression  Endo:                     [ ] negative [ ] diabetes [ ] thyroid problem  Heme/Lymph:      [ ] negative [ ] anemia [ ] bleeding problem  Allergic/Immune: [ ] negative [ ] itchy eyes [ ] nasal discharge [ ] hives [ ] angioedema    [ ] All other systems negative or otherwise described above.  [X] Unable to assess ROS due to critical illness    PAST MEDICAL & SURGICAL HISTORY:  Acute myocardial infarction    CVA (cerebral vascular accident)      MEDICATIONS  (STANDING):  acetylcysteine 10%  Inhalation 4 milliLiter(s) Inhalation every 6 hours  albuterol/ipratropium for Nebulization 3 milliLiter(s) Nebulizer every 6 hours  amLODIPine   Tablet 5 milliGRAM(s) Oral daily  artificial tears (preservative free) Ophthalmic Solution 1 Drop(s) Right EYE every 4 hours  chlorhexidine 0.12% Liquid 15 milliLiter(s) Oral Mucosa every 12 hours  chlorhexidine 2% Cloths 1 Application(s) Topical <User Schedule>  doxazosin 8 milliGRAM(s) Oral at bedtime  erythromycin   Ointment 1 Application(s) Right EYE at bedtime  fluticasone propionate 50 MICROgram(s)/spray Nasal Spray 1 Spray(s) Both Nostrils two times a day  heparin   Injectable 5000 Unit(s) SubCutaneous every 8 hours  levETIRAcetam 1500 milliGRAM(s) Oral two times a day  ofloxacin 0.3% Solution 1 Drop(s) Right EYE four times a day  pantoprazole  Injectable 40 milliGRAM(s) IV Push daily  petrolatum Ophthalmic Ointment 1 Application(s) Both EYES two times a day  propranolol 10 milliGRAM(s) Oral every 8 hours  sodium chloride 0.65% Nasal 1 Spray(s) Both Nostrils two times a day    MEDICATIONS  (PRN):  acetaminophen   Oral Liquid .. 650 milliGRAM(s) Oral every 6 hours PRN Temp greater or equal to 38.5C (101.3F), Mild Pain (1 - 3)  oxyCODONE    Solution 5 milliGRAM(s) Oral every 4 hours PRN Moderate Pain (4 - 6)    ICU Vital Signs Last 24 Hrs  T(C): 37.3 (22 Dec 2024 09:00), Max: 37.8 (21 Dec 2024 13:38)  T(F): 99.2 (22 Dec 2024 09:00), Max: 100.1 (21 Dec 2024 13:38)  HR: 116 (22 Dec 2024 08:42) (96 - 116)  BP: 125/88 (22 Dec 2024 08:40) (122/86 - 139/97)  BP(mean): 102 (22 Dec 2024 08:40) (98 - 111)  ABP: --  ABP(mean): --  RR: 18 (22 Dec 2024 08:42) (14 - 18)  SpO2: 96% (22 Dec 2024 08:42) (95% - 98%)    O2 Parameters below as of 22 Dec 2024 08:42  Patient On (Oxygen Delivery Method): ventilator    O2 Concentration (%): 40      Orthostatic VS    Daily     Daily   I&O's Summary    21 Dec 2024 07:01  -  22 Dec 2024 07:00  --------------------------------------------------------  IN: 2140 mL / OUT: 1715 mL / NET: 425 mL        PHYSICAL EXAM:  General: comfortable-appearing, no WOB on trach collar  HEENT: tracheostomy clean  Lungs: no crackles, no wheezes  Heart: regular  Abdomen: soft, no visible tenderness, + BS  Extremities: warm, no edema, not moving to command    LABS:                        10.1   7.41  )-----------( 197      ( 21 Dec 2024 07:53 )             32.3       12-21    137  |  104  |  23  ----------------------------<  111[H]  4.0   |  23  |  1.18    Ca    8.5      21 Dec 2024 07:53  Phos  3.8     12-21  Mg     1.8     12-21            Urinalysis Basic - ( 21 Dec 2024 07:53 )    Color: x / Appearance: x / SG: x / pH: x  Gluc: 111 mg/dL / Ketone: x  / Bili: x / Urobili: x   Blood: x / Protein: x / Nitrite: x   Leuk Esterase: x / RBC: x / WBC x   Sq Epi: x / Non Sq Epi: x / Bacteria: x          RADIOLOGY & ADDITIONAL STUDIES: Reviewed

## 2024-12-23 LAB
ANION GAP SERPL CALC-SCNC: 8 MMOL/L — SIGNIFICANT CHANGE UP (ref 5–17)
BASOPHILS # BLD AUTO: 0.07 K/UL — SIGNIFICANT CHANGE UP (ref 0–0.2)
BASOPHILS NFR BLD AUTO: 1 % — SIGNIFICANT CHANGE UP (ref 0–2)
BUN SERPL-MCNC: 33 MG/DL — HIGH (ref 7–23)
CALCIUM SERPL-MCNC: 8.7 MG/DL — SIGNIFICANT CHANGE UP (ref 8.4–10.5)
CHLORIDE SERPL-SCNC: 101 MMOL/L — SIGNIFICANT CHANGE UP (ref 96–108)
CO2 SERPL-SCNC: 25 MMOL/L — SIGNIFICANT CHANGE UP (ref 22–31)
CREAT SERPL-MCNC: 1.25 MG/DL — SIGNIFICANT CHANGE UP (ref 0.5–1.3)
EGFR: 72 ML/MIN/1.73M2 — SIGNIFICANT CHANGE UP
EGFR: 72 ML/MIN/1.73M2 — SIGNIFICANT CHANGE UP
EOSINOPHIL # BLD AUTO: 0.32 K/UL — SIGNIFICANT CHANGE UP (ref 0–0.5)
EOSINOPHIL NFR BLD AUTO: 4.7 % — SIGNIFICANT CHANGE UP (ref 0–6)
GLUCOSE SERPL-MCNC: 100 MG/DL — HIGH (ref 70–99)
HCT VFR BLD CALC: 29.7 % — LOW (ref 39–50)
HGB BLD-MCNC: 9.6 G/DL — LOW (ref 13–17)
IMM GRANULOCYTES NFR BLD AUTO: 1.5 % — HIGH (ref 0–0.9)
LYMPHOCYTES # BLD AUTO: 1.59 K/UL — SIGNIFICANT CHANGE UP (ref 1–3.3)
LYMPHOCYTES # BLD AUTO: 23.5 % — SIGNIFICANT CHANGE UP (ref 13–44)
MAGNESIUM SERPL-MCNC: 2 MG/DL — SIGNIFICANT CHANGE UP (ref 1.6–2.6)
MCHC RBC-ENTMCNC: 30.7 PG — SIGNIFICANT CHANGE UP (ref 27–34)
MCHC RBC-ENTMCNC: 32.3 G/DL — SIGNIFICANT CHANGE UP (ref 32–36)
MCV RBC AUTO: 94.9 FL — SIGNIFICANT CHANGE UP (ref 80–100)
MONOCYTES # BLD AUTO: 0.61 K/UL — SIGNIFICANT CHANGE UP (ref 0–0.9)
MONOCYTES NFR BLD AUTO: 9 % — SIGNIFICANT CHANGE UP (ref 2–14)
NEUTROPHILS # BLD AUTO: 4.09 K/UL — SIGNIFICANT CHANGE UP (ref 1.8–7.4)
NEUTROPHILS NFR BLD AUTO: 60.3 % — SIGNIFICANT CHANGE UP (ref 43–77)
NRBC # BLD: 0 /100 WBCS — SIGNIFICANT CHANGE UP (ref 0–0)
NRBC BLD-RTO: 0 /100 WBCS — SIGNIFICANT CHANGE UP (ref 0–0)
PHOSPHATE SERPL-MCNC: 3.1 MG/DL — SIGNIFICANT CHANGE UP (ref 2.5–4.5)
PLATELET # BLD AUTO: 192 K/UL — SIGNIFICANT CHANGE UP (ref 150–400)
POTASSIUM SERPL-MCNC: 3.7 MMOL/L — SIGNIFICANT CHANGE UP (ref 3.5–5.3)
POTASSIUM SERPL-SCNC: 3.7 MMOL/L — SIGNIFICANT CHANGE UP (ref 3.5–5.3)
PROCALCITONIN SERPL-MCNC: 0.66 NG/ML — HIGH (ref 0.02–0.1)
RBC # BLD: 3.13 M/UL — LOW (ref 4.2–5.8)
RBC # FLD: 14.7 % — HIGH (ref 10.3–14.5)
SODIUM SERPL-SCNC: 134 MMOL/L — LOW (ref 135–145)
WBC # BLD: 6.78 K/UL — SIGNIFICANT CHANGE UP (ref 3.8–10.5)
WBC # FLD AUTO: 6.78 K/UL — SIGNIFICANT CHANGE UP (ref 3.8–10.5)

## 2024-12-23 PROCEDURE — 99233 SBSQ HOSP IP/OBS HIGH 50: CPT

## 2024-12-23 PROCEDURE — 99231 SBSQ HOSP IP/OBS SF/LOW 25: CPT

## 2024-12-23 PROCEDURE — 95720 EEG PHY/QHP EA INCR W/VEEG: CPT

## 2024-12-23 RX ORDER — CEFEPIME 2 G/20ML
2000 INJECTION, POWDER, FOR SOLUTION INTRAVENOUS EVERY 8 HOURS
Refills: 0 | Status: DISCONTINUED | OUTPATIENT
Start: 2024-12-23 | End: 2024-12-27

## 2024-12-23 RX ADMIN — Medication 166.67 MILLIGRAM(S): at 09:43

## 2024-12-23 RX ADMIN — FOSPHENYTOIN SODIUM 104 MILLIGRAM(S) PE: 50 INJECTION INTRAMUSCULAR; INTRAVENOUS at 01:15

## 2024-12-23 RX ADMIN — CEFEPIME 100 MILLIGRAM(S): 2 INJECTION, POWDER, FOR SOLUTION INTRAVENOUS at 13:23

## 2024-12-23 RX ADMIN — AMLODIPINE BESYLATE 5 MILLIGRAM(S): 10 TABLET ORAL at 06:28

## 2024-12-23 RX ADMIN — ERYTHROMYCIN 1 APPLICATION(S): 5 OINTMENT OPHTHALMIC at 22:26

## 2024-12-23 RX ADMIN — Medication 1 DROP(S): at 13:27

## 2024-12-23 RX ADMIN — CEFEPIME 100 MILLIGRAM(S): 2 INJECTION, POWDER, FOR SOLUTION INTRAVENOUS at 06:26

## 2024-12-23 RX ADMIN — DOXAZOSIN MESYLATE 8 MILLIGRAM(S): 8 TABLET ORAL at 22:26

## 2024-12-23 RX ADMIN — FOSPHENYTOIN SODIUM 104 MILLIGRAM(S) PE: 50 INJECTION INTRAMUSCULAR; INTRAVENOUS at 21:59

## 2024-12-23 RX ADMIN — Medication 40 MILLIEQUIVALENT(S): at 09:42

## 2024-12-23 RX ADMIN — LEVETIRACETAM 1500 MILLIGRAM(S): 10 INJECTION, SOLUTION INTRAVENOUS at 17:56

## 2024-12-23 RX ADMIN — Medication 40 MILLIGRAM(S): at 13:22

## 2024-12-23 RX ADMIN — FLUTICASONE PROPIONATE 1 SPRAY(S): 50 SPRAY, METERED NASAL at 17:56

## 2024-12-23 RX ADMIN — CEFEPIME 100 MILLIGRAM(S): 2 INJECTION, POWDER, FOR SOLUTION INTRAVENOUS at 21:58

## 2024-12-23 RX ADMIN — OFLOXACIN 1 DROP(S): 3 SOLUTION OPHTHALMIC at 01:16

## 2024-12-23 RX ADMIN — ACETYLCYSTEINE 4 MILLILITER(S): 200 INHALANT RESPIRATORY (INHALATION) at 16:31

## 2024-12-23 RX ADMIN — IPRATROPIUM BROMIDE AND ALBUTEROL SULFATE 3 MILLILITER(S): .5; 2.5 SOLUTION RESPIRATORY (INHALATION) at 03:45

## 2024-12-23 RX ADMIN — LEVETIRACETAM 1500 MILLIGRAM(S): 10 INJECTION, SOLUTION INTRAVENOUS at 06:27

## 2024-12-23 RX ADMIN — Medication 166.67 MILLIGRAM(S): at 21:58

## 2024-12-23 RX ADMIN — OFLOXACIN 1 DROP(S): 3 SOLUTION OPHTHALMIC at 13:28

## 2024-12-23 RX ADMIN — ACETYLCYSTEINE 4 MILLILITER(S): 200 INHALANT RESPIRATORY (INHALATION) at 21:55

## 2024-12-23 RX ADMIN — Medication 1 DROP(S): at 09:42

## 2024-12-23 RX ADMIN — ACETYLCYSTEINE 4 MILLILITER(S): 200 INHALANT RESPIRATORY (INHALATION) at 09:42

## 2024-12-23 RX ADMIN — Medication 1 SPRAY(S): at 19:39

## 2024-12-23 RX ADMIN — Medication 1 APPLICATION(S): at 06:33

## 2024-12-23 RX ADMIN — IPRATROPIUM BROMIDE AND ALBUTEROL SULFATE 3 MILLILITER(S): .5; 2.5 SOLUTION RESPIRATORY (INHALATION) at 21:55

## 2024-12-23 RX ADMIN — HEPARIN SODIUM 5000 UNIT(S): 1000 INJECTION INTRAVENOUS; SUBCUTANEOUS at 06:27

## 2024-12-23 RX ADMIN — HEPARIN SODIUM 5000 UNIT(S): 1000 INJECTION INTRAVENOUS; SUBCUTANEOUS at 13:22

## 2024-12-23 RX ADMIN — Medication 1 DROP(S): at 17:55

## 2024-12-23 RX ADMIN — FLUTICASONE PROPIONATE 1 SPRAY(S): 50 SPRAY, METERED NASAL at 06:33

## 2024-12-23 RX ADMIN — Medication 15 MILLILITER(S): at 06:28

## 2024-12-23 RX ADMIN — Medication 15 MILLILITER(S): at 17:56

## 2024-12-23 RX ADMIN — OFLOXACIN 1 DROP(S): 3 SOLUTION OPHTHALMIC at 17:56

## 2024-12-23 RX ADMIN — Medication 1 DROP(S): at 06:32

## 2024-12-23 RX ADMIN — IPRATROPIUM BROMIDE AND ALBUTEROL SULFATE 3 MILLILITER(S): .5; 2.5 SOLUTION RESPIRATORY (INHALATION) at 09:42

## 2024-12-23 RX ADMIN — ACETYLCYSTEINE 4 MILLILITER(S): 200 INHALANT RESPIRATORY (INHALATION) at 03:44

## 2024-12-23 RX ADMIN — Medication 1 APPLICATION(S): at 06:28

## 2024-12-23 RX ADMIN — Medication 1 SPRAY(S): at 06:32

## 2024-12-23 RX ADMIN — OFLOXACIN 1 DROP(S): 3 SOLUTION OPHTHALMIC at 06:32

## 2024-12-23 RX ADMIN — CEFEPIME 100 MILLIGRAM(S): 2 INJECTION, POWDER, FOR SOLUTION INTRAVENOUS at 13:27

## 2024-12-23 RX ADMIN — IPRATROPIUM BROMIDE AND ALBUTEROL SULFATE 3 MILLILITER(S): .5; 2.5 SOLUTION RESPIRATORY (INHALATION) at 16:31

## 2024-12-23 RX ADMIN — Medication 1 DROP(S): at 21:55

## 2024-12-23 RX ADMIN — FOSPHENYTOIN SODIUM 104 MILLIGRAM(S) PE: 50 INJECTION INTRAMUSCULAR; INTRAVENOUS at 13:22

## 2024-12-23 RX ADMIN — Medication 1 APPLICATION(S): at 17:55

## 2024-12-23 RX ADMIN — FOSPHENYTOIN SODIUM 104 MILLIGRAM(S) PE: 50 INJECTION INTRAMUSCULAR; INTRAVENOUS at 09:43

## 2024-12-23 RX ADMIN — HEPARIN SODIUM 5000 UNIT(S): 1000 INJECTION INTRAVENOUS; SUBCUTANEOUS at 21:55

## 2024-12-23 NOTE — PROGRESS NOTE ADULT - ASSESSMENT
45 yo M with prolonged hospital stay following acute parenchymal pontine hemorrhage, R cerebellar infarct with poor neurologic recovery s/p trach/PEG s/p multiple antibiotic courses for presumed pneumonia (including Acinetobacter), Klebsiella aerogenes bacteremia (source not identified) with new fever on 12/22, no leukocytosis.  He apparently is having new focal seizures - could be the cause.  However, in view of recent aspiration event on 12/15 treated with antibiotics for 3 d and LLL, partial RLL atelectasis on CTA, would evaluate for pulmonary source, which could be atelectasis.  O2 requirements are stable.  Though his abd is benign, would at least check liver enzymes (last 11/24). Though he has been afebrile since start of antibiotics, would still look at urine.  Can continue antibiotics while w/u is pending.  No evidence for line source - multiple peripheral IVs without phlebitis.  Suggest:  - F/U blood cultures from 11/22 X 2  - Send CMP  - UA with reflex to culture  - CT chest (w/o IVC)  - Continue cefepime 2 g IV q12h for now  - Can d/c vancomycin for now, would resume if hemodynamically unstable.  Recommendations discussed with primary team - will follow with you - team 2.

## 2024-12-23 NOTE — PROGRESS NOTE ADULT - SUBJECTIVE AND OBJECTIVE BOX
HPI:  Unknown age male, unknown past medical history (reported stroke and MI by coworkers) presented to [facility] with AMS, Pt was working at [employer] when he was found down by coworkers. EMS called and pt brought to [facility] ED. Intubated, sedated, started on cardene for SBPs in 200s. CT head showed brain stem bleed. Transferred to NSICU for further management.  (30 Sep 2024 12:55)    S/Overnight events: GEORGE      Hospital Course:   : transferred from [facility]. A line placed. Versed dc'd. Cy Rader at bedside, states pt has family in [place], cannot confirm medications or PMH other than stroke and MI. 250cc bolus 3% given. LR switched to NS. hydralazine 25q8 started, 3% started, switched propofol to precedex   10/1: stability CTH done. Added labetalol, started TF. Palliative consulted. ethics consulted to determine surrogate. febrile 103, pan cx sent  10/2: BD 2, GEORGE overnight. TF resumed. Desatt'd to 80s, FiO2 inc. to 50. Fentanyl given, ABG, CXR ordered. Maxxed on precedex, started on propofol for DARIEN -4 - -5. Precedex dc'd. Duonebs, mucomyst, hypertonic added. 3% dc'd. Cardene dc'd. Start vanc/CTX. Increased labetalol 200q8. MRSA negative, dc'd vanc. ETT pulled back 2cm x 2, good positioning after confirmatory chest xray. Ethics attempting to establish HCP with family. Na 159, starting FW 250q6 for range 150-155.   10/3: BD3, GEORGE o/n, neuro stable. Na elevating, FW increased to 300q6. Dc'd bowel reg for diarrhea. vEEG started. SQH 5000q8 tonight.   10/4: BD 4, albumn bolus, incr. LR to 80 2/2 incr. in Cr, LR to 100cc/hr for uptrending Cr. Started 7% hypersal for 48hrs and SL atropine for inline/oral thick secretions. Dc'd CTX and started ancef for MSSA in the sputum. Nephrology consulted for CKD, f/u recs. SBP 170s, given hydralazine 10mg IVP.   10/5: BD5, o/n 10mg IVP hydralazine given for SBP 170s and started on hydralazine 25q8 via OGT. 10mg IV push labetalol for SBP > 160s. RT placed for diarrhea.   10/6: BD6, o/n FW increased to 350q4 per nephrology recs. IV tylenol for temp 100.6, SBp 160s presumed uncomfortable.   10/7: BD7, overnight pancultured for temp 101.8F.   10/8: BD8. GEORGE. Cr bumped. decreased LR to 75cc/hr. Adding simethicone ATC. incr hydralazine 50mgTID. Incr labetalol 300mgTID. Na 145, decreased FWF to 250q6. Start precedex. FENa consistent with intrinsic kidney injury. Pend repeat renal US. Retaining up to 1.3L, bladder scans q6, straight cath PRN  10/9: BD 9. GEORGE overnight. Neuro stable. abd xray for distention w non-specific gas pattern, OGT to LIWS for morning. duonebs/mucomyst to q8 for improving secretions. Changed tube feeds to Jevity 1.5 20cc/hr, low rate due to abdominal distention, nepro dense and more difficult to digest. Tolerating CPAP, confirmed by ABG.   10/10: BD 10. GEORGE overnight. Neuro stable. (+) gabriel for urinary retention on bladder scan. inc TF to goal rate of 40cc/hr. family leaning toward pursuing trach/PEG. 1/2 amp for FS 81.   10/11: BD 11. GEORGE overnight. Neuro stable. Trach/PEG consults placed.   10/12: BD 12. GEORGE overnight. Neuro stable. MRI brain complete.   10/13: BD 13. Increase flomax. Hold SQH after PM dose for trach tm. IVL.   10/14: BD 14. GEORGE overnight, remains on AC/VC. Gabriel placed for urinary retention. Dc'd free water.  S/p trach with pulm. NGT placed and CXR confirmed in good position.   10/15: BD 15, GEORGE ovn. resumed feeds. spiked 101, pan cx sent.   10/16: BD 16. GEORGE ovn. Lokelma 5mg for K+ 5.4. Started vanc q 24/zosyn for empiric PNA coverage, IVF to 100/hr. PEG held for fever.   10/17: BD 17,  ordered serum osm and urine osm for am. Started sinemet for neurostimulation. Increased cardura to 0.8. Started FW 100q4, dc'd IVF. MRSA negative, dc'd vanc. NGT replaced d/t coiling.   10/18: BD 18, GEORGE overnight, neuro stable. Amantadine added for neurostim. zosyn changed to unasyn for acinetobacter baumannii, failed TOV and required SC  10/19: BD 19, GEORGE ovn. cardura 2mg added for retention. labetalol decreased 200q8, hydralazine decreased 25q8. Gabriel replaced.   10/20: BD20, GEORGE overnight. NGT dislodged, replaced. PEG tomorrow w/ gen surg, FW increased to 150q4 and labetalol decreased to 100q8, lokelma given for hyperkalemia.   10/21: BD 21. POD0 PEG placement with Gen surg. decr labetolol to 50q8, incr. cardura to 0.4, started lokelma and phoslo, dc gabriel POD0 PEG placement with Gen surg.  10/22: BD 22. Plan to start TF today via PEG. dc labetalol, Following ophtho recs. Increased apnea settings - found to be in cheyne-moe respiration. CPAP 5/5.  10/23: BD 23. hydralazine d/c'd, trach collar trial today. Rectal tube placed at 6am.  10/24: BD24, o/n lokelma held due to diarrhea. Free water 100q6 resumed. dc'd tamsulosin, amantadine. Incr'd cardura to 8mg qhs. Dc'd FW. Switched jevity to nepro. gabriel placed for high urine output. Started SL atropine for oral secretions. Dc'd free water.  10/25: BD25, o/n decreased suctioning requirements to > q4hrs, GEORGE. Cr improving, cont phoslo, lokelma held at this time. Gabriel placed yest, cont. Tolerating trach collar. Given 500cc plasmalyte bolus for ANIKA. Dc'd sinemet.   10/26: BD26, o/n resumed lokelma 5mg daily and resumed 100cc free water q6hrs. Change in neuro status with new right pupillary dilation with anisocoria (right pupil 6mm fixed and left pupil 3mm briskly reactive). Given 23.4% NaCl bullet, taken for emergent CTH showing mostly resolved pontine hemorrhage, continued brainstem hypodensity likely edema d/t hemorrhage, no new hemorrhage or infarct, no herniation, mild increase in size of left lateral ventricle. Vitals remaine stable. Na goal > 140.   10/27: BD27, o/n GEORGE.Neuro stable. Pend stepdown with airway bed.   10/28: BD 28. GEORGE overnight. Neuro stable. Miralax ordered. Gabriel removed, pending TOV.  10/29: BD 29. GEORGE o/n. Given 2L NS over 8 hrs for increased BUN/Cr ratio. Gabriel placed for frequent straight cath.   10/30: BD 30.   10/31: BD 31. GEORGE overnight. Na 149, increased free water to 200q6. 1L NS for uptrending BUN.   : BD 32. GEORGE overnight. Given 1L NS for dehydration. Na 146, increased FW to 250q6.   : BD 33 GEORGE overnight, neuro stable, given 500cc bolus for net negative status and tachycardia   11/3: BD 34, GEORGE overnight, neuro stable. Patient remains tachycardic, EKG showing sinus tachycardia, given additional 500cc NS bolus. Febrile to 101.9F, pan cultured (without UA), CXR WNL, given tylenol.   : BD 35, GEORGE overnight, neuro stable. Given 1L NS for tachycardia. sputum (+) for stenotropohomas maltophilia.   : BD 36 GEORGE overnight, neuro stable. Vancomycin dc'd. Chest PT BID. ID consulted, cont zosyn.  : BD 37. blood cx + klebsiella dc zosyn changed to cefepime, CTAP ordered, rpt blood cx sent.    : BD 38. Pending CT A/P, given 250cc bolus and starting maintenance fluids overnight. Pending CT A/P after bolus   : BD 39. CT CAP negative for infection.   : BD 40. GEORGE overnight.  11/10: BD 41. GEORGE overnight. desat to 85 on trach collar, O2 inc to 10L and 100%, O2 sat inc to 95. pt tachy to 110s, euvolemic. given tylenol. ABG and CXR ordered. spiked fever, pancultured, RVP negative. AM ABG w pO2 79, rpt w pO2 79. pt appears comfortable, satting 94%.   : BD 42. GEORGE overnight. pt became tachy to 130s, desat to 90 on 100% FiO2 and 10L. suctioned, (+) productive cough. temp 101.4, given 1g IV tylenol and 500cc NS bolus for euvolemia. fever and HR downtrending. LE dopplers negative for dvt  : BD 43, GEORGE ovn, fever and HR downtrending, satting 97% 70% FIO2  : BD 44, GEORGE ovn. started standing tylenol x24 hours for tachycardia. desat to 80s, o2 increased. CXR stable, pending CTA PE protocol.   : BD 45, GEORGE overnight, neuro stable. resp therapy dec FiO2 to 70%.   11/15: BD 46, GEORGE overnight, neuro stable.  Rapid called for desaturation 30s, tachycardic 140s. Patient bagged, 100% fio2, heavily suctioned. CXR/POCUS unremarkable. ABG c/w desaturation. WBC 14.71. Afebrile. O2 improved to 90s and patient upgraded to ICU. ABG paO2 30s improved to 89 on vent. IV Tylenol x 1, sputum sent. Start protonix while o-n vent.   : BD 47. POCUS showed collapsable IVCF, given 1L bolus. Vanco/Cefpime added empriic for PNA, NGT feeds restarted. MRSA swab neg, Vanc DC'd.   : BD 48. GEORGE overnight. 1l bolus for tachycardia. Spiked to 101, cultured. 500cc bolus for tachycardia, tachy to 148 given 25mcg fentanyl, 250cc albumin, 1.5L bolus. 5 IV lopressor with response HR to 100s. +Stenotrophomonas on sputum cx.   : BD 49. GEORGE overnight. Consulted ID, cefepime switched to bactrim x7days. Started hydrochlorothiazine 12.5mg daily.  : BD 50. Tachy 120s, given tylenol and 500cc NS. Tolerating 5/5, switched to TCx. Holding phos binder. D/c Bactrim. D/c gabriel, f/u TOV. Dc'd PPI.   : BD 51. GEORGE ovn. 1600 satting low 90s, mildly tachy to 110s, afebrile, RR wnl. O2 improved to mid 90s while inc O2 to 100% on TCx. CPAP 5/5 placed back on.  : BD 52, GEORGE ovn, tolerating CPAP 5/5. Switch to trach collar during the day if tolerating well. HCTZ held for Cr bump, straight cath frequence increased to q4  : BD 53, GEORGE ovn. Resumed phoslo. Gabriel placed. Resumed HCTZ.   : BD 54. Holding tylenol in setting of possible fever, will require pan cx if febrile. Cr improved today. Cont CPAP. Bowel regimen held i/s/o diarrhea. FOBT negative.  : BD 55. GEORGE overnight. Neuro stable. HCTZ dc'ed, started lisinopril 5. Lokelma dc'ed for K 3.7.   : BD 56, GEORGE overnight. Neuro stable. dc'd lisinopril 5mg. Gabriel dc'd. TOV. 1545 noted to be hypotensive, MAP 50, in supine position on chair, HR 60s, afebrile, O2 96%. Given 1L cc NS bolus, placed back on bed in reverse trendelenberg, improved to map of 66. Neostick at bedside. Vitals check q1h. Dc'ed amlodipine. Failed TOV, bladder scan q6, sc prn. Added back Senna.   : BD 57, GEORGE overnight. Neuro stable. Dc'd phoslo.   : BD 58, GEORGE overnight. Neuro stable.    : BD 59. Gabriel replaced.   : GEORGE.  : GEORGE, neuro stable.   : BD 62, GEORGE overnight  : BD 63, GEORGE overnight.; Given 1L bolus of LR for uptrending BUN/Cr.  12/3: BD 64. Reinstated eye gtt/moisture chamber given increased Rt eye injection  : BD 65. GEORGE overnight. Attempted to speak with ophtho regarding eyelid weight/closure but no answer, full mailbox.   : BD 66, GEORGE overnight, bowel regimen increased and had BM.   : BD 67, GEORGE overnight, neuro stable.  : GEORGE overnight, neuro stable. /110s, given x1 hydralazine 10 mg IVP. Restarted home amlodipine 5mg.  : GEORGE. OOB to chair.     : GEORGE, mucomyst added for thick secretions, simethicone for abd distension, abd xray with stool burden, increased bowel regimen.   : GEORGE overnight.   : GEORGE overnight.   : GEORGE overnight  12/15: o/n Patient became tachycardic HR 120s and 10 minutes later O2 sat dropped as low as 89%. Patient suctioned without improvement in O2 sat and tube feeds found in suction catheter. TFs held.  STAT CXR ordered. STAT labs sent. Respiratory therapist called to bedside and patient trach connected to ventilator. After connection to ventilator and further suctioning O2 sat improved to 97% but patient HR remains 120-130s. Upgraded to NSICU for further management. Vancomycin and zosyn started. CTA  chest PE protocol and CTH ordered. Blood cultures sent.given 500cc bolus, rpt ABG sent pO2 243, CTH and CTA chest done. FS while NPO, FiO2 dec 50 pending ABG. sputum cx positive for few GPC and GNR.   : GEORGE overnight, restarted amlodipine, troponin 75   : GEORGE overnight, neuro stable. Attempted CPAP this morning, did not tolerate and back on full vent support. Dc'd Vanc. Tachycardia to 140s, noted extremities to be twitching along with jaw twitching. Given 2g of Keppra, total 4mg ativan and placed on EEG, full set of labs and lactate negative. Resumed trickle feeds at 20cc/hr. Given 250cc albumin for tachycardia.   : GEORGE overnight. Neuro stable. CTH stable. EEG negative and dc'd.   : GEORGE overnight. neuro stable. SIMV most of day, AC/VC at night.   : GEORGE overnight, neuro stable. remains on VC/AC  : GEORGE ovn. 1L bolus for tachy to 120s-130s.   : GEORGE ovn. Tachy to 120s, given tylenol and IVF. 2mg IV ativan given for L jaw twitching. Sx resolved for 3 minutes and started again. Epilepsy contacted. Given 2mg IV ativan. Continued twitching. Increased keppra to 1500mg BID, 2mg ativan given. Propranolol started for refractory tachycardia. vEEG ordered. albumin given. POCUS performed, no b lines. Started on fosphenytoin, loaded w 20mg/kg then 100mg TID. febrile, pancx, started cefepime 2g q8, vancomycin  : GEORGE ovn.       Vital Signs Last 24 Hrs  T(C): 37.4 (22 Dec 2024 23:57), Max: 38.5 (22 Dec 2024 22:05)  T(F): 99.4 (22 Dec 2024 23:57), Max: 101.3 (22 Dec 2024 22:05)  HR: 84 (23 Dec 2024 00:06) (84 - 128)  BP: 101/59 (23 Dec 2024 00:06) (100/62 - 132/96)  BP(mean): 75 (23 Dec 2024 00:06) (75 - 108)  RR: 18 (23 Dec 2024 00:06) (14 - 20)  SpO2: 100% (23 Dec 2024 00:06) (95% - 100%)    Parameters below as of 23 Dec 2024 00:06  Patient On (Oxygen Delivery Method): ventilator    O2 Concentration (%): 40    I&O's Detail    21 Dec 2024 07:01  -  22 Dec 2024 07:00  --------------------------------------------------------  IN:    Enteral Tube Flush: 1000 mL    Miscellaneous Tube Feedin mL  Total IN: 2140 mL    OUT:    Indwelling Catheter - Urethral (mL): 1715 mL  Total OUT: 1715 mL    Total NET: 425 mL      22 Dec 2024 07:01  -  23 Dec 2024 00:42  --------------------------------------------------------  IN:    Enteral Tube Flush: 500 mL    IV PiggyBack: 350 mL    Miscellaneous Tube Feedin mL  Total IN: 1810 mL    OUT:    Indwelling Catheter - Urethral (mL): 1100 mL  Total OUT: 1100 mL    Total NET: 710 mL        I&O's Summary    21 Dec 2024 07:01  -  22 Dec 2024 07:00  --------------------------------------------------------  IN: 2140 mL / OUT: 1715 mL / NET: 425 mL    22 Dec 2024 07:01  -  23 Dec 2024 00:42  --------------------------------------------------------  IN: 1810 mL / OUT: 1100 mL / NET: 710 mL        PHYSICAL EXAM:  GEN: laying in bed, NAD  NEURO: Unable to assess AO status 2/2 mental status. not FC, OE to voice, face symmetric. PERRL, vertical EOMs only. Wd to noxious stimuli x4.  CV: RRR +S1/S2  PULM: CTAB, +trach  GI: Abd soft, NT/ND  EXT: ext warm, dry, nontender      DEVICE/DRAIN DRESSING:    TUBES/LINES:  [] CVC  [] A-line  [] Lumbar Drain  [] Ventriculostomy  [] Other    DIET:  [] NPO  [] Mechanical  [x] Tube feeds    LABS:    Urinalysis Basic - ( 21 Dec 2024 07:53 )    Color: x / Appearance: x / SG: x / pH: x  Gluc: 111 mg/dL / Ketone: x  / Bili: x / Urobili: x   Blood: x / Protein: x / Nitrite: x   Leuk Esterase: x / RBC: x / WBC x   Sq Epi: x / Non Sq Epi: x / Bacteria: x          CAPILLARY BLOOD GLUCOSE      POCT Blood Glucose.: 122 mg/dL (22 Dec 2024 11:50)      Drug Levels: [] N/A  Vancomycin Level, Trough: 37.3 ug/mL (12-16 @ 16:23)    CSF Analysis: [] N/A      Allergies    Allergy Status Unknown    Intolerances      MEDICATIONS:  Antibiotics:  cefepime   IVPB      cefepime   IVPB 2000 milliGRAM(s) IV Intermittent every 8 hours  cefepime   IVPB 2000 milliGRAM(s) IV Intermittent every 8 hours  vancomycin  IVPB 1250 milliGRAM(s) IV Intermittent every 12 hours  vancomycin  IVPB        Neuro:  acetaminophen   Oral Liquid .. 650 milliGRAM(s) Oral every 6 hours PRN  fosphenytoin IVPB 100 milliGRAM(s) PE IV Intermittent three times a day  levETIRAcetam 1500 milliGRAM(s) Oral two times a day  oxyCODONE    Solution 5 milliGRAM(s) Oral every 4 hours PRN    Anticoagulation:  heparin   Injectable 5000 Unit(s) SubCutaneous every 8 hours    OTHER:  acetylcysteine 10%  Inhalation 4 milliLiter(s) Inhalation every 6 hours  albuterol/ipratropium for Nebulization 3 milliLiter(s) Nebulizer every 6 hours  amLODIPine   Tablet 5 milliGRAM(s) Oral daily  artificial tears (preservative free) Ophthalmic Solution 1 Drop(s) Right EYE every 4 hours  chlorhexidine 0.12% Liquid 15 milliLiter(s) Oral Mucosa every 12 hours  chlorhexidine 2% Cloths 1 Application(s) Topical <User Schedule>  doxazosin 8 milliGRAM(s) Oral at bedtime  erythromycin   Ointment 1 Application(s) Right EYE at bedtime  fluticasone propionate 50 MICROgram(s)/spray Nasal Spray 1 Spray(s) Both Nostrils two times a day  ofloxacin 0.3% Solution 1 Drop(s) Right EYE four times a day  pantoprazole  Injectable 40 milliGRAM(s) IV Push daily  petrolatum Ophthalmic Ointment 1 Application(s) Both EYES two times a day  propranolol 10 milliGRAM(s) Oral every 8 hours  sodium chloride 0.65% Nasal 1 Spray(s) Both Nostrils two times a day    IVF:    CULTURES:  Culture Results:   Moderate Stenotrophomonas maltophilia  Commensal iram consistent with body site (12-15 @ 05:06)  Culture Results:   No growth at 5 days (12-15 @ 01:03)    RADIOLOGY & ADDITIONAL TESTS:      ASSESSMENT:  43 y/o PMH ?stroke/MI present to [facility] after collapsing at work. Decorticate posturing, vomiting, intubated for airway protection. 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 Transferred to [facility] for further management. s/p trach 10/14. s/p peg 10/21. Re-upgrade to ICU 2/2 desaturation event and suctioning requirements 11/15. Re-upgrade to NSICU 12/15 2/2 desaturation and tachycardia.    PLAN:  Neuro:  - neuro/vitals q4h  - pain control: tylenol prn  - seizure tx: keppra 1500mg BID, fosphenytoin 100mg TID  - vEEG (10/3-) negative, (10/17-10/19) negative, (-) negative, (- )  - CTH : enlarged pontine hemorrhage, CTH 10/3: stable, CTH 10/25: mostly resolved pontine hemorrhage, CTH 12/15: R mastoid air cell opacification; acute otitis media vs sterile effusion, CTH : stable.  - MRI brain 10/12: parenchymal hemorrhage, acute/subacute R cerebellar stroke    - stroke core measures, stroke neuro signed off    CV:  - -160  - tachycardia: propranolol 10mg q8  - HTN: amlodipine 5mg, hold lisinopril 5mg   - echo () EF 75%, repeat : 57%    Resp:  - trach to vent, AC/VC 40/400/14/6  - CTA PE protocol 12/15 negative   - Secretions: duonebs/mucomyst/chest PT q6h     GI:  - TF via PEG (placed 10/21 by gen surg)  - bowel regimen held for loose stool, last BM     Renal:  - IVL  - Urinary retenion: Gabriel replaced 12/15  - CKD: trend BUN/Cr  - renal US 10/1: echogenicity c/w chronic med renal dz, repeat 10/8: inc renal echogeicity, c/f medical renal disease w increased hydro     Endo:  - A1c 5.4    Heme:  - DVT ppx: SCDs, SQH 5000u q8h   - LE dopplers negative     ID:  - empiric PNA: zosyn (12/15-), s/p vanc (12/15-), cefepime/vanc (- )  - blood and sputum cultures 12/15  - last pancx , MRSA swab neg    - s/p Stenotrophomonas maltophilia PNA: s/p Bactrim (-) s/p Cefepime (-)  - 11/3, (+) sputum for stenotrophomas maltophlia, blood cx (+) klebsiella, cefepime 2gq12 ( - )   - empiric tx: s/p zosyn (11/3-) , s/p vanc  (11/3-)  - S/p Ancef (10/4-10/14) for PNA, and s/p Unasyn (10/18-10/23) +actinobacter baumanii     MISC:  - ophtho consult for keratitis  - erythromycin ointment R eye q4hrs, ofloxacin ointment R eye QID, artificial tears R eye q2hrs, moisture chamber at bedtime    Dispo: SDU status, full code, pending placement and guardianship hearing     D/w Dr. D'Amico

## 2024-12-23 NOTE — PROGRESS NOTE ADULT - SUBJECTIVE AND OBJECTIVE BOX
INTERVAL HPI/OVERNIGHT EVENTS:  Recalled to see patient for fever.  45 yo M admitted 9/30 with acute parenchymal hemorrhage within nabil with mass effect, acute/subacute R cerebellar infarct in setting of hypertensive emergency with poor neurologic recovery s/p trach (10/14) & PEG (10/21), urinary retention s/p Vuong, ANIKA on CKD, HAP, multiple courses of antibiotics, mainly empirically for pneumonia.  Had Klebsiella aerogenes bacteremia 11/3, source unclear, CT A/P without evidence for GI/ source.  He was treated with pip-tazo then cefepime.  He had largely been afebrile from 11/24 until 12/14, when he had T 100.1 (ax).  ON 12/15, he had episode of tachycardia and desaturated, tube feeds were found in suction catheter.  CTA chest showed near-complete to complete LLL atelectasis, partial RLL atelectasis, a few clustered and patchy L lung opacities that were indeterminate, possibly infectious vs. inflammatory.  ET culture grew Stenotrophomonas maltophilia, which he had grown previously repeatedly and was considered commensal, blood culture X 1 NG.  He was treated with pip-tazo from 12/15-12/17.  He was afebrile from 12/16 through 12/21, when he had Tm 100.1 (ax).  He then became febrile on 12/22 with Tm 101.3 (ax).  He was started on vanc and cefepime. ID consult recalled. Of note, he was noted to have new onset of focal status epilepticus (12/17-12/18 and 12/22).      ROS:  Unobtainable - nonverbal, no meaningful interactions.      ANTIBIOTICS/RELEVANT:    Vancomycin 1250 mg IV q12h (10/16, 11/3-11/5, 12/22-present)  Cefepime 2 g IV q8h (11/6-11/12; 11/16-11/18;  12/22-present)    Amp-sulbactam 10/18-10/23  Cefazolin 10/4-10/14  Ceftriaxone 10/2-10/4  Pip-tazo 10/16-10/18, 11/3-11/6, 12/15-12/17  TMP/SX 11/18-11/19        Vital Signs Last 24 Hrs  T(C): 36.9 (23 Dec 2024 21:08), Max: 37.4 (22 Dec 2024 23:57)  T(F): 98.4 (23 Dec 2024 21:08), Max: 99.4 (22 Dec 2024 23:57)  HR: 76 (23 Dec 2024 21:04) (74 - 84)  BP: 104/68 (23 Dec 2024 20:06) (100/68 - 126/92)  BP(mean): 82 (23 Dec 2024 20:06) (75 - 105)  RR: 15 (23 Dec 2024 21:04) (14 - 19)  SpO2: 100% (23 Dec 2024 21:04) (98% - 100%)    Parameters below as of 23 Dec 2024 21:04  Patient On (Oxygen Delivery Method): ventilator    O2 Concentration (%): 40    PHYSICAL EXAM:  Constitutional:  Awake, startle on L, not following commands  HEENT:  EEG leads in place, sclerae anicteric, conjunctivae clear, PERRL.  No nasal exudate or sinus tenderness;  Unable to visualize pharynx  Neck:  Supple, +trach - site clean  Respiratory:  Clear bilaterally anteriorly  Cardiovascular:  RRR, S1S2, no murmur appreciated  Gastrointestinal:  PEG site clean, normoactive BS, soft, NT, no masses, guarding or rebound.  No HSM  :  Vuong catheter   Extremities:  No edema      LABS:                        9.6    6.78  )-----------( 192      ( 23 Dec 2024 05:30 )             29.7         12-23    134[L]  |  101  |  33[H]  ----------------------------<  100[H]  3.7   |  25  |  1.25    Ca    8.7      23 Dec 2024 05:30  Phos  3.1     12-23  Mg     2.0     12-23        Urinalysis Basic - ( 23 Dec 2024 05:30 )    Color: x / Appearance: x / SG: x / pH: x  Gluc: 100 mg/dL / Ketone: x  / Bili: x / Urobili: x   Blood: x / Protein: x / Nitrite: x   Leuk Esterase: x / RBC: x / WBC x   Sq Epi: x / Non Sq Epi: x / Bacteria: x        MICROBIOLOGY:        RADIOLOGY & ADDITIONAL STUDIES:    < from: Xray Chest 1 View-PORTABLE IMMEDIATE (Xray Chest 1 View-PORTABLE IMMEDIATE .) (12.21.24 @ 07:41) >  Findings/  impression:  Status post tracheostomy. Interval improvement in left   basilar patchy opacities. The cardiomediastinal silhouette is unchanged.    < end of copied text >

## 2024-12-23 NOTE — PROGRESS NOTE ADULT - SUBJECTIVE AND OBJECTIVE BOX
Inteval history:    No events overnight. No complaints currently.    ROS  As above, otherwise negative for constitutional/HEENT/CV/pulm/GI//MSK/neuro/derm/endocrine/psych.     MEDICATIONS  (STANDING):  acetylcysteine 10%  Inhalation 4 milliLiter(s) Inhalation every 6 hours  albuterol/ipratropium for Nebulization 3 milliLiter(s) Nebulizer every 6 hours  amLODIPine   Tablet 5 milliGRAM(s) Oral daily  artificial tears (preservative free) Ophthalmic Solution 1 Drop(s) Right EYE every 4 hours  cefepime   IVPB 2000 milliGRAM(s) IV Intermittent every 8 hours  chlorhexidine 0.12% Liquid 15 milliLiter(s) Oral Mucosa every 12 hours  chlorhexidine 2% Cloths 1 Application(s) Topical <User Schedule>  doxazosin 8 milliGRAM(s) Oral at bedtime  erythromycin   Ointment 1 Application(s) Right EYE at bedtime  fluticasone propionate 50 MICROgram(s)/spray Nasal Spray 1 Spray(s) Both Nostrils two times a day  fosphenytoin IVPB 100 milliGRAM(s) PE IV Intermittent three times a day  heparin   Injectable 5000 Unit(s) SubCutaneous every 8 hours  levETIRAcetam 1500 milliGRAM(s) Oral two times a day  ofloxacin 0.3% Solution 1 Drop(s) Right EYE four times a day  pantoprazole  Injectable 40 milliGRAM(s) IV Push daily  petrolatum Ophthalmic Ointment 1 Application(s) Both EYES two times a day  propranolol 10 milliGRAM(s) Oral every 8 hours  sodium chloride 0.65% Nasal 1 Spray(s) Both Nostrils two times a day  vancomycin  IVPB 1250 milliGRAM(s) IV Intermittent every 12 hours  vancomycin  IVPB        MEDICATIONS  (PRN):  acetaminophen   Oral Liquid .. 650 milliGRAM(s) Oral every 6 hours PRN Temp greater or equal to 38.5C (101.3F), Mild Pain (1 - 3)  oxyCODONE    Solution 5 milliGRAM(s) Oral every 4 hours PRN Moderate Pain (4 - 6)      T(C): 36.6 (12-23-24 @ 09:00), Max: 38.5 (12-22-24 @ 22:05)  HR: 74 (12-23-24 @ 08:35) (74 - 128)  BP: 126/92 (12-23-24 @ 08:26) (100/62 - 126/92)  RR: 17 (12-23-24 @ 08:35) (14 - 20)  SpO2: 98% (12-23-24 @ 08:35) (97% - 100%)  Wt(kg): --    General:  Constitutional:  Sitting comfortably in NAD.  Ears, Nose, Throat: no abnormalities, mucus membranes moist  Neck: supple, no lymphadenopathy  Extremities: no edema, clubbing or cyanosis  Skin: no rash or neurocutaneous signs     Cognitive:  Orientation, language, memory and knowledge screens intact.    Cranial Nerves:  II: THAI. III/IV/VI: EOM Full.  Absent nystagmus  V1V2V3: Symmetric, VII: Face appears symmetric VIII: Normal to screening, IX/X: Palate Elevates Symmetrical  XI: Trapezius Symmetric  XII: Tongue midline  Motor:  Power: no pronator drift, power 5/5 distal U/E  Tone: normal x 4 limbs  No tremor  Coordination/Gait:  Finger-nose intact, normal finger taps and rapid-alternating movements,   Narrow based gait, tandem forward   hops well on both feet  Reflexes:  DTR: 2+ symmetric all 4 limbs, no clonus      Investigations:  CBC Full  -  ( 23 Dec 2024 05:30 )  WBC Count : 6.78 K/uL  RBC Count : 3.13 M/uL  Hemoglobin : 9.6 g/dL  Hematocrit : 29.7 %  Platelet Count - Automated : 192 K/uL  Mean Cell Volume : 94.9 fl  Mean Cell Hemoglobin : 30.7 pg  Mean Cell Hemoglobin Concentration : 32.3 g/dL  Auto Neutrophil # : 4.09 K/uL  Auto Lymphocyte # : 1.59 K/uL  Auto Monocyte # : 0.61 K/uL  Auto Eosinophil # : 0.32 K/uL  Auto Basophil # : 0.07 K/uL  Auto Neutrophil % : 60.3 %  Auto Lymphocyte % : 23.5 %  Auto Monocyte % : 9.0 %  Auto Eosinophil % : 4.7 %  Auto Basophil % : 1.0 %    12-23    134[L]  |  101  |  33[H]  ----------------------------<  100[H]  3.7   |  25  |  1.25    Ca    8.7      23 Dec 2024 05:30  Phos  3.1     12-23  Mg     2.0     12-23              EEG:

## 2024-12-23 NOTE — EEG REPORT - NS EEG TEXT BOX
[facility] Department of Neurology  Inpatient Continuous video-Electroencephalogram  [address], [address] [place] [address], T: [phone]    Patient Name:	GARETT DELEON    :	1980  MRN:	[id number]    Study Start Date/Time:  2024, 2:03:49 PM  Study End Date/Time: in progress    Referred by:  Randy D'Amico, MD    Brief Clinical History:  GARETT DELEON is a 44 year old Male; study performed to investigate for seizures or markers of epilepsy.     Diagnosis Code:  R56.9 convulsions/seizure    Pertinent Medication:  Keppra 1500 mg BID  Fosphenytoin 100 mg TID    Acquisition Details:  Electroencephalography was acquired using a minimum of 21 channels on an Power[employer] Neurology system v 9.3.1 with electrode placement according to the standard International 10-20 system following ACNS (American Clinical Neurophysiology Society) guidelines.  Anterior temporal T1 and T2 electrodes were utilized whenever possible.  The XLTEK automated spike & seizure detections were all reviewed in detail, in addition to the entire raw EEG.    Findings:    Day 1:  2024, 2:03:49 PM to next morning at 07:00 AM   Background:  continuous, with predominantly alpha and beta frequencies.  Generalized Slowing:  None  Symmetry/Focality: Continuous (90+%) right frontal theta/delta frequency slowing, at times sharply contoured but not definitively epileptiform.    Voltage:  Normal (20+ uV)  Organization:  Appropriate anterior-posterior gradient  Posterior Dominant Rhythm:  No clear PDR obscured by excess beta  Sleep:  Symmetric, synchronous spindles and K complexes.  Variability:   Yes		Reactivity:  Yes    Spontaneous Activity:  No epileptiform discharges     Events:  Frequent episodes of irregular,  non-rhythmic jerking of the abdomen and torso.  These did not have an epileptiform electrographic correlate and did not have the rhythmic appearance or periodic movement artifact associated with focal motor seizures.  Provocations:  •	Hyperventilation: was not performed.  •	Photic stimulation: was not performed.    Daily Summary:    •	Frequent episodes of irregular,  non-rhythmic jerking of the abdomen and torso, not clinically or electrographically consistent with seizure.    •	Right frontal focal slowing, at times sharply contoured, indicating focal cerebral dysfunction and increased cortical irritability in this region.        Kandis Ball MD  Attending Neurologist, [facility] Epilepsy Program     [facility] Department of Neurology  Inpatient Continuous video-Electroencephalogram  [address], [address] [place] [address], T: [phone]    Patient Name:	GARETT DELEON    :	1980  MRN:	[id number]    Study Start Date/Time:  2024, 2:03:49 PM  Study End Date/Time: in progress    Referred by:  Randy D'Amico, MD    Brief Clinical History:  GARETT DELEON is a 44 year old Male; study performed to investigate for seizures or markers of epilepsy.     Diagnosis Code:  R56.9 convulsions/seizure    Pertinent Medication:  Keppra 1500 mg BID  Fosphenytoin 100 mg TID    Acquisition Details:  Electroencephalography was acquired using a minimum of 21 channels on an [employer] Neurology system v 9.3.1 with electrode placement according to the standard International 10-20 system following ACNS (American Clinical Neurophysiology Society) guidelines.  Anterior temporal T1 and T2 electrodes were utilized whenever possible.  The XLTEK automated spike & seizure detections were all reviewed in detail, in addition to the entire raw EEG.    Findings:    Day 1:  2024, 2:03:49 PM to next morning at 07:00 AM   Background:  continuous, with predominantly alpha and beta frequencies.  Generalized Slowing:  None  Symmetry/Focality: Continuous (90+%) right frontal theta/delta frequency slowing, at times sharply contoured but not definitively epileptiform.    Voltage:  Normal (20+ uV)  Organization:  Appropriate anterior-posterior gradient  Posterior Dominant Rhythm:  No clear PDR obscured by excess beta  Sleep:  Symmetric, synchronous spindles and K complexes.  Variability:   Yes		Reactivity:  Yes    Spontaneous Activity:  No epileptiform discharges     Events:    1)	Frequent rhythmic left facial twitching, consistent with focal motor seizures.  2)	Frequent episodes of irregular,  non-rhythmic jerking of the abdomen and torso.  These did not have an epileptiform electrographic correlate and did not have the rhythmic appearance or periodic movement artifact associated with focal motor seizures.  Provocations:  •	Hyperventilation: was not performed.  •	Photic stimulation: was not performed.    Daily Summary:    •	Frequent rhythmic left facial twitching, consistent with focal motor seizures.  •	Frequent episodes of irregular,  non-rhythmic jerking of the abdomen and torso, not clinically or electrographically consistent with seizure.    •	Right frontal focal slowing, at times sharply contoured, indicating focal cerebral dysfunction and increased cortical irritability in this region.        Kandis Ball MD  Attending Neurologist, [facility] Epilepsy Program   [facility] Department of Neurology  Inpatient Continuous video-Electroencephalogram  [address], [address] [place] [address], T: [phone]    Patient Name:	GARETT DELEON    :	1980  MRN:	[id number]    Study Start Date/Time:  2024, 2:03:49 PM  Study End Date/Time: in progress    Referred by:  Randy D'Amico, MD    Brief Clinical History:  GARETT DELEON is a 44 year old Male; study performed to investigate for seizures or markers of epilepsy.     Diagnosis Code:  R56.9 convulsions/seizure    Pertinent Medication:  Keppra 1500 mg BID  Fosphenytoin 100 mg TID    Acquisition Details:  Electroencephalography was acquired using a minimum of 21 channels on an [employer] Neu[employer] system v 9.3.1 with electrode placement according to the standard International 10-20 system following ACNS (American Clinical Neurophysiology Society) guidelines.  Anterior temporal T1 and T2 electrodes were utilized whenever possible.  The XLTEK automated spike & seizure detections were all reviewed in detail, in addition to the entire raw EEG.    Findings:    Day 1:  2024, 2:03:49 PM to next morning at 07:00 AM   Background:  continuous, with predominantly low voltage delta activity with overriding beta activity.  Symmetry/Focality: Continuous (90+%) right frontal theta/delta frequency slowing, at times sharply contoured but not definitively epileptiform.    Voltage:  Low (most <20uV LBP Peak-Trough)  Organization:  Appropriate anterior-posterior gradient  Posterior Dominant Rhythm:  No clear PDR obscured by excess beta  Sleep:  Symmetric, synchronous spindles and K complexes.  Variability:   Yes		Reactivity:  Yes    Spontaneous Activity:  No epileptiform discharges     Events:    1)	Frequent rhythmic left facial twitching, consistent with focal motor seizures.  2)	Frequent episodes of irregular,  non-rhythmic jerking of the abdomen and torso.  These did not have an epileptiform electrographic correlate and did not have the rhythmic appearance or periodic movement artifact associated with focal motor seizures.  Provocations:  •	Hyperventilation: was not performed.  •	Photic stimulation: was not performed.    Daily Summary:    •	Frequent rhythmic left facial twitching, consistent with focal motor seizures.  •	Frequent episodes of irregular,  non-rhythmic jerking of the abdomen and torso, not clinically or electrographically consistent with seizure.    •	Right frontal focal slowing, at times sharply contoured, indicating focal cerebral dysfunction and increased cortical irritability in this region.  •	Diffuse slowing and attenuation, consistent with diffuse or multifocal cerebral dysfunction.        Kandis Ball MD  Attending Neurologist, [facility] Epilepsy [facility]

## 2024-12-23 NOTE — PROGRESS NOTE ADULT - SUBJECTIVE AND OBJECTIVE BOX
EPILEPSY PROGRESS NOTE:  No events overnight. No complaints currently.    REVIEW OF SYSTEMS:  As above, otherwise negative for constitutional/HEENT/CV/pulm/GI//MSK/neuro/derm/endocrine/psych.    MEDICATIONS:   MEDICATIONS  (STANDING):  acetylcysteine 10%  Inhalation 4 milliLiter(s) Inhalation every 6 hours  albuterol/ipratropium for Nebulization 3 milliLiter(s) Nebulizer every 6 hours  amLODIPine   Tablet 5 milliGRAM(s) Oral daily  artificial tears (preservative free) Ophthalmic Solution 1 Drop(s) Right EYE every 4 hours  cefepime   IVPB 2000 milliGRAM(s) IV Intermittent every 8 hours  chlorhexidine 0.12% Liquid 15 milliLiter(s) Oral Mucosa every 12 hours  chlorhexidine 2% Cloths 1 Application(s) Topical <User Schedule>  doxazosin 8 milliGRAM(s) Oral at bedtime  erythromycin   Ointment 1 Application(s) Right EYE at bedtime  fluticasone propionate 50 MICROgram(s)/spray Nasal Spray 1 Spray(s) Both Nostrils two times a day  fosphenytoin IVPB 100 milliGRAM(s) PE IV Intermittent three times a day  heparin   Injectable 5000 Unit(s) SubCutaneous every 8 hours  levETIRAcetam 1500 milliGRAM(s) Oral two times a day  ofloxacin 0.3% Solution 1 Drop(s) Right EYE four times a day  pantoprazole  Injectable 40 milliGRAM(s) IV Push daily  petrolatum Ophthalmic Ointment 1 Application(s) Both EYES two times a day  propranolol 10 milliGRAM(s) Oral every 8 hours  sodium chloride 0.65% Nasal 1 Spray(s) Both Nostrils two times a day  vancomycin  IVPB 1250 milliGRAM(s) IV Intermittent every 12 hours  vancomycin  IVPB        MEDICATIONS  (PRN):  acetaminophen   Oral Liquid .. 650 milliGRAM(s) Oral every 6 hours PRN Temp greater or equal to 38.5C (101.3F), Mild Pain (1 - 3)  oxyCODONE    Solution 5 milliGRAM(s) Oral every 4 hours PRN Moderate Pain (4 - 6)      VITAL SIGNS:  T(C): 36.6 (12-23-24 @ 09:00), Max: 38.5 (12-22-24 @ 22:05)  HR: 80 (12-23-24 @ 12:05) (74 - 100)  BP: 126/92 (12-23-24 @ 08:26) (100/62 - 126/92)  RR: 14 (12-23-24 @ 12:05) (14 - 19)  SpO2: 98% (12-23-24 @ 12:05) (97% - 100%)  Wt(kg): --    PHYSICAL EXAM:  Eyes open spontaneously. Conversational with appropriate sentences.  EOMI. Visual fields full. PERRL 3>2. Face symmetrical.  Full strength throughout.  Finger-nose-finger intact R/L.  Intact to light touch throughout.    LABS:  CBC Full  -  ( 23 Dec 2024 05:30 )  WBC Count : 6.78 K/uL  RBC Count : 3.13 M/uL  Hemoglobin : 9.6 g/dL  Hematocrit : 29.7 %  Platelet Count - Automated : 192 K/uL  Mean Cell Volume : 94.9 fl  Mean Cell Hemoglobin : 30.7 pg  Mean Cell Hemoglobin Concentration : 32.3 g/dL  Auto Neutrophil # : 4.09 K/uL  Auto Lymphocyte # : 1.59 K/uL  Auto Monocyte # : 0.61 K/uL  Auto Eosinophil # : 0.32 K/uL  Auto Basophil # : 0.07 K/uL  Auto Neutrophil % : 60.3 %  Auto Lymphocyte % : 23.5 %  Auto Monocyte % : 9.0 %  Auto Eosinophil % : 4.7 %  Auto Basophil % : 1.0 %    12-23    134[L]  |  101  |  33[H]  ----------------------------<  100[H]  3.7   |  25  |  1.25    Ca    8.7      23 Dec 2024 05:30  Phos  3.1     12-23  Mg     2.0     12-23              EEG: EPILEPSY PROGRESS NOTE:  Patient laying in bed, and son at bedside.  Domenica discussed at length with son regarding court appearance at bedside, and occurred at 12pm. Facial twitching now improved.      REVIEW OF SYSTEMS:  Unable to obtain 2/2 altered level of consciousness    MEDICATIONS:  acetylcysteine 10%  Inhalation 4 milliLiter(s) Inhalation every 6 hours  albuterol/ipratropium for Nebulization 3 milliLiter(s) Nebulizer every 6 hours  amLODIPine   Tablet 5 milliGRAM(s) Oral daily  artificial tears (preservative free) Ophthalmic Solution 1 Drop(s) Right EYE every 4 hours  cefepime   IVPB 2000 milliGRAM(s) IV Intermittent every 8 hours  chlorhexidine 0.12% Liquid 15 milliLiter(s) Oral Mucosa every 12 hours  chlorhexidine 2% Cloths 1 Application(s) Topical <User Schedule>  doxazosin 8 milliGRAM(s) Oral at bedtime  erythromycin   Ointment 1 Application(s) Right EYE at bedtime  fluticasone propionate 50 MICROgram(s)/spray Nasal Spray 1 Spray(s) Both Nostrils two times a day  fosphenytoin IVPB 100 milliGRAM(s) PE IV Intermittent three times a day  heparin   Injectable 5000 Unit(s) SubCutaneous every 8 hours  levETIRAcetam 1500 milliGRAM(s) Oral two times a day  ofloxacin 0.3% Solution 1 Drop(s) Right EYE four times a day  pantoprazole  Injectable 40 milliGRAM(s) IV Push daily  petrolatum Ophthalmic Ointment 1 Application(s) Both EYES two times a day  propranolol 10 milliGRAM(s) Oral every 8 hours  sodium chloride 0.65% Nasal 1 Spray(s) Both Nostrils two times a day  vancomycin  IVPB 1250 milliGRAM(s) IV Intermittent every 12 hours  vancomycin  IVPB        MEDICATIONS  (PRN):  acetaminophen   Oral Liquid .. 650 milliGRAM(s) Oral every 6 hours PRN Temp greater or equal to 38.5C (101.3F), Mild Pain (1 - 3)  oxyCODONE    Solution 5 milliGRAM(s) Oral every 4 hours PRN Moderate Pain (4 - 6)    VITAL SIGNS:  T(C): 36.6 (12-23-24 @ 09:00), Max: 38.5 (12-22-24 @ 22:05)  HR: 80 (12-23-24 @ 12:05) (74 - 100)  BP: 126/92 (12-23-24 @ 08:26) (100/62 - 126/92)  RR: 14 (12-23-24 @ 12:05) (14 - 19)  SpO2: 98% (12-23-24 @ 12:05) (97% - 100%)  Wt(kg): --    PHYSICAL EXAM:  Constitutional: Mid-aged man in bed trached on ventilator noted w/ L facial twitching   Ears, Nose, Throat: R facial weakness and R eye palsy noted    Cognitive:  Alert but disoriented to name, place and date. 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 Son speaking in Hungarian and directed patient to stick tongue out, move limbs but was unable to follow commands. Also did not perform eye blinking to yes and no questions. L facial twitching now improved. +Blink to threat on L eye only. R eye w/ erythema and ptosis. + facial weakness noted on R. No withdrawal to painful stimuli in B/L UE and RLE, but there was sensory signs noted w/ facial grimacing. LLE withdraws to pain. +1 reflexes in all limbs.     LABS:  CBC Full  -  ( 23 Dec 2024 05:30 )  WBC Count : 6.78 K/uL  RBC Count : 3.13 M/uL  Hemoglobin : 9.6 g/dL  Hematocrit : 29.7 %  Platelet Count - Automated : 192 K/uL  Mean Cell Volume : 94.9 fl  Mean Cell Hemoglobin : 30.7 pg  Mean Cell Hemoglobin Concentration : 32.3 g/dL  Auto Neutrophil # : 4.09 K/uL  Auto Lymphocyte # : 1.59 K/uL  Auto Monocyte # : 0.61 K/uL  Auto Eosinophil # : 0.32 K/uL  Auto Basophil # : 0.07 K/uL  Auto Neutrophil % : 60.3 %  Auto Lymphocyte % : 23.5 %  Auto Monocyte % : 9.0 %  Auto Eosinophil % : 4.7 %  Auto Basophil % : 1.0 %    12-23    134[L]  |  101  |  33[H]  ----------------------------<  100[H]  3.7   |  25  |  1.25    Ca    8.7      23 Dec 2024 05:30  Phos  3.1     12-23  Mg     2.0     12-23

## 2024-12-23 NOTE — PROGRESS NOTE ADULT - ASSESSMENT
44 year-old-male with unclear PMH (?stroke, MI) who was found down at work, intubated for airway protection and found to have acute large parenchymal hemorrhage within nabil with mass effect (+ acute/subacute right cerebellar infarct) in setting of hypertensive emergency, and R cerebellar stroke transferred to [facility] for further neurosurgical care. 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     Recommendations:    - Continue vEEG monitoring  - Continue Keppra 1500mg Q12hrs  - Continue fosphenytoin 100mg Q8hrs  - Maintain seizure/fall/aspiration precautions 44 year-old-male with unclear PMH (?stroke, MI) who was found down at work, intubated for airway protection and found to have acute large parenchymal hemorrhage within nabil with mass effect (+ acute/subacute right cerebellar infarct) in setting of hypertensive emergency, and R cerebellar stroke transferred to [facility] for further neurosurgical care. 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     Recommendations:    - Continue vEEG monitoring  - Please obtain dilantin level in AM  - Continue Keppra 1500mg Q12hrs  - Continue fosphenytoin 100mg Q8hrs  - Maintain seizure/fall/aspiration precautions

## 2024-12-24 LAB
ALBUMIN SERPL ELPH-MCNC: 3.1 G/DL — LOW (ref 3.3–5)
ALP SERPL-CCNC: 111 U/L — SIGNIFICANT CHANGE UP (ref 40–120)
ALT FLD-CCNC: 50 U/L — HIGH (ref 10–45)
ANION GAP SERPL CALC-SCNC: 10 MMOL/L — SIGNIFICANT CHANGE UP (ref 5–17)
ANION GAP SERPL CALC-SCNC: 9 MMOL/L — SIGNIFICANT CHANGE UP (ref 5–17)
AST SERPL-CCNC: 26 U/L — SIGNIFICANT CHANGE UP (ref 10–40)
BILIRUB SERPL-MCNC: 0.2 MG/DL — SIGNIFICANT CHANGE UP (ref 0.2–1.2)
BUN SERPL-MCNC: 34 MG/DL — HIGH (ref 7–23)
BUN SERPL-MCNC: 35 MG/DL — HIGH (ref 7–23)
CALCIUM SERPL-MCNC: 8.6 MG/DL — SIGNIFICANT CHANGE UP (ref 8.4–10.5)
CALCIUM SERPL-MCNC: 8.8 MG/DL — SIGNIFICANT CHANGE UP (ref 8.4–10.5)
CHLORIDE SERPL-SCNC: 103 MMOL/L — SIGNIFICANT CHANGE UP (ref 96–108)
CHLORIDE SERPL-SCNC: 98 MMOL/L — SIGNIFICANT CHANGE UP (ref 96–108)
CO2 SERPL-SCNC: 22 MMOL/L — SIGNIFICANT CHANGE UP (ref 22–31)
CO2 SERPL-SCNC: 23 MMOL/L — SIGNIFICANT CHANGE UP (ref 22–31)
CREAT SERPL-MCNC: 1.21 MG/DL — SIGNIFICANT CHANGE UP (ref 0.5–1.3)
CREAT SERPL-MCNC: 1.28 MG/DL — SIGNIFICANT CHANGE UP (ref 0.5–1.3)
EGFR: 70 ML/MIN/1.73M2 — SIGNIFICANT CHANGE UP
EGFR: 70 ML/MIN/1.73M2 — SIGNIFICANT CHANGE UP
EGFR: 75 ML/MIN/1.73M2 — SIGNIFICANT CHANGE UP
EGFR: 75 ML/MIN/1.73M2 — SIGNIFICANT CHANGE UP
GLUCOSE SERPL-MCNC: 103 MG/DL — HIGH (ref 70–99)
GLUCOSE SERPL-MCNC: 113 MG/DL — HIGH (ref 70–99)
HCT VFR BLD CALC: 32.7 % — LOW (ref 39–50)
HGB BLD-MCNC: 10.1 G/DL — LOW (ref 13–17)
MAGNESIUM SERPL-MCNC: 1.9 MG/DL — SIGNIFICANT CHANGE UP (ref 1.6–2.6)
MAGNESIUM SERPL-MCNC: 2 MG/DL — SIGNIFICANT CHANGE UP (ref 1.6–2.6)
MCHC RBC-ENTMCNC: 29.2 PG — SIGNIFICANT CHANGE UP (ref 27–34)
MCHC RBC-ENTMCNC: 30.9 G/DL — LOW (ref 32–36)
MCV RBC AUTO: 94.5 FL — SIGNIFICANT CHANGE UP (ref 80–100)
NRBC # BLD: 0 /100 WBCS — SIGNIFICANT CHANGE UP (ref 0–0)
NRBC BLD-RTO: 0 /100 WBCS — SIGNIFICANT CHANGE UP (ref 0–0)
OSMOLALITY UR: 416 MOSM/KG — SIGNIFICANT CHANGE UP (ref 300–900)
PHENYTOIN FREE SERPL-MCNC: 12.2 UG/ML — SIGNIFICANT CHANGE UP (ref 10–20)
PHOSPHATE SERPL-MCNC: 3.3 MG/DL — SIGNIFICANT CHANGE UP (ref 2.5–4.5)
PHOSPHATE SERPL-MCNC: 3.6 MG/DL — SIGNIFICANT CHANGE UP (ref 2.5–4.5)
PLATELET # BLD AUTO: 212 K/UL — SIGNIFICANT CHANGE UP (ref 150–400)
POTASSIUM SERPL-MCNC: 4.2 MMOL/L — SIGNIFICANT CHANGE UP (ref 3.5–5.3)
POTASSIUM SERPL-MCNC: 4.5 MMOL/L — SIGNIFICANT CHANGE UP (ref 3.5–5.3)
POTASSIUM SERPL-SCNC: 4.2 MMOL/L — SIGNIFICANT CHANGE UP (ref 3.5–5.3)
POTASSIUM SERPL-SCNC: 4.5 MMOL/L — SIGNIFICANT CHANGE UP (ref 3.5–5.3)
PROT SERPL-MCNC: 6.7 G/DL — SIGNIFICANT CHANGE UP (ref 6–8.3)
RBC # BLD: 3.46 M/UL — LOW (ref 4.2–5.8)
RBC # FLD: 14.3 % — SIGNIFICANT CHANGE UP (ref 10.3–14.5)
SODIUM SERPL-SCNC: 129 MMOL/L — LOW (ref 135–145)
SODIUM SERPL-SCNC: 136 MMOL/L — SIGNIFICANT CHANGE UP (ref 135–145)
SODIUM UR-SCNC: 101 MMOL/L — SIGNIFICANT CHANGE UP
WBC # BLD: 10.73 K/UL — HIGH (ref 3.8–10.5)
WBC # FLD AUTO: 10.73 K/UL — HIGH (ref 3.8–10.5)

## 2024-12-24 PROCEDURE — 71250 CT THORAX DX C-: CPT | Mod: 26

## 2024-12-24 PROCEDURE — 99232 SBSQ HOSP IP/OBS MODERATE 35: CPT

## 2024-12-24 PROCEDURE — 99233 SBSQ HOSP IP/OBS HIGH 50: CPT

## 2024-12-24 PROCEDURE — 95720 EEG PHY/QHP EA INCR W/VEEG: CPT

## 2024-12-24 PROCEDURE — 99231 SBSQ HOSP IP/OBS SF/LOW 25: CPT

## 2024-12-24 RX ORDER — BACLOFEN 10 MG/20ML
5 INJECTION INTRATHECAL EVERY 12 HOURS
Refills: 0 | Status: DISCONTINUED | OUTPATIENT
Start: 2024-12-24 | End: 2025-02-20

## 2024-12-24 RX ORDER — BACLOFEN 10 MG/20ML
5 INJECTION INTRATHECAL EVERY 12 HOURS
Refills: 0 | Status: DISCONTINUED | OUTPATIENT
Start: 2024-12-24 | End: 2024-12-24

## 2024-12-24 RX ORDER — SODIUM CHLORIDE 3 G/100ML
250 INJECTION, SOLUTION INTRAVENOUS ONCE
Refills: 0 | Status: COMPLETED | OUTPATIENT
Start: 2024-12-24 | End: 2024-12-24

## 2024-12-24 RX ADMIN — Medication 1 APPLICATION(S): at 18:42

## 2024-12-24 RX ADMIN — CEFEPIME 100 MILLIGRAM(S): 2 INJECTION, POWDER, FOR SOLUTION INTRAVENOUS at 21:39

## 2024-12-24 RX ADMIN — Medication 1 DROP(S): at 02:10

## 2024-12-24 RX ADMIN — IPRATROPIUM BROMIDE AND ALBUTEROL SULFATE 3 MILLILITER(S): .5; 2.5 SOLUTION RESPIRATORY (INHALATION) at 03:19

## 2024-12-24 RX ADMIN — Medication 1 DROP(S): at 21:39

## 2024-12-24 RX ADMIN — AMLODIPINE BESYLATE 5 MILLIGRAM(S): 10 TABLET ORAL at 05:13

## 2024-12-24 RX ADMIN — Medication 1 DROP(S): at 11:01

## 2024-12-24 RX ADMIN — CEFEPIME 100 MILLIGRAM(S): 2 INJECTION, POWDER, FOR SOLUTION INTRAVENOUS at 16:10

## 2024-12-24 RX ADMIN — IPRATROPIUM BROMIDE AND ALBUTEROL SULFATE 3 MILLILITER(S): .5; 2.5 SOLUTION RESPIRATORY (INHALATION) at 21:37

## 2024-12-24 RX ADMIN — Medication 40 MILLIGRAM(S): at 12:45

## 2024-12-24 RX ADMIN — Medication 1 APPLICATION(S): at 06:04

## 2024-12-24 RX ADMIN — LEVETIRACETAM 1500 MILLIGRAM(S): 10 INJECTION, SOLUTION INTRAVENOUS at 05:12

## 2024-12-24 RX ADMIN — IPRATROPIUM BROMIDE AND ALBUTEROL SULFATE 3 MILLILITER(S): .5; 2.5 SOLUTION RESPIRATORY (INHALATION) at 11:02

## 2024-12-24 RX ADMIN — Medication 15 MILLILITER(S): at 18:41

## 2024-12-24 RX ADMIN — HEPARIN SODIUM 5000 UNIT(S): 1000 INJECTION INTRAVENOUS; SUBCUTANEOUS at 21:40

## 2024-12-24 RX ADMIN — ACETYLCYSTEINE 4 MILLILITER(S): 200 INHALANT RESPIRATORY (INHALATION) at 03:18

## 2024-12-24 RX ADMIN — ACETYLCYSTEINE 4 MILLILITER(S): 200 INHALANT RESPIRATORY (INHALATION) at 21:40

## 2024-12-24 RX ADMIN — SODIUM CHLORIDE 62.5 MILLILITER(S): 3 INJECTION, SOLUTION INTRAVENOUS at 11:01

## 2024-12-24 RX ADMIN — DOXAZOSIN MESYLATE 8 MILLIGRAM(S): 8 TABLET ORAL at 21:39

## 2024-12-24 RX ADMIN — ERYTHROMYCIN 1 APPLICATION(S): 5 OINTMENT OPHTHALMIC at 21:40

## 2024-12-24 RX ADMIN — LEVETIRACETAM 1500 MILLIGRAM(S): 10 INJECTION, SOLUTION INTRAVENOUS at 18:41

## 2024-12-24 RX ADMIN — Medication 1 APPLICATION(S): at 07:04

## 2024-12-24 RX ADMIN — FOSPHENYTOIN SODIUM 104 MILLIGRAM(S) PE: 50 INJECTION INTRAMUSCULAR; INTRAVENOUS at 21:40

## 2024-12-24 RX ADMIN — FOSPHENYTOIN SODIUM 104 MILLIGRAM(S) PE: 50 INJECTION INTRAMUSCULAR; INTRAVENOUS at 05:11

## 2024-12-24 RX ADMIN — Medication 40 MILLIEQUIVALENT(S): at 07:45

## 2024-12-24 RX ADMIN — FOSPHENYTOIN SODIUM 104 MILLIGRAM(S) PE: 50 INJECTION INTRAMUSCULAR; INTRAVENOUS at 16:11

## 2024-12-24 RX ADMIN — IPRATROPIUM BROMIDE AND ALBUTEROL SULFATE 3 MILLILITER(S): .5; 2.5 SOLUTION RESPIRATORY (INHALATION) at 16:21

## 2024-12-24 RX ADMIN — HEPARIN SODIUM 5000 UNIT(S): 1000 INJECTION INTRAVENOUS; SUBCUTANEOUS at 05:12

## 2024-12-24 RX ADMIN — CEFEPIME 100 MILLIGRAM(S): 2 INJECTION, POWDER, FOR SOLUTION INTRAVENOUS at 05:11

## 2024-12-24 RX ADMIN — OFLOXACIN 1 DROP(S): 3 SOLUTION OPHTHALMIC at 23:49

## 2024-12-24 RX ADMIN — Medication 1 DROP(S): at 18:41

## 2024-12-24 RX ADMIN — Medication 15 MILLILITER(S): at 05:12

## 2024-12-24 RX ADMIN — ACETYLCYSTEINE 4 MILLILITER(S): 200 INHALANT RESPIRATORY (INHALATION) at 11:02

## 2024-12-24 RX ADMIN — FLUTICASONE PROPIONATE 1 SPRAY(S): 50 SPRAY, METERED NASAL at 05:10

## 2024-12-24 RX ADMIN — FLUTICASONE PROPIONATE 1 SPRAY(S): 50 SPRAY, METERED NASAL at 18:43

## 2024-12-24 RX ADMIN — Medication 650 MILLIGRAM(S): at 08:39

## 2024-12-24 RX ADMIN — OFLOXACIN 1 DROP(S): 3 SOLUTION OPHTHALMIC at 00:23

## 2024-12-24 RX ADMIN — ACETYLCYSTEINE 4 MILLILITER(S): 200 INHALANT RESPIRATORY (INHALATION) at 16:21

## 2024-12-24 RX ADMIN — OFLOXACIN 1 DROP(S): 3 SOLUTION OPHTHALMIC at 12:45

## 2024-12-24 RX ADMIN — HEPARIN SODIUM 5000 UNIT(S): 1000 INJECTION INTRAVENOUS; SUBCUTANEOUS at 16:11

## 2024-12-24 RX ADMIN — OFLOXACIN 1 DROP(S): 3 SOLUTION OPHTHALMIC at 18:42

## 2024-12-24 RX ADMIN — OFLOXACIN 1 DROP(S): 3 SOLUTION OPHTHALMIC at 06:05

## 2024-12-24 RX ADMIN — Medication 1 DROP(S): at 16:11

## 2024-12-24 RX ADMIN — BACLOFEN 5 MILLIGRAM(S): 10 INJECTION INTRATHECAL at 05:11

## 2024-12-24 RX ADMIN — BACLOFEN 5 MILLIGRAM(S): 10 INJECTION INTRATHECAL at 16:22

## 2024-12-24 RX ADMIN — Medication 1 SPRAY(S): at 05:10

## 2024-12-24 RX ADMIN — Medication 1 SPRAY(S): at 18:42

## 2024-12-24 RX ADMIN — Medication 1 DROP(S): at 05:11

## 2024-12-24 NOTE — PROGRESS NOTE ADULT - SUBJECTIVE AND OBJECTIVE BOX
HPI:  Unknown age male, unknown past medical history (reported stroke and MI by coworkers) presented to [facility] with AMS, Pt was working at [employer] when he was found down by coworkers. EMS called and pt brought to [facility] ED. Intubated, sedated, started on cardene for SBPs in 200s. CT head showed brain stem bleed. Transferred to NSICU for further management.  (30 Sep 2024 12:55)    S/Overnight events:  GEORGE ovn, neuro stable. Started baclofen 5 mg q12 for hiccups       Hospital Course:   : transferred from [facility]. A line placed. Versed dc'd. Roomate Prasanth at bedside, states pt has family in [place], cannot confirm medications or PMH other than stroke and MI. 250cc bolus 3% given. LR switched to NS. hydralazine 25q8 started, 3% started, switched propofol to precedex   10/1: stability CTH done. Added labetalol, started TF. Palliative consulted. ethics consulted to determine surrogate. febrile 103, pan cx sent  10/2: BD 2, GEORGE overnight. TF resumed. Desatt'd to 80s, FiO2 inc. to 50. Fentanyl given, ABG, CXR ordered. Maxxed on precedex, started on propofol for DARIEN -4 - -5. Precedex dc'd. Duonebs, mucomyst, hypertonic added. 3% dc'd. Cardene dc'd. Start vanc/CTX. Increased labetalol 200q8. MRSA negative, dc'd vanc. ETT pulled back 2cm x 2, good positioning after confirmatory chest xray. Ethics attempting to establish HCP with family. Na 159, starting FW 250q6 for range 150-155.   10/3: BD3, GEORGE o/n, neuro stable. Na elevating, FW increased to 300q6. Dc'd bowel reg for diarrhea. vEEG started. SQH 5000q8 tonight.   10/4: BD 4, albumn bolus, incr. LR to 80 2/2 incr. in Cr, LR to 10 0cc/hr for uptrending Cr. Started 7% hypersal for 48hrs and SL atropine for inline/oral thick secretions. Dc'd CTX and started ancef for MSSA in the sputum. Nephrology consulted for CKD, f/u recs. SBP 170s, given hydralazine 10mg IVP.   10/5: BD5, o/n 10mg IVP hydralazine given for SBP 170s and started on hydralazine 25q8 via OGT. 10mg IV push labetalol for SBP > 160s. RT placed for diarrhea.   10/6: BD6, o/n FW increased to 350q4 per nephrology recs. IV tylenol for temp 100.6, SBp 160s presumed uncomfortable.   10/7: BD7, overnight pancultured for temp 101.8F.   10/8: BD8. GEORGE. Cr bumped. decreased LR to 75cc/hr. Adding simethicone ATC. incr hydralazine 50mgTID. Incr labetalol 300mgTID. Na 145, decreased FWF to 250q6. Start precedex. FENa consistent with intrinsic kidney injury. Pend repeat renal US. Retaining up to 1.3L, bladder scans q6, straight cath PRN  10/9: BD 9. GEORGE overnight. Neuro stable. abd xray for distention w non-specific gas pattern, OGT to LIWS for morning. duonebs/mucomyst to q8 for improving secretions. Changed tube feeds to Jevity 1.5 20cc/hr, low rate due to abdominal distention, nepro dense and more difficult to digest. Tolerating CPAP, confirmed by ABG.   10/10: BD 10. GEORGE overnight. Neuro stable. (+) gabriel for urinary retention on bladder scan. inc TF to goal rate of 40cc/hr. family leaning toward pursuing trach/PEG. 1/2 amp for FS 81.   10/11: BD 11. GEORGE overnight. Neuro stable. Trach/PEG consults placed.   10/12: BD 12. GEORGE overnight. Neuro stable. MRI brain complete.   10/13: BD 13. Increase flomax. Hold SQH after PM dose for trach tm. IVL.   10/14: BD 14. GEORGE overnight, remains on AC/VC. Gabriel placed for urinary retention. Dc'd free water.  S/p trach with pulm. NGT placed and CXR confirmed in good position.   10/15: BD 15, GEORGE ovn. resumed feeds. spiked 101, pan cx sent.   10/16: BD 16. GEORGE ovn. Lokelma 5mg for K+ 5.4. Started vanc q 24/zosyn for empiric PNA coverage, IVF to 100/hr. PEG held for fever.   10/17: BD 17,  ordered serum osm and urine osm for am. Started sinemet for neurostimulation. Increased cardura to 0.8. Started FW 100q4, dc'd IVF. MRSA negative, dc'd vanc. NGT replaced d/t coiling.   10/18: BD 18, GEORGE overnight, neuro stable. Amantadine added for neurostim. zosyn changed to unasyn for acinetobacter baumannii, failed TOV and required SC  10/19: BD 19, GEORGE ovn. cardura 2mg added for retention. labetalol decreased 200q8, hydralazine decreased 25q8. Gabriel replaced.   10/20: BD20, GEORGE overnight. NGT dislodged, replaced. PEG tomorrow w/ gen surg, FW increased to 150q4 and labetalol decreased to 100q8, lokelma given for hyperkalemia.   10/21: BD 21. POD0 PEG placement with Gen surg. decr labetolol to 50q8, incr. cardura to 0.4, started lokelma and phoslo, dc gabriel POD0 PEG placement with Gen surg.  10/22: BD 22. Plan to start TF today via PEG. dc labetalol, Following ophtho recs. Increased apnea settings - found to be in cheyne-moe respiration. CPAP 5/5.  10/23: BD 23. hydralazine d/c'd, trach collar trial today. Rectal tube placed at 6am.  10/24: BD24, o/n lokelma held due to diarrhea. Free water 100q6 resumed. dc'd tamsulosin, amantadine. Incr'd cardura to 8mg qhs. Dc'd FW. Switched jevity to nepro. gabriel placed for high urine output. Started SL atropine for oral secretions. Dc'd free water.  10/25: BD25, o/n decreased suctioning requirements to > q4hrs, GEORGE. Cr improving, cont phoslo, lokelma held at this time. Gabriel placed yest, cont. Tolerating trach collar. Given 500cc plasmalyte bolus for ANIKA. Dc'd sinemet.   10/26: BD26, o/n resumed lokelma 5mg daily and resumed 100cc free water q6hrs. Change in neuro status with new right pupillary dilation with anisocoria (right pupil 6mm fixed and left pupil 3mm briskly reactive). Given 23.4% NaCl bullet, taken for emergent CTH showing mostly resolved pontine hemorrhage, continued brainstem hypodensity likely edema d/t hemorrhage, no new hemorrhage or infarct, no herniation, mild increase in size of left lateral ventricle. Vitals remaine stable. Na goal > 140.   10/27: BD27, o/n GEORGE.Neuro stable. Pend stepdown with airway bed.   10/28: BD 28. GEORGE overnight. Neuro stable. Miralax ordered. Gabriel removed, pending TOV.  10/29: BD 29. GEORGE o/n. Given 2L NS over 8 hrs for increased BUN/Cr ratio. Gabriel placed for frequent straight cath.   10/30: BD 30.   10/31: BD 31. GEORGE overnight. Na 149, increased free water to 200q6. 1L NS for uptrending BUN.   : BD 32. GEORGE overnight. Given 1L NS for dehydration. Na 146, increased FW to 250q6.   : BD 33 GEORGE overnight, neuro stable, given 500cc bolus for net negative status and tachycardia   11/3: BD 34, GEORGE overnight, neuro stable. Patient remains tachycardic, EKG showing sinus tachycardia, given additional 500cc NS bolus. Febrile to 101.9F, pan cultured (without UA), CXR WNL, given tylenol.   : BD 35, GEORGE overnight, neuro stable. Given 1L NS for tachycardia. sputum (+) for stenotropohomas maltophilia.   : BD 36 GEORGE overnight, neuro stable. Vancomycin dc'd. Chest PT BID. ID consulted, cont zosyn.  : BD 37. blood cx + klebsiella dc zosyn changed to cefepime, CTAP ordered, rpt blood cx sent.    : BD 38. Pending CT A/P, given 250cc bolus and starting maintenance fluids overnight. Pending CT A/P after bolus   : BD 39. CT CAP negative for infection.   : BD 40. GEORGE overnight.  11/10: BD 41. GEORGE overnight. desat to 85 on trach collar, O2 inc to 10L and 100%, O2 sat inc to 95. pt tachy to 110s, euvolemic. given tylenol. ABG and CXR ordered. spiked fever, pancultured, RVP negative. AM ABG w pO2 79, rpt w pO2 79. pt appears comfortable, satting 94%.   : BD 42. GEORGE overnight. pt became tachy to 130s, desat to 90 on 100% FiO2 and 10L. suctioned, (+) productive cough. temp 101.4, given 1g IV tylenol and 500cc NS bolus for euvolemia. fever and HR downtrending. LE dopplers negative for dvt  : BD 43, GEORGE ovn, fever and HR downtrending, satting 97% 70% FIO2  : BD 44, GEORGE ovn. started standing tylenol x24 hours for tachycardia. desat to 80s, o2 increased. CXR stable, pending CTA PE protocol.   : BD 45, GEORGE overnight, neuro stable. resp therapy dec FiO2 to 70%.   11/15: BD 46, GEORGE overnight, neuro stable.  Rapid called for desaturation 30s, tachycardic 140s. Patient bagged, 100% fio2, heavily suctioned. CXR/POCUS unremarkable. ABG c/w desaturation. WBC 14.71. Afebrile. O2 improved to 90s and patient upgraded to ICU. ABG paO2 30s improved to 89 on vent. IV Tylenol x 1, sputum sent. Start protonix while o-n vent.   : BD 47. POCUS showed collapsable IVCF, given 1L bolus. Vanco/Cefpime added empriic for PNA, NGT feeds restarted. MRSA swab neg, Vanc DC'd.   : BD 48. GEORGE overnight. 1l bolus for tachycardia. Spiked to 101, cultured. 500cc bolus for tachycardia, tachy to 148 given 25mcg fentanyl, 250cc albumin, 1.5L bolus. 5 IV lopressor with response HR to 100s. +Stenotrophomonas on sputum cx.   : BD 49. GEORGE overnight. Consulted ID, cefepime switched to bactrim x7days. Started hydrochlorothiazine 12.5mg daily.  : BD 50. Tachy 120s, given tylenol and 500cc NS. Tolerating 5/5, switched to TCx. Holding phos binder. D/c Bactrim. D/c gabriel, f/u TOV. Dc'd PPI.   : BD 51. GEORGE ovn. 1600 satting low 90s, mildly tachy to 110s, afebrile, RR wnl. O2 improved to mid 90s while inc O2 to 100% on TCx. CPAP 5/5 placed back on.  : BD 52, GEORGE ovn, tolerating CPAP 5/5. Switch to trach collar during the day if tolerating well. HCTZ held for Cr bump, straight cath frequence increased to q4  : BD 53, GEORGE ovn. Resumed phoslo. Gabriel placed. Resumed HCTZ.   : BD 54. Holding tylenol in setting of possible fever, will require pan cx if febrile. Cr improved today. Cont CPAP. Bowel regimen held i/s/o diarrhea. FOBT negative.  : BD 55. GEORGE overnight. Neuro stable. HCTZ dc'ed, started lisinopril 5. Lokelma dc'ed for K 3.7.   : BD 56, GEORGE overnight. Neuro stable. dc'd lisinopril 5mg. Gabriel dc'd. TOV. 1545 noted to be hypotensive, MAP 50, in supine position on chair, HR 60s, afebrile, O2 96%. Given 1L cc NS bolus, placed back on bed in reverse trendelenberg, improved to map of 66. Neostick at bedside. Vitals check q1h. Dc'ed amlodipine. Failed TOV, bladder scan q6, sc prn. Added back Senna.   : BD 57, GEORGE overnight. Neuro stable. Dc'd phoslo.   : BD 58, GEORGE overnight. Neuro stable.    : BD 59. Gabriel replaced.   : GEORGE.  : GEORGE, neuro stable.   : BD 62, GEORGE overnight  : BD 63, GEORGE overnight.; Given 1L bolus of LR for uptrending BUN/Cr.  12/3: BD 64. Reinstated eye gtt/moisture chamber given increased Rt eye injection  : BD 65. GEORGE overnight. Attempted to speak with ophtho regarding eyelid weight/closure but no answer, full mailbox.   : BD 66, GEORGE overnight, bowel regimen increased and had BM.   : BD 67, GEORGE overnight, neuro stable.  : GEORGE overnight, neuro stable. /110s, given x1 hydralazine 10 mg IVP. Restarted home amlodipine 5mg.  : GEORGE. OOB to chair.     : GEORGE, mucomyst added for thick secretions, simethicone for abd distension, abd xray with stool burden, increased bowel regimen.   : GEORGE overnight.   : GEORGE overnight.   : GEORGE overnight  12/15: o/n Patient became tachycardic HR 120s and 10 minutes later O2 sat dropped as low as 89%. Patient suctioned without improvement in O2 sat and tube feeds found in suction catheter. TFs held.  STAT CXR ordered. STAT labs sent. Respiratory therapist called to bedside and patient trach connected to ventilator. After connection to ventilator and further suctioning O2 sat improved to 97% but patient HR remains 120-130s. Upgraded to NSICU for further management. Vancomycin and zosyn started. CTA  chest PE protocol and CTH ordered. Blood cultures sent.given 500cc bolus, rpt ABG sent pO2 243, CTH and CTA chest done. FS while NPO, FiO2 dec 50 pending ABG. sputum cx positive for few GPC and GNR.   : GEORGE overnight, restarted amlodipine, troponin 75   : GEORGE overnight, neuro stable. Attempted CPAP this morning, did not tolerate and back on full vent support. Dc'd Vanc. Tachycardia to 140s, noted extremities to be twitching along with jaw twitching. Given 2g of Keppra, total 4mg ativan and placed on EEG, full set of labs and lactate negative. Resumed trickle feeds at 20cc/hr. Given 250cc albumin for tachycardia.   : GEORGE overnight. Neuro stable. CTH stable. EEG negative and dc'd.   : GEORGE overnight. neuro stable. SIMV most of day, AC/VC at night.   : GEORGE overnight, neuro stable. remains on VC/AC  : GEORGE ovn. 1L bolus for tachy to 120s-130s.   : GEORGE ovn. Tachy to 120s, given tylenol and IVF. 2mg IV ativan given for L jaw twitching. Sx resolved for 3 minutes and started again. Epilepsy contacted. Given 2mg IV ativan. Continued twitching. Increased keppra to 1500mg BID, 2mg ativan given. Propranolol started for refractory tachycardia. vEEG ordered. albumin given. POCUS performed, no b lines. Started on fosphenytoin, loaded w 20mg/kg then 100mg TID. febrile, pancx, started cefepime 2g q8, vancomycin  : GEORGE ovn. +focal motor seizures on eeg. ID consulted. Vancomycin dc'ed as per ID.  : GEORGE ovn, neuro stable. Baclofen 5 mg q12 started for hiccups.         Vital Signs Last 24 Hrs  T(C): 36.9 (23 Dec 2024 21:08), Max: 37.4 (23 Dec 2024 14:00)  T(F): 98.4 (23 Dec 2024 21:08), Max: 99.4 (23 Dec 2024 14:00)  HR: 82 (24 Dec 2024 00:10) (74 - 82)  BP: 104/73 (24 Dec 2024 00:10) (100/68 - 126/92)  BP(mean): 85 (24 Dec 2024 00:10) (76 - 105)  RR: 15 (24 Dec 2024 00:10) (14 - 19)  SpO2: 98% (24 Dec 2024 00:10) (98% - 100%)    Parameters below as of 24 Dec 2024 00:10  Patient On (Oxygen Delivery Method): ventilator  O2 Flow (L/min): 40      I&O's Detail    22 Dec 2024 07:01  -  23 Dec 2024 07:00  --------------------------------------------------------  IN:    Enteral Tube Flush: 500 mL    IV PiggyBack: 350 mL    Miscellaneous Tube Feedin mL  Total IN: 1810 mL    OUT:    Indwelling Catheter - Urethral (mL): 1800 mL  Total OUT: 1800 mL    Total NET: 10 mL      23 Dec 2024 07:  -  24 Dec 2024 03:05  --------------------------------------------------------  IN:    Enteral Tube Flush: 300 mL    Free Water: 1000 mL    IV PiggyBack: 450 mL    Miscellaneous Tube Feedin mL  Total IN: 2830 mL    OUT:    Indwelling Catheter - Urethral (mL): 1575 mL  Total OUT: 1575 mL    Total NET: 1255 mL        I&O's Summary    22 Dec 2024 07:  -  23 Dec 2024 07:00  --------------------------------------------------------  IN: 1810 mL / OUT: 1800 mL / NET: 10 mL    23 Dec 2024 07:01  -  24 Dec 2024 03:05  --------------------------------------------------------  IN: 2830 mL / OUT: 1575 mL / NET: 1255 mL        PHYSICAL EXAM:  GEN: laying in bed, NAD  NEURO: Unable to assess AO status 2/2 mental status. not FC, OE to voice, face symmetric. PERRL, vertical EOMs only. Wd to noxious stimuli x4.  CV: RRR +S1/S2  PULM: CTAB, +trach  GI: Abd soft, NT/ND  EXT: ext warm, dry, nontender    TUBES/LINES:  [] CVC  [] A-line  [] Lumbar Drain  [] Ventriculostomy  [] Other    DIET:  [] NPO  [] Mechanical  [x] Tube feeds    LABS:    Urinalysis Basic - ( 23 Dec 2024 05:30 )    Color: x / Appearance: x / SG: x / pH: x  Gluc: 100 mg/dL / Ketone: x  / Bili: x / Urobili: x   Blood: x / Protein: x / Nitrite: x   Leuk Esterase: x / RBC: x / WBC x   Sq Epi: x / Non Sq Epi: x / Bacteria: x          CAPILLARY BLOOD GLUCOSE      POCT Blood Glucose.: 121 mg/dL (23 Dec 2024 11:17)      Drug Levels: [] N/A    CSF Analysis: [] N/A      Allergies    Allergy Status Unknown    Intolerances      MEDICATIONS:  Antibiotics:  cefepime   IVPB 2000 milliGRAM(s) IV Intermittent every 8 hours    Neuro:  acetaminophen   Oral Liquid .. 650 milliGRAM(s) Oral every 6 hours PRN  baclofen 5 milliGRAM(s) Oral every 12 hours  fosphenytoin IVPB 100 milliGRAM(s) PE IV Intermittent three times a day  levETIRAcetam 1500 milliGRAM(s) Oral two times a day  oxyCODONE    Solution 5 milliGRAM(s) Oral every 4 hours PRN    Anticoagulation:  heparin   Injectable 5000 Unit(s) SubCutaneous every 8 hours    OTHER:  acetylcysteine 10%  Inhalation 4 milliLiter(s) Inhalation every 6 hours  albuterol/ipratropium for Nebulization 3 milliLiter(s) Nebulizer every 6 hours  amLODIPine   Tablet 5 milliGRAM(s) Oral daily  artificial tears (preservative free) Ophthalmic Solution 1 Drop(s) Right EYE every 4 hours  chlorhexidine 0.12% Liquid 15 milliLiter(s) Oral Mucosa every 12 hours  chlorhexidine 2% Cloths 1 Application(s) Topical <User Schedule>  doxazosin 8 milliGRAM(s) Oral at bedtime  erythromycin   Ointment 1 Application(s) Right EYE at bedtime  fluticasone propionate 50 MICROgram(s)/spray Nasal Spray 1 Spray(s) Both Nostrils two times a day  ofloxacin 0.3% Solution 1 Drop(s) Right EYE four times a day  pantoprazole  Injectable 40 milliGRAM(s) IV Push daily  petrolatum Ophthalmic Ointment 1 Application(s) Both EYES two times a day  propranolol 10 milliGRAM(s) Oral every 8 hours  sodium chloride 0.65% Nasal 1 Spray(s) Both Nostrils two times a day    IVF:    CULTURES:  Culture Results:   No growth at 24 hours ( @ 17:45)  Culture Results:   No growth at 24 hours ( @ 17:45)    RADIOLOGY & ADDITIONAL TESTS:      ASSESSMENT:  43 y/o PMH ?stroke/MI present to [facility] after collapsing at work. Decorticate posturing, vomiting, intubated for airway protection. 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 Transferred to [facility] for further management. s/p trach 10/14. s/p peg 10/21. Re-upgrade to ICU 2/2 desaturation event and suctioning requirements 11/15. Re-upgrade to NSICU 12/15 2/2 desaturation and tachycardia.    Neuro:  - neuro/vitals q4h  - pain control: tylenol prn  - seizure tx: keppra 1500mg BID, fosphenytoin 100mg TID  - vEEG (10/3-) negative, (10/17-10/19) negative, (-) negative, (- ) +focal motor seizures  - CTH : enlarged pontine hemorrhage, CTH 10/3: stable, CTH 10/25: mostly resolved pontine hemorrhage, CTH 12/15: R mastoid air cell opacification; acute otitis media vs sterile effusion, CTH : stable.  - MRI brain 10/12: parenchymal hemorrhage, acute/subacute R cerebellar stroke    - stroke core measures, stroke neuro signed off    CV:  - -160  - tachycardia: propranolol 10mg q8  - HTN: amlodipine 5mg, hold lisinopril 5mg   - echo () EF 75%, repeat : 57%    Resp:  - trach to vent, AC/VC 40/400/14/6  - CTA PE protocol 12/15 negative   - Secretions: duonebs/mucomyst/chest PT q6h   - baclofen 5q12 for hiccups     GI:  - TF via PEG (placed 10/21 by gen surg)  - bowel regimen held for loose stool, last BM     Renal:  - IVL  - Urinary retenion: Gabriel replaced 12/15  - CKD: trend BUN/Cr  - renal US 10/1: echogenicity c/w chronic med renal dz, repeat 10/8: inc renal echogeicity, c/f medical renal disease w increased hydro     Endo:  - A1c 5.4    Heme:  - DVT ppx: SCDs, SQH 5000u q8h   - LE dopplers negative     ID:  - last pan cx   - empiric PNA: cefepime/vanc (- ), s/p zosyn (12/15-), s/p vanc (12/15-)  - s/p Stenotrophomonas maltophilia PNA: s/p Bactrim (-) s/p Cefepime (-)  - 11/3, (+) sputum for stenotrophomas maltophlia, blood cx (+) klebsiella, cefepime 2gq12 ( - )   - empiric tx: s/p zosyn (11/3-) , s/p vanc  (11/3-)  - S/p Ancef (10/4-10/14) for PNA, and s/p Unasyn (10/18-10/23) +actinobacter baumanii     MISC:  - ophtho consult for keratitis  - erythromycin ointment R eye q4hrs, ofloxacin ointment R eye QID, artificial tears R eye q2hrs, moisture chamber at bedtime    Dispo: SDU status, full code, pending placement and guardianship hearing     D/w Dr. D'Amico

## 2024-12-24 NOTE — PROGRESS NOTE ADULT - SUBJECTIVE AND OBJECTIVE BOX
INTERVAL HPI/OVERNIGHT EVENTS:  Tm 101 X 1    ROS:  Unobtainable - nonverbal      ANTIBIOTICS/RELEVANT:    Cefepime 2 g IV q8h (11/6-11/12; 11/16-11/18;  12/22-present)    Amp-sulbactam 10/18-10/23  Cefazolin 10/4-10/14  Ceftriaxone 10/2-10/4  Pip-tazo 10/16-10/18, 11/3-11/6, 12/15-12/17  TMP/SX 11/18-11/19  Vancomycin 1250 mg IV q12h (10/16, 11/3-11/5, 12/22-present)    Vital Signs Last 24 Hrs  T(C): 37.6 (24 Dec 2024 14:09), Max: 38.3 (24 Dec 2024 09:13)  T(F): 99.6 (24 Dec 2024 14:09), Max: 101 (24 Dec 2024 09:13)  HR: 84 (24 Dec 2024 16:05) (76 - 104)  BP: 108/74 (24 Dec 2024 16:05) (100/69 - 125/86)  BP(mean): 86 (24 Dec 2024 16:05) (80 - 101)  RR: 19 (24 Dec 2024 16:05) (15 - 19)  SpO2: 99% (24 Dec 2024 16:05) (96% - 100%)    Parameters below as of 24 Dec 2024 16:05  Patient On (Oxygen Delivery Method): ventilator    O2 Concentration (%): 40    PHYSICAL EXAM:  Constitutional:  Awake, startle on L, not following commands  HEENT:  EEG leads in place, sclerae anicteric, conjunctivae clear, PERRL.  No nasal exudate or sinus tenderness;  Unable to visualize pharynx  Neck:  Supple, +trach - site clean  Respiratory:  Clear bilaterally anteriorly  Cardiovascular:  RRR, S1S2, no murmur appreciated  Gastrointestinal:  PEG site clean, normoactive BS, soft, NT, no masses, guarding or rebound.  No HSM  :  Vuong catheter   Extremities:  No edema      LABS:                        10.1   10.73 )-----------( 212      ( 24 Dec 2024 05:30 )             32.7         12-24    136  |  103  |  34[H]  ----------------------------<  113[H]  4.2   |  23  |  1.28    Ca    8.6      24 Dec 2024 15:02  Phos  3.6     12-24  Mg     2.0     12-24    TPro  6.7  /  Alb  3.1[L]  /  TBili  0.2  /  DBili  x   /  AST  26  /  ALT  50[H]  /  AlkPhos  111  12-24      Urinalysis Basic - ( 24 Dec 2024 15:02 )    Color: x / Appearance: x / SG: x / pH: x  Gluc: 113 mg/dL / Ketone: x  / Bili: x / Urobili: x   Blood: x / Protein: x / Nitrite: x   Leuk Esterase: x / RBC: x / WBC x   Sq Epi: x / Non Sq Epi: x / Bacteria: x        MICROBIOLOGY:        RADIOLOGY & ADDITIONAL STUDIES:

## 2024-12-24 NOTE — PROGRESS NOTE ADULT - SUBJECTIVE AND OBJECTIVE BOX
Overnight he had a fever , and again this morning , on vEEG he is still having Epileptiform activity     MEDICATIONS  (STANDING):  acetylcysteine 10%  Inhalation 4 milliLiter(s) Inhalation every 6 hours  albuterol/ipratropium for Nebulization 3 milliLiter(s) Nebulizer every 6 hours  amLODIPine   Tablet 5 milliGRAM(s) Oral daily  artificial tears (preservative free) Ophthalmic Solution 1 Drop(s) Right EYE every 4 hours  chlorhexidine 0.12% Liquid 15 milliLiter(s) Oral Mucosa every 12 hours  chlorhexidine 2% Cloths 1 Application(s) Topical <User Schedule>  doxazosin 8 milliGRAM(s) Oral at bedtime  erythromycin   Ointment 1 Application(s) Right EYE at bedtime  fluticasone propionate 50 MICROgram(s)/spray Nasal Spray 1 Spray(s) Both Nostrils two times a day  heparin   Injectable 5000 Unit(s) SubCutaneous every 8 hours  levETIRAcetam 1500 milliGRAM(s) Oral two times a day  ofloxacin 0.3% Solution 1 Drop(s) Right EYE four times a day  pantoprazole  Injectable 40 milliGRAM(s) IV Push daily  petrolatum Ophthalmic Ointment 1 Application(s) Both EYES two times a day  propranolol 10 milliGRAM(s) Oral every 8 hours  sodium chloride 0.65% Nasal 1 Spray(s) Both Nostrils two times a day    MEDICATIONS  (PRN):  acetaminophen   Oral Liquid .. 650 milliGRAM(s) Oral every 6 hours PRN Temp greater or equal to 38.5C (101.3F), Mild Pain (1 - 3)  oxyCODONE    Solution 5 milliGRAM(s) Oral every 4 hours PRN Moderate Pain (4 - 6)    ICU Vital Signs Last 24 Hrs  T(C): 37.3 (22 Dec 2024 09:00), Max: 37.8 (21 Dec 2024 13:38)  T(F): 99.2 (22 Dec 2024 09:00), Max: 100.1 (21 Dec 2024 13:38)  HR: 116 (22 Dec 2024 08:42) (96 - 116)  BP: 125/88 (22 Dec 2024 08:40) (122/86 - 139/97)  BP(mean): 102 (22 Dec 2024 08:40) (98 - 111)  ABP: --  ABP(mean): --  RR: 18 (22 Dec 2024 08:42) (14 - 18)  SpO2: 96% (22 Dec 2024 08:42) (95% - 98%)    O2 Parameters below as of 22 Dec 2024 08:42  Patient On (Oxygen Delivery Method): ventilator    O2 Concentration (%): 40      Orthostatic VS    Daily     Daily   I&O's Summary    21 Dec 2024 07:01  -  22 Dec 2024 07:00  --------------------------------------------------------  IN: 2140 mL / OUT: 1715 mL / NET: 425 mL        PHYSICAL EXAM:  General: comfortable-appearing, no WOB on trach collar  HEENT: tracheostomy clean  Lungs: no crackles, no wheezes  Heart: regular  Abdomen: soft, no visible tenderness, + BS  Extremities: warm, no edema, not moving to command    LABS:                        10.1   7.41  )-----------( 197      ( 21 Dec 2024 07:53 )             32.3       12-21    137  |  104  |  23  ----------------------------<  111[H]  4.0   |  23  |  1.18    Ca    8.5      21 Dec 2024 07:53  Phos  3.8     12-21  Mg     1.8     12-21            Urinalysis Basic - ( 21 Dec 2024 07:53 )    Color: x / Appearance: x / SG: x / pH: x  Gluc: 111 mg/dL / Ketone: x  / Bili: x / Urobili: x   Blood: x / Protein: x / Nitrite: x   Leuk Esterase: x / RBC: x / WBC x   Sq Epi: x / Non Sq Epi: x / Bacteria: x          RADIOLOGY & ADDITIONAL STUDIES: Reviewed     Subjective :   Overnight Events : Patient is unable to speak , per nursing  he had a fever , and again this morning , on vEEG he is still having Epileptiform activity     MEDICATIONS  (STANDING):  acetylcysteine 10%  Inhalation 4 milliLiter(s) Inhalation every 6 hours  albuterol/ipratropium for Nebulization 3 milliLiter(s) Nebulizer every 6 hours  amLODIPine   Tablet 5 milliGRAM(s) Oral daily  artificial tears (preservative free) Ophthalmic Solution 1 Drop(s) Right EYE every 4 hours  baclofen 5 milliGRAM(s) Oral every 12 hours  cefepime   IVPB 2000 milliGRAM(s) IV Intermittent every 8 hours  chlorhexidine 0.12% Liquid 15 milliLiter(s) Oral Mucosa every 12 hours  chlorhexidine 2% Cloths 1 Application(s) Topical <User Schedule>  doxazosin 8 milliGRAM(s) Oral at bedtime  erythromycin   Ointment 1 Application(s) Right EYE at bedtime  fluticasone propionate 50 MICROgram(s)/spray Nasal Spray 1 Spray(s) Both Nostrils two times a day  fosphenytoin IVPB 100 milliGRAM(s) PE IV Intermittent three times a day  heparin   Injectable 5000 Unit(s) SubCutaneous every 8 hours  levETIRAcetam 1500 milliGRAM(s) Oral two times a day  ofloxacin 0.3% Solution 1 Drop(s) Right EYE four times a day  pantoprazole  Injectable 40 milliGRAM(s) IV Push daily  petrolatum Ophthalmic Ointment 1 Application(s) Both EYES two times a day  propranolol 10 milliGRAM(s) Oral every 8 hours  sodium chloride 0.65% Nasal 1 Spray(s) Both Nostrils two times a day    MEDICATIONS  (PRN):  acetaminophen   Oral Liquid .. 650 milliGRAM(s) Oral every 6 hours PRN Temp greater or equal to 38.5C (101.3F), Mild Pain (1 - 3)  oxyCODONE    Solution 5 milliGRAM(s) Oral every 4 hours PRN Moderate Pain (4 - 6)      Vital Signs Last 24 Hrs  T(C): 38.3 (24 Dec 2024 09:13), Max: 38.3 (24 Dec 2024 09:13)  T(F): 101 (24 Dec 2024 09:13), Max: 101 (24 Dec 2024 09:13)  HR: 86 (24 Dec 2024 12:05) (76 - 104)  BP: 108/72 (24 Dec 2024 12:05) (100/68 - 125/86)  BP(mean): 85 (24 Dec 2024 12:05) (78 - 101)  RR: 19 (24 Dec 2024 12:05) (14 - 19)  SpO2: 97% (24 Dec 2024 12:05) (96% - 100%)    Parameters below as of 24 Dec 2024 12:05  Patient On (Oxygen Delivery Method): ventilator    O2 Concentration (%): 40        PHYSICAL EXAM:  General: comfortable-appearing, no WOB on trach collar  HEENT: tracheostomy clean  Lungs: no crackles, no wheezes  Heart: regular  Abdomen: soft, no visible tenderness, + BS  Extremities: warm, no edema, not moving to command    Labs                         10.1   10.73 )-----------( 212      ( 24 Dec 2024 05:30 )             32.7     12-24    129[L]  |  98  |  35[H]  ----------------------------<  103[H]  4.5   |  22  |  1.21    Ca    8.8      24 Dec 2024 05:30  Phos  3.3     12-24  Mg     1.9     12-24    TPro  6.7  /  Alb  3.1[L]  /  TBili  0.2  /  DBili  x   /  AST  26  /  ALT  50[H]  /  AlkPhos  111  12-24          RADIOLOGY & ADDITIONAL STUDIES: Reviewed

## 2024-12-24 NOTE — PROGRESS NOTE ADULT - SUBJECTIVE AND OBJECTIVE BOX
Neurology Progress Note     GARETT DELEON 44y Male [id number]    Hospital Day: 85d     Overnight events:  None    Subjective complaints:  Pt evaluated at bedside. Pt unable to communicate verbally     Vital Signs  T(F): 101 (09:13), Max: 101 (09:13)  HR: 90 (08:50) (76 - 104)  BP: 125/86 (08:50) (100/68 - 125/86)  RR: 18 (08:50) (14 - 18)  SpO2: 97% (08:50) (96% - 100%)    Neurological Exam:   Mental status: Awake, occasionally briefly tracking interviewer, not responding to yes/no questions even using eyebrow raise and eye closure to indicate response, not following commands  +Blink to threat on L eye only. R eye w/ erythema and ptosis and left eyelid noted to be twitching. + facial weakness noted on R. No withdrawal to painful stimuli in B/L UE and RLE, but there was sensory signs noted w/ facial grimacing. LLE withdraws to pain.      Labs:  WBC 10.73 /HGB 10.1 /MCV 94.5 /HCT 32.7 / / 12-24  WBC 6.78 /HGB 9.6 /MCV 94.9 /HCT 29.7 / / 12-23    12-24    129[L]  |  98  |  35[H]  ----------------------------<  103[H]  4.5   |  22  |  1.21    Ca    8.8      24 Dec 2024 05:30  Phos  3.3     12-24  Mg     1.9     12-24    TPro  6.7  /  Alb  3.1[L]  /  TBili  0.2  /  DBili  x   /  AST  26  /  ALT  50[H]  /  AlkPhos  111  12-24    LIVER FUNCTIONS - ( 24 Dec 2024 05:30 )  Alb: 3.1 g/dL / Pro: 6.7 g/dL / ALK PHOS: 111 U/L / ALT: 50 U/L / AST: 26 U/L / GGT: x             Urinalysis Basic - ( 24 Dec 2024 05:30 )    Color: x / Appearance: x / SG: x / pH: x  Gluc: 103 mg/dL / Ketone: x  / Bili: x / Urobili: x   Blood: x / Protein: x / Nitrite: x   Leuk Esterase: x / RBC: x / WBC x   Sq Epi: x / Non Sq Epi: x / Bacteria: x        Medications:  acetaminophen   Oral Liquid .. 650 milliGRAM(s) Oral every 6 hours PRN  acetylcysteine 10%  Inhalation 4 milliLiter(s) Inhalation every 6 hours  albuterol/ipratropium for Nebulization 3 milliLiter(s) Nebulizer every 6 hours  amLODIPine   Tablet 5 milliGRAM(s) Oral daily  artificial tears (preservative free) Ophthalmic Solution 1 Drop(s) Right EYE every 4 hours  baclofen 5 milliGRAM(s) Oral every 12 hours  cefepime   IVPB 2000 milliGRAM(s) IV Intermittent every 8 hours  chlorhexidine 0.12% Liquid 15 milliLiter(s) Oral Mucosa every 12 hours  chlorhexidine 2% Cloths 1 Application(s) Topical <User Schedule>  doxazosin 8 milliGRAM(s) Oral at bedtime  erythromycin   Ointment 1 Application(s) Right EYE at bedtime  fluticasone propionate 50 MICROgram(s)/spray Nasal Spray 1 Spray(s) Both Nostrils two times a day  fosphenytoin IVPB 100 milliGRAM(s) PE IV Intermittent three times a day  heparin   Injectable 5000 Unit(s) SubCutaneous every 8 hours  levETIRAcetam 1500 milliGRAM(s) Oral two times a day  ofloxacin 0.3% Solution 1 Drop(s) Right EYE four times a day  oxyCODONE    Solution 5 milliGRAM(s) Oral every 4 hours PRN  pantoprazole  Injectable 40 milliGRAM(s) IV Push daily  petrolatum Ophthalmic Ointment 1 Application(s) Both EYES two times a day  propranolol 10 milliGRAM(s) Oral every 8 hours  sodium chloride 0.65% Nasal 1 Spray(s) Both Nostrils two times a day  sodium chloride 3% Bolus 250 milliLiter(s) IV Bolus once      Neuroimaging:      PERTINENT HISTORICAL RESULTS: Neurology Progress Note     GARETT DELEON 44y Male [id number]    Hospital Day: 85d     Overnight events:  None    Subjective complaints:  Pt evaluated at bedside. Pt unable to communicate verbally     Vital Signs  T(F): 101 (09:13), Max: 101 (09:13)  HR: 90 (08:50) (76 - 104)  BP: 125/86 (08:50) (100/68 - 125/86)  RR: 18 (08:50) (14 - 18)  SpO2: 97% (08:50) (96% - 100%)    Neurological Exam:   Mental status: Awake, occasionally briefly tracking interviewer, not responding to yes/no questions even using eyebrow raise and eye closure to indicate response, not following commands  +Blink to threat on L eye only. R eye w/ erythema and ptosis and left eyelid noted to be twitching. + facial weakness noted on R. No withdrawal to painful stimuli in B/L U&LE, but there was sensory signs noted w/ facial grimacing       Labs:  WBC 10.73 /HGB 10.1 /MCV 94.5 /HCT 32.7 / / 12-24  WBC 6.78 /HGB 9.6 /MCV 94.9 /HCT 29.7 / / 12-23    12-24    129[L]  |  98  |  35[H]  ----------------------------<  103[H]  4.5   |  22  |  1.21    Ca    8.8      24 Dec 2024 05:30  Phos  3.3     12-24  Mg     1.9     12-24    TPro  6.7  /  Alb  3.1[L]  /  TBili  0.2  /  DBili  x   /  AST  26  /  ALT  50[H]  /  AlkPhos  111  12-24    LIVER FUNCTIONS - ( 24 Dec 2024 05:30 )  Alb: 3.1 g/dL / Pro: 6.7 g/dL / ALK PHOS: 111 U/L / ALT: 50 U/L / AST: 26 U/L / GGT: x             Urinalysis Basic - ( 24 Dec 2024 05:30 )    Color: x / Appearance: x / SG: x / pH: x  Gluc: 103 mg/dL / Ketone: x  / Bili: x / Urobili: x   Blood: x / Protein: x / Nitrite: x   Leuk Esterase: x / RBC: x / WBC x   Sq Epi: x / Non Sq Epi: x / Bacteria: x        Medications:  acetaminophen   Oral Liquid .. 650 milliGRAM(s) Oral every 6 hours PRN  acetylcysteine 10%  Inhalation 4 milliLiter(s) Inhalation every 6 hours  albuterol/ipratropium for Nebulization 3 milliLiter(s) Nebulizer every 6 hours  amLODIPine   Tablet 5 milliGRAM(s) Oral daily  artificial tears (preservative free) Ophthalmic Solution 1 Drop(s) Right EYE every 4 hours  baclofen 5 milliGRAM(s) Oral every 12 hours  cefepime   IVPB 2000 milliGRAM(s) IV Intermittent every 8 hours  chlorhexidine 0.12% Liquid 15 milliLiter(s) Oral Mucosa every 12 hours  chlorhexidine 2% Cloths 1 Application(s) Topical <User Schedule>  doxazosin 8 milliGRAM(s) Oral at bedtime  erythromycin   Ointment 1 Application(s) Right EYE at bedtime  fluticasone propionate 50 MICROgram(s)/spray Nasal Spray 1 Spray(s) Both Nostrils two times a day  fosphenytoin IVPB 100 milliGRAM(s) PE IV Intermittent three times a day  heparin   Injectable 5000 Unit(s) SubCutaneous every 8 hours  levETIRAcetam 1500 milliGRAM(s) Oral two times a day  ofloxacin 0.3% Solution 1 Drop(s) Right EYE four times a day  oxyCODONE    Solution 5 milliGRAM(s) Oral every 4 hours PRN  pantoprazole  Injectable 40 milliGRAM(s) IV Push daily  petrolatum Ophthalmic Ointment 1 Application(s) Both EYES two times a day  propranolol 10 milliGRAM(s) Oral every 8 hours  sodium chloride 0.65% Nasal 1 Spray(s) Both Nostrils two times a day  sodium chloride 3% Bolus 250 milliLiter(s) IV Bolus once      Neuroimaging:      PERTINENT HISTORICAL RESULTS:

## 2024-12-24 NOTE — PROGRESS NOTE ADULT - ASSESSMENT
44 year-old-male with unclear PMH (?stroke, MI) who was found down at work, intubated for airway protection and found to have acute large parenchymal hemorrhage within nabil with mass effect (+ acute/subacute right cerebellar infarct) in setting of hypertensive emergency, and R cerebellar stroke transferred to [facility] for further neurosurgical care. 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     -  dilantin level (12/24@ 05:30) (12.2)    Recommendations:    - Continue vEEG monitoring  - Continue Keppra 1500mg Q12hrs  - Continue fosphenytoin 100mg Q8hrs  - Maintain seizure/fall/aspiration precautions    ***Recommendations pending final review with Attending Physician***     44 year-old-male with unclear PMH (?stroke, MI) who was found down at work, intubated for airway protection and found to have acute large parenchymal hemorrhage within nabil with mass effect (+ acute/subacute right cerebellar infarct) in setting of hypertensive emergency, and R cerebellar stroke transferred to [facility] for further neurosurgical care. 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 Focal status may be difficult to abort, and given that levels of AEDs required to treat may worsen already limited mental status     -  dilantin level (12/24@ 05:30) (12.2) - corrected level 12.8    Recommendations:    - Can discontinue vEEG  - Continue Keppra 1500mg Q12hrs  - Continue fosphenytoin 100mg Q8hrs  - Maintain seizure/fall/aspiration precautions

## 2024-12-24 NOTE — PROGRESS NOTE ADULT - ASSESSMENT
43 yo M with prolonged hospital stay following acute parenchymal pontine hemorrhage, R cerebellar infarct with poor neurologic recovery s/p trach/PEG s/p multiple antibiotic courses for presumed pneumonia (including Acinetobacter), Klebsiella aerogenes bacteremia (source not identified), respiratory tract colonization by Stenotrophomonas with new fever on 12/22, no leukocytosis, no L shift on 12/23.  He is having new focal seizures, per Neuro, likely status - could be the cause.  However, in view of recent aspiration event on 12/15 treated with antibiotics for 3 d and LLL, partial RLL atelectasis on CTA, would evaluate for pulmonary source, which could be atelectasis. Per RN, he has minimal secretions and has had no need for increased vent support, skin is intact.  His abd is benign, no diarrhea, liver enzymes with very mild ALT elevation (50).  Would check urine.  Can continue antibiotics while w/u is pending.  No evidence for line source - multiple peripheral IVs without phlebitis.  Suggest:  - F/U blood cultures from 12/22 X 2  - Please send tracheal aspirate for culture  - CT chest done - f/u read  - UA with reflex to culture  - Continue cefepime 2 g IV q8h for now  Recommendations discussed with primary team. Dr Lee will cover from tonight through 12/25, I will resume care 12/26, team 2.

## 2024-12-24 NOTE — EEG REPORT - NS EEG TEXT BOX
[facility] Department of Neurology  Inpatient Continuous video-Electroencephalogram  [address], [address] [place] [id number], T: [phone]    Patient Name:	GARETT DELEON    :	1980  MRN:	[id number]    Study Start Date/Time:  2024, 2:03:49 PM  Study End Date/Time: 2024, 12:58 PM    Referred by:  Randy D'Amico, MD    Brief Clinical History:  GARETT DELEON is a 44 year old Male; study performed to investigate for seizures or markers of epilepsy.     Diagnosis Code:  R56.9 convulsions/seizure    Pertinent Medication:  Keppra 1500 mg BID  Fosphenytoin 100 mg TID    Acquisition Details:  Electroencephalography was acquired using a minimum of 21 channels on an "[employer]" Neurology system v 9.3.1 with electrode placement according to the standard International 10-20 system following ACNS (American Clinical Neurophysiology Society) guidelines.  Anterior temporal T1 and T2 electrodes were utilized whenever possible.  The [employer] automated spike & seizure detections were all reviewed in detail, in addition to the entire raw EEG.    Findings:    Day 2:  2024, 7:00 AM to next day 12:58 PM    Background:  continuous, with predominantly low voltage delta activity with overriding beta activity.  Symmetry/Focality: Continuous (90+%) right frontal theta/delta frequency slowing, at times sharply contoured but not definitively epileptiform.    Voltage:  Low (most <20uV LBP Peak-Trough)  Organization:  Appropriate anterior-posterior gradient  Posterior Dominant Rhythm:  No clear PDR obscured by excess beta  Sleep:  Symmetric, synchronous spindles and K complexes.  Variability:   Yes		Reactivity:  Yes    Spontaneous Activity:  No epileptiform discharges     Events:  Frequent rhythmic left facial and eyelid twitching, consistent with focal motor seizures.  Provocations:  •	Hyperventilation: was not performed.  •	Photic stimulation: was not performed.    Daily Summary:    •	Frequent rhythmic left facial and eyelid twitching, consistent with focal motor seizures.  •	Right frontal focal slowing, at times sharply contoured, indicating focal cerebral dysfunction and increased cortical irritability in this region.  •	Diffuse slowing and attenuation, consistent with diffuse or multifocal cerebral dysfunction.      Bessie Martinez MD  CNP Fellow    Kandis Ball MD  Attending Neurologist, [facility] Epilepsy Program

## 2024-12-24 NOTE — PROGRESS NOTE ADULT - ASSESSMENT
44M with unclear PMH (?stroke, MI) who was found down at work, intubated for airway protection and found to have acute parenchymal hemorrhage within nabil with mass effect (+ acute/subacute right cerebellar infarct) in setting of hypertensive emergency, transferred to [facility] for further neurosurgical care. Hospital course c/b poor neurologic recovery s/p trach-PEG, AUR s/p gabriel, ANIKA on CKD c/b hyperkalemia, HAP s/p amp-sulbactam (EOT 10/23), K aerogenes bacteremia  treated with 2 weeks of Cefepime per ID , On 11/15 he became septic and was transferred to NSICU due to increased o2 requirements and needed Vent support , Trach Cultures + for Stenotrophomonas which was treated with 7 days of Bactrim per ID , has been weaned of Vent since 11/23 and is now on 10lts at 40% o2 through Trach Collar. Stepped up to the NSICU on 12/15 for hypoxia and tachycardia. PE ruled out. On abx for 5 days. Unclear etiology. Now stepped down to 8Lach.    # Pontine hemorrhage  # Encephalopathy due to Intracranial Bleed   - Acute parenchymal hemorrhage within nabil with mass effect (+ acute/subacute right cerebellar infarct) in setting of hypertensive emergency.   - MRI brain also demonstrated acute/subacute R cerebellar stroke.   - No Neurosurgical Interventions were performed   - due to IPH and cerebellar stroke patient has become Trach and Peg Dependent    #Seizures  -L facial twitching noted, keppra increased to 1500mg BID   -f/u vEEG  -f/u CT head  -epilepsy recs appreciated    #Fever/tachycardia  - unclear etiology; CXR with improving opacities  - tele w/ sinus tachycardia to the 130's  - f/u labs today  - obtain RVP, Covid, blood cx, u/a, urine cx    # Hypertension  - c/w amlodipine 5mg daily, BPs now better     # Acute kidney injury  # Acute Tubular Necrosis superimposed on CKD. 3  - Pt s/p US renal with chronic medical renal disease  - Elevated BUN , Not on any meds that can elevate BUN , Hgb Stable   - Cr 1.18 within pt baseline range, ctm     # Chronic respiratory failure.   -Pt s/p percutaneous trach by pulm on 10/14/24  - Continue trach collar   - Continue routine trach care and suctioning PRN  - Chest PT    # Neurogenic dysphagia.   -Pt s/p PEG with surgery 10/21/24  - TF per nutrition  - aspiration precautions and elevation of HOB.    #Acute urinary retention.   - doxazosin 8mg    # Functional quadriplegia in  setting of brainstem hemorrhage  - decub precautions  - care per nursing protocol     # DVT prop  - Heparin SQ q8     # Dispo:  8Lach 44M with unclear PMH (?stroke, MI) who was found down at work, intubated for airway protection and found to have acute parenchymal hemorrhage within nabil with mass effect (+ acute/subacute right cerebellar infarct) in setting of hypertensive emergency, transferred to [facility] for further neurosurgical care. Hospital course c/b poor neurologic recovery s/p trach-PEG, AUR s/p gabriel, ANIKA on CKD c/b hyperkalemia, HAP s/p amp-sulbactam (EOT 10/23), K aerogenes bacteremia  treated with 2 weeks of Cefepime per ID , On 11/15 he became septic and was transferred to NSICU due to increased o2 requirements and needed Vent support , Trach Cultures + for Stenotrophomonas which was treated with 7 days of Bactrim per ID , has been weaned of Vent since 11/23 and is now on 10lts at 40% o2 through Trach Collar. Stepped up to the NSICU on 12/15 for hypoxia and tachycardia. PE ruled out. On abx for 5 days. Unclear etiology. Now stepped down to 8Lach.    # Pontine hemorrhage  # Encephalopathy due to Intracranial Bleed   - Acute parenchymal hemorrhage within nabil with mass effect (+ acute/subacute right cerebellar infarct) in setting of hypertensive emergency.   - MRI brain also demonstrated acute/subacute R cerebellar stroke.   - No Neurosurgical Interventions were performed   - due to IPH and cerebellar stroke patient has become Trach and Peg Dependent    # Goals of Care   - Given recurrent infections and now with Refractory Seizures , Vent Dependence palliative care was reconsulted and will re engage with family and Court appointed guardian     # Hyponatremia   - ? SIADH from Seizures Vs Fosphenytoin Related , was given Hypertonic Saline Bolus this morning , will f/u Na     #Seizures  -f/u vEEG  -f/u CT head  -epilepsy recs appreciated  - on Keppra and Fosphenytoin     # Fever/tachycardia  - unclear etiology; CXR with improving opacities  - CT Chest   - Per ID continue Cefepime , Monitor blood cx and trach cultures   - tele w/ sinus tachycardia to the 130's  - f/u labs today    # Hypertension  - c/w amlodipine 5mg daily, BPs now better     # Acute kidney injury  # Acute Tubular Necrosis superimposed on CKD. 3  - Pt s/p US renal with chronic medical renal disease  - Elevated BUN , Not on any meds that can elevate BUN , Hgb Stable   - Cr 1.18 within pt baseline range, ctm     # Chronic respiratory failure.   -Pt s/p percutaneous trach by pulm on 10/14/24  - Continue trach collar   - Continue routine trach care and suctioning PRN  - Chest PT    # Neurogenic dysphagia.   -Pt s/p PEG with surgery 10/21/24  - TF per nutrition  - aspiration precautions and elevation of HOB.    #Acute urinary retention.   - doxazosin 8mg    # Functional quadriplegia in  setting of brainstem hemorrhage  - decub precautions  - care per nursing protocol     # DVT prop  - Heparin SQ q8     # Dispo:   Court Appointed Guardigeorge Pierson , Jason Aguayo

## 2024-12-25 LAB
ANION GAP SERPL CALC-SCNC: 11 MMOL/L — SIGNIFICANT CHANGE UP (ref 5–17)
APPEARANCE UR: CLEAR — SIGNIFICANT CHANGE UP
BILIRUB UR-MCNC: NEGATIVE — SIGNIFICANT CHANGE UP
BUN SERPL-MCNC: 36 MG/DL — HIGH (ref 7–23)
CALCIUM SERPL-MCNC: 8.8 MG/DL — SIGNIFICANT CHANGE UP (ref 8.4–10.5)
CHLORIDE SERPL-SCNC: 102 MMOL/L — SIGNIFICANT CHANGE UP (ref 96–108)
CO2 SERPL-SCNC: 23 MMOL/L — SIGNIFICANT CHANGE UP (ref 22–31)
COLOR SPEC: YELLOW — SIGNIFICANT CHANGE UP
CREAT SERPL-MCNC: 1.28 MG/DL — SIGNIFICANT CHANGE UP (ref 0.5–1.3)
DIFF PNL FLD: ABNORMAL
EGFR: 70 ML/MIN/1.73M2 — SIGNIFICANT CHANGE UP
EGFR: 70 ML/MIN/1.73M2 — SIGNIFICANT CHANGE UP
GLUCOSE SERPL-MCNC: 113 MG/DL — HIGH (ref 70–99)
GLUCOSE UR QL: NEGATIVE MG/DL — SIGNIFICANT CHANGE UP
HCT VFR BLD CALC: 32.1 % — LOW (ref 39–50)
HGB BLD-MCNC: 10.1 G/DL — LOW (ref 13–17)
KETONES UR-MCNC: NEGATIVE MG/DL — SIGNIFICANT CHANGE UP
LEUKOCYTE ESTERASE UR-ACNC: NEGATIVE — SIGNIFICANT CHANGE UP
MAGNESIUM SERPL-MCNC: 2 MG/DL — SIGNIFICANT CHANGE UP (ref 1.6–2.6)
MCHC RBC-ENTMCNC: 29.9 PG — SIGNIFICANT CHANGE UP (ref 27–34)
MCHC RBC-ENTMCNC: 31.5 G/DL — LOW (ref 32–36)
MCV RBC AUTO: 95 FL — SIGNIFICANT CHANGE UP (ref 80–100)
NITRITE UR-MCNC: NEGATIVE — SIGNIFICANT CHANGE UP
NRBC # BLD: 0 /100 WBCS — SIGNIFICANT CHANGE UP (ref 0–0)
NRBC BLD-RTO: 0 /100 WBCS — SIGNIFICANT CHANGE UP (ref 0–0)
PH UR: 5.5 — SIGNIFICANT CHANGE UP (ref 5–8)
PHOSPHATE SERPL-MCNC: 3.8 MG/DL — SIGNIFICANT CHANGE UP (ref 2.5–4.5)
PLATELET # BLD AUTO: 215 K/UL — SIGNIFICANT CHANGE UP (ref 150–400)
POTASSIUM SERPL-MCNC: 3.8 MMOL/L — SIGNIFICANT CHANGE UP (ref 3.5–5.3)
POTASSIUM SERPL-SCNC: 3.8 MMOL/L — SIGNIFICANT CHANGE UP (ref 3.5–5.3)
PROT UR-MCNC: 100 MG/DL
RBC # BLD: 3.38 M/UL — LOW (ref 4.2–5.8)
RBC # FLD: 14.6 % — HIGH (ref 10.3–14.5)
SODIUM SERPL-SCNC: 136 MMOL/L — SIGNIFICANT CHANGE UP (ref 135–145)
SP GR SPEC: 1.02 — SIGNIFICANT CHANGE UP (ref 1–1.03)
UROBILINOGEN FLD QL: 0.2 MG/DL — SIGNIFICANT CHANGE UP (ref 0.2–1)
WBC # BLD: 8.11 K/UL — SIGNIFICANT CHANGE UP (ref 3.8–10.5)
WBC # FLD AUTO: 8.11 K/UL — SIGNIFICANT CHANGE UP (ref 3.8–10.5)

## 2024-12-25 PROCEDURE — 99233 SBSQ HOSP IP/OBS HIGH 50: CPT

## 2024-12-25 PROCEDURE — 99232 SBSQ HOSP IP/OBS MODERATE 35: CPT

## 2024-12-25 PROCEDURE — 99231 SBSQ HOSP IP/OBS SF/LOW 25: CPT

## 2024-12-25 RX ADMIN — Medication 1 DROP(S): at 21:38

## 2024-12-25 RX ADMIN — FOSPHENYTOIN SODIUM 104 MILLIGRAM(S) PE: 50 INJECTION INTRAMUSCULAR; INTRAVENOUS at 21:38

## 2024-12-25 RX ADMIN — FLUTICASONE PROPIONATE 1 SPRAY(S): 50 SPRAY, METERED NASAL at 05:39

## 2024-12-25 RX ADMIN — OFLOXACIN 1 DROP(S): 3 SOLUTION OPHTHALMIC at 23:39

## 2024-12-25 RX ADMIN — BACLOFEN 5 MILLIGRAM(S): 10 INJECTION INTRATHECAL at 17:24

## 2024-12-25 RX ADMIN — FOSPHENYTOIN SODIUM 104 MILLIGRAM(S) PE: 50 INJECTION INTRAMUSCULAR; INTRAVENOUS at 13:57

## 2024-12-25 RX ADMIN — ACETYLCYSTEINE 4 MILLILITER(S): 200 INHALANT RESPIRATORY (INHALATION) at 03:01

## 2024-12-25 RX ADMIN — Medication 1 DROP(S): at 01:39

## 2024-12-25 RX ADMIN — Medication 1 DROP(S): at 17:25

## 2024-12-25 RX ADMIN — DOXAZOSIN MESYLATE 8 MILLIGRAM(S): 8 TABLET ORAL at 21:36

## 2024-12-25 RX ADMIN — Medication 650 MILLIGRAM(S): at 05:07

## 2024-12-25 RX ADMIN — Medication 15 MILLILITER(S): at 05:26

## 2024-12-25 RX ADMIN — IPRATROPIUM BROMIDE AND ALBUTEROL SULFATE 3 MILLILITER(S): .5; 2.5 SOLUTION RESPIRATORY (INHALATION) at 03:02

## 2024-12-25 RX ADMIN — Medication 40 MILLIGRAM(S): at 11:25

## 2024-12-25 RX ADMIN — IPRATROPIUM BROMIDE AND ALBUTEROL SULFATE 3 MILLILITER(S): .5; 2.5 SOLUTION RESPIRATORY (INHALATION) at 09:38

## 2024-12-25 RX ADMIN — AMLODIPINE BESYLATE 5 MILLIGRAM(S): 10 TABLET ORAL at 05:25

## 2024-12-25 RX ADMIN — HEPARIN SODIUM 5000 UNIT(S): 1000 INJECTION INTRAVENOUS; SUBCUTANEOUS at 05:26

## 2024-12-25 RX ADMIN — HEPARIN SODIUM 5000 UNIT(S): 1000 INJECTION INTRAVENOUS; SUBCUTANEOUS at 13:58

## 2024-12-25 RX ADMIN — HEPARIN SODIUM 5000 UNIT(S): 1000 INJECTION INTRAVENOUS; SUBCUTANEOUS at 21:37

## 2024-12-25 RX ADMIN — Medication 1 APPLICATION(S): at 06:30

## 2024-12-25 RX ADMIN — ACETYLCYSTEINE 4 MILLILITER(S): 200 INHALANT RESPIRATORY (INHALATION) at 09:40

## 2024-12-25 RX ADMIN — OFLOXACIN 1 DROP(S): 3 SOLUTION OPHTHALMIC at 17:25

## 2024-12-25 RX ADMIN — Medication 15 MILLILITER(S): at 17:24

## 2024-12-25 RX ADMIN — ACETYLCYSTEINE 4 MILLILITER(S): 200 INHALANT RESPIRATORY (INHALATION) at 21:35

## 2024-12-25 RX ADMIN — Medication 1 SPRAY(S): at 05:39

## 2024-12-25 RX ADMIN — Medication 1 APPLICATION(S): at 17:26

## 2024-12-25 RX ADMIN — FOSPHENYTOIN SODIUM 104 MILLIGRAM(S) PE: 50 INJECTION INTRAMUSCULAR; INTRAVENOUS at 05:25

## 2024-12-25 RX ADMIN — IPRATROPIUM BROMIDE AND ALBUTEROL SULFATE 3 MILLILITER(S): .5; 2.5 SOLUTION RESPIRATORY (INHALATION) at 15:18

## 2024-12-25 RX ADMIN — CEFEPIME 100 MILLIGRAM(S): 2 INJECTION, POWDER, FOR SOLUTION INTRAVENOUS at 05:25

## 2024-12-25 RX ADMIN — LEVETIRACETAM 1500 MILLIGRAM(S): 10 INJECTION, SOLUTION INTRAVENOUS at 05:26

## 2024-12-25 RX ADMIN — LEVETIRACETAM 1500 MILLIGRAM(S): 10 INJECTION, SOLUTION INTRAVENOUS at 17:24

## 2024-12-25 RX ADMIN — Medication 1 DROP(S): at 13:56

## 2024-12-25 RX ADMIN — Medication 1 DROP(S): at 05:26

## 2024-12-25 RX ADMIN — CEFEPIME 100 MILLIGRAM(S): 2 INJECTION, POWDER, FOR SOLUTION INTRAVENOUS at 13:57

## 2024-12-25 RX ADMIN — BACLOFEN 5 MILLIGRAM(S): 10 INJECTION INTRATHECAL at 05:25

## 2024-12-25 RX ADMIN — ACETYLCYSTEINE 4 MILLILITER(S): 200 INHALANT RESPIRATORY (INHALATION) at 15:18

## 2024-12-25 RX ADMIN — Medication 20 MILLIEQUIVALENT(S): at 07:50

## 2024-12-25 RX ADMIN — FLUTICASONE PROPIONATE 1 SPRAY(S): 50 SPRAY, METERED NASAL at 17:26

## 2024-12-25 RX ADMIN — OFLOXACIN 1 DROP(S): 3 SOLUTION OPHTHALMIC at 05:39

## 2024-12-25 RX ADMIN — Medication 1 SPRAY(S): at 17:25

## 2024-12-25 RX ADMIN — CEFEPIME 100 MILLIGRAM(S): 2 INJECTION, POWDER, FOR SOLUTION INTRAVENOUS at 21:34

## 2024-12-25 RX ADMIN — ERYTHROMYCIN 1 APPLICATION(S): 5 OINTMENT OPHTHALMIC at 21:36

## 2024-12-25 RX ADMIN — Medication 1 APPLICATION(S): at 06:31

## 2024-12-25 RX ADMIN — IPRATROPIUM BROMIDE AND ALBUTEROL SULFATE 3 MILLILITER(S): .5; 2.5 SOLUTION RESPIRATORY (INHALATION) at 21:36

## 2024-12-25 RX ADMIN — OFLOXACIN 1 DROP(S): 3 SOLUTION OPHTHALMIC at 11:25

## 2024-12-25 RX ADMIN — Medication 1 DROP(S): at 09:41

## 2024-12-25 NOTE — PROGRESS NOTE ADULT - ASSESSMENT
44M with unclear PMH (?stroke, MI) who was found down at work, intubated for airway protection and found to have acute parenchymal hemorrhage within nabil with mass effect (+ acute/subacute right cerebellar infarct) in setting of hypertensive emergency, transferred to [facility] for further neurosurgical care. Hospital course c/b poor neurologic recovery s/p trach-PEG, AUR s/p gabriel, ANIKA on CKD c/b hyperkalemia, HAP s/p amp-sulbactam (EOT 10/23), K aerogenes bacteremia  treated with 2 weeks of Cefepime per ID , On 11/15 he became septic and was transferred to NSICU due to increased o2 requirements and needed Vent support , Trach Cultures + for Stenotrophomonas which was treated with 7 days of Bactrim per ID , has been weaned of Vent since 11/23 and is now on 10lts at 40% o2 through Trach Collar. Stepped up to the NSICU on 12/15 for hypoxia and tachycardia. PE ruled out. On abx for 5 days. Unclear etiology. Now stepped down to 8Lach.    # Pontine hemorrhage  # Encephalopathy due to Intracranial Bleed   - Acute parenchymal hemorrhage within nabil with mass effect (+ acute/subacute right cerebellar infarct) in setting of hypertensive emergency.   - MRI brain also demonstrated acute/subacute R cerebellar stroke.   - No Neurosurgical Interventions were performed   - due to IPH and cerebellar stroke patient has become Trach and Peg Dependent    # Goals of Care   - Given recurrent infections and now with Refractory Seizures , Vent Dependence palliative care was reconsulted and will re engage with family and Court appointed guardian     # Hyponatremia RESOLVED  - ? SIADH from Seizures Vs Fosphenytoin Related.    #Seizures  -f/u vEEG  -f/u CT head  -epilepsy recs appreciated  - on Keppra and Fosphenytoin     # Fever/tachycardia  - unclear etiology; CXR with improving opacities  - CT Chest done. Read pending  - Per ID continue Cefepime , Monitor blood cx and trach cultures   - tele w/ sinus tachycardia to the 130's  - f/u labs today    # Hypertension  - c/w amlodipine 5mg daily, BPs now better     # Acute kidney injury  # Acute Tubular Necrosis superimposed on CKD. 3  - Pt s/p US renal with chronic medical renal disease  - Elevated BUN , Not on any meds that can elevate BUN , Hgb Stable   - Cr 1.18 within pt baseline range, ctm     # Chronic respiratory failure.   -Pt s/p percutaneous trach by pulm on 10/14/24  - Continue trach collar   - Continue routine trach care and suctioning PRN  - Chest PT    # Neurogenic dysphagia.   -Pt s/p PEG with surgery 10/21/24  - TF per nutrition  - aspiration precautions and elevation of HOB.    #Acute urinary retention.   - doxazosin 8mg    # Functional quadriplegia in  setting of brainstem hemorrhage  - decub precautions  - care per nursing protocol     # DVT prop  - Heparin SQ q8     # Dispo:   Court Appointed Guardian Kenna , Son Olivier

## 2024-12-25 NOTE — PROGRESS NOTE ADULT - ASSESSMENT
44M with prolonged hospital stay following acute parenchymal pontine hemorrhage, R cerebellar infarct with poor neurologic recovery s/p trach/PEG and s/p treatment for presumed PNA (ACB), K.aerogenes bacteremia, respiratory colonization with Steno, recent 3 day course of abx for aspiration on 12/15, with new fever on 12/22. CT chest with extensive BLL patchy opacities - possible recurrent PNA.    Recommendations:  - Send tracheal aspirate for culture  - Send UA with reflex to culture  - Follow pending blood cultures  - Continue cefepime 2 g IV q8h for now    ID Team 2 will follow. I will cover today and Dr. Velasco will resume care 12/26.

## 2024-12-25 NOTE — PROGRESS NOTE ADULT - ASSESSMENT
44 year-old-male with unclear PMH (?stroke, MI) who was found down at work, intubated for airway protection and found to have acute large parenchymal hemorrhage within nabil with mass effect (+ acute/subacute right cerebellar infarct) in setting of hypertensive emergency, and R cerebellar stroke transferred to [facility] for further neurosurgical care. 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 Focal status may be difficult to abort, and given that levels of AEDs required to treat may worsen already limited mental status     - phenytoin level (12/24@ 05:30) (12.2) - corrected level 12.8  - vEEG 12/24 Frequent rhythmic left facial and eyelid twitching, consistent with focal motor seizures. Right frontal focal slowing, at times sharply contoured, indicating focal cerebral dysfunction and increased cortical irritability in this region. Diffuse slowing and attenuation, consistent with diffuse or multifocal cerebral dysfunction.    Recommendations:    - Continue Keppra 1500mg Q12hrs  - Continue fosphenytoin 100mg Q8hrs  - Continue to monitor off vEEG  - Maintain seizure/fall/aspiration precautions      Recommendations not finalized until attending attestation.   44 year-old-male with unclear PMH (?stroke, MI) who was found down at work, intubated for airway protection and found to have acute large parenchymal hemorrhage within nabil with mass effect (+ acute/subacute right cerebellar infarct) in setting of hypertensive emergency, and R cerebellar stroke transferred to [facility] for further neurosurgical care. 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 Focal status may be difficult to abort, and given that levels of AEDs required to treat may worsen already limited mental status     - phenytoin level (12/24@ 05:30) (12.2) - corrected level 12.8  - vEEG 12/24 Frequent rhythmic left facial and eyelid twitching, consistent with focal motor seizures. Right frontal focal slowing, at times sharply contoured, indicating focal cerebral dysfunction and increased cortical irritability in this region. Diffuse slowing and attenuation, consistent with diffuse or multifocal cerebral dysfunction.    Recommendations:    - Continue Keppra 1500mg Q12hrs  - Continue fosphenytoin 100mg Q8hrs  - Continue to monitor off vEEG  - Maintain seizure/fall/aspiration precautions

## 2024-12-25 NOTE — PROGRESS NOTE ADULT - SUBJECTIVE AND OBJECTIVE BOX
Patient is a 44y old  Male who presents with a chief complaint of brain stem bleed (25 Dec 2024 00:18)      SUBJECTIVE / OVERNIGHT EVENTS:  On empiric antibiotics.   Afebrile overnight.  Awaiting CT chest read.    MEDICATIONS  (STANDING):  acetylcysteine 10%  Inhalation 4 milliLiter(s) Inhalation every 6 hours  albuterol/ipratropium for Nebulization 3 milliLiter(s) Nebulizer every 6 hours  amLODIPine   Tablet 5 milliGRAM(s) Oral daily  artificial tears (preservative free) Ophthalmic Solution 1 Drop(s) Right EYE every 4 hours  baclofen 5 milliGRAM(s) Oral every 12 hours  cefepime   IVPB 2000 milliGRAM(s) IV Intermittent every 8 hours  chlorhexidine 0.12% Liquid 15 milliLiter(s) Oral Mucosa every 12 hours  chlorhexidine 2% Cloths 1 Application(s) Topical <User Schedule>  doxazosin 8 milliGRAM(s) Oral at bedtime  erythromycin   Ointment 1 Application(s) Right EYE at bedtime  fluticasone propionate 50 MICROgram(s)/spray Nasal Spray 1 Spray(s) Both Nostrils two times a day  fosphenytoin IVPB 100 milliGRAM(s) PE IV Intermittent three times a day  heparin   Injectable 5000 Unit(s) SubCutaneous every 8 hours  levETIRAcetam 1500 milliGRAM(s) Oral two times a day  ofloxacin 0.3% Solution 1 Drop(s) Right EYE four times a day  pantoprazole  Injectable 40 milliGRAM(s) IV Push daily  petrolatum Ophthalmic Ointment 1 Application(s) Both EYES two times a day  propranolol 10 milliGRAM(s) Oral every 8 hours  sodium chloride 0.65% Nasal 1 Spray(s) Both Nostrils two times a day    MEDICATIONS  (PRN):  acetaminophen   Oral Liquid .. 650 milliGRAM(s) Oral every 6 hours PRN Temp greater or equal to 38.5C (101.3F), Mild Pain (1 - 3)  oxyCODONE    Solution 5 milliGRAM(s) Oral every 4 hours PRN Moderate Pain (4 - 6)      CAPILLARY BLOOD GLUCOSE        I&O's Summary    24 Dec 2024 07:01  -  25 Dec 2024 07:00  --------------------------------------------------------  IN: 1330 mL / OUT: 1950 mL / NET: -620 mL    25 Dec 2024 07:01  -  25 Dec 2024 10:48  --------------------------------------------------------  IN: 0 mL / OUT: 200 mL / NET: -200 mL        PHYSICAL EXAM:  Vital Signs Last 24 Hrs  T(C): 37.2 (25 Dec 2024 09:00), Max: 37.6 (24 Dec 2024 14:09)  T(F): 98.9 (25 Dec 2024 09:00), Max: 99.6 (24 Dec 2024 14:09)  HR: 80 (25 Dec 2024 08:00) (76 - 88)  BP: 113/74 (25 Dec 2024 08:00) (108/72 - 113/74)  BP(mean): 89 (25 Dec 2024 08:00) (85 - 89)  RR: 16 (25 Dec 2024 08:00) (14 - 19)  SpO2: 98% (25 Dec 2024 08:00) (97% - 100%)    Parameters below as of 25 Dec 2024 08:00  Patient On (Oxygen Delivery Method): ventilator    O2 Concentration (%): 40  General: comfortable-appearing, no WOB on trach collar  HEENT: tracheostomy clean  Lungs: no crackles, no wheezes  Heart: regular  Abdomen: soft, no visible tenderness, + BS  Extremities: warm, no edema, not moving to command  LABS:                        10.1   8.11  )-----------( 215      ( 25 Dec 2024 05:30 )             32.1     12-25    136  |  102  |  36[H]  ----------------------------<  113[H]  3.8   |  23  |  1.28    Ca    8.8      25 Dec 2024 05:30  Phos  3.8     12-25  Mg     2.0     12-25    TPro  6.7  /  Alb  3.1[L]  /  TBili  0.2  /  DBili  x   /  AST  26  /  ALT  50[H]  /  AlkPhos  111  12-24          Urinalysis Basic - ( 25 Dec 2024 05:30 )    Color: x / Appearance: x / SG: x / pH: x  Gluc: 113 mg/dL / Ketone: x  / Bili: x / Urobili: x   Blood: x / Protein: x / Nitrite: x   Leuk Esterase: x / RBC: x / WBC x   Sq Epi: x / Non Sq Epi: x / Bacteria: x        RADIOLOGY & ADDITIONAL TESTS:    Imaging Personally Reviewed:    Consultant(s) Notes Reviewed:      Care Discussed with Consultants/Other Providers:

## 2024-12-25 NOTE — PROGRESS NOTE ADULT - SUBJECTIVE AND OBJECTIVE BOX
OVERNIGHT EVENTS: NAEON    SUBJECTIVE / INTERVAL HPI: Patient seen and examined at bedside. Unable to complete ROS due to mental status- AAOx0.     VITAL SIGNS:  Vital Signs Last 24 Hrs  T(C): 37.2 (25 Dec 2024 09:00), Max: 37.6 (24 Dec 2024 14:09)  T(F): 98.9 (25 Dec 2024 09:00), Max: 99.6 (24 Dec 2024 14:09)  HR: 80 (25 Dec 2024 08:00) (76 - 88)  BP: 113/74 (25 Dec 2024 08:00) (108/72 - 113/74)  BP(mean): 89 (25 Dec 2024 08:00) (85 - 89)  RR: 16 (25 Dec 2024 08:00) (14 - 19)  SpO2: 98% (25 Dec 2024 08:00) (97% - 100%)    Parameters below as of 25 Dec 2024 08:00  Patient On (Oxygen Delivery Method): ventilator    O2 Concentration (%): 40    Neurological Exam:   Mental status: Awake, not tracking interviewer, not responding to yes/no questions even using eyebrow raise and eye closure to indicate response, not following commands  +Blink to threat on L eye only. R eye w/ ptosis and left eyelid noted to be twitching. No withdrawal to painful stimuli in B/L U&LE, but there was sensory signs noted w/ facial grimacing       MEDICATIONS:  MEDICATIONS  (STANDING):  acetylcysteine 10%  Inhalation 4 milliLiter(s) Inhalation every 6 hours  albuterol/ipratropium for Nebulization 3 milliLiter(s) Nebulizer every 6 hours  amLODIPine   Tablet 5 milliGRAM(s) Oral daily  artificial tears (preservative free) Ophthalmic Solution 1 Drop(s) Right EYE every 4 hours  baclofen 5 milliGRAM(s) Oral every 12 hours  cefepime   IVPB 2000 milliGRAM(s) IV Intermittent every 8 hours  chlorhexidine 0.12% Liquid 15 milliLiter(s) Oral Mucosa every 12 hours  chlorhexidine 2% Cloths 1 Application(s) Topical <User Schedule>  doxazosin 8 milliGRAM(s) Oral at bedtime  erythromycin   Ointment 1 Application(s) Right EYE at bedtime  fluticasone propionate 50 MICROgram(s)/spray Nasal Spray 1 Spray(s) Both Nostrils two times a day  fosphenytoin IVPB 100 milliGRAM(s) PE IV Intermittent three times a day  heparin   Injectable 5000 Unit(s) SubCutaneous every 8 hours  levETIRAcetam 1500 milliGRAM(s) Oral two times a day  ofloxacin 0.3% Solution 1 Drop(s) Right EYE four times a day  pantoprazole  Injectable 40 milliGRAM(s) IV Push daily  petrolatum Ophthalmic Ointment 1 Application(s) Both EYES two times a day  propranolol 10 milliGRAM(s) Oral every 8 hours  sodium chloride 0.65% Nasal 1 Spray(s) Both Nostrils two times a day    MEDICATIONS  (PRN):  acetaminophen   Oral Liquid .. 650 milliGRAM(s) Oral every 6 hours PRN Temp greater or equal to 38.5C (101.3F), Mild Pain (1 - 3)  oxyCODONE    Solution 5 milliGRAM(s) Oral every 4 hours PRN Moderate Pain (4 - 6)      ALLERGIES:  Allergies    Allergy Status Unknown    Intolerances        LABS:                        10.1   8.11  )-----------( 215      ( 25 Dec 2024 05:30 )             32.1     12-25    136  |  102  |  36[H]  ----------------------------<  113[H]  3.8   |  23  |  1.28    Ca    8.8      25 Dec 2024 05:30  Phos  3.8     12-25  Mg     2.0     12-25    TPro  6.7  /  Alb  3.1[L]  /  TBili  0.2  /  DBili  x   /  AST  26  /  ALT  50[H]  /  AlkPhos  111  12-24      Urinalysis Basic - ( 25 Dec 2024 05:30 )    Color: x / Appearance: x / SG: x / pH: x  Gluc: 113 mg/dL / Ketone: x  / Bili: x / Urobili: x   Blood: x / Protein: x / Nitrite: x   Leuk Esterase: x / RBC: x / WBC x   Sq Epi: x / Non Sq Epi: x / Bacteria: x      CAPILLARY BLOOD GLUCOSE      POCT Blood Glucose.: 121 mg/dL (23 Dec 2024 11:17)      RADIOLOGY & ADDITIONAL TESTS: Reviewed.

## 2024-12-25 NOTE — PROGRESS NOTE ADULT - SUBJECTIVE AND OBJECTIVE BOX
HPI:  Unknown age male, unknown past medical history (reported stroke and MI by coworkers) presented to [facility] with AMS, Pt was working at [employer] when he was found down by coworkers. EMS called and pt brought to [facility] ED. Intubated, sedated, started on cardene for SBPs in 200s. CT head showed brain stem bleed. Transferred to NSICU for further management.  (30 Sep 2024 12:55)    OVERNIGHT EVENTS: GEORGE    Hospital Course:   9/30: transferred from [facility]. A line placed. Versed dc'd. Cy Rader at bedside, states pt has family in [place], cannot confirm medications or PMH other than stroke and MI. 250cc bolus 3% given. LR switched to NS. hydralazine 25q8 started, 3% started, switched propofol to precedex   10/1: stability CTH done. Added labetalol, started TF. Palliative consulted. ethics consulted to determine surrogate. febrile 103, pan cx sent  10/2: BD 2, GEORGE overnight. TF resumed. Desatt'd to 80s, FiO2 inc. to 50. Fentanyl given, ABG, CXR ordered. Maxxed on precedex, started on propofol for DARIEN -4 - -5. Precedex dc'd. Duonebs, mucomyst, hypertonic added. 3% dc'd. Cardene dc'd. Start vanc/CTX. Increased labetalol 200q8. MRSA negative, dc'd vanc. ETT pulled back 2cm x 2, good positioning after confirmatory chest xray. Ethics attempting to establish HCP with family. Na 159, starting FW 250q6 for range 150-155.   10/3: BD3, GEORGE o/n, neuro stable. Na elevating, FW increased to 300q6. Dc'd bowel reg for diarrhea. vEEG started. SQH 5000q8 tonight.   10/4: BD 4, albumn bolus, incr. LR to 80 2/2 incr. in Cr, LR to 10 0cc/hr for uptrending Cr. Started 7% hypersal for 48hrs and SL atropine for inline/oral thick secretions. Dc'd CTX and started ancef for MSSA in the sputum. Nephrology consulted for CKD, f/u recs. SBP 170s, given hydralazine 10mg IVP.   10/5: BD5, o/n 10mg IVP hydralazine given for SBP 170s and started on hydralazine 25q8 via OGT. 10mg IV push labetalol for SBP > 160s. RT placed for diarrhea.   10/6: BD6, o/n FW increased to 350q4 per nephrology recs. IV tylenol for temp 100.6, SBp 160s presumed uncomfortable.   10/7: BD7, overnight pancultured for temp 101.8F.   10/8: BD8. GEORGE. Cr bumped. decreased LR to 75cc/hr. Adding simethicone ATC. incr hydralazine 50mgTID. Incr labetalol 300mgTID. Na 145, decreased FWF to 250q6. Start precedex. FENa consistent with intrinsic kidney injury. Pend repeat renal US. Retaining up to 1.3L, bladder scans q6, straight cath PRN  10/9: BD 9. GEORGE overnight. Neuro stable. abd xray for distention w non-specific gas pattern, OGT to LIWS for morning. duonebs/mucomyst to q8 for improving secretions. Changed tube feeds to Jevity 1.5 20cc/hr, low rate due to abdominal distention, nepro dense and more difficult to digest. Tolerating CPAP, confirmed by ABG.   10/10: BD 10. GEORGE overnight. Neuro stable. (+) gabriel for urinary retention on bladder scan. inc TF to goal rate of 40cc/hr. family leaning toward pursuing trach/PEG. 1/2 amp for FS 81.   10/11: BD 11. GEORGE overnight. Neuro stable. Trach/PEG consults placed.   10/12: BD 12. GEORGE overnight. Neuro stable. MRI brain complete.   10/13: BD 13. Increase flomax. Hold SQH after PM dose for trach tm. IVL.   10/14: BD 14. GEORGE overnight, remains on AC/VC. Gabriel placed for urinary retention. Dc'd free water.  S/p trach with pulm. NGT placed and CXR confirmed in good position.   10/15: BD 15, GEORGE ovn. resumed feeds. spiked 101, pan cx sent.   10/16: BD 16. GEORGE ovn. Lokelma 5mg for K+ 5.4. Started vanc q 24/zosyn for empiric PNA coverage, IVF to 100/hr. PEG held for fever.   10/17: BD 17,  ordered serum osm and urine osm for am. Started sinemet for neurostimulation. Increased cardura to 0.8. Started FW 100q4, dc'd IVF. MRSA negative, dc'd vanc. NGT replaced d/t coiling.   10/18: BD 18, GEORGE overnight, neuro stable. Amantadine added for neurostim. zosyn changed to unasyn for acinetobacter baumannii, failed TOV and required SC  10/19: BD 19, GEORGE ovn. cardura 2mg added for retention. labetalol decreased 200q8, hydralazine decreased 25q8. Gabriel replaced.   10/20: BD20, GEORGE overnight. NGT dislodged, replaced. PEG tomorrow w/ gen surg, FW increased to 150q4 and labetalol decreased to 100q8, lokelma given for hyperkalemia.   10/21: BD 21. POD0 PEG placement with Gen surg. decr labetolol to 50q8, incr. cardura to 0.4, started lokelma and phoslo, dc gabriel POD0 PEG placement with Gen surg.  10/22: BD 22. Plan to start TF today via PEG. dc labetalol, Following ophtho recs. Increased apnea settings - found to be in cheyne-moe respiration. CPAP 5/5.  10/23: BD 23. hydralazine d/c'd, trach collar trial today. Rectal tube placed at 6am.  10/24: BD24, o/n lokelma held due to diarrhea. Free water 100q6 resumed. dc'd tamsulosin, amantadine. Incr'd cardura to 8mg qhs. Dc'd FW. Switched jevity to nepro. gabriel placed for high urine output. Started SL atropine for oral secretions. Dc'd free water.  10/25: BD25, o/n decreased suctioning requirements to > q4hrs, GEORGE. Cr improving, cont phoslo, lokelma held at this time. Gabriel placed yest, cont. Tolerating trach collar. Given 500cc plasmalyte bolus for ANIKA. Dc'd sinemet.   10/26: BD26, o/n resumed lokelma 5mg daily and resumed 100cc free water q6hrs. Change in neuro status with new right pupillary dilation with anisocoria (right pupil 6mm fixed and left pupil 3mm briskly reactive). Given 23.4% NaCl bullet, taken for emergent CTH showing mostly resolved pontine hemorrhage, continued brainstem hypodensity likely edema d/t hemorrhage, no new hemorrhage or infarct, no herniation, mild increase in size of left lateral ventricle. Vitals remaine stable. Na goal > 140.   10/27: BD27, o/n GEORGE.Neuro stable. Pend stepdown with airway bed.   10/28: BD 28. GEORGE overnight. Neuro stable. Miralax ordered. Gabriel removed, pending TOV.  10/29: BD 29. GEORGE o/n. Given 2L NS over 8 hrs for increased BUN/Cr ratio. Gabriel placed for frequent straight cath.   10/30: BD 30.   10/31: BD 31. GEORGE overnight. Na 149, increased free water to 200q6. 1L NS for uptrending BUN.   11/1: BD 32. GEORGE overnight. Given 1L NS for dehydration. Na 146, increased FW to 250q6.   11/2: BD 33 GEORGE overnight, neuro stable, given 500cc bolus for net negative status and tachycardia   11/3: BD 34, GEORGE overnight, neuro stable. Patient remains tachycardic, EKG showing sinus tachycardia, given additional 500cc NS bolus. Febrile to 101.9F, pan cultured (without UA), CXR WNL, given tylenol.   11/4: BD 35, GEORGE overnight, neuro stable. Given 1L NS for tachycardia. sputum (+) for stenotropohomas maltophilia.   11/5: BD 36 GEORGE overnight, neuro stable. Vancomycin dc'd. Chest PT BID. ID consulted, cont zosyn.  11/6: BD 37. blood cx + klebsiella dc zosyn changed to cefepime, CTAP ordered, rpt blood cx sent.    11/7: BD 38. Pending CT A/P, given 250cc bolus and starting maintenance fluids overnight. Pending CT A/P after bolus   11/8: BD 39. CT CAP negative for infection.   11/9: BD 40. GEORGE overnight.  11/10: BD 41. GEORGE overnight. desat to 85 on trach collar, O2 inc to 10L and 100%, O2 sat inc to 95. pt tachy to 110s, euvolemic. given tylenol. ABG and CXR ordered. spiked fever, pancultured, RVP negative. AM ABG w pO2 79, rpt w pO2 79. pt appears comfortable, satting 94%.   11/11: BD 42. GEORGE overnight. pt became tachy to 130s, desat to 90 on 100% FiO2 and 10L. suctioned, (+) productive cough. temp 101.4, given 1g IV tylenol and 500cc NS bolus for euvolemia. fever and HR downtrending. LE dopplers negative for dvt  11/12: BD 43, GEORGE ovn, fever and HR downtrending, satting 97% 70% FIO2  11/13: BD 44, GEORGE ovn. started standing tylenol x24 hours for tachycardia. desat to 80s, o2 increased. CXR stable, pending CTA PE protocol.   11/14: BD 45, GEORGE overnight, neuro stable. resp therapy dec FiO2 to 70%.   11/15: BD 46, GEORGE overnight, neuro stable.  Rapid called for desaturation 30s, tachycardic 140s. Patient bagged, 100% fio2, heavily suctioned. CXR/POCUS unremarkable. ABG c/w desaturation. WBC 14.71. Afebrile. O2 improved to 90s and patient upgraded to ICU. ABG paO2 30s improved to 89 on vent. IV Tylenol x 1, sputum sent. Start protonix while o-n vent.   11/16: BD 47. POCUS showed collapsable IVCF, given 1L bolus. Vanco/Cefpime added empriic for PNA, NGT feeds restarted. MRSA swab neg, Vanc DC'd.   11/17: BD 48. GEORGE overnight. 1l bolus for tachycardia. Spiked to 101, cultured. 500cc bolus for tachycardia, tachy to 148 given 25mcg fentanyl, 250cc albumin, 1.5L bolus. 5 IV lopressor with response HR to 100s. +Stenotrophomonas on sputum cx.   11/18: BD 49. GEORGE overnight. Consulted ID, cefepime switched to bactrim x7days. Started hydrochlorothiazine 12.5mg daily.  11/19: BD 50. Tachy 120s, given tylenol and 500cc NS. Tolerating 5/5, switched to TCx. Holding phos binder. D/c Bactrim. D/c gabriel, f/u TOV. Dc'd PPI.   11/20: BD 51. GEORGE ovn. 1600 satting low 90s, mildly tachy to 110s, afebrile, RR wnl. O2 improved to mid 90s while inc O2 to 100% on TCx. CPAP 5/5 placed back on.  11/21: BD 52, GEORGE ovn, tolerating CPAP 5/5. Switch to trach collar during the day if tolerating well. HCTZ held for Cr bump, straight cath frequence increased to q4  11/22: BD 53, GEORGE ovn. Resumed phoslo. Gabriel placed. Resumed HCTZ.   11/23: BD 54. Holding tylenol in setting of possible fever, will require pan cx if febrile. Cr improved today. Cont CPAP. Bowel regimen held i/s/o diarrhea. FOBT negative.  11/24: BD 55. GEORGE overnight. Neuro stable. HCTZ dc'ed, started lisinopril 5. Lokelma dc'ed for K 3.7.   11/25: BD 56, GEORGE overnight. Neuro stable. dc'd lisinopril 5mg. Gabriel dc'd. TOV. 1545 noted to be hypotensive, MAP 50, in supine position on chair, HR 60s, afebrile, O2 96%. Given 1L cc NS bolus, placed back on bed in reverse trendelenberg, improved to map of 66. Neostick at bedside. Vitals check q1h. Dc'ed amlodipine. Failed TOV, bladder scan q6, sc prn. Added back Senna.   11/26: BD 57, GEORGE overnight. Neuro stable. Dc'd phoslo.   11/27: BD 58, GEORGE overnight. Neuro stable.    11/28: BD 59. Gabriel replaced.   11/29: GEORGE.  11/30: GEORGE, neuro stable.   12/1: BD 62, GEORGE overnight  12/2: BD 63, GEORGE overnight.; Given 1L bolus of LR for uptrending BUN/Cr.  12/3: BD 64. Reinstated eye gtt/moisture chamber given increased Rt eye injection  12/4: BD 65. GEORGE overnight. Attempted to speak with ophtho regarding eyelid weight/closure but no answer, full mailbox.   12/5: BD 66, GEORGE overnight, bowel regimen increased and had BM.   12/6: BD 67, GEORGE overnight, neuro stable.  12/7: GEORGE overnight, neuro stable. /110s, given x1 hydralazine 10 mg IVP. Restarted home amlodipine 5mg.  12/8: GEORGE. OOB to chair.     12/11: GEORGE, mucomyst added for thick secretions, simethicone for abd distension, abd xray with stool burden, increased bowel regimen.   12/12: GEORGE overnight.   12/13: GEORGE overnight.   12/14: GEORGE overnight  12/15: o/n Patient became tachycardic HR 120s and 10 minutes later O2 sat dropped as low as 89%. Patient suctioned without improvement in O2 sat and tube feeds found in suction catheter. TFs held.  STAT CXR ordered. STAT labs sent. Respiratory therapist called to bedside and patient trach connected to ventilator. After connection to ventilator and further suctioning O2 sat improved to 97% but patient HR remains 120-130s. Upgraded to NSICU for further management. Vancomycin and zosyn started. CTA  chest PE protocol and CTH ordered. Blood cultures sent.given 500cc bolus, rpt ABG sent pO2 243, CTH and CTA chest done. FS while NPO, FiO2 dec 50 pending ABG. sputum cx positive for few GPC and GNR.   12/16: GEORGE overnight, restarted amlodipine, troponin 75   12/17: GEORGE overnight, neuro stable. Attempted CPAP this morning, did not tolerate and back on full vent support. Dc'd Vanc. Tachycardia to 140s, noted extremities to be twitching along with jaw twitching. Given 2g of Keppra, total 4mg ativan and placed on EEG, full set of labs and lactate negative. Resumed trickle feeds at 20cc/hr. Given 250cc albumin for tachycardia.   12/18: GEORGE overnight. Neuro stable. CTH stable. EEG negative and dc'd.   12/19: GEORGE overnight. neuro stable. SIMV most of day, AC/VC at night.   12/20: GEORGE overnight, neuro stable. remains on VC/AC  12/21: GEORGE ovn. 1L bolus for tachy to 120s-130s.   12/22: GEORGE ovn. Tachy to 120s, given tylenol and IVF. 2mg IV ativan given for L jaw twitching. Sx resolved for 3 minutes and started again. Epilepsy contacted. Given 2mg IV ativan. Continued twitching. Increased keppra to 1500mg BID, 2mg ativan given. Propranolol started for refractory tachycardia. vEEG ordered. albumin given. POCUS performed, no b lines. Started on fosphenytoin, loaded w 20mg/kg then 100mg TID. febrile, pancx, started cefepime 2g q8, vancomycin  12/23: GEORGE ovn. +focal motor seizures on eeg. ID consulted. Vancomycin dc'ed as per ID.  12/24: GEORGE ovn, neuro stable. Baclofen 5 mg q12 started for hiccups. Na 129 from 134. Urine lytes c/w SIADH. Given 250cc 3% bolus. CT chest for infection w/u - f/u read. Repeat Na 136.  12/25: GEORGE ovn    Vital Signs Last 24 Hrs  T(C): 37.1 (24 Dec 2024 20:35), Max: 38.3 (24 Dec 2024 09:13)  T(F): 98.7 (24 Dec 2024 20:35), Max: 101 (24 Dec 2024 09:13)  HR: 80 (24 Dec 2024 23:45) (80 - 104)  BP: 113/73 (24 Dec 2024 23:45) (108/72 - 125/86)  BP(mean): 89 (24 Dec 2024 23:45) (85 - 101)  RR: 15 (24 Dec 2024 23:45) (14 - 19)  SpO2: 99% (24 Dec 2024 23:45) (96% - 100%)    Parameters below as of 24 Dec 2024 23:45  Patient On (Oxygen Delivery Method): ventilator  O2 Flow (L/min): 40      I&O's Summary    23 Dec 2024 07:01  -  24 Dec 2024 07:00  --------------------------------------------------------  IN: 2880 mL / OUT: 2075 mL / NET: 805 mL    24 Dec 2024 07:01  -  25 Dec 2024 00:18  --------------------------------------------------------  IN: 1150 mL / OUT: 1550 mL / NET: -400 mL        PHYSICAL EXAM:  GEN: laying in bed, NAD  NEURO: AOx0. does not FC, OE spont, face symmetric. PERRL. LUE 0/5, LLE w/d, RUE w/d, RLE trace w/d  CV: RRR +S1/S2  PULM: CTAB  GI: Abd soft, NT/ND  EXT: ext warm, dry, nontender    TUBES/LINES:  [x] Gabriel  [] Lumbar Drain  [] Wound Drains  [] Others      DIET:  [] NPO  [] Mechanical  [x] Tube feeds    LABS:                        10.1   10.73 )-----------( 212      ( 24 Dec 2024 05:30 )             32.7     12-24    136  |  103  |  34[H]  ----------------------------<  113[H]  4.2   |  23  |  1.28    Ca    8.6      24 Dec 2024 15:02  Phos  3.6     12-24  Mg     2.0     12-24    TPro  6.7  /  Alb  3.1[L]  /  TBili  0.2  /  DBili  x   /  AST  26  /  ALT  50[H]  /  AlkPhos  111  12-24      Urinalysis Basic - ( 24 Dec 2024 15:02 )    Color: x / Appearance: x / SG: x / pH: x  Gluc: 113 mg/dL / Ketone: x  / Bili: x / Urobili: x   Blood: x / Protein: x / Nitrite: x   Leuk Esterase: x / RBC: x / WBC x   Sq Epi: x / Non Sq Epi: x / Bacteria: x          CAPILLARY BLOOD GLUCOSE          Drug Levels: [] N/A  Phenytoin Level, Serum: 12.2 ug/mL (12-24 @ 05:30)    CSF Analysis: [] N/A      Allergies    Allergy Status Unknown    Intolerances      MEDICATIONS:  Antibiotics:  cefepime   IVPB 2000 milliGRAM(s) IV Intermittent every 8 hours    Neuro:  acetaminophen   Oral Liquid .. 650 milliGRAM(s) Oral every 6 hours PRN  baclofen 5 milliGRAM(s) Oral every 12 hours  fosphenytoin IVPB 100 milliGRAM(s) PE IV Intermittent three times a day  levETIRAcetam 1500 milliGRAM(s) Oral two times a day  oxyCODONE    Solution 5 milliGRAM(s) Oral every 4 hours PRN    Anticoagulation:  heparin   Injectable 5000 Unit(s) SubCutaneous every 8 hours    OTHER:  acetylcysteine 10%  Inhalation 4 milliLiter(s) Inhalation every 6 hours  albuterol/ipratropium for Nebulization 3 milliLiter(s) Nebulizer every 6 hours  amLODIPine   Tablet 5 milliGRAM(s) Oral daily  artificial tears (preservative free) Ophthalmic Solution 1 Drop(s) Right EYE every 4 hours  chlorhexidine 0.12% Liquid 15 milliLiter(s) Oral Mucosa every 12 hours  chlorhexidine 2% Cloths 1 Application(s) Topical <User Schedule>  doxazosin 8 milliGRAM(s) Oral at bedtime  erythromycin   Ointment 1 Application(s) Right EYE at bedtime  fluticasone propionate 50 MICROgram(s)/spray Nasal Spray 1 Spray(s) Both Nostrils two times a day  ofloxacin 0.3% Solution 1 Drop(s) Right EYE four times a day  pantoprazole  Injectable 40 milliGRAM(s) IV Push daily  petrolatum Ophthalmic Ointment 1 Application(s) Both EYES two times a day  propranolol 10 milliGRAM(s) Oral every 8 hours  sodium chloride 0.65% Nasal 1 Spray(s) Both Nostrils two times a day    IVF:    CULTURES:  Culture Results:   No growth at 24 hours (12-22 @ 17:45)  Culture Results:   No growth at 24 hours (12-22 @ 17:45)    RADIOLOGY & ADDITIONAL TESTS:      ASSESSMENT:  45 y/o PMH ?stroke/MI present to [facility] after collapsing at work. Decorticate posturing, vomiting, intubated for airway protection. 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 Transferred to [facility] for further management. s/p trach 10/14. s/p peg 10/21. Re-upgrade to ICU 2/2 desaturation event and suctioning requirements 11/15. Re-upgrade to NSICU 12/15 2/2 desaturation and tachycardia.    AMS    Handoff    MEWS Score    Acute myocardial infarction    CVA (cerebral vascular accident)    Intracerebral hemorrhage of brain stem    Brainstem stroke    Brain stem stroke syndrome    Brain stem hemorrhage    Brain stem stroke syndrome    Hemorrhagic stroke    Brainstem stroke    Encephalopathy acute    Functional quadriplegia    Advanced care planning/counseling discussion    Encounter for palliative care    Pontine hemorrhage    Neurogenic dysphagia    Chronic respiratory failure    Acute kidney injury superimposed on CKD    Acute urinary retention    Hypertensive emergency    Sepsis, unspecified organism    Sepsis    Gram-negative bacteremia    Percutaneous tracheal puncture    Altered mental status examination    EGD, with PEG    AMS    Room Service Assist    [email or web address]        PLAN:  Neuro:  - neuro/vitals q4h  - pain control: tylenol prn  - seizure tx: keppra 1500mg BID, fosphenytoin 100mg TID  - vEEG (10/3-4) negative, (10/17-10/19) negative, (12/17-12/18) negative, (12/22-12/25 ) +focal motor seizures not correlating w/ EEG  - epilepsy following  - CTH 9/30: enlarged pontine hemorrhage, CTH 10/3: stable, CTH 10/25: mostly resolved pontine hemorrhage, CTH 12/15: R mastoid air cell opacification; acute otitis media vs sterile effusion, CTH 12/18: stable.  - MRI brain 10/12: parenchymal hemorrhage, acute/subacute R cerebellar stroke    - stroke core measures, stroke neuro signed off    CV:  - -160  - tachycardia: propranolol 10mg q8  - HTN: amlodipine 5mg, hold lisinopril 5mg   - echo (9/30) EF 75%, repeat 12/16: 57%    Resp:  - trach to vent, AC/VC 40/400/14/6  - CTA PE protocol 12/15 negative   - Secretions: duonebs/mucomyst/chest PT q6h   - CT Chest completed 12/24 for infectious w/u: pending read    GI:  - TF via PEG (placed 10/21 by gen surg)  - bowel regimen held for loose stool, last BM 12/23  - baclofen 5q12 for hiccups     Renal:  - IVL  - Urinary retenion: Gabriel replaced 12/15  - CKD: trend BUN/Cr  - renal US 10/1: echogenicity c/w chronic med renal dz, repeat 10/8: inc renal echogeicity, c/f medical renal disease w increased hydro     Endo:  - A1c 5.4    Heme:  - DVT ppx: SCDs, SQH 5000u q8h   - LE dopplers negative 12/16    ID:  - last pan cx 12/22  - empiric PNA: cefepime (12/22 - ), s/p vanc (12/22-12/23), s/p zosyn (12/15-12/20), s/p vanc (12/15-12/16)  - s/p Stenotrophomonas maltophilia PNA: s/p Bactrim (11/18-11/19) s/p Cefepime (11/16-11/18)  - 11/3, (+) sputum for stenotrophomas maltophlia, blood cx (+) klebsiella, cefepime 2gq12 (11/6 - 11/12)   - empiric tx: s/p zosyn (11/3-11/6) , s/p vanc  (11/3-11/5)  - S/p Ancef (10/4-10/14) for PNA, and s/p Unasyn (10/18-10/23) +actinobacter baumanii     MISC:  - ophtho consult for keratitis  - erythromycin ointment R eye q4hrs, ofloxacin ointment R eye QID, artificial tears R eye q2hrs, moisture chamber at bedtime    Dispo: SDU status, full code, pending placement and guardianship hearing     D/w Dr. D'Amico     Assessment:  Present when checked    []  GCS  E   V  M     Heart Failure: []Acute, [] acute on chronic , []chronic  Heart Failure:  [] Diastolic (HFpEF), [] Systolic (HFrEF), []Combined (HFpEF and HFrEF), [] RHF, [] Pulm HTN, [] Other    [] ANIKA, [] ATN, [] AIN, [] other  [] CKD1, [] CKD2, [] CKD 3, [] CKD 4, [] CKD 5, []ESRD    Encephalopathy: [] Metabolic, [] Hepatic, [] toxic, [] Neurological, [] Other    Abnormal Nurtitional Status: [] malnurtition (see nutrition note), [ ]underweight: BMI < 19, [] morbid obesity: BMI >40, [] Cachexia    [] Sepsis  [] hypovolemic shock,[] cardiogenic shock, [] hemorrhagic shock, [] neuogenic shock  [] Acute Respiratory Failure  []Cerebral edema, [] Brain compression/ herniation,   [] Functional quadriplegia  [] Acute blood loss anemia

## 2024-12-25 NOTE — PROGRESS NOTE ADULT - SUBJECTIVE AND OBJECTIVE BOX
INFECTIOUS DISEASES CONSULT FOLLOW-UP NOTE    INTERVAL HPI/OVERNIGHT EVENTS:  Afebrile, WBC down to 8k    ROS:   EBER d/t mental status    ANTIBIOTICS/RELEVANT:    MEDICATIONS  (STANDING):  acetylcysteine 10%  Inhalation 4 milliLiter(s) Inhalation every 6 hours  albuterol/ipratropium for Nebulization 3 milliLiter(s) Nebulizer every 6 hours  amLODIPine   Tablet 5 milliGRAM(s) Oral daily  artificial tears (preservative free) Ophthalmic Solution 1 Drop(s) Right EYE every 4 hours  baclofen 5 milliGRAM(s) Oral every 12 hours  cefepime   IVPB 2000 milliGRAM(s) IV Intermittent every 8 hours  chlorhexidine 0.12% Liquid 15 milliLiter(s) Oral Mucosa every 12 hours  chlorhexidine 2% Cloths 1 Application(s) Topical <User Schedule>  doxazosin 8 milliGRAM(s) Oral at bedtime  erythromycin   Ointment 1 Application(s) Right EYE at bedtime  fluticasone propionate 50 MICROgram(s)/spray Nasal Spray 1 Spray(s) Both Nostrils two times a day  fosphenytoin IVPB 100 milliGRAM(s) PE IV Intermittent three times a day  heparin   Injectable 5000 Unit(s) SubCutaneous every 8 hours  levETIRAcetam 1500 milliGRAM(s) Oral two times a day  ofloxacin 0.3% Solution 1 Drop(s) Right EYE four times a day  pantoprazole  Injectable 40 milliGRAM(s) IV Push daily  petrolatum Ophthalmic Ointment 1 Application(s) Both EYES two times a day  propranolol 10 milliGRAM(s) Oral every 8 hours  sodium chloride 0.65% Nasal 1 Spray(s) Both Nostrils two times a day    MEDICATIONS  (PRN):  acetaminophen   Oral Liquid .. 650 milliGRAM(s) Oral every 6 hours PRN Temp greater or equal to 38.5C (101.3F), Mild Pain (1 - 3)  oxyCODONE    Solution 5 milliGRAM(s) Oral every 4 hours PRN Moderate Pain (4 - 6)        Vital Signs Last 24 Hrs  T(C): 36.4 (25 Dec 2024 18:03), Max: 37.2 (25 Dec 2024 04:50)  T(F): 97.6 (25 Dec 2024 18:03), Max: 99 (25 Dec 2024 04:50)  HR: 76 (25 Dec 2024 16:13) (76 - 82)  BP: 117/73 (25 Dec 2024 16:13) (109/72 - 117/73)  BP(mean): 91 (25 Dec 2024 16:13) (86 - 91)  RR: 14 (25 Dec 2024 16:13) (14 - 18)  SpO2: 98% (25 Dec 2024 16:13) (98% - 100%)    Parameters below as of 25 Dec 2024 16:13  Patient On (Oxygen Delivery Method): ventilator    O2 Concentration (%): 40    12-24-24 @ 07:01  -  12-25-24 @ 07:00  --------------------------------------------------------  IN: 1330 mL / OUT: 1950 mL / NET: -620 mL    12-25-24 @ 07:01  -  12-25-24 @ 17:57  --------------------------------------------------------  IN: 1220 mL / OUT: 750 mL / NET: 470 mL      PHYSICAL EXAM:  Constitutional: awake, not following commands  Neck: trach site clean, no in-line secretions  Pulmonary: mechanical breath sounds  Heart: heart rate was normal and rhythm regular  Abdomen: PEG site clean, no tenderness  : Vuong  Skin: no rash        LABS:                        10.1   8.11  )-----------( 215      ( 25 Dec 2024 05:30 )             32.1     12-25    136  |  102  |  36[H]  ----------------------------<  113[H]  3.8   |  23  |  1.28    Ca    8.8      25 Dec 2024 05:30  Phos  3.8     12-25  Mg     2.0     12-25    TPro  6.7  /  Alb  3.1[L]  /  TBili  0.2  /  DBili  x   /  AST  26  /  ALT  50[H]  /  AlkPhos  111  12-24      Urinalysis Basic - ( 25 Dec 2024 05:30 )    Color: x / Appearance: x / SG: x / pH: x  Gluc: 113 mg/dL / Ketone: x  / Bili: x / Urobili: x   Blood: x / Protein: x / Nitrite: x   Leuk Esterase: x / RBC: x / WBC x   Sq Epi: x / Non Sq Epi: x / Bacteria: x        MICROBIOLOGY:  Reviewed    RADIOLOGY & ADDITIONAL STUDIES:  Reviewed

## 2024-12-26 DIAGNOSIS — R06.00 DYSPNEA, UNSPECIFIED: ICD-10-CM

## 2024-12-26 LAB
ANION GAP SERPL CALC-SCNC: 11 MMOL/L — SIGNIFICANT CHANGE UP (ref 5–17)
BUN SERPL-MCNC: 40 MG/DL — HIGH (ref 7–23)
CALCIUM SERPL-MCNC: 9.1 MG/DL — SIGNIFICANT CHANGE UP (ref 8.4–10.5)
CHLORIDE SERPL-SCNC: 103 MMOL/L — SIGNIFICANT CHANGE UP (ref 96–108)
CO2 SERPL-SCNC: 22 MMOL/L — SIGNIFICANT CHANGE UP (ref 22–31)
CREAT SERPL-MCNC: 1.25 MG/DL — SIGNIFICANT CHANGE UP (ref 0.5–1.3)
EGFR: 72 ML/MIN/1.73M2 — SIGNIFICANT CHANGE UP
EGFR: 72 ML/MIN/1.73M2 — SIGNIFICANT CHANGE UP
GLUCOSE SERPL-MCNC: 107 MG/DL — HIGH (ref 70–99)
HCT VFR BLD CALC: 34.6 % — LOW (ref 39–50)
HGB BLD-MCNC: 10.7 G/DL — LOW (ref 13–17)
MAGNESIUM SERPL-MCNC: 2.1 MG/DL — SIGNIFICANT CHANGE UP (ref 1.6–2.6)
MCHC RBC-ENTMCNC: 29.3 PG — SIGNIFICANT CHANGE UP (ref 27–34)
MCHC RBC-ENTMCNC: 30.9 G/DL — LOW (ref 32–36)
MCV RBC AUTO: 94.8 FL — SIGNIFICANT CHANGE UP (ref 80–100)
NRBC # BLD: 0 /100 WBCS — SIGNIFICANT CHANGE UP (ref 0–0)
NRBC BLD-RTO: 0 /100 WBCS — SIGNIFICANT CHANGE UP (ref 0–0)
PHOSPHATE SERPL-MCNC: 3.4 MG/DL — SIGNIFICANT CHANGE UP (ref 2.5–4.5)
PLATELET # BLD AUTO: 222 K/UL — SIGNIFICANT CHANGE UP (ref 150–400)
POTASSIUM SERPL-MCNC: 4 MMOL/L — SIGNIFICANT CHANGE UP (ref 3.5–5.3)
POTASSIUM SERPL-SCNC: 4 MMOL/L — SIGNIFICANT CHANGE UP (ref 3.5–5.3)
RBC # BLD: 3.65 M/UL — LOW (ref 4.2–5.8)
RBC # FLD: 14.2 % — SIGNIFICANT CHANGE UP (ref 10.3–14.5)
SODIUM SERPL-SCNC: 136 MMOL/L — SIGNIFICANT CHANGE UP (ref 135–145)
WBC # BLD: 9.52 K/UL — SIGNIFICANT CHANGE UP (ref 3.8–10.5)
WBC # FLD AUTO: 9.52 K/UL — SIGNIFICANT CHANGE UP (ref 3.8–10.5)

## 2024-12-26 PROCEDURE — 99233 SBSQ HOSP IP/OBS HIGH 50: CPT

## 2024-12-26 PROCEDURE — 99232 SBSQ HOSP IP/OBS MODERATE 35: CPT

## 2024-12-26 RX ADMIN — Medication 40 MILLIGRAM(S): at 12:00

## 2024-12-26 RX ADMIN — DOXAZOSIN MESYLATE 8 MILLIGRAM(S): 8 TABLET ORAL at 22:31

## 2024-12-26 RX ADMIN — Medication 15 MILLILITER(S): at 05:46

## 2024-12-26 RX ADMIN — Medication 1 DROP(S): at 05:47

## 2024-12-26 RX ADMIN — FOSPHENYTOIN SODIUM 104 MILLIGRAM(S) PE: 50 INJECTION INTRAMUSCULAR; INTRAVENOUS at 05:45

## 2024-12-26 RX ADMIN — Medication 1 DROP(S): at 01:13

## 2024-12-26 RX ADMIN — LEVETIRACETAM 1500 MILLIGRAM(S): 10 INJECTION, SOLUTION INTRAVENOUS at 18:45

## 2024-12-26 RX ADMIN — BACLOFEN 5 MILLIGRAM(S): 10 INJECTION INTRATHECAL at 05:47

## 2024-12-26 RX ADMIN — AMLODIPINE BESYLATE 5 MILLIGRAM(S): 10 TABLET ORAL at 05:46

## 2024-12-26 RX ADMIN — Medication 1 SPRAY(S): at 18:46

## 2024-12-26 RX ADMIN — OFLOXACIN 1 DROP(S): 3 SOLUTION OPHTHALMIC at 05:48

## 2024-12-26 RX ADMIN — IPRATROPIUM BROMIDE AND ALBUTEROL SULFATE 3 MILLILITER(S): .5; 2.5 SOLUTION RESPIRATORY (INHALATION) at 18:42

## 2024-12-26 RX ADMIN — HEPARIN SODIUM 5000 UNIT(S): 1000 INJECTION INTRAVENOUS; SUBCUTANEOUS at 22:23

## 2024-12-26 RX ADMIN — Medication 1 DROP(S): at 14:51

## 2024-12-26 RX ADMIN — Medication 1 DROP(S): at 22:21

## 2024-12-26 RX ADMIN — HEPARIN SODIUM 5000 UNIT(S): 1000 INJECTION INTRAVENOUS; SUBCUTANEOUS at 05:46

## 2024-12-26 RX ADMIN — OFLOXACIN 1 DROP(S): 3 SOLUTION OPHTHALMIC at 18:46

## 2024-12-26 RX ADMIN — FLUTICASONE PROPIONATE 1 SPRAY(S): 50 SPRAY, METERED NASAL at 18:46

## 2024-12-26 RX ADMIN — HEPARIN SODIUM 5000 UNIT(S): 1000 INJECTION INTRAVENOUS; SUBCUTANEOUS at 14:52

## 2024-12-26 RX ADMIN — LEVETIRACETAM 1500 MILLIGRAM(S): 10 INJECTION, SOLUTION INTRAVENOUS at 05:46

## 2024-12-26 RX ADMIN — CEFEPIME 100 MILLIGRAM(S): 2 INJECTION, POWDER, FOR SOLUTION INTRAVENOUS at 22:20

## 2024-12-26 RX ADMIN — Medication 1 APPLICATION(S): at 18:44

## 2024-12-26 RX ADMIN — OFLOXACIN 1 DROP(S): 3 SOLUTION OPHTHALMIC at 12:02

## 2024-12-26 RX ADMIN — CEFEPIME 100 MILLIGRAM(S): 2 INJECTION, POWDER, FOR SOLUTION INTRAVENOUS at 14:51

## 2024-12-26 RX ADMIN — FOSPHENYTOIN SODIUM 104 MILLIGRAM(S) PE: 50 INJECTION INTRAMUSCULAR; INTRAVENOUS at 22:26

## 2024-12-26 RX ADMIN — IPRATROPIUM BROMIDE AND ALBUTEROL SULFATE 3 MILLILITER(S): .5; 2.5 SOLUTION RESPIRATORY (INHALATION) at 04:17

## 2024-12-26 RX ADMIN — IPRATROPIUM BROMIDE AND ALBUTEROL SULFATE 3 MILLILITER(S): .5; 2.5 SOLUTION RESPIRATORY (INHALATION) at 10:58

## 2024-12-26 RX ADMIN — Medication 1 DROP(S): at 18:46

## 2024-12-26 RX ADMIN — Medication 15 MILLILITER(S): at 18:41

## 2024-12-26 RX ADMIN — Medication 1 APPLICATION(S): at 06:16

## 2024-12-26 RX ADMIN — FLUTICASONE PROPIONATE 1 SPRAY(S): 50 SPRAY, METERED NASAL at 05:47

## 2024-12-26 RX ADMIN — ACETYLCYSTEINE 4 MILLILITER(S): 200 INHALANT RESPIRATORY (INHALATION) at 04:16

## 2024-12-26 RX ADMIN — ACETYLCYSTEINE 4 MILLILITER(S): 200 INHALANT RESPIRATORY (INHALATION) at 18:42

## 2024-12-26 RX ADMIN — Medication 1 SPRAY(S): at 05:47

## 2024-12-26 RX ADMIN — FOSPHENYTOIN SODIUM 104 MILLIGRAM(S) PE: 50 INJECTION INTRAMUSCULAR; INTRAVENOUS at 14:51

## 2024-12-26 RX ADMIN — CEFEPIME 100 MILLIGRAM(S): 2 INJECTION, POWDER, FOR SOLUTION INTRAVENOUS at 05:46

## 2024-12-26 RX ADMIN — ACETYLCYSTEINE 4 MILLILITER(S): 200 INHALANT RESPIRATORY (INHALATION) at 10:54

## 2024-12-26 RX ADMIN — BACLOFEN 5 MILLIGRAM(S): 10 INJECTION INTRATHECAL at 18:45

## 2024-12-26 RX ADMIN — Medication 1 DROP(S): at 11:00

## 2024-12-26 RX ADMIN — ERYTHROMYCIN 1 APPLICATION(S): 5 OINTMENT OPHTHALMIC at 22:25

## 2024-12-26 NOTE — PROGRESS NOTE ADULT - ASSESSMENT
43yo M w/ reported PMHx of MI and previous strokes, who was admitted to [facility] on 09/30 for AMS, found to have acute large parenchymal hemorrhage within nabil with mass effect (+ acute/subacute right cerebellar infarct) in setting of hypertensive emergency, and R cerebellar stroke , c/b poor neurologic recovery, now trach/vent dependent/PEG, with recurrent fever/infections and ICU readmissions a/w focal status epilepticus. 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    PPS 10%. Prognosis is poor.  Patient is at high risk of death and complications.  Clinically worsening despite aggressive medical care.  FULL CODE.    Recommend family meeting with neurosurgery team and patient's son, Olivier, for information sharing and updating on worsening of mental status.  Would also discuss with ID if medical indication to continue IV antibiotics if infection reoccurs after the current Abx course.

## 2024-12-26 NOTE — PROGRESS NOTE ADULT - SUBJECTIVE AND OBJECTIVE BOX
[facility] Geriatrics and Palliative Care  Roger Fernandez, Geriatrics & Palliative Medicine attending  Contact Info: Call [phone] (HEAL Line) or message on [employer] Teams    SUBJECTIVE AND OBJECTIVE:  The patient was seen at bedside. He is not able to provide any history due to encephalopathy.   Collateral information obtained from floor team. Per his nurse, since coming back from the NSICU this past week, the patient has not been able to show any signs of meaningful communication with anyone. No evidence of pain or discomfort during routine care. Able to weakly withdraw to pain on LLE.    I called the patient's son, Olivier. He reported being told that his father was stable/improving since admission. For Olivier, the patient continues to be able to interact with the outside world - able to blink eyes, move L hand and L leg on command and recognize persons. Olivier thinks the patient is improving and on the road for recovery. He reports knowing the patient has had some sort of brain problem a few years ago and was able to recover before the current admission. Chauncey says that this brings him hope.     INTERVAL HPI/OVERNIGHT EVENTS: Interval events noted. See patient's PRN use for the past 24hrs noted below.    ALLERGIES:  Allergy Status Unknown    MEDICATIONS  (STANDING):  acetylcysteine 10%  Inhalation 4 milliLiter(s) Inhalation every 6 hours  albuterol/ipratropium for Nebulization 3 milliLiter(s) Nebulizer every 6 hours  amLODIPine   Tablet 5 milliGRAM(s) Oral daily  artificial tears (preservative free) Ophthalmic Solution 1 Drop(s) Right EYE every 4 hours  baclofen 5 milliGRAM(s) Oral every 12 hours  cefepime   IVPB 2000 milliGRAM(s) IV Intermittent every 8 hours  chlorhexidine 0.12% Liquid 15 milliLiter(s) Oral Mucosa every 12 hours  chlorhexidine 2% Cloths 1 Application(s) Topical <User Schedule>  doxazosin 8 milliGRAM(s) Oral at bedtime  erythromycin   Ointment 1 Application(s) Right EYE at bedtime  fluticasone propionate 50 MICROgram(s)/spray Nasal Spray 1 Spray(s) Both Nostrils two times a day  fosphenytoin IVPB 100 milliGRAM(s) PE IV Intermittent three times a day  heparin   Injectable 5000 Unit(s) SubCutaneous every 8 hours  levETIRAcetam 1500 milliGRAM(s) Oral two times a day  ofloxacin 0.3% Solution 1 Drop(s) Right EYE four times a day  pantoprazole  Injectable 40 milliGRAM(s) IV Push daily  petrolatum Ophthalmic Ointment 1 Application(s) Both EYES two times a day  propranolol 10 milliGRAM(s) Oral every 8 hours  sodium chloride 0.65% Nasal 1 Spray(s) Both Nostrils two times a day    MEDICATIONS  (PRN):  acetaminophen   Oral Liquid .. 650 milliGRAM(s) Oral every 6 hours PRN Temp greater or equal to 38.5C (101.3F), Mild Pain (1 - 3)  oxyCODONE    Solution 5 milliGRAM(s) Oral every 4 hours PRN Moderate Pain (4 - 6)      Analgesic Use (Scheduled and PRNs) for past 24 hours:    baclofen   5 milliGRAM(s) Oral (12-26-24 @ 05:47)    fosphenytoin IVPB   104 mL/Hr IV Intermittent (12-26-24 @ 14:51)   104 mL/Hr IV Intermittent (12-26-24 @ 05:45)   104 mL/Hr IV Intermittent (12-25-24 @ 21:38)    levETIRAcetam   1500 milliGRAM(s) Oral (12-26-24 @ 05:46)      ITEMS UNCHECKED ARE NOT PRESENT  PRESENT SYMPTOMS/REVIEW OF SYSTEMS: []Unable to obtain due to poor mentation   Source if other than patient:  []Family   []Team     PHYSICAL EXAM  Young man, lying on bed, eyes open, not able to interact with examiner.  Weakly withdraws to pain on the LLE.  Not able to respond to voice, does not track with eyes.  Not able to follow simple commands in [place].  Trach in place, connected to vent; no vent dyssynchrony noted  PEG tube in place. abd is soft  Skin is intact.    Vital Signs Last 24 Hrs  T(C): 36.5 (26 Dec 2024 16:57), Max: 37.5 (25 Dec 2024 22:03)  T(F): 97.7 (26 Dec 2024 16:57), Max: 99.5 (25 Dec 2024 22:03)  HR: 72 (26 Dec 2024 16:51) (72 - 84)  BP: 129/91 (26 Dec 2024 16:40) (111/75 - 134/92)  BP(mean): 106 (26 Dec 2024 16:40) (88 - 108)  RR: 16 (26 Dec 2024 16:51) (14 - 17)  SpO2: 96% (26 Dec 2024 16:51) (95% - 99%)    Parameters below as of 26 Dec 2024 16:51  Patient On (Oxygen Delivery Method): ventilator    O2 Concentration (%): 40    LABS: Personally reviewed and interpreted                        10.7   9.52  )-----------( 222      ( 26 Dec 2024 05:30 )             34.6   12-26    136  |  103  |  40[H]  ----------------------------<  107[H]  4.0   |  22  |  1.25    Ca    9.1      26 Dec 2024 05:30  Phos  3.4     12-26  Mg     2.1     12-26      RADIOLOGY & ADDITIONAL STUDIES: Personally reviewed and interpreted  < from: CT Chest No Cont (12.24.24 @ 14:11) >    IMPRESSION:  Partial atelectasis both lower lobes, decreased on the left compared to   the prior study. Extensive patchy bilateral lower lobe opacities.    --- End of Report ---    < end of copied text >  < from: CT Head No Cont (12.18.24 @ 11:50) >    FINDINGS:    Old subinsular infarct again noted. Old right brain stem infarct again   noted. Old right frontal corona radiata infarct again noted. No acute   intracranial hemorrhage. No CT evidence of acute ischemia. Ventricles are   normal in size for the degree of overlying atrophy. No extra-axial   collection. No midline shift or downward herniation. Mild calcification   of the carotid siphons. Calvarium and marrow spaces of the skull base   appear unremarkable. Right mastoid effusion is stable from prior.    IMPRESSION:    No acute abnormality. Stable appearance of multiple prior insults in the   supratentorial and infratentorial brain.    --- End of Report ---    < end of copied text >      DISCUSSION OF CASE: Family - to provide updates and emotional support; Primary Team/RN - to discuss plan of care

## 2024-12-26 NOTE — PROGRESS NOTE ADULT - NS ATTEST RISK PROBLEM GEN_ALL_CORE FT
acute illness posing risk to life  chronic illnesses with progression  need to review chart, labs and imaging  discussion of care with primary team  need for independent historian  evaluation for hospice care

## 2024-12-26 NOTE — PROGRESS NOTE ADULT - ASSESSMENT
43 yo M with prolonged hospital stay following acute parenchymal pontine hemorrhage, R cerebellar infarct with poor neurologic recovery s/p trach/PEG s/p multiple antibiotic courses for presumed pneumonia (including Acinetobacter), Klebsiella aerogenes bacteremia (source not identified), respiratory tract colonization by Stenotrophomonas with new fever on 12/22, no leukocytosis, no L shift on 12/23 in setting of new focal seizures/status.  CT chest on 12/24 showed extensive patchy bilateral lower lobe opacities.  Sputum culture is growing Klebsiella aerogenes and Stenotrophomonas maltophilia.  UA neg.  He was last febrile on 12/24.  Suggest:  - F/U blood cultures from 12/22  - F/U sputum culture from 12/25 for susceptibilities  - Continue cefepime 2 g IV q8h for now.    Will follow with you, team 2.

## 2024-12-26 NOTE — PROGRESS NOTE ADULT - ASSESSMENT
44M with unclear PMH (?stroke, MI) who was found down at work, intubated for airway protection and found to have acute parenchymal hemorrhage within nabil with mass effect (+ acute/subacute right cerebellar infarct) in setting of hypertensive emergency, transferred to [facility] for further neurosurgical care. Hospital course c/b poor neurologic recovery s/p trach-PEG, AUR s/p gabriel, ANIKA on CKD c/b hyperkalemia, HAP s/p amp-sulbactam (EOT 10/23), K aerogenes bacteremia  treated with 2 weeks of Cefepime per ID , On 11/15 he became septic and was transferred to NSICU due to increased o2 requirements and needed Vent support , Trach Cultures + for Stenotrophomonas which was treated with 7 days of Bactrim per ID , has been weaned of Vent since 11/23 and is now on 10lts at 40% o2 through Trach Collar. Stepped up to the NSICU on 12/15 for hypoxia and tachycardia. PE ruled out. On abx for 5 days. Unclear etiology. Now stepped down to 8Lach.    # Pontine hemorrhage  # Encephalopathy due to Intracranial Bleed   - Acute parenchymal hemorrhage within nabil with mass effect (+ acute/subacute right cerebellar infarct) in setting of hypertensive emergency.   - MRI brain also demonstrated acute/subacute R cerebellar stroke.   - No Neurosurgical Interventions were performed   - due to IPH and cerebellar stroke patient has become Trach and Peg Dependent    # Goals of Care   - Given recurrent infections and now with Refractory Seizures , Vent Dependence palliative care was reconsulted and will re engage with family and Court appointed guardian     # Hyponatremia RESOLVED  - ? SIADH from Seizures Vs Fosphenytoin Related.    # Seizures  - f/u vEEG and epilepsy Recommendations   - on Keppra and Fosphenytoin     # Suspected Hospital Acquired Pneumonia   - CT Chest  with evidence of Bilateral lower lobe infiltrates   - Per ID continue Cefepime , Monitor blood cx and trach cultures     # Hypertension  - c/w amlodipine 5mg daily     # Acute kidney injury  # Acute Tubular Necrosis superimposed on CKD. 3  - Pt s/p US renal with chronic medical renal disease  - Elevated BUN , Not on any meds that can elevate BUN , Hgb Stable   - Cr 1.18 within pt baseline range, ctm     # Chronic respiratory failure.   -Pt s/p percutaneous trach by pulm on 10/14/24  - Currently on Vent Support , No Increase in O2 requirement   - Chest PT    # Neurogenic dysphagia.   - Pt s/p PEG with surgery 10/21/24  - TF per nutrition  - aspiration precautions and elevation of HOB.    #Acute urinary retention.   - doxazosin 8mg    # Functional quadriplegia in  setting of brainstem hemorrhage  - decub precautions  - care per nursing protocol     # DVT prop  - Heparin SQ q8     # Dispo:   Pending Prucol , Court Appointed Guardigeorge Pierson , Jason Olivier

## 2024-12-26 NOTE — CHART NOTE - NSCHARTNOTEFT_GEN_A_CORE
Admitting Diagnosis:   Patient is a 44y old  Male who presents with a chief complaint of brain stem bleed (26 Dec 2024 12:23)      PAST MEDICAL & SURGICAL HISTORY:  Acute myocardial infarction      CVA (cerebral vascular accident)        Current Nutrition Order:  Diet, NPO with Tube Feed:   Tube Feeding Modality: Gastrostomy  MUJIN Renal Support 1.8  Total Volume for 24 Hours (mL): 1080  Total Number of Cans: 5  Continuous  Starting Tube Feed Rate {mL per Hour}: 30  Increase Tube Feed Rate by (mL): 10     Every 4 hours  Until Goal Tube Feed Rate (mL per Hour): 60  Tube Feed Duration (in Hours): 18  Tube Feed Start Time: 10:00  Tube Feed Stop Time: 04:00  Banatrol TF     Qty per Day:  2 (12-18-24 @ 09:33) [Active]     PO Intake: N/A... remains NPO with tube feeds as primary source of nutrition (tube feeds running at goal rate this afternoon upon RD visit)     GI Issues: fecal incontinence; last BM noted on 12/25 - RN denies diarrhea    Pain: Absence of non-verbal indicators of pain    Skin Integrity: no pressure ulcers, no noted edema; PEG present; Mookie: 10         12-25-24 @ 07:01  -  12-26-24 @ 07:00  --------------------------------------------------------  IN: 2800 mL / OUT: 1700 mL / NET: 1100 mL    12-26-24 @ 07:01  -  12-26-24 @ 16:31  --------------------------------------------------------  IN: 670 mL / OUT: 550 mL / NET: 120 mL        Labs:   12-26    136  |  103  |  40[H]  ----------------------------<  107[H]  4.0   |  22  |  1.25    Ca    9.1      26 Dec 2024 05:30  Phos  3.4     12-26  Mg     2.1     12-26      CAPILLARY BLOOD GLUCOSE          Medications:  MEDICATIONS  (STANDING):  acetylcysteine 10%  Inhalation 4 milliLiter(s) Inhalation every 6 hours  albuterol/ipratropium for Nebulization 3 milliLiter(s) Nebulizer every 6 hours  amLODIPine   Tablet 5 milliGRAM(s) Oral daily  artificial tears (preservative free) Ophthalmic Solution 1 Drop(s) Right EYE every 4 hours  baclofen 5 milliGRAM(s) Oral every 12 hours  cefepime   IVPB 2000 milliGRAM(s) IV Intermittent every 8 hours  chlorhexidine 0.12% Liquid 15 milliLiter(s) Oral Mucosa every 12 hours  chlorhexidine 2% Cloths 1 Application(s) Topical <User Schedule>  doxazosin 8 milliGRAM(s) Oral at bedtime  erythromycin   Ointment 1 Application(s) Right EYE at bedtime  fluticasone propionate 50 MICROgram(s)/spray Nasal Spray 1 Spray(s) Both Nostrils two times a day  fosphenytoin IVPB 100 milliGRAM(s) PE IV Intermittent three times a day  heparin   Injectable 5000 Unit(s) SubCutaneous every 8 hours  levETIRAcetam 1500 milliGRAM(s) Oral two times a day  ofloxacin 0.3% Solution 1 Drop(s) Right EYE four times a day  pantoprazole  Injectable 40 milliGRAM(s) IV Push daily  petrolatum Ophthalmic Ointment 1 Application(s) Both EYES two times a day  propranolol 10 milliGRAM(s) Oral every 8 hours  sodium chloride 0.65% Nasal 1 Spray(s) Both Nostrils two times a day    MEDICATIONS  (PRN):  acetaminophen   Oral Liquid .. 650 milliGRAM(s) Oral every 6 hours PRN Temp greater or equal to 38.5C (101.3F), Mild Pain (1 - 3)  oxyCODONE    Solution 5 milliGRAM(s) Oral every 4 hours PRN Moderate Pain (4 - 6)      Dosing Anthropometrics:   Height for BMI (FEET)	5 Feet  Height for BMI (INCHES)	8 Inch(s)  Height for BMI (CM)	172.72 Centimeter(s)  Weight for BMI (lbs)	176.4 lb  Weight for BMI (kg)	80 kg  Body Mass Index	              26.8  ideal body weight:               154 pounds / 70 kg   %ideal body weight             114%     Weight Change: No new wt obtained since admit, strongly recommend nursing to obtain new wt to track/ trend changes     Estimated energy needs:  Weight used for calculations	ideal body weight  Estimated Energy Needs Weight (lbs)	154 lb  Estimated Energy Needs Weight (kg)	69.8 kg  Estimated Energy Needs From (christiana/kg)	25  Estimated Energy Needs To (christiana/kg)	30  Estimated Energy Needs Calculated From (christiana/kg)	1745  Estimated Energy Needs Calculated To (christiana/kg)	2094  Weight used for calculations	ideal body weight  Estimated Protein Needs Weight (lbs)	154 lb  Estimated Protein Needs Weight (kg)	69.8 kg  Estimated Protein Needs From (g/kg)	1.2  Estimated Protein Needs To (g/kg)	1.4  Estimated Protein Needs Calculated From (g/kg)	83.76  Estimated Protein Needs Calculated To (g/kg)	97.72  Estimated Fluid Needs Weight (lbs)	154 lb  Estimated Fluid Needs Weight (kg)	69.8 kg  Estimated Fluid Needs From (ml/kg)	25  Estimated Fluid Needs To (ml/kg)	30  Estimated Fluid Needs Calculated From (ml/kg)	1745  Estimated Fluid Needs Calculated To (ml/kg)	2094  Other Calculations	Pt is >100% ideal body weight, stepped back up to the ICU, thus ideal body weight used for all calculations. Needs adjusted for age, clinical course.     Subjective: This is a 44 year old male, unknown past medical history (reported stroke and MI by coworkers) presented to [facility] with AMS, Pt was working at [employer] when he was found down by coworkers. EMS called and pt brought to [facility] ED. Intubated, sedated, started on cardene for SBPs in 200s. CT head showed brain stem bleed. Transferred to NSICU for further management.    Patient seen at bedside on 8LA for nutrition follow up. Current diet order: NPO with tube feeds of MUJIN Renal Support back at goal rate of 60 cc/hr x18 hrs (providing ~ 1944 kcal, 86g protein, 713 mL free water) to meet 100% estimated nutrient needs. GI WNL as per flowsheet, RN reports large bowel movement last night but denies diarrhea at this time. Labs: BUN elevated, serum glucose 107 mg/dL (WNL). Meds reviewed as above. RD will continue to follow, see nutrition recommendations below.     Previous Nutrition Diagnosis: Inadequate Oral Intake related to inability to meet nutritional demands via PO as evidenced by NPO with tube feedings    Active [ x  ]  Resolved [   ]    Goal: Pt will consume >/= 75% of estimated nutrient needs via tolerated route     Recommendations:   1. Continue with current tube feed regimen to meet 100% estimated nutrient needs:  **Maribethdipesh Sauceda Renal Support 1.8: at goal of 60mL/hour x 18 hours, providing 1080mL total volume, 1944 calories, 86g protein, ~713mL free water; this meets ~24 calories/kg ideal body weight, ~1.1g protein/kg ideal body weight**  **Provide Banatrol Tube Feeding prn**  **Maintain aspiration precautions at all times; monitor for signs/symptoms of intolerance**  2. Monitor weights (weekly), GI function, skin integrity; electrolytes, BMP, glucose, CBC per MD discretion *renal indices*  3. Pain and bowel regimen per medical team  4. Align nutrition with goals of care at all times  5. Recommend nursing to obtain new weights to track/trend changes    Education: N/A due to pt's cognitive status.     Risk Level: High [   ] Moderate [  x ] Low [   ]

## 2024-12-26 NOTE — PROGRESS NOTE ADULT - PROBLEM SELECTOR PLAN 3
.  -management as per primary team   -case discussed w/ neurosurgery PA on 12/24; no plan for further intervention at the moment  -hospice eligible if within GOC

## 2024-12-26 NOTE — PROGRESS NOTE ADULT - SUBJECTIVE AND OBJECTIVE BOX
Patient is a 44y old  Male who presents with a chief complaint of brain stem bleed (25 Dec 2024 00:18)      SUBJECTIVE / OVERNIGHT EVENTS:  On empiric antibiotics.   Afebrile overnight.      MEDICATIONS  (STANDING):  acetylcysteine 10%  Inhalation 4 milliLiter(s) Inhalation every 6 hours  albuterol/ipratropium for Nebulization 3 milliLiter(s) Nebulizer every 6 hours  amLODIPine   Tablet 5 milliGRAM(s) Oral daily  artificial tears (preservative free) Ophthalmic Solution 1 Drop(s) Right EYE every 4 hours  baclofen 5 milliGRAM(s) Oral every 12 hours  cefepime   IVPB 2000 milliGRAM(s) IV Intermittent every 8 hours  chlorhexidine 0.12% Liquid 15 milliLiter(s) Oral Mucosa every 12 hours  chlorhexidine 2% Cloths 1 Application(s) Topical <User Schedule>  doxazosin 8 milliGRAM(s) Oral at bedtime  erythromycin   Ointment 1 Application(s) Right EYE at bedtime  fluticasone propionate 50 MICROgram(s)/spray Nasal Spray 1 Spray(s) Both Nostrils two times a day  fosphenytoin IVPB 100 milliGRAM(s) PE IV Intermittent three times a day  heparin   Injectable 5000 Unit(s) SubCutaneous every 8 hours  levETIRAcetam 1500 milliGRAM(s) Oral two times a day  ofloxacin 0.3% Solution 1 Drop(s) Right EYE four times a day  pantoprazole  Injectable 40 milliGRAM(s) IV Push daily  petrolatum Ophthalmic Ointment 1 Application(s) Both EYES two times a day  propranolol 10 milliGRAM(s) Oral every 8 hours  sodium chloride 0.65% Nasal 1 Spray(s) Both Nostrils two times a day    MEDICATIONS  (PRN):  acetaminophen   Oral Liquid .. 650 milliGRAM(s) Oral every 6 hours PRN Temp greater or equal to 38.5C (101.3F), Mild Pain (1 - 3)  oxyCODONE    Solution 5 milliGRAM(s) Oral every 4 hours PRN Moderate Pain (4 - 6)        CAPILLARY BLOOD GLUCOSE        PHYSICAL EXAM:    Vital Signs Last 24 Hrs  T(C): 36.7 (26 Dec 2024 09:00), Max: 37.5 (25 Dec 2024 22:03)  T(F): 98.1 (26 Dec 2024 09:00), Max: 99.5 (25 Dec 2024 22:03)  HR: 78 (26 Dec 2024 12:05) (72 - 84)  BP: 131/90 (26 Dec 2024 12:05) (110/70 - 131/90)  BP(mean): 105 (26 Dec 2024 12:05) (86 - 105)  RR: 14 (26 Dec 2024 12:05) (14 - 18)  SpO2: 95% (26 Dec 2024 12:05) (95% - 99%)    Parameters below as of 26 Dec 2024 12:05  Patient On (Oxygen Delivery Method): ventilator        General: comfortable-appearing, no WOB on trach collar  HEENT: tracheostomy clean  Lungs: no crackles, no wheezes  Heart: regular  Abdomen: soft, no visible tenderness, + BS  Extremities: warm, no edema, not moving to command        LABS:                          10.7   9.52  )-----------( 222      ( 26 Dec 2024 05:30 )             34.6     12-26    136  |  103  |  40[H]  ----------------------------<  107[H]  4.0   |  22  |  1.25    Ca    9.1      26 Dec 2024 05:30  Phos  3.4     12-26  Mg     2.1     12-26                           Urinalysis Basic - ( 25 Dec 2024 05:30 )    Color: x / Appearance: x / SG: x / pH: x  Gluc: 113 mg/dL / Ketone: x  / Bili: x / Urobili: x   Blood: x / Protein: x / Nitrite: x   Leuk Esterase: x / RBC: x / WBC x   Sq Epi: x / Non Sq Epi: x / Bacteria: x        RADIOLOGY & ADDITIONAL TESTS:    Imaging Personally Reviewed:    Consultant(s) Notes Reviewed:      Care Discussed with Consultants/Other Providers:

## 2024-12-26 NOTE — PROGRESS NOTE ADULT - SUBJECTIVE AND OBJECTIVE BOX
HPI:  Unknown age male, unknown past medical history (reported stroke and MI by coworkers) presented to [facility] with AMS, Pt was working at [employer] when he was found down by coworkers. EMS called and pt brought to [facility] ED. Intubated, sedated, started on cardene for SBPs in 200s. CT head showed brain stem bleed. Transferred to NSICU for further management.  (30 Sep 2024 12:55)    OVERNIGHT EVENTS: GEORGE    Hospital Course:   : transferred from [facility]. A line placed. Versed dc'd. Cy Rader at bedside, states pt has family in [place], cannot confirm medications or PMH other than stroke and MI. 250cc bolus 3% given. LR switched to NS. hydralazine 25q8 started, 3% started, switched propofol to precedex   10/1: stability CTH done. Added labetalol, started TF. Palliative consulted. ethics consulted to determine surrogate. febrile 103, pan cx sent  10/2: BD 2, GEORGE overnight. TF resumed. Desatt'd to 80s, FiO2 inc. to 50. Fentanyl given, ABG, CXR ordered. Maxxed on precedex, started on propofol for DARIEN -4 - -5. Precedex dc'd. Duonebs, mucomyst, hypertonic added. 3% dc'd. Cardene dc'd. Start vanc/CTX. Increased labetalol 200q8. MRSA negative, dc'd vanc. ETT pulled back 2cm x 2, good positioning after confirmatory chest xray. Ethics attempting to establish HCP with family. Na 159, starting FW 250q6 for range 150-155.   10/3: BD3, GEORGE o/n, neuro stable. Na elevating, FW increased to 300q6. Dc'd bowel reg for diarrhea. vEEG started. SQH 5000q8 tonight.   10/4: BD 4, albumn bolus, incr. LR to 80 2/2 incr. in Cr, LR to 10 0cc/hr for uptrending Cr. Started 7% hypersal for 48hrs and SL atropine for inline/oral thick secretions. Dc'd CTX and started ancef for MSSA in the sputum. Nephrology consulted for CKD, f/u recs. SBP 170s, given hydralazine 10mg IVP.   10/5: BD5, o/n 10mg IVP hydralazine given for SBP 170s and started on hydralazine 25q8 via OGT. 10mg IV push labetalol for SBP > 160s. RT placed for diarrhea.   10/6: BD6, o/n FW increased to 350q4 per nephrology recs. IV tylenol for temp 100.6, SBp 160s presumed uncomfortable.   10/7: BD7, overnight pancultured for temp 101.8F.   10/8: BD8. GEORGE. Cr bumped. decreased LR to 75cc/hr. Adding simethicone ATC. incr hydralazine 50mgTID. Incr labetalol 300mgTID. Na 145, decreased FWF to 250q6. Start precedex. FENa consistent with intrinsic kidney injury. Pend repeat renal US. Retaining up to 1.3L, bladder scans q6, straight cath PRN  10/9: BD 9. GEORGE overnight. Neuro stable. abd xray for distention w non-specific gas pattern, OGT to LIWS for morning. duonebs/mucomyst to q8 for improving secretions. Changed tube feeds to Jevity 1.5 20cc/hr, low rate due to abdominal distention, nepro dense and more difficult to digest. Tolerating CPAP, confirmed by ABG.   10/10: BD 10. GEORGE overnight. Neuro stable. (+) gabriel for urinary retention on bladder scan. inc TF to goal rate of 40cc/hr. family leaning toward pursuing trach/PEG. 1/2 amp for FS 81.   10/11: BD 11. GEORGE overnight. Neuro stable. Trach/PEG consults placed.   10/12: BD 12. GEORGE overnight. Neuro stable. MRI brain complete.   10/13: BD 13. Increase flomax. Hold SQH after PM dose for trach tm. IVL.   10/14: BD 14. GEORGE overnight, remains on AC/VC. Gabriel placed for urinary retention. Dc'd free water.  S/p trach with pulm. NGT placed and CXR confirmed in good position.   10/15: BD 15, GEORGE ovn. resumed feeds. spiked 101, pan cx sent.   10/16: BD 16. GEORGE ovn. Lokelma 5mg for K+ 5.4. Started vanc q 24/zosyn for empiric PNA coverage, IVF to 100/hr. PEG held for fever.   10/17: BD 17,  ordered serum osm and urine osm for am. Started sinemet for neurostimulation. Increased cardura to 0.8. Started FW 100q4, dc'd IVF. MRSA negative, dc'd vanc. NGT replaced d/t coiling.   10/18: BD 18, GEORGE overnight, neuro stable. Amantadine added for neurostim. zosyn changed to unasyn for acinetobacter baumannii, failed TOV and required SC  10/19: BD 19, GEORGE ovn. cardura 2mg added for retention. labetalol decreased 200q8, hydralazine decreased 25q8. Gabriel replaced.   10/20: BD20, GEORGE overnight. NGT dislodged, replaced. PEG tomorrow w/ gen surg, FW increased to 150q4 and labetalol decreased to 100q8, lokelma given for hyperkalemia.   10/21: BD 21. POD0 PEG placement with Gen surg. decr labetolol to 50q8, incr. cardura to 0.4, started lokelma and phoslo, dc gabriel POD0 PEG placement with Gen surg.  10/22: BD 22. Plan to start TF today via PEG. dc labetalol, Following ophtho recs. Increased apnea settings - found to be in cheyne-moe respiration. CPAP 5/5.  10/23: BD 23. hydralazine d/c'd, trach collar trial today. Rectal tube placed at 6am.  10/24: BD24, o/n lokelma held due to diarrhea. Free water 100q6 resumed. dc'd tamsulosin, amantadine. Incr'd cardura to 8mg qhs. Dc'd FW. Switched jevity to nepro. gabriel placed for high urine output. Started SL atropine for oral secretions. Dc'd free water.  10/25: BD25, o/n decreased suctioning requirements to > q4hrs, GEORGE. Cr improving, cont phoslo, lokelma held at this time. Gabriel placed yest, cont. Tolerating trach collar. Given 500cc plasmalyte bolus for ANIKA. Dc'd sinemet.   10/26: BD26, o/n resumed lokelma 5mg daily and resumed 100cc free water q6hrs. Change in neuro status with new right pupillary dilation with anisocoria (right pupil 6mm fixed and left pupil 3mm briskly reactive). Given 23.4% NaCl bullet, taken for emergent CTH showing mostly resolved pontine hemorrhage, continued brainstem hypodensity likely edema d/t hemorrhage, no new hemorrhage or infarct, no herniation, mild increase in size of left lateral ventricle. Vitals remaine stable. Na goal > 140.   10/27: BD27, o/n GEORGE.Neuro stable. Pend stepdown with airway bed.   10/28: BD 28. GEORGE overnight. Neuro stable. Miralax ordered. Gabriel removed, pending TOV.  10/29: BD 29. GEORGE o/n. Given 2L NS over 8 hrs for increased BUN/Cr ratio. Gabriel placed for frequent straight cath.   10/30: BD 30.   10/31: BD 31. GEORGE overnight. Na 149, increased free water to 200q6. 1L NS for uptrending BUN.   : BD 32. GEORGE overnight. Given 1L NS for dehydration. Na 146, increased FW to 250q6.   : BD 33 GEORGE overnight, neuro stable, given 500cc bolus for net negative status and tachycardia   11/3: BD 34, GEORGE overnight, neuro stable. Patient remains tachycardic, EKG showing sinus tachycardia, given additional 500cc NS bolus. Febrile to 101.9F, pan cultured (without UA), CXR WNL, given tylenol.   : BD 35, GEORGE overnight, neuro stable. Given 1L NS for tachycardia. sputum (+) for stenotropohomas maltophilia.   : BD 36 GEORGE overnight, neuro stable. Vancomycin dc'd. Chest PT BID. ID consulted, cont zosyn.  : BD 37. blood cx + klebsiella dc zosyn changed to cefepime, CTAP ordered, rpt blood cx sent.    : BD 38. Pending CT A/P, given 250cc bolus and starting maintenance fluids overnight. Pending CT A/P after bolus   : BD 39. CT CAP negative for infection.   : BD 40. GEORGE overnight.  11/10: BD 41. GEORGE overnight. desat to 85 on trach collar, O2 inc to 10L and 100%, O2 sat inc to 95. pt tachy to 110s, euvolemic. given tylenol. ABG and CXR ordered. spiked fever, pancultured, RVP negative. AM ABG w pO2 79, rpt w pO2 79. pt appears comfortable, satting 94%.   : BD 42. GEORGE overnight. pt became tachy to 130s, desat to 90 on 100% FiO2 and 10L. suctioned, (+) productive cough. temp 101.4, given 1g IV tylenol and 500cc NS bolus for euvolemia. fever and HR downtrending. LE dopplers negative for dvt  : BD 43, GEORGE ovn, fever and HR downtrending, satting 97% 70% FIO2  : BD 44, GEORGE ovn. started standing tylenol x24 hours for tachycardia. desat to 80s, o2 increased. CXR stable, pending CTA PE protocol.   : BD 45, GEORGE overnight, neuro stable. resp therapy dec FiO2 to 70%.   11/15: BD 46, GEORGE overnight, neuro stable.  Rapid called for desaturation 30s, tachycardic 140s. Patient bagged, 100% fio2, heavily suctioned. CXR/POCUS unremarkable. ABG c/w desaturation. WBC 14.71. Afebrile. O2 improved to 90s and patient upgraded to ICU. ABG paO2 30s improved to 89 on vent. IV Tylenol x 1, sputum sent. Start protonix while o-n vent.   : BD 47. POCUS showed collapsable IVCF, given 1L bolus. Vanco/Cefpime added empriic for PNA, NGT feeds restarted. MRSA swab neg, Vanc DC'd.   : BD 48. GEORGE overnight. 1l bolus for tachycardia. Spiked to 101, cultured. 500cc bolus for tachycardia, tachy to 148 given 25mcg fentanyl, 250cc albumin, 1.5L bolus. 5 IV lopressor with response HR to 100s. +Stenotrophomonas on sputum cx.   : BD 49. GEORGE overnight. Consulted ID, cefepime switched to bactrim x7days. Started hydrochlorothiazine 12.5mg daily.  : BD 50. Tachy 120s, given tylenol and 500cc NS. Tolerating 5/5, switched to TCx. Holding phos binder. D/c Bactrim. D/c gabriel, f/u TOV. Dc'd PPI.   : BD 51. GEORGE ovn. 1600 satting low 90s, mildly tachy to 110s, afebrile, RR wnl. O2 improved to mid 90s while inc O2 to 100% on TCx. CPAP 5/5 placed back on.  : BD 52, GEORGE ovn, tolerating CPAP 5/5. Switch to trach collar during the day if tolerating well. HCTZ held for Cr bump, straight cath frequence increased to q4  : BD 53, GEORGE ovn. Resumed phoslo. Gabriel placed. Resumed HCTZ.   : BD 54. Holding tylenol in setting of possible fever, will require pan cx if febrile. Cr improved today. Cont CPAP. Bowel regimen held i/s/o diarrhea. FOBT negative.  : BD 55. GEORGE overnight. Neuro stable. HCTZ dc'ed, started lisinopril 5. Lokelma dc'ed for K 3.7.   : BD 56, GEORGE overnight. Neuro stable. dc'd lisinopril 5mg. Gabriel dc'd. TOV. 1545 noted to be hypotensive, MAP 50, in supine position on chair, HR 60s, afebrile, O2 96%. Given 1L cc NS bolus, placed back on bed in reverse trendelenberg, improved to map of 66. Neostick at bedside. Vitals check q1h. Dc'ed amlodipine. Failed TOV, bladder scan q6, sc prn. Added back Senna.   : BD 57, GEORGE overnight. Neuro stable. Dc'd phoslo.   : BD 58, GEORGE overnight. Neuro stable.    : BD 59. Gabriel replaced.   : GEORGE.  : GEORGE, neuro stable.   : BD 62, GEORGE overnight  : BD 63, GEORGE overnight.; Given 1L bolus of LR for uptrending BUN/Cr.  12/3: BD 64. Reinstated eye gtt/moisture chamber given increased Rt eye injection  : BD 65. GEORGE overnight. Attempted to speak with ophtho regarding eyelid weight/closure but no answer, full mailbox.   : BD 66, GEORGE overnight, bowel regimen increased and had BM.   : BD 67, GEORGE overnight, neuro stable.  : GEORGE overnight, neuro stable. /110s, given x1 hydralazine 10 mg IVP. Restarted home amlodipine 5mg.  : GEORGE. OOB to chair.     : GEORGE, mucomyst added for thick secretions, simethicone for abd distension, abd xray with stool burden, increased bowel regimen.   : GEORGE overnight.   : GEORGE overnight.   : GEORGE overnight  12/15: o/n Patient became tachycardic HR 120s and 10 minutes later O2 sat dropped as low as 89%. Patient suctioned without improvement in O2 sat and tube feeds found in suction catheter. TFs held.  STAT CXR ordered. STAT labs sent. Respiratory therapist called to bedside and patient trach connected to ventilator. After connection to ventilator and further suctioning O2 sat improved to 97% but patient HR remains 120-130s. Upgraded to NSICU for further management. Vancomycin and zosyn started. CTA  chest PE protocol and CTH ordered. Blood cultures sent.given 500cc bolus, rpt ABG sent pO2 243, CTH and CTA chest done. FS while NPO, FiO2 dec 50 pending ABG. sputum cx positive for few GPC and GNR.   : GEORGE overnight, restarted amlodipine, troponin 75   : GEORGE overnight, neuro stable. Attempted CPAP this morning, did not tolerate and back on full vent support. Dc'd Vanc. Tachycardia to 140s, noted extremities to be twitching along with jaw twitching. Given 2g of Keppra, total 4mg ativan and placed on EEG, full set of labs and lactate negative. Resumed trickle feeds at 20cc/hr. Given 250cc albumin for tachycardia.   : GEORGE overnight. Neuro stable. CTH stable. EEG negative and dc'd.   : GEORGE overnight. neuro stable. SIMV most of day, AC/VC at night.   : GEORGE overnight, neuro stable. remains on VC/AC  : GEORGE ovn. 1L bolus for tachy to 120s-130s.   : GEORGE ovn. Tachy to 120s, given tylenol and IVF. 2mg IV ativan given for L jaw twitching. Sx resolved for 3 minutes and started again. Epilepsy contacted. Given 2mg IV ativan. Continued twitching. Increased keppra to 1500mg BID, 2mg ativan given. Propranolol started for refractory tachycardia. vEEG ordered. albumin given. POCUS performed, no b lines. Started on fosphenytoin, loaded w 20mg/kg then 100mg TID. febrile, pancx, started cefepime 2g q8, vancomycin  : GEORGE ovn. +focal motor seizures on eeg. ID consulted. Vancomycin dc'ed as per ID.  : GEORGE ovn, neuro stable. Baclofen 5 mg q12 started for hiccups. Na 129 from 134. Urine lytes c/w SIADH. Given 250cc 3% bolus. CT chest for infection w/u - f/u read. Repeat Na 136. UA negative  : GEORGE ovn.   : GEORGE ovn    Vital Signs Last 24 Hrs  T(C): 37.5 (25 Dec 2024 22:03), Max: 37.5 (25 Dec 2024 22:03)  T(F): 99.5 (25 Dec 2024 22:03), Max: 99.5 (25 Dec 2024 22:03)  HR: 84 (25 Dec 2024 23:35) (72 - 84)  BP: 117/77 (25 Dec 2024 23:35) (109/72 - 117/80)  BP(mean): 93 (25 Dec 2024 23:35) (86 - 95)  RR: 15 (25 Dec 2024 23:35) (14 - 18)  SpO2: 98% (25 Dec 2024 23:35) (98% - 99%)    Parameters below as of 25 Dec 2024 23:35  Patient On (Oxygen Delivery Method): ventilator    O2 Concentration (%): 40    I&O's Summary    24 Dec 2024 07:01  -  25 Dec 2024 07:00  --------------------------------------------------------  IN: 1330 mL / OUT: 1950 mL / NET: -620 mL    25 Dec 2024 07:01  -  26 Dec 2024 00:09  --------------------------------------------------------  IN: 2130 mL / OUT: 1300 mL / NET: 830 mL        PHYSICAL EXAM:  GEN: laying in bed, NAD  NEURO: AOx0. does not FC, OE spont, face symmetric. PERRL. LUE 0/5, LLE w/d, RUE w/d, RLE trace w/d  CV: RRR +S1/S2  PULM: CTAB  GI: Abd soft, NT/ND  EXT: ext warm, dry, nontender    TUBES/LINES:  [x] Gabriel  [] Lumbar Drain  [] Wound Drains  [] Others      DIET:  [] NPO  [] Mechanical  [x] Tube feeds    LABS:                        10.1   8.11  )-----------( 215      ( 25 Dec 2024 05:30 )             32.1     12-25    136  |  102  |  36[H]  ----------------------------<  113[H]  3.8   |  23  |  1.28    Ca    8.8      25 Dec 2024 05:30  Phos  3.8     12-25  Mg     2.0     12-25    TPro  6.7  /  Alb  3.1[L]  /  TBili  0.2  /  DBili  x   /  AST  26  /  ALT  50[H]  /  AlkPhos  111  12-24      Urinalysis Basic - ( 25 Dec 2024 20:16 )    Color: Yellow / Appearance: Clear / S.017 / pH: x  Gluc: x / Ketone: Negative mg/dL  / Bili: Negative / Urobili: 0.2 mg/dL   Blood: x / Protein: 100 mg/dL / Nitrite: Negative   Leuk Esterase: Negative / RBC: 0 /HPF / WBC 1 /HPF   Sq Epi: x / Non Sq Epi: 1 /HPF / Bacteria: Negative /HPF          CAPILLARY BLOOD GLUCOSE          Drug Levels: [] N/A  Phenytoin Level, Serum: 12.2 ug/mL ( @ 05:30)    CSF Analysis: [] N/A      Allergies    Allergy Status Unknown    Intolerances      MEDICATIONS:  Antibiotics:  cefepime   IVPB 2000 milliGRAM(s) IV Intermittent every 8 hours    Neuro:  acetaminophen   Oral Liquid .. 650 milliGRAM(s) Oral every 6 hours PRN  baclofen 5 milliGRAM(s) Oral every 12 hours  fosphenytoin IVPB 100 milliGRAM(s) PE IV Intermittent three times a day  levETIRAcetam 1500 milliGRAM(s) Oral two times a day  oxyCODONE    Solution 5 milliGRAM(s) Oral every 4 hours PRN    Anticoagulation:  heparin   Injectable 5000 Unit(s) SubCutaneous every 8 hours    OTHER:  acetylcysteine 10%  Inhalation 4 milliLiter(s) Inhalation every 6 hours  albuterol/ipratropium for Nebulization 3 milliLiter(s) Nebulizer every 6 hours  amLODIPine   Tablet 5 milliGRAM(s) Oral daily  artificial tears (preservative free) Ophthalmic Solution 1 Drop(s) Right EYE every 4 hours  chlorhexidine 0.12% Liquid 15 milliLiter(s) Oral Mucosa every 12 hours  chlorhexidine 2% Cloths 1 Application(s) Topical <User Schedule>  doxazosin 8 milliGRAM(s) Oral at bedtime  erythromycin   Ointment 1 Application(s) Right EYE at bedtime  fluticasone propionate 50 MICROgram(s)/spray Nasal Spray 1 Spray(s) Both Nostrils two times a day  ofloxacin 0.3% Solution 1 Drop(s) Right EYE four times a day  pantoprazole  Injectable 40 milliGRAM(s) IV Push daily  petrolatum Ophthalmic Ointment 1 Application(s) Both EYES two times a day  propranolol 10 milliGRAM(s) Oral every 8 hours  sodium chloride 0.65% Nasal 1 Spray(s) Both Nostrils two times a day    IVF:    CULTURES:  Culture Results:   No growth at 48 Hours ( @ 17:45)  Culture Results:   No growth at 48 Hours ( @ 17:45)    RADIOLOGY & ADDITIONAL TESTS:      ASSESSMENT:  45 y/o PMH ?stroke/MI present to [facility] after collapsing at work. Decorticate posturing, vomiting, intubated for airway protection. 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 Transferred to [facility] for further management. s/p trach 10/14. s/p peg 10/21. Re-upgrade to ICU 2/2 desaturation event and suctioning requirements 11/15. Re-upgrade to NSICU 12/15 2/2 desaturation and tachycardia.      AMS    Handoff    MEWS Score    Acute myocardial infarction    CVA (cerebral vascular accident)    Intracerebral hemorrhage of brain stem    Brainstem stroke    Brain stem stroke syndrome    Brain stem hemorrhage    Brain stem stroke syndrome    Hemorrhagic stroke    Brainstem stroke    Encephalopathy acute    Functional quadriplegia    Advanced care planning/counseling discussion    Encounter for palliative care    Pontine hemorrhage    Neurogenic dysphagia    Chronic respiratory failure    Acute kidney injury superimposed on CKD    Acute urinary retention    Hypertensive emergency    Sepsis, unspecified organism    Sepsis    Gram-negative bacteremia    Percutaneous tracheal puncture    Altered mental status examination    EGD, with PEG    AMS    Room Service Assist    [email or web address]        PLAN:  Neuro:  - neuro/vitals q4h  - pain control: tylenol prn  - seizure tx: keppra 1500mg BID, fosphenytoin 100mg TID  - vEEG (10/3-) negative, (10/17-10/19) negative, (-) negative, (- ) +focal motor seizures not correlating w/ EEG  - epilepsy following  - CTH : enlarged pontine hemorrhage, CTH 10/3: stable, CTH 10/25: mostly resolved pontine hemorrhage, CTH 12/15: R mastoid air cell opacification; acute otitis media vs sterile effusion, CTH : stable.  - MRI brain 10/12: parenchymal hemorrhage, acute/subacute R cerebellar stroke    - stroke core measures, stroke neuro signed off    CV:  - -160  - tachycardia: propranolol 10mg q8  - HTN: amlodipine 5mg, hold lisinopril 5mg   - echo () EF 75%, repeat : 57%    Resp:  - trach to vent, AC/VC 40/400/14/6  - CTA PE protocol 12/15 negative   - Secretions: duonebs/mucomyst/chest PT q6h   - CT Chest 12/24 for infectious w/u: b/l LL atelectasis and opacities     GI:  - TF via PEG (placed 10/21 by gen surg)  - bowel regimen held for loose stool, last BM   - baclofen 5q12 for hiccups     Renal:  - IVL  - Urinary retenion: Gabriel replaced 12/15  - CKD: trend BUN/Cr  - renal US 10/1: echogenicity c/w chronic med renal dz, repeat 10/8: inc renal echogeicity, c/f medical renal disease w increased hydro     Endo:  - A1c 5.4    Heme:  - DVT ppx: SCDs, SQH 5000u q8h   - LE dopplers negative     ID:  - last pan cx   - empiric PNA: cefepime ( - ), s/p vanc (-), s/p zosyn (12/15-), s/p vanc (12/15-)  - s/p Stenotrophomonas maltophilia PNA: s/p Bactrim (-) s/p Cefepime (-)  - 11/3, (+) sputum for stenotrophomas maltophlia, blood cx (+) klebsiella, cefepime 2gq12 ( - )   - empiric tx: s/p zosyn (11/3-) , s/p vanc  (11/3-)  - S/p Ancef (10/4-10/14) for PNA, and s/p Unasyn (10/18-10/23) +actinobacter baumanii     MISC:  - ophtho consult for keratitis  - erythromycin ointment R eye q4hrs, ofloxacin ointment R eye QID, artificial tears R eye q2hrs, moisture chamber at bedtime    Dispo: SDU status, full code, pending placement     D/w Dr. D'Amico     Assessment:  Present when checked    []  GCS  E   V  M     Heart Failure: []Acute, [] acute on chronic , []chronic  Heart Failure:  [] Diastolic (HFpEF), [] Systolic (HFrEF), []Combined (HFpEF and HFrEF), [] RHF, [] Pulm HTN, [] Other    [] ANIKA, [] ATN, [] AIN, [] other  [] CKD1, [] CKD2, [] CKD 3, [] CKD 4, [] CKD 5, []ESRD    Encephalopathy: [] Metabolic, [] Hepatic, [] toxic, [] Neurological, [] Other    Abnormal Nurtitional Status: [] malnurtition (see nutrition note), [ ]underweight: BMI < 19, [] morbid obesity: BMI >40, [] Cachexia    [] Sepsis  [] hypovolemic shock,[] cardiogenic shock, [] hemorrhagic shock, [] neuogenic shock  [] Acute Respiratory Failure  []Cerebral edema, [] Brain compression/ herniation,   [] Functional quadriplegia  [] Acute blood loss anemia

## 2024-12-26 NOTE — PROGRESS NOTE ADULT - SUBJECTIVE AND OBJECTIVE BOX
INTERVAL HPI/OVERNIGHT EVENTS:  Tm 99.5    ROS:  Unobtainable - not interacting      ANTIBIOTICS/RELEVANT:    Cefepime 2 g IV q8h (11/6-11/12; 11/16-11/18;  12/22-present)    Amp-sulbactam 10/18-10/23  Cefazolin 10/4-10/14  Ceftriaxone 10/2-10/4  Pip-tazo 10/16-10/18, 11/3-11/6, 12/15-12/17  TMP/SX 11/18-11/19  Vancomycin 1250 mg IV q12h (10/16, 11/3-11/5, 12/22-present)          Vital Signs Last 24 Hrs  T(C): 36.5 (26 Dec 2024 16:57), Max: 37.5 (25 Dec 2024 22:03)  T(F): 97.7 (26 Dec 2024 16:57), Max: 99.5 (25 Dec 2024 22:03)  HR: 72 (26 Dec 2024 16:51) (72 - 84)  BP: 129/91 (26 Dec 2024 16:40) (111/75 - 134/92)  BP(mean): 106 (26 Dec 2024 16:40) (88 - 108)  RR: 16 (26 Dec 2024 16:51) (14 - 17)  SpO2: 96% (26 Dec 2024 16:51) (95% - 99%)    Parameters below as of 26 Dec 2024 16:51  Patient On (Oxygen Delivery Method): ventilator    O2 Concentration (%): 40    PHYSICAL EXAM:  Constitutional:  Awake, startle on L, not following commands  HEENT:  EEG leads in place, sclerae anicteric, conjunctivae clear, PERRL.  No nasal exudate or sinus tenderness;  Unable to visualize pharynx  Neck:  Supple, +trach - site clean  Respiratory:  Clear bilaterally anteriorly  Cardiovascular:  RRR, S1S2, no murmur appreciated  Gastrointestinal:  PEG site clean, normoactive BS, soft, NT, no masses, guarding or rebound.  No HSM  :  Vuong catheter   Extremities:  No edema      LABS:                        10.7   9.52  )-----------( 222      ( 26 Dec 2024 05:30 )             34.6         12-26    136  |  103  |  40[H]  ----------------------------<  107[H]  4.0   |  22  |  1.25    Ca    9.1      26 Dec 2024 05:30  Phos  3.4     12-26  Mg     2.1     12-26        Urinalysis Basic - ( 26 Dec 2024 05:30 )    Color: x / Appearance: x / SG: x / pH: x  Gluc: 107 mg/dL / Ketone: x  / Bili: x / Urobili: x   Blood: x / Protein: x / Nitrite: x   Leuk Esterase: x / RBC: x / WBC x   Sq Epi: x / Non Sq Epi: x / Bacteria: x        MICROBIOLOGY:  Culture Results:   Numerous Klebsiella aerogenes (Previously Enterobacter)  Numerous Stenotrophomonas maltophilia  Commensal iram consistent with body site (12-25-24 @ 20:05)        RADIOLOGY & ADDITIONAL STUDIES:

## 2024-12-26 NOTE — PROGRESS NOTE ADULT - PROBLEM SELECTOR PLAN 1
.  -PPS 10%  -frequent repositioning and skin care  -patient at risk for decubitus ulcers  -supportive care

## 2024-12-27 LAB
ANION GAP SERPL CALC-SCNC: 12 MMOL/L — SIGNIFICANT CHANGE UP (ref 5–17)
BUN SERPL-MCNC: 42 MG/DL — HIGH (ref 7–23)
CALCIUM SERPL-MCNC: 9.4 MG/DL — SIGNIFICANT CHANGE UP (ref 8.4–10.5)
CHLORIDE SERPL-SCNC: 105 MMOL/L — SIGNIFICANT CHANGE UP (ref 96–108)
CO2 SERPL-SCNC: 21 MMOL/L — LOW (ref 22–31)
CREAT SERPL-MCNC: 1.23 MG/DL — SIGNIFICANT CHANGE UP (ref 0.5–1.3)
EGFR: 73 ML/MIN/1.73M2 — SIGNIFICANT CHANGE UP
EGFR: 73 ML/MIN/1.73M2 — SIGNIFICANT CHANGE UP
GLUCOSE SERPL-MCNC: 102 MG/DL — HIGH (ref 70–99)
HCT VFR BLD CALC: 34.9 % — LOW (ref 39–50)
HGB BLD-MCNC: 11.1 G/DL — LOW (ref 13–17)
LEVETIRACETAM SERPL-MCNC: 45.9 UG/ML — HIGH (ref 10–40)
MAGNESIUM SERPL-MCNC: 2.1 MG/DL — SIGNIFICANT CHANGE UP (ref 1.6–2.6)
MCHC RBC-ENTMCNC: 30.6 PG — SIGNIFICANT CHANGE UP (ref 27–34)
MCHC RBC-ENTMCNC: 31.8 G/DL — LOW (ref 32–36)
MCV RBC AUTO: 96.1 FL — SIGNIFICANT CHANGE UP (ref 80–100)
NRBC # BLD: 0 /100 WBCS — SIGNIFICANT CHANGE UP (ref 0–0)
NRBC BLD-RTO: 0 /100 WBCS — SIGNIFICANT CHANGE UP (ref 0–0)
PHOSPHATE SERPL-MCNC: 3.3 MG/DL — SIGNIFICANT CHANGE UP (ref 2.5–4.5)
PLATELET # BLD AUTO: 216 K/UL — SIGNIFICANT CHANGE UP (ref 150–400)
POTASSIUM SERPL-MCNC: 3.7 MMOL/L — SIGNIFICANT CHANGE UP (ref 3.5–5.3)
POTASSIUM SERPL-SCNC: 3.7 MMOL/L — SIGNIFICANT CHANGE UP (ref 3.5–5.3)
RBC # BLD: 3.63 M/UL — LOW (ref 4.2–5.8)
RBC # FLD: 14.5 % — SIGNIFICANT CHANGE UP (ref 10.3–14.5)
SODIUM SERPL-SCNC: 138 MMOL/L — SIGNIFICANT CHANGE UP (ref 135–145)
WBC # BLD: 7.38 K/UL — SIGNIFICANT CHANGE UP (ref 3.8–10.5)
WBC # FLD AUTO: 7.38 K/UL — SIGNIFICANT CHANGE UP (ref 3.8–10.5)

## 2024-12-27 PROCEDURE — 99232 SBSQ HOSP IP/OBS MODERATE 35: CPT

## 2024-12-27 PROCEDURE — 99231 SBSQ HOSP IP/OBS SF/LOW 25: CPT

## 2024-12-27 PROCEDURE — 71045 X-RAY EXAM CHEST 1 VIEW: CPT | Mod: 26

## 2024-12-27 RX ADMIN — CEFEPIME 100 MILLIGRAM(S): 2 INJECTION, POWDER, FOR SOLUTION INTRAVENOUS at 13:36

## 2024-12-27 RX ADMIN — IPRATROPIUM BROMIDE AND ALBUTEROL SULFATE 3 MILLILITER(S): .5; 2.5 SOLUTION RESPIRATORY (INHALATION) at 17:49

## 2024-12-27 RX ADMIN — IPRATROPIUM BROMIDE AND ALBUTEROL SULFATE 3 MILLILITER(S): .5; 2.5 SOLUTION RESPIRATORY (INHALATION) at 12:14

## 2024-12-27 RX ADMIN — Medication 1 DROP(S): at 13:37

## 2024-12-27 RX ADMIN — AMLODIPINE BESYLATE 5 MILLIGRAM(S): 10 TABLET ORAL at 05:18

## 2024-12-27 RX ADMIN — Medication 15 MILLILITER(S): at 05:18

## 2024-12-27 RX ADMIN — Medication 1 DROP(S): at 10:14

## 2024-12-27 RX ADMIN — ERYTHROMYCIN 1 APPLICATION(S): 5 OINTMENT OPHTHALMIC at 21:29

## 2024-12-27 RX ADMIN — ACETYLCYSTEINE 4 MILLILITER(S): 200 INHALANT RESPIRATORY (INHALATION) at 17:49

## 2024-12-27 RX ADMIN — OFLOXACIN 1 DROP(S): 3 SOLUTION OPHTHALMIC at 23:48

## 2024-12-27 RX ADMIN — ACETYLCYSTEINE 4 MILLILITER(S): 200 INHALANT RESPIRATORY (INHALATION) at 05:20

## 2024-12-27 RX ADMIN — Medication 1 APPLICATION(S): at 17:50

## 2024-12-27 RX ADMIN — Medication 1 DROP(S): at 21:28

## 2024-12-27 RX ADMIN — LEVETIRACETAM 1500 MILLIGRAM(S): 10 INJECTION, SOLUTION INTRAVENOUS at 05:18

## 2024-12-27 RX ADMIN — FLUTICASONE PROPIONATE 1 SPRAY(S): 50 SPRAY, METERED NASAL at 05:16

## 2024-12-27 RX ADMIN — OFLOXACIN 1 DROP(S): 3 SOLUTION OPHTHALMIC at 12:07

## 2024-12-27 RX ADMIN — Medication 1 SPRAY(S): at 05:15

## 2024-12-27 RX ADMIN — CEFEPIME 100 MILLIGRAM(S): 2 INJECTION, POWDER, FOR SOLUTION INTRAVENOUS at 05:19

## 2024-12-27 RX ADMIN — HEPARIN SODIUM 5000 UNIT(S): 1000 INJECTION INTRAVENOUS; SUBCUTANEOUS at 05:16

## 2024-12-27 RX ADMIN — FOSPHENYTOIN SODIUM 104 MILLIGRAM(S) PE: 50 INJECTION INTRAMUSCULAR; INTRAVENOUS at 21:29

## 2024-12-27 RX ADMIN — Medication 1 DROP(S): at 17:49

## 2024-12-27 RX ADMIN — BACLOFEN 5 MILLIGRAM(S): 10 INJECTION INTRATHECAL at 17:50

## 2024-12-27 RX ADMIN — Medication 1 DROP(S): at 01:14

## 2024-12-27 RX ADMIN — IPRATROPIUM BROMIDE AND ALBUTEROL SULFATE 3 MILLILITER(S): .5; 2.5 SOLUTION RESPIRATORY (INHALATION) at 00:22

## 2024-12-27 RX ADMIN — ACETYLCYSTEINE 4 MILLILITER(S): 200 INHALANT RESPIRATORY (INHALATION) at 23:47

## 2024-12-27 RX ADMIN — Medication 1 APPLICATION(S): at 05:17

## 2024-12-27 RX ADMIN — Medication 40 MILLIEQUIVALENT(S): at 12:07

## 2024-12-27 RX ADMIN — BACLOFEN 5 MILLIGRAM(S): 10 INJECTION INTRATHECAL at 05:13

## 2024-12-27 RX ADMIN — IPRATROPIUM BROMIDE AND ALBUTEROL SULFATE 3 MILLILITER(S): .5; 2.5 SOLUTION RESPIRATORY (INHALATION) at 23:47

## 2024-12-27 RX ADMIN — OFLOXACIN 1 DROP(S): 3 SOLUTION OPHTHALMIC at 05:14

## 2024-12-27 RX ADMIN — OFLOXACIN 1 DROP(S): 3 SOLUTION OPHTHALMIC at 00:22

## 2024-12-27 RX ADMIN — FOSPHENYTOIN SODIUM 104 MILLIGRAM(S) PE: 50 INJECTION INTRAMUSCULAR; INTRAVENOUS at 13:38

## 2024-12-27 RX ADMIN — Medication 1 APPLICATION(S): at 05:15

## 2024-12-27 RX ADMIN — Medication 1 SPRAY(S): at 17:50

## 2024-12-27 RX ADMIN — Medication 40 MILLIGRAM(S): at 12:11

## 2024-12-27 RX ADMIN — ACETYLCYSTEINE 4 MILLILITER(S): 200 INHALANT RESPIRATORY (INHALATION) at 00:22

## 2024-12-27 RX ADMIN — OFLOXACIN 1 DROP(S): 3 SOLUTION OPHTHALMIC at 17:51

## 2024-12-27 RX ADMIN — ACETYLCYSTEINE 4 MILLILITER(S): 200 INHALANT RESPIRATORY (INHALATION) at 12:14

## 2024-12-27 RX ADMIN — HEPARIN SODIUM 5000 UNIT(S): 1000 INJECTION INTRAVENOUS; SUBCUTANEOUS at 21:28

## 2024-12-27 RX ADMIN — Medication 15 MILLILITER(S): at 17:49

## 2024-12-27 RX ADMIN — FLUTICASONE PROPIONATE 1 SPRAY(S): 50 SPRAY, METERED NASAL at 17:50

## 2024-12-27 RX ADMIN — HEPARIN SODIUM 5000 UNIT(S): 1000 INJECTION INTRAVENOUS; SUBCUTANEOUS at 13:36

## 2024-12-27 RX ADMIN — LEVETIRACETAM 1500 MILLIGRAM(S): 10 INJECTION, SOLUTION INTRAVENOUS at 17:51

## 2024-12-27 RX ADMIN — Medication 1 DROP(S): at 05:14

## 2024-12-27 RX ADMIN — IPRATROPIUM BROMIDE AND ALBUTEROL SULFATE 3 MILLILITER(S): .5; 2.5 SOLUTION RESPIRATORY (INHALATION) at 05:20

## 2024-12-27 RX ADMIN — FOSPHENYTOIN SODIUM 104 MILLIGRAM(S) PE: 50 INJECTION INTRAMUSCULAR; INTRAVENOUS at 05:19

## 2024-12-27 RX ADMIN — DOXAZOSIN MESYLATE 8 MILLIGRAM(S): 8 TABLET ORAL at 21:28

## 2024-12-27 NOTE — PROGRESS NOTE ADULT - SUBJECTIVE AND OBJECTIVE BOX
HPI:  Unknown age male, unknown past medical history (reported stroke and MI by coworkers) presented to [facility] with AMS, Pt was working at [employer] when he was found down by coworkers. EMS called and pt brought to [facility] ED. Intubated, sedated, started on cardene for SBPs in 200s. CT head showed brain stem bleed. Transferred to NSICU for further management.  (30 Sep 2024 12:55)      HOSPITAL COURSE:  9/30: transferred from [facility]. A line placed. Versed dc'd. Cy Rader at bedside, states pt has family in [place], cannot confirm medications or PMH other than stroke and MI. 250cc bolus 3% given. LR switched to NS. hydralazine 25q8 started, 3% started, switched propofol to precedex   10/1: stability CTH done. Added labetalol, started TF. Palliative consulted. ethics consulted to determine surrogate. febrile 103, pan cx sent  10/2: BD 2, GEORGE overnight. TF resumed. Desatt'd to 80s, FiO2 inc. to 50. Fentanyl given, ABG, CXR ordered. Maxxed on precedex, started on propofol for DARIEN -4 - -5. Precedex dc'd. Duonebs, mucomyst, hypertonic added. 3% dc'd. Cardene dc'd. Start vanc/CTX. Increased labetalol 200q8. MRSA negative, dc'd vanc. ETT pulled back 2cm x 2, good positioning after confirmatory chest xray. Ethics attempting to establish HCP with family. Na 159, starting FW 250q6 for range 150-155.   10/3: BD3, GEORGE o/n, neuro stable. Na elevating, FW increased to 300q6. Dc'd bowel reg for diarrhea. vEEG started. SQH 5000q8 tonight.   10/4: BD 4, albumn bolus, incr. LR to 80 2/2 incr. in Cr, LR to 10 0cc/hr for uptrending Cr. Started 7% hypersal for 48hrs and SL atropine for inline/oral thick secretions. Dc'd CTX and started ancef for MSSA in the sputum. Nephrology consulted for CKD, f/u recs. SBP 170s, given hydralazine 10mg IVP.   10/5: BD5, o/n 10mg IVP hydralazine given for SBP 170s and started on hydralazine 25q8 via OGT. 10mg IV push labetalol for SBP > 160s. RT placed for diarrhea.   10/6: BD6, o/n FW increased to 350q4 per nephrology recs. IV tylenol for temp 100.6, SBp 160s presumed uncomfortable.   10/7: BD7, overnight pancultured for temp 101.8F.   10/8: BD8. GEORGE. Cr bumped. decreased LR to 75cc/hr. Adding simethicone ATC. incr hydralazine 50mgTID. Incr labetalol 300mgTID. Na 145, decreased FWF to 250q6. Start precedex. FENa consistent with intrinsic kidney injury. Pend repeat renal US. Retaining up to 1.3L, bladder scans q6, straight cath PRN  10/9: BD 9. GEORGE overnight. Neuro stable. abd xray for distention w non-specific gas pattern, OGT to LIWS for morning. duonebs/mucomyst to q8 for improving secretions. Changed tube feeds to Jevity 1.5 20cc/hr, low rate due to abdominal distention, nepro dense and more difficult to digest. Tolerating CPAP, confirmed by ABG.   10/10: BD 10. GEORGE overnight. Neuro stable. (+) gabriel for urinary retention on bladder scan. inc TF to goal rate of 40cc/hr. family leaning toward pursuing trach/PEG. 1/2 amp for FS 81.   10/11: BD 11. GEORGE overnight. Neuro stable. Trach/PEG consults placed.   10/12: BD 12. GEORGE overnight. Neuro stable. MRI brain complete.   10/13: BD 13. Increase flomax. Hold SQH after PM dose for trach tm. IVL.   10/14: BD 14. GEORGE overnight, remains on AC/VC. Gabriel placed for urinary retention. Dc'd free water.  S/p trach with pulm. NGT placed and CXR confirmed in good position.   10/15: BD 15, GEORGE ovn. resumed feeds. spiked 101, pan cx sent.   10/16: BD 16. GEORGE ovn. Lokelma 5mg for K+ 5.4. Started vanc q 24/zosyn for empiric PNA coverage, IVF to 100/hr. PEG held for fever.   10/17: BD 17,  ordered serum osm and urine osm for am. Started sinemet for neurostimulation. Increased cardura to 0.8. Started FW 100q4, dc'd IVF. MRSA negative, dc'd vanc. NGT replaced d/t coiling.   10/18: BD 18, GEORGE overnight, neuro stable. Amantadine added for neurostim. zosyn changed to unasyn for acinetobacter baumannii, failed TOV and required SC  10/19: BD 19, GEORGE ovn. cardura 2mg added for retention. labetalol decreased 200q8, hydralazine decreased 25q8. Gabriel replaced.   10/20: BD20, GEORGE overnight. NGT dislodged, replaced. PEG tomorrow w/ gen surg, FW increased to 150q4 and labetalol decreased to 100q8, lokelma given for hyperkalemia.   10/21: BD 21. POD0 PEG placement with Gen surg. decr labetolol to 50q8, incr. cardura to 0.4, started lokelma and phoslo, dc gabriel POD0 PEG placement with Gen surg.  10/22: BD 22. Plan to start TF today via PEG. dc labetalol, Following ophtho recs. Increased apnea settings - found to be in cheyne-moe respiration. CPAP 5/5.  10/23: BD 23. hydralazine d/c'd, trach collar trial today. Rectal tube placed at 6am.  10/24: BD24, o/n lokelma held due to diarrhea. Free water 100q6 resumed. dc'd tamsulosin, amantadine. Incr'd cardura to 8mg qhs. Dc'd FW. Switched jevity to nepro. gabriel placed for high urine output. Started SL atropine for oral secretions. Dc'd free water.  10/25: BD25, o/n decreased suctioning requirements to > q4hrs, GEORGE. Cr improving, cont phoslo, lokelma held at this time. Gabriel placed yest, cont. Tolerating trach collar. Given 500cc plasmalyte bolus for ANIKA. Dc'd sinemet.   10/26: BD26, o/n resumed lokelma 5mg daily and resumed 100cc free water q6hrs. Change in neuro status with new right pupillary dilation with anisocoria (right pupil 6mm fixed and left pupil 3mm briskly reactive). Given 23.4% NaCl bullet, taken for emergent CTH showing mostly resolved pontine hemorrhage, continued brainstem hypodensity likely edema d/t hemorrhage, no new hemorrhage or infarct, no herniation, mild increase in size of left lateral ventricle. Vitals remaine stable. Na goal > 140.   10/27: BD27, o/n GEORGE.Neuro stable. Pend stepdown with airway bed.   10/28: BD 28. GEORGE overnight. Neuro stable. Miralax ordered. Gabriel removed, pending TOV.  10/29: BD 29. GEORGE o/n. Given 2L NS over 8 hrs for increased BUN/Cr ratio. Gabriel placed for frequent straight cath.   10/30: BD 30.   10/31: BD 31. GEORGE overnight. Na 149, increased free water to 200q6. 1L NS for uptrending BUN.   11/1: BD 32. GEORGE overnight. Given 1L NS for dehydration. Na 146, increased FW to 250q6.   11/2: BD 33 GEORGE overnight, neuro stable, given 500cc bolus for net negative status and tachycardia   11/3: BD 34, GEORGE overnight, neuro stable. Patient remains tachycardic, EKG showing sinus tachycardia, given additional 500cc NS bolus. Febrile to 101.9F, pan cultured (without UA), CXR WNL, given tylenol.   11/4: BD 35, GEORGE overnight, neuro stable. Given 1L NS for tachycardia. sputum (+) for stenotropohomas maltophilia.   11/5: BD 36 GEORGE overnight, neuro stable. Vancomycin dc'd. Chest PT BID. ID consulted, cont zosyn.  11/6: BD 37. blood cx + klebsiella dc zosyn changed to cefepime, CTAP ordered, rpt blood cx sent.    11/7: BD 38. Pending CT A/P, given 250cc bolus and starting maintenance fluids overnight. Pending CT A/P after bolus   11/8: BD 39. CT CAP negative for infection.   11/9: BD 40. GEORGE overnight.  11/10: BD 41. GEORGE overnight. desat to 85 on trach collar, O2 inc to 10L and 100%, O2 sat inc to 95. pt tachy to 110s, euvolemic. given tylenol. ABG and CXR ordered. spiked fever, pancultured, RVP negative. AM ABG w pO2 79, rpt w pO2 79. pt appears comfortable, satting 94%.   11/11: BD 42. GEORGE overnight. pt became tachy to 130s, desat to 90 on 100% FiO2 and 10L. suctioned, (+) productive cough. temp 101.4, given 1g IV tylenol and 500cc NS bolus for euvolemia. fever and HR downtrending. LE dopplers negative for dvt  11/12: BD 43, GEORGE ovn, fever and HR downtrending, satting 97% 70% FIO2  11/13: BD 44, GEORGE ovn. started standing tylenol x24 hours for tachycardia. desat to 80s, o2 increased. CXR stable, pending CTA PE protocol.   11/14: BD 45, GEORGE overnight, neuro stable. resp therapy dec FiO2 to 70%.   11/15: BD 46, GEORGE overnight, neuro stable.  Rapid called for desaturation 30s, tachycardic 140s. Patient bagged, 100% fio2, heavily suctioned. CXR/POCUS unremarkable. ABG c/w desaturation. WBC 14.71. Afebrile. O2 improved to 90s and patient upgraded to ICU. ABG paO2 30s improved to 89 on vent. IV Tylenol x 1, sputum sent. Start protonix while o-n vent.   11/16: BD 47. POCUS showed collapsable IVCF, given 1L bolus. Vanco/Cefpime added empriic for PNA, NGT feeds restarted. MRSA swab neg, Vanc DC'd.   11/17: BD 48. GEORGE overnight. 1l bolus for tachycardia. Spiked to 101, cultured. 500cc bolus for tachycardia, tachy to 148 given 25mcg fentanyl, 250cc albumin, 1.5L bolus. 5 IV lopressor with response HR to 100s. +Stenotrophomonas on sputum cx.   11/18: BD 49. GEORGE overnight. Consulted ID, cefepime switched to bactrim x7days. Started hydrochlorothiazine 12.5mg daily.  11/19: BD 50. Tachy 120s, given tylenol and 500cc NS. Tolerating 5/5, switched to TCx. Holding phos binder. D/c Bactrim. D/c gabriel, f/u TOV. Dc'd PPI.   11/20: BD 51. GEORGE ovn. 1600 satting low 90s, mildly tachy to 110s, afebrile, RR wnl. O2 improved to mid 90s while inc O2 to 100% on TCx. CPAP 5/5 placed back on.  11/21: BD 52, GEORGE ovn, tolerating CPAP 5/5. Switch to trach collar during the day if tolerating well. HCTZ held for Cr bump, straight cath frequence increased to q4  11/22: BD 53, GEORGE ovn. Resumed phoslo. Gabriel placed. Resumed HCTZ.   11/23: BD 54. Holding tylenol in setting of possible fever, will require pan cx if febrile. Cr improved today. Cont CPAP. Bowel regimen held i/s/o diarrhea. FOBT negative.  11/24: BD 55. GEORGE overnight. Neuro stable. HCTZ dc'ed, started lisinopril 5. Lokelma dc'ed for K 3.7.   11/25: BD 56, GEORGE overnight. Neuro stable. dc'd lisinopril 5mg. Gabriel dc'd. TOV. 1545 noted to be hypotensive, MAP 50, in supine position on chair, HR 60s, afebrile, O2 96%. Given 1L cc NS bolus, placed back on bed in reverse trendelenberg, improved to map of 66. Neostick at bedside. Vitals check q1h. Dc'ed amlodipine. Failed TOV, bladder scan q6, sc prn. Added back Senna.   11/26: BD 57, GEORGE overnight. Neuro stable. Dc'd phoslo.   11/27: BD 58, GEORGE overnight. Neuro stable.    11/28: BD 59. Gabriel replaced.   11/29: GEORGE.  11/30: GEORGE, neuro stable.   12/1: BD 62, GEORGE overnight  12/2: BD 63, GEORGE overnight.; Given 1L bolus of LR for uptrending BUN/Cr.  12/3: BD 64. Reinstated eye gtt/moisture chamber given increased Rt eye injection  12/4: BD 65. GEORGE overnight. Attempted to speak with ophtho regarding eyelid weight/closure but no answer, full mailbox.   12/5: BD 66, GEORGE overnight, bowel regimen increased and had BM.   12/6: BD 67, GEORGE overnight, neuro stable.  12/7: GEORGE overnight, neuro stable. /110s, given x1 hydralazine 10 mg IVP. Restarted home amlodipine 5mg.  12/8: GEORGE. OOB to chair.     12/11: GEORGE, mucomyst added for thick secretions, simethicone for abd distension, abd xray with stool burden, increased bowel regimen.   12/12: GEORGE overnight.   12/13: GEORGE overnight.   12/14: GEORGE overnight  12/15: o/n Patient became tachycardic HR 120s and 10 minutes later O2 sat dropped as low as 89%. Patient suctioned without improvement in O2 sat and tube feeds found in suction catheter. TFs held.  STAT CXR ordered. STAT labs sent. Respiratory therapist called to bedside and patient trach connected to ventilator. After connection to ventilator and further suctioning O2 sat improved to 97% but patient HR remains 120-130s. Upgraded to NSICU for further management. Vancomycin and zosyn started. CTA  chest PE protocol and CTH ordered. Blood cultures sent.given 500cc bolus, rpt ABG sent pO2 243, CTH and CTA chest done. FS while NPO, FiO2 dec 50 pending ABG. sputum cx positive for few GPC and GNR.   12/16: GEORGE overnight, restarted amlodipine, troponin 75   12/17: GEORGE overnight, neuro stable. Attempted CPAP this morning, did not tolerate and back on full vent support. Dc'd Vanc. Tachycardia to 140s, noted extremities to be twitching along with jaw twitching. Given 2g of Keppra, total 4mg ativan and placed on EEG, full set of labs and lactate negative. Resumed trickle feeds at 20cc/hr. Given 250cc albumin for tachycardia.   12/18: GEORGE overnight. Neuro stable. CTH stable. EEG negative and dc'd.   12/19: GEORGE overnight. neuro stable. SIMV most of day, AC/VC at night.   12/20: GEORGE overnight, neuro stable. remains on VC/AC  12/21: GEORGE ovn. 1L bolus for tachy to 120s-130s.   12/22: GEORGE ovn. Tachy to 120s, given tylenol and IVF. 2mg IV ativan given for L jaw twitching. Sx resolved for 3 minutes and started again. Epilepsy contacted. Given 2mg IV ativan. Continued twitching. Increased keppra to 1500mg BID, 2mg ativan given. Propranolol started for refractory tachycardia. vEEG ordered. albumin given. POCUS performed, no b lines. Started on fosphenytoin, loaded w 20mg/kg then 100mg TID. febrile, pancx, started cefepime 2g q8, vancomycin  12/23: GEORGE ovn. +focal motor seizures on eeg. ID consulted. Vancomycin dc'ed as per ID.  12/24: GEORGE ovn, neuro stable. Baclofen 5 mg q12 started for hiccups. Na 129 from 134. Urine lytes c/w SIADH. Given 250cc 3% bolus. CT chest for infection w/u - f/u read. Repeat Na 136. UA negative  12/25: GEORGE ovn.   12/26: GEORGE ovn  12/27: GEORGE overnight.     OVERNIGHT EVENTS: Pt seen and examined in bed, unable to offer any acute complaints at this time.     Vital Signs Last 24 Hrs  T(C): 37.1 (26 Dec 2024 21:10), Max: 37.1 (26 Dec 2024 21:10)  T(F): 98.7 (26 Dec 2024 21:10), Max: 98.7 (26 Dec 2024 21:10)  HR: 74 (26 Dec 2024 22:26) (70 - 84)  BP: 122/87 (26 Dec 2024 22:26) (111/75 - 134/92)  BP(mean): 101 (26 Dec 2024 22:26) (88 - 111)  RR: 14 (26 Dec 2024 22:26) (14 - 17)  SpO2: 95% (26 Dec 2024 22:26) (95% - 99%)    Parameters below as of 26 Dec 2024 22:26  Patient On (Oxygen Delivery Method): ventilator    O2 Concentration (%): 40    I&O's Summary    25 Dec 2024 07:01  -  26 Dec 2024 07:00  --------------------------------------------------------  IN: 2800 mL / OUT: 1700 mL / NET: 1100 mL    26 Dec 2024 07:01  -  27 Dec 2024 00:06  --------------------------------------------------------  IN: 1220 mL / OUT: 1000 mL / NET: 220 mL        PHYSICAL EXAM:  Constitutional: Pt found in bed in NAD   ENT: PERRL, vertical EOMi  Respiratory: +trach, breathing non-labored, symmetrical chest wall movement  Cardiovascuar: RRR, no murmurs  Gastrointestinal: +PEG, abdomen soft, non tender  Neurological:  AAOX0. Verbal function not intact, does not FC, OE spontaneously  Motor: RUE wiggles fingers spontaneously, LUE 0/5, b/l LE withdraw from noxious   Pronator Drift: unable to assess   Wounds/Drains: N/A    TUBES/LINES:  [x] Gabriel  [] Lumbar Drain  [] Wound Drains  [] Others      DIET:  [] NPO  [] Mechanical  [x] Tube feeds via PEG     LABS:                        10.7   9.52  )-----------( 222      ( 26 Dec 2024 05:30 )             34.6     12-26    136  |  103  |  40[H]  ----------------------------<  107[H]  4.0   |  22  |  1.25    Ca    9.1      26 Dec 2024 05:30  Phos  3.4     12-26  Mg     2.1     12-26        Urinalysis Basic - ( 26 Dec 2024 05:30 )    Color: x / Appearance: x / SG: x / pH: x  Gluc: 107 mg/dL / Ketone: x  / Bili: x / Urobili: x   Blood: x / Protein: x / Nitrite: x   Leuk Esterase: x / RBC: x / WBC x   Sq Epi: x / Non Sq Epi: x / Bacteria: x          CAPILLARY BLOOD GLUCOSE          Drug Levels: [] N/A  Phenytoin Level, Serum: 12.2 ug/mL (12-24 @ 05:30)    CSF Analysis: [] N/A      Allergies    Allergy Status Unknown    Intolerances      MEDICATIONS:  Antibiotics:  cefepime   IVPB 2000 milliGRAM(s) IV Intermittent every 8 hours    Neuro:  acetaminophen   Oral Liquid .. 650 milliGRAM(s) Oral every 6 hours PRN  baclofen 5 milliGRAM(s) Oral every 12 hours  fosphenytoin IVPB 100 milliGRAM(s) PE IV Intermittent three times a day  levETIRAcetam 1500 milliGRAM(s) Oral two times a day  oxyCODONE    Solution 5 milliGRAM(s) Oral every 4 hours PRN    Anticoagulation:  heparin   Injectable 5000 Unit(s) SubCutaneous every 8 hours    OTHER:  acetylcysteine 10%  Inhalation 4 milliLiter(s) Inhalation every 6 hours  albuterol/ipratropium for Nebulization 3 milliLiter(s) Nebulizer every 6 hours  amLODIPine   Tablet 5 milliGRAM(s) Oral daily  artificial tears (preservative free) Ophthalmic Solution 1 Drop(s) Right EYE every 4 hours  chlorhexidine 0.12% Liquid 15 milliLiter(s) Oral Mucosa every 12 hours  chlorhexidine 2% Cloths 1 Application(s) Topical <User Schedule>  doxazosin 8 milliGRAM(s) Oral at bedtime  erythromycin   Ointment 1 Application(s) Right EYE at bedtime  fluticasone propionate 50 MICROgram(s)/spray Nasal Spray 1 Spray(s) Both Nostrils two times a day  ofloxacin 0.3% Solution 1 Drop(s) Right EYE four times a day  pantoprazole  Injectable 40 milliGRAM(s) IV Push daily  petrolatum Ophthalmic Ointment 1 Application(s) Both EYES two times a day  propranolol 10 milliGRAM(s) Oral every 8 hours  sodium chloride 0.65% Nasal 1 Spray(s) Both Nostrils two times a day    IVF:    CULTURES:  Culture Results:   Numerous Klebsiella aerogenes (Previously Enterobacter)  Numerous Stenotrophomonas maltophilia  Commensal iram consistent with body site (12-25 @ 20:05)  Culture Results:   No growth at 72 Hours (12-22 @ 17:45)    RADIOLOGY & ADDITIONAL TESTS:      ASSESSMENT:  43 y/o PMH ?stroke/MI present to [facility] after collapsing at work. Decorticate posturing, vomiting, intubated for airway protection. 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 Transferred to [facility] for further management. s/p trach 10/14. s/p peg 10/21. Re-upgrade to ICU 2/2 desaturation event and suctioning requirements 11/15. Re-upgrade to NSICU 12/15 2/2 desaturation and tachycardia.    Neuro:  - neuro/vitals q4h  - pain control: tylenol prn  - seizure tx: keppra 1500mg BID, fosphenytoin 100mg TID  - vEEG (10/3-4) negative, (10/17-10/19) negative, (12/17-12/18) negative, (12/22-12/25 ) +focal motor seizures not correlating w/ EEG  - epilepsy following  - CTH 9/30: enlarged pontine hemorrhage, CTH 10/3: stable, CTH 10/25: mostly resolved pontine hemorrhage, CTH 12/15: R mastoid air cell opacification; acute otitis media vs sterile effusion, CTH 12/18: stable.  - MRI brain 10/12: parenchymal hemorrhage, acute/subacute R cerebellar stroke    - stroke core measures, stroke neuro signed off    CV:  - -160  - tachycardia: propranolol 10mg q8  - HTN: amlodipine 5mg, hold lisinopril 5mg   - echo (9/30) EF 75%, repeat 12/16: 57%    Resp:  - trach to vent, AC/VC 40/400/14/6  - CTA PE protocol 12/15 negative   - Secretions: duonebs/mucomyst/chest PT q6h   - CT Chest 12/24 for infectious w/u: b/l LL atelectasis and opacities     GI:  - TF via PEG (placed 10/21 by gen surg)  - bowel regimen held for loose stool, last BM 12/26  - baclofen 5q12 for hiccups     Renal:  - IVL  - Urinary retenion: Gabriel replaced 12/15  - CKD: trend BUN/Cr  - renal US 10/1: echogenicity c/w chronic med renal dz, repeat 10/8: inc renal echogeicity, c/f medical renal disease w increased hydro     Endo:  - A1c 5.4    Heme:  - DVT ppx: SCDs, SQH 5000u q8h   - LE dopplers negative 12/16    ID:  - last pan cx 12/22  - empiric PNA: cefepime (12/22 - ), s/p vanc (12/22-12/23), s/p zosyn (12/15-12/20), s/p vanc (12/15-12/16)  - s/p Stenotrophomonas maltophilia PNA: s/p Bactrim (11/18-11/19) s/p Cefepime (11/16-11/18)  - 11/3, (+) sputum for stenotrophomas maltophlia, blood cx (+) klebsiella, cefepime 2gq12 (11/6 - 11/12)   - empiric tx: s/p zosyn (11/3-11/6) , s/p vanc  (11/3-11/5)  - S/p Ancef (10/4-10/14) for PNA, and s/p Unasyn (10/18-10/23) +actinobacter baumanii     MISC:  - ophtho consult for keratitis  - erythromycin ointment R eye q4hrs, ofloxacin ointment R eye QID, artificial tears R eye q2hrs, moisture chamber at bedtime    Dispo: SDU status, full code, pending placement     D/w Dr. D'Amico

## 2024-12-27 NOTE — PROGRESS NOTE ADULT - ASSESSMENT
44M with unclear PMH (?stroke, MI) who was found down at work, intubated for airway protection and found to have acute parenchymal hemorrhage within nabil with mass effect (+ acute/subacute right cerebellar infarct) in setting of hypertensive emergency, transferred to [facility] for further neurosurgical care. Hospital course c/b poor neurologic recovery s/p trach-PEG, AUR s/p gabriel, ANIKA on CKD c/b hyperkalemia, HAP s/p amp-sulbactam (EOT 10/23), K aerogenes bacteremia  treated with 2 weeks of Cefepime per ID , On 11/15 he became septic and was transferred to NSICU due to increased o2 requirements and needed Vent support , Trach Cultures + for Stenotrophomonas which was treated with 7 days of Bactrim per ID , has been weaned of Vent since 11/23 and is now on 10lts at 40% o2 through Trach Collar. Stepped up to the NSICU on 12/15 for hypoxia and tachycardia. PE ruled out. On abx for 5 days. Unclear etiology. Now stepped down to 8Lach.    # Pontine hemorrhage  # Encephalopathy due to Intracranial Bleed   - Acute parenchymal hemorrhage within nabil with mass effect (+ acute/subacute right cerebellar infarct) in setting of hypertensive emergency.   - MRI brain also demonstrated acute/subacute R cerebellar stroke.   - No Neurosurgical Interventions were performed   - due to IPH and cerebellar stroke patient has become Trach and Peg Dependent    # Goals of Care   - Given recurrent infections and now with Refractory Seizures , Vent Dependence palliative care was reconsulted and will re engage with family and Court appointed guardian     # Hyponatremia RESOLVED  - ? SIADH from Seizures Vs Fosphenytoin Related.    # Seizures  - f/u vEEG and epilepsy Recommendations   - on Keppra and Fosphenytoin     # Suspected Hospital Acquired Pneumonia   - CT Chest  with evidence of Bilateral lower lobe infiltrates   - Per ID continue Cefepime , Monitor blood cx and trach cultures     # Hypertension  - c/w amlodipine 5mg daily     # Acute kidney injury  # Acute Tubular Necrosis superimposed on CKD. 3  - Pt s/p US renal with chronic medical renal disease  - Elevated BUN , Not on any meds that can elevate BUN , Hgb Stable   - Cr 1.18 within pt baseline range, ctm     # Chronic respiratory failure.   -Pt s/p percutaneous trach by pulm on 10/14/24  - Currently on Vent Support , No Increase in O2 requirement   - Chest PT    # Neurogenic dysphagia.   - Pt s/p PEG with surgery 10/21/24  - TF per nutrition  - aspiration precautions and elevation of HOB.    #Acute urinary retention.   - doxazosin 8mg    # Functional quadriplegia in  setting of brainstem hemorrhage  - decub precautions  - care per nursing protocol     # DVT prop  - Heparin SQ q8     # Dispo:   Pending Prucol , Court Appointed Guardigeorge Pierson , Jason Olivier

## 2024-12-27 NOTE — PROGRESS NOTE ADULT - SUBJECTIVE AND OBJECTIVE BOX
INTERVAL HPI/OVERNIGHT EVENTS:  Tm 98.8    ROS:  Unobtainable - not interacting    ANTIBIOTICS/RELEVANT:    Cefepime 2 g IV q8h (11/6-11/12; 11/16-11/18;  12/22-present)    Amp-sulbactam 10/18-10/23  Cefazolin 10/4-10/14  Ceftriaxone 10/2-10/4  Pip-tazo 10/16-10/18, 11/3-11/6, 12/15-12/17  TMP/SX 11/18-11/19  Vancomycin 1250 mg IV q12h (10/16, 11/3-11/5, 12/22-present)      Vital Signs Last 24 Hrs  T(C): 36.6 (27 Dec 2024 14:00), Max: 37.1 (26 Dec 2024 21:10)  T(F): 97.8 (27 Dec 2024 14:00), Max: 98.8 (27 Dec 2024 05:00)  HR: 66 (27 Dec 2024 13:45) (60 - 74)  BP: 107/77 (27 Dec 2024 13:39) (107/77 - 133/95)  BP(mean): 88 (27 Dec 2024 13:39) (86 - 111)  RR: 14 (27 Dec 2024 13:39) (14 - 16)  SpO2: 100% (27 Dec 2024 13:45) (95% - 100%)    Parameters below as of 27 Dec 2024 13:39  Patient On (Oxygen Delivery Method): ventilator    O2 Concentration (%): 40    PHYSICAL EXAM:  Constitutional:  Awake, startle on L, not following commands  HEENT:  EEG leads in place, sclerae anicteric, conjunctivae clear, PERRL.  No nasal exudate or sinus tenderness;  Unable to visualize pharynx  Neck:  Supple, +trach - site clean  Respiratory:  Clear bilaterally anteriorly  Cardiovascular:  RRR, S1S2, no murmur appreciated  Gastrointestinal:  PEG site clean, normoactive BS, soft, NT, no masses, guarding or rebound.  No HSM  :  Vuong catheter   Extremities:  No edema      LABS:                        11.1   7.38  )-----------( 216      ( 27 Dec 2024 05:30 )             34.9         12-27    138  |  105  |  42[H]  ----------------------------<  102[H]  3.7   |  21[L]  |  1.23    Ca    9.4      27 Dec 2024 05:30  Phos  3.3     12-27  Mg     2.1     12-27        Urinalysis Basic - ( 27 Dec 2024 05:30 )    Color: x / Appearance: x / SG: x / pH: x  Gluc: 102 mg/dL / Ketone: x  / Bili: x / Urobili: x   Blood: x / Protein: x / Nitrite: x   Leuk Esterase: x / RBC: x / WBC x   Sq Epi: x / Non Sq Epi: x / Bacteria: x        MICROBIOLOGY:        RADIOLOGY & ADDITIONAL STUDIES:

## 2024-12-27 NOTE — PROVIDER CONTACT NOTE (CHANGE IN STATUS NOTIFICATION) - ASSESSMENT
VSS, no hypoxia in extremities or buccal membranes noted, patient resting comfortably, no tachycardia, no tachypnea

## 2024-12-27 NOTE — PROGRESS NOTE ADULT - SUBJECTIVE AND OBJECTIVE BOX
Patient is a 44y old  Male who presents with a chief complaint of brain stem bleed (25 Dec 2024 00:18)      SUBJECTIVE / OVERNIGHT EVENTS:  Afebrile overnight  Awake       MEDICATIONS  (STANDING):  acetylcysteine 10%  Inhalation 4 milliLiter(s) Inhalation every 6 hours  albuterol/ipratropium for Nebulization 3 milliLiter(s) Nebulizer every 6 hours  amLODIPine   Tablet 5 milliGRAM(s) Oral daily  artificial tears (preservative free) Ophthalmic Solution 1 Drop(s) Right EYE every 4 hours  baclofen 5 milliGRAM(s) Oral every 12 hours  cefepime   IVPB 2000 milliGRAM(s) IV Intermittent every 8 hours  chlorhexidine 0.12% Liquid 15 milliLiter(s) Oral Mucosa every 12 hours  chlorhexidine 2% Cloths 1 Application(s) Topical <User Schedule>  doxazosin 8 milliGRAM(s) Oral at bedtime  erythromycin   Ointment 1 Application(s) Right EYE at bedtime  fluticasone propionate 50 MICROgram(s)/spray Nasal Spray 1 Spray(s) Both Nostrils two times a day  fosphenytoin IVPB 100 milliGRAM(s) PE IV Intermittent three times a day  heparin   Injectable 5000 Unit(s) SubCutaneous every 8 hours  levETIRAcetam 1500 milliGRAM(s) Oral two times a day  ofloxacin 0.3% Solution 1 Drop(s) Right EYE four times a day  pantoprazole  Injectable 40 milliGRAM(s) IV Push daily  petrolatum Ophthalmic Ointment 1 Application(s) Both EYES two times a day  propranolol 10 milliGRAM(s) Oral every 8 hours  sodium chloride 0.65% Nasal 1 Spray(s) Both Nostrils two times a day    MEDICATIONS  (PRN):  acetaminophen   Oral Liquid .. 650 milliGRAM(s) Oral every 6 hours PRN Temp greater or equal to 38.5C (101.3F), Mild Pain (1 - 3)  oxyCODONE    Solution 5 milliGRAM(s) Oral every 4 hours PRN Moderate Pain (4 - 6)          CAPILLARY BLOOD GLUCOSE        PHYSICAL EXAM:    Vital Signs Last 24 Hrs  T(C): 36.4 (27 Dec 2024 08:56), Max: 37.1 (26 Dec 2024 21:10)  T(F): 97.5 (27 Dec 2024 08:56), Max: 98.8 (27 Dec 2024 05:00)  HR: 64 (27 Dec 2024 08:38) (60 - 74)  BP: 132/94 (27 Dec 2024 08:38) (114/82 - 134/92)  BP(mean): 109 (27 Dec 2024 08:38) (94 - 111)  RR: 18 (27 Dec 2024 08:38) (14 - 18)  SpO2: 100% (27 Dec 2024 08:38) (95% - 100%)    Parameters below as of 27 Dec 2024 08:38  Patient On (Oxygen Delivery Method): ventilator    O2 Concentration (%): 40        General: comfortable-appearing, no WOB on trach collar  HEENT: tracheostomy clean  Lungs: no crackles, no wheezes  Heart: regular  Abdomen: soft, no visible tenderness, + BS  Extremities: warm, no edema, not moving to command        LABS:                                     11.1   7.38  )-----------( 216      ( 27 Dec 2024 05:30 )             34.9     12-27    138  |  105  |  42[H]  ----------------------------<  102[H]  3.7   |  21[L]  |  1.23    Ca    9.4      27 Dec 2024 05:30  Phos  3.3     12-27  Mg     2.1     12-27                               Urinalysis Basic - ( 25 Dec 2024 05:30 )    Color: x / Appearance: x / SG: x / pH: x  Gluc: 113 mg/dL / Ketone: x  / Bili: x / Urobili: x   Blood: x / Protein: x / Nitrite: x   Leuk Esterase: x / RBC: x / WBC x   Sq Epi: x / Non Sq Epi: x / Bacteria: x        RADIOLOGY & ADDITIONAL TESTS:    Imaging Personally Reviewed:    Consultant(s) Notes Reviewed:      Care Discussed with Consultants/Other Providers:

## 2024-12-27 NOTE — PROGRESS NOTE ADULT - ASSESSMENT
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  CT chest on 12/24 showed extensive patchy bilateral lower lobe opacities.  Sputum culture is growing Klebsiella aerogenes and Stenotrophomonas maltophilia.  UA neg.  He was last febrile on 12/24.  Suggest:  - F/U blood cultures from 12/22  - F/U sputum culture from 12/25 for susceptibilities  - Continue cefepime 2 g IV q8h for now.    Will follow with you, team 2.

## 2024-12-28 LAB
ANION GAP SERPL CALC-SCNC: 10 MMOL/L — SIGNIFICANT CHANGE UP (ref 5–17)
ANION GAP SERPL CALC-SCNC: 8 MMOL/L — SIGNIFICANT CHANGE UP (ref 5–17)
BUN SERPL-MCNC: 45 MG/DL — HIGH (ref 7–23)
BUN SERPL-MCNC: 47 MG/DL — HIGH (ref 7–23)
CALCIUM SERPL-MCNC: 9.1 MG/DL — SIGNIFICANT CHANGE UP (ref 8.4–10.5)
CALCIUM SERPL-MCNC: 9.5 MG/DL — SIGNIFICANT CHANGE UP (ref 8.4–10.5)
CHLORIDE SERPL-SCNC: 102 MMOL/L — SIGNIFICANT CHANGE UP (ref 96–108)
CHLORIDE SERPL-SCNC: 106 MMOL/L — SIGNIFICANT CHANGE UP (ref 96–108)
CO2 SERPL-SCNC: 22 MMOL/L — SIGNIFICANT CHANGE UP (ref 22–31)
CO2 SERPL-SCNC: 23 MMOL/L — SIGNIFICANT CHANGE UP (ref 22–31)
CREAT SERPL-MCNC: 1.25 MG/DL — SIGNIFICANT CHANGE UP (ref 0.5–1.3)
CREAT SERPL-MCNC: 1.25 MG/DL — SIGNIFICANT CHANGE UP (ref 0.5–1.3)
CULTURE RESULTS: SIGNIFICANT CHANGE UP
CULTURE RESULTS: SIGNIFICANT CHANGE UP
EGFR: 72 ML/MIN/1.73M2 — SIGNIFICANT CHANGE UP
GLUCOSE SERPL-MCNC: 115 MG/DL — HIGH (ref 70–99)
GLUCOSE SERPL-MCNC: 131 MG/DL — HIGH (ref 70–99)
HCT VFR BLD CALC: 33 % — LOW (ref 39–50)
HGB BLD-MCNC: 10.7 G/DL — LOW (ref 13–17)
MAGNESIUM SERPL-MCNC: 2.2 MG/DL — SIGNIFICANT CHANGE UP (ref 1.6–2.6)
MCHC RBC-ENTMCNC: 30.9 PG — SIGNIFICANT CHANGE UP (ref 27–34)
MCHC RBC-ENTMCNC: 32.4 G/DL — SIGNIFICANT CHANGE UP (ref 32–36)
MCV RBC AUTO: 95.4 FL — SIGNIFICANT CHANGE UP (ref 80–100)
NRBC # BLD: 0 /100 WBCS — SIGNIFICANT CHANGE UP (ref 0–0)
NRBC BLD-RTO: 0 /100 WBCS — SIGNIFICANT CHANGE UP (ref 0–0)
PHOSPHATE SERPL-MCNC: 2.8 MG/DL — SIGNIFICANT CHANGE UP (ref 2.5–4.5)
PLATELET # BLD AUTO: 239 K/UL — SIGNIFICANT CHANGE UP (ref 150–400)
POTASSIUM SERPL-MCNC: 3.9 MMOL/L — SIGNIFICANT CHANGE UP (ref 3.5–5.3)
POTASSIUM SERPL-MCNC: 3.9 MMOL/L — SIGNIFICANT CHANGE UP (ref 3.5–5.3)
POTASSIUM SERPL-SCNC: 3.9 MMOL/L — SIGNIFICANT CHANGE UP (ref 3.5–5.3)
POTASSIUM SERPL-SCNC: 3.9 MMOL/L — SIGNIFICANT CHANGE UP (ref 3.5–5.3)
RBC # BLD: 3.46 M/UL — LOW (ref 4.2–5.8)
RBC # FLD: 14.6 % — HIGH (ref 10.3–14.5)
SODIUM SERPL-SCNC: 133 MMOL/L — LOW (ref 135–145)
SODIUM SERPL-SCNC: 138 MMOL/L — SIGNIFICANT CHANGE UP (ref 135–145)
SPECIMEN SOURCE: SIGNIFICANT CHANGE UP
SPECIMEN SOURCE: SIGNIFICANT CHANGE UP
WBC # BLD: 10.61 K/UL — HIGH (ref 3.8–10.5)
WBC # FLD AUTO: 10.61 K/UL — HIGH (ref 3.8–10.5)

## 2024-12-28 PROCEDURE — 99231 SBSQ HOSP IP/OBS SF/LOW 25: CPT

## 2024-12-28 PROCEDURE — 99232 SBSQ HOSP IP/OBS MODERATE 35: CPT

## 2024-12-28 PROCEDURE — 99233 SBSQ HOSP IP/OBS HIGH 50: CPT

## 2024-12-28 RX ORDER — SODIUM CHLORIDE 3 G/100ML
100 INJECTION, SOLUTION INTRAVENOUS ONCE
Refills: 0 | Status: DISCONTINUED | OUTPATIENT
Start: 2024-12-28 | End: 2024-12-28

## 2024-12-28 RX ORDER — CEFEPIME 2 G/20ML
2000 INJECTION, POWDER, FOR SOLUTION INTRAVENOUS EVERY 8 HOURS
Refills: 0 | Status: COMPLETED | OUTPATIENT
Start: 2024-12-28 | End: 2024-12-30

## 2024-12-28 RX ORDER — CEFEPIME 2 G/20ML
2000 INJECTION, POWDER, FOR SOLUTION INTRAVENOUS EVERY 8 HOURS
Refills: 0 | Status: DISCONTINUED | OUTPATIENT
Start: 2024-12-28 | End: 2024-12-28

## 2024-12-28 RX ORDER — SODIUM CHLORIDE 3 G/100ML
250 INJECTION, SOLUTION INTRAVENOUS ONCE
Refills: 0 | Status: COMPLETED | OUTPATIENT
Start: 2024-12-28 | End: 2024-12-28

## 2024-12-28 RX ADMIN — SODIUM CHLORIDE 62.5 MILLILITER(S): 3 INJECTION, SOLUTION INTRAVENOUS at 08:18

## 2024-12-28 RX ADMIN — DOXAZOSIN MESYLATE 8 MILLIGRAM(S): 8 TABLET ORAL at 21:57

## 2024-12-28 RX ADMIN — FOSPHENYTOIN SODIUM 104 MILLIGRAM(S) PE: 50 INJECTION INTRAMUSCULAR; INTRAVENOUS at 06:22

## 2024-12-28 RX ADMIN — FLUTICASONE PROPIONATE 1 SPRAY(S): 50 SPRAY, METERED NASAL at 17:29

## 2024-12-28 RX ADMIN — Medication 40 MILLIGRAM(S): at 12:25

## 2024-12-28 RX ADMIN — HEPARIN SODIUM 5000 UNIT(S): 1000 INJECTION INTRAVENOUS; SUBCUTANEOUS at 21:58

## 2024-12-28 RX ADMIN — ACETYLCYSTEINE 4 MILLILITER(S): 200 INHALANT RESPIRATORY (INHALATION) at 17:31

## 2024-12-28 RX ADMIN — LEVETIRACETAM 1500 MILLIGRAM(S): 10 INJECTION, SOLUTION INTRAVENOUS at 06:22

## 2024-12-28 RX ADMIN — CEFEPIME 100 MILLIGRAM(S): 2 INJECTION, POWDER, FOR SOLUTION INTRAVENOUS at 13:52

## 2024-12-28 RX ADMIN — Medication 1 SPRAY(S): at 05:45

## 2024-12-28 RX ADMIN — Medication 1 SPRAY(S): at 17:28

## 2024-12-28 RX ADMIN — Medication 1 DROP(S): at 13:52

## 2024-12-28 RX ADMIN — Medication 15 MILLILITER(S): at 17:29

## 2024-12-28 RX ADMIN — HEPARIN SODIUM 5000 UNIT(S): 1000 INJECTION INTRAVENOUS; SUBCUTANEOUS at 13:52

## 2024-12-28 RX ADMIN — Medication 1 DROP(S): at 21:57

## 2024-12-28 RX ADMIN — AMLODIPINE BESYLATE 5 MILLIGRAM(S): 10 TABLET ORAL at 05:38

## 2024-12-28 RX ADMIN — ERYTHROMYCIN 1 APPLICATION(S): 5 OINTMENT OPHTHALMIC at 21:57

## 2024-12-28 RX ADMIN — IPRATROPIUM BROMIDE AND ALBUTEROL SULFATE 3 MILLILITER(S): .5; 2.5 SOLUTION RESPIRATORY (INHALATION) at 12:25

## 2024-12-28 RX ADMIN — FOSPHENYTOIN SODIUM 104 MILLIGRAM(S) PE: 50 INJECTION INTRAMUSCULAR; INTRAVENOUS at 21:58

## 2024-12-28 RX ADMIN — Medication 1 APPLICATION(S): at 17:46

## 2024-12-28 RX ADMIN — FLUTICASONE PROPIONATE 1 SPRAY(S): 50 SPRAY, METERED NASAL at 05:36

## 2024-12-28 RX ADMIN — Medication 15 MILLILITER(S): at 05:34

## 2024-12-28 RX ADMIN — FOSPHENYTOIN SODIUM 104 MILLIGRAM(S) PE: 50 INJECTION INTRAMUSCULAR; INTRAVENOUS at 15:46

## 2024-12-28 RX ADMIN — BACLOFEN 5 MILLIGRAM(S): 10 INJECTION INTRATHECAL at 17:29

## 2024-12-28 RX ADMIN — Medication 1 DROP(S): at 17:30

## 2024-12-28 RX ADMIN — Medication 1 DROP(S): at 05:34

## 2024-12-28 RX ADMIN — CEFEPIME 100 MILLIGRAM(S): 2 INJECTION, POWDER, FOR SOLUTION INTRAVENOUS at 21:58

## 2024-12-28 RX ADMIN — ACETYLCYSTEINE 4 MILLILITER(S): 200 INHALANT RESPIRATORY (INHALATION) at 05:37

## 2024-12-28 RX ADMIN — OFLOXACIN 1 DROP(S): 3 SOLUTION OPHTHALMIC at 05:36

## 2024-12-28 RX ADMIN — HEPARIN SODIUM 5000 UNIT(S): 1000 INJECTION INTRAVENOUS; SUBCUTANEOUS at 05:37

## 2024-12-28 RX ADMIN — Medication 1 APPLICATION(S): at 05:39

## 2024-12-28 RX ADMIN — IPRATROPIUM BROMIDE AND ALBUTEROL SULFATE 3 MILLILITER(S): .5; 2.5 SOLUTION RESPIRATORY (INHALATION) at 17:29

## 2024-12-28 RX ADMIN — Medication 1 DROP(S): at 03:07

## 2024-12-28 RX ADMIN — Medication 1 APPLICATION(S): at 05:36

## 2024-12-28 RX ADMIN — BACLOFEN 5 MILLIGRAM(S): 10 INJECTION INTRATHECAL at 05:35

## 2024-12-28 RX ADMIN — Medication 1 DROP(S): at 09:52

## 2024-12-28 RX ADMIN — IPRATROPIUM BROMIDE AND ALBUTEROL SULFATE 3 MILLILITER(S): .5; 2.5 SOLUTION RESPIRATORY (INHALATION) at 05:34

## 2024-12-28 RX ADMIN — OFLOXACIN 1 DROP(S): 3 SOLUTION OPHTHALMIC at 12:25

## 2024-12-28 RX ADMIN — LEVETIRACETAM 1500 MILLIGRAM(S): 10 INJECTION, SOLUTION INTRAVENOUS at 17:31

## 2024-12-28 RX ADMIN — OFLOXACIN 1 DROP(S): 3 SOLUTION OPHTHALMIC at 17:30

## 2024-12-28 RX ADMIN — ACETYLCYSTEINE 4 MILLILITER(S): 200 INHALANT RESPIRATORY (INHALATION) at 12:25

## 2024-12-28 NOTE — PROGRESS NOTE ADULT - ASSESSMENT
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  CT chest on 12/24 showed extensive patchy bilateral lower lobe opacities.  Sputum culture grew Klebsiella aerogenes and Stenotrophomonas maltophilia.  UA neg.  He was last febrile on 12/24.  Klebsiella is susceptible to cefepime. Would not cover Stenotrophomonas, as he defervesced without effective therapy and this organism is frequent colonizer.  Suggest:  - Continue cefepime 2 g IV q8h to complete 7 d course (12/23- morning 12/30)  Please recall if further ID input is desired - team 2.    43 yo M with prolonged hospital stay following acute parenchymal pontine hemorrhage, R cerebellar infarct with poor neurologic recovery s/p trach/PEG s/p multiple antibiotic courses for presumed pneumonia (including Acinetobacter), Klebsiella aerogenes bacteremia (source not identified), respiratory tract colonization by Stenotrophomonas with new fever on 12/22, no leukocytosis, no L shift on 12/23 in setting of new focal seizures/status.  CT chest on 12/24 showed extensive patchy bilateral lower lobe opacities.  Sputum culture grew Klebsiella aerogenes and Stenotrophomonas maltophilia.  UA neg.  He was last febrile on 12/24.  Klebsiella is susceptible to cefepime. Would not cover Stenotrophomonas, as he defervesced without effective therapy and this organism is frequent colonizer.  Suggest:  - Continue cefepime 2 g IV q8h to complete 7 d course (12/23- morning 12/30)  Recommendations discussed with primary team. Please recall if further ID input is desired - team 2.

## 2024-12-28 NOTE — PROGRESS NOTE ADULT - SUBJECTIVE AND OBJECTIVE BOX
Patient is a 44y old  Male who presents with a chief complaint of brain stem bleed (28 Dec 2024 00:15)      SUBJECTIVE / OVERNIGHT EVENTS:  Noted to have hyponatremia to 133 this morning.  Received 3% NS bolus.   Afebrile.  Mild leukocytosis to 10.6 this morning.  Chest Xray with RLL atelectasis.    MEDICATIONS  (STANDING):  acetylcysteine 10%  Inhalation 4 milliLiter(s) Inhalation every 6 hours  albuterol/ipratropium for Nebulization 3 milliLiter(s) Nebulizer every 6 hours  amLODIPine   Tablet 5 milliGRAM(s) Oral daily  artificial tears (preservative free) Ophthalmic Solution 1 Drop(s) Right EYE every 4 hours  baclofen 5 milliGRAM(s) Oral every 12 hours  chlorhexidine 0.12% Liquid 15 milliLiter(s) Oral Mucosa every 12 hours  chlorhexidine 2% Cloths 1 Application(s) Topical <User Schedule>  doxazosin 8 milliGRAM(s) Oral at bedtime  erythromycin   Ointment 1 Application(s) Right EYE at bedtime  fluticasone propionate 50 MICROgram(s)/spray Nasal Spray 1 Spray(s) Both Nostrils two times a day  fosphenytoin IVPB 100 milliGRAM(s) PE IV Intermittent three times a day  heparin   Injectable 5000 Unit(s) SubCutaneous every 8 hours  levETIRAcetam 1500 milliGRAM(s) Oral two times a day  ofloxacin 0.3% Solution 1 Drop(s) Right EYE four times a day  pantoprazole  Injectable 40 milliGRAM(s) IV Push daily  petrolatum Ophthalmic Ointment 1 Application(s) Both EYES two times a day  propranolol 10 milliGRAM(s) Oral every 8 hours  sodium chloride 0.65% Nasal 1 Spray(s) Both Nostrils two times a day    MEDICATIONS  (PRN):  acetaminophen   Oral Liquid .. 650 milliGRAM(s) Oral every 6 hours PRN Temp greater or equal to 38.5C (101.3F), Mild Pain (1 - 3)      CAPILLARY BLOOD GLUCOSE        I&O's Summary    27 Dec 2024 07:01  -  28 Dec 2024 07:00  --------------------------------------------------------  IN: 1872 mL / OUT: 1475 mL / NET: 397 mL    28 Dec 2024 07:01  -  28 Dec 2024 10:09  --------------------------------------------------------  IN: 0 mL / OUT: 150 mL / NET: -150 mL        PHYSICAL EXAM:  Vital Signs Last 24 Hrs  T(C): 36.8 (28 Dec 2024 09:00), Max: 37 (28 Dec 2024 04:52)  T(F): 98.3 (28 Dec 2024 09:00), Max: 98.6 (28 Dec 2024 04:52)  HR: 72 (28 Dec 2024 08:26) (66 - 74)  BP: 126/92 (28 Dec 2024 08:26) (107/71 - 126/92)  BP(mean): 105 (28 Dec 2024 08:26) (85 - 105)  RR: 16 (28 Dec 2024 08:26) (14 - 18)  SpO2: 98% (28 Dec 2024 08:26) (94% - 100%)    Parameters below as of 28 Dec 2024 08:26  Patient On (Oxygen Delivery Method): ventilator    General: comfortable-appearing, no WOB on trach collar  HEENT: tracheostomy clean  Lungs: no crackles, no wheezes  Heart: regular  Abdomen: soft, no visible tenderness, + BS  Extremities: warm, no edema, not moving to command    LABS:                        10.7   10.61 )-----------( 239      ( 28 Dec 2024 05:30 )             33.0     12-28    133[L]  |  102  |  47[H]  ----------------------------<  115[H]  3.9   |  23  |  1.25    Ca    9.5      28 Dec 2024 05:30  Phos  2.8     12-28  Mg     2.2     12-28            Urinalysis Basic - ( 28 Dec 2024 05:30 )    Color: x / Appearance: x / SG: x / pH: x  Gluc: 115 mg/dL / Ketone: x  / Bili: x / Urobili: x   Blood: x / Protein: x / Nitrite: x   Leuk Esterase: x / RBC: x / WBC x   Sq Epi: x / Non Sq Epi: x / Bacteria: x        RADIOLOGY & ADDITIONAL TESTS:    Imaging Personally Reviewed:    Consultant(s) Notes Reviewed:      Care Discussed with Consultants/Other Providers:

## 2024-12-28 NOTE — PROGRESS NOTE ADULT - SUBJECTIVE AND OBJECTIVE BOX
INTERVAL HPI/OVERNIGHT EVENTS:  Afebrile    ROS:  Unobtainable - not interacting    ANTIBIOTICS/RELEVANT:    Cefepime 2 g IV q8h (11/6-11/12; 11/16-11/18;  12/22-present)    Amp-sulbactam 10/18-10/23  Cefazolin 10/4-10/14  Ceftriaxone 10/2-10/4  Pip-tazo 10/16-10/18, 11/3-11/6, 12/15-12/17  TMP/SX 11/18-11/19  Vancomycin 1250 mg IV q12h (10/16, 11/3-11/5, 12/22-present)    Vital Signs Last 24 Hrs  T(C): 37.2 (28 Dec 2024 17:42), Max: 37.2 (28 Dec 2024 17:42)  T(F): 99 (28 Dec 2024 17:42), Max: 99 (28 Dec 2024 17:42)  HR: 70 (28 Dec 2024 17:06) (70 - 76)  BP: 115/83 (28 Dec 2024 15:49) (107/74 - 126/92)  BP(mean): 95 (28 Dec 2024 15:49) (86 - 105)  RR: 16 (28 Dec 2024 17:06) (14 - 21)  SpO2: 98% (28 Dec 2024 17:06) (94% - 99%)    Parameters below as of 28 Dec 2024 17:06  Patient On (Oxygen Delivery Method): ventilator    O2 Concentration (%): 40    PHYSICAL EXAM:  Constitutional:  Awake, startle on L, not following commands  HEENT:  EEG leads in place, sclerae anicteric, conjunctivae clear, PERRL.  No nasal exudate or sinus tenderness;  Unable to visualize pharynx  Neck:  Supple, +trach - site clean  Respiratory:  Clear bilaterally anteriorly  Cardiovascular:  RRR, S1S2, no murmur appreciated  Gastrointestinal:  PEG site clean, normoactive BS, soft, NT, no masses, guarding or rebound.  No HSM  :  Vuong catheter   Extremities: no edema    LABS:                        10.7   10.61 )-----------( 239      ( 28 Dec 2024 05:30 )             33.0         12-28    138  |  106  |  45[H]  ----------------------------<  131[H]  3.9   |  22  |  1.25    Ca    9.1      28 Dec 2024 14:40  Phos  2.8     12-28  Mg     2.2     12-28        Urinalysis Basic - ( 28 Dec 2024 14:40 )    Color: x / Appearance: x / SG: x / pH: x  Gluc: 131 mg/dL / Ketone: x  / Bili: x / Urobili: x   Blood: x / Protein: x / Nitrite: x   Leuk Esterase: x / RBC: x / WBC x   Sq Epi: x / Non Sq Epi: x / Bacteria: x        MICROBIOLOGY:        RADIOLOGY & ADDITIONAL STUDIES:

## 2024-12-28 NOTE — PROGRESS NOTE ADULT - ASSESSMENT
44M with unclear PMH (?stroke, MI) who was found down at work, intubated for airway protection and found to have acute parenchymal hemorrhage within nabil with mass effect (+ acute/subacute right cerebellar infarct) in setting of hypertensive emergency, transferred to [facility] for further neurosurgical care. Hospital course c/b poor neurologic recovery s/p trach-PEG, AUR s/p gabriel, ANIKA on CKD c/b hyperkalemia, HAP s/p amp-sulbactam (EOT 10/23), K aerogenes bacteremia  treated with 2 weeks of Cefepime per ID , On 11/15 he became septic and was transferred to NSICU due to increased o2 requirements and needed Vent support , Trach Cultures + for Stenotrophomonas which was treated with 7 days of Bactrim per ID , has been weaned of Vent since 11/23 and is now on 10lts at 40% o2 through Trach Collar. Stepped up to the NSICU on 12/15 for hypoxia and tachycardia. PE ruled out. On abx for 5 days. Unclear etiology. Now stepped down to 8Lach.    # Pontine hemorrhage  # Encephalopathy due to Intracranial Bleed   - Acute parenchymal hemorrhage within nabil with mass effect (+ acute/subacute right cerebellar infarct) in setting of hypertensive emergency.   - MRI brain also demonstrated acute/subacute R cerebellar stroke.   - No Neurosurgical Interventions were performed   - due to IPH and cerebellar stroke patient has become Trach and Peg Dependent    # Goals of Care   - Given recurrent infections and now with Refractory Seizures , Vent Dependence palliative care was reconsulted and will re engage with family and Court appointed guardian Monday.    # Hyponatremia to 133 this morning.  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  - Received 3% NS bolus this morning.  - Please repeat BMP in the afternoon.     # Seizures  - f/u vEEG and epilepsy Recommendations   - on Keppra and Fosphenytoin     # Suspected Hospital Acquired Pneumonia   - CT Chest  with evidence of Bilateral lower lobe infiltrates   - Per ID continue Cefepime , Monitor blood cx and trach cultures       # Hypertension  - c/w amlodipine 5mg daily     # Acute kidney injury  # Acute Tubular Necrosis superimposed on CKD. 3  - Pt s/p US renal with chronic medical renal disease  - Elevated BUN , Not on any meds that can elevate BUN , Hgb Stable   - Cr 1.25 within pt baseline range, ctm     # Chronic respiratory failure.   -Pt s/p percutaneous trach by pulm on 10/14/24  - Currently on Vent Support , No Increase in O2 requirement   - Chest PT    # Neurogenic dysphagia.   - Pt s/p PEG with surgery 10/21/24  - TF per nutrition  - aspiration precautions and elevation of HOB.    #Acute urinary retention.   - doxazosin 8mg    # Functional quadriplegia in  setting of brainstem hemorrhage  - decub precautions  - care per nursing protocol     # DVT prop  - Heparin SQ q8     # Dispo:   Pending Prucol , Court Appointed Guardian Kenna , Jason Aguayo

## 2024-12-28 NOTE — PROGRESS NOTE ADULT - SUBJECTIVE AND OBJECTIVE BOX
HPI:  Unknown age male, unknown past medical history (reported stroke and MI by coworkers) presented to [facility] with AMS, Pt was working at [employer] when he was found down by coworkers. EMS called and pt brought to [facility] ED. Intubated, sedated, started on cardene for SBPs in 200s. CT head showed brain stem bleed. Transferred to NSICU for further management.  (30 Sep 2024 12:55)    OVERNIGHT EVENTS: Patient desaturated to 80's, pulse oximeter replaced, sating at 98%, CXR obtained, unremarkable. No other events.     Hospital Course:   9/30: transferred from [facility]. A line placed. Versed dc'd. Roomate Prasanth at bedside, states pt has family in [place], cannot confirm medications or PMH other than stroke and MI. 250cc bolus 3% given. LR switched to NS. hydralazine 25q8 started, 3% started, switched propofol to precedex   10/1: stability CTH done. Added labetalol, started TF. Palliative consulted. ethics consulted to determine surrogate. febrile 103, pan cx sent  10/2: BD 2, GEORGE overnight. TF resumed. Desatt'd to 80s, FiO2 inc. to 50. Fentanyl given, ABG, CXR ordered. Maxxed on precedex, started on propofol for DARIEN -4 - -5. Precedex dc'd. Duonebs, mucomyst, hypertonic added. 3% dc'd. Cardene dc'd. Start vanc/CTX. Increased labetalol 200q8. MRSA negative, dc'd vanc. ETT pulled back 2cm x 2, good positioning after confirmatory chest xray. Ethics attempting to establish HCP with family. Na 159, starting FW 250q6 for range 150-155.   10/3: BD3, GEORGE o/n, neuro stable. Na elevating, FW increased to 300q6. Dc'd bowel reg for diarrhea. vEEG started. SQH 5000q8 tonight.   10/4: BD 4, albumn bolus, incr. LR to 80 2/2 incr. in Cr, LR to 10 0cc/hr for uptrending Cr. Started 7% hypersal for 48hrs and SL atropine for inline/oral thick secretions. Dc'd CTX and started ancef for MSSA in the sputum. Nephrology consulted for CKD, f/u recs. SBP 170s, given hydralazine 10mg IVP.   10/5: BD5, o/n 10mg IVP hydralazine given for SBP 170s and started on hydralazine 25q8 via OGT. 10mg IV push labetalol for SBP > 160s. RT placed for diarrhea.   10/6: BD6, o/n FW increased to 350q4 per nephrology recs. IV tylenol for temp 100.6, SBp 160s presumed uncomfortable.   10/7: BD7, overnight pancultured for temp 101.8F.   10/8: BD8. GEORGE. Cr bumped. decreased LR to 75cc/hr. Adding simethicone ATC. incr hydralazine 50mgTID. Incr labetalol 300mgTID. Na 145, decreased FWF to 250q6. Start precedex. FENa consistent with intrinsic kidney injury. Pend repeat renal US. Retaining up to 1.3L, bladder scans q6, straight cath PRN  10/9: BD 9. GEORGE overnight. Neuro stable. abd xray for distention w non-specific gas pattern, OGT to LIWS for morning. duonebs/mucomyst to q8 for improving secretions. Changed tube feeds to Jevity 1.5 20cc/hr, low rate due to abdominal distention, nepro dense and more difficult to digest. Tolerating CPAP, confirmed by ABG.   10/10: BD 10. GEORGE overnight. Neuro stable. (+) gabriel for urinary retention on bladder scan. inc TF to goal rate of 40cc/hr. family leaning toward pursuing trach/PEG. 1/2 amp for FS 81.   10/11: BD 11. GEORGE overnight. Neuro stable. Trach/PEG consults placed.   10/12: BD 12. GEORGE overnight. Neuro stable. MRI brain complete.   10/13: BD 13. Increase flomax. Hold SQH after PM dose for trach tm. IVL.   10/14: BD 14. GEORGE overnight, remains on AC/VC. Gabriel placed for urinary retention. Dc'd free water.  S/p trach with pulm. NGT placed and CXR confirmed in good position.   10/15: BD 15, GEORGE ovn. resumed feeds. spiked 101, pan cx sent.   10/16: BD 16. GEORGE ovn. Lokelma 5mg for K+ 5.4. Started vanc q 24/zosyn for empiric PNA coverage, IVF to 100/hr. PEG held for fever.   10/17: BD 17,  ordered serum osm and urine osm for am. Started sinemet for neurostimulation. Increased cardura to 0.8. Started FW 100q4, dc'd IVF. MRSA negative, dc'd vanc. NGT replaced d/t coiling.   10/18: BD 18, GEORGE overnight, neuro stable. Amantadine added for neurostim. zosyn changed to unasyn for acinetobacter baumannii, failed TOV and required SC  10/19: BD 19, GEORGE ovn. cardura 2mg added for retention. labetalol decreased 200q8, hydralazine decreased 25q8. Gabriel replaced.   10/20: BD20, GEORGE overnight. NGT dislodged, replaced. PEG tomorrow w/ gen surg, FW increased to 150q4 and labetalol decreased to 100q8, lokelma given for hyperkalemia.   10/21: BD 21. POD0 PEG placement with Gen surg. decr labetolol to 50q8, incr. cardura to 0.4, started lokelma and phoslo, dc gabriel POD0 PEG placement with Gen surg.  10/22: BD 22. Plan to start TF today via PEG. dc labetalol, Following ophtho recs. Increased apnea settings - found to be in cheyne-moe respiration. CPAP 5/5.  10/23: BD 23. hydralazine d/c'd, trach collar trial today. Rectal tube placed at 6am.  10/24: BD24, o/n lokelma held due to diarrhea. Free water 100q6 resumed. dc'd tamsulosin, amantadine. Incr'd cardura to 8mg qhs. Dc'd FW. Switched jevity to nepro. gabriel placed for high urine output. Started SL atropine for oral secretions. Dc'd free water.  10/25: BD25, o/n decreased suctioning requirements to > q4hrs, GEORGE. Cr improving, cont phoslo, lokelma held at this time. Gabriel placed yest, cont. Tolerating trach collar. Given 500cc plasmalyte bolus for ANIKA. Dc'd sinemet.   10/26: BD26, o/n resumed lokelma 5mg daily and resumed 100cc free water q6hrs. Change in neuro status with new right pupillary dilation with anisocoria (right pupil 6mm fixed and left pupil 3mm briskly reactive). Given 23.4% NaCl bullet, taken for emergent CTH showing mostly resolved pontine hemorrhage, continued brainstem hypodensity likely edema d/t hemorrhage, no new hemorrhage or infarct, no herniation, mild increase in size of left lateral ventricle. Vitals remaine stable. Na goal > 140.   10/27: BD27, o/n GEORGE.Neuro stable. Pend stepdown with airway bed.   10/28: BD 28. GEORGE overnight. Neuro stable. Miralax ordered. Gabriel removed, pending TOV.  10/29: BD 29. GEORGE o/n. Given 2L NS over 8 hrs for increased BUN/Cr ratio. Gabriel placed for frequent straight cath.   10/30: BD 30.   10/31: BD 31. GEORGE overnight. Na 149, increased free water to 200q6. 1L NS for uptrending BUN.   11/1: BD 32. GEORGE overnight. Given 1L NS for dehydration. Na 146, increased FW to 250q6.   11/2: BD 33 GEORGE overnight, neuro stable, given 500cc bolus for net negative status and tachycardia   11/3: BD 34, GEORGE overnight, neuro stable. Patient remains tachycardic, EKG showing sinus tachycardia, given additional 500cc NS bolus. Febrile to 101.9F, pan cultured (without UA), CXR WNL, given tylenol.   11/4: BD 35, GEORGE overnight, neuro stable. Given 1L NS for tachycardia. sputum (+) for stenotropohomas maltophilia.   11/5: BD 36 GEORGE overnight, neuro stable. Vancomycin dc'd. Chest PT BID. ID consulted, cont zosyn.  11/6: BD 37. blood cx + klebsiella dc zosyn changed to cefepime, CTAP ordered, rpt blood cx sent.    11/7: BD 38. Pending CT A/P, given 250cc bolus and starting maintenance fluids overnight. Pending CT A/P after bolus   11/8: BD 39. CT CAP negative for infection.   11/9: BD 40. GEORGE overnight.  11/10: BD 41. GEORGE overnight. desat to 85 on trach collar, O2 inc to 10L and 100%, O2 sat inc to 95. pt tachy to 110s, euvolemic. given tylenol. ABG and CXR ordered. spiked fever, pancultured, RVP negative. AM ABG w pO2 79, rpt w pO2 79. pt appears comfortable, satting 94%.   11/11: BD 42. GEORGE overnight. pt became tachy to 130s, desat to 90 on 100% FiO2 and 10L. suctioned, (+) productive cough. temp 101.4, given 1g IV tylenol and 500cc NS bolus for euvolemia. fever and HR downtrending. LE dopplers negative for dvt  11/12: BD 43, GEORGE ovn, fever and HR downtrending, satting 97% 70% FIO2  11/13: BD 44, GEORGE ovn. started standing tylenol x24 hours for tachycardia. desat to 80s, o2 increased. CXR stable, pending CTA PE protocol.   11/14: BD 45, GEORGE overnight, neuro stable. resp therapy dec FiO2 to 70%.   11/15: BD 46, GEORGE overnight, neuro stable.  Rapid called for desaturation 30s, tachycardic 140s. Patient bagged, 100% fio2, heavily suctioned. CXR/POCUS unremarkable. ABG c/w desaturation. WBC 14.71. Afebrile. O2 improved to 90s and patient upgraded to ICU. ABG paO2 30s improved to 89 on vent. IV Tylenol x 1, sputum sent. Start protonix while o-n vent.   11/16: BD 47. POCUS showed collapsable IVCF, given 1L bolus. Vanco/Cefpime added empriic for PNA, NGT feeds restarted. MRSA swab neg, Vanc DC'd.   11/17: BD 48. GEORGE overnight. 1l bolus for tachycardia. Spiked to 101, cultured. 500cc bolus for tachycardia, tachy to 148 given 25mcg fentanyl, 250cc albumin, 1.5L bolus. 5 IV lopressor with response HR to 100s. +Stenotrophomonas on sputum cx.   11/18: BD 49. GEORGE overnight. Consulted ID, cefepime switched to bactrim x7days. Started hydrochlorothiazine 12.5mg daily.  11/19: BD 50. Tachy 120s, given tylenol and 500cc NS. Tolerating 5/5, switched to TCx. Holding phos binder. D/c Bactrim. D/c gabriel, f/u TOV. Dc'd PPI.   11/20: BD 51. GEROGE ovn. 1600 satting low 90s, mildly tachy to 110s, afebrile, RR wnl. O2 improved to mid 90s while inc O2 to 100% on TCx. CPAP 5/5 placed back on.  11/21: BD 52, GEORGE ovn, tolerating CPAP 5/5. Switch to trach collar during the day if tolerating well. HCTZ held for Cr bump, straight cath frequence increased to q4  11/22: BD 53, GEORGE ovn. Resumed phoslo. Gabriel placed. Resumed HCTZ.   11/23: BD 54. Holding tylenol in setting of possible fever, will require pan cx if febrile. Cr improved today. Cont CPAP. Bowel regimen held i/s/o diarrhea. FOBT negative.  11/24: BD 55. GEORGE overnight. Neuro stable. HCTZ dc'ed, started lisinopril 5. Lokelma dc'ed for K 3.7.   11/25: BD 56, GEORGE overnight. Neuro stable. dc'd lisinopril 5mg. Gabriel dc'd. TOV. 1545 noted to be hypotensive, MAP 50, in supine position on chair, HR 60s, afebrile, O2 96%. Given 1L cc NS bolus, placed back on bed in reverse trendelenberg, improved to map of 66. Neostick at bedside. Vitals check q1h. Dc'ed amlodipine. Failed TOV, bladder scan q6, sc prn. Added back Senna.   11/26: BD 57, GEORGE overnight. Neuro stable. Dc'd phoslo.   11/27: BD 58, GEORGE overnight. Neuro stable.    11/28: BD 59. Gabriel replaced.   11/29: GEORGE.  11/30: GEORGE, neuro stable.   12/1: BD 62, GEORGE overnight  12/2: BD 63, GEORGE overnight.; Given 1L bolus of LR for uptrending BUN/Cr.  12/3: BD 64. Reinstated eye gtt/moisture chamber given increased Rt eye injection  12/4: BD 65. GEORGE overnight. Attempted to speak with ophtho regarding eyelid weight/closure but no answer, full mailbox.   12/5: BD 66, GEORGE overnight, bowel regimen increased and had BM.   12/6: BD 67, GEORGE overnight, neuro stable.  12/7: GEORGE overnight, neuro stable. /110s, given x1 hydralazine 10 mg IVP. Restarted home amlodipine 5mg.  12/8: GEORGE. OOB to chair.     12/11: GEORGE, mucomyst added for thick secretions, simethicone for abd distension, abd xray with stool burden, increased bowel regimen.   12/12: GEORGE overnight.   12/13: GEORGE overnight.   12/14: GEORGE overnight  12/15: o/n Patient became tachycardic HR 120s and 10 minutes later O2 sat dropped as low as 89%. Patient suctioned without improvement in O2 sat and tube feeds found in suction catheter. TFs held.  STAT CXR ordered. STAT labs sent. Respiratory therapist called to bedside and patient trach connected to ventilator. After connection to ventilator and further suctioning O2 sat improved to 97% but patient HR remains 120-130s. Upgraded to NSICU for further management. Vancomycin and zosyn started. CTA  chest PE protocol and CTH ordered. Blood cultures sent.given 500cc bolus, rpt ABG sent pO2 243, CTH and CTA chest done. FS while NPO, FiO2 dec 50 pending ABG. sputum cx positive for few GPC and GNR.   12/16: GEORGE overnight, restarted amlodipine, troponin 75   12/17: GEORGE overnight, neuro stable. Attempted CPAP this morning, did not tolerate and back on full vent support. Dc'd Vanc. Tachycardia to 140s, noted extremities to be twitching along with jaw twitching. Given 2g of Keppra, total 4mg ativan and placed on EEG, full set of labs and lactate negative. Resumed trickle feeds at 20cc/hr. Given 250cc albumin for tachycardia.   12/18: GEORGE overnight. Neuro stable. CTH stable. EEG negative and dc'd.   12/19: GEORGE overnight. neuro stable. SIMV most of day, AC/VC at night.   12/20: GEORGE overnight, neuro stable. remains on VC/AC  12/21: GEORGE ovn. 1L bolus for tachy to 120s-130s.   12/22: GEORGE ovn. Tachy to 120s, given tylenol and IVF. 2mg IV ativan given for L jaw twitching. Sx resolved for 3 minutes and started again. Epilepsy contacted. Given 2mg IV ativan. Continued twitching. Increased keppra to 1500mg BID, 2mg ativan given. Propranolol started for refractory tachycardia. vEEG ordered. albumin given. POCUS performed, no b lines. Started on fosphenytoin, loaded w 20mg/kg then 100mg TID. febrile, pancx, started cefepime 2g q8, vancomycin  12/23: GEORGE ovn. +focal motor seizures on eeg. ID consulted. Vancomycin dc'ed as per ID.  12/24: GEORGE ovn, neuro stable. Baclofen 5 mg q12 started for hiccups. Na 129 from 134. Urine lytes c/w SIADH. Given 250cc 3% bolus. CT chest for infection w/u - f/u read. Repeat Na 136. UA negative  12/25: GEORGE ovn.   12/26: GEORGE ovn  12/27: GEORGE overnight. Blood cx neg @ 4 days, DC cefepime (sputum bugs colonization)    Vital Signs Last 24 Hrs  T(C): 36.4 (27 Dec 2024 22:01), Max: 37.1 (27 Dec 2024 05:00)  T(F): 97.5 (27 Dec 2024 22:01), Max: 98.8 (27 Dec 2024 05:00)  HR: 74 (27 Dec 2024 23:45) (60 - 74)  BP: 115/74 (27 Dec 2024 23:45) (107/71 - 132/94)  BP(mean): 90 (27 Dec 2024 23:45) (85 - 109)  RR: 16 (27 Dec 2024 23:45) (14 - 18)  SpO2: 96% (27 Dec 2024 23:45) (94% - 100%)  Parameters below as of 27 Dec 2024 23:45  Patient On (Oxygen Delivery Method): ventilator  O2 Concentration (%): 40    I&O's Summary    26 Dec 2024 07:01  -  27 Dec 2024 07:00  --------------------------------------------------------  IN: 1870 mL / OUT: 1950 mL / NET: -80 mL    27 Dec 2024 07:01  -  28 Dec 2024 00:16  --------------------------------------------------------  IN: 1692 mL / OUT: 1150 mL / NET: 542 mL    PHYSICAL EXAM:  Constitutional: NAD, resting comfortably in bed.   HEENT: Pupils equal, round, reactive to light  Respiratory: +Trach to vent. No respiratory distress, lungs clear to auscultation bilaterally.   Cardiovascular: RRR, S1, S2.   Gastrointestinal: Abdomen soft, non tender, nondistended.  Neurological:  AAOX0. Opens eyes to voice, tracks vertically.   Motor: RUE trace w/d. RLE w/d. LUE 0/5. LLE TF.   Sensation: unable to assess d/t mental status  Extremities: Warm, well perfused.  Wounds: none    TUBES/LINES:  [] Gabriel  [] Lumbar Drain  [] Wound Drains  [x] Others - trach, peg    DIET:  [] NPO  [] Mechanical  [x] Tube feeds    LABS:                        11.1   7.38  )-----------( 216      ( 27 Dec 2024 05:30 )             34.9     12-27    138  |  105  |  42[H]  ----------------------------<  102[H]  3.7   |  21[L]  |  1.23    Ca    9.4      27 Dec 2024 05:30  Phos  3.3     12-27  Mg     2.1     12-27    Urinalysis Basic - ( 27 Dec 2024 05:30 )  Color: x / Appearance: x / SG: x / pH: x  Gluc: 102 mg/dL / Ketone: x  / Bili: x / Urobili: x   Blood: x / Protein: x / Nitrite: x   Leuk Esterase: x / RBC: x / WBC x   Sq Epi: x / Non Sq Epi: x / Bacteria: x    CAPILLARY BLOOD GLUCOSE    Drug Levels: [] N/A  Phenytoin Level, Serum: 12.2 ug/mL (12-24 @ 05:30)  CSF Analysis: [] N/A    Allergies  Allergy Status Unknown  Intolerances    MEDICATIONS:  Antibiotics:    Neuro:  acetaminophen   Oral Liquid .. 650 milliGRAM(s) Oral every 6 hours PRN  baclofen 5 milliGRAM(s) Oral every 12 hours  fosphenytoin IVPB 100 milliGRAM(s) PE IV Intermittent three times a day  levETIRAcetam 1500 milliGRAM(s) Oral two times a day  oxyCODONE    Solution 5 milliGRAM(s) Oral every 4 hours PRN    Anticoagulation:  heparin   Injectable 5000 Unit(s) SubCutaneous every 8 hours    OTHER:  acetylcysteine 10%  Inhalation 4 milliLiter(s) Inhalation every 6 hours  albuterol/ipratropium for Nebulization 3 milliLiter(s) Nebulizer every 6 hours  amLODIPine   Tablet 5 milliGRAM(s) Oral daily  artificial tears (preservative free) Ophthalmic Solution 1 Drop(s) Right EYE every 4 hours  chlorhexidine 0.12% Liquid 15 milliLiter(s) Oral Mucosa every 12 hours  chlorhexidine 2% Cloths 1 Application(s) Topical <User Schedule>  doxazosin 8 milliGRAM(s) Oral at bedtime  erythromycin   Ointment 1 Application(s) Right EYE at bedtime  fluticasone propionate 50 MICROgram(s)/spray Nasal Spray 1 Spray(s) Both Nostrils two times a day  ofloxacin 0.3% Solution 1 Drop(s) Right EYE four times a day  pantoprazole  Injectable 40 milliGRAM(s) IV Push daily  petrolatum Ophthalmic Ointment 1 Application(s) Both EYES two times a day  propranolol 10 milliGRAM(s) Oral every 8 hours  sodium chloride 0.65% Nasal 1 Spray(s) Both Nostrils two times a day    IVF:    CULTURES:  Culture Results:   Numerous Klebsiella aerogenes (Previously Enterobacter)  Numerous Stenotrophomonas maltophilia  Commensal iram consistent with body site (12-25 @ 20:05)  Culture Results:   No growth at 4 days (12-22 @ 17:45)    RADIOLOGY & ADDITIONAL TESTS:      ASSESSMENT:    45 y/o PMH ?stroke/MI present to [facility] after collapsing at work. Decorticate posturing, vomiting, intubated for airway protection. 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 Transferred to [facility] for [facility] management. s/p trach 10/14. s/p peg 10/21. Re-upgrade to ICU 2/2 desaturation event and suctioning requirements 11/15. Re-upgrade to NSICU 12/15 2/2 desaturation and tachycardia.    Neuro:  - neuro/vitals q4h  - pain control: tylenol prn  - seizure tx: keppra 1500mg BID, fosphenytoin 100mg TID  - vEEG (10/3-4) negative, (10/17-10/19) negative, (12/17-12/18) negative, (12/22-12/25 ) +focal motor seizures not correlating w/ EEG  - epilepsy following  - CTH 9/30: enlarged pontine hemorrhage, CTH 10/3: stable, CTH 10/25: mostly resolved pontine hemorrhage, CTH 12/15: R mastoid air cell opacification; acute otitis media vs sterile effusion, CTH 12/18: stable.  - MRI brain 10/12: parenchymal hemorrhage, acute/subacute R cerebellar stroke    - stroke core measures, stroke neuro signed off    CV:  - -160  - tachycardia: propranolol 10mg q8  - HTN: amlodipine 5mg, hold lisinopril 5mg   - echo (9/30) EF 75%, repeat 12/16: 57%    Resp:  - trach to vent, AC/VC 40/400/14/6  - CTA PE protocol 12/15 negative   - Secretions: duonebs/mucomyst/chest PT q6h   - CT Chest 12/24 for infectious w/u: b/l LL atelectasis and opacities     GI:  - TF via PEG (placed 10/21 by gen surg)  - bowel regimen held for loose stool, last BM 12/26  - baclofen 5q12 for hiccups     Renal:  - IVL  - Urinary retenion: Gabriel replaced 12/15  - CKD: trend BUN/Cr  - renal US 10/1: echogenicity c/w chronic med renal dz, repeat 10/8: inc renal echogeicity, c/f medical renal disease w increased hydro     Endo:  - A1c 5.4    Heme:  - DVT ppx: SCDs, SQH 5000u q8h   - LE dopplers negative 12/16    ID:  - last pan cx 12/22  - empiric PNA: cefepime (12/22-12/27), s/p vanc (12/22-12/23), s/p zosyn (12/15-12/20), s/p vanc (12/15-12/16)  - s/p Stenotrophomonas maltophilia PNA: s/p Bactrim (11/18-11/19) s/p Cefepime (11/16-11/18)  - 11/3, (+) sputum for stenotrophomas maltophlia, blood cx (+) klebsiella, cefepime 2gq12 (11/6 - 11/12)   - empiric tx: s/p zosyn (11/3-11/6) , s/p vanc  (11/3-11/5)  - S/p Ancef (10/4-10/14) for PNA, and s/p Unasyn (10/18-10/23) +actinobacter baumanii     MISC:  - ophtho consult for keratitis  - erythromycin ointment R eye q4hrs, ofloxacin ointment R eye QID, artificial tears R eye q2hrs, moisture chamber at bedtime    Dispo: SDU status, full code, pending placement     D/w Dr. D'Amico

## 2024-12-29 LAB
ANION GAP SERPL CALC-SCNC: 13 MMOL/L — SIGNIFICANT CHANGE UP (ref 5–17)
BUN SERPL-MCNC: 47 MG/DL — HIGH (ref 7–23)
CALCIUM SERPL-MCNC: 9.6 MG/DL — SIGNIFICANT CHANGE UP (ref 8.4–10.5)
CHLORIDE SERPL-SCNC: 106 MMOL/L — SIGNIFICANT CHANGE UP (ref 96–108)
CO2 SERPL-SCNC: 20 MMOL/L — LOW (ref 22–31)
CREAT SERPL-MCNC: 1.24 MG/DL — SIGNIFICANT CHANGE UP (ref 0.5–1.3)
EGFR: 73 ML/MIN/1.73M2 — SIGNIFICANT CHANGE UP
EGFR: 73 ML/MIN/1.73M2 — SIGNIFICANT CHANGE UP
GLUCOSE SERPL-MCNC: 137 MG/DL — HIGH (ref 70–99)
HCT VFR BLD CALC: 33.8 % — LOW (ref 39–50)
HGB BLD-MCNC: 10.9 G/DL — LOW (ref 13–17)
MAGNESIUM SERPL-MCNC: 2.2 MG/DL — SIGNIFICANT CHANGE UP (ref 1.6–2.6)
MCHC RBC-ENTMCNC: 30 PG — SIGNIFICANT CHANGE UP (ref 27–34)
MCHC RBC-ENTMCNC: 32.2 G/DL — SIGNIFICANT CHANGE UP (ref 32–36)
MCV RBC AUTO: 93.1 FL — SIGNIFICANT CHANGE UP (ref 80–100)
NRBC # BLD: 0 /100 WBCS — SIGNIFICANT CHANGE UP (ref 0–0)
NRBC BLD-RTO: 0 /100 WBCS — SIGNIFICANT CHANGE UP (ref 0–0)
PHOSPHATE SERPL-MCNC: 2.3 MG/DL — LOW (ref 2.5–4.5)
PLATELET # BLD AUTO: 267 K/UL — SIGNIFICANT CHANGE UP (ref 150–400)
POTASSIUM SERPL-MCNC: 3.8 MMOL/L — SIGNIFICANT CHANGE UP (ref 3.5–5.3)
POTASSIUM SERPL-SCNC: 3.8 MMOL/L — SIGNIFICANT CHANGE UP (ref 3.5–5.3)
RBC # BLD: 3.63 M/UL — LOW (ref 4.2–5.8)
RBC # FLD: 14.6 % — HIGH (ref 10.3–14.5)
SODIUM SERPL-SCNC: 139 MMOL/L — SIGNIFICANT CHANGE UP (ref 135–145)
WBC # BLD: 9.21 K/UL — SIGNIFICANT CHANGE UP (ref 3.8–10.5)
WBC # FLD AUTO: 9.21 K/UL — SIGNIFICANT CHANGE UP (ref 3.8–10.5)

## 2024-12-29 PROCEDURE — 99233 SBSQ HOSP IP/OBS HIGH 50: CPT

## 2024-12-29 PROCEDURE — 99232 SBSQ HOSP IP/OBS MODERATE 35: CPT

## 2024-12-29 RX ORDER — POTASSIUM PHOSPHATE, MONOBASIC POTASSIUM PHOSPHATE, DIBASIC INJECTION, 236; 224 MG/ML; MG/ML
30 SOLUTION, CONCENTRATE INTRAVENOUS ONCE
Refills: 0 | Status: COMPLETED | OUTPATIENT
Start: 2024-12-29 | End: 2024-12-29

## 2024-12-29 RX ADMIN — Medication 1 DROP(S): at 09:33

## 2024-12-29 RX ADMIN — Medication 15 MILLILITER(S): at 06:18

## 2024-12-29 RX ADMIN — HEPARIN SODIUM 5000 UNIT(S): 1000 INJECTION INTRAVENOUS; SUBCUTANEOUS at 21:49

## 2024-12-29 RX ADMIN — IPRATROPIUM BROMIDE AND ALBUTEROL SULFATE 3 MILLILITER(S): .5; 2.5 SOLUTION RESPIRATORY (INHALATION) at 06:18

## 2024-12-29 RX ADMIN — Medication 15 MILLILITER(S): at 18:09

## 2024-12-29 RX ADMIN — Medication 40 MILLIGRAM(S): at 12:01

## 2024-12-29 RX ADMIN — Medication 1 DROP(S): at 06:21

## 2024-12-29 RX ADMIN — CEFEPIME 100 MILLIGRAM(S): 2 INJECTION, POWDER, FOR SOLUTION INTRAVENOUS at 21:49

## 2024-12-29 RX ADMIN — IPRATROPIUM BROMIDE AND ALBUTEROL SULFATE 3 MILLILITER(S): .5; 2.5 SOLUTION RESPIRATORY (INHALATION) at 23:08

## 2024-12-29 RX ADMIN — FLUTICASONE PROPIONATE 1 SPRAY(S): 50 SPRAY, METERED NASAL at 06:21

## 2024-12-29 RX ADMIN — ERYTHROMYCIN 1 APPLICATION(S): 5 OINTMENT OPHTHALMIC at 21:50

## 2024-12-29 RX ADMIN — POTASSIUM PHOSPHATE, MONOBASIC POTASSIUM PHOSPHATE, DIBASIC INJECTION, 83.33 MILLIMOLE(S): 236; 224 SOLUTION, CONCENTRATE INTRAVENOUS at 09:34

## 2024-12-29 RX ADMIN — AMLODIPINE BESYLATE 5 MILLIGRAM(S): 10 TABLET ORAL at 06:18

## 2024-12-29 RX ADMIN — Medication 1 DROP(S): at 18:11

## 2024-12-29 RX ADMIN — DOXAZOSIN MESYLATE 8 MILLIGRAM(S): 8 TABLET ORAL at 21:49

## 2024-12-29 RX ADMIN — HEPARIN SODIUM 5000 UNIT(S): 1000 INJECTION INTRAVENOUS; SUBCUTANEOUS at 06:18

## 2024-12-29 RX ADMIN — HEPARIN SODIUM 5000 UNIT(S): 1000 INJECTION INTRAVENOUS; SUBCUTANEOUS at 13:19

## 2024-12-29 RX ADMIN — OFLOXACIN 1 DROP(S): 3 SOLUTION OPHTHALMIC at 23:08

## 2024-12-29 RX ADMIN — ACETYLCYSTEINE 4 MILLILITER(S): 200 INHALANT RESPIRATORY (INHALATION) at 00:00

## 2024-12-29 RX ADMIN — Medication 1 SPRAY(S): at 07:41

## 2024-12-29 RX ADMIN — FOSPHENYTOIN SODIUM 104 MILLIGRAM(S) PE: 50 INJECTION INTRAMUSCULAR; INTRAVENOUS at 13:19

## 2024-12-29 RX ADMIN — Medication 1 DROP(S): at 02:00

## 2024-12-29 RX ADMIN — LEVETIRACETAM 1500 MILLIGRAM(S): 10 INJECTION, SOLUTION INTRAVENOUS at 06:18

## 2024-12-29 RX ADMIN — OFLOXACIN 1 DROP(S): 3 SOLUTION OPHTHALMIC at 18:13

## 2024-12-29 RX ADMIN — BACLOFEN 5 MILLIGRAM(S): 10 INJECTION INTRATHECAL at 18:08

## 2024-12-29 RX ADMIN — Medication 1 DROP(S): at 13:27

## 2024-12-29 RX ADMIN — ACETYLCYSTEINE 4 MILLILITER(S): 200 INHALANT RESPIRATORY (INHALATION) at 12:00

## 2024-12-29 RX ADMIN — IPRATROPIUM BROMIDE AND ALBUTEROL SULFATE 3 MILLILITER(S): .5; 2.5 SOLUTION RESPIRATORY (INHALATION) at 12:00

## 2024-12-29 RX ADMIN — BACLOFEN 5 MILLIGRAM(S): 10 INJECTION INTRATHECAL at 06:17

## 2024-12-29 RX ADMIN — CEFEPIME 100 MILLIGRAM(S): 2 INJECTION, POWDER, FOR SOLUTION INTRAVENOUS at 13:18

## 2024-12-29 RX ADMIN — Medication 1 APPLICATION(S): at 07:17

## 2024-12-29 RX ADMIN — IPRATROPIUM BROMIDE AND ALBUTEROL SULFATE 3 MILLILITER(S): .5; 2.5 SOLUTION RESPIRATORY (INHALATION) at 00:00

## 2024-12-29 RX ADMIN — Medication 1 DROP(S): at 21:50

## 2024-12-29 RX ADMIN — FLUTICASONE PROPIONATE 1 SPRAY(S): 50 SPRAY, METERED NASAL at 18:19

## 2024-12-29 RX ADMIN — ACETYLCYSTEINE 4 MILLILITER(S): 200 INHALANT RESPIRATORY (INHALATION) at 18:08

## 2024-12-29 RX ADMIN — OFLOXACIN 1 DROP(S): 3 SOLUTION OPHTHALMIC at 00:00

## 2024-12-29 RX ADMIN — FOSPHENYTOIN SODIUM 104 MILLIGRAM(S) PE: 50 INJECTION INTRAMUSCULAR; INTRAVENOUS at 06:19

## 2024-12-29 RX ADMIN — Medication 1 SPRAY(S): at 18:19

## 2024-12-29 RX ADMIN — OFLOXACIN 1 DROP(S): 3 SOLUTION OPHTHALMIC at 12:04

## 2024-12-29 RX ADMIN — LEVETIRACETAM 1500 MILLIGRAM(S): 10 INJECTION, SOLUTION INTRAVENOUS at 18:09

## 2024-12-29 RX ADMIN — Medication 1 APPLICATION(S): at 18:13

## 2024-12-29 RX ADMIN — FOSPHENYTOIN SODIUM 104 MILLIGRAM(S) PE: 50 INJECTION INTRAMUSCULAR; INTRAVENOUS at 21:50

## 2024-12-29 RX ADMIN — ACETYLCYSTEINE 4 MILLILITER(S): 200 INHALANT RESPIRATORY (INHALATION) at 23:08

## 2024-12-29 RX ADMIN — Medication 1 APPLICATION(S): at 06:20

## 2024-12-29 RX ADMIN — IPRATROPIUM BROMIDE AND ALBUTEROL SULFATE 3 MILLILITER(S): .5; 2.5 SOLUTION RESPIRATORY (INHALATION) at 18:08

## 2024-12-29 RX ADMIN — ACETYLCYSTEINE 4 MILLILITER(S): 200 INHALANT RESPIRATORY (INHALATION) at 06:19

## 2024-12-29 RX ADMIN — CEFEPIME 100 MILLIGRAM(S): 2 INJECTION, POWDER, FOR SOLUTION INTRAVENOUS at 06:20

## 2024-12-29 RX ADMIN — OFLOXACIN 1 DROP(S): 3 SOLUTION OPHTHALMIC at 06:21

## 2024-12-29 NOTE — PROVIDER CONTACT NOTE (OTHER) - ASSESSMENT
Pt is unresponsive, flexes lower extremities to pain, does not open eyes to voice or pain, pupils 2 and sluggish

## 2024-12-29 NOTE — PROGRESS NOTE ADULT - SUBJECTIVE AND OBJECTIVE BOX
HPI:  Unknown age male, unknown past medical history (reported stroke and MI by coworkers) presented to [facility] with AMS, Pt was working at [employer] when he was found down by coworkers. EMS called and pt brought to [facility] ED. Intubated, sedated, started on cardene for SBPs in 200s. CT head showed brain stem bleed. Transferred to NSICU for further management.  (30 Sep 2024 12:55)    HOSPITAL COURSE:9/30: transferred from [facility]. A line placed. Versed dc'd. Cy Rader at bedside, states pt has family in [place], cannot confirm medications or PMH other than stroke and MI. 250cc bolus 3% given. LR switched to NS. hydralazine 25q8 started, 3% started, switched propofol to precedex   10/1: stability CTH done. Added labetalol, started TF. Palliative consulted. ethics consulted to determine surrogate. febrile 103, pan cx sent  10/2: BD 2, GEORGE overnight. TF resumed. Desatt'd to 80s, FiO2 inc. to 50. Fentanyl given, ABG, CXR ordered. Maxxed on precedex, started on propofol for DARIEN -4 - -5. Precedex dc'd. Duonebs, mucomyst, hypertonic added. 3% dc'd. Cardene dc'd. Start vanc/CTX. Increased labetalol 200q8. MRSA negative, dc'd vanc. ETT pulled back 2cm x 2, good positioning after confirmatory chest xray. Ethics attempting to establish HCP with family. Na 159, starting FW 250q6 for range 150-155.   10/3: BD3, GEORGE o/n, neuro stable. Na elevating, FW increased to 300q6. Dc'd bowel reg for diarrhea. vEEG started. SQH 5000q8 tonight.   10/4: BD 4, albumn bolus, incr. LR to 80 2/2 incr. in Cr, LR to 10 0cc/hr for uptrending Cr. Started 7% hypersal for 48hrs and SL atropine for inline/oral thick secretions. Dc'd CTX and started ancef for MSSA in the sputum. Nephrology consulted for CKD, f/u recs. SBP 170s, given hydralazine 10mg IVP.   10/5: BD5, o/n 10mg IVP hydralazine given for SBP 170s and started on hydralazine 25q8 via OGT. 10mg IV push labetalol for SBP > 160s. RT placed for diarrhea.   10/6: BD6, o/n FW increased to 350q4 per nephrology recs. IV tylenol for temp 100.6, SBp 160s presumed uncomfortable.   10/7: BD7, overnight pancultured for temp 101.8F.   10/8: BD8. GEORGE. Cr bumped. decreased LR to 75cc/hr. Adding simethicone ATC. incr hydralazine 50mgTID. Incr labetalol 300mgTID. Na 145, decreased FWF to 250q6. Start precedex. FENa consistent with intrinsic kidney injury. Pend repeat renal US. Retaining up to 1.3L, bladder scans q6, straight cath PRN  10/9: BD 9. GEORGE overnight. Neuro stable. abd xray for distention w non-specific gas pattern, OGT to LIWS for morning. duonebs/mucomyst to q8 for improving secretions. Changed tube feeds to Jevity 1.5 20cc/hr, low rate due to abdominal distention, nepro dense and more difficult to digest. Tolerating CPAP, confirmed by ABG.   10/10: BD 10. GEORGE overnight. Neuro stable. (+) gabriel for urinary retention on bladder scan. inc TF to goal rate of 40cc/hr. family leaning toward pursuing trach/PEG. 1/2 amp for FS 81.   10/11: BD 11. GEORGE overnight. Neuro stable. Trach/PEG consults placed.   10/12: BD 12. GEORGE overnight. Neuro stable. MRI brain complete.   10/13: BD 13. Increase flomax. Hold SQH after PM dose for trach tm. IVL.   10/14: BD 14. GEORGE overnight, remains on AC/VC. Gabriel placed for urinary retention. Dc'd free water.  S/p trach with pulm. NGT placed and CXR confirmed in good position.   10/15: BD 15, GEORGE ovn. resumed feeds. spiked 101, pan cx sent.   10/16: BD 16. GEORGE ovn. Lokelma 5mg for K+ 5.4. Started vanc q 24/zosyn for empiric PNA coverage, IVF to 100/hr. PEG held for fever.   10/17: BD 17,  ordered serum osm and urine osm for am. Started sinemet for neurostimulation. Increased cardura to 0.8. Started FW 100q4, dc'd IVF. MRSA negative, dc'd vanc. NGT replaced d/t coiling.   10/18: BD 18, GEORGE overnight, neuro stable. Amantadine added for neurostim. zosyn changed to unasyn for acinetobacter baumannii, failed TOV and required SC  10/19: BD 19, GEORGE ovn. cardura 2mg added for retention. labetalol decreased 200q8, hydralazine decreased 25q8. Gabriel replaced.   10/20: BD20, GEORGE overnight. NGT dislodged, replaced. PEG tomorrow w/ gen surg, FW increased to 150q4 and labetalol decreased to 100q8, lokelma given for hyperkalemia.   10/21: BD 21. POD0 PEG placement with Gen surg. decr labetolol to 50q8, incr. cardura to 0.4, started lokelma and phoslo, dc gabriel POD0 PEG placement with Gen surg.  10/22: BD 22. Plan to start TF today via PEG. dc labetalol, Following ophtho recs. Increased apnea settings - found to be in cheyne-moe respiration. CPAP 5/5.  10/23: BD 23. hydralazine d/c'd, trach collar trial today. Rectal tube placed at 6am.  10/24: BD24, o/n lokelma held due to diarrhea. Free water 100q6 resumed. dc'd tamsulosin, amantadine. Incr'd cardura to 8mg qhs. Dc'd FW. Switched jevity to nepro. gabriel placed for high urine output. Started SL atropine for oral secretions. Dc'd free water.  10/25: BD25, o/n decreased suctioning requirements to > q4hrs, GEORGE. Cr improving, cont phoslo, lokelma held at this time. Gabriel placed yest, cont. Tolerating trach collar. Given 500cc plasmalyte bolus for ANIKA. Dc'd sinemet.   10/26: BD26, o/n resumed lokelma 5mg daily and resumed 100cc free water q6hrs. Change in neuro status with new right pupillary dilation with anisocoria (right pupil 6mm fixed and left pupil 3mm briskly reactive). Given 23.4% NaCl bullet, taken for emergent CTH showing mostly resolved pontine hemorrhage, continued brainstem hypodensity likely edema d/t hemorrhage, no new hemorrhage or infarct, no herniation, mild increase in size of left lateral ventricle. Vitals remaine stable. Na goal > 140.   10/27: BD27, o/n GEORGE.Neuro stable. Pend stepdown with airway bed.   10/28: BD 28. GEORGE overnight. Neuro stable. Miralax ordered. Gabriel removed, pending TOV.  10/29: BD 29. GEORGE o/n. Given 2L NS over 8 hrs for increased BUN/Cr ratio. Gabriel placed for frequent straight cath.   10/30: BD 30.   10/31: BD 31. GEORGE overnight. Na 149, increased free water to 200q6. 1L NS for uptrending BUN.   11/1: BD 32. GEORGE overnight. Given 1L NS for dehydration. Na 146, increased FW to 250q6.   11/2: BD 33 GEORGE overnight, neuro stable, given 500cc bolus for net negative status and tachycardia   11/3: BD 34, GEORGE overnight, neuro stable. Patient remains tachycardic, EKG showing sinus tachycardia, given additional 500cc NS bolus. Febrile to 101.9F, pan cultured (without UA), CXR WNL, given tylenol.   11/4: BD 35, GEORGE overnight, neuro stable. Given 1L NS for tachycardia. sputum (+) for stenotropohomas maltophilia.   11/5: BD 36 GEORGE overnight, neuro stable. Vancomycin dc'd. Chest PT BID. ID consulted, cont zosyn.  11/6: BD 37. blood cx + klebsiella dc zosyn changed to cefepime, CTAP ordered, rpt blood cx sent.    11/7: BD 38. Pending CT A/P, given 250cc bolus and starting maintenance fluids overnight. Pending CT A/P after bolus   11/8: BD 39. CT CAP negative for infection.   11/9: BD 40. GEORGE overnight.  11/10: BD 41. GEORGE overnight. desat to 85 on trach collar, O2 inc to 10L and 100%, O2 sat inc to 95. pt tachy to 110s, euvolemic. given tylenol. ABG and CXR ordered. spiked fever, pancultured, RVP negative. AM ABG w pO2 79, rpt w pO2 79. pt appears comfortable, satting 94%.   11/11: BD 42. GEORGE overnight. pt became tachy to 130s, desat to 90 on 100% FiO2 and 10L. suctioned, (+) productive cough. temp 101.4, given 1g IV tylenol and 500cc NS bolus for euvolemia. fever and HR downtrending. LE dopplers negative for dvt  11/12: BD 43, GEORGE ovn, fever and HR downtrending, satting 97% 70% FIO2  11/13: BD 44, GEORGE ovn. started standing tylenol x24 hours for tachycardia. desat to 80s, o2 increased. CXR stable, pending CTA PE protocol.   11/14: BD 45, GEORGE overnight, neuro stable. resp therapy dec FiO2 to 70%.   11/15: BD 46, GEORGE overnight, neuro stable.  Rapid called for desaturation 30s, tachycardic 140s. Patient bagged, 100% fio2, heavily suctioned. CXR/POCUS unremarkable. ABG c/w desaturation. WBC 14.71. Afebrile. O2 improved to 90s and patient upgraded to ICU. ABG paO2 30s improved to 89 on vent. IV Tylenol x 1, sputum sent. Start protonix while o-n vent.   11/16: BD 47. POCUS showed collapsable IVCF, given 1L bolus. Vanco/Cefpime added empriic for PNA, NGT feeds restarted. MRSA swab neg, Vanc DC'd.   11/17: BD 48. GEORGE overnight. 1l bolus for tachycardia. Spiked to 101, cultured. 500cc bolus for tachycardia, tachy to 148 given 25mcg fentanyl, 250cc albumin, 1.5L bolus. 5 IV lopressor with response HR to 100s. +Stenotrophomonas on sputum cx.   11/18: BD 49. GEORGE overnight. Consulted ID, cefepime switched to bactrim x7days. Started hydrochlorothiazine 12.5mg daily.  11/19: BD 50. Tachy 120s, given tylenol and 500cc NS. Tolerating 5/5, switched to TCx. Holding phos binder. D/c Bactrim. D/c gabriel, f/u TOV. Dc'd PPI.   11/20: BD 51. GEORGE ovn. 1600 satting low 90s, mildly tachy to 110s, afebrile, RR wnl. O2 improved to mid 90s while inc O2 to 100% on TCx. CPAP 5/5 placed back on.  11/21: BD 52, GEORGE ovn, tolerating CPAP 5/5. Switch to trach collar during the day if tolerating well. HCTZ held for Cr bump, straight cath frequence increased to q4  11/22: BD 53, GEORGE ovn. Resumed phoslo. Gabriel placed. Resumed HCTZ.   11/23: BD 54. Holding tylenol in setting of possible fever, will require pan cx if febrile. Cr improved today. Cont CPAP. Bowel regimen held i/s/o diarrhea. FOBT negative.  11/24: BD 55. GEORGE overnight. Neuro stable. HCTZ dc'ed, started lisinopril 5. Lokelma dc'ed for K 3.7.   11/25: BD 56, GEORGE overnight. Neuro stable. dc'd lisinopril 5mg. Gabriel dc'd. TOV. 1545 noted to be hypotensive, MAP 50, in supine position on chair, HR 60s, afebrile, O2 96%. Given 1L cc NS bolus, placed back on bed in reverse trendelenberg, improved to map of 66. Neostick at bedside. Vitals check q1h. Dc'ed amlodipine. Failed TOV, bladder scan q6, sc prn. Added back Senna.   11/26: BD 57, GEORGE overnight. Neuro stable. Dc'd phoslo.   11/27: BD 58, GEORGE overnight. Neuro stable.    11/28: BD 59. Gabriel replaced.   11/29: GEORGE.  11/30: GEORGE, neuro stable.   12/1: BD 62, GEORGE overnight  12/2: BD 63, GOERGE overnight.; Given 1L bolus of LR for uptrending BUN/Cr.  12/3: BD 64. Reinstated eye gtt/moisture chamber given increased Rt eye injection  12/4: BD 65. GEORGE overnight. Attempted to speak with ophtho regarding eyelid weight/closure but no answer, full mailbox.   12/5: BD 66, GEORGE overnight, bowel regimen increased and had BM.   12/6: BD 67, GEORGE overnight, neuro stable.  12/7: GEORGE overnight, neuro stable. /110s, given x1 hydralazine 10 mg IVP. Restarted home amlodipine 5mg.  12/8: GEORGE. OOB to chair.     12/11: GEORGE, mucomyst added for thick secretions, simethicone for abd distension, abd xray with stool burden, increased bowel regimen.   12/12: GEORGE overnight.   12/13: GEORGE overnight.   12/14: GEORGE overnight  12/15: o/n Patient became tachycardic HR 120s and 10 minutes later O2 sat dropped as low as 89%. Patient suctioned without improvement in O2 sat and tube feeds found in suction catheter. TFs held.  STAT CXR ordered. STAT labs sent. Respiratory therapist called to bedside and patient trach connected to ventilator. After connection to ventilator and further suctioning O2 sat improved to 97% but patient HR remains 120-130s. Upgraded to NSICU for further management. Vancomycin and zosyn started. CTA  chest PE protocol and CTH ordered. Blood cultures sent.given 500cc bolus, rpt ABG sent pO2 243, CTH and CTA chest done. FS while NPO, FiO2 dec 50 pending ABG. sputum cx positive for few GPC and GNR.   12/16: GEORGE overnight, restarted amlodipine, troponin 75   12/17: GEORGE overnight, neuro stable. Attempted CPAP this morning, did not tolerate and back on full vent support. Dc'd Vanc. Tachycardia to 140s, noted extremities to be twitching along with jaw twitching. Given 2g of Keppra, total 4mg ativan and placed on EEG, full set of labs and lactate negative. Resumed trickle feeds at 20cc/hr. Given 250cc albumin for tachycardia.   12/18: GEORGE overnight. Neuro stable. CTH stable. EEG negative and dc'd.   12/19: GEORGE overnight. neuro stable. SIMV most of day, AC/VC at night.   12/20: GEORGE overnight, neuro stable. remains on VC/AC  12/21: GEORGE ovn. 1L bolus for tachy to 120s-130s.   12/22: GEORGE ovn. Tachy to 120s, given tylenol and IVF. 2mg IV ativan given for L jaw twitching. Sx resolved for 3 minutes and started again. Epilepsy contacted. Given 2mg IV ativan. Continued twitching. Increased keppra to 1500mg BID, 2mg ativan given. Propranolol started for refractory tachycardia. vEEG ordered. albumin given. POCUS performed, no b lines. Started on fosphenytoin, loaded w 20mg/kg then 100mg TID. febrile, pancx, started cefepime 2g q8, vancomycin  12/23: GEORGE ovn. +focal motor seizures on eeg. ID consulted. Vancomycin dc'ed as per ID.  12/24: GEORGE ovn, neuro stable. Baclofen 5 mg q12 started for hiccups. Na 129 from 134. Urine lytes c/w SIADH. Given 250cc 3% bolus. CT chest for infection w/u - f/u read. Repeat Na 136. UA negative  12/25: GEORGE ovn.   12/26: GEORGE ovn  12/27: GEORGE overnight. Blood cx neg @ 4 days, DC cefepime per medicine (sputum colonized).   12/28: Desaturation o/n to 80's, CXR obtained, pulse ox changed and sats resolved. Na 133 from 138, 250cc 3% bolus given. Cefepime resumed until 12/30 per ID. Rpt Na 138.    OVERNIGHT EVENTS: GEORGE overnight     Vital Signs Last 24 Hrs  T(C): 36.6 (28 Dec 2024 23:52), Max: 37.2 (28 Dec 2024 17:42)  T(F): 97.9 (28 Dec 2024 23:52), Max: 99 (28 Dec 2024 17:42)  HR: 76 (29 Dec 2024 00:51) (70 - 76)  BP: 117/75 (28 Dec 2024 23:52) (107/74 - 126/92)  BP(mean): 92 (28 Dec 2024 23:52) (86 - 105)  RR: 15 (29 Dec 2024 00:51) (14 - 21)  SpO2: 98% (29 Dec 2024 00:51) (97% - 100%)    Parameters below as of 29 Dec 2024 00:51  Patient On (Oxygen Delivery Method): ventilator    O2 Concentration (%): 40    I&O's Summary    27 Dec 2024 07:01  -  28 Dec 2024 07:00  --------------------------------------------------------  IN: 1872 mL / OUT: 1475 mL / NET: 397 mL    28 Dec 2024 07:01  -  29 Dec 2024 03:45  --------------------------------------------------------  IN: 730 mL / OUT: 575 mL / NET: 155 mL    PHYSICAL EXAM:  Constitutional: NAD, resting comfortably in bed.   HEENT: Pupils equal, round, reactive to light  Respiratory: +Trach to vent. No respiratory distress, symmetric chest rise   Cardiovascular: RRR, S1, S2.   Gastrointestinal: Abdomen soft, non tender, nondistended.  Neurological:  AAOX0. Opens eyes to voice, tracks vertically.   Motor: RUE trace w/d. RLE w/d. LUE 0/5. LLE TF.   Sensation: unable to assess d/t mental status  Extremities: Warm, well perfused.  Wounds: none    TUBES/LINES:  [X] Gabriel  [] Lumbar Drain  [] Wound Drains  [] Others    DIET:  [] NPO  [] Mechanical  [X] Tube feeds    LABS:                        10.7   10.61 )-----------( 239      ( 28 Dec 2024 05:30 )             33.0     12-28    138  |  106  |  45[H]  ----------------------------<  131[H]  3.9   |  22  |  1.25    Ca    9.1      28 Dec 2024 14:40  Phos  2.8     12-28  Mg     2.2     12-28      Urinalysis Basic - ( 28 Dec 2024 14:40 )    Color: x / Appearance: x / SG: x / pH: x  Gluc: 131 mg/dL / Ketone: x  / Bili: x / Urobili: x   Blood: x / Protein: x / Nitrite: x   Leuk Esterase: x / RBC: x / WBC x   Sq Epi: x / Non Sq Epi: x / Bacteria: x    Drug Levels: [] N/A  Phenytoin Level, Serum: 12.2 ug/mL (12-24 @ 05:30)      Allergies    Allergy Status Unknown    Intolerances      MEDICATIONS:  Antibiotics:  cefepime   IVPB 2000 milliGRAM(s) IV Intermittent every 8 hours    Neuro:  acetaminophen   Oral Liquid .. 650 milliGRAM(s) Oral every 6 hours PRN  baclofen 5 milliGRAM(s) Oral every 12 hours  fosphenytoin IVPB 100 milliGRAM(s) PE IV Intermittent three times a day  levETIRAcetam 1500 milliGRAM(s) Oral two times a day    Anticoagulation:  heparin   Injectable 5000 Unit(s) SubCutaneous every 8 hours    OTHER:  acetylcysteine 10%  Inhalation 4 milliLiter(s) Inhalation every 6 hours  albuterol/ipratropium for Nebulization 3 milliLiter(s) Nebulizer every 6 hours  amLODIPine   Tablet 5 milliGRAM(s) Oral daily  artificial tears (preservative free) Ophthalmic Solution 1 Drop(s) Right EYE every 4 hours  chlorhexidine 0.12% Liquid 15 milliLiter(s) Oral Mucosa every 12 hours  chlorhexidine 2% Cloths 1 Application(s) Topical <User Schedule>  doxazosin 8 milliGRAM(s) Oral at bedtime  erythromycin   Ointment 1 Application(s) Right EYE at bedtime  fluticasone propionate 50 MICROgram(s)/spray Nasal Spray 1 Spray(s) Both Nostrils two times a day  ofloxacin 0.3% Solution 1 Drop(s) Right EYE four times a day  pantoprazole  Injectable 40 milliGRAM(s) IV Push daily  petrolatum Ophthalmic Ointment 1 Application(s) Both EYES two times a day  propranolol 10 milliGRAM(s) Oral every 8 hours  sodium chloride 0.65% Nasal 1 Spray(s) Both Nostrils two times a day    CULTURES:  Culture Results:   Numerous Klebsiella aerogenes (Previously Enterobacter)  Numerous Stenotrophomonas maltophilia  Commensal iram consistent with body site (12-25 @ 20:05)  Culture Results:   No growth at 5 days (12-22 @ 17:45)      ASSESSMENT:  45 y/o PMH ?stroke/MI present to [facility] after collapsing at work. Decorticate posturing, vomiting, intubated for airway protection. 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 Transferred to [facility] for further management. s/p trach 10/14. s/p peg 10/21. Re-upgrade to ICU 2/2 desaturation event and suctioning requirements 11/15. Re-upgrade to NSICU 12/15 2/2 desaturation and tachycardia.    AMS    Handoff    MEWS Score    Acute myocardial infarction    CVA (cerebral vascular accident)    Intracerebral hemorrhage of brain stem    Brainstem stroke    Brain stem stroke syndrome    Brain stem hemorrhage    Brain stem stroke syndrome    Hemorrhagic stroke    Brainstem stroke    Encephalopathy acute    Functional quadriplegia    Advanced care planning/counseling discussion    Encounter for palliative care    Pontine hemorrhage    Neurogenic dysphagia    Chronic respiratory failure    Acute kidney injury superimposed on CKD    Acute urinary retention    Hypertensive emergency    Sepsis, unspecified organism    Sepsis    Gram-negative bacteremia    Dyspnea    Percutaneous tracheal puncture    Altered mental status examination    EGD, with PEG    AMS    Room Service Assist    [email or web address]      PLAN:  Neuro:  - neuro/vitals q4h  - pain control: tylenol prn  - seizure tx: keppra 1500mg BID, fosphenytoin 100mg TID  - vEEG (10/3-4) negative, (10/17-10/19) negative, (12/17-12/18) negative, (12/22-12/25 ) +focal motor seizures not correlating w/ EEG  - epilepsy following  - CTH 9/30: enlarged pontine hemorrhage, CTH 10/3: stable, CTH 10/25: mostly resolved pontine hemorrhage, CTH 12/15: R mastoid air cell opacification; acute otitis media vs sterile effusion, CTH 12/18: stable.  - MRI brain 10/12: parenchymal hemorrhage, acute/subacute R cerebellar stroke    - stroke neuro signed off    CV:  - -160  - tachycardia: propranolol 10mg q8  - HTN: amlodipine 5mg, hold lisinopril 5mg   - echo (9/30) EF 75%, repeat 12/16: 57%    Resp:  - trach to vent, AC/VC 40/400/14/6  - CTA PE protocol 12/15 negative   - Secretions: duonebs/mucomyst/chest PT q6h   - CT Chest 12/24 for infectious w/u: b/l LL atelectasis and opacities     GI:  - TF via PEG (placed 10/21 by gen surg)  - bowel regimen held for loose stool, last BM 12/26  - baclofen 5q12 for hiccups     Renal:  - IVL  - Urinary retenion: Gabriel replaced 12/15  - CKD: trend BUN/Cr  - renal US 10/1: echogenicity c/w chronic med renal dz, repeat 10/8: inc renal echogeicity, c/f medical renal disease w increased hydro     Endo:  - A1c 5.4    Heme:  - DVT ppx: SCDs, SQH 5000u q8h   - LE dopplers negative 12/16    ID:  - last pan cx 12/22  - empiric PNA: cefepime (12/22-12/30), s/p vanc (12/22-12/23), s/p zosyn (12/15-12/20), s/p vanc (12/15-12/16)  - s/p Stenotrophomonas maltophilia PNA: s/p Bactrim (11/18-11/19) s/p Cefepime (11/16-11/18)  - 11/3, (+) sputum for stenotrophomas maltophlia, blood cx (+) klebsiella, cefepime 2gq12 (11/6 - 11/12)   - empiric tx: s/p zosyn (11/3-11/6) , s/p vanc  (11/3-11/5)  - S/p Ancef (10/4-10/14) for PNA, and s/p Unasyn (10/18-10/23) +actinobacter baumanii     MISC:  - ophtho consult for keratitis  - erythromycin ointment R eye q4hrs, ofloxacin ointment R eye QID, artificial tears R eye q2hrs, moisture chamber at bedtime    Dispo: SDU status, full code, pending placement     D/w Dr. D'Amico

## 2024-12-29 NOTE — PROGRESS NOTE ADULT - ASSESSMENT
44M with unclear PMH (?stroke, MI) who was found down at work, intubated for airway protection and found to have acute parenchymal hemorrhage within nabil with mass effect (+ acute/subacute right cerebellar infarct) in setting of hypertensive emergency, transferred to [facility] for further neurosurgical care. Hospital course c/b poor neurologic recovery s/p trach-PEG, AUR s/p gabriel, ANIKA on CKD c/b hyperkalemia, HAP s/p amp-sulbactam (EOT 10/23), K aerogenes bacteremia  treated with 2 weeks of Cefepime per ID , On 11/15 he became septic and was transferred to NSICU due to increased o2 requirements and needed Vent support , Trach Cultures + for Stenotrophomonas which was treated with 7 days of Bactrim per ID , has been weaned of Vent since 11/23 and is now on 10lts at 40% o2 through Trach Collar. Stepped up to the NSICU on 12/15 for hypoxia and tachycardia. PE ruled out. On abx for 5 days. Unclear etiology. Now stepped down to 8Lach.    # Pontine hemorrhage  # Encephalopathy due to Intracranial Bleed   - Acute parenchymal hemorrhage within nabil with mass effect (+ acute/subacute right cerebellar infarct) in setting of hypertensive emergency.   - MRI brain also demonstrated acute/subacute R cerebellar stroke.   - No Neurosurgical Interventions were performed   - due to IPH and cerebellar stroke patient has become Trach and Peg Dependent    # Goals of Care   - Given recurrent infections and now with Refractory Seizures , Vent Dependence palliative care was reconsulted and will re engage with family and Court appointed guardian Monday.    # Hyponatremia RESOLVED - 139 this morning.   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    # Seizures  - f/u vEEG and epilepsy Recommendations   - on Keppra and Fosphenytoin     # Suspected Hospital Acquired Pneumonia   - CT Chest  with evidence of Bilateral lower lobe infiltrates   - Per ID continue Cefepime until 12/30 , Monitor blood cx and trach cultures       # Hypertension  - c/w amlodipine 5mg daily     # Acute kidney injury  # Acute Tubular Necrosis superimposed on CKD. 3  - Pt s/p US renal with chronic medical renal disease  - Elevated BUN , Not on any meds that can elevate BUN , Hgb Stable   - Cr 1.24 within pt baseline range, ctm     # Chronic respiratory failure.   -Pt s/p percutaneous trach by pulm on 10/14/24  - Currently on Vent Support , No Increase in O2 requirement   - Chest PT    # Neurogenic dysphagia.   - Pt s/p PEG with surgery 10/21/24  - TF per nutrition  - aspiration precautions and elevation of HOB.    #Acute urinary retention.   - doxazosin 8mg    # Functional quadriplegia in  setting of brainstem hemorrhage  - decub precautions  - care per nursing protocol     # DVT prop  - Heparin SQ q8     # Dispo:   Pending Prucol , Court Appointed Guardian Kenna , Jason Aguayo

## 2024-12-29 NOTE — PROGRESS NOTE ADULT - SUBJECTIVE AND OBJECTIVE BOX
Patient is a 44y old  Male who presents with a chief complaint of brain stem bleed (29 Dec 2024 03:45)      SUBJECTIVE / OVERNIGHT EVENTS:  No acute events overnight.  Cefepime resumed yesterday to complete on 12/30.  Family meeting tomorrow.     MEDICATIONS  (STANDING):  acetylcysteine 10%  Inhalation 4 milliLiter(s) Inhalation every 6 hours  albuterol/ipratropium for Nebulization 3 milliLiter(s) Nebulizer every 6 hours  amLODIPine   Tablet 5 milliGRAM(s) Oral daily  artificial tears (preservative free) Ophthalmic Solution 1 Drop(s) Right EYE every 4 hours  baclofen 5 milliGRAM(s) Oral every 12 hours  cefepime   IVPB 2000 milliGRAM(s) IV Intermittent every 8 hours  chlorhexidine 0.12% Liquid 15 milliLiter(s) Oral Mucosa every 12 hours  chlorhexidine 2% Cloths 1 Application(s) Topical <User Schedule>  doxazosin 8 milliGRAM(s) Oral at bedtime  erythromycin   Ointment 1 Application(s) Right EYE at bedtime  fluticasone propionate 50 MICROgram(s)/spray Nasal Spray 1 Spray(s) Both Nostrils two times a day  fosphenytoin IVPB 100 milliGRAM(s) PE IV Intermittent three times a day  heparin   Injectable 5000 Unit(s) SubCutaneous every 8 hours  levETIRAcetam 1500 milliGRAM(s) Oral two times a day  ofloxacin 0.3% Solution 1 Drop(s) Right EYE four times a day  pantoprazole  Injectable 40 milliGRAM(s) IV Push daily  petrolatum Ophthalmic Ointment 1 Application(s) Both EYES two times a day  propranolol 10 milliGRAM(s) Oral every 8 hours  sodium chloride 0.65% Nasal 1 Spray(s) Both Nostrils two times a day    MEDICATIONS  (PRN):  acetaminophen   Oral Liquid .. 650 milliGRAM(s) Oral every 6 hours PRN Temp greater or equal to 38.5C (101.3F), Mild Pain (1 - 3)      CAPILLARY BLOOD GLUCOSE        I&O's Summary    28 Dec 2024 07:01  -  29 Dec 2024 07:00  --------------------------------------------------------  IN: 2010 mL / OUT: 1875 mL / NET: 135 mL    29 Dec 2024 07:01  -  29 Dec 2024 10:14  --------------------------------------------------------  IN: 0 mL / OUT: 100 mL / NET: -100 mL        PHYSICAL EXAM:  Vital Signs Last 24 Hrs  T(C): 37.1 (29 Dec 2024 08:58), Max: 37.2 (28 Dec 2024 17:42)  T(F): 98.7 (29 Dec 2024 08:58), Max: 99 (28 Dec 2024 17:42)  HR: 78 (29 Dec 2024 09:05) (70 - 103)  BP: 143/97 (29 Dec 2024 08:00) (107/74 - 152/98)  BP(mean): 115 (29 Dec 2024 08:00) (86 - 119)  RR: 18 (29 Dec 2024 09:05) (15 - 21)  SpO2: 100% (29 Dec 2024 09:05) (93% - 100%)    Parameters below as of 29 Dec 2024 09:05  Patient On (Oxygen Delivery Method): ventilator    General: comfortable-appearing, no WOB on trach collar  HEENT: tracheostomy clean  Lungs: no crackles, no wheezes  Heart: regular  Abdomen: soft, no visible tenderness, + BS, +PEG  Extremities: warm, no edema, not moving to command      LABS:                        10.9   9.21  )-----------( 267      ( 29 Dec 2024 05:30 )             33.8     12-29    139  |  106  |  47[H]  ----------------------------<  137[H]  3.8   |  20[L]  |  1.24    Ca    9.6      29 Dec 2024 05:30  Phos  2.3     12-29  Mg     2.2     12-29            Urinalysis Basic - ( 29 Dec 2024 05:30 )    Color: x / Appearance: x / SG: x / pH: x  Gluc: 137 mg/dL / Ketone: x  / Bili: x / Urobili: x   Blood: x / Protein: x / Nitrite: x   Leuk Esterase: x / RBC: x / WBC x   Sq Epi: x / Non Sq Epi: x / Bacteria: x        RADIOLOGY & ADDITIONAL TESTS:    Imaging Personally Reviewed:    Consultant(s) Notes Reviewed:      Care Discussed with Consultants/Other Providers:

## 2024-12-30 LAB
ANION GAP SERPL CALC-SCNC: 10 MMOL/L — SIGNIFICANT CHANGE UP (ref 5–17)
BUN SERPL-MCNC: 49 MG/DL — HIGH (ref 7–23)
CALCIUM SERPL-MCNC: 9.1 MG/DL — SIGNIFICANT CHANGE UP (ref 8.4–10.5)
CHLORIDE SERPL-SCNC: 105 MMOL/L — SIGNIFICANT CHANGE UP (ref 96–108)
CO2 SERPL-SCNC: 21 MMOL/L — LOW (ref 22–31)
CREAT SERPL-MCNC: 1.29 MG/DL — SIGNIFICANT CHANGE UP (ref 0.5–1.3)
EGFR: 69 ML/MIN/1.73M2 — SIGNIFICANT CHANGE UP
EGFR: 69 ML/MIN/1.73M2 — SIGNIFICANT CHANGE UP
GLUCOSE SERPL-MCNC: 117 MG/DL — HIGH (ref 70–99)
HCT VFR BLD CALC: 30.9 % — LOW (ref 39–50)
HGB BLD-MCNC: 10 G/DL — LOW (ref 13–17)
MAGNESIUM SERPL-MCNC: 2.1 MG/DL — SIGNIFICANT CHANGE UP (ref 1.6–2.6)
MCHC RBC-ENTMCNC: 31.2 PG — SIGNIFICANT CHANGE UP (ref 27–34)
MCHC RBC-ENTMCNC: 32.4 G/DL — SIGNIFICANT CHANGE UP (ref 32–36)
MCV RBC AUTO: 96.3 FL — SIGNIFICANT CHANGE UP (ref 80–100)
NRBC # BLD: 0 /100 WBCS — SIGNIFICANT CHANGE UP (ref 0–0)
NRBC BLD-RTO: 0 /100 WBCS — SIGNIFICANT CHANGE UP (ref 0–0)
PHOSPHATE SERPL-MCNC: 4.2 MG/DL — SIGNIFICANT CHANGE UP (ref 2.5–4.5)
PLATELET # BLD AUTO: 243 K/UL — SIGNIFICANT CHANGE UP (ref 150–400)
POTASSIUM SERPL-MCNC: 4.1 MMOL/L — SIGNIFICANT CHANGE UP (ref 3.5–5.3)
POTASSIUM SERPL-SCNC: 4.1 MMOL/L — SIGNIFICANT CHANGE UP (ref 3.5–5.3)
RBC # BLD: 3.21 M/UL — LOW (ref 4.2–5.8)
RBC # FLD: 14.6 % — HIGH (ref 10.3–14.5)
SODIUM SERPL-SCNC: 136 MMOL/L — SIGNIFICANT CHANGE UP (ref 135–145)
WBC # BLD: 9.78 K/UL — SIGNIFICANT CHANGE UP (ref 3.8–10.5)
WBC # FLD AUTO: 9.78 K/UL — SIGNIFICANT CHANGE UP (ref 3.8–10.5)

## 2024-12-30 PROCEDURE — 99233 SBSQ HOSP IP/OBS HIGH 50: CPT

## 2024-12-30 PROCEDURE — 99497 ADVNCD CARE PLAN 30 MIN: CPT | Mod: 25

## 2024-12-30 PROCEDURE — 99233 SBSQ HOSP IP/OBS HIGH 50: CPT | Mod: GC

## 2024-12-30 PROCEDURE — 99498 ADVNCD CARE PLAN ADDL 30 MIN: CPT | Mod: 25

## 2024-12-30 PROCEDURE — 99232 SBSQ HOSP IP/OBS MODERATE 35: CPT

## 2024-12-30 PROCEDURE — 99232 SBSQ HOSP IP/OBS MODERATE 35: CPT | Mod: GC

## 2024-12-30 RX ORDER — SODIUM PHOSPHATE,DIBASIC DIHYD
30 POWDER (GRAM) MISCELLANEOUS ONCE
Refills: 0 | Status: DISCONTINUED | OUTPATIENT
Start: 2024-12-30 | End: 2024-12-30

## 2024-12-30 RX ADMIN — AMLODIPINE BESYLATE 5 MILLIGRAM(S): 10 TABLET ORAL at 05:28

## 2024-12-30 RX ADMIN — Medication 40 MILLIGRAM(S): at 12:07

## 2024-12-30 RX ADMIN — FLUTICASONE PROPIONATE 1 SPRAY(S): 50 SPRAY, METERED NASAL at 05:31

## 2024-12-30 RX ADMIN — LEVETIRACETAM 1500 MILLIGRAM(S): 10 INJECTION, SOLUTION INTRAVENOUS at 17:37

## 2024-12-30 RX ADMIN — Medication 15 MILLILITER(S): at 05:28

## 2024-12-30 RX ADMIN — FLUTICASONE PROPIONATE 1 SPRAY(S): 50 SPRAY, METERED NASAL at 17:39

## 2024-12-30 RX ADMIN — Medication 1 DROP(S): at 14:50

## 2024-12-30 RX ADMIN — Medication 1 APPLICATION(S): at 17:43

## 2024-12-30 RX ADMIN — FOSPHENYTOIN SODIUM 104 MILLIGRAM(S) PE: 50 INJECTION INTRAMUSCULAR; INTRAVENOUS at 05:29

## 2024-12-30 RX ADMIN — FOSPHENYTOIN SODIUM 104 MILLIGRAM(S) PE: 50 INJECTION INTRAMUSCULAR; INTRAVENOUS at 22:09

## 2024-12-30 RX ADMIN — IPRATROPIUM BROMIDE AND ALBUTEROL SULFATE 3 MILLILITER(S): .5; 2.5 SOLUTION RESPIRATORY (INHALATION) at 23:03

## 2024-12-30 RX ADMIN — ACETYLCYSTEINE 4 MILLILITER(S): 200 INHALANT RESPIRATORY (INHALATION) at 23:03

## 2024-12-30 RX ADMIN — OFLOXACIN 1 DROP(S): 3 SOLUTION OPHTHALMIC at 05:30

## 2024-12-30 RX ADMIN — Medication 1 APPLICATION(S): at 05:30

## 2024-12-30 RX ADMIN — ACETYLCYSTEINE 4 MILLILITER(S): 200 INHALANT RESPIRATORY (INHALATION) at 17:36

## 2024-12-30 RX ADMIN — OFLOXACIN 1 DROP(S): 3 SOLUTION OPHTHALMIC at 17:37

## 2024-12-30 RX ADMIN — HEPARIN SODIUM 5000 UNIT(S): 1000 INJECTION INTRAVENOUS; SUBCUTANEOUS at 05:28

## 2024-12-30 RX ADMIN — Medication 1 SPRAY(S): at 05:30

## 2024-12-30 RX ADMIN — Medication 1 DROP(S): at 10:19

## 2024-12-30 RX ADMIN — FOSPHENYTOIN SODIUM 104 MILLIGRAM(S) PE: 50 INJECTION INTRAMUSCULAR; INTRAVENOUS at 14:52

## 2024-12-30 RX ADMIN — CEFEPIME 100 MILLIGRAM(S): 2 INJECTION, POWDER, FOR SOLUTION INTRAVENOUS at 05:28

## 2024-12-30 RX ADMIN — OFLOXACIN 1 DROP(S): 3 SOLUTION OPHTHALMIC at 23:03

## 2024-12-30 RX ADMIN — IPRATROPIUM BROMIDE AND ALBUTEROL SULFATE 3 MILLILITER(S): .5; 2.5 SOLUTION RESPIRATORY (INHALATION) at 17:36

## 2024-12-30 RX ADMIN — IPRATROPIUM BROMIDE AND ALBUTEROL SULFATE 3 MILLILITER(S): .5; 2.5 SOLUTION RESPIRATORY (INHALATION) at 05:27

## 2024-12-30 RX ADMIN — Medication 1 SPRAY(S): at 17:41

## 2024-12-30 RX ADMIN — Medication 1 DROP(S): at 17:38

## 2024-12-30 RX ADMIN — DOXAZOSIN MESYLATE 8 MILLIGRAM(S): 8 TABLET ORAL at 22:08

## 2024-12-30 RX ADMIN — BACLOFEN 5 MILLIGRAM(S): 10 INJECTION INTRATHECAL at 05:28

## 2024-12-30 RX ADMIN — Medication 1 DROP(S): at 22:15

## 2024-12-30 RX ADMIN — ERYTHROMYCIN 1 APPLICATION(S): 5 OINTMENT OPHTHALMIC at 22:15

## 2024-12-30 RX ADMIN — Medication 1 APPLICATION(S): at 05:31

## 2024-12-30 RX ADMIN — OFLOXACIN 1 DROP(S): 3 SOLUTION OPHTHALMIC at 12:08

## 2024-12-30 RX ADMIN — ACETYLCYSTEINE 4 MILLILITER(S): 200 INHALANT RESPIRATORY (INHALATION) at 12:06

## 2024-12-30 RX ADMIN — ACETYLCYSTEINE 4 MILLILITER(S): 200 INHALANT RESPIRATORY (INHALATION) at 05:28

## 2024-12-30 RX ADMIN — IPRATROPIUM BROMIDE AND ALBUTEROL SULFATE 3 MILLILITER(S): .5; 2.5 SOLUTION RESPIRATORY (INHALATION) at 12:06

## 2024-12-30 RX ADMIN — HEPARIN SODIUM 5000 UNIT(S): 1000 INJECTION INTRAVENOUS; SUBCUTANEOUS at 14:50

## 2024-12-30 RX ADMIN — LEVETIRACETAM 1500 MILLIGRAM(S): 10 INJECTION, SOLUTION INTRAVENOUS at 05:29

## 2024-12-30 RX ADMIN — Medication 15 MILLILITER(S): at 17:36

## 2024-12-30 RX ADMIN — Medication 1 DROP(S): at 05:29

## 2024-12-30 RX ADMIN — BACLOFEN 5 MILLIGRAM(S): 10 INJECTION INTRATHECAL at 17:36

## 2024-12-30 RX ADMIN — HEPARIN SODIUM 5000 UNIT(S): 1000 INJECTION INTRAVENOUS; SUBCUTANEOUS at 22:08

## 2024-12-30 RX ADMIN — Medication 1 DROP(S): at 01:03

## 2024-12-30 NOTE — PROGRESS NOTE ADULT - SUBJECTIVE AND OBJECTIVE BOX
[facility] and Palliative [facility]  Silvia Goldsmith, Geriatrics & Palliative Care NP  Contact Info: Call [phone] (HEAL Line) or message on Microsoft Teams    SUBJECTIVE/OBJECTIVE:  Interval events reviewed. Pt seen and examined at bedside. Pt appears comfortable. Opens eyes to voice. Does not track.    ALLERGIES: Allergy Status Unknown      MEDICATIONS: REVIEWED  MEDICATIONS  (STANDING):  acetylcysteine 10%  Inhalation 4 milliLiter(s) Inhalation every 6 hours  albuterol/ipratropium for Nebulization 3 milliLiter(s) Nebulizer every 6 hours  amLODIPine   Tablet 5 milliGRAM(s) Oral daily  artificial tears (preservative free) Ophthalmic Solution 1 Drop(s) Right EYE every 4 hours  baclofen 5 milliGRAM(s) Oral every 12 hours  chlorhexidine 0.12% Liquid 15 milliLiter(s) Oral Mucosa every 12 hours  chlorhexidine 2% Cloths 1 Application(s) Topical <User Schedule>  doxazosin 8 milliGRAM(s) Oral at bedtime  erythromycin   Ointment 1 Application(s) Right EYE at bedtime  fluticasone propionate 50 MICROgram(s)/spray Nasal Spray 1 Spray(s) Both Nostrils two times a day  fosphenytoin IVPB 100 milliGRAM(s) PE IV Intermittent three times a day  heparin   Injectable 5000 Unit(s) SubCutaneous every 8 hours  levETIRAcetam 1500 milliGRAM(s) Oral two times a day  ofloxacin 0.3% Solution 1 Drop(s) Right EYE four times a day  pantoprazole  Injectable 40 milliGRAM(s) IV Push daily  petrolatum Ophthalmic Ointment 1 Application(s) Both EYES two times a day  propranolol 10 milliGRAM(s) Oral every 8 hours  sodium chloride 0.65% Nasal 1 Spray(s) Both Nostrils two times a day    MEDICATIONS  (PRN):  acetaminophen   Oral Liquid .. 650 milliGRAM(s) Oral every 6 hours PRN Temp greater or equal to 38.5C (101.3F), Mild Pain (1 - 3)      Analgesic Use (Scheduled and PRNs) for past 24 hours (6a-6a):      baclofen   5 milliGRAM(s) Oral (12-30-24 @ 17:36)   5 milliGRAM(s) Oral (12-30-24 @ 05:28)    fosphenytoin IVPB   104 mL/Hr IV Intermittent (12-30-24 @ 14:52)   104 mL/Hr IV Intermittent (12-30-24 @ 05:29)   104 mL/Hr IV Intermittent (12-29-24 @ 21:50)    levETIRAcetam   1500 milliGRAM(s) Oral (12-30-24 @ 17:37)   1500 milliGRAM(s) Oral (12-30-24 @ 05:29)      T(C): 36.8 (12-30-24 @ 17:30), Max: 37.1 (12-29-24 @ 22:11)  HR: 68 (12-30-24 @ 16:34) (68 - 82)  BP: 119/89 (12-30-24 @ 16:34) (102/66 - 119/89)  RR: 16 (12-30-24 @ 16:34) (15 - 20)  SpO2: 100% (12-30-24 @ 16:34) (99% - 100%)  Wt(kg): --      LABS: REVIEWED  CBC:                        10.0   9.78  )-----------( 243      ( 30 Dec 2024 05:30 )             30.9     CMP:    12-30    136  |  105  |  49[H]  ----------------------------<  117[H]  4.1   |  21[L]  |  1.29    Ca    9.1      30 Dec 2024 05:30  Phos  4.2     12-30  Mg     2.1     12-30        RADIOLOGY & ADDITIONAL STUDIES:     DISCUSSION OF CASE:    CARE COORDINATION:

## 2024-12-30 NOTE — PROGRESS NOTE ADULT - ASSESSMENT
43yo M w/ reported PMHx of MI and previous strokes, who was admitted to [facility] on 09/30 for AMS, found to have acute large parenchymal hemorrhage within nabil with mass effect (+ acute/subacute right cerebellar infarct) in setting of hypertensive emergency, and R cerebellar stroke , c/b poor neurologic recovery, now trach/vent dependent/PEG, with recurrent fever/infections and ICU readmissions a/w focal status epilepticus. 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    PPS 10%. Prognosis is poor.  Patient is at high risk of death and complications.  Clinically worsening despite aggressive medical care.  FULL CODE.    Recommend family meeting with neurosurgery team and patient's son, Olivier, for information sharing and updating on worsening of mental status.  Would also discuss with ID if medical indication to continue IV antibiotics if infection reoccurs after the current Abx course.

## 2024-12-30 NOTE — PROGRESS NOTE ADULT - CONVERSATION DETAILS
Language Line  #[id number]  Discussed patient's prognosis with son. He indicated understanding about patient's irreversible brain damage and that he will not wake up from this. We then discussed the options for continuing care being continue with life support/ventilation and do a tracheostomy or extubate patient and keep him comfortable for the time he has left. We also explained that if the patient undergoes a tracheostomy, he will need to live in a long term care facility and would not be able to return home. Olivier appeared to understand but said "I don't know to in this case and I want to talk to my family about it." We agreed to give him time to discuss with other members of his family and then reconvene to continue GOC discussion.
Family meeting with neurosurgery team and patient's son, Olivier, for information sharing and updating on worsening mental status. Reviewed the patient's clinical course to date. Olivier verbalized understanding of the severity of his father's stroke, poor neurological recovery, risk for complications secondary to debility and overall poor prognosis. Discussed the risks/benefits/potential poor outcomes of LST including CPR in setting of chronic illness and overall medical frailty. Expressed concern that patient is unlikely to return to functional and cognitive baselines if he were to undergo heroic measures. Explained alternative to allow natural death and emphasized that DNR does not correspond to comfort measures only. Olivier feels that the patient in his own way interacts with him in a meaningful way and remains hopeful for any degree of recovery. Olivier wishes to continue with medical support (ie mechanical ventilatory support, enteral feedings) to extend his father's life with a DNR in place. In the event of cardiac arrest, Olivier would want to allow for a natural death. Emotional support provided. All questions answered.
Language Line  #[id number]    Spoke with patient's son Olivier about his wishes regarding a tracheotomy. I expressed that my prognosis regarding recovery was very limited and that I did not think he was likely to ever wake up let alone be able to interact with those around him. I also explained that other providers were more hopeful but that either way, there is a very high chance, at least, that he will not be able to interact with those around him in the future. Given this likelihood, I asked Olivier if he felt that a trach and PEG was what he wanted for his dad. His response was that if this was a decision between two options: 1) trach and peg and allow him to live longer or 2) extubate and allow a natural death, he would pick trach to allow him to live longer. We discussed that his quality of life would be very different and that he would not be able to go back to [place] to be with his extended family, and that he would need to live in a nursing facility that could take care of him while on a vent. Olivier expressed understanding and never wavered in his decision.

## 2024-12-30 NOTE — PROGRESS NOTE ADULT - SUBJECTIVE AND OBJECTIVE BOX
O/N Events: GEORGE  Subjective/ROS: Denies HA, CP, SOB, n/v, changes in bowel/urinary habits.  12pt ROS otherwise negative.    VITALS  Vital Signs Last 24 Hrs  T(C): 36.7 (30 Dec 2024 12:58), Max: 37.1 (29 Dec 2024 22:11)  T(F): 98 (30 Dec 2024 12:58), Max: 98.8 (29 Dec 2024 22:11)  HR: 68 (30 Dec 2024 12:17) (68 - 82)  BP: 110/65 (30 Dec 2024 12:17) (102/66 - 111/75)  BP(mean): 82 (30 Dec 2024 12:17) (78 - 89)  RR: 16 (30 Dec 2024 12:17) (14 - 20)  SpO2: 99% (30 Dec 2024 12:17) (98% - 100%)    Parameters below as of 30 Dec 2024 12:17  Patient On (Oxygen Delivery Method): ventilator    O2 Concentration (%): 40    I&O's Summary    29 Dec 2024 07:01  -  30 Dec 2024 07:00  --------------------------------------------------------  IN: 2809.8 mL / OUT: 1375 mL / NET: 1434.8 mL    30 Dec 2024 07:01  -  30 Dec 2024 13:52  --------------------------------------------------------  IN: 50 mL / OUT: 600 mL / NET: -550 mL        CAPILLARY BLOOD GLUCOSE    PHYSICAL EXAM  General: comfortable-appearing, no WOB on trach collar  HEENT: tracheostomy clean  Lungs: no crackles, no wheezes  Heart: regular  Abdomen: soft, no visible tenderness, + BS, +PEG  Extremities: warm, no edema, not moving to command    MEDICATIONS  (STANDING):  acetylcysteine 10%  Inhalation 4 milliLiter(s) Inhalation every 6 hours  albuterol/ipratropium for Nebulization 3 milliLiter(s) Nebulizer every 6 hours  amLODIPine   Tablet 5 milliGRAM(s) Oral daily  artificial tears (preservative free) Ophthalmic Solution 1 Drop(s) Right EYE every 4 hours  baclofen 5 milliGRAM(s) Oral every 12 hours  chlorhexidine 0.12% Liquid 15 milliLiter(s) Oral Mucosa every 12 hours  chlorhexidine 2% Cloths 1 Application(s) Topical <User Schedule>  doxazosin 8 milliGRAM(s) Oral at bedtime  erythromycin   Ointment 1 Application(s) Right EYE at bedtime  fluticasone propionate 50 MICROgram(s)/spray Nasal Spray 1 Spray(s) Both Nostrils two times a day  fosphenytoin IVPB 100 milliGRAM(s) PE IV Intermittent three times a day  heparin   Injectable 5000 Unit(s) SubCutaneous every 8 hours  levETIRAcetam 1500 milliGRAM(s) Oral two times a day  ofloxacin 0.3% Solution 1 Drop(s) Right EYE four times a day  pantoprazole  Injectable 40 milliGRAM(s) IV Push daily  petrolatum Ophthalmic Ointment 1 Application(s) Both EYES two times a day  propranolol 10 milliGRAM(s) Oral every 8 hours  sodium chloride 0.65% Nasal 1 Spray(s) Both Nostrils two times a day    MEDICATIONS  (PRN):  acetaminophen   Oral Liquid .. 650 milliGRAM(s) Oral every 6 hours PRN Temp greater or equal to 38.5C (101.3F), Mild Pain (1 - 3)      Allergy Status Unknown      LABS                        10.0   9.78  )-----------( 243      ( 30 Dec 2024 05:30 )             30.9     12-30    136  |  105  |  49[H]  ----------------------------<  117[H]  4.1   |  21[L]  |  1.29    Ca    9.1      30 Dec 2024 05:30  Phos  4.2     12-30  Mg     2.1     12-30        Urinalysis Basic - ( 30 Dec 2024 05:30 )    Color: x / Appearance: x / SG: x / pH: x  Gluc: 117 mg/dL / Ketone: x  / Bili: x / Urobili: x   Blood: x / Protein: x / Nitrite: x   Leuk Esterase: x / RBC: x / WBC x   Sq Epi: x / Non Sq Epi: x / Bacteria: x            IMAGING/EKG/ETC: reviewed

## 2024-12-30 NOTE — PROGRESS NOTE ADULT - ASSESSMENT
44 year old male with functional, ADL, cognitive, and speech impairments in the setting of brainstem hemorrhage, s/p trach and PEG.     Functional quadriplegia in the setting of Brainstem hemorrhage   Respiratory dysfunction s/p Trach - on vent  Spasticity - left finger flexors  Dysphagia s/p PEG  HTN - amlodipine, HCTZ  Urinary retention - +gabriel    PLAN / RECOMMENDATIONS:     # Rehab / Mobilization:   - Initial therapy assessment: [X] PT  [X] OT   - Reconsult PT/OT when appropriate - improved alertness and command following  - nursing to reposition q2 turning to prevent skin breakdown given limited mobility   - Keep extremities elevated on pillows to assist with edema and positioning.   - Ensure proper positioning of neck to midline to avoid torticollis   - Therapy precautions: falls, orthostasis, hypoxia    # Bracing/Splinting:   - Z-Flow multi-podus boots for positioning/contracture prevention  - Continue Left resting hand splint 8am-8pm     # Pain Management:   - Avoid/ monitor use sedating medications that may interfere with cognitive recovery   - Current pain regimen reviewed    # Speech/ Swallow:   - Dysphagia screen [X]  - SLP consult for swallow function evaluation and treatment, evaluate for PMV - ok for PMV during therapy sessions  - Diet:  NPO, continuous feeds through PEG    # GI/ Bowel:  - Continue to monitor bowel patterns.   - Bowel Regimen: miralax    # / Bladder: + gabriel  - Continue to monitor bladder patterns, avoid constipation.       # DVT Prophylaxis:   - SCDs, chemoprophylaxis - heparin    # Disposition optimization:   - Disposition recommendation - TBD  - Discharge barriers: Pending meeting with Son 12/30, [facility], clinical course, and functional progress

## 2024-12-30 NOTE — PROGRESS NOTE ADULT - TIME BILLING
Emotional Support/Supportive Care and Clarification of Potential Disease Trajectory related to  Assessment of Symptom [place] and Palliative regimen  Education and Monitoring of Opiates including titration and management of high risk medications  Discharge Facilitation with ongoing coordination  Exploration of GOC/Advanced Directives including HCP designation, code status, and hospice eligibility    Time exclusive of ACP discussion  Time inclusive of chart review, medication ordering, discussion with primary team, clinical documentation, and communication with family/caregiver

## 2024-12-30 NOTE — PROGRESS NOTE ADULT - ASSESSMENT
44M with unclear PMH (?stroke, MI) who was found down at work, intubated for airway protection and found to have acute parenchymal hemorrhage within nabil with mass effect (+ acute/subacute right cerebellar infarct) in setting of hypertensive emergency, transferred to [facility] for further neurosurgical care. Hospital course c/b poor neurologic recovery s/p trach-PEG, AUR s/p gabriel, ANIKA on CKD c/b hyperkalemia, HAP s/p amp-sulbactam (EOT 10/23), K aerogenes bacteremia  treated with 2 weeks of Cefepime per ID , On 11/15 he became septic and was transferred to NSICU due to increased o2 requirements and needed Vent support , Trach Cultures + for Stenotrophomonas which was treated with 7 days of Bactrim per ID , has been weaned of Vent since 11/23 and is now on 10lts at 40% o2 through Trach Collar. Stepped up to the NSICU on 12/15 for hypoxia and tachycardia. PE ruled out. On abx for 5 days. Unclear etiology. Now stepped down to 8Lach.    # Pontine hemorrhage  # Encephalopathy due to Intracranial Bleed   - Acute parenchymal hemorrhage within nabil with mass effect (+ acute/subacute right cerebellar infarct) in setting of hypertensive emergency.   - MRI brain also demonstrated acute/subacute R cerebellar stroke.   - No Neurosurgical Interventions were performed   - due to IPH and cerebellar stroke patient has become Trach and Peg Dependent    # Goals of Care   - Given recurrent infections and now with Refractory Seizures , Vent Dependence palliative care was reconsulted  - Patient is currently DNR but will proceed without de-escalation at this time    # Hyponatremia RESOLVED - 139 this morning.   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    # Seizures  - f/u vEEG and epilepsy Recommendations   - on Keppra and Fosphenytoin     # Suspected Hospital Acquired Pneumonia   - CT Chest  with evidence of Bilateral lower lobe infiltrates   - Per ID continue Cefepime until 12/30 , Monitor blood cx and trach cultures       # Hypertension  - c/w amlodipine 5mg daily     # Acute kidney injury  # Acute Tubular Necrosis superimposed on CKD. 3  - Pt s/p US renal with chronic medical renal disease  - Elevated BUN , Not on any meds that can elevate BUN , Hgb Stable   - Cr 1.24 within pt baseline range, ctm     # Chronic respiratory failure.   -Pt s/p percutaneous trach by pulm on 10/14/24  - Currently on Vent Support , No Increase in O2 requirement   - Chest PT    # Neurogenic dysphagia.   - Pt s/p PEG with surgery 10/21/24  - TF per nutrition  - aspiration precautions and elevation of HOB.    #Acute urinary retention.   - doxazosin 8mg    # Functional quadriplegia in  setting of brainstem hemorrhage  - decub precautions  - care per nursing protocol     # DVT prop  - Heparin SQ q8     # Dispo:   Pending [place] , Court Appointed Guardian Jason Pierson

## 2024-12-30 NOTE — PROGRESS NOTE ADULT - NSPROGADDITIONALINFOA_GEN_ALL_CORE
40 minutes spent on total encounter. The necessity of the time spent during the encounter on this date of service was due to:     Obtaining separately reported history including review of hospital course, relevant imaging, therapy notes, and consultant notes  Performing medically appropriate examination  Counseling and educating patient/ family/caregivers  Care coordination  Communication with primary team  Documenting clinical information
35 minutes spent on total encounter. The necessity of the time spent during the encounter on this date of service was due to:     Obtaining separately reported history including review of hospital course, relevant imaging, therapy notes, and consultant notes  Performing medically appropriate examination  Counseling and educating patient/ family/caregivers  Care coordination  Communication with primary team  Documenting clinical information
40 minutes spent on total encounter. The necessity of the time spent during the encounter on this date of service was due to:     Obtaining separately reported history including review of hospital course, relevant imaging, therapy notes, and consultant notes  Performing medically appropriate examination  Counseling and educating patient/ family/caregivers  Care coordination  Communication with primary team  Documenting clinical information
55 minutes spent on total encounter. The necessity of the time spent during the encounter on this date of service was due to:     Obtaining separately reported history including review of hospital course, relevant imaging, therapy notes, and consultant notes  Performing medically appropriate examination  Counseling and educating patient/ family/caregivers  Care coordination  Communication with primary team  Documenting clinical information
40 minutes spent on total encounter. The necessity of the time spent during the encounter on this date of service was due to:     Obtaining separately reported history including review of hospital course, relevant imaging, therapy notes, and consultant notes  Performing medically appropriate examination  Counseling and educating patient/ family/caregivers  Care coordination  Communication with primary team  Documenting clinical information
I have personally seen and examined the patient, and reviewed the above clinical note and all of its components and:  [ ] agree with the above assessment and plan without modifications.  [x] agree with the above with modifications made to the assessment and/or plan of care.  [ ] disagree with the above with significant modifications as listed below:
-    Dispo: TBD - pending PRUCOL application, tranfser to [facility]    Recommendations and plan discussed with primary team - all questions answered.
-    Dispo: TBD - pending PRUCOL application    Recommendations and plan discussed with primary team - all questions answered.

## 2024-12-30 NOTE — PROGRESS NOTE ADULT - SUBJECTIVE AND OBJECTIVE BOX
INTERVAL COURSE / SUBJECTIVE:  Seen at bedside this AM. Since last in ICU patient now with poor arousal and minimal command following.   Treated for pneumonia and focal seizures.   Discharged from PT/OT program until appropriate.   -----------------------------------------------------------------------  REVIEW OF SYSTEMS:  Unable to reliably assess  Bladder - +gabriel  -----------------------------------------------------------------------  FUNCTIONAL PROGRESS:    No PT/OT updates    -----------------------------------------------------------------------  VITALS  T(C): 36.1 (12-30-24 @ 08:54), Max: 37.1 (12-29-24 @ 22:11)  HR: 68 (12-30-24 @ 08:17) (68 - 82)  BP: 109/73 (12-30-24 @ 08:17) (102/66 - 111/75)  RR: 18 (12-30-24 @ 08:17) (14 - 20)  SpO2: 100% (12-30-24 @ 08:17) (98% - 100%)  Wt(kg): --    PHYSICAL EXAM:    General - NAD, resting comfortably.   Heart- slightly tachycardic  Lungs- +trach to vent. No distress  Abd- no visible abdominal distension   Ext- No calf pain, extremities well perfused  Neuro- somnolent, opens eyes spontaneously. Withdraws to pain. No voluntary movement of extremities at this time.   -----------------------------------------------------------------------  RECENT LABS  CBC Full  -  ( 30 Dec 2024 05:30 )  WBC Count : 9.78 K/uL  RBC Count : 3.21 M/uL  Hemoglobin : 10.0 g/dL  Hematocrit : 30.9 %  Platelet Count - Automated : 243 K/uL  Mean Cell Volume : 96.3 fl  Mean Cell Hemoglobin : 31.2 pg  Mean Cell Hemoglobin Concentration : 32.4 g/dL  Auto Neutrophil # : x  Auto Lymphocyte # : x  Auto Monocyte # : x  Auto Eosinophil # : x  Auto Basophil # : x  Auto Neutrophil % : x  Auto Lymphocyte % : x  Auto Monocyte % : x  Auto Eosinophil % : x  Auto Basophil % : x    12-30    136  |  105  |  49[H]  ----------------------------<  117[H]  4.1   |  21[L]  |  1.29    Ca    9.1      30 Dec 2024 05:30  Phos  4.2     12-30  Mg     2.1     12-30      Urinalysis Basic - ( 30 Dec 2024 05:30 )    Color: x / Appearance: x / SG: x / pH: x  Gluc: 117 mg/dL / Ketone: x  / Bili: x / Urobili: x   Blood: x / Protein: x / Nitrite: x   Leuk Esterase: x / RBC: x / WBC x   Sq Epi: x / Non Sq Epi: x / Bacteria: x        -----------------------------------------------------------------------  IMAGING    < from: CT Chest No Cont (12.24.24 @ 14:11) >  IMPRESSION:  Partial atelectasis both lower lobes, decreased on the left compared to   the prior study. Extensive patchy bilateral lower lobe opacities.    < end of copied text >    -----------------------------------------------------------------------  MEDICATIONS   acetaminophen   Oral Liquid .. 650 milliGRAM(s) every 6 hours PRN  acetylcysteine 10%  Inhalation 4 milliLiter(s) every 6 hours  albuterol/ipratropium for Nebulization 3 milliLiter(s) every 6 hours  amLODIPine   Tablet 5 milliGRAM(s) daily  artificial tears (preservative free) Ophthalmic Solution 1 Drop(s) every 4 hours  baclofen 5 milliGRAM(s) every 12 hours  chlorhexidine 0.12% Liquid 15 milliLiter(s) every 12 hours  chlorhexidine 2% Cloths 1 Application(s) <User Schedule>  doxazosin 8 milliGRAM(s) at bedtime  erythromycin   Ointment 1 Application(s) at bedtime  fluticasone propionate 50 MICROgram(s)/spray Nasal Spray 1 Spray(s) two times a day  fosphenytoin IVPB 100 milliGRAM(s) PE three times a day  heparin   Injectable 5000 Unit(s) every 8 hours  levETIRAcetam 1500 milliGRAM(s) two times a day  ofloxacin 0.3% Solution 1 Drop(s) four times a day  pantoprazole  Injectable 40 milliGRAM(s) daily  petrolatum Ophthalmic Ointment 1 Application(s) two times a day  propranolol 10 milliGRAM(s) every 8 hours  sodium chloride 0.65% Nasal 1 Spray(s) two times a day

## 2024-12-30 NOTE — PROGRESS NOTE ADULT - PROBLEM SELECTOR PLAN 1
-PPS 10%  -frequent repositioning and skin care  -patient at risk for decubitus ulcers  -supportive care

## 2024-12-30 NOTE — PROGRESS NOTE ADULT - PROBLEM SELECTOR PLAN 3
-management as per primary team   -case discussed w/ neurosurgery PA on 12/24; no plan for further intervention at the moment  -hospice eligible if within [facility]

## 2024-12-30 NOTE — PROGRESS NOTE ADULT - SUBJECTIVE AND OBJECTIVE BOX
HPI:  Unknown age male, unknown past medical history (reported stroke and MI by coworkers) presented to [facility] with AMS, Pt was working at [employer] when he was found down by coworkers. EMS called and pt brought to [facility] ED. Intubated, sedated, started on cardene for SBPs in 200s. CT head showed brain stem bleed. Transferred to NSICU for further management.  (30 Sep 2024 12:55)    HOSPITAL COURSE:  9/30: transferred from [facility]. A line placed. Versed dc'd. Cy Rader at bedside, states pt has family in [place], cannot confirm medications or PMH other than stroke and MI. 250cc bolus 3% given. LR switched to NS. hydralazine 25q8 started, 3% started, switched propofol to precedex   10/1: stability CTH done. Added labetalol, started TF. Palliative consulted. ethics consulted to determine surrogate. febrile 103, pan cx sent  10/2: BD 2, GEORGE overnight. TF resumed. Desatt'd to 80s, FiO2 inc. to 50. Fentanyl given, ABG, CXR ordered. Maxxed on precedex, started on propofol for DARIEN -4 - -5. Precedex dc'd. Duonebs, mucomyst, hypertonic added. 3% dc'd. Cardene dc'd. Start vanc/CTX. Increased labetalol 200q8. MRSA negative, dc'd vanc. ETT pulled back 2cm x 2, good positioning after confirmatory chest xray. Ethics attempting to establish HCP with family. Na 159, starting FW 250q6 for range 150-155.   10/3: BD3, GEORGE o/n, neuro stable. Na elevating, FW increased to 300q6. Dc'd bowel reg for diarrhea. vEEG started. SQH 5000q8 tonight.   10/4: BD 4, albumn bolus, incr. LR to 80 2/2 incr. in Cr, LR to 10 0cc/hr for uptrending Cr. Started 7% hypersal for 48hrs and SL atropine for inline/oral thick secretions. Dc'd CTX and started ancef for MSSA in the sputum. Nephrology consulted for CKD, f/u recs. SBP 170s, given hydralazine 10mg IVP.   10/5: BD5, o/n 10mg IVP hydralazine given for SBP 170s and started on hydralazine 25q8 via OGT. 10mg IV push labetalol for SBP > 160s. RT placed for diarrhea.   10/6: BD6, o/n FW increased to 350q4 per nephrology recs. IV tylenol for temp 100.6, SBp 160s presumed uncomfortable.   10/7: BD7, overnight pancultured for temp 101.8F.   10/8: BD8. GEORGE. Cr bumped. decreased LR to 75cc/hr. Adding simethicone ATC. incr hydralazine 50mgTID. Incr labetalol 300mgTID. Na 145, decreased FWF to 250q6. Start precedex. FENa consistent with intrinsic kidney injury. Pend repeat renal US. Retaining up to 1.3L, bladder scans q6, straight cath PRN  10/9: BD 9. GEORGE overnight. Neuro stable. abd xray for distention w non-specific gas pattern, OGT to LIWS for morning. duonebs/mucomyst to q8 for improving secretions. Changed tube feeds to Jevity 1.5 20cc/hr, low rate due to abdominal distention, nepro dense and more difficult to digest. Tolerating CPAP, confirmed by ABG.   10/10: BD 10. GEORGE overnight. Neuro stable. (+) agbriel for urinary retention on bladder scan. inc TF to goal rate of 40cc/hr. family leaning toward pursuing trach/PEG. 1/2 amp for FS 81.   10/11: BD 11. GEORGE overnight. Neuro stable. Trach/PEG consults placed.   10/12: BD 12. GEORGE overnight. Neuro stable. MRI brain complete.   10/13: BD 13. Increase flomax. Hold SQH after PM dose for trach tm. IVL.   10/14: BD 14. GEORGE overnight, remains on AC/VC. Gabriel placed for urinary retention. Dc'd free water.  S/p trach with pulm. NGT placed and CXR confirmed in good position.   10/15: BD 15, GEORGE ovn. resumed feeds. spiked 101, pan cx sent.   10/16: BD 16. GEORGE ovn. Lokelma 5mg for K+ 5.4. Started vanc q 24/zosyn for empiric PNA coverage, IVF to 100/hr. PEG held for fever.   10/17: BD 17,  ordered serum osm and urine osm for am. Started sinemet for neurostimulation. Increased cardura to 0.8. Started FW 100q4, dc'd IVF. MRSA negative, dc'd vanc. NGT replaced d/t coiling.   10/18: BD 18, GEORGE overnight, neuro stable. Amantadine added for neurostim. zosyn changed to unasyn for acinetobacter baumannii, failed TOV and required SC  10/19: BD 19, GEORGE ovn. cardura 2mg added for retention. labetalol decreased 200q8, hydralazine decreased 25q8. Gabriel replaced.   10/20: BD20, GEORGE overnight. NGT dislodged, replaced. PEG tomorrow w/ gen surg, FW increased to 150q4 and labetalol decreased to 100q8, lokelma given for hyperkalemia.   10/21: BD 21. POD0 PEG placement with Gen surg. decr labetolol to 50q8, incr. cardura to 0.4, started lokelma and phoslo, dc gabriel POD0 PEG placement with Gen surg.  10/22: BD 22. Plan to start TF today via PEG. dc labetalol, Following ophtho recs. Increased apnea settings - found to be in cheyne-moe respiration. CPAP 5/5.  10/23: BD 23. hydralazine d/c'd, trach collar trial today. Rectal tube placed at 6am.  10/24: BD24, o/n lokelma held due to diarrhea. Free water 100q6 resumed. dc'd tamsulosin, amantadine. Incr'd cardura to 8mg qhs. Dc'd FW. Switched jevity to nepro. gabriel placed for high urine output. Started SL atropine for oral secretions. Dc'd free water.  10/25: BD25, o/n decreased suctioning requirements to > q4hrs, GEORGE. Cr improving, cont phoslo, lokelma held at this time. Gabriel placed yest, cont. Tolerating trach collar. Given 500cc plasmalyte bolus for ANIKA. Dc'd sinemet.   10/26: BD26, o/n resumed lokelma 5mg daily and resumed 100cc free water q6hrs. Change in neuro status with new right pupillary dilation with anisocoria (right pupil 6mm fixed and left pupil 3mm briskly reactive). Given 23.4% NaCl bullet, taken for emergent CTH showing mostly resolved pontine hemorrhage, continued brainstem hypodensity likely edema d/t hemorrhage, no new hemorrhage or infarct, no herniation, mild increase in size of left lateral ventricle. Vitals remaine stable. Na goal > 140.   10/27: BD27, o/n GEORGE.Neuro stable. Pend stepdown with airway bed.   10/28: BD 28. GEORGE overnight. Neuro stable. Miralax ordered. Gabriel removed, pending TOV.  10/29: BD 29. GEORGE o/n. Given 2L NS over 8 hrs for increased BUN/Cr ratio. Gabriel placed for frequent straight cath.   10/30: BD 30.   10/31: BD 31. GEORGE overnight. Na 149, increased free water to 200q6. 1L NS for uptrending BUN.   11/1: BD 32. GEORGE overnight. Given 1L NS for dehydration. Na 146, increased FW to 250q6.   11/2: BD 33 GEORGE overnight, neuro stable, given 500cc bolus for net negative status and tachycardia   11/3: BD 34, GEORGE overnight, neuro stable. Patient remains tachycardic, EKG showing sinus tachycardia, given additional 500cc NS bolus. Febrile to 101.9F, pan cultured (without UA), CXR WNL, given tylenol.   11/4: BD 35, GEORGE overnight, neuro stable. Given 1L NS for tachycardia. sputum (+) for stenotropohomas maltophilia.   11/5: BD 36 GEORGE overnight, neuro stable. Vancomycin dc'd. Chest PT BID. ID consulted, cont zosyn.  11/6: BD 37. blood cx + klebsiella dc zosyn changed to cefepime, CTAP ordered, rpt blood cx sent.    11/7: BD 38. Pending CT A/P, given 250cc bolus and starting maintenance fluids overnight. Pending CT A/P after bolus   11/8: BD 39. CT CAP negative for infection.   11/9: BD 40. GEORGE overnight.  11/10: BD 41. GEORGE overnight. desat to 85 on trach collar, O2 inc to 10L and 100%, O2 sat inc to 95. pt tachy to 110s, euvolemic. given tylenol. ABG and CXR ordered. spiked fever, pancultured, RVP negative. AM ABG w pO2 79, rpt w pO2 79. pt appears comfortable, satting 94%.   11/11: BD 42. GEORGE overnight. pt became tachy to 130s, desat to 90 on 100% FiO2 and 10L. suctioned, (+) productive cough. temp 101.4, given 1g IV tylenol and 500cc NS bolus for euvolemia. fever and HR downtrending. LE dopplers negative for dvt  11/12: BD 43, GEORGE ovn, fever and HR downtrending, satting 97% 70% FIO2  11/13: BD 44, GEORGE ovn. started standing tylenol x24 hours for tachycardia. desat to 80s, o2 increased. CXR stable, pending CTA PE protocol.   11/14: BD 45, GEORGE overnight, neuro stable. resp therapy dec FiO2 to 70%.   11/15: BD 46, GEORGE overnight, neuro stable.  Rapid called for desaturation 30s, tachycardic 140s. Patient bagged, 100% fio2, heavily suctioned. CXR/POCUS unremarkable. ABG c/w desaturation. WBC 14.71. Afebrile. O2 improved to 90s and patient upgraded to ICU. ABG paO2 30s improved to 89 on vent. IV Tylenol x 1, sputum sent. Start protonix while o-n vent.   11/16: BD 47. POCUS showed collapsable IVCF, given 1L bolus. Vanco/Cefpime added empriic for PNA, NGT feeds restarted. MRSA swab neg, Vanc DC'd.   11/17: BD 48. GEORGE overnight. 1l bolus for tachycardia. Spiked to 101, cultured. 500cc bolus for tachycardia, tachy to 148 given 25mcg fentanyl, 250cc albumin, 1.5L bolus. 5 IV lopressor with response HR to 100s. +Stenotrophomonas on sputum cx.   11/18: BD 49. GEORGE overnight. Consulted ID, cefepime switched to bactrim x7days. Started hydrochlorothiazine 12.5mg daily.  11/19: BD 50. Tachy 120s, given tylenol and 500cc NS. Tolerating 5/5, switched to TCx. Holding phos binder. D/c Bactrim. D/c gabriel, f/u TOV. Dc'd PPI.   11/20: BD 51. GEORGE ovn. 1600 satting low 90s, mildly tachy to 110s, afebrile, RR wnl. O2 improved to mid 90s while inc O2 to 100% on TCx. CPAP 5/5 placed back on.  11/21: BD 52, GEORGE ovn, tolerating CPAP 5/5. Switch to trach collar during the day if tolerating well. HCTZ held for Cr bump, straight cath frequence increased to q4  11/22: BD 53, GEORGE ovn. Resumed phoslo. Gabriel placed. Resumed HCTZ.   11/23: BD 54. Holding tylenol in setting of possible fever, will require pan cx if febrile. Cr improved today. Cont CPAP. Bowel regimen held i/s/o diarrhea. FOBT negative.  11/24: BD 55. GEORGE overnight. Neuro stable. HCTZ dc'ed, started lisinopril 5. Lokelma dc'ed for K 3.7.   11/25: BD 56, GEORGE overnight. Neuro stable. dc'd lisinopril 5mg. Gabriel dc'd. TOV. 1545 noted to be hypotensive, MAP 50, in supine position on chair, HR 60s, afebrile, O2 96%. Given 1L cc NS bolus, placed back on bed in reverse trendelenberg, improved to map of 66. Neostick at bedside. Vitals check q1h. Dc'ed amlodipine. Failed TOV, bladder scan q6, sc prn. Added back Senna.   11/26: BD 57, GEORGE overnight. Neuro stable. Dc'd phoslo.   11/27: BD 58, GEORGE overnight. Neuro stable.    11/28: BD 59. Gabriel replaced.   11/29: GEORGE.  11/30: GEORGE, neuro stable.   12/1: BD 62, GEORGE overnight  12/2: BD 63, GEORGE overnight.; Given 1L bolus of LR for uptrending BUN/Cr.  12/3: BD 64. Reinstated eye gtt/moisture chamber given increased Rt eye injection  12/4: BD 65. GEORGE overnight. Attempted to speak with ophtho regarding eyelid weight/closure but no answer, full mailbox.   12/5: BD 66, GEORGE overnight, bowel regimen increased and had BM.   12/6: BD 67, GEORGE overnight, neuro stable.  12/7: GEORGE overnight, neuro stable. /110s, given x1 hydralazine 10 mg IVP. Restarted home amlodipine 5mg.  12/8: GEORGE. OOB to chair.     12/11: GEORGE, mucomyst added for thick secretions, simethicone for abd distension, abd xray with stool burden, increased bowel regimen.   12/12: GEORGE overnight.   12/13: GEORGE overnight.   12/14: GEORGE overnight  12/15: o/n Patient became tachycardic HR 120s and 10 minutes later O2 sat dropped as low as 89%. Patient suctioned without improvement in O2 sat and tube feeds found in suction catheter. TFs held.  STAT CXR ordered. STAT labs sent. Respiratory therapist called to bedside and patient trach connected to ventilator. After connection to ventilator and further suctioning O2 sat improved to 97% but patient HR remains 120-130s. Upgraded to NSICU for further management. Vancomycin and zosyn started. CTA  chest PE protocol and CTH ordered. Blood cultures sent.given 500cc bolus, rpt ABG sent pO2 243, CTH and CTA chest done. FS while NPO, FiO2 dec 50 pending ABG. sputum cx positive for few GPC and GNR.   12/16: GEORGE overnight, restarted amlodipine, troponin 75   12/17: GEORGE overnight, neuro stable. Attempted CPAP this morning, did not tolerate and back on full vent support. Dc'd Vanc. Tachycardia to 140s, noted extremities to be twitching along with jaw twitching. Given 2g of Keppra, total 4mg ativan and placed on EEG, full set of labs and lactate negative. Resumed trickle feeds at 20cc/hr. Given 250cc albumin for tachycardia.   12/18: GEORGE overnight. Neuro stable. CTH stable. EEG negative and dc'd.   12/19: GEORGE overnight. neuro stable. SIMV most of day, AC/VC at night.   12/20: GEORGE overnight, neuro stable. remains on VC/AC  12/21: GEORGE ovn. 1L bolus for tachy to 120s-130s.   12/22: GEORGE ovn. Tachy to 120s, given tylenol and IVF. 2mg IV ativan given for L jaw twitching. Sx resolved for 3 minutes and started again. Epilepsy contacted. Given 2mg IV ativan. Continued twitching. Increased keppra to 1500mg BID, 2mg ativan given. Propranolol started for refractory tachycardia. vEEG ordered. albumin given. POCUS performed, no b lines. Started on fosphenytoin, loaded w 20mg/kg then 100mg TID. febrile, pancx, started cefepime 2g q8, vancomycin  12/23: GEORGE ovn. +focal motor seizures on eeg. ID consulted. Vancomycin dc'ed as per ID.  12/24: GEORGE ovn, neuro stable. Baclofen 5 mg q12 started for hiccups. Na 129 from 134. Urine lytes c/w SIADH. Given 250cc 3% bolus. CT chest for infection w/u - f/u read. Repeat Na 136. UA negative  12/25: GEORGE ovn.   12/26: GEORGE ovn  12/27: GEORGE overnight. Blood cx neg @ 4 days, DC cefepime per medicine (sputum colonized).   12/28: Desaturation o/n to 80's, CXR obtained, pulse ox changed and sats resolved. Na 133 from 138, 250cc 3% bolus given. Cefepime resumed until 12/30 per ID. Rpt Na 138.  12/29: GEORGE overnight. Na stable.     OVERNIGHT EVENTS: GEORGE overnight.     Vital Signs Last 24 Hrs  T(C): 37.1 (29 Dec 2024 22:11), Max: 37.1 (29 Dec 2024 08:58)  T(F): 98.8 (29 Dec 2024 22:11), Max: 98.8 (29 Dec 2024 22:11)  HR: 82 (29 Dec 2024 23:56) (74 - 103)  BP: 107/62 (29 Dec 2024 23:56) (106/66 - 152/98)  BP(mean): 78 (29 Dec 2024 23:56) (78 - 119)  RR: 20 (29 Dec 2024 23:56) (14 - 20)  SpO2: 99% (29 Dec 2024 23:56) (93% - 100%)    Parameters below as of 29 Dec 2024 23:56  Patient On (Oxygen Delivery Method): ventilator    O2 Concentration (%): 40    I&O's Summary    28 Dec 2024 07:01  -  29 Dec 2024 07:00  --------------------------------------------------------  IN: 2010 mL / OUT: 1875 mL / NET: 135 mL    29 Dec 2024 07:01  -  30 Dec 2024 00:00  --------------------------------------------------------  IN: 2479.8 mL / OUT: 1075 mL / NET: 1404.8 mL    PHYSICAL EXAM:  Constitutional: NAD, resting comfortably in bed.   HEENT: Pupils equal, round, reactive to light  Respiratory: +Trach to vent. No respiratory distress, symmetric chest rise   Cardiovascular: RRR, S1, S2.   Gastrointestinal: Abdomen soft, non tender, nondistended.  Neurological:  AAOX0. Opens eyes to voice, tracks vertically.   Motor: RUE trace HG 1/5. LUE 0/5, BL LE 0 vs TF to noxious   Sensation: unable to assess d/t mental status  Extremities: Warm, well perfused.  Wounds: none    DIET:  [] NPO  [] Mechanical  [X] Tube feeds    LABS:                        10.9   9.21  )-----------( 267      ( 29 Dec 2024 05:30 )             33.8     12-29    139  |  106  |  47[H]  ----------------------------<  137[H]  3.8   |  20[L]  |  1.24    Ca    9.6      29 Dec 2024 05:30  Phos  2.3     12-29  Mg     2.2     12-29        Urinalysis Basic - ( 29 Dec 2024 05:30 )    Color: x / Appearance: x / SG: x / pH: x  Gluc: 137 mg/dL / Ketone: x  / Bili: x / Urobili: x   Blood: x / Protein: x / Nitrite: x   Leuk Esterase: x / RBC: x / WBC x   Sq Epi: x / Non Sq Epi: x / Bacteria: x    Drug Levels: [] N/A  Phenytoin Level, Serum: 12.2 ug/mL (12-24 @ 05:30)    Allergies    Allergy Status Unknown    Intolerances      MEDICATIONS:  Antibiotics:  cefepime   IVPB 2000 milliGRAM(s) IV Intermittent every 8 hours    Neuro:  acetaminophen   Oral Liquid .. 650 milliGRAM(s) Oral every 6 hours PRN  baclofen 5 milliGRAM(s) Oral every 12 hours  fosphenytoin IVPB 100 milliGRAM(s) PE IV Intermittent three times a day  levETIRAcetam 1500 milliGRAM(s) Oral two times a day    Anticoagulation:  heparin   Injectable 5000 Unit(s) SubCutaneous every 8 hours    OTHER:  acetylcysteine 10%  Inhalation 4 milliLiter(s) Inhalation every 6 hours  albuterol/ipratropium for Nebulization 3 milliLiter(s) Nebulizer every 6 hours  amLODIPine   Tablet 5 milliGRAM(s) Oral daily  artificial tears (preservative free) Ophthalmic Solution 1 Drop(s) Right EYE every 4 hours  chlorhexidine 0.12% Liquid 15 milliLiter(s) Oral Mucosa every 12 hours  chlorhexidine 2% Cloths 1 Application(s) Topical <User Schedule>  doxazosin 8 milliGRAM(s) Oral at bedtime  erythromycin   Ointment 1 Application(s) Right EYE at bedtime  fluticasone propionate 50 MICROgram(s)/spray Nasal Spray 1 Spray(s) Both Nostrils two times a day  ofloxacin 0.3% Solution 1 Drop(s) Right EYE four times a day  pantoprazole  Injectable 40 milliGRAM(s) IV Push daily  petrolatum Ophthalmic Ointment 1 Application(s) Both EYES two times a day  propranolol 10 milliGRAM(s) Oral every 8 hours  sodium chloride 0.65% Nasal 1 Spray(s) Both Nostrils two times a day    CULTURES:  Culture Results:   Numerous Klebsiella aerogenes (Previously Enterobacter)  Numerous Stenotrophomonas maltophilia  Commensal iram consistent with body site (12-25 @ 20:05)  Culture Results:   No growth at 5 days (12-22 @ 17:45)    ASSESSMENT:  45 y/o PMH ?stroke/MI present to [facility] after collapsing at work. Decorticate posturing, vomiting, intubated for airway protection. 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 Transferred to [facility] for further management. s/p trach 10/14. s/p peg 10/21. Re-upgrade to ICU 2/2 desaturation event and suctioning requirements 11/15. Re-upgrade to NSICU 12/15 2/2 desaturation and tachycardia.    AMS    Handoff    MEWS Score    Acute myocardial infarction    CVA (cerebral vascular accident)    Intracerebral hemorrhage of brain stem    Brainstem stroke    Brain stem stroke syndrome    Brain stem hemorrhage    Brain stem stroke syndrome    Hemorrhagic stroke    Brainstem stroke    Encephalopathy acute    Functional quadriplegia    Advanced care planning/counseling discussion    Encounter for palliative care    Pontine hemorrhage    Neurogenic dysphagia    Chronic respiratory failure    Acute kidney injury superimposed on CKD    Acute urinary retention    Hypertensive emergency    Sepsis, unspecified organism    Sepsis    Gram-negative bacteremia    Dyspnea    Percutaneous tracheal puncture    Altered mental status examination    EGD, with PEG    AMS    Room Service Assist    [email or web address]    PLAN:  Neuro:  - neuro/vitals q4h  - pain control: tylenol prn  - seizure tx: keppra 1500mg BID, fosphenytoin 100mg TID  - vEEG (10/3-4) negative, (10/17-10/19) negative, (12/17-12/18) negative, (12/22-12/25 ) +focal motor seizures not correlating w/ EEG  - epilepsy following  - CTH 9/30: enlarged pontine hemorrhage, CTH 10/3: stable, CTH 10/25: mostly resolved pontine hemorrhage, CTH 12/15: R mastoid air cell opacification; acute otitis media vs sterile effusion, CTH 12/18: stable.  - MRI brain 10/12: parenchymal hemorrhage, acute/subacute R cerebellar stroke    - stroke neuro signed off    CV:  - -160  - tachycardia: propranolol 10mg q8  - HTN: amlodipine 5mg  - echo (9/30) EF 75%, repeat 12/16: 57%    Resp:  - trach to vent, AC/VC 40/400/14/6  - CTA PE protocol 12/15 negative   - Secretions: duonebs/mucomyst/chest PT q6h   - CT Chest 12/24 for infectious w/u: b/l LL atelectasis and opacities     GI:  - TF via PEG (placed 10/21 by gen surg)  - bowel regimen held for loose stool, last BM 12/26  - baclofen 5q12 for hiccups     Renal:  - IVL  - Urinary retenion: Gabriel replaced 12/15  - CKD: trend BUN/Cr  - renal US 10/1: echogenicity c/w chronic med renal dz, repeat 10/8: inc renal echogeicity, c/f medical renal disease w increased hydro     Endo:  - A1c 5.4    Heme:  - DVT ppx: SCDs, SQH 5000u q8h   - LE dopplers negative 12/16    ID:  - last pan cx 12/22  - empiric PNA: cefepime (12/22-12/30), s/p vanc (12/22-12/23), s/p zosyn (12/15-12/20), s/p vanc (12/15-12/16)  - s/p Stenotrophomonas maltophilia PNA: s/p Bactrim (11/18-11/19) s/p Cefepime (11/16-11/18)  - 11/3, (+) sputum for stenotrophomas maltophlia, blood cx (+) klebsiella, cefepime 2gq12 (11/6 - 11/12)   - empiric tx: s/p zosyn (11/3-11/6) , s/p vanc  (11/3-11/5)  - S/p Ancef (10/4-10/14) for PNA, and s/p Unasyn (10/18-10/23) +actinobacter baumanii     MISC:  - ophtho consult for keratitis  - erythromycin ointment R eye q4hrs, ofloxacin ointment R eye QID, artificial tears R eye q2hrs, moisture chamber at bedtime    Dispo: SDU status, full code, pending placement     D/w Dr. D'Amico

## 2024-12-31 LAB
HCT VFR BLD CALC: 31.2 % — LOW (ref 39–50)
HGB BLD-MCNC: 9.9 G/DL — LOW (ref 13–17)
MAGNESIUM SERPL-MCNC: 2.1 MG/DL — SIGNIFICANT CHANGE UP (ref 1.6–2.6)
MCHC RBC-ENTMCNC: 29.8 PG — SIGNIFICANT CHANGE UP (ref 27–34)
MCHC RBC-ENTMCNC: 31.7 G/DL — LOW (ref 32–36)
MCV RBC AUTO: 94 FL — SIGNIFICANT CHANGE UP (ref 80–100)
NRBC # BLD: 0 /100 WBCS — SIGNIFICANT CHANGE UP (ref 0–0)
NRBC BLD-RTO: 0 /100 WBCS — SIGNIFICANT CHANGE UP (ref 0–0)
PHOSPHATE SERPL-MCNC: 3.6 MG/DL — SIGNIFICANT CHANGE UP (ref 2.5–4.5)
PLATELET # BLD AUTO: 239 K/UL — SIGNIFICANT CHANGE UP (ref 150–400)
RBC # BLD: 3.32 M/UL — LOW (ref 4.2–5.8)
RBC # FLD: 14.3 % — SIGNIFICANT CHANGE UP (ref 10.3–14.5)
WBC # BLD: 8.63 K/UL — SIGNIFICANT CHANGE UP (ref 3.8–10.5)
WBC # FLD AUTO: 8.63 K/UL — SIGNIFICANT CHANGE UP (ref 3.8–10.5)

## 2024-12-31 PROCEDURE — 99233 SBSQ HOSP IP/OBS HIGH 50: CPT

## 2024-12-31 PROCEDURE — 99231 SBSQ HOSP IP/OBS SF/LOW 25: CPT

## 2024-12-31 RX ORDER — SODIUM PHOSPHATE,DIBASIC DIHYD
15 POWDER (GRAM) MISCELLANEOUS ONCE
Refills: 0 | Status: COMPLETED | OUTPATIENT
Start: 2024-12-31 | End: 2024-12-31

## 2024-12-31 RX ADMIN — HEPARIN SODIUM 5000 UNIT(S): 1000 INJECTION INTRAVENOUS; SUBCUTANEOUS at 05:51

## 2024-12-31 RX ADMIN — Medication 15 MILLILITER(S): at 05:51

## 2024-12-31 RX ADMIN — OFLOXACIN 1 DROP(S): 3 SOLUTION OPHTHALMIC at 17:38

## 2024-12-31 RX ADMIN — IPRATROPIUM BROMIDE AND ALBUTEROL SULFATE 3 MILLILITER(S): .5; 2.5 SOLUTION RESPIRATORY (INHALATION) at 23:13

## 2024-12-31 RX ADMIN — HEPARIN SODIUM 5000 UNIT(S): 1000 INJECTION INTRAVENOUS; SUBCUTANEOUS at 21:01

## 2024-12-31 RX ADMIN — FLUTICASONE PROPIONATE 1 SPRAY(S): 50 SPRAY, METERED NASAL at 05:53

## 2024-12-31 RX ADMIN — IPRATROPIUM BROMIDE AND ALBUTEROL SULFATE 3 MILLILITER(S): .5; 2.5 SOLUTION RESPIRATORY (INHALATION) at 17:36

## 2024-12-31 RX ADMIN — Medication 1 APPLICATION(S): at 05:52

## 2024-12-31 RX ADMIN — BACLOFEN 5 MILLIGRAM(S): 10 INJECTION INTRATHECAL at 05:51

## 2024-12-31 RX ADMIN — AMLODIPINE BESYLATE 5 MILLIGRAM(S): 10 TABLET ORAL at 05:52

## 2024-12-31 RX ADMIN — ERYTHROMYCIN 1 APPLICATION(S): 5 OINTMENT OPHTHALMIC at 21:06

## 2024-12-31 RX ADMIN — Medication 1 DROP(S): at 17:38

## 2024-12-31 RX ADMIN — ACETYLCYSTEINE 4 MILLILITER(S): 200 INHALANT RESPIRATORY (INHALATION) at 05:51

## 2024-12-31 RX ADMIN — ACETYLCYSTEINE 4 MILLILITER(S): 200 INHALANT RESPIRATORY (INHALATION) at 23:13

## 2024-12-31 RX ADMIN — Medication 1 SPRAY(S): at 05:53

## 2024-12-31 RX ADMIN — ACETYLCYSTEINE 4 MILLILITER(S): 200 INHALANT RESPIRATORY (INHALATION) at 17:37

## 2024-12-31 RX ADMIN — BACLOFEN 5 MILLIGRAM(S): 10 INJECTION INTRATHECAL at 17:37

## 2024-12-31 RX ADMIN — OFLOXACIN 1 DROP(S): 3 SOLUTION OPHTHALMIC at 23:15

## 2024-12-31 RX ADMIN — Medication 1 APPLICATION(S): at 17:39

## 2024-12-31 RX ADMIN — FOSPHENYTOIN SODIUM 104 MILLIGRAM(S) PE: 50 INJECTION INTRAMUSCULAR; INTRAVENOUS at 21:01

## 2024-12-31 RX ADMIN — Medication 1 DROP(S): at 21:06

## 2024-12-31 RX ADMIN — Medication 1 DROP(S): at 01:40

## 2024-12-31 RX ADMIN — OFLOXACIN 1 DROP(S): 3 SOLUTION OPHTHALMIC at 05:52

## 2024-12-31 RX ADMIN — Medication 1 APPLICATION(S): at 05:53

## 2024-12-31 RX ADMIN — Medication 1 DROP(S): at 09:51

## 2024-12-31 RX ADMIN — Medication 15 MILLILITER(S): at 17:44

## 2024-12-31 RX ADMIN — IPRATROPIUM BROMIDE AND ALBUTEROL SULFATE 3 MILLILITER(S): .5; 2.5 SOLUTION RESPIRATORY (INHALATION) at 11:27

## 2024-12-31 RX ADMIN — Medication 1 DROP(S): at 05:52

## 2024-12-31 RX ADMIN — FLUTICASONE PROPIONATE 1 SPRAY(S): 50 SPRAY, METERED NASAL at 17:40

## 2024-12-31 RX ADMIN — OFLOXACIN 1 DROP(S): 3 SOLUTION OPHTHALMIC at 11:26

## 2024-12-31 RX ADMIN — FOSPHENYTOIN SODIUM 104 MILLIGRAM(S) PE: 50 INJECTION INTRAMUSCULAR; INTRAVENOUS at 05:54

## 2024-12-31 RX ADMIN — LEVETIRACETAM 1500 MILLIGRAM(S): 10 INJECTION, SOLUTION INTRAVENOUS at 17:37

## 2024-12-31 RX ADMIN — DOXAZOSIN MESYLATE 8 MILLIGRAM(S): 8 TABLET ORAL at 21:01

## 2024-12-31 RX ADMIN — Medication 62.5 MILLIMOLE(S): at 11:26

## 2024-12-31 RX ADMIN — LEVETIRACETAM 1500 MILLIGRAM(S): 10 INJECTION, SOLUTION INTRAVENOUS at 05:51

## 2024-12-31 RX ADMIN — ACETYLCYSTEINE 4 MILLILITER(S): 200 INHALANT RESPIRATORY (INHALATION) at 11:27

## 2024-12-31 RX ADMIN — Medication 40 MILLIGRAM(S): at 11:28

## 2024-12-31 RX ADMIN — Medication 1 DROP(S): at 14:12

## 2024-12-31 RX ADMIN — Medication 1 SPRAY(S): at 17:39

## 2024-12-31 RX ADMIN — IPRATROPIUM BROMIDE AND ALBUTEROL SULFATE 3 MILLILITER(S): .5; 2.5 SOLUTION RESPIRATORY (INHALATION) at 05:51

## 2024-12-31 RX ADMIN — FOSPHENYTOIN SODIUM 104 MILLIGRAM(S) PE: 50 INJECTION INTRAMUSCULAR; INTRAVENOUS at 14:13

## 2024-12-31 RX ADMIN — HEPARIN SODIUM 5000 UNIT(S): 1000 INJECTION INTRAVENOUS; SUBCUTANEOUS at 14:12

## 2024-12-31 NOTE — PROGRESS NOTE ADULT - ASSESSMENT
44M with unclear PMH (?stroke, MI) who was found down at work, intubated for airway protection and found to have acute parenchymal hemorrhage within nabil with mass effect (+ acute/subacute right cerebellar infarct) in setting of hypertensive emergency, transferred to [facility] for further neurosurgical care. Hospital course c/b poor neurologic recovery s/p trach-PEG, AUR s/p gabriel, ANIKA on CKD c/b hyperkalemia, HAP s/p amp-sulbactam (EOT 10/23), K aerogenes bacteremia  treated with 2 weeks of Cefepime per ID , On 11/15 he became septic and was transferred to NSICU due to increased o2 requirements and needed Vent support , Trach Cultures + for Stenotrophomonas which was treated with 7 days of Bactrim per ID , has been weaned of Vent since 11/23 and is now on 10lts at 40% o2 through Trach Collar. Stepped up to the NSICU on 12/15 for hypoxia and tachycardia. PE ruled out. On abx for 5 days. Unclear etiology. Now stepped down to 8Lach.    # Pontine hemorrhage  # Encephalopathy due to Intracranial Bleed   - Acute parenchymal hemorrhage within nabil with mass effect (+ acute/subacute right cerebellar infarct) in setting of hypertensive emergency.   - MRI brain also demonstrated acute/subacute R cerebellar stroke.   - No Neurosurgical Interventions were performed   - due to IPH and cerebellar stroke patient has become Trach and Peg Dependent    # Goals of Care   - Given recurrent infections and now with Refractory Seizures , Vent Dependence palliative care was reconsulted  - Patient is currently DNR but will proceed without de-escalation at this time    # Hyponatremia RESOLVED - 139 this morning.   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    # Seizures  - f/u vEEG and epilepsy Recommendations   - on Keppra and Fosphenytoin     # Suspected Hospital Acquired Pneumonia   - CT Chest  with evidence of Bilateral lower lobe infiltrates   - Per ID continue Cefepime until 12/30 , Monitor blood cx and trach cultures       # Hypertension  - c/w amlodipine 5mg daily     # Acute kidney injury  # Acute Tubular Necrosis superimposed on CKD. 3  - Pt s/p US renal with chronic medical renal disease  - Elevated BUN , Not on any meds that can elevate BUN , Hgb Stable   - Cr 1.24 within pt baseline range, ctm     # Chronic respiratory failure.   -Pt s/p percutaneous trach by pulm on 10/14/24  - Currently on Vent Support , No Increase in O2 requirement   - Chest PT    # Neurogenic dysphagia.   - Pt s/p PEG with surgery 10/21/24  - TF per nutrition  - aspiration precautions and elevation of HOB.    #Acute urinary retention.   - doxazosin 8mg    # Functional quadriplegia in  setting of brainstem hemorrhage  - decub precautions  - care per nursing protocol     # DVT prop  - Heparin SQ q8     # Dispo:   Pending [place] , Court Appointed Guardian Jason Pierson

## 2024-12-31 NOTE — PROGRESS NOTE ADULT - ASSESSMENT
43yo M w/ reported PMHx of MI and previous strokes, who was admitted to [facility] on 09/30 for AMS, found to have acute large parenchymal hemorrhage within nabil with mass effect (+ acute/subacute right cerebellar infarct) in setting of hypertensive emergency, and R cerebellar stroke , c/b poor neurologic recovery, now trach/vent dependent/PEG, with recurrent fever/infections and ICU readmissions a/w focal status epilepticus. 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

## 2024-12-31 NOTE — PROGRESS NOTE ADULT - SUBJECTIVE AND OBJECTIVE BOX
O/N Events:  Subjective/ROS: Denies HA, CP, SOB, n/v, changes in bowel/urinary habits.  12pt ROS otherwise negative.    VITALS  Vital Signs Last 24 Hrs  T(C): 36.8 (31 Dec 2024 09:00), Max: 37 (30 Dec 2024 22:00)  T(F): 98.2 (31 Dec 2024 09:00), Max: 98.6 (30 Dec 2024 22:00)  HR: 64 (31 Dec 2024 08:25) (64 - 68)  BP: 121/82 (31 Dec 2024 08:15) (110/65 - 127/80)  BP(mean): 98 (31 Dec 2024 08:15) (82 - 100)  RR: 18 (31 Dec 2024 08:15) (14 - 18)  SpO2: 100% (31 Dec 2024 08:25) (99% - 100%)    Parameters below as of 31 Dec 2024 08:25  Patient On (Oxygen Delivery Method): ventilator        I&O's Summary    30 Dec 2024 07:01  -  31 Dec 2024 07:00  --------------------------------------------------------  IN: 1940 mL / OUT: 1550 mL / NET: 390 mL        CAPILLARY BLOOD GLUCOSE          PHYSICAL EXAM  General: comfortable-appearing, no WOB on trach collar  HEENT: tracheostomy clean  Lungs: no crackles, no wheezes  Heart: regular  Abdomen: soft, no visible tenderness, + BS, +PEG  Extremities: warm, no edema, not moving to command      MEDICATIONS  (STANDING):  acetylcysteine 10%  Inhalation 4 milliLiter(s) Inhalation every 6 hours  albuterol/ipratropium for Nebulization 3 milliLiter(s) Nebulizer every 6 hours  amLODIPine   Tablet 5 milliGRAM(s) Oral daily  artificial tears (preservative free) Ophthalmic Solution 1 Drop(s) Right EYE every 4 hours  baclofen 5 milliGRAM(s) Oral every 12 hours  chlorhexidine 0.12% Liquid 15 milliLiter(s) Oral Mucosa every 12 hours  chlorhexidine 2% Cloths 1 Application(s) Topical <User Schedule>  doxazosin 8 milliGRAM(s) Oral at bedtime  erythromycin   Ointment 1 Application(s) Right EYE at bedtime  fluticasone propionate 50 MICROgram(s)/spray Nasal Spray 1 Spray(s) Both Nostrils two times a day  fosphenytoin IVPB 100 milliGRAM(s) PE IV Intermittent three times a day  heparin   Injectable 5000 Unit(s) SubCutaneous every 8 hours  levETIRAcetam 1500 milliGRAM(s) Oral two times a day  ofloxacin 0.3% Solution 1 Drop(s) Right EYE four times a day  pantoprazole  Injectable 40 milliGRAM(s) IV Push daily  petrolatum Ophthalmic Ointment 1 Application(s) Both EYES two times a day  propranolol 10 milliGRAM(s) Oral every 8 hours  sodium chloride 0.65% Nasal 1 Spray(s) Both Nostrils two times a day    MEDICATIONS  (PRN):  acetaminophen   Oral Liquid .. 650 milliGRAM(s) Oral every 6 hours PRN Temp greater or equal to 38.5C (101.3F), Mild Pain (1 - 3)      Allergy Status Unknown      LABS                        9.9    8.63  )-----------( 239      ( 31 Dec 2024 05:30 )             31.2     12-31    133[L]  |  102  |  49[H]  ----------------------------<  103[H]  3.8   |  21[L]  |  1.32[H]    Ca    9.2      31 Dec 2024 05:30  Phos  3.6     12-31  Mg     2.1     12-31        Urinalysis Basic - ( 31 Dec 2024 05:30 )    Color: x / Appearance: x / SG: x / pH: x  Gluc: 103 mg/dL / Ketone: x  / Bili: x / Urobili: x   Blood: x / Protein: x / Nitrite: x   Leuk Esterase: x / RBC: x / WBC x   Sq Epi: x / Non Sq Epi: x / Bacteria: x            IMAGING/EKG/ETC: reviewed

## 2024-12-31 NOTE — PROGRESS NOTE ADULT - SUBJECTIVE AND OBJECTIVE BOX
[facility]  Silvia Goldsmith, Geriatrics & Palliative Care NP  Contact Info: Call [phone] (HEAL Line) or message on [employer] Teams    SUBJECTIVE/OBJECTIVE:  Interval events reviewed. Pt seen and examined at bedside. Pt appears comfortable. Opens eyes to voice. Appears to track vertically and raise eyebrows on command.    ALLERGIES: Allergy Status Unknown      MEDICATIONS: REVIEWED  MEDICATIONS  (STANDING):  acetylcysteine 10%  Inhalation 4 milliLiter(s) Inhalation every 6 hours  albuterol/ipratropium for Nebulization 3 milliLiter(s) Nebulizer every 6 hours  amLODIPine   Tablet 5 milliGRAM(s) Oral daily  artificial tears (preservative free) Ophthalmic Solution 1 Drop(s) Right EYE every 4 hours  baclofen 5 milliGRAM(s) Oral every 12 hours  chlorhexidine 0.12% Liquid 15 milliLiter(s) Oral Mucosa every 12 hours  chlorhexidine 2% Cloths 1 Application(s) Topical <User Schedule>  doxazosin 8 milliGRAM(s) Oral at bedtime  erythromycin   Ointment 1 Application(s) Right EYE at bedtime  fluticasone propionate 50 MICROgram(s)/spray Nasal Spray 1 Spray(s) Both Nostrils two times a day  fosphenytoin IVPB 100 milliGRAM(s) PE IV Intermittent three times a day  heparin   Injectable 5000 Unit(s) SubCutaneous every 8 hours  levETIRAcetam 1500 milliGRAM(s) Oral two times a day  ofloxacin 0.3% Solution 1 Drop(s) Right EYE four times a day  pantoprazole  Injectable 40 milliGRAM(s) IV Push daily  petrolatum Ophthalmic Ointment 1 Application(s) Both EYES two times a day  propranolol 10 milliGRAM(s) Oral every 8 hours  sodium chloride 0.65% Nasal 1 Spray(s) Both Nostrils two times a day    MEDICATIONS  (PRN):  acetaminophen   Oral Liquid .. 650 milliGRAM(s) Oral every 6 hours PRN Temp greater or equal to 38.5C (101.3F), Mild Pain (1 - 3)        Analgesic Use (Scheduled and PRNs) for past 24 hours (6a-6a):      baclofen   5 milliGRAM(s) Oral (12-31-24 @ 17:37)   5 milliGRAM(s) Oral (12-31-24 @ 05:51)    fosphenytoin IVPB   104 mL/Hr IV Intermittent (12-31-24 @ 21:01)   104 mL/Hr IV Intermittent (12-31-24 @ 14:13)   104 mL/Hr IV Intermittent (12-31-24 @ 05:54)    levETIRAcetam   1500 milliGRAM(s) Oral (12-31-24 @ 17:37)   1500 milliGRAM(s) Oral (12-31-24 @ 05:51)    ROS: EBER, pt noncontributory.    T(C): 36.8 (12-31-24 @ 22:27), Max: 36.8 (12-31-24 @ 09:00)  HR: 72 (12-31-24 @ 21:37) (62 - 72)  BP: 123/81 (12-31-24 @ 20:08) (112/74 - 133/82)  RR: 17 (12-31-24 @ 21:37) (14 - 18)  SpO2: 99% (12-31-24 @ 21:37) (99% - 100%)    CONSTITUTIONAL: NAD  Head: atraumatic  ENMT: Oral mucosa moist.    NECK: Supple, symmetric and without tracheal deviation   RESP: Trach to vent. No respiratory distress, no use of accessory muscles; coarse   CV: RRR, +S1S2, no JVD; mild peripheral edema  GI: PEG, Soft, NT, ND, no rebound, no guarding  MSK: ext x4 flaccid  SKIN: intact  NEURO: + blink to threat    T(C): 36.8 (12-31-24 @ 22:27), Max: 36.8 (12-31-24 @ 09:00)  HR: 72 (12-31-24 @ 21:37) (62 - 72)  BP: 123/81 (12-31-24 @ 20:08) (112/74 - 133/82)  RR: 17 (12-31-24 @ 21:37) (14 - 18)  SpO2: 99% (12-31-24 @ 21:37) (99% - 100%)  Wt(kg): --      LABS: REVIEWED  CBC:                        9.9    8.63  )-----------( 239      ( 31 Dec 2024 05:30 )             31.2     CMP:    12-31    133[L]  |  102  |  49[H]  ----------------------------<  103[H]  3.8   |  21[L]  |  1.32[H]    Ca    9.2      31 Dec 2024 05:30  Phos  3.6     12-31  Mg     2.1     12-31        RADIOLOGY & ADDITIONAL STUDIES:     DISCUSSION OF CASE:    CARE COORDINATION:

## 2024-12-31 NOTE — PROGRESS NOTE ADULT - TIME BILLING
Review of hospital course, labs, vitals, medical records.   Bedside exam and interview   Discussed plan of care with primary team ACP and house staff   Documenting the encounter

## 2024-12-31 NOTE — PROGRESS NOTE ADULT - TIME BILLING
Emotional Support/Supportive Care and Clarification of Potential Disease Trajectory related to  Assessment of Symptom [place] and Palliative regimen  Education and Monitoring of Opiates including titration and management of high risk medications  Discharge Facilitation with ongoing coordination  Exploration of GOC/Advanced Directives including HCP designation, code status, and hospice eligibility    Time exclusive of ACP discussion  Time inclusive of chart review, medication ordering, discussion with primary team, clinical documentation, and communication with family/caregiver

## 2024-12-31 NOTE — PROGRESS NOTE ADULT - SUBJECTIVE AND OBJECTIVE BOX
HPI:  Unknown age male, unknown past medical history (reported stroke and MI by coworkers) presented to [facility] with AMS, Pt was working at [employer] when he was found down by coworkers. EMS called and pt brought to [facility] ED. Intubated, sedated, started on cardene for SBPs in 200s. CT head showed brain stem bleed. Transferred to NSICU for further management.  (30 Sep 2024 12:55)    OVERNIGHT EVENTS: GEORGE overnight, neuro stable.     Hospital Course:   9/30: transferred from [facility]. A line placed. Versed dc'd. Cy Rader at bedside, states pt has family in [place], cannot confirm medications or PMH other than stroke and MI. 250cc bolus 3% given. LR switched to NS. hydralazine 25q8 started, 3% started, switched propofol to precedex   10/1: stability CTH done. Added labetalol, started TF. Palliative consulted. ethics consulted to determine surrogate. febrile 103, pan cx sent  10/2: BD 2, GEORGE overnight. TF resumed. Desatt'd to 80s, FiO2 inc. to 50. Fentanyl given, ABG, CXR ordered. Maxxed on precedex, started on propofol for DARIEN -4 - -5. Precedex dc'd. Duonebs, mucomyst, hypertonic added. 3% dc'd. Cardene dc'd. Start vanc/CTX. Increased labetalol 200q8. MRSA negative, dc'd vanc. ETT pulled back 2cm x 2, good positioning after confirmatory chest xray. Ethics attempting to establish HCP with family. Na 159, starting FW 250q6 for range 150-155.   10/3: BD3, GEORGE o/n, neuro stable. Na elevating, FW increased to 300q6. Dc'd bowel reg for diarrhea. vEEG started. SQH 5000q8 tonight.   10/4: BD 4, albumn bolus, incr. LR to 80 2/2 incr. in Cr, LR to 10 0cc/hr for uptrending Cr. Started 7% hypersal for 48hrs and SL atropine for inline/oral thick secretions. Dc'd CTX and started ancef for MSSA in the sputum. Nephrology consulted for CKD, f/u recs. SBP 170s, given hydralazine 10mg IVP.   10/5: BD5, o/n 10mg IVP hydralazine given for SBP 170s and started on hydralazine 25q8 via OGT. 10mg IV push labetalol for SBP > 160s. RT placed for diarrhea.   10/6: BD6, o/n FW increased to 350q4 per nephrology recs. IV tylenol for temp 100.6, SBp 160s presumed uncomfortable.   10/7: BD7, overnight pancultured for temp 101.8F.   10/8: BD8. GEORGE. Cr bumped. decreased LR to 75cc/hr. Adding simethicone ATC. incr hydralazine 50mgTID. Incr labetalol 300mgTID. Na 145, decreased FWF to 250q6. Start precedex. FENa consistent with intrinsic kidney injury. Pend repeat renal US. Retaining up to 1.3L, bladder scans q6, straight cath PRN  10/9: BD 9. GEORGE overnight. Neuro stable. abd xray for distention w non-specific gas pattern, OGT to LIWS for morning. duonebs/mucomyst to q8 for improving secretions. Changed tube feeds to Jevity 1.5 20cc/hr, low rate due to abdominal distention, nepro dense and more difficult to digest. Tolerating CPAP, confirmed by ABG.   10/10: BD 10. GEORGE overnight. Neuro stable. (+) gabriel for urinary retention on bladder scan. inc TF to goal rate of 40cc/hr. family leaning toward pursuing trach/PEG. 1/2 amp for FS 81.   10/11: BD 11. GEORGE overnight. Neuro stable. Trach/PEG consults placed.   10/12: BD 12. GEORGE overnight. Neuro stable. MRI brain complete.   10/13: BD 13. Increase flomax. Hold SQH after PM dose for trach tm. IVL.   10/14: BD 14. GEORGE overnight, remains on AC/VC. Gabriel placed for urinary retention. Dc'd free water.  S/p trach with pulm. NGT placed and CXR confirmed in good position.   10/15: BD 15, GEORGE ovn. resumed feeds. spiked 101, pan cx sent.   10/16: BD 16. GEORGE ovn. Lokelma 5mg for K+ 5.4. Started vanc q 24/zosyn for empiric PNA coverage, IVF to 100/hr. PEG held for fever.   10/17: BD 17,  ordered serum osm and urine osm for am. Started sinemet for neurostimulation. Increased cardura to 0.8. Started FW 100q4, dc'd IVF. MRSA negative, dc'd vanc. NGT replaced d/t coiling.   10/18: BD 18, GEORGE overnight, neuro stable. Amantadine added for neurostim. zosyn changed to unasyn for acinetobacter baumannii, failed TOV and required SC  10/19: BD 19, GEORGE ovn. cardura 2mg added for retention. labetalol decreased 200q8, hydralazine decreased 25q8. Gabriel replaced.   10/20: BD20, GEORGE overnight. NGT dislodged, replaced. PEG tomorrow w/ gen surg, FW increased to 150q4 and labetalol decreased to 100q8, lokelma given for hyperkalemia.   10/21: BD 21. POD0 PEG placement with Gen surg. decr labetolol to 50q8, incr. cardura to 0.4, started lokelma and phoslo, dc gabriel POD0 PEG placement with Gen surg.  10/22: BD 22. Plan to start TF today via PEG. dc labetalol, Following ophtho recs. Increased apnea settings - found to be in cheyne-moe respiration. CPAP 5/5.  10/23: BD 23. hydralazine d/c'd, trach collar trial today. Rectal tube placed at 6am.  10/24: BD24, o/n lokelma held due to diarrhea. Free water 100q6 resumed. dc'd tamsulosin, amantadine. Incr'd cardura to 8mg qhs. Dc'd FW. Switched jevity to nepro. gabriel placed for high urine output. Started SL atropine for oral secretions. Dc'd free water.  10/25: BD25, o/n decreased suctioning requirements to > q4hrs, GEORGE. Cr improving, cont phoslo, lokelma held at this time. Gabriel placed yest, cont. Tolerating trach collar. Given 500cc plasmalyte bolus for ANIKA. Dc'd sinemet.   10/26: BD26, o/n resumed lokelma 5mg daily and resumed 100cc free water q6hrs. Change in neuro status with new right pupillary dilation with anisocoria (right pupil 6mm fixed and left pupil 3mm briskly reactive). Given 23.4% NaCl bullet, taken for emergent CTH showing mostly resolved pontine hemorrhage, continued brainstem hypodensity likely edema d/t hemorrhage, no new hemorrhage or infarct, no herniation, mild increase in size of left lateral ventricle. Vitals remaine stable. Na goal > 140.   10/27: BD27, o/n GEORGE.Neuro stable. Pend stepdown with airway bed.   10/28: BD 28. GEORGE overnight. Neuro stable. Miralax ordered. Gabriel removed, pending TOV.  10/29: BD 29. GEORGE o/n. Given 2L NS over 8 hrs for increased BUN/Cr ratio. Gabriel placed for frequent straight cath.   10/30: BD 30.   10/31: BD 31. GEORGE overnight. Na 149, increased free water to 200q6. 1L NS for uptrending BUN.   11/1: BD 32. GEORGE overnight. Given 1L NS for dehydration. Na 146, increased FW to 250q6.   11/2: BD 33 GEORGE overnight, neuro stable, given 500cc bolus for net negative status and tachycardia   11/3: BD 34, GEORGE overnight, neuro stable. Patient remains tachycardic, EKG showing sinus tachycardia, given additional 500cc NS bolus. Febrile to 101.9F, pan cultured (without UA), CXR WNL, given tylenol.   11/4: BD 35, GEORGE overnight, neuro stable. Given 1L NS for tachycardia. sputum (+) for stenotropohomas maltophilia.   11/5: BD 36 GEORGE overnight, neuro stable. Vancomycin dc'd. Chest PT BID. ID consulted, cont zosyn.  11/6: BD 37. blood cx + klebsiella dc zosyn changed to cefepime, CTAP ordered, rpt blood cx sent.    11/7: BD 38. Pending CT A/P, given 250cc bolus and starting maintenance fluids overnight. Pending CT A/P after bolus   11/8: BD 39. CT CAP negative for infection.   11/9: BD 40. GEORGE overnight.  11/10: BD 41. GEORGE overnight. desat to 85 on trach collar, O2 inc to 10L and 100%, O2 sat inc to 95. pt tachy to 110s, euvolemic. given tylenol. ABG and CXR ordered. spiked fever, pancultured, RVP negative. AM ABG w pO2 79, rpt w pO2 79. pt appears comfortable, satting 94%.   11/11: BD 42. GEORGE overnight. pt became tachy to 130s, desat to 90 on 100% FiO2 and 10L. suctioned, (+) productive cough. temp 101.4, given 1g IV tylenol and 500cc NS bolus for euvolemia. fever and HR downtrending. LE dopplers negative for dvt  11/12: BD 43, GEORGE ovn, fever and HR downtrending, satting 97% 70% FIO2  11/13: BD 44, GEORGE ovn. started standing tylenol x24 hours for tachycardia. desat to 80s, o2 increased. CXR stable, pending CTA PE protocol.   11/14: BD 45, GEORGE overnight, neuro stable. resp therapy dec FiO2 to 70%.   11/15: BD 46, GEORGE overnight, neuro stable.  Rapid called for desaturation 30s, tachycardic 140s. Patient bagged, 100% fio2, heavily suctioned. CXR/POCUS unremarkable. ABG c/w desaturation. WBC 14.71. Afebrile. O2 improved to 90s and patient upgraded to ICU. ABG paO2 30s improved to 89 on vent. IV Tylenol x 1, sputum sent. Start protonix while o-n vent.   11/16: BD 47. POCUS showed collapsable IVCF, given 1L bolus. Vanco/Cefpime added empriic for PNA, NGT feeds restarted. MRSA swab neg, Vanc DC'd.   11/17: BD 48. GEORGE overnight. 1l bolus for tachycardia. Spiked to 101, cultured. 500cc bolus for tachycardia, tachy to 148 given 25mcg fentanyl, 250cc albumin, 1.5L bolus. 5 IV lopressor with response HR to 100s. +Stenotrophomonas on sputum cx.   11/18: BD 49. GEORGE overnight. Consulted ID, cefepime switched to bactrim x7days. Started hydrochlorothiazine 12.5mg daily.  11/19: BD 50. Tachy 120s, given tylenol and 500cc NS. Tolerating 5/5, switched to TCx. Holding phos binder. D/c Bactrim. D/c gabriel, f/u TOV. Dc'd PPI.   11/20: BD 51. GEORGE ovn. 1600 satting low 90s, mildly tachy to 110s, afebrile, RR wnl. O2 improved to mid 90s while inc O2 to 100% on TCx. CPAP 5/5 placed back on.  11/21: BD 52, GEORGE ovn, tolerating CPAP 5/5. Switch to trach collar during the day if tolerating well. HCTZ held for Cr bump, straight cath frequence increased to q4  11/22: BD 53, GEORGE ovn. Resumed phoslo. Gabriel placed. Resumed HCTZ.   11/23: BD 54. Holding tylenol in setting of possible fever, will require pan cx if febrile. Cr improved today. Cont CPAP. Bowel regimen held i/s/o diarrhea. FOBT negative.  11/24: BD 55. GEORGE overnight. Neuro stable. HCTZ dc'ed, started lisinopril 5. Lokelma dc'ed for K 3.7.   11/25: BD 56, GEORGE overnight. Neuro stable. dc'd lisinopril 5mg. Gabriel dc'd. TOV. 1545 noted to be hypotensive, MAP 50, in supine position on chair, HR 60s, afebrile, O2 96%. Given 1L cc NS bolus, placed back on bed in reverse trendelenberg, improved to map of 66. Neostick at bedside. Vitals check q1h. Dc'ed amlodipine. Failed TOV, bladder scan q6, sc prn. Added back Senna.   11/26: BD 57, GEORGE overnight. Neuro stable. Dc'd phoslo.   11/27: BD 58, GEORGE overnight. Neuro stable.    11/28: BD 59. Gabriel replaced.   11/29: GEORGE.  11/30: GEORGE, neuro stable.   12/1: BD 62, GEORGE overnight  12/2: BD 63, GEORGE overnight.; Given 1L bolus of LR for uptrending BUN/Cr.  12/3: BD 64. Reinstated eye gtt/moisture chamber given increased Rt eye injection  12/4: BD 65. GEORGE overnight. Attempted to speak with ophtho regarding eyelid weight/closure but no answer, full mailbox.   12/5: BD 66, GEORGE overnight, bowel regimen increased and had BM.   12/6: BD 67, GEORGE overnight, neuro stable.  12/7: GEORGE overnight, neuro stable. /110s, given x1 hydralazine 10 mg IVP. Restarted home amlodipine 5mg.  12/8: GEORGE. OOB to chair.     12/11: GEORGE, mucomyst added for thick secretions, simethicone for abd distension, abd xray with stool burden, increased bowel regimen.   12/12: GEORGE overnight.   12/13: GEORGE overnight.   12/14: GEORGE overnight  12/15: o/n Patient became tachycardic HR 120s and 10 minutes later O2 sat dropped as low as 89%. Patient suctioned without improvement in O2 sat and tube feeds found in suction catheter. TFs held.  STAT CXR ordered. STAT labs sent. Respiratory therapist called to bedside and patient trach connected to ventilator. After connection to ventilator and further suctioning O2 sat improved to 97% but patient HR remains 120-130s. Upgraded to NSICU for further management. Vancomycin and zosyn started. CTA  chest PE protocol and CTH ordered. Blood cultures sent.given 500cc bolus, rpt ABG sent pO2 243, CTH and CTA chest done. FS while NPO, FiO2 dec 50 pending ABG. sputum cx positive for few GPC and GNR.   12/16: GEORGE overnight, restarted amlodipine, troponin 75   12/17: GEORGE overnight, neuro stable. Attempted CPAP this morning, did not tolerate and back on full vent support. Dc'd Vanc. Tachycardia to 140s, noted extremities to be twitching along with jaw twitching. Given 2g of Keppra, total 4mg ativan and placed on EEG, full set of labs and lactate negative. Resumed trickle feeds at 20cc/hr. Given 250cc albumin for tachycardia.   12/18: GEORGE overnight. Neuro stable. CTH stable. EEG negative and dc'd.   12/19: GEORGE overnight. neuro stable. SIMV most of day, AC/VC at night.   12/20: GEORGE overnight, neuro stable. remains on VC/AC  12/21: GEORGE ovn. 1L bolus for tachy to 120s-130s.   12/22: GEORGE ovn. Tachy to 120s, given tylenol and IVF. 2mg IV ativan given for L jaw twitching. Sx resolved for 3 minutes and started again. Epilepsy contacted. Given 2mg IV ativan. Continued twitching. Increased keppra to 1500mg BID, 2mg ativan given. Propranolol started for refractory tachycardia. vEEG ordered. albumin given. POCUS performed, no b lines. Started on fosphenytoin, loaded w 20mg/kg then 100mg TID. febrile, pancx, started cefepime 2g q8, vancomycin  12/23: GEORGE ovn. +focal motor seizures on eeg. ID consulted. Vancomycin dc'ed as per ID.  12/24: GEORGE ovn, neuro stable. Baclofen 5 mg q12 started for hiccups. Na 129 from 134. Urine lytes c/w SIADH. Given 250cc 3% bolus. CT chest for infection w/u - f/u read. Repeat Na 136. UA negative  12/25: GEORGE ovn.   12/26: GEORGE ovn  12/27: GEORGE overnight. Blood cx neg @ 4 days, DC cefepime per medicine (sputum colonized).   12/28: Desaturation o/n to 80's, CXR obtained, pulse ox changed and sats resolved. Na 133 from 138, 250cc 3% bolus given. Cefepime resumed until 12/30 per ID. Rpt Na 138.  12/29: GEORGE overnight. Na stable.   12/30: GEORGE overnight. Family meeting with son, pt now DNR.   12/31: GEORGE overnight.     Vital Signs Last 24 Hrs  T(C): 37 (30 Dec 2024 22:00), Max: 37 (30 Dec 2024 04:54)  T(F): 98.6 (30 Dec 2024 22:00), Max: 98.6 (30 Dec 2024 04:54)  HR: 64 (31 Dec 2024 00:16) (64 - 82)  BP: 112/74 (31 Dec 2024 00:16) (102/66 - 127/80)  BP(mean): 89 (31 Dec 2024 00:16) (78 - 100)  RR: 16 (31 Dec 2024 00:16) (15 - 19)  SpO2: 99% (31 Dec 2024 00:16) (99% - 100%)    Parameters below as of 31 Dec 2024 00:16  Patient On (Oxygen Delivery Method): ventilator    O2 Concentration (%): 40    I&O's Summary    29 Dec 2024 07:01  -  30 Dec 2024 07:00  --------------------------------------------------------  IN: 2809.8 mL / OUT: 1375 mL / NET: 1434.8 mL    30 Dec 2024 07:01  -  31 Dec 2024 00:18  --------------------------------------------------------  IN: 1150 mL / OUT: 750 mL / NET: 400 mL        PHYSICAL EXAM:  Constitutional: NAD, resting comfortably in bed.   HEENT: Pupils equal, round, reactive to light  Respiratory: +Trach to vent. No respiratory distress, symmetric chest rise   Cardiovascular: RRR, S1, S2.   Gastrointestinal: +PEG, Abdomen soft, non tender, nondistended.  Neurological:  AAOX0. Opens eyes to voice, tracks vertically.   Motor: RUE trace HG 1/5. LUE 0/5, BL LE 0 vs TF to noxious   Sensation: unable to assess d/t mental status  Extremities: Warm, well perfused.  Wounds: none      TUBES/LINES:  [x] Gabriel  [] Lumbar Drain  [] Wound Drains  [] Others  [x] trach   [x] peg     DIET:  [] NPO  [] Mechanical  [x] Tube feeds    LABS:                        10.0   9.78  )-----------( 243      ( 30 Dec 2024 05:30 )             30.9     12-30    136  |  105  |  49[H]  ----------------------------<  117[H]  4.1   |  21[L]  |  1.29    Ca    9.1      30 Dec 2024 05:30  Phos  4.2     12-30  Mg     2.1     12-30        Urinalysis Basic - ( 30 Dec 2024 05:30 )    Color: x / Appearance: x / SG: x / pH: x  Gluc: 117 mg/dL / Ketone: x  / Bili: x / Urobili: x   Blood: x / Protein: x / Nitrite: x   Leuk Esterase: x / RBC: x / WBC x   Sq Epi: x / Non Sq Epi: x / Bacteria: x          CAPILLARY BLOOD GLUCOSE          Drug Levels: [] N/A  Phenytoin Level, Serum: 12.2 ug/mL (12-24 @ 05:30)    CSF Analysis: [] N/A      Allergies    Allergy Status Unknown    Intolerances      MEDICATIONS:  Antibiotics:    Neuro:  acetaminophen   Oral Liquid .. 650 milliGRAM(s) Oral every 6 hours PRN  baclofen 5 milliGRAM(s) Oral every 12 hours  fosphenytoin IVPB 100 milliGRAM(s) PE IV Intermittent three times a day  levETIRAcetam 1500 milliGRAM(s) Oral two times a day    Anticoagulation:  heparin   Injectable 5000 Unit(s) SubCutaneous every 8 hours    OTHER:  acetylcysteine 10%  Inhalation 4 milliLiter(s) Inhalation every 6 hours  albuterol/ipratropium for Nebulization 3 milliLiter(s) Nebulizer every 6 hours  amLODIPine   Tablet 5 milliGRAM(s) Oral daily  artificial tears (preservative free) Ophthalmic Solution 1 Drop(s) Right EYE every 4 hours  chlorhexidine 0.12% Liquid 15 milliLiter(s) Oral Mucosa every 12 hours  chlorhexidine 2% Cloths 1 Application(s) Topical <User Schedule>  doxazosin 8 milliGRAM(s) Oral at bedtime  erythromycin   Ointment 1 Application(s) Right EYE at bedtime  fluticasone propionate 50 MICROgram(s)/spray Nasal Spray 1 Spray(s) Both Nostrils two times a day  ofloxacin 0.3% Solution 1 Drop(s) Right EYE four times a day  pantoprazole  Injectable 40 milliGRAM(s) IV Push daily  petrolatum Ophthalmic Ointment 1 Application(s) Both EYES two times a day  propranolol 10 milliGRAM(s) Oral every 8 hours  sodium chloride 0.65% Nasal 1 Spray(s) Both Nostrils two times a day    IVF:    CULTURES:  Culture Results:   Numerous Klebsiella aerogenes (Previously Enterobacter)  Numerous Stenotrophomonas maltophilia  Commensal iram consistent with body site (12-25 @ 20:05)  Culture Results:   No growth at 5 days (12-22 @ 17:45)    RADIOLOGY & ADDITIONAL TESTS:      ASSESSMENT:  45 y/o PMH ?stroke/MI present to [facility] after collapsing at work. Decorticate posturing, vomiting, intubated for airway protection. 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 Transferred to [facility] for further management. s/p trach 10/14. s/p peg 10/21. Re-upgrade to ICU 2/2 desaturation event and suctioning requirements 11/15. Re-upgrade to NSICU 12/15 2/2 desaturation and tachycardia.      AMS    Intubate    Handoff    MEWS Score    Acute myocardial infarction    CVA (cerebral vascular accident)    Intracerebral hemorrhage of brain stem    Brainstem stroke    Brain stem stroke syndrome    Brain stem hemorrhage    Brain stem stroke syndrome    Hemorrhagic stroke    Brainstem stroke    Encephalopathy acute    Functional quadriplegia    Advanced care planning/counseling discussion    Encounter for palliative care    Pontine hemorrhage    Neurogenic dysphagia    Chronic respiratory failure    Acute kidney injury superimposed on CKD    Acute urinary retention    Hypertensive emergency    Sepsis, unspecified organism    Sepsis    Gram-negative bacteremia    Dyspnea    Percutaneous tracheal puncture    Altered mental status examination    EGD, with PEG    AMS    Room Service Assist    [email or web address]        PLAN:    Neuro:  - neuro/vitals q4h  - pain control: tylenol prn  - seizure tx: keppra 1500mg BID, fosphenytoin 100mg TID  - vEEG (10/3-4) negative, (10/17-10/19) negative, (12/17-12/18) negative, (12/22-12/25 ) +focal motor seizures not correlating w/ EEG  - epilepsy following  - CTH 9/30: enlarged pontine hemorrhage, CTH 10/3: stable, CTH 10/25: mostly resolved pontine hemorrhage, CTH 12/15: R mastoid air cell opacification; acute otitis media vs sterile effusion, CTH 12/18: stable.  - MRI brain 10/12: parenchymal hemorrhage, acute/subacute R cerebellar stroke    - stroke neuro signed off    CV:  - -160  - tachycardia: propranolol 10mg q8  - HTN: amlodipine 5mg  - echo (9/30) EF 75%, repeat 12/16: 57%    Resp:  - trach to vent, AC/VC 40/400/14/6  - CTA PE protocol 12/15 negative   - Secretions: duonebs/mucomyst/chest PT q6h   - CT Chest 12/24 for infectious w/u: b/l LL atelectasis and opacities     GI:  - TF via PEG (placed 10/21 by gen surg)  - bowel regimen held for loose stool, last BM 12/30  - baclofen 5q12 for hiccups     Renal:  - IVL  - Urinary retenion: Gabriel replaced 12/15  - CKD: trend BUN/Cr  - renal US 10/1: echogenicity c/w chronic med renal dz, repeat 10/8: inc renal echogeicity, c/f medical renal disease w increased hydro     Endo:  - A1c 5.4    Heme:  - DVT ppx: SCDs, SQH 5000u q8h   - LE dopplers negative 12/16    ID:  - last pan cx 12/22  - empiric PNA: cefepime (12/22-12/30), s/p vanc (12/22-12/23), s/p zosyn (12/15-12/20), s/p vanc (12/15-12/16)  - s/p Stenotrophomonas maltophilia PNA: s/p Bactrim (11/18-11/19) s/p Cefepime (11/16-11/18)  - 11/3, (+) sputum for stenotrophomas maltophlia, blood cx (+) klebsiella, cefepime 2gq12 (11/6 - 11/12)   - empiric tx: s/p zosyn (11/3-11/6) , s/p vanc  (11/3-11/5)  - S/p Ancef (10/4-10/14) for PNA, and s/p Unasyn (10/18-10/23) +actinobacter baumanii     MISC:  - ophtho consult for keratitis  - erythromycin ointment R eye q4hrs, ofloxacin ointment R eye QID, artificial tears R eye q2hrs, moisture chamber at bedtime    Dispo: SDU status, DNR, pending placement     D/w Dr. D'Amico

## 2025-01-01 LAB
ANION GAP SERPL CALC-SCNC: 10 MMOL/L — SIGNIFICANT CHANGE UP (ref 5–17)
ANION GAP SERPL CALC-SCNC: 10 MMOL/L — SIGNIFICANT CHANGE UP (ref 5–17)
BUN SERPL-MCNC: 46 MG/DL — HIGH (ref 7–23)
BUN SERPL-MCNC: 46 MG/DL — HIGH (ref 7–23)
CALCIUM SERPL-MCNC: 9.2 MG/DL — SIGNIFICANT CHANGE UP (ref 8.4–10.5)
CALCIUM SERPL-MCNC: 9.4 MG/DL — SIGNIFICANT CHANGE UP (ref 8.4–10.5)
CHLORIDE SERPL-SCNC: 100 MMOL/L — SIGNIFICANT CHANGE UP (ref 96–108)
CHLORIDE SERPL-SCNC: 102 MMOL/L — SIGNIFICANT CHANGE UP (ref 96–108)
CO2 SERPL-SCNC: 22 MMOL/L — SIGNIFICANT CHANGE UP (ref 22–31)
CO2 SERPL-SCNC: 22 MMOL/L — SIGNIFICANT CHANGE UP (ref 22–31)
CREAT SERPL-MCNC: 1.25 MG/DL — SIGNIFICANT CHANGE UP (ref 0.5–1.3)
CREAT SERPL-MCNC: 1.28 MG/DL — SIGNIFICANT CHANGE UP (ref 0.5–1.3)
EGFR: 70 ML/MIN/1.73M2 — SIGNIFICANT CHANGE UP
EGFR: 70 ML/MIN/1.73M2 — SIGNIFICANT CHANGE UP
EGFR: 72 ML/MIN/1.73M2 — SIGNIFICANT CHANGE UP
EGFR: 72 ML/MIN/1.73M2 — SIGNIFICANT CHANGE UP
GLUCOSE SERPL-MCNC: 117 MG/DL — HIGH (ref 70–99)
GLUCOSE SERPL-MCNC: 120 MG/DL — HIGH (ref 70–99)
HCT VFR BLD CALC: 32 % — LOW (ref 39–50)
HGB BLD-MCNC: 10.4 G/DL — LOW (ref 13–17)
MAGNESIUM SERPL-MCNC: 2.1 MG/DL — SIGNIFICANT CHANGE UP (ref 1.6–2.6)
MCHC RBC-ENTMCNC: 30 PG — SIGNIFICANT CHANGE UP (ref 27–34)
MCHC RBC-ENTMCNC: 32.5 G/DL — SIGNIFICANT CHANGE UP (ref 32–36)
MCV RBC AUTO: 92.2 FL — SIGNIFICANT CHANGE UP (ref 80–100)
NRBC # BLD: 0 /100 WBCS — SIGNIFICANT CHANGE UP (ref 0–0)
NRBC BLD-RTO: 0 /100 WBCS — SIGNIFICANT CHANGE UP (ref 0–0)
OSMOLALITY UR: 387 MOSM/KG — SIGNIFICANT CHANGE UP (ref 300–900)
PHOSPHATE SERPL-MCNC: 3.3 MG/DL — SIGNIFICANT CHANGE UP (ref 2.5–4.5)
PLATELET # BLD AUTO: 275 K/UL — SIGNIFICANT CHANGE UP (ref 150–400)
POTASSIUM SERPL-MCNC: 3.4 MMOL/L — LOW (ref 3.5–5.3)
POTASSIUM SERPL-MCNC: 4.4 MMOL/L — SIGNIFICANT CHANGE UP (ref 3.5–5.3)
POTASSIUM SERPL-SCNC: 3.4 MMOL/L — LOW (ref 3.5–5.3)
POTASSIUM SERPL-SCNC: 4.4 MMOL/L — SIGNIFICANT CHANGE UP (ref 3.5–5.3)
RBC # BLD: 3.47 M/UL — LOW (ref 4.2–5.8)
RBC # FLD: 14.1 % — SIGNIFICANT CHANGE UP (ref 10.3–14.5)
SODIUM SERPL-SCNC: 132 MMOL/L — LOW (ref 135–145)
SODIUM SERPL-SCNC: 134 MMOL/L — LOW (ref 135–145)
SODIUM UR-SCNC: 48 MMOL/L — SIGNIFICANT CHANGE UP
WBC # BLD: 8.25 K/UL — SIGNIFICANT CHANGE UP (ref 3.8–10.5)
WBC # FLD AUTO: 8.25 K/UL — SIGNIFICANT CHANGE UP (ref 3.8–10.5)

## 2025-01-01 PROCEDURE — 99233 SBSQ HOSP IP/OBS HIGH 50: CPT

## 2025-01-01 PROCEDURE — 99231 SBSQ HOSP IP/OBS SF/LOW 25: CPT

## 2025-01-01 RX ADMIN — IPRATROPIUM BROMIDE AND ALBUTEROL SULFATE 3 MILLILITER(S): .5; 2.5 SOLUTION RESPIRATORY (INHALATION) at 05:11

## 2025-01-01 RX ADMIN — Medication 1 DROP(S): at 17:36

## 2025-01-01 RX ADMIN — ACETYLCYSTEINE 4 MILLILITER(S): 200 INHALANT RESPIRATORY (INHALATION) at 23:05

## 2025-01-01 RX ADMIN — DOXAZOSIN MESYLATE 8 MILLIGRAM(S): 8 TABLET ORAL at 21:33

## 2025-01-01 RX ADMIN — Medication 20 MILLIEQUIVALENT(S): at 10:41

## 2025-01-01 RX ADMIN — Medication 20 MILLIEQUIVALENT(S): at 07:54

## 2025-01-01 RX ADMIN — Medication 1 APPLICATION(S): at 05:25

## 2025-01-01 RX ADMIN — OFLOXACIN 1 DROP(S): 3 SOLUTION OPHTHALMIC at 23:27

## 2025-01-01 RX ADMIN — FLUTICASONE PROPIONATE 1 SPRAY(S): 50 SPRAY, METERED NASAL at 17:37

## 2025-01-01 RX ADMIN — ACETYLCYSTEINE 4 MILLILITER(S): 200 INHALANT RESPIRATORY (INHALATION) at 12:43

## 2025-01-01 RX ADMIN — FLUTICASONE PROPIONATE 1 SPRAY(S): 50 SPRAY, METERED NASAL at 05:25

## 2025-01-01 RX ADMIN — LEVETIRACETAM 1500 MILLIGRAM(S): 10 INJECTION, SOLUTION INTRAVENOUS at 17:35

## 2025-01-01 RX ADMIN — FOSPHENYTOIN SODIUM 104 MILLIGRAM(S) PE: 50 INJECTION INTRAMUSCULAR; INTRAVENOUS at 05:12

## 2025-01-01 RX ADMIN — ACETYLCYSTEINE 4 MILLILITER(S): 200 INHALANT RESPIRATORY (INHALATION) at 17:37

## 2025-01-01 RX ADMIN — OFLOXACIN 1 DROP(S): 3 SOLUTION OPHTHALMIC at 12:44

## 2025-01-01 RX ADMIN — HEPARIN SODIUM 5000 UNIT(S): 1000 INJECTION INTRAVENOUS; SUBCUTANEOUS at 21:33

## 2025-01-01 RX ADMIN — OFLOXACIN 1 DROP(S): 3 SOLUTION OPHTHALMIC at 17:36

## 2025-01-01 RX ADMIN — Medication 1 SPRAY(S): at 17:36

## 2025-01-01 RX ADMIN — Medication 1 SPRAY(S): at 05:25

## 2025-01-01 RX ADMIN — IPRATROPIUM BROMIDE AND ALBUTEROL SULFATE 3 MILLILITER(S): .5; 2.5 SOLUTION RESPIRATORY (INHALATION) at 17:37

## 2025-01-01 RX ADMIN — Medication 1 DROP(S): at 05:13

## 2025-01-01 RX ADMIN — IPRATROPIUM BROMIDE AND ALBUTEROL SULFATE 3 MILLILITER(S): .5; 2.5 SOLUTION RESPIRATORY (INHALATION) at 12:43

## 2025-01-01 RX ADMIN — AMLODIPINE BESYLATE 5 MILLIGRAM(S): 10 TABLET ORAL at 05:12

## 2025-01-01 RX ADMIN — Medication 40 MILLIGRAM(S): at 12:43

## 2025-01-01 RX ADMIN — Medication 15 MILLILITER(S): at 17:35

## 2025-01-01 RX ADMIN — IPRATROPIUM BROMIDE AND ALBUTEROL SULFATE 3 MILLILITER(S): .5; 2.5 SOLUTION RESPIRATORY (INHALATION) at 23:06

## 2025-01-01 RX ADMIN — Medication 15 MILLILITER(S): at 05:12

## 2025-01-01 RX ADMIN — ACETYLCYSTEINE 4 MILLILITER(S): 200 INHALANT RESPIRATORY (INHALATION) at 05:12

## 2025-01-01 RX ADMIN — FOSPHENYTOIN SODIUM 104 MILLIGRAM(S) PE: 50 INJECTION INTRAMUSCULAR; INTRAVENOUS at 21:34

## 2025-01-01 RX ADMIN — Medication 1 DROP(S): at 15:00

## 2025-01-01 RX ADMIN — ERYTHROMYCIN 1 APPLICATION(S): 5 OINTMENT OPHTHALMIC at 21:39

## 2025-01-01 RX ADMIN — Medication 20 MILLIEQUIVALENT(S): at 12:44

## 2025-01-01 RX ADMIN — BACLOFEN 5 MILLIGRAM(S): 10 INJECTION INTRATHECAL at 17:37

## 2025-01-01 RX ADMIN — OFLOXACIN 1 DROP(S): 3 SOLUTION OPHTHALMIC at 05:26

## 2025-01-01 RX ADMIN — LEVETIRACETAM 1500 MILLIGRAM(S): 10 INJECTION, SOLUTION INTRAVENOUS at 05:12

## 2025-01-01 RX ADMIN — Medication 1 DROP(S): at 02:11

## 2025-01-01 RX ADMIN — FOSPHENYTOIN SODIUM 104 MILLIGRAM(S) PE: 50 INJECTION INTRAMUSCULAR; INTRAVENOUS at 14:56

## 2025-01-01 RX ADMIN — Medication 1 APPLICATION(S): at 17:36

## 2025-01-01 RX ADMIN — HEPARIN SODIUM 5000 UNIT(S): 1000 INJECTION INTRAVENOUS; SUBCUTANEOUS at 14:56

## 2025-01-01 RX ADMIN — BACLOFEN 5 MILLIGRAM(S): 10 INJECTION INTRATHECAL at 05:12

## 2025-01-01 RX ADMIN — HEPARIN SODIUM 5000 UNIT(S): 1000 INJECTION INTRAVENOUS; SUBCUTANEOUS at 05:12

## 2025-01-01 RX ADMIN — Medication 1 DROP(S): at 21:39

## 2025-01-01 RX ADMIN — Medication 1 DROP(S): at 10:41

## 2025-01-01 NOTE — PROGRESS NOTE ADULT - SUBJECTIVE AND OBJECTIVE BOX
Patient was seen and examined at bedside. Case discuss with the primary team.     OBJECTIVE:  Vital Signs Last 24 Hrs  T(C): 36.5 (01 Jan 2025 09:06), Max: 36.8 (31 Dec 2024 13:00)  T(F): 97.7 (01 Jan 2025 09:06), Max: 98.3 (01 Jan 2025 06:23)  HR: 70 (01 Jan 2025 08:39) (62 - 76)  BP: 152/99 (01 Jan 2025 08:39) (115/71 - 152/99)  BP(mean): 119 (01 Jan 2025 08:39) (89 - 119)  RR: 18 (01 Jan 2025 08:39) (15 - 20)  SpO2: 99% (01 Jan 2025 08:39) (98% - 100%)    Parameters below as of 01 Jan 2025 08:39  Patient On (Oxygen Delivery Method): ventilator    O2 Concentration (%): 40      PHYSICAL EXAM:  General: comfortable-appearing, no accessory muscle use on trach collar  HEENT: tracheostomy   Lungs: no crackles, no wheezes  Heart: regular  Abdomen: soft, no visible tenderness, + BS, +PEG + gabriel in place   Extremities: warm, no edema, not moving to command      LABS:                        10.4   8.25  )-----------( 275      ( 01 Jan 2025 06:37 )             32.0     01-01    132[L]  |  100  |  46[H]  ----------------------------<  117[H]  3.4[L]   |  22  |  1.28    Ca    9.2      01 Jan 2025 06:37  Phos  3.3     01-01  Mg     2.1     01-01      acetaminophen   Oral Liquid .. 650 milliGRAM(s) Oral every 6 hours PRN  acetylcysteine 10%  Inhalation 4 milliLiter(s) Inhalation every 6 hours  albuterol/ipratropium for Nebulization 3 milliLiter(s) Nebulizer every 6 hours  amLODIPine   Tablet 5 milliGRAM(s) Oral daily  artificial tears (preservative free) Ophthalmic Solution 1 Drop(s) Right EYE every 4 hours  baclofen 5 milliGRAM(s) Oral every 12 hours  chlorhexidine 0.12% Liquid 15 milliLiter(s) Oral Mucosa every 12 hours  chlorhexidine 2% Cloths 1 Application(s) Topical <User Schedule>  doxazosin 8 milliGRAM(s) Oral at bedtime  erythromycin   Ointment 1 Application(s) Right EYE at bedtime  fluticasone propionate 50 MICROgram(s)/spray Nasal Spray 1 Spray(s) Both Nostrils two times a day  fosphenytoin IVPB 100 milliGRAM(s) PE IV Intermittent three times a day  heparin   Injectable 5000 Unit(s) SubCutaneous every 8 hours  levETIRAcetam 1500 milliGRAM(s) Oral two times a day  ofloxacin 0.3% Solution 1 Drop(s) Right EYE four times a day  pantoprazole  Injectable 40 milliGRAM(s) IV Push daily  petrolatum Ophthalmic Ointment 1 Application(s) Both EYES two times a day  potassium chloride   Powder 20 milliEquivalent(s) Oral every 2 hours  propranolol 10 milliGRAM(s) Oral every 8 hours  sodium chloride 0.65% Nasal 1 Spray(s) Both Nostrils two times a day

## 2025-01-01 NOTE — PROGRESS NOTE ADULT - ASSESSMENT
44M with unclear PMH (?stroke, MI) who was found down at work, intubated for airway protection and found to have acute parenchymal hemorrhage within nabil with mass effect (+ acute/subacute right cerebellar infarct) in setting of hypertensive emergency, transferred to [facility] for further neurosurgical care. Hospital course c/b poor neurologic recovery s/p trach-PEG, AUR s/p gabriel, ANIKA on CKD c/b hyperkalemia, HAP s/p amp-sulbactam (EOT 10/23), K aerogenes bacteremia  treated with 2 weeks of Cefepime per ID , On 11/15 he became septic and was transferred to NSICU due to increased o2 requirements and needed Vent support , Trach Cultures + for Stenotrophomonas which was treated with 7 days of Bactrim per ID , has been weaned of Vent since 11/23 and is now on 10lts at 40% o2 through Trach Collar. Stepped up to the NSICU on 12/15 for hypoxia and tachycardia. PE ruled out. On abx for 5 days. Unclear etiology. Now stepped down to 8Lach.    # Pontine hemorrhage  # Encephalopathy due to Intracranial Bleed   - Acute parenchymal hemorrhage within nabil with mass effect (+ acute/subacute right cerebellar infarct) in setting of hypertensive emergency.   - MRI brain also demonstrated acute/subacute R cerebellar stroke.   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  -Will continue to monitor Na    # Seizures  - Continue Seizure precautions   - Continue  Keppra and Fosphenytoin     # Suspected Hospital Acquired Pneumonia   -Pt is afebrile and no leukocytosis   - Pt had a CT Chest on 12/24 with evidence of Bilateral lower lobe infiltrates   - Pt completed the abx tx course of  Cefepime on12/30.   Monitor blood cx and trach cultures     # Hypertension  -Pt with elevated BP this morning with SBP in the 150's  - Continue amlodipine 5mg daily   -Will continue to monitor BP and uptitrate antihypertensive if required     # Acute kidney injury  # Acute Tubular Necrosis superimposed on CKD stage III  - Pt s/p US renal with chronic medical renal disease  - Elevated BUN , Not on any meds that can elevate BUN , Hgb Stable   - Cr 1.28 within pt baseline range, ctm     # Chronic respiratory failure.   -Pt s/p percutaneous trach by pulm on 10/14/24  - Currently on Vent Support , No Increase in O2 requirement   - Continue Chest PT    # Neurogenic dysphagia.   - Pt s/p PEG with surgery 10/21/24  - TF per nutrition  - aspiration precautions and elevation of HOB.    #Acute urinary retention.   - doxazosin 8mg    # Functional quadriplegia in  setting of brainstem hemorrhage  - decub precautions  - care per nursing protocol     #Hypokalemia  -Pt with K of 3.4 this morning s/p repletion   -Will continue to monitor K and replete prn     # DVT prop  - Heparin SQ q8     # Dispo:   Pending Prucol , Court Appointed Guardian Kenna , Jason Aguayo   - Patient is currently DNR but will proceed without de-escalation at this time    55 minutes spent on total encounter. The necessity of the time spent during the encounter on this date of service was due to:    Review of hospital course, labs, vitals, medical records.  Bedside exam   Discussed plan of care with primary team   Documenting the encounter.

## 2025-01-01 NOTE — PROGRESS NOTE ADULT - SUBJECTIVE AND OBJECTIVE BOX
HPI:  Unknown age male, unknown past medical history (reported stroke and MI by coworkers) presented to [facility] with AMS, Pt was working at [employer] when he was found down by coworkers. EMS called and pt brought to [facility] ED. Intubated, sedated, started on cardene for SBPs in 200s. CT head showed brain stem bleed. Transferred to NSICU for further management.  (30 Sep 2024 12:55)    OVERNIGHT EVENTS: GEORGE overnight, neuro stable.       Hospital Course:   : transferred from [facility]. A line placed. Versed dc'd. Cy Rader at bedside, states pt has family in [place], cannot confirm medications or PMH other than stroke and MI. 250cc bolus 3% given. LR switched to NS. hydralazine 25q8 started, 3% started, switched propofol to precedex   10/1: stability CTH done. Added labetalol, started TF. Palliative consulted. ethics consulted to determine surrogate. febrile 103, pan cx sent  10/2: BD 2, GEORGE overnight. TF resumed. Desatt'd to 80s, FiO2 inc. to 50. Fentanyl given, ABG, CXR ordered. Maxxed on precedex, started on propofol for DARIEN -4 - -5. Precedex dc'd. Duonebs, mucomyst, hypertonic added. 3% dc'd. Cardene dc'd. Start vanc/CTX. Increased labetalol 200q8. MRSA negative, dc'd vanc. ETT pulled back 2cm x 2, good positioning after confirmatory chest xray. Ethics attempting to establish HCP with family. Na 159, starting FW 250q6 for range 150-155.   10/3: BD3, GEORGE o/n, neuro stable. Na elevating, FW increased to 300q6. Dc'd bowel reg for diarrhea. vEEG started. SQH 5000q8 tonight.   10/4: BD 4, albumn bolus, incr. LR to 80 2/2 incr. in Cr, LR to 10 0cc/hr for uptrending Cr. Started 7% hypersal for 48hrs and SL atropine for inline/oral thick secretions. Dc'd CTX and started ancef for MSSA in the sputum. Nephrology consulted for CKD, f/u recs. SBP 170s, given hydralazine 10mg IVP.   10/5: BD5, o/n 10mg IVP hydralazine given for SBP 170s and started on hydralazine 25q8 via OGT. 10mg IV push labetalol for SBP > 160s. RT placed for diarrhea.   10/6: BD6, o/n FW increased to 350q4 per nephrology recs. IV tylenol for temp 100.6, SBp 160s presumed uncomfortable.   10/7: BD7, overnight pancultured for temp 101.8F.   10/8: BD8. GEORGE. Cr bumped. decreased LR to 75cc/hr. Adding simethicone ATC. incr hydralazine 50mgTID. Incr labetalol 300mgTID. Na 145, decreased FWF to 250q6. Start precedex. FENa consistent with intrinsic kidney injury. Pend repeat renal US. Retaining up to 1.3L, bladder scans q6, straight cath PRN  10/9: BD 9. GEORGE overnight. Neuro stable. abd xray for distention w non-specific gas pattern, OGT to LIWS for morning. duonebs/mucomyst to q8 for improving secretions. Changed tube feeds to Jevity 1.5 20cc/hr, low rate due to abdominal distention, nepro dense and more difficult to digest. Tolerating CPAP, confirmed by ABG.   10/10: BD 10. GEORGE overnight. Neuro stable. (+) gabriel for urinary retention on bladder scan. inc TF to goal rate of 40cc/hr. family leaning toward pursuing trach/PEG. 1/2 amp for FS 81.   10/11: BD 11. GEORGE overnight. Neuro stable. Trach/PEG consults placed.   10/12: BD 12. GEORGE overnight. Neuro stable. MRI brain complete.   10/13: BD 13. Increase flomax. Hold SQH after PM dose for trach tm. IVL.   10/14: BD 14. GEORGE overnight, remains on AC/VC. Gabriel placed for urinary retention. Dc'd free water.  S/p trach with pulm. NGT placed and CXR confirmed in good position.   10/15: BD 15, GEORGE ovn. resumed feeds. spiked 101, pan cx sent.   10/16: BD 16. GEORGE ovn. Lokelma 5mg for K+ 5.4. Started vanc q 24/zosyn for empiric PNA coverage, IVF to 100/hr. PEG held for fever.   10/17: BD 17,  ordered serum osm and urine osm for am. Started sinemet for neurostimulation. Increased cardura to 0.8. Started FW 100q4, dc'd IVF. MRSA negative, dc'd vanc. NGT replaced d/t coiling.   10/18: BD 18, GEORGE overnight, neuro stable. Amantadine added for neurostim. zosyn changed to unasyn for acinetobacter baumannii, failed TOV and required SC  10/19: BD 19, GEORGE ovn. cardura 2mg added for retention. labetalol decreased 200q8, hydralazine decreased 25q8. Gabriel replaced.   10/20: BD20, GEORGE overnight. NGT dislodged, replaced. PEG tomorrow w/ gen surg, FW increased to 150q4 and labetalol decreased to 100q8, lokelma given for hyperkalemia.   10/21: BD 21. POD0 PEG placement with Gen surg. decr labetolol to 50q8, incr. cardura to 0.4, started lokelma and phoslo, dc gabriel POD0 PEG placement with Gen surg.  10/22: BD 22. Plan to start TF today via PEG. dc labetalol, Following ophtho recs. Increased apnea settings - found to be in cheyne-moe respiration. CPAP 5/5.  10/23: BD 23. hydralazine d/c'd, trach collar trial today. Rectal tube placed at 6am.  10/24: BD24, o/n lokelma held due to diarrhea. Free water 100q6 resumed. dc'd tamsulosin, amantadine. Incr'd cardura to 8mg qhs. Dc'd FW. Switched jevity to nepro. gabriel placed for high urine output. Started SL atropine for oral secretions. Dc'd free water.  10/25: BD25, o/n decreased suctioning requirements to > q4hrs, GEORGE. Cr improving, cont phoslo, lokelma held at this time. Gabriel placed yest, cont. Tolerating trach collar. Given 500cc plasmalyte bolus for ANIKA. Dc'd sinemet.   10/26: BD26, o/n resumed lokelma 5mg daily and resumed 100cc free water q6hrs. Change in neuro status with new right pupillary dilation with anisocoria (right pupil 6mm fixed and left pupil 3mm briskly reactive). Given 23.4% NaCl bullet, taken for emergent CTH showing mostly resolved pontine hemorrhage, continued brainstem hypodensity likely edema d/t hemorrhage, no new hemorrhage or infarct, no herniation, mild increase in size of left lateral ventricle. Vitals remaine stable. Na goal > 140.   10/27: BD27, o/n GEORGE.Neuro stable. Pend stepdown with airway bed.   10/28: BD 28. GEORGE overnight. Neuro stable. Miralax ordered. Gabriel removed, pending TOV.  10/29: BD 29. GEORGE o/n. Given 2L NS over 8 hrs for increased BUN/Cr ratio. Gabriel placed for frequent straight cath.   10/30: BD 30.   10/31: BD 31. GEORGE overnight. Na 149, increased free water to 200q6. 1L NS for uptrending BUN.   : BD 32. GEORGE overnight. Given 1L NS for dehydration. Na 146, increased FW to 250q6.   : BD 33 GEORGE overnight, neuro stable, given 500cc bolus for net negative status and tachycardia   11/3: BD 34, GEORGE overnight, neuro stable. Patient remains tachycardic, EKG showing sinus tachycardia, given additional 500cc NS bolus. Febrile to 101.9F, pan cultured (without UA), CXR WNL, given tylenol.   : BD 35, GEORGE overnight, neuro stable. Given 1L NS for tachycardia. sputum (+) for stenotropohomas maltophilia.   : BD 36 GEORGE overnight, neuro stable. Vancomycin dc'd. Chest PT BID. ID consulted, cont zosyn.  : BD 37. blood cx + klebsiella dc zosyn changed to cefepime, CTAP ordered, rpt blood cx sent.    : BD 38. Pending CT A/P, given 250cc bolus and starting maintenance fluids overnight. Pending CT A/P after bolus   : BD 39. CT CAP negative for infection.   : BD 40. GEORGE overnight.  11/10: BD 41. GEORGE overnight. desat to 85 on trach collar, O2 inc to 10L and 100%, O2 sat inc to 95. pt tachy to 110s, euvolemic. given tylenol. ABG and CXR ordered. spiked fever, pancultured, RVP negative. AM ABG w pO2 79, rpt w pO2 79. pt appears comfortable, satting 94%.   : BD 42. GEORGE overnight. pt became tachy to 130s, desat to 90 on 100% FiO2 and 10L. suctioned, (+) productive cough. temp 101.4, given 1g IV tylenol and 500cc NS bolus for euvolemia. fever and HR downtrending. LE dopplers negative for dvt  : BD 43, GEORGE ovn, fever and HR downtrending, satting 97% 70% FIO2  : BD 44, GEORGE ovn. started standing tylenol x24 hours for tachycardia. desat to 80s, o2 increased. CXR stable, pending CTA PE protocol.   : BD 45, GEORGE overnight, neuro stable. resp therapy dec FiO2 to 70%.   11/15: BD 46, GEORGE overnight, neuro stable.  Rapid called for desaturation 30s, tachycardic 140s. Patient bagged, 100% fio2, heavily suctioned. CXR/POCUS unremarkable. ABG c/w desaturation. WBC 14.71. Afebrile. O2 improved to 90s and patient upgraded to ICU. ABG paO2 30s improved to 89 on vent. IV Tylenol x 1, sputum sent. Start protonix while o-n vent.   : BD 47. POCUS showed collapsable IVCF, given 1L bolus. Vanco/Cefpime added empriic for PNA, NGT feeds restarted. MRSA swab neg, Vanc DC'd.   : BD 48. GEORGE overnight. 1l bolus for tachycardia. Spiked to 101, cultured. 500cc bolus for tachycardia, tachy to 148 given 25mcg fentanyl, 250cc albumin, 1.5L bolus. 5 IV lopressor with response HR to 100s. +Stenotrophomonas on sputum cx.   : BD 49. GEORGE overnight. Consulted ID, cefepime switched to bactrim x7days. Started hydrochlorothiazine 12.5mg daily.  : BD 50. Tachy 120s, given tylenol and 500cc NS. Tolerating 5/5, switched to TCx. Holding phos binder. D/c Bactrim. D/c gabriel, f/u TOV. Dc'd PPI.   : BD 51. GEORGE ovn. 1600 satting low 90s, mildly tachy to 110s, afebrile, RR wnl. O2 improved to mid 90s while inc O2 to 100% on TCx. CPAP 5/5 placed back on.  : BD 52, GEORGE ovn, tolerating CPAP 5/5. Switch to trach collar during the day if tolerating well. HCTZ held for Cr bump, straight cath frequence increased to q4  : BD 53, GEORGE ovn. Resumed phoslo. Gabriel placed. Resumed HCTZ.   : BD 54. Holding tylenol in setting of possible fever, will require pan cx if febrile. Cr improved today. Cont CPAP. Bowel regimen held i/s/o diarrhea. FOBT negative.  : BD 55. GEORGE overnight. Neuro stable. HCTZ dc'ed, started lisinopril 5. Lokelma dc'ed for K 3.7.   : BD 56, GEORGE overnight. Neuro stable. dc'd lisinopril 5mg. Gabriel dc'd. TOV. 1545 noted to be hypotensive, MAP 50, in supine position on chair, HR 60s, afebrile, O2 96%. Given 1L cc NS bolus, placed back on bed in reverse trendelenberg, improved to map of 66. Neostick at bedside. Vitals check q1h. Dc'ed amlodipine. Failed TOV, bladder scan q6, sc prn. Added back Senna.   : BD 57, GEORGE overnight. Neuro stable. Dc'd phoslo.   : BD 58, GEORGE overnight. Neuro stable.    : BD 59. Gabriel replaced.   : GEORGE.  : GEORGE, neuro stable.   : BD 62, GEORGE overnight  : BD 63, GEORGE overnight.; Given 1L bolus of LR for uptrending BUN/Cr.  12/3: BD 64. Reinstated eye gtt/moisture chamber given increased Rt eye injection  : BD 65. GEORGE overnight. Attempted to speak with ophtho regarding eyelid weight/closure but no answer, full mailbox.   : BD 66, GEORGE overnight, bowel regimen increased and had BM.   : BD 67, GEORGE overnight, neuro stable.  : GEORGE overnight, neuro stable. /110s, given x1 hydralazine 10 mg IVP. Restarted home amlodipine 5mg.  : GEORGE. OOB to chair.     : GEORGE, mucomyst added for thick secretions, simethicone for abd distension, abd xray with stool burden, increased bowel regimen.   : GEORGE overnight.   : GEORGE overnight.   : GEORGE overnight  12/15: o/n Patient became tachycardic HR 120s and 10 minutes later O2 sat dropped as low as 89%. Patient suctioned without improvement in O2 sat and tube feeds found in suction catheter. TFs held.  STAT CXR ordered. STAT labs sent. Respiratory therapist called to bedside and patient trach connected to ventilator. After connection to ventilator and further suctioning O2 sat improved to 97% but patient HR remains 120-130s. Upgraded to NSICU for further management. Vancomycin and zosyn started. CTA  chest PE protocol and CTH ordered. Blood cultures sent.given 500cc bolus, rpt ABG sent pO2 243, CTH and CTA chest done. FS while NPO, FiO2 dec 50 pending ABG. sputum cx positive for few GPC and GNR.   : GEORGE overnight, restarted amlodipine, troponin 75   : GEORGE overnight, neuro stable. Attempted CPAP this morning, did not tolerate and back on full vent support. Dc'd Vanc. Tachycardia to 140s, noted extremities to be twitching along with jaw twitching. Given 2g of Keppra, total 4mg ativan and placed on EEG, full set of labs and lactate negative. Resumed trickle feeds at 20cc/hr. Given 250cc albumin for tachycardia.   : GEORGE overnight. Neuro stable. CTH stable. EEG negative and dc'd.   : GEORGE overnight. neuro stable. SIMV most of day, AC/VC at night.   : GEORGE overnight, neuro stable. remains on VC/AC  : GEORGE ovn. 1L bolus for tachy to 120s-130s.   : GEORGE ovn. Tachy to 120s, given tylenol and IVF. 2mg IV ativan given for L jaw twitching. Sx resolved for 3 minutes and started again. Epilepsy contacted. Given 2mg IV ativan. Continued twitching. Increased keppra to 1500mg BID, 2mg ativan given. Propranolol started for refractory tachycardia. vEEG ordered. albumin given. POCUS performed, no b lines. Started on fosphenytoin, loaded w 20mg/kg then 100mg TID. febrile, pancx, started cefepime 2g q8, vancomycin  : GEORGE ovn. +focal motor seizures on eeg. ID consulted. Vancomycin dc'ed as per ID.  : GEORGE ovn, neuro stable. Baclofen 5 mg q12 started for hiccups. Na 129 from 134. Urine lytes c/w SIADH. Given 250cc 3% bolus. CT chest for infection w/u - f/u read. Repeat Na 136. UA negative  : GEORGE ovn.   : GEORGE ovn  : GEORGE overnight. Blood cx neg @ 4 days, DC cefepime per medicine (sputum colonized).   : Desaturation o/n to 80's, CXR obtained, pulse ox changed and sats resolved. Na 133 from 138, 250cc 3% bolus given. Cefepime resumed until  per ID. Rpt Na 138.  : GEORGE overnight. Na stable.   : GEORGE overnight. Family meeting with son, pt now DNR.   : GEORGE overnight.   : GEORGE overnight, neuro stable.       Vital Signs Last 24 Hrs  T(C): 36.8 (31 Dec 2024 22:27), Max: 36.8 (31 Dec 2024 09:00)  T(F): 98.2 (31 Dec 2024 22:27), Max: 98.2 (31 Dec 2024 09:00)  HR: 74 (2025 00:57) (62 - 74)  BP: 123/81 (31 Dec 2024 20:08) (118/75 - 133/82)  BP(mean): 98 (31 Dec 2024 20:08) (92 - 103)  RR: 19 (2025 00:57) (14 - 19)  SpO2: 99% (2025 00:57) (99% - 100%)    Parameters below as of 2025 00:57  Patient On (Oxygen Delivery Method): ventilator    O2 Concentration (%): 40    I&O's Detail    30 Dec 2024 07:01  -  31 Dec 2024 07:00  --------------------------------------------------------  IN:    Free Water: 800 mL    Miscellaneous Tube Feedin mL  Total IN: 1940 mL    OUT:    Indwelling Catheter - Urethral (mL): 1550 mL  Total OUT: 1550 mL    Total NET: 390 mL      31 Dec 2024 07:  -  2025 01:23  --------------------------------------------------------  IN:    Enteral Tube Flush: 500 mL    Miscellaneous Tube Feedin mL  Total IN: 920 mL    OUT:    Indwelling Catheter - Urethral (mL): 700 mL  Total OUT: 700 mL    Total NET: 220 mL        I&O's Summary    30 Dec 2024 07:01  -  31 Dec 2024 07:00  --------------------------------------------------------  IN: 1940 mL / OUT: 1550 mL / NET: 390 mL    31 Dec 2024 07:  -  2025 01:23  --------------------------------------------------------  IN: 920 mL / OUT: 700 mL / NET: 220 mL        PHYSICAL EXAM:  Constitutional: NAD, resting comfortably in bed.   HEENT: Pupils equal, round, reactive to light  Respiratory: +Trach to vent. No respiratory distress, symmetric chest rise   Cardiovascular: RRR, S1, S2.   Gastrointestinal: +PEG, Abdomen soft, non tender, nondistended.  Neurological:  AAOX0. Opens eyes to voice, tracks vertically.   Motor: RUE trace HG 1/5. LUE 0/5, RLE TF vs.wd to noxious, LLE brisk wd to noxious  Sensation: unable to assess d/t mental status  Extremities: Warm, well perfused.  Wounds: none    TUBES/LINES:  [] CVC  [] A-line  [] Lumbar Drain  [] Ventriculostomy  [] Other    DIET:  [] NPO  [] Mechanical  [x] Tube feeds    LABS:    Urinalysis Basic - ( 31 Dec 2024 05:30 )    Color: x / Appearance: x / SG: x / pH: x  Gluc: 103 mg/dL / Ketone: x  / Bili: x / Urobili: x   Blood: x / Protein: x / Nitrite: x   Leuk Esterase: x / RBC: x / WBC x   Sq Epi: x / Non Sq Epi: x / Bacteria: x          CAPILLARY BLOOD GLUCOSE          Drug Levels: [] N/A    CSF Analysis: [] N/A      Allergies    Allergy Status Unknown    Intolerances      MEDICATIONS:  Antibiotics:    Neuro:  acetaminophen   Oral Liquid .. 650 milliGRAM(s) Oral every 6 hours PRN  baclofen 5 milliGRAM(s) Oral every 12 hours  fosphenytoin IVPB 100 milliGRAM(s) PE IV Intermittent three times a day  levETIRAcetam 1500 milliGRAM(s) Oral two times a day    Anticoagulation:  heparin   Injectable 5000 Unit(s) SubCutaneous every 8 hours    OTHER:  acetylcysteine 10%  Inhalation 4 milliLiter(s) Inhalation every 6 hours  albuterol/ipratropium for Nebulization 3 milliLiter(s) Nebulizer every 6 hours  amLODIPine   Tablet 5 milliGRAM(s) Oral daily  artificial tears (preservative free) Ophthalmic Solution 1 Drop(s) Right EYE every 4 hours  chlorhexidine 0.12% Liquid 15 milliLiter(s) Oral Mucosa every 12 hours  chlorhexidine 2% Cloths 1 Application(s) Topical <User Schedule>  doxazosin 8 milliGRAM(s) Oral at bedtime  erythromycin   Ointment 1 Application(s) Right EYE at bedtime  fluticasone propionate 50 MICROgram(s)/spray Nasal Spray 1 Spray(s) Both Nostrils two times a day  ofloxacin 0.3% Solution 1 Drop(s) Right EYE four times a day  pantoprazole  Injectable 40 milliGRAM(s) IV Push daily  petrolatum Ophthalmic Ointment 1 Application(s) Both EYES two times a day  propranolol 10 milliGRAM(s) Oral every 8 hours  sodium chloride 0.65% Nasal 1 Spray(s) Both Nostrils two times a day    IVF:    CULTURES:  Culture Results:   Numerous Klebsiella aerogenes (Previously Enterobacter)  Numerous Stenotrophomonas maltophilia  Commensal iram consistent with body site ( @ 20:05)  Culture Results:   No growth at 5 days ( @ 17:45)    RADIOLOGY & ADDITIONAL TESTS:      ASSESSMENT: 43 y/o PMH ?stroke/MI present to [facility] after collapsing at work. Decorticate posturing, vomiting, intubated for airway protection. 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 Transferred to [facility] for further management. s/p trach 10/14. s/p peg 10/21. Re-upgrade to ICU 2/2 desaturation event and suctioning requirements 11/15. Re-upgrade to NSICU 12/15 2/2 desaturation and tachycardia.    Neuro:  - neuro/vitals q4h  - pain control: tylenol prn  - seizure tx: keppra 1500mg BID, fosphenytoin 100mg TID  - vEEG (10/3-) negative, (10/17-10/19) negative, (-) negative, (- ) +focal motor seizures not correlating w/ EEG  - epilepsy following  - CTH : enlarged pontine hemorrhage, CTH 10/3: stable, CTH 10/25: mostly resolved pontine hemorrhage, CTH 12/15: R mastoid air cell opacification; acute otitis media vs sterile effusion, CTH : stable.  - MRI brain 10/12: parenchymal hemorrhage, acute/subacute R cerebellar stroke    - stroke neuro signed off    CV:  - -160  - tachycardia: propranolol 10mg q8  - HTN: amlodipine 5mg  - echo () EF 75%, repeat : 57%    Resp:  - trach to vent, AC/VC 40/400/14/6  - CTA PE protocol 12/15 negative   - Secretions: duonebs/mucomyst/chest PT q6h   - CT Chest  for infectious w/u: b/l LL atelectasis and opacities     GI:  - TF via PEG (placed 10/21 by gen surg)  - bowel regimen held for loose stool, last BM   - baclofen 5q12 for hiccups     Renal:  - IVL  - Urinary retenion: Gabriel replaced 12/15  - CKD: trend BUN/Cr  - renal US 10/1: echogenicity c/w chronic med renal dz, repeat 10/8: inc renal echogeicity, c/f medical renal disease w increased hydro     Endo:  - A1c 5.4    Heme:  - DVT ppx: SCDs, SQH 5000u q8h   - LE dopplers negative     ID:  - last pan cx   - empiric PNA: cefepime (-), s/p vanc (-), s/p zosyn (12/15-), s/p vanc (12/15-)  - s/p Stenotrophomonas maltophilia PNA: s/p Bactrim (-) s/p Cefepime (-)  - 11/3, (+) sputum for stenotrophomas maltophlia, blood cx (+) klebsiella, cefepime 2gq12 ( - )   - empiric tx: s/p zosyn (11/3-) , s/p vanc  (11/3-)  - S/p Ancef (10/4-10/14) for PNA, and s/p Unasyn (10/18-10/23) +actinobacter baumanii     MISC:  - ophtho consult for keratitis  - erythromycin ointment R eye q4hrs, ofloxacin ointment R eye QID, artificial tears R eye q2hrs, moisture chamber at bedtime    Dispo: SDU status, DNR, pending placement     D/w Dr. D'Amico

## 2025-01-02 LAB
ANION GAP SERPL CALC-SCNC: 10 MMOL/L — SIGNIFICANT CHANGE UP (ref 5–17)
BUN SERPL-MCNC: 49 MG/DL — HIGH (ref 7–23)
CALCIUM SERPL-MCNC: 9.4 MG/DL — SIGNIFICANT CHANGE UP (ref 8.4–10.5)
CHLORIDE SERPL-SCNC: 105 MMOL/L — SIGNIFICANT CHANGE UP (ref 96–108)
CO2 SERPL-SCNC: 23 MMOL/L — SIGNIFICANT CHANGE UP (ref 22–31)
CREAT SERPL-MCNC: 1.31 MG/DL — HIGH (ref 0.5–1.3)
EGFR: 68 ML/MIN/1.73M2 — SIGNIFICANT CHANGE UP
EGFR: 68 ML/MIN/1.73M2 — SIGNIFICANT CHANGE UP
GLUCOSE SERPL-MCNC: 134 MG/DL — HIGH (ref 70–99)
HCT VFR BLD CALC: 31.2 % — LOW (ref 39–50)
HGB BLD-MCNC: 10.1 G/DL — LOW (ref 13–17)
MAGNESIUM SERPL-MCNC: 2.2 MG/DL — SIGNIFICANT CHANGE UP (ref 1.6–2.6)
MCHC RBC-ENTMCNC: 29.9 PG — SIGNIFICANT CHANGE UP (ref 27–34)
MCHC RBC-ENTMCNC: 32.4 G/DL — SIGNIFICANT CHANGE UP (ref 32–36)
MCV RBC AUTO: 92.3 FL — SIGNIFICANT CHANGE UP (ref 80–100)
NRBC # BLD: 0 /100 WBCS — SIGNIFICANT CHANGE UP (ref 0–0)
NRBC BLD-RTO: 0 /100 WBCS — SIGNIFICANT CHANGE UP (ref 0–0)
PHOSPHATE SERPL-MCNC: 3.1 MG/DL — SIGNIFICANT CHANGE UP (ref 2.5–4.5)
PLATELET # BLD AUTO: 280 K/UL — SIGNIFICANT CHANGE UP (ref 150–400)
POTASSIUM SERPL-MCNC: 3.9 MMOL/L — SIGNIFICANT CHANGE UP (ref 3.5–5.3)
POTASSIUM SERPL-SCNC: 3.9 MMOL/L — SIGNIFICANT CHANGE UP (ref 3.5–5.3)
RBC # BLD: 3.38 M/UL — LOW (ref 4.2–5.8)
RBC # FLD: 14.2 % — SIGNIFICANT CHANGE UP (ref 10.3–14.5)
SODIUM SERPL-SCNC: 138 MMOL/L — SIGNIFICANT CHANGE UP (ref 135–145)
WBC # BLD: 6.79 K/UL — SIGNIFICANT CHANGE UP (ref 3.8–10.5)
WBC # FLD AUTO: 6.79 K/UL — SIGNIFICANT CHANGE UP (ref 3.8–10.5)

## 2025-01-02 PROCEDURE — 99231 SBSQ HOSP IP/OBS SF/LOW 25: CPT

## 2025-01-02 PROCEDURE — 99233 SBSQ HOSP IP/OBS HIGH 50: CPT

## 2025-01-02 RX ADMIN — Medication 1 APPLICATION(S): at 18:07

## 2025-01-02 RX ADMIN — Medication 1 DROP(S): at 09:41

## 2025-01-02 RX ADMIN — ACETYLCYSTEINE 4 MILLILITER(S): 200 INHALANT RESPIRATORY (INHALATION) at 05:30

## 2025-01-02 RX ADMIN — ACETYLCYSTEINE 4 MILLILITER(S): 200 INHALANT RESPIRATORY (INHALATION) at 18:08

## 2025-01-02 RX ADMIN — OFLOXACIN 1 DROP(S): 3 SOLUTION OPHTHALMIC at 18:07

## 2025-01-02 RX ADMIN — Medication 1 DROP(S): at 22:04

## 2025-01-02 RX ADMIN — FOSPHENYTOIN SODIUM 104 MILLIGRAM(S) PE: 50 INJECTION INTRAMUSCULAR; INTRAVENOUS at 05:31

## 2025-01-02 RX ADMIN — Medication 15 MILLILITER(S): at 18:08

## 2025-01-02 RX ADMIN — Medication 1 SPRAY(S): at 18:08

## 2025-01-02 RX ADMIN — LEVETIRACETAM 1500 MILLIGRAM(S): 10 INJECTION, SOLUTION INTRAVENOUS at 05:30

## 2025-01-02 RX ADMIN — IPRATROPIUM BROMIDE AND ALBUTEROL SULFATE 3 MILLILITER(S): .5; 2.5 SOLUTION RESPIRATORY (INHALATION) at 23:05

## 2025-01-02 RX ADMIN — Medication 1 SPRAY(S): at 05:32

## 2025-01-02 RX ADMIN — OFLOXACIN 1 DROP(S): 3 SOLUTION OPHTHALMIC at 11:41

## 2025-01-02 RX ADMIN — FLUTICASONE PROPIONATE 1 SPRAY(S): 50 SPRAY, METERED NASAL at 18:08

## 2025-01-02 RX ADMIN — HEPARIN SODIUM 5000 UNIT(S): 1000 INJECTION INTRAVENOUS; SUBCUTANEOUS at 21:59

## 2025-01-02 RX ADMIN — Medication 15 MILLILITER(S): at 05:28

## 2025-01-02 RX ADMIN — Medication 1 DROP(S): at 18:07

## 2025-01-02 RX ADMIN — IPRATROPIUM BROMIDE AND ALBUTEROL SULFATE 3 MILLILITER(S): .5; 2.5 SOLUTION RESPIRATORY (INHALATION) at 11:41

## 2025-01-02 RX ADMIN — IPRATROPIUM BROMIDE AND ALBUTEROL SULFATE 3 MILLILITER(S): .5; 2.5 SOLUTION RESPIRATORY (INHALATION) at 05:30

## 2025-01-02 RX ADMIN — Medication 1 APPLICATION(S): at 05:28

## 2025-01-02 RX ADMIN — FOSPHENYTOIN SODIUM 104 MILLIGRAM(S) PE: 50 INJECTION INTRAMUSCULAR; INTRAVENOUS at 22:00

## 2025-01-02 RX ADMIN — OFLOXACIN 1 DROP(S): 3 SOLUTION OPHTHALMIC at 05:28

## 2025-01-02 RX ADMIN — HEPARIN SODIUM 5000 UNIT(S): 1000 INJECTION INTRAVENOUS; SUBCUTANEOUS at 13:27

## 2025-01-02 RX ADMIN — BACLOFEN 5 MILLIGRAM(S): 10 INJECTION INTRATHECAL at 16:06

## 2025-01-02 RX ADMIN — AMLODIPINE BESYLATE 5 MILLIGRAM(S): 10 TABLET ORAL at 05:31

## 2025-01-02 RX ADMIN — HEPARIN SODIUM 5000 UNIT(S): 1000 INJECTION INTRAVENOUS; SUBCUTANEOUS at 05:30

## 2025-01-02 RX ADMIN — ERYTHROMYCIN 1 APPLICATION(S): 5 OINTMENT OPHTHALMIC at 22:03

## 2025-01-02 RX ADMIN — Medication 1 DROP(S): at 05:28

## 2025-01-02 RX ADMIN — LEVETIRACETAM 1500 MILLIGRAM(S): 10 INJECTION, SOLUTION INTRAVENOUS at 18:06

## 2025-01-02 RX ADMIN — ACETYLCYSTEINE 4 MILLILITER(S): 200 INHALANT RESPIRATORY (INHALATION) at 11:41

## 2025-01-02 RX ADMIN — IPRATROPIUM BROMIDE AND ALBUTEROL SULFATE 3 MILLILITER(S): .5; 2.5 SOLUTION RESPIRATORY (INHALATION) at 18:06

## 2025-01-02 RX ADMIN — FOSPHENYTOIN SODIUM 104 MILLIGRAM(S) PE: 50 INJECTION INTRAMUSCULAR; INTRAVENOUS at 13:29

## 2025-01-02 RX ADMIN — OFLOXACIN 1 DROP(S): 3 SOLUTION OPHTHALMIC at 23:07

## 2025-01-02 RX ADMIN — Medication 1 DROP(S): at 02:09

## 2025-01-02 RX ADMIN — BACLOFEN 5 MILLIGRAM(S): 10 INJECTION INTRATHECAL at 05:31

## 2025-01-02 RX ADMIN — DOXAZOSIN MESYLATE 8 MILLIGRAM(S): 8 TABLET ORAL at 21:59

## 2025-01-02 RX ADMIN — Medication 1 DROP(S): at 13:27

## 2025-01-02 RX ADMIN — ACETYLCYSTEINE 4 MILLILITER(S): 200 INHALANT RESPIRATORY (INHALATION) at 23:04

## 2025-01-02 RX ADMIN — FLUTICASONE PROPIONATE 1 SPRAY(S): 50 SPRAY, METERED NASAL at 05:28

## 2025-01-02 RX ADMIN — Medication 40 MILLIGRAM(S): at 11:41

## 2025-01-02 NOTE — PROGRESS NOTE ADULT - SUBJECTIVE AND OBJECTIVE BOX
O/N Events: GEORGE  Subjective/ROS: Unable to fully perform given non verbal status    VITALS  Vital Signs Last 24 Hrs  T(C): 36.4 (02 Jan 2025 08:53), Max: 36.7 (01 Jan 2025 14:00)  T(F): 97.5 (02 Jan 2025 08:53), Max: 98.1 (01 Jan 2025 14:00)  HR: 62 (02 Jan 2025 09:27) (60 - 72)  BP: 103/67 (02 Jan 2025 09:27) (103/67 - 142/98)  BP(mean): 80 (02 Jan 2025 09:27) (80 - 116)  RR: 16 (02 Jan 2025 09:27) (14 - 18)  SpO2: 99% (02 Jan 2025 09:27) (98% - 100%)    Parameters below as of 02 Jan 2025 09:27  Patient On (Oxygen Delivery Method): ventilator    O2 Concentration (%): 40    I&O's Summary    01 Jan 2025 07:01  -  02 Jan 2025 07:00  --------------------------------------------------------  IN: 1270 mL / OUT: 2610 mL / NET: -1340 mL        CAPILLARY BLOOD GLUCOSE      POCT Blood Glucose.: 126 mg/dL (01 Jan 2025 17:20)  POCT Blood Glucose.: 125 mg/dL (01 Jan 2025 12:20)      PHYSICAL EXAM  General: comfortable-appearing, no WOB on trach collar  HEENT: tracheostomy clean  Lungs: no crackles, no wheezes  Heart: regular  Abdomen: soft, no visible tenderness, + BS, +PEG  Extremities: warm, no edema, not moving to command    MEDICATIONS  (STANDING):  acetylcysteine 10%  Inhalation 4 milliLiter(s) Inhalation every 6 hours  albuterol/ipratropium for Nebulization 3 milliLiter(s) Nebulizer every 6 hours  amLODIPine   Tablet 5 milliGRAM(s) Oral daily  artificial tears (preservative free) Ophthalmic Solution 1 Drop(s) Right EYE every 4 hours  baclofen 5 milliGRAM(s) Oral every 12 hours  chlorhexidine 0.12% Liquid 15 milliLiter(s) Oral Mucosa every 12 hours  chlorhexidine 2% Cloths 1 Application(s) Topical <User Schedule>  doxazosin 8 milliGRAM(s) Oral at bedtime  erythromycin   Ointment 1 Application(s) Right EYE at bedtime  fluticasone propionate 50 MICROgram(s)/spray Nasal Spray 1 Spray(s) Both Nostrils two times a day  fosphenytoin IVPB 100 milliGRAM(s) PE IV Intermittent three times a day  heparin   Injectable 5000 Unit(s) SubCutaneous every 8 hours  levETIRAcetam 1500 milliGRAM(s) Oral two times a day  ofloxacin 0.3% Solution 1 Drop(s) Right EYE four times a day  pantoprazole  Injectable 40 milliGRAM(s) IV Push daily  petrolatum Ophthalmic Ointment 1 Application(s) Both EYES two times a day  propranolol 10 milliGRAM(s) Oral every 8 hours  sodium chloride 0.65% Nasal 1 Spray(s) Both Nostrils two times a day    MEDICATIONS  (PRN):  acetaminophen   Oral Liquid .. 650 milliGRAM(s) Oral every 6 hours PRN Temp greater or equal to 38.5C (101.3F), Mild Pain (1 - 3)      Allergy Status Unknown      LABS                        10.1   6.79  )-----------( 280      ( 02 Jan 2025 05:30 )             31.2     01-02    138  |  105  |  49[H]  ----------------------------<  134[H]  3.9   |  23  |  1.31[H]    Ca    9.4      02 Jan 2025 05:30  Phos  3.1     01-02  Mg     2.2     01-02        Urinalysis Basic - ( 02 Jan 2025 05:30 )    Color: x / Appearance: x / SG: x / pH: x  Gluc: 134 mg/dL / Ketone: x  / Bili: x / Urobili: x   Blood: x / Protein: x / Nitrite: x   Leuk Esterase: x / RBC: x / WBC x   Sq Epi: x / Non Sq Epi: x / Bacteria: x            IMAGING/EKG/ETC: reviewed

## 2025-01-02 NOTE — PROGRESS NOTE ADULT - ASSESSMENT
44M with unclear PMH (?stroke, MI) who was found down at work, intubated for airway protection and found to have acute parenchymal hemorrhage within nabil with mass effect (+ acute/subacute right cerebellar infarct) in setting of hypertensive emergency, transferred to [facility] for further neurosurgical care. Hospital course c/b poor neurologic recovery s/p trach-PEG, AUR s/p gabriel, ANIKA on CKD c/b hyperkalemia, HAP s/p amp-sulbactam (EOT 10/23), K aerogenes bacteremia  treated with 2 weeks of Cefepime per ID , On 11/15 he became septic and was transferred to NSICU due to increased o2 requirements and needed Vent support , Trach Cultures + for Stenotrophomonas which was treated with 7 days of Bactrim per ID , has been weaned of Vent since 11/23 and is now on 10lts at 40% o2 through Trach Collar. Stepped up to the NSICU on 12/15 for hypoxia and tachycardia. PE ruled out. On abx for 5 days. Unclear etiology. Now stepped down to 8Lach.    # Pontine hemorrhage  # Encephalopathy due to Intracranial Bleed   - Acute parenchymal hemorrhage within nabil with mass effect (+ acute/subacute right cerebellar infarct) in setting of hypertensive emergency.   - MRI brain also demonstrated acute/subacute R cerebellar stroke.   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  -Will continue to monitor Na,     # Seizures  - Continue Seizure precautions   - Continue  Keppra and Fosphenytoin     # Suspected Hospital Acquired Pneumonia   -Pt is afebrile and no leukocytosis   - Pt had a CT Chest on 12/24 with evidence of Bilateral lower lobe infiltrates   - Pt completed the abx tx course of  Cefepime on12/30.   Monitor blood cx and trach cultures     # Hypertension  -Pt with elevated BP this morning with SBP in the 150's  - Continue amlodipine 5mg daily   -Will continue to monitor BP and uptitrate antihypertensive if required     # Acute kidney injury  # Acute Tubular Necrosis superimposed on CKD stage III  - Pt s/p US renal with chronic medical renal disease  - Elevated BUN , Not on any meds that can elevate BUN , Hgb Stable   - Cr 1.28 within pt baseline range, ctm     # Chronic respiratory failure.   -Pt s/p percutaneous trach by pulm on 10/14/24  - Currently on Vent Support , No Increase in O2 requirement   - Continue Chest PT    # Neurogenic dysphagia.   - Pt s/p PEG with surgery 10/21/24  - TF per nutrition  - aspiration precautions and elevation of HOB.    #Acute urinary retention.   - doxazosin 8mg    # Functional quadriplegia in  setting of brainstem hemorrhage  - decub precautions  - care per nursing protocol     #Hypokalemia  -Pt with K of 3.4 this morning s/p repletion   -Will continue to monitor K and replete prn     # DVT prop  - Heparin SQ q8     # Dispo:   Pending Prucol , Court Appointed Guardian Kenna , Jason Aguayo   - Patient is currently DNR but will proceed without de-escalation at this time

## 2025-01-02 NOTE — PROGRESS NOTE ADULT - SUBJECTIVE AND OBJECTIVE BOX
HPI:  Unknown age male, unknown past medical history (reported stroke and MI by coworkers) presented to [facility] with AMS, Pt was working at [employer] when he was found down by coworkers. EMS called and pt brought to [facility] ED. Intubated, sedated, started on cardene for SBPs in 200s. CT head showed brain stem bleed. Transferred to NSICU for further management.  (30 Sep 2024 12:55)    OVERNIGHT EVENTS: GEORGE overnight, neuro stable.         Hospital Course:   : transferred from [facility]. A line placed. Versed dc'd. Cy Rader at bedside, states pt has family in [place], cannot confirm medications or PMH other than stroke and MI. 250cc bolus 3% given. LR switched to NS. hydralazine 25q8 started, 3% started, switched propofol to precedex   10/1: stability CTH done. Added labetalol, started TF. Palliative consulted. ethics consulted to determine surrogate. febrile 103, pan cx sent  10/2: BD 2, GEORGE overnight. TF resumed. Desatt'd to 80s, FiO2 inc. to 50. Fentanyl given, ABG, CXR ordered. Maxxed on precedex, started on propofol for DARIEN -4 - -5. Precedex dc'd. Duonebs, mucomyst, hypertonic added. 3% dc'd. Cardene dc'd. Start vanc/CTX. Increased labetalol 200q8. MRSA negative, dc'd vanc. ETT pulled back 2cm x 2, good positioning after confirmatory chest xray. Ethics attempting to establish HCP with family. Na 159, starting FW 250q6 for range 150-155.   10/3: BD3, GEORGE o/n, neuro stable. Na elevating, FW increased to 300q6. Dc'd bowel reg for diarrhea. vEEG started. SQH 5000q8 tonight.   10/4: BD 4, albumn bolus, incr. LR to 80 2/2 incr. in Cr, LR to 10 0cc/hr for uptrending Cr. Started 7% hypersal for 48hrs and SL atropine for inline/oral thick secretions. Dc'd CTX and started ancef for MSSA in the sputum. Nephrology consulted for CKD, f/u recs. SBP 170s, given hydralazine 10mg IVP.   10/5: BD5, o/n 10mg IVP hydralazine given for SBP 170s and started on hydralazine 25q8 via OGT. 10mg IV push labetalol for SBP > 160s. RT placed for diarrhea.   10/6: BD6, o/n FW increased to 350q4 per nephrology recs. IV tylenol for temp 100.6, SBp 160s presumed uncomfortable.   10/7: BD7, overnight pancultured for temp 101.8F.   10/8: BD8. GEORGE. Cr bumped. decreased LR to 75cc/hr. Adding simethicone ATC. incr hydralazine 50mgTID. Incr labetalol 300mgTID. Na 145, decreased FWF to 250q6. Start precedex. FENa consistent with intrinsic kidney injury. Pend repeat renal US. Retaining up to 1.3L, bladder scans q6, straight cath PRN  10/9: BD 9. GEORGE overnight. Neuro stable. abd xray for distention w non-specific gas pattern, OGT to LIWS for morning. duonebs/mucomyst to q8 for improving secretions. Changed tube feeds to Jevity 1.5 20cc/hr, low rate due to abdominal distention, nepro dense and more difficult to digest. Tolerating CPAP, confirmed by ABG.   10/10: BD 10. GEORGE overnight. Neuro stable. (+) gabriel for urinary retention on bladder scan. inc TF to goal rate of 40cc/hr. family leaning toward pursuing trach/PEG. 1/2 amp for FS 81.   10/11: BD 11. GEORGE overnight. Neuro stable. Trach/PEG consults placed.   10/12: BD 12. GEORGE overnight. Neuro stable. MRI brain complete.   10/13: BD 13. Increase flomax. Hold SQH after PM dose for trach tm. IVL.   10/14: BD 14. GEORGE overnight, remains on AC/VC. Gabriel placed for urinary retention. Dc'd free water.  S/p trach with pulm. NGT placed and CXR confirmed in good position.   10/15: BD 15, GEORGE ovn. resumed feeds. spiked 101, pan cx sent.   10/16: BD 16. GEORGE ovn. Lokelma 5mg for K+ 5.4. Started vanc q 24/zosyn for empiric PNA coverage, IVF to 100/hr. PEG held for fever.   10/17: BD 17,  ordered serum osm and urine osm for am. Started sinemet for neurostimulation. Increased cardura to 0.8. Started FW 100q4, dc'd IVF. MRSA negative, dc'd vanc. NGT replaced d/t coiling.   10/18: BD 18, GEORGE overnight, neuro stable. Amantadine added for neurostim. zosyn changed to unasyn for acinetobacter baumannii, failed TOV and required SC  10/19: BD 19, GEORGE ovn. cardura 2mg added for retention. labetalol decreased 200q8, hydralazine decreased 25q8. Gabriel replaced.   10/20: BD20, GEORGE overnight. NGT dislodged, replaced. PEG tomorrow w/ gen surg, FW increased to 150q4 and labetalol decreased to 100q8, lokelma given for hyperkalemia.   10/21: BD 21. POD0 PEG placement with Gen surg. decr labetolol to 50q8, incr. cardura to 0.4, started lokelma and phoslo, dc gabriel POD0 PEG placement with Gen surg.  10/22: BD 22. Plan to start TF today via PEG. dc labetalol, Following ophtho recs. Increased apnea settings - found to be in cheyne-moe respiration. CPAP 5/5.  10/23: BD 23. hydralazine d/c'd, trach collar trial today. Rectal tube placed at 6am.  10/24: BD24, o/n lokelma held due to diarrhea. Free water 100q6 resumed. dc'd tamsulosin, amantadine. Incr'd cardura to 8mg qhs. Dc'd FW. Switched jevity to nepro. gabriel placed for high urine output. Started SL atropine for oral secretions. Dc'd free water.  10/25: BD25, o/n decreased suctioning requirements to > q4hrs, GEORGE. Cr improving, cont phoslo, lokelma held at this time. Gabriel placed yest, cont. Tolerating trach collar. Given 500cc plasmalyte bolus for ANIKA. Dc'd sinemet.   10/26: BD26, o/n resumed lokelma 5mg daily and resumed 100cc free water q6hrs. Change in neuro status with new right pupillary dilation with anisocoria (right pupil 6mm fixed and left pupil 3mm briskly reactive). Given 23.4% NaCl bullet, taken for emergent CTH showing mostly resolved pontine hemorrhage, continued brainstem hypodensity likely edema d/t hemorrhage, no new hemorrhage or infarct, no herniation, mild increase in size of left lateral ventricle. Vitals remaine stable. Na goal > 140.   10/27: BD27, o/n GEORGE.Neuro stable. Pend stepdown with airway bed.   10/28: BD 28. GEORGE overnight. Neuro stable. Miralax ordered. Gabriel removed, pending TOV.  10/29: BD 29. GEORGE o/n. Given 2L NS over 8 hrs for increased BUN/Cr ratio. Gabriel placed for frequent straight cath.   10/30: BD 30.   10/31: BD 31. GEORGE overnight. Na 149, increased free water to 200q6. 1L NS for uptrending BUN.   : BD 32. GEORGE overnight. Given 1L NS for dehydration. Na 146, increased FW to 250q6.   : BD 33 GEORGE overnight, neuro stable, given 500cc bolus for net negative status and tachycardia   11/3: BD 34, GEORGE overnight, neuro stable. Patient remains tachycardic, EKG showing sinus tachycardia, given additional 500cc NS bolus. Febrile to 101.9F, pan cultured (without UA), CXR WNL, given tylenol.   : BD 35, GEORGE overnight, neuro stable. Given 1L NS for tachycardia. sputum (+) for stenotropohomas maltophilia.   : BD 36 GEORGE overnight, neuro stable. Vancomycin dc'd. Chest PT BID. ID consulted, cont zosyn.  : BD 37. blood cx + klebsiella dc zosyn changed to cefepime, CTAP ordered, rpt blood cx sent.    : BD 38. Pending CT A/P, given 250cc bolus and starting maintenance fluids overnight. Pending CT A/P after bolus   : BD 39. CT CAP negative for infection.   : BD 40. GEORGE overnight.  11/10: BD 41. GEORGE overnight. desat to 85 on trach collar, O2 inc to 10L and 100%, O2 sat inc to 95. pt tachy to 110s, euvolemic. given tylenol. ABG and CXR ordered. spiked fever, pancultured, RVP negative. AM ABG w pO2 79, rpt w pO2 79. pt appears comfortable, satting 94%.   : BD 42. GEORGE overnight. pt became tachy to 130s, desat to 90 on 100% FiO2 and 10L. suctioned, (+) productive cough. temp 101.4, given 1g IV tylenol and 500cc NS bolus for euvolemia. fever and HR downtrending. LE dopplers negative for dvt  : BD 43, GEORGE ovn, fever and HR downtrending, satting 97% 70% FIO2  : BD 44, GEORGE ovn. started standing tylenol x24 hours for tachycardia. desat to 80s, o2 increased. CXR stable, pending CTA PE protocol.   : BD 45, GEORGE overnight, neuro stable. resp therapy dec FiO2 to 70%.   11/15: BD 46, GEORGE overnight, neuro stable.  Rapid called for desaturation 30s, tachycardic 140s. Patient bagged, 100% fio2, heavily suctioned. CXR/POCUS unremarkable. ABG c/w desaturation. WBC 14.71. Afebrile. O2 improved to 90s and patient upgraded to ICU. ABG paO2 30s improved to 89 on vent. IV Tylenol x 1, sputum sent. Start protonix while o-n vent.   : BD 47. POCUS showed collapsable IVCF, given 1L bolus. Vanco/Cefpime added empriic for PNA, NGT feeds restarted. MRSA swab neg, Vanc DC'd.   : BD 48. GEORGE overnight. 1l bolus for tachycardia. Spiked to 101, cultured. 500cc bolus for tachycardia, tachy to 148 given 25mcg fentanyl, 250cc albumin, 1.5L bolus. 5 IV lopressor with response HR to 100s. +Stenotrophomonas on sputum cx.   : BD 49. GEORGE overnight. Consulted ID, cefepime switched to bactrim x7days. Started hydrochlorothiazine 12.5mg daily.  : BD 50. Tachy 120s, given tylenol and 500cc NS. Tolerating 5/5, switched to TCx. Holding phos binder. D/c Bactrim. D/c gabriel, f/u TOV. Dc'd PPI.   : BD 51. GEORGE ovn. 1600 satting low 90s, mildly tachy to 110s, afebrile, RR wnl. O2 improved to mid 90s while inc O2 to 100% on TCx. CPAP 5/5 placed back on.  : BD 52, GEORGE ovn, tolerating CPAP 5/5. Switch to trach collar during the day if tolerating well. HCTZ held for Cr bump, straight cath frequence increased to q4  : BD 53, GEORGE ovn. Resumed phoslo. Gabriel placed. Resumed HCTZ.   : BD 54. Holding tylenol in setting of possible fever, will require pan cx if febrile. Cr improved today. Cont CPAP. Bowel regimen held i/s/o diarrhea. FOBT negative.  : BD 55. GEORGE overnight. Neuro stable. HCTZ dc'ed, started lisinopril 5. Lokelma dc'ed for K 3.7.   : BD 56, GEORGE overnight. Neuro stable. dc'd lisinopril 5mg. Gabriel dc'd. TOV. 1545 noted to be hypotensive, MAP 50, in supine position on chair, HR 60s, afebrile, O2 96%. Given 1L cc NS bolus, placed back on bed in reverse trendelenberg, improved to map of 66. Neostick at bedside. Vitals check q1h. Dc'ed amlodipine. Failed TOV, bladder scan q6, sc prn. Added back Senna.   : BD 57, GEORGE overnight. Neuro stable. Dc'd phoslo.   : BD 58, GEORGE overnight. Neuro stable.    : BD 59. Gabriel replaced.   : GEORGE.  : GEORGE, neuro stable.   : BD 62, GEORGE overnight  : BD 63, GEORGE overnight.; Given 1L bolus of LR for uptrending BUN/Cr.  12/3: BD 64. Reinstated eye gtt/moisture chamber given increased Rt eye injection  : BD 65. GEORGE overnight. Attempted to speak with ophtho regarding eyelid weight/closure but no answer, full mailbox.   : BD 66, GEORGE overnight, bowel regimen increased and had BM.   : BD 67, GEORGE overnight, neuro stable.  : GEORGE overnight, neuro stable. /110s, given x1 hydralazine 10 mg IVP. Restarted home amlodipine 5mg.  : GEORGE. OOB to chair.     : GEORGE, mucomyst added for thick secretions, simethicone for abd distension, abd xray with stool burden, increased bowel regimen.   : GEORGE overnight.   : GEORGE overnight.   : GEORGE overnight  12/15: o/n Patient became tachycardic HR 120s and 10 minutes later O2 sat dropped as low as 89%. Patient suctioned without improvement in O2 sat and tube feeds found in suction catheter. TFs held.  STAT CXR ordered. STAT labs sent. Respiratory therapist called to bedside and patient trach connected to ventilator. After connection to ventilator and further suctioning O2 sat improved to 97% but patient HR remains 120-130s. Upgraded to NSICU for further management. Vancomycin and zosyn started. CTA  chest PE protocol and CTH ordered. Blood cultures sent.given 500cc bolus, rpt ABG sent pO2 243, CTH and CTA chest done. FS while NPO, FiO2 dec 50 pending ABG. sputum cx positive for few GPC and GNR.   : GEORGE overnight, restarted amlodipine, troponin 75   : GEORGE overnight, neuro stable. Attempted CPAP this morning, did not tolerate and back on full vent support. Dc'd Vanc. Tachycardia to 140s, noted extremities to be twitching along with jaw twitching. Given 2g of Keppra, total 4mg ativan and placed on EEG, full set of labs and lactate negative. Resumed trickle feeds at 20cc/hr. Given 250cc albumin for tachycardia.   : GEORGE overnight. Neuro stable. CTH stable. EEG negative and dc'd.   : GEORGE overnight. neuro stable. SIMV most of day, AC/VC at night.   : GEORGE overnight, neuro stable. remains on VC/AC  : GEORGE ovn. 1L bolus for tachy to 120s-130s.   : GEORGE ovn. Tachy to 120s, given tylenol and IVF. 2mg IV ativan given for L jaw twitching. Sx resolved for 3 minutes and started again. Epilepsy contacted. Given 2mg IV ativan. Continued twitching. Increased keppra to 1500mg BID, 2mg ativan given. Propranolol started for refractory tachycardia. vEEG ordered. albumin given. POCUS performed, no b lines. Started on fosphenytoin, loaded w 20mg/kg then 100mg TID. febrile, pancx, started cefepime 2g q8, vancomycin  : GEORGE ovn. +focal motor seizures on eeg. ID consulted. Vancomycin dc'ed as per ID.  : GEORGE ovn, neuro stable. Baclofen 5 mg q12 started for hiccups. Na 129 from 134. Urine lytes c/w SIADH. Given 250cc 3% bolus. CT chest for infection w/u - f/u read. Repeat Na 136. UA negative  : GEORGE ovn.   : GEORGE ovn  : GEORGE overnight. Blood cx neg @ 4 days, DC cefepime per medicine (sputum colonized).   : Desaturation o/n to 80's, CXR obtained, pulse ox changed and sats resolved. Na 133 from 138, 250cc 3% bolus given. Cefepime resumed until  per ID. Rpt Na 138.  : GEORGE overnight. Na stable.   : GEORGE overnight. Family meeting with son, pt now DNR.   : GEORGE overnight.   : GEORGE overnight, neuro stable.  : GEORGE overnight, neuro stable.      Vital Signs Last 24 Hrs  T(C): 36.6 (2025 21:46), Max: 36.8 (2025 06:23)  T(F): 97.9 (2025 21:46), Max: 98.3 (2025 06:23)  HR: 72 (2025 23:05) (66 - 76)  BP: 127/89 (2025 23:05) (115/71 - 152/99)  BP(mean): 104 (2025 23:05) (89 - 119)  RR: 16 (2025 23:05) (14 - 20)  SpO2: 99% (2025 23:05) (98% - 100%)    Parameters below as of 2025 23:05  Patient On (Oxygen Delivery Method): ventilator    O2 Concentration (%): 40    I&O's Detail    31 Dec 2024 07:01  -  2025 07:00  --------------------------------------------------------  IN:    Enteral Tube Flush: 750 mL    Miscellaneous Tube Feedin mL  Total IN: 1530 mL    OUT:    Indwelling Catheter - Urethral (mL): 1700 mL  Total OUT: 1700 mL    Total NET: -170 mL      2025 07:01  -  2025 00:17  --------------------------------------------------------  IN:    Miscellaneous Tube Feedin mL  Total IN: 720 mL    OUT:    Indwelling Catheter - Urethral (mL): 1810 mL  Total OUT: 1810 mL    Total NET: -1090 mL        I&O's Summary    31 Dec 2024 07:01  -  2025 07:00  --------------------------------------------------------  IN: 1530 mL / OUT: 1700 mL / NET: -170 mL    2025 07:01  -  2025 00:17  --------------------------------------------------------  IN: 720 mL / OUT: 1810 mL / NET: -1090 mL        PHYSICAL EXAM:  Constitutional: NAD, resting comfortably in bed.   HEENT: Pupils equal, round, reactive to light  Respiratory: +Trach to vent. No respiratory distress, symmetric chest rise   Cardiovascular: RRR, S1, S2.   Gastrointestinal: +PEG, Abdomen soft, non tender, nondistended.  Neurological:  AAOX0. Opens eyes to voice, tracks vertically.   Motor: RUE trace HG 1/5. LUE 0/5, RLE TF vs.wd to noxious, LLE brisk wd to noxious  Sensation: unable to assess d/t mental status  Extremities: Warm, well perfused.  Wounds: none      TUBES/LINES:  [] CVC  [] A-line  [] Lumbar Drain  [] Ventriculostomy  [] Other    DIET:  [] NPO  [] Mechanical  [x] Tube feeds    LABS:    Urinalysis Basic - ( 2025 19:27 )    Color: x / Appearance: x / SG: x / pH: x  Gluc: 120 mg/dL / Ketone: x  / Bili: x / Urobili: x   Blood: x / Protein: x / Nitrite: x   Leuk Esterase: x / RBC: x / WBC x   Sq Epi: x / Non Sq Epi: x / Bacteria: x          CAPILLARY BLOOD GLUCOSE      POCT Blood Glucose.: 126 mg/dL (2025 17:20)  POCT Blood Glucose.: 125 mg/dL (2025 12:20)      Drug Levels: [] N/A    CSF Analysis: [] N/A      Allergies    Allergy Status Unknown    Intolerances      MEDICATIONS:  Antibiotics:    Neuro:  acetaminophen   Oral Liquid .. 650 milliGRAM(s) Oral every 6 hours PRN  baclofen 5 milliGRAM(s) Oral every 12 hours  fosphenytoin IVPB 100 milliGRAM(s) PE IV Intermittent three times a day  levETIRAcetam 1500 milliGRAM(s) Oral two times a day    Anticoagulation:  heparin   Injectable 5000 Unit(s) SubCutaneous every 8 hours    OTHER:  acetylcysteine 10%  Inhalation 4 milliLiter(s) Inhalation every 6 hours  albuterol/ipratropium for Nebulization 3 milliLiter(s) Nebulizer every 6 hours  amLODIPine   Tablet 5 milliGRAM(s) Oral daily  artificial tears (preservative free) Ophthalmic Solution 1 Drop(s) Right EYE every 4 hours  chlorhexidine 0.12% Liquid 15 milliLiter(s) Oral Mucosa every 12 hours  chlorhexidine 2% Cloths 1 Application(s) Topical <User Schedule>  doxazosin 8 milliGRAM(s) Oral at bedtime  erythromycin   Ointment 1 Application(s) Right EYE at bedtime  fluticasone propionate 50 MICROgram(s)/spray Nasal Spray 1 Spray(s) Both Nostrils two times a day  ofloxacin 0.3% Solution 1 Drop(s) Right EYE four times a day  pantoprazole  Injectable 40 milliGRAM(s) IV Push daily  petrolatum Ophthalmic Ointment 1 Application(s) Both EYES two times a day  propranolol 10 milliGRAM(s) Oral every 8 hours  sodium chloride 0.65% Nasal 1 Spray(s) Both Nostrils two times a day    IVF:    CULTURES:  Culture Results:   Numerous Klebsiella aerogenes (Previously Enterobacter)  Numerous Stenotrophomonas maltophilia  Commensal iram consistent with body site ( @ 20:05)  Culture Results:   No growth at 5 days ( @ 17:45)    RADIOLOGY & ADDITIONAL TESTS:      ASSESSMENT:  44M PMH ?stroke/MI present to [facility] after collapsing at work. Decorticate posturing, vomiting, intubated for airway protection. 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 Transferred to [facility] for further management. s/p trach 10/14. s/p peg 10/21. Re-upgrade to ICU 2/2 desaturation event and suctioning requirements 11/15. Re-upgrade to NSICU 12/15 2/2 desaturation and tachycardia.    Neuro:  - neuro/vitals q4h  - pain control: tylenol prn  - seizure tx: keppra 1500mg BID, fosphenytoin 100mg TID  - vEEG (10/3-) negative, (10/17-10/19) negative, (-) negative, (- ) +focal motor seizures not correlating w/ EEG  - epilepsy following  - CTH : enlarged pontine hemorrhage, CTH 10/3: stable, CTH 10/25: mostly resolved pontine hemorrhage, CTH 12/15: R mastoid air cell opacification; acute otitis media vs sterile effusion, CTH : stable.  - MRI brain 10/12: parenchymal hemorrhage, acute/subacute R cerebellar stroke    - stroke neuro signed off    CV:  - -160  - tachycardia: propranolol 10mg q8  - HTN: amlodipine 5mg  - echo () EF 75%, repeat : 57%    Resp:  - trach to vent, AC/VC 40/400/14/6  - CTA PE protocol 12/15 negative   - Secretions: duonebs/mucomyst/chest PT q6h   - CT Chest  for infectious w/u: b/l LL atelectasis and opacities     GI:  - TF via PEG (placed 10/21 by gen surg)  - bowel regimen held for loose stool, last BM   - baclofen 5q12 for hiccups     Renal:  - IVL  - Urinary retenion: Gabriel replaced 12/15  - CKD: trend BUN/Cr  - renal US 10/1: echogenicity c/w chronic med renal dz, repeat 10/8: inc renal echogeicity, c/f medical renal disease w increased hydro     Endo:  - A1c 5.4    Heme:  - DVT ppx: SCDs, SQH 5000u q8h   - LE dopplers negative     ID:  - last pan cx   - empiric PNA: cefepime (-), s/p vanc (-), s/p zosyn (12/15-), s/p vanc (12/15-)  - s/p Stenotrophomonas maltophilia PNA: s/p Bactrim (-) s/p Cefepime (-)  - 11/3, (+) sputum for stenotrophomas maltophlia, blood cx (+) klebsiella, cefepime 2gq12 ( - )   - empiric tx: s/p zosyn (11/3-) , s/p vanc  (11/3-)  - S/p Ancef (10/4-10/14) for PNA, and s/p Unasyn (10/18-10/23) +actinobacter baumanii     MISC:  - ophtho consult for keratitis  - erythromycin ointment R eye q4hrs, ofloxacin ointment R eye QID, artificial tears R eye q2hrs, moisture chamber at bedtime    Dispo: SDU status, DNR, pending placement     D/w Dr. D'Amico

## 2025-01-03 LAB
ANION GAP SERPL CALC-SCNC: 10 MMOL/L — SIGNIFICANT CHANGE UP (ref 5–17)
BUN SERPL-MCNC: 52 MG/DL — HIGH (ref 7–23)
CALCIUM SERPL-MCNC: 9.6 MG/DL — SIGNIFICANT CHANGE UP (ref 8.4–10.5)
CHLORIDE SERPL-SCNC: 103 MMOL/L — SIGNIFICANT CHANGE UP (ref 96–108)
CO2 SERPL-SCNC: 23 MMOL/L — SIGNIFICANT CHANGE UP (ref 22–31)
CREAT SERPL-MCNC: 1.42 MG/DL — HIGH (ref 0.5–1.3)
EGFR: 62 ML/MIN/1.73M2 — SIGNIFICANT CHANGE UP
EGFR: 62 ML/MIN/1.73M2 — SIGNIFICANT CHANGE UP
GLUCOSE SERPL-MCNC: 103 MG/DL — HIGH (ref 70–99)
HCT VFR BLD CALC: 31.4 % — LOW (ref 39–50)
HGB BLD-MCNC: 10.3 G/DL — LOW (ref 13–17)
MAGNESIUM SERPL-MCNC: 2.3 MG/DL — SIGNIFICANT CHANGE UP (ref 1.6–2.6)
MCHC RBC-ENTMCNC: 30.7 PG — SIGNIFICANT CHANGE UP (ref 27–34)
MCHC RBC-ENTMCNC: 32.8 G/DL — SIGNIFICANT CHANGE UP (ref 32–36)
MCV RBC AUTO: 93.7 FL — SIGNIFICANT CHANGE UP (ref 80–100)
NRBC # BLD: 0 /100 WBCS — SIGNIFICANT CHANGE UP (ref 0–0)
NRBC BLD-RTO: 0 /100 WBCS — SIGNIFICANT CHANGE UP (ref 0–0)
PHOSPHATE SERPL-MCNC: 3.7 MG/DL — SIGNIFICANT CHANGE UP (ref 2.5–4.5)
PLATELET # BLD AUTO: 255 K/UL — SIGNIFICANT CHANGE UP (ref 150–400)
POTASSIUM SERPL-MCNC: 3.9 MMOL/L — SIGNIFICANT CHANGE UP (ref 3.5–5.3)
POTASSIUM SERPL-SCNC: 3.9 MMOL/L — SIGNIFICANT CHANGE UP (ref 3.5–5.3)
RBC # BLD: 3.35 M/UL — LOW (ref 4.2–5.8)
RBC # FLD: 14.6 % — HIGH (ref 10.3–14.5)
SODIUM SERPL-SCNC: 136 MMOL/L — SIGNIFICANT CHANGE UP (ref 135–145)
WBC # BLD: 10.06 K/UL — SIGNIFICANT CHANGE UP (ref 3.8–10.5)
WBC # FLD AUTO: 10.06 K/UL — SIGNIFICANT CHANGE UP (ref 3.8–10.5)

## 2025-01-03 PROCEDURE — 99233 SBSQ HOSP IP/OBS HIGH 50: CPT

## 2025-01-03 RX ORDER — PHENYTOIN 100 MG/4ML
100 SUSPENSION, ORAL (FINAL DOSE FORM) ORAL EVERY 8 HOURS
Refills: 0 | Status: DISCONTINUED | OUTPATIENT
Start: 2025-01-03 | End: 2025-01-21

## 2025-01-03 RX ORDER — PHENYTOIN 100 MG/4ML
100 SUSPENSION, ORAL (FINAL DOSE FORM) ORAL THREE TIMES A DAY
Refills: 0 | Status: DISCONTINUED | OUTPATIENT
Start: 2025-01-03 | End: 2025-01-03

## 2025-01-03 RX ADMIN — Medication 1 DROP(S): at 08:54

## 2025-01-03 RX ADMIN — Medication 1 DROP(S): at 17:44

## 2025-01-03 RX ADMIN — DOXAZOSIN MESYLATE 8 MILLIGRAM(S): 8 TABLET ORAL at 21:20

## 2025-01-03 RX ADMIN — ACETYLCYSTEINE 4 MILLILITER(S): 200 INHALANT RESPIRATORY (INHALATION) at 17:45

## 2025-01-03 RX ADMIN — FLUTICASONE PROPIONATE 1 SPRAY(S): 50 SPRAY, METERED NASAL at 05:40

## 2025-01-03 RX ADMIN — FLUTICASONE PROPIONATE 1 SPRAY(S): 50 SPRAY, METERED NASAL at 17:43

## 2025-01-03 RX ADMIN — Medication 1 DROP(S): at 21:20

## 2025-01-03 RX ADMIN — Medication 1 SPRAY(S): at 05:41

## 2025-01-03 RX ADMIN — HEPARIN SODIUM 5000 UNIT(S): 1000 INJECTION INTRAVENOUS; SUBCUTANEOUS at 12:52

## 2025-01-03 RX ADMIN — Medication 1 APPLICATION(S): at 17:44

## 2025-01-03 RX ADMIN — Medication 100 MILLIGRAM(S): at 21:59

## 2025-01-03 RX ADMIN — IPRATROPIUM BROMIDE AND ALBUTEROL SULFATE 3 MILLILITER(S): .5; 2.5 SOLUTION RESPIRATORY (INHALATION) at 23:20

## 2025-01-03 RX ADMIN — Medication 1 APPLICATION(S): at 05:41

## 2025-01-03 RX ADMIN — Medication 15 MILLILITER(S): at 17:44

## 2025-01-03 RX ADMIN — Medication 1 DROP(S): at 02:12

## 2025-01-03 RX ADMIN — ACETYLCYSTEINE 4 MILLILITER(S): 200 INHALANT RESPIRATORY (INHALATION) at 12:51

## 2025-01-03 RX ADMIN — IPRATROPIUM BROMIDE AND ALBUTEROL SULFATE 3 MILLILITER(S): .5; 2.5 SOLUTION RESPIRATORY (INHALATION) at 05:37

## 2025-01-03 RX ADMIN — HEPARIN SODIUM 5000 UNIT(S): 1000 INJECTION INTRAVENOUS; SUBCUTANEOUS at 21:20

## 2025-01-03 RX ADMIN — OFLOXACIN 1 DROP(S): 3 SOLUTION OPHTHALMIC at 23:20

## 2025-01-03 RX ADMIN — OFLOXACIN 1 DROP(S): 3 SOLUTION OPHTHALMIC at 05:41

## 2025-01-03 RX ADMIN — HEPARIN SODIUM 5000 UNIT(S): 1000 INJECTION INTRAVENOUS; SUBCUTANEOUS at 05:29

## 2025-01-03 RX ADMIN — BACLOFEN 5 MILLIGRAM(S): 10 INJECTION INTRATHECAL at 05:30

## 2025-01-03 RX ADMIN — IPRATROPIUM BROMIDE AND ALBUTEROL SULFATE 3 MILLILITER(S): .5; 2.5 SOLUTION RESPIRATORY (INHALATION) at 17:45

## 2025-01-03 RX ADMIN — Medication 40 MILLIGRAM(S): at 12:52

## 2025-01-03 RX ADMIN — ACETYLCYSTEINE 4 MILLILITER(S): 200 INHALANT RESPIRATORY (INHALATION) at 05:30

## 2025-01-03 RX ADMIN — Medication 1 DROP(S): at 12:52

## 2025-01-03 RX ADMIN — Medication 1 SPRAY(S): at 17:44

## 2025-01-03 RX ADMIN — ACETYLCYSTEINE 4 MILLILITER(S): 200 INHALANT RESPIRATORY (INHALATION) at 23:20

## 2025-01-03 RX ADMIN — BACLOFEN 5 MILLIGRAM(S): 10 INJECTION INTRATHECAL at 17:45

## 2025-01-03 RX ADMIN — FOSPHENYTOIN SODIUM 104 MILLIGRAM(S) PE: 50 INJECTION INTRAMUSCULAR; INTRAVENOUS at 05:29

## 2025-01-03 RX ADMIN — ERYTHROMYCIN 1 APPLICATION(S): 5 OINTMENT OPHTHALMIC at 21:20

## 2025-01-03 RX ADMIN — IPRATROPIUM BROMIDE AND ALBUTEROL SULFATE 3 MILLILITER(S): .5; 2.5 SOLUTION RESPIRATORY (INHALATION) at 12:53

## 2025-01-03 RX ADMIN — AMLODIPINE BESYLATE 5 MILLIGRAM(S): 10 TABLET ORAL at 05:30

## 2025-01-03 RX ADMIN — LEVETIRACETAM 1500 MILLIGRAM(S): 10 INJECTION, SOLUTION INTRAVENOUS at 05:29

## 2025-01-03 RX ADMIN — Medication 1 DROP(S): at 05:30

## 2025-01-03 RX ADMIN — Medication 100 MILLIGRAM(S): at 14:01

## 2025-01-03 RX ADMIN — Medication 15 MILLILITER(S): at 05:29

## 2025-01-03 RX ADMIN — OFLOXACIN 1 DROP(S): 3 SOLUTION OPHTHALMIC at 12:50

## 2025-01-03 RX ADMIN — LEVETIRACETAM 1500 MILLIGRAM(S): 10 INJECTION, SOLUTION INTRAVENOUS at 17:46

## 2025-01-03 RX ADMIN — OFLOXACIN 1 DROP(S): 3 SOLUTION OPHTHALMIC at 17:46

## 2025-01-03 NOTE — PROGRESS NOTE ADULT - SUBJECTIVE AND OBJECTIVE BOX
HPI:  Unknown age male, unknown past medical history (reported stroke and MI by coworkers) presented to [facility] with AMS, Pt was working at [employer] when he was found down by coworkers. EMS called and pt brought to [facility] ED. Intubated, sedated, started on cardene for SBPs in 200s. CT head showed brain stem bleed. Transferred to NSICU for further management.  (30 Sep 2024 12:55)    OVERNIGHT EVENTS: GEORGE, neuro stable.     HOSPITAL COURSE:   9/30: transferred from [facility]. A line placed. Versed dc'd. Cy Rader at bedside, states pt has family in [place], cannot confirm medications or PMH other than stroke and MI. 250cc bolus 3% given. LR switched to NS. hydralazine 25q8 started, 3% started, switched propofol to precedex   10/1: stability CTH done. Added labetalol, started TF. Palliative consulted. ethics consulted to determine surrogate. febrile 103, pan cx sent  10/2: BD 2, GEORGE overnight. TF resumed. Desatt'd to 80s, FiO2 inc. to 50. Fentanyl given, ABG, CXR ordered. Maxxed on precedex, started on propofol for DARIEN -4 - -5. Precedex dc'd. Duonebs, mucomyst, hypertonic added. 3% dc'd. Cardene dc'd. Start vanc/CTX. Increased labetalol 200q8. MRSA negative, dc'd vanc. ETT pulled back 2cm x 2, good positioning after confirmatory chest xray. Ethics attempting to establish HCP with family. Na 159, starting FW 250q6 for range 150-155.   10/3: BD3, GEORGE o/n, neuro stable. Na elevating, FW increased to 300q6. Dc'd bowel reg for diarrhea. vEEG started. SQH 5000q8 tonight.   10/4: BD 4, albumn bolus, incr. LR to 80 2/2 incr. in Cr, LR to 10 0cc/hr for uptrending Cr. Started 7% hypersal for 48hrs and SL atropine for inline/oral thick secretions. Dc'd CTX and started ancef for MSSA in the sputum. Nephrology consulted for CKD, f/u recs. SBP 170s, given hydralazine 10mg IVP.   10/5: BD5, o/n 10mg IVP hydralazine given for SBP 170s and started on hydralazine 25q8 via OGT. 10mg IV push labetalol for SBP > 160s. RT placed for diarrhea.   10/6: BD6, o/n FW increased to 350q4 per nephrology recs. IV tylenol for temp 100.6, SBp 160s presumed uncomfortable.   10/7: BD7, overnight pancultured for temp 101.8F.   10/8: BD8. GEORGE. Cr bumped. decreased LR to 75cc/hr. Adding simethicone ATC. incr hydralazine 50mgTID. Incr labetalol 300mgTID. Na 145, decreased FWF to 250q6. Start precedex. FENa consistent with intrinsic kidney injury. Pend repeat renal US. Retaining up to 1.3L, bladder scans q6, straight cath PRN  10/9: BD 9. GEORGE overnight. Neuro stable. abd xray for distention w non-specific gas pattern, OGT to LIWS for morning. duonebs/mucomyst to q8 for improving secretions. Changed tube feeds to Jevity 1.5 20cc/hr, low rate due to abdominal distention, nepro dense and more difficult to digest. Tolerating CPAP, confirmed by ABG.   10/10: BD 10. GEORGE overnight. Neuro stable. (+) gabriel for urinary retention on bladder scan. inc TF to goal rate of 40cc/hr. family leaning toward pursuing trach/PEG. 1/2 amp for FS 81.   10/11: BD 11. GEORGE overnight. Neuro stable. Trach/PEG consults placed.   10/12: BD 12. GEORGE overnight. Neuro stable. MRI brain complete.   10/13: BD 13. Increase flomax. Hold SQH after PM dose for trach tm. IVL.   10/14: BD 14. GEOGRE overnight, remains on AC/VC. Gabriel placed for urinary retention. Dc'd free water.  S/p trach with pulm. NGT placed and CXR confirmed in good position.   10/15: BD 15, GEORGE ovn. resumed feeds. spiked 101, pan cx sent.   10/16: BD 16. GEORGE ovn. Lokelma 5mg for K+ 5.4. Started vanc q 24/zosyn for empiric PNA coverage, IVF to 100/hr. PEG held for fever.   10/17: BD 17,  ordered serum osm and urine osm for am. Started sinemet for neurostimulation. Increased cardura to 0.8. Started FW 100q4, dc'd IVF. MRSA negative, dc'd vanc. NGT replaced d/t coiling.   10/18: BD 18, GEORGE overnight, neuro stable. Amantadine added for neurostim. zosyn changed to unasyn for acinetobacter baumannii, failed TOV and required SC  10/19: BD 19, GEORGE ovn. cardura 2mg added for retention. labetalol decreased 200q8, hydralazine decreased 25q8. Gabriel replaced.   10/20: BD20, GEORGE overnight. NGT dislodged, replaced. PEG tomorrow w/ gen surg, FW increased to 150q4 and labetalol decreased to 100q8, lokelma given for hyperkalemia.   10/21: BD 21. POD0 PEG placement with Gen surg. decr labetolol to 50q8, incr. cardura to 0.4, started lokelma and phoslo, dc gabriel POD0 PEG placement with Gen surg.  10/22: BD 22. Plan to start TF today via PEG. dc labetalol, Following ophtho recs. Increased apnea settings - found to be in cheyne-moe respiration. CPAP 5/5.  10/23: BD 23. hydralazine d/c'd, trach collar trial today. Rectal tube placed at 6am.  10/24: BD24, o/n lokelma held due to diarrhea. Free water 100q6 resumed. dc'd tamsulosin, amantadine. Incr'd cardura to 8mg qhs. Dc'd FW. Switched jevity to nepro. gabriel placed for high urine output. Started SL atropine for oral secretions. Dc'd free water.  10/25: BD25, o/n decreased suctioning requirements to > q4hrs, GEORGE. Cr improving, cont phoslo, lokelma held at this time. Gabriel placed yest, cont. Tolerating trach collar. Given 500cc plasmalyte bolus for ANIKA. Dc'd sinemet.   10/26: BD26, o/n resumed lokelma 5mg daily and resumed 100cc free water q6hrs. Change in neuro status with new right pupillary dilation with anisocoria (right pupil 6mm fixed and left pupil 3mm briskly reactive). Given 23.4% NaCl bullet, taken for emergent CTH showing mostly resolved pontine hemorrhage, continued brainstem hypodensity likely edema d/t hemorrhage, no new hemorrhage or infarct, no herniation, mild increase in size of left lateral ventricle. Vitals remaine stable. Na goal > 140.   10/27: BD27, o/n GEORGE.Neuro stable. Pend stepdown with airway bed.   10/28: BD 28. GEORGE overnight. Neuro stable. Miralax ordered. Gabriel removed, pending TOV.  10/29: BD 29. GEORGE o/n. Given 2L NS over 8 hrs for increased BUN/Cr ratio. Gabriel placed for frequent straight cath.   10/30: BD 30.   10/31: BD 31. GEORGE overnight. Na 149, increased free water to 200q6. 1L NS for uptrending BUN.   11/1: BD 32. GEORGE overnight. Given 1L NS for dehydration. Na 146, increased FW to 250q6.   11/2: BD 33 GEORGE overnight, neuro stable, given 500cc bolus for net negative status and tachycardia   11/3: BD 34, GEORGE overnight, neuro stable. Patient remains tachycardic, EKG showing sinus tachycardia, given additional 500cc NS bolus. Febrile to 101.9F, pan cultured (without UA), CXR WNL, given tylenol.   11/4: BD 35, GEORGE overnight, neuro stable. Given 1L NS for tachycardia. sputum (+) for stenotropohomas maltophilia.   11/5: BD 36 GEORGE overnight, neuro stable. Vancomycin dc'd. Chest PT BID. ID consulted, cont zosyn.  11/6: BD 37. blood cx + klebsiella dc zosyn changed to cefepime, CTAP ordered, rpt blood cx sent.    11/7: BD 38. Pending CT A/P, given 250cc bolus and starting maintenance fluids overnight. Pending CT A/P after bolus   11/8: BD 39. CT CAP negative for infection.   11/9: BD 40. GEORGE overnight.  11/10: BD 41. GEORGE overnight. desat to 85 on trach collar, O2 inc to 10L and 100%, O2 sat inc to 95. pt tachy to 110s, euvolemic. given tylenol. ABG and CXR ordered. spiked fever, pancultured, RVP negative. AM ABG w pO2 79, rpt w pO2 79. pt appears comfortable, satting 94%.   11/11: BD 42. GEORGE overnight. pt became tachy to 130s, desat to 90 on 100% FiO2 and 10L. suctioned, (+) productive cough. temp 101.4, given 1g IV tylenol and 500cc NS bolus for euvolemia. fever and HR downtrending. LE dopplers negative for dvt  11/12: BD 43, GEORGE ovn, fever and HR downtrending, satting 97% 70% FIO2  11/13: BD 44, GEORGE ovn. started standing tylenol x24 hours for tachycardia. desat to 80s, o2 increased. CXR stable, pending CTA PE protocol.   11/14: BD 45, GEORGE overnight, neuro stable. resp therapy dec FiO2 to 70%.   11/15: BD 46, GEORGE overnight, neuro stable.  Rapid called for desaturation 30s, tachycardic 140s. Patient bagged, 100% fio2, heavily suctioned. CXR/POCUS unremarkable. ABG c/w desaturation. WBC 14.71. Afebrile. O2 improved to 90s and patient upgraded to ICU. ABG paO2 30s improved to 89 on vent. IV Tylenol x 1, sputum sent. Start protonix while o-n vent.   11/16: BD 47. POCUS showed collapsable IVCF, given 1L bolus. Vanco/Cefpime added empriic for PNA, NGT feeds restarted. MRSA swab neg, Vanc DC'd.   11/17: BD 48. GEORGE overnight. 1l bolus for tachycardia. Spiked to 101, cultured. 500cc bolus for tachycardia, tachy to 148 given 25mcg fentanyl, 250cc albumin, 1.5L bolus. 5 IV lopressor with response HR to 100s. +Stenotrophomonas on sputum cx.   11/18: BD 49. GEORGE overnight. Consulted ID, cefepime switched to bactrim x7days. Started hydrochlorothiazine 12.5mg daily.  11/19: BD 50. Tachy 120s, given tylenol and 500cc NS. Tolerating 5/5, switched to TCx. Holding phos binder. D/c Bactrim. D/c gabriel, f/u TOV. Dc'd PPI.   11/20: BD 51. GEORGE ovn. 1600 satting low 90s, mildly tachy to 110s, afebrile, RR wnl. O2 improved to mid 90s while inc O2 to 100% on TCx. CPAP 5/5 placed back on.  11/21: BD 52, GEORGE ovn, tolerating CPAP 5/5. Switch to trach collar during the day if tolerating well. HCTZ held for Cr bump, straight cath frequence increased to q4  11/22: BD 53, GEORGE ovn. Resumed phoslo. Gabriel placed. Resumed HCTZ.   11/23: BD 54. Holding tylenol in setting of possible fever, will require pan cx if febrile. Cr improved today. Cont CPAP. Bowel regimen held i/s/o diarrhea. FOBT negative.  11/24: BD 55. GEORGE overnight. Neuro stable. HCTZ dc'ed, started lisinopril 5. Lokelma dc'ed for K 3.7.   11/25: BD 56, GEORGE overnight. Neuro stable. dc'd lisinopril 5mg. Gabriel dc'd. TOV. 1545 noted to be hypotensive, MAP 50, in supine position on chair, HR 60s, afebrile, O2 96%. Given 1L cc NS bolus, placed back on bed in reverse trendelenberg, improved to map of 66. Neostick at bedside. Vitals check q1h. Dc'ed amlodipine. Failed TOV, bladder scan q6, sc prn. Added back Senna.   11/26: BD 57, GEORGE overnight. Neuro stable. Dc'd phoslo.   11/27: BD 58, GEORGE overnight. Neuro stable.    11/28: BD 59. Gabriel replaced.   11/29: GEORGE.  11/30: GEORGE, neuro stable.   12/1: BD 62, GEORGE overnight  12/2: BD 63, GEORGE overnight.; Given 1L bolus of LR for uptrending BUN/Cr.  12/3: BD 64. Reinstated eye gtt/moisture chamber given increased Rt eye injection  12/4: BD 65. GEORGE overnight. Attempted to speak with ophtho regarding eyelid weight/closure but no answer, full mailbox.   12/5: BD 66, GEORGE overnight, bowel regimen increased and had BM.   12/6: BD 67, GEORGE overnight, neuro stable.  12/7: GEORGE overnight, neuro stable. /110s, given x1 hydralazine 10 mg IVP. Restarted home amlodipine 5mg.  12/8: GEORGE. OOB to chair.     12/11: GEORGE, mucomyst added for thick secretions, simethicone for abd distension, abd xray with stool burden, increased bowel regimen.   12/12: GEORGE overnight.   12/13: GEORGE overnight.   12/14: GEORGE overnight  12/15: o/n Patient became tachycardic HR 120s and 10 minutes later O2 sat dropped as low as 89%. Patient suctioned without improvement in O2 sat and tube feeds found in suction catheter. TFs held.  STAT CXR ordered. STAT labs sent. Respiratory therapist called to bedside and patient trach connected to ventilator. After connection to ventilator and further suctioning O2 sat improved to 97% but patient HR remains 120-130s. Upgraded to NSICU for further management. Vancomycin and zosyn started. CTA  chest PE protocol and CTH ordered. Blood cultures sent.given 500cc bolus, rpt ABG sent pO2 243, CTH and CTA chest done. FS while NPO, FiO2 dec 50 pending ABG. sputum cx positive for few GPC and GNR.   12/16: GEORGE overnight, restarted amlodipine, troponin 75   12/17: GEORGE overnight, neuro stable. Attempted CPAP this morning, did not tolerate and back on full vent support. Dc'd Vanc. Tachycardia to 140s, noted extremities to be twitching along with jaw twitching. Given 2g of Keppra, total 4mg ativan and placed on EEG, full set of labs and lactate negative. Resumed trickle feeds at 20cc/hr. Given 250cc albumin for tachycardia.   12/18: GEORGE overnight. Neuro stable. CTH stable. EEG negative and dc'd.   12/19: GEORGE overnight. neuro stable. SIMV most of day, AC/VC at night.   12/20: GEORGE overnight, neuro stable. remains on VC/AC  12/21: GEORGE ovn. 1L bolus for tachy to 120s-130s.   12/22: GEORGE ovn. Tachy to 120s, given tylenol and IVF. 2mg IV ativan given for L jaw twitching. Sx resolved for 3 minutes and started again. Epilepsy contacted. Given 2mg IV ativan. Continued twitching. Increased keppra to 1500mg BID, 2mg ativan given. Propranolol started for refractory tachycardia. vEEG ordered. albumin given. POCUS performed, no b lines. Started on fosphenytoin, loaded w 20mg/kg then 100mg TID. febrile, pancx, started cefepime 2g q8, vancomycin  12/23: GEORGE ovn. +focal motor seizures on eeg. ID consulted. Vancomycin dc'ed as per ID.  12/24: GEORGE ovn, neuro stable. Baclofen 5 mg q12 started for hiccups. Na 129 from 134. Urine lytes c/w SIADH. Given 250cc 3% bolus. CT chest for infection w/u - f/u read. Repeat Na 136. UA negative  12/25: GEORGE ovn.   12/26: GEORGE ovn  12/27: GEORGE overnight. Blood cx neg @ 4 days, DC cefepime per medicine (sputum colonized).   12/28: Desaturation o/n to 80's, CXR obtained, pulse ox changed and sats resolved. Na 133 from 138, 250cc 3% bolus given. Cefepime resumed until 12/30 per ID. Rpt Na 138.  12/29: GEORGE overnight. Na stable.   12/30: GEORGE overnight. Family meeting with son, pt now DNR.   12/31: GEORGE overnight.   01/01: GEORGE overnight, neuro stable.  01/02: GEORGE overnight, neuro stable. FW decreased to 100q6.   01/03: GEORGE overnight, neuro stable.     Vital Signs Last 24 Hrs  T(C): 36.8 (02 Jan 2025 21:26), Max: 36.8 (02 Jan 2025 21:26)  T(F): 98.2 (02 Jan 2025 21:26), Max: 98.2 (02 Jan 2025 21:26)  HR: 68 (02 Jan 2025 23:52) (60 - 68)  BP: 125/84 (02 Jan 2025 23:52) (103/67 - 125/84)  BP(mean): 100 (02 Jan 2025 23:52) (80 - 100)  RR: 16 (02 Jan 2025 23:52) (14 - 18)  SpO2: 99% (02 Jan 2025 23:52) (97% - 100%)    Parameters below as of 02 Jan 2025 23:52  Patient On (Oxygen Delivery Method): ventilator    O2 Concentration (%): 40    I&O's Summary    01 Jan 2025 07:01  -  02 Jan 2025 07:00  --------------------------------------------------------  IN: 1270 mL / OUT: 2610 mL / NET: -1340 mL    02 Jan 2025 07:01  -  03 Jan 2025 00:01  --------------------------------------------------------  IN: 1040 mL / OUT: 625 mL / NET: 415 mL      Physical Exam   Constitutional: Lying in bed, in NAD  Respiratory: +trach to vent, breathing non-labored, symmetrical chest wall movement  Cardiovascular: RRR, no murmurs  Gastrointestinal: +PEG, abdomen soft, non tender  Neurological:  AAOX2 with choices, opens eyes spontaneously, eyes track vertically.  Cranial Nerves: PERRL 3 mm brisk, face symmetric.   Motor: RUE HG 2/5, LUE 0/5, RLE TF, LLE WD.   Sensation: unable to assess   Wounds: none       TUBES/LINES:  [x] Gabriel  [] Wound Drains  [] Others      DIET:  [] NPO  [] Mechanical  [x] Tube feeds    LABS:                        10.1   6.79  )-----------( 280      ( 02 Jan 2025 05:30 )             31.2     01-02    138  |  105  |  49[H]  ----------------------------<  134[H]  3.9   |  23  |  1.31[H]    Ca    9.4      02 Jan 2025 05:30  Phos  3.1     01-02  Mg     2.2     01-02        Urinalysis Basic - ( 02 Jan 2025 05:30 )    Color: x / Appearance: x / SG: x / pH: x  Gluc: 134 mg/dL / Ketone: x  / Bili: x / Urobili: x   Blood: x / Protein: x / Nitrite: x   Leuk Esterase: x / RBC: x / WBC x   Sq Epi: x / Non Sq Epi: x / Bacteria: x          CAPILLARY BLOOD GLUCOSE          Drug Levels: [] N/A    CSF Analysis: [] N/A      Allergies    Allergy Status Unknown    Intolerances      MEDICATIONS:  Antibiotics:    Neuro:  acetaminophen   Oral Liquid .. 650 milliGRAM(s) Oral every 6 hours PRN  baclofen 5 milliGRAM(s) Oral every 12 hours  fosphenytoin IVPB 100 milliGRAM(s) PE IV Intermittent three times a day  levETIRAcetam 1500 milliGRAM(s) Oral two times a day    Anticoagulation:  heparin   Injectable 5000 Unit(s) SubCutaneous every 8 hours    OTHER:  acetylcysteine 10%  Inhalation 4 milliLiter(s) Inhalation every 6 hours  albuterol/ipratropium for Nebulization 3 milliLiter(s) Nebulizer every 6 hours  amLODIPine   Tablet 5 milliGRAM(s) Oral daily  artificial tears (preservative free) Ophthalmic Solution 1 Drop(s) Right EYE every 4 hours  chlorhexidine 0.12% Liquid 15 milliLiter(s) Oral Mucosa every 12 hours  chlorhexidine 2% Cloths 1 Application(s) Topical <User Schedule>  doxazosin 8 milliGRAM(s) Oral at bedtime  erythromycin   Ointment 1 Application(s) Right EYE at bedtime  fluticasone propionate 50 MICROgram(s)/spray Nasal Spray 1 Spray(s) Both Nostrils two times a day  ofloxacin 0.3% Solution 1 Drop(s) Right EYE four times a day  pantoprazole  Injectable 40 milliGRAM(s) IV Push daily  petrolatum Ophthalmic Ointment 1 Application(s) Both EYES two times a day  propranolol 10 milliGRAM(s) Oral every 8 hours  sodium chloride 0.65% Nasal 1 Spray(s) Both Nostrils two times a day    IVF:    CULTURES:  Culture Results:   Numerous Klebsiella aerogenes (Previously Enterobacter)  Numerous Stenotrophomonas maltophilia  Commensal iram consistent with body site (12-25 @ 20:05)  Culture Results:   No growth at 5 days (12-22 @ 17:45)    RADIOLOGY & ADDITIONAL TESTS:      ASSESSMENT:  44M PMH ?stroke/MI present to [facility] after collapsing at work. Decorticate posturing, vomiting, intubated for airway protection. 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 Transferred to [facility] for further management. s/p trach 10/14. s/p peg 10/21. Re-upgrade to ICU 2/2 desaturation event and suctioning requirements 11/15. Re-upgrade to NSICU 12/15 2/2 desaturation and tachycardia.    Plan:   Neuro:  - neuro/vitals q4h  - pain control: tylenol prn  - seizure tx: keppra 1500mg BID, fosphenytoin 100mg TID  - vEEG (10/3-4) negative, (10/17-10/19) negative, (12/17-12/18) negative, (12/22-12/25 ) +focal motor seizures not correlating w/ EEG  - epilepsy following  - CTH 9/30: enlarged pontine hemorrhage, CTH 10/3: stable, CTH 10/25: mostly resolved pontine hemorrhage, CTH 12/15: R mastoid air cell opacification; acute otitis media vs sterile effusion, CTH 12/18: stable.  - MRI brain 10/12: parenchymal hemorrhage, acute/subacute R cerebellar stroke    - stroke neuro signed off    CV:  - -160  - tachycardia: propranolol 10mg q8  - HTN: amlodipine 5mg  - echo (9/30) EF 75%, repeat 12/16: 57%    Resp:  - trach to vent, AC/VC 40/400/14/6  - CTA PE protocol 12/15 negative   - Secretions: duonebs/mucomyst/chest PT q6h   - CT Chest 12/24 for infectious w/u: b/l LL atelectasis and opacities     GI:  - TF via PEG (placed 10/21 by gen surg)  - bowel regimen held for loose stool, last BM 12/31  - baclofen 5q12 for hiccups     Renal:  - IVL, FW 100q6 for hydration   - Urinary retenion: Gabriel replaced 12/15  - CKD: trend BUN/Cr  - renal US 10/1: echogenicity c/w chronic med renal dz, repeat 10/8: inc renal echogeicity, c/f medical renal disease w increased hydro     Endo:  - A1c 5.4    Heme:  - DVT ppx: SCDs, SQH 5000u q8h   - LE dopplers negative 12/16    ID:  - last pan cx 12/22  - empiric PNA: cefepime (12/22-12/30), s/p vanc (12/22-12/23), s/p zosyn (12/15-12/20), s/p vanc (12/15-12/16)  - s/p Stenotrophomonas maltophilia PNA: s/p Bactrim (11/18-11/19) s/p Cefepime (11/16-11/18)  - 11/3, (+) sputum for stenotrophomas maltophlia, blood cx (+) klebsiella, cefepime 2gq12 (11/6 - 11/12)   - empiric tx: s/p zosyn (11/3-11/6) , s/p vanc  (11/3-11/5)  - S/p Ancef (10/4-10/14) for PNA, and s/p Unasyn (10/18-10/23) +actinobacter baumanii     MISC:  - ophtho consult for keratitis  - erythromycin ointment R eye q4hrs, ofloxacin ointment R eye QID, artificial tears R eye q2hrs, moisture chamber at bedtime    Dispo: SDU status, DNR, pending PRUCOL for benefits     D/w Dr. D'Amico         Assessment:  Present when checked    []  GCS  E   V  M     Heart Failure: []Acute, [] acute on chronic , []chronic  Heart Failure:  [] Diastolic (HFpEF), [] Systolic (HFrEF), []Combined (HFpEF and HFrEF), [] RHF, [] Pulm HTN, [] Other    [] ANIKA, [] ATN, [] AIN, [] other  [] CKD1, [] CKD2, [] CKD 3, [] CKD 4, [] CKD 5, []ESRD    Encephalopathy: [] Metabolic, [] Hepatic, [] toxic, [] Neurological, [] Other    Abnormal Nurtitional Status: [] malnurtition (see nutrition note), [ ]underweight: BMI < 19, [] morbid obesity: BMI >40, [] Cachexia    [] Sepsis  [] hypovolemic shock,[] cardiogenic shock, [] hemorrhagic shock, [] neuogenic shock  [] Acute Respiratory Failure  []Cerebral edema, [] Brain compression/ herniation,   [] Functional quadriplegia  [] Acute blood loss anemia

## 2025-01-03 NOTE — PROGRESS NOTE ADULT - ASSESSMENT
44M with unclear PMH (?stroke, MI) who was found down at work, intubated for airway protection and found to have acute parenchymal hemorrhage within nabil with mass effect (+ acute/subacute right cerebellar infarct) in setting of hypertensive emergency, transferred to [facility] for further neurosurgical care. Hospital course c/b poor neurologic recovery s/p trach-PEG, AUR s/p gabriel, ANIKA on CKD c/b hyperkalemia, HAP s/p amp-sulbactam (EOT 10/23), K aerogenes bacteremia  treated with 2 weeks of Cefepime per ID , On 11/15 he became septic and was transferred to NSICU due to increased o2 requirements and needed Vent support , Trach Cultures + for Stenotrophomonas which was treated with 7 days of Bactrim per ID , has been weaned of Vent since 11/23 and is now on 10lts at 40% o2 through Trach Collar. Stepped up to the NSICU on 12/15 for hypoxia and tachycardia. PE ruled out. On abx for 5 days. Unclear etiology. Now stepped down to 8Lach.    # Pontine hemorrhage  # Encephalopathy due to Intracranial Bleed   - Acute parenchymal hemorrhage within nabil with mass effect (+ acute/subacute right cerebellar infarct) in setting of hypertensive emergency.   - MRI brain also demonstrated acute/subacute R cerebellar stroke.   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  -Will continue to monitor Na,   -Would increase free water to 100q4h given ANIKA    # Seizures  - Continue Seizure precautions   - Continue  Keppra and Fosphenytoin     # Suspected Hospital Acquired Pneumonia   -Pt is afebrile and no leukocytosis   - Pt had a CT Chest on 12/24 with evidence of Bilateral lower lobe infiltrates   - Pt completed the abx tx course of  Cefepime on12/30.   Monitor blood cx and trach cultures     # Hypertension  -Pt with elevated BP this morning with SBP in the 150's  - Continue amlodipine 5mg daily   -Will continue to monitor BP and uptitrate antihypertensive if required     # Acute kidney injury  # Acute Tubular Necrosis superimposed on CKD stage III  - Pt s/p US renal with chronic medical renal disease  - Elevated BUN , Not on any meds that can elevate BUN , Hgb Stable   -Worsening ANIKA     # Chronic respiratory failure.   -Pt s/p percutaneous trach by pulm on 10/14/24  - Currently on Vent Support , No Increase in O2 requirement   - Continue Chest PT    # Neurogenic dysphagia.   - Pt s/p PEG with surgery 10/21/24  - TF per nutrition  - aspiration precautions and elevation of HOB.    #Acute urinary retention.   - doxazosin 8mg    # Functional quadriplegia in  setting of brainstem hemorrhage  - decub precautions  - care per nursing protocol     #Hypokalemia  -Pt with K of 3.4 this morning s/p repletion   -Will continue to monitor K and replete prn     # DVT prop  - Heparin SQ q8     # Dispo:   Pending [facility] , Court Appointed Guardian Kenna Jason Aguayo   - Patient is currently DNR but will proceed without de-escalation at this time

## 2025-01-03 NOTE — PROGRESS NOTE ADULT - SUBJECTIVE AND OBJECTIVE BOX
O/N Events: GEORGE  Subjective/ROS: Unable to fully perform given non verbal status    VITALS  Vital Signs Last 24 Hrs  T(C): 36.8 (03 Jan 2025 09:02), Max: 36.8 (02 Jan 2025 21:26)  T(F): 98.2 (03 Jan 2025 09:02), Max: 98.2 (02 Jan 2025 21:26)  HR: 65 (03 Jan 2025 09:05) (64 - 70)  BP: 105/74 (03 Jan 2025 09:00) (105/73 - 125/84)  BP(mean): 86 (03 Jan 2025 09:00) (86 - 100)  RR: 14 (03 Jan 2025 09:05) (14 - 18)  SpO2: 98% (03 Jan 2025 09:05) (97% - 100%)    Parameters below as of 03 Jan 2025 09:05  Patient On (Oxygen Delivery Method): ventilator    O2 Concentration (%): 40    I&O's Summary    02 Jan 2025 07:01  -  03 Jan 2025 07:00  --------------------------------------------------------  IN: 1680 mL / OUT: 1425 mL / NET: 255 mL    03 Jan 2025 07:01  -  03 Jan 2025 12:01  --------------------------------------------------------  IN: 0 mL / OUT: 150 mL / NET: -150 mL    CAPILLARY BLOOD GLUCOSE    PHYSICAL EXAM  General: comfortable-appearing, no WOB on trach collar  HEENT: tracheostomy clean  Lungs: no crackles, no wheezes  Heart: regular  Abdomen: soft, no visible tenderness, + BS, +PEG  Extremities: warm, no edema, not moving to command      MEDICATIONS  (STANDING):  acetylcysteine 10%  Inhalation 4 milliLiter(s) Inhalation every 6 hours  albuterol/ipratropium for Nebulization 3 milliLiter(s) Nebulizer every 6 hours  amLODIPine   Tablet 5 milliGRAM(s) Oral daily  artificial tears (preservative free) Ophthalmic Solution 1 Drop(s) Right EYE every 4 hours  baclofen 5 milliGRAM(s) Oral every 12 hours  chlorhexidine 0.12% Liquid 15 milliLiter(s) Oral Mucosa every 12 hours  chlorhexidine 2% Cloths 1 Application(s) Topical <User Schedule>  doxazosin 8 milliGRAM(s) Oral at bedtime  erythromycin   Ointment 1 Application(s) Right EYE at bedtime  fluticasone propionate 50 MICROgram(s)/spray Nasal Spray 1 Spray(s) Both Nostrils two times a day  heparin   Injectable 5000 Unit(s) SubCutaneous every 8 hours  levETIRAcetam 1500 milliGRAM(s) Oral two times a day  ofloxacin 0.3% Solution 1 Drop(s) Right EYE four times a day  pantoprazole  Injectable 40 milliGRAM(s) IV Push daily  petrolatum Ophthalmic Ointment 1 Application(s) Both EYES two times a day  phenytoin   Suspension 100 milliGRAM(s) Oral three times a day  propranolol 10 milliGRAM(s) Oral every 8 hours  sodium chloride 0.65% Nasal 1 Spray(s) Both Nostrils two times a day    MEDICATIONS  (PRN):  acetaminophen   Oral Liquid .. 650 milliGRAM(s) Oral every 6 hours PRN Temp greater or equal to 38.5C (101.3F), Mild Pain (1 - 3)      Allergy Status Unknown      LABS                        10.3   10.06 )-----------( 255      ( 03 Jan 2025 05:30 )             31.4     01-03    136  |  103  |  52[H]  ----------------------------<  103[H]  3.9   |  23  |  1.42[H]    Ca    9.6      03 Jan 2025 05:30  Phos  3.7     01-03  Mg     2.3     01-03        Urinalysis Basic - ( 03 Jan 2025 05:30 )    Color: x / Appearance: x / SG: x / pH: x  Gluc: 103 mg/dL / Ketone: x  / Bili: x / Urobili: x   Blood: x / Protein: x / Nitrite: x   Leuk Esterase: x / RBC: x / WBC x   Sq Epi: x / Non Sq Epi: x / Bacteria: x            IMAGING/EKG/ETC: reviewed

## 2025-01-03 NOTE — CHART NOTE - NSCHARTNOTEFT_GEN_A_CORE
Admitting Diagnosis:   Patient is a 44y old  Male who presents with a chief complaint of brain stem bleed (03 Jan 2025 00:00)  PAST MEDICAL & SURGICAL HISTORY:  Acute myocardial infarction  CVA (cerebral vascular accident)      Current Nutrition Order:   Diet, NPO with Tube Feed:   Tube Feeding Modality: Gastrostomy  [employer] Renal Support 1.8  Total Volume for 24 Hours (mL): 1080  Total Number of Cans: 5  Continuous  Starting Tube Feed Rate {mL per Hour}: 30  Increase Tube Feed Rate by (mL): 10     Every 4 hours  Until Goal Tube Feed Rate (mL per Hour): 60  Tube Feed Duration (in Hours): 18  Tube Feed Start Time: 10:00  Tube Feed Stop Time: 04:00  Banatrol TF     Qty per Day:  2 (12-18-24 @ 09:33) [Active]     PO Intake: N/A... remains NPO with tube feeds as primary source of nutrition (tube feeds running at goal rate this afternoon upon RD visit)     GI Issues: fecal incontinence; last BM noted on 1/2/25; no noted diarrhea    Pain: Absence of non-verbal indicators of pain    Skin Integrity: no pressure ulcers, generalized, trace edema; bruised (ecchymosis); PEG present; Mookie: 12      01-02-25 @ 07:01 - 01-03-25 @ 07:00  --------------------------------------------------------  IN: 1680 mL / OUT: 1425 mL / NET: 255 mL    01-03-25 @ 07:01 - 01-03-25 @ 11:49  --------------------------------------------------------  IN: 0 mL / OUT: 150 mL / NET: -150 mL        Labs:   01-03    136  |  103  |  52[H]  ----------------------------<  103[H]  3.9   |  23  |  1.42[H]    Ca    9.6      03 Jan 2025 05:30  Phos  3.7     01-03  Mg     2.3     01-03    CAPILLARY BLOOD GLUCOSE    Medications:  MEDICATIONS  (STANDING):  acetylcysteine 10%  Inhalation 4 milliLiter(s) Inhalation every 6 hours  albuterol/ipratropium for Nebulization 3 milliLiter(s) Nebulizer every 6 hours  amLODIPine   Tablet 5 milliGRAM(s) Oral daily  artificial tears (preservative free) Ophthalmic Solution 1 Drop(s) Right EYE every 4 hours  baclofen 5 milliGRAM(s) Oral every 12 hours  chlorhexidine 0.12% Liquid 15 milliLiter(s) Oral Mucosa every 12 hours  chlorhexidine 2% Cloths 1 Application(s) Topical <User Schedule>  doxazosin 8 milliGRAM(s) Oral at bedtime  erythromycin   Ointment 1 Application(s) Right EYE at bedtime  fluticasone propionate 50 MICROgram(s)/spray Nasal Spray 1 Spray(s) Both Nostrils two times a day  heparin   Injectable 5000 Unit(s) SubCutaneous every 8 hours  levETIRAcetam 1500 milliGRAM(s) Oral two times a day  ofloxacin 0.3% Solution 1 Drop(s) Right EYE four times a day  pantoprazole  Injectable 40 milliGRAM(s) IV Push daily  petrolatum Ophthalmic Ointment 1 Application(s) Both EYES two times a day  phenytoin   Suspension 100 milliGRAM(s) Oral three times a day  propranolol 10 milliGRAM(s) Oral every 8 hours  sodium chloride 0.65% Nasal 1 Spray(s) Both Nostrils two times a day    MEDICATIONS  (PRN):  acetaminophen   Oral Liquid .. 650 milliGRAM(s) Oral every 6 hours PRN Temp greater or equal to 38.5C (101.3F), Mild Pain (1 - 3)    Dosing Anthropometrics:   Height for BMI (FEET)	5 Feet  Height for BMI (INCHES)	8 Inch(s)  Height for BMI (CM)	172.72 Centimeter(s)  Weight for BMI (lbs)	176.4 lb  Weight for BMI (kg)	80 kg  Body Mass Index	              26.8  ideal body weight:               154 pounds / 70 kg   % ideal body weight             114%     Weight Change: No new wt obtained since admit, strongly recommend nursing to obtain new wt to track/ trend changes     Estimated energy needs:  Weight used for calculations	ideal body weight  Estimated Energy Needs Weight (lbs)	154 lb  Estimated Energy Needs Weight (kg)	69.8 kg  Estimated Energy Needs From (christiana/kg)	25  Estimated Energy Needs To (christiana/kg)	30  Estimated Energy Needs Calculated From (christiana/kg)	1745  Estimated Energy Needs Calculated To (christiana/kg)	2094  Weight used for calculations	ideal body weight  Estimated Protein Needs Weight (lbs)	154 lb  Estimated Protein Needs Weight (kg)	69.8 kg  Estimated Protein Needs From (g/kg)	1.2  Estimated Protein Needs To (g/kg)	1.4  Estimated Protein Needs Calculated From (g/kg)	83.76  Estimated Protein Needs Calculated To (g/kg)	97.72  Estimated Fluid Needs Weight (lbs)	154 lb  Estimated Fluid Needs Weight (kg)	69.8 kg  Estimated Fluid Needs From (ml/kg)	25  Estimated Fluid Needs To (ml/kg)	30  Estimated Fluid Needs Calculated From (ml/kg)	1745  Estimated Fluid Needs Calculated To (ml/kg)	2094  Other Calculations	Pt is >100% ideal body weight, stepped back up to the ICU, thus ideal body weight used for all calculations. Needs adjusted for age, clinical course.     Subjective: This is a 44 year old male, unknown past medical history (reported stroke and MI by coworkers) presented to [facility] with AMS, Pt was working at "[employer]" when he was found down by coworkers. EMS called and pt brought to [facility] ED. Intubated, sedated, started on cardene for SBPs in 200s. CT head showed brain stem bleed. Transferred to NSICU for further management.    Patient seen at bedside on 8 lachman for nutrition follow up. Current diet order: NPO with tube feeds of Real[employer] Renal Support back at goal rate of 60 cc/hr x18 hrs (providing ~ 1944 kcal, 86g protein, 713 mL free water), meeting 100% estimated nutrient needs. GI within normal limits at this time as per flowsheets, RN reports bowel movement 1/2/25 but denies diarrhea at this time. Labs: BUN elevated (52), Creatinine elevated (1.42), medical team is aware, RD to monitor trends. Medications reviewed as above. RD will continue to follow, see nutrition recommendations below.     Previous Nutrition Diagnosis: Inadequate Oral Intake related to inability to meet nutritional demands via PO as evidenced by NPO with tube feedings    Active [ x  ]  Resolved [   ]    Goal: Pt will consume >/= 75% of estimated nutrient needs via tolerated route     Nutrition Recommendations:   1. Continue with current tube feed regimen to meet 100% estimated nutrient needs:  **Maribeth Sauceda Renal Support 1.8: at goal of 60mL/hour x 18 hours, providing 1080mL total volume, 1944 calories, 86g protein, ~713mL free water; this meets ~24 calories/kg ideal body weight, ~1.1g protein/kg ideal body weight**  **Provide Banatrol Tube Feeding prn**  **Maintain aspiration precautions at all times; monitor for signs/symptoms of intolerance**  2. Monitor weights (weekly), GI function, skin integrity; electrolytes, BMP, glucose, CBC per MD discretion *renal indices*  3. Pain and bowel regimen per medical team  4. Align nutrition with goals of care at all times  5. Recommend nursing to obtain new weights to track/trend changes    Education: deferred at this time due to pt's cognitive status.     Risk Level: High [   ] Moderate [ x ] Low [   ]. Admitting Diagnosis:   Patient is a 44y old  Male who presents with a chief complaint of brain stem bleed (03 Jan 2025 00:00)  PAST MEDICAL & SURGICAL HISTORY:  Acute myocardial infarction  CVA (cerebral vascular accident)      Current Nutrition Order:   Diet, NPO with Tube Feed:   Tube Feeding Modality: Gastrostomy  [employer] Renal Support 1.8  Total Volume for 24 Hours (mL): 1080  Total Number of Cans: 5  Continuous  Starting Tube Feed Rate {mL per Hour}: 30  Increase Tube Feed Rate by (mL): 10     Every 4 hours  Until Goal Tube Feed Rate (mL per Hour): 60  Tube Feed Duration (in Hours): 18  Tube Feed Start Time: 10:00  Tube Feed Stop Time: 04:00  Banatrol TF     Qty per Day:  2 (12-18-24 @ 09:33) [Active]     PO Intake: N/A... remains NPO with tube feeds as primary source of nutrition (tube feeds running at goal rate this afternoon upon RD visit)     GI Issues: fecal incontinence; last BM noted on 1/2/25; no noted diarrhea    Pain: Absence of non-verbal indicators of pain    Skin Integrity: no pressure ulcers, generalized, trace edema; bruised (ecchymosis); PEG present; Mookie: 12      01-02-25 @ 07:01 - 01-03-25 @ 07:00  --------------------------------------------------------  IN: 1680 mL / OUT: 1425 mL / NET: 255 mL    01-03-25 @ 07:01 - 01-03-25 @ 11:49  --------------------------------------------------------  IN: 0 mL / OUT: 150 mL / NET: -150 mL        Labs:   01-03    136  |  103  |  52[H]  ----------------------------<  103[H]  3.9   |  23  |  1.42[H]    Ca    9.6      03 Jan 2025 05:30  Phos  3.7     01-03  Mg     2.3     01-03    CAPILLARY BLOOD GLUCOSE    Medications:  MEDICATIONS  (STANDING):  acetylcysteine 10%  Inhalation 4 milliLiter(s) Inhalation every 6 hours  albuterol/ipratropium for Nebulization 3 milliLiter(s) Nebulizer every 6 hours  amLODIPine   Tablet 5 milliGRAM(s) Oral daily  artificial tears (preservative free) Ophthalmic Solution 1 Drop(s) Right EYE every 4 hours  baclofen 5 milliGRAM(s) Oral every 12 hours  chlorhexidine 0.12% Liquid 15 milliLiter(s) Oral Mucosa every 12 hours  chlorhexidine 2% Cloths 1 Application(s) Topical <User Schedule>  doxazosin 8 milliGRAM(s) Oral at bedtime  erythromycin   Ointment 1 Application(s) Right EYE at bedtime  fluticasone propionate 50 MICROgram(s)/spray Nasal Spray 1 Spray(s) Both Nostrils two times a day  heparin   Injectable 5000 Unit(s) SubCutaneous every 8 hours  levETIRAcetam 1500 milliGRAM(s) Oral two times a day  ofloxacin 0.3% Solution 1 Drop(s) Right EYE four times a day  pantoprazole  Injectable 40 milliGRAM(s) IV Push daily  petrolatum Ophthalmic Ointment 1 Application(s) Both EYES two times a day  phenytoin   Suspension 100 milliGRAM(s) Oral three times a day  propranolol 10 milliGRAM(s) Oral every 8 hours  sodium chloride 0.65% Nasal 1 Spray(s) Both Nostrils two times a day    MEDICATIONS  (PRN):  acetaminophen   Oral Liquid .. 650 milliGRAM(s) Oral every 6 hours PRN Temp greater or equal to 38.5C (101.3F), Mild Pain (1 - 3)    Dosing Anthropometrics:   Height for BMI (FEET)	5 Feet  Height for BMI (INCHES)	8 Inch(s)  Height for BMI (CM)	172.72 Centimeter(s)  Weight for BMI (lbs)	176.4 lb  Weight for BMI (kg)	80 kg  Body Mass Index	              26.8  ideal body weight:               154 pounds / 70 kg   % ideal body weight             114%     Weight Change: No new wt obtained since admit, strongly recommend nursing to obtain new wt to track/ trend changes     Estimated energy needs:  Weight used for calculations	ideal body weight  Estimated Energy Needs Weight (lbs)	154 lb  Estimated Energy Needs Weight (kg)	69.8 kg  Estimated Energy Needs From (christiana/kg)	25  Estimated Energy Needs To (christiana/kg)	30  Estimated Energy Needs Calculated From (christiana/kg)	1745  Estimated Energy Needs Calculated To (christiana/kg)	2094  Weight used for calculations	ideal body weight  Estimated Protein Needs Weight (lbs)	154 lb  Estimated Protein Needs Weight (kg)	69.8 kg  Estimated Protein Needs From (g/kg)	1.2  Estimated Protein Needs To (g/kg)	1.4  Estimated Protein Needs Calculated From (g/kg)	83.76  Estimated Protein Needs Calculated To (g/kg)	97.72  Estimated Fluid Needs Weight (lbs)	154 lb  Estimated Fluid Needs Weight (kg)	69.8 kg  Estimated Fluid Needs From (ml/kg)	25  Estimated Fluid Needs To (ml/kg)	30  Estimated Fluid Needs Calculated From (ml/kg)	1745  Estimated Fluid Needs Calculated To (ml/kg)	2094  Other Calculations	Pt is >100% ideal body weight, thus ideal body weight used for all calculations. Needs adjusted for age, clinical course.     Subjective: This is a 44 year old male, unknown past medical history (reported stroke and MI by coworkers) presented to [facility] with AMS, Pt was working at [employer] when he was found down by coworkers. EMS called and pt brought to [facility] ED. Intubated, sedated, started on cardene for SBPs in 200s. CT head showed brain stem bleed. Transferred to NSICU for further management.    Patient seen at bedside on 8 lachman for nutrition follow up. Current diet order: NPO with tube feeds of [employer] Renal Support back at goal rate of 60 cc/hr x18 hrs (providing ~ 1944 kcal, 86g protein, 713 mL free water), meeting 100% estimated nutrient needs. GI within normal limits at this time as per flowsheets, RN reports bowel movement 1/2/25 but denies diarrhea at this time. Labs: BUN elevated (52), Creatinine elevated (1.42), medical team is aware, RD to monitor trends. Medications reviewed as above. RD will continue to follow, see nutrition recommendations below.     Previous Nutrition Diagnosis: Inadequate Oral Intake related to inability to meet nutritional demands via PO as evidenced by NPO with tube feedings    Active [ x  ]  Resolved [   ]    Goal: Pt will consume >/= 75% of estimated nutrient needs via tolerated route     Nutrition Recommendations:   1. Continue with current tube feed regimen to meet 100% estimated nutrient needs:  **Maribeth Sauceda Renal Support 1.8: at goal of 60mL/hour x 18 hours, providing 1080mL total volume, 1944 calories, 86g protein, ~713mL free water; this meets ~24 calories/kg ideal body weight, ~1.1g protein/kg ideal body weight**  **Provide Banatrol Tube Feeding prn**  **Maintain aspiration precautions at all times; monitor for signs/symptoms of intolerance**  2. Monitor weights (weekly), GI function, skin integrity; electrolytes, BMP, glucose, CBC per MD discretion *renal indices*  3. Pain and bowel regimen per medical team  4. Align nutrition with goals of care at all times  5. Recommend nursing to obtain new weights to track/trend changes    Education: deferred at this time due to pt's cognitive status.     Risk Level: High [   ] Moderate [ x ] Low [   ].

## 2025-01-04 PROCEDURE — 99233 SBSQ HOSP IP/OBS HIGH 50: CPT

## 2025-01-04 PROCEDURE — 99232 SBSQ HOSP IP/OBS MODERATE 35: CPT

## 2025-01-04 RX ADMIN — Medication 15 MILLILITER(S): at 05:02

## 2025-01-04 RX ADMIN — Medication 100 MILLIGRAM(S): at 21:28

## 2025-01-04 RX ADMIN — ACETYLCYSTEINE 4 MILLILITER(S): 200 INHALANT RESPIRATORY (INHALATION) at 05:01

## 2025-01-04 RX ADMIN — Medication 1 DROP(S): at 17:31

## 2025-01-04 RX ADMIN — OFLOXACIN 1 DROP(S): 3 SOLUTION OPHTHALMIC at 17:32

## 2025-01-04 RX ADMIN — HEPARIN SODIUM 5000 UNIT(S): 1000 INJECTION INTRAVENOUS; SUBCUTANEOUS at 05:01

## 2025-01-04 RX ADMIN — OFLOXACIN 1 DROP(S): 3 SOLUTION OPHTHALMIC at 05:03

## 2025-01-04 RX ADMIN — Medication 1 APPLICATION(S): at 17:45

## 2025-01-04 RX ADMIN — Medication 1 DROP(S): at 10:03

## 2025-01-04 RX ADMIN — Medication 1 APPLICATION(S): at 05:03

## 2025-01-04 RX ADMIN — Medication 15 MILLILITER(S): at 17:31

## 2025-01-04 RX ADMIN — Medication 1 SPRAY(S): at 05:03

## 2025-01-04 RX ADMIN — Medication 1 DROP(S): at 01:58

## 2025-01-04 RX ADMIN — Medication 1 SPRAY(S): at 17:32

## 2025-01-04 RX ADMIN — IPRATROPIUM BROMIDE AND ALBUTEROL SULFATE 3 MILLILITER(S): .5; 2.5 SOLUTION RESPIRATORY (INHALATION) at 17:32

## 2025-01-04 RX ADMIN — ACETYLCYSTEINE 4 MILLILITER(S): 200 INHALANT RESPIRATORY (INHALATION) at 23:38

## 2025-01-04 RX ADMIN — FLUTICASONE PROPIONATE 1 SPRAY(S): 50 SPRAY, METERED NASAL at 17:33

## 2025-01-04 RX ADMIN — IPRATROPIUM BROMIDE AND ALBUTEROL SULFATE 3 MILLILITER(S): .5; 2.5 SOLUTION RESPIRATORY (INHALATION) at 05:02

## 2025-01-04 RX ADMIN — BACLOFEN 5 MILLIGRAM(S): 10 INJECTION INTRATHECAL at 05:01

## 2025-01-04 RX ADMIN — Medication 1 DROP(S): at 05:03

## 2025-01-04 RX ADMIN — BACLOFEN 5 MILLIGRAM(S): 10 INJECTION INTRATHECAL at 17:32

## 2025-01-04 RX ADMIN — Medication 1 DROP(S): at 21:29

## 2025-01-04 RX ADMIN — Medication 100 MILLIGRAM(S): at 11:48

## 2025-01-04 RX ADMIN — Medication 1 APPLICATION(S): at 05:02

## 2025-01-04 RX ADMIN — ERYTHROMYCIN 1 APPLICATION(S): 5 OINTMENT OPHTHALMIC at 21:28

## 2025-01-04 RX ADMIN — HEPARIN SODIUM 5000 UNIT(S): 1000 INJECTION INTRAVENOUS; SUBCUTANEOUS at 13:36

## 2025-01-04 RX ADMIN — Medication 40 MILLIGRAM(S): at 11:48

## 2025-01-04 RX ADMIN — Medication 1 DROP(S): at 13:36

## 2025-01-04 RX ADMIN — FLUTICASONE PROPIONATE 1 SPRAY(S): 50 SPRAY, METERED NASAL at 05:04

## 2025-01-04 RX ADMIN — OFLOXACIN 1 DROP(S): 3 SOLUTION OPHTHALMIC at 23:39

## 2025-01-04 RX ADMIN — AMLODIPINE BESYLATE 5 MILLIGRAM(S): 10 TABLET ORAL at 05:02

## 2025-01-04 RX ADMIN — IPRATROPIUM BROMIDE AND ALBUTEROL SULFATE 3 MILLILITER(S): .5; 2.5 SOLUTION RESPIRATORY (INHALATION) at 23:38

## 2025-01-04 RX ADMIN — LEVETIRACETAM 1500 MILLIGRAM(S): 10 INJECTION, SOLUTION INTRAVENOUS at 05:01

## 2025-01-04 RX ADMIN — ACETYLCYSTEINE 4 MILLILITER(S): 200 INHALANT RESPIRATORY (INHALATION) at 17:31

## 2025-01-04 RX ADMIN — HEPARIN SODIUM 5000 UNIT(S): 1000 INJECTION INTRAVENOUS; SUBCUTANEOUS at 21:27

## 2025-01-04 RX ADMIN — OFLOXACIN 1 DROP(S): 3 SOLUTION OPHTHALMIC at 11:48

## 2025-01-04 RX ADMIN — Medication 100 MILLIGRAM(S): at 05:17

## 2025-01-04 RX ADMIN — ACETYLCYSTEINE 4 MILLILITER(S): 200 INHALANT RESPIRATORY (INHALATION) at 11:47

## 2025-01-04 RX ADMIN — LEVETIRACETAM 1500 MILLIGRAM(S): 10 INJECTION, SOLUTION INTRAVENOUS at 17:32

## 2025-01-04 RX ADMIN — DOXAZOSIN MESYLATE 8 MILLIGRAM(S): 8 TABLET ORAL at 21:28

## 2025-01-04 RX ADMIN — IPRATROPIUM BROMIDE AND ALBUTEROL SULFATE 3 MILLILITER(S): .5; 2.5 SOLUTION RESPIRATORY (INHALATION) at 11:47

## 2025-01-04 NOTE — PROVIDER CONTACT NOTE (OTHER) - ASSESSMENT
B/L eyelid and lip twitching, concerning for seizure-like activity. No other seizure-like activity assessed.

## 2025-01-04 NOTE — PROGRESS NOTE ADULT - SUBJECTIVE AND OBJECTIVE BOX
Patient was seen and examined at bedside. Case discuss with the primary team. Pt w/o any events overnight.     OBJECTIVE:  Vital Signs Last 24 Hrs  T(C): 36.4 (04 Jan 2025 09:13), Max: 37.1 (03 Jan 2025 16:46)  T(F): 97.6 (04 Jan 2025 09:13), Max: 98.8 (03 Jan 2025 16:46)  HR: 62 (04 Jan 2025 08:08) (60 - 80)  BP: 103/73 (04 Jan 2025 08:08) (96/67 - 121/89)  BP(mean): 84 (04 Jan 2025 08:08) (77 - 101)  RR: 17 (04 Jan 2025 08:08) (14 - 19)  SpO2: 100% (04 Jan 2025 08:08) (94% - 100%)    Parameters below as of 04 Jan 2025 08:08  Patient On (Oxygen Delivery Method): ventilator    O2 Concentration (%): 40    PHYSICAL EXAM:  General: comfortable-appearing, no accessory muscle use on trach collar  HEENT: tracheostomy   Lungs: no crackles, no wheezes  Heart: regular  Abdomen: soft, no visible tenderness, + BS, +PEG + gabriel in place   Extremities: warm, no edema, not moving to command      LABS:                        10.3   10.06 )-----------( 255      ( 03 Jan 2025 05:30 )             31.4       136  |  103  |  52[H]  ----------------------------<  103[H]  3.9   |  23  |  1.42[H]    Ca    9.6      03 Jan 2025 05:30  Phos  3.7     01-03  Mg     2.3     01-03    acetaminophen   Oral Liquid .. 650 milliGRAM(s) Oral every 6 hours PRN  acetylcysteine 10%  Inhalation 4 milliLiter(s) Inhalation every 6 hours  albuterol/ipratropium for Nebulization 3 milliLiter(s) Nebulizer every 6 hours  amLODIPine   Tablet 5 milliGRAM(s) Oral daily  artificial tears (preservative free) Ophthalmic Solution 1 Drop(s) Right EYE every 4 hours  baclofen 5 milliGRAM(s) Oral every 12 hours  chlorhexidine 0.12% Liquid 15 milliLiter(s) Oral Mucosa every 12 hours  chlorhexidine 2% Cloths 1 Application(s) Topical <User Schedule>  doxazosin 8 milliGRAM(s) Oral at bedtime  erythromycin   Ointment 1 Application(s) Right EYE at bedtime  fluticasone propionate 50 MICROgram(s)/spray Nasal Spray 1 Spray(s) Both Nostrils two times a day  heparin   Injectable 5000 Unit(s) SubCutaneous every 8 hours  levETIRAcetam 1500 milliGRAM(s) Oral two times a day  ofloxacin 0.3% Solution 1 Drop(s) Right EYE four times a day  pantoprazole  Injectable 40 milliGRAM(s) IV Push daily  petrolatum Ophthalmic Ointment 1 Application(s) Both EYES two times a day  phenytoin   Suspension 100 milliGRAM(s) Oral every 8 hours  propranolol 10 milliGRAM(s) Oral every 8 hours  sodium chloride 0.65% Nasal 1 Spray(s) Both Nostrils two times a day

## 2025-01-04 NOTE — PROGRESS NOTE ADULT - SUBJECTIVE AND OBJECTIVE BOX
HPI:  Unknown age male, unknown past medical history (reported stroke and MI by coworkers) presented to [facility] with AMS, Pt was working at [employer] when he was found down by coworkers. EMS called and pt brought to [facility] ED. Intubated, sedated, started on cardene for SBPs in 200s. CT head showed brain stem bleed. Transferred to NSICU for further management.  (30 Sep 2024 12:55)    OVERNIGHT EVENTS: GEORGE, neuro stable.     HOSPITAL COURSE:  9/30: transferred from [facility]. A line placed. Versed dc'd. Cy Rader at bedside, states pt has family in [place], cannot confirm medications or PMH other than stroke and MI. 250cc bolus 3% given. LR switched to NS. hydralazine 25q8 started, 3% started, switched propofol to precedex   10/1: stability CTH done. Added labetalol, started TF. Palliative consulted. ethics consulted to determine surrogate. febrile 103, pan cx sent  10/2: BD 2, GEORGE overnight. TF resumed. Desatt'd to 80s, FiO2 inc. to 50. Fentanyl given, ABG, CXR ordered. Maxxed on precedex, started on propofol for DARIEN -4 - -5. Precedex dc'd. Duonebs, mucomyst, hypertonic added. 3% dc'd. Cardene dc'd. Start vanc/CTX. Increased labetalol 200q8. MRSA negative, dc'd vanc. ETT pulled back 2cm x 2, good positioning after confirmatory chest xray. Ethics attempting to establish HCP with family. Na 159, starting FW 250q6 for range 150-155.   10/3: BD3, GEORGE o/n, neuro stable. Na elevating, FW increased to 300q6. Dc'd bowel reg for diarrhea. vEEG started. SQH 5000q8 tonight.   10/4: BD 4, albumn bolus, incr. LR to 80 2/2 incr. in Cr, LR to 10 0cc/hr for uptrending Cr. Started 7% hypersal for 48hrs and SL atropine for inline/oral thick secretions. Dc'd CTX and started ancef for MSSA in the sputum. Nephrology consulted for CKD, f/u recs. SBP 170s, given hydralazine 10mg IVP.   10/5: BD5, o/n 10mg IVP hydralazine given for SBP 170s and started on hydralazine 25q8 via OGT. 10mg IV push labetalol for SBP > 160s. RT placed for diarrhea.   10/6: BD6, o/n FW increased to 350q4 per nephrology recs. IV tylenol for temp 100.6, SBp 160s presumed uncomfortable.   10/7: BD7, overnight pancultured for temp 101.8F.   10/8: BD8. GEORGE. Cr bumped. decreased LR to 75cc/hr. Adding simethicone ATC. incr hydralazine 50mgTID. Incr labetalol 300mgTID. Na 145, decreased FWF to 250q6. Start precedex. FENa consistent with intrinsic kidney injury. Pend repeat renal US. Retaining up to 1.3L, bladder scans q6, straight cath PRN  10/9: BD 9. GEORGE overnight. Neuro stable. abd xray for distention w non-specific gas pattern, OGT to LIWS for morning. duonebs/mucomyst to q8 for improving secretions. Changed tube feeds to Jevity 1.5 20cc/hr, low rate due to abdominal distention, nepro dense and more difficult to digest. Tolerating CPAP, confirmed by ABG.   10/10: BD 10. GEORGE overnight. Neuro stable. (+) gabriel for urinary retention on bladder scan. inc TF to goal rate of 40cc/hr. family leaning toward pursuing trach/PEG. 1/2 amp for FS 81.   10/11: BD 11. GEORGE overnight. Neuro stable. Trach/PEG consults placed.   10/12: BD 12. GEORGE overnight. Neuro stable. MRI brain complete.   10/13: BD 13. Increase flomax. Hold SQH after PM dose for trach tm. IVL.   10/14: BD 14. GEORGE overnight, remains on AC/VC. Gabriel placed for urinary retention. Dc'd free water.  S/p trach with pulm. NGT placed and CXR confirmed in good position.   10/15: BD 15, GEORGE ovn. resumed feeds. spiked 101, pan cx sent.   10/16: BD 16. GEORGE ovn. Lokelma 5mg for K+ 5.4. Started vanc q 24/zosyn for empiric PNA coverage, IVF to 100/hr. PEG held for fever.   10/17: BD 17,  ordered serum osm and urine osm for am. Started sinemet for neurostimulation. Increased cardura to 0.8. Started FW 100q4, dc'd IVF. MRSA negative, dc'd vanc. NGT replaced d/t coiling.   10/18: BD 18, GEORGE overnight, neuro stable. Amantadine added for neurostim. zosyn changed to unasyn for acinetobacter baumannii, failed TOV and required SC  10/19: BD 19, GEORGE ovn. cardura 2mg added for retention. labetalol decreased 200q8, hydralazine decreased 25q8. Gabriel replaced.   10/20: BD20, GEORGE overnight. NGT dislodged, replaced. PEG tomorrow w/ gen surg, FW increased to 150q4 and labetalol decreased to 100q8, lokelma given for hyperkalemia.   10/21: BD 21. POD0 PEG placement with Gen surg. decr labetolol to 50q8, incr. cardura to 0.4, started lokelma and phoslo, dc gabriel POD0 PEG placement with Gen surg.  10/22: BD 22. Plan to start TF today via PEG. dc labetalol, Following ophtho recs. Increased apnea settings - found to be in cheyne-moe respiration. CPAP 5/5.  10/23: BD 23. hydralazine d/c'd, trach collar trial today. Rectal tube placed at 6am.  10/24: BD24, o/n lokelma held due to diarrhea. Free water 100q6 resumed. dc'd tamsulosin, amantadine. Incr'd cardura to 8mg qhs. Dc'd FW. Switched jevity to nepro. gabriel placed for high urine output. Started SL atropine for oral secretions. Dc'd free water.  10/25: BD25, o/n decreased suctioning requirements to > q4hrs, GEORGE. Cr improving, cont phoslo, lokelma held at this time. Gabriel placed yest, cont. Tolerating trach collar. Given 500cc plasmalyte bolus for ANIKA. Dc'd sinemet.   10/26: BD26, o/n resumed lokelma 5mg daily and resumed 100cc free water q6hrs. Change in neuro status with new right pupillary dilation with anisocoria (right pupil 6mm fixed and left pupil 3mm briskly reactive). Given 23.4% NaCl bullet, taken for emergent CTH showing mostly resolved pontine hemorrhage, continued brainstem hypodensity likely edema d/t hemorrhage, no new hemorrhage or infarct, no herniation, mild increase in size of left lateral ventricle. Vitals remaine stable. Na goal > 140.   10/27: BD27, o/n GEORGE.Neuro stable. Pend stepdown with airway bed.   10/28: BD 28. GEORGE overnight. Neuro stable. Miralax ordered. Gabriel removed, pending TOV.  10/29: BD 29. GEORGE o/n. Given 2L NS over 8 hrs for increased BUN/Cr ratio. Gabriel placed for frequent straight cath.   10/30: BD 30.   10/31: BD 31. GEORGE overnight. Na 149, increased free water to 200q6. 1L NS for uptrending BUN.   11/1: BD 32. GEORGE overnight. Given 1L NS for dehydration. Na 146, increased FW to 250q6.   11/2: BD 33 GEORGE overnight, neuro stable, given 500cc bolus for net negative status and tachycardia   11/3: BD 34, GEORGE overnight, neuro stable. Patient remains tachycardic, EKG showing sinus tachycardia, given additional 500cc NS bolus. Febrile to 101.9F, pan cultured (without UA), CXR WNL, given tylenol.   11/4: BD 35, GEORGE overnight, neuro stable. Given 1L NS for tachycardia. sputum (+) for stenotropohomas maltophilia.   11/5: BD 36 GEORGE overnight, neuro stable. Vancomycin dc'd. Chest PT BID. ID consulted, cont zosyn.  11/6: BD 37. blood cx + klebsiella dc zosyn changed to cefepime, CTAP ordered, rpt blood cx sent.    11/7: BD 38. Pending CT A/P, given 250cc bolus and starting maintenance fluids overnight. Pending CT A/P after bolus   11/8: BD 39. CT CAP negative for infection.   11/9: BD 40. GEORGE overnight.  11/10: BD 41. GEORGE overnight. desat to 85 on trach collar, O2 inc to 10L and 100%, O2 sat inc to 95. pt tachy to 110s, euvolemic. given tylenol. ABG and CXR ordered. spiked fever, pancultured, RVP negative. AM ABG w pO2 79, rpt w pO2 79. pt appears comfortable, satting 94%.   11/11: BD 42. GEORGE overnight. pt became tachy to 130s, desat to 90 on 100% FiO2 and 10L. suctioned, (+) productive cough. temp 101.4, given 1g IV tylenol and 500cc NS bolus for euvolemia. fever and HR downtrending. LE dopplers negative for dvt  11/12: BD 43, GEORGE ovn, fever and HR downtrending, satting 97% 70% FIO2  11/13: BD 44, GEORGE ovn. started standing tylenol x24 hours for tachycardia. desat to 80s, o2 increased. CXR stable, pending CTA PE protocol.   11/14: BD 45, GEORGE overnight, neuro stable. resp therapy dec FiO2 to 70%.   11/15: BD 46, GEORGE overnight, neuro stable.  Rapid called for desaturation 30s, tachycardic 140s. Patient bagged, 100% fio2, heavily suctioned. CXR/POCUS unremarkable. ABG c/w desaturation. WBC 14.71. Afebrile. O2 improved to 90s and patient upgraded to ICU. ABG paO2 30s improved to 89 on vent. IV Tylenol x 1, sputum sent. Start protonix while o-n vent.   11/16: BD 47. POCUS showed collapsable IVCF, given 1L bolus. Vanco/Cefpime added empriic for PNA, NGT feeds restarted. MRSA swab neg, Vanc DC'd.   11/17: BD 48. GEORGE overnight. 1l bolus for tachycardia. Spiked to 101, cultured. 500cc bolus for tachycardia, tachy to 148 given 25mcg fentanyl, 250cc albumin, 1.5L bolus. 5 IV lopressor with response HR to 100s. +Stenotrophomonas on sputum cx.   11/18: BD 49. GEORGE overnight. Consulted ID, cefepime switched to bactrim x7days. Started hydrochlorothiazine 12.5mg daily.  11/19: BD 50. Tachy 120s, given tylenol and 500cc NS. Tolerating 5/5, switched to TCx. Holding phos binder. D/c Bactrim. D/c gabriel, f/u TOV. Dc'd PPI.   11/20: BD 51. GEORGE ovn. 1600 satting low 90s, mildly tachy to 110s, afebrile, RR wnl. O2 improved to mid 90s while inc O2 to 100% on TCx. CPAP 5/5 placed back on.  11/21: BD 52, GEORGE ovn, tolerating CPAP 5/5. Switch to trach collar during the day if tolerating well. HCTZ held for Cr bump, straight cath frequence increased to q4  11/22: BD 53, GEORGE ovn. Resumed phoslo. Gabriel placed. Resumed HCTZ.   11/23: BD 54. Holding tylenol in setting of possible fever, will require pan cx if febrile. Cr improved today. Cont CPAP. Bowel regimen held i/s/o diarrhea. FOBT negative.  11/24: BD 55. GEORGE overnight. Neuro stable. HCTZ dc'ed, started lisinopril 5. Lokelma dc'ed for K 3.7.   11/25: BD 56, GEORGE overnight. Neuro stable. dc'd lisinopril 5mg. Gabriel dc'd. TOV. 1545 noted to be hypotensive, MAP 50, in supine position on chair, HR 60s, afebrile, O2 96%. Given 1L cc NS bolus, placed back on bed in reverse trendelenberg, improved to map of 66. Neostick at bedside. Vitals check q1h. Dc'ed amlodipine. Failed TOV, bladder scan q6, sc prn. Added back Senna.   11/26: BD 57, GEORGE overnight. Neuro stable. Dc'd phoslo.   11/27: BD 58, GEORGE overnight. Neuro stable.    11/28: BD 59. Gabriel replaced.   11/29: GEORGE.  11/30: GEORGE, neuro stable.   12/1: BD 62, GEORGE overnight  12/2: BD 63, GEORGE overnight.; Given 1L bolus of LR for uptrending BUN/Cr.  12/3: BD 64. Reinstated eye gtt/moisture chamber given increased Rt eye injection  12/4: BD 65. GEORGE overnight. Attempted to speak with ophtho regarding eyelid weight/closure but no answer, full mailbox.   12/5: BD 66, GEORGE overnight, bowel regimen increased and had BM.   12/6: BD 67, GEORGE overnight, neuro stable.  12/7: GEORGE overnight, neuro stable. /110s, given x1 hydralazine 10 mg IVP. Restarted home amlodipine 5mg.  12/8: GEORGE. OOB to chair.     12/11: GEORGE, mucomyst added for thick secretions, simethicone for abd distension, abd xray with stool burden, increased bowel regimen.   12/12: GEORGE overnight.   12/13: GEORGE overnight.   12/14: GEORGE overnight  12/15: o/n Patient became tachycardic HR 120s and 10 minutes later O2 sat dropped as low as 89%. Patient suctioned without improvement in O2 sat and tube feeds found in suction catheter. TFs held.  STAT CXR ordered. STAT labs sent. Respiratory therapist called to bedside and patient trach connected to ventilator. After connection to ventilator and further suctioning O2 sat improved to 97% but patient HR remains 120-130s. Upgraded to NSICU for further management. Vancomycin and zosyn started. CTA  chest PE protocol and CTH ordered. Blood cultures sent.given 500cc bolus, rpt ABG sent pO2 243, CTH and CTA chest done. FS while NPO, FiO2 dec 50 pending ABG. sputum cx positive for few GPC and GNR.   12/16: GEORGE overnight, restarted amlodipine, troponin 75   12/17: GEORGE overnight, neuro stable. Attempted CPAP this morning, did not tolerate and back on full vent support. Dc'd Vanc. Tachycardia to 140s, noted extremities to be twitching along with jaw twitching. Given 2g of Keppra, total 4mg ativan and placed on EEG, full set of labs and lactate negative. Resumed trickle feeds at 20cc/hr. Given 250cc albumin for tachycardia.   12/18: GEORGE overnight. Neuro stable. CTH stable. EEG negative and dc'd.   12/19: GEORGE overnight. neuro stable. SIMV most of day, AC/VC at night.   12/20: GEORGE overnight, neuro stable. remains on VC/AC  12/21: GEORGE ovn. 1L bolus for tachy to 120s-130s.   12/22: GEORGE ovn. Tachy to 120s, given tylenol and IVF. 2mg IV ativan given for L jaw twitching. Sx resolved for 3 minutes and started again. Epilepsy contacted. Given 2mg IV ativan. Continued twitching. Increased keppra to 1500mg BID, 2mg ativan given. Propranolol started for refractory tachycardia. vEEG ordered. albumin given. POCUS performed, no b lines. Started on fosphenytoin, loaded w 20mg/kg then 100mg TID. febrile, pancx, started cefepime 2g q8, vancomycin  12/23: GEORGE ovn. +focal motor seizures on eeg. ID consulted. Vancomycin dc'ed as per ID.  12/24: GEORGE ovn, neuro stable. Baclofen 5 mg q12 started for hiccups. Na 129 from 134. Urine lytes c/w SIADH. Given 250cc 3% bolus. CT chest for infection w/u - f/u read. Repeat Na 136. UA negative  12/25: GEORGE ovn.   12/26: GEORGE ovn  12/27: GEORGE overnight. Blood cx neg @ 4 days, DC cefepime per medicine (sputum colonized).   12/28: Desaturation o/n to 80's, CXR obtained, pulse ox changed and sats resolved. Na 133 from 138, 250cc 3% bolus given. Cefepime resumed until 12/30 per ID. Rpt Na 138.  12/29: GEORGE overnight. Na stable.   12/30: GEORGE overnight. Family meeting with son, pt now DNR.   12/31: GEORGE overnight.   1/1: GEORGE overnight, neuro stable.  1/2: GEOREG overnight, neuro stable. FW decreased to 100q6.   1/3: GEORGE overnight, neuro stable. fosphenytoin IV changed to pheytoin PO via PEG per epilepsy recommendations  1/4: GEORGE overnight, neuro stable.       Vital Signs Last 24 Hrs  T(C): 36.9 (03 Jan 2025 22:34), Max: 37.1 (03 Jan 2025 16:46)  T(F): 98.4 (03 Jan 2025 22:34), Max: 98.8 (03 Jan 2025 16:46)  HR: 76 (03 Jan 2025 23:50) (64 - 80)  BP: 115/71 (03 Jan 2025 23:50) (105/73 - 116/79)  BP(mean): 88 (03 Jan 2025 23:50) (85 - 93)  RR: 15 (03 Jan 2025 23:50) (14 - 19)  SpO2: 97% (03 Jan 2025 23:50) (94% - 98%)    Parameters below as of 03 Jan 2025 23:50  Patient On (Oxygen Delivery Method): ventilator    O2 Concentration (%): 40    I&O's Summary    02 Jan 2025 07:01  -  03 Jan 2025 07:00  --------------------------------------------------------  IN: 1680 mL / OUT: 1425 mL / NET: 255 mL    03 Jan 2025 07:01  -  04 Jan 2025 00:36  --------------------------------------------------------  IN: 1260 mL / OUT: 955 mL / NET: 305 mL        PHYSICAL EXAM:  Constitutional: Lying in bed, in NAD  Respiratory: +trach to vent, breathing non-labored, symmetrical chest wall movement  Cardiovascular: RRR, no murmurs  Gastrointestinal: +PEG, abdomen soft, non tender  Neurological:  AAOX2 with choices, opens eyes spontaneously, eyes track vertically.  Cranial Nerves: R pupil 3 mm brisk, L pupil 4 mm brisk, face symmetric.   Motor: RUE HG 1/5, LUE trace WD, RLE TF, LLE WD.   Sensation: unable to assess   Wounds: none       TUBES/LINES:  [x] Gabriel  [] Wound Drains  [] Others      DIET:  [] NPO  [] Mechanical  [x] Tube feeds    LABS:                        10.3   10.06 )-----------( 255      ( 03 Jan 2025 05:30 )             31.4     01-03    136  |  103  |  52[H]  ----------------------------<  103[H]  3.9   |  23  |  1.42[H]    Ca    9.6      03 Jan 2025 05:30  Phos  3.7     01-03  Mg     2.3     01-03        Urinalysis Basic - ( 03 Jan 2025 05:30 )    Color: x / Appearance: x / SG: x / pH: x  Gluc: 103 mg/dL / Ketone: x  / Bili: x / Urobili: x   Blood: x / Protein: x / Nitrite: x   Leuk Esterase: x / RBC: x / WBC x   Sq Epi: x / Non Sq Epi: x / Bacteria: x        CAPILLARY BLOOD GLUCOSE        Drug Levels: [] N/A    CSF Analysis: [] N/A      Allergies    Allergy Status Unknown    Intolerances      MEDICATIONS:  Antibiotics:    Neuro:  acetaminophen   Oral Liquid .. 650 milliGRAM(s) Oral every 6 hours PRN  baclofen 5 milliGRAM(s) Oral every 12 hours  levETIRAcetam 1500 milliGRAM(s) Oral two times a day  phenytoin   Suspension 100 milliGRAM(s) Oral every 8 hours    Anticoagulation:  heparin   Injectable 5000 Unit(s) SubCutaneous every 8 hours    OTHER:  acetylcysteine 10%  Inhalation 4 milliLiter(s) Inhalation every 6 hours  albuterol/ipratropium for Nebulization 3 milliLiter(s) Nebulizer every 6 hours  amLODIPine   Tablet 5 milliGRAM(s) Oral daily  artificial tears (preservative free) Ophthalmic Solution 1 Drop(s) Right EYE every 4 hours  chlorhexidine 0.12% Liquid 15 milliLiter(s) Oral Mucosa every 12 hours  chlorhexidine 2% Cloths 1 Application(s) Topical <User Schedule>  doxazosin 8 milliGRAM(s) Oral at bedtime  erythromycin   Ointment 1 Application(s) Right EYE at bedtime  fluticasone propionate 50 MICROgram(s)/spray Nasal Spray 1 Spray(s) Both Nostrils two times a day  ofloxacin 0.3% Solution 1 Drop(s) Right EYE four times a day  pantoprazole  Injectable 40 milliGRAM(s) IV Push daily  petrolatum Ophthalmic Ointment 1 Application(s) Both EYES two times a day  propranolol 10 milliGRAM(s) Oral every 8 hours  sodium chloride 0.65% Nasal 1 Spray(s) Both Nostrils two times a day    IVF:    CULTURES:  Culture Results:   Numerous Klebsiella aerogenes (Previously Enterobacter)  Numerous Stenotrophomonas maltophilia  Commensal iram consistent with body site (12-25 @ 20:05)  Culture Results:   No growth at 5 days (12-22 @ 17:45)    RADIOLOGY & ADDITIONAL TESTS:      ASSESSMENT:   44M PMH ?stroke/MI present to [facility] after collapsing at work. Decorticate posturing, vomiting, intubated for airway protection. 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 Transferred to [facility] for further management. s/p trach 10/14. s/p peg 10/21. Re-upgrade to ICU 2/2 desaturation event and suctioning requirements 11/15. Re-upgrade to NSICU 12/15 2/2 desaturation and tachycardia.    Neuro:  - neuro/vitals q4h  - pain control: tylenol prn  - seizure tx: keppra 1500mg BID, phenytoin 100mg PO TID  - vEEG (10/3-4) negative, (10/17-10/19) negative, (12/17-12/18) negative, (12/22-12/25 ) +focal motor seizures not correlating w/ EEG  - epilepsy following  - CTH 9/30: enlarged pontine hemorrhage, CTH 10/3: stable, CTH 10/25: mostly resolved pontine hemorrhage, CTH 12/15: R mastoid air cell opacification; acute otitis media vs sterile effusion, CTH 12/18: stable.  - MRI brain 10/12: parenchymal hemorrhage, acute/subacute R cerebellar stroke    - stroke neuro signed off    CV:  - -160  - tachycardia: propranolol 10mg q8  - HTN: amlodipine 5mg  - echo (9/30) EF 75%, repeat 12/16: 57%    Resp:  - trach to vent, AC/VC 40/400/14/6  - CTA PE protocol 12/15 negative   - Secretions: duonebs/mucomyst/chest PT q6h   - CT Chest 12/24 for infectious w/u: b/l LL atelectasis and opacities     GI:  - TF via PEG (placed 10/21 by gen surg)  - bowel regimen held for loose stool, last BM 1/2  - baclofen 5q12 for hiccups     Renal:  - IVL, FW 100q6 for hydration   - Urinary retenion: Gabriel replaced 12/15  - CKD: trend BUN/Cr  - renal US 10/1: echogenicity c/w chronic med renal dz, repeat 10/8: inc renal echogeicity, c/f medical renal disease w increased hydro     Endo:  - A1c 5.4    Heme:  - DVT ppx: SCDs, SQH 5000u q8h   - LE dopplers negative 12/16    ID:  - last pan cx 12/22  - empiric PNA: cefepime (12/22-12/30), s/p vanc (12/22-12/23), s/p zosyn (12/15-12/20), s/p vanc (12/15-12/16)  - s/p Stenotrophomonas maltophilia PNA: s/p Bactrim (11/18-11/19) s/p Cefepime (11/16-11/18)  - 11/3, (+) sputum for stenotrophomas maltophlia, blood cx (+) klebsiella, cefepime 2gq12 (11/6 - 11/12)   - empiric tx: s/p zosyn (11/3-11/6) , s/p vanc  (11/3-11/5)  - S/p Ancef (10/4-10/14) for PNA, and s/p Unasyn (10/18-10/23) +actinobacter baumanii     MISC:  - ophtho consult for keratitis  - erythromycin ointment R eye q4hrs, ofloxacin ointment R eye QID, artificial tears R eye q2hrs, moisture chamber at bedtime    Dispo: SDU status, DNR, pending PRUCOL for benefits     D/w Dr. D'Amico         Assessment:  Present when checked    []  GCS  E   V  M     Heart Failure: []Acute, [] acute on chronic , []chronic  Heart Failure:  [] Diastolic (HFpEF), [] Systolic (HFrEF), []Combined (HFpEF and HFrEF), [] RHF, [] Pulm HTN, [] Other    [] ANIKA, [] ATN, [] AIN, [] other  [] CKD1, [] CKD2, [] CKD 3, [] CKD 4, [] CKD 5, []ESRD    Encephalopathy: [] Metabolic, [] Hepatic, [] toxic, [] Neurological, [] Other    Abnormal Nurtitional Status: [] malnurtition (see nutrition note), [ ]underweight: BMI < 19, [] morbid obesity: BMI >40, [] Cachexia    [] Sepsis  [] hypovolemic shock,[] cardiogenic shock, [] hemorrhagic shock, [] neuogenic shock  [] Acute Respiratory Failure  []Cerebral edema, [] Brain compression/ herniation,   [] Functional quadriplegia  [] Acute blood loss anemia

## 2025-01-04 NOTE — PROGRESS NOTE ADULT - ASSESSMENT
44M with unclear PMH (?stroke, MI) who was found down at work, intubated for airway protection and found to have acute parenchymal hemorrhage within nabil with mass effect (+ acute/subacute right cerebellar infarct) in setting of hypertensive emergency, transferred to [facility] for further neurosurgical care. Hospital course c/b poor neurologic recovery s/p trach-PEG, AUR s/p gabriel, ANIKA on CKD c/b hyperkalemia, HAP s/p amp-sulbactam (EOT 10/23), K aerogenes bacteremia  treated with 2 weeks of Cefepime per ID , On 11/15 he became septic and was transferred to NSICU due to increased o2 requirements and needed Vent support , Trach Cultures + for Stenotrophomonas which was treated with 7 days of Bactrim per ID , has been weaned of Vent since 11/23 and is now on 10lts at 40% o2 through Trach Collar. Stepped up to the NSICU on 12/15 for hypoxia and tachycardia. PE ruled out. On abx for 5 days. Unclear etiology. Now stepped down to 8Lach.    # Pontine hemorrhage  # Encephalopathy due to Intracranial Bleed   - Acute parenchymal hemorrhage within nabil with mass effect (+ acute/subacute right cerebellar infarct) in setting of hypertensive emergency.   - MRI brain also demonstrated acute/subacute R cerebellar stroke.   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  -Will continue to monitor Na    #Acute kidney injury  # Acute Tubular Necrosis superimposed on CKD stage III  - Pt s/p US renal with chronic medical renal disease  -Pt with increased creatinine 1.31->1.42  -Will increase free water to 100q4h given ANIKA  -Continue to monitor creatinine and check urine lytes if pt's creatinine continues to uptrend     # Seizures  - Continue Seizure precautions   - Continue  Keppra and Fosphenytoin     # Suspected Hospital Acquired Pneumonia ( Resolved)  - Pt had a CT Chest on 12/24 with evidence of Bilateral lower lobe infiltrates   - Pt completed the abx tx course of  Cefepime on12/30.  -Pt is afebrile and no leukocytosis     # Hypertension  - Continue amlodipine 5mg daily with holding parameters  -Will continue to monitor BP and titrate  antihypertensive as required     # Chronic respiratory failure.   -Pt s/p percutaneous trach by pulm on 10/14/24  - Currently on Vent Support , No Increase in O2 requirement   - Continue Chest PT    # Neurogenic dysphagia.   - Pt s/p PEG with surgery 10/21/24  - TF per nutrition  - aspiration precautions and elevation of HOB.    #Acute urinary retention.   - doxazosin 8mg    # Functional quadriplegia in  setting of brainstem hemorrhage  - decub precautions  - care per nursing protocol     # DVT prop  - Heparin SQ q8     # Dispo:   Pending [place] , Court Appointed Guardian Jason Pierson   - Patient is currently DNR but will proceed without de-escalation at this time    55 minutes spent on total encounter. The necessity of the time spent during the encounter on this date of service was due to:    Review of hospital course, labs, vitals, medical records.  Bedside exam and interview of the patient  Discussed plan of care with primary team   Documenting the encounter.

## 2025-01-05 LAB
ANION GAP SERPL CALC-SCNC: 13 MMOL/L — SIGNIFICANT CHANGE UP (ref 5–17)
BUN SERPL-MCNC: 54 MG/DL — HIGH (ref 7–23)
CALCIUM SERPL-MCNC: 9.6 MG/DL — SIGNIFICANT CHANGE UP (ref 8.4–10.5)
CHLORIDE SERPL-SCNC: 104 MMOL/L — SIGNIFICANT CHANGE UP (ref 96–108)
CO2 SERPL-SCNC: 22 MMOL/L — SIGNIFICANT CHANGE UP (ref 22–31)
CREAT SERPL-MCNC: 1.42 MG/DL — HIGH (ref 0.5–1.3)
EGFR: 62 ML/MIN/1.73M2 — SIGNIFICANT CHANGE UP
EGFR: 62 ML/MIN/1.73M2 — SIGNIFICANT CHANGE UP
GLUCOSE SERPL-MCNC: 119 MG/DL — HIGH (ref 70–99)
HCT VFR BLD CALC: 33.5 % — LOW (ref 39–50)
HGB BLD-MCNC: 10.8 G/DL — LOW (ref 13–17)
MAGNESIUM SERPL-MCNC: 2.6 MG/DL — SIGNIFICANT CHANGE UP (ref 1.6–2.6)
MCHC RBC-ENTMCNC: 30 PG — SIGNIFICANT CHANGE UP (ref 27–34)
MCHC RBC-ENTMCNC: 32.2 G/DL — SIGNIFICANT CHANGE UP (ref 32–36)
MCV RBC AUTO: 93.1 FL — SIGNIFICANT CHANGE UP (ref 80–100)
NRBC # BLD: 0 /100 WBCS — SIGNIFICANT CHANGE UP (ref 0–0)
NRBC BLD-RTO: 0 /100 WBCS — SIGNIFICANT CHANGE UP (ref 0–0)
PHOSPHATE SERPL-MCNC: 4.2 MG/DL — SIGNIFICANT CHANGE UP (ref 2.5–4.5)
PLATELET # BLD AUTO: 288 K/UL — SIGNIFICANT CHANGE UP (ref 150–400)
POTASSIUM SERPL-MCNC: 3.5 MMOL/L — SIGNIFICANT CHANGE UP (ref 3.5–5.3)
POTASSIUM SERPL-SCNC: 3.5 MMOL/L — SIGNIFICANT CHANGE UP (ref 3.5–5.3)
RBC # BLD: 3.6 M/UL — LOW (ref 4.2–5.8)
RBC # FLD: 14.3 % — SIGNIFICANT CHANGE UP (ref 10.3–14.5)
SODIUM SERPL-SCNC: 139 MMOL/L — SIGNIFICANT CHANGE UP (ref 135–145)
WBC # BLD: 6.39 K/UL — SIGNIFICANT CHANGE UP (ref 3.8–10.5)
WBC # FLD AUTO: 6.39 K/UL — SIGNIFICANT CHANGE UP (ref 3.8–10.5)

## 2025-01-05 PROCEDURE — 99233 SBSQ HOSP IP/OBS HIGH 50: CPT

## 2025-01-05 PROCEDURE — 99232 SBSQ HOSP IP/OBS MODERATE 35: CPT

## 2025-01-05 RX ADMIN — HEPARIN SODIUM 5000 UNIT(S): 1000 INJECTION INTRAVENOUS; SUBCUTANEOUS at 13:25

## 2025-01-05 RX ADMIN — DOXAZOSIN MESYLATE 8 MILLIGRAM(S): 8 TABLET ORAL at 21:11

## 2025-01-05 RX ADMIN — Medication 1 DROP(S): at 01:08

## 2025-01-05 RX ADMIN — OFLOXACIN 1 DROP(S): 3 SOLUTION OPHTHALMIC at 11:28

## 2025-01-05 RX ADMIN — Medication 1 DROP(S): at 05:04

## 2025-01-05 RX ADMIN — Medication 1 APPLICATION(S): at 17:25

## 2025-01-05 RX ADMIN — ACETYLCYSTEINE 4 MILLILITER(S): 200 INHALANT RESPIRATORY (INHALATION) at 23:07

## 2025-01-05 RX ADMIN — BACLOFEN 5 MILLIGRAM(S): 10 INJECTION INTRATHECAL at 05:02

## 2025-01-05 RX ADMIN — Medication 15 MILLILITER(S): at 05:03

## 2025-01-05 RX ADMIN — Medication 40 MILLIGRAM(S): at 11:24

## 2025-01-05 RX ADMIN — Medication 1 APPLICATION(S): at 05:05

## 2025-01-05 RX ADMIN — OFLOXACIN 1 DROP(S): 3 SOLUTION OPHTHALMIC at 05:05

## 2025-01-05 RX ADMIN — Medication 100 MILLIGRAM(S): at 13:27

## 2025-01-05 RX ADMIN — ACETYLCYSTEINE 4 MILLILITER(S): 200 INHALANT RESPIRATORY (INHALATION) at 11:24

## 2025-01-05 RX ADMIN — BACLOFEN 5 MILLIGRAM(S): 10 INJECTION INTRATHECAL at 17:06

## 2025-01-05 RX ADMIN — FLUTICASONE PROPIONATE 1 SPRAY(S): 50 SPRAY, METERED NASAL at 05:04

## 2025-01-05 RX ADMIN — Medication 100 MILLIGRAM(S): at 21:10

## 2025-01-05 RX ADMIN — Medication 100 MILLIEQUIVALENT(S): at 08:10

## 2025-01-05 RX ADMIN — OFLOXACIN 1 DROP(S): 3 SOLUTION OPHTHALMIC at 23:08

## 2025-01-05 RX ADMIN — ACETYLCYSTEINE 4 MILLILITER(S): 200 INHALANT RESPIRATORY (INHALATION) at 17:03

## 2025-01-05 RX ADMIN — ACETYLCYSTEINE 4 MILLILITER(S): 200 INHALANT RESPIRATORY (INHALATION) at 05:02

## 2025-01-05 RX ADMIN — Medication 100 MILLIGRAM(S): at 05:03

## 2025-01-05 RX ADMIN — Medication 1 DROP(S): at 13:27

## 2025-01-05 RX ADMIN — HEPARIN SODIUM 5000 UNIT(S): 1000 INJECTION INTRAVENOUS; SUBCUTANEOUS at 05:02

## 2025-01-05 RX ADMIN — LEVETIRACETAM 1500 MILLIGRAM(S): 10 INJECTION, SOLUTION INTRAVENOUS at 17:06

## 2025-01-05 RX ADMIN — Medication 1 SPRAY(S): at 05:05

## 2025-01-05 RX ADMIN — IPRATROPIUM BROMIDE AND ALBUTEROL SULFATE 3 MILLILITER(S): .5; 2.5 SOLUTION RESPIRATORY (INHALATION) at 23:07

## 2025-01-05 RX ADMIN — Medication 1 DROP(S): at 21:21

## 2025-01-05 RX ADMIN — OFLOXACIN 1 DROP(S): 3 SOLUTION OPHTHALMIC at 17:20

## 2025-01-05 RX ADMIN — Medication 1 SPRAY(S): at 17:12

## 2025-01-05 RX ADMIN — LEVETIRACETAM 1500 MILLIGRAM(S): 10 INJECTION, SOLUTION INTRAVENOUS at 05:02

## 2025-01-05 RX ADMIN — FLUTICASONE PROPIONATE 1 SPRAY(S): 50 SPRAY, METERED NASAL at 17:20

## 2025-01-05 RX ADMIN — Medication 15 MILLILITER(S): at 17:06

## 2025-01-05 RX ADMIN — IPRATROPIUM BROMIDE AND ALBUTEROL SULFATE 3 MILLILITER(S): .5; 2.5 SOLUTION RESPIRATORY (INHALATION) at 11:24

## 2025-01-05 RX ADMIN — IPRATROPIUM BROMIDE AND ALBUTEROL SULFATE 3 MILLILITER(S): .5; 2.5 SOLUTION RESPIRATORY (INHALATION) at 05:02

## 2025-01-05 RX ADMIN — Medication 1 DROP(S): at 09:10

## 2025-01-05 RX ADMIN — ERYTHROMYCIN 1 APPLICATION(S): 5 OINTMENT OPHTHALMIC at 21:21

## 2025-01-05 RX ADMIN — HEPARIN SODIUM 5000 UNIT(S): 1000 INJECTION INTRAVENOUS; SUBCUTANEOUS at 21:10

## 2025-01-05 RX ADMIN — Medication 40 MILLIEQUIVALENT(S): at 08:10

## 2025-01-05 RX ADMIN — Medication 1 APPLICATION(S): at 05:04

## 2025-01-05 RX ADMIN — IPRATROPIUM BROMIDE AND ALBUTEROL SULFATE 3 MILLILITER(S): .5; 2.5 SOLUTION RESPIRATORY (INHALATION) at 17:03

## 2025-01-05 RX ADMIN — AMLODIPINE BESYLATE 5 MILLIGRAM(S): 10 TABLET ORAL at 05:03

## 2025-01-05 RX ADMIN — Medication 1 DROP(S): at 17:11

## 2025-01-05 RX ADMIN — Medication 100 MILLIEQUIVALENT(S): at 09:10

## 2025-01-05 NOTE — PROGRESS NOTE ADULT - SUBJECTIVE AND OBJECTIVE BOX
Patient was seen and examined at bedside. Case discuss with the primary team. Pt w/o any events overnight.     OBJECTIVE:  Vital Signs Last 24 Hrs  T(C): 36.3 (05 Jan 2025 08:57), Max: 37.1 (04 Jan 2025 22:30)  T(F): 97.4 (05 Jan 2025 08:57), Max: 98.7 (04 Jan 2025 22:30)  HR: 64 (05 Jan 2025 08:29) (62 - 69)  BP: 116/96 (05 Jan 2025 08:29) (110/79 - 122/80)  BP(mean): 103 (05 Jan 2025 08:29) (87 - 103)  RR: 14 (05 Jan 2025 08:29) (14 - 19)  SpO2: 100% (05 Jan 2025 08:29) (99% - 100%)    Parameters below as of 05 Jan 2025 08:29  Patient On (Oxygen Delivery Method): ventilator    O2 Concentration (%): 40    PHYSICAL EXAM:  General: comfortable-appearing, no accessory muscle use on trach collar  HEENT: tracheostomy   Lungs: no crackles, no wheezes  Heart: regular  Abdomen: soft, no visible tenderness, + BS, +PEG + gabriel in place   Extremities: warm, no edema, not moving to command      LABS:                        10.8   6.39  )-----------( 288      ( 05 Jan 2025 05:30 )             33.5     139  |  104  |  54[H]  ----------------------------<  119[H]  3.5   |  22  |  1.42[H]    Ca    9.6      05 Jan 2025 05:30  Phos  4.2     01-05  Mg     2.6     01-05    acetaminophen   Oral Liquid .. 650 milliGRAM(s) Oral every 6 hours PRN  acetylcysteine 10%  Inhalation 4 milliLiter(s) Inhalation every 6 hours  albuterol/ipratropium for Nebulization 3 milliLiter(s) Nebulizer every 6 hours  amLODIPine   Tablet 5 milliGRAM(s) Oral daily  artificial tears (preservative free) Ophthalmic Solution 1 Drop(s) Right EYE every 4 hours  baclofen 5 milliGRAM(s) Oral every 12 hours  chlorhexidine 0.12% Liquid 15 milliLiter(s) Oral Mucosa every 12 hours  chlorhexidine 2% Cloths 1 Application(s) Topical <User Schedule>  doxazosin 8 milliGRAM(s) Oral at bedtime  erythromycin   Ointment 1 Application(s) Right EYE at bedtime  fluticasone propionate 50 MICROgram(s)/spray Nasal Spray 1 Spray(s) Both Nostrils two times a day  heparin   Injectable 5000 Unit(s) SubCutaneous every 8 hours  levETIRAcetam 1500 milliGRAM(s) Oral two times a day  ofloxacin 0.3% Solution 1 Drop(s) Right EYE four times a day  pantoprazole  Injectable 40 milliGRAM(s) IV Push daily  petrolatum Ophthalmic Ointment 1 Application(s) Both EYES two times a day  phenytoin   Suspension 100 milliGRAM(s) Oral every 8 hours  propranolol 10 milliGRAM(s) Oral every 8 hours  sodium chloride 0.65% Nasal 1 Spray(s) Both Nostrils two times a day

## 2025-01-05 NOTE — PROGRESS NOTE ADULT - SUBJECTIVE AND OBJECTIVE BOX
HPI:  Unknown age male, unknown past medical history (reported stroke and MI by coworkers) presented to [facility] with AMS, Pt was working at [employer] when he was found down by coworkers. EMS called and pt brought to [facility] ED. Intubated, sedated, started on cardene for SBPs in 200s. CT head showed brain stem bleed. Transferred to NSICU for further management.  (30 Sep 2024 12:55)    OVERNIGHT EVENTS: GEROGE overnight, neuro stable.     HOSPITAL COURSE: 9/30: transferred from [facility]. A line placed. Versed dc'd. Cy Rader at bedside, states pt has family in [place], cannot confirm medications or PMH other than stroke and MI. 250cc bolus 3% given. LR switched to NS. hydralazine 25q8 started, 3% started, switched propofol to precedex   10/1: stability CTH done. Added labetalol, started TF. Palliative consulted. ethics consulted to determine surrogate. febrile 103, pan cx sent  10/2: BD 2, GEORGE overnight. TF resumed. Desatt'd to 80s, FiO2 inc. to 50. Fentanyl given, ABG, CXR ordered. Maxxed on precedex, started on propofol for DARIEN -4 - -5. Precedex dc'd. Duonebs, mucomyst, hypertonic added. 3% dc'd. Cardene dc'd. Start vanc/CTX. Increased labetalol 200q8. MRSA negative, dc'd vanc. ETT pulled back 2cm x 2, good positioning after confirmatory chest xray. Ethics attempting to establish HCP with family. Na 159, starting FW 250q6 for range 150-155.   10/3: BD3, GEORGE o/n, neuro stable. Na elevating, FW increased to 300q6. Dc'd bowel reg for diarrhea. vEEG started. SQH 5000q8 tonight.   10/4: BD 4, albumn bolus, incr. LR to 80 2/2 incr. in Cr, LR to 10 0cc/hr for uptrending Cr. Started 7% hypersal for 48hrs and SL atropine for inline/oral thick secretions. Dc'd CTX and started ancef for MSSA in the sputum. Nephrology consulted for CKD, f/u recs. SBP 170s, given hydralazine 10mg IVP.   10/5: BD5, o/n 10mg IVP hydralazine given for SBP 170s and started on hydralazine 25q8 via OGT. 10mg IV push labetalol for SBP > 160s. RT placed for diarrhea.   10/6: BD6, o/n FW increased to 350q4 per nephrology recs. IV tylenol for temp 100.6, SBp 160s presumed uncomfortable.   10/7: BD7, overnight pancultured for temp 101.8F.   10/8: BD8. GEORGE. Cr bumped. decreased LR to 75cc/hr. Adding simethicone ATC. incr hydralazine 50mgTID. Incr labetalol 300mgTID. Na 145, decreased FWF to 250q6. Start precedex. FENa consistent with intrinsic kidney injury. Pend repeat renal US. Retaining up to 1.3L, bladder scans q6, straight cath PRN  10/9: BD 9. GEORGE overnight. Neuro stable. abd xray for distention w non-specific gas pattern, OGT to LIWS for morning. duonebs/mucomyst to q8 for improving secretions. Changed tube feeds to Jevity 1.5 20cc/hr, low rate due to abdominal distention, nepro dense and more difficult to digest. Tolerating CPAP, confirmed by ABG.   10/10: BD 10. GEORGE overnight. Neuro stable. (+) gabriel for urinary retention on bladder scan. inc TF to goal rate of 40cc/hr. family leaning toward pursuing trach/PEG. 1/2 amp for FS 81.   10/11: BD 11. GEORGE overnight. Neuro stable. Trach/PEG consults placed.   10/12: BD 12. GEORGE overnight. Neuro stable. MRI brain complete.   10/13: BD 13. Increase flomax. Hold SQH after PM dose for trach tm. IVL.   10/14: BD 14. GEORGE overnight, remains on AC/VC. Gabriel placed for urinary retention. Dc'd free water.  S/p trach with pulm. NGT placed and CXR confirmed in good position.   10/15: BD 15, GEORGE ovn. resumed feeds. spiked 101, pan cx sent.   10/16: BD 16. GEORGE ovn. Lokelma 5mg for K+ 5.4. Started vanc q 24/zosyn for empiric PNA coverage, IVF to 100/hr. PEG held for fever.   10/17: BD 17,  ordered serum osm and urine osm for am. Started sinemet for neurostimulation. Increased cardura to 0.8. Started FW 100q4, dc'd IVF. MRSA negative, dc'd vanc. NGT replaced d/t coiling.   10/18: BD 18, GEORGE overnight, neuro stable. Amantadine added for neurostim. zosyn changed to unasyn for acinetobacter baumannii, failed TOV and required SC  10/19: BD 19, GEORGE ovn. cardura 2mg added for retention. labetalol decreased 200q8, hydralazine decreased 25q8. Gabriel replaced.   10/20: BD20, GEORGE overnight. NGT dislodged, replaced. PEG tomorrow w/ gen surg, FW increased to 150q4 and labetalol decreased to 100q8, lokelma given for hyperkalemia.   10/21: BD 21. POD0 PEG placement with Gen surg. decr labetolol to 50q8, incr. cardura to 0.4, started lokelma and phoslo, dc gabriel POD0 PEG placement with Gen surg.  10/22: BD 22. Plan to start TF today via PEG. dc labetalol, Following ophtho recs. Increased apnea settings - found to be in cheyne-moe respiration. CPAP 5/5.  10/23: BD 23. hydralazine d/c'd, trach collar trial today. Rectal tube placed at 6am.  10/24: BD24, o/n lokelma held due to diarrhea. Free water 100q6 resumed. dc'd tamsulosin, amantadine. Incr'd cardura to 8mg qhs. Dc'd FW. Switched jevity to nepro. gabriel placed for high urine output. Started SL atropine for oral secretions. Dc'd free water.  10/25: BD25, o/n decreased suctioning requirements to > q4hrs, GEORGE. Cr improving, cont phoslo, lokelma held at this time. Gabriel placed yest, cont. Tolerating trach collar. Given 500cc plasmalyte bolus for ANIKA. Dc'd sinemet.   10/26: BD26, o/n resumed lokelma 5mg daily and resumed 100cc free water q6hrs. Change in neuro status with new right pupillary dilation with anisocoria (right pupil 6mm fixed and left pupil 3mm briskly reactive). Given 23.4% NaCl bullet, taken for emergent CTH showing mostly resolved pontine hemorrhage, continued brainstem hypodensity likely edema d/t hemorrhage, no new hemorrhage or infarct, no herniation, mild increase in size of left lateral ventricle. Vitals remaine stable. Na goal > 140.   10/27: BD27, o/n GEORGE.Neuro stable. Pend stepdown with airway bed.   10/28: BD 28. GEORGE overnight. Neuro stable. Miralax ordered. Gabriel removed, pending TOV.  10/29: BD 29. GEORGE o/n. Given 2L NS over 8 hrs for increased BUN/Cr ratio. Gabriel placed for frequent straight cath.   10/30: BD 30.   10/31: BD 31. GEORGE overnight. Na 149, increased free water to 200q6. 1L NS for uptrending BUN.   11/1: BD 32. GEORGE overnight. Given 1L NS for dehydration. Na 146, increased FW to 250q6.   11/2: BD 33 GEORGE overnight, neuro stable, given 500cc bolus for net negative status and tachycardia   11/3: BD 34, GEORGE overnight, neuro stable. Patient remains tachycardic, EKG showing sinus tachycardia, given additional 500cc NS bolus. Febrile to 101.9F, pan cultured (without UA), CXR WNL, given tylenol.   11/4: BD 35, GEORGE overnight, neuro stable. Given 1L NS for tachycardia. sputum (+) for stenotropohomas maltophilia.   11/5: BD 36 GEORGE overnight, neuro stable. Vancomycin dc'd. Chest PT BID. ID consulted, cont zosyn.  11/6: BD 37. blood cx + klebsiella dc zosyn changed to cefepime, CTAP ordered, rpt blood cx sent.    11/7: BD 38. Pending CT A/P, given 250cc bolus and starting maintenance fluids overnight. Pending CT A/P after bolus   11/8: BD 39. CT CAP negative for infection.   11/9: BD 40. GEORGE overnight.  11/10: BD 41. GEORGE overnight. desat to 85 on trach collar, O2 inc to 10L and 100%, O2 sat inc to 95. pt tachy to 110s, euvolemic. given tylenol. ABG and CXR ordered. spiked fever, pancultured, RVP negative. AM ABG w pO2 79, rpt w pO2 79. pt appears comfortable, satting 94%.   11/11: BD 42. GEORGE overnight. pt became tachy to 130s, desat to 90 on 100% FiO2 and 10L. suctioned, (+) productive cough. temp 101.4, given 1g IV tylenol and 500cc NS bolus for euvolemia. fever and HR downtrending. LE dopplers negative for dvt  11/12: BD 43, GEORGE ovn, fever and HR downtrending, satting 97% 70% FIO2  11/13: BD 44, GEORGE ovn. started standing tylenol x24 hours for tachycardia. desat to 80s, o2 increased. CXR stable, pending CTA PE protocol.   11/14: BD 45, GEORGE overnight, neuro stable. resp therapy dec FiO2 to 70%.   11/15: BD 46, GEORGE overnight, neuro stable.  Rapid called for desaturation 30s, tachycardic 140s. Patient bagged, 100% fio2, heavily suctioned. CXR/POCUS unremarkable. ABG c/w desaturation. WBC 14.71. Afebrile. O2 improved to 90s and patient upgraded to ICU. ABG paO2 30s improved to 89 on vent. IV Tylenol x 1, sputum sent. Start protonix while o-n vent.   11/16: BD 47. POCUS showed collapsable IVCF, given 1L bolus. Vanco/Cefpime added empriic for PNA, NGT feeds restarted. MRSA swab neg, Vanc DC'd.   11/17: BD 48. GEORGE overnight. 1l bolus for tachycardia. Spiked to 101, cultured. 500cc bolus for tachycardia, tachy to 148 given 25mcg fentanyl, 250cc albumin, 1.5L bolus. 5 IV lopressor with response HR to 100s. +Stenotrophomonas on sputum cx.   11/18: BD 49. GEORGE overnight. Consulted ID, cefepime switched to bactrim x7days. Started hydrochlorothiazine 12.5mg daily.  11/19: BD 50. Tachy 120s, given tylenol and 500cc NS. Tolerating 5/5, switched to TCx. Holding phos binder. D/c Bactrim. D/c gabriel, f/u TOV. Dc'd PPI.   11/20: BD 51. GEORGE ovn. 1600 satting low 90s, mildly tachy to 110s, afebrile, RR wnl. O2 improved to mid 90s while inc O2 to 100% on TCx. CPAP 5/5 placed back on.  11/21: BD 52, GEORGE ovn, tolerating CPAP 5/5. Switch to trach collar during the day if tolerating well. HCTZ held for Cr bump, straight cath frequence increased to q4  11/22: BD 53, GEORGE ovn. Resumed phoslo. Gabriel placed. Resumed HCTZ.   11/23: BD 54. Holding tylenol in setting of possible fever, will require pan cx if febrile. Cr improved today. Cont CPAP. Bowel regimen held i/s/o diarrhea. FOBT negative.  11/24: BD 55. GEORGE overnight. Neuro stable. HCTZ dc'ed, started lisinopril 5. Lokelma dc'ed for K 3.7.   11/25: BD 56, GEORGE overnight. Neuro stable. dc'd lisinopril 5mg. Gabriel dc'd. TOV. 1545 noted to be hypotensive, MAP 50, in supine position on chair, HR 60s, afebrile, O2 96%. Given 1L cc NS bolus, placed back on bed in reverse trendelenberg, improved to map of 66. Neostick at bedside. Vitals check q1h. Dc'ed amlodipine. Failed TOV, bladder scan q6, sc prn. Added back Senna.   11/26: BD 57, GEORGE overnight. Neuro stable. Dc'd phoslo.   11/27: BD 58, GEORGE overnight. Neuro stable.    11/28: BD 59. Gabriel replaced.   11/29: GEORGE.  11/30: GEORGE, neuro stable.   12/1: BD 62, GEORGE overnight  12/2: BD 63, GEORGE overnight.; Given 1L bolus of LR for uptrending BUN/Cr.  12/3: BD 64. Reinstated eye gtt/moisture chamber given increased Rt eye injection  12/4: BD 65. GEORGE overnight. Attempted to speak with ophtho regarding eyelid weight/closure but no answer, full mailbox.   12/5: BD 66, GEORGE overnight, bowel regimen increased and had BM.   12/6: BD 67, GEORGE overnight, neuro stable.  12/7: GEORGE overnight, neuro stable. /110s, given x1 hydralazine 10 mg IVP. Restarted home amlodipine 5mg.  12/8: GEORGE. OOB to chair.     12/11: GEORGE, mucomyst added for thick secretions, simethicone for abd distension, abd xray with stool burden, increased bowel regimen.   12/12: GEORGE overnight.   12/13: GEORGE overnight.   12/14: GEORGE overnight  12/15: o/n Patient became tachycardic HR 120s and 10 minutes later O2 sat dropped as low as 89%. Patient suctioned without improvement in O2 sat and tube feeds found in suction catheter. TFs held.  STAT CXR ordered. STAT labs sent. Respiratory therapist called to bedside and patient trach connected to ventilator. After connection to ventilator and further suctioning O2 sat improved to 97% but patient HR remains 120-130s. Upgraded to NSICU for further management. Vancomycin and zosyn started. CTA  chest PE protocol and CTH ordered. Blood cultures sent.given 500cc bolus, rpt ABG sent pO2 243, CTH and CTA chest done. FS while NPO, FiO2 dec 50 pending ABG. sputum cx positive for few GPC and GNR.   12/16: GEORGE overnight, restarted amlodipine, troponin 75   12/17: GEORGE overnight, neuro stable. Attempted CPAP this morning, did not tolerate and back on full vent support. Dc'd Vanc. Tachycardia to 140s, noted extremities to be twitching along with jaw twitching. Given 2g of Keppra, total 4mg ativan and placed on EEG, full set of labs and lactate negative. Resumed trickle feeds at 20cc/hr. Given 250cc albumin for tachycardia.   12/18: GEORGE overnight. Neuro stable. CTH stable. EEG negative and dc'd.   12/19: GEORGE overnight. neuro stable. SIMV most of day, AC/VC at night.   12/20: GEORGE overnight, neuro stable. remains on VC/AC  12/21: GEORGE ovn. 1L bolus for tachy to 120s-130s.   12/22: GEORGE ovn. Tachy to 120s, given tylenol and IVF. 2mg IV ativan given for L jaw twitching. Sx resolved for 3 minutes and started again. Epilepsy contacted. Given 2mg IV ativan. Continued twitching. Increased keppra to 1500mg BID, 2mg ativan given. Propranolol started for refractory tachycardia. vEEG ordered. albumin given. POCUS performed, no b lines. Started on fosphenytoin, loaded w 20mg/kg then 100mg TID. febrile, pancx, started cefepime 2g q8, vancomycin  12/23: GEORGE ovn. +focal motor seizures on eeg. ID consulted. Vancomycin dc'ed as per ID.  12/24: GEORGE ovn, neuro stable. Baclofen 5 mg q12 started for hiccups. Na 129 from 134. Urine lytes c/w SIADH. Given 250cc 3% bolus. CT chest for infection w/u - f/u read. Repeat Na 136. UA negative  12/25: GEORGE ovn.   12/26: GEORGE ovn  12/27: GEORGE overnight. Blood cx neg @ 4 days, DC cefepime per medicine (sputum colonized).   12/28: Desaturation o/n to 80's, CXR obtained, pulse ox changed and sats resolved. Na 133 from 138, 250cc 3% bolus given. Cefepime resumed until 12/30 per ID. Rpt Na 138.  12/29: GEORGE overnight. Na stable.   12/30: GEORGE overnight. Family meeting with son, pt now DNR.   12/31: GEORGE overnight.   1/1: GEORGE overnight, neuro stable.  1/2: GEORGE overnight, neuro stable. FW decreased to 100q6.   1/3: GEORGE overnight, neuro stable. fosphenytoin IV changed to pheytoin PO via PEG per epilepsy recommendations  1/4: GEORGE overnight, neuro stable. FW incr. to 100q4  1/5: GEORGE overnight, neuro stable.     Vital Signs Last 24 Hrs  T(C): 37.1 (04 Jan 2025 22:30), Max: 37.1 (04 Jan 2025 22:30)  T(F): 98.7 (04 Jan 2025 22:30), Max: 98.7 (04 Jan 2025 22:30)  HR: 68 (04 Jan 2025 23:56) (60 - 72)  BP: 112/73 (04 Jan 2025 23:56) (96/67 - 122/80)  BP(mean): 87 (04 Jan 2025 23:56) (77 - 101)  RR: 19 (04 Jan 2025 23:56) (14 - 19)  SpO2: 100% (04 Jan 2025 23:56) (96% - 100%)    Parameters below as of 04 Jan 2025 23:56  Patient On (Oxygen Delivery Method): ventilator    O2 Concentration (%): 40    I&O's Summary    03 Jan 2025 07:01  -  04 Jan 2025 07:00  --------------------------------------------------------  IN: 1440 mL / OUT: 1130 mL / NET: 310 mL    04 Jan 2025 07:01  -  05 Jan 2025 00:03  --------------------------------------------------------  IN: 1080 mL / OUT: 600 mL / NET: 480 mL        PHYSICAL EXAM:  Constitutional: Lying in bed, in NAD  Respiratory: +trach to vent, breathing non-labored, symmetrical chest wall movement  Cardiovascular: RRR, no murmurs  Gastrointestinal: +PEG, abdomen soft, non tender  Neurological:  AAOX2 with choices, opens eyes spontaneously, eyes track vertically.  Cranial Nerves: R pupil 3 mm brisk, L pupil 4 mm brisk, face symmetric.   Motor: RUE HG 1/5, LUE trace WD, RLE TF, LLE WD.   Sensation: unable to assess   Wounds: none       TUBES/LINES:  [x] Gabriel  [] Wound Drains  [] Others      DIET:  [] NPO  [x] Mechanical  [] Tube feeds    LABS:                        10.3   10.06 )-----------( 255      ( 03 Jan 2025 05:30 )             31.4     01-03    136  |  103  |  52[H]  ----------------------------<  103[H]  3.9   |  23  |  1.42[H]    Ca    9.6      03 Jan 2025 05:30  Phos  3.7     01-03  Mg     2.3     01-03        Urinalysis Basic - ( 03 Jan 2025 05:30 )    Color: x / Appearance: x / SG: x / pH: x  Gluc: 103 mg/dL / Ketone: x  / Bili: x / Urobili: x   Blood: x / Protein: x / Nitrite: x   Leuk Esterase: x / RBC: x / WBC x   Sq Epi: x / Non Sq Epi: x / Bacteria: x        CAPILLARY BLOOD GLUCOSE        Drug Levels: [x] N/A    CSF Analysis: [x] N/A    Allergies    Allergy Status Unknown    Intolerances      MEDICATIONS:  Antibiotics:    Neuro:  acetaminophen   Oral Liquid .. 650 milliGRAM(s) Oral every 6 hours PRN  baclofen 5 milliGRAM(s) Oral every 12 hours  levETIRAcetam 1500 milliGRAM(s) Oral two times a day  phenytoin   Suspension 100 milliGRAM(s) Oral every 8 hours    Anticoagulation:  heparin   Injectable 5000 Unit(s) SubCutaneous every 8 hours    OTHER:  acetylcysteine 10%  Inhalation 4 milliLiter(s) Inhalation every 6 hours  albuterol/ipratropium for Nebulization 3 milliLiter(s) Nebulizer every 6 hours  amLODIPine   Tablet 5 milliGRAM(s) Oral daily  artificial tears (preservative free) Ophthalmic Solution 1 Drop(s) Right EYE every 4 hours  chlorhexidine 0.12% Liquid 15 milliLiter(s) Oral Mucosa every 12 hours  chlorhexidine 2% Cloths 1 Application(s) Topical <User Schedule>  doxazosin 8 milliGRAM(s) Oral at bedtime  erythromycin   Ointment 1 Application(s) Right EYE at bedtime  fluticasone propionate 50 MICROgram(s)/spray Nasal Spray 1 Spray(s) Both Nostrils two times a day  ofloxacin 0.3% Solution 1 Drop(s) Right EYE four times a day  pantoprazole  Injectable 40 milliGRAM(s) IV Push daily  petrolatum Ophthalmic Ointment 1 Application(s) Both EYES two times a day  propranolol 10 milliGRAM(s) Oral every 8 hours  sodium chloride 0.65% Nasal 1 Spray(s) Both Nostrils two times a day    IVF:    CULTURES:  Culture Results:   Numerous Klebsiella aerogenes (Previously Enterobacter)  Numerous Stenotrophomonas maltophilia  Commensal iram consistent with body site (12-25 @ 20:05)  Culture Results:   No growth at 5 days (12-22 @ 17:45)    RADIOLOGY & ADDITIONAL TESTS: no recent imaging       ASSESSMENT: 44M PM ?stroke/MI present to [facility] after collapsing at work. Decorticate posturing, vomiting, intubated for airway protection. 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 Transferred to [facility] for further management. s/p trach 10/14. s/p peg 10/21. Re-upgrade to ICU 2/2 desaturation event and suctioning requirements 11/15. Re-upgrade to NSICU 12/15 2/2 desaturation and tachycardia.    Neuro:  - neuro/vitals q4h  - pain control: tylenol prn  - seizure tx: keppra 1500mg BID, phenytoin 100mg PO TID  - vEEG (10/3-4) negative, (10/17-10/19) negative, (12/17-12/18) negative, (12/22-12/25 ) +focal motor seizures not correlating w/ EEG  - epilepsy following  - CTH 9/30: enlarged pontine hemorrhage, CTH 10/3: stable, CTH 10/25: mostly resolved pontine hemorrhage, CTH 12/15: R mastoid air cell opacification; acute otitis media vs sterile effusion, CTH 12/18: stable.  - MRI brain 10/12: parenchymal hemorrhage, acute/subacute R cerebellar stroke    - stroke neuro signed off    CV:  - -160  - tachycardia: propranolol 10mg q8  - HTN: amlodipine 5mg  - echo (9/30) EF 75%, repeat 12/16: 57%    Resp:  - trach to vent, AC/VC 40/400/14/6  - CTA PE protocol 12/15 negative   - Secretions: duonebs/mucomyst/chest PT q6h   - CT Chest 12/24 for infectious w/u: b/l LL atelectasis and opacities     GI:  - TF via PEG (placed 10/21 by gen surg)  - bowel regimen held for loose stool, last BM 1/2  - baclofen 5q12 for hiccups     Renal:  - IVL, FW 100q4 for hydration   - Urinary retenion: Gabriel replaced 12/15  - CKD: trend BUN/Cr  - renal US 10/1: echogenicity c/w chronic med renal dz, repeat 10/8: inc renal echogeicity, c/f medical renal disease w increased hydro     Endo:  - A1c 5.4    Heme:  - DVT ppx: SCDs, SQH 5000u q8h   - LE dopplers negative 12/16    ID:  - last pan cx 12/22  - empiric PNA: cefepime (12/22-12/30), s/p vanc (12/22-12/23), s/p zosyn (12/15-12/20), s/p vanc (12/15-12/16)  - s/p Stenotrophomonas maltophilia PNA: s/p Bactrim (11/18-11/19) s/p Cefepime (11/16-11/18)  - 11/3, (+) sputum for stenotrophomas maltophlia, blood cx (+) klebsiella, cefepime 2gq12 (11/6 - 11/12)   - empiric tx: s/p zosyn (11/3-11/6) , s/p vanc  (11/3-11/5)  - S/p Ancef (10/4-10/14) for PNA, and s/p Unasyn (10/18-10/23) +actinobacter baumanii     MISC:  - ophtho consult for keratitis  - erythromycin ointment R eye q4hrs, ofloxacin ointment R eye QID, artificial tears R eye q2hrs, moisture chamber at bedtime    Dispo: SDU status, DNR, pending PRUCOL for benefits     D/w Dr. D'Amico           Assessment:  Present when checked    []  GCS  E   V  M     Heart Failure: []Acute, [] acute on chronic , []chronic  Heart Failure:  [] Diastolic (HFpEF), [] Systolic (HFrEF), []Combined (HFpEF and HFrEF), [] RHF, [] Pulm HTN, [] Other    [] ANIKA, [] ATN, [] AIN, [] other  [] CKD1, [] CKD2, [] CKD 3, [] CKD 4, [] CKD 5, []ESRD    Encephalopathy: [] Metabolic, [] Hepatic, [] toxic, [] Neurological, [] Other    Abnormal Nurtitional Status: [] malnurtition (see nutrition note), [ ]underweight: BMI < 19, [] morbid obesity: BMI >40, [] Cachexia    [] Sepsis  [] hypovolemic shock,[] cardiogenic shock, [] hemorrhagic shock, [] neuogenic shock  [] Acute Respiratory Failure  []Cerebral edema, [] Brain compression/ herniation,   [] Functional quadriplegia  [] Acute blood loss anemia

## 2025-01-05 NOTE — PROGRESS NOTE ADULT - ASSESSMENT
44M with unclear PMH (?stroke, MI) who was found down at work, intubated for airway protection and found to have acute parenchymal hemorrhage within nabil with mass effect (+ acute/subacute right cerebellar infarct) in setting of hypertensive emergency, transferred to [facility] for further neurosurgical care. Hospital course c/b poor neurologic recovery s/p trach-PEG, AUR s/p gabriel, ANIKA on CKD c/b hyperkalemia, HAP s/p amp-sulbactam (EOT 10/23), K aerogenes bacteremia  treated with 2 weeks of Cefepime per ID , On 11/15 he became septic and was transferred to NSICU due to increased o2 requirements and needed Vent support , Trach Cultures + for Stenotrophomonas which was treated with 7 days of Bactrim per ID , has been weaned of Vent since 11/23 and is now on 10lts at 40% o2 through Trach Collar. Stepped up to the NSICU on 12/15 for hypoxia and tachycardia. PE ruled out. On abx for 5 days. Unclear etiology. Now stepped down to 8Lach.    # Pontine hemorrhage  # Encephalopathy due to Intracranial Bleed   - Acute parenchymal hemorrhage within nabil with mass effect (+ acute/subacute right cerebellar infarct) in setting of hypertensive emergency.   - MRI brain also demonstrated acute/subacute R cerebellar stroke.   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  -Will continue to monitor Na    #Acute kidney injury  # Acute Tubular Necrosis superimposed on CKD stage III  - Pt s/p US renal with chronic medical renal disease  -Pt with increased creatinine 1.31->1.42  -Will continue free water to 100q4h given ANIKA  -Continue to monitor creatinine and check urine lytes if pt's creatinine continues to uptrend     # Seizures  - Continue Seizure precautions   - Continue  Keppra and Fosphenytoin     # Suspected Hospital Acquired Pneumonia ( Resolved)  - Pt had a CT Chest on 12/24 with evidence of Bilateral lower lobe infiltrates   - Pt completed the abx tx course of Cefepime on12/30.  -Pt is afebrile and no leukocytosis     # Hypertension  - Continue amlodipine 5mg daily with holding parameters  -Will continue to monitor BP and titrate  antihypertensive as required     # Chronic respiratory failure.   -Pt s/p percutaneous trach by pulm on 10/14/24  - Currently on Vent Support , No Increase in O2 requirement   - Continue Chest PT    # Neurogenic dysphagia.   - Pt s/p PEG with surgery 10/21/24  - TF per nutrition  - aspiration precautions and elevation of HOB.    #Acute urinary retention.   - doxazosin 8mg    # Functional quadriplegia in setting of brainstem hemorrhage  - decub precautions  - care per nursing protocol     # DVT prop  - Heparin SQ q8     # Dispo:   Pending [place] , Court Appointed Guardian Jason Pierson   - Patient is currently DNR but will proceed without de-escalation at this time    55 minutes spent on total encounter. The necessity of the time spent during the encounter on this date of service was due to:    Review of hospital course, labs, vitals, medical records.  Bedside exam and interview of the patient  Discussed plan of care with primary team   Documenting the encounter.

## 2025-01-06 LAB
ANION GAP SERPL CALC-SCNC: 10 MMOL/L — SIGNIFICANT CHANGE UP (ref 5–17)
BUN SERPL-MCNC: 53 MG/DL — HIGH (ref 7–23)
CALCIUM SERPL-MCNC: 9.5 MG/DL — SIGNIFICANT CHANGE UP (ref 8.4–10.5)
CHLORIDE SERPL-SCNC: 104 MMOL/L — SIGNIFICANT CHANGE UP (ref 96–108)
CO2 SERPL-SCNC: 24 MMOL/L — SIGNIFICANT CHANGE UP (ref 22–31)
CREAT SERPL-MCNC: 1.39 MG/DL — HIGH (ref 0.5–1.3)
EGFR: 63 ML/MIN/1.73M2 — SIGNIFICANT CHANGE UP
EGFR: 63 ML/MIN/1.73M2 — SIGNIFICANT CHANGE UP
GLUCOSE SERPL-MCNC: 108 MG/DL — HIGH (ref 70–99)
HCT VFR BLD CALC: 33 % — LOW (ref 39–50)
HGB BLD-MCNC: 10.4 G/DL — LOW (ref 13–17)
MAGNESIUM SERPL-MCNC: 2.2 MG/DL — SIGNIFICANT CHANGE UP (ref 1.6–2.6)
MCHC RBC-ENTMCNC: 29.6 PG — SIGNIFICANT CHANGE UP (ref 27–34)
MCHC RBC-ENTMCNC: 31.5 G/DL — LOW (ref 32–36)
MCV RBC AUTO: 94 FL — SIGNIFICANT CHANGE UP (ref 80–100)
NRBC # BLD: 0 /100 WBCS — SIGNIFICANT CHANGE UP (ref 0–0)
NRBC BLD-RTO: 0 /100 WBCS — SIGNIFICANT CHANGE UP (ref 0–0)
PHOSPHATE SERPL-MCNC: 3.9 MG/DL — SIGNIFICANT CHANGE UP (ref 2.5–4.5)
PLATELET # BLD AUTO: 257 K/UL — SIGNIFICANT CHANGE UP (ref 150–400)
POTASSIUM SERPL-MCNC: 4.3 MMOL/L — SIGNIFICANT CHANGE UP (ref 3.5–5.3)
POTASSIUM SERPL-SCNC: 4.3 MMOL/L — SIGNIFICANT CHANGE UP (ref 3.5–5.3)
RBC # BLD: 3.51 M/UL — LOW (ref 4.2–5.8)
RBC # FLD: 14.4 % — SIGNIFICANT CHANGE UP (ref 10.3–14.5)
SODIUM SERPL-SCNC: 138 MMOL/L — SIGNIFICANT CHANGE UP (ref 135–145)
WBC # BLD: 9.44 K/UL — SIGNIFICANT CHANGE UP (ref 3.8–10.5)
WBC # FLD AUTO: 9.44 K/UL — SIGNIFICANT CHANGE UP (ref 3.8–10.5)

## 2025-01-06 PROCEDURE — 99233 SBSQ HOSP IP/OBS HIGH 50: CPT

## 2025-01-06 RX ADMIN — Medication 1 APPLICATION(S): at 05:57

## 2025-01-06 RX ADMIN — LEVETIRACETAM 1500 MILLIGRAM(S): 10 INJECTION, SOLUTION INTRAVENOUS at 18:03

## 2025-01-06 RX ADMIN — Medication 15 MILLILITER(S): at 05:51

## 2025-01-06 RX ADMIN — Medication 100 MILLIGRAM(S): at 05:51

## 2025-01-06 RX ADMIN — Medication 1 SPRAY(S): at 17:34

## 2025-01-06 RX ADMIN — LEVETIRACETAM 1500 MILLIGRAM(S): 10 INJECTION, SOLUTION INTRAVENOUS at 05:51

## 2025-01-06 RX ADMIN — IPRATROPIUM BROMIDE AND ALBUTEROL SULFATE 3 MILLILITER(S): .5; 2.5 SOLUTION RESPIRATORY (INHALATION) at 23:15

## 2025-01-06 RX ADMIN — Medication 1 APPLICATION(S): at 17:34

## 2025-01-06 RX ADMIN — Medication 1 DROP(S): at 21:23

## 2025-01-06 RX ADMIN — ACETYLCYSTEINE 4 MILLILITER(S): 200 INHALANT RESPIRATORY (INHALATION) at 23:15

## 2025-01-06 RX ADMIN — BACLOFEN 5 MILLIGRAM(S): 10 INJECTION INTRATHECAL at 05:52

## 2025-01-06 RX ADMIN — Medication 1 DROP(S): at 17:33

## 2025-01-06 RX ADMIN — ACETYLCYSTEINE 4 MILLILITER(S): 200 INHALANT RESPIRATORY (INHALATION) at 11:06

## 2025-01-06 RX ADMIN — OFLOXACIN 1 DROP(S): 3 SOLUTION OPHTHALMIC at 23:24

## 2025-01-06 RX ADMIN — FLUTICASONE PROPIONATE 1 SPRAY(S): 50 SPRAY, METERED NASAL at 17:33

## 2025-01-06 RX ADMIN — DOXAZOSIN MESYLATE 8 MILLIGRAM(S): 8 TABLET ORAL at 21:20

## 2025-01-06 RX ADMIN — IPRATROPIUM BROMIDE AND ALBUTEROL SULFATE 3 MILLILITER(S): .5; 2.5 SOLUTION RESPIRATORY (INHALATION) at 18:03

## 2025-01-06 RX ADMIN — OFLOXACIN 1 DROP(S): 3 SOLUTION OPHTHALMIC at 11:07

## 2025-01-06 RX ADMIN — HEPARIN SODIUM 5000 UNIT(S): 1000 INJECTION INTRAVENOUS; SUBCUTANEOUS at 14:29

## 2025-01-06 RX ADMIN — FLUTICASONE PROPIONATE 1 SPRAY(S): 50 SPRAY, METERED NASAL at 05:56

## 2025-01-06 RX ADMIN — IPRATROPIUM BROMIDE AND ALBUTEROL SULFATE 3 MILLILITER(S): .5; 2.5 SOLUTION RESPIRATORY (INHALATION) at 11:06

## 2025-01-06 RX ADMIN — IPRATROPIUM BROMIDE AND ALBUTEROL SULFATE 3 MILLILITER(S): .5; 2.5 SOLUTION RESPIRATORY (INHALATION) at 05:52

## 2025-01-06 RX ADMIN — OFLOXACIN 1 DROP(S): 3 SOLUTION OPHTHALMIC at 17:33

## 2025-01-06 RX ADMIN — Medication 1 DROP(S): at 05:56

## 2025-01-06 RX ADMIN — Medication 1 SPRAY(S): at 05:56

## 2025-01-06 RX ADMIN — BACLOFEN 5 MILLIGRAM(S): 10 INJECTION INTRATHECAL at 18:02

## 2025-01-06 RX ADMIN — ERYTHROMYCIN 1 APPLICATION(S): 5 OINTMENT OPHTHALMIC at 21:23

## 2025-01-06 RX ADMIN — AMLODIPINE BESYLATE 5 MILLIGRAM(S): 10 TABLET ORAL at 05:52

## 2025-01-06 RX ADMIN — Medication 100 MILLIGRAM(S): at 21:20

## 2025-01-06 RX ADMIN — HEPARIN SODIUM 5000 UNIT(S): 1000 INJECTION INTRAVENOUS; SUBCUTANEOUS at 21:20

## 2025-01-06 RX ADMIN — HEPARIN SODIUM 5000 UNIT(S): 1000 INJECTION INTRAVENOUS; SUBCUTANEOUS at 05:51

## 2025-01-06 RX ADMIN — Medication 40 MILLIGRAM(S): at 11:07

## 2025-01-06 RX ADMIN — Medication 1 DROP(S): at 01:04

## 2025-01-06 RX ADMIN — ACETYLCYSTEINE 4 MILLILITER(S): 200 INHALANT RESPIRATORY (INHALATION) at 05:51

## 2025-01-06 RX ADMIN — Medication 1 DROP(S): at 14:29

## 2025-01-06 RX ADMIN — Medication 1 DROP(S): at 09:43

## 2025-01-06 RX ADMIN — ACETYLCYSTEINE 4 MILLILITER(S): 200 INHALANT RESPIRATORY (INHALATION) at 18:03

## 2025-01-06 RX ADMIN — Medication 15 MILLILITER(S): at 18:03

## 2025-01-06 RX ADMIN — Medication 100 MILLIGRAM(S): at 12:40

## 2025-01-06 RX ADMIN — OFLOXACIN 1 DROP(S): 3 SOLUTION OPHTHALMIC at 05:56

## 2025-01-06 NOTE — PROGRESS NOTE ADULT - TIME BILLING
Bedside exam and interview   Reviewed vitals, labs, Telemetry   Discussed patient's plan of care with primary team   Documentation of encounter

## 2025-01-06 NOTE — PROGRESS NOTE ADULT - SUBJECTIVE AND OBJECTIVE BOX
HPI:  Unknown age male, unknown past medical history (reported stroke and MI by coworkers) presented to [facility] with AMS, Pt was working at [employer] when he was found down by coworkers. EMS called and pt brought to [facility] ED. Intubated, sedated, started on cardene for SBPs in 200s. CT head showed brain stem bleed. Transferred to NSICU for further management.  (30 Sep 2024 12:55)      HOSPITAL COURSE:  9/30: transferred from [facility]. A line placed. Versed dc'd. Cy Rader at bedside, states pt has family in [place], cannot confirm medications or PMH other than stroke and MI. 250cc bolus 3% given. LR switched to NS. hydralazine 25q8 started, 3% started, switched propofol to precedex   10/1: stability CTH done. Added labetalol, started TF. Palliative consulted. ethics consulted to determine surrogate. febrile 103, pan cx sent  10/2: BD 2, GEORGE overnight. TF resumed. Desatt'd to 80s, FiO2 inc. to 50. Fentanyl given, ABG, CXR ordered. Maxxed on precedex, started on propofol for DARIEN -4 - -5. Precedex dc'd. Duonebs, mucomyst, hypertonic added. 3% dc'd. Cardene dc'd. Start vanc/CTX. Increased labetalol 200q8. MRSA negative, dc'd vanc. ETT pulled back 2cm x 2, good positioning after confirmatory chest xray. Ethics attempting to establish HCP with family. Na 159, starting FW 250q6 for range 150-155.   10/3: BD3, GEORGE o/n, neuro stable. Na elevating, FW increased to 300q6. Dc'd bowel reg for diarrhea. vEEG started. SQH 5000q8 tonight.   10/4: BD 4, albumn bolus, incr. LR to 80 2/2 incr. in Cr, LR to 10 0cc/hr for uptrending Cr. Started 7% hypersal for 48hrs and SL atropine for inline/oral thick secretions. Dc'd CTX and started ancef for MSSA in the sputum. Nephrology consulted for CKD, f/u recs. SBP 170s, given hydralazine 10mg IVP.   10/5: BD5, o/n 10mg IVP hydralazine given for SBP 170s and started on hydralazine 25q8 via OGT. 10mg IV push labetalol for SBP > 160s. RT placed for diarrhea.   10/6: BD6, o/n FW increased to 350q4 per nephrology recs. IV tylenol for temp 100.6, SBp 160s presumed uncomfortable.   10/7: BD7, overnight pancultured for temp 101.8F.   10/8: BD8. GEORGE. Cr bumped. decreased LR to 75cc/hr. Adding simethicone ATC. incr hydralazine 50mgTID. Incr labetalol 300mgTID. Na 145, decreased FWF to 250q6. Start precedex. FENa consistent with intrinsic kidney injury. Pend repeat renal US. Retaining up to 1.3L, bladder scans q6, straight cath PRN  10/9: BD 9. GEORGE overnight. Neuro stable. abd xray for distention w non-specific gas pattern, OGT to LIWS for morning. duonebs/mucomyst to q8 for improving secretions. Changed tube feeds to Jevity 1.5 20cc/hr, low rate due to abdominal distention, nepro dense and more difficult to digest. Tolerating CPAP, confirmed by ABG.   10/10: BD 10. GEORGE overnight. Neuro stable. (+) gabriel for urinary retention on bladder scan. inc TF to goal rate of 40cc/hr. family leaning toward pursuing trach/PEG. 1/2 amp for FS 81.   10/11: BD 11. GEORGE overnight. Neuro stable. Trach/PEG consults placed.   10/12: BD 12. GEORGE overnight. Neuro stable. MRI brain complete.   10/13: BD 13. Increase flomax. Hold SQH after PM dose for trach tm. IVL.   10/14: BD 14. GEORGE overnight, remains on AC/VC. Gabriel placed for urinary retention. Dc'd free water.  S/p trach with pulm. NGT placed and CXR confirmed in good position.   10/15: BD 15, GEORGE ovn. resumed feeds. spiked 101, pan cx sent.   10/16: BD 16. GEORGE ovn. Lokelma 5mg for K+ 5.4. Started vanc q 24/zosyn for empiric PNA coverage, IVF to 100/hr. PEG held for fever.   10/17: BD 17,  ordered serum osm and urine osm for am. Started sinemet for neurostimulation. Increased cardura to 0.8. Started FW 100q4, dc'd IVF. MRSA negative, dc'd vanc. NGT replaced d/t coiling.   10/18: BD 18, GEORGE overnight, neuro stable. Amantadine added for neurostim. zosyn changed to unasyn for acinetobacter baumannii, failed TOV and required SC  10/19: BD 19, GEORGE ovn. cardura 2mg added for retention. labetalol decreased 200q8, hydralazine decreased 25q8. Gabriel replaced.   10/20: BD20, GEORGE overnight. NGT dislodged, replaced. PEG tomorrow w/ gen surg, FW increased to 150q4 and labetalol decreased to 100q8, lokelma given for hyperkalemia.   10/21: BD 21. POD0 PEG placement with Gen surg. decr labetolol to 50q8, incr. cardura to 0.4, started lokelma and phoslo, dc gabriel POD0 PEG placement with Gen surg.  10/22: BD 22. Plan to start TF today via PEG. dc labetalol, Following ophtho recs. Increased apnea settings - found to be in cheyne-moe respiration. CPAP 5/5.  10/23: BD 23. hydralazine d/c'd, trach collar trial today. Rectal tube placed at 6am.  10/24: BD24, o/n lokelma held due to diarrhea. Free water 100q6 resumed. dc'd tamsulosin, amantadine. Incr'd cardura to 8mg qhs. Dc'd FW. Switched jevity to nepro. gabriel placed for high urine output. Started SL atropine for oral secretions. Dc'd free water.  10/25: BD25, o/n decreased suctioning requirements to > q4hrs, GEORGE. Cr improving, cont phoslo, lokelma held at this time. Gabriel placed yest, cont. Tolerating trach collar. Given 500cc plasmalyte bolus for ANIKA. Dc'd sinemet.   10/26: BD26, o/n resumed lokelma 5mg daily and resumed 100cc free water q6hrs. Change in neuro status with new right pupillary dilation with anisocoria (right pupil 6mm fixed and left pupil 3mm briskly reactive). Given 23.4% NaCl bullet, taken for emergent CTH showing mostly resolved pontine hemorrhage, continued brainstem hypodensity likely edema d/t hemorrhage, no new hemorrhage or infarct, no herniation, mild increase in size of left lateral ventricle. Vitals remaine stable. Na goal > 140.   10/27: BD27, o/n GEORGE.Neuro stable. Pend stepdown with airway bed.   10/28: BD 28. GEORGE overnight. Neuro stable. Miralax ordered. Gabriel removed, pending TOV.  10/29: BD 29. GEORGE o/n. Given 2L NS over 8 hrs for increased BUN/Cr ratio. Gabriel placed for frequent straight cath.   10/30: BD 30.   10/31: BD 31. GEORGE overnight. Na 149, increased free water to 200q6. 1L NS for uptrending BUN.   11/1: BD 32. GEORGE overnight. Given 1L NS for dehydration. Na 146, increased FW to 250q6.   11/2: BD 33 GEORGE overnight, neuro stable, given 500cc bolus for net negative status and tachycardia   11/3: BD 34, GEORGE overnight, neuro stable. Patient remains tachycardic, EKG showing sinus tachycardia, given additional 500cc NS bolus. Febrile to 101.9F, pan cultured (without UA), CXR WNL, given tylenol.   11/4: BD 35, GEORGE overnight, neuro stable. Given 1L NS for tachycardia. sputum (+) for stenotropohomas maltophilia.   11/5: BD 36 GEORGE overnight, neuro stable. Vancomycin dc'd. Chest PT BID. ID consulted, cont zosyn.  11/6: BD 37. blood cx + klebsiella dc zosyn changed to cefepime, CTAP ordered, rpt blood cx sent.    11/7: BD 38. Pending CT A/P, given 250cc bolus and starting maintenance fluids overnight. Pending CT A/P after bolus   11/8: BD 39. CT CAP negative for infection.   11/9: BD 40. GEORGE overnight.  11/10: BD 41. GEORGE overnight. desat to 85 on trach collar, O2 inc to 10L and 100%, O2 sat inc to 95. pt tachy to 110s, euvolemic. given tylenol. ABG and CXR ordered. spiked fever, pancultured, RVP negative. AM ABG w pO2 79, rpt w pO2 79. pt appears comfortable, satting 94%.   11/11: BD 42. GEORGE overnight. pt became tachy to 130s, desat to 90 on 100% FiO2 and 10L. suctioned, (+) productive cough. temp 101.4, given 1g IV tylenol and 500cc NS bolus for euvolemia. fever and HR downtrending. LE dopplers negative for dvt  11/12: BD 43, GEORGE ovn, fever and HR downtrending, satting 97% 70% FIO2  11/13: BD 44, GEORGE ovn. started standing tylenol x24 hours for tachycardia. desat to 80s, o2 increased. CXR stable, pending CTA PE protocol.   11/14: BD 45, GEORGE overnight, neuro stable. resp therapy dec FiO2 to 70%.   11/15: BD 46, GEORGE overnight, neuro stable.  Rapid called for desaturation 30s, tachycardic 140s. Patient bagged, 100% fio2, heavily suctioned. CXR/POCUS unremarkable. ABG c/w desaturation. WBC 14.71. Afebrile. O2 improved to 90s and patient upgraded to ICU. ABG paO2 30s improved to 89 on vent. IV Tylenol x 1, sputum sent. Start protonix while o-n vent.   11/16: BD 47. POCUS showed collapsable IVCF, given 1L bolus. Vanco/Cefpime added empriic for PNA, NGT feeds restarted. MRSA swab neg, Vanc DC'd.   11/17: BD 48. GEORGE overnight. 1l bolus for tachycardia. Spiked to 101, cultured. 500cc bolus for tachycardia, tachy to 148 given 25mcg fentanyl, 250cc albumin, 1.5L bolus. 5 IV lopressor with response HR to 100s. +Stenotrophomonas on sputum cx.   11/18: BD 49. GEORGE overnight. Consulted ID, cefepime switched to bactrim x7days. Started hydrochlorothiazine 12.5mg daily.  11/19: BD 50. Tachy 120s, given tylenol and 500cc NS. Tolerating 5/5, switched to TCx. Holding phos binder. D/c Bactrim. D/c gabriel, f/u TOV. Dc'd PPI.   11/20: BD 51. GEORGE ovn. 1600 satting low 90s, mildly tachy to 110s, afebrile, RR wnl. O2 improved to mid 90s while inc O2 to 100% on TCx. CPAP 5/5 placed back on.  11/21: BD 52, GEORGE ovn, tolerating CPAP 5/5. Switch to trach collar during the day if tolerating well. HCTZ held for Cr bump, straight cath frequence increased to q4  11/22: BD 53, GEORGE ovn. Resumed phoslo. Gabriel placed. Resumed HCTZ.   11/23: BD 54. Holding tylenol in setting of possible fever, will require pan cx if febrile. Cr improved today. Cont CPAP. Bowel regimen held i/s/o diarrhea. FOBT negative.  11/24: BD 55. GEORGE overnight. Neuro stable. HCTZ dc'ed, started lisinopril 5. Lokelma dc'ed for K 3.7.   11/25: BD 56, GEORGE overnight. Neuro stable. dc'd lisinopril 5mg. Gabriel dc'd. TOV. 1545 noted to be hypotensive, MAP 50, in supine position on chair, HR 60s, afebrile, O2 96%. Given 1L cc NS bolus, placed back on bed in reverse trendelenberg, improved to map of 66. Neostick at bedside. Vitals check q1h. Dc'ed amlodipine. Failed TOV, bladder scan q6, sc prn. Added back Senna.   11/26: BD 57, GEORGE overnight. Neuro stable. Dc'd phoslo.   11/27: BD 58, GEORGE overnight. Neuro stable.    11/28: BD 59. Gabriel replaced.   11/29: GEORGE.  11/30: GEORGE, neuro stable.   12/1: BD 62, GEORGE overnight  12/2: BD 63, GEORGE overnight.; Given 1L bolus of LR for uptrending BUN/Cr.  12/3: BD 64. Reinstated eye gtt/moisture chamber given increased Rt eye injection  12/4: BD 65. GEORGE overnight. Attempted to speak with ophtho regarding eyelid weight/closure but no answer, full mailbox.   12/5: BD 66, GEORGE overnight, bowel regimen increased and had BM.   12/6: BD 67, GEORGE overnight, neuro stable.  12/7: GEORGE overnight, neuro stable. /110s, given x1 hydralazine 10 mg IVP. Restarted home amlodipine 5mg.  12/8: GEORGE. OOB to chair.     12/11: GEORGE, mucomyst added for thick secretions, simethicone for abd distension, abd xray with stool burden, increased bowel regimen.   12/12: GEORGE overnight.   12/13: GEORGE overnight.   12/14: GEORGE overnight  12/15: o/n Patient became tachycardic HR 120s and 10 minutes later O2 sat dropped as low as 89%. Patient suctioned without improvement in O2 sat and tube feeds found in suction catheter. TFs held.  STAT CXR ordered. STAT labs sent. Respiratory therapist called to bedside and patient trach connected to ventilator. After connection to ventilator and further suctioning O2 sat improved to 97% but patient HR remains 120-130s. Upgraded to NSICU for further management. Vancomycin and zosyn started. CTA  chest PE protocol and CTH ordered. Blood cultures sent.given 500cc bolus, rpt ABG sent pO2 243, CTH and CTA chest done. FS while NPO, FiO2 dec 50 pending ABG. sputum cx positive for few GPC and GNR.   12/16: GEORGE overnight, restarted amlodipine, troponin 75   12/17: GEORGE overnight, neuro stable. Attempted CPAP this morning, did not tolerate and back on full vent support. Dc'd Vanc. Tachycardia to 140s, noted extremities to be twitching along with jaw twitching. Given 2g of Keppra, total 4mg ativan and placed on EEG, full set of labs and lactate negative. Resumed trickle feeds at 20cc/hr. Given 250cc albumin for tachycardia.   12/18: GEORGE overnight. Neuro stable. CTH stable. EEG negative and dc'd.   12/19: GEORGE overnight. neuro stable. SIMV most of day, AC/VC at night.   12/20: GEORGE overnight, neuro stable. remains on VC/AC  12/21: GEORGE ovn. 1L bolus for tachy to 120s-130s.   12/22: GEORGE ovn. Tachy to 120s, given tylenol and IVF. 2mg IV ativan given for L jaw twitching. Sx resolved for 3 minutes and started again. Epilepsy contacted. Given 2mg IV ativan. Continued twitching. Increased keppra to 1500mg BID, 2mg ativan given. Propranolol started for refractory tachycardia. vEEG ordered. albumin given. POCUS performed, no b lines. Started on fosphenytoin, loaded w 20mg/kg then 100mg TID. febrile, pancx, started cefepime 2g q8, vancomycin  12/23: GEORGE ovn. +focal motor seizures on eeg. ID consulted. Vancomycin dc'ed as per ID.  12/24: GEORGE ovn, neuro stable. Baclofen 5 mg q12 started for hiccups. Na 129 from 134. Urine lytes c/w SIADH. Given 250cc 3% bolus. CT chest for infection w/u - f/u read. Repeat Na 136. UA negative  12/25: GEORGE ovn.   12/26: GEORGE ovn  12/27: GEORGE overnight. Blood cx neg @ 4 days, DC cefepime per medicine (sputum colonized).   12/28: Desaturation o/n to 80's, CXR obtained, pulse ox changed and sats resolved. Na 133 from 138, 250cc 3% bolus given. Cefepime resumed until 12/30 per ID. Rpt Na 138.  12/29: GEORGE overnight. Na stable.   12/30: GEORGE overnight. Family meeting with son, pt now DNR.   12/31: GEORGE overnight.   1/1: GEORGE overnight, neuro stable.  1/2: GEORGE overnight, neuro stable. FW decreased to 100q6.   1/3: GEORGE overnight, neuro stable. fosphenytoin IV changed to pheytoin PO via PEG per epilepsy recommendations  1/4: GEORGE overnight, neuro stable. FW incr. to 100q4  1/5: GEORGE overnight, neuro stable.   1/6: GEORGE overnight, neuro stable     OVERNIGHT EVENTS: Pt seen and examined in bed, unable to offer complaints at this time.     Vital Signs Last 24 Hrs  T(C): 37.5 (05 Jan 2025 21:25), Max: 37.5 (05 Jan 2025 21:25)  T(F): 99.5 (05 Jan 2025 21:25), Max: 99.5 (05 Jan 2025 21:25)  HR: 73 (06 Jan 2025 00:39) (63 - 77)  BP: 102/55 (05 Jan 2025 23:56) (102/55 - 116/96)  BP(mean): 73 (05 Jan 2025 23:56) (73 - 103)  RR: 16 (06 Jan 2025 00:39) (14 - 17)  SpO2: 100% (06 Jan 2025 00:39) (98% - 100%)    Parameters below as of 06 Jan 2025 00:39  Patient On (Oxygen Delivery Method): ventilator    O2 Concentration (%): 40    I&O's Summary    04 Jan 2025 07:01  -  05 Jan 2025 07:00  --------------------------------------------------------  IN: 1260 mL / OUT: 1200 mL / NET: 60 mL    05 Jan 2025 07:01  -  06 Jan 2025 03:18  --------------------------------------------------------  IN: 1220 mL / OUT: 700 mL / NET: 520 mL        PHYSICAL EXAM:  Constitutional: Pt found in bed in NAD   ENT: PERRL, vertical EOMi  Respiratory: +trach, breathing non-labored, symmetrical chest wall movement  Cardiovascuar: RRR, no murmurs  Gastrointestinal: +PEG, abdomen soft, non tender  Neurological:  AAOX1 with choices, following commands with handgrip, OE spontaneously  Motor: RUE wiggles fingers spontaneously HG 1/5, LUE LLE RLE withdraw from noxious   Pronator Drift: unable to assess   Wounds/Drains: N/A    TUBES/LINES:  [x] Gabriel  [] Lumbar Drain  [] Wound Drains  [] Others      DIET:  [] NPO  [] Mechanical  [x] Tube feeds via PEG     LABS:                        10.8   6.39  )-----------( 288      ( 05 Jan 2025 05:30 )             33.5     01-05    139  |  104  |  54[H]  ----------------------------<  119[H]  3.5   |  22  |  1.42[H]    Ca    9.6      05 Jan 2025 05:30  Phos  4.2     01-05  Mg     2.6     01-05        Urinalysis Basic - ( 05 Jan 2025 05:30 )    Color: x / Appearance: x / SG: x / pH: x  Gluc: 119 mg/dL / Ketone: x  / Bili: x / Urobili: x   Blood: x / Protein: x / Nitrite: x   Leuk Esterase: x / RBC: x / WBC x   Sq Epi: x / Non Sq Epi: x / Bacteria: x          CAPILLARY BLOOD GLUCOSE          Drug Levels: [] N/A    CSF Analysis: [] N/A      Allergies    Allergy Status Unknown    Intolerances      MEDICATIONS:  Antibiotics:    Neuro:  acetaminophen   Oral Liquid .. 650 milliGRAM(s) Oral every 6 hours PRN  baclofen 5 milliGRAM(s) Oral every 12 hours  levETIRAcetam 1500 milliGRAM(s) Oral two times a day  phenytoin   Suspension 100 milliGRAM(s) Oral every 8 hours    Anticoagulation:  heparin   Injectable 5000 Unit(s) SubCutaneous every 8 hours    OTHER:  acetylcysteine 10%  Inhalation 4 milliLiter(s) Inhalation every 6 hours  albuterol/ipratropium for Nebulization 3 milliLiter(s) Nebulizer every 6 hours  amLODIPine   Tablet 5 milliGRAM(s) Oral daily  artificial tears (preservative free) Ophthalmic Solution 1 Drop(s) Right EYE every 4 hours  chlorhexidine 0.12% Liquid 15 milliLiter(s) Oral Mucosa every 12 hours  chlorhexidine 2% Cloths 1 Application(s) Topical <User Schedule>  doxazosin 8 milliGRAM(s) Oral at bedtime  erythromycin   Ointment 1 Application(s) Right EYE at bedtime  fluticasone propionate 50 MICROgram(s)/spray Nasal Spray 1 Spray(s) Both Nostrils two times a day  ofloxacin 0.3% Solution 1 Drop(s) Right EYE four times a day  pantoprazole  Injectable 40 milliGRAM(s) IV Push daily  petrolatum Ophthalmic Ointment 1 Application(s) Both EYES two times a day  propranolol 10 milliGRAM(s) Oral every 8 hours  sodium chloride 0.65% Nasal 1 Spray(s) Both Nostrils two times a day    IVF:    CULTURES:  Culture Results:   Numerous Klebsiella aerogenes (Previously Enterobacter)  Numerous Stenotrophomonas maltophilia  Commensal iram consistent with body site (12-25 @ 20:05)    RADIOLOGY & ADDITIONAL TESTS:      ASSESSMENT:  44M PMH ?stroke/MI present to [facility] after collapsing at work. Decorticate posturing, vomiting, intubated for airway protection. 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 Transferred to [facility] for further management. s/p trach 10/14. s/p peg 10/21. Re-upgrade to ICU 2/2 desaturation event and suctioning requirements 11/15. Re-upgrade to NSICU 12/15 2/2 desaturation and tachycardia.    Neuro:  - neuro/vitals q4h  - pain control: tylenol prn  - seizure tx: keppra 1500mg BID, phenytoin 100mg PO TID  - vEEG (10/3-4) negative, (10/17-10/19) negative, (12/17-12/18) negative, (12/22-12/25 ) +focal motor seizures not correlating w/ EEG  - epilepsy following  - CTH 9/30: enlarged pontine hemorrhage, CTH 10/3: stable, CTH 10/25: mostly resolved pontine hemorrhage, CTH 12/15: R mastoid air cell opacification; acute otitis media vs sterile effusion, CTH 12/18: stable.  - MRI brain 10/12: parenchymal hemorrhage, acute/subacute R cerebellar stroke    - stroke neuro signed off    CV:  - -160  - tachycardia: propranolol 10mg q8  - HTN: amlodipine 5mg  - echo (9/30) EF 75%, repeat 12/16: 57%    Resp:  - trach to vent, AC/VC 40/400/14/6  - CTA PE protocol 12/15 negative   - Secretions: duonebs/mucomyst/chest PT q6h   - CT Chest 12/24 for infectious w/u: b/l LL atelectasis and opacities     GI:  - TF via PEG (placed 10/21 by gen surg)  - bowel regimen held for loose stool, last BM 1/5  - baclofen 5q12 for hiccups     Renal:  - IVL, FW 100q4 for hydration   - Urinary retenion: Gabriel replaced 12/15  - CKD: trend BUN/Cr  - renal US 10/1: echogenicity c/w chronic med renal dz, repeat 10/8: inc renal echogeicity, c/f medical renal disease w increased hydro     Endo:  - A1c 5.4    Heme:  - DVT ppx: SCDs, SQH 5000u q8h   - LE dopplers negative 12/16    ID:  - last pan cx 12/22  - empiric PNA: cefepime (12/22-12/30), s/p vanc (12/22-12/23), s/p zosyn (12/15-12/20), s/p vanc (12/15-12/16)  - s/p Stenotrophomonas maltophilia PNA: s/p Bactrim (11/18-11/19) s/p Cefepime (11/16-11/18)  - 11/3, (+) sputum for stenotrophomas maltophlia, blood cx (+) klebsiella, cefepime 2gq12 (11/6 - 11/12)   - empiric tx: s/p zosyn (11/3-11/6) , s/p vanc  (11/3-11/5)  - S/p Ancef (10/4-10/14) for PNA, and s/p Unasyn (10/18-10/23) +actinobacter baumanii     MISC:  - ophtho consult for keratitis  - erythromycin ointment R eye q4hrs, ofloxacin ointment R eye QID, artificial tears R eye q2hrs, moisture chamber at bedtime    Dispo: SDU status, DNR, pending PRUCOL for benefits     D/w Dr. D'Amico

## 2025-01-06 NOTE — PROGRESS NOTE ADULT - ASSESSMENT
44M with unclear PMH (?stroke, MI) who was found down at work, intubated for airway protection and found to have acute parenchymal hemorrhage within nabil with mass effect (+ acute/subacute right cerebellar infarct) in setting of hypertensive emergency, transferred to [facility] for further neurosurgical care. Hospital course c/b poor neurologic recovery s/p trach-PEG, AUR s/p gabriel, ANIKA on CKD c/b hyperkalemia, HAP s/p amp-sulbactam (EOT 10/23), K aerogenes bacteremia  treated with 2 weeks of Cefepime per ID , On 11/15 he became septic and was transferred to NSICU due to increased o2 requirements and needed Vent support , Trach Cultures + for Stenotrophomonas which was treated with 7 days of Bactrim per ID , has been weaned of Vent since 11/23 and is now on 10lts at 40% o2 through Trach Collar. Stepped up to the NSICU on 12/15 for hypoxia and tachycardia. PE ruled out. On abx for 5 days. Unclear etiology. Now stepped down to 8Lach.    # Pontine hemorrhage  # Encephalopathy due to Intracranial Bleed   - Acute parenchymal hemorrhage within nabil with mass effect (+ acute/subacute right cerebellar infarct) in setting of hypertensive emergency.   - MRI brain also demonstrated acute/subacute R cerebellar stroke.   - No Neurosurgical Interventions were performed   - Secondary to IPH and cerebellar stroke patient has become Trach and Peg Dependent    # Hyponatremia (resolved)  - Continue to monitor Na    #Acute kidney injury  # Acute Tubular Necrosis superimposed on CKD stage III  - s/p US renal with chronic medical renal disease  - Creat 1.3 this morning, continue on Free water 100 cc q4h. Patient with Gabriel in place, noted with  over last 24. Monitor UOP, bladder scan. If remains with low UOP, and no Gabriel disfunction, will do fluid challenge     # Seizures  - Continue Seizure precautions   - Continue  Keppra and Fosphenytoin     # Suspected Hospital Acquired Pneumonia ( Resolved)  - Pt had a CT Chest on 12/24 with evidence of Bilateral lower lobe infiltrates   - Pt completed the abx tx course of Cefepime on12/30.  -  Continue to monitor vent requirement, WBC  - Care per nursing, chest PT     # Hypertension  - Continue amlodipine 5mg daily with holding parameters  - Will continue to monitor BP and titrate  antihypertensive as required     # Chronic respiratory failure.   - S/p percutaneous trach by pulm on 10/14/24  - Currently on Vent Support   - Continue Chest PT    # Neurogenic dysphagia.   - Pt s/p PEG with surgery 10/21/24  - TF per nutrition  - aspiration precautions and elevation of HOB.    #Acute urinary retention.   - doxazosin 8mg  - follow-up UOP per above     # Functional quadriplegia in setting of brainstem hemorrhage  - decub precautions  - care per nursing protocol     # DVT prop  - Heparin SQ q8   Discussed with primary team,

## 2025-01-06 NOTE — PROGRESS NOTE ADULT - SUBJECTIVE AND OBJECTIVE BOX
SUBJECTIVE:  Patient was seen and examined at bedside. Patient looking comfortable, on vent. No labored breathing, afebrile. No other complaints or events reported.    Review of systems: unable to obtain    Diet, NPO with Tube Feed:   Tube Feeding Modality: Gastrostomy  Maribeth Sauceda Renal Support 1.8  Total Volume for 24 Hours (mL): 1080  Total Number of Cans: 5  Continuous  Starting Tube Feed Rate mL per Hour: 30  Increase Tube Feed Rate by (mL): 10     Every 4 hours  Until Goal Tube Feed Rate (mL per Hour): 60  Tube Feed Duration (in Hours): 18  Tube Feed Start Time: 10:00  Tube Feed Stop Time: 04:00  Banatrol TF     Qty per Day:  2 (12-18-24 @ 09:33) [Active]      MEDICATIONS:  MEDICATIONS  (STANDING):  acetylcysteine 10%  Inhalation 4 milliLiter(s) Inhalation every 6 hours  albuterol/ipratropium for Nebulization 3 milliLiter(s) Nebulizer every 6 hours  amLODIPine   Tablet 5 milliGRAM(s) Oral daily  artificial tears (preservative free) Ophthalmic Solution 1 Drop(s) Right EYE every 4 hours  baclofen 5 milliGRAM(s) Oral every 12 hours  chlorhexidine 0.12% Liquid 15 milliLiter(s) Oral Mucosa every 12 hours  chlorhexidine 2% Cloths 1 Application(s) Topical <User Schedule>  doxazosin 8 milliGRAM(s) Oral at bedtime  erythromycin   Ointment 1 Application(s) Right EYE at bedtime  fluticasone propionate 50 MICROgram(s)/spray Nasal Spray 1 Spray(s) Both Nostrils two times a day  heparin   Injectable 5000 Unit(s) SubCutaneous every 8 hours  levETIRAcetam 1500 milliGRAM(s) Oral two times a day  ofloxacin 0.3% Solution 1 Drop(s) Right EYE four times a day  pantoprazole  Injectable 40 milliGRAM(s) IV Push daily  petrolatum Ophthalmic Ointment 1 Application(s) Both EYES two times a day  phenytoin   Suspension 100 milliGRAM(s) Oral every 8 hours  propranolol 10 milliGRAM(s) Oral every 8 hours  sodium chloride 0.65% Nasal 1 Spray(s) Both Nostrils two times a day    MEDICATIONS  (PRN):  acetaminophen   Oral Liquid .. 650 milliGRAM(s) Oral every 6 hours PRN Temp greater or equal to 38.5C (101.3F), Mild Pain (1 - 3)      Allergies    Allergy Status Unknown    Intolerances        OBJECTIVE:  Vital Signs Last 24 Hrs  T(C): 36.9 (06 Jan 2025 09:02), Max: 37.5 (05 Jan 2025 21:25)  T(F): 98.5 (06 Jan 2025 09:02), Max: 99.5 (05 Jan 2025 21:25)  HR: 74 (06 Jan 2025 08:30) (70 - 82)  BP: 114/75 (06 Jan 2025 08:30) (102/55 - 116/71)  BP(mean): 90 (06 Jan 2025 08:30) (73 - 90)  RR: 18 (06 Jan 2025 08:30) (14 - 18)  SpO2: 100% (06 Jan 2025 08:30) (98% - 100%)    Parameters below as of 06 Jan 2025 08:30  Patient On (Oxygen Delivery Method): ventilator    O2 Concentration (%): 40  I&O's Summary    05 Jan 2025 07:01  -  06 Jan 2025 07:00  --------------------------------------------------------  IN: 1220 mL / OUT: 700 mL / NET: 520 mL    06 Jan 2025 07:01  -  06 Jan 2025 10:41  --------------------------------------------------------  IN: 0 mL / OUT: 250 mL / NET: -250 mL        PHYSICAL EXAM:  General: open touch to tactile stimuli, NAD, looking comfortable on vent.    HEENT: trach in place clean  Lungs: anterior, no crackles, no wheezes  Heart: regular  Abdomen: no distension, soft, no visible tenderness, + BS  Extremities:  Vuong in place, bag empty. Warm, no edema, not following commands     LABS:                        10.4   9.44  )-----------( 257      ( 06 Jan 2025 05:30 )             33.0     01-06    138  |  104  |  53[H]  ----------------------------<  108[H]  4.3   |  24  |  1.39[H]    Ca    9.5      06 Jan 2025 05:30  Phos  3.9     01-06  Mg     2.2     01-06          CAPILLARY BLOOD GLUCOSE        Urinalysis Basic - ( 06 Jan 2025 05:30 )    Color: x / Appearance: x / SG: x / pH: x  Gluc: 108 mg/dL / Ketone: x  / Bili: x / Urobili: x   Blood: x / Protein: x / Nitrite: x   Leuk Esterase: x / RBC: x / WBC x   Sq Epi: x / Non Sq Epi: x / Bacteria: x        MICRODATA:      RADIOLOGY/OTHER STUDIES:

## 2025-01-07 LAB
ANION GAP SERPL CALC-SCNC: 13 MMOL/L — SIGNIFICANT CHANGE UP (ref 5–17)
BUN SERPL-MCNC: 61 MG/DL — HIGH (ref 7–23)
CALCIUM SERPL-MCNC: 9.3 MG/DL — SIGNIFICANT CHANGE UP (ref 8.4–10.5)
CHLORIDE SERPL-SCNC: 104 MMOL/L — SIGNIFICANT CHANGE UP (ref 96–108)
CO2 SERPL-SCNC: 23 MMOL/L — SIGNIFICANT CHANGE UP (ref 22–31)
CREAT SERPL-MCNC: 1.53 MG/DL — HIGH (ref 0.5–1.3)
EGFR: 56 ML/MIN/1.73M2 — LOW
EGFR: 56 ML/MIN/1.73M2 — LOW
GLUCOSE SERPL-MCNC: 120 MG/DL — HIGH (ref 70–99)
HCT VFR BLD CALC: 33 % — LOW (ref 39–50)
HGB BLD-MCNC: 10.5 G/DL — LOW (ref 13–17)
MAGNESIUM SERPL-MCNC: 2.2 MG/DL — SIGNIFICANT CHANGE UP (ref 1.6–2.6)
MCHC RBC-ENTMCNC: 30.4 PG — SIGNIFICANT CHANGE UP (ref 27–34)
MCHC RBC-ENTMCNC: 31.8 G/DL — LOW (ref 32–36)
MCV RBC AUTO: 95.7 FL — SIGNIFICANT CHANGE UP (ref 80–100)
NRBC # BLD: 0 /100 WBCS — SIGNIFICANT CHANGE UP (ref 0–0)
NRBC BLD-RTO: 0 /100 WBCS — SIGNIFICANT CHANGE UP (ref 0–0)
PHOSPHATE SERPL-MCNC: 4.1 MG/DL — SIGNIFICANT CHANGE UP (ref 2.5–4.5)
PLATELET # BLD AUTO: 251 K/UL — SIGNIFICANT CHANGE UP (ref 150–400)
POTASSIUM SERPL-MCNC: 4.1 MMOL/L — SIGNIFICANT CHANGE UP (ref 3.5–5.3)
POTASSIUM SERPL-SCNC: 4.1 MMOL/L — SIGNIFICANT CHANGE UP (ref 3.5–5.3)
RBC # BLD: 3.45 M/UL — LOW (ref 4.2–5.8)
RBC # FLD: 14.6 % — HIGH (ref 10.3–14.5)
SODIUM SERPL-SCNC: 140 MMOL/L — SIGNIFICANT CHANGE UP (ref 135–145)
WBC # BLD: 7.15 K/UL — SIGNIFICANT CHANGE UP (ref 3.8–10.5)
WBC # FLD AUTO: 7.15 K/UL — SIGNIFICANT CHANGE UP (ref 3.8–10.5)

## 2025-01-07 PROCEDURE — 99233 SBSQ HOSP IP/OBS HIGH 50: CPT

## 2025-01-07 PROCEDURE — 99232 SBSQ HOSP IP/OBS MODERATE 35: CPT

## 2025-01-07 RX ORDER — SODIUM CHLORIDE 9 G/1000ML
1000 INJECTION, SOLUTION INTRAVENOUS ONCE
Refills: 0 | Status: DISCONTINUED | OUTPATIENT
Start: 2025-01-07 | End: 2025-01-07

## 2025-01-07 RX ORDER — SODIUM CHLORIDE 9 G/1000ML
1000 INJECTION, SOLUTION INTRAVENOUS ONCE
Refills: 0 | Status: COMPLETED | OUTPATIENT
Start: 2025-01-07 | End: 2025-01-07

## 2025-01-07 RX ADMIN — IPRATROPIUM BROMIDE AND ALBUTEROL SULFATE 3 MILLILITER(S): .5; 2.5 SOLUTION RESPIRATORY (INHALATION) at 17:47

## 2025-01-07 RX ADMIN — Medication 1 DROP(S): at 09:29

## 2025-01-07 RX ADMIN — BACLOFEN 5 MILLIGRAM(S): 10 INJECTION INTRATHECAL at 16:26

## 2025-01-07 RX ADMIN — Medication 40 MILLIGRAM(S): at 11:53

## 2025-01-07 RX ADMIN — ACETYLCYSTEINE 4 MILLILITER(S): 200 INHALANT RESPIRATORY (INHALATION) at 05:14

## 2025-01-07 RX ADMIN — Medication 1 DROP(S): at 02:05

## 2025-01-07 RX ADMIN — OFLOXACIN 1 DROP(S): 3 SOLUTION OPHTHALMIC at 17:46

## 2025-01-07 RX ADMIN — BACLOFEN 5 MILLIGRAM(S): 10 INJECTION INTRATHECAL at 05:14

## 2025-01-07 RX ADMIN — FLUTICASONE PROPIONATE 1 SPRAY(S): 50 SPRAY, METERED NASAL at 17:48

## 2025-01-07 RX ADMIN — Medication 100 MILLIGRAM(S): at 05:13

## 2025-01-07 RX ADMIN — Medication 1 APPLICATION(S): at 05:25

## 2025-01-07 RX ADMIN — OFLOXACIN 1 DROP(S): 3 SOLUTION OPHTHALMIC at 11:52

## 2025-01-07 RX ADMIN — Medication 1 SPRAY(S): at 17:48

## 2025-01-07 RX ADMIN — LEVETIRACETAM 1500 MILLIGRAM(S): 10 INJECTION, SOLUTION INTRAVENOUS at 05:13

## 2025-01-07 RX ADMIN — Medication 1 APPLICATION(S): at 05:24

## 2025-01-07 RX ADMIN — ACETYLCYSTEINE 4 MILLILITER(S): 200 INHALANT RESPIRATORY (INHALATION) at 23:03

## 2025-01-07 RX ADMIN — FLUTICASONE PROPIONATE 1 SPRAY(S): 50 SPRAY, METERED NASAL at 05:24

## 2025-01-07 RX ADMIN — Medication 15 MILLILITER(S): at 05:14

## 2025-01-07 RX ADMIN — Medication 100 MILLIGRAM(S): at 12:00

## 2025-01-07 RX ADMIN — HEPARIN SODIUM 5000 UNIT(S): 1000 INJECTION INTRAVENOUS; SUBCUTANEOUS at 14:22

## 2025-01-07 RX ADMIN — IPRATROPIUM BROMIDE AND ALBUTEROL SULFATE 3 MILLILITER(S): .5; 2.5 SOLUTION RESPIRATORY (INHALATION) at 11:53

## 2025-01-07 RX ADMIN — OFLOXACIN 1 DROP(S): 3 SOLUTION OPHTHALMIC at 23:04

## 2025-01-07 RX ADMIN — DOXAZOSIN MESYLATE 8 MILLIGRAM(S): 8 TABLET ORAL at 21:42

## 2025-01-07 RX ADMIN — SODIUM CHLORIDE 500 MILLILITER(S): 9 INJECTION, SOLUTION INTRAVENOUS at 10:52

## 2025-01-07 RX ADMIN — HEPARIN SODIUM 5000 UNIT(S): 1000 INJECTION INTRAVENOUS; SUBCUTANEOUS at 21:42

## 2025-01-07 RX ADMIN — ERYTHROMYCIN 1 APPLICATION(S): 5 OINTMENT OPHTHALMIC at 21:42

## 2025-01-07 RX ADMIN — Medication 1 APPLICATION(S): at 17:48

## 2025-01-07 RX ADMIN — AMLODIPINE BESYLATE 5 MILLIGRAM(S): 10 TABLET ORAL at 05:14

## 2025-01-07 RX ADMIN — ACETYLCYSTEINE 4 MILLILITER(S): 200 INHALANT RESPIRATORY (INHALATION) at 11:53

## 2025-01-07 RX ADMIN — Medication 100 MILLIGRAM(S): at 21:42

## 2025-01-07 RX ADMIN — Medication 1 DROP(S): at 21:42

## 2025-01-07 RX ADMIN — LEVETIRACETAM 1500 MILLIGRAM(S): 10 INJECTION, SOLUTION INTRAVENOUS at 17:46

## 2025-01-07 RX ADMIN — Medication 1 DROP(S): at 14:22

## 2025-01-07 RX ADMIN — HEPARIN SODIUM 5000 UNIT(S): 1000 INJECTION INTRAVENOUS; SUBCUTANEOUS at 05:14

## 2025-01-07 RX ADMIN — Medication 1 SPRAY(S): at 05:23

## 2025-01-07 RX ADMIN — Medication 15 MILLILITER(S): at 17:47

## 2025-01-07 RX ADMIN — Medication 1 DROP(S): at 17:47

## 2025-01-07 RX ADMIN — IPRATROPIUM BROMIDE AND ALBUTEROL SULFATE 3 MILLILITER(S): .5; 2.5 SOLUTION RESPIRATORY (INHALATION) at 05:13

## 2025-01-07 RX ADMIN — IPRATROPIUM BROMIDE AND ALBUTEROL SULFATE 3 MILLILITER(S): .5; 2.5 SOLUTION RESPIRATORY (INHALATION) at 23:03

## 2025-01-07 RX ADMIN — Medication 1 DROP(S): at 05:24

## 2025-01-07 RX ADMIN — OFLOXACIN 1 DROP(S): 3 SOLUTION OPHTHALMIC at 05:25

## 2025-01-07 RX ADMIN — ACETYLCYSTEINE 4 MILLILITER(S): 200 INHALANT RESPIRATORY (INHALATION) at 17:47

## 2025-01-07 NOTE — PROGRESS NOTE ADULT - ASSESSMENT
44M with unclear PMH (?stroke, MI) who was found down at work, intubated for airway protection and found to have acute parenchymal hemorrhage within nabil with mass effect (+ acute/subacute right cerebellar infarct) in setting of hypertensive emergency, transferred to [facility] for further neurosurgical care. Hospital course c/b poor neurologic recovery s/p trach-PEG, AUR s/p gabriel, ANIKA on CKD c/b hyperkalemia, HAP s/p amp-sulbactam (EOT 10/23), K aerogenes bacteremia  treated with 2 weeks of Cefepime per ID , On 11/15 he became septic and was transferred to NSICU due to increased o2 requirements and needed Vent support , Trach Cultures + for Stenotrophomonas which was treated with 7 days of Bactrim per ID , has been weaned of Vent since 11/23 and is now on 10lts at 40% o2 through Trach Collar. Stepped up to the NSICU on 12/15 for hypoxia and tachycardia. PE ruled out. On abx for 5 days. Unclear etiology. Now stepped down to 8Lach.    # Pontine hemorrhage  # Encephalopathy due to Intracranial Bleed   - Acute parenchymal hemorrhage within nabil with mass effect (+ acute/subacute right cerebellar infarct) in setting of hypertensive emergency.   - MRI brain also demonstrated acute/subacute R cerebellar stroke.   - No Neurosurgical Interventions were performed   - Secondary to IPH and cerebellar stroke patient has become Trach and Peg Dependent    # Hyponatremia (resolved)  - Continue to monitor Na    #Acute kidney injury  # Acute Tubular Necrosis superimposed on CKD stage III  - s/p US renal with chronic medical renal disease  - Agree with increasing Free water to 200 q6h, give 1L LR over 2 hours. Monitor UOP, bladder scan per protocol. Further management based on creatinine trend     # Seizures  - Continue Seizure precautions   - Continue  Keppra and Fosphenytoin     # Suspected Hospital Acquired Pneumonia ( Resolved)  - Pt had a CT Chest on 12/24 with evidence of Bilateral lower lobe infiltrates   - Pt completed the abx tx course of Cefepime on12/30.  -  Continue to monitor vent requirement, WBC and fever curve  - Care per nursing, chest PT     # Hypertension  - Continue amlodipine 5mg daily with holding parameters  - Will continue to monitor BP and titrate  antihypertensive as required     # Chronic respiratory failure.   - S/p percutaneous trach by pulm on 10/14/24  - Currently on Vent Support   - Continue Chest PT    # Neurogenic dysphagia.   - Pt s/p PEG with surgery 10/21/24  - TF per nutrition  - aspiration precautions and elevation of HOB.    #Acute urinary retention.   - doxazosin 8mg  - follow-up UOP per above     # Functional quadriplegia in setting of brainstem hemorrhage  - decub precautions  - care per nursing protocol     # DVT prop: Heparin SQ q8   Discussed with primary team,

## 2025-01-07 NOTE — PROGRESS NOTE ADULT - TIME BILLING
Bedside exam   Reviewed vitals, labs   Discussed patient's plan of care with primary team   Documentation of encounter

## 2025-01-07 NOTE — PROGRESS NOTE ADULT - SUBJECTIVE AND OBJECTIVE BOX
HPI:  43yo M, unknown past medical history (reported stroke and MI by coworkers) presented to [facility] with AMS, Pt was working at "[employer] when he was found down by coworkers. EMS called and pt brought to [facility] ED. Intubated, sedated, started on cardene for SBPs in 200s. CT head showed brain stem bleed. Transferred to NSICU for further management.  (30 Sep 2024 12:55)    Subjective: Unable to assess subjective data given pt's neurologic status.     HOSPITAL COURSE:  9/30: transferred from [facility]. A line placed. Versed dc'd. Cy Rader at bedside, states pt has family in [place], cannot confirm medications or PMH other than stroke and MI. 250cc bolus 3% given. LR switched to NS. hydralazine 25q8 started, 3% started, switched propofol to precedex   10/1: stability CTH done. Added labetalol, started TF. Palliative consulted. ethics consulted to determine surrogate. febrile 103, pan cx sent  10/2: BD 2, GEORGE overnight. TF resumed. Desatt'd to 80s, FiO2 inc. to 50. Fentanyl given, ABG, CXR ordered. Maxxed on precedex, started on propofol for DARIEN -4 - -5. Precedex dc'd. Duonebs, mucomyst, hypertonic added. 3% dc'd. Cardene dc'd. Start vanc/CTX. Increased labetalol 200q8. MRSA negative, dc'd vanc. ETT pulled back 2cm x 2, good positioning after confirmatory chest xray. Ethics attempting to establish HCP with family. Na 159, starting FW 250q6 for range 150-155.   10/3: BD3, GEORGE o/n, neuro stable. Na elevating, FW increased to 300q6. Dc'd bowel reg for diarrhea. vEEG started. SQH 5000q8 tonight.   10/4: BD 4, albumn bolus, incr. LR to 80 2/2 incr. in Cr, LR to 10 0cc/hr for uptrending Cr. Started 7% hypersal for 48hrs and SL atropine for inline/oral thick secretions. Dc'd CTX and started ancef for MSSA in the sputum. Nephrology consulted for CKD, f/u recs. SBP 170s, given hydralazine 10mg IVP.   10/5: BD5, o/n 10mg IVP hydralazine given for SBP 170s and started on hydralazine 25q8 via OGT. 10mg IV push labetalol for SBP > 160s. RT placed for diarrhea.   10/6: BD6, o/n FW increased to 350q4 per nephrology recs. IV tylenol for temp 100.6, SBp 160s presumed uncomfortable.   10/7: BD7, overnight pancultured for temp 101.8F.   10/8: BD8. GEORGE. Cr bumped. decreased LR to 75cc/hr. Adding simethicone ATC. incr hydralazine 50mgTID. Incr labetalol 300mgTID. Na 145, decreased FWF to 250q6. Start precedex. FENa consistent with intrinsic kidney injury. Pend repeat renal US. Retaining up to 1.3L, bladder scans q6, straight cath PRN  10/9: BD 9. GEORGE overnight. Neuro stable. abd xray for distention w non-specific gas pattern, OGT to LIWS for morning. duonebs/mucomyst to q8 for improving secretions. Changed tube feeds to Jevity 1.5 20cc/hr, low rate due to abdominal distention, nepro dense and more difficult to digest. Tolerating CPAP, confirmed by ABG.   10/10: BD 10. GEORGE overnight. Neuro stable. (+) gabriel for urinary retention on bladder scan. inc TF to goal rate of 40cc/hr. family leaning toward pursuing trach/PEG. 1/2 amp for FS 81.   10/11: BD 11. GEORGE overnight. Neuro stable. Trach/PEG consults placed.   10/12: BD 12. GEORGE overnight. Neuro stable. MRI brain complete.   10/13: BD 13. Increase flomax. Hold SQH after PM dose for trach tm. IVL.   10/14: BD 14. GEORGE overnight, remains on AC/VC. Gabriel placed for urinary retention. Dc'd free water.  S/p trach with pulm. NGT placed and CXR confirmed in good position.   10/15: BD 15, GEORGE ovn. resumed feeds. spiked 101, pan cx sent.   10/16: BD 16. GEORGE ovn. Lokelma 5mg for K+ 5.4. Started vanc q 24/zosyn for empiric PNA coverage, IVF to 100/hr. PEG held for fever.   10/17: BD 17,  ordered serum osm and urine osm for am. Started sinemet for neurostimulation. Increased cardura to 0.8. Started FW 100q4, dc'd IVF. MRSA negative, dc'd vanc. NGT replaced d/t coiling.   10/18: BD 18, GEORGE overnight, neuro stable. Amantadine added for neurostim. zosyn changed to unasyn for acinetobacter baumannii, failed TOV and required SC  10/19: BD 19, GEORGE ovn. cardura 2mg added for retention. labetalol decreased 200q8, hydralazine decreased 25q8. Gabriel replaced.   10/20: BD20, GEORGE overnight. NGT dislodged, replaced. PEG tomorrow w/ gen surg, FW increased to 150q4 and labetalol decreased to 100q8, lokelma given for hyperkalemia.   10/21: BD 21. POD0 PEG placement with Gen surg. decr labetolol to 50q8, incr. cardura to 0.4, started lokelma and phoslo, dc gabriel POD0 PEG placement with Gen surg.  10/22: BD 22. Plan to start TF today via PEG. dc labetalol, Following ophtho recs. Increased apnea settings - found to be in cheyne-moe respiration. CPAP 5/5.  10/23: BD 23. hydralazine d/c'd, trach collar trial today. Rectal tube placed at 6am.  10/24: BD24, o/n lokelma held due to diarrhea. Free water 100q6 resumed. dc'd tamsulosin, amantadine. Incr'd cardura to 8mg qhs. Dc'd FW. Switched jevity to nepro. gabriel placed for high urine output. Started SL atropine for oral secretions. Dc'd free water.  10/25: BD25, o/n decreased suctioning requirements to > q4hrs, GEORGE. Cr improving, cont phoslo, lokelma held at this time. Gabriel placed yest, cont. Tolerating trach collar. Given 500cc plasmalyte bolus for ANIKA. Dc'd sinemet.   10/26: BD26, o/n resumed lokelma 5mg daily and resumed 100cc free water q6hrs. Change in neuro status with new right pupillary dilation with anisocoria (right pupil 6mm fixed and left pupil 3mm briskly reactive). Given 23.4% NaCl bullet, taken for emergent CTH showing mostly resolved pontine hemorrhage, continued brainstem hypodensity likely edema d/t hemorrhage, no new hemorrhage or infarct, no herniation, mild increase in size of left lateral ventricle. Vitals remaine stable. Na goal > 140.   10/27: BD27, o/n GEORGE.Neuro stable. Pend stepdown with airway bed.   10/28: BD 28. GEORGE overnight. Neuro stable. Miralax ordered. Gabriel removed, pending TOV.  10/29: BD 29. GEORGE o/n. Given 2L NS over 8 hrs for increased BUN/Cr ratio. Gabriel placed for frequent straight cath.   10/30: BD 30.   10/31: BD 31. GEORGE overnight. Na 149, increased free water to 200q6. 1L NS for uptrending BUN.   11/1: BD 32. GEORGE overnight. Given 1L NS for dehydration. Na 146, increased FW to 250q6.   11/2: BD 33 GEORGE overnight, neuro stable, given 500cc bolus for net negative status and tachycardia   11/3: BD 34, GEORGE overnight, neuro stable. Patient remains tachycardic, EKG showing sinus tachycardia, given additional 500cc NS bolus. Febrile to 101.9F, pan cultured (without UA), CXR WNL, given tylenol.   11/4: BD 35, GEORGE overnight, neuro stable. Given 1L NS for tachycardia. sputum (+) for stenotropohomas maltophilia.   11/5: BD 36 GEORGE overnight, neuro stable. Vancomycin dc'd. Chest PT BID. ID consulted, cont zosyn.  11/6: BD 37. blood cx + klebsiella dc zosyn changed to cefepime, CTAP ordered, rpt blood cx sent.    11/7: BD 38. Pending CT A/P, given 250cc bolus and starting maintenance fluids overnight. Pending CT A/P after bolus   11/8: BD 39. CT CAP negative for infection.   11/9: BD 40. GEORGE overnight.  11/10: BD 41. GEORGE overnight. desat to 85 on trach collar, O2 inc to 10L and 100%, O2 sat inc to 95. pt tachy to 110s, euvolemic. given tylenol. ABG and CXR ordered. spiked fever, pancultured, RVP negative. AM ABG w pO2 79, rpt w pO2 79. pt appears comfortable, satting 94%.   11/11: BD 42. GEORGE overnight. pt became tachy to 130s, desat to 90 on 100% FiO2 and 10L. suctioned, (+) productive cough. temp 101.4, given 1g IV tylenol and 500cc NS bolus for euvolemia. fever and HR downtrending. LE dopplers negative for dvt  11/12: BD 43, GEORGE ovn, fever and HR downtrending, satting 97% 70% FIO2  11/13: BD 44, GEORGE ovn. started standing tylenol x24 hours for tachycardia. desat to 80s, o2 increased. CXR stable, pending CTA PE protocol.   11/14: BD 45, GEORGE overnight, neuro stable. resp therapy dec FiO2 to 70%.   11/15: BD 46, GEORGE overnight, neuro stable.  Rapid called for desaturation 30s, tachycardic 140s. Patient bagged, 100% fio2, heavily suctioned. CXR/POCUS unremarkable. ABG c/w desaturation. WBC 14.71. Afebrile. O2 improved to 90s and patient upgraded to ICU. ABG paO2 30s improved to 89 on vent. IV Tylenol x 1, sputum sent. Start protonix while o-n vent.   11/16: BD 47. POCUS showed collapsable IVCF, given 1L bolus. Vanco/Cefpime added empriic for PNA, NGT feeds restarted. MRSA swab neg, Vanc DC'd.   11/17: BD 48. GEORGE overnight. 1l bolus for tachycardia. Spiked to 101, cultured. 500cc bolus for tachycardia, tachy to 148 given 25mcg fentanyl, 250cc albumin, 1.5L bolus. 5 IV lopressor with response HR to 100s. +Stenotrophomonas on sputum cx.   11/18: BD 49. GEORGE overnight. Consulted ID, cefepime switched to bactrim x7days. Started hydrochlorothiazine 12.5mg daily.  11/19: BD 50. Tachy 120s, given tylenol and 500cc NS. Tolerating 5/5, switched to TCx. Holding phos binder. D/c Bactrim. D/c gabriel, f/u TOV. Dc'd PPI.   11/20: BD 51. GEORGE ovn. 1600 satting low 90s, mildly tachy to 110s, afebrile, RR wnl. O2 improved to mid 90s while inc O2 to 100% on TCx. CPAP 5/5 placed back on.  11/21: BD 52, GEORGE ovn, tolerating CPAP 5/5. Switch to trach collar during the day if tolerating well. HCTZ held for Cr bump, straight cath frequence increased to q4  11/22: BD 53, GEORGE ovn. Resumed phoslo. Gabriel placed. Resumed HCTZ.   11/23: BD 54. Holding tylenol in setting of possible fever, will require pan cx if febrile. Cr improved today. Cont CPAP. Bowel regimen held i/s/o diarrhea. FOBT negative.  11/24: BD 55. GEORGE overnight. Neuro stable. HCTZ dc'ed, started lisinopril 5. Lokelma dc'ed for K 3.7.   11/25: BD 56, GEORGE overnight. Neuro stable. dc'd lisinopril 5mg. Gabriel dc'd. TOV. 1545 noted to be hypotensive, MAP 50, in supine position on chair, HR 60s, afebrile, O2 96%. Given 1L cc NS bolus, placed back on bed in reverse trendelenberg, improved to map of 66. Neostick at bedside. Vitals check q1h. Dc'ed amlodipine. Failed TOV, bladder scan q6, sc prn. Added back Senna.   11/26: BD 57, GEORGE overnight. Neuro stable. Dc'd phoslo.   11/27: BD 58, GEORGE overnight. Neuro stable.    11/28: BD 59. Gabriel replaced.   11/29: GEORGE.  11/30: GEORGE, neuro stable.   12/1: BD 62, GEORGE overnight  12/2: BD 63, GEORGE overnight.; Given 1L bolus of LR for uptrending BUN/Cr.  12/3: BD 64. Reinstated eye gtt/moisture chamber given increased Rt eye injection  12/4: BD 65. GEORGE overnight. Attempted to speak with ophtho regarding eyelid weight/closure but no answer, full mailbox.   12/5: BD 66, GEORGE overnight, bowel regimen increased and had BM.   12/6: BD 67, GEORGE overnight, neuro stable.  12/7: GEORGE overnight, neuro stable. /110s, given x1 hydralazine 10 mg IVP. Restarted home amlodipine 5mg.  12/8: GEORGE. OOB to chair.     12/11: GEORGE, mucomyst added for thick secretions, simethicone for abd distension, abd xray with stool burden, increased bowel regimen.   12/12: GEORGE overnight.   12/13: GEORGE overnight.   12/14: GEORGE overnight  12/15: o/n Patient became tachycardic HR 120s and 10 minutes later O2 sat dropped as low as 89%. Patient suctioned without improvement in O2 sat and tube feeds found in suction catheter. TFs held.  STAT CXR ordered. STAT labs sent. Respiratory therapist called to bedside and patient trach connected to ventilator. After connection to ventilator and further suctioning O2 sat improved to 97% but patient HR remains 120-130s. Upgraded to NSICU for further management. Vancomycin and zosyn started. CTA  chest PE protocol and CTH ordered. Blood cultures sent.given 500cc bolus, rpt ABG sent pO2 243, CTH and CTA chest done. FS while NPO, FiO2 dec 50 pending ABG. sputum cx positive for few GPC and GNR.   12/16: GEORGE overnight, restarted amlodipine, troponin 75   12/17: GEORGE overnight, neuro stable. Attempted CPAP this morning, did not tolerate and back on full vent support. Dc'd Vanc. Tachycardia to 140s, noted extremities to be twitching along with jaw twitching. Given 2g of Keppra, total 4mg ativan and placed on EEG, full set of labs and lactate negative. Resumed trickle feeds at 20cc/hr. Given 250cc albumin for tachycardia.   12/18: GEORGE overnight. Neuro stable. CTH stable. EEG negative and dc'd.   12/19: GEORGE overnight. neuro stable. SIMV most of day, AC/VC at night.   12/20: GEORGE overnight, neuro stable. remains on VC/AC  12/21: GEORGE ovn. 1L bolus for tachy to 120s-130s.   12/22: GEORGE ovn. Tachy to 120s, given tylenol and IVF. 2mg IV ativan given for L jaw twitching. Sx resolved for 3 minutes and started again. Epilepsy contacted. Given 2mg IV ativan. Continued twitching. Increased keppra to 1500mg BID, 2mg ativan given. Propranolol started for refractory tachycardia. vEEG ordered. albumin given. POCUS performed, no b lines. Started on fosphenytoin, loaded w 20mg/kg then 100mg TID. febrile, pancx, started cefepime 2g q8, vancomycin  12/23: GEORGE ovn. +focal motor seizures on eeg. ID consulted. Vancomycin dc'ed as per ID.  12/24: GEORGE ovn, neuro stable. Baclofen 5 mg q12 started for hiccups. Na 129 from 134. Urine lytes c/w SIADH. Given 250cc 3% bolus. CT chest for infection w/u - f/u read. Repeat Na 136. UA negative  12/25: GEORGE ovn.   12/26: GEORGE ovn  12/27: GEORGE overnight. Blood cx neg @ 4 days, DC cefepime per medicine (sputum colonized).   12/28: Desaturation o/n to 80's, CXR obtained, pulse ox changed and sats resolved. Na 133 from 138, 250cc 3% bolus given. Cefepime resumed until 12/30 per ID. Rpt Na 138.  12/29: GEROGE overnight. Na stable.   12/30: GEORGE overnight. Family meeting with son, pt now DNR.   12/31: GEORGE overnight.   1/1: GEORGE overnight, neuro stable.  1/2: GEORGE overnight, neuro stable. FW decreased to 100q6.   1/3: GEORGE overnight, neuro stable. fosphenytoin IV changed to pheytoin PO via PEG per epilepsy recommendations  1/4: GEORGE overnight, neuro stable. FW incr. to 100q4  1/5: GEORGE overnight, neuro stable.   1/6: GEORGE overnight, neuro stable   1/7: GEORGE overnight, neuro stable  Vital Signs Last 24 Hrs  T(C): 37.2 (06 Jan 2025 22:00), Max: 37.3 (06 Jan 2025 18:00)  T(F): 98.9 (06 Jan 2025 22:00), Max: 99.1 (06 Jan 2025 18:00)  HR: 78 (06 Jan 2025 23:57) (74 - 90)  BP: 117/74 (06 Jan 2025 23:57) (108/71 - 118/79)  BP(mean): 91 (06 Jan 2025 23:57) (83 - 94)  RR: 16 (06 Jan 2025 23:57) (16 - 18)  SpO2: 99% (06 Jan 2025 23:57) (98% - 100%)    Parameters below as of 06 Jan 2025 23:57  Patient On (Oxygen Delivery Method): ventilator    O2 Concentration (%): 40    I&O's Summary    05 Jan 2025 07:01  -  06 Jan 2025 07:00  --------------------------------------------------------  IN: 1220 mL / OUT: 700 mL / NET: 520 mL    06 Jan 2025 07:01  -  07 Jan 2025 01:32  --------------------------------------------------------  IN: 740 mL / OUT: 590 mL / NET: 150 mL        PHYSICAL EXAM:  Constitutional: NAD, laying in bed  Respiratory: breathing non-labored, symmetrical chest wall movement, trache to full vent support  Cardiovascuar: RRR, no murmurs  Gastrointestinal: abdomen soft, non tender, PEG tube in place  Genitourinary: gabriel in place  Neurological:  Awake. Pupils 4mm and briskly reactive to light. Tracks in the vertical plane.   Face symmetric.   Hand  in RUE 1/5 otherwise RUE 0/5  LUE 0/5  bilateral lower extremities withdraw to noxious stimuli      TUBES/LINES:  [x] Gabriel  [] Wound Drains  [] Others      DIET:  [] NPO  [] Mechanical  [x] Tube feeds    LABS:                        10.4   9.44  )-----------( 257      ( 06 Jan 2025 05:30 )             33.0     01-06    138  |  104  |  53[H]  ----------------------------<  108[H]  4.3   |  24  |  1.39[H]    Ca    9.5      06 Jan 2025 05:30  Phos  3.9     01-06  Mg     2.2     01-06        Urinalysis Basic - ( 06 Jan 2025 05:30 )    Color: x / Appearance: x / SG: x / pH: x  Gluc: 108 mg/dL / Ketone: x  / Bili: x / Urobili: x   Blood: x / Protein: x / Nitrite: x   Leuk Esterase: x / RBC: x / WBC x   Sq Epi: x / Non Sq Epi: x / Bacteria: x    Allergies    Allergy Status Unknown    Intolerances      MEDICATIONS:  Antibiotics:    Neuro:  acetaminophen   Oral Liquid .. 650 milliGRAM(s) Oral every 6 hours PRN  baclofen 5 milliGRAM(s) Oral every 12 hours  levETIRAcetam 1500 milliGRAM(s) Oral two times a day  phenytoin   Suspension 100 milliGRAM(s) Oral every 8 hours    Anticoagulation:  heparin   Injectable 5000 Unit(s) SubCutaneous every 8 hours    OTHER:  acetylcysteine 10%  Inhalation 4 milliLiter(s) Inhalation every 6 hours  albuterol/ipratropium for Nebulization 3 milliLiter(s) Nebulizer every 6 hours  amLODIPine   Tablet 5 milliGRAM(s) Oral daily  artificial tears (preservative free) Ophthalmic Solution 1 Drop(s) Right EYE every 4 hours  chlorhexidine 0.12% Liquid 15 milliLiter(s) Oral Mucosa every 12 hours  chlorhexidine 2% Cloths 1 Application(s) Topical <User Schedule>  doxazosin 8 milliGRAM(s) Oral at bedtime  erythromycin   Ointment 1 Application(s) Right EYE at bedtime  fluticasone propionate 50 MICROgram(s)/spray Nasal Spray 1 Spray(s) Both Nostrils two times a day  ofloxacin 0.3% Solution 1 Drop(s) Right EYE four times a day  pantoprazole  Injectable 40 milliGRAM(s) IV Push daily  petrolatum Ophthalmic Ointment 1 Application(s) Both EYES two times a day  propranolol 10 milliGRAM(s) Oral every 8 hours  sodium chloride 0.65% Nasal 1 Spray(s) Both Nostrils two times a day    IVF:    CULTURES:  Culture Results:   Numerous Klebsiella aerogenes (Previously Enterobacter)  Numerous Stenotrophomonas maltophilia  Commensal iram consistent with body site (12-25 @ 20:05)    RADIOLOGY & ADDITIONAL TESTS:      ASSESSMENT:  44M PMH ?stroke/MI present to [facility] after collapsing at work. Decorticate posturing, vomiting, intubated for airway protection. 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 Transferred to [facility] for further management. s/p trach 10/14. s/p peg 10/21. Re-upgrade to ICU 2/2 desaturation event and suctioning requirements 11/15. Re-upgrade to NSICU 12/15 2/2 desaturation and tachycardia.    Neuro:  - neuro/vitals q4h  - pain control: tylenol prn  - seizure tx: keppra 1500mg BID, phenytoin 100mg PO TID  - vEEG (10/3-4) negative, (10/17-10/19) negative, (12/17-12/18) negative, (12/22-12/25 ) +focal motor seizures not correlating w/ EEG  - epilepsy following  - CTH 9/30: enlarged pontine hemorrhage, CTH 10/3: stable, CTH 10/25: mostly resolved pontine hemorrhage, CTH 12/15: R mastoid air cell opacification; acute otitis media vs sterile effusion, CTH 12/18: stable.  - MRI brain 10/12: parenchymal hemorrhage, acute/subacute R cerebellar stroke    - stroke neuro signed off    CV:  - -160  - tachycardia: propranolol 10mg q8  - HTN: amlodipine 5mg  - echo (9/30) EF 75%, repeat 12/16: 57%    Resp:  - trach to vent, AC/VC 40/400/14/6  - CTA PE protocol 12/15 negative   - Secretions: duonebs/mucomyst/chest PT q6h   - CT Chest 12/24 for infectious w/u: b/l LL atelectasis and opacities     GI:  - TF via PEG (placed 10/21 by gen surg)  - bowel regimen held for loose stool, last BM 1/5  - baclofen 5q12 for hiccups     Renal:  - IVL, FW 100q4 for hydration   - Urinary retenion: Gabriel replaced 12/15  - CKD: trend BUN/Cr  - renal US 10/1: echogenicity c/w chronic med renal dz, repeat 10/8: inc renal echogeicity, c/f medical renal disease w increased hydro     Endo:  - A1c 5.4    Heme:  - DVT ppx: SCDs, SQH 5000u q8h   - LE dopplers negative 12/16    ID:  - last pan cx 12/22  - empiric PNA: cefepime (12/22-12/30), s/p vanc (12/22-12/23), s/p zosyn (12/15-12/20), s/p vanc (12/15-12/16)  - s/p Stenotrophomonas maltophilia PNA: s/p Bactrim (11/18-11/19) s/p Cefepime (11/16-11/18)  - 11/3, (+) sputum for stenotrophomas maltophlia, blood cx (+) klebsiella, cefepime 2gq12 (11/6 - 11/12)   - empiric tx: s/p zosyn (11/3-11/6) , s/p vanc  (11/3-11/5)  - S/p Ancef (10/4-10/14) for PNA, and s/p Unasyn (10/18-10/23) +actinobacter baumanii     MISC:  - ophtho consult for keratitis  - erythromycin ointment R eye q4hrs, ofloxacin ointment R eye QID, artificial tears R eye q2hrs, moisture chamber at bedtime    Dispo: SDU status, DNR, pending PRUCOL for benefits     D/w Dr. D'Amico

## 2025-01-07 NOTE — PROGRESS NOTE ADULT - SUBJECTIVE AND OBJECTIVE BOX
SUBJECTIVE:  Patient was seen and examined at bedside. Patient looking comfortable, no labored breathing on Ventilator. Vuong removed yesterday. No other complaints or events reported.    Review of systems: unable to obtain    Tube Feed:   Tube Feeding Modality: Gastrostomy  Maribeth Sauceda Renal Support 1.8  Total Volume for 24 Hours (mL): 1080  Total Number of Cans: 5  Continuous  Starting Tube Feed Rate mL per Hour: 30  Increase Tube Feed Rate by (mL): 10     Every 4 hours  Until Goal Tube Feed Rate (mL per Hour): 60  Tube Feed Duration (in Hours): 18  Tube Feed Start Time: 10:00  Tube Feed Stop Time: 04:00  Banatrol TF     Qty per Day:  2 (12-18-24 @ 09:33) [Active]      MEDICATIONS:  MEDICATIONS  (STANDING):  acetylcysteine 10%  Inhalation 4 milliLiter(s) Inhalation every 6 hours  albuterol/ipratropium for Nebulization 3 milliLiter(s) Nebulizer every 6 hours  amLODIPine   Tablet 5 milliGRAM(s) Oral daily  artificial tears (preservative free) Ophthalmic Solution 1 Drop(s) Right EYE every 4 hours  baclofen 5 milliGRAM(s) Oral every 12 hours  chlorhexidine 0.12% Liquid 15 milliLiter(s) Oral Mucosa every 12 hours  chlorhexidine 2% Cloths 1 Application(s) Topical <User Schedule>  doxazosin 8 milliGRAM(s) Oral at bedtime  erythromycin   Ointment 1 Application(s) Right EYE at bedtime  fluticasone propionate 50 MICROgram(s)/spray Nasal Spray 1 Spray(s) Both Nostrils two times a day  heparin   Injectable 5000 Unit(s) SubCutaneous every 8 hours  levETIRAcetam 1500 milliGRAM(s) Oral two times a day  ofloxacin 0.3% Solution 1 Drop(s) Right EYE four times a day  pantoprazole  Injectable 40 milliGRAM(s) IV Push daily  petrolatum Ophthalmic Ointment 1 Application(s) Both EYES two times a day  phenytoin   Suspension 100 milliGRAM(s) Oral every 8 hours  propranolol 10 milliGRAM(s) Oral every 8 hours  sodium chloride 0.65% Nasal 1 Spray(s) Both Nostrils two times a day    MEDICATIONS  (PRN):  acetaminophen   Oral Liquid .. 650 milliGRAM(s) Oral every 6 hours PRN Temp greater or equal to 38.5C (101.3F), Mild Pain (1 - 3)      Allergies    Allergy Status Unknown    Intolerances        OBJECTIVE:  Vital Signs Last 24 Hrs  T(C): 36.4 (07 Jan 2025 09:09), Max: 37.3 (06 Jan 2025 18:00)  T(F): 97.5 (07 Jan 2025 09:09), Max: 99.1 (06 Jan 2025 18:00)  HR: 66 (07 Jan 2025 08:28) (66 - 90)  BP: 122/83 (07 Jan 2025 08:26) (108/71 - 122/83)  BP(mean): 98 (07 Jan 2025 08:26) (83 - 98)  RR: 14 (07 Jan 2025 08:28) (14 - 18)  SpO2: 100% (07 Jan 2025 08:28) (98% - 100%)    Parameters below as of 07 Jan 2025 08:28  Patient On (Oxygen Delivery Method): ventilator    O2 Concentration (%): 40  I&O's Summary    06 Jan 2025 07:01  -  07 Jan 2025 07:00  --------------------------------------------------------  IN: 1240 mL / OUT: 990 mL / NET: 250 mL    07 Jan 2025 07:01  -  07 Jan 2025 11:49  --------------------------------------------------------  IN: 0 mL / OUT: 275 mL / NET: -275 mL        PHYSICAL EXAM:  General: open eyes to gentle touch, NAD, looking comfortable on vent.    HEENT: trach in place clean  Lungs: anterior, no crackles, no wheezes  Heart: regular  Abdomen: no distension, soft, no visible tenderness, + BS  Extremities:  Bag empty. Warm, no edema, no visible tenderness, not following commands       LABS:                        10.5   7.15  )-----------( 251      ( 07 Jan 2025 07:38 )             33.0     01-07    140  |  104  |  61[H]  ----------------------------<  120[H]  4.1   |  23  |  1.53[H]    Ca    9.3      07 Jan 2025 07:38  Phos  4.1     01-07  Mg     2.2     01-07          CAPILLARY BLOOD GLUCOSE        Urinalysis Basic - ( 07 Jan 2025 07:38 )    Color: x / Appearance: x / SG: x / pH: x  Gluc: 120 mg/dL / Ketone: x  / Bili: x / Urobili: x   Blood: x / Protein: x / Nitrite: x   Leuk Esterase: x / RBC: x / WBC x   Sq Epi: x / Non Sq Epi: x / Bacteria: x        MICRODATA:      RADIOLOGY/OTHER STUDIES:

## 2025-01-08 LAB
ANION GAP SERPL CALC-SCNC: 13 MMOL/L — SIGNIFICANT CHANGE UP (ref 5–17)
BUN SERPL-MCNC: 59 MG/DL — HIGH (ref 7–23)
CALCIUM SERPL-MCNC: 9.6 MG/DL — SIGNIFICANT CHANGE UP (ref 8.4–10.5)
CHLORIDE SERPL-SCNC: 104 MMOL/L — SIGNIFICANT CHANGE UP (ref 96–108)
CO2 SERPL-SCNC: 23 MMOL/L — SIGNIFICANT CHANGE UP (ref 22–31)
CREAT SERPL-MCNC: 1.35 MG/DL — HIGH (ref 0.5–1.3)
EGFR: 66 ML/MIN/1.73M2 — SIGNIFICANT CHANGE UP
EGFR: 66 ML/MIN/1.73M2 — SIGNIFICANT CHANGE UP
GLUCOSE SERPL-MCNC: 124 MG/DL — HIGH (ref 70–99)
HCT VFR BLD CALC: 30.4 % — LOW (ref 39–50)
HGB BLD-MCNC: 9.9 G/DL — LOW (ref 13–17)
MAGNESIUM SERPL-MCNC: 2 MG/DL — SIGNIFICANT CHANGE UP (ref 1.6–2.6)
MCHC RBC-ENTMCNC: 31.1 PG — SIGNIFICANT CHANGE UP (ref 27–34)
MCHC RBC-ENTMCNC: 32.6 G/DL — SIGNIFICANT CHANGE UP (ref 32–36)
MCV RBC AUTO: 95.6 FL — SIGNIFICANT CHANGE UP (ref 80–100)
NRBC # BLD: 0 /100 WBCS — SIGNIFICANT CHANGE UP (ref 0–0)
NRBC BLD-RTO: 0 /100 WBCS — SIGNIFICANT CHANGE UP (ref 0–0)
PHOSPHATE SERPL-MCNC: 3.7 MG/DL — SIGNIFICANT CHANGE UP (ref 2.5–4.5)
PLATELET # BLD AUTO: 232 K/UL — SIGNIFICANT CHANGE UP (ref 150–400)
POTASSIUM SERPL-MCNC: 3.6 MMOL/L — SIGNIFICANT CHANGE UP (ref 3.5–5.3)
POTASSIUM SERPL-SCNC: 3.6 MMOL/L — SIGNIFICANT CHANGE UP (ref 3.5–5.3)
RBC # BLD: 3.18 M/UL — LOW (ref 4.2–5.8)
RBC # FLD: 14.2 % — SIGNIFICANT CHANGE UP (ref 10.3–14.5)
SODIUM SERPL-SCNC: 140 MMOL/L — SIGNIFICANT CHANGE UP (ref 135–145)
WBC # BLD: 7.08 K/UL — SIGNIFICANT CHANGE UP (ref 3.8–10.5)
WBC # FLD AUTO: 7.08 K/UL — SIGNIFICANT CHANGE UP (ref 3.8–10.5)

## 2025-01-08 PROCEDURE — 99233 SBSQ HOSP IP/OBS HIGH 50: CPT

## 2025-01-08 PROCEDURE — 99231 SBSQ HOSP IP/OBS SF/LOW 25: CPT

## 2025-01-08 RX ORDER — INFLUENZA A VIRUS A/IDAHO/07/2018 (H1N1) ANTIGEN (MDCK CELL DERIVED, PROPIOLACTONE INACTIVATED, INFLUENZA A VIRUS A/INDIANA/08/2018 (H3N2) ANTIGEN (MDCK CELL DERIVED, PROPIOLACTONE INACTIVATED), INFLUENZA B VIRUS B/SINGAPORE/INFTT-16-0610/2016 ANTIGEN (MDCK CELL DERIVED, PROPIOLACTONE INACTIVATED), INFLUENZA B VIRUS B/IOWA/06/2017 ANTIGEN (MDCK CELL DERIVED, PROPIOLACTONE INACTIVATED) 15; 15; 15; 15 UG/.5ML; UG/.5ML; UG/.5ML; UG/.5ML
0.5 INJECTION, SUSPENSION INTRAMUSCULAR ONCE
Refills: 0 | Status: DISCONTINUED | OUTPATIENT
Start: 2025-01-08 | End: 2025-03-13

## 2025-01-08 RX ADMIN — IPRATROPIUM BROMIDE AND ALBUTEROL SULFATE 3 MILLILITER(S): .5; 2.5 SOLUTION RESPIRATORY (INHALATION) at 05:07

## 2025-01-08 RX ADMIN — Medication 1 DROP(S): at 14:57

## 2025-01-08 RX ADMIN — HEPARIN SODIUM 5000 UNIT(S): 1000 INJECTION INTRAVENOUS; SUBCUTANEOUS at 21:45

## 2025-01-08 RX ADMIN — Medication 100 MILLIGRAM(S): at 05:07

## 2025-01-08 RX ADMIN — OFLOXACIN 1 DROP(S): 3 SOLUTION OPHTHALMIC at 05:08

## 2025-01-08 RX ADMIN — FLUTICASONE PROPIONATE 1 SPRAY(S): 50 SPRAY, METERED NASAL at 17:24

## 2025-01-08 RX ADMIN — Medication 100 MILLIGRAM(S): at 12:39

## 2025-01-08 RX ADMIN — BACLOFEN 5 MILLIGRAM(S): 10 INJECTION INTRATHECAL at 05:08

## 2025-01-08 RX ADMIN — BACLOFEN 5 MILLIGRAM(S): 10 INJECTION INTRATHECAL at 17:24

## 2025-01-08 RX ADMIN — HEPARIN SODIUM 5000 UNIT(S): 1000 INJECTION INTRAVENOUS; SUBCUTANEOUS at 05:07

## 2025-01-08 RX ADMIN — Medication 40 MILLIEQUIVALENT(S): at 07:26

## 2025-01-08 RX ADMIN — IPRATROPIUM BROMIDE AND ALBUTEROL SULFATE 3 MILLILITER(S): .5; 2.5 SOLUTION RESPIRATORY (INHALATION) at 12:39

## 2025-01-08 RX ADMIN — LEVETIRACETAM 1500 MILLIGRAM(S): 10 INJECTION, SOLUTION INTRAVENOUS at 17:24

## 2025-01-08 RX ADMIN — Medication 1 DROP(S): at 09:26

## 2025-01-08 RX ADMIN — ACETYLCYSTEINE 4 MILLILITER(S): 200 INHALANT RESPIRATORY (INHALATION) at 05:08

## 2025-01-08 RX ADMIN — Medication 650 MILLIGRAM(S): at 10:00

## 2025-01-08 RX ADMIN — Medication 650 MILLIGRAM(S): at 09:25

## 2025-01-08 RX ADMIN — ERYTHROMYCIN 1 APPLICATION(S): 5 OINTMENT OPHTHALMIC at 21:45

## 2025-01-08 RX ADMIN — AMLODIPINE BESYLATE 5 MILLIGRAM(S): 10 TABLET ORAL at 05:08

## 2025-01-08 RX ADMIN — OFLOXACIN 1 DROP(S): 3 SOLUTION OPHTHALMIC at 17:24

## 2025-01-08 RX ADMIN — Medication 1 APPLICATION(S): at 05:09

## 2025-01-08 RX ADMIN — Medication 15 MILLILITER(S): at 17:24

## 2025-01-08 RX ADMIN — DOXAZOSIN MESYLATE 8 MILLIGRAM(S): 8 TABLET ORAL at 21:45

## 2025-01-08 RX ADMIN — Medication 1 SPRAY(S): at 17:25

## 2025-01-08 RX ADMIN — Medication 1 APPLICATION(S): at 05:10

## 2025-01-08 RX ADMIN — OFLOXACIN 1 DROP(S): 3 SOLUTION OPHTHALMIC at 12:38

## 2025-01-08 RX ADMIN — Medication 1 DROP(S): at 21:45

## 2025-01-08 RX ADMIN — FLUTICASONE PROPIONATE 1 SPRAY(S): 50 SPRAY, METERED NASAL at 05:08

## 2025-01-08 RX ADMIN — Medication 1 DROP(S): at 17:25

## 2025-01-08 RX ADMIN — ACETYLCYSTEINE 4 MILLILITER(S): 200 INHALANT RESPIRATORY (INHALATION) at 12:38

## 2025-01-08 RX ADMIN — Medication 1 DROP(S): at 05:08

## 2025-01-08 RX ADMIN — LEVETIRACETAM 1500 MILLIGRAM(S): 10 INJECTION, SOLUTION INTRAVENOUS at 05:07

## 2025-01-08 RX ADMIN — Medication 1 SPRAY(S): at 05:09

## 2025-01-08 RX ADMIN — Medication 15 MILLILITER(S): at 05:07

## 2025-01-08 RX ADMIN — Medication 40 MILLIGRAM(S): at 12:38

## 2025-01-08 RX ADMIN — Medication 100 MILLIGRAM(S): at 21:45

## 2025-01-08 RX ADMIN — IPRATROPIUM BROMIDE AND ALBUTEROL SULFATE 3 MILLILITER(S): .5; 2.5 SOLUTION RESPIRATORY (INHALATION) at 17:24

## 2025-01-08 RX ADMIN — Medication 1 APPLICATION(S): at 17:26

## 2025-01-08 RX ADMIN — ACETYLCYSTEINE 4 MILLILITER(S): 200 INHALANT RESPIRATORY (INHALATION) at 17:25

## 2025-01-08 RX ADMIN — HEPARIN SODIUM 5000 UNIT(S): 1000 INJECTION INTRAVENOUS; SUBCUTANEOUS at 14:57

## 2025-01-08 RX ADMIN — Medication 1 DROP(S): at 01:05

## 2025-01-08 NOTE — PROGRESS NOTE ADULT - SUBJECTIVE AND OBJECTIVE BOX
SUBJECTIVE:  Patient was seen and examined at bedside. Patient looking comfortable on Ventilator, no events reported.     Review of systems: Unable to obtain     Diet, NPO with Tube Feed:   Tube Feeding Modality: Gastrostomy  Maribeth Sauceda Renal Support 1.8  Total Volume for 24 Hours (mL): 1080  Total Number of Cans: 5  Continuous  Starting Tube Feed Rate mL per Hour: 30  Increase Tube Feed Rate by (mL): 10     Every 4 hours  Until Goal Tube Feed Rate (mL per Hour): 60  Tube Feed Duration (in Hours): 18  Tube Feed Start Time: 10:00  Tube Feed Stop Time: 04:00  Banatrol TF     Qty per Day:  2 (12-18-24 @ 09:33) [Active]      MEDICATIONS:  MEDICATIONS  (STANDING):  acetylcysteine 10%  Inhalation 4 milliLiter(s) Inhalation every 6 hours  albuterol/ipratropium for Nebulization 3 milliLiter(s) Nebulizer every 6 hours  amLODIPine   Tablet 5 milliGRAM(s) Oral daily  artificial tears (preservative free) Ophthalmic Solution 1 Drop(s) Right EYE every 4 hours  baclofen 5 milliGRAM(s) Oral every 12 hours  chlorhexidine 0.12% Liquid 15 milliLiter(s) Oral Mucosa every 12 hours  chlorhexidine 2% Cloths 1 Application(s) Topical <User Schedule>  doxazosin 8 milliGRAM(s) Oral at bedtime  erythromycin   Ointment 1 Application(s) Right EYE at bedtime  fluticasone propionate 50 MICROgram(s)/spray Nasal Spray 1 Spray(s) Both Nostrils two times a day  heparin   Injectable 5000 Unit(s) SubCutaneous every 8 hours  levETIRAcetam 1500 milliGRAM(s) Oral two times a day  ofloxacin 0.3% Solution 1 Drop(s) Right EYE four times a day  pantoprazole  Injectable 40 milliGRAM(s) IV Push daily  petrolatum Ophthalmic Ointment 1 Application(s) Both EYES two times a day  phenytoin   Suspension 100 milliGRAM(s) Oral every 8 hours  propranolol 10 milliGRAM(s) Oral every 8 hours  sodium chloride 0.65% Nasal 1 Spray(s) Both Nostrils two times a day    MEDICATIONS  (PRN):  acetaminophen   Oral Liquid .. 650 milliGRAM(s) Oral every 6 hours PRN Temp greater or equal to 38.5C (101.3F), Mild Pain (1 - 3)      Allergies    Allergy Status Unknown    Intolerances        OBJECTIVE:  Vital Signs Last 24 Hrs  T(C): 36.6 (08 Jan 2025 04:50), Max: 37.1 (07 Jan 2025 17:27)  T(F): 97.9 (08 Jan 2025 04:50), Max: 98.7 (07 Jan 2025 17:27)  HR: 62 (08 Jan 2025 08:43) (58 - 70)  BP: 114/77 (08 Jan 2025 08:43) (113/68 - 125/81)  BP(mean): 90 (08 Jan 2025 08:43) (85 - 98)  RR: 14 (08 Jan 2025 08:43) (14 - 17)  SpO2: 98% (08 Jan 2025 08:43) (98% - 100%)    Parameters below as of 08 Jan 2025 08:43  Patient On (Oxygen Delivery Method): ventilator    O2 Concentration (%): 40  I&O's Summary    07 Jan 2025 07:01  -  08 Jan 2025 07:00  --------------------------------------------------------  IN: 2880 mL / OUT: 1595 mL / NET: 1285 mL    08 Jan 2025 07:01  -  08 Jan 2025 10:49  --------------------------------------------------------  IN: 440 mL / OUT: 0 mL / NET: 440 mL        PHYSICAL EXAM:  General: nonresponsive, open eyes spontaneously, NAD, looking comfortable on vent.    HEENT: trach in place clean  Lungs: anterior, no crackles, no wheezes  Heart: regular  Abdomen: no distension, soft, no visible tenderness, + BS  Extremities:  Condom cath draining clear urine. LE: warm, no edema, no visible tenderness, not following commands   LABS:                        9.9    7.08  )-----------( 232      ( 08 Jan 2025 05:30 )             30.4     01-08    140  |  104  |  59[H]  ----------------------------<  124[H]  3.6   |  23  |  1.35[H]    Ca    9.6      08 Jan 2025 05:30  Phos  3.7     01-08  Mg     2.0     01-08          CAPILLARY BLOOD GLUCOSE        Urinalysis Basic - ( 08 Jan 2025 05:30 )    Color: x / Appearance: x / SG: x / pH: x  Gluc: 124 mg/dL / Ketone: x  / Bili: x / Urobili: x   Blood: x / Protein: x / Nitrite: x   Leuk Esterase: x / RBC: x / WBC x   Sq Epi: x / Non Sq Epi: x / Bacteria: x        MICRODATA:      RADIOLOGY/OTHER STUDIES:

## 2025-01-08 NOTE — PROGRESS NOTE ADULT - ASSESSMENT

## 2025-01-08 NOTE — PROGRESS NOTE ADULT - SUBJECTIVE AND OBJECTIVE BOX
HPI:  Unknown age male, unknown past medical history (reported stroke and MI by coworkers) presented to [facility] with AMS, Pt was working at [employer] when he was found down by coworkers. EMS called and pt brought to [facility] ED. Intubated, sedated, started on cardene for SBPs in 200s. CT head showed brain stem bleed. Transferred to NSICU for further management.  (30 Sep 2024 12:55)    OVERNIGHT EVENTS: No events overnight.     Hospital Course: Whole hospital course can be found in provider handoff.   1/1: GEORGE overnight, neuro stable.  1/2: GEORGE overnight, neuro stable. FW decreased to 100q6.   1/3: GEORGE overnight, neuro stable. fosphenytoin IV changed to pheytoin PO via PEG per epilepsy recommendations  1/4: GEORGE overnight, neuro stable. FW incr. to 100q4  1/5: GEORGE overnight, neuro stable.   1/6: GEORGE overnight, neuro stable   1/7: GEORGE overnight, neuro stable. BUN/Cr increased, increased FW to 200q6. Given 1L bolus of LR over 2 hours. Vuong d/c'd, voiding, continue bladder scan q6.     Vital Signs Last 24 Hrs  T(C): 37.1 (07 Jan 2025 21:46), Max: 37.1 (07 Jan 2025 17:27)  T(F): 98.7 (07 Jan 2025 21:46), Max: 98.7 (07 Jan 2025 17:27)  HR: 70 (07 Jan 2025 23:48) (64 - 70)  BP: 113/68 (07 Jan 2025 23:48) (113/68 - 125/81)  BP(mean): 85 (07 Jan 2025 23:48) (85 - 98)  RR: 17 (07 Jan 2025 23:48) (14 - 17)  SpO2: 98% (07 Jan 2025 23:48) (98% - 100%)    Parameters below as of 07 Jan 2025 23:48  Patient On (Oxygen Delivery Method): ventilator    O2 Concentration (%): 40    I&O's Summary    06 Jan 2025 07:01  -  07 Jan 2025 07:00  --------------------------------------------------------  IN: 1240 mL / OUT: 990 mL / NET: 250 mL    07 Jan 2025 07:01  -  08 Jan 2025 00:28  --------------------------------------------------------  IN: 2500 mL / OUT: 870 mL / NET: 1630 mL    PHYSICAL EXAM:  Constitutional: NAD, lying in bed.  HEENT: Pupils equal, round, reactive to light.   Respiratory: +Trach. No respiratory distress, lungs clear to auscultation bilaterally.   Cardiovascular: RRR, S1, S2.   Gastrointestinal: +PEG, Abdomen soft, nondistended.  Neurological:  AAOX1. Opens eyes. Tracks vertically  Motor: RUE HG 1, LUE 0/5, B/l LE w/d.   Sensation: unable to assess  Extremities: Warm, well perfused.    TUBES/LINES:  [] Vuong  [] Lumbar Drain  [] Wound Drains  [x] Others  - trach, peg    DIET:  [] NPO  [] Mechanical  [x] Tube feeds    LABS:                        10.5   7.15  )-----------( 251      ( 07 Jan 2025 07:38 )             33.0     01-07    140  |  104  |  61[H]  ----------------------------<  120[H]  4.1   |  23  |  1.53[H]    Ca    9.3      07 Jan 2025 07:38  Phos  4.1     01-07  Mg     2.2     01-07        Urinalysis Basic - ( 07 Jan 2025 07:38 )    Color: x / Appearance: x / SG: x / pH: x  Gluc: 120 mg/dL / Ketone: x  / Bili: x / Urobili: x   Blood: x / Protein: x / Nitrite: x   Leuk Esterase: x / RBC: x / WBC x   Sq Epi: x / Non Sq Epi: x / Bacteria: x    CAPILLARY BLOOD GLUCOSE    Drug Levels: [] N/A  CSF Analysis: [] N/A    Allergies  Allergy Status Unknown  Intolerances    MEDICATIONS:  Antibiotics:    Neuro:  acetaminophen   Oral Liquid .. 650 milliGRAM(s) Oral every 6 hours PRN  baclofen 5 milliGRAM(s) Oral every 12 hours  levETIRAcetam 1500 milliGRAM(s) Oral two times a day  phenytoin   Suspension 100 milliGRAM(s) Oral every 8 hours    Anticoagulation:  heparin   Injectable 5000 Unit(s) SubCutaneous every 8 hours    OTHER:  acetylcysteine 10%  Inhalation 4 milliLiter(s) Inhalation every 6 hours  albuterol/ipratropium for Nebulization 3 milliLiter(s) Nebulizer every 6 hours  amLODIPine   Tablet 5 milliGRAM(s) Oral daily  artificial tears (preservative free) Ophthalmic Solution 1 Drop(s) Right EYE every 4 hours  chlorhexidine 0.12% Liquid 15 milliLiter(s) Oral Mucosa every 12 hours  chlorhexidine 2% Cloths 1 Application(s) Topical <User Schedule>  doxazosin 8 milliGRAM(s) Oral at bedtime  erythromycin   Ointment 1 Application(s) Right EYE at bedtime  fluticasone propionate 50 MICROgram(s)/spray Nasal Spray 1 Spray(s) Both Nostrils two times a day  ofloxacin 0.3% Solution 1 Drop(s) Right EYE four times a day  pantoprazole  Injectable 40 milliGRAM(s) IV Push daily  petrolatum Ophthalmic Ointment 1 Application(s) Both EYES two times a day  propranolol 10 milliGRAM(s) Oral every 8 hours  sodium chloride 0.65% Nasal 1 Spray(s) Both Nostrils two times a day    IVF:    CULTURES:  Culture Results:   Numerous Klebsiella aerogenes (Previously Enterobacter)  Numerous Stenotrophomonas maltophilia  Commensal iram consistent with body site (12-25 @ 20:05)    RADIOLOGY & ADDITIONAL TESTS:    ASSESSMENT:  44M PMH ?stroke/MI present to [facility] after collapsing at work. Decorticate posturing, vomiting, intubated for airway protection. 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 Transferred to [facility] for further management. s/p trach 10/14. s/p peg 10/21. Re-upgrade to ICU 2/2 desaturation event and suctioning requirements 11/15. Re-upgrade to NSICU 12/15 2/2 desaturation and tachycardia.    Neuro:  - neuro/vitals q4h  - pain control: tylenol prn  - seizure tx: keppra 1500mg BID, phenytoin 100mg PO TID  - vEEG (10/3-4) negative, (10/17-10/19) negative, (12/17-12/18) negative, (12/22-12/25 ) +focal motor seizures not correlating w/ EEG  - epilepsy following  - CTH 9/30: enlarged pontine hemorrhage, CTH 10/3: stable, CTH 10/25: mostly resolved pontine hemorrhage, CTH 12/15: R mastoid air cell opacification; acute otitis media vs sterile effusion, CTH 12/18: stable.  - MRI brain 10/12: parenchymal hemorrhage, acute/subacute R cerebellar stroke    - stroke neuro signed off    CV:  - -160  - tachycardia: propranolol 10mg q8  - HTN: amlodipine 5mg  - echo (9/30) EF 75%, repeat 12/16: 57%    Resp:  - trach to vent, AC/VC 40/400/14/6  - CTA PE protocol 12/15 negative   - Secretions: duonebs/mucomyst/chest PT q6h   - CT Chest 12/24 for infectious w/u: b/l LL atelectasis and opacities     GI:  - TF via PEG (placed 10/21 by gen surg)  - bowel regimen held for loose stool, last BM 1/5  - baclofen 5q12 for hiccups     Renal:  - IVL, FW 200q6 for hydration   - Urinary retenion: Vuong removed 1/7, voiding  - CKD: trend BUN/Cr  - renal US 10/1: echogenicity c/w chronic med renal dz, repeat 10/8: inc renal echogeicity, c/f medical renal disease w increased hydro     Endo:  - A1c 5.4    Heme:  - DVT ppx: SCDs, SQH 5000u q8h   - LE dopplers negative 12/16    ID:  - last pan cx 12/22  - empiric PNA: cefepime (12/22-12/30), s/p vanc (12/22-12/23), s/p zosyn (12/15-12/20), s/p vanc (12/15-12/16)  - s/p Stenotrophomonas maltophilia PNA: s/p Bactrim (11/18-11/19) s/p Cefepime (11/16-11/18)  - 11/3, (+) sputum for stenotrophomas maltophlia, blood cx (+) klebsiella, cefepime 2gq12 (11/6 - 11/12)   - empiric tx: s/p zosyn (11/3-11/6) , s/p vanc  (11/3-11/5)  - S/p Ancef (10/4-10/14) for PNA, and s/p Unasyn (10/18-10/23) +actinobacter baumanii     MISC:  - ophtho consult for keratitis  - erythromycin ointment R eye q4hrs, ofloxacin ointment R eye QID, artificial tears R eye q2hrs, moisture chamber at bedtime    Dispo: SDU status, DNR, pending PRUCOL for benefits     D/w Dr. D'Amico

## 2025-01-09 PROCEDURE — 99233 SBSQ HOSP IP/OBS HIGH 50: CPT

## 2025-01-09 PROCEDURE — 99232 SBSQ HOSP IP/OBS MODERATE 35: CPT

## 2025-01-09 RX ADMIN — ACETYLCYSTEINE 4 MILLILITER(S): 200 INHALANT RESPIRATORY (INHALATION) at 05:06

## 2025-01-09 RX ADMIN — IPRATROPIUM BROMIDE AND ALBUTEROL SULFATE 3 MILLILITER(S): .5; 2.5 SOLUTION RESPIRATORY (INHALATION) at 05:07

## 2025-01-09 RX ADMIN — OFLOXACIN 1 DROP(S): 3 SOLUTION OPHTHALMIC at 11:03

## 2025-01-09 RX ADMIN — Medication 1 DROP(S): at 05:07

## 2025-01-09 RX ADMIN — Medication 1 DROP(S): at 01:15

## 2025-01-09 RX ADMIN — Medication 100 MILLIGRAM(S): at 12:37

## 2025-01-09 RX ADMIN — OFLOXACIN 1 DROP(S): 3 SOLUTION OPHTHALMIC at 00:16

## 2025-01-09 RX ADMIN — Medication 15 MILLILITER(S): at 18:04

## 2025-01-09 RX ADMIN — OFLOXACIN 1 DROP(S): 3 SOLUTION OPHTHALMIC at 23:42

## 2025-01-09 RX ADMIN — ERYTHROMYCIN 1 APPLICATION(S): 5 OINTMENT OPHTHALMIC at 21:48

## 2025-01-09 RX ADMIN — Medication 1 DROP(S): at 14:49

## 2025-01-09 RX ADMIN — Medication 100 MILLIGRAM(S): at 05:42

## 2025-01-09 RX ADMIN — IPRATROPIUM BROMIDE AND ALBUTEROL SULFATE 3 MILLILITER(S): .5; 2.5 SOLUTION RESPIRATORY (INHALATION) at 00:15

## 2025-01-09 RX ADMIN — Medication 1 DROP(S): at 21:48

## 2025-01-09 RX ADMIN — Medication 100 MILLIGRAM(S): at 21:49

## 2025-01-09 RX ADMIN — Medication 1 SPRAY(S): at 18:05

## 2025-01-09 RX ADMIN — ACETYLCYSTEINE 4 MILLILITER(S): 200 INHALANT RESPIRATORY (INHALATION) at 23:41

## 2025-01-09 RX ADMIN — Medication 1 APPLICATION(S): at 18:05

## 2025-01-09 RX ADMIN — AMLODIPINE BESYLATE 5 MILLIGRAM(S): 10 TABLET ORAL at 05:06

## 2025-01-09 RX ADMIN — ACETYLCYSTEINE 4 MILLILITER(S): 200 INHALANT RESPIRATORY (INHALATION) at 18:04

## 2025-01-09 RX ADMIN — ACETYLCYSTEINE 4 MILLILITER(S): 200 INHALANT RESPIRATORY (INHALATION) at 00:15

## 2025-01-09 RX ADMIN — FLUTICASONE PROPIONATE 1 SPRAY(S): 50 SPRAY, METERED NASAL at 05:07

## 2025-01-09 RX ADMIN — LEVETIRACETAM 1500 MILLIGRAM(S): 10 INJECTION, SOLUTION INTRAVENOUS at 18:03

## 2025-01-09 RX ADMIN — Medication 1 APPLICATION(S): at 05:07

## 2025-01-09 RX ADMIN — Medication 1 SPRAY(S): at 05:07

## 2025-01-09 RX ADMIN — DOXAZOSIN MESYLATE 8 MILLIGRAM(S): 8 TABLET ORAL at 21:48

## 2025-01-09 RX ADMIN — HEPARIN SODIUM 5000 UNIT(S): 1000 INJECTION INTRAVENOUS; SUBCUTANEOUS at 14:23

## 2025-01-09 RX ADMIN — Medication 15 MILLILITER(S): at 05:07

## 2025-01-09 RX ADMIN — HEPARIN SODIUM 5000 UNIT(S): 1000 INJECTION INTRAVENOUS; SUBCUTANEOUS at 21:49

## 2025-01-09 RX ADMIN — FLUTICASONE PROPIONATE 1 SPRAY(S): 50 SPRAY, METERED NASAL at 18:05

## 2025-01-09 RX ADMIN — BACLOFEN 5 MILLIGRAM(S): 10 INJECTION INTRATHECAL at 16:11

## 2025-01-09 RX ADMIN — OFLOXACIN 1 DROP(S): 3 SOLUTION OPHTHALMIC at 05:08

## 2025-01-09 RX ADMIN — Medication 1 DROP(S): at 18:04

## 2025-01-09 RX ADMIN — IPRATROPIUM BROMIDE AND ALBUTEROL SULFATE 3 MILLILITER(S): .5; 2.5 SOLUTION RESPIRATORY (INHALATION) at 18:04

## 2025-01-09 RX ADMIN — Medication 1 APPLICATION(S): at 05:59

## 2025-01-09 RX ADMIN — IPRATROPIUM BROMIDE AND ALBUTEROL SULFATE 3 MILLILITER(S): .5; 2.5 SOLUTION RESPIRATORY (INHALATION) at 23:41

## 2025-01-09 RX ADMIN — LEVETIRACETAM 1500 MILLIGRAM(S): 10 INJECTION, SOLUTION INTRAVENOUS at 05:06

## 2025-01-09 RX ADMIN — ACETYLCYSTEINE 4 MILLILITER(S): 200 INHALANT RESPIRATORY (INHALATION) at 11:02

## 2025-01-09 RX ADMIN — IPRATROPIUM BROMIDE AND ALBUTEROL SULFATE 3 MILLILITER(S): .5; 2.5 SOLUTION RESPIRATORY (INHALATION) at 11:02

## 2025-01-09 RX ADMIN — BACLOFEN 5 MILLIGRAM(S): 10 INJECTION INTRATHECAL at 05:06

## 2025-01-09 RX ADMIN — HEPARIN SODIUM 5000 UNIT(S): 1000 INJECTION INTRAVENOUS; SUBCUTANEOUS at 05:06

## 2025-01-09 RX ADMIN — Medication 40 MILLIGRAM(S): at 11:02

## 2025-01-09 RX ADMIN — OFLOXACIN 1 DROP(S): 3 SOLUTION OPHTHALMIC at 18:04

## 2025-01-09 RX ADMIN — Medication 1 DROP(S): at 09:32

## 2025-01-09 NOTE — PROGRESS NOTE ADULT - SUBJECTIVE AND OBJECTIVE BOX
HPI:  Unknown age male, unknown past medical history (reported stroke and MI by coworkers) presented to [facility] with AMS, Pt was working at [employer] when he was found down by coworkers. EMS called and pt brought to [facility] ED. Intubated, sedated, started on cardene for SBPs in 200s. CT head showed brain stem bleed. Transferred to NSICU for further management.  (30 Sep 2024 12:55)    OVERNIGHT EVENTS: GEORGE overnight, neuro stable.    Hospital Course:   9/30: transferred from [facility]. A line placed. Versed dc'd. Cy Rader at bedside, states pt has family in [place], cannot confirm medications or PMH other than stroke and MI. 250cc bolus 3% given. LR switched to NS. hydralazine 25q8 started, 3% started, switched propofol to precedex   10/1: stability CTH done. Added labetalol, started TF. Palliative consulted. ethics consulted to determine surrogate. febrile 103, pan cx sent  10/2: BD 2, GEORGE overnight. TF resumed. Desatt'd to 80s, FiO2 inc. to 50. Fentanyl given, ABG, CXR ordered. Maxxed on precedex, started on propofol for DARIEN -4 - -5. Precedex dc'd. Duonebs, mucomyst, hypertonic added. 3% dc'd. Cardene dc'd. Start vanc/CTX. Increased labetalol 200q8. MRSA negative, dc'd vanc. ETT pulled back 2cm x 2, good positioning after confirmatory chest xray. Ethics attempting to establish HCP with family. Na 159, starting FW 250q6 for range 150-155.   10/3: BD3, GEORGE o/n, neuro stable. Na elevating, FW increased to 300q6. Dc'd bowel reg for diarrhea. vEEG started. SQH 5000q8 tonight.   10/4: BD 4, albumn bolus, incr. LR to 80 2/2 incr. in Cr, LR to 10 0cc/hr for uptrending Cr. Started 7% hypersal for 48hrs and SL atropine for inline/oral thick secretions. Dc'd CTX and started ancef for MSSA in the sputum. Nephrology consulted for CKD, f/u recs. SBP 170s, given hydralazine 10mg IVP.   10/5: BD5, o/n 10mg IVP hydralazine given for SBP 170s and started on hydralazine 25q8 via OGT. 10mg IV push labetalol for SBP > 160s. RT placed for diarrhea.   10/6: BD6, o/n FW increased to 350q4 per nephrology recs. IV tylenol for temp 100.6, SBp 160s presumed uncomfortable.   10/7: BD7, overnight pancultured for temp 101.8F.   10/8: BD8. GEORGE. Cr bumped. decreased LR to 75cc/hr. Adding simethicone ATC. incr hydralazine 50mgTID. Incr labetalol 300mgTID. Na 145, decreased FWF to 250q6. Start precedex. FENa consistent with intrinsic kidney injury. Pend repeat renal US. Retaining up to 1.3L, bladder scans q6, straight cath PRN  10/9: BD 9. GEORGE overnight. Neuro stable. abd xray for distention w non-specific gas pattern, OGT to LIWS for morning. duonebs/mucomyst to q8 for improving secretions. Changed tube feeds to Jevity 1.5 20cc/hr, low rate due to abdominal distention, nepro dense and more difficult to digest. Tolerating CPAP, confirmed by ABG.   10/10: BD 10. GEORGE overnight. Neuro stable. (+) gabriel for urinary retention on bladder scan. inc TF to goal rate of 40cc/hr. family leaning toward pursuing trach/PEG. 1/2 amp for FS 81.   10/11: BD 11. GEORGE overnight. Neuro stable. Trach/PEG consults placed.   10/12: BD 12. GEORGE overnight. Neuro stable. MRI brain complete.   10/13: BD 13. Increase flomax. Hold SQH after PM dose for trach tm. IVL.   10/14: BD 14. GEORGE overnight, remains on AC/VC. Gabriel placed for urinary retention. Dc'd free water.  S/p trach with pulm. NGT placed and CXR confirmed in good position.   10/15: BD 15, GEORGE ovn. resumed feeds. spiked 101, pan cx sent.   10/16: BD 16. GEORGE ovn. Lokelma 5mg for K+ 5.4. Started vanc q 24/zosyn for empiric PNA coverage, IVF to 100/hr. PEG held for fever.   10/17: BD 17,  ordered serum osm and urine osm for am. Started sinemet for neurostimulation. Increased cardura to 0.8. Started FW 100q4, dc'd IVF. MRSA negative, dc'd vanc. NGT replaced d/t coiling.   10/18: BD 18, GEORGE overnight, neuro stable. Amantadine added for neurostim. zosyn changed to unasyn for acinetobacter baumannii, failed TOV and required SC  10/19: BD 19, GEORGE ovn. cardura 2mg added for retention. labetalol decreased 200q8, hydralazine decreased 25q8. Gabriel replaced.   10/20: BD20, GEORGE overnight. NGT dislodged, replaced. PEG tomorrow w/ gen surg, FW increased to 150q4 and labetalol decreased to 100q8, lokelma given for hyperkalemia.   10/21: BD 21. POD0 PEG placement with Gen surg. decr labetolol to 50q8, incr. cardura to 0.4, started lokelma and phoslo, dc gabriel POD0 PEG placement with Gen surg.  10/22: BD 22. Plan to start TF today via PEG. dc labetalol, Following ophtho recs. Increased apnea settings - found to be in cheyne-moe respiration. CPAP 5/5.  10/23: BD 23. hydralazine d/c'd, trach collar trial today. Rectal tube placed at 6am.  10/24: BD24, o/n lokelma held due to diarrhea. Free water 100q6 resumed. dc'd tamsulosin, amantadine. Incr'd cardura to 8mg qhs. Dc'd FW. Switched jevity to nepro. gabriel placed for high urine output. Started SL atropine for oral secretions. Dc'd free water.  10/25: BD25, o/n decreased suctioning requirements to > q4hrs, GEORGE. Cr improving, cont phoslo, lokelma held at this time. Gabriel placed yest, cont. Tolerating trach collar. Given 500cc plasmalyte bolus for ANIKA. Dc'd sinemet.   10/26: BD26, o/n resumed lokelma 5mg daily and resumed 100cc free water q6hrs. Change in neuro status with new right pupillary dilation with anisocoria (right pupil 6mm fixed and left pupil 3mm briskly reactive). Given 23.4% NaCl bullet, taken for emergent CTH showing mostly resolved pontine hemorrhage, continued brainstem hypodensity likely edema d/t hemorrhage, no new hemorrhage or infarct, no herniation, mild increase in size of left lateral ventricle. Vitals remaine stable. Na goal > 140.   10/27: BD27, o/n GEORGE.Neuro stable. Pend stepdown with airway bed.   10/28: BD 28. GEORGE overnight. Neuro stable. Miralax ordered. Gabriel removed, pending TOV.  10/29: BD 29. GEORGE o/n. Given 2L NS over 8 hrs for increased BUN/Cr ratio. Gabriel placed for frequent straight cath.   10/30: BD 30.   10/31: BD 31. GEORGE overnight. Na 149, increased free water to 200q6. 1L NS for uptrending BUN.   11/1: BD 32. GEORGE overnight. Given 1L NS for dehydration. Na 146, increased FW to 250q6.   11/2: BD 33 GEORGE overnight, neuro stable, given 500cc bolus for net negative status and tachycardia   11/3: BD 34, GEORGE overnight, neuro stable. Patient remains tachycardic, EKG showing sinus tachycardia, given additional 500cc NS bolus. Febrile to 101.9F, pan cultured (without UA), CXR WNL, given tylenol.   11/4: BD 35, GEORGE overnight, neuro stable. Given 1L NS for tachycardia. sputum (+) for stenotropohomas maltophilia.   11/5: BD 36 GEORGE overnight, neuro stable. Vancomycin dc'd. Chest PT BID. ID consulted, cont zosyn.  11/6: BD 37. blood cx + klebsiella dc zosyn changed to cefepime, CTAP ordered, rpt blood cx sent.    11/7: BD 38. Pending CT A/P, given 250cc bolus and starting maintenance fluids overnight. Pending CT A/P after bolus   11/8: BD 39. CT CAP negative for infection.   11/9: BD 40. GEORGE overnight.  11/10: BD 41. GEORGE overnight. desat to 85 on trach collar, O2 inc to 10L and 100%, O2 sat inc to 95. pt tachy to 110s, euvolemic. given tylenol. ABG and CXR ordered. spiked fever, pancultured, RVP negative. AM ABG w pO2 79, rpt w pO2 79. pt appears comfortable, satting 94%.   11/11: BD 42. GEORGE overnight. pt became tachy to 130s, desat to 90 on 100% FiO2 and 10L. suctioned, (+) productive cough. temp 101.4, given 1g IV tylenol and 500cc NS bolus for euvolemia. fever and HR downtrending. LE dopplers negative for dvt  11/12: BD 43, GEORGE ovn, fever and HR downtrending, satting 97% 70% FIO2  11/13: BD 44, GEORGE ovn. started standing tylenol x24 hours for tachycardia. desat to 80s, o2 increased. CXR stable, pending CTA PE protocol.   11/14: BD 45, GEORGE overnight, neuro stable. resp therapy dec FiO2 to 70%.   11/15: BD 46, GEORGE overnight, neuro stable.  Rapid called for desaturation 30s, tachycardic 140s. Patient bagged, 100% fio2, heavily suctioned. CXR/POCUS unremarkable. ABG c/w desaturation. WBC 14.71. Afebrile. O2 improved to 90s and patient upgraded to ICU. ABG paO2 30s improved to 89 on vent. IV Tylenol x 1, sputum sent. Start protonix while o-n vent.   11/16: BD 47. POCUS showed collapsable IVCF, given 1L bolus. Vanco/Cefpime added empriic for PNA, NGT feeds restarted. MRSA swab neg, Vanc DC'd.   11/17: BD 48. GEORGE overnight. 1l bolus for tachycardia. Spiked to 101, cultured. 500cc bolus for tachycardia, tachy to 148 given 25mcg fentanyl, 250cc albumin, 1.5L bolus. 5 IV lopressor with response HR to 100s. +Stenotrophomonas on sputum cx.   11/18: BD 49. GEORGE overnight. Consulted ID, cefepime switched to bactrim x7days. Started hydrochlorothiazine 12.5mg daily.  11/19: BD 50. Tachy 120s, given tylenol and 500cc NS. Tolerating 5/5, switched to TCx. Holding phos binder. D/c Bactrim. D/c gabriel, f/u TOV. Dc'd PPI.   11/20: BD 51. GEORGE ovn. 1600 satting low 90s, mildly tachy to 110s, afebrile, RR wnl. O2 improved to mid 90s while inc O2 to 100% on TCx. CPAP 5/5 placed back on.  11/21: BD 52, GEORGE ovn, tolerating CPAP 5/5. Switch to trach collar during the day if tolerating well. HCTZ held for Cr bump, straight cath frequence increased to q4  11/22: BD 53, GEORGE ovn. Resumed phoslo. Gabriel placed. Resumed HCTZ.   11/23: BD 54. Holding tylenol in setting of possible fever, will require pan cx if febrile. Cr improved today. Cont CPAP. Bowel regimen held i/s/o diarrhea. FOBT negative.  11/24: BD 55. GEORGE overnight. Neuro stable. HCTZ dc'ed, started lisinopril 5. Lokelma dc'ed for K 3.7.   11/25: BD 56, GEORGE overnight. Neuro stable. dc'd lisinopril 5mg. Gabriel dc'd. TOV. 1545 noted to be hypotensive, MAP 50, in supine position on chair, HR 60s, afebrile, O2 96%. Given 1L cc NS bolus, placed back on bed in reverse trendelenberg, improved to map of 66. Neostick at bedside. Vitals check q1h. Dc'ed amlodipine. Failed TOV, bladder scan q6, sc prn. Added back Senna.   11/26: BD 57, GEORGE overnight. Neuro stable. Dc'd phoslo.   11/27: BD 58, GEORGE overnight. Neuro stable.    11/28: BD 59. Gabriel replaced.   11/29: GEORGE.  11/30: GEORGE, neuro stable.   12/1: BD 62, GEORGE overnight  12/2: BD 63, GEORGE overnight.; Given 1L bolus of LR for uptrending BUN/Cr.  12/3: BD 64. Reinstated eye gtt/moisture chamber given increased Rt eye injection  12/4: BD 65. GEORGE overnight. Attempted to speak with ophtho regarding eyelid weight/closure but no answer, full mailbox.   12/5: BD 66, GEORGE overnight, bowel regimen increased and had BM.   12/6: BD 67, GEORGE overnight, neuro stable.  12/7: GEORGE overnight, neuro stable. /110s, given x1 hydralazine 10 mg IVP. Restarted home amlodipine 5mg.  12/8: GEORGE. OOB to chair.     12/11: GEORGE, mucomyst added for thick secretions, simethicone for abd distension, abd xray with stool burden, increased bowel regimen.   12/12: GEORGE overnight.   12/13: GEORGE overnight.   12/14: GEORGE overnight  12/15: o/n Patient became tachycardic HR 120s and 10 minutes later O2 sat dropped as low as 89%. Patient suctioned without improvement in O2 sat and tube feeds found in suction catheter. TFs held.  STAT CXR ordered. STAT labs sent. Respiratory therapist called to bedside and patient trach connected to ventilator. After connection to ventilator and further suctioning O2 sat improved to 97% but patient HR remains 120-130s. Upgraded to NSICU for further management. Vancomycin and zosyn started. CTA  chest PE protocol and CTH ordered. Blood cultures sent.given 500cc bolus, rpt ABG sent pO2 243, CTH and CTA chest done. FS while NPO, FiO2 dec 50 pending ABG. sputum cx positive for few GPC and GNR.   12/16: GEORGE overnight, restarted amlodipine, troponin 75   12/17: GEORGE overnight, neuro stable. Attempted CPAP this morning, did not tolerate and back on full vent support. Dc'd Vanc. Tachycardia to 140s, noted extremities to be twitching along with jaw twitching. Given 2g of Keppra, total 4mg ativan and placed on EEG, full set of labs and lactate negative. Resumed trickle feeds at 20cc/hr. Given 250cc albumin for tachycardia.   12/18: GEORGE overnight. Neuro stable. CTH stable. EEG negative and dc'd.   12/19: GEORGE overnight. neuro stable. SIMV most of day, AC/VC at night.   12/20: GEORGE overnight, neuro stable. remains on VC/AC  12/21: GEORGE ovn. 1L bolus for tachy to 120s-130s.   12/22: GEORGE ovn. Tachy to 120s, given tylenol and IVF. 2mg IV ativan given for L jaw twitching. Sx resolved for 3 minutes and started again. Epilepsy contacted. Given 2mg IV ativan. Continued twitching. Increased keppra to 1500mg BID, 2mg ativan given. Propranolol started for refractory tachycardia. vEEG ordered. albumin given. POCUS performed, no b lines. Started on fosphenytoin, loaded w 20mg/kg then 100mg TID. febrile, pancx, started cefepime 2g q8, vancomycin  12/23: GEORGE ovn. +focal motor seizures on eeg. ID consulted. Vancomycin dc'ed as per ID.  12/24: GEORGE ovn, neuro stable. Baclofen 5 mg q12 started for hiccups. Na 129 from 134. Urine lytes c/w SIADH. Given 250cc 3% bolus. CT chest for infection w/u - f/u read. Repeat Na 136. UA negative  12/25: GEORGE ovn.   12/26: GEORGE ovn  12/27: GEORGE overnight. Blood cx neg @ 4 days, DC cefepime per medicine (sputum colonized).   12/28: Desaturation o/n to 80's, CXR obtained, pulse ox changed and sats resolved. Na 133 from 138, 250cc 3% bolus given. Cefepime resumed until 12/30 per ID. Rpt Na 138.  12/29: GEORGE overnight. Na stable.   12/30: GEORGE overnight. Family meeting with son, pt now DNR.   12/31: GEORGE overnight.   1/1: GEORGE overnight, neuro stable.  1/2: GEORGE overnight, neuro stable. FW decreased to 100q6.   1/3: GEORGE overnight, neuro stable. fosphenytoin IV changed to pheytoin PO via PEG per epilepsy recommendations  1/4: GEORGE overnight, neuro stable. FW incr. to 100q4  1/5: GEORGE overnight, neuro stable.   1/6: GEORGE overnight, neuro stable   1/7: GEORGE overnight, neuro stable. BUN/Cr increased, increased FW to 200q6. Given 1L bolus of LR over 2 hours. Gabriel d/c'd, voiding, continue bladder scan q6.   1/8: GEORGE overnight, neuro stable. BUN/Cr improving.  1/9: GEORGE overnight, neuro stable.     Vital Signs Last 24 Hrs  T(C): 36.8 (08 Jan 2025 21:55), Max: 36.9 (08 Jan 2025 17:26)  T(F): 98.3 (08 Jan 2025 21:55), Max: 98.5 (08 Jan 2025 17:26)  HR: 72 (09 Jan 2025 00:20) (58 - 72)  BP: 113/80 (09 Jan 2025 00:20) (113/80 - 121/80)  BP(mean): 92 (09 Jan 2025 00:20) (89 - 96)  RR: 14 (09 Jan 2025 00:20) (14 - 16)  SpO2: 99% (09 Jan 2025 00:20) (96% - 100%)    Parameters below as of 09 Jan 2025 00:20  Patient On (Oxygen Delivery Method): ventilator    O2 Concentration (%): 40    I&O's Summary    07 Jan 2025 07:01  -  08 Jan 2025 07:00  --------------------------------------------------------  IN: 2880 mL / OUT: 1595 mL / NET: 1285 mL    08 Jan 2025 07:01  -  09 Jan 2025 00:52  --------------------------------------------------------  IN: 1950 mL / OUT: 935 mL / NET: 1015 mL    PHYSICAL EXAM:  Constitutional: NAD, lying in bed.  HEENT: Pupils equal, round, reactive to light.   Respiratory: +Trach. No respiratory distress, symmetric chest rise  Cardiovascular: Regular rate and rhythm    Gastrointestinal: +PEG, Abdomen soft, nondistended.  Neurological:  AAOX0-1. Opens eyes. Tracks vertically  Motor: RUE squeezes hand to comman, LUE 0/5, B/l LE w/d.   Sensation: unable to assess  Extremities: Warm, well perfused.      TUBES/LINES:  [] Gabriel  [] Lumbar Drain  [] Wound Drains  [] Others  [x] Trach  [x] PEG     DIET:  [] NPO  [] Mechanical  [x] Tube feeds    LABS:                        9.9    7.08  )-----------( 232      ( 08 Jan 2025 05:30 )             30.4     01-08    140  |  104  |  59[H]  ----------------------------<  124[H]  3.6   |  23  |  1.35[H]    Ca    9.6      08 Jan 2025 05:30  Phos  3.7     01-08  Mg     2.0     01-08        Urinalysis Basic - ( 08 Jan 2025 05:30 )    Color: x / Appearance: x / SG: x / pH: x  Gluc: 124 mg/dL / Ketone: x  / Bili: x / Urobili: x   Blood: x / Protein: x / Nitrite: x   Leuk Esterase: x / RBC: x / WBC x   Sq Epi: x / Non Sq Epi: x / Bacteria: x          CAPILLARY BLOOD GLUCOSE          Drug Levels: [] N/A    CSF Analysis: [] N/A      Allergies    Allergy Status Unknown    Intolerances      MEDICATIONS:  Antibiotics:    Neuro:  acetaminophen   Oral Liquid .. 650 milliGRAM(s) Oral every 6 hours PRN  baclofen 5 milliGRAM(s) Oral every 12 hours  levETIRAcetam 1500 milliGRAM(s) Oral two times a day  phenytoin   Suspension 100 milliGRAM(s) Oral every 8 hours    Anticoagulation:  heparin   Injectable 5000 Unit(s) SubCutaneous every 8 hours    OTHER:  acetylcysteine 10%  Inhalation 4 milliLiter(s) Inhalation every 6 hours  albuterol/ipratropium for Nebulization 3 milliLiter(s) Nebulizer every 6 hours  amLODIPine   Tablet 5 milliGRAM(s) Oral daily  artificial tears (preservative free) Ophthalmic Solution 1 Drop(s) Right EYE every 4 hours  chlorhexidine 0.12% Liquid 15 milliLiter(s) Oral Mucosa every 12 hours  chlorhexidine 2% Cloths 1 Application(s) Topical <User Schedule>  doxazosin 8 milliGRAM(s) Oral at bedtime  erythromycin   Ointment 1 Application(s) Right EYE at bedtime  fluticasone propionate 50 MICROgram(s)/spray Nasal Spray 1 Spray(s) Both Nostrils two times a day  influenza   Vaccine 0.5 milliLiter(s) IntraMuscular once  ofloxacin 0.3% Solution 1 Drop(s) Right EYE four times a day  pantoprazole  Injectable 40 milliGRAM(s) IV Push daily  petrolatum Ophthalmic Ointment 1 Application(s) Both EYES two times a day  propranolol 10 milliGRAM(s) Oral every 8 hours  sodium chloride 0.65% Nasal 1 Spray(s) Both Nostrils two times a day    IVF:    CULTURES:    RADIOLOGY & ADDITIONAL TESTS:      ASSESSMENT:  44M PMH ?stroke/MI present to [facility] after collapsing at work. Decorticate posturing, vomiting, intubated for airway protection. 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 Transferred to [facility] for further management. s/p trach 10/14. s/p peg 10/21. Re-upgrade to ICU 2/2 desaturation event and suctioning requirements 11/15. Re-upgrade to NSICU 12/15 2/2 desaturation and tachycardia.    AMS    Intubate    Handoff    MEWS Score    Acute myocardial infarction    CVA (cerebral vascular accident)    Intracerebral hemorrhage of brain stem    Brainstem stroke    Brain stem stroke syndrome    Brain stem hemorrhage    Brain stem stroke syndrome    Hemorrhagic stroke    Brainstem stroke    Encephalopathy acute    Functional quadriplegia    Advanced care planning/counseling discussion    Encounter for palliative care    Pontine hemorrhage    Neurogenic dysphagia    Chronic respiratory failure    Acute kidney injury superimposed on CKD    Acute urinary retention    Hypertensive emergency    Sepsis, unspecified organism    Sepsis    Gram-negative bacteremia    Dyspnea    Percutaneous tracheal puncture    Altered mental status examination    EGD, with PEG    AMS    Room Service Assist    [email or web address]        PLAN:    Neuro:  - neuro/vitals q4h  - pain control: tylenol prn  - seizure tx: keppra 1500mg BID, phenytoin 100mg PO TID  - vEEG (10/3-4) negative, (10/17-10/19) negative, (12/17-12/18) negative, (12/22-12/25 ) +focal motor seizures not correlating w/ EEG  - epilepsy following  - CTH 9/30: enlarged pontine hemorrhage, CTH 10/3: stable, CTH 10/25: mostly resolved pontine hemorrhage, CTH 12/15: R mastoid air cell opacification; acute otitis media vs sterile effusion, CTH 12/18: stable.  - MRI brain 10/12: parenchymal hemorrhage, acute/subacute R cerebellar stroke    - stroke neuro signed off    CV:  - -160  - tachycardia: propranolol 10mg q8  - HTN: amlodipine 5mg  - echo (9/30) EF 75%, repeat 12/16: 57%    Resp:  - trach to vent, AC/VC 40/400/14/6  - CTA PE protocol 12/15 negative   - Secretions: duonebs/mucomyst/chest PT q6h   - CT Chest 12/24 for infectious w/u: b/l LL atelectasis and opacities     GI:  - TF via PEG (placed 10/21 by gen surg)  - bowel regimen held for loose stool, last BM 1/5  - baclofen 5q12 for hiccups     Renal:  - IVL, FW 200q6 for hydration   - Urinary retenion: Gabriel removed 1/7, passed TOV  - CKD: trend BUN/Cr  - renal US 10/1: echogenicity c/w chronic med renal dz, repeat 10/8: inc renal echogeicity, c/f medical renal disease w increased hydro     Endo:  - A1c 5.4    Heme:  - DVT ppx: SCDs, SQH 5000u q8h   - LE dopplers negative 12/16    ID:  - last pan cx 12/22  - empiric PNA: cefepime (12/22-12/30), s/p vanc (12/22-12/23), s/p zosyn (12/15-12/20), s/p vanc (12/15-12/16)  - s/p Stenotrophomonas maltophilia PNA: s/p Bactrim (11/18-11/19) s/p Cefepime (11/16-11/18)  - 11/3, (+) sputum for stenotrophomas maltophlia, blood cx (+) klebsiella, cefepime 2gq12 (11/6 - 11/12)   - empiric tx: s/p zosyn (11/3-11/6) , s/p vanc  (11/3-11/5)  - S/p Ancef (10/4-10/14) for PNA, and s/p Unasyn (10/18-10/23) +actinobacter baumanii     MISC:  - ophtho consult for keratitis  - erythromycin ointment R eye q4hrs, ofloxacin ointment R eye QID, artificial tears R eye q2hrs, moisture chamber at bedtime    Dispo: SDU status, DNR, pending PRUCOL for benefits     D/w Dr. D'Amico

## 2025-01-09 NOTE — PROGRESS NOTE ADULT - ASSESSMENT

## 2025-01-09 NOTE — PROGRESS NOTE ADULT - SUBJECTIVE AND OBJECTIVE BOX
O/N Events: GEORGE  Subjective/ROS: Unable to fully assess    VITALS  Vital Signs Last 24 Hrs  T(C): 36.1 (09 Jan 2025 08:56), Max: 36.9 (08 Jan 2025 17:26)  T(F): 96.9 (09 Jan 2025 08:56), Max: 98.5 (08 Jan 2025 17:26)  HR: 66 (09 Jan 2025 08:44) (60 - 72)  BP: 115/80 (09 Jan 2025 08:44) (106/75 - 121/80)  BP(mean): 93 (09 Jan 2025 08:44) (86 - 96)  RR: 14 (09 Jan 2025 08:44) (14 - 20)  SpO2: 97% (09 Jan 2025 08:44) (97% - 100%)    Parameters below as of 09 Jan 2025 08:44  Patient On (Oxygen Delivery Method): ventilator    O2 Concentration (%): 40    I&O's Summary    08 Jan 2025 07:01  -  09 Jan 2025 07:00  --------------------------------------------------------  IN: 2070 mL / OUT: 1160 mL / NET: 910 mL    09 Jan 2025 07:01  -  09 Jan 2025 12:01  --------------------------------------------------------  IN: 200 mL / OUT: 15 mL / NET: 185 mL        CAPILLARY BLOOD GLUCOSE          PHYSICAL EXAM  General: comfortable-appearing, no WOB on trach collar  HEENT: tracheostomy clean  Lungs: no crackles, no wheezes  Heart: regular  Abdomen: soft, no visible tenderness, + BS, +PEG  Extremities: warm, no edema, not moving to command    MEDICATIONS  (STANDING):  acetylcysteine 10%  Inhalation 4 milliLiter(s) Inhalation every 6 hours  albuterol/ipratropium for Nebulization 3 milliLiter(s) Nebulizer every 6 hours  amLODIPine   Tablet 5 milliGRAM(s) Oral daily  artificial tears (preservative free) Ophthalmic Solution 1 Drop(s) Right EYE every 4 hours  baclofen 5 milliGRAM(s) Oral every 12 hours  chlorhexidine 0.12% Liquid 15 milliLiter(s) Oral Mucosa every 12 hours  chlorhexidine 2% Cloths 1 Application(s) Topical <User Schedule>  doxazosin 8 milliGRAM(s) Oral at bedtime  erythromycin   Ointment 1 Application(s) Right EYE at bedtime  fluticasone propionate 50 MICROgram(s)/spray Nasal Spray 1 Spray(s) Both Nostrils two times a day  heparin   Injectable 5000 Unit(s) SubCutaneous every 8 hours  influenza   Vaccine 0.5 milliLiter(s) IntraMuscular once  levETIRAcetam 1500 milliGRAM(s) Oral two times a day  ofloxacin 0.3% Solution 1 Drop(s) Right EYE four times a day  pantoprazole  Injectable 40 milliGRAM(s) IV Push daily  petrolatum Ophthalmic Ointment 1 Application(s) Both EYES two times a day  phenytoin   Suspension 100 milliGRAM(s) Oral every 8 hours  propranolol 10 milliGRAM(s) Oral every 8 hours  sodium chloride 0.65% Nasal 1 Spray(s) Both Nostrils two times a day    MEDICATIONS  (PRN):  acetaminophen   Oral Liquid .. 650 milliGRAM(s) Oral every 6 hours PRN Temp greater or equal to 38.5C (101.3F), Mild Pain (1 - 3)      Allergy Status Unknown      LABS                        9.9    7.08  )-----------( 232      ( 08 Jan 2025 05:30 )             30.4     01-08    140  |  104  |  59[H]  ----------------------------<  124[H]  3.6   |  23  |  1.35[H]    Ca    9.6      08 Jan 2025 05:30  Phos  3.7     01-08  Mg     2.0     01-08        Urinalysis Basic - ( 08 Jan 2025 05:30 )    Color: x / Appearance: x / SG: x / pH: x  Gluc: 124 mg/dL / Ketone: x  / Bili: x / Urobili: x   Blood: x / Protein: x / Nitrite: x   Leuk Esterase: x / RBC: x / WBC x   Sq Epi: x / Non Sq Epi: x / Bacteria: x            IMAGING/EKG/ETC: reviewed

## 2025-01-10 PROCEDURE — 99233 SBSQ HOSP IP/OBS HIGH 50: CPT

## 2025-01-10 PROCEDURE — 99231 SBSQ HOSP IP/OBS SF/LOW 25: CPT

## 2025-01-10 RX ADMIN — Medication 100 MILLIGRAM(S): at 12:57

## 2025-01-10 RX ADMIN — FLUTICASONE PROPIONATE 1 SPRAY(S): 50 SPRAY, METERED NASAL at 05:57

## 2025-01-10 RX ADMIN — Medication 1 DROP(S): at 05:57

## 2025-01-10 RX ADMIN — Medication 100 MILLIGRAM(S): at 05:55

## 2025-01-10 RX ADMIN — HEPARIN SODIUM 5000 UNIT(S): 1000 INJECTION INTRAVENOUS; SUBCUTANEOUS at 14:03

## 2025-01-10 RX ADMIN — Medication 1 DROP(S): at 01:01

## 2025-01-10 RX ADMIN — OFLOXACIN 1 DROP(S): 3 SOLUTION OPHTHALMIC at 05:56

## 2025-01-10 RX ADMIN — Medication 1 APPLICATION(S): at 18:58

## 2025-01-10 RX ADMIN — OFLOXACIN 1 DROP(S): 3 SOLUTION OPHTHALMIC at 13:02

## 2025-01-10 RX ADMIN — OFLOXACIN 1 DROP(S): 3 SOLUTION OPHTHALMIC at 23:10

## 2025-01-10 RX ADMIN — ERYTHROMYCIN 1 APPLICATION(S): 5 OINTMENT OPHTHALMIC at 21:20

## 2025-01-10 RX ADMIN — IPRATROPIUM BROMIDE AND ALBUTEROL SULFATE 3 MILLILITER(S): .5; 2.5 SOLUTION RESPIRATORY (INHALATION) at 12:59

## 2025-01-10 RX ADMIN — LEVETIRACETAM 1500 MILLIGRAM(S): 10 INJECTION, SOLUTION INTRAVENOUS at 05:56

## 2025-01-10 RX ADMIN — DOXAZOSIN MESYLATE 8 MILLIGRAM(S): 8 TABLET ORAL at 21:20

## 2025-01-10 RX ADMIN — Medication 40 MILLIGRAM(S): at 13:01

## 2025-01-10 RX ADMIN — BACLOFEN 5 MILLIGRAM(S): 10 INJECTION INTRATHECAL at 18:22

## 2025-01-10 RX ADMIN — HEPARIN SODIUM 5000 UNIT(S): 1000 INJECTION INTRAVENOUS; SUBCUTANEOUS at 21:23

## 2025-01-10 RX ADMIN — Medication 100 MILLIGRAM(S): at 21:21

## 2025-01-10 RX ADMIN — HEPARIN SODIUM 5000 UNIT(S): 1000 INJECTION INTRAVENOUS; SUBCUTANEOUS at 05:55

## 2025-01-10 RX ADMIN — IPRATROPIUM BROMIDE AND ALBUTEROL SULFATE 3 MILLILITER(S): .5; 2.5 SOLUTION RESPIRATORY (INHALATION) at 05:55

## 2025-01-10 RX ADMIN — Medication 1 SPRAY(S): at 05:57

## 2025-01-10 RX ADMIN — IPRATROPIUM BROMIDE AND ALBUTEROL SULFATE 3 MILLILITER(S): .5; 2.5 SOLUTION RESPIRATORY (INHALATION) at 23:09

## 2025-01-10 RX ADMIN — Medication 15 MILLILITER(S): at 18:12

## 2025-01-10 RX ADMIN — LEVETIRACETAM 1500 MILLIGRAM(S): 10 INJECTION, SOLUTION INTRAVENOUS at 18:12

## 2025-01-10 RX ADMIN — IPRATROPIUM BROMIDE AND ALBUTEROL SULFATE 3 MILLILITER(S): .5; 2.5 SOLUTION RESPIRATORY (INHALATION) at 18:07

## 2025-01-10 RX ADMIN — ACETYLCYSTEINE 4 MILLILITER(S): 200 INHALANT RESPIRATORY (INHALATION) at 23:10

## 2025-01-10 RX ADMIN — ACETYLCYSTEINE 4 MILLILITER(S): 200 INHALANT RESPIRATORY (INHALATION) at 18:11

## 2025-01-10 RX ADMIN — Medication 1 DROP(S): at 21:22

## 2025-01-10 RX ADMIN — Medication 1 DROP(S): at 18:09

## 2025-01-10 RX ADMIN — AMLODIPINE BESYLATE 5 MILLIGRAM(S): 10 TABLET ORAL at 05:56

## 2025-01-10 RX ADMIN — Medication 1 DROP(S): at 10:23

## 2025-01-10 RX ADMIN — Medication 15 MILLILITER(S): at 05:56

## 2025-01-10 RX ADMIN — BACLOFEN 5 MILLIGRAM(S): 10 INJECTION INTRATHECAL at 05:55

## 2025-01-10 RX ADMIN — FLUTICASONE PROPIONATE 1 SPRAY(S): 50 SPRAY, METERED NASAL at 18:08

## 2025-01-10 RX ADMIN — ACETYLCYSTEINE 4 MILLILITER(S): 200 INHALANT RESPIRATORY (INHALATION) at 13:01

## 2025-01-10 RX ADMIN — Medication 1 APPLICATION(S): at 05:56

## 2025-01-10 RX ADMIN — OFLOXACIN 1 DROP(S): 3 SOLUTION OPHTHALMIC at 18:10

## 2025-01-10 RX ADMIN — Medication 1 DROP(S): at 14:03

## 2025-01-10 RX ADMIN — Medication 1 SPRAY(S): at 18:08

## 2025-01-10 RX ADMIN — ACETYLCYSTEINE 4 MILLILITER(S): 200 INHALANT RESPIRATORY (INHALATION) at 05:55

## 2025-01-10 NOTE — PROGRESS NOTE ADULT - SUBJECTIVE AND OBJECTIVE BOX
HPI:  Unknown age male, unknown past medical history (reported stroke and MI by coworkers) presented to [facility] with AMS, Pt was working at [employer] when he was found down by coworkers. EMS called and pt brought to [facility] ED. Intubated, sedated, started on cardene for SBPs in 200s. CT head showed brain stem bleed. Transferred to NSICU for further management.  (30 Sep 2024 12:55)    OVERNIGHT EVENTS: GEORGE overnight, neuro stable.    Hospital Course:   9/30: transferred from [facility]. A line placed. Versed dc'd. Cy Rader at bedside, states pt has family in [place], cannot confirm medications or PMH other than stroke and MI. 250cc bolus 3% given. LR switched to NS. hydralazine 25q8 started, 3% started, switched propofol to precedex   10/1: stability CTH done. Added labetalol, started TF. Palliative consulted. ethics consulted to determine surrogate. febrile 103, pan cx sent  10/2: BD 2, GEORGE overnight. TF resumed. Desatt'd to 80s, FiO2 inc. to 50. Fentanyl given, ABG, CXR ordered. Maxxed on precedex, started on propofol for DARIEN -4 - -5. Precedex dc'd. Duonebs, mucomyst, hypertonic added. 3% dc'd. Cardene dc'd. Start vanc/CTX. Increased labetalol 200q8. MRSA negative, dc'd vanc. ETT pulled back 2cm x 2, good positioning after confirmatory chest xray. Ethics attempting to establish HCP with family. Na 159, starting FW 250q6 for range 150-155.   10/3: BD3, GEORGE o/n, neuro stable. Na elevating, FW increased to 300q6. Dc'd bowel reg for diarrhea. vEEG started. SQH 5000q8 tonight.   10/4: BD 4, albumn bolus, incr. LR to 80 2/2 incr. in Cr, LR to 10 0cc/hr for uptrending Cr. Started 7% hypersal for 48hrs and SL atropine for inline/oral thick secretions. Dc'd CTX and started ancef for MSSA in the sputum. Nephrology consulted for CKD, f/u recs. SBP 170s, given hydralazine 10mg IVP.   10/5: BD5, o/n 10mg IVP hydralazine given for SBP 170s and started on hydralazine 25q8 via OGT. 10mg IV push labetalol for SBP > 160s. RT placed for diarrhea.   10/6: BD6, o/n FW increased to 350q4 per nephrology recs. IV tylenol for temp 100.6, SBp 160s presumed uncomfortable.   10/7: BD7, overnight pancultured for temp 101.8F.   10/8: BD8. GEORGE. Cr bumped. decreased LR to 75cc/hr. Adding simethicone ATC. incr hydralazine 50mgTID. Incr labetalol 300mgTID. Na 145, decreased FWF to 250q6. Start precedex. FENa consistent with intrinsic kidney injury. Pend repeat renal US. Retaining up to 1.3L, bladder scans q6, straight cath PRN  10/9: BD 9. GEORGE overnight. Neuro stable. abd xray for distention w non-specific gas pattern, OGT to LIWS for morning. duonebs/mucomyst to q8 for improving secretions. Changed tube feeds to Jevity 1.5 20cc/hr, low rate due to abdominal distention, nepro dense and more difficult to digest. Tolerating CPAP, confirmed by ABG.   10/10: BD 10. GEORGE overnight. Neuro stable. (+) gabriel for urinary retention on bladder scan. inc TF to goal rate of 40cc/hr. family leaning toward pursuing trach/PEG. 1/2 amp for FS 81.   10/11: BD 11. GEORGE overnight. Neuro stable. Trach/PEG consults placed.   10/12: BD 12. GEORGE overnight. Neuro stable. MRI brain complete.   10/13: BD 13. Increase flomax. Hold SQH after PM dose for trach tm. IVL.   10/14: BD 14. GEORGE overnight, remains on AC/VC. Gabriel placed for urinary retention. Dc'd free water.  S/p trach with pulm. NGT placed and CXR confirmed in good position.   10/15: BD 15, GEORGE ovn. resumed feeds. spiked 101, pan cx sent.   10/16: BD 16. GEORGE ovn. Lokelma 5mg for K+ 5.4. Started vanc q 24/zosyn for empiric PNA coverage, IVF to 100/hr. PEG held for fever.   10/17: BD 17,  ordered serum osm and urine osm for am. Started sinemet for neurostimulation. Increased cardura to 0.8. Started FW 100q4, dc'd IVF. MRSA negative, dc'd vanc. NGT replaced d/t coiling.   10/18: BD 18, GEORGE overnight, neuro stable. Amantadine added for neurostim. zosyn changed to unasyn for acinetobacter baumannii, failed TOV and required SC  10/19: BD 19, GEORGE ovn. cardura 2mg added for retention. labetalol decreased 200q8, hydralazine decreased 25q8. Gabriel replaced.   10/20: BD20, GEORGE overnight. NGT dislodged, replaced. PEG tomorrow w/ gen surg, FW increased to 150q4 and labetalol decreased to 100q8, lokelma given for hyperkalemia.   10/21: BD 21. POD0 PEG placement with Gen surg. decr labetolol to 50q8, incr. cardura to 0.4, started lokelma and phoslo, dc gabriel POD0 PEG placement with Gen surg.  10/22: BD 22. Plan to start TF today via PEG. dc labetalol, Following ophtho recs. Increased apnea settings - found to be in cheyne-moe respiration. CPAP 5/5.  10/23: BD 23. hydralazine d/c'd, trach collar trial today. Rectal tube placed at 6am.  10/24: BD24, o/n lokelma held due to diarrhea. Free water 100q6 resumed. dc'd tamsulosin, amantadine. Incr'd cardura to 8mg qhs. Dc'd FW. Switched jevity to nepro. gabriel placed for high urine output. Started SL atropine for oral secretions. Dc'd free water.  10/25: BD25, o/n decreased suctioning requirements to > q4hrs, GEORGE. Cr improving, cont phoslo, lokelma held at this time. Gabriel placed yest, cont. Tolerating trach collar. Given 500cc plasmalyte bolus for ANIKA. Dc'd sinemet.   10/26: BD26, o/n resumed lokelma 5mg daily and resumed 100cc free water q6hrs. Change in neuro status with new right pupillary dilation with anisocoria (right pupil 6mm fixed and left pupil 3mm briskly reactive). Given 23.4% NaCl bullet, taken for emergent CTH showing mostly resolved pontine hemorrhage, continued brainstem hypodensity likely edema d/t hemorrhage, no new hemorrhage or infarct, no herniation, mild increase in size of left lateral ventricle. Vitals remaine stable. Na goal > 140.   10/27: BD27, o/n GEORGE.Neuro stable. Pend stepdown with airway bed.   10/28: BD 28. GEORGE overnight. Neuro stable. Miralax ordered. Gabriel removed, pending TOV.  10/29: BD 29. GEORGE o/n. Given 2L NS over 8 hrs for increased BUN/Cr ratio. Gabriel placed for frequent straight cath.   10/30: BD 30.   10/31: BD 31. GEORGE overnight. Na 149, increased free water to 200q6. 1L NS for uptrending BUN.   11/1: BD 32. GEORGE overnight. Given 1L NS for dehydration. Na 146, increased FW to 250q6.   11/2: BD 33 GEORGE overnight, neuro stable, given 500cc bolus for net negative status and tachycardia   11/3: BD 34, GEORGE overnight, neuro stable. Patient remains tachycardic, EKG showing sinus tachycardia, given additional 500cc NS bolus. Febrile to 101.9F, pan cultured (without UA), CXR WNL, given tylenol.   11/4: BD 35, GEORGE overnight, neuro stable. Given 1L NS for tachycardia. sputum (+) for stenotropohomas maltophilia.   11/5: BD 36 GEORGE overnight, neuro stable. Vancomycin dc'd. Chest PT BID. ID consulted, cont zosyn.  11/6: BD 37. blood cx + klebsiella dc zosyn changed to cefepime, CTAP ordered, rpt blood cx sent.    11/7: BD 38. Pending CT A/P, given 250cc bolus and starting maintenance fluids overnight. Pending CT A/P after bolus   11/8: BD 39. CT CAP negative for infection.   11/9: BD 40. GEORGE overnight.  11/10: BD 41. GEORGE overnight. desat to 85 on trach collar, O2 inc to 10L and 100%, O2 sat inc to 95. pt tachy to 110s, euvolemic. given tylenol. ABG and CXR ordered. spiked fever, pancultured, RVP negative. AM ABG w pO2 79, rpt w pO2 79. pt appears comfortable, satting 94%.   11/11: BD 42. GEORGE overnight. pt became tachy to 130s, desat to 90 on 100% FiO2 and 10L. suctioned, (+) productive cough. temp 101.4, given 1g IV tylenol and 500cc NS bolus for euvolemia. fever and HR downtrending. LE dopplers negative for dvt  11/12: BD 43, GEORGE ovn, fever and HR downtrending, satting 97% 70% FIO2  11/13: BD 44, GEORGE ovn. started standing tylenol x24 hours for tachycardia. desat to 80s, o2 increased. CXR stable, pending CTA PE protocol.   11/14: BD 45, GEORGE overnight, neuro stable. resp therapy dec FiO2 to 70%.   11/15: BD 46, GEORGE overnight, neuro stable.  Rapid called for desaturation 30s, tachycardic 140s. Patient bagged, 100% fio2, heavily suctioned. CXR/POCUS unremarkable. ABG c/w desaturation. WBC 14.71. Afebrile. O2 improved to 90s and patient upgraded to ICU. ABG paO2 30s improved to 89 on vent. IV Tylenol x 1, sputum sent. Start protonix while o-n vent.   11/16: BD 47. POCUS showed collapsable IVCF, given 1L bolus. Vanco/Cefpime added empriic for PNA, NGT feeds restarted. MRSA swab neg, Vanc DC'd.   11/17: BD 48. GEORGE overnight. 1l bolus for tachycardia. Spiked to 101, cultured. 500cc bolus for tachycardia, tachy to 148 given 25mcg fentanyl, 250cc albumin, 1.5L bolus. 5 IV lopressor with response HR to 100s. +Stenotrophomonas on sputum cx.   11/18: BD 49. GEORGE overnight. Consulted ID, cefepime switched to bactrim x7days. Started hydrochlorothiazine 12.5mg daily.  11/19: BD 50. Tachy 120s, given tylenol and 500cc NS. Tolerating 5/5, switched to TCx. Holding phos binder. D/c Bactrim. D/c gabriel, f/u TOV. Dc'd PPI.   11/20: BD 51. GEORGE ovn. 1600 satting low 90s, mildly tachy to 110s, afebrile, RR wnl. O2 improved to mid 90s while inc O2 to 100% on TCx. CPAP 5/5 placed back on.  11/21: BD 52, GEORGE ovn, tolerating CPAP 5/5. Switch to trach collar during the day if tolerating well. HCTZ held for Cr bump, straight cath frequence increased to q4  11/22: BD 53, GEORGE ovn. Resumed phoslo. Gabriel placed. Resumed HCTZ.   11/23: BD 54. Holding tylenol in setting of possible fever, will require pan cx if febrile. Cr improved today. Cont CPAP. Bowel regimen held i/s/o diarrhea. FOBT negative.  11/24: BD 55. GEORGE overnight. Neuro stable. HCTZ dc'ed, started lisinopril 5. Lokelma dc'ed for K 3.7.   11/25: BD 56, GEORGE overnight. Neuro stable. dc'd lisinopril 5mg. Gabriel dc'd. TOV. 1545 noted to be hypotensive, MAP 50, in supine position on chair, HR 60s, afebrile, O2 96%. Given 1L cc NS bolus, placed back on bed in reverse trendelenberg, improved to map of 66. Neostick at bedside. Vitals check q1h. Dc'ed amlodipine. Failed TOV, bladder scan q6, sc prn. Added back Senna.   11/26: BD 57, GEORGE overnight. Neuro stable. Dc'd phoslo.   11/27: BD 58, GEORGE overnight. Neuro stable.    11/28: BD 59. Gabriel replaced.   11/29: GEORGE.  11/30: GEORGE, neuro stable.   12/1: BD 62, GEORGE overnight  12/2: BD 63, GEORGE overnight.; Given 1L bolus of LR for uptrending BUN/Cr.  12/3: BD 64. Reinstated eye gtt/moisture chamber given increased Rt eye injection  12/4: BD 65. GEORGE overnight. Attempted to speak with ophtho regarding eyelid weight/closure but no answer, full mailbox.   12/5: BD 66, GEORGE overnight, bowel regimen increased and had BM.   12/6: BD 67, GEORGE overnight, neuro stable.  12/7: GEORGE overnight, neuro stable. /110s, given x1 hydralazine 10 mg IVP. Restarted home amlodipine 5mg.  12/8: GEORGE. OOB to chair.     12/11: GEORGE, mucomyst added for thick secretions, simethicone for abd distension, abd xray with stool burden, increased bowel regimen.   12/12: GEORGE overnight.   12/13: GEORGE overnight.   12/14: GEORGE overnight  12/15: o/n Patient became tachycardic HR 120s and 10 minutes later O2 sat dropped as low as 89%. Patient suctioned without improvement in O2 sat and tube feeds found in suction catheter. TFs held.  STAT CXR ordered. STAT labs sent. Respiratory therapist called to bedside and patient trach connected to ventilator. After connection to ventilator and further suctioning O2 sat improved to 97% but patient HR remains 120-130s. Upgraded to NSICU for further management. Vancomycin and zosyn started. CTA  chest PE protocol and CTH ordered. Blood cultures sent.given 500cc bolus, rpt ABG sent pO2 243, CTH and CTA chest done. FS while NPO, FiO2 dec 50 pending ABG. sputum cx positive for few GPC and GNR.   12/16: GEORGE overnight, restarted amlodipine, troponin 75   12/17: GEORGE overnight, neuro stable. Attempted CPAP this morning, did not tolerate and back on full vent support. Dc'd Vanc. Tachycardia to 140s, noted extremities to be twitching along with jaw twitching. Given 2g of Keppra, total 4mg ativan and placed on EEG, full set of labs and lactate negative. Resumed trickle feeds at 20cc/hr. Given 250cc albumin for tachycardia.   12/18: GEORGE overnight. Neuro stable. CTH stable. EEG negative and dc'd.   12/19: GEORGE overnight. neuro stable. SIMV most of day, AC/VC at night.   12/20: GEORGE overnight, neuro stable. remains on VC/AC  12/21: GEORGE ovn. 1L bolus for tachy to 120s-130s.   12/22: GEORGE ovn. Tachy to 120s, given tylenol and IVF. 2mg IV ativan given for L jaw twitching. Sx resolved for 3 minutes and started again. Epilepsy contacted. Given 2mg IV ativan. Continued twitching. Increased keppra to 1500mg BID, 2mg ativan given. Propranolol started for refractory tachycardia. vEEG ordered. albumin given. POCUS performed, no b lines. Started on fosphenytoin, loaded w 20mg/kg then 100mg TID. febrile, pancx, started cefepime 2g q8, vancomycin  12/23: GEORGE ovn. +focal motor seizures on eeg. ID consulted. Vancomycin dc'ed as per ID.  12/24: GEORGE ovn, neuro stable. Baclofen 5 mg q12 started for hiccups. Na 129 from 134. Urine lytes c/w SIADH. Given 250cc 3% bolus. CT chest for infection w/u - f/u read. Repeat Na 136. UA negative  12/25: GEORGE ovn.   12/26: GEORGE ovn  12/27: GEORGE overnight. Blood cx neg @ 4 days, DC cefepime per medicine (sputum colonized).   12/28: Desaturation o/n to 80's, CXR obtained, pulse ox changed and sats resolved. Na 133 from 138, 250cc 3% bolus given. Cefepime resumed until 12/30 per ID. Rpt Na 138.  12/29: GEORGE overnight. Na stable.   12/30: GEORGE overnight. Family meeting with son, pt now DNR.   12/31: GEORGE overnight.   1/1: GEORGE overnight, neuro stable.  1/2: GEORGE overnight, neuro stable. FW decreased to 100q6.   1/3: GEORGE overnight, neuro stable. fosphenytoin IV changed to pheytoin PO via PEG per epilepsy recommendations  1/4: GEORGE overnight, neuro stable. FW incr. to 100q4  1/5: GEORGE overnight, neuro stable.   1/6: GEORGE overnight, neuro stable   1/7: GEORGE overnight, neuro stable. BUN/Cr increased, increased FW to 200q6. Given 1L bolus of LR over 2 hours. Gabriel d/c'd, voiding, continue bladder scan q6.   1/8: GEORGE overnight, neuro stable. BUN/Cr improving.  1/9: GEORGE overnight, neuro stable.   1/10: GEORGE overnight, neuro stable.     Vital Signs Last 24 Hrs  T(C): 37.1 (09 Jan 2025 22:07), Max: 37.1 (09 Jan 2025 16:00)  T(F): 98.8 (09 Jan 2025 22:07), Max: 98.8 (09 Jan 2025 22:07)  HR: 68 (10 Sourav 2025 01:01) (60 - 72)  BP: 107/66 (10 Sourav 2025 00:10) (106/75 - 130/91)  BP(mean): 82 (10 Sourav 2025 00:10) (82 - 107)  RR: 14 (10 Sourav 2025 01:01) (14 - 20)  SpO2: 97% (10 Sourav 2025 01:01) (96% - 100%)    Parameters below as of 10 Sourav 2025 01:01  Patient On (Oxygen Delivery Method): ventilator    O2 Concentration (%): 40    I&O's Summary    08 Jan 2025 07:01  -  09 Jan 2025 07:00  --------------------------------------------------------  IN: 2070 mL / OUT: 1160 mL / NET: 910 mL    09 Jan 2025 07:01  -  10 Sourav 2025 01:56  --------------------------------------------------------  IN: 1620 mL / OUT: 865 mL / NET: 755 mL        PHYSICAL EXAM:  Constitutional: NAD, lying in bed.  HEENT: Pupils equal, round, reactive to light.   Respiratory: +Trach. No respiratory distress, symmetric chest rise  Cardiovascular: Regular rate and rhythm    Gastrointestinal: +PEG, Abdomen soft, nondistended.  Neurological:  AAOX0-1. Opens eyes. Tracks vertically  Motor: RUE squeezes hand to comman, LUE 0/5, B/l LE w/d.   Sensation: unable to assess  Extremities: Warm, well perfused.      TUBES/LINES:  [] Gabriel  [] Lumbar Drain  [] Wound Drains  [] Others  [x] Trach  [x] PEG     DIET:  [] NPO  [] Mechanical  [x] Tube feeds      LABS:                        9.9    7.08  )-----------( 232      ( 08 Jan 2025 05:30 )             30.4     01-08    140  |  104  |  59[H]  ----------------------------<  124[H]  3.6   |  23  |  1.35[H]    Ca    9.6      08 Jan 2025 05:30  Phos  3.7     01-08  Mg     2.0     01-08        Urinalysis Basic - ( 08 Jan 2025 05:30 )    Color: x / Appearance: x / SG: x / pH: x  Gluc: 124 mg/dL / Ketone: x  / Bili: x / Urobili: x   Blood: x / Protein: x / Nitrite: x   Leuk Esterase: x / RBC: x / WBC x   Sq Epi: x / Non Sq Epi: x / Bacteria: x          CAPILLARY BLOOD GLUCOSE          Drug Levels: [] N/A    CSF Analysis: [] N/A      Allergies    Allergy Status Unknown    Intolerances      MEDICATIONS:  Antibiotics:    Neuro:  acetaminophen   Oral Liquid .. 650 milliGRAM(s) Oral every 6 hours PRN  baclofen 5 milliGRAM(s) Oral every 12 hours  levETIRAcetam 1500 milliGRAM(s) Oral two times a day  phenytoin   Suspension 100 milliGRAM(s) Oral every 8 hours    Anticoagulation:  heparin   Injectable 5000 Unit(s) SubCutaneous every 8 hours    OTHER:  acetylcysteine 10%  Inhalation 4 milliLiter(s) Inhalation every 6 hours  albuterol/ipratropium for Nebulization 3 milliLiter(s) Nebulizer every 6 hours  amLODIPine   Tablet 5 milliGRAM(s) Oral daily  artificial tears (preservative free) Ophthalmic Solution 1 Drop(s) Right EYE every 4 hours  chlorhexidine 0.12% Liquid 15 milliLiter(s) Oral Mucosa every 12 hours  chlorhexidine 2% Cloths 1 Application(s) Topical <User Schedule>  doxazosin 8 milliGRAM(s) Oral at bedtime  erythromycin   Ointment 1 Application(s) Right EYE at bedtime  fluticasone propionate 50 MICROgram(s)/spray Nasal Spray 1 Spray(s) Both Nostrils two times a day  influenza   Vaccine 0.5 milliLiter(s) IntraMuscular once  ofloxacin 0.3% Solution 1 Drop(s) Right EYE four times a day  pantoprazole  Injectable 40 milliGRAM(s) IV Push daily  petrolatum Ophthalmic Ointment 1 Application(s) Both EYES two times a day  propranolol 10 milliGRAM(s) Oral every 8 hours  sodium chloride 0.65% Nasal 1 Spray(s) Both Nostrils two times a day    IVF:    CULTURES:    RADIOLOGY & ADDITIONAL TESTS:      ASSESSMENT:  44M PMH ?stroke/MI present to [facility] after collapsing at work. Decorticate posturing, vomiting, intubated for airway protection. 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 Transferred to [facility] for further management. s/p trach 10/14. s/p peg 10/21. Re-upgrade to ICU 2/2 desaturation event and suctioning requirements 11/15. Re-upgrade to NSICU 12/15 2/2 desaturation and tachycardia.    AMS    Intubate    Handoff    MEWS Score    Acute myocardial infarction    CVA (cerebral vascular accident)    Intracerebral hemorrhage of brain stem    Brainstem stroke    Brain stem stroke syndrome    Brain stem hemorrhage    Brain stem stroke syndrome    Hemorrhagic stroke    Brainstem stroke    Encephalopathy acute    Functional quadriplegia    Advanced care planning/counseling discussion    Encounter for palliative care    Pontine hemorrhage    Neurogenic dysphagia    Chronic respiratory failure    Acute kidney injury superimposed on CKD    Acute urinary retention    Hypertensive emergency    Sepsis, unspecified organism    Sepsis    Gram-negative bacteremia    Dyspnea    Percutaneous tracheal puncture    Altered mental status examination    EGD, with PEG    AMS    Room Service Assist    [email or web address]        PLAN:    Neuro:  - neuro/vitals q4h  - pain control: tylenol prn  - seizure tx: keppra 1500mg BID, phenytoin 100mg PO TID  - vEEG (10/3-4) negative, (10/17-10/19) negative, (12/17-12/18) negative, (12/22-12/25 ) +focal motor seizures not correlating w/ EEG  - epilepsy following  - CTH 9/30: enlarged pontine hemorrhage, CTH 10/3: stable, CTH 10/25: mostly resolved pontine hemorrhage, CTH 12/15: R mastoid air cell opacification; acute otitis media vs sterile effusion, CTH 12/18: stable.  - MRI brain 10/12: parenchymal hemorrhage, acute/subacute R cerebellar stroke    - stroke neuro signed off    CV:  - -160  - tachycardia: propranolol 10mg q8  - HTN: amlodipine 5mg  - echo (9/30) EF 75%, repeat 12/16: 57%    Resp:  - trach to vent, AC/VC 40/400/14/6  - CTA PE protocol 12/15 negative   - Secretions: duonebs/mucomyst/chest PT q6h   - CT Chest 12/24 for infectious w/u: b/l LL atelectasis and opacities     GI:  - TF via PEG (placed 10/21 by gen surg)  - bowel regimen held for loose stool, last BM 1/5  - baclofen 5q12 for hiccups     Renal:  - IVL, FW 200q6 for hydration   - Urinary retenion: Gabriel removed 1/7, passed TOV  - CKD: trend BUN/Cr  - renal US 10/1: echogenicity c/w chronic med renal dz, repeat 10/8: inc renal echogeicity, c/f medical renal disease w increased hydro     Endo:  - A1c 5.4    Heme:  - DVT ppx: SCDs, SQH 5000u q8h   - LE dopplers negative 12/16    ID:  - last pan cx 12/22  - empiric PNA: cefepime (12/22-12/30), s/p vanc (12/22-12/23), s/p zosyn (12/15-12/20), s/p vanc (12/15-12/16)  - s/p Stenotrophomonas maltophilia PNA: s/p Bactrim (11/18-11/19) s/p Cefepime (11/16-11/18)  - 11/3, (+) sputum for stenotrophomas maltophlia, blood cx (+) klebsiella, cefepime 2gq12 (11/6 - 11/12)   - empiric tx: s/p zosyn (11/3-11/6) , s/p vanc  (11/3-11/5)  - S/p Ancef (10/4-10/14) for PNA, and s/p Unasyn (10/18-10/23) +actinobacter baumanii     MISC:  - ophtho consult for keratitis  - erythromycin ointment R eye q4hrs, ofloxacin ointment R eye QID, artificial tears R eye q2hrs, moisture chamber at bedtime    Dispo: SDU status, DNR, pending PRUCOL for benefits     D/w Dr. D'Amico

## 2025-01-10 NOTE — PROGRESS NOTE ADULT - ASSESSMENT
44M with unclear PMH (?stroke, MI) who was found down at work, intubated for airway protection and found to have acute parenchymal hemorrhage within nabil with mass effect (+ acute/subacute right cerebellar infarct) in setting of hypertensive emergency, transferred to [facility] for further neurosurgical care. Hospital course c/b poor neurologic recovery s/p trach-PEG, AUR s/p gabriel, ANIKA on CKD c/b hyperkalemia, HAP s/p amp-sulbactam (EOT 10/23), K aerogenes bacteremia  treated with 2 weeks of Cefepime per ID , On 11/15 he became septic and was transferred to NSICU due to increased o2 requirements and needed Vent support , Trach Cultures + for Stenotrophomonas which was treated with 7 days of Bactrim per ID , has been weaned of Vent since 11/23 and is now on 10lts at 40% o2 through Trach Collar. Stepped up to the NSICU on 12/15 for hypoxia and tachycardia. PE ruled out. On abx for 5 days. Unclear etiology. Now stepped down to 8Lach.    # Pontine hemorrhage  # Encephalopathy due to Intracranial Bleed   - Acute parenchymal hemorrhage within nabil with mass effect (+ acute/subacute right cerebellar infarct) in setting of hypertensive emergency.   - MRI brain also demonstrated acute/subacute R cerebellar stroke.   - No Neurosurgical Interventions were performed   - Secondary to IPH and cerebellar stroke patient has become Trach and Peg Dependent    # Hyponatremia (resolved)  - Continue to monitor Na    #Acute kidney injury  # Acute Tubular Necrosis superimposed on CKD stage III  - s/p US renal with chronic medical renal disease  - Continue on Free water 200 q6h. Monitor UOP, bladder scan per protocol. Further management based on creatinine trend.    # Seizures  - Continue Seizure precautions   - Continue  Keppra and Fosphenytoin     # Suspected Hospital Acquired Pneumonia ( Resolved)  - Pt had a CT Chest on 12/24 with evidence of Bilateral lower lobe infiltrates   - Pt completed the abx tx course of Cefepime on12/30.  -  Continue to monitor vent requirement, WBC and fever curve  - Care per nursing, chest PT     # Hypertension  - Continue amlodipine 5mg daily with holding parameters  - Will continue to monitor BP and titrate  antihypertensive as required     # Chronic respiratory failure.   - S/p percutaneous trach by pulm on 10/14/24  - Currently on Vent Support   - Continue Chest PT    # Neurogenic dysphagia.   - Pt s/p PEG with surgery 10/21/24  - TF per nutrition  - aspiration precautions and elevation of HOB.    #Acute urinary retention.   - doxazosin 8mg  - follow-up UOP per above     # Functional quadriplegia in setting of brainstem hemorrhage  - decub precautions  - care per nursing protocol     # DVT prop: Heparin SQ q8, discussed with primary team,    Dispo: Pending PRUCOL process and filing for Medicaid for long term placement in facility. Given complexity of care, in addition to complex disposition planning, would recommend patient to continue on the current level of care.

## 2025-01-10 NOTE — PROGRESS NOTE ADULT - SUBJECTIVE AND OBJECTIVE BOX
O/N Events: GEORGE  Subjective/ROS: No complaints. Denies HA, CP, SOB, n/v, changes in bowel/urinary habits.  12pt ROS otherwise negative.    VITALS  Vital Signs Last 24 Hrs  T(C): 36.7 (10 Sourav 2025 08:49), Max: 37.1 (09 Jan 2025 16:00)  T(F): 98 (10 Sourav 2025 08:49), Max: 98.8 (09 Jan 2025 22:07)  HR: 60 (10 Sourav 2025 08:22) (58 - 72)  BP: 126/82 (10 Sourav 2025 08:22) (101/75 - 130/91)  BP(mean): 98 (10 Sourav 2025 08:22) (82 - 107)  RR: 14 (10 Sourav 2025 08:22) (14 - 18)  SpO2: 99% (10 Sourav 2025 08:22) (96% - 99%)    Parameters below as of 10 Sourav 2025 08:22  Patient On (Oxygen Delivery Method): ventilator    O2 Concentration (%): 40    I&O's Summary    09 Jan 2025 07:01  -  10 Sourav 2025 07:00  --------------------------------------------------------  IN: 2030 mL / OUT: 1090 mL / NET: 940 mL    10 Sourav 2025 07:01  -  10 Sourav 2025 10:25  --------------------------------------------------------  IN: 0 mL / OUT: 300 mL / NET: -300 mL    CAPILLARY BLOOD GLUCOSE    PHYSICAL EXAM  General: comfortable-appearing, no WOB on trach collar  HEENT: tracheostomy clean  Lungs: no crackles, no wheezes  Heart: regular  Abdomen: soft, no visible tenderness, + BS, +PEG  Extremities: warm, no edema, not moving to command    MEDICATIONS  (STANDING):  acetylcysteine 10%  Inhalation 4 milliLiter(s) Inhalation every 6 hours  albuterol/ipratropium for Nebulization 3 milliLiter(s) Nebulizer every 6 hours  amLODIPine   Tablet 5 milliGRAM(s) Oral daily  artificial tears (preservative free) Ophthalmic Solution 1 Drop(s) Right EYE every 4 hours  baclofen 5 milliGRAM(s) Oral every 12 hours  chlorhexidine 0.12% Liquid 15 milliLiter(s) Oral Mucosa every 12 hours  chlorhexidine 2% Cloths 1 Application(s) Topical <User Schedule>  doxazosin 8 milliGRAM(s) Oral at bedtime  erythromycin   Ointment 1 Application(s) Right EYE at bedtime  fluticasone propionate 50 MICROgram(s)/spray Nasal Spray 1 Spray(s) Both Nostrils two times a day  heparin   Injectable 5000 Unit(s) SubCutaneous every 8 hours  influenza   Vaccine 0.5 milliLiter(s) IntraMuscular once  levETIRAcetam 1500 milliGRAM(s) Oral two times a day  ofloxacin 0.3% Solution 1 Drop(s) Right EYE four times a day  pantoprazole  Injectable 40 milliGRAM(s) IV Push daily  petrolatum Ophthalmic Ointment 1 Application(s) Both EYES two times a day  phenytoin   Suspension 100 milliGRAM(s) Oral every 8 hours  propranolol 10 milliGRAM(s) Oral every 8 hours  sodium chloride 0.65% Nasal 1 Spray(s) Both Nostrils two times a day    MEDICATIONS  (PRN):  acetaminophen   Oral Liquid .. 650 milliGRAM(s) Oral every 6 hours PRN Temp greater or equal to 38.5C (101.3F), Mild Pain (1 - 3)      Allergy Status Unknown      LABS                    IMAGING/EKG/ETC: reviewed

## 2025-01-10 NOTE — CHART NOTE - NSCHARTNOTEFT_GEN_A_CORE
Admitting Diagnosis:   Patient is a 44y old  Male who presents with a chief complaint of brain stem bleed (10 Sourav 2025 10:24)  PAST MEDICAL & SURGICAL HISTORY:  Acute myocardial infarction  CVA (cerebral vascular accident)    Current Nutrition Order:   Diet, NPO with Tube Feed:   Tube Feeding Modality: Gastrostomy  Teedot Renal Support 1.8  Total Volume for 24 Hours (mL): 1080  Total Number of Cans: 5  Continuous  Starting Tube Feed Rate {mL per Hour}: 30  Increase Tube Feed Rate by (mL): 10     Every 4 hours  Until Goal Tube Feed Rate (mL per Hour): 60  Tube Feed Duration (in Hours): 18  Tube Feed Start Time: 10:00  Tube Feed Stop Time: 04:00  Banatrol TF     Qty per Day:  2 (12-18-24 @ 09:33) [Active]     PO Intake: N/A... remains NPO with tube feeds as primary source of nutrition (tube feeds running at goal rate this afternoon upon RD visit)     GI Issues: fecal incontinence; last BM noted on 1/10/25;    Pain: Absence of non-verbal indicators of pain    Skin Integrity: no pressure ulcers, generalized, trace edema; bruised (ecchymosis); PEG present; Mookie: 13      01-09-25 @ 07:01  -  01-10-25 @ 07:00  --------------------------------------------------------  IN: 2030 mL / OUT: 1090 mL / NET: 940 mL    01-10-25 @ 07:01  -  01-10-25 @ 14:41  --------------------------------------------------------  IN: 440 mL / OUT: 600 mL / NET: -160 mL      Labs:     CAPILLARY BLOOD GLUCOSE    Medications:  MEDICATIONS  (STANDING):  acetylcysteine 10%  Inhalation 4 milliLiter(s) Inhalation every 6 hours  albuterol/ipratropium for Nebulization 3 milliLiter(s) Nebulizer every 6 hours  amLODIPine   Tablet 5 milliGRAM(s) Oral daily  artificial tears (preservative free) Ophthalmic Solution 1 Drop(s) Right EYE every 4 hours  baclofen 5 milliGRAM(s) Oral every 12 hours  chlorhexidine 0.12% Liquid 15 milliLiter(s) Oral Mucosa every 12 hours  chlorhexidine 2% Cloths 1 Application(s) Topical <User Schedule>  doxazosin 8 milliGRAM(s) Oral at bedtime  erythromycin   Ointment 1 Application(s) Right EYE at bedtime  fluticasone propionate 50 MICROgram(s)/spray Nasal Spray 1 Spray(s) Both Nostrils two times a day  heparin   Injectable 5000 Unit(s) SubCutaneous every 8 hours  influenza   Vaccine 0.5 milliLiter(s) IntraMuscular once  levETIRAcetam 1500 milliGRAM(s) Oral two times a day  ofloxacin 0.3% Solution 1 Drop(s) Right EYE four times a day  pantoprazole  Injectable 40 milliGRAM(s) IV Push daily  petrolatum Ophthalmic Ointment 1 Application(s) Both EYES two times a day  phenytoin   Suspension 100 milliGRAM(s) Oral every 8 hours  propranolol 10 milliGRAM(s) Oral every 8 hours  sodium chloride 0.65% Nasal 1 Spray(s) Both Nostrils two times a day    MEDICATIONS  (PRN):  acetaminophen   Oral Liquid .. 650 milliGRAM(s) Oral every 6 hours PRN Temp greater or equal to 38.5C (101.3F), Mild Pain (1 - 3)    Dosing Anthropometrics:   Height for BMI (FEET)	5 Feet  Height for BMI (INCHES)	8 Inch(s)  Height for BMI (CM)	172.72 Centimeter(s)  Weight for BMI (lbs)	176.4 lb  Weight for BMI (kg)	80 kg  Body Mass Index	              26.8  ideal body weight:               154 pounds / 70 kg   % ideal body weight             114%     Weight Change: No new wt obtained since admit, strongly recommend nursing to obtain new wt to track/ trend changes     Estimated energy needs:  Weight used for calculations	ideal body weight  Estimated Energy Needs Weight (lbs)	154 lb  Estimated Energy Needs Weight (kg)	69.8 kg  Estimated Energy Needs From (christiana/kg)	25  Estimated Energy Needs To (christiana/kg)	30  Estimated Energy Needs Calculated From (christiana/kg)	1745  Estimated Energy Needs Calculated To (christiana/kg)	2094  Weight used for calculations	ideal body weight  Estimated Protein Needs Weight (lbs)	154 lb  Estimated Protein Needs Weight (kg)	69.8 kg  Estimated Protein Needs From (g/kg)	1.2  Estimated Protein Needs To (g/kg)	1.4  Estimated Protein Needs Calculated From (g/kg)	83.76  Estimated Protein Needs Calculated To (g/kg)	97.72  Estimated Fluid Needs Weight (lbs)	154 lb  Estimated Fluid Needs Weight (kg)	69.8 kg  Estimated Fluid Needs From (ml/kg)	25  Estimated Fluid Needs To (ml/kg)	30  Estimated Fluid Needs Calculated From (ml/kg)	1745  Estimated Fluid Needs Calculated To (ml/kg)	2094  Other Calculations	Pt is >100% ideal body weight, thus ideal body weight used for all calculations. Needs adjusted for age, clinical course.     Subjective: This is a 44 year old male, unknown past medical history (reported stroke and MI by coworkers) presented to [facility] with AMS, Pt was working at [employer] when he was found down by coworkers. EMS called and pt brought to [facility] ED. Intubated, sedated, started on cardene for SBPs in 200s. CT head showed brain stem bleed. Transferred to NSICU for further management.    Patient seen at bedside on 8 lachman for nutrition follow up. Current diet order: NPO with tube feeds of Teedot Renal Support back at goal rate of 60 cc/hr x18 hrs (providing ~ 1944 kcal, 86g protein, 713 mL free water), meeting 100% estimated nutrient needs. GI within normal limits at this time as per flowsheets, RN reports bowel movement 1/10/25 but denies diarrhea at this time. Labs: BUN elevated (59), Creatinine elevated (1.35), medical team is aware, RD to monitor trends. Medications reviewed as above. RD will continue to follow, please see nutrition recommendations below.     Previous Nutrition Diagnosis: Inadequate Oral Intake related to inability to meet nutritional demands via PO as evidenced by NPO with tube feedings    Active [ x ]  Resolved [   ]    Goal: Pt will consume >/= 75% of estimated nutrient needs via tolerated route     Nutrition Recommendations:   1. Continue with current tube feed regimen to meet 100% estimated nutrient needs:  **[employer] Renal Support 1.8: at goal of 60mL/hour x 18 hours, providing 1080mL total volume, 1944 calories, 86g protein, ~713mL free water; this meets ~24 calories/kg ideal body weight, ~1.1g protein/kg ideal body weight**  **Provide Banatrol Tube Feeding prn**  **Maintain aspiration precautions at all times; monitor for signs/symptoms of intolerance**  2. Monitor weights (weekly), GI function, skin integrity; electrolytes, BMP, glucose, CBC per MD discretion *renal indices*  3. Pain and bowel regimen per medical team  4. Align nutrition with goals of care at all times  5. Recommend nursing to obtain new weights to track/trend changes    Education: deferred at this time related to pt's cognitive status.     Risk Level: High [   ] Moderate [ x ] Low [   ].

## 2025-01-11 PROCEDURE — 99233 SBSQ HOSP IP/OBS HIGH 50: CPT

## 2025-01-11 PROCEDURE — 99222 1ST HOSP IP/OBS MODERATE 55: CPT

## 2025-01-11 RX ADMIN — IPRATROPIUM BROMIDE AND ALBUTEROL SULFATE 3 MILLILITER(S): .5; 2.5 SOLUTION RESPIRATORY (INHALATION) at 17:08

## 2025-01-11 RX ADMIN — Medication 1 DROP(S): at 09:07

## 2025-01-11 RX ADMIN — IPRATROPIUM BROMIDE AND ALBUTEROL SULFATE 3 MILLILITER(S): .5; 2.5 SOLUTION RESPIRATORY (INHALATION) at 11:03

## 2025-01-11 RX ADMIN — HEPARIN SODIUM 5000 UNIT(S): 1000 INJECTION INTRAVENOUS; SUBCUTANEOUS at 05:05

## 2025-01-11 RX ADMIN — Medication 40 MILLIGRAM(S): at 11:03

## 2025-01-11 RX ADMIN — Medication 1 DROP(S): at 13:12

## 2025-01-11 RX ADMIN — ACETYLCYSTEINE 4 MILLILITER(S): 200 INHALANT RESPIRATORY (INHALATION) at 05:04

## 2025-01-11 RX ADMIN — Medication 1 DROP(S): at 01:00

## 2025-01-11 RX ADMIN — LEVETIRACETAM 1500 MILLIGRAM(S): 10 INJECTION, SOLUTION INTRAVENOUS at 17:10

## 2025-01-11 RX ADMIN — DOXAZOSIN MESYLATE 8 MILLIGRAM(S): 8 TABLET ORAL at 22:11

## 2025-01-11 RX ADMIN — OFLOXACIN 1 DROP(S): 3 SOLUTION OPHTHALMIC at 17:09

## 2025-01-11 RX ADMIN — Medication 650 MILLIGRAM(S): at 09:38

## 2025-01-11 RX ADMIN — Medication 1 SPRAY(S): at 05:05

## 2025-01-11 RX ADMIN — FLUTICASONE PROPIONATE 1 SPRAY(S): 50 SPRAY, METERED NASAL at 17:09

## 2025-01-11 RX ADMIN — FLUTICASONE PROPIONATE 1 SPRAY(S): 50 SPRAY, METERED NASAL at 05:06

## 2025-01-11 RX ADMIN — Medication 1 DROP(S): at 05:06

## 2025-01-11 RX ADMIN — LEVETIRACETAM 1500 MILLIGRAM(S): 10 INJECTION, SOLUTION INTRAVENOUS at 05:03

## 2025-01-11 RX ADMIN — Medication 1 DROP(S): at 22:24

## 2025-01-11 RX ADMIN — AMLODIPINE BESYLATE 5 MILLIGRAM(S): 10 TABLET ORAL at 05:03

## 2025-01-11 RX ADMIN — ACETYLCYSTEINE 4 MILLILITER(S): 200 INHALANT RESPIRATORY (INHALATION) at 17:10

## 2025-01-11 RX ADMIN — Medication 100 MILLIGRAM(S): at 22:18

## 2025-01-11 RX ADMIN — Medication 1 APPLICATION(S): at 06:46

## 2025-01-11 RX ADMIN — Medication 1 SPRAY(S): at 17:09

## 2025-01-11 RX ADMIN — Medication 100 MILLIGRAM(S): at 12:13

## 2025-01-11 RX ADMIN — Medication 15 MILLILITER(S): at 05:04

## 2025-01-11 RX ADMIN — BACLOFEN 5 MILLIGRAM(S): 10 INJECTION INTRATHECAL at 05:03

## 2025-01-11 RX ADMIN — Medication 1 DROP(S): at 17:09

## 2025-01-11 RX ADMIN — Medication 1 APPLICATION(S): at 05:09

## 2025-01-11 RX ADMIN — Medication 15 MILLILITER(S): at 17:10

## 2025-01-11 RX ADMIN — Medication 650 MILLIGRAM(S): at 10:10

## 2025-01-11 RX ADMIN — ERYTHROMYCIN 1 APPLICATION(S): 5 OINTMENT OPHTHALMIC at 22:24

## 2025-01-11 RX ADMIN — HEPARIN SODIUM 5000 UNIT(S): 1000 INJECTION INTRAVENOUS; SUBCUTANEOUS at 13:13

## 2025-01-11 RX ADMIN — HEPARIN SODIUM 5000 UNIT(S): 1000 INJECTION INTRAVENOUS; SUBCUTANEOUS at 22:12

## 2025-01-11 RX ADMIN — OFLOXACIN 1 DROP(S): 3 SOLUTION OPHTHALMIC at 05:07

## 2025-01-11 RX ADMIN — ACETYLCYSTEINE 4 MILLILITER(S): 200 INHALANT RESPIRATORY (INHALATION) at 11:03

## 2025-01-11 RX ADMIN — Medication 1 APPLICATION(S): at 17:10

## 2025-01-11 RX ADMIN — Medication 100 MILLIGRAM(S): at 05:04

## 2025-01-11 RX ADMIN — OFLOXACIN 1 DROP(S): 3 SOLUTION OPHTHALMIC at 11:03

## 2025-01-11 RX ADMIN — IPRATROPIUM BROMIDE AND ALBUTEROL SULFATE 3 MILLILITER(S): .5; 2.5 SOLUTION RESPIRATORY (INHALATION) at 05:05

## 2025-01-11 RX ADMIN — BACLOFEN 5 MILLIGRAM(S): 10 INJECTION INTRATHECAL at 17:10

## 2025-01-11 NOTE — PROGRESS NOTE ADULT - SUBJECTIVE AND OBJECTIVE BOX
HPI:  Unknown age male, unknown past medical history (reported stroke and MI by coworkers) presented to [facility] with AMS, Pt was working at [employer] when he was found down by coworkers. EMS called and pt brought to [facility] ED. Intubated, sedated, started on cardene for SBPs in 200s. CT head showed brain stem bleed. Transferred to NSICU for further management.  (30 Sep 2024 12:55).     HOSPITAL COURSE:   1/1: GEORGE overnight, neuro stable.  1/2: GEORGE overnight, neuro stable. FW decreased to 100q6.   1/3: GEORGE overnight, neuro stable. fosphenytoin IV changed to pheytoin PO via PEG per epilepsy recommendations  1/4: GEORGE overnight, neuro stable. FW incr. to 100q4  1/5: GEORGE overnight, neuro stable.   1/6: GEORGE overnight, neuro stable   1/7: GEORGE overnight, neuro stable. BUN/Cr increased, increased FW to 200q6. Given 1L bolus of LR over 2 hours. Vuong d/c'd, voiding, continue bladder scan q6.   1/8: GEORGE overnight, neuro stable. BUN/Cr improving.  1/9: GEORGE overnight, neuro stable.   1/10: GEORGE overnight, neuro stable.   1/11: GEORGE overnight, neuro stable, vent settings stable.     OVERNIGHT EVENTS:  Vital Signs Last 24 Hrs  T(C): 36.7 (10 Sourav 2025 21:44), Max: 37.3 (10 Sourav 2025 17:29)  T(F): 98.1 (10 Sourav 2025 21:44), Max: 99.1 (10 Sourav 2025 17:29)  HR: 70 (11 Jan 2025 00:26) (58 - 84)  BP: 120/78 (10 Sourav 2025 23:40) (101/75 - 126/82)  BP(mean): 94 (10 Sourav 2025 23:40) (83 - 100)  RR: 18 (11 Jan 2025 00:26) (14 - 18)  SpO2: 100% (11 Jan 2025 00:26) (96% - 100%)    Parameters below as of 11 Jan 2025 00:26  Patient On (Oxygen Delivery Method): ventilator    O2 Concentration (%): 40    I&O's Summary    09 Jan 2025 07:01  -  10 Sourav 2025 07:00  --------------------------------------------------------  IN: 2030 mL / OUT: 1090 mL / NET: 940 mL    10 Sourav 2025 07:01  -  11 Jan 2025 01:04  --------------------------------------------------------  IN: 1560 mL / OUT: 1160 mL / NET: 400 mL        PHYSICAL EXAM:  Constitutional: NAD, lying in bed.  HEENT: Pupils equal, round, reactive to light.   Respiratory: +Trach. No respiratory distress, symmetric chest rise  Cardiovascular: Regular rate and rhythm    Gastrointestinal: +PEG, Abdomen soft, nondistended.  Neurological:  AAOX0-1. Opens eyes. Tracks vertically  Motor: RUE squeezes hand to comman, LUE 0/5, B/l LE w/d.   Sensation: unable to assess  Extremities: Warm, well perfused.    TUBES/LINES:  [] CVC  [] A-line  [] Lumbar Drain  [] Ventriculostomy  [] Other    DIET:  [] NPO  [] Mechanical  [X] Tube feeds                                 LABS:      CAPILLARY BLOOD GLUCOSE          Drug Levels: [] N/A    CSF Analysis: [] N/A      Allergies    Allergy Status Unknown    Intolerances      MEDICATIONS:  Antibiotics:    Neuro:  acetaminophen   Oral Liquid .. 650 milliGRAM(s) Oral every 6 hours PRN  baclofen 5 milliGRAM(s) Oral every 12 hours  levETIRAcetam 1500 milliGRAM(s) Oral two times a day  phenytoin   Suspension 100 milliGRAM(s) Oral every 8 hours    Anticoagulation:  heparin   Injectable 5000 Unit(s) SubCutaneous every 8 hours    OTHER:  acetylcysteine 10%  Inhalation 4 milliLiter(s) Inhalation every 6 hours  albuterol/ipratropium for Nebulization 3 milliLiter(s) Nebulizer every 6 hours  amLODIPine   Tablet 5 milliGRAM(s) Oral daily  artificial tears (preservative free) Ophthalmic Solution 1 Drop(s) Right EYE every 4 hours  chlorhexidine 0.12% Liquid 15 milliLiter(s) Oral Mucosa every 12 hours  chlorhexidine 2% Cloths 1 Application(s) Topical <User Schedule>  doxazosin 8 milliGRAM(s) Oral at bedtime  erythromycin   Ointment 1 Application(s) Right EYE at bedtime  fluticasone propionate 50 MICROgram(s)/spray Nasal Spray 1 Spray(s) Both Nostrils two times a day  influenza   Vaccine 0.5 milliLiter(s) IntraMuscular once  ofloxacin 0.3% Solution 1 Drop(s) Right EYE four times a day  pantoprazole  Injectable 40 milliGRAM(s) IV Push daily  petrolatum Ophthalmic Ointment 1 Application(s) Both EYES two times a day  propranolol 10 milliGRAM(s) Oral every 8 hours  sodium chloride 0.65% Nasal 1 Spray(s) Both Nostrils two times a day    IVF:    CULTURES:    RADIOLOGY & ADDITIONAL TESTS:      ASSESSMENT:  44M PMH ?stroke/MI present to [facility] after collapsing at work. Decorticate posturing, vomiting, intubated for airway protection. 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 Transferred to [facility] for further management. s/p trach 10/14. s/p peg 10/21. Re-upgrade to ICU 2/2 desaturation event and suctioning requirements 11/15. Re-upgrade to NSICU 12/15 2/2 desaturation and tachycardia.        AMS    Intubate    Handoff    MEWS Score    Acute myocardial infarction    CVA (cerebral vascular accident)    Intracerebral hemorrhage of brain stem    Brainstem stroke    Brain stem stroke syndrome    Brain stem hemorrhage    Brain stem stroke syndrome    Hemorrhagic stroke    Brainstem stroke    Encephalopathy acute    Functional quadriplegia    Advanced care planning/counseling discussion    Encounter for palliative care    Pontine hemorrhage    Neurogenic dysphagia    Chronic respiratory failure    Acute kidney injury superimposed on CKD    Acute urinary retention    Hypertensive emergency    Sepsis, unspecified organism    Sepsis    Gram-negative bacteremia    Dyspnea    Percutaneous tracheal puncture    Altered mental status examination    EGD, with PEG    AMS    Room Service Assist    [email or web address]        PLAN:  Neuro:  - neuro/vitals q4h  - pain control: tylenol prn  - seizure tx: keppra 1500mg BID, phenytoin 100mg PO TID  - vEEG (10/3-4) negative, (10/17-10/19) negative, (12/17-12/18) negative, (12/22-12/25 ) +focal motor seizures not correlating w/ EEG  - epilepsy following  - CTH 9/30: enlarged pontine hemorrhage, CTH 10/3: stable, CTH 10/25: mostly resolved pontine hemorrhage, CTH 12/15: R mastoid air cell opacification; acute otitis media vs sterile effusion, CTH 12/18: stable.  - MRI brain 10/12: parenchymal hemorrhage, acute/subacute R cerebellar stroke    - stroke neuro signed off    CV:  - -160  - tachycardia: propranolol 10mg q8  - HTN: amlodipine 5mg  - echo (9/30) EF 75%, repeat 12/16: 57%    Resp:  - trach to vent, AC/VC 40/400/14/6  - CTA PE protocol 12/15 negative   - Secretions: duonebs/mucomyst/chest PT q6h   - CT Chest 12/24 for infectious w/u: b/l LL atelectasis and opacities     GI:  - TF via PEG (placed 10/21 by gen surg)  - bowel regimen held for loose stool, last BM 1/5  - baclofen 5q12 for hiccups     Renal:  - IVL, FW 200q6 for hydration   - Urinary retenion: Vuong removed 1/7, passed TOV  - CKD: trend BUN/Cr  - renal US 10/1: echogenicity c/w chronic med renal dz, repeat 10/8: inc renal echogeicity, c/f medical renal disease w increased hydro     Endo:  - A1c 5.4    Heme:  - DVT ppx: SCDs, SQH 5000u q8h   - LE dopplers negative 12/16    ID:  - last pan cx 12/22  - empiric PNA: cefepime (12/22-12/30), s/p vanc (12/22-12/23), s/p zosyn (12/15-12/20), s/p vanc (12/15-12/16)  - s/p Stenotrophomonas maltophilia PNA: s/p Bactrim (11/18-11/19) s/p Cefepime (11/16-11/18)  - 11/3, (+) sputum for stenotrophomas maltophlia, blood cx (+) klebsiella, cefepime 2gq12 (11/6 - 11/12)   - empiric tx: s/p zosyn (11/3-11/6) , s/p vanc  (11/3-11/5)  - S/p Ancef (10/4-10/14) for PNA, and s/p Unasyn (10/18-10/23) +actinobacter baumanii     MISC:  - ophtho consult for keratitis  - erythromycin ointment R eye q4hrs, ofloxacin ointment R eye QID, artificial tears R eye q2hrs, moisture chamber at bedtime    Dispo: SDU status, DNR, pending PRUCOL for benefits     D/w Dr. D'Amico

## 2025-01-11 NOTE — PROGRESS NOTE ADULT - SUBJECTIVE AND OBJECTIVE BOX
Patient was seen and examined at bedside. Case discuss with the primary team. Pt w/o any events overnight.     OBJECTIVE:  Vital Signs Last 24 Hrs  T(C): 36.3 (11 Jan 2025 09:33), Max: 37.3 (10 Sourav 2025 17:29)  T(F): 97.4 (11 Jan 2025 09:33), Max: 99.1 (10 Sourav 2025 17:29)  HR: 70 (11 Jan 2025 08:22) (66 - 74)  BP: 123/81 (11 Jan 2025 08:22) (109/66 - 124/86)  BP(mean): 98 (11 Jan 2025 08:22) (83 - 100)  RR: 14 (11 Jan 2025 08:22) (14 - 18)  SpO2: 99% (11 Jan 2025 08:22) (96% - 100%)    Parameters below as of 11 Jan 2025 08:22  Patient On (Oxygen Delivery Method): ventilator    O2 Concentration (%): 40    PHYSICAL EXAM:  General: comfortable-appearing, no WOB on trach collar  HEENT: tracheostomy clean  Lungs: no crackles, no wheezes  Heart: regular  Abdomen: soft, no visible tenderness, + BS, +PEG  Extremities: warm, no edema, not moving to command      acetaminophen   Oral Liquid .. 650 milliGRAM(s) Oral every 6 hours PRN  acetylcysteine 10%  Inhalation 4 milliLiter(s) Inhalation every 6 hours  albuterol/ipratropium for Nebulization 3 milliLiter(s) Nebulizer every 6 hours  amLODIPine   Tablet 5 milliGRAM(s) Oral daily  artificial tears (preservative free) Ophthalmic Solution 1 Drop(s) Right EYE every 4 hours  baclofen 5 milliGRAM(s) Oral every 12 hours  chlorhexidine 0.12% Liquid 15 milliLiter(s) Oral Mucosa every 12 hours  chlorhexidine 2% Cloths 1 Application(s) Topical <User Schedule>  doxazosin 8 milliGRAM(s) Oral at bedtime  erythromycin   Ointment 1 Application(s) Right EYE at bedtime  fluticasone propionate 50 MICROgram(s)/spray Nasal Spray 1 Spray(s) Both Nostrils two times a day  heparin   Injectable 5000 Unit(s) SubCutaneous every 8 hours  influenza   Vaccine 0.5 milliLiter(s) IntraMuscular once  levETIRAcetam 1500 milliGRAM(s) Oral two times a day  ofloxacin 0.3% Solution 1 Drop(s) Right EYE four times a day  pantoprazole  Injectable 40 milliGRAM(s) IV Push daily  petrolatum Ophthalmic Ointment 1 Application(s) Both EYES two times a day  phenytoin   Suspension 100 milliGRAM(s) Oral every 8 hours  propranolol 10 milliGRAM(s) Oral every 8 hours  sodium chloride 0.65% Nasal 1 Spray(s) Both Nostrils two times a day

## 2025-01-11 NOTE — PROGRESS NOTE ADULT - ASSESSMENT
44M with unclear PMH (?stroke, MI) who was found down at work, intubated for airway protection and found to have acute parenchymal hemorrhage within nabil with mass effect (+ acute/subacute right cerebellar infarct) in setting of hypertensive emergency, transferred to [facility] for further neurosurgical care. Hospital course c/b poor neurologic recovery s/p trach-PEG, AUR s/p gabriel, ANIKA on CKD c/b hyperkalemia, HAP s/p amp-sulbactam (EOT 10/23), K aerogenes bacteremia  treated with 2 weeks of Cefepime per ID , On 11/15 he became septic and was transferred to NSICU due to increased o2 requirements and needed Vent support , Trach Cultures + for Stenotrophomonas which was treated with 7 days of Bactrim per ID , has been weaned of Vent since 11/23 and is now on 10lts at 40% o2 through Trach Collar. Stepped up to the NSICU on 12/15 for hypoxia and tachycardia. PE ruled out. On abx for 5 days. Unclear etiology. Now stepped down to 8Lach.    # Pontine hemorrhage  # Encephalopathy due to Intracranial Bleed   - Acute parenchymal hemorrhage within nabil with mass effect (+ acute/subacute right cerebellar infarct) in setting of hypertensive emergency.   - MRI brain also demonstrated acute/subacute R cerebellar stroke.   - No Neurosurgical Interventions were performed   - Secondary to IPH and cerebellar stroke patient has become Trach and Peg Dependent    #Acute kidney injury  # Acute Tubular Necrosis superimposed on CKD stage III  - s/p US renal with chronic medical renal disease  - Continue on Free water 200 q6h. Monitor UOP, bladder scan per protocol. Further management based on creatinine trend.    # Seizures  - Continue Seizure precautions   - Continue  Keppra and Fosphenytoin     # Hypertension  - Continue amlodipine 5mg daily with holding parameters  - Will continue to monitor BP and titrate antihypertensive as required     # Chronic respiratory failure.   - S/p percutaneous trach by pulm on 10/14/24  - Currently on Vent Support   - Continue Chest PT    # Neurogenic dysphagia.   - Pt s/p PEG with surgery 10/21/24  - TF per nutrition  - aspiration precautions and elevation of HOB.    #Acute urinary retention.   - doxazosin 8mg  - Will continue to monitor urine oupt     # Functional quadriplegia in setting of brainstem hemorrhage  - decub precautions  - care per nursing protocol     # DVT prop:   Heparin SQ q8    Dispo: Pending PRUCOL process and filing for Medicaid for long term placement in facility. Given complexity of care, in addition to complex disposition planning, would recommend patient to continue on the current level of care.     55 minutes spent on total encounter. The necessity of the time spent during the encounter on this date of service was due to:    Review of hospital course, labs, vitals, medical records.  Bedside exam  of the patient  Discussed plan of care with primary team   Documenting the encounter.

## 2025-01-12 LAB
ADD ON TEST-SPECIMEN IN LAB: SIGNIFICANT CHANGE UP
ANION GAP SERPL CALC-SCNC: 12 MMOL/L — SIGNIFICANT CHANGE UP (ref 5–17)
APPEARANCE UR: ABNORMAL
BILIRUB UR-MCNC: NEGATIVE — SIGNIFICANT CHANGE UP
BUN SERPL-MCNC: 60 MG/DL — HIGH (ref 7–23)
CALCIUM SERPL-MCNC: 9.2 MG/DL — SIGNIFICANT CHANGE UP (ref 8.4–10.5)
CHLORIDE SERPL-SCNC: 103 MMOL/L — SIGNIFICANT CHANGE UP (ref 96–108)
CO2 SERPL-SCNC: 23 MMOL/L — SIGNIFICANT CHANGE UP (ref 22–31)
COLOR SPEC: YELLOW — SIGNIFICANT CHANGE UP
CREAT SERPL-MCNC: 1.33 MG/DL — HIGH (ref 0.5–1.3)
DIFF PNL FLD: ABNORMAL
EGFR: 67 ML/MIN/1.73M2 — SIGNIFICANT CHANGE UP
EGFR: 67 ML/MIN/1.73M2 — SIGNIFICANT CHANGE UP
GLUCOSE SERPL-MCNC: 123 MG/DL — HIGH (ref 70–99)
GLUCOSE UR QL: NEGATIVE MG/DL — SIGNIFICANT CHANGE UP
GRAM STN FLD: ABNORMAL
HCT VFR BLD CALC: 32.2 % — LOW (ref 39–50)
HGB BLD-MCNC: 10.3 G/DL — LOW (ref 13–17)
KETONES UR-MCNC: NEGATIVE MG/DL — SIGNIFICANT CHANGE UP
LEUKOCYTE ESTERASE UR-ACNC: ABNORMAL
MAGNESIUM SERPL-MCNC: 2.1 MG/DL — SIGNIFICANT CHANGE UP (ref 1.6–2.6)
MCHC RBC-ENTMCNC: 30.2 PG — SIGNIFICANT CHANGE UP (ref 27–34)
MCHC RBC-ENTMCNC: 32 G/DL — SIGNIFICANT CHANGE UP (ref 32–36)
MCV RBC AUTO: 94.4 FL — SIGNIFICANT CHANGE UP (ref 80–100)
NITRITE UR-MCNC: NEGATIVE — SIGNIFICANT CHANGE UP
NRBC # BLD: 0 /100 WBCS — SIGNIFICANT CHANGE UP (ref 0–0)
NRBC BLD-RTO: 0 /100 WBCS — SIGNIFICANT CHANGE UP (ref 0–0)
PH UR: 6 — SIGNIFICANT CHANGE UP (ref 5–8)
PHOSPHATE SERPL-MCNC: 3.5 MG/DL — SIGNIFICANT CHANGE UP (ref 2.5–4.5)
PLATELET # BLD AUTO: 242 K/UL — SIGNIFICANT CHANGE UP (ref 150–400)
POTASSIUM SERPL-MCNC: 3.9 MMOL/L — SIGNIFICANT CHANGE UP (ref 3.5–5.3)
POTASSIUM SERPL-SCNC: 3.9 MMOL/L — SIGNIFICANT CHANGE UP (ref 3.5–5.3)
PROT UR-MCNC: 100 MG/DL
RAPID RVP RESULT: SIGNIFICANT CHANGE UP
RBC # BLD: 3.41 M/UL — LOW (ref 4.2–5.8)
RBC # FLD: 13.7 % — SIGNIFICANT CHANGE UP (ref 10.3–14.5)
SARS-COV-2 RNA SPEC QL NAA+PROBE: SIGNIFICANT CHANGE UP
SODIUM SERPL-SCNC: 138 MMOL/L — SIGNIFICANT CHANGE UP (ref 135–145)
SP GR SPEC: 1.02 — SIGNIFICANT CHANGE UP (ref 1–1.03)
SPECIMEN SOURCE: SIGNIFICANT CHANGE UP
UROBILINOGEN FLD QL: 0.2 MG/DL — SIGNIFICANT CHANGE UP (ref 0.2–1)
WBC # BLD: 11.87 K/UL — HIGH (ref 3.8–10.5)
WBC # FLD AUTO: 11.87 K/UL — HIGH (ref 3.8–10.5)

## 2025-01-12 PROCEDURE — 71045 X-RAY EXAM CHEST 1 VIEW: CPT | Mod: 26

## 2025-01-12 PROCEDURE — 99232 SBSQ HOSP IP/OBS MODERATE 35: CPT

## 2025-01-12 PROCEDURE — 99233 SBSQ HOSP IP/OBS HIGH 50: CPT

## 2025-01-12 RX ORDER — PIPERACILLIN-TAZO-DEXTROSE,ISO 3.375G/5
3.38 IV SOLUTION, PIGGYBACK PREMIX FROZEN(ML) INTRAVENOUS ONCE
Refills: 0 | Status: COMPLETED | OUTPATIENT
Start: 2025-01-12 | End: 2025-01-12

## 2025-01-12 RX ORDER — PIPERACILLIN-TAZO-DEXTROSE,ISO 3.375G/5
3.38 IV SOLUTION, PIGGYBACK PREMIX FROZEN(ML) INTRAVENOUS ONCE
Refills: 0 | Status: COMPLETED | OUTPATIENT
Start: 2025-01-13 | End: 2025-01-12

## 2025-01-12 RX ORDER — PIPERACILLIN-TAZO-DEXTROSE,ISO 3.375G/5
3.38 IV SOLUTION, PIGGYBACK PREMIX FROZEN(ML) INTRAVENOUS EVERY 8 HOURS
Refills: 0 | Status: DISCONTINUED | OUTPATIENT
Start: 2025-01-13 | End: 2025-01-17

## 2025-01-12 RX ADMIN — Medication 650 MILLIGRAM(S): at 05:03

## 2025-01-12 RX ADMIN — ACETYLCYSTEINE 4 MILLILITER(S): 200 INHALANT RESPIRATORY (INHALATION) at 00:28

## 2025-01-12 RX ADMIN — Medication 650 MILLIGRAM(S): at 05:33

## 2025-01-12 RX ADMIN — Medication 1 DROP(S): at 17:13

## 2025-01-12 RX ADMIN — FLUTICASONE PROPIONATE 1 SPRAY(S): 50 SPRAY, METERED NASAL at 17:13

## 2025-01-12 RX ADMIN — Medication 1 APPLICATION(S): at 05:43

## 2025-01-12 RX ADMIN — Medication 100 MILLIGRAM(S): at 21:44

## 2025-01-12 RX ADMIN — Medication 1 DROP(S): at 13:27

## 2025-01-12 RX ADMIN — AMLODIPINE BESYLATE 5 MILLIGRAM(S): 10 TABLET ORAL at 05:35

## 2025-01-12 RX ADMIN — Medication 40 MILLIGRAM(S): at 11:22

## 2025-01-12 RX ADMIN — IPRATROPIUM BROMIDE AND ALBUTEROL SULFATE 3 MILLILITER(S): .5; 2.5 SOLUTION RESPIRATORY (INHALATION) at 17:12

## 2025-01-12 RX ADMIN — ACETYLCYSTEINE 4 MILLILITER(S): 200 INHALANT RESPIRATORY (INHALATION) at 05:35

## 2025-01-12 RX ADMIN — Medication 100 MILLIGRAM(S): at 05:35

## 2025-01-12 RX ADMIN — Medication 200 GRAM(S): at 15:43

## 2025-01-12 RX ADMIN — ERYTHROMYCIN 1 APPLICATION(S): 5 OINTMENT OPHTHALMIC at 21:41

## 2025-01-12 RX ADMIN — DOXAZOSIN MESYLATE 8 MILLIGRAM(S): 8 TABLET ORAL at 21:43

## 2025-01-12 RX ADMIN — FLUTICASONE PROPIONATE 1 SPRAY(S): 50 SPRAY, METERED NASAL at 05:43

## 2025-01-12 RX ADMIN — Medication 15 MILLILITER(S): at 05:35

## 2025-01-12 RX ADMIN — OFLOXACIN 1 DROP(S): 3 SOLUTION OPHTHALMIC at 00:28

## 2025-01-12 RX ADMIN — Medication 15 MILLILITER(S): at 17:12

## 2025-01-12 RX ADMIN — HEPARIN SODIUM 5000 UNIT(S): 1000 INJECTION INTRAVENOUS; SUBCUTANEOUS at 21:42

## 2025-01-12 RX ADMIN — Medication 1 SPRAY(S): at 17:13

## 2025-01-12 RX ADMIN — Medication 1 DROP(S): at 21:42

## 2025-01-12 RX ADMIN — ACETYLCYSTEINE 4 MILLILITER(S): 200 INHALANT RESPIRATORY (INHALATION) at 17:12

## 2025-01-12 RX ADMIN — Medication 1 DROP(S): at 05:42

## 2025-01-12 RX ADMIN — Medication 1 DROP(S): at 10:04

## 2025-01-12 RX ADMIN — BACLOFEN 5 MILLIGRAM(S): 10 INJECTION INTRATHECAL at 05:36

## 2025-01-12 RX ADMIN — OFLOXACIN 1 DROP(S): 3 SOLUTION OPHTHALMIC at 17:12

## 2025-01-12 RX ADMIN — LEVETIRACETAM 1500 MILLIGRAM(S): 10 INJECTION, SOLUTION INTRAVENOUS at 17:12

## 2025-01-12 RX ADMIN — Medication 1 DROP(S): at 02:17

## 2025-01-12 RX ADMIN — Medication 25 GRAM(S): at 23:38

## 2025-01-12 RX ADMIN — Medication 1 APPLICATION(S): at 17:26

## 2025-01-12 RX ADMIN — OFLOXACIN 1 DROP(S): 3 SOLUTION OPHTHALMIC at 05:42

## 2025-01-12 RX ADMIN — HEPARIN SODIUM 5000 UNIT(S): 1000 INJECTION INTRAVENOUS; SUBCUTANEOUS at 05:36

## 2025-01-12 RX ADMIN — Medication 25 GRAM(S): at 17:11

## 2025-01-12 RX ADMIN — IPRATROPIUM BROMIDE AND ALBUTEROL SULFATE 3 MILLILITER(S): .5; 2.5 SOLUTION RESPIRATORY (INHALATION) at 23:38

## 2025-01-12 RX ADMIN — IPRATROPIUM BROMIDE AND ALBUTEROL SULFATE 3 MILLILITER(S): .5; 2.5 SOLUTION RESPIRATORY (INHALATION) at 11:23

## 2025-01-12 RX ADMIN — IPRATROPIUM BROMIDE AND ALBUTEROL SULFATE 3 MILLILITER(S): .5; 2.5 SOLUTION RESPIRATORY (INHALATION) at 00:28

## 2025-01-12 RX ADMIN — IPRATROPIUM BROMIDE AND ALBUTEROL SULFATE 3 MILLILITER(S): .5; 2.5 SOLUTION RESPIRATORY (INHALATION) at 05:35

## 2025-01-12 RX ADMIN — Medication 1 SPRAY(S): at 05:42

## 2025-01-12 RX ADMIN — OFLOXACIN 1 DROP(S): 3 SOLUTION OPHTHALMIC at 11:24

## 2025-01-12 RX ADMIN — HEPARIN SODIUM 5000 UNIT(S): 1000 INJECTION INTRAVENOUS; SUBCUTANEOUS at 13:26

## 2025-01-12 RX ADMIN — ACETYLCYSTEINE 4 MILLILITER(S): 200 INHALANT RESPIRATORY (INHALATION) at 11:23

## 2025-01-12 RX ADMIN — OFLOXACIN 1 DROP(S): 3 SOLUTION OPHTHALMIC at 23:39

## 2025-01-12 RX ADMIN — ACETYLCYSTEINE 4 MILLILITER(S): 200 INHALANT RESPIRATORY (INHALATION) at 23:38

## 2025-01-12 RX ADMIN — Medication 100 MILLIGRAM(S): at 13:27

## 2025-01-12 RX ADMIN — LEVETIRACETAM 1500 MILLIGRAM(S): 10 INJECTION, SOLUTION INTRAVENOUS at 05:35

## 2025-01-12 RX ADMIN — BACLOFEN 5 MILLIGRAM(S): 10 INJECTION INTRATHECAL at 17:12

## 2025-01-12 NOTE — PROGRESS NOTE ADULT - ASSESSMENT
44M with unclear PMH (?stroke, MI) who was found down at work, intubated for airway protection and found to have acute parenchymal hemorrhage within nabil with mass effect (+ acute/subacute right cerebellar infarct) in setting of hypertensive emergency, transferred to [facility] for further neurosurgical care. Hospital course c/b poor neurologic recovery s/p trach-PEG, AUR s/p gabriel, ANIKA on CKD c/b hyperkalemia, HAP s/p amp-sulbactam (EOT 10/23), K aerogenes bacteremia  treated with 2 weeks of Cefepime per ID , On 11/15 he became septic and was transferred to NSICU due to increased o2 requirements and needed Vent support , Trach Cultures + for Stenotrophomonas which was treated with 7 days of Bactrim per ID , has been weaned of Vent since 11/23 and is now on 10lts at 40% o2 through Trach Collar. Stepped up to the NSICU on 12/15 for hypoxia and tachycardia. PE ruled out. On abx for 5 days. Unclear etiology. Now stepped down to 8Lach.    # Pontine hemorrhage  # Encephalopathy due to Intracranial Bleed   - Acute parenchymal hemorrhage within nabil with mass effect (+ acute/subacute right cerebellar infarct) in setting of hypertensive emergency.   - MRI brain also demonstrated acute/subacute R cerebellar stroke.   - No Neurosurgical Interventions were performed   - Secondary to IPH and cerebellar stroke patient has become Trach and Peg Dependent    #Fever   -Pt with fever w/ Tmax of 102.7; Pt with 2 of SIRS criteria ( temperature and tachycardia)   -Will f/u lactic acid and Blood cx  -Pt CXR on 1/12 showed Right basilar atelectasis.  -Pt with increased WBC 7.08->11.87   -Will start empiric abx with Zosyn after blood cx sent.       #Acute kidney injury  # Acute Tubular Necrosis superimposed on CKD stage III  - s/p US renal with chronic medical renal disease  - Continue on Free water 200 q6h. Monitor UOP, bladder scan per protocol. Further management based on creatinine trend.    # Seizures  - Continue Seizure precautions   - Continue  Keppra and Fosphenytoin     # Hypertension  - Continue amlodipine 5mg daily with holding parameters  - Will continue to monitor BP and titrate antihypertensive as required     # Chronic respiratory failure.   - S/p percutaneous trach by pulm on 10/14/24  - Currently on Vent Support   - Continue Chest PT    # Neurogenic dysphagia.   - Pt s/p PEG with surgery 10/21/24  - TF per nutrition  - aspiration precautions and elevation of HOB.    #Acute urinary retention.   - doxazosin 8mg  - Will continue to monitor urine oupt     # Functional quadriplegia in setting of brainstem hemorrhage  - decub precautions  - care per nursing protocol     # DVT prop:   Heparin SQ q8    Dispo: Pending PRUCOL process and filing for Medicaid for long term placement in facility. Given complexity of care, in addition to complex disposition planning, would recommend patient to continue on the current level of care.     55 minutes spent on total encounter. The necessity of the time spent during the encounter on this date of service was due to:    Review of hospital course, labs, vitals, medical records.  Bedside exam  of the patient  Discussed plan of care with primary team   Documenting the encounter.       44M with unclear PMH (?stroke, MI) who was found down at work, intubated for airway protection and found to have acute parenchymal hemorrhage within nabil with mass effect (+ acute/subacute right cerebellar infarct) in setting of hypertensive emergency, transferred to [facility] for further neurosurgical care. Hospital course c/b poor neurologic recovery s/p trach-PEG, AUR s/p gabriel, ANIKA on CKD c/b hyperkalemia, HAP s/p amp-sulbactam (EOT 10/23), K aerogenes bacteremia  treated with 2 weeks of Cefepime per ID , On 11/15 he became septic and was transferred to NSICU due to increased o2 requirements and needed Vent support , Trach Cultures + for Stenotrophomonas which was treated with 7 days of Bactrim per ID , has been weaned of Vent since 11/23 and is now on 10lts at 40% o2 through Trach Collar. Stepped up to the NSICU on 12/15 for hypoxia and tachycardia. PE ruled out. On abx for 5 days. Unclear etiology. Now stepped down to 8Lach.    # Pontine hemorrhage  # Encephalopathy due to Intracranial Bleed   - Acute parenchymal hemorrhage within nabil with mass effect (+ acute/subacute right cerebellar infarct) in setting of hypertensive emergency.   - MRI brain also demonstrated acute/subacute R cerebellar stroke.   - No Neurosurgical Interventions were performed   - Secondary to IPH and cerebellar stroke patient has become Trach and Peg Dependent    #Fever   -Pt with fever w/ Tmax of 102.7; Pt with 2 of SIRS criteria ( temperature and tachycardia)   -Will f/u lactic acid and Blood cx  -Pt CXR on 1/12 showed Right basilar atelectasis. Pt's COVID and RVP were negative   -Pt with increased WBC 7.08->11.87   -Will start empiric abx with Zosyn after blood cx sent.       #Acute kidney injury  # Acute Tubular Necrosis superimposed on CKD stage III  - s/p US renal with chronic medical renal disease  - Continue on Free water 200 q6h. Monitor UOP, bladder scan per protocol. Further management based on creatinine trend.    # Seizures  - Continue Seizure precautions   - Continue  Keppra and Fosphenytoin     # Hypertension  - Continue amlodipine 5mg daily with holding parameters  - Will continue to monitor BP and titrate antihypertensive as required     # Chronic respiratory failure.   - S/p percutaneous trach by pulm on 10/14/24  - Currently on Vent Support   - Continue Chest PT    # Neurogenic dysphagia.   - Pt s/p PEG with surgery 10/21/24  - TF per nutrition  - aspiration precautions and elevation of HOB.    #Acute urinary retention.   - doxazosin 8mg  - Will continue to monitor urine oupt     # Functional quadriplegia in setting of brainstem hemorrhage  - decub precautions  - care per nursing protocol     # DVT prop:   Heparin SQ q8    Dispo: Pending PRUCOL process and filing for Medicaid for long term placement in facility. Given complexity of care, in addition to complex disposition planning, would recommend patient to continue on the current level of care.     55 minutes spent on total encounter. The necessity of the time spent during the encounter on this date of service was due to:    Review of hospital course, labs, vitals, medical records.  Bedside exam  of the patient  Discussed plan of care with primary team   Documenting the encounter.       44M with unclear PMH (?stroke, MI) who was found down at work, intubated for airway protection and found to have acute parenchymal hemorrhage within nabil with mass effect (+ acute/subacute right cerebellar infarct) in setting of hypertensive emergency, transferred to [facility] for further neurosurgical care. Hospital course c/b poor neurologic recovery s/p trach-PEG, AUR s/p gabriel, ANIKA on CKD c/b hyperkalemia, HAP s/p amp-sulbactam (EOT 10/23), K aerogenes bacteremia  treated with 2 weeks of Cefepime per ID , On 11/15 he became septic and was transferred to NSICU due to increased o2 requirements and needed Vent support , Trach Cultures + for Stenotrophomonas which was treated with 7 days of Bactrim per ID , has been weaned of Vent since 11/23 and is now on 10lts at 40% o2 through Trach Collar. Stepped up to the NSICU on 12/15 for hypoxia and tachycardia. PE ruled out. On abx for 5 days. Unclear etiology. Now stepped down to 8Lach.    # Pontine hemorrhage  # Encephalopathy due to Intracranial Bleed   - Acute parenchymal hemorrhage within nabil with mass effect (+ acute/subacute right cerebellar infarct) in setting of hypertensive emergency.   - MRI brain also demonstrated acute/subacute R cerebellar stroke.   - No Neurosurgical Interventions were performed   - Secondary to IPH and cerebellar stroke patient has become Trach and Peg Dependent    #Fever   -Pt with fever w/ Tmax of 102.7; Pt with 2 of SIRS criteria ( temperature Tmax 102.6 and tachycardia -106)   -Will f/u lactic acid and Blood cx  -Pt CXR on 1/12 showed Right basilar atelectasis. Pt's COVID and RVP were negative   -Pt with increased WBC 7.08->11.87   -Will start empiric abx with Zosyn after blood cx sent.       #Acute kidney injury  # Acute Tubular Necrosis superimposed on CKD stage III  - s/p US renal with chronic medical renal disease  - Continue on Free water 200 q6h. Monitor UOP, bladder scan per protocol. Further management based on creatinine trend.    # Seizures  - Continue Seizure precautions   - Continue  Keppra and Fosphenytoin     # Hypertension  - Continue amlodipine 5mg daily with holding parameters  - Will continue to monitor BP and titrate antihypertensive as required     # Chronic respiratory failure.   - S/p percutaneous trach by pulm on 10/14/24  - Currently on Vent Support   - Continue Chest PT    # Neurogenic dysphagia.   - Pt s/p PEG with surgery 10/21/24  - TF per nutrition  - aspiration precautions and elevation of HOB.    #Acute urinary retention.   - doxazosin 8mg  - Will continue to monitor urine oupt     # Functional quadriplegia in setting of brainstem hemorrhage  - decub precautions  - care per nursing protocol     # DVT prop:   Heparin SQ q8    Dispo: Pending PRUCOL process and filing for Medicaid for long term placement in facility. Given complexity of care, in addition to complex disposition planning, would recommend patient to continue on the current level of care.     55 minutes spent on total encounter. The necessity of the time spent during the encounter on this date of service was due to:    Review of hospital course, labs, vitals, medical records.  Bedside exam  of the patient  Discussed plan of care with primary team   Documenting the encounter.       44M with unclear PMH (?stroke, MI) who was found down at work, intubated for airway protection and found to have acute parenchymal hemorrhage within nabil with mass effect (+ acute/subacute right cerebellar infarct) in setting of hypertensive emergency, transferred to [facility] for further neurosurgical care. Hospital course c/b poor neurologic recovery s/p trach-PEG, AUR s/p gabriel, ANIKA on CKD c/b hyperkalemia, HAP s/p amp-sulbactam (EOT 10/23), K aerogenes bacteremia  treated with 2 weeks of Cefepime per ID , On 11/15 he became septic and was transferred to NSICU due to increased o2 requirements and needed Vent support , Trach Cultures + for Stenotrophomonas which was treated with 7 days of Bactrim per ID , has been weaned of Vent since 11/23 and is now on 10lts at 40% o2 through Trach Collar. Stepped up to the NSICU on 12/15 for hypoxia and tachycardia. PE ruled out. On abx for 5 days. Unclear etiology. Now stepped down to 8Lach.    # Pontine hemorrhage  # Encephalopathy due to Intracranial Bleed   - Acute parenchymal hemorrhage within nabil with mass effect (+ acute/subacute right cerebellar infarct) in setting of hypertensive emergency.   - MRI brain also demonstrated acute/subacute R cerebellar stroke.   - No Neurosurgical Interventions were performed   - Secondary to IPH and cerebellar stroke patient has become Trach and Peg Dependent    #Fever   -Pt with fever w/ Tmax of 102.7; Pt with 2 of SIRS criteria ( temperature Tmax 102.6 and tachycardia -106)   -Will f/u lactic acid and Blood cx  -Pt CXR on 1/12 showed Right basilar atelectasis. Pt's COVID and RVP were negative   -Pt with increased WBC 7.08->11.87   -Will start empiric abx with Vancomycin and  Zosyn after blood cx sent.       #Acute kidney injury  # Acute Tubular Necrosis superimposed on CKD stage III  - s/p US renal with chronic medical renal disease  - Continue on Free water 200 q6h. Monitor UOP, bladder scan per protocol. Further management based on creatinine trend.    # Seizures  - Continue Seizure precautions   - Continue  Keppra and Fosphenytoin     # Hypertension  - Continue amlodipine 5mg daily with holding parameters  - Will continue to monitor BP and titrate antihypertensive as required     # Chronic respiratory failure.   - S/p percutaneous trach by pulm on 10/14/24  - Currently on Vent Support   - Continue Chest PT    # Neurogenic dysphagia.   - Pt s/p PEG with surgery 10/21/24  - TF per nutrition  - aspiration precautions and elevation of HOB.    #Acute urinary retention.   - doxazosin 8mg  - Will continue to monitor urine oupt     # Functional quadriplegia in setting of brainstem hemorrhage  - decub precautions  - care per nursing protocol     # DVT prop:   Heparin SQ q8    Dispo: Pending PRUCOL process and filing for Medicaid for long term placement in facility. Given complexity of care, in addition to complex disposition planning, would recommend patient to continue on the current level of care.     55 minutes spent on total encounter. The necessity of the time spent during the encounter on this date of service was due to:    Review of hospital course, labs, vitals, medical records.  Bedside exam  of the patient  Discussed plan of care with primary team   Documenting the encounter.

## 2025-01-12 NOTE — PROVIDER CONTACT NOTE (OTHER) - ASSESSMENT
Pt not opening eyes upon verbal stimulation, and not withdrawing from pain. VSS, see flow sheet Pt not opening eyes upon verbal stimulation, and not withdrawing from pain. VSS, see flow sheet. Upon sternal rub, patient opened eyes, right pupil +4, left pupil +5. Right pupil slight reaction, no reaction in left pupil

## 2025-01-12 NOTE — PROGRESS NOTE ADULT - SUBJECTIVE AND OBJECTIVE BOX
Patient was seen and examined at bedside. Case discuss with the primary team. Pt was febrile to 101.1 and 102.7 overnight.     OBJECTIVE:  Vital Signs Last 24 Hrs  T(C): 36.8 (12 Jan 2025 09:39), Max: 39.3 (12 Jan 2025 04:56)  T(F): 98.3 (12 Jan 2025 09:39), Max: 102.7 (12 Jan 2025 04:56)  HR: 66 (12 Jan 2025 10:11) (66 - 106)  BP: 103/68 (12 Jan 2025 10:11) (99/65 - 117/82)  BP(mean): 82 (12 Jan 2025 10:11) (78 - 95)  RR: 16 (12 Jan 2025 10:11) (14 - 17)  SpO2: 99% (12 Jan 2025 10:11) (94% - 99%)    Parameters below as of 12 Jan 2025 10:11  Patient On (Oxygen Delivery Method): ventilator    O2 Concentration (%): 40      PHYSICAL EXAM:  General: comfortable-appearing, no WOB on trach collar  HEENT: tracheostomy clean  Lungs: no crackles, no wheezes  Heart: regular  Abdomen: soft, no visible tenderness, + BS, +PEG  Extremities: warm, no edema, not moving to command  Neuro: Pt was initial not opening his eyes this morning but pt reevaluate with NSGY pt and pt was more alert with eyes open         LABS:                        10.3   11.87 )-----------( 242      ( 12 Jan 2025 06:42 )             32.2     01-12    138  |  103  |  60[H]  ----------------------------<  123[H]  3.9   |  23  |  1.33[H]    Ca    9.2      12 Jan 2025 06:42  Phos  3.5     01-12  Mg     2.1     01-12    1/6 CXR: Heart is normal in size. Right basilar atelectasis. Degenerative changes   of the bones.    Procal 0.40   Lactic acid pending   COVID: Negative   RVP; negative     acetaminophen   Oral Liquid .. 650 milliGRAM(s) Oral every 6 hours PRN  acetylcysteine 10%  Inhalation 4 milliLiter(s) Inhalation every 6 hours  albuterol/ipratropium for Nebulization 3 milliLiter(s) Nebulizer every 6 hours  amLODIPine   Tablet 5 milliGRAM(s) Oral daily  artificial tears (preservative free) Ophthalmic Solution 1 Drop(s) Right EYE every 4 hours  baclofen 5 milliGRAM(s) Oral every 12 hours  chlorhexidine 0.12% Liquid 15 milliLiter(s) Oral Mucosa every 12 hours  chlorhexidine 2% Cloths 1 Application(s) Topical <User Schedule>  doxazosin 8 milliGRAM(s) Oral at bedtime  erythromycin   Ointment 1 Application(s) Right EYE at bedtime  fluticasone propionate 50 MICROgram(s)/spray Nasal Spray 1 Spray(s) Both Nostrils two times a day  heparin   Injectable 5000 Unit(s) SubCutaneous every 8 hours  influenza   Vaccine 0.5 milliLiter(s) IntraMuscular once  levETIRAcetam 1500 milliGRAM(s) Oral two times a day  ofloxacin 0.3% Solution 1 Drop(s) Right EYE four times a day  pantoprazole  Injectable 40 milliGRAM(s) IV Push daily  petrolatum Ophthalmic Ointment 1 Application(s) Both EYES two times a day  phenytoin   Suspension 100 milliGRAM(s) Oral every 8 hours  propranolol 10 milliGRAM(s) Oral every 8 hours  sodium chloride 0.65% Nasal 1 Spray(s) Both Nostrils two times a day

## 2025-01-12 NOTE — PROGRESS NOTE ADULT - SUBJECTIVE AND OBJECTIVE BOX
HPI:  Unknown age male, unknown past medical history (reported stroke and MI by coworkers) presented to [facility] with AMS, Pt was working at [employer] when he was found down by coworkers. EMS called and pt brought to [facility] ED. Intubated, sedated, started on cardene for SBPs in 200s. CT head showed brain stem bleed. Transferred to NSICU for further management.  (30 Sep 2024 12:55)    HOSPITAL COURSE:         OVERNIGHT EVENTS:  Vital Signs Last 24 Hrs  T(C): 37.1 (11 Jan 2025 22:03), Max: 37.1 (11 Jan 2025 22:03)  T(F): 98.7 (11 Jan 2025 22:03), Max: 98.7 (11 Jan 2025 22:03)  HR: 102 (12 Jan 2025 00:16) (66 - 102)  BP: 113/75 (12 Jan 2025 00:16) (102/68 - 123/81)  BP(mean): 88 (12 Jan 2025 00:16) (79 - 98)  RR: 16 (12 Jan 2025 00:16) (14 - 18)  SpO2: 98% (12 Jan 2025 00:16) (94% - 100%)    Parameters below as of 12 Jan 2025 00:16  Patient On (Oxygen Delivery Method): ventilator    O2 Concentration (%): 40    I&O's Summary    10 Sourav 2025 07:01  -  11 Jan 2025 07:00  --------------------------------------------------------  IN: 2000 mL / OUT: 1660 mL / NET: 340 mL    11 Jan 2025 07:01  -  12 Jan 2025 00:40  --------------------------------------------------------  IN: 1420 mL / OUT: 480 mL / NET: 940 mL        PHYSICAL EXAM:  Neurological:    Motor exam:         [] Upper extremity              Bi(c5)  WE(c6)  EE(c7)   FF(c8)                                                R         5/5        5/5        5/5       5/5                                               L          5/5        5/5        5/5       5/5         [] Lower extremeity          HF(l2)   KE(l3)    TA(l4)   EHL(l5)  GS(s1)                                                 R        5/5        5/5        5/5       5/5         5/5                                               L         5/5        5/5       5/5       5/5          5/5                                                        [] warm well perfused; capillary refill <3 seconds     Sensation: [] intact to light touch  [] decreased:       Cardiovascular:  Respiratory:  Gastrointestinal:  Genitourinary:  Extremities:  Incision/Wound:    TUBES/LINES:  [] Vuong  [] Lumbar Drain  [] Wound Drains  [] Others      DIET:  [] NPO  [] Mechanical  [] Tube feeds    LABS:                  CAPILLARY BLOOD GLUCOSE          Drug Levels: [] N/A    CSF Analysis: [] N/A      Allergies    Allergy Status Unknown    Intolerances      MEDICATIONS:  Antibiotics:    Neuro:  acetaminophen   Oral Liquid .. 650 milliGRAM(s) Oral every 6 hours PRN  baclofen 5 milliGRAM(s) Oral every 12 hours  levETIRAcetam 1500 milliGRAM(s) Oral two times a day  phenytoin   Suspension 100 milliGRAM(s) Oral every 8 hours    Anticoagulation:  heparin   Injectable 5000 Unit(s) SubCutaneous every 8 hours    OTHER:  acetylcysteine 10%  Inhalation 4 milliLiter(s) Inhalation every 6 hours  albuterol/ipratropium for Nebulization 3 milliLiter(s) Nebulizer every 6 hours  amLODIPine   Tablet 5 milliGRAM(s) Oral daily  artificial tears (preservative free) Ophthalmic Solution 1 Drop(s) Right EYE every 4 hours  chlorhexidine 0.12% Liquid 15 milliLiter(s) Oral Mucosa every 12 hours  chlorhexidine 2% Cloths 1 Application(s) Topical <User Schedule>  doxazosin 8 milliGRAM(s) Oral at bedtime  erythromycin   Ointment 1 Application(s) Right EYE at bedtime  fluticasone propionate 50 MICROgram(s)/spray Nasal Spray 1 Spray(s) Both Nostrils two times a day  influenza   Vaccine 0.5 milliLiter(s) IntraMuscular once  ofloxacin 0.3% Solution 1 Drop(s) Right EYE four times a day  pantoprazole  Injectable 40 milliGRAM(s) IV Push daily  petrolatum Ophthalmic Ointment 1 Application(s) Both EYES two times a day  propranolol 10 milliGRAM(s) Oral every 8 hours  sodium chloride 0.65% Nasal 1 Spray(s) Both Nostrils two times a day    IVF:    CULTURES:    RADIOLOGY & ADDITIONAL TESTS:      ASSESSMENT:  44y Male s/p    AMS    Intubate    Handoff    MEWS Score    Acute myocardial infarction    CVA (cerebral vascular accident)    Intracerebral hemorrhage of brain stem    Brainstem stroke    Brain stem stroke syndrome    Brain stem hemorrhage    Brain stem stroke syndrome    Hemorrhagic stroke    Brainstem stroke    Encephalopathy acute    Functional quadriplegia    Advanced care planning/counseling discussion    Encounter for palliative care    Pontine hemorrhage    Neurogenic dysphagia    Chronic respiratory failure    Acute kidney injury superimposed on CKD    Acute urinary retention    Hypertensive emergency    Sepsis, unspecified organism    Sepsis    Gram-negative bacteremia    Dyspnea    Percutaneous tracheal puncture    Altered mental status examination    EGD, with PEG    AMS    Room Service Assist    SysAdmin_VisitLink        PLAN:  NEURO:    CARDIOVASCULAR:    PULMONARY:    RENAL:    GI:    HEME:    ID:    ENDO:    DVT PROPHYLAXIS:  [] Venodynes                                [] Heparin/Lovenox    DISPOSITION: HPI:  Unknown age male, unknown past medical history (reported stroke and MI by coworkers) presented to [facility] with AMS, Pt was working at [employer] when he was found down by coworkers. EMS called and pt brought to [facility] ED. Intubated, sedated, started on cardene for SBPs in 200s. CT head showed brain stem bleed. Transferred to NSICU for further management.  (30 Sep 2024 12:55)    HOSPITAL COURSE:   9/30: transferred from [facility]. A line placed. Versed dc'd. Cy Rader at bedside, states pt has family in [place], cannot confirm medications or PMH other than stroke and MI. 250cc bolus 3% given. LR switched to NS. hydralazine 25q8 started, 3% started, switched propofol to precedex   10/1: stability CTH done. Added labetalol, started TF. Palliative consulted. ethics consulted to determine surrogate. febrile 103, pan cx sent  10/2: BD 2, GEORGE overnight. TF resumed. Desatt'd to 80s, FiO2 inc. to 50. Fentanyl given, ABG, CXR ordered. Maxxed on precedex, started on propofol for DARIEN -4 - -5. Precedex dc'd. Duonebs, mucomyst, hypertonic added. 3% dc'd. Cardene dc'd. Start vanc/CTX. Increased labetalol 200q8. MRSA negative, dc'd vanc. ETT pulled back 2cm x 2, good positioning after confirmatory chest xray. Ethics attempting to establish HCP with family. Na 159, starting FW 250q6 for range 150-155.   10/3: BD3, GEORGE o/n, neuro stable. Na elevating, FW increased to 300q6. Dc'd bowel reg for diarrhea. vEEG started. SQH 5000q8 tonight.   10/4: BD 4, albumn bolus, incr. LR to 80 2/2 incr. in Cr, LR to 10 0cc/hr for uptrending Cr. Started 7% hypersal for 48hrs and SL atropine for inline/oral thick secretions. Dc'd CTX and started ancef for MSSA in the sputum. Nephrology consulted for CKD, f/u recs. SBP 170s, given hydralazine 10mg IVP.   10/5: BD5, o/n 10mg IVP hydralazine given for SBP 170s and started on hydralazine 25q8 via OGT. 10mg IV push labetalol for SBP > 160s. RT placed for diarrhea.   10/6: BD6, o/n FW increased to 350q4 per nephrology recs. IV tylenol for temp 100.6, SBp 160s presumed uncomfortable.   10/7: BD7, overnight pancultured for temp 101.8F.   10/8: BD8. GEORGE. Cr bumped. decreased LR to 75cc/hr. Adding simethicone ATC. incr hydralazine 50mgTID. Incr labetalol 300mgTID. Na 145, decreased FWF to 250q6. Start precedex. FENa consistent with intrinsic kidney injury. Pend repeat renal US. Retaining up to 1.3L, bladder scans q6, straight cath PRN  10/9: BD 9. GEORGE overnight. Neuro stable. abd xray for distention w non-specific gas pattern, OGT to LIWS for morning. duonebs/mucomyst to q8 for improving secretions. Changed tube feeds to Jevity 1.5 20cc/hr, low rate due to abdominal distention, nepro dense and more difficult to digest. Tolerating CPAP, confirmed by ABG.   10/10: BD 10. GEORGE overnight. Neuro stable. (+) gabriel for urinary retention on bladder scan. inc TF to goal rate of 40cc/hr. family leaning toward pursuing trach/PEG. 1/2 amp for FS 81.   10/11: BD 11. GEORGE overnight. Neuro stable. Trach/PEG consults placed.   10/12: BD 12. GEORGE overnight. Neuro stable. MRI brain complete.   10/13: BD 13. Increase flomax. Hold SQH after PM dose for trach tm. IVL.   10/14: BD 14. GEORGE overnight, remains on AC/VC. Gabriel placed for urinary retention. Dc'd free water.  S/p trach with pulm. NGT placed and CXR confirmed in good position.   10/15: BD 15, GEORGE ovn. resumed feeds. spiked 101, pan cx sent.   10/16: BD 16. GEORGE ovn. Lokelma 5mg for K+ 5.4. Started vanc q 24/zosyn for empiric PNA coverage, IVF to 100/hr. PEG held for fever.   10/17: BD 17,  ordered serum osm and urine osm for am. Started sinemet for neurostimulation. Increased cardura to 0.8. Started FW 100q4, dc'd IVF. MRSA negative, dc'd vanc. NGT replaced d/t coiling.   10/18: BD 18, GEORGE overnight, neuro stable. Amantadine added for neurostim. zosyn changed to unasyn for acinetobacter baumannii, failed TOV and required SC  10/19: BD 19, GEORGE ovn. cardura 2mg added for retention. labetalol decreased 200q8, hydralazine decreased 25q8. Gabriel replaced.   10/20: BD20, GEORGE overnight. NGT dislodged, replaced. PEG tomorrow w/ gen surg, FW increased to 150q4 and labetalol decreased to 100q8, lokelma given for hyperkalemia.   10/21: BD 21. POD0 PEG placement with Gen surg. decr labetolol to 50q8, incr. cardura to 0.4, started lokelma and phoslo, dc gabriel POD0 PEG placement with Gen surg.  10/22: BD 22. Plan to start TF today via PEG. dc labetalol, Following ophtho recs. Increased apnea settings - found to be in cheyne-moe respiration. CPAP 5/5.  10/23: BD 23. hydralazine d/c'd, trach collar trial today. Rectal tube placed at 6am.  10/24: BD24, o/n lokelma held due to diarrhea. Free water 100q6 resumed. dc'd tamsulosin, amantadine. Incr'd cardura to 8mg qhs. Dc'd FW. Switched jevity to nepro. gabriel placed for high urine output. Started SL atropine for oral secretions. Dc'd free water.  10/25: BD25, o/n decreased suctioning requirements to > q4hrs, GEORGE. Cr improving, cont phoslo, lokelma held at this time. Gabriel placed yest, cont. Tolerating trach collar. Given 500cc plasmalyte bolus for ANIKA. Dc'd sinemet.   10/26: BD26, o/n resumed lokelma 5mg daily and resumed 100cc free water q6hrs. Change in neuro status with new right pupillary dilation with anisocoria (right pupil 6mm fixed and left pupil 3mm briskly reactive). Given 23.4% NaCl bullet, taken for emergent CTH showing mostly resolved pontine hemorrhage, continued brainstem hypodensity likely edema d/t hemorrhage, no new hemorrhage or infarct, no herniation, mild increase in size of left lateral ventricle. Vitals remaine stable. Na goal > 140.   10/27: BD27, o/n GEORGE.Neuro stable. Pend stepdown with airway bed.   10/28: BD 28. GEORGE overnight. Neuro stable. Miralax ordered. Gabriel removed, pending TOV.  10/29: BD 29. GEORGE o/n. Given 2L NS over 8 hrs for increased BUN/Cr ratio. Gabriel placed for frequent straight cath.   10/30: BD 30.   10/31: BD 31. GEORGE overnight. Na 149, increased free water to 200q6. 1L NS for uptrending BUN.   11/1: BD 32. GEORGE overnight. Given 1L NS for dehydration. Na 146, increased FW to 250q6.   11/2: BD 33 GEORGE overnight, neuro stable, given 500cc bolus for net negative status and tachycardia   11/3: BD 34, GEORGE overnight, neuro stable. Patient remains tachycardic, EKG showing sinus tachycardia, given additional 500cc NS bolus. Febrile to 101.9F, pan cultured (without UA), CXR WNL, given tylenol.   11/4: BD 35, GEORGE overnight, neuro stable. Given 1L NS for tachycardia. sputum (+) for stenotropohomas maltophilia.   11/5: BD 36 GEORGE overnight, neuro stable. Vancomycin dc'd. Chest PT BID. ID consulted, cont zosyn.  11/6: BD 37. blood cx + klebsiella dc zosyn changed to cefepime, CTAP ordered, rpt blood cx sent.    11/7: BD 38. Pending CT A/P, given 250cc bolus and starting maintenance fluids overnight. Pending CT A/P after bolus   11/8: BD 39. CT CAP negative for infection.   11/9: BD 40. GEORGE overnight.  11/10: BD 41. GEORGE overnight. desat to 85 on trach collar, O2 inc to 10L and 100%, O2 sat inc to 95. pt tachy to 110s, euvolemic. given tylenol. ABG and CXR ordered. spiked fever, pancultured, RVP negative. AM ABG w pO2 79, rpt w pO2 79. pt appears comfortable, satting 94%.   11/11: BD 42. GEORGE overnight. pt became tachy to 130s, desat to 90 on 100% FiO2 and 10L. suctioned, (+) productive cough. temp 101.4, given 1g IV tylenol and 500cc NS bolus for euvolemia. fever and HR downtrending. LE dopplers negative for dvt  11/12: BD 43, GEORGE ovn, fever and HR downtrending, satting 97% 70% FIO2  11/13: BD 44, GEORGE ovn. started standing tylenol x24 hours for tachycardia. desat to 80s, o2 increased. CXR stable, pending CTA PE protocol.   11/14: BD 45, GEORGE overnight, neuro stable. resp therapy dec FiO2 to 70%.   11/15: BD 46, GEORGE overnight, neuro stable.  Rapid called for desaturation 30s, tachycardic 140s. Patient bagged, 100% fio2, heavily suctioned. CXR/POCUS unremarkable. ABG c/w desaturation. WBC 14.71. Afebrile. O2 improved to 90s and patient upgraded to ICU. ABG paO2 30s improved to 89 on vent. IV Tylenol x 1, sputum sent. Start protonix while o-n vent.   11/16: BD 47. POCUS showed collapsable IVCF, given 1L bolus. Vanco/Cefpime added empriic for PNA, NGT feeds restarted. MRSA swab neg, Vanc DC'd.   11/17: BD 48. GEORGE overnight. 1l bolus for tachycardia. Spiked to 101, cultured. 500cc bolus for tachycardia, tachy to 148 given 25mcg fentanyl, 250cc albumin, 1.5L bolus. 5 IV lopressor with response HR to 100s. +Stenotrophomonas on sputum cx.   11/18: BD 49. GEORGE overnight. Consulted ID, cefepime switched to bactrim x7days. Started hydrochlorothiazine 12.5mg daily.  11/19: BD 50. Tachy 120s, given tylenol and 500cc NS. Tolerating 5/5, switched to TCx. Holding phos binder. D/c Bactrim. D/c gabriel, f/u TOV. Dc'd PPI.   11/20: BD 51. GEORGE ovn. 1600 satting low 90s, mildly tachy to 110s, afebrile, RR wnl. O2 improved to mid 90s while inc O2 to 100% on TCx. CPAP 5/5 placed back on.  11/21: BD 52, GEORGE ovn, tolerating CPAP 5/5. Switch to trach collar during the day if tolerating well. HCTZ held for Cr bump, straight cath frequence increased to q4  11/22: BD 53, GEORGE ovn. Resumed phoslo. Gabriel placed. Resumed HCTZ.   11/23: BD 54. Holding tylenol in setting of possible fever, will require pan cx if febrile. Cr improved today. Cont CPAP. Bowel regimen held i/s/o diarrhea. FOBT negative.  11/24: BD 55. GEORGE overnight. Neuro stable. HCTZ dc'ed, started lisinopril 5. Lokelma dc'ed for K 3.7.   11/25: BD 56, GEORGE overnight. Neuro stable. dc'd lisinopril 5mg. Gabriel dc'd. TOV. 1545 noted to be hypotensive, MAP 50, in supine position on chair, HR 60s, afebrile, O2 96%. Given 1L cc NS bolus, placed back on bed in reverse trendelenberg, improved to map of 66. Neostick at bedside. Vitals check q1h. Dc'ed amlodipine. Failed TOV, bladder scan q6, sc prn. Added back Senna.   11/26: BD 57, GEORGE overnight. Neuro stable. Dc'd phoslo.   11/27: BD 58, GEORGE overnight. Neuro stable.    11/28: BD 59. Gabriel replaced.   11/29: GEORGE.  11/30: GEORGE, neuro stable.   12/1: BD 62, GEORGE overnight  12/2: BD 63, GEORGE overnight.; Given 1L bolus of LR for uptrending BUN/Cr.  12/3: BD 64. Reinstated eye gtt/moisture chamber given increased Rt eye injection  12/4: BD 65. GEORGE overnight. Attempted to speak with ophtho regarding eyelid weight/closure but no answer, full mailbox.   12/5: BD 66, GEORGE overnight, bowel regimen increased and had BM.   12/6: BD 67, GEORGE overnight, neuro stable.  12/7: GEORGE overnight, neuro stable. /110s, given x1 hydralazine 10 mg IVP. Restarted home amlodipine 5mg.  12/8: GEORGE. OOB to chair.     12/11: GEORGE, mucomyst added for thick secretions, simethicone for abd distension, abd xray with stool burden, increased bowel regimen.   12/12: GEORGE overnight.   12/13: GEORGE overnight.   12/14: GEORGE overnight  12/15: o/n Patient became tachycardic HR 120s and 10 minutes later O2 sat dropped as low as 89%. Patient suctioned without improvement in O2 sat and tube feeds found in suction catheter. TFs held.  STAT CXR ordered. STAT labs sent. Respiratory therapist called to bedside and patient trach connected to ventilator. After connection to ventilator and further suctioning O2 sat improved to 97% but patient HR remains 120-130s. Upgraded to NSICU for further management. Vancomycin and zosyn started. CTA  chest PE protocol and CTH ordered. Blood cultures sent.given 500cc bolus, rpt ABG sent pO2 243, CTH and CTA chest done. FS while NPO, FiO2 dec 50 pending ABG. sputum cx positive for few GPC and GNR.   12/16: GEORGE overnight, restarted amlodipine, troponin 75   12/17: GEORGE overnight, neuro stable. Attempted CPAP this morning, did not tolerate and back on full vent support. Dc'd Vanc. Tachycardia to 140s, noted extremities to be twitching along with jaw twitching. Given 2g of Keppra, total 4mg ativan and placed on EEG, full set of labs and lactate negative. Resumed trickle feeds at 20cc/hr. Given 250cc albumin for tachycardia.   12/18: GEORGE overnight. Neuro stable. CTH stable. EEG negative and dc'd.   12/19: GEORGE overnight. neuro stable. SIMV most of day, AC/VC at night.   12/20: GEORGE overnight, neuro stable. remains on VC/AC  12/21: GEORGE ovn. 1L bolus for tachy to 120s-130s.   12/22: GEORGE ovn. Tachy to 120s, given tylenol and IVF. 2mg IV ativan given for L jaw twitching. Sx resolved for 3 minutes and started again. Epilepsy contacted. Given 2mg IV ativan. Continued twitching. Increased keppra to 1500mg BID, 2mg ativan given. Propranolol started for refractory tachycardia. vEEG ordered. albumin given. POCUS performed, no b lines. Started on fosphenytoin, loaded w 20mg/kg then 100mg TID. febrile, pancx, started cefepime 2g q8, vancomycin  12/23: GEORGE ovn. +focal motor seizures on eeg. ID consulted. Vancomycin dc'ed as per ID.  12/24: GEORGE ovn, neuro stable. Baclofen 5 mg q12 started for hiccups. Na 129 from 134. Urine lytes c/w SIADH. Given 250cc 3% bolus. CT chest for infection w/u - f/u read. Repeat Na 136. UA negative  12/25: GEORGE ovn.   12/26: GEORGE ovn  12/27: GEORGE overnight. Blood cx neg @ 4 days, DC cefepime per medicine (sputum colonized).   12/28: Desaturation o/n to 80's, CXR obtained, pulse ox changed and sats resolved. Na 133 from 138, 250cc 3% bolus given. Cefepime resumed until 12/30 per ID. Rpt Na 138.  12/29: GEORGE overnight. Na stable.   12/30: GEORGE overnight. Family meeting with son, pt now DNR.   12/31: GEORGE overnight.   1/1: GEORGE overnight, neuro stable.  1/2: GEORGE overnight, neuro stable. FW decreased to 100q6.   1/3: GEORGE overnight, neuro stable. fosphenytoin IV changed to pheytoin PO via PEG per epilepsy recommendations  1/4: GEORGE overnight, neuro stable. FW incr. to 100q4  1/5: GEORGE overnight, neuro stable.   1/6: GEORGE overnight, neuro stable   1/7: GEORGE overnight, neuro stable. BUN/Cr increased, increased FW to 200q6. Given 1L bolus of LR over 2 hours. Gabriel d/c'd, voiding, continue bladder scan q6.   1/8: GEORGE overnight, neuro stable. BUN/Cr improving.  1/9: GEORGE overnight, neuro stable.   1/10: GEORGE overnight, neuro stable.   1/11: GEORGE overnight, neuro stable, vent settings stable.   1/12: GEORGE overnight, neuro stable. Febrile to 102F, f/u pan cx, chest xray, given tylenol. Zosyn started.       OVERNIGHT EVENTS:  Vital Signs Last 24 Hrs  T(C): 37.1 (11 Jan 2025 22:03), Max: 37.1 (11 Jan 2025 22:03)  T(F): 98.7 (11 Jan 2025 22:03), Max: 98.7 (11 Jan 2025 22:03)  HR: 102 (12 Jan 2025 00:16) (66 - 102)  BP: 113/75 (12 Jan 2025 00:16) (102/68 - 123/81)  BP(mean): 88 (12 Jan 2025 00:16) (79 - 98)  RR: 16 (12 Jan 2025 00:16) (14 - 18)  SpO2: 98% (12 Jan 2025 00:16) (94% - 100%)    Parameters below as of 12 Jan 2025 00:16  Patient On (Oxygen Delivery Method): ventilator    O2 Concentration (%): 40    I&O's Summary    10 Sourav 2025 07:01  -  11 Jan 2025 07:00  --------------------------------------------------------  IN: 2000 mL / OUT: 1660 mL / NET: 340 mL    11 Jan 2025 07:01  -  12 Jan 2025 00:40  --------------------------------------------------------  IN: 1420 mL / OUT: 480 mL / NET: 940 mL        PHYSICAL EXAM:  Constitutional: NAD, lying in bed.  HEENT: Pupils equal, round, reactive to light.   Respiratory: +Trach to mechanical vent. No respiratory distress, symmetric chest rise  Cardiovascular: Regular rate and rhythm    Gastrointestinal: +PEG, Abdomen soft, nondistended.  Neurological:  AAOX0-1. Opens eyes. Tracks vertically  Motor: RUE squeezes hand to comman, LUE 0/5, B/l LE w/d.   Sensation: unable to assess  Extremities: Warm, well perfused.    TUBES/LINES:  [] CVC  [] A-line  [] Lumbar Drain  [] Ventriculostomy  [] Other    DIET:  [] NPO  [] Mechanical  [X] Tube feeds    LABS:        CAPILLARY BLOOD GLUCOSE          Drug Levels: [] N/A    CSF Analysis: [] N/A      Allergies    Allergy Status Unknown    Intolerances      MEDICATIONS:  Antibiotics:    Neuro:  acetaminophen   Oral Liquid .. 650 milliGRAM(s) Oral every 6 hours PRN  baclofen 5 milliGRAM(s) Oral every 12 hours  levETIRAcetam 1500 milliGRAM(s) Oral two times a day  phenytoin   Suspension 100 milliGRAM(s) Oral every 8 hours    Anticoagulation:  heparin   Injectable 5000 Unit(s) SubCutaneous every 8 hours    OTHER:  acetylcysteine 10%  Inhalation 4 milliLiter(s) Inhalation every 6 hours  albuterol/ipratropium for Nebulization 3 milliLiter(s) Nebulizer every 6 hours  amLODIPine   Tablet 5 milliGRAM(s) Oral daily  artificial tears (preservative free) Ophthalmic Solution 1 Drop(s) Right EYE every 4 hours  chlorhexidine 0.12% Liquid 15 milliLiter(s) Oral Mucosa every 12 hours  chlorhexidine 2% Cloths 1 Application(s) Topical <User Schedule>  doxazosin 8 milliGRAM(s) Oral at bedtime  erythromycin   Ointment 1 Application(s) Right EYE at bedtime  fluticasone propionate 50 MICROgram(s)/spray Nasal Spray 1 Spray(s) Both Nostrils two times a day  influenza   Vaccine 0.5 milliLiter(s) IntraMuscular once  ofloxacin 0.3% Solution 1 Drop(s) Right EYE four times a day  pantoprazole  Injectable 40 milliGRAM(s) IV Push daily  petrolatum Ophthalmic Ointment 1 Application(s) Both EYES two times a day  propranolol 10 milliGRAM(s) Oral every 8 hours  sodium chloride 0.65% Nasal 1 Spray(s) Both Nostrils two times a day    IVF:    CULTURES:    RADIOLOGY & ADDITIONAL TESTS:      ASSESSMENT:  44M PMH ?stroke/MI present to [facility] after collapsing at work. Decorticate posturing, vomiting, intubated for airway protection. 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 Transferred to [facility] for [facility] management. s/p trach 10/14. s/p peg 10/21. Re-upgrade to ICU 2/2 desaturation event and suctioning requirements 11/15. Re-upgrade to NSICU 12/15 2/2 desaturation and tachycardia.    AMS    Intubate    Handoff    MEWS Score    Acute myocardial infarction    CVA (cerebral vascular accident)    Intracerebral hemorrhage of brain stem    Brainstem stroke    Brain stem stroke syndrome    Brain stem hemorrhage    Brain stem stroke syndrome    Hemorrhagic stroke    Brainstem stroke    Encephalopathy acute    Functional quadriplegia    Advanced care planning/counseling discussion    Encounter for palliative care    Pontine hemorrhage    Neurogenic dysphagia    Chronic respiratory failure    Acute kidney injury superimposed on CKD    Acute urinary retention    Hypertensive emergency    Sepsis, unspecified organism    Sepsis    Gram-negative bacteremia    Dyspnea    Percutaneous tracheal puncture    Altered mental status examination    EGD, with PEG    AMS    Room Service Assist    [email or web address]        PLAN:  Neuro:  - neuro/vitals q4h  - pain control: tylenol prn  - seizure tx: keppra 1500mg BID, phenytoin 100mg PO TID  - vEEG (10/3-4) negative, (10/17-10/19) negative, (12/17-12/18) negative, (12/22-12/25 ) +focal motor seizures not correlating w/ EEG  - epilepsy following  - CTH 9/30: enlarged pontine hemorrhage, CTH 10/3: stable, CTH 10/25: mostly resolved pontine hemorrhage, CTH 12/15: R mastoid air cell opacification; acute otitis media vs sterile effusion, CTH 12/18: stable.  - MRI brain 10/12: parenchymal hemorrhage, acute/subacute R cerebellar stroke    - stroke neuro signed off    CV:  - -160  - tachycardia: propranolol 10mg q8  - HTN: amlodipine 5mg  - echo (9/30) EF 75%, repeat 12/16: 57%    Resp:  - trach to vent, AC/VC 40/400/14/6  - CTA PE protocol 12/15 negative   - Secretions: duonebs/mucomyst/chest PT q6h   - CT Chest 12/24 for infectious w/u: b/l LL atelectasis and opacities     GI:  - TF via PEG (placed 10/21 by gen surg)  - bowel regimen held for loose stool, last BM 1/5  - baclofen 5q12 for hiccups     Renal:  - IVL, FW 200q6 for hydration   - Urinary retenion: Gabriel removed 1/7, passed TOV  - CKD: trend BUN/Cr  - renal US 10/1: echogenicity c/w chronic med renal dz, repeat 10/8: inc renal echogeicity, c/f medical renal disease w increased hydro     Endo:  - A1c 5.4    Heme:  - DVT ppx: SCDs, SQH 5000u q8h   - LE dopplers negative 12/16    ID:  - last pan cx 1/12  - empiric PNA: zosyn (1/12- ), cefepime (12/22-12/30), s/p vanc (12/22-12/23), s/p zosyn (12/15-12/20), s/p vanc (12/15-12/16)  - s/p Stenotrophomonas maltophilia PNA: s/p Bactrim (11/18-11/19) s/p Cefepime (11/16-11/18)  - 11/3, (+) sputum for stenotrophomas maltophlia, blood cx (+) klebsiella, cefepime 2gq12 (11/6 - 11/12)   - empiric tx: s/p zosyn (11/3-11/6) , s/p vanc  (11/3-11/5)  - S/p Ancef (10/4-10/14) for PNA, and s/p Unasyn (10/18-10/23) +actinobacter baumanii     MISC:  - ophtho consult for keratitis  - erythromycin ointment R eye q4hrs, ofloxacin ointment R eye QID, artificial tears R eye q2hrs, moisture chamber at bedtime    Dispo: SDU status, DNR, pending PRUCOL for benefits     D/w Dr. D'Amico

## 2025-01-13 LAB
ANION GAP SERPL CALC-SCNC: 16 MMOL/L — SIGNIFICANT CHANGE UP (ref 5–17)
BUN SERPL-MCNC: 62 MG/DL — HIGH (ref 7–23)
CALCIUM SERPL-MCNC: 9.1 MG/DL — SIGNIFICANT CHANGE UP (ref 8.4–10.5)
CHLORIDE SERPL-SCNC: 100 MMOL/L — SIGNIFICANT CHANGE UP (ref 96–108)
CO2 SERPL-SCNC: 22 MMOL/L — SIGNIFICANT CHANGE UP (ref 22–31)
CREAT SERPL-MCNC: 1.46 MG/DL — HIGH (ref 0.5–1.3)
EGFR: 60 ML/MIN/1.73M2 — SIGNIFICANT CHANGE UP
EGFR: 60 ML/MIN/1.73M2 — SIGNIFICANT CHANGE UP
GLUCOSE SERPL-MCNC: 120 MG/DL — HIGH (ref 70–99)
HCT VFR BLD CALC: 31.6 % — LOW (ref 39–50)
HGB BLD-MCNC: 10 G/DL — LOW (ref 13–17)
LACTATE SERPL-SCNC: 1 MMOL/L — SIGNIFICANT CHANGE UP (ref 0.5–2)
MAGNESIUM SERPL-MCNC: 2.3 MG/DL — SIGNIFICANT CHANGE UP (ref 1.6–2.6)
MCHC RBC-ENTMCNC: 30.8 PG — SIGNIFICANT CHANGE UP (ref 27–34)
MCHC RBC-ENTMCNC: 31.6 G/DL — LOW (ref 32–36)
MCV RBC AUTO: 97.2 FL — SIGNIFICANT CHANGE UP (ref 80–100)
NRBC # BLD: 0 /100 WBCS — SIGNIFICANT CHANGE UP (ref 0–0)
NRBC BLD-RTO: 0 /100 WBCS — SIGNIFICANT CHANGE UP (ref 0–0)
PHOSPHATE SERPL-MCNC: 4.5 MG/DL — SIGNIFICANT CHANGE UP (ref 2.5–4.5)
PLATELET # BLD AUTO: 215 K/UL — SIGNIFICANT CHANGE UP (ref 150–400)
POTASSIUM SERPL-MCNC: 3.8 MMOL/L — SIGNIFICANT CHANGE UP (ref 3.5–5.3)
POTASSIUM SERPL-SCNC: 3.8 MMOL/L — SIGNIFICANT CHANGE UP (ref 3.5–5.3)
RBC # BLD: 3.25 M/UL — LOW (ref 4.2–5.8)
RBC # FLD: 14.2 % — SIGNIFICANT CHANGE UP (ref 10.3–14.5)
SODIUM SERPL-SCNC: 138 MMOL/L — SIGNIFICANT CHANGE UP (ref 135–145)
WBC # BLD: 7.86 K/UL — SIGNIFICANT CHANGE UP (ref 3.8–10.5)
WBC # FLD AUTO: 7.86 K/UL — SIGNIFICANT CHANGE UP (ref 3.8–10.5)

## 2025-01-13 PROCEDURE — 99233 SBSQ HOSP IP/OBS HIGH 50: CPT

## 2025-01-13 PROCEDURE — 99232 SBSQ HOSP IP/OBS MODERATE 35: CPT

## 2025-01-13 RX ADMIN — Medication 1 DROP(S): at 10:25

## 2025-01-13 RX ADMIN — ACETYLCYSTEINE 4 MILLILITER(S): 200 INHALANT RESPIRATORY (INHALATION) at 11:54

## 2025-01-13 RX ADMIN — Medication 1 APPLICATION(S): at 05:27

## 2025-01-13 RX ADMIN — Medication 1 SPRAY(S): at 18:05

## 2025-01-13 RX ADMIN — OFLOXACIN 1 DROP(S): 3 SOLUTION OPHTHALMIC at 17:57

## 2025-01-13 RX ADMIN — OFLOXACIN 1 DROP(S): 3 SOLUTION OPHTHALMIC at 11:55

## 2025-01-13 RX ADMIN — Medication 100 MILLIGRAM(S): at 12:00

## 2025-01-13 RX ADMIN — Medication 25 GRAM(S): at 14:44

## 2025-01-13 RX ADMIN — BACLOFEN 5 MILLIGRAM(S): 10 INJECTION INTRATHECAL at 04:02

## 2025-01-13 RX ADMIN — IPRATROPIUM BROMIDE AND ALBUTEROL SULFATE 3 MILLILITER(S): .5; 2.5 SOLUTION RESPIRATORY (INHALATION) at 17:56

## 2025-01-13 RX ADMIN — FLUTICASONE PROPIONATE 1 SPRAY(S): 50 SPRAY, METERED NASAL at 05:29

## 2025-01-13 RX ADMIN — ACETYLCYSTEINE 4 MILLILITER(S): 200 INHALANT RESPIRATORY (INHALATION) at 17:58

## 2025-01-13 RX ADMIN — Medication 20 MILLIEQUIVALENT(S): at 11:52

## 2025-01-13 RX ADMIN — LEVETIRACETAM 1500 MILLIGRAM(S): 10 INJECTION, SOLUTION INTRAVENOUS at 18:02

## 2025-01-13 RX ADMIN — Medication 15 MILLILITER(S): at 17:59

## 2025-01-13 RX ADMIN — Medication 1 DROP(S): at 14:40

## 2025-01-13 RX ADMIN — OFLOXACIN 1 DROP(S): 3 SOLUTION OPHTHALMIC at 05:26

## 2025-01-13 RX ADMIN — Medication 1 DROP(S): at 21:53

## 2025-01-13 RX ADMIN — Medication 25 GRAM(S): at 21:54

## 2025-01-13 RX ADMIN — Medication 1 SPRAY(S): at 05:29

## 2025-01-13 RX ADMIN — HEPARIN SODIUM 5000 UNIT(S): 1000 INJECTION INTRAVENOUS; SUBCUTANEOUS at 05:28

## 2025-01-13 RX ADMIN — Medication 15 MILLILITER(S): at 05:26

## 2025-01-13 RX ADMIN — Medication 100 MILLIGRAM(S): at 21:53

## 2025-01-13 RX ADMIN — Medication 1 APPLICATION(S): at 05:30

## 2025-01-13 RX ADMIN — FLUTICASONE PROPIONATE 1 SPRAY(S): 50 SPRAY, METERED NASAL at 18:02

## 2025-01-13 RX ADMIN — ERYTHROMYCIN 1 APPLICATION(S): 5 OINTMENT OPHTHALMIC at 21:53

## 2025-01-13 RX ADMIN — Medication 25 GRAM(S): at 05:25

## 2025-01-13 RX ADMIN — DOXAZOSIN MESYLATE 8 MILLIGRAM(S): 8 TABLET ORAL at 21:53

## 2025-01-13 RX ADMIN — Medication 40 MILLIGRAM(S): at 11:56

## 2025-01-13 RX ADMIN — IPRATROPIUM BROMIDE AND ALBUTEROL SULFATE 3 MILLILITER(S): .5; 2.5 SOLUTION RESPIRATORY (INHALATION) at 11:53

## 2025-01-13 RX ADMIN — HEPARIN SODIUM 5000 UNIT(S): 1000 INJECTION INTRAVENOUS; SUBCUTANEOUS at 21:53

## 2025-01-13 RX ADMIN — HEPARIN SODIUM 5000 UNIT(S): 1000 INJECTION INTRAVENOUS; SUBCUTANEOUS at 14:41

## 2025-01-13 RX ADMIN — Medication 1 APPLICATION(S): at 18:55

## 2025-01-13 RX ADMIN — IPRATROPIUM BROMIDE AND ALBUTEROL SULFATE 3 MILLILITER(S): .5; 2.5 SOLUTION RESPIRATORY (INHALATION) at 05:26

## 2025-01-13 RX ADMIN — Medication 1 DROP(S): at 03:10

## 2025-01-13 RX ADMIN — Medication 1 DROP(S): at 17:58

## 2025-01-13 RX ADMIN — BACLOFEN 5 MILLIGRAM(S): 10 INJECTION INTRATHECAL at 18:03

## 2025-01-13 RX ADMIN — LEVETIRACETAM 1500 MILLIGRAM(S): 10 INJECTION, SOLUTION INTRAVENOUS at 05:28

## 2025-01-13 RX ADMIN — ACETYLCYSTEINE 4 MILLILITER(S): 200 INHALANT RESPIRATORY (INHALATION) at 05:27

## 2025-01-13 RX ADMIN — AMLODIPINE BESYLATE 5 MILLIGRAM(S): 10 TABLET ORAL at 05:28

## 2025-01-13 RX ADMIN — Medication 1 DROP(S): at 05:27

## 2025-01-13 RX ADMIN — Medication 100 MILLIGRAM(S): at 04:02

## 2025-01-13 NOTE — PROGRESS NOTE ADULT - SUBJECTIVE AND OBJECTIVE BOX
SUBJECTIVE:  Patient was seen and examined at bedside. Patient unresponsive, looking comfortable on Ventilator. No events or complaints reported.    Review of systems: unable to obtain     Diet, NPO with Tube Feed:   Tube Feeding Modality: Gastrostomy  Maribeth Sauceda Renal Support 1.8  Total Volume for 24 Hours (mL): 1080  Total Number of Cans: 5  Continuous  Starting Tube Feed Rate mL per Hour: 30  Increase Tube Feed Rate by (mL): 10     Every 4 hours  Until Goal Tube Feed Rate (mL per Hour): 60  Tube Feed Duration (in Hours): 18  Tube Feed Start Time: 10:00  Tube Feed Stop Time: 04:00  Banatrol TF     Qty per Day:  2 (12-18-24 @ 09:33) [Active]      MEDICATIONS:  MEDICATIONS  (STANDING):  acetylcysteine 10%  Inhalation 4 milliLiter(s) Inhalation every 6 hours  albuterol/ipratropium for Nebulization 3 milliLiter(s) Nebulizer every 6 hours  amLODIPine   Tablet 5 milliGRAM(s) Oral daily  artificial tears (preservative free) Ophthalmic Solution 1 Drop(s) Right EYE every 4 hours  baclofen 5 milliGRAM(s) Oral every 12 hours  chlorhexidine 0.12% Liquid 15 milliLiter(s) Oral Mucosa every 12 hours  chlorhexidine 2% Cloths 1 Application(s) Topical <User Schedule>  doxazosin 8 milliGRAM(s) Oral at bedtime  erythromycin   Ointment 1 Application(s) Right EYE at bedtime  fluticasone propionate 50 MICROgram(s)/spray Nasal Spray 1 Spray(s) Both Nostrils two times a day  heparin   Injectable 5000 Unit(s) SubCutaneous every 8 hours  influenza   Vaccine 0.5 milliLiter(s) IntraMuscular once  levETIRAcetam 1500 milliGRAM(s) Oral two times a day  ofloxacin 0.3% Solution 1 Drop(s) Right EYE four times a day  pantoprazole  Injectable 40 milliGRAM(s) IV Push daily  petrolatum Ophthalmic Ointment 1 Application(s) Both EYES two times a day  phenytoin   Suspension 100 milliGRAM(s) Oral every 8 hours  piperacillin/tazobactam IVPB.. 3.375 Gram(s) IV Intermittent every 8 hours  propranolol 10 milliGRAM(s) Oral every 8 hours  sodium chloride 0.65% Nasal 1 Spray(s) Both Nostrils two times a day    MEDICATIONS  (PRN):  acetaminophen   Oral Liquid .. 650 milliGRAM(s) Oral every 6 hours PRN Temp greater or equal to 38.5C (101.3F), Mild Pain (1 - 3)      Allergies    Allergy Status Unknown    Intolerances        OBJECTIVE:  Vital Signs Last 24 Hrs  T(C): 37.1 (13 Jan 2025 09:08), Max: 37.1 (13 Jan 2025 09:08)  T(F): 98.7 (13 Jan 2025 09:08), Max: 98.7 (13 Jan 2025 09:08)  HR: 72 (13 Jan 2025 08:15) (68 - 80)  BP: 107/71 (13 Jan 2025 08:15) (104/70 - 110/67)  BP(mean): 85 (13 Jan 2025 08:15) (81 - 89)  RR: 18 (13 Jan 2025 08:15) (14 - 18)  SpO2: 99% (13 Jan 2025 08:15) (97% - 99%)    Parameters below as of 13 Jan 2025 08:15  Patient On (Oxygen Delivery Method): ventilator    O2 Concentration (%): 40  I&O's Summary    12 Jan 2025 07:01  -  13 Jan 2025 07:00  --------------------------------------------------------  IN: 2160 mL / OUT: 1300 mL / NET: 860 mL    13 Jan 2025 07:01  -  13 Jan 2025 10:31  --------------------------------------------------------  IN: 0 mL / OUT: 5 mL / NET: -5 mL        PHYSICAL EXAM:  General: unresponsive on ventilator, NAD, no labored breathing   HEENT: trach collar in place, clean  Lungs: anterior exam, limited, no crackles, no wheezes  Heart: regular  Abdomen: soft, no distension, + BS  Extremities:  warm,     LABS:                        10.0   7.86  )-----------( 215      ( 13 Jan 2025 05:30 )             31.6     01-13    138  |  100  |  62[H]  ----------------------------<  120[H]  3.8   |  22  |  1.46[H]    Ca    9.1      13 Jan 2025 05:30  Phos  4.5     01-13  Mg     2.3     01-13          CAPILLARY BLOOD GLUCOSE        Urinalysis Basic - ( 13 Jan 2025 05:30 )    Color: x / Appearance: x / SG: x / pH: x  Gluc: 120 mg/dL / Ketone: x  / Bili: x / Urobili: x   Blood: x / Protein: x / Nitrite: x   Leuk Esterase: x / RBC: x / WBC x   Sq Epi: x / Non Sq Epi: x / Bacteria: x        MICRODATA:    Urinalysis with Rflx Culture (collected 12 Jan 2025 15:25)    Culture - Sputum (collected 12 Jan 2025 05:00)  Source: Trach Asp Tracheal Aspirate  Gram Stain (12 Jan 2025 16:07):    Numerous polymorphonuclear leukocytes per low power field    Few Squamous epithelial cells per low power field    Numerous Gram Negative Diplococci per oil power field    Moderate Gram Negative Rods per oil power field    Rare Gram Positive Rods per oil power field        RADIOLOGY/OTHER STUDIES:

## 2025-01-13 NOTE — PROGRESS NOTE ADULT - SUBJECTIVE AND OBJECTIVE BOX
HPI:  Unknown age male, unknown past medical history (reported stroke and MI by coworkers) presented to [facility] with AMS, Pt was working at [employer] when he was found down by coworkers. EMS called and pt brought to [facility] ED. Intubated, sedated, started on cardene for SBPs in 200s. CT head showed brain stem bleed. Transferred to NSICU for further management.  (30 Sep 2024 12:55)    HOSPITAL COURSE:   : transferred from [facility]. A line placed. Versed dc'd. Cy Rader at bedside, states pt has family in [place], cannot confirm medications or PMH other than stroke and MI. 250cc bolus 3% given. LR switched to NS. hydralazine 25q8 started, 3% started, switched propofol to precedex   10/1: stability CTH done. Added labetalol, started TF. Palliative consulted. ethics consulted to determine surrogate. febrile 103, pan cx sent  10/2: BD 2, GEORGE overnight. TF resumed. Desatt'd to 80s, FiO2 inc. to 50. Fentanyl given, ABG, CXR ordered. Maxxed on precedex, started on propofol for DARIEN -4 - -5. Precedex dc'd. Duonebs, mucomyst, hypertonic added. 3% dc'd. Cardene dc'd. Start vanc/CTX. Increased labetalol 200q8. MRSA negative, dc'd vanc. ETT pulled back 2cm x 2, good positioning after confirmatory chest xray. Ethics attempting to establish HCP with family. Na 159, starting FW 250q6 for range 150-155.   10/3: BD3, GEORGE o/n, neuro stable. Na elevating, FW increased to 300q6. Dc'd bowel reg for diarrhea. vEEG started. SQH 5000q8 tonight.   10/4: BD 4, albumn bolus, incr. LR to 80 2/2 incr. in Cr, LR to 10 0cc/hr for uptrending Cr. Started 7% hypersal for 48hrs and SL atropine for inline/oral thick secretions. Dc'd CTX and started ancef for MSSA in the sputum. Nephrology consulted for CKD, f/u recs. SBP 170s, given hydralazine 10mg IVP.   10/5: BD5, o/n 10mg IVP hydralazine given for SBP 170s and started on hydralazine 25q8 via OGT. 10mg IV push labetalol for SBP > 160s. RT placed for diarrhea.   10/6: BD6, o/n FW increased to 350q4 per nephrology recs. IV tylenol for temp 100.6, SBp 160s presumed uncomfortable.   10/7: BD7, overnight pancultured for temp 101.8F.   10/8: BD8. GEORGE. Cr bumped. decreased LR to 75cc/hr. Adding simethicone ATC. incr hydralazine 50mgTID. Incr labetalol 300mgTID. Na 145, decreased FWF to 250q6. Start precedex. FENa consistent with intrinsic kidney injury. Pend repeat renal US. Retaining up to 1.3L, bladder scans q6, straight cath PRN  10/9: BD 9. GEORGE overnight. Neuro stable. abd xray for distention w non-specific gas pattern, OGT to LIWS for morning. duonebs/mucomyst to q8 for improving secretions. Changed tube feeds to Jevity 1.5 20cc/hr, low rate due to abdominal distention, nepro dense and more difficult to digest. Tolerating CPAP, confirmed by ABG.   10/10: BD 10. GEORGE overnight. Neuro stable. (+) gabrile for urinary retention on bladder scan. inc TF to goal rate of 40cc/hr. family leaning toward pursuing trach/PEG. 1/2 amp for FS 81.   10/11: BD 11. GEORGE overnight. Neuro stable. Trach/PEG consults placed.   10/12: BD 12. GEORGE overnight. Neuro stable. MRI brain complete.   10/13: BD 13. Increase flomax. Hold SQH after PM dose for trach tm. IVL.   10/14: BD 14. GEORGE overnight, remains on AC/VC. Gabriel placed for urinary retention. Dc'd free water.  S/p trach with pulm. NGT placed and CXR confirmed in good position.   10/15: BD 15, GEORGE ovn. resumed feeds. spiked 101, pan cx sent.   10/16: BD 16. GEORGE ovn. Lokelma 5mg for K+ 5.4. Started vanc q 24/zosyn for empiric PNA coverage, IVF to 100/hr. PEG held for fever.   10/17: BD 17,  ordered serum osm and urine osm for am. Started sinemet for neurostimulation. Increased cardura to 0.8. Started FW 100q4, dc'd IVF. MRSA negative, dc'd vanc. NGT replaced d/t coiling.   10/18: BD 18, GEORGE overnight, neuro stable. Amantadine added for neurostim. zosyn changed to unasyn for acinetobacter baumannii, failed TOV and required SC  10/19: BD 19, GEORGE ovn. cardura 2mg added for retention. labetalol decreased 200q8, hydralazine decreased 25q8. Gabriel replaced.   10/20: BD20, GEORGE overnight. NGT dislodged, replaced. PEG tomorrow w/ gen surg, FW increased to 150q4 and labetalol decreased to 100q8, lokelma given for hyperkalemia.   10/21: BD 21. POD0 PEG placement with Gen surg. decr labetolol to 50q8, incr. cardura to 0.4, started lokelma and phoslo, dc gabriel POD0 PEG placement with Gen surg.  10/22: BD 22. Plan to start TF today via PEG. dc labetalol, Following ophtho recs. Increased apnea settings - found to be in cheyne-moe respiration. CPAP 5/5.  10/23: BD 23. hydralazine d/c'd, trach collar trial today. Rectal tube placed at 6am.  10/24: BD24, o/n lokelma held due to diarrhea. Free water 100q6 resumed. dc'd tamsulosin, amantadine. Incr'd cardura to 8mg qhs. Dc'd FW. Switched jevity to nepro. gabriel placed for high urine output. Started SL atropine for oral secretions. Dc'd free water.  10/25: BD25, o/n decreased suctioning requirements to > q4hrs, GEORGE. Cr improving, cont phoslo, lokelma held at this time. Gabriel placed yest, cont. Tolerating trach collar. Given 500cc plasmalyte bolus for ANIKA. Dc'd sinemet.   10/26: BD26, o/n resumed lokelma 5mg daily and resumed 100cc free water q6hrs. Change in neuro status with new right pupillary dilation with anisocoria (right pupil 6mm fixed and left pupil 3mm briskly reactive). Given 23.4% NaCl bullet, taken for emergent CTH showing mostly resolved pontine hemorrhage, continued brainstem hypodensity likely edema d/t hemorrhage, no new hemorrhage or infarct, no herniation, mild increase in size of left lateral ventricle. Vitals remaine stable. Na goal > 140.   10/27: BD27, o/n GEORGE.Neuro stable. Pend stepdown with airway bed.   10/28: BD 28. GEORGE overnight. Neuro stable. Miralax ordered. Gabriel removed, pending TOV.  10/29: BD 29. GEORGE o/n. Given 2L NS over 8 hrs for increased BUN/Cr ratio. Gabriel placed for frequent straight cath.   10/30: BD 30.   10/31: BD 31. GEORGE overnight. Na 149, increased free water to 200q6. 1L NS for uptrending BUN.   : BD 32. GEORGE overnight. Given 1L NS for dehydration. Na 146, increased FW to 250q6.   : BD 33 GEORGE overnight, neuro stable, given 500cc bolus for net negative status and tachycardia   11/3: BD 34, GEORGE overnight, neuro stable. Patient remains tachycardic, EKG showing sinus tachycardia, given additional 500cc NS bolus. Febrile to 101.9F, pan cultured (without UA), CXR WNL, given tylenol.   : BD 35, GEORGE overnight, neuro stable. Given 1L NS for tachycardia. sputum (+) for stenotropohomas maltophilia.   : BD 36 GEORGE overnight, neuro stable. Vancomycin dc'd. Chest PT BID. ID consulted, cont zosyn.  : BD 37. blood cx + klebsiella dc zosyn changed to cefepime, CTAP ordered, rpt blood cx sent.    : BD 38. Pending CT A/P, given 250cc bolus and starting maintenance fluids overnight. Pending CT A/P after bolus   : BD 39. CT CAP negative for infection.   : BD 40. GEORGE overnight.  11/10: BD 41. GEORGE overnight. desat to 85 on trach collar, O2 inc to 10L and 100%, O2 sat inc to 95. pt tachy to 110s, euvolemic. given tylenol. ABG and CXR ordered. spiked fever, pancultured, RVP negative. AM ABG w pO2 79, rpt w pO2 79. pt appears comfortable, satting 94%.   : BD 42. GEORGE overnight. pt became tachy to 130s, desat to 90 on 100% FiO2 and 10L. suctioned, (+) productive cough. temp 101.4, given 1g IV tylenol and 500cc NS bolus for euvolemia. fever and HR downtrending. LE dopplers negative for dvt  : BD 43, GEORGE ovn, fever and HR downtrending, satting 97% 70% FIO2  : BD 44, GEORGE ovn. started standing tylenol x24 hours for tachycardia. desat to 80s, o2 increased. CXR stable, pending CTA PE protocol.   : BD 45, GEORGE overnight, neuro stable. resp therapy dec FiO2 to 70%.   11/15: BD 46, GOERGE overnight, neuro stable.  Rapid called for desaturation 30s, tachycardic 140s. Patient bagged, 100% fio2, heavily suctioned. CXR/POCUS unremarkable. ABG c/w desaturation. WBC 14.71. Afebrile. O2 improved to 90s and patient upgraded to ICU. ABG paO2 30s improved to 89 on vent. IV Tylenol x 1, sputum sent. Start protonix while o-n vent.   : BD 47. POCUS showed collapsable IVCF, given 1L bolus. Vanco/Cefpime added empriic for PNA, NGT feeds restarted. MRSA swab neg, Vanc DC'd.   : BD 48. GEORGE overnight. 1l bolus for tachycardia. Spiked to 101, cultured. 500cc bolus for tachycardia, tachy to 148 given 25mcg fentanyl, 250cc albumin, 1.5L bolus. 5 IV lopressor with response HR to 100s. +Stenotrophomonas on sputum cx.   : BD 49. GEORGE overnight. Consulted ID, cefepime switched to bactrim x7days. Started hydrochlorothiazine 12.5mg daily.  : BD 50. Tachy 120s, given tylenol and 500cc NS. Tolerating 5/5, switched to TCx. Holding phos binder. D/c Bactrim. D/c gabriel, f/u TOV. Dc'd PPI.   : BD 51. GEORGE ovn. 1600 satting low 90s, mildly tachy to 110s, afebrile, RR wnl. O2 improved to mid 90s while inc O2 to 100% on TCx. CPAP 5/5 placed back on.  : BD 52, GEORGE ovn, tolerating CPAP 5/5. Switch to trach collar during the day if tolerating well. HCTZ held for Cr bump, straight cath frequence increased to q4  : BD 53, GEORGE ovn. Resumed phoslo. Gabriel placed. Resumed HCTZ.   : BD 54. Holding tylenol in setting of possible fever, will require pan cx if febrile. Cr improved today. Cont CPAP. Bowel regimen held i/s/o diarrhea. FOBT negative.  : BD 55. GEORGE overnight. Neuro stable. HCTZ dc'ed, started lisinopril 5. Lokelma dc'ed for K 3.7.   : BD 56, GEORGE overnight. Neuro stable. dc'd lisinopril 5mg. Gabriel dc'd. TOV. 1545 noted to be hypotensive, MAP 50, in supine position on chair, HR 60s, afebrile, O2 96%. Given 1L cc NS bolus, placed back on bed in reverse trendelenberg, improved to map of 66. Neostick at bedside. Vitals check q1h. Dc'ed amlodipine. Failed TOV, bladder scan q6, sc prn. Added back Senna.   : BD 57, GEORGE overnight. Neuro stable. Dc'd phoslo.   : BD 58, GEORGE overnight. Neuro stable.    : BD 59. Gabriel replaced.   : GEORGE.  : GEORGE, neuro stable.   : BD 62, GEORGE overnight  : BD 63, GEORGE overnight.; Given 1L bolus of LR for uptrending BUN/Cr.  12/3: BD 64. Reinstated eye gtt/moisture chamber given increased Rt eye injection  : BD 65. GEORGE overnight. Attempted to speak with ophtho regarding eyelid weight/closure but no answer, full mailbox.   : BD 66, GEORGE overnight, bowel regimen increased and had BM.   : BD 67, GEORGE overnight, neuro stable.  : GEORGE overnight, neuro stable. /110s, given x1 hydralazine 10 mg IVP. Restarted home amlodipine 5mg.  : GEORGE. OOB to chair.     : GEORGE, mucomyst added for thick secretions, simethicone for abd distension, abd xray with stool burden, increased bowel regimen.   : GEORGE overnight.   : GEORGE overnight.   : GEORGE overnight  12/15: o/n Patient became tachycardic HR 120s and 10 minutes later O2 sat dropped as low as 89%. Patient suctioned without improvement in O2 sat and tube feeds found in suction catheter. TFs held.  STAT CXR ordered. STAT labs sent. Respiratory therapist called to bedside and patient trach connected to ventilator. After connection to ventilator and further suctioning O2 sat improved to 97% but patient HR remains 120-130s. Upgraded to NSICU for further management. Vancomycin and zosyn started. CTA  chest PE protocol and CTH ordered. Blood cultures sent.given 500cc bolus, rpt ABG sent pO2 243, CTH and CTA chest done. FS while NPO, FiO2 dec 50 pending ABG. sputum cx positive for few GPC and GNR.   : GEORGE overnight, restarted amlodipine, troponin 75   : GEORGE overnight, neuro stable. Attempted CPAP this morning, did not tolerate and back on full vent support. Dc'd Vanc. Tachycardia to 140s, noted extremities to be twitching along with jaw twitching. Given 2g of Keppra, total 4mg ativan and placed on EEG, full set of labs and lactate negative. Resumed trickle feeds at 20cc/hr. Given 250cc albumin for tachycardia.   : GEORGE overnight. Neuro stable. CTH stable. EEG negative and dc'd.   : GEORGE overnight. neuro stable. SIMV most of day, AC/VC at night.   : GEORGE overnight, neuro stable. remains on VC/AC  : GEORGE ovn. 1L bolus for tachy to 120s-130s.   : GEORGE ovn. Tachy to 120s, given tylenol and IVF. 2mg IV ativan given for L jaw twitching. Sx resolved for 3 minutes and started again. Epilepsy contacted. Given 2mg IV ativan. Continued twitching. Increased keppra to 1500mg BID, 2mg ativan given. Propranolol started for refractory tachycardia. vEEG ordered. albumin given. POCUS performed, no b lines. Started on fosphenytoin, loaded w 20mg/kg then 100mg TID. febrile, pancx, started cefepime 2g q8, vancomycin  : GEORGE ovn. +focal motor seizures on eeg. ID consulted. Vancomycin dc'ed as per ID.  : GEORGE ovn, neuro stable. Baclofen 5 mg q12 started for hiccups. Na 129 from 134. Urine lytes c/w SIADH. Given 250cc 3% bolus. CT chest for infection w/u - f/u read. Repeat Na 136. UA negative  : GEORGE ovn.   : GEORGE ovn  : GEORGE overnight. Blood cx neg @ 4 days, DC cefepime per medicine (sputum colonized).   : Desaturation o/n to 80's, CXR obtained, pulse ox changed and sats resolved. Na 133 from 138, 250cc 3% bolus given. Cefepime resumed until  per ID. Rpt Na 138.  : GEORGE overnight. Na stable.   : GEORGE overnight. Family meeting with son, pt now DNR.   : GEORGE overnight.   : GEORGE overnight, neuro stable.  : GEORGE overnight, neuro stable. FW decreased to 100q6.   1/3: GEORGE overnight, neuro stable. fosphenytoin IV changed to pheytoin PO via PEG per epilepsy recommendations  : GEORGE overnight, neuro stable. FW incr. to 100q4  : GEORGE overnight, neuro stable.   : GEORGE overnight, neuro stable   : GEORGE overnight, neuro stable. BUN/Cr increased, increased FW to 200q6. Given 1L bolus of LR over 2 hours. Gabriel d/c'd, voiding, continue bladder scan q6.   : GEORGE overnight, neuro stable. BUN/Cr improving.  : GEORGE overnight, neuro stable.   1/10: GEORGE overnight, neuro stable.   : GEORGE overnight, neuro stable, vent settings stable.   : GEORGE overnight, neuro stable. Febrile to 102F, f/u pan cx, chest xray, given tylenol. Zosyn started.   : GEORGE overnight, neuro stable, cont Zosyn for presumed PNA, prelim sputum cx growing; few GS neg diplococci, mod GS neg rods, rare GS + rods, fever trend improved.       OVERNIGHT EVENTS:  Vital Signs Last 24 Hrs  T(C): 36.9 (2025 21:29), Max: 39.3 (2025 04:56)  T(F): 98.5 (2025 21:29), Max: 102.7 (2025 04:56)  HR: 80 (2025 00:07) (66 - 106)  BP: 104/70 (2025 00:07) (99/65 - 110/67)  BP(mean): 81 (2025 00:07) (78 - 85)  RR: 18 (2025 00:07) (14 - 18)  SpO2: 98% (2025 00:07) (96% - 99%)    Parameters below as of 2025 00:07  Patient On (Oxygen Delivery Method): ventilator    O2 Concentration (%): 40    I&O's Summary    2025 07:01  -  2025 07:00  --------------------------------------------------------  IN: 1600 mL / OUT: 1280 mL / NET: 320 mL    2025 07:01  -  2025 00:37  --------------------------------------------------------  IN: 1660 mL / OUT: 950 mL / NET: 710 mL        PHYSICAL EXAM:  Neurological:    Motor exam:         [] Upper extremity              Bi(c5)  WE(c6)  EE(c7)   FF(c8)                                                R         5/5        5/5        5/5       5/5                                               L          5/5        5/5        5/5       5/5         [] Lower extremeity          HF(l2)   KE(l3)    TA(l4)   EHL(l5)  GS(s1)                                                 R        5/5        5/5        5/5       5/5         5/5                                               L         5/5        5/5       5/5       5/5          5/5                                                        [] warm well perfused; capillary refill <3 seconds     Sensation: [] intact to light touch  [] decreased:       Cardiovascular:  Respiratory:  Gastrointestinal:  Genitourinary:  Extremities:  Incision/Wound:    TUBES/LINES:  [] Gabriel  [] Lumbar Drain  [] Wound Drains  [] Others      DIET:  [] NPO  [] Mechanical  [] Tube feeds    LABS:                        10.3   11.87 )-----------( 242      ( 2025 06:42 )             32.2     01-12    138  |  103  |  60[H]  ----------------------------<  123[H]  3.9   |  23  |  1.33[H]    Ca    9.2      2025 06:42  Phos  3.5     -  Mg     2.1             Urinalysis Basic - ( 2025 15:25 )    Color: Yellow / Appearance: Cloudy / S.020 / pH: x  Gluc: x / Ketone: Negative mg/dL  / Bili: Negative / Urobili: 0.2 mg/dL   Blood: x / Protein: 100 mg/dL / Nitrite: Negative   Leuk Esterase: Large / RBC: 1 /HPF /  /HPF   Sq Epi: x / Non Sq Epi: 0 /HPF / Bacteria: Many /HPF          CAPILLARY BLOOD GLUCOSE          Drug Levels: [] N/A    CSF Analysis: [] N/A      Allergies    Allergy Status Unknown    Intolerances      MEDICATIONS:  Antibiotics:  piperacillin/tazobactam IVPB.. 3.375 Gram(s) IV Intermittent every 8 hours    Neuro:  acetaminophen   Oral Liquid .. 650 milliGRAM(s) Oral every 6 hours PRN  baclofen 5 milliGRAM(s) Oral every 12 hours  levETIRAcetam 1500 milliGRAM(s) Oral two times a day  phenytoin   Suspension 100 milliGRAM(s) Oral every 8 hours    Anticoagulation:  heparin   Injectable 5000 Unit(s) SubCutaneous every 8 hours    OTHER:  acetylcysteine 10%  Inhalation 4 milliLiter(s) Inhalation every 6 hours  albuterol/ipratropium for Nebulization 3 milliLiter(s) Nebulizer every 6 hours  amLODIPine   Tablet 5 milliGRAM(s) Oral daily  artificial tears (preservative free) Ophthalmic Solution 1 Drop(s) Right EYE every 4 hours  chlorhexidine 0.12% Liquid 15 milliLiter(s) Oral Mucosa every 12 hours  chlorhexidine 2% Cloths 1 Application(s) Topical <User Schedule>  doxazosin 8 milliGRAM(s) Oral at bedtime  erythromycin   Ointment 1 Application(s) Right EYE at bedtime  fluticasone propionate 50 MICROgram(s)/spray Nasal Spray 1 Spray(s) Both Nostrils two times a day  influenza   Vaccine 0.5 milliLiter(s) IntraMuscular once  ofloxacin 0.3% Solution 1 Drop(s) Right EYE four times a day  pantoprazole  Injectable 40 milliGRAM(s) IV Push daily  petrolatum Ophthalmic Ointment 1 Application(s) Both EYES two times a day  propranolol 10 milliGRAM(s) Oral every 8 hours  sodium chloride 0.65% Nasal 1 Spray(s) Both Nostrils two times a day    IVF:    CULTURES:    RADIOLOGY & ADDITIONAL TESTS:      ASSESSMENT:  44y Male s/p    AMS    Intubate    Handoff    MEWS Score    Acute myocardial infarction    CVA (cerebral vascular accident)    Intracerebral hemorrhage of brain stem    Brainstem stroke    Brain stem stroke syndrome    Brain stem hemorrhage    Brain stem stroke syndrome    Hemorrhagic stroke    Brainstem stroke    Encephalopathy acute    Functional quadriplegia    Advanced care planning/counseling discussion    Encounter for palliative care    Pontine hemorrhage    Neurogenic dysphagia    Chronic respiratory failure    Acute kidney injury superimposed on CKD    Acute urinary retention    Hypertensive emergency    Sepsis, unspecified organism    Sepsis    Gram-negative bacteremia    Dyspnea    Percutaneous tracheal puncture    Altered mental status examination    EGD, with PEG    AMS    Room Service Assist    [email or web address]        PLAN:  NEURO:    CARDIOVASCULAR:    PULMONARY:    RENAL:    GI:    HEME:    ID:    ENDO:    DVT PROPHYLAXIS:  [] Venodynes                                [] Heparin/Lovenox    DISPOSITION: HPI:  Unknown age male, unknown past medical history (reported stroke and MI by coworkers) presented to [facility] with AMS, Pt was working at [employer] when he was found down by coworkers. EMS called and pt brought to [facility] ED. Intubated, sedated, started on cardene for SBPs in 200s. CT head showed brain stem bleed. Transferred to NSICU for further management.  (30 Sep 2024 12:55)    HOSPITAL COURSE:   : transferred from [facility]. A line placed. Versed dc'd. Cy Rader at bedside, states pt has family in [place], cannot confirm medications or PMH other than stroke and MI. 250cc bolus 3% given. LR switched to NS. hydralazine 25q8 started, 3% started, switched propofol to precedex   10/1: stability CTH done. Added labetalol, started TF. Palliative consulted. ethics consulted to determine surrogate. febrile 103, pan cx sent  10/2: BD 2, EGORGE overnight. TF resumed. Desatt'd to 80s, FiO2 inc. to 50. Fentanyl given, ABG, CXR ordered. Maxxed on precedex, started on propofol for DARIEN -4 - -5. Precedex dc'd. Duonebs, mucomyst, hypertonic added. 3% dc'd. Cardene dc'd. Start vanc/CTX. Increased labetalol 200q8. MRSA negative, dc'd vanc. ETT pulled back 2cm x 2, good positioning after confirmatory chest xray. Ethics attempting to establish HCP with family. Na 159, starting FW 250q6 for range 150-155.   10/3: BD3, GEORGE o/n, neuro stable. Na elevating, FW increased to 300q6. Dc'd bowel reg for diarrhea. vEEG started. SQH 5000q8 tonight.   10/4: BD 4, albumn bolus, incr. LR to 80 2/2 incr. in Cr, LR to 10 0cc/hr for uptrending Cr. Started 7% hypersal for 48hrs and SL atropine for inline/oral thick secretions. Dc'd CTX and started ancef for MSSA in the sputum. Nephrology consulted for CKD, f/u recs. SBP 170s, given hydralazine 10mg IVP.   10/5: BD5, o/n 10mg IVP hydralazine given for SBP 170s and started on hydralazine 25q8 via OGT. 10mg IV push labetalol for SBP > 160s. RT placed for diarrhea.   10/6: BD6, o/n FW increased to 350q4 per nephrology recs. IV tylenol for temp 100.6, SBp 160s presumed uncomfortable.   10/7: BD7, overnight pancultured for temp 101.8F.   10/8: BD8. GEORGE. Cr bumped. decreased LR to 75cc/hr. Adding simethicone ATC. incr hydralazine 50mgTID. Incr labetalol 300mgTID. Na 145, decreased FWF to 250q6. Start precedex. FENa consistent with intrinsic kidney injury. Pend repeat renal US. Retaining up to 1.3L, bladder scans q6, straight cath PRN  10/9: BD 9. GEORGE overnight. Neuro stable. abd xray for distention w non-specific gas pattern, OGT to LIWS for morning. duonebs/mucomyst to q8 for improving secretions. Changed tube feeds to Jevity 1.5 20cc/hr, low rate due to abdominal distention, nepro dense and more difficult to digest. Tolerating CPAP, confirmed by ABG.   10/10: BD 10. GEORGE overnight. Neuro stable. (+) gabriel for urinary retention on bladder scan. inc TF to goal rate of 40cc/hr. family leaning toward pursuing trach/PEG. 1/2 amp for FS 81.   10/11: BD 11. GEORGE overnight. Neuro stable. Trach/PEG consults placed.   10/12: BD 12. GEORGE overnight. Neuro stable. MRI brain complete.   10/13: BD 13. Increase flomax. Hold SQH after PM dose for trach tm. IVL.   10/14: BD 14. GEORGE overnight, remains on AC/VC. Gabriel placed for urinary retention. Dc'd free water.  S/p trach with pulm. NGT placed and CXR confirmed in good position.   10/15: BD 15, GEORGE ovn. resumed feeds. spiked 101, pan cx sent.   10/16: BD 16. GEORGE ovn. Lokelma 5mg for K+ 5.4. Started vanc q 24/zosyn for empiric PNA coverage, IVF to 100/hr. PEG held for fever.   10/17: BD 17,  ordered serum osm and urine osm for am. Started sinemet for neurostimulation. Increased cardura to 0.8. Started FW 100q4, dc'd IVF. MRSA negative, dc'd vanc. NGT replaced d/t coiling.   10/18: BD 18, GEORGE overnight, neuro stable. Amantadine added for neurostim. zosyn changed to unasyn for acinetobacter baumannii, failed TOV and required SC  10/19: BD 19, GEORGE ovn. cardura 2mg added for retention. labetalol decreased 200q8, hydralazine decreased 25q8. Gabriel replaced.   10/20: BD20, GEORGE overnight. NGT dislodged, replaced. PEG tomorrow w/ gen surg, FW increased to 150q4 and labetalol decreased to 100q8, lokelma given for hyperkalemia.   10/21: BD 21. POD0 PEG placement with Gen surg. decr labetolol to 50q8, incr. cardura to 0.4, started lokelma and phoslo, dc gabriel POD0 PEG placement with Gen surg.  10/22: BD 22. Plan to start TF today via PEG. dc labetalol, Following ophtho recs. Increased apnea settings - found to be in cheyne-moe respiration. CPAP 5/5.  10/23: BD 23. hydralazine d/c'd, trach collar trial today. Rectal tube placed at 6am.  10/24: BD24, o/n lokelma held due to diarrhea. Free water 100q6 resumed. dc'd tamsulosin, amantadine. Incr'd cardura to 8mg qhs. Dc'd FW. Switched jevity to nepro. gabriel placed for high urine output. Started SL atropine for oral secretions. Dc'd free water.  10/25: BD25, o/n decreased suctioning requirements to > q4hrs, GEORGE. Cr improving, cont phoslo, lokelma held at this time. Gabriel placed yest, cont. Tolerating trach collar. Given 500cc plasmalyte bolus for ANIKA. Dc'd sinemet.   10/26: BD26, o/n resumed lokelma 5mg daily and resumed 100cc free water q6hrs. Change in neuro status with new right pupillary dilation with anisocoria (right pupil 6mm fixed and left pupil 3mm briskly reactive). Given 23.4% NaCl bullet, taken for emergent CTH showing mostly resolved pontine hemorrhage, continued brainstem hypodensity likely edema d/t hemorrhage, no new hemorrhage or infarct, no herniation, mild increase in size of left lateral ventricle. Vitals remaine stable. Na goal > 140.   10/27: BD27, o/n GEORGE.Neuro stable. Pend stepdown with airway bed.   10/28: BD 28. GEORGE overnight. Neuro stable. Miralax ordered. Gabriel removed, pending TOV.  10/29: BD 29. GEORGE o/n. Given 2L NS over 8 hrs for increased BUN/Cr ratio. Gabriel placed for frequent straight cath.   10/30: BD 30.   10/31: BD 31. GEORGE overnight. Na 149, increased free water to 200q6. 1L NS for uptrending BUN.   : BD 32. GEORGE overnight. Given 1L NS for dehydration. Na 146, increased FW to 250q6.   : BD 33 GEORGE overnight, neuro stable, given 500cc bolus for net negative status and tachycardia   11/3: BD 34, GEORGE overnight, neuro stable. Patient remains tachycardic, EKG showing sinus tachycardia, given additional 500cc NS bolus. Febrile to 101.9F, pan cultured (without UA), CXR WNL, given tylenol.   : BD 35, GEORGE overnight, neuro stable. Given 1L NS for tachycardia. sputum (+) for stenotropohomas maltophilia.   : BD 36 GEORGE overnight, neuro stable. Vancomycin dc'd. Chest PT BID. ID consulted, cont zosyn.  : BD 37. blood cx + klebsiella dc zosyn changed to cefepime, CTAP ordered, rpt blood cx sent.    : BD 38. Pending CT A/P, given 250cc bolus and starting maintenance fluids overnight. Pending CT A/P after bolus   : BD 39. CT CAP negative for infection.   : BD 40. GEORGE overnight.  11/10: BD 41. GEORGE overnight. desat to 85 on trach collar, O2 inc to 10L and 100%, O2 sat inc to 95. pt tachy to 110s, euvolemic. given tylenol. ABG and CXR ordered. spiked fever, pancultured, RVP negative. AM ABG w pO2 79, rpt w pO2 79. pt appears comfortable, satting 94%.   : BD 42. GEORGE overnight. pt became tachy to 130s, desat to 90 on 100% FiO2 and 10L. suctioned, (+) productive cough. temp 101.4, given 1g IV tylenol and 500cc NS bolus for euvolemia. fever and HR downtrending. LE dopplers negative for dvt  : BD 43, GEORGE ovn, fever and HR downtrending, satting 97% 70% FIO2  : BD 44, GEORGE ovn. started standing tylenol x24 hours for tachycardia. desat to 80s, o2 increased. CXR stable, pending CTA PE protocol.   : BD 45, GEORGE overnight, neuro stable. resp therapy dec FiO2 to 70%.   11/15: BD 46, GEORGE overnight, neuro stable.  Rapid called for desaturation 30s, tachycardic 140s. Patient bagged, 100% fio2, heavily suctioned. CXR/POCUS unremarkable. ABG c/w desaturation. WBC 14.71. Afebrile. O2 improved to 90s and patient upgraded to ICU. ABG paO2 30s improved to 89 on vent. IV Tylenol x 1, sputum sent. Start protonix while o-n vent.   : BD 47. POCUS showed collapsable IVCF, given 1L bolus. Vanco/Cefpime added empriic for PNA, NGT feeds restarted. MRSA swab neg, Vanc DC'd.   : BD 48. GEORGE overnight. 1l bolus for tachycardia. Spiked to 101, cultured. 500cc bolus for tachycardia, tachy to 148 given 25mcg fentanyl, 250cc albumin, 1.5L bolus. 5 IV lopressor with response HR to 100s. +Stenotrophomonas on sputum cx.   : BD 49. GEORGE overnight. Consulted ID, cefepime switched to bactrim x7days. Started hydrochlorothiazine 12.5mg daily.  : BD 50. Tachy 120s, given tylenol and 500cc NS. Tolerating 5/5, switched to TCx. Holding phos binder. D/c Bactrim. D/c gabriel, f/u TOV. Dc'd PPI.   : BD 51. GEORGE ovn. 1600 satting low 90s, mildly tachy to 110s, afebrile, RR wnl. O2 improved to mid 90s while inc O2 to 100% on TCx. CPAP 5/5 placed back on.  : BD 52, GEORGE ovn, tolerating CPAP 5/5. Switch to trach collar during the day if tolerating well. HCTZ held for Cr bump, straight cath frequence increased to q4  : BD 53, GEORGE ovn. Resumed phoslo. Gabriel placed. Resumed HCTZ.   : BD 54. Holding tylenol in setting of possible fever, will require pan cx if febrile. Cr improved today. Cont CPAP. Bowel regimen held i/s/o diarrhea. FOBT negative.  : BD 55. GEORGE overnight. Neuro stable. HCTZ dc'ed, started lisinopril 5. Lokelma dc'ed for K 3.7.   : BD 56, GEORGE overnight. Neuro stable. dc'd lisinopril 5mg. Gabriel dc'd. TOV. 1545 noted to be hypotensive, MAP 50, in supine position on chair, HR 60s, afebrile, O2 96%. Given 1L cc NS bolus, placed back on bed in reverse trendelenberg, improved to map of 66. Neostick at bedside. Vitals check q1h. Dc'ed amlodipine. Failed TOV, bladder scan q6, sc prn. Added back Senna.   : BD 57, GEORGE overnight. Neuro stable. Dc'd phoslo.   : BD 58, GEORGE overnight. Neuro stable.    : BD 59. Gabriel replaced.   : GEORGE.  : GEORGE, neuro stable.   : BD 62, GEORGE overnight  : BD 63, GEORGE overnight.; Given 1L bolus of LR for uptrending BUN/Cr.  12/3: BD 64. Reinstated eye gtt/moisture chamber given increased Rt eye injection  : BD 65. GEORGE overnight. Attempted to speak with ophtho regarding eyelid weight/closure but no answer, full mailbox.   : BD 66, GEORGE overnight, bowel regimen increased and had BM.   : BD 67, GEORGE overnight, neuro stable.  : GEORGE overnight, neuro stable. /110s, given x1 hydralazine 10 mg IVP. Restarted home amlodipine 5mg.  : GEORGE. OOB to chair.     : GEORGE, mucomyst added for thick secretions, simethicone for abd distension, abd xray with stool burden, increased bowel regimen.   : GEORGE overnight.   : GEORGE overnight.   : GEORGE overnight  12/15: o/n Patient became tachycardic HR 120s and 10 minutes later O2 sat dropped as low as 89%. Patient suctioned without improvement in O2 sat and tube feeds found in suction catheter. TFs held.  STAT CXR ordered. STAT labs sent. Respiratory therapist called to bedside and patient trach connected to ventilator. After connection to ventilator and further suctioning O2 sat improved to 97% but patient HR remains 120-130s. Upgraded to NSICU for further management. Vancomycin and zosyn started. CTA  chest PE protocol and CTH ordered. Blood cultures sent.given 500cc bolus, rpt ABG sent pO2 243, CTH and CTA chest done. FS while NPO, FiO2 dec 50 pending ABG. sputum cx positive for few GPC and GNR.   : GEORGE overnight, restarted amlodipine, troponin 75   : GEORGE overnight, neuro stable. Attempted CPAP this morning, did not tolerate and back on full vent support. Dc'd Vanc. Tachycardia to 140s, noted extremities to be twitching along with jaw twitching. Given 2g of Keppra, total 4mg ativan and placed on EEG, full set of labs and lactate negative. Resumed trickle feeds at 20cc/hr. Given 250cc albumin for tachycardia.   : GEORGE overnight. Neuro stable. CTH stable. EEG negative and dc'd.   : GEORGE overnight. neuro stable. SIMV most of day, AC/VC at night.   : GEORGE overnight, neuro stable. remains on VC/AC  : GEORGE ovn. 1L bolus for tachy to 120s-130s.   : GEORGE ovn. Tachy to 120s, given tylenol and IVF. 2mg IV ativan given for L jaw twitching. Sx resolved for 3 minutes and started again. Epilepsy contacted. Given 2mg IV ativan. Continued twitching. Increased keppra to 1500mg BID, 2mg ativan given. Propranolol started for refractory tachycardia. vEEG ordered. albumin given. POCUS performed, no b lines. Started on fosphenytoin, loaded w 20mg/kg then 100mg TID. febrile, pancx, started cefepime 2g q8, vancomycin  : GEORGE ovn. +focal motor seizures on eeg. ID consulted. Vancomycin dc'ed as per ID.  : GEORGE ovn, neuro stable. Baclofen 5 mg q12 started for hiccups. Na 129 from 134. Urine lytes c/w SIADH. Given 250cc 3% bolus. CT chest for infection w/u - f/u read. Repeat Na 136. UA negative  : GEORGE ovn.   : GEORGE ovn  : GEORGE overnight. Blood cx neg @ 4 days, DC cefepime per medicine (sputum colonized).   : Desaturation o/n to 80's, CXR obtained, pulse ox changed and sats resolved. Na 133 from 138, 250cc 3% bolus given. Cefepime resumed until  per ID. Rpt Na 138.  : GEORGE overnight. Na stable.   : GEORGE overnight. Family meeting with son, pt now DNR.   : GEORGE overnight.   : GEORGE overnight, neuro stable.  : GEORGE overnight, neuro stable. FW decreased to 100q6.   1/3: GEORGE overnight, neuro stable. fosphenytoin IV changed to pheytoin PO via PEG per epilepsy recommendations  : GEORGE overnight, neuro stable. FW incr. to 100q4  : GEORGE overnight, neuro stable.   : GEORGE overnight, neuro stable   : GEORGE overnight, neuro stable. BUN/Cr increased, increased FW to 200q6. Given 1L bolus of LR over 2 hours. Gabriel d/c'd, voiding, continue bladder scan q6.   : GEORGE overnight, neuro stable. BUN/Cr improving.  : GEORGE overnight, neuro stable.   1/10: GEORGE overnight, neuro stable.   : GEORGE overnight, neuro stable, vent settings stable.   : GEORGE overnight, neuro stable. Febrile to 102F, f/u pan cx, chest xray, given tylenol. Zosyn started.   : GEORGE overnight, neuro stable, cont Zosyn for presumed PNA, prelim sputum cx growing; few GS neg diplococci, mod GS neg rods, rare GS + rods, fever trend improved.       OVERNIGHT EVENTS:  Vital Signs Last 24 Hrs  T(C): 36.9 (2025 21:29), Max: 39.3 (2025 04:56)  T(F): 98.5 (2025 21:29), Max: 102.7 (2025 04:56)  HR: 80 (2025 00:07) (66 - 106)  BP: 104/70 (2025 00:07) (99/65 - 110/67)  BP(mean): 81 (2025 00:07) (78 - 85)  RR: 18 (2025 00:07) (14 - 18)  SpO2: 98% (2025 00:07) (96% - 99%)    Parameters below as of 2025 00:07  Patient On (Oxygen Delivery Method): ventilator    O2 Concentration (%): 40    I&O's Summary    2025 07:01  -  2025 07:00  --------------------------------------------------------  IN: 1600 mL / OUT: 1280 mL / NET: 320 mL    2025 07:01  -  2025 00:37  --------------------------------------------------------  IN: 1660 mL / OUT: 950 mL / NET: 710 mL        PHYSICAL EXAM:  Constitutional: NAD, lying in bed.  HEENT: Pupils equal, round, reactive to light.   Respiratory: +Trach to mechanical vent. No respiratory distress, symmetric chest rise  Cardiovascular: Regular rate and rhythm    Gastrointestinal: +PEG, Abdomen soft, nondistended.  Neurological:  AAOX0-1. Opens eyes. Tracks vertically  Motor: RUE squeezes hand to comman, LUE 0/5, B/l LE w/d.   Sensation: unable to assess  Extremities: Warm, well perfused.    TUBES/LINES:  [] CVC  [] A-line  [] Lumbar Drain  [] Ventriculostomy  [] Other    DIET:  [] NPO  [] Mechanical  [X] Tube feeds        LABS:                        10.3   11.87 )-----------( 242      ( 2025 06:42 )             32.2     -12    138  |  103  |  60[H]  ----------------------------<  123[H]  3.9   |  23  |  1.33[H]    Ca    9.2      2025 06:42  Phos  3.5       Mg     2.1             Urinalysis Basic - ( 2025 15:25 )    Color: Yellow / Appearance: Cloudy / S.020 / pH: x  Gluc: x / Ketone: Negative mg/dL  / Bili: Negative / Urobili: 0.2 mg/dL   Blood: x / Protein: 100 mg/dL / Nitrite: Negative   Leuk Esterase: Large / RBC: 1 /HPF /  /HPF   Sq Epi: x / Non Sq Epi: 0 /HPF / Bacteria: Many /HPF          CAPILLARY BLOOD GLUCOSE          Drug Levels: [] N/A    CSF Analysis: [] N/A      Allergies    Allergy Status Unknown    Intolerances      MEDICATIONS:  Antibiotics:  piperacillin/tazobactam IVPB.. 3.375 Gram(s) IV Intermittent every 8 hours    Neuro:  acetaminophen   Oral Liquid .. 650 milliGRAM(s) Oral every 6 hours PRN  baclofen 5 milliGRAM(s) Oral every 12 hours  levETIRAcetam 1500 milliGRAM(s) Oral two times a day  phenytoin   Suspension 100 milliGRAM(s) Oral every 8 hours    Anticoagulation:  heparin   Injectable 5000 Unit(s) SubCutaneous every 8 hours    OTHER:  acetylcysteine 10%  Inhalation 4 milliLiter(s) Inhalation every 6 hours  albuterol/ipratropium for Nebulization 3 milliLiter(s) Nebulizer every 6 hours  amLODIPine   Tablet 5 milliGRAM(s) Oral daily  artificial tears (preservative free) Ophthalmic Solution 1 Drop(s) Right EYE every 4 hours  chlorhexidine 0.12% Liquid 15 milliLiter(s) Oral Mucosa every 12 hours  chlorhexidine 2% Cloths 1 Application(s) Topical <User Schedule>  doxazosin 8 milliGRAM(s) Oral at bedtime  erythromycin   Ointment 1 Application(s) Right EYE at bedtime  fluticasone propionate 50 MICROgram(s)/spray Nasal Spray 1 Spray(s) Both Nostrils two times a day  influenza   Vaccine 0.5 milliLiter(s) IntraMuscular once  ofloxacin 0.3% Solution 1 Drop(s) Right EYE four times a day  pantoprazole  Injectable 40 milliGRAM(s) IV Push daily  petrolatum Ophthalmic Ointment 1 Application(s) Both EYES two times a day  propranolol 10 milliGRAM(s) Oral every 8 hours  sodium chloride 0.65% Nasal 1 Spray(s) Both Nostrils two times a day    IVF:    CULTURES:    RADIOLOGY & ADDITIONAL TESTS:      ASSESSMENT:  44M PMH ?stroke/MI present to [facility] after collapsing at work. Decorticate posturing, vomiting, intubated for airway protection. 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 Transferred to [facility] for further management. s/p trach 10/14. s/p peg 10/21. Re-upgrade to ICU 2/2 desaturation event and suctioning requirements 11/15. Re-upgrade to NSICU 12/15 2/2 desaturation and tachycardia.      AMS    Intubate    Handoff    MEWS Score    Acute myocardial infarction    CVA (cerebral vascular accident)    Intracerebral hemorrhage of brain stem    Brainstem stroke    Brain stem stroke syndrome    Brain stem hemorrhage    Brain stem stroke syndrome    Hemorrhagic stroke    Brainstem stroke    Encephalopathy acute    Functional quadriplegia    Advanced care planning/counseling discussion    Encounter for palliative care    Pontine hemorrhage    Neurogenic dysphagia    Chronic respiratory failure    Acute kidney injury superimposed on CKD    Acute urinary retention    Hypertensive emergency    Sepsis, unspecified organism    Sepsis    Gram-negative bacteremia    Dyspnea    Percutaneous tracheal puncture    Altered mental status examination    EGD, with PEG    AMS    Room Service Assist    [email or web address]        PLAN:  Neuro:  - neuro/vitals q4h  - pain control: tylenol prn  - seizure tx: keppra 1500mg BID, phenytoin 100mg PO TID  - vEEG (10/3-) negative, (10/17-10/19) negative, (-) negative, (- ) +focal motor seizures not correlating w/ EEG  - epilepsy following  - CTH : enlarged pontine hemorrhage, CTH 10/3: stable, CTH 10/25: mostly resolved pontine hemorrhage, CTH 12/15: R mastoid air cell opacification; acute otitis media vs sterile effusion, CTH : stable.  - MRI brain 10/12: parenchymal hemorrhage, acute/subacute R cerebellar stroke    - stroke neuro signed off    CV:  - -160  - tachycardia: propranolol 10mg q8  - HTN: amlodipine 5mg  - echo () EF 75%, repeat : 57%    Resp:  - trach to vent, AC/VC 40/400/14/6  - CTA PE protocol 12/15 negative   - Secretions: duonebs/mucomyst/chest PT q6h   - CT Chest  for infectious w/u: b/l LL atelectasis and opacities     GI:  - TF via PEG (placed 10/21 by gen surg)  - bowel regimen held for loose stool, last BM   - baclofen 5q12 for hiccups     Renal:  - IVL, FW 200q6 for hydration   - Urinary retenion: Gabriel removed , passed TOV  - CKD: trend BUN/Cr  - renal US 10/1: echogenicity c/w chronic med renal dz, repeat 10/8: inc renal echogeicity, c/f medical renal disease w increased hydro     Endo:  - A1c 5.4    Heme:  - DVT ppx: SCDs, SQH 5000u q8h   - LE dopplers negative     ID:  - last pan cx   - empiric PNA: zosyn (- ), cefepime (-), s/p vanc (-), s/p zosyn (12/15-), s/p vanc (12/15-)  - s/p Stenotrophomonas maltophilia PNA: s/p Bactrim (-) s/p Cefepime (-)  - 11/3, (+) sputum for stenotrophomas maltophlia, blood cx (+) klebsiella, cefepime 2gq12 ( - )   - empiric tx: s/p zosyn (11/3-) , s/p vanc  (11/3-)  - S/p Ancef (10/4-10/14) for PNA, and s/p Unasyn (10/18-10/23) +actinobacter baumanii     MISC:  - ophtho consult for keratitis  - erythromycin ointment R eye q4hrs, ofloxacin ointment R eye QID, artificial tears R eye q2hrs, moisture chamber at bedtime    Dispo: SDU status, DNR, pending PRUCOL for benefits     D/w Dr. D'Amico

## 2025-01-13 NOTE — PROGRESS NOTE ADULT - ASSESSMENT
44M with unclear PMH (?stroke, MI) who was found down at work, intubated for airway protection and found to have acute parenchymal hemorrhage within nabil with mass effect (+ acute/subacute right cerebellar infarct) in setting of hypertensive emergency, transferred to [facility] for further neurosurgical care. Hospital course c/b poor neurologic recovery s/p trach-PEG, AUR s/p gabriel, NAIKA on CKD c/b hyperkalemia, HAP s/p amp-sulbactam (EOT 10/23), K aerogenes bacteremia  treated with 2 weeks of Cefepime per ID , On 11/15 he became septic and was transferred to NSICU due to increased o2 requirements and needed Vent support , Trach Cultures + for Stenotrophomonas which was treated with 7 days of Bactrim per ID , has been weaned of Vent since 11/23 and is now on 10lts at 40% o2 through Trach Collar. Stepped up to the NSICU on 12/15 for hypoxia and tachycardia. PE ruled out. On abx for 5 days. Unclear etiology. Now stepped down to 8Lach.    # Pontine hemorrhage  # Encephalopathy due to Intracranial Bleed   - Acute parenchymal hemorrhage within nabil with mass effect (+ acute/subacute right cerebellar infarct) in setting of hypertensive emergency.   - MRI brain also demonstrated acute/subacute R cerebellar stroke.   - No Neurosurgical Interventions were performed   - Secondary to IPH and cerebellar stroke patient has become Trach and Peg Dependent    #Fever   -Started on Pip/Tazo, leucocytosis resolved. Will continue on same, follow-up Bcx  -Sputum culture noted, follow-up final results. In view of improvement with Pip/Tazo, will continue on same.    #Acute kidney injury  # Acute Tubular Necrosis superimposed on CKD stage III  - s/p US renal with chronic medical renal disease  - Increase Free water to 200 q4h. Monitor UOP, bladder scan per protocol. Further management based on creatinine trend.    # Seizures  - Continue Seizure precautions   - Continue  Keppra and Fosphenytoin     # Hypertension  - Continue amlodipine 5mg daily with holding parameters  - Will continue to monitor BP and titrate antihypertensive as required     # Chronic respiratory failure.   - S/p percutaneous trach by pulm on 10/14/24  - Currently on Vent Support   - Continue Chest PT    # Neurogenic dysphagia.   - Pt s/p PEG with surgery 10/21/24  - TF per nutrition  - aspiration precautions and elevation of HOB.    #Acute urinary retention.   - doxazosin 8mg  - Will continue to monitor urine oupt     # Functional quadriplegia in setting of brainstem hemorrhage  - decub precautions  - care per nursing protocol     # DVT prop: Heparin SQ q8  Discussed with primary team

## 2025-01-13 NOTE — PROGRESS NOTE ADULT - TIME BILLING
Bedside exam   Reviewed vitals, labs, hospital course  Discussed patient's plan of care with primary  team   Documentation of encounter

## 2025-01-14 LAB
ANION GAP SERPL CALC-SCNC: 14 MMOL/L — SIGNIFICANT CHANGE UP (ref 5–17)
BUN SERPL-MCNC: 64 MG/DL — HIGH (ref 7–23)
CALCIUM SERPL-MCNC: 9 MG/DL — SIGNIFICANT CHANGE UP (ref 8.4–10.5)
CHLORIDE SERPL-SCNC: 100 MMOL/L — SIGNIFICANT CHANGE UP (ref 96–108)
CO2 SERPL-SCNC: 24 MMOL/L — SIGNIFICANT CHANGE UP (ref 22–31)
CREAT SERPL-MCNC: 1.57 MG/DL — HIGH (ref 0.5–1.3)
CULTURE RESULTS: ABNORMAL
EGFR: 55 ML/MIN/1.73M2 — LOW
EGFR: 55 ML/MIN/1.73M2 — LOW
GLUCOSE SERPL-MCNC: 102 MG/DL — HIGH (ref 70–99)
HCT VFR BLD CALC: 30.5 % — LOW (ref 39–50)
HGB BLD-MCNC: 9.8 G/DL — LOW (ref 13–17)
MAGNESIUM SERPL-MCNC: 2.4 MG/DL — SIGNIFICANT CHANGE UP (ref 1.6–2.6)
MCHC RBC-ENTMCNC: 31 PG — SIGNIFICANT CHANGE UP (ref 27–34)
MCHC RBC-ENTMCNC: 32.1 G/DL — SIGNIFICANT CHANGE UP (ref 32–36)
MCV RBC AUTO: 96.5 FL — SIGNIFICANT CHANGE UP (ref 80–100)
NRBC # BLD: 0 /100 WBCS — SIGNIFICANT CHANGE UP (ref 0–0)
NRBC BLD-RTO: 0 /100 WBCS — SIGNIFICANT CHANGE UP (ref 0–0)
PHOSPHATE SERPL-MCNC: 3.8 MG/DL — SIGNIFICANT CHANGE UP (ref 2.5–4.5)
PLATELET # BLD AUTO: 221 K/UL — SIGNIFICANT CHANGE UP (ref 150–400)
POTASSIUM SERPL-MCNC: 3.7 MMOL/L — SIGNIFICANT CHANGE UP (ref 3.5–5.3)
POTASSIUM SERPL-SCNC: 3.7 MMOL/L — SIGNIFICANT CHANGE UP (ref 3.5–5.3)
RBC # BLD: 3.16 M/UL — LOW (ref 4.2–5.8)
RBC # FLD: 14.1 % — SIGNIFICANT CHANGE UP (ref 10.3–14.5)
SODIUM SERPL-SCNC: 138 MMOL/L — SIGNIFICANT CHANGE UP (ref 135–145)
SPECIMEN SOURCE: SIGNIFICANT CHANGE UP
WBC # BLD: 8.87 K/UL — SIGNIFICANT CHANGE UP (ref 3.8–10.5)
WBC # FLD AUTO: 8.87 K/UL — SIGNIFICANT CHANGE UP (ref 3.8–10.5)

## 2025-01-14 PROCEDURE — 99233 SBSQ HOSP IP/OBS HIGH 50: CPT

## 2025-01-14 PROCEDURE — 99231 SBSQ HOSP IP/OBS SF/LOW 25: CPT

## 2025-01-14 RX ORDER — SODIUM CHLORIDE 9 G/1000ML
1000 INJECTION, SOLUTION INTRAVENOUS ONCE
Refills: 0 | Status: COMPLETED | OUTPATIENT
Start: 2025-01-14 | End: 2025-01-14

## 2025-01-14 RX ORDER — ACETAMINOPHEN 500 MG/5ML
1000 LIQUID (ML) ORAL ONCE
Refills: 0 | Status: COMPLETED | OUTPATIENT
Start: 2025-01-14 | End: 2025-01-14

## 2025-01-14 RX ADMIN — Medication 25 GRAM(S): at 15:07

## 2025-01-14 RX ADMIN — Medication 1 DROP(S): at 02:35

## 2025-01-14 RX ADMIN — Medication 25 GRAM(S): at 22:47

## 2025-01-14 RX ADMIN — Medication 650 MILLIGRAM(S): at 10:22

## 2025-01-14 RX ADMIN — OFLOXACIN 1 DROP(S): 3 SOLUTION OPHTHALMIC at 23:08

## 2025-01-14 RX ADMIN — ACETYLCYSTEINE 4 MILLILITER(S): 200 INHALANT RESPIRATORY (INHALATION) at 00:19

## 2025-01-14 RX ADMIN — FLUTICASONE PROPIONATE 1 SPRAY(S): 50 SPRAY, METERED NASAL at 17:32

## 2025-01-14 RX ADMIN — IPRATROPIUM BROMIDE AND ALBUTEROL SULFATE 3 MILLILITER(S): .5; 2.5 SOLUTION RESPIRATORY (INHALATION) at 23:06

## 2025-01-14 RX ADMIN — Medication 25 GRAM(S): at 06:43

## 2025-01-14 RX ADMIN — Medication 1 SPRAY(S): at 06:44

## 2025-01-14 RX ADMIN — ACETYLCYSTEINE 4 MILLILITER(S): 200 INHALANT RESPIRATORY (INHALATION) at 11:32

## 2025-01-14 RX ADMIN — SODIUM CHLORIDE 333.33 MILLILITER(S): 9 INJECTION, SOLUTION INTRAVENOUS at 10:59

## 2025-01-14 RX ADMIN — Medication 1 DROP(S): at 09:29

## 2025-01-14 RX ADMIN — Medication 1 SPRAY(S): at 17:32

## 2025-01-14 RX ADMIN — OFLOXACIN 1 DROP(S): 3 SOLUTION OPHTHALMIC at 11:32

## 2025-01-14 RX ADMIN — OFLOXACIN 1 DROP(S): 3 SOLUTION OPHTHALMIC at 17:31

## 2025-01-14 RX ADMIN — IPRATROPIUM BROMIDE AND ALBUTEROL SULFATE 3 MILLILITER(S): .5; 2.5 SOLUTION RESPIRATORY (INHALATION) at 06:44

## 2025-01-14 RX ADMIN — Medication 400 MILLIGRAM(S): at 00:50

## 2025-01-14 RX ADMIN — FLUTICASONE PROPIONATE 1 SPRAY(S): 50 SPRAY, METERED NASAL at 06:43

## 2025-01-14 RX ADMIN — HEPARIN SODIUM 5000 UNIT(S): 1000 INJECTION INTRAVENOUS; SUBCUTANEOUS at 15:06

## 2025-01-14 RX ADMIN — Medication 100 MILLIGRAM(S): at 22:46

## 2025-01-14 RX ADMIN — Medication 1 DROP(S): at 17:32

## 2025-01-14 RX ADMIN — Medication 1 APPLICATION(S): at 07:12

## 2025-01-14 RX ADMIN — Medication 100 MILLIGRAM(S): at 12:10

## 2025-01-14 RX ADMIN — DOXAZOSIN MESYLATE 8 MILLIGRAM(S): 8 TABLET ORAL at 22:47

## 2025-01-14 RX ADMIN — ACETYLCYSTEINE 4 MILLILITER(S): 200 INHALANT RESPIRATORY (INHALATION) at 06:44

## 2025-01-14 RX ADMIN — ACETYLCYSTEINE 4 MILLILITER(S): 200 INHALANT RESPIRATORY (INHALATION) at 17:31

## 2025-01-14 RX ADMIN — OFLOXACIN 1 DROP(S): 3 SOLUTION OPHTHALMIC at 06:43

## 2025-01-14 RX ADMIN — ERYTHROMYCIN 1 APPLICATION(S): 5 OINTMENT OPHTHALMIC at 22:48

## 2025-01-14 RX ADMIN — Medication 15 MILLILITER(S): at 06:44

## 2025-01-14 RX ADMIN — BACLOFEN 5 MILLIGRAM(S): 10 INJECTION INTRATHECAL at 06:45

## 2025-01-14 RX ADMIN — ACETYLCYSTEINE 4 MILLILITER(S): 200 INHALANT RESPIRATORY (INHALATION) at 23:05

## 2025-01-14 RX ADMIN — Medication 1000 MILLIGRAM(S): at 01:20

## 2025-01-14 RX ADMIN — Medication 1 APPLICATION(S): at 07:11

## 2025-01-14 RX ADMIN — Medication 1 APPLICATION(S): at 17:33

## 2025-01-14 RX ADMIN — HEPARIN SODIUM 5000 UNIT(S): 1000 INJECTION INTRAVENOUS; SUBCUTANEOUS at 22:46

## 2025-01-14 RX ADMIN — LEVETIRACETAM 1500 MILLIGRAM(S): 10 INJECTION, SOLUTION INTRAVENOUS at 17:32

## 2025-01-14 RX ADMIN — Medication 1 DROP(S): at 15:06

## 2025-01-14 RX ADMIN — Medication 1 DROP(S): at 22:46

## 2025-01-14 RX ADMIN — LEVETIRACETAM 1500 MILLIGRAM(S): 10 INJECTION, SOLUTION INTRAVENOUS at 06:45

## 2025-01-14 RX ADMIN — IPRATROPIUM BROMIDE AND ALBUTEROL SULFATE 3 MILLILITER(S): .5; 2.5 SOLUTION RESPIRATORY (INHALATION) at 11:33

## 2025-01-14 RX ADMIN — HEPARIN SODIUM 5000 UNIT(S): 1000 INJECTION INTRAVENOUS; SUBCUTANEOUS at 06:45

## 2025-01-14 RX ADMIN — Medication 1 DROP(S): at 06:43

## 2025-01-14 RX ADMIN — Medication 15 MILLILITER(S): at 17:33

## 2025-01-14 RX ADMIN — Medication 100 MILLIGRAM(S): at 06:44

## 2025-01-14 RX ADMIN — IPRATROPIUM BROMIDE AND ALBUTEROL SULFATE 3 MILLILITER(S): .5; 2.5 SOLUTION RESPIRATORY (INHALATION) at 17:31

## 2025-01-14 RX ADMIN — Medication 20 MILLIEQUIVALENT(S): at 07:53

## 2025-01-14 RX ADMIN — IPRATROPIUM BROMIDE AND ALBUTEROL SULFATE 3 MILLILITER(S): .5; 2.5 SOLUTION RESPIRATORY (INHALATION) at 00:19

## 2025-01-14 RX ADMIN — BACLOFEN 5 MILLIGRAM(S): 10 INJECTION INTRATHECAL at 17:32

## 2025-01-14 RX ADMIN — Medication 40 MILLIGRAM(S): at 11:32

## 2025-01-14 RX ADMIN — OFLOXACIN 1 DROP(S): 3 SOLUTION OPHTHALMIC at 00:19

## 2025-01-14 RX ADMIN — Medication 650 MILLIGRAM(S): at 10:40

## 2025-01-14 NOTE — PROGRESS NOTE ADULT - SUBJECTIVE AND OBJECTIVE BOX
HPI:  Unknown age male, unknown past medical history (reported stroke and MI by coworkers) presented to [facility] with AMS, Pt was working at [employer] when he was found down by coworkers. EMS called and pt brought to [facility] ED. Intubated, sedated, started on cardene for SBPs in 200s. CT head showed brain stem bleed. Transferred to NSICU for further management.  (30 Sep 2024 12:55)    OVERNIGHT EVENTS: Febrile, given IV tylenol.     Hospital Course: rest of course per handoff  1/1: GEORGE overnight, neuro stable.  1/2: GEORGE overnight, neuro stable. FW decreased to 100q6.   1/3: GEORGE overnight, neuro stable. fosphenytoin IV changed to pheytoin PO via PEG per epilepsy recommendations  1/4: GEORGE overnight, neuro stable. FW incr. to 100q4  1/5: GEORGE overnight, neuro stable.   1/6: GEORGE overnight, neuro stable   1/7: GEORGE overnight, neuro stable. BUN/Cr increased, increased FW to 200q6. Given 1L bolus of LR over 2 hours. Vuong d/c'd, voiding, continue bladder scan q6.   1/8: GEORGE overnight, neuro stable. BUN/Cr improving.  1/9: GEORGE overnight, neuro stable.   1/10: GEORGE overnight, neuro stable.   1/11: GEORGE overnight, neuro stable, vent settings stable.   1/12: GEORGE overnight, neuro stable. Febrile to 102F, f/u pan cx, chest xray, given tylenol. Zosyn started.   1/13: GEORGE overnight, neuro stable, cont Zosyn for presumed PNA, prelim sputum cx growing; few GS neg diplococci, mod GS neg rods, rare GS + rods, fever trend improved. Free water increased to 003mof0.     Vital Signs Last 24 Hrs  T(C): 38.2 (14 Jan 2025 00:03), Max: 38.2 (13 Jan 2025 22:03)  T(F): 100.7 (14 Jan 2025 00:03), Max: 100.7 (13 Jan 2025 22:03)  HR: 86 (14 Jan 2025 00:03) (72 - 92)  BP: 103/60 (14 Jan 2025 00:03) (103/60 - 110/67)  BP(mean): 75 (14 Jan 2025 00:03) (75 - 89)  RR: 17 (14 Jan 2025 00:03) (14 - 17)  SpO2: 99% (14 Jan 2025 00:03) (97% - 100%)    Parameters below as of 14 Jan 2025 00:03  Patient On (Oxygen Delivery Method): ventilator    O2 Concentration (%): 40    I&O's Summary    12 Jan 2025 07:01  -  13 Jan 2025 07:00  --------------------------------------------------------  IN: 2160 mL / OUT: 1300 mL / NET: 860 mL    13 Jan 2025 07:01  -  14 Jan 2025 00:25  --------------------------------------------------------  IN: 1100 mL / OUT: 505 mL / NET: 595 mL        PHYSICAL EXAM:  Constitutional: NAD, resting comfortably in bed.   HEENT: Pupils equal, round, reactive to light. EOMI. Face symmetric, tongue midline.  Respiratory: +trach. No respiratory distress, lungs clear to auscultation bilaterally.   Cardiovascular: RRR, S1, S2.   Gastrointestinal: +PEG. Abdomen soft, non tender, nondistended.  Neurological:  AAOX1. Opens eyes. Not following commands  Motor: RUE trace HG, RLE w/d, DARRELL and LL 0/5.  Extremities: Warm, well perfused.    TUBES/LINES:  [] Vuong  [] Lumbar Drain  [] Wound Drains  [x] Others - trach, peg    DIET:  [] NPO  [] Mechanical  [x] Tube feeds    LABS:                        10.0   7.86  )-----------( 215      ( 13 Jan 2025 05:30 )             31.6     01-13    138  |  100  |  62[H]  ----------------------------<  120[H]  3.8   |  22  |  1.46[H]    Ca    9.1      13 Jan 2025 05:30  Phos  4.5     01-13  Mg     2.3     01-13    Urinalysis Basic - ( 13 Jan 2025 05:30 )  Color: x / Appearance: x / SG: x / pH: x  Gluc: 120 mg/dL / Ketone: x  / Bili: x / Urobili: x   Blood: x / Protein: x / Nitrite: x   Leuk Esterase: x / RBC: x / WBC x   Sq Epi: x / Non Sq Epi: x / Bacteria: x    CAPILLARY BLOOD GLUCOSE    Drug Levels: [] N/A  CSF Analysis: [] N/A    Allergies  AllergyStatus Unknown  Intolerances    MEDICATIONS:  Antibiotics:  piperacillin/tazobactam IVPB.. 3.375 Gram(s) IV Intermittent every 8 hours    Neuro:  acetaminophen   IVPB .. 1000 milliGRAM(s) IV Intermittent once  acetaminophen   Oral Liquid .. 650 milliGRAM(s) Oral every 6 hours PRN  baclofen 5 milliGRAM(s) Oral every 12 hours  levETIRAcetam 1500 milliGRAM(s) Oral two times a day  phenytoin   Suspension 100 milliGRAM(s) Oral every 8 hours    Anticoagulation:  heparin   Injectable 5000 Unit(s) SubCutaneous every 8 hours    OTHER:  acetylcysteine 10%  Inhalation 4 milliLiter(s) Inhalation every 6 hours  albuterol/ipratropium for Nebulization 3 milliLiter(s) Nebulizer every 6 hours  amLODIPine   Tablet 5 milliGRAM(s) Oral daily  artificial tears (preservative free) Ophthalmic Solution 1 Drop(s) Right EYE every 4 hours  chlorhexidine 0.12% Liquid 15 milliLiter(s) Oral Mucosa every 12 hours  chlorhexidine 2% Cloths 1 Application(s) Topical <User Schedule>  doxazosin 8 milliGRAM(s) Oral at bedtime  erythromycin   Ointment 1 Application(s) Right EYE at bedtime  fluticasone propionate 50 MICROgram(s)/spray Nasal Spray 1 Spray(s) Both Nostrils two times a day  influenza   Vaccine 0.5 milliLiter(s) IntraMuscular once  ofloxacin 0.3% Solution 1 Drop(s) Right EYE four times a day  pantoprazole  Injectable 40 milliGRAM(s) IV Push daily  petrolatum Ophthalmic Ointment 1 Application(s) Both EYES two times a day  propranolol 10 milliGRAM(s) Oral every 8 hours  sodium chloride 0.65% Nasal 1 Spray(s) Both Nostrils two times a day    IVF:    CULTURES:  Culture Results:   >=3 organisms. Probable collection contamination. (01-12 @ 15:25)  Culture Results:   No growth at 24 hours (01-12 @ 12:50)    RADIOLOGY & ADDITIONAL TESTS:      ASSESSMENT:  44M PMH ?stroke/MI present to [facility] after collapsing at work. Decorticate posturing, vomiting, intubated for airway protection. 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 Transferred to [facility] for further management. s/p trach 10/14. s/p peg 10/21. Re-upgrade to ICU 2/2 desaturation event and suctioning requirements 11/15. Re-upgrade to NSICU 12/15 2/2 desaturation and tachycardia.    Neuro:  - neuro/vitals q4h  - pain control: tylenol prn  - seizure tx: keppra 1500mg BID, phenytoin 100mg PO TID  - vEEG (10/3-4) negative, (10/17-10/19) negative, (12/17-12/18) negative, (12/22-12/25 ) +focal motor seizures not correlating w/ EEG  - epilepsy following  - CTH 9/30: enlarged pontine hemorrhage, CTH 10/3: stable, CTH 10/25: mostly resolved pontine hemorrhage, CTH 12/15: R mastoid air cell opacification; acute otitis media vs sterile effusion, CTH 12/18: stable.  - MRI brain 10/12: parenchymal hemorrhage, acute/subacute R cerebellar stroke    - stroke neuro signed off    CV:  - -160  - tachycardia: propranolol 10mg q8  - HTN: amlodipine 5mg  - echo (9/30) EF 75%, repeat 12/16: 57%    Resp:  - trach to vent, AC/VC 40/400/14/6  - CTA PE protocol 12/15 negative   - Secretions: duonebs/mucomyst/chest PT q6h   - CT Chest 12/24 for infectious w/u: b/l LL atelectasis and opacities     GI:  - TF via PEG (placed 10/21 by gen surg)  - bowel regimen held for loose stool, last BM 1/5  - baclofen 5q12 for hiccups     Renal:  - IVL, FW 200q4 for hydration   - Urinary retenion: Vuong removed 1/7, passed TOV  - CKD: trend BUN/Cr  - renal US 10/1: echogenicity c/w chronic med renal dz, repeat 10/8: inc renal echogeicity, c/f medical renal disease w increased hydro     Endo:  - A1c 5.4    Heme:  - DVT ppx: SCDs, SQH 5000u q8h   - LE dopplers negative 12/16    ID:  - last pan cx 1/12  - zosyn started (1/12-  - empiric PNA: zosyn (1/12- ), cefepime (12/22-12/30), s/p vanc (12/22-12/23), s/p zosyn (12/15-12/20), s/p vanc (12/15-12/16)  - s/p Stenotrophomonas maltophilia PNA: s/p Bactrim (11/18-11/19) s/p Cefepime (11/16-11/18)  - 11/3, (+) sputum for stenotrophomas maltophlia, blood cx (+) klebsiella, cefepime 2gq12 (11/6 - 11/12)   - empiric tx: s/p zosyn (11/3-11/6) , s/p vanc  (11/3-11/5)  - S/p Ancef (10/4-10/14) for PNA, and s/p Unasyn (10/18-10/23) +actinobacter baumanii     MISC:  - ophtho consult for keratitis  - erythromycin ointment R eye q4hrs, ofloxacin ointment R eye QID, artificial tears R eye q2hrs, moisture chamber at bedtime    Dispo: SDU status, DNR, pending PRUCOL for benefits     D/w Dr. D'Amico

## 2025-01-14 NOTE — PROGRESS NOTE ADULT - SUBJECTIVE AND OBJECTIVE BOX
SUBJECTIVE:  Patient was seen and examined at bedside.  Tmax 100.7, remains unresponsive on ventilator. No other complaints or events reported    Review of systems: unable to obtain     Diet, NPO with Tube Feed:   Tube Feeding Modality: Gastrostomy  Maribeth Sauceda Renal Support 1.8  Total Volume for 24 Hours (mL): 1080  Total Number of Cans: 5  Continuous  Starting Tube Feed Rate mL per Hour: 30  Increase Tube Feed Rate by (mL): 10     Every 4 hours  Until Goal Tube Feed Rate (mL per Hour): 60  Tube Feed Duration (in Hours): 18  Tube Feed Start Time: 10:00  Tube Feed Stop Time: 04:00  Banatrol TF     Qty per Day:  2 (12-18-24 @ 09:33) [Active]      MEDICATIONS:  MEDICATIONS  (STANDING):  acetylcysteine 10%  Inhalation 4 milliLiter(s) Inhalation every 6 hours  albuterol/ipratropium for Nebulization 3 milliLiter(s) Nebulizer every 6 hours  amLODIPine   Tablet 5 milliGRAM(s) Oral daily  artificial tears (preservative free) Ophthalmic Solution 1 Drop(s) Right EYE every 4 hours  baclofen 5 milliGRAM(s) Oral every 12 hours  chlorhexidine 0.12% Liquid 15 milliLiter(s) Oral Mucosa every 12 hours  chlorhexidine 2% Cloths 1 Application(s) Topical <User Schedule>  doxazosin 8 milliGRAM(s) Oral at bedtime  erythromycin   Ointment 1 Application(s) Right EYE at bedtime  fluticasone propionate 50 MICROgram(s)/spray Nasal Spray 1 Spray(s) Both Nostrils two times a day  heparin   Injectable 5000 Unit(s) SubCutaneous every 8 hours  influenza   Vaccine 0.5 milliLiter(s) IntraMuscular once  levETIRAcetam 1500 milliGRAM(s) Oral two times a day  ofloxacin 0.3% Solution 1 Drop(s) Right EYE four times a day  pantoprazole  Injectable 40 milliGRAM(s) IV Push daily  petrolatum Ophthalmic Ointment 1 Application(s) Both EYES two times a day  phenytoin   Suspension 100 milliGRAM(s) Oral every 8 hours  piperacillin/tazobactam IVPB.. 3.375 Gram(s) IV Intermittent every 8 hours  propranolol 10 milliGRAM(s) Oral every 8 hours  sodium chloride 0.65% Nasal 1 Spray(s) Both Nostrils two times a day    MEDICATIONS  (PRN):  acetaminophen   Oral Liquid .. 650 milliGRAM(s) Oral every 6 hours PRN Temp greater or equal to 38.5C (101.3F), Mild Pain (1 - 3)      Allergies    Allergy Status Unknown    Intolerances        OBJECTIVE:  Vital Signs Last 24 Hrs  T(C): 36.3 (14 Jan 2025 09:12), Max: 38.2 (13 Jan 2025 22:03)  T(F): 97.3 (14 Jan 2025 09:12), Max: 100.7 (13 Jan 2025 22:03)  HR: 56 (14 Jan 2025 09:05) (56 - 92)  BP: 109/80 (14 Jan 2025 08:25) (96/61 - 109/80)  BP(mean): 91 (14 Jan 2025 08:25) (74 - 91)  RR: 18 (14 Jan 2025 09:05) (14 - 24)  SpO2: 100% (14 Jan 2025 10:09) (97% - 100%)    Parameters below as of 14 Jan 2025 10:09  Patient On (Oxygen Delivery Method): ventilator      I&O's Summary    13 Jan 2025 07:01  -  14 Jan 2025 07:00  --------------------------------------------------------  IN: 1640 mL / OUT: 1105 mL / NET: 535 mL    14 Jan 2025 07:01  -  14 Jan 2025 11:23  --------------------------------------------------------  IN: 250 mL / OUT: 20 mL / NET: 230 mL        PHYSICAL EXAM:    LABS:                        9.8    8.87  )-----------( 221      ( 14 Jan 2025 05:30 )             30.5     01-14    138  |  100  |  64[H]  ----------------------------<  102[H]  3.7   |  24  |  1.57[H]    Ca    9.0      14 Jan 2025 05:30  Phos  3.8     01-14  Mg     2.4     01-14          CAPILLARY BLOOD GLUCOSE        Urinalysis Basic - ( 14 Jan 2025 05:30 )    Color: x / Appearance: x / SG: x / pH: x  Gluc: 102 mg/dL / Ketone: x  / Bili: x / Urobili: x   Blood: x / Protein: x / Nitrite: x   Leuk Esterase: x / RBC: x / WBC x   Sq Epi: x / Non Sq Epi: x / Bacteria: x        MICRODATA:    Culture - Urine (collected 12 Jan 2025 15:25)  Source: Clean Catch None  Final Report (13 Jan 2025 15:59):    >=3 organisms. Probable collection contamination.    Urinalysis with Rflx Culture (collected 12 Jan 2025 15:25)    Culture - Blood (collected 12 Jan 2025 12:50)  Source: .Blood BLOOD  Preliminary Report (13 Jan 2025 21:01):    No growth at 24 hours    Culture - Blood (collected 12 Jan 2025 12:50)  Source: .Blood BLOOD  Preliminary Report (13 Jan 2025 22:01):    No growth at 24 hours    Culture - Sputum (collected 12 Jan 2025 05:00)  Source: Trach Asp Tracheal Aspirate  Gram Stain (12 Jan 2025 16:07):    Numerous polymorphonuclear leukocytes per low power field    Few Squamous epithelial cells per low power field    Numerous Gram Negative Diplococci per oil power field    Moderate Gram Negative Rods per oil power field    Rare Gram Positive Rods per oil power field  Final Report (14 Jan 2025 07:24):    Mixed gram negative rods    Commensal iram consistent with body site        RADIOLOGY/OTHER STUDIES:   SUBJECTIVE:  Patient was seen and examined at bedside.  Tmax 100.7, remains unresponsive on ventilator. No other complaints or events reported    Review of systems: unable to obtain     Diet, NPO with Tube Feed:   Tube Feeding Modality: Gastrostomy  Maribeth Sauceda Renal Support 1.8  Total Volume for 24 Hours (mL): 1080  Total Number of Cans: 5  Continuous  Starting Tube Feed Rate mL per Hour: 30  Increase Tube Feed Rate by (mL): 10     Every 4 hours  Until Goal Tube Feed Rate (mL per Hour): 60  Tube Feed Duration (in Hours): 18  Tube Feed Start Time: 10:00  Tube Feed Stop Time: 04:00  Banatrol TF     Qty per Day:  2 (12-18-24 @ 09:33) [Active]      MEDICATIONS:  MEDICATIONS  (STANDING):  acetylcysteine 10%  Inhalation 4 milliLiter(s) Inhalation every 6 hours  albuterol/ipratropium for Nebulization 3 milliLiter(s) Nebulizer every 6 hours  amLODIPine   Tablet 5 milliGRAM(s) Oral daily  artificial tears (preservative free) Ophthalmic Solution 1 Drop(s) Right EYE every 4 hours  baclofen 5 milliGRAM(s) Oral every 12 hours  chlorhexidine 0.12% Liquid 15 milliLiter(s) Oral Mucosa every 12 hours  chlorhexidine 2% Cloths 1 Application(s) Topical <User Schedule>  doxazosin 8 milliGRAM(s) Oral at bedtime  erythromycin   Ointment 1 Application(s) Right EYE at bedtime  fluticasone propionate 50 MICROgram(s)/spray Nasal Spray 1 Spray(s) Both Nostrils two times a day  heparin   Injectable 5000 Unit(s) SubCutaneous every 8 hours  influenza   Vaccine 0.5 milliLiter(s) IntraMuscular once  levETIRAcetam 1500 milliGRAM(s) Oral two times a day  ofloxacin 0.3% Solution 1 Drop(s) Right EYE four times a day  pantoprazole  Injectable 40 milliGRAM(s) IV Push daily  petrolatum Ophthalmic Ointment 1 Application(s) Both EYES two times a day  phenytoin   Suspension 100 milliGRAM(s) Oral every 8 hours  piperacillin/tazobactam IVPB.. 3.375 Gram(s) IV Intermittent every 8 hours  propranolol 10 milliGRAM(s) Oral every 8 hours  sodium chloride 0.65% Nasal 1 Spray(s) Both Nostrils two times a day    MEDICATIONS  (PRN):  acetaminophen   Oral Liquid .. 650 milliGRAM(s) Oral every 6 hours PRN Temp greater or equal to 38.5C (101.3F), Mild Pain (1 - 3)      Allergies    Allergy Status Unknown    Intolerances        OBJECTIVE:  Vital Signs Last 24 Hrs  T(C): 36.3 (14 Jan 2025 09:12), Max: 38.2 (13 Jan 2025 22:03)  T(F): 97.3 (14 Jan 2025 09:12), Max: 100.7 (13 Jan 2025 22:03)  HR: 56 (14 Jan 2025 09:05) (56 - 92)  BP: 109/80 (14 Jan 2025 08:25) (96/61 - 109/80)  BP(mean): 91 (14 Jan 2025 08:25) (74 - 91)  RR: 18 (14 Jan 2025 09:05) (14 - 24)  SpO2: 100% (14 Jan 2025 10:09) (97% - 100%)    Parameters below as of 14 Jan 2025 10:09  Patient On (Oxygen Delivery Method): ventilator      I&O's Summary    13 Jan 2025 07:01  -  14 Jan 2025 07:00  --------------------------------------------------------  IN: 1640 mL / OUT: 1105 mL / NET: 535 mL    14 Jan 2025 07:01  -  14 Jan 2025 11:23  --------------------------------------------------------  IN: 250 mL / OUT: 20 mL / NET: 230 mL        PHYSICAL EXAM:  General: unresponsive on ventilator, NAD, no labored breathing   HEENT: trach collar in place, clean  Lungs: anterior exam, limited, no crackles, no wheezes  Heart: regular  Abdomen: soft, no distension, + BS  Extremities:  warm, trace edema, not following commands     LABS:                        9.8    8.87  )-----------( 221      ( 14 Jan 2025 05:30 )             30.5     01-14    138  |  100  |  64[H]  ----------------------------<  102[H]  3.7   |  24  |  1.57[H]    Ca    9.0      14 Jan 2025 05:30  Phos  3.8     01-14  Mg     2.4     01-14          CAPILLARY BLOOD GLUCOSE        Urinalysis Basic - ( 14 Jan 2025 05:30 )    Color: x / Appearance: x / SG: x / pH: x  Gluc: 102 mg/dL / Ketone: x  / Bili: x / Urobili: x   Blood: x / Protein: x / Nitrite: x   Leuk Esterase: x / RBC: x / WBC x   Sq Epi: x / Non Sq Epi: x / Bacteria: x        MICRODATA:    Culture - Urine (collected 12 Jan 2025 15:25)  Source: Clean Catch None  Final Report (13 Jan 2025 15:59):    >=3 organisms. Probable collection contamination.    Urinalysis with Rflx Culture (collected 12 Jan 2025 15:25)    Culture - Blood (collected 12 Jan 2025 12:50)  Source: .Blood BLOOD  Preliminary Report (13 Jan 2025 21:01):    No growth at 24 hours    Culture - Blood (collected 12 Jan 2025 12:50)  Source: .Blood BLOOD  Preliminary Report (13 Jan 2025 22:01):    No growth at 24 hours    Culture - Sputum (collected 12 Jan 2025 05:00)  Source: Trach Asp Tracheal Aspirate  Gram Stain (12 Jan 2025 16:07):    Numerous polymorphonuclear leukocytes per low power field    Few Squamous epithelial cells per low power field    Numerous Gram Negative Diplococci per oil power field    Moderate Gram Negative Rods per oil power field    Rare Gram Positive Rods per oil power field  Final Report (14 Jan 2025 07:24):    Mixed gram negative rods    Commensal iram consistent with body site        RADIOLOGY/OTHER STUDIES:

## 2025-01-14 NOTE — PROGRESS NOTE ADULT - TIME BILLING
Bedside exam and interview   Reviewed vitals, labs   Discussed patient's plan of care with primary team   Documentation of encounter

## 2025-01-15 LAB
ALBUMIN SERPL ELPH-MCNC: 3.3 G/DL — SIGNIFICANT CHANGE UP (ref 3.3–5)
ALP SERPL-CCNC: 163 U/L — HIGH (ref 40–120)
ALT FLD-CCNC: 34 U/L — SIGNIFICANT CHANGE UP (ref 10–45)
ANION GAP SERPL CALC-SCNC: 12 MMOL/L — SIGNIFICANT CHANGE UP (ref 5–17)
AST SERPL-CCNC: 14 U/L — SIGNIFICANT CHANGE UP (ref 10–40)
BASOPHILS # BLD AUTO: 0.1 K/UL — SIGNIFICANT CHANGE UP (ref 0–0.2)
BASOPHILS NFR BLD AUTO: 1.7 % — SIGNIFICANT CHANGE UP (ref 0–2)
BILIRUB SERPL-MCNC: 0.2 MG/DL — SIGNIFICANT CHANGE UP (ref 0.2–1.2)
BUN SERPL-MCNC: 55 MG/DL — HIGH (ref 7–23)
CALCIUM SERPL-MCNC: 9 MG/DL — SIGNIFICANT CHANGE UP (ref 8.4–10.5)
CHLORIDE SERPL-SCNC: 102 MMOL/L — SIGNIFICANT CHANGE UP (ref 96–108)
CO2 SERPL-SCNC: 23 MMOL/L — SIGNIFICANT CHANGE UP (ref 22–31)
CREAT SERPL-MCNC: 1.51 MG/DL — HIGH (ref 0.5–1.3)
EGFR: 57 ML/MIN/1.73M2 — LOW
EGFR: 57 ML/MIN/1.73M2 — LOW
EOSINOPHIL # BLD AUTO: 0.78 K/UL — HIGH (ref 0–0.5)
EOSINOPHIL NFR BLD AUTO: 13.1 % — HIGH (ref 0–6)
GLUCOSE SERPL-MCNC: 135 MG/DL — HIGH (ref 70–99)
HCT VFR BLD CALC: 30.4 % — LOW (ref 39–50)
HGB BLD-MCNC: 9.6 G/DL — LOW (ref 13–17)
IMM GRANULOCYTES NFR BLD AUTO: 0.3 % — SIGNIFICANT CHANGE UP (ref 0–0.9)
LYMPHOCYTES # BLD AUTO: 1.54 K/UL — SIGNIFICANT CHANGE UP (ref 1–3.3)
LYMPHOCYTES # BLD AUTO: 25.9 % — SIGNIFICANT CHANGE UP (ref 13–44)
MAGNESIUM SERPL-MCNC: 2.1 MG/DL — SIGNIFICANT CHANGE UP (ref 1.6–2.6)
MCHC RBC-ENTMCNC: 30.3 PG — SIGNIFICANT CHANGE UP (ref 27–34)
MCHC RBC-ENTMCNC: 31.6 G/DL — LOW (ref 32–36)
MCV RBC AUTO: 95.9 FL — SIGNIFICANT CHANGE UP (ref 80–100)
MONOCYTES # BLD AUTO: 0.54 K/UL — SIGNIFICANT CHANGE UP (ref 0–0.9)
MONOCYTES NFR BLD AUTO: 9.1 % — SIGNIFICANT CHANGE UP (ref 2–14)
NEUTROPHILS # BLD AUTO: 2.97 K/UL — SIGNIFICANT CHANGE UP (ref 1.8–7.4)
NEUTROPHILS NFR BLD AUTO: 49.9 % — SIGNIFICANT CHANGE UP (ref 43–77)
NRBC # BLD: 0 /100 WBCS — SIGNIFICANT CHANGE UP (ref 0–0)
NRBC BLD-RTO: 0 /100 WBCS — SIGNIFICANT CHANGE UP (ref 0–0)
PHOSPHATE SERPL-MCNC: 3.8 MG/DL — SIGNIFICANT CHANGE UP (ref 2.5–4.5)
PLATELET # BLD AUTO: 211 K/UL — SIGNIFICANT CHANGE UP (ref 150–400)
POTASSIUM SERPL-MCNC: 3.7 MMOL/L — SIGNIFICANT CHANGE UP (ref 3.5–5.3)
POTASSIUM SERPL-SCNC: 3.7 MMOL/L — SIGNIFICANT CHANGE UP (ref 3.5–5.3)
PROT SERPL-MCNC: 6.5 G/DL — SIGNIFICANT CHANGE UP (ref 6–8.3)
RBC # BLD: 3.17 M/UL — LOW (ref 4.2–5.8)
RBC # FLD: 13.8 % — SIGNIFICANT CHANGE UP (ref 10.3–14.5)
SODIUM SERPL-SCNC: 137 MMOL/L — SIGNIFICANT CHANGE UP (ref 135–145)
WBC # BLD: 5.95 K/UL — SIGNIFICANT CHANGE UP (ref 3.8–10.5)
WBC # FLD AUTO: 5.95 K/UL — SIGNIFICANT CHANGE UP (ref 3.8–10.5)

## 2025-01-15 PROCEDURE — 99233 SBSQ HOSP IP/OBS HIGH 50: CPT

## 2025-01-15 PROCEDURE — 99232 SBSQ HOSP IP/OBS MODERATE 35: CPT

## 2025-01-15 PROCEDURE — 76775 US EXAM ABDO BACK WALL LIM: CPT | Mod: 26

## 2025-01-15 RX ADMIN — IPRATROPIUM BROMIDE AND ALBUTEROL SULFATE 3 MILLILITER(S): .5; 2.5 SOLUTION RESPIRATORY (INHALATION) at 05:34

## 2025-01-15 RX ADMIN — HEPARIN SODIUM 5000 UNIT(S): 1000 INJECTION INTRAVENOUS; SUBCUTANEOUS at 05:34

## 2025-01-15 RX ADMIN — Medication 15 MILLILITER(S): at 05:34

## 2025-01-15 RX ADMIN — Medication 1 DROP(S): at 14:02

## 2025-01-15 RX ADMIN — LEVETIRACETAM 1500 MILLIGRAM(S): 10 INJECTION, SOLUTION INTRAVENOUS at 17:47

## 2025-01-15 RX ADMIN — ERYTHROMYCIN 1 APPLICATION(S): 5 OINTMENT OPHTHALMIC at 22:57

## 2025-01-15 RX ADMIN — OFLOXACIN 1 DROP(S): 3 SOLUTION OPHTHALMIC at 05:36

## 2025-01-15 RX ADMIN — IPRATROPIUM BROMIDE AND ALBUTEROL SULFATE 3 MILLILITER(S): .5; 2.5 SOLUTION RESPIRATORY (INHALATION) at 23:05

## 2025-01-15 RX ADMIN — Medication 25 GRAM(S): at 14:02

## 2025-01-15 RX ADMIN — BACLOFEN 5 MILLIGRAM(S): 10 INJECTION INTRATHECAL at 17:48

## 2025-01-15 RX ADMIN — ACETYLCYSTEINE 4 MILLILITER(S): 200 INHALANT RESPIRATORY (INHALATION) at 05:34

## 2025-01-15 RX ADMIN — OFLOXACIN 1 DROP(S): 3 SOLUTION OPHTHALMIC at 23:19

## 2025-01-15 RX ADMIN — Medication 1 APPLICATION(S): at 05:36

## 2025-01-15 RX ADMIN — ACETYLCYSTEINE 4 MILLILITER(S): 200 INHALANT RESPIRATORY (INHALATION) at 11:53

## 2025-01-15 RX ADMIN — ACETYLCYSTEINE 4 MILLILITER(S): 200 INHALANT RESPIRATORY (INHALATION) at 23:05

## 2025-01-15 RX ADMIN — Medication 1 DROP(S): at 17:49

## 2025-01-15 RX ADMIN — FLUTICASONE PROPIONATE 1 SPRAY(S): 50 SPRAY, METERED NASAL at 17:49

## 2025-01-15 RX ADMIN — Medication 25 GRAM(S): at 22:54

## 2025-01-15 RX ADMIN — Medication 1 SPRAY(S): at 05:36

## 2025-01-15 RX ADMIN — Medication 1 SPRAY(S): at 17:50

## 2025-01-15 RX ADMIN — Medication 1 DROP(S): at 10:29

## 2025-01-15 RX ADMIN — BACLOFEN 5 MILLIGRAM(S): 10 INJECTION INTRATHECAL at 05:34

## 2025-01-15 RX ADMIN — AMLODIPINE BESYLATE 5 MILLIGRAM(S): 10 TABLET ORAL at 05:35

## 2025-01-15 RX ADMIN — LEVETIRACETAM 1500 MILLIGRAM(S): 10 INJECTION, SOLUTION INTRAVENOUS at 05:35

## 2025-01-15 RX ADMIN — HEPARIN SODIUM 5000 UNIT(S): 1000 INJECTION INTRAVENOUS; SUBCUTANEOUS at 22:55

## 2025-01-15 RX ADMIN — HEPARIN SODIUM 5000 UNIT(S): 1000 INJECTION INTRAVENOUS; SUBCUTANEOUS at 14:02

## 2025-01-15 RX ADMIN — IPRATROPIUM BROMIDE AND ALBUTEROL SULFATE 3 MILLILITER(S): .5; 2.5 SOLUTION RESPIRATORY (INHALATION) at 17:47

## 2025-01-15 RX ADMIN — Medication 1 DROP(S): at 02:38

## 2025-01-15 RX ADMIN — Medication 1 APPLICATION(S): at 17:51

## 2025-01-15 RX ADMIN — DOXAZOSIN MESYLATE 8 MILLIGRAM(S): 8 TABLET ORAL at 22:56

## 2025-01-15 RX ADMIN — Medication 40 MILLIGRAM(S): at 11:54

## 2025-01-15 RX ADMIN — Medication 1 DROP(S): at 22:57

## 2025-01-15 RX ADMIN — OFLOXACIN 1 DROP(S): 3 SOLUTION OPHTHALMIC at 17:50

## 2025-01-15 RX ADMIN — Medication 100 MILLIGRAM(S): at 05:34

## 2025-01-15 RX ADMIN — FLUTICASONE PROPIONATE 1 SPRAY(S): 50 SPRAY, METERED NASAL at 05:36

## 2025-01-15 RX ADMIN — Medication 100 MILLIGRAM(S): at 12:16

## 2025-01-15 RX ADMIN — ACETYLCYSTEINE 4 MILLILITER(S): 200 INHALANT RESPIRATORY (INHALATION) at 17:48

## 2025-01-15 RX ADMIN — Medication 25 GRAM(S): at 06:36

## 2025-01-15 RX ADMIN — OFLOXACIN 1 DROP(S): 3 SOLUTION OPHTHALMIC at 11:55

## 2025-01-15 RX ADMIN — IPRATROPIUM BROMIDE AND ALBUTEROL SULFATE 3 MILLILITER(S): .5; 2.5 SOLUTION RESPIRATORY (INHALATION) at 11:54

## 2025-01-15 RX ADMIN — Medication 15 MILLILITER(S): at 17:47

## 2025-01-15 RX ADMIN — Medication 1 APPLICATION(S): at 05:55

## 2025-01-15 RX ADMIN — Medication 1 DROP(S): at 05:35

## 2025-01-15 NOTE — PROGRESS NOTE ADULT - ASSESSMENT
44M with unclear PMH (?stroke, MI) who was found down at work, intubated for airway protection and found to have acute parenchymal hemorrhage within nabil with mass effect (+ acute/subacute right cerebellar infarct) in setting of hypertensive emergency, transferred to [facility] for further neurosurgical care. Hospital course c/b poor neurologic recovery s/p trach-PEG, AUR s/p gabriel, ANKIA on CKD c/b hyperkalemia, HAP s/p amp-sulbactam (EOT 10/23), K aerogenes bacteremia  treated with 2 weeks of Cefepime per ID , On 11/15 he became septic and was transferred to NSICU due to increased o2 requirements and needed Vent support , Trach Cultures + for Stenotrophomonas which was treated with 7 days of Bactrim per ID , has been weaned of Vent since 11/23 and is now on 10lts at 40% o2 through Trach Collar. Stepped up to the NSICU on 12/15 for hypoxia and tachycardia. PE ruled out. On abx for 5 days. Unclear etiology. Now stepped down to 8Lach.    # Pontine hemorrhage  # Encephalopathy due to Intracranial Bleed   - Acute parenchymal hemorrhage within nabil with mass effect (+ acute/subacute right cerebellar infarct) in setting of hypertensive emergency.   - MRI brain also demonstrated acute/subacute R cerebellar stroke.   - No Neurosurgical Interventions were performed   - Secondary to IPH and cerebellar stroke patient has become Trach and Peg Dependent    #Fever   -Started on Pip/Tazo, leucocytosis resolved. Will continue on same in view of resolution of leucocytosis, follow-up Bcx  -If persistent fever get repeat RVP  -Would stop antibiotics on 1/18   -Treatment for VAP    #Acute kidney injury  # Acute Tubular Necrosis superimposed on CKD stage III  - s/p US renal with chronic medical renal disease  - Increase Free water to 200 q4h. Monitor UOP, bladder scan per protocol. Further management based on creatinine trend.    # Seizures  - Continue Seizure precautions   - Continue  Keppra and Fosphenytoin     # Hypertension  - Continue amlodipine 5mg daily with holding parameters  - Will continue to monitor BP and titrate antihypertensive as required     # Chronic respiratory failure.   - S/p percutaneous trach by pulm on 10/14/24  - Currently on Vent Support   - Continue Chest PT    # Neurogenic dysphagia.   - Pt s/p PEG with surgery 10/21/24  - TF per nutrition  - aspiration precautions and elevation of HOB.    #Acute urinary retention.   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

## 2025-01-15 NOTE — PROGRESS NOTE ADULT - SUBJECTIVE AND OBJECTIVE BOX
HPI:  Unknown age male, unknown past medical history (reported stroke and MI by coworkers) presented to [facility] [place] with AMS, Pt was working at [employer] when he was found down by coworkers. EMS called and pt brought to [facility] [place] ED. Intubated, sedated, started on cardene for SBPs in 200s. CT head showed brain stem bleed. Transferred to NSICU for further management.  (30 Sep 2024 12:55)    OVERNIGHT EVENTS: GEORGE    Hospital Course:   9/30: transferred from [facility] [place]. A line placed. Versed dc'd. Cy Rader at bedside, states pt has family in [place], cannot confirm medications or PMH other than stroke and MI. 250cc bolus 3% given. LR switched to NS. hydralazine 25q8 started, 3% started, switched propofol to precedex   10/1: stability CTH done. Added labetalol, started TF. Palliative consulted. ethics consulted to determine surrogate. febrile 103, pan cx sent  10/2: BD 2, GEORGE overnight. TF resumed. Desatt'd to 80s, FiO2 inc. to 50. Fentanyl given, ABG, CXR ordered. Maxxed on precedex, started on propofol for DARIEN -4 - -5. Precedex dc'd. Duonebs, mucomyst, hypertonic added. 3% dc'd. Cardene dc'd. Start vanc/CTX. Increased labetalol 200q8. MRSA negative, dc'd vanc. ETT pulled back 2cm x 2, good positioning after confirmatory chest xray. Ethics attempting to establish HCP with family. Na 159, starting FW 250q6 for range 150-155.   10/3: BD3, GEORGE o/n, neuro stable. Na elevating, FW increased to 300q6. Dc'd bowel reg for diarrhea. vEEG started. SQH 5000q8 tonight.   10/4: BD 4, albumn bolus, incr. LR to 80 2/2 incr. in Cr, LR to 10 0cc/hr for uptrending Cr. Started 7% hypersal for 48hrs and SL atropine for inline/oral thick secretions. Dc'd CTX and started ancef for MSSA in the sputum. Nephrology consulted for CKD, f/u recs. SBP 170s, given hydralazine 10mg IVP.   10/5: BD5, o/n 10mg IVP hydralazine given for SBP 170s and started on hydralazine 25q8 via OGT. 10mg IV push labetalol for SBP > 160s. RT placed for diarrhea.   10/6: BD6, o/n FW increased to 350q4 per nephrology recs. IV tylenol for temp 100.6, SBp 160s presumed uncomfortable.   10/7: BD7, overnight pancultured for temp 101.8F.   10/8: BD8. GEORGE. Cr bumped. decreased LR to 75cc/hr. Adding simethicone ATC. incr hydralazine 50mgTID. Incr labetalol 300mgTID. Na 145, decreased FWF to 250q6. Start precedex. FENa consistent with intrinsic kidney injury. Pend repeat renal US. Retaining up to 1.3L, bladder scans q6, straight cath PRN  10/9: BD 9. GEORGE overnight. Neuro stable. abd xray for distention w non-specific gas pattern, OGT to LIWS for morning. duonebs/mucomyst to q8 for improving secretions. Changed tube feeds to Jevity 1.5 20cc/hr, low rate due to abdominal distention, nepro dense and more difficult to digest. Tolerating CPAP, confirmed by ABG.   10/10: BD 10. GEORGE overnight. Neuro stable. (+) gabriel for urinary retention on bladder scan. inc TF to goal rate of 40cc/hr. family leaning toward pursuing trach/PEG. 1/2 amp for FS 81.   10/11: BD 11. GEORGE overnight. Neuro stable. Trach/PEG consults placed.   10/12: BD 12. GEORGE overnight. Neuro stable. MRI brain complete.   10/13: BD 13. Increase flomax. Hold SQH after PM dose for trach tm. IVL.   10/14: BD 14. GEORGE overnight, remains on AC/VC. Gabriel placed for urinary retention. Dc'd free water.  S/p trach with pulm. NGT placed and CXR confirmed in good position.   10/15: BD 15, GEORGE ovn. resumed feeds. spiked 101, pan cx sent.   10/16: BD 16. GEORGE ovn. Lokelma 5mg for K+ 5.4. Started vanc q 24/zosyn for empiric PNA coverage, IVF to 100/hr. PEG held for fever.   10/17: BD 17,  ordered serum osm and urine osm for am. Started sinemet for neurostimulation. Increased cardura to 0.8. Started FW 100q4, dc'd IVF. MRSA negative, dc'd vanc. NGT replaced d/t coiling.   10/18: BD 18, GEORGE overnight, neuro stable. Amantadine added for neurostim. zosyn changed to unasyn for acinetobacter baumannii, failed TOV and required SC  10/19: BD 19, GEORGE ovn. cardura 2mg added for retention. labetalol decreased 200q8, hydralazine decreased 25q8. Gabriel replaced.   10/20: BD20, GEORGE overnight. NGT dislodged, replaced. PEG tomorrow w/ gen surg, FW increased to 150q4 and labetalol decreased to 100q8, lokelma given for hyperkalemia.   10/21: BD 21. POD0 PEG placement with Gen surg. decr labetolol to 50q8, incr. cardura to 0.4, started lokelma and phoslo, dc gabriel POD0 PEG placement with Gen surg.  10/22: BD 22. Plan to start TF today via PEG. dc labetalol, Following ophtho recs. Increased apnea settings - found to be in cheyne-moe respiration. CPAP 5/5.  10/23: BD 23. hydralazine d/c'd, trach collar trial today. Rectal tube placed at 6am.  10/24: BD24, o/n lokelma held due to diarrhea. Free water 100q6 resumed. dc'd tamsulosin, amantadine. Incr'd cardura to 8mg qhs. Dc'd FW. Switched jevity to nepro. garbiel placed for high urine output. Started SL atropine for oral secretions. Dc'd free water.  10/25: BD25, o/n decreased suctioning requirements to > q4hrs, GEORGE. Cr improving, cont phoslo, lokelma held at this time. Gabriel placed yest, cont. Tolerating trach collar. Given 500cc plasmalyte bolus for ANIKA. Dc'd sinemet.   10/26: BD26, o/n resumed lokelma 5mg daily and resumed 100cc free water q6hrs. Change in neuro status with new right pupillary dilation with anisocoria (right pupil 6mm fixed and left pupil 3mm briskly reactive). Given 23.4% NaCl bullet, taken for emergent CTH showing mostly resolved pontine hemorrhage, continued brainstem hypodensity likely edema d/t hemorrhage, no new hemorrhage or infarct, no herniation, mild increase in size of left lateral ventricle. Vitals remaine stable. Na goal > 140.   10/27: BD27, o/n GEORGE.Neuro stable. Pend stepdown with airway bed.   10/28: BD 28. GEORGE overnight. Neuro stable. Miralax ordered. Gabriel removed, pending TOV.  10/29: BD 29. GEORGE o/n. Given 2L NS over 8 hrs for increased BUN/Cr ratio. Gabriel placed for frequent straight cath.   10/30: BD 30.   10/31: BD 31. GEORGE overnight. Na 149, increased free water to 200q6. 1L NS for uptrending BUN.   11/1: BD 32. GEORGE overnight. Given 1L NS for dehydration. Na 146, increased FW to 250q6.   11/2: BD 33 GEORGE overnight, neuro stable, given 500cc bolus for net negative status and tachycardia   11/3: BD 34, GEORGE overnight, neuro stable. Patient remains tachycardic, EKG showing sinus tachycardia, given additional 500cc NS bolus. Febrile to 101.9F, pan cultured (without UA), CXR WNL, given tylenol.   11/4: BD 35, GEORGE overnight, neuro stable. Given 1L NS for tachycardia. sputum (+) for stenotropohomas maltophilia.   11/5: BD 36 GEORGE overnight, neuro stable. Vancomycin dc'd. Chest PT BID. ID consulted, cont zosyn.  11/6: BD 37. blood cx + klebsiella dc zosyn changed to cefepime, CTAP ordered, rpt blood cx sent.    11/7: BD 38. Pending CT A/P, given 250cc bolus and starting maintenance fluids overnight. Pending CT A/P after bolus   11/8: BD 39. CT CAP negative for infection.   11/9: BD 40. GEORGE overnight.  11/10: BD 41. GEORGE overnight. desat to 85 on trach collar, O2 inc to 10L and 100%, O2 sat inc to 95. pt tachy to 110s, euvolemic. given tylenol. ABG and CXR ordered. spiked fever, pancultured, RVP negative. AM ABG w pO2 79, rpt w pO2 79. pt appears comfortable, satting 94%.   11/11: BD 42. GEORGE overnight. pt became tachy to 130s, desat to 90 on 100% FiO2 and 10L. suctioned, (+) productive cough. temp 101.4, given 1g IV tylenol and 500cc NS bolus for euvolemia. fever and HR downtrending. LE dopplers negative for dvt  11/12: BD 43, GEOGRE ovn, fever and HR downtrending, satting 97% 70% FIO2  11/13: BD 44, GEORGE ovn. started standing tylenol x24 hours for tachycardia. desat to 80s, o2 increased. CXR stable, pending CTA PE protocol.   11/14: BD 45, GEORGE overnight, neuro stable. resp therapy dec FiO2 to 70%.   11/15: BD 46, GEORGE overnight, neuro stable.  Rapid called for desaturation 30s, tachycardic 140s. Patient bagged, 100% fio2, heavily suctioned. CXR/POCUS unremarkable. ABG c/w desaturation. WBC 14.71. Afebrile. O2 improved to 90s and patient upgraded to ICU. ABG paO2 30s improved to 89 on vent. IV Tylenol x 1, sputum sent. Start protonix while o-n vent.   11/16: BD 47. POCUS showed collapsable IVCF, given 1L bolus. Vanco/Cefpime added empriic for PNA, NGT feeds restarted. MRSA swab neg, Vanc DC'd.   11/17: BD 48. GEORGE overnight. 1l bolus for tachycardia. Spiked to 101, cultured. 500cc bolus for tachycardia, tachy to 148 given 25mcg fentanyl, 250cc albumin, 1.5L bolus. 5 IV lopressor with response HR to 100s. +Stenotrophomonas on sputum cx.   11/18: BD 49. GEORGE overnight. Consulted ID, cefepime switched to bactrim x7days. Started hydrochlorothiazine 12.5mg daily.  11/19: BD 50. Tachy 120s, given tylenol and 500cc NS. Tolerating 5/5, switched to TCx. Holding phos binder. D/c Bactrim. D/c gabriel, f/u TOV. Dc'd PPI.   11/20: BD 51. GEORGE ovn. 1600 satting low 90s, mildly tachy to 110s, afebrile, RR wnl. O2 improved to mid 90s while inc O2 to 100% on TCx. CPAP 5/5 placed back on.  11/21: BD 52, GEORGE ovn, tolerating CPAP 5/5. Switch to trach collar during the day if tolerating well. HCTZ held for Cr bump, straight cath frequence increased to q4  11/22: BD 53, GEORGE ovn. Resumed phoslo. Gabriel placed. Resumed HCTZ.   11/23: BD 54. Holding tylenol in setting of possible fever, will require pan cx if febrile. Cr improved today. Cont CPAP. Bowel regimen held i/s/o diarrhea. FOBT negative.  11/24: BD 55. GEORGE overnight. Neuro stable. HCTZ dc'ed, started lisinopril 5. Lokelma dc'ed for K 3.7.   11/25: BD 56, GEORGE overnight. Neuro stable. dc'd lisinopril 5mg. Gabriel dc'd. TOV. 1545 noted to be hypotensive, MAP 50, in supine position on chair, HR 60s, afebrile, O2 96%. Given 1L cc NS bolus, placed back on bed in reverse trendelenberg, improved to map of 66. Neostick at bedside. Vitals check q1h. Dc'ed amlodipine. Failed TOV, bladder scan q6, sc prn. Added back Senna.   11/26: BD 57, GEORGE overnight. Neuro stable. Dc'd phoslo.   11/27: BD 58, GEORGE overnight. Neuro stable.    11/28: BD 59. Gabriel replaced.   11/29: GEORGE.  11/30: GEORGE, neuro stable.   12/1: BD 62, GEORGE overnight  12/2: BD 63, GEORGE overnight.; Given 1L bolus of LR for uptrending BUN/Cr.  12/3: BD 64. Reinstated eye gtt/moisture chamber given increased Rt eye injection  12/4: BD 65. GEORGE overnight. Attempted to speak with ophtho regarding eyelid weight/closure but no answer, full mailbox.   12/5: BD 66, GEORGE overnight, bowel regimen increased and had BM.   12/6: BD 67, GEORGE overnight, neuro stable.  12/7: GEORGE overnight, neuro stable. /110s, given x1 hydralazine 10 mg IVP. Restarted home amlodipine 5mg.  12/8: GEORGE. OOB to chair.     12/11: GEORGE, mucomyst added for thick secretions, simethicone for abd distension, abd xray with stool burden, increased bowel regimen.   12/12: GEORGE overnight.   12/13: GEORGE overnight.   12/14: GEORGE overnight  12/15: o/n Patient became tachycardic HR 120s and 10 minutes later O2 sat dropped as low as 89%. Patient suctioned without improvement in O2 sat and tube feeds found in suction catheter. TFs held.  STAT CXR ordered. STAT labs sent. Respiratory therapist called to bedside and patient trach connected to ventilator. After connection to ventilator and further suctioning O2 sat improved to 97% but patient HR remains 120-130s. Upgraded to NSICU for further management. Vancomycin and zosyn started. CTA  chest PE protocol and CTH ordered. Blood cultures sent.given 500cc bolus, rpt ABG sent pO2 243, CTH and CTA chest done. FS while NPO, FiO2 dec 50 pending ABG. sputum cx positive for few GPC and GNR.   12/16: GEORGE overnight, restarted amlodipine, troponin 75   12/17: GEORGE overnight, neuro stable. Attempted CPAP this morning, did not tolerate and back on full vent support. Dc'd Vanc. Tachycardia to 140s, noted extremities to be twitching along with jaw twitching. Given 2g of Keppra, total 4mg ativan and placed on EEG, full set of labs and lactate negative. Resumed trickle feeds at 20cc/hr. Given 250cc albumin for tachycardia.   12/18: GEORGE overnight. Neuro stable. CTH stable. EEG negative and dc'd.   12/19: GEORGE overnight. neuro stable. SIMV most of day, AC/VC at night.   12/20: GEORGE overnight, neuro stable. remains on VC/AC  12/21: GEORGE ovn. 1L bolus for tachy to 120s-130s.   12/22: GEORGE ovn. Tachy to 120s, given tylenol and IVF. 2mg IV ativan given for L jaw twitching. Sx resolved for 3 minutes and started again. Epilepsy contacted. Given 2mg IV ativan. Continued twitching. Increased keppra to 1500mg BID, 2mg ativan given. Propranolol started for refractory tachycardia. vEEG ordered. albumin given. POCUS performed, no b lines. Started on fosphenytoin, loaded w 20mg/kg then 100mg TID. febrile, pancx, started cefepime 2g q8, vancomycin  12/23: GEORGE ovn. +focal motor seizures on eeg. ID consulted. Vancomycin dc'ed as per ID.  12/24: GEORGE ovn, neuro stable. Baclofen 5 mg q12 started for hiccups. Na 129 from 134. Urine lytes c/w SIADH. Given 250cc 3% bolus. CT chest for infection w/u - f/u read. Repeat Na 136. UA negative  12/25: GEORGE ovn.   12/26: GEORGE ovn  12/27: GEORGE overnight. Blood cx neg @ 4 days, DC cefepime per medicine (sputum colonized).   12/28: Desaturation o/n to 80's, CXR obtained, pulse ox changed and sats resolved. Na 133 from 138, 250cc 3% bolus given. Cefepime resumed until 12/30 per ID. Rpt Na 138.  12/29: GEORGE overnight. Na stable.   12/30: GEORGE overnight. Family meeting with son, pt now DNR.   12/31: GEORGE overnight.   1/1: GEORGE overnight, neuro stable.  1/2: GEORGE overnight, neuro stable. FW decreased to 100q6.   1/3: GEORGE overnight, neuro stable. fosphenytoin IV changed to pheytoin PO via PEG per epilepsy recommendations  1/4: GEORGE overnight, neuro stable. FW incr. to 100q4  1/5: GEORGE overnight, neuro stable.   1/6: GEORGE overnight, neuro stable   1/7: GEORGE overnight, neuro stable. BUN/Cr increased, increased FW to 200q6. Given 1L bolus of LR over 2 hours. Gabriel d/c'd, voiding, continue bladder scan q6.   1/8: GEORGE overnight, neuro stable. BUN/Cr improving.  1/9: GEORGE overnight, neuro stable.   1/10: GEORGE overnight, neuro stable.   1/11: GEORGE overnight, neuro stable, vent settings stable.   1/12: GEORGE overnight, neuro stable. Febrile to 102F, f/u pan cx, chest xray, given tylenol. Zosyn started.   1/13: GEORGE overnight, neuro stable, cont Zosyn for presumed PNA, prelim sputum cx growing; few GS neg diplococci, mod GS neg rods, rare GS + rods, fever trend improved. Free water increased to [id number].   1/14: GEORGE overnight. pending renal US for elevated Cr  1/15: GEORGE ovn    Vital Signs Last 24 Hrs  T(C): 36.8 (14 Jan 2025 22:05), Max: 38.2 (14 Jan 2025 00:03)  T(F): 98.2 (14 Jan 2025 22:05), Max: 100.7 (14 Jan 2025 00:03)  HR: 62 (14 Jan 2025 21:53) (54 - 88)  BP: 120/83 (14 Jan 2025 20:20) (96/61 - 133/93)  BP(mean): 98 (14 Jan 2025 20:20) (74 - 108)  RR: 14 (14 Jan 2025 21:53) (14 - 24)  SpO2: 98% (14 Jan 2025 21:53) (97% - 100%)    Parameters below as of 14 Jan 2025 21:53  Patient On (Oxygen Delivery Method): ventilator    O2 Concentration (%): 40    I&O's Summary    13 Jan 2025 07:01  -  14 Jan 2025 07:00  --------------------------------------------------------  IN: 1640 mL / OUT: 1105 mL / NET: 535 mL    14 Jan 2025 07:01  -  15 Jan 2025 00:02  --------------------------------------------------------  IN: 1861 mL / OUT: 595 mL / NET: 1266 mL        PHYSICAL EXAM:  Constitutional: NAD, resting comfortably in bed.   HEENT: Pupils equal, round, reactive to light. EOMI. Face symmetric, tongue midline.  Respiratory: +trach. No respiratory distress, lungs clear to auscultation bilaterally.   Cardiovascular: RRR, S1, S2.   Gastrointestinal: +PEG. Abdomen soft, non tender, nondistended.  Neurological:  AAOX1. Opens eyes. Not following commands  Motor: RUE trace w/d, RLE w/d, DARRELL and LL 0/5.  Extremities: Warm, well perfused.    TUBES/LINES:  [] Gabriel  [] Lumbar Drain  [] Wound Drains  [] Others      DIET:  [] NPO  [] Mechanical  [x] Tube feeds    LABS:                        9.8    8.87  )-----------( 221      ( 14 Jan 2025 05:30 )             30.5     01-14    138  |  100  |  64[H]  ----------------------------<  102[H]  3.7   |  24  |  1.57[H]    Ca    9.0      14 Jan 2025 05:30  Phos  3.8     01-14  Mg     2.4     01-14        Urinalysis Basic - ( 14 Jan 2025 05:30 )    Color: x / Appearance: x / SG: x / pH: x  Gluc: 102 mg/dL / Ketone: x  / Bili: x / Urobili: x   Blood: x / Protein: x / Nitrite: x   Leuk Esterase: x / RBC: x / WBC x   Sq Epi: x / Non Sq Epi: x / Bacteria: x          CAPILLARY BLOOD GLUCOSE          Drug Levels: [] N/A    CSF Analysis: [] N/A      Allergies    Allergy Status Unknown    Intolerances      MEDICATIONS:  Antibiotics:  piperacillin/tazobactam IVPB.. 3.375 Gram(s) IV Intermittent every 8 hours    Neuro:  acetaminophen   Oral Liquid .. 650 milliGRAM(s) Oral every 6 hours PRN  baclofen 5 milliGRAM(s) Oral every 12 hours  levETIRAcetam 1500 milliGRAM(s) Oral two times a day  phenytoin   Suspension 100 milliGRAM(s) Oral every 8 hours    Anticoagulation:  heparin   Injectable 5000 Unit(s) SubCutaneous every 8 hours    OTHER:  acetylcysteine 10%  Inhalation 4 milliLiter(s) Inhalation every 6 hours  albuterol/ipratropium for Nebulization 3 milliLiter(s) Nebulizer every 6 hours  amLODIPine   Tablet 5 milliGRAM(s) Oral daily  artificial tears (preservative free) Ophthalmic Solution 1 Drop(s) Right EYE every 4 hours  chlorhexidine 0.12% Liquid 15 milliLiter(s) Oral Mucosa every 12 hours  chlorhexidine 2% Cloths 1 Application(s) Topical <User Schedule>  doxazosin 8 milliGRAM(s) Oral at bedtime  erythromycin   Ointment 1 Application(s) Right EYE at bedtime  fluticasone propionate 50 MICROgram(s)/spray Nasal Spray 1 Spray(s) Both Nostrils two times a day  influenza   Vaccine 0.5 milliLiter(s) IntraMuscular once  ofloxacin 0.3% Solution 1 Drop(s) Right EYE four times a day  pantoprazole  Injectable 40 milliGRAM(s) IV Push daily  petrolatum Ophthalmic Ointment 1 Application(s) Both EYES two times a day  propranolol 10 milliGRAM(s) Oral every 8 hours  sodium chloride 0.65% Nasal 1 Spray(s) Both Nostrils two times a day    IVF:    CULTURES:  Culture Results:   >=3 organisms. Probable collection contamination. (01-12 @ 15:25)  Culture Results:   No growth at 48 Hours (01-12 @ 12:50)    RADIOLOGY & ADDITIONAL TESTS:      ASSESSMENT:  44M PMH ?stroke/MI present to [facility] [place] after collapsing at work. Decorticate posturing, vomiting, intubated for airway protection. 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 Transferred to [facility] for further management. s/p trach 10/14. s/p peg 10/21. Re-upgrade to ICU 2/2 desaturation event and suctioning requirements 11/15. Re-upgrade to NSICU 12/15 2/2 desaturation and tachycardia.    AMS    Intubate    Handoff    MEWS Score    Acute myocardial infarction    CVA (cerebral vascular accident)    Intracerebral hemorrhage of brain stem    Brainstem stroke    Brain stem stroke syndrome    Brain stem hemorrhage    Brain stem stroke syndrome    Hemorrhagic stroke    Brainstem stroke    Encephalopathy acute    Functional quadriplegia    Advanced care planning/counseling discussion    Encounter for palliative care    Pontine hemorrhage    Neurogenic dysphagia    Chronic respiratory failure    Acute kidney injury superimposed on CKD    Acute urinary retention    Hypertensive emergency    Sepsis, unspecified organism    Sepsis    Gram-negative bacteremia    Dyspnea    Percutaneous tracheal puncture    Altered mental status examination    EGD, with PEG    AMS    Room Service Assist    [email or web address]        PLAN:  Neuro:  - neuro/vitals q4h  - pain control: tylenol prn  - seizure tx: keppra 1500mg BID, phenytoin 100mg PO TID  - vEEG (10/3-4) negative, (10/17-10/19) negative, (12/17-12/18) negative, (12/22-12/25 ) +focal motor seizures not correlating w/ EEG  - CTH 9/30: enlarged pontine hemorrhage, CTH 10/3: stable, CTH 10/25: mostly resolved pontine hemorrhage, CTH 12/15: R mastoid air cell opacification; acute otitis media vs sterile effusion, CTH 12/18: stable.  - MRI brain 10/12: parenchymal hemorrhage, acute/subacute R cerebellar stroke      CV:  - -160  - tachycardia: propranolol 10mg q8  - HTN: amlodipine 5mg  - echo (9/30) EF 75%, repeat 12/16: 57%    Resp:  - trach to vent, AC/VC 40/400/14/6  - Secretions: duonebs/mucomyst/chest PT q6h     GI:  - TF via PEG (placed 10/21 by gen surg)  - bowel regimen held for loose stool, last BM 1/13  - baclofen 5q12 for hiccups     Renal:  - IVL, FW 200q4 for hydration   - Urinary retenion: Gabriel removed 1/7, passed TOV  - CKD: trend BUN/Cr  - renal US 10/1: echogenicity c/w chronic med renal dz, repeat 10/8: inc renal echogeicity, c/f medical renal disease w increased hydro, repeat pending    Endo:  - A1c 5.4    Heme:  - DVT ppx: SCDs, SQH 5000u q8h   - LE dopplers negative 12/16    ID:  - last pan cx 1/12, zosyn (1/12 - )  - empiric PNA: zosyn (1/12- ), cefepime (12/22-12/30), s/p vanc (12/22-12/23), s/p zosyn (12/15-12/20), s/p vanc (12/15-12/16)  - s/p Stenotrophomonas maltophilia PNA: s/p Bactrim (11/18-11/19) s/p Cefepime (11/16-11/18)  - 11/3, (+) sputum for stenotrophomas maltophlia, blood cx (+) klebsiella, cefepime 2gq12 (11/6 - 11/12)   - empiric tx: s/p zosyn (11/3-11/6) , s/p vanc  (11/3-11/5)  - S/p Ancef (10/4-10/14) for PNA, and s/p Unasyn (10/18-10/23) +actinobacter baumanii     MISC:  - ophtho consult for keratitis  - erythromycin ointment R eye q4hrs, ofloxacin ointment R eye QID, artificial tears R eye q2hrs, moisture chamber at bedtime    Dispo: SDU status, DNR, pending PRUCOL for benefits     D/w Dr. D'Amico

## 2025-01-15 NOTE — PROGRESS NOTE ADULT - SUBJECTIVE AND OBJECTIVE BOX
O/N Events: GEORGE  Subjective/ROS: No complaints. Denies HA, CP, SOB, n/v, changes in bowel/urinary habits.  12pt ROS otherwise negative.    VITALS  Vital Signs Last 24 Hrs  T(C): 36.3 (15 Sourav 2025 09:06), Max: 36.8 (14 Jan 2025 22:05)  T(F): 97.3 (15 Sourav 2025 09:06), Max: 98.2 (14 Jan 2025 22:05)  HR: 56 (15 Sourav 2025 08:22) (54 - 64)  BP: 112/78 (15 Sourav 2025 08:22) (109/73 - 133/93)  BP(mean): 91 (15 Sourav 2025 08:22) (87 - 108)  RR: 18 (15 Sourav 2025 08:22) (14 - 20)  SpO2: 100% (15 Sourav 2025 08:22) (97% - 100%)    Parameters below as of 15 Sourav 2025 08:22  Patient On (Oxygen Delivery Method): ventilator    O2 Concentration (%): 40    I&O's Summary    14 Jan 2025 07:01  -  15 Sourav 2025 07:00  --------------------------------------------------------  IN: 2371 mL / OUT: 1395 mL / NET: 976 mL    15 Sourav 2025 07:01  -  15 Sourav 2025 09:24  --------------------------------------------------------  IN: 0 mL / OUT: 300 mL / NET: -300 mL    CAPILLARY BLOOD GLUCOSE    PHYSICAL EXAM  General: unresponsive on ventilator, NAD, no labored breathing   HEENT: trach collar in place, clean  Lungs: anterior exam, limited, no crackles, no wheezes  Heart: regular  Abdomen: soft, no distension, + BS  Extremities:  warm, trace edema, not following commands     MEDICATIONS  (STANDING):  acetylcysteine 10%  Inhalation 4 milliLiter(s) Inhalation every 6 hours  albuterol/ipratropium for Nebulization 3 milliLiter(s) Nebulizer every 6 hours  amLODIPine   Tablet 5 milliGRAM(s) Oral daily  artificial tears (preservative free) Ophthalmic Solution 1 Drop(s) Right EYE every 4 hours  baclofen 5 milliGRAM(s) Oral every 12 hours  chlorhexidine 0.12% Liquid 15 milliLiter(s) Oral Mucosa every 12 hours  chlorhexidine 2% Cloths 1 Application(s) Topical <User Schedule>  doxazosin 8 milliGRAM(s) Oral at bedtime  erythromycin   Ointment 1 Application(s) Right EYE at bedtime  fluticasone propionate 50 MICROgram(s)/spray Nasal Spray 1 Spray(s) Both Nostrils two times a day  heparin   Injectable 5000 Unit(s) SubCutaneous every 8 hours  influenza   Vaccine 0.5 milliLiter(s) IntraMuscular once  levETIRAcetam 1500 milliGRAM(s) Oral two times a day  ofloxacin 0.3% Solution 1 Drop(s) Right EYE four times a day  pantoprazole  Injectable 40 milliGRAM(s) IV Push daily  petrolatum Ophthalmic Ointment 1 Application(s) Both EYES two times a day  phenytoin   Suspension 100 milliGRAM(s) Oral every 8 hours  piperacillin/tazobactam IVPB.. 3.375 Gram(s) IV Intermittent every 8 hours  propranolol 10 milliGRAM(s) Oral every 8 hours  sodium chloride 0.65% Nasal 1 Spray(s) Both Nostrils two times a day    MEDICATIONS  (PRN):  acetaminophen   Oral Liquid .. 650 milliGRAM(s) Oral every 6 hours PRN Temp greater or equal to 38.5C (101.3F), Mild Pain (1 - 3)      Allergy Status Unknown      LABS                        9.6    5.95  )-----------( 211      ( 15 Sourav 2025 05:30 )             30.4     01-15    137  |  102  |  55[H]  ----------------------------<  135[H]  3.7   |  23  |  1.51[H]    Ca    9.0      15 Sourav 2025 05:30  Phos  3.8     01-15  Mg     2.1     01-15    TPro  6.5  /  Alb  3.3  /  TBili  0.2  /  DBili  x   /  AST  14  /  ALT  34  /  AlkPhos  163[H]  01-15      Urinalysis Basic - ( 15 Sourav 2025 05:30 )    Color: x / Appearance: x / SG: x / pH: x  Gluc: 135 mg/dL / Ketone: x  / Bili: x / Urobili: x   Blood: x / Protein: x / Nitrite: x   Leuk Esterase: x / RBC: x / WBC x   Sq Epi: x / Non Sq Epi: x / Bacteria: x    IMAGING/EKG/ETC: reviewed

## 2025-01-16 LAB
ANION GAP SERPL CALC-SCNC: 12 MMOL/L — SIGNIFICANT CHANGE UP (ref 5–17)
BUN SERPL-MCNC: 51 MG/DL — HIGH (ref 7–23)
CALCIUM SERPL-MCNC: 9.1 MG/DL — SIGNIFICANT CHANGE UP (ref 8.4–10.5)
CHLORIDE SERPL-SCNC: 104 MMOL/L — SIGNIFICANT CHANGE UP (ref 96–108)
CO2 SERPL-SCNC: 25 MMOL/L — SIGNIFICANT CHANGE UP (ref 22–31)
CREAT SERPL-MCNC: 1.3 MG/DL — SIGNIFICANT CHANGE UP (ref 0.5–1.3)
EGFR: 69 ML/MIN/1.73M2 — SIGNIFICANT CHANGE UP
EGFR: 69 ML/MIN/1.73M2 — SIGNIFICANT CHANGE UP
GLUCOSE SERPL-MCNC: 111 MG/DL — HIGH (ref 70–99)
HCT VFR BLD CALC: 30.1 % — LOW (ref 39–50)
HGB BLD-MCNC: 10 G/DL — LOW (ref 13–17)
MAGNESIUM SERPL-MCNC: 2.1 MG/DL — SIGNIFICANT CHANGE UP (ref 1.6–2.6)
MCHC RBC-ENTMCNC: 31.1 PG — SIGNIFICANT CHANGE UP (ref 27–34)
MCHC RBC-ENTMCNC: 33.2 G/DL — SIGNIFICANT CHANGE UP (ref 32–36)
MCV RBC AUTO: 93.5 FL — SIGNIFICANT CHANGE UP (ref 80–100)
NRBC # BLD: 0 /100 WBCS — SIGNIFICANT CHANGE UP (ref 0–0)
NRBC BLD-RTO: 0 /100 WBCS — SIGNIFICANT CHANGE UP (ref 0–0)
PHOSPHATE SERPL-MCNC: 3.6 MG/DL — SIGNIFICANT CHANGE UP (ref 2.5–4.5)
PLATELET # BLD AUTO: 208 K/UL — SIGNIFICANT CHANGE UP (ref 150–400)
POTASSIUM SERPL-MCNC: 3.5 MMOL/L — SIGNIFICANT CHANGE UP (ref 3.5–5.3)
POTASSIUM SERPL-SCNC: 3.5 MMOL/L — SIGNIFICANT CHANGE UP (ref 3.5–5.3)
RBC # BLD: 3.22 M/UL — LOW (ref 4.2–5.8)
RBC # FLD: 13.7 % — SIGNIFICANT CHANGE UP (ref 10.3–14.5)
SODIUM SERPL-SCNC: 141 MMOL/L — SIGNIFICANT CHANGE UP (ref 135–145)
WBC # BLD: 4.99 K/UL — SIGNIFICANT CHANGE UP (ref 3.8–10.5)
WBC # FLD AUTO: 4.99 K/UL — SIGNIFICANT CHANGE UP (ref 3.8–10.5)

## 2025-01-16 PROCEDURE — 99233 SBSQ HOSP IP/OBS HIGH 50: CPT

## 2025-01-16 PROCEDURE — 99231 SBSQ HOSP IP/OBS SF/LOW 25: CPT

## 2025-01-16 RX ADMIN — Medication 1 DROP(S): at 23:07

## 2025-01-16 RX ADMIN — Medication 25 GRAM(S): at 06:45

## 2025-01-16 RX ADMIN — FLUTICASONE PROPIONATE 1 SPRAY(S): 50 SPRAY, METERED NASAL at 06:06

## 2025-01-16 RX ADMIN — Medication 1 DROP(S): at 09:37

## 2025-01-16 RX ADMIN — BACLOFEN 5 MILLIGRAM(S): 10 INJECTION INTRATHECAL at 05:50

## 2025-01-16 RX ADMIN — FLUTICASONE PROPIONATE 1 SPRAY(S): 50 SPRAY, METERED NASAL at 17:09

## 2025-01-16 RX ADMIN — OFLOXACIN 1 DROP(S): 3 SOLUTION OPHTHALMIC at 17:08

## 2025-01-16 RX ADMIN — IPRATROPIUM BROMIDE AND ALBUTEROL SULFATE 3 MILLILITER(S): .5; 2.5 SOLUTION RESPIRATORY (INHALATION) at 05:50

## 2025-01-16 RX ADMIN — ERYTHROMYCIN 1 APPLICATION(S): 5 OINTMENT OPHTHALMIC at 23:04

## 2025-01-16 RX ADMIN — Medication 25 GRAM(S): at 14:28

## 2025-01-16 RX ADMIN — Medication 40 MILLIEQUIVALENT(S): at 09:36

## 2025-01-16 RX ADMIN — HEPARIN SODIUM 5000 UNIT(S): 1000 INJECTION INTRAVENOUS; SUBCUTANEOUS at 14:29

## 2025-01-16 RX ADMIN — Medication 1 APPLICATION(S): at 17:25

## 2025-01-16 RX ADMIN — Medication 1 APPLICATION(S): at 06:06

## 2025-01-16 RX ADMIN — Medication 1 DROP(S): at 06:06

## 2025-01-16 RX ADMIN — Medication 1 SPRAY(S): at 06:06

## 2025-01-16 RX ADMIN — Medication 15 MILLILITER(S): at 05:50

## 2025-01-16 RX ADMIN — Medication 100 MILLIGRAM(S): at 17:10

## 2025-01-16 RX ADMIN — Medication 1 DROP(S): at 14:28

## 2025-01-16 RX ADMIN — Medication 1 DROP(S): at 17:07

## 2025-01-16 RX ADMIN — Medication 1 APPLICATION(S): at 06:07

## 2025-01-16 RX ADMIN — Medication 100 MILLIGRAM(S): at 00:23

## 2025-01-16 RX ADMIN — HEPARIN SODIUM 5000 UNIT(S): 1000 INJECTION INTRAVENOUS; SUBCUTANEOUS at 23:02

## 2025-01-16 RX ADMIN — OFLOXACIN 1 DROP(S): 3 SOLUTION OPHTHALMIC at 11:14

## 2025-01-16 RX ADMIN — LEVETIRACETAM 1500 MILLIGRAM(S): 10 INJECTION, SOLUTION INTRAVENOUS at 05:50

## 2025-01-16 RX ADMIN — ACETYLCYSTEINE 4 MILLILITER(S): 200 INHALANT RESPIRATORY (INHALATION) at 11:11

## 2025-01-16 RX ADMIN — BACLOFEN 5 MILLIGRAM(S): 10 INJECTION INTRATHECAL at 17:07

## 2025-01-16 RX ADMIN — Medication 25 GRAM(S): at 23:01

## 2025-01-16 RX ADMIN — OFLOXACIN 1 DROP(S): 3 SOLUTION OPHTHALMIC at 23:04

## 2025-01-16 RX ADMIN — ACETYLCYSTEINE 4 MILLILITER(S): 200 INHALANT RESPIRATORY (INHALATION) at 17:07

## 2025-01-16 RX ADMIN — Medication 15 MILLILITER(S): at 17:07

## 2025-01-16 RX ADMIN — Medication 100 MILLIGRAM(S): at 09:37

## 2025-01-16 RX ADMIN — OFLOXACIN 1 DROP(S): 3 SOLUTION OPHTHALMIC at 06:59

## 2025-01-16 RX ADMIN — Medication 40 MILLIGRAM(S): at 11:12

## 2025-01-16 RX ADMIN — Medication 20 MILLIEQUIVALENT(S): at 09:36

## 2025-01-16 RX ADMIN — IPRATROPIUM BROMIDE AND ALBUTEROL SULFATE 3 MILLILITER(S): .5; 2.5 SOLUTION RESPIRATORY (INHALATION) at 23:07

## 2025-01-16 RX ADMIN — Medication 1 SPRAY(S): at 17:08

## 2025-01-16 RX ADMIN — IPRATROPIUM BROMIDE AND ALBUTEROL SULFATE 3 MILLILITER(S): .5; 2.5 SOLUTION RESPIRATORY (INHALATION) at 17:06

## 2025-01-16 RX ADMIN — IPRATROPIUM BROMIDE AND ALBUTEROL SULFATE 3 MILLILITER(S): .5; 2.5 SOLUTION RESPIRATORY (INHALATION) at 11:11

## 2025-01-16 RX ADMIN — LEVETIRACETAM 1500 MILLIGRAM(S): 10 INJECTION, SOLUTION INTRAVENOUS at 17:06

## 2025-01-16 RX ADMIN — ACETYLCYSTEINE 4 MILLILITER(S): 200 INHALANT RESPIRATORY (INHALATION) at 05:50

## 2025-01-16 RX ADMIN — ACETYLCYSTEINE 4 MILLILITER(S): 200 INHALANT RESPIRATORY (INHALATION) at 23:07

## 2025-01-16 RX ADMIN — Medication 1 DROP(S): at 02:03

## 2025-01-16 RX ADMIN — DOXAZOSIN MESYLATE 8 MILLIGRAM(S): 8 TABLET ORAL at 23:02

## 2025-01-16 RX ADMIN — HEPARIN SODIUM 5000 UNIT(S): 1000 INJECTION INTRAVENOUS; SUBCUTANEOUS at 05:50

## 2025-01-16 RX ADMIN — AMLODIPINE BESYLATE 5 MILLIGRAM(S): 10 TABLET ORAL at 05:50

## 2025-01-16 NOTE — PROGRESS NOTE ADULT - SUBJECTIVE AND OBJECTIVE BOX
O/N Events: GEORGE  Subjective/ROS: No complaints.    VITALS  Vital Signs Last 24 Hrs  T(C): 36.3 (16 Jan 2025 09:01), Max: 36.4 (15 Sourav 2025 22:07)  T(F): 97.4 (16 Jan 2025 09:01), Max: 97.5 (15 Sourav 2025 22:07)  HR: 54 (16 Jan 2025 08:40) (50 - 76)  BP: 133/93 (16 Jan 2025 07:59) (116/81 - 133/93)  BP(mean): 114 (16 Jan 2025 07:59) (95 - 114)  RR: 14 (16 Jan 2025 08:40) (14 - 18)  SpO2: 100% (16 Jan 2025 08:40) (98% - 100%)    Parameters below as of 16 Jan 2025 08:40  Patient On (Oxygen Delivery Method): ventilator    O2 Concentration (%): 40    I&O's Summary    15 Sourav 2025 07:01  -  16 Jan 2025 07:00  --------------------------------------------------------  IN: 1440 mL / OUT: 1800 mL / NET: -360 mL    16 Jan 2025 07:01  -  16 Jan 2025 11:29  --------------------------------------------------------  IN: 120 mL / OUT: 450 mL / NET: -330 mL    CAPILLARY BLOOD GLUCOSE    PHYSICAL EXAM  General: unresponsive on ventilator, NAD, no labored breathing   HEENT: trach collar in place, clean  Lungs: anterior exam, limited, no crackles, no wheezes  Heart: regular  Abdomen: soft, no distension, + BS  Extremities:  warm, trace edema, not following commands     MEDICATIONS  (STANDING):  acetylcysteine 10%  Inhalation 4 milliLiter(s) Inhalation every 6 hours  albuterol/ipratropium for Nebulization 3 milliLiter(s) Nebulizer every 6 hours  amLODIPine   Tablet 5 milliGRAM(s) Oral daily  artificial tears (preservative free) Ophthalmic Solution 1 Drop(s) Right EYE every 4 hours  baclofen 5 milliGRAM(s) Oral every 12 hours  chlorhexidine 0.12% Liquid 15 milliLiter(s) Oral Mucosa every 12 hours  chlorhexidine 2% Cloths 1 Application(s) Topical <User Schedule>  doxazosin 8 milliGRAM(s) Oral at bedtime  erythromycin   Ointment 1 Application(s) Right EYE at bedtime  fluticasone propionate 50 MICROgram(s)/spray Nasal Spray 1 Spray(s) Both Nostrils two times a day  heparin   Injectable 5000 Unit(s) SubCutaneous every 8 hours  influenza   Vaccine 0.5 milliLiter(s) IntraMuscular once  levETIRAcetam 1500 milliGRAM(s) Oral two times a day  ofloxacin 0.3% Solution 1 Drop(s) Right EYE four times a day  pantoprazole  Injectable 40 milliGRAM(s) IV Push daily  petrolatum Ophthalmic Ointment 1 Application(s) Both EYES two times a day  phenytoin   Suspension 100 milliGRAM(s) Oral every 8 hours  piperacillin/tazobactam IVPB.. 3.375 Gram(s) IV Intermittent every 8 hours  propranolol 10 milliGRAM(s) Oral every 8 hours  sodium chloride 0.65% Nasal 1 Spray(s) Both Nostrils two times a day    MEDICATIONS  (PRN):  acetaminophen   Oral Liquid .. 650 milliGRAM(s) Oral every 6 hours PRN Temp greater or equal to 38.5C (101.3F), Mild Pain (1 - 3)      Allergy Status Unknown      LABS                        10.0   4.99  )-----------( 208      ( 16 Jan 2025 05:30 )             30.1     01-16    141  |  104  |  51[H]  ----------------------------<  111[H]  3.5   |  25  |  1.30    Ca    9.1      16 Jan 2025 05:30  Phos  3.6     01-16  Mg     2.1     01-16    TPro  6.5  /  Alb  3.3  /  TBili  0.2  /  DBili  x   /  AST  14  /  ALT  34  /  AlkPhos  163[H]  01-15      Urinalysis Basic - ( 16 Jan 2025 05:30 )    Color: x / Appearance: x / SG: x / pH: x  Gluc: 111 mg/dL / Ketone: x  / Bili: x / Urobili: x   Blood: x / Protein: x / Nitrite: x   Leuk Esterase: x / RBC: x / WBC x   Sq Epi: x / Non Sq Epi: x / Bacteria: x            IMAGING/EKG/ETC: reviewed

## 2025-01-16 NOTE — PROGRESS NOTE ADULT - SUBJECTIVE AND OBJECTIVE BOX
HPI:  Unknown age male, unknown past medical history (reported stroke and MI by coworkers) presented to [facility] with AMS, Pt was working at [employer] when he was found down by coworkers. EMS called and pt brought to [facility] ED. Intubated, sedated, started on cardene for SBPs in 200s. CT head showed brain stem bleed. Transferred to NSICU for further management.  (30 Sep 2024 12:55)      HOSPITAL COURSE:  9/30: transferred from [facility]. A line placed. Versed dc'd. Cy Rader at bedside, states pt has family in [place], cannot confirm medications or PMH other than stroke and MI. 250cc bolus 3% given. LR switched to NS. hydralazine 25q8 started, 3% started, switched propofol to precedex   10/1: stability CTH done. Added labetalol, started TF. Palliative consulted. ethics consulted to determine surrogate. febrile 103, pan cx sent  10/2: BD 2, GEORGE overnight. TF resumed. Desatt'd to 80s, FiO2 inc. to 50. Fentanyl given, ABG, CXR ordered. Maxxed on precedex, started on propofol for DARIEN -4 - -5. Precedex dc'd. Duonebs, mucomyst, hypertonic added. 3% dc'd. Cardene dc'd. Start vanc/CTX. Increased labetalol 200q8. MRSA negative, dc'd vanc. ETT pulled back 2cm x 2, good positioning after confirmatory chest xray. Ethics attempting to establish HCP with family. Na 159, starting FW 250q6 for range 150-155.   10/3: BD3, GEORGE o/n, neuro stable. Na elevating, FW increased to 300q6. Dc'd bowel reg for diarrhea. vEEG started. SQH 5000q8 tonight.   10/4: BD 4, albumn bolus, incr. LR to 80 2/2 incr. in Cr, LR to 10 0cc/hr for uptrending Cr. Started 7% hypersal for 48hrs and SL atropine for inline/oral thick secretions. Dc'd CTX and started ancef for MSSA in the sputum. Nephrology consulted for CKD, f/u recs. SBP 170s, given hydralazine 10mg IVP.   10/5: BD5, o/n 10mg IVP hydralazine given for SBP 170s and started on hydralazine 25q8 via OGT. 10mg IV push labetalol for SBP > 160s. RT placed for diarrhea.   10/6: BD6, o/n FW increased to 350q4 per nephrology recs. IV tylenol for temp 100.6, SBp 160s presumed uncomfortable.   10/7: BD7, overnight pancultured for temp 101.8F.   10/8: BD8. GEORGE. Cr bumped. decreased LR to 75cc/hr. Adding simethicone ATC. incr hydralazine 50mgTID. Incr labetalol 300mgTID. Na 145, decreased FWF to 250q6. Start precedex. FENa consistent with intrinsic kidney injury. Pend repeat renal US. Retaining up to 1.3L, bladder scans q6, straight cath PRN  10/9: BD 9. GEORGE overnight. Neuro stable. abd xray for distention w non-specific gas pattern, OGT to LIWS for morning. duonebs/mucomyst to q8 for improving secretions. Changed tube feeds to Jevity 1.5 20cc/hr, low rate due to abdominal distention, nepro dense and more difficult to digest. Tolerating CPAP, confirmed by ABG.   10/10: BD 10. GEORGE overnight. Neuro stable. (+) gabreil for urinary retention on bladder scan. inc TF to goal rate of 40cc/hr. family leaning toward pursuing trach/PEG. 1/2 amp for FS 81.   10/11: BD 11. GEORGE overnight. Neuro stable. Trach/PEG consults placed.   10/12: BD 12. GEORGE overnight. Neuro stable. MRI brain complete.   10/13: BD 13. Increase flomax. Hold SQH after PM dose for trach tm. IVL.   10/14: BD 14. GEORGE overnight, remains on AC/VC. Gabriel placed for urinary retention. Dc'd free water.  S/p trach with pulm. NGT placed and CXR confirmed in good position.   10/15: BD 15, GEORGE ovn. resumed feeds. spiked 101, pan cx sent.   10/16: BD 16. GEORGE ovn. Lokelma 5mg for K+ 5.4. Started vanc q 24/zosyn for empiric PNA coverage, IVF to 100/hr. PEG held for fever.   10/17: BD 17,  ordered serum osm and urine osm for am. Started sinemet for neurostimulation. Increased cardura to 0.8. Started FW 100q4, dc'd IVF. MRSA negative, dc'd vanc. NGT replaced d/t coiling.   10/18: BD 18, GEORGE overnight, neuro stable. Amantadine added for neurostim. zosyn changed to unasyn for acinetobacter baumannii, failed TOV and required SC  10/19: BD 19, GEORGE ovn. cardura 2mg added for retention. labetalol decreased 200q8, hydralazine decreased 25q8. Gabriel replaced.   10/20: BD20, GEORGE overnight. NGT dislodged, replaced. PEG tomorrow w/ gen surg, FW increased to 150q4 and labetalol decreased to 100q8, lokelma given for hyperkalemia.   10/21: BD 21. POD0 PEG placement with Gen surg. decr labetolol to 50q8, incr. cardura to 0.4, started lokelma and phoslo, dc gabriel POD0 PEG placement with Gen surg.  10/22: BD 22. Plan to start TF today via PEG. dc labetalol, Following ophtho recs. Increased apnea settings - found to be in cheyne-moe respiration. CPAP 5/5.  10/23: BD 23. hydralazine d/c'd, trach collar trial today. Rectal tube placed at 6am.  10/24: BD24, o/n lokelma held due to diarrhea. Free water 100q6 resumed. dc'd tamsulosin, amantadine. Incr'd cardura to 8mg qhs. Dc'd FW. Switched jevity to nepro. gabriel placed for high urine output. Started SL atropine for oral secretions. Dc'd free water.  10/25: BD25, o/n decreased suctioning requirements to > q4hrs, GEORGE. Cr improving, cont phoslo, lokelma held at this time. Gabriel placed yest, cont. Tolerating trach collar. Given 500cc plasmalyte bolus for ANIKA. Dc'd sinemet.   10/26: BD26, o/n resumed lokelma 5mg daily and resumed 100cc free water q6hrs. Change in neuro status with new right pupillary dilation with anisocoria (right pupil 6mm fixed and left pupil 3mm briskly reactive). Given 23.4% NaCl bullet, taken for emergent CTH showing mostly resolved pontine hemorrhage, continued brainstem hypodensity likely edema d/t hemorrhage, no new hemorrhage or infarct, no herniation, mild increase in size of left lateral ventricle. Vitals remaine stable. Na goal > 140.   10/27: BD27, o/n GEORGE.Neuro stable. Pend stepdown with airway bed.   10/28: BD 28. GEORGE overnight. Neuro stable. Miralax ordered. Gabriel removed, pending TOV.  10/29: BD 29. GEORGE o/n. Given 2L NS over 8 hrs for increased BUN/Cr ratio. Gabriel placed for frequent straight cath.   10/30: BD 30.   10/31: BD 31. GEOGRE overnight. Na 149, increased free water to 200q6. 1L NS for uptrending BUN.   11/1: BD 32. GEORGE overnight. Given 1L NS for dehydration. Na 146, increased FW to 250q6.   11/2: BD 33 GEORGE overnight, neuro stable, given 500cc bolus for net negative status and tachycardia   11/3: BD 34, GEORGE overnight, neuro stable. Patient remains tachycardic, EKG showing sinus tachycardia, given additional 500cc NS bolus. Febrile to 101.9F, pan cultured (without UA), CXR WNL, given tylenol.   11/4: BD 35, GEORGE overnight, neuro stable. Given 1L NS for tachycardia. sputum (+) for stenotropohomas maltophilia.   11/5: BD 36 GEORGE overnight, neuro stable. Vancomycin dc'd. Chest PT BID. ID consulted, cont zosyn.  11/6: BD 37. blood cx + klebsiella dc zosyn changed to cefepime, CTAP ordered, rpt blood cx sent.    11/7: BD 38. Pending CT A/P, given 250cc bolus and starting maintenance fluids overnight. Pending CT A/P after bolus   11/8: BD 39. CT CAP negative for infection.   11/9: BD 40. GEORGE overnight.  11/10: BD 41. GEORGE overnight. desat to 85 on trach collar, O2 inc to 10L and 100%, O2 sat inc to 95. pt tachy to 110s, euvolemic. given tylenol. ABG and CXR ordered. spiked fever, pancultured, RVP negative. AM ABG w pO2 79, rpt w pO2 79. pt appears comfortable, satting 94%.   11/11: BD 42. GEORGE overnight. pt became tachy to 130s, desat to 90 on 100% FiO2 and 10L. suctioned, (+) productive cough. temp 101.4, given 1g IV tylenol and 500cc NS bolus for euvolemia. fever and HR downtrending. LE dopplers negative for dvt  11/12: BD 43, GEORGE ovn, fever and HR downtrending, satting 97% 70% FIO2  11/13: BD 44, GEORGE ovn. started standing tylenol x24 hours for tachycardia. desat to 80s, o2 increased. CXR stable, pending CTA PE protocol.   11/14: BD 45, GEORGE overnight, neuro stable. resp therapy dec FiO2 to 70%.   11/15: BD 46, GEORGE overnight, neuro stable.  Rapid called for desaturation 30s, tachycardic 140s. Patient bagged, 100% fio2, heavily suctioned. CXR/POCUS unremarkable. ABG c/w desaturation. WBC 14.71. Afebrile. O2 improved to 90s and patient upgraded to ICU. ABG paO2 30s improved to 89 on vent. IV Tylenol x 1, sputum sent. Start protonix while o-n vent.   11/16: BD 47. POCUS showed collapsable IVCF, given 1L bolus. Vanco/Cefpime added empriic for PNA, NGT feeds restarted. MRSA swab neg, Vanc DC'd.   11/17: BD 48. GEORGE overnight. 1l bolus for tachycardia. Spiked to 101, cultured. 500cc bolus for tachycardia, tachy to 148 given 25mcg fentanyl, 250cc albumin, 1.5L bolus. 5 IV lopressor with response HR to 100s. +Stenotrophomonas on sputum cx.   11/18: BD 49. GEORGE overnight. Consulted ID, cefepime switched to bactrim x7days. Started hydrochlorothiazine 12.5mg daily.  11/19: BD 50. Tachy 120s, given tylenol and 500cc NS. Tolerating 5/5, switched to TCx. Holding phos binder. D/c Bactrim. D/c gabriel, f/u TOV. Dc'd PPI.   11/20: BD 51. GEORGE ovn. 1600 satting low 90s, mildly tachy to 110s, afebrile, RR wnl. O2 improved to mid 90s while inc O2 to 100% on TCx. CPAP 5/5 placed back on.  11/21: BD 52, GEORGE ovn, tolerating CPAP 5/5. Switch to trach collar during the day if tolerating well. HCTZ held for Cr bump, straight cath frequence increased to q4  11/22: BD 53, GEORGE ovn. Resumed phoslo. Gabriel placed. Resumed HCTZ.   11/23: BD 54. Holding tylenol in setting of possible fever, will require pan cx if febrile. Cr improved today. Cont CPAP. Bowel regimen held i/s/o diarrhea. FOBT negative.  11/24: BD 55. GEORGE overnight. Neuro stable. HCTZ dc'ed, started lisinopril 5. Lokelma dc'ed for K 3.7.   11/25: BD 56, GEORGE overnight. Neuro stable. dc'd lisinopril 5mg. Gabriel dc'd. TOV. 1545 noted to be hypotensive, MAP 50, in supine position on chair, HR 60s, afebrile, O2 96%. Given 1L cc NS bolus, placed back on bed in reverse trendelenberg, improved to map of 66. Neostick at bedside. Vitals check q1h. Dc'ed amlodipine. Failed TOV, bladder scan q6, sc prn. Added back Senna.   11/26: BD 57, GEORGE overnight. Neuro stable. Dc'd phoslo.   11/27: BD 58, GEORGE overnight. Neuro stable.    11/28: BD 59. Gabriel replaced.   11/29: GEORGE.  11/30: GEORGE, neuro stable.   12/1: BD 62, GEORGE overnight  12/2: BD 63, GEORGE overnight.; Given 1L bolus of LR for uptrending BUN/Cr.  12/3: BD 64. Reinstated eye gtt/moisture chamber given increased Rt eye injection  12/4: BD 65. GEORGE overnight. Attempted to speak with ophtho regarding eyelid weight/closure but no answer, full mailbox.   12/5: BD 66, GEORGE overnight, bowel regimen increased and had BM.   12/6: BD 67, GEORGE overnight, neuro stable.  12/7: GEORGE overnight, neuro stable. /110s, given x1 hydralazine 10 mg IVP. Restarted home amlodipine 5mg.  12/8: GEORGE. OOB to chair.     12/11: GEORGE, mucomyst added for thick secretions, simethicone for abd distension, abd xray with stool burden, increased bowel regimen.   12/12: GEORGE overnight.   12/13: GEORGE overnight.   12/14: GEORGE overnight  12/15: o/n Patient became tachycardic HR 120s and 10 minutes later O2 sat dropped as low as 89%. Patient suctioned without improvement in O2 sat and tube feeds found in suction catheter. TFs held.  STAT CXR ordered. STAT labs sent. Respiratory therapist called to bedside and patient trach connected to ventilator. After connection to ventilator and further suctioning O2 sat improved to 97% but patient HR remains 120-130s. Upgraded to NSICU for further management. Vancomycin and zosyn started. CTA  chest PE protocol and CTH ordered. Blood cultures sent.given 500cc bolus, rpt ABG sent pO2 243, CTH and CTA chest done. FS while NPO, FiO2 dec 50 pending ABG. sputum cx positive for few GPC and GNR.   12/16: GEORGE overnight, restarted amlodipine, troponin 75   12/17: GEORGE overnight, neuro stable. Attempted CPAP this morning, did not tolerate and back on full vent support. Dc'd Vanc. Tachycardia to 140s, noted extremities to be twitching along with jaw twitching. Given 2g of Keppra, total 4mg ativan and placed on EEG, full set of labs and lactate negative. Resumed trickle feeds at 20cc/hr. Given 250cc albumin for tachycardia.   12/18: GEORGE overnight. Neuro stable. CTH stable. EEG negative and dc'd.   12/19: GEORGE overnight. neuro stable. SIMV most of day, AC/VC at night.   12/20: GEORGE overnight, neuro stable. remains on VC/AC  12/21: GEORGE ovn. 1L bolus for tachy to 120s-130s.   12/22: GEORGE ovn. Tachy to 120s, given tylenol and IVF. 2mg IV ativan given for L jaw twitching. Sx resolved for 3 minutes and started again. Epilepsy contacted. Given 2mg IV ativan. Continued twitching. Increased keppra to 1500mg BID, 2mg ativan given. Propranolol started for refractory tachycardia. vEEG ordered. albumin given. POCUS performed, no b lines. Started on fosphenytoin, loaded w 20mg/kg then 100mg TID. febrile, pancx, started cefepime 2g q8, vancomycin  12/23: GEORGE ovn. +focal motor seizures on eeg. ID consulted. Vancomycin dc'ed as per ID.  12/24: GEORGE ovn, neuro stable. Baclofen 5 mg q12 started for hiccups. Na 129 from 134. Urine lytes c/w SIADH. Given 250cc 3% bolus. CT chest for infection w/u - f/u read. Repeat Na 136. UA negative  12/25: GEORGE ovn.   12/26: GEORGE ovn  12/27: GEORGE overnight. Blood cx neg @ 4 days, DC cefepime per medicine (sputum colonized).   12/28: Desaturation o/n to 80's, CXR obtained, pulse ox changed and sats resolved. Na 133 from 138, 250cc 3% bolus given. Cefepime resumed until 12/30 per ID. Rpt Na 138.  12/29: GEORGE overnight. Na stable.   12/30: GEORGE overnight. Family meeting with son, pt now DNR.   12/31: GEORGE overnight.   1/1: GEORGE overnight, neuro stable.  1/2: GEORGE overnight, neuro stable. FW decreased to 100q6.   1/3: GEORGE overnight, neuro stable. fosphenytoin IV changed to pheytoin PO via PEG per epilepsy recommendations  1/4: GEORGE overnight, neuro stable. FW incr. to 100q4  1/5: GEORGE overnight, neuro stable.   1/6: GEORGE overnight, neuro stable   1/7: GEORGE overnight, neuro stable. BUN/Cr increased, increased FW to 200q6. Given 1L bolus of LR over 2 hours. Gabriel d/c'd, voiding, continue bladder scan q6.   1/8: GEORGE overnight, neuro stable. BUN/Cr improving.  1/9: GEORGE overnight, neuro stable.   1/10: GEORGE overnight, neuro stable.   1/11: GEORGE overnight, neuro stable, vent settings stable.   1/12: GEORGE overnight, neuro stable. Febrile to 102F, f/u pan cx, chest xray, given tylenol. Zosyn started.   1/13: GEORGE overnight, neuro stable, cont Zosyn for presumed PNA, prelim sputum cx growing; few GS neg diplococci, mod GS neg rods, rare GS + rods, fever trend improved. Free water increased to [id number].   1/14: GEORGE overnight. pending renal US for elevated Cr  1/15: GEORGE ovn. renal US complete.   1/16: GEORGE overnight, neuro stable     OVERNIGHT EVENTS: Pt seen and examined in bed, unable to offer any acute complaints at this time.     Vital Signs Last 24 Hrs  T(C): 36.4 (15 Sourav 2025 22:07), Max: 36.4 (15 Sourav 2025 22:07)  T(F): 97.5 (15 Sourav 2025 22:07), Max: 97.5 (15 Sourav 2025 22:07)  HR: 58 (16 Jan 2025 00:10) (54 - 62)  BP: 116/81 (16 Jan 2025 00:10) (112/78 - 132/91)  BP(mean): 95 (16 Jan 2025 00:10) (91 - 107)  RR: 14 (16 Jan 2025 00:10) (14 - 18)  SpO2: 98% (16 Jan 2025 00:10) (98% - 100%)    Parameters below as of 16 Jan 2025 00:10  Patient On (Oxygen Delivery Method): ventilator    O2 Concentration (%): 40    I&O's Summary    14 Jan 2025 07:01  -  15 Sourav 2025 07:00  --------------------------------------------------------  IN: 2371 mL / OUT: 1395 mL / NET: 976 mL    15 Sourav 2025 07:01  -  16 Jan 2025 01:12  --------------------------------------------------------  IN: 820 mL / OUT: 1000 mL / NET: -180 mL        PHYSICAL EXAM:  Constitutional: Pt found in bed in NAD   ENT: PERRL, vertical EOMi  Respiratory: +trach, breathing non-labored, symmetrical chest wall movement  Cardiovascuar: RRR, no murmurs  Gastrointestinal: +PEG, abdomen soft, non tender  Neurological:  AAOX0, verbal function not intact  follows commands by right handgrip  Motor: RUE moves hand spontaneously, RLE: wiggles toes spontaneously, LUE: 0/5, LLE 0/5  Wounds/Drains: N/A    TUBES/LINES:  [] Gabriel  [] Lumbar Drain  [] Wound Drains  [] Others      DIET:  [] NPO  [] Mechanical  [x] Tube feeds via PEG     LABS:                        9.6    5.95  )-----------( 211      ( 15 Sourav 2025 05:30 )             30.4     01-15    137  |  102  |  55[H]  ----------------------------<  135[H]  3.7   |  23  |  1.51[H]    Ca    9.0      15 Sourav 2025 05:30  Phos  3.8     01-15  Mg     2.1     01-15    TPro  6.5  /  Alb  3.3  /  TBili  0.2  /  DBili  x   /  AST  14  /  ALT  34  /  AlkPhos  163[H]  01-15      Urinalysis Basic - ( 15 Sourav 2025 05:30 )    Color: x / Appearance: x / SG: x / pH: x  Gluc: 135 mg/dL / Ketone: x  / Bili: x / Urobili: x   Blood: x / Protein: x / Nitrite: x   Leuk Esterase: x / RBC: x / WBC x   Sq Epi: x / Non Sq Epi: x / Bacteria: x          CAPILLARY BLOOD GLUCOSE          Drug Levels: [] N/A    CSF Analysis: [] N/A      Allergies    Allergy Status Unknown    Intolerances      MEDICATIONS:  Antibiotics:  piperacillin/tazobactam IVPB.. 3.375 Gram(s) IV Intermittent every 8 hours    Neuro:  acetaminophen   Oral Liquid .. 650 milliGRAM(s) Oral every 6 hours PRN  baclofen 5 milliGRAM(s) Oral every 12 hours  levETIRAcetam 1500 milliGRAM(s) Oral two times a day  phenytoin   Suspension 100 milliGRAM(s) Oral every 8 hours    Anticoagulation:  heparin   Injectable 5000 Unit(s) SubCutaneous every 8 hours    OTHER:  acetylcysteine 10%  Inhalation 4 milliLiter(s) Inhalation every 6 hours  albuterol/ipratropium for Nebulization 3 milliLiter(s) Nebulizer every 6 hours  amLODIPine   Tablet 5 milliGRAM(s) Oral daily  artificial tears (preservative free) Ophthalmic Solution 1 Drop(s) Right EYE every 4 hours  chlorhexidine 0.12% Liquid 15 milliLiter(s) Oral Mucosa every 12 hours  chlorhexidine 2% Cloths 1 Application(s) Topical <User Schedule>  doxazosin 8 milliGRAM(s) Oral at bedtime  erythromycin   Ointment 1 Application(s) Right EYE at bedtime  fluticasone propionate 50 MICROgram(s)/spray Nasal Spray 1 Spray(s) Both Nostrils two times a day  influenza   Vaccine 0.5 milliLiter(s) IntraMuscular once  ofloxacin 0.3% Solution 1 Drop(s) Right EYE four times a day  pantoprazole  Injectable 40 milliGRAM(s) IV Push daily  petrolatum Ophthalmic Ointment 1 Application(s) Both EYES two times a day  propranolol 10 milliGRAM(s) Oral every 8 hours  sodium chloride 0.65% Nasal 1 Spray(s) Both Nostrils two times a day    IVF:    CULTURES:  Culture Results:   >=3 organisms. Probable collection contamination. (01-12 @ 15:25)  Culture Results:   No growth at 72 Hours (01-12 @ 12:50)    RADIOLOGY & ADDITIONAL TESTS:      ASSESSMENT:  44M PMH ?stroke/MI present to [facility] after collapsing at work. Decorticate posturing, vomiting, intubated for airway protection. 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 Transferred to [facility] for [facility] management. s/p trach 10/14. s/p peg 10/21. Re-upgrade to ICU 2/2 desaturation event and suctioning requirements 11/15. Re-upgrade to NSICU 12/15 2/2 desaturation and tachycardia.    Neuro:  - neuro/vitals q4h  - pain control: tylenol prn  - seizure tx: keppra 1500mg BID, phenytoin 100mg PO TID  - vEEG (10/3-4) negative, (10/17-10/19) negative, (12/17-12/18) negative, (12/22-12/25 ) +focal motor seizures not correlating w/ EEG  - CTH 9/30: enlarged pontine hemorrhage, CTH 10/3: stable, CTH 10/25: mostly resolved pontine hemorrhage, CTH 12/15: R mastoid air cell opacification; acute otitis media vs sterile effusion, CTH 12/18: stable.  - MRI brain 10/12: parenchymal hemorrhage, acute/subacute R cerebellar stroke      CV:  - -160  - tachycardia: propranolol 10mg q8  - HTN: amlodipine 5mg  - echo (9/30) EF 75%, repeat 12/16: 57%    Resp:  - trach to vent, AC/VC 40/400/14/6  - Secretions: duonebs/mucomyst/chest PT q6h     GI:  - TF via PEG (placed 10/21 by gen surg)  - bowel regimen held for loose stool, last BM 1/13  - baclofen 5q12 for hiccups     Renal:  - IVL, FW 200q4 for hydration   - Urinary retenion: Gabriel removed 1/7, passed TOV  - CKD: trend BUN/Cr  - renal US 10/1: echogenicity c/w chronic med renal dz, repeat 10/8: inc renal echogeicity, c/f medical renal disease w increased hydro, Renal US completed 1/15, f/u read    Endo:  - A1c 5.4    Heme:  - DVT ppx: SCDs, SQH 5000u q8h   - LE dopplers negative 12/16    ID:  - last pan cx 1/12, zosyn (1/12 - )  - empiric PNA: zosyn (1/12- ), cefepime (12/22-12/30), s/p vanc (12/22-12/23), s/p zosyn (12/15-12/20), s/p vanc (12/15-12/16)  - s/p Stenotrophomonas maltophilia PNA: s/p Bactrim (11/18-11/19) s/p Cefepime (11/16-11/18)  - 11/3, (+) sputum for stenotrophomas maltophlia, blood cx (+) klebsiella, cefepime 2gq12 (11/6 - 11/12)   - empiric tx: s/p zosyn (11/3-11/6) , s/p vanc  (11/3-11/5)  - S/p Ancef (10/4-10/14) for PNA, and s/p Unasyn (10/18-10/23) +actinobacter baumanii     MISC:  - ophtho consult for keratitis  - erythromycin ointment R eye q4hrs, ofloxacin ointment R eye QID, artificial tears R eye q2hrs, moisture chamber at bedtime    Dispo: SDU status, DNR, pending PRUCOL for benefits     D/w Dr. D'Amico

## 2025-01-16 NOTE — PROGRESS NOTE ADULT - ASSESSMENT
44M with unclear PMH (?stroke, MI) who was found down at work, intubated for airway protection and found to have acute parenchymal hemorrhage within nabil with mass effect (+ acute/subacute right cerebellar infarct) in setting of hypertensive emergency, transferred to [facility] for further neurosurgical care. Hospital course c/b poor neurologic recovery s/p trach-PEG, AUR s/p gabriel, ANIKA on CKD c/b hyperkalemia, HAP s/p amp-sulbactam (EOT 10/23), K aerogenes bacteremia  treated with 2 weeks of Cefepime per ID , On 11/15 he became septic and was transferred to NSICU due to increased o2 requirements and needed Vent support , Trach Cultures + for Stenotrophomonas which was treated with 7 days of Bactrim per ID , has been weaned of Vent since 11/23 and is now on 10lts at 40% o2 through Trach Collar. Stepped up to the NSICU on 12/15 for hypoxia and tachycardia. PE ruled out. On abx for 5 days. Unclear etiology. Now stepped down to 8Lach.    # Pontine hemorrhage  # Encephalopathy due to Intracranial Bleed   - Acute parenchymal hemorrhage within nabil with mass effect (+ acute/subacute right cerebellar infarct) in setting of hypertensive emergency.   - MRI brain also demonstrated acute/subacute R cerebellar stroke.   - No Neurosurgical Interventions were performed   - Secondary to IPH and cerebellar stroke patient has become Trach and Peg Dependent    #Fever   -Started on Pip/Tazo, leucocytosis resolved. Will continue on same in view of resolution of leucocytosis, follow-up Bcx  -If persistent fever get repeat RVP  -Would stop antibiotics on 1/18   -Treatment for VAP    #Acute kidney injury  # Acute Tubular Necrosis superimposed on CKD stage III  - s/p US renal with chronic medical renal disease  -  Free water to 200 q4h. Monitor UOP, bladder scan per protocol. Further management based on creatinine trend.    # Seizures  - Continue Seizure precautions   - Continue  Keppra and Fosphenytoin     # Hypertension  - Continue amlodipine 5mg daily with holding parameters  - Will continue to monitor BP and titrate antihypertensive as required     # Chronic respiratory failure.   - S/p percutaneous trach by pulm on 10/14/24  - Currently on Vent Support   - Continue Chest PT    # Neurogenic dysphagia.   - Pt s/p PEG with surgery 10/21/24  - TF per nutrition  - aspiration precautions and elevation of HOB.    #Acute urinary retention.   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

## 2025-01-16 NOTE — CHART NOTE - NSCHARTNOTEFT_GEN_A_CORE
Admitting Diagnosis:   Patient is a 44y old  Male who presents with a chief complaint of brain stem bleed (16 Jan 2025 11:29)  PAST MEDICAL & SURGICAL HISTORY:  Acute myocardial infarction  CVA (cerebral vascular accident)      Current Nutrition Order:   Diet, NPO with Tube Feed:   Tube Feeding Modality: Gastrostomy  [employer] Renal Support 1.8  Total Volume for 24 Hours (mL): 1080  Total Number of Cans: 5  Continuous  Starting Tube Feed Rate {mL per Hour}: 30  Increase Tube Feed Rate by (mL): 10     Every 4 hours  Until Goal Tube Feed Rate (mL per Hour): 60  Tube Feed Duration (in Hours): 18  Tube Feed Start Time: 10:00  Tube Feed Stop Time: 04:00  Banatrol TF     Qty per Day:  2 (12-18-24 @ 09:33) [Active]     PO Intake: N/A... remains NPO with tube feeds as primary source of nutrition (tube feeds running at goal rate this afternoon upon RD visit)     GI Issues: no noted GI upset or tube feeding tolerance issues; last BM noted on 1/16/25;    Pain: Absence of non-verbal indicators of pain    Skin Integrity: no pressure ulcers, no edema; PEG present; Mookie: 13      01-15-25 @ 07:01 - 01-16-25 @ 07:00  --------------------------------------------------------  IN: 1440 mL / OUT: 1800 mL / NET: -360 mL    01-16-25 @ 07:01 - 01-16-25 @ 13:37  --------------------------------------------------------  IN: 440 mL / OUT: 850 mL / NET: -410 mL        Labs:   01-16    141  |  104  |  51[H]  ----------------------------<  111[H]  3.5   |  25  |  1.30    Ca    9.1      16 Jan 2025 05:30  Phos  3.6     01-16  Mg     2.1     01-16    TPro  6.5  /  Alb  3.3  /  TBili  0.2  /  DBili  x   /  AST  14  /  ALT  34  /  AlkPhos  163[H]  01-15    CAPILLARY BLOOD GLUCOSE    Medications:  MEDICATIONS  (STANDING):  acetylcysteine 10%  Inhalation 4 milliLiter(s) Inhalation every 6 hours  albuterol/ipratropium for Nebulization 3 milliLiter(s) Nebulizer every 6 hours  amLODIPine   Tablet 5 milliGRAM(s) Oral daily  artificial tears (preservative free) Ophthalmic Solution 1 Drop(s) Right EYE every 4 hours  baclofen 5 milliGRAM(s) Oral every 12 hours  chlorhexidine 0.12% Liquid 15 milliLiter(s) Oral Mucosa every 12 hours  chlorhexidine 2% Cloths 1 Application(s) Topical <User Schedule>  doxazosin 8 milliGRAM(s) Oral at bedtime  erythromycin   Ointment 1 Application(s) Right EYE at bedtime  fluticasone propionate 50 MICROgram(s)/spray Nasal Spray 1 Spray(s) Both Nostrils two times a day  heparin   Injectable 5000 Unit(s) SubCutaneous every 8 hours  influenza   Vaccine 0.5 milliLiter(s) IntraMuscular once  levETIRAcetam 1500 milliGRAM(s) Oral two times a day  ofloxacin 0.3% Solution 1 Drop(s) Right EYE four times a day  pantoprazole  Injectable 40 milliGRAM(s) IV Push daily  petrolatum Ophthalmic Ointment 1 Application(s) Both EYES two times a day  phenytoin   Suspension 100 milliGRAM(s) Oral every 8 hours  piperacillin/tazobactam IVPB.. 3.375 Gram(s) IV Intermittent every 8 hours  propranolol 10 milliGRAM(s) Oral every 8 hours  sodium chloride 0.65% Nasal 1 Spray(s) Both Nostrils two times a day    MEDICATIONS  (PRN):  acetaminophen   Oral Liquid .. 650 milliGRAM(s) Oral every 6 hours PRN Temp greater or equal to 38.5C (101.3F), Mild Pain (1 - 3)    Dosing Anthropometrics:  Height for BMI (FEET)	5 Feet  Height for BMI (INCHES)	8 Inch(s)  Height for BMI (CM)	172.72 Centimeter(s)  Weight for BMI (lbs)	176.4 lb  Weight for BMI (kg)	80 kg  Body Mass Index	              26.8  ideal body weight:               154 pounds / 70 kg   % ideal body weight             114%     Weight Change: No new wt obtained since admit, strongly recommend nursing to obtain new wt to track/trend changes    Estimated energy needs:  Weight used for calculations	ideal body weight  Estimated Energy Needs Weight (lbs)	154 lb  Estimated Energy Needs Weight (kg)	69.8 kg  Estimated Energy Needs From (christiana/kg)	25  Estimated Energy Needs To (christiana/kg)	30  Estimated Energy Needs Calculated From (christiana/kg)	1745  Estimated Energy Needs Calculated To (christiana/kg)	2094  Weight used for calculations	ideal body weight  Estimated Protein Needs Weight (lbs)	154 lb  Estimated Protein Needs Weight (kg)	69.8 kg  Estimated Protein Needs From (g/kg)	1.2  Estimated Protein Needs To (g/kg)	1.4  Estimated Protein Needs Calculated From (g/kg)	83.76  Estimated Protein Needs Calculated To (g/kg)	97.72  Estimated Fluid Needs Weight (lbs)	154 lb  Estimated Fluid Needs Weight (kg)	69.8 kg  Estimated Fluid Needs From (ml/kg)	25  Estimated Fluid Needs To (ml/kg)	30  Estimated Fluid Needs Calculated From (ml/kg)	1745  Estimated Fluid Needs Calculated To (ml/kg)	2094  Other Calculations	Pt is >100% ideal body weight, thus ideal body weight used for all calculations. Needs adjusted for age, clinical course.     Subjective: This is a 44 year old male, unknown past medical history (reported stroke and MI by coworkers) presented to [facility] with AMS, Pt was working at [employer] when he was found down by coworkers. EMS called and pt brought to [facility] ED. Intubated, sedated, started on cardene for SBPs in 200s. CT head showed brain stem bleed. Transferred to NSICU for further management.    Patient seen at bedside on 8 lachman for nutrition follow up. Current diet order: NPO with tube feeds of [employer] Renal Support back at goal rate of 60 cc/hr x18 hrs (providing ~ 1944 kcal, 86g protein, 713 mL free water), meeting 100% estimated nutrient needs. GI within normal limits at this time as per flowsheets, RN reports bowel movement 1/16/25 but denies diarrhea at this time. Labs: BUN elevated (51), ALP elevated on 1/15/25 (163), medical team is aware, RD to monitor trends. Medications reviewed as above. RD will continue to follow, please see nutrition recommendations below.     Previous Nutrition Diagnosis: Inadequate Oral Intake related to inability to meet nutritional demands via PO as evidenced by NPO with tube feedings    Active [ x ]  Resolved [   ]    Goal: Pt will consume >/= 75% of estimated nutrient needs via tolerated route     Nutrition Recommendations:   1. Continue with current tube feed regimen to meet 100% estimated nutrient needs:  **[employer] Renal Support 1.8: at goal of 60mL/hour x 18 hours, providing 1080mL total volume, 1944 calories, 86g protein, ~713mL free water; this meets ~24 calories/kg ideal body weight, ~1.1g protein/kg ideal body weight**  **Provide Banatrol Tube Feeding prn**  **Maintain aspiration precautions at all times; monitor for signs/symptoms of intolerance**  2. Monitor weights (weekly), GI function, skin integrity; electrolytes, BMP, glucose, CBC per MD discretion *renal indices*  3. Pain and bowel regimen per medical team  4. Align nutrition with goals of care at all times  5. Recommend nursing to obtain new weights to track/trend changes    Education: deferred at this time related to pt's cognitive status.    Risk Level: High [   ] Moderate [ x ] Low [   ].

## 2025-01-17 LAB
ANION GAP SERPL CALC-SCNC: 9 MMOL/L — SIGNIFICANT CHANGE UP (ref 5–17)
BUN SERPL-MCNC: 46 MG/DL — HIGH (ref 7–23)
CALCIUM SERPL-MCNC: 9 MG/DL — SIGNIFICANT CHANGE UP (ref 8.4–10.5)
CHLORIDE SERPL-SCNC: 106 MMOL/L — SIGNIFICANT CHANGE UP (ref 96–108)
CO2 SERPL-SCNC: 25 MMOL/L — SIGNIFICANT CHANGE UP (ref 22–31)
CREAT SERPL-MCNC: 1.23 MG/DL — SIGNIFICANT CHANGE UP (ref 0.5–1.3)
CULTURE RESULTS: SIGNIFICANT CHANGE UP
CULTURE RESULTS: SIGNIFICANT CHANGE UP
EGFR: 73 ML/MIN/1.73M2 — SIGNIFICANT CHANGE UP
EGFR: 73 ML/MIN/1.73M2 — SIGNIFICANT CHANGE UP
GLUCOSE SERPL-MCNC: 102 MG/DL — HIGH (ref 70–99)
HCT VFR BLD CALC: 32.9 % — LOW (ref 39–50)
HGB BLD-MCNC: 10.8 G/DL — LOW (ref 13–17)
MAGNESIUM SERPL-MCNC: 2.1 MG/DL — SIGNIFICANT CHANGE UP (ref 1.6–2.6)
MCHC RBC-ENTMCNC: 30.8 PG — SIGNIFICANT CHANGE UP (ref 27–34)
MCHC RBC-ENTMCNC: 32.8 G/DL — SIGNIFICANT CHANGE UP (ref 32–36)
MCV RBC AUTO: 93.7 FL — SIGNIFICANT CHANGE UP (ref 80–100)
NRBC # BLD: 0 /100 WBCS — SIGNIFICANT CHANGE UP (ref 0–0)
NRBC BLD-RTO: 0 /100 WBCS — SIGNIFICANT CHANGE UP (ref 0–0)
PHOSPHATE SERPL-MCNC: 3.4 MG/DL — SIGNIFICANT CHANGE UP (ref 2.5–4.5)
PLATELET # BLD AUTO: 200 K/UL — SIGNIFICANT CHANGE UP (ref 150–400)
POTASSIUM SERPL-MCNC: 4 MMOL/L — SIGNIFICANT CHANGE UP (ref 3.5–5.3)
POTASSIUM SERPL-SCNC: 4 MMOL/L — SIGNIFICANT CHANGE UP (ref 3.5–5.3)
PROCALCITONIN SERPL-MCNC: 0.27 NG/ML — HIGH (ref 0.02–0.1)
RBC # BLD: 3.51 M/UL — LOW (ref 4.2–5.8)
RBC # FLD: 13.8 % — SIGNIFICANT CHANGE UP (ref 10.3–14.5)
SODIUM SERPL-SCNC: 140 MMOL/L — SIGNIFICANT CHANGE UP (ref 135–145)
SPECIMEN SOURCE: SIGNIFICANT CHANGE UP
SPECIMEN SOURCE: SIGNIFICANT CHANGE UP
WBC # BLD: 5.83 K/UL — SIGNIFICANT CHANGE UP (ref 3.8–10.5)
WBC # FLD AUTO: 5.83 K/UL — SIGNIFICANT CHANGE UP (ref 3.8–10.5)

## 2025-01-17 PROCEDURE — 99231 SBSQ HOSP IP/OBS SF/LOW 25: CPT

## 2025-01-17 PROCEDURE — 99233 SBSQ HOSP IP/OBS HIGH 50: CPT

## 2025-01-17 RX ORDER — PIPERACILLIN-TAZO-DEXTROSE,ISO 3.375G/5
3.38 IV SOLUTION, PIGGYBACK PREMIX FROZEN(ML) INTRAVENOUS EVERY 8 HOURS
Refills: 0 | Status: COMPLETED | OUTPATIENT
Start: 2025-01-17 | End: 2025-01-18

## 2025-01-17 RX ADMIN — IPRATROPIUM BROMIDE AND ALBUTEROL SULFATE 3 MILLILITER(S): .5; 2.5 SOLUTION RESPIRATORY (INHALATION) at 19:33

## 2025-01-17 RX ADMIN — HEPARIN SODIUM 5000 UNIT(S): 1000 INJECTION INTRAVENOUS; SUBCUTANEOUS at 07:01

## 2025-01-17 RX ADMIN — Medication 25 GRAM(S): at 07:01

## 2025-01-17 RX ADMIN — BACLOFEN 5 MILLIGRAM(S): 10 INJECTION INTRATHECAL at 17:43

## 2025-01-17 RX ADMIN — IPRATROPIUM BROMIDE AND ALBUTEROL SULFATE 3 MILLILITER(S): .5; 2.5 SOLUTION RESPIRATORY (INHALATION) at 07:01

## 2025-01-17 RX ADMIN — OFLOXACIN 1 DROP(S): 3 SOLUTION OPHTHALMIC at 12:53

## 2025-01-17 RX ADMIN — Medication 1 DROP(S): at 07:02

## 2025-01-17 RX ADMIN — ACETYLCYSTEINE 4 MILLILITER(S): 200 INHALANT RESPIRATORY (INHALATION) at 19:33

## 2025-01-17 RX ADMIN — Medication 1 DROP(S): at 22:45

## 2025-01-17 RX ADMIN — Medication 1 APPLICATION(S): at 07:03

## 2025-01-17 RX ADMIN — Medication 100 MILLIGRAM(S): at 17:43

## 2025-01-17 RX ADMIN — OFLOXACIN 1 DROP(S): 3 SOLUTION OPHTHALMIC at 07:03

## 2025-01-17 RX ADMIN — Medication 100 MILLIGRAM(S): at 01:18

## 2025-01-17 RX ADMIN — Medication 1 APPLICATION(S): at 07:02

## 2025-01-17 RX ADMIN — Medication 15 MILLILITER(S): at 07:01

## 2025-01-17 RX ADMIN — Medication 1 DROP(S): at 09:42

## 2025-01-17 RX ADMIN — Medication 1 SPRAY(S): at 17:45

## 2025-01-17 RX ADMIN — ERYTHROMYCIN 1 APPLICATION(S): 5 OINTMENT OPHTHALMIC at 21:54

## 2025-01-17 RX ADMIN — Medication 15 MILLILITER(S): at 17:42

## 2025-01-17 RX ADMIN — LEVETIRACETAM 1500 MILLIGRAM(S): 10 INJECTION, SOLUTION INTRAVENOUS at 17:42

## 2025-01-17 RX ADMIN — Medication 25 GRAM(S): at 21:53

## 2025-01-17 RX ADMIN — Medication 25 GRAM(S): at 14:52

## 2025-01-17 RX ADMIN — Medication 1 SPRAY(S): at 07:03

## 2025-01-17 RX ADMIN — LEVETIRACETAM 1500 MILLIGRAM(S): 10 INJECTION, SOLUTION INTRAVENOUS at 05:40

## 2025-01-17 RX ADMIN — OFLOXACIN 1 DROP(S): 3 SOLUTION OPHTHALMIC at 17:47

## 2025-01-17 RX ADMIN — Medication 1 DROP(S): at 14:54

## 2025-01-17 RX ADMIN — BACLOFEN 5 MILLIGRAM(S): 10 INJECTION INTRATHECAL at 05:39

## 2025-01-17 RX ADMIN — HEPARIN SODIUM 5000 UNIT(S): 1000 INJECTION INTRAVENOUS; SUBCUTANEOUS at 14:52

## 2025-01-17 RX ADMIN — ACETYLCYSTEINE 4 MILLILITER(S): 200 INHALANT RESPIRATORY (INHALATION) at 13:39

## 2025-01-17 RX ADMIN — Medication 1 APPLICATION(S): at 17:46

## 2025-01-17 RX ADMIN — Medication 100 MILLIGRAM(S): at 09:42

## 2025-01-17 RX ADMIN — FLUTICASONE PROPIONATE 1 SPRAY(S): 50 SPRAY, METERED NASAL at 07:02

## 2025-01-17 RX ADMIN — DOXAZOSIN MESYLATE 8 MILLIGRAM(S): 8 TABLET ORAL at 21:54

## 2025-01-17 RX ADMIN — Medication 1 DROP(S): at 17:43

## 2025-01-17 RX ADMIN — HEPARIN SODIUM 5000 UNIT(S): 1000 INJECTION INTRAVENOUS; SUBCUTANEOUS at 21:54

## 2025-01-17 RX ADMIN — Medication 40 MILLIGRAM(S): at 12:53

## 2025-01-17 RX ADMIN — FLUTICASONE PROPIONATE 1 SPRAY(S): 50 SPRAY, METERED NASAL at 17:44

## 2025-01-17 RX ADMIN — ACETYLCYSTEINE 4 MILLILITER(S): 200 INHALANT RESPIRATORY (INHALATION) at 07:01

## 2025-01-17 RX ADMIN — IPRATROPIUM BROMIDE AND ALBUTEROL SULFATE 3 MILLILITER(S): .5; 2.5 SOLUTION RESPIRATORY (INHALATION) at 14:54

## 2025-01-17 RX ADMIN — AMLODIPINE BESYLATE 5 MILLIGRAM(S): 10 TABLET ORAL at 07:03

## 2025-01-17 NOTE — PROGRESS NOTE ADULT - SUBJECTIVE AND OBJECTIVE BOX
HPI:  Unknown age male, unknown past medical history (reported stroke and MI by coworkers) presented to [facility] with AMS, Pt was working at [employer] when he was found down by coworkers. EMS called and pt brought to [facility] ED. Intubated, sedated, started on cardene for SBPs in 200s. CT head showed brain stem bleed. Transferred to NSICU for further management.  (30 Sep 2024 12:55)      HOSPITAL COURSE:  9/30: transferred from [facility]. A line placed. Versed dc'd. Cy Rader at bedside, states pt has family in [place], cannot confirm medications or PMH other than stroke and MI. 250cc bolus 3% given. LR switched to NS. hydralazine 25q8 started, 3% started, switched propofol to precedex   10/1: stability CTH done. Added labetalol, started TF. Palliative consulted. ethics consulted to determine surrogate. febrile 103, pan cx sent  10/2: BD 2, GEORGE overnight. TF resumed. Desatt'd to 80s, FiO2 inc. to 50. Fentanyl given, ABG, CXR ordered. Maxxed on precedex, started on propofol for DARIEN -4 - -5. Precedex dc'd. Duonebs, mucomyst, hypertonic added. 3% dc'd. Cardene dc'd. Start vanc/CTX. Increased labetalol 200q8. MRSA negative, dc'd vanc. ETT pulled back 2cm x 2, good positioning after confirmatory chest xray. Ethics attempting to establish HCP with family. Na 159, starting FW 250q6 for range 150-155.   10/3: BD3, GEORGE o/n, neuro stable. Na elevating, FW increased to 300q6. Dc'd bowel reg for diarrhea. vEEG started. SQH 5000q8 tonight.   10/4: BD 4, albumn bolus, incr. LR to 80 2/2 incr. in Cr, LR to 10 0cc/hr for uptrending Cr. Started 7% hypersal for 48hrs and SL atropine for inline/oral thick secretions. Dc'd CTX and started ancef for MSSA in the sputum. Nephrology consulted for CKD, f/u recs. SBP 170s, given hydralazine 10mg IVP.   10/5: BD5, o/n 10mg IVP hydralazine given for SBP 170s and started on hydralazine 25q8 via OGT. 10mg IV push labetalol for SBP > 160s. RT placed for diarrhea.   10/6: BD6, o/n FW increased to 350q4 per nephrology recs. IV tylenol for temp 100.6, SBp 160s presumed uncomfortable.   10/7: BD7, overnight pancultured for temp 101.8F.   10/8: BD8. GEORGE. Cr bumped. decreased LR to 75cc/hr. Adding simethicone ATC. incr hydralazine 50mgTID. Incr labetalol 300mgTID. Na 145, decreased FWF to 250q6. Start precedex. FENa consistent with intrinsic kidney injury. Pend repeat renal US. Retaining up to 1.3L, bladder scans q6, straight cath PRN  10/9: BD 9. GEORGE overnight. Neuro stable. abd xray for distention w non-specific gas pattern, OGT to LIWS for morning. duonebs/mucomyst to q8 for improving secretions. Changed tube feeds to Jevity 1.5 20cc/hr, low rate due to abdominal distention, nepro dense and more difficult to digest. Tolerating CPAP, confirmed by ABG.   10/10: BD 10. GEORGE overnight. Neuro stable. (+) gabriel for urinary retention on bladder scan. inc TF to goal rate of 40cc/hr. family leaning toward pursuing trach/PEG. 1/2 amp for FS 81.   10/11: BD 11. GEORGE overnight. Neuro stable. Trach/PEG consults placed.   10/12: BD 12. GEORGE overnight. Neuro stable. MRI brain complete.   10/13: BD 13. Increase flomax. Hold SQH after PM dose for trach tm. IVL.   10/14: BD 14. GEORGE overnight, remains on AC/VC. Gabriel placed for urinary retention. Dc'd free water.  S/p trach with pulm. NGT placed and CXR confirmed in good position.   10/15: BD 15, GEORGE ovn. resumed feeds. spiked 101, pan cx sent.   10/16: BD 16. GEORGE ovn. Lokelma 5mg for K+ 5.4. Started vanc q 24/zosyn for empiric PNA coverage, IVF to 100/hr. PEG held for fever.   10/17: BD 17,  ordered serum osm and urine osm for am. Started sinemet for neurostimulation. Increased cardura to 0.8. Started FW 100q4, dc'd IVF. MRSA negative, dc'd vanc. NGT replaced d/t coiling.   10/18: BD 18, GEORGE overnight, neuro stable. Amantadine added for neurostim. zosyn changed to unasyn for acinetobacter baumannii, failed TOV and required SC  10/19: BD 19, GEORGE ovn. cardura 2mg added for retention. labetalol decreased 200q8, hydralazine decreased 25q8. Gabriel replaced.   10/20: BD20, GEORGE overnight. NGT dislodged, replaced. PEG tomorrow w/ gen surg, FW increased to 150q4 and labetalol decreased to 100q8, lokelma given for hyperkalemia.   10/21: BD 21. POD0 PEG placement with Gen surg. decr labetolol to 50q8, incr. cardura to 0.4, started lokelma and phoslo, dc gabriel POD0 PEG placement with Gen surg.  10/22: BD 22. Plan to start TF today via PEG. dc labetalol, Following ophtho recs. Increased apnea settings - found to be in cheyne-moe respiration. CPAP 5/5.  10/23: BD 23. hydralazine d/c'd, trach collar trial today. Rectal tube placed at 6am.  10/24: BD24, o/n lokelma held due to diarrhea. Free water 100q6 resumed. dc'd tamsulosin, amantadine. Incr'd cardura to 8mg qhs. Dc'd FW. Switched jevity to nepro. gabriel placed for high urine output. Started SL atropine for oral secretions. Dc'd free water.  10/25: BD25, o/n decreased suctioning requirements to > q4hrs, GEORGE. Cr improving, cont phoslo, lokelma held at this time. Gabriel placed yest, cont. Tolerating trach collar. Given 500cc plasmalyte bolus for ANIKA. Dc'd sinemet.   10/26: BD26, o/n resumed lokelma 5mg daily and resumed 100cc free water q6hrs. Change in neuro status with new right pupillary dilation with anisocoria (right pupil 6mm fixed and left pupil 3mm briskly reactive). Given 23.4% NaCl bullet, taken for emergent CTH showing mostly resolved pontine hemorrhage, continued brainstem hypodensity likely edema d/t hemorrhage, no new hemorrhage or infarct, no herniation, mild increase in size of left lateral ventricle. Vitals remaine stable. Na goal > 140.   10/27: BD27, o/n GEORGE.Neuro stable. Pend stepdown with airway bed.   10/28: BD 28. GOERGE overnight. Neuro stable. Miralax ordered. Gabriel removed, pending TOV.  10/29: BD 29. GEORGE o/n. Given 2L NS over 8 hrs for increased BUN/Cr ratio. Gabriel placed for frequent straight cath.   10/30: BD 30.   10/31: BD 31. GEORGE overnight. Na 149, increased free water to 200q6. 1L NS for uptrending BUN.   11/1: BD 32. GEORGE overnight. Given 1L NS for dehydration. Na 146, increased FW to 250q6.   11/2: BD 33 GEORGE overnight, neuro stable, given 500cc bolus for net negative status and tachycardia   11/3: BD 34, GEORGE overnight, neuro stable. Patient remains tachycardic, EKG showing sinus tachycardia, given additional 500cc NS bolus. Febrile to 101.9F, pan cultured (without UA), CXR WNL, given tylenol.   11/4: BD 35, GEORGE overnight, neuro stable. Given 1L NS for tachycardia. sputum (+) for stenotropohomas maltophilia.   11/5: BD 36 GEORGE overnight, neuro stable. Vancomycin dc'd. Chest PT BID. ID consulted, cont zosyn.  11/6: BD 37. blood cx + klebsiella dc zosyn changed to cefepime, CTAP ordered, rpt blood cx sent.    11/7: BD 38. Pending CT A/P, given 250cc bolus and starting maintenance fluids overnight. Pending CT A/P after bolus   11/8: BD 39. CT CAP negative for infection.   11/9: BD 40. GEORGE overnight.  11/10: BD 41. GEORGE overnight. desat to 85 on trach collar, O2 inc to 10L and 100%, O2 sat inc to 95. pt tachy to 110s, euvolemic. given tylenol. ABG and CXR ordered. spiked fever, pancultured, RVP negative. AM ABG w pO2 79, rpt w pO2 79. pt appears comfortable, satting 94%.   11/11: BD 42. GEORGE overnight. pt became tachy to 130s, desat to 90 on 100% FiO2 and 10L. suctioned, (+) productive cough. temp 101.4, given 1g IV tylenol and 500cc NS bolus for euvolemia. fever and HR downtrending. LE dopplers negative for dvt  11/12: BD 43, GEORGE ovn, fever and HR downtrending, satting 97% 70% FIO2  11/13: BD 44, GEORGE ovn. started standing tylenol x24 hours for tachycardia. desat to 80s, o2 increased. CXR stable, pending CTA PE protocol.   11/14: BD 45, GEORGE overnight, neuro stable. resp therapy dec FiO2 to 70%.   11/15: BD 46, GEORGE overnight, neuro stable.  Rapid called for desaturation 30s, tachycardic 140s. Patient bagged, 100% fio2, heavily suctioned. CXR/POCUS unremarkable. ABG c/w desaturation. WBC 14.71. Afebrile. O2 improved to 90s and patient upgraded to ICU. ABG paO2 30s improved to 89 on vent. IV Tylenol x 1, sputum sent. Start protonix while o-n vent.   11/16: BD 47. POCUS showed collapsable IVCF, given 1L bolus. Vanco/Cefpime added empriic for PNA, NGT feeds restarted. MRSA swab neg, Vanc DC'd.   11/17: BD 48. GEORGE overnight. 1l bolus for tachycardia. Spiked to 101, cultured. 500cc bolus for tachycardia, tachy to 148 given 25mcg fentanyl, 250cc albumin, 1.5L bolus. 5 IV lopressor with response HR to 100s. +Stenotrophomonas on sputum cx.   11/18: BD 49. GEORGE overnight. Consulted ID, cefepime switched to bactrim x7days. Started hydrochlorothiazine 12.5mg daily.  11/19: BD 50. Tachy 120s, given tylenol and 500cc NS. Tolerating 5/5, switched to TCx. Holding phos binder. D/c Bactrim. D/c gabriel, f/u TOV. Dc'd PPI.   11/20: BD 51. GEORGE ovn. 1600 satting low 90s, mildly tachy to 110s, afebrile, RR wnl. O2 improved to mid 90s while inc O2 to 100% on TCx. CPAP 5/5 placed back on.  11/21: BD 52, GEORGE ovn, tolerating CPAP 5/5. Switch to trach collar during the day if tolerating well. HCTZ held for Cr bump, straight cath frequence increased to q4  11/22: BD 53, GEORGE ovn. Resumed phoslo. Gabriel placed. Resumed HCTZ.   11/23: BD 54. Holding tylenol in setting of possible fever, will require pan cx if febrile. Cr improved today. Cont CPAP. Bowel regimen held i/s/o diarrhea. FOBT negative.  11/24: BD 55. GEORGE overnight. Neuro stable. HCTZ dc'ed, started lisinopril 5. Lokelma dc'ed for K 3.7.   11/25: BD 56, GEORGE overnight. Neuro stable. dc'd lisinopril 5mg. Gabriel dc'd. TOV. 1545 noted to be hypotensive, MAP 50, in supine position on chair, HR 60s, afebrile, O2 96%. Given 1L cc NS bolus, placed back on bed in reverse trendelenberg, improved to map of 66. Neostick at bedside. Vitals check q1h. Dc'ed amlodipine. Failed TOV, bladder scan q6, sc prn. Added back Senna.   11/26: BD 57, GEORGE overnight. Neuro stable. Dc'd phoslo.   11/27: BD 58, GEORGE overnight. Neuro stable.    11/28: BD 59. Gabriel replaced.   11/29: GEORGE.  11/30: GEORGE, neuro stable.   12/1: BD 62, GEORGE overnight  12/2: BD 63, GEORGE overnight.; Given 1L bolus of LR for uptrending BUN/Cr.  12/3: BD 64. Reinstated eye gtt/moisture chamber given increased Rt eye injection  12/4: BD 65. GEORGE overnight. Attempted to speak with ophtho regarding eyelid weight/closure but no answer, full mailbox.   12/5: BD 66, GEORGE overnight, bowel regimen increased and had BM.   12/6: BD 67, GEORGE overnight, neuro stable.  12/7: GEORGE overnight, neuro stable. /110s, given x1 hydralazine 10 mg IVP. Restarted home amlodipine 5mg.  12/8: GEORGE. OOB to chair.     12/11: GEORGE, mucomyst added for thick secretions, simethicone for abd distension, abd xray with stool burden, increased bowel regimen.   12/12: GEORGE overnight.   12/13: GEORGE overnight.   12/14: GEORGE overnight  12/15: o/n Patient became tachycardic HR 120s and 10 minutes later O2 sat dropped as low as 89%. Patient suctioned without improvement in O2 sat and tube feeds found in suction catheter. TFs held.  STAT CXR ordered. STAT labs sent. Respiratory therapist called to bedside and patient trach connected to ventilator. After connection to ventilator and further suctioning O2 sat improved to 97% but patient HR remains 120-130s. Upgraded to NSICU for further management. Vancomycin and zosyn started. CTA  chest PE protocol and CTH ordered. Blood cultures sent.given 500cc bolus, rpt ABG sent pO2 243, CTH and CTA chest done. FS while NPO, FiO2 dec 50 pending ABG. sputum cx positive for few GPC and GNR.   12/16: GEORGE overnight, restarted amlodipine, troponin 75   12/17: GEORGE overnight, neuro stable. Attempted CPAP this morning, did not tolerate and back on full vent support. Dc'd Vanc. Tachycardia to 140s, noted extremities to be twitching along with jaw twitching. Given 2g of Keppra, total 4mg ativan and placed on EEG, full set of labs and lactate negative. Resumed trickle feeds at 20cc/hr. Given 250cc albumin for tachycardia.   12/18: GEORGE overnight. Neuro stable. CTH stable. EEG negative and dc'd.   12/19: GEORGE overnight. neuro stable. SIMV most of day, AC/VC at night.   12/20: GEORGE overnight, neuro stable. remains on VC/AC  12/21: GEORGE ovn. 1L bolus for tachy to 120s-130s.   12/22: GEORGE ovn. Tachy to 120s, given tylenol and IVF. 2mg IV ativan given for L jaw twitching. Sx resolved for 3 minutes and started again. Epilepsy contacted. Given 2mg IV ativan. Continued twitching. Increased keppra to 1500mg BID, 2mg ativan given. Propranolol started for refractory tachycardia. vEEG ordered. albumin given. POCUS performed, no b lines. Started on fosphenytoin, loaded w 20mg/kg then 100mg TID. febrile, pancx, started cefepime 2g q8, vancomycin  12/23: GEORGE ovn. +focal motor seizures on eeg. ID consulted. Vancomycin dc'ed as per ID.  12/24: GEORGE ovn, neuro stable. Baclofen 5 mg q12 started for hiccups. Na 129 from 134. Urine lytes c/w SIADH. Given 250cc 3% bolus. CT chest for infection w/u - f/u read. Repeat Na 136. UA negative  12/25: GEORGE ovn.   12/26: GEORGE ovn  12/27: GEORGE overnight. Blood cx neg @ 4 days, DC cefepime per medicine (sputum colonized).   12/28: Desaturation o/n to 80's, CXR obtained, pulse ox changed and sats resolved. Na 133 from 138, 250cc 3% bolus given. Cefepime resumed until 12/30 per ID. Rpt Na 138.  12/29: GEORGE overnight. Na stable.   12/30: GEORGE overnight. Family meeting with son, pt now DNR.   12/31: GEORGE overnight.   1/1: GEORGE overnight, neuro stable.  1/2: GEORGE overnight, neuro stable. FW decreased to 100q6.   1/3: GEORGE overnight, neuro stable. fosphenytoin IV changed to pheytoin PO via PEG per epilepsy recommendations  1/4: GEORGE overnight, neuro stable. FW incr. to 100q4  1/5: GEORGE overnight, neuro stable.   1/6: GEORGE overnight, neuro stable   1/7: GEORGE overnight, neuro stable. BUN/Cr increased, increased FW to 200q6. Given 1L bolus of LR over 2 hours. Gabriel d/c'd, voiding, continue bladder scan q6.   1/8: GEORGE overnight, neuro stable. BUN/Cr improving.  1/9: GEORGE overnight, neuro stable.   1/10: EGORGE overnight, neuro stable.   1/11: GEORGE overnight, neuro stable, vent settings stable.   1/12: GEORGE overnight, neuro stable. Febrile to 102F, f/u pan cx, chest xray, given tylenol. Zosyn started.   1/13: GEORGE overnight, neuro stable, cont Zosyn for presumed PNA, prelim sputum cx growing; few GS neg diplococci, mod GS neg rods, rare GS + rods, fever trend improved. Free water increased to [id number].   1/14: GEORGE overnight. pending renal US for elevated Cr  1/15: GEORGE ovn. renal US complete.   1/16: GEORGE overnight, neuro stable   1/17: GEORGE overnight, neuro stable     OVERNIGHT EVENTS: Pt seen and examined in bed, unable to offer any acute complaints at this time.     Vital Signs Last 24 Hrs  T(C): 35.8 (16 Jan 2025 21:00), Max: 36.5 (16 Jan 2025 17:00)  T(F): 96.4 (16 Jan 2025 21:00), Max: 97.7 (16 Jan 2025 17:00)  HR: 62 (17 Jan 2025 00:15) (50 - 76)  BP: 122/85 (17 Jan 2025 00:12) (117/86 - 135/95)  BP(mean): 100 (17 Jan 2025 00:12) (97 - 114)  RR: 17 (17 Jan 2025 00:12) (14 - 17)  SpO2: 99% (17 Jan 2025 00:15) (98% - 100%)    Parameters below as of 17 Jan 2025 00:12  Patient On (Oxygen Delivery Method): ventilator    O2 Concentration (%): 40    I&O's Summary    15 Sourav 2025 07:01  -  16 Jan 2025 07:00  --------------------------------------------------------  IN: 1440 mL / OUT: 1800 mL / NET: -360 mL    16 Jan 2025 07:01  -  17 Jan 2025 01:08  --------------------------------------------------------  IN: 820 mL / OUT: 1150 mL / NET: -330 mL        PHYSICAL EXAM:  Constitutional: Pt found in bed in NAD   ENT: PERRL, vertical EOMi  Respiratory: +trach,breathing non-labored, symmetrical chest wall movement  Cardiovascuar: RRR, no murmurs  Gastrointestinal: +PEG, abdomen soft, non tender  Neurological:  AAOX0. Verbal function not intact  Motor: RUE handgrip spontaneously, RLE wiggles toes, LUE 0/5, LLE withdraws  Pronator Drift: unable to assess  Wounds/Drains: N/A    TUBES/LINES:  [] Gabriel  [] Lumbar Drain  [] Wound Drains  [] Others      DIET:  [] NPO  [] Mechanical  [x] Tube feeds - via PEG     LABS:                        10.0   4.99  )-----------( 208      ( 16 Jan 2025 05:30 )             30.1     01-16    141  |  104  |  51[H]  ----------------------------<  111[H]  3.5   |  25  |  1.30    Ca    9.1      16 Jan 2025 05:30  Phos  3.6     01-16  Mg     2.1     01-16    TPro  6.5  /  Alb  3.3  /  TBili  0.2  /  DBili  x   /  AST  14  /  ALT  34  /  AlkPhos  163[H]  01-15      Urinalysis Basic - ( 16 Jan 2025 05:30 )    Color: x / Appearance: x / SG: x / pH: x  Gluc: 111 mg/dL / Ketone: x  / Bili: x / Urobili: x   Blood: x / Protein: x / Nitrite: x   Leuk Esterase: x / RBC: x / WBC x   Sq Epi: x / Non Sq Epi: x / Bacteria: x          CAPILLARY BLOOD GLUCOSE          Drug Levels: [] N/A    CSF Analysis: [] N/A      Allergies    Allergy Status Unknown    Intolerances      MEDICATIONS:  Antibiotics:  piperacillin/tazobactam IVPB.. 3.375 Gram(s) IV Intermittent every 8 hours    Neuro:  acetaminophen   Oral Liquid .. 650 milliGRAM(s) Oral every 6 hours PRN  baclofen 5 milliGRAM(s) Oral every 12 hours  levETIRAcetam 1500 milliGRAM(s) Oral two times a day  phenytoin   Suspension 100 milliGRAM(s) Oral every 8 hours    Anticoagulation:  heparin   Injectable 5000 Unit(s) SubCutaneous every 8 hours    OTHER:  acetylcysteine 10%  Inhalation 4 milliLiter(s) Inhalation every 6 hours  albuterol/ipratropium for Nebulization 3 milliLiter(s) Nebulizer every 6 hours  amLODIPine   Tablet 5 milliGRAM(s) Oral daily  artificial tears (preservative free) Ophthalmic Solution 1 Drop(s) Right EYE every 4 hours  chlorhexidine 0.12% Liquid 15 milliLiter(s) Oral Mucosa every 12 hours  chlorhexidine 2% Cloths 1 Application(s) Topical <User Schedule>  doxazosin 8 milliGRAM(s) Oral at bedtime  erythromycin   Ointment 1 Application(s) Right EYE at bedtime  fluticasone propionate 50 MICROgram(s)/spray Nasal Spray 1 Spray(s) Both Nostrils two times a day  influenza   Vaccine 0.5 milliLiter(s) IntraMuscular once  ofloxacin 0.3% Solution 1 Drop(s) Right EYE four times a day  pantoprazole  Injectable 40 milliGRAM(s) IV Push daily  petrolatum Ophthalmic Ointment 1 Application(s) Both EYES two times a day  propranolol 10 milliGRAM(s) Oral every 8 hours  sodium chloride 0.65% Nasal 1 Spray(s) Both Nostrils two times a day    IVF:    CULTURES:  Culture Results:   >=3 organisms. Probable collection contamination. (01-12 @ 15:25)  Culture Results:   No growth at 4 days (01-12 @ 12:50)    RADIOLOGY & ADDITIONAL TESTS:      ASSESSMENT:  44M PMH ?stroke/MI present to [facility] after collapsing at work. Decorticate posturing, vomiting, intubated for airway protection. 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 Transferred to [facility] for further management. s/p trach 10/14. s/p peg 10/21. Re-upgrade to ICU 2/2 desaturation event and suctioning requirements 11/15. Re-upgrade to NSICU 12/15 2/2 desaturation and tachycardia.    Neuro:  - neuro/vitals q4h  - pain control: tylenol prn  - seizure tx: keppra 1500mg BID, phenytoin 100mg PO TID  - vEEG (10/3-4) negative, (10/17-10/19) negative, (12/17-12/18) negative, (12/22-12/25 ) +focal motor seizures not correlating w/ EEG  - CTH 9/30: enlarged pontine hemorrhage, CTH 10/3: stable, CTH 10/25: mostly resolved pontine hemorrhage, CTH 12/15: R mastoid air cell opacification; acute otitis media vs sterile effusion, CTH 12/18: stable.  - MRI brain 10/12: parenchymal hemorrhage, acute/subacute R cerebellar stroke      CV:  - -160  - tachycardia: propranolol 10mg q8  - HTN: amlodipine 5mg  - echo (9/30) EF 75%, repeat 12/16: 57%    Resp:  - trach to vent, AC/VC 40/400/14/6  - Secretions: duonebs/mucomyst/chest PT q6h     GI:  - TF via PEG (placed 10/21 by gen surg)  - bowel regimen held for loose stool, last BM 1/15  - baclofen 5q12 for hiccups     Renal:  - IVL, FW 200q4 for hydration   - Urinary retenion: Gabriel removed 1/7, passed TOV  - CKD: trend BUN/Cr  - renal US 10/1: echogenicity c/w chronic med renal dz, repeat 10/8: inc renal echogeicity, c/f medical renal disease w increased hydro, Renal US completed 1/15: Increased bilateral renal echogenicity consistent with medical renal disease.    Endo:  - A1c 5.4    Heme:  - DVT ppx: SCDs, SQH 5000u q8h   - LE dopplers negative 12/16    ID:  - last pan cx 1/12, zosyn (1/12 -1/18 )  - empiric PNA: zosyn (1/12- ), cefepime (12/22-12/30), s/p vanc (12/22-12/23), s/p zosyn (12/15-12/20), s/p vanc (12/15-12/16)  - s/p Stenotrophomonas maltophilia PNA: s/p Bactrim (11/18-11/19) s/p Cefepime (11/16-11/18)  - 11/3, (+) sputum for stenotrophomas maltophlia, blood cx (+) klebsiella, cefepime 2gq12 (11/6 - 11/12)   - empiric tx: s/p zosyn (11/3-11/6) , s/p vanc  (11/3-11/5)  - S/p Ancef (10/4-10/14) for PNA, and s/p Unasyn (10/18-10/23) +actinobacter baumanii     MISC:  - ophtho consult for keratitis  - erythromycin ointment R eye q4hrs, ofloxacin ointment R eye QID, artificial tears R eye q2hrs, moisture chamber at bedtime    Dispo: SDU status, DNR, pending PRUCOL for benefits     D/w Dr. D'Amico

## 2025-01-17 NOTE — PROGRESS NOTE ADULT - ASSESSMENT
44M with unclear PMH (?stroke, MI) who was found down at work, intubated for airway protection and found to have acute parenchymal hemorrhage within nabil with mass effect (+ acute/subacute right cerebellar infarct) in setting of hypertensive emergency, transferred to [facility] for further neurosurgical care. Hospital course c/b poor neurologic recovery s/p trach-PEG, AUR s/p gabriel, ANIKA on CKD c/b hyperkalemia, HAP s/p amp-sulbactam (EOT 10/23), K aerogenes bacteremia  treated with 2 weeks of Cefepime per ID , On 11/15 he became septic and was transferred to NSICU due to increased o2 requirements and needed Vent support , Trach Cultures + for Stenotrophomonas which was treated with 7 days of Bactrim per ID , has been weaned of Vent since 11/23 and is now on 10lts at 40% o2 through Trach Collar. Stepped up to the NSICU on 12/15 for hypoxia and tachycardia. PE ruled out. On abx for 5 days. Unclear etiology. Now stepped down to 8Lach.    # Pontine hemorrhage  # Encephalopathy due to Intracranial Bleed   - Acute parenchymal hemorrhage within nabil with mass effect (+ acute/subacute right cerebellar infarct) in setting of hypertensive emergency.   - MRI brain also demonstrated acute/subacute R cerebellar stroke.   - No Neurosurgical Interventions were performed   - Secondary to IPH and cerebellar stroke patient has become Trach and PEG  Dependent    #Fever   -Continue  Zosyn , Will  stop antibiotics on 1/18   -Pt is afebrile and no leukocytosis   -Pt afebrile and no leukocytosis   -Pt's blood cx on 1/12/ was negative     #Acute kidney injury (RESOLVED)   # Acute Tubular Necrosis superimposed on CKD stage III  - s/p US renal with chronic medical renal disease  -  Continue Free water to 200 q4h. Will continue to monitor creatinine  -Monitor UOP, bladder scan per protocol.     # Seizures  - Continue Seizure precautions   - Continue  Keppra and Fosphenytoin     # Hypertension  - Continue amlodipine 5mg daily with holding parameters  - Will continue to monitor BP and titrate antihypertensive as required     # Chronic respiratory failure.   - S/p percutaneous trach by pulm on 10/14/24  - Currently on Vent Support   - Continue Chest PT    # Neurogenic dysphagia.   - Pt s/p PEG with surgery 10/21/24  - TF per nutrition  - aspiration precautions and elevation of HOB.    #Acute urinary retention.   - doxazosin 8mg  - continue to monitor urine oupt     # Functional quadriplegia in setting of brainstem hemorrhage  - decub precautions  - care per nursing protocol     # DVT prop: Heparin SQ q8  Discussed with primary team       55 minutes spent on total encounter. The necessity of the time spent during the encounter on this date of service was due to:    Review of hospital course, labs, vitals, medical records.  Bedside exam and interview of the patient  Discussed plan of care with primary team   Documenting the encounter.

## 2025-01-17 NOTE — PROGRESS NOTE ADULT - SUBJECTIVE AND OBJECTIVE BOX
Patient was seen and examined at bedside. Case discuss with the primary team. Pt w/o any events  overnight.     OBJECTIVE:  Vital Signs Last 24 Hrs  T(C): 36.6 (17 Jan 2025 09:16), Max: 36.9 (17 Jan 2025 04:43)  T(F): 97.9 (17 Jan 2025 09:16), Max: 98.4 (17 Jan 2025 04:43)  HR: 60 (17 Jan 2025 08:21) (56 - 66)  BP: 133/94 (17 Jan 2025 08:21) (117/86 - 135/95)  BP(mean): 108 (17 Jan 2025 08:21) (97 - 111)  RR: 18 (17 Jan 2025 08:21) (14 - 18)  SpO2: 100% (17 Jan 2025 08:21) (98% - 100%)    Parameters below as of 17 Jan 2025 08:21  Patient On (Oxygen Delivery Method): ventilator    O2 Concentration (%): 40      PHYSICAL EXAM:  General: unresponsive on ventilator, NAD, no labored breathing   HEENT: trach collar in place, clean  Lungs: anterior exam, limited, no crackles, no wheezes  Heart: regular  Abdomen: soft, no distension, + BS  Extremities:  warm, trace edema, not following commands     LABS:                        10.8   5.83  )-----------( 200      ( 17 Jan 2025 06:35 )             32.9     01-17    140  |  106  |  46[H]  ----------------------------<  102[H]  4.0   |  25  |  1.23    Ca    9.0      17 Jan 2025 06:35  Phos  3.4     01-17  Mg     2.1     01-17          CAPILLARY BLOOD GLUCOSE        Urinalysis Basic - ( 17 Jan 2025 06:35 )    Color: x / Appearance: x / SG: x / pH: x  Gluc: 102 mg/dL / Ketone: x  / Bili: x / Urobili: x   Blood: x / Protein: x / Nitrite: x   Leuk Esterase: x / RBC: x / WBC x   Sq Epi: x / Non Sq Epi: x / Bacteria: x        acetaminophen   Oral Liquid .. 650 milliGRAM(s) Oral every 6 hours PRN  acetylcysteine 10%  Inhalation 4 milliLiter(s) Inhalation every 6 hours  albuterol/ipratropium for Nebulization 3 milliLiter(s) Nebulizer every 6 hours  amLODIPine   Tablet 5 milliGRAM(s) Oral daily  artificial tears (preservative free) Ophthalmic Solution 1 Drop(s) Right EYE every 4 hours  baclofen 5 milliGRAM(s) Oral every 12 hours  chlorhexidine 0.12% Liquid 15 milliLiter(s) Oral Mucosa every 12 hours  chlorhexidine 2% Cloths 1 Application(s) Topical <User Schedule>  doxazosin 8 milliGRAM(s) Oral at bedtime  erythromycin   Ointment 1 Application(s) Right EYE at bedtime  fluticasone propionate 50 MICROgram(s)/spray Nasal Spray 1 Spray(s) Both Nostrils two times a day  heparin   Injectable 5000 Unit(s) SubCutaneous every 8 hours  influenza   Vaccine 0.5 milliLiter(s) IntraMuscular once  levETIRAcetam 1500 milliGRAM(s) Oral two times a day  ofloxacin 0.3% Solution 1 Drop(s) Right EYE four times a day  pantoprazole  Injectable 40 milliGRAM(s) IV Push daily  petrolatum Ophthalmic Ointment 1 Application(s) Both EYES two times a day  phenytoin   Suspension 100 milliGRAM(s) Oral every 8 hours  piperacillin/tazobactam IVPB.. 3.375 Gram(s) IV Intermittent every 8 hours  propranolol 10 milliGRAM(s) Oral every 8 hours  sodium chloride 0.65% Nasal 1 Spray(s) Both Nostrils two times a day

## 2025-01-18 LAB
ANION GAP SERPL CALC-SCNC: 12 MMOL/L — SIGNIFICANT CHANGE UP (ref 5–17)
BUN SERPL-MCNC: 44 MG/DL — HIGH (ref 7–23)
CALCIUM SERPL-MCNC: 9.1 MG/DL — SIGNIFICANT CHANGE UP (ref 8.4–10.5)
CHLORIDE SERPL-SCNC: 105 MMOL/L — SIGNIFICANT CHANGE UP (ref 96–108)
CO2 SERPL-SCNC: 23 MMOL/L — SIGNIFICANT CHANGE UP (ref 22–31)
CREAT SERPL-MCNC: 1.19 MG/DL — SIGNIFICANT CHANGE UP (ref 0.5–1.3)
EGFR: 76 ML/MIN/1.73M2 — SIGNIFICANT CHANGE UP
EGFR: 76 ML/MIN/1.73M2 — SIGNIFICANT CHANGE UP
GLUCOSE SERPL-MCNC: 101 MG/DL — HIGH (ref 70–99)
MAGNESIUM SERPL-MCNC: 2.3 MG/DL — SIGNIFICANT CHANGE UP (ref 1.6–2.6)
PHOSPHATE SERPL-MCNC: 4.2 MG/DL — SIGNIFICANT CHANGE UP (ref 2.5–4.5)
POTASSIUM SERPL-MCNC: 3.8 MMOL/L — SIGNIFICANT CHANGE UP (ref 3.5–5.3)
POTASSIUM SERPL-SCNC: 3.8 MMOL/L — SIGNIFICANT CHANGE UP (ref 3.5–5.3)
SODIUM SERPL-SCNC: 140 MMOL/L — SIGNIFICANT CHANGE UP (ref 135–145)

## 2025-01-18 PROCEDURE — 99233 SBSQ HOSP IP/OBS HIGH 50: CPT

## 2025-01-18 PROCEDURE — 99231 SBSQ HOSP IP/OBS SF/LOW 25: CPT

## 2025-01-18 RX ADMIN — OFLOXACIN 1 DROP(S): 3 SOLUTION OPHTHALMIC at 17:35

## 2025-01-18 RX ADMIN — HEPARIN SODIUM 5000 UNIT(S): 1000 INJECTION INTRAVENOUS; SUBCUTANEOUS at 05:02

## 2025-01-18 RX ADMIN — Medication 1 SPRAY(S): at 05:05

## 2025-01-18 RX ADMIN — Medication 15 MILLILITER(S): at 17:22

## 2025-01-18 RX ADMIN — Medication 1 DROP(S): at 10:31

## 2025-01-18 RX ADMIN — ACETYLCYSTEINE 4 MILLILITER(S): 200 INHALANT RESPIRATORY (INHALATION) at 11:44

## 2025-01-18 RX ADMIN — Medication 100 MILLIGRAM(S): at 00:39

## 2025-01-18 RX ADMIN — DOXAZOSIN MESYLATE 8 MILLIGRAM(S): 8 TABLET ORAL at 22:11

## 2025-01-18 RX ADMIN — IPRATROPIUM BROMIDE AND ALBUTEROL SULFATE 3 MILLILITER(S): .5; 2.5 SOLUTION RESPIRATORY (INHALATION) at 05:05

## 2025-01-18 RX ADMIN — LEVETIRACETAM 1500 MILLIGRAM(S): 10 INJECTION, SOLUTION INTRAVENOUS at 17:22

## 2025-01-18 RX ADMIN — FLUTICASONE PROPIONATE 1 SPRAY(S): 50 SPRAY, METERED NASAL at 17:37

## 2025-01-18 RX ADMIN — HEPARIN SODIUM 5000 UNIT(S): 1000 INJECTION INTRAVENOUS; SUBCUTANEOUS at 22:16

## 2025-01-18 RX ADMIN — BACLOFEN 5 MILLIGRAM(S): 10 INJECTION INTRATHECAL at 04:45

## 2025-01-18 RX ADMIN — ACETYLCYSTEINE 4 MILLILITER(S): 200 INHALANT RESPIRATORY (INHALATION) at 05:02

## 2025-01-18 RX ADMIN — Medication 100 MILLIGRAM(S): at 08:46

## 2025-01-18 RX ADMIN — Medication 25 GRAM(S): at 14:21

## 2025-01-18 RX ADMIN — Medication 1 SPRAY(S): at 17:36

## 2025-01-18 RX ADMIN — OFLOXACIN 1 DROP(S): 3 SOLUTION OPHTHALMIC at 05:37

## 2025-01-18 RX ADMIN — Medication 1 APPLICATION(S): at 05:37

## 2025-01-18 RX ADMIN — Medication 1 DROP(S): at 17:23

## 2025-01-18 RX ADMIN — HEPARIN SODIUM 5000 UNIT(S): 1000 INJECTION INTRAVENOUS; SUBCUTANEOUS at 14:19

## 2025-01-18 RX ADMIN — Medication 1 DROP(S): at 22:16

## 2025-01-18 RX ADMIN — Medication 25 GRAM(S): at 22:10

## 2025-01-18 RX ADMIN — Medication 1 DROP(S): at 14:20

## 2025-01-18 RX ADMIN — ACETYLCYSTEINE 4 MILLILITER(S): 200 INHALANT RESPIRATORY (INHALATION) at 00:39

## 2025-01-18 RX ADMIN — Medication 25 GRAM(S): at 05:39

## 2025-01-18 RX ADMIN — Medication 1 APPLICATION(S): at 05:38

## 2025-01-18 RX ADMIN — Medication 1 DROP(S): at 05:02

## 2025-01-18 RX ADMIN — Medication 15 MILLILITER(S): at 05:07

## 2025-01-18 RX ADMIN — IPRATROPIUM BROMIDE AND ALBUTEROL SULFATE 3 MILLILITER(S): .5; 2.5 SOLUTION RESPIRATORY (INHALATION) at 11:42

## 2025-01-18 RX ADMIN — Medication 100 MILLIGRAM(S): at 17:22

## 2025-01-18 RX ADMIN — AMLODIPINE BESYLATE 5 MILLIGRAM(S): 10 TABLET ORAL at 05:05

## 2025-01-18 RX ADMIN — BACLOFEN 5 MILLIGRAM(S): 10 INJECTION INTRATHECAL at 17:22

## 2025-01-18 RX ADMIN — OFLOXACIN 1 DROP(S): 3 SOLUTION OPHTHALMIC at 00:06

## 2025-01-18 RX ADMIN — ERYTHROMYCIN 1 APPLICATION(S): 5 OINTMENT OPHTHALMIC at 22:16

## 2025-01-18 RX ADMIN — Medication 1 APPLICATION(S): at 17:38

## 2025-01-18 RX ADMIN — Medication 650 MILLIGRAM(S): at 22:17

## 2025-01-18 RX ADMIN — LEVETIRACETAM 1500 MILLIGRAM(S): 10 INJECTION, SOLUTION INTRAVENOUS at 05:05

## 2025-01-18 RX ADMIN — OFLOXACIN 1 DROP(S): 3 SOLUTION OPHTHALMIC at 11:46

## 2025-01-18 RX ADMIN — Medication 1 DROP(S): at 02:04

## 2025-01-18 RX ADMIN — Medication 40 MILLIGRAM(S): at 11:44

## 2025-01-18 RX ADMIN — FLUTICASONE PROPIONATE 1 SPRAY(S): 50 SPRAY, METERED NASAL at 05:37

## 2025-01-18 RX ADMIN — IPRATROPIUM BROMIDE AND ALBUTEROL SULFATE 3 MILLILITER(S): .5; 2.5 SOLUTION RESPIRATORY (INHALATION) at 17:23

## 2025-01-18 RX ADMIN — ACETYLCYSTEINE 4 MILLILITER(S): 200 INHALANT RESPIRATORY (INHALATION) at 17:23

## 2025-01-18 RX ADMIN — IPRATROPIUM BROMIDE AND ALBUTEROL SULFATE 3 MILLILITER(S): .5; 2.5 SOLUTION RESPIRATORY (INHALATION) at 00:39

## 2025-01-18 RX ADMIN — Medication 650 MILLIGRAM(S): at 22:40

## 2025-01-18 NOTE — PROGRESS NOTE ADULT - ASSESSMENT
44M with unclear PMH (?stroke, MI) who was found down at work, intubated for airway protection and found to have acute parenchymal hemorrhage within nabil with mass effect (+ acute/subacute right cerebellar infarct) in setting of hypertensive emergency, transferred to [facility] for further neurosurgical care. Hospital course c/b poor neurologic recovery s/p trach-PEG, AUR s/p gabriel, ANIKA on CKD c/b hyperkalemia, HAP s/p amp-sulbactam (EOT 10/23), K aerogenes bacteremia  treated with 2 weeks of Cefepime per ID , On 11/15 he became septic and was transferred to NSICU due to increased o2 requirements and needed Vent support , Trach Cultures + for Stenotrophomonas which was treated with 7 days of Bactrim per ID , has been weaned of Vent since 11/23 and is now on 10lts at 40% o2 through Trach Collar. Stepped up to the NSICU on 12/15 for hypoxia and tachycardia. PE ruled out. On abx for 5 days. Unclear etiology. Now stepped down to 8[facility]ach.    # Pontine hemorrhage  # Encephalopathy due to Intracranial Bleed   - Acute parenchymal hemorrhage within nabil with mass effect (+ acute/subacute right cerebellar infarct) in setting of hypertensive emergency.   - MRI brain also demonstrated acute/subacute R cerebellar stroke.   - No Neurosurgical Interventions were performed   - Secondary to IPH and cerebellar stroke patient has become Trach and PEG  Dependent    #Fever   -Pt completes Zosyn today  1/18   -Pt is afebrile and no leukocytosis   -Pt's blood cx on 1/12/25 was negative     #Acute kidney injury (RESOLVED)   # Acute Tubular Necrosis superimposed on CKD stage III  - Pt s/p US renal with chronic medical renal disease  -  Continue Free water to 200 q4h. Will continue to monitor creatinine  -Monitor UOP, bladder scan per protocol.     # Seizures  - Continue Seizure precautions   - Continue  Keppra and Fosphenytoin     # Hypertension  - Continue amlodipine 5mg daily with holding parameters  - Will continue to monitor BP and titrate antihypertensive as required     # Chronic respiratory failure.   - S/p percutaneous trach by pulm on 10/14/24  - Currently on Vent Support   - Continue Chest PT    # Neurogenic dysphagia.   - Pt s/p PEG with surgery 10/21/24  - TF per nutrition  - aspiration precautions and elevation of HOB.    #Acute urinary retention.   - doxazosin 8mg  - continue to monitor urine oupt     # Functional quadriplegia in setting of brainstem hemorrhage  - decub precautions  - care per nursing protocol     # DVT prop: Heparin SQ q8  Discussed with primary team       55 minutes spent on total encounter. The necessity of the time spent during the encounter on this date of service was due to:    Review of hospital course, labs, vitals, medical records.  Bedside exam and interview of the patient  Discussed plan of care with primary team   Documenting the encounter.

## 2025-01-18 NOTE — PROGRESS NOTE ADULT - SUBJECTIVE AND OBJECTIVE BOX
Patient was seen and examined at bedside. Case discuss with the primary team. Pt without any events overnight.     OBJECTIVE:  Vital Signs Last 24 Hrs  T(C): 36.4 (18 Jan 2025 09:38), Max: 36.8 (18 Jan 2025 05:08)  T(F): 97.6 (18 Jan 2025 09:38), Max: 98.2 (18 Jan 2025 05:08)  HR: 58 (18 Jan 2025 09:08) (58 - 92)  BP: 132/91 (18 Jan 2025 08:37) (110/71 - 145/101)  BP(mean): 106 (18 Jan 2025 08:37) (85 - 119)  RR: 15 (18 Jan 2025 09:08) (14 - 18)  SpO2: 100% (18 Jan 2025 09:08) (94% - 100%)    Parameters below as of 18 Jan 2025 09:08  Patient On (Oxygen Delivery Method): ventilator    O2 Concentration (%): 40    PHYSICAL EXAM:  General: minimally responsive on ventilator, NAD, no labored breathing   HEENT: trach collar in place, clean  Lungs: anterior exam, limited, no crackles, no wheezes  Heart: regular  Abdomen: soft, no distension, + BS  Extremities:  warm, trace edema, not following commands       LABS:                        10.8   5.83  )-----------( 200      ( 17 Jan 2025 06:35 )             32.9     01-18    140  |  105  |  44[H]  ----------------------------<  101[H]  3.8   |  23  |  1.19    Ca    9.1      18 Jan 2025 08:30  Phos  4.2     01-18  Mg     2.3     01-18      acetaminophen   Oral Liquid .. 650 milliGRAM(s) Oral every 6 hours PRN  acetylcysteine 10%  Inhalation 4 milliLiter(s) Inhalation every 6 hours  albuterol/ipratropium for Nebulization 3 milliLiter(s) Nebulizer every 6 hours  amLODIPine   Tablet 5 milliGRAM(s) Oral daily  artificial tears (preservative free) Ophthalmic Solution 1 Drop(s) Right EYE every 4 hours  baclofen 5 milliGRAM(s) Oral every 12 hours  chlorhexidine 0.12% Liquid 15 milliLiter(s) Oral Mucosa every 12 hours  chlorhexidine 2% Cloths 1 Application(s) Topical <User Schedule>  doxazosin 8 milliGRAM(s) Oral at bedtime  erythromycin   Ointment 1 Application(s) Right EYE at bedtime  fluticasone propionate 50 MICROgram(s)/spray Nasal Spray 1 Spray(s) Both Nostrils two times a day  heparin   Injectable 5000 Unit(s) SubCutaneous every 8 hours  influenza   Vaccine 0.5 milliLiter(s) IntraMuscular once  levETIRAcetam 1500 milliGRAM(s) Oral two times a day  ofloxacin 0.3% Solution 1 Drop(s) Right EYE four times a day  pantoprazole  Injectable 40 milliGRAM(s) IV Push daily  petrolatum Ophthalmic Ointment 1 Application(s) Both EYES two times a day  phenytoin   Suspension 100 milliGRAM(s) Oral every 8 hours  piperacillin/tazobactam IVPB.. 3.375 Gram(s) IV Intermittent every 8 hours  propranolol 10 milliGRAM(s) Oral every 8 hours  sodium chloride 0.65% Nasal 1 Spray(s) Both Nostrils two times a day

## 2025-01-19 PROCEDURE — 99233 SBSQ HOSP IP/OBS HIGH 50: CPT

## 2025-01-19 PROCEDURE — 99231 SBSQ HOSP IP/OBS SF/LOW 25: CPT

## 2025-01-19 RX ADMIN — HEPARIN SODIUM 5000 UNIT(S): 1000 INJECTION INTRAVENOUS; SUBCUTANEOUS at 05:28

## 2025-01-19 RX ADMIN — ACETYLCYSTEINE 4 MILLILITER(S): 200 INHALANT RESPIRATORY (INHALATION) at 05:29

## 2025-01-19 RX ADMIN — Medication 1 DROP(S): at 22:48

## 2025-01-19 RX ADMIN — Medication 15 MILLILITER(S): at 05:07

## 2025-01-19 RX ADMIN — LEVETIRACETAM 1500 MILLIGRAM(S): 10 INJECTION, SOLUTION INTRAVENOUS at 05:08

## 2025-01-19 RX ADMIN — FLUTICASONE PROPIONATE 1 SPRAY(S): 50 SPRAY, METERED NASAL at 05:10

## 2025-01-19 RX ADMIN — Medication 15 MILLILITER(S): at 17:12

## 2025-01-19 RX ADMIN — Medication 1 SPRAY(S): at 17:01

## 2025-01-19 RX ADMIN — Medication 1 DROP(S): at 14:30

## 2025-01-19 RX ADMIN — OFLOXACIN 1 DROP(S): 3 SOLUTION OPHTHALMIC at 05:28

## 2025-01-19 RX ADMIN — OFLOXACIN 1 DROP(S): 3 SOLUTION OPHTHALMIC at 00:45

## 2025-01-19 RX ADMIN — Medication 1 APPLICATION(S): at 05:29

## 2025-01-19 RX ADMIN — Medication 100 MILLIGRAM(S): at 01:40

## 2025-01-19 RX ADMIN — ACETYLCYSTEINE 4 MILLILITER(S): 200 INHALANT RESPIRATORY (INHALATION) at 11:29

## 2025-01-19 RX ADMIN — BACLOFEN 5 MILLIGRAM(S): 10 INJECTION INTRATHECAL at 17:03

## 2025-01-19 RX ADMIN — BACLOFEN 5 MILLIGRAM(S): 10 INJECTION INTRATHECAL at 04:31

## 2025-01-19 RX ADMIN — DOXAZOSIN MESYLATE 8 MILLIGRAM(S): 8 TABLET ORAL at 22:49

## 2025-01-19 RX ADMIN — FLUTICASONE PROPIONATE 1 SPRAY(S): 50 SPRAY, METERED NASAL at 17:02

## 2025-01-19 RX ADMIN — Medication 1 SPRAY(S): at 05:10

## 2025-01-19 RX ADMIN — Medication 100 MILLIGRAM(S): at 10:21

## 2025-01-19 RX ADMIN — IPRATROPIUM BROMIDE AND ALBUTEROL SULFATE 3 MILLILITER(S): .5; 2.5 SOLUTION RESPIRATORY (INHALATION) at 17:12

## 2025-01-19 RX ADMIN — HEPARIN SODIUM 5000 UNIT(S): 1000 INJECTION INTRAVENOUS; SUBCUTANEOUS at 22:48

## 2025-01-19 RX ADMIN — Medication 1 APPLICATION(S): at 05:28

## 2025-01-19 RX ADMIN — Medication 1 DROP(S): at 01:45

## 2025-01-19 RX ADMIN — ERYTHROMYCIN 1 APPLICATION(S): 5 OINTMENT OPHTHALMIC at 22:48

## 2025-01-19 RX ADMIN — LEVETIRACETAM 1500 MILLIGRAM(S): 10 INJECTION, SOLUTION INTRAVENOUS at 17:03

## 2025-01-19 RX ADMIN — HEPARIN SODIUM 5000 UNIT(S): 1000 INJECTION INTRAVENOUS; SUBCUTANEOUS at 14:15

## 2025-01-19 RX ADMIN — Medication 40 MILLIGRAM(S): at 11:29

## 2025-01-19 RX ADMIN — IPRATROPIUM BROMIDE AND ALBUTEROL SULFATE 3 MILLILITER(S): .5; 2.5 SOLUTION RESPIRATORY (INHALATION) at 11:28

## 2025-01-19 RX ADMIN — Medication 1 DROP(S): at 05:10

## 2025-01-19 RX ADMIN — Medication 100 MILLIGRAM(S): at 17:03

## 2025-01-19 RX ADMIN — Medication 1 DROP(S): at 17:00

## 2025-01-19 RX ADMIN — OFLOXACIN 1 DROP(S): 3 SOLUTION OPHTHALMIC at 11:32

## 2025-01-19 RX ADMIN — Medication 1 DROP(S): at 10:04

## 2025-01-19 RX ADMIN — ACETYLCYSTEINE 4 MILLILITER(S): 200 INHALANT RESPIRATORY (INHALATION) at 17:11

## 2025-01-19 RX ADMIN — ACETYLCYSTEINE 4 MILLILITER(S): 200 INHALANT RESPIRATORY (INHALATION) at 01:40

## 2025-01-19 RX ADMIN — IPRATROPIUM BROMIDE AND ALBUTEROL SULFATE 3 MILLILITER(S): .5; 2.5 SOLUTION RESPIRATORY (INHALATION) at 01:40

## 2025-01-19 RX ADMIN — AMLODIPINE BESYLATE 5 MILLIGRAM(S): 10 TABLET ORAL at 05:09

## 2025-01-19 RX ADMIN — Medication 1 APPLICATION(S): at 17:10

## 2025-01-19 RX ADMIN — IPRATROPIUM BROMIDE AND ALBUTEROL SULFATE 3 MILLILITER(S): .5; 2.5 SOLUTION RESPIRATORY (INHALATION) at 05:10

## 2025-01-19 RX ADMIN — OFLOXACIN 1 DROP(S): 3 SOLUTION OPHTHALMIC at 17:10

## 2025-01-19 NOTE — PROGRESS NOTE ADULT - SUBJECTIVE AND OBJECTIVE BOX
Patient was seen and examined at bedside. Case discuss with the primary team. Pt was bradycardiac overnight therefore his propranolol was held.     OBJECTIVE:  Vital Signs Last 24 Hrs  T(C): 36.4 (19 Jan 2025 09:06), Max: 36.8 (18 Jan 2025 22:03)  T(F): 97.5 (19 Jan 2025 09:06), Max: 98.3 (18 Jan 2025 22:03)  HR: 55 (19 Jan 2025 09:13) (54 - 80)  BP: 143/102 (19 Jan 2025 08:16) (124/81 - 152/102)  BP(mean): 119 (19 Jan 2025 08:16) (97 - 123)  RR: 14 (19 Jan 2025 09:13) (14 - 16)  SpO2: 100% (19 Jan 2025 09:13) (97% - 100%)    Parameters below as of 19 Jan 2025 09:13  Patient On (Oxygen Delivery Method): ventilator    O2 Concentration (%): 40    PHYSICAL EXAM:  General: minimally responsive on ventilator, NAD, no labored breathing   HEENT: trach collar in place, clean  Lungs: anterior exam, limited, no crackles, no wheezes  Heart: regular  Abdomen: soft, no distension, + BS  Extremities:  warm, trace edema, not following commands     LABS:    01-18    140  |  105  |  44[H]  ----------------------------<  101[H]  3.8   |  23  |  1.19    Ca    9.1      18 Jan 2025 08:30  Phos  4.2     01-18  Mg     2.3     01-18      acetaminophen   Oral Liquid .. 650 milliGRAM(s) Oral every 6 hours PRN  acetylcysteine 10%  Inhalation 4 milliLiter(s) Inhalation every 6 hours  albuterol/ipratropium for Nebulization 3 milliLiter(s) Nebulizer every 6 hours  amLODIPine   Tablet 5 milliGRAM(s) Oral daily  artificial tears (preservative free) Ophthalmic Solution 1 Drop(s) Right EYE every 4 hours  baclofen 5 milliGRAM(s) Oral every 12 hours  chlorhexidine 0.12% Liquid 15 milliLiter(s) Oral Mucosa every 12 hours  chlorhexidine 2% Cloths 1 Application(s) Topical <User Schedule>  doxazosin 8 milliGRAM(s) Oral at bedtime  erythromycin   Ointment 1 Application(s) Right EYE at bedtime  fluticasone propionate 50 MICROgram(s)/spray Nasal Spray 1 Spray(s) Both Nostrils two times a day  heparin   Injectable 5000 Unit(s) SubCutaneous every 8 hours  influenza   Vaccine 0.5 milliLiter(s) IntraMuscular once  levETIRAcetam 1500 milliGRAM(s) Oral two times a day  ofloxacin 0.3% Solution 1 Drop(s) Right EYE four times a day  pantoprazole  Injectable 40 milliGRAM(s) IV Push daily  petrolatum Ophthalmic Ointment 1 Application(s) Both EYES two times a day  phenytoin   Suspension 100 milliGRAM(s) Oral every 8 hours  propranolol 5 milliGRAM(s) Oral every 8 hours  sodium chloride 0.65% Nasal 1 Spray(s) Both Nostrils two times a day

## 2025-01-19 NOTE — PROGRESS NOTE ADULT - SUBJECTIVE AND OBJECTIVE BOX
HPI:  Unknown age male, unknown past medical history (reported stroke and MI by coworkers) presented to [facility] with AMS, Pt was working at [employer] when he was found down by coworkers. EMS called and pt brought to [facility] ED. Intubated, sedated, started on cardene for SBPs in 200s. CT head showed brain stem bleed. Transferred to NSICU for further management.  (30 Sep 2024 12:55)    HOSPITAL COURSE:  9/30: transferred from [facility]. A line placed. Versed dc'd. Cy Rader at bedside, states pt has family in [place], cannot confirm medications or PMH other than stroke and MI. 250cc bolus 3% given. LR switched to NS. hydralazine 25q8 started, 3% started, switched propofol to precedex   10/1: stability CTH done. Added labetalol, started TF. Palliative consulted. ethics consulted to determine surrogate. febrile 103, pan cx sent  10/2: BD 2, GEORGE overnight. TF resumed. Desatt'd to 80s, FiO2 inc. to 50. Fentanyl given, ABG, CXR ordered. Maxxed on precedex, started on propofol for DARIEN -4 - -5. Precedex dc'd. Duonebs, mucomyst, hypertonic added. 3% dc'd. Cardene dc'd. Start vanc/CTX. Increased labetalol 200q8. MRSA negative, dc'd vanc. ETT pulled back 2cm x 2, good positioning after confirmatory chest xray. Ethics attempting to establish HCP with family. Na 159, starting FW 250q6 for range 150-155.   10/3: BD3, GEORGE o/n, neuro stable. Na elevating, FW increased to 300q6. Dc'd bowel reg for diarrhea. vEEG started. SQH 5000q8 tonight.   10/4: BD 4, albumn bolus, incr. LR to 80 2/2 incr. in Cr, LR to 10 0cc/hr for uptrending Cr. Started 7% hypersal for 48hrs and SL atropine for inline/oral thick secretions. Dc'd CTX and started ancef for MSSA in the sputum. Nephrology consulted for CKD, f/u recs. SBP 170s, given hydralazine 10mg IVP.   10/5: BD5, o/n 10mg IVP hydralazine given for SBP 170s and started on hydralazine 25q8 via OGT. 10mg IV push labetalol for SBP > 160s. RT placed for diarrhea.   10/6: BD6, o/n FW increased to 350q4 per nephrology recs. IV tylenol for temp 100.6, SBp 160s presumed uncomfortable.   10/7: BD7, overnight pancultured for temp 101.8F.   10/8: BD8. GEORGE. Cr bumped. decreased LR to 75cc/hr. Adding simethicone ATC. incr hydralazine 50mgTID. Incr labetalol 300mgTID. Na 145, decreased FWF to 250q6. Start precedex. FENa consistent with intrinsic kidney injury. Pend repeat renal US. Retaining up to 1.3L, bladder scans q6, straight cath PRN  10/9: BD 9. GEORGE overnight. Neuro stable. abd xray for distention w non-specific gas pattern, OGT to LIWS for morning. duonebs/mucomyst to q8 for improving secretions. Changed tube feeds to Jevity 1.5 20cc/hr, low rate due to abdominal distention, nepro dense and more difficult to digest. Tolerating CPAP, confirmed by ABG.   10/10: BD 10. GEORGE overnight. Neuro stable. (+) gabriel for urinary retention on bladder scan. inc TF to goal rate of 40cc/hr. family leaning toward pursuing trach/PEG. 1/2 amp for FS 81.   10/11: BD 11. GEORGE overnight. Neuro stable. Trach/PEG consults placed.   10/12: BD 12. GEORGE overnight. Neuro stable. MRI brain complete.   10/13: BD 13. Increase flomax. Hold SQH after PM dose for trach tm. IVL.   10/14: BD 14. GEORGE overnight, remains on AC/VC. Gabriel placed for urinary retention. Dc'd free water.  S/p trach with pulm. NGT placed and CXR confirmed in good position.   10/15: BD 15, GEORGE ovn. resumed feeds. spiked 101, pan cx sent.   10/16: BD 16. GEORGE ovn. Lokelma 5mg for K+ 5.4. Started vanc q 24/zosyn for empiric PNA coverage, IVF to 100/hr. PEG held for fever.   10/17: BD 17,  ordered serum osm and urine osm for am. Started sinemet for neurostimulation. Increased cardura to 0.8. Started FW 100q4, dc'd IVF. MRSA negative, dc'd vanc. NGT replaced d/t coiling.   10/18: BD 18, GEORGE overnight, neuro stable. Amantadine added for neurostim. zosyn changed to unasyn for acinetobacter baumannii, failed TOV and required SC  10/19: BD 19, GEORGE ovn. cardura 2mg added for retention. labetalol decreased 200q8, hydralazine decreased 25q8. Gabriel replaced.   10/20: BD20, GEORGE overnight. NGT dislodged, replaced. PEG tomorrow w/ gen surg, FW increased to 150q4 and labetalol decreased to 100q8, lokelma given for hyperkalemia.   10/21: BD 21. POD0 PEG placement with Gen surg. decr labetolol to 50q8, incr. cardura to 0.4, started lokelma and phoslo, dc gabriel POD0 PEG placement with Gen surg.  10/22: BD 22. Plan to start TF today via PEG. dc labetalol, Following ophtho recs. Increased apnea settings - found to be in cheyne-moe respiration. CPAP 5/5.  10/23: BD 23. hydralazine d/c'd, trach collar trial today. Rectal tube placed at 6am.  10/24: BD24, o/n lokelma held due to diarrhea. Free water 100q6 resumed. dc'd tamsulosin, amantadine. Incr'd cardura to 8mg qhs. Dc'd FW. Switched jevity to nepro. gabriel placed for high urine output. Started SL atropine for oral secretions. Dc'd free water.  10/25: BD25, o/n decreased suctioning requirements to > q4hrs, GEORGE. Cr improving, cont phoslo, lokelma held at this time. Gabriel placed yest, cont. Tolerating trach collar. Given 500cc plasmalyte bolus for ANIKA. Dc'd sinemet.   10/26: BD26, o/n resumed lokelma 5mg daily and resumed 100cc free water q6hrs. Change in neuro status with new right pupillary dilation with anisocoria (right pupil 6mm fixed and left pupil 3mm briskly reactive). Given 23.4% NaCl bullet, taken for emergent CTH showing mostly resolved pontine hemorrhage, continued brainstem hypodensity likely edema d/t hemorrhage, no new hemorrhage or infarct, no herniation, mild increase in size of left lateral ventricle. Vitals remaine stable. Na goal > 140.   10/27: BD27, o/n GEORGE.Neuro stable. Pend stepdown with airway bed.   10/28: BD 28. GEORGE overnight. Neuro stable. Miralax ordered. Gabriel removed, pending TOV.  10/29: BD 29. GEORGE o/n. Given 2L NS over 8 hrs for increased BUN/Cr ratio. Gabriel placed for frequent straight cath.   10/30: BD 30.   10/31: BD 31. GEORGE overnight. Na 149, increased free water to 200q6. 1L NS for uptrending BUN.   11/1: BD 32. GEORGE overnight. Given 1L NS for dehydration. Na 146, increased FW to 250q6.   11/2: BD 33 GEORGE overnight, neuro stable, given 500cc bolus for net negative status and tachycardia   11/3: BD 34, GEORGE overnight, neuro stable. Patient remains tachycardic, EKG showing sinus tachycardia, given additional 500cc NS bolus. Febrile to 101.9F, pan cultured (without UA), CXR WNL, given tylenol.   11/4: BD 35, GEORGE overnight, neuro stable. Given 1L NS for tachycardia. sputum (+) for stenotropohomas maltophilia.   11/5: BD 36 GEORGE overnight, neuro stable. Vancomycin dc'd. Chest PT BID. ID consulted, cont zosyn.  11/6: BD 37. blood cx + klebsiella dc zosyn changed to cefepime, CTAP ordered, rpt blood cx sent.    11/7: BD 38. Pending CT A/P, given 250cc bolus and starting maintenance fluids overnight. Pending CT A/P after bolus   11/8: BD 39. CT CAP negative for infection.   11/9: BD 40. GEORGE overnight.  11/10: BD 41. GEORGE overnight. desat to 85 on trach collar, O2 inc to 10L and 100%, O2 sat inc to 95. pt tachy to 110s, euvolemic. given tylenol. ABG and CXR ordered. spiked fever, pancultured, RVP negative. AM ABG w pO2 79, rpt w pO2 79. pt appears comfortable, satting 94%.   11/11: BD 42. GEORGE overnight. pt became tachy to 130s, desat to 90 on 100% FiO2 and 10L. suctioned, (+) productive cough. temp 101.4, given 1g IV tylenol and 500cc NS bolus for euvolemia. fever and HR downtrending. LE dopplers negative for dvt  11/12: BD 43, GEORGE ovn, fever and HR downtrending, satting 97% 70% FIO2  11/13: BD 44, GEORGE ovn. started standing tylenol x24 hours for tachycardia. desat to 80s, o2 increased. CXR stable, pending CTA PE protocol.   11/14: BD 45, GEORGE overnight, neuro stable. resp therapy dec FiO2 to 70%.   11/15: BD 46, GEORGE overnight, neuro stable.  Rapid called for desaturation 30s, tachycardic 140s. Patient bagged, 100% fio2, heavily suctioned. CXR/POCUS unremarkable. ABG c/w desaturation. WBC 14.71. Afebrile. O2 improved to 90s and patient upgraded to ICU. ABG paO2 30s improved to 89 on vent. IV Tylenol x 1, sputum sent. Start protonix while o-n vent.   11/16: BD 47. POCUS showed collapsable IVCF, given 1L bolus. Vanco/Cefpime added empriic for PNA, NGT feeds restarted. MRSA swab neg, Vanc DC'd.   11/17: BD 48. GEORGE overnight. 1l bolus for tachycardia. Spiked to 101, cultured. 500cc bolus for tachycardia, tachy to 148 given 25mcg fentanyl, 250cc albumin, 1.5L bolus. 5 IV lopressor with response HR to 100s. +Stenotrophomonas on sputum cx.   11/18: BD 49. GEORGE overnight. Consulted ID, cefepime switched to bactrim x7days. Started hydrochlorothiazine 12.5mg daily.  11/19: BD 50. Tachy 120s, given tylenol and 500cc NS. Tolerating 5/5, switched to TCx. Holding phos binder. D/c Bactrim. D/c gabriel, f/u TOV. Dc'd PPI.   11/20: BD 51. GEORGE ovn. 1600 satting low 90s, mildly tachy to 110s, afebrile, RR wnl. O2 improved to mid 90s while inc O2 to 100% on TCx. CPAP 5/5 placed back on.  11/21: BD 52, GEORGE ovn, tolerating CPAP 5/5. Switch to trach collar during the day if tolerating well. HCTZ held for Cr bump, straight cath frequence increased to q4  11/22: BD 53, GEORGE ovn. Resumed phoslo. Gabriel placed. Resumed HCTZ.   11/23: BD 54. Holding tylenol in setting of possible fever, will require pan cx if febrile. Cr improved today. Cont CPAP. Bowel regimen held i/s/o diarrhea. FOBT negative.  11/24: BD 55. GEORGE overnight. Neuro stable. HCTZ dc'ed, started lisinopril 5. Lokelma dc'ed for K 3.7.   11/25: BD 56, GEORGE overnight. Neuro stable. dc'd lisinopril 5mg. Gabriel dc'd. TOV. 1545 noted to be hypotensive, MAP 50, in supine position on chair, HR 60s, afebrile, O2 96%. Given 1L cc NS bolus, placed back on bed in reverse trendelenberg, improved to map of 66. Neostick at bedside. Vitals check q1h. Dc'ed amlodipine. Failed TOV, bladder scan q6, sc prn. Added back Senna.   11/26: BD 57, GEORGE overnight. Neuro stable. Dc'd phoslo.   11/27: BD 58, GEORGE overnight. Neuro stable.    11/28: BD 59. Gabriel replaced.   11/29: GEORGE.  11/30: GEORGE, neuro stable.   12/1: BD 62, GEORGE overnight  12/2: BD 63, GEORGE overnight.; Given 1L bolus of LR for uptrending BUN/Cr.  12/3: BD 64. Reinstated eye gtt/moisture chamber given increased Rt eye injection  12/4: BD 65. GEORGE overnight. Attempted to speak with ophtho regarding eyelid weight/closure but no answer, full mailbox.   12/5: BD 66, GEORGE overnight, bowel regimen increased and had BM.   12/6: BD 67, GEORGE overnight, neuro stable.  12/7: GEORGE overnight, neuro stable. /110s, given x1 hydralazine 10 mg IVP. Restarted home amlodipine 5mg.  12/8: GEORGE. OOB to chair.     12/11: GEORGE, mucomyst added for thick secretions, simethicone for abd distension, abd xray with stool burden, increased bowel regimen.   12/12: GEORGE overnight.   12/13: GEORGE overnight.   12/14: GEORGE overnight  12/15: o/n Patient became tachycardic HR 120s and 10 minutes later O2 sat dropped as low as 89%. Patient suctioned without improvement in O2 sat and tube feeds found in suction catheter. TFs held.  STAT CXR ordered. STAT labs sent. Respiratory therapist called to bedside and patient trach connected to ventilator. After connection to ventilator and further suctioning O2 sat improved to 97% but patient HR remains 120-130s. Upgraded to NSICU for further management. Vancomycin and zosyn started. CTA  chest PE protocol and CTH ordered. Blood cultures sent.given 500cc bolus, rpt ABG sent pO2 243, CTH and CTA chest done. FS while NPO, FiO2 dec 50 pending ABG. sputum cx positive for few GPC and GNR.   12/16: GEORGE overnight, restarted amlodipine, troponin 75   12/17: GEORGE overnight, neuro stable. Attempted CPAP this morning, did not tolerate and back on full vent support. Dc'd Vanc. Tachycardia to 140s, noted extremities to be twitching along with jaw twitching. Given 2g of Keppra, total 4mg ativan and placed on EEG, full set of labs and lactate negative. Resumed trickle feeds at 20cc/hr. Given 250cc albumin for tachycardia.   12/18: GEORGE overnight. Neuro stable. CTH stable. EEG negative and dc'd.   12/19: GEORGE overnight. neuro stable. SIMV most of day, AC/VC at night.   12/20: GEORGE overnight, neuro stable. remains on VC/AC  12/21: GEORGE ovn. 1L bolus for tachy to 120s-130s.   12/22: GEORGE ovn. Tachy to 120s, given tylenol and IVF. 2mg IV ativan given for L jaw twitching. Sx resolved for 3 minutes and started again. Epilepsy contacted. Given 2mg IV ativan. Continued twitching. Increased keppra to 1500mg BID, 2mg ativan given. Propranolol started for refractory tachycardia. vEEG ordered. albumin given. POCUS performed, no b lines. Started on fosphenytoin, loaded w 20mg/kg then 100mg TID. febrile, pancx, started cefepime 2g q8, vancomycin  12/23: GEORGE ovn. +focal motor seizures on eeg. ID consulted. Vancomycin dc'ed as per ID.  12/24: GEORGE ovn, neuro stable. Baclofen 5 mg q12 started for hiccups. Na 129 from 134. Urine lytes c/w SIADH. Given 250cc 3% bolus. CT chest for infection w/u - f/u read. Repeat Na 136. UA negative  12/25: GEORGE ovn.   12/26: GEORGE ovn  12/27: GEORGE overnight. Blood cx neg @ 4 days, DC cefepime per medicine (sputum colonized).   12/28: Desaturation o/n to 80's, CXR obtained, pulse ox changed and sats resolved. Na 133 from 138, 250cc 3% bolus given. Cefepime resumed until 12/30 per ID. Rpt Na 138.  12/29: GEORGE overnight. Na stable.   12/30: GEORGE overnight. Family meeting with son, pt now DNR.   12/31-1/19: No acute events     OVERNIGHT EVENTS: GEORGE overnight     Vital Signs Last 24 Hrs  T(C): 36.8 (18 Jan 2025 22:03), Max: 36.8 (18 Jan 2025 05:08)  T(F): 98.3 (18 Jan 2025 22:03), Max: 98.3 (18 Jan 2025 22:03)  HR: 72 (18 Jan 2025 23:45) (54 - 80)  BP: 129/91 (18 Jan 2025 23:45) (110/71 - 152/102)  BP(mean): 106 (18 Jan 2025 23:45) (85 - 123)  RR: 15 (18 Jan 2025 23:45) (14 - 16)  SpO2: 98% (18 Jan 2025 23:45) (97% - 100%)    Parameters below as of 18 Jan 2025 23:45  Patient On (Oxygen Delivery Method): ventilator    O2 Concentration (%): 40    I&O's Summary    17 Jan 2025 07:01  -  18 Jan 2025 07:00  --------------------------------------------------------  IN: 2490 mL / OUT: 1950 mL / NET: 540 mL    18 Jan 2025 07:01  -  19 Jan 2025 00:54  --------------------------------------------------------  IN: 1800 mL / OUT: 1640 mL / NET: 160 mL    PHYSICAL EXAM:  Constitutional: Pt found in bed in NAD   ENT: PERRL, vertical EOMi  Respiratory: +trach, breathing non-labored, symmetrical chest wall movement  Cardiovascuar: RRR, no murmurs  Gastrointestinal: +PEG, abdomen soft, non tender  Neurological:  AAOX0. Verbal function not intact, looks down to command  Motor: RUE handgrip to command 2/5, RLE wiggles toes spontaneously, LUE 0/5, LLE withdraws to noxious   Pronator Drift: unable to assess    DIET:  [] NPO  [] Mechanical  [X] Tube feeds    LABS:                        10.8   5.83  )-----------( 200      ( 17 Jan 2025 06:35 )             32.9     01-18    140  |  105  |  44[H]  ----------------------------<  101[H]  3.8   |  23  |  1.19    Ca    9.1      18 Jan 2025 08:30  Phos  4.2     01-18  Mg     2.3     01-18    Urinalysis Basic - ( 18 Jan 2025 08:30 )    Color: x / Appearance: x / SG: x / pH: x  Gluc: 101 mg/dL / Ketone: x  / Bili: x / Urobili: x   Blood: x / Protein: x / Nitrite: x   Leuk Esterase: x / RBC: x / WBC x   Sq Epi: x / Non Sq Epi: x / Bacteria: x    Allergies    Allergy Status Unknown    Intolerances      MEDICATIONS:  Antibiotics:    Neuro:  acetaminophen   Oral Liquid .. 650 milliGRAM(s) Oral every 6 hours PRN  baclofen 5 milliGRAM(s) Oral every 12 hours  levETIRAcetam 1500 milliGRAM(s) Oral two times a day  phenytoin   Suspension 100 milliGRAM(s) Oral every 8 hours    Anticoagulation:  heparin   Injectable 5000 Unit(s) SubCutaneous every 8 hours    OTHER:  acetylcysteine 10%  Inhalation 4 milliLiter(s) Inhalation every 6 hours  albuterol/ipratropium for Nebulization 3 milliLiter(s) Nebulizer every 6 hours  amLODIPine   Tablet 5 milliGRAM(s) Oral daily  artificial tears (preservative free) Ophthalmic Solution 1 Drop(s) Right EYE every 4 hours  chlorhexidine 0.12% Liquid 15 milliLiter(s) Oral Mucosa every 12 hours  chlorhexidine 2% Cloths 1 Application(s) Topical <User Schedule>  doxazosin 8 milliGRAM(s) Oral at bedtime  erythromycin   Ointment 1 Application(s) Right EYE at bedtime  fluticasone propionate 50 MICROgram(s)/spray Nasal Spray 1 Spray(s) Both Nostrils two times a day  influenza   Vaccine 0.5 milliLiter(s) IntraMuscular once  ofloxacin 0.3% Solution 1 Drop(s) Right EYE four times a day  pantoprazole  Injectable 40 milliGRAM(s) IV Push daily  petrolatum Ophthalmic Ointment 1 Application(s) Both EYES two times a day  propranolol 10 milliGRAM(s) Oral every 8 hours  sodium chloride 0.65% Nasal 1 Spray(s) Both Nostrils two times a day    CULTURES:  Culture Results:   >=3 organisms. Probable collection contamination. (01-12 @ 15:25)  Culture Results:   No growth at 5 days (01-12 @ 12:50)      ASSESSMENT:  44M PMH ?stroke/MI present to [facility] after collapsing at work. Decorticate posturing, vomiting, intubated for airway protection. 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 Transferred to [facility] for further management. s/p trach 10/14. s/p peg 10/21. Re-upgrade to ICU 2/2 desaturation event and suctioning requirements 11/15. Re-upgrade to NSICU 12/15 2/2 desaturation and tachycardia.    AMS    Intubate    Handoff    MEWS Score    Acute myocardial infarction    CVA (cerebral vascular accident)    Intracerebral hemorrhage of brain stem    Brainstem stroke    Brain stem stroke syndrome    Brain stem hemorrhage    Brain stem stroke syndrome    Hemorrhagic stroke    Brainstem stroke    Encephalopathy acute    Functional quadriplegia    Advanced care planning/counseling discussion    Encounter for palliative care    Pontine hemorrhage    Neurogenic dysphagia    Chronic respiratory failure    Acute kidney injury superimposed on CKD    Acute urinary retention    Hypertensive emergency    Sepsis, unspecified organism    Sepsis    Gram-negative bacteremia    Dyspnea    Percutaneous tracheal puncture    Altered mental status examination    EGD, with PEG    AMS    Room Service Assist    [email or web address]    PLAN:  Neuro:  - neuro/vitals q4h  - pain control: tylenol prn  - seizure tx: keppra 1500mg BID, phenytoin 100mg PO TID  - vEEG (10/3-4) negative, (10/17-10/19) negative, (12/17-12/18) negative, (12/22-12/25) +focal motor seizures not correlating w/ EEG  - CTH 9/30: enlarged pontine hemorrhage, CTH 10/3: stable, CTH 10/25: mostly resolved pontine hemorrhage, CTH 12/15: R mastoid air cell opacification; acute otitis media vs sterile effusion, CTH 12/18: stable.  - MRI brain 10/12: parenchymal hemorrhage, acute/subacute R cerebellar stroke      CV:  - -160  - tachycardia: propranolol 10mg q8  - HTN: amlodipine 5mg  - echo (9/30) EF 75%, repeat 12/16: 57%    Resp:  - trach to vent, AC/VC 40/400/14/6  - Secretions: duonebs/mucomyst/chest PT q6h     GI:  - TF via PEG (placed 10/21 by gen surg)  - bowel regimen held for loose stool, last BM 1/17  - baclofen 5q12 for hiccups     Renal:  - IVL, FW 200q4 for hydration   - Urinary retenion: Gabriel removed 1/7, Voiding  - CKD: trend BUN/Cr  - renal US 10/1, 10/8, 1/15: Increased bilateral renal echogenicity consistent with medical renal disease    Endo:  - A1c 5.4    Heme:  - DVT ppx: SCDs, SQH 5000u q8h   - LE dopplers negative 12/16    ID:  - last pan cx 1/12   - empiric PNA: cefepime (12/22-12/30), s/p vanc (12/22-12/23), s/p zosyn (12/15-12/20), s/p vanc (12/15-12/16), s/p zosyn (1/12 -1/18)  - s/p Stenotrophomonas maltophilia PNA: s/p Bactrim (11/18-11/19) s/p Cefepime (11/16-11/18)  - 11/3, (+) sputum for stenotrophomas maltophlia, blood cx (+) klebsiella, cefepime 2gq12 (11/6 - 11/12)   - empiric tx: s/p zosyn (11/3-11/6) , s/p vanc  (11/3-11/5)  - S/p Ancef (10/4-10/14) for PNA, and s/p Unasyn (10/18-10/23) +actinobacter baumanii     MISC:  - ophtho consult for keratitis  - erythromycin ointment R eye q4hrs, ofloxacin ointment R eye QID, artificial tears R eye q2hrs, moisture chamber at bedtime    Dispo: SDU status, DNR, pending PRUCOL for benefits     D/w Dr. D'Amico

## 2025-01-19 NOTE — PROGRESS NOTE ADULT - ASSESSMENT
44M with unclear PMH (?stroke, MI) who was found down at work, intubated for airway protection and found to have acute parenchymal hemorrhage within nabil with mass effect (+ acute/subacute right cerebellar infarct) in setting of hypertensive emergency, transferred to [facility] for further neurosurgical care. Hospital course c/b poor neurologic recovery s/p trach-PEG, AUR s/p gabriel, ANIKA on CKD c/b hyperkalemia, HAP s/p amp-sulbactam (EOT 10/23), K aerogenes bacteremia  treated with 2 weeks of Cefepime per ID , On 11/15 he became septic and was transferred to NSICU due to increased o2 requirements and needed Vent support , Trach Cultures + for Stenotrophomonas which was treated with 7 days of Bactrim per ID , has been weaned of Vent since 11/23 and is now on 10lts at 40% o2 through Trach Collar. Stepped up to the NSICU on 12/15 for hypoxia and tachycardia. PE ruled out. On abx for 5 days. Unclear etiology. Now stepped down to 8Lach.    # Pontine hemorrhage  # Encephalopathy due to Intracranial Bleed   - Acute parenchymal hemorrhage within nabil with mass effect (+ acute/subacute right cerebellar infarct) in setting of hypertensive emergency.   - MRI brain also demonstrated acute/subacute R cerebellar stroke.   - No Neurosurgical Interventions were performed   - Secondary to IPH and cerebellar stroke patient has become Trach and PEG  Dependent    #Fever   -Pt completed Zosyn tx course on   1/18   -Pt is afebrile and no leukocytosis   -Pt's blood cx on 1/12/25 was negative     #Acute kidney injury (RESOLVED)   # Acute Tubular Necrosis superimposed on CKD stage III  - Pt s/p US renal with chronic medical renal disease  -  Continue Free water to 200 q4h. Will continue to monitor creatinine  -Monitor UOP, bladder scan per protocol.     # Seizures  - Continue Seizure precautions   - Continue  Keppra and Fosphenytoin     # Hypertension  -Pt with elevated BP overnight   - Continue amlodipine 5mg daily with holding parameters  - Will continue to monitor BP and uptitrate antihypertensive as required     #Bradycardia  -Pt was bradycardiac to 50-60's overnight   -Pt's propranolol was decreased today 1/19 from 10mg PO q8 to 5mg PO q8 with holding parameters     # Chronic respiratory failure.   - S/p percutaneous trach by pulm on 10/14/24  - Currently on Vent Support   - Continue Chest PT    # Neurogenic dysphagia.   - Pt s/p PEG with surgery 10/21/24  - TF per nutrition  - aspiration precautions and elevation of HOB.    #Acute urinary retention.   - doxazosin 8mg  - continue to monitor urine oupt     # Functional quadriplegia in setting of brainstem hemorrhage  - decub precautions  - care per nursing protocol     # DVT prop: Heparin SQ q8  Discussed with primary team       55 minutes spent on total encounter. The necessity of the time spent during the encounter on this date of service was due to:    Review of hospital course, labs, vitals, medical records.  Bedside exam and interview of the patient  Discussed plan of care with primary team   Documenting the encounter.

## 2025-01-20 PROCEDURE — 99231 SBSQ HOSP IP/OBS SF/LOW 25: CPT

## 2025-01-20 PROCEDURE — 99233 SBSQ HOSP IP/OBS HIGH 50: CPT

## 2025-01-20 RX ORDER — ACETAMINOPHEN 500 MG/5ML
650 LIQUID (ML) ORAL EVERY 6 HOURS
Refills: 0 | Status: DISCONTINUED | OUTPATIENT
Start: 2025-01-20 | End: 2025-03-13

## 2025-01-20 RX ADMIN — OFLOXACIN 1 DROP(S): 3 SOLUTION OPHTHALMIC at 11:55

## 2025-01-20 RX ADMIN — AMLODIPINE BESYLATE 5 MILLIGRAM(S): 10 TABLET ORAL at 07:08

## 2025-01-20 RX ADMIN — HEPARIN SODIUM 5000 UNIT(S): 1000 INJECTION INTRAVENOUS; SUBCUTANEOUS at 13:01

## 2025-01-20 RX ADMIN — LEVETIRACETAM 1500 MILLIGRAM(S): 10 INJECTION, SOLUTION INTRAVENOUS at 17:22

## 2025-01-20 RX ADMIN — Medication 1 DROP(S): at 01:09

## 2025-01-20 RX ADMIN — BACLOFEN 5 MILLIGRAM(S): 10 INJECTION INTRATHECAL at 06:27

## 2025-01-20 RX ADMIN — Medication 1 DROP(S): at 06:28

## 2025-01-20 RX ADMIN — Medication 1 DROP(S): at 09:14

## 2025-01-20 RX ADMIN — HEPARIN SODIUM 5000 UNIT(S): 1000 INJECTION INTRAVENOUS; SUBCUTANEOUS at 06:29

## 2025-01-20 RX ADMIN — OFLOXACIN 1 DROP(S): 3 SOLUTION OPHTHALMIC at 06:27

## 2025-01-20 RX ADMIN — BACLOFEN 5 MILLIGRAM(S): 10 INJECTION INTRATHECAL at 17:22

## 2025-01-20 RX ADMIN — Medication 15 MILLILITER(S): at 17:22

## 2025-01-20 RX ADMIN — ACETYLCYSTEINE 4 MILLILITER(S): 200 INHALANT RESPIRATORY (INHALATION) at 00:17

## 2025-01-20 RX ADMIN — LEVETIRACETAM 1500 MILLIGRAM(S): 10 INJECTION, SOLUTION INTRAVENOUS at 06:27

## 2025-01-20 RX ADMIN — IPRATROPIUM BROMIDE AND ALBUTEROL SULFATE 3 MILLILITER(S): .5; 2.5 SOLUTION RESPIRATORY (INHALATION) at 17:20

## 2025-01-20 RX ADMIN — Medication 1 APPLICATION(S): at 17:34

## 2025-01-20 RX ADMIN — IPRATROPIUM BROMIDE AND ALBUTEROL SULFATE 3 MILLILITER(S): .5; 2.5 SOLUTION RESPIRATORY (INHALATION) at 00:17

## 2025-01-20 RX ADMIN — Medication 15 MILLILITER(S): at 06:29

## 2025-01-20 RX ADMIN — Medication 650 MILLIGRAM(S): at 10:47

## 2025-01-20 RX ADMIN — ACETYLCYSTEINE 4 MILLILITER(S): 200 INHALANT RESPIRATORY (INHALATION) at 17:21

## 2025-01-20 RX ADMIN — IPRATROPIUM BROMIDE AND ALBUTEROL SULFATE 3 MILLILITER(S): .5; 2.5 SOLUTION RESPIRATORY (INHALATION) at 11:53

## 2025-01-20 RX ADMIN — Medication 1 SPRAY(S): at 17:33

## 2025-01-20 RX ADMIN — Medication 100 MILLIGRAM(S): at 17:21

## 2025-01-20 RX ADMIN — Medication 1 APPLICATION(S): at 06:29

## 2025-01-20 RX ADMIN — Medication 100 MILLIGRAM(S): at 09:14

## 2025-01-20 RX ADMIN — IPRATROPIUM BROMIDE AND ALBUTEROL SULFATE 3 MILLILITER(S): .5; 2.5 SOLUTION RESPIRATORY (INHALATION) at 06:28

## 2025-01-20 RX ADMIN — Medication 1 DROP(S): at 13:04

## 2025-01-20 RX ADMIN — ACETYLCYSTEINE 4 MILLILITER(S): 200 INHALANT RESPIRATORY (INHALATION) at 11:53

## 2025-01-20 RX ADMIN — Medication 1 APPLICATION(S): at 06:30

## 2025-01-20 RX ADMIN — Medication 650 MILLIGRAM(S): at 09:15

## 2025-01-20 RX ADMIN — OFLOXACIN 1 DROP(S): 3 SOLUTION OPHTHALMIC at 00:17

## 2025-01-20 RX ADMIN — OFLOXACIN 1 DROP(S): 3 SOLUTION OPHTHALMIC at 17:34

## 2025-01-20 RX ADMIN — Medication 1 SPRAY(S): at 06:29

## 2025-01-20 RX ADMIN — Medication 1 DROP(S): at 17:30

## 2025-01-20 RX ADMIN — FLUTICASONE PROPIONATE 1 SPRAY(S): 50 SPRAY, METERED NASAL at 06:29

## 2025-01-20 RX ADMIN — DOXAZOSIN MESYLATE 8 MILLIGRAM(S): 8 TABLET ORAL at 21:30

## 2025-01-20 RX ADMIN — HEPARIN SODIUM 5000 UNIT(S): 1000 INJECTION INTRAVENOUS; SUBCUTANEOUS at 21:31

## 2025-01-20 RX ADMIN — Medication 100 MILLIGRAM(S): at 00:17

## 2025-01-20 RX ADMIN — ACETYLCYSTEINE 4 MILLILITER(S): 200 INHALANT RESPIRATORY (INHALATION) at 06:28

## 2025-01-20 RX ADMIN — Medication 40 MILLIGRAM(S): at 11:53

## 2025-01-20 RX ADMIN — FLUTICASONE PROPIONATE 1 SPRAY(S): 50 SPRAY, METERED NASAL at 17:33

## 2025-01-20 RX ADMIN — ERYTHROMYCIN 1 APPLICATION(S): 5 OINTMENT OPHTHALMIC at 21:31

## 2025-01-20 RX ADMIN — Medication 1 DROP(S): at 21:31

## 2025-01-20 NOTE — PROGRESS NOTE ADULT - ASSESSMENT
44M with unclear PMH (?stroke, MI) who was found down at work, intubated for airway protection and found to have acute parenchymal hemorrhage within nabil with mass effect (+ acute/subacute right cerebellar infarct) in setting of hypertensive emergency, transferred to [facility] for further neurosurgical care. Hospital course c/b poor neurologic recovery s/p trach-PEG, AUR s/p gabriel, ANIKA on CKD c/b hyperkalemia, HAP s/p amp-sulbactam (EOT 10/23), K aerogenes bacteremia  treated with 2 weeks of Cefepime per ID , On 11/15 he became septic and was transferred to NSICU due to increased o2 requirements and needed Vent support , Trach Cultures + for Stenotrophomonas which was treated with 7 days of Bactrim per ID , has been weaned of Vent since 11/23 and is now on 10lts at 40% o2 through Trach Collar. Stepped up to the NSICU on 12/15 for hypoxia and tachycardia. PE ruled out. On abx for 5 days. Unclear etiology. Now stepped down to 8Lach.    # Pontine hemorrhage  # Encephalopathy due to Intracranial Bleed   - Acute parenchymal hemorrhage within nabil with mass effect (+ acute/subacute right cerebellar infarct) in setting of hypertensive emergency.   - MRI brain also demonstrated acute/subacute R cerebellar stroke.   - No Neurosurgical Interventions were performed   - Secondary to IPH and cerebellar stroke patient has become Trach and PEG  Dependent    #Fever   -Pt completed Zosyn tx course on  1/18   -Pt is afebrile and no leukocytosis   -Pt's blood cx on 1/12/25 was negative     #Acute kidney injury (RESOLVED)   # Acute Tubular Necrosis superimposed on CKD stage III  - Pt s/p US renal with chronic medical renal disease  -  Continue Free water to 200 q4h. Will continue to monitor creatinine  -Monitor UOP, bladder scan per protocol.     # Seizures  - Continue Seizure precautions   - Continue  Keppra and Fosphenytoin     # Hypertension  -Pt with elevated BP overnight   - Continue amlodipine 5mg daily with holding parameters  - Will continue to monitor BP and uptitrate antihypertensive as required     #Bradycardia  -Pt's propranolol was decreased on 1/19 from 10mg PO q8 to 5mg PO q8 with holding parameters     # Chronic respiratory failure.   - S/p percutaneous trach by pulm on 10/14/24  - Currently on Vent Support   - Continue Chest PT    # Neurogenic dysphagia.   - Pt s/p PEG with surgery 10/21/24  - TF per nutrition  - aspiration precautions and elevation of HOB.    #Acute urinary retention.   - doxazosin 8mg  - continue to monitor urine oupt     # Functional quadriplegia in setting of brainstem hemorrhage  - decub precautions  - care per nursing protocol     # DVT prop: Heparin SQ q8  Discussed with primary team       55 minutes spent on total encounter. The necessity of the time spent during the encounter on this date of service was due to:    Review of hospital course, labs, vitals, medical records.  Bedside exam and interview of the patient  Discussed plan of care with primary team   Documenting the encounter.

## 2025-01-20 NOTE — PROGRESS NOTE ADULT - SUBJECTIVE AND OBJECTIVE BOX
Patient was seen and examined at bedside. Case discuss with the primary team. Pt w/o any events overnight.     OBJECTIVE:  Vital Signs Last 24 Hrs  T(C): 36.4 (20 Jan 2025 08:59), Max: 36.7 (20 Jan 2025 04:57)  T(F): 97.5 (20 Jan 2025 08:59), Max: 98.1 (20 Jan 2025 04:57)  HR: 66 (20 Jan 2025 08:54) (57 - 70)  BP: 123/89 (20 Jan 2025 08:00) (116/76 - 151/101)  BP(mean): 102 (20 Jan 2025 08:00) (91 - 120)  RR: 14 (20 Jan 2025 08:54) (14 - 16)  SpO2: 98% (20 Jan 2025 08:54) (97% - 100%)    Parameters below as of 20 Jan 2025 08:54  Patient On (Oxygen Delivery Method): ventilator    O2 Concentration (%): 40      PHYSICAL EXAM:  General: minimally responsive on ventilator, NAD, no labored breathing   HEENT: trach collar in place, clean  Lungs: anterior exam, limited, no crackles, no wheezes  Heart: regular  Abdomen: soft, no distension, + BS  Extremities:  warm, trace edema, not following commands     acetaminophen   Oral Liquid .. 650 milliGRAM(s) Oral every 6 hours PRN  acetylcysteine 10%  Inhalation 4 milliLiter(s) Inhalation every 6 hours  albuterol/ipratropium for Nebulization 3 milliLiter(s) Nebulizer every 6 hours  amLODIPine   Tablet 5 milliGRAM(s) Oral daily  artificial tears (preservative free) Ophthalmic Solution 1 Drop(s) Right EYE every 4 hours  baclofen 5 milliGRAM(s) Oral every 12 hours  chlorhexidine 0.12% Liquid 15 milliLiter(s) Oral Mucosa every 12 hours  chlorhexidine 2% Cloths 1 Application(s) Topical <User Schedule>  doxazosin 8 milliGRAM(s) Oral at bedtime  erythromycin   Ointment 1 Application(s) Right EYE at bedtime  fluticasone propionate 50 MICROgram(s)/spray Nasal Spray 1 Spray(s) Both Nostrils two times a day  heparin   Injectable 5000 Unit(s) SubCutaneous every 8 hours  influenza   Vaccine 0.5 milliLiter(s) IntraMuscular once  levETIRAcetam 1500 milliGRAM(s) Oral two times a day  ofloxacin 0.3% Solution 1 Drop(s) Right EYE four times a day  pantoprazole  Injectable 40 milliGRAM(s) IV Push daily  petrolatum Ophthalmic Ointment 1 Application(s) Both EYES two times a day  phenytoin   Suspension 100 milliGRAM(s) Oral every 8 hours  propranolol 5 milliGRAM(s) Oral every 8 hours  sodium chloride 0.65% Nasal 1 Spray(s) Both Nostrils two times a day

## 2025-01-20 NOTE — PROGRESS NOTE ADULT - SUBJECTIVE AND OBJECTIVE BOX
HPI:  Unknown age male, unknown past medical history (reported stroke and MI by coworkers) presented to [facility] with AMS, Pt was working at [employer] when he was found down by coworkers. EMS called and pt brought to [facility] ED. Intubated, sedated, started on cardene for SBPs in 200s. CT head showed brain stem bleed. Transferred to NSICU for further management.  (30 Sep 2024 12:55)    HOSPITAL COURSE:  9/30: transferred from [facility]. A line placed. Versed dc'd. Cy Rader at bedside, states pt has family in [place], cannot confirm medications or PMH other than stroke and MI. 250cc bolus 3% given. LR switched to NS. hydralazine 25q8 started, 3% started, switched propofol to precedex   10/1: stability CTH done. Added labetalol, started TF. Palliative consulted. ethics consulted to determine surrogate. febrile 103, pan cx sent  10/2: BD 2, GEORGE overnight. TF resumed. Desatt'd to 80s, FiO2 inc. to 50. Fentanyl given, ABG, CXR ordered. Maxxed on precedex, started on propofol for DARIEN -4 - -5. Precedex dc'd. Duonebs, mucomyst, hypertonic added. 3% dc'd. Cardene dc'd. Start vanc/CTX. Increased labetalol 200q8. MRSA negative, dc'd vanc. ETT pulled back 2cm x 2, good positioning after confirmatory chest xray. Ethics attempting to establish HCP with family. Na 159, starting FW 250q6 for range 150-155.   10/3: BD3, GEORGE o/n, neuro stable. Na elevating, FW increased to 300q6. Dc'd bowel reg for diarrhea. vEEG started. SQH 5000q8 tonight.   10/4: BD 4, albumn bolus, incr. LR to 80 2/2 incr. in Cr, LR to 10 0cc/hr for uptrending Cr. Started 7% hypersal for 48hrs and SL atropine for inline/oral thick secretions. Dc'd CTX and started ancef for MSSA in the sputum. Nephrology consulted for CKD, f/u recs. SBP 170s, given hydralazine 10mg IVP.   10/5: BD5, o/n 10mg IVP hydralazine given for SBP 170s and started on hydralazine 25q8 via OGT. 10mg IV push labetalol for SBP > 160s. RT placed for diarrhea.   10/6: BD6, o/n FW increased to 350q4 per nephrology recs. IV tylenol for temp 100.6, SBp 160s presumed uncomfortable.   10/7: BD7, overnight pancultured for temp 101.8F.   10/8: BD8. GEORGE. Cr bumped. decreased LR to 75cc/hr. Adding simethicone ATC. incr hydralazine 50mgTID. Incr labetalol 300mgTID. Na 145, decreased FWF to 250q6. Start precedex. FENa consistent with intrinsic kidney injury. Pend repeat renal US. Retaining up to 1.3L, bladder scans q6, straight cath PRN  10/9: BD 9. GEORGE overnight. Neuro stable. abd xray for distention w non-specific gas pattern, OGT to LIWS for morning. duonebs/mucomyst to q8 for improving secretions. Changed tube feeds to Jevity 1.5 20cc/hr, low rate due to abdominal distention, nepro dense and more difficult to digest. Tolerating CPAP, confirmed by ABG.   10/10: BD 10. GEORGE overnight. Neuro stable. (+) gabriel for urinary retention on bladder scan. inc TF to goal rate of 40cc/hr. family leaning toward pursuing trach/PEG. 1/2 amp for FS 81.   10/11: BD 11. GEORGE overnight. Neuro stable. Trach/PEG consults placed.   10/12: BD 12. GEORGE overnight. Neuro stable. MRI brain complete.   10/13: BD 13. Increase flomax. Hold SQH after PM dose for trach tm. IVL.   10/14: BD 14. GEORGE overnight, remains on AC/VC. Gabriel placed for urinary retention. Dc'd free water.  S/p trach with pulm. NGT placed and CXR confirmed in good position.   10/15: BD 15, GEORGE ovn. resumed feeds. spiked 101, pan cx sent.   10/16: BD 16. GEORGE ovn. Lokelma 5mg for K+ 5.4. Started vanc q 24/zosyn for empiric PNA coverage, IVF to 100/hr. PEG held for fever.   10/17: BD 17,  ordered serum osm and urine osm for am. Started sinemet for neurostimulation. Increased cardura to 0.8. Started FW 100q4, dc'd IVF. MRSA negative, dc'd vanc. NGT replaced d/t coiling.   10/18: BD 18, GEORGE overnight, neuro stable. Amantadine added for neurostim. zosyn changed to unasyn for acinetobacter baumannii, failed TOV and required SC  10/19: BD 19, GEORGE ovn. cardura 2mg added for retention. labetalol decreased 200q8, hydralazine decreased 25q8. Gabriel replaced.   10/20: BD20, GEORGE overnight. NGT dislodged, replaced. PEG tomorrow w/ gen surg, FW increased to 150q4 and labetalol decreased to 100q8, lokelma given for hyperkalemia.   10/21: BD 21. POD0 PEG placement with Gen surg. decr labetolol to 50q8, incr. cardura to 0.4, started lokelma and phoslo, dc gabriel POD0 PEG placement with Gen surg.  10/22: BD 22. Plan to start TF today via PEG. dc labetalol, Following ophtho recs. Increased apnea settings - found to be in cheyne-moe respiration. CPAP 5/5.  10/23: BD 23. hydralazine d/c'd, trach collar trial today. Rectal tube placed at 6am.  10/24: BD24, o/n lokelma held due to diarrhea. Free water 100q6 resumed. dc'd tamsulosin, amantadine. Incr'd cardura to 8mg qhs. Dc'd FW. Switched jevity to nepro. gabrile placed for high urine output. Started SL atropine for oral secretions. Dc'd free water.  10/25: BD25, o/n decreased suctioning requirements to > q4hrs, GEORGE. Cr improving, cont phoslo, lokelma held at this time. Gabriel placed yest, cont. Tolerating trach collar. Given 500cc plasmalyte bolus for ANIKA. Dc'd sinemet.   10/26: BD26, o/n resumed lokelma 5mg daily and resumed 100cc free water q6hrs. Change in neuro status with new right pupillary dilation with anisocoria (right pupil 6mm fixed and left pupil 3mm briskly reactive). Given 23.4% NaCl bullet, taken for emergent CTH showing mostly resolved pontine hemorrhage, continued brainstem hypodensity likely edema d/t hemorrhage, no new hemorrhage or infarct, no herniation, mild increase in size of left lateral ventricle. Vitals remaine stable. Na goal > 140.   10/27: BD27, o/n GEORGE.Neuro stable. Pend stepdown with airway bed.   10/28: BD 28. GEORGE overnight. Neuro stable. Miralax ordered. Gabriel removed, pending TOV.  10/29: BD 29. GEORGE o/n. Given 2L NS over 8 hrs for increased BUN/Cr ratio. Gabriel placed for frequent straight cath.   10/30: BD 30.   10/31: BD 31. GEORGE overnight. Na 149, increased free water to 200q6. 1L NS for uptrending BUN.   11/1: BD 32. GEORGE overnight. Given 1L NS for dehydration. Na 146, increased FW to 250q6.   11/2: BD 33 GEORGE overnight, neuro stable, given 500cc bolus for net negative status and tachycardia   11/3: BD 34, GEORGE overnight, neuro stable. Patient remains tachycardic, EKG showing sinus tachycardia, given additional 500cc NS bolus. Febrile to 101.9F, pan cultured (without UA), CXR WNL, given tylenol.   11/4: BD 35, GEORGE overnight, neuro stable. Given 1L NS for tachycardia. sputum (+) for stenotropohomas maltophilia.   11/5: BD 36 GEORGE overnight, neuro stable. Vancomycin dc'd. Chest PT BID. ID consulted, cont zosyn.  11/6: BD 37. blood cx + klebsiella dc zosyn changed to cefepime, CTAP ordered, rpt blood cx sent.    11/7: BD 38. Pending CT A/P, given 250cc bolus and starting maintenance fluids overnight. Pending CT A/P after bolus   11/8: BD 39. CT CAP negative for infection.   11/9: BD 40. GEORGE overnight.  11/10: BD 41. GEORGE overnight. desat to 85 on trach collar, O2 inc to 10L and 100%, O2 sat inc to 95. pt tachy to 110s, euvolemic. given tylenol. ABG and CXR ordered. spiked fever, pancultured, RVP negative. AM ABG w pO2 79, rpt w pO2 79. pt appears comfortable, satting 94%.   11/11: BD 42. GEORGE overnight. pt became tachy to 130s, desat to 90 on 100% FiO2 and 10L. suctioned, (+) productive cough. temp 101.4, given 1g IV tylenol and 500cc NS bolus for euvolemia. fever and HR downtrending. LE dopplers negative for dvt  11/12: BD 43, GEORGE ovn, fever and HR downtrending, satting 97% 70% FIO2  11/13: BD 44, GEORGE ovn. started standing tylenol x24 hours for tachycardia. desat to 80s, o2 increased. CXR stable, pending CTA PE protocol.   11/14: BD 45, GEORGE overnight, neuro stable. resp therapy dec FiO2 to 70%.   11/15: BD 46, GEORGE overnight, neuro stable.  Rapid called for desaturation 30s, tachycardic 140s. Patient bagged, 100% fio2, heavily suctioned. CXR/POCUS unremarkable. ABG c/w desaturation. WBC 14.71. Afebrile. O2 improved to 90s and patient upgraded to ICU. ABG paO2 30s improved to 89 on vent. IV Tylenol x 1, sputum sent. Start protonix while o-n vent.   11/16: BD 47. POCUS showed collapsable IVCF, given 1L bolus. Vanco/Cefpime added empriic for PNA, NGT feeds restarted. MRSA swab neg, Vanc DC'd.   11/17: BD 48. GEORGE overnight. 1l bolus for tachycardia. Spiked to 101, cultured. 500cc bolus for tachycardia, tachy to 148 given 25mcg fentanyl, 250cc albumin, 1.5L bolus. 5 IV lopressor with response HR to 100s. +Stenotrophomonas on sputum cx.   11/18: BD 49. GEORGE overnight. Consulted ID, cefepime switched to bactrim x7days. Started hydrochlorothiazine 12.5mg daily.  11/19: BD 50. Tachy 120s, given tylenol and 500cc NS. Tolerating 5/5, switched to TCx. Holding phos binder. D/c Bactrim. D/c gabriel, f/u TOV. Dc'd PPI.   11/20: BD 51. GEORGE ovn. 1600 satting low 90s, mildly tachy to 110s, afebrile, RR wnl. O2 improved to mid 90s while inc O2 to 100% on TCx. CPAP 5/5 placed back on.  11/21: BD 52, GEORGE ovn, tolerating CPAP 5/5. Switch to trach collar during the day if tolerating well. HCTZ held for Cr bump, straight cath frequence increased to q4  11/22: BD 53, GEORGE ovn. Resumed phoslo. Gabriel placed. Resumed HCTZ.   11/23: BD 54. Holding tylenol in setting of possible fever, will require pan cx if febrile. Cr improved today. Cont CPAP. Bowel regimen held i/s/o diarrhea. FOBT negative.  11/24: BD 55. GEORGE overnight. Neuro stable. HCTZ dc'ed, started lisinopril 5. Lokelma dc'ed for K 3.7.   11/25: BD 56, GEORGE overnight. Neuro stable. dc'd lisinopril 5mg. Gabriel dc'd. TOV. 1545 noted to be hypotensive, MAP 50, in supine position on chair, HR 60s, afebrile, O2 96%. Given 1L cc NS bolus, placed back on bed in reverse trendelenberg, improved to map of 66. Neostick at bedside. Vitals check q1h. Dc'ed amlodipine. Failed TOV, bladder scan q6, sc prn. Added back Senna.   11/26: BD 57, GEORGE overnight. Neuro stable. Dc'd phoslo.   11/27: BD 58, GEORGE overnight. Neuro stable.    11/28: BD 59. Gabriel replaced.   11/29: GEORGE.  11/30: GEORGE, neuro stable.   12/1: BD 62, GEORGE overnight  12/2: BD 63, GEORGE overnight.; Given 1L bolus of LR for uptrending BUN/Cr.  12/3: BD 64. Reinstated eye gtt/moisture chamber given increased Rt eye injection  12/4: BD 65. GEORGE overnight. Attempted to speak with ophtho regarding eyelid weight/closure but no answer, full mailbox.   12/5: BD 66, GEORGE overnight, bowel regimen increased and had BM.   12/6: BD 67, GEORGE overnight, neuro stable.  12/7: GEORGE overnight, neuro stable. /110s, given x1 hydralazine 10 mg IVP. Restarted home amlodipine 5mg.  12/8: GEORGE. OOB to chair.     12/11: GEORGE, mucomyst added for thick secretions, simethicone for abd distension, abd xray with stool burden, increased bowel regimen.   12/12: GEORGE overnight.   12/13: GEORGE overnight.   12/14: GEORGE overnight  12/15: o/n Patient became tachycardic HR 120s and 10 minutes later O2 sat dropped as low as 89%. Patient suctioned without improvement in O2 sat and tube feeds found in suction catheter. TFs held.  STAT CXR ordered. STAT labs sent. Respiratory therapist called to bedside and patient trach connected to ventilator. After connection to ventilator and further suctioning O2 sat improved to 97% but patient HR remains 120-130s. Upgraded to NSICU for further management. Vancomycin and zosyn started. CTA  chest PE protocol and CTH ordered. Blood cultures sent.given 500cc bolus, rpt ABG sent pO2 243, CTH and CTA chest done. FS while NPO, FiO2 dec 50 pending ABG. sputum cx positive for few GPC and GNR.   12/16: GEORGE overnight, restarted amlodipine, troponin 75   12/17: GEORGE overnight, neuro stable. Attempted CPAP this morning, did not tolerate and back on full vent support. Dc'd Vanc. Tachycardia to 140s, noted extremities to be twitching along with jaw twitching. Given 2g of Keppra, total 4mg ativan and placed on EEG, full set of labs and lactate negative. Resumed trickle feeds at 20cc/hr. Given 250cc albumin for tachycardia.   12/18: GEORGE overnight. Neuro stable. CTH stable. EEG negative and dc'd.   12/19: GEORGE overnight. neuro stable. SIMV most of day, AC/VC at night.   12/20: GEORGE overnight, neuro stable. remains on VC/AC  12/21: GEORGE ovn. 1L bolus for tachy to 120s-130s.   12/22: GEORGE ovn. Tachy to 120s, given tylenol and IVF. 2mg IV ativan given for L jaw twitching. Sx resolved for 3 minutes and started again. Epilepsy contacted. Given 2mg IV ativan. Continued twitching. Increased keppra to 1500mg BID, 2mg ativan given. Propranolol started for refractory tachycardia. vEEG ordered. albumin given. POCUS performed, no b lines. Started on fosphenytoin, loaded w 20mg/kg then 100mg TID. febrile, pancx, started cefepime 2g q8, vancomycin  12/23: GEORGE ovn. +focal motor seizures on eeg. ID consulted. Vancomycin dc'ed as per ID.  12/24: GEORGE ovn, neuro stable. Baclofen 5 mg q12 started for hiccups. Na 129 from 134. Urine lytes c/w SIADH. Given 250cc 3% bolus. CT chest for infection w/u - f/u read. Repeat Na 136. UA negative  12/25: GEORGE ovn.   12/26: GEORGE ovn  12/27: GEORGE overnight. Blood cx neg @ 4 days, DC cefepime per medicine (sputum colonized).   12/28: Desaturation o/n to 80's, CXR obtained, pulse ox changed and sats resolved. Na 133 from 138, 250cc 3% bolus given. Cefepime resumed until 12/30 per ID. Rpt Na 138.  12/29: GEORGE overnight. Na stable.   12/30: GEORGE overnight. Family meeting with son, pt now DNR.   12/31-1/20: No acute events     OVERNIGHT EVENTS: Held PM dose of propanolol for HR in 60s.      Vital Signs Last 24 Hrs  T(C): 36.4 (19 Jan 2025 22:05), Max: 36.4 (19 Jan 2025 05:57)  T(F): 97.6 (19 Jan 2025 22:05), Max: 97.6 (19 Jan 2025 22:05)  HR: 69 (20 Jan 2025 00:01) (55 - 70)  BP: 137/90 (19 Jan 2025 20:10) (124/81 - 151/101)  BP(mean): 108 (19 Jan 2025 20:10) (97 - 120)  RR: 14 (19 Jan 2025 20:10) (14 - 16)  SpO2: 98% (20 Jan 2025 00:01) (97% - 100%)    Parameters below as of 20 Jan 2025 00:01  Patient On (Oxygen Delivery Method): ventilator    O2 Concentration (%): 40    I&O's Summary    18 Jan 2025 07:01  -  19 Jan 2025 07:00  --------------------------------------------------------  IN: 2040 mL / OUT: 1790 mL / NET: 250 mL    19 Jan 2025 07:01  -  20 Jan 2025 00:25  --------------------------------------------------------  IN: 1300 mL / OUT: 725 mL / NET: 575 mL    PHYSICAL EXAM:  Constitutional: Pt found in bed in NAD   ENT: PERRL, vertical EOMi  Respiratory: +trach, breathing non-labored, symmetrical chest wall movement  Cardiovascuar: RRR, no murmurs  Gastrointestinal: +PEG, abdomen soft, non tender  Neurological:  AAOX3 with looking inferior for communication. Verbal function not intact, intermittently follows commands   Motor: RUE handgrip to command 2/5, RLE trace wiggles toes to command, LUE 0/5, LLE withdraws to noxious   Pronator Drift: unable to assess    DIET:  [] NPO  [] Mechanical  [X] Tube feeds    LABS:    01-18    140  |  105  |  44[H]  ----------------------------<  101[H]  3.8   |  23  |  1.19    Ca    9.1      18 Jan 2025 08:30  Phos  4.2     01-18  Mg     2.3     01-18        Urinalysis Basic - ( 18 Jan 2025 08:30 )    Color: x / Appearance: x / SG: x / pH: x  Gluc: 101 mg/dL / Ketone: x  / Bili: x / Urobili: x   Blood: x / Protein: x / Nitrite: x   Leuk Esterase: x / RBC: x / WBC x   Sq Epi: x / Non Sq Epi: x / Bacteria: x    Allergies    Allergy Status Unknown    Intolerances      MEDICATIONS:  Antibiotics:    Neuro:  acetaminophen   Oral Liquid .. 650 milliGRAM(s) Oral every 6 hours PRN  baclofen 5 milliGRAM(s) Oral every 12 hours  levETIRAcetam 1500 milliGRAM(s) Oral two times a day  phenytoin   Suspension 100 milliGRAM(s) Oral every 8 hours    Anticoagulation:  heparin   Injectable 5000 Unit(s) SubCutaneous every 8 hours    OTHER:  acetylcysteine 10%  Inhalation 4 milliLiter(s) Inhalation every 6 hours  albuterol/ipratropium for Nebulization 3 milliLiter(s) Nebulizer every 6 hours  amLODIPine   Tablet 5 milliGRAM(s) Oral daily  artificial tears (preservative free) Ophthalmic Solution 1 Drop(s) Right EYE every 4 hours  chlorhexidine 0.12% Liquid 15 milliLiter(s) Oral Mucosa every 12 hours  chlorhexidine 2% Cloths 1 Application(s) Topical <User Schedule>  doxazosin 8 milliGRAM(s) Oral at bedtime  erythromycin   Ointment 1 Application(s) Right EYE at bedtime  fluticasone propionate 50 MICROgram(s)/spray Nasal Spray 1 Spray(s) Both Nostrils two times a day  influenza   Vaccine 0.5 milliLiter(s) IntraMuscular once  ofloxacin 0.3% Solution 1 Drop(s) Right EYE four times a day  pantoprazole  Injectable 40 milliGRAM(s) IV Push daily  petrolatum Ophthalmic Ointment 1 Application(s) Both EYES two times a day  propranolol 5 milliGRAM(s) Oral every 8 hours  sodium chloride 0.65% Nasal 1 Spray(s) Both Nostrils two times a day    CULTURES:  Culture Results:   >=3 organisms. Probable collection contamination. (01-12 @ 15:25)  Culture Results:   No growth at 5 days (01-12 @ 12:50)    ASSESSMENT:  44M PMH ?stroke/MI present to [facility] after collapsing at work. Decorticate posturing, vomiting, intubated for airway protection. 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 Transferred to [facility] for further management. s/p trach 10/14. s/p peg 10/21. Re-upgrade to ICU 2/2 desaturation event and suctioning requirements 11/15. Re-upgrade to NSICU 12/15 2/2 desaturation and tachycardia.    AMS    Intubate    Handoff    MEWS Score    Acute myocardial infarction    CVA (cerebral vascular accident)    Intracerebral hemorrhage of brain stem    Brainstem stroke    Brain stem stroke syndrome    Brain stem hemorrhage    Brain stem stroke syndrome    Hemorrhagic stroke    Brainstem stroke    Encephalopathy acute    Functional quadriplegia    Advanced care planning/counseling discussion    Encounter for palliative care    Pontine hemorrhage    Neurogenic dysphagia    Chronic respiratory failure    Acute kidney injury superimposed on CKD    Acute urinary retention    Hypertensive emergency    Sepsis, unspecified organism    Sepsis    Gram-negative bacteremia    Dyspnea    Percutaneous tracheal puncture    Altered mental status examination    EGD, with PEG    AMS    Room Service Assist    [email or web address]    PLAN:  Neuro:  - neuro/vitals q4h  - pain control: tylenol prn  - seizure tx: keppra 1500mg BID, phenytoin 100mg PO TID  - vEEG (10/3-4) negative, (10/17-10/19) negative, (12/17-12/18) negative, (12/22-12/25) +focal motor seizures not correlating w/ EEG  - CTH 9/30: enlarged pontine hemorrhage, CTH 10/3: stable, CTH 10/25: mostly resolved pontine hemorrhage, CTH 12/15: R mastoid air cell opacification; acute otitis media vs sterile effusion, CTH 12/18: stable.  - MRI brain 10/12: parenchymal hemorrhage, acute/subacute R cerebellar stroke      CV:  - -160  - tachycardia: propranolol 5mg q8  - HTN: amlodipine 5mg  - echo (9/30) EF 75%, repeat 12/16: 57%    Resp:  - trach to vent, AC/VC 40/400/14/6  - Secretions: duonebs/mucomyst/chest PT q6h     GI:  - TF via PEG (placed 10/21 by gen surg)  - bowel regimen held for loose stool, last BM 1/19  - baclofen 5q12 for hiccups     Renal:  - IVL, FW 200q4 for hydration   - Urinary retenion: Gabriel removed 1/7, Voiding  - CKD: trend BUN/Cr  - renal US 10/1, 10/8, 1/15: Increased bilateral renal echogenicity consistent with medical renal disease    Endo:  - A1c 5.4    Heme:  - DVT ppx: SCDs, SQH 5000u q8h   - LE dopplers negative 12/16    ID:  - last pan cx 1/12   - empiric PNA: cefepime (12/22-12/30), s/p vanc (12/22-12/23), s/p zosyn (12/15-12/20), s/p vanc (12/15-12/16), s/p zosyn (1/12 -1/18)  - s/p Stenotrophomonas maltophilia PNA: s/p Bactrim (11/18-11/19) s/p Cefepime (11/16-11/18)  - 11/3, (+) sputum for stenotrophomas maltophlia, blood cx (+) klebsiella, cefepime 2gq12 (11/6 - 11/12)   - empiric tx: s/p zosyn (11/3-11/6) , s/p vanc  (11/3-11/5)  - S/p Ancef (10/4-10/14) for PNA, and s/p Unasyn (10/18-10/23) +actinobacter baumanii     MISC:  - ophtho consult for keratitis  - erythromycin ointment R eye q4hrs, ofloxacin ointment R eye QID, artificial tears R eye q2hrs, moisture chamber at bedtime    Dispo: SDU status, DNR, pending PRUCOL for benefits     D/w Dr. D'Amico

## 2025-01-21 LAB
ANION GAP SERPL CALC-SCNC: 13 MMOL/L — SIGNIFICANT CHANGE UP (ref 5–17)
BUN SERPL-MCNC: 38 MG/DL — HIGH (ref 7–23)
CALCIUM SERPL-MCNC: 8.9 MG/DL — SIGNIFICANT CHANGE UP (ref 8.4–10.5)
CHLORIDE SERPL-SCNC: 100 MMOL/L — SIGNIFICANT CHANGE UP (ref 96–108)
CO2 SERPL-SCNC: 25 MMOL/L — SIGNIFICANT CHANGE UP (ref 22–31)
CREAT SERPL-MCNC: 0.95 MG/DL — SIGNIFICANT CHANGE UP (ref 0.5–1.3)
EGFR: 100 ML/MIN/1.73M2 — SIGNIFICANT CHANGE UP
EGFR: 100 ML/MIN/1.73M2 — SIGNIFICANT CHANGE UP
GLUCOSE SERPL-MCNC: 103 MG/DL — HIGH (ref 70–99)
HCT VFR BLD CALC: 31.3 % — LOW (ref 39–50)
HGB BLD-MCNC: 10.5 G/DL — LOW (ref 13–17)
MAGNESIUM SERPL-MCNC: 2 MG/DL — SIGNIFICANT CHANGE UP (ref 1.6–2.6)
MCHC RBC-ENTMCNC: 31 PG — SIGNIFICANT CHANGE UP (ref 27–34)
MCHC RBC-ENTMCNC: 33.5 G/DL — SIGNIFICANT CHANGE UP (ref 32–36)
MCV RBC AUTO: 92.3 FL — SIGNIFICANT CHANGE UP (ref 80–100)
NRBC # BLD: 0 /100 WBCS — SIGNIFICANT CHANGE UP (ref 0–0)
NRBC BLD-RTO: 0 /100 WBCS — SIGNIFICANT CHANGE UP (ref 0–0)
PHENYTOIN FREE SERPL-MCNC: 3.3 UG/ML — LOW (ref 10–20)
PHOSPHATE SERPL-MCNC: 3.2 MG/DL — SIGNIFICANT CHANGE UP (ref 2.5–4.5)
PLATELET # BLD AUTO: 194 K/UL — SIGNIFICANT CHANGE UP (ref 150–400)
POTASSIUM SERPL-MCNC: 3.7 MMOL/L — SIGNIFICANT CHANGE UP (ref 3.5–5.3)
POTASSIUM SERPL-SCNC: 3.7 MMOL/L — SIGNIFICANT CHANGE UP (ref 3.5–5.3)
RBC # BLD: 3.39 M/UL — LOW (ref 4.2–5.8)
RBC # FLD: 13.8 % — SIGNIFICANT CHANGE UP (ref 10.3–14.5)
SODIUM SERPL-SCNC: 138 MMOL/L — SIGNIFICANT CHANGE UP (ref 135–145)
WBC # BLD: 7.05 K/UL — SIGNIFICANT CHANGE UP (ref 3.8–10.5)
WBC # FLD AUTO: 7.05 K/UL — SIGNIFICANT CHANGE UP (ref 3.8–10.5)

## 2025-01-21 PROCEDURE — 99233 SBSQ HOSP IP/OBS HIGH 50: CPT

## 2025-01-21 PROCEDURE — 70450 CT HEAD/BRAIN W/O DYE: CPT | Mod: 26

## 2025-01-21 RX ORDER — LEVETIRACETAM 10 MG/ML
2000 INJECTION, SOLUTION INTRAVENOUS ONCE
Refills: 0 | Status: DISCONTINUED | OUTPATIENT
Start: 2025-01-21 | End: 2025-01-21

## 2025-01-21 RX ORDER — PHENYTOIN 100 MG/4ML
100 SUSPENSION, ORAL (FINAL DOSE FORM) ORAL
Refills: 0 | Status: DISCONTINUED | OUTPATIENT
Start: 2025-01-21 | End: 2025-01-21

## 2025-01-21 RX ORDER — PHENYTOIN 100 MG/4ML
150 SUSPENSION, ORAL (FINAL DOSE FORM) ORAL
Refills: 0 | Status: DISCONTINUED | OUTPATIENT
Start: 2025-01-21 | End: 2025-01-23

## 2025-01-21 RX ORDER — PHENYTOIN 100 MG/4ML
100 SUSPENSION, ORAL (FINAL DOSE FORM) ORAL
Refills: 0 | Status: DISCONTINUED | OUTPATIENT
Start: 2025-01-21 | End: 2025-01-23

## 2025-01-21 RX ORDER — LEVETIRACETAM 10 MG/ML
500 INJECTION, SOLUTION INTRAVENOUS ONCE
Refills: 0 | Status: COMPLETED | OUTPATIENT
Start: 2025-01-21 | End: 2025-01-21

## 2025-01-21 RX ADMIN — IPRATROPIUM BROMIDE AND ALBUTEROL SULFATE 3 MILLILITER(S): .5; 2.5 SOLUTION RESPIRATORY (INHALATION) at 00:16

## 2025-01-21 RX ADMIN — HEPARIN SODIUM 5000 UNIT(S): 1000 INJECTION INTRAVENOUS; SUBCUTANEOUS at 21:24

## 2025-01-21 RX ADMIN — OFLOXACIN 1 DROP(S): 3 SOLUTION OPHTHALMIC at 17:40

## 2025-01-21 RX ADMIN — FLUTICASONE PROPIONATE 1 SPRAY(S): 50 SPRAY, METERED NASAL at 17:42

## 2025-01-21 RX ADMIN — IPRATROPIUM BROMIDE AND ALBUTEROL SULFATE 3 MILLILITER(S): .5; 2.5 SOLUTION RESPIRATORY (INHALATION) at 05:57

## 2025-01-21 RX ADMIN — OFLOXACIN 1 DROP(S): 3 SOLUTION OPHTHALMIC at 01:11

## 2025-01-21 RX ADMIN — Medication 1 SPRAY(S): at 07:45

## 2025-01-21 RX ADMIN — Medication 1 DROP(S): at 02:50

## 2025-01-21 RX ADMIN — Medication 1 APPLICATION(S): at 05:58

## 2025-01-21 RX ADMIN — Medication 15 MILLILITER(S): at 05:58

## 2025-01-21 RX ADMIN — OFLOXACIN 1 DROP(S): 3 SOLUTION OPHTHALMIC at 12:19

## 2025-01-21 RX ADMIN — Medication 100 MILLIGRAM(S): at 20:15

## 2025-01-21 RX ADMIN — Medication 100 MILLIGRAM(S): at 09:21

## 2025-01-21 RX ADMIN — Medication 1 DROP(S): at 09:21

## 2025-01-21 RX ADMIN — ACETYLCYSTEINE 4 MILLILITER(S): 200 INHALANT RESPIRATORY (INHALATION) at 23:54

## 2025-01-21 RX ADMIN — LEVETIRACETAM 1500 MILLIGRAM(S): 10 INJECTION, SOLUTION INTRAVENOUS at 17:42

## 2025-01-21 RX ADMIN — IPRATROPIUM BROMIDE AND ALBUTEROL SULFATE 3 MILLILITER(S): .5; 2.5 SOLUTION RESPIRATORY (INHALATION) at 17:40

## 2025-01-21 RX ADMIN — ACETYLCYSTEINE 4 MILLILITER(S): 200 INHALANT RESPIRATORY (INHALATION) at 12:10

## 2025-01-21 RX ADMIN — FLUTICASONE PROPIONATE 1 SPRAY(S): 50 SPRAY, METERED NASAL at 07:45

## 2025-01-21 RX ADMIN — Medication 1 APPLICATION(S): at 17:39

## 2025-01-21 RX ADMIN — ACETYLCYSTEINE 4 MILLILITER(S): 200 INHALANT RESPIRATORY (INHALATION) at 00:16

## 2025-01-21 RX ADMIN — LEVETIRACETAM 600 MILLIGRAM(S): 10 INJECTION, SOLUTION INTRAVENOUS at 06:22

## 2025-01-21 RX ADMIN — Medication 100 MILLIGRAM(S): at 00:18

## 2025-01-21 RX ADMIN — BACLOFEN 5 MILLIGRAM(S): 10 INJECTION INTRATHECAL at 05:57

## 2025-01-21 RX ADMIN — IPRATROPIUM BROMIDE AND ALBUTEROL SULFATE 3 MILLILITER(S): .5; 2.5 SOLUTION RESPIRATORY (INHALATION) at 12:10

## 2025-01-21 RX ADMIN — Medication 1 DROP(S): at 17:40

## 2025-01-21 RX ADMIN — Medication 1 APPLICATION(S): at 07:45

## 2025-01-21 RX ADMIN — HEPARIN SODIUM 5000 UNIT(S): 1000 INJECTION INTRAVENOUS; SUBCUTANEOUS at 14:58

## 2025-01-21 RX ADMIN — Medication 1 DROP(S): at 14:59

## 2025-01-21 RX ADMIN — Medication 1 SPRAY(S): at 17:42

## 2025-01-21 RX ADMIN — BACLOFEN 5 MILLIGRAM(S): 10 INJECTION INTRATHECAL at 17:43

## 2025-01-21 RX ADMIN — OFLOXACIN 1 DROP(S): 3 SOLUTION OPHTHALMIC at 23:54

## 2025-01-21 RX ADMIN — Medication 1 DROP(S): at 21:25

## 2025-01-21 RX ADMIN — Medication 40 MILLIGRAM(S): at 12:11

## 2025-01-21 RX ADMIN — DOXAZOSIN MESYLATE 8 MILLIGRAM(S): 8 TABLET ORAL at 21:25

## 2025-01-21 RX ADMIN — Medication 40 MILLIEQUIVALENT(S): at 07:40

## 2025-01-21 RX ADMIN — ACETYLCYSTEINE 4 MILLILITER(S): 200 INHALANT RESPIRATORY (INHALATION) at 17:43

## 2025-01-21 RX ADMIN — IPRATROPIUM BROMIDE AND ALBUTEROL SULFATE 3 MILLILITER(S): .5; 2.5 SOLUTION RESPIRATORY (INHALATION) at 23:54

## 2025-01-21 RX ADMIN — Medication 1 DROP(S): at 05:59

## 2025-01-21 RX ADMIN — LEVETIRACETAM 1500 MILLIGRAM(S): 10 INJECTION, SOLUTION INTRAVENOUS at 05:56

## 2025-01-21 RX ADMIN — OFLOXACIN 1 DROP(S): 3 SOLUTION OPHTHALMIC at 07:45

## 2025-01-21 RX ADMIN — HEPARIN SODIUM 5000 UNIT(S): 1000 INJECTION INTRAVENOUS; SUBCUTANEOUS at 05:58

## 2025-01-21 RX ADMIN — Medication 15 MILLILITER(S): at 17:43

## 2025-01-21 RX ADMIN — ERYTHROMYCIN 1 APPLICATION(S): 5 OINTMENT OPHTHALMIC at 21:25

## 2025-01-21 RX ADMIN — ACETYLCYSTEINE 4 MILLILITER(S): 200 INHALANT RESPIRATORY (INHALATION) at 05:58

## 2025-01-21 RX ADMIN — AMLODIPINE BESYLATE 5 MILLIGRAM(S): 10 TABLET ORAL at 05:57

## 2025-01-21 NOTE — PROGRESS NOTE ADULT - ASSESSMENT
44M with unclear PMH (?stroke, MI) who was found down at work, intubated for airway protection and found to have acute parenchymal hemorrhage within nabil with mass effect (+ acute/subacute right cerebellar infarct) in setting of hypertensive emergency, transferred to [facility] for further neurosurgical care. Hospital course c/b poor neurologic recovery s/p trach-PEG, AUR s/p gabriel, ANIKA on CKD c/b hyperkalemia, HAP s/p amp-sulbactam (EOT 10/23), K aerogenes bacteremia  treated with 2 weeks of Cefepime per ID , On 11/15 he became septic and was transferred to NSICU due to increased o2 requirements and needed Vent support , Trach Cultures + for Stenotrophomonas which was treated with 7 days of Bactrim per ID , has been weaned of Vent since 11/23 and is now on 10lts at 40% o2 through Trach Collar. Stepped up to the NSICU on 12/15 for hypoxia and tachycardia. PE ruled out. On abx for 5 days. Unclear etiology. Now stepped down to 8Lach.    # Pontine hemorrhage  # Encephalopathy due to Intracranial Bleed   - Acute parenchymal hemorrhage within nabil with mass effect (+ acute/subacute right cerebellar infarct) in setting of hypertensive emergency.   - MRI brain also demonstrated acute/subacute R cerebellar stroke.   - No Neurosurgical Interventions were performed   - Secondary to IPH and cerebellar stroke patient has become Trach and PEG  Dependent    #Fever   -Pt completed Zosyn tx course on  1/18   -Pt is afebrile and no leukocytosis   -Pt's blood cx on 1/12/25 was negative     #Acute kidney injury (RESOLVED)   # Acute Tubular Necrosis superimposed on CKD stage III  - Pt s/p US renal with chronic medical renal disease  -  Continue Free water to 200 q4h. Will continue to monitor creatinine  -Monitor UOP, bladder scan per protocol.     # Seizures  - Continue Seizure precautions   - Continue  Keppra and Fosphenytoin     # Hypertension  -Pt with elevated BP overnight   - Continue amlodipine 5mg daily with holding parameters  - Will continue to monitor BP and uptitrate antihypertensive as required     #Bradycardia  -Pt's propranolol was decreased on 1/19 from 10mg PO q8 to 5mg PO q8 with holding parameters     # Chronic respiratory failure.   - S/p percutaneous trach by pulm on 10/14/24  - Currently on Vent Support   - Continue Chest PT    # Neurogenic dysphagia.   - Pt s/p PEG with surgery 10/21/24  - TF per nutrition  - aspiration precautions and elevation of HOB.    #Acute urinary retention.   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

## 2025-01-21 NOTE — PROGRESS NOTE ADULT - ASSESSMENT
44 year-old-male with unclear PMH (?stroke, MI) who was found down at work, intubated for airway protection and found to have acute large parenchymal hemorrhage within nabil with mass effect (+ acute/subacute right cerebellar infarct) in setting of hypertensive emergency, and R cerebellar stroke transferred to [facility] for further neurosurgical care. 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 Epilepsy recalled as pt with vertical eye movements that appeared rhythmic and resolved after given AM Keppra +additional 500mg. Dilantin level 3.3. High suspicion for continued focal seizures, however given location of the bleed, suspect that repeating EEG will be limited utility. Given low therapeutic level, will recommend increasing Dilantin and monitoring clinically for progression.    Recommendations:  - Continue Keppra 1500mg Q12hrs  - Increase Phenytoin to 100/100/150mg.   - Maintain seizure/fall/aspiration precautions    Discussed with Dr. Farley  44 year-old-male with unclear PMH (?stroke, MI) who was found down at work, intubated for airway protection and found to have acute large parenchymal hemorrhage within nabil with mass effect (+ acute/subacute right cerebellar infarct) in setting of hypertensive emergency, and R cerebellar stroke transferred to [facility] for further neurosurgical care. 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 Epilepsy recalled as pt with vertical eye movements that appeared rhythmic and resolved after given AM Keppra +additional 500mg. Dilantin level 3.3. High suspicion for continued focal seizures, however given location of the bleed, suspect that repeating EEG will be limited utility. Given low therapeutic level, will recommend increasing Dilantin and monitoring clinically for progression.    Recommendations:  - Continue Keppra 1500mg Q12hrs  - Increase Phenytoin to 100/100/150mg.   - Recheck phenytoin level 1/23 AM  - Maintain seizure/fall/aspiration precautions    Discussed with Dr. Farley  44 year-old-male with unclear PMH (?stroke, MI) who was found down at work, intubated for airway protection and found to have acute large parenchymal hemorrhage within nabil with mass effect (+ acute/subacute right cerebellar infarct) in setting of hypertensive emergency, and R cerebellar stroke transferred to [facility] for further neurosurgical care. 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 Epilepsy recalled as pt with vertical eye movements that appeared rhythmic and resolved after given AM Keppra +additional 500mg. Dilantin level 3.3. High suspicion for continued focal seizures, however given location of the bleed, suspect that repeating EEG will be limited utility. Unclear if EEG negative focal seizures or hemifacial spasm from pontine irritation Given low therapeutic level, will recommend increasing Dilantin and monitoring clinically for progression as will treat both.    Recommendations:  - Continue Keppra 1500mg Q12hrs  - Increase Phenytoin to 100/100/150mg.   - Recheck phenytoin level 1/23 AM  - Maintain seizure/fall/aspiration precautions    Discussed with Dr. Farley

## 2025-01-21 NOTE — PROGRESS NOTE ADULT - SUBJECTIVE AND OBJECTIVE BOX
O/N Events: GEORGE  Subjective/ROS: With rhythmic facial twitch    VITALS  Vital Signs Last 24 Hrs  T(C): 37.2 (21 Jan 2025 13:50), Max: 37.3 (21 Jan 2025 05:05)  T(F): 98.9 (21 Jan 2025 13:50), Max: 99.2 (21 Jan 2025 05:05)  HR: 82 (21 Jan 2025 12:08) (76 - 98)  BP: 128/89 (21 Jan 2025 12:08) (128/89 - 151/106)  BP(mean): 104 (21 Jan 2025 12:08) (104 - 125)  RR: 16 (21 Jan 2025 12:08) (14 - 17)  SpO2: 98% (21 Jan 2025 12:08) (96% - 99%)    Parameters below as of 21 Jan 2025 12:08  Patient On (Oxygen Delivery Method): ventilator    O2 Concentration (%): 40    I&O's Summary    20 Jan 2025 07:01  -  21 Jan 2025 07:00  --------------------------------------------------------  IN: 2240 mL / OUT: 1650 mL / NET: 590 mL    21 Jan 2025 07:01  -  21 Jan 2025 16:02  --------------------------------------------------------  IN: 440 mL / OUT: 690 mL / NET: -250 mL        CAPILLARY BLOOD GLUCOSE      POCT Blood Glucose.: 120 mg/dL (20 Jan 2025 16:16)      PHYSICAL EXAM  General: minimally responsive on ventilator, NAD, no labored breathing   HEENT: trach collar in place, clean  Lungs: anterior exam, limited, no crackles, no wheezes  Heart: regular  Abdomen: soft, no distension, + BS  Extremities:  warm, trace edema, not following commands     MEDICATIONS  (STANDING):  acetylcysteine 10%  Inhalation 4 milliLiter(s) Inhalation every 6 hours  albuterol/ipratropium for Nebulization 3 milliLiter(s) Nebulizer every 6 hours  amLODIPine   Tablet 5 milliGRAM(s) Oral daily  artificial tears (preservative free) Ophthalmic Solution 1 Drop(s) Right EYE every 4 hours  baclofen 5 milliGRAM(s) Oral every 12 hours  chlorhexidine 0.12% Liquid 15 milliLiter(s) Oral Mucosa every 12 hours  chlorhexidine 2% Cloths 1 Application(s) Topical <User Schedule>  doxazosin 8 milliGRAM(s) Oral at bedtime  erythromycin   Ointment 1 Application(s) Right EYE at bedtime  fluticasone propionate 50 MICROgram(s)/spray Nasal Spray 1 Spray(s) Both Nostrils two times a day  heparin   Injectable 5000 Unit(s) SubCutaneous every 8 hours  influenza   Vaccine 0.5 milliLiter(s) IntraMuscular once  levETIRAcetam 1500 milliGRAM(s) Oral two times a day  ofloxacin 0.3% Solution 1 Drop(s) Right EYE four times a day  pantoprazole  Injectable 40 milliGRAM(s) IV Push daily  petrolatum Ophthalmic Ointment 1 Application(s) Both EYES two times a day  phenytoin   Suspension 100 milliGRAM(s) Oral every 8 hours  propranolol 5 milliGRAM(s) Oral every 12 hours  sodium chloride 0.65% Nasal 1 Spray(s) Both Nostrils two times a day    MEDICATIONS  (PRN):  acetaminophen   Oral Liquid .. 650 milliGRAM(s) Oral every 6 hours PRN Temp greater or equal to 38C (100.4F), Mild Pain (1 - 3)      Allergy Status Unknown      LABS                        10.5   7.05  )-----------( 194      ( 21 Jan 2025 05:30 )             31.3     01-21    138  |  100  |  38[H]  ----------------------------<  103[H]  3.7   |  25  |  0.95    Ca    8.9      21 Jan 2025 05:30  Phos  3.2     01-21  Mg     2.0     01-21        Urinalysis Basic - ( 21 Jan 2025 05:30 )    Color: x / Appearance: x / SG: x / pH: x  Gluc: 103 mg/dL / Ketone: x  / Bili: x / Urobili: x   Blood: x / Protein: x / Nitrite: x   Leuk Esterase: x / RBC: x / WBC x   Sq Epi: x / Non Sq Epi: x / Bacteria: x            IMAGING/EKG/ETC: reviewed

## 2025-01-21 NOTE — PROGRESS NOTE ADULT - SUBJECTIVE AND OBJECTIVE BOX
Neurology Progress Note    Interval History:  From neurosurgery team, pt had rapid vertical eye movement that appeared rhythmic this morning of unclear duration. The patient was given an additional Keppra 500mg PO x1 with resolution of his symptoms. The patient was seen and examined at the bedside. Pt apparently able to communicate by looking up and closing eyes "yes" and "no". He appeared to track writer on the R side, questionably so on the L side. On asking if he was in pain, appeared to close his eyes "no" and on asking his name with choices appeared to look up, but difficult to ascertain given his facial twitching.       PAST MEDICAL & SURGICAL HISTORY:  Acute myocardial infarction    CVA (cerebral vascular accident)            Medications:  acetaminophen   Oral Liquid .. 650 milliGRAM(s) Oral every 6 hours PRN  acetylcysteine 10%  Inhalation 4 milliLiter(s) Inhalation every 6 hours  albuterol/ipratropium for Nebulization 3 milliLiter(s) Nebulizer every 6 hours  amLODIPine   Tablet 5 milliGRAM(s) Oral daily  artificial tears (preservative free) Ophthalmic Solution 1 Drop(s) Right EYE every 4 hours  baclofen 5 milliGRAM(s) Oral every 12 hours  chlorhexidine 0.12% Liquid 15 milliLiter(s) Oral Mucosa every 12 hours  chlorhexidine 2% Cloths 1 Application(s) Topical <User Schedule>  doxazosin 8 milliGRAM(s) Oral at bedtime  erythromycin   Ointment 1 Application(s) Right EYE at bedtime  fluticasone propionate 50 MICROgram(s)/spray Nasal Spray 1 Spray(s) Both Nostrils two times a day  heparin   Injectable 5000 Unit(s) SubCutaneous every 8 hours  influenza   Vaccine 0.5 milliLiter(s) IntraMuscular once  levETIRAcetam 1500 milliGRAM(s) Oral two times a day  ofloxacin 0.3% Solution 1 Drop(s) Right EYE four times a day  pantoprazole  Injectable 40 milliGRAM(s) IV Push daily  petrolatum Ophthalmic Ointment 1 Application(s) Both EYES two times a day  phenytoin   Suspension 100 milliGRAM(s) Oral every 8 hours  propranolol 5 milliGRAM(s) Oral every 12 hours  sodium chloride 0.65% Nasal 1 Spray(s) Both Nostrils two times a day      Vital Signs Last 24 Hrs  T(C): 37.2 (21 Jan 2025 13:50), Max: 37.3 (21 Jan 2025 05:05)  T(F): 98.9 (21 Jan 2025 13:50), Max: 99.2 (21 Jan 2025 05:05)  HR: 82 (21 Jan 2025 12:08) (76 - 98)  BP: 128/89 (21 Jan 2025 12:08) (128/89 - 151/106)  BP(mean): 104 (21 Jan 2025 12:08) (104 - 125)  RR: 16 (21 Jan 2025 12:08) (14 - 17)  SpO2: 98% (21 Jan 2025 12:08) (96% - 99%)    Parameters below as of 21 Jan 2025 12:08  Patient On (Oxygen Delivery Method): ventilator    O2 Concentration (%): 40    Neurological Exam:   Mental status: Awake, occasionally briefly tracking interviewer. On asking his name with choices appeared to look up, but difficult to ascertain given his facial twitching. Intermittently follows commands to close and open eyes.  +Blink to threat on L eye only. left eyelid and lip be twitching.  No withdrawal to painful stimuli in B/L U&LE. DTRs 3+ throughout.       Labs:  CBC Full  -  ( 21 Jan 2025 05:30 )  WBC Count : 7.05 K/uL  RBC Count : 3.39 M/uL  Hemoglobin : 10.5 g/dL  Hematocrit : 31.3 %  Platelet Count - Automated : 194 K/uL  Mean Cell Volume : 92.3 fl  Mean Cell Hemoglobin : 31.0 pg  Mean Cell Hemoglobin Concentration : 33.5 g/dL  Auto Neutrophil # : x  Auto Lymphocyte # : x  Auto Monocyte # : x  Auto Eosinophil # : x  Auto Basophil # : x  Auto Neutrophil % : x  Auto Lymphocyte % : x  Auto Monocyte % : x  Auto Eosinophil % : x  Auto Basophil % : x    01-21    138  |  100  |  38[H]  ----------------------------<  103[H]  3.7   |  25  |  0.95    Ca    8.9      21 Jan 2025 05:30  Phos  3.2     01-21  Mg     2.0     01-21          Urinalysis Basic - ( 21 Jan 2025 05:30 )    Color: x / Appearance: x / SG: x / pH: x  Gluc: 103 mg/dL / Ketone: x  / Bili: x / Urobili: x   Blood: x / Protein: x / Nitrite: x   Leuk Esterase: x / RBC: x / WBC x   Sq Epi: x / Non Sq Epi: x / Bacteria: x        Assessment:  This is a 44y Male w/ h/o     Plan:

## 2025-01-21 NOTE — PROGRESS NOTE ADULT - TIME BILLING
Bedside exam and interview   Reviewed vitals, labs   Discussed patient's plan of care with house staff   Documentation of encounter    Time documented on encounter excludes teaching and separately reported services

## 2025-01-21 NOTE — PROGRESS NOTE ADULT - SUBJECTIVE AND OBJECTIVE BOX
HPI:  Unknown age male, unknown past medical history (reported stroke and MI by coworkers) presented to [facility] with AMS, Pt was working at [employer] when he was found down by coworkers. EMS called and pt brought to [facility] ED. Intubated, sedated, started on cardene for SBPs in 200s. CT head showed brain stem bleed. Transferred to NSICU for further management.  (30 Sep 2024 12:55)    INTERVAL EVENTS: GEORGE ovn    Hospital course:   9/30: transferred from [facility]. A line placed. Versed dc'd. Cy Rader at bedside, states pt has family in [place], cannot confirm medications or PMH other than stroke and MI. 250cc bolus 3% given. LR switched to NS. hydralazine 25q8 started, 3% started, switched propofol to precedex   10/1: stability CTH done. Added labetalol, started TF. Palliative consulted. ethics consulted to determine surrogate. febrile 103, pan cx sent  10/2: BD 2, GEORGE overnight. TF resumed. Desatt'd to 80s, FiO2 inc. to 50. Fentanyl given, ABG, CXR ordered. Maxxed on precedex, started on propofol for DARIEN -4 - -5. Precedex dc'd. Duonebs, mucomyst, hypertonic added. 3% dc'd. Cardene dc'd. Start vanc/CTX. Increased labetalol 200q8. MRSA negative, dc'd vanc. ETT pulled back 2cm x 2, good positioning after confirmatory chest xray. Ethics attempting to establish HCP with family. Na 159, starting FW 250q6 for range 150-155.   10/3: BD3, GEORGE o/n, neuro stable. Na elevating, FW increased to 300q6. Dc'd bowel reg for diarrhea. vEEG started. SQH 5000q8 tonight.   10/4: BD 4, albumn bolus, incr. LR to 80 2/2 incr. in Cr, LR to 10 0cc/hr for uptrending Cr. Started 7% hypersal for 48hrs and SL atropine for inline/oral thick secretions. Dc'd CTX and started ancef for MSSA in the sputum. Nephrology consulted for CKD, f/u recs. SBP 170s, given hydralazine 10mg IVP.   10/5: BD5, o/n 10mg IVP hydralazine given for SBP 170s and started on hydralazine 25q8 via OGT. 10mg IV push labetalol for SBP > 160s. RT placed for diarrhea.   10/6: BD6, o/n FW increased to 350q4 per nephrology recs. IV tylenol for temp 100.6, SBp 160s presumed uncomfortable.   10/7: BD7, overnight pancultured for temp 101.8F.   10/8: BD8. GEORGE. Cr bumped. decreased LR to 75cc/hr. Adding simethicone ATC. incr hydralazine 50mgTID. Incr labetalol 300mgTID. Na 145, decreased FWF to 250q6. Start precedex. FENa consistent with intrinsic kidney injury. Pend repeat renal US. Retaining up to 1.3L, bladder scans q6, straight cath PRN  10/9: BD 9. GEORGE overnight. Neuro stable. abd xray for distention w non-specific gas pattern, OGT to LIWS for morning. duonebs/mucomyst to q8 for improving secretions. Changed tube feeds to Jevity 1.5 20cc/hr, low rate due to abdominal distention, nepro dense and more difficult to digest. Tolerating CPAP, confirmed by ABG.   10/10: BD 10. GEORGE overnight. Neuro stable. (+) gabriel for urinary retention on bladder scan. inc TF to goal rate of 40cc/hr. family leaning toward pursuing trach/PEG. 1/2 amp for FS 81.   10/11: BD 11. GEORGE overnight. Neuro stable. Trach/PEG consults placed.   10/12: BD 12. GEORGE overnight. Neuro stable. MRI brain complete.   10/13: BD 13. Increase flomax. Hold SQH after PM dose for trach tm. IVL.   10/14: BD 14. GEORGE overnight, remains on AC/VC. Gabriel placed for urinary retention. Dc'd free water.  S/p trach with pulm. NGT placed and CXR confirmed in good position.   10/15: BD 15, GEORGE ovn. resumed feeds. spiked 101, pan cx sent.   10/16: BD 16. GEORGE ovn. Lokelma 5mg for K+ 5.4. Started vanc q 24/zosyn for empiric PNA coverage, IVF to 100/hr. PEG held for fever.   10/17: BD 17,  ordered serum osm and urine osm for am. Started sinemet for neurostimulation. Increased cardura to 0.8. Started FW 100q4, dc'd IVF. MRSA negative, dc'd vanc. NGT replaced d/t coiling.   10/18: BD 18, GEORGE overnight, neuro stable. Amantadine added for neurostim. zosyn changed to unasyn for acinetobacter baumannii, failed TOV and required SC  10/19: BD 19, GEORGE ovn. cardura 2mg added for retention. labetalol decreased 200q8, hydralazine decreased 25q8. Gabriel replaced.   10/20: BD20, GEORGE overnight. NGT dislodged, replaced. PEG tomorrow w/ gen surg, FW increased to 150q4 and labetalol decreased to 100q8, lokelma given for hyperkalemia.   10/21: BD 21. POD0 PEG placement with Gen surg. decr labetolol to 50q8, incr. cardura to 0.4, started lokelma and phoslo, dc gabriel POD0 PEG placement with Gen surg.  10/22: BD 22. Plan to start TF today via PEG. dc labetalol, Following ophtho recs. Increased apnea settings - found to be in cheyne-moe respiration. CPAP 5/5.  10/23: BD 23. hydralazine d/c'd, trach collar trial today. Rectal tube placed at 6am.  10/24: BD24, o/n lokelma held due to diarrhea. Free water 100q6 resumed. dc'd tamsulosin, amantadine. Incr'd cardura to 8mg qhs. Dc'd FW. Switched jevity to nepro. gabriel placed for high urine output. Started SL atropine for oral secretions. Dc'd free water.  10/25: BD25, o/n decreased suctioning requirements to > q4hrs, GEORGE. Cr improving, cont phoslo, lokelma held at this time. Gabriel placed yest, cont. Tolerating trach collar. Given 500cc plasmalyte bolus for ANIKA. Dc'd sinemet.   10/26: BD26, o/n resumed lokelma 5mg daily and resumed 100cc free water q6hrs. Change in neuro status with new right pupillary dilation with anisocoria (right pupil 6mm fixed and left pupil 3mm briskly reactive). Given 23.4% NaCl bullet, taken for emergent CTH showing mostly resolved pontine hemorrhage, continued brainstem hypodensity likely edema d/t hemorrhage, no new hemorrhage or infarct, no herniation, mild increase in size of left lateral ventricle. Vitals remaine stable. Na goal > 140.   10/27: BD27, o/n GEORGE.Neuro stable. Pend stepdown with airway bed.   10/28: BD 28. GEORGE overnight. Neuro stable. Miralax ordered. Gabriel removed, pending TOV.  10/29: BD 29. GEORGE o/n. Given 2L NS over 8 hrs for increased BUN/Cr ratio. Gabriel placed for frequent straight cath.   10/30: BD 30.   10/31: BD 31. GEORGE overnight. Na 149, increased free water to 200q6. 1L NS for uptrending BUN.   11/1: BD 32. GEORGE overnight. Given 1L NS for dehydration. Na 146, increased FW to 250q6.   11/2: BD 33 GEORGE overnight, neuro stable, given 500cc bolus for net negative status and tachycardia   11/3: BD 34, GEORGE overnight, neuro stable. Patient remains tachycardic, EKG showing sinus tachycardia, given additional 500cc NS bolus. Febrile to 101.9F, pan cultured (without UA), CXR WNL, given tylenol.   11/4: BD 35, GEORGE overnight, neuro stable. Given 1L NS for tachycardia. sputum (+) for stenotropohomas maltophilia.   11/5: BD 36 GEORGE overnight, neuro stable. Vancomycin dc'd. Chest PT BID. ID consulted, cont zosyn.  11/6: BD 37. blood cx + klebsiella dc zosyn changed to cefepime, CTAP ordered, rpt blood cx sent.    11/7: BD 38. Pending CT A/P, given 250cc bolus and starting maintenance fluids overnight. Pending CT A/P after bolus   11/8: BD 39. CT CAP negative for infection.   11/9: BD 40. GEORGE overnight.  11/10: BD 41. GEORGE overnight. desat to 85 on trach collar, O2 inc to 10L and 100%, O2 sat inc to 95. pt tachy to 110s, euvolemic. given tylenol. ABG and CXR ordered. spiked fever, pancultured, RVP negative. AM ABG w pO2 79, rpt w pO2 79. pt appears comfortable, satting 94%.   11/11: BD 42. GEORGE overnight. pt became tachy to 130s, desat to 90 on 100% FiO2 and 10L. suctioned, (+) productive cough. temp 101.4, given 1g IV tylenol and 500cc NS bolus for euvolemia. fever and HR downtrending. LE dopplers negative for dvt  11/12: BD 43, GEORGE ovn, fever and HR downtrending, satting 97% 70% FIO2  11/13: BD 44, GEORGE ovn. started standing tylenol x24 hours for tachycardia. desat to 80s, o2 increased. CXR stable, pending CTA PE protocol.   11/14: BD 45, GEORGE overnight, neuro stable. resp therapy dec FiO2 to 70%.   11/15: BD 46, GEORGE overnight, neuro stable.  Rapid called for desaturation 30s, tachycardic 140s. Patient bagged, 100% fio2, heavily suctioned. CXR/POCUS unremarkable. ABG c/w desaturation. WBC 14.71. Afebrile. O2 improved to 90s and patient upgraded to ICU. ABG paO2 30s improved to 89 on vent. IV Tylenol x 1, sputum sent. Start protonix while o-n vent.   11/16: BD 47. POCUS showed collapsable IVCF, given 1L bolus. Vanco/Cefpime added empriic for PNA, NGT feeds restarted. MRSA swab neg, Vanc DC'd.   11/17: BD 48. GEORGE overnight. 1l bolus for tachycardia. Spiked to 101, cultured. 500cc bolus for tachycardia, tachy to 148 given 25mcg fentanyl, 250cc albumin, 1.5L bolus. 5 IV lopressor with response HR to 100s. +Stenotrophomonas on sputum cx.   11/18: BD 49. GEORGE overnight. Consulted ID, cefepime switched to bactrim x7days. Started hydrochlorothiazine 12.5mg daily.  11/19: BD 50. Tachy 120s, given tylenol and 500cc NS. Tolerating 5/5, switched to TCx. Holding phos binder. D/c Bactrim. D/c gabriel, f/u TOV. Dc'd PPI.   11/20: BD 51. GEORGE ovn. 1600 satting low 90s, mildly tachy to 110s, afebrile, RR wnl. O2 improved to mid 90s while inc O2 to 100% on TCx. CPAP 5/5 placed back on.  11/21: BD 52, GEORGE ovn, tolerating CPAP 5/5. Switch to trach collar during the day if tolerating well. HCTZ held for Cr bump, straight cath frequence increased to q4  11/22: BD 53, GEORGE ovn. Resumed phoslo. Gabriel placed. Resumed HCTZ.   11/23: BD 54. Holding tylenol in setting of possible fever, will require pan cx if febrile. Cr improved today. Cont CPAP. Bowel regimen held i/s/o diarrhea. FOBT negative.  11/24: BD 55. GEORGE overnight. Neuro stable. HCTZ dc'ed, started lisinopril 5. Lokelma dc'ed for K 3.7.   11/25: BD 56, GEORGE overnight. Neuro stable. dc'd lisinopril 5mg. Gabriel dc'd. TOV. 1545 noted to be hypotensive, MAP 50, in supine position on chair, HR 60s, afebrile, O2 96%. Given 1L cc NS bolus, placed back on bed in reverse trendelenberg, improved to map of 66. Neostick at bedside. Vitals check q1h. Dc'ed amlodipine. Failed TOV, bladder scan q6, sc prn. Added back Senna.   11/26: BD 57, GEORGE overnight. Neuro stable. Dc'd phoslo.   11/27: BD 58, GEORGE overnight. Neuro stable.    11/28: BD 59. Gabriel replaced.   11/29: GEORGE.  11/30: GEORGE, neuro stable.   12/1: BD 62, GEORGE overnight  12/2: BD 63, GEORGE overnight.; Given 1L bolus of LR for uptrending BUN/Cr.  12/3: BD 64. Reinstated eye gtt/moisture chamber given increased Rt eye injection  12/4: BD 65. GEORGE overnight. Attempted to speak with ophtho regarding eyelid weight/closure but no answer, full mailbox.   12/5: BD 66, GEORGE overnight, bowel regimen increased and had BM.   12/6: BD 67, GEORGE overnight, neuro stable.  12/7: GEORGE overnight, neuro stable. /110s, given x1 hydralazine 10 mg IVP. Restarted home amlodipine 5mg.  12/8: GEORGE. OOB to chair.     12/11: GEORGE, mucomyst added for thick secretions, simethicone for abd distension, abd xray with stool burden, increased bowel regimen.   12/12: GEORGE overnight.   12/13: GEORGE overnight.   12/14: GEORGE overnight  12/15: o/n Patient became tachycardic HR 120s and 10 minutes later O2 sat dropped as low as 89%. Patient suctioned without improvement in O2 sat and tube feeds found in suction catheter. TFs held.  STAT CXR ordered. STAT labs sent. Respiratory therapist called to bedside and patient trach connected to ventilator. After connection to ventilator and further suctioning O2 sat improved to 97% but patient HR remains 120-130s. Upgraded to NSICU for further management. Vancomycin and zosyn started. CTA  chest PE protocol and CTH ordered. Blood cultures sent.given 500cc bolus, rpt ABG sent pO2 243, CTH and CTA chest done. FS while NPO, FiO2 dec 50 pending ABG. sputum cx positive for few GPC and GNR.   12/16: GEORGE overnight, restarted amlodipine, troponin 75   12/17: GEORGE overnight, neuro stable. Attempted CPAP this morning, did not tolerate and back on full vent support. Dc'd Vanc. Tachycardia to 140s, noted extremities to be twitching along with jaw twitching. Given 2g of Keppra, total 4mg ativan and placed on EEG, full set of labs and lactate negative. Resumed trickle feeds at 20cc/hr. Given 250cc albumin for tachycardia.   12/18: GEORGE overnight. Neuro stable. CTH stable. EEG negative and dc'd.   12/19: GEORGE overnight. neuro stable. SIMV most of day, AC/VC at night.   12/20: GEORGE overnight, neuro stable. remains on VC/AC  12/21: GEORGE ovn. 1L bolus for tachy to 120s-130s.   12/22: GEORGE ovn. Tachy to 120s, given tylenol and IVF. 2mg IV ativan given for L jaw twitching. Sx resolved for 3 minutes and started again. Epilepsy contacted. Given 2mg IV ativan. Continued twitching. Increased keppra to 1500mg BID, 2mg ativan given. Propranolol started for refractory tachycardia. vEEG ordered. albumin given. POCUS performed, no b lines. Started on fosphenytoin, loaded w 20mg/kg then 100mg TID. febrile, pancx, started cefepime 2g q8, vancomycin  12/23: GEORGE ovn. +focal motor seizures on eeg. ID consulted. Vancomycin dc'ed as per ID.  12/24: GEORGE ovn, neuro stable. Baclofen 5 mg q12 started for hiccups. Na 129 from 134. Urine lytes c/w SIADH. Given 250cc 3% bolus. CT chest for infection w/u - f/u read. Repeat Na 136. UA negative  12/25: GEORGE ovn.   12/26: GEORGE ovn  12/27: GEORGE overnight. Blood cx neg @ 4 days, DC cefepime per medicine (sputum colonized).   12/28: Desaturation o/n to 80's, CXR obtained, pulse ox changed and sats resolved. Na 133 from 138, 250cc 3% bolus given. Cefepime resumed until 12/30 per ID. Rpt Na 138.  12/29: GEORGE overnight. Na stable.   12/30: GEORGE overnight. Family meeting with son, pt now DNR.   12/31: GEORGE overnight.   1/1: GEORGE overnight, neuro stable.  1/2: GEORGE overnight, neuro stable. FW decreased to 100q6.   1/3: GEORGE overnight, neuro stable. fosphenytoin IV changed to pheytoin PO via PEG per epilepsy recommendations  1/4: GEORGE overnight, neuro stable. FW incr. to 100q4  1/5: GEORGE overnight, neuro stable.   1/6: GEORGE overnight, neuro stable   1/7: GEORGE overnight, neuro stable. BUN/Cr increased, increased FW to 200q6. Given 1L bolus of LR over 2 hours. Gabriel d/c'd, voiding, continue bladder scan q6.   1/8: GEORGE overnight, neuro stable. BUN/Cr improving.  1/9: GEORGE overnight, neuro stable.   1/10: GEORGE overnight, neuro stable.   1/11: GEORGE overnight, neuro stable, vent settings stable.   1/12: GEORGE overnight, neuro stable. Febrile to 102F, f/u pan cx, chest xray, given tylenol. Zosyn started.   1/13: GEORGE overnight, neuro stable, cont Zosyn for presumed PNA, prelim sputum cx growing; few GS neg diplococci, mod GS neg rods, rare GS + rods, fever trend improved. Free water increased to [id number].   1/14: GEORGE overnight. pending renal US for elevated Cr  1/15: GEORGE ovn. renal US complete.   1/16: GEORGE overnight, neuro stable   1/17: GEORGE overnight, neuro stable   1/18: GEORGE overnight, neuro stable.   1/19: GEORGE overnight, neuro stable. Propranolol dec to 5q8.   1/20: GEORGE overnight, neuro stable. Weaned propranolol to 5mg BID.   1/21: GEORGE ovn    Vital Signs Last 24 Hrs  T(C): 37.1 (20 Jan 2025 22:08), Max: 37.1 (20 Jan 2025 22:08)  T(F): 98.8 (20 Jan 2025 22:08), Max: 98.8 (20 Jan 2025 22:08)  HR: 80 (20 Jan 2025 20:20) (60 - 82)  BP: 134/96 (20 Jan 2025 20:20) (116/76 - 147/97)  BP(mean): 111 (20 Jan 2025 20:20) (91 - 118)  RR: 16 (20 Jan 2025 20:20) (14 - 17)  SpO2: 98% (20 Jan 2025 20:20) (97% - 100%)    Parameters below as of 20 Jan 2025 20:20  Patient On (Oxygen Delivery Method): ventilator    O2 Concentration (%): 40    I&O's Summary    19 Jan 2025 07:01  -  20 Jan 2025 07:00  --------------------------------------------------------  IN: 2440 mL / OUT: 1575 mL / NET: 865 mL    20 Jan 2025 07:01  -  20 Jan 2025 22:26  --------------------------------------------------------  IN: 1560 mL / OUT: 1425 mL / NET: 135 mL        PHYSICAL EXAM:  Constitutional: Pt found in bed in NAD   ENT: PERRL, vertical EOMi  Respiratory: +trach, breathing non-labored, symmetrical chest wall movement  Cardiovascuar: RRR, no murmurs  Gastrointestinal: +PEG, abdomen soft, non tender  Neurological:  AAOX2 with squeezing R hand communication. Verbal function not intact, intermittently follows commands   Motor: RUE handgrip to command 2/5, RLE trace wiggles toes to command, LUE 0/5, LLE withdraws to noxious   Pronator Drift: unable to assess      DIET:  [] NPO  [] Mechanical  [x] Tube feeds    LABS:                  CAPILLARY BLOOD GLUCOSE      POCT Blood Glucose.: 120 mg/dL (20 Jan 2025 16:16)      Drug Levels: [] N/A    CSF Analysis: [] N/A      Allergies    Allergy Status Unknown    Intolerances        Home Medications:      MEDICATIONS:  MEDICATIONS  (STANDING):  acetylcysteine 10%  Inhalation 4 milliLiter(s) Inhalation every 6 hours  albuterol/ipratropium for Nebulization 3 milliLiter(s) Nebulizer every 6 hours  amLODIPine   Tablet 5 milliGRAM(s) Oral daily  artificial tears (preservative free) Ophthalmic Solution 1 Drop(s) Right EYE every 4 hours  baclofen 5 milliGRAM(s) Oral every 12 hours  chlorhexidine 0.12% Liquid 15 milliLiter(s) Oral Mucosa every 12 hours  chlorhexidine 2% Cloths 1 Application(s) Topical <User Schedule>  doxazosin 8 milliGRAM(s) Oral at bedtime  erythromycin   Ointment 1 Application(s) Right EYE at bedtime  fluticasone propionate 50 MICROgram(s)/spray Nasal Spray 1 Spray(s) Both Nostrils two times a day  heparin   Injectable 5000 Unit(s) SubCutaneous every 8 hours  influenza   Vaccine 0.5 milliLiter(s) IntraMuscular once  levETIRAcetam 1500 milliGRAM(s) Oral two times a day  ofloxacin 0.3% Solution 1 Drop(s) Right EYE four times a day  pantoprazole  Injectable 40 milliGRAM(s) IV Push daily  petrolatum Ophthalmic Ointment 1 Application(s) Both EYES two times a day  phenytoin   Suspension 100 milliGRAM(s) Oral every 8 hours  propranolol 5 milliGRAM(s) Oral every 12 hours  sodium chloride 0.65% Nasal 1 Spray(s) Both Nostrils two times a day    MEDICATIONS  (PRN):  acetaminophen   Oral Liquid .. 650 milliGRAM(s) Oral every 6 hours PRN Temp greater or equal to 38C (100.4F), Mild Pain (1 - 3)      CULTURES:  Culture Results:   >=3 organisms. Probable collection contamination. (01-12 @ 15:25)  Culture Results:   No growth at 5 days (01-12 @ 12:50)      RADIOLOGY & ADDITIONAL TESTS:      ASSESSMENT:  44M PMH ?stroke/MI present to [facility] after collapsing at work. Decorticate posturing, vomiting, intubated for airway protection. 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 Transferred to [facility] for further management. s/p trach 10/14. s/p peg 10/21. Re-upgrade to ICU 2/2 desaturation event and suctioning requirements 11/15. Re-upgrade to NSICU 12/15 2/2 desaturation and tachycardia.    Neuro:  - neuro/vitals q4h  - pain control: tylenol prn  - seizure tx: keppra 1500mg BID, phenytoin 100mg PO TID  - vEEG (10/3-4) negative, (10/17-10/19) negative, (12/17-12/18) negative, (12/22-12/25) +focal motor seizures not correlating w/ EEG  - CTH 9/30: enlarged pontine hemorrhage, CTH 10/3: stable, CTH 10/25: mostly resolved pontine hemorrhage, CTH 12/15: R mastoid air cell opacification; acute otitis media vs sterile effusion, CTH 12/18: stable.  - MRI brain 10/12: parenchymal hemorrhage, acute/subacute R cerebellar stroke      CV:  - -160  - tachycardia: propranolol 5mg BID   - HTN: amlodipine 5mg  - echo (9/30) EF 75%, repeat 12/16: 57%    Resp:  - trach to vent, AC/VC 40/400/14/6  - Secretions: duonebs/mucomyst/chest PT q6h     GI:  - TF via PEG (placed 10/21 by gen surg)  - bowel regimen held for loose stool, last BM 1/19  - baclofen 5q12 for hiccups    Renal:  - IVL, FW 200q4 for hydration   - Urinary retenion: Gabriel removed 1/7, Voiding  - CKD: trend BUN/Cr  - renal US 10/1, 10/8, 1/15: Increased bilateral renal echogenicity consistent with medical renal disease    Endo:  - A1c 5.4    Heme:  - DVT ppx: SCDs, SQH 5000u q8h   - LE dopplers negative 12/16    ID:  - last pan cx 1/12   - empiric PNA: cefepime (12/22-12/30), s/p vanc (12/22-12/23), s/p zosyn (12/15-12/20), s/p vanc (12/15-12/16), s/p zosyn (1/12 -1/18)  - s/p Stenotrophomonas maltophilia PNA: s/p Bactrim (11/18-11/19) s/p Cefepime (11/16-11/18)  - 11/3, (+) sputum for stenotrophomas maltophlia, blood cx (+) klebsiella, cefepime 2gq12 (11/6 - 11/12)   - empiric tx: s/p zosyn (11/3-11/6) , s/p vanc  (11/3-11/5)  - S/p Ancef (10/4-10/14) for PNA, and s/p Unasyn (10/18-10/23) +actinobacter baumanii     MISC:  - ophtho consult for keratitis  - erythromycin ointment R eye q4hrs, ofloxacin ointment R eye QID, artificial tears R eye q2hrs, moisture chamber at bedtime    Dispo: SDU status, DNR, pending PRUCOL for benefits     D/w Dr. D'Amico     Assessment: present when checked     [] GCS   E   V   M     Heart Failure: [] Acute, [] acute on chronic, [] chronic   Heart Failure: [] Diastolic (HFpEF), [] Systolic (HRrEF), [] Combined (HFpEF and HFrEF), [] RHF, [] Pulm HTN, [] Other     [] ANIKA, [] ATN, [] AIN, [] other   [] CKD1, [] CKD2, [] CKD3, [] CKD4, [] CKD5, [] ESRD     Encephalopathy: [] Metabolic, [] Hepatic, [] Toxic, [] Neurological, [] Other     Abnormal Nutritional Status: [] malnutrition (see nutrition note), []underweight: BMI <19, [] morbid obesity: BMI >40, [] Cachexia     [] Sepsis   [] Hypovolemic shock, [] Cardiogenic shock, [] Hemorrhagic shock, [] Neurogenic shock   [] Acute respiratory failure   [] Cerebral edema, [] Brain compression / herniation   [] Functional quadriplegia   [] Acute blood loss anemia

## 2025-01-22 LAB
ANION GAP SERPL CALC-SCNC: 13 MMOL/L — SIGNIFICANT CHANGE UP (ref 5–17)
BUN SERPL-MCNC: 38 MG/DL — HIGH (ref 7–23)
CALCIUM SERPL-MCNC: 9.3 MG/DL — SIGNIFICANT CHANGE UP (ref 8.4–10.5)
CHLORIDE SERPL-SCNC: 97 MMOL/L — SIGNIFICANT CHANGE UP (ref 96–108)
CO2 SERPL-SCNC: 25 MMOL/L — SIGNIFICANT CHANGE UP (ref 22–31)
CREAT SERPL-MCNC: 1 MG/DL — SIGNIFICANT CHANGE UP (ref 0.5–1.3)
EGFR: 94 ML/MIN/1.73M2 — SIGNIFICANT CHANGE UP
EGFR: 94 ML/MIN/1.73M2 — SIGNIFICANT CHANGE UP
GLUCOSE SERPL-MCNC: 105 MG/DL — HIGH (ref 70–99)
HCT VFR BLD CALC: 35.7 % — LOW (ref 39–50)
HGB BLD-MCNC: 11.1 G/DL — LOW (ref 13–17)
MAGNESIUM SERPL-MCNC: 2.1 MG/DL — SIGNIFICANT CHANGE UP (ref 1.6–2.6)
MCHC RBC-ENTMCNC: 29.8 PG — SIGNIFICANT CHANGE UP (ref 27–34)
MCHC RBC-ENTMCNC: 31.1 G/DL — LOW (ref 32–36)
MCV RBC AUTO: 96 FL — SIGNIFICANT CHANGE UP (ref 80–100)
NRBC # BLD: 0 /100 WBCS — SIGNIFICANT CHANGE UP (ref 0–0)
NRBC BLD-RTO: 0 /100 WBCS — SIGNIFICANT CHANGE UP (ref 0–0)
PHOSPHATE SERPL-MCNC: 3.6 MG/DL — SIGNIFICANT CHANGE UP (ref 2.5–4.5)
PLATELET # BLD AUTO: 184 K/UL — SIGNIFICANT CHANGE UP (ref 150–400)
POTASSIUM SERPL-MCNC: 3.4 MMOL/L — LOW (ref 3.5–5.3)
POTASSIUM SERPL-SCNC: 3.4 MMOL/L — LOW (ref 3.5–5.3)
RBC # BLD: 3.72 M/UL — LOW (ref 4.2–5.8)
RBC # FLD: 13.8 % — SIGNIFICANT CHANGE UP (ref 10.3–14.5)
SODIUM SERPL-SCNC: 135 MMOL/L — SIGNIFICANT CHANGE UP (ref 135–145)
WBC # BLD: 6.97 K/UL — SIGNIFICANT CHANGE UP (ref 3.8–10.5)
WBC # FLD AUTO: 6.97 K/UL — SIGNIFICANT CHANGE UP (ref 3.8–10.5)

## 2025-01-22 PROCEDURE — 99233 SBSQ HOSP IP/OBS HIGH 50: CPT

## 2025-01-22 RX ADMIN — Medication 1 DROP(S): at 13:17

## 2025-01-22 RX ADMIN — Medication 40 MILLIEQUIVALENT(S): at 11:36

## 2025-01-22 RX ADMIN — Medication 100 MILLIGRAM(S): at 19:50

## 2025-01-22 RX ADMIN — Medication 1 SPRAY(S): at 17:41

## 2025-01-22 RX ADMIN — Medication 1 APPLICATION(S): at 17:41

## 2025-01-22 RX ADMIN — IPRATROPIUM BROMIDE AND ALBUTEROL SULFATE 3 MILLILITER(S): .5; 2.5 SOLUTION RESPIRATORY (INHALATION) at 23:27

## 2025-01-22 RX ADMIN — HEPARIN SODIUM 5000 UNIT(S): 1000 INJECTION INTRAVENOUS; SUBCUTANEOUS at 05:20

## 2025-01-22 RX ADMIN — BACLOFEN 5 MILLIGRAM(S): 10 INJECTION INTRATHECAL at 05:20

## 2025-01-22 RX ADMIN — Medication 150 MILLIGRAM(S): at 03:45

## 2025-01-22 RX ADMIN — Medication 1 APPLICATION(S): at 05:23

## 2025-01-22 RX ADMIN — Medication 1 DROP(S): at 23:28

## 2025-01-22 RX ADMIN — BACLOFEN 5 MILLIGRAM(S): 10 INJECTION INTRATHECAL at 17:24

## 2025-01-22 RX ADMIN — IPRATROPIUM BROMIDE AND ALBUTEROL SULFATE 3 MILLILITER(S): .5; 2.5 SOLUTION RESPIRATORY (INHALATION) at 17:19

## 2025-01-22 RX ADMIN — Medication 1 APPLICATION(S): at 05:22

## 2025-01-22 RX ADMIN — ACETYLCYSTEINE 4 MILLILITER(S): 200 INHALANT RESPIRATORY (INHALATION) at 11:18

## 2025-01-22 RX ADMIN — FLUTICASONE PROPIONATE 1 SPRAY(S): 50 SPRAY, METERED NASAL at 17:41

## 2025-01-22 RX ADMIN — ACETYLCYSTEINE 4 MILLILITER(S): 200 INHALANT RESPIRATORY (INHALATION) at 23:27

## 2025-01-22 RX ADMIN — LEVETIRACETAM 1500 MILLIGRAM(S): 10 INJECTION, SOLUTION INTRAVENOUS at 17:19

## 2025-01-22 RX ADMIN — Medication 1 DROP(S): at 01:15

## 2025-01-22 RX ADMIN — ERYTHROMYCIN 1 APPLICATION(S): 5 OINTMENT OPHTHALMIC at 23:34

## 2025-01-22 RX ADMIN — Medication 1 SPRAY(S): at 05:21

## 2025-01-22 RX ADMIN — Medication 100 MILLIGRAM(S): at 13:08

## 2025-01-22 RX ADMIN — DOXAZOSIN MESYLATE 8 MILLIGRAM(S): 8 TABLET ORAL at 23:27

## 2025-01-22 RX ADMIN — Medication 15 MILLILITER(S): at 17:19

## 2025-01-22 RX ADMIN — Medication 10 MILLIGRAM(S): at 17:24

## 2025-01-22 RX ADMIN — OFLOXACIN 1 DROP(S): 3 SOLUTION OPHTHALMIC at 17:40

## 2025-01-22 RX ADMIN — Medication 40 MILLIGRAM(S): at 11:17

## 2025-01-22 RX ADMIN — OFLOXACIN 1 DROP(S): 3 SOLUTION OPHTHALMIC at 11:18

## 2025-01-22 RX ADMIN — ACETYLCYSTEINE 4 MILLILITER(S): 200 INHALANT RESPIRATORY (INHALATION) at 17:19

## 2025-01-22 RX ADMIN — FLUTICASONE PROPIONATE 1 SPRAY(S): 50 SPRAY, METERED NASAL at 05:21

## 2025-01-22 RX ADMIN — Medication 1 DROP(S): at 17:40

## 2025-01-22 RX ADMIN — Medication 15 MILLILITER(S): at 05:20

## 2025-01-22 RX ADMIN — LEVETIRACETAM 1500 MILLIGRAM(S): 10 INJECTION, SOLUTION INTRAVENOUS at 05:20

## 2025-01-22 RX ADMIN — Medication 650 MILLIGRAM(S): at 19:24

## 2025-01-22 RX ADMIN — IPRATROPIUM BROMIDE AND ALBUTEROL SULFATE 3 MILLILITER(S): .5; 2.5 SOLUTION RESPIRATORY (INHALATION) at 11:17

## 2025-01-22 RX ADMIN — HEPARIN SODIUM 5000 UNIT(S): 1000 INJECTION INTRAVENOUS; SUBCUTANEOUS at 13:17

## 2025-01-22 RX ADMIN — ACETYLCYSTEINE 4 MILLILITER(S): 200 INHALANT RESPIRATORY (INHALATION) at 05:20

## 2025-01-22 RX ADMIN — Medication 1 DROP(S): at 05:22

## 2025-01-22 RX ADMIN — Medication 650 MILLIGRAM(S): at 20:00

## 2025-01-22 RX ADMIN — HEPARIN SODIUM 5000 UNIT(S): 1000 INJECTION INTRAVENOUS; SUBCUTANEOUS at 23:27

## 2025-01-22 RX ADMIN — Medication 1 DROP(S): at 11:07

## 2025-01-22 RX ADMIN — OFLOXACIN 1 DROP(S): 3 SOLUTION OPHTHALMIC at 23:28

## 2025-01-22 RX ADMIN — OFLOXACIN 1 DROP(S): 3 SOLUTION OPHTHALMIC at 05:22

## 2025-01-22 RX ADMIN — AMLODIPINE BESYLATE 5 MILLIGRAM(S): 10 TABLET ORAL at 05:20

## 2025-01-22 RX ADMIN — IPRATROPIUM BROMIDE AND ALBUTEROL SULFATE 3 MILLILITER(S): .5; 2.5 SOLUTION RESPIRATORY (INHALATION) at 05:21

## 2025-01-22 NOTE — PROGRESS NOTE ADULT - ASSESSMENT
44M with unclear PMH (?stroke, MI) who was found down at work, intubated for airway protection and found to have acute parenchymal hemorrhage within nabil with mass effect (+ acute/subacute right cerebellar infarct) in setting of hypertensive emergency, transferred to [facility] for further neurosurgical care. Hospital course c/b poor neurologic recovery s/p trach-PEG, AUR s/p gabriel, ANIKA on CKD c/b hyperkalemia, HAP s/p amp-sulbactam (EOT 10/23), K aerogenes bacteremia  treated with 2 weeks of Cefepime per ID , On 11/15 he became septic and was transferred to NSICU due to increased o2 requirements and needed Vent support , Trach Cultures + for Stenotrophomonas which was treated with 7 days of Bactrim per ID , has been weaned of Vent since 11/23 and is now on 10lts at 40% o2 through Trach Collar. Stepped up to the NSICU on 12/15 for hypoxia and tachycardia. PE ruled out. On abx for 5 days. Unclear etiology. Now stepped down to 8Lach.    # Pontine hemorrhage  # Encephalopathy due to Intracranial Bleed   - Acute parenchymal hemorrhage within nabil with mass effect (+ acute/subacute right cerebellar infarct) in setting of hypertensive emergency.   - MRI brain also demonstrated acute/subacute R cerebellar stroke.   - No Neurosurgical Interventions were performed   - Secondary to IPH and cerebellar stroke patient has become Trach and PEG  Dependent    #Fever   -Pt completed Zosyn tx course on  1/18   -Pt is afebrile and no leukocytosis   -Pt's blood cx on 1/12/25 was negative     #Acute kidney injury (RESOLVED)   # Acute Tubular Necrosis superimposed on CKD stage III  - Pt s/p US renal with chronic medical renal disease  -  Continue Free water to 200 q4h. Will continue to monitor creatinine  -Monitor UOP, bladder scan per protocol.     # Seizures  - Continue Seizure precautions   - Continue  Keppra and Fosphenytoin     # Hypertension  -Pt with elevated BP overnight   - Continue amlodipine 5mg daily with holding parameters  - Will continue to monitor BP and uptitrate antihypertensive as required     #Bradycardia  -Pt's propranolol was decreased on 1/19 from 10mg PO q8 to 5mg PO q8 with holding parameters     # Chronic respiratory failure.   - S/p percutaneous trach by pulm on 10/14/24  - Currently on Vent Support   - Continue Chest PT    # Neurogenic dysphagia.   - Pt s/p PEG with surgery 10/21/24  - TF per nutrition  - aspiration precautions and elevation of HOB.    #Acute urinary retention.   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

## 2025-01-22 NOTE — PROGRESS NOTE ADULT - SUBJECTIVE AND OBJECTIVE BOX
O/N Events: GEORGE  Subjective/ROS: No complaints. Denies HA, CP, SOB, n/v, changes in bowel/urinary habits.  12pt ROS otherwise negative.    VITALS  Vital Signs Last 24 Hrs  T(C): 36.2 (22 Jan 2025 09:10), Max: 37.2 (21 Jan 2025 13:50)  T(F): 97.2 (22 Jan 2025 09:10), Max: 98.9 (21 Jan 2025 13:50)  HR: 68 (22 Jan 2025 08:35) (68 - 98)  BP: 140/99 (22 Jan 2025 08:35) (114/80 - 140/99)  BP(mean): 115 (22 Jan 2025 08:35) (93 - 115)  RR: 18 (22 Jan 2025 08:35) (13 - 18)  SpO2: 100% (22 Jan 2025 08:35) (96% - 100%)    Parameters below as of 22 Jan 2025 08:35  Patient On (Oxygen Delivery Method): ventilator    O2 Concentration (%): 40    I&O's Summary    21 Jan 2025 07:01  -  22 Jan 2025 07:00  --------------------------------------------------------  IN: 2140 mL / OUT: 1935 mL / NET: 205 mL      CAPILLARY BLOOD GLUCOSE      PHYSICAL EXAM  General: minimally responsive on ventilator, NAD, no labored breathing   HEENT: trach collar in place, clean  Lungs: anterior exam, limited, no crackles, no wheezes  Heart: regular  Abdomen: soft, no distension, + BS  Extremities:  warm, trace edema, not following commands     MEDICATIONS  (STANDING):  acetylcysteine 10%  Inhalation 4 milliLiter(s) Inhalation every 6 hours  albuterol/ipratropium for Nebulization 3 milliLiter(s) Nebulizer every 6 hours  amLODIPine   Tablet 5 milliGRAM(s) Oral daily  artificial tears (preservative free) Ophthalmic Solution 1 Drop(s) Right EYE every 4 hours  baclofen 5 milliGRAM(s) Oral every 12 hours  chlorhexidine 0.12% Liquid 15 milliLiter(s) Oral Mucosa every 12 hours  chlorhexidine 2% Cloths 1 Application(s) Topical <User Schedule>  doxazosin 8 milliGRAM(s) Oral at bedtime  erythromycin   Ointment 1 Application(s) Right EYE at bedtime  fluticasone propionate 50 MICROgram(s)/spray Nasal Spray 1 Spray(s) Both Nostrils two times a day  heparin   Injectable 5000 Unit(s) SubCutaneous every 8 hours  influenza   Vaccine 0.5 milliLiter(s) IntraMuscular once  levETIRAcetam 1500 milliGRAM(s) Oral two times a day  ofloxacin 0.3% Solution 1 Drop(s) Right EYE four times a day  pantoprazole  Injectable 40 milliGRAM(s) IV Push daily  petrolatum Ophthalmic Ointment 1 Application(s) Both EYES two times a day  phenytoin   Suspension 150 milliGRAM(s) Oral <User Schedule>  phenytoin   Suspension 100 milliGRAM(s) Oral <User Schedule>  potassium chloride   Powder 40 milliEquivalent(s) Enteral Tube once  propranolol 5 milliGRAM(s) Oral every 12 hours  sodium chloride 0.65% Nasal 1 Spray(s) Both Nostrils two times a day    MEDICATIONS  (PRN):  acetaminophen   Oral Liquid .. 650 milliGRAM(s) Oral every 6 hours PRN Temp greater or equal to 38C (100.4F), Mild Pain (1 - 3)      Allergy Status Unknown      LABS                        11.1   6.97  )-----------( 184      ( 22 Jan 2025 05:30 )             35.7     01-22    135  |  97  |  38[H]  ----------------------------<  105[H]  3.4[L]   |  25  |  1.00    Ca    9.3      22 Jan 2025 05:30  Phos  3.6     01-22  Mg     2.1     01-22        Urinalysis Basic - ( 22 Jan 2025 05:30 )    Color: x / Appearance: x / SG: x / pH: x  Gluc: 105 mg/dL / Ketone: x  / Bili: x / Urobili: x   Blood: x / Protein: x / Nitrite: x   Leuk Esterase: x / RBC: x / WBC x   Sq Epi: x / Non Sq Epi: x / Bacteria: x      IMAGING/EKG/ETC: reviewed

## 2025-01-22 NOTE — PROGRESS NOTE ADULT - SUBJECTIVE AND OBJECTIVE BOX
HPI:  Unknown age male, unknown past medical history (reported stroke and MI by coworkers) presented to [facility] with AMS, Pt was working at [employer] when he was found down by coworkers. EMS called and pt brought to [facility] ED. Intubated, sedated, started on cardene for SBPs in 200s. CT head showed brain stem bleed. Transferred to NSICU for further management.  (30 Sep 2024 12:55)    INTERVAL EVENTS: GEORGE ovn    Hospital course:   9/30: transferred from [facility]. A line placed. Versed dc'd. Cy Rader at bedside, states pt has family in [place], cannot confirm medications or PMH other than stroke and MI. 250cc bolus 3% given. LR switched to NS. hydralazine 25q8 started, 3% started, switched propofol to precedex   10/1: stability CTH done. Added labetalol, started TF. Palliative consulted. ethics consulted to determine surrogate. febrile 103, pan cx sent  10/2: BD 2, GEORGE overnight. TF resumed. Desatt'd to 80s, FiO2 inc. to 50. Fentanyl given, ABG, CXR ordered. Maxxed on precedex, started on propofol for DAREIN -4 - -5. Precedex dc'd. Duonebs, mucomyst, hypertonic added. 3% dc'd. Cardene dc'd. Start vanc/CTX. Increased labetalol 200q8. MRSA negative, dc'd vanc. ETT pulled back 2cm x 2, good positioning after confirmatory chest xray. Ethics attempting to establish HCP with family. Na 159, starting FW 250q6 for range 150-155.   10/3: BD3, GEORGE o/n, neuro stable. Na elevating, FW increased to 300q6. Dc'd bowel reg for diarrhea. vEEG started. SQH 5000q8 tonight.   10/4: BD 4, albumn bolus, incr. LR to 80 2/2 incr. in Cr, LR to 10 0cc/hr for uptrending Cr. Started 7% hypersal for 48hrs and SL atropine for inline/oral thick secretions. Dc'd CTX and started ancef for MSSA in the sputum. Nephrology consulted for CKD, f/u recs. SBP 170s, given hydralazine 10mg IVP.   10/5: BD5, o/n 10mg IVP hydralazine given for SBP 170s and started on hydralazine 25q8 via OGT. 10mg IV push labetalol for SBP > 160s. RT placed for diarrhea.   10/6: BD6, o/n FW increased to 350q4 per nephrology recs. IV tylenol for temp 100.6, SBp 160s presumed uncomfortable.   10/7: BD7, overnight pancultured for temp 101.8F.   10/8: BD8. GEORGE. Cr bumped. decreased LR to 75cc/hr. Adding simethicone ATC. incr hydralazine 50mgTID. Incr labetalol 300mgTID. Na 145, decreased FWF to 250q6. Start precedex. FENa consistent with intrinsic kidney injury. Pend repeat renal US. Retaining up to 1.3L, bladder scans q6, straight cath PRN  10/9: BD 9. GEORGE overnight. Neuro stable. abd xray for distention w non-specific gas pattern, OGT to LIWS for morning. duonebs/mucomyst to q8 for improving secretions. Changed tube feeds to Jevity 1.5 20cc/hr, low rate due to abdominal distention, nepro dense and more difficult to digest. Tolerating CPAP, confirmed by ABG.   10/10: BD 10. GEORGE overnight. Neuro stable. (+) gabriel for urinary retention on bladder scan. inc TF to goal rate of 40cc/hr. family leaning toward pursuing trach/PEG. 1/2 amp for FS 81.   10/11: BD 11. GEORGE overnight. Neuro stable. Trach/PEG consults placed.   10/12: BD 12. GEORGE overnight. Neuro stable. MRI brain complete.   10/13: BD 13. Increase flomax. Hold SQH after PM dose for trach tm. IVL.   10/14: BD 14. GEORGE overnight, remains on AC/VC. Gabriel placed for urinary retention. Dc'd free water.  S/p trach with pulm. NGT placed and CXR confirmed in good position.   10/15: BD 15, GEORGE ovn. resumed feeds. spiked 101, pan cx sent.   10/16: BD 16. GEORGE ovn. Lokelma 5mg for K+ 5.4. Started vanc q 24/zosyn for empiric PNA coverage, IVF to 100/hr. PEG held for fever.   10/17: BD 17,  ordered serum osm and urine osm for am. Started sinemet for neurostimulation. Increased cardura to 0.8. Started FW 100q4, dc'd IVF. MRSA negative, dc'd vanc. NGT replaced d/t coiling.   10/18: BD 18, GEORGE overnight, neuro stable. Amantadine added for neurostim. zosyn changed to unasyn for acinetobacter baumannii, failed TOV and required SC  10/19: BD 19, GEORGE ovn. cardura 2mg added for retention. labetalol decreased 200q8, hydralazine decreased 25q8. Gabriel replaced.   10/20: BD20, GEORGE overnight. NGT dislodged, replaced. PEG tomorrow w/ gen surg, FW increased to 150q4 and labetalol decreased to 100q8, lokelma given for hyperkalemia.   10/21: BD 21. POD0 PEG placement with Gen surg. decr labetolol to 50q8, incr. cardura to 0.4, started lokelma and phoslo, dc gabriel POD0 PEG placement with Gen surg.  10/22: BD 22. Plan to start TF today via PEG. dc labetalol, Following ophtho recs. Increased apnea settings - found to be in cheyne-moe respiration. CPAP 5/5.  10/23: BD 23. hydralazine d/c'd, trach collar trial today. Rectal tube placed at 6am.  10/24: BD24, o/n lokelma held due to diarrhea. Free water 100q6 resumed. dc'd tamsulosin, amantadine. Incr'd cardura to 8mg qhs. Dc'd FW. Switched jevity to nepro. gabriel placed for high urine output. Started SL atropine for oral secretions. Dc'd free water.  10/25: BD25, o/n decreased suctioning requirements to > q4hrs, GEORGE. Cr improving, cont phoslo, lokelma held at this time. Gabriel placed yest, cont. Tolerating trach collar. Given 500cc plasmalyte bolus for ANIKA. Dc'd sinemet.   10/26: BD26, o/n resumed lokelma 5mg daily and resumed 100cc free water q6hrs. Change in neuro status with new right pupillary dilation with anisocoria (right pupil 6mm fixed and left pupil 3mm briskly reactive). Given 23.4% NaCl bullet, taken for emergent CTH showing mostly resolved pontine hemorrhage, continued brainstem hypodensity likely edema d/t hemorrhage, no new hemorrhage or infarct, no herniation, mild increase in size of left lateral ventricle. Vitals remaine stable. Na goal > 140.   10/27: BD27, o/n GEORGE.Neuro stable. Pend stepdown with airway bed.   10/28: BD 28. GEORGE overnight. Neuro stable. Miralax ordered. Gabriel removed, pending TOV.  10/29: BD 29. GEORGE o/n. Given 2L NS over 8 hrs for increased BUN/Cr ratio. Gabriel placed for frequent straight cath.   10/30: BD 30.   10/31: BD 31. GEORGE overnight. Na 149, increased free water to 200q6. 1L NS for uptrending BUN.   11/1: BD 32. GEORGE overnight. Given 1L NS for dehydration. Na 146, increased FW to 250q6.   11/2: BD 33 GEORGE overnight, neuro stable, given 500cc bolus for net negative status and tachycardia   11/3: BD 34, GEORGE overnight, neuro stable. Patient remains tachycardic, EKG showing sinus tachycardia, given additional 500cc NS bolus. Febrile to 101.9F, pan cultured (without UA), CXR WNL, given tylenol.   11/4: BD 35, GEORGE overnight, neuro stable. Given 1L NS for tachycardia. sputum (+) for stenotropohomas maltophilia.   11/5: BD 36 GEORGE overnight, neuro stable. Vancomycin dc'd. Chest PT BID. ID consulted, cont zosyn.  11/6: BD 37. blood cx + klebsiella dc zosyn changed to cefepime, CTAP ordered, rpt blood cx sent.    11/7: BD 38. Pending CT A/P, given 250cc bolus and starting maintenance fluids overnight. Pending CT A/P after bolus   11/8: BD 39. CT CAP negative for infection.   11/9: BD 40. GEORGE overnight.  11/10: BD 41. GEORGE overnight. desat to 85 on trach collar, O2 inc to 10L and 100%, O2 sat inc to 95. pt tachy to 110s, euvolemic. given tylenol. ABG and CXR ordered. spiked fever, pancultured, RVP negative. AM ABG w pO2 79, rpt w pO2 79. pt appears comfortable, satting 94%.   11/11: BD 42. GEORGE overnight. pt became tachy to 130s, desat to 90 on 100% FiO2 and 10L. suctioned, (+) productive cough. temp 101.4, given 1g IV tylenol and 500cc NS bolus for euvolemia. fever and HR downtrending. LE dopplers negative for dvt  11/12: BD 43, GEORGE ovn, fever and HR downtrending, satting 97% 70% FIO2  11/13: BD 44, GEORGE ovn. started standing tylenol x24 hours for tachycardia. desat to 80s, o2 increased. CXR stable, pending CTA PE protocol.   11/14: BD 45, GEORGE overnight, neuro stable. resp therapy dec FiO2 to 70%.   11/15: BD 46, GEORGE overnight, neuro stable.  Rapid called for desaturation 30s, tachycardic 140s. Patient bagged, 100% fio2, heavily suctioned. CXR/POCUS unremarkable. ABG c/w desaturation. WBC 14.71. Afebrile. O2 improved to 90s and patient upgraded to ICU. ABG paO2 30s improved to 89 on vent. IV Tylenol x 1, sputum sent. Start protonix while o-n vent.   11/16: BD 47. POCUS showed collapsable IVCF, given 1L bolus. Vanco/Cefpime added empriic for PNA, NGT feeds restarted. MRSA swab neg, Vanc DC'd.   11/17: BD 48. GEORGE overnight. 1l bolus for tachycardia. Spiked to 101, cultured. 500cc bolus for tachycardia, tachy to 148 given 25mcg fentanyl, 250cc albumin, 1.5L bolus. 5 IV lopressor with response HR to 100s. +Stenotrophomonas on sputum cx.   11/18: BD 49. GEORGE overnight. Consulted ID, cefepime switched to bactrim x7days. Started hydrochlorothiazine 12.5mg daily.  11/19: BD 50. Tachy 120s, given tylenol and 500cc NS. Tolerating 5/5, switched to TCx. Holding phos binder. D/c Bactrim. D/c gabriel, f/u TOV. Dc'd PPI.   11/20: BD 51. GEORGE ovn. 1600 satting low 90s, mildly tachy to 110s, afebrile, RR wnl. O2 improved to mid 90s while inc O2 to 100% on TCx. CPAP 5/5 placed back on.  11/21: BD 52, GEORGE ovn, tolerating CPAP 5/5. Switch to trach collar during the day if tolerating well. HCTZ held for Cr bump, straight cath frequence increased to q4  11/22: BD 53, GEORGE ovn. Resumed phoslo. Gabriel placed. Resumed HCTZ.   11/23: BD 54. Holding tylenol in setting of possible fever, will require pan cx if febrile. Cr improved today. Cont CPAP. Bowel regimen held i/s/o diarrhea. FOBT negative.  11/24: BD 55. GEORGE overnight. Neuro stable. HCTZ dc'ed, started lisinopril 5. Lokelma dc'ed for K 3.7.   11/25: BD 56, GEORGE overnight. Neuro stable. dc'd lisinopril 5mg. Gabriel dc'd. TOV. 1545 noted to be hypotensive, MAP 50, in supine position on chair, HR 60s, afebrile, O2 96%. Given 1L cc NS bolus, placed back on bed in reverse trendelenberg, improved to map of 66. Neostick at bedside. Vitals check q1h. Dc'ed amlodipine. Failed TOV, bladder scan q6, sc prn. Added back Senna.   11/26: BD 57, GEORGE overnight. Neuro stable. Dc'd phoslo.   11/27: BD 58, GEORGE overnight. Neuro stable.    11/28: BD 59. Gabriel replaced.   11/29: GEORGE.  11/30: GEORGE, neuro stable.   12/1: BD 62, GEORGE overnight  12/2: BD 63, GEORGE overnight.; Given 1L bolus of LR for uptrending BUN/Cr.  12/3: BD 64. Reinstated eye gtt/moisture chamber given increased Rt eye injection  12/4: BD 65. GEORGE overnight. Attempted to speak with ophtho regarding eyelid weight/closure but no answer, full mailbox.   12/5: BD 66, GEORGE overnight, bowel regimen increased and had BM.   12/6: BD 67, GEORGE overnight, neuro stable.  12/7: GEORGE overnight, neuro stable. /110s, given x1 hydralazine 10 mg IVP. Restarted home amlodipine 5mg.  12/8: GEORGE. OOB to chair.     12/11: GEORGE, mucomyst added for thick secretions, simethicone for abd distension, abd xray with stool burden, increased bowel regimen.   12/12: GEORGE overnight.   12/13: GEORGE overnight.   12/14: GEORGE overnight  12/15: o/n Patient became tachycardic HR 120s and 10 minutes later O2 sat dropped as low as 89%. Patient suctioned without improvement in O2 sat and tube feeds found in suction catheter. TFs held.  STAT CXR ordered. STAT labs sent. Respiratory therapist called to bedside and patient trach connected to ventilator. After connection to ventilator and further suctioning O2 sat improved to 97% but patient HR remains 120-130s. Upgraded to NSICU for further management. Vancomycin and zosyn started. CTA  chest PE protocol and CTH ordered. Blood cultures sent.given 500cc bolus, rpt ABG sent pO2 243, CTH and CTA chest done. FS while NPO, FiO2 dec 50 pending ABG. sputum cx positive for few GPC and GNR.   12/16: GEORGE overnight, restarted amlodipine, troponin 75   12/17: GEORGE overnight, neuro stable. Attempted CPAP this morning, did not tolerate and back on full vent support. Dc'd Vanc. Tachycardia to 140s, noted extremities to be twitching along with jaw twitching. Given 2g of Keppra, total 4mg ativan and placed on EEG, full set of labs and lactate negative. Resumed trickle feeds at 20cc/hr. Given 250cc albumin for tachycardia.   12/18: GEORGE overnight. Neuro stable. CTH stable. EEG negative and dc'd.   12/19: GEORGE overnight. neuro stable. SIMV most of day, AC/VC at night.   12/20: GEORGE overnight, neuro stable. remains on VC/AC  12/21: GEORGE ovn. 1L bolus for tachy to 120s-130s.   12/22: GEORGE ovn. Tachy to 120s, given tylenol and IVF. 2mg IV ativan given for L jaw twitching. Sx resolved for 3 minutes and started again. Epilepsy contacted. Given 2mg IV ativan. Continued twitching. Increased keppra to 1500mg BID, 2mg ativan given. Propranolol started for refractory tachycardia. vEEG ordered. albumin given. POCUS performed, no b lines. Started on fosphenytoin, loaded w 20mg/kg then 100mg TID. febrile, pancx, started cefepime 2g q8, vancomycin  12/23: GEORGE ovn. +focal motor seizures on eeg. ID consulted. Vancomycin dc'ed as per ID.  12/24: GEORGE ovn, neuro stable. Baclofen 5 mg q12 started for hiccups. Na 129 from 134. Urine lytes c/w SIADH. Given 250cc 3% bolus. CT chest for infection w/u - f/u read. Repeat Na 136. UA negative  12/25: GEORGE ovn.   12/26: GEORGE ovn  12/27: GEORGE overnight. Blood cx neg @ 4 days, DC cefepime per medicine (sputum colonized).   12/28: Desaturation o/n to 80's, CXR obtained, pulse ox changed and sats resolved. Na 133 from 138, 250cc 3% bolus given. Cefepime resumed until 12/30 per ID. Rpt Na 138.  12/29: GEORGE overnight. Na stable.   12/30: GEORGE overnight. Family meeting with son, pt now DNR.   12/31: GEOGRE overnight.   1/1: GEORGE overnight, neuro stable.  1/2: GEORGE overnight, neuro stable. FW decreased to 100q6.   1/3: GEORGE overnight, neuro stable. fosphenytoin IV changed to pheytoin PO via PEG per epilepsy recommendations  1/4: GEORGE overnight, neuro stable. FW incr. to 100q4  1/5: GEORGE overnight, neuro stable.   1/6: GEORGE overnight, neuro stable   1/7: GEORGE overnight, neuro stable. BUN/Cr increased, increased FW to 200q6. Given 1L bolus of LR over 2 hours. Gabriel d/c'd, voiding, continue bladder scan q6.   1/8: GEORGE overnight, neuro stable. BUN/Cr improving.  1/9: GEORGE overnight, neuro stable.   1/10: GEORGE overnight, neuro stable.   1/11: GEORGE overnight, neuro stable, vent settings stable.   1/12: GEORGE overnight, neuro stable. Febrile to 102F, f/u pan cx, chest xray, given tylenol. Zosyn started.   1/13: GEORGE overnight, neuro stable, cont Zosyn for presumed PNA, prelim sputum cx growing; few GS neg diplococci, mod GS neg rods, rare GS + rods, fever trend improved. Free water increased to [id number].   1/14: GEORGE overnight. pending renal US for elevated Cr  1/15: GEORGE ovn. renal US complete.   1/16: GEORGE overnight, neuro stable   1/17: GEORGE overnight, neuro stable   1/18: GEORGE overnight, neuro stable.   1/19: GEORGE overnight, neuro stable. Propranolol dec to 5q8.   1/20: GEORGE overnight, neuro stable. Weaned propranolol to 5mg BID.   1/21: GEORGE ovn, in AM having rapid vertical eye movements with facial twitching, 2g total keppra given for AM dose and phenytoin level ordered, vital signs stable. CTH stable. Phenytoin increased to 100/100/150mg per epilepsy  1/22: GEORGE ovn    Vital Signs Last 24 Hrs  T(C): 37.1 (21 Jan 2025 22:05), Max: 37.3 (21 Jan 2025 05:05)  T(F): 98.8 (21 Jan 2025 22:05), Max: 99.2 (21 Jan 2025 05:05)  HR: 76 (21 Jan 2025 20:55) (68 - 98)  BP: 124/86 (21 Jan 2025 20:14) (122/86 - 151/106)  BP(mean): 100 (21 Jan 2025 20:14) (99 - 125)  RR: 16 (21 Jan 2025 20:55) (14 - 17)  SpO2: 98% (21 Jan 2025 20:55) (96% - 99%)    Parameters below as of 21 Jan 2025 20:55  Patient On (Oxygen Delivery Method): ventilator    O2 Concentration (%): 40    I&O's Summary    20 Jan 2025 07:01  -  21 Jan 2025 07:00  --------------------------------------------------------  IN: 2240 mL / OUT: 1650 mL / NET: 590 mL    21 Jan 2025 07:01  -  22 Jan 2025 00:08  --------------------------------------------------------  IN: 1440 mL / OUT: 1360 mL / NET: 80 mL        PHYSICAL EXAM:  Constitutional: Pt found in bed in NAD  ENT: PERRL, vertical EOMi  Respiratory: +trach, breathing non-labored, symmetrical chest wall movement  Cardiovascuar: RRR, no murmurs  Gastrointestinal: +PEG, abdomen soft, non tender  Neurological:  AAOX3 with squeezing R hand communication. Verbal function not intact, intermittently follows commands   Motor: RUE handgrip to command 2/5, RLE w/d, LUE 0/5, LLE withdraws to noxious   Pronator Drift: unable to assess     TUBES/LINES:  [] Gabriel  [] A-line  [] Lumbar Drain  [] Wound Drains  [] NGT   [] EVD   [] CVC  [] Other      DIET:  [] NPO  [x] Mechanical  [] Tube feeds    LABS:                        10.5   7.05  )-----------( 194      ( 21 Jan 2025 05:30 )             31.3     01-21    138  |  100  |  38[H]  ----------------------------<  103[H]  3.7   |  25  |  0.95    Ca    8.9      21 Jan 2025 05:30  Phos  3.2     01-21  Mg     2.0     01-21        Urinalysis Basic - ( 21 Jan 2025 05:30 )    Color: x / Appearance: x / SG: x / pH: x  Gluc: 103 mg/dL / Ketone: x  / Bili: x / Urobili: x   Blood: x / Protein: x / Nitrite: x   Leuk Esterase: x / RBC: x / WBC x   Sq Epi: x / Non Sq Epi: x / Bacteria: x          CAPILLARY BLOOD GLUCOSE          Drug Levels: [] N/A  Phenytoin Level, Serum: 3.3 ug/mL (01-21 @ 07:59)    CSF Analysis: [] N/A      Allergies    Allergy Status Unknown    Intolerances        Home Medications:      MEDICATIONS:  MEDICATIONS  (STANDING):  acetylcysteine 10%  Inhalation 4 milliLiter(s) Inhalation every 6 hours  albuterol/ipratropium for Nebulization 3 milliLiter(s) Nebulizer every 6 hours  amLODIPine   Tablet 5 milliGRAM(s) Oral daily  artificial tears (preservative free) Ophthalmic Solution 1 Drop(s) Right EYE every 4 hours  baclofen 5 milliGRAM(s) Oral every 12 hours  chlorhexidine 0.12% Liquid 15 milliLiter(s) Oral Mucosa every 12 hours  chlorhexidine 2% Cloths 1 Application(s) Topical <User Schedule>  doxazosin 8 milliGRAM(s) Oral at bedtime  erythromycin   Ointment 1 Application(s) Right EYE at bedtime  fluticasone propionate 50 MICROgram(s)/spray Nasal Spray 1 Spray(s) Both Nostrils two times a day  heparin   Injectable 5000 Unit(s) SubCutaneous every 8 hours  influenza   Vaccine 0.5 milliLiter(s) IntraMuscular once  levETIRAcetam 1500 milliGRAM(s) Oral two times a day  ofloxacin 0.3% Solution 1 Drop(s) Right EYE four times a day  pantoprazole  Injectable 40 milliGRAM(s) IV Push daily  petrolatum Ophthalmic Ointment 1 Application(s) Both EYES two times a day  phenytoin   Suspension 150 milliGRAM(s) Oral <User Schedule>  phenytoin   Suspension 100 milliGRAM(s) Oral <User Schedule>  propranolol 5 milliGRAM(s) Oral every 12 hours  sodium chloride 0.65% Nasal 1 Spray(s) Both Nostrils two times a day    MEDICATIONS  (PRN):  acetaminophen   Oral Liquid .. 650 milliGRAM(s) Oral every 6 hours PRN Temp greater or equal to 38C (100.4F), Mild Pain (1 - 3)      CULTURES:  Culture Results:   >=3 organisms. Probable collection contamination. (01-12 @ 15:25)  Culture Results:   No growth at 5 days (01-12 @ 12:50)      RADIOLOGY & ADDITIONAL TESTS:      ASSESSMENT:  44M PMH ?stroke/MI present to [facility] after collapsing at work. Decorticate posturing, vomiting, intubated for airway protection. 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 Transferred to [facility] for further management. s/p trach 10/14. s/p peg 10/21. Re-upgrade to ICU 2/2 desaturation event and suctioning requirements 11/15. Re-upgrade to NSICU 12/15 2/2 desaturation and tachycardia.    Neuro:  - neuro/vitals q4h  - pain control: tylenol prn  - seizure tx: keppra 1500mg BID, phenytoin 100/100/150  - vEEG (10/3-4) negative, (10/17-10/19) negative, (12/17-12/18) negative, (12/22-12/25) +focal motor seizures not correlating w/ EEG  - CTH 9/30: enlarged pontine hemorrhage, CTH 10/3: stable, CTH 10/25: mostly resolved pontine hemorrhage, CTH 12/15: R mastoid air cell opacification; acute otitis media vs sterile effusion, CTH 12/18: stable. CTH 1/21 stable.  - MRI brain 10/12: parenchymal hemorrhage, acute/subacute R cerebellar stroke      CV:  - -160  - tachycardia: propranolol 5mg BID   - HTN: amlodipine 5mg  - echo (9/30) EF 75%, repeat 12/16: 57%    Resp:  - trach to vent, AC/VC 40/400/14/6  - Secretions: duonebs/mucomyst/chest PT q6h     GI:  - TF via PEG (placed 10/21 by gen surg)  - bowel regimen held for loose stool, last BM 1/1  - baclofen 5q12 for hiccups    Renal:  - IVL, FW 200q4 for hydration   - Urinary retenion: Gabriel removed 1/7, Voiding  - CKD: trend BUN/Cr  - renal US 10/1, 10/8, 1/15: Increased bilateral renal echogenicity consistent with medical renal disease    Endo:  - A1c 5.4    Heme:  - DVT ppx: SCDs, SQH 5000u q8h   - LE dopplers negative 12/16    ID:  - last pan cx 1/12   - empiric PNA: cefepime (12/22-12/30), s/p vanc (12/22-12/23), s/p zosyn (12/15-12/20), s/p vanc (12/15-12/16), s/p zosyn (1/12 -1/18)  - s/p Stenotrophomonas maltophilia PNA: s/p Bactrim (11/18-11/19) s/p Cefepime (11/16-11/18)  - 11/3, (+) sputum for stenotrophomas maltophlia, blood cx (+) klebsiella, cefepime 2gq12 (11/6 - 11/12)   - empiric tx: s/p zosyn (11/3-11/6) , s/p vanc  (11/3-11/5)  - S/p Ancef (10/4-10/14) for PNA, and s/p Unasyn (10/18-10/23) +actinobacter baumanii     MISC:  - ophtho consult for keratitis  - erythromycin ointment R eye q4hrs, ofloxacin ointment R eye QID, artificial tears R eye q2hrs, moisture chamber at bedtime    Dispo: SDU status, DNR, pending PRUCOL for benefits     D/w Dr. D'Amico     Assessment: present when checked     [] GCS   E   V   M     Heart Failure: [] Acute, [] acute on chronic, [] chronic   Heart Failure: [] Diastolic (HFpEF), [] Systolic (HRrEF), [] Combined (HFpEF and HFrEF), [] RHF, [] Pulm HTN, [] Other     [] ANIKA, [] ATN, [] AIN, [] other   [] CKD1, [] CKD2, [] CKD3, [] CKD4, [] CKD5, [] ESRD     Encephalopathy: [] Metabolic, [] Hepatic, [] Toxic, [] Neurological, [] Other     Abnormal Nutritional Status: [] malnutrition (see nutrition note), []underweight: BMI <19, [] morbid obesity: BMI >40, [] Cachexia     [] Sepsis   [] Hypovolemic shock, [] Cardiogenic shock, [] Hemorrhagic shock, [] Neurogenic shock   [] Acute respiratory failure   [] Cerebral edema, [] Brain compression / herniation   [] Functional quadriplegia   [] Acute blood loss anemia

## 2025-01-23 LAB
ADD ON TEST-SPECIMEN IN LAB: SIGNIFICANT CHANGE UP
ANION GAP SERPL CALC-SCNC: 10 MMOL/L — SIGNIFICANT CHANGE UP (ref 5–17)
BUN SERPL-MCNC: 37 MG/DL — HIGH (ref 7–23)
CALCIUM SERPL-MCNC: 9.2 MG/DL — SIGNIFICANT CHANGE UP (ref 8.4–10.5)
CHLORIDE SERPL-SCNC: 101 MMOL/L — SIGNIFICANT CHANGE UP (ref 96–108)
CO2 SERPL-SCNC: 27 MMOL/L — SIGNIFICANT CHANGE UP (ref 22–31)
CREAT SERPL-MCNC: 0.96 MG/DL — SIGNIFICANT CHANGE UP (ref 0.5–1.3)
EGFR: 99 ML/MIN/1.73M2 — SIGNIFICANT CHANGE UP
EGFR: 99 ML/MIN/1.73M2 — SIGNIFICANT CHANGE UP
GLUCOSE SERPL-MCNC: 121 MG/DL — HIGH (ref 70–99)
HCT VFR BLD CALC: 33.8 % — LOW (ref 39–50)
HGB BLD-MCNC: 10.7 G/DL — LOW (ref 13–17)
MAGNESIUM SERPL-MCNC: 2 MG/DL — SIGNIFICANT CHANGE UP (ref 1.6–2.6)
MCHC RBC-ENTMCNC: 30.1 PG — SIGNIFICANT CHANGE UP (ref 27–34)
MCHC RBC-ENTMCNC: 31.7 G/DL — LOW (ref 32–36)
MCV RBC AUTO: 94.9 FL — SIGNIFICANT CHANGE UP (ref 80–100)
NRBC # BLD: 0 /100 WBCS — SIGNIFICANT CHANGE UP (ref 0–0)
NRBC BLD-RTO: 0 /100 WBCS — SIGNIFICANT CHANGE UP (ref 0–0)
PHENYTOIN FREE SERPL-MCNC: 2.9 UG/ML — LOW (ref 10–20)
PHOSPHATE SERPL-MCNC: 3 MG/DL — SIGNIFICANT CHANGE UP (ref 2.5–4.5)
PLATELET # BLD AUTO: 173 K/UL — SIGNIFICANT CHANGE UP (ref 150–400)
POTASSIUM SERPL-MCNC: 3.6 MMOL/L — SIGNIFICANT CHANGE UP (ref 3.5–5.3)
POTASSIUM SERPL-SCNC: 3.6 MMOL/L — SIGNIFICANT CHANGE UP (ref 3.5–5.3)
RBC # BLD: 3.56 M/UL — LOW (ref 4.2–5.8)
RBC # FLD: 13.9 % — SIGNIFICANT CHANGE UP (ref 10.3–14.5)
SODIUM SERPL-SCNC: 138 MMOL/L — SIGNIFICANT CHANGE UP (ref 135–145)
WBC # BLD: 7.86 K/UL — SIGNIFICANT CHANGE UP (ref 3.8–10.5)
WBC # FLD AUTO: 7.86 K/UL — SIGNIFICANT CHANGE UP (ref 3.8–10.5)

## 2025-01-23 PROCEDURE — 99232 SBSQ HOSP IP/OBS MODERATE 35: CPT

## 2025-01-23 PROCEDURE — 99233 SBSQ HOSP IP/OBS HIGH 50: CPT

## 2025-01-23 RX ORDER — PHENYTOIN 100 MG/4ML
100 SUSPENSION, ORAL (FINAL DOSE FORM) ORAL
Refills: 0 | Status: DISCONTINUED | OUTPATIENT
Start: 2025-01-23 | End: 2025-01-25

## 2025-01-23 RX ORDER — PHENYTOIN 100 MG/4ML
200 SUSPENSION, ORAL (FINAL DOSE FORM) ORAL
Refills: 0 | Status: DISCONTINUED | OUTPATIENT
Start: 2025-01-23 | End: 2025-03-13

## 2025-01-23 RX ADMIN — FLUTICASONE PROPIONATE 1 SPRAY(S): 50 SPRAY, METERED NASAL at 05:04

## 2025-01-23 RX ADMIN — OFLOXACIN 1 DROP(S): 3 SOLUTION OPHTHALMIC at 13:05

## 2025-01-23 RX ADMIN — Medication 1 DROP(S): at 06:47

## 2025-01-23 RX ADMIN — Medication 1 DROP(S): at 03:00

## 2025-01-23 RX ADMIN — Medication 40 MILLIGRAM(S): at 13:01

## 2025-01-23 RX ADMIN — Medication 15 MILLILITER(S): at 05:02

## 2025-01-23 RX ADMIN — Medication 1 SPRAY(S): at 05:03

## 2025-01-23 RX ADMIN — AMLODIPINE BESYLATE 5 MILLIGRAM(S): 10 TABLET ORAL at 05:02

## 2025-01-23 RX ADMIN — Medication 1 SPRAY(S): at 18:08

## 2025-01-23 RX ADMIN — Medication 650 MILLIGRAM(S): at 08:59

## 2025-01-23 RX ADMIN — BACLOFEN 5 MILLIGRAM(S): 10 INJECTION INTRATHECAL at 18:11

## 2025-01-23 RX ADMIN — BACLOFEN 5 MILLIGRAM(S): 10 INJECTION INTRATHECAL at 05:02

## 2025-01-23 RX ADMIN — Medication 150 MILLIGRAM(S): at 06:50

## 2025-01-23 RX ADMIN — Medication 40 MILLIEQUIVALENT(S): at 08:30

## 2025-01-23 RX ADMIN — ERYTHROMYCIN 1 APPLICATION(S): 5 OINTMENT OPHTHALMIC at 22:44

## 2025-01-23 RX ADMIN — HEPARIN SODIUM 5000 UNIT(S): 1000 INJECTION INTRAVENOUS; SUBCUTANEOUS at 22:43

## 2025-01-23 RX ADMIN — ACETYLCYSTEINE 4 MILLILITER(S): 200 INHALANT RESPIRATORY (INHALATION) at 13:03

## 2025-01-23 RX ADMIN — DOXAZOSIN MESYLATE 8 MILLIGRAM(S): 8 TABLET ORAL at 22:43

## 2025-01-23 RX ADMIN — LEVETIRACETAM 1500 MILLIGRAM(S): 10 INJECTION, SOLUTION INTRAVENOUS at 18:10

## 2025-01-23 RX ADMIN — OFLOXACIN 1 DROP(S): 3 SOLUTION OPHTHALMIC at 18:12

## 2025-01-23 RX ADMIN — Medication 1 APPLICATION(S): at 18:14

## 2025-01-23 RX ADMIN — Medication 1 DROP(S): at 22:44

## 2025-01-23 RX ADMIN — IPRATROPIUM BROMIDE AND ALBUTEROL SULFATE 3 MILLILITER(S): .5; 2.5 SOLUTION RESPIRATORY (INHALATION) at 05:01

## 2025-01-23 RX ADMIN — IPRATROPIUM BROMIDE AND ALBUTEROL SULFATE 3 MILLILITER(S): .5; 2.5 SOLUTION RESPIRATORY (INHALATION) at 18:12

## 2025-01-23 RX ADMIN — FLUTICASONE PROPIONATE 1 SPRAY(S): 50 SPRAY, METERED NASAL at 18:09

## 2025-01-23 RX ADMIN — HEPARIN SODIUM 5000 UNIT(S): 1000 INJECTION INTRAVENOUS; SUBCUTANEOUS at 13:01

## 2025-01-23 RX ADMIN — Medication 1 DROP(S): at 18:11

## 2025-01-23 RX ADMIN — Medication 100 MILLIGRAM(S): at 13:03

## 2025-01-23 RX ADMIN — Medication 1 DROP(S): at 13:02

## 2025-01-23 RX ADMIN — Medication 1 APPLICATION(S): at 05:04

## 2025-01-23 RX ADMIN — Medication 15 MILLILITER(S): at 18:11

## 2025-01-23 RX ADMIN — Medication 650 MILLIGRAM(S): at 08:29

## 2025-01-23 RX ADMIN — Medication 200 MILLIGRAM(S): at 22:43

## 2025-01-23 RX ADMIN — HEPARIN SODIUM 5000 UNIT(S): 1000 INJECTION INTRAVENOUS; SUBCUTANEOUS at 05:02

## 2025-01-23 RX ADMIN — ACETYLCYSTEINE 4 MILLILITER(S): 200 INHALANT RESPIRATORY (INHALATION) at 18:14

## 2025-01-23 RX ADMIN — Medication 1 APPLICATION(S): at 05:03

## 2025-01-23 RX ADMIN — IPRATROPIUM BROMIDE AND ALBUTEROL SULFATE 3 MILLILITER(S): .5; 2.5 SOLUTION RESPIRATORY (INHALATION) at 13:02

## 2025-01-23 RX ADMIN — Medication 1 DROP(S): at 10:49

## 2025-01-23 RX ADMIN — OFLOXACIN 1 DROP(S): 3 SOLUTION OPHTHALMIC at 05:03

## 2025-01-23 RX ADMIN — ACETYLCYSTEINE 4 MILLILITER(S): 200 INHALANT RESPIRATORY (INHALATION) at 05:03

## 2025-01-23 RX ADMIN — LEVETIRACETAM 1500 MILLIGRAM(S): 10 INJECTION, SOLUTION INTRAVENOUS at 05:02

## 2025-01-23 NOTE — PROGRESS NOTE ADULT - ASSESSMENT
44M with unclear PMH (?stroke, MI) who was found down at work, intubated for airway protection and found to have acute parenchymal hemorrhage within nabil with mass effect (+ acute/subacute right cerebellar infarct) in setting of hypertensive emergency, transferred to [facility] for further neurosurgical care. Hospital course c/b poor neurologic recovery s/p trach-PEG, AUR s/p gabriel, ANIKA on CKD c/b hyperkalemia, HAP s/p amp-sulbactam (EOT 10/23), K aerogenes bacteremia  treated with 2 weeks of Cefepime per ID , On 11/15 he became septic and was transferred to NSICU due to increased o2 requirements and needed Vent support , Trach Cultures + for Stenotrophomonas which was treated with 7 days of Bactrim per ID , has been weaned of Vent since 11/23 and is now on 10lts at 40% o2 through Trach Collar. Stepped up to the NSICU on 12/15 for hypoxia and tachycardia. PE ruled out. On abx for 5 days. Unclear etiology. Now stepped down to 8Lach.    # Pontine hemorrhage  # Encephalopathy due to Intracranial Bleed   - Acute parenchymal hemorrhage within nabil with mass effect (+ acute/subacute right cerebellar infarct) in setting of hypertensive emergency.   - MRI brain also demonstrated acute/subacute R cerebellar stroke.   - No Neurosurgical Interventions were performed   - Secondary to IPH and cerebellar stroke patient has become Trach and PEG  Dependent    #Fever   -Pt completed Zosyn tx course on  1/18   -Pt is afebrile and no leukocytosis   -Pt's blood cx on 1/12/25 was negative     #Acute kidney injury (RESOLVED)   # Acute Tubular Necrosis superimposed on CKD stage III  - Pt s/p US renal with chronic medical renal disease  -  Continue Free water to 200 q4h. Will continue to monitor creatinine  -Monitor UOP, bladder scan per protocol.     # Seizures  - Continue Seizure precautions   - Continue  Keppra and Fosphenytoin     # Hypertension  -Pt with elevated BP overnight   - Continue amlodipine 5mg daily with holding parameters  - Will continue to monitor BP and uptitrate antihypertensive as required     #Bradycardia  -Pt's propranolol was decreased on 1/19 from 10mg PO q8 to 5mg PO q8 with holding parameters     # Chronic respiratory failure.   - S/p percutaneous trach by pulm on 10/14/24  - Currently on Vent Support   - Continue Chest PT    # Neurogenic dysphagia.   - Pt s/p PEG with surgery 10/21/24  - TF per nutrition  - aspiration precautions and elevation of HOB.    #Acute urinary retention.   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

## 2025-01-23 NOTE — PROGRESS NOTE ADULT - SUBJECTIVE AND OBJECTIVE BOX
Neurology Progress Note    Interval History:  The patient was seen and examined at the bedside. Seemingly more interactive today. Following some simple commands.      PAST MEDICAL & SURGICAL HISTORY:  Acute myocardial infarction    CVA (cerebral vascular accident)            Medications:  acetaminophen   Oral Liquid .. 650 milliGRAM(s) Oral every 6 hours PRN  acetylcysteine 10%  Inhalation 4 milliLiter(s) Inhalation every 6 hours  albuterol/ipratropium for Nebulization 3 milliLiter(s) Nebulizer every 6 hours  amLODIPine   Tablet 5 milliGRAM(s) Oral daily  artificial tears (preservative free) Ophthalmic Solution 1 Drop(s) Right EYE every 4 hours  baclofen 5 milliGRAM(s) Oral every 12 hours  chlorhexidine 0.12% Liquid 15 milliLiter(s) Oral Mucosa every 12 hours  chlorhexidine 2% Cloths 1 Application(s) Topical <User Schedule>  doxazosin 8 milliGRAM(s) Oral at bedtime  erythromycin   Ointment 1 Application(s) Right EYE at bedtime  fluticasone propionate 50 MICROgram(s)/spray Nasal Spray 1 Spray(s) Both Nostrils two times a day  heparin   Injectable 5000 Unit(s) SubCutaneous every 8 hours  influenza   Vaccine 0.5 milliLiter(s) IntraMuscular once  levETIRAcetam 1500 milliGRAM(s) Oral two times a day  ofloxacin 0.3% Solution 1 Drop(s) Right EYE four times a day  pantoprazole  Injectable 40 milliGRAM(s) IV Push daily  petrolatum Ophthalmic Ointment 1 Application(s) Both EYES two times a day  phenytoin   Suspension 150 milliGRAM(s) Oral <User Schedule>  phenytoin   Suspension 100 milliGRAM(s) Oral <User Schedule>  propranolol 5 milliGRAM(s) Oral every 12 hours  sodium chloride 0.65% Nasal 1 Spray(s) Both Nostrils two times a day      Vital Signs Last 24 Hrs  T(C): 37 (23 Jan 2025 08:58), Max: 37.1 (22 Jan 2025 22:23)  T(F): 98.6 (23 Jan 2025 08:58), Max: 98.8 (22 Jan 2025 22:23)  HR: 90 (23 Jan 2025 13:00) (82 - 94)  BP: 120/92 (23 Jan 2025 13:00) (114/84 - 147/114)  BP(mean): 103 (23 Jan 2025 13:00) (95 - 139)  RR: 18 (23 Jan 2025 13:00) (15 - 18)  SpO2: 95% (23 Jan 2025 13:00) (94% - 100%)    Parameters below as of 23 Jan 2025 13:00  Patient On (Oxygen Delivery Method): ventilator    O2 Concentration (%): 40    Neurological Exam:   Mental status: Awake, occasionally briefly tracking interviewer. On asking his name with choices appeared to look up, but difficult to ascertain given his facial twitching. Intermittently follows commands to close and open eyes.  +Blink to threat on L eye only. left eyelid and lip be twitching.  RUE hand attempts to squeeze. DTRs 3+ throughout.       Labs:  CBC Full  -  ( 23 Jan 2025 05:30 )  WBC Count : 7.86 K/uL  RBC Count : 3.56 M/uL  Hemoglobin : 10.7 g/dL  Hematocrit : 33.8 %  Platelet Count - Automated : 173 K/uL  Mean Cell Volume : 94.9 fl  Mean Cell Hemoglobin : 30.1 pg  Mean Cell Hemoglobin Concentration : 31.7 g/dL  Auto Neutrophil # : x  Auto Lymphocyte # : x  Auto Monocyte # : x  Auto Eosinophil # : x  Auto Basophil # : x  Auto Neutrophil % : x  Auto Lymphocyte % : x  Auto Monocyte % : x  Auto Eosinophil % : x  Auto Basophil % : x    01-23    138  |  101  |  37[H]  ----------------------------<  121[H]  3.6   |  27  |  0.96    Ca    9.2      23 Jan 2025 05:30  Phos  3.0     01-23  Mg     2.0     01-23          Urinalysis Basic - ( 23 Jan 2025 05:30 )    Color: x / Appearance: x / SG: x / pH: x  Gluc: 121 mg/dL / Ketone: x  / Bili: x / Urobili: x   Blood: x / Protein: x / Nitrite: x   Leuk Esterase: x / RBC: x / WBC x   Sq Epi: x / Non Sq Epi: x / Bacteria: x        Assessment:  This is a 44y Male w/ h/o     Plan: Neurology Progress Note    Interval History:  The patient was seen and examined at the bedside. Seemingly more alert on evaluation today.     PAST MEDICAL & SURGICAL HISTORY:  Acute myocardial infarction    CVA (cerebral vascular accident)            Medications:  acetaminophen   Oral Liquid .. 650 milliGRAM(s) Oral every 6 hours PRN  acetylcysteine 10%  Inhalation 4 milliLiter(s) Inhalation every 6 hours  albuterol/ipratropium for Nebulization 3 milliLiter(s) Nebulizer every 6 hours  amLODIPine   Tablet 5 milliGRAM(s) Oral daily  artificial tears (preservative free) Ophthalmic Solution 1 Drop(s) Right EYE every 4 hours  baclofen 5 milliGRAM(s) Oral every 12 hours  chlorhexidine 0.12% Liquid 15 milliLiter(s) Oral Mucosa every 12 hours  chlorhexidine 2% Cloths 1 Application(s) Topical <User Schedule>  doxazosin 8 milliGRAM(s) Oral at bedtime  erythromycin   Ointment 1 Application(s) Right EYE at bedtime  fluticasone propionate 50 MICROgram(s)/spray Nasal Spray 1 Spray(s) Both Nostrils two times a day  heparin   Injectable 5000 Unit(s) SubCutaneous every 8 hours  influenza   Vaccine 0.5 milliLiter(s) IntraMuscular once  levETIRAcetam 1500 milliGRAM(s) Oral two times a day  ofloxacin 0.3% Solution 1 Drop(s) Right EYE four times a day  pantoprazole  Injectable 40 milliGRAM(s) IV Push daily  petrolatum Ophthalmic Ointment 1 Application(s) Both EYES two times a day  phenytoin   Suspension 150 milliGRAM(s) Oral <User Schedule>  phenytoin   Suspension 100 milliGRAM(s) Oral <User Schedule>  propranolol 5 milliGRAM(s) Oral every 12 hours  sodium chloride 0.65% Nasal 1 Spray(s) Both Nostrils two times a day      Vital Signs Last 24 Hrs  T(C): 37 (23 Jan 2025 08:58), Max: 37.1 (22 Jan 2025 22:23)  T(F): 98.6 (23 Jan 2025 08:58), Max: 98.8 (22 Jan 2025 22:23)  HR: 90 (23 Jan 2025 13:00) (82 - 94)  BP: 120/92 (23 Jan 2025 13:00) (114/84 - 147/114)  BP(mean): 103 (23 Jan 2025 13:00) (95 - 139)  RR: 18 (23 Jan 2025 13:00) (15 - 18)  SpO2: 95% (23 Jan 2025 13:00) (94% - 100%)    Parameters below as of 23 Jan 2025 13:00  Patient On (Oxygen Delivery Method): ventilator    O2 Concentration (%): 40    Neurological Exam:   Mental status: Awake, occasionally briefly tracking interviewer. On asking his name with choices appeared to look up, but difficult to ascertain given his facial twitching. Intermittently follows commands to close and open eyes.  +Blink to threat on L eye only. left eyelid and lip be twitching.  RUE hand attempts to squeeze. DTRs 3+ throughout.       Labs:  CBC Full  -  ( 23 Jan 2025 05:30 )  WBC Count : 7.86 K/uL  RBC Count : 3.56 M/uL  Hemoglobin : 10.7 g/dL  Hematocrit : 33.8 %  Platelet Count - Automated : 173 K/uL  Mean Cell Volume : 94.9 fl  Mean Cell Hemoglobin : 30.1 pg  Mean Cell Hemoglobin Concentration : 31.7 g/dL  Auto Neutrophil # : x  Auto Lymphocyte # : x  Auto Monocyte # : x  Auto Eosinophil # : x  Auto Basophil # : x  Auto Neutrophil % : x  Auto Lymphocyte % : x  Auto Monocyte % : x  Auto Eosinophil % : x  Auto Basophil % : x    01-23    138  |  101  |  37[H]  ----------------------------<  121[H]  3.6   |  27  |  0.96    Ca    9.2      23 Jan 2025 05:30  Phos  3.0     01-23  Mg     2.0     01-23          Urinalysis Basic - ( 23 Jan 2025 05:30 )    Color: x / Appearance: x / SG: x / pH: x  Gluc: 121 mg/dL / Ketone: x  / Bili: x / Urobili: x   Blood: x / Protein: x / Nitrite: x   Leuk Esterase: x / RBC: x / WBC x   Sq Epi: x / Non Sq Epi: x / Bacteria: x        Assessment:  This is a 44y Male w/ h/o     Plan:

## 2025-01-23 NOTE — PROGRESS NOTE ADULT - ASSESSMENT
effect (+ acute/subacute right cerebellar infarct) in setting of hypertensive emergency, and R cerebellar stroke transferred to [facility] for further neurosurgical care. 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 Epilepsy recalled as pt with vertical eye movements that appeared rhythmic and resolved after given AM Keppra +additional 500mg. Dilantin level 3.3. High suspicion for continued focal seizures, however given location of the bleed, suspect that repeating EEG will be limited utility. Unclear if EEG negative focal seizures or hemifacial spasm from pontine irritation Given low therapeutic level, will recommend increasing Dilantin and monitoring clinically for progression as will treat both.    Recommendations:  - Continue Keppra 1500mg Q12hrs  - Increase Phenytoin to 100/100/200mg.   - Recheck phenytoin level 1/25 AM  - Maintain seizure/fall/aspiration precautions    Discussed with Dr. Farley

## 2025-01-23 NOTE — CHART NOTE - NSCHARTNOTEFT_GEN_A_CORE
Admitting Diagnosis:   Patient is a 44y old  Male who presents with a chief complaint of brain stem bleed (23 Jan 2025 13:24)  PAST MEDICAL & SURGICAL HISTORY:  Acute myocardial infarction  CVA (cerebral vascular accident)      Current Nutrition Order:   Diet, NPO with Tube Feed:   Tube Feeding Modality: Gastrostomy  [address] Renal Support 1.8  Total Volume for 24 Hours (mL): 1080  Total Number of Cans: 5  Continuous  Starting Tube Feed Rate {mL per Hour}: 30  Increase Tube Feed Rate by (mL): 10     Every 4 hours  Until Goal Tube Feed Rate (mL per Hour): 60  Tube Feed Duration (in Hours): 18  Tube Feed Start Time: 10:00  Tube Feed Stop Time: 04:00  Banatrol TF     Qty per Day:  2 (12-18-24 @ 09:33) [Active]     PO Intake: N/A... remains NPO with tube feeds as primary source of nutrition (tube feeds running at goal rate)    GI Issues: no noted GI upset or tube feeding tolerance issues; last BM not documented, no bowel regimen noted, RD to follow up with nursing/medical team    Pain: no pain/discomfort noted    Skin Integrity: no pressure ulcers, noted with generalized, trace edema; PEG remains present; Mookie: 11      01-22-25 @ 07:01 - 01-23-25 @ 07:00  --------------------------------------------------------  IN: 1480 mL / OUT: 1200 mL / NET: 280 mL    01-23-25 @ 07:01 - 01-23-25 @ 14:02  --------------------------------------------------------  IN: 640 mL / OUT: 0 mL / NET: 640 mL        Labs:   01-23    138  |  101  |  37[H]  ----------------------------<  121[H]  3.6   |  27  |  0.96    Ca    9.2      23 Jan 2025 05:30  Phos  3.0     01-23  Mg     2.0     01-23    CAPILLARY BLOOD GLUCOSE    Medications:  MEDICATIONS  (STANDING):  acetylcysteine 10%  Inhalation 4 milliLiter(s) Inhalation every 6 hours  albuterol/ipratropium for Nebulization 3 milliLiter(s) Nebulizer every 6 hours  amLODIPine   Tablet 5 milliGRAM(s) Oral daily  artificial tears (preservative free) Ophthalmic Solution 1 Drop(s) Right EYE every 4 hours  baclofen 5 milliGRAM(s) Oral every 12 hours  chlorhexidine 0.12% Liquid 15 milliLiter(s) Oral Mucosa every 12 hours  chlorhexidine 2% Cloths 1 Application(s) Topical <User Schedule>  doxazosin 8 milliGRAM(s) Oral at bedtime  erythromycin   Ointment 1 Application(s) Right EYE at bedtime  fluticasone propionate 50 MICROgram(s)/spray Nasal Spray 1 Spray(s) Both Nostrils two times a day  heparin   Injectable 5000 Unit(s) SubCutaneous every 8 hours  influenza   Vaccine 0.5 milliLiter(s) IntraMuscular once  levETIRAcetam 1500 milliGRAM(s) Oral two times a day  ofloxacin 0.3% Solution 1 Drop(s) Right EYE four times a day  pantoprazole  Injectable 40 milliGRAM(s) IV Push daily  petrolatum Ophthalmic Ointment 1 Application(s) Both EYES two times a day  phenytoin   Suspension 150 milliGRAM(s) Oral <User Schedule>  phenytoin   Suspension 100 milliGRAM(s) Oral <User Schedule>  propranolol 5 milliGRAM(s) Oral every 12 hours  sodium chloride 0.65% Nasal 1 Spray(s) Both Nostrils two times a day    MEDICATIONS  (PRN):  acetaminophen   Oral Liquid .. 650 milliGRAM(s) Oral every 6 hours PRN Temp greater or equal to 38C (100.4F), Mild Pain (1 - 3)    Dosing Anthropometrics:  Height for BMI (FEET)	5 Feet  Height for BMI (INCHES)	8 Inch(s)  Height for BMI (CM)	172.72 Centimeter(s)  Weight for BMI (lbs)	176.4 lb  Weight for BMI (kg)	80 kg  Body Mass Index	              26.8  ideal body weight:               154 pounds / 70 kg   % ideal body weight             114%     Weight Change: No new wt obtained since admit, strongly recommend nursing to obtain new wt to track/trend changes. RD obtained bedweight today (1/23/25) of ~79.5kg, consistent with admission weight. RD to follow up with nursing.     Estimated energy needs:  Weight used for calculations	ideal body weight  Estimated Energy Needs Weight (lbs)	154 lb  Estimated Energy Needs Weight (kg)	69.8 kg  Estimated Energy Needs From (christiana/kg)	25  Estimated Energy Needs To (christiana/kg)	30  Estimated Energy Needs Calculated From (christiana/kg)	1745  Estimated Energy Needs Calculated To (christiana/kg)	2094  Weight used for calculations	ideal body weight  Estimated Protein Needs Weight (lbs)	154 lb  Estimated Protein Needs Weight (kg)	69.8 kg  Estimated Protein Needs From (g/kg)	1.2  Estimated Protein Needs To (g/kg)	1.4  Estimated Protein Needs Calculated From (g/kg)	83.76  Estimated Protein Needs Calculated To (g/kg)	97.72  Estimated Fluid Needs Weight (lbs)	154 lb  Estimated Fluid Needs Weight (kg)	69.8 kg  Estimated Fluid Needs From (ml/kg)	25  Estimated Fluid Needs To (ml/kg)	30  Estimated Fluid Needs Calculated From (ml/kg)	1745  Estimated Fluid Needs Calculated To (ml/kg)	2094  Other Calculations	Pt is >100% ideal body weight, thus ideal body weight used for all calculations. Needs adjusted for age, clinical course.     Subjective: This is a 44 year old male, unknown past medical history (reported stroke and MI by coworkers) presented to [facility] with AMS, Pt was working at [email or web address] when he was found down by coworkers. EMS called and pt brought to [facility] ED. Intubated, sedated, started on cardene for SBPs in 200s. CT head showed brain stem bleed. Transferred to NSICU for further management.    Patient seen at bedside on 8 [place] for nutrition follow up. RD spoke to nursing on the unit, pt was resting in bed. Current diet order: NPO with tube feeds of [employer] Renal Support, at goal rate of 60 cc/hr x18 hrs (providing ~ 1944 kcal, 86g protein, 713 mL free water), meeting 100% estimated nutrient needs. No tube feeding tolerance issues noted. GI within normal limits at this time as per flowsheets, RD to follow up with nursing and medical team about pt's last BM and bowel movment regularity. Labs: BUN elevated (37), medical team is aware, RD to monitor trends. Medications reviewed as above. RD will continue to follow, please see nutrition recommendations below.     Previous Nutrition Diagnosis: Inadequate Oral Intake related to inability to meet nutritional demands via PO as evidenced by NPO with tube feedings    Active [ x ]  Resolved [   ]    Goal: Pt will consume >/= 75% of estimated nutrient needs via tolerated route     Nutrition Recommendations:  1. Continue with current tube feed regimen to meet 100% estimated nutrient needs:  **ReefEdge Renal Support 1.8: at goal of 60mL/hour x 18 hours, providing 1080mL total volume, 1944 calories, 86g protein, ~713mL free water; this meets ~24 calories/kg ideal body weight, ~1.1g protein/kg ideal body weight**  **Provide Banatrol Tube Feeding prn**  **Maintain aspiration precautions at all times; monitor for signs/symptoms of intolerance**  2. Monitor weights (weekly), GI function, skin integrity; electrolytes, BMP, glucose, CBC per MD discretion *renal indices*  3. Pain and bowel regimen per medical team  4. Align nutrition with goals of care at all times  5. Recommend nursing to obtain new weights to track/trend changes    Education: deferred at this time related to pt resting in bed, and cognitive status.     Risk Level: High [   ] Moderate [ x ] Low [   ].

## 2025-01-23 NOTE — PROGRESS NOTE ADULT - SUBJECTIVE AND OBJECTIVE BOX
O/N Events: GEORGE  Subjective/ROS: No complaints. Denies HA, CP, SOB, n/v, changes in bowel/urinary habits.  12pt ROS otherwise negative.    VITALS  Vital Signs Last 24 Hrs  T(C): 36.9 (23 Jan 2025 05:02), Max: 37.1 (22 Jan 2025 22:23)  T(F): 98.5 (23 Jan 2025 05:02), Max: 98.8 (22 Jan 2025 22:23)  HR: 90 (23 Jan 2025 05:54) (68 - 94)  BP: 114/84 (23 Jan 2025 04:40) (114/84 - 157/104)  BP(mean): 95 (23 Jan 2025 04:40) (95 - 139)  RR: 15 (23 Jan 2025 05:54) (14 - 18)  SpO2: 99% (23 Jan 2025 05:54) (94% - 100%)    Parameters below as of 23 Jan 2025 05:54  Patient On (Oxygen Delivery Method): ventilator    O2 Concentration (%): 40    I&O's Summary    22 Jan 2025 07:01  -  23 Jan 2025 07:00  --------------------------------------------------------  IN: 1480 mL / OUT: 1200 mL / NET: 280 mL    CAPILLARY BLOOD GLUCOSE    PHYSICAL EXAM  General: minimally responsive on ventilator, NAD, no labored breathing   HEENT: trach collar in place, clean  Lungs: anterior exam, limited, no crackles, no wheezes  Heart: regular  Abdomen: soft, no distension, + BS  Extremities:  warm, trace edema, not following commands     MEDICATIONS  (STANDING):  acetylcysteine 10%  Inhalation 4 milliLiter(s) Inhalation every 6 hours  albuterol/ipratropium for Nebulization 3 milliLiter(s) Nebulizer every 6 hours  amLODIPine   Tablet 5 milliGRAM(s) Oral daily  artificial tears (preservative free) Ophthalmic Solution 1 Drop(s) Right EYE every 4 hours  baclofen 5 milliGRAM(s) Oral every 12 hours  chlorhexidine 0.12% Liquid 15 milliLiter(s) Oral Mucosa every 12 hours  chlorhexidine 2% Cloths 1 Application(s) Topical <User Schedule>  doxazosin 8 milliGRAM(s) Oral at bedtime  erythromycin   Ointment 1 Application(s) Right EYE at bedtime  fluticasone propionate 50 MICROgram(s)/spray Nasal Spray 1 Spray(s) Both Nostrils two times a day  heparin   Injectable 5000 Unit(s) SubCutaneous every 8 hours  influenza   Vaccine 0.5 milliLiter(s) IntraMuscular once  levETIRAcetam 1500 milliGRAM(s) Oral two times a day  ofloxacin 0.3% Solution 1 Drop(s) Right EYE four times a day  pantoprazole  Injectable 40 milliGRAM(s) IV Push daily  petrolatum Ophthalmic Ointment 1 Application(s) Both EYES two times a day  phenytoin   Suspension 150 milliGRAM(s) Oral <User Schedule>  phenytoin   Suspension 100 milliGRAM(s) Oral <User Schedule>  potassium chloride   Powder 40 milliEquivalent(s) Oral once  propranolol 5 milliGRAM(s) Oral every 12 hours  sodium chloride 0.65% Nasal 1 Spray(s) Both Nostrils two times a day    MEDICATIONS  (PRN):  acetaminophen   Oral Liquid .. 650 milliGRAM(s) Oral every 6 hours PRN Temp greater or equal to 38C (100.4F), Mild Pain (1 - 3)      Allergy Status Unknown      LABS                        10.7   7.86  )-----------( 173      ( 23 Jan 2025 05:30 )             33.8     01-23    138  |  101  |  37[H]  ----------------------------<  121[H]  3.6   |  27  |  0.96    Ca    9.2      23 Jan 2025 05:30  Phos  3.0     01-23  Mg     2.0     01-23        Urinalysis Basic - ( 23 Jan 2025 05:30 )    Color: x / Appearance: x / SG: x / pH: x  Gluc: 121 mg/dL / Ketone: x  / Bili: x / Urobili: x   Blood: x / Protein: x / Nitrite: x   Leuk Esterase: x / RBC: x / WBC x   Sq Epi: x / Non Sq Epi: x / Bacteria: x            IMAGING/EKG/ETC: reviewed

## 2025-01-23 NOTE — PROGRESS NOTE ADULT - SUBJECTIVE AND OBJECTIVE BOX
HPI:  Unknown age male, unknown past medical history (reported stroke and MI by coworkers) presented to [facility] with AMS, Pt was working at [employer] when he was found down by coworkers. EMS called and pt brought to [facility] ED. Intubated, sedated, started on cardene for SBPs in 200s. CT head showed brain stem bleed. Transferred to NSICU for further management.  (30 Sep 2024 12:55)    INTERVAL EVENTS:  continues to have intermittent right facial twitching    HOSPITAL COURSE:  9/30: transferred from [facility]. A line placed. Versed dc'd. Cy Rader at bedside, states pt has family in [place], cannot confirm medications or PMH other than stroke and MI. 250cc bolus 3% given. LR switched to NS. hydralazine 25q8 started, 3% started, switched propofol to precedex   10/1: stability CTH done. Added labetalol, started TF. Palliative consulted. ethics consulted to determine surrogate. febrile 103, pan cx sent  10/2: BD 2, GEORGE overnight. TF resumed. Desatt'd to 80s, FiO2 inc. to 50. Fentanyl given, ABG, CXR ordered. Maxxed on precedex, started on propofol for DARIEN -4 - -5. Precedex dc'd. Duonebs, mucomyst, hypertonic added. 3% dc'd. Cardene dc'd. Start vanc/CTX. Increased labetalol 200q8. MRSA negative, dc'd vanc. ETT pulled back 2cm x 2, good positioning after confirmatory chest xray. Ethics attempting to establish HCP with family. Na 159, starting FW 250q6 for range 150-155.   10/3: BD3, GEORGE o/n, neuro stable. Na elevating, FW increased to 300q6. Dc'd bowel reg for diarrhea. vEEG started. SQH 5000q8 tonight.   10/4: BD 4, albumn bolus, incr. LR to 80 2/2 incr. in Cr, LR to 10 0cc/hr for uptrending Cr. Started 7% hypersal for 48hrs and SL atropine for inline/oral thick secretions. Dc'd CTX and started ancef for MSSA in the sputum. Nephrology consulted for CKD, f/u recs. SBP 170s, given hydralazine 10mg IVP.   10/5: BD5, o/n 10mg IVP hydralazine given for SBP 170s and started on hydralazine 25q8 via OGT. 10mg IV push labetalol for SBP > 160s. RT placed for diarrhea.   10/6: BD6, o/n FW increased to 350q4 per nephrology recs. IV tylenol for temp 100.6, SBp 160s presumed uncomfortable.   10/7: BD7, overnight pancultured for temp 101.8F.   10/8: BD8. GEORGE. Cr bumped. decreased LR to 75cc/hr. Adding simethicone ATC. incr hydralazine 50mgTID. Incr labetalol 300mgTID. Na 145, decreased FWF to 250q6. Start precedex. FENa consistent with intrinsic kidney injury. Pend repeat renal US. Retaining up to 1.3L, bladder scans q6, straight cath PRN  10/9: BD 9. GEORGE overnight. Neuro stable. abd xray for distention w non-specific gas pattern, OGT to LIWS for morning. duonebs/mucomyst to q8 for improving secretions. Changed tube feeds to Jevity 1.5 20cc/hr, low rate due to abdominal distention, nepro dense and more difficult to digest. Tolerating CPAP, confirmed by ABG.   10/10: BD 10. GEORGE overnight. Neuro stable. (+) gabriel for urinary retention on bladder scan. inc TF to goal rate of 40cc/hr. family leaning toward pursuing trach/PEG. 1/2 amp for FS 81.   10/11: BD 11. GEORGE overnight. Neuro stable. Trach/PEG consults placed.   10/12: BD 12. GEORGE overnight. Neuro stable. MRI brain complete.   10/13: BD 13. Increase flomax. Hold SQH after PM dose for trach tm. IVL.   10/14: BD 14. GEORGE overnight, remains on AC/VC. Gabriel placed for urinary retention. Dc'd free water.  S/p trach with pulm. NGT placed and CXR confirmed in good position.   10/15: BD 15, GEORGE ovn. resumed feeds. spiked 101, pan cx sent.   10/16: BD 16. GEORGE ovn. Lokelma 5mg for K+ 5.4. Started vanc q 24/zosyn for empiric PNA coverage, IVF to 100/hr. PEG held for fever.   10/17: BD 17,  ordered serum osm and urine osm for am. Started sinemet for neurostimulation. Increased cardura to 0.8. Started FW 100q4, dc'd IVF. MRSA negative, dc'd vanc. NGT replaced d/t coiling.   10/18: BD 18, GEORGE overnight, neuro stable. Amantadine added for neurostim. zosyn changed to unasyn for acinetobacter baumannii, failed TOV and required SC  10/19: BD 19, GEORGE ovn. cardura 2mg added for retention. labetalol decreased 200q8, hydralazine decreased 25q8. Gabriel replaced.   10/20: BD20, GEORGE overnight. NGT dislodged, replaced. PEG tomorrow w/ gen surg, FW increased to 150q4 and labetalol decreased to 100q8, lokelma given for hyperkalemia.   10/21: BD 21. POD0 PEG placement with Gen surg. decr labetolol to 50q8, incr. cardura to 0.4, started lokelma and phoslo, dc gabriel POD0 PEG placement with Gen surg.  10/22: BD 22. Plan to start TF today via PEG. dc labetalol, Following ophtho recs. Increased apnea settings - found to be in cheyne-moe respiration. CPAP 5/5.  10/23: BD 23. hydralazine d/c'd, trach collar trial today. Rectal tube placed at 6am.  10/24: BD24, o/n lokelma held due to diarrhea. Free water 100q6 resumed. dc'd tamsulosin, amantadine. Incr'd cardura to 8mg qhs. Dc'd FW. Switched jevity to nepro. gabriel placed for high urine output. Started SL atropine for oral secretions. Dc'd free water.  10/25: BD25, o/n decreased suctioning requirements to > q4hrs, GEORGE. Cr improving, cont phoslo, lokelma held at this time. Gabriel placed yest, cont. Tolerating trach collar. Given 500cc plasmalyte bolus for ANIKA. Dc'd sinemet.   10/26: BD26, o/n resumed lokelma 5mg daily and resumed 100cc free water q6hrs. Change in neuro status with new right pupillary dilation with anisocoria (right pupil 6mm fixed and left pupil 3mm briskly reactive). Given 23.4% NaCl bullet, taken for emergent CTH showing mostly resolved pontine hemorrhage, continued brainstem hypodensity likely edema d/t hemorrhage, no new hemorrhage or infarct, no herniation, mild increase in size of left lateral ventricle. Vitals remaine stable. Na goal > 140.   10/27: BD27, o/n GEORGE.Neuro stable. Pend stepdown with airway bed.   10/28: BD 28. GEORGE overnight. Neuro stable. Miralax ordered. Gabriel removed, pending TOV.  10/29: BD 29. GEORGE o/n. Given 2L NS over 8 hrs for increased BUN/Cr ratio. Gabriel placed for frequent straight cath.   10/30: BD 30.   10/31: BD 31. GEORGE overnight. Na 149, increased free water to 200q6. 1L NS for uptrending BUN.   11/1: BD 32. GEORGE overnight. Given 1L NS for dehydration. Na 146, increased FW to 250q6.   11/2: BD 33 GEORGE overnight, neuro stable, given 500cc bolus for net negative status and tachycardia   11/3: BD 34, GEORGE overnight, neuro stable. Patient remains tachycardic, EKG showing sinus tachycardia, given additional 500cc NS bolus. Febrile to 101.9F, pan cultured (without UA), CXR WNL, given tylenol.   11/4: BD 35, GEORGE overnight, neuro stable. Given 1L NS for tachycardia. sputum (+) for stenotropohomas maltophilia.   11/5: BD 36 GEORGE overnight, neuro stable. Vancomycin dc'd. Chest PT BID. ID consulted, cont zosyn.  11/6: BD 37. blood cx + klebsiella dc zosyn changed to cefepime, CTAP ordered, rpt blood cx sent.    11/7: BD 38. Pending CT A/P, given 250cc bolus and starting maintenance fluids overnight. Pending CT A/P after bolus   11/8: BD 39. CT CAP negative for infection.   11/9: BD 40. GEORGE overnight.  11/10: BD 41. GEORGE overnight. desat to 85 on trach collar, O2 inc to 10L and 100%, O2 sat inc to 95. pt tachy to 110s, euvolemic. given tylenol. ABG and CXR ordered. spiked fever, pancultured, RVP negative. AM ABG w pO2 79, rpt w pO2 79. pt appears comfortable, satting 94%.   11/11: BD 42. GEORGE overnight. pt became tachy to 130s, desat to 90 on 100% FiO2 and 10L. suctioned, (+) productive cough. temp 101.4, given 1g IV tylenol and 500cc NS bolus for euvolemia. fever and HR downtrending. LE dopplers negative for dvt  11/12: BD 43, GEORGE ovn, fever and HR downtrending, satting 97% 70% FIO2  11/13: BD 44, GEORGE ovn. started standing tylenol x24 hours for tachycardia. desat to 80s, o2 increased. CXR stable, pending CTA PE protocol.   11/14: BD 45, GEORGE overnight, neuro stable. resp therapy dec FiO2 to 70%.   11/15: BD 46, GEORGE overnight, neuro stable.  Rapid called for desaturation 30s, tachycardic 140s. Patient bagged, 100% fio2, heavily suctioned. CXR/POCUS unremarkable. ABG c/w desaturation. WBC 14.71. Afebrile. O2 improved to 90s and patient upgraded to ICU. ABG paO2 30s improved to 89 on vent. IV Tylenol x 1, sputum sent. Start protonix while o-n vent.   11/16: BD 47. POCUS showed collapsable IVCF, given 1L bolus. Vanco/Cefpime added empriic for PNA, NGT feeds restarted. MRSA swab neg, Vanc DC'd.   11/17: BD 48. GEORGE overnight. 1l bolus for tachycardia. Spiked to 101, cultured. 500cc bolus for tachycardia, tachy to 148 given 25mcg fentanyl, 250cc albumin, 1.5L bolus. 5 IV lopressor with response HR to 100s. +Stenotrophomonas on sputum cx.   11/18: BD 49. GEORGE overnight. Consulted ID, cefepime switched to bactrim x7days. Started hydrochlorothiazine 12.5mg daily.  11/19: BD 50. Tachy 120s, given tylenol and 500cc NS. Tolerating 5/5, switched to TCx. Holding phos binder. D/c Bactrim. D/c gabriel, f/u TOV. Dc'd PPI.   11/20: BD 51. GEORGE ovn. 1600 satting low 90s, mildly tachy to 110s, afebrile, RR wnl. O2 improved to mid 90s while inc O2 to 100% on TCx. CPAP 5/5 placed back on.  11/21: BD 52, GEORGE ovn, tolerating CPAP 5/5. Switch to trach collar during the day if tolerating well. HCTZ held for Cr bump, straight cath frequence increased to q4  11/22: BD 53, GEORGE ovn. Resumed phoslo. Gabriel placed. Resumed HCTZ.   11/23: BD 54. Holding tylenol in setting of possible fever, will require pan cx if febrile. Cr improved today. Cont CPAP. Bowel regimen held i/s/o diarrhea. FOBT negative.  11/24: BD 55. GEORGE overnight. Neuro stable. HCTZ dc'ed, started lisinopril 5. Lokelma dc'ed for K 3.7.   11/25: BD 56, GEORGE overnight. Neuro stable. dc'd lisinopril 5mg. Gabriel dc'd. TOV. 1545 noted to be hypotensive, MAP 50, in supine position on chair, HR 60s, afebrile, O2 96%. Given 1L cc NS bolus, placed back on bed in reverse trendelenberg, improved to map of 66. Neostick at bedside. Vitals check q1h. Dc'ed amlodipine. Failed TOV, bladder scan q6, sc prn. Added back Senna.   11/26: BD 57, GEORGE overnight. Neuro stable. Dc'd phoslo.   11/27: BD 58, GEORGE overnight. Neuro stable.    11/28: BD 59. Gabriel replaced.   11/29: GEORGE.  11/30: GEORGE, neuro stable.   12/1: BD 62, GEORGE overnight  12/2: BD 63, GEORGE overnight.; Given 1L bolus of LR for uptrending BUN/Cr.  12/3: BD 64. Reinstated eye gtt/moisture chamber given increased Rt eye injection  12/4: BD 65. GEORGE overnight. Attempted to speak with ophtho regarding eyelid weight/closure but no answer, full mailbox.   12/5: BD 66, GEORGE overnight, bowel regimen increased and had BM.   12/6: BD 67, GEORGE overnight, neuro stable.  12/7: GEORGE overnight, neuro stable. /110s, given x1 hydralazine 10 mg IVP. Restarted home amlodipine 5mg.  12/8: GEORGE. OOB to chair.     12/11: GEORGE, mucomyst added for thick secretions, simethicone for abd distension, abd xray with stool burden, increased bowel regimen.   12/12: GEORGE overnight.   12/13: GEORGE overnight.   12/14: GEORGE overnight  12/15: o/n Patient became tachycardic HR 120s and 10 minutes later O2 sat dropped as low as 89%. Patient suctioned without improvement in O2 sat and tube feeds found in suction catheter. TFs held.  STAT CXR ordered. STAT labs sent. Respiratory therapist called to bedside and patient trach connected to ventilator. After connection to ventilator and further suctioning O2 sat improved to 97% but patient HR remains 120-130s. Upgraded to NSICU for further management. Vancomycin and zosyn started. CTA  chest PE protocol and CTH ordered. Blood cultures sent.given 500cc bolus, rpt ABG sent pO2 243, CTH and CTA chest done. FS while NPO, FiO2 dec 50 pending ABG. sputum cx positive for few GPC and GNR.   12/16: GEORGE overnight, restarted amlodipine, troponin 75   12/17: GEORGE overnight, neuro stable. Attempted CPAP this morning, did not tolerate and back on full vent support. Dc'd Vanc. Tachycardia to 140s, noted extremities to be twitching along with jaw twitching. Given 2g of Keppra, total 4mg ativan and placed on EEG, full set of labs and lactate negative. Resumed trickle feeds at 20cc/hr. Given 250cc albumin for tachycardia.   12/18: GEORGE overnight. Neuro stable. CTH stable. EEG negative and dc'd.   12/19: GEORGE overnight. neuro stable. SIMV most of day, AC/VC at night.   12/20: GEORGE overnight, neuro stable. remains on VC/AC  12/21: GEORGE ovn. 1L bolus for tachy to 120s-130s.   12/22: GEORGE ovn. Tachy to 120s, given tylenol and IVF. 2mg IV ativan given for L jaw twitching. Sx resolved for 3 minutes and started again. Epilepsy contacted. Given 2mg IV ativan. Continued twitching. Increased keppra to 1500mg BID, 2mg ativan given. Propranolol started for refractory tachycardia. vEEG ordered. albumin given. POCUS performed, no b lines. Started on fosphenytoin, loaded w 20mg/kg then 100mg TID. febrile, pancx, started cefepime 2g q8, vancomycin  12/23: GEORGE ovn. +focal motor seizures on eeg. ID consulted. Vancomycin dc'ed as per ID.  12/24: GEORGE ovn, neuro stable. Baclofen 5 mg q12 started for hiccups. Na 129 from 134. Urine lytes c/w SIADH. Given 250cc 3% bolus. CT chest for infection w/u - f/u read. Repeat Na 136. UA negative  12/25: GEORGE ovn.   12/26: GEORGE ovn  12/27: GEORGE overnight. Blood cx neg @ 4 days, DC cefepime per medicine (sputum colonized).   12/28: Desaturation o/n to 80's, CXR obtained, pulse ox changed and sats resolved. Na 133 from 138, 250cc 3% bolus given. Cefepime resumed until 12/30 per ID. Rpt Na 138.  12/29: GEORGE overnight. Na stable.   12/30: GEORGE overnight. Family meeting with son, pt now DNR.   12/31: GEORGE overnight.   1/1: GEORGE overnight, neuro stable.  1/2: GEORGE overnight, neuro stable. FW decreased to 100q6.   1/3: GEORGE overnight, neuro stable. fosphenytoin IV changed to pheytoin PO via PEG per epilepsy recommendations  1/4: GEORGE overnight, neuro stable. FW incr. to 100q4  1/5: GEORGE overnight, neuro stable.   1/6: GEORGE overnight, neuro stable   1/7: GEORGE overnight, neuro stable. BUN/Cr increased, increased FW to 200q6. Given 1L bolus of LR over 2 hours. Gabriel d/c'd, voiding, continue bladder scan q6.   1/8: GEORGE overnight, neuro stable. BUN/Cr improving.  1/9: GEORGE overnight, neuro stable.   1/10: GEORGE overnight, neuro stable.   1/11: GEORGE overnight, neuro stable, vent settings stable.   1/12: GEORGE overnight, neuro stable. Febrile to 102F, f/u pan cx, chest xray, given tylenol. Zosyn started.   1/13: GEORGE overnight, neuro stable, cont Zosyn for presumed PNA, prelim sputum cx growing; few GS neg diplococci, mod GS neg rods, rare GS + rods, fever trend improved. Free water increased to [id number].   1/14: GEORGE overnight. pending renal US for elevated Cr  1/15: GEORGE ovn. renal US complete.   1/16: GEORGE overnight, neuro stable   1/17: GEORGE overnight, neuro stable   1/18: GEORGE overnight, neuro stable.   1/19: GEORGE overnight, neuro stable. Propranolol dec to 5q8.   1/20: GEORGE overnight, neuro stable. Weaned propranolol to 5mg BID.   1/21: GEORGE ovn, in AM having rapid vertical eye movements with facial twitching, 2g total keppra given for AM dose and phenytoin level ordered, vital signs stable. CTH stable. Phenytoin increased to 100/100/150mg per epilepsy  1/22: GEORGE ovn. IV hydral x 1 for SBP 170s.   1/23: Cont to have focal motor seizures.     Vital Signs Last 24 Hrs  T(C): 37.1 (22 Jan 2025 22:23), Max: 37.1 (22 Jan 2025 22:23)  T(F): 98.8 (22 Jan 2025 22:23), Max: 98.8 (22 Jan 2025 22:23)  HR: 92 (23 Jan 2025 00:05) (68 - 94)  BP: 140/98 (23 Jan 2025 00:05) (114/80 - 157/104)  BP(mean): 113 (23 Jan 2025 00:05) (93 - 139)  RR: 17 (23 Jan 2025 00:05) (13 - 18)  SpO2: 100% (23 Jan 2025 00:05) (94% - 100%)    Parameters below as of 23 Jan 2025 00:05  Patient On (Oxygen Delivery Method): ventilator    O2 Concentration (%): 40    I&O's Summary    21 Jan 2025 07:01  -  22 Jan 2025 07:00  --------------------------------------------------------  IN: 2140 mL / OUT: 1935 mL / NET: 205 mL    22 Jan 2025 07:01  -  23 Jan 2025 00:27  --------------------------------------------------------  IN: 940 mL / OUT: 600 mL / NET: 340 mL        PHYSICAL EXAM:  Constitutional: Pt found in bed in NAD  ENT: PERRL, vertical EOMi  Respiratory: +trach, breathing non-labored, symmetrical chest wall movement  Cardiovascuar: RRR, no murmurs  Gastrointestinal: +PEG, abdomen soft, non tender  Neurological:  AAOX3 with squeezing R hand communication. Verbal function not intact, intermittently follows commands   Motor: RUE handgrip to command 2/5, RLE w/d, LUE 0/5, LLE withdraws to noxious   Pronator Drift: unable to assess     LABS:                        11.1   6.97  )-----------( 184      ( 22 Jan 2025 05:30 )             35.7     01-22    135  |  97  |  38[H]  ----------------------------<  105[H]  3.4[L]   |  25  |  1.00    Ca    9.3      22 Jan 2025 05:30  Phos  3.6     01-22  Mg     2.1     01-22        Urinalysis Basic - ( 22 Jan 2025 05:30 )    Color: x / Appearance: x / SG: x / pH: x  Gluc: 105 mg/dL / Ketone: x  / Bili: x / Urobili: x   Blood: x / Protein: x / Nitrite: x   Leuk Esterase: x / RBC: x / WBC x   Sq Epi: x / Non Sq Epi: x / Bacteria: x          CAPILLARY BLOOD GLUCOSE          Drug Levels: [] N/A  Phenytoin Level, Serum: 3.3 ug/mL (01-21 @ 07:59)    CSF Analysis: [] N/A      Allergies    Allergy Status Unknown    Intolerances      MEDICATIONS:  Antibiotics:    Neuro:  acetaminophen   Oral Liquid .. 650 milliGRAM(s) Oral every 6 hours PRN  baclofen 5 milliGRAM(s) Oral every 12 hours  levETIRAcetam 1500 milliGRAM(s) Oral two times a day  phenytoin   Suspension 150 milliGRAM(s) Oral <User Schedule>  phenytoin   Suspension 100 milliGRAM(s) Oral <User Schedule>    Anticoagulation:  heparin   Injectable 5000 Unit(s) SubCutaneous every 8 hours    OTHER:  acetylcysteine 10%  Inhalation 4 milliLiter(s) Inhalation every 6 hours  albuterol/ipratropium for Nebulization 3 milliLiter(s) Nebulizer every 6 hours  amLODIPine   Tablet 5 milliGRAM(s) Oral daily  artificial tears (preservative free) Ophthalmic Solution 1 Drop(s) Right EYE every 4 hours  chlorhexidine 0.12% Liquid 15 milliLiter(s) Oral Mucosa every 12 hours  chlorhexidine 2% Cloths 1 Application(s) Topical <User Schedule>  doxazosin 8 milliGRAM(s) Oral at bedtime  erythromycin   Ointment 1 Application(s) Right EYE at bedtime  fluticasone propionate 50 MICROgram(s)/spray Nasal Spray 1 Spray(s) Both Nostrils two times a day  influenza   Vaccine 0.5 milliLiter(s) IntraMuscular once  ofloxacin 0.3% Solution 1 Drop(s) Right EYE four times a day  pantoprazole  Injectable 40 milliGRAM(s) IV Push daily  petrolatum Ophthalmic Ointment 1 Application(s) Both EYES two times a day  propranolol 5 milliGRAM(s) Oral every 12 hours  sodium chloride 0.65% Nasal 1 Spray(s) Both Nostrils two times a day    IVF:    CULTURES:  Culture Results:   >=3 organisms. Probable collection contamination. (01-12 @ 15:25)  Culture Results:   No growth at 5 days (01-12 @ 12:50)    RADIOLOGY & ADDITIONAL TESTS:      ASSESSMENT:  44M PMH ?stroke/MI present to [facility] after collapsing at work. Decorticate posturing, vomiting, intubated for airway protection. 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 Transferred to [facility] for further management. s/p trach 10/14. s/p peg 10/21. Re-upgrade to ICU 2/2 desaturation event and suctioning requirements 11/15. Re-upgrade to NSICU 12/15 2/2 desaturation and tachycardia.    Neuro:  - neuro/vitals q4h  - pain control: tylenol prn  - seizure tx: keppra 1500mg BID, phenytoin 100/100/150  - vEEG (10/3-4) negative, (10/17-10/19) negative, (12/17-12/18) negative, (12/22-12/25) +focal motor seizures not correlating w/ EEG  - CTH 9/30: enlarged pontine hemorrhage, CTH 10/3: stable, CTH 10/25: mostly resolved pontine hemorrhage, CTH 12/15: R mastoid air cell opacification; acute otitis media vs sterile effusion, CTH 12/18: stable. CTH 1/21 stable.  - MRI brain 10/12: parenchymal hemorrhage, acute/subacute R cerebellar stroke      CV:  - -160  - tachycardia: propranolol 5mg BID   - HTN: amlodipine 5mg  - echo (9/30) EF 75%, repeat 12/16: 57%    Resp:  - trach to vent, AC/VC 40/400/14/6  - Secretions: duonebs/mucomyst/chest PT q6h     GI:  - TF via PEG (placed 10/21 by gen surg)  - bowel regimen held for loose stool, last BM 1/1  - baclofen 5q12 for hiccups    Renal:  - IVL, FW 200q4 for hydration   - Urinary retenion: Gabriel removed 1/7, Voiding  - CKD: trend BUN/Cr  - renal US 10/1, 10/8, 1/15: Increased bilateral renal echogenicity consistent with medical renal disease    Endo:  - A1c 5.4    Heme:  - DVT ppx: SCDs, SQH 5000u q8h   - LE dopplers negative 12/16    ID:  - last pan cx 1/12   - empiric PNA: cefepime (12/22-12/30), s/p vanc (12/22-12/23), s/p zosyn (12/15-12/20), s/p vanc (12/15-12/16), s/p zosyn (1/12 -1/18)  - s/p Stenotrophomonas maltophilia PNA: s/p Bactrim (11/18-11/19) s/p Cefepime (11/16-11/18)  - 11/3, (+) sputum for stenotrophomas maltophlia, blood cx (+) klebsiella, cefepime 2gq12 (11/6 - 11/12)   - empiric tx: s/p zosyn (11/3-11/6) , s/p vanc  (11/3-11/5)  - S/p Ancef (10/4-10/14) for PNA, and s/p Unasyn (10/18-10/23) +actinobacter baumanii     MISC:  - ophtho consult for keratitis  - erythromycin ointment R eye q4hrs, ofloxacin ointment R eye QID, artificial tears R eye q2hrs, moisture chamber at bedtime    Dispo: SDU status, DNR, pending PRUCOL for benefits     D/w Dr. D'Amico

## 2025-01-24 LAB
ANION GAP SERPL CALC-SCNC: 11 MMOL/L — SIGNIFICANT CHANGE UP (ref 5–17)
BUN SERPL-MCNC: 38 MG/DL — HIGH (ref 7–23)
CALCIUM SERPL-MCNC: 9 MG/DL — SIGNIFICANT CHANGE UP (ref 8.4–10.5)
CHLORIDE SERPL-SCNC: 100 MMOL/L — SIGNIFICANT CHANGE UP (ref 96–108)
CO2 SERPL-SCNC: 23 MMOL/L — SIGNIFICANT CHANGE UP (ref 22–31)
CREAT SERPL-MCNC: 0.95 MG/DL — SIGNIFICANT CHANGE UP (ref 0.5–1.3)
EGFR: 100 ML/MIN/1.73M2 — SIGNIFICANT CHANGE UP
EGFR: 100 ML/MIN/1.73M2 — SIGNIFICANT CHANGE UP
GLUCOSE SERPL-MCNC: 104 MG/DL — HIGH (ref 70–99)
HCT VFR BLD CALC: 34.4 % — LOW (ref 39–50)
HGB BLD-MCNC: 10.7 G/DL — LOW (ref 13–17)
MAGNESIUM SERPL-MCNC: 2 MG/DL — SIGNIFICANT CHANGE UP (ref 1.6–2.6)
MCHC RBC-ENTMCNC: 30 PG — SIGNIFICANT CHANGE UP (ref 27–34)
MCHC RBC-ENTMCNC: 31.1 G/DL — LOW (ref 32–36)
MCV RBC AUTO: 96.4 FL — SIGNIFICANT CHANGE UP (ref 80–100)
NRBC # BLD: 0 /100 WBCS — SIGNIFICANT CHANGE UP (ref 0–0)
NRBC BLD-RTO: 0 /100 WBCS — SIGNIFICANT CHANGE UP (ref 0–0)
PHOSPHATE SERPL-MCNC: 2.9 MG/DL — SIGNIFICANT CHANGE UP (ref 2.5–4.5)
PLATELET # BLD AUTO: 164 K/UL — SIGNIFICANT CHANGE UP (ref 150–400)
POTASSIUM SERPL-MCNC: 4 MMOL/L — SIGNIFICANT CHANGE UP (ref 3.5–5.3)
POTASSIUM SERPL-SCNC: 4 MMOL/L — SIGNIFICANT CHANGE UP (ref 3.5–5.3)
RBC # BLD: 3.57 M/UL — LOW (ref 4.2–5.8)
RBC # FLD: 13.8 % — SIGNIFICANT CHANGE UP (ref 10.3–14.5)
SODIUM SERPL-SCNC: 134 MMOL/L — LOW (ref 135–145)
WBC # BLD: 10.36 K/UL — SIGNIFICANT CHANGE UP (ref 3.8–10.5)
WBC # FLD AUTO: 10.36 K/UL — SIGNIFICANT CHANGE UP (ref 3.8–10.5)

## 2025-01-24 PROCEDURE — 99233 SBSQ HOSP IP/OBS HIGH 50: CPT

## 2025-01-24 PROCEDURE — 99232 SBSQ HOSP IP/OBS MODERATE 35: CPT

## 2025-01-24 PROCEDURE — 71045 X-RAY EXAM CHEST 1 VIEW: CPT | Mod: 26

## 2025-01-24 RX ADMIN — IPRATROPIUM BROMIDE AND ALBUTEROL SULFATE 3 MILLILITER(S): .5; 2.5 SOLUTION RESPIRATORY (INHALATION) at 00:17

## 2025-01-24 RX ADMIN — Medication 1 SPRAY(S): at 06:09

## 2025-01-24 RX ADMIN — IPRATROPIUM BROMIDE AND ALBUTEROL SULFATE 3 MILLILITER(S): .5; 2.5 SOLUTION RESPIRATORY (INHALATION) at 06:11

## 2025-01-24 RX ADMIN — ACETYLCYSTEINE 4 MILLILITER(S): 200 INHALANT RESPIRATORY (INHALATION) at 06:11

## 2025-01-24 RX ADMIN — OFLOXACIN 1 DROP(S): 3 SOLUTION OPHTHALMIC at 12:12

## 2025-01-24 RX ADMIN — Medication 1 DROP(S): at 14:57

## 2025-01-24 RX ADMIN — ACETYLCYSTEINE 4 MILLILITER(S): 200 INHALANT RESPIRATORY (INHALATION) at 18:22

## 2025-01-24 RX ADMIN — Medication 1 DROP(S): at 10:55

## 2025-01-24 RX ADMIN — Medication 1 APPLICATION(S): at 06:10

## 2025-01-24 RX ADMIN — ACETYLCYSTEINE 4 MILLILITER(S): 200 INHALANT RESPIRATORY (INHALATION) at 23:52

## 2025-01-24 RX ADMIN — IPRATROPIUM BROMIDE AND ALBUTEROL SULFATE 3 MILLILITER(S): .5; 2.5 SOLUTION RESPIRATORY (INHALATION) at 12:12

## 2025-01-24 RX ADMIN — Medication 1 DROP(S): at 18:23

## 2025-01-24 RX ADMIN — Medication 650 MILLIGRAM(S): at 06:15

## 2025-01-24 RX ADMIN — AMLODIPINE BESYLATE 5 MILLIGRAM(S): 10 TABLET ORAL at 06:10

## 2025-01-24 RX ADMIN — DOXAZOSIN MESYLATE 8 MILLIGRAM(S): 8 TABLET ORAL at 21:39

## 2025-01-24 RX ADMIN — LEVETIRACETAM 1500 MILLIGRAM(S): 10 INJECTION, SOLUTION INTRAVENOUS at 06:10

## 2025-01-24 RX ADMIN — IPRATROPIUM BROMIDE AND ALBUTEROL SULFATE 3 MILLILITER(S): .5; 2.5 SOLUTION RESPIRATORY (INHALATION) at 23:52

## 2025-01-24 RX ADMIN — Medication 200 MILLIGRAM(S): at 20:00

## 2025-01-24 RX ADMIN — Medication 15 MILLILITER(S): at 18:24

## 2025-01-24 RX ADMIN — BACLOFEN 5 MILLIGRAM(S): 10 INJECTION INTRATHECAL at 18:37

## 2025-01-24 RX ADMIN — Medication 100 MILLIGRAM(S): at 12:09

## 2025-01-24 RX ADMIN — ACETYLCYSTEINE 4 MILLILITER(S): 200 INHALANT RESPIRATORY (INHALATION) at 12:13

## 2025-01-24 RX ADMIN — BACLOFEN 5 MILLIGRAM(S): 10 INJECTION INTRATHECAL at 06:10

## 2025-01-24 RX ADMIN — Medication 15 MILLILITER(S): at 06:10

## 2025-01-24 RX ADMIN — LEVETIRACETAM 1500 MILLIGRAM(S): 10 INJECTION, SOLUTION INTRAVENOUS at 18:27

## 2025-01-24 RX ADMIN — HEPARIN SODIUM 5000 UNIT(S): 1000 INJECTION INTRAVENOUS; SUBCUTANEOUS at 21:39

## 2025-01-24 RX ADMIN — Medication 1 APPLICATION(S): at 06:09

## 2025-01-24 RX ADMIN — OFLOXACIN 1 DROP(S): 3 SOLUTION OPHTHALMIC at 18:22

## 2025-01-24 RX ADMIN — OFLOXACIN 1 DROP(S): 3 SOLUTION OPHTHALMIC at 00:18

## 2025-01-24 RX ADMIN — HEPARIN SODIUM 5000 UNIT(S): 1000 INJECTION INTRAVENOUS; SUBCUTANEOUS at 14:58

## 2025-01-24 RX ADMIN — ACETYLCYSTEINE 4 MILLILITER(S): 200 INHALANT RESPIRATORY (INHALATION) at 00:17

## 2025-01-24 RX ADMIN — OFLOXACIN 1 DROP(S): 3 SOLUTION OPHTHALMIC at 06:08

## 2025-01-24 RX ADMIN — Medication 1 SPRAY(S): at 18:25

## 2025-01-24 RX ADMIN — Medication 1 DROP(S): at 06:08

## 2025-01-24 RX ADMIN — Medication 40 MILLIGRAM(S): at 12:09

## 2025-01-24 RX ADMIN — Medication 1 DROP(S): at 21:39

## 2025-01-24 RX ADMIN — FLUTICASONE PROPIONATE 1 SPRAY(S): 50 SPRAY, METERED NASAL at 18:26

## 2025-01-24 RX ADMIN — HEPARIN SODIUM 5000 UNIT(S): 1000 INJECTION INTRAVENOUS; SUBCUTANEOUS at 06:11

## 2025-01-24 RX ADMIN — Medication 1 DROP(S): at 02:32

## 2025-01-24 RX ADMIN — ERYTHROMYCIN 1 APPLICATION(S): 5 OINTMENT OPHTHALMIC at 21:38

## 2025-01-24 RX ADMIN — FLUTICASONE PROPIONATE 1 SPRAY(S): 50 SPRAY, METERED NASAL at 06:09

## 2025-01-24 RX ADMIN — IPRATROPIUM BROMIDE AND ALBUTEROL SULFATE 3 MILLILITER(S): .5; 2.5 SOLUTION RESPIRATORY (INHALATION) at 18:23

## 2025-01-24 RX ADMIN — OFLOXACIN 1 DROP(S): 3 SOLUTION OPHTHALMIC at 23:51

## 2025-01-24 RX ADMIN — Medication 1 APPLICATION(S): at 18:26

## 2025-01-24 RX ADMIN — Medication 100 MILLIGRAM(S): at 06:08

## 2025-01-24 NOTE — PROGRESS NOTE ADULT - SUBJECTIVE AND OBJECTIVE BOX
O/N Events: GEORGE  Subjective/ROS: Unable to verbalize. Denies HA, CP, SOB, n/v, changes in bowel/urinary habits.  12pt ROS otherwise negative.    VITALS  Vital Signs Last 24 Hrs  T(C): 37.9 (24 Jan 2025 07:57), Max: 37.9 (24 Jan 2025 04:34)  T(F): 100.2 (24 Jan 2025 07:57), Max: 100.2 (24 Jan 2025 04:34)  HR: 104 (24 Jan 2025 04:50) (86 - 104)  BP: 138/92 (24 Jan 2025 04:50) (120/92 - 140/95)  BP(mean): 110 (24 Jan 2025 04:50) (103 - 113)  RR: 17 (24 Jan 2025 04:50) (17 - 19)  SpO2: 100% (24 Jan 2025 04:50) (95% - 100%)    Parameters below as of 24 Jan 2025 04:50  Patient On (Oxygen Delivery Method): ventilator    O2 Concentration (%): 40    I&O's Summary    23 Jan 2025 07:01  -  24 Jan 2025 07:00  --------------------------------------------------------  IN: 1140 mL / OUT: 900 mL / NET: 240 mL        CAPILLARY BLOOD GLUCOSE          PHYSICAL EXAM  General: minimally responsive on ventilator, NAD, no labored breathing   HEENT: trach collar in place, clean  Lungs: anterior exam, limited, no crackles, no wheezes  Heart: regular  Abdomen: soft, no distension, + BS  Extremities:  warm, trace edema, not following commands     MEDICATIONS  (STANDING):  acetylcysteine 10%  Inhalation 4 milliLiter(s) Inhalation every 6 hours  albuterol/ipratropium for Nebulization 3 milliLiter(s) Nebulizer every 6 hours  amLODIPine   Tablet 5 milliGRAM(s) Oral daily  artificial tears (preservative free) Ophthalmic Solution 1 Drop(s) Right EYE every 4 hours  baclofen 5 milliGRAM(s) Oral every 12 hours  chlorhexidine 0.12% Liquid 15 milliLiter(s) Oral Mucosa every 12 hours  chlorhexidine 2% Cloths 1 Application(s) Topical <User Schedule>  doxazosin 8 milliGRAM(s) Oral at bedtime  erythromycin   Ointment 1 Application(s) Right EYE at bedtime  fluticasone propionate 50 MICROgram(s)/spray Nasal Spray 1 Spray(s) Both Nostrils two times a day  heparin   Injectable 5000 Unit(s) SubCutaneous every 8 hours  influenza   Vaccine 0.5 milliLiter(s) IntraMuscular once  levETIRAcetam 1500 milliGRAM(s) Oral two times a day  ofloxacin 0.3% Solution 1 Drop(s) Right EYE four times a day  pantoprazole  Injectable 40 milliGRAM(s) IV Push daily  petrolatum Ophthalmic Ointment 1 Application(s) Both EYES two times a day  phenytoin   Suspension 100 milliGRAM(s) Oral <User Schedule>  phenytoin   Suspension 200 milliGRAM(s) Oral <User Schedule>  propranolol 5 milliGRAM(s) Oral every 12 hours  sodium chloride 0.65% Nasal 1 Spray(s) Both Nostrils two times a day    MEDICATIONS  (PRN):  acetaminophen   Oral Liquid .. 650 milliGRAM(s) Oral every 6 hours PRN Temp greater or equal to 38C (100.4F), Mild Pain (1 - 3)      Allergy Status Unknown      LABS                        10.7   10.36 )-----------( 164      ( 24 Jan 2025 06:56 )             34.4     01-24    134[L]  |  100  |  38[H]  ----------------------------<  104[H]  4.0   |  23  |  0.95    Ca    9.0      24 Jan 2025 06:56  Phos  2.9     01-24  Mg     2.0     01-24    TPro  6.9  /  Alb  3.2[L]  /  TBili  <0.2  /  DBili  0.1  /  AST  15  /  ALT  38  /  AlkPhos  178[H]  01-23      Urinalysis Basic - ( 24 Jan 2025 06:56 )    Color: x / Appearance: x / SG: x / pH: x  Gluc: 104 mg/dL / Ketone: x  / Bili: x / Urobili: x   Blood: x / Protein: x / Nitrite: x   Leuk Esterase: x / RBC: x / WBC x   Sq Epi: x / Non Sq Epi: x / Bacteria: x            IMAGING/EKG/ETC: reviewed

## 2025-01-24 NOTE — PROGRESS NOTE ADULT - SUBJECTIVE AND OBJECTIVE BOX
HPI:  Unknown age male, unknown past medical history (reported stroke and MI by coworkers) presented to [facility] with AMS, Pt was working at [email or web address] when he was found down by coworkers. EMS called and pt brought to [facility] ED. Intubated, sedated, started on cardene for SBPs in 200s. CT head showed brain stem bleed. Transferred to NSICU for further management.  (30 Sep 2024 12:55)    INTERVAL EVENTS:  continues to have intermittent mouth/face twitching    HOSPITAL COURSE:  9/30: transferred from [facility]. A line placed. Versed dc'd. Cy Rader at bedside, states pt has family in [place], cannot confirm medications or PMH other than stroke and MI. 250cc bolus 3% given. LR switched to NS. hydralazine 25q8 started, 3% started, switched propofol to precedex   10/1: stability CTH done. Added labetalol, started TF. Palliative consulted. ethics consulted to determine surrogate. febrile 103, pan cx sent  10/2: BD 2, GEORGE overnight. TF resumed. Desatt'd to 80s, FiO2 inc. to 50. Fentanyl given, ABG, CXR ordered. Maxxed on precedex, started on propofol for DARIEN -4 - -5. Precedex dc'd. Duonebs, mucomyst, hypertonic added. 3% dc'd. Cardene dc'd. Start vanc/CTX. Increased labetalol 200q8. MRSA negative, dc'd vanc. ETT pulled back 2cm x 2, good positioning after confirmatory chest xray. Ethics attempting to establish HCP with family. Na 159, starting FW 250q6 for range 150-155.   10/3: BD3, GEORGE o/n, neuro stable. Na elevating, FW increased to 300q6. Dc'd bowel reg for diarrhea. vEEG started. SQH 5000q8 tonight.   10/4: BD 4, albumn bolus, incr. LR to 80 2/2 incr. in Cr, LR to 10 0cc/hr for uptrending Cr. Started 7% hypersal for 48hrs and SL atropine for inline/oral thick secretions. Dc'd CTX and started ancef for MSSA in the sputum. Nephrology consulted for CKD, f/u recs. SBP 170s, given hydralazine 10mg IVP.   10/5: BD5, o/n 10mg IVP hydralazine given for SBP 170s and started on hydralazine 25q8 via OGT. 10mg IV push labetalol for SBP > 160s. RT placed for diarrhea.   10/6: BD6, o/n FW increased to 350q4 per nephrology recs. IV tylenol for temp 100.6, SBp 160s presumed uncomfortable.   10/7: BD7, overnight pancultured for temp 101.8F.   10/8: BD8. GEORGE. Cr bumped. decreased LR to 75cc/hr. Adding simethicone ATC. incr hydralazine 50mgTID. Incr labetalol 300mgTID. Na 145, decreased FWF to 250q6. Start precedex. FENa consistent with intrinsic kidney injury. Pend repeat renal US. Retaining up to 1.3L, bladder scans q6, straight cath PRN  10/9: BD 9. GEORGE overnight. Neuro stable. abd xray for distention w non-specific gas pattern, OGT to LIWS for morning. duonebs/mucomyst to q8 for improving secretions. Changed tube feeds to Jevity 1.5 20cc/hr, low rate due to abdominal distention, nepro dense and more difficult to digest. Tolerating CPAP, confirmed by ABG.   10/10: BD 10. GEORGE overnight. Neuro stable. (+) gabriel for urinary retention on bladder scan. inc TF to goal rate of 40cc/hr. family leaning toward pursuing trach/PEG. 1/2 amp for FS 81.   10/11: BD 11. GEORGE overnight. Neuro stable. Trach/PEG consults placed.   10/12: BD 12. GEORGE overnight. Neuro stable. MRI brain complete.   10/13: BD 13. Increase flomax. Hold SQH after PM dose for trach tm. IVL.   10/14: BD 14. GEORGE overnight, remains on AC/VC. Gabriel placed for urinary retention. Dc'd free water.  S/p trach with pulm. NGT placed and CXR confirmed in good position.   10/15: BD 15, GEORGE ovn. resumed feeds. spiked 101, pan cx sent.   10/16: BD 16. GEORGE ovn. Lokelma 5mg for K+ 5.4. Started vanc q 24/zosyn for empiric PNA coverage, IVF to 100/hr. PEG held for fever.   10/17: BD 17,  ordered serum osm and urine osm for am. Started sinemet for neurostimulation. Increased cardura to 0.8. Started FW 100q4, dc'd IVF. MRSA negative, dc'd vanc. NGT replaced d/t coiling.   10/18: BD 18, GEORGE overnight, neuro stable. Amantadine added for neurostim. zosyn changed to unasyn for acinetobacter baumannii, failed TOV and required SC  10/19: BD 19, GEORGE ovn. cardura 2mg added for retention. labetalol decreased 200q8, hydralazine decreased 25q8. Gabriel replaced.   10/20: BD20, GEORGE overnight. NGT dislodged, replaced. PEG tomorrow w/ gen surg, FW increased to 150q4 and labetalol decreased to 100q8, lokelma given for hyperkalemia.   10/21: BD 21. POD0 PEG placement with Gen surg. decr labetolol to 50q8, incr. cardura to 0.4, started lokelma and phoslo, dc gabriel POD0 PEG placement with Gen surg.  10/22: BD 22. Plan to start TF today via PEG. dc labetalol, Following ophtho recs. Increased apnea settings - found to be in cheyne-moe respiration. CPAP 5/5.  10/23: BD 23. hydralazine d/c'd, trach collar trial today. Rectal tube placed at 6am.  10/24: BD24, o/n lokelma held due to diarrhea. Free water 100q6 resumed. dc'd tamsulosin, amantadine. Incr'd cardura to 8mg qhs. Dc'd FW. Switched jevity to nepro. gabriel placed for high urine output. Started SL atropine for oral secretions. Dc'd free water.  10/25: BD25, o/n decreased suctioning requirements to > q4hrs, GEORGE. Cr improving, cont phoslo, lokelma held at this time. Gabriel placed yest, cont. Tolerating trach collar. Given 500cc plasmalyte bolus for ANIKA. Dc'd sinemet.   10/26: BD26, o/n resumed lokelma 5mg daily and resumed 100cc free water q6hrs. Change in neuro status with new right pupillary dilation with anisocoria (right pupil 6mm fixed and left pupil 3mm briskly reactive). Given 23.4% NaCl bullet, taken for emergent CTH showing mostly resolved pontine hemorrhage, continued brainstem hypodensity likely edema d/t hemorrhage, no new hemorrhage or infarct, no herniation, mild increase in size of left lateral ventricle. Vitals remaine stable. Na goal > 140.   10/27: BD27, o/n GEORGE.Neuro stable. Pend stepdown with airway bed.   10/28: BD 28. GEORGE overnight. Neuro stable. Miralax ordered. Gabriel removed, pending TOV.  10/29: BD 29. GEORGE o/n. Given 2L NS over 8 hrs for increased BUN/Cr ratio. Gabriel placed for frequent straight cath.   10/30: BD 30.   10/31: BD 31. GEORGE overnight. Na 149, increased free water to 200q6. 1L NS for uptrending BUN.   11/1: BD 32. GEORGE overnight. Given 1L NS for dehydration. Na 146, increased FW to 250q6.   11/2: BD 33 GEORGE overnight, neuro stable, given 500cc bolus for net negative status and tachycardia   11/3: BD 34, GEORGE overnight, neuro stable. Patient remains tachycardic, EKG showing sinus tachycardia, given additional 500cc NS bolus. Febrile to 101.9F, pan cultured (without UA), CXR WNL, given tylenol.   11/4: BD 35, GEORGE overnight, neuro stable. Given 1L NS for tachycardia. sputum (+) for stenotropohomas maltophilia.   11/5: BD 36 GEORGE overnight, neuro stable. Vancomycin dc'd. Chest PT BID. ID consulted, cont zosyn.  11/6: BD 37. blood cx + klebsiella dc zosyn changed to cefepime, CTAP ordered, rpt blood cx sent.    11/7: BD 38. Pending CT A/P, given 250cc bolus and starting maintenance fluids overnight. Pending CT A/P after bolus   11/8: BD 39. CT CAP negative for infection.   11/9: BD 40. GEORGE overnight.  11/10: BD 41. GEORGE overnight. desat to 85 on trach collar, O2 inc to 10L and 100%, O2 sat inc to 95. pt tachy to 110s, euvolemic. given tylenol. ABG and CXR ordered. spiked fever, pancultured, RVP negative. AM ABG w pO2 79, rpt w pO2 79. pt appears comfortable, satting 94%.   11/11: BD 42. GEORGE overnight. pt became tachy to 130s, desat to 90 on 100% FiO2 and 10L. suctioned, (+) productive cough. temp 101.4, given 1g IV tylenol and 500cc NS bolus for euvolemia. fever and HR downtrending. LE dopplers negative for dvt  11/12: BD 43, GEORGE ovn, fever and HR downtrending, satting 97% 70% FIO2  11/13: BD 44, GEORGE ovn. started standing tylenol x24 hours for tachycardia. desat to 80s, o2 increased. CXR stable, pending CTA PE protocol.   11/14: BD 45, GEORGE overnight, neuro stable. resp therapy dec FiO2 to 70%.   11/15: BD 46, GEORGE overnight, neuro stable.  Rapid called for desaturation 30s, tachycardic 140s. Patient bagged, 100% fio2, heavily suctioned. CXR/POCUS unremarkable. ABG c/w desaturation. WBC 14.71. Afebrile. O2 improved to 90s and patient upgraded to ICU. ABG paO2 30s improved to 89 on vent. IV Tylenol x 1, sputum sent. Start protonix while o-n vent.   11/16: BD 47. POCUS showed collapsable IVCF, given 1L bolus. Vanco/Cefpime added empriic for PNA, NGT feeds restarted. MRSA swab neg, Vanc DC'd.   11/17: BD 48. GEORGE overnight. 1l bolus for tachycardia. Spiked to 101, cultured. 500cc bolus for tachycardia, tachy to 148 given 25mcg fentanyl, 250cc albumin, 1.5L bolus. 5 IV lopressor with response HR to 100s. +Stenotrophomonas on sputum cx.   11/18: BD 49. GEORGE overnight. Consulted ID, cefepime switched to bactrim x7days. Started hydrochlorothiazine 12.5mg daily.  11/19: BD 50. Tachy 120s, given tylenol and 500cc NS. Tolerating 5/5, switched to TCx. Holding phos binder. D/c Bactrim. D/c gabriel, f/u TOV. Dc'd PPI.   11/20: BD 51. GEORGE ovn. 1600 satting low 90s, mildly tachy to 110s, afebrile, RR wnl. O2 improved to mid 90s while inc O2 to 100% on TCx. CPAP 5/5 placed back on.  11/21: BD 52, GEORGE ovn, tolerating CPAP 5/5. Switch to trach collar during the day if tolerating well. HCTZ held for Cr bump, straight cath frequence increased to q4  11/22: BD 53, GEORGE ovn. Resumed phoslo. Gabriel placed. Resumed HCTZ.   11/23: BD 54. Holding tylenol in setting of possible fever, will require pan cx if febrile. Cr improved today. Cont CPAP. Bowel regimen held i/s/o diarrhea. FOBT negative.  11/24: BD 55. GEORGE overnight. Neuro stable. HCTZ dc'ed, started lisinopril 5. Lokelma dc'ed for K 3.7.   11/25: BD 56, GEORGE overnight. Neuro stable. dc'd lisinopril 5mg. Gabriel dc'd. TOV. 1545 noted to be hypotensive, MAP 50, in supine position on chair, HR 60s, afebrile, O2 96%. Given 1L cc NS bolus, placed back on bed in reverse trendelenberg, improved to map of 66. Neostick at bedside. Vitals check q1h. Dc'ed amlodipine. Failed TOV, bladder scan q6, sc prn. Added back Senna.   11/26: BD 57, GEORGE overnight. Neuro stable. Dc'd phoslo.   11/27: BD 58, GEORGE overnight. Neuro stable.    11/28: BD 59. Gabriel replaced.   11/29: GEORGE.  11/30: GEORGE, neuro stable.   12/1: BD 62, GEORGE overnight  12/2: BD 63, GEORGE overnight.; Given 1L bolus of LR for uptrending BUN/Cr.  12/3: BD 64. Reinstated eye gtt/moisture chamber given increased Rt eye injection  12/4: BD 65. GEORGE overnight. Attempted to speak with ophtho regarding eyelid weight/closure but no answer, full mailbox.   12/5: BD 66, GEORGE overnight, bowel regimen increased and had BM.   12/6: BD 67, GEORGE overnight, neuro stable.  12/7: GEORGE overnight, neuro stable. /110s, given x1 hydralazine 10 mg IVP. Restarted home amlodipine 5mg.  12/8: GEORGE. OOB to chair.     12/11: GEORGE, mucomyst added for thick secretions, simethicone for abd distension, abd xray with stool burden, increased bowel regimen.   12/12: GEORGE overnight.   12/13: GEORGE overnight.   12/14: GEORGE overnight  12/15: o/n Patient became tachycardic HR 120s and 10 minutes later O2 sat dropped as low as 89%. Patient suctioned without improvement in O2 sat and tube feeds found in suction catheter. TFs held.  STAT CXR ordered. STAT labs sent. Respiratory therapist called to bedside and patient trach connected to ventilator. After connection to ventilator and further suctioning O2 sat improved to 97% but patient HR remains 120-130s. Upgraded to NSICU for further management. Vancomycin and zosyn started. CTA  chest PE protocol and CTH ordered. Blood cultures sent.given 500cc bolus, rpt ABG sent pO2 243, CTH and CTA chest done. FS while NPO, FiO2 dec 50 pending ABG. sputum cx positive for few GPC and GNR.   12/16: GEORGE overnight, restarted amlodipine, troponin 75   12/17: GEORGE overnight, neuro stable. Attempted CPAP this morning, did not tolerate and back on full vent support. Dc'd Vanc. Tachycardia to 140s, noted extremities to be twitching along with jaw twitching. Given 2g of Keppra, total 4mg ativan and placed on EEG, full set of labs and lactate negative. Resumed trickle feeds at 20cc/hr. Given 250cc albumin for tachycardia.   12/18: GEORGE overnight. Neuro stable. CTH stable. EEG negative and dc'd.   12/19: GEORGE overnight. neuro stable. SIMV most of day, AC/VC at night.   12/20: GEORGE overnight, neuro stable. remains on VC/AC  12/21: GEORGE ovn. 1L bolus for tachy to 120s-130s.   12/22: GEORGE ovn. Tachy to 120s, given tylenol and IVF. 2mg IV ativan given for L jaw twitching. Sx resolved for 3 minutes and started again. Epilepsy contacted. Given 2mg IV ativan. Continued twitching. Increased keppra to 1500mg BID, 2mg ativan given. Propranolol started for refractory tachycardia. vEEG ordered. albumin given. POCUS performed, no b lines. Started on fosphenytoin, loaded w 20mg/kg then 100mg TID. febrile, pancx, started cefepime 2g q8, vancomycin  12/23: GEORGE ovn. +focal motor seizures on eeg. ID consulted. Vancomycin dc'ed as per ID.  12/24: GEORGE ovn, neuro stable. Baclofen 5 mg q12 started for hiccups. Na 129 from 134. Urine lytes c/w SIADH. Given 250cc 3% bolus. CT chest for infection w/u - f/u read. Repeat Na 136. UA negative  12/25: GEORGE ovn.   12/26: GEORGE ovn  12/27: GEORGE overnight. Blood cx neg @ 4 days, DC cefepime per medicine (sputum colonized).   12/28: Desaturation o/n to 80's, CXR obtained, pulse ox changed and sats resolved. Na 133 from 138, 250cc 3% bolus given. Cefepime resumed until 12/30 per ID. Rpt Na 138.  12/29: GEORGE overnight. Na stable.   12/30: GEORGE overnight. Family meeting with son, pt now DNR.   12/31: GEORGE overnight.   1/1: GEORGE overnight, neuro stable.  1/2: GEORGE overnight, neuro stable. FW decreased to 100q6.   1/3: GEORGE overnight, neuro stable. fosphenytoin IV changed to pheytoin PO via PEG per epilepsy recommendations  1/4: GEORGE overnight, neuro stable. FW incr. to 100q4  1/5: GEORGE overnight, neuro stable.   1/6: GEORGE overnight, neuro stable   1/7: GEORGE overnight, neuro stable. BUN/Cr increased, increased FW to 200q6. Given 1L bolus of LR over 2 hours. Gabriel d/c'd, voiding, continue bladder scan q6.   1/8: GEORGE overnight, neuro stable. BUN/Cr improving.  1/9: GEORGE overnight, neuro stable.   1/10: GEORGE overnight, neuro stable.   1/11: GEORGE overnight, neuro stable, vent settings stable.   1/12: GEORGE overnight, neuro stable. Febrile to 102F, f/u pan cx, chest xray, given tylenol. Zosyn started.   1/13: GEORGE overnight, neuro stable, cont Zosyn for presumed PNA, prelim sputum cx growing; few GS neg diplococci, mod GS neg rods, rare GS + rods, fever trend improved. Free water increased to [id number].   1/14: GEORGE overnight. pending renal US for elevated Cr  1/15: GEORGE ovn. renal US complete.   1/16: GEORGE overnight, neuro stable   1/17: GEORGE overnight, neuro stable   1/18: GEORGE overnight, neuro stable.   1/19: GEORGE overnight, neuro stable. Propranolol dec to 5q8.   1/20: GEORGE overnight, neuro stable. Weaned propranolol to 5mg BID.   1/21: GEORGE ovn, in AM having rapid vertical eye movements with facial twitching, 2g total keppra given for AM dose and phenytoin level ordered, vital signs stable. CTH stable. Phenytoin increased to 100/100/150mg per epilepsy  1/22: GEORGE ovn. IV hydral x 1 for SBP 170s.   1/23: Cont to have focal motor seizures. Per epilepsy, changed phenytoin dose to 100/100/200.   1/24: Phenytoin lvl tmrw AM      Vital Signs Last 24 Hrs  T(C): 36.9 (23 Jan 2025 21:18), Max: 37 (23 Jan 2025 08:58)  T(F): 98.4 (23 Jan 2025 21:18), Max: 98.6 (23 Jan 2025 08:58)  HR: 88 (23 Jan 2025 20:24) (84 - 102)  BP: 135/92 (23 Jan 2025 20:05) (114/84 - 140/95)  BP(mean): 110 (23 Jan 2025 20:05) (95 - 113)  RR: 18 (23 Jan 2025 20:24) (15 - 18)  SpO2: 100% (23 Jan 2025 20:24) (95% - 100%)    Parameters below as of 23 Jan 2025 21:00  Patient On (Oxygen Delivery Method): ventilator        I&O's Summary    22 Jan 2025 07:01  -  23 Jan 2025 07:00  --------------------------------------------------------  IN: 1480 mL / OUT: 1200 mL / NET: 280 mL    23 Jan 2025 07:01  -  24 Jan 2025 00:16  --------------------------------------------------------  IN: 1140 mL / OUT: 0 mL / NET: 1140 mL        PHYSICAL EXAM:  Constitutional:  NAD  ENT: PERRL, tracks vertically  Respiratory: +trach, breathing non-labored, symmetrical chest wall movement, lungs CTA b/l  Cardiovascuar: RRR, no murmurs  Gastrointestinal: +PEG, abdomen soft, non tender  Neurological:  AAOX2-3 with squeezing R hand communication. nonverbal, intermittently follows commands   Motor: RUE handgrip to command 2/5, RLE w/d, LUE 0/5, LLE withdraws to noxious   Pronator Drift: unable to assess     LABS:                        10.7   7.86  )-----------( 173      ( 23 Jan 2025 05:30 )             33.8     01-23    138  |  101  |  37[H]  ----------------------------<  121[H]  3.6   |  27  |  0.96    Ca    9.2      23 Jan 2025 05:30  Phos  3.0     01-23  Mg     2.0     01-23    TPro  6.9  /  Alb  3.2[L]  /  TBili  <0.2  /  DBili  0.1  /  AST  15  /  ALT  38  /  AlkPhos  178[H]  01-23      Urinalysis Basic - ( 23 Jan 2025 05:30 )    Color: x / Appearance: x / SG: x / pH: x  Gluc: 121 mg/dL / Ketone: x  / Bili: x / Urobili: x   Blood: x / Protein: x / Nitrite: x   Leuk Esterase: x / RBC: x / WBC x   Sq Epi: x / Non Sq Epi: x / Bacteria: x          CAPILLARY BLOOD GLUCOSE          Drug Levels: [] N/A  Phenytoin Level, Serum: 2.9 ug/mL (01-23 @ 05:30)  Phenytoin Level, Serum: 3.3 ug/mL (01-21 @ 07:59)    CSF Analysis: [] N/A      Allergies    Allergy Status Unknown    Intolerances      MEDICATIONS:  Antibiotics:    Neuro:  acetaminophen   Oral Liquid .. 650 milliGRAM(s) Oral every 6 hours PRN  baclofen 5 milliGRAM(s) Oral every 12 hours  levETIRAcetam 1500 milliGRAM(s) Oral two times a day  phenytoin   Suspension 100 milliGRAM(s) Oral <User Schedule>  phenytoin   Suspension 200 milliGRAM(s) Oral <User Schedule>    Anticoagulation:  heparin   Injectable 5000 Unit(s) SubCutaneous every 8 hours    OTHER:  acetylcysteine 10%  Inhalation 4 milliLiter(s) Inhalation every 6 hours  albuterol/ipratropium for Nebulization 3 milliLiter(s) Nebulizer every 6 hours  amLODIPine   Tablet 5 milliGRAM(s) Oral daily  artificial tears (preservative free) Ophthalmic Solution 1 Drop(s) Right EYE every 4 hours  chlorhexidine 0.12% Liquid 15 milliLiter(s) Oral Mucosa every 12 hours  chlorhexidine 2% Cloths 1 Application(s) Topical <User Schedule>  doxazosin 8 milliGRAM(s) Oral at bedtime  erythromycin   Ointment 1 Application(s) Right EYE at bedtime  fluticasone propionate 50 MICROgram(s)/spray Nasal Spray 1 Spray(s) Both Nostrils two times a day  influenza   Vaccine 0.5 milliLiter(s) IntraMuscular once  ofloxacin 0.3% Solution 1 Drop(s) Right EYE four times a day  pantoprazole  Injectable 40 milliGRAM(s) IV Push daily  petrolatum Ophthalmic Ointment 1 Application(s) Both EYES two times a day  propranolol 5 milliGRAM(s) Oral every 12 hours  sodium chloride 0.65% Nasal 1 Spray(s) Both Nostrils two times a day    IVF:    CULTURES:  Culture Results:   >=3 organisms. Probable collection contamination. (01-12 @ 15:25)  Culture Results:   No growth at 5 days (01-12 @ 12:50)    RADIOLOGY & ADDITIONAL TESTS:      ASSESSMENT:  44M PMH ?stroke/MI present to [facility] after collapsing at work. Decorticate posturing, vomiting, intubated for airway protection. 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 Transferred to [facility] for further management. s/p trach 10/14. s/p peg 10/21. Re-upgrade to ICU 2/2 desaturation event and suctioning requirements 11/15. Re-upgrade to NSICU 12/15 2/2 desaturation and tachycardia.    Neuro:  - neuro/vitals q4h  - pain control: tylenol prn  - seizure tx: keppra 1500mg BID, phenytoin 100/100/200  - vEEG (10/3-4) negative, (10/17-10/19) negative, (12/17-12/18) negative, (12/22-12/25) +focal motor seizures not correlating w/ EEG  - CTH 9/30: enlarged pontine hemorrhage, CTH 10/3: stable, CTH 10/25: mostly resolved pontine hemorrhage, CTH 12/15: R mastoid air cell opacification; acute otitis media vs sterile effusion, CTH 12/18: stable. CTH 1/21 stable.  - MRI brain 10/12: parenchymal hemorrhage, acute/subacute R cerebellar stroke      CV:  - -160  - tachycardia: propranolol 5mg BID   - HTN: amlodipine 5mg  - echo (9/30) EF 75%, repeat 12/16: 57%    Resp:  - trach to vent, AC/VC 40/400/14/6  - Secretions: duonebs/mucomyst/chest PT q6h     GI:  - TF via PEG (placed 10/21 by gen surg)  - bowel regimen held for loose stool, last BM 1/21  - baclofen 5q12 for hiccups    Renal:  - IVL, FW 200q4 for hydration   - Urinary retenion: Gabriel removed 1/7, Voiding  - CKD: trend BUN/Cr  - renal US 10/1, 10/8, 1/15: Increased bilateral renal echogenicity consistent with medical renal disease    Endo:  - A1c 5.4    Heme:  - DVT ppx: SCDs, SQH 5000u q8h   - LE dopplers negative 12/16    ID:  - last pan cx 1/12   - empiric PNA: cefepime (12/22-12/30), s/p vanc (12/22-12/23), s/p zosyn (12/15-12/20), s/p vanc (12/15-12/16), s/p zosyn (1/12 -1/18)  - s/p Stenotrophomonas maltophilia PNA: s/p Bactrim (11/18-11/19) s/p Cefepime (11/16-11/18)  - 11/3, (+) sputum for stenotrophomas maltophlia, blood cx (+) klebsiella, cefepime 2gq12 (11/6 - 11/12)   - empiric tx: s/p zosyn (11/3-11/6) , s/p vanc  (11/3-11/5)  - S/p Ancef (10/4-10/14) for PNA, and s/p Unasyn (10/18-10/23) +actinobacter baumanii     MISC:  - ophtho consult for keratitis  - erythromycin ointment R eye q4hrs, ofloxacin ointment R eye QID, artificial tears R eye q2hrs, moisture chamber at bedtime    Dispo: SDU status, DNR, pending PRUCOL for benefits     D/w Dr. D'Amico

## 2025-01-24 NOTE — PROGRESS NOTE ADULT - ASSESSMENT
44M with unclear PMH (?stroke, MI) who was found down at work, intubated for airway protection and found to have acute parenchymal hemorrhage within nabil with mass effect (+ acute/subacute right cerebellar infarct) in setting of hypertensive emergency, transferred to [facility] for further neurosurgical care. Hospital course c/b poor neurologic recovery s/p trach-PEG, AUR s/p gabriel, ANIKA on CKD c/b hyperkalemia, HAP s/p amp-sulbactam (EOT 10/23), K aerogenes bacteremia  treated with 2 weeks of Cefepime per ID , On 11/15 he became septic and was transferred to NSICU due to increased o2 requirements and needed Vent support , Trach Cultures + for Stenotrophomonas which was treated with 7 days of Bactrim per ID , has been weaned of Vent since 11/23 and is now on 10lts at 40% o2 through Trach Collar. Stepped up to the NSICU on 12/15 for hypoxia and tachycardia. PE ruled out. On abx for 5 days. Unclear etiology. Now stepped down to 8Lach.    # Pontine hemorrhage  # Encephalopathy due to Intracranial Bleed   - Acute parenchymal hemorrhage within nabil with mass effect (+ acute/subacute right cerebellar infarct) in setting of hypertensive emergency.   - MRI brain also demonstrated acute/subacute R cerebellar stroke.   - No Neurosurgical Interventions were performed   - Secondary to IPH and cerebellar stroke patient has become Trach and PEG  Dependent    #Fever   -New low grade temp, tachycardia and slightly rising white count  -Would do CXR as VAP is highest likelihood at this time  -Will watch off antibiotics until source found or real fever develops    #Hyponatremia   -Send urine lytes  -Could be related to lung pathology    #Acute kidney injury (RESOLVED)   # Acute Tubular Necrosis superimposed on CKD stage III  - Pt s/p US renal with chronic medical renal disease  -  Continue Free water to 200 q4h. Will continue to monitor creatinine  -Monitor UOP, bladder scan per protocol.     # Seizures  - Continue Seizure precautions   - Continue  Keppra and Fosphenytoin     # Hypertension  -Pt with elevated BP overnight   - Continue amlodipine 5mg daily with holding parameters  - Will continue to monitor BP and uptitrate antihypertensive as required     #Bradycardia  -Pt's propranolol was decreased on 1/19 from 10mg PO q8 to 5mg PO q8 with holding parameters     # Chronic respiratory failure.   - S/p percutaneous trach by pulm on 10/14/24  - Currently on Vent Support   - Continue Chest PT    # Neurogenic dysphagia.   - Pt s/p PEG with surgery 10/21/24  - TF per nutrition  - aspiration precautions and elevation of HOB.    #Acute urinary retention.   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

## 2025-01-24 NOTE — PROGRESS NOTE ADULT - ASSESSMENT
44 year-old-male with unclear PMH (?stroke, MI) who was found down at work, intubated for airway protection and found to have acute large parenchymal hemorrhage within nabil with mass effect (+ acute/subacute right cerebellar infarct) in setting of hypertensive emergency, and R cerebellar stroke transferred to [facility] for further neurosurgical care. 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 Epilepsy recalled as pt with vertical eye movements that appeared rhythmic and resolved after given AM Keppra +additional 500mg. Dilantin level 3.3. High suspicion for continued focal seizures, however given location of the bleed, suspect that repeating EEG will be limited utility. Unclear if EEG negative focal seizures or hemifacial spasm from pontine irritation Given low therapeutic level, will recommend increasing Dilantin and monitoring clinically for progression as will treat both.    Recommendations:  - Continue Keppra 1500mg Q12hrs  - Continue Phenytoin to 100/100/200mg.   - Recheck phenytoin level 1/25 AM  - Maintain seizure/fall/aspiration precautions    Discussed with Dr. Farley.

## 2025-01-24 NOTE — PROGRESS NOTE ADULT - SUBJECTIVE AND OBJECTIVE BOX
Neurology Progress Note    Interval History:  The patient was seen and examined at the bedside. Less interactive, warm to touch, and slightly diaphoretic on examination today.       PAST MEDICAL & SURGICAL HISTORY:  Acute myocardial infarction    CVA (cerebral vascular accident)            Medications:  acetaminophen   Oral Liquid .. 650 milliGRAM(s) Oral every 6 hours PRN  acetylcysteine 10%  Inhalation 4 milliLiter(s) Inhalation every 6 hours  albuterol/ipratropium for Nebulization 3 milliLiter(s) Nebulizer every 6 hours  amLODIPine   Tablet 5 milliGRAM(s) Oral daily  artificial tears (preservative free) Ophthalmic Solution 1 Drop(s) Right EYE every 4 hours  baclofen 5 milliGRAM(s) Oral every 12 hours  chlorhexidine 0.12% Liquid 15 milliLiter(s) Oral Mucosa every 12 hours  chlorhexidine 2% Cloths 1 Application(s) Topical <User Schedule>  doxazosin 8 milliGRAM(s) Oral at bedtime  erythromycin   Ointment 1 Application(s) Right EYE at bedtime  fluticasone propionate 50 MICROgram(s)/spray Nasal Spray 1 Spray(s) Both Nostrils two times a day  heparin   Injectable 5000 Unit(s) SubCutaneous every 8 hours  influenza   Vaccine 0.5 milliLiter(s) IntraMuscular once  levETIRAcetam 1500 milliGRAM(s) Oral two times a day  ofloxacin 0.3% Solution 1 Drop(s) Right EYE four times a day  pantoprazole  Injectable 40 milliGRAM(s) IV Push daily  petrolatum Ophthalmic Ointment 1 Application(s) Both EYES two times a day  phenytoin   Suspension 100 milliGRAM(s) Oral <User Schedule>  phenytoin   Suspension 200 milliGRAM(s) Oral <User Schedule>  propranolol 5 milliGRAM(s) Oral every 12 hours  sodium chloride 0.65% Nasal 1 Spray(s) Both Nostrils two times a day      Vital Signs Last 24 Hrs  T(C): 36.9 (24 Jan 2025 17:34), Max: 37.9 (24 Jan 2025 04:34)  T(F): 98.5 (24 Jan 2025 17:34), Max: 100.2 (24 Jan 2025 04:34)  HR: 78 (24 Jan 2025 16:40) (78 - 104)  BP: 129/88 (24 Jan 2025 16:26) (111/67 - 138/92)  BP(mean): 104 (24 Jan 2025 16:26) (84 - 110)  RR: 18 (24 Jan 2025 16:40) (16 - 19)  SpO2: 99% (24 Jan 2025 16:40) (98% - 100%)    Parameters below as of 24 Jan 2025 16:40  Patient On (Oxygen Delivery Method): ventilator    O2 Concentration (%): 40      Neurological Exam:   Mental status: Awake, occasionally briefly tracking interviewer. On asking his name with choices appeared to look up, but difficult to ascertain given his facial twitching. Intermittently follows commands to close and open eyes.  +Blink to threat on L eye only. left eyelid and lip be twitching. Intermittent rapid vertical eye movements. DTRs 3+ throughout.       Labs:  CBC Full  -  ( 24 Jan 2025 06:56 )  WBC Count : 10.36 K/uL  RBC Count : 3.57 M/uL  Hemoglobin : 10.7 g/dL  Hematocrit : 34.4 %  Platelet Count - Automated : 164 K/uL  Mean Cell Volume : 96.4 fl  Mean Cell Hemoglobin : 30.0 pg  Mean Cell Hemoglobin Concentration : 31.1 g/dL  Auto Neutrophil # : x  Auto Lymphocyte # : x  Auto Monocyte # : x  Auto Eosinophil # : x  Auto Basophil # : x  Auto Neutrophil % : x  Auto Lymphocyte % : x  Auto Monocyte % : x  Auto Eosinophil % : x  Auto Basophil % : x    01-24    134[L]  |  100  |  38[H]  ----------------------------<  104[H]  4.0   |  23  |  0.95    Ca    9.0      24 Jan 2025 06:56  Phos  2.9     01-24  Mg     2.0     01-24    TPro  6.9  /  Alb  3.2[L]  /  TBili  <0.2  /  DBili  0.1  /  AST  15  /  ALT  38  /  AlkPhos  178[H]  01-23    LIVER FUNCTIONS - ( 23 Jan 2025 05:30 )  Alb: 3.2 g/dL / Pro: 6.9 g/dL / ALK PHOS: 178 U/L / ALT: 38 U/L / AST: 15 U/L / GGT: x             Urinalysis Basic - ( 24 Jan 2025 06:56 )    Color: x / Appearance: x / SG: x / pH: x  Gluc: 104 mg/dL / Ketone: x  / Bili: x / Urobili: x   Blood: x / Protein: x / Nitrite: x   Leuk Esterase: x / RBC: x / WBC x   Sq Epi: x / Non Sq Epi: x / Bacteria: x        Assessment:  This is a 44y Male w/ h/o     Plan:

## 2025-01-25 LAB
ADD ON TEST-SPECIMEN IN LAB: SIGNIFICANT CHANGE UP
ANION GAP SERPL CALC-SCNC: 11 MMOL/L — SIGNIFICANT CHANGE UP (ref 5–17)
BUN SERPL-MCNC: 39 MG/DL — HIGH (ref 7–23)
CALCIUM SERPL-MCNC: 9.3 MG/DL — SIGNIFICANT CHANGE UP (ref 8.4–10.5)
CHLORIDE SERPL-SCNC: 102 MMOL/L — SIGNIFICANT CHANGE UP (ref 96–108)
CO2 SERPL-SCNC: 26 MMOL/L — SIGNIFICANT CHANGE UP (ref 22–31)
CREAT SERPL-MCNC: 1.07 MG/DL — SIGNIFICANT CHANGE UP (ref 0.5–1.3)
EGFR: 87 ML/MIN/1.73M2 — SIGNIFICANT CHANGE UP
EGFR: 87 ML/MIN/1.73M2 — SIGNIFICANT CHANGE UP
GLUCOSE SERPL-MCNC: 105 MG/DL — HIGH (ref 70–99)
HCT VFR BLD CALC: 30.3 % — LOW (ref 39–50)
HGB BLD-MCNC: 9.5 G/DL — LOW (ref 13–17)
MAGNESIUM SERPL-MCNC: 2.1 MG/DL — SIGNIFICANT CHANGE UP (ref 1.6–2.6)
MCHC RBC-ENTMCNC: 30 PG — SIGNIFICANT CHANGE UP (ref 27–34)
MCHC RBC-ENTMCNC: 31.4 G/DL — LOW (ref 32–36)
MCV RBC AUTO: 95.6 FL — SIGNIFICANT CHANGE UP (ref 80–100)
NRBC # BLD: 0 /100 WBCS — SIGNIFICANT CHANGE UP (ref 0–0)
NRBC BLD-RTO: 0 /100 WBCS — SIGNIFICANT CHANGE UP (ref 0–0)
PHENYTOIN FREE SERPL-MCNC: 4.6 UG/ML — LOW (ref 10–20)
PHOSPHATE SERPL-MCNC: 4.4 MG/DL — SIGNIFICANT CHANGE UP (ref 2.5–4.5)
PLATELET # BLD AUTO: 177 K/UL — SIGNIFICANT CHANGE UP (ref 150–400)
POTASSIUM SERPL-MCNC: 3.6 MMOL/L — SIGNIFICANT CHANGE UP (ref 3.5–5.3)
POTASSIUM SERPL-SCNC: 3.6 MMOL/L — SIGNIFICANT CHANGE UP (ref 3.5–5.3)
RBC # BLD: 3.17 M/UL — LOW (ref 4.2–5.8)
RBC # FLD: 13.9 % — SIGNIFICANT CHANGE UP (ref 10.3–14.5)
SODIUM SERPL-SCNC: 139 MMOL/L — SIGNIFICANT CHANGE UP (ref 135–145)
WBC # BLD: 8.2 K/UL — SIGNIFICANT CHANGE UP (ref 3.8–10.5)
WBC # FLD AUTO: 8.2 K/UL — SIGNIFICANT CHANGE UP (ref 3.8–10.5)

## 2025-01-25 PROCEDURE — 99232 SBSQ HOSP IP/OBS MODERATE 35: CPT

## 2025-01-25 PROCEDURE — 99233 SBSQ HOSP IP/OBS HIGH 50: CPT

## 2025-01-25 RX ORDER — PHENYTOIN 100 MG/4ML
125 SUSPENSION, ORAL (FINAL DOSE FORM) ORAL
Refills: 0 | Status: DISCONTINUED | OUTPATIENT
Start: 2025-01-25 | End: 2025-01-26

## 2025-01-25 RX ADMIN — BACLOFEN 5 MILLIGRAM(S): 10 INJECTION INTRATHECAL at 18:45

## 2025-01-25 RX ADMIN — FLUTICASONE PROPIONATE 1 SPRAY(S): 50 SPRAY, METERED NASAL at 05:16

## 2025-01-25 RX ADMIN — LEVETIRACETAM 1500 MILLIGRAM(S): 10 INJECTION, SOLUTION INTRAVENOUS at 18:44

## 2025-01-25 RX ADMIN — BACLOFEN 5 MILLIGRAM(S): 10 INJECTION INTRATHECAL at 05:13

## 2025-01-25 RX ADMIN — Medication 1 DROP(S): at 09:38

## 2025-01-25 RX ADMIN — IPRATROPIUM BROMIDE AND ALBUTEROL SULFATE 3 MILLILITER(S): .5; 2.5 SOLUTION RESPIRATORY (INHALATION) at 05:15

## 2025-01-25 RX ADMIN — Medication 40 MILLIGRAM(S): at 12:19

## 2025-01-25 RX ADMIN — OFLOXACIN 1 DROP(S): 3 SOLUTION OPHTHALMIC at 12:20

## 2025-01-25 RX ADMIN — ACETYLCYSTEINE 4 MILLILITER(S): 200 INHALANT RESPIRATORY (INHALATION) at 05:15

## 2025-01-25 RX ADMIN — Medication 40 MILLIEQUIVALENT(S): at 09:26

## 2025-01-25 RX ADMIN — Medication 1 SPRAY(S): at 18:43

## 2025-01-25 RX ADMIN — Medication 1 APPLICATION(S): at 05:16

## 2025-01-25 RX ADMIN — Medication 1 APPLICATION(S): at 05:17

## 2025-01-25 RX ADMIN — IPRATROPIUM BROMIDE AND ALBUTEROL SULFATE 3 MILLILITER(S): .5; 2.5 SOLUTION RESPIRATORY (INHALATION) at 18:41

## 2025-01-25 RX ADMIN — Medication 100 MILLIGRAM(S): at 12:20

## 2025-01-25 RX ADMIN — IPRATROPIUM BROMIDE AND ALBUTEROL SULFATE 3 MILLILITER(S): .5; 2.5 SOLUTION RESPIRATORY (INHALATION) at 12:19

## 2025-01-25 RX ADMIN — Medication 1 APPLICATION(S): at 18:43

## 2025-01-25 RX ADMIN — Medication 1 DROP(S): at 18:41

## 2025-01-25 RX ADMIN — Medication 1 DROP(S): at 01:00

## 2025-01-25 RX ADMIN — AMLODIPINE BESYLATE 5 MILLIGRAM(S): 10 TABLET ORAL at 05:13

## 2025-01-25 RX ADMIN — IPRATROPIUM BROMIDE AND ALBUTEROL SULFATE 3 MILLILITER(S): .5; 2.5 SOLUTION RESPIRATORY (INHALATION) at 23:46

## 2025-01-25 RX ADMIN — LEVETIRACETAM 1500 MILLIGRAM(S): 10 INJECTION, SOLUTION INTRAVENOUS at 05:14

## 2025-01-25 RX ADMIN — ACETYLCYSTEINE 4 MILLILITER(S): 200 INHALANT RESPIRATORY (INHALATION) at 12:19

## 2025-01-25 RX ADMIN — Medication 100 MILLIGRAM(S): at 03:58

## 2025-01-25 RX ADMIN — ACETYLCYSTEINE 4 MILLILITER(S): 200 INHALANT RESPIRATORY (INHALATION) at 23:46

## 2025-01-25 RX ADMIN — HEPARIN SODIUM 5000 UNIT(S): 1000 INJECTION INTRAVENOUS; SUBCUTANEOUS at 14:28

## 2025-01-25 RX ADMIN — Medication 1 DROP(S): at 14:29

## 2025-01-25 RX ADMIN — DOXAZOSIN MESYLATE 8 MILLIGRAM(S): 8 TABLET ORAL at 22:39

## 2025-01-25 RX ADMIN — Medication 15 MILLILITER(S): at 18:43

## 2025-01-25 RX ADMIN — ACETYLCYSTEINE 4 MILLILITER(S): 200 INHALANT RESPIRATORY (INHALATION) at 18:41

## 2025-01-25 RX ADMIN — HEPARIN SODIUM 5000 UNIT(S): 1000 INJECTION INTRAVENOUS; SUBCUTANEOUS at 22:38

## 2025-01-25 RX ADMIN — Medication 1 DROP(S): at 05:18

## 2025-01-25 RX ADMIN — OFLOXACIN 1 DROP(S): 3 SOLUTION OPHTHALMIC at 23:46

## 2025-01-25 RX ADMIN — Medication 200 MILLIGRAM(S): at 19:37

## 2025-01-25 RX ADMIN — HEPARIN SODIUM 5000 UNIT(S): 1000 INJECTION INTRAVENOUS; SUBCUTANEOUS at 05:15

## 2025-01-25 RX ADMIN — ERYTHROMYCIN 1 APPLICATION(S): 5 OINTMENT OPHTHALMIC at 22:39

## 2025-01-25 RX ADMIN — OFLOXACIN 1 DROP(S): 3 SOLUTION OPHTHALMIC at 05:18

## 2025-01-25 RX ADMIN — FLUTICASONE PROPIONATE 1 SPRAY(S): 50 SPRAY, METERED NASAL at 18:44

## 2025-01-25 RX ADMIN — Medication 1 SPRAY(S): at 05:16

## 2025-01-25 RX ADMIN — OFLOXACIN 1 DROP(S): 3 SOLUTION OPHTHALMIC at 18:41

## 2025-01-25 RX ADMIN — Medication 1 DROP(S): at 22:38

## 2025-01-25 RX ADMIN — Medication 15 MILLILITER(S): at 05:16

## 2025-01-25 NOTE — PROGRESS NOTE ADULT - SUBJECTIVE AND OBJECTIVE BOX
Congestive Heart Failure  Patient presents for re-evaluation of congestive heart failure. Patient's current complaints are none. He denies chest pain, fatigue, and palpitations. He states he is compliant all of the time with his medications. He states he is compliant most of the time with his diet. Atrial Fibrillation: Patient denies palpitations at this time. Patient states he does check his blood pressure at home, and it has been running good at home most of the time. Allergies:  Amoxicillin-pot clavulanate, Ciprofloxacin, and Amoxicillin    Past Medical History:    Past Medical History:   Diagnosis Date    Asthma     BPH (benign prostatic hyperplasia)     Fracture of L1 vertebra ([address] 10/2010    GERD (gastroesophageal reflux disease)     Gout     High altitude pulmonary edema 2010    Hyperlipidemia     Hypertension     Radiculopathy of leg     left leg    Sleep apnea     Treated C-PAP 10 years    Thyroid disease     Wrist fracture, left 10/2010       Past Surgical History:    Past Surgical History:   Procedure Laterality Date    COLON SURGERY      perirectal abcess    PACEMAKER INSERTION         Social History:   Social History     Tobacco Use    Smoking status: Former     Packs/day: 0.50     Years: 8.00     Pack years: 4.00     Types: Cigarettes     Quit date: 1981     Years since quittin.9    Smokeless tobacco: Former   Substance Use Topics    Alcohol use:  Yes     Alcohol/week: 1.0 standard drink     Types: 1 Glasses of wine per week     Comment: occ       Family History:   Family History   Problem Relation Age of Onset    Pacemaker Mother     Seizures Mother     COPD Father     Hypertension Sister          Review of Systems:  Constitutional: negative for fever or chills  Eyes: negative for visual disturbance   ENT: negative for sore throat or nasal congestion  Respiratory: neg for cough or shortness of breath  Cardiovascular: negative for chest pain or palpitations  Gastrointestinal: negative for abd pain, nausea, vomiting, diarrhea or constipation  Genitourinary: negative for dysuria, urgency or frequency  Integument/breast: negative for skin rash or lesions  Neurological: negative for unilateral weakness, numbness or tingling. Skeletal Muscular: no joint pain or swelling      Objective:  Physical Exam:  GEN:   A & O x3, no apparent distress  EYES: No gross abnormalities. ENT:ENT exam normal, no neck nodes or sinus tenderness  NECK: normal, supple, no lymphadenopathy,  no carotid bruits  PULM: clear to auscultation bilaterally- no wheezes, rales or rhonchi, normal air movement, no respiratory distress  COR: regular rate & rhythm and no gallops  ABD:  soft, non-tender  : deferred  EXT: normal strength, tone, and muscle mass  NEURO: Motor and sensory grossly intact  SKIN:  No skin lesions or rashes        Labs:  Lab Results   Component Value Date    GLUCOSE 103 (H) 10/21/2022    BUN 18 10/21/2022    CREATININE 1.06 10/21/2022     10/21/2022    K 4.3 10/21/2022    CALCIUM 9.4 10/21/2022     10/21/2022    CO2 27 10/21/2022    LABGLOM >60 10/21/2022       Assessment:  1. Essential hypertension    2. Chronic diastolic CHF (congestive heart failure), NYHA class 2 ([address]    3. Hyperglycemia          Plan:  1. Essential hypertension    2. Chronic diastolic CHF (congestive heart failure), NYHA class 2 ([address]    3. Hyperglycemia      Encouraged low fat and low sodium diet. Goal for blood pressure control is 130/80  Recommended regular exercise as tolerated, 3-5 times per day    No orders of the defined types were placed in this encounter. Current Outpatient Medications   Medication Sig Dispense Refill    tamsulosin (FLOMAX) 0.4 MG capsule Take 1 capsule by mouth daily 90 capsule 3    levothyroxine (SYNTHROID) 125 MCG tablet Take 1 tablet by mouth Daily 90 tablet 3    ipratropium (ATROVENT) 0.03 % nasal spray 2 sprays by Each Nostril route in the morning and 2 sprays in the evening.  27 Patient was seen and examined at bedside. Case discuss with the primary team. Pt without any events overnight.     OBJECTIVE:  Vital Signs Last 24 Hrs  T(C): 36.2 (25 Jan 2025 12:55), Max: 36.9 (24 Jan 2025 17:34)  T(F): 97.1 (25 Jan 2025 12:55), Max: 98.5 (24 Jan 2025 17:34)  HR: 82 (25 Jan 2025 12:14) (70 - 84)  BP: 126/89 (25 Jan 2025 12:14) (109/78 - 133/89)  BP(mean): 103 (25 Jan 2025 12:14) (90 - 107)  RR: 14 (25 Jan 2025 12:14) (14 - 18)  SpO2: 97% (25 Jan 2025 12:14) (97% - 100%)    Parameters below as of 25 Jan 2025 12:14  Patient On (Oxygen Delivery Method): ventilator    O2 Concentration (%): 40      PHYSICAL EXAM:  General: minimally responsive on ventilator, NAD, no labored breathing   HEENT: trach collar in place, clean  Lungs: anterior exam, limited, no crackles, no wheezes  Heart: regular  Abdomen: soft, no distension, + BS  Extremities:  warm, trace edema, not following commands       LABS:                        9.5    8.20  )-----------( 177      ( 25 Jan 2025 05:30 )             30.3     01-25    139  |  102  |  39[H]  ----------------------------<  105[H]  3.6   |  26  |  1.07    Ca    9.3      25 Jan 2025 05:30  Phos  4.4     01-25  Mg     2.1     01-25    Phenytoin 4.6     1/24 CXR: Discoid change left lung base. Left effusion cannot be   excluded    acetaminophen   Oral Liquid .. 650 milliGRAM(s) Oral every 6 hours PRN  acetylcysteine 10%  Inhalation 4 milliLiter(s) Inhalation every 6 hours  albuterol/ipratropium for Nebulization 3 milliLiter(s) Nebulizer every 6 hours  amLODIPine   Tablet 5 milliGRAM(s) Oral daily  artificial tears (preservative free) Ophthalmic Solution 1 Drop(s) Right EYE every 4 hours  baclofen 5 milliGRAM(s) Oral every 12 hours  chlorhexidine 0.12% Liquid 15 milliLiter(s) Oral Mucosa every 12 hours  chlorhexidine 2% Cloths 1 Application(s) Topical <User Schedule>  doxazosin 8 milliGRAM(s) Oral at bedtime  erythromycin   Ointment 1 Application(s) Right EYE at bedtime  fluticasone propionate 50 MICROgram(s)/spray Nasal Spray 1 Spray(s) Both Nostrils two times a day  heparin   Injectable 5000 Unit(s) SubCutaneous every 8 hours  influenza   Vaccine 0.5 milliLiter(s) IntraMuscular once  levETIRAcetam 1500 milliGRAM(s) Oral two times a day  ofloxacin 0.3% Solution 1 Drop(s) Right EYE four times a day  pantoprazole  Injectable 40 milliGRAM(s) IV Push daily  petrolatum Ophthalmic Ointment 1 Application(s) Both EYES two times a day  phenytoin   Suspension 100 milliGRAM(s) Oral <User Schedule>  phenytoin   Suspension 200 milliGRAM(s) Oral <User Schedule>  propranolol 5 milliGRAM(s) Oral every 12 hours  sodium chloride 0.65% Nasal 1 Spray(s) Both Nostrils two times a day                     mL 3    finasteride (PROSCAR) 5 MG tablet Take 1 tablet by mouth daily 90 tablet 3    allopurinol (ZYLOPRIM) 300 MG tablet Take 1 tablet by mouth daily 90 tablet 2    albuterol sulfate HFA (PROVENTIL;VENTOLIN;PROAIR) 108 (90 Base) MCG/ACT inhaler Inhale 2 puffs into the lungs every 6 hours as needed for Wheezing 18 g 2    metFORMIN (GLUCOPHAGE) 500 MG tablet Take 1 tablet by mouth 2 times daily (with meals) 180 tablet 3    valsartan (DIOVAN) 40 MG tablet Take 1 tablet by mouth daily Patient taking half tablet daily. (Patient taking differently: Take 40 mg by mouth daily) 90 tablet 3    ELIQUIS 5 MG TABS tablet TAKE ONE TABLET BY MOUTH TWICE A  tablet 3    atorvastatin (LIPITOR) 10 MG tablet Take 1 tablet by mouth daily 90 tablet 3     No current facility-administered medications for this visit. No follow-ups on file.       Electronically signed by Marcelle Sandy MD on 10/27/2022 at 2:21 PM

## 2025-01-25 NOTE — PROGRESS NOTE ADULT - SUBJECTIVE AND OBJECTIVE BOX
EPILEPSY PROGRESS NOTE:     Interval Events:  Patient seen and examined at bedside. No events overnight. Denies having pain or dizziness.     ROS: Unable to obtain due to being nonverbal    MEDICATIONS  (STANDING):  acetylcysteine 10%  Inhalation 4 milliLiter(s) Inhalation every 6 hours  albuterol/ipratropium for Nebulization 3 milliLiter(s) Nebulizer every 6 hours  amLODIPine   Tablet 5 milliGRAM(s) Oral daily  artificial tears (preservative free) Ophthalmic Solution 1 Drop(s) Right EYE every 4 hours  baclofen 5 milliGRAM(s) Oral every 12 hours  chlorhexidine 0.12% Liquid 15 milliLiter(s) Oral Mucosa every 12 hours  chlorhexidine 2% Cloths 1 Application(s) Topical <User Schedule>  doxazosin 8 milliGRAM(s) Oral at bedtime  erythromycin   Ointment 1 Application(s) Right EYE at bedtime  fluticasone propionate 50 MICROgram(s)/spray Nasal Spray 1 Spray(s) Both Nostrils two times a day  heparin   Injectable 5000 Unit(s) SubCutaneous every 8 hours  influenza   Vaccine 0.5 milliLiter(s) IntraMuscular once  levETIRAcetam 1500 milliGRAM(s) Oral two times a day  ofloxacin 0.3% Solution 1 Drop(s) Right EYE four times a day  pantoprazole  Injectable 40 milliGRAM(s) IV Push daily  petrolatum Ophthalmic Ointment 1 Application(s) Both EYES two times a day  phenytoin   Suspension 100 milliGRAM(s) Oral <User Schedule>  phenytoin   Suspension 200 milliGRAM(s) Oral <User Schedule>  propranolol 5 milliGRAM(s) Oral every 12 hours  sodium chloride 0.65% Nasal 1 Spray(s) Both Nostrils two times a day    MEDICATIONS  (PRN):  acetaminophen   Oral Liquid .. 650 milliGRAM(s) Oral every 6 hours PRN Temp greater or equal to 38C (100.4F), Mild Pain (1 - 3)      T(C): 36.2 (01-25-25 @ 12:55), Max: 36.9 (01-24-25 @ 17:34)  HR: 77 (01-25-25 @ 12:45) (70 - 84)  BP: 126/89 (01-25-25 @ 12:14) (109/78 - 133/89)  RR: 16 (01-25-25 @ 12:45) (14 - 16)  SpO2: 100% (01-25-25 @ 12:45) (97% - 100%)  Wt(kg): --    General:  Constitutional: appears comfortable, NAD, trach in place connected to vent  Ears, Nose, Throat: no abnormalities, mucus membranes moist  Neck: supple, no lymphadenopathy  Extremities: no edema, clubbing or cyanosis  Skin: no rash or neurocutaneous signs   Neurological Exam:   Mental status: Awake, occasionally briefly tracking interviewer. Recognizes his name. Able to blink or shut eyes as "yes or no" response. +b/l eyelid and eyebrow twitching L>R. Intermittent rapid vertical eye nystagmus. Does not move extremities. Unable to assess strength and sensation.     Investigations:  CBC Full  -  ( 25 Jan 2025 05:30 )  WBC Count : 8.20 K/uL  RBC Count : 3.17 M/uL  Hemoglobin : 9.5 g/dL  Hematocrit : 30.3 %  Platelet Count - Automated : 177 K/uL  Mean Cell Volume : 95.6 fl  Mean Cell Hemoglobin : 30.0 pg  Mean Cell Hemoglobin Concentration : 31.4 g/dL  Auto Neutrophil # : x  Auto Lymphocyte # : x  Auto Monocyte # : x  Auto Eosinophil # : x  Auto Basophil # : x  Auto Neutrophil % : x  Auto Lymphocyte % : x  Auto Monocyte % : x  Auto Eosinophil % : x  Auto Basophil % : x    01-25    139  |  102  |  39[H]  ----------------------------<  105[H]  3.6   |  26  |  1.07    Ca    9.3      25 Jan 2025 05:30  Phos  4.4     01-25  Mg     2.1     01-25    TPro  x   /  Alb  3.4  /  TBili  x   /  DBili  x   /  AST  x   /  ALT  x   /  AlkPhos  x   01-25    LIVER FUNCTIONS - ( 25 Jan 2025 05:30 )  Alb: 3.4 g/dL / Pro: x     / ALK PHOS: x     / ALT: x     / AST: x     / GGT: x             Phenytoin Level, Serum: 4.6 ug/mL (01-25 @ 05:30)   EPILEPSY PROGRESS NOTE:     Interval Events:  Patient seen and examined at bedside. No events overnight. Denies having pain or dizziness based on eye closure in response.    ROS: Unable to obtain due to being nonverbal    MEDICATIONS  (STANDING):  acetylcysteine 10%  Inhalation 4 milliLiter(s) Inhalation every 6 hours  albuterol/ipratropium for Nebulization 3 milliLiter(s) Nebulizer every 6 hours  amLODIPine   Tablet 5 milliGRAM(s) Oral daily  artificial tears (preservative free) Ophthalmic Solution 1 Drop(s) Right EYE every 4 hours  baclofen 5 milliGRAM(s) Oral every 12 hours  chlorhexidine 0.12% Liquid 15 milliLiter(s) Oral Mucosa every 12 hours  chlorhexidine 2% Cloths 1 Application(s) Topical <User Schedule>  doxazosin 8 milliGRAM(s) Oral at bedtime  erythromycin   Ointment 1 Application(s) Right EYE at bedtime  fluticasone propionate 50 MICROgram(s)/spray Nasal Spray 1 Spray(s) Both Nostrils two times a day  heparin   Injectable 5000 Unit(s) SubCutaneous every 8 hours  influenza   Vaccine 0.5 milliLiter(s) IntraMuscular once  levETIRAcetam 1500 milliGRAM(s) Oral two times a day  ofloxacin 0.3% Solution 1 Drop(s) Right EYE four times a day  pantoprazole  Injectable 40 milliGRAM(s) IV Push daily  petrolatum Ophthalmic Ointment 1 Application(s) Both EYES two times a day  phenytoin   Suspension 100 milliGRAM(s) Oral <User Schedule>  phenytoin   Suspension 200 milliGRAM(s) Oral <User Schedule>  propranolol 5 milliGRAM(s) Oral every 12 hours  sodium chloride 0.65% Nasal 1 Spray(s) Both Nostrils two times a day    MEDICATIONS  (PRN):  acetaminophen   Oral Liquid .. 650 milliGRAM(s) Oral every 6 hours PRN Temp greater or equal to 38C (100.4F), Mild Pain (1 - 3)      T(C): 36.2 (01-25-25 @ 12:55), Max: 36.9 (01-24-25 @ 17:34)  HR: 77 (01-25-25 @ 12:45) (70 - 84)  BP: 126/89 (01-25-25 @ 12:14) (109/78 - 133/89)  RR: 16 (01-25-25 @ 12:45) (14 - 16)  SpO2: 100% (01-25-25 @ 12:45) (97% - 100%)  Wt(kg): --    General:  Constitutional: appears comfortable, NAD, trach in place connected to vent  Ears, Nose, Throat: no abnormalities, mucus membranes moist  Neck: supple, no lymphadenopathy  Extremities: no edema, clubbing or cyanosis  Skin: no rash or neurocutaneous signs   Neurological Exam:   Parted lips when asked to open mouth - saliva dribbled out, then he closed his mouth again.  Mental status: Awake, occasionally briefly tracking interviewer. Recognizes his name. Able to blink or shut eyes as "yes or no" response. +b/l eyelid and eyebrow twitching L>R. Intermittent rapid vertical eye nystagmus. Does not move extremities. Unable to assess strength and sensation.     Investigations:  CBC Full  -  ( 25 Jan 2025 05:30 )  WBC Count : 8.20 K/uL  RBC Count : 3.17 M/uL  Hemoglobin : 9.5 g/dL  Hematocrit : 30.3 %  Platelet Count - Automated : 177 K/uL  Mean Cell Volume : 95.6 fl  Mean Cell Hemoglobin : 30.0 pg  Mean Cell Hemoglobin Concentration : 31.4 g/dL  Auto Neutrophil # : x  Auto Lymphocyte # : x  Auto Monocyte # : x  Auto Eosinophil # : x  Auto Basophil # : x  Auto Neutrophil % : x  Auto Lymphocyte % : x  Auto Monocyte % : x  Auto Eosinophil % : x  Auto Basophil % : x    01-25    139  |  102  |  39[H]  ----------------------------<  105[H]  3.6   |  26  |  1.07    Ca    9.3      25 Jan 2025 05:30  Phos  4.4     01-25  Mg     2.1     01-25    TPro  x   /  Alb  3.4  /  TBili  x   /  DBili  x   /  AST  x   /  ALT  x   /  AlkPhos  x   01-25    LIVER FUNCTIONS - ( 25 Jan 2025 05:30 )  Alb: 3.4 g/dL / Pro: x     / ALK PHOS: x     / ALT: x     / AST: x     / GGT: x             Phenytoin Level, Serum: 4.6 ug/mL (01-25 @ 05:30)

## 2025-01-25 NOTE — PROGRESS NOTE ADULT - ASSESSMENT
44 year-old-male with unclear PMH (?stroke, MI) who was found down at work, intubated for airway protection and found to have acute large parenchymal hemorrhage within nabil with mass effect (+ acute/subacute right cerebellar infarct) in setting of hypertensive emergency, and R cerebellar stroke transferred to [facility] for further neurosurgical care. 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 Epilepsy recalled as pt with vertical eye movements that appeared rhythmic and resolved after given AM Keppra +additional 500mg. Dilantin level 3.3. High suspicion for continued focal seizures, however given location of the bleed, suspect that repeating EEG will have limited utility. Unclear if EEG negative for focal seizures or hemifacial spasm is from pontine irritation. Given low therapeutic level, will recommend increasing Dilantin and monitoring clinically for improvement.    Recommendations:  - Continue Keppra 1500mg Q12hrs  - Increase Phenytoin to 125/125/200mg   - Recheck Phenytoin level 1/26 AM  - Maintain seizure/fall/aspiration precautions    Patient seen and discussed with Dr. Farley. Recommendations discussed with primary team.

## 2025-01-25 NOTE — PROGRESS NOTE ADULT - SUBJECTIVE AND OBJECTIVE BOX
HPI:  Unknown age male, unknown past medical history (reported stroke and MI by coworkers) presented to [facility] with AMS, Pt was working at [employer] when he was found down by coworkers. EMS called and pt brought to [facility] ED. Intubated, sedated, started on cardene for SBPs in 200s. CT head showed brain stem bleed. Transferred to NSICU for further management.  (30 Sep 2024 12:55)    OVERNIGHT EVENTS: Seen and examined in 8LA. Unable to elicit any complaints at this time.   HOSPITAL COURSE:  9/30: transferred from [facility]. A line placed. Versed dc'd. Roomate Prasanth at bedside, states pt has family in [place], cannot confirm medications or PMH other than stroke and MI. 250cc bolus 3% given. LR switched to NS. hydralazine 25q8 started, 3% started, switched propofol to precedex   10/1: stability CTH done. Added labetalol, started TF. Palliative consulted. ethics consulted to determine surrogate. febrile 103, pan cx sent  10/2: BD 2, GEORGE overnight. TF resumed. Desatt'd to 80s, FiO2 inc. to 50. Fentanyl given, ABG, CXR ordered. Maxxed on precedex, started on propofol for DARIEN -4 - -5. Precedex dc'd. Duonebs, mucomyst, hypertonic added. 3% dc'd. Cardene dc'd. Start vanc/CTX. Increased labetalol 200q8. MRSA negative, dc'd vanc. ETT pulled back 2cm x 2, good positioning after confirmatory chest xray. Ethics attempting to establish HCP with family. Na 159, starting FW 250q6 for range 150-155.   10/3: BD3, GEORGE o/n, neuro stable. Na elevating, FW increased to 300q6. Dc'd bowel reg for diarrhea. vEEG started. SQH 5000q8 tonight.   10/4: BD 4, albumn bolus, incr. LR to 80 2/2 incr. in Cr, LR to 10 0cc/hr for uptrending Cr. Started 7% hypersal for 48hrs and SL atropine for inline/oral thick secretions. Dc'd CTX and started ancef for MSSA in the sputum. Nephrology consulted for CKD, f/u recs. SBP 170s, given hydralazine 10mg IVP.   10/5: BD5, o/n 10mg IVP hydralazine given for SBP 170s and started on hydralazine 25q8 via OGT. 10mg IV push labetalol for SBP > 160s. RT placed for diarrhea.   10/6: BD6, o/n FW increased to 350q4 per nephrology recs. IV tylenol for temp 100.6, SBp 160s presumed uncomfortable.   10/7: BD7, overnight pancultured for temp 101.8F.   10/8: BD8. GEORGE. Cr bumped. decreased LR to 75cc/hr. Adding simethicone ATC. incr hydralazine 50mgTID. Incr labetalol 300mgTID. Na 145, decreased FWF to 250q6. Start precedex. FENa consistent with intrinsic kidney injury. Pend repeat renal US. Retaining up to 1.3L, bladder scans q6, straight cath PRN  10/9: BD 9. GEORGE overnight. Neuro stable. abd xray for distention w non-specific gas pattern, OGT to LIWS for morning. duonebs/mucomyst to q8 for improving secretions. Changed tube feeds to Jevity 1.5 20cc/hr, low rate due to abdominal distention, nepro dense and more difficult to digest. Tolerating CPAP, confirmed by ABG.   10/10: BD 10. GEORGE overnight. Neuro stable. (+) gabriel for urinary retention on bladder scan. inc TF to goal rate of 40cc/hr. family leaning toward pursuing trach/PEG. 1/2 amp for FS 81.   10/11: BD 11. GEORGE overnight. Neuro stable. Trach/PEG consults placed.   10/12: BD 12. GEORGE overnight. Neuro stable. MRI brain complete.   10/13: BD 13. Increase flomax. Hold SQH after PM dose for trach tm. IVL.   10/14: BD 14. GEORGE overnight, remains on AC/VC. Gabriel placed for urinary retention. Dc'd free water.  S/p trach with pulm. NGT placed and CXR confirmed in good position.   10/15: BD 15, GEORGE ovn. resumed feeds. spiked 101, pan cx sent.   10/16: BD 16. GEORGE ovn. Lokelma 5mg for K+ 5.4. Started vanc q 24/zosyn for empiric PNA coverage, IVF to 100/hr. PEG held for fever.   10/17: BD 17,  ordered serum osm and urine osm for am. Started sinemet for neurostimulation. Increased cardura to 0.8. Started FW 100q4, dc'd IVF. MRSA negative, dc'd vanc. NGT replaced d/t coiling.   10/18: BD 18, GEORGE overnight, neuro stable. Amantadine added for neurostim. zosyn changed to unasyn for acinetobacter baumannii, failed TOV and required SC  10/19: BD 19, GEORGE ovn. cardura 2mg added for retention. labetalol decreased 200q8, hydralazine decreased 25q8. Gabriel replaced.   10/20: BD20, GEORGE overnight. NGT dislodged, replaced. PEG tomorrow w/ gen surg, FW increased to 150q4 and labetalol decreased to 100q8, lokelma given for hyperkalemia.   10/21: BD 21. POD0 PEG placement with Gen surg. decr labetolol to 50q8, incr. cardura to 0.4, started lokelma and phoslo, dc gabriel POD0 PEG placement with Gen surg.  10/22: BD 22. Plan to start TF today via PEG. dc labetalol, Following ophtho recs. Increased apnea settings - found to be in cheyne-moe respiration. CPAP 5/5.  10/23: BD 23. hydralazine d/c'd, trach collar trial today. Rectal tube placed at 6am.  10/24: BD24, o/n lokelma held due to diarrhea. Free water 100q6 resumed. dc'd tamsulosin, amantadine. Incr'd cardura to 8mg qhs. Dc'd FW. Switched jevity to nepro. gabriel placed for high urine output. Started SL atropine for oral secretions. Dc'd free water.  10/25: BD25, o/n decreased suctioning requirements to > q4hrs, GEORGE. Cr improving, cont phoslo, lokelma held at this time. Gabriel placed yest, cont. Tolerating trach collar. Given 500cc plasmalyte bolus for NAIKA. Dc'd sinemet.   10/26: BD26, o/n resumed lokelma 5mg daily and resumed 100cc free water q6hrs. Change in neuro status with new right pupillary dilation with anisocoria (right pupil 6mm fixed and left pupil 3mm briskly reactive). Given 23.4% NaCl bullet, taken for emergent CTH showing mostly resolved pontine hemorrhage, continued brainstem hypodensity likely edema d/t hemorrhage, no new hemorrhage or infarct, no herniation, mild increase in size of left lateral ventricle. Vitals remaine stable. Na goal > 140.   10/27: BD27, o/n GEORGE.Neuro stable. Pend stepdown with airway bed.   10/28: BD 28. GEORGE overnight. Neuro stable. Miralax ordered. Gabriel removed, pending TOV.  10/29: BD 29. GEORGE o/n. Given 2L NS over 8 hrs for increased BUN/Cr ratio. Gabriel placed for frequent straight cath.   10/30: BD 30.   10/31: BD 31. GEORGE overnight. Na 149, increased free water to 200q6. 1L NS for uptrending BUN.   11/1: BD 32. GEORGE overnight. Given 1L NS for dehydration. Na 146, increased FW to 250q6.   11/2: BD 33 GEORGE overnight, neuro stable, given 500cc bolus for net negative status and tachycardia   11/3: BD 34, GEORGE overnight, neuro stable. Patient remains tachycardic, EKG showing sinus tachycardia, given additional 500cc NS bolus. Febrile to 101.9F, pan cultured (without UA), CXR WNL, given tylenol.   11/4: BD 35, GEORGE overnight, neuro stable. Given 1L NS for tachycardia. sputum (+) for stenotropohomas maltophilia.   11/5: BD 36 GEORGE overnight, neuro stable. Vancomycin dc'd. Chest PT BID. ID consulted, cont zosyn.  11/6: BD 37. blood cx + klebsiella dc zosyn changed to cefepime, CTAP ordered, rpt blood cx sent.    11/7: BD 38. Pending CT A/P, given 250cc bolus and starting maintenance fluids overnight. Pending CT A/P after bolus   11/8: BD 39. CT CAP negative for infection.   11/9: BD 40. GEORGE overnight.  11/10: BD 41. GEORGE overnight. desat to 85 on trach collar, O2 inc to 10L and 100%, O2 sat inc to 95. pt tachy to 110s, euvolemic. given tylenol. ABG and CXR ordered. spiked fever, pancultured, RVP negative. AM ABG w pO2 79, rpt w pO2 79. pt appears comfortable, satting 94%.   11/11: BD 42. GEORGE overnight. pt became tachy to 130s, desat to 90 on 100% FiO2 and 10L. suctioned, (+) productive cough. temp 101.4, given 1g IV tylenol and 500cc NS bolus for euvolemia. fever and HR downtrending. LE dopplers negative for dvt  11/12: BD 43, GEORGE ovn, fever and HR downtrending, satting 97% 70% FIO2  11/13: BD 44, GEORGE ovn. started standing tylenol x24 hours for tachycardia. desat to 80s, o2 increased. CXR stable, pending CTA PE protocol.   11/14: BD 45, GEORGE overnight, neuro stable. resp therapy dec FiO2 to 70%.   11/15: BD 46, GEORGE overnight, neuro stable.  Rapid called for desaturation 30s, tachycardic 140s. Patient bagged, 100% fio2, heavily suctioned. CXR/POCUS unremarkable. ABG c/w desaturation. WBC 14.71. Afebrile. O2 improved to 90s and patient upgraded to ICU. ABG paO2 30s improved to 89 on vent. IV Tylenol x 1, sputum sent. Start protonix while o-n vent.   11/16: BD 47. POCUS showed collapsable IVCF, given 1L bolus. Vanco/Cefpime added empriic for PNA, NGT feeds restarted. MRSA swab neg, Vanc DC'd.   11/17: BD 48. GEORGE overnight. 1l bolus for tachycardia. Spiked to 101, cultured. 500cc bolus for tachycardia, tachy to 148 given 25mcg fentanyl, 250cc albumin, 1.5L bolus. 5 IV lopressor with response HR to 100s. +Stenotrophomonas on sputum cx.   11/18: BD 49. GEORGE overnight. Consulted ID, cefepime switched to bactrim x7days. Started hydrochlorothiazine 12.5mg daily.  11/19: BD 50. Tachy 120s, given tylenol and 500cc NS. Tolerating 5/5, switched to TCx. Holding phos binder. D/c Bactrim. D/c gabriel, f/u TOV. Dc'd PPI.   11/20: BD 51. GEORGE ovn. 1600 satting low 90s, mildly tachy to 110s, afebrile, RR wnl. O2 improved to mid 90s while inc O2 to 100% on TCx. CPAP 5/5 placed back on.  11/21: BD 52, GEORGE ovn, tolerating CPAP 5/5. Switch to trach collar during the day if tolerating well. HCTZ held for Cr bump, straight cath frequence increased to q4  11/22: BD 53, GEORGE ovn. Resumed phoslo. Gabriel placed. Resumed HCTZ.   11/23: BD 54. Holding tylenol in setting of possible fever, will require pan cx if febrile. Cr improved today. Cont CPAP. Bowel regimen held i/s/o diarrhea. FOBT negative.  11/24: BD 55. GEORGE overnight. Neuro stable. HCTZ dc'ed, started lisinopril 5. Lokelma dc'ed for K 3.7.   11/25: BD 56, GEORGE overnight. Neuro stable. dc'd lisinopril 5mg. Gabriel dc'd. TOV. 1545 noted to be hypotensive, MAP 50, in supine position on chair, HR 60s, afebrile, O2 96%. Given 1L cc NS bolus, placed back on bed in reverse trendelenberg, improved to map of 66. Neostick at bedside. Vitals check q1h. Dc'ed amlodipine. Failed TOV, bladder scan q6, sc prn. Added back Senna.   11/26: BD 57, GEORGE overnight. Neuro stable. Dc'd phoslo.   11/27: BD 58, GEORGE overnight. Neuro stable.    11/28: BD 59. Gabriel replaced.   11/29: GEORGE.  11/30: GEORGE, neuro stable.   12/1: BD 62, GEORGE overnight  12/2: BD 63, GEORGE overnight.; Given 1L bolus of LR for uptrending BUN/Cr.  12/3: BD 64. Reinstated eye gtt/moisture chamber given increased Rt eye injection  12/4: BD 65. GEORGE overnight. Attempted to speak with ophtho regarding eyelid weight/closure but no answer, full mailbox.   12/5: BD 66, GEORGE overnight, bowel regimen increased and had BM.   12/6: BD 67, GEORGE overnight, neuro stable.  12/7: GEORGE overnight, neuro stable. /110s, given x1 hydralazine 10 mg IVP. Restarted home amlodipine 5mg.  12/8: GEORGE. OOB to chair.     12/11: GEORGE, mucomyst added for thick secretions, simethicone for abd distension, abd xray with stool burden, increased bowel regimen.   12/12: GEORGE overnight.   12/13: GEORGE overnight.   12/14: GEORGE overnight  12/15: o/n Patient became tachycardic HR 120s and 10 minutes later O2 sat dropped as low as 89%. Patient suctioned without improvement in O2 sat and tube feeds found in suction catheter. TFs held.  STAT CXR ordered. STAT labs sent. Respiratory therapist called to bedside and patient trach connected to ventilator. After connection to ventilator and further suctioning O2 sat improved to 97% but patient HR remains 120-130s. Upgraded to NSICU for further management. Vancomycin and zosyn started. CTA  chest PE protocol and CTH ordered. Blood cultures sent.given 500cc bolus, rpt ABG sent pO2 243, CTH and CTA chest done. FS while NPO, FiO2 dec 50 pending ABG. sputum cx positive for few GPC and GNR.   12/16: GEORGE overnight, restarted amlodipine, troponin 75   12/17: GEORGE overnight, neuro stable. Attempted CPAP this morning, did not tolerate and back on full vent support. Dc'd Vanc. Tachycardia to 140s, noted extremities to be twitching along with jaw twitching. Given 2g of Keppra, total 4mg ativan and placed on EEG, full set of labs and lactate negative. Resumed trickle feeds at 20cc/hr. Given 250cc albumin for tachycardia.   12/18: GEORGE overnight. Neuro stable. CTH stable. EEG negative and dc'd.   12/19: GEORGE overnight. neuro stable. SIMV most of day, AC/VC at night.   12/20: GEORGE overnight, neuro stable. remains on VC/AC  12/21: GEORGE ovn. 1L bolus for tachy to 120s-130s.   12/22: GEORGE ovn. Tachy to 120s, given tylenol and IVF. 2mg IV ativan given for L jaw twitching. Sx resolved for 3 minutes and started again. Epilepsy contacted. Given 2mg IV ativan. Continued twitching. Increased keppra to 1500mg BID, 2mg ativan given. Propranolol started for refractory tachycardia. vEEG ordered. albumin given. POCUS performed, no b lines. Started on fosphenytoin, loaded w 20mg/kg then 100mg TID. febrile, pancx, started cefepime 2g q8, vancomycin  12/23: GEORGE ovn. +focal motor seizures on eeg. ID consulted. Vancomycin dc'ed as per ID.  12/24: GEORGE ovn, neuro stable. Baclofen 5 mg q12 started for hiccups. Na 129 from 134. Urine lytes c/w SIADH. Given 250cc 3% bolus. CT chest for infection w/u - f/u read. Repeat Na 136. UA negative  12/25: GEORGE ovn.   12/26: GEORGE ovn  12/27: GEORGE overnight. Blood cx neg @ 4 days, DC cefepime per medicine (sputum colonized).   12/28: Desaturation o/n to 80's, CXR obtained, pulse ox changed and sats resolved. Na 133 from 138, 250cc 3% bolus given. Cefepime resumed until 12/30 per ID. Rpt Na 138.  12/29: GEORGE overnight. Na stable.   12/30: GEORGE overnight. Family meeting with son, pt now DNR.   12/31: GEORGE overnight.   1/1: GEORGE overnight, neuro stable.  1/2: GEORGE overnight, neuro stable. FW decreased to 100q6.   1/3: GEORGE overnight, neuro stable. fosphenytoin IV changed to pheytoin PO via PEG per epilepsy recommendations  1/4: GEORGE overnight, neuro stable. FW incr. to 100q4  1/5: GEORGE overnight, neuro stable.   1/6: GEOGRE overnight, neuro stable   1/7: GOERGE overnight, neuro stable. BUN/Cr increased, increased FW to 200q6. Given 1L bolus of LR over 2 hours. Gabriel d/c'd, voiding, continue bladder scan q6.   1/8: GEORGE overnight, neuro stable. BUN/Cr improving.  1/9: GEORGE overnight, neuro stable.   1/10: GEORGE overnight, neuro stable.   1/11: GEORGE overnight, neuro stable, vent settings stable.   1/12: GEORGE overnight, neuro stable. Febrile to 102F, f/u pan cx, chest xray, given tylenol. Zosyn started.   1/13: GEORGE overnight, neuro stable, cont Zosyn for presumed PNA, prelim sputum cx growing; few GS neg diplococci, mod GS neg rods, rare GS + rods, fever trend improved. Free water increased to [id number].   1/14: GEORGE overnight. pending renal US for elevated Cr  1/15: GEORGE ovn. renal US complete.   1/16: GEORGE overnight, neuro stable   1/17: GEORGE overnight, neuro stable   1/18: GEORGE overnight, neuro stable.   1/19: GEORGE overnight, neuro stable. Propranolol dec to 5q8.   1/20: GEORGE overnight, neuro stable. Weaned propranolol to 5mg BID.   1/21: GEORGE ovn, in AM having rapid vertical eye movements with facial twitching, 2g total keppra given for AM dose and phenytoin level ordered, vital signs stable. CTH stable. Phenytoin increased to 100/100/150mg per epilepsy  1/22: GEORGE ovn. IV hydral x 1 for SBP 170s.   1/23: Cont to have focal motor seizures. Per epilepsy, changed phenytoin dose to 100/100/200.   1/24: Phenytoin lvl tmrw AM. Chest Xray completed due to temp 100.2F  1/25: GEORGE overnight.     Vital Signs Last 24 Hrs  T(C): 36.8 (24 Jan 2025 21:51), Max: 37.9 (24 Jan 2025 04:34)  T(F): 98.2 (24 Jan 2025 21:51), Max: 100.2 (24 Jan 2025 04:34)  HR: 82 (25 Jan 2025 00:40) (78 - 104)  BP: 125/87 (25 Jan 2025 00:10) (111/67 - 138/92)  BP(mean): 102 (25 Jan 2025 00:10) (84 - 110)  RR: 15 (25 Jan 2025 00:40) (14 - 18)  SpO2: 100% (25 Jan 2025 00:40) (98% - 100%)    Parameters below as of 25 Jan 2025 00:40  Patient On (Oxygen Delivery Method): ventilator    O2 Concentration (%): 40    I&O's Summary    23 Jan 2025 07:01  -  24 Jan 2025 07:00  --------------------------------------------------------  IN: 1140 mL / OUT: 900 mL / NET: 240 mL    24 Jan 2025 07:01  -  25 Jan 2025 01:56  --------------------------------------------------------  IN: 1380 mL / OUT: 550 mL / NET: 830 mL        PHYSICAL EXAM:  General: NAD, pt is comfortably sitting up in bed, trach to ACVC  HEENT: PERRL 3mm briskly reactive, unable to assess EOMS, face appears symmetric  Cardiovascular: RRR, S1, S2  Respiratory: breathing non-labored on trach to ACVC, chest rise symmetric  GI: abd soft, NTND   Neuro: A&Ox3 eyebrow raises to choices, non-verbal, intermittently follows commands  RUE HG to command, 2/5. LUE 0/5, b/l LE w/d to noxious  Unable to assess sensation secondary to mental status   Extremities: distal pulses 2+ x4  Wound/incision: (+) trach (+) PEG     TUBES/LINES:  [] Gabriel  [] Wound Drains  [] Others      DIET:  [] NPO  [] Mechanical  [x] Tube feeds    LABS:                        10.7   10.36 )-----------( 164      ( 24 Jan 2025 06:56 )             34.4     01-24    134[L]  |  100  |  38[H]  ----------------------------<  104[H]  4.0   |  23  |  0.95    Ca    9.0      24 Jan 2025 06:56  Phos  2.9     01-24  Mg     2.0     01-24    TPro  6.9  /  Alb  3.2[L]  /  TBili  <0.2  /  DBili  0.1  /  AST  15  /  ALT  38  /  AlkPhos  178[H]  01-23      Urinalysis Basic - ( 24 Jan 2025 06:56 )    Color: x / Appearance: x / SG: x / pH: x  Gluc: 104 mg/dL / Ketone: x  / Bili: x / Urobili: x   Blood: x / Protein: x / Nitrite: x   Leuk Esterase: x / RBC: x / WBC x   Sq Epi: x / Non Sq Epi: x / Bacteria: x          CAPILLARY BLOOD GLUCOSE          Drug Levels: [] N/A  Phenytoin Level, Serum: 2.9 ug/mL (01-23 @ 05:30)  Phenytoin Level, Serum: 3.3 ug/mL (01-21 @ 07:59)    CSF Analysis: [] N/A      Allergies    Allergy Status Unknown    Intolerances      MEDICATIONS:  Antibiotics:    Neuro:  acetaminophen   Oral Liquid .. 650 milliGRAM(s) Oral every 6 hours PRN  baclofen 5 milliGRAM(s) Oral every 12 hours  levETIRAcetam 1500 milliGRAM(s) Oral two times a day  phenytoin   Suspension 100 milliGRAM(s) Oral <User Schedule>  phenytoin   Suspension 200 milliGRAM(s) Oral <User Schedule>    Anticoagulation:  heparin   Injectable 5000 Unit(s) SubCutaneous every 8 hours    OTHER:  acetylcysteine 10%  Inhalation 4 milliLiter(s) Inhalation every 6 hours  albuterol/ipratropium for Nebulization 3 milliLiter(s) Nebulizer every 6 hours  amLODIPine   Tablet 5 milliGRAM(s) Oral daily  artificial tears (preservative free) Ophthalmic Solution 1 Drop(s) Right EYE every 4 hours  chlorhexidine 0.12% Liquid 15 milliLiter(s) Oral Mucosa every 12 hours  chlorhexidine 2% Cloths 1 Application(s) Topical <User Schedule>  doxazosin 8 milliGRAM(s) Oral at bedtime  erythromycin   Ointment 1 Application(s) Right EYE at bedtime  fluticasone propionate 50 MICROgram(s)/spray Nasal Spray 1 Spray(s) Both Nostrils two times a day  influenza   Vaccine 0.5 milliLiter(s) IntraMuscular once  ofloxacin 0.3% Solution 1 Drop(s) Right EYE four times a day  pantoprazole  Injectable 40 milliGRAM(s) IV Push daily  petrolatum Ophthalmic Ointment 1 Application(s) Both EYES two times a day  propranolol 5 milliGRAM(s) Oral every 12 hours  sodium chloride 0.65% Nasal 1 Spray(s) Both Nostrils two times a day    IVF:    CULTURES:  Culture Results:   >=3 organisms. Probable collection contamination. (01-12 @ 15:25)  Culture Results:   No growth at 5 days (01-12 @ 12:50)    RADIOLOGY & ADDITIONAL TESTS:  n/a    ASSESSMENT:  44M PMH ?stroke/MI present to [facility] after collapsing at work. Decorticate posturing, vomiting, intubated for airway protection. 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 Transferred to [facility] for further management. s/p trach 10/14. s/p peg 10/21. Re-upgrade to ICU 2/2 desaturation event and suctioning requirements 11/15. Re-upgrade to NSICU 12/15 2/2 desaturation and tachycardia.    AMS    Intubate    Handoff    MEWS Score    Acute myocardial infarction    CVA (cerebral vascular accident)    Intracerebral hemorrhage of brain stem    Brainstem stroke    Brain stem stroke syndrome    Brain stem hemorrhage    Brain stem stroke syndrome    Hemorrhagic stroke    Brainstem stroke    Encephalopathy acute    Functional quadriplegia    Advanced care planning/counseling discussion    Encounter for palliative care    Pontine hemorrhage    Neurogenic dysphagia    Chronic respiratory failure    Acute kidney injury superimposed on CKD    Acute urinary retention    Hypertensive emergency    Sepsis, unspecified organism    Sepsis    Gram-negative bacteremia    Dyspnea    Percutaneous tracheal puncture    Altered mental status examination    EGD, with PEG    AMS    Room Service Assist    [email or web address]        PLAN:  Neuro:  - neuro/vitals q4h  - pain control: tylenol prn  - seizure tx: keppra 1500mg BID, phenytoin 100/100/200  - vEEG (10/3-4) negative, (10/17-10/19) negative, (12/17-12/18) negative, (12/22-12/25) +focal motor seizures not correlating w/ EEG  - CTH 9/30: enlarged pontine hemorrhage, CTH 10/3: stable, CTH 10/25: mostly resolved pontine hemorrhage, CTH 12/15: R mastoid air cell opacification; acute otitis media vs sterile effusion, CTH 12/18: stable. CTH 1/21 stable.  - MRI brain 10/12: parenchymal hemorrhage, acute/subacute R cerebellar stroke      CV:  - -160  - tachycardia: propranolol 5mg BID   - HTN: amlodipine 5mg  - echo (9/30) EF 75%, repeat 12/16: 57%    Resp:  - trach to vent, AC/VC 40/400/14/6  - Secretions: duonebs/mucomyst/chest PT q6h     GI:  - TF via PEG (placed 10/21 by gen surg)  - bowel regimen held for loose stool, last BM 1/23  - baclofen 5q12 for hiccups    Renal:  - IVL, FW 200q4 for hydration   - Urinary retenion: Gabriel removed 1/7, Voiding  - CKD: trend BUN/Cr  - renal US 10/1, 10/8, 1/15: Increased bilateral renal echogenicity consistent with medical renal disease    Endo:  - A1c 5.4    Heme:  - DVT ppx: SCDs, SQH 5000u q8h   - LE dopplers negative 12/16    ID:  - last pan cx 1/12   - empiric PNA: cefepime (12/22-12/30), s/p vanc (12/22-12/23), s/p zosyn (12/15-12/20), s/p vanc (12/15-12/16), s/p zosyn (1/12 -1/18)  - s/p Stenotrophomonas maltophilia PNA: s/p Bactrim (11/18-11/19) s/p Cefepime (11/16-11/18)  - 11/3, (+) sputum for stenotrophomas maltophlia, blood cx (+) klebsiella, cefepime 2gq12 (11/6 - 11/12)   - empiric tx: s/p zosyn (11/3-11/6) , s/p vanc  (11/3-11/5)  - S/p Ancef (10/4-10/14) for PNA, and s/p Unasyn (10/18-10/23) +actinobacter baumanii     MISC:  - ophtho consult for keratitis  - erythromycin ointment R eye q4hrs, ofloxacin ointment R eye QID, artificial tears R eye q2hrs, moisture chamber at bedtime    Dispo: SDU status, DNR, pending PRUCOL for benefits     D/w Dr. D'Amico   Assessment:  Present when checked    []  GCS  E   V  M     Heart Failure: []Acute, [] acute on chronic , []chronic  Heart Failure:  [] Diastolic (HFpEF), [] Systolic (HFrEF), []Combined (HFpEF and HFrEF), [] RHF, [] Pulm HTN, [] Other    [] ANIKA, [] ATN, [] AIN, [] other  [] CKD1, [] CKD2, [] CKD 3, [] CKD 4, [] CKD 5, []ESRD    Encephalopathy: [] Metabolic, [] Hepatic, [] toxic, [] Neurological, [] Other    Abnormal Nurtitional Status: [] malnurtition (see nutrition note), [ ]underweight: BMI < 19, [] morbid obesity: BMI >40, [] Cachexia    [] Sepsis  [] hypovolemic shock,[] cardiogenic shock, [] hemorrhagic shock, [] neuogenic shock  [] Acute Respiratory Failure  []Cerebral edema, [] Brain compression/ herniation,   [] Functional quadriplegia  [] Acute blood loss anemia

## 2025-01-25 NOTE — PROGRESS NOTE ADULT - ASSESSMENT
44M with unclear PMH (?stroke, MI) who was found down at work, intubated for airway protection and found to have acute parenchymal hemorrhage within nabil with mass effect (+ acute/subacute right cerebellar infarct) in setting of hypertensive emergency, transferred to [facility] for further neurosurgical care. Hospital course c/b poor neurologic recovery s/p trach-PEG, AUR s/p gabriel, ANIKA on CKD c/b hyperkalemia, HAP s/p amp-sulbactam (EOT 10/23), K aerogenes bacteremia  treated with 2 weeks of Cefepime per ID , On 11/15 he became septic and was transferred to NSICU due to increased o2 requirements and needed Vent support , Trach Cultures + for Stenotrophomonas which was treated with 7 days of Bactrim per ID , has been weaned of Vent since 11/23 and is now on 10lts at 40% o2 through Trach Collar. Stepped up to the NSICU on 12/15 for hypoxia and tachycardia. PE ruled out. On abx for 5 days. Unclear etiology. Now stepped down to 8Lach.    # Pontine hemorrhage  # Encephalopathy due to Intracranial Bleed   - Acute parenchymal hemorrhage within nabil with mass effect (+ acute/subacute right cerebellar infarct) in setting of hypertensive emergency.   - MRI brain also demonstrated acute/subacute R cerebellar stroke.   - No Neurosurgical Interventions were performed   - Secondary to IPH and cerebellar stroke patient has become Trach and PEG Dependent    # Seizures  - Continue Seizure precautions   - Continue Keppra and phenytoin  -Epilepsy following; Corrected phenytoin level is 4.7  -Will discuss with epilepsy about need to adjust phenytoin dose     # Hypertension  - Continue amlodipine 5mg daily with holding parameters  - Will continue to monitor BP and uptitrate antihypertensive as required     # Chronic respiratory failure.   - S/p percutaneous trach by pulm on 10/14/24  - Currently on Vent Support   - Continue Chest PT    # Neurogenic dysphagia.   - Pt s/p PEG with surgery 10/21/24  - TF per nutrition  - aspiration precautions and elevation of HOB.    #Acute urinary retention.   - doxazosin 8mg  - continue to monitor urine oupatient     # Functional quadriplegia in setting of brainstem hemorrhage  - decub precautions  - care per nursing protocol     # DVT prop: Heparin SQ q8  Discussed with primary team     55 minutes spent on total encounter. The necessity of the time spent during the encounter on this date of service was due to:    Review of hospital course, labs, vitals, medical records.  Bedside exam and interview of the patient  Discussed plan of care with primary team   Documenting the encounter.

## 2025-01-26 LAB
ADD ON TEST-SPECIMEN IN LAB: SIGNIFICANT CHANGE UP
ANION GAP SERPL CALC-SCNC: 12 MMOL/L — SIGNIFICANT CHANGE UP (ref 5–17)
ANION GAP SERPL CALC-SCNC: 12 MMOL/L — SIGNIFICANT CHANGE UP (ref 5–17)
BUN SERPL-MCNC: 35 MG/DL — HIGH (ref 7–23)
BUN SERPL-MCNC: 37 MG/DL — HIGH (ref 7–23)
CALCIUM SERPL-MCNC: 8.9 MG/DL — SIGNIFICANT CHANGE UP (ref 8.4–10.5)
CALCIUM SERPL-MCNC: 9.2 MG/DL — SIGNIFICANT CHANGE UP (ref 8.4–10.5)
CHLORIDE SERPL-SCNC: 102 MMOL/L — SIGNIFICANT CHANGE UP (ref 96–108)
CHLORIDE SERPL-SCNC: 99 MMOL/L — SIGNIFICANT CHANGE UP (ref 96–108)
CK SERPL-CCNC: 23 U/L — LOW (ref 30–200)
CO2 SERPL-SCNC: 24 MMOL/L — SIGNIFICANT CHANGE UP (ref 22–31)
CO2 SERPL-SCNC: 25 MMOL/L — SIGNIFICANT CHANGE UP (ref 22–31)
CREAT SERPL-MCNC: 1.05 MG/DL — SIGNIFICANT CHANGE UP (ref 0.5–1.3)
CREAT SERPL-MCNC: 1.06 MG/DL — SIGNIFICANT CHANGE UP (ref 0.5–1.3)
EGFR: 88 ML/MIN/1.73M2 — SIGNIFICANT CHANGE UP
EGFR: 88 ML/MIN/1.73M2 — SIGNIFICANT CHANGE UP
EGFR: 89 ML/MIN/1.73M2 — SIGNIFICANT CHANGE UP
EGFR: 89 ML/MIN/1.73M2 — SIGNIFICANT CHANGE UP
GLUCOSE SERPL-MCNC: 118 MG/DL — HIGH (ref 70–99)
GLUCOSE SERPL-MCNC: 98 MG/DL — SIGNIFICANT CHANGE UP (ref 70–99)
HCT VFR BLD CALC: 30.3 % — LOW (ref 39–50)
HCT VFR BLD CALC: 31.1 % — LOW (ref 39–50)
HGB BLD-MCNC: 10 G/DL — LOW (ref 13–17)
HGB BLD-MCNC: 9.8 G/DL — LOW (ref 13–17)
MAGNESIUM SERPL-MCNC: 2 MG/DL — SIGNIFICANT CHANGE UP (ref 1.6–2.6)
MAGNESIUM SERPL-MCNC: 2.1 MG/DL — SIGNIFICANT CHANGE UP (ref 1.6–2.6)
MCHC RBC-ENTMCNC: 29.9 PG — SIGNIFICANT CHANGE UP (ref 27–34)
MCHC RBC-ENTMCNC: 31.3 PG — SIGNIFICANT CHANGE UP (ref 27–34)
MCHC RBC-ENTMCNC: 31.5 G/DL — LOW (ref 32–36)
MCHC RBC-ENTMCNC: 33 G/DL — SIGNIFICANT CHANGE UP (ref 32–36)
MCV RBC AUTO: 94.8 FL — SIGNIFICANT CHANGE UP (ref 80–100)
MCV RBC AUTO: 95 FL — SIGNIFICANT CHANGE UP (ref 80–100)
NRBC # BLD: 0 /100 WBCS — SIGNIFICANT CHANGE UP (ref 0–0)
NRBC # BLD: 0 /100 WBCS — SIGNIFICANT CHANGE UP (ref 0–0)
NRBC BLD-RTO: 0 /100 WBCS — SIGNIFICANT CHANGE UP (ref 0–0)
NRBC BLD-RTO: 0 /100 WBCS — SIGNIFICANT CHANGE UP (ref 0–0)
PHENYTOIN FREE SERPL-MCNC: 3.5 UG/ML — LOW (ref 10–20)
PHOSPHATE SERPL-MCNC: 4.1 MG/DL — SIGNIFICANT CHANGE UP (ref 2.5–4.5)
PHOSPHATE SERPL-MCNC: 5.1 MG/DL — HIGH (ref 2.5–4.5)
PLATELET # BLD AUTO: 187 K/UL — SIGNIFICANT CHANGE UP (ref 150–400)
PLATELET # BLD AUTO: 198 K/UL — SIGNIFICANT CHANGE UP (ref 150–400)
POTASSIUM SERPL-MCNC: 4.1 MMOL/L — SIGNIFICANT CHANGE UP (ref 3.5–5.3)
POTASSIUM SERPL-MCNC: 4.6 MMOL/L — SIGNIFICANT CHANGE UP (ref 3.5–5.3)
POTASSIUM SERPL-SCNC: 4.1 MMOL/L — SIGNIFICANT CHANGE UP (ref 3.5–5.3)
POTASSIUM SERPL-SCNC: 4.6 MMOL/L — SIGNIFICANT CHANGE UP (ref 3.5–5.3)
RBC # BLD: 3.19 M/UL — LOW (ref 4.2–5.8)
RBC # BLD: 3.28 M/UL — LOW (ref 4.2–5.8)
RBC # FLD: 13.3 % — SIGNIFICANT CHANGE UP (ref 10.3–14.5)
RBC # FLD: 13.8 % — SIGNIFICANT CHANGE UP (ref 10.3–14.5)
SODIUM SERPL-SCNC: 135 MMOL/L — SIGNIFICANT CHANGE UP (ref 135–145)
SODIUM SERPL-SCNC: 139 MMOL/L — SIGNIFICANT CHANGE UP (ref 135–145)
WBC # BLD: 5.29 K/UL — SIGNIFICANT CHANGE UP (ref 3.8–10.5)
WBC # BLD: 7.22 K/UL — SIGNIFICANT CHANGE UP (ref 3.8–10.5)
WBC # FLD AUTO: 5.29 K/UL — SIGNIFICANT CHANGE UP (ref 3.8–10.5)
WBC # FLD AUTO: 7.22 K/UL — SIGNIFICANT CHANGE UP (ref 3.8–10.5)

## 2025-01-26 PROCEDURE — 99233 SBSQ HOSP IP/OBS HIGH 50: CPT

## 2025-01-26 PROCEDURE — 99232 SBSQ HOSP IP/OBS MODERATE 35: CPT

## 2025-01-26 RX ORDER — FOSPHENYTOIN SODIUM 50 MG/ML
1500 INJECTION INTRAMUSCULAR; INTRAVENOUS ONCE
Refills: 0 | Status: COMPLETED | OUTPATIENT
Start: 2025-01-26 | End: 2025-01-26

## 2025-01-26 RX ORDER — LORAZEPAM 4 MG/ML
4 VIAL (ML) INJECTION ONCE
Refills: 0 | Status: DISCONTINUED | OUTPATIENT
Start: 2025-01-26 | End: 2025-01-26

## 2025-01-26 RX ORDER — PHENYTOIN 100 MG/4ML
150 SUSPENSION, ORAL (FINAL DOSE FORM) ORAL
Refills: 0 | Status: DISCONTINUED | OUTPATIENT
Start: 2025-01-27 | End: 2025-03-13

## 2025-01-26 RX ORDER — LORAZEPAM 4 MG/ML
2 VIAL (ML) INJECTION ONCE
Refills: 0 | Status: DISCONTINUED | OUTPATIENT
Start: 2025-01-26 | End: 2025-01-26

## 2025-01-26 RX ADMIN — Medication 40 MILLIGRAM(S): at 11:47

## 2025-01-26 RX ADMIN — IPRATROPIUM BROMIDE AND ALBUTEROL SULFATE 3 MILLILITER(S): .5; 2.5 SOLUTION RESPIRATORY (INHALATION) at 05:38

## 2025-01-26 RX ADMIN — Medication 1 APPLICATION(S): at 19:45

## 2025-01-26 RX ADMIN — ERYTHROMYCIN 1 APPLICATION(S): 5 OINTMENT OPHTHALMIC at 22:00

## 2025-01-26 RX ADMIN — BACLOFEN 5 MILLIGRAM(S): 10 INJECTION INTRATHECAL at 05:40

## 2025-01-26 RX ADMIN — FLUTICASONE PROPIONATE 1 SPRAY(S): 50 SPRAY, METERED NASAL at 19:17

## 2025-01-26 RX ADMIN — Medication 1 DROP(S): at 22:00

## 2025-01-26 RX ADMIN — Medication 2 MILLIGRAM(S): at 16:58

## 2025-01-26 RX ADMIN — Medication 1 DROP(S): at 11:03

## 2025-01-26 RX ADMIN — Medication 15 MILLILITER(S): at 19:12

## 2025-01-26 RX ADMIN — Medication 1 DROP(S): at 19:12

## 2025-01-26 RX ADMIN — HEPARIN SODIUM 5000 UNIT(S): 1000 INJECTION INTRAVENOUS; SUBCUTANEOUS at 05:39

## 2025-01-26 RX ADMIN — BACLOFEN 5 MILLIGRAM(S): 10 INJECTION INTRATHECAL at 19:12

## 2025-01-26 RX ADMIN — LEVETIRACETAM 1500 MILLIGRAM(S): 10 INJECTION, SOLUTION INTRAVENOUS at 19:12

## 2025-01-26 RX ADMIN — LEVETIRACETAM 1500 MILLIGRAM(S): 10 INJECTION, SOLUTION INTRAVENOUS at 05:40

## 2025-01-26 RX ADMIN — ACETYLCYSTEINE 4 MILLILITER(S): 200 INHALANT RESPIRATORY (INHALATION) at 11:47

## 2025-01-26 RX ADMIN — Medication 200 MILLIGRAM(S): at 19:45

## 2025-01-26 RX ADMIN — IPRATROPIUM BROMIDE AND ALBUTEROL SULFATE 3 MILLILITER(S): .5; 2.5 SOLUTION RESPIRATORY (INHALATION) at 11:50

## 2025-01-26 RX ADMIN — ACETYLCYSTEINE 4 MILLILITER(S): 200 INHALANT RESPIRATORY (INHALATION) at 19:13

## 2025-01-26 RX ADMIN — AMLODIPINE BESYLATE 5 MILLIGRAM(S): 10 TABLET ORAL at 05:40

## 2025-01-26 RX ADMIN — OFLOXACIN 1 DROP(S): 3 SOLUTION OPHTHALMIC at 11:51

## 2025-01-26 RX ADMIN — Medication 125 MILLIGRAM(S): at 03:57

## 2025-01-26 RX ADMIN — Medication 15 MILLILITER(S): at 05:39

## 2025-01-26 RX ADMIN — HEPARIN SODIUM 5000 UNIT(S): 1000 INJECTION INTRAVENOUS; SUBCUTANEOUS at 22:00

## 2025-01-26 RX ADMIN — Medication 1 DROP(S): at 02:00

## 2025-01-26 RX ADMIN — HEPARIN SODIUM 5000 UNIT(S): 1000 INJECTION INTRAVENOUS; SUBCUTANEOUS at 14:51

## 2025-01-26 RX ADMIN — Medication 60 MILLILITER(S): at 19:28

## 2025-01-26 RX ADMIN — ACETYLCYSTEINE 4 MILLILITER(S): 200 INHALANT RESPIRATORY (INHALATION) at 05:38

## 2025-01-26 RX ADMIN — OFLOXACIN 1 DROP(S): 3 SOLUTION OPHTHALMIC at 19:12

## 2025-01-26 RX ADMIN — IPRATROPIUM BROMIDE AND ALBUTEROL SULFATE 3 MILLILITER(S): .5; 2.5 SOLUTION RESPIRATORY (INHALATION) at 19:13

## 2025-01-26 RX ADMIN — FLUTICASONE PROPIONATE 1 SPRAY(S): 50 SPRAY, METERED NASAL at 05:38

## 2025-01-26 RX ADMIN — Medication 1 APPLICATION(S): at 05:41

## 2025-01-26 RX ADMIN — Medication 1 DROP(S): at 05:39

## 2025-01-26 RX ADMIN — Medication 1 DROP(S): at 14:53

## 2025-01-26 RX ADMIN — FOSPHENYTOIN SODIUM 160 MILLIGRAM(S) PE: 50 INJECTION INTRAMUSCULAR; INTRAVENOUS at 17:23

## 2025-01-26 RX ADMIN — Medication 1 SPRAY(S): at 19:13

## 2025-01-26 RX ADMIN — Medication 2 MILLIGRAM(S): at 16:53

## 2025-01-26 RX ADMIN — Medication 1 APPLICATION(S): at 05:39

## 2025-01-26 RX ADMIN — Medication 4 MILLIGRAM(S): at 17:07

## 2025-01-26 RX ADMIN — Medication 125 MILLIGRAM(S): at 11:51

## 2025-01-26 RX ADMIN — Medication 1 SPRAY(S): at 05:37

## 2025-01-26 RX ADMIN — OFLOXACIN 1 DROP(S): 3 SOLUTION OPHTHALMIC at 05:37

## 2025-01-26 RX ADMIN — DOXAZOSIN MESYLATE 8 MILLIGRAM(S): 8 TABLET ORAL at 22:00

## 2025-01-26 NOTE — PROGRESS NOTE ADULT - SUBJECTIVE AND OBJECTIVE BOX
EPILEPSY PROGRESS NOTE:  Pt seen at bedside, trached. denies any pain based on eye closure in response. Able to respond with eye closure (no) or raising eyebrows (yes). Still notable for R eye and mouth twitching.      REVIEW OF SYSTEMS:  Unable to obtain- trached and nonverbal    MEDICATIONS:   MEDICATIONS  (STANDING):  acetylcysteine 10%  Inhalation 4 milliLiter(s) Inhalation every 6 hours  albuterol/ipratropium for Nebulization 3 milliLiter(s) Nebulizer every 6 hours  amLODIPine   Tablet 5 milliGRAM(s) Oral daily  artificial tears (preservative free) Ophthalmic Solution 1 Drop(s) Right EYE every 4 hours  baclofen 5 milliGRAM(s) Oral every 12 hours  chlorhexidine 0.12% Liquid 15 milliLiter(s) Oral Mucosa every 12 hours  chlorhexidine 2% Cloths 1 Application(s) Topical <User Schedule>  doxazosin 8 milliGRAM(s) Oral at bedtime  erythromycin   Ointment 1 Application(s) Right EYE at bedtime  fluticasone propionate 50 MICROgram(s)/spray Nasal Spray 1 Spray(s) Both Nostrils two times a day  heparin   Injectable 5000 Unit(s) SubCutaneous every 8 hours  influenza   Vaccine 0.5 milliLiter(s) IntraMuscular once  levETIRAcetam 1500 milliGRAM(s) Oral two times a day  ofloxacin 0.3% Solution 1 Drop(s) Right EYE four times a day  pantoprazole  Injectable 40 milliGRAM(s) IV Push daily  petrolatum Ophthalmic Ointment 1 Application(s) Both EYES two times a day  phenytoin   Suspension 200 milliGRAM(s) Oral <User Schedule>  phenytoin   Suspension 125 milliGRAM(s) Oral <User Schedule>  propranolol 5 milliGRAM(s) Oral every 12 hours  sodium chloride 0.65% Nasal 1 Spray(s) Both Nostrils two times a day    MEDICATIONS  (PRN):  acetaminophen   Oral Liquid .. 650 milliGRAM(s) Oral every 6 hours PRN Temp greater or equal to 38C (100.4F), Mild Pain (1 - 3)      VITAL SIGNS:  Vital Signs Last 24 Hrs  T(C): 36.4 (26 Jan 2025 12:56), Max: 37.3 (25 Jan 2025 22:28)  T(F): 97.6 (26 Jan 2025 12:56), Max: 99.1 (25 Jan 2025 22:28)  HR: 68 (26 Jan 2025 12:00) (68 - 88)  BP: 118/81 (26 Jan 2025 12:00) (118/81 - 138/96)  BP(mean): 95 (26 Jan 2025 12:00) (95 - 112)  RR: 14 (26 Jan 2025 12:00) (14 - 16)  SpO2: 100% (26 Jan 2025 12:00) (96% - 100%)    Parameters below as of 26 Jan 2025 12:00  Patient On (Oxygen Delivery Method): ventilator    O2 Concentration (%): 40    PHYSICAL EXAM:  Constitutional: Appearance is consistent with chronologic age. No acute distress  Cardiovascular: Regular rate and rhythm, no murmurs, rubs, or gallops. Extremities are warm and well perfused. Capillary refill is less than 2 seconds.   Pulmonary:  trach in place connected to vent  GI: Positive bowel sounds. Abdomen soft, non-distended, non-tender. No guarding or rebound.   Extremities: No significant deformity or joint abnormality. Radial and DP pulses +2, No edema.  Skin: normal color, texture and turgor with no lesions or eruptions.    Neurologic:  -Mental status: Awake, alert, at times.    + R eye and moth twitching. Withdraws to pain B/L UE    LABS:  CBC Full  -  ( 26 Jan 2025 05:30 )  WBC Count : 7.22 K/uL  RBC Count : 3.19 M/uL  Hemoglobin : 10.0 g/dL  Hematocrit : 30.3 %  Platelet Count - Automated : 187 K/uL  Mean Cell Volume : 95.0 fl  Mean Cell Hemoglobin : 31.3 pg  Mean Cell Hemoglobin Concentration : 33.0 g/dL  Auto Neutrophil # : x  Auto Lymphocyte # : x  Auto Monocyte # : x  Auto Eosinophil # : x  Auto Basophil # : x  Auto Neutrophil % : x  Auto Lymphocyte % : x  Auto Monocyte % : x  Auto Eosinophil % : x  Auto Basophil % : x    01-26    135  |  99  |  37[H]  ----------------------------<  98  4.1   |  24  |  1.06    Ca    9.2      26 Jan 2025 05:30  Phos  4.1     01-26  Mg     2.1     01-26    TPro  x   /  Alb  3.0[L]  /  TBili  x   /  DBili  x   /  AST  x   /  ALT  x   /  AlkPhos  x   01-26    LIVER FUNCTIONS - ( 26 Jan 2025 05:30 )  Alb: 3.0 g/dL / Pro: x     / ALK PHOS: x     / ALT: x     / AST: x     / GGT: x             Phenytoin Level, Serum: 3.5 ug/mL (01-26 @ 05:30)  Phenytoin Level, Serum: 4.6 ug/mL (01-25 @ 05:30)

## 2025-01-26 NOTE — PROGRESS NOTE ADULT - ASSESSMENT
44M with unclear PMH (?stroke, MI) who was found down at work, intubated for airway protection and found to have acute parenchymal hemorrhage within nabil with mass effect (+ acute/subacute right cerebellar infarct) in setting of hypertensive emergency, transferred to [facility] for further neurosurgical care. Hospital course c/b poor neurologic recovery s/p trach-PEG, AUR s/p gabriel, ANIKA on CKD c/b hyperkalemia, HAP s/p amp-sulbactam (EOT 10/23), K aerogenes bacteremia  treated with 2 weeks of Cefepime per ID , On 11/15 he became septic and was transferred to NSICU due to increased o2 requirements and needed Vent support , Trach Cultures + for Stenotrophomonas which was treated with 7 days of Bactrim per ID , has been weaned of Vent since 11/23 and is now on 10lts at 40% o2 through Trach Collar. Stepped up to the NSICU on 12/15 for hypoxia and tachycardia. PE ruled out. On abx for 5 days. Unclear etiology. Now stepped down to 8Lach.    # Pontine hemorrhage  # Encephalopathy due to Intracranial Bleed   - Acute parenchymal hemorrhage within nabil with mass effect (+ acute/subacute right cerebellar infarct) in setting of hypertensive emergency.   - MRI brain also demonstrated acute/subacute R cerebellar stroke.   - No Neurosurgical Interventions were performed   - Secondary to IPH and cerebellar stroke patient has become Trach and PEG Dependent    # Seizures  - Continue Seizure precautions   - Continue Keppra and phenytoin  -Epilepsy following; Will continue to monitor corrected phenytoin level     # Hypertension  - Continue amlodipine 5mg daily with holding parameters  - Will continue to monitor BP and uptitrate antihypertensive as required     # Chronic respiratory failure.   - s/p percutaneous trach by pulm on 10/14/24  - Currently on Vent Support   - Continue Chest PT    # Neurogenic dysphagia.   - Pt s/p PEG with surgery 10/21/24  - TF per nutrition  - aspiration precautions and elevation of HOB.    #Acute urinary retention.   - doxazosin 8mg  - continue to monitor urine oupatient     # Functional quadriplegia in setting of brainstem hemorrhage  - decub precautions  - care per nursing protocol     # DVT prop: Heparin SQ q8  Discussed with primary team     55 minutes spent on total encounter. The necessity of the time spent during the encounter on this date of service was due to:    Review of hospital course, labs, vitals, medical records.  Bedside exam and interview of the patient  Discussed plan of care with primary team   Documenting the encounter.

## 2025-01-26 NOTE — PROGRESS NOTE ADULT - NS ATTEND OPT1 GEN_ALL_CORE
I attest my time as attending is greater than 50% of the total combined time spent on qualifying patient care activities by the PA/NP and attending.
I independently performed the documented:
I attest my time as attending is greater than 50% of the total combined time spent on qualifying patient care activities by the PA/NP and attending.
I attest my time as attending is greater than 50% of the total combined time spent on qualifying patient care activities by the PA/NP and attending.

## 2025-01-26 NOTE — PROVIDER CONTACT NOTE (CHANGE IN STATUS NOTIFICATION) - ASSESSMENT
noted pt has right lower eyelid twiching and R side lip twiching continously. pupil 3 sluggish, has trace w/d on all 4 extremities. received 8mg ativan early for possible seizures around 4pm. pt is currently hard to arouse, obtunded possible ativan related. temp was done, 97.8 oral. HR 77, /65 RR 14 on FiO2 40%. RAMONA Anderson at bedside.

## 2025-01-26 NOTE — PROGRESS NOTE ADULT - ASSESSMENT
44 year-old-male with unclear PMH (?stroke, MI) who was found down at work, intubated for airway protection and found to have acute large parenchymal hemorrhage within nabil with mass effect (+ acute/subacute right cerebellar infarct) in setting of hypertensive emergency, and R cerebellar stroke transferred to [facility] for further neurosurgical care. 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 Epilepsy recalled as pt with vertical eye movements that appeared rhythmic and resolved after given AM Keppra +additional 500mg. Dilantin level 3.3. High suspicion for continued focal seizures, however given location of the bleed, suspect that repeating EEG will have limited utility. Unclear if EEG negative for focal seizures or hemifacial spasm is from pontine irritation. Given low therapeutic level, will recommend increasing Dilantin and monitoring clinically for improvement.    Recommendations:  - Continue Keppra 1500mg Q12hrs  - Increase Phenytoin to 150/150/200mg   - Maintain seizure/fall/aspiration precautions      Discussed with Attending Dr. Farley.

## 2025-01-26 NOTE — PROGRESS NOTE ADULT - SUBJECTIVE AND OBJECTIVE BOX
Patient was seen and examined at bedside. Case discuss with the primary team. Pt w/o any events overnight.     OBJECTIVE:  Vital Signs Last 24 Hrs  T(C): 36.2 (26 Jan 2025 09:28), Max: 37.3 (25 Jan 2025 22:28)  T(F): 97.2 (26 Jan 2025 09:28), Max: 99.1 (25 Jan 2025 22:28)  HR: 74 (26 Jan 2025 08:41) (72 - 88)  BP: 120/78 (26 Jan 2025 05:40) (120/78 - 137/95)  BP(mean): 95 (26 Jan 2025 05:40) (95 - 111)  RR: 14 (26 Jan 2025 08:41) (14 - 16)  SpO2: 97% (26 Jan 2025 08:41) (96% - 100%)    Parameters below as of 26 Jan 2025 08:41  Patient On (Oxygen Delivery Method): ventilator    O2 Concentration (%): 40    PHYSICAL EXAM:  General: minimally responsive on ventilator, NAD, no labored breathing   HEENT: trach collar in place, clean  Lungs: anterior exam, limited, no crackles, no wheezes  Heart: regular  Abdomen: soft, no distension, + BS  Extremities:  warm, trace edema, not following commands       LABS:                        10.0   7.22  )-----------( 187      ( 26 Jan 2025 05:30 )             30.3     01-26    135  |  99  |  37[H]  ----------------------------<  98  4.1   |  24  |  1.06    Ca    9.2      26 Jan 2025 05:30  Phos  4.1     01-26  Mg     2.1     01-26    TPro  x   /  Alb  3.0[L]  /  TBili  x   /  DBili  x   /  AST  x   /  ALT  x   /  AlkPhos  x   01-26    LIVER FUNCTIONS - ( 26 Jan 2025 05:30 )  Alb: 3.0 g/dL / Pro: x     / ALK PHOS: x     / ALT: x     / AST: x     / GGT: x             Dilantin level 3.5  Corrected dilantin level 3.8   acetaminophen   Oral Liquid .. 650 milliGRAM(s) Oral every 6 hours PRN  acetylcysteine 10%  Inhalation 4 milliLiter(s) Inhalation every 6 hours  albuterol/ipratropium for Nebulization 3 milliLiter(s) Nebulizer every 6 hours  amLODIPine   Tablet 5 milliGRAM(s) Oral daily  artificial tears (preservative free) Ophthalmic Solution 1 Drop(s) Right EYE every 4 hours  baclofen 5 milliGRAM(s) Oral every 12 hours  chlorhexidine 0.12% Liquid 15 milliLiter(s) Oral Mucosa every 12 hours  chlorhexidine 2% Cloths 1 Application(s) Topical <User Schedule>  doxazosin 8 milliGRAM(s) Oral at bedtime  erythromycin   Ointment 1 Application(s) Right EYE at bedtime  fluticasone propionate 50 MICROgram(s)/spray Nasal Spray 1 Spray(s) Both Nostrils two times a day  heparin   Injectable 5000 Unit(s) SubCutaneous every 8 hours  influenza   Vaccine 0.5 milliLiter(s) IntraMuscular once  levETIRAcetam 1500 milliGRAM(s) Oral two times a day  ofloxacin 0.3% Solution 1 Drop(s) Right EYE four times a day  pantoprazole  Injectable 40 milliGRAM(s) IV Push daily  petrolatum Ophthalmic Ointment 1 Application(s) Both EYES two times a day  phenytoin   Suspension 200 milliGRAM(s) Oral <User Schedule>  phenytoin   Suspension 125 milliGRAM(s) Oral <User Schedule>  propranolol 5 milliGRAM(s) Oral every 12 hours  sodium chloride 0.65% Nasal 1 Spray(s) Both Nostrils two times a day

## 2025-01-26 NOTE — PROGRESS NOTE ADULT - NS ATTEND AMEND GEN_ALL_CORE FT
44M without known medical hx, found down at work, found to have large pontine hemorrhage with subarachnoid extension w edema and mass effect in setting of hypertensive emergency, transferred to [facility] for further neurosurgical care. Hospital course c/b poor neurologic recovery s/p trach-PEG, urinary retention s/p gabriel, ANIKA on CKD c/b hyperkalemia, HAP s/p amp-sulbactam (EOT 10/23). Former trach cx from 10/16 w A baumanii/nosocomialis. On 11/3, spiked fever of 101.9 with noted rise in WBC (10.9), CXR without infiltrates. Possibly w thick tracheal secretions. Was empirically started on Vanc/Piptazo to which he clinically responded, defervesced, WBC normalized.  Rapid RVP and SARS-CoV-2 PCR neg.  IV Vanco discontinued on 11/4 MRSA neg on MRSA/MSSA PCR. MSSA+.  SCx reported yest w S. maltophilia, S- levo, bactrim, kelsea.  Medicine consulted ID requesting opinion on whether to treat Steno/not.  Given that pt defervesced prior to Steno Rx initiation, and no CXR changes, Steno could represent tracheal colonization and recc continue w IV Piptazo 3.375 gm x 5-7 days for VA tracheitis.   Today 11/3 BCx resulted w 1/4 aerobic bottles +ve GNR pcr id'ed as K aerogenes.  - DC Piptazo  - Start IV Cefepime 2q12h renally adjusted  - Please repeat BCx today   - F/u BCx K aerogenes sensis, repeat BCx  Source could be urinary but pt has a gabriel and UA/UCx were not sent when febrile. Gabriel placed 10/27 per medicine team.  - Change gabriel  - CT A/P if within goals of care to r/o pyelo  - Rec revisiting goals of care    ID Team 2 will follow    Starting 11/7, Dr. Helms will assume care of this pt.
Agree with above.  Continue Cefepime 2 grams IV q12.  Follow up susceptibility. Follow up repeat blood cultures.  Provided patient clears right away and no other source identified on CT abd/pelvis, anticipate a 7 day course of antibiotics from blood culture clearance
Agree with above. Patient with Klebsiella aerogenes bacteremia. Likely secondary to UTI.  Blood cultures have cleared.  Can complete a 7 day course of cefepime (ends 11/12/24).  ID team 2 will sign off.  Reconsult as needed
When rounding, RN who last worked with him weeks ago noted significant improvement in his responsivity and ability to respond yes/no with eyebrow raises and blinks.  This is with phenytoin increasing, but difficulty getting level to therapeutic.    Later in the evening, noted to be less repsonsive, foaming and perhaps more twitching, but not sure.  Video sent showed about the same amount of twitching.  However, still reasonable to attempt to control with dilantin at a therapeutic level with IV fosphenytoin to test whether it will be effective and may help with identifying whether a seizure or non-cerebral myoclonus.    Awaiting level.
Patient seen, chart reviewed, case discussed in team. I saw the patient with NP, discussed case and management, and have reviewed the note with NP. I have made changes to significant parts of the note including HPI, MSE, Assessment/plan and agree with not in its current form.     Patient with prolonged hospitalization, bedbound, functionally quadriplegic, respiratory failure with trache/PEG.  Pontine hemorrhage, with minimal hope of meaningful recovery.   DNR. Palliative care to remain available if further goals of care discussions need to occur.
Patient seen, chart reviewed, case discussed in team. I saw the patient with NP, discussed case and management, and have reviewed the note with NP. I have made changes to significant parts of the note including HPI, MSE, Assessment/plan and agree with not in its current form.     Patient with prolonged hospitalization, pontine hemorrhage, respiratory failure, trache/PEG.  Palliative care following for goals of care discussions.  Please reference GOC as above.  Decision made for DNR.  Otherwise, care to continue as before.

## 2025-01-26 NOTE — PROGRESS NOTE ADULT - SUBJECTIVE AND OBJECTIVE BOX
HPI:  Unknown age male, unknown past medical history (reported stroke and MI by coworkers) presented to [facility] with AMS, Pt was working at [employer] when he was found down by coworkers. EMS called and pt brought to [facility] ED. Intubated, sedated, started on cardene for SBPs in 200s. CT head showed brain stem bleed. Transferred to NSICU for further management.  (30 Sep 2024 12:55)      Subjective:  GEORGE overnight.    Hospital course:  9/30: transferred from [facility]. A line placed. Versed dc'd. Cy Rader at bedside, states pt has family in [place], cannot confirm medications or PMH other than stroke and MI. 250cc bolus 3% given. LR switched to NS. hydralazine 25q8 started, 3% started, switched propofol to precedex   10/1: stability CTH done. Added labetalol, started TF. Palliative consulted. ethics consulted to determine surrogate. febrile 103, pan cx sent  10/2: BD 2, GEORGE overnight. TF resumed. Desatt'd to 80s, FiO2 inc. to 50. Fentanyl given, ABG, CXR ordered. Maxxed on precedex, started on propofol for DARIEN -4 - -5. Precedex dc'd. Duonebs, mucomyst, hypertonic added. 3% dc'd. Cardene dc'd. Start vanc/CTX. Increased labetalol 200q8. MRSA negative, dc'd vanc. ETT pulled back 2cm x 2, good positioning after confirmatory chest xray. Ethics attempting to establish HCP with family. Na 159, starting FW 250q6 for range 150-155.   10/3: BD3, GEORGE o/n, neuro stable. Na elevating, FW increased to 300q6. Dc'd bowel reg for diarrhea. vEEG started. SQH 5000q8 tonight.   10/4: BD 4, albumn bolus, incr. LR to 80 2/2 incr. in Cr, LR to 10 0cc/hr for uptrending Cr. Started 7% hypersal for 48hrs and SL atropine for inline/oral thick secretions. Dc'd CTX and started ancef for MSSA in the sputum. Nephrology consulted for CKD, f/u recs. SBP 170s, given hydralazine 10mg IVP.   10/5: BD5, o/n 10mg IVP hydralazine given for SBP 170s and started on hydralazine 25q8 via OGT. 10mg IV push labetalol for SBP > 160s. RT placed for diarrhea.   10/6: BD6, o/n FW increased to 350q4 per nephrology recs. IV tylenol for temp 100.6, SBp 160s presumed uncomfortable.   10/7: BD7, overnight pancultured for temp 101.8F.   10/8: BD8. GEORGE. Cr bumped. decreased LR to 75cc/hr. Adding simethicone ATC. incr hydralazine 50mgTID. Incr labetalol 300mgTID. Na 145, decreased FWF to 250q6. Start precedex. FENa consistent with intrinsic kidney injury. Pend repeat renal US. Retaining up to 1.3L, bladder scans q6, straight cath PRN  10/9: BD 9. GEORGE overnight. Neuro stable. abd xray for distention w non-specific gas pattern, OGT to LIWS for morning. duonebs/mucomyst to q8 for improving secretions. Changed tube feeds to Jevity 1.5 20cc/hr, low rate due to abdominal distention, nepro dense and more difficult to digest. Tolerating CPAP, confirmed by ABG.   10/10: BD 10. GEORGE overnight. Neuro stable. (+) gabriel for urinary retention on bladder scan. inc TF to goal rate of 40cc/hr. family leaning toward pursuing trach/PEG. 1/2 amp for FS 81.   10/11: BD 11. GEORGE overnight. Neuro stable. Trach/PEG consults placed.   10/12: BD 12. GEORGE overnight. Neuro stable. MRI brain complete.   10/13: BD 13. Increase flomax. Hold SQH after PM dose for trach tm. IVL.   10/14: BD 14. GEORGE overnight, remains on AC/VC. Gabriel placed for urinary retention. Dc'd free water.  S/p trach with pulm. NGT placed and CXR confirmed in good position.   10/15: BD 15, GEORGE ovn. resumed feeds. spiked 101, pan cx sent.   10/16: BD 16. GEORGE ovn. Lokelma 5mg for K+ 5.4. Started vanc q 24/zosyn for empiric PNA coverage, IVF to 100/hr. PEG held for fever.   10/17: BD 17,  ordered serum osm and urine osm for am. Started sinemet for neurostimulation. Increased cardura to 0.8. Started FW 100q4, dc'd IVF. MRSA negative, dc'd vanc. NGT replaced d/t coiling.   10/18: BD 18, GEORGE overnight, neuro stable. Amantadine added for neurostim. zosyn changed to unasyn for acinetobacter baumannii, failed TOV and required SC  10/19: BD 19, GEORGE ovn. cardura 2mg added for retention. labetalol decreased 200q8, hydralazine decreased 25q8. Gabriel replaced.   10/20: BD20, GEORGE overnight. NGT dislodged, replaced. PEG tomorrow w/ gen surg, FW increased to 150q4 and labetalol decreased to 100q8, lokelma given for hyperkalemia.   10/21: BD 21. POD0 PEG placement with Gen surg. decr labetolol to 50q8, incr. cardura to 0.4, started lokelma and phoslo, dc gabriel POD0 PEG placement with Gen surg.  10/22: BD 22. Plan to start TF today via PEG. dc labetalol, Following ophtho recs. Increased apnea settings - found to be in cheyne-moe respiration. CPAP 5/5.  10/23: BD 23. hydralazine d/c'd, trach collar trial today. Rectal tube placed at 6am.  10/24: BD24, o/n lokelma held due to diarrhea. Free water 100q6 resumed. dc'd tamsulosin, amantadine. Incr'd cardura to 8mg qhs. Dc'd FW. Switched jevity to nepro. gabriel placed for high urine output. Started SL atropine for oral secretions. Dc'd free water.  10/25: BD25, o/n decreased suctioning requirements to > q4hrs, GEORGE. Cr improving, cont phoslo, lokelma held at this time. Gabriel placed yest, cont. Tolerating trach collar. Given 500cc plasmalyte bolus for ANIKA. Dc'd sinemet.   10/26: BD26, o/n resumed lokelma 5mg daily and resumed 100cc free water q6hrs. Change in neuro status with new right pupillary dilation with anisocoria (right pupil 6mm fixed and left pupil 3mm briskly reactive). Given 23.4% NaCl bullet, taken for emergent CTH showing mostly resolved pontine hemorrhage, continued brainstem hypodensity likely edema d/t hemorrhage, no new hemorrhage or infarct, no herniation, mild increase in size of left lateral ventricle. Vitals remaine stable. Na goal > 140.   10/27: BD27, o/n GEORGE.Neuro stable. Pend stepdown with airway bed.   10/28: BD 28. GEORGE overnight. Neuro stable. Miralax ordered. Gabriel removed, pending TOV.  10/29: BD 29. GEORGE o/n. Given 2L NS over 8 hrs for increased BUN/Cr ratio. Gabriel placed for frequent straight cath.   10/30: BD 30.   10/31: BD 31. GEORGE overnight. Na 149, increased free water to 200q6. 1L NS for uptrending BUN.   11/1: BD 32. GEORGE overnight. Given 1L NS for dehydration. Na 146, increased FW to 250q6.   11/2: BD 33 GEORGE overnight, neuro stable, given 500cc bolus for net negative status and tachycardia   11/3: BD 34, GEORGE overnight, neuro stable. Patient remains tachycardic, EKG showing sinus tachycardia, given additional 500cc NS bolus. Febrile to 101.9F, pan cultured (without UA), CXR WNL, given tylenol.   11/4: BD 35, GEORGE overnight, neuro stable. Given 1L NS for tachycardia. sputum (+) for stenotropohomas maltophilia.   11/5: BD 36 GEORGE overnight, neuro stable. Vancomycin dc'd. Chest PT BID. ID consulted, cont zosyn.  11/6: BD 37. blood cx + klebsiella dc zosyn changed to cefepime, CTAP ordered, rpt blood cx sent.    11/7: BD 38. Pending CT A/P, given 250cc bolus and starting maintenance fluids overnight. Pending CT A/P after bolus   11/8: BD 39. CT CAP negative for infection.   11/9: BD 40. GEORGE overnight.  11/10: BD 41. GEORGE overnight. desat to 85 on trach collar, O2 inc to 10L and 100%, O2 sat inc to 95. pt tachy to 110s, euvolemic. given tylenol. ABG and CXR ordered. spiked fever, pancultured, RVP negative. AM ABG w pO2 79, rpt w pO2 79. pt appears comfortable, satting 94%.   11/11: BD 42. GEORGE overnight. pt became tachy to 130s, desat to 90 on 100% FiO2 and 10L. suctioned, (+) productive cough. temp 101.4, given 1g IV tylenol and 500cc NS bolus for euvolemia. fever and HR downtrending. LE dopplers negative for dvt  11/12: BD 43, GEORGE ovn, fever and HR downtrending, satting 97% 70% FIO2  11/13: BD 44, GEORGE ovn. started standing tylenol x24 hours for tachycardia. desat to 80s, o2 increased. CXR stable, pending CTA PE protocol.   11/14: BD 45, GEORGE overnight, neuro stable. resp therapy dec FiO2 to 70%.   11/15: BD 46, GEORGE overnight, neuro stable.  Rapid called for desaturation 30s, tachycardic 140s. Patient bagged, 100% fio2, heavily suctioned. CXR/POCUS unremarkable. ABG c/w desaturation. WBC 14.71. Afebrile. O2 improved to 90s and patient upgraded to ICU. ABG paO2 30s improved to 89 on vent. IV Tylenol x 1, sputum sent. Start protonix while o-n vent.   11/16: BD 47. POCUS showed collapsable IVCF, given 1L bolus. Vanco/Cefpime added empriic for PNA, NGT feeds restarted. MRSA swab neg, Vanc DC'd.   11/17: BD 48. GEORGE overnight. 1l bolus for tachycardia. Spiked to 101, cultured. 500cc bolus for tachycardia, tachy to 148 given 25mcg fentanyl, 250cc albumin, 1.5L bolus. 5 IV lopressor with response HR to 100s. +Stenotrophomonas on sputum cx.   11/18: BD 49. GEORGE overnight. Consulted ID, cefepime switched to bactrim x7days. Started hydrochlorothiazine 12.5mg daily.  11/19: BD 50. Tachy 120s, given tylenol and 500cc NS. Tolerating 5/5, switched to TCx. Holding phos binder. D/c Bactrim. D/c gabriel, f/u TOV. Dc'd PPI.   11/20: BD 51. GEORGE ovn. 1600 satting low 90s, mildly tachy to 110s, afebrile, RR wnl. O2 improved to mid 90s while inc O2 to 100% on TCx. CPAP 5/5 placed back on.  11/21: BD 52, GEORGE ovn, tolerating CPAP 5/5. Switch to trach collar during the day if tolerating well. HCTZ held for Cr bump, straight cath frequence increased to q4  11/22: BD 53, GEORGE ovn. Resumed phoslo. Gabriel placed. Resumed HCTZ.   11/23: BD 54. Holding tylenol in setting of possible fever, will require pan cx if febrile. Cr improved today. Cont CPAP. Bowel regimen held i/s/o diarrhea. FOBT negative.  11/24: BD 55. GEORGE overnight. Neuro stable. HCTZ dc'ed, started lisinopril 5. Lokelma dc'ed for K 3.7.   11/25: BD 56, GEORGE overnight. Neuro stable. dc'd lisinopril 5mg. Gabriel dc'd. TOV. 1545 noted to be hypotensive, MAP 50, in supine position on chair, HR 60s, afebrile, O2 96%. Given 1L cc NS bolus, placed back on bed in reverse trendelenberg, improved to map of 66. Neostick at bedside. Vitals check q1h. Dc'ed amlodipine. Failed TOV, bladder scan q6, sc prn. Added back Senna.   11/26: BD 57, GEORGE overnight. Neuro stable. Dc'd phoslo.   11/27: BD 58, GEORGE overnight. Neuro stable.    11/28: BD 59. Gabriel replaced.   11/29: GEORGE.  11/30: GEORGE, neuro stable.   12/1: BD 62, GEORGE overnight  12/2: BD 63, GEORGE overnight.; Given 1L bolus of LR for uptrending BUN/Cr.  12/3: BD 64. Reinstated eye gtt/moisture chamber given increased Rt eye injection  12/4: BD 65. GEORGE overnight. Attempted to speak with ophtho regarding eyelid weight/closure but no answer, full mailbox.   12/5: BD 66, GEORGE overnight, bowel regimen increased and had BM.   12/6: BD 67, GEORGE overnight, neuro stable.  12/7: GEORGE overnight, neuro stable. /110s, given x1 hydralazine 10 mg IVP. Restarted home amlodipine 5mg.  12/8: GEORGE. OOB to chair.     12/11: GEORGE, mucomyst added for thick secretions, simethicone for abd distension, abd xray with stool burden, increased bowel regimen.   12/12: GEORGE overnight.   12/13: GEORGE overnight.   12/14: GEORGE overnight  12/15: o/n Patient became tachycardic HR 120s and 10 minutes later O2 sat dropped as low as 89%. Patient suctioned without improvement in O2 sat and tube feeds found in suction catheter. TFs held.  STAT CXR ordered. STAT labs sent. Respiratory therapist called to bedside and patient trach connected to ventilator. After connection to ventilator and further suctioning O2 sat improved to 97% but patient HR remains 120-130s. Upgraded to NSICU for further management. Vancomycin and zosyn started. CTA  chest PE protocol and CTH ordered. Blood cultures sent.given 500cc bolus, rpt ABG sent pO2 243, CTH and CTA chest done. FS while NPO, FiO2 dec 50 pending ABG. sputum cx positive for few GPC and GNR.   12/16: GEORGE overnight, restarted amlodipine, troponin 75   12/17: GEORGE overnight, neuro stable. Attempted CPAP this morning, did not tolerate and back on full vent support. Dc'd Vanc. Tachycardia to 140s, noted extremities to be twitching along with jaw twitching. Given 2g of Keppra, total 4mg ativan and placed on EEG, full set of labs and lactate negative. Resumed trickle feeds at 20cc/hr. Given 250cc albumin for tachycardia.   12/18: GEORGE overnight. Neuro stable. CTH stable. EEG negative and dc'd.   12/19: GEORGE overnight. neuro stable. SIMV most of day, AC/VC at night.   12/20: GEORGE overnight, neuro stable. remains on VC/AC  12/21: GEORGE ovn. 1L bolus for tachy to 120s-130s.   12/22: GEORGE ovn. Tachy to 120s, given tylenol and IVF. 2mg IV ativan given for L jaw twitching. Sx resolved for 3 minutes and started again. Epilepsy contacted. Given 2mg IV ativan. Continued twitching. Increased keppra to 1500mg BID, 2mg ativan given. Propranolol started for refractory tachycardia. vEEG ordered. albumin given. POCUS performed, no b lines. Started on fosphenytoin, loaded w 20mg/kg then 100mg TID. febrile, pancx, started cefepime 2g q8, vancomycin  12/23: GEORGE ovn. +focal motor seizures on eeg. ID consulted. Vancomycin dc'ed as per ID.  12/24: GEORGE ovn, neuro stable. Baclofen 5 mg q12 started for hiccups. Na 129 from 134. Urine lytes c/w SIADH. Given 250cc 3% bolus. CT chest for infection w/u - f/u read. Repeat Na 136. UA negative  12/25: GEORGE ovn.   12/26: GEORGE ovn  12/27: GEORGE overnight. Blood cx neg @ 4 days, DC cefepime per medicine (sputum colonized).   12/28: Desaturation o/n to 80's, CXR obtained, pulse ox changed and sats resolved. Na 133 from 138, 250cc 3% bolus given. Cefepime resumed until 12/30 per ID. Rpt Na 138.  12/29: GEORGE overnight. Na stable.   12/30: GEORGE overnight. Family meeting with son, pt now DNR.   12/31: GEORGE overnight.   1/1: GEORGE overnight, neuro stable.  1/2: GEORGE overnight, neuro stable. FW decreased to 100q6.   1/3: GEORGE overnight, neuro stable. fosphenytoin IV changed to pheytoin PO via PEG per epilepsy recommendations  1/4: GEORGE overnight, neuro stable. FW incr. to 100q4  1/5: GEORGE overnight, neuro stable.   1/6: GEORGE overnight, neuro stable   1/7: GEORGE overnight, neuro stable. BUN/Cr increased, increased FW to 200q6. Given 1L bolus of LR over 2 hours. Gabriel d/c'd, voiding, continue bladder scan q6.   1/8: GEORGE overnight, neuro stable. BUN/Cr improving.  1/9: GEORGE overnight, neuro stable.   1/10: GEORGE overnight, neuro stable.   1/11: GEORGE overnight, neuro stable, vent settings stable.   1/12: GEORGE overnight, neuro stable. Febrile to 102F, f/u pan cx, chest xray, given tylenol. Zosyn started.   1/13: GEORGE overnight, neuro stable, cont Zosyn for presumed PNA, prelim sputum cx growing; few GS neg diplococci, mod GS neg rods, rare GS + rods, fever trend improved. Free water increased to 136pxq6.   1/14: GEORGE overnight. pending renal US for elevated Cr  1/15: GEORGE ovn. renal US complete.   1/16: GEORGE overnight, neuro stable   1/17: GEORGE overnight, neuro stable   1/18: GEORGE overnight, neuro stable.   1/19: GEORGE overnight, neuro stable. Propranolol dec to 5q8.   1/20: GEORGE overnight, neuro stable. Weaned propranolol to 5mg BID.   1/21: GEORGE ovn, in AM having rapid vertical eye movements with facial twitching, 2g total keppra given for AM dose and phenytoin level ordered, vital signs stable. CTH stable. Phenytoin increased to 100/100/150mg per epilepsy  1/22: GEORGE ovn. IV hydral x 1 for SBP 170s.   1/23: Cont to have focal motor seizures. Per epilepsy, changed phenytoin dose to 100/100/200.   1/24: Phenytoin lvl tmrw AM. Chest Xray completed due to temp 100.2F  1/25: GEORGE overnight. Incr dilantin morning and afternoon per epilepsy.   1/26: GEORGE overnight    Vital Signs Last 24 Hrs  T(C): 37.3 (25 Jan 2025 22:28), Max: 37.3 (25 Jan 2025 22:28)  T(F): 99.1 (25 Jan 2025 22:28), Max: 99.1 (25 Jan 2025 22:28)  HR: 86 (26 Jan 2025 00:07) (70 - 88)  BP: 123/89 (26 Jan 2025 00:07) (109/78 - 137/95)  BP(mean): 103 (26 Jan 2025 00:07) (90 - 111)  RR: 14 (26 Jan 2025 00:07) (14 - 16)  SpO2: 96% (26 Jan 2025 00:07) (96% - 100%)    Parameters below as of 26 Jan 2025 00:07  Patient On (Oxygen Delivery Method): ventilator    O2 Concentration (%): 40    I&O's Summary    24 Jan 2025 07:01  -  25 Jan 2025 07:00  --------------------------------------------------------  IN: 1820 mL / OUT: 750 mL / NET: 1070 mL    25 Jan 2025 07:01  -  26 Jan 2025 01:36  --------------------------------------------------------  IN: 2120 mL / OUT: 725 mL / NET: 1395 mL        PHYSICAL EXAM:    General: patient seen laying supine in bed in NAD  Neuro: OEs spontaneously, A&Ox3 with choices, squeezes hand to answer questinos, R hand squeezes hand to command,  RLE moves foot to command,  LLE  withdrawal to noxious, LUE 0/5  HEENT: PERRL, +trach to vent  Neck: supple  Cardiac: RRR, S1S2  Pulmonary: chest rise symmetric  Abdomen: soft, nontender, nondistended, +PEG  Ext: perfusing well  Skin: warm, dry      LABS:                        9.5    8.20  )-----------( 177      ( 25 Jan 2025 05:30 )             30.3     01-25    139  |  102  |  39[H]  ----------------------------<  105[H]  3.6   |  26  |  1.07    Ca    9.3      25 Jan 2025 05:30  Phos  4.4     01-25  Mg     2.1     01-25    TPro  x   /  Alb  3.4  /  TBili  x   /  DBili  x   /  AST  x   /  ALT  x   /  AlkPhos  x   01-25      Urinalysis Basic - ( 25 Jan 2025 05:30 )    Color: x / Appearance: x / SG: x / pH: x  Gluc: 105 mg/dL / Ketone: x  / Bili: x / Urobili: x   Blood: x / Protein: x / Nitrite: x   Leuk Esterase: x / RBC: x / WBC x   Sq Epi: x / Non Sq Epi: x / Bacteria: x          CAPILLARY BLOOD GLUCOSE          Drug Levels: [] N/A  Phenytoin Level, Serum: 4.6 ug/mL (01-25 @ 05:30)  Phenytoin Level, Serum: 2.9 ug/mL (01-23 @ 05:30)  Phenytoin Level, Serum: 3.3 ug/mL (01-21 @ 07:59)    CSF Analysis: [] N/A      Allergies    Allergy Status Unknown    Intolerances      MEDICATIONS:  Antibiotics:    Neuro:  acetaminophen   Oral Liquid .. 650 milliGRAM(s) Oral every 6 hours PRN  baclofen 5 milliGRAM(s) Oral every 12 hours  levETIRAcetam 1500 milliGRAM(s) Oral two times a day  phenytoin   Suspension 200 milliGRAM(s) Oral <User Schedule>  phenytoin   Suspension 125 milliGRAM(s) Oral <User Schedule>    Anticoagulation:  heparin   Injectable 5000 Unit(s) SubCutaneous every 8 hours    OTHER:  acetylcysteine 10%  Inhalation 4 milliLiter(s) Inhalation every 6 hours  albuterol/ipratropium for Nebulization 3 milliLiter(s) Nebulizer every 6 hours  amLODIPine   Tablet 5 milliGRAM(s) Oral daily  artificial tears (preservative free) Ophthalmic Solution 1 Drop(s) Right EYE every 4 hours  chlorhexidine 0.12% Liquid 15 milliLiter(s) Oral Mucosa every 12 hours  chlorhexidine 2% Cloths 1 Application(s) Topical <User Schedule>  doxazosin 8 milliGRAM(s) Oral at bedtime  erythromycin   Ointment 1 Application(s) Right EYE at bedtime  fluticasone propionate 50 MICROgram(s)/spray Nasal Spray 1 Spray(s) Both Nostrils two times a day  influenza   Vaccine 0.5 milliLiter(s) IntraMuscular once  ofloxacin 0.3% Solution 1 Drop(s) Right EYE four times a day  pantoprazole  Injectable 40 milliGRAM(s) IV Push daily  petrolatum Ophthalmic Ointment 1 Application(s) Both EYES two times a day  propranolol 5 milliGRAM(s) Oral every 12 hours  sodium chloride 0.65% Nasal 1 Spray(s) Both Nostrils two times a day    IVF:    CULTURES:  Culture Results:   >=3 organisms. Probable collection contamination. (01-12 @ 15:25)  Culture Results:   No growth at 5 days (01-12 @ 12:50)    RADIOLOGY & ADDITIONAL TESTS:    AMS    Intubate    Handoff    MEWS Score    Acute myocardial infarction    CVA (cerebral vascular accident)    Intracerebral hemorrhage of brain stem    Brainstem stroke    Brain stem stroke syndrome    Brain stem hemorrhage    Brain stem stroke syndrome    Hemorrhagic stroke    Brainstem stroke    Encephalopathy acute    Functional quadriplegia    Advanced care planning/counseling discussion    Encounter for palliative care    Pontine hemorrhage    Neurogenic dysphagia    Chronic respiratory failure    Acute kidney injury superimposed on CKD    Acute urinary retention    Hypertensive emergency    Sepsis, unspecified organism    Sepsis    Gram-negative bacteremia    Dyspnea    Percutaneous tracheal puncture    Altered mental status examination    EGD, with PEG    AMS    Room Service Assist    [email or web address]        ASSESSMENT:  44M PMH ?stroke/MI present to [facility] after collapsing at work. Decorticate posturing, vomiting, intubated for airway protection. 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 Transferred to [facility] for further management. s/p trach 10/14. s/p peg 10/21. Re-upgrade to ICU 2/2 desaturation event and suctioning requirements 11/15. Re-upgrade to NSICU 12/15 2/2 desaturation and tachycardia.    Neuro:  - neuro/vitals q4h  - pain control: tylenol prn  - seizure tx: keppra 1500mg BID, phenytoin 125/125/200  - vEEG (10/3-4) negative, (10/17-10/19) negative, (12/17-12/18) negative, (12/22-12/25) +focal motor seizures not correlating w/ EEG  - CTH 9/30: enlarged pontine hemorrhage, CTH 10/3: stable, CTH 10/25: mostly resolved pontine hemorrhage, CTH 12/15: R mastoid air cell opacification; acute otitis media vs sterile effusion, CTH 12/18: stable. CTH 1/21 stable.  - MRI brain 10/12: parenchymal hemorrhage, acute/subacute R cerebellar stroke      CV:  - -160  - tachycardia: propranolol 5mg BID   - HTN: amlodipine 5mg  - echo (9/30) EF 75%, repeat 12/16: 57%    Resp:  - trach to vent, AC/VC 40/400/14/6  - Secretions: duonebs/mucomyst/chest PT q6h     GI:  - TF via PEG (placed 10/21 by gen surg)  - bowel regimen held for loose stool, last BM 1/23  - baclofen 5q12 for hiccups    Renal:  - IVL, FW 200q4 for hydration   - Urinary retenion: Gabriel removed 1/7, Voiding  - CKD: trend BUN/Cr  - renal US 10/1, 10/8, 1/15: Increased bilateral renal echogenicity consistent with medical renal disease    Endo:  - A1c 5.4    Heme:  - DVT ppx: SCDs, SQH 5000u q8h   - LE dopplers negative 12/16    ID:  - last pan cx 1/12   - empiric PNA: cefepime (12/22-12/30), s/p vanc (12/22-12/23), s/p zosyn (12/15-12/20), s/p vanc (12/15-12/16), s/p zosyn (1/12 -1/18)  - s/p Stenotrophomonas maltophilia PNA: s/p Bactrim (11/18-11/19) s/p Cefepime (11/16-11/18)  - 11/3, (+) sputum for stenotrophomas maltophlia, blood cx (+) klebsiella, cefepime 2gq12 (11/6 - 11/12)   - empiric tx: s/p zosyn (11/3-11/6) , s/p vanc  (11/3-11/5)  - S/p Ancef (10/4-10/14) for PNA, and s/p Unasyn (10/18-10/23) +actinobacter baumanii     MISC:  - ophtho consult for keratitis  - erythromycin ointment R eye q4hrs, ofloxacin ointment R eye QID, artificial tears R eye q2hrs, moisture chamber at bedtime    Dispo: SDU status, DNR, pending PRUCOL for benefits     D/w Dr. D'Amico     Assessment:  Present when checked

## 2025-01-27 LAB
ALBUMIN SERPL ELPH-MCNC: 3.1 G/DL — LOW (ref 3.3–5)
ANION GAP SERPL CALC-SCNC: 11 MMOL/L — SIGNIFICANT CHANGE UP (ref 5–17)
APPEARANCE UR: ABNORMAL
BILIRUB UR-MCNC: NEGATIVE — SIGNIFICANT CHANGE UP
BUN SERPL-MCNC: 33 MG/DL — HIGH (ref 7–23)
CALCIUM SERPL-MCNC: 8.8 MG/DL — SIGNIFICANT CHANGE UP (ref 8.4–10.5)
CHLORIDE SERPL-SCNC: 105 MMOL/L — SIGNIFICANT CHANGE UP (ref 96–108)
CK SERPL-CCNC: 24 U/L — LOW (ref 30–200)
CO2 SERPL-SCNC: 23 MMOL/L — SIGNIFICANT CHANGE UP (ref 22–31)
COLOR SPEC: YELLOW — SIGNIFICANT CHANGE UP
CREAT SERPL-MCNC: 1 MG/DL — SIGNIFICANT CHANGE UP (ref 0.5–1.3)
DIFF PNL FLD: ABNORMAL
EGFR: 94 ML/MIN/1.73M2 — SIGNIFICANT CHANGE UP
EGFR: 94 ML/MIN/1.73M2 — SIGNIFICANT CHANGE UP
GLUCOSE SERPL-MCNC: 122 MG/DL — HIGH (ref 70–99)
GLUCOSE UR QL: NEGATIVE MG/DL — SIGNIFICANT CHANGE UP
HCT VFR BLD CALC: 28.8 % — LOW (ref 39–50)
HGB BLD-MCNC: 9.2 G/DL — LOW (ref 13–17)
KETONES UR-MCNC: NEGATIVE MG/DL — SIGNIFICANT CHANGE UP
LEUKOCYTE ESTERASE UR-ACNC: ABNORMAL
MAGNESIUM SERPL-MCNC: 2 MG/DL — SIGNIFICANT CHANGE UP (ref 1.6–2.6)
MCHC RBC-ENTMCNC: 31.3 PG — SIGNIFICANT CHANGE UP (ref 27–34)
MCHC RBC-ENTMCNC: 31.9 G/DL — LOW (ref 32–36)
MCV RBC AUTO: 98 FL — SIGNIFICANT CHANGE UP (ref 80–100)
NITRITE UR-MCNC: NEGATIVE — SIGNIFICANT CHANGE UP
NRBC # BLD: 0 /100 WBCS — SIGNIFICANT CHANGE UP (ref 0–0)
NRBC BLD-RTO: 0 /100 WBCS — SIGNIFICANT CHANGE UP (ref 0–0)
PH UR: 6 — SIGNIFICANT CHANGE UP (ref 5–8)
PHENYTOIN FREE SERPL-MCNC: 18.2 UG/ML — SIGNIFICANT CHANGE UP (ref 10–20)
PHOSPHATE SERPL-MCNC: 4.4 MG/DL — SIGNIFICANT CHANGE UP (ref 2.5–4.5)
PLATELET # BLD AUTO: 195 K/UL — SIGNIFICANT CHANGE UP (ref 150–400)
POTASSIUM SERPL-MCNC: 3.7 MMOL/L — SIGNIFICANT CHANGE UP (ref 3.5–5.3)
POTASSIUM SERPL-SCNC: 3.7 MMOL/L — SIGNIFICANT CHANGE UP (ref 3.5–5.3)
PROT UR-MCNC: 100 MG/DL
RBC # BLD: 2.94 M/UL — LOW (ref 4.2–5.8)
RBC # FLD: 13.4 % — SIGNIFICANT CHANGE UP (ref 10.3–14.5)
SODIUM SERPL-SCNC: 139 MMOL/L — SIGNIFICANT CHANGE UP (ref 135–145)
SP GR SPEC: 1.01 — SIGNIFICANT CHANGE UP (ref 1–1.03)
UROBILINOGEN FLD QL: 1 MG/DL — SIGNIFICANT CHANGE UP (ref 0.2–1)
WBC # BLD: 4.85 K/UL — SIGNIFICANT CHANGE UP (ref 3.8–10.5)
WBC # FLD AUTO: 4.85 K/UL — SIGNIFICANT CHANGE UP (ref 3.8–10.5)

## 2025-01-27 PROCEDURE — 99233 SBSQ HOSP IP/OBS HIGH 50: CPT

## 2025-01-27 PROCEDURE — 99232 SBSQ HOSP IP/OBS MODERATE 35: CPT

## 2025-01-27 PROCEDURE — 99231 SBSQ HOSP IP/OBS SF/LOW 25: CPT

## 2025-01-27 PROCEDURE — 70450 CT HEAD/BRAIN W/O DYE: CPT | Mod: 26

## 2025-01-27 PROCEDURE — 71045 X-RAY EXAM CHEST 1 VIEW: CPT | Mod: 26

## 2025-01-27 RX ADMIN — Medication 1 APPLICATION(S): at 17:39

## 2025-01-27 RX ADMIN — BACLOFEN 5 MILLIGRAM(S): 10 INJECTION INTRATHECAL at 06:00

## 2025-01-27 RX ADMIN — IPRATROPIUM BROMIDE AND ALBUTEROL SULFATE 3 MILLILITER(S): .5; 2.5 SOLUTION RESPIRATORY (INHALATION) at 11:13

## 2025-01-27 RX ADMIN — OFLOXACIN 1 DROP(S): 3 SOLUTION OPHTHALMIC at 06:00

## 2025-01-27 RX ADMIN — Medication 1 APPLICATION(S): at 06:00

## 2025-01-27 RX ADMIN — IPRATROPIUM BROMIDE AND ALBUTEROL SULFATE 3 MILLILITER(S): .5; 2.5 SOLUTION RESPIRATORY (INHALATION) at 06:19

## 2025-01-27 RX ADMIN — OFLOXACIN 1 DROP(S): 3 SOLUTION OPHTHALMIC at 11:14

## 2025-01-27 RX ADMIN — Medication 650 MILLIGRAM(S): at 23:28

## 2025-01-27 RX ADMIN — Medication 1 DROP(S): at 13:54

## 2025-01-27 RX ADMIN — FLUTICASONE PROPIONATE 1 SPRAY(S): 50 SPRAY, METERED NASAL at 17:40

## 2025-01-27 RX ADMIN — ACETYLCYSTEINE 4 MILLILITER(S): 200 INHALANT RESPIRATORY (INHALATION) at 11:13

## 2025-01-27 RX ADMIN — ACETYLCYSTEINE 4 MILLILITER(S): 200 INHALANT RESPIRATORY (INHALATION) at 06:19

## 2025-01-27 RX ADMIN — Medication 1 DROP(S): at 03:39

## 2025-01-27 RX ADMIN — AMLODIPINE BESYLATE 5 MILLIGRAM(S): 10 TABLET ORAL at 06:00

## 2025-01-27 RX ADMIN — LEVETIRACETAM 1500 MILLIGRAM(S): 10 INJECTION, SOLUTION INTRAVENOUS at 17:38

## 2025-01-27 RX ADMIN — Medication 150 MILLIGRAM(S): at 11:28

## 2025-01-27 RX ADMIN — Medication 150 MILLIGRAM(S): at 04:37

## 2025-01-27 RX ADMIN — OFLOXACIN 1 DROP(S): 3 SOLUTION OPHTHALMIC at 01:00

## 2025-01-27 RX ADMIN — Medication 1 DROP(S): at 06:00

## 2025-01-27 RX ADMIN — Medication 1 SPRAY(S): at 06:00

## 2025-01-27 RX ADMIN — FLUTICASONE PROPIONATE 1 SPRAY(S): 50 SPRAY, METERED NASAL at 06:00

## 2025-01-27 RX ADMIN — IPRATROPIUM BROMIDE AND ALBUTEROL SULFATE 3 MILLILITER(S): .5; 2.5 SOLUTION RESPIRATORY (INHALATION) at 17:38

## 2025-01-27 RX ADMIN — BACLOFEN 5 MILLIGRAM(S): 10 INJECTION INTRATHECAL at 17:38

## 2025-01-27 RX ADMIN — ERYTHROMYCIN 1 APPLICATION(S): 5 OINTMENT OPHTHALMIC at 21:40

## 2025-01-27 RX ADMIN — Medication 15 MILLILITER(S): at 06:00

## 2025-01-27 RX ADMIN — HEPARIN SODIUM 5000 UNIT(S): 1000 INJECTION INTRAVENOUS; SUBCUTANEOUS at 21:40

## 2025-01-27 RX ADMIN — Medication 40 MILLIGRAM(S): at 11:13

## 2025-01-27 RX ADMIN — LEVETIRACETAM 1500 MILLIGRAM(S): 10 INJECTION, SOLUTION INTRAVENOUS at 06:00

## 2025-01-27 RX ADMIN — OFLOXACIN 1 DROP(S): 3 SOLUTION OPHTHALMIC at 17:41

## 2025-01-27 RX ADMIN — DOXAZOSIN MESYLATE 8 MILLIGRAM(S): 8 TABLET ORAL at 21:40

## 2025-01-27 RX ADMIN — Medication 1 SPRAY(S): at 17:38

## 2025-01-27 RX ADMIN — Medication 200 MILLIGRAM(S): at 19:23

## 2025-01-27 RX ADMIN — Medication 40 MILLIEQUIVALENT(S): at 07:10

## 2025-01-27 RX ADMIN — Medication 500 MILLILITER(S): at 05:00

## 2025-01-27 RX ADMIN — Medication 1 DROP(S): at 21:40

## 2025-01-27 RX ADMIN — Medication 1 DROP(S): at 10:33

## 2025-01-27 RX ADMIN — Medication 15 MILLILITER(S): at 17:38

## 2025-01-27 RX ADMIN — Medication 1 DROP(S): at 17:41

## 2025-01-27 RX ADMIN — Medication 650 MILLIGRAM(S): at 22:26

## 2025-01-27 RX ADMIN — HEPARIN SODIUM 5000 UNIT(S): 1000 INJECTION INTRAVENOUS; SUBCUTANEOUS at 06:00

## 2025-01-27 RX ADMIN — ACETYLCYSTEINE 4 MILLILITER(S): 200 INHALANT RESPIRATORY (INHALATION) at 17:37

## 2025-01-27 RX ADMIN — HEPARIN SODIUM 5000 UNIT(S): 1000 INJECTION INTRAVENOUS; SUBCUTANEOUS at 13:54

## 2025-01-27 NOTE — PROGRESS NOTE ADULT - SUBJECTIVE AND OBJECTIVE BOX
O/N Events: GEORGE  Subjective/ROS: Denies HA, CP, SOB, n/v, changes in bowel/urinary habits.  12pt ROS otherwise negative.    VITALS  Vital Signs Last 24 Hrs  T(C): 37.3 (27 Jan 2025 05:13), Max: 37.3 (27 Jan 2025 05:13)  T(F): 99.1 (27 Jan 2025 05:13), Max: 99.1 (27 Jan 2025 05:13)  HR: 88 (27 Jan 2025 09:18) (68 - 88)  BP: 106/73 (27 Jan 2025 08:30) (93/59 - 154/103)  BP(mean): 85 (27 Jan 2025 08:30) (72 - 123)  RR: 15 (27 Jan 2025 09:18) (14 - 18)  SpO2: 98% (27 Jan 2025 09:18) (96% - 100%)    Parameters below as of 27 Jan 2025 09:18  Patient On (Oxygen Delivery Method): ventilator    O2 Concentration (%): 40    I&O's Summary    26 Jan 2025 07:01  -  27 Jan 2025 07:00  --------------------------------------------------------  IN: 2500 mL / OUT: 1110 mL / NET: 1390 mL    27 Jan 2025 07:01  -  27 Jan 2025 11:28  --------------------------------------------------------  IN: 180 mL / OUT: 245 mL / NET: -65 mL        CAPILLARY BLOOD GLUCOSE      POCT Blood Glucose.: 111 mg/dL (26 Jan 2025 17:17)      PHYSICAL EXAM  General: minimally responsive on ventilator, NAD, no labored breathing, vEEG on   HEENT: trach collar in place, clean  Lungs: anterior exam, limited, no crackles, no wheezes  Heart: regular  Abdomen: soft, no distension, + BS  Extremities:  warm, trace edema, not following commands     MEDICATIONS  (STANDING):  acetylcysteine 10%  Inhalation 4 milliLiter(s) Inhalation every 6 hours  albuterol/ipratropium for Nebulization 3 milliLiter(s) Nebulizer every 6 hours  amLODIPine   Tablet 5 milliGRAM(s) Oral daily  artificial tears (preservative free) Ophthalmic Solution 1 Drop(s) Right EYE every 4 hours  baclofen 5 milliGRAM(s) Oral every 12 hours  chlorhexidine 0.12% Liquid 15 milliLiter(s) Oral Mucosa every 12 hours  chlorhexidine 2% Cloths 1 Application(s) Topical <User Schedule>  doxazosin 8 milliGRAM(s) Oral at bedtime  erythromycin   Ointment 1 Application(s) Right EYE at bedtime  fluticasone propionate 50 MICROgram(s)/spray Nasal Spray 1 Spray(s) Both Nostrils two times a day  heparin   Injectable 5000 Unit(s) SubCutaneous every 8 hours  influenza   Vaccine 0.5 milliLiter(s) IntraMuscular once  levETIRAcetam 1500 milliGRAM(s) Oral two times a day  ofloxacin 0.3% Solution 1 Drop(s) Right EYE four times a day  pantoprazole   Suspension 40 milliGRAM(s) Oral daily  petrolatum Ophthalmic Ointment 1 Application(s) Both EYES two times a day  phenytoin   Suspension 150 milliGRAM(s) Oral <User Schedule>  phenytoin   Suspension 200 milliGRAM(s) Oral <User Schedule>  propranolol 5 milliGRAM(s) Oral every 12 hours  sodium chloride 0.65% Nasal 1 Spray(s) Both Nostrils two times a day    MEDICATIONS  (PRN):  acetaminophen   Oral Liquid .. 650 milliGRAM(s) Oral every 6 hours PRN Temp greater or equal to 38C (100.4F), Mild Pain (1 - 3)      Allergy Status Unknown      LABS                        9.2    4.85  )-----------( 195      ( 27 Jan 2025 05:30 )             28.8     01-27    139  |  105  |  33[H]  ----------------------------<  122[H]  3.7   |  23  |  1.00    Ca    8.8      27 Jan 2025 05:30  Phos  4.4     01-27  Mg     2.0     01-27    TPro  x   /  Alb  3.1[L]  /  TBili  x   /  DBili  x   /  AST  x   /  ALT  x   /  AlkPhos  x   01-27      Urinalysis Basic - ( 27 Jan 2025 05:30 )    Color: x / Appearance: x / SG: x / pH: x  Gluc: 122 mg/dL / Ketone: x  / Bili: x / Urobili: x   Blood: x / Protein: x / Nitrite: x   Leuk Esterase: x / RBC: x / WBC x   Sq Epi: x / Non Sq Epi: x / Bacteria: x            IMAGING/EKG/ETC: reviewed

## 2025-01-27 NOTE — PROGRESS NOTE ADULT - SUBJECTIVE AND OBJECTIVE BOX
Neurology Progress Note    Interval History:  The patient was seen and examined at the bedside. Today appears more lethargic, paticipating less in interview.       PAST MEDICAL & SURGICAL HISTORY:  Acute myocardial infarction    CVA (cerebral vascular accident)            Medications:  acetaminophen   Oral Liquid .. 650 milliGRAM(s) Oral every 6 hours PRN  acetylcysteine 10%  Inhalation 4 milliLiter(s) Inhalation every 6 hours  albuterol/ipratropium for Nebulization 3 milliLiter(s) Nebulizer every 6 hours  amLODIPine   Tablet 5 milliGRAM(s) Oral daily  artificial tears (preservative free) Ophthalmic Solution 1 Drop(s) Right EYE every 4 hours  baclofen 5 milliGRAM(s) Oral every 12 hours  chlorhexidine 0.12% Liquid 15 milliLiter(s) Oral Mucosa every 12 hours  chlorhexidine 2% Cloths 1 Application(s) Topical <User Schedule>  doxazosin 8 milliGRAM(s) Oral at bedtime  erythromycin   Ointment 1 Application(s) Right EYE at bedtime  fluticasone propionate 50 MICROgram(s)/spray Nasal Spray 1 Spray(s) Both Nostrils two times a day  heparin   Injectable 5000 Unit(s) SubCutaneous every 8 hours  influenza   Vaccine 0.5 milliLiter(s) IntraMuscular once  levETIRAcetam 1500 milliGRAM(s) Oral two times a day  ofloxacin 0.3% Solution 1 Drop(s) Right EYE four times a day  pantoprazole   Suspension 40 milliGRAM(s) Oral daily  petrolatum Ophthalmic Ointment 1 Application(s) Both EYES two times a day  phenytoin   Suspension 150 milliGRAM(s) Oral <User Schedule>  phenytoin   Suspension 200 milliGRAM(s) Oral <User Schedule>  propranolol 5 milliGRAM(s) Oral every 12 hours  sodium chloride 0.65% Nasal 1 Spray(s) Both Nostrils two times a day      Vital Signs Last 24 Hrs  T(C): 37.3 (27 Jan 2025 05:13), Max: 37.3 (27 Jan 2025 05:13)  T(F): 99.1 (27 Jan 2025 05:13), Max: 99.1 (27 Jan 2025 05:13)  HR: 82 (27 Jan 2025 08:30) (68 - 88)  BP: 106/73 (27 Jan 2025 08:30) (93/59 - 154/103)  BP(mean): 85 (27 Jan 2025 08:30) (72 - 123)  RR: 14 (27 Jan 2025 08:30) (14 - 18)  SpO2: 100% (27 Jan 2025 08:30) (96% - 100%)    Parameters below as of 27 Jan 2025 08:30  Patient On (Oxygen Delivery Method): ventilator    O2 Concentration (%): 40    Neurological Exam:   Mental status: Awake, more lethargic today. Occasionally briefly tracking interviewer. On asking his name with choices appeared to look up, but difficult to ascertain given his facial twitching. Intermittently follows commands to close and open eyes.  +Blink to threat on L eye only. left eyelid and lip be twitching. Intermittent rapid vertical eye movements. DTRs 3+ throughout. ?Wiggles toes on the R foot to command, not clearly withdrawing to noxious or moving UE to command.      Labs:  CBC Full  -  ( 27 Jan 2025 05:30 )  WBC Count : 4.85 K/uL  RBC Count : 2.94 M/uL  Hemoglobin : 9.2 g/dL  Hematocrit : 28.8 %  Platelet Count - Automated : 195 K/uL  Mean Cell Volume : 98.0 fl  Mean Cell Hemoglobin : 31.3 pg  Mean Cell Hemoglobin Concentration : 31.9 g/dL  Auto Neutrophil # : x  Auto Lymphocyte # : x  Auto Monocyte # : x  Auto Eosinophil # : x  Auto Basophil # : x  Auto Neutrophil % : x  Auto Lymphocyte % : x  Auto Monocyte % : x  Auto Eosinophil % : x  Auto Basophil % : x    01-27    139  |  105  |  33[H]  ----------------------------<  122[H]  3.7   |  23  |  1.00    Ca    8.8      27 Jan 2025 05:30  Phos  4.4     01-27  Mg     2.0     01-27    TPro  x   /  Alb  3.1[L]  /  TBili  x   /  DBili  x   /  AST  x   /  ALT  x   /  AlkPhos  x   01-27    LIVER FUNCTIONS - ( 27 Jan 2025 05:30 )  Alb: 3.1 g/dL / Pro: x     / ALK PHOS: x     / ALT: x     / AST: x     / GGT: x             Urinalysis Basic - ( 27 Jan 2025 05:30 )    Color: x / Appearance: x / SG: x / pH: x  Gluc: 122 mg/dL / Ketone: x  / Bili: x / Urobili: x   Blood: x / Protein: x / Nitrite: x   Leuk Esterase: x / RBC: x / WBC x   Sq Epi: x / Non Sq Epi: x / Bacteria: x        Assessment:  This is a 44y Male w/ h/o     Plan:

## 2025-01-27 NOTE — PROGRESS NOTE ADULT - SUBJECTIVE AND OBJECTIVE BOX
HPI:  43 yo M, unknown past medical history (reported stroke and MI by coworkers) presented to [facility] with AMS, Pt was working at [employer] when he was found down by coworkers. EMS called and pt brought to [facility] ED. Intubated, sedated, started on cardene for SBPs in 200s. CT head showed brain stem bleed. Transferred to NSICU for further management.  (30 Sep 2024 12:55)      Subjective:  o/n CTH odered due to continued lethargy after ativan given yesterday afternoon. Phenytoin increased to 150/150/200. Per epilepsy EEG negative so far for seizure activity.     Hospital course:  9/30: transferred from [facility]. A line placed. Versed dc'd. Cy Rader at bedside, states pt has family in [place], cannot confirm medications or PMH other than stroke and MI. 250cc bolus 3% given. LR switched to NS. hydralazine 25q8 started, 3% started, switched propofol to precedex   10/1: stability CTH done. Added labetalol, started TF. Palliative consulted. ethics consulted to determine surrogate. febrile 103, pan cx sent  10/2: BD 2, GEORGE overnight. TF resumed. Desatt'd to 80s, FiO2 inc. to 50. Fentanyl given, ABG, CXR ordered. Maxxed on precedex, started on propofol for DARIEN -4 - -5. Precedex dc'd. Duonebs, mucomyst, hypertonic added. 3% dc'd. Cardene dc'd. Start vanc/CTX. Increased labetalol 200q8. MRSA negative, dc'd vanc. ETT pulled back 2cm x 2, good positioning after confirmatory chest xray. Ethics attempting to establish HCP with family. Na 159, starting FW 250q6 for range 150-155.   10/3: BD3, GEORGE o/n, neuro stable. Na elevating, FW increased to 300q6. Dc'd bowel reg for diarrhea. vEEG started. SQH 5000q8 tonight.   10/4: BD 4, albumn bolus, incr. LR to 80 2/2 incr. in Cr, LR to 10 0cc/hr for uptrending Cr. Started 7% hypersal for 48hrs and SL atropine for inline/oral thick secretions. Dc'd CTX and started ancef for MSSA in the sputum. Nephrology consulted for CKD, f/u recs. SBP 170s, given hydralazine 10mg IVP.   10/5: BD5, o/n 10mg IVP hydralazine given for SBP 170s and started on hydralazine 25q8 via OGT. 10mg IV push labetalol for SBP > 160s. RT placed for diarrhea.   10/6: BD6, o/n FW increased to 350q4 per nephrology recs. IV tylenol for temp 100.6, SBp 160s presumed uncomfortable.   10/7: BD7, overnight pancultured for temp 101.8F.   10/8: BD8. GEORGE. Cr bumped. decreased LR to 75cc/hr. Adding simethicone ATC. incr hydralazine 50mgTID. Incr labetalol 300mgTID. Na 145, decreased FWF to 250q6. Start precedex. FENa consistent with intrinsic kidney injury. Pend repeat renal US. Retaining up to 1.3L, bladder scans q6, straight cath PRN  10/9: BD 9. GEORGE overnight. Neuro stable. abd xray for distention w non-specific gas pattern, OGT to LIWS for morning. duonebs/mucomyst to q8 for improving secretions. Changed tube feeds to Jevity 1.5 20cc/hr, low rate due to abdominal distention, nepro dense and more difficult to digest. Tolerating CPAP, confirmed by ABG.   10/10: BD 10. GEORGE overnight. Neuro stable. (+) gabriel for urinary retention on bladder scan. inc TF to goal rate of 40cc/hr. family leaning toward pursuing trach/PEG. 1/2 amp for FS 81.   10/11: BD 11. GEORGE overnight. Neuro stable. Trach/PEG consults placed.   10/12: BD 12. GEORGE overnight. Neuro stable. MRI brain complete.   10/13: BD 13. Increase flomax. Hold SQH after PM dose for trach tm. IVL.   10/14: BD 14. GEORGE overnight, remains on AC/VC. Gabriel placed for urinary retention. Dc'd free water.  S/p trach with pulm. NGT placed and CXR confirmed in good position.   10/15: BD 15, GEORGE ovn. resumed feeds. spiked 101, pan cx sent.   10/16: BD 16. GEORGE ovn. Lokelma 5mg for K+ 5.4. Started vanc q 24/zosyn for empiric PNA coverage, IVF to 100/hr. PEG held for fever.   10/17: BD 17,  ordered serum osm and urine osm for am. Started sinemet for neurostimulation. Increased cardura to 0.8. Started FW 100q4, dc'd IVF. MRSA negative, dc'd vanc. NGT replaced d/t coiling.   10/18: BD 18, GEORGE overnight, neuro stable. Amantadine added for neurostim. zosyn changed to unasyn for acinetobacter baumannii, failed TOV and required SC  10/19: BD 19, GEORGE ovn. cardura 2mg added for retention. labetalol decreased 200q8, hydralazine decreased 25q8. Gabriel replaced.   10/20: BD20, GEORGE overnight. NGT dislodged, replaced. PEG tomorrow w/ gen surg, FW increased to 150q4 and labetalol decreased to 100q8, lokelma given for hyperkalemia.   10/21: BD 21. POD0 PEG placement with Gen surg. decr labetolol to 50q8, incr. cardura to 0.4, started lokelma and phoslo, dc gabriel POD0 PEG placement with Gen surg.  10/22: BD 22. Plan to start TF today via PEG. dc labetalol, Following ophtho recs. Increased apnea settings - found to be in cheyne-moe respiration. CPAP 5/5.  10/23: BD 23. hydralazine d/c'd, trach collar trial today. Rectal tube placed at 6am.  10/24: BD24, o/n lokelma held due to diarrhea. Free water 100q6 resumed. dc'd tamsulosin, amantadine. Incr'd cardura to 8mg qhs. Dc'd FW. Switched jevity to nepro. gabriel placed for high urine output. Started SL atropine for oral secretions. Dc'd free water.  10/25: BD25, o/n decreased suctioning requirements to > q4hrs, GEORGE. Cr improving, cont phoslo, lokelma held at this time. Gabriel placed yest, cont. Tolerating trach collar. Given 500cc plasmalyte bolus for ANIKA. Dc'd sinemet.   10/26: BD26, o/n resumed lokelma 5mg daily and resumed 100cc free water q6hrs. Change in neuro status with new right pupillary dilation with anisocoria (right pupil 6mm fixed and left pupil 3mm briskly reactive). Given 23.4% NaCl bullet, taken for emergent CTH showing mostly resolved pontine hemorrhage, continued brainstem hypodensity likely edema d/t hemorrhage, no new hemorrhage or infarct, no herniation, mild increase in size of left lateral ventricle. Vitals remaine stable. Na goal > 140.   10/27: BD27, o/n GEORGE.Neuro stable. Pend stepdown with airway bed.   10/28: BD 28. GEORGE overnight. Neuro stable. Miralax ordered. Gabriel removed, pending TOV.  10/29: BD 29. GEORGE o/n. Given 2L NS over 8 hrs for increased BUN/Cr ratio. Gabriel placed for frequent straight cath.   10/30: BD 30.   10/31: BD 31. GEORGE overnight. Na 149, increased free water to 200q6. 1L NS for uptrending BUN.   11/1: BD 32. GEORGE overnight. Given 1L NS for dehydration. Na 146, increased FW to 250q6.   11/2: BD 33 GEORGE overnight, neuro stable, given 500cc bolus for net negative status and tachycardia   11/3: BD 34, GEORGE overnight, neuro stable. Patient remains tachycardic, EKG showing sinus tachycardia, given additional 500cc NS bolus. Febrile to 101.9F, pan cultured (without UA), CXR WNL, given tylenol.   11/4: BD 35, GEORGE overnight, neuro stable. Given 1L NS for tachycardia. sputum (+) for stenotropohomas maltophilia.   11/5: BD 36 GEORGE overnight, neuro stable. Vancomycin dc'd. Chest PT BID. ID consulted, cont zosyn.  11/6: BD 37. blood cx + klebsiella dc zosyn changed to cefepime, CTAP ordered, rpt blood cx sent.    11/7: BD 38. Pending CT A/P, given 250cc bolus and starting maintenance fluids overnight. Pending CT A/P after bolus   11/8: BD 39. CT CAP negative for infection.   11/9: BD 40. GEORGE overnight.  11/10: BD 41. GEORGE overnight. desat to 85 on trach collar, O2 inc to 10L and 100%, O2 sat inc to 95. pt tachy to 110s, euvolemic. given tylenol. ABG and CXR ordered. spiked fever, pancultured, RVP negative. AM ABG w pO2 79, rpt w pO2 79. pt appears comfortable, satting 94%.   11/11: BD 42. GEORGE overnight. pt became tachy to 130s, desat to 90 on 100% FiO2 and 10L. suctioned, (+) productive cough. temp 101.4, given 1g IV tylenol and 500cc NS bolus for euvolemia. fever and HR downtrending. LE dopplers negative for dvt  11/12: BD 43, GEORGE ovn, fever and HR downtrending, satting 97% 70% FIO2  11/13: BD 44, GEORGE ovn. started standing tylenol x24 hours for tachycardia. desat to 80s, o2 increased. CXR stable, pending CTA PE protocol.   11/14: BD 45, GEORGE overnight, neuro stable. resp therapy dec FiO2 to 70%.   11/15: BD 46, GEORGE overnight, neuro stable.  Rapid called for desaturation 30s, tachycardic 140s. Patient bagged, 100% fio2, heavily suctioned. CXR/POCUS unremarkable. ABG c/w desaturation. WBC 14.71. Afebrile. O2 improved to 90s and patient upgraded to ICU. ABG paO2 30s improved to 89 on vent. IV Tylenol x 1, sputum sent. Start protonix while o-n vent.   11/16: BD 47. POCUS showed collapsable IVCF, given 1L bolus. Vanco/Cefpime added empriic for PNA, NGT feeds restarted. MRSA swab neg, Vanc DC'd.   11/17: BD 48. GEORGE overnight. 1l bolus for tachycardia. Spiked to 101, cultured. 500cc bolus for tachycardia, tachy to 148 given 25mcg fentanyl, 250cc albumin, 1.5L bolus. 5 IV lopressor with response HR to 100s. +Stenotrophomonas on sputum cx.   11/18: BD 49. GEORGE overnight. Consulted ID, cefepime switched to bactrim x7days. Started hydrochlorothiazine 12.5mg daily.  11/19: BD 50. Tachy 120s, given tylenol and 500cc NS. Tolerating 5/5, switched to TCx. Holding phos binder. D/c Bactrim. D/c gabriel, f/u TOV. Dc'd PPI.   11/20: BD 51. GEORGE ovn. 1600 satting low 90s, mildly tachy to 110s, afebrile, RR wnl. O2 improved to mid 90s while inc O2 to 100% on TCx. CPAP 5/5 placed back on.  11/21: BD 52, GEORGE ovn, tolerating CPAP 5/5. Switch to trach collar during the day if tolerating well. HCTZ held for Cr bump, straight cath frequence increased to q4  11/22: BD 53, GEORGE ovn. Resumed phoslo. Gabriel placed. Resumed HCTZ.   11/23: BD 54. Holding tylenol in setting of possible fever, will require pan cx if febrile. Cr improved today. Cont CPAP. Bowel regimen held i/s/o diarrhea. FOBT negative.  11/24: BD 55. GEORGE overnight. Neuro stable. HCTZ dc'ed, started lisinopril 5. Lokelma dc'ed for K 3.7.   11/25: BD 56, GEORGE overnight. Neuro stable. dc'd lisinopril 5mg. Gabriel dc'd. TOV. 1545 noted to be hypotensive, MAP 50, in supine position on chair, HR 60s, afebrile, O2 96%. Given 1L cc NS bolus, placed back on bed in reverse trendelenberg, improved to map of 66. Neostick at bedside. Vitals check q1h. Dc'ed amlodipine. Failed TOV, bladder scan q6, sc prn. Added back Senna.   11/26: BD 57, GEORGE overnight. Neuro stable. Dc'd phoslo.   11/27: BD 58, GEORGE overnight. Neuro stable.    11/28: BD 59. Gabriel replaced.   11/29: GEORGE.  11/30: GEORGE, neuro stable.   12/1: BD 62, GEORGE overnight  12/2: BD 63, GEORGE overnight.; Given 1L bolus of LR for uptrending BUN/Cr.  12/3: BD 64. Reinstated eye gtt/moisture chamber given increased Rt eye injection  12/4: BD 65. GEORGE overnight. Attempted to speak with ophtho regarding eyelid weight/closure but no answer, full mailbox.   12/5: BD 66, GEORGE overnight, bowel regimen increased and had BM.   12/6: BD 67, GEORGE overnight, neuro stable.  12/7: GEORGE overnight, neuro stable. /110s, given x1 hydralazine 10 mg IVP. Restarted home amlodipine 5mg.  12/8: GEORGE. OOB to chair.     12/11: GEORGE, mucomyst added for thick secretions, simethicone for abd distension, abd xray with stool burden, increased bowel regimen.   12/12: GEORGE overnight.   12/13: GEORGE overnight.   12/14: GEORGE overnight  12/15: o/n Patient became tachycardic HR 120s and 10 minutes later O2 sat dropped as low as 89%. Patient suctioned without improvement in O2 sat and tube feeds found in suction catheter. TFs held.  STAT CXR ordered. STAT labs sent. Respiratory therapist called to bedside and patient trach connected to ventilator. After connection to ventilator and further suctioning O2 sat improved to 97% but patient HR remains 120-130s. Upgraded to NSICU for further management. Vancomycin and zosyn started. CTA  chest PE protocol and CTH ordered. Blood cultures sent.given 500cc bolus, rpt ABG sent pO2 243, CTH and CTA chest done. FS while NPO, FiO2 dec 50 pending ABG. sputum cx positive for few GPC and GNR.   12/16: GEORGE overnight, restarted amlodipine, troponin 75   12/17: GEORGE overnight, neuro stable. Attempted CPAP this morning, did not tolerate and back on full vent support. Dc'd Vanc. Tachycardia to 140s, noted extremities to be twitching along with jaw twitching. Given 2g of Keppra, total 4mg ativan and placed on EEG, full set of labs and lactate negative. Resumed trickle feeds at 20cc/hr. Given 250cc albumin for tachycardia.   12/18: GEORGE overnight. Neuro stable. CTH stable. EEG negative and dc'd.   12/19: GEORGE overnight. neuro stable. SIMV most of day, AC/VC at night.   12/20: GEORGE overnight, neuro stable. remains on VC/AC  12/21: GEORGE ovn. 1L bolus for tachy to 120s-130s.   12/22: GEORGE ovn. Tachy to 120s, given tylenol and IVF. 2mg IV ativan given for L jaw twitching. Sx resolved for 3 minutes and started again. Epilepsy contacted. Given 2mg IV ativan. Continued twitching. Increased keppra to 1500mg BID, 2mg ativan given. Propranolol started for refractory tachycardia. vEEG ordered. albumin given. POCUS performed, no b lines. Started on fosphenytoin, loaded w 20mg/kg then 100mg TID. febrile, pancx, started cefepime 2g q8, vancomycin  12/23: GEORGE ovn. +focal motor seizures on eeg. ID consulted. Vancomycin dc'ed as per ID.  12/24: GEORGE ovn, neuro stable. Baclofen 5 mg q12 started for hiccups. Na 129 from 134. Urine lytes c/w SIADH. Given 250cc 3% bolus. CT chest for infection w/u - f/u read. Repeat Na 136. UA negative  12/25: GEORGE ovn.   12/26: GEORGE ovn  12/27: GEORGE overnight. Blood cx neg @ 4 days, DC cefepime per medicine (sputum colonized).   12/28: Desaturation o/n to 80's, CXR obtained, pulse ox changed and sats resolved. Na 133 from 138, 250cc 3% bolus given. Cefepime resumed until 12/30 per ID. Rpt Na 138.  12/29: GEORGE overnight. Na stable.   12/30: GEORGE overnight. Family meeting with son, pt now DNR.   12/31: GEORGE overnight.   1/1: GEORGE overnight, neuro stable.  1/2: GEORGE overnight, neuro stable. FW decreased to 100q6.   1/3: GEORGE overnight, neuro stable. fosphenytoin IV changed to pheytoin PO via PEG per epilepsy recommendations  1/4: GEORGE overnight, neuro stable. FW incr. to 100q4  1/5: GEORGE overnight, neuro stable.   1/6: GEORGE overnight, neuro stable   1/7: GEORGE overnight, neuro stable. BUN/Cr increased, increased FW to 200q6. Given 1L bolus of LR over 2 hours. Gabriel d/c'd, voiding, continue bladder scan q6.   1/8: GEORGE overnight, neuro stable. BUN/Cr improving.  1/9: GEORGE overnight, neuro stable.   1/10: GEORGE overnight, neuro stable.   1/11: GEORGE overnight, neuro stable, vent settings stable.   1/12: GEORGE overnight, neuro stable. Febrile to 102F, f/u pan cx, chest xray, given tylenol. Zosyn started.   1/13: GEORGE overnight, neuro stable, cont Zosyn for presumed PNA, prelim sputum cx growing; few GS neg diplococci, mod GS neg rods, rare GS + rods, fever trend improved. Free water increased to 262sxm6.   1/14: GEORGE overnight. pending renal US for elevated Cr  1/15: GEORGE ovn. renal US complete.   1/16: GEORGE overnight, neuro stable   1/17: GEORGE overnight, neuro stable   1/18: GEORGE overnight, neuro stable.   1/19: GEORGE overnight, neuro stable. Propranolol dec to 5q8.   1/20: GEORGE overnight, neuro stable. Weaned propranolol to 5mg BID.   1/21: GEORGE ovn, in AM having rapid vertical eye movements with facial twitching, 2g total keppra given for AM dose and phenytoin level ordered, vital signs stable. CTH stable. Phenytoin increased to 100/100/150mg per epilepsy  1/22: GEORGE ovn. IV hydral x 1 for SBP 170s.   1/23: Cont to have focal motor seizures. Per epilepsy, changed phenytoin dose to 100/100/200.   1/24: Phenytoin lvl tmrw AM. Chest Xray completed due to temp 100.2F  1/25: GEORGE overnight. Incr dilantin morning and afternoon per epilepsy.   1/26: GEORGE overnight. Sustained focal twitching noticed by nursing. Ativan 8mg in total given. Fosphenytoin 1500mg IV given. Placed on EEG. Epilepsy following. TFs held. Phenytoin increased to 150/150/200.   1/27: o/n CTH odered due to continued lethargy after ativan given yesterday afternoon.     Vital Signs Last 24 Hrs  T(C): 36.3 (26 Jan 2025 21:26), Max: 37.2 (26 Jan 2025 16:20)  T(F): 97.3 (26 Jan 2025 21:26), Max: 99 (26 Jan 2025 16:20)  HR: 80 (26 Jan 2025 23:49) (68 - 86)  BP: 114/82 (26 Jan 2025 20:03) (114/76 - 154/103)  BP(mean): 93 (26 Jan 2025 20:03) (90 - 123)  RR: 14 (26 Jan 2025 23:49) (14 - 18)  SpO2: 99% (26 Jan 2025 23:49) (96% - 100%)    Parameters below as of 26 Jan 2025 23:49  Patient On (Oxygen Delivery Method): ventilator    O2 Concentration (%): 40    I&O's Summary    25 Jan 2025 07:01  -  26 Jan 2025 07:00  --------------------------------------------------------  IN: 2620 mL / OUT: 1425 mL / NET: 1195 mL    26 Jan 2025 07:01  -  27 Jan 2025 00:14  --------------------------------------------------------  IN: 1120 mL / OUT: 160 mL / NET: 960 mL        PHYSICAL EXAM:  General: patient seen laying supine in bed in NAD  Neuro: does not open eyes, does not follow commands, RUE trace withdrawal to noxious, b/l lower extremities withdraw,  LUE 0/5, mild mouth and R eye twitching  HEENT: PERRL, +trach to vent  Neck: supple  Cardiac: RRR, S1S2  Pulmonary: chest rise symmetric  Abdomen: soft, nontender, nondistended, +PEG  Ext: perfusing well  Skin: warm, dry      LABS:                        9.8    5.29  )-----------( 198      ( 26 Jan 2025 20:00 )             31.1     01-26    139  |  102  |  35[H]  ----------------------------<  118[H]  4.6   |  25  |  1.05    Ca    8.9      26 Jan 2025 20:00  Phos  5.1     01-26  Mg     2.0     01-26    TPro  x   /  Alb  3.0[L]  /  TBili  x   /  DBili  x   /  AST  x   /  ALT  x   /  AlkPhos  x   01-26      Urinalysis Basic - ( 26 Jan 2025 20:00 )    Color: x / Appearance: x / SG: x / pH: x  Gluc: 118 mg/dL / Ketone: x  / Bili: x / Urobili: x   Blood: x / Protein: x / Nitrite: x   Leuk Esterase: x / RBC: x / WBC x   Sq Epi: x / Non Sq Epi: x / Bacteria: x          CAPILLARY BLOOD GLUCOSE      POCT Blood Glucose.: 111 mg/dL (26 Jan 2025 17:17)      Drug Levels: [] N/A  Phenytoin Level, Serum: 3.5 ug/mL (01-26 @ 05:30)  Phenytoin Level, Serum: 4.6 ug/mL (01-25 @ 05:30)  Phenytoin Level, Serum: 2.9 ug/mL (01-23 @ 05:30)    CSF Analysis: [] N/A      Allergies    Allergy Status Unknown    Intolerances      MEDICATIONS:  Antibiotics:    Neuro:  acetaminophen   Oral Liquid .. 650 milliGRAM(s) Oral every 6 hours PRN  baclofen 5 milliGRAM(s) Oral every 12 hours  levETIRAcetam 1500 milliGRAM(s) Oral two times a day  phenytoin   Suspension 150 milliGRAM(s) Oral <User Schedule>  phenytoin   Suspension 200 milliGRAM(s) Oral <User Schedule>    Anticoagulation:  heparin   Injectable 5000 Unit(s) SubCutaneous every 8 hours    OTHER:  acetylcysteine 10%  Inhalation 4 milliLiter(s) Inhalation every 6 hours  albuterol/ipratropium for Nebulization 3 milliLiter(s) Nebulizer every 6 hours  amLODIPine   Tablet 5 milliGRAM(s) Oral daily  artificial tears (preservative free) Ophthalmic Solution 1 Drop(s) Right EYE every 4 hours  chlorhexidine 0.12% Liquid 15 milliLiter(s) Oral Mucosa every 12 hours  chlorhexidine 2% Cloths 1 Application(s) Topical <User Schedule>  doxazosin 8 milliGRAM(s) Oral at bedtime  erythromycin   Ointment 1 Application(s) Right EYE at bedtime  fluticasone propionate 50 MICROgram(s)/spray Nasal Spray 1 Spray(s) Both Nostrils two times a day  influenza   Vaccine 0.5 milliLiter(s) IntraMuscular once  ofloxacin 0.3% Solution 1 Drop(s) Right EYE four times a day  pantoprazole  Injectable 40 milliGRAM(s) IV Push daily  petrolatum Ophthalmic Ointment 1 Application(s) Both EYES two times a day  propranolol 5 milliGRAM(s) Oral every 12 hours  sodium chloride 0.65% Nasal 1 Spray(s) Both Nostrils two times a day    IVF:  sodium chloride 0.9%. 1000 milliLiter(s) IV Continuous <Continuous>    CULTURES:    RADIOLOGY & ADDITIONAL TESTS:    AMS    Intubate    Handoff    MEWS Score    Acute myocardial infarction    CVA (cerebral vascular accident)    Intracerebral hemorrhage of brain stem    Brainstem stroke    Brain stem stroke syndrome    Brain stem hemorrhage    Brain stem stroke syndrome    Hemorrhagic stroke    Brainstem stroke    Encephalopathy acute    Functional quadriplegia    Advanced care planning/counseling discussion    Encounter for palliative care    Pontine hemorrhage    Neurogenic dysphagia    Chronic respiratory failure    Acute kidney injury superimposed on CKD    Acute urinary retention    Hypertensive emergency    Sepsis, unspecified organism    Sepsis    Gram-negative bacteremia    Dyspnea    Percutaneous tracheal puncture    Altered mental status examination    EGD, with PEG    AMS    Room Service Assist    [email or web address]        ASSESSMENT:  44M PMH ?stroke/MI present to [facility] after collapsing at work. Decorticate posturing, vomiting, intubated for airway protection. 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 Transferred to [facility] for further management. s/p trach 10/14. s/p peg 10/21. Re-upgrade to ICU 2/2 desaturation event and suctioning requirements 11/15. Re-upgrade to NSICU 12/15 2/2 desaturation and tachycardia.    Neuro:  - neuro/vitals q4h  - pain control: tylenol prn  - seizure tx: keppra 1500mg BID, phenytoin 150/150/200  - vEEG replaced 1/26, s/p vEEG (10/3-4) negative, (10/17-10/19) negative, (12/17-12/18) negative, (12/22-12/25) +focal motor seizures not correlating w/ EEG,  - CTH 9/30: enlarged pontine hemorrhage, CTH 10/3: stable, CTH 10/25: mostly resolved pontine hemorrhage, CTH 12/15: R mastoid air cell opacification; acute otitis media vs sterile effusion, CTH 12/18: stable. CTH 1/21 stable, pending CTH 1/27  - MRI brain 10/12: parenchymal hemorrhage, acute/subacute R cerebellar stroke      CV:  - -160  - tachycardia: propranolol 5mg BID   - HTN: amlodipine 5mg  - echo (9/30) EF 75%, repeat 12/16: 57%    Resp:  - trach to vent, AC/VC 40/400/14/6  - Secretions: duonebs/mucomyst/chest PT q6h     GI:  - +PEG, NPO  - bowel regimen held for loose stool, last BM 1/26  - baclofen 5q12 for hiccups    Renal:  - NS at 60 while NPO (was FW 200q4 for hydration )  - Urinary retenion: Gabriel removed 1/7, Voiding  - CKD: trend BUN/Cr  - renal US 10/1, 10/8, 1/15: Increased bilateral renal echogenicity consistent with medical renal disease    Endo:  - A1c 5.4    Heme:  - DVT ppx: SCDs, SQH 5000u q8h   - LE dopplers negative 12/16    ID:  - last pan cx 1/12   - empiric PNA: cefepime (12/22-12/30), s/p vanc (12/22-12/23), s/p zosyn (12/15-12/20), s/p vanc (12/15-12/16), s/p zosyn (1/12 -1/18)  - s/p Stenotrophomonas maltophilia PNA: s/p Bactrim (11/18-11/19) s/p Cefepime (11/16-11/18)  - 11/3, (+) sputum for stenotrophomas maltophlia, blood cx (+) klebsiella, cefepime 2gq12 (11/6 - 11/12)   - empiric tx: s/p zosyn (11/3-11/6) , s/p vanc  (11/3-11/5)  - S/p Ancef (10/4-10/14) for PNA, and s/p Unasyn (10/18-10/23) +actinobacter baumanii     MISC:  - ophtho consult for keratitis  - erythromycin ointment R eye q4hrs, ofloxacin ointment R eye QID, artificial tears R eye q2hrs, moisture chamber at bedtime    Dispo: SDU status, DNR, pending PRUCOL for benefits     D/w Dr. D'Amico

## 2025-01-27 NOTE — PROGRESS NOTE ADULT - ASSESSMENT
44M with unclear PMH (?stroke, MI) who was found down at work, intubated for airway protection and found to have acute parenchymal hemorrhage within nabil with mass effect (+ acute/subacute right cerebellar infarct) in setting of hypertensive emergency, transferred to [facility] for further neurosurgical care. Hospital course c/b poor neurologic recovery s/p trach-PEG, AUR s/p gabriel, ANIKA on CKD c/b hyperkalemia, HAP s/p amp-sulbactam (EOT 10/23), K aerogenes bacteremia  treated with 2 weeks of Cefepime per ID , On 11/15 he became septic and was transferred to NSICU due to increased o2 requirements and needed Vent support , Trach Cultures + for Stenotrophomonas which was treated with 7 days of Bactrim per ID , has been weaned of Vent since 11/23 and is now on 10lts at 40% o2 through Trach Collar. Stepped up to the NSICU on 12/15 for hypoxia and tachycardia. PE ruled out. On abx for 5 days. Unclear etiology. Now stepped down to 8Lach.    # Pontine hemorrhage  # Encephalopathy due to Intracranial Bleed   - Acute parenchymal hemorrhage within nabil with mass effect (+ acute/subacute right cerebellar infarct) in setting of hypertensive emergency.   - MRI brain also demonstrated acute/subacute R cerebellar stroke.   - No Neurosurgical Interventions were performed   - Secondary to IPH and cerebellar stroke patient has become Trach and PEG Dependent    # Seizures  - Continue Seizure precautions   - Continue Keppra and phenytoin  -Epilepsy following; Will continue to monitor corrected phenytoin level   -vEEG replaced, still with rhythmic face    # Hypertension  - Continue amlodipine 5mg daily with holding parameters  - Will continue to monitor BP and uptitrate antihypertensive as required     # Chronic respiratory failure.   - s/p percutaneous trach by pulm on 10/14/24  - Currently on Vent Support   - Continue Chest PT    # Neurogenic dysphagia.   - Pt s/p PEG with surgery 10/21/24  - TF per nutrition  - aspiration precautions and elevation of HOB.    #Acute urinary retention.   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

## 2025-01-27 NOTE — PROGRESS NOTE ADULT - ASSESSMENT
44 year-old-male with unclear PMH (?stroke, MI) who was found down at work, intubated for airway protection and found to have acute large parenchymal hemorrhage within nabil with mass effect (+ acute/subacute right cerebellar infarct) in setting of hypertensive emergency, and R cerebellar stroke transferred to [facility] for further neurosurgical care. 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 Epilepsy recalled as pt with vertical eye movements that appeared rhythmic and resolved after given AM Keppra +additional 500mg. Dilantin level 3.3. High suspicion for continued focal seizures, however given location of the bleed, initially suspected that repeating EEG will have limited utility. Unclear if EEG negative for focal seizures or hemifacial spasm is from pontine irritation. Given subtherapeutic level, we recommend increasing Dilantin and monitoring clinically for improvement.  On 1/26, pt was repotedly foaming at the mouth, less responsive with increasing in facial twitching (video reviewed by attending, which seemed equivocal to prior). Ativan 8mg and Fosphenytoin 1500mg was given by primary team. Epilepsy recommended increasing phenytoin to 150/150/200mg. Level on 1/27 was 18.2 44 year-old-male with unclear PMH (?stroke, MI) who was found down at work, intubated for airway protection and found to have acute large parenchymal hemorrhage within nabil with mass effect (+ acute/subacute right cerebellar infarct) in setting of hypertensive emergency, and R cerebellar stroke transferred to [facility] for further neurosurgical care. 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 Epilepsy recalled as pt with vertical eye movements that appeared rhythmic and resolved after given AM Keppra +additional 500mg. Dilantin level 3.3. High suspicion for continued focal seizures, however given location of the bleed, initially suspected that repeating EEG will have limited utility. Unclear if EEG negative for focal seizures or hemifacial spasm is from pontine irritation. Given subtherapeutic level, we recommend increasing Dilantin and monitoring clinically for improvement. On 1/26, pt was repotedly foaming at the mouth, less responsive with increasing in facial twitching (video reviewed by attending, which seemed equivocal to prior). vEEG was restarted Ativan 8mg and Fosphenytoin 1500mg was given by primary team. Epilepsy recommended increasing phenytoin to 150/150/200mg. Level on 1/27 was 18.2    Recommendations:  - Obtain phenytoin level tomorrow  - Continue Keppra 1500mg Q12hrs  - Continue Phenytoin to 150/150/200mg   - Maintain seizure/fall/aspiration precautions      Discussed with Attending Dr. Ureña.

## 2025-01-27 NOTE — EEG REPORT - NS EEG TEXT BOX
[facility] Department of Neurology  Inpatient Continuous video-Electroencephalogram      Patient Name:	GARETT DELEON    :	1980  MRN:	[id number]    Study Start Date/Time:	2025, 7:55:33 PM  Study End Date/Time: in progress    Referred by:  Dr D’Amico    Brief Clinical History:  GARETT DELEON is a 44 year old Male; with hx of pontine hemorrhage epilepsy was consulted for hemifacial spasm and vertical eye movement concerning for worsening of seizures study performed to investigate for seizures or markers of epilepsy.   Diagnosis Code:  R56.9 convulsions/seizure    Pertinent Medication:  Phenytoin 150 /150/200 mg, Keppra 1500 mg bid,    Acquisition Details:  Electroencephalography was acquired using a minimum of 21 channels on an [employer] system v 9.3.1 with electrode placement according to the standard International 10-20 system following ACNS (American Clinical Neurophysiology Society) guidelines.  Anterior temporal T1 and T2 electrodes were utilized whenever possible.  The [employer] automated spike & seizure detections were all reviewed in detail, in addition to the entire raw EEG.    Findings:  Day 1:  2025, 7:55:33 PM to next morning at 07:00 AM   Meds: Phenytoin 150 /150/200 mg, Keppra 1500 mg bid,  Background:  continuous, with predominantly theta > delta frequencies.  Generalized Slowing:  None  Symmetry/Focality: No persistent asymmetries of voltage or frequency.       Voltage:  Low (most <20uV LBP Peak-Trough)  Organization:  Absent  Posterior Dominant Rhythm:  No clear PDR Choose an item.  Sleep:  Rudimentary but likely N2 transient  Variability:   Yes		Reactivity:  Yes    Spontaneous Activity:  No epileptiform discharges     Events:  1)	No electrographic seizures or significant clinical events occurred during this study.  Provocations:  •	Hyperventilation: was not performed.  •	Photic stimulation: was not performed.  Daily Summary:    1)	Moderate generalized slowing indicating similar degree of diffuse/multifocal cerebral dysfunction  2)	There were no epileptiform discharges or electrographic seizures    Bessie Martinez MD  CNP Fellow   [facility] Department of Neurology  Inpatient Continuous video-Electroencephalogram      Patient Name:	GARETT DELEON    :	1980  MRN:	[id number]    Study Start Date/Time:	2025, 7:55:33 PM  Study End Date/Time: in progress    Referred by:  Dr D’Amico    Brief Clinical History:  GARETT DELEON is a 44 year old Male; with hx of pontine hemorrhage epilepsy was consulted for hemifacial spasm and vertical eye movement concerning for worsening of seizures study performed to investigate for seizures or markers of epilepsy.   Diagnosis Code:  R56.9 convulsions/seizure    Pertinent Medication:  Phenytoin 150 /150/200 mg, Keppra 1500 mg bid,    Acquisition Details:  Electroencephalography was acquired using a minimum of 21 channels on an [employer] system v 9.3.1 with electrode placement according to the standard International 10-20 system following ACNS (American Clinical Neurophysiology Society) guidelines.  Anterior temporal T1 and T2 electrodes were utilized whenever possible.  The [employer] automated spike & seizure detections were all reviewed in detail, in addition to the entire raw EEG.    Findings:  Day 1:  2025, 7:55:33 PM to next morning at 07:00 AM ((no recording from 12:53 AM to 6:35 AM  Meds: Phenytoin 150 /150/200 mg, Keppra 1500 mg bid,  Background:  continuous, with predominantly theta > delta frequencies.  Generalized Slowing:  None  Symmetry/Focality: No persistent asymmetries of voltage or frequency.       Voltage:  Low (most <20uV LBP Peak-Trough)  Organization:  Absent  Posterior Dominant Rhythm:  No clear PDR Choose an item.  Sleep:  Rudimentary but likely N2 transient  Variability:   Yes		Reactivity:  Yes    Spontaneous Activity:  No epileptiform discharges     Events:  1)	No electrographic seizures or significant clinical events occurred during this study.  Provocations:  •	Hyperventilation: was not performed.  •	Photic stimulation: was not performed.  Daily Summary:    1)	Moderate generalized slowing indicating similar degree of diffuse/multifocal cerebral dysfunction  2)	There were no epileptiform discharges or electrographic seizures    Bessie Martinez MD  CNP Fellow

## 2025-01-28 LAB
ALBUMIN SERPL ELPH-MCNC: 3.2 G/DL — LOW (ref 3.3–5)
ALP SERPL-CCNC: 193 U/L — HIGH (ref 40–120)
ALT FLD-CCNC: 24 U/L — SIGNIFICANT CHANGE UP (ref 10–45)
ANION GAP SERPL CALC-SCNC: 11 MMOL/L — SIGNIFICANT CHANGE UP (ref 5–17)
AST SERPL-CCNC: 12 U/L — SIGNIFICANT CHANGE UP (ref 10–40)
BASOPHILS # BLD AUTO: 0.06 K/UL — SIGNIFICANT CHANGE UP (ref 0–0.2)
BASOPHILS NFR BLD AUTO: 0.9 % — SIGNIFICANT CHANGE UP (ref 0–2)
BILIRUB SERPL-MCNC: 0.2 MG/DL — SIGNIFICANT CHANGE UP (ref 0.2–1.2)
BUN SERPL-MCNC: 38 MG/DL — HIGH (ref 7–23)
CALCIUM SERPL-MCNC: 9 MG/DL — SIGNIFICANT CHANGE UP (ref 8.4–10.5)
CHLORIDE SERPL-SCNC: 101 MMOL/L — SIGNIFICANT CHANGE UP (ref 96–108)
CO2 SERPL-SCNC: 26 MMOL/L — SIGNIFICANT CHANGE UP (ref 22–31)
CREAT SERPL-MCNC: 1.12 MG/DL — SIGNIFICANT CHANGE UP (ref 0.5–1.3)
EGFR: 82 ML/MIN/1.73M2 — SIGNIFICANT CHANGE UP
EGFR: 82 ML/MIN/1.73M2 — SIGNIFICANT CHANGE UP
EOSINOPHIL # BLD AUTO: 0.45 K/UL — SIGNIFICANT CHANGE UP (ref 0–0.5)
EOSINOPHIL NFR BLD AUTO: 6.9 % — HIGH (ref 0–6)
GLUCOSE SERPL-MCNC: 116 MG/DL — HIGH (ref 70–99)
HCT VFR BLD CALC: 30.5 % — LOW (ref 39–50)
HCT VFR BLD CALC: 32.3 % — LOW (ref 39–50)
HGB BLD-MCNC: 10 G/DL — LOW (ref 13–17)
HGB BLD-MCNC: 9.6 G/DL — LOW (ref 13–17)
IMM GRANULOCYTES NFR BLD AUTO: 0.5 % — SIGNIFICANT CHANGE UP (ref 0–0.9)
LACTATE SERPL-SCNC: 1.2 MMOL/L — SIGNIFICANT CHANGE UP (ref 0.5–2)
LYMPHOCYTES # BLD AUTO: 1.49 K/UL — SIGNIFICANT CHANGE UP (ref 1–3.3)
LYMPHOCYTES # BLD AUTO: 22.9 % — SIGNIFICANT CHANGE UP (ref 13–44)
MAGNESIUM SERPL-MCNC: 2 MG/DL — SIGNIFICANT CHANGE UP (ref 1.6–2.6)
MCHC RBC-ENTMCNC: 29.4 PG — SIGNIFICANT CHANGE UP (ref 27–34)
MCHC RBC-ENTMCNC: 29.9 PG — SIGNIFICANT CHANGE UP (ref 27–34)
MCHC RBC-ENTMCNC: 31 G/DL — LOW (ref 32–36)
MCHC RBC-ENTMCNC: 31.5 G/DL — LOW (ref 32–36)
MCV RBC AUTO: 95 FL — SIGNIFICANT CHANGE UP (ref 80–100)
MCV RBC AUTO: 95 FL — SIGNIFICANT CHANGE UP (ref 80–100)
MONOCYTES # BLD AUTO: 0.71 K/UL — SIGNIFICANT CHANGE UP (ref 0–0.9)
MONOCYTES NFR BLD AUTO: 10.9 % — SIGNIFICANT CHANGE UP (ref 2–14)
MRSA PCR RESULT.: NEGATIVE — SIGNIFICANT CHANGE UP
NEUTROPHILS # BLD AUTO: 3.77 K/UL — SIGNIFICANT CHANGE UP (ref 1.8–7.4)
NEUTROPHILS NFR BLD AUTO: 57.9 % — SIGNIFICANT CHANGE UP (ref 43–77)
NRBC # BLD: 0 /100 WBCS — SIGNIFICANT CHANGE UP (ref 0–0)
NRBC # BLD: 0 /100 WBCS — SIGNIFICANT CHANGE UP (ref 0–0)
NRBC BLD-RTO: 0 /100 WBCS — SIGNIFICANT CHANGE UP (ref 0–0)
NRBC BLD-RTO: 0 /100 WBCS — SIGNIFICANT CHANGE UP (ref 0–0)
PHENYTOIN FREE SERPL-MCNC: 16.9 UG/ML — SIGNIFICANT CHANGE UP (ref 10–20)
PHOSPHATE SERPL-MCNC: 3.4 MG/DL — SIGNIFICANT CHANGE UP (ref 2.5–4.5)
PLATELET # BLD AUTO: 191 K/UL — SIGNIFICANT CHANGE UP (ref 150–400)
PLATELET # BLD AUTO: 217 K/UL — SIGNIFICANT CHANGE UP (ref 150–400)
POTASSIUM SERPL-MCNC: 3.6 MMOL/L — SIGNIFICANT CHANGE UP (ref 3.5–5.3)
POTASSIUM SERPL-SCNC: 3.6 MMOL/L — SIGNIFICANT CHANGE UP (ref 3.5–5.3)
PROCALCITONIN SERPL-MCNC: 0.18 NG/ML — HIGH (ref 0.02–0.1)
PROT SERPL-MCNC: 6.9 G/DL — SIGNIFICANT CHANGE UP (ref 6–8.3)
RAPID RVP RESULT: SIGNIFICANT CHANGE UP
RBC # BLD: 3.21 M/UL — LOW (ref 4.2–5.8)
RBC # BLD: 3.4 M/UL — LOW (ref 4.2–5.8)
RBC # FLD: 13.2 % — SIGNIFICANT CHANGE UP (ref 10.3–14.5)
RBC # FLD: 13.2 % — SIGNIFICANT CHANGE UP (ref 10.3–14.5)
S AUREUS DNA NOSE QL NAA+PROBE: NEGATIVE — SIGNIFICANT CHANGE UP
SARS-COV-2 RNA SPEC QL NAA+PROBE: SIGNIFICANT CHANGE UP
SODIUM SERPL-SCNC: 138 MMOL/L — SIGNIFICANT CHANGE UP (ref 135–145)
WBC # BLD: 6.51 K/UL — SIGNIFICANT CHANGE UP (ref 3.8–10.5)
WBC # BLD: 7.94 K/UL — SIGNIFICANT CHANGE UP (ref 3.8–10.5)
WBC # FLD AUTO: 6.51 K/UL — SIGNIFICANT CHANGE UP (ref 3.8–10.5)
WBC # FLD AUTO: 7.94 K/UL — SIGNIFICANT CHANGE UP (ref 3.8–10.5)

## 2025-01-28 PROCEDURE — 99233 SBSQ HOSP IP/OBS HIGH 50: CPT

## 2025-01-28 PROCEDURE — 99231 SBSQ HOSP IP/OBS SF/LOW 25: CPT

## 2025-01-28 RX ORDER — VANCOMYCIN HCL IN 5 % DEXTROSE 1.5G/250ML
1250 PLASTIC BAG, INJECTION (ML) INTRAVENOUS EVERY 12 HOURS
Refills: 0 | Status: DISCONTINUED | OUTPATIENT
Start: 2025-01-28 | End: 2025-01-28

## 2025-01-28 RX ORDER — PIPERACILLIN-TAZO-DEXTROSE,ISO 3.375G/5
4.5 IV SOLUTION, PIGGYBACK PREMIX FROZEN(ML) INTRAVENOUS EVERY 8 HOURS
Refills: 0 | Status: DISCONTINUED | OUTPATIENT
Start: 2025-01-28 | End: 2025-01-29

## 2025-01-28 RX ORDER — PIPERACILLIN-TAZO-DEXTROSE,ISO 3.375G/5
3.38 IV SOLUTION, PIGGYBACK PREMIX FROZEN(ML) INTRAVENOUS ONCE
Refills: 0 | Status: COMPLETED | OUTPATIENT
Start: 2025-01-28 | End: 2025-01-28

## 2025-01-28 RX ORDER — PIPERACILLIN-TAZO-DEXTROSE,ISO 3.375G/5
3.38 IV SOLUTION, PIGGYBACK PREMIX FROZEN(ML) INTRAVENOUS EVERY 8 HOURS
Refills: 0 | Status: DISCONTINUED | OUTPATIENT
Start: 2025-01-28 | End: 2025-01-28

## 2025-01-28 RX ADMIN — LEVETIRACETAM 1500 MILLIGRAM(S): 10 INJECTION, SOLUTION INTRAVENOUS at 05:17

## 2025-01-28 RX ADMIN — LEVETIRACETAM 1500 MILLIGRAM(S): 10 INJECTION, SOLUTION INTRAVENOUS at 17:56

## 2025-01-28 RX ADMIN — Medication 40 MILLIGRAM(S): at 11:51

## 2025-01-28 RX ADMIN — OFLOXACIN 1 DROP(S): 3 SOLUTION OPHTHALMIC at 00:57

## 2025-01-28 RX ADMIN — HEPARIN SODIUM 5000 UNIT(S): 1000 INJECTION INTRAVENOUS; SUBCUTANEOUS at 05:19

## 2025-01-28 RX ADMIN — HEPARIN SODIUM 5000 UNIT(S): 1000 INJECTION INTRAVENOUS; SUBCUTANEOUS at 11:50

## 2025-01-28 RX ADMIN — IPRATROPIUM BROMIDE AND ALBUTEROL SULFATE 3 MILLILITER(S): .5; 2.5 SOLUTION RESPIRATORY (INHALATION) at 17:53

## 2025-01-28 RX ADMIN — BACLOFEN 5 MILLIGRAM(S): 10 INJECTION INTRATHECAL at 17:56

## 2025-01-28 RX ADMIN — OFLOXACIN 1 DROP(S): 3 SOLUTION OPHTHALMIC at 23:05

## 2025-01-28 RX ADMIN — Medication 1 APPLICATION(S): at 05:22

## 2025-01-28 RX ADMIN — IPRATROPIUM BROMIDE AND ALBUTEROL SULFATE 3 MILLILITER(S): .5; 2.5 SOLUTION RESPIRATORY (INHALATION) at 23:13

## 2025-01-28 RX ADMIN — FLUTICASONE PROPIONATE 1 SPRAY(S): 50 SPRAY, METERED NASAL at 17:55

## 2025-01-28 RX ADMIN — DOXAZOSIN MESYLATE 8 MILLIGRAM(S): 8 TABLET ORAL at 23:02

## 2025-01-28 RX ADMIN — Medication 1 SPRAY(S): at 17:55

## 2025-01-28 RX ADMIN — Medication 1 DROP(S): at 10:43

## 2025-01-28 RX ADMIN — Medication 15 MILLILITER(S): at 05:20

## 2025-01-28 RX ADMIN — Medication 166.67 MILLIGRAM(S): at 02:50

## 2025-01-28 RX ADMIN — Medication 200 GRAM(S): at 01:48

## 2025-01-28 RX ADMIN — ACETYLCYSTEINE 4 MILLILITER(S): 200 INHALANT RESPIRATORY (INHALATION) at 05:15

## 2025-01-28 RX ADMIN — BACLOFEN 5 MILLIGRAM(S): 10 INJECTION INTRATHECAL at 05:15

## 2025-01-28 RX ADMIN — IPRATROPIUM BROMIDE AND ALBUTEROL SULFATE 3 MILLILITER(S): .5; 2.5 SOLUTION RESPIRATORY (INHALATION) at 11:50

## 2025-01-28 RX ADMIN — Medication 25 GRAM(S): at 23:02

## 2025-01-28 RX ADMIN — Medication 150 MILLIGRAM(S): at 14:09

## 2025-01-28 RX ADMIN — ACETYLCYSTEINE 4 MILLILITER(S): 200 INHALANT RESPIRATORY (INHALATION) at 23:13

## 2025-01-28 RX ADMIN — Medication 1 DROP(S): at 23:54

## 2025-01-28 RX ADMIN — ACETYLCYSTEINE 4 MILLILITER(S): 200 INHALANT RESPIRATORY (INHALATION) at 00:07

## 2025-01-28 RX ADMIN — HEPARIN SODIUM 5000 UNIT(S): 1000 INJECTION INTRAVENOUS; SUBCUTANEOUS at 23:02

## 2025-01-28 RX ADMIN — OFLOXACIN 1 DROP(S): 3 SOLUTION OPHTHALMIC at 17:53

## 2025-01-28 RX ADMIN — OFLOXACIN 1 DROP(S): 3 SOLUTION OPHTHALMIC at 05:21

## 2025-01-28 RX ADMIN — FLUTICASONE PROPIONATE 1 SPRAY(S): 50 SPRAY, METERED NASAL at 05:20

## 2025-01-28 RX ADMIN — Medication 1 APPLICATION(S): at 17:55

## 2025-01-28 RX ADMIN — Medication 1 DROP(S): at 05:19

## 2025-01-28 RX ADMIN — Medication 25 GRAM(S): at 05:15

## 2025-01-28 RX ADMIN — Medication 1 DROP(S): at 03:11

## 2025-01-28 RX ADMIN — OFLOXACIN 1 DROP(S): 3 SOLUTION OPHTHALMIC at 11:49

## 2025-01-28 RX ADMIN — Medication 1 DROP(S): at 13:38

## 2025-01-28 RX ADMIN — ERYTHROMYCIN 1 APPLICATION(S): 5 OINTMENT OPHTHALMIC at 23:14

## 2025-01-28 RX ADMIN — Medication 200 MILLIGRAM(S): at 20:07

## 2025-01-28 RX ADMIN — ACETYLCYSTEINE 4 MILLILITER(S): 200 INHALANT RESPIRATORY (INHALATION) at 17:53

## 2025-01-28 RX ADMIN — Medication 40 MILLIEQUIVALENT(S): at 07:20

## 2025-01-28 RX ADMIN — Medication 1 DROP(S): at 17:54

## 2025-01-28 RX ADMIN — ACETYLCYSTEINE 4 MILLILITER(S): 200 INHALANT RESPIRATORY (INHALATION) at 11:50

## 2025-01-28 RX ADMIN — Medication 25 GRAM(S): at 13:40

## 2025-01-28 RX ADMIN — Medication 1 SPRAY(S): at 05:21

## 2025-01-28 RX ADMIN — Medication 150 MILLIGRAM(S): at 03:43

## 2025-01-28 RX ADMIN — Medication 1 APPLICATION(S): at 05:49

## 2025-01-28 RX ADMIN — Medication 15 MILLILITER(S): at 17:55

## 2025-01-28 RX ADMIN — AMLODIPINE BESYLATE 5 MILLIGRAM(S): 10 TABLET ORAL at 05:17

## 2025-01-28 RX ADMIN — IPRATROPIUM BROMIDE AND ALBUTEROL SULFATE 3 MILLILITER(S): .5; 2.5 SOLUTION RESPIRATORY (INHALATION) at 05:19

## 2025-01-28 RX ADMIN — IPRATROPIUM BROMIDE AND ALBUTEROL SULFATE 3 MILLILITER(S): .5; 2.5 SOLUTION RESPIRATORY (INHALATION) at 00:07

## 2025-01-28 NOTE — PROGRESS NOTE ADULT - SUBJECTIVE AND OBJECTIVE BOX
HPI:  45 yo M, unknown past medical history (reported stroke and MI by coworkers) presented to [facility] with AMS, Pt was working at [employer] when he was found down by coworkers. EMS called and pt brought to [facility] ED. Intubated, sedated, started on cardene for SBPs in 200s. CT head showed brain stem bleed. Transferred to NSICU for further management.  (30 Sep 2024 12:55)    S/Overnight events:  Febrile to 101F rectal, pan culture sent. Started on vanc/zosyn for empiric coverage. Possible left sided infiltrate. +UTI on UA.       Hospital Course:   : transferred from [facility]. A line placed. Versed dc'd. Roomate Prasanth at bedside, states pt has family in [place], cannot confirm medications or PMH other than stroke and MI. 250cc bolus 3% given. LR switched to NS. hydralazine 25q8 started, 3% started, switched propofol to precedex   10/1: stability CTH done. Added labetalol, started TF. Palliative consulted. ethics consulted to determine surrogate. febrile 103, pan cx sent  10/2: BD 2, GEORGE overnight. TF resumed. Desatt'd to 80s, FiO2 inc. to 50. Fentanyl given, ABG, CXR ordered. Maxxed on precedex, started on propofol for DARIEN -4 - -5. Precedex dc'd. Duonebs, mucomyst, hypertonic added. 3% dc'd. Cardene dc'd. Start vanc/CTX. Increased labetalol 200q8. MRSA negative, dc'd vanc. ETT pulled back 2cm x 2, good positioning after confirmatory chest xray. Ethics attempting to establish HCP with family. Na 159, starting FW 250q6 for range 150-155.   10/3: BD3, GEORGE o/n, neuro stable. Na elevating, FW increased to 300q6. Dc'd bowel reg for diarrhea. vEEG started. SQH 5000q8 tonight.   10/4: BD 4, albumn bolus, incr. LR to 80 2/2 incr. in Cr, LR to 10 0cc/hr for uptrending Cr. Started 7% hypersal for 48hrs and SL atropine for inline/oral thick secretions. Dc'd CTX and started ancef for MSSA in the sputum. Nephrology consulted for CKD, f/u recs. SBP 170s, given hydralazine 10mg IVP.   10/5: BD5, o/n 10mg IVP hydralazine given for SBP 170s and started on hydralazine 25q8 via OGT. 10mg IV push labetalol for SBP > 160s. RT placed for diarrhea.   10/6: BD6, o/n FW increased to 350q4 per nephrology recs. IV tylenol for temp 100.6, SBp 160s presumed uncomfortable.   10/7: BD7, overnight pancultured for temp 101.8F.   10/8: BD8. GEORGE. Cr bumped. decreased LR to 75cc/hr. Adding simethicone ATC. incr hydralazine 50mgTID. Incr labetalol 300mgTID. Na 145, decreased FWF to 250q6. Start precedex. FENa consistent with intrinsic kidney injury. Pend repeat renal US. Retaining up to 1.3L, bladder scans q6, straight cath PRN  10/9: BD 9. GEORGE overnight. Neuro stable. abd xray for distention w non-specific gas pattern, OGT to LIWS for morning. duonebs/mucomyst to q8 for improving secretions. Changed tube feeds to Jevity 1.5 20cc/hr, low rate due to abdominal distention, nepro dense and more difficult to digest. Tolerating CPAP, confirmed by ABG.   10/10: BD 10. GEORGE overnight. Neuro stable. (+) gabriel for urinary retention on bladder scan. inc TF to goal rate of 40cc/hr. family leaning toward pursuing trach/PEG. 1/2 amp for FS 81.   10/11: BD 11. GEORGE overnight. Neuro stable. Trach/PEG consults placed.   10/12: BD 12. GEORGE overnight. Neuro stable. MRI brain complete.   10/13: BD 13. Increase flomax. Hold SQH after PM dose for trach tm. IVL.   10/14: BD 14. GEORGE overnight, remains on AC/VC. Gabriel placed for urinary retention. Dc'd free water.  S/p trach with pulm. NGT placed and CXR confirmed in good position.   10/15: BD 15, GEORGE ovn. resumed feeds. spiked 101, pan cx sent.   10/16: BD 16. GEORGE ovn. Lokelma 5mg for K+ 5.4. Started vanc q 24/zosyn for empiric PNA coverage, IVF to 100/hr. PEG held for fever.   10/17: BD 17,  ordered serum osm and urine osm for am. Started sinemet for neurostimulation. Increased cardura to 0.8. Started FW 100q4, dc'd IVF. MRSA negative, dc'd vanc. NGT replaced d/t coiling.   10/18: BD 18, GEORGE overnight, neuro stable. Amantadine added for neurostim. zosyn changed to unasyn for acinetobacter baumannii, failed TOV and required SC  10/19: BD 19, GEORGE ovn. cardura 2mg added for retention. labetalol decreased 200q8, hydralazine decreased 25q8. Gabriel replaced.   10/20: BD20, GEORGE overnight. NGT dislodged, replaced. PEG tomorrow w/ gen surg, FW increased to 150q4 and labetalol decreased to 100q8, lokelma given for hyperkalemia.   10/21: BD 21. POD0 PEG placement with Gen surg. decr labetolol to 50q8, incr. cardura to 0.4, started lokelma and phoslo, dc gabriel POD0 PEG placement with Gen surg.  10/22: BD 22. Plan to start TF today via PEG. dc labetalol, Following ophtho recs. Increased apnea settings - found to be in cheyne-moe respiration. CPAP .  10/23: BD 23. hydralazine d/c'd, trach collar trial today. Rectal tube placed at 6am.  10/24: BD24, o/n lokelma held due to diarrhea. Free water 100q6 resumed. dc'd tamsulosin, amantadine. Incr'd cardura to 8mg qhs. Dc'd FW. Switched jevity to nepro. gabriel placed for high urine output. Started SL atropine for oral secretions. Dc'd free water.  10/25: BD25, o/n decreased suctioning requirements to > q4hrs, GEORGE. Cr improving, cont phoslo, lokelma held at this time. Gabriel placed yest, cont. Tolerating trach collar. Given 500cc plasmalyte bolus for ANIKA. Dc'd sinemet.   10/26: BD26, o/n resumed lokelma 5mg daily and resumed 100cc free water q6hrs. Change in neuro status with new right pupillary dilation with anisocoria (right pupil 6mm fixed and left pupil 3mm briskly reactive). Given 23.4% NaCl bullet, taken for emergent CTH showing mostly resolved pontine hemorrhage, continued brainstem hypodensity likely edema d/t hemorrhage, no new hemorrhage or infarct, no herniation, mild increase in size of left lateral ventricle. Vitals remaine stable. Na goal > 140.   10/27: BD27, o/n GEORGE.Neuro stable. Pend stepdown with airway bed.   10/28: BD 28. GEORGE overnight. Neuro stable. Miralax ordered. Gabriel removed, pending TOV.  10/29: BD 29. GEORGE o/n. Given 2L NS over 8 hrs for increased BUN/Cr ratio. Gabriel placed for frequent straight cath.   10/30: BD 30.   10/31: BD 31. GEORGE overnight. Na 149, increased free water to 200q6. 1L NS for uptrending BUN.   : BD 32. GEORGE overnight. Given 1L NS for dehydration. Na 146, increased FW to 250q6.   : BD 33 GEORGE overnight, neuro stable, given 500cc bolus for net negative status and tachycardia   11/3: BD 34, GEORGE overnight, neuro stable. Patient remains tachycardic, EKG showing sinus tachycardia, given additional 500cc NS bolus. Febrile to 101.9F, pan cultured (without UA), CXR WNL, given tylenol.   : BD 35, GEORGE overnight, neuro stable. Given 1L NS for tachycardia. sputum (+) for stenotropohomas maltophilia.   : BD 36 GEORGE overnight, neuro stable. Vancomycin dc'd. Chest PT BID. ID consulted, cont zosyn.  : BD 37. blood cx + klebsiella dc zosyn changed to cefepime, CTAP ordered, rpt blood cx sent.    : BD 38. Pending CT A/P, given 250cc bolus and starting maintenance fluids overnight. Pending CT A/P after bolus   : BD 39. CT CAP negative for infection.   : BD 40. GEORGE overnight.  11/10: BD 41. GEORGE overnight. desat to 85 on trach collar, O2 inc to 10L and 100%, O2 sat inc to 95. pt tachy to 110s, euvolemic. given tylenol. ABG and CXR ordered. spiked fever, pancultured, RVP negative. AM ABG w pO2 79, rpt w pO2 79. pt appears comfortable, satting 94%.   : BD 42. GEORGE overnight. pt became tachy to 130s, desat to 90 on 100% FiO2 and 10L. suctioned, (+) productive cough. temp 101.4, given 1g IV tylenol and 500cc NS bolus for euvolemia. fever and HR downtrending. LE dopplers negative for dvt  : BD 43, GEORGE ovn, fever and HR downtrending, satting 97% 70% FIO2  : BD 44, GEORGE ovn. started standing tylenol x24 hours for tachycardia. desat to 80s, o2 increased. CXR stable, pending CTA PE protocol.   : BD 45, GEORGE overnight, neuro stable. resp therapy dec FiO2 to 70%.   11/15: BD 46, GEORGE overnight, neuro stable.  Rapid called for desaturation 30s, tachycardic 140s. Patient bagged, 100% fio2, heavily suctioned. CXR/POCUS unremarkable. ABG c/w desaturation. WBC 14.71. Afebrile. O2 improved to 90s and patient upgraded to ICU. ABG paO2 30s improved to 89 on vent. IV Tylenol x 1, sputum sent. Start protonix while o-n vent.   : BD 47. POCUS showed collapsable IVCF, given 1L bolus. Vanco/Cefpime added empriic for PNA, NGT feeds restarted. MRSA swab neg, Vanc DC'd.   : BD 48. GEORGE overnight. 1l bolus for tachycardia. Spiked to 101, cultured. 500cc bolus for tachycardia, tachy to 148 given 25mcg fentanyl, 250cc albumin, 1.5L bolus. 5 IV lopressor with response HR to 100s. +Stenotrophomonas on sputum cx.   : BD 49. GEORGE overnight. Consulted ID, cefepime switched to bactrim x7days. Started hydrochlorothiazine 12.5mg daily.  : BD 50. Tachy 120s, given tylenol and 500cc NS. Tolerating 5/5, switched to TCx. Holding phos binder. D/c Bactrim. D/c gabriel, f/u TOV. Dc'd PPI.   : BD 51. GEORGE ovn. 1600 satting low 90s, mildly tachy to 110s, afebrile, RR wnl. O2 improved to mid 90s while inc O2 to 100% on TCx. CPAP 5/5 placed back on.  : BD 52, GEORGE ovn, tolerating CPAP 5/5. Switch to trach collar during the day if tolerating well. HCTZ held for Cr bump, straight cath frequence increased to q4  : BD 53, GEORGE ovn. Resumed phoslo. Gabriel placed. Resumed HCTZ.   : BD 54. Holding tylenol in setting of possible fever, will require pan cx if febrile. Cr improved today. Cont CPAP. Bowel regimen held i/s/o diarrhea. FOBT negative.  : BD 55. GEORGE overnight. Neuro stable. HCTZ dc'ed, started lisinopril 5. Lokelma dc'ed for K 3.7.   : BD 56, GEORGE overnight. Neuro stable. dc'd lisinopril 5mg. Gabriel dc'd. TOV. 1545 noted to be hypotensive, MAP 50, in supine position on chair, HR 60s, afebrile, O2 96%. Given 1L cc NS bolus, placed back on bed in reverse trendelenberg, improved to map of 66. Neostick at bedside. Vitals check q1h. Dc'ed amlodipine. Failed TOV, bladder scan q6, sc prn. Added back Senna.   : BD 57, GEORGE overnight. Neuro stable. Dc'd phoslo.   : BD 58, GEORGE overnight. Neuro stable.    : BD 59. Gabriel replaced.   : GEORGE.  : GEORGE, neuro stable.   : BD 62, GEORGE overnight  : BD 63, GEORGE overnight.; Given 1L bolus of LR for uptrending BUN/Cr.  12/3: BD 64. Reinstated eye gtt/moisture chamber given increased Rt eye injection  : BD 65. GEORGE overnight. Attempted to speak with ophtho regarding eyelid weight/closure but no answer, full mailbox.   : BD 66, GEORGE overnight, bowel regimen increased and had BM.   : BD 67, GEORGE overnight, neuro stable.  : GEORGE overnight, neuro stable. /110s, given x1 hydralazine 10 mg IVP. Restarted home amlodipine 5mg.  : GEORGE. OOB to chair.     : GEORGE, mucomyst added for thick secretions, simethicone for abd distension, abd xray with stool burden, increased bowel regimen.   : GEORGE overnight.   : GEORGE overnight.   : GEORGE overnight  12/15: o/n Patient became tachycardic HR 120s and 10 minutes later O2 sat dropped as low as 89%. Patient suctioned without improvement in O2 sat and tube feeds found in suction catheter. TFs held.  STAT CXR ordered. STAT labs sent. Respiratory therapist called to bedside and patient trach connected to ventilator. After connection to ventilator and further suctioning O2 sat improved to 97% but patient HR remains 120-130s. Upgraded to NSICU for further management. Vancomycin and zosyn started. CTA  chest PE protocol and CTH ordered. Blood cultures sent.given 500cc bolus, rpt ABG sent pO2 243, CTH and CTA chest done. FS while NPO, FiO2 dec 50 pending ABG. sputum cx positive for few GPC and GNR.   : GEORGE overnight, restarted amlodipine, troponin 75   : GEORGE overnight, neuro stable. Attempted CPAP this morning, did not tolerate and back on full vent support. Dc'd Vanc. Tachycardia to 140s, noted extremities to be twitching along with jaw twitching. Given 2g of Keppra, total 4mg ativan and placed on EEG, full set of labs and lactate negative. Resumed trickle feeds at 20cc/hr. Given 250cc albumin for tachycardia.   : GEORGE overnight. Neuro stable. CTH stable. EEG negative and dc'd.   : GEORGE overnight. neuro stable. SIMV most of day, AC/VC at night.   : GEORGE overnight, neuro stable. remains on VC/AC  : GEORGE ovn. 1L bolus for tachy to 120s-130s.   : GEORGE ovn. Tachy to 120s, given tylenol and IVF. 2mg IV ativan given for L jaw twitching. Sx resolved for 3 minutes and started again. Epilepsy contacted. Given 2mg IV ativan. Continued twitching. Increased keppra to 1500mg BID, 2mg ativan given. Propranolol started for refractory tachycardia. vEEG ordered. albumin given. POCUS performed, no b lines. Started on fosphenytoin, loaded w 20mg/kg then 100mg TID. febrile, pancx, started cefepime 2g q8, vancomycin  : GEORGE ovn. +focal motor seizures on eeg. ID consulted. Vancomycin dc'ed as per ID.  : GEORGE ovn, neuro stable. Baclofen 5 mg q12 started for hiccups. Na 129 from 134. Urine lytes c/w SIADH. Given 250cc 3% bolus. CT chest for infection w/u - f/u read. Repeat Na 136. UA negative  : GEORGE ovn.   : GEORGE ovn  : GEORGE overnight. Blood cx neg @ 4 days, DC cefepime per medicine (sputum colonized).   : Desaturation o/n to 80's, CXR obtained, pulse ox changed and sats resolved. Na 133 from 138, 250cc 3% bolus given. Cefepime resumed until  per ID. Rpt Na 138.  : GEORGE overnight. Na stable.   : GEORGE overnight. Family meeting with son, pt now DNR.   : GEORGE overnight.   : GEORGE overnight, neuro stable.  : GEORGE overnight, neuro stable. FW decreased to 100q6.   1/3: GEORGE overnight, neuro stable. fosphenytoin IV changed to pheytoin PO via PEG per epilepsy recommendations  : GEORGE overnight, neuro stable. FW incr. to 100q4  : GEORGE overnight, neuro stable.   : GEORGE overnight, neuro stable   : GEORGE overnight, neuro stable. BUN/Cr increased, increased FW to 200q6. Given 1L bolus of LR over 2 hours. Gabriel d/c'd, voiding, continue bladder scan q6.   : GEORGE overnight, neuro stable. BUN/Cr improving.  : GEORGE overnight, neuro stable.   1/10: GEORGE overnight, neuro stable.   : GEORGE overnight, neuro stable, vent settings stable.   : GEORGE overnight, neuro stable. Febrile to 102F, f/u pan cx, chest xray, given tylenol. Zosyn started.   : GEORGE overnight, neuro stable, cont Zosyn for presumed PNA, prelim sputum cx growing; few GS neg diplococci, mod GS neg rods, rare GS + rods, fever trend improved. Free water increased to 898opo2.   : GEORGE overnight. pending renal US for elevated Cr  1/15: GEORGE ovn. renal US complete.   : GEORGE overnight, neuro stable   : GEORGE overnight, neuro stable   : GEORGE overnight, neuro stable.   : GEORGE overnight, neuro stable. Propranolol dec to 5q8.   : GEORGE overnight, neuro stable. Weaned propranolol to 5mg BID.   : GEORGE ovn, in AM having rapid vertical eye movements with facial twitching, 2g total keppra given for AM dose and phenytoin level ordered, vital signs stable. CTH stable. Phenytoin increased to 100/100/150mg per epilepsy  : GEORGE ovn. IV hydral x 1 for SBP 170s.   : Cont to have focal motor seizures. Per epilepsy, changed phenytoin dose to 100/100/200.   : Phenytoin lvl tmrw AM. Chest Xray completed due to temp 100.2F  : GEORGE overnight. Incr dilantin morning and afternoon per epilepsy.   : GEORGE overnight. Sustained focal twitching noticed by nursing. Ativan 8mg in total given. Fosphenytoin 1500mg IV given. Placed on EEG. Epilepsy following. TFs held. Phenytoin increased to 150/150/200.   : o/n CTH completed due to continued lethargy after ativan given yesterday afternoon. TFs resumed. 500cc bolus NS given for SBP 90s. EEG dc'ed, discussed w/ Dr. Tomlin. Febrile 101F, pan cx sent. Likely left sided infiltrate on CXR, c/f aspiration PNA. Started on vanc/zosyn. MRSA swab pending.   : Neuro stable, on vanc/zosyn. +UTI on UA.        Vital Signs Last 24 Hrs  T(C): 38.3 (2025 22:20), Max: 38.3 (2025 22:20)  T(F): 101 (2025 22:20), Max: 101 (2025 22:20)  HR: 78 (2025 01:52) (78 - 96)  BP: 102/66 (2025 00:20) (93/59 - 117/83)  BP(mean): 78 (2025 00:20) (72 - 96)  RR: 16 (2025 01:52) (14 - 17)  SpO2: 99% (2025 01:52) (95% - 100%)    Parameters below as of 2025 01:52  Patient On (Oxygen Delivery Method): ventilator    O2 Concentration (%): 40    I&O's Detail    2025 07:01  -  2025 07:00  --------------------------------------------------------  IN:    Enteral Tube Flush: 120 mL    Free Water: 800 mL    Miscellaneous Tube Feedin mL    sodium chloride 0.9%: 360 mL    Sodium Chloride 0.9% Bolus: 500 mL  Total IN: 2500 mL    OUT:    Voided (mL): 1110 mL  Total OUT: 1110 mL    Total NET: 1390 mL      2025 07:01  -  2025 03:46  --------------------------------------------------------  IN:    Enteral Tube Flush: 360 mL    Miscellaneous Tube Feedin mL  Total IN: 1320 mL    OUT:    Free Water: 0 mL    Intermittent Catheterization - Urethral (mL): 400 mL    Oral Fluid: 0 mL    Voided (mL): 1095 mL  Total OUT: 1495 mL    Total NET: -175 mL        I&O's Summary    2025 07:  -  2025 07:00  --------------------------------------------------------  IN: 2500 mL / OUT: 1110 mL / NET: 1390 mL    2025 07:01  -  2025 03:46  --------------------------------------------------------  IN: 1320 mL / OUT: 1495 mL / NET: -175 mL        PHYSICAL EXAM:  General: patient seen laying supine in bed in NAD  Neuro: opens eyes to voice, does not follow commands, RUE trace withdrawal to noxious, b/l lower extremities withdraw,  LUE 0/5, mild mouth and R eye twitching  HEENT: PERRL, +trach to vent  Neck: supple  Cardiac: RRR, S1S2  Pulmonary: chest rise symmetric  Abdomen: soft, nontender, nondistended, +PEG  Ext: perfusing well  Skin: warm, dry    TUBES/LINES:  [] CVC  [] A-line  [] Lumbar Drain  [] Ventriculostomy  [] Other    DIET:  [] NPO  [] Mechanical  [x] Tube feeds    LABS:    Urinalysis Basic - ( 2025 23:20 )    Color: Yellow / Appearance: Turbid / S.015 / pH: x  Gluc: x / Ketone: Negative mg/dL  / Bili: Negative / Urobili: 1.0 mg/dL   Blood: x / Protein: 100 mg/dL / Nitrite: Negative   Leuk Esterase: Large / RBC: 2 /HPF / WBC >998 /HPF   Sq Epi: x / Non Sq Epi: 11 /HPF / Bacteria: Too Numerous to count /HPF          CAPILLARY BLOOD GLUCOSE          Drug Levels: [] N/A  Phenytoin Level, Serum: 18.2 ug/mL ( @ 05:30)  Phenytoin Level, Serum: 3.5 ug/mL ( @ 05:30)  Phenytoin Level, Serum: 4.6 ug/mL ( @ 05:30)    CSF Analysis: [] N/A      Allergies    Allergy Status Unknown    Intolerances      MEDICATIONS:  Antibiotics:  piperacillin/tazobactam IVPB.- 3.375 Gram(s) IV Intermittent once  piperacillin/tazobactam IVPB.. 3.375 Gram(s) IV Intermittent every 8 hours  vancomycin  IVPB 1250 milliGRAM(s) IV Intermittent every 12 hours    Neuro:  acetaminophen   Oral Liquid .. 650 milliGRAM(s) Oral every 6 hours PRN  baclofen 5 milliGRAM(s) Oral every 12 hours  levETIRAcetam 1500 milliGRAM(s) Oral two times a day  phenytoin   Suspension 150 milliGRAM(s) Oral <User Schedule>  phenytoin   Suspension 200 milliGRAM(s) Oral <User Schedule>    Anticoagulation:  heparin   Injectable 5000 Unit(s) SubCutaneous every 8 hours    OTHER:  acetylcysteine 10%  Inhalation 4 milliLiter(s) Inhalation every 6 hours  albuterol/ipratropium for Nebulization 3 milliLiter(s) Nebulizer every 6 hours  amLODIPine   Tablet 5 milliGRAM(s) Oral daily  artificial tears (preservative free) Ophthalmic Solution 1 Drop(s) Right EYE every 4 hours  chlorhexidine 0.12% Liquid 15 milliLiter(s) Oral Mucosa every 12 hours  chlorhexidine 2% Cloths 1 Application(s) Topical <User Schedule>  doxazosin 8 milliGRAM(s) Oral at bedtime  erythromycin   Ointment 1 Application(s) Right EYE at bedtime  fluticasone propionate 50 MICROgram(s)/spray Nasal Spray 1 Spray(s) Both Nostrils two times a day  influenza   Vaccine 0.5 milliLiter(s) IntraMuscular once  ofloxacin 0.3% Solution 1 Drop(s) Right EYE four times a day  pantoprazole   Suspension 40 milliGRAM(s) Oral daily  petrolatum Ophthalmic Ointment 1 Application(s) Both EYES two times a day  propranolol 5 milliGRAM(s) Oral every 12 hours  sodium chloride 0.65% Nasal 1 Spray(s) Both Nostrils two times a day    IVF:    CULTURES:    RADIOLOGY & ADDITIONAL TESTS:      ASSESSMENT:  44M PMH ?stroke/MI present to [facility] after collapsing at work. Decorticate posturing, vomiting, intubated for airway protection. 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 Transferred to [facility] for further management. s/p trach 10/14. s/p peg 10/21. Re-upgrade to ICU 2/2 desaturation event and suctioning requirements 11/15. Re-upgrade to NSICU 12/15 2/2 desaturation and tachycardia.    Neuro:  - neuro/vitals q4h  - pain control: tylenol prn  - seizure tx: keppra 1500mg BID, phenytoin 150/150/200  - vEEG replaced , s/p vEEG (10/3-) negative, (10/17-10/19) negative, (-) negative, (-), ( -),  +focal motor seizures not correlating w/ EEG,  - CTH : enlarged pontine hemorrhage, CTH 10/3: stable, CTH 10/25: mostly resolved pontine hemorrhage, CTH 12/15: R mastoid air cell opacification; acute otitis media vs sterile effusion, CTH : stable. CTH  stable, CTH  stable  - MRI brain 10/12: parenchymal hemorrhage, acute/subacute R cerebellar stroke      CV:  - -160  - tachycardia: propranolol 5mg BID   - HTN: amlodipine 5mg  - echo () EF 75%, repeat : 57%    Resp:  - trach to vent, AC/VC 40/400/14/6  - Secretions: duonebs/mucomyst/chest PT q6h     GI:  - TFs via PEG  - bowel regimen held for loose stool, last BM   - baclofen 5q12 for hiccups    Renal:  - FW 200cc q4h for hydration  - Urinary retenion: Gabriel removed , Voiding  - CKD: trend BUN/Cr  - renal US 10/1, 10/8, 1/15: Increased bilateral renal echogenicity consistent with medical renal disease    Endo:  - A1c 5.4    Heme:  - DVT ppx: SCDs, SQH 5000u q8h   - LE dopplers negative     ID:  - last pan cx , +UTI, ?left infiltrate  - vanc, zosyn (- ), MRSA swab pending, pend vanc trough after 4th dose   - empiric PNA: cefepime (-), s/p vanc (-), s/p zosyn (12/15-), s/p vanc (12/15-), s/p zosyn ( -)  - s/p Stenotrophomonas maltophilia PNA: s/p Bactrim (-) s/p Cefepime (-)  - 11/3, (+) sputum for stenotrophomas maltophlia, blood cx (+) klebsiella, cefepime 2gq12 ( - )   - empiric tx: s/p zosyn (11/3-) , s/p vanc  (11/3-)  - S/p Ancef (10/4-10/14) for PNA, and s/p Unasyn (10/18-10/23) +actinobacter baumanii     MISC:  - ophtho consult for keratitis  - erythromycin ointment R eye q4hrs, ofloxacin ointment R eye QID, artificial tears R eye q2hrs, moisture chamber at bedtime    Dispo: SDU status, DNR, pending PRUCOL for benefits     D/w Dr. D'Amico

## 2025-01-28 NOTE — PROGRESS NOTE ADULT - SUBJECTIVE AND OBJECTIVE BOX
Febrile overnight.  Chart reviewed. .    Remaining ROS negative       PHYSICAL EXAM:    General: NAD, on vent  HEENT: trach collar   Lungs: clear to auscultation anteriorly  Heart: rrr, normal s1s2  Abdomen: soft, no distension, + BS  Extremities:  warm, trace bilateral edema      VITAL SIGNS:  Vital Signs Last 24 Hrs  T(C): 36.6 (28 Jan 2025 09:08), Max: 38.3 (27 Jan 2025 22:20)  T(F): 97.8 (28 Jan 2025 09:08), Max: 101 (27 Jan 2025 22:20)  HR: 76 (28 Jan 2025 12:23) (70 - 96)  BP: 126/84 (28 Jan 2025 12:05) (102/66 - 145/97)  BP(mean): 100 (28 Jan 2025 12:05) (78 - 122)  RR: 14 (28 Jan 2025 12:23) (14 - 17)  SpO2: 100% (28 Jan 2025 12:23) (95% - 100%)    Parameters below as of 28 Jan 2025 12:23  Patient On (Oxygen Delivery Method): ventilator    O2 Concentration (%): 40      MEDICATIONS:  MEDICATIONS  (STANDING):  acetylcysteine 10%  Inhalation 4 milliLiter(s) Inhalation every 6 hours  albuterol/ipratropium for Nebulization 3 milliLiter(s) Nebulizer every 6 hours  amLODIPine   Tablet 5 milliGRAM(s) Oral daily  artificial tears (preservative free) Ophthalmic Solution 1 Drop(s) Right EYE every 4 hours  baclofen 5 milliGRAM(s) Oral every 12 hours  chlorhexidine 0.12% Liquid 15 milliLiter(s) Oral Mucosa every 12 hours  chlorhexidine 2% Cloths 1 Application(s) Topical <User Schedule>  doxazosin 8 milliGRAM(s) Oral at bedtime  erythromycin   Ointment 1 Application(s) Right EYE at bedtime  fluticasone propionate 50 MICROgram(s)/spray Nasal Spray 1 Spray(s) Both Nostrils two times a day  heparin   Injectable 5000 Unit(s) SubCutaneous every 8 hours  influenza   Vaccine 0.5 milliLiter(s) IntraMuscular once  levETIRAcetam 1500 milliGRAM(s) Oral two times a day  ofloxacin 0.3% Solution 1 Drop(s) Right EYE four times a day  pantoprazole   Suspension 40 milliGRAM(s) Oral daily  petrolatum Ophthalmic Ointment 1 Application(s) Both EYES two times a day  phenytoin   Suspension 150 milliGRAM(s) Oral <User Schedule>  phenytoin   Suspension 200 milliGRAM(s) Oral <User Schedule>  piperacillin/tazobactam IVPB.. 4.5 Gram(s) IV Intermittent every 8 hours  propranolol 5 milliGRAM(s) Oral every 12 hours  sodium chloride 0.65% Nasal 1 Spray(s) Both Nostrils two times a day    MEDICATIONS  (PRN):  acetaminophen   Oral Liquid .. 650 milliGRAM(s) Oral every 6 hours PRN Temp greater or equal to 38C (100.4F), Mild Pain (1 - 3)      ALLERGIES:  Allergies    Allergy Status Unknown    Intolerances        LABS:                        10.0   6.51  )-----------( 191      ( 28 Jan 2025 05:30 )             32.3     01-28    138  |  101  |  38[H]  ----------------------------<  116[H]  3.6   |  26  |  1.12    Ca    9.0      28 Jan 2025 05:30  Phos  3.4     01-28  Mg     2.0     01-28    TPro  6.9  /  Alb  3.2[L]  /  TBili  0.2  /  DBili  x   /  AST  12  /  ALT  24  /  AlkPhos  193[H]  01-28      Urinalysis Basic - ( 28 Jan 2025 05:30 )    Color: x / Appearance: x / SG: x / pH: x  Gluc: 116 mg/dL / Ketone: x  / Bili: x / Urobili: x   Blood: x / Protein: x / Nitrite: x   Leuk Esterase: x / RBC: x / WBC x   Sq Epi: x / Non Sq Epi: x / Bacteria: x      CAPILLARY BLOOD GLUCOSE      POCT Blood Glucose.: 111 mg/dL (26 Jan 2025 17:17)      RADIOLOGY & ADDITIONAL TESTS: Reviewed.

## 2025-01-28 NOTE — PROGRESS NOTE ADULT - ASSESSMENT
44M with unclear PMH (?stroke, MI) who was found down at work, intubated for airway protection and found to have acute parenchymal hemorrhage within anbil with mass effect (+ acute/subacute right cerebellar infarct) in setting of hypertensive emergency, transferred to [facility] for further neurosurgical care. Hospital course c/b poor neurologic recovery s/p trach-PEG, AUR s/p gabriel, ANIKA on CKD c/b hyperkalemia, HAP s/p amp-sulbactam (EOT 10/23), K aerogenes bacteremia  treated with 2 weeks of Cefepime per ID , On 11/15 he became septic and was transferred to NSICU due to increased o2 requirements and needed Vent support , Trach Cultures + for Stenotrophomonas which was treated with 7 days of Bactrim per ID , has been weaned of Vent since 11/23 and is now on 10lts at 40% o2 through Trach Collar. Stepped up to the NSICU on 12/15 for hypoxia and tachycardia. PE ruled out. On abx for 5 days. Unclear etiology. Now stepped down to 8Lach.    # Pontine hemorrhage  # Encephalopathy due to Intracranial Bleed   - Acute parenchymal hemorrhage within nabil with mass effect (+ acute/subacute right cerebellar infarct) in setting of hypertensive emergency.   - MRI brain also demonstrated acute/subacute R cerebellar stroke.   - No Neurosurgical Interventions were performed   - Secondary to IPH and cerebellar stroke patient has become Trach and PEG Dependent    #Febrile episode  - follow up cultures  - MRSA nares negative  - started on pseudomonal dosing of zosyn - if continues to be febrile, would reconsult ID    # Seizures  - Continue Seizure precautions   - Continue Keppra and phenytoin  - appreciate input from epilepsy    # Hypertension  - Continue amlodipine 5mg daily with holding parameters, uptitrate regimen as needed    # Chronic respiratory failure.   - s/p percutaneous trach by pulm on 10/14/24  - Currently on Vent Support   - Continue Chest PT    # Neurogenic dysphagia.   - Pt s/p PEG with surgery 10/21/24  - TF per nutrition  - aspiration precautions and elevation of HOB.    #Acute urinary retention.   - doxazosin 8mg  - continue to monitor urine outpatient     # Functional quadriplegia in setting of brainstem hemorrhage  - decub precautions  - care per nursing protocol     50 minutes spent on this encounter, including face to face with patient, review of chart, care coordination and documentation.   plan discussed with neurosurgery team.

## 2025-01-29 LAB
ANION GAP SERPL CALC-SCNC: 12 MMOL/L — SIGNIFICANT CHANGE UP (ref 5–17)
BUN SERPL-MCNC: 37 MG/DL — HIGH (ref 7–23)
CALCIUM SERPL-MCNC: 9.1 MG/DL — SIGNIFICANT CHANGE UP (ref 8.4–10.5)
CHLORIDE SERPL-SCNC: 100 MMOL/L — SIGNIFICANT CHANGE UP (ref 96–108)
CO2 SERPL-SCNC: 25 MMOL/L — SIGNIFICANT CHANGE UP (ref 22–31)
CREAT SERPL-MCNC: 1.18 MG/DL — SIGNIFICANT CHANGE UP (ref 0.5–1.3)
EGFR: 77 ML/MIN/1.73M2 — SIGNIFICANT CHANGE UP
EGFR: 77 ML/MIN/1.73M2 — SIGNIFICANT CHANGE UP
GLUCOSE SERPL-MCNC: 107 MG/DL — HIGH (ref 70–99)
HCT VFR BLD CALC: 29.3 % — LOW (ref 39–50)
HGB BLD-MCNC: 9.6 G/DL — LOW (ref 13–17)
MAGNESIUM SERPL-MCNC: 2 MG/DL — SIGNIFICANT CHANGE UP (ref 1.6–2.6)
MCHC RBC-ENTMCNC: 31.4 PG — SIGNIFICANT CHANGE UP (ref 27–34)
MCHC RBC-ENTMCNC: 32.8 G/DL — SIGNIFICANT CHANGE UP (ref 32–36)
MCV RBC AUTO: 95.8 FL — SIGNIFICANT CHANGE UP (ref 80–100)
NRBC # BLD: 0 /100 WBCS — SIGNIFICANT CHANGE UP (ref 0–0)
NRBC BLD-RTO: 0 /100 WBCS — SIGNIFICANT CHANGE UP (ref 0–0)
PHENYTOIN FREE SERPL-MCNC: 3.7 MG/L — HIGH (ref 1–2)
PHOSPHATE SERPL-MCNC: 3.9 MG/DL — SIGNIFICANT CHANGE UP (ref 2.5–4.5)
PLATELET # BLD AUTO: 208 K/UL — SIGNIFICANT CHANGE UP (ref 150–400)
POTASSIUM SERPL-MCNC: 3.8 MMOL/L — SIGNIFICANT CHANGE UP (ref 3.5–5.3)
POTASSIUM SERPL-SCNC: 3.8 MMOL/L — SIGNIFICANT CHANGE UP (ref 3.5–5.3)
RBC # BLD: 3.06 M/UL — LOW (ref 4.2–5.8)
RBC # FLD: 13.5 % — SIGNIFICANT CHANGE UP (ref 10.3–14.5)
SODIUM SERPL-SCNC: 137 MMOL/L — SIGNIFICANT CHANGE UP (ref 135–145)
WBC # BLD: 5.58 K/UL — SIGNIFICANT CHANGE UP (ref 3.8–10.5)
WBC # FLD AUTO: 5.58 K/UL — SIGNIFICANT CHANGE UP (ref 3.8–10.5)

## 2025-01-29 PROCEDURE — 99231 SBSQ HOSP IP/OBS SF/LOW 25: CPT

## 2025-01-29 PROCEDURE — 99233 SBSQ HOSP IP/OBS HIGH 50: CPT

## 2025-01-29 RX ORDER — PIPERACILLIN-TAZO-DEXTROSE,ISO 3.375G/5
4.5 IV SOLUTION, PIGGYBACK PREMIX FROZEN(ML) INTRAVENOUS EVERY 8 HOURS
Refills: 0 | Status: DISCONTINUED | OUTPATIENT
Start: 2025-01-29 | End: 2025-01-30

## 2025-01-29 RX ADMIN — Medication 25 GRAM(S): at 14:13

## 2025-01-29 RX ADMIN — Medication 1 DROP(S): at 23:19

## 2025-01-29 RX ADMIN — OFLOXACIN 1 DROP(S): 3 SOLUTION OPHTHALMIC at 05:11

## 2025-01-29 RX ADMIN — ACETYLCYSTEINE 4 MILLILITER(S): 200 INHALANT RESPIRATORY (INHALATION) at 13:38

## 2025-01-29 RX ADMIN — FLUTICASONE PROPIONATE 1 SPRAY(S): 50 SPRAY, METERED NASAL at 05:11

## 2025-01-29 RX ADMIN — Medication 1 APPLICATION(S): at 05:11

## 2025-01-29 RX ADMIN — Medication 150 MILLIGRAM(S): at 13:39

## 2025-01-29 RX ADMIN — IPRATROPIUM BROMIDE AND ALBUTEROL SULFATE 3 MILLILITER(S): .5; 2.5 SOLUTION RESPIRATORY (INHALATION) at 05:10

## 2025-01-29 RX ADMIN — ERYTHROMYCIN 1 APPLICATION(S): 5 OINTMENT OPHTHALMIC at 23:30

## 2025-01-29 RX ADMIN — IPRATROPIUM BROMIDE AND ALBUTEROL SULFATE 3 MILLILITER(S): .5; 2.5 SOLUTION RESPIRATORY (INHALATION) at 23:29

## 2025-01-29 RX ADMIN — Medication 200 MILLIGRAM(S): at 19:53

## 2025-01-29 RX ADMIN — Medication 150 MILLIGRAM(S): at 04:47

## 2025-01-29 RX ADMIN — ACETYLCYSTEINE 4 MILLILITER(S): 200 INHALANT RESPIRATORY (INHALATION) at 18:04

## 2025-01-29 RX ADMIN — Medication 1 DROP(S): at 13:35

## 2025-01-29 RX ADMIN — BACLOFEN 5 MILLIGRAM(S): 10 INJECTION INTRATHECAL at 18:08

## 2025-01-29 RX ADMIN — BACLOFEN 5 MILLIGRAM(S): 10 INJECTION INTRATHECAL at 05:10

## 2025-01-29 RX ADMIN — Medication 1 APPLICATION(S): at 18:05

## 2025-01-29 RX ADMIN — OFLOXACIN 1 DROP(S): 3 SOLUTION OPHTHALMIC at 18:04

## 2025-01-29 RX ADMIN — AMLODIPINE BESYLATE 5 MILLIGRAM(S): 10 TABLET ORAL at 05:09

## 2025-01-29 RX ADMIN — Medication 15 MILLILITER(S): at 05:09

## 2025-01-29 RX ADMIN — HEPARIN SODIUM 5000 UNIT(S): 1000 INJECTION INTRAVENOUS; SUBCUTANEOUS at 05:10

## 2025-01-29 RX ADMIN — ACETYLCYSTEINE 4 MILLILITER(S): 200 INHALANT RESPIRATORY (INHALATION) at 05:09

## 2025-01-29 RX ADMIN — DOXAZOSIN MESYLATE 8 MILLIGRAM(S): 8 TABLET ORAL at 23:29

## 2025-01-29 RX ADMIN — Medication 25 GRAM(S): at 05:14

## 2025-01-29 RX ADMIN — Medication 1 SPRAY(S): at 18:06

## 2025-01-29 RX ADMIN — HEPARIN SODIUM 5000 UNIT(S): 1000 INJECTION INTRAVENOUS; SUBCUTANEOUS at 23:29

## 2025-01-29 RX ADMIN — Medication 25 GRAM(S): at 23:29

## 2025-01-29 RX ADMIN — Medication 40 MILLIGRAM(S): at 13:37

## 2025-01-29 RX ADMIN — FLUTICASONE PROPIONATE 1 SPRAY(S): 50 SPRAY, METERED NASAL at 18:07

## 2025-01-29 RX ADMIN — IPRATROPIUM BROMIDE AND ALBUTEROL SULFATE 3 MILLILITER(S): .5; 2.5 SOLUTION RESPIRATORY (INHALATION) at 13:37

## 2025-01-29 RX ADMIN — Medication 1 APPLICATION(S): at 07:35

## 2025-01-29 RX ADMIN — Medication 1 DROP(S): at 08:24

## 2025-01-29 RX ADMIN — Medication 1 SPRAY(S): at 05:10

## 2025-01-29 RX ADMIN — Medication 1 DROP(S): at 18:04

## 2025-01-29 RX ADMIN — Medication 15 MILLILITER(S): at 18:06

## 2025-01-29 RX ADMIN — Medication 1 DROP(S): at 05:11

## 2025-01-29 RX ADMIN — HEPARIN SODIUM 5000 UNIT(S): 1000 INJECTION INTRAVENOUS; SUBCUTANEOUS at 13:35

## 2025-01-29 RX ADMIN — LEVETIRACETAM 1500 MILLIGRAM(S): 10 INJECTION, SOLUTION INTRAVENOUS at 05:10

## 2025-01-29 RX ADMIN — IPRATROPIUM BROMIDE AND ALBUTEROL SULFATE 3 MILLILITER(S): .5; 2.5 SOLUTION RESPIRATORY (INHALATION) at 18:04

## 2025-01-29 RX ADMIN — Medication 1 DROP(S): at 01:01

## 2025-01-29 RX ADMIN — OFLOXACIN 1 DROP(S): 3 SOLUTION OPHTHALMIC at 13:38

## 2025-01-29 RX ADMIN — Medication 20 MILLIEQUIVALENT(S): at 07:52

## 2025-01-29 RX ADMIN — LEVETIRACETAM 1500 MILLIGRAM(S): 10 INJECTION, SOLUTION INTRAVENOUS at 18:07

## 2025-01-29 RX ADMIN — ACETYLCYSTEINE 4 MILLILITER(S): 200 INHALANT RESPIRATORY (INHALATION) at 23:29

## 2025-01-29 NOTE — CHART NOTE - NSCHARTNOTEFT_GEN_A_CORE
Admitting Diagnosis:   Patient is a 44y old  Male who presents with a chief complaint of brain stem bleed (29 Jan 2025 09:54)  PAST MEDICAL & SURGICAL HISTORY:  Acute myocardial infarction  CVA (cerebral vascular accident)      Current Nutrition Order:   Diet, NPO with Tube Feed:   Tube Feeding Modality: Gastrostomy  [employer] Renal Support 1.8  Total Volume for 24 Hours (mL): 1080  Total Number of Cans: 5  Continuous  Starting Tube Feed Rate {mL per Hour}: 30  Increase Tube Feed Rate by (mL): 10     Every 4 hours  Until Goal Tube Feed Rate (mL per Hour): 60  Tube Feed Duration (in Hours): 18  Tube Feed Start Time: 10:00  Tube Feed Stop Time: 04:00  Banatrol TF     Qty per Day:  2 (12-18-24 @ 09:33) [Active]     PO Intake: N/A... remains NPO with tube feeds as primary source of nutrition (tube feeds running at goal rate)    GI Issues: no noted GI upset or tube feeding tolerance issues; last BM not documented, no bowel regimen noted, RD to follow up with nursing/medical team    Pain: no pain/discomfort noted    Skin Integrity: no pressure ulcers, noted with generalized, trace edema; PEG remains present; Mookie: 10      01-28-25 @ 07:01  -  01-29-25 @ 07:00  --------------------------------------------------------  IN: 2840 mL / OUT: 1225 mL / NET: 1615 mL        Labs:   01-29    137  |  100  |  37[H]  ----------------------------<  107[H]  3.8   |  25  |  1.18    Ca    9.1      29 Jan 2025 06:27  Phos  3.9     01-29  Mg     2.0     01-29    TPro  6.9  /  Alb  3.2[L]  /  TBili  0.2  /  DBili  x   /  AST  12  /  ALT  24  /  AlkPhos  193[H]  01-28    CAPILLARY BLOOD GLUCOSE    Medications:  MEDICATIONS  (STANDING):  acetylcysteine 10%  Inhalation 4 milliLiter(s) Inhalation every 6 hours  albuterol/ipratropium for Nebulization 3 milliLiter(s) Nebulizer every 6 hours  amLODIPine   Tablet 5 milliGRAM(s) Oral daily  artificial tears (preservative free) Ophthalmic Solution 1 Drop(s) Right EYE every 4 hours  baclofen 5 milliGRAM(s) Oral every 12 hours  chlorhexidine 0.12% Liquid 15 milliLiter(s) Oral Mucosa every 12 hours  chlorhexidine 2% Cloths 1 Application(s) Topical <User Schedule>  doxazosin 8 milliGRAM(s) Oral at bedtime  erythromycin   Ointment 1 Application(s) Right EYE at bedtime  fluticasone propionate 50 MICROgram(s)/spray Nasal Spray 1 Spray(s) Both Nostrils two times a day  heparin   Injectable 5000 Unit(s) SubCutaneous every 8 hours  influenza   Vaccine 0.5 milliLiter(s) IntraMuscular once  levETIRAcetam 1500 milliGRAM(s) Oral two times a day  ofloxacin 0.3% Solution 1 Drop(s) Right EYE four times a day  pantoprazole   Suspension 40 milliGRAM(s) Oral daily  petrolatum Ophthalmic Ointment 1 Application(s) Both EYES two times a day  phenytoin   Suspension 150 milliGRAM(s) Oral <User Schedule>  phenytoin   Suspension 200 milliGRAM(s) Oral <User Schedule>  piperacillin/tazobactam IVPB.. 4.5 Gram(s) IV Intermittent every 8 hours  propranolol 5 milliGRAM(s) Oral every 12 hours  sodium chloride 0.65% Nasal 1 Spray(s) Both Nostrils two times a day    MEDICATIONS  (PRN):  acetaminophen   Oral Liquid .. 650 milliGRAM(s) Oral every 6 hours PRN Temp greater or equal to 38C (100.4F), Mild Pain (1 - 3)    Dosing Anthropometrics:  Height for BMI (FEET)	5 Feet  Height for BMI (INCHES)	8 Inch(s)  Height for BMI (CM)	172.72 Centimeter(s)  Weight for BMI (lbs)	176.4 lb  Weight for BMI (kg)	80 kg  Body Mass Index	              26.8  ideal body weight:               154 pounds / 70 kg   % ideal body weight             114%     Weight Change: No new wt obtained since admit, strongly recommend nursing to obtain new wt to track/trend changes. RD obtained bedweight today (1/23/25) of ~79.5kg, consistent with admission weight. RD to follow up with nursing.     Estimated energy needs:  Weight used for calculations	ideal body weight  Estimated Energy Needs Weight (lbs)	154 lb  Estimated Energy Needs Weight (kg)	69.8 kg  Estimated Energy Needs From (christiana/kg)	25  Estimated Energy Needs To (christiana/kg)	30  Estimated Energy Needs Calculated From (christiana/kg)	1745  Estimated Energy Needs Calculated To (christiana/kg)	2094  Weight used for calculations	ideal body weight  Estimated Protein Needs Weight (lbs)	154 lb  Estimated Protein Needs Weight (kg)	69.8 kg  Estimated Protein Needs From (g/kg)	1.2  Estimated Protein Needs To (g/kg)	1.4  Estimated Protein Needs Calculated From (g/kg)	83.76  Estimated Protein Needs Calculated To (g/kg)	97.72  Estimated Fluid Needs Weight (lbs)	154 lb  Estimated Fluid Needs Weight (kg)	69.8 kg  Estimated Fluid Needs From (ml/kg)	25  Estimated Fluid Needs To (ml/kg)	30  Estimated Fluid Needs Calculated From (ml/kg)	1745  Estimated Fluid Needs Calculated To (ml/kg)	2094  Other Calculations	Pt is >100% ideal body weight, thus ideal body weight used for all calculations. Needs adjusted for age, clinical course.     Subjective: This is a 44 year old male, unknown past medical history (reported stroke and MI by coworkers) presented to [facility] with AMS, Pt was working at [employer] when he was found down by coworkers. EMS called and pt brought to [facility] ED. Intubated, sedated, started on cardene for SBPs in 200s. CT head showed brain stem bleed. Transferred to NSICU for further management.    Patient seen at bedside on [facility] for nutrition follow up. RD spoke to nursing on the unit, pt was resting in bed. Current diet order: NPO with tube feeds of [employer] Renal Support, at goal rate of 60 cc/hr x18 hrs (providing ~ 1944 kcal, 86g protein, 713 mL free water), meeting 100% estimated nutrient needs. No tube feeding tolerance issues noted. GI within normal limits at this time as per flowsheets, RD to follow up with nursing and medical team about pt's last BM and bowel movment regularity. Labs: BUN elevated (37), medical team is aware, RD to monitor trends. Medications reviewed as above. RD will continue to follow, please see nutrition recommendations below.     Previous Nutrition Diagnosis: Inadequate Oral Intake related to inability to meet nutritional demands via PO as evidenced by NPO with tube feedings    Active [ x ]  Resolved [   ]    Goal: Pt will consume >/= 75% of estimated nutrient needs via tolerated route     Nutrition Recommendations:  1. Continue with current tube feed regimen to meet 100% estimated nutrient needs:  **[employer] Renal Support 1.8: at goal of 60mL/hour x 18 hours, providing 1080mL total volume, 1944 calories, 86g protein, ~713mL free water; this meets ~24 calories/kg ideal body weight, ~1.1g protein/kg ideal body weight**  **Provide Banatrol Tube Feeding prn**  **Maintain aspiration precautions at all times; monitor for signs/symptoms of intolerance**  2. Monitor weights (weekly), GI function, skin integrity; electrolytes, BMP, glucose, CBC per MD discretion *renal indices*  3. Pain and bowel regimen per medical team  4. Align nutrition with goals of care at all times  5. Recommend nursing to obtain new weights to track/trend changes    Education: deferred at this time related to pt resting in bed, and cognitive status.     Risk Level: High [   ] Moderate [ x ] Low [   ].

## 2025-01-29 NOTE — PROGRESS NOTE ADULT - SUBJECTIVE AND OBJECTIVE BOX
PI:  45 yo M, unknown past medical history (reported stroke and MI by coworkers) presented to [facility] with AMS, Pt was working at [employer] when he was found down by coworkers. EMS called and pt brought to [facility] ED. Intubated, sedated, started on cardene for SBPs in 200s. CT head showed brain stem bleed. Transferred to NSICU for further management.  (30 Sep 2024 12:55)    S/Overnight events: GEORGE overnight, neuro stable.      Hospital Course:   : transferred from [facility]. A line placed. Versed dc'd. Cy Rader at bedside, states pt has family in [place], cannot confirm medications or PMH other than stroke and MI. 250cc bolus 3% given. LR switched to NS. hydralazine 25q8 started, 3% started, switched propofol to precedex   10/1: stability CTH done. Added labetalol, started TF. Palliative consulted. ethics consulted to determine surrogate. febrile 103, pan cx sent  10/2: BD 2, GEORGE overnight. TF resumed. Desatt'd to 80s, FiO2 inc. to 50. Fentanyl given, ABG, CXR ordered. Maxxed on precedex, started on propofol for DARIEN -4 - -5. Precedex dc'd. Duonebs, mucomyst, hypertonic added. 3% dc'd. Cardene dc'd. Start vanc/CTX. Increased labetalol 200q8. MRSA negative, dc'd vanc. ETT pulled back 2cm x 2, good positioning after confirmatory chest xray. Ethics attempting to establish HCP with family. Na 159, starting FW 250q6 for range 150-155.   10/3: BD3, GEORGE o/n, neuro stable. Na elevating, FW increased to 300q6. Dc'd bowel reg for diarrhea. vEEG started. SQH 5000q8 tonight.   10/4: BD 4, albumn bolus, incr. LR to 80 2/2 incr. in Cr, LR to 10 0cc/hr for uptrending Cr. Started 7% hypersal for 48hrs and SL atropine for inline/oral thick secretions. Dc'd CTX and started ancef for MSSA in the sputum. Nephrology consulted for CKD, f/u recs. SBP 170s, given hydralazine 10mg IVP.   10/5: BD5, o/n 10mg IVP hydralazine given for SBP 170s and started on hydralazine 25q8 via OGT. 10mg IV push labetalol for SBP > 160s. RT placed for diarrhea.   10/6: BD6, o/n FW increased to 350q4 per nephrology recs. IV tylenol for temp 100.6, SBp 160s presumed uncomfortable.   10/7: BD7, overnight pancultured for temp 101.8F.   10/8: BD8. GEORGE. Cr bumped. decreased LR to 75cc/hr. Adding simethicone ATC. incr hydralazine 50mgTID. Incr labetalol 300mgTID. Na 145, decreased FWF to 250q6. Start precedex. FENa consistent with intrinsic kidney injury. Pend repeat renal US. Retaining up to 1.3L, bladder scans q6, straight cath PRN  10/9: BD 9. GEORGE overnight. Neuro stable. abd xray for distention w non-specific gas pattern, OGT to LIWS for morning. duonebs/mucomyst to q8 for improving secretions. Changed tube feeds to Jevity 1.5 20cc/hr, low rate due to abdominal distention, nepro dense and more difficult to digest. Tolerating CPAP, confirmed by ABG.   10/10: BD 10. GEORGE overnight. Neuro stable. (+) gabriel for urinary retention on bladder scan. inc TF to goal rate of 40cc/hr. family leaning toward pursuing trach/PEG. 1/2 amp for FS 81.   10/11: BD 11. GEORGE overnight. Neuro stable. Trach/PEG consults placed.   10/12: BD 12. GEORGE overnight. Neuro stable. MRI brain complete.   10/13: BD 13. Increase flomax. Hold SQH after PM dose for trach tm. IVL.   10/14: BD 14. GEORGE overnight, remains on AC/VC. Gabriel placed for urinary retention. Dc'd free water.  S/p trach with pulm. NGT placed and CXR confirmed in good position.   10/15: BD 15, GEORGE ovn. resumed feeds. spiked 101, pan cx sent.   10/16: BD 16. GEORGE ovn. Lokelma 5mg for K+ 5.4. Started vanc q 24/zosyn for empiric PNA coverage, IVF to 100/hr. PEG held for fever.   10/17: BD 17,  ordered serum osm and urine osm for am. Started sinemet for neurostimulation. Increased cardura to 0.8. Started FW 100q4, dc'd IVF. MRSA negative, dc'd vanc. NGT replaced d/t coiling.   10/18: BD 18, GEORGE overnight, neuro stable. Amantadine added for neurostim. zosyn changed to unasyn for acinetobacter baumannii, failed TOV and required SC  10/19: BD 19, GEORGE ovn. cardura 2mg added for retention. labetalol decreased 200q8, hydralazine decreased 25q8. Gabriel replaced.   10/20: BD20, GEORGE overnight. NGT dislodged, replaced. PEG tomorrow w/ gen surg, FW increased to 150q4 and labetalol decreased to 100q8, lokelma given for hyperkalemia.   10/21: BD 21. POD0 PEG placement with Gen surg. decr labetolol to 50q8, incr. cardura to 0.4, started lokelma and phoslo, dc gabriel POD0 PEG placement with Gen surg.  10/22: BD 22. Plan to start TF today via PEG. dc labetalol, Following ophtho recs. Increased apnea settings - found to be in cheyne-moe respiration. CPAP 5/5.  10/23: BD 23. hydralazine d/c'd, trach collar trial today. Rectal tube placed at 6am.  10/24: BD24, o/n lokelma held due to diarrhea. Free water 100q6 resumed. dc'd tamsulosin, amantadine. Incr'd cardura to 8mg qhs. Dc'd FW. Switched jevity to nepro. gabrile placed for high urine output. Started SL atropine for oral secretions. Dc'd free water.  10/25: BD25, o/n decreased suctioning requirements to > q4hrs, GEORGE. Cr improving, cont phoslo, lokelma held at this time. Gabriel placed yest, cont. Tolerating trach collar. Given 500cc plasmalyte bolus for ANIKA. Dc'd sinemet.   10/26: BD26, o/n resumed lokelma 5mg daily and resumed 100cc free water q6hrs. Change in neuro status with new right pupillary dilation with anisocoria (right pupil 6mm fixed and left pupil 3mm briskly reactive). Given 23.4% NaCl bullet, taken for emergent CTH showing mostly resolved pontine hemorrhage, continued brainstem hypodensity likely edema d/t hemorrhage, no new hemorrhage or infarct, no herniation, mild increase in size of left lateral ventricle. Vitals remaine stable. Na goal > 140.   10/27: BD27, o/n GEORGE.Neuro stable. Pend stepdown with airway bed.   10/28: BD 28. GEORGE overnight. Neuro stable. Miralax ordered. Gabriel removed, pending TOV.  10/29: BD 29. GEORGE o/n. Given 2L NS over 8 hrs for increased BUN/Cr ratio. Gabriel placed for frequent straight cath.   10/30: BD 30.   10/31: BD 31. GEORGE overnight. Na 149, increased free water to 200q6. 1L NS for uptrending BUN.   : BD 32. GEORGE overnight. Given 1L NS for dehydration. Na 146, increased FW to 250q6.   : BD 33 GEORGE overnight, neuro stable, given 500cc bolus for net negative status and tachycardia   11/3: BD 34, GEORGE overnight, neuro stable. Patient remains tachycardic, EKG showing sinus tachycardia, given additional 500cc NS bolus. Febrile to 101.9F, pan cultured (without UA), CXR WNL, given tylenol.   : BD 35, GEORGE overnight, neuro stable. Given 1L NS for tachycardia. sputum (+) for stenotropohomas maltophilia.   : BD 36 GEORGE overnight, neuro stable. Vancomycin dc'd. Chest PT BID. ID consulted, cont zosyn.  : BD 37. blood cx + klebsiella dc zosyn changed to cefepime, CTAP ordered, rpt blood cx sent.    : BD 38. Pending CT A/P, given 250cc bolus and starting maintenance fluids overnight. Pending CT A/P after bolus   : BD 39. CT CAP negative for infection.   : BD 40. GEORGE overnight.  11/10: BD 41. GEORGE overnight. desat to 85 on trach collar, O2 inc to 10L and 100%, O2 sat inc to 95. pt tachy to 110s, euvolemic. given tylenol. ABG and CXR ordered. spiked fever, pancultured, RVP negative. AM ABG w pO2 79, rpt w pO2 79. pt appears comfortable, satting 94%.   : BD 42. GEORGE overnight. pt became tachy to 130s, desat to 90 on 100% FiO2 and 10L. suctioned, (+) productive cough. temp 101.4, given 1g IV tylenol and 500cc NS bolus for euvolemia. fever and HR downtrending. LE dopplers negative for dvt  : BD 43, GEORGE ovn, fever and HR downtrending, satting 97% 70% FIO2  : BD 44, GEORGE ovn. started standing tylenol x24 hours for tachycardia. desat to 80s, o2 increased. CXR stable, pending CTA PE protocol.   : BD 45, GEORGE overnight, neuro stable. resp therapy dec FiO2 to 70%.   11/15: BD 46, GEORGE overnight, neuro stable.  Rapid called for desaturation 30s, tachycardic 140s. Patient bagged, 100% fio2, heavily suctioned. CXR/POCUS unremarkable. ABG c/w desaturation. WBC 14.71. Afebrile. O2 improved to 90s and patient upgraded to ICU. ABG paO2 30s improved to 89 on vent. IV Tylenol x 1, sputum sent. Start protonix while o-n vent.   : BD 47. POCUS showed collapsable IVCF, given 1L bolus. Vanco/Cefpime added empriic for PNA, NGT feeds restarted. MRSA swab neg, Vanc DC'd.   : BD 48. GEORGE overnight. 1l bolus for tachycardia. Spiked to 101, cultured. 500cc bolus for tachycardia, tachy to 148 given 25mcg fentanyl, 250cc albumin, 1.5L bolus. 5 IV lopressor with response HR to 100s. +Stenotrophomonas on sputum cx.   : BD 49. GEORGE overnight. Consulted ID, cefepime switched to bactrim x7days. Started hydrochlorothiazine 12.5mg daily.  : BD 50. Tachy 120s, given tylenol and 500cc NS. Tolerating 5/5, switched to TCx. Holding phos binder. D/c Bactrim. D/c gabriel, f/u TOV. Dc'd PPI.   : BD 51. GEORGE ovn. 1600 satting low 90s, mildly tachy to 110s, afebrile, RR wnl. O2 improved to mid 90s while inc O2 to 100% on TCx. CPAP 5/5 placed back on.  : BD 52, GEORGE ovn, tolerating CPAP 5/5. Switch to trach collar during the day if tolerating well. HCTZ held for Cr bump, straight cath frequence increased to q4  : BD 53, GEORGE ovn. Resumed phoslo. Gabriel placed. Resumed HCTZ.   : BD 54. Holding tylenol in setting of possible fever, will require pan cx if febrile. Cr improved today. Cont CPAP. Bowel regimen held i/s/o diarrhea. FOBT negative.  : BD 55. GEORGE overnight. Neuro stable. HCTZ dc'ed, started lisinopril 5. Lokelma dc'ed for K 3.7.   : BD 56, GEORGE overnight. Neuro stable. dc'd lisinopril 5mg. Gabriel dc'd. TOV. 1545 noted to be hypotensive, MAP 50, in supine position on chair, HR 60s, afebrile, O2 96%. Given 1L cc NS bolus, placed back on bed in reverse trendelenberg, improved to map of 66. Neostick at bedside. Vitals check q1h. Dc'ed amlodipine. Failed TOV, bladder scan q6, sc prn. Added back Senna.   : BD 57, GEORGE overnight. Neuro stable. Dc'd phoslo.   : BD 58, GEORGE overnight. Neuro stable.    : BD 59. Gabriel replaced.   : GEORGE.  : GEORGE, neuro stable.   : BD 62, GEORGE overnight  : BD 63, GEORGE overnight.; Given 1L bolus of LR for uptrending BUN/Cr.  12/3: BD 64. Reinstated eye gtt/moisture chamber given increased Rt eye injection  : BD 65. GEORGE overnight. Attempted to speak with ophtho regarding eyelid weight/closure but no answer, full mailbox.   : BD 66, GEORGE overnight, bowel regimen increased and had BM.   : BD 67, GEORGE overnight, neuro stable.  : GEORGE overnight, neuro stable. /110s, given x1 hydralazine 10 mg IVP. Restarted home amlodipine 5mg.  : GEORGE. OOB to chair.     : GEORGE, mucomyst added for thick secretions, simethicone for abd distension, abd xray with stool burden, increased bowel regimen.   : GEORGE overnight.   : GEORGE overnight.   : GEORGE overnight  12/15: o/n Patient became tachycardic HR 120s and 10 minutes later O2 sat dropped as low as 89%. Patient suctioned without improvement in O2 sat and tube feeds found in suction catheter. TFs held.  STAT CXR ordered. STAT labs sent. Respiratory therapist called to bedside and patient trach connected to ventilator. After connection to ventilator and further suctioning O2 sat improved to 97% but patient HR remains 120-130s. Upgraded to NSICU for further management. Vancomycin and zosyn started. CTA  chest PE protocol and CTH ordered. Blood cultures sent.given 500cc bolus, rpt ABG sent pO2 243, CTH and CTA chest done. FS while NPO, FiO2 dec 50 pending ABG. sputum cx positive for few GPC and GNR.   : GEORGE overnight, restarted amlodipine, troponin 75   : GEORGE overnight, neuro stable. Attempted CPAP this morning, did not tolerate and back on full vent support. Dc'd Vanc. Tachycardia to 140s, noted extremities to be twitching along with jaw twitching. Given 2g of Keppra, total 4mg ativan and placed on EEG, full set of labs and lactate negative. Resumed trickle feeds at 20cc/hr. Given 250cc albumin for tachycardia.   : GEORGE overnight. Neuro stable. CTH stable. EEG negative and dc'd.   : GEORGE overnight. neuro stable. SIMV most of day, AC/VC at night.   : GEORGE overnight, neuro stable. remains on VC/AC  : GEORGE ovn. 1L bolus for tachy to 120s-130s.   : GEORGE ovn. Tachy to 120s, given tylenol and IVF. 2mg IV ativan given for L jaw twitching. Sx resolved for 3 minutes and started again. Epilepsy contacted. Given 2mg IV ativan. Continued twitching. Increased keppra to 1500mg BID, 2mg ativan given. Propranolol started for refractory tachycardia. vEEG ordered. albumin given. POCUS performed, no b lines. Started on fosphenytoin, loaded w 20mg/kg then 100mg TID. febrile, pancx, started cefepime 2g q8, vancomycin  : GEORGE ovn. +focal motor seizures on eeg. ID consulted. Vancomycin dc'ed as per ID.  : GEORGE ovn, neuro stable. Baclofen 5 mg q12 started for hiccups. Na 129 from 134. Urine lytes c/w SIADH. Given 250cc 3% bolus. CT chest for infection w/u - f/u read. Repeat Na 136. UA negative  : GEORGE ovn.   : GEORGE ovn  : GEORGE overnight. Blood cx neg @ 4 days, DC cefepime per medicine (sputum colonized).   : Desaturation o/n to 80's, CXR obtained, pulse ox changed and sats resolved. Na 133 from 138, 250cc 3% bolus given. Cefepime resumed until  per ID. Rpt Na 138.  : GEORGE overnight. Na stable.   : GEORGE overnight. Family meeting with son, pt now DNR.   : GEORGE overnight.   : GEORGE overnight, neuro stable.  : GEORGE overnight, neuro stable. FW decreased to 100q6.   1/3: GEORGE overnight, neuro stable. fosphenytoin IV changed to pheytoin PO via PEG per epilepsy recommendations  : GEORGE overnight, neuro stable. FW incr. to 100q4  : GEORGE overnight, neuro stable.   : GEORGE overnight, neuro stable   : GEORGE overnight, neuro stable. BUN/Cr increased, increased FW to 200q6. Given 1L bolus of LR over 2 hours. Gabriel d/c'd, voiding, continue bladder scan q6.   : GEORGE overnight, neuro stable. BUN/Cr improving.  : GEORGE overnight, neuro stable.   1/10: GEORGE overnight, neuro stable.   : GEORGE overnight, neuro stable, vent settings stable.   : GEORGE overnight, neuro stable. Febrile to 102F, f/u pan cx, chest xray, given tylenol. Zosyn started.   : GEORGE overnight, neuro stable, cont Zosyn for presumed PNA, prelim sputum cx growing; few GS neg diplococci, mod GS neg rods, rare GS + rods, fever trend improved. Free water increased to 164qkh6.   : GEORGE overnight. pending renal US for elevated Cr  1/15: GEORGE ovn. renal US complete.   : GEORGE overnight, neuro stable   : GEORGE overnight, neuro stable   : GEORGE overnight, neuro stable.   : GEORGE overnight, neuro stable. Propranolol dec to 5q8.   : GEORGE overnight, neuro stable. Weaned propranolol to 5mg BID.   : GEORGE ovn, in AM having rapid vertical eye movements with facial twitching, 2g total keppra given for AM dose and phenytoin level ordered, vital signs stable. CTH stable. Phenytoin increased to 100/100/150mg per epilepsy  : GEORGE ovn. IV hydral x 1 for SBP 170s.   : Cont to have focal motor seizures. Per epilepsy, changed phenytoin dose to 100/100/200.   : Phenytoin lvl tmrw AM. Chest Xray completed due to temp 100.2F  : GEORGE overnight. Incr dilantin morning and afternoon per epilepsy.   : GEORGE overnight. Sustained focal twitching noticed by nursing. Ativan 8mg in total given. Fosphenytoin 1500mg IV given. Placed on EEG. Epilepsy following. TFs held. Phenytoin increased to 150/150/200.   : o/n CTH completed due to continued lethargy after ativan given yesterday afternoon. TFs resumed. 500cc bolus NS given for SBP 90s. EEG dc'ed, discussed w/ Dr. Tomlin. Febrile 101F, pan cx sent. Likely left sided infiltrate on CXR, c/f aspiration PNA. Started on vanc/zosyn. MRSA swab pending.   : Neuro stable, on vanc/zosyn. +UTI on UA, MRSA negative. Vanc dc'd. RVP negative. Zosyn increased to pseudomonal dosing.   : GEORGE overnight, neuro stable.      Vital Signs Last 24 Hrs  T(C): 36.8 (2025 21:34), Max: 36.8 (2025 04:45)  T(F): 98.3 (2025 21:34), Max: 98.3 (2025 21:34)  HR: 76 (2025 21:02) (70 - 94)  BP: 122/82 (2025 20:23) (102/66 - 145/97)  BP(mean): 98 (2025 20:23) (78 - 122)  RR: 14 (2025 21:02) (14 - 17)  SpO2: 99% (2025 21:02) (98% - 100%)    Parameters below as of 2025 21:02  Patient On (Oxygen Delivery Method): ventilator    O2 Concentration (%): 40    I&O's Detail    2025 07:01  -  2025 07:00  --------------------------------------------------------  IN:    Enteral Tube Flush: 600 mL    Miscellaneous Tube Feedin mL  Total IN: 1680 mL    OUT:    Free Water: 0 mL    Intermittent Catheterization - Urethral (mL): 400 mL    Oral Fluid: 0 mL    Voided (mL): 1095 mL  Total OUT: 1495 mL    Total NET: 185 mL      2025 07:01  -  2025 00:04  --------------------------------------------------------  IN:    Enteral Tube Flush: 560 mL    Free Water: 400 mL    IV PiggyBack: 100 mL    Miscellaneous Tube Feedin mL  Total IN: 1660 mL    OUT:    Oral Fluid: 0 mL    Voided (mL): 625 mL  Total OUT: 625 mL    Total NET: 1035 mL        I&O's Summary    2025 07:01  -  2025 07:00  --------------------------------------------------------  IN: 1680 mL / OUT: 1495 mL / NET: 185 mL    2025 07:01  -  2025 00:04  --------------------------------------------------------  IN: 1660 mL / OUT: 625 mL / NET: 1035 mL        PHYSICAL EXAM:  General: patient seen laying supine in bed in NAD  Neuro: opens eyes to voice, does not follow commands, RUE trace squeeze, b/l lower extremities withdraw,  LUE 0/5, mild mouth and R eye twitching  HEENT: PERRL, +trach to vent  Neck: supple  Cardiac: RRR, S1S2  Pulmonary: chest rise symmetric  Abdomen: soft, nontender, nondistended, +PEG  Ext: perfusing well  Skin: warm, dry    TUBES/LINES:  [] CVC  [] A-line  [] Lumbar Drain  [] Ventriculostomy  [] Other    DIET:  [] NPO  [] Mechanical  [x] Tube feeds    LABS:    Urinalysis Basic - ( 2025 05:30 )    Color: x / Appearance: x / SG: x / pH: x  Gluc: 116 mg/dL / Ketone: x  / Bili: x / Urobili: x   Blood: x / Protein: x / Nitrite: x   Leuk Esterase: x / RBC: x / WBC x   Sq Epi: x / Non Sq Epi: x / Bacteria: x          CAPILLARY BLOOD GLUCOSE          Drug Levels: [] N/A  Phenytoin Level, Serum: 16.9 ug/mL ( @ 10:41)  Phenytoin Level, Serum: 18.2 ug/mL ( @ 05:30)  Phenytoin Level, Serum: 3.5 ug/mL ( @ 05:30)    CSF Analysis: [] N/A      Allergies    Allergy Status Unknown    Intolerances      MEDICATIONS:  Antibiotics:  piperacillin/tazobactam IVPB.. 4.5 Gram(s) IV Intermittent every 8 hours    Neuro:  acetaminophen   Oral Liquid .. 650 milliGRAM(s) Oral every 6 hours PRN  baclofen 5 milliGRAM(s) Oral every 12 hours  levETIRAcetam 1500 milliGRAM(s) Oral two times a day  phenytoin   Suspension 150 milliGRAM(s) Oral <User Schedule>  phenytoin   Suspension 200 milliGRAM(s) Oral <User Schedule>    Anticoagulation:  heparin   Injectable 5000 Unit(s) SubCutaneous every 8 hours    OTHER:  acetylcysteine 10%  Inhalation 4 milliLiter(s) Inhalation every 6 hours  albuterol/ipratropium for Nebulization 3 milliLiter(s) Nebulizer every 6 hours  amLODIPine   Tablet 5 milliGRAM(s) Oral daily  artificial tears (preservative free) Ophthalmic Solution 1 Drop(s) Right EYE every 4 hours  chlorhexidine 0.12% Liquid 15 milliLiter(s) Oral Mucosa every 12 hours  chlorhexidine 2% Cloths 1 Application(s) Topical <User Schedule>  doxazosin 8 milliGRAM(s) Oral at bedtime  erythromycin   Ointment 1 Application(s) Right EYE at bedtime  fluticasone propionate 50 MICROgram(s)/spray Nasal Spray 1 Spray(s) Both Nostrils two times a day  influenza   Vaccine 0.5 milliLiter(s) IntraMuscular once  ofloxacin 0.3% Solution 1 Drop(s) Right EYE four times a day  pantoprazole   Suspension 40 milliGRAM(s) Oral daily  petrolatum Ophthalmic Ointment 1 Application(s) Both EYES two times a day  propranolol 5 milliGRAM(s) Oral every 12 hours  sodium chloride 0.65% Nasal 1 Spray(s) Both Nostrils two times a day    IVF:    CULTURES:    RADIOLOGY & ADDITIONAL TESTS:      ASSESSMENT:  44M PMH ?stroke/MI present to [facility] after collapsing at work. Decorticate posturing, vomiting, intubated for airway protection. 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 Transferred to [facility] for further management. s/p trach 10/14. s/p peg 10/21. Re-upgrade to ICU 2/2 desaturation event and suctioning requirements 11/15. Re-upgrade to NSICU 12/15 2/2 desaturation and tachycardia.    Neuro:  - neuro/vitals q4h  - pain control: tylenol prn  - seizure tx: keppra 1500mg BID, phenytoin 150/150/200  - s/p vEEG (-)vEEG (10/3-) negative, (10/17-10/19) negative, (-) negative, (-), (-),  +focal motor seizures not correlating w/ EEG,  - CTH : enlarged pontine hemorrhage, CTH 10/3: stable, CTH 10/25: mostly resolved pontine hemorrhage, CTH 12/15: R mastoid air cell opacification; acute otitis media vs sterile effusion, CTH : stable. CTH  stable, CTH  stable  - MRI brain 10/12: parenchymal hemorrhage, acute/subacute R cerebellar stroke      CV:  - -160  - tachycardia: propranolol 5mg BID   - HTN: amlodipine 5mg  - echo () EF 75%, repeat : 57%    Resp:  - trach to vent, AC/VC 40/400/14/6  - Secretions: duonebs/mucomyst/chest PT q6h     GI:  - TFs via PEG  - bowel regimen held for loose stool, last BM   - baclofen 5q12 for hiccups    Renal:  - FW 200cc q4h for hydration  - Urinary retenion: Gabriel removed , SC prn  - CKD: trend BUN/Cr  - renal US 10/1, 10/8, 1/15: Increased bilateral renal echogenicity consistent with medical renal disease    Endo:  - A1c 5.4    Heme:  - DVT ppx: SCDs, SQH 5000u q8h   - LE dopplers negative     ID:  - last pan cx , +UTI, ?left infiltrate - f/u final read   - zosyn (- ), vanc dc'd, MRSA negative   - empiric PNA: cefepime (-), s/p vanc (-), s/p zosyn (12/15-), s/p vanc (12/15-), s/p zosyn ( -)  - s/p Stenotrophomonas maltophilia PNA: s/p Bactrim (-) s/p Cefepime (-)  - 11/3, (+) sputum for stenotrophomas maltophlia, blood cx (+) klebsiella, cefepime 2gq12 ( - )   - empiric tx: s/p zosyn (11/3-) , s/p vanc  (11/3-)  - S/p Ancef (10/4-10/14) for PNA, and s/p Unasyn (10/18-10/23) +actinobacter baumanii     MISC:  - ophtho consult for keratitis  - erythromycin ointment R eye q4hrs, ofloxacin ointment R eye QID, artificial tears R eye q2hrs, moisture chamber at bedtime    Dispo: SDU status, DNR, pending PRUCOL for benefits     D/w Dr. D'Amico

## 2025-01-29 NOTE — PROGRESS NOTE ADULT - ASSESSMENT
44M with unclear PMH (?stroke, MI) who was found down at work, intubated for airway protection and found to have acute parenchymal hemorrhage within nabil with mass effect (+ acute/subacute right cerebellar infarct) in setting of hypertensive emergency, transferred to [facility] for further neurosurgical care. Hospital course c/b poor neurologic recovery s/p trach-PEG, AUR s/p gabriel, ANIKA on CKD c/b hyperkalemia, HAP s/p amp-sulbactam (EOT 10/23), K aerogenes bacteremia  treated with 2 weeks of Cefepime per ID , On 11/15 he became septic and was transferred to NSICU due to increased o2 requirements and needed Vent support , Trach Cultures + for Stenotrophomonas which was treated with 7 days of Bactrim per ID , has been weaned of Vent since 11/23 and is now on 10lts at 40% o2 through Trach Collar. Stepped up to the NSICU on 12/15 for hypoxia and tachycardia. PE ruled out. On abx for 5 days. Unclear etiology. Now stepped down to 8Lach.    # Pontine hemorrhage  # Encephalopathy due to Intracranial Bleed   - Acute parenchymal hemorrhage within nabil with mass effect (+ acute/subacute right cerebellar infarct) in setting of hypertensive emergency.   - MRI brain also demonstrated acute/subacute R cerebellar stroke.   - No Neurosurgical Interventions were performed   - Secondary to IPH and cerebellar stroke patient has become Trach and PEG Dependent    #Febrile episode  - follow up cultures  - MRSA nares negative  - started on pseudomonal dosing of zosyn - if continues to be febrile, would reconsult ID    # Seizures  - Continue Seizure precautions   - Continue Keppra and phenytoin  - appreciate input from epilepsy    # Hypertension  - Continue amlodipine 5mg daily with holding parameters, uptitrate regimen as needed    # Chronic respiratory failure.   - s/p percutaneous trach by pulm on 10/14/24  - Currently on Vent Support   - Continue Chest PT    # Neurogenic dysphagia.   - Pt s/p PEG with surgery 10/21/24  - TF per nutrition  - aspiration precautions and elevation of HOB.    #Acute urinary retention.   - doxazosin 8mg  - continue to monitor urine outpatient     # Functional quadriplegia in setting of brainstem hemorrhage  - decub precautions  - care per nursing protocol

## 2025-01-29 NOTE — PROGRESS NOTE ADULT - SUBJECTIVE AND OBJECTIVE BOX
O/N Events: GEORGE  Subjective/ROS: Denies HA, CP, SOB, n/v, changes in bowel/urinary habits.  12pt ROS otherwise negative.    VITALS  Vital Signs Last 24 Hrs  T(C): 36.4 (29 Jan 2025 08:56), Max: 36.8 (28 Jan 2025 21:34)  T(F): 97.5 (29 Jan 2025 08:56), Max: 98.3 (28 Jan 2025 21:34)  HR: 70 (29 Jan 2025 08:45) (66 - 78)  BP: 120/88 (29 Jan 2025 08:45) (106/74 - 133/94)  BP(mean): 100 (29 Jan 2025 08:45) (86 - 109)  RR: 14 (29 Jan 2025 08:45) (14 - 16)  SpO2: 99% (29 Jan 2025 08:45) (98% - 100%)    Parameters below as of 29 Jan 2025 08:45  Patient On (Oxygen Delivery Method): ventilator    O2 Concentration (%): 40    I&O's Summary    28 Jan 2025 07:01  -  29 Jan 2025 07:00  --------------------------------------------------------  IN: 2840 mL / OUT: 1225 mL / NET: 1615 mL      CAPILLARY BLOOD GLUCOSE    PHYSICAL EXAM  General: minimally responsive on ventilator, NAD, no labored breathing, vEEG on   HEENT: trach collar in place, clean  Lungs: anterior exam, limited, no crackles, no wheezes  Heart: regular  Abdomen: soft, no distension, + BS  Extremities:  warm, trace edema, not following commands     MEDICATIONS  (STANDING):  acetylcysteine 10%  Inhalation 4 milliLiter(s) Inhalation every 6 hours  albuterol/ipratropium for Nebulization 3 milliLiter(s) Nebulizer every 6 hours  amLODIPine   Tablet 5 milliGRAM(s) Oral daily  artificial tears (preservative free) Ophthalmic Solution 1 Drop(s) Right EYE every 4 hours  baclofen 5 milliGRAM(s) Oral every 12 hours  chlorhexidine 0.12% Liquid 15 milliLiter(s) Oral Mucosa every 12 hours  chlorhexidine 2% Cloths 1 Application(s) Topical <User Schedule>  doxazosin 8 milliGRAM(s) Oral at bedtime  erythromycin   Ointment 1 Application(s) Right EYE at bedtime  fluticasone propionate 50 MICROgram(s)/spray Nasal Spray 1 Spray(s) Both Nostrils two times a day  heparin   Injectable 5000 Unit(s) SubCutaneous every 8 hours  influenza   Vaccine 0.5 milliLiter(s) IntraMuscular once  levETIRAcetam 1500 milliGRAM(s) Oral two times a day  ofloxacin 0.3% Solution 1 Drop(s) Right EYE four times a day  pantoprazole   Suspension 40 milliGRAM(s) Oral daily  petrolatum Ophthalmic Ointment 1 Application(s) Both EYES two times a day  phenytoin   Suspension 150 milliGRAM(s) Oral <User Schedule>  phenytoin   Suspension 200 milliGRAM(s) Oral <User Schedule>  piperacillin/tazobactam IVPB.. 4.5 Gram(s) IV Intermittent every 8 hours  propranolol 5 milliGRAM(s) Oral every 12 hours  sodium chloride 0.65% Nasal 1 Spray(s) Both Nostrils two times a day    MEDICATIONS  (PRN):  acetaminophen   Oral Liquid .. 650 milliGRAM(s) Oral every 6 hours PRN Temp greater or equal to 38C (100.4F), Mild Pain (1 - 3)      Allergy Status Unknown      LABS                        9.6    5.58  )-----------( 208      ( 29 Jan 2025 06:27 )             29.3     01-29    137  |  100  |  37[H]  ----------------------------<  107[H]  3.8   |  25  |  1.18    Ca    9.1      29 Jan 2025 06:27  Phos  3.9     01-29  Mg     2.0     01-29    TPro  6.9  /  Alb  3.2[L]  /  TBili  0.2  /  DBili  x   /  AST  12  /  ALT  24  /  AlkPhos  193[H]  01-28      Urinalysis Basic - ( 29 Jan 2025 06:27 )    Color: x / Appearance: x / SG: x / pH: x  Gluc: 107 mg/dL / Ketone: x  / Bili: x / Urobili: x   Blood: x / Protein: x / Nitrite: x   Leuk Esterase: x / RBC: x / WBC x   Sq Epi: x / Non Sq Epi: x / Bacteria: x    IMAGING/EKG/ETC: reviewed

## 2025-01-30 LAB
ANION GAP SERPL CALC-SCNC: 10 MMOL/L — SIGNIFICANT CHANGE UP (ref 5–17)
BUN SERPL-MCNC: 35 MG/DL — HIGH (ref 7–23)
CALCIUM SERPL-MCNC: 9.1 MG/DL — SIGNIFICANT CHANGE UP (ref 8.4–10.5)
CHLORIDE SERPL-SCNC: 101 MMOL/L — SIGNIFICANT CHANGE UP (ref 96–108)
CO2 SERPL-SCNC: 26 MMOL/L — SIGNIFICANT CHANGE UP (ref 22–31)
CREAT SERPL-MCNC: 1.14 MG/DL — SIGNIFICANT CHANGE UP (ref 0.5–1.3)
EGFR: 80 ML/MIN/1.73M2 — SIGNIFICANT CHANGE UP
EGFR: 80 ML/MIN/1.73M2 — SIGNIFICANT CHANGE UP
GLUCOSE SERPL-MCNC: 113 MG/DL — HIGH (ref 70–99)
HCT VFR BLD CALC: 30.8 % — LOW (ref 39–50)
HGB BLD-MCNC: 9.8 G/DL — LOW (ref 13–17)
MAGNESIUM SERPL-MCNC: 2 MG/DL — SIGNIFICANT CHANGE UP (ref 1.6–2.6)
MCHC RBC-ENTMCNC: 30 PG — SIGNIFICANT CHANGE UP (ref 27–34)
MCHC RBC-ENTMCNC: 31.8 G/DL — LOW (ref 32–36)
MCV RBC AUTO: 94.2 FL — SIGNIFICANT CHANGE UP (ref 80–100)
NRBC # BLD: 0 /100 WBCS — SIGNIFICANT CHANGE UP (ref 0–0)
NRBC BLD-RTO: 0 /100 WBCS — SIGNIFICANT CHANGE UP (ref 0–0)
PLATELET # BLD AUTO: 225 K/UL — SIGNIFICANT CHANGE UP (ref 150–400)
POTASSIUM SERPL-MCNC: 3.9 MMOL/L — SIGNIFICANT CHANGE UP (ref 3.5–5.3)
POTASSIUM SERPL-SCNC: 3.9 MMOL/L — SIGNIFICANT CHANGE UP (ref 3.5–5.3)
RBC # BLD: 3.27 M/UL — LOW (ref 4.2–5.8)
RBC # FLD: 13.2 % — SIGNIFICANT CHANGE UP (ref 10.3–14.5)
SODIUM SERPL-SCNC: 137 MMOL/L — SIGNIFICANT CHANGE UP (ref 135–145)
WBC # BLD: 6.04 K/UL — SIGNIFICANT CHANGE UP (ref 3.8–10.5)
WBC # FLD AUTO: 6.04 K/UL — SIGNIFICANT CHANGE UP (ref 3.8–10.5)

## 2025-01-30 PROCEDURE — 99231 SBSQ HOSP IP/OBS SF/LOW 25: CPT

## 2025-01-30 PROCEDURE — 99233 SBSQ HOSP IP/OBS HIGH 50: CPT

## 2025-01-30 RX ADMIN — Medication 1 SPRAY(S): at 18:55

## 2025-01-30 RX ADMIN — DOXAZOSIN MESYLATE 8 MILLIGRAM(S): 8 TABLET ORAL at 21:14

## 2025-01-30 RX ADMIN — ACETYLCYSTEINE 4 MILLILITER(S): 200 INHALANT RESPIRATORY (INHALATION) at 06:20

## 2025-01-30 RX ADMIN — Medication 1 DROP(S): at 15:25

## 2025-01-30 RX ADMIN — OFLOXACIN 1 DROP(S): 3 SOLUTION OPHTHALMIC at 18:53

## 2025-01-30 RX ADMIN — Medication 150 MILLIGRAM(S): at 07:22

## 2025-01-30 RX ADMIN — HEPARIN SODIUM 5000 UNIT(S): 1000 INJECTION INTRAVENOUS; SUBCUTANEOUS at 21:13

## 2025-01-30 RX ADMIN — ACETYLCYSTEINE 4 MILLILITER(S): 200 INHALANT RESPIRATORY (INHALATION) at 18:53

## 2025-01-30 RX ADMIN — AMLODIPINE BESYLATE 5 MILLIGRAM(S): 10 TABLET ORAL at 06:14

## 2025-01-30 RX ADMIN — Medication 15 MILLILITER(S): at 18:55

## 2025-01-30 RX ADMIN — Medication 1 APPLICATION(S): at 06:37

## 2025-01-30 RX ADMIN — Medication 1 DROP(S): at 18:54

## 2025-01-30 RX ADMIN — Medication 1 DROP(S): at 21:14

## 2025-01-30 RX ADMIN — FLUTICASONE PROPIONATE 1 SPRAY(S): 50 SPRAY, METERED NASAL at 18:56

## 2025-01-30 RX ADMIN — Medication 1 DROP(S): at 09:38

## 2025-01-30 RX ADMIN — BACLOFEN 5 MILLIGRAM(S): 10 INJECTION INTRATHECAL at 06:13

## 2025-01-30 RX ADMIN — FLUTICASONE PROPIONATE 1 SPRAY(S): 50 SPRAY, METERED NASAL at 06:13

## 2025-01-30 RX ADMIN — OFLOXACIN 1 DROP(S): 3 SOLUTION OPHTHALMIC at 06:00

## 2025-01-30 RX ADMIN — OFLOXACIN 1 DROP(S): 3 SOLUTION OPHTHALMIC at 13:12

## 2025-01-30 RX ADMIN — Medication 1 APPLICATION(S): at 18:55

## 2025-01-30 RX ADMIN — ERYTHROMYCIN 1 APPLICATION(S): 5 OINTMENT OPHTHALMIC at 21:13

## 2025-01-30 RX ADMIN — Medication 40 MILLIGRAM(S): at 13:13

## 2025-01-30 RX ADMIN — LEVETIRACETAM 1500 MILLIGRAM(S): 10 INJECTION, SOLUTION INTRAVENOUS at 06:11

## 2025-01-30 RX ADMIN — HEPARIN SODIUM 5000 UNIT(S): 1000 INJECTION INTRAVENOUS; SUBCUTANEOUS at 06:12

## 2025-01-30 RX ADMIN — IPRATROPIUM BROMIDE AND ALBUTEROL SULFATE 3 MILLILITER(S): .5; 2.5 SOLUTION RESPIRATORY (INHALATION) at 18:53

## 2025-01-30 RX ADMIN — Medication 1 SPRAY(S): at 06:14

## 2025-01-30 RX ADMIN — BACLOFEN 5 MILLIGRAM(S): 10 INJECTION INTRATHECAL at 18:56

## 2025-01-30 RX ADMIN — Medication 1 APPLICATION(S): at 06:36

## 2025-01-30 RX ADMIN — IPRATROPIUM BROMIDE AND ALBUTEROL SULFATE 3 MILLILITER(S): .5; 2.5 SOLUTION RESPIRATORY (INHALATION) at 06:12

## 2025-01-30 RX ADMIN — Medication 150 MILLIGRAM(S): at 12:00

## 2025-01-30 RX ADMIN — Medication 1 DROP(S): at 05:59

## 2025-01-30 RX ADMIN — Medication 25 GRAM(S): at 06:07

## 2025-01-30 RX ADMIN — Medication 200 MILLIGRAM(S): at 21:06

## 2025-01-30 RX ADMIN — OFLOXACIN 1 DROP(S): 3 SOLUTION OPHTHALMIC at 15:26

## 2025-01-30 RX ADMIN — LEVETIRACETAM 1500 MILLIGRAM(S): 10 INJECTION, SOLUTION INTRAVENOUS at 18:56

## 2025-01-30 RX ADMIN — ACETYLCYSTEINE 4 MILLILITER(S): 200 INHALANT RESPIRATORY (INHALATION) at 13:12

## 2025-01-30 RX ADMIN — Medication 15 MILLILITER(S): at 06:12

## 2025-01-30 RX ADMIN — HEPARIN SODIUM 5000 UNIT(S): 1000 INJECTION INTRAVENOUS; SUBCUTANEOUS at 15:24

## 2025-01-30 RX ADMIN — IPRATROPIUM BROMIDE AND ALBUTEROL SULFATE 3 MILLILITER(S): .5; 2.5 SOLUTION RESPIRATORY (INHALATION) at 13:12

## 2025-01-30 RX ADMIN — Medication 20 MILLIEQUIVALENT(S): at 09:39

## 2025-01-30 NOTE — PROGRESS NOTE ADULT - SUBJECTIVE AND OBJECTIVE BOX
Neurology Progress Note    Interval History:  The patient was seen and examined at the bedside. Appears more interactive today (similar to evaluation last week).       PAST MEDICAL & SURGICAL HISTORY:  Acute myocardial infarction    CVA (cerebral vascular accident)            Medications:  acetaminophen   Oral Liquid .. 650 milliGRAM(s) Oral every 6 hours PRN  acetylcysteine 10%  Inhalation 4 milliLiter(s) Inhalation every 6 hours  albuterol/ipratropium for Nebulization 3 milliLiter(s) Nebulizer every 6 hours  amLODIPine   Tablet 5 milliGRAM(s) Oral daily  artificial tears (preservative free) Ophthalmic Solution 1 Drop(s) Right EYE every 4 hours  baclofen 5 milliGRAM(s) Oral every 12 hours  chlorhexidine 0.12% Liquid 15 milliLiter(s) Oral Mucosa every 12 hours  chlorhexidine 2% Cloths 1 Application(s) Topical <User Schedule>  doxazosin 8 milliGRAM(s) Oral at bedtime  erythromycin   Ointment 1 Application(s) Right EYE at bedtime  fluticasone propionate 50 MICROgram(s)/spray Nasal Spray 1 Spray(s) Both Nostrils two times a day  heparin   Injectable 5000 Unit(s) SubCutaneous every 8 hours  influenza   Vaccine 0.5 milliLiter(s) IntraMuscular once  levETIRAcetam 1500 milliGRAM(s) Oral two times a day  levoFLOXacin  Tablet 750 milliGRAM(s) Oral every 24 hours  ofloxacin 0.3% Solution 1 Drop(s) Right EYE four times a day  pantoprazole   Suspension 40 milliGRAM(s) Oral daily  petrolatum Ophthalmic Ointment 1 Application(s) Both EYES two times a day  phenytoin   Suspension 150 milliGRAM(s) Oral <User Schedule>  phenytoin   Suspension 200 milliGRAM(s) Oral <User Schedule>  propranolol 5 milliGRAM(s) Oral every 12 hours  sodium chloride 0.65% Nasal 1 Spray(s) Both Nostrils two times a day      Vital Signs Last 24 Hrs  T(C): 36.3 (30 Jan 2025 13:57), Max: 36.8 (29 Jan 2025 22:33)  T(F): 97.4 (30 Jan 2025 13:57), Max: 98.2 (29 Jan 2025 22:33)  HR: 62 (30 Jan 2025 12:35) (60 - 72)  BP: 112/79 (30 Jan 2025 12:00) (105/72 - 137/88)  BP(mean): 91 (30 Jan 2025 12:00) (85 - 111)  RR: 14 (30 Jan 2025 12:35) (14 - 18)  SpO2: 100% (30 Jan 2025 12:35) (95% - 100%)    Parameters below as of 30 Jan 2025 12:35  Patient On (Oxygen Delivery Method): ventilator    O2 Concentration (%): 40    Neurological Exam:   Mental status: Awake, alert, opening and closing eyes appropriately to name and command. Intermittently tracking interviewer. Follows commands to close and open eyes. Facial twitching localized to the R lip only, appears improved from prior.  +Blink to threat on L eye only. Intermittent rapid vertical eye movements. DTRs 3+ throughout. Wiggles toes on the R foot and R fingers to command.    Labs:  CBC Full  -  ( 30 Jan 2025 05:30 )  WBC Count : 6.04 K/uL  RBC Count : 3.27 M/uL  Hemoglobin : 9.8 g/dL  Hematocrit : 30.8 %  Platelet Count - Automated : 225 K/uL  Mean Cell Volume : 94.2 fl  Mean Cell Hemoglobin : 30.0 pg  Mean Cell Hemoglobin Concentration : 31.8 g/dL  Auto Neutrophil # : x  Auto Lymphocyte # : x  Auto Monocyte # : x  Auto Eosinophil # : x  Auto Basophil # : x  Auto Neutrophil % : x  Auto Lymphocyte % : x  Auto Monocyte % : x  Auto Eosinophil % : x  Auto Basophil % : x    01-30    137  |  101  |  35[H]  ----------------------------<  113[H]  3.9   |  26  |  1.14    Ca    9.1      30 Jan 2025 05:30  Phos  4.2     01-30  Mg     2.0     01-30          Urinalysis Basic - ( 30 Jan 2025 05:30 )    Color: x / Appearance: x / SG: x / pH: x  Gluc: 113 mg/dL / Ketone: x  / Bili: x / Urobili: x   Blood: x / Protein: x / Nitrite: x   Leuk Esterase: x / RBC: x / WBC x   Sq Epi: x / Non Sq Epi: x / Bacteria: x        Assessment:  This is a 44y Male w/ h/o     Plan:

## 2025-01-30 NOTE — PROGRESS NOTE ADULT - SUBJECTIVE AND OBJECTIVE BOX
HPI:  45 yo M, unknown past medical history (reported stroke and MI by coworkers) presented to [facility] with AMS, Pt was working at [employer] when he was found down by coworkers. EMS called and pt brought to [facility] ED. Intubated, sedated, started on cardene for SBPs in 200s. CT head showed brain stem bleed. Transferred to NSICU for further management.  (30 Sep 2024 12:55)    S/Overnight events: GEORGE overnight, neuro stable.      Hospital Course:   : transferred from [facility]. A line placed. Versed dc'd. Cy Rader at bedside, states pt has family in [place], cannot confirm medications or PMH other than stroke and MI. 250cc bolus 3% given. LR switched to NS. hydralazine 25q8 started, 3% started, switched propofol to precedex   10/1: stability CTH done. Added labetalol, started TF. Palliative consulted. ethics consulted to determine surrogate. febrile 103, pan cx sent  10/2: BD 2, GEORGE overnight. TF resumed. Desatt'd to 80s, FiO2 inc. to 50. Fentanyl given, ABG, CXR ordered. Maxxed on precedex, started on propofol for DARIEN -4 - -5. Precedex dc'd. Duonebs, mucomyst, hypertonic added. 3% dc'd. Cardene dc'd. Start vanc/CTX. Increased labetalol 200q8. MRSA negative, dc'd vanc. ETT pulled back 2cm x 2, good positioning after confirmatory chest xray. Ethics attempting to establish HCP with family. Na 159, starting FW 250q6 for range 150-155.   10/3: BD3, GEORGE o/n, neuro stable. Na elevating, FW increased to 300q6. Dc'd bowel reg for diarrhea. vEEG started. SQH 5000q8 tonight.   10/4: BD 4, albumn bolus, incr. LR to 80 2/2 incr. in Cr, LR to 10 0cc/hr for uptrending Cr. Started 7% hypersal for 48hrs and SL atropine for inline/oral thick secretions. Dc'd CTX and started ancef for MSSA in the sputum. Nephrology consulted for CKD, f/u recs. SBP 170s, given hydralazine 10mg IVP.   10/5: BD5, o/n 10mg IVP hydralazine given for SBP 170s and started on hydralazine 25q8 via OGT. 10mg IV push labetalol for SBP > 160s. RT placed for diarrhea.   10/6: BD6, o/n FW increased to 350q4 per nephrology recs. IV tylenol for temp 100.6, SBp 160s presumed uncomfortable.   10/7: BD7, overnight pancultured for temp 101.8F.   10/8: BD8. GEORGE. Cr bumped. decreased LR to 75cc/hr. Adding simethicone ATC. incr hydralazine 50mgTID. Incr labetalol 300mgTID. Na 145, decreased FWF to 250q6. Start precedex. FENa consistent with intrinsic kidney injury. Pend repeat renal US. Retaining up to 1.3L, bladder scans q6, straight cath PRN  10/9: BD 9. GEORGE overnight. Neuro stable. abd xray for distention w non-specific gas pattern, OGT to LIWS for morning. duonebs/mucomyst to q8 for improving secretions. Changed tube feeds to Jevity 1.5 20cc/hr, low rate due to abdominal distention, nepro dense and more difficult to digest. Tolerating CPAP, confirmed by ABG.   10/10: BD 10. GEORGE overnight. Neuro stable. (+) gabriel for urinary retention on bladder scan. inc TF to goal rate of 40cc/hr. family leaning toward pursuing trach/PEG. 1/2 amp for FS 81.   10/11: BD 11. GEORGE overnight. Neuro stable. Trach/PEG consults placed.   10/12: BD 12. GEORGE overnight. Neuro stable. MRI brain complete.   10/13: BD 13. Increase flomax. Hold SQH after PM dose for trach tm. IVL.   10/14: BD 14. GEORGE overnight, remains on AC/VC. Gabriel placed for urinary retention. Dc'd free water.  S/p trach with pulm. NGT placed and CXR confirmed in good position.   10/15: BD 15, GEORGE ovn. resumed feeds. spiked 101, pan cx sent.   10/16: BD 16. GEORGE ovn. Lokelma 5mg for K+ 5.4. Started vanc q 24/zosyn for empiric PNA coverage, IVF to 100/hr. PEG held for fever.   10/17: BD 17,  ordered serum osm and urine osm for am. Started sinemet for neurostimulation. Increased cardura to 0.8. Started FW 100q4, dc'd IVF. MRSA negative, dc'd vanc. NGT replaced d/t coiling.   10/18: BD 18, GEORGE overnight, neuro stable. Amantadine added for neurostim. zosyn changed to unasyn for acinetobacter baumannii, failed TOV and required SC  10/19: BD 19, GEORGE ovn. cardura 2mg added for retention. labetalol decreased 200q8, hydralazine decreased 25q8. Gabriel replaced.   10/20: BD20, GEORGE overnight. NGT dislodged, replaced. PEG tomorrow w/ gen surg, FW increased to 150q4 and labetalol decreased to 100q8, lokelma given for hyperkalemia.   10/21: BD 21. POD0 PEG placement with Gen surg. decr labetolol to 50q8, incr. cardura to 0.4, started lokelma and phoslo, dc gabriel POD0 PEG placement with Gen surg.  10/22: BD 22. Plan to start TF today via PEG. dc labetalol, Following ophtho recs. Increased apnea settings - found to be in cheyne-moe respiration. CPAP 5/5.  10/23: BD 23. hydralazine d/c'd, trach collar trial today. Rectal tube placed at 6am.  10/24: BD24, o/n lokelma held due to diarrhea. Free water 100q6 resumed. dc'd tamsulosin, amantadine. Incr'd cardura to 8mg qhs. Dc'd FW. Switched jevity to nepro. gabriel placed for high urine output. Started SL atropine for oral secretions. Dc'd free water.  10/25: BD25, o/n decreased suctioning requirements to > q4hrs, GEORGE. Cr improving, cont phoslo, lokelma held at this time. Gabriel placed yest, cont. Tolerating trach collar. Given 500cc plasmalyte bolus for ANIKA. Dc'd sinemet.   10/26: BD26, o/n resumed lokelma 5mg daily and resumed 100cc free water q6hrs. Change in neuro status with new right pupillary dilation with anisocoria (right pupil 6mm fixed and left pupil 3mm briskly reactive). Given 23.4% NaCl bullet, taken for emergent CTH showing mostly resolved pontine hemorrhage, continued brainstem hypodensity likely edema d/t hemorrhage, no new hemorrhage or infarct, no herniation, mild increase in size of left lateral ventricle. Vitals remaine stable. Na goal > 140.   10/27: BD27, o/n GEORGE.Neuro stable. Pend stepdown with airway bed.   10/28: BD 28. GEORGE overnight. Neuro stable. Miralax ordered. Gabriel removed, pending TOV.  10/29: BD 29. GEORGE o/n. Given 2L NS over 8 hrs for increased BUN/Cr ratio. Gabriel placed for frequent straight cath.   10/30: BD 30.   10/31: BD 31. GEORGE overnight. Na 149, increased free water to 200q6. 1L NS for uptrending BUN.   : BD 32. GEORGE overnight. Given 1L NS for dehydration. Na 146, increased FW to 250q6.   : BD 33 GEORGE overnight, neuro stable, given 500cc bolus for net negative status and tachycardia   11/3: BD 34, GEORGE overnight, neuro stable. Patient remains tachycardic, EKG showing sinus tachycardia, given additional 500cc NS bolus. Febrile to 101.9F, pan cultured (without UA), CXR WNL, given tylenol.   : BD 35, GEORGE overnight, neuro stable. Given 1L NS for tachycardia. sputum (+) for stenotropohomas maltophilia.   : BD 36 GEORGE overnight, neuro stable. Vancomycin dc'd. Chest PT BID. ID consulted, cont zosyn.  : BD 37. blood cx + klebsiella dc zosyn changed to cefepime, CTAP ordered, rpt blood cx sent.    : BD 38. Pending CT A/P, given 250cc bolus and starting maintenance fluids overnight. Pending CT A/P after bolus   : BD 39. CT CAP negative for infection.   : BD 40. GEORGE overnight.  11/10: BD 41. GEORGE overnight. desat to 85 on trach collar, O2 inc to 10L and 100%, O2 sat inc to 95. pt tachy to 110s, euvolemic. given tylenol. ABG and CXR ordered. spiked fever, pancultured, RVP negative. AM ABG w pO2 79, rpt w pO2 79. pt appears comfortable, satting 94%.   : BD 42. GEORGE overnight. pt became tachy to 130s, desat to 90 on 100% FiO2 and 10L. suctioned, (+) productive cough. temp 101.4, given 1g IV tylenol and 500cc NS bolus for euvolemia. fever and HR downtrending. LE dopplers negative for dvt  : BD 43, GEORGE ovn, fever and HR downtrending, satting 97% 70% FIO2  : BD 44, GEORGE ovn. started standing tylenol x24 hours for tachycardia. desat to 80s, o2 increased. CXR stable, pending CTA PE protocol.   : BD 45, GEORGE overnight, neuro stable. resp therapy dec FiO2 to 70%.   11/15: BD 46, GEORGE overnight, neuro stable.  Rapid called for desaturation 30s, tachycardic 140s. Patient bagged, 100% fio2, heavily suctioned. CXR/POCUS unremarkable. ABG c/w desaturation. WBC 14.71. Afebrile. O2 improved to 90s and patient upgraded to ICU. ABG paO2 30s improved to 89 on vent. IV Tylenol x 1, sputum sent. Start protonix while o-n vent.   : BD 47. POCUS showed collapsable IVCF, given 1L bolus. Vanco/Cefpime added empriic for PNA, NGT feeds restarted. MRSA swab neg, Vanc DC'd.   : BD 48. GEORGE overnight. 1l bolus for tachycardia. Spiked to 101, cultured. 500cc bolus for tachycardia, tachy to 148 given 25mcg fentanyl, 250cc albumin, 1.5L bolus. 5 IV lopressor with response HR to 100s. +Stenotrophomonas on sputum cx.   : BD 49. GEORGE overnight. Consulted ID, cefepime switched to bactrim x7days. Started hydrochlorothiazine 12.5mg daily.  : BD 50. Tachy 120s, given tylenol and 500cc NS. Tolerating 5/5, switched to TCx. Holding phos binder. D/c Bactrim. D/c gabriel, f/u TOV. Dc'd PPI.   : BD 51. GEORGE ovn. 1600 satting low 90s, mildly tachy to 110s, afebrile, RR wnl. O2 improved to mid 90s while inc O2 to 100% on TCx. CPAP 5/5 placed back on.  : BD 52, GEORGE ovn, tolerating CPAP 5/5. Switch to trach collar during the day if tolerating well. HCTZ held for Cr bump, straight cath frequence increased to q4  : BD 53, GEORGE ovn. Resumed phoslo. Gabriel placed. Resumed HCTZ.   : BD 54. Holding tylenol in setting of possible fever, will require pan cx if febrile. Cr improved today. Cont CPAP. Bowel regimen held i/s/o diarrhea. FOBT negative.  : BD 55. GEORGE overnight. Neuro stable. HCTZ dc'ed, started lisinopril 5. Lokelma dc'ed for K 3.7.   : BD 56, GEORGE overnight. Neuro stable. dc'd lisinopril 5mg. Gabriel dc'd. TOV. 1545 noted to be hypotensive, MAP 50, in supine position on chair, HR 60s, afebrile, O2 96%. Given 1L cc NS bolus, placed back on bed in reverse trendelenberg, improved to map of 66. Neostick at bedside. Vitals check q1h. Dc'ed amlodipine. Failed TOV, bladder scan q6, sc prn. Added back Senna.   : BD 57, GEORGE overnight. Neuro stable. Dc'd phoslo.   : BD 58, GEORGE overnight. Neuro stable.    : BD 59. Gabriel replaced.   : GEORGE.  : GEORGE, neuro stable.   : BD 62, GEORGE overnight  : BD 63, GEORGE overnight.; Given 1L bolus of LR for uptrending BUN/Cr.  12/3: BD 64. Reinstated eye gtt/moisture chamber given increased Rt eye injection  : BD 65. GEORGE overnight. Attempted to speak with ophtho regarding eyelid weight/closure but no answer, full mailbox.   : BD 66, GEORGE overnight, bowel regimen increased and had BM.   : BD 67, GEORGE overnight, neuro stable.  : GEORGE overnight, neuro stable. /110s, given x1 hydralazine 10 mg IVP. Restarted home amlodipine 5mg.  : GEOREG. OOB to chair.     : GEORGE, mucomyst added for thick secretions, simethicone for abd distension, abd xray with stool burden, increased bowel regimen.   : GEORGE overnight.   : GEORGE overnight.   : GEORGE overnight  12/15: o/n Patient became tachycardic HR 120s and 10 minutes later O2 sat dropped as low as 89%. Patient suctioned without improvement in O2 sat and tube feeds found in suction catheter. TFs held.  STAT CXR ordered. STAT labs sent. Respiratory therapist called to bedside and patient trach connected to ventilator. After connection to ventilator and further suctioning O2 sat improved to 97% but patient HR remains 120-130s. Upgraded to NSICU for further management. Vancomycin and zosyn started. CTA  chest PE protocol and CTH ordered. Blood cultures sent.given 500cc bolus, rpt ABG sent pO2 243, CTH and CTA chest done. FS while NPO, FiO2 dec 50 pending ABG. sputum cx positive for few GPC and GNR.   : GEORGE overnight, restarted amlodipine, troponin 75   : GEORGE overnight, neuro stable. Attempted CPAP this morning, did not tolerate and back on full vent support. Dc'd Vanc. Tachycardia to 140s, noted extremities to be twitching along with jaw twitching. Given 2g of Keppra, total 4mg ativan and placed on EEG, full set of labs and lactate negative. Resumed trickle feeds at 20cc/hr. Given 250cc albumin for tachycardia.   : GEORGE overnight. Neuro stable. CTH stable. EEG negative and dc'd.   : GEORGE overnight. neuro stable. SIMV most of day, AC/VC at night.   : GEORGE overnight, neuro stable. remains on VC/AC  : GEORGE ovn. 1L bolus for tachy to 120s-130s.   : GEORGE ovn. Tachy to 120s, given tylenol and IVF. 2mg IV ativan given for L jaw twitching. Sx resolved for 3 minutes and started again. Epilepsy contacted. Given 2mg IV ativan. Continued twitching. Increased keppra to 1500mg BID, 2mg ativan given. Propranolol started for refractory tachycardia. vEEG ordered. albumin given. POCUS performed, no b lines. Started on fosphenytoin, loaded w 20mg/kg then 100mg TID. febrile, pancx, started cefepime 2g q8, vancomycin  : GEORGE ovn. +focal motor seizures on eeg. ID consulted. Vancomycin dc'ed as per ID.  : GEORGE ovn, neuro stable. Baclofen 5 mg q12 started for hiccups. Na 129 from 134. Urine lytes c/w SIADH. Given 250cc 3% bolus. CT chest for infection w/u - f/u read. Repeat Na 136. UA negative  : GEORGE ovn.   : GEORGE ovn  : GEORGE overnight. Blood cx neg @ 4 days, DC cefepime per medicine (sputum colonized).   : Desaturation o/n to 80's, CXR obtained, pulse ox changed and sats resolved. Na 133 from 138, 250cc 3% bolus given. Cefepime resumed until  per ID. Rpt Na 138.  : GEORGE overnight. Na stable.   : GEORGE overnight. Family meeting with son, pt now DNR.   : GEORGE overnight.   : GEORGE overnight, neuro stable.  : GEORGE overnight, neuro stable. FW decreased to 100q6.   1/3: GEORGE overnight, neuro stable. fosphenytoin IV changed to pheytoin PO via PEG per epilepsy recommendations  : GEORGE overnight, neuro stable. FW incr. to 100q4  : GEORGE overnight, neuro stable.   : GEORGE overnight, neuro stable   : GEORGE overnight, neuro stable. BUN/Cr increased, increased FW to 200q6. Given 1L bolus of LR over 2 hours. Gabriel d/c'd, voiding, continue bladder scan q6.   : GEORGE overnight, neuro stable. BUN/Cr improving.  : GEORGE overnight, neuro stable.   1/10: GEORGE overnight, neuro stable.   : GEORGE overnight, neuro stable, vent settings stable.   : GEORGE overnight, neuro stable. Febrile to 102F, f/u pan cx, chest xray, given tylenol. Zosyn started.   : GEORGE overnight, neuro stable, cont Zosyn for presumed PNA, prelim sputum cx growing; few GS neg diplococci, mod GS neg rods, rare GS + rods, fever trend improved. Free water increased to [id number].   : GEORGE overnight. pending renal US for elevated Cr  1/15: GEORGE ovn. renal US complete.   : GEORGE overnight, neuro stable   : GEORGE overnight, neuro stable   : GEORGE overnight, neuro stable.   : GEORGE overnight, neuro stable. Propranolol dec to 5q8.   : GEORGE overnight, neuro stable. Weaned propranolol to 5mg BID.   : GEORGE ovn, in AM having rapid vertical eye movements with facial twitching, 2g total keppra given for AM dose and phenytoin level ordered, vital signs stable. CTH stable. Phenytoin increased to 100/100/150mg per epilepsy  : GEORGE ovn. IV hydral x 1 for SBP 170s.   : Cont to have focal motor seizures. Per epilepsy, changed phenytoin dose to 100/100/200.   : Phenytoin lvl tmrw AM. Chest Xray completed due to temp 100.2F  : GEORGE overnight. Incr dilantin morning and afternoon per epilepsy.   : GEORGE overnight. Sustained focal twitching noticed by nursing. Ativan 8mg in total given. Fosphenytoin 1500mg IV given. Placed on EEG. Epilepsy following. TFs held. Phenytoin increased to 150/150/200.   : o/n CTH completed due to continued lethargy after ativan given yesterday afternoon. TFs resumed. 500cc bolus NS given for SBP 90s. EEG dc'ed, discussed w/ Dr. Tomlin. Febrile 101F, pan cx sent. Likely left sided infiltrate on CXR, c/f aspiration PNA. Started on vanc/zosyn. MRSA swab pending.   : Neuro stable, on vanc/zosyn. +UTI on UA, MRSA negative. Vanc dc'd. RVP negative. Zosyn increased to pseudomonal dosing.   : GEORGE overnight, neuro stable.  : GEORGE overnight, neuro stable.       Vital Signs Last 24 Hrs  T(C): 36.8 (2025 22:33), Max: 36.8 (2025 22:33)  T(F): 98.2 (2025 22:33), Max: 98.2 (2025 22:33)  HR: 72 (2025 20:20) (66 - 74)  BP: 131/90 (2025 20:20) (106/74 - 137/88)  BP(mean): 107 (2025 20:20) (86 - 108)  RR: 16 (2025 20:20) (14 - 16)  SpO2: 99% (2025 20:20) (98% - 100%)    Parameters below as of 2025 20:20  Patient On (Oxygen Delivery Method): ventilator    O2 Concentration (%): 40    I&O's Detail    2025 07:01  -  2025 07:00  --------------------------------------------------------  IN:    Enteral Tube Flush: 560 mL    Free Water: 1000 mL    IV PiggyBack: 200 mL    Miscellaneous Tube Feedin mL  Total IN: 2840 mL    OUT:    Oral Fluid: 0 mL    Voided (mL): 1225 mL  Total OUT: 1225 mL    Total NET: 1615 mL      2025 07:01  -  2025 00:09  --------------------------------------------------------  IN:    Enteral Tube Flush: 240 mL    Free Water: 400 mL    IV PiggyBack: 100 mL    Miscellaneous Tube Feedin mL  Total IN: 1340 mL    OUT:    Oral Fluid: 0 mL    Voided (mL): 300 mL  Total OUT: 300 mL    Total NET: 1040 mL        I&O's Summary    2025 07:  -  2025 07:00  --------------------------------------------------------  IN: 2840 mL / OUT: 1225 mL / NET: 1615 mL    2025 07:01  -  2025 00:09  --------------------------------------------------------  IN: 1340 mL / OUT: 300 mL / NET: 1040 mL        PHYSICAL EXAM:  General: patient seen laying supine in bed in NAD  Neuro: opens eyes to voice, does not follow commands, RUE trace squeeze, b/l lower extremities withdraw,  LUE 0/5, mild mouth and R eye twitching  HEENT: PERRL, +trach to vent  Neck: supple  Cardiac: RRR, S1S2  Pulmonary: chest rise symmetric  Abdomen: soft, nontender, nondistended, +PEG  Ext: perfusing well  Skin: warm, dry      TUBES/LINES:  [] CVC  [] A-line  [] Lumbar Drain  [] Ventriculostomy  [] Other    DIET:  [] NPO  [] Mechanical  [x] Tube feeds    LABS:    Urinalysis Basic - ( 2025 06:27 )    Color: x / Appearance: x / SG: x / pH: x  Gluc: 107 mg/dL / Ketone: x  / Bili: x / Urobili: x   Blood: x / Protein: x / Nitrite: x   Leuk Esterase: x / RBC: x / WBC x   Sq Epi: x / Non Sq Epi: x / Bacteria: x          CAPILLARY BLOOD GLUCOSE      POCT Blood Glucose.: 107 mg/dL (2025 22:26)      Drug Levels: [] N/A  Phenytoin Level, Serum: 16.9 ug/mL ( @ 10:41)  Phenytoin Level, Serum: 18.2 ug/mL ( @ 05:30)  Phenytoin Level, Serum: 3.5 ug/mL ( @ 05:30)    CSF Analysis: [] N/A      Allergies    Allergy Status Unknown    Intolerances      MEDICATIONS:  Antibiotics:  piperacillin/tazobactam IVPB.. 4.5 Gram(s) IV Intermittent every 8 hours    Neuro:  acetaminophen   Oral Liquid .. 650 milliGRAM(s) Oral every 6 hours PRN  baclofen 5 milliGRAM(s) Oral every 12 hours  levETIRAcetam 1500 milliGRAM(s) Oral two times a day  phenytoin   Suspension 150 milliGRAM(s) Oral <User Schedule>  phenytoin   Suspension 200 milliGRAM(s) Oral <User Schedule>    Anticoagulation:  heparin   Injectable 5000 Unit(s) SubCutaneous every 8 hours    OTHER:  acetylcysteine 10%  Inhalation 4 milliLiter(s) Inhalation every 6 hours  albuterol/ipratropium for Nebulization 3 milliLiter(s) Nebulizer every 6 hours  amLODIPine   Tablet 5 milliGRAM(s) Oral daily  artificial tears (preservative free) Ophthalmic Solution 1 Drop(s) Right EYE every 4 hours  chlorhexidine 0.12% Liquid 15 milliLiter(s) Oral Mucosa every 12 hours  chlorhexidine 2% Cloths 1 Application(s) Topical <User Schedule>  doxazosin 8 milliGRAM(s) Oral at bedtime  erythromycin   Ointment 1 Application(s) Right EYE at bedtime  fluticasone propionate 50 MICROgram(s)/spray Nasal Spray 1 Spray(s) Both Nostrils two times a day  influenza   Vaccine 0.5 milliLiter(s) IntraMuscular once  ofloxacin 0.3% Solution 1 Drop(s) Right EYE four times a day  pantoprazole   Suspension 40 milliGRAM(s) Oral daily  petrolatum Ophthalmic Ointment 1 Application(s) Both EYES two times a day  propranolol 5 milliGRAM(s) Oral every 12 hours  sodium chloride 0.65% Nasal 1 Spray(s) Both Nostrils two times a day    IVF:    CULTURES:  Culture Results:   >100,000 CFU/ml Klebsiella aerogenes (Previously Enterobacter) ( @ 23:20)  Culture Results:   No growth at 24 hours ( @ 22:35)    RADIOLOGY & ADDITIONAL TESTS:      ASSESSMENT:  44M PMH ?stroke/MI present to [facility] after collapsing at work. Decorticate posturing, vomiting, intubated for airway protection. 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 Transferred to [facility] for further management. s/p trach 10/14. s/p peg 10/21. Re-upgrade to ICU 2/2 desaturation event and suctioning requirements 11/15. Re-upgrade to NSICU 12/15 2/2 desaturation and tachycardia.    Neuro:  - neuro/vitals q4h  - pain control: tylenol prn  - seizure tx: keppra 1500mg BID, phenytoin 150/150/200  - s/p vEEG (-)vEEG (10/3-) negative, (10/17-10/19) negative, (-) negative, (-), (-),  +focal motor seizures not correlating w/ EEG,  - CTH : enlarged pontine hemorrhage, CTH 10/3: stable, CTH 10/25: mostly resolved pontine hemorrhage, CTH 12/15: R mastoid air cell opacification; acute otitis media vs sterile effusion, CTH : stable. CTH  stable, CTH  stable  - MRI brain 10/12: parenchymal hemorrhage, acute/subacute R cerebellar stroke      CV:  - -160  - tachycardia: propranolol 5mg BID   - HTN: amlodipine 5mg  - echo () EF 75%, repeat : 57%    Resp:  - trach to vent, AC/VC 40/400/14/6  - Secretions: duonebs/mucomyst/chest PT q6h     GI:  - TFs via PEG  - bowel regimen held for loose stool, last BM   - baclofen 5q12 for hiccups    Renal:  - FW 200cc q4h for hydration  - Urinary retenion: Gabriel removed , SC prn  - CKD: trend BUN/Cr  - renal US 10/1, 10/8, 1/15: Increased bilateral renal echogenicity consistent with medical renal disease    Endo:  - A1c 5.4    Heme:  - DVT ppx: SCDs, SQH 5000u q8h   - LE dopplers negative     ID:  - last pan cx , +klebsiella UTI, ?left infiltrate - f/u final read   - zosyn (- 2/3), vanc dc'd, MRSA negative   - empiric PNA: cefepime (-), s/p vanc (-), s/p zosyn (12/15-), s/p vanc (12/15-), s/p zosyn ( -)  - s/p Stenotrophomonas maltophilia PNA: s/p Bactrim (-) s/p Cefepime (-)  - 11/3, (+) sputum for stenotrophomas maltophlia, blood cx (+) klebsiella, cefepime 2gq12 ( - )   - empiric tx: s/p zosyn (11/3-) , s/p vanc  (11/3-)  - S/p Ancef (10/4-10/14) for PNA, and s/p Unasyn (10/18-10/23) +actinobacter baumanii     MISC:  - ophtho consult for keratitis  - erythromycin ointment R eye q4hrs, ofloxacin ointment R eye QID, artificial tears R eye q2hrs, moisture chamber at bedtime    Dispo: SDU status, DNR, pending PRUCOL for benefits     D/w Dr. D'Amico

## 2025-01-30 NOTE — PROGRESS NOTE ADULT - ASSESSMENT
44M with unclear PMH (?stroke, MI) who was found down at work, intubated for airway protection and found to have acute parenchymal hemorrhage within nabil with mass effect (+ acute/subacute right cerebellar infarct) in setting of hypertensive emergency, transferred to [facility] for further neurosurgical care. Hospital course c/b poor neurologic recovery s/p trach-PEG, AUR s/p gabriel, ANIKA on CKD c/b hyperkalemia, HAP s/p amp-sulbactam (EOT 10/23), K aerogenes bacteremia  treated with 2 weeks of Cefepime per ID , On 11/15 he became septic and was transferred to NSICU due to increased o2 requirements and needed Vent support , Trach Cultures + for Stenotrophomonas which was treated with 7 days of Bactrim per ID , has been weaned of Vent since 11/23 and is now on 10lts at 40% o2 through Trach Collar. Stepped up to the NSICU on 12/15 for hypoxia and tachycardia. PE ruled out. On abx for 5 days. Unclear etiology. Now stepped down to 8Lach.    # Pontine hemorrhage  # Encephalopathy due to Intracranial Bleed   - Acute parenchymal hemorrhage within nabil with mass effect (+ acute/subacute right cerebellar infarct) in setting of hypertensive emergency.   - MRI brain also demonstrated acute/subacute R cerebellar stroke.   - No Neurosurgical Interventions were performed   - Secondary to IPH and cerebellar stroke patient has become Trach and PEG Dependent    #Febrile episode  - follow up cultures  - MRSA nares negative  - started on pseudomonal dosing of zosyn - if continues to be febrile, would reconsult ID  - Switch to PO Levaquin x5d End: 2/3    # Seizures  - Continue Seizure precautions   - Continue Keppra and phenytoin  - appreciate input from epilepsy    # Hypertension  - Continue amlodipine 5mg daily with holding parameters, uptitrate regimen as needed    # Chronic respiratory failure.   - s/p percutaneous trach by pulm on 10/14/24  - Currently on Vent Support   - Continue Chest PT    # Neurogenic dysphagia.   - Pt s/p PEG with surgery 10/21/24  - TF per nutrition  - aspiration precautions and elevation of HOB.    #Acute urinary retention.   - doxazosin 8mg  - continue to monitor urine outpatient     # Functional quadriplegia in setting of brainstem hemorrhage  - decub precautions  - care per nursing protocol

## 2025-01-30 NOTE — PROGRESS NOTE ADULT - ASSESSMENT
44 year-old-male with unclear PMH (?stroke, MI) who was found down at work, intubated for airway protection and found to have acute large parenchymal hemorrhage within nabil with mass effect (+ acute/subacute right cerebellar infarct) in setting of hypertensive emergency, and R cerebellar stroke transferred to [facility] for further neurosurgical care. 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 Epilepsy recalled as pt with vertical eye movements that appeared rhythmic and resolved after given AM Keppra +additional 500mg. Dilantin level 3.3. High suspicion for continued focal seizures, however given location of the bleed, initially suspected that repeating EEG will have limited utility. Unclear if EEG negative for focal seizures or hemifacial spasm is from pontine irritation. Given subtherapeutic level, we recommend increasing Dilantin and monitoring clinically for improvement. On 1/26, pt was repotedly foaming at the mouth, less responsive with increasing in facial twitching (video reviewed by attending, which seemed equivocal to prior). vEEG was restarted Ativan 8mg and Fosphenytoin 1500mg was given by primary team. Epilepsy recommended increasing phenytoin to 150/150/200mg. Level on 1/27 was 18.2 vEEG was negative so was discontinued. On reevaluation 1/30, facial twitching appears improved, localizing only to the L lower mouth and less pronounced than prior (previously in the upper face and lower mouth).     Recommendations:  - Continue Keppra 1500mg Q12hrs  - Continue Phenytoin to 150/150/200mg   - Maintain seizure/fall/aspiration precautions  - Epilepsy will sign off. Please reach out for any new or worsening symptoms.     Discussed with Attending Dr. Ureña.

## 2025-01-30 NOTE — PROGRESS NOTE ADULT - SUBJECTIVE AND OBJECTIVE BOX
O/N Events: GEORGE  Subjective/ROS: No complaints. Denies HA, CP, SOB, n/v, changes in bowel/urinary habits.  12pt ROS otherwise negative.    VITALS  Vital Signs Last 24 Hrs  T(C): 36.3 (30 Jan 2025 08:34), Max: 36.8 (29 Jan 2025 22:33)  T(F): 97.3 (30 Jan 2025 08:34), Max: 98.2 (29 Jan 2025 22:33)  HR: 60 (30 Jan 2025 08:00) (60 - 74)  BP: 126/88 (30 Jan 2025 08:00) (105/72 - 137/88)  BP(mean): 103 (30 Jan 2025 08:00) (85 - 111)  RR: 14 (30 Jan 2025 08:00) (14 - 18)  SpO2: 100% (30 Jan 2025 08:00) (95% - 100%)    Parameters below as of 30 Jan 2025 08:00  Patient On (Oxygen Delivery Method): ventilator    O2 Concentration (%): 40    I&O's Summary    29 Jan 2025 07:01  -  30 Jan 2025 07:00  --------------------------------------------------------  IN: 2520 mL / OUT: 500 mL / NET: 2020 mL    30 Jan 2025 07:01  -  30 Jan 2025 12:38  --------------------------------------------------------  IN: 180 mL / OUT: 100 mL / NET: 80 mL        CAPILLARY BLOOD GLUCOSE      POCT Blood Glucose.: 107 mg/dL (29 Jan 2025 22:26)      PHYSICAL EXAM  General: minimally responsive on ventilator, NAD, no labored breathing, vEEG on   HEENT: trach collar in place, clean  Lungs: anterior exam, limited, no crackles, no wheezes  Heart: regular  Abdomen: soft, no distension, + BS  Extremities:  warm, trace edema, not following commands     MEDICATIONS  (STANDING):  acetylcysteine 10%  Inhalation 4 milliLiter(s) Inhalation every 6 hours  albuterol/ipratropium for Nebulization 3 milliLiter(s) Nebulizer every 6 hours  amLODIPine   Tablet 5 milliGRAM(s) Oral daily  artificial tears (preservative free) Ophthalmic Solution 1 Drop(s) Right EYE every 4 hours  baclofen 5 milliGRAM(s) Oral every 12 hours  chlorhexidine 0.12% Liquid 15 milliLiter(s) Oral Mucosa every 12 hours  chlorhexidine 2% Cloths 1 Application(s) Topical <User Schedule>  doxazosin 8 milliGRAM(s) Oral at bedtime  erythromycin   Ointment 1 Application(s) Right EYE at bedtime  fluticasone propionate 50 MICROgram(s)/spray Nasal Spray 1 Spray(s) Both Nostrils two times a day  heparin   Injectable 5000 Unit(s) SubCutaneous every 8 hours  influenza   Vaccine 0.5 milliLiter(s) IntraMuscular once  levETIRAcetam 1500 milliGRAM(s) Oral two times a day  levoFLOXacin  Tablet 750 milliGRAM(s) Oral every 24 hours  ofloxacin 0.3% Solution 1 Drop(s) Right EYE four times a day  pantoprazole   Suspension 40 milliGRAM(s) Oral daily  petrolatum Ophthalmic Ointment 1 Application(s) Both EYES two times a day  phenytoin   Suspension 150 milliGRAM(s) Oral <User Schedule>  phenytoin   Suspension 200 milliGRAM(s) Oral <User Schedule>  propranolol 5 milliGRAM(s) Oral every 12 hours  sodium chloride 0.65% Nasal 1 Spray(s) Both Nostrils two times a day    MEDICATIONS  (PRN):  acetaminophen   Oral Liquid .. 650 milliGRAM(s) Oral every 6 hours PRN Temp greater or equal to 38C (100.4F), Mild Pain (1 - 3)      Allergy Status Unknown      LABS                        9.8    6.04  )-----------( 225      ( 30 Jan 2025 05:30 )             30.8     01-30    137  |  101  |  35[H]  ----------------------------<  113[H]  3.9   |  26  |  1.14    Ca    9.1      30 Jan 2025 05:30  Phos  4.2     01-30  Mg     2.0     01-30        Urinalysis Basic - ( 30 Jan 2025 05:30 )    Color: x / Appearance: x / SG: x / pH: x  Gluc: 113 mg/dL / Ketone: x  / Bili: x / Urobili: x   Blood: x / Protein: x / Nitrite: x   Leuk Esterase: x / RBC: x / WBC x   Sq Epi: x / Non Sq Epi: x / Bacteria: x            IMAGING/EKG/ETC: reviewed

## 2025-01-31 LAB
ANION GAP SERPL CALC-SCNC: 10 MMOL/L — SIGNIFICANT CHANGE UP (ref 5–17)
BUN SERPL-MCNC: 37 MG/DL — HIGH (ref 7–23)
CALCIUM SERPL-MCNC: 8.8 MG/DL — SIGNIFICANT CHANGE UP (ref 8.4–10.5)
CHLORIDE SERPL-SCNC: 100 MMOL/L — SIGNIFICANT CHANGE UP (ref 96–108)
CO2 SERPL-SCNC: 25 MMOL/L — SIGNIFICANT CHANGE UP (ref 22–31)
CREAT SERPL-MCNC: 1.15 MG/DL — SIGNIFICANT CHANGE UP (ref 0.5–1.3)
EGFR: 79 ML/MIN/1.73M2 — SIGNIFICANT CHANGE UP
EGFR: 79 ML/MIN/1.73M2 — SIGNIFICANT CHANGE UP
GLUCOSE SERPL-MCNC: 95 MG/DL — SIGNIFICANT CHANGE UP (ref 70–99)
HCT VFR BLD CALC: 32.5 % — LOW (ref 39–50)
HGB BLD-MCNC: 10.3 G/DL — LOW (ref 13–17)
MAGNESIUM SERPL-MCNC: 2 MG/DL — SIGNIFICANT CHANGE UP (ref 1.6–2.6)
MCHC RBC-ENTMCNC: 30 PG — SIGNIFICANT CHANGE UP (ref 27–34)
MCHC RBC-ENTMCNC: 31.7 G/DL — LOW (ref 32–36)
MCV RBC AUTO: 94.8 FL — SIGNIFICANT CHANGE UP (ref 80–100)
NRBC # BLD: 0 /100 WBCS — SIGNIFICANT CHANGE UP (ref 0–0)
NRBC BLD-RTO: 0 /100 WBCS — SIGNIFICANT CHANGE UP (ref 0–0)
PHOSPHATE SERPL-MCNC: 4 MG/DL — SIGNIFICANT CHANGE UP (ref 2.5–4.5)
PLATELET # BLD AUTO: 231 K/UL — SIGNIFICANT CHANGE UP (ref 150–400)
POTASSIUM SERPL-MCNC: 4.4 MMOL/L — SIGNIFICANT CHANGE UP (ref 3.5–5.3)
POTASSIUM SERPL-SCNC: 4.4 MMOL/L — SIGNIFICANT CHANGE UP (ref 3.5–5.3)
RBC # BLD: 3.43 M/UL — LOW (ref 4.2–5.8)
RBC # FLD: 13.3 % — SIGNIFICANT CHANGE UP (ref 10.3–14.5)
SODIUM SERPL-SCNC: 135 MMOL/L — SIGNIFICANT CHANGE UP (ref 135–145)
WBC # BLD: 6.04 K/UL — SIGNIFICANT CHANGE UP (ref 3.8–10.5)
WBC # FLD AUTO: 6.04 K/UL — SIGNIFICANT CHANGE UP (ref 3.8–10.5)

## 2025-01-31 PROCEDURE — 99233 SBSQ HOSP IP/OBS HIGH 50: CPT

## 2025-01-31 PROCEDURE — 99232 SBSQ HOSP IP/OBS MODERATE 35: CPT

## 2025-01-31 RX ADMIN — Medication 1 DROP(S): at 05:37

## 2025-01-31 RX ADMIN — Medication 1 SPRAY(S): at 05:31

## 2025-01-31 RX ADMIN — IPRATROPIUM BROMIDE AND ALBUTEROL SULFATE 3 MILLILITER(S): .5; 2.5 SOLUTION RESPIRATORY (INHALATION) at 05:17

## 2025-01-31 RX ADMIN — LEVETIRACETAM 1500 MILLIGRAM(S): 10 INJECTION, SOLUTION INTRAVENOUS at 05:27

## 2025-01-31 RX ADMIN — Medication 1 DROP(S): at 13:18

## 2025-01-31 RX ADMIN — OFLOXACIN 1 DROP(S): 3 SOLUTION OPHTHALMIC at 19:25

## 2025-01-31 RX ADMIN — AMLODIPINE BESYLATE 5 MILLIGRAM(S): 10 TABLET ORAL at 05:30

## 2025-01-31 RX ADMIN — IPRATROPIUM BROMIDE AND ALBUTEROL SULFATE 3 MILLILITER(S): .5; 2.5 SOLUTION RESPIRATORY (INHALATION) at 00:34

## 2025-01-31 RX ADMIN — OFLOXACIN 1 DROP(S): 3 SOLUTION OPHTHALMIC at 13:18

## 2025-01-31 RX ADMIN — OFLOXACIN 1 DROP(S): 3 SOLUTION OPHTHALMIC at 05:17

## 2025-01-31 RX ADMIN — HEPARIN SODIUM 5000 UNIT(S): 1000 INJECTION INTRAVENOUS; SUBCUTANEOUS at 05:32

## 2025-01-31 RX ADMIN — ACETYLCYSTEINE 4 MILLILITER(S): 200 INHALANT RESPIRATORY (INHALATION) at 13:19

## 2025-01-31 RX ADMIN — LEVETIRACETAM 1500 MILLIGRAM(S): 10 INJECTION, SOLUTION INTRAVENOUS at 19:25

## 2025-01-31 RX ADMIN — Medication 150 MILLIGRAM(S): at 13:20

## 2025-01-31 RX ADMIN — ACETYLCYSTEINE 4 MILLILITER(S): 200 INHALANT RESPIRATORY (INHALATION) at 05:19

## 2025-01-31 RX ADMIN — DOXAZOSIN MESYLATE 8 MILLIGRAM(S): 8 TABLET ORAL at 22:05

## 2025-01-31 RX ADMIN — ACETYLCYSTEINE 4 MILLILITER(S): 200 INHALANT RESPIRATORY (INHALATION) at 19:24

## 2025-01-31 RX ADMIN — Medication 1 SPRAY(S): at 19:25

## 2025-01-31 RX ADMIN — Medication 15 MILLILITER(S): at 19:25

## 2025-01-31 RX ADMIN — Medication 15 MILLILITER(S): at 05:32

## 2025-01-31 RX ADMIN — Medication 1 DROP(S): at 10:20

## 2025-01-31 RX ADMIN — BACLOFEN 5 MILLIGRAM(S): 10 INJECTION INTRATHECAL at 05:31

## 2025-01-31 RX ADMIN — HEPARIN SODIUM 5000 UNIT(S): 1000 INJECTION INTRAVENOUS; SUBCUTANEOUS at 13:17

## 2025-01-31 RX ADMIN — Medication 1 DROP(S): at 19:26

## 2025-01-31 RX ADMIN — Medication 200 MILLIGRAM(S): at 19:28

## 2025-01-31 RX ADMIN — Medication 1 APPLICATION(S): at 06:12

## 2025-01-31 RX ADMIN — Medication 40 MILLIGRAM(S): at 13:19

## 2025-01-31 RX ADMIN — IPRATROPIUM BROMIDE AND ALBUTEROL SULFATE 3 MILLILITER(S): .5; 2.5 SOLUTION RESPIRATORY (INHALATION) at 13:18

## 2025-01-31 RX ADMIN — ACETYLCYSTEINE 4 MILLILITER(S): 200 INHALANT RESPIRATORY (INHALATION) at 00:33

## 2025-01-31 RX ADMIN — HEPARIN SODIUM 5000 UNIT(S): 1000 INJECTION INTRAVENOUS; SUBCUTANEOUS at 22:04

## 2025-01-31 RX ADMIN — Medication 1 APPLICATION(S): at 19:26

## 2025-01-31 RX ADMIN — ERYTHROMYCIN 1 APPLICATION(S): 5 OINTMENT OPHTHALMIC at 22:04

## 2025-01-31 RX ADMIN — BACLOFEN 5 MILLIGRAM(S): 10 INJECTION INTRATHECAL at 19:25

## 2025-01-31 RX ADMIN — FLUTICASONE PROPIONATE 1 SPRAY(S): 50 SPRAY, METERED NASAL at 05:31

## 2025-01-31 RX ADMIN — OFLOXACIN 1 DROP(S): 3 SOLUTION OPHTHALMIC at 00:35

## 2025-01-31 RX ADMIN — Medication 150 MILLIGRAM(S): at 03:58

## 2025-01-31 RX ADMIN — Medication 1 DROP(S): at 22:08

## 2025-01-31 RX ADMIN — FLUTICASONE PROPIONATE 1 SPRAY(S): 50 SPRAY, METERED NASAL at 19:26

## 2025-01-31 RX ADMIN — IPRATROPIUM BROMIDE AND ALBUTEROL SULFATE 3 MILLILITER(S): .5; 2.5 SOLUTION RESPIRATORY (INHALATION) at 19:24

## 2025-01-31 RX ADMIN — Medication 1 DROP(S): at 01:01

## 2025-01-31 RX ADMIN — Medication 1 APPLICATION(S): at 05:37

## 2025-01-31 NOTE — PROGRESS NOTE ADULT - ASSESSMENT
44M with unclear PMH (?stroke, MI) who was found down at work, intubated for airway protection and found to have acute parenchymal hemorrhage within nabil with mass effect (+ acute/subacute right cerebellar infarct) in setting of hypertensive emergency, transferred to [facility] for further neurosurgical care. Hospital course c/b poor neurologic recovery s/p trach-PEG, AUR s/p gabriel, ANIKA on CKD c/b hyperkalemia, HAP s/p amp-sulbactam (EOT 10/23), K aerogenes bacteremia  treated with 2 weeks of Cefepime per ID , On 11/15 he became septic and was transferred to NSICU due to increased o2 requirements and needed Vent support , Trach Cultures + for Stenotrophomonas which was treated with 7 days of Bactrim per ID , has been weaned of Vent since 11/23 and is now on 10lts at 40% o2 through Trach Collar. Stepped up to the NSICU on 12/15 for hypoxia and tachycardia. PE ruled out. On abx for 5 days. Unclear etiology. Now stepped down to 8Lach.    # Pontine hemorrhage  # Encephalopathy due to Intracranial Bleed   - Acute parenchymal hemorrhage within nabil with mass effect (+ acute/subacute right cerebellar infarct) in setting of hypertensive emergency.   - MRI brain also demonstrated acute/subacute R cerebellar stroke.   - No Neurosurgical Interventions were performed   - Secondary to IPH and cerebellar stroke patient has become Trach and PEG Dependent    #Febrile episode  - follow up cultures  - MRSA nares negative  - started on pseudomonal dosing of zosyn - if continues to be febrile, would reconsult ID  - Switch to PO Levaquin x5d End: 2/3    # Seizures  - Continue Seizure precautions   - Continue Keppra and phenytoin  - appreciate input from epilepsy    # Hypertension  - Continue amlodipine 5mg daily with holding parameters, uptitrate regimen as needed    # Chronic respiratory failure.   - s/p percutaneous trach by pulm on 10/14/24  - Currently on Vent Support   - Continue Chest PT    # Neurogenic dysphagia.   - Pt s/p PEG with surgery 10/21/24  - TF per nutrition  - aspiration precautions and elevation of HOB.    #Acute urinary retention.   - doxazosin 8mg  - continue to monitor urine outpatient     # Functional quadriplegia in setting of brainstem hemorrhage  - decub precautions  - care per nursing protocol

## 2025-01-31 NOTE — PROGRESS NOTE ADULT - SUBJECTIVE AND OBJECTIVE BOX
HPI:  Unknown age male, unknown past medical history (reported stroke and MI by coworkers) presented to [facility] with AMS, Pt was working at [employer] when he was found down by coworkers. EMS called and pt brought to [facility] ED. Intubated, sedated, started on cardene for SBPs in 200s. CT head showed brain stem bleed. Transferred to NSICU for further management.  (30 Sep 2024 12:55)    INTERVAL EVENTS:  No acute overnight events.     HOSPITAL COURSE:  9/30: transferred from [facility]. A line placed. Versed dc'd. Cy Rader at bedside, states pt has family in [place], cannot confirm medications or PMH other than stroke and MI. 250cc bolus 3% given. LR switched to NS. hydralazine 25q8 started, 3% started, switched propofol to precedex   10/1: stability CTH done. Added labetalol, started TF. Palliative consulted. ethics consulted to determine surrogate. febrile 103, pan cx sent  10/2: BD 2, GEORGE overnight. TF resumed. Desatt'd to 80s, FiO2 inc. to 50. Fentanyl given, ABG, CXR ordered. Maxxed on precedex, started on propofol for DARIEN -4 - -5. Precedex dc'd. Duonebs, mucomyst, hypertonic added. 3% dc'd. Cardene dc'd. Start vanc/CTX. Increased labetalol 200q8. MRSA negative, dc'd vanc. ETT pulled back 2cm x 2, good positioning after confirmatory chest xray. Ethics attempting to establish HCP with family. Na 159, starting FW 250q6 for range 150-155.   10/3: BD3, GEORGE o/n, neuro stable. Na elevating, FW increased to 300q6. Dc'd bowel reg for diarrhea. vEEG started. SQH 5000q8 tonight.   10/4: BD 4, albumn bolus, incr. LR to 80 2/2 incr. in Cr, LR to 10 0cc/hr for uptrending Cr. Started 7% hypersal for 48hrs and SL atropine for inline/oral thick secretions. Dc'd CTX and started ancef for MSSA in the sputum. Nephrology consulted for CKD, f/u recs. SBP 170s, given hydralazine 10mg IVP.   10/5: BD5, o/n 10mg IVP hydralazine given for SBP 170s and started on hydralazine 25q8 via OGT. 10mg IV push labetalol for SBP > 160s. RT placed for diarrhea.   10/6: BD6, o/n FW increased to 350q4 per nephrology recs. IV tylenol for temp 100.6, SBp 160s presumed uncomfortable.   10/7: BD7, overnight pancultured for temp 101.8F.   10/8: BD8. GEORGE. Cr bumped. decreased LR to 75cc/hr. Adding simethicone ATC. incr hydralazine 50mgTID. Incr labetalol 300mgTID. Na 145, decreased FWF to 250q6. Start precedex. FENa consistent with intrinsic kidney injury. Pend repeat renal US. Retaining up to 1.3L, bladder scans q6, straight cath PRN  10/9: BD 9. GEORGE overnight. Neuro stable. abd xray for distention w non-specific gas pattern, OGT to LIWS for morning. duonebs/mucomyst to q8 for improving secretions. Changed tube feeds to Jevity 1.5 20cc/hr, low rate due to abdominal distention, nepro dense and more difficult to digest. Tolerating CPAP, confirmed by ABG.   10/10: BD 10. GEORGE overnight. Neuro stable. (+) gabriel for urinary retention on bladder scan. inc TF to goal rate of 40cc/hr. family leaning toward pursuing trach/PEG. 1/2 amp for FS 81.   10/11: BD 11. GEORGE overnight. Neuro stable. Trach/PEG consults placed.   10/12: BD 12. GEORGE overnight. Neuro stable. MRI brain complete.   10/13: BD 13. Increase flomax. Hold SQH after PM dose for trach tm. IVL.   10/14: BD 14. GEORGE overnight, remains on AC/VC. Gabriel placed for urinary retention. Dc'd free water.  S/p trach with pulm. NGT placed and CXR confirmed in good position.   10/15: BD 15, GEORGE ovn. resumed feeds. spiked 101, pan cx sent.   10/16: BD 16. GEORGE ovn. Lokelma 5mg for K+ 5.4. Started vanc q 24/zosyn for empiric PNA coverage, IVF to 100/hr. PEG held for fever.   10/17: BD 17,  ordered serum osm and urine osm for am. Started sinemet for neurostimulation. Increased cardura to 0.8. Started FW 100q4, dc'd IVF. MRSA negative, dc'd vanc. NGT replaced d/t coiling.   10/18: BD 18, GEORGE overnight, neuro stable. Amantadine added for neurostim. zosyn changed to unasyn for acinetobacter baumannii, failed TOV and required SC  10/19: BD 19, GEORGE ovn. cardura 2mg added for retention. labetalol decreased 200q8, hydralazine decreased 25q8. Gabriel replaced.   10/20: BD20, GEORGE overnight. NGT dislodged, replaced. PEG tomorrow w/ gen surg, FW increased to 150q4 and labetalol decreased to 100q8, lokelma given for hyperkalemia.   10/21: BD 21. POD0 PEG placement with Gen surg. decr labetolol to 50q8, incr. cardura to 0.4, started lokelma and phoslo, dc gabriel POD0 PEG placement with Gen surg.  10/22: BD 22. Plan to start TF today via PEG. dc labetalol, Following ophtho recs. Increased apnea settings - found to be in cheyne-moe respiration. CPAP 5/5.  10/23: BD 23. hydralazine d/c'd, trach collar trial today. Rectal tube placed at 6am.  10/24: BD24, o/n lokelma held due to diarrhea. Free water 100q6 resumed. dc'd tamsulosin, amantadine. Incr'd cardura to 8mg qhs. Dc'd FW. Switched jevity to nepro. gabriel placed for high urine output. Started SL atropine for oral secretions. Dc'd free water.  10/25: BD25, o/n decreased suctioning requirements to > q4hrs, GEORGE. Cr improving, cont phoslo, lokelma held at this time. Gabriel placed yest, cont. Tolerating trach collar. Given 500cc plasmalyte bolus for ANIKA. Dc'd sinemet.   10/26: BD26, o/n resumed lokelma 5mg daily and resumed 100cc free water q6hrs. Change in neuro status with new right pupillary dilation with anisocoria (right pupil 6mm fixed and left pupil 3mm briskly reactive). Given 23.4% NaCl bullet, taken for emergent CTH showing mostly resolved pontine hemorrhage, continued brainstem hypodensity likely edema d/t hemorrhage, no new hemorrhage or infarct, no herniation, mild increase in size of left lateral ventricle. Vitals remaine stable. Na goal > 140.   10/27: BD27, o/n GEORGE.Neuro stable. Pend stepdown with airway bed.   10/28: BD 28. GEORGE overnight. Neuro stable. Miralax ordered. Gabriel removed, pending TOV.  10/29: BD 29. GEORGE o/n. Given 2L NS over 8 hrs for increased BUN/Cr ratio. Gabriel placed for frequent straight cath.   10/30: BD 30.   10/31: BD 31. GEORGE overnight. Na 149, increased free water to 200q6. 1L NS for uptrending BUN.   11/1: BD 32. GEORGE overnight. Given 1L NS for dehydration. Na 146, increased FW to 250q6.   11/2: BD 33 GEORGE overnight, neuro stable, given 500cc bolus for net negative status and tachycardia   11/3: BD 34, GEORGE overnight, neuro stable. Patient remains tachycardic, EKG showing sinus tachycardia, given additional 500cc NS bolus. Febrile to 101.9F, pan cultured (without UA), CXR WNL, given tylenol.   11/4: BD 35, GEORGE overnight, neuro stable. Given 1L NS for tachycardia. sputum (+) for stenotropohomas maltophilia.   11/5: BD 36 GEORGE overnight, neuro stable. Vancomycin dc'd. Chest PT BID. ID consulted, cont zosyn.  11/6: BD 37. blood cx + klebsiella dc zosyn changed to cefepime, CTAP ordered, rpt blood cx sent.    11/7: BD 38. Pending CT A/P, given 250cc bolus and starting maintenance fluids overnight. Pending CT A/P after bolus   11/8: BD 39. CT CAP negative for infection.   11/9: BD 40. GEORGE overnight.  11/10: BD 41. GEORGE overnight. desat to 85 on trach collar, O2 inc to 10L and 100%, O2 sat inc to 95. pt tachy to 110s, euvolemic. given tylenol. ABG and CXR ordered. spiked fever, pancultured, RVP negative. AM ABG w pO2 79, rpt w pO2 79. pt appears comfortable, satting 94%.   11/11: BD 42. GEORGE overnight. pt became tachy to 130s, desat to 90 on 100% FiO2 and 10L. suctioned, (+) productive cough. temp 101.4, given 1g IV tylenol and 500cc NS bolus for euvolemia. fever and HR downtrending. LE dopplers negative for dvt  11/12: BD 43, GEORGE ovn, fever and HR downtrending, satting 97% 70% FIO2  11/13: BD 44, GEORGE ovn. started standing tylenol x24 hours for tachycardia. desat to 80s, o2 increased. CXR stable, pending CTA PE protocol.   11/14: BD 45, GEORGE overnight, neuro stable. resp therapy dec FiO2 to 70%.   11/15: BD 46, GEORGE overnight, neuro stable.  Rapid called for desaturation 30s, tachycardic 140s. Patient bagged, 100% fio2, heavily suctioned. CXR/POCUS unremarkable. ABG c/w desaturation. WBC 14.71. Afebrile. O2 improved to 90s and patient upgraded to ICU. ABG paO2 30s improved to 89 on vent. IV Tylenol x 1, sputum sent. Start protonix while o-n vent.   11/16: BD 47. POCUS showed collapsable IVCF, given 1L bolus. Vanco/Cefpime added empriic for PNA, NGT feeds restarted. MRSA swab neg, Vanc DC'd.   11/17: BD 48. GEORGE overnight. 1l bolus for tachycardia. Spiked to 101, cultured. 500cc bolus for tachycardia, tachy to 148 given 25mcg fentanyl, 250cc albumin, 1.5L bolus. 5 IV lopressor with response HR to 100s. +Stenotrophomonas on sputum cx.   11/18: BD 49. GEORGE overnight. Consulted ID, cefepime switched to bactrim x7days. Started hydrochlorothiazine 12.5mg daily.  11/19: BD 50. Tachy 120s, given tylenol and 500cc NS. Tolerating 5/5, switched to TCx. Holding phos binder. D/c Bactrim. D/c gabriel, f/u TOV. Dc'd PPI.   11/20: BD 51. GEORGE ovn. 1600 satting low 90s, mildly tachy to 110s, afebrile, RR wnl. O2 improved to mid 90s while inc O2 to 100% on TCx. CPAP 5/5 placed back on.  11/21: BD 52, GEORGE ovn, tolerating CPAP 5/5. Switch to trach collar during the day if tolerating well. HCTZ held for Cr bump, straight cath frequence increased to q4  11/22: BD 53, GEORGE ovn. Resumed phoslo. Gabriel placed. Resumed HCTZ.   11/23: BD 54. Holding tylenol in setting of possible fever, will require pan cx if febrile. Cr improved today. Cont CPAP. Bowel regimen held i/s/o diarrhea. FOBT negative.  11/24: BD 55. GEORGE overnight. Neuro stable. HCTZ dc'ed, started lisinopril 5. Lokelma dc'ed for K 3.7.   11/25: BD 56, GEORGE overnight. Neuro stable. dc'd lisinopril 5mg. Gabriel dc'd. TOV. 1545 noted to be hypotensive, MAP 50, in supine position on chair, HR 60s, afebrile, O2 96%. Given 1L cc NS bolus, placed back on bed in reverse trendelenberg, improved to map of 66. Neostick at bedside. Vitals check q1h. Dc'ed amlodipine. Failed TOV, bladder scan q6, sc prn. Added back Senna.   11/26: BD 57, GEORGE overnight. Neuro stable. Dc'd phoslo.   11/27: BD 58, GEORGE overnight. Neuro stable.    11/28: BD 59. Gabriel replaced.   11/29: GEORGE.  11/30: GEORGE, neuro stable.   12/1: BD 62, GEORGE overnight  12/2: BD 63, GEORGE overnight.; Given 1L bolus of LR for uptrending BUN/Cr.  12/3: BD 64. Reinstated eye gtt/moisture chamber given increased Rt eye injection  12/4: BD 65. GEORGE overnight. Attempted to speak with ophtho regarding eyelid weight/closure but no answer, full mailbox.   12/5: BD 66, GEORGE overnight, bowel regimen increased and had BM.   12/6: BD 67, GEORGE overnight, neuro stable.  12/7: GEORGE overnight, neuro stable. /110s, given x1 hydralazine 10 mg IVP. Restarted home amlodipine 5mg.  12/8: GEORGE. OOB to chair.     12/11: GEORGE, mucomyst added for thick secretions, simethicone for abd distension, abd xray with stool burden, increased bowel regimen.   12/12: GEORGE overnight.   12/13: GEORGE overnight.   12/14: GEORGE overnight  12/15: o/n Patient became tachycardic HR 120s and 10 minutes later O2 sat dropped as low as 89%. Patient suctioned without improvement in O2 sat and tube feeds found in suction catheter. TFs held.  STAT CXR ordered. STAT labs sent. Respiratory therapist called to bedside and patient trach connected to ventilator. After connection to ventilator and further suctioning O2 sat improved to 97% but patient HR remains 120-130s. Upgraded to NSICU for further management. Vancomycin and zosyn started. CTA  chest PE protocol and CTH ordered. Blood cultures sent.given 500cc bolus, rpt ABG sent pO2 243, CTH and CTA chest done. FS while NPO, FiO2 dec 50 pending ABG. sputum cx positive for few GPC and GNR.   12/16: GEORGE overnight, restarted amlodipine, troponin 75   12/17: GEORGE overnight, neuro stable. Attempted CPAP this morning, did not tolerate and back on full vent support. Dc'd Vanc. Tachycardia to 140s, noted extremities to be twitching along with jaw twitching. Given 2g of Keppra, total 4mg ativan and placed on EEG, full set of labs and lactate negative. Resumed trickle feeds at 20cc/hr. Given 250cc albumin for tachycardia.   12/18: GEORGE overnight. Neuro stable. CTH stable. EEG negative and dc'd.   12/19: GEORGE overnight. neuro stable. SIMV most of day, AC/VC at night.   12/20: GEORGE overnight, neuro stable. remains on VC/AC  12/21: GEORGE ovn. 1L bolus for tachy to 120s-130s.   12/22: GEORGE ovn. Tachy to 120s, given tylenol and IVF. 2mg IV ativan given for L jaw twitching. Sx resolved for 3 minutes and started again. Epilepsy contacted. Given 2mg IV ativan. Continued twitching. Increased keppra to 1500mg BID, 2mg ativan given. Propranolol started for refractory tachycardia. vEEG ordered. albumin given. POCUS performed, no b lines. Started on fosphenytoin, loaded w 20mg/kg then 100mg TID. febrile, pancx, started cefepime 2g q8, vancomycin  12/23: GEORGE ovn. +focal motor seizures on eeg. ID consulted. Vancomycin dc'ed as per ID.  12/24: GEORGE ovn, neuro stable. Baclofen 5 mg q12 started for hiccups. Na 129 from 134. Urine lytes c/w SIADH. Given 250cc 3% bolus. CT chest for infection w/u - f/u read. Repeat Na 136. UA negative  12/25: GEORGE ovn.   12/26: GEORGE ovn  12/27: GEORGE overnight. Blood cx neg @ 4 days, DC cefepime per medicine (sputum colonized).   12/28: Desaturation o/n to 80's, CXR obtained, pulse ox changed and sats resolved. Na 133 from 138, 250cc 3% bolus given. Cefepime resumed until 12/30 per ID. Rpt Na 138.  12/29: GEORGE overnight. Na stable.   12/30: GEORGE overnight. Family meeting with son, pt now DNR.   12/31: GEORGE overnight.   1/1: GEORGE overnight, neuro stable.  1/2: GEORGE overnight, neuro stable. FW decreased to 100q6.   1/3: GEORGE overnight, neuro stable. fosphenytoin IV changed to pheytoin PO via PEG per epilepsy recommendations  1/4: GEORGE overnight, neuro stable. FW incr. to 100q4  1/5: GEORGE overnight, neuro stable.   1/6: GEORGE overnight, neuro stable   1/7: GEORGE overnight, neuro stable. BUN/Cr increased, increased FW to 200q6. Given 1L bolus of LR over 2 hours. Gabriel d/c'd, voiding, continue bladder scan q6.   1/8: GEORGE overnight, neuro stable. BUN/Cr improving.  1/9: GEORGE overnight, neuro stable.   1/10: GEORGE overnight, neuro stable.   1/11: GEORGE overnight, neuro stable, vent settings stable.   1/12: GEORGE overnight, neuro stable. Febrile to 102F, f/u pan cx, chest xray, given tylenol. Zosyn started.   1/13: GEORGE overnight, neuro stable, cont Zosyn for presumed PNA, prelim sputum cx growing; few GS neg diplococci, mod GS neg rods, rare GS + rods, fever trend improved. Free water increased to [id number].   1/14: GEORGE overnight. pending renal US for elevated Cr  1/15: GEORGE ovn. renal US complete.   1/16: GEORGE overnight, neuro stable   1/17: GEORGE overnight, neuro stable   1/18: GEORGE overnight, neuro stable.   1/19: GEORGE overnight, neuro stable. Propranolol dec to 5q8.   1/20: GEORGE overnight, neuro stable. Weaned propranolol to 5mg BID.   1/21: GEORGE ovn, in AM having rapid vertical eye movements with facial twitching, 2g total keppra given for AM dose and phenytoin level ordered, vital signs stable. CTH stable. Phenytoin increased to 100/100/150mg per epilepsy  1/22: GEORGE ovn. IV hydral x 1 for SBP 170s.   1/23: Cont to have focal motor seizures. Per epilepsy, changed phenytoin dose to 100/100/200.   1/24: Phenytoin lvl tmrw AM. Chest Xray completed due to temp 100.2F  1/25: GEORGE overnight. Incr dilantin morning and afternoon per epilepsy.   1/26: GEORGE overnight. Sustained focal twitching noticed by nursing. Ativan 8mg in total given. Fosphenytoin 1500mg IV given. Placed on EEG. Epilepsy following. TFs held. Phenytoin increased to 150/150/200.   1/27: o/n CTH completed due to continued lethargy after ativan given yesterday afternoon. TFs resumed. 500cc bolus NS given for SBP 90s. EEG dc'ed, discussed w/ Dr. Tomlin. Febrile 101F, pan cx sent. Likely left sided infiltrate on CXR, c/f aspiration PNA. Started on vanc/zosyn. MRSA swab pending.   1/28: Neuro stable, on vanc/zosyn. +UTI on UA, MRSA negative. Vanc dc'd. RVP negative. Zosyn increased to pseudomonal dosing.   1/29: GEORGE overnight, neuro stable.  1/30: GEORGE overnight, neuro stable. Dc'd zosyn due to resistance, switched to Levaquin.  1/31: GEORGE ovenight, neuro stable.     Vital Signs Last 24 Hrs  T(C): 36.9 (30 Jan 2025 21:38), Max: 36.9 (30 Jan 2025 21:38)  T(F): 98.4 (30 Jan 2025 21:38), Max: 98.4 (30 Jan 2025 21:38)  HR: 70 (30 Jan 2025 20:55) (60 - 72)  BP: 123/91 (30 Jan 2025 20:25) (105/72 - 134/96)  BP(mean): 103 (30 Jan 2025 20:25) (85 - 111)  RR: 14 (30 Jan 2025 20:55) (14 - 18)  SpO2: 99% (30 Jan 2025 20:55) (95% - 100%)  Patient On (Oxygen Delivery Method): ventilator  O2 Concentration (%): 40    I&O's Summary  29 Jan 2025 07:01  -  30 Jan 2025 07:00  --------------------------------------------------------  IN: 2520 mL / OUT: 500 mL / NET: 2020 mL    30 Jan 2025 07:01  -  31 Jan 2025 00:16  --------------------------------------------------------  IN: 1940 mL / OUT: 325 mL / NET: 1615 mL    PHYSICAL EXAM:  General: patient seen laying supine in bed in NAD  Neuro: opens eyes to voice, does not follow commands, RUE trace squeeze, b/l lower extremities withdraw,  LUE 0/5, mild mouth and R eye twitching  HEENT: PERRL, +trach to vent  Neck: supple  Cardiac: RRR, S1S2  Pulmonary: chest rise symmetric  Abdomen: soft, nontender, nondistended, +PEG  Ext: perfusing well  Skin: warm, dry    TUBES/LINES:  [] Gabriel  [X] Trach  [] NGT  [X] PEG  [] Wound Drains  [] Others    DIET:  [] NPO  [] Mechanical  [X] Tube feeds    LABS:             9.8    6.04  )-----------( 225      ( 30 Jan 2025 05:30 )             30.8     137  |  101  |  35[H]  ----------------------------<  113[H]  3.9   |  26  |  1.14    Ca    9.1      30 Jan 2025 05:30  Phos  4.2     01-30  Mg     2.0     01-30    Urinalysis Basic - ( 30 Jan 2025 05:30 )  Color: x / Apperance: x / SG: x / pH: x  Gluc: 113 mg/dL / Ketone: x  / Bili: x / Urobili: x   Blood: x / Protein: x / Nitrite: x   Leuk Esterase: x / RBC: x / WBC x   Sq Epi: x / Non Sq Epi: x / Bacteria: x    Drug Levels:   Phenytoin Level, Serum: 16.9 ug/mL (01-28 @ 10:41)  Phenytoin Level, Serum: 18.2 ug/mL (01-27 @ 05:30)  Phenytoin Level, Serum: 3.5 ug/mL (01-26 @ 05:30)    Allergies  Allergy Status Unknown    MEDICATIONS:  Antibiotics:  levoFLOXacin  Tablet 750 milliGRAM(s) Oral every 24 hours  Neuro:  acetaminophen   Oral Liquid .. 650 milliGRAM(s) Oral every 6 hours PRN  baclofen 5 milliGRAM(s) Oral every 12 hours  levETIRAcetam 1500 milliGRAM(s) Oral two times a day  phenytoin   Suspension 150 milliGRAM(s) Oral <User Schedule>  phenytoin   Suspension 200 milliGRAM(s) Oral <User Schedule>  Anticoagulation:  heparin   Injectable 5000 Unit(s) SubCutaneous every 8 hours  OTHER:  acetylcysteine 10%  Inhalation 4 milliLiter(s) Inhalation every 6 hours  albuterol/ipratropium for Nebulization 3 milliLiter(s) Nebulizer every 6 hours  amLODIPine   Tablet 5 milliGRAM(s) Oral daily  artificial tears (preservative free) Ophthalmic Solution 1 Drop(s) Right EYE every 4 hours  chlorhexidine 0.12% Liquid 15 milliLiter(s) Oral Mucosa every 12 hours  chlorhexidine 2% Cloths 1 Application(s) Topical <User Schedule>  doxazosin 8 milliGRAM(s) Oral at bedtime  erythromycin   Ointment 1 Application(s) Right EYE at bedtime  fluticasone propionate 50 MICROgram(s)/spray Nasal Spray 1 Spray(s) Both Nostrils two times a day  influenza   Vaccine 0.5 milliLiter(s) IntraMuscular once  ofloxacin 0.3% Solution 1 Drop(s) Right EYE four times a day  pantoprazole   Suspension 40 milliGRAM(s) Oral daily  petrolatum Ophthalmic Ointment 1 Application(s) Both EYES two times a day  propranolol 5 milliGRAM(s) Oral every 12 hours  sodium chloride 0.65% Nasal 1 Spray(s) Both Nostrils two times a day    CULTURES:  Culture Results:   >100,000 CFU/ml Klebsiella aerogenes (Previously Enterobacter) (01-27 @ 23:20)  Culture Results:   No growth at 48 Hours (01-27 @ 22:35)    ASSESSMENT:  44M PMH ?stroke/MI present to [facility] after collapsing at work. Decorticate posturing, vomiting, intubated for airway protection. 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 Transferred to [facility] for further management. s/p trach 10/14. s/p peg 10/21. Re-upgrade to ICU 2/2 desaturation event and suctioning requirements 11/15. Re-upgrade to NSICU 12/15 2/2 desaturation and tachycardia.    Neuro:  - neuro/vitals q4h  - pain control: tylenol prn  - seizure tx: keppra 1500mg BID, phenytoin 150/150/200  - s/p vEEG (1/26-1/27)vEEG (10/3-4) negative, (10/17-10/19) negative, (12/17-12/18) negative, (12/22-12/25), (1/26-1/28),  +focal motor seizures not correlating w/ EEG,  - CTH 9/30: enlarged pontine hemorrhage, CTH 10/3: stable, CTH 10/25: mostly resolved pontine hemorrhage, CTH 12/15: R mastoid air cell opacification; acute otitis media vs sterile effusion, CTH 12/18: stable. CTH 1/21 stable, CTH 1/27 stable  - MRI brain 10/12: parenchymal hemorrhage, acute/subacute R cerebellar stroke      CV:  - -160  - tachycardia: propranolol 5mg BID   - HTN: amlodipine 5mg  - echo (9/30) EF 75%, repeat 12/16: 57%    Resp:  - trach to vent, AC/VC 40/400/14/6  - Secretions: duonebs/mucomyst/chest PT q6h     GI:  - TFs via PEG  - bowel regimen held for loose stool, last BM 1/29  - baclofen 5q12 for hiccups    Renal:  - FW 200cc q4h for hydration  - Urinary retenion: Gabriel removed 1/7, SC prn  - CKD: trend BUN/Cr  - renal US 10/1, 10/8, 1/15: Increased bilateral renal echogenicity consistent with medical renal disease    Endo:  - A1c 5.4    Heme:  - DVT ppx: SCDs, SQH 5000u q8h   - LE dopplers negative 12/16    ID:  - last pan cx 1/27, +klebsiella UTI, ?left infiltrate   - Levaquin (1/30-2/4), zosyn dc'd due to resistance, vanc dc'd, MRSA negative 1/28  - empiric PNA: cefepime (12/22-12/30), s/p vanc (12/22-12/23), s/p zosyn (12/15-12/20), s/p vanc (12/15-12/16), s/p zosyn (1/12 -1/18)  - s/p Stenotrophomonas maltophilia PNA: s/p Bactrim (11/18-11/19) s/p Cefepime (11/16-11/18)  - 11/3, (+) sputum for stenotrophomas maltophlia, blood cx (+) klebsiella, cefepime 2gq12 (11/6 - 11/12)   - empiric tx: s/p zosyn (11/3-11/6) , s/p vanc  (11/3-11/5)  - S/p Ancef (10/4-10/14) for PNA, and s/p Unasyn (10/18-10/23) +actinobacter baumanii     MISC:  - ophtho consult for keratitis  - erythromycin ointment R eye q4hrs, ofloxacin ointment R eye QID, artificial tears R eye q2hrs, moisture chamber at bedtime    Dispo: SDU status, DNR, pending PRUCOL for benefits     D/w Dr. D'Amico

## 2025-01-31 NOTE — PROGRESS NOTE ADULT - SUBJECTIVE AND OBJECTIVE BOX
O/N Events: GEORGE  Subjective/ROS: Denies HA, CP, SOB, n/v, changes in bowel/urinary habits.  12pt ROS otherwise negative.    VITALS  Vital Signs Last 24 Hrs  T(C): 36.7 (31 Jan 2025 09:01), Max: 36.9 (30 Jan 2025 21:38)  T(F): 98 (31 Jan 2025 09:01), Max: 98.4 (30 Jan 2025 21:38)  HR: 68 (31 Jan 2025 08:45) (62 - 76)  BP: 115/81 (31 Jan 2025 08:45) (107/71 - 128/91)  BP(mean): 93 (31 Jan 2025 08:45) (85 - 106)  RR: 18 (31 Jan 2025 08:45) (14 - 18)  SpO2: 100% (31 Jan 2025 08:45) (97% - 100%)    Parameters below as of 31 Jan 2025 08:45  Patient On (Oxygen Delivery Method): ventilator    O2 Concentration (%): 40    I&O's Summary    30 Jan 2025 07:01  -  31 Jan 2025 07:00  --------------------------------------------------------  IN: 2580 mL / OUT: 1120 mL / NET: 1460 mL    31 Jan 2025 07:01  -  31 Jan 2025 11:18  --------------------------------------------------------  IN: 320 mL / OUT: 350 mL / NET: -30 mL    CAPILLARY BLOOD GLUCOSE    PHYSICAL EXAM  General: minimally responsive on ventilator, NAD, no labored breathing, vEEG on   HEENT: trach collar in place, clean  Lungs: anterior exam, limited, no crackles, no wheezes  Heart: regular  Abdomen: soft, no distension, + BS  Extremities:  warm, trace edema, not following commands     MEDICATIONS  (STANDING):  acetylcysteine 10%  Inhalation 4 milliLiter(s) Inhalation every 6 hours  albuterol/ipratropium for Nebulization 3 milliLiter(s) Nebulizer every 6 hours  amLODIPine   Tablet 5 milliGRAM(s) Oral daily  artificial tears (preservative free) Ophthalmic Solution 1 Drop(s) Right EYE every 4 hours  baclofen 5 milliGRAM(s) Oral every 12 hours  chlorhexidine 0.12% Liquid 15 milliLiter(s) Oral Mucosa every 12 hours  chlorhexidine 2% Cloths 1 Application(s) Topical <User Schedule>  doxazosin 8 milliGRAM(s) Oral at bedtime  erythromycin   Ointment 1 Application(s) Right EYE at bedtime  fluticasone propionate 50 MICROgram(s)/spray Nasal Spray 1 Spray(s) Both Nostrils two times a day  heparin   Injectable 5000 Unit(s) SubCutaneous every 8 hours  influenza   Vaccine 0.5 milliLiter(s) IntraMuscular once  levETIRAcetam 1500 milliGRAM(s) Oral two times a day  levoFLOXacin  Tablet 750 milliGRAM(s) Oral every 24 hours  ofloxacin 0.3% Solution 1 Drop(s) Right EYE four times a day  pantoprazole   Suspension 40 milliGRAM(s) Oral daily  petrolatum Ophthalmic Ointment 1 Application(s) Both EYES two times a day  phenytoin   Suspension 150 milliGRAM(s) Oral <User Schedule>  phenytoin   Suspension 200 milliGRAM(s) Oral <User Schedule>  propranolol 5 milliGRAM(s) Oral every 12 hours  sodium chloride 0.65% Nasal 1 Spray(s) Both Nostrils two times a day    MEDICATIONS  (PRN):  acetaminophen   Oral Liquid .. 650 milliGRAM(s) Oral every 6 hours PRN Temp greater or equal to 38C (100.4F), Mild Pain (1 - 3)      Allergy Status Unknown      LABS                        10.3   6.04  )-----------( 231      ( 31 Jan 2025 06:06 )             32.5     01-31    135  |  100  |  37[H]  ----------------------------<  95  4.4   |  25  |  1.15    Ca    8.8      31 Jan 2025 06:06  Phos  4.0     01-31  Mg     2.0     01-31        Urinalysis Basic - ( 31 Jan 2025 06:06 )    Color: x / Appearance: x / SG: x / pH: x  Gluc: 95 mg/dL / Ketone: x  / Bili: x / Urobili: x   Blood: x / Protein: x / Nitrite: x   Leuk Esterase: x / RBC: x / WBC x   Sq Epi: x / Non Sq Epi: x / Bacteria: x    IMAGING/EKG/ETC: reviewed

## 2025-02-01 LAB
BASE EXCESS BLDA CALC-SCNC: 3 MMOL/L — SIGNIFICANT CHANGE UP (ref -2–3)
CO2 BLDA-SCNC: 28 MMOL/L — HIGH (ref 19–24)
GAS PNL BLDA: SIGNIFICANT CHANGE UP
HCO3 BLDA-SCNC: 27 MMOL/L — SIGNIFICANT CHANGE UP (ref 21–28)
PCO2 BLDA: 39 MMHG — SIGNIFICANT CHANGE UP (ref 35–48)
PH BLDA: 7.45 — SIGNIFICANT CHANGE UP (ref 7.35–7.45)
PO2 BLDA: 89 MMHG — SIGNIFICANT CHANGE UP (ref 83–108)
SAO2 % BLDA: 99 % — HIGH (ref 94–98)

## 2025-02-01 PROCEDURE — 71045 X-RAY EXAM CHEST 1 VIEW: CPT | Mod: 26

## 2025-02-01 PROCEDURE — 99233 SBSQ HOSP IP/OBS HIGH 50: CPT

## 2025-02-01 PROCEDURE — 99232 SBSQ HOSP IP/OBS MODERATE 35: CPT

## 2025-02-01 RX ORDER — LORAZEPAM 4 MG/ML
2 VIAL (ML) INJECTION ONCE
Refills: 0 | Status: DISCONTINUED | OUTPATIENT
Start: 2025-02-01 | End: 2025-03-13

## 2025-02-01 RX ORDER — LORAZEPAM 4 MG/ML
2 VIAL (ML) INJECTION ONCE
Refills: 0 | Status: DISCONTINUED | OUTPATIENT
Start: 2025-02-01 | End: 2025-02-01

## 2025-02-01 RX ADMIN — AMLODIPINE BESYLATE 5 MILLIGRAM(S): 10 TABLET ORAL at 06:13

## 2025-02-01 RX ADMIN — IPRATROPIUM BROMIDE AND ALBUTEROL SULFATE 3 MILLILITER(S): .5; 2.5 SOLUTION RESPIRATORY (INHALATION) at 11:23

## 2025-02-01 RX ADMIN — ACETYLCYSTEINE 4 MILLILITER(S): 200 INHALANT RESPIRATORY (INHALATION) at 08:17

## 2025-02-01 RX ADMIN — ERYTHROMYCIN 1 APPLICATION(S): 5 OINTMENT OPHTHALMIC at 21:27

## 2025-02-01 RX ADMIN — IPRATROPIUM BROMIDE AND ALBUTEROL SULFATE 3 MILLILITER(S): .5; 2.5 SOLUTION RESPIRATORY (INHALATION) at 08:18

## 2025-02-01 RX ADMIN — HEPARIN SODIUM 5000 UNIT(S): 1000 INJECTION INTRAVENOUS; SUBCUTANEOUS at 14:13

## 2025-02-01 RX ADMIN — Medication 40 MILLIGRAM(S): at 11:24

## 2025-02-01 RX ADMIN — IPRATROPIUM BROMIDE AND ALBUTEROL SULFATE 3 MILLILITER(S): .5; 2.5 SOLUTION RESPIRATORY (INHALATION) at 17:08

## 2025-02-01 RX ADMIN — Medication 1 DROP(S): at 02:00

## 2025-02-01 RX ADMIN — IPRATROPIUM BROMIDE AND ALBUTEROL SULFATE 3 MILLILITER(S): .5; 2.5 SOLUTION RESPIRATORY (INHALATION) at 23:34

## 2025-02-01 RX ADMIN — Medication 15 MILLILITER(S): at 06:12

## 2025-02-01 RX ADMIN — DOXAZOSIN MESYLATE 8 MILLIGRAM(S): 8 TABLET ORAL at 21:26

## 2025-02-01 RX ADMIN — Medication 15 MILLILITER(S): at 17:13

## 2025-02-01 RX ADMIN — LEVETIRACETAM 1500 MILLIGRAM(S): 10 INJECTION, SOLUTION INTRAVENOUS at 17:12

## 2025-02-01 RX ADMIN — Medication 200 MILLIGRAM(S): at 17:12

## 2025-02-01 RX ADMIN — ACETYLCYSTEINE 4 MILLILITER(S): 200 INHALANT RESPIRATORY (INHALATION) at 23:34

## 2025-02-01 RX ADMIN — Medication 1 DROP(S): at 14:14

## 2025-02-01 RX ADMIN — FLUTICASONE PROPIONATE 1 SPRAY(S): 50 SPRAY, METERED NASAL at 17:14

## 2025-02-01 RX ADMIN — ACETYLCYSTEINE 4 MILLILITER(S): 200 INHALANT RESPIRATORY (INHALATION) at 17:12

## 2025-02-01 RX ADMIN — FLUTICASONE PROPIONATE 1 SPRAY(S): 50 SPRAY, METERED NASAL at 06:13

## 2025-02-01 RX ADMIN — Medication 1 DROP(S): at 21:27

## 2025-02-01 RX ADMIN — HEPARIN SODIUM 5000 UNIT(S): 1000 INJECTION INTRAVENOUS; SUBCUTANEOUS at 21:27

## 2025-02-01 RX ADMIN — LEVETIRACETAM 1500 MILLIGRAM(S): 10 INJECTION, SOLUTION INTRAVENOUS at 08:18

## 2025-02-01 RX ADMIN — OFLOXACIN 1 DROP(S): 3 SOLUTION OPHTHALMIC at 00:00

## 2025-02-01 RX ADMIN — Medication 150 MILLIGRAM(S): at 06:10

## 2025-02-01 RX ADMIN — Medication 1 DROP(S): at 17:13

## 2025-02-01 RX ADMIN — BACLOFEN 5 MILLIGRAM(S): 10 INJECTION INTRATHECAL at 06:13

## 2025-02-01 RX ADMIN — BACLOFEN 5 MILLIGRAM(S): 10 INJECTION INTRATHECAL at 17:10

## 2025-02-01 RX ADMIN — Medication 1 APPLICATION(S): at 08:18

## 2025-02-01 RX ADMIN — Medication 1 SPRAY(S): at 06:14

## 2025-02-01 RX ADMIN — Medication 150 MILLIGRAM(S): at 11:27

## 2025-02-01 RX ADMIN — Medication 1 APPLICATION(S): at 17:14

## 2025-02-01 RX ADMIN — OFLOXACIN 1 DROP(S): 3 SOLUTION OPHTHALMIC at 23:35

## 2025-02-01 RX ADMIN — Medication 1 DROP(S): at 06:11

## 2025-02-01 RX ADMIN — Medication 1 SPRAY(S): at 17:14

## 2025-02-01 RX ADMIN — HEPARIN SODIUM 5000 UNIT(S): 1000 INJECTION INTRAVENOUS; SUBCUTANEOUS at 08:18

## 2025-02-01 RX ADMIN — OFLOXACIN 1 DROP(S): 3 SOLUTION OPHTHALMIC at 06:11

## 2025-02-01 RX ADMIN — OFLOXACIN 1 DROP(S): 3 SOLUTION OPHTHALMIC at 17:14

## 2025-02-01 RX ADMIN — OFLOXACIN 1 DROP(S): 3 SOLUTION OPHTHALMIC at 11:23

## 2025-02-01 RX ADMIN — Medication 1 APPLICATION(S): at 06:14

## 2025-02-01 RX ADMIN — Medication 1 DROP(S): at 10:54

## 2025-02-01 RX ADMIN — ACETYLCYSTEINE 4 MILLILITER(S): 200 INHALANT RESPIRATORY (INHALATION) at 11:23

## 2025-02-01 NOTE — PROGRESS NOTE ADULT - SUBJECTIVE AND OBJECTIVE BOX
HPI:  Unknown age male, unknown past medical history (reported stroke and MI by coworkers) presented to [facility] with AMS, Pt was working at [employer] when he was found down by coworkers. EMS called and pt brought to [facility] ED. Intubated, sedated, started on cardene for SBPs in 200s. CT head showed brain stem bleed. Transferred to NSICU for further management.  (30 Sep 2024 12:55)    OVERNIGHT EVENTS:GEORGE overnight. Neuro stable.    Hospital Course:  9/30: transferred from [facility]. A line placed. Versed dc'd. Cy Rader at bedside, states pt has family in [place], cannot confirm medications or PMH other than stroke and MI. 250cc bolus 3% given. LR switched to NS. hydralazine 25q8 started, 3% started, switched propofol to precedex   10/1: stability CTH done. Added labetalol, started TF. Palliative consulted. ethics consulted to determine surrogate. febrile 103, pan cx sent  10/2: BD 2, GEORGE overnight. TF resumed. Desatt'd to 80s, FiO2 inc. to 50. Fentanyl given, ABG, CXR ordered. Maxxed on precedex, started on propofol for DARIEN -4 - -5. Precedex dc'd. Duonebs, mucomyst, hypertonic added. 3% dc'd. Cardene dc'd. Start vanc/CTX. Increased labetalol 200q8. MRSA negative, dc'd vanc. ETT pulled back 2cm x 2, good positioning after confirmatory chest xray. Ethics attempting to establish HCP with family. Na 159, starting FW 250q6 for range 150-155.   10/3: BD3, GEORGE o/n, neuro stable. Na elevating, FW increased to 300q6. Dc'd bowel reg for diarrhea. vEEG started. SQH 5000q8 tonight.   10/4: BD 4, albumn bolus, incr. LR to 80 2/2 incr. in Cr, LR to 10 0cc/hr for uptrending Cr. Started 7% hypersal for 48hrs and SL atropine for inline/oral thick secretions. Dc'd CTX and started ancef for MSSA in the sputum. Nephrology consulted for CKD, f/u recs. SBP 170s, given hydralazine 10mg IVP.   10/5: BD5, o/n 10mg IVP hydralazine given for SBP 170s and started on hydralazine 25q8 via OGT. 10mg IV push labetalol for SBP > 160s. RT placed for diarrhea.   10/6: BD6, o/n FW increased to 350q4 per nephrology recs. IV tylenol for temp 100.6, SBp 160s presumed uncomfortable.   10/7: BD7, overnight pancultured for temp 101.8F.   10/8: BD8. GEORGE. Cr bumped. decreased LR to 75cc/hr. Adding simethicone ATC. incr hydralazine 50mgTID. Incr labetalol 300mgTID. Na 145, decreased FWF to 250q6. Start precedex. FENa consistent with intrinsic kidney injury. Pend repeat renal US. Retaining up to 1.3L, bladder scans q6, straight cath PRN  10/9: BD 9. GEORGE overnight. Neuro stable. abd xray for distention w non-specific gas pattern, OGT to LIWS for morning. duonebs/mucomyst to q8 for improving secretions. Changed tube feeds to Jevity 1.5 20cc/hr, low rate due to abdominal distention, nepro dense and more difficult to digest. Tolerating CPAP, confirmed by ABG.   10/10: BD 10. GEORGE overnight. Neuro stable. (+) gabriel for urinary retention on bladder scan. inc TF to goal rate of 40cc/hr. family leaning toward pursuing trach/PEG. 1/2 amp for FS 81.   10/11: BD 11. GEORGE overnight. Neuro stable. Trach/PEG consults placed.   10/12: BD 12. GEORGE overnight. Neuro stable. MRI brain complete.   10/13: BD 13. Increase flomax. Hold SQH after PM dose for trach tm. IVL.   10/14: BD 14. GEORGE overnight, remains on AC/VC. Gabriel placed for urinary retention. Dc'd free water.  S/p trach with pulm. NGT placed and CXR confirmed in good position.   10/15: BD 15, GEORGE ovn. resumed feeds. spiked 101, pan cx sent.   10/16: BD 16. GEORGE ovn. Lokelma 5mg for K+ 5.4. Started vanc q 24/zosyn for empiric PNA coverage, IVF to 100/hr. PEG held for fever.   10/17: BD 17,  ordered serum osm and urine osm for am. Started sinemet for neurostimulation. Increased cardura to 0.8. Started FW 100q4, dc'd IVF. MRSA negative, dc'd vanc. NGT replaced d/t coiling.   10/18: BD 18, GEORGE overnight, neuro stable. Amantadine added for neurostim. zosyn changed to unasyn for acinetobacter baumannii, failed TOV and required SC  10/19: BD 19, GEORGE ovn. cardura 2mg added for retention. labetalol decreased 200q8, hydralazine decreased 25q8. Gabriel replaced.   10/20: BD20, GEORGE overnight. NGT dislodged, replaced. PEG tomorrow w/ gen surg, FW increased to 150q4 and labetalol decreased to 100q8, lokelma given for hyperkalemia.   10/21: BD 21. POD0 PEG placement with Gen surg. decr labetolol to 50q8, incr. cardura to 0.4, started lokelma and phoslo, dc gabriel POD0 PEG placement with Gen surg.  10/22: BD 22. Plan to start TF today via PEG. dc labetalol, Following ophtho recs. Increased apnea settings - found to be in cheyne-moe respiration. CPAP 5/5.  10/23: BD 23. hydralazine d/c'd, trach collar trial today. Rectal tube placed at 6am.  10/24: BD24, o/n lokelma held due to diarrhea. Free water 100q6 resumed. dc'd tamsulosin, amantadine. Incr'd cardura to 8mg qhs. Dc'd FW. Switched jevity to nepro. gabriel placed for high urine output. Started SL atropine for oral secretions. Dc'd free water.  10/25: BD25, o/n decreased suctioning requirements to > q4hrs, GEORGE. Cr improving, cont phoslo, lokelma held at this time. Gabriel placed yest, cont. Tolerating trach collar. Given 500cc plasmalyte bolus for ANIKA. Dc'd sinemet.   10/26: BD26, o/n resumed lokelma 5mg daily and resumed 100cc free water q6hrs. Change in neuro status with new right pupillary dilation with anisocoria (right pupil 6mm fixed and left pupil 3mm briskly reactive). Given 23.4% NaCl bullet, taken for emergent CTH showing mostly resolved pontine hemorrhage, continued brainstem hypodensity likely edema d/t hemorrhage, no new hemorrhage or infarct, no herniation, mild increase in size of left lateral ventricle. Vitals remaine stable. Na goal > 140.   10/27: BD27, o/n GEORGE.Neuro stable. Pend stepdown with airway bed.   10/28: BD 28. GEORGE overnight. Neuro stable. Miralax ordered. Gabriel removed, pending TOV.  10/29: BD 29. GEORGE o/n. Given 2L NS over 8 hrs for increased BUN/Cr ratio. Gabriel placed for frequent straight cath.   10/30: BD 30.   10/31: BD 31. GEORGE overnight. Na 149, increased free water to 200q6. 1L NS for uptrending BUN.   11/1: BD 32. GEORGE overnight. Given 1L NS for dehydration. Na 146, increased FW to 250q6.   11/2: BD 33 GEORGE overnight, neuro stable, given 500cc bolus for net negative status and tachycardia   11/3: BD 34, GEORGE overnight, neuro stable. Patient remains tachycardic, EKG showing sinus tachycardia, given additional 500cc NS bolus. Febrile to 101.9F, pan cultured (without UA), CXR WNL, given tylenol.   11/4: BD 35, GEORGE overnight, neuro stable. Given 1L NS for tachycardia. sputum (+) for stenotropohomas maltophilia.   11/5: BD 36 GEORGE overnight, neuro stable. Vancomycin dc'd. Chest PT BID. ID consulted, cont zosyn.  11/6: BD 37. blood cx + klebsiella dc zosyn changed to cefepime, CTAP ordered, rpt blood cx sent.    11/7: BD 38. Pending CT A/P, given 250cc bolus and starting maintenance fluids overnight. Pending CT A/P after bolus   11/8: BD 39. CT CAP negative for infection.   11/9: BD 40. GEORGE overnight.  11/10: BD 41. GEORGE overnight. desat to 85 on trach collar, O2 inc to 10L and 100%, O2 sat inc to 95. pt tachy to 110s, euvolemic. given tylenol. ABG and CXR ordered. spiked fever, pancultured, RVP negative. AM ABG w pO2 79, rpt w pO2 79. pt appears comfortable, satting 94%.   11/11: BD 42. GEORGE overnight. pt became tachy to 130s, desat to 90 on 100% FiO2 and 10L. suctioned, (+) productive cough. temp 101.4, given 1g IV tylenol and 500cc NS bolus for euvolemia. fever and HR downtrending. LE dopplers negative for dvt  11/12: BD 43, GEORGE ovn, fever and HR downtrending, satting 97% 70% FIO2  11/13: BD 44, GEORGE ovn. started standing tylenol x24 hours for tachycardia. desat to 80s, o2 increased. CXR stable, pending CTA PE protocol.   11/14: BD 45, GEORGE overnight, neuro stable. resp therapy dec FiO2 to 70%.   11/15: BD 46, GEORGE overnight, neuro stable.  Rapid called for desaturation 30s, tachycardic 140s. Patient bagged, 100% fio2, heavily suctioned. CXR/POCUS unremarkable. ABG c/w desaturation. WBC 14.71. Afebrile. O2 improved to 90s and patient upgraded to ICU. ABG paO2 30s improved to 89 on vent. IV Tylenol x 1, sputum sent. Start protonix while o-n vent.   11/16: BD 47. POCUS showed collapsable IVCF, given 1L bolus. Vanco/Cefpime added empriic for PNA, NGT feeds restarted. MRSA swab neg, Vanc DC'd.   11/17: BD 48. GEORGE overnight. 1l bolus for tachycardia. Spiked to 101, cultured. 500cc bolus for tachycardia, tachy to 148 given 25mcg fentanyl, 250cc albumin, 1.5L bolus. 5 IV lopressor with response HR to 100s. +Stenotrophomonas on sputum cx.   11/18: BD 49. GEORGE overnight. Consulted ID, cefepime switched to bactrim x7days. Started hydrochlorothiazine 12.5mg daily.  11/19: BD 50. Tachy 120s, given tylenol and 500cc NS. Tolerating 5/5, switched to TCx. Holding phos binder. D/c Bactrim. D/c gabriel, f/u TOV. Dc'd PPI.   11/20: BD 51. GEORGE ovn. 1600 satting low 90s, mildly tachy to 110s, afebrile, RR wnl. O2 improved to mid 90s while inc O2 to 100% on TCx. CPAP 5/5 placed back on.  11/21: BD 52, GEORGE ovn, tolerating CPAP 5/5. Switch to trach collar during the day if tolerating well. HCTZ held for Cr bump, straight cath frequence increased to q4  11/22: BD 53, GEORGE ovn. Resumed phoslo. Gabriel placed. Resumed HCTZ.   11/23: BD 54. Holding tylenol in setting of possible fever, will require pan cx if febrile. Cr improved today. Cont CPAP. Bowel regimen held i/s/o diarrhea. FOBT negative.  11/24: BD 55. GEORGE overnight. Neuro stable. HCTZ dc'ed, started lisinopril 5. Lokelma dc'ed for K 3.7.   11/25: BD 56, GEORGE overnight. Neuro stable. dc'd lisinopril 5mg. Gabriel dc'd. TOV. 1545 noted to be hypotensive, MAP 50, in supine position on chair, HR 60s, afebrile, O2 96%. Given 1L cc NS bolus, placed back on bed in reverse trendelenberg, improved to map of 66. Neostick at bedside. Vitals check q1h. Dc'ed amlodipine. Failed TOV, bladder scan q6, sc prn. Added back Senna.   11/26: BD 57, GEORGE overnight. Neuro stable. Dc'd phoslo.   11/27: BD 58, GEORGE overnight. Neuro stable.    11/28: BD 59. Gabriel replaced.   11/29: GEORGE.  11/30: GEORGE, neuro stable.   12/1: BD 62, GEORGE overnight  12/2: BD 63, GEORGE overnight.; Given 1L bolus of LR for uptrending BUN/Cr.  12/3: BD 64. Reinstated eye gtt/moisture chamber given increased Rt eye injection  12/4: BD 65. GEORGE overnight. Attempted to speak with ophtho regarding eyelid weight/closure but no answer, full mailbox.   12/5: BD 66, GEORGE overnight, bowel regimen increased and had BM.   12/6: BD 67, GEROGE overnight, neuro stable.  12/7: GEORGE overnight, neuro stable. /110s, given x1 hydralazine 10 mg IVP. Restarted home amlodipine 5mg.  12/8: GEORGE. OOB to chair.     12/11: GEORGE, mucomyst added for thick secretions, simethicone for abd distension, abd xray with stool burden, increased bowel regimen.   12/12: GEORGE overnight.   12/13: GEORGE overnight.   12/14: GEORGE overnight  12/15: o/n Patient became tachycardic HR 120s and 10 minutes later O2 sat dropped as low as 89%. Patient suctioned without improvement in O2 sat and tube feeds found in suction catheter. TFs held.  STAT CXR ordered. STAT labs sent. Respiratory therapist called to bedside and patient trach connected to ventilator. After connection to ventilator and further suctioning O2 sat improved to 97% but patient HR remains 120-130s. Upgraded to NSICU for further management. Vancomycin and zosyn started. CTA  chest PE protocol and CTH ordered. Blood cultures sent.given 500cc bolus, rpt ABG sent pO2 243, CTH and CTA chest done. FS while NPO, FiO2 dec 50 pending ABG. sputum cx positive for few GPC and GNR.   12/16: GEORGE overnight, restarted amlodipine, troponin 75   12/17: GEORGE overnight, neuro stable. Attempted CPAP this morning, did not tolerate and back on full vent support. Dc'd Vanc. Tachycardia to 140s, noted extremities to be twitching along with jaw twitching. Given 2g of Keppra, total 4mg ativan and placed on EEG, full set of labs and lactate negative. Resumed trickle feeds at 20cc/hr. Given 250cc albumin for tachycardia.   12/18: GEORGE overnight. Neuro stable. CTH stable. EEG negative and dc'd.   12/19: GEORGE overnight. neuro stable. SIMV most of day, AC/VC at night.   12/20: GEORGE overnight, neuro stable. remains on VC/AC  12/21: GEORGE ovn. 1L bolus for tachy to 120s-130s.   12/22: GEORGE ovn. Tachy to 120s, given tylenol and IVF. 2mg IV ativan given for L jaw twitching. Sx resolved for 3 minutes and started again. Epilepsy contacted. Given 2mg IV ativan. Continued twitching. Increased keppra to 1500mg BID, 2mg ativan given. Propranolol started for refractory tachycardia. vEEG ordered. albumin given. POCUS performed, no b lines. Started on fosphenytoin, loaded w 20mg/kg then 100mg TID. febrile, pancx, started cefepime 2g q8, vancomycin  12/23: GEORGE ovn. +focal motor seizures on eeg. ID consulted. Vancomycin dc'ed as per ID.  12/24: GEORGE ovn, neuro stable. Baclofen 5 mg q12 started for hiccups. Na 129 from 134. Urine lytes c/w SIADH. Given 250cc 3% bolus. CT chest for infection w/u - f/u read. Repeat Na 136. UA negative  12/25: GEORGE ovn.   12/26: GEORGE ovn  12/27: GEORGE overnight. Blood cx neg @ 4 days, DC cefepime per medicine (sputum colonized).   12/28: Desaturation o/n to 80's, CXR obtained, pulse ox changed and sats resolved. Na 133 from 138, 250cc 3% bolus given. Cefepime resumed until 12/30 per ID. Rpt Na 138.  12/29: GEORGE overnight. Na stable.   12/30: GEORGE overnight. Family meeting with son, pt now DNR.   12/31: GEORGE overnight.   1/1: GEORGE overnight, neuro stable.  1/2: GEORGE overnight, neuro stable. FW decreased to 100q6.   1/3: GEORGE overnight, neuro stable. fosphenytoin IV changed to pheytoin PO via PEG per epilepsy recommendations  1/4: GEORGE overnight, neuro stable. FW incr. to 100q4  1/5: GEORGE overnight, neuro stable.   1/6: GEORGE overnight, neuro stable   1/7: GEORGE overnight, neuro stable. BUN/Cr increased, increased FW to 200q6. Given 1L bolus of LR over 2 hours. Gabriel d/c'd, voiding, continue bladder scan q6.   1/8: GEORGE overnight, neuro stable. BUN/Cr improving.  1/9: GEORGE overnight, neuro stable.   1/10: GEORGE overnight, neuro stable.   1/11: GEORGE overnight, neuro stable, vent settings stable.   1/12: GEORGE overnight, neuro stable. Febrile to 102F, f/u pan cx, chest xray, given tylenol. Zosyn started.   1/13: GEORGE overnight, neuro stable, cont Zosyn for presumed PNA, prelim sputum cx growing; few GS neg diplococci, mod GS neg rods, rare GS + rods, fever trend improved. Free water increased to [id number].   1/14: GEORGE overnight. pending renal US for elevated Cr  1/15: GEORGE ovn. renal US complete.   1/16: GEORGE overnight, neuro stable   1/17: GEORGE overnight, neuro stable   1/18: GEORGE overnight, neuro stable.   1/19: GEORGE overnight, neuro stable. Propranolol dec to 5q8.   1/20: GEORGE overnight, neuro stable. Weaned propranolol to 5mg BID.   1/21: GEORGE ovn, in AM having rapid vertical eye movements with facial twitching, 2g total keppra given for AM dose and phenytoin level ordered, vital signs stable. CTH stable. Phenytoin increased to 100/100/150mg per epilepsy  1/22: GEORGE ovn. IV hydral x 1 for SBP 170s.   1/23: Cont to have focal motor seizures. Per epilepsy, changed phenytoin dose to 100/100/200.   1/24: Phenytoin lvl tmrw AM. Chest Xray completed due to temp 100.2F  1/25: GEORGE overnight. Incr dilantin morning and afternoon per epilepsy.   1/26: GEORGE overnight. Sustained focal twitching noticed by nursing. Ativan 8mg in total given. Fosphenytoin 1500mg IV given. Placed on EEG. Epilepsy following. TFs held. Phenytoin increased to 150/150/200.   1/27: o/n CTH completed due to continued lethargy after ativan given yesterday afternoon. TFs resumed. 500cc bolus NS given for SBP 90s. EEG dc'ed, discussed w/ Dr. Tomlin. Febrile 101F, pan cx sent. Likely left sided infiltrate on CXR, c/f aspiration PNA. Started on vanc/zosyn. MRSA swab pending.   1/28: Neuro stable, on vanc/zosyn. +UTI on UA, MRSA negative. Vanc dc'd. RVP negative. Zosyn increased to pseudomonal dosing.   1/29: GEORGE overnight, neuro stable.  1/30: GEORGE overnight, neuro stable. Dc'd zosyn due to resistance, switched to Levaquin.  1/31: GEORGE ovenight, neuro stable.     Vital Signs Last 24 Hrs  T(C): 36.8 (31 Jan 2025 22:14), Max: 36.8 (31 Jan 2025 22:14)  T(F): 98.3 (31 Jan 2025 22:14), Max: 98.3 (31 Jan 2025 22:14)  HR: 72 (31 Jan 2025 21:55) (63 - 74)  BP: 132/86 (31 Jan 2025 21:55) (111/75 - 132/86)  BP(mean): 103 (31 Jan 2025 21:55) (89 - 107)  RR: 14 (31 Jan 2025 21:55) (14 - 18)  SpO2: 100% (31 Jan 2025 21:55) (96% - 100%)    Parameters below as of 31 Jan 2025 21:55  Patient On (Oxygen Delivery Method): ventilator    O2 Concentration (%): 40    I&O's Summary    30 Jan 2025 07:01  -  31 Jan 2025 07:00  --------------------------------------------------------  IN: 2580 mL / OUT: 1120 mL / NET: 1460 mL    31 Jan 2025 07:01  -  01 Feb 2025 00:23  --------------------------------------------------------  IN: 1680 mL / OUT: 350 mL / NET: 1330 mL        PHYSICAL EXAM:  Constitutional: Patient is resting comfortably in stretcher, in no acute distress.  Respiratory: +trach to vent, breathing non-labored, symmetrical chest wall movement  Cardiovascuar: RRR, no murmurs  Gastrointestinal: +PEG, abdomen soft, non tender  Genitourinary: exam deffered  Neurological:  Opens eyes spontaneously. A&Ox0. Following commands.   Cranial Nerves: pupils 4mm equal, round and reactive to light, tracking vertically to command, facial movements appear symmetric, nonverbal   Motor: RUE squeezes hand, RLE wiggles toes, LUE 0/5, LLE withdraws to noxious  Sensation: intact to light touch in all extremities      LABS:                        10.3   6.04  )-----------( 231      ( 31 Jan 2025 06:06 )             32.5     01-31    135  |  100  |  37[H]  ----------------------------<  95  4.4   |  25  |  1.15    Ca    8.8      31 Jan 2025 06:06  Phos  4.0     01-31  Mg     2.0     01-31        Urinalysis Basic - ( 31 Jan 2025 06:06 )    Color: x / Appearance: x / SG: x / pH: x  Gluc: 95 mg/dL / Ketone: x  / Bili: x / Urobili: x   Blood: x / Protein: x / Nitrite: x   Leuk Esterase: x / RBC: x / WBC x   Sq Epi: x / Non Sq Epi: x / Bacteria: x          CAPILLARY BLOOD GLUCOSE          Drug Levels: [] N/A  Phenytoin Level, Serum: 16.9 ug/mL (01-28 @ 10:41)  Phenytoin Level, Serum: 18.2 ug/mL (01-27 @ 05:30)  Phenytoin Level, Serum: 3.5 ug/mL (01-26 @ 05:30)    CSF Analysis: [] N/A      Allergies    Allergy Status Unknown    Intolerances      MEDICATIONS:  Antibiotics:  levoFLOXacin  Tablet 750 milliGRAM(s) Oral every 24 hours    Neuro:  acetaminophen   Oral Liquid .. 650 milliGRAM(s) Oral every 6 hours PRN  baclofen 5 milliGRAM(s) Oral every 12 hours  levETIRAcetam 1500 milliGRAM(s) Oral two times a day  phenytoin   Suspension 150 milliGRAM(s) Oral <User Schedule>  phenytoin   Suspension 200 milliGRAM(s) Oral <User Schedule>    Anticoagulation:  heparin   Injectable 5000 Unit(s) SubCutaneous every 8 hours    OTHER:  acetylcysteine 10%  Inhalation 4 milliLiter(s) Inhalation every 6 hours  albuterol/ipratropium for Nebulization 3 milliLiter(s) Nebulizer every 6 hours  amLODIPine   Tablet 5 milliGRAM(s) Oral daily  artificial tears (preservative free) Ophthalmic Solution 1 Drop(s) Right EYE every 4 hours  chlorhexidine 0.12% Liquid 15 milliLiter(s) Oral Mucosa every 12 hours  chlorhexidine 2% Cloths 1 Application(s) Topical <User Schedule>  doxazosin 8 milliGRAM(s) Oral at bedtime  erythromycin   Ointment 1 Application(s) Right EYE at bedtime  fluticasone propionate 50 MICROgram(s)/spray Nasal Spray 1 Spray(s) Both Nostrils two times a day  influenza   Vaccine 0.5 milliLiter(s) IntraMuscular once  ofloxacin 0.3% Solution 1 Drop(s) Right EYE four times a day  pantoprazole   Suspension 40 milliGRAM(s) Oral daily  petrolatum Ophthalmic Ointment 1 Application(s) Both EYES two times a day  propranolol 5 milliGRAM(s) Oral every 12 hours  sodium chloride 0.65% Nasal 1 Spray(s) Both Nostrils two times a day    IVF:    CULTURES:  Culture Results:   >100,000 CFU/ml Klebsiella aerogenes (Previously Enterobacter) (01-27 @ 23:20)  Culture Results:   No growth at 72 Hours (01-27 @ 22:35)    RADIOLOGY & ADDITIONAL TESTS:      ASSESSMENT:  44M PMH ?stroke/MI present to [facility] after collapsing at work. Decorticate posturing, vomiting, intubated for airway protection. 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 Transferred to [facility] for further management. s/p trach 10/14. s/p peg 10/21. Re-upgrade to ICU 2/2 desaturation event and suctioning requirements 11/15. Re-upgrade to NSICU 12/15 2/2 desaturation and tachycardia.    Neuro:  - neuro/vitals q4h  - pain control: tylenol prn  - seizure tx: keppra 1500mg BID, phenytoin 150/150/200  - s/p vEEG (1/26-1/27)vEEG (10/3-4) negative, (10/17-10/19) negative, (12/17-12/18) negative, (12/22-12/25), (1/26-1/28),  +focal motor seizures not correlating w/ EEG,  - CTH 9/30: enlarged pontine hemorrhage, CTH 10/3: stable, CTH 10/25: mostly resolved pontine hemorrhage, CTH 12/15: R mastoid air cell opacification; acute otitis media vs sterile effusion, CTH 12/18: stable. CTH 1/21 stable, CTH 1/27 stable  - MRI brain 10/12: parenchymal hemorrhage, acute/subacute R cerebellar stroke      CV:  - -160  - tachycardia: propranolol 5mg BID   - HTN: amlodipine 5mg  - echo (9/30) EF 75%, repeat 12/16: 57%    Resp:  - trach to vent, AC/VC 40/400/14/6  - Secretions: duonebs/mucomyst/chest PT q6h     GI:  - TFs via PEG  - bowel regimen held for loose stool, last BM 1/30  - baclofen 5q12 for hiccups    Renal:  - FW 200cc q4h for hydration  - Urinary retenion: Gabriel removed 1/7, SC prn  - CKD: trend BUN/Cr  - renal US 10/1, 10/8, 1/15: Increased bilateral renal echogenicity consistent with medical renal disease    Endo:  - A1c 5.4    Heme:  - DVT ppx: SCDs, SQH 5000u q8h   - LE dopplers negative 12/16    ID:  - last pan cx 1/27, +klebsiella UTI   - Levaquin (1/30-2/4), zosyn dc'd due to resistance, vanc dc'd, MRSA negative 1/28  - empiric PNA: cefepime (12/22-12/30), s/p vanc (12/22-12/23), s/p zosyn (12/15-12/20), s/p vanc (12/15-12/16), s/p zosyn (1/12 -1/18)  - s/p Stenotrophomonas maltophilia PNA: s/p Bactrim (11/18-11/19) s/p Cefepime (11/16-11/18)  - 11/3, (+) sputum for stenotrophomas maltophlia, blood cx (+) klebsiella, cefepime 2gq12 (11/6 - 11/12)   - empiric tx: s/p zosyn (11/3-11/6) , s/p vanc  (11/3-11/5)  - S/p Ancef (10/4-10/14) for PNA, and s/p Unasyn (10/18-10/23) +actinobacter baumanii     MISC:  - ophtho consult for keratitis  - erythromycin ointment R eye q4hrs, ofloxacin ointment R eye QID, artificial tears R eye q2hrs, moisture chamber at bedtime    Dispo: SDU status, DNR, pending PRUCOL for benefits     D/w Dr. D'Amico

## 2025-02-01 NOTE — PROVIDER CONTACT NOTE (CHANGE IN STATUS NOTIFICATION) - SITUATION
Pt began de-satting to 84 the lowest on vent, using expiratory muscles Pt began desaturating to 84 the lowest on vent, using expiratory muscles

## 2025-02-01 NOTE — PROVIDER CONTACT NOTE (CHANGE IN STATUS NOTIFICATION) - RECOMMENDATIONS
will contact Epilepsy team
Monitor BP, give fosphenytoin IVPB, give 2mg ativan x2, give 4mg ativan x 1. Monitor patient.
Decision made to place pt back on vent with CPAP support.
see patient at bedside
Per Team Neuro
RAMONA Gudino was notified and came to the bedside to assess patient.

## 2025-02-01 NOTE — PROGRESS NOTE ADULT - SUBJECTIVE AND OBJECTIVE BOX
Patient was seen and examined at bedside. Case discuss with the primary team. Pt w/o any events overnight.     OBJECTIVE:  Vital Signs Last 24 Hrs  T(C): 36.4 (01 Feb 2025 08:41), Max: 37.2 (01 Feb 2025 05:21)  T(F): 97.5 (01 Feb 2025 08:41), Max: 99 (01 Feb 2025 05:21)  HR: 74 (01 Feb 2025 08:40) (63 - 82)  BP: 134/92 (01 Feb 2025 08:40) (123/83 - 134/92)  BP(mean): 109 (01 Feb 2025 08:40) (98 - 109)  RR: 14 (01 Feb 2025 08:40) (14 - 17)  SpO2: 100% (01 Feb 2025 08:40) (96% - 100%)    Parameters below as of 01 Feb 2025 08:40  Patient On (Oxygen Delivery Method): ventilator    O2 Concentration (%): 40      PHYSICAL EXAM:  General: minimally responsive on ventilator, NAD, no labored breathing  HEENT: trach collar in place, clean  Lungs: anterior exam, limited, no crackles, no wheezes  Heart: regular  Abdomen: soft, no distension, + BS  Extremities:  warm, trace edema, not following commands       LABS:                        10.3   6.04  )-----------( 231      ( 31 Jan 2025 06:06 )             32.5     01-31    135  |  100  |  37[H]  ----------------------------<  95  4.4   |  25  |  1.15    Ca    8.8      31 Jan 2025 06:06  Phos  4.0     01-31  Mg     2.0     01-31    acetaminophen   Oral Liquid .. 650 milliGRAM(s) Oral every 6 hours PRN  acetylcysteine 10%  Inhalation 4 milliLiter(s) Inhalation every 6 hours  albuterol/ipratropium for Nebulization 3 milliLiter(s) Nebulizer every 6 hours  amLODIPine   Tablet 5 milliGRAM(s) Oral daily  artificial tears (preservative free) Ophthalmic Solution 1 Drop(s) Right EYE every 4 hours  baclofen 5 milliGRAM(s) Oral every 12 hours  chlorhexidine 0.12% Liquid 15 milliLiter(s) Oral Mucosa every 12 hours  chlorhexidine 2% Cloths 1 Application(s) Topical <User Schedule>  doxazosin 8 milliGRAM(s) Oral at bedtime  erythromycin   Ointment 1 Application(s) Right EYE at bedtime  fluticasone propionate 50 MICROgram(s)/spray Nasal Spray 1 Spray(s) Both Nostrils two times a day  heparin   Injectable 5000 Unit(s) SubCutaneous every 8 hours  influenza   Vaccine 0.5 milliLiter(s) IntraMuscular once  levETIRAcetam 1500 milliGRAM(s) Oral two times a day  levoFLOXacin  Tablet 750 milliGRAM(s) Oral every 24 hours  ofloxacin 0.3% Solution 1 Drop(s) Right EYE four times a day  pantoprazole   Suspension 40 milliGRAM(s) Oral daily  petrolatum Ophthalmic Ointment 1 Application(s) Both EYES two times a day  phenytoin   Suspension 150 milliGRAM(s) Oral <User Schedule>  phenytoin   Suspension 200 milliGRAM(s) Oral <User Schedule>  propranolol 5 milliGRAM(s) Oral every 12 hours  sodium chloride 0.65% Nasal 1 Spray(s) Both Nostrils two times a day

## 2025-02-01 NOTE — PROVIDER CONTACT NOTE (CHANGE IN STATUS NOTIFICATION) - NAME OF MD/NP/PA/DO NOTIFIED:
Neuro PA Katie Caldwell, Respiratory team
RAMONA Anderson
RAMONA Gudino
neuro RAMONA nava
RAMONA Andrade
RAMONA Castillo.
RAMONA Murray
RAMONA Anderson team Neuro
RAMONA Harden

## 2025-02-01 NOTE — PROGRESS NOTE ADULT - ASSESSMENT
44M with unclear PMH (?stroke, MI) who was found down at work, intubated for airway protection and found to have acute parenchymal hemorrhage within nabil with mass effect (+ acute/subacute right cerebellar infarct) in setting of hypertensive emergency, transferred to [facility] for further neurosurgical care. Hospital course c/b poor neurologic recovery s/p trach-PEG, AUR s/p gabriel, ANIKA on CKD c/b hyperkalemia, HAP s/p amp-sulbactam (EOT 10/23), K aerogenes bacteremia  treated with 2 weeks of Cefepime per ID , On 11/15 he became septic and was transferred to NSICU due to increased o2 requirements and needed Vent support , Trach Cultures + for Stenotrophomonas which was treated with 7 days of Bactrim per ID , has been weaned of Vent since 11/23 and is now on 10lts at 40% o2 through Trach Collar. Stepped up to the NSICU on 12/15 for hypoxia and tachycardia. PE ruled out. On abx for 5 days. Unclear etiology. Now stepped down to 8Lach.    # Pontine hemorrhage  # Encephalopathy due to Intracranial Bleed   - Acute parenchymal hemorrhage within nabil with mass effect (+ acute/subacute right cerebellar infarct) in setting of hypertensive emergency.   - MRI brain also demonstrated acute/subacute R cerebellar stroke.   - No Neurosurgical Interventions were performed   - Secondary to IPH and cerebellar stroke patient has become Trach and PEG Dependent    #Klebsiella UTI  -Pt has been afebrile over 24hrs   -Urine cx 1/27 growing Klebsiella   - MRSA nares negative  - Pt was initially started on pseudomonal dosing of zosyn however pt abx were changed to Levaquin on 1/20.    - Will continue Levaquin x5days (EOT: 2/3)    # Seizures  - Continue Seizure precautions   - Continue Keppra and phenytoin  - appreciate input from epilepsy    # Hypertension  - Continue amlodipine 5mg daily with holding parameters, uptitrate regimen as needed    # Chronic respiratory failure.   - s/p percutaneous trach by pulm on 10/14/24  - Currently on Vent Support   - Continue Chest PT    # Neurogenic dysphagia.   - Pt s/p PEG with surgery 10/21/24  - TF per nutrition  - aspiration precautions and elevation of HOB.    #Acute urinary retention.   - doxazosin 8mg  - continue to monitor urine outpatient     # Functional quadriplegia in setting of brainstem hemorrhage  - decub precautions  - care per nursing protocol       55 minutes spent on total encounter. The necessity of the time spent during the encounter on this date of service was due to:    Review of hospital course, labs, vitals, medical records.  Bedside exam and interview of the patient  Discussed plan of care with primary team   Documenting the encounter.

## 2025-02-01 NOTE — PROVIDER CONTACT NOTE (CHANGE IN STATUS NOTIFICATION) - ACTION/TREATMENT ORDERED:
Chest x-ray ordered and labs drawn. Patient sent to ICU for further monitoring
repositioning of pt to lying flat, administer albuterol , abg, and ultrasound of lungs
RAMONA Gudino ordered STAT ABG performed by the PA herself and ordered a bedside chest X-ray.
MD Iyer aware and at bedside with RAMONA Wade. 8mg total of ativan given. Monitoring ongoing.
New pulse ox applied, chest x ray ordered, ambu bag at bedside attached to oxygen flow, monitoring ongoing
RT called to reinflate cuff and place pt on vent. Prior to incident, pt was slotted to stepdown and report had been given to 8L RN Daniel. Transfer canceled. Will watch pt overnight in ICU.
continue to monitor. no intervention needed at this time. this is the patients baseline.
1 liter bolus given at this time, pt put back in the bed by staff and pt placed in reverse trend. Neuro team at bedside to assess and evaluate. Pt safety maintained and BP improving. Will continue to monitor. Endorsed this incident to primary nurse Vanessa.

## 2025-02-01 NOTE — PROVIDER CONTACT NOTE (CHANGE IN STATUS NOTIFICATION) - ASSESSMENT
Respiratory rate increased, wheezing in right side lungs, pt de satting to 84 the lowest Respiratory rate increased, wheezing in right side lungs, pt desaturating to 84 the lowest

## 2025-02-01 NOTE — PROVIDER CONTACT NOTE (CHANGE IN STATUS NOTIFICATION) - DATE AND TIME:
01-Feb-2025 19:20
26-Dec-2024 16:41
26-Jan-2025 17:21
15-Dec-2024 00:52
26-Jan-2025 20:09
27-Dec-2024 19:55
10-Nov-2024 04:50
20-Nov-2024 16:30
25-Nov-2024 15:38

## 2025-02-02 LAB
ANION GAP SERPL CALC-SCNC: 14 MMOL/L — SIGNIFICANT CHANGE UP (ref 5–17)
BUN SERPL-MCNC: 36 MG/DL — HIGH (ref 7–23)
CALCIUM SERPL-MCNC: 9.4 MG/DL — SIGNIFICANT CHANGE UP (ref 8.4–10.5)
CHLORIDE SERPL-SCNC: 100 MMOL/L — SIGNIFICANT CHANGE UP (ref 96–108)
CO2 SERPL-SCNC: 24 MMOL/L — SIGNIFICANT CHANGE UP (ref 22–31)
CREAT SERPL-MCNC: 1.18 MG/DL — SIGNIFICANT CHANGE UP (ref 0.5–1.3)
CULTURE RESULTS: SIGNIFICANT CHANGE UP
EGFR: 77 ML/MIN/1.73M2 — SIGNIFICANT CHANGE UP
EGFR: 77 ML/MIN/1.73M2 — SIGNIFICANT CHANGE UP
GLUCOSE SERPL-MCNC: 149 MG/DL — HIGH (ref 70–99)
HCT VFR BLD CALC: 35.3 % — LOW (ref 39–50)
HGB BLD-MCNC: 11.1 G/DL — LOW (ref 13–17)
MAGNESIUM SERPL-MCNC: 2.1 MG/DL — SIGNIFICANT CHANGE UP (ref 1.6–2.6)
MCHC RBC-ENTMCNC: 29.8 PG — SIGNIFICANT CHANGE UP (ref 27–34)
MCHC RBC-ENTMCNC: 31.4 G/DL — LOW (ref 32–36)
MCV RBC AUTO: 94.9 FL — SIGNIFICANT CHANGE UP (ref 80–100)
NRBC # BLD: 0 /100 WBCS — SIGNIFICANT CHANGE UP (ref 0–0)
NRBC BLD-RTO: 0 /100 WBCS — SIGNIFICANT CHANGE UP (ref 0–0)
PHOSPHATE SERPL-MCNC: 4.1 MG/DL — SIGNIFICANT CHANGE UP (ref 2.5–4.5)
PLATELET # BLD AUTO: 244 K/UL — SIGNIFICANT CHANGE UP (ref 150–400)
POTASSIUM SERPL-MCNC: 3.5 MMOL/L — SIGNIFICANT CHANGE UP (ref 3.5–5.3)
POTASSIUM SERPL-SCNC: 3.5 MMOL/L — SIGNIFICANT CHANGE UP (ref 3.5–5.3)
RBC # BLD: 3.72 M/UL — LOW (ref 4.2–5.8)
RBC # FLD: 13.1 % — SIGNIFICANT CHANGE UP (ref 10.3–14.5)
SODIUM SERPL-SCNC: 138 MMOL/L — SIGNIFICANT CHANGE UP (ref 135–145)
SPECIMEN SOURCE: SIGNIFICANT CHANGE UP
WBC # BLD: 8.55 K/UL — SIGNIFICANT CHANGE UP (ref 3.8–10.5)
WBC # FLD AUTO: 8.55 K/UL — SIGNIFICANT CHANGE UP (ref 3.8–10.5)

## 2025-02-02 PROCEDURE — 99232 SBSQ HOSP IP/OBS MODERATE 35: CPT

## 2025-02-02 PROCEDURE — 99233 SBSQ HOSP IP/OBS HIGH 50: CPT

## 2025-02-02 RX ADMIN — Medication 200 MILLIGRAM(S): at 18:03

## 2025-02-02 RX ADMIN — Medication 150 MILLIGRAM(S): at 05:12

## 2025-02-02 RX ADMIN — Medication 1 APPLICATION(S): at 05:12

## 2025-02-02 RX ADMIN — Medication 150 MILLIGRAM(S): at 11:04

## 2025-02-02 RX ADMIN — OFLOXACIN 1 DROP(S): 3 SOLUTION OPHTHALMIC at 17:33

## 2025-02-02 RX ADMIN — BACLOFEN 5 MILLIGRAM(S): 10 INJECTION INTRATHECAL at 17:39

## 2025-02-02 RX ADMIN — Medication 1 SPRAY(S): at 05:06

## 2025-02-02 RX ADMIN — Medication 1 DROP(S): at 17:33

## 2025-02-02 RX ADMIN — LEVETIRACETAM 1500 MILLIGRAM(S): 10 INJECTION, SOLUTION INTRAVENOUS at 05:05

## 2025-02-02 RX ADMIN — DOXAZOSIN MESYLATE 8 MILLIGRAM(S): 8 TABLET ORAL at 21:00

## 2025-02-02 RX ADMIN — Medication 1 DROP(S): at 21:00

## 2025-02-02 RX ADMIN — ERYTHROMYCIN 1 APPLICATION(S): 5 OINTMENT OPHTHALMIC at 21:00

## 2025-02-02 RX ADMIN — ACETYLCYSTEINE 4 MILLILITER(S): 200 INHALANT RESPIRATORY (INHALATION) at 05:05

## 2025-02-02 RX ADMIN — OFLOXACIN 1 DROP(S): 3 SOLUTION OPHTHALMIC at 23:00

## 2025-02-02 RX ADMIN — Medication 1 SPRAY(S): at 17:33

## 2025-02-02 RX ADMIN — IPRATROPIUM BROMIDE AND ALBUTEROL SULFATE 3 MILLILITER(S): .5; 2.5 SOLUTION RESPIRATORY (INHALATION) at 11:05

## 2025-02-02 RX ADMIN — Medication 1 DROP(S): at 13:16

## 2025-02-02 RX ADMIN — HEPARIN SODIUM 5000 UNIT(S): 1000 INJECTION INTRAVENOUS; SUBCUTANEOUS at 21:00

## 2025-02-02 RX ADMIN — OFLOXACIN 1 DROP(S): 3 SOLUTION OPHTHALMIC at 05:09

## 2025-02-02 RX ADMIN — Medication 15 MILLILITER(S): at 05:04

## 2025-02-02 RX ADMIN — Medication 40 MILLIGRAM(S): at 11:02

## 2025-02-02 RX ADMIN — HEPARIN SODIUM 5000 UNIT(S): 1000 INJECTION INTRAVENOUS; SUBCUTANEOUS at 13:16

## 2025-02-02 RX ADMIN — IPRATROPIUM BROMIDE AND ALBUTEROL SULFATE 3 MILLILITER(S): .5; 2.5 SOLUTION RESPIRATORY (INHALATION) at 23:00

## 2025-02-02 RX ADMIN — Medication 1 APPLICATION(S): at 05:08

## 2025-02-02 RX ADMIN — Medication 15 MILLILITER(S): at 17:35

## 2025-02-02 RX ADMIN — FLUTICASONE PROPIONATE 1 SPRAY(S): 50 SPRAY, METERED NASAL at 17:33

## 2025-02-02 RX ADMIN — IPRATROPIUM BROMIDE AND ALBUTEROL SULFATE 3 MILLILITER(S): .5; 2.5 SOLUTION RESPIRATORY (INHALATION) at 17:34

## 2025-02-02 RX ADMIN — LEVETIRACETAM 1500 MILLIGRAM(S): 10 INJECTION, SOLUTION INTRAVENOUS at 17:38

## 2025-02-02 RX ADMIN — ACETYLCYSTEINE 4 MILLILITER(S): 200 INHALANT RESPIRATORY (INHALATION) at 17:38

## 2025-02-02 RX ADMIN — ACETYLCYSTEINE 4 MILLILITER(S): 200 INHALANT RESPIRATORY (INHALATION) at 11:06

## 2025-02-02 RX ADMIN — ACETYLCYSTEINE 4 MILLILITER(S): 200 INHALANT RESPIRATORY (INHALATION) at 23:00

## 2025-02-02 RX ADMIN — Medication 40 MILLIEQUIVALENT(S): at 08:57

## 2025-02-02 RX ADMIN — BACLOFEN 5 MILLIGRAM(S): 10 INJECTION INTRATHECAL at 05:05

## 2025-02-02 RX ADMIN — OFLOXACIN 1 DROP(S): 3 SOLUTION OPHTHALMIC at 11:02

## 2025-02-02 RX ADMIN — Medication 1 APPLICATION(S): at 17:34

## 2025-02-02 RX ADMIN — Medication 1 DROP(S): at 05:10

## 2025-02-02 RX ADMIN — Medication 1 DROP(S): at 10:05

## 2025-02-02 RX ADMIN — AMLODIPINE BESYLATE 5 MILLIGRAM(S): 10 TABLET ORAL at 05:04

## 2025-02-02 RX ADMIN — Medication 1 DROP(S): at 02:00

## 2025-02-02 RX ADMIN — HEPARIN SODIUM 5000 UNIT(S): 1000 INJECTION INTRAVENOUS; SUBCUTANEOUS at 05:04

## 2025-02-02 RX ADMIN — FLUTICASONE PROPIONATE 1 SPRAY(S): 50 SPRAY, METERED NASAL at 05:06

## 2025-02-02 RX ADMIN — IPRATROPIUM BROMIDE AND ALBUTEROL SULFATE 3 MILLILITER(S): .5; 2.5 SOLUTION RESPIRATORY (INHALATION) at 05:04

## 2025-02-02 NOTE — PROGRESS NOTE ADULT - SUBJECTIVE AND OBJECTIVE BOX
HPI:  Unknown age male, unknown past medical history (reported stroke and MI by coworkers) presented to [facility] with AMS, Pt was working at [employer] when he was found down by coworkers. EMS called and pt brought to [facility] ED. Intubated, sedated, started on cardene for SBPs in 200s. CT head showed brain stem bleed. Transferred to NSICU for further management.  (30 Sep 2024 12:55)    INTERVAL EVENTS:  Sats improved with nebs. Continued facial twitching.     HOSPITAL COURSE:  1/1: GEORGE overnight, neuro stable.  1/2: GEORGE overnight, neuro stable. FW decreased to 100q6.   1/3: GEORGE overnight, neuro stable. fosphenytoin IV changed to pheytoin PO via PEG per epilepsy recommendations  1/4: GEORGE overnight, neuro stable. FW incr. to 100q4  1/5: GEORGE overnight, neuro stable.   1/6: GEORGE overnight, neuro stable   1/7: GEORGE overnight, neuro stable. BUN/Cr increased, increased FW to 200q6. Given 1L bolus of LR over 2 hours. Vuong d/c'd, voiding, continue bladder scan q6.   1/8: GEORGE overnight, neuro stable. BUN/Cr improving.  1/9: GEORGE overnight, neuro stable.   1/10: GEORGE overnight, neuro stable.   1/11: GEORGE overnight, neuro stable, vent settings stable.   1/12: GEORGE overnight, neuro stable. Febrile to 102F, f/u pan cx, chest xray, given tylenol. Zosyn started.   1/13: GEORGE overnight, neuro stable, cont Zosyn for presumed PNA, prelim sputum cx growing; few GS neg diplococci, mod GS neg rods, rare GS + rods, fever trend improved. Free water increased to [id number].   1/14: GEORGE overnight. pending renal US for elevated Cr  1/15: GEORGE ovn. renal US complete.   1/16: GEORGE overnight, neuro stable   1/17: GEORGE overnight, neuro stable   1/18: GEORGE overnight, neuro stable.   1/19: GEORGE overnight, neuro stable. Propranolol dec to 5q8.   1/20: GEORGE overnight, neuro stable. Weaned propranolol to 5mg BID.   1/21: GEORGE ovn, in AM having rapid vertical eye movements with facial twitching, 2g total keppra given for AM dose and phenytoin level ordered, vital signs stable. CTH stable. Phenytoin increased to 100/100/150mg per epilepsy  1/22: GEORGE ovn. IV hydral x 1 for SBP 170s.   1/23: Cont to have focal motor seizures. Per epilepsy, changed phenytoin dose to 100/100/200.   1/24: Phenytoin lvl tmrw AM. Chest Xray completed due to temp 100.2F  1/25: GEORGE overnight. Incr dilantin morning and afternoon per epilepsy.   1/26: GEORGE overnight. Sustained focal twitching noticed by nursing. Ativan 8mg in total given. Fosphenytoin 1500mg IV given. Placed on EEG. Epilepsy following. TFs held. Phenytoin increased to 150/150/200.   1/27: o/n CTH completed due to continued lethargy after ativan given yesterday afternoon. TFs resumed. 500cc bolus NS given for SBP 90s. EEG dc'ed, discussed w/ Dr. Tomlin. Febrile 101F, pan cx sent. Likely left sided infiltrate on CXR, c/f aspiration PNA. Started on vanc/zosyn. MRSA swab pending.   1/28: Neuro stable, on vanc/zosyn. +UTI on UA, MRSA negative. Vanc dc'd. RVP negative. Zosyn increased to pseudomonal dosing.   1/29: GEORGE overnight, neuro stable.  1/30: GEORGE overnight, neuro stable. Dc'd zosyn due to resistance, switched to Levaquin.  1/31: GEORGE ovenight, neuro stable.   2/1: Brief desat. Improved with neb and reposititioning. ABG and POCUS wnl.     Vital Signs Last 24 Hrs  T(C): 37 (01 Feb 2025 21:25), Max: 37.2 (01 Feb 2025 05:21)  T(F): 98.6 (01 Feb 2025 21:25), Max: 99 (01 Feb 2025 05:21)  HR: 96 (01 Feb 2025 23:45) (74 - 97)  BP: 125/83 (01 Feb 2025 23:45) (123/83 - 134/92)  BP(mean): 99 (01 Feb 2025 23:45) (98 - 109)  RR: 18 (01 Feb 2025 23:45) (14 - 18)  SpO2: 96% (01 Feb 2025 23:45) (96% - 100%)    Parameters below as of 01 Feb 2025 23:45  Patient On (Oxygen Delivery Method): ventilator    O2 Concentration (%): 40    I&O's Summary    31 Jan 2025 07:01  -  01 Feb 2025 07:00  --------------------------------------------------------  IN: 1840 mL / OUT: 1400 mL / NET: 440 mL    01 Feb 2025 07:01  -  02 Feb 2025 00:05  --------------------------------------------------------  IN: 1260 mL / OUT: 1230 mL / NET: 30 mL        PHYSICAL EXAM:  Constitutional: NAD  Respiratory: +trach to vent, breathing non-labored, symmetrical chest wall movement, +wheezing to RLL  Cardiovascuar: RRR, no murmurs  Gastrointestinal: +PEG, abdomen soft, non tender  Neurological:  Opens eyes spontaneously. A&Ox0. Following commands.   Cranial Nerves: pupils 4mm equal, round and reactive to light, not tracking vertically to command, facial movements appear symmetric, nonverbal   Motor: RUE squeezes hand, RLE wiggles toes, LUE 0/5, LLE withdraws to noxious    LABS:                        10.3   6.04  )-----------( 231      ( 31 Jan 2025 06:06 )             32.5     01-31    135  |  100  |  37[H]  ----------------------------<  95  4.4   |  25  |  1.15    Ca    8.8      31 Jan 2025 06:06  Phos  4.0     01-31  Mg     2.0     01-31        Urinalysis Basic - ( 31 Jan 2025 06:06 )    Color: x / Appearance: x / SG: x / pH: x  Gluc: 95 mg/dL / Ketone: x  / Bili: x / Urobili: x   Blood: x / Protein: x / Nitrite: x   Leuk Esterase: x / RBC: x / WBC x   Sq Epi: x / Non Sq Epi: x / Bacteria: x          CAPILLARY BLOOD GLUCOSE          Drug Levels: [] N/A  Phenytoin Level, Serum: 16.9 ug/mL (01-28 @ 10:41)  Phenytoin Level, Serum: 18.2 ug/mL (01-27 @ 05:30)  Phenytoin Level, Serum: 3.5 ug/mL (01-26 @ 05:30)    CSF Analysis: [] N/A      Allergies    Allergy Status Unknown    Intolerances      MEDICATIONS:  Antibiotics:  levoFLOXacin  Tablet 750 milliGRAM(s) Oral every 24 hours    Neuro:  acetaminophen   Oral Liquid .. 650 milliGRAM(s) Oral every 6 hours PRN  baclofen 5 milliGRAM(s) Oral every 12 hours  levETIRAcetam 1500 milliGRAM(s) Oral every 12 hours  LORazepam   Injectable 2 milliGRAM(s) IV Push once PRN  phenytoin   Suspension 150 milliGRAM(s) Oral <User Schedule>  phenytoin   Suspension 200 milliGRAM(s) Oral <User Schedule>    Anticoagulation:  heparin   Injectable 5000 Unit(s) SubCutaneous every 8 hours    OTHER:  acetylcysteine 10%  Inhalation 4 milliLiter(s) Inhalation every 6 hours  albuterol/ipratropium for Nebulization 3 milliLiter(s) Nebulizer every 6 hours  amLODIPine   Tablet 5 milliGRAM(s) Oral daily  artificial tears (preservative free) Ophthalmic Solution 1 Drop(s) Right EYE every 4 hours  chlorhexidine 0.12% Liquid 15 milliLiter(s) Oral Mucosa every 12 hours  chlorhexidine 2% Cloths 1 Application(s) Topical <User Schedule>  doxazosin 8 milliGRAM(s) Oral at bedtime  erythromycin   Ointment 1 Application(s) Right EYE at bedtime  fluticasone propionate 50 MICROgram(s)/spray Nasal Spray 1 Spray(s) Both Nostrils two times a day  influenza   Vaccine 0.5 milliLiter(s) IntraMuscular once  ofloxacin 0.3% Solution 1 Drop(s) Right EYE four times a day  pantoprazole   Suspension 40 milliGRAM(s) Oral daily  petrolatum Ophthalmic Ointment 1 Application(s) Both EYES two times a day  propranolol 5 milliGRAM(s) Oral every 12 hours  sodium chloride 0.65% Nasal 1 Spray(s) Both Nostrils two times a day    IVF:    CULTURES:  Culture Results:   >100,000 CFU/ml Klebsiella aerogenes (Previously Enterobacter) (01-27 @ 23:20)  Culture Results:   No growth at 4 days (01-27 @ 22:35)    RADIOLOGY & ADDITIONAL TESTS:      ASSESSMENT:  44M PMH ?stroke/MI present to [facility] after collapsing at work. Decorticate posturing, vomiting, intubated for airway protection. 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 Transferred to [facility] for further management. s/p trach 10/14. s/p peg 10/21. Re-upgrade to ICU 2/2 desaturation event and suctioning requirements 11/15. Re-upgrade to NSICU 12/15 2/2 desaturation and tachycardia.    Neuro:  - neuro/vitals q4h  - pain control: tylenol prn  - seizure tx: keppra 1500mg BID, phenytoin 150/150/200  - s/p vEEG (1/26-1/27)vEEG (10/3-4) negative, (10/17-10/19) negative, (12/17-12/18) negative, (12/22-12/25), (1/26-1/28),  +focal motor seizures not correlating w/ EEG,  - CTH 9/30: enlarged pontine hemorrhage, CTH 10/3: stable, CTH 10/25: mostly resolved pontine hemorrhage, CTH 12/15: R mastoid air cell opacification; acute otitis media vs sterile effusion, CTH 12/18: stable. CTH 1/21 stable, CTH 1/27 stable  - MRI brain 10/12: parenchymal hemorrhage, acute/subacute R cerebellar stroke      CV:  - -160  - tachycardia: propranolol 5mg BID   - HTN: amlodipine 5mg  - echo (9/30) EF 75%, repeat 12/16: 57%    Resp:  - trach to vent, AC/VC 40/400/14/6  - Secretions: duonebs/mucomyst/chest PT q6h     GI:  - TFs via PEG  - bowel regimen held for loose stool, last BM 1/30  - baclofen 5q12 for hiccups    Renal:  - FW 200cc q4h for hydration  - Urinary retenion: Vuong removed 1/7, SC prn  - CKD: trend BUN/Cr  - renal US 10/1, 10/8, 1/15: Increased bilateral renal echogenicity consistent with medical renal disease    Endo:  - A1c 5.4    Heme:  - DVT ppx: SCDs, SQH 5000u q8h   - LE dopplers negative 12/16    ID:  - last pan cx 1/27, +klebsiella UTI on levaquin (1/30-2/3  - empiric PNA: cefepime (12/22-12/30), s/p vanc (12/22-12/23), s/p zosyn (12/15-12/20), s/p vanc (12/15-12/16), s/p zosyn (1/12 -1/18)  - s/p Stenotrophomonas maltophilia PNA: s/p Bactrim (11/18-11/19) s/p Cefepime (11/16-11/18)  - 11/3, (+) sputum for stenotrophomas maltophlia, blood cx (+) klebsiella, cefepime 2gq12 (11/6 - 11/12)   - S/p Ancef (10/4-10/14) for PNA, and s/p Unasyn (10/18-10/23) +actinobacter baumanii     MISC:  - ophtho consult for keratitis  - erythromycin ointment R eye q4hrs, ofloxacin ointment R eye QID, artificial tears R eye q2hrs, moisture chamber at bedtime    Dispo: SDU status, DNR, pending PRUCOL for benefits     D/w Dr. D'Amico

## 2025-02-02 NOTE — PROGRESS NOTE ADULT - ASSESSMENT
44M with unclear PMH (?stroke, MI) who was found down at work, intubated for airway protection and found to have acute parenchymal hemorrhage within nabil with mass effect (+ acute/subacute right cerebellar infarct) in setting of hypertensive emergency, transferred to [facility] for further neurosurgical care. Hospital course c/b poor neurologic recovery s/p trach-PEG, AUR s/p gabriel, ANIKA on CKD c/b hyperkalemia, HAP s/p amp-sulbactam (EOT 10/23), K aerogenes bacteremia  treated with 2 weeks of Cefepime per ID , On 11/15 he became septic and was transferred to NSICU due to increased o2 requirements and needed Vent support , Trach Cultures + for Stenotrophomonas which was treated with 7 days of Bactrim per ID , has been weaned of Vent since 11/23 and is now on 10lts at 40% o2 through Trach Collar. Stepped up to the NSICU on 12/15 for hypoxia and tachycardia. PE ruled out. On abx for 5 days. Unclear etiology. Now stepped down to 8Lach.    # Pontine hemorrhage  # Encephalopathy due to Intracranial Bleed   - Acute parenchymal hemorrhage within nabil with mass effect (+ acute/subacute right cerebellar infarct) in setting of hypertensive emergency.   - MRI brain also demonstrated acute/subacute R cerebellar stroke.   - No Neurosurgical Interventions were performed   - Secondary to IPH and cerebellar stroke patient has become Trach and PEG Dependent    #Klebsiella UTI  -Pt has been afebrile over 24hrs   -Urine cx 1/27 growing Klebsiella   - MRSA nares negative  - Pt was initially started on pseudomonal dosing of zosyn however pt abx were changed to Levaquin on 1/20.    - Will continue Levaquin x5days (EOT: 2/3)    #Hypoxia ( resolved)  -Will continue to monitor respiratory status   -Continue nebulizers     # Seizures  - Continue Seizure precautions   - Continue Keppra and phenytoin  - appreciate input from epilepsy    # Hypertension  - Continue amlodipine 5mg daily with holding parameters, uptitrate regimen as needed    # Chronic respiratory failure.   - s/p percutaneous trach by pulm on 10/14/24  - Currently on Vent Support   - Continue Chest PT    # Neurogenic dysphagia.   - Pt s/p PEG with surgery 10/21/24  - TF per nutrition  - aspiration precautions and elevation of HOB.    #Acute urinary retention.   - doxazosin 8mg  - continue to monitor urine outpatient     # Functional quadriplegia in setting of brainstem hemorrhage  - decub precautions  - care per nursing protocol       55 minutes spent on total encounter. The necessity of the time spent during the encounter on this date of service was due to:    Review of hospital course, labs, vitals, medical records.  Bedside exam and interview of the patient  Discussed plan of care with primary team   Documenting the encounter.

## 2025-02-02 NOTE — PROGRESS NOTE ADULT - SUBJECTIVE AND OBJECTIVE BOX
Patient was seen and examined at bedside. Case discuss with the primary team. Pt with episode of desaturation to 84% yesterday evening. Now resolved Pt is now . Pt w/o additional events overnight.     OBJECTIVE:  Vital Signs Last 24 Hrs  T(C): 36.3 (02 Feb 2025 09:00), Max: 37.1 (01 Feb 2025 14:19)  T(F): 97.3 (02 Feb 2025 09:00), Max: 98.8 (01 Feb 2025 14:19)  HR: 80 (02 Feb 2025 07:55) (78 - 97)  BP: 120/98 (02 Feb 2025 07:55) (120/98 - 134/92)  BP(mean): 106 (02 Feb 2025 07:55) (99 - 108)  RR: 14 (02 Feb 2025 07:55) (14 - 18)  SpO2: 98% (02 Feb 2025 07:55) (95% - 99%)    Parameters below as of 02 Feb 2025 07:55  Patient On (Oxygen Delivery Method): ventilator    O2 Concentration (%): 40      PHYSICAL EXAM:  General: minimally responsive on ventilator, NAD, no labored breathing  HEENT: trach collar in place, clean  Lungs: anterior exam, limited, no crackles, no wheezes  Heart: regular  Abdomen: soft, no distension, + BS  Extremities:  warm, trace edema, not following commands         LABS:                        11.1   8.55  )-----------( 244      ( 02 Feb 2025 05:30 )             35.3     02-02    138  |  100  |  36[H]  ----------------------------<  149[H]  3.5   |  24  |  1.18    Ca    9.4      02 Feb 2025 05:30  Phos  4.1     02-02  Mg     2.1     02-02 2/1 CXR: Tracheostomy again present. Stable cardiomediastinal silhouette. Left   basilar opacity/effusion. Right basilar opacity/effusion, stable to   slightly increased. Cannot exclude bibasilar infiltrate. No pneumothorax.   Degenerative changes of the spine. No acute osseous abnormality.      acetaminophen   Oral Liquid .. 650 milliGRAM(s) Oral every 6 hours PRN  acetylcysteine 10%  Inhalation 4 milliLiter(s) Inhalation every 6 hours  albuterol/ipratropium for Nebulization 3 milliLiter(s) Nebulizer every 6 hours  amLODIPine   Tablet 5 milliGRAM(s) Oral daily  artificial tears (preservative free) Ophthalmic Solution 1 Drop(s) Right EYE every 4 hours  baclofen 5 milliGRAM(s) Oral every 12 hours  chlorhexidine 0.12% Liquid 15 milliLiter(s) Oral Mucosa every 12 hours  chlorhexidine 2% Cloths 1 Application(s) Topical <User Schedule>  doxazosin 8 milliGRAM(s) Oral at bedtime  erythromycin   Ointment 1 Application(s) Right EYE at bedtime  fluticasone propionate 50 MICROgram(s)/spray Nasal Spray 1 Spray(s) Both Nostrils two times a day  heparin   Injectable 5000 Unit(s) SubCutaneous every 8 hours  influenza   Vaccine 0.5 milliLiter(s) IntraMuscular once  levETIRAcetam 1500 milliGRAM(s) Oral every 12 hours  levoFLOXacin  Tablet 750 milliGRAM(s) Oral every 24 hours  LORazepam   Injectable 2 milliGRAM(s) IV Push once PRN  ofloxacin 0.3% Solution 1 Drop(s) Right EYE four times a day  pantoprazole   Suspension 40 milliGRAM(s) Oral daily  petrolatum Ophthalmic Ointment 1 Application(s) Both EYES two times a day  phenytoin   Suspension 150 milliGRAM(s) Oral <User Schedule>  phenytoin   Suspension 200 milliGRAM(s) Oral <User Schedule>  propranolol 5 milliGRAM(s) Oral every 12 hours  sodium chloride 0.65% Nasal 1 Spray(s) Both Nostrils two times a day

## 2025-02-03 PROCEDURE — 99232 SBSQ HOSP IP/OBS MODERATE 35: CPT

## 2025-02-03 PROCEDURE — 99231 SBSQ HOSP IP/OBS SF/LOW 25: CPT

## 2025-02-03 RX ADMIN — Medication 150 MILLIGRAM(S): at 05:15

## 2025-02-03 RX ADMIN — IPRATROPIUM BROMIDE AND ALBUTEROL SULFATE 3 MILLILITER(S): .5; 2.5 SOLUTION RESPIRATORY (INHALATION) at 23:00

## 2025-02-03 RX ADMIN — ACETYLCYSTEINE 4 MILLILITER(S): 200 INHALANT RESPIRATORY (INHALATION) at 05:12

## 2025-02-03 RX ADMIN — OFLOXACIN 1 DROP(S): 3 SOLUTION OPHTHALMIC at 05:12

## 2025-02-03 RX ADMIN — ACETYLCYSTEINE 4 MILLILITER(S): 200 INHALANT RESPIRATORY (INHALATION) at 11:14

## 2025-02-03 RX ADMIN — Medication 1 SPRAY(S): at 05:13

## 2025-02-03 RX ADMIN — IPRATROPIUM BROMIDE AND ALBUTEROL SULFATE 3 MILLILITER(S): .5; 2.5 SOLUTION RESPIRATORY (INHALATION) at 17:14

## 2025-02-03 RX ADMIN — ERYTHROMYCIN 1 APPLICATION(S): 5 OINTMENT OPHTHALMIC at 21:16

## 2025-02-03 RX ADMIN — Medication 1 DROP(S): at 02:07

## 2025-02-03 RX ADMIN — IPRATROPIUM BROMIDE AND ALBUTEROL SULFATE 3 MILLILITER(S): .5; 2.5 SOLUTION RESPIRATORY (INHALATION) at 05:12

## 2025-02-03 RX ADMIN — Medication 1 DROP(S): at 05:13

## 2025-02-03 RX ADMIN — FLUTICASONE PROPIONATE 1 SPRAY(S): 50 SPRAY, METERED NASAL at 05:13

## 2025-02-03 RX ADMIN — HEPARIN SODIUM 5000 UNIT(S): 1000 INJECTION INTRAVENOUS; SUBCUTANEOUS at 13:40

## 2025-02-03 RX ADMIN — FLUTICASONE PROPIONATE 1 SPRAY(S): 50 SPRAY, METERED NASAL at 17:13

## 2025-02-03 RX ADMIN — Medication 200 MILLIGRAM(S): at 19:52

## 2025-02-03 RX ADMIN — IPRATROPIUM BROMIDE AND ALBUTEROL SULFATE 3 MILLILITER(S): .5; 2.5 SOLUTION RESPIRATORY (INHALATION) at 11:13

## 2025-02-03 RX ADMIN — ACETYLCYSTEINE 4 MILLILITER(S): 200 INHALANT RESPIRATORY (INHALATION) at 17:16

## 2025-02-03 RX ADMIN — Medication 1 APPLICATION(S): at 05:15

## 2025-02-03 RX ADMIN — Medication 1 APPLICATION(S): at 05:13

## 2025-02-03 RX ADMIN — OFLOXACIN 1 DROP(S): 3 SOLUTION OPHTHALMIC at 23:00

## 2025-02-03 RX ADMIN — HEPARIN SODIUM 5000 UNIT(S): 1000 INJECTION INTRAVENOUS; SUBCUTANEOUS at 05:12

## 2025-02-03 RX ADMIN — Medication 15 MILLILITER(S): at 05:10

## 2025-02-03 RX ADMIN — Medication 1 APPLICATION(S): at 17:17

## 2025-02-03 RX ADMIN — OFLOXACIN 1 DROP(S): 3 SOLUTION OPHTHALMIC at 17:12

## 2025-02-03 RX ADMIN — Medication 15 MILLILITER(S): at 17:13

## 2025-02-03 RX ADMIN — Medication 40 MILLIGRAM(S): at 11:13

## 2025-02-03 RX ADMIN — ACETYLCYSTEINE 4 MILLILITER(S): 200 INHALANT RESPIRATORY (INHALATION) at 23:00

## 2025-02-03 RX ADMIN — DOXAZOSIN MESYLATE 8 MILLIGRAM(S): 8 TABLET ORAL at 21:16

## 2025-02-03 RX ADMIN — Medication 150 MILLIGRAM(S): at 11:12

## 2025-02-03 RX ADMIN — LEVETIRACETAM 1500 MILLIGRAM(S): 10 INJECTION, SOLUTION INTRAVENOUS at 05:10

## 2025-02-03 RX ADMIN — Medication 1 DROP(S): at 17:16

## 2025-02-03 RX ADMIN — OFLOXACIN 1 DROP(S): 3 SOLUTION OPHTHALMIC at 11:14

## 2025-02-03 RX ADMIN — HEPARIN SODIUM 5000 UNIT(S): 1000 INJECTION INTRAVENOUS; SUBCUTANEOUS at 21:16

## 2025-02-03 RX ADMIN — LEVETIRACETAM 1500 MILLIGRAM(S): 10 INJECTION, SOLUTION INTRAVENOUS at 17:15

## 2025-02-03 RX ADMIN — BACLOFEN 5 MILLIGRAM(S): 10 INJECTION INTRATHECAL at 05:10

## 2025-02-03 RX ADMIN — Medication 1 DROP(S): at 13:39

## 2025-02-03 RX ADMIN — Medication 1 SPRAY(S): at 17:12

## 2025-02-03 RX ADMIN — Medication 1 DROP(S): at 21:17

## 2025-02-03 RX ADMIN — BACLOFEN 5 MILLIGRAM(S): 10 INJECTION INTRATHECAL at 17:14

## 2025-02-03 RX ADMIN — Medication 1 DROP(S): at 09:42

## 2025-02-03 NOTE — PROGRESS NOTE ADULT - SUBJECTIVE AND OBJECTIVE BOX
HPI:  Unknown age male, unknown past medical history (reported stroke and MI by coworkers) presented to [facility] with AMS, Pt was working at [employer] when he was found down by coworkers. EMS called and pt brought to [facility] ED. Intubated, sedated, started on cardene for SBPs in 200s. CT head showed brain stem bleed. Transferred to NSICU for further management.  (30 Sep 2024 12:55)    OVERNIGHT EVENTS: Seen and evaluated at bedside, no events.     Hospital Course: Entire course available in provider handoff tab  1/21: GEORGE ovn, in AM having rapid vertical eye movements with facial twitching, 2g total keppra given for AM dose and phenytoin level ordered, vital signs stable. CTH stable. Phenytoin increased to 100/100/150mg per epilepsy  1/22: GEORGE ovn. IV hydral x 1 for SBP 170s.   1/23: Cont to have focal motor seizures. Per epilepsy, changed phenytoin dose to 100/100/200.   1/24: Phenytoin lvl tmrw AM. Chest Xray completed due to temp 100.2F  1/25: GEORGE overnight. Incr dilantin morning and afternoon per epilepsy.   1/26: GEORGE overnight. Sustained focal twitching noticed by nursing. Ativan 8mg in total given. Fosphenytoin 1500mg IV given. Placed on EEG. Epilepsy following. TFs held. Phenytoin increased to 150/150/200.   1/27: o/n CTH completed due to continued lethargy after ativan given yesterday afternoon. TFs resumed. 500cc bolus NS given for SBP 90s. EEG dc'ed, discussed w/ Dr. Tomlin. Febrile 101F, pan cx sent. Likely left sided infiltrate on CXR, c/f aspiration PNA. Started on vanc/zosyn. MRSA swab pending.   1/28: Neuro stable, on vanc/zosyn. +UTI on UA, MRSA negative. Vanc dc'd. RVP negative. Zosyn increased to pseudomonal dosing.   1/29: GEORGE overnight, neuro stable.  1/30: GEORGE overnight, neuro stable. Dc'd zosyn due to resistance, switched to Levaquin.  1/31: GEORGE ovenight, neuro stable.   2/1: Brief desat. Improved with neb and reposititioning. ABG and POCUS wnl.     Vital Signs Last 24 Hrs  T(C): 37 (02 Feb 2025 21:38), Max: 37 (02 Feb 2025 21:38)  T(F): 98.6 (02 Feb 2025 21:38), Max: 98.6 (02 Feb 2025 21:38)  HR: 80 (02 Feb 2025 23:35) (76 - 96)  BP: 105/72 (02 Feb 2025 23:35) (105/72 - 130/86)  BP(mean): 84 (02 Feb 2025 23:35) (84 - 106)  RR: 17 (02 Feb 2025 23:35) (14 - 18)  SpO2: 98% (02 Feb 2025 23:35) (95% - 99%)    Parameters below as of 02 Feb 2025 23:59  Patient On (Oxygen Delivery Method): ventilator    I&O's Summary    01 Feb 2025 07:01  -  02 Feb 2025 07:00  --------------------------------------------------------  IN: 1700 mL / OUT: 1830 mL / NET: -130 mL    02 Feb 2025 07:01  -  03 Feb 2025 00:17  --------------------------------------------------------  IN: 1700 mL / OUT: 1055 mL / NET: 645 mL    PHYSICAL EXAM:  Constitutional: NAD.   HEENT: Pupils 4mm, equal, round, reactive to light.  Respiratory: +trach to vent. No respiratory distress, lungs clear to auscultation bilaterally.   Cardiovascular: RRR, S1, S2.   Gastrointestinal: +PEG. Abdomen soft, non tender, nondistended.  Neurological:  AAOX0. Opens eyes spontaneously. does not track, nonverbal  Motor: RUE squeezes hand, RLE wiggles toes, LUE 0/5, LLE w/d to noxious  Sensation: unable to assess  Extremities: Warm, well perfused.    TUBES/LINES:  [] Vuong  [] Lumbar Drain  [] Wound Drains  [] Others    DIET:  [] NPO  [] Mechanical  [x] Tube feeds    LABS:                        11.1   8.55  )-----------( 244      ( 02 Feb 2025 05:30 )             35.3     02-02    138  |  100  |  36[H]  ----------------------------<  149[H]  3.5   |  24  |  1.18    Ca    9.4      02 Feb 2025 05:30  Phos  4.1     02-02  Mg     2.1     02-02        Urinalysis Basic - ( 02 Feb 2025 05:30 )    Color: x / Appearance: x / SG: x / pH: x  Gluc: 149 mg/dL / Ketone: x  / Bili: x / Urobili: x   Blood: x / Protein: x / Nitrite: x   Leuk Esterase: x / RBC: x / WBC x   Sq Epi: x / Non Sq Epi: x / Bacteria: x          CAPILLARY BLOOD GLUCOSE          Drug Levels: [] N/A  Phenytoin Level, Serum: 16.9 ug/mL (01-28 @ 10:41)  Phenytoin Level, Serum: 18.2 ug/mL (01-27 @ 05:30)    CSF Analysis: [] N/A      Allergies    Allergy Status Unknown    Intolerances      MEDICATIONS:  Antibiotics:  levoFLOXacin  Tablet 750 milliGRAM(s) Oral every 24 hours    Neuro:  acetaminophen   Oral Liquid .. 650 milliGRAM(s) Oral every 6 hours PRN  baclofen 5 milliGRAM(s) Oral every 12 hours  levETIRAcetam 1500 milliGRAM(s) Oral every 12 hours  LORazepam   Injectable 2 milliGRAM(s) IV Push once PRN  phenytoin   Suspension 150 milliGRAM(s) Oral <User Schedule>  phenytoin   Suspension 200 milliGRAM(s) Oral <User Schedule>    Anticoagulation:  heparin   Injectable 5000 Unit(s) SubCutaneous every 8 hours    OTHER:  acetylcysteine 10%  Inhalation 4 milliLiter(s) Inhalation every 6 hours  albuterol/ipratropium for Nebulization 3 milliLiter(s) Nebulizer every 6 hours  amLODIPine   Tablet 5 milliGRAM(s) Oral daily  artificial tears (preservative free) Ophthalmic Solution 1 Drop(s) Right EYE every 4 hours  chlorhexidine 0.12% Liquid 15 milliLiter(s) Oral Mucosa every 12 hours  chlorhexidine 2% Cloths 1 Application(s) Topical <User Schedule>  doxazosin 8 milliGRAM(s) Oral at bedtime  erythromycin   Ointment 1 Application(s) Right EYE at bedtime  fluticasone propionate 50 MICROgram(s)/spray Nasal Spray 1 Spray(s) Both Nostrils two times a day  influenza   Vaccine 0.5 milliLiter(s) IntraMuscular once  ofloxacin 0.3% Solution 1 Drop(s) Right EYE four times a day  pantoprazole   Suspension 40 milliGRAM(s) Oral daily  petrolatum Ophthalmic Ointment 1 Application(s) Both EYES two times a day  propranolol 5 milliGRAM(s) Oral every 12 hours  sodium chloride 0.65% Nasal 1 Spray(s) Both Nostrils two times a day    IVF:    CULTURES:  Culture Results:   >100,000 CFU/ml Klebsiella aerogenes (Previously Enterobacter) (01-27 @ 23:20)  Culture Results:   No growth at 5 days (01-27 @ 22:35)    RADIOLOGY & ADDITIONAL TESTS:      ASSESSMENT:  44M PMH ?stroke/MI present to [facility] after collapsing at work. Decorticate posturing, vomiting, intubated for airway protection. 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 Transferred to [facility] for further management. s/p trach 10/14. s/p peg 10/21. Re-upgrade to ICU 2/2 desaturation event and suctioning requirements 11/15. Re-upgrade to NSICU 12/15 2/2 desaturation and tachycardia.    Neuro:  - neuro/vitals q4h  - pain control: tylenol prn  - seizure tx: keppra 1500mg BID, phenytoin 150/150/200  - s/p vEEG (1/26-1/27)vEEG (10/3-4) negative, (10/17-10/19) negative, (12/17-12/18) negative, (12/22-12/25), (1/26-1/28),  +focal motor seizures not correlating w/ EEG,  - CTH 9/30: enlarged pontine hemorrhage, CTH 10/3: stable, CTH 10/25: mostly resolved pontine hemorrhage, CTH 12/15: R mastoid air cell opacification; acute otitis media vs sterile effusion, CTH 12/18: stable. CTH 1/21 stable, CTH 1/27 stable  - MRI brain 10/12: parenchymal hemorrhage, acute/subacute R cerebellar stroke      CV:  - -160  - tachycardia: propranolol 5mg BID   - HTN: amlodipine 5mg  - echo (9/30) EF 75%, repeat 12/16: 57%    Resp:  - trach to vent, AC/VC 40/400/14/6  - Secretions: duonebs/mucomyst/chest PT q6h     GI:  - TFs via PEG  - bowel regimen held for loose stool, last BM 1/30  - baclofen 5q12 for hiccups    Renal:  - FW 200cc q4h for hydration  - Urinary retenion: Vuong removed 1/7, SC prn  - CKD: trend BUN/Cr  - renal US 10/1, 10/8, 1/15: Increased bilateral renal echogenicity consistent with medical renal disease    Endo:  - A1c 5.4    Heme:  - DVT ppx: SCDs, SQH 5000u q8h   - LE dopplers negative 12/16    ID:  - last pan cx 1/27, +klebsiella UTI on levaquin (1/30-2/3  - empiric PNA: cefepime (12/22-12/30), s/p vanc (12/22-12/23), s/p zosyn (12/15-12/20), s/p vanc (12/15-12/16), s/p zosyn (1/12 -1/18)  - s/p Stenotrophomonas maltophilia PNA: s/p Bactrim (11/18-11/19) s/p Cefepime (11/16-11/18)  - 11/3, (+) sputum for stenotrophomas maltophlia, blood cx (+) klebsiella, cefepime 2gq12 (11/6 - 11/12)   - S/p Ancef (10/4-10/14) for PNA, and s/p Unasyn (10/18-10/23) +actinobacter baumanii     MISC:  - ophtho consult for keratitis  - erythromycin ointment R eye q4hrs, ofloxacin ointment R eye QID, artificial tears R eye q2hrs, moisture chamber at bedtime    Dispo: SDU status, DNR, pending PRUCOL for benefits     D/w Dr. D'Amico

## 2025-02-03 NOTE — PROGRESS NOTE ADULT - SUBJECTIVE AND OBJECTIVE BOX
No events overnight.        OVERNIGHT EVENTS: NAEO      Remaining ROS negative       PHYSICAL EXAM:  General: NAD, on vent through trach, breathing is nonlabored  HEENT: trach collar in place  Lungs: clear to auscultation anteriorly  Heart: RRR, normal s1s2  Abdomen: soft, no distension, + BS, PEG tube in place, site is clean and dry   Extremities:  warm, wwp     VITAL SIGNS:  Vital Signs Last 24 Hrs  T(C): 36.7 (03 Feb 2025 09:02), Max: 37 (02 Feb 2025 21:38)  T(F): 98.1 (03 Feb 2025 09:02), Max: 98.6 (02 Feb 2025 21:38)  HR: 80 (03 Feb 2025 12:34) (74 - 80)  BP: 124/80 (03 Feb 2025 12:34) (101/67 - 130/86)  BP(mean): 98 (03 Feb 2025 12:34) (80 - 103)  RR: 14 (03 Feb 2025 12:34) (14 - 17)  SpO2: 98% (03 Feb 2025 12:34) (97% - 99%)    Parameters below as of 03 Feb 2025 12:34  Patient On (Oxygen Delivery Method): ventilator    O2 Concentration (%): 40      MEDICATIONS:  MEDICATIONS  (STANDING):  acetylcysteine 10%  Inhalation 4 milliLiter(s) Inhalation every 6 hours  albuterol/ipratropium for Nebulization 3 milliLiter(s) Nebulizer every 6 hours  amLODIPine   Tablet 5 milliGRAM(s) Oral daily  artificial tears (preservative free) Ophthalmic Solution 1 Drop(s) Right EYE every 4 hours  baclofen 5 milliGRAM(s) Oral every 12 hours  chlorhexidine 0.12% Liquid 15 milliLiter(s) Oral Mucosa every 12 hours  chlorhexidine 2% Cloths 1 Application(s) Topical <User Schedule>  doxazosin 8 milliGRAM(s) Oral at bedtime  erythromycin   Ointment 1 Application(s) Right EYE at bedtime  fluticasone propionate 50 MICROgram(s)/spray Nasal Spray 1 Spray(s) Both Nostrils two times a day  heparin   Injectable 5000 Unit(s) SubCutaneous every 8 hours  influenza   Vaccine 0.5 milliLiter(s) IntraMuscular once  levETIRAcetam 1500 milliGRAM(s) Oral every 12 hours  levoFLOXacin  Tablet 750 milliGRAM(s) Oral every 24 hours  ofloxacin 0.3% Solution 1 Drop(s) Right EYE four times a day  pantoprazole   Suspension 40 milliGRAM(s) Oral daily  petrolatum Ophthalmic Ointment 1 Application(s) Both EYES two times a day  phenytoin   Suspension 150 milliGRAM(s) Oral <User Schedule>  phenytoin   Suspension 200 milliGRAM(s) Oral <User Schedule>  propranolol 5 milliGRAM(s) Oral every 12 hours  sodium chloride 0.65% Nasal 1 Spray(s) Both Nostrils two times a day    MEDICATIONS  (PRN):  acetaminophen   Oral Liquid .. 650 milliGRAM(s) Oral every 6 hours PRN Temp greater or equal to 38C (100.4F), Mild Pain (1 - 3)  LORazepam   Injectable 2 milliGRAM(s) IV Push once PRN Seizure Activity (NOTIFY PROVIDER PRIOR TO GIVING)      ALLERGIES:  Allergies    Allergy Status Unknown    Intolerances        LABS:                        11.1   8.55  )-----------( 244      ( 02 Feb 2025 05:30 )             35.3     02-02    138  |  100  |  36[H]  ----------------------------<  149[H]  3.5   |  24  |  1.18    Ca    9.4      02 Feb 2025 05:30  Phos  4.1     02-02  Mg     2.1     02-02        Urinalysis Basic - ( 02 Feb 2025 05:30 )    Color: x / Appearance: x / SG: x / pH: x  Gluc: 149 mg/dL / Ketone: x  / Bili: x / Urobili: x   Blood: x / Protein: x / Nitrite: x   Leuk Esterase: x / RBC: x / WBC x   Sq Epi: x / Non Sq Epi: x / Bacteria: x      CAPILLARY BLOOD GLUCOSE          RADIOLOGY & ADDITIONAL TESTS: Reviewed.

## 2025-02-03 NOTE — PROGRESS NOTE ADULT - ASSESSMENT
44M with unclear PMH (?stroke, MI) who was found down at work, intubated for airway protection and found to have acute parenchymal hemorrhage within nabil with mass effect (+ acute/subacute right cerebellar infarct) in setting of hypertensive emergency, transferred to [facility] for further neurosurgical care. Hospital course c/b poor neurologic recovery s/p trach-PEG, AUR s/p gabriel, ANIKA on CKD c/b hyperkalemia, HAP s/p amp-sulbactam (EOT 10/23), K aerogenes bacteremia  treated with 2 weeks of Cefepime per ID , On 11/15 he became septic and was transferred to NSICU due to increased o2 requirements and needed Vent support , Trach Cultures + for Stenotrophomonas which was treated with 7 days of Bactrim per ID , has been weaned of Vent since 11/23 and is now on 10lts at 40% o2 through Trach Collar. Stepped up to the NSICU on 12/15 for hypoxia and tachycardia. PE ruled out. On abx for 5 days. Unclear etiology. Now stepped down to 8Lach.    # Pontine hemorrhage  # Encephalopathy due to Intracranial Bleed   - Acute parenchymal hemorrhage within nabil with mass effect (+ acute/subacute right cerebellar infarct) in setting of hypertensive emergency.   - MRI brain also demonstrated acute/subacute R cerebellar stroke.   - No Neurosurgical Interventions were performed   - Secondary to IPH and cerebellar stroke - Trach and PEG Dependent    #Klebsiella UTI  -remains afebrile  -Urine cx 1/27 growing Klebsiella   - MRSA nares negative  - Pt was initially started on pseudomonal dosing of zosyn however pt abx were changed to Levaquin on 1/20.    - Will continue Levaquin x5days (EOT: 2/3)    #Hypoxia ( resolved)  - respiratory state stable at present  - continue to monitor    # Seizures  - Continue Seizure precautions   - Continue Keppra and phenytoin - appreciate further recs from epilepsy    # Hypertension  - amlodipine 5mg daily with holding parameters, uptitrate regimen as needed    # Chronic respiratory failure.   - s/p percutaneous trach by pulm on 10/14/24  - Currently on Vent Support   - Continue Chest PT    # Neurogenic dysphagia.   - Pt s/p PEG with surgery 10/21/24  - TF per nutrition  - aspiration precautions and elevation of HOB.    #Acute urinary retention.   - doxazosin 8mg  - monitor urine output    # Functional quadriplegia in setting of brainstem hemorrhage  - decub precautions  - care per nursing protocol     35 minutes spent on this encounter, including face to face with patient, review of chart, care coordination and documentation.  plan discussed with neurosurgery team.

## 2025-02-04 LAB
ANION GAP SERPL CALC-SCNC: 14 MMOL/L — SIGNIFICANT CHANGE UP (ref 5–17)
BUN SERPL-MCNC: 45 MG/DL — HIGH (ref 7–23)
CALCIUM SERPL-MCNC: 9.1 MG/DL — SIGNIFICANT CHANGE UP (ref 8.4–10.5)
CHLORIDE SERPL-SCNC: 102 MMOL/L — SIGNIFICANT CHANGE UP (ref 96–108)
CO2 SERPL-SCNC: 23 MMOL/L — SIGNIFICANT CHANGE UP (ref 22–31)
CREAT SERPL-MCNC: 1.23 MG/DL — SIGNIFICANT CHANGE UP (ref 0.5–1.3)
EGFR: 73 ML/MIN/1.73M2 — SIGNIFICANT CHANGE UP
EGFR: 73 ML/MIN/1.73M2 — SIGNIFICANT CHANGE UP
GLUCOSE SERPL-MCNC: 125 MG/DL — HIGH (ref 70–99)
HCT VFR BLD CALC: 34.6 % — LOW (ref 39–50)
HGB BLD-MCNC: 10.9 G/DL — LOW (ref 13–17)
MAGNESIUM SERPL-MCNC: 2.2 MG/DL — SIGNIFICANT CHANGE UP (ref 1.6–2.6)
MCHC RBC-ENTMCNC: 30.3 PG — SIGNIFICANT CHANGE UP (ref 27–34)
MCHC RBC-ENTMCNC: 31.5 G/DL — LOW (ref 32–36)
MCV RBC AUTO: 96.1 FL — SIGNIFICANT CHANGE UP (ref 80–100)
NRBC # BLD: 0 /100 WBCS — SIGNIFICANT CHANGE UP (ref 0–0)
NRBC BLD-RTO: 0 /100 WBCS — SIGNIFICANT CHANGE UP (ref 0–0)
PHOSPHATE SERPL-MCNC: 3.9 MG/DL — SIGNIFICANT CHANGE UP (ref 2.5–4.5)
PLATELET # BLD AUTO: 241 K/UL — SIGNIFICANT CHANGE UP (ref 150–400)
POTASSIUM SERPL-MCNC: 3.9 MMOL/L — SIGNIFICANT CHANGE UP (ref 3.5–5.3)
POTASSIUM SERPL-SCNC: 3.9 MMOL/L — SIGNIFICANT CHANGE UP (ref 3.5–5.3)
RBC # BLD: 3.6 M/UL — LOW (ref 4.2–5.8)
RBC # FLD: 13.2 % — SIGNIFICANT CHANGE UP (ref 10.3–14.5)
SODIUM SERPL-SCNC: 139 MMOL/L — SIGNIFICANT CHANGE UP (ref 135–145)
WBC # BLD: 6.82 K/UL — SIGNIFICANT CHANGE UP (ref 3.8–10.5)
WBC # FLD AUTO: 6.82 K/UL — SIGNIFICANT CHANGE UP (ref 3.8–10.5)

## 2025-02-04 PROCEDURE — 99231 SBSQ HOSP IP/OBS SF/LOW 25: CPT

## 2025-02-04 PROCEDURE — 99233 SBSQ HOSP IP/OBS HIGH 50: CPT

## 2025-02-04 RX ADMIN — Medication 1 DROP(S): at 02:00

## 2025-02-04 RX ADMIN — Medication 1 DROP(S): at 05:30

## 2025-02-04 RX ADMIN — LEVETIRACETAM 1500 MILLIGRAM(S): 10 INJECTION, SOLUTION INTRAVENOUS at 17:31

## 2025-02-04 RX ADMIN — IPRATROPIUM BROMIDE AND ALBUTEROL SULFATE 3 MILLILITER(S): .5; 2.5 SOLUTION RESPIRATORY (INHALATION) at 17:21

## 2025-02-04 RX ADMIN — ACETYLCYSTEINE 4 MILLILITER(S): 200 INHALANT RESPIRATORY (INHALATION) at 17:19

## 2025-02-04 RX ADMIN — BACLOFEN 5 MILLIGRAM(S): 10 INJECTION INTRATHECAL at 17:20

## 2025-02-04 RX ADMIN — OFLOXACIN 1 DROP(S): 3 SOLUTION OPHTHALMIC at 05:22

## 2025-02-04 RX ADMIN — Medication 1 SPRAY(S): at 05:22

## 2025-02-04 RX ADMIN — Medication 150 MILLIGRAM(S): at 12:33

## 2025-02-04 RX ADMIN — Medication 1 DROP(S): at 21:18

## 2025-02-04 RX ADMIN — ERYTHROMYCIN 1 APPLICATION(S): 5 OINTMENT OPHTHALMIC at 21:19

## 2025-02-04 RX ADMIN — Medication 1 DROP(S): at 10:14

## 2025-02-04 RX ADMIN — Medication 15 MILLILITER(S): at 05:28

## 2025-02-04 RX ADMIN — IPRATROPIUM BROMIDE AND ALBUTEROL SULFATE 3 MILLILITER(S): .5; 2.5 SOLUTION RESPIRATORY (INHALATION) at 05:28

## 2025-02-04 RX ADMIN — Medication 1 APPLICATION(S): at 05:23

## 2025-02-04 RX ADMIN — OFLOXACIN 1 DROP(S): 3 SOLUTION OPHTHALMIC at 17:18

## 2025-02-04 RX ADMIN — HEPARIN SODIUM 5000 UNIT(S): 1000 INJECTION INTRAVENOUS; SUBCUTANEOUS at 14:09

## 2025-02-04 RX ADMIN — OFLOXACIN 1 DROP(S): 3 SOLUTION OPHTHALMIC at 11:57

## 2025-02-04 RX ADMIN — Medication 40 MILLIGRAM(S): at 11:57

## 2025-02-04 RX ADMIN — HEPARIN SODIUM 5000 UNIT(S): 1000 INJECTION INTRAVENOUS; SUBCUTANEOUS at 05:28

## 2025-02-04 RX ADMIN — ACETYLCYSTEINE 4 MILLILITER(S): 200 INHALANT RESPIRATORY (INHALATION) at 11:57

## 2025-02-04 RX ADMIN — Medication 15 MILLILITER(S): at 17:21

## 2025-02-04 RX ADMIN — IPRATROPIUM BROMIDE AND ALBUTEROL SULFATE 3 MILLILITER(S): .5; 2.5 SOLUTION RESPIRATORY (INHALATION) at 11:57

## 2025-02-04 RX ADMIN — Medication 200 MILLIGRAM(S): at 20:09

## 2025-02-04 RX ADMIN — LEVETIRACETAM 1500 MILLIGRAM(S): 10 INJECTION, SOLUTION INTRAVENOUS at 05:28

## 2025-02-04 RX ADMIN — FLUTICASONE PROPIONATE 1 SPRAY(S): 50 SPRAY, METERED NASAL at 17:17

## 2025-02-04 RX ADMIN — AMLODIPINE BESYLATE 5 MILLIGRAM(S): 10 TABLET ORAL at 05:28

## 2025-02-04 RX ADMIN — DOXAZOSIN MESYLATE 8 MILLIGRAM(S): 8 TABLET ORAL at 21:19

## 2025-02-04 RX ADMIN — FLUTICASONE PROPIONATE 1 SPRAY(S): 50 SPRAY, METERED NASAL at 05:22

## 2025-02-04 RX ADMIN — IPRATROPIUM BROMIDE AND ALBUTEROL SULFATE 3 MILLILITER(S): .5; 2.5 SOLUTION RESPIRATORY (INHALATION) at 23:22

## 2025-02-04 RX ADMIN — Medication 20 MILLIEQUIVALENT(S): at 07:23

## 2025-02-04 RX ADMIN — OFLOXACIN 1 DROP(S): 3 SOLUTION OPHTHALMIC at 23:22

## 2025-02-04 RX ADMIN — Medication 1 APPLICATION(S): at 17:56

## 2025-02-04 RX ADMIN — ACETYLCYSTEINE 4 MILLILITER(S): 200 INHALANT RESPIRATORY (INHALATION) at 23:22

## 2025-02-04 RX ADMIN — BACLOFEN 5 MILLIGRAM(S): 10 INJECTION INTRATHECAL at 05:28

## 2025-02-04 RX ADMIN — Medication 1 SPRAY(S): at 17:21

## 2025-02-04 RX ADMIN — Medication 1 DROP(S): at 14:10

## 2025-02-04 RX ADMIN — HEPARIN SODIUM 5000 UNIT(S): 1000 INJECTION INTRAVENOUS; SUBCUTANEOUS at 21:19

## 2025-02-04 RX ADMIN — Medication 1 APPLICATION(S): at 05:29

## 2025-02-04 RX ADMIN — Medication 150 MILLIGRAM(S): at 05:27

## 2025-02-04 RX ADMIN — Medication 1 DROP(S): at 17:18

## 2025-02-04 RX ADMIN — ACETYLCYSTEINE 4 MILLILITER(S): 200 INHALANT RESPIRATORY (INHALATION) at 05:28

## 2025-02-04 NOTE — CHART NOTE - NSCHARTNOTEFT_GEN_A_CORE
Admitting Diagnosis:   Patient is a 44y old  Male who presents with a chief complaint of brain stem bleed (04 Feb 2025 13:28)  PAST MEDICAL & SURGICAL HISTORY:  Acute myocardial infarction  CVA (cerebral vascular accident)      Current Nutrition Order:   Diet, NPO with Tube Feed:   Tube Feeding Modality: Gastrostomy  LawbitDocs Renal Support 1.8  Total Volume for 24 Hours (mL): 1080  Total Number of Cans: 5  Continuous  Starting Tube Feed Rate {mL per Hour}: 30  Increase Tube Feed Rate by (mL): 10     Every 4 hours  Until Goal Tube Feed Rate (mL per Hour): 60  Tube Feed Duration (in Hours): 18  Tube Feed Start Time: 10:00  Tube Feed Stop Time: 04:00  Banatrol TF     Qty per Day:  2 (12-18-24 @ 09:33) [Active]     PO Intake: N/A... remains NPO with tube feeds as primary source of nutrition (tube feeds running at goal rate)    GI Issues: no noted GI upset or tube feeding tolerance issues; last BM 2/3/25, fecal incontinence noted    Pain: no nonverbal pain/discomfort noted    Skin Integrity: no pressure ulcers, noted with generalized, trace edema; right clavicle skin tear noted per nursing documentation; PEG remains present; Mookie: 11      02-03-25 @ 07:01  -  02-04-25 @ 07:00  --------------------------------------------------------  IN: 1680 mL / OUT: 1685 mL / NET: -5 mL    02-04-25 @ 07:01  -  02-04-25 @ 14:49  --------------------------------------------------------  IN: 440 mL / OUT: 100 mL / NET: 340 mL      Labs:   02-04    139  |  102  |  45[H]  ----------------------------<  125[H]  3.9   |  23  |  1.23    Ca    9.1      04 Feb 2025 06:30  Phos  3.9     02-04  Mg     2.2     02-04    CAPILLARY BLOOD GLUCOSE    Medications:  MEDICATIONS  (STANDING):  acetylcysteine 10%  Inhalation 4 milliLiter(s) Inhalation every 6 hours  albuterol/ipratropium for Nebulization 3 milliLiter(s) Nebulizer every 6 hours  amLODIPine   Tablet 5 milliGRAM(s) Oral daily  artificial tears (preservative free) Ophthalmic Solution 1 Drop(s) Right EYE every 4 hours  baclofen 5 milliGRAM(s) Oral every 12 hours  chlorhexidine 0.12% Liquid 15 milliLiter(s) Oral Mucosa every 12 hours  chlorhexidine 2% Cloths 1 Application(s) Topical <User Schedule>  doxazosin 8 milliGRAM(s) Oral at bedtime  erythromycin   Ointment 1 Application(s) Right EYE at bedtime  fluticasone propionate 50 MICROgram(s)/spray Nasal Spray 1 Spray(s) Both Nostrils two times a day  heparin   Injectable 5000 Unit(s) SubCutaneous every 8 hours  influenza   Vaccine 0.5 milliLiter(s) IntraMuscular once  levETIRAcetam 1500 milliGRAM(s) Oral every 12 hours  ofloxacin 0.3% Solution 1 Drop(s) Right EYE four times a day  pantoprazole   Suspension 40 milliGRAM(s) Oral daily  petrolatum Ophthalmic Ointment 1 Application(s) Both EYES two times a day  phenytoin   Suspension 150 milliGRAM(s) Oral <User Schedule>  phenytoin   Suspension 200 milliGRAM(s) Oral <User Schedule>  propranolol 5 milliGRAM(s) Oral every 12 hours  sodium chloride 0.65% Nasal 1 Spray(s) Both Nostrils two times a day    MEDICATIONS  (PRN):  acetaminophen   Oral Liquid .. 650 milliGRAM(s) Oral every 6 hours PRN Temp greater or equal to 38C (100.4F), Mild Pain (1 - 3)  LORazepam   Injectable 2 milliGRAM(s) IV Push once PRN Seizure Activity (NOTIFY PROVIDER PRIOR TO GIVING)    Dosing Anthropometrics:   Height for BMI (FEET)	5 Feet  Height for BMI (INCHES)	8 Inch(s)  Height for BMI (CM)	172.72 Centimeter(s)  Weight for BMI (lbs)	176.4 lb  Weight for BMI (kg)	80 kg  Body Mass Index	              26.8  ideal body weight:               154 pounds / 70 kg   % ideal body weight             114%     Weight Change: No new wt obtained since admit per nursing documentation, strongly recommend nursing to obtain new wt to track/trend changes.   RD obtained bedweight today (1/23/25) of ~79.5kg, consistent with admission weight. RD to follow up with nursing.   RD obtained bedweight today 2/4/25) of ~76.1kg, consistent with admission weight. RD to follow up with nursing.     Estimated energy needs:  Weight used for calculations: most recent actual body weight  weight: 76.1kg (~167%)  calories: 25-30 calories/kg = ~3355-9616 calories/day  protein: 1.2-1.4g protein/kg = ~91-107g protein/day  fluids: 1mL/kcal = ~1903-2283mL/day  Other Calculations	Pt is ~% ideal body weight (~109%), thus actual body weight used for all calculations. Needs adjusted for age, clinical course.     Subjective: This is a 44 year old male, unknown past medical history (reported stroke and MI by coworkers) presented to [facility] with AMS, Pt was working at [employer] when he was found down by coworkers. EMS called and pt brought to [facility] ED. Intubated, sedated, started on cardene for SBPs in 200s. CT head showed brain stem bleed. Transferred to NSICU for further management.    Patient seen at bedside on [facility] for nutrition follow up. RD spoke to nursing on the unit, pt was resting in bed. Current diet order: NPO with tube feeds of Lawbit[employer]s Renal Support, at goal rate of 60 cc/hr x18 hrs (providing ~ 1944 kcal, 86g protein, 713 mL free water), meeting 100% estimated nutrient needs. No tube feeding tolerance issues noted. GI within normal limits at this time as per flowsheets, fecal incontinence noted, BM on 2/3/25. Labs: BUN elevated (45), electrolytes such as sodium, potassium, phosphorus, magnesium are within normal limits; eGFR is within normal ranges; medical team is aware, RD to monitor trends. Medications reviewed as above. RD to follow up and speak with medical team to see if there is still a concern for renal function; if not, alternative formula option can be explored for this pt during admission and for discharge if pt is discharged with tube feedings. RD will continue to follow, please see nutrition recommendations below.     Previous Nutrition Diagnosis: Inadequate Oral Intake related to inability to meet nutritional demands via PO as evidenced by NPO with tube feedings    Active [ x ]  Resolved [   ]    Goal: Pt will consume >/= 75% of estimated nutrient needs via tolerated route     Nutrition Recommendations:  1. Consider the following regimen if renal formula is no longer necessary or warranted: if team would like to keep the same brand, consider Lawbit[employer]s Standard 1.4 via gastrostomy: GOAL of   **Provide Banatrol Tube Feeding prn**  **Maintain aspiration precautions at all times; monitor for signs/symptoms of intolerance**  2. Monitor weights (weekly), GI function, skin integrity; electrolytes, BMP, glucose, CBC per MD discretion *renal indices*  3. Pain and bowel regimen per medical team  4. Align nutrition with goals of care at all times  5. Recommend nursing to obtain new weights to track/trend changes    Education: deferred at this time related to pt resting in bed, and cognitive status.     Risk Level: High [   ] Moderate [ x ] Low [   ].    Jessica Zamarripa, NICHOLEN, CDN, ext [phone], also available via TEAMS Admitting Diagnosis:   Patient is a 44y old  Male who presents with a chief complaint of brain stem bleed (04 Feb 2025 13:28)  PAST MEDICAL & SURGICAL HISTORY:  Acute myocardial infarction  CVA (cerebral vascular accident)      Current Nutrition Order:   Diet, NPO with Tube Feed:   Tube Feeding Modality: Gastrostomy  i-marker Renal Support 1.8  Total Volume for 24 Hours (mL): 1080  Total Number of Cans: 5  Continuous  Starting Tube Feed Rate {mL per Hour}: 30  Increase Tube Feed Rate by (mL): 10     Every 4 hours  Until Goal Tube Feed Rate (mL per Hour): 60  Tube Feed Duration (in Hours): 18  Tube Feed Start Time: 10:00  Tube Feed Stop Time: 04:00  Banatrol TF     Qty per Day:  2 (12-18-24 @ 09:33) [Active]     PO Intake: N/A... remains NPO with tube feeds as primary source of nutrition (tube feeds running at goal rate)    GI Issues: no noted GI upset or tube feeding tolerance issues; last BM 2/3/25, fecal incontinence noted    Pain: no nonverbal pain/discomfort noted    Skin Integrity: no pressure ulcers, noted with generalized, trace edema; right clavicle skin tear noted per nursing documentation; PEG remains present; Mookie: 11      02-03-25 @ 07:01  -  02-04-25 @ 07:00  --------------------------------------------------------  IN: 1680 mL / OUT: 1685 mL / NET: -5 mL    02-04-25 @ 07:01  -  02-04-25 @ 14:49  --------------------------------------------------------  IN: 440 mL / OUT: 100 mL / NET: 340 mL      Labs:   02-04    139  |  102  |  45[H]  ----------------------------<  125[H]  3.9   |  23  |  1.23    Ca    9.1      04 Feb 2025 06:30  Phos  3.9     02-04  Mg     2.2     02-04    CAPILLARY BLOOD GLUCOSE    Medications:  MEDICATIONS  (STANDING):  acetylcysteine 10%  Inhalation 4 milliLiter(s) Inhalation every 6 hours  albuterol/ipratropium for Nebulization 3 milliLiter(s) Nebulizer every 6 hours  amLODIPine   Tablet 5 milliGRAM(s) Oral daily  artificial tears (preservative free) Ophthalmic Solution 1 Drop(s) Right EYE every 4 hours  baclofen 5 milliGRAM(s) Oral every 12 hours  chlorhexidine 0.12% Liquid 15 milliLiter(s) Oral Mucosa every 12 hours  chlorhexidine 2% Cloths 1 Application(s) Topical <User Schedule>  doxazosin 8 milliGRAM(s) Oral at bedtime  erythromycin   Ointment 1 Application(s) Right EYE at bedtime  fluticasone propionate 50 MICROgram(s)/spray Nasal Spray 1 Spray(s) Both Nostrils two times a day  heparin   Injectable 5000 Unit(s) SubCutaneous every 8 hours  influenza   Vaccine 0.5 milliLiter(s) IntraMuscular once  levETIRAcetam 1500 milliGRAM(s) Oral every 12 hours  ofloxacin 0.3% Solution 1 Drop(s) Right EYE four times a day  pantoprazole   Suspension 40 milliGRAM(s) Oral daily  petrolatum Ophthalmic Ointment 1 Application(s) Both EYES two times a day  phenytoin   Suspension 150 milliGRAM(s) Oral <User Schedule>  phenytoin   Suspension 200 milliGRAM(s) Oral <User Schedule>  propranolol 5 milliGRAM(s) Oral every 12 hours  sodium chloride 0.65% Nasal 1 Spray(s) Both Nostrils two times a day    MEDICATIONS  (PRN):  acetaminophen   Oral Liquid .. 650 milliGRAM(s) Oral every 6 hours PRN Temp greater or equal to 38C (100.4F), Mild Pain (1 - 3)  LORazepam   Injectable 2 milliGRAM(s) IV Push once PRN Seizure Activity (NOTIFY PROVIDER PRIOR TO GIVING)    Dosing Anthropometrics:   Height for BMI (FEET)	5 Feet  Height for BMI (INCHES)	8 Inch(s)  Height for BMI (CM)	172.72 Centimeter(s)  Weight for BMI (lbs)	176.4 lb  Weight for BMI (kg)	80 kg  Body Mass Index	              26.8  ideal body weight:               154 pounds / 70 kg   % ideal body weight             114%     Weight Change: No new wt obtained since admit per nursing documentation, strongly recommend nursing to obtain new wt to track/trend changes.   RD obtained bedweight today (1/23/25) of ~79.5kg, consistent with admission weight. RD to follow up with nursing.   RD obtained bedweight today 2/4/25) of ~76.1kg, consistent with admission weight. RD to follow up with nursing.     Estimated energy needs:  Weight used for calculations: most recent actual body weight  weight: 76.1kg (~167%)  calories: 25-30 calories/kg = ~7326-5028 calories/day  protein: 1.1-1.4g protein/kg = ~84-107g protein/day  fluids: 1mL/kcal = ~1903-2283mL/day  Other Calculations	Pt is ~% ideal body weight (~109%), thus actual body weight used for all calculations. Needs adjusted for age, clinical course.     Subjective: This is a 44 year old male, unknown past medical history (reported stroke and MI by coworkers) presented to [facility] with AMS, Pt was working at [employer] when he was found down by coworkers. EMS called and pt brought to [facility] ED. Intubated, sedated, started on cardene for SBPs in 200s. CT head showed brain stem bleed. Transferred to NSICU for further management. Pt is status post tracheostomy 10/14. Pt is status post PEG on 10/21. Re-upgrade to ICU in the setting of desaturation event and suctioning requirements 11/15. Re-upgrade to NSICU 12/15 2/2 desaturation and tachycardia. Pt has since been stepped down to [facility].      Patient seen at bedside on [facility] for nutrition follow up. RD spoke to nursing on the unit, pt was resting in bed. Current diet order: NPO with tube feeds of i-marker Renal Support, at goal rate of 60 cc/hr x18 hrs (providing ~ 1944 kcal, 86g protein, 713 mL free water), meeting 100% estimated nutrient needs. No tube feeding tolerance issues noted. GI within normal limits at this time as per flowsheets, fecal incontinence noted, BM on 2/3/25. Labs: BUN elevated (45), electrolytes such as sodium, potassium, phosphorus, magnesium are within normal limits; eGFR is within normal ranges; medical team is aware, RD to monitor trends. Medications reviewed as above. RD to follow up and speak with medical team to see if there is still a concern for renal function; if not, alternative formula option can be explored for this pt during admission and for discharge if pt is discharged with tube feedings. RD will continue to follow, please see nutrition recommendations below.     Previous Nutrition Diagnosis: Inadequate Oral Intake related to inability to meet nutritional demands via PO as evidenced by NPO with tube feedings    Active [ x ]  Resolved [   ]    Goal: Pt will consume >/= 75% of estimated nutrient needs via tolerated route     Nutrition Recommendations:  1. Consider the following regimen if renal formula is no longer necessary or warranted: if team would like to keep the same brand, consider i-marker Standard 1.4 via gastrostomy: GOAL of 76mL/hour x 18 hours, this provides: 1368mL total volume, 1915 calories, 85g protein, 971mL free water; this meets ~25 calories/kg actual body weight, 1.12g protein/kg actual body weight; consider ~240mL free water flushes every 6 hours**  **if alternative formula requested in the instance of insurance-related concerns, please consult dietitian/nutrition team for updated formula recommendations**  **Provide Banatrol Tube Feeding prn**  **Maintain aspiration precautions at all times; monitor for signs/symptoms of intolerance**  2. Monitor weights (weekly), GI function, skin integrity; electrolytes, BMP, glucose, CBC per MD discretion *renal indices*  3. Pain and bowel regimen per medical team  4. Align nutrition with goals of care at all times  5. Recommend nursing to obtain new weights to track/trend changes    Education: deferred at this time related to pt resting in bed, and cognitive status.     Risk Level: High [   ] Moderate [ x ] Low [   ].    Jessica Zamarripa, NICHOLEN, CDN, ext [phone], also available via TEAMS

## 2025-02-04 NOTE — PROGRESS NOTE ADULT - SUBJECTIVE AND OBJECTIVE BOX
HPI:  Unknown age male, unknown past medical history (reported stroke and MI by coworkers) presented to [facility] with AMS, Pt was working at [employer] when he was found down by coworkers. EMS called and pt brought to [facility] ED. Intubated, sedated, started on cardene for SBPs in 200s. CT head showed brain stem bleed. Transferred to NSICU for further management.  (30 Sep 2024 12:55)      Subjective:  GEORGE overnight.     Hospital course:  9/30: transferred from [facility]. A line placed. Versed dc'd. Cy Rader at bedside, states pt has family in [place], cannot confirm medications or PMH other than stroke and MI. 250cc bolus 3% given. LR switched to NS. hydralazine 25q8 started, 3% started, switched propofol to precedex   10/1: stability CTH done. Added labetalol, started TF. Palliative consulted. ethics consulted to determine surrogate. febrile 103, pan cx sent  10/2: BD 2, GEORGE overnight. TF resumed. Desatt'd to 80s, FiO2 inc. to 50. Fentanyl given, ABG, CXR ordered. Maxxed on precedex, started on propofol for DARIEN -4 - -5. Precedex dc'd. Duonebs, mucomyst, hypertonic added. 3% dc'd. Cardene dc'd. Start vanc/CTX. Increased labetalol 200q8. MRSA negative, dc'd vanc. ETT pulled back 2cm x 2, good positioning after confirmatory chest xray. Ethics attempting to establish HCP with family. Na 159, starting FW 250q6 for range 150-155.   10/3: BD3, GEORGE o/n, neuro stable. Na elevating, FW increased to 300q6. Dc'd bowel reg for diarrhea. vEEG started. SQH 5000q8 tonight.   10/4: BD 4, albumn bolus, incr. LR to 80 2/2 incr. in Cr, LR to 10 0cc/hr for uptrending Cr. Started 7% hypersal for 48hrs and SL atropine for inline/oral thick secretions. Dc'd CTX and started ancef for MSSA in the sputum. Nephrology consulted for CKD, f/u recs. SBP 170s, given hydralazine 10mg IVP.   10/5: BD5, o/n 10mg IVP hydralazine given for SBP 170s and started on hydralazine 25q8 via OGT. 10mg IV push labetalol for SBP > 160s. RT placed for diarrhea.   10/6: BD6, o/n FW increased to 350q4 per nephrology recs. IV tylenol for temp 100.6, SBp 160s presumed uncomfortable.   10/7: BD7, overnight pancultured for temp 101.8F.   10/8: BD8. GEORGE. Cr bumped. decreased LR to 75cc/hr. Adding simethicone ATC. incr hydralazine 50mgTID. Incr labetalol 300mgTID. Na 145, decreased FWF to 250q6. Start precedex. FENa consistent with intrinsic kidney injury. Pend repeat renal US. Retaining up to 1.3L, bladder scans q6, straight cath PRN  10/9: BD 9. GEORGE overnight. Neuro stable. abd xray for distention w non-specific gas pattern, OGT to LIWS for morning. duonebs/mucomyst to q8 for improving secretions. Changed tube feeds to Jevity 1.5 20cc/hr, low rate due to abdominal distention, nepro dense and more difficult to digest. Tolerating CPAP, confirmed by ABG.   10/10: BD 10. GEORGE overnight. Neuro stable. (+) gabriel for urinary retention on bladder scan. inc TF to goal rate of 40cc/hr. family leaning toward pursuing trach/PEG. 1/2 amp for FS 81.   10/11: BD 11. GEORGE overnight. Neuro stable. Trach/PEG consults placed.   10/12: BD 12. GEORGE overnight. Neuro stable. MRI brain complete.   10/13: BD 13. Increase flomax. Hold SQH after PM dose for trach tm. IVL.   10/14: BD 14. GEORGE overnight, remains on AC/VC. Gabriel placed for urinary retention. Dc'd free water.  S/p trach with pulm. NGT placed and CXR confirmed in good position.   10/15: BD 15, GEORGE ovn. resumed feeds. spiked 101, pan cx sent.   10/16: BD 16. GEORGE ovn. Lokelma 5mg for K+ 5.4. Started vanc q 24/zosyn for empiric PNA coverage, IVF to 100/hr. PEG held for fever.   10/17: BD 17,  ordered serum osm and urine osm for am. Started sinemet for neurostimulation. Increased cardura to 0.8. Started FW 100q4, dc'd IVF. MRSA negative, dc'd vanc. NGT replaced d/t coiling.   10/18: BD 18, GEORGE overnight, neuro stable. Amantadine added for neurostim. zosyn changed to unasyn for acinetobacter baumannii, failed TOV and required SC  10/19: BD 19, GEORGE ovn. cardura 2mg added for retention. labetalol decreased 200q8, hydralazine decreased 25q8. Gabriel replaced.   10/20: BD20, GEORGE overnight. NGT dislodged, replaced. PEG tomorrow w/ gen surg, FW increased to 150q4 and labetalol decreased to 100q8, lokelma given for hyperkalemia.   10/21: BD 21. POD0 PEG placement with Gen surg. decr labetolol to 50q8, incr. cardura to 0.4, started lokelma and phoslo, dc gabriel POD0 PEG placement with Gen surg.  10/22: BD 22. Plan to start TF today via PEG. dc labetalol, Following ophtho recs. Increased apnea settings - found to be in cheyne-moe respiration. CPAP 5/5.  10/23: BD 23. hydralazine d/c'd, trach collar trial today. Rectal tube placed at 6am.  10/24: BD24, o/n lokelma held due to diarrhea. Free water 100q6 resumed. dc'd tamsulosin, amantadine. Incr'd cardura to 8mg qhs. Dc'd FW. Switched jevity to nepro. gabriel placed for high urine output. Started SL atropine for oral secretions. Dc'd free water.  10/25: BD25, o/n decreased suctioning requirements to > q4hrs, GEORGE. Cr improving, cont phoslo, lokelma held at this time. Gabriel placed yest, cont. Tolerating trach collar. Given 500cc plasmalyte bolus for ANIKA. Dc'd sinemet.   10/26: BD26, o/n resumed lokelma 5mg daily and resumed 100cc free water q6hrs. Change in neuro status with new right pupillary dilation with anisocoria (right pupil 6mm fixed and left pupil 3mm briskly reactive). Given 23.4% NaCl bullet, taken for emergent CTH showing mostly resolved pontine hemorrhage, continued brainstem hypodensity likely edema d/t hemorrhage, no new hemorrhage or infarct, no herniation, mild increase in size of left lateral ventricle. Vitals remaine stable. Na goal > 140.   10/27: BD27, o/n GEORGE.Neuro stable. Pend stepdown with airway bed.   10/28: BD 28. GEORGE overnight. Neuro stable. Miralax ordered. Gabriel removed, pending TOV.  10/29: BD 29. GEORGE o/n. Given 2L NS over 8 hrs for increased BUN/Cr ratio. Gabriel placed for frequent straight cath.   10/30: BD 30.   10/31: BD 31. GEORGE overnight. Na 149, increased free water to 200q6. 1L NS for uptrending BUN.   11/1: BD 32. GEORGE overnight. Given 1L NS for dehydration. Na 146, increased FW to 250q6.   11/2: BD 33 GEORGE overnight, neuro stable, given 500cc bolus for net negative status and tachycardia   11/3: BD 34, GEORGE overnight, neuro stable. Patient remains tachycardic, EKG showing sinus tachycardia, given additional 500cc NS bolus. Febrile to 101.9F, pan cultured (without UA), CXR WNL, given tylenol.   11/4: BD 35, GEORGE overnight, neuro stable. Given 1L NS for tachycardia. sputum (+) for stenotropohomas maltophilia.   11/5: BD 36 GEORGE overnight, neuro stable. Vancomycin dc'd. Chest PT BID. ID consulted, cont zosyn.  11/6: BD 37. blood cx + klebsiella dc zosyn changed to cefepime, CTAP ordered, rpt blood cx sent.    11/7: BD 38. Pending CT A/P, given 250cc bolus and starting maintenance fluids overnight. Pending CT A/P after bolus   11/8: BD 39. CT CAP negative for infection.   11/9: BD 40. GEORGE overnight.  11/10: BD 41. GEORGE overnight. desat to 85 on trach collar, O2 inc to 10L and 100%, O2 sat inc to 95. pt tachy to 110s, euvolemic. given tylenol. ABG and CXR ordered. spiked fever, pancultured, RVP negative. AM ABG w pO2 79, rpt w pO2 79. pt appears comfortable, satting 94%.   11/11: BD 42. GEORGE overnight. pt became tachy to 130s, desat to 90 on 100% FiO2 and 10L. suctioned, (+) productive cough. temp 101.4, given 1g IV tylenol and 500cc NS bolus for euvolemia. fever and HR downtrending. LE dopplers negative for dvt  11/12: BD 43, GEORGE ovn, fever and HR downtrending, satting 97% 70% FIO2  11/13: BD 44, GEORGE ovn. started standing tylenol x24 hours for tachycardia. desat to 80s, o2 increased. CXR stable, pending CTA PE protocol.   11/14: BD 45, GEORGE overnight, neuro stable. resp therapy dec FiO2 to 70%.   11/15: BD 46, GEORGE overnight, neuro stable.  Rapid called for desaturation 30s, tachycardic 140s. Patient bagged, 100% fio2, heavily suctioned. CXR/POCUS unremarkable. ABG c/w desaturation. WBC 14.71. Afebrile. O2 improved to 90s and patient upgraded to ICU. ABG paO2 30s improved to 89 on vent. IV Tylenol x 1, sputum sent. Start protonix while o-n vent.   11/16: BD 47. POCUS showed collapsable IVCF, given 1L bolus. Vanco/Cefpime added empriic for PNA, NGT feeds restarted. MRSA swab neg, Vanc DC'd.   11/17: BD 48. GEORGE overnight. 1l bolus for tachycardia. Spiked to 101, cultured. 500cc bolus for tachycardia, tachy to 148 given 25mcg fentanyl, 250cc albumin, 1.5L bolus. 5 IV lopressor with response HR to 100s. +Stenotrophomonas on sputum cx.   11/18: BD 49. GEORGE overnight. Consulted ID, cefepime switched to bactrim x7days. Started hydrochlorothiazine 12.5mg daily.  11/19: BD 50. Tachy 120s, given tylenol and 500cc NS. Tolerating 5/5, switched to TCx. Holding phos binder. D/c Bactrim. D/c gabriel, f/u TOV. Dc'd PPI.   11/20: BD 51. GEORGE ovn. 1600 satting low 90s, mildly tachy to 110s, afebrile, RR wnl. O2 improved to mid 90s while inc O2 to 100% on TCx. CPAP 5/5 placed back on.  11/21: BD 52, GEORGE ovn, tolerating CPAP 5/5. Switch to trach collar during the day if tolerating well. HCTZ held for Cr bump, straight cath frequence increased to q4  11/22: BD 53, GEORGE ovn. Resumed phoslo. Gabriel placed. Resumed HCTZ.   11/23: BD 54. Holding tylenol in setting of possible fever, will require pan cx if febrile. Cr improved today. Cont CPAP. Bowel regimen held i/s/o diarrhea. FOBT negative.  11/24: BD 55. GEORGE overnight. Neuro stable. HCTZ dc'ed, started lisinopril 5. Lokelma dc'ed for K 3.7.   11/25: BD 56, GEORGE overnight. Neuro stable. dc'd lisinopril 5mg. Gabriel dc'd. TOV. 1545 noted to be hypotensive, MAP 50, in supine position on chair, HR 60s, afebrile, O2 96%. Given 1L cc NS bolus, placed back on bed in reverse trendelenberg, improved to map of 66. Neostick at bedside. Vitals check q1h. Dc'ed amlodipine. Failed TOV, bladder scan q6, sc prn. Added back Senna.   11/26: BD 57, GEORGE overnight. Neuro stable. Dc'd phoslo.   11/27: BD 58, GEORGE overnight. Neuro stable.    11/28: BD 59. Gabriel replaced.   11/29: GEORGE.  11/30: GEORGE, neuro stable.   12/1: BD 62, GEORGE overnight  12/2: BD 63, GEORGE overnight.; Given 1L bolus of LR for uptrending BUN/Cr.  12/3: BD 64. Reinstated eye gtt/moisture chamber given increased Rt eye injection  12/4: BD 65. GEORGE overnight. Attempted to speak with ophtho regarding eyelid weight/closure but no answer, full mailbox.   12/5: BD 66, GEORGE overnight, bowel regimen increased and had BM.   12/6: BD 67, GEORGE overnight, neuro stable.  12/7: GEORGE overnight, neuro stable. /110s, given x1 hydralazine 10 mg IVP. Restarted home amlodipine 5mg.  12/8: GEORGE. OOB to chair.     12/11: GEORGE, mucomyst added for thick secretions, simethicone for abd distension, abd xray with stool burden, increased bowel regimen.   12/12: GEORGE overnight.   12/13: GEORGE overnight.   12/14: GEORGE overnight  12/15: o/n Patient became tachycardic HR 120s and 10 minutes later O2 sat dropped as low as 89%. Patient suctioned without improvement in O2 sat and tube feeds found in suction catheter. TFs held.  STAT CXR ordered. STAT labs sent. Respiratory therapist called to bedside and patient trach connected to ventilator. After connection to ventilator and further suctioning O2 sat improved to 97% but patient HR remains 120-130s. Upgraded to NSICU for further management. Vancomycin and zosyn started. CTA  chest PE protocol and CTH ordered. Blood cultures sent.given 500cc bolus, rpt ABG sent pO2 243, CTH and CTA chest done. FS while NPO, FiO2 dec 50 pending ABG. sputum cx positive for few GPC and GNR.   12/16: GEORGE overnight, restarted amlodipine, troponin 75   12/17: GEORGE overnight, neuro stable. Attempted CPAP this morning, did not tolerate and back on full vent support. Dc'd Vanc. Tachycardia to 140s, noted extremities to be twitching along with jaw twitching. Given 2g of Keppra, total 4mg ativan and placed on EEG, full set of labs and lactate negative. Resumed trickle feeds at 20cc/hr. Given 250cc albumin for tachycardia.   12/18: GEORGE overnight. Neuro stable. CTH stable. EEG negative and dc'd.   12/19: GEORGE overnight. neuro stable. SIMV most of day, AC/VC at night.   12/20: GEORGE overnight, neuro stable. remains on VC/AC  12/21: GEORGE ovn. 1L bolus for tachy to 120s-130s.   12/22: GEORGE ovn. Tachy to 120s, given tylenol and IVF. 2mg IV ativan given for L jaw twitching. Sx resolved for 3 minutes and started again. Epilepsy contacted. Given 2mg IV ativan. Continued twitching. Increased keppra to 1500mg BID, 2mg ativan given. Propranolol started for refractory tachycardia. vEEG ordered. albumin given. POCUS performed, no b lines. Started on fosphenytoin, loaded w 20mg/kg then 100mg TID. febrile, pancx, started cefepime 2g q8, vancomycin  12/23: GEORGE ovn. +focal motor seizures on eeg. ID consulted. Vancomycin dc'ed as per ID.  12/24: GEORGE ovn, neuro stable. Baclofen 5 mg q12 started for hiccups. Na 129 from 134. Urine lytes c/w SIADH. Given 250cc 3% bolus. CT chest for infection w/u - f/u read. Repeat Na 136. UA negative  12/25: GEORGE ovn.   12/26: GEORGE ovn  12/27: GEORGE overnight. Blood cx neg @ 4 days, DC cefepime per medicine (sputum colonized).   12/28: Desaturation o/n to 80's, CXR obtained, pulse ox changed and sats resolved. Na 133 from 138, 250cc 3% bolus given. Cefepime resumed until 12/30 per ID. Rpt Na 138.  12/29: GEORGE overnight. Na stable.   12/30: GEORGE overnight. Family meeting with son, pt now DNR.   12/31: GEORGE overnight.   1/1: GEORGE overnight, neuro stable.  1/2: GEORGE overnight, neuro stable. FW decreased to 100q6.   1/3: GEORGE overnight, neuro stable. fosphenytoin IV changed to pheytoin PO via PEG per epilepsy recommendations  1/4: GEORGE overnight, neuro stable. FW incr. to 100q4  1/5: GEORGE overnight, neuro stable.   1/6: GEORGE overnight, neuro stable   1/7: GEORGE overnight, neuro stable. BUN/Cr increased, increased FW to 200q6. Given 1L bolus of LR over 2 hours. Gabriel d/c'd, voiding, continue bladder scan q6.   1/8: GEORGE overnight, neuro stable. BUN/Cr improving.  1/9: GEORGE overnight, neuro stable.   1/10: GEORGE overnight, neuro stable.   1/11: GEORGE overnight, neuro stable, vent settings stable.   1/12: GEORGE overnight, neuro stable. Febrile to 102F, f/u pan cx, chest xray, given tylenol. Zosyn started.   1/13: GEORGE overnight, neuro stable, cont Zosyn for presumed PNA, prelim sputum cx growing; few GS neg diplococci, mod GS neg rods, rare GS + rods, fever trend improved. Free water increased to [id number].   1/14: GEORGE overnight. pending renal US for elevated Cr  1/15: GEORGE ovn. renal US complete.   1/16: GEORGE overnight, neuro stable   1/17: GEORGE overnight, neuro stable   1/18: GEORGE overnight, neuro stable.   1/19: GEORGE overnight, neuro stable. Propranolol dec to 5q8.   1/20: GEORGE overnight, neuro stable. Weaned propranolol to 5mg BID.   1/21: GEORGE ovn, in AM having rapid vertical eye movements with facial twitching, 2g total keppra given for AM dose and phenytoin level ordered, vital signs stable. CTH stable. Phenytoin increased to 100/100/150mg per epilepsy  1/22: GEORGE ovn. IV hydral x 1 for SBP 170s.   1/23: Cont to have focal motor seizures. Per epilepsy, changed phenytoin dose to 100/100/200.   1/24: Phenytoin lvl tmrw AM. Chest Xray completed due to temp 100.2F  1/25: GEORGE overnight. Incr dilantin morning and afternoon per epilepsy.   1/26: GEORGE overnight. Sustained focal twitching noticed by nursing. Ativan 8mg in total given. Fosphenytoin 1500mg IV given. Placed on EEG. Epilepsy following. TFs held. Phenytoin increased to 150/150/200.   1/27: o/n CTH completed due to continued lethargy after ativan given yesterday afternoon. TFs resumed. 500cc bolus NS given for SBP 90s. EEG dc'ed, discussed w/ Dr. Tomlin. Febrile 101F, pan cx sent. Likely left sided infiltrate on CXR, c/f aspiration PNA. Started on vanc/zosyn. MRSA swab pending.   1/28: Neuro stable, on vanc/zosyn. +UTI on UA, MRSA negative. Vanc dc'd. RVP negative. Zosyn increased to pseudomonal dosing.   1/29: GEORGE overnight, neuro stable.  1/30: GEORGE overnight, neuro stable. Dc'd zosyn due to resistance, switched to Levaquin.  1/31: GEORGE ovenight, neuro stable.   2/1: Brief desat. Improved with neb and reposititioning. ABG and POCUS wnl.   2/4: GEORGE overnight    Vital Signs Last 24 Hrs  T(C): 36.3 (03 Feb 2025 21:35), Max: 36.7 (03 Feb 2025 09:02)  T(F): 97.3 (03 Feb 2025 21:35), Max: 98.1 (03 Feb 2025 09:02)  HR: 78 (04 Feb 2025 01:06) (74 - 82)  BP: 118/71 (03 Feb 2025 23:35) (101/67 - 125/82)  BP(mean): 90 (03 Feb 2025 23:35) (80 - 100)  RR: 15 (04 Feb 2025 01:06) (14 - 17)  SpO2: 97% (04 Feb 2025 01:06) (96% - 99%)    Parameters below as of 04 Feb 2025 01:06  Patient On (Oxygen Delivery Method): ventilator    O2 Concentration (%): 40    I&O's Summary    02 Feb 2025 07:01  -  03 Feb 2025 07:00  --------------------------------------------------------  IN: 2140 mL / OUT: 1405 mL / NET: 735 mL    03 Feb 2025 07:01  -  04 Feb 2025 01:45  --------------------------------------------------------  IN: 1380 mL / OUT: 985 mL / NET: 395 mL        PHYSICAL EXAM:  General: patient seen laying supine in bed in NAD  Neuro: opens eyes, tracks vertically, RUE squeezes hand to command, b/l lower extremities withdraw to noxious,  LUE 0/5, mild facial twitching  HEENT: PERRL, +trach to vent  Neck: supple  Cardiac: RRR, S1S2  Pulmonary: chest rise symmetric  Abdomen: soft, nontender, nondistended, +PEG  Ext: perfusing well  Skin: warm, dry        LABS:                        11.1   8.55  )-----------( 244      ( 02 Feb 2025 05:30 )             35.3     02-02    138  |  100  |  36[H]  ----------------------------<  149[H]  3.5   |  24  |  1.18    Ca    9.4      02 Feb 2025 05:30  Phos  4.1     02-02  Mg     2.1     02-02        Urinalysis Basic - ( 02 Feb 2025 05:30 )    Color: x / Appearance: x / SG: x / pH: x  Gluc: 149 mg/dL / Ketone: x  / Bili: x / Urobili: x   Blood: x / Protein: x / Nitrite: x   Leuk Esterase: x / RBC: x / WBC x   Sq Epi: x / Non Sq Epi: x / Bacteria: x          CAPILLARY BLOOD GLUCOSE          Drug Levels: [] N/A  Phenytoin Level, Serum: 16.9 ug/mL (01-28 @ 10:41)    CSF Analysis: [] N/A      Allergies    Allergy Status Unknown    Intolerances      MEDICATIONS:  Antibiotics:    Neuro:  acetaminophen   Oral Liquid .. 650 milliGRAM(s) Oral every 6 hours PRN  baclofen 5 milliGRAM(s) Oral every 12 hours  levETIRAcetam 1500 milliGRAM(s) Oral every 12 hours  LORazepam   Injectable 2 milliGRAM(s) IV Push once PRN  phenytoin   Suspension 150 milliGRAM(s) Oral <User Schedule>  phenytoin   Suspension 200 milliGRAM(s) Oral <User Schedule>    Anticoagulation:  heparin   Injectable 5000 Unit(s) SubCutaneous every 8 hours    OTHER:  acetylcysteine 10%  Inhalation 4 milliLiter(s) Inhalation every 6 hours  albuterol/ipratropium for Nebulization 3 milliLiter(s) Nebulizer every 6 hours  amLODIPine   Tablet 5 milliGRAM(s) Oral daily  artificial tears (preservative free) Ophthalmic Solution 1 Drop(s) Right EYE every 4 hours  chlorhexidine 0.12% Liquid 15 milliLiter(s) Oral Mucosa every 12 hours  chlorhexidine 2% Cloths 1 Application(s) Topical <User Schedule>  doxazosin 8 milliGRAM(s) Oral at bedtime  erythromycin   Ointment 1 Application(s) Right EYE at bedtime  fluticasone propionate 50 MICROgram(s)/spray Nasal Spray 1 Spray(s) Both Nostrils two times a day  influenza   Vaccine 0.5 milliLiter(s) IntraMuscular once  ofloxacin 0.3% Solution 1 Drop(s) Right EYE four times a day  pantoprazole   Suspension 40 milliGRAM(s) Oral daily  petrolatum Ophthalmic Ointment 1 Application(s) Both EYES two times a day  propranolol 5 milliGRAM(s) Oral every 12 hours  sodium chloride 0.65% Nasal 1 Spray(s) Both Nostrils two times a day    IVF:    CULTURES:  Culture Results:   No growth at 48 Hours (02-01 @ 19:45)  Culture Results:   >100,000 CFU/ml Klebsiella aerogenes (Previously Enterobacter) (01-27 @ 23:20)        ASSESSMENT:  44M PMH ?stroke/MI present to [facility] after collapsing at work. Decorticate posturing, vomiting, intubated for airway protection. 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 Transferred to [facility] for further management. s/p trach 10/14. s/p peg 10/21. Re-upgrade to ICU 2/2 desaturation event and suctioning requirements 11/15. Re-upgrade to NSICU 12/15 2/2 desaturation and tachycardia.    Neuro:  - neuro/vitals q4h  - pain control: tylenol prn  - seizure tx: keppra 1500mg BID, phenytoin 150/150/200  - s/p vEEG (1/26-1/27)vEEG (10/3-4) negative, (10/17-10/19) negative, (12/17-12/18) negative, (12/22-12/25), (1/26-1/28),  +focal motor seizures not correlating w/ EEG,  - CTH 9/30: enlarged pontine hemorrhage, CTH 10/3: stable, CTH 10/25: mostly resolved pontine hemorrhage, CTH 12/15: R mastoid air cell opacification; acute otitis media vs sterile effusion, CTH 12/18: stable. CTH 1/21 stable, CTH 1/27 stable  - MRI brain 10/12: parenchymal hemorrhage, acute/subacute R cerebellar stroke      CV:  - -160  - tachycardia: propranolol 5mg BID   - HTN: amlodipine 5mg  - echo (9/30) EF 75%, repeat 12/16: 57%    Resp:  - trach to vent, AC/VC 40/400/14/6  - Secretions: duonebs/mucomyst/chest PT q6h     GI:  - TFs via PEG  - bowel regimen held for loose stool, last BM 2/2  - baclofen 5q12 for hiccups    Renal:  - FW 200cc q4h for hydration  - Urinary retenion: Gabriel removed 1/7, SC prn  - CKD: trend BUN/Cr  - renal US 10/1, 10/8, 1/15: Increased bilateral renal echogenicity consistent with medical renal disease    Endo:  - A1c 5.4    Heme:  - DVT ppx: SCDs, SQH 5000u q8h   - LE dopplers negative 12/16    ID:  - last pan cx 1/27, +klebsiella UTI s/p  levaquin (1/30-2/3  - empiric PNA: cefepime (12/22-12/30), s/p vanc (12/22-12/23), s/p zosyn (12/15-12/20), s/p vanc (12/15-12/16), s/p zosyn (1/12 -1/18)  - s/p Stenotrophomonas maltophilia PNA: s/p Bactrim (11/18-11/19) s/p Cefepime (11/16-11/18)  - 11/3, (+) sputum for stenotrophomas maltophlia, blood cx (+) klebsiella, cefepime 2gq12 (11/6 - 11/12)   - S/p Ancef (10/4-10/14) for PNA, and s/p Unasyn (10/18-10/23) +actinobacter baumanii     MISC:  - ophtho consult for keratitis  - erythromycin ointment R eye q4hrs, ofloxacin ointment R eye QID, artificial tears R eye q2hrs, moisture chamber at bedtime    Dispo: SDU status, DNR, pending PRUCOL for benefits     D/w Dr. D'Amico

## 2025-02-04 NOTE — PROGRESS NOTE ADULT - SUBJECTIVE AND OBJECTIVE BOX
OVERNIGHT EVENTS:  NAEO per nursing staff , no events of Telemetry     SUBJECTIVE : patient does not talk , he was awake and resting in bed     PHYSICAL EXAM:  General: NAD, on vent through trach, breathing is nonlabored  HEENT: trach collar in place  Lungs: clear to auscultation anteriorly  Heart: RRR, normal s1s2  Abdomen: soft, no distension, + BS, PEG tube in place, site is clean and dry   Extremities:  warm, wwp     VITAL SIGNS:  Vital Signs Last 24 Hrs  T(C): 36.7 (04 Feb 2025 08:53), Max: 36.9 (04 Feb 2025 04:43)  T(F): 98 (04 Feb 2025 08:53), Max: 98.5 (04 Feb 2025 04:43)  HR: 84 (04 Feb 2025 11:48) (74 - 84)  BP: 118/78 (04 Feb 2025 11:48) (114/80 - 123/81)  BP(mean): 93 (04 Feb 2025 11:48) (90 - 98)  RR: 14 (04 Feb 2025 11:48) (14 - 17)  SpO2: 100% (04 Feb 2025 11:48) (93% - 100%)    Parameters below as of 04 Feb 2025 11:48  Patient On (Oxygen Delivery Method): ventilator    O2 Concentration (%): 40      MEDICATIONS:  MEDICATIONS  (STANDING):  acetylcysteine 10%  Inhalation 4 milliLiter(s) Inhalation every 6 hours  albuterol/ipratropium for Nebulization 3 milliLiter(s) Nebulizer every 6 hours  amLODIPine   Tablet 5 milliGRAM(s) Oral daily  artificial tears (preservative free) Ophthalmic Solution 1 Drop(s) Right EYE every 4 hours  baclofen 5 milliGRAM(s) Oral every 12 hours  chlorhexidine 0.12% Liquid 15 milliLiter(s) Oral Mucosa every 12 hours  chlorhexidine 2% Cloths 1 Application(s) Topical <User Schedule>  doxazosin 8 milliGRAM(s) Oral at bedtime  erythromycin   Ointment 1 Application(s) Right EYE at bedtime  fluticasone propionate 50 MICROgram(s)/spray Nasal Spray 1 Spray(s) Both Nostrils two times a day  heparin   Injectable 5000 Unit(s) SubCutaneous every 8 hours  influenza   Vaccine 0.5 milliLiter(s) IntraMuscular once  levETIRAcetam 1500 milliGRAM(s) Oral every 12 hours  ofloxacin 0.3% Solution 1 Drop(s) Right EYE four times a day  pantoprazole   Suspension 40 milliGRAM(s) Oral daily  petrolatum Ophthalmic Ointment 1 Application(s) Both EYES two times a day  phenytoin   Suspension 150 milliGRAM(s) Oral <User Schedule>  phenytoin   Suspension 200 milliGRAM(s) Oral <User Schedule>  propranolol 5 milliGRAM(s) Oral every 12 hours  sodium chloride 0.65% Nasal 1 Spray(s) Both Nostrils two times a day        LABS:                                   10.9   6.82  )-----------( 241      ( 04 Feb 2025 06:30 )             34.6     02-04    139  |  102  |  45[H]  ----------------------------<  125[H]  3.9   |  23  |  1.23    Ca    9.1      04 Feb 2025 06:30  Phos  3.9     02-04  Mg     2.2     02-04        CAPILLARY BLOOD GLUCOSE          RADIOLOGY & ADDITIONAL TESTS: Reviewed.

## 2025-02-04 NOTE — PROGRESS NOTE ADULT - ASSESSMENT
44M with unclear PMH (?stroke, MI) who was found down at work, intubated for airway protection and found to have acute parenchymal hemorrhage within nabil with mass effect (+ acute/subacute right cerebellar infarct) in setting of hypertensive emergency, transferred to [facility] for further neurosurgical care. Hospital course c/b poor neurologic recovery s/p trach-PEG, AUR s/p gabriel, ANIKA on CKD c/b hyperkalemia, HAP s/p amp-sulbactam (EOT 10/23), K aerogenes bacteremia  treated with 2 weeks of Cefepime per ID , On 11/15 he became septic and was transferred to NSICU due to increased o2 requirements and needed Vent support , Trach Cultures + for Stenotrophomonas which was treated with 7 days of Bactrim per ID , has been weaned of Vent since 11/23 and is now on 10lts at 40% o2 through Trach Collar. Stepped up to the NSICU on 12/15 for hypoxia and tachycardia. PE ruled out. On abx for 5 days. Unclear etiology. Now stepped down to 8Lach.    # Pontine hemorrhage  # Encephalopathy due to Intracranial Bleed   - Acute parenchymal hemorrhage within nabil with mass effect (+ acute/subacute right cerebellar infarct) in setting of hypertensive emergency.   - MRI brain also demonstrated acute/subacute R cerebellar stroke.   - No Neurosurgical Interventions were performed   - Secondary to IPH and cerebellar stroke - Trach and PEG Dependent    # Klebsiella UTI  - remains afebrile  - Urine cx 1/27 growing Klebsiella   - MRSA nares negative  - Completed 5 day course of Levaquin    # Seizures  - Continue Seizure precautions   - Continue Keppra and phenytoin - appreciate further recs from epilepsy    # Hypertension  - amlodipine 5mg daily with holding parameters     # Chronic respiratory failure.   - s/p percutaneous trach by pulm on 10/14/24  - Currently on Vent Support   - Continue Chest PT    # Neurogenic dysphagia.   - Pt s/p PEG with surgery 10/21/24  - TF per nutrition  - aspiration precautions and elevation of HOB.    # Acute urinary retention.   - doxazosin 8mg  - monitor urine output    # Functional quadriplegia in setting of brainstem hemorrhage  - decub precautions  - care per nursing protocol     # Elevated Creatinine and BUN , not meeting Criteria for ANIKA , Suspect Pre Renal , Ensure patient receives all his Free water Flushes and Continues to Receive Tube Feeds ( Total would be 2280ml/day - Tube Feeds 1080ml , Free Water 1200 ml)     51 minutes spent on this encounter  including face to face with patient, review of chart, care coordination and documentation.  plan discussed with neurosurgery team.

## 2025-02-05 LAB
ANION GAP SERPL CALC-SCNC: 12 MMOL/L — SIGNIFICANT CHANGE UP (ref 5–17)
BUN SERPL-MCNC: 46 MG/DL — HIGH (ref 7–23)
CALCIUM SERPL-MCNC: 9.1 MG/DL — SIGNIFICANT CHANGE UP (ref 8.4–10.5)
CHLORIDE SERPL-SCNC: 101 MMOL/L — SIGNIFICANT CHANGE UP (ref 96–108)
CO2 SERPL-SCNC: 24 MMOL/L — SIGNIFICANT CHANGE UP (ref 22–31)
CREAT SERPL-MCNC: 1.21 MG/DL — SIGNIFICANT CHANGE UP (ref 0.5–1.3)
EGFR: 75 ML/MIN/1.73M2 — SIGNIFICANT CHANGE UP
EGFR: 75 ML/MIN/1.73M2 — SIGNIFICANT CHANGE UP
GLUCOSE SERPL-MCNC: 107 MG/DL — HIGH (ref 70–99)
HCT VFR BLD CALC: 33.2 % — LOW (ref 39–50)
HGB BLD-MCNC: 10.7 G/DL — LOW (ref 13–17)
MAGNESIUM SERPL-MCNC: 2.1 MG/DL — SIGNIFICANT CHANGE UP (ref 1.6–2.6)
MCHC RBC-ENTMCNC: 31.3 PG — SIGNIFICANT CHANGE UP (ref 27–34)
MCHC RBC-ENTMCNC: 32.2 G/DL — SIGNIFICANT CHANGE UP (ref 32–36)
MCV RBC AUTO: 97.1 FL — SIGNIFICANT CHANGE UP (ref 80–100)
NRBC # BLD: 0 /100 WBCS — SIGNIFICANT CHANGE UP (ref 0–0)
NRBC BLD-RTO: 0 /100 WBCS — SIGNIFICANT CHANGE UP (ref 0–0)
PHOSPHATE SERPL-MCNC: 4.1 MG/DL — SIGNIFICANT CHANGE UP (ref 2.5–4.5)
PLATELET # BLD AUTO: 237 K/UL — SIGNIFICANT CHANGE UP (ref 150–400)
POTASSIUM SERPL-MCNC: 4 MMOL/L — SIGNIFICANT CHANGE UP (ref 3.5–5.3)
POTASSIUM SERPL-SCNC: 4 MMOL/L — SIGNIFICANT CHANGE UP (ref 3.5–5.3)
RBC # BLD: 3.42 M/UL — LOW (ref 4.2–5.8)
RBC # FLD: 13.2 % — SIGNIFICANT CHANGE UP (ref 10.3–14.5)
SODIUM SERPL-SCNC: 137 MMOL/L — SIGNIFICANT CHANGE UP (ref 135–145)
WBC # BLD: 9.03 K/UL — SIGNIFICANT CHANGE UP (ref 3.8–10.5)
WBC # FLD AUTO: 9.03 K/UL — SIGNIFICANT CHANGE UP (ref 3.8–10.5)

## 2025-02-05 PROCEDURE — 99231 SBSQ HOSP IP/OBS SF/LOW 25: CPT

## 2025-02-05 PROCEDURE — 99232 SBSQ HOSP IP/OBS MODERATE 35: CPT

## 2025-02-05 RX ADMIN — Medication 1 SPRAY(S): at 18:04

## 2025-02-05 RX ADMIN — Medication 1 DROP(S): at 10:06

## 2025-02-05 RX ADMIN — OFLOXACIN 1 DROP(S): 3 SOLUTION OPHTHALMIC at 05:08

## 2025-02-05 RX ADMIN — ERYTHROMYCIN 1 APPLICATION(S): 5 OINTMENT OPHTHALMIC at 22:37

## 2025-02-05 RX ADMIN — Medication 150 MILLIGRAM(S): at 11:26

## 2025-02-05 RX ADMIN — FLUTICASONE PROPIONATE 1 SPRAY(S): 50 SPRAY, METERED NASAL at 05:04

## 2025-02-05 RX ADMIN — HEPARIN SODIUM 5000 UNIT(S): 1000 INJECTION INTRAVENOUS; SUBCUTANEOUS at 05:05

## 2025-02-05 RX ADMIN — BACLOFEN 5 MILLIGRAM(S): 10 INJECTION INTRATHECAL at 05:05

## 2025-02-05 RX ADMIN — AMLODIPINE BESYLATE 5 MILLIGRAM(S): 10 TABLET ORAL at 05:05

## 2025-02-05 RX ADMIN — Medication 1 APPLICATION(S): at 05:04

## 2025-02-05 RX ADMIN — HEPARIN SODIUM 5000 UNIT(S): 1000 INJECTION INTRAVENOUS; SUBCUTANEOUS at 14:21

## 2025-02-05 RX ADMIN — Medication 1 APPLICATION(S): at 05:08

## 2025-02-05 RX ADMIN — ACETYLCYSTEINE 4 MILLILITER(S): 200 INHALANT RESPIRATORY (INHALATION) at 18:03

## 2025-02-05 RX ADMIN — Medication 15 MILLILITER(S): at 18:04

## 2025-02-05 RX ADMIN — ACETYLCYSTEINE 4 MILLILITER(S): 200 INHALANT RESPIRATORY (INHALATION) at 05:05

## 2025-02-05 RX ADMIN — DOXAZOSIN MESYLATE 8 MILLIGRAM(S): 8 TABLET ORAL at 22:38

## 2025-02-05 RX ADMIN — ACETYLCYSTEINE 4 MILLILITER(S): 200 INHALANT RESPIRATORY (INHALATION) at 11:00

## 2025-02-05 RX ADMIN — HEPARIN SODIUM 5000 UNIT(S): 1000 INJECTION INTRAVENOUS; SUBCUTANEOUS at 22:37

## 2025-02-05 RX ADMIN — IPRATROPIUM BROMIDE AND ALBUTEROL SULFATE 3 MILLILITER(S): .5; 2.5 SOLUTION RESPIRATORY (INHALATION) at 11:00

## 2025-02-05 RX ADMIN — Medication 1 DROP(S): at 18:02

## 2025-02-05 RX ADMIN — Medication 1 DROP(S): at 22:37

## 2025-02-05 RX ADMIN — FLUTICASONE PROPIONATE 1 SPRAY(S): 50 SPRAY, METERED NASAL at 18:04

## 2025-02-05 RX ADMIN — BACLOFEN 5 MILLIGRAM(S): 10 INJECTION INTRATHECAL at 18:05

## 2025-02-05 RX ADMIN — Medication 150 MILLIGRAM(S): at 05:08

## 2025-02-05 RX ADMIN — Medication 200 MILLIGRAM(S): at 19:09

## 2025-02-05 RX ADMIN — Medication 1 DROP(S): at 05:03

## 2025-02-05 RX ADMIN — LEVETIRACETAM 1500 MILLIGRAM(S): 10 INJECTION, SOLUTION INTRAVENOUS at 05:06

## 2025-02-05 RX ADMIN — IPRATROPIUM BROMIDE AND ALBUTEROL SULFATE 3 MILLILITER(S): .5; 2.5 SOLUTION RESPIRATORY (INHALATION) at 05:05

## 2025-02-05 RX ADMIN — Medication 1 DROP(S): at 14:21

## 2025-02-05 RX ADMIN — Medication 1 SPRAY(S): at 05:04

## 2025-02-05 RX ADMIN — OFLOXACIN 1 DROP(S): 3 SOLUTION OPHTHALMIC at 18:02

## 2025-02-05 RX ADMIN — Medication 1 DROP(S): at 02:00

## 2025-02-05 RX ADMIN — IPRATROPIUM BROMIDE AND ALBUTEROL SULFATE 3 MILLILITER(S): .5; 2.5 SOLUTION RESPIRATORY (INHALATION) at 18:02

## 2025-02-05 RX ADMIN — OFLOXACIN 1 DROP(S): 3 SOLUTION OPHTHALMIC at 11:00

## 2025-02-05 RX ADMIN — OFLOXACIN 1 DROP(S): 3 SOLUTION OPHTHALMIC at 23:59

## 2025-02-05 RX ADMIN — LEVETIRACETAM 1500 MILLIGRAM(S): 10 INJECTION, SOLUTION INTRAVENOUS at 18:05

## 2025-02-05 RX ADMIN — Medication 40 MILLIGRAM(S): at 11:01

## 2025-02-05 RX ADMIN — Medication 1 APPLICATION(S): at 18:28

## 2025-02-05 RX ADMIN — Medication 15 MILLILITER(S): at 05:05

## 2025-02-05 NOTE — PROGRESS NOTE ADULT - ASSESSMENT
44M with unclear PMH (?stroke, MI) who was found down at work, intubated for airway protection and found to have acute parenchymal hemorrhage within nabil with mass effect (+ acute/subacute right cerebellar infarct) in setting of hypertensive emergency, transferred to [facility] for further neurosurgical care. Hospital course c/b poor neurologic recovery s/p trach-PEG, AUR s/p gabriel, ANIKA on CKD c/b hyperkalemia, HAP s/p amp-sulbactam (EOT 10/23), K aerogenes bacteremia  treated with 2 weeks of Cefepime per ID , On 11/15 he became septic and was transferred to NSICU due to increased o2 requirements and needed Vent support , Trach Cultures + for Stenotrophomonas which was treated with 7 days of Bactrim per ID , has been weaned of Vent since 11/23 and is now on 10lts at 40% o2 through Trach Collar. Stepped up to the NSICU on 12/15 for hypoxia and tachycardia. PE ruled out. On abx for 5 days. Unclear etiology. Now stepped down to 8Lach.    # Pontine hemorrhage  # Encephalopathy due to Intracranial Bleed   - Acute parenchymal hemorrhage within nabil with mass effect (+ acute/subacute right cerebellar infarct) in setting of hypertensive emergency.   - MRI brain also demonstrated acute/subacute R cerebellar stroke.   - No Neurosurgical Interventions were performed   - Secondary to IPH and cerebellar stroke - Trach and PEG Dependent    # Klebsiella UTI  - remains afebrile  - Urine cx 1/27 growing Klebsiella   - MRSA nares negative  - Completed 5 day course of Levaquin    # Seizures  - Continue Seizure precautions   - Continue Keppra and phenytoin - appreciate further recs from epilepsy    # Hypertension  - amlodipine 5mg daily with holding parameters     # Chronic respiratory failure.   - s/p percutaneous trach by pulm on 10/14/24  - Currently on Vent Support   - Continue Chest PT    # Neurogenic dysphagia.   - Pt s/p PEG with surgery 10/21/24  - TF per nutrition  - aspiration precautions and elevation of HOB.    # Acute urinary retention.   - doxazosin 8mg  - monitor urine output    # Functional quadriplegia in setting of brainstem hemorrhage  - decub precautions  - care per nursing protocol     # Elevated Creatinine and BUN , not meeting Criteria for ANIKA , Suspect Pre Renal , Ensure patient receives all his Free water Flushes and Continues to Receive Tube Feeds ( Total would be 2280ml/day - Tube Feeds 1080ml , Free Water 1200 ml)     51 minutes spent on this encounter  including face to face with patient, review of chart, care coordination and documentation.  plan discussed with neurosurgery team.

## 2025-02-05 NOTE — PROGRESS NOTE ADULT - SUBJECTIVE AND OBJECTIVE BOX
OVERNIGHT EVENTS:  NAEO per nursing staff , no events of Telemetry     SUBJECTIVE : patient does not talk , he was awake and resting in bed     PHYSICAL EXAM:  General: NAD, on vent through trach, breathing is nonlabored  HEENT: trach collar in place  Lungs: clear to auscultation anteriorly  Heart: RRR, normal s1s2  Abdomen: soft, no distension, + BS, PEG tube in place, site is clean and dry   Extremities:  warm, wwp     VITAL SIGNS:  Vital Signs Last 24 Hrs  T(C): 36.4 (05 Feb 2025 08:53), Max: 37.6 (04 Feb 2025 22:05)  T(F): 97.5 (05 Feb 2025 08:53), Max: 99.7 (04 Feb 2025 22:05)  HR: 72 (05 Feb 2025 11:10) (70 - 112)  BP: 98/61 (05 Feb 2025 11:10) (98/61 - 131/86)  BP(mean): 74 (05 Feb 2025 11:10) (74 - 104)  RR: 18 (05 Feb 2025 11:10) (14 - 18)  SpO2: 98% (05 Feb 2025 11:10) (94% - 98%)    Parameters below as of 05 Feb 2025 11:10  Patient On (Oxygen Delivery Method): ventilator    O2 Concentration (%): 40      MEDICATIONS:  MEDICATIONS  (STANDING):  acetylcysteine 10%  Inhalation 4 milliLiter(s) Inhalation every 6 hours  albuterol/ipratropium for Nebulization 3 milliLiter(s) Nebulizer every 6 hours  amLODIPine   Tablet 5 milliGRAM(s) Oral daily  artificial tears (preservative free) Ophthalmic Solution 1 Drop(s) Right EYE every 4 hours  baclofen 5 milliGRAM(s) Oral every 12 hours  chlorhexidine 0.12% Liquid 15 milliLiter(s) Oral Mucosa every 12 hours  chlorhexidine 2% Cloths 1 Application(s) Topical <User Schedule>  doxazosin 8 milliGRAM(s) Oral at bedtime  erythromycin   Ointment 1 Application(s) Right EYE at bedtime  fluticasone propionate 50 MICROgram(s)/spray Nasal Spray 1 Spray(s) Both Nostrils two times a day  heparin   Injectable 5000 Unit(s) SubCutaneous every 8 hours  influenza   Vaccine 0.5 milliLiter(s) IntraMuscular once  levETIRAcetam 1500 milliGRAM(s) Oral every 12 hours  ofloxacin 0.3% Solution 1 Drop(s) Right EYE four times a day  pantoprazole   Suspension 40 milliGRAM(s) Oral daily  petrolatum Ophthalmic Ointment 1 Application(s) Both EYES two times a day  phenytoin   Suspension 150 milliGRAM(s) Oral <User Schedule>  phenytoin   Suspension 200 milliGRAM(s) Oral <User Schedule>  propranolol 5 milliGRAM(s) Oral every 12 hours  sodium chloride 0.65% Nasal 1 Spray(s) Both Nostrils two times a day        LABS:                                              10.7   9.03  )-----------( 237      ( 05 Feb 2025 06:28 )             33.2     02-05    137  |  101  |  46[H]  ----------------------------<  107[H]  4.0   |  24  |  1.21    Ca    9.1      05 Feb 2025 06:28  Phos  4.1     02-05  Mg     2.1     02-05          CAPILLARY BLOOD GLUCOSE          RADIOLOGY & ADDITIONAL TESTS: Reviewed.

## 2025-02-05 NOTE — PROGRESS NOTE ADULT - SUBJECTIVE AND OBJECTIVE BOX
HPI:  Unknown age male, unknown past medical history (reported stroke and MI by coworkers) presented to [facility] with AMS, Pt was working at [employer] when he was found down by coworkers. EMS called and pt brought to [facility] ED. Intubated, sedated, started on cardene for SBPs in 200s. CT head showed brain stem bleed. Transferred to NSICU for further management.  (30 Sep 2024 12:55)      Subjective:  GEORGE overnight    Hospital course:  9/30: transferred from [facility]. A line placed. Versed dc'd. Cy Rader at bedside, states pt has family in [place], cannot confirm medications or PMH other than stroke and MI. 250cc bolus 3% given. LR switched to NS. hydralazine 25q8 started, 3% started, switched propofol to precedex   10/1: stability CTH done. Added labetalol, started TF. Palliative consulted. ethics consulted to determine surrogate. febrile 103, pan cx sent  10/2: BD 2, GEORGE overnight. TF resumed. Desatt'd to 80s, FiO2 inc. to 50. Fentanyl given, ABG, CXR ordered. Maxxed on precedex, started on propofol for DARIEN -4 - -5. Precedex dc'd. Duonebs, mucomyst, hypertonic added. 3% dc'd. Cardene dc'd. Start vanc/CTX. Increased labetalol 200q8. MRSA negative, dc'd vanc. ETT pulled back 2cm x 2, good positioning after confirmatory chest xray. Ethics attempting to establish HCP with family. Na 159, starting FW 250q6 for range 150-155.   10/3: BD3, GEORGE o/n, neuro stable. Na elevating, FW increased to 300q6. Dc'd bowel reg for diarrhea. vEEG started. SQH 5000q8 tonight.   10/4: BD 4, albumn bolus, incr. LR to 80 2/2 incr. in Cr, LR to 10 0cc/hr for uptrending Cr. Started 7% hypersal for 48hrs and SL atropine for inline/oral thick secretions. Dc'd CTX and started ancef for MSSA in the sputum. Nephrology consulted for CKD, f/u recs. SBP 170s, given hydralazine 10mg IVP.   10/5: BD5, o/n 10mg IVP hydralazine given for SBP 170s and started on hydralazine 25q8 via OGT. 10mg IV push labetalol for SBP > 160s. RT placed for diarrhea.   10/6: BD6, o/n FW increased to 350q4 per nephrology recs. IV tylenol for temp 100.6, SBp 160s presumed uncomfortable.   10/7: BD7, overnight pancultured for temp 101.8F.   10/8: BD8. GEORGE. Cr bumped. decreased LR to 75cc/hr. Adding simethicone ATC. incr hydralazine 50mgTID. Incr labetalol 300mgTID. Na 145, decreased FWF to 250q6. Start precedex. FENa consistent with intrinsic kidney injury. Pend repeat renal US. Retaining up to 1.3L, bladder scans q6, straight cath PRN  10/9: BD 9. GEORGE overnight. Neuro stable. abd xray for distention w non-specific gas pattern, OGT to LIWS for morning. duonebs/mucomyst to q8 for improving secretions. Changed tube feeds to Jevity 1.5 20cc/hr, low rate due to abdominal distention, nepro dense and more difficult to digest. Tolerating CPAP, confirmed by ABG.   10/10: BD 10. GEORGE overnight. Neuro stable. (+) gabriel for urinary retention on bladder scan. inc TF to goal rate of 40cc/hr. family leaning toward pursuing trach/PEG. 1/2 amp for FS 81.   10/11: BD 11. GEORGE overnight. Neuro stable. Trach/PEG consults placed.   10/12: BD 12. GEORGE overnight. Neuro stable. MRI brain complete.   10/13: BD 13. Increase flomax. Hold SQH after PM dose for trach tm. IVL.   10/14: BD 14. GEORGE overnight, remains on AC/VC. Gabriel placed for urinary retention. Dc'd free water.  S/p trach with pulm. NGT placed and CXR confirmed in good position.   10/15: BD 15, GEORGE ovn. resumed feeds. spiked 101, pan cx sent.   10/16: BD 16. GEORGE ovn. Lokelma 5mg for K+ 5.4. Started vanc q 24/zosyn for empiric PNA coverage, IVF to 100/hr. PEG held for fever.   10/17: BD 17,  ordered serum osm and urine osm for am. Started sinemet for neurostimulation. Increased cardura to 0.8. Started FW 100q4, dc'd IVF. MRSA negative, dc'd vanc. NGT replaced d/t coiling.   10/18: BD 18, GEORGE overnight, neuro stable. Amantadine added for neurostim. zosyn changed to unasyn for acinetobacter baumannii, failed TOV and required SC  10/19: BD 19, GEORGE ovn. cardura 2mg added for retention. labetalol decreased 200q8, hydralazine decreased 25q8. Gabriel replaced.   10/20: BD20, GEORGE overnight. NGT dislodged, replaced. PEG tomorrow w/ gen surg, FW increased to 150q4 and labetalol decreased to 100q8, lokelma given for hyperkalemia.   10/21: BD 21. POD0 PEG placement with Gen surg. decr labetolol to 50q8, incr. cardura to 0.4, started lokelma and phoslo, dc gabriel POD0 PEG placement with Gen surg.  10/22: BD 22. Plan to start TF today via PEG. dc labetalol, Following ophtho recs. Increased apnea settings - found to be in cheyne-moe respiration. CPAP 5/5.  10/23: BD 23. hydralazine d/c'd, trach collar trial today. Rectal tube placed at 6am.  10/24: BD24, o/n lokelma held due to diarrhea. Free water 100q6 resumed. dc'd tamsulosin, amantadine. Incr'd cardura to 8mg qhs. Dc'd FW. Switched jevity to nepro. gabriel placed for high urine output. Started SL atropine for oral secretions. Dc'd free water.  10/25: BD25, o/n decreased suctioning requirements to > q4hrs, GEORGE. Cr improving, cont phoslo, lokelma held at this time. Gabriel placed yest, cont. Tolerating trach collar. Given 500cc plasmalyte bolus for ANIKA. Dc'd sinemet.   10/26: BD26, o/n resumed lokelma 5mg daily and resumed 100cc free water q6hrs. Change in neuro status with new right pupillary dilation with anisocoria (right pupil 6mm fixed and left pupil 3mm briskly reactive). Given 23.4% NaCl bullet, taken for emergent CTH showing mostly resolved pontine hemorrhage, continued brainstem hypodensity likely edema d/t hemorrhage, no new hemorrhage or infarct, no herniation, mild increase in size of left lateral ventricle. Vitals remaine stable. Na goal > 140.   10/27: BD27, o/n GEORGE.Neuro stable. Pend stepdown with airway bed.   10/28: BD 28. GEORGE overnight. Neuro stable. Miralax ordered. Gabriel removed, pending TOV.  10/29: BD 29. GEORGE o/n. Given 2L NS over 8 hrs for increased BUN/Cr ratio. Gabriel placed for frequent straight cath.   10/30: BD 30.   10/31: BD 31. GEORGE overnight. Na 149, increased free water to 200q6. 1L NS for uptrending BUN.   11/1: BD 32. GEORGE overnight. Given 1L NS for dehydration. Na 146, increased FW to 250q6.   11/2: BD 33 GEORGE overnight, neuro stable, given 500cc bolus for net negative status and tachycardia   11/3: BD 34, GEORGE overnight, neuro stable. Patient remains tachycardic, EKG showing sinus tachycardia, given additional 500cc NS bolus. Febrile to 101.9F, pan cultured (without UA), CXR WNL, given tylenol.   11/4: BD 35, GEORGE overnight, neuro stable. Given 1L NS for tachycardia. sputum (+) for stenotropohomas maltophilia.   11/5: BD 36 GEORGE overnight, neuro stable. Vancomycin dc'd. Chest PT BID. ID consulted, cont zosyn.  11/6: BD 37. blood cx + klebsiella dc zosyn changed to cefepime, CTAP ordered, rpt blood cx sent.    11/7: BD 38. Pending CT A/P, given 250cc bolus and starting maintenance fluids overnight. Pending CT A/P after bolus   11/8: BD 39. CT CAP negative for infection.   11/9: BD 40. GEORGE overnight.  11/10: BD 41. GEORGE overnight. desat to 85 on trach collar, O2 inc to 10L and 100%, O2 sat inc to 95. pt tachy to 110s, euvolemic. given tylenol. ABG and CXR ordered. spiked fever, pancultured, RVP negative. AM ABG w pO2 79, rpt w pO2 79. pt appears comfortable, satting 94%.   11/11: BD 42. GEORGE overnight. pt became tachy to 130s, desat to 90 on 100% FiO2 and 10L. suctioned, (+) productive cough. temp 101.4, given 1g IV tylenol and 500cc NS bolus for euvolemia. fever and HR downtrending. LE dopplers negative for dvt  11/12: BD 43, GEORGE ovn, fever and HR downtrending, satting 97% 70% FIO2  11/13: BD 44, GEORGE ovn. started standing tylenol x24 hours for tachycardia. desat to 80s, o2 increased. CXR stable, pending CTA PE protocol.   11/14: BD 45, GEORGE overnight, neuro stable. resp therapy dec FiO2 to 70%.   11/15: BD 46, GEORGE overnight, neuro stable.  Rapid called for desaturation 30s, tachycardic 140s. Patient bagged, 100% fio2, heavily suctioned. CXR/POCUS unremarkable. ABG c/w desaturation. WBC 14.71. Afebrile. O2 improved to 90s and patient upgraded to ICU. ABG paO2 30s improved to 89 on vent. IV Tylenol x 1, sputum sent. Start protonix while o-n vent.   11/16: BD 47. POCUS showed collapsable IVCF, given 1L bolus. Vanco/Cefpime added empriic for PNA, NGT feeds restarted. MRSA swab neg, Vanc DC'd.   11/17: BD 48. GEORGE overnight. 1l bolus for tachycardia. Spiked to 101, cultured. 500cc bolus for tachycardia, tachy to 148 given 25mcg fentanyl, 250cc albumin, 1.5L bolus. 5 IV lopressor with response HR to 100s. +Stenotrophomonas on sputum cx.   11/18: BD 49. GEORGE overnight. Consulted ID, cefepime switched to bactrim x7days. Started hydrochlorothiazine 12.5mg daily.  11/19: BD 50. Tachy 120s, given tylenol and 500cc NS. Tolerating 5/5, switched to TCx. Holding phos binder. D/c Bactrim. D/c gabriel, f/u TOV. Dc'd PPI.   11/20: BD 51. GEORGE ovn. 1600 satting low 90s, mildly tachy to 110s, afebrile, RR wnl. O2 improved to mid 90s while inc O2 to 100% on TCx. CPAP 5/5 placed back on.  11/21: BD 52, GEORGE ovn, tolerating CPAP 5/5. Switch to trach collar during the day if tolerating well. HCTZ held for Cr bump, straight cath frequence increased to q4  11/22: BD 53, GEORGE ovn. Resumed phoslo. Gabriel placed. Resumed HCTZ.   11/23: BD 54. Holding tylenol in setting of possible fever, will require pan cx if febrile. Cr improved today. Cont CPAP. Bowel regimen held i/s/o diarrhea. FOBT negative.  11/24: BD 55. GEORGE overnight. Neuro stable. HCTZ dc'ed, started lisinopril 5. Lokelma dc'ed for K 3.7.   11/25: BD 56, GEORGE overnight. Neuro stable. dc'd lisinopril 5mg. Gabriel dc'd. TOV. 1545 noted to be hypotensive, MAP 50, in supine position on chair, HR 60s, afebrile, O2 96%. Given 1L cc NS bolus, placed back on bed in reverse trendelenberg, improved to map of 66. Neostick at bedside. Vitals check q1h. Dc'ed amlodipine. Failed TOV, bladder scan q6, sc prn. Added back Senna.   11/26: BD 57, GEORGE overnight. Neuro stable. Dc'd phoslo.   11/27: BD 58, GEORGE overnight. Neuro stable.    11/28: BD 59. Gabriel replaced.   11/29: GEORGE.  11/30: GEORGE, neuro stable.   12/1: BD 62, GEORGE overnight  12/2: BD 63, GEORGE overnight.; Given 1L bolus of LR for uptrending BUN/Cr.  12/3: BD 64. Reinstated eye gtt/moisture chamber given increased Rt eye injection  12/4: BD 65. GEORGE overnight. Attempted to speak with ophtho regarding eyelid weight/closure but no answer, full mailbox.   12/5: BD 66, GEORGE overnight, bowel regimen increased and had BM.   12/6: BD 67, GEORGE overnight, neuro stable.  12/7: GEORGE overnight, neuro stable. /110s, given x1 hydralazine 10 mg IVP. Restarted home amlodipine 5mg.  12/8: GEORGE. OOB to chair.     12/11: GEORGE, mucomyst added for thick secretions, simethicone for abd distension, abd xray with stool burden, increased bowel regimen.   12/12: GEORGE overnight.   12/13: GEORGE overnight.   12/14: GEORGE overnight  12/15: o/n Patient became tachycardic HR 120s and 10 minutes later O2 sat dropped as low as 89%. Patient suctioned without improvement in O2 sat and tube feeds found in suction catheter. TFs held.  STAT CXR ordered. STAT labs sent. Respiratory therapist called to bedside and patient trach connected to ventilator. After connection to ventilator and further suctioning O2 sat improved to 97% but patient HR remains 120-130s. Upgraded to NSICU for further management. Vancomycin and zosyn started. CTA  chest PE protocol and CTH ordered. Blood cultures sent.given 500cc bolus, rpt ABG sent pO2 243, CTH and CTA chest done. FS while NPO, FiO2 dec 50 pending ABG. sputum cx positive for few GPC and GNR.   12/16: GEORGE overnight, restarted amlodipine, troponin 75   12/17: GEORGE overnight, neuro stable. Attempted CPAP this morning, did not tolerate and back on full vent support. Dc'd Vanc. Tachycardia to 140s, noted extremities to be twitching along with jaw twitching. Given 2g of Keppra, total 4mg ativan and placed on EEG, full set of labs and lactate negative. Resumed trickle feeds at 20cc/hr. Given 250cc albumin for tachycardia.   12/18: GEORGE overnight. Neuro stable. CTH stable. EEG negative and dc'd.   12/19: GEORGE overnight. neuro stable. SIMV most of day, AC/VC at night.   12/20: GEORGE overnight, neuro stable. remains on VC/AC  12/21: GEORGE ovn. 1L bolus for tachy to 120s-130s.   12/22: GEORGE ovn. Tachy to 120s, given tylenol and IVF. 2mg IV ativan given for L jaw twitching. Sx resolved for 3 minutes and started again. Epilepsy contacted. Given 2mg IV ativan. Continued twitching. Increased keppra to 1500mg BID, 2mg ativan given. Propranolol started for refractory tachycardia. vEEG ordered. albumin given. POCUS performed, no b lines. Started on fosphenytoin, loaded w 20mg/kg then 100mg TID. febrile, pancx, started cefepime 2g q8, vancomycin  12/23: GEORGE ovn. +focal motor seizures on eeg. ID consulted. Vancomycin dc'ed as per ID.  12/24: GEORGE ovn, neuro stable. Baclofen 5 mg q12 started for hiccups. Na 129 from 134. Urine lytes c/w SIADH. Given 250cc 3% bolus. CT chest for infection w/u - f/u read. Repeat Na 136. UA negative  12/25: GEORGE ovn.   12/26: GEORGE ovn  12/27: GEORGE overnight. Blood cx neg @ 4 days, DC cefepime per medicine (sputum colonized).   12/28: Desaturation o/n to 80's, CXR obtained, pulse ox changed and sats resolved. Na 133 from 138, 250cc 3% bolus given. Cefepime resumed until 12/30 per ID. Rpt Na 138.  12/29: GEORGE overnight. Na stable.   12/30: GEORGE overnight. Family meeting with son, pt now DNR.   12/31: GEORGE overnight.   1/1: GEORGE overnight, neuro stable.  1/2: GEORGE overnight, neuro stable. FW decreased to 100q6.   1/3: GEORGE overnight, neuro stable. fosphenytoin IV changed to pheytoin PO via PEG per epilepsy recommendations  1/4: GEORGE overnight, neuro stable. FW incr. to 100q4  1/5: GEORGE overnight, neuro stable.   1/6: GEORGE overnight, neuro stable   1/7: GEORGE overnight, neuro stable. BUN/Cr increased, increased FW to 200q6. Given 1L bolus of LR over 2 hours. Gabriel d/c'd, voiding, continue bladder scan q6.   1/8: GEORGE overnight, neuro stable. BUN/Cr improving.  1/9: GEORGE overnight, neuro stable.   1/10: GEORGE overnight, neuro stable.   1/11: GEORGE overnight, neuro stable, vent settings stable.   1/12: GEORGE overnight, neuro stable. Febrile to 102F, f/u pan cx, chest xray, given tylenol. Zosyn started.   1/13: GEORGE overnight, neuro stable, cont Zosyn for presumed PNA, prelim sputum cx growing; few GS neg diplococci, mod GS neg rods, rare GS + rods, fever trend improved. Free water increased to [id number].   1/14: GEORGE overnight. pending renal US for elevated Cr  1/15: GEORGE ovn. renal US complete.   1/16: GEORGE overnight, neuro stable   1/17: GEORGE overnight, neuro stable   1/18: GEORGE overnight, neuro stable.   1/19: GEORGE overnight, neuro stable. Propranolol dec to 5q8.   1/20: GEORGE overnight, neuro stable. Weaned propranolol to 5mg BID.   1/21: GEORGE ovn, in AM having rapid vertical eye movements with facial twitching, 2g total keppra given for AM dose and phenytoin level ordered, vital signs stable. CTH stable. Phenytoin increased to 100/100/150mg per epilepsy  1/22: GEORGE ovn. IV hydral x 1 for SBP 170s.   1/23: Cont to have focal motor seizures. Per epilepsy, changed phenytoin dose to 100/100/200.   1/24: Phenytoin lvl tmrw AM. Chest Xray completed due to temp 100.2F  1/25: GEORGE overnight. Incr dilantin morning and afternoon per epilepsy.   1/26: GEORGE overnight. Sustained focal twitching noticed by nursing. Ativan 8mg in total given. Fosphenytoin 1500mg IV given. Placed on EEG. Epilepsy following. TFs held. Phenytoin increased to 150/150/200.   1/27: o/n CTH completed due to continued lethargy after ativan given yesterday afternoon. TFs resumed. 500cc bolus NS given for SBP 90s. EEG dc'ed, discussed w/ Dr. Tomlin. Febrile 101F, pan cx sent. Likely left sided infiltrate on CXR, c/f aspiration PNA. Started on vanc/zosyn. MRSA swab pending.   1/28: Neuro stable, on vanc/zosyn. +UTI on UA, MRSA negative. Vanc dc'd. RVP negative. Zosyn increased to pseudomonal dosing.   1/29: GEORGE overnight, neuro stable.  1/30: GEORGE overnight, neuro stable. Dc'd zosyn due to resistance, switched to Levaquin.  1/31: GEORGE ovenight, neuro stable.   2/1: Brief desat. Improved with neb and reposititioning. ABG and POCUS wnl.   2/4: GEORGE overnight  2/5: GEORGE overnight    Vital Signs Last 24 Hrs  T(C): 37.6 (04 Feb 2025 22:05), Max: 37.6 (04 Feb 2025 22:05)  T(F): 99.7 (04 Feb 2025 22:05), Max: 99.7 (04 Feb 2025 22:05)  HR: 102 (04 Feb 2025 23:33) (74 - 112)  BP: 114/79 (04 Feb 2025 23:33) (114/79 - 131/86)  BP(mean): 93 (04 Feb 2025 23:33) (92 - 104)  RR: 17 (04 Feb 2025 23:33) (14 - 18)  SpO2: 94% (04 Feb 2025 23:33) (93% - 100%)    Parameters below as of 04 Feb 2025 23:33  Patient On (Oxygen Delivery Method): ventilator    O2 Concentration (%): 40    I&O's Summary    03 Feb 2025 07:01  -  04 Feb 2025 07:00  --------------------------------------------------------  IN: 1680 mL / OUT: 1685 mL / NET: -5 mL    04 Feb 2025 07:01  -  05 Feb 2025 00:13  --------------------------------------------------------  IN: 1700 mL / OUT: 480 mL / NET: 1220 mL        PHYSICAL EXAM:  General: patient seen laying supine in bed in NAD, Y[place]ut speaking  Neuro: opens eyes, tracks vertically, RUE squeezes hand to command, RLE wiggles toes to command, b/l lower extremities withdraw to noxious,  LUE 0/5  HEENT: PERRL, +trach to vent  Neck: supple  Cardiac: RRR, S1S2  Pulmonary: chest rise symmetric  Abdomen: soft, nontender, nondistended, +PEG  Ext: perfusing well  Skin: warm, dry    LABS:                        10.9   6.82  )-----------( 241      ( 04 Feb 2025 06:30 )             34.6     02-04    139  |  102  |  45[H]  ----------------------------<  125[H]  3.9   |  23  |  1.23    Ca    9.1      04 Feb 2025 06:30  Phos  3.9     02-04  Mg     2.2     02-04        Urinalysis Basic - ( 04 Feb 2025 06:30 )    Color: x / Appearance: x / SG: x / pH: x  Gluc: 125 mg/dL / Ketone: x  / Bili: x / Urobili: x   Blood: x / Protein: x / Nitrite: x   Leuk Esterase: x / RBC: x / WBC x   Sq Epi: x / Non Sq Epi: x / Bacteria: x          CAPILLARY BLOOD GLUCOSE          Drug Levels: [] N/A    CSF Analysis: [] N/A      Allergies    Allergy Status Unknown    Intolerances      MEDICATIONS:  Antibiotics:    Neuro:  acetaminophen   Oral Liquid .. 650 milliGRAM(s) Oral every 6 hours PRN  baclofen 5 milliGRAM(s) Oral every 12 hours  levETIRAcetam 1500 milliGRAM(s) Oral every 12 hours  LORazepam   Injectable 2 milliGRAM(s) IV Push once PRN  phenytoin   Suspension 150 milliGRAM(s) Oral <User Schedule>  phenytoin   Suspension 200 milliGRAM(s) Oral <User Schedule>    Anticoagulation:  heparin   Injectable 5000 Unit(s) SubCutaneous every 8 hours    OTHER:  acetylcysteine 10%  Inhalation 4 milliLiter(s) Inhalation every 6 hours  albuterol/ipratropium for Nebulization 3 milliLiter(s) Nebulizer every 6 hours  amLODIPine   Tablet 5 milliGRAM(s) Oral daily  artificial tears (preservative free) Ophthalmic Solution 1 Drop(s) Right EYE every 4 hours  chlorhexidine 0.12% Liquid 15 milliLiter(s) Oral Mucosa every 12 hours  chlorhexidine 2% Cloths 1 Application(s) Topical <User Schedule>  doxazosin 8 milliGRAM(s) Oral at bedtime  erythromycin   Ointment 1 Application(s) Right EYE at bedtime  fluticasone propionate 50 MICROgram(s)/spray Nasal Spray 1 Spray(s) Both Nostrils two times a day  influenza   Vaccine 0.5 milliLiter(s) IntraMuscular once  ofloxacin 0.3% Solution 1 Drop(s) Right EYE four times a day  pantoprazole   Suspension 40 milliGRAM(s) Oral daily  petrolatum Ophthalmic Ointment 1 Application(s) Both EYES two times a day  propranolol 5 milliGRAM(s) Oral every 12 hours  sodium chloride 0.65% Nasal 1 Spray(s) Both Nostrils two times a day    IVF:    CULTURES:  Culture Results:   No growth at 48 Hours (02-01 @ 19:45)  Culture Results:   >100,000 CFU/ml Klebsiella aerogenes (Previously Enterobacter) (01-27 @ 23:20)            ASSESSMENT:  44M PMH ?stroke/MI present to [facility] after collapsing at work. Decorticate posturing, vomiting, intubated for airway protection. 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 Transferred to [facility] for further management. s/p trach 10/14. s/p peg 10/21. Re-upgrade to ICU 2/2 desaturation event and suctioning requirements 11/15. Re-upgrade to NSICU 12/15 2/2 desaturation and tachycardia.    Neuro:  - neuro/vitals q4h  - pain control: tylenol prn  - seizure tx: keppra 1500mg BID, phenytoin 150/150/200  - s/p vEEG (1/26-1/27)vEEG (10/3-4) negative, (10/17-10/19) negative, (12/17-12/18) negative, (12/22-12/25), (1/26-1/28),  +focal motor seizures not correlating w/ EEG,  - CTH 9/30: enlarged pontine hemorrhage, CTH 10/3: stable, CTH 10/25: mostly resolved pontine hemorrhage, CTH 12/15: R mastoid air cell opacification; acute otitis media vs sterile effusion, CTH 12/18: stable. CTH 1/21 stable, CTH 1/27 stable  - MRI brain 10/12: parenchymal hemorrhage, acute/subacute R cerebellar stroke      CV:  - -160  - tachycardia: propranolol 5mg BID   - HTN: amlodipine 5mg  - echo (9/30) EF 75%, repeat 12/16: 57%    Resp:  - trach to vent, AC/VC 40/400/14/6  - Secretions: duonebs/mucomyst/chest PT q6h     GI:  - TFs via PEG  - bowel regimen held for loose stool, last BM 2/4  - baclofen 5q12 for hiccups    Renal:  - FW 200cc q4h for hydration  - Urinary retenion: Gabriel removed 1/7, SC prn  - CKD: trend BUN/Cr  - renal US 10/1, 10/8, 1/15: Increased bilateral renal echogenicity consistent with medical renal disease    Endo:  - A1c 5.4    Heme:  - DVT ppx: SCDs, SQH 5000u q8h   - LE dopplers negative 12/16    ID:  - last pan cx 1/27  - +klebsiella UTI s/p levaquin (1/30-2/3)  - empiric PNA: cefepime (12/22-12/30), s/p vanc (12/22-12/23), s/p zosyn (12/15-12/20), s/p vanc (12/15-12/16), s/p zosyn (1/12 -1/18)  - s/p Stenotrophomonas maltophilia PNA: s/p Bactrim (11/18-11/19) s/p Cefepime (11/16-11/18)  - 11/3, (+) sputum for stenotrophomas maltophlia, blood cx (+) klebsiella, cefepime 2gq12 (11/6 - 11/12)   - S/p Ancef (10/4-10/14) for PNA, and s/p Unasyn (10/18-10/23) +actinobacter baumanii     MISC:  - ophtho consult for keratitis  - erythromycin ointment R eye q4hrs, ofloxacin ointment R eye QID, artificial tears R eye q2hrs, moisture chamber at bedtime    Dispo: SDU status, DNR, pending PRUCOL for benefits     D/w Dr. D'Amico

## 2025-02-06 PROCEDURE — 99233 SBSQ HOSP IP/OBS HIGH 50: CPT

## 2025-02-06 RX ADMIN — Medication 1 DROP(S): at 03:11

## 2025-02-06 RX ADMIN — Medication 150 MILLIGRAM(S): at 04:49

## 2025-02-06 RX ADMIN — Medication 15 MILLILITER(S): at 05:18

## 2025-02-06 RX ADMIN — Medication 1 SPRAY(S): at 05:24

## 2025-02-06 RX ADMIN — Medication 1 DROP(S): at 21:35

## 2025-02-06 RX ADMIN — HEPARIN SODIUM 5000 UNIT(S): 1000 INJECTION INTRAVENOUS; SUBCUTANEOUS at 13:44

## 2025-02-06 RX ADMIN — LEVETIRACETAM 1500 MILLIGRAM(S): 10 INJECTION, SOLUTION INTRAVENOUS at 05:18

## 2025-02-06 RX ADMIN — Medication 40 MILLIGRAM(S): at 11:02

## 2025-02-06 RX ADMIN — DOXAZOSIN MESYLATE 8 MILLIGRAM(S): 8 TABLET ORAL at 21:20

## 2025-02-06 RX ADMIN — Medication 15 MILLILITER(S): at 17:22

## 2025-02-06 RX ADMIN — Medication 1 DROP(S): at 05:24

## 2025-02-06 RX ADMIN — Medication 200 MILLIGRAM(S): at 19:10

## 2025-02-06 RX ADMIN — IPRATROPIUM BROMIDE AND ALBUTEROL SULFATE 3 MILLILITER(S): .5; 2.5 SOLUTION RESPIRATORY (INHALATION) at 11:02

## 2025-02-06 RX ADMIN — AMLODIPINE BESYLATE 5 MILLIGRAM(S): 10 TABLET ORAL at 10:06

## 2025-02-06 RX ADMIN — LEVETIRACETAM 1500 MILLIGRAM(S): 10 INJECTION, SOLUTION INTRAVENOUS at 17:24

## 2025-02-06 RX ADMIN — ERYTHROMYCIN 1 APPLICATION(S): 5 OINTMENT OPHTHALMIC at 21:19

## 2025-02-06 RX ADMIN — IPRATROPIUM BROMIDE AND ALBUTEROL SULFATE 3 MILLILITER(S): .5; 2.5 SOLUTION RESPIRATORY (INHALATION) at 17:22

## 2025-02-06 RX ADMIN — HEPARIN SODIUM 5000 UNIT(S): 1000 INJECTION INTRAVENOUS; SUBCUTANEOUS at 05:19

## 2025-02-06 RX ADMIN — FLUTICASONE PROPIONATE 1 SPRAY(S): 50 SPRAY, METERED NASAL at 17:27

## 2025-02-06 RX ADMIN — ACETYLCYSTEINE 4 MILLILITER(S): 200 INHALANT RESPIRATORY (INHALATION) at 00:00

## 2025-02-06 RX ADMIN — OFLOXACIN 1 DROP(S): 3 SOLUTION OPHTHALMIC at 05:25

## 2025-02-06 RX ADMIN — Medication 150 MILLIGRAM(S): at 11:21

## 2025-02-06 RX ADMIN — ACETYLCYSTEINE 4 MILLILITER(S): 200 INHALANT RESPIRATORY (INHALATION) at 23:51

## 2025-02-06 RX ADMIN — ACETYLCYSTEINE 4 MILLILITER(S): 200 INHALANT RESPIRATORY (INHALATION) at 17:22

## 2025-02-06 RX ADMIN — OFLOXACIN 1 DROP(S): 3 SOLUTION OPHTHALMIC at 17:25

## 2025-02-06 RX ADMIN — Medication 1 DROP(S): at 10:04

## 2025-02-06 RX ADMIN — IPRATROPIUM BROMIDE AND ALBUTEROL SULFATE 3 MILLILITER(S): .5; 2.5 SOLUTION RESPIRATORY (INHALATION) at 05:18

## 2025-02-06 RX ADMIN — ACETYLCYSTEINE 4 MILLILITER(S): 200 INHALANT RESPIRATORY (INHALATION) at 05:22

## 2025-02-06 RX ADMIN — Medication 1 SPRAY(S): at 17:26

## 2025-02-06 RX ADMIN — ACETYLCYSTEINE 4 MILLILITER(S): 200 INHALANT RESPIRATORY (INHALATION) at 11:02

## 2025-02-06 RX ADMIN — BACLOFEN 5 MILLIGRAM(S): 10 INJECTION INTRATHECAL at 17:24

## 2025-02-06 RX ADMIN — Medication 1 DROP(S): at 17:26

## 2025-02-06 RX ADMIN — HEPARIN SODIUM 5000 UNIT(S): 1000 INJECTION INTRAVENOUS; SUBCUTANEOUS at 21:20

## 2025-02-06 RX ADMIN — Medication 1 APPLICATION(S): at 18:50

## 2025-02-06 RX ADMIN — IPRATROPIUM BROMIDE AND ALBUTEROL SULFATE 3 MILLILITER(S): .5; 2.5 SOLUTION RESPIRATORY (INHALATION) at 00:00

## 2025-02-06 RX ADMIN — Medication 1 APPLICATION(S): at 05:23

## 2025-02-06 RX ADMIN — Medication 1 APPLICATION(S): at 06:15

## 2025-02-06 RX ADMIN — OFLOXACIN 1 DROP(S): 3 SOLUTION OPHTHALMIC at 11:02

## 2025-02-06 RX ADMIN — IPRATROPIUM BROMIDE AND ALBUTEROL SULFATE 3 MILLILITER(S): .5; 2.5 SOLUTION RESPIRATORY (INHALATION) at 23:51

## 2025-02-06 RX ADMIN — FLUTICASONE PROPIONATE 1 SPRAY(S): 50 SPRAY, METERED NASAL at 05:24

## 2025-02-06 RX ADMIN — BACLOFEN 5 MILLIGRAM(S): 10 INJECTION INTRATHECAL at 05:19

## 2025-02-06 RX ADMIN — Medication 1 DROP(S): at 13:44

## 2025-02-06 NOTE — PROGRESS NOTE ADULT - SUBJECTIVE AND OBJECTIVE BOX
HPI:  Unknown age male, unknown past medical history (reported stroke and MI by coworkers) presented to [facility] with AMS, Pt was working at [email or web address] when he was found down by coworkers. EMS called and pt brought to [facility] ED. Intubated, sedated, started on cardene for SBPs in 200s. CT head showed brain stem bleed. Transferred to NSICU for further management.  (30 Sep 2024 12:55)    INTERVAL EVENTS: GEORGE ovn    Hospital course:   9/30: transferred from [facility]. A line placed. Versed dc'd. Cy Rader at bedside, states pt has family in [place], cannot confirm medications or PMH other than stroke and MI. 250cc bolus 3% given. LR switched to NS. hydralazine 25q8 started, 3% started, switched propofol to precedex   10/1: stability CTH done. Added labetalol, started TF. Palliative consulted. ethics consulted to determine surrogate. febrile 103, pan cx sent  10/2: BD 2, GEORGE overnight. TF resumed. Desatt'd to 80s, FiO2 inc. to 50. Fentanyl given, ABG, CXR ordered. Maxxed on precedex, started on propofol for DARIEN -4 - -5. Precedex dc'd. Duonebs, mucomyst, hypertonic added. 3% dc'd. Cardene dc'd. Start vanc/CTX. Increased labetalol 200q8. MRSA negative, dc'd vanc. ETT pulled back 2cm x 2, good positioning after confirmatory chest xray. Ethics attempting to establish HCP with family. Na 159, starting FW 250q6 for range 150-155.   10/3: BD3, GEORGE o/n, neuro stable. Na elevating, FW increased to 300q6. Dc'd bowel reg for diarrhea. vEEG started. SQH 5000q8 tonight.   10/4: BD 4, albumn bolus, incr. LR to 80 2/2 incr. in Cr, LR to 10 0cc/hr for uptrending Cr. Started 7% hypersal for 48hrs and SL atropine for inline/oral thick secretions. Dc'd CTX and started ancef for MSSA in the sputum. Nephrology consulted for CKD, f/u recs. SBP 170s, given hydralazine 10mg IVP.   10/5: BD5, o/n 10mg IVP hydralazine given for SBP 170s and started on hydralazine 25q8 via OGT. 10mg IV push labetalol for SBP > 160s. RT placed for diarrhea.   10/6: BD6, o/n FW increased to 350q4 per nephrology recs. IV tylenol for temp 100.6, SBp 160s presumed uncomfortable.   10/7: BD7, overnight pancultured for temp 101.8F.   10/8: BD8. GEORGE. Cr bumped. decreased LR to 75cc/hr. Adding simethicone ATC. incr hydralazine 50mgTID. Incr labetalol 300mgTID. Na 145, decreased FWF to 250q6. Start precedex. FENa consistent with intrinsic kidney injury. Pend repeat renal US. Retaining up to 1.3L, bladder scans q6, straight cath PRN  10/9: BD 9. GEORGE overnight. Neuro stable. abd xray for distention w non-specific gas pattern, OGT to LIWS for morning. duonebs/mucomyst to q8 for improving secretions. Changed tube feeds to Jevity 1.5 20cc/hr, low rate due to abdominal distention, nepro dense and more difficult to digest. Tolerating CPAP, confirmed by ABG.   10/10: BD 10. GEORGE overnight. Neuro stable. (+) gabriel for urinary retention on bladder scan. inc TF to goal rate of 40cc/hr. family leaning toward pursuing trach/PEG. 1/2 amp for FS 81.   10/11: BD 11. GEORGE overnight. Neuro stable. Trach/PEG consults placed.   10/12: BD 12. GEORGE overnight. Neuro stable. MRI brain complete.   10/13: BD 13. Increase flomax. Hold SQH after PM dose for trach tm. IVL.   10/14: BD 14. GEORGE overnight, remains on AC/VC. Gabriel placed for urinary retention. Dc'd free water.  S/p trach with pulm. NGT placed and CXR confirmed in good position.   10/15: BD 15, GEORGE ovn. resumed feeds. spiked 101, pan cx sent.   10/16: BD 16. GEORGE ovn. Lokelma 5mg for K+ 5.4. Started vanc q 24/zosyn for empiric PNA coverage, IVF to 100/hr. PEG held for fever.   10/17: BD 17,  ordered serum osm and urine osm for am. Started sinemet for neurostimulation. Increased cardura to 0.8. Started FW 100q4, dc'd IVF. MRSA negative, dc'd vanc. NGT replaced d/t coiling.   10/18: BD 18, GEORGE overnight, neuro stable. Amantadine added for neurostim. zosyn changed to unasyn for acinetobacter baumannii, failed TOV and required SC  10/19: BD 19, GEORGE ovn. cardura 2mg added for retention. labetalol decreased 200q8, hydralazine decreased 25q8. Gabriel replaced.   10/20: BD20, GEORGE overnight. NGT dislodged, replaced. PEG tomorrow w/ gen surg, FW increased to 150q4 and labetalol decreased to 100q8, lokelma given for hyperkalemia.   10/21: BD 21. POD0 PEG placement with Gen surg. decr labetolol to 50q8, incr. cardura to 0.4, started lokelma and phoslo, dc gabriel POD0 PEG placement with Gen surg.  10/22: BD 22. Plan to start TF today via PEG. dc labetalol, Following ophtho recs. Increased apnea settings - found to be in cheyne-moe respiration. CPAP 5/5.  10/23: BD 23. hydralazine d/c'd, trach collar trial today. Rectal tube placed at 6am.  10/24: BD24, o/n lokelma held due to diarrhea. Free water 100q6 resumed. dc'd tamsulosin, amantadine. Incr'd cardura to 8mg qhs. Dc'd FW. Switched jevity to nepro. gabriel placed for high urine output. Started SL atropine for oral secretions. Dc'd free water.  10/25: BD25, o/n decreased suctioning requirements to > q4hrs, GEORGE. Cr improving, cont phoslo, lokelma held at this time. Gabriel placed yest, cont. Tolerating trach collar. Given 500cc plasmalyte bolus for ANIKA. Dc'd sinemet.   10/26: BD26, o/n resumed lokelma 5mg daily and resumed 100cc free water q6hrs. Change in neuro status with new right pupillary dilation with anisocoria (right pupil 6mm fixed and left pupil 3mm briskly reactive). Given 23.4% NaCl bullet, taken for emergent CTH showing mostly resolved pontine hemorrhage, continued brainstem hypodensity likely edema d/t hemorrhage, no new hemorrhage or infarct, no herniation, mild increase in size of left lateral ventricle. Vitals remaine stable. Na goal > 140.   10/27: BD27, o/n GEORGE.Neuro stable. Pend stepdown with airway bed.   10/28: BD 28. GEORGE overnight. Neuro stable. Miralax ordered. Gabriel removed, pending TOV.  10/29: BD 29. GEORGE o/n. Given 2L NS over 8 hrs for increased BUN/Cr ratio. Gabriel placed for frequent straight cath.   10/30: BD 30.   10/31: BD 31. GEORGE overnight. Na 149, increased free water to 200q6. 1L NS for uptrending BUN.   11/1: BD 32. GEORGE overnight. Given 1L NS for dehydration. Na 146, increased FW to 250q6.   11/2: BD 33 GEORGE overnight, neuro stable, given 500cc bolus for net negative status and tachycardia   11/3: BD 34, GEORGE overnight, neuro stable. Patient remains tachycardic, EKG showing sinus tachycardia, given additional 500cc NS bolus. Febrile to 101.9F, pan cultured (without UA), CXR WNL, given tylenol.   11/4: BD 35, GEORGE overnight, neuro stable. Given 1L NS for tachycardia. sputum (+) for stenotropohomas maltophilia.   11/5: BD 36 GEORGE overnight, neuro stable. Vancomycin dc'd. Chest PT BID. ID consulted, cont zosyn.  11/6: BD 37. blood cx + klebsiella dc zosyn changed to cefepime, CTAP ordered, rpt blood cx sent.    11/7: BD 38. Pending CT A/P, given 250cc bolus and starting maintenance fluids overnight. Pending CT A/P after bolus   11/8: BD 39. CT CAP negative for infection.   11/9: BD 40. GEORGE overnight.  11/10: BD 41. GEORGE overnight. desat to 85 on trach collar, O2 inc to 10L and 100%, O2 sat inc to 95. pt tachy to 110s, euvolemic. given tylenol. ABG and CXR ordered. spiked fever, pancultured, RVP negative. AM ABG w pO2 79, rpt w pO2 79. pt appears comfortable, satting 94%.   11/11: BD 42. GEORGE overnight. pt became tachy to 130s, desat to 90 on 100% FiO2 and 10L. suctioned, (+) productive cough. temp 101.4, given 1g IV tylenol and 500cc NS bolus for euvolemia. fever and HR downtrending. LE dopplers negative for dvt  11/12: BD 43, GEORGE ovn, fever and HR downtrending, satting 97% 70% FIO2  11/13: BD 44, GEORGE ovn. started standing tylenol x24 hours for tachycardia. desat to 80s, o2 increased. CXR stable, pending CTA PE protocol.   11/14: BD 45, GEORGE overnight, neuro stable. resp therapy dec FiO2 to 70%.   11/15: BD 46, GEORGE overnight, neuro stable.  Rapid called for desaturation 30s, tachycardic 140s. Patient bagged, 100% fio2, heavily suctioned. CXR/POCUS unremarkable. ABG c/w desaturation. WBC 14.71. Afebrile. O2 improved to 90s and patient upgraded to ICU. ABG paO2 30s improved to 89 on vent. IV Tylenol x 1, sputum sent. Start protonix while o-n vent.   11/16: BD 47. POCUS showed collapsable IVCF, given 1L bolus. Vanco/Cefpime added empriic for PNA, NGT feeds restarted. MRSA swab neg, Vanc DC'd.   11/17: BD 48. GEORGE overnight. 1l bolus for tachycardia. Spiked to 101, cultured. 500cc bolus for tachycardia, tachy to 148 given 25mcg fentanyl, 250cc albumin, 1.5L bolus. 5 IV lopressor with response HR to 100s. +Stenotrophomonas on sputum cx.   11/18: BD 49. GEORGE overnight. Consulted ID, cefepime switched to bactrim x7days. Started hydrochlorothiazine 12.5mg daily.  11/19: BD 50. Tachy 120s, given tylenol and 500cc NS. Tolerating 5/5, switched to TCx. Holding phos binder. D/c Bactrim. D/c gabriel, f/u TOV. Dc'd PPI.   11/20: BD 51. GEORGE ovn. 1600 satting low 90s, mildly tachy to 110s, afebrile, RR wnl. O2 improved to mid 90s while inc O2 to 100% on TCx. CPAP 5/5 placed back on.  11/21: BD 52, GEORGE ovn, tolerating CPAP 5/5. Switch to trach collar during the day if tolerating well. HCTZ held for Cr bump, straight cath frequence increased to q4  11/22: BD 53, GEORGE ovn. Resumed phoslo. Gabriel placed. Resumed HCTZ.   11/23: BD 54. Holding tylenol in setting of possible fever, will require pan cx if febrile. Cr improved today. Cont CPAP. Bowel regimen held i/s/o diarrhea. FOBT negative.  11/24: BD 55. GEORGE overnight. Neuro stable. HCTZ dc'ed, started lisinopril 5. Lokelma dc'ed for K 3.7.   11/25: BD 56, GEORGE overnight. Neuro stable. dc'd lisinopril 5mg. Gabriel dc'd. TOV. 1545 noted to be hypotensive, MAP 50, in supine position on chair, HR 60s, afebrile, O2 96%. Given 1L cc NS bolus, placed back on bed in reverse trendelenberg, improved to map of 66. Neostick at bedside. Vitals check q1h. Dc'ed amlodipine. Failed TOV, bladder scan q6, sc prn. Added back Senna.   11/26: BD 57, GEORGE overnight. Neuro stable. Dc'd phoslo.   11/27: BD 58, GEORGE overnight. Neuro stable.    11/28: BD 59. Gabriel replaced.   11/29: GEORGE.  11/30: GEORGE, neuro stable.   12/1: BD 62, GEORGE overnight  12/2: BD 63, GEORGE overnight.; Given 1L bolus of LR for uptrending BUN/Cr.  12/3: BD 64. Reinstated eye gtt/moisture chamber given increased Rt eye injection  12/4: BD 65. GEORGE overnight. Attempted to speak with ophtho regarding eyelid weight/closure but no answer, full mailbox.   12/5: BD 66, GEORGE overnight, bowel regimen increased and had BM.   12/6: BD 67, GEORGE overnight, neuro stable.  12/7: GEORGE overnight, neuro stable. /110s, given x1 hydralazine 10 mg IVP. Restarted home amlodipine 5mg.  12/8: GEORGE. OOB to chair.     12/11: GEORGE, mucomyst added for thick secretions, simethicone for abd distension, abd xray with stool burden, increased bowel regimen.   12/12: GEORGE overnight.   12/13: GEORGE overnight.   12/14: GEORGE overnight  12/15: o/n Patient became tachycardic HR 120s and 10 minutes later O2 sat dropped as low as 89%. Patient suctioned without improvement in O2 sat and tube feeds found in suction catheter. TFs held.  STAT CXR ordered. STAT labs sent. Respiratory therapist called to bedside and patient trach connected to ventilator. After connection to ventilator and further suctioning O2 sat improved to 97% but patient HR remains 120-130s. Upgraded to NSICU for further management. Vancomycin and zosyn started. CTA  chest PE protocol and CTH ordered. Blood cultures sent.given 500cc bolus, rpt ABG sent pO2 243, CTH and CTA chest done. FS while NPO, FiO2 dec 50 pending ABG. sputum cx positive for few GPC and GNR.   12/16: GEORGE overnight, restarted amlodipine, troponin 75   12/17: GEORGE overnight, neuro stable. Attempted CPAP this morning, did not tolerate and back on full vent support. Dc'd Vanc. Tachycardia to 140s, noted extremities to be twitching along with jaw twitching. Given 2g of Keppra, total 4mg ativan and placed on EEG, full set of labs and lactate negative. Resumed trickle feeds at 20cc/hr. Given 250cc albumin for tachycardia.   12/18: GEORGE overnight. Neuro stable. CTH stable. EEG negative and dc'd.   12/19: GEORGE overnight. neuro stable. SIMV most of day, AC/VC at night.   12/20: GEORGE overnight, neuro stable. remains on VC/AC  12/21: GEORGE ovn. 1L bolus for tachy to 120s-130s.   12/22: EGORGE ovn. Tachy to 120s, given tylenol and IVF. 2mg IV ativan given for L jaw twitching. Sx resolved for 3 minutes and started again. Epilepsy contacted. Given 2mg IV ativan. Continued twitching. Increased keppra to 1500mg BID, 2mg ativan given. Propranolol started for refractory tachycardia. vEEG ordered. albumin given. POCUS performed, no b lines. Started on fosphenytoin, loaded w 20mg/kg then 100mg TID. febrile, pancx, started cefepime 2g q8, vancomycin  12/23: GEORGE ovn. +focal motor seizures on eeg. ID consulted. Vancomycin dc'ed as per ID.  12/24: GEORGE ovn, neuro stable. Baclofen 5 mg q12 started for hiccups. Na 129 from 134. Urine lytes c/w SIADH. Given 250cc 3% bolus. CT chest for infection w/u - f/u read. Repeat Na 136. UA negative  12/25: GEORGE ovn.   12/26: GEORGE ovn  12/27: GEORGE overnight. Blood cx neg @ 4 days, DC cefepime per medicine (sputum colonized).   12/28: Desaturation o/n to 80's, CXR obtained, pulse ox changed and sats resolved. Na 133 from 138, 250cc 3% bolus given. Cefepime resumed until 12/30 per ID. Rpt Na 138.  12/29: GEORGE overnight. Na stable.   12/30: GEORGE overnight. Family meeting with son, pt now DNR.   12/31: GEORGE overnight.   1/1: GEORGE overnight, neuro stable.  1/2: GEORGE overnight, neuro stable. FW decreased to 100q6.   1/3: GEORGE overnight, neuro stable. fosphenytoin IV changed to pheytoin PO via PEG per epilepsy recommendations  1/4: GEORGE overnight, neuro stable. FW incr. to 100q4  1/5: GEORGE overnight, neuro stable.   1/6: GEORGE overnight, neuro stable   1/7: GEORGE overnight, neuro stable. BUN/Cr increased, increased FW to 200q6. Given 1L bolus of LR over 2 hours. Gabriel d/c'd, voiding, continue bladder scan q6.   1/8: GEORGE overnight, neuro stable. BUN/Cr improving.  1/9: GEORGE overnight, neuro stable.   1/10: GEORGE overnight, neuro stable.   1/11: GEORGE overnight, neuro stable, vent settings stable.   1/12: GEORGE overnight, neuro stable. Febrile to 102F, f/u pan cx, chest xray, given tylenol. Zosyn started.   1/13: GEORGE overnight, neuro stable, cont Zosyn for presumed PNA, prelim sputum cx growing; few GS neg diplococci, mod GS neg rods, rare GS + rods, fever trend improved. Free water increased to [id number].   1/14: GEORGE overnight. pending renal US for elevated Cr  1/15: GEORGE ovn. renal US complete.   1/16: GEORGE overnight, neuro stable   1/17: GEORGE overnight, neuro stable   1/18: GEORGE overnight, neuro stable.   1/19: GEORGE overnight, neuro stable. Propranolol dec to 5q8.   1/20: GEORGE overnight, neuro stable. Weaned propranolol to 5mg BID.   1/21: GEORGE ovn, in AM having rapid vertical eye movements with facial twitching, 2g total keppra given for AM dose and phenytoin level ordered, vital signs stable. CTH stable. Phenytoin increased to 100/100/150mg per epilepsy  1/22: GEORGE ovn. IV hydral x 1 for SBP 170s.   1/23: Cont to have focal motor seizures. Per epilepsy, changed phenytoin dose to 100/100/200.   1/24: Phenytoin lvl tmrw AM. Chest Xray completed due to temp 100.2F  1/25: GEORGE overnight. Incr dilantin morning and afternoon per epilepsy.   1/26: GEORGE overnight. Sustained focal twitching noticed by nursing. Ativan 8mg in total given. Fosphenytoin 1500mg IV given. Placed on EEG. Epilepsy following. TFs held. Phenytoin increased to 150/150/200.   1/27: o/n CTH completed due to continued lethargy after ativan given yesterday afternoon. TFs resumed. 500cc bolus NS given for SBP 90s. EEG dc'ed, discussed w/ Dr. Tomlin. Febrile 101F, pan cx sent. Likely left sided infiltrate on CXR, c/f aspiration PNA. Started on vanc/zosyn. MRSA swab pending.   1/28: Neuro stable, on vanc/zosyn. +UTI on UA, MRSA negative. Vanc dc'd. RVP negative. Zosyn increased to pseudomonal dosing.   1/29: GEORGE overnight, neuro stable.  1/30: GEORGE overnight, neuro stable. Dc'd zosyn due to resistance, switched to Levaquin.  1/31: GEORGE ovenight, neuro stable.   2/1: Brief desat. Improved with neb and reposititioning. ABG and POCUS wnl.   2/4: GEORGE overnight  2/5: GEORGE overnight  2/6: GEORGE ovn    Vital Signs Last 24 Hrs  T(C): 36.8 (05 Feb 2025 21:21), Max: 37.5 (05 Feb 2025 04:40)  T(F): 98.3 (05 Feb 2025 21:21), Max: 99.5 (05 Feb 2025 04:40)  HR: 76 (05 Feb 2025 20:20) (70 - 98)  BP: 103/66 (05 Feb 2025 20:20) (98/61 - 121/83)  BP(mean): 80 (05 Feb 2025 20:20) (74 - 98)  RR: 18 (05 Feb 2025 20:20) (14 - 18)  SpO2: 98% (05 Feb 2025 20:20) (95% - 99%)    Parameters below as of 05 Feb 2025 20:20  Patient On (Oxygen Delivery Method): ventilator    O2 Concentration (%): 40    I&O's Summary    04 Feb 2025 07:01  -  05 Feb 2025 07:00  --------------------------------------------------------  IN: 2140 mL / OUT: 1230 mL / NET: 910 mL    05 Feb 2025 07:01  -  05 Feb 2025 23:35  --------------------------------------------------------  IN: 1200 mL / OUT: 450 mL / NET: 750 mL        PHYSICAL EXAM:  General: patient seen laying supine in bed in NAD, [place] speaking  Neuro: opens eyes spontaneously, A+Ox3 to person, place and time to R hand squeeze, tracks vertically, RUE squeezes hand to command, RLE wiggles toes to command, b/l lower extremities withdraw to noxious,  LUE 0/5  HEENT: PERRL, +trach to vent  Neck: supple  Cardiac: RRR, S1S2  Pulmonary: chest rise symmetric  Abdomen: soft, nontender, nondistended, +PEG  Ext: perfusing well  Skin: warm, dry    TUBES/LINES:  [] Gabriel  [] A-line  [] Lumbar Drain  [] Wound Drains  [] NGT   [] EVD   [] CVC  [] Other      DIET:  [] NPO  [x] Mechanical  [] Tube feeds    LABS:                        10.7   9.03  )-----------( 237      ( 05 Feb 2025 06:28 )             33.2     02-05    137  |  101  |  46[H]  ----------------------------<  107[H]  4.0   |  24  |  1.21    Ca    9.1      05 Feb 2025 06:28  Phos  4.1     02-05  Mg     2.1     02-05        Urinalysis Basic - ( 05 Feb 2025 06:28 )    Color: x / Appearance: x / SG: x / pH: x  Gluc: 107 mg/dL / Ketone: x  / Bili: x / Urobili: x   Blood: x / Protein: x / Nitrite: x   Leuk Esterase: x / RBC: x / WBC x   Sq Epi: x / Non Sq Epi: x / Bacteria: x          CAPILLARY BLOOD GLUCOSE          Drug Levels: [] N/A    CSF Analysis: [] N/A      Allergies    Allergy Status Unknown    Intolerances        Home Medications:      MEDICATIONS:  MEDICATIONS  (STANDING):  acetylcysteine 10%  Inhalation 4 milliLiter(s) Inhalation every 6 hours  albuterol/ipratropium for Nebulization 3 milliLiter(s) Nebulizer every 6 hours  amLODIPine   Tablet 5 milliGRAM(s) Oral daily  artificial tears (preservative free) Ophthalmic Solution 1 Drop(s) Right EYE every 4 hours  baclofen 5 milliGRAM(s) Oral every 12 hours  chlorhexidine 0.12% Liquid 15 milliLiter(s) Oral Mucosa every 12 hours  chlorhexidine 2% Cloths 1 Application(s) Topical <User Schedule>  doxazosin 8 milliGRAM(s) Oral at bedtime  erythromycin   Ointment 1 Application(s) Right EYE at bedtime  fluticasone propionate 50 MICROgram(s)/spray Nasal Spray 1 Spray(s) Both Nostrils two times a day  heparin   Injectable 5000 Unit(s) SubCutaneous every 8 hours  influenza   Vaccine 0.5 milliLiter(s) IntraMuscular once  levETIRAcetam 1500 milliGRAM(s) Oral every 12 hours  ofloxacin 0.3% Solution 1 Drop(s) Right EYE four times a day  pantoprazole   Suspension 40 milliGRAM(s) Oral daily  petrolatum Ophthalmic Ointment 1 Application(s) Both EYES two times a day  phenytoin   Suspension 150 milliGRAM(s) Oral <User Schedule>  phenytoin   Suspension 200 milliGRAM(s) Oral <User Schedule>  propranolol 5 milliGRAM(s) Oral every 12 hours  sodium chloride 0.65% Nasal 1 Spray(s) Both Nostrils two times a day    MEDICATIONS  (PRN):  acetaminophen   Oral Liquid .. 650 milliGRAM(s) Oral every 6 hours PRN Temp greater or equal to 38C (100.4F), Mild Pain (1 - 3)  LORazepam   Injectable 2 milliGRAM(s) IV Push once PRN Seizure Activity (NOTIFY PROVIDER PRIOR TO GIVING)      CULTURES:  Culture Results:   No growth at 72 Hours (02-01 @ 19:45)  Culture Results:   >100,000 CFU/ml Klebsiella aerogenes (Previously Enterobacter) (01-27 @ 23:20)      RADIOLOGY & ADDITIONAL TESTS:      ASSESSMENT:  44M PMH ?stroke/MI present to [facility] after collapsing at work. Decorticate posturing, vomiting, intubated for airway protection. 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 Transferred to [facility] for further management. s/p trach 10/14. s/p peg 10/21. Re-upgrade to ICU 2/2 desaturation event and suctioning requirements 11/15. Re-upgrade to NSICU 12/15 2/2 desaturation and tachycardia.    Neuro:  - neuro/vitals q4h  - pain control: tylenol prn  - seizure tx: keppra 1500mg BID, phenytoin 150/150/200  - s/p vEEG (1/26-1/27)vEEG (10/3-4) negative, (10/17-10/19) negative, (12/17-12/18) negative, (12/22-12/25), (1/26-1/28),  +focal motor seizures not correlating w/ EEG,  - CTH 9/30: enlarged pontine hemorrhage, CTH 10/3: stable, CTH 10/25: mostly resolved pontine hemorrhage, CTH 12/15: R mastoid air cell opacification; acute otitis media vs sterile effusion, CTH 12/18: stable. CTH 1/21 stable, CTH 1/27 stable  - MRI brain 10/12: parenchymal hemorrhage, acute/subacute R cerebellar stroke      CV:  - -160  - tachycardia: propranolol 5mg BID   - HTN: amlodipine 5mg  - echo (9/30) EF 75%, repeat 12/16: 57%    Resp:  - trach to vent, AC/VC 40/400/14/6  - Secretions: duonebs/mucomyst/chest PT q6h     GI:  - TFs via PEG  - bowel regimen held for loose stool, last BM 2/4  - baclofen 5q12 for hiccups    Renal:  - FW 200cc q4h for hydration  - Urinary retenion: Gabriel removed 1/7, SC prn  - CKD: trend BUN/Cr  - renal US 10/1, 10/8, 1/15: Increased bilateral renal echogenicity consistent with medical renal disease    Endo:  - A1c 5.4    Heme:  - DVT ppx: SCDs, SQH 5000u q8h   - LE dopplers negative 12/16    ID:  - last pan cx 1/27  - +klebsiella UTI s/p levaquin (1/30-2/3)  - empiric PNA: cefepime (12/22-12/30), s/p vanc (12/22-12/23), s/p zosyn (12/15-12/20), s/p vanc (12/15-12/16), s/p zosyn (1/12 -1/18)  - s/p Stenotrophomonas maltophilia PNA: s/p Bactrim (11/18-11/19) s/p Cefepime (11/16-11/18)  - 11/3, (+) sputum for stenotrophomas maltophlia, blood cx (+) klebsiella, cefepime 2gq12 (11/6 - 11/12)   - S/p Ancef (10/4-10/14) for PNA, and s/p Unasyn (10/18-10/23) +actinobacter baumanii     MISC:  - ophtho consult for keratitis  - erythromycin ointment R eye q4hrs, ofloxacin ointment R eye QID, artificial tears R eye q2hrs, moisture chamber at bedtime    Dispo: SDU status, DNR, pending PRUCOL for benefits     D/w Dr. D'Amico     Assessment: present when checked     [] GCS   E   V   M     Heart Failure: [] Acute, [] acute on chronic, [] chronic   Heart Failure: [] Diastolic (HFpEF), [] Systolic (HRrEF), [] Combined (HFpEF and HFrEF), [] RHF, [] Pulm HTN, [] Other     [] ANIKA, [] ATN, [] AIN, [] other   [] CKD1, [] CKD2, [] CKD3, [] CKD4, [] CKD5, [] ESRD     Encephalopathy: [] Metabolic, [] Hepatic, [] Toxic, [] Neurological, [] Other     Abnormal Nutritional Status: [] malnutrition (see nutrition note), []underweight: BMI <19, [] morbid obesity: BMI >40, [] Cachexia     [] Sepsis   [] Hypovolemic shock, [] Cardiogenic shock, [] Hemorrhagic shock, [] Neurogenic shock   [] Acute respiratory failure   [] Cerebral edema, [] Brain compression / herniation   [] Functional quadriplegia   [] Acute blood loss anemia

## 2025-02-06 NOTE — PROGRESS NOTE ADULT - ASSESSMENT
44M with unclear PMH (?stroke, MI) who was found down at work, intubated for airway protection and found to have acute parenchymal hemorrhage within nabil with mass effect (+ acute/subacute right cerebellar infarct) in setting of hypertensive emergency, transferred to [facility] for further neurosurgical care. Hospital course c/b poor neurologic recovery s/p trach-PEG, AUR s/p gabriel, ANIKA on CKD c/b hyperkalemia, HAP s/p amp-sulbactam (EOT 10/23), K aerogenes bacteremia  treated with 2 weeks of Cefepime per ID , On 11/15 he became septic and was transferred to NSICU due to increased o2 requirements and needed Vent support , Trach Cultures + for Stenotrophomonas which was treated with 7 days of Bactrim per ID , has been weaned of Vent since 11/23 and is now on 10lts at 40% o2 through Trach Collar. Stepped up to the NSICU on 12/15 for hypoxia and tachycardia. PE ruled out. On abx for 5 days. Unclear etiology. Now stepped down to 8Lach.    # Pontine hemorrhage  # Encephalopathy due to Intracranial Bleed   - Acute parenchymal hemorrhage within nabil with mass effect (+ acute/subacute right cerebellar infarct) in setting of hypertensive emergency.   - MRI brain also demonstrated acute/subacute R cerebellar stroke.   - No Neurosurgical Interventions were performed   - Secondary to IPH and cerebellar stroke - Trach and PEG Dependent    # Klebsiella UTI  - remains afebrile  - Urine cx 1/27 growing Klebsiella   - MRSA nares negative  - Completed 5 day course of Levaquin    # Seizures  - Continue Seizure precautions   - Continue Keppra and phenytoin - appreciate further recs from epilepsy    # Hypertension  - amlodipine 5mg daily with holding parameters     # Chronic respiratory failure.   - s/p percutaneous trach by pulm on 10/14/24  - Currently on Vent Support   - Continue Chest PT    # Neurogenic dysphagia.   - Pt s/p PEG with surgery 10/21/24  - TF per nutrition  - aspiration precautions and elevation of HOB.    # Acute urinary retention.   - doxazosin 8mg  - monitor urine output    # Functional quadriplegia in setting of brainstem hemorrhage  - decub precautions  - care per nursing protocol     # Elevated Creatinine and BUN , not meeting Criteria for ANIKA , Suspect Pre Renal , Ensure patient receives all his Free water Flushes and Continues to Receive Tube Feeds ( Total would be 2280ml/day - Tube Feeds 1080ml , Free Water 1200 ml)     51 minutes spent on this encounter  including face to face with patient, review of chart, care coordination and documentation.  plan discussed with neurosurgery team.

## 2025-02-06 NOTE — PROGRESS NOTE ADULT - SUBJECTIVE AND OBJECTIVE BOX
OVERNIGHT EVENTS:  NAEO per nursing staff , no events of Telemetry     SUBJECTIVE : patient does not talk , he was awake and resting in bed     PHYSICAL EXAM:  General: NAD, on vent through trach, breathing is nonlabored  HEENT: trach collar in place  Lungs: clear to auscultation anteriorly  Heart: RRR, normal s1s2  Abdomen: soft, no distension, + BS, PEG tube in place, site is clean and dry   Extremities:  warm, wwp     VITAL SIGNS:  Vital Signs Last 24 Hrs  T(C): 36.3 (06 Feb 2025 08:51), Max: 36.8 (05 Feb 2025 21:21)  T(F): 97.4 (06 Feb 2025 08:51), Max: 98.3 (05 Feb 2025 21:21)  HR: 66 (06 Feb 2025 11:20) (66 - 81)  BP: 102/72 (06 Feb 2025 11:20) (92/68 - 121/83)  BP(mean): 82 (06 Feb 2025 11:20) (72 - 98)  RR: 16 (06 Feb 2025 11:20) (14 - 19)  SpO2: 99% (06 Feb 2025 11:20) (97% - 99%)    Parameters below as of 06 Feb 2025 11:20  Patient On (Oxygen Delivery Method): ventilator    O2 Concentration (%): 40    MEDICATIONS:  MEDICATIONS  (STANDING):  acetylcysteine 10%  Inhalation 4 milliLiter(s) Inhalation every 6 hours  albuterol/ipratropium for Nebulization 3 milliLiter(s) Nebulizer every 6 hours  amLODIPine   Tablet 5 milliGRAM(s) Oral daily  artificial tears (preservative free) Ophthalmic Solution 1 Drop(s) Right EYE every 4 hours  baclofen 5 milliGRAM(s) Oral every 12 hours  chlorhexidine 0.12% Liquid 15 milliLiter(s) Oral Mucosa every 12 hours  chlorhexidine 2% Cloths 1 Application(s) Topical <User Schedule>  doxazosin 8 milliGRAM(s) Oral at bedtime  erythromycin   Ointment 1 Application(s) Right EYE at bedtime  fluticasone propionate 50 MICROgram(s)/spray Nasal Spray 1 Spray(s) Both Nostrils two times a day  heparin   Injectable 5000 Unit(s) SubCutaneous every 8 hours  influenza   Vaccine 0.5 milliLiter(s) IntraMuscular once  levETIRAcetam 1500 milliGRAM(s) Oral every 12 hours  ofloxacin 0.3% Solution 1 Drop(s) Right EYE four times a day  pantoprazole   Suspension 40 milliGRAM(s) Oral daily  petrolatum Ophthalmic Ointment 1 Application(s) Both EYES two times a day  phenytoin   Suspension 150 milliGRAM(s) Oral <User Schedule>  phenytoin   Suspension 200 milliGRAM(s) Oral <User Schedule>  propranolol 5 milliGRAM(s) Oral every 12 hours  sodium chloride 0.65% Nasal 1 Spray(s) Both Nostrils two times a day        LABS:                                                         10.7   9.03  )-----------( 237      ( 05 Feb 2025 06:28 )             33.2     02-05    137  |  101  |  46[H]  ----------------------------<  107[H]  4.0   |  24  |  1.21    Ca    9.1      05 Feb 2025 06:28  Phos  4.1     02-05  Mg     2.1     02-05              CAPILLARY BLOOD GLUCOSE          RADIOLOGY & ADDITIONAL TESTS: Reviewed.

## 2025-02-07 LAB
ALBUMIN SERPL ELPH-MCNC: 3.5 G/DL — SIGNIFICANT CHANGE UP (ref 3.3–5)
ALP SERPL-CCNC: 172 U/L — HIGH (ref 40–120)
ALT FLD-CCNC: 16 U/L — SIGNIFICANT CHANGE UP (ref 10–45)
ANION GAP SERPL CALC-SCNC: 15 MMOL/L — SIGNIFICANT CHANGE UP (ref 5–17)
AST SERPL-CCNC: 12 U/L — SIGNIFICANT CHANGE UP (ref 10–40)
BASOPHILS # BLD AUTO: 0.08 K/UL — SIGNIFICANT CHANGE UP (ref 0–0.2)
BASOPHILS NFR BLD AUTO: 1.1 % — SIGNIFICANT CHANGE UP (ref 0–2)
BILIRUB SERPL-MCNC: 0.2 MG/DL — SIGNIFICANT CHANGE UP (ref 0.2–1.2)
BUN SERPL-MCNC: 52 MG/DL — HIGH (ref 7–23)
CALCIUM SERPL-MCNC: 9 MG/DL — SIGNIFICANT CHANGE UP (ref 8.4–10.5)
CHLORIDE SERPL-SCNC: 99 MMOL/L — SIGNIFICANT CHANGE UP (ref 96–108)
CO2 SERPL-SCNC: 24 MMOL/L — SIGNIFICANT CHANGE UP (ref 22–31)
CREAT SERPL-MCNC: 1.19 MG/DL — SIGNIFICANT CHANGE UP (ref 0.5–1.3)
CULTURE RESULTS: SIGNIFICANT CHANGE UP
EGFR: 76 ML/MIN/1.73M2 — SIGNIFICANT CHANGE UP
EGFR: 76 ML/MIN/1.73M2 — SIGNIFICANT CHANGE UP
EOSINOPHIL # BLD AUTO: 0.69 K/UL — HIGH (ref 0–0.5)
EOSINOPHIL NFR BLD AUTO: 9.9 % — HIGH (ref 0–6)
GLUCOSE SERPL-MCNC: 119 MG/DL — HIGH (ref 70–99)
HCT VFR BLD CALC: 31.7 % — LOW (ref 39–50)
HGB BLD-MCNC: 10.6 G/DL — LOW (ref 13–17)
IMM GRANULOCYTES NFR BLD AUTO: 1.3 % — HIGH (ref 0–0.9)
LYMPHOCYTES # BLD AUTO: 1.76 K/UL — SIGNIFICANT CHANGE UP (ref 1–3.3)
LYMPHOCYTES # BLD AUTO: 25.3 % — SIGNIFICANT CHANGE UP (ref 13–44)
MAGNESIUM SERPL-MCNC: 2.3 MG/DL — SIGNIFICANT CHANGE UP (ref 1.6–2.6)
MCHC RBC-ENTMCNC: 32.1 PG — SIGNIFICANT CHANGE UP (ref 27–34)
MCHC RBC-ENTMCNC: 33.4 G/DL — SIGNIFICANT CHANGE UP (ref 32–36)
MCV RBC AUTO: 96.1 FL — SIGNIFICANT CHANGE UP (ref 80–100)
MONOCYTES # BLD AUTO: 0.62 K/UL — SIGNIFICANT CHANGE UP (ref 0–0.9)
MONOCYTES NFR BLD AUTO: 8.9 % — SIGNIFICANT CHANGE UP (ref 2–14)
NEUTROPHILS # BLD AUTO: 3.73 K/UL — SIGNIFICANT CHANGE UP (ref 1.8–7.4)
NEUTROPHILS NFR BLD AUTO: 53.5 % — SIGNIFICANT CHANGE UP (ref 43–77)
NRBC # BLD: 0 /100 WBCS — SIGNIFICANT CHANGE UP (ref 0–0)
NRBC BLD-RTO: 0 /100 WBCS — SIGNIFICANT CHANGE UP (ref 0–0)
PHOSPHATE SERPL-MCNC: 4.3 MG/DL — SIGNIFICANT CHANGE UP (ref 2.5–4.5)
PLATELET # BLD AUTO: 232 K/UL — SIGNIFICANT CHANGE UP (ref 150–400)
POTASSIUM SERPL-MCNC: 4.4 MMOL/L — SIGNIFICANT CHANGE UP (ref 3.5–5.3)
POTASSIUM SERPL-SCNC: 4.4 MMOL/L — SIGNIFICANT CHANGE UP (ref 3.5–5.3)
PROT SERPL-MCNC: 6.9 G/DL — SIGNIFICANT CHANGE UP (ref 6–8.3)
RBC # BLD: 3.3 M/UL — LOW (ref 4.2–5.8)
RBC # FLD: 13.3 % — SIGNIFICANT CHANGE UP (ref 10.3–14.5)
SODIUM SERPL-SCNC: 138 MMOL/L — SIGNIFICANT CHANGE UP (ref 135–145)
SPECIMEN SOURCE: SIGNIFICANT CHANGE UP
WBC # BLD: 6.97 K/UL — SIGNIFICANT CHANGE UP (ref 3.8–10.5)
WBC # FLD AUTO: 6.97 K/UL — SIGNIFICANT CHANGE UP (ref 3.8–10.5)

## 2025-02-07 PROCEDURE — 99233 SBSQ HOSP IP/OBS HIGH 50: CPT

## 2025-02-07 RX ORDER — SENNA 187 MG
2 TABLET ORAL AT BEDTIME
Refills: 0 | Status: DISCONTINUED | OUTPATIENT
Start: 2025-02-07 | End: 2025-02-22

## 2025-02-07 RX ORDER — ERYTHROMYCIN 5 MG/G
1 OINTMENT OPHTHALMIC AT BEDTIME
Refills: 0 | Status: DISCONTINUED | OUTPATIENT
Start: 2025-02-07 | End: 2025-02-13

## 2025-02-07 RX ORDER — SODIUM CHLORIDE 9 G/1000ML
1000 INJECTION, SOLUTION INTRAVENOUS
Refills: 0 | Status: DISCONTINUED | OUTPATIENT
Start: 2025-02-07 | End: 2025-02-08

## 2025-02-07 RX ORDER — HYPROMELLOSE 0.4 %
1 DROPS OPHTHALMIC (EYE) AT BEDTIME
Refills: 0 | Status: DISCONTINUED | OUTPATIENT
Start: 2025-02-07 | End: 2025-02-07

## 2025-02-07 RX ADMIN — LEVETIRACETAM 1500 MILLIGRAM(S): 10 INJECTION, SOLUTION INTRAVENOUS at 05:29

## 2025-02-07 RX ADMIN — DOXAZOSIN MESYLATE 8 MILLIGRAM(S): 8 TABLET ORAL at 22:00

## 2025-02-07 RX ADMIN — OFLOXACIN 1 DROP(S): 3 SOLUTION OPHTHALMIC at 05:28

## 2025-02-07 RX ADMIN — Medication 650 MILLIGRAM(S): at 17:20

## 2025-02-07 RX ADMIN — HEPARIN SODIUM 5000 UNIT(S): 1000 INJECTION INTRAVENOUS; SUBCUTANEOUS at 15:32

## 2025-02-07 RX ADMIN — Medication 1 SPRAY(S): at 17:26

## 2025-02-07 RX ADMIN — BACLOFEN 5 MILLIGRAM(S): 10 INJECTION INTRATHECAL at 05:28

## 2025-02-07 RX ADMIN — IPRATROPIUM BROMIDE AND ALBUTEROL SULFATE 3 MILLILITER(S): .5; 2.5 SOLUTION RESPIRATORY (INHALATION) at 05:27

## 2025-02-07 RX ADMIN — Medication 2 TABLET(S): at 22:00

## 2025-02-07 RX ADMIN — OFLOXACIN 1 DROP(S): 3 SOLUTION OPHTHALMIC at 17:25

## 2025-02-07 RX ADMIN — OFLOXACIN 1 DROP(S): 3 SOLUTION OPHTHALMIC at 12:09

## 2025-02-07 RX ADMIN — HEPARIN SODIUM 5000 UNIT(S): 1000 INJECTION INTRAVENOUS; SUBCUTANEOUS at 22:00

## 2025-02-07 RX ADMIN — Medication 200 MILLIGRAM(S): at 19:30

## 2025-02-07 RX ADMIN — FLUTICASONE PROPIONATE 1 SPRAY(S): 50 SPRAY, METERED NASAL at 05:30

## 2025-02-07 RX ADMIN — AMLODIPINE BESYLATE 5 MILLIGRAM(S): 10 TABLET ORAL at 05:28

## 2025-02-07 RX ADMIN — BACLOFEN 5 MILLIGRAM(S): 10 INJECTION INTRATHECAL at 17:22

## 2025-02-07 RX ADMIN — Medication 1 DROP(S): at 15:32

## 2025-02-07 RX ADMIN — Medication 40 MILLIGRAM(S): at 12:08

## 2025-02-07 RX ADMIN — Medication 150 MILLIGRAM(S): at 04:54

## 2025-02-07 RX ADMIN — LEVETIRACETAM 1500 MILLIGRAM(S): 10 INJECTION, SOLUTION INTRAVENOUS at 17:21

## 2025-02-07 RX ADMIN — Medication 150 MILLIGRAM(S): at 12:20

## 2025-02-07 RX ADMIN — IPRATROPIUM BROMIDE AND ALBUTEROL SULFATE 3 MILLILITER(S): .5; 2.5 SOLUTION RESPIRATORY (INHALATION) at 17:13

## 2025-02-07 RX ADMIN — Medication 15 MILLILITER(S): at 17:22

## 2025-02-07 RX ADMIN — SODIUM CHLORIDE 100 MILLILITER(S): 9 INJECTION, SOLUTION INTRAVENOUS at 12:10

## 2025-02-07 RX ADMIN — Medication 1 APPLICATION(S): at 06:13

## 2025-02-07 RX ADMIN — HEPARIN SODIUM 5000 UNIT(S): 1000 INJECTION INTRAVENOUS; SUBCUTANEOUS at 05:27

## 2025-02-07 RX ADMIN — FLUTICASONE PROPIONATE 1 SPRAY(S): 50 SPRAY, METERED NASAL at 17:26

## 2025-02-07 RX ADMIN — IPRATROPIUM BROMIDE AND ALBUTEROL SULFATE 3 MILLILITER(S): .5; 2.5 SOLUTION RESPIRATORY (INHALATION) at 12:08

## 2025-02-07 RX ADMIN — Medication 1 DROP(S): at 12:09

## 2025-02-07 RX ADMIN — Medication 1 APPLICATION(S): at 17:57

## 2025-02-07 RX ADMIN — Medication 1 DROP(S): at 05:31

## 2025-02-07 RX ADMIN — Medication 1 SPRAY(S): at 05:30

## 2025-02-07 RX ADMIN — ACETYLCYSTEINE 4 MILLILITER(S): 200 INHALANT RESPIRATORY (INHALATION) at 05:28

## 2025-02-07 RX ADMIN — ACETYLCYSTEINE 4 MILLILITER(S): 200 INHALANT RESPIRATORY (INHALATION) at 17:14

## 2025-02-07 RX ADMIN — ACETYLCYSTEINE 4 MILLILITER(S): 200 INHALANT RESPIRATORY (INHALATION) at 12:08

## 2025-02-07 RX ADMIN — Medication 1 DROP(S): at 22:01

## 2025-02-07 RX ADMIN — Medication 1 DROP(S): at 17:25

## 2025-02-07 RX ADMIN — Medication 1 APPLICATION(S): at 05:30

## 2025-02-07 RX ADMIN — ERYTHROMYCIN 1 APPLICATION(S): 5 OINTMENT OPHTHALMIC at 22:01

## 2025-02-07 RX ADMIN — Medication 650 MILLIGRAM(S): at 18:00

## 2025-02-07 RX ADMIN — Medication 15 MILLILITER(S): at 05:29

## 2025-02-07 NOTE — PROGRESS NOTE ADULT - ASSESSMENT
44M with unclear PMH (?stroke, MI) who was found down at work, intubated for airway protection and found to have acute parenchymal hemorrhage within nabil with mass effect (+ acute/subacute right cerebellar infarct) in setting of hypertensive emergency, transferred to [facility] for further neurosurgical care. Hospital course c/b poor neurologic recovery s/p trach-PEG, AUR s/p gabriel, ANIKA on CKD c/b hyperkalemia, HAP s/p amp-sulbactam (EOT 10/23), K aerogenes bacteremia  treated with 2 weeks of Cefepime per ID , On 11/15 he became septic and was transferred to NSICU due to increased o2 requirements and needed Vent support , Trach Cultures + for Stenotrophomonas which was treated with 7 days of Bactrim per ID , has been weaned of Vent since 11/23 and is now on 10lts at 40% o2 through Trach Collar. Stepped up to the NSICU on 12/15 for hypoxia and tachycardia. PE ruled out. On abx for 5 days. Unclear etiology. Now stepped down to 8Lach.    # Pontine hemorrhage  # Encephalopathy due to Intracranial Bleed   - Acute parenchymal hemorrhage within nabil with mass effect (+ acute/subacute right cerebellar infarct) in setting of hypertensive emergency.   - MRI brain also demonstrated acute/subacute R cerebellar stroke.   - No Neurosurgical Interventions were performed   - Secondary to IPH and cerebellar stroke - Trach and PEG Dependent    # Klebsiella UTI  - remains afebrile  - Urine cx 1/27 growing Klebsiella   - MRSA nares negative  - Completed 5 day course of Levaquin    # Seizures  - Continue Seizure precautions   - Continue Keppra and phenytoin - appreciate further recs from epilepsy    # Hypertension  - amlodipine 5mg daily with holding parameters     # Chronic respiratory failure.   - s/p percutaneous trach by pulm on 10/14/24  - Currently on Vent Support   - Continue Chest PT    # Neurogenic dysphagia.   - Pt s/p PEG with surgery 10/21/24  - TF per nutrition  - aspiration precautions and elevation of HOB.    # Acute urinary retention.   - doxazosin 8mg  - monitor urine output    # Functional quadriplegia in setting of brainstem hemorrhage  - decub precautions  - care per nursing protocol     # CKD stage 3 , Baseline Creatinine 1.1   - IV LR 100ml/hr for 10 hours   - Ensure patient receives all his Free water Flushes and Continues to Receive Tube Feeds ( Total would be 2280ml/day - Tube Feeds 1080ml , Free Water 1200 ml)     51 minutes spent on this encounter  including face to face with patient, review of chart, care coordination and documentation.  plan discussed with neurosurgery team.

## 2025-02-07 NOTE — PROGRESS NOTE ADULT - SUBJECTIVE AND OBJECTIVE BOX
OVERNIGHT EVENTS:  NAEO per nursing staff , no events of Telemetry     SUBJECTIVE : patient does not talk , he was awake and resting in bed     PHYSICAL EXAM:  General: NAD, on vent through trach, breathing is nonlabored  HEENT: trach collar in place  Lungs: clear to auscultation anteriorly  Heart: RRR, normal s1s2  Abdomen: soft, no distension, + BS, PEG tube in place, site is clean and dry   Extremities:  warm, wwp     VITAL SIGNS:  Vital Signs Last 24 Hrs  T(C): 36.7 (07 Feb 2025 09:03), Max: 36.8 (06 Feb 2025 21:49)  T(F): 98 (07 Feb 2025 09:03), Max: 98.3 (06 Feb 2025 21:49)  HR: 70 (07 Feb 2025 12:36) (68 - 106)  BP: 99/69 (07 Feb 2025 11:30) (99/69 - 116/77)  BP(mean): 80 (07 Feb 2025 11:30) (77 - 92)  RR: 18 (07 Feb 2025 11:30) (14 - 18)  SpO2: 98% (07 Feb 2025 12:36) (92% - 99%)    Parameters below as of 07 Feb 2025 11:30  Patient On (Oxygen Delivery Method): ventilator    O2 Concentration (%): 40    O2 Concentration (%): 40    MEDICATIONS:  MEDICATIONS  (STANDING):  acetylcysteine 10%  Inhalation 4 milliLiter(s) Inhalation every 6 hours  albuterol/ipratropium for Nebulization 3 milliLiter(s) Nebulizer every 6 hours  amLODIPine   Tablet 5 milliGRAM(s) Oral daily  artificial tears (preservative free) Ophthalmic Solution 1 Drop(s) Right EYE every 4 hours  baclofen 5 milliGRAM(s) Oral every 12 hours  chlorhexidine 0.12% Liquid 15 milliLiter(s) Oral Mucosa every 12 hours  chlorhexidine 2% Cloths 1 Application(s) Topical <User Schedule>  doxazosin 8 milliGRAM(s) Oral at bedtime  erythromycin   Ointment 1 Application(s) Right EYE at bedtime  fluticasone propionate 50 MICROgram(s)/spray Nasal Spray 1 Spray(s) Both Nostrils two times a day  heparin   Injectable 5000 Unit(s) SubCutaneous every 8 hours  influenza   Vaccine 0.5 milliLiter(s) IntraMuscular once  levETIRAcetam 1500 milliGRAM(s) Oral every 12 hours  ofloxacin 0.3% Solution 1 Drop(s) Right EYE four times a day  pantoprazole   Suspension 40 milliGRAM(s) Oral daily  petrolatum Ophthalmic Ointment 1 Application(s) Both EYES two times a day  phenytoin   Suspension 150 milliGRAM(s) Oral <User Schedule>  phenytoin   Suspension 200 milliGRAM(s) Oral <User Schedule>  propranolol 5 milliGRAM(s) Oral every 12 hours  sodium chloride 0.65% Nasal 1 Spray(s) Both Nostrils two times a day        LABS:                                                         10.6   6.97  )-----------( 232      ( 07 Feb 2025 07:14 )             31.7     02-07    138  |  99  |  52[H]  ----------------------------<  119[H]  4.4   |  24  |  1.19    Ca    9.0      07 Feb 2025 07:14  Phos  4.3     02-07  Mg     2.3     02-07    TPro  6.9  /  Alb  3.5  /  TBili  0.2  /  DBili  x   /  AST  12  /  ALT  16  /  AlkPhos  172[H]  02-07              CAPILLARY BLOOD GLUCOSE          RADIOLOGY & ADDITIONAL TESTS: Reviewed.

## 2025-02-07 NOTE — PROGRESS NOTE ADULT - SUBJECTIVE AND OBJECTIVE BOX
HPI:  Unknown age male, unknown past medical history (reported stroke and MI by coworkers) presented to [facility] with AMS, Pt was working at [employer] when he was found down by coworkers. EMS called and pt brought to [facility] ED. Intubated, sedated, started on cardene for SBPs in 200s. CT head showed brain stem bleed. Transferred to NSICU for further management.  (30 Sep 2024 12:55)    INTERVAL EVENTS: GEORGE ovn    Hospital course:   9/30: transferred from [facility]. A line placed. Versed dc'd. Cy Rader at bedside, states pt has family in [place], cannot confirm medications or PMH other than stroke and MI. 250cc bolus 3% given. LR switched to NS. hydralazine 25q8 started, 3% started, switched propofol to precedex   10/1: stability CTH done. Added labetalol, started TF. Palliative consulted. ethics consulted to determine surrogate. febrile 103, pan cx sent  10/2: BD 2, GEORGE overnight. TF resumed. Desatt'd to 80s, FiO2 inc. to 50. Fentanyl given, ABG, CXR ordered. Maxxed on precedex, started on propofol for DARIEN -4 - -5. Precedex dc'd. Duonebs, mucomyst, hypertonic added. 3% dc'd. Cardene dc'd. Start vanc/CTX. Increased labetalol 200q8. MRSA negative, dc'd vanc. ETT pulled back 2cm x 2, good positioning after confirmatory chest xray. Ethics attempting to establish HCP with family. Na 159, starting FW 250q6 for range 150-155.   10/3: BD3, GEORGE o/n, neuro stable. Na elevating, FW increased to 300q6. Dc'd bowel reg for diarrhea. vEEG started. SQH 5000q8 tonight.   10/4: BD 4, albumn bolus, incr. LR to 80 2/2 incr. in Cr, LR to 10 0cc/hr for uptrending Cr. Started 7% hypersal for 48hrs and SL atropine for inline/oral thick secretions. Dc'd CTX and started ancef for MSSA in the sputum. Nephrology consulted for CKD, f/u recs. SBP 170s, given hydralazine 10mg IVP.   10/5: BD5, o/n 10mg IVP hydralazine given for SBP 170s and started on hydralazine 25q8 via OGT. 10mg IV push labetalol for SBP > 160s. RT placed for diarrhea.   10/6: BD6, o/n FW increased to 350q4 per nephrology recs. IV tylenol for temp 100.6, SBp 160s presumed uncomfortable.   10/7: BD7, overnight pancultured for temp 101.8F.   10/8: BD8. GEORGE. Cr bumped. decreased LR to 75cc/hr. Adding simethicone ATC. incr hydralazine 50mgTID. Incr labetalol 300mgTID. Na 145, decreased FWF to 250q6. Start precedex. FENa consistent with intrinsic kidney injury. Pend repeat renal US. Retaining up to 1.3L, bladder scans q6, straight cath PRN  10/9: BD 9. GEORGE overnight. Neuro stable. abd xray for distention w non-specific gas pattern, OGT to LIWS for morning. duonebs/mucomyst to q8 for improving secretions. Changed tube feeds to Jevity 1.5 20cc/hr, low rate due to abdominal distention, nepro dense and more difficult to digest. Tolerating CPAP, confirmed by ABG.   10/10: BD 10. GEORGE overnight. Neuro stable. (+) gabriel for urinary retention on bladder scan. inc TF to goal rate of 40cc/hr. family leaning toward pursuing trach/PEG. 1/2 amp for FS 81.   10/11: BD 11. GEORGE overnight. Neuro stable. Trach/PEG consults placed.   10/12: BD 12. GEORGE overnight. Neuro stable. MRI brain complete.   10/13: BD 13. Increase flomax. Hold SQH after PM dose for trach tm. IVL.   10/14: BD 14. GEORGE overnight, remains on AC/VC. Gabriel placed for urinary retention. Dc'd free water.  S/p trach with pulm. NGT placed and CXR confirmed in good position.   10/15: BD 15, GEORGE ovn. resumed feeds. spiked 101, pan cx sent.   10/16: BD 16. GEORGE ovn. Lokelma 5mg for K+ 5.4. Started vanc q 24/zosyn for empiric PNA coverage, IVF to 100/hr. PEG held for fever.   10/17: BD 17,  ordered serum osm and urine osm for am. Started sinemet for neurostimulation. Increased cardura to 0.8. Started FW 100q4, dc'd IVF. MRSA negative, dc'd vanc. NGT replaced d/t coiling.   10/18: BD 18, GEORGE overnight, neuro stable. Amantadine added for neurostim. zosyn changed to unasyn for acinetobacter baumannii, failed TOV and required SC  10/19: BD 19, GEORGE ovn. cardura 2mg added for retention. labetalol decreased 200q8, hydralazine decreased 25q8. Gabriel replaced.   10/20: BD20, GEORGE overnight. NGT dislodged, replaced. PEG tomorrow w/ gen surg, FW increased to 150q4 and labetalol decreased to 100q8, lokelma given for hyperkalemia.   10/21: BD 21. POD0 PEG placement with Gen surg. decr labetolol to 50q8, incr. cardura to 0.4, started lokelma and phoslo, dc gabriel POD0 PEG placement with Gen surg.  10/22: BD 22. Plan to start TF today via PEG. dc labetalol, Following ophtho recs. Increased apnea settings - found to be in cheyne-moe respiration. CPAP 5/5.  10/23: BD 23. hydralazine d/c'd, trach collar trial today. Rectal tube placed at 6am.  10/24: BD24, o/n lokelma held due to diarrhea. Free water 100q6 resumed. dc'd tamsulosin, amantadine. Incr'd cardura to 8mg qhs. Dc'd FW. Switched jevity to nepro. gabriel placed for high urine output. Started SL atropine for oral secretions. Dc'd free water.  10/25: BD25, o/n decreased suctioning requirements to > q4hrs, GEORGE. Cr improving, cont phoslo, lokelma held at this time. Gabriel placed yest, cont. Tolerating trach collar. Given 500cc plasmalyte bolus for ANIKA. Dc'd sinemet.   10/26: BD26, o/n resumed lokelma 5mg daily and resumed 100cc free water q6hrs. Change in neuro status with new right pupillary dilation with anisocoria (right pupil 6mm fixed and left pupil 3mm briskly reactive). Given 23.4% NaCl bullet, taken for emergent CTH showing mostly resolved pontine hemorrhage, continued brainstem hypodensity likely edema d/t hemorrhage, no new hemorrhage or infarct, no herniation, mild increase in size of left lateral ventricle. Vitals remaine stable. Na goal > 140.   10/27: BD27, o/n GEORGE.Neuro stable. Pend stepdown with airway bed.   10/28: BD 28. GEORGE overnight. Neuro stable. Miralax ordered. Gabriel removed, pending TOV.  10/29: BD 29. GEORGE o/n. Given 2L NS over 8 hrs for increased BUN/Cr ratio. Garbiel placed for frequent straight cath.   10/30: BD 30.   10/31: BD 31. GEORGE overnight. Na 149, increased free water to 200q6. 1L NS for uptrending BUN.   11/1: BD 32. GEORGE overnight. Given 1L NS for dehydration. Na 146, increased FW to 250q6.   11/2: BD 33 GEORGE overnight, neuro stable, given 500cc bolus for net negative status and tachycardia   11/3: BD 34, GEORGE overnight, neuro stable. Patient remains tachycardic, EKG showing sinus tachycardia, given additional 500cc NS bolus. Febrile to 101.9F, pan cultured (without UA), CXR WNL, given tylenol.   11/4: BD 35, GEORGE overnight, neuro stable. Given 1L NS for tachycardia. sputum (+) for stenotropohomas maltophilia.   11/5: BD 36 GEORGE overnight, neuro stable. Vancomycin dc'd. Chest PT BID. ID consulted, cont zosyn.  11/6: BD 37. blood cx + klebsiella dc zosyn changed to cefepime, CTAP ordered, rpt blood cx sent.    11/7: BD 38. Pending CT A/P, given 250cc bolus and starting maintenance fluids overnight. Pending CT A/P after bolus   11/8: BD 39. CT CAP negative for infection.   11/9: BD 40. GEORGE overnight.  11/10: BD 41. GEORGE overnight. desat to 85 on trach collar, O2 inc to 10L and 100%, O2 sat inc to 95. pt tachy to 110s, euvolemic. given tylenol. ABG and CXR ordered. spiked fever, pancultured, RVP negative. AM ABG w pO2 79, rpt w pO2 79. pt appears comfortable, satting 94%.   11/11: BD 42. GEORGE overnight. pt became tachy to 130s, desat to 90 on 100% FiO2 and 10L. suctioned, (+) productive cough. temp 101.4, given 1g IV tylenol and 500cc NS bolus for euvolemia. fever and HR downtrending. LE dopplers negative for dvt  11/12: BD 43, GEORGE ovn, fever and HR downtrending, satting 97% 70% FIO2  11/13: BD 44, GEORGE ovn. started standing tylenol x24 hours for tachycardia. desat to 80s, o2 increased. CXR stable, pending CTA PE protocol.   11/14: BD 45, GEORGE overnight, neuro stable. resp therapy dec FiO2 to 70%.   11/15: BD 46, GEORGE overnight, neuro stable.  Rapid called for desaturation 30s, tachycardic 140s. Patient bagged, 100% fio2, heavily suctioned. CXR/POCUS unremarkable. ABG c/w desaturation. WBC 14.71. Afebrile. O2 improved to 90s and patient upgraded to ICU. ABG paO2 30s improved to 89 on vent. IV Tylenol x 1, sputum sent. Start protonix while o-n vent.   11/16: BD 47. POCUS showed collapsable IVCF, given 1L bolus. Vanco/Cefpime added empriic for PNA, NGT feeds restarted. MRSA swab neg, Vanc DC'd.   11/17: BD 48. GEORGE overnight. 1l bolus for tachycardia. Spiked to 101, cultured. 500cc bolus for tachycardia, tachy to 148 given 25mcg fentanyl, 250cc albumin, 1.5L bolus. 5 IV lopressor with response HR to 100s. +Stenotrophomonas on sputum cx.   11/18: BD 49. GEORGE overnight. Consulted ID, cefepime switched to bactrim x7days. Started hydrochlorothiazine 12.5mg daily.  11/19: BD 50. Tachy 120s, given tylenol and 500cc NS. Tolerating 5/5, switched to TCx. Holding phos binder. D/c Bactrim. D/c gabriel, f/u TOV. Dc'd PPI.   11/20: BD 51. GEORGE ovn. 1600 satting low 90s, mildly tachy to 110s, afebrile, RR wnl. O2 improved to mid 90s while inc O2 to 100% on TCx. CPAP 5/5 placed back on.  11/21: BD 52, GEORGE ovn, tolerating CPAP 5/5. Switch to trach collar during the day if tolerating well. HCTZ held for Cr bump, straight cath frequence increased to q4  11/22: BD 53, GEORGE ovn. Resumed phoslo. Gabriel placed. Resumed HCTZ.   11/23: BD 54. Holding tylenol in setting of possible fever, will require pan cx if febrile. Cr improved today. Cont CPAP. Bowel regimen held i/s/o diarrhea. FOBT negative.  11/24: BD 55. GEORGE overnight. Neuro stable. HCTZ dc'ed, started lisinopril 5. Lokelma dc'ed for K 3.7.   11/25: BD 56, GEORGE overnight. Neuro stable. dc'd lisinopril 5mg. Gabriel dc'd. TOV. 1545 noted to be hypotensive, MAP 50, in supine position on chair, HR 60s, afebrile, O2 96%. Given 1L cc NS bolus, placed back on bed in reverse trendelenberg, improved to map of 66. Neostick at bedside. Vitals check q1h. Dc'ed amlodipine. Failed TOV, bladder scan q6, sc prn. Added back Senna.   11/26: BD 57, GEORGE overnight. Neuro stable. Dc'd phoslo.   11/27: BD 58, GEORGE overnight. Neuro stable.    11/28: BD 59. Gabriel replaced.   11/29: GEORGE.  11/30: GEORGE, neuro stable.   12/1: BD 62, GEORGE overnight  12/2: BD 63, GEORGE overnight.; Given 1L bolus of LR for uptrending BUN/Cr.  12/3: BD 64. Reinstated eye gtt/moisture chamber given increased Rt eye injection  12/4: BD 65. GEORGE overnight. Attempted to speak with ophtho regarding eyelid weight/closure but no answer, full mailbox.   12/5: BD 66, GEORGE overnight, bowel regimen increased and had BM.   12/6: BD 67, GEORGE overnight, neuro stable.  12/7: GEORGE overnight, neuro stable. /110s, given x1 hydralazine 10 mg IVP. Restarted home amlodipine 5mg.  12/8: GEORGE. OOB to chair.     12/11: GEORGE, mucomyst added for thick secretions, simethicone for abd distension, abd xray with stool burden, increased bowel regimen.   12/12: GEORGE overnight.   12/13: GEORGE overnight.   12/14: GEORGE overnight  12/15: o/n Patient became tachycardic HR 120s and 10 minutes later O2 sat dropped as low as 89%. Patient suctioned without improvement in O2 sat and tube feeds found in suction catheter. TFs held.  STAT CXR ordered. STAT labs sent. Respiratory therapist called to bedside and patient trach connected to ventilator. After connection to ventilator and further suctioning O2 sat improved to 97% but patient HR remains 120-130s. Upgraded to NSICU for further management. Vancomycin and zosyn started. CTA  chest PE protocol and CTH ordered. Blood cultures sent.given 500cc bolus, rpt ABG sent pO2 243, CTH and CTA chest done. FS while NPO, FiO2 dec 50 pending ABG. sputum cx positive for few GPC and GNR.   12/16: GEORGE overnight, restarted amlodipine, troponin 75   12/17: GEORGE overnight, neuro stable. Attempted CPAP this morning, did not tolerate and back on full vent support. Dc'd Vanc. Tachycardia to 140s, noted extremities to be twitching along with jaw twitching. Given 2g of Keppra, total 4mg ativan and placed on EEG, full set of labs and lactate negative. Resumed trickle feeds at 20cc/hr. Given 250cc albumin for tachycardia.   12/18: GEORGE overnight. Neuro stable. CTH stable. EEG negative and dc'd.   12/19: GEORGE overnight. neuro stable. SIMV most of day, AC/VC at night.   12/20: GEORGE overnight, neuro stable. remains on VC/AC  12/21: GEORGE ovn. 1L bolus for tachy to 120s-130s.   12/22: GEORGE ovn. Tachy to 120s, given tylenol and IVF. 2mg IV ativan given for L jaw twitching. Sx resolved for 3 minutes and started again. Epilepsy contacted. Given 2mg IV ativan. Continued twitching. Increased keppra to 1500mg BID, 2mg ativan given. Propranolol started for refractory tachycardia. vEEG ordered. albumin given. POCUS performed, no b lines. Started on fosphenytoin, loaded w 20mg/kg then 100mg TID. febrile, pancx, started cefepime 2g q8, vancomycin  12/23: GEORGE ovn. +focal motor seizures on eeg. ID consulted. Vancomycin dc'ed as per ID.  12/24: GEORGE ovn, neuro stable. Baclofen 5 mg q12 started for hiccups. Na 129 from 134. Urine lytes c/w SIADH. Given 250cc 3% bolus. CT chest for infection w/u - f/u read. Repeat Na 136. UA negative  12/25: GEORGE ovn.   12/26: GEORGE ovn  12/27: GEORGE overnight. Blood cx neg @ 4 days, DC cefepime per medicine (sputum colonized).   12/28: Desaturation o/n to 80's, CXR obtained, pulse ox changed and sats resolved. Na 133 from 138, 250cc 3% bolus given. Cefepime resumed until 12/30 per ID. Rpt Na 138.  12/29: GEORGE overnight. Na stable.   12/30: GEORGE overnight. Family meeting with son, pt now DNR.   12/31: GEORGE overnight.   1/1: GEORGE overnight, neuro stable.  1/2: GEORGE overnight, neuro stable. FW decreased to 100q6.   1/3: GEORGE overnight, neuro stable. fosphenytoin IV changed to pheytoin PO via PEG per epilepsy recommendations  1/4: GEORGE overnight, neuro stable. FW incr. to 100q4  1/5: GEORGE overnight, neuro stable.   1/6: GEORGE overnight, neuro stable   1/7: GEORGE overnight, neuro stable. BUN/Cr increased, increased FW to 200q6. Given 1L bolus of LR over 2 hours. Gabriel d/c'd, voiding, continue bladder scan q6.   1/8: GEORGE overnight, neuro stable. BUN/Cr improving.  1/9: GEORGE overnight, neuro stable.   1/10: GEORGE overnight, neuro stable.   1/11: GEORGE overnight, neuro stable, vent settings stable.   1/12: GEORGE overnight, neuro stable. Febrile to 102F, f/u pan cx, chest xray, given tylenol. Zosyn started.   1/13: GEORGE overnight, neuro stable, cont Zosyn for presumed PNA, prelim sputum cx growing; few GS neg diplococci, mod GS neg rods, rare GS + rods, fever trend improved. Free water increased to [id number].   1/14: GEORGE overnight. pending renal US for elevated Cr  1/15: GEORGE ovn. renal US complete.   1/16: GEORGE overnight, neuro stable   1/17: GEORGE overnight, neuro stable   1/18: GEORGE overnight, neuro stable.   1/19: GEORGE overnight, neuro stable. Propranolol dec to 5q8.   1/20: GEORGE overnight, neuro stable. Weaned propranolol to 5mg BID.   1/21: GEORGE ovn, in AM having rapid vertical eye movements with facial twitching, 2g total keppra given for AM dose and phenytoin level ordered, vital signs stable. CTH stable. Phenytoin increased to 100/100/150mg per epilepsy  1/22: GEORGE ovn. IV hydral x 1 for SBP 170s.   1/23: Cont to have focal motor seizures. Per epilepsy, changed phenytoin dose to 100/100/200.   1/24: Phenytoin lvl tmrw AM. Chest Xray completed due to temp 100.2F  1/25: GEORGE overnight. Incr dilantin morning and afternoon per epilepsy.   1/26: GEORGE overnight. Sustained focal twitching noticed by nursing. Ativan 8mg in total given. Fosphenytoin 1500mg IV given. Placed on EEG. Epilepsy following. TFs held. Phenytoin increased to 150/150/200.   1/27: o/n CTH completed due to continued lethargy after ativan given yesterday afternoon. TFs resumed. 500cc bolus NS given for SBP 90s. EEG dc'ed, discussed w/ Dr. Tomlin. Febrile 101F, pan cx sent. Likely left sided infiltrate on CXR, c/f aspiration PNA. Started on vanc/zosyn. MRSA swab pending.   1/28: Neuro stable, on vanc/zosyn. +UTI on UA, MRSA negative. Vanc dc'd. RVP negative. Zosyn increased to pseudomonal dosing.   1/29: GEORGE overnight, neuro stable.  1/30: GEORGE overnight, neuro stable. Dc'd zosyn due to resistance, switched to Levaquin.  1/31: GEORGE ovenight, neuro stable.   2/1: Brief desat. Improved with neb and reposititioning. ABG and POCUS wnl.   2/4: GEORGE overnight  2/5: GEORGE overnight  2/6: GEORGE ovn  2/7: GEORGE ovn    Vital Signs Last 24 Hrs  T(C): 36.8 (06 Feb 2025 21:49), Max: 36.8 (06 Feb 2025 21:49)  T(F): 98.3 (06 Feb 2025 21:49), Max: 98.3 (06 Feb 2025 21:49)  HR: 76 (06 Feb 2025 23:50) (66 - 80)  BP: 103/63 (06 Feb 2025 23:50) (92/68 - 116/77)  BP(mean): 77 (06 Feb 2025 23:50) (72 - 93)  RR: 14 (06 Feb 2025 23:50) (14 - 19)  SpO2: 96% (06 Feb 2025 23:50) (96% - 99%)    Parameters below as of 06 Feb 2025 23:50  Patient On (Oxygen Delivery Method): ventilator    O2 Concentration (%): 40    I&O's Summary    05 Feb 2025 07:01  -  06 Feb 2025 07:00  --------------------------------------------------------  IN: 2080 mL / OUT: 950 mL / NET: 1130 mL    06 Feb 2025 07:01  -  07 Feb 2025 01:16  --------------------------------------------------------  IN: 910 mL / OUT: 1100 mL / NET: -190 mL        PHYSICAL EXAM:  General: patient seen laying supine in bed in NAD, Azeri speaking  Neuro: opens eyes spontaneously, A+Ox3 to person, place and time to R hand squeeze, tracks vertically, RUE squeezes hand to command, RLE wiggles toes to command, b/l lower extremities withdraw to noxious,  LUE 0/5  HEENT: PERRL, +trach to vent  Neck: supple  Cardiac: RRR, S1S2  Pulmonary: chest rise symmetric  Abdomen: soft, nontender, nondistended, +PEG  Ext: perfusing well  Skin: warm, dry    TUBES/LINES:  [] Gabriel  [] A-line  [] Lumbar Drain  [] Wound Drains  [] NGT   [] EVD   [] CVC  [] Other      DIET:  [] NPO  [x] Mechanical  [] Tube feeds    LABS:                        10.7   9.03  )-----------( 237      ( 05 Feb 2025 06:28 )             33.2     02-05    137  |  101  |  46[H]  ----------------------------<  107[H]  4.0   |  24  |  1.21    Ca    9.1      05 Feb 2025 06:28  Phos  4.1     02-05  Mg     2.1     02-05        Urinalysis Basic - ( 05 Feb 2025 06:28 )    Color: x / Appearance: x / SG: x / pH: x  Gluc: 107 mg/dL / Ketone: x  / Bili: x / Urobili: x   Blood: x / Protein: x / Nitrite: x   Leuk Esterase: x / RBC: x / WBC x   Sq Epi: x / Non Sq Epi: x / Bacteria: x          CAPILLARY BLOOD GLUCOSE          Drug Levels: [] N/A    CSF Analysis: [] N/A      Allergies    Allergy Status Unknown    Intolerances        Home Medications:      MEDICATIONS:  MEDICATIONS  (STANDING):  acetylcysteine 10%  Inhalation 4 milliLiter(s) Inhalation every 6 hours  albuterol/ipratropium for Nebulization 3 milliLiter(s) Nebulizer every 6 hours  amLODIPine   Tablet 5 milliGRAM(s) Oral daily  artificial tears (preservative free) Ophthalmic Solution 1 Drop(s) Right EYE every 4 hours  baclofen 5 milliGRAM(s) Oral every 12 hours  chlorhexidine 0.12% Liquid 15 milliLiter(s) Oral Mucosa every 12 hours  chlorhexidine 2% Cloths 1 Application(s) Topical <User Schedule>  doxazosin 8 milliGRAM(s) Oral at bedtime  erythromycin   Ointment 1 Application(s) Right EYE at bedtime  fluticasone propionate 50 MICROgram(s)/spray Nasal Spray 1 Spray(s) Both Nostrils two times a day  heparin   Injectable 5000 Unit(s) SubCutaneous every 8 hours  influenza   Vaccine 0.5 milliLiter(s) IntraMuscular once  levETIRAcetam 1500 milliGRAM(s) Oral every 12 hours  ofloxacin 0.3% Solution 1 Drop(s) Right EYE four times a day  pantoprazole   Suspension 40 milliGRAM(s) Oral daily  petrolatum Ophthalmic Ointment 1 Application(s) Both EYES two times a day  phenytoin   Suspension 150 milliGRAM(s) Oral <User Schedule>  phenytoin   Suspension 200 milliGRAM(s) Oral <User Schedule>  propranolol 5 milliGRAM(s) Oral every 12 hours  sodium chloride 0.65% Nasal 1 Spray(s) Both Nostrils two times a day    MEDICATIONS  (PRN):  acetaminophen   Oral Liquid .. 650 milliGRAM(s) Oral every 6 hours PRN Temp greater or equal to 38C (100.4F), Mild Pain (1 - 3)  LORazepam   Injectable 2 milliGRAM(s) IV Push once PRN Seizure Activity (NOTIFY PROVIDER PRIOR TO GIVING)      CULTURES:  Culture Results:   No growth at 5 days (02-01 @ 19:45)  Culture Results:   >100,000 CFU/ml Klebsiella aerogenes (Previously Enterobacter) (01-27 @ 23:20)      RADIOLOGY & ADDITIONAL TESTS:      ASSESSMENT:  44M PMH ?stroke/MI present to [facility] after collapsing at work. Decorticate posturing, vomiting, intubated for airway protection. 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 Transferred to [facility] for further management. s/p trach 10/14. s/p peg 10/21. Re-upgrade to ICU 2/2 desaturation event and suctioning requirements 11/15. Re-upgrade to NSICU 12/15 2/2 desaturation and tachycardia.    Neuro:  - neuro/vitals q4h  - pain control: tylenol prn  - seizure tx: keppra 1500mg BID, phenytoin 150/150/200  - s/p vEEG (1/26-1/27)vEEG (10/3-4) negative, (10/17-10/19) negative, (12/17-12/18) negative, (12/22-12/25), (1/26-1/28),  +focal motor seizures not correlating w/ EEG,  - CTH 9/30: enlarged pontine hemorrhage, CTH 10/3: stable, CTH 10/25: mostly resolved pontine hemorrhage, CTH 12/15: R mastoid air cell opacification; acute otitis media vs sterile effusion, CTH 12/18: stable. CTH 1/21 stable, CTH 1/27 stable  - MRI brain 10/12: parenchymal hemorrhage, acute/subacute R cerebellar stroke      CV:  - -160  - tachycardia: propranolol 5mg BID   - HTN: amlodipine 5mg  - echo (9/30) EF 75%, repeat 12/16: 57%    Resp:  - trach to vent, AC/VC 40/400/14/6  - Secretions: duonebs/mucomyst/chest PT q6h     GI:  - TFs via PEG  - bowel regimen held for loose stool, last BM 2/4  - baclofen 5q12 for hiccups    Renal:  - FW 200cc q4h for hydration  - Urinary retenion: Gabriel removed 1/7, SC prn  - CKD: trend BUN/Cr  - renal US 10/1, 10/8, 1/15: Increased bilateral renal echogenicity consistent with medical renal disease    Endo:  - A1c 5.4    Heme:  - DVT ppx: SCDs, SQH 5000u q8h   - LE dopplers negative 12/16    ID:  - last pan cx 1/27  - +klebsiella UTI s/p levaquin (1/30-2/3)  - empiric PNA: cefepime (12/22-12/30), s/p vanc (12/22-12/23), s/p zosyn (12/15-12/20), s/p vanc (12/15-12/16), s/p zosyn (1/12 -1/18)  - s/p Stenotrophomonas maltophilia PNA: s/p Bactrim (11/18-11/19) s/p Cefepime (11/16-11/18)  - 11/3, (+) sputum for stenotrophomas maltophlia, blood cx (+) klebsiella, cefepime 2gq12 (11/6 - 11/12)   - S/p Ancef (10/4-10/14) for PNA, and s/p Unasyn (10/18-10/23) +actinobacter baumanii     MISC:  - ophtho consult for keratitis  - erythromycin ointment R eye q4hrs, ofloxacin ointment R eye QID, artificial tears R eye q2hrs, moisture chamber at bedtime    Dispo: SDU status, DNR, pending PRUCOL for benefits     D/w Dr. D'Amico     Assessment: present when checked     [] GCS   E   V   M     Heart Failure: [] Acute, [] acute on chronic, [] chronic   Heart Failure: [] Diastolic (HFpEF), [] Systolic (HRrEF), [] Combined (HFpEF and HFrEF), [] RHF, [] Pulm HTN, [] Other     [] ANIKA, [] ATN, [] AIN, [] other   [] CKD1, [] CKD2, [] CKD3, [] CKD4, [] CKD5, [] ESRD     Encephalopathy: [] Metabolic, [] Hepatic, [] Toxic, [] Neurological, [] Other     Abnormal Nutritional Status: [] malnutrition (see nutrition note), []underweight: BMI <19, [] morbid obesity: BMI >40, [] Cachexia     [] Sepsis   [] Hypovolemic shock, [] Cardiogenic shock, [] Hemorrhagic shock, [] Neurogenic shock   [] Acute respiratory failure   [] Cerebral edema, [] Brain compression / herniation   [] Functional quadriplegia   [] Acute blood loss anemia

## 2025-02-08 PROCEDURE — 99232 SBSQ HOSP IP/OBS MODERATE 35: CPT

## 2025-02-08 PROCEDURE — 99231 SBSQ HOSP IP/OBS SF/LOW 25: CPT

## 2025-02-08 RX ORDER — POLYETHYLENE GLYCOL 3350 17 G/17G
17 POWDER, FOR SOLUTION ORAL DAILY
Refills: 0 | Status: DISCONTINUED | OUTPATIENT
Start: 2025-02-08 | End: 2025-02-20

## 2025-02-08 RX ORDER — POLYETHYLENE GLYCOL 3350 17 G/17G
17 POWDER, FOR SOLUTION ORAL DAILY
Refills: 0 | Status: DISCONTINUED | OUTPATIENT
Start: 2025-02-08 | End: 2025-02-08

## 2025-02-08 RX ADMIN — IPRATROPIUM BROMIDE AND ALBUTEROL SULFATE 3 MILLILITER(S): .5; 2.5 SOLUTION RESPIRATORY (INHALATION) at 17:32

## 2025-02-08 RX ADMIN — IPRATROPIUM BROMIDE AND ALBUTEROL SULFATE 3 MILLILITER(S): .5; 2.5 SOLUTION RESPIRATORY (INHALATION) at 23:23

## 2025-02-08 RX ADMIN — Medication 1 SPRAY(S): at 17:44

## 2025-02-08 RX ADMIN — ACETYLCYSTEINE 4 MILLILITER(S): 200 INHALANT RESPIRATORY (INHALATION) at 17:32

## 2025-02-08 RX ADMIN — FLUTICASONE PROPIONATE 1 SPRAY(S): 50 SPRAY, METERED NASAL at 05:04

## 2025-02-08 RX ADMIN — BACLOFEN 5 MILLIGRAM(S): 10 INJECTION INTRATHECAL at 17:33

## 2025-02-08 RX ADMIN — Medication 1 DROP(S): at 10:52

## 2025-02-08 RX ADMIN — Medication 150 MILLIGRAM(S): at 04:55

## 2025-02-08 RX ADMIN — Medication 150 MILLIGRAM(S): at 12:27

## 2025-02-08 RX ADMIN — Medication 1 DROP(S): at 05:05

## 2025-02-08 RX ADMIN — Medication 2 TABLET(S): at 21:33

## 2025-02-08 RX ADMIN — ACETYLCYSTEINE 4 MILLILITER(S): 200 INHALANT RESPIRATORY (INHALATION) at 05:03

## 2025-02-08 RX ADMIN — Medication 1 APPLICATION(S): at 06:02

## 2025-02-08 RX ADMIN — IPRATROPIUM BROMIDE AND ALBUTEROL SULFATE 3 MILLILITER(S): .5; 2.5 SOLUTION RESPIRATORY (INHALATION) at 00:13

## 2025-02-08 RX ADMIN — Medication 200 MILLIGRAM(S): at 19:05

## 2025-02-08 RX ADMIN — Medication 1 APPLICATION(S): at 18:50

## 2025-02-08 RX ADMIN — AMLODIPINE BESYLATE 5 MILLIGRAM(S): 10 TABLET ORAL at 05:03

## 2025-02-08 RX ADMIN — ACETYLCYSTEINE 4 MILLILITER(S): 200 INHALANT RESPIRATORY (INHALATION) at 23:23

## 2025-02-08 RX ADMIN — BACLOFEN 5 MILLIGRAM(S): 10 INJECTION INTRATHECAL at 05:03

## 2025-02-08 RX ADMIN — IPRATROPIUM BROMIDE AND ALBUTEROL SULFATE 3 MILLILITER(S): .5; 2.5 SOLUTION RESPIRATORY (INHALATION) at 05:04

## 2025-02-08 RX ADMIN — HEPARIN SODIUM 5000 UNIT(S): 1000 INJECTION INTRAVENOUS; SUBCUTANEOUS at 14:21

## 2025-02-08 RX ADMIN — ACETYLCYSTEINE 4 MILLILITER(S): 200 INHALANT RESPIRATORY (INHALATION) at 00:13

## 2025-02-08 RX ADMIN — OFLOXACIN 1 DROP(S): 3 SOLUTION OPHTHALMIC at 05:04

## 2025-02-08 RX ADMIN — HEPARIN SODIUM 5000 UNIT(S): 1000 INJECTION INTRAVENOUS; SUBCUTANEOUS at 05:04

## 2025-02-08 RX ADMIN — OFLOXACIN 1 DROP(S): 3 SOLUTION OPHTHALMIC at 17:43

## 2025-02-08 RX ADMIN — FLUTICASONE PROPIONATE 1 SPRAY(S): 50 SPRAY, METERED NASAL at 17:44

## 2025-02-08 RX ADMIN — ACETYLCYSTEINE 4 MILLILITER(S): 200 INHALANT RESPIRATORY (INHALATION) at 11:10

## 2025-02-08 RX ADMIN — Medication 40 MILLIGRAM(S): at 11:10

## 2025-02-08 RX ADMIN — Medication 15 MILLILITER(S): at 17:33

## 2025-02-08 RX ADMIN — ERYTHROMYCIN 1 APPLICATION(S): 5 OINTMENT OPHTHALMIC at 21:33

## 2025-02-08 RX ADMIN — DOXAZOSIN MESYLATE 8 MILLIGRAM(S): 8 TABLET ORAL at 21:33

## 2025-02-08 RX ADMIN — Medication 1 DROP(S): at 17:34

## 2025-02-08 RX ADMIN — OFLOXACIN 1 DROP(S): 3 SOLUTION OPHTHALMIC at 11:10

## 2025-02-08 RX ADMIN — OFLOXACIN 1 DROP(S): 3 SOLUTION OPHTHALMIC at 23:23

## 2025-02-08 RX ADMIN — Medication 1 DROP(S): at 21:34

## 2025-02-08 RX ADMIN — IPRATROPIUM BROMIDE AND ALBUTEROL SULFATE 3 MILLILITER(S): .5; 2.5 SOLUTION RESPIRATORY (INHALATION) at 11:04

## 2025-02-08 RX ADMIN — Medication 1 APPLICATION(S): at 05:05

## 2025-02-08 RX ADMIN — LEVETIRACETAM 1500 MILLIGRAM(S): 10 INJECTION, SOLUTION INTRAVENOUS at 17:31

## 2025-02-08 RX ADMIN — Medication 15 MILLILITER(S): at 05:03

## 2025-02-08 RX ADMIN — Medication 1 SPRAY(S): at 05:05

## 2025-02-08 RX ADMIN — HEPARIN SODIUM 5000 UNIT(S): 1000 INJECTION INTRAVENOUS; SUBCUTANEOUS at 21:34

## 2025-02-08 RX ADMIN — LEVETIRACETAM 1500 MILLIGRAM(S): 10 INJECTION, SOLUTION INTRAVENOUS at 05:04

## 2025-02-08 RX ADMIN — Medication 1 DROP(S): at 14:21

## 2025-02-08 NOTE — PROGRESS NOTE ADULT - SUBJECTIVE AND OBJECTIVE BOX
HPI:  Unknown age male, unknown past medical history (reported stroke and MI by coworkers) presented to [facility] with AMS, Pt was working at [employer] when he was found down by coworkers. EMS called and pt brought to [facility] ED. Intubated, sedated, started on cardene for SBPs in 200s. CT head showed brain stem bleed. Transferred to NSICU for further management.  (30 Sep 2024 12:55)    HOSPITAL COURSE:  9/30: transferred from [facility]. A line placed. Versed dc'd. Cy Rader at bedside, states pt has family in [place], cannot confirm medications or PMH other than stroke and MI. 250cc bolus 3% given. LR switched to NS. hydralazine 25q8 started, 3% started, switched propofol to precedex   10/1: stability CTH done. Added labetalol, started TF. Palliative consulted. ethics consulted to determine surrogate. febrile 103, pan cx sent  10/2: BD 2, GEORGE overnight. TF resumed. Desatt'd to 80s, FiO2 inc. to 50. Fentanyl given, ABG, CXR ordered. Maxxed on precedex, started on propofol for DARIEN -4 - -5. Precedex dc'd. Duonebs, mucomyst, hypertonic added. 3% dc'd. Cardene dc'd. Start vanc/CTX. Increased labetalol 200q8. MRSA negative, dc'd vanc. ETT pulled back 2cm x 2, good positioning after confirmatory chest xray. Ethics attempting to establish HCP with family. Na 159, starting FW 250q6 for range 150-155.   10/3: BD3, GEORGE o/n, neuro stable. Na elevating, FW increased to 300q6. Dc'd bowel reg for diarrhea. vEEG started. SQH 5000q8 tonight.   10/4: BD 4, albumn bolus, incr. LR to 80 2/2 incr. in Cr, LR to 10 0cc/hr for uptrending Cr. Started 7% hypersal for 48hrs and SL atropine for inline/oral thick secretions. Dc'd CTX and started ancef for MSSA in the sputum. Nephrology consulted for CKD, f/u recs. SBP 170s, given hydralazine 10mg IVP.   10/5: BD5, o/n 10mg IVP hydralazine given for SBP 170s and started on hydralazine 25q8 via OGT. 10mg IV push labetalol for SBP > 160s. RT placed for diarrhea.   10/6: BD6, o/n FW increased to 350q4 per nephrology recs. IV tylenol for temp 100.6, SBp 160s presumed uncomfortable.   10/7: BD7, overnight pancultured for temp 101.8F.   10/8: BD8. GEORGE. Cr bumped. decreased LR to 75cc/hr. Adding simethicone ATC. incr hydralazine 50mgTID. Incr labetalol 300mgTID. Na 145, decreased FWF to 250q6. Start precedex. FENa consistent with intrinsic kidney injury. Pend repeat renal US. Retaining up to 1.3L, bladder scans q6, straight cath PRN  10/9: BD 9. GEORGE overnight. Neuro stable. abd xray for distention w non-specific gas pattern, OGT to LIWS for morning. duonebs/mucomyst to q8 for improving secretions. Changed tube feeds to Jevity 1.5 20cc/hr, low rate due to abdominal distention, nepro dense and more difficult to digest. Tolerating CPAP, confirmed by ABG.   10/10: BD 10. GEORGE overnight. Neuro stable. (+) gabriel for urinary retention on bladder scan. inc TF to goal rate of 40cc/hr. family leaning toward pursuing trach/PEG. 1/2 amp for FS 81.   10/11: BD 11. GEORGE overnight. Neuro stable. Trach/PEG consults placed.   10/12: BD 12. GEORGE overnight. Neuro stable. MRI brain complete.   10/13: BD 13. Increase flomax. Hold SQH after PM dose for trach tm. IVL.   10/14: BD 14. GEORGE overnight, remains on AC/VC. Gabriel placed for urinary retention. Dc'd free water.  S/p trach with pulm. NGT placed and CXR confirmed in good position.   10/15: BD 15, GEORGE ovn. resumed feeds. spiked 101, pan cx sent.   10/16: BD 16. GEORGE ovn. Lokelma 5mg for K+ 5.4. Started vanc q 24/zosyn for empiric PNA coverage, IVF to 100/hr. PEG held for fever.   10/17: BD 17,  ordered serum osm and urine osm for am. Started sinemet for neurostimulation. Increased cardura to 0.8. Started FW 100q4, dc'd IVF. MRSA negative, dc'd vanc. NGT replaced d/t coiling.   10/18: BD 18, GEORGE overnight, neuro stable. Amantadine added for neurostim. zosyn changed to unasyn for acinetobacter baumannii, failed TOV and required SC  10/19: BD 19, GEORGE ovn. cardura 2mg added for retention. labetalol decreased 200q8, hydralazine decreased 25q8. Gabriel replaced.   10/20: BD20, GEORGE overnight. NGT dislodged, replaced. PEG tomorrow w/ gen surg, FW increased to 150q4 and labetalol decreased to 100q8, lokelma given for hyperkalemia.   10/21: BD 21. POD0 PEG placement with Gen surg. decr labetolol to 50q8, incr. cardura to 0.4, started lokelma and phoslo, dc gabriel POD0 PEG placement with Gen surg.  10/22: BD 22. Plan to start TF today via PEG. dc labetalol, Following ophtho recs. Increased apnea settings - found to be in cheyne-moe respiration. CPAP 5/5.  10/23: BD 23. hydralazine d/c'd, trach collar trial today. Rectal tube placed at 6am.  10/24: BD24, o/n lokelma held due to diarrhea. Free water 100q6 resumed. dc'd tamsulosin, amantadine. Incr'd cardura to 8mg qhs. Dc'd FW. Switched jevity to nepro. gabriel placed for high urine output. Started SL atropine for oral secretions. Dc'd free water.  10/25: BD25, o/n decreased suctioning requirements to > q4hrs, GEORGE. Cr improving, cont phoslo, lokelma held at this time. Gabriel placed yest, cont. Tolerating trach collar. Given 500cc plasmalyte bolus for ANIKA. Dc'd sinemet.   10/26: BD26, o/n resumed lokelma 5mg daily and resumed 100cc free water q6hrs. Change in neuro status with new right pupillary dilation with anisocoria (right pupil 6mm fixed and left pupil 3mm briskly reactive). Given 23.4% NaCl bullet, taken for emergent CTH showing mostly resolved pontine hemorrhage, continued brainstem hypodensity likely edema d/t hemorrhage, no new hemorrhage or infarct, no herniation, mild increase in size of left lateral ventricle. Vitals remaine stable. Na goal > 140.   10/27: BD27, o/n GEORGE.Neuro stable. Pend stepdown with airway bed.   10/28: BD 28. GEORGE overnight. Neuro stable. Miralax ordered. Gabriel removed, pending TOV.  10/29: BD 29. GEORGE o/n. Given 2L NS over 8 hrs for increased BUN/Cr ratio. Gabriel placed for frequent straight cath.   10/30: BD 30.   10/31: BD 31. GEORGE overnight. Na 149, increased free water to 200q6. 1L NS for uptrending BUN.   11/1: BD 32. GEORGE overnight. Given 1L NS for dehydration. Na 146, increased FW to 250q6.   11/2: BD 33 GEORGE overnight, neuro stable, given 500cc bolus for net negative status and tachycardia   11/3: BD 34, GEORGE overnight, neuro stable. Patient remains tachycardic, EKG showing sinus tachycardia, given additional 500cc NS bolus. Febrile to 101.9F, pan cultured (without UA), CXR WNL, given tylenol.   11/4: BD 35, GEORGE overnight, neuro stable. Given 1L NS for tachycardia. sputum (+) for stenotropohomas maltophilia.   11/5: BD 36 GEORGE overnight, neuro stable. Vancomycin dc'd. Chest PT BID. ID consulted, cont zosyn.  11/6: BD 37. blood cx + klebsiella dc zosyn changed to cefepime, CTAP ordered, rpt blood cx sent.    11/7: BD 38. Pending CT A/P, given 250cc bolus and starting maintenance fluids overnight. Pending CT A/P after bolus   11/8: BD 39. CT CAP negative for infection.   11/9: BD 40. GEORGE overnight.  11/10: BD 41. GEORGE overnight. desat to 85 on trach collar, O2 inc to 10L and 100%, O2 sat inc to 95. pt tachy to 110s, euvolemic. given tylenol. ABG and CXR ordered. spiked fever, pancultured, RVP negative. AM ABG w pO2 79, rpt w pO2 79. pt appears comfortable, satting 94%.   11/11: BD 42. GEORGE overnight. pt became tachy to 130s, desat to 90 on 100% FiO2 and 10L. suctioned, (+) productive cough. temp 101.4, given 1g IV tylenol and 500cc NS bolus for euvolemia. fever and HR downtrending. LE dopplers negative for dvt  11/12: BD 43, GEORGE ovn, fever and HR downtrending, satting 97% 70% FIO2  11/13: BD 44, GEORGE ovn. started standing tylenol x24 hours for tachycardia. desat to 80s, o2 increased. CXR stable, pending CTA PE protocol.   11/14: BD 45, GEORGE overnight, neuro stable. resp therapy dec FiO2 to 70%.   11/15: BD 46, GEORGE overnight, neuro stable.  Rapid called for desaturation 30s, tachycardic 140s. Patient bagged, 100% fio2, heavily suctioned. CXR/POCUS unremarkable. ABG c/w desaturation. WBC 14.71. Afebrile. O2 improved to 90s and patient upgraded to ICU. ABG paO2 30s improved to 89 on vent. IV Tylenol x 1, sputum sent. Start protonix while o-n vent.   11/16: BD 47. POCUS showed collapsable IVCF, given 1L bolus. Vanco/Cefpime added empriic for PNA, NGT feeds restarted. MRSA swab neg, Vanc DC'd.   11/17: BD 48. GEORGE overnight. 1l bolus for tachycardia. Spiked to 101, cultured. 500cc bolus for tachycardia, tachy to 148 given 25mcg fentanyl, 250cc albumin, 1.5L bolus. 5 IV lopressor with response HR to 100s. +Stenotrophomonas on sputum cx.   11/18: BD 49. GEORGE overnight. Consulted ID, cefepime switched to bactrim x7days. Started hydrochlorothiazine 12.5mg daily.  11/19: BD 50. Tachy 120s, given tylenol and 500cc NS. Tolerating 5/5, switched to TCx. Holding phos binder. D/c Bactrim. D/c gabriel, f/u TOV. Dc'd PPI.   11/20: BD 51. GEORGE ovn. 1600 satting low 90s, mildly tachy to 110s, afebrile, RR wnl. O2 improved to mid 90s while inc O2 to 100% on TCx. CPAP 5/5 placed back on.  11/21: BD 52, GEORGE ovn, tolerating CPAP 5/5. Switch to trach collar during the day if tolerating well. HCTZ held for Cr bump, straight cath frequence increased to q4  11/22: BD 53, GEORGE ovn. Resumed phoslo. Gabriel placed. Resumed HCTZ.   11/23: BD 54. Holding tylenol in setting of possible fever, will require pan cx if febrile. Cr improved today. Cont CPAP. Bowel regimen held i/s/o diarrhea. FOBT negative.  11/24: BD 55. GEORGE overnight. Neuro stable. HCTZ dc'ed, started lisinopril 5. Lokelma dc'ed for K 3.7.   11/25: BD 56, GEORGE overnight. Neuro stable. dc'd lisinopril 5mg. Gabriel dc'd. TOV. 1545 noted to be hypotensive, MAP 50, in supine position on chair, HR 60s, afebrile, O2 96%. Given 1L cc NS bolus, placed back on bed in reverse trendelenberg, improved to map of 66. Neostick at bedside. Vitals check q1h. Dc'ed amlodipine. Failed TOV, bladder scan q6, sc prn. Added back Senna.   11/26: BD 57, GEORGE overnight. Neuro stable. Dc'd phoslo.   11/27: BD 58, GEORGE overnight. Neuro stable.    11/28: BD 59. Gabriel replaced.   11/29: GEORGE.  11/30: GEORGE, neuro stable.   12/1: BD 62, GEORGE overnight  12/2: BD 63, GEORGE overnight.; Given 1L bolus of LR for uptrending BUN/Cr.  12/3: BD 64. Reinstated eye gtt/moisture chamber given increased Rt eye injection  12/4: BD 65. GEORGE overnight. Attempted to speak with ophtho regarding eyelid weight/closure but no answer, full mailbox.   12/5: BD 66, GEORGE overnight, bowel regimen increased and had BM.   12/6: BD 67, GEORGE overnight, neuro stable.  12/7: GEORGE overnight, neuro stable. /110s, given x1 hydralazine 10 mg IVP. Restarted home amlodipine 5mg.  12/8: GEORGE. OOB to chair.     12/11: GEORGE, mucomyst added for thick secretions, simethicone for abd distension, abd xray with stool burden, increased bowel regimen.   12/12: GEORGE overnight.   12/13: GEORGE overnight.   12/14: GEORGE overnight  12/15: o/n Patient became tachycardic HR 120s and 10 minutes later O2 sat dropped as low as 89%. Patient suctioned without improvement in O2 sat and tube feeds found in suction catheter. TFs held.  STAT CXR ordered. STAT labs sent. Respiratory therapist called to bedside and patient trach connected to ventilator. After connection to ventilator and further suctioning O2 sat improved to 97% but patient HR remains 120-130s. Upgraded to NSICU for further management. Vancomycin and zosyn started. CTA  chest PE protocol and CTH ordered. Blood cultures sent.given 500cc bolus, rpt ABG sent pO2 243, CTH and CTA chest done. FS while NPO, FiO2 dec 50 pending ABG. sputum cx positive for few GPC and GNR.   12/16: GEORGE overnight, restarted amlodipine, troponin 75   12/17: GEORGE overnight, neuro stable. Attempted CPAP this morning, did not tolerate and back on full vent support. Dc'd Vanc. Tachycardia to 140s, noted extremities to be twitching along with jaw twitching. Given 2g of Keppra, total 4mg ativan and placed on EEG, full set of labs and lactate negative. Resumed trickle feeds at 20cc/hr. Given 250cc albumin for tachycardia.   12/18: GEORGE overnight. Neuro stable. CTH stable. EEG negative and dc'd.   12/19: GEORGE overnight. neuro stable. SIMV most of day, AC/VC at night.   12/20: GEORGE overnight, neuro stable. remains on VC/AC  12/21: GEORGE ovn. 1L bolus for tachy to 120s-130s.   12/22: GEORGE ovn. Tachy to 120s, given tylenol and IVF. 2mg IV ativan given for L jaw twitching. Sx resolved for 3 minutes and started again. Epilepsy contacted. Given 2mg IV ativan. Continued twitching. Increased keppra to 1500mg BID, 2mg ativan given. Propranolol started for refractory tachycardia. vEEG ordered. albumin given. POCUS performed, no b lines. Started on fosphenytoin, loaded w 20mg/kg then 100mg TID. febrile, pancx, started cefepime 2g q8, vancomycin  12/23: GEORGE ovn. +focal motor seizures on eeg. ID consulted. Vancomycin dc'ed as per ID.  12/24: GEORGE ovn, neuro stable. Baclofen 5 mg q12 started for hiccups. Na 129 from 134. Urine lytes c/w SIADH. Given 250cc 3% bolus. CT chest for infection w/u - f/u read. Repeat Na 136. UA negative  12/25: GEORGE ovn.   12/26: GEORGE ovn  12/27: GEORGE overnight. Blood cx neg @ 4 days, DC cefepime per medicine (sputum colonized).   12/28: Desaturation o/n to 80's, CXR obtained, pulse ox changed and sats resolved. Na 133 from 138, 250cc 3% bolus given. Cefepime resumed until 12/30 per ID. Rpt Na 138.  12/29: GEORGE overnight. Na stable.   12/30: GEORGE overnight. Family meeting with son, pt now DNR.   12/31: GEORGE overnight.   1/1: GEORGE overnight, neuro stable.  1/2: GEORGE overnight, neuro stable. FW decreased to 100q6.   1/3: GEORGE overnight, neuro stable. fosphenytoin IV changed to pheytoin PO via PEG per epilepsy recommendations  1/4: GEORGE overnight, neuro stable. FW incr. to 100q4  1/5: GEORGE overnight, neuro stable.   1/6: GEORGE overnight, neuro stable   1/7: GEORGE overnight, neuro stable. BUN/Cr increased, increased FW to 200q6. Given 1L bolus of LR over 2 hours. Gabriel d/c'd, voiding, continue bladder scan q6.   1/8: GEORGE overnight, neuro stable. BUN/Cr improving.  1/9: GEORGE overnight, neuro stable.   1/10: GEORGE overnight, neuro stable.   1/11: GEORGE overnight, neuro stable, vent settings stable.   1/12: GEORGE overnight, neuro stable. Febrile to 102F, f/u pan cx, chest xray, given tylenol. Zosyn started.   1/13: GEORGE overnight, neuro stable, cont Zosyn for presumed PNA, prelim sputum cx growing; few GS neg diplococci, mod GS neg rods, rare GS + rods, fever trend improved. Free water increased to 133lzz0.   1/14: GEORGE overnight. pending renal US for elevated Cr  1/15: GEORGE ovn. renal US complete.   1/16: GEORGE overnight, neuro stable   1/17: GEORGE overnight, neuro stable   1/18: GEORGE overnight, neuro stable.   1/19: GEORGE overnight, neuro stable. Propranolol dec to 5q8.   1/20: GEORGE overnight, neuro stable. Weaned propranolol to 5mg BID.   1/21: GEORGE ovn, in AM having rapid vertical eye movements with facial twitching, 2g total keppra given for AM dose and phenytoin level ordered, vital signs stable. CTH stable. Phenytoin increased to 100/100/150mg per epilepsy  1/22: GEORGE ovn. IV hydral x 1 for SBP 170s.   1/23: Cont to have focal motor seizures. Per epilepsy, changed phenytoin dose to 100/100/200.   1/24: Phenytoin lvl tmrw AM. Chest Xray completed due to temp 100.2F  1/25: GEORGE overnight. Incr dilantin morning and afternoon per epilepsy.   1/26: GEORGE overnight. Sustained focal twitching noticed by nursing. Ativan 8mg in total given. Fosphenytoin 1500mg IV given. Placed on EEG. Epilepsy following. TFs held. Phenytoin increased to 150/150/200.   1/27: o/n CTH completed due to continued lethargy after ativan given yesterday afternoon. TFs resumed. 500cc bolus NS given for SBP 90s. EEG dc'ed, discussed w/ Dr. Tomlin. Febrile 101F, pan cx sent. Likely left sided infiltrate on CXR, c/f aspiration PNA. Started on vanc/zosyn. MRSA swab pending.   1/28: Neuro stable, on vanc/zosyn. +UTI on UA, MRSA negative. Vanc dc'd. RVP negative. Zosyn increased to pseudomonal dosing.   1/29: GEORGE overnight, neuro stable.  1/30: GEORGE overnight, neuro stable. Dc'd zosyn due to resistance, switched to Levaquin.  1/31: GEORGE ovenight, neuro stable.   2/1: Brief desat. Improved with neb and reposititioning. ABG and POCUS wnl.   2/4: GEORGE overnight  2/5: GEORGE overnight  2/6: GEORGE ovn  2/7: GEORGE ovn. L eye redness noted, started erythromycin and lacrilube to L eye as well. LR @ 100 cc/hr x 10 hours for uptrending BUN/Cr. Resumed bowel reg.     OVERNIGHT EVENTS: GEORGE overnight, neuro stable.    Vital Signs Last 24 Hrs  T(C): 37.1 (07 Feb 2025 21:19), Max: 37.1 (07 Feb 2025 21:19)  T(F): 98.7 (07 Feb 2025 21:19), Max: 98.7 (07 Feb 2025 21:19)  HR: 80 (07 Feb 2025 20:52) (68 - 106)  BP: 106/65 (07 Feb 2025 20:15) (99/69 - 116/76)  BP(mean): 81 (07 Feb 2025 20:15) (80 - 91)  RR: 14 (07 Feb 2025 20:52) (14 - 18)  SpO2: 97% (07 Feb 2025 20:52) (92% - 100%)    Parameters below as of 07 Feb 2025 20:52  Patient On (Oxygen Delivery Method): ventilator    O2 Concentration (%): 40    I&O's Summary    06 Feb 2025 07:01  -  07 Feb 2025 07:00  --------------------------------------------------------  IN: 1900 mL / OUT: 1400 mL / NET: 500 mL    07 Feb 2025 07:01  -  08 Feb 2025 00:20  --------------------------------------------------------  IN: 2824 mL / OUT: 925 mL / NET: 1899 mL    PHYSICAL EXAM:  General: patient seen laying supine in bed in NAD, Amharic speaking  Neuro: opens eyes spontaneously, A+Ox3, tracks vertically, RUE squeezes hand to command, RLE wiggles toes to command, LUE 0/5, b/l lower extremities withdraw to noxious   HEENT: PERRL, +trach to vent  Neck: supple  Cardiac: RRR, S1S2  Pulmonary: chest rise symmetric, nonlabored breathing, +trach  Abdomen: soft, nontender, nondistended, +PEG  Ext: perfusing well  Skin: warm, dry    DIET:  [] NPO  [] Mechanical  [X] Tube feeds    LABS:                        10.6   6.97  )-----------( 232      ( 07 Feb 2025 07:14 )             31.7     02-07    138  |  99  |  52[H]  ----------------------------<  119[H]  4.4   |  24  |  1.19    Ca    9.0      07 Feb 2025 07:14  Phos  4.3     02-07  Mg     2.3     02-07    TPro  6.9  /  Alb  3.5  /  TBili  0.2  /  DBili  x   /  AST  12  /  ALT  16  /  AlkPhos  172[H]  02-07      Urinalysis Basic - ( 07 Feb 2025 07:14 )    Color: x / Appearance: x / SG: x / pH: x  Gluc: 119 mg/dL / Ketone: x  / Bili: x / Urobili: x   Blood: x / Protein: x / Nitrite: x   Leuk Esterase: x / RBC: x / WBC x   Sq Epi: x / Non Sq Epi: x / Bacteria: x      Allergies    Allergy Status Unknown    Intolerances      MEDICATIONS:  Antibiotics:    Neuro:  acetaminophen   Oral Liquid .. 650 milliGRAM(s) Oral every 6 hours PRN  baclofen 5 milliGRAM(s) Oral every 12 hours  levETIRAcetam 1500 milliGRAM(s) Oral every 12 hours  LORazepam   Injectable 2 milliGRAM(s) IV Push once PRN  phenytoin   Suspension 150 milliGRAM(s) Oral <User Schedule>  phenytoin   Suspension 200 milliGRAM(s) Oral <User Schedule>    Anticoagulation:  heparin   Injectable 5000 Unit(s) SubCutaneous every 8 hours    OTHER:  acetylcysteine 10%  Inhalation 4 milliLiter(s) Inhalation every 6 hours  albuterol/ipratropium for Nebulization 3 milliLiter(s) Nebulizer every 6 hours  amLODIPine   Tablet 5 milliGRAM(s) Oral daily  artificial tears (preservative free) Ophthalmic Solution 1 Drop(s) Right EYE every 4 hours  chlorhexidine 0.12% Liquid 15 milliLiter(s) Oral Mucosa every 12 hours  chlorhexidine 2% Cloths 1 Application(s) Topical <User Schedule>  doxazosin 8 milliGRAM(s) Oral at bedtime  erythromycin   Ointment 1 Application(s) Both EYES at bedtime  fluticasone propionate 50 MICROgram(s)/spray Nasal Spray 1 Spray(s) Both Nostrils two times a day  influenza   Vaccine 0.5 milliLiter(s) IntraMuscular once  ofloxacin 0.3% Solution 1 Drop(s) Right EYE four times a day  pantoprazole   Suspension 40 milliGRAM(s) Oral daily  petrolatum Ophthalmic Ointment 1 Application(s) Both EYES two times a day  propranolol 5 milliGRAM(s) Oral every 12 hours  senna 2 Tablet(s) Oral at bedtime  sodium chloride 0.65% Nasal 1 Spray(s) Both Nostrils two times a day    IVF:  lactated ringers. 1000 milliLiter(s) IV Continuous <Continuous>    CULTURES:  Culture Results:   No growth at 5 days (02-01 @ 19:45)  Culture Results:   >100,000 CFU/ml Klebsiella aerogenes (Previously Enterobacter) (01-27 @ 23:20)    ASSESSMENT:  44M PMH ?stroke/MI present to [facility] after collapsing at work. Decorticate posturing, vomiting, intubated for airway protection. 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 Transferred to [facility] for further management. s/p trach 10/14. s/p peg 10/21. Re-upgrade to ICU 2/2 desaturation event and suctioning requirements 11/15. Re-upgrade to NSICU 12/15 2/2 desaturation and tachycardia.    AMS    Intubate    Handoff    MEWS Score    Acute myocardial infarction    CVA (cerebral vascular accident)    Intracerebral hemorrhage of brain stem    Brainstem stroke    Brain stem stroke syndrome    Brain stem hemorrhage    Brain stem stroke syndrome    Percutaneous tracheal puncture    Hemorrhagic stroke    Brainstem stroke    Pontine hemorrhage    Brainstem stroke    Encephalopathy acute    Functional quadriplegia    Advanced care planning/counseling discussion    Encounter for palliative care    Pontine hemorrhage    Neurogenic dysphagia    Chronic respiratory failure    Acute kidney injury superimposed on CKD    Acute urinary retention    Hypertensive emergency    Sepsis, unspecified organism    Sepsis    Gram-negative bacteremia    Dyspnea    Percutaneous tracheal puncture    Altered mental status examination    Hypertension    Seizures    [email or web address]      PLAN:  Neuro:  - neuro/vitals q4h  - pain control: tylenol prn  - seizure tx: keppra 1500mg BID, phenytoin 150/150/200  - s/p vEEG (1/26-1/27), (10/3-4), (10/17-10/19), (12/17-12/18), (12/22-12/25), (1/26-1/28),  +focal motor seizures not correlating w/ EEG  - CTH 9/30: enlarged pontine hemorrhage, CTH 10/3: stable, CTH 10/25: mostly resolved pontine hemorrhage, CTH 12/15: R mastoid air cell opacification; acute otitis media vs sterile effusion, CTH 12/18: stable. CTH 1/21 stable, CTH 1/27 stable  - MRI brain 10/12: parenchymal hemorrhage, acute/subacute R cerebellar stroke      CV:  - -160  - tachycardia: propranolol 5mg BID   - HTN: amlodipine 5mg  - echo (9/30) EF 75%, repeat 12/16: 57%    Resp:  - trach to vent, AC/VC 40/400/14/6  - Secretions: duonebs/mucomyst/chest PT q6h     GI:  - TFs via PEG  - bowel regimen, last BM 2/4  - baclofen 5q12 for hiccups    Renal:  - IVL  - FW 200cc q4h for hydration  - Voiding, SC prn  - CKD: trend BUN/Cr  - renal US 10/1, 10/8, 1/15: Increased bilateral renal echogenicity consistent with medical renal disease    Endo:  - A1c 5.4    Heme:  - DVT ppx: SCDs, SQH 5000u q8h   - LE dopplers negative 12/16    ID:  - last pan cx 1/27  - +klebsiella UTI s/p levaquin (1/30-2/3)  - empiric PNA: cefepime (12/22-12/30), s/p vanc (12/22-12/23), s/p zosyn (12/15-12/20), s/p vanc (12/15-12/16), s/p zosyn (1/12 -1/18)  - s/p Stenotrophomonas maltophilia PNA: s/p Bactrim (11/18-11/19) s/p Cefepime (11/16-11/18)  - 11/3, (+) sputum for stenotrophomas maltophlia, blood cx (+) klebsiella, cefepime 2gq12 (11/6 - 11/12)   - S/p Ancef (10/4-10/14) for PNA, and s/p Unasyn (10/18-10/23) +actinobacter baumanii     MISC:  - ophtho consult for keratitis  - erythromycin ointment b/l eye q4hrs, ofloxacin ointment R eye QID, artificial tears R eye q2hrs, moisture chamber at bedtime    Dispo: SDU status, DNR, pending PRUCOL for benefits     D/w Dr. D'Amico

## 2025-02-08 NOTE — PROGRESS NOTE ADULT - SUBJECTIVE AND OBJECTIVE BOX
OVERNIGHT EVENTS:  NAEO per nursing staff , no events of Telemetry     SUBJECTIVE : patient does not talk , he was awake and resting in bed     PHYSICAL EXAM:  General: NAD, on vent through trach, breathing is nonlabored  HEENT: trach collar in place  Lungs: clear to auscultation anteriorly  Heart: RRR, normal s1s2  Abdomen: soft, no distension, + BS, PEG tube in place, site is clean and dry   Extremities:  warm, wwp     VITAL SIGNS:  Vital Signs Last 24 Hrs  T(C): 36.3 (08 Feb 2025 09:18), Max: 37.1 (07 Feb 2025 21:19)  T(F): 97.4 (08 Feb 2025 09:18), Max: 98.7 (07 Feb 2025 21:19)  HR: 68 (08 Feb 2025 10:45) (66 - 80)  BP: 119/89 (08 Feb 2025 08:34) (97/62 - 119/89)  BP(mean): 100 (08 Feb 2025 08:34) (75 - 100)  RR: 18 (08 Feb 2025 10:45) (14 - 18)  SpO2: 99% (08 Feb 2025 10:45) (93% - 100%)    Parameters below as of 08 Feb 2025 10:45  Patient On (Oxygen Delivery Method): ventilator    O2 Concentration (%): 40    MEDICATIONS:  MEDICATIONS  (STANDING):  acetylcysteine 10%  Inhalation 4 milliLiter(s) Inhalation every 6 hours  albuterol/ipratropium for Nebulization 3 milliLiter(s) Nebulizer every 6 hours  amLODIPine   Tablet 5 milliGRAM(s) Oral daily  artificial tears (preservative free) Ophthalmic Solution 1 Drop(s) Right EYE every 4 hours  baclofen 5 milliGRAM(s) Oral every 12 hours  chlorhexidine 0.12% Liquid 15 milliLiter(s) Oral Mucosa every 12 hours  chlorhexidine 2% Cloths 1 Application(s) Topical <User Schedule>  doxazosin 8 milliGRAM(s) Oral at bedtime  erythromycin   Ointment 1 Application(s) Right EYE at bedtime  fluticasone propionate 50 MICROgram(s)/spray Nasal Spray 1 Spray(s) Both Nostrils two times a day  heparin   Injectable 5000 Unit(s) SubCutaneous every 8 hours  influenza   Vaccine 0.5 milliLiter(s) IntraMuscular once  levETIRAcetam 1500 milliGRAM(s) Oral every 12 hours  ofloxacin 0.3% Solution 1 Drop(s) Right EYE four times a day  pantoprazole   Suspension 40 milliGRAM(s) Oral daily  petrolatum Ophthalmic Ointment 1 Application(s) Both EYES two times a day  phenytoin   Suspension 150 milliGRAM(s) Oral <User Schedule>  phenytoin   Suspension 200 milliGRAM(s) Oral <User Schedule>  propranolol 5 milliGRAM(s) Oral every 12 hours  sodium chloride 0.65% Nasal 1 Spray(s) Both Nostrils two times a day        LABS:                                                         10.6   6.97  )-----------( 232      ( 07 Feb 2025 07:14 )             31.7     02-07    138  |  99  |  52[H]  ----------------------------<  119[H]  4.4   |  24  |  1.19    Ca    9.0      07 Feb 2025 07:14  Phos  4.3     02-07  Mg     2.3     02-07    TPro  6.9  /  Alb  3.5  /  TBili  0.2  /  DBili  x   /  AST  12  /  ALT  16  /  AlkPhos  172[H]  02-07              CAPILLARY BLOOD GLUCOSE          RADIOLOGY & ADDITIONAL TESTS: Reviewed.

## 2025-02-08 NOTE — PROGRESS NOTE ADULT - ASSESSMENT
44M with unclear PMH (?stroke, MI) who was found down at work, intubated for airway protection and found to have acute parenchymal hemorrhage within nabil with mass effect (+ acute/subacute right cerebellar infarct) in setting of hypertensive emergency, transferred to [facility] for further neurosurgical care. Hospital course c/b poor neurologic recovery s/p trach-PEG, AUR s/p gabriel, ANIKA on CKD c/b hyperkalemia, HAP s/p amp-sulbactam (EOT 10/23), K aerogenes bacteremia  treated with 2 weeks of Cefepime per ID , On 11/15 he became septic and was transferred to NSICU due to increased o2 requirements and needed Vent support , Trach Cultures + for Stenotrophomonas which was treated with 7 days of Bactrim per ID , has been weaned of Vent since 11/23 and is now on 10lts at 40% o2 through Trach Collar. Stepped up to the NSICU on 12/15 for hypoxia and tachycardia. PE ruled out. On abx for 5 days. Unclear etiology. Now stepped down to 8Lach.    # Pontine hemorrhage  # Encephalopathy due to Intracranial Bleed   - Acute parenchymal hemorrhage within nabil with mass effect (+ acute/subacute right cerebellar infarct) in setting of hypertensive emergency.   - MRI brain also demonstrated acute/subacute R cerebellar stroke.   - No Neurosurgical Interventions were performed   - Secondary to IPH and cerebellar stroke - Trach and PEG Dependent    # Klebsiella UTI  - remains afebrile  - Urine cx 1/27 growing Klebsiella   - MRSA nares negative  - Completed 5 day course of Levaquin    # Seizures  - Continue Seizure precautions   - Continue Keppra and phenytoin - appreciate further recs from epilepsy    # Hypertension  - amlodipine 5mg daily with holding parameters     # Chronic respiratory failure.   - s/p percutaneous trach by pulm on 10/14/24  - Currently on Vent Support   - Continue Chest PT    # Neurogenic dysphagia.   - Pt s/p PEG with surgery 10/21/24  - TF per nutrition  - aspiration precautions and elevation of HOB.    # Acute urinary retention.   - doxazosin 8mg  - monitor urine output    # Functional quadriplegia in setting of brainstem hemorrhage  - decub precautions  - care per nursing protocol     # CKD stage 3 , Baseline Creatinine 1.1   - IV LR 100ml/hr for 10 hours   - Ensure patient receives all his Free water Flushes and Continues to Receive Tube Feeds ( Total would be 2280ml/day - Tube Feeds 1080ml , Free Water 1200 ml)     35 minutes spent on this encounter  including face to face with patient, review of chart, care coordination and documentation.  plan discussed with neurosurgery team.

## 2025-02-09 LAB
ANION GAP SERPL CALC-SCNC: 11 MMOL/L — SIGNIFICANT CHANGE UP (ref 5–17)
BUN SERPL-MCNC: 48 MG/DL — HIGH (ref 7–23)
CALCIUM SERPL-MCNC: 9 MG/DL — SIGNIFICANT CHANGE UP (ref 8.4–10.5)
CHLORIDE SERPL-SCNC: 100 MMOL/L — SIGNIFICANT CHANGE UP (ref 96–108)
CO2 SERPL-SCNC: 26 MMOL/L — SIGNIFICANT CHANGE UP (ref 22–31)
CREAT SERPL-MCNC: 1.15 MG/DL — SIGNIFICANT CHANGE UP (ref 0.5–1.3)
EGFR: 79 ML/MIN/1.73M2 — SIGNIFICANT CHANGE UP
EGFR: 79 ML/MIN/1.73M2 — SIGNIFICANT CHANGE UP
GLUCOSE SERPL-MCNC: 110 MG/DL — HIGH (ref 70–99)
HCT VFR BLD CALC: 32.7 % — LOW (ref 39–50)
HGB BLD-MCNC: 10.6 G/DL — LOW (ref 13–17)
MAGNESIUM SERPL-MCNC: 2.2 MG/DL — SIGNIFICANT CHANGE UP (ref 1.6–2.6)
MCHC RBC-ENTMCNC: 31.5 PG — SIGNIFICANT CHANGE UP (ref 27–34)
MCHC RBC-ENTMCNC: 32.4 G/DL — SIGNIFICANT CHANGE UP (ref 32–36)
MCV RBC AUTO: 97.3 FL — SIGNIFICANT CHANGE UP (ref 80–100)
NRBC # BLD: 0 /100 WBCS — SIGNIFICANT CHANGE UP (ref 0–0)
NRBC BLD-RTO: 0 /100 WBCS — SIGNIFICANT CHANGE UP (ref 0–0)
PHOSPHATE SERPL-MCNC: 3.9 MG/DL — SIGNIFICANT CHANGE UP (ref 2.5–4.5)
PLATELET # BLD AUTO: 224 K/UL — SIGNIFICANT CHANGE UP (ref 150–400)
POTASSIUM SERPL-MCNC: 5 MMOL/L — SIGNIFICANT CHANGE UP (ref 3.5–5.3)
POTASSIUM SERPL-SCNC: 5 MMOL/L — SIGNIFICANT CHANGE UP (ref 3.5–5.3)
RBC # BLD: 3.36 M/UL — LOW (ref 4.2–5.8)
RBC # FLD: 13.1 % — SIGNIFICANT CHANGE UP (ref 10.3–14.5)
SODIUM SERPL-SCNC: 137 MMOL/L — SIGNIFICANT CHANGE UP (ref 135–145)
WBC # BLD: 8.46 K/UL — SIGNIFICANT CHANGE UP (ref 3.8–10.5)
WBC # FLD AUTO: 8.46 K/UL — SIGNIFICANT CHANGE UP (ref 3.8–10.5)

## 2025-02-09 PROCEDURE — 99232 SBSQ HOSP IP/OBS MODERATE 35: CPT

## 2025-02-09 RX ADMIN — Medication 1 APPLICATION(S): at 05:41

## 2025-02-09 RX ADMIN — FLUTICASONE PROPIONATE 1 SPRAY(S): 50 SPRAY, METERED NASAL at 05:39

## 2025-02-09 RX ADMIN — AMLODIPINE BESYLATE 5 MILLIGRAM(S): 10 TABLET ORAL at 05:40

## 2025-02-09 RX ADMIN — ACETYLCYSTEINE 4 MILLILITER(S): 200 INHALANT RESPIRATORY (INHALATION) at 18:58

## 2025-02-09 RX ADMIN — OFLOXACIN 1 DROP(S): 3 SOLUTION OPHTHALMIC at 05:37

## 2025-02-09 RX ADMIN — Medication 40 MILLIGRAM(S): at 12:27

## 2025-02-09 RX ADMIN — Medication 1 DROP(S): at 13:34

## 2025-02-09 RX ADMIN — IPRATROPIUM BROMIDE AND ALBUTEROL SULFATE 3 MILLILITER(S): .5; 2.5 SOLUTION RESPIRATORY (INHALATION) at 12:26

## 2025-02-09 RX ADMIN — BACLOFEN 5 MILLIGRAM(S): 10 INJECTION INTRATHECAL at 18:57

## 2025-02-09 RX ADMIN — Medication 1 SPRAY(S): at 05:38

## 2025-02-09 RX ADMIN — HEPARIN SODIUM 5000 UNIT(S): 1000 INJECTION INTRAVENOUS; SUBCUTANEOUS at 22:05

## 2025-02-09 RX ADMIN — Medication 1 DROP(S): at 22:06

## 2025-02-09 RX ADMIN — Medication 2 TABLET(S): at 22:04

## 2025-02-09 RX ADMIN — HEPARIN SODIUM 5000 UNIT(S): 1000 INJECTION INTRAVENOUS; SUBCUTANEOUS at 13:34

## 2025-02-09 RX ADMIN — ERYTHROMYCIN 1 APPLICATION(S): 5 OINTMENT OPHTHALMIC at 22:04

## 2025-02-09 RX ADMIN — Medication 1 DROP(S): at 01:07

## 2025-02-09 RX ADMIN — ACETYLCYSTEINE 4 MILLILITER(S): 200 INHALANT RESPIRATORY (INHALATION) at 12:27

## 2025-02-09 RX ADMIN — IPRATROPIUM BROMIDE AND ALBUTEROL SULFATE 3 MILLILITER(S): .5; 2.5 SOLUTION RESPIRATORY (INHALATION) at 05:16

## 2025-02-09 RX ADMIN — LEVETIRACETAM 1500 MILLIGRAM(S): 10 INJECTION, SOLUTION INTRAVENOUS at 18:55

## 2025-02-09 RX ADMIN — HEPARIN SODIUM 5000 UNIT(S): 1000 INJECTION INTRAVENOUS; SUBCUTANEOUS at 05:36

## 2025-02-09 RX ADMIN — Medication 200 MILLIGRAM(S): at 22:04

## 2025-02-09 RX ADMIN — ACETYLCYSTEINE 4 MILLILITER(S): 200 INHALANT RESPIRATORY (INHALATION) at 05:16

## 2025-02-09 RX ADMIN — IPRATROPIUM BROMIDE AND ALBUTEROL SULFATE 3 MILLILITER(S): .5; 2.5 SOLUTION RESPIRATORY (INHALATION) at 18:55

## 2025-02-09 RX ADMIN — Medication 1 DROP(S): at 10:20

## 2025-02-09 RX ADMIN — Medication 1 SPRAY(S): at 18:56

## 2025-02-09 RX ADMIN — Medication 15 MILLILITER(S): at 18:56

## 2025-02-09 RX ADMIN — Medication 15 MILLILITER(S): at 05:16

## 2025-02-09 RX ADMIN — Medication 1 DROP(S): at 18:56

## 2025-02-09 RX ADMIN — POLYETHYLENE GLYCOL 3350 17 GRAM(S): 17 POWDER, FOR SOLUTION ORAL at 12:27

## 2025-02-09 RX ADMIN — LEVETIRACETAM 1500 MILLIGRAM(S): 10 INJECTION, SOLUTION INTRAVENOUS at 05:40

## 2025-02-09 RX ADMIN — FLUTICASONE PROPIONATE 1 SPRAY(S): 50 SPRAY, METERED NASAL at 18:55

## 2025-02-09 RX ADMIN — OFLOXACIN 1 DROP(S): 3 SOLUTION OPHTHALMIC at 18:56

## 2025-02-09 RX ADMIN — OFLOXACIN 1 DROP(S): 3 SOLUTION OPHTHALMIC at 12:27

## 2025-02-09 RX ADMIN — DOXAZOSIN MESYLATE 8 MILLIGRAM(S): 8 TABLET ORAL at 22:04

## 2025-02-09 RX ADMIN — Medication 1 APPLICATION(S): at 18:57

## 2025-02-09 RX ADMIN — Medication 1 DROP(S): at 05:36

## 2025-02-09 RX ADMIN — Medication 150 MILLIGRAM(S): at 04:52

## 2025-02-09 RX ADMIN — BACLOFEN 5 MILLIGRAM(S): 10 INJECTION INTRATHECAL at 05:40

## 2025-02-09 RX ADMIN — Medication 150 MILLIGRAM(S): at 12:28

## 2025-02-09 NOTE — PROGRESS NOTE ADULT - ASSESSMENT
44M with unclear PMH (?stroke, MI) who was found down at work, intubated for airway protection and found to have acute parenchymal hemorrhage within nabil with mass effect (+ acute/subacute right cerebellar infarct) in setting of hypertensive emergency, transferred to [facility] for further neurosurgical care. Hospital course c/b poor neurologic recovery s/p trach-PEG, AUR s/p gabriel, ANIKA on CKD c/b hyperkalemia, HAP s/p amp-sulbactam (EOT 10/23), K aerogenes bacteremia  treated with 2 weeks of Cefepime per ID , On 11/15 he became septic and was transferred to NSICU due to increased o2 requirements and needed Vent support , Trach Cultures + for Stenotrophomonas which was treated with 7 days of Bactrim per ID , has been weaned of Vent since 11/23 and is now on 10lts at 40% o2 through Trach Collar. Stepped up to the NSICU on 12/15 for hypoxia and tachycardia. PE ruled out. On abx for 5 days. Unclear etiology. Now stepped down to 8Lach.    # Pontine hemorrhage  # Encephalopathy due to Intracranial Bleed   - Acute parenchymal hemorrhage within nabil with mass effect (+ acute/subacute right cerebellar infarct) in setting of hypertensive emergency.   - MRI brain also demonstrated acute/subacute R cerebellar stroke.   - No Neurosurgical Interventions were performed   - Secondary to IPH and cerebellar stroke - Trach and PEG Dependent    # Klebsiella UTI  - remains afebrile  - Urine cx 1/27 growing Klebsiella   - MRSA nares negative  - Completed 5 day course of Levaquin    # Seizures  - Continue Seizure precautions   - Continue Keppra and phenytoin - appreciate further recs from epilepsy    # Hypertension  - amlodipine 5mg daily , Propranolol  with holding parameters     # Chronic respiratory failure.   - s/p percutaneous trach by pulm on 10/14/24  - Currently on Vent Support   - Continue Chest PT    # Neurogenic dysphagia.   - Pt s/p PEG with surgery 10/21/24  - TF per nutrition  - aspiration precautions and elevation of HOB.    # Acute urinary retention.   - doxazosin 8mg  - monitor urine output    # Functional quadriplegia in setting of brainstem hemorrhage  - decub precautions  - care per nursing protocol     # CKD stage 3 , Baseline Creatinine 1.1     35 minutes spent on this encounter  including face to face with patient, review of chart, care coordination and documentation.  plan discussed with neurosurgery team.

## 2025-02-09 NOTE — PROGRESS NOTE ADULT - SUBJECTIVE AND OBJECTIVE BOX
OVERNIGHT EVENTS:  NAEO per nursing staff , no events of Telemetry     SUBJECTIVE : patient does not talk , he was awake and resting in bed     PHYSICAL EXAM:  General: NAD, on vent through trach, breathing is nonlabored  HEENT: trach collar in place  Lungs: clear to auscultation anteriorly  Heart: RRR, normal s1s2  Abdomen: soft, no distension, + BS, PEG tube in place, site is clean and dry   Extremities:  warm, wwp     VITAL SIGNS:  Vital Signs Last 24 Hrs  T(C): 36.9 (09 Feb 2025 09:21), Max: 37.4 (09 Feb 2025 04:50)  T(F): 98.4 (09 Feb 2025 09:21), Max: 99.4 (09 Feb 2025 04:50)  HR: 74 (09 Feb 2025 08:39) (70 - 79)  BP: 115/79 (09 Feb 2025 08:30) (108/67 - 123/83)  BP(mean): 93 (09 Feb 2025 08:30) (82 - 98)  RR: 14 (09 Feb 2025 08:39) (14 - 17)  SpO2: 97% (09 Feb 2025 08:39) (97% - 100%)    Parameters below as of 09 Feb 2025 08:39  Patient On (Oxygen Delivery Method): ventilator    O2 Concentration (%): 40    MEDICATIONS  (STANDING):  acetylcysteine 10%  Inhalation 4 milliLiter(s) Inhalation every 6 hours  albuterol/ipratropium for Nebulization 3 milliLiter(s) Nebulizer every 6 hours  amLODIPine   Tablet 5 milliGRAM(s) Oral daily  artificial tears (preservative free) Ophthalmic Solution 1 Drop(s) Right EYE every 4 hours  baclofen 5 milliGRAM(s) Oral every 12 hours  chlorhexidine 0.12% Liquid 15 milliLiter(s) Oral Mucosa every 12 hours  chlorhexidine 2% Cloths 1 Application(s) Topical <User Schedule>  doxazosin 8 milliGRAM(s) Oral at bedtime  erythromycin   Ointment 1 Application(s) Both EYES at bedtime  fluticasone propionate 50 MICROgram(s)/spray Nasal Spray 1 Spray(s) Both Nostrils two times a day  heparin   Injectable 5000 Unit(s) SubCutaneous every 8 hours  influenza   Vaccine 0.5 milliLiter(s) IntraMuscular once  levETIRAcetam 1500 milliGRAM(s) Oral every 12 hours  ofloxacin 0.3% Solution 1 Drop(s) Right EYE four times a day  pantoprazole   Suspension 40 milliGRAM(s) Oral daily  petrolatum Ophthalmic Ointment 1 Application(s) Both EYES two times a day  phenytoin   Suspension 150 milliGRAM(s) Oral <User Schedule>  phenytoin   Suspension 200 milliGRAM(s) Oral <User Schedule>  polyethylene glycol 3350 17 Gram(s) Oral daily  propranolol 5 milliGRAM(s) Oral every 12 hours  senna 2 Tablet(s) Oral at bedtime  sodium chloride 0.65% Nasal 1 Spray(s) Both Nostrils two times a day    MEDICATIONS  (PRN):  acetaminophen   Oral Liquid .. 650 milliGRAM(s) Oral every 6 hours PRN Temp greater or equal to 38C (100.4F), Mild Pain (1 - 3)  LORazepam   Injectable 2 milliGRAM(s) IV Push once PRN Seizure Activity (NOTIFY PROVIDER PRIOR TO GIVING)        LABS:                                   10.6   8.46  )-----------( 224      ( 09 Feb 2025 05:30 )             32.7     02-09    137  |  100  |  48[H]  ----------------------------<  110[H]  5.0   |  26  |  1.15    Ca    9.0      09 Feb 2025 05:30  Phos  3.9     02-09  Mg     2.2     02-09                  CAPILLARY BLOOD GLUCOSE          RADIOLOGY & ADDITIONAL TESTS: Reviewed.

## 2025-02-09 NOTE — PROGRESS NOTE ADULT - SUBJECTIVE AND OBJECTIVE BOX
HPI:  Unknown age male, unknown past medical history (reported stroke and MI by coworkers) presented to [facility] with AMS, Pt was working at [employer] when he was found down by coworkers. EMS called and pt brought to [facility] ED. Intubated, sedated, started on cardene for SBPs in 200s. CT head showed brain stem bleed. Transferred to NSICU for further management.  (30 Sep 2024 12:55)    HOSPITAL COURSE:  9/30: transferred from [facility]. A line placed. Versed dc'd. Cy Rader at bedside, states pt has family in [place], cannot confirm medications or PMH other than stroke and MI. 250cc bolus 3% given. LR switched to NS. hydralazine 25q8 started, 3% started, switched propofol to precedex   10/1: stability CTH done. Added labetalol, started TF. Palliative consulted. ethics consulted to determine surrogate. febrile 103, pan cx sent  10/2: BD 2, GEORGE overnight. TF resumed. Desatt'd to 80s, FiO2 inc. to 50. Fentanyl given, ABG, CXR ordered. Maxxed on precedex, started on propofol for DARIEN -4 - -5. Precedex dc'd. Duonebs, mucomyst, hypertonic added. 3% dc'd. Cardene dc'd. Start vanc/CTX. Increased labetalol 200q8. MRSA negative, dc'd vanc. ETT pulled back 2cm x 2, good positioning after confirmatory chest xray. Ethics attempting to establish HCP with family. Na 159, starting FW 250q6 for range 150-155.   10/3: BD3, GEORGE o/n, neuro stable. Na elevating, FW increased to 300q6. Dc'd bowel reg for diarrhea. vEEG started. SQH 5000q8 tonight.   10/4: BD 4, albumn bolus, incr. LR to 80 2/2 incr. in Cr, LR to 10 0cc/hr for uptrending Cr. Started 7% hypersal for 48hrs and SL atropine for inline/oral thick secretions. Dc'd CTX and started ancef for MSSA in the sputum. Nephrology consulted for CKD, f/u recs. SBP 170s, given hydralazine 10mg IVP.   10/5: BD5, o/n 10mg IVP hydralazine given for SBP 170s and started on hydralazine 25q8 via OGT. 10mg IV push labetalol for SBP > 160s. RT placed for diarrhea.   10/6: BD6, o/n FW increased to 350q4 per nephrology recs. IV tylenol for temp 100.6, SBp 160s presumed uncomfortable.   10/7: BD7, overnight pancultured for temp 101.8F.   10/8: BD8. GEORGE. Cr bumped. decreased LR to 75cc/hr. Adding simethicone ATC. incr hydralazine 50mgTID. Incr labetalol 300mgTID. Na 145, decreased FWF to 250q6. Start precedex. FENa consistent with intrinsic kidney injury. Pend repeat renal US. Retaining up to 1.3L, bladder scans q6, straight cath PRN  10/9: BD 9. GEORGE overnight. Neuro stable. abd xray for distention w non-specific gas pattern, OGT to LIWS for morning. duonebs/mucomyst to q8 for improving secretions. Changed tube feeds to Jevity 1.5 20cc/hr, low rate due to abdominal distention, nepro dense and more difficult to digest. Tolerating CPAP, confirmed by ABG.   10/10: BD 10. GEORGE overnight. Neuro stable. (+) gabriel for urinary retention on bladder scan. inc TF to goal rate of 40cc/hr. family leaning toward pursuing trach/PEG. 1/2 amp for FS 81.   10/11: BD 11. GEORGE overnight. Neuro stable. Trach/PEG consults placed.   10/12: BD 12. GEORGE overnight. Neuro stable. MRI brain complete.   10/13: BD 13. Increase flomax. Hold SQH after PM dose for trach tm. IVL.   10/14: BD 14. GEORGE overnight, remains on AC/VC. Gabriel placed for urinary retention. Dc'd free water.  S/p trach with pulm. NGT placed and CXR confirmed in good position.   10/15: BD 15, GEORGE ovn. resumed feeds. spiked 101, pan cx sent.   10/16: BD 16. GEORGE ovn. Lokelma 5mg for K+ 5.4. Started vanc q 24/zosyn for empiric PNA coverage, IVF to 100/hr. PEG held for fever.   10/17: BD 17,  ordered serum osm and urine osm for am. Started sinemet for neurostimulation. Increased cardura to 0.8. Started FW 100q4, dc'd IVF. MRSA negative, dc'd vanc. NGT replaced d/t coiling.   10/18: BD 18, GEORGE overnight, neuro stable. Amantadine added for neurostim. zosyn changed to unasyn for acinetobacter baumannii, failed TOV and required SC  10/19: BD 19, GEORGE ovn. cardura 2mg added for retention. labetalol decreased 200q8, hydralazine decreased 25q8. Gabriel replaced.   10/20: BD20, GEORGE overnight. NGT dislodged, replaced. PEG tomorrow w/ gen surg, FW increased to 150q4 and labetalol decreased to 100q8, lokelma given for hyperkalemia.   10/21: BD 21. POD0 PEG placement with Gen surg. decr labetolol to 50q8, incr. cardura to 0.4, started lokelma and phoslo, dc gabriel POD0 PEG placement with Gen surg.  10/22: BD 22. Plan to start TF today via PEG. dc labetalol, Following ophtho recs. Increased apnea settings - found to be in cheyne-moe respiration. CPAP 5/5.  10/23: BD 23. hydralazine d/c'd, trach collar trial today. Rectal tube placed at 6am.  10/24: BD24, o/n lokelma held due to diarrhea. Free water 100q6 resumed. dc'd tamsulosin, amantadine. Incr'd cardura to 8mg qhs. Dc'd FW. Switched jevity to nepro. gabriel placed for high urine output. Started SL atropine for oral secretions. Dc'd free water.  10/25: BD25, o/n decreased suctioning requirements to > q4hrs, GEORGE. Cr improving, cont phoslo, lokelma held at this time. Gabriel placed yest, cont. Tolerating trach collar. Given 500cc plasmalyte bolus for ANIKA. Dc'd sinemet.   10/26: BD26, o/n resumed lokelma 5mg daily and resumed 100cc free water q6hrs. Change in neuro status with new right pupillary dilation with anisocoria (right pupil 6mm fixed and left pupil 3mm briskly reactive). Given 23.4% NaCl bullet, taken for emergent CTH showing mostly resolved pontine hemorrhage, continued brainstem hypodensity likely edema d/t hemorrhage, no new hemorrhage or infarct, no herniation, mild increase in size of left lateral ventricle. Vitals remaine stable. Na goal > 140.   10/27: BD27, o/n GEORGE.Neuro stable. Pend stepdown with airway bed.   10/28: BD 28. GEORGE overnight. Neuro stable. Miralax ordered. Gabriel removed, pending TOV.  10/29: BD 29. GEORGE o/n. Given 2L NS over 8 hrs for increased BUN/Cr ratio. Gabriel placed for frequent straight cath.   10/30: BD 30.   10/31: BD 31. GEORGE overnight. Na 149, increased free water to 200q6. 1L NS for uptrending BUN.   11/1: BD 32. GEORGE overnight. Given 1L NS for dehydration. Na 146, increased FW to 250q6.   11/2: BD 33 GEORGE overnight, neuro stable, given 500cc bolus for net negative status and tachycardia   11/3: BD 34, GEORGE overnight, neuro stable. Patient remains tachycardic, EKG showing sinus tachycardia, given additional 500cc NS bolus. Febrile to 101.9F, pan cultured (without UA), CXR WNL, given tylenol.   11/4: BD 35, GEORGE overnight, neuro stable. Given 1L NS for tachycardia. sputum (+) for stenotropohomas maltophilia.   11/5: BD 36 GEORGE overnight, neuro stable. Vancomycin dc'd. Chest PT BID. ID consulted, cont zosyn.  11/6: BD 37. blood cx + klebsiella dc zosyn changed to cefepime, CTAP ordered, rpt blood cx sent.    11/7: BD 38. Pending CT A/P, given 250cc bolus and starting maintenance fluids overnight. Pending CT A/P after bolus   11/8: BD 39. CT CAP negative for infection.   11/9: BD 40. GEORGE overnight.  11/10: BD 41. GEORGE overnight. desat to 85 on trach collar, O2 inc to 10L and 100%, O2 sat inc to 95. pt tachy to 110s, euvolemic. given tylenol. ABG and CXR ordered. spiked fever, pancultured, RVP negative. AM ABG w pO2 79, rpt w pO2 79. pt appears comfortable, satting 94%.   11/11: BD 42. GEORGE overnight. pt became tachy to 130s, desat to 90 on 100% FiO2 and 10L. suctioned, (+) productive cough. temp 101.4, given 1g IV tylenol and 500cc NS bolus for euvolemia. fever and HR downtrending. LE dopplers negative for dvt  11/12: BD 43, GEORGE ovn, fever and HR downtrending, satting 97% 70% FIO2  11/13: BD 44, GEORGE ovn. started standing tylenol x24 hours for tachycardia. desat to 80s, o2 increased. CXR stable, pending CTA PE protocol.   11/14: BD 45, GEORGE overnight, neuro stable. resp therapy dec FiO2 to 70%.   11/15: BD 46, GEORGE overnight, neuro stable.  Rapid called for desaturation 30s, tachycardic 140s. Patient bagged, 100% fio2, heavily suctioned. CXR/POCUS unremarkable. ABG c/w desaturation. WBC 14.71. Afebrile. O2 improved to 90s and patient upgraded to ICU. ABG paO2 30s improved to 89 on vent. IV Tylenol x 1, sputum sent. Start protonix while o-n vent.   11/16: BD 47. POCUS showed collapsable IVCF, given 1L bolus. Vanco/Cefpime added empriic for PNA, NGT feeds restarted. MRSA swab neg, Vanc DC'd.   11/17: BD 48. GEORGE overnight. 1l bolus for tachycardia. Spiked to 101, cultured. 500cc bolus for tachycardia, tachy to 148 given 25mcg fentanyl, 250cc albumin, 1.5L bolus. 5 IV lopressor with response HR to 100s. +Stenotrophomonas on sputum cx.   11/18: BD 49. GEORGE overnight. Consulted ID, cefepime switched to bactrim x7days. Started hydrochlorothiazine 12.5mg daily.  11/19: BD 50. Tachy 120s, given tylenol and 500cc NS. Tolerating 5/5, switched to TCx. Holding phos binder. D/c Bactrim. D/c gabriel, f/u TOV. Dc'd PPI.   11/20: BD 51. GEORGE ovn. 1600 satting low 90s, mildly tachy to 110s, afebrile, RR wnl. O2 improved to mid 90s while inc O2 to 100% on TCx. CPAP 5/5 placed back on.  11/21: BD 52, GEORGE ovn, tolerating CPAP 5/5. Switch to trach collar during the day if tolerating well. HCTZ held for Cr bump, straight cath frequence increased to q4  11/22: BD 53, GEORGE ovn. Resumed phoslo. Gabriel placed. Resumed HCTZ.   11/23: BD 54. Holding tylenol in setting of possible fever, will require pan cx if febrile. Cr improved today. Cont CPAP. Bowel regimen held i/s/o diarrhea. FOBT negative.  11/24: BD 55. GEORGE overnight. Neuro stable. HCTZ dc'ed, started lisinopril 5. Lokelma dc'ed for K 3.7.   11/25: BD 56, GEORGE overnight. Neuro stable. dc'd lisinopril 5mg. Gabriel dc'd. TOV. 1545 noted to be hypotensive, MAP 50, in supine position on chair, HR 60s, afebrile, O2 96%. Given 1L cc NS bolus, placed back on bed in reverse trendelenberg, improved to map of 66. Neostick at bedside. Vitals check q1h. Dc'ed amlodipine. Failed TOV, bladder scan q6, sc prn. Added back Senna.   11/26: BD 57, GEORGE overnight. Neuro stable. Dc'd phoslo.   11/27: BD 58, GEORGE overnight. Neuro stable.    11/28: BD 59. Gabriel replaced.   11/29: GEORGE.  11/30: GEORGE, neuro stable.   12/1: BD 62, GEORGE overnight  12/2: BD 63, GEORGE overnight.; Given 1L bolus of LR for uptrending BUN/Cr.  12/3: BD 64. Reinstated eye gtt/moisture chamber given increased Rt eye injection  12/4: BD 65. GEORGE overnight. Attempted to speak with ophtho regarding eyelid weight/closure but no answer, full mailbox.   12/5: BD 66, GEORGE overnight, bowel regimen increased and had BM.   12/6: BD 67, GEORGE overnight, neuro stable.  12/7: GEORGE overnight, neuro stable. /110s, given x1 hydralazine 10 mg IVP. Restarted home amlodipine 5mg.  12/8: GEORGE. OOB to chair.     12/11: GEORGE, mucomyst added for thick secretions, simethicone for abd distension, abd xray with stool burden, increased bowel regimen.   12/12: GEORGE overnight.   12/13: GEORGE overnight.   12/14: GEORGE overnight  12/15: o/n Patient became tachycardic HR 120s and 10 minutes later O2 sat dropped as low as 89%. Patient suctioned without improvement in O2 sat and tube feeds found in suction catheter. TFs held.  STAT CXR ordered. STAT labs sent. Respiratory therapist called to bedside and patient trach connected to ventilator. After connection to ventilator and further suctioning O2 sat improved to 97% but patient HR remains 120-130s. Upgraded to NSICU for further management. Vancomycin and zosyn started. CTA  chest PE protocol and CTH ordered. Blood cultures sent.given 500cc bolus, rpt ABG sent pO2 243, CTH and CTA chest done. FS while NPO, FiO2 dec 50 pending ABG. sputum cx positive for few GPC and GNR.   12/16: GEORGE overnight, restarted amlodipine, troponin 75   12/17: GEORGE overnight, neuro stable. Attempted CPAP this morning, did not tolerate and back on full vent support. Dc'd Vanc. Tachycardia to 140s, noted extremities to be twitching along with jaw twitching. Given 2g of Keppra, total 4mg ativan and placed on EEG, full set of labs and lactate negative. Resumed trickle feeds at 20cc/hr. Given 250cc albumin for tachycardia.   12/18: GEORGE overnight. Neuro stable. CTH stable. EEG negative and dc'd.   12/19: GEORGE overnight. neuro stable. SIMV most of day, AC/VC at night.   12/20: GEORGE overnight, neuro stable. remains on VC/AC  12/21: GEORGE ovn. 1L bolus for tachy to 120s-130s.   12/22: GEORGE ovn. Tachy to 120s, given tylenol and IVF. 2mg IV ativan given for L jaw twitching. Sx resolved for 3 minutes and started again. Epilepsy contacted. Given 2mg IV ativan. Continued twitching. Increased keppra to 1500mg BID, 2mg ativan given. Propranolol started for refractory tachycardia. vEEG ordered. albumin given. POCUS performed, no b lines. Started on fosphenytoin, loaded w 20mg/kg then 100mg TID. febrile, pancx, started cefepime 2g q8, vancomycin  12/23: GEORGE ovn. +focal motor seizures on eeg. ID consulted. Vancomycin dc'ed as per ID.  12/24: GEORGE ovn, neuro stable. Baclofen 5 mg q12 started for hiccups. Na 129 from 134. Urine lytes c/w SIADH. Given 250cc 3% bolus. CT chest for infection w/u - f/u read. Repeat Na 136. UA negative  12/25: GEORGE ovn.   12/26: GEORGE ovn  12/27: GEORGE overnight. Blood cx neg @ 4 days, DC cefepime per medicine (sputum colonized).   12/28: Desaturation o/n to 80's, CXR obtained, pulse ox changed and sats resolved. Na 133 from 138, 250cc 3% bolus given. Cefepime resumed until 12/30 per ID. Rpt Na 138.  12/29: GEORGE overnight. Na stable.   12/30: GEORGE overnight. Family meeting with son, pt now DNR.   12/31: GEORGE overnight.   1/1: GEORGE overnight, neuro stable.  1/2: GEORGE overnight, neuro stable. FW decreased to 100q6.   1/3: GEORGE overnight, neuro stable. fosphenytoin IV changed to pheytoin PO via PEG per epilepsy recommendations  1/4: GEORGE overnight, neuro stable. FW incr. to 100q4  1/5: GEORGE overnight, neuro stable.   1/6: GEORGE overnight, neuro stable   1/7: GEORGE overnight, neuro stable. BUN/Cr increased, increased FW to 200q6. Given 1L bolus of LR over 2 hours. Gabriel d/c'd, voiding, continue bladder scan q6.   1/8: GEORGE overnight, neuro stable. BUN/Cr improving.  1/9: GEORGE overnight, neuro stable.   1/10: GEORGE overnight, neuro stable.   1/11: GEORGE overnight, neuro stable, vent settings stable.   1/12: GEORGE overnight, neuro stable. Febrile to 102F, f/u pan cx, chest xray, given tylenol. Zosyn started.   1/13: GEORGE overnight, neuro stable, cont Zosyn for presumed PNA, prelim sputum cx growing; few GS neg diplococci, mod GS neg rods, rare GS + rods, fever trend improved. Free water increased to 204fho0.   1/14: GEORGE overnight. pending renal US for elevated Cr  1/15: GEORGE ovn. renal US complete.   1/16: GEORGE overnight, neuro stable   1/17: GEORGE overnight, neuro stable   1/18: GEORGE overnight, neuro stable.   1/19: GEORGE overnight, neuro stable. Propranolol dec to 5q8.   1/20: GEORGE overnight, neuro stable. Weaned propranolol to 5mg BID.   1/21: GEORGE ovn, in AM having rapid vertical eye movements with facial twitching, 2g total keppra given for AM dose and phenytoin level ordered, vital signs stable. CTH stable. Phenytoin increased to 100/100/150mg per epilepsy  1/22: GEORGE ovn. IV hydral x 1 for SBP 170s.   1/23: Cont to have focal motor seizures. Per epilepsy, changed phenytoin dose to 100/100/200.   1/24: Phenytoin lvl tmrw AM. Chest Xray completed due to temp 100.2F  1/25: GEORGE overnight. Incr dilantin morning and afternoon per epilepsy.   1/26: GEORGE overnight. Sustained focal twitching noticed by nursing. Ativan 8mg in total given. Fosphenytoin 1500mg IV given. Placed on EEG. Epilepsy following. TFs held. Phenytoin increased to 150/150/200.   1/27: o/n CTH completed due to continued lethargy after ativan given yesterday afternoon. TFs resumed. 500cc bolus NS given for SBP 90s. EEG dc'ed, discussed w/ Dr. Tomlin. Febrile 101F, pan cx sent. Likely left sided infiltrate on CXR, c/f aspiration PNA. Started on vanc/zosyn. MRSA swab pending.   1/28: Neuro stable, on vanc/zosyn. +UTI on UA, MRSA negative. Vanc dc'd. RVP negative. Zosyn increased to pseudomonal dosing.   1/29: GEORGE overnight, neuro stable.  1/30: GEORGE overnight, neuro stable. Dc'd zosyn due to resistance, switched to Levaquin.  1/31: GEORGE ovenight, neuro stable.   2/1: Brief desat. Improved with neb and reposititioning. ABG and POCUS wnl.   2/2-2/9: GEORGE overnight. Dispo pending     OVERNIGHT EVENTS:  Vital Signs Last 24 Hrs  T(C): 36.6 (08 Feb 2025 21:50), Max: 36.9 (08 Feb 2025 05:05)  T(F): 97.9 (08 Feb 2025 21:50), Max: 98.5 (08 Feb 2025 05:05)  HR: 76 (09 Feb 2025 00:12) (66 - 80)  BP: 123/83 (08 Feb 2025 20:10) (97/62 - 123/83)  BP(mean): 98 (08 Feb 2025 20:10) (75 - 100)  RR: 14 (08 Feb 2025 21:10) (14 - 18)  SpO2: 99% (09 Feb 2025 00:12) (93% - 100%)    Parameters below as of 09 Feb 2025 00:12  Patient On (Oxygen Delivery Method): ventilator    O2 Concentration (%): 40    I&O's Summary    07 Feb 2025 07:01  -  08 Feb 2025 07:00  --------------------------------------------------------  IN: 3328 mL / OUT: 1625 mL / NET: 1703 mL    08 Feb 2025 07:01  -  09 Feb 2025 00:26  --------------------------------------------------------  IN: 2124 mL / OUT: 1400 mL / NET: 724 mL    PHYSICAL EXAM:  General: patient seen laying supine in bed in NAD, Japanese speaking  Neuro: opens eyes spontaneously, A+Ox3, tracks vertically, RUE squeezes hand to command, RLE wiggles toes to command, LUE 0/5, b/l lower extremities withdraw to noxious   HEENT: PERRL, vertical EOMI  Neck: supple  Cardiac: RRR, S1S2  Pulmonary: chest rise symmetric, nonlabored breathing, +trach  Abdomen: soft, nontender, nondistended, +PEG  Ext: perfusing well  Skin: warm, dry    DIET:  [] NPO  [] Mechanical  [X] Tube feeds    LABS:                        10.6   6.97  )-----------( 232      ( 07 Feb 2025 07:14 )             31.7     02-07    138  |  99  |  52[H]  ----------------------------<  119[H]  4.4   |  24  |  1.19    Ca    9.0      07 Feb 2025 07:14  Phos  4.3     02-07  Mg     2.3     02-07    TPro  6.9  /  Alb  3.5  /  TBili  0.2  /  DBili  x   /  AST  12  /  ALT  16  /  AlkPhos  172[H]  02-07      Urinalysis Basic - ( 07 Feb 2025 07:14 )    Color: x / Appearance: x / SG: x / pH: x  Gluc: 119 mg/dL / Ketone: x  / Bili: x / Urobili: x   Blood: x / Protein: x / Nitrite: x   Leuk Esterase: x / RBC: x / WBC x   Sq Epi: x / Non Sq Epi: x / Bacteria: x    Allergies    Allergy Status Unknown    Intolerances      MEDICATIONS:  Antibiotics:    Neuro:  acetaminophen   Oral Liquid .. 650 milliGRAM(s) Oral every 6 hours PRN  baclofen 5 milliGRAM(s) Oral every 12 hours  levETIRAcetam 1500 milliGRAM(s) Oral every 12 hours  LORazepam   Injectable 2 milliGRAM(s) IV Push once PRN  phenytoin   Suspension 150 milliGRAM(s) Oral <User Schedule>  phenytoin   Suspension 200 milliGRAM(s) Oral <User Schedule>    Anticoagulation:  heparin   Injectable 5000 Unit(s) SubCutaneous every 8 hours    OTHER:  acetylcysteine 10%  Inhalation 4 milliLiter(s) Inhalation every 6 hours  albuterol/ipratropium for Nebulization 3 milliLiter(s) Nebulizer every 6 hours  amLODIPine   Tablet 5 milliGRAM(s) Oral daily  artificial tears (preservative free) Ophthalmic Solution 1 Drop(s) Right EYE every 4 hours  chlorhexidine 0.12% Liquid 15 milliLiter(s) Oral Mucosa every 12 hours  chlorhexidine 2% Cloths 1 Application(s) Topical <User Schedule>  doxazosin 8 milliGRAM(s) Oral at bedtime  erythromycin   Ointment 1 Application(s) Both EYES at bedtime  fluticasone propionate 50 MICROgram(s)/spray Nasal Spray 1 Spray(s) Both Nostrils two times a day  influenza   Vaccine 0.5 milliLiter(s) IntraMuscular once  ofloxacin 0.3% Solution 1 Drop(s) Right EYE four times a day  pantoprazole   Suspension 40 milliGRAM(s) Oral daily  petrolatum Ophthalmic Ointment 1 Application(s) Both EYES two times a day  polyethylene glycol 3350 17 Gram(s) Oral daily  propranolol 5 milliGRAM(s) Oral every 12 hours  senna 2 Tablet(s) Oral at bedtime  sodium chloride 0.65% Nasal 1 Spray(s) Both Nostrils two times a day    CULTURES:  Culture Results:   No growth at 5 days (02-01 @ 19:45)  Culture Results:   >100,000 CFU/ml Klebsiella aerogenes (Previously Enterobacter) (01-27 @ 23:20)    ASSESSMENT:  44M PMH ?stroke/MI present to [facility] after collapsing at work. Decorticate posturing, vomiting, intubated for airway protection. 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 Transferred to [facility] for further management. s/p trach 10/14. s/p peg 10/21. Re-upgrade to ICU 2/2 desaturation event and suctioning requirements 11/15. Re-upgrade to NSICU 12/15 2/2 desaturation and tachycardia.    AMS    Intubate    Handoff    MEWS Score    Acute myocardial infarction    CVA (cerebral vascular accident)    Intracerebral hemorrhage of brain stem    Brainstem stroke    Brain stem stroke syndrome    Brain stem hemorrhage    Brain stem stroke syndrome    Percutaneous tracheal puncture    Hemorrhagic stroke    Brainstem stroke    Pontine hemorrhage    Brainstem stroke    Encephalopathy acute    Functional quadriplegia    Advanced care planning/counseling discussion    Encounter for palliative care    Pontine hemorrhage    Neurogenic dysphagia    Chronic respiratory failure    Acute kidney injury superimposed on CKD    Acute urinary retention    Hypertensive emergency    Sepsis, unspecified organism    Sepsis    Gram-negative bacteremia    Dyspnea    Percutaneous tracheal puncture    Altered mental status examination    Neurogenic dysphagia    Chronic respiratory failure    Encounter for palliative care    Acute urinary retention    Hypertension    Seizures    [email or web address]    PLAN:  Neuro:  - neuro/vitals q4h  - pain control: tylenol prn  - seizure tx: keppra 1500mg BID, phenytoin 150/150/200  - s/p vEEG (1/26-1/27), (10/3-4), (10/17-10/19), (12/17-12/18), (12/22-12/25), (1/26-1/28),  +focal motor seizures not correlating w/ EEG  - CTH 9/30: enlarged pontine hemorrhage, CTH 10/3: stable, CTH 10/25: mostly resolved pontine hemorrhage, CTH 12/15: R mastoid air cell opacification; acute otitis media vs sterile effusion, CTH 12/18: stable. CTH 1/21 stable, CTH 1/27 stable  - MRI brain 10/12: parenchymal hemorrhage, acute/subacute R cerebellar stroke      CV:  - -160  - tachycardia: propranolol 5mg BID   - HTN: amlodipine 5mg  - echo (9/30) EF 75%, repeat 12/16: 57%    Resp:  - trach to vent, AC/VC 40/400/14/6  - Secretions: duonebs/mucomyst/chest PT q6h     GI:  - TFs via PEG  - bowel regimen, last BM 2/4  - baclofen 5q12 for hiccups    Renal:  - IVL  - FW 200cc q4h for hydration  - Voiding, SC prn  - CKD: trend BUN/Cr  - renal US 10/1, 10/8, 1/15: Increased bilateral renal echogenicity consistent with medical renal disease    Endo:  - A1c 5.4    Heme:  - DVT ppx: SCDs, SQH 5000u q8h   - LE dopplers negative 12/16    ID:  - last pan cx 1/27  - +klebsiella UTI s/p levaquin (1/30-2/3)  - empiric PNA: cefepime (12/22-12/30), s/p vanc (12/22-12/23), s/p zosyn (12/15-12/20), s/p vanc (12/15-12/16), s/p zosyn (1/12 -1/18)  - s/p Stenotrophomonas maltophilia PNA: s/p Bactrim (11/18-11/19) s/p Cefepime (11/16-11/18)  - 11/3, (+) sputum for stenotrophomas maltophlia, blood cx (+) klebsiella, cefepime 2gq12 (11/6 - 11/12)   - S/p Ancef (10/4-10/14) for PNA, and s/p Unasyn (10/18-10/23) +actinobacter baumanii     MISC:  - ophtho consult for keratitis  - erythromycin ointment b/l eye q4hrs, ofloxacin ointment R eye QID, artificial tears R eye q2hrs, moisture chamber at bedtime    Dispo: SDU status, DNR, pending PRUCOL for benefits     D/w Dr. D'Amico

## 2025-02-10 LAB
ANION GAP SERPL CALC-SCNC: 11 MMOL/L — SIGNIFICANT CHANGE UP (ref 5–17)
BUN SERPL-MCNC: 49 MG/DL — HIGH (ref 7–23)
CALCIUM SERPL-MCNC: 9.3 MG/DL — SIGNIFICANT CHANGE UP (ref 8.4–10.5)
CHLORIDE SERPL-SCNC: 97 MMOL/L — SIGNIFICANT CHANGE UP (ref 96–108)
CO2 SERPL-SCNC: 27 MMOL/L — SIGNIFICANT CHANGE UP (ref 22–31)
CREAT SERPL-MCNC: 1.17 MG/DL — SIGNIFICANT CHANGE UP (ref 0.5–1.3)
EGFR: 78 ML/MIN/1.73M2 — SIGNIFICANT CHANGE UP
EGFR: 78 ML/MIN/1.73M2 — SIGNIFICANT CHANGE UP
GLUCOSE SERPL-MCNC: 115 MG/DL — HIGH (ref 70–99)
HCT VFR BLD CALC: 35.2 % — LOW (ref 39–50)
HGB BLD-MCNC: 11.3 G/DL — LOW (ref 13–17)
MAGNESIUM SERPL-MCNC: 2.4 MG/DL — SIGNIFICANT CHANGE UP (ref 1.6–2.6)
MCHC RBC-ENTMCNC: 31.5 PG — SIGNIFICANT CHANGE UP (ref 27–34)
MCHC RBC-ENTMCNC: 32.1 G/DL — SIGNIFICANT CHANGE UP (ref 32–36)
MCV RBC AUTO: 98.1 FL — SIGNIFICANT CHANGE UP (ref 80–100)
NRBC # BLD: 0 /100 WBCS — SIGNIFICANT CHANGE UP (ref 0–0)
NRBC BLD-RTO: 0 /100 WBCS — SIGNIFICANT CHANGE UP (ref 0–0)
PHOSPHATE SERPL-MCNC: 4.3 MG/DL — SIGNIFICANT CHANGE UP (ref 2.5–4.5)
PLATELET # BLD AUTO: 205 K/UL — SIGNIFICANT CHANGE UP (ref 150–400)
POTASSIUM SERPL-MCNC: 5.3 MMOL/L — SIGNIFICANT CHANGE UP (ref 3.5–5.3)
POTASSIUM SERPL-SCNC: 5.3 MMOL/L — SIGNIFICANT CHANGE UP (ref 3.5–5.3)
RBC # BLD: 3.59 M/UL — LOW (ref 4.2–5.8)
RBC # FLD: 12.9 % — SIGNIFICANT CHANGE UP (ref 10.3–14.5)
SODIUM SERPL-SCNC: 135 MMOL/L — SIGNIFICANT CHANGE UP (ref 135–145)
WBC # BLD: 9.07 K/UL — SIGNIFICANT CHANGE UP (ref 3.8–10.5)
WBC # FLD AUTO: 9.07 K/UL — SIGNIFICANT CHANGE UP (ref 3.8–10.5)

## 2025-02-10 PROCEDURE — 99231 SBSQ HOSP IP/OBS SF/LOW 25: CPT

## 2025-02-10 PROCEDURE — 99233 SBSQ HOSP IP/OBS HIGH 50: CPT

## 2025-02-10 RX ADMIN — Medication 200 MILLIGRAM(S): at 18:23

## 2025-02-10 RX ADMIN — Medication 1 DROP(S): at 03:00

## 2025-02-10 RX ADMIN — Medication 1 DROP(S): at 21:36

## 2025-02-10 RX ADMIN — LEVETIRACETAM 1500 MILLIGRAM(S): 10 INJECTION, SOLUTION INTRAVENOUS at 18:22

## 2025-02-10 RX ADMIN — ACETYLCYSTEINE 4 MILLILITER(S): 200 INHALANT RESPIRATORY (INHALATION) at 06:12

## 2025-02-10 RX ADMIN — ACETYLCYSTEINE 4 MILLILITER(S): 200 INHALANT RESPIRATORY (INHALATION) at 23:41

## 2025-02-10 RX ADMIN — FLUTICASONE PROPIONATE 1 SPRAY(S): 50 SPRAY, METERED NASAL at 06:12

## 2025-02-10 RX ADMIN — Medication 40 MILLIGRAM(S): at 13:20

## 2025-02-10 RX ADMIN — Medication 15 MILLILITER(S): at 06:07

## 2025-02-10 RX ADMIN — Medication 1 SPRAY(S): at 18:23

## 2025-02-10 RX ADMIN — Medication 150 MILLIGRAM(S): at 07:21

## 2025-02-10 RX ADMIN — Medication 15 MILLILITER(S): at 18:23

## 2025-02-10 RX ADMIN — ACETYLCYSTEINE 4 MILLILITER(S): 200 INHALANT RESPIRATORY (INHALATION) at 18:24

## 2025-02-10 RX ADMIN — HEPARIN SODIUM 5000 UNIT(S): 1000 INJECTION INTRAVENOUS; SUBCUTANEOUS at 13:20

## 2025-02-10 RX ADMIN — OFLOXACIN 1 DROP(S): 3 SOLUTION OPHTHALMIC at 06:10

## 2025-02-10 RX ADMIN — OFLOXACIN 1 DROP(S): 3 SOLUTION OPHTHALMIC at 00:08

## 2025-02-10 RX ADMIN — BACLOFEN 5 MILLIGRAM(S): 10 INJECTION INTRATHECAL at 06:09

## 2025-02-10 RX ADMIN — Medication 650 MILLIGRAM(S): at 00:24

## 2025-02-10 RX ADMIN — Medication 2 TABLET(S): at 21:42

## 2025-02-10 RX ADMIN — Medication 1 DROP(S): at 13:25

## 2025-02-10 RX ADMIN — OFLOXACIN 1 DROP(S): 3 SOLUTION OPHTHALMIC at 23:42

## 2025-02-10 RX ADMIN — HEPARIN SODIUM 5000 UNIT(S): 1000 INJECTION INTRAVENOUS; SUBCUTANEOUS at 06:10

## 2025-02-10 RX ADMIN — IPRATROPIUM BROMIDE AND ALBUTEROL SULFATE 3 MILLILITER(S): .5; 2.5 SOLUTION RESPIRATORY (INHALATION) at 00:08

## 2025-02-10 RX ADMIN — Medication 1 APPLICATION(S): at 18:24

## 2025-02-10 RX ADMIN — OFLOXACIN 1 DROP(S): 3 SOLUTION OPHTHALMIC at 13:20

## 2025-02-10 RX ADMIN — OFLOXACIN 1 DROP(S): 3 SOLUTION OPHTHALMIC at 18:24

## 2025-02-10 RX ADMIN — IPRATROPIUM BROMIDE AND ALBUTEROL SULFATE 3 MILLILITER(S): .5; 2.5 SOLUTION RESPIRATORY (INHALATION) at 06:12

## 2025-02-10 RX ADMIN — IPRATROPIUM BROMIDE AND ALBUTEROL SULFATE 3 MILLILITER(S): .5; 2.5 SOLUTION RESPIRATORY (INHALATION) at 13:22

## 2025-02-10 RX ADMIN — Medication 150 MILLIGRAM(S): at 13:21

## 2025-02-10 RX ADMIN — Medication 1 DROP(S): at 09:01

## 2025-02-10 RX ADMIN — Medication 1 DROP(S): at 18:23

## 2025-02-10 RX ADMIN — ERYTHROMYCIN 1 APPLICATION(S): 5 OINTMENT OPHTHALMIC at 21:37

## 2025-02-10 RX ADMIN — IPRATROPIUM BROMIDE AND ALBUTEROL SULFATE 3 MILLILITER(S): .5; 2.5 SOLUTION RESPIRATORY (INHALATION) at 23:42

## 2025-02-10 RX ADMIN — Medication 1 SPRAY(S): at 06:13

## 2025-02-10 RX ADMIN — Medication 1 APPLICATION(S): at 06:43

## 2025-02-10 RX ADMIN — DOXAZOSIN MESYLATE 8 MILLIGRAM(S): 8 TABLET ORAL at 21:37

## 2025-02-10 RX ADMIN — Medication 1 APPLICATION(S): at 06:07

## 2025-02-10 RX ADMIN — ACETYLCYSTEINE 4 MILLILITER(S): 200 INHALANT RESPIRATORY (INHALATION) at 00:07

## 2025-02-10 RX ADMIN — Medication 1 DROP(S): at 06:09

## 2025-02-10 RX ADMIN — HEPARIN SODIUM 5000 UNIT(S): 1000 INJECTION INTRAVENOUS; SUBCUTANEOUS at 21:37

## 2025-02-10 RX ADMIN — IPRATROPIUM BROMIDE AND ALBUTEROL SULFATE 3 MILLILITER(S): .5; 2.5 SOLUTION RESPIRATORY (INHALATION) at 18:20

## 2025-02-10 RX ADMIN — LEVETIRACETAM 1500 MILLIGRAM(S): 10 INJECTION, SOLUTION INTRAVENOUS at 06:09

## 2025-02-10 RX ADMIN — Medication 650 MILLIGRAM(S): at 01:00

## 2025-02-10 RX ADMIN — FLUTICASONE PROPIONATE 1 SPRAY(S): 50 SPRAY, METERED NASAL at 18:23

## 2025-02-10 RX ADMIN — ACETYLCYSTEINE 4 MILLILITER(S): 200 INHALANT RESPIRATORY (INHALATION) at 13:24

## 2025-02-10 RX ADMIN — BACLOFEN 5 MILLIGRAM(S): 10 INJECTION INTRATHECAL at 18:21

## 2025-02-10 NOTE — PROGRESS NOTE ADULT - SUBJECTIVE AND OBJECTIVE BOX
HPI:  Unknown age male, unknown past medical history (reported stroke and MI by coworkers) presented to [facility] with AMS, Pt was working at [employer] when he was found down by coworkers. EMS called and pt brought to [facility] ED. Intubated, sedated, started on cardene for SBPs in 200s. CT head showed brain stem bleed. Transferred to NSICU for further management.  (30 Sep 2024 12:55)    HOSPITAL COURSE:  9/30: transferred from [facility]. A line placed. Versed dc'd. Cy Rader at bedside, states pt has family in [place], cannot confirm medications or PMH other than stroke and MI. 250cc bolus 3% given. LR switched to NS. hydralazine 25q8 started, 3% started, switched propofol to precedex   10/1: stability CTH done. Added labetalol, started TF. Palliative consulted. ethics consulted to determine surrogate. febrile 103, pan cx sent  10/2: BD 2, GEORGE overnight. TF resumed. Desatt'd to 80s, FiO2 inc. to 50. Fentanyl given, ABG, CXR ordered. Maxxed on precedex, started on propofol for DARIEN -4 - -5. Precedex dc'd. Duonebs, mucomyst, hypertonic added. 3% dc'd. Cardene dc'd. Start vanc/CTX. Increased labetalol 200q8. MRSA negative, dc'd vanc. ETT pulled back 2cm x 2, good positioning after confirmatory chest xray. Ethics attempting to establish HCP with family. Na 159, starting FW 250q6 for range 150-155.   10/3: BD3, GEORGE o/n, neuro stable. Na elevating, FW increased to 300q6. Dc'd bowel reg for diarrhea. vEEG started. SQH 5000q8 tonight.   10/4: BD 4, albumn bolus, incr. LR to 80 2/2 incr. in Cr, LR to 10 0cc/hr for uptrending Cr. Started 7% hypersal for 48hrs and SL atropine for inline/oral thick secretions. Dc'd CTX and started ancef for MSSA in the sputum. Nephrology consulted for CKD, f/u recs. SBP 170s, given hydralazine 10mg IVP.   10/5: BD5, o/n 10mg IVP hydralazine given for SBP 170s and started on hydralazine 25q8 via OGT. 10mg IV push labetalol for SBP > 160s. RT placed for diarrhea.   10/6: BD6, o/n FW increased to 350q4 per nephrology recs. IV tylenol for temp 100.6, SBp 160s presumed uncomfortable.   10/7: BD7, overnight pancultured for temp 101.8F.   10/8: BD8. GEORGE. Cr bumped. decreased LR to 75cc/hr. Adding simethicone ATC. incr hydralazine 50mgTID. Incr labetalol 300mgTID. Na 145, decreased FWF to 250q6. Start precedex. FENa consistent with intrinsic kidney injury. Pend repeat renal US. Retaining up to 1.3L, bladder scans q6, straight cath PRN  10/9: BD 9. GEORGE overnight. Neuro stable. abd xray for distention w non-specific gas pattern, OGT to LIWS for morning. duonebs/mucomyst to q8 for improving secretions. Changed tube feeds to Jevity 1.5 20cc/hr, low rate due to abdominal distention, nepro dense and more difficult to digest. Tolerating CPAP, confirmed by ABG.   10/10: BD 10. GEORGE overnight. Neuro stable. (+) gabriel for urinary retention on bladder scan. inc TF to goal rate of 40cc/hr. family leaning toward pursuing trach/PEG. 1/2 amp for FS 81.   10/11: BD 11. GEORGE overnight. Neuro stable. Trach/PEG consults placed.   10/12: BD 12. GEORGE overnight. Neuro stable. MRI brain complete.   10/13: BD 13. Increase flomax. Hold SQH after PM dose for trach tm. IVL.   10/14: BD 14. GEORGE overnight, remains on AC/VC. Gabriel placed for urinary retention. Dc'd free water.  S/p trach with pulm. NGT placed and CXR confirmed in good position.   10/15: BD 15, GEORGE ovn. resumed feeds. spiked 101, pan cx sent.   10/16: BD 16. GEORGE ovn. Lokelma 5mg for K+ 5.4. Started vanc q 24/zosyn for empiric PNA coverage, IVF to 100/hr. PEG held for fever.   10/17: BD 17,  ordered serum osm and urine osm for am. Started sinemet for neurostimulation. Increased cardura to 0.8. Started FW 100q4, dc'd IVF. MRSA negative, dc'd vanc. NGT replaced d/t coiling.   10/18: BD 18, GEORGE overnight, neuro stable. Amantadine added for neurostim. zosyn changed to unasyn for acinetobacter baumannii, failed TOV and required SC  10/19: BD 19, GEORGE ovn. cardura 2mg added for retention. labetalol decreased 200q8, hydralazine decreased 25q8. Gabriel replaced.   10/20: BD20, GEORGE overnight. NGT dislodged, replaced. PEG tomorrow w/ gen surg, FW increased to 150q4 and labetalol decreased to 100q8, lokelma given for hyperkalemia.   10/21: BD 21. POD0 PEG placement with Gen surg. decr labetolol to 50q8, incr. cardura to 0.4, started lokelma and phoslo, dc gabriel POD0 PEG placement with Gen surg.  10/22: BD 22. Plan to start TF today via PEG. dc labetalol, Following ophtho recs. Increased apnea settings - found to be in cheyne-moe respiration. CPAP 5/5.  10/23: BD 23. hydralazine d/c'd, trach collar trial today. Rectal tube placed at 6am.  10/24: BD24, o/n lokelma held due to diarrhea. Free water 100q6 resumed. dc'd tamsulosin, amantadine. Incr'd cardura to 8mg qhs. Dc'd FW. Switched jevity to nepro. gabriel placed for high urine output. Started SL atropine for oral secretions. Dc'd free water.  10/25: BD25, o/n decreased suctioning requirements to > q4hrs, GEORGE. Cr improving, cont phoslo, lokelma held at this time. Gabriel placed yest, cont. Tolerating trach collar. Given 500cc plasmalyte bolus for ANIKA. Dc'd sinemet.   10/26: BD26, o/n resumed lokelma 5mg daily and resumed 100cc free water q6hrs. Change in neuro status with new right pupillary dilation with anisocoria (right pupil 6mm fixed and left pupil 3mm briskly reactive). Given 23.4% NaCl bullet, taken for emergent CTH showing mostly resolved pontine hemorrhage, continued brainstem hypodensity likely edema d/t hemorrhage, no new hemorrhage or infarct, no herniation, mild increase in size of left lateral ventricle. Vitals remaine stable. Na goal > 140.   10/27: BD27, o/n GEORGE.Neuro stable. Pend stepdown with airway bed.   10/28: BD 28. GEORGE overnight. Neuro stable. Miralax ordered. Gabriel removed, pending TOV.  10/29: BD 29. GEORGE o/n. Given 2L NS over 8 hrs for increased BUN/Cr ratio. Gabriel placed for frequent straight cath.   10/30: BD 30.   10/31: BD 31. GEORGE overnight. Na 149, increased free water to 200q6. 1L NS for uptrending BUN.   11/1: BD 32. GEORGE overnight. Given 1L NS for dehydration. Na 146, increased FW to 250q6.   11/2: BD 33 GEORGE overnight, neuro stable, given 500cc bolus for net negative status and tachycardia   11/3: BD 34, GEORGE overnight, neuro stable. Patient remains tachycardic, EKG showing sinus tachycardia, given additional 500cc NS bolus. Febrile to 101.9F, pan cultured (without UA), CXR WNL, given tylenol.   11/4: BD 35, GEORGE overnight, neuro stable. Given 1L NS for tachycardia. sputum (+) for stenotropohomas maltophilia.   11/5: BD 36 GEORGE overnight, neuro stable. Vancomycin dc'd. Chest PT BID. ID consulted, cont zosyn.  11/6: BD 37. blood cx + klebsiella dc zosyn changed to cefepime, CTAP ordered, rpt blood cx sent.    11/7: BD 38. Pending CT A/P, given 250cc bolus and starting maintenance fluids overnight. Pending CT A/P after bolus   11/8: BD 39. CT CAP negative for infection.   11/9: BD 40. GEORGE overnight.  11/10: BD 41. GEORGE overnight. desat to 85 on trach collar, O2 inc to 10L and 100%, O2 sat inc to 95. pt tachy to 110s, euvolemic. given tylenol. ABG and CXR ordered. spiked fever, pancultured, RVP negative. AM ABG w pO2 79, rpt w pO2 79. pt appears comfortable, satting 94%.   11/11: BD 42. GEORGE overnight. pt became tachy to 130s, desat to 90 on 100% FiO2 and 10L. suctioned, (+) productive cough. temp 101.4, given 1g IV tylenol and 500cc NS bolus for euvolemia. fever and HR downtrending. LE dopplers negative for dvt  11/12: BD 43, GEORGE ovn, fever and HR downtrending, satting 97% 70% FIO2  11/13: BD 44, GEORGE ovn. started standing tylenol x24 hours for tachycardia. desat to 80s, o2 increased. CXR stable, pending CTA PE protocol.   11/14: BD 45, GEORGE overnight, neuro stable. resp therapy dec FiO2 to 70%.   11/15: BD 46, GEORGE overnight, neuro stable.  Rapid called for desaturation 30s, tachycardic 140s. Patient bagged, 100% fio2, heavily suctioned. CXR/POCUS unremarkable. ABG c/w desaturation. WBC 14.71. Afebrile. O2 improved to 90s and patient upgraded to ICU. ABG paO2 30s improved to 89 on vent. IV Tylenol x 1, sputum sent. Start protonix while o-n vent.   11/16: BD 47. POCUS showed collapsable IVCF, given 1L bolus. Vanco/Cefpime added empriic for PNA, NGT feeds restarted. MRSA swab neg, Vanc DC'd.   11/17: BD 48. GEORGE overnight. 1l bolus for tachycardia. Spiked to 101, cultured. 500cc bolus for tachycardia, tachy to 148 given 25mcg fentanyl, 250cc albumin, 1.5L bolus. 5 IV lopressor with response HR to 100s. +Stenotrophomonas on sputum cx.   11/18: BD 49. GEORGE overnight. Consulted ID, cefepime switched to bactrim x7days. Started hydrochlorothiazine 12.5mg daily.  11/19: BD 50. Tachy 120s, given tylenol and 500cc NS. Tolerating 5/5, switched to TCx. Holding phos binder. D/c Bactrim. D/c gabriel, f/u TOV. Dc'd PPI.   11/20: BD 51. GEORGE ovn. 1600 satting low 90s, mildly tachy to 110s, afebrile, RR wnl. O2 improved to mid 90s while inc O2 to 100% on TCx. CPAP 5/5 placed back on.  11/21: BD 52, GEORGE ovn, tolerating CPAP 5/5. Switch to trach collar during the day if tolerating well. HCTZ held for Cr bump, straight cath frequence increased to q4  11/22: BD 53, GEORGE ovn. Resumed phoslo. Gabriel placed. Resumed HCTZ.   11/23: BD 54. Holding tylenol in setting of possible fever, will require pan cx if febrile. Cr improved today. Cont CPAP. Bowel regimen held i/s/o diarrhea. FOBT negative.  11/24: BD 55. GEORGE overnight. Neuro stable. HCTZ dc'ed, started lisinopril 5. Lokelma dc'ed for K 3.7.   11/25: BD 56, GEORGE overnight. Neuro stable. dc'd lisinopril 5mg. Gabriel dc'd. TOV. 1545 noted to be hypotensive, MAP 50, in supine position on chair, HR 60s, afebrile, O2 96%. Given 1L cc NS bolus, placed back on bed in reverse trendelenberg, improved to map of 66. Neostick at bedside. Vitals check q1h. Dc'ed amlodipine. Failed TOV, bladder scan q6, sc prn. Added back Senna.   11/26: BD 57, GEORGE overnight. Neuro stable. Dc'd phoslo.   11/27: BD 58, GEORGE overnight. Neuro stable.    11/28: BD 59. Gabriel replaced.   11/29: GEORGE.  11/30: GEORGE, neuro stable.   12/1: BD 62, GEORGE overnight  12/2: BD 63, GEORGE overnight.; Given 1L bolus of LR for uptrending BUN/Cr.  12/3: BD 64. Reinstated eye gtt/moisture chamber given increased Rt eye injection  12/4: BD 65. GEORGE overnight. Attempted to speak with ophtho regarding eyelid weight/closure but no answer, full mailbox.   12/5: BD 66, GEORGE overnight, bowel regimen increased and had BM.   12/6: BD 67, GEORGE overnight, neuro stable.  12/7: GEORGE overnight, neuro stable. /110s, given x1 hydralazine 10 mg IVP. Restarted home amlodipine 5mg.  12/8: GEORGE. OOB to chair.     12/11: GEORGE, mucomyst added for thick secretions, simethicone for abd distension, abd xray with stool burden, increased bowel regimen.   12/12: GEORGE overnight.   12/13: GEORGE overnight.   12/14: GEORGE overnight  12/15: o/n Patient became tachycardic HR 120s and 10 minutes later O2 sat dropped as low as 89%. Patient suctioned without improvement in O2 sat and tube feeds found in suction catheter. TFs held.  STAT CXR ordered. STAT labs sent. Respiratory therapist called to bedside and patient trach connected to ventilator. After connection to ventilator and further suctioning O2 sat improved to 97% but patient HR remains 120-130s. Upgraded to NSICU for further management. Vancomycin and zosyn started. CTA  chest PE protocol and CTH ordered. Blood cultures sent.given 500cc bolus, rpt ABG sent pO2 243, CTH and CTA chest done. FS while NPO, FiO2 dec 50 pending ABG. sputum cx positive for few GPC and GNR.   12/16: GEORGE overnight, restarted amlodipine, troponin 75   12/17: GEORGE overnight, neuro stable. Attempted CPAP this morning, did not tolerate and back on full vent support. Dc'd Vanc. Tachycardia to 140s, noted extremities to be twitching along with jaw twitching. Given 2g of Keppra, total 4mg ativan and placed on EEG, full set of labs and lactate negative. Resumed trickle feeds at 20cc/hr. Given 250cc albumin for tachycardia.   12/18: GEORGE overnight. Neuro stable. CTH stable. EEG negative and dc'd.   12/19: GEORGE overnight. neuro stable. SIMV most of day, AC/VC at night.   12/20: GEORGE overnight, neuro stable. remains on VC/AC  12/21: GEORGE ovn. 1L bolus for tachy to 120s-130s.   12/22: GEORGE ovn. Tachy to 120s, given tylenol and IVF. 2mg IV ativan given for L jaw twitching. Sx resolved for 3 minutes and started again. Epilepsy contacted. Given 2mg IV ativan. Continued twitching. Increased keppra to 1500mg BID, 2mg ativan given. Propranolol started for refractory tachycardia. vEEG ordered. albumin given. POCUS performed, no b lines. Started on fosphenytoin, loaded w 20mg/kg then 100mg TID. febrile, pancx, started cefepime 2g q8, vancomycin  12/23: GEORGE ovn. +focal motor seizures on eeg. ID consulted. Vancomycin dc'ed as per ID.  12/24: GEORGE ovn, neuro stable. Baclofen 5 mg q12 started for hiccups. Na 129 from 134. Urine lytes c/w SIADH. Given 250cc 3% bolus. CT chest for infection w/u - f/u read. Repeat Na 136. UA negative  12/25: GEORGE ovn.   12/26: GEORGE ovn  12/27: GEORGE overnight. Blood cx neg @ 4 days, DC cefepime per medicine (sputum colonized).   12/28: Desaturation o/n to 80's, CXR obtained, pulse ox changed and sats resolved. Na 133 from 138, 250cc 3% bolus given. Cefepime resumed until 12/30 per ID. Rpt Na 138.  12/29: GEORGE overnight. Na stable.   12/30: GEORGE overnight. Family meeting with son, pt now DNR.   12/31: GEORGE overnight.   1/1: GEORGE overnight, neuro stable.  1/2: GEORGE overnight, neuro stable. FW decreased to 100q6.   1/3: GEORGE overnight, neuro stable. fosphenytoin IV changed to pheytoin PO via PEG per epilepsy recommendations  1/4: GEORGE overnight, neuro stable. FW incr. to 100q4  1/5: GEORGE overnight, neuro stable.   1/6: GEORGE overnight, neuro stable   1/7: GEORGE overnight, neuro stable. BUN/Cr increased, increased FW to 200q6. Given 1L bolus of LR over 2 hours. Gabriel d/c'd, voiding, continue bladder scan q6.   1/8: GEORGE overnight, neuro stable. BUN/Cr improving.  1/9: GEORGE overnight, neuro stable.   1/10: GEORGE overnight, neuro stable.   1/11: GEORGE overnight, neuro stable, vent settings stable.   1/12: GEORGE overnight, neuro stable. Febrile to 102F, f/u pan cx, chest xray, given tylenol. Zosyn started.   1/13: GEORGE overnight, neuro stable, cont Zosyn for presumed PNA, prelim sputum cx growing; few GS neg diplococci, mod GS neg rods, rare GS + rods, fever trend improved. Free water increased to 814mqf2.   1/14: GEORGE overnight. pending renal US for elevated Cr  1/15: GEORGE ovn. renal US complete.   1/16: GEORGE overnight, neuro stable   1/17: GEORGE overnight, neuro stable   1/18: GEORGE overnight, neuro stable.   1/19: GEORGE overnight, neuro stable. Propranolol dec to 5q8.   1/20: GEORGE overnight, neuro stable. Weaned propranolol to 5mg BID.   1/21: GEORGE ovn, in AM having rapid vertical eye movements with facial twitching, 2g total keppra given for AM dose and phenytoin level ordered, vital signs stable. CTH stable. Phenytoin increased to 100/100/150mg per epilepsy  1/22: GEORGE ovn. IV hydral x 1 for SBP 170s.   1/23: Cont to have focal motor seizures. Per epilepsy, changed phenytoin dose to 100/100/200.   1/24: Phenytoin lvl tmrw AM. Chest Xray completed due to temp 100.2F  1/25: GEORGE overnight. Incr dilantin morning and afternoon per epilepsy.   1/26: GEORGE overnight. Sustained focal twitching noticed by nursing. Ativan 8mg in total given. Fosphenytoin 1500mg IV given. Placed on EEG. Epilepsy following. TFs held. Phenytoin increased to 150/150/200.   1/27: o/n CTH completed due to continued lethargy after ativan given yesterday afternoon. TFs resumed. 500cc bolus NS given for SBP 90s. EEG dc'ed, discussed w/ Dr. Tomlin. Febrile 101F, pan cx sent. Likely left sided infiltrate on CXR, c/f aspiration PNA. Started on vanc/zosyn. MRSA swab pending.   1/28: Neuro stable, on vanc/zosyn. +UTI on UA, MRSA negative. Vanc dc'd. RVP negative. Zosyn increased to pseudomonal dosing.   1/29-2/10: GEORGE. Bun/Cr returned to baseline. Afebrile     OVERNIGHT EVENTS: GEORGE overnight, neuro stable     Vital Signs Last 24 Hrs  T(C): 37 (09 Feb 2025 21:55), Max: 37.4 (09 Feb 2025 04:50)  T(F): 98.6 (09 Feb 2025 21:55), Max: 99.4 (09 Feb 2025 04:50)  HR: 76 (09 Feb 2025 21:00) (70 - 90)  BP: 114/76 (09 Feb 2025 21:00) (108/67 - 115/79)  BP(mean): 90 (09 Feb 2025 21:00) (82 - 93)  RR: 18 (09 Feb 2025 21:00) (14 - 18)  SpO2: 100% (09 Feb 2025 21:00) (97% - 100%)    Parameters below as of 09 Feb 2025 21:00  Patient On (Oxygen Delivery Method): ventilator    O2 Concentration (%): 40    I&O's Summary    08 Feb 2025 07:01  -  09 Feb 2025 07:00  --------------------------------------------------------  IN: 2824 mL / OUT: 1900 mL / NET: 924 mL    09 Feb 2025 07:01  -  10 Feb 2025 00:03  --------------------------------------------------------  IN: 1524 mL / OUT: 1200 mL / NET: 324 mL    PHYSICAL EXAM:  General: patient seen laying supine in bed in NAD, Serbian speaking  Neuro: opens eyes spontaneously, A+Ox3, tracks vertically, RUE squeezes hand to command, RLE wiggles toes to command, LUE 0/5, b/l lower extremities withdraw to noxious   HEENT: PERRL, vertical EOMI  Neck: supple  Cardiac: RRR, S1S2  Pulmonary: chest rise symmetric, nonlabored breathing, +trach  Abdomen: soft, nontender, nondistended, +PEG  Ext: perfusing well  Skin: warm, dry    DIET:  [] NPO  [] Mechanical  [X] Tube feeds    LABS:                        10.6   8.46  )-----------( 224      ( 09 Feb 2025 05:30 )             32.7     02-09    137  |  100  |  48[H]  ----------------------------<  110[H]  5.0   |  26  |  1.15    Ca    9.0      09 Feb 2025 05:30  Phos  3.9     02-09  Mg     2.2     02-09        Urinalysis Basic - ( 09 Feb 2025 05:30 )    Color: x / Appearance: x / SG: x / pH: x  Gluc: 110 mg/dL / Ketone: x  / Bili: x / Urobili: x   Blood: x / Protein: x / Nitrite: x   Leuk Esterase: x / RBC: x / WBC x   Sq Epi: x / Non Sq Epi: x / Bacteria: x    Allergies    Allergy Status Unknown    Intolerances      MEDICATIONS:  Antibiotics:    Neuro:  acetaminophen   Oral Liquid .. 650 milliGRAM(s) Oral every 6 hours PRN  baclofen 5 milliGRAM(s) Oral every 12 hours  levETIRAcetam 1500 milliGRAM(s) Oral every 12 hours  LORazepam   Injectable 2 milliGRAM(s) IV Push once PRN  phenytoin   Suspension 150 milliGRAM(s) Oral <User Schedule>  phenytoin   Suspension 200 milliGRAM(s) Oral <User Schedule>    Anticoagulation:  heparin   Injectable 5000 Unit(s) SubCutaneous every 8 hours    OTHER:  acetylcysteine 10%  Inhalation 4 milliLiter(s) Inhalation every 6 hours  albuterol/ipratropium for Nebulization 3 milliLiter(s) Nebulizer every 6 hours  amLODIPine   Tablet 5 milliGRAM(s) Oral daily  artificial tears (preservative free) Ophthalmic Solution 1 Drop(s) Right EYE every 4 hours  chlorhexidine 0.12% Liquid 15 milliLiter(s) Oral Mucosa every 12 hours  chlorhexidine 2% Cloths 1 Application(s) Topical <User Schedule>  doxazosin 8 milliGRAM(s) Oral at bedtime  erythromycin   Ointment 1 Application(s) Both EYES at bedtime  fluticasone propionate 50 MICROgram(s)/spray Nasal Spray 1 Spray(s) Both Nostrils two times a day  influenza   Vaccine 0.5 milliLiter(s) IntraMuscular once  ofloxacin 0.3% Solution 1 Drop(s) Right EYE four times a day  pantoprazole   Suspension 40 milliGRAM(s) Oral daily  petrolatum Ophthalmic Ointment 1 Application(s) Both EYES two times a day  polyethylene glycol 3350 17 Gram(s) Oral daily  propranolol 5 milliGRAM(s) Oral every 12 hours  senna 2 Tablet(s) Oral at bedtime  sodium chloride 0.65% Nasal 1 Spray(s) Both Nostrils two times a day    CULTURES:  Culture Results:   No growth at 5 days (02-01 @ 19:45)  Culture Results:   >100,000 CFU/ml Klebsiella aerogenes (Previously Enterobacter) (01-27 @ 23:20)    ASSESSMENT:  44M PMH ?stroke/MI present to [facility] after collapsing at work. Decorticate posturing, vomiting, intubated for airway protection. 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 Transferred to [facility] for further management. s/p trach 10/14. s/p peg 10/21. Re-upgrade to ICU 2/2 desaturation event and suctioning requirements 11/15. Re-upgrade to NSICU 12/15 2/2 desaturation and tachycardia.    AMS    Intubate    Handoff    MEWS Score    Acute myocardial infarction    CVA (cerebral vascular accident)    Intracerebral hemorrhage of brain stem    Brainstem stroke    Brain stem stroke syndrome    Brain stem hemorrhage    Brain stem stroke syndrome    Percutaneous tracheal puncture    Hemorrhagic stroke    Brainstem stroke    Pontine hemorrhage    Brainstem stroke    Encephalopathy acute    Functional quadriplegia    Advanced care planning/counseling discussion    Encounter for palliative care    Pontine hemorrhage    Neurogenic dysphagia    Chronic respiratory failure    Acute kidney injury superimposed on CKD    Acute urinary retention    Hypertensive emergency    Sepsis, unspecified organism    Sepsis    Gram-negative bacteremia    Dyspnea    Percutaneous tracheal puncture    Altered mental status examination    EGD, with PEG    AMS    Room Service Assist    EGD, with PEG    Functional quadriplegia    Neurogenic dysphagia    Chronic respiratory failure    Encounter for palliative care    Acute urinary retention    Hypertension    Seizures    [email or web address]    PLAN:  Neuro:  - neuro/vitals q4h  - pain control: tylenol prn  - seizure tx: keppra 1500mg BID, phenytoin 150/150/200  - s/p vEEG (1/26-1/27), (10/3-4), (10/17-10/19), (12/17-12/18), (12/22-12/25), (1/26-1/28),  +focal motor seizures not correlating w/ EEG  - CTH 9/30: enlarged pontine hemorrhage, CTH 10/3: stable, CTH 10/25: mostly resolved pontine hemorrhage, CTH 12/15: R mastoid air cell opacification; acute otitis media vs sterile effusion, CTH 12/18: stable. CTH 1/21 stable, CTH 1/27 stable  - MRI brain 10/12: parenchymal hemorrhage, acute/subacute R cerebellar stroke      CV:  - -160  - tachycardia: propranolol 5mg BID   - HTN: amlodipine 5mg  - echo (9/30) EF 75%, repeat 12/16: 57%    Resp:  - trach to vent, AC/VC 40/400/14/6  - Secretions: duonebs/mucomyst/chest PT q6h     GI:  - TFs via PEG  - bowel regimen, last BM 2/9  - baclofen 5q12 for hiccups    Renal:  - IVL  - FW 200cc q4h for hydration  - Voiding, SC prn  - CKD: trend BUN/Cr  - renal US 10/1, 10/8, 1/15: Increased bilateral renal echogenicity consistent with medical renal disease    Endo:  - A1c 5.4    Heme:  - DVT ppx: SCDs, SQH 5000u q8h   - LE dopplers negative 12/16    ID:  - last pan cx 1/27  - +klebsiella UTI s/p levaquin (1/30-2/3)  - empiric PNA: cefepime (12/22-12/30), s/p vanc (12/22-12/23), s/p zosyn (12/15-12/20), s/p vanc (12/15-12/16), s/p zosyn (1/12 -1/18)  - s/p Stenotrophomonas maltophilia PNA: s/p Bactrim (11/18-11/19) s/p Cefepime (11/16-11/18)  - 11/3, (+) sputum for stenotrophomas maltophlia, blood cx (+) klebsiella, cefepime 2gq12 (11/6 - 11/12)   - S/p Ancef (10/4-10/14) for PNA, and s/p Unasyn (10/18-10/23) +actinobacter baumanii     MISC:  - ophtho consult for keratitis  - erythromycin ointment b/l eye q4hrs, ofloxacin ointment R eye QID, artificial tears R eye q2hrs, moisture chamber at bedtime    Dispo: SDU status, DNR, pending PRUCOL for benefits     D/w Dr. D'Amico

## 2025-02-10 NOTE — PROGRESS NOTE ADULT - SUBJECTIVE AND OBJECTIVE BOX
OVERNIGHT EVENTS:  NAEO per nursing staff , no events of Telemetry     SUBJECTIVE : patient does not talk , he was awake and resting in bed     PHYSICAL EXAM:  General: NAD, on vent through trach, breathing is nonlabored  HEENT: trach collar in place  Lungs: clear to auscultation anteriorly  Heart: RRR, normal s1s2  Abdomen: soft, no distension, + BS, PEG tube in place, site is clean and dry   Extremities:  warm, wwp     VITAL SIGNS:  Vital Signs Last 24 Hrs  T(C): 36.4 (10 Feb 2025 09:01), Max: 37 (09 Feb 2025 21:55)  T(F): 97.6 (10 Feb 2025 09:01), Max: 98.6 (09 Feb 2025 21:55)  HR: 104 (10 Feb 2025 08:30) (76 - 104)  BP: 135/91 (10 Feb 2025 08:30) (98/66 - 135/91)  BP(mean): 108 (10 Feb 2025 08:30) (78 - 108)  RR: 18 (10 Feb 2025 08:30) (14 - 18)  SpO2: 100% (10 Feb 2025 08:30) (95% - 100%)    Parameters below as of 10 Feb 2025 08:30  Patient On (Oxygen Delivery Method): ventilator    O2 Concentration (%): 40    MEDICATIONS  (STANDING):  acetylcysteine 10%  Inhalation 4 milliLiter(s) Inhalation every 6 hours  albuterol/ipratropium for Nebulization 3 milliLiter(s) Nebulizer every 6 hours  amLODIPine   Tablet 5 milliGRAM(s) Oral daily  artificial tears (preservative free) Ophthalmic Solution 1 Drop(s) Right EYE every 4 hours  baclofen 5 milliGRAM(s) Oral every 12 hours  chlorhexidine 0.12% Liquid 15 milliLiter(s) Oral Mucosa every 12 hours  chlorhexidine 2% Cloths 1 Application(s) Topical <User Schedule>  doxazosin 8 milliGRAM(s) Oral at bedtime  erythromycin   Ointment 1 Application(s) Both EYES at bedtime  fluticasone propionate 50 MICROgram(s)/spray Nasal Spray 1 Spray(s) Both Nostrils two times a day  heparin   Injectable 5000 Unit(s) SubCutaneous every 8 hours  influenza   Vaccine 0.5 milliLiter(s) IntraMuscular once  levETIRAcetam 1500 milliGRAM(s) Oral every 12 hours  ofloxacin 0.3% Solution 1 Drop(s) Right EYE four times a day  pantoprazole   Suspension 40 milliGRAM(s) Oral daily  petrolatum Ophthalmic Ointment 1 Application(s) Both EYES two times a day  phenytoin   Suspension 150 milliGRAM(s) Oral <User Schedule>  phenytoin   Suspension 200 milliGRAM(s) Oral <User Schedule>  polyethylene glycol 3350 17 Gram(s) Oral daily  propranolol 5 milliGRAM(s) Oral every 12 hours  senna 2 Tablet(s) Oral at bedtime  sodium chloride 0.65% Nasal 1 Spray(s) Both Nostrils two times a day    MEDICATIONS  (PRN):  acetaminophen   Oral Liquid .. 650 milliGRAM(s) Oral every 6 hours PRN Temp greater or equal to 38C (100.4F), Mild Pain (1 - 3)  LORazepam   Injectable 2 milliGRAM(s) IV Push once PRN Seizure Activity (NOTIFY PROVIDER PRIOR TO GIVING)        LABS:                                              11.3   9.07  )-----------( 205      ( 10 Feb 2025 05:30 )             35.2     02-10    135  |  97  |  49[H]  ----------------------------<  115[H]  5.3   |  27  |  1.17    Ca    9.3      10 Feb 2025 05:30  Phos  4.3     02-10  Mg     2.4     02-10              RADIOLOGY & ADDITIONAL TESTS: Reviewed.

## 2025-02-10 NOTE — PROGRESS NOTE ADULT - ASSESSMENT
44M with unclear PMH (?stroke, MI) who was found down at work, intubated for airway protection and found to have acute parenchymal hemorrhage within nabil with mass effect (+ acute/subacute right cerebellar infarct) in setting of hypertensive emergency, transferred to [facility] for further neurosurgical care. Hospital course c/b poor neurologic recovery s/p trach-PEG, AUR s/p gabriel, ANIKA on CKD c/b hyperkalemia, HAP s/p amp-sulbactam (EOT 10/23), K aerogenes bacteremia  treated with 2 weeks of Cefepime per ID , On 11/15 he became septic and was transferred to NSICU due to increased o2 requirements and needed Vent support , Trach Cultures + for Stenotrophomonas which was treated with 7 days of Bactrim per ID , has been weaned of Vent since 11/23 and is now on 10lts at 40% o2 through Trach Collar. Stepped up to the NSICU on 12/15 for hypoxia and tachycardia. PE ruled out. On abx for 5 days. Unclear etiology. Now stepped down to 8Lach.    # Pontine hemorrhage  # Encephalopathy due to Intracranial Bleed   - Acute parenchymal hemorrhage within nabil with mass effect (+ acute/subacute right cerebellar infarct) in setting of hypertensive emergency.   - MRI brain also demonstrated acute/subacute R cerebellar stroke.   - No Neurosurgical Interventions were performed   - Secondary to IPH and cerebellar stroke - Trach and PEG Dependent    # Klebsiella UTI  - remains afebrile  - Urine cx 1/27 growing Klebsiella   - MRSA nares negative  - Completed 5 day course of Levaquin    # Seizures  - Continue Seizure precautions   - Continue Keppra and phenytoin - appreciate further recs from epilepsy    # Hypertension  - amlodipine 5mg daily , Propranolol  with holding parameters     # Chronic respiratory failure.   - s/p percutaneous trach by pulm on 10/14/24  - Currently on Vent Support   - Continue Chest PT    # Neurogenic dysphagia.   - Pt s/p PEG with surgery 10/21/24  - TF per nutrition  - aspiration precautions and elevation of HOB.    # Acute urinary retention.   - doxazosin 8mg  - monitor urine output    # Functional quadriplegia in setting of brainstem hemorrhage  - decub precautions  - care per nursing protocol     # CKD stage 3 , Baseline Creatinine 1.1     35 minutes spent on this encounter  including face to face with patient, review of chart, care coordination and documentation.  plan discussed with neurosurgery team.

## 2025-02-11 LAB
ANION GAP SERPL CALC-SCNC: 14 MMOL/L — SIGNIFICANT CHANGE UP (ref 5–17)
BUN SERPL-MCNC: 49 MG/DL — HIGH (ref 7–23)
CALCIUM SERPL-MCNC: 9.6 MG/DL — SIGNIFICANT CHANGE UP (ref 8.4–10.5)
CHLORIDE SERPL-SCNC: 96 MMOL/L — SIGNIFICANT CHANGE UP (ref 96–108)
CO2 SERPL-SCNC: 23 MMOL/L — SIGNIFICANT CHANGE UP (ref 22–31)
CREAT SERPL-MCNC: 1.18 MG/DL — SIGNIFICANT CHANGE UP (ref 0.5–1.3)
EGFR: 77 ML/MIN/1.73M2 — SIGNIFICANT CHANGE UP
EGFR: 77 ML/MIN/1.73M2 — SIGNIFICANT CHANGE UP
GLUCOSE SERPL-MCNC: 95 MG/DL — SIGNIFICANT CHANGE UP (ref 70–99)
HCT VFR BLD CALC: 33.6 % — LOW (ref 39–50)
HGB BLD-MCNC: 10.8 G/DL — LOW (ref 13–17)
MAGNESIUM SERPL-MCNC: 2.4 MG/DL — SIGNIFICANT CHANGE UP (ref 1.6–2.6)
MCHC RBC-ENTMCNC: 31.4 PG — SIGNIFICANT CHANGE UP (ref 27–34)
MCHC RBC-ENTMCNC: 32.1 G/DL — SIGNIFICANT CHANGE UP (ref 32–36)
MCV RBC AUTO: 97.7 FL — SIGNIFICANT CHANGE UP (ref 80–100)
NRBC BLD AUTO-RTO: 0 /100 WBCS — SIGNIFICANT CHANGE UP (ref 0–0)
PHOSPHATE SERPL-MCNC: 4.7 MG/DL — HIGH (ref 2.5–4.5)
PLATELET # BLD AUTO: 165 K/UL — SIGNIFICANT CHANGE UP (ref 150–400)
POTASSIUM SERPL-MCNC: 5.3 MMOL/L — SIGNIFICANT CHANGE UP (ref 3.5–5.3)
POTASSIUM SERPL-SCNC: 5.3 MMOL/L — SIGNIFICANT CHANGE UP (ref 3.5–5.3)
RBC # BLD: 3.44 M/UL — LOW (ref 4.2–5.8)
RBC # FLD: 13.1 % — SIGNIFICANT CHANGE UP (ref 10.3–14.5)
SODIUM SERPL-SCNC: 133 MMOL/L — LOW (ref 135–145)
WBC # BLD: 6.23 K/UL — SIGNIFICANT CHANGE UP (ref 3.8–10.5)
WBC # FLD AUTO: 6.23 K/UL — SIGNIFICANT CHANGE UP (ref 3.8–10.5)

## 2025-02-11 PROCEDURE — 99232 SBSQ HOSP IP/OBS MODERATE 35: CPT

## 2025-02-11 RX ADMIN — ERYTHROMYCIN 1 APPLICATION(S): 5 OINTMENT OPHTHALMIC at 22:43

## 2025-02-11 RX ADMIN — BACLOFEN 5 MILLIGRAM(S): 10 INJECTION INTRATHECAL at 06:41

## 2025-02-11 RX ADMIN — AMLODIPINE BESYLATE 5 MILLIGRAM(S): 10 TABLET ORAL at 06:41

## 2025-02-11 RX ADMIN — FLUTICASONE PROPIONATE 1 SPRAY(S): 50 SPRAY, METERED NASAL at 19:01

## 2025-02-11 RX ADMIN — OFLOXACIN 1 DROP(S): 3 SOLUTION OPHTHALMIC at 23:05

## 2025-02-11 RX ADMIN — Medication 15 MILLILITER(S): at 19:02

## 2025-02-11 RX ADMIN — Medication 650 MILLIGRAM(S): at 19:09

## 2025-02-11 RX ADMIN — Medication 650 MILLIGRAM(S): at 19:39

## 2025-02-11 RX ADMIN — Medication 200 MILLIGRAM(S): at 19:05

## 2025-02-11 RX ADMIN — Medication 150 MILLIGRAM(S): at 03:08

## 2025-02-11 RX ADMIN — DOXAZOSIN MESYLATE 8 MILLIGRAM(S): 8 TABLET ORAL at 22:43

## 2025-02-11 RX ADMIN — Medication 1 SPRAY(S): at 19:01

## 2025-02-11 RX ADMIN — OFLOXACIN 1 DROP(S): 3 SOLUTION OPHTHALMIC at 19:03

## 2025-02-11 RX ADMIN — Medication 1 DROP(S): at 14:45

## 2025-02-11 RX ADMIN — IPRATROPIUM BROMIDE AND ALBUTEROL SULFATE 3 MILLILITER(S): .5; 2.5 SOLUTION RESPIRATORY (INHALATION) at 19:02

## 2025-02-11 RX ADMIN — Medication 1 DROP(S): at 06:34

## 2025-02-11 RX ADMIN — Medication 1 DROP(S): at 10:17

## 2025-02-11 RX ADMIN — ACETYLCYSTEINE 4 MILLILITER(S): 200 INHALANT RESPIRATORY (INHALATION) at 23:07

## 2025-02-11 RX ADMIN — HEPARIN SODIUM 5000 UNIT(S): 1000 INJECTION INTRAVENOUS; SUBCUTANEOUS at 06:40

## 2025-02-11 RX ADMIN — Medication 1 APPLICATION(S): at 06:42

## 2025-02-11 RX ADMIN — HEPARIN SODIUM 5000 UNIT(S): 1000 INJECTION INTRAVENOUS; SUBCUTANEOUS at 15:36

## 2025-02-11 RX ADMIN — HEPARIN SODIUM 5000 UNIT(S): 1000 INJECTION INTRAVENOUS; SUBCUTANEOUS at 22:43

## 2025-02-11 RX ADMIN — BACLOFEN 5 MILLIGRAM(S): 10 INJECTION INTRATHECAL at 19:02

## 2025-02-11 RX ADMIN — Medication 150 MILLIGRAM(S): at 12:07

## 2025-02-11 RX ADMIN — LEVETIRACETAM 1500 MILLIGRAM(S): 10 INJECTION, SOLUTION INTRAVENOUS at 06:40

## 2025-02-11 RX ADMIN — IPRATROPIUM BROMIDE AND ALBUTEROL SULFATE 3 MILLILITER(S): .5; 2.5 SOLUTION RESPIRATORY (INHALATION) at 06:40

## 2025-02-11 RX ADMIN — Medication 1 SPRAY(S): at 06:35

## 2025-02-11 RX ADMIN — ACETYLCYSTEINE 4 MILLILITER(S): 200 INHALANT RESPIRATORY (INHALATION) at 19:02

## 2025-02-11 RX ADMIN — ACETYLCYSTEINE 4 MILLILITER(S): 200 INHALANT RESPIRATORY (INHALATION) at 06:40

## 2025-02-11 RX ADMIN — Medication 1 DROP(S): at 22:42

## 2025-02-11 RX ADMIN — IPRATROPIUM BROMIDE AND ALBUTEROL SULFATE 3 MILLILITER(S): .5; 2.5 SOLUTION RESPIRATORY (INHALATION) at 23:07

## 2025-02-11 RX ADMIN — FLUTICASONE PROPIONATE 1 SPRAY(S): 50 SPRAY, METERED NASAL at 06:35

## 2025-02-11 RX ADMIN — Medication 1 DROP(S): at 19:01

## 2025-02-11 RX ADMIN — OFLOXACIN 1 DROP(S): 3 SOLUTION OPHTHALMIC at 12:06

## 2025-02-11 RX ADMIN — OFLOXACIN 1 DROP(S): 3 SOLUTION OPHTHALMIC at 06:34

## 2025-02-11 RX ADMIN — LEVETIRACETAM 1500 MILLIGRAM(S): 10 INJECTION, SOLUTION INTRAVENOUS at 19:00

## 2025-02-11 RX ADMIN — POLYETHYLENE GLYCOL 3350 17 GRAM(S): 17 POWDER, FOR SOLUTION ORAL at 12:06

## 2025-02-11 RX ADMIN — Medication 15 MILLILITER(S): at 06:35

## 2025-02-11 RX ADMIN — IPRATROPIUM BROMIDE AND ALBUTEROL SULFATE 3 MILLILITER(S): .5; 2.5 SOLUTION RESPIRATORY (INHALATION) at 12:06

## 2025-02-11 RX ADMIN — Medication 1 APPLICATION(S): at 19:03

## 2025-02-11 RX ADMIN — ACETYLCYSTEINE 4 MILLILITER(S): 200 INHALANT RESPIRATORY (INHALATION) at 12:07

## 2025-02-11 RX ADMIN — Medication 1 DROP(S): at 02:05

## 2025-02-11 RX ADMIN — Medication 40 MILLIGRAM(S): at 12:07

## 2025-02-11 RX ADMIN — Medication 1 APPLICATION(S): at 06:41

## 2025-02-11 NOTE — PROGRESS NOTE ADULT - SUBJECTIVE AND OBJECTIVE BOX
HPI:  Unknown age male, unknown past medical history (reported stroke and MI by coworkers) presented to [facility] with AMS, Pt was working at [employer] when he was found down by coworkers. EMS called and pt brought to [facility] ED. Intubated, sedated, started on cardene for SBPs in 200s. CT head showed brain stem bleed. Transferred to NSICU for further management.  (30 Sep 2024 12:55)    INTERVAL EVENTS:  No acute events overnight.     HOSPITAL COURSE:  9/30: transferred from [facility]. A line placed. Versed dc'd. Cy Rader at bedside, states pt has family in [place], cannot confirm medications or PMH other than stroke and MI. 250cc bolus 3% given. LR switched to NS. hydralazine 25q8 started, 3% started, switched propofol to precedex   10/1: stability CTH done. Added labetalol, started TF. Palliative consulted. ethics consulted to determine surrogate. febrile 103, pan cx sent  10/2: BD 2, GEORGE overnight. TF resumed. Desatt'd to 80s, FiO2 inc. to 50. Fentanyl given, ABG, CXR ordered. Maxxed on precedex, started on propofol for DARIEN -4 - -5. Precedex dc'd. Duonebs, mucomyst, hypertonic added. 3% dc'd. Cardene dc'd. Start vanc/CTX. Increased labetalol 200q8. MRSA negative, dc'd vanc. ETT pulled back 2cm x 2, good positioning after confirmatory chest xray. Ethics attempting to establish HCP with family. Na 159, starting FW 250q6 for range 150-155.   10/3: BD3, GEROGE o/n, neuro stable. Na elevating, FW increased to 300q6. Dc'd bowel reg for diarrhea. vEEG started. SQH 5000q8 tonight.   10/4: BD 4, albumn bolus, incr. LR to 80 2/2 incr. in Cr, LR to 10 0cc/hr for uptrending Cr. Started 7% hypersal for 48hrs and SL atropine for inline/oral thick secretions. Dc'd CTX and started ancef for MSSA in the sputum. Nephrology consulted for CKD, f/u recs. SBP 170s, given hydralazine 10mg IVP.   10/5: BD5, o/n 10mg IVP hydralazine given for SBP 170s and started on hydralazine 25q8 via OGT. 10mg IV push labetalol for SBP > 160s. RT placed for diarrhea.   10/6: BD6, o/n FW increased to 350q4 per nephrology recs. IV tylenol for temp 100.6, SBp 160s presumed uncomfortable.   10/7: BD7, overnight pancultured for temp 101.8F.   10/8: BD8. GEORGE. Cr bumped. decreased LR to 75cc/hr. Adding simethicone ATC. incr hydralazine 50mgTID. Incr labetalol 300mgTID. Na 145, decreased FWF to 250q6. Start precedex. FENa consistent with intrinsic kidney injury. Pend repeat renal US. Retaining up to 1.3L, bladder scans q6, straight cath PRN  10/9: BD 9. GEORGE overnight. Neuro stable. abd xray for distention w non-specific gas pattern, OGT to LIWS for morning. duonebs/mucomyst to q8 for improving secretions. Changed tube feeds to Jevity 1.5 20cc/hr, low rate due to abdominal distention, nepro dense and more difficult to digest. Tolerating CPAP, confirmed by ABG.   10/10: BD 10. GEORGE overnight. Neuro stable. (+) gabriel for urinary retention on bladder scan. inc TF to goal rate of 40cc/hr. family leaning toward pursuing trach/PEG. 1/2 amp for FS 81.   10/11: BD 11. GEORGE overnight. Neuro stable. Trach/PEG consults placed.   10/12: BD 12. GEORGE overnight. Neuro stable. MRI brain complete.   10/13: BD 13. Increase flomax. Hold SQH after PM dose for trach tm. IVL.   10/14: BD 14. GEORGE overnight, remains on AC/VC. Gabriel placed for urinary retention. Dc'd free water.  S/p trach with pulm. NGT placed and CXR confirmed in good position.   10/15: BD 15, GEORGE ovn. resumed feeds. spiked 101, pan cx sent.   10/16: BD 16. GEORGE ovn. Lokelma 5mg for K+ 5.4. Started vanc q 24/zosyn for empiric PNA coverage, IVF to 100/hr. PEG held for fever.   10/17: BD 17,  ordered serum osm and urine osm for am. Started sinemet for neurostimulation. Increased cardura to 0.8. Started FW 100q4, dc'd IVF. MRSA negative, dc'd vanc. NGT replaced d/t coiling.   10/18: BD 18, GEORGE overnight, neuro stable. Amantadine added for neurostim. zosyn changed to unasyn for acinetobacter baumannii, failed TOV and required SC  10/19: BD 19, GEORGE ovn. cardura 2mg added for retention. labetalol decreased 200q8, hydralazine decreased 25q8. Gabriel replaced.   10/20: BD20, GEORGE overnight. NGT dislodged, replaced. PEG tomorrow w/ gen surg, FW increased to 150q4 and labetalol decreased to 100q8, lokelma given for hyperkalemia.   10/21: BD 21. POD0 PEG placement with Gen surg. decr labetolol to 50q8, incr. cardura to 0.4, started lokelma and phoslo, dc gabriel POD0 PEG placement with Gen surg.  10/22: BD 22. Plan to start TF today via PEG. dc labetalol, Following ophtho recs. Increased apnea settings - found to be in cheyne-moe respiration. CPAP 5/5.  10/23: BD 23. hydralazine d/c'd, trach collar trial today. Rectal tube placed at 6am.  10/24: BD24, o/n lokelma held due to diarrhea. Free water 100q6 resumed. dc'd tamsulosin, amantadine. Incr'd cardura to 8mg qhs. Dc'd FW. Switched jevity to nepro. gabriel placed for high urine output. Started SL atropine for oral secretions. Dc'd free water.  10/25: BD25, o/n decreased suctioning requirements to > q4hrs, GEORGE. Cr improving, cont phoslo, lokelma held at this time. Gabriel placed yest, cont. Tolerating trach collar. Given 500cc plasmalyte bolus for ANIKA. Dc'd sinemet.   10/26: BD26, o/n resumed lokelma 5mg daily and resumed 100cc free water q6hrs. Change in neuro status with new right pupillary dilation with anisocoria (right pupil 6mm fixed and left pupil 3mm briskly reactive). Given 23.4% NaCl bullet, taken for emergent CTH showing mostly resolved pontine hemorrhage, continued brainstem hypodensity likely edema d/t hemorrhage, no new hemorrhage or infarct, no herniation, mild increase in size of left lateral ventricle. Vitals remaine stable. Na goal > 140.   10/27: BD27, o/n GEORGE.Neuro stable. Pend stepdown with airway bed.   10/28: BD 28. GEORGE overnight. Neuro stable. Miralax ordered. Gabriel removed, pending TOV.  10/29: BD 29. GEORGE o/n. Given 2L NS over 8 hrs for increased BUN/Cr ratio. Gabriel placed for frequent straight cath.   10/30: BD 30.   10/31: BD 31. GEORGE overnight. Na 149, increased free water to 200q6. 1L NS for uptrending BUN.   11/1: BD 32. GEORGE overnight. Given 1L NS for dehydration. Na 146, increased FW to 250q6.   11/2: BD 33 GEORGE overnight, neuro stable, given 500cc bolus for net negative status and tachycardia   11/3: BD 34, GEORGE overnight, neuro stable. Patient remains tachycardic, EKG showing sinus tachycardia, given additional 500cc NS bolus. Febrile to 101.9F, pan cultured (without UA), CXR WNL, given tylenol.   11/4: BD 35, GEORGE overnight, neuro stable. Given 1L NS for tachycardia. sputum (+) for stenotropohomas maltophilia.   11/5: BD 36 GEORGE overnight, neuro stable. Vancomycin dc'd. Chest PT BID. ID consulted, cont zosyn.  11/6: BD 37. blood cx + klebsiella dc zosyn changed to cefepime, CTAP ordered, rpt blood cx sent.    11/7: BD 38. Pending CT A/P, given 250cc bolus and starting maintenance fluids overnight. Pending CT A/P after bolus   11/8: BD 39. CT CAP negative for infection.   11/9: BD 40. GEORGE overnight.  11/10: BD 41. GEORGE overnight. desat to 85 on trach collar, O2 inc to 10L and 100%, O2 sat inc to 95. pt tachy to 110s, euvolemic. given tylenol. ABG and CXR ordered. spiked fever, pancultured, RVP negative. AM ABG w pO2 79, rpt w pO2 79. pt appears comfortable, satting 94%.   11/11: BD 42. GEORGE overnight. pt became tachy to 130s, desat to 90 on 100% FiO2 and 10L. suctioned, (+) productive cough. temp 101.4, given 1g IV tylenol and 500cc NS bolus for euvolemia. fever and HR downtrending. LE dopplers negative for dvt  11/12: BD 43, GEORGE ovn, fever and HR downtrending, satting 97% 70% FIO2  11/13: BD 44, GEORGE ovn. started standing tylenol x24 hours for tachycardia. desat to 80s, o2 increased. CXR stable, pending CTA PE protocol.   11/14: BD 45, GEORGE overnight, neuro stable. resp therapy dec FiO2 to 70%.   11/15: BD 46, GEORGE overnight, neuro stable.  Rapid called for desaturation 30s, tachycardic 140s. Patient bagged, 100% fio2, heavily suctioned. CXR/POCUS unremarkable. ABG c/w desaturation. WBC 14.71. Afebrile. O2 improved to 90s and patient upgraded to ICU. ABG paO2 30s improved to 89 on vent. IV Tylenol x 1, sputum sent. Start protonix while o-n vent.   11/16: BD 47. POCUS showed collapsable IVCF, given 1L bolus. Vanco/Cefpime added empriic for PNA, NGT feeds restarted. MRSA swab neg, Vanc DC'd.   11/17: BD 48. GEORGE overnight. 1l bolus for tachycardia. Spiked to 101, cultured. 500cc bolus for tachycardia, tachy to 148 given 25mcg fentanyl, 250cc albumin, 1.5L bolus. 5 IV lopressor with response HR to 100s. +Stenotrophomonas on sputum cx.   11/18: BD 49. GEORGE overnight. Consulted ID, cefepime switched to bactrim x7days. Started hydrochlorothiazine 12.5mg daily.  11/19: BD 50. Tachy 120s, given tylenol and 500cc NS. Tolerating 5/5, switched to TCx. Holding phos binder. D/c Bactrim. D/c gabriel, f/u TOV. Dc'd PPI.   11/20: BD 51. GEORGE ovn. 1600 satting low 90s, mildly tachy to 110s, afebrile, RR wnl. O2 improved to mid 90s while inc O2 to 100% on TCx. CPAP 5/5 placed back on.  11/21: BD 52, GEORGE ovn, tolerating CPAP 5/5. Switch to trach collar during the day if tolerating well. HCTZ held for Cr bump, straight cath frequence increased to q4  11/22: BD 53, GEORGE ovn. Resumed phoslo. Gabriel placed. Resumed HCTZ.   11/23: BD 54. Holding tylenol in setting of possible fever, will require pan cx if febrile. Cr improved today. Cont CPAP. Bowel regimen held i/s/o diarrhea. FOBT negative.  11/24: BD 55. GEORGE overnight. Neuro stable. HCTZ dc'ed, started lisinopril 5. Lokelma dc'ed for K 3.7.   11/25: BD 56, GEORGE overnight. Neuro stable. dc'd lisinopril 5mg. Gabriel dc'd. TOV. 1545 noted to be hypotensive, MAP 50, in supine position on chair, HR 60s, afebrile, O2 96%. Given 1L cc NS bolus, placed back on bed in reverse trendelenberg, improved to map of 66. Neostick at bedside. Vitals check q1h. Dc'ed amlodipine. Failed TOV, bladder scan q6, sc prn. Added back Senna.   11/26: BD 57, GEORGE overnight. Neuro stable. Dc'd phoslo.   11/27: BD 58, GEORGE overnight. Neuro stable.    11/28: BD 59. Gabriel replaced.   11/29: GEORGE.  11/30: GEORGE, neuro stable.   12/1: BD 62, GEORGE overnight  12/2: BD 63, GEORGE overnight.; Given 1L bolus of LR for uptrending BUN/Cr.  12/3: BD 64. Reinstated eye gtt/moisture chamber given increased Rt eye injection  12/4: BD 65. GEORGE overnight. Attempted to speak with ophtho regarding eyelid weight/closure but no answer, full mailbox.   12/5: BD 66, GEORGE overnight, bowel regimen increased and had BM.   12/6: BD 67, GEORGE overnight, neuro stable.  12/7: GEORGE overnight, neuro stable. /110s, given x1 hydralazine 10 mg IVP. Restarted home amlodipine 5mg.  12/8: GEORGE. OOB to chair.     12/11: GEORGE, mucomyst added for thick secretions, simethicone for abd distension, abd xray with stool burden, increased bowel regimen.   12/12: GEORGE overnight.   12/13: GEORGE overnight.   12/14: GEORGE overnight  12/15: o/n Patient became tachycardic HR 120s and 10 minutes later O2 sat dropped as low as 89%. Patient suctioned without improvement in O2 sat and tube feeds found in suction catheter. TFs held.  STAT CXR ordered. STAT labs sent. Respiratory therapist called to bedside and patient trach connected to ventilator. After connection to ventilator and further suctioning O2 sat improved to 97% but patient HR remains 120-130s. Upgraded to NSICU for further management. Vancomycin and zosyn started. CTA  chest PE protocol and CTH ordered. Blood cultures sent.given 500cc bolus, rpt ABG sent pO2 243, CTH and CTA chest done. FS while NPO, FiO2 dec 50 pending ABG. sputum cx positive for few GPC and GNR.   12/16: GEORGE overnight, restarted amlodipine, troponin 75   12/17: GEORGE overnight, neuro stable. Attempted CPAP this morning, did not tolerate and back on full vent support. Dc'd Vanc. Tachycardia to 140s, noted extremities to be twitching along with jaw twitching. Given 2g of Keppra, total 4mg ativan and placed on EEG, full set of labs and lactate negative. Resumed trickle feeds at 20cc/hr. Given 250cc albumin for tachycardia.   12/18: GEORGE overnight. Neuro stable. CTH stable. EEG negative and dc'd.   12/19: GEORGE overnight. neuro stable. SIMV most of day, AC/VC at night.   12/20: GEORGE overnight, neuro stable. remains on VC/AC  12/21: GEORGE ovn. 1L bolus for tachy to 120s-130s.   12/22: GEORGE ovn. Tachy to 120s, given tylenol and IVF. 2mg IV ativan given for L jaw twitching. Sx resolved for 3 minutes and started again. Epilepsy contacted. Given 2mg IV ativan. Continued twitching. Increased keppra to 1500mg BID, 2mg ativan given. Propranolol started for refractory tachycardia. vEEG ordered. albumin given. POCUS performed, no b lines. Started on fosphenytoin, loaded w 20mg/kg then 100mg TID. febrile, pancx, started cefepime 2g q8, vancomycin  12/23: GEORGE ovn. +focal motor seizures on eeg. ID consulted. Vancomycin dc'ed as per ID.  12/24: GEORGE ovn, neuro stable. Baclofen 5 mg q12 started for hiccups. Na 129 from 134. Urine lytes c/w SIADH. Given 250cc 3% bolus. CT chest for infection w/u - f/u read. Repeat Na 136. UA negative  12/25: GEORGE ovn.   12/26: GEORGE ovn  12/27: GEORGE overnight. Blood cx neg @ 4 days, DC cefepime per medicine (sputum colonized).   12/28: Desaturation o/n to 80's, CXR obtained, pulse ox changed and sats resolved. Na 133 from 138, 250cc 3% bolus given. Cefepime resumed until 12/30 per ID. Rpt Na 138.  12/29: GEORGE overnight. Na stable.   12/30: GEORGE overnight. Family meeting with son, pt now DNR.   12/31: GEORGE overnight.   1/1: GEORGE overnight, neuro stable.  1/2: GEORGE overnight, neuro stable. FW decreased to 100q6.   1/3: GEORGE overnight, neuro stable. fosphenytoin IV changed to pheytoin PO via PEG per epilepsy recommendations  1/4: GEORGE overnight, neuro stable. FW incr. to 100q4  1/5: GEORGE overnight, neuro stable.   1/6: GEORGE overnight, neuro stable   1/7: GEORGE overnight, neuro stable. BUN/Cr increased, increased FW to 200q6. Given 1L bolus of LR over 2 hours. Gabriel d/c'd, voiding, continue bladder scan q6.   1/8: GEORGE overnight, neuro stable. BUN/Cr improving.  1/9: GEORGE overnight, neuro stable.   1/10: GEORGE overnight, neuro stable.   1/11: GEORGE overnight, neuro stable, vent settings stable.   1/12: GEORGE overnight, neuro stable. Febrile to 102F, f/u pan cx, chest xray, given tylenol. Zosyn started.   1/13: GEORGE overnight, neuro stable, cont Zosyn for presumed PNA, prelim sputum cx growing; few GS neg diplococci, mod GS neg rods, rare GS + rods, fever trend improved. Free water increased to [id number].   1/14: GEORGE overnight. pending renal US for elevated Cr  1/15: GEORGE ovn. renal US complete.   1/16: GEORGE overnight, neuro stable   1/17: GEORGE overnight, neuro stable   1/18: GEORGE overnight, neuro stable.   1/19: GEORGE overnight, neuro stable. Propranolol dec to 5q8.   1/20: GEORGE overnight, neuro stable. Weaned propranolol to 5mg BID.   1/21: GEORGE ovn, in AM having rapid vertical eye movements with facial twitching, 2g total keppra given for AM dose and phenytoin level ordered, vital signs stable. CTH stable. Phenytoin increased to 100/100/150mg per epilepsy  1/22: GEORGE ovn. IV hydral x 1 for SBP 170s.   1/23: Cont to have focal motor seizures. Per epilepsy, changed phenytoin dose to 100/100/200.   1/24: Phenytoin lvl tmrw AM. Chest Xray completed due to temp 100.2F  1/25: GEORGE overnight. Incr dilantin morning and afternoon per epilepsy.   1/26: GEORGE overnight. Sustained focal twitching noticed by nursing. Ativan 8mg in total given. Fosphenytoin 1500mg IV given. Placed on EEG. Epilepsy following. TFs held. Phenytoin increased to 150/150/200.   1/27: o/n CTH completed due to continued lethargy after ativan given yesterday afternoon. TFs resumed. 500cc bolus NS given for SBP 90s. EEG dc'ed, discussed w/ Dr. Tomlin. Febrile 101F, pan cx sent. Likely left sided infiltrate on CXR, c/f aspiration PNA. Started on vanc/zosyn. MRSA swab pending.   1/28: Neuro stable, on vanc/zosyn. +UTI on UA, MRSA negative. Vanc dc'd. RVP negative. Zosyn increased to pseudomonal dosing.   1/29: GEORGE overnight, neuro stable.  1/30: GEORGE overnight, neuro stable. Dc'd zosyn due to resistance, switched to Levaquin.  1/31: GEORGE ovenight, neuro stable.   2/1: Brief desat. Improved with neb and reposititioning. ABG and POCUS wnl.   2/4: GEORGE overnight  2/5: GEORGE overnight  2/6: GEORGE ovn  2/7: GEORGE ovn. L eye redness noted, started erythromycin and lacrilube to L eye as well. LR @ 100 cc/hr x 10 hours for uptrending BUN/Cr. Resumed bowel reg.   2/8: GEORGE overnight. Escalated bowel regimen.   2/9: GEORGE overnight.  2/10: GEORGE overnight. Social work to start working on SNF placement. Pending appointment with [employer] [employer] to come to hospital for biometrics.  2/11: GEORGE overnight. Neuro stable.     Vital Signs Last 24 Hrs  T(C): 37.3 (10 Feb 2025 22:04), Max: 37.3 (10 Feb 2025 22:04)  T(F): 99.2 (10 Feb 2025 22:04), Max: 99.2 (10 Feb 2025 22:04)  HR: 84 (11 Feb 2025 00:08) (78 - 104)  BP: 103/68 (11 Feb 2025 00:08) (98/66 - 135/91)  BP(mean): 80 (11 Feb 2025 00:08) (77 - 108)  RR: 14 (11 Feb 2025 00:08) (14 - 18)  SpO2: 96% (11 Feb 2025 00:08) (95% - 100%)  Patient On (Oxygen Delivery Method): ventilator  O2 Concentration (%): 40    I&O's Summary  09 Feb 2025 07:01  -  10 Feb 2025 07:00  --------------------------------------------------------  IN: 1524 mL / OUT: 1700 mL / NET: -176 mL    10 Feb 2025 07:01  -  11 Feb 2025 01:11  --------------------------------------------------------  IN: 2600 mL / OUT: 1650 mL / NET: 950 mL    PHYSICAL EXAM:  General: patient seen laying supine in bed in NAD, Martiniquais speaking  Neuro: opens eyes spontaneously, A+Ox3 with choices, tracks vertically, RUE squeezes hand to command, RLE wiggles toes to command, LUE 0/5, b/l lower extremities withdraw to noxious   HEENT: PERRL, vertical EOMI, left conjunctival injection with drainage or discharge   Neck: supple  Cardiac: RRR   Pulmonary: chest rise symmetric, nonlabored breathing, +trach  Abdomen: soft, nontender, nondistended, +PEG  Ext: perfusing well  Skin: warm, dry    TUBES/LINES:  [] Gabriel  [X] Trach  [] NGT  [X] PEG  [] Wound Drains  [] Others    DIET:  [] NPO  [] Mechanical  [X] Tube feeds    LABS:             11.3   9.07  )-----------( 205      ( 10 Feb 2025 05:30 )             35.2     135  |  97  |  49[H]  ----------------------------<  115[H]  5.3   |  27  |  1.17    Ca    9.3      10 Feb 2025 05:30  Phos  4.3     02-10  Mg     2.4     02-10    Urinalysis Basic - ( 10 Feb 2025 05:30 )  Color: x / Appearance: x / SG: x / pH: x  Gluc: 115 mg/dL / Ketone: x  / Bili: x / Urobili: x   Blood: x / Protein: x / Nitrite: x   Leuk Esterase: x / RBC: x / WBC x   Sq Epi: x / Non Sq Epi: x / Bacteria: x    Drug Levels: [X] N/A    CSF Analysis: [X] N/A    Allergies  Allergy Status Unknown    MEDICATIONS:  Antibiotics:    Neuro:  acetaminophen   Oral Liquid .. 650 milliGRAM(s) Oral every 6 hours PRN  baclofen 5 milliGRAM(s) Oral every 12 hours  levETIRAcetam 1500 milliGRAM(s) Oral every 12 hours  LORazepam   Injectable 2 milliGRAM(s) IV Push once PRN  phenytoin   Suspension 150 milliGRAM(s) Oral <User Schedule>  phenytoin   Suspension 200 milliGRAM(s) Oral <User Schedule>    Anticoagulation:  heparin   Injectable 5000 Unit(s) SubCutaneous every 8 hours    OTHER:  acetylcysteine 10%  Inhalation 4 milliLiter(s) Inhalation every 6 hours  albuterol/ipratropium for Nebulization 3 milliLiter(s) Nebulizer every 6 hours  amLODIPine   Tablet 5 milliGRAM(s) Oral daily  artificial tears (preservative free) Ophthalmic Solution 1 Drop(s) Right EYE every 4 hours  chlorhexidine 0.12% Liquid 15 milliLiter(s) Oral Mucosa every 12 hours  chlorhexidine 2% Cloths 1 Application(s) Topical <User Schedule>  doxazosin 8 milliGRAM(s) Oral at bedtime  erythromycin   Ointment 1 Application(s) Both EYES at bedtime  fluticasone propionate 50 MICROgram(s)/spray Nasal Spray 1 Spray(s) Both Nostrils two times a day  influenza   Vaccine 0.5 milliLiter(s) IntraMuscular once  ofloxacin 0.3% Solution 1 Drop(s) Right EYE four times a day  pantoprazole   Suspension 40 milliGRAM(s) Oral daily  petrolatum Ophthalmic Ointment 1 Application(s) Both EYES two times a day  polyethylene glycol 3350 17 Gram(s) Oral daily  propranolol 5 milliGRAM(s) Oral every 12 hours  senna 2 Tablet(s) Oral at bedtime  sodium chloride 0.65% Nasal 1 Spray(s) Both Nostrils two times a day    CULTURES:  Culture Results:   No growth at 5 days (02-01 @ 19:45)    ASSESSMENT:  44M PMH ?stroke/MI present to [facility] after collapsing at work. Decorticate posturing, vomiting, intubated for airway protection. 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 Transferred to [facility] for further management. s/p trach 10/14. s/p peg 10/21. Re-upgrade to ICU 2/2 desaturation event and suctioning requirements 11/15. Re-upgrade to NSICU 12/15 2/2 desaturation and tachycardia.    Neuro:  - neuro/vitals q4h  - pain control: tylenol prn  - seizure tx: keppra 1500mg BID, phenytoin 150/150/200  - s/p vEEG (10/3-4), (10/17-10/19), (12/17-12/18), (12/22-12/25), (1/26-1/28),  +focal motor seizures not correlating w/ EEG  - CTH 9/30: enlarged pontine hemorrhage, CTH 10/3: stable, CTH 10/25: mostly resolved pontine hemorrhage, CTH 12/15: R mastoid air cell opacification; acute otitis media vs sterile effusion, CTH 12/18: stable. CTH 1/21 stable, CTH 1/27 stable  - MRI brain 10/12: parenchymal hemorrhage, acute/subacute R cerebellar stroke      CV:  - -160  - tachycardia: propranolol 5mg BID   - HTN: amlodipine 5mg  - echo (9/30) EF 75%, repeat 12/16: 57%    Resp:  - trach to vent, AC/VC 40/400/14/6  - Secretions: duonebs/mucomyst/chest PT q6h     GI:  - TFs via PEG  - bowel regimen, last BM 2/9  - baclofen 5q12 for hiccups    Renal:  - IVL  - FW 200cc q4h for hydration  - Voiding, SC prn  - CKD: trend BUN/Cr  - renal US 10/1, 10/8, 1/15: Increased bilateral renal echogenicity consistent with medical renal disease    Endo:  - A1c 5.4    Heme:  - DVT ppx: SCDs, SQH 5000u q8h   - LE dopplers negative 12/16    ID:  - last pan cx 1/27  - +klebsiella UTI s/p levaquin (1/30-2/3)  - empiric PNA: cefepime (12/22-12/30), s/p vanc (12/22-12/23), s/p zosyn (12/15-12/20), s/p vanc (12/15-12/16), s/p zosyn (1/12 -1/18)  - s/p Stenotrophomonas maltophilia PNA: s/p Bactrim (11/18-11/19) s/p Cefepime (11/16-11/18)  - 11/3, (+) sputum for stenotrophomas maltophlia, blood cx (+) klebsiella, cefepime 2gq12 (11/6 - 11/12)   - S/p Ancef (10/4-10/14) for PNA, and s/p Unasyn (10/18-10/23) +actinobacter baumanii     MISC:  - ophtho consult for keratitis  - erythromycin ointment b/l eye q4hrs, ofloxacin ointment R eye QID, artificial tears R eye q4hrs, moisture chamber at bedtime    Dispo: SDU status, DNR, pending PRUCOL for benefits     D/w Dr. D'Amico

## 2025-02-11 NOTE — PROGRESS NOTE ADULT - SUBJECTIVE AND OBJECTIVE BOX
OVERNIGHT EVENTS:  NAEO per nursing staff , no events of Telemetry     SUBJECTIVE : patient does not talk , he was awake and resting in bed     PHYSICAL EXAM:  General: NAD, on vent through trach, breathing is nonlabored  HEENT: trach collar in place  Lungs: clear to auscultation anteriorly  Heart: RRR, normal s1s2  Abdomen: soft, no distension, + BS, PEG tube in place, site is clean and dry   Extremities:  warm, wwp     VITAL SIGNS:  Vital Signs Last 24 Hrs  T(C): 36.4 (11 Feb 2025 09:07), Max: 37.3 (10 Feb 2025 22:04)  T(F): 97.5 (11 Feb 2025 09:07), Max: 99.2 (10 Feb 2025 22:04)  HR: 68 (11 Feb 2025 08:45) (68 - 92)  BP: 120/86 (11 Feb 2025 08:45) (100/63 - 120/86)  BP(mean): 99 (11 Feb 2025 08:45) (77 - 99)  RR: 14 (11 Feb 2025 08:45) (14 - 18)  SpO2: 99% (11 Feb 2025 08:45) (96% - 99%)    Parameters below as of 11 Feb 2025 08:45  Patient On (Oxygen Delivery Method): ventilator    O2 Concentration (%): 40    MEDICATIONS  (STANDING):  acetylcysteine 10%  Inhalation 4 milliLiter(s) Inhalation every 6 hours  albuterol/ipratropium for Nebulization 3 milliLiter(s) Nebulizer every 6 hours  amLODIPine   Tablet 5 milliGRAM(s) Oral daily  artificial tears (preservative free) Ophthalmic Solution 1 Drop(s) Right EYE every 4 hours  baclofen 5 milliGRAM(s) Oral every 12 hours  chlorhexidine 0.12% Liquid 15 milliLiter(s) Oral Mucosa every 12 hours  chlorhexidine 2% Cloths 1 Application(s) Topical <User Schedule>  doxazosin 8 milliGRAM(s) Oral at bedtime  erythromycin   Ointment 1 Application(s) Both EYES at bedtime  fluticasone propionate 50 MICROgram(s)/spray Nasal Spray 1 Spray(s) Both Nostrils two times a day  heparin   Injectable 5000 Unit(s) SubCutaneous every 8 hours  influenza   Vaccine 0.5 milliLiter(s) IntraMuscular once  levETIRAcetam 1500 milliGRAM(s) Oral every 12 hours  ofloxacin 0.3% Solution 1 Drop(s) Right EYE four times a day  pantoprazole   Suspension 40 milliGRAM(s) Oral daily  petrolatum Ophthalmic Ointment 1 Application(s) Both EYES two times a day  phenytoin   Suspension 150 milliGRAM(s) Oral <User Schedule>  phenytoin   Suspension 200 milliGRAM(s) Oral <User Schedule>  polyethylene glycol 3350 17 Gram(s) Oral daily  propranolol 5 milliGRAM(s) Oral every 12 hours  senna 2 Tablet(s) Oral at bedtime  sodium chloride 0.65% Nasal 1 Spray(s) Both Nostrils two times a day    MEDICATIONS  (PRN):  acetaminophen   Oral Liquid .. 650 milliGRAM(s) Oral every 6 hours PRN Temp greater or equal to 38C (100.4F), Mild Pain (1 - 3)  LORazepam   Injectable 2 milliGRAM(s) IV Push once PRN Seizure Activity (NOTIFY PROVIDER PRIOR TO GIVING)          LABS:                                              11.3   9.07  )-----------( 205      ( 10 Feb 2025 05:30 )             35.2       02-10    135  |  97  |  49[H]  ----------------------------<  115[H]  5.3   |  27  |  1.17    Ca    9.3      10 Feb 2025 05:30  Phos  4.3     02-10  Mg     2.4     02-10

## 2025-02-11 NOTE — CHART NOTE - NSCHARTNOTESELECT_GEN_ALL_CORE
Nutrition Services Cosentyx Counseling:  I discussed with the patient the risks of Cosentyx including but not limited to worsening of Crohn's disease, immunosuppression, allergic reactions and infections.  The patient understands that monitoring is required including a PPD at baseline and must alert us or the primary physician if symptoms of infection or other concerning signs are noted.

## 2025-02-11 NOTE — CHART NOTE - NSCHARTNOTEFT_GEN_A_CORE
Admitting Diagnosis:   Patient is a 44y old  Male who presents with a chief complaint of brain stem bleed (11 Feb 2025 12:20)  PAST MEDICAL & SURGICAL HISTORY:  Acute myocardial infarction  CVA (cerebral vascular accident)    Current Nutrition Order: NPO with tube feedings: [employer] Standard 1.4 via gastrostomy: GOAL of 76mL/hour x 18 hours, this provides: 1368mL total volume, 1915 calories, 85g protein, 971mL free water; this meets ~25 calories/kg actual body weight, 1.12g protein/kg actual body weight;    PO Intake: Good (%) [   ]  Fair (50-75%) [   ] Poor (<25%) [   ] *NPO with tube feedings [ x ]*    GI Issues: no noted GI upset or tolerance issues per nursing; fecal incontinence noted; BM on 2/9/25; no documented abdominal distention    Pain: nonverbal indicators of pain absent    Skin Integrity: bruised (ecchymosis), incisions per chart, no documented edema; Mookie: Kylee       02-10-25 @ 07:01  -  02-11-25 @ 07:00  --------------------------------------------------------  IN: 3144 mL / OUT: 2050 mL / NET: 1094 mL    02-11-25 @ 07:01  - 02-11-25 @ 12:28  --------------------------------------------------------  IN: 392 mL / OUT: 100 mL / NET: 292 mL      Labs:   02-10    135  |  97  |  49[H]  ----------------------------<  115[H]  5.3   |  27  |  1.17    Ca    9.3      10 Feb 2025 05:30  Phos  4.3     02-10  Mg     2.4     02-10    CAPILLARY BLOOD GLUCOSE    Medications:  MEDICATIONS  (STANDING):  acetylcysteine 10%  Inhalation 4 milliLiter(s) Inhalation every 6 hours  albuterol/ipratropium for Nebulization 3 milliLiter(s) Nebulizer every 6 hours  amLODIPine   Tablet 5 milliGRAM(s) Oral daily  artificial tears (preservative free) Ophthalmic Solution 1 Drop(s) Right EYE every 4 hours  baclofen 5 milliGRAM(s) Oral every 12 hours  chlorhexidine 0.12% Liquid 15 milliLiter(s) Oral Mucosa every 12 hours  chlorhexidine 2% Cloths 1 Application(s) Topical <User Schedule>  doxazosin 8 milliGRAM(s) Oral at bedtime  erythromycin   Ointment 1 Application(s) Both EYES at bedtime  fluticasone propionate 50 MICROgram(s)/spray Nasal Spray 1 Spray(s) Both Nostrils two times a day  heparin   Injectable 5000 Unit(s) SubCutaneous every 8 hours  influenza   Vaccine 0.5 milliLiter(s) IntraMuscular once  levETIRAcetam 1500 milliGRAM(s) Oral every 12 hours  ofloxacin 0.3% Solution 1 Drop(s) Right EYE four times a day  pantoprazole   Suspension 40 milliGRAM(s) Oral daily  petrolatum Ophthalmic Ointment 1 Application(s) Both EYES two times a day  phenytoin   Suspension 150 milliGRAM(s) Oral <User Schedule>  phenytoin   Suspension 200 milliGRAM(s) Oral <User Schedule>  polyethylene glycol 3350 17 Gram(s) Oral daily  propranolol 5 milliGRAM(s) Oral every 12 hours  senna 2 Tablet(s) Oral at bedtime  sodium chloride 0.65% Nasal 1 Spray(s) Both Nostrils two times a day    MEDICATIONS  (PRN):  acetaminophen   Oral Liquid .. 650 milliGRAM(s) Oral every 6 hours PRN Temp greater or equal to 38C (100.4F), Mild Pain (1 - 3)  LORazepam   Injectable 2 milliGRAM(s) IV Push once PRN Seizure Activity (NOTIFY PROVIDER PRIOR TO GIVING)    Dosing Anthropometrics:   Height for BMI (FEET)	5 Feet  Height for BMI (INCHES)	8 Inch(s)  Height for BMI (CM)	172.72 Centimeter(s)  Weight for BMI (lbs)	176.4 lb  Weight for BMI (kg)	80 kg  Body Mass Index	              26.8  ideal body weight:               154 pounds / 70 kg   % ideal body weight             114%     Weight Change: No new wt obtained since admit per nursing documentation, strongly recommend nursing to obtain new wt to track/trend changes.   RD obtained bedweight today (1/23/25) of ~79.5kg, consistent with admission weight. RD to follow up with nursing.   RD obtained bedweight today (2/4/25) of ~76.1kg, consistent with admission weight. RD to follow up with nursing.   RD obtained bedweight today (2/11/25) of ~76.2kg, not significantly decreased compared to admission wt.    Estimated energy needs:  Weight used for calculations: most recent actual body weight  weight: 76.1kg (~167%)  calories: 25-30 calories/kg = ~9706-6576 calories/day  protein: 1.1-1.4g protein/kg = ~84-107g protein/day  fluids: 1mL/kcal = ~1903-2283mL/day  Other Calculations	Pt is ~% ideal body weight (~109%), thus actual body weight used for all calculations. Needs adjusted for age, clinical course.     Subjective: This is a 44 year old male, unknown past medical history (reported stroke and MI by coworkers) presented to [facility] with AMS, Pt was working at [employer] when he was found down by coworkers. EMS called and pt brought to [facility] ED. Intubated, sedated, started on cardene for SBPs in 200s. CT head showed brain stem bleed. Transferred to NSICU for further management. Pt is status post tracheostomy 10/14. Pt is status post PEG on 10/21. Re-upgrade to ICU in the setting of desaturation event and suctioning requirements 11/15. Re-upgrade to NSICU 12/15 2/2 desaturation and tachycardia. Pt has since been stepped down to [facility].    Patient seen at bedside on [facility] for nutrition follow up. RD spoke to nursing on the unit, pt was resting in bed. Current diet order: NPO with tube feeds of [employer] Standard formula, at goal rate of 76 mL/hr x18 hrs (providing ~ 1915 kcal, 85g protein, 971 mL free water), meeting 100% estimated nutrient needs. No tube feeding tolerance issues noted. GI within normal limits at this time as per flowsheets, fecal incontinence noted, BM on 2/9/25. Labs: BUN elevated (49), electrolytes such as sodium, potassium, phosphorus, magnesium are within normal limits; eGFR is within normal ranges; medical team is aware, RD to monitor trends. Medications reviewed as above. RD has spoken with medical team about current formula and tolerance, medical team is in agreement with keeping the new formula. RD will continue to follow, please see nutrition recommendations below.     Previous Nutrition Diagnosis: Inadequate Oral Intake related to inability to meet nutritional demands via PO as evidenced by NPO with tube feedings    Active [ x ]  Resolved [   ]    Goal: Pt will consume >/= 75% of estimated nutrient needs via tolerated route     Nutrition Recommendations:  1. NPO  **Continue the following regimen if renal formula is no longer necessary or warranted: if team would like to keep the same brand, consider [employer] Standard 1.4 via gastrostomy: GOAL of 76mL/hour x 18 hours, this provides: 1368mL total volume, 1915 calories, 85g protein, 971mL free water; this meets ~25 calories/kg actual body weight, 1.12g protein/kg actual body weight; consider ~240mL free water flushes every 6 hours**  **if alternative formula requested in the instance of insurance-related concerns, please consult dietitian/nutrition team for updated formula recommendations**  **Provide Banatrol Tube Feeding prn**  **Maintain aspiration precautions at all times; monitor for signs/symptoms of intolerance**  2. Monitor weights (weekly), GI function, skin integrity; electrolytes, BMP, glucose, CBC per MD discretion *renal indices*  3. Pain and bowel regimen per medical team  4. Align nutrition with goals of care at all times  5. Recommend nursing to obtain new weights to track/trend changes    Education: deferred at this time related to pt resting in bed, and cognitive status.     Risk Level: High [   ] Moderate [ x ] Low [   ]. Admitting Diagnosis:   Patient is a 44y old  Male who presents with a chief complaint of brain stem bleed (11 Feb 2025 12:20)  PAST MEDICAL & SURGICAL HISTORY:  Acute myocardial infarction  CVA (cerebral vascular accident)    Current Nutrition Order: NPO with tube feedings: Maribethdipesh Sau[employer] Standard 1.4 via gastrostomy: GOAL of 76mL/hour x 18 hours, this provides: 1368mL total volume, 1915 calories, 85g protein, 971mL free water; this meets ~25 calories/kg actual body weight, 1.12g protein/kg actual body weight;    PO Intake: Good (%) [   ]  Fair (50-75%) [   ] Poor (<25%) [   ] *NPO with tube feedings [ x ]*    GI Issues: no noted GI upset or tolerance issues per nursing; fecal incontinence noted; BM on 2/9/25; no documented abdominal distention    Pain: nonverbal indicators of pain absent    Skin Integrity: bruised (ecchymosis), incisions per chart, no documented edema; Mookie: Kylee       02-10-25 @ 07:01  -  02-11-25 @ 07:00  --------------------------------------------------------  IN: 3144 mL / OUT: 2050 mL / NET: 1094 mL    02-11-25 @ 07:01  - 02-11-25 @ 12:28  --------------------------------------------------------  IN: 392 mL / OUT: 100 mL / NET: 292 mL      Labs:   02-10    135  |  97  |  49[H]  ----------------------------<  115[H]  5.3   |  27  |  1.17    Ca    9.3      10 Feb 2025 05:30  Phos  4.3     02-10  Mg     2.4     02-10    CAPILLARY BLOOD GLUCOSE    Medications:  MEDICATIONS  (STANDING):  acetylcysteine 10%  Inhalation 4 milliLiter(s) Inhalation every 6 hours  albuterol/ipratropium for Nebulization 3 milliLiter(s) Nebulizer every 6 hours  amLODIPine   Tablet 5 milliGRAM(s) Oral daily  artificial tears (preservative free) Ophthalmic Solution 1 Drop(s) Right EYE every 4 hours  baclofen 5 milliGRAM(s) Oral every 12 hours  chlorhexidine 0.12% Liquid 15 milliLiter(s) Oral Mucosa every 12 hours  chlorhexidine 2% Cloths 1 Application(s) Topical <User Schedule>  doxazosin 8 milliGRAM(s) Oral at bedtime  erythromycin   Ointment 1 Application(s) Both EYES at bedtime  fluticasone propionate 50 MICROgram(s)/spray Nasal Spray 1 Spray(s) Both Nostrils two times a day  heparin   Injectable 5000 Unit(s) SubCutaneous every 8 hours  influenza   Vaccine 0.5 milliLiter(s) IntraMuscular once  levETIRAcetam 1500 milliGRAM(s) Oral every 12 hours  ofloxacin 0.3% Solution 1 Drop(s) Right EYE four times a day  pantoprazole   Suspension 40 milliGRAM(s) Oral daily  petrolatum Ophthalmic Ointment 1 Application(s) Both EYES two times a day  phenytoin   Suspension 150 milliGRAM(s) Oral <User Schedule>  phenytoin   Suspension 200 milliGRAM(s) Oral <User Schedule>  polyethylene glycol 3350 17 Gram(s) Oral daily  propranolol 5 milliGRAM(s) Oral every 12 hours  senna 2 Tablet(s) Oral at bedtime  sodium chloride 0.65% Nasal 1 Spray(s) Both Nostrils two times a day    MEDICATIONS  (PRN):  acetaminophen   Oral Liquid .. 650 milliGRAM(s) Oral every 6 hours PRN Temp greater or equal to 38C (100.4F), Mild Pain (1 - 3)  LORazepam   Injectable 2 milliGRAM(s) IV Push once PRN Seizure Activity (NOTIFY PROVIDER PRIOR TO GIVING)    Dosing Anthropometrics:   Height for BMI (FEET)	5 Feet  Height for BMI (INCHES)	8 Inch(s)  Height for BMI (CM)	172.72 Centimeter(s)  Weight for BMI (lbs)	176.4 lb  Weight for BMI (kg)	80 kg  Body Mass Index	              26.8  ideal body weight:               154 pounds / 70 kg   % ideal body weight             114%     Weight Change: No new wt obtained since admit per nursing documentation, strongly recommend nursing to obtain new wt to track/trend changes.   RD obtained bedweight today (1/23/25) of ~79.5kg, consistent with admission weight. RD to follow up with nursing.   RD obtained bedweight today (2/4/25) of ~76.1kg, consistent with admission weight. RD to follow up with nursing.   RD obtained bedweight today (2/11/25) of ~76.2kg, not significantly decreased compared to admission wt.    Estimated energy needs:  Weight used for calculations: most recent actual body weight  weight: 76.1kg (~167%)  calories: 25-30 calories/kg = ~3887-0203 calories/day  protein: 1.1-1.4g protein/kg = ~84-107g protein/day  fluids: 1mL/kcal = ~1903-2283mL/day  Other Calculations	Pt is ~% ideal body weight (~109%), thus actual body weight used for all calculations. Needs adjusted for age, clinical course.     Subjective: This is a 44 year old male, unknown past medical history (reported stroke and MI by coworkers) presented to [facility] with AMS, Pt was working at [employer] when he was found down by coworkers. EMS called and pt brought to [facility] ED. Intubated, sedated, started on cardene for SBPs in 200s. CT head showed brain stem bleed. Transferred to NSICU for further management. Pt is status post tracheostomy 10/14. Pt is status post PEG on 10/21. Re-upgrade to ICU in the setting of desaturation event and suctioning requirements 11/15. Re-upgrade to NSICU 12/15 2/2 desaturation and tachycardia. Pt has since been stepped down to [facility].    Patient seen at bedside on [facility] for nutrition follow up. RD spoke to nursing on the unit, pt was resting in bed. Current diet order: NPO with tube feeds of [employer] Standard formula, at goal rate of 76 mL/hr x18 hrs (providing ~ 1915 kcal, 85g protein, 971 mL free water), meeting 100% estimated nutrient needs. No tube feeding tolerance issues noted. GI within normal limits at this time as per flowsheets, fecal incontinence noted, BM on 2/9/25. Labs: BUN elevated (49), electrolytes such as potassium, magnesium are within normal limits, phosphorus is slightly elevated (4.7), sodium slightly low (133); eGFR is within normal ranges; medical team is aware, RD to monitor trends. Medications reviewed as above. RD has spoken with medical team about current formula and tolerance, medical team is in agreement with keeping the new formula. RD will continue to follow, please see nutrition recommendations below.     Previous Nutrition Diagnosis: Inadequate Oral Intake related to inability to meet nutritional demands via PO as evidenced by NPO with tube feedings    Active [ x ]  Resolved [   ]    Goal: Pt will consume >/= 75% of estimated nutrient needs via tolerated route     Nutrition Recommendations:  1. NPO  **Continue the following regimen if renal formula is no longer necessary or warranted: if team would like to keep the same brand, consider [employer] Standard 1.4 via gastrostomy: GOAL of 76mL/hour x 18 hours, this provides: 1368mL total volume, 1915 calories, 85g protein, 971mL free water; this meets ~25 calories/kg actual body weight, 1.12g protein/kg actual body weight; consider ~240mL free water flushes every 6 hours**  **IF pt's renal labs are a concern again, consider the following regimen: **[employer] Renal Support 1.8: at goal of 60mL/hour x 18 hours, providing 1080mL total volume, 1944 calories, 86g protein, ~713mL free water; this meets ~24 calories/kg ideal body weight, ~1.1g protein/kg ideal body weight**  **if alternative formula requested in the instance of insurance-related concerns, please consult dietitian/nutrition team for updated formula recommendations**  **Provide Banatrol Tube Feeding prn**  **Maintain aspiration precautions at all times; monitor for signs/symptoms of intolerance**  2. Monitor weights (weekly), GI function, skin integrity; electrolytes, BMP, glucose, CBC per MD discretion *renal indices*  3. Pain and bowel regimen per medical team  4. Align nutrition with goals of care at all times  5. Recommend nursing to obtain new weights to track/trend changes    Education: deferred at this time related to pt resting in bed, and cognitive status.     Risk Level: High [   ] Moderate [ x ] Low [   ].

## 2025-02-11 NOTE — PROGRESS NOTE ADULT - ASSESSMENT
44M with unclear PMH (?stroke, MI) who was found down at work, intubated for airway protection and found to have acute parenchymal hemorrhage within nabil with mass effect (+ acute/subacute right cerebellar infarct) in setting of hypertensive emergency, transferred to [facility] for further neurosurgical care. Hospital course c/b poor neurologic recovery s/p trach-PEG, AUR s/p gabriel, ANIKA on CKD c/b hyperkalemia, HAP s/p amp-sulbactam (EOT 10/23), K aerogenes bacteremia  treated with 2 weeks of Cefepime per ID , On 11/15 he became septic and was transferred to NSICU due to increased o2 requirements and needed Vent support , Trach Cultures + for Stenotrophomonas which was treated with 7 days of Bactrim per ID , has been weaned of Vent since 11/23 and is now on 10lts at 40% o2 through Trach Collar. Stepped up to the NSICU on 12/15 for hypoxia and tachycardia. PE ruled out. On abx for 5 days. Unclear etiology. Now stepped down to [facility].    # Pontine hemorrhage  # Encephalopathy due to Intracranial Bleed   - Acute parenchymal hemorrhage within nabil with mass effect (+ acute/subacute right cerebellar infarct) in setting of hypertensive emergency.   - MRI brain also demonstrated acute/subacute R cerebellar stroke.   - No Neurosurgical Interventions were performed   - Secondary to IPH and cerebellar stroke - Trach and PEG Dependent    # Klebsiella UTI  - remains afebrile  - Urine cx 1/27 growing Klebsiella   - MRSA nares negative  - Completed 5 day course of Levaquin    # Seizures  - Continue Seizure precautions   - Continue Keppra and phenytoin - appreciate further recs from epilepsy    # Hypertension  - amlodipine 5mg daily , Propranolol  with holding parameters     # Chronic respiratory failure.   - s/p percutaneous trach by pulm on 10/14/24  - Currently on Vent Support   - Continue Chest PT    # Neurogenic dysphagia.   - Pt s/p PEG with surgery 10/21/24  - TF per nutrition  - aspiration precautions and elevation of HOB.    # Acute urinary retention.   - doxazosin 8mg  - monitor urine output    # Functional quadriplegia in setting of brainstem hemorrhage  - decub precautions  - care per nursing protocol     # CKD stage 3 , Baseline Creatinine 1.1   - check bmp in am , monitor K , slowly trending up, only med that can cause elevation his heparin Sub q , if > 5.5 administer lokelma 5mg po x 1     35 minutes spent on this encounter  including face to face with patient, review of chart, care coordination and documentation.  plan discussed with neurosurgery team.

## 2025-02-12 LAB
ANION GAP SERPL CALC-SCNC: 11 MMOL/L — SIGNIFICANT CHANGE UP (ref 5–17)
BUN SERPL-MCNC: 50 MG/DL — HIGH (ref 7–23)
CALCIUM SERPL-MCNC: 9.7 MG/DL — SIGNIFICANT CHANGE UP (ref 8.4–10.5)
CHLORIDE SERPL-SCNC: 96 MMOL/L — SIGNIFICANT CHANGE UP (ref 96–108)
CO2 SERPL-SCNC: 26 MMOL/L — SIGNIFICANT CHANGE UP (ref 22–31)
CREAT SERPL-MCNC: 1.19 MG/DL — SIGNIFICANT CHANGE UP (ref 0.5–1.3)
EGFR: 76 ML/MIN/1.73M2 — SIGNIFICANT CHANGE UP
EGFR: 76 ML/MIN/1.73M2 — SIGNIFICANT CHANGE UP
GLUCOSE SERPL-MCNC: 98 MG/DL — SIGNIFICANT CHANGE UP (ref 70–99)
HCT VFR BLD CALC: 44.9 % — SIGNIFICANT CHANGE UP (ref 39–50)
HGB BLD-MCNC: 14 G/DL — SIGNIFICANT CHANGE UP (ref 13–17)
MAGNESIUM SERPL-MCNC: 2.5 MG/DL — SIGNIFICANT CHANGE UP (ref 1.6–2.6)
MCHC RBC-ENTMCNC: 31.2 G/DL — LOW (ref 32–36)
MCHC RBC-ENTMCNC: 31.5 PG — SIGNIFICANT CHANGE UP (ref 27–34)
MCV RBC AUTO: 101.1 FL — HIGH (ref 80–100)
NRBC BLD AUTO-RTO: 0 /100 WBCS — SIGNIFICANT CHANGE UP (ref 0–0)
PHOSPHATE SERPL-MCNC: 4.9 MG/DL — HIGH (ref 2.5–4.5)
PLATELET # BLD AUTO: 221 K/UL — SIGNIFICANT CHANGE UP (ref 150–400)
POTASSIUM SERPL-MCNC: 5.5 MMOL/L — HIGH (ref 3.5–5.3)
POTASSIUM SERPL-SCNC: 5.5 MMOL/L — HIGH (ref 3.5–5.3)
RBC # BLD: 4.44 M/UL — SIGNIFICANT CHANGE UP (ref 4.2–5.8)
RBC # FLD: 13 % — SIGNIFICANT CHANGE UP (ref 10.3–14.5)
SODIUM SERPL-SCNC: 133 MMOL/L — LOW (ref 135–145)
WBC # BLD: 4.92 K/UL — SIGNIFICANT CHANGE UP (ref 3.8–10.5)
WBC # FLD AUTO: 4.92 K/UL — SIGNIFICANT CHANGE UP (ref 3.8–10.5)

## 2025-02-12 PROCEDURE — 99232 SBSQ HOSP IP/OBS MODERATE 35: CPT

## 2025-02-12 PROCEDURE — 99233 SBSQ HOSP IP/OBS HIGH 50: CPT

## 2025-02-12 RX ORDER — SODIUM ZIRCONIUM CYCLOSILICATE 5 G/5G
5 POWDER, FOR SUSPENSION ORAL ONCE
Refills: 0 | Status: COMPLETED | OUTPATIENT
Start: 2025-02-12 | End: 2025-02-12

## 2025-02-12 RX ORDER — BACITRACIN 500 UNIT/G
1 OINTMENT (GRAM) TOPICAL
Refills: 0 | Status: DISCONTINUED | OUTPATIENT
Start: 2025-02-12 | End: 2025-02-12

## 2025-02-12 RX ADMIN — Medication 1 APPLICATION(S): at 05:42

## 2025-02-12 RX ADMIN — Medication 200 MILLILITER(S): at 10:49

## 2025-02-12 RX ADMIN — IPRATROPIUM BROMIDE AND ALBUTEROL SULFATE 3 MILLILITER(S): .5; 2.5 SOLUTION RESPIRATORY (INHALATION) at 23:03

## 2025-02-12 RX ADMIN — DOXAZOSIN MESYLATE 8 MILLIGRAM(S): 8 TABLET ORAL at 22:19

## 2025-02-12 RX ADMIN — LEVETIRACETAM 1500 MILLIGRAM(S): 10 INJECTION, SOLUTION INTRAVENOUS at 05:21

## 2025-02-12 RX ADMIN — OFLOXACIN 1 DROP(S): 3 SOLUTION OPHTHALMIC at 23:03

## 2025-02-12 RX ADMIN — Medication 15 MILLILITER(S): at 18:58

## 2025-02-12 RX ADMIN — Medication 1 DROP(S): at 14:33

## 2025-02-12 RX ADMIN — SODIUM ZIRCONIUM CYCLOSILICATE 5 GRAM(S): 5 POWDER, FOR SUSPENSION ORAL at 12:38

## 2025-02-12 RX ADMIN — ACETYLCYSTEINE 4 MILLILITER(S): 200 INHALANT RESPIRATORY (INHALATION) at 18:58

## 2025-02-12 RX ADMIN — ACETYLCYSTEINE 4 MILLILITER(S): 200 INHALANT RESPIRATORY (INHALATION) at 12:37

## 2025-02-12 RX ADMIN — HEPARIN SODIUM 5000 UNIT(S): 1000 INJECTION INTRAVENOUS; SUBCUTANEOUS at 14:33

## 2025-02-12 RX ADMIN — Medication 40 MILLIGRAM(S): at 12:38

## 2025-02-12 RX ADMIN — Medication 1 DROP(S): at 22:20

## 2025-02-12 RX ADMIN — Medication 1 APPLICATION(S): at 19:03

## 2025-02-12 RX ADMIN — POLYETHYLENE GLYCOL 3350 17 GRAM(S): 17 POWDER, FOR SOLUTION ORAL at 12:37

## 2025-02-12 RX ADMIN — Medication 150 MILLIGRAM(S): at 04:55

## 2025-02-12 RX ADMIN — ACETYLCYSTEINE 4 MILLILITER(S): 200 INHALANT RESPIRATORY (INHALATION) at 23:02

## 2025-02-12 RX ADMIN — AMLODIPINE BESYLATE 5 MILLIGRAM(S): 10 TABLET ORAL at 05:21

## 2025-02-12 RX ADMIN — OFLOXACIN 1 DROP(S): 3 SOLUTION OPHTHALMIC at 05:42

## 2025-02-12 RX ADMIN — HEPARIN SODIUM 5000 UNIT(S): 1000 INJECTION INTRAVENOUS; SUBCUTANEOUS at 22:19

## 2025-02-12 RX ADMIN — IPRATROPIUM BROMIDE AND ALBUTEROL SULFATE 3 MILLILITER(S): .5; 2.5 SOLUTION RESPIRATORY (INHALATION) at 12:37

## 2025-02-12 RX ADMIN — HEPARIN SODIUM 5000 UNIT(S): 1000 INJECTION INTRAVENOUS; SUBCUTANEOUS at 05:21

## 2025-02-12 RX ADMIN — FLUTICASONE PROPIONATE 1 SPRAY(S): 50 SPRAY, METERED NASAL at 05:20

## 2025-02-12 RX ADMIN — IPRATROPIUM BROMIDE AND ALBUTEROL SULFATE 3 MILLILITER(S): .5; 2.5 SOLUTION RESPIRATORY (INHALATION) at 05:21

## 2025-02-12 RX ADMIN — Medication 1 DROP(S): at 01:28

## 2025-02-12 RX ADMIN — Medication 1 DROP(S): at 10:49

## 2025-02-12 RX ADMIN — BACLOFEN 5 MILLIGRAM(S): 10 INJECTION INTRATHECAL at 18:59

## 2025-02-12 RX ADMIN — Medication 1 SPRAY(S): at 05:20

## 2025-02-12 RX ADMIN — Medication 1 DROP(S): at 05:22

## 2025-02-12 RX ADMIN — OFLOXACIN 1 DROP(S): 3 SOLUTION OPHTHALMIC at 18:59

## 2025-02-12 RX ADMIN — Medication 150 MILLIGRAM(S): at 12:38

## 2025-02-12 RX ADMIN — OFLOXACIN 1 DROP(S): 3 SOLUTION OPHTHALMIC at 12:37

## 2025-02-12 RX ADMIN — Medication 1 DROP(S): at 18:59

## 2025-02-12 RX ADMIN — ERYTHROMYCIN 1 APPLICATION(S): 5 OINTMENT OPHTHALMIC at 22:19

## 2025-02-12 RX ADMIN — Medication 1 APPLICATION(S): at 05:23

## 2025-02-12 RX ADMIN — BACLOFEN 5 MILLIGRAM(S): 10 INJECTION INTRATHECAL at 05:21

## 2025-02-12 RX ADMIN — Medication 1 SPRAY(S): at 18:58

## 2025-02-12 RX ADMIN — IPRATROPIUM BROMIDE AND ALBUTEROL SULFATE 3 MILLILITER(S): .5; 2.5 SOLUTION RESPIRATORY (INHALATION) at 18:58

## 2025-02-12 RX ADMIN — Medication 200 MILLIGRAM(S): at 19:02

## 2025-02-12 RX ADMIN — Medication 15 MILLILITER(S): at 05:20

## 2025-02-12 RX ADMIN — FLUTICASONE PROPIONATE 1 SPRAY(S): 50 SPRAY, METERED NASAL at 18:58

## 2025-02-12 RX ADMIN — ACETYLCYSTEINE 4 MILLILITER(S): 200 INHALANT RESPIRATORY (INHALATION) at 05:21

## 2025-02-12 RX ADMIN — LEVETIRACETAM 1500 MILLIGRAM(S): 10 INJECTION, SOLUTION INTRAVENOUS at 18:58

## 2025-02-12 NOTE — ADVANCED PRACTICE NURSE CONSULT - RECOMMEDATIONS
Bedside RNs to continue to provide ongoing hand and foot care; cleanse with warm soapy water and dry. Apply Atrac-Tain moisturizer. Apply daily.    If no improvement, consider adding an antifungal such as clotrimazole 1% twice a day in addition to the Atrac-Tain.    WO nurse signing off. Please reconsult if no improvement or new needs arise.    Total time spent on this care encounter: 35 minutes
Apply Cavilon barrier wipes to skin tear on penile shaft prior to placing new condom catheter. Spoke with MISTY Eisenberg. Total time spent on encounter=15 minutes.

## 2025-02-12 NOTE — ADVANCED PRACTICE NURSE CONSULT - ASSESSMENT
Unknown age male, unknown past medical history (reported stroke and MI by coworkers) presented to [facility] with AMS, Pt was working at [employer] when he was found down by coworkers. EMS called and pt brought to [facility] ED. Intubated, sedated, started on cardene for SBPs in 200s. CT head showed brain stem bleed. Transferred to NSICU for further management.  Small skin tear noted to dorsum and posterior aspect of penile shaft. RN Faina to use alternative condom catheter and apply Cavilon skin barrier wipe before placing appliance.

## 2025-02-12 NOTE — PROGRESS NOTE ADULT - SUBJECTIVE AND OBJECTIVE BOX
OVERNIGHT EVENTS:  NAEO per nursing staff , no events of Telemetry     SUBJECTIVE : patient does not talk , he was awake and resting in bed     PHYSICAL EXAM:  General: NAD, on vent through trach, breathing is nonlabored  HEENT: trach collar in place  Lungs: clear to auscultation anteriorly  Heart: RRR, normal s1s2  Abdomen: soft, no distension, + BS, PEG tube in place, site is clean and dry   Extremities:  warm, wwp     VITAL SIGNS:  Vital Signs Last 24 Hrs  T(C): 36.4 (12 Feb 2025 08:48), Max: 36.8 (12 Feb 2025 04:42)  T(F): 97.5 (12 Feb 2025 08:48), Max: 98.3 (12 Feb 2025 04:42)  HR: 66 (12 Feb 2025 08:57) (66 - 72)  BP: 112/80 (12 Feb 2025 08:30) (107/67 - 116/77)  BP(mean): 92 (12 Feb 2025 08:30) (81 - 92)  RR: 14 (12 Feb 2025 08:30) (14 - 15)  SpO2: 100% (12 Feb 2025 08:57) (91% - 100%)    Parameters below as of 12 Feb 2025 08:30  Patient On (Oxygen Delivery Method): ventilator    O2 Concentration (%): 40    MEDICATIONS  (STANDING):  acetylcysteine 10%  Inhalation 4 milliLiter(s) Inhalation every 6 hours  albuterol/ipratropium for Nebulization 3 milliLiter(s) Nebulizer every 6 hours  amLODIPine   Tablet 5 milliGRAM(s) Oral daily  artificial tears (preservative free) Ophthalmic Solution 1 Drop(s) Right EYE every 4 hours  bacitracin   Ointment 1 Application(s) Topical two times a day  baclofen 5 milliGRAM(s) Oral every 12 hours  chlorhexidine 0.12% Liquid 15 milliLiter(s) Oral Mucosa every 12 hours  chlorhexidine 2% Cloths 1 Application(s) Topical <User Schedule>  doxazosin 8 milliGRAM(s) Oral at bedtime  erythromycin   Ointment 1 Application(s) Both EYES at bedtime  fluticasone propionate 50 MICROgram(s)/spray Nasal Spray 1 Spray(s) Both Nostrils two times a day  heparin   Injectable 5000 Unit(s) SubCutaneous every 8 hours  influenza   Vaccine 0.5 milliLiter(s) IntraMuscular once  levETIRAcetam 1500 milliGRAM(s) Oral every 12 hours  ofloxacin 0.3% Solution 1 Drop(s) Right EYE four times a day  pantoprazole   Suspension 40 milliGRAM(s) Oral daily  petrolatum Ophthalmic Ointment 1 Application(s) Both EYES two times a day  phenytoin   Suspension 150 milliGRAM(s) Oral <User Schedule>  phenytoin   Suspension 200 milliGRAM(s) Oral <User Schedule>  polyethylene glycol 3350 17 Gram(s) Oral daily  propranolol 5 milliGRAM(s) Oral every 12 hours  senna 2 Tablet(s) Oral at bedtime  sodium chloride 0.65% Nasal 1 Spray(s) Both Nostrils two times a day  sodium chloride 0.9% Bolus 1000 milliLiter(s) IV Bolus once  sodium zirconium cyclosilicate 5 Gram(s) Oral once    MEDICATIONS  (PRN):  acetaminophen   Oral Liquid .. 650 milliGRAM(s) Oral every 6 hours PRN Temp greater or equal to 38C (100.4F), Mild Pain (1 - 3)  LORazepam   Injectable 2 milliGRAM(s) IV Push once PRN Seizure Activity (NOTIFY PROVIDER PRIOR TO GIVING)            LABS:                                              14.0   4.92  )-----------( 221      ( 12 Feb 2025 05:30 )             44.9     02-12    133[L]  |  96  |  50[H]  ----------------------------<  98  5.5[H]   |  26  |  1.19    Ca    9.7      12 Feb 2025 05:30  Phos  4.9     02-12  Mg     2.5     02-12

## 2025-02-12 NOTE — PROGRESS NOTE ADULT - SUBJECTIVE AND OBJECTIVE BOX
HPI:  Unknown age male, unknown past medical history (reported stroke and MI by coworkers) presented to [facility] with AMS, Pt was working at [employer] when he was found down by coworkers. EMS called and pt brought to [facility] ED. Intubated, sedated, started on cardene for SBPs in 200s. CT head showed brain stem bleed. Transferred to NSICU for further management.  (30 Sep 2024 12:55)    INTERVAL EVENTS:  No acute events overnight.     HOSPITAL COURSE:  9/30: transferred from [facility]. A line placed. Versed dc'd. Cy Rader at bedside, states pt has family in [place], cannot confirm medications or PMH other than stroke and MI. 250cc bolus 3% given. LR switched to NS. hydralazine 25q8 started, 3% started, switched propofol to precedex   10/1: stability CTH done. Added labetalol, started TF. Palliative consulted. ethics consulted to determine surrogate. febrile 103, pan cx sent  10/2: BD 2, GEORGE overnight. TF resumed. Desatt'd to 80s, FiO2 inc. to 50. Fentanyl given, ABG, CXR ordered. Maxxed on precedex, started on propofol for DARIEN -4 - -5. Precedex dc'd. Duonebs, mucomyst, hypertonic added. 3% dc'd. Cardene dc'd. Start vanc/CTX. Increased labetalol 200q8. MRSA negative, dc'd vanc. ETT pulled back 2cm x 2, good positioning after confirmatory chest xray. Ethics attempting to establish HCP with family. Na 159, starting FW 250q6 for range 150-155.   10/3: BD3, GEORGE o/n, neuro stable. Na elevating, FW increased to 300q6. Dc'd bowel reg for diarrhea. vEEG started. SQH 5000q8 tonight.   10/4: BD 4, albumn bolus, incr. LR to 80 2/2 incr. in Cr, LR to 10 0cc/hr for uptrending Cr. Started 7% hypersal for 48hrs and SL atropine for inline/oral thick secretions. Dc'd CTX and started ancef for MSSA in the sputum. Nephrology consulted for CKD, f/u recs. SBP 170s, given hydralazine 10mg IVP.   10/5: BD5, o/n 10mg IVP hydralazine given for SBP 170s and started on hydralazine 25q8 via OGT. 10mg IV push labetalol for SBP > 160s. RT placed for diarrhea.   10/6: BD6, o/n FW increased to 350q4 per nephrology recs. IV tylenol for temp 100.6, SBp 160s presumed uncomfortable.   10/7: BD7, overnight pancultured for temp 101.8F.   10/8: BD8. GEORGE. Cr bumped. decreased LR to 75cc/hr. Adding simethicone ATC. incr hydralazine 50mgTID. Incr labetalol 300mgTID. Na 145, decreased FWF to 250q6. Start precedex. FENa consistent with intrinsic kidney injury. Pend repeat renal US. Retaining up to 1.3L, bladder scans q6, straight cath PRN  10/9: BD 9. GEORGE overnight. Neuro stable. abd xray for distention w non-specific gas pattern, OGT to LIWS for morning. duonebs/mucomyst to q8 for improving secretions. Changed tube feeds to Jevity 1.5 20cc/hr, low rate due to abdominal distention, nepro dense and more difficult to digest. Tolerating CPAP, confirmed by ABG.   10/10: BD 10. GEORGE overnight. Neuro stable. (+) gabriel for urinary retention on bladder scan. inc TF to goal rate of 40cc/hr. family leaning toward pursuing trach/PEG. 1/2 amp for FS 81.   10/11: BD 11. GEORGE overnight. Neuro stable. Trach/PEG consults placed.   10/12: BD 12. GEORGE overnight. Neuro stable. MRI brain complete.   10/13: BD 13. Increase flomax. Hold SQH after PM dose for trach tm. IVL.   10/14: BD 14. GEORGE overnight, remains on AC/VC. Gabriel placed for urinary retention. Dc'd free water.  S/p trach with pulm. NGT placed and CXR confirmed in good position.   10/15: BD 15, GEORGE ovn. resumed feeds. spiked 101, pan cx sent.   10/16: BD 16. GEORGE ovn. Lokelma 5mg for K+ 5.4. Started vanc q 24/zosyn for empiric PNA coverage, IVF to 100/hr. PEG held for fever.   10/17: BD 17,  ordered serum osm and urine osm for am. Started sinemet for neurostimulation. Increased cardura to 0.8. Started FW 100q4, dc'd IVF. MRSA negative, dc'd vanc. NGT replaced d/t coiling.   10/18: BD 18, GEORGE overnight, neuro stable. Amantadine added for neurostim. zosyn changed to unasyn for acinetobacter baumannii, failed TOV and required SC  10/19: BD 19, GEORGE ovn. cardura 2mg added for retention. labetalol decreased 200q8, hydralazine decreased 25q8. Gabriel replaced.   10/20: BD20, GEORGE overnight. NGT dislodged, replaced. PEG tomorrow w/ gen surg, FW increased to 150q4 and labetalol decreased to 100q8, lokelma given for hyperkalemia.   10/21: BD 21. POD0 PEG placement with Gen surg. decr labetolol to 50q8, incr. cardura to 0.4, started lokelma and phoslo, dc gabriel POD0 PEG placement with Gen surg.  10/22: BD 22. Plan to start TF today via PEG. dc labetalol, Following ophtho recs. Increased apnea settings - found to be in cheyne-moe respiration. CPAP 5/5.  10/23: BD 23. hydralazine d/c'd, trach collar trial today. Rectal tube placed at 6am.  10/24: BD24, o/n lokelma held due to diarrhea. Free water 100q6 resumed. dc'd tamsulosin, amantadine. Incr'd cardura to 8mg qhs. Dc'd FW. Switched jevity to nepro. gabriel placed for high urine output. Started SL atropine for oral secretions. Dc'd free water.  10/25: BD25, o/n decreased suctioning requirements to > q4hrs, GEORGE. Cr improving, cont phoslo, lokelma held at this time. Gabriel placed yest, cont. Tolerating trach collar. Given 500cc plasmalyte bolus for ANIKA. Dc'd sinemet.   10/26: BD26, o/n resumed lokelma 5mg daily and resumed 100cc free water q6hrs. Change in neuro status with new right pupillary dilation with anisocoria (right pupil 6mm fixed and left pupil 3mm briskly reactive). Given 23.4% NaCl bullet, taken for emergent CTH showing mostly resolved pontine hemorrhage, continued brainstem hypodensity likely edema d/t hemorrhage, no new hemorrhage or infarct, no herniation, mild increase in size of left lateral ventricle. Vitals remaine stable. Na goal > 140.   10/27: BD27, o/n GEORGE.Neuro stable. Pend stepdown with airway bed.   10/28: BD 28. GEORGE overnight. Neuro stable. Miralax ordered. Gabriel removed, pending TOV.  10/29: BD 29. GEORGE o/n. Given 2L NS over 8 hrs for increased BUN/Cr ratio. Gabriel placed for frequent straight cath.   10/30: BD 30.   10/31: BD 31. GEORGE overnight. Na 149, increased free water to 200q6. 1L NS for uptrending BUN.   11/1: BD 32. GEORGE overnight. Given 1L NS for dehydration. Na 146, increased FW to 250q6.   11/2: BD 33 GEORGE overnight, neuro stable, given 500cc bolus for net negative status and tachycardia   11/3: BD 34, GEORGE overnight, neuro stable. Patient remains tachycardic, EKG showing sinus tachycardia, given additional 500cc NS bolus. Febrile to 101.9F, pan cultured (without UA), CXR WNL, given tylenol.   11/4: BD 35, GEORGE overnight, neuro stable. Given 1L NS for tachycardia. sputum (+) for stenotropohomas maltophilia.   11/5: BD 36 GEORGE overnight, neuro stable. Vancomycin dc'd. Chest PT BID. ID consulted, cont zosyn.  11/6: BD 37. blood cx + klebsiella dc zosyn changed to cefepime, CTAP ordered, rpt blood cx sent.    11/7: BD 38. Pending CT A/P, given 250cc bolus and starting maintenance fluids overnight. Pending CT A/P after bolus   11/8: BD 39. CT CAP negative for infection.   11/9: BD 40. GEORGE overnight.  11/10: BD 41. GEORGE overnight. desat to 85 on trach collar, O2 inc to 10L and 100%, O2 sat inc to 95. pt tachy to 110s, euvolemic. given tylenol. ABG and CXR ordered. spiked fever, pancultured, RVP negative. AM ABG w pO2 79, rpt w pO2 79. pt appears comfortable, satting 94%.   11/11: BD 42. GEORGE overnight. pt became tachy to 130s, desat to 90 on 100% FiO2 and 10L. suctioned, (+) productive cough. temp 101.4, given 1g IV tylenol and 500cc NS bolus for euvolemia. fever and HR downtrending. LE dopplers negative for dvt  11/12: BD 43, GEORGE ovn, fever and HR downtrending, satting 97% 70% FIO2  11/13: BD 44, GEORGE ovn. started standing tylenol x24 hours for tachycardia. desat to 80s, o2 increased. CXR stable, pending CTA PE protocol.   11/14: BD 45, GEORGE overnight, neuro stable. resp therapy dec FiO2 to 70%.   11/15: BD 46, GEORGE overnight, neuro stable.  Rapid called for desaturation 30s, tachycardic 140s. Patient bagged, 100% fio2, heavily suctioned. CXR/POCUS unremarkable. ABG c/w desaturation. WBC 14.71. Afebrile. O2 improved to 90s and patient upgraded to ICU. ABG paO2 30s improved to 89 on vent. IV Tylenol x 1, sputum sent. Start protonix while o-n vent.   11/16: BD 47. POCUS showed collapsable IVCF, given 1L bolus. Vanco/Cefpime added empriic for PNA, NGT feeds restarted. MRSA swab neg, Vanc DC'd.   11/17: BD 48. GEORGE overnight. 1l bolus for tachycardia. Spiked to 101, cultured. 500cc bolus for tachycardia, tachy to 148 given 25mcg fentanyl, 250cc albumin, 1.5L bolus. 5 IV lopressor with response HR to 100s. +Stenotrophomonas on sputum cx.   11/18: BD 49. GEORGE overnight. Consulted ID, cefepime switched to bactrim x7days. Started hydrochlorothiazine 12.5mg daily.  11/19: BD 50. Tachy 120s, given tylenol and 500cc NS. Tolerating 5/5, switched to TCx. Holding phos binder. D/c Bactrim. D/c gabriel, f/u TOV. Dc'd PPI.   11/20: BD 51. GEORGE ovn. 1600 satting low 90s, mildly tachy to 110s, afebrile, RR wnl. O2 improved to mid 90s while inc O2 to 100% on TCx. CPAP 5/5 placed back on.  11/21: BD 52, GEORGE ovn, tolerating CPAP 5/5. Switch to trach collar during the day if tolerating well. HCTZ held for Cr bump, straight cath frequence increased to q4  11/22: BD 53, GEORGE ovn. Resumed phoslo. Gabriel placed. Resumed HCTZ.   11/23: BD 54. Holding tylenol in setting of possible fever, will require pan cx if febrile. Cr improved today. Cont CPAP. Bowel regimen held i/s/o diarrhea. FOBT negative.  11/24: BD 55. GEORGE overnight. Neuro stable. HCTZ dc'ed, started lisinopril 5. Lokelma dc'ed for K 3.7.   11/25: BD 56, GEORGE overnight. Neuro stable. dc'd lisinopril 5mg. Gabriel dc'd. TOV. 1545 noted to be hypotensive, MAP 50, in supine position on chair, HR 60s, afebrile, O2 96%. Given 1L cc NS bolus, placed back on bed in reverse trendelenberg, improved to map of 66. Neostick at bedside. Vitals check q1h. Dc'ed amlodipine. Failed TOV, bladder scan q6, sc prn. Added back Senna.   11/26: BD 57, GEORGE overnight. Neuro stable. Dc'd phoslo.   11/27: BD 58, GEORGE overnight. Neuro stable.    11/28: BD 59. Gabriel replaced.   11/29: GEORGE.  11/30: GEORGE, neuro stable.   12/1: BD 62, GEORGE overnight  12/2: BD 63, GEORGE overnight.; Given 1L bolus of LR for uptrending BUN/Cr.  12/3: BD 64. Reinstated eye gtt/moisture chamber given increased Rt eye injection  12/4: BD 65. GEORGE overnight. Attempted to speak with ophtho regarding eyelid weight/closure but no answer, full mailbox.   12/5: BD 66, GEORGE overnight, bowel regimen increased and had BM.   12/6: BD 67, GEORGE overnight, neuro stable.  12/7: GEORGE overnight, neuro stable. /110s, given x1 hydralazine 10 mg IVP. Restarted home amlodipine 5mg.  12/8: GEORGE. OOB to chair.     12/11: GEORGE, mucomyst added for thick secretions, simethicone for abd distension, abd xray with stool burden, increased bowel regimen.   12/12: GEORGE overnight.   12/13: GEORGE overnight.   12/14: GEORGE overnight  12/15: o/n Patient became tachycardic HR 120s and 10 minutes later O2 sat dropped as low as 89%. Patient suctioned without improvement in O2 sat and tube feeds found in suction catheter. TFs held.  STAT CXR ordered. STAT labs sent. Respiratory therapist called to bedside and patient trach connected to ventilator. After connection to ventilator and further suctioning O2 sat improved to 97% but patient HR remains 120-130s. Upgraded to NSICU for further management. Vancomycin and zosyn started. CTA  chest PE protocol and CTH ordered. Blood cultures sent.given 500cc bolus, rpt ABG sent pO2 243, CTH and CTA chest done. FS while NPO, FiO2 dec 50 pending ABG. sputum cx positive for few GPC and GNR.   12/16: GEORGE overnight, restarted amlodipine, troponin 75   12/17: GEORGE overnight, neuro stable. Attempted CPAP this morning, did not tolerate and back on full vent support. Dc'd Vanc. Tachycardia to 140s, noted extremities to be twitching along with jaw twitching. Given 2g of Keppra, total 4mg ativan and placed on EEG, full set of labs and lactate negative. Resumed trickle feeds at 20cc/hr. Given 250cc albumin for tachycardia.   12/18: GEORGE overnight. Neuro stable. CTH stable. EEG negative and dc'd.   12/19: GEORGE overnight. neuro stable. SIMV most of day, AC/VC at night.   12/20: GEORGE overnight, neuro stable. remains on VC/AC  12/21: GEORGE ovn. 1L bolus for tachy to 120s-130s.   12/22: GEORGE ovn. Tachy to 120s, given tylenol and IVF. 2mg IV ativan given for L jaw twitching. Sx resolved for 3 minutes and started again. Epilepsy contacted. Given 2mg IV ativan. Continued twitching. Increased keppra to 1500mg BID, 2mg ativan given. Propranolol started for refractory tachycardia. vEEG ordered. albumin given. POCUS performed, no b lines. Started on fosphenytoin, loaded w 20mg/kg then 100mg TID. febrile, pancx, started cefepime 2g q8, vancomycin  12/23: GEORGE ovn. +focal motor seizures on eeg. ID consulted. Vancomycin dc'ed as per ID.  12/24: GEORGE ovn, neuro stable. Baclofen 5 mg q12 started for hiccups. Na 129 from 134. Urine lytes c/w SIADH. Given 250cc 3% bolus. CT chest for infection w/u - f/u read. Repeat Na 136. UA negative  12/25: GEORGE ovn.   12/26: GEORGE ovn  12/27: GEORGE overnight. Blood cx neg @ 4 days, DC cefepime per medicine (sputum colonized).   12/28: Desaturation o/n to 80's, CXR obtained, pulse ox changed and sats resolved. Na 133 from 138, 250cc 3% bolus given. Cefepime resumed until 12/30 per ID. Rpt Na 138.  12/29: GEORGE overnight. Na stable.   12/30: GEORGE overnight. Family meeting with son, pt now DNR.   12/31: GEORGE overnight.   1/1: GEORGE overnight, neuro stable.  1/2: GEORGE overnight, neuro stable. FW decreased to 100q6.   1/3: GEORGE overnight, neuro stable. fosphenytoin IV changed to pheytoin PO via PEG per epilepsy recommendations  1/4: GEORGE overnight, neuro stable. FW incr. to 100q4  1/5: GEORGE overnight, neuro stable.   1/6: GEORGE overnight, neuro stable   1/7: GEORGE overnight, neuro stable. BUN/Cr increased, increased FW to 200q6. Given 1L bolus of LR over 2 hours. Gabriel d/c'd, voiding, continue bladder scan q6.   1/8: GEORGE overnight, neuro stable. BUN/Cr improving.  1/9: GEORGE overnight, neuro stable.   1/10: GEORGE overnight, neuro stable.   1/11: GEORGE overnight, neuro stable, vent settings stable.   1/12: GEORGE overnight, neuro stable. Febrile to 102F, f/u pan cx, chest xray, given tylenol. Zosyn started.   1/13: GEORGE overnight, neuro stable, cont Zosyn for presumed PNA, prelim sputum cx growing; few GS neg diplococci, mod GS neg rods, rare GS + rods, fever trend improved. Free water increased to 048gas8.   1/14: GEORGE overnight. pending renal US for elevated Cr  1/15: GEORGE ovn. renal US complete.   1/16: GEORGE overnight, neuro stable   1/17: GEORGE overnight, neuro stable   1/18: GEORGE overnight, neuro stable.   1/19: GEORGE overnight, neuro stable. Propranolol dec to 5q8.   1/20: GEORGE overnight, neuro stable. Weaned propranolol to 5mg BID.   1/21: GEORGE ovn, in AM having rapid vertical eye movements with facial twitching, 2g total keppra given for AM dose and phenytoin level ordered, vital signs stable. CTH stable. Phenytoin increased to 100/100/150mg per epilepsy  1/22: GEORGE ovn. IV hydral x 1 for SBP 170s.   1/23: Cont to have focal motor seizures. Per epilepsy, changed phenytoin dose to 100/100/200.   1/24: Phenytoin lvl tmrw AM. Chest Xray completed due to temp 100.2F  1/25: GEORGE overnight. Incr dilantin morning and afternoon per epilepsy.   1/26: GEORGE overnight. Sustained focal twitching noticed by nursing. Ativan 8mg in total given. Fosphenytoin 1500mg IV given. Placed on EEG. Epilepsy following. TFs held. Phenytoin increased to 150/150/200.   1/27: o/n CTH completed due to continued lethargy after ativan given yesterday afternoon. TFs resumed. 500cc bolus NS given for SBP 90s. EEG dc'ed, discussed w/ Dr. Tomlin. Febrile 101F, pan cx sent. Likely left sided infiltrate on CXR, c/f aspiration PNA. Started on vanc/zosyn. MRSA swab pending.   1/28: Neuro stable, on vanc/zosyn. +UTI on UA, MRSA negative. Vanc dc'd. RVP negative. Zosyn increased to pseudomonal dosing.   1/29: GEORGE overnight, neuro stable.  1/30: GEORGE overnight, neuro stable. Dc'd zosyn due to resistance, switched to Levaquin.  1/31: GEORGE ovenight, neuro stable.   2/1: Brief desat. Improved with neb and reposititioning. ABG and POCUS wnl.   2/4: GEORGE overnight  2/5: GEORGE overnight  2/6: GEORGE ovn  2/7: GEORGE ovn. L eye redness noted, started erythromycin and lacrilube to L eye as well. LR @ 100 cc/hr x 10 hours for uptrending BUN/Cr. Resumed bowel reg.   2/8: GEORGE overnight. Escalated bowel regimen.   2/9: GEORGE overnight.  2/10: GEORGE overnight. Social work to start working on SNF placement. Pending appointment with [employer] to come to hospital for biometrics.  2/11: GEORGE overnight. Neuro stable. Potassium improved (downtrending).   2/12: GEORGE overnight. Neuro stable.   Vital Signs Last 24 Hrs  T(C): 36.4 (11 Feb 2025 21:30), Max: 37.2 (11 Feb 2025 04:55)  T(F): 97.5 (11 Feb 2025 21:30), Max: 98.9 (11 Feb 2025 04:55)  HR: 70 (11 Feb 2025 20:55) (68 - 92)  BP: 116/77 (11 Feb 2025 20:55) (103/68 - 120/86)  BP(mean): 92 (11 Feb 2025 20:55) (80 - 99)  RR: 15 (11 Feb 2025 20:55) (14 - 17)  SpO2: 96% (11 Feb 2025 20:55) (91% - 99%)  Parameters below as of 11 Feb 2025 20:55  Patient On (Oxygen Delivery Method): ventilator  O2 Concentration (%): 40    I&O's Summary  10 Feb 2025 07:01  -  11 Feb 2025 07:00  --------------------------------------------------------  IN: 3144 mL / OUT: 2050 mL / NET: 1094 mL    11 Feb 2025 07:01  -  12 Feb 2025 00:00  --------------------------------------------------------  IN: 1276 mL / OUT: 1025 mL / NET: 251 mL    PHYSICAL EXAM:  General: patient seen laying supine in bed in NAD, [place]ic speaking  Neuro: opens eyes spontaneously, A+Ox3 with choices, tracks vertically, RUE squeezes hand to command, RLE wiggles toes to command, LUE 0/5, b/l lower extremities withdraw to noxious   HEENT: PERRL, vertical EOMI, left conjunctival injection without drainage or discharge   Neck: supple  Cardiac: RRR   Pulmonary: chest rise symmetric, nonlabored breathing, +trach  Abdomen: soft, nontender, nondistended, +PEG  Ext: perfusing well  Skin: warm, dry    TUBES/LINES:  [] Gabriel  [X] Trach  [] NGT  [X] PEG  [] Wound Drains  [] Others    DIET:  [] NPO  [] Mechanical  [X] Tube feeds    LABS:             10.8   6.23  )-----------( 165      ( 11 Feb 2025 12:00 )             33.6     133[L]  |  96  |  49[H]  ----------------------------<  95  5.3   |  23  |  1.18    Ca    9.6      11 Feb 2025 12:00  Phos  4.7     02-11  Mg     2.4     02-11    Urinalysis Basic - ( 11 Feb 2025 12:00 )  Color: x / Appearance: x / SG: x / pH: x  Gluc: 95 mg/dL / Ketone: x  / Bili: x / Urobili: x   Blood: x / Protein: x / Nitrite: x   Leuk Esterase: x / RBC: x / WBC x   Sq Epi: x / Non Sq Epi: x / Bacteria: x    Drug Levels: [X] N/A    CSF Analysis: [X] N/A    Allergies  Allergy Status Unknown    MEDICATIONS:  Antibiotics:    Neuro:  acetaminophen   Oral Liquid .. 650 milliGRAM(s) Oral every 6 hours PRN  baclofen 5 milliGRAM(s) Oral every 12 hours  levETIRAcetam 1500 milliGRAM(s) Oral every 12 hours  LORazepam   Injectable 2 milliGRAM(s) IV Push once PRN  phenytoin   Suspension 150 milliGRAM(s) Oral <User Schedule>  phenytoin   Suspension 200 milliGRAM(s) Oral <User Schedule>    Anticoagulation:  heparin   Injectable 5000 Unit(s) SubCutaneous every 8 hours    OTHER:  acetylcysteine 10%  Inhalation 4 milliLiter(s) Inhalation every 6 hours  albuterol/ipratropium for Nebulization 3 milliLiter(s) Nebulizer every 6 hours  amLODIPine   Tablet 5 milliGRAM(s) Oral daily  artificial tears (preservative free) Ophthalmic Solution 1 Drop(s) Right EYE every 4 hours  chlorhexidine 0.12% Liquid 15 milliLiter(s) Oral Mucosa every 12 hours  chlorhexidine 2% Cloths 1 Application(s) Topical <User Schedule>  doxazosin 8 milliGRAM(s) Oral at bedtime  erythromycin   Ointment 1 Application(s) Both EYES at bedtime  fluticasone propionate 50 MICROgram(s)/spray Nasal Spray 1 Spray(s) Both Nostrils two times a day  influenza   Vaccine 0.5 milliLiter(s) IntraMuscular once  ofloxacin 0.3% Solution 1 Drop(s) Right EYE four times a day  pantoprazole   Suspension 40 milliGRAM(s) Oral daily  petrolatum Ophthalmic Ointment 1 Application(s) Both EYES two times a day  polyethylene glycol 3350 17 Gram(s) Oral daily  propranolol 5 milliGRAM(s) Oral every 12 hours  senna 2 Tablet(s) Oral at bedtime  sodium chloride 0.65% Nasal 1 Spray(s) Both Nostrils two times a day    CULTURES:  Culture Results:   No growth at 5 days (02-01 @ 19:45)    ASSESSMENT:  44M PMH ?stroke/MI present to [facility] after collapsing at work. Decorticate posturing, vomiting, intubated for airway protection. 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 Transferred to [facility] for further management. s/p trach 10/14. s/p peg 10/21. Re-upgrade to ICU 2/2 desaturation event and suctioning requirements 11/15. Re-upgrade to NSICU 12/15 2/2 desaturation and tachycardia.    PLAN:  Neuro:  - neuro/vitals q4h  - pain control: tylenol prn  - seizure tx: keppra 1500mg BID, phenytoin 150/150/200  - s/p vEEG (10/3-4), (10/17-10/19), (12/17-12/18), (12/22-12/25), (1/26-1/28),  +focal motor seizures not correlating w/ EEG  - CTH 9/30: enlarged pontine hemorrhage, CTH 10/3: stable, CTH 10/25: mostly resolved pontine hemorrhage, CTH 12/15: R mastoid air cell opacification; acute otitis media vs sterile effusion, CTH 12/18: stable. CTH 1/21 stable, CTH 1/27 stable  - MRI brain 10/12: parenchymal hemorrhage, acute/subacute R cerebellar stroke      CV:  - -160  - tachycardia: propranolol 5mg BID   - HTN: amlodipine 5mg  - echo (9/30) EF 75%, repeat 12/16: 57%    Resp:  - trach to vent, AC/VC 40/400/14/6  - Secretions: duonebs/mucomyst/chest PT q6h     GI:  - TFs via PEG  - bowel regimen, last BM 2/9  - baclofen 5q12 for hiccups    Renal:  - IVL  - FW 200cc q4h for hydration  - Voiding, SC prn  - CKD: trend BUN/Cr  - renal US 10/1, 10/8, 1/15: Increased bilateral renal echogenicity consistent with medical renal disease    Endo:  - A1c 5.4    Heme:  - DVT ppx: SCDs, SQH 5000u q8h   - LE dopplers negative 12/16    ID:  - last pan cx 1/27  - +klebsiella UTI s/p levaquin (1/30-2/3)  - empiric PNA: cefepime (12/22-12/30), s/p vanc (12/22-12/23), s/p zosyn (12/15-12/20), s/p vanc (12/15-12/16), s/p zosyn (1/12 -1/18)  - s/p Stenotrophomonas maltophilia PNA: s/p Bactrim (11/18-11/19) s/p Cefepime (11/16-11/18)  - 11/3, (+) sputum for stenotrophomas maltophlia, blood cx (+) klebsiella, cefepime 2gq12 (11/6 - 11/12)   - S/p Ancef (10/4-10/14) for PNA, and s/p Unasyn (10/18-10/23) +actinobacter baumanii     MISC:  - ophtho consult for keratitis  - erythromycin ointment b/l eye q4hrs, ofloxacin ointment R eye QID, artificial tears R eye q2hrs, moisture chamber at bedtime    Dispo: SDU status, DNR, pending PRUCOL for benefits     D/w Dr. D'Amico

## 2025-02-13 LAB
ANION GAP SERPL CALC-SCNC: 12 MMOL/L — SIGNIFICANT CHANGE UP (ref 5–17)
BUN SERPL-MCNC: 46 MG/DL — HIGH (ref 7–23)
CALCIUM SERPL-MCNC: 9.6 MG/DL — SIGNIFICANT CHANGE UP (ref 8.4–10.5)
CHLORIDE SERPL-SCNC: 98 MMOL/L — SIGNIFICANT CHANGE UP (ref 96–108)
CO2 SERPL-SCNC: 25 MMOL/L — SIGNIFICANT CHANGE UP (ref 22–31)
CREAT SERPL-MCNC: 1.11 MG/DL — SIGNIFICANT CHANGE UP (ref 0.5–1.3)
EGFR: 83 ML/MIN/1.73M2 — SIGNIFICANT CHANGE UP
EGFR: 83 ML/MIN/1.73M2 — SIGNIFICANT CHANGE UP
GLUCOSE SERPL-MCNC: 115 MG/DL — HIGH (ref 70–99)
HCT VFR BLD CALC: 36.9 % — LOW (ref 39–50)
HGB BLD-MCNC: 11.3 G/DL — LOW (ref 13–17)
MAGNESIUM SERPL-MCNC: 2.4 MG/DL — SIGNIFICANT CHANGE UP (ref 1.6–2.6)
MCHC RBC-ENTMCNC: 30 PG — SIGNIFICANT CHANGE UP (ref 27–34)
MCHC RBC-ENTMCNC: 30.6 G/DL — LOW (ref 32–36)
MCV RBC AUTO: 97.9 FL — SIGNIFICANT CHANGE UP (ref 80–100)
NRBC BLD AUTO-RTO: 0 /100 WBCS — SIGNIFICANT CHANGE UP (ref 0–0)
PHOSPHATE SERPL-MCNC: 4.5 MG/DL — SIGNIFICANT CHANGE UP (ref 2.5–4.5)
PLATELET # BLD AUTO: 249 K/UL — SIGNIFICANT CHANGE UP (ref 150–400)
POTASSIUM SERPL-MCNC: 5.1 MMOL/L — SIGNIFICANT CHANGE UP (ref 3.5–5.3)
POTASSIUM SERPL-SCNC: 5.1 MMOL/L — SIGNIFICANT CHANGE UP (ref 3.5–5.3)
RBC # BLD: 3.77 M/UL — LOW (ref 4.2–5.8)
RBC # FLD: 12.7 % — SIGNIFICANT CHANGE UP (ref 10.3–14.5)
SODIUM SERPL-SCNC: 135 MMOL/L — SIGNIFICANT CHANGE UP (ref 135–145)
WBC # BLD: 7.5 K/UL — SIGNIFICANT CHANGE UP (ref 3.8–10.5)
WBC # FLD AUTO: 7.5 K/UL — SIGNIFICANT CHANGE UP (ref 3.8–10.5)

## 2025-02-13 PROCEDURE — 99223 1ST HOSP IP/OBS HIGH 75: CPT | Mod: GC,25

## 2025-02-13 PROCEDURE — 31622 DX BRONCHOSCOPE/WASH: CPT | Mod: GC

## 2025-02-13 PROCEDURE — 99233 SBSQ HOSP IP/OBS HIGH 50: CPT

## 2025-02-13 PROCEDURE — 99232 SBSQ HOSP IP/OBS MODERATE 35: CPT

## 2025-02-13 RX ORDER — FENTANYL CITRATE-0.9 % NACL/PF 100MCG/2ML
50 SYRINGE (ML) INTRAVENOUS ONCE
Refills: 0 | Status: DISCONTINUED | OUTPATIENT
Start: 2025-02-13 | End: 2025-02-13

## 2025-02-13 RX ORDER — ERYTHROMYCIN 5 MG/G
1 OINTMENT OPHTHALMIC
Refills: 0 | Status: DISCONTINUED | OUTPATIENT
Start: 2025-02-13 | End: 2025-02-20

## 2025-02-13 RX ORDER — HYPROMELLOSE 0.4 %
1 DROPS OPHTHALMIC (EYE)
Refills: 0 | Status: DISCONTINUED | OUTPATIENT
Start: 2025-02-13 | End: 2025-02-20

## 2025-02-13 RX ADMIN — BACLOFEN 5 MILLIGRAM(S): 10 INJECTION INTRATHECAL at 05:26

## 2025-02-13 RX ADMIN — Medication 1 DROP(S): at 05:17

## 2025-02-13 RX ADMIN — DOXAZOSIN MESYLATE 8 MILLIGRAM(S): 8 TABLET ORAL at 23:08

## 2025-02-13 RX ADMIN — Medication 1 APPLICATION(S): at 19:41

## 2025-02-13 RX ADMIN — Medication 150 MILLIGRAM(S): at 04:23

## 2025-02-13 RX ADMIN — LEVETIRACETAM 1500 MILLIGRAM(S): 10 INJECTION, SOLUTION INTRAVENOUS at 05:24

## 2025-02-13 RX ADMIN — Medication 1 APPLICATION(S): at 23:28

## 2025-02-13 RX ADMIN — Medication 40 MILLIGRAM(S): at 14:03

## 2025-02-13 RX ADMIN — Medication 15 MILLILITER(S): at 05:18

## 2025-02-13 RX ADMIN — ACETYLCYSTEINE 4 MILLILITER(S): 200 INHALANT RESPIRATORY (INHALATION) at 23:08

## 2025-02-13 RX ADMIN — Medication 1 DROP(S): at 23:08

## 2025-02-13 RX ADMIN — FLUTICASONE PROPIONATE 1 SPRAY(S): 50 SPRAY, METERED NASAL at 19:42

## 2025-02-13 RX ADMIN — Medication 1 SPRAY(S): at 05:24

## 2025-02-13 RX ADMIN — Medication 1 APPLICATION(S): at 05:57

## 2025-02-13 RX ADMIN — Medication 1 SPRAY(S): at 19:42

## 2025-02-13 RX ADMIN — Medication 200 MILLIGRAM(S): at 19:43

## 2025-02-13 RX ADMIN — ACETYLCYSTEINE 4 MILLILITER(S): 200 INHALANT RESPIRATORY (INHALATION) at 05:18

## 2025-02-13 RX ADMIN — Medication 50 MICROGRAM(S): at 18:54

## 2025-02-13 RX ADMIN — LEVETIRACETAM 1500 MILLIGRAM(S): 10 INJECTION, SOLUTION INTRAVENOUS at 19:41

## 2025-02-13 RX ADMIN — Medication 15 MILLILITER(S): at 19:42

## 2025-02-13 RX ADMIN — OFLOXACIN 1 DROP(S): 3 SOLUTION OPHTHALMIC at 05:25

## 2025-02-13 RX ADMIN — Medication 1 DROP(S): at 13:43

## 2025-02-13 RX ADMIN — IPRATROPIUM BROMIDE AND ALBUTEROL SULFATE 3 MILLILITER(S): .5; 2.5 SOLUTION RESPIRATORY (INHALATION) at 05:19

## 2025-02-13 RX ADMIN — Medication 1 DROP(S): at 10:00

## 2025-02-13 RX ADMIN — ACETYLCYSTEINE 4 MILLILITER(S): 200 INHALANT RESPIRATORY (INHALATION) at 19:40

## 2025-02-13 RX ADMIN — Medication 1 APPLICATION(S): at 05:18

## 2025-02-13 RX ADMIN — ERYTHROMYCIN 1 APPLICATION(S): 5 OINTMENT OPHTHALMIC at 14:02

## 2025-02-13 RX ADMIN — ACETYLCYSTEINE 4 MILLILITER(S): 200 INHALANT RESPIRATORY (INHALATION) at 14:03

## 2025-02-13 RX ADMIN — IPRATROPIUM BROMIDE AND ALBUTEROL SULFATE 3 MILLILITER(S): .5; 2.5 SOLUTION RESPIRATORY (INHALATION) at 14:02

## 2025-02-13 RX ADMIN — Medication 150 MILLIGRAM(S): at 14:03

## 2025-02-13 RX ADMIN — IPRATROPIUM BROMIDE AND ALBUTEROL SULFATE 3 MILLILITER(S): .5; 2.5 SOLUTION RESPIRATORY (INHALATION) at 19:42

## 2025-02-13 RX ADMIN — HEPARIN SODIUM 5000 UNIT(S): 1000 INJECTION INTRAVENOUS; SUBCUTANEOUS at 23:08

## 2025-02-13 RX ADMIN — HEPARIN SODIUM 5000 UNIT(S): 1000 INJECTION INTRAVENOUS; SUBCUTANEOUS at 05:23

## 2025-02-13 RX ADMIN — AMLODIPINE BESYLATE 5 MILLIGRAM(S): 10 TABLET ORAL at 05:21

## 2025-02-13 RX ADMIN — Medication 1 APPLICATION(S): at 14:03

## 2025-02-13 RX ADMIN — BACLOFEN 5 MILLIGRAM(S): 10 INJECTION INTRATHECAL at 19:41

## 2025-02-13 RX ADMIN — ERYTHROMYCIN 1 APPLICATION(S): 5 OINTMENT OPHTHALMIC at 19:42

## 2025-02-13 RX ADMIN — IPRATROPIUM BROMIDE AND ALBUTEROL SULFATE 3 MILLILITER(S): .5; 2.5 SOLUTION RESPIRATORY (INHALATION) at 23:09

## 2025-02-13 RX ADMIN — FLUTICASONE PROPIONATE 1 SPRAY(S): 50 SPRAY, METERED NASAL at 05:25

## 2025-02-13 RX ADMIN — ERYTHROMYCIN 1 APPLICATION(S): 5 OINTMENT OPHTHALMIC at 23:09

## 2025-02-13 RX ADMIN — HEPARIN SODIUM 5000 UNIT(S): 1000 INJECTION INTRAVENOUS; SUBCUTANEOUS at 14:03

## 2025-02-13 RX ADMIN — Medication 1000 MILLILITER(S): at 19:59

## 2025-02-13 RX ADMIN — Medication 1 DROP(S): at 01:25

## 2025-02-13 RX ADMIN — Medication 1 DROP(S): at 19:15

## 2025-02-13 NOTE — PROGRESS NOTE ADULT - ASSESSMENT
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    Acute hypoxic respiratory failure  Brain stem stroke  Acute encephalopathy    Continues to be ventilator dependent. He still has considerable secretions, not able to clear on his own, and is being frequently suctioned.   His tracheostomy tube is due to be exchanged, per  directions.     Plan for bronchoscopy and tracheostomy tube exchange.   Not a candidate for downsizing or decannulation as patient still requires ventilator support.

## 2025-02-13 NOTE — PROGRESS NOTE ADULT - SUBJECTIVE AND OBJECTIVE BOX
Patient is a 44y old  Male who presents with a chief complaint of brain stem bleed (13 Feb 2025 15:03)    INTERVAL EVENTS:  awake,     does not talk   squeezes examiner's fingers on command     SUBJECTIVE:  Patient was seen and examined at bedside.  Review of systems: No CP, dyspnea, nausea or vomiting, dysuria, LE edema.     Diet, NPO with Tube Feed:   Tube Feeding Modality: Gastrostomy  Maribeth MonoSphere Renal  Total Volume for 24 Hours (mL): 1062  Total Number of Cans: 5  Continuous  Starting Tube Feed Rate mL per Hour: 59  Increase Tube Feed Rate by (mL): 0     Every 4 hours  Until Goal Tube Feed Rate (mL per Hour): 59  Tube Feed Duration (in Hours): 18  Tube Feed Start Time: 11:00  Tube Feed Stop Time: 05:00 (02-12-25 @ 16:32) [Active]      MEDICATIONS:  MEDICATIONS  (STANDING):  acetylcysteine 10%  Inhalation 4 milliLiter(s) Inhalation every 6 hours  albuterol/ipratropium for Nebulization 3 milliLiter(s) Nebulizer every 6 hours  amLODIPine   Tablet 5 milliGRAM(s) Oral daily  artificial tears (preservative free) Ophthalmic Solution 1 Drop(s) Right EYE every 4 hours  baclofen 5 milliGRAM(s) Oral every 12 hours  chlorhexidine 0.12% Liquid 15 milliLiter(s) Oral Mucosa every 12 hours  chlorhexidine 2% Cloths 1 Application(s) Topical <User Schedule>  doxazosin 8 milliGRAM(s) Oral at bedtime  erythromycin   Ointment 1 Application(s) Both EYES four times a day  fluticasone propionate 50 MICROgram(s)/spray Nasal Spray 1 Spray(s) Both Nostrils two times a day  heparin   Injectable 5000 Unit(s) SubCutaneous every 8 hours  influenza   Vaccine 0.5 milliLiter(s) IntraMuscular once  levETIRAcetam 1500 milliGRAM(s) Oral every 12 hours  pantoprazole   Suspension 40 milliGRAM(s) Oral daily  petrolatum Ophthalmic Ointment 1 Application(s) Both EYES four times a day  phenytoin   Suspension 150 milliGRAM(s) Oral <User Schedule>  phenytoin   Suspension 200 milliGRAM(s) Oral <User Schedule>  polyethylene glycol 3350 17 Gram(s) Oral daily  propranolol 5 milliGRAM(s) Oral every 12 hours  senna 2 Tablet(s) Oral at bedtime  sodium chloride 0.65% Nasal 1 Spray(s) Both Nostrils two times a day    MEDICATIONS  (PRN):  acetaminophen   Oral Liquid .. 650 milliGRAM(s) Oral every 6 hours PRN Temp greater or equal to 38C (100.4F), Mild Pain (1 - 3)  LORazepam   Injectable 2 milliGRAM(s) IV Push once PRN Seizure Activity (NOTIFY PROVIDER PRIOR TO GIVING)      Allergies    Allergy Status Unknown    Intolerances        OBJECTIVE:  Vital Signs Last 24 Hrs  T(C): 36.8 (13 Feb 2025 17:37), Max: 36.9 (13 Feb 2025 04:39)  T(F): 98.3 (13 Feb 2025 17:37), Max: 98.5 (13 Feb 2025 04:39)  HR: 86 (13 Feb 2025 19:14) (64 - 98)  BP: 109/70 (13 Feb 2025 19:14) (108/73 - 136/96)  BP(mean): 84 (13 Feb 2025 19:14) (84 - 112)  RR: 18 (13 Feb 2025 19:14) (14 - 19)  SpO2: 95% (13 Feb 2025 19:14) (95% - 100%)    Parameters below as of 13 Feb 2025 19:14  Patient On (Oxygen Delivery Method): ventilator    O2 Concentration (%): 40  I&O's Summary    12 Feb 2025 07:01  -  13 Feb 2025 07:00  --------------------------------------------------------  IN: 1869 mL / OUT: 1830 mL / NET: 39 mL    13 Feb 2025 07:01  -  13 Feb 2025 19:52  --------------------------------------------------------  IN: 1731 mL / OUT: 1350 mL / NET: 381 mL        PHYSICAL EXAM:  Gen: Reclining in bed at time of exam,   HEENT: trach in place   CV: RRR, +S1/S2  Pulm: adequate respiratory effort, no increase in work of breathing  Abd: soft, ND  Skin: warm and dry,   Neuro: awake, does not talke, squeezes examiner's fingers on command.     LABS:                        11.3   7.50  )-----------( 249      ( 13 Feb 2025 05:30 )             36.9     02-13    135  |  98  |  46[H]  ----------------------------<  115[H]  5.1   |  25  |  1.11    Ca    9.6      13 Feb 2025 05:30  Phos  4.5     02-13  Mg     2.4     02-13          CAPILLARY BLOOD GLUCOSE        Urinalysis Basic - ( 13 Feb 2025 05:30 )    Color: x / Appearance: x / SG: x / pH: x  Gluc: 115 mg/dL / Ketone: x  / Bili: x / Urobili: x   Blood: x / Protein: x / Nitrite: x   Leuk Esterase: x / RBC: x / WBC x   Sq Epi: x / Non Sq Epi: x / Bacteria: x        MICRODATA:      RADIOLOGY/OTHER STUDIES:   Patient is a 44y old  Male who presents with a chief complaint of brain stem bleed (13 Feb 2025 15:03)    INTERVAL EVENTS:  awake,     does not talk   squeezes examiner's fingers on command     SUBJECTIVE:  Patient was seen and examined at bedside.  unable to perform review of systems 2/2 mental status    Diet, NPO with Tube Feed:   Tube Feeding Modality: Gastrostomy  Maribeth Sauceda Renal  Total Volume for 24 Hours (mL): 1062  Total Number of Cans: 5  Continuous  Starting Tube Feed Rate mL per Hour: 59  Increase Tube Feed Rate by (mL): 0     Every 4 hours  Until Goal Tube Feed Rate (mL per Hour): 59  Tube Feed Duration (in Hours): 18  Tube Feed Start Time: 11:00  Tube Feed Stop Time: 05:00 (02-12-25 @ 16:32) [Active]      MEDICATIONS:  MEDICATIONS  (STANDING):  acetylcysteine 10%  Inhalation 4 milliLiter(s) Inhalation every 6 hours  albuterol/ipratropium for Nebulization 3 milliLiter(s) Nebulizer every 6 hours  amLODIPine   Tablet 5 milliGRAM(s) Oral daily  artificial tears (preservative free) Ophthalmic Solution 1 Drop(s) Right EYE every 4 hours  baclofen 5 milliGRAM(s) Oral every 12 hours  chlorhexidine 0.12% Liquid 15 milliLiter(s) Oral Mucosa every 12 hours  chlorhexidine 2% Cloths 1 Application(s) Topical <User Schedule>  doxazosin 8 milliGRAM(s) Oral at bedtime  erythromycin   Ointment 1 Application(s) Both EYES four times a day  fluticasone propionate 50 MICROgram(s)/spray Nasal Spray 1 Spray(s) Both Nostrils two times a day  heparin   Injectable 5000 Unit(s) SubCutaneous every 8 hours  influenza   Vaccine 0.5 milliLiter(s) IntraMuscular once  levETIRAcetam 1500 milliGRAM(s) Oral every 12 hours  pantoprazole   Suspension 40 milliGRAM(s) Oral daily  petrolatum Ophthalmic Ointment 1 Application(s) Both EYES four times a day  phenytoin   Suspension 150 milliGRAM(s) Oral <User Schedule>  phenytoin   Suspension 200 milliGRAM(s) Oral <User Schedule>  polyethylene glycol 3350 17 Gram(s) Oral daily  propranolol 5 milliGRAM(s) Oral every 12 hours  senna 2 Tablet(s) Oral at bedtime  sodium chloride 0.65% Nasal 1 Spray(s) Both Nostrils two times a day    MEDICATIONS  (PRN):  acetaminophen   Oral Liquid .. 650 milliGRAM(s) Oral every 6 hours PRN Temp greater or equal to 38C (100.4F), Mild Pain (1 - 3)  LORazepam   Injectable 2 milliGRAM(s) IV Push once PRN Seizure Activity (NOTIFY PROVIDER PRIOR TO GIVING)      Allergies    Allergy Status Unknown    Intolerances        OBJECTIVE:  Vital Signs Last 24 Hrs  T(C): 36.8 (13 Feb 2025 17:37), Max: 36.9 (13 Feb 2025 04:39)  T(F): 98.3 (13 Feb 2025 17:37), Max: 98.5 (13 Feb 2025 04:39)  HR: 86 (13 Feb 2025 19:14) (64 - 98)  BP: 109/70 (13 Feb 2025 19:14) (108/73 - 136/96)  BP(mean): 84 (13 Feb 2025 19:14) (84 - 112)  RR: 18 (13 Feb 2025 19:14) (14 - 19)  SpO2: 95% (13 Feb 2025 19:14) (95% - 100%)    Parameters below as of 13 Feb 2025 19:14  Patient On (Oxygen Delivery Method): ventilator    O2 Concentration (%): 40  I&O's Summary    12 Feb 2025 07:01  -  13 Feb 2025 07:00  --------------------------------------------------------  IN: 1869 mL / OUT: 1830 mL / NET: 39 mL    13 Feb 2025 07:01  -  13 Feb 2025 19:52  --------------------------------------------------------  IN: 1731 mL / OUT: 1350 mL / NET: 381 mL        PHYSICAL EXAM:  Gen: Reclining in bed at time of exam,   HEENT: trach in place   CV: RRR, +S1/S2  Pulm: adequate respiratory effort, no increase in work of breathing  Abd: soft, ND  Skin: warm and dry,   Neuro: awake, does not talke, squeezes examiner's fingers on command.     LABS:                        11.3   7.50  )-----------( 249      ( 13 Feb 2025 05:30 )             36.9     02-13    135  |  98  |  46[H]  ----------------------------<  115[H]  5.1   |  25  |  1.11    Ca    9.6      13 Feb 2025 05:30  Phos  4.5     02-13  Mg     2.4     02-13          CAPILLARY BLOOD GLUCOSE        Urinalysis Basic - ( 13 Feb 2025 05:30 )    Color: x / Appearance: x / SG: x / pH: x  Gluc: 115 mg/dL / Ketone: x  / Bili: x / Urobili: x   Blood: x / Protein: x / Nitrite: x   Leuk Esterase: x / RBC: x / WBC x   Sq Epi: x / Non Sq Epi: x / Bacteria: x        MICRODATA:      RADIOLOGY/OTHER STUDIES:

## 2025-02-13 NOTE — PROGRESS NOTE ADULT - SUBJECTIVE AND OBJECTIVE BOX
HPI:  Unknown age male, unknown past medical history (reported stroke and MI by coworkers) presented to [facility] with AMS, Pt was working at [employer] when he was found down by coworkers. EMS called and pt brought to [facility] ED. Intubated, sedated, started on cardene for SBPs in 200s. CT head showed brain stem bleed. Transferred to NSICU for further management.  (30 Sep 2024 12:55)    INTERVAL EVENTS:  No acute events overnight.     HOSPITAL COURSE:  9/30: transferred from [facility]. A line placed. Versed dc'd. Cy Rader at bedside, states pt has family in [place], cannot confirm medications or PMH other than stroke and MI. 250cc bolus 3% given. LR switched to NS. hydralazine 25q8 started, 3% started, switched propofol to precedex   10/1: stability CTH done. Added labetalol, started TF. Palliative consulted. ethics consulted to determine surrogate. febrile 103, pan cx sent  10/2: BD 2, GEORGE overnight. TF resumed. Desatt'd to 80s, FiO2 inc. to 50. Fentanyl given, ABG, CXR ordered. Maxxed on precedex, started on propofol for DARIEN -4 - -5. Precedex dc'd. Duonebs, mucomyst, hypertonic added. 3% dc'd. Cardene dc'd. Start vanc/CTX. Increased labetalol 200q8. MRSA negative, dc'd vanc. ETT pulled back 2cm x 2, good positioning after confirmatory chest xray. Ethics attempting to establish HCP with family. Na 159, starting FW 250q6 for range 150-155.   10/3: BD3, GEORGE o/n, neuro stable. Na elevating, FW increased to 300q6. Dc'd bowel reg for diarrhea. vEEG started. SQH 5000q8 tonight.   10/4: BD 4, albumn bolus, incr. LR to 80 2/2 incr. in Cr, LR to 10 0cc/hr for uptrending Cr. Started 7% hypersal for 48hrs and SL atropine for inline/oral thick secretions. Dc'd CTX and started ancef for MSSA in the sputum. Nephrology consulted for CKD, f/u recs. SBP 170s, given hydralazine 10mg IVP.   10/5: BD5, o/n 10mg IVP hydralazine given for SBP 170s and started on hydralazine 25q8 via OGT. 10mg IV push labetalol for SBP > 160s. RT placed for diarrhea.   10/6: BD6, o/n FW increased to 350q4 per nephrology recs. IV tylenol for temp 100.6, SBp 160s presumed uncomfortable.   10/7: BD7, overnight pancultured for temp 101.8F.   10/8: BD8. GEORGE. Cr bumped. decreased LR to 75cc/hr. Adding simethicone ATC. incr hydralazine 50mgTID. Incr labetalol 300mgTID. Na 145, decreased FWF to 250q6. Start precedex. FENa consistent with intrinsic kidney injury. Pend repeat renal US. Retaining up to 1.3L, bladder scans q6, straight cath PRN  10/9: BD 9. GEORGE overnight. Neuro stable. abd xray for distention w non-specific gas pattern, OGT to LIWS for morning. duonebs/mucomyst to q8 for improving secretions. Changed tube feeds to Jevity 1.5 20cc/hr, low rate due to abdominal distention, nepro dense and more difficult to digest. Tolerating CPAP, confirmed by ABG.   10/10: BD 10. GEORGE overnight. Neuro stable. (+) gabriel for urinary retention on bladder scan. inc TF to goal rate of 40cc/hr. family leaning toward pursuing trach/PEG. 1/2 amp for FS 81.   10/11: BD 11. GEORGE overnight. Neuro stable. Trach/PEG consults placed.   10/12: BD 12. GEORGE overnight. Neuro stable. MRI brain complete.   10/13: BD 13. Increase flomax. Hold SQH after PM dose for trach tm. IVL.   10/14: BD 14. GEORGE overnight, remains on AC/VC. Gabriel placed for urinary retention. Dc'd free water.  S/p trach with pulm. NGT placed and CXR confirmed in good position.   10/15: BD 15, GEORGE ovn. resumed feeds. spiked 101, pan cx sent.   10/16: BD 16. GEORGE ovn. Lokelma 5mg for K+ 5.4. Started vanc q 24/zosyn for empiric PNA coverage, IVF to 100/hr. PEG held for fever.   10/17: BD 17,  ordered serum osm and urine osm for am. Started sinemet for neurostimulation. Increased cardura to 0.8. Started FW 100q4, dc'd IVF. MRSA negative, dc'd vanc. NGT replaced d/t coiling.   10/18: BD 18, GEORGE overnight, neuro stable. Amantadine added for neurostim. zosyn changed to unasyn for acinetobacter baumannii, failed TOV and required SC  10/19: BD 19, GEORGE ovn. cardura 2mg added for retention. labetalol decreased 200q8, hydralazine decreased 25q8. Gabriel replaced.   10/20: BD20, GEORGE overnight. NGT dislodged, replaced. PEG tomorrow w/ gen surg, FW increased to 150q4 and labetalol decreased to 100q8, lokelma given for hyperkalemia.   10/21: BD 21. POD0 PEG placement with Gen surg. decr labetolol to 50q8, incr. cardura to 0.4, started lokelma and phoslo, dc gabriel POD0 PEG placement with Gen surg.  10/22: BD 22. Plan to start TF today via PEG. dc labetalol, Following ophtho recs. Increased apnea settings - found to be in cheyne-moe respiration. CPAP 5/5.  10/23: BD 23. hydralazine d/c'd, trach collar trial today. Rectal tube placed at 6am.  10/24: BD24, o/n lokelma held due to diarrhea. Free water 100q6 resumed. dc'd tamsulosin, amantadine. Incr'd cardura to 8mg qhs. Dc'd FW. Switched jevity to nepro. gabriel placed for high urine output. Started SL atropine for oral secretions. Dc'd free water.  10/25: BD25, o/n decreased suctioning requirements to > q4hrs, GEORGE. Cr improving, cont phoslo, lokelma held at this time. Gabriel placed yest, cont. Tolerating trach collar. Given 500cc plasmalyte bolus for ANIKA. Dc'd sinemet.   10/26: BD26, o/n resumed lokelma 5mg daily and resumed 100cc free water q6hrs. Change in neuro status with new right pupillary dilation with anisocoria (right pupil 6mm fixed and left pupil 3mm briskly reactive). Given 23.4% NaCl bullet, taken for emergent CTH showing mostly resolved pontine hemorrhage, continued brainstem hypodensity likely edema d/t hemorrhage, no new hemorrhage or infarct, no herniation, mild increase in size of left lateral ventricle. Vitals remaine stable. Na goal > 140.   10/27: BD27, o/n GEORGE.Neuro stable. Pend stepdown with airway bed.   10/28: BD 28. GEORGE overnight. Neuro stable. Miralax ordered. Gabriel removed, pending TOV.  10/29: BD 29. GEORGE o/n. Given 2L NS over 8 hrs for increased BUN/Cr ratio. Gabriel placed for frequent straight cath.   10/30: BD 30.   10/31: BD 31. GEORGE overnight. Na 149, increased free water to 200q6. 1L NS for uptrending BUN.   11/1: BD 32. GEOGRE overnight. Given 1L NS for dehydration. Na 146, increased FW to 250q6.   11/2: BD 33 GEORGE overnight, neuro stable, given 500cc bolus for net negative status and tachycardia   11/3: BD 34, GEORGE overnight, neuro stable. Patient remains tachycardic, EKG showing sinus tachycardia, given additional 500cc NS bolus. Febrile to 101.9F, pan cultured (without UA), CXR WNL, given tylenol.   11/4: BD 35, GEORGE overnight, neuro stable. Given 1L NS for tachycardia. sputum (+) for stenotropohomas maltophilia.   11/5: BD 36 GEORGE overnight, neuro stable. Vancomycin dc'd. Chest PT BID. ID consulted, cont zosyn.  11/6: BD 37. blood cx + klebsiella dc zosyn changed to cefepime, CTAP ordered, rpt blood cx sent.    11/7: BD 38. Pending CT A/P, given 250cc bolus and starting maintenance fluids overnight. Pending CT A/P after bolus   11/8: BD 39. CT CAP negative for infection.   11/9: BD 40. GEORGE overnight.  11/10: BD 41. GEORGE overnight. desat to 85 on trach collar, O2 inc to 10L and 100%, O2 sat inc to 95. pt tachy to 110s, euvolemic. given tylenol. ABG and CXR ordered. spiked fever, pancultured, RVP negative. AM ABG w pO2 79, rpt w pO2 79. pt appears comfortable, satting 94%.   11/11: BD 42. GEORGE overnight. pt became tachy to 130s, desat to 90 on 100% FiO2 and 10L. suctioned, (+) productive cough. temp 101.4, given 1g IV tylenol and 500cc NS bolus for euvolemia. fever and HR downtrending. LE dopplers negative for dvt  11/12: BD 43, GEORGE ovn, fever and HR downtrending, satting 97% 70% FIO2  11/13: BD 44, GEORGE ovn. started standing tylenol x24 hours for tachycardia. desat to 80s, o2 increased. CXR stable, pending CTA PE protocol.   11/14: BD 45, GEORGE overnight, neuro stable. resp therapy dec FiO2 to 70%.   11/15: BD 46, GEORGE overnight, neuro stable.  Rapid called for desaturation 30s, tachycardic 140s. Patient bagged, 100% fio2, heavily suctioned. CXR/POCUS unremarkable. ABG c/w desaturation. WBC 14.71. Afebrile. O2 improved to 90s and patient upgraded to ICU. ABG paO2 30s improved to 89 on vent. IV Tylenol x 1, sputum sent. Start protonix while o-n vent.   11/16: BD 47. POCUS showed collapsable IVCF, given 1L bolus. Vanco/Cefpime added empriic for PNA, NGT feeds restarted. MRSA swab neg, Vanc DC'd.   11/17: BD 48. GEORGE overnight. 1l bolus for tachycardia. Spiked to 101, cultured. 500cc bolus for tachycardia, tachy to 148 given 25mcg fentanyl, 250cc albumin, 1.5L bolus. 5 IV lopressor with response HR to 100s. +Stenotrophomonas on sputum cx.   11/18: BD 49. GEORGE overnight. Consulted ID, cefepime switched to bactrim x7days. Started hydrochlorothiazine 12.5mg daily.  11/19: BD 50. Tachy 120s, given tylenol and 500cc NS. Tolerating 5/5, switched to TCx. Holding phos binder. D/c Bactrim. D/c gabriel, f/u TOV. Dc'd PPI.   11/20: BD 51. GEORGE ovn. 1600 satting low 90s, mildly tachy to 110s, afebrile, RR wnl. O2 improved to mid 90s while inc O2 to 100% on TCx. CPAP 5/5 placed back on.  11/21: BD 52, GEORGE ovn, tolerating CPAP 5/5. Switch to trach collar during the day if tolerating well. HCTZ held for Cr bump, straight cath frequence increased to q4  11/22: BD 53, GEORGE ovn. Resumed phoslo. Gabriel placed. Resumed HCTZ.   11/23: BD 54. Holding tylenol in setting of possible fever, will require pan cx if febrile. Cr improved today. Cont CPAP. Bowel regimen held i/s/o diarrhea. FOBT negative.  11/24: BD 55. GEORGE overnight. Neuro stable. HCTZ dc'ed, started lisinopril 5. Lokelma dc'ed for K 3.7.   11/25: BD 56, GEORGE overnight. Neuro stable. dc'd lisinopril 5mg. Gabriel dc'd. TOV. 1545 noted to be hypotensive, MAP 50, in supine position on chair, HR 60s, afebrile, O2 96%. Given 1L cc NS bolus, placed back on bed in reverse trendelenberg, improved to map of 66. Neostick at bedside. Vitals check q1h. Dc'ed amlodipine. Failed TOV, bladder scan q6, sc prn. Added back Senna.   11/26: BD 57, GEORGE overnight. Neuro stable. Dc'd phoslo.   11/27: BD 58, GEORGE overnight. Neuro stable.    11/28: BD 59. Gabriel replaced.   11/29: GEORGE.  11/30: GEORGE, neuro stable.   12/1: BD 62, GEORGE overnight  12/2: BD 63, GEORGE overnight.; Given 1L bolus of LR for uptrending BUN/Cr.  12/3: BD 64. Reinstated eye gtt/moisture chamber given increased Rt eye injection  12/4: BD 65. GEORGE overnight. Attempted to speak with ophtho regarding eyelid weight/closure but no answer, full mailbox.   12/5: BD 66, GEORGE overnight, bowel regimen increased and had BM.   12/6: BD 67, GEORGE overnight, neuro stable.  12/7: GEORGE overnight, neuro stable. /110s, given x1 hydralazine 10 mg IVP. Restarted home amlodipine 5mg.  12/8: GEORGE. OOB to chair.     12/11: GEORGE, mucomyst added for thick secretions, simethicone for abd distension, abd xray with stool burden, increased bowel regimen.   12/12: GEORGE overnight.   12/13: GEORGE overnight.   12/14: GEORGE overnight  12/15: o/n Patient became tachycardic HR 120s and 10 minutes later O2 sat dropped as low as 89%. Patient suctioned without improvement in O2 sat and tube feeds found in suction catheter. TFs held.  STAT CXR ordered. STAT labs sent. Respiratory therapist called to bedside and patient trach connected to ventilator. After connection to ventilator and further suctioning O2 sat improved to 97% but patient HR remains 120-130s. Upgraded to NSICU for further management. Vancomycin and zosyn started. CTA  chest PE protocol and CTH ordered. Blood cultures sent.given 500cc bolus, rpt ABG sent pO2 243, CTH and CTA chest done. FS while NPO, FiO2 dec 50 pending ABG. sputum cx positive for few GPC and GNR.   12/16: GEORGE overnight, restarted amlodipine, troponin 75   12/17: GEORGE overnight, neuro stable. Attempted CPAP this morning, did not tolerate and back on full vent support. Dc'd Vanc. Tachycardia to 140s, noted extremities to be twitching along with jaw twitching. Given 2g of Keppra, total 4mg ativan and placed on EEG, full set of labs and lactate negative. Resumed trickle feeds at 20cc/hr. Given 250cc albumin for tachycardia.   12/18: GEORGE overnight. Neuro stable. CTH stable. EEG negative and dc'd.   12/19: GEORGE overnight. neuro stable. SIMV most of day, AC/VC at night.   12/20: GEORGE overnight, neuro stable. remains on VC/AC  12/21: GEORGE ovn. 1L bolus for tachy to 120s-130s.   12/22: GEORGE ovn. Tachy to 120s, given tylenol and IVF. 2mg IV ativan given for L jaw twitching. Sx resolved for 3 minutes and started again. Epilepsy contacted. Given 2mg IV ativan. Continued twitching. Increased keppra to 1500mg BID, 2mg ativan given. Propranolol started for refractory tachycardia. vEEG ordered. albumin given. POCUS performed, no b lines. Started on fosphenytoin, loaded w 20mg/kg then 100mg TID. febrile, pancx, started cefepime 2g q8, vancomycin  12/23: GEORGE ovn. +focal motor seizures on eeg. ID consulted. Vancomycin dc'ed as per ID.  12/24: GEORGE ovn, neuro stable. Baclofen 5 mg q12 started for hiccups. Na 129 from 134. Urine lytes c/w SIADH. Given 250cc 3% bolus. CT chest for infection w/u - f/u read. Repeat Na 136. UA negative  12/25: GEORGE ovn.   12/26: GEORGE ovn  12/27: GEORGE overnight. Blood cx neg @ 4 days, DC cefepime per medicine (sputum colonized).   12/28: Desaturation o/n to 80's, CXR obtained, pulse ox changed and sats resolved. Na 133 from 138, 250cc 3% bolus given. Cefepime resumed until 12/30 per ID. Rpt Na 138.  12/29: GEORGE overnight. Na stable.   12/30: GEORGE overnight. Family meeting with son, pt now DNR.   12/31: GEORGE overnight.   1/1: GEORGE overnight, neuro stable.  1/2: GEORGE overnight, neuro stable. FW decreased to 100q6.   1/3: GEORGE overnight, neuro stable. fosphenytoin IV changed to pheytoin PO via PEG per epilepsy recommendations  1/4: GEORGE overnight, neuro stable. FW incr. to 100q4  1/5: GEORGE overnight, neuro stable.   1/6: GEORGE overnight, neuro stable   1/7: GEORGE overnight, neuro stable. BUN/Cr increased, increased FW to 200q6. Given 1L bolus of LR over 2 hours. Gabriel d/c'd, voiding, continue bladder scan q6.   1/8: GEORGE overnight, neuro stable. BUN/Cr improving.  1/9: GEORGE overnight, neuro stable.   1/10: GEORGE overnight, neuro stable.   1/11: GEORGE overnight, neuro stable, vent settings stable.   1/12: GEORGE overnight, neuro stable. Febrile to 102F, f/u pan cx, chest xray, given tylenol. Zosyn started.   1/13: GEORGE overnight, neuro stable, cont Zosyn for presumed PNA, prelim sputum cx growing; few GS neg diplococci, mod GS neg rods, rare GS + rods, fever trend improved. Free water increased to 134hwm2.   1/14: GEORGE overnight. pending renal US for elevated Cr  1/15: GEORGE ovn. renal US complete.   1/16: GEORGE overnight, neuro stable   1/17: GEORGE overnight, neuro stable   1/18: GEORGE overnight, neuro stable.   1/19: GEORGE overnight, neuro stable. Propranolol dec to 5q8.   1/20: GEORGE overnight, neuro stable. Weaned propranolol to 5mg BID.   1/21: GEORGE ovn, in AM having rapid vertical eye movements with facial twitching, 2g total keppra given for AM dose and phenytoin level ordered, vital signs stable. CTH stable. Phenytoin increased to 100/100/150mg per epilepsy  1/22: GEORGE ovn. IV hydral x 1 for SBP 170s.   1/23: Cont to have focal motor seizures. Per epilepsy, changed phenytoin dose to 100/100/200.   1/24: Phenytoin lvl tmrw AM. Chest Xray completed due to temp 100.2F  1/25: GEORGE overnight. Incr dilantin morning and afternoon per epilepsy.   1/26: GEORGE overnight. Sustained focal twitching noticed by nursing. Ativan 8mg in total given. Fosphenytoin 1500mg IV given. Placed on EEG. Epilepsy following. TFs held. Phenytoin increased to 150/150/200.   1/27: o/n CTH completed due to continued lethargy after ativan given yesterday afternoon. TFs resumed. 500cc bolus NS given for SBP 90s. EEG dc'ed, discussed w/ Dr. Tomlin. Febrile 101F, pan cx sent. Likely left sided infiltrate on CXR, c/f aspiration PNA. Started on vanc/zosyn. MRSA swab pending.   1/28: Neuro stable, on vanc/zosyn. +UTI on UA, MRSA negative. Vanc dc'd. RVP negative. Zosyn increased to pseudomonal dosing.   1/29: GEORGE overnight, neuro stable.  1/30: GEORGE overnight, neuro stable. Dc'd zosyn due to resistance, switched to Levaquin.  1/31: GEORGE ovenight, neuro stable.   2/1: Brief desat. Improved with neb and reposititioning. ABG and POCUS wnl.   2/4: GEORGE overnight  2/5: GEORGE overnight  2/6: GEORGE ovn  2/7: GEORGE ovn. L eye redness noted, started erythromycin and lacrilube to L eye as well. LR @ 100 cc/hr x 10 hours for uptrending BUN/Cr. Resumed bowel reg.   2/8: GEORGE overnight. Escalated bowel regimen.   2/9: GEORGE overnight.  2/10: GEORGE overnight. Social work to start working on SNF placement. Pending appointment with [employer] to come to hospital for biometrics.  2/11: GEORGE overnight. Neuro stable. Potassium improved (downtrending).   2/12: GEORGE overnight. Neuro stable. Lokelma 5mg x 1. Decrease FW to 200q8. 1L NS bolus given for hydration, uptrending BUN/Cr. Wound care rec barrier wipes for penile wound.  TFs changed to [facility] renal formula per nutrition.  2/13 GEORGE overnight. Neuro stable.    Vital Signs Last 24 Hrs  T(C): 36.6 (12 Feb 2025 22:11), Max: 36.8 (12 Feb 2025 04:42)  T(F): 97.9 (12 Feb 2025 22:11), Max: 98.3 (12 Feb 2025 04:42)  HR: 64 (12 Feb 2025 20:25) (64 - 78)  BP: 136/96 (12 Feb 2025 20:00) (107/67 - 136/96)  BP(mean): 112 (12 Feb 2025 20:00) (82 - 112)  RR: 14 (12 Feb 2025 20:25) (14 - 18)  SpO2: 99% (12 Feb 2025 20:25) (95% - 100%)  Parameters below as of 12 Feb 2025 20:25  Patient On (Oxygen Delivery Method): ventilator  O2 Concentration (%): 40    I&O's Summary  11 Feb 2025 07:01  -  12 Feb 2025 07:00  --------------------------------------------------------  IN: 2284 mL / OUT: 1675 mL / NET: 609 mL    12 Feb 2025 07:01  -  13 Feb 2025 00:01  --------------------------------------------------------  IN: 1256 mL / OUT: 830 mL / NET: 426 mL    PHYSICAL EXAM:  General: patient seen laying supine in bed in NAD, Nigerien speaking  Neuro: opens eyes spontaneously, A+Ox3 with choices, tracks vertically, RUE squeezes hand to command, RLE wiggles toes to command, LUE 0/5, b/l lower extremities withdraw to noxious   HEENT: PERRL, vertical EOMI, left conjunctival injection without drainage or discharge   Neck: supple  Cardiac: RRR   Pulmonary: chest rise symmetric, nonlabored breathing, +trach  Abdomen: soft, nontender, nondistended, +PEG  Ext: perfusing well  Skin: warm, dry    TUBES/LINES:  [] Gabriel  [X] Trach  [] NGT  [X] PEG  [] Wound Drains  [] Others    DIET:  [] NPO  [] Mechanical  [X] Tube feeds    LABS:             14.0   4.92  )-----------( 221      ( 12 Feb 2025 05:30 )             44.9     133[L]  |  96  |  50[H]  ----------------------------<  98  5.5[H]   |  26  |  1.19    Ca    9.7      12 Feb 2025 05:30  Phos  4.9     02-12  Mg     2.5     02-12    Urinalysis Basic - ( 12 Feb 2025 05:30 )  Color: x / Appearance: x / SG: x / pH: x  Gluc: 98 mg/dL / Ketone: x  / Bili: x / Urobili: x   Blood: x / Protein: x / Nitrite: x   Leuk Esterase: x / RBC: x / WBC x   Sq Epi: x / Non Sq Epi: x / Bacteria: x    Drug Levels: [X] N/A    CSF Analysis: [X] N/A    Allergies  Allergy Status Unknown    MEDICATIONS:  Antibiotics:    Neuro:  acetaminophen   Oral Liquid .. 650 milliGRAM(s) Oral every 6 hours PRN  baclofen 5 milliGRAM(s) Oral every 12 hours  levETIRAcetam 1500 milliGRAM(s) Oral every 12 hours  LORazepam   Injectable 2 milliGRAM(s) IV Push once PRN  phenytoin   Suspension 150 milliGRAM(s) Oral <User Schedule>  phenytoin   Suspension 200 milliGRAM(s) Oral <User Schedule>    Anticoagulation:  heparin   Injectable 5000 Unit(s) SubCutaneous every 8 hours    OTHER:  acetylcysteine 10%  Inhalation 4 milliLiter(s) Inhalation every 6 hours  albuterol/ipratropium for Nebulization 3 milliLiter(s) Nebulizer every 6 hours  amLODIPine   Tablet 5 milliGRAM(s) Oral daily  artificial tears (preservative free) Ophthalmic Solution 1 Drop(s) Right EYE every 4 hours  chlorhexidine 0.12% Liquid 15 milliLiter(s) Oral Mucosa every 12 hours  chlorhexidine 2% Cloths 1 Application(s) Topical <User Schedule>  doxazosin 8 milliGRAM(s) Oral at bedtime  erythromycin   Ointment 1 Application(s) Both EYES at bedtime  fluticasone propionate 50 MICROgram(s)/spray Nasal Spray 1 Spray(s) Both Nostrils two times a day  influenza   Vaccine 0.5 milliLiter(s) IntraMuscular once  ofloxacin 0.3% Solution 1 Drop(s) Right EYE four times a day  pantoprazole   Suspension 40 milliGRAM(s) Oral daily  petrolatum Ophthalmic Ointment 1 Application(s) Both EYES two times a day  polyethylene glycol 3350 17 Gram(s) Oral daily  propranolol 5 milliGRAM(s) Oral every 12 hours  senna 2 Tablet(s) Oral at bedtime  sodium chloride 0.65% Nasal 1 Spray(s) Both Nostrils two times a day    CULTURES:  Culture Results:   No growth at 5 days (02-01 @ 19:45)    ASSESSMENT:  44M PMH ?stroke/MI present to [facility] after collapsing at work. Decorticate posturing, vomiting, intubated for airway protection. 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 Transferred to [facility] for further management. s/p trach 10/14. s/p peg 10/21. Re-upgrade to ICU 2/2 desaturation event and suctioning requirements 11/15. Re-upgrade to NSICU 12/15 2/2 desaturation and tachycardia.    PLAN:  Neuro:  - neuro/vitals q4h  - pain control: tylenol prn  - seizure tx: keppra 1500mg BID, phenytoin 150/150/200  - s/p vEEG (10/3-4), (10/17-10/19), (12/17-12/18), (12/22-12/25), (1/26-1/28),  +focal motor seizures not correlating w/ EEG  - CTH 9/30: enlarged pontine hemorrhage, CTH 10/3: stable, CTH 10/25: mostly resolved pontine hemorrhage, CTH 12/15: R mastoid air cell opacification; acute otitis media vs sterile effusion, CTH 12/18: stable. CTH 1/21 stable, CTH 1/27 stable  - MRI brain 10/12: parenchymal hemorrhage, acute/subacute R cerebellar stroke      CV:  - -160  - tachycardia: propranolol 5mg BID   - HTN: amlodipine 5mg  - echo (9/30) EF 75%, repeat 12/16: 57%    Resp:  - trach to vent, AC/VC 40/400/14/6  - Secretions: duonebs/mucomyst/chest PT q6h     GI:  - TFs via PEG, [facility]  - bowel regimen, last BM 2/9  - baclofen 5q12 for hiccups    Renal:  - IVL  - FW 200q8 for hydration  - Voiding, SC prn  - CKD: trend BUN/Cr  - renal US 10/1, 10/8, 1/15: Increased bilateral renal echogenicity consistent with medical renal disease    Endo:  - A1c 5.4    Heme:  - DVT ppx: SCDs, SQH 5000u q8h   - LE dopplers negative 12/16    ID:  - last pan cx 1/27  - +klebsiella UTI s/p levaquin (1/30-2/3)  - empiric PNA: cefepime (12/22-12/30), s/p vanc (12/22-12/23), s/p zosyn (12/15-12/20), s/p vanc (12/15-12/16), s/p zosyn (1/12 -1/18)  - s/p Stenotrophomonas maltophilia PNA: s/p Bactrim (11/18-11/19) s/p Cefepime (11/16-11/18)  - 11/3, (+) sputum for stenotrophomas maltophlia, blood cx (+) klebsiella, cefepime 2gq12 (11/6 - 11/12)   - S/p Ancef (10/4-10/14) for PNA, and s/p Unasyn (10/18-10/23) +actinobacter baumanii     MISC:  - BL keratitis: erythromycin ointment b/l eye q4hrs, ofloxacin ointment R eye QID, artificial tears R eye q2hrs, moisture chamber at bedtime  - Penile wound: wound care rec barrier wipes     Dispo: SDU status, DNR, pending SNF    D/w Dr. D'Amico

## 2025-02-13 NOTE — PROGRESS NOTE ADULT - SUBJECTIVE AND OBJECTIVE BOX
PULMONARY CONSULT SERVICE FOLLOW-UP NOTE    INTERVAL HPI:  Reviewed chart and overnight events; patient seen and examined at bedside.  Patient remains intubated and vent dependent.    Pulmonary called regarding tracheostomy care and possible trach exchange.     MEDICATIONS:  Pulmonary:  acetylcysteine 10%  Inhalation 4 milliLiter(s) Inhalation every 6 hours  albuterol/ipratropium for Nebulization 3 milliLiter(s) Nebulizer every 6 hours    Antimicrobials:    Anticoagulants:  heparin   Injectable 5000 Unit(s) SubCutaneous every 8 hours    Cardiac:  amLODIPine   Tablet 5 milliGRAM(s) Oral daily  doxazosin 8 milliGRAM(s) Oral at bedtime  propranolol 5 milliGRAM(s) Oral every 12 hours      Allergies    Allergy Status Unknown    Intolerances        Vital Signs Last 24 Hrs  T(C): 36.9 (13 Feb 2025 14:02), Max: 36.9 (13 Feb 2025 04:39)  T(F): 98.5 (13 Feb 2025 14:02), Max: 98.5 (13 Feb 2025 04:39)  HR: 76 (13 Feb 2025 09:01) (64 - 78)  BP: 133/94 (13 Feb 2025 09:01) (118/86 - 136/96)  BP(mean): 110 (13 Feb 2025 09:01) (96 - 112)  RR: 18 (13 Feb 2025 09:01) (14 - 18)  SpO2: 99% (13 Feb 2025 09:01) (95% - 99%)    Parameters below as of 13 Feb 2025 09:01  Patient On (Oxygen Delivery Method): ventilator    O2 Concentration (%): 40    02-12 @ 07:01 - 02-13 @ 07:00  --------------------------------------------------------  IN: 1869 mL / OUT: 1830 mL / NET: 39 mL    02-13 @ 07:01  - 02-13 @ 15:05  --------------------------------------------------------  IN: 0 mL / OUT: 0 mL / NET: 0 mL      Mode: AC/ CMV (Assist Control/ Continuous Mandatory Ventilation)  RR (machine): 14  TV (machine): 400  FiO2: 40  PEEP: 6  ITime: 1  MAP: 9  PIP: 18      PHYSICAL EXAM:  Constitutional: NAD, on vent support   HEENT: NC/AT; PERRL, anicteric sclera; MMM  Neck: supple  Cardiovascular: +S1/S2, RRR  Respiratory: CTA B/L; no W/R/R  Gastrointestinal: soft, NT/ND  Extremities: WWP; no edema, clubbing or cyanosis  Vascular: 2+ radial pulses B/L  Neurological: awake and alert; BURROUGHS    LABS:      CBC Full  -  ( 13 Feb 2025 05:30 )  WBC Count : 7.50 K/uL  RBC Count : 3.77 M/uL  Hemoglobin : 11.3 g/dL  Hematocrit : 36.9 %  Platelet Count - Automated : 249 K/uL  Mean Cell Volume : 97.9 fl  Mean Cell Hemoglobin : 30.0 pg  Mean Cell Hemoglobin Concentration : 30.6 g/dL  Auto Neutrophil # : x  Auto Lymphocyte # : x  Auto Monocyte # : x  Auto Eosinophil # : x  Auto Basophil # : x  Auto Neutrophil % : x  Auto Lymphocyte % : x  Auto Monocyte % : x  Auto Eosinophil % : x  Auto Basophil % : x    02-13    135  |  98  |  46[H]  ----------------------------<  115[H]  5.1   |  25  |  1.11    Ca    9.6      13 Feb 2025 05:30  Phos  4.5     02-13  Mg     2.4     02-13            Urinalysis Basic - ( 13 Feb 2025 05:30 )    Color: x / Appearance: x / SG: x / pH: x  Gluc: 115 mg/dL / Ketone: x  / Bili: x / Urobili: x   Blood: x / Protein: x / Nitrite: x   Leuk Esterase: x / RBC: x / WBC x   Sq Epi: x / Non Sq Epi: x / Bacteria: x                RADIOLOGY & ADDITIONAL STUDIES:

## 2025-02-13 NOTE — PROGRESS NOTE ADULT - ASSESSMENT
44M with unclear PMH (?stroke, MI) who was found down at work, intubated for airway protection and found to have acute parenchymal hemorrhage within nabil with mass effect (+ acute/subacute right cerebellar infarct) in setting of hypertensive emergency, transferred to [facility] for further neurosurgical care. Hospital course c/b poor neurologic recovery s/p trach-PEG, AUR s/p gabriel, ANIKA on CKD c/b hyperkalemia, HAP s/p amp-sulbactam (EOT 10/23), K aerogenes bacteremia  treated with 2 weeks of Cefepime per ID , On 11/15 he became septic and was transferred to NSICU due to increased o2 requirements and needed Vent support , Trach Cultures + for Stenotrophomonas which was treated with 7 days of Bactrim per ID , has been weaned of Vent since 11/23 and is now on 10lts at 40% o2 through Trach Collar. Stepped up to the NSICU on 12/15 for hypoxia and tachycardia. PE ruled out. On abx for 5 days. Unclear etiology. Now stepped down to 8Lach.    # Pontine hemorrhage  # Encephalopathy due to Intracranial Bleed   - Acute parenchymal hemorrhage within nabil with mass effect (+ acute/subacute right cerebellar infarct) in setting of hypertensive emergency.   - MRI brain also demonstrated acute/subacute R cerebellar stroke.   - No Neurosurgical Interventions were performed   - Secondary to IPH and cerebellar stroke - Trach and PEG Dependent    # Klebsiella UTI - Resolved   - Urine cx 1/27 growing Klebsiella   - MRSA nares negative  - Completed 5 day course of Levaquin    # Seizures  - Continue Seizure precautions   - Continue Keppra and phenytoin - antiepileptic regimen per primary team and epilepsy    # Hypertension  - amlodipine 5mg daily , Propranolol  with holding parameters     # Chronic respiratory failure.   - s/p percutaneous trach by pulm on 10/14/24  - Currently on Vent Support   - Continue Chest PT    # Neurogenic dysphagia.   - Pt s/p PEG with surgery 10/21/24  - TF per nutrition  - aspiration precautions and elevation of HOB.    # Acute urinary retention.   - doxazosin 8mg qHS   - monitor urine output    # Functional quadriplegia in setting of brainstem hemorrhage  - decub precautions  - care per nursing protocol     # CKD stage 3 , Baseline Creatinine 1.1     # Hyperkalemia   -  monitor K , slowly trending up, only med that can cause elevation his heparin Sub q , administered Lokelma 5mg po x 1 on 2/12 , K on 2/13 not elevated.   - Switched to low potassium tube feeds on 2/12

## 2025-02-13 NOTE — PROGRESS NOTE ADULT - TIME BILLING
Review of hospital course, labs, vitals, medical records.   Bedside exam and interview   Discussed plan of care with primary team ACP  Documenting the encounter  Excludes teaching time and/or separately reported services

## 2025-02-13 NOTE — PROCEDURE NOTE - NSBRONCHPROCDETAILS_GEN_A_CORE_FT
AMBU scope was entered thru tracheostomy tube, remains in satisfactory position   Edematous mucosa in airways noted, sharp chris, thick creamy moderate secretions suctioned around chris LMB and RMB, deeper inspection of other segments remained cleaned   Secretions sent to lab for gram stain and culture   Patient tolerated procedure well with no complications and no bleeding   AMBU scope was entered thru tracheostomy tube, remains in satisfactory position.  Right and left bronchial tree examined to individual lobar level. Secretions therapeutically aspirated.  Secretions sent to lab for gram stain and culture   Patient tolerated procedure well with no complications and no bleeding        Findings:  Edematous mucosa in airways noted, sharp chris, moderate amount of thick mucoid secretions noted.

## 2025-02-13 NOTE — CHART NOTE - NSCHARTNOTEFT_GEN_A_CORE
Attempted to perform trach exchange at 7pm.  Bronchoscopy performed for airway inspection to assess for post-tracheostomy complications.  Thick secretions aspirated, sent for culture.    Attempted to inspect from above tracheostomy tube (via nare and mouth) however poorly tolerated by patient with significant cough burden, refractory to fentanyl 50 push.  Given that this is not an emergent procedure and patient is not sedated, plan at this time to reinspect tomorrow via bronchoscopy when patient is better sedated.  Following inspection we will exchange tracheostomy tube.

## 2025-02-14 LAB
ANION GAP SERPL CALC-SCNC: 10 MMOL/L — SIGNIFICANT CHANGE UP (ref 5–17)
BUN SERPL-MCNC: 46 MG/DL — HIGH (ref 7–23)
CALCIUM SERPL-MCNC: 9.1 MG/DL — SIGNIFICANT CHANGE UP (ref 8.4–10.5)
CHLORIDE SERPL-SCNC: 100 MMOL/L — SIGNIFICANT CHANGE UP (ref 96–108)
CO2 SERPL-SCNC: 25 MMOL/L — SIGNIFICANT CHANGE UP (ref 22–31)
CREAT SERPL-MCNC: 1.17 MG/DL — SIGNIFICANT CHANGE UP (ref 0.5–1.3)
EGFR: 78 ML/MIN/1.73M2 — SIGNIFICANT CHANGE UP
EGFR: 78 ML/MIN/1.73M2 — SIGNIFICANT CHANGE UP
GLUCOSE SERPL-MCNC: 110 MG/DL — HIGH (ref 70–99)
HCT VFR BLD CALC: 32.2 % — LOW (ref 39–50)
HGB BLD-MCNC: 10 G/DL — LOW (ref 13–17)
MAGNESIUM SERPL-MCNC: 2.3 MG/DL — SIGNIFICANT CHANGE UP (ref 1.6–2.6)
MCHC RBC-ENTMCNC: 30 PG — SIGNIFICANT CHANGE UP (ref 27–34)
MCHC RBC-ENTMCNC: 31.1 G/DL — LOW (ref 32–36)
MCV RBC AUTO: 96.7 FL — SIGNIFICANT CHANGE UP (ref 80–100)
NRBC BLD AUTO-RTO: 0 /100 WBCS — SIGNIFICANT CHANGE UP (ref 0–0)
PHOSPHATE SERPL-MCNC: 4.7 MG/DL — HIGH (ref 2.5–4.5)
PLATELET # BLD AUTO: 261 K/UL — SIGNIFICANT CHANGE UP (ref 150–400)
POTASSIUM SERPL-MCNC: 4.3 MMOL/L — SIGNIFICANT CHANGE UP (ref 3.5–5.3)
POTASSIUM SERPL-SCNC: 4.3 MMOL/L — SIGNIFICANT CHANGE UP (ref 3.5–5.3)
RBC # BLD: 3.33 M/UL — LOW (ref 4.2–5.8)
RBC # FLD: 12.9 % — SIGNIFICANT CHANGE UP (ref 10.3–14.5)
SODIUM SERPL-SCNC: 135 MMOL/L — SIGNIFICANT CHANGE UP (ref 135–145)
WBC # BLD: 6.14 K/UL — SIGNIFICANT CHANGE UP (ref 3.8–10.5)
WBC # FLD AUTO: 6.14 K/UL — SIGNIFICANT CHANGE UP (ref 3.8–10.5)

## 2025-02-14 PROCEDURE — 31622 DX BRONCHOSCOPE/WASH: CPT | Mod: GC

## 2025-02-14 PROCEDURE — 99232 SBSQ HOSP IP/OBS MODERATE 35: CPT

## 2025-02-14 PROCEDURE — 99232 SBSQ HOSP IP/OBS MODERATE 35: CPT | Mod: GC,25

## 2025-02-14 PROCEDURE — 71045 X-RAY EXAM CHEST 1 VIEW: CPT | Mod: 26

## 2025-02-14 RX ORDER — PROPOFOL 10 MG/ML
100 INJECTION, EMULSION INTRAVENOUS ONCE
Refills: 0 | Status: COMPLETED | OUTPATIENT
Start: 2025-02-14 | End: 2025-02-14

## 2025-02-14 RX ORDER — FENTANYL CITRATE-0.9 % NACL/PF 100MCG/2ML
0.5 SYRINGE (ML) INTRAVENOUS
Qty: 2500 | Refills: 0 | Status: DISCONTINUED | OUTPATIENT
Start: 2025-02-14 | End: 2025-02-15

## 2025-02-14 RX ORDER — FENTANYL CITRATE-0.9 % NACL/PF 100MCG/2ML
100 SYRINGE (ML) INTRAVENOUS ONCE
Refills: 0 | Status: DISCONTINUED | OUTPATIENT
Start: 2025-02-14 | End: 2025-02-14

## 2025-02-14 RX ORDER — PHENYLEPHRINE HCL IN 0.9% NACL 0.5 MG/5ML
100 SYRINGE (ML) INTRAVENOUS ONCE
Refills: 0 | Status: DISCONTINUED | OUTPATIENT
Start: 2025-02-14 | End: 2025-02-15

## 2025-02-14 RX ORDER — PROPOFOL 10 MG/ML
50 INJECTION, EMULSION INTRAVENOUS ONCE
Refills: 0 | Status: DISCONTINUED | OUTPATIENT
Start: 2025-02-14 | End: 2025-02-14

## 2025-02-14 RX ORDER — FENTANYL CITRATE-0.9 % NACL/PF 100MCG/2ML
0.5 SYRINGE (ML) INTRAVENOUS
Qty: 5000 | Refills: 0 | Status: DISCONTINUED | OUTPATIENT
Start: 2025-02-14 | End: 2025-02-14

## 2025-02-14 RX ORDER — PROPOFOL 10 MG/ML
10 INJECTION, EMULSION INTRAVENOUS
Qty: 1000 | Refills: 0 | Status: DISCONTINUED | OUTPATIENT
Start: 2025-02-14 | End: 2025-02-14

## 2025-02-14 RX ORDER — FENTANYL CITRATE-0.9 % NACL/PF 100MCG/2ML
50 SYRINGE (ML) INTRAVENOUS ONCE
Refills: 0 | Status: DISCONTINUED | OUTPATIENT
Start: 2025-02-14 | End: 2025-02-14

## 2025-02-14 RX ADMIN — ACETYLCYSTEINE 4 MILLILITER(S): 200 INHALANT RESPIRATORY (INHALATION) at 05:25

## 2025-02-14 RX ADMIN — Medication 50 MICROGRAM(S): at 15:45

## 2025-02-14 RX ADMIN — Medication 150 MILLIGRAM(S): at 13:56

## 2025-02-14 RX ADMIN — Medication 1 DROP(S): at 18:43

## 2025-02-14 RX ADMIN — IPRATROPIUM BROMIDE AND ALBUTEROL SULFATE 3 MILLILITER(S): .5; 2.5 SOLUTION RESPIRATORY (INHALATION) at 14:51

## 2025-02-14 RX ADMIN — ERYTHROMYCIN 1 APPLICATION(S): 5 OINTMENT OPHTHALMIC at 13:27

## 2025-02-14 RX ADMIN — Medication 40 MILLIGRAM(S): at 18:46

## 2025-02-14 RX ADMIN — ERYTHROMYCIN 1 APPLICATION(S): 5 OINTMENT OPHTHALMIC at 18:49

## 2025-02-14 RX ADMIN — ACETYLCYSTEINE 4 MILLILITER(S): 200 INHALANT RESPIRATORY (INHALATION) at 13:56

## 2025-02-14 RX ADMIN — HEPARIN SODIUM 5000 UNIT(S): 1000 INJECTION INTRAVENOUS; SUBCUTANEOUS at 05:30

## 2025-02-14 RX ADMIN — FLUTICASONE PROPIONATE 1 SPRAY(S): 50 SPRAY, METERED NASAL at 05:30

## 2025-02-14 RX ADMIN — AMLODIPINE BESYLATE 5 MILLIGRAM(S): 10 TABLET ORAL at 05:29

## 2025-02-14 RX ADMIN — ACETYLCYSTEINE 4 MILLILITER(S): 200 INHALANT RESPIRATORY (INHALATION) at 18:48

## 2025-02-14 RX ADMIN — ACETYLCYSTEINE 4 MILLILITER(S): 200 INHALANT RESPIRATORY (INHALATION) at 23:48

## 2025-02-14 RX ADMIN — ERYTHROMYCIN 1 APPLICATION(S): 5 OINTMENT OPHTHALMIC at 05:45

## 2025-02-14 RX ADMIN — IPRATROPIUM BROMIDE AND ALBUTEROL SULFATE 3 MILLILITER(S): .5; 2.5 SOLUTION RESPIRATORY (INHALATION) at 05:25

## 2025-02-14 RX ADMIN — Medication 200 MILLIGRAM(S): at 18:51

## 2025-02-14 RX ADMIN — Medication 15 MILLILITER(S): at 05:30

## 2025-02-14 RX ADMIN — Medication 1 APPLICATION(S): at 05:45

## 2025-02-14 RX ADMIN — Medication 2 TABLET(S): at 21:51

## 2025-02-14 RX ADMIN — Medication 1 DROP(S): at 05:11

## 2025-02-14 RX ADMIN — PROPOFOL 100 MILLIGRAM(S): 10 INJECTION, EMULSION INTRAVENOUS at 15:15

## 2025-02-14 RX ADMIN — ERYTHROMYCIN 1 APPLICATION(S): 5 OINTMENT OPHTHALMIC at 23:47

## 2025-02-14 RX ADMIN — DOXAZOSIN MESYLATE 8 MILLIGRAM(S): 8 TABLET ORAL at 21:51

## 2025-02-14 RX ADMIN — Medication 1 SPRAY(S): at 18:47

## 2025-02-14 RX ADMIN — Medication 1 APPLICATION(S): at 13:27

## 2025-02-14 RX ADMIN — Medication 100 MICROGRAM(S): at 15:21

## 2025-02-14 RX ADMIN — Medication 1 DROP(S): at 08:45

## 2025-02-14 RX ADMIN — BACLOFEN 5 MILLIGRAM(S): 10 INJECTION INTRATHECAL at 18:47

## 2025-02-14 RX ADMIN — IPRATROPIUM BROMIDE AND ALBUTEROL SULFATE 3 MILLILITER(S): .5; 2.5 SOLUTION RESPIRATORY (INHALATION) at 23:48

## 2025-02-14 RX ADMIN — HEPARIN SODIUM 5000 UNIT(S): 1000 INJECTION INTRAVENOUS; SUBCUTANEOUS at 13:27

## 2025-02-14 RX ADMIN — IPRATROPIUM BROMIDE AND ALBUTEROL SULFATE 3 MILLILITER(S): .5; 2.5 SOLUTION RESPIRATORY (INHALATION) at 18:48

## 2025-02-14 RX ADMIN — BACLOFEN 5 MILLIGRAM(S): 10 INJECTION INTRATHECAL at 05:28

## 2025-02-14 RX ADMIN — Medication 1 SPRAY(S): at 05:30

## 2025-02-14 RX ADMIN — LEVETIRACETAM 1500 MILLIGRAM(S): 10 INJECTION, SOLUTION INTRAVENOUS at 05:27

## 2025-02-14 RX ADMIN — LEVETIRACETAM 1500 MILLIGRAM(S): 10 INJECTION, SOLUTION INTRAVENOUS at 18:46

## 2025-02-14 RX ADMIN — HEPARIN SODIUM 5000 UNIT(S): 1000 INJECTION INTRAVENOUS; SUBCUTANEOUS at 21:51

## 2025-02-14 RX ADMIN — Medication 1 APPLICATION(S): at 05:29

## 2025-02-14 RX ADMIN — Medication 1 APPLICATION(S): at 23:53

## 2025-02-14 RX ADMIN — Medication 1 APPLICATION(S): at 18:50

## 2025-02-14 RX ADMIN — Medication 50 MICROGRAM(S): at 15:30

## 2025-02-14 RX ADMIN — Medication 1 DROP(S): at 13:27

## 2025-02-14 RX ADMIN — FLUTICASONE PROPIONATE 1 SPRAY(S): 50 SPRAY, METERED NASAL at 18:47

## 2025-02-14 RX ADMIN — Medication 1 DROP(S): at 21:52

## 2025-02-14 RX ADMIN — Medication 150 MILLIGRAM(S): at 05:45

## 2025-02-14 RX ADMIN — Medication 100 MICROGRAM(S): at 15:06

## 2025-02-14 NOTE — PROGRESS NOTE ADULT - SUBJECTIVE AND OBJECTIVE BOX
Patient is a 44y old  Male who presents with a chief complaint of brain stem bleed (13 Feb 2025 15:03)    INTERVAL EVENTS:  awake,     does not talk   squeezes examiner's fingers on command     SUBJECTIVE:  Patient was seen and examined at bedside.  unable to perform review of systems 2/2 mental status    Diet, NPO with Tube Feed:   Tube Feeding Modality: Gastrostomy  Maribeth Sauceda Renal  Total Volume for 24 Hours (mL): 1062  Total Number of Cans: 5  Continuous  Starting Tube Feed Rate mL per Hour: 59  Increase Tube Feed Rate by (mL): 0     Every 4 hours  Until Goal Tube Feed Rate (mL per Hour): 59  Tube Feed Duration (in Hours): 18  Tube Feed Start Time: 11:00  Tube Feed Stop Time: 05:00 (02-12-25 @ 16:32) [Active]      MEDICATIONS:  MEDICATIONS  (STANDING):  acetylcysteine 10%  Inhalation 4 milliLiter(s) Inhalation every 6 hours  albuterol/ipratropium for Nebulization 3 milliLiter(s) Nebulizer every 6 hours  amLODIPine   Tablet 5 milliGRAM(s) Oral daily  artificial tears (preservative free) Ophthalmic Solution 1 Drop(s) Right EYE every 4 hours  baclofen 5 milliGRAM(s) Oral every 12 hours  chlorhexidine 0.12% Liquid 15 milliLiter(s) Oral Mucosa every 12 hours  chlorhexidine 2% Cloths 1 Application(s) Topical <User Schedule>  doxazosin 8 milliGRAM(s) Oral at bedtime  erythromycin   Ointment 1 Application(s) Both EYES four times a day  fluticasone propionate 50 MICROgram(s)/spray Nasal Spray 1 Spray(s) Both Nostrils two times a day  heparin   Injectable 5000 Unit(s) SubCutaneous every 8 hours  influenza   Vaccine 0.5 milliLiter(s) IntraMuscular once  levETIRAcetam 1500 milliGRAM(s) Oral every 12 hours  pantoprazole   Suspension 40 milliGRAM(s) Oral daily  petrolatum Ophthalmic Ointment 1 Application(s) Both EYES four times a day  phenytoin   Suspension 150 milliGRAM(s) Oral <User Schedule>  phenytoin   Suspension 200 milliGRAM(s) Oral <User Schedule>  polyethylene glycol 3350 17 Gram(s) Oral daily  propranolol 5 milliGRAM(s) Oral every 12 hours  senna 2 Tablet(s) Oral at bedtime  sodium chloride 0.65% Nasal 1 Spray(s) Both Nostrils two times a day        Allergies    Allergy Status Unknown    Intolerances        OBJECTIVE:  Vital Signs Last 24 Hrs  T(C): 36.3 (14 Feb 2025 08:55), Max: 36.9 (13 Feb 2025 14:02)  T(F): 97.4 (14 Feb 2025 08:55), Max: 98.5 (13 Feb 2025 14:02)  HR: 64 (14 Feb 2025 09:25) (64 - 98)  BP: 103/71 (14 Feb 2025 08:00) (97/67 - 128/86)  BP(mean): 83 (14 Feb 2025 08:00) (77 - 101)  RR: 14 (14 Feb 2025 09:25) (14 - 19)  SpO2: 99% (14 Feb 2025 09:25) (95% - 100%)    Parameters below as of 14 Feb 2025 09:25  Patient On (Oxygen Delivery Method): ventilator    O2 Concentration (%): 40        PHYSICAL EXAM:  Gen: Reclining in bed at time of exam,   HEENT: trach in place   CV: RRR, +S1/S2  Pulm: adequate respiratory effort, no increase in work of breathing  Abd: soft, ND  Skin: warm and dry,   Neuro: awake, does not talke, squeezes examiner's fingers on command.     LABS:                                   10.0   6.14  )-----------( 261      ( 14 Feb 2025 05:30 )             32.2     02-14    135  |  100  |  46[H]  ----------------------------<  110[H]  4.3   |  25  |  1.17    Ca    9.1      14 Feb 2025 05:30  Phos  4.7     02-14  Mg     2.3     02-14              CAPILLARY BLOOD GLUCOSE        Urinalysis Basic - ( 13 Feb 2025 05:30 )    Color: x / Appearance: x / SG: x / pH: x  Gluc: 115 mg/dL / Ketone: x  / Bili: x / Urobili: x   Blood: x / Protein: x / Nitrite: x   Leuk Esterase: x / RBC: x / WBC x   Sq Epi: x / Non Sq Epi: x / Bacteria: x        MICRODATA:      RADIOLOGY/OTHER STUDIES:

## 2025-02-14 NOTE — PROGRESS NOTE ADULT - SUBJECTIVE AND OBJECTIVE BOX
PULMONARY CONSULT SERVICE FOLLOW-UP NOTE    INTERVAL HPI:  Reviewed chart and overnight events; patient seen and examined at bedside.  Remains on vent support.     MEDICATIONS:  Pulmonary:  acetylcysteine 10%  Inhalation 4 milliLiter(s) Inhalation every 6 hours  albuterol/ipratropium for Nebulization 3 milliLiter(s) Nebulizer every 6 hours    Antimicrobials:    Anticoagulants:  heparin   Injectable 5000 Unit(s) SubCutaneous every 8 hours    Cardiac:  amLODIPine   Tablet 5 milliGRAM(s) Oral daily  doxazosin 8 milliGRAM(s) Oral at bedtime  phenylephrine   100 mCg/mL NaCl 0.9% Injectable 100 MICROGram(s) IV Push once  propranolol 5 milliGRAM(s) Oral every 12 hours      Allergies    Allergy Status Unknown    Intolerances        Vital Signs Last 24 Hrs  T(C): 36.6 (14 Feb 2025 17:53), Max: 36.9 (13 Feb 2025 21:23)  T(F): 97.9 (14 Feb 2025 17:53), Max: 98.5 (13 Feb 2025 21:23)  HR: 70 (14 Feb 2025 20:38) (60 - 86)  BP: 116/81 (14 Feb 2025 18:28) (103/69 - 143/99)  BP(mean): 94 (14 Feb 2025 18:28) (81 - 117)  RR: 14 (14 Feb 2025 20:38) (14 - 24)  SpO2: 100% (14 Feb 2025 20:38) (92% - 100%)    Parameters below as of 14 Feb 2025 20:38  Patient On (Oxygen Delivery Method): ventilator    O2 Concentration (%): 50    02-13 @ 07:01 - 02-14 @ 07:00  --------------------------------------------------------  IN: 2462 mL / OUT: 2150 mL / NET: 312 mL    02-14 @ 07:01 - 02-14 @ 20:48  --------------------------------------------------------  IN: 1331 mL / OUT: 1300 mL / NET: 31 mL      Mode: AC/ CMV (Assist Control/ Continuous Mandatory Ventilation)  RR (machine): 14  TV (machine): 400  FiO2: 50  PEEP: 6  ITime: 1  MAP: 8.6  PIP: 17      PHYSICAL EXAM:  Constitutional: NAD, strong cough, secretions present   HEENT: NC/AT; PERRL, anicteric sclera; MMM  Neck: supple  Cardiovascular: +S1/S2, RRR  Respiratory: CTA B/L; no W/R/R  Gastrointestinal: soft, NT/ND  Extremities: WWP; no edema, clubbing or cyanosis  Vascular: 2+ radial pulses B/L  Neurological: Non verbal    LABS:      CBC Full  -  ( 14 Feb 2025 05:30 )  WBC Count : 6.14 K/uL  RBC Count : 3.33 M/uL  Hemoglobin : 10.0 g/dL  Hematocrit : 32.2 %  Platelet Count - Automated : 261 K/uL  Mean Cell Volume : 96.7 fl  Mean Cell Hemoglobin : 30.0 pg  Mean Cell Hemoglobin Concentration : 31.1 g/dL  Auto Neutrophil # : x  Auto Lymphocyte # : x  Auto Monocyte # : x  Auto Eosinophil # : x  Auto Basophil # : x  Auto Neutrophil % : x  Auto Lymphocyte % : x  Auto Monocyte % : x  Auto Eosinophil % : x  Auto Basophil % : x    02-14    135  |  100  |  46[H]  ----------------------------<  110[H]  4.3   |  25  |  1.17    Ca    9.1      14 Feb 2025 05:30  Phos  4.7     02-14  Mg     2.3     02-14            Urinalysis Basic - ( 14 Feb 2025 05:30 )    Color: x / Appearance: x / SG: x / pH: x  Gluc: 110 mg/dL / Ketone: x  / Bili: x / Urobili: x   Blood: x / Protein: x / Nitrite: x   Leuk Esterase: x / RBC: x / WBC x   Sq Epi: x / Non Sq Epi: x / Bacteria: x                RADIOLOGY & ADDITIONAL STUDIES:

## 2025-02-14 NOTE — PROGRESS NOTE ADULT - SUBJECTIVE AND OBJECTIVE BOX
HPI:  Unknown age male, unknown past medical history (reported stroke and MI by coworkers) presented to [facility] with AMS, Pt was working at [employer] when he was found down by coworkers. EMS called and pt brought to [facility] ED. Intubated, sedated, started on cardene for SBPs in 200s. CT head showed brain stem bleed. Transferred to NSICU for further management.  (30 Sep 2024 12:55)    HOSPITAL COURSE:   1/1: GEORGE overnight, neuro stable.  1/2: GEORGE overnight, neuro stable. FW decreased to 100q6.   1/3: GEORGE overnight, neuro stable. fosphenytoin IV changed to pheytoin PO via PEG per epilepsy recommendations  1/4: GEORGE overnight, neuro stable. FW incr. to 100q4  1/5: GEORGE overnight, neuro stable.   1/6: GEORGE overnight, neuro stable   1/7: GEORGE overnight, neuro stable. BUN/Cr increased, increased FW to 200q6. Given 1L bolus of LR over 2 hours. Vuong d/c'd, voiding, continue bladder scan q6.   1/8: GEORGE overnight, neuro stable. BUN/Cr improving.  1/9: GEORGE overnight, neuro stable.   1/10: GEORGE overnight, neuro stable.   1/11: GEORGE overnight, neuro stable, vent settings stable.   1/12: GEORGE overnight, neuro stable. Febrile to 102F, f/u pan cx, chest xray, given tylenol. Zosyn started.   1/13: GEORGE overnight, neuro stable, cont Zosyn for presumed PNA, prelim sputum cx growing; few GS neg diplococci, mod GS neg rods, rare GS + rods, fever trend improved. Free water increased to [id number].   1/14: GEORGE overnight. pending renal US for elevated Cr  1/15: GEORGE ovn. renal US complete.   1/16: GEORGE overnight, neuro stable   1/17: GEORGE overnight, neuro stable   1/18: GEORGE overnight, neuro stable.   1/19: GEORGE overnight, neuro stable. Propranolol dec to 5q8.   1/20: GEORGE overnight, neuro stable. Weaned propranolol to 5mg BID.   1/21: GEORGE ovn, in AM having rapid vertical eye movements with facial twitching, 2g total keppra given for AM dose and phenytoin level ordered, vital signs stable. CTH stable. Phenytoin increased to 100/100/150mg per epilepsy  1/22: GEORGE ovn. IV hydral x 1 for SBP 170s.   1/23: Cont to have focal motor seizures. Per epilepsy, changed phenytoin dose to 100/100/200.   1/24: Phenytoin lvl tmrw AM. Chest Xray completed due to temp 100.2F  1/25: GEORGE overnight. Incr dilantin morning and afternoon per epilepsy.   1/26: GEORGE overnight. Sustained focal twitching noticed by nursing. Ativan 8mg in total given. Fosphenytoin 1500mg IV given. Placed on EEG. Epilepsy following. TFs held. Phenytoin increased to 150/150/200.   1/27: o/n CTH completed due to continued lethargy after ativan given yesterday afternoon. TFs resumed. 500cc bolus NS given for SBP 90s. EEG dc'ed, discussed w/ Dr. Tomlin. Febrile 101F, pan cx sent. Likely left sided infiltrate on CXR, c/f aspiration PNA. Started on vanc/zosyn. MRSA swab pending.   1/28: Neuro stable, on vanc/zosyn. +UTI on UA, MRSA negative. Vanc dc'd. RVP negative. Zosyn increased to pseudomonal dosing.   1/29: GEORGE overnight, neuro stable.  1/30: GEORGE overnight, neuro stable. Dc'd zosyn due to resistance, switched to Levaquin.  1/31: GEORGE ovenight, neuro stable.   2/1: Brief desat. Improved with neb and reposititioning. ABG and POCUS wnl.   2/4: GEORGE overnight  2/5: GEORGE overnight  2/6: GEORGE ovn  2/7: GEORGE ovn. L eye redness noted, started erythromycin and lacrilube to L eye as well. LR @ 100 cc/hr x 10 hours for uptrending BUN/Cr. Resumed bowel reg.   2/8: GEORGE overnight. Escalated bowel regimen.   2/9: GEORGE overnight.  2/10: GEORGE overnight. Social work to start working on SNF placement. Pending appointment with [employer] to come to hospital for biometrics.  2/11: GEORGE overnight. Neuro stable. Potassium improved (downtrending).   2/12: GEORGE overnight. Neuro stable. Lokelma 5mg x 1. Decrease FW to 200q8. 1L NS bolus given for hydration, uptrending BUN/Cr. Wound care rec barrier wipes for penile wound.  TFs changed to [employer] renal formula per nutrition.  2/13: GEORGE overnight. Neuro stable. Corneal abrasion noted on exam, ophtho re-consulted for L eye, eye drops changed per recs to q4. Pulm contacted for trach exchange, attempted at bedside but patient unable to tolerate with minimal sedation, will plan for tomorrow.   2/14: GEORGE overnight, given 1L of NS for low BP after fentanyl with improvement. Neuro exam stable, pend trach exchange today with pulm. Pend optho assessment for left eye.     OVERNIGHT EVENTS:  Vital Signs Last 24 Hrs  T(C): 36.9 (13 Feb 2025 21:23), Max: 36.9 (13 Feb 2025 04:39)  T(F): 98.5 (13 Feb 2025 21:23), Max: 98.5 (13 Feb 2025 04:39)  HR: 80 (14 Feb 2025 00:20) (71 - 98)  BP: 107/64 (14 Feb 2025 00:20) (97/67 - 133/94)  BP(mean): 81 (14 Feb 2025 00:20) (77 - 110)  RR: 15 (14 Feb 2025 00:20) (14 - 19)  SpO2: 96% (14 Feb 2025 00:20) (95% - 100%)    Parameters below as of 14 Feb 2025 00:20  Patient On (Oxygen Delivery Method): ventilator    O2 Concentration (%): 40    I&O's Summary    12 Feb 2025 07:01  -  13 Feb 2025 07:00  --------------------------------------------------------  IN: 1869 mL / OUT: 1830 mL / NET: 39 mL    13 Feb 2025 07:01  -  14 Feb 2025 01:54  --------------------------------------------------------  IN: 2026 mL / OUT: 1650 mL / NET: 376 mL        PHYSICAL EXAM:  Constitutional: NAD, lying in bed.  HEENT: Pupils equal, round, reactive to light.   Respiratory: +Trach to mechanical vent. No respiratory distress, symmetric chest rise  Cardiovascular: Regular rate and rhythm    Gastrointestinal: +PEG, Abdomen soft, nondistended.  Neurological:  AAOX0-1. Opens eyes. Tracks vertically  Motor: RUE squeezes hand to comman, LUE 0/5, B/l LE w/d.   Sensation: unable to assess  Extremities: Warm, well perfused.    TUBES/LINES:  [] CVC  [] A-line  [] Lumbar Drain  [] Ventriculostomy  [] Other    DIET:  [] NPO  [] Mechanical  [X] Tube feeds      LABS:                        11.3   7.50  )-----------( 249      ( 13 Feb 2025 05:30 )             36.9     02-13    135  |  98  |  46[H]  ----------------------------<  115[H]  5.1   |  25  |  1.11    Ca    9.6      13 Feb 2025 05:30  Phos  4.5     02-13  Mg     2.4     02-13        Urinalysis Basic - ( 13 Feb 2025 05:30 )    Color: x / Appearance: x / SG: x / pH: x  Gluc: 115 mg/dL / Ketone: x  / Bili: x / Urobili: x   Blood: x / Protein: x / Nitrite: x   Leuk Esterase: x / RBC: x / WBC x   Sq Epi: x / Non Sq Epi: x / Bacteria: x          CAPILLARY BLOOD GLUCOSE          Drug Levels: [] N/A    CSF Analysis: [] N/A      Allergies    Allergy Status Unknown    Intolerances      MEDICATIONS:  Antibiotics:    Neuro:  acetaminophen   Oral Liquid .. 650 milliGRAM(s) Oral every 6 hours PRN  baclofen 5 milliGRAM(s) Oral every 12 hours  levETIRAcetam 1500 milliGRAM(s) Oral every 12 hours  LORazepam   Injectable 2 milliGRAM(s) IV Push once PRN  phenytoin   Suspension 150 milliGRAM(s) Oral <User Schedule>  phenytoin   Suspension 200 milliGRAM(s) Oral <User Schedule>    Anticoagulation:  heparin   Injectable 5000 Unit(s) SubCutaneous every 8 hours    OTHER:  acetylcysteine 10%  Inhalation 4 milliLiter(s) Inhalation every 6 hours  albuterol/ipratropium for Nebulization 3 milliLiter(s) Nebulizer every 6 hours  amLODIPine   Tablet 5 milliGRAM(s) Oral daily  artificial tears (preservative free) Ophthalmic Solution 1 Drop(s) Right EYE every 4 hours  chlorhexidine 0.12% Liquid 15 milliLiter(s) Oral Mucosa every 12 hours  chlorhexidine 2% Cloths 1 Application(s) Topical <User Schedule>  doxazosin 8 milliGRAM(s) Oral at bedtime  erythromycin   Ointment 1 Application(s) Both EYES four times a day  fluticasone propionate 50 MICROgram(s)/spray Nasal Spray 1 Spray(s) Both Nostrils two times a day  influenza   Vaccine 0.5 milliLiter(s) IntraMuscular once  pantoprazole   Suspension 40 milliGRAM(s) Oral daily  petrolatum Ophthalmic Ointment 1 Application(s) Both EYES four times a day  polyethylene glycol 3350 17 Gram(s) Oral daily  propranolol 5 milliGRAM(s) Oral every 12 hours  senna 2 Tablet(s) Oral at bedtime  sodium chloride 0.65% Nasal 1 Spray(s) Both Nostrils two times a day    IVF:    CULTURES:  Culture Results:   No growth at 5 days (02-01 @ 19:45)    RADIOLOGY & ADDITIONAL TESTS:      ASSESSMENT:  44M PMH ?stroke/MI present to [facility] after collapsing at work. Decorticate posturing, vomiting, intubated for airway protection. 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 Transferred to [facility] for further management. s/p trach 10/14. s/p peg 10/21. Re-upgrade to ICU 2/2 desaturation event and suctioning requirements 11/15. Re-upgrade to NSICU 12/15 2/2 desaturation and tachycardia.      PLAN:  Neuro:  - neuro/vitals q4h  - pain control: tylenol prn  - seizure tx: keppra 1500mg BID, phenytoin 150/150/200  - s/p vEEG (10/3-4), (10/17-10/19), (12/17-12/18), (12/22-12/25), (1/26-1/28),  +focal motor seizures not correlating w/ EEG  - CTH 9/30: enlarged pontine hemorrhage, CTH 10/3: stable, CTH 10/25: mostly resolved pontine hemorrhage, CTH 12/15: R mastoid air cell opacification; acute otitis media vs sterile effusion, CTH 12/18: stable. CTH 1/21 stable, CTH 1/27 stable  - MRI brain 10/12: parenchymal hemorrhage, acute/subacute R cerebellar stroke      CV:  - -160  - tachycardia: propranolol 5mg BID   - HTN: amlodipine 5mg  - echo (9/30) EF 75%, repeat 12/16: 57%    PULM:  - pend trach exchange with pulm at bedside 2/14 to vent, AC/VC 40/400/14/6  - Secretions: duonebs/mucomyst/chest PT q6h     GI:  - TFs via PEG, [facility]  - bowel regimen, last BM 2/12  - baclofen 5q12 for hiccups    Renal:  - IVL  - FW 200q8 for hydration  - Voiding, SC prn  - CKD: trend BUN/Cr  - renal US 10/1, 10/8, 1/15: Increased bilateral renal echogenicity consistent with medical renal disease    Endo:  - A1c 5.4    Heme:  - DVT ppx: SCDs, SQH 5000u q8h   - LE dopplers negative 12/16    ID:  - last pan cx 1/27  - +klebsiella UTI s/p levaquin (1/30-2/3)  - empiric PNA: cefepime (12/22-12/30), s/p vanc (12/22-12/23), s/p zosyn (12/15-12/20), s/p vanc (12/15-12/16), s/p zosyn (1/12 -1/18)  - s/p Stenotrophomonas maltophilia PNA: s/p Bactrim (11/18-11/19) s/p Cefepime (11/16-11/18)  - 11/3, (+) sputum for stenotrophomas maltophlia, blood cx (+) klebsiella, cefepime 2gq12 (11/6 - 11/12)   - S/p Ancef (10/4-10/14) for PNA, and s/p Unasyn (10/18-10/23) +actinobacter baumanii     MISC:  - BL keratitis: BL erythromycin ointment, artificial tears, lacrilube q4, moisture chamber   - Penile wound: wound care rec barrier wipes     Dispo: SDU status, DNR, pending SNF    D/w Dr. D'Amico

## 2025-02-14 NOTE — PROCEDURE NOTE - NSBRONCHHISTORY_GEN_A_CORE_FT
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

## 2025-02-14 NOTE — PROCEDURE NOTE - SUPERVISORY STATEMENT
Trach change q3 mo as per .
I have personally reviewed the above clinical note and all of its components and:  [ ] agree with the above assessment and plan without modifications.  [x] agree with the above with modifications made to the assessment and/or plan of care.  [ ] disagree with the above with significant modifications as listed below:

## 2025-02-14 NOTE — PROGRESS NOTE ADULT - ASSESSMENT
44M with unclear PMH (?stroke, MI) who was found down at work, intubated for airway protection and found to have acute parenchymal hemorrhage within nabil with mass effect (+ acute/subacute right cerebellar infarct) in setting of hypertensive emergency, transferred to [facility] for further neurosurgical care. Hospital course c/b poor neurologic recovery s/p trach-PEG, AUR s/p gabriel, ANIKA on CKD c/b hyperkalemia, HAP s/p amp-sulbactam (EOT 10/23), K aerogenes bacteremia  treated with 2 weeks of Cefepime per ID , On 11/15 he became septic and was transferred to NSICU due to increased o2 requirements and needed Vent support , Trach Cultures + for Stenotrophomonas which was treated with 7 days of Bactrim per ID , has been weaned of Vent since 11/23 and is now on 10lts at 40% o2 through Trach Collar. Stepped up to the NSICU on 12/15 for hypoxia and tachycardia. PE ruled out. On abx for 5 days. Unclear etiology. Now stepped down to 8Lach.    # Pontine hemorrhage  # Encephalopathy due to Intracranial Bleed   - Acute parenchymal hemorrhage within nabil with mass effect (+ acute/subacute right cerebellar infarct) in setting of hypertensive emergency.   - MRI brain also demonstrated acute/subacute R cerebellar stroke.   - No Neurosurgical Interventions were performed   - Secondary to IPH and cerebellar stroke - Trach and PEG Dependent    # Klebsiella UTI - Resolved   - Urine cx 1/27 growing Klebsiella   - MRSA nares negative  - Completed 5 day course of Levaquin    # Seizures  - Continue Seizure precautions   - Continue Keppra and phenytoin - antiepileptic regimen per primary team and epilepsy    # Hypertension  - amlodipine 5mg daily , Propranolol  with holding parameters     # Chronic respiratory failure.   - s/p percutaneous trach by pulm on 10/14/24  - Currently on Vent Support   - Continue Chest PT  - Pulm Consulted for Trach Exchange     # Neurogenic dysphagia.   - Pt s/p PEG with surgery 10/21/24  - TF per nutrition  - aspiration precautions and elevation of HOB.    # Acute urinary retention.   - doxazosin 8mg qHS   - monitor urine output    # Functional quadriplegia in setting of brainstem hemorrhage  - decub precautions  - care per nursing protocol     # CKD stage 3 , Baseline Creatinine 1.1     # Hyperkalemia   -  monitor K , slowly trending up, only med that can cause elevation his heparin Sub q , administered Lokelma 5mg po x 1 on 2/12 ,- Switched to low potassium tube feeds on 2/12

## 2025-02-14 NOTE — PROCEDURE NOTE - NSBRONCHPROCDETAILS_GEN_A_CORE_FT
AMBU scope was entered thru tracheostomy tube, remains in satisfactory position.  Unable to advance Bronchoscope from Nasal or oral airway above trach due to thick clear secretions and vocal cord spasm despite adequate sedation   Patient tolerated procedure well with no complications and no bleeding   Trach exchanged using Seldinger technique   Bronchoscope placed again in new tracheostomy tube and was in satisfactory position  No bleeding noted   Post procedure Lung US done, lung sliding noted in all fields on left, on apical right side brief period noted of lung point, with further evaluation and US of reminder of segments lung point was not noted and there was lung sliding now at apical view   CXR was done post procedure that showed no ptx         Findings:  Edematous and erythematous mucosa in airways noted, sharp chris, moderate amount of thick mucoid secretions noted.

## 2025-02-15 LAB
ANION GAP SERPL CALC-SCNC: 10 MMOL/L — SIGNIFICANT CHANGE UP (ref 5–17)
BUN SERPL-MCNC: 43 MG/DL — HIGH (ref 7–23)
CALCIUM SERPL-MCNC: 9.2 MG/DL — SIGNIFICANT CHANGE UP (ref 8.4–10.5)
CHLORIDE SERPL-SCNC: 100 MMOL/L — SIGNIFICANT CHANGE UP (ref 96–108)
CO2 SERPL-SCNC: 25 MMOL/L — SIGNIFICANT CHANGE UP (ref 22–31)
CREAT SERPL-MCNC: 1.19 MG/DL — SIGNIFICANT CHANGE UP (ref 0.5–1.3)
EGFR: 76 ML/MIN/1.73M2 — SIGNIFICANT CHANGE UP
EGFR: 76 ML/MIN/1.73M2 — SIGNIFICANT CHANGE UP
GAS PNL BLDA: SIGNIFICANT CHANGE UP
GLUCOSE SERPL-MCNC: 87 MG/DL — SIGNIFICANT CHANGE UP (ref 70–99)
HCT VFR BLD CALC: 31.2 % — LOW (ref 39–50)
HGB BLD-MCNC: 9.9 G/DL — LOW (ref 13–17)
MAGNESIUM SERPL-MCNC: 2.2 MG/DL — SIGNIFICANT CHANGE UP (ref 1.6–2.6)
MCHC RBC-ENTMCNC: 30.5 PG — SIGNIFICANT CHANGE UP (ref 27–34)
MCHC RBC-ENTMCNC: 31.7 G/DL — LOW (ref 32–36)
MCV RBC AUTO: 96 FL — SIGNIFICANT CHANGE UP (ref 80–100)
NRBC BLD AUTO-RTO: 0 /100 WBCS — SIGNIFICANT CHANGE UP (ref 0–0)
PHOSPHATE SERPL-MCNC: 5 MG/DL — HIGH (ref 2.5–4.5)
PLATELET # BLD AUTO: 265 K/UL — SIGNIFICANT CHANGE UP (ref 150–400)
POTASSIUM SERPL-MCNC: 3.9 MMOL/L — SIGNIFICANT CHANGE UP (ref 3.5–5.3)
POTASSIUM SERPL-SCNC: 3.9 MMOL/L — SIGNIFICANT CHANGE UP (ref 3.5–5.3)
RBC # BLD: 3.25 M/UL — LOW (ref 4.2–5.8)
RBC # FLD: 12.6 % — SIGNIFICANT CHANGE UP (ref 10.3–14.5)
SODIUM SERPL-SCNC: 135 MMOL/L — SIGNIFICANT CHANGE UP (ref 135–145)
WBC # BLD: 6.09 K/UL — SIGNIFICANT CHANGE UP (ref 3.8–10.5)
WBC # FLD AUTO: 6.09 K/UL — SIGNIFICANT CHANGE UP (ref 3.8–10.5)

## 2025-02-15 PROCEDURE — 71045 X-RAY EXAM CHEST 1 VIEW: CPT | Mod: 26

## 2025-02-15 PROCEDURE — 99232 SBSQ HOSP IP/OBS MODERATE 35: CPT

## 2025-02-15 PROCEDURE — 99233 SBSQ HOSP IP/OBS HIGH 50: CPT

## 2025-02-15 RX ORDER — AMLODIPINE BESYLATE 10 MG/1
5 TABLET ORAL DAILY
Refills: 0 | Status: DISCONTINUED | OUTPATIENT
Start: 2025-02-15 | End: 2025-02-28

## 2025-02-15 RX ORDER — FUROSEMIDE 10 MG/ML
20 INJECTION INTRAMUSCULAR; INTRAVENOUS ONCE
Refills: 0 | Status: COMPLETED | OUTPATIENT
Start: 2025-02-15 | End: 2025-02-15

## 2025-02-15 RX ADMIN — ERYTHROMYCIN 1 APPLICATION(S): 5 OINTMENT OPHTHALMIC at 17:49

## 2025-02-15 RX ADMIN — Medication 150 MILLIGRAM(S): at 11:13

## 2025-02-15 RX ADMIN — Medication 1 DROP(S): at 06:00

## 2025-02-15 RX ADMIN — LEVETIRACETAM 1500 MILLIGRAM(S): 10 INJECTION, SOLUTION INTRAVENOUS at 06:00

## 2025-02-15 RX ADMIN — IPRATROPIUM BROMIDE AND ALBUTEROL SULFATE 3 MILLILITER(S): .5; 2.5 SOLUTION RESPIRATORY (INHALATION) at 11:13

## 2025-02-15 RX ADMIN — ACETYLCYSTEINE 4 MILLILITER(S): 200 INHALANT RESPIRATORY (INHALATION) at 23:49

## 2025-02-15 RX ADMIN — ERYTHROMYCIN 1 APPLICATION(S): 5 OINTMENT OPHTHALMIC at 11:14

## 2025-02-15 RX ADMIN — IPRATROPIUM BROMIDE AND ALBUTEROL SULFATE 3 MILLILITER(S): .5; 2.5 SOLUTION RESPIRATORY (INHALATION) at 17:45

## 2025-02-15 RX ADMIN — FLUTICASONE PROPIONATE 1 SPRAY(S): 50 SPRAY, METERED NASAL at 05:59

## 2025-02-15 RX ADMIN — FLUTICASONE PROPIONATE 1 SPRAY(S): 50 SPRAY, METERED NASAL at 17:50

## 2025-02-15 RX ADMIN — Medication 1 DROP(S): at 17:49

## 2025-02-15 RX ADMIN — Medication 1 APPLICATION(S): at 18:16

## 2025-02-15 RX ADMIN — Medication 1 APPLICATION(S): at 11:41

## 2025-02-15 RX ADMIN — ACETYLCYSTEINE 4 MILLILITER(S): 200 INHALANT RESPIRATORY (INHALATION) at 17:45

## 2025-02-15 RX ADMIN — Medication 200 MILLIGRAM(S): at 19:43

## 2025-02-15 RX ADMIN — Medication 15 MILLILITER(S): at 17:46

## 2025-02-15 RX ADMIN — IPRATROPIUM BROMIDE AND ALBUTEROL SULFATE 3 MILLILITER(S): .5; 2.5 SOLUTION RESPIRATORY (INHALATION) at 06:00

## 2025-02-15 RX ADMIN — HEPARIN SODIUM 5000 UNIT(S): 1000 INJECTION INTRAVENOUS; SUBCUTANEOUS at 14:47

## 2025-02-15 RX ADMIN — HEPARIN SODIUM 5000 UNIT(S): 1000 INJECTION INTRAVENOUS; SUBCUTANEOUS at 06:00

## 2025-02-15 RX ADMIN — Medication 1 DROP(S): at 02:40

## 2025-02-15 RX ADMIN — ACETYLCYSTEINE 4 MILLILITER(S): 200 INHALANT RESPIRATORY (INHALATION) at 11:14

## 2025-02-15 RX ADMIN — AMLODIPINE BESYLATE 5 MILLIGRAM(S): 10 TABLET ORAL at 06:01

## 2025-02-15 RX ADMIN — Medication 15 MILLILITER(S): at 06:05

## 2025-02-15 RX ADMIN — Medication 1 DROP(S): at 14:47

## 2025-02-15 RX ADMIN — POLYETHYLENE GLYCOL 3350 17 GRAM(S): 17 POWDER, FOR SOLUTION ORAL at 11:13

## 2025-02-15 RX ADMIN — ACETYLCYSTEINE 4 MILLILITER(S): 200 INHALANT RESPIRATORY (INHALATION) at 06:44

## 2025-02-15 RX ADMIN — Medication 1 SPRAY(S): at 05:59

## 2025-02-15 RX ADMIN — Medication 1 DROP(S): at 21:37

## 2025-02-15 RX ADMIN — DOXAZOSIN MESYLATE 8 MILLIGRAM(S): 8 TABLET ORAL at 21:39

## 2025-02-15 RX ADMIN — Medication 150 MILLIGRAM(S): at 04:51

## 2025-02-15 RX ADMIN — FUROSEMIDE 20 MILLIGRAM(S): 10 INJECTION INTRAMUSCULAR; INTRAVENOUS at 16:46

## 2025-02-15 RX ADMIN — HEPARIN SODIUM 5000 UNIT(S): 1000 INJECTION INTRAVENOUS; SUBCUTANEOUS at 21:38

## 2025-02-15 RX ADMIN — Medication 1 APPLICATION(S): at 06:44

## 2025-02-15 RX ADMIN — Medication 1 DROP(S): at 10:49

## 2025-02-15 RX ADMIN — IPRATROPIUM BROMIDE AND ALBUTEROL SULFATE 3 MILLILITER(S): .5; 2.5 SOLUTION RESPIRATORY (INHALATION) at 23:49

## 2025-02-15 RX ADMIN — Medication 1 SPRAY(S): at 17:50

## 2025-02-15 RX ADMIN — BACLOFEN 5 MILLIGRAM(S): 10 INJECTION INTRATHECAL at 17:46

## 2025-02-15 RX ADMIN — ERYTHROMYCIN 1 APPLICATION(S): 5 OINTMENT OPHTHALMIC at 05:59

## 2025-02-15 RX ADMIN — BACLOFEN 5 MILLIGRAM(S): 10 INJECTION INTRATHECAL at 06:01

## 2025-02-15 RX ADMIN — Medication 2 TABLET(S): at 21:38

## 2025-02-15 RX ADMIN — Medication 40 MILLIGRAM(S): at 11:14

## 2025-02-15 RX ADMIN — LEVETIRACETAM 1500 MILLIGRAM(S): 10 INJECTION, SOLUTION INTRAVENOUS at 17:46

## 2025-02-15 NOTE — PROGRESS NOTE ADULT - SUBJECTIVE AND OBJECTIVE BOX
HPI:  Unknown age male, unknown past medical history (reported stroke and MI by coworkers) presented to [facility] with AMS, Pt was working at [employer] when he was found down by coworkers. EMS called and pt brought to [facility] ED. Intubated, sedated, started on cardene for SBPs in 200s. CT head showed brain stem bleed. Transferred to NSICU for further management.  (30 Sep 2024 12:55)    OVERNIGHT EVENTS: GEORGE overnight, neuro stable.    Hospital Course:   9/30: transferred from [facility]. A line placed. Versed dc'd. Cy Rader at bedside, states pt has family in [place], cannot confirm medications or PMH other than stroke and MI. 250cc bolus 3% given. LR switched to NS. hydralazine 25q8 started, 3% started, switched propofol to precedex   10/1: stability CTH done. Added labetalol, started TF. Palliative consulted. ethics consulted to determine surrogate. febrile 103, pan cx sent  10/2: BD 2, GEORGE overnight. TF resumed. Desatt'd to 80s, FiO2 inc. to 50. Fentanyl given, ABG, CXR ordered. Maxxed on precedex, started on propofol for DARIEN -4 - -5. Precedex dc'd. Duonebs, mucomyst, hypertonic added. 3% dc'd. Cardene dc'd. Start vanc/CTX. Increased labetalol 200q8. MRSA negative, dc'd vanc. ETT pulled back 2cm x 2, good positioning after confirmatory chest xray. Ethics attempting to establish HCP with family. Na 159, starting FW 250q6 for range 150-155.   10/3: BD3, GEORGE o/n, neuro stable. Na elevating, FW increased to 300q6. Dc'd bowel reg for diarrhea. vEEG started. SQH 5000q8 tonight.   10/4: BD 4, albumn bolus, incr. LR to 80 2/2 incr. in Cr, LR to 10 0cc/hr for uptrending Cr. Started 7% hypersal for 48hrs and SL atropine for inline/oral thick secretions. Dc'd CTX and started ancef for MSSA in the sputum. Nephrology consulted for CKD, f/u recs. SBP 170s, given hydralazine 10mg IVP.   10/5: BD5, o/n 10mg IVP hydralazine given for SBP 170s and started on hydralazine 25q8 via OGT. 10mg IV push labetalol for SBP > 160s. RT placed for diarrhea.   10/6: BD6, o/n FW increased to 350q4 per nephrology recs. IV tylenol for temp 100.6, SBp 160s presumed uncomfortable.   10/7: BD7, overnight pancultured for temp 101.8F.   10/8: BD8. GEORGE. Cr bumped. decreased LR to 75cc/hr. Adding simethicone ATC. incr hydralazine 50mgTID. Incr labetalol 300mgTID. Na 145, decreased FWF to 250q6. Start precedex. FENa consistent with intrinsic kidney injury. Pend repeat renal US. Retaining up to 1.3L, bladder scans q6, straight cath PRN  10/9: BD 9. GEORGE overnight. Neuro stable. abd xray for distention w non-specific gas pattern, OGT to LIWS for morning. duonebs/mucomyst to q8 for improving secretions. Changed tube feeds to Jevity 1.5 20cc/hr, low rate due to abdominal distention, nepro dense and more difficult to digest. Tolerating CPAP, confirmed by ABG.   10/10: BD 10. GEORGE overnight. Neuro stable. (+) gabriel for urinary retention on bladder scan. inc TF to goal rate of 40cc/hr. family leaning toward pursuing trach/PEG. 1/2 amp for FS 81.   10/11: BD 11. GEORGE overnight. Neuro stable. Trach/PEG consults placed.   10/12: BD 12. GEORGE overnight. Neuro stable. MRI brain complete.   10/13: BD 13. Increase flomax. Hold SQH after PM dose for trach tm. IVL.   10/14: BD 14. GEORGE overnight, remains on AC/VC. Gabriel placed for urinary retention. Dc'd free water.  S/p trach with pulm. NGT placed and CXR confirmed in good position.   10/15: BD 15, GEORGE ovn. resumed feeds. spiked 101, pan cx sent.   10/16: BD 16. GEORGE ovn. Lokelma 5mg for K+ 5.4. Started vanc q 24/zosyn for empiric PNA coverage, IVF to 100/hr. PEG held for fever.   10/17: BD 17,  ordered serum osm and urine osm for am. Started sinemet for neurostimulation. Increased cardura to 0.8. Started FW 100q4, dc'd IVF. MRSA negative, dc'd vanc. NGT replaced d/t coiling.   10/18: BD 18, GEORGE overnight, neuro stable. Amantadine added for neurostim. zosyn changed to unasyn for acinetobacter baumannii, failed TOV and required SC  10/19: BD 19, GEORGE ovn. cardura 2mg added for retention. labetalol decreased 200q8, hydralazine decreased 25q8. Gabriel replaced.   10/20: BD20, GEORGE overnight. NGT dislodged, replaced. PEG tomorrow w/ gen surg, FW increased to 150q4 and labetalol decreased to 100q8, lokelma given for hyperkalemia.   10/21: BD 21. POD0 PEG placement with Gen surg. decr labetolol to 50q8, incr. cardura to 0.4, started lokelma and phoslo, dc gabriel POD0 PEG placement with Gen surg.  10/22: BD 22. Plan to start TF today via PEG. dc labetalol, Following ophtho recs. Increased apnea settings - found to be in cheyne-moe respiration. CPAP 5/5.  10/23: BD 23. hydralazine d/c'd, trach collar trial today. Rectal tube placed at 6am.  10/24: BD24, o/n lokelma held due to diarrhea. Free water 100q6 resumed. dc'd tamsulosin, amantadine. Incr'd cardura to 8mg qhs. Dc'd FW. Switched jevity to nepro. gabriel placed for high urine output. Started SL atropine for oral secretions. Dc'd free water.  10/25: BD25, o/n decreased suctioning requirements to > q4hrs, GEORGE. Cr improving, cont phoslo, lokelma held at this time. Gabriel placed yest, cont. Tolerating trach collar. Given 500cc plasmalyte bolus for ANIKA. Dc'd sinemet.   10/26: BD26, o/n resumed lokelma 5mg daily and resumed 100cc free water q6hrs. Change in neuro status with new right pupillary dilation with anisocoria (right pupil 6mm fixed and left pupil 3mm briskly reactive). Given 23.4% NaCl bullet, taken for emergent CTH showing mostly resolved pontine hemorrhage, continued brainstem hypodensity likely edema d/t hemorrhage, no new hemorrhage or infarct, no herniation, mild increase in size of left lateral ventricle. Vitals remaine stable. Na goal > 140.   10/27: BD27, o/n GEORGE.Neuro stable. Pend stepdown with airway bed.   10/28: BD 28. GEORGE overnight. Neuro stable. Miralax ordered. Gabriel removed, pending TOV.  10/29: BD 29. GEORGE o/n. Given 2L NS over 8 hrs for increased BUN/Cr ratio. Gabriel placed for frequent straight cath.   10/30: BD 30.   10/31: BD 31. GEORGE overnight. Na 149, increased free water to 200q6. 1L NS for uptrending BUN.   11/1: BD 32. GEORGE overnight. Given 1L NS for dehydration. Na 146, increased FW to 250q6.   11/2: BD 33 GEORGE overnight, neuro stable, given 500cc bolus for net negative status and tachycardia   11/3: BD 34, GEORGE overnight, neuro stable. Patient remains tachycardic, EKG showing sinus tachycardia, given additional 500cc NS bolus. Febrile to 101.9F, pan cultured (without UA), CXR WNL, given tylenol.   11/4: BD 35, GEORGE overnight, neuro stable. Given 1L NS for tachycardia. sputum (+) for stenotropohomas maltophilia.   11/5: BD 36 GEORGE overnight, neuro stable. Vancomycin dc'd. Chest PT BID. ID consulted, cont zosyn.  11/6: BD 37. blood cx + klebsiella dc zosyn changed to cefepime, CTAP ordered, rpt blood cx sent.    11/7: BD 38. Pending CT A/P, given 250cc bolus and starting maintenance fluids overnight. Pending CT A/P after bolus   11/8: BD 39. CT CAP negative for infection.   11/9: BD 40. GEORGE overnight.  11/10: BD 41. GEORGE overnight. desat to 85 on trach collar, O2 inc to 10L and 100%, O2 sat inc to 95. pt tachy to 110s, euvolemic. given tylenol. ABG and CXR ordered. spiked fever, pancultured, RVP negative. AM ABG w pO2 79, rpt w pO2 79. pt appears comfortable, satting 94%.   11/11: BD 42. GEORGE overnight. pt became tachy to 130s, desat to 90 on 100% FiO2 and 10L. suctioned, (+) productive cough. temp 101.4, given 1g IV tylenol and 500cc NS bolus for euvolemia. fever and HR downtrending. LE dopplers negative for dvt  11/12: BD 43, GEORGE ovn, fever and HR downtrending, satting 97% 70% FIO2  11/13: BD 44, GEORGE ovn. started standing tylenol x24 hours for tachycardia. desat to 80s, o2 increased. CXR stable, pending CTA PE protocol.   11/14: BD 45, GEORGE overnight, neuro stable. resp therapy dec FiO2 to 70%.   11/15: BD 46, GEORGE overnight, neuro stable.  Rapid called for desaturation 30s, tachycardic 140s. Patient bagged, 100% fio2, heavily suctioned. CXR/POCUS unremarkable. ABG c/w desaturation. WBC 14.71. Afebrile. O2 improved to 90s and patient upgraded to ICU. ABG paO2 30s improved to 89 on vent. IV Tylenol x 1, sputum sent. Start protonix while o-n vent.   11/16: BD 47. POCUS showed collapsable IVCF, given 1L bolus. Vanco/Cefpime added empriic for PNA, NGT feeds restarted. MRSA swab neg, Vanc DC'd.   11/17: BD 48. GEORGE overnight. 1l bolus for tachycardia. Spiked to 101, cultured. 500cc bolus for tachycardia, tachy to 148 given 25mcg fentanyl, 250cc albumin, 1.5L bolus. 5 IV lopressor with response HR to 100s. +Stenotrophomonas on sputum cx.   11/18: BD 49. GEORGE overnight. Consulted ID, cefepime switched to bactrim x7days. Started hydrochlorothiazine 12.5mg daily.  11/19: BD 50. Tachy 120s, given tylenol and 500cc NS. Tolerating 5/5, switched to TCx. Holding phos binder. D/c Bactrim. D/c gabriel, f/u TOV. Dc'd PPI.   11/20: BD 51. GEORGE ovn. 1600 satting low 90s, mildly tachy to 110s, afebrile, RR wnl. O2 improved to mid 90s while inc O2 to 100% on TCx. CPAP 5/5 placed back on.  11/21: BD 52, GEORGE ovn, tolerating CPAP 5/5. Switch to trach collar during the day if tolerating well. HCTZ held for Cr bump, straight cath frequence increased to q4  11/22: BD 53, GEORGE ovn. Resumed phoslo. Gabriel placed. Resumed HCTZ.   11/23: BD 54. Holding tylenol in setting of possible fever, will require pan cx if febrile. Cr improved today. Cont CPAP. Bowel regimen held i/s/o diarrhea. FOBT negative.  11/24: BD 55. GEORGE overnight. Neuro stable. HCTZ dc'ed, started lisinopril 5. Lokelma dc'ed for K 3.7.   11/25: BD 56, GEORGE overnight. Neuro stable. dc'd lisinopril 5mg. Gabriel dc'd. TOV. 1545 noted to be hypotensive, MAP 50, in supine position on chair, HR 60s, afebrile, O2 96%. Given 1L cc NS bolus, placed back on bed in reverse trendelenberg, improved to map of 66. Neostick at bedside. Vitals check q1h. Dc'ed amlodipine. Failed TOV, bladder scan q6, sc prn. Added back Senna.   11/26: BD 57, GEORGE overnight. Neuro stable. Dc'd phoslo.   11/27: BD 58, GEORGE overnight. Neuro stable.    11/28: BD 59. Gabriel replaced.   11/29: GEORGE.  11/30: GEORGE, neuro stable.   12/1: BD 62, GEORGE overnight  12/2: BD 63, GEORGE overnight.; Given 1L bolus of LR for uptrending BUN/Cr.  12/3: BD 64. Reinstated eye gtt/moisture chamber given increased Rt eye injection  12/4: BD 65. GEORGE overnight. Attempted to speak with ophtho regarding eyelid weight/closure but no answer, full mailbox.   12/5: BD 66, GEORGE overnight, bowel regimen increased and had BM.   12/6: BD 67, GEORGE overnight, neuro stable.  12/7: GEORGE overnight, neuro stable. /110s, given x1 hydralazine 10 mg IVP. Restarted home amlodipine 5mg.  12/8: GEORGE. OOB to chair.     12/11: GEORGE, mucomyst added for thick secretions, simethicone for abd distension, abd xray with stool burden, increased bowel regimen.   12/12: GEORGE overnight.   12/13: GEORGE overnight.   12/14: GEORGE overnight  12/15: o/n Patient became tachycardic HR 120s and 10 minutes later O2 sat dropped as low as 89%. Patient suctioned without improvement in O2 sat and tube feeds found in suction catheter. TFs held.  STAT CXR ordered. STAT labs sent. Respiratory therapist called to bedside and patient trach connected to ventilator. After connection to ventilator and further suctioning O2 sat improved to 97% but patient HR remains 120-130s. Upgraded to NSICU for further management. Vancomycin and zosyn started. CTA  chest PE protocol and CTH ordered. Blood cultures sent.given 500cc bolus, rpt ABG sent pO2 243, CTH and CTA chest done. FS while NPO, FiO2 dec 50 pending ABG. sputum cx positive for few GPC and GNR.   12/16: GEORGE overnight, restarted amlodipine, troponin 75   12/17: GEORGE overnight, neuro stable. Attempted CPAP this morning, did not tolerate and back on full vent support. Dc'd Vanc. Tachycardia to 140s, noted extremities to be twitching along with jaw twitching. Given 2g of Keppra, total 4mg ativan and placed on EEG, full set of labs and lactate negative. Resumed trickle feeds at 20cc/hr. Given 250cc albumin for tachycardia.   12/18: GEORGE overnight. Neuro stable. CTH stable. EEG negative and dc'd.   12/19: GEORGE overnight. neuro stable. SIMV most of day, AC/VC at night.   12/20: GEORGE overnight, neuro stable. remains on VC/AC  12/21: GEORGE ovn. 1L bolus for tachy to 120s-130s.   12/22: GEORGE ovn. Tachy to 120s, given tylenol and IVF. 2mg IV ativan given for L jaw twitching. Sx resolved for 3 minutes and started again. Epilepsy contacted. Given 2mg IV ativan. Continued twitching. Increased keppra to 1500mg BID, 2mg ativan given. Propranolol started for refractory tachycardia. vEEG ordered. albumin given. POCUS performed, no b lines. Started on fosphenytoin, loaded w 20mg/kg then 100mg TID. febrile, pancx, started cefepime 2g q8, vancomycin  12/23: GEORGE ovn. +focal motor seizures on eeg. ID consulted. Vancomycin dc'ed as per ID.  12/24: GEORGE ovn, neuro stable. Baclofen 5 mg q12 started for hiccups. Na 129 from 134. Urine lytes c/w SIADH. Given 250cc 3% bolus. CT chest for infection w/u - f/u read. Repeat Na 136. UA negative  12/25: GEORGE ovn.   12/26: GEORGE ovn  12/27: GEORGE overnight. Blood cx neg @ 4 days, DC cefepime per medicine (sputum colonized).   12/28: Desaturation o/n to 80's, CXR obtained, pulse ox changed and sats resolved. Na 133 from 138, 250cc 3% bolus given. Cefepime resumed until 12/30 per ID. Rpt Na 138.  12/29: GEORGE overnight. Na stable.   12/30: GEORGE overnight. Family meeting with son, pt now DNR.   12/31: GEORGE overnight.   1/1: GEORGE overnight, neuro stable.  1/2: GEORGE overnight, neuro stable. FW decreased to 100q6.   1/3: GEORGE overnight, neuro stable. fosphenytoin IV changed to pheytoin PO via PEG per epilepsy recommendations  1/4: GEORGE overnight, neuro stable. FW incr. to 100q4  1/5: GEORGE overnight, neuro stable.   1/6: GEORGE overnight, neuro stable   1/7: GEORGE overnight, neuro stable. BUN/Cr increased, increased FW to 200q6. Given 1L bolus of LR over 2 hours. Gabriel d/c'd, voiding, continue bladder scan q6.   1/8: GEORGE overnight, neuro stable. BUN/Cr improving.  1/9: GEORGE overnight, neuro stable.   1/10: GEORGE overnight, neuro stable.   1/11: GEORGE overnight, neuro stable, vent settings stable.   1/12: GEORGE overnight, neuro stable. Febrile to 102F, f/u pan cx, chest xray, given tylenol. Zosyn started.   1/13: GEORGE overnight, neuro stable, cont Zosyn for presumed PNA, prelim sputum cx growing; few GS neg diplococci, mod GS neg rods, rare GS + rods, fever trend improved. Free water increased to 530aed7.   1/14: GEORGE overnight. pending renal US for elevated Cr  1/15: GEORGE ovn. renal US complete.   1/16: GEORGE overnight, neuro stable   1/17: GEORGE overnight, neuro stable   1/18: GEORGE overnight, neuro stable.   1/19: GEORGE overnight, neuro stable. Propranolol dec to 5q8.   1/20: GEORGE overnight, neuro stable. Weaned propranolol to 5mg BID.   1/21: GEORGE ovn, in AM having rapid vertical eye movements with facial twitching, 2g total keppra given for AM dose and phenytoin level ordered, vital signs stable. CTH stable. Phenytoin increased to 100/100/150mg per epilepsy  1/22: GEORGE ovn. IV hydral x 1 for SBP 170s.   1/23: Cont to have focal motor seizures. Per epilepsy, changed phenytoin dose to 100/100/200.   1/24: Phenytoin lvl tmrw AM. Chest Xray completed due to temp 100.2F  1/25: GEORGE overnight. Incr dilantin morning and afternoon per epilepsy.   1/26: GEORGE overnight. Sustained focal twitching noticed by nursing. Ativan 8mg in total given. Fosphenytoin 1500mg IV given. Placed on EEG. Epilepsy following. TFs held. Phenytoin increased to 150/150/200.   1/27: o/n CTH completed due to continued lethargy after ativan given yesterday afternoon. TFs resumed. 500cc bolus NS given for SBP 90s. EEG dc'ed, discussed w/ Dr. Tomlin. Febrile 101F, pan cx sent. Likely left sided infiltrate on CXR, c/f aspiration PNA. Started on vanc/zosyn. MRSA swab pending.   1/28: Neuro stable, on vanc/zosyn. +UTI on UA, MRSA negative. Vanc dc'd. RVP negative. Zosyn increased to pseudomonal dosing.   1/29: GEORGE overnight, neuro stable.  1/30: GEORGE overnight, neuro stable. Dc'd zosyn due to resistance, switched to Levaquin.  1/31: GEORGE ovenight, neuro stable.   2/1: Brief desat. Improved with neb and reposititioning. ABG and POCUS wnl.   2/4: GEORGE overnight  2/5: GEORGE overnight  2/6: GEORGE ovn  2/7: GEORGE ovn. L eye redness noted, started erythromycin and lacrilube to L eye as well. LR @ 100 cc/hr x 10 hours for uptrending BUN/Cr. Resumed bowel reg.   2/8: GEORGE overnight. Escalated bowel regimen.   2/9: GEORGE overnight.  2/10: GEORGE overnight. Social work to start working on SNF placement. Pending appointment with [employer] to come to hospital for biometrics.  2/11: GEORGE overnight. Neuro stable. Potassium improved (downtrending).   2/12: GEORGE overnight. Neuro stable. Lokelma 5mg x 1. Decrease FW to 200q8. 1L NS bolus given for hydration, uptrending BUN/Cr. Wound care rec barrier wipes for penile wound.  TFs changed to [employer] renal formula per nutrition.  2/13: GEORGE overnight. Neuro stable. Corneal abrasion noted on exam, ophtho re-consulted for L eye, eye drops changed per recs to q4. Pulm contacted for trach exchange, attempted at bedside but patient unable to tolerate with minimal sedation, will plan for tomorrow.   2/14: GEORGE overnight, given 1L of NS for low BP after fentanyl with improvement. Neuro exam stable, pend trach exchange today with pulm. Pend optho assessment for left eye. Trache exchanged. Desaturation during placement to 90%. FiO2 incr'd 55%. CXR negative for pneumothorax.   2/15: GEORGE overnight.       Vital Signs Last 24 Hrs  T(C): 36.7 (14 Feb 2025 21:59), Max: 36.9 (14 Feb 2025 05:00)  T(F): 98.1 (14 Feb 2025 21:59), Max: 98.5 (14 Feb 2025 05:00)  HR: 66 (14 Feb 2025 23:50) (60 - 86)  BP: 113/84 (14 Feb 2025 23:50) (103/69 - 143/99)  BP(mean): 93 (14 Feb 2025 23:50) (81 - 117)  RR: 14 (14 Feb 2025 23:50) (14 - 24)  SpO2: 97% (14 Feb 2025 23:50) (92% - 100%)    Parameters below as of 14 Feb 2025 23:50  Patient On (Oxygen Delivery Method): ventilator    O2 Concentration (%): 40    I&O's Summary    13 Feb 2025 07:01  -  14 Feb 2025 07:00  --------------------------------------------------------  IN: 2462 mL / OUT: 2150 mL / NET: 312 mL    14 Feb 2025 07:01  -  15 Feb 2025 00:03  --------------------------------------------------------  IN: 1826 mL / OUT: 1600 mL / NET: 226 mL        PHYSICAL EXAM:  Constitutional: NAD, lying in bed.  HEENT: Pupils equal, round, reactive to light.   Respiratory: +Trach to mechanical vent. No respiratory distress, symmetric chest rise  Cardiovascular: Regular rate and rhythm    Gastrointestinal: +PEG, Abdomen soft, nondistended.  Neurological:  AAOX0-1. Opens eyes. Tracks vertically  Motor: RUE squeezes hand to comman, LUE 0/5, B/l LE w/d.   Sensation: unable to assess  Extremities: Warm, well perfused.      TUBES/LINES:  [] Gabriel  [] Lumbar Drain  [] Wound Drains  [] Others  [x] trach  [x] peg     DIET:  [] NPO  [] Mechanical  [x] Tube feeds    LABS:                        10.0   6.14  )-----------( 261      ( 14 Feb 2025 05:30 )             32.2     02-14    135  |  100  |  46[H]  ----------------------------<  110[H]  4.3   |  25  |  1.17    Ca    9.1      14 Feb 2025 05:30  Phos  4.7     02-14  Mg     2.3     02-14        Urinalysis Basic - ( 14 Feb 2025 05:30 )    Color: x / Appearance: x / SG: x / pH: x  Gluc: 110 mg/dL / Ketone: x  / Bili: x / Urobili: x   Blood: x / Protein: x / Nitrite: x   Leuk Esterase: x / RBC: x / WBC x   Sq Epi: x / Non Sq Epi: x / Bacteria: x          CAPILLARY BLOOD GLUCOSE          Drug Levels: [] N/A    CSF Analysis: [] N/A      Allergies    Allergy Status Unknown    Intolerances      MEDICATIONS:  Antibiotics:    Neuro:  acetaminophen   Oral Liquid .. 650 milliGRAM(s) Oral every 6 hours PRN  baclofen 5 milliGRAM(s) Oral every 12 hours  fentaNYL   Infusion. 0.5 MICROgram(s)/kG/Hr IV Continuous <Continuous>  levETIRAcetam 1500 milliGRAM(s) Oral every 12 hours  LORazepam   Injectable 2 milliGRAM(s) IV Push once PRN  phenytoin   Suspension 150 milliGRAM(s) Oral <User Schedule>  phenytoin   Suspension 200 milliGRAM(s) Oral <User Schedule>    Anticoagulation:  heparin   Injectable 5000 Unit(s) SubCutaneous every 8 hours    OTHER:  acetylcysteine 10%  Inhalation 4 milliLiter(s) Inhalation every 6 hours  albuterol/ipratropium for Nebulization 3 milliLiter(s) Nebulizer every 6 hours  amLODIPine   Tablet 5 milliGRAM(s) Oral daily  artificial tears (preservative free) Ophthalmic Solution 1 Drop(s) Right EYE every 4 hours  chlorhexidine 0.12% Liquid 15 milliLiter(s) Oral Mucosa every 12 hours  chlorhexidine 2% Cloths 1 Application(s) Topical <User Schedule>  doxazosin 8 milliGRAM(s) Oral at bedtime  erythromycin   Ointment 1 Application(s) Both EYES four times a day  fluticasone propionate 50 MICROgram(s)/spray Nasal Spray 1 Spray(s) Both Nostrils two times a day  influenza   Vaccine 0.5 milliLiter(s) IntraMuscular once  pantoprazole   Suspension 40 milliGRAM(s) Oral daily  petrolatum Ophthalmic Ointment 1 Application(s) Both EYES four times a day  phenylephrine   100 mCg/mL NaCl 0.9% Injectable 100 MICROGram(s) IV Push once  polyethylene glycol 3350 17 Gram(s) Oral daily  propranolol 5 milliGRAM(s) Oral every 12 hours  senna 2 Tablet(s) Oral at bedtime  sodium chloride 0.65% Nasal 1 Spray(s) Both Nostrils two times a day    IVF:    CULTURES:  Culture Results:   Moderate Stenotrophomonas maltophilia  Commensal iram consistent with body site (02-13 @ 20:26)  Culture Results:   No growth at 5 days (02-01 @ 19:45)    RADIOLOGY & ADDITIONAL TESTS:      ASSESSMENT:  44M PMH ?stroke/MI present to [facility] after collapsing at work. Decorticate posturing, vomiting, intubated for airway protection. 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 Transferred to [facility] for further management. s/p trach 10/14. s/p peg 10/21. Re-upgrade to ICU 2/2 desaturation event and suctioning requirements 11/15. Re-upgrade to NSICU 12/15 2/2 desaturation and tachycardia.          PLAN:    PLAN:  Neuro:  - neuro/vitals q4h  - pain control: tylenol prn  - seizure tx: keppra 1500mg BID, phenytoin 150/150/200  - s/p vEEG (10/3-4), (10/17-10/19), (12/17-12/18), (12/22-12/25), (1/26-1/28),  +focal motor seizures not correlating w/ EEG  - CTH 9/30: enlarged pontine hemorrhage, CTH 10/3: stable, CTH 10/25: mostly resolved pontine hemorrhage, CTH 12/15: R mastoid air cell opacification; acute otitis media vs sterile effusion, CTH 12/18: stable. CTH 1/21 stable, CTH 1/27 stable  - MRI brain 10/12: parenchymal hemorrhage, acute/subacute R cerebellar stroke      CV:  - -160  - tachycardia: propranolol 5mg BID   - HTN: amlodipine 5mg  - echo (9/30) EF 75%, repeat 12/16: 57%    PULM:  - pend trach exchange with pulm at bedside 2/14 to vent, AC/VC 40/400/14/6  - Secretions: duonebs/mucomyst/chest PT q6h     GI:  - TFs via PEG, [facility]  - bowel regimen, last BM 2/12  - baclofen 5q12 for hiccups    Renal:  - IVL  - FW 200q8 for hydration  - Voiding, SC prn  - CKD: trend BUN/Cr  - renal US 10/1, 10/8, 1/15: Increased bilateral renal echogenicity consistent with medical renal disease    Endo:  - A1c 5.4    Heme:  - DVT ppx: SCDs, SQH 5000u q8h   - LE dopplers negative 12/16    ID:  - last pan cx 1/27  - +klebsiella UTI s/p levaquin (1/30-2/3)  - empiric PNA: cefepime (12/22-12/30), s/p vanc (12/22-12/23), s/p zosyn (12/15-12/20), s/p vanc (12/15-12/16), s/p zosyn (1/12 -1/18)  - s/p Stenotrophomonas maltophilia PNA: s/p Bactrim (11/18-11/19) s/p Cefepime (11/16-11/18)  - 11/3, (+) sputum for stenotrophomas maltophlia, blood cx (+) klebsiella, cefepime 2gq12 (11/6 - 11/12)   - S/p Ancef (10/4-10/14) for PNA, and s/p Unasyn (10/18-10/23) +actinobacter baumanii     MISC:  - BL keratitis: BL erythromycin ointment, artificial tears, lacrilube q4, moisture chamber   - Penile wound: wound care rec barrier wipes     Dispo: SDU status, DNR, pending SNF    D/w Dr. D'Amico

## 2025-02-15 NOTE — PROGRESS NOTE ADULT - ASSESSMENT
44M with unclear PMH (?stroke, MI) who was found down at work, intubated for airway protection and found to have acute parenchymal hemorrhage within nabil with mass effect (+ acute/subacute right cerebellar infarct) in setting of hypertensive emergency, transferred to [facility] for further neurosurgical care. Hospital course c/b poor neurologic recovery s/p trach-PEG, AUR s/p gabriel, ANIKA on CKD c/b hyperkalemia, HAP s/p amp-sulbactam (EOT 10/23), K aerogenes bacteremia  treated with 2 weeks of Cefepime per ID , On 11/15 he became septic and was transferred to NSICU due to increased o2 requirements and needed Vent support , Trach Cultures + for Stenotrophomonas which was treated with 7 days of Bactrim per ID , has been weaned of Vent since 11/23 and is now on 10lts at 40% o2 through Trach Collar. Stepped up to the NSICU on 12/15 for hypoxia and tachycardia. Pt s/p  abx for 5 days. Pt now stepped down to 8Lach.    # Pontine hemorrhage  # Encephalopathy due to Intracranial Bleed   - Acute parenchymal hemorrhage within nabil with mass effect (+ acute/subacute right cerebellar infarct) in setting of hypertensive emergency.   - MRI brain also demonstrated acute/subacute R cerebellar stroke.   - No Neurosurgical Interventions were performed   - Secondary to IPH and cerebellar stroke - Trach and PEG Dependent    # Seizures  - Continue Seizure precautions   - Continue Keppra and phenytoin - antiepileptic regimen per primary team and epilepsy    # Hypertension  - Continue amlodipine 5mg daily with holding parameters  -Continue Propranolol with holding parameters     # Chronic respiratory failure.   - s/p percutaneous trach by pulm on 10/14/24  - Currently on Vent Support   - Continue Chest PT  - Pulm Consulted for Trach Exchange     # Neurogenic dysphagia.   - Pt s/p PEG with surgery 10/21/24  - TF per nutrition  - aspiration precautions and elevation of HOB.    # Acute urinary retention.   - doxazosin 8mg qHS   - monitor urine output    # Functional quadriplegia in setting of brainstem hemorrhage  - decub precautions  - care per nursing protocol     # CKD stage III   -Baseline Creatinine 1.1 ;Will continue to monitor     # Hyperphosphatemia   -Pt with elevated Phosphate 5  -Will continue to monitor for now if continues to uptrend then will consider starting sevelamer   -Will also discuss with nutrition about adjusting tube feeds     #DVT prop  -Heparin SQ TID    55 minutes spent on total encounter. The necessity of the time spent during the encounter on this date of service was due to:    Review of hospital course, labs, vitals, medical records.  Bedside exam and interview of the patient  Discussed plan of care with primary team   Documenting the encounter.

## 2025-02-15 NOTE — PROGRESS NOTE ADULT - SUBJECTIVE AND OBJECTIVE BOX
Patient was seen and examined at bedside. Case discuss with the primary team. Pt w/o any events overnight.     OBJECTIVE:  Vital Signs Last 24 Hrs  T(C): 36.9 (15 Feb 2025 08:53), Max: 36.9 (15 Feb 2025 05:05)  T(F): 98.4 (15 Feb 2025 08:53), Max: 98.5 (15 Feb 2025 05:05)  HR: 60 (15 Feb 2025 11:35) (60 - 86)  BP: 113/79 (15 Feb 2025 11:35) (103/69 - 143/99)  BP(mean): 92 (15 Feb 2025 11:35) (82 - 117)  RR: 16 (15 Feb 2025 11:35) (14 - 24)  SpO2: 98% (15 Feb 2025 11:35) (92% - 100%)    Parameters below as of 15 Feb 2025 11:35  Patient On (Oxygen Delivery Method): ventilator    O2 Concentration (%): 40      PHYSICAL EXAM:  Gen: Reclining in bed at time of exam  HEENT: trach in place   CV: RRR, +S1/S2  Pulm: adequate respiratory effort, no increase in work of breathing  Abd: soft, ND  Skin: warm and dry,   Neuro: awake, does not talk, opening eyes to voice         LABS:                        9.9    6.09  )-----------( 265      ( 15 Feb 2025 08:43 )             31.2     02-15    135  |  100  |  43[H]  ----------------------------<  87  3.9   |  25  |  1.19    Ca    9.2      15 Feb 2025 08:43  Phos  5.0     02-15  Mg     2.2     02-15    acetaminophen   Oral Liquid .. 650 milliGRAM(s) Oral every 6 hours PRN  acetylcysteine 10%  Inhalation 4 milliLiter(s) Inhalation every 6 hours  albuterol/ipratropium for Nebulization 3 milliLiter(s) Nebulizer every 6 hours  amLODIPine   Tablet 5 milliGRAM(s) Oral daily  artificial tears (preservative free) Ophthalmic Solution 1 Drop(s) Right EYE every 4 hours  baclofen 5 milliGRAM(s) Oral every 12 hours  chlorhexidine 0.12% Liquid 15 milliLiter(s) Oral Mucosa every 12 hours  chlorhexidine 2% Cloths 1 Application(s) Topical <User Schedule>  doxazosin 8 milliGRAM(s) Oral at bedtime  erythromycin   Ointment 1 Application(s) Both EYES four times a day  fluticasone propionate 50 MICROgram(s)/spray Nasal Spray 1 Spray(s) Both Nostrils two times a day  heparin   Injectable 5000 Unit(s) SubCutaneous every 8 hours  influenza   Vaccine 0.5 milliLiter(s) IntraMuscular once  levETIRAcetam 1500 milliGRAM(s) Oral every 12 hours  LORazepam   Injectable 2 milliGRAM(s) IV Push once PRN  pantoprazole   Suspension 40 milliGRAM(s) Oral daily  petrolatum Ophthalmic Ointment 1 Application(s) Both EYES four times a day  phenytoin   Suspension 150 milliGRAM(s) Oral <User Schedule>  phenytoin   Suspension 200 milliGRAM(s) Oral <User Schedule>  polyethylene glycol 3350 17 Gram(s) Oral daily  propranolol 5 milliGRAM(s) Oral every 12 hours  senna 2 Tablet(s) Oral at bedtime  sodium chloride 0.65% Nasal 1 Spray(s) Both Nostrils two times a day

## 2025-02-16 LAB
ANION GAP SERPL CALC-SCNC: 15 MMOL/L — SIGNIFICANT CHANGE UP (ref 5–17)
BASE EXCESS BLDA CALC-SCNC: 1.5 MMOL/L — SIGNIFICANT CHANGE UP (ref -2–3)
BUN SERPL-MCNC: 46 MG/DL — HIGH (ref 7–23)
CALCIUM SERPL-MCNC: 9.4 MG/DL — SIGNIFICANT CHANGE UP (ref 8.4–10.5)
CHLORIDE SERPL-SCNC: 99 MMOL/L — SIGNIFICANT CHANGE UP (ref 96–108)
CO2 BLDA-SCNC: 29 MMOL/L — HIGH (ref 19–24)
CO2 SERPL-SCNC: 24 MMOL/L — SIGNIFICANT CHANGE UP (ref 22–31)
CREAT SERPL-MCNC: 1.21 MG/DL — SIGNIFICANT CHANGE UP (ref 0.5–1.3)
EGFR: 75 ML/MIN/1.73M2 — SIGNIFICANT CHANGE UP
EGFR: 75 ML/MIN/1.73M2 — SIGNIFICANT CHANGE UP
GAS PNL BLDA: SIGNIFICANT CHANGE UP
GLUCOSE SERPL-MCNC: 98 MG/DL — SIGNIFICANT CHANGE UP (ref 70–99)
HCO3 BLDA-SCNC: 28 MMOL/L — SIGNIFICANT CHANGE UP (ref 21–28)
HCT VFR BLD CALC: 34.6 % — LOW (ref 39–50)
HGB BLD-MCNC: 11.1 G/DL — LOW (ref 13–17)
MAGNESIUM SERPL-MCNC: 2.2 MG/DL — SIGNIFICANT CHANGE UP (ref 1.6–2.6)
MCHC RBC-ENTMCNC: 31.2 PG — SIGNIFICANT CHANGE UP (ref 27–34)
MCHC RBC-ENTMCNC: 32.1 G/DL — SIGNIFICANT CHANGE UP (ref 32–36)
MCV RBC AUTO: 97.2 FL — SIGNIFICANT CHANGE UP (ref 80–100)
NRBC BLD AUTO-RTO: 0 /100 WBCS — SIGNIFICANT CHANGE UP (ref 0–0)
PCO2 BLDA: 49 MMHG — HIGH (ref 35–48)
PH BLDA: 7.36 — SIGNIFICANT CHANGE UP (ref 7.35–7.45)
PHOSPHATE SERPL-MCNC: 4.8 MG/DL — HIGH (ref 2.5–4.5)
PLATELET # BLD AUTO: 256 K/UL — SIGNIFICANT CHANGE UP (ref 150–400)
PO2 BLDA: 90 MMHG — SIGNIFICANT CHANGE UP (ref 83–108)
POTASSIUM SERPL-MCNC: 4 MMOL/L — SIGNIFICANT CHANGE UP (ref 3.5–5.3)
POTASSIUM SERPL-SCNC: 4 MMOL/L — SIGNIFICANT CHANGE UP (ref 3.5–5.3)
RBC # BLD: 3.56 M/UL — LOW (ref 4.2–5.8)
RBC # FLD: 12.6 % — SIGNIFICANT CHANGE UP (ref 10.3–14.5)
SAO2 % BLDA: 98.6 % — HIGH (ref 94–98)
SODIUM SERPL-SCNC: 138 MMOL/L — SIGNIFICANT CHANGE UP (ref 135–145)
WBC # BLD: 5.77 K/UL — SIGNIFICANT CHANGE UP (ref 3.8–10.5)
WBC # FLD AUTO: 5.77 K/UL — SIGNIFICANT CHANGE UP (ref 3.8–10.5)

## 2025-02-16 PROCEDURE — 99233 SBSQ HOSP IP/OBS HIGH 50: CPT

## 2025-02-16 PROCEDURE — 99233 SBSQ HOSP IP/OBS HIGH 50: CPT | Mod: GC

## 2025-02-16 PROCEDURE — 71045 X-RAY EXAM CHEST 1 VIEW: CPT | Mod: 26

## 2025-02-16 PROCEDURE — 76604 US EXAM CHEST: CPT | Mod: 26,GC

## 2025-02-16 RX ORDER — IPRATROPIUM BROMIDE AND ALBUTEROL SULFATE .5; 2.5 MG/3ML; MG/3ML
3 SOLUTION RESPIRATORY (INHALATION) EVERY 4 HOURS
Refills: 0 | Status: DISCONTINUED | OUTPATIENT
Start: 2025-02-16 | End: 2025-03-13

## 2025-02-16 RX ORDER — FUROSEMIDE 10 MG/ML
20 INJECTION INTRAMUSCULAR; INTRAVENOUS ONCE
Refills: 0 | Status: COMPLETED | OUTPATIENT
Start: 2025-02-16 | End: 2025-02-16

## 2025-02-16 RX ORDER — ACETYLCYSTEINE 200 MG/ML
4 INHALANT RESPIRATORY (INHALATION) EVERY 4 HOURS
Refills: 0 | Status: DISCONTINUED | OUTPATIENT
Start: 2025-02-16 | End: 2025-02-16

## 2025-02-16 RX ORDER — LORAZEPAM 4 MG/ML
2 VIAL (ML) INJECTION
Refills: 0 | Status: DISCONTINUED | OUTPATIENT
Start: 2025-02-16 | End: 2025-02-16

## 2025-02-16 RX ORDER — LORAZEPAM 4 MG/ML
4 VIAL (ML) INJECTION ONCE
Refills: 0 | Status: DISCONTINUED | OUTPATIENT
Start: 2025-02-16 | End: 2025-02-16

## 2025-02-16 RX ADMIN — IPRATROPIUM BROMIDE AND ALBUTEROL SULFATE 3 MILLILITER(S): .5; 2.5 SOLUTION RESPIRATORY (INHALATION) at 17:51

## 2025-02-16 RX ADMIN — Medication 150 MILLIGRAM(S): at 04:20

## 2025-02-16 RX ADMIN — IPRATROPIUM BROMIDE AND ALBUTEROL SULFATE 3 MILLILITER(S): .5; 2.5 SOLUTION RESPIRATORY (INHALATION) at 22:03

## 2025-02-16 RX ADMIN — ERYTHROMYCIN 1 APPLICATION(S): 5 OINTMENT OPHTHALMIC at 23:28

## 2025-02-16 RX ADMIN — Medication 2 TABLET(S): at 22:03

## 2025-02-16 RX ADMIN — ACETYLCYSTEINE 4 MILLILITER(S): 200 INHALANT RESPIRATORY (INHALATION) at 05:47

## 2025-02-16 RX ADMIN — Medication 1 SPRAY(S): at 17:50

## 2025-02-16 RX ADMIN — HEPARIN SODIUM 5000 UNIT(S): 1000 INJECTION INTRAVENOUS; SUBCUTANEOUS at 05:48

## 2025-02-16 RX ADMIN — DOXAZOSIN MESYLATE 8 MILLIGRAM(S): 8 TABLET ORAL at 22:03

## 2025-02-16 RX ADMIN — Medication 1 APPLICATION(S): at 12:10

## 2025-02-16 RX ADMIN — ERYTHROMYCIN 1 APPLICATION(S): 5 OINTMENT OPHTHALMIC at 00:08

## 2025-02-16 RX ADMIN — Medication 1 DROP(S): at 15:42

## 2025-02-16 RX ADMIN — Medication 2 MILLIGRAM(S): at 10:04

## 2025-02-16 RX ADMIN — Medication 1 DROP(S): at 17:51

## 2025-02-16 RX ADMIN — IPRATROPIUM BROMIDE AND ALBUTEROL SULFATE 3 MILLILITER(S): .5; 2.5 SOLUTION RESPIRATORY (INHALATION) at 11:33

## 2025-02-16 RX ADMIN — IPRATROPIUM BROMIDE AND ALBUTEROL SULFATE 3 MILLILITER(S): .5; 2.5 SOLUTION RESPIRATORY (INHALATION) at 14:37

## 2025-02-16 RX ADMIN — HEPARIN SODIUM 5000 UNIT(S): 1000 INJECTION INTRAVENOUS; SUBCUTANEOUS at 22:04

## 2025-02-16 RX ADMIN — Medication 150 MILLIGRAM(S): at 11:33

## 2025-02-16 RX ADMIN — BACLOFEN 5 MILLIGRAM(S): 10 INJECTION INTRATHECAL at 17:53

## 2025-02-16 RX ADMIN — FLUTICASONE PROPIONATE 1 SPRAY(S): 50 SPRAY, METERED NASAL at 17:50

## 2025-02-16 RX ADMIN — Medication 1 DROP(S): at 22:04

## 2025-02-16 RX ADMIN — Medication 1 DROP(S): at 05:46

## 2025-02-16 RX ADMIN — ERYTHROMYCIN 1 APPLICATION(S): 5 OINTMENT OPHTHALMIC at 11:34

## 2025-02-16 RX ADMIN — Medication 1 SPRAY(S): at 05:45

## 2025-02-16 RX ADMIN — ERYTHROMYCIN 1 APPLICATION(S): 5 OINTMENT OPHTHALMIC at 06:48

## 2025-02-16 RX ADMIN — FUROSEMIDE 20 MILLIGRAM(S): 10 INJECTION INTRAMUSCULAR; INTRAVENOUS at 06:54

## 2025-02-16 RX ADMIN — LEVETIRACETAM 1500 MILLIGRAM(S): 10 INJECTION, SOLUTION INTRAVENOUS at 17:52

## 2025-02-16 RX ADMIN — Medication 1 APPLICATION(S): at 18:15

## 2025-02-16 RX ADMIN — IPRATROPIUM BROMIDE AND ALBUTEROL SULFATE 3 MILLILITER(S): .5; 2.5 SOLUTION RESPIRATORY (INHALATION) at 07:24

## 2025-02-16 RX ADMIN — Medication 15 MILLILITER(S): at 05:49

## 2025-02-16 RX ADMIN — Medication 15 MILLILITER(S): at 17:51

## 2025-02-16 RX ADMIN — Medication 1 APPLICATION(S): at 05:31

## 2025-02-16 RX ADMIN — Medication 1 APPLICATION(S): at 00:08

## 2025-02-16 RX ADMIN — LEVETIRACETAM 1500 MILLIGRAM(S): 10 INJECTION, SOLUTION INTRAVENOUS at 05:47

## 2025-02-16 RX ADMIN — POLYETHYLENE GLYCOL 3350 17 GRAM(S): 17 POWDER, FOR SOLUTION ORAL at 11:33

## 2025-02-16 RX ADMIN — IPRATROPIUM BROMIDE AND ALBUTEROL SULFATE 3 MILLILITER(S): .5; 2.5 SOLUTION RESPIRATORY (INHALATION) at 05:48

## 2025-02-16 RX ADMIN — Medication 2 MILLIGRAM(S): at 10:05

## 2025-02-16 RX ADMIN — Medication 40 MILLIGRAM(S): at 11:33

## 2025-02-16 RX ADMIN — HEPARIN SODIUM 5000 UNIT(S): 1000 INJECTION INTRAVENOUS; SUBCUTANEOUS at 14:36

## 2025-02-16 RX ADMIN — Medication 1 DROP(S): at 09:57

## 2025-02-16 RX ADMIN — BACLOFEN 5 MILLIGRAM(S): 10 INJECTION INTRATHECAL at 05:46

## 2025-02-16 RX ADMIN — ERYTHROMYCIN 1 APPLICATION(S): 5 OINTMENT OPHTHALMIC at 17:52

## 2025-02-16 RX ADMIN — Medication 1 APPLICATION(S): at 06:48

## 2025-02-16 RX ADMIN — AMLODIPINE BESYLATE 5 MILLIGRAM(S): 10 TABLET ORAL at 05:46

## 2025-02-16 RX ADMIN — FLUTICASONE PROPIONATE 1 SPRAY(S): 50 SPRAY, METERED NASAL at 05:46

## 2025-02-16 RX ADMIN — Medication 1 DROP(S): at 03:54

## 2025-02-16 RX ADMIN — Medication 200 MILLIGRAM(S): at 19:35

## 2025-02-16 NOTE — PROGRESS NOTE ADULT - SUBJECTIVE AND OBJECTIVE BOX
HPI:  Unknown age male, unknown past medical history (reported stroke and MI by coworkers) presented to [facility] with AMS, Pt was working at [employer] when he was found down by coworkers. EMS called and pt brought to [facility] ED. Intubated, sedated, started on cardene for SBPs in 200s. CT head showed brain stem bleed. Transferred to NSICU for further management.  (30 Sep 2024 12:55)    INTERVAL EVENTS: GEORGE ovn, BAL from 2/13 during trach exchange growing moderate stenotrophomonas maltophilia, possible colonization since pt does not have elevated wbc or fevers    Hospital course:   9/30: transferred from [facility]. A line placed. Versed dc'd. Roomate Prasanth at bedside, states pt has family in [place], cannot confirm medications or PMH other than stroke and MI. 250cc bolus 3% given. LR switched to NS. hydralazine 25q8 started, 3% started, switched propofol to precedex   10/1: stability CTH done. Added labetalol, started TF. Palliative consulted. ethics consulted to determine surrogate. febrile 103, pan cx sent  10/2: BD 2, GEORGE overnight. TF resumed. Desatt'd to 80s, FiO2 inc. to 50. Fentanyl given, ABG, CXR ordered. Maxxed on precedex, started on propofol for DARIEN -4 - -5. Precedex dc'd. Duonebs, mucomyst, hypertonic added. 3% dc'd. Cardene dc'd. Start vanc/CTX. Increased labetalol 200q8. MRSA negative, dc'd vanc. ETT pulled back 2cm x 2, good positioning after confirmatory chest xray. Ethics attempting to establish HCP with family. Na 159, starting FW 250q6 for range 150-155.   10/3: BD3, GEORGE o/n, neuro stable. Na elevating, FW increased to 300q6. Dc'd bowel reg for diarrhea. vEEG started. SQH 5000q8 tonight.   10/4: BD 4, albumn bolus, incr. LR to 80 2/2 incr. in Cr, LR to 10 0cc/hr for uptrending Cr. Started 7% hypersal for 48hrs and SL atropine for inline/oral thick secretions. Dc'd CTX and started ancef for MSSA in the sputum. Nephrology consulted for CKD, f/u recs. SBP 170s, given hydralazine 10mg IVP.   10/5: BD5, o/n 10mg IVP hydralazine given for SBP 170s and started on hydralazine 25q8 via OGT. 10mg IV push labetalol for SBP > 160s. RT placed for diarrhea.   10/6: BD6, o/n FW increased to 350q4 per nephrology recs. IV tylenol for temp 100.6, SBp 160s presumed uncomfortable.   10/7: BD7, overnight pancultured for temp 101.8F.   10/8: BD8. GEORGE. Cr bumped. decreased LR to 75cc/hr. Adding simethicone ATC. incr hydralazine 50mgTID. Incr labetalol 300mgTID. Na 145, decreased FWF to 250q6. Start precedex. FENa consistent with intrinsic kidney injury. Pend repeat renal US. Retaining up to 1.3L, bladder scans q6, straight cath PRN  10/9: BD 9. GEORGE overnight. Neuro stable. abd xray for distention w non-specific gas pattern, OGT to LIWS for morning. duonebs/mucomyst to q8 for improving secretions. Changed tube feeds to Jevity 1.5 20cc/hr, low rate due to abdominal distention, nepro dense and more difficult to digest. Tolerating CPAP, confirmed by ABG.   10/10: BD 10. GEORGE overnight. Neuro stable. (+) gabriel for urinary retention on bladder scan. inc TF to goal rate of 40cc/hr. family leaning toward pursuing trach/PEG. 1/2 amp for FS 81.   10/11: BD 11. GEORGE overnight. Neuro stable. Trach/PEG consults placed.   10/12: BD 12. GEORGE overnight. Neuro stable. MRI brain complete.   10/13: BD 13. Increase flomax. Hold SQH after PM dose for trach tm. IVL.   10/14: BD 14. GEORGE overnight, remains on AC/VC. Gabriel placed for urinary retention. Dc'd free water.  S/p trach with pulm. NGT placed and CXR confirmed in good position.   10/15: BD 15, GEORGE ovn. resumed feeds. spiked 101, pan cx sent.   10/16: BD 16. GEORGE ovn. Lokelma 5mg for K+ 5.4. Started vanc q 24/zosyn for empiric PNA coverage, IVF to 100/hr. PEG held for fever.   10/17: BD 17,  ordered serum osm and urine osm for am. Started sinemet for neurostimulation. Increased cardura to 0.8. Started FW 100q4, dc'd IVF. MRSA negative, dc'd vanc. NGT replaced d/t coiling.   10/18: BD 18, GEORGE overnight, neuro stable. Amantadine added for neurostim. zosyn changed to unasyn for acinetobacter baumannii, failed TOV and required SC  10/19: BD 19, GEORGE ovn. cardura 2mg added for retention. labetalol decreased 200q8, hydralazine decreased 25q8. Gabriel replaced.   10/20: BD20, GEORGE overnight. NGT dislodged, replaced. PEG tomorrow w/ gen surg, FW increased to 150q4 and labetalol decreased to 100q8, lokelma given for hyperkalemia.   10/21: BD 21. POD0 PEG placement with Gen surg. decr labetolol to 50q8, incr. cardura to 0.4, started lokelma and phoslo, dc gabriel POD0 PEG placement with Gen surg.  10/22: BD 22. Plan to start TF today via PEG. dc labetalol, Following ophtho recs. Increased apnea settings - found to be in cheyne-moe respiration. CPAP 5/5.  10/23: BD 23. hydralazine d/c'd, trach collar trial today. Rectal tube placed at 6am.  10/24: BD24, o/n lokelma held due to diarrhea. Free water 100q6 resumed. dc'd tamsulosin, amantadine. Incr'd cardura to 8mg qhs. Dc'd FW. Switched jevity to nepro. gabriel placed for high urine output. Started SL atropine for oral secretions. Dc'd free water.  10/25: BD25, o/n decreased suctioning requirements to > q4hrs, GEORGE. Cr improving, cont phoslo, lokelma held at this time. Gabriel placed yest, cont. Tolerating trach collar. Given 500cc plasmalyte bolus for ANIKA. Dc'd sinemet.   10/26: BD26, o/n resumed lokelma 5mg daily and resumed 100cc free water q6hrs. Change in neuro status with new right pupillary dilation with anisocoria (right pupil 6mm fixed and left pupil 3mm briskly reactive). Given 23.4% NaCl bullet, taken for emergent CTH showing mostly resolved pontine hemorrhage, continued brainstem hypodensity likely edema d/t hemorrhage, no new hemorrhage or infarct, no herniation, mild increase in size of left lateral ventricle. Vitals remaine stable. Na goal > 140.   10/27: BD27, o/n GEORGE.Neuro stable. Pend stepdown with airway bed.   10/28: BD 28. GEORGE overnight. Neuro stable. Miralax ordered. Gabriel removed, pending TOV.  10/29: BD 29. GEORGE o/n. Given 2L NS over 8 hrs for increased BUN/Cr ratio. Gabriel placed for frequent straight cath.   10/30: BD 30.   10/31: BD 31. GEORGE overnight. Na 149, increased free water to 200q6. 1L NS for uptrending BUN.   11/1: BD 32. GEORGE overnight. Given 1L NS for dehydration. Na 146, increased FW to 250q6.   11/2: BD 33 GEORGE overnight, neuro stable, given 500cc bolus for net negative status and tachycardia   11/3: BD 34, GEORGE overnight, neuro stable. Patient remains tachycardic, EKG showing sinus tachycardia, given additional 500cc NS bolus. Febrile to 101.9F, pan cultured (without UA), CXR WNL, given tylenol.   11/4: BD 35, GEORGE overnight, neuro stable. Given 1L NS for tachycardia. sputum (+) for stenotropohomas maltophilia.   11/5: BD 36 GEORGE overnight, neuro stable. Vancomycin dc'd. Chest PT BID. ID consulted, cont zosyn.  11/6: BD 37. blood cx + klebsiella dc zosyn changed to cefepime, CTAP ordered, rpt blood cx sent.    11/7: BD 38. Pending CT A/P, given 250cc bolus and starting maintenance fluids overnight. Pending CT A/P after bolus   11/8: BD 39. CT CAP negative for infection.   11/9: BD 40. GEORGE overnight.  11/10: BD 41. GEORGE overnight. desat to 85 on trach collar, O2 inc to 10L and 100%, O2 sat inc to 95. pt tachy to 110s, euvolemic. given tylenol. ABG and CXR ordered. spiked fever, pancultured, RVP negative. AM ABG w pO2 79, rpt w pO2 79. pt appears comfortable, satting 94%.   11/11: BD 42. GEORGE overnight. pt became tachy to 130s, desat to 90 on 100% FiO2 and 10L. suctioned, (+) productive cough. temp 101.4, given 1g IV tylenol and 500cc NS bolus for euvolemia. fever and HR downtrending. LE dopplers negative for dvt  11/12: BD 43, GEORGE ovn, fever and HR downtrending, satting 97% 70% FIO2  11/13: BD 44, GEORGE ovn. started standing tylenol x24 hours for tachycardia. desat to 80s, o2 increased. CXR stable, pending CTA PE protocol.   11/14: BD 45, GEORGE overnight, neuro stable. resp therapy dec FiO2 to 70%.   11/15: BD 46, GEORGE overnight, neuro stable.  Rapid called for desaturation 30s, tachycardic 140s. Patient bagged, 100% fio2, heavily suctioned. CXR/POCUS unremarkable. ABG c/w desaturation. WBC 14.71. Afebrile. O2 improved to 90s and patient upgraded to ICU. ABG paO2 30s improved to 89 on vent. IV Tylenol x 1, sputum sent. Start protonix while o-n vent.   11/16: BD 47. POCUS showed collapsable IVCF, given 1L bolus. Vanco/Cefpime added empriic for PNA, NGT feeds restarted. MRSA swab neg, Vanc DC'd.   11/17: BD 48. GEORGE overnight. 1l bolus for tachycardia. Spiked to 101, cultured. 500cc bolus for tachycardia, tachy to 148 given 25mcg fentanyl, 250cc albumin, 1.5L bolus. 5 IV lopressor with response HR to 100s. +Stenotrophomonas on sputum cx.   11/18: BD 49. GEORGE overnight. Consulted ID, cefepime switched to bactrim x7days. Started hydrochlorothiazine 12.5mg daily.  11/19: BD 50. Tachy 120s, given tylenol and 500cc NS. Tolerating 5/5, switched to TCx. Holding phos binder. D/c Bactrim. D/c gabriel, f/u TOV. Dc'd PPI.   11/20: BD 51. GEORGE ovn. 1600 satting low 90s, mildly tachy to 110s, afebrile, RR wnl. O2 improved to mid 90s while inc O2 to 100% on TCx. CPAP 5/5 placed back on.  11/21: BD 52, GEORGE ovn, tolerating CPAP 5/5. Switch to trach collar during the day if tolerating well. HCTZ held for Cr bump, straight cath frequence increased to q4  11/22: BD 53, GEORGE ovn. Resumed phoslo. Gabriel placed. Resumed HCTZ.   11/23: BD 54. Holding tylenol in setting of possible fever, will require pan cx if febrile. Cr improved today. Cont CPAP. Bowel regimen held i/s/o diarrhea. FOBT negative.  11/24: BD 55. GEORGE overnight. Neuro stable. HCTZ dc'ed, started lisinopril 5. Lokelma dc'ed for K 3.7.   11/25: BD 56, GEORGE overnight. Neuro stable. dc'd lisinopril 5mg. Gabriel dc'd. TOV. 1545 noted to be hypotensive, MAP 50, in supine position on chair, HR 60s, afebrile, O2 96%. Given 1L cc NS bolus, placed back on bed in reverse trendelenberg, improved to map of 66. Neostick at bedside. Vitals check q1h. Dc'ed amlodipine. Failed TOV, bladder scan q6, sc prn. Added back Senna.   11/26: BD 57, GEORGE overnight. Neuro stable. Dc'd phoslo.   11/27: BD 58, GEORGE overnight. Neuro stable.    11/28: BD 59. Gabriel replaced.   11/29: GEORGE.  11/30: GEORGE, neuro stable.   12/1: BD 62, GEORGE overnight  12/2: BD 63, GEORGE overnight.; Given 1L bolus of LR for uptrending BUN/Cr.  12/3: BD 64. Reinstated eye gtt/moisture chamber given increased Rt eye injection  12/4: BD 65. GEORGE overnight. Attempted to speak with ophtho regarding eyelid weight/closure but no answer, full mailbox.   12/5: BD 66, GEORGE overnight, bowel regimen increased and had BM.   12/6: BD 67, GEORGE overnight, neuro stable.  12/7: GEORGE overnight, neuro stable. /110s, given x1 hydralazine 10 mg IVP. Restarted home amlodipine 5mg.  12/8: GEORGE. OOB to chair.     12/11: GEORGE, mucomyst added for thick secretions, simethicone for abd distension, abd xray with stool burden, increased bowel regimen.   12/12: GEORGE overnight.   12/13: GEORGE overnight.   12/14: GEORGE overnight  12/15: o/n Patient became tachycardic HR 120s and 10 minutes later O2 sat dropped as low as 89%. Patient suctioned without improvement in O2 sat and tube feeds found in suction catheter. TFs held.  STAT CXR ordered. STAT labs sent. Respiratory therapist called to bedside and patient trach connected to ventilator. After connection to ventilator and further suctioning O2 sat improved to 97% but patient HR remains 120-130s. Upgraded to NSICU for further management. Vancomycin and zosyn started. CTA  chest PE protocol and CTH ordered. Blood cultures sent.given 500cc bolus, rpt ABG sent pO2 243, CTH and CTA chest done. FS while NPO, FiO2 dec 50 pending ABG. sputum cx positive for few GPC and GNR.   12/16: GEORGE overnight, restarted amlodipine, troponin 75   12/17: GEORGE overnight, neuro stable. Attempted CPAP this morning, did not tolerate and back on full vent support. Dc'd Vanc. Tachycardia to 140s, noted extremities to be twitching along with jaw twitching. Given 2g of Keppra, total 4mg ativan and placed on EEG, full set of labs and lactate negative. Resumed trickle feeds at 20cc/hr. Given 250cc albumin for tachycardia.   12/18: GEORGE overnight. Neuro stable. CTH stable. EEG negative and dc'd.   12/19: GEORGE overnight. neuro stable. SIMV most of day, AC/VC at night.   12/20: GEORGE overnight, neuro stable. remains on VC/AC  12/21: GEORGE ovn. 1L bolus for tachy to 120s-130s.   12/22: GEORGE ovn. Tachy to 120s, given tylenol and IVF. 2mg IV ativan given for L jaw twitching. Sx resolved for 3 minutes and started again. Epilepsy contacted. Given 2mg IV ativan. Continued twitching. Increased keppra to 1500mg BID, 2mg ativan given. Propranolol started for refractory tachycardia. vEEG ordered. albumin given. POCUS performed, no b lines. Started on fosphenytoin, loaded w 20mg/kg then 100mg TID. febrile, pancx, started cefepime 2g q8, vancomycin  12/23: GEORGE ovn. +focal motor seizures on eeg. ID consulted. Vancomycin dc'ed as per ID.  12/24: GEORGE ovn, neuro stable. Baclofen 5 mg q12 started for hiccups. Na 129 from 134. Urine lytes c/w SIADH. Given 250cc 3% bolus. CT chest for infection w/u - f/u read. Repeat Na 136. UA negative  12/25: GEORGE ovn.   12/26: GEORGE ovn  12/27: GEORGE overnight. Blood cx neg @ 4 days, DC cefepime per medicine (sputum colonized).   12/28: Desaturation o/n to 80's, CXR obtained, pulse ox changed and sats resolved. Na 133 from 138, 250cc 3% bolus given. Cefepime resumed until 12/30 per ID. Rpt Na 138.  12/29: GEORGE overnight. Na stable.   12/30: GEORGE overnight. Family meeting with son, pt now DNR.   12/31: GEORGE overnight.   1/1: GEORGE overnight, neuro stable.  1/2: GEORGE overnight, neuro stable. FW decreased to 100q6.   1/3: GEORGE overnight, neuro stable. fosphenytoin IV changed to pheytoin PO via PEG per epilepsy recommendations  1/4: GEORGE overnight, neuro stable. FW incr. to 100q4  1/5: GEORGE overnight, neuro stable.   1/6: GEORGE overnight, neuro stable   1/7: GEORGE overnight, neuro stable. BUN/Cr increased, increased FW to 200q6. Given 1L bolus of LR over 2 hours. Gabriel d/c'd, voiding, continue bladder scan q6.   1/8: GEORGE overnight, neuro stable. BUN/Cr improving.  1/9: GEORGE overnight, neuro stable.   1/10: GEORGE overnight, neuro stable.   1/11: GEORGE overnight, neuro stable, vent settings stable.   1/12: GEORGE overnight, neuro stable. Febrile to 102F, f/u pan cx, chest xray, given tylenol. Zosyn started.   1/13: GEORGE overnight, neuro stable, cont Zosyn for presumed PNA, prelim sputum cx growing; few GS neg diplococci, mod GS neg rods, rare GS + rods, fever trend improved. Free water increased to [id number].   1/14: GEORGE overnight. pending renal US for elevated Cr  1/15: GEORGE ovn. renal US complete.   1/16: GEORGE overnight, neuro stable   1/17: GEORGE overnight, neuro stable   1/18: GEORGE overnight, neuro stable.   1/19: GEORGE overnight, neuro stable. Propranolol dec to 5q8.   1/20: GEORGE overnight, neuro stable. Weaned propranolol to 5mg BID.   1/21: GEORGE ovn, in AM having rapid vertical eye movements with facial twitching, 2g total keppra given for AM dose and phenytoin level ordered, vital signs stable. CTH stable. Phenytoin increased to 100/100/150mg per epilepsy  1/22: GEORGE ovn. IV hydral x 1 for SBP 170s.   1/23: Cont to have focal motor seizures. Per epilepsy, changed phenytoin dose to 100/100/200.   1/24: Phenytoin lvl tmrw AM. Chest Xray completed due to temp 100.2F  1/25: GEORGE overnight. Incr dilantin morning and afternoon per epilepsy.   1/26: GEORGE overnight. Sustained focal twitching noticed by nursing. Ativan 8mg in total given. Fosphenytoin 1500mg IV given. Placed on EEG. Epilepsy following. TFs held. Phenytoin increased to 150/150/200.   1/27: o/n CTH completed due to continued lethargy after ativan given yesterday afternoon. TFs resumed. 500cc bolus NS given for SBP 90s. EEG dc'ed, discussed w/ Dr. Tomlin. Febrile 101F, pan cx sent. Likely left sided infiltrate on CXR, c/f aspiration PNA. Started on vanc/zosyn. MRSA swab pending.   1/28: Neuro stable, on vanc/zosyn. +UTI on UA, MRSA negative. Vanc dc'd. RVP negative. Zosyn increased to pseudomonal dosing.   1/29: GEORGE overnight, neuro stable.  1/30: GEORGE overnight, neuro stable. Dc'd zosyn due to resistance, switched to Levaquin.  1/31: GEORGE ovenight, neuro stable.   2/1: Brief desat. Improved with neb and reposititioning. ABG and POCUS wnl.   2/4: GEORGE overnight  2/5: GEORGE overnight  2/6: GEORGE ovn  2/7: GEORGE ovn. L eye redness noted, started erythromycin and lacrilube to L eye as well. LR @ 100 cc/hr x 10 hours for uptrending BUN/Cr. Resumed bowel reg.   2/8: GEORGE overnight. Escalated bowel regimen.   2/9: GEORGE overnight.  2/10: GEORGE overnight. Social work to start working on SNF placement. Pending appointment with [employer] [place] [employer] to come to hospital for biometrics.  2/11: GEORGE overnight. Neuro stable. Potassium improved (downtrending).   2/12: GEORGE overnight. Neuro stable. Lokelma 5mg x 1. Decrease FW to 200q8. 1L NS bolus given for hydration, uptrending BUN/Cr. Wound care rec barrier wipes for penile wound.  TFs changed to inSilica renal formula per nutrition.  2/13: GEORGE overnight. Neuro stable. Corneal abrasion noted on exam, ophtho re-consulted for L eye, eye drops changed per recs to q4. Pulm contacted for trach exchange, attempted at bedside but patient unable to tolerate with minimal sedation, will plan for tomorrow.   2/14: GEORGE overnight, given 1L of NS for low BP after fentanyl with improvement. Neuro exam stable, pend trach exchange today with pulm. Pend optho assessment for left eye. Trache exchanged. Desaturation during placement to 90%. FiO2 incr'd 55%. CXR negative for pneumothorax.   2/15: GEORGE overnight. Dest'd 88%: suctione, incr'd FiO2, PEEP. CXR/ABG/POCUS done with B-lines, 20mg IV lasix given.   2/16: GEORGE ovn, BAL from 2/13 during trach exchange growing moderate stenotrophomonas maltophilia    Vital Signs Last 24 Hrs  T(C): 37.1 (15 Feb 2025 21:54), Max: 37.1 (15 Feb 2025 21:54)  T(F): 98.7 (15 Feb 2025 21:54), Max: 98.7 (15 Feb 2025 21:54)  HR: 80 (15 Feb 2025 23:28) (60 - 80)  BP: 105/70 (15 Feb 2025 23:28) (105/70 - 115/82)  BP(mean): 83 (15 Feb 2025 23:28) (83 - 95)  RR: 14 (15 Feb 2025 23:28) (14 - 18)  SpO2: 87% (15 Feb 2025 23:28) (87% - 100%)    Parameters below as of 15 Feb 2025 23:28  Patient On (Oxygen Delivery Method): ventilator    O2 Concentration (%): 40    I&O's Summary    14 Feb 2025 07:01  -  15 Feb 2025 07:00  --------------------------------------------------------  IN: 2161 mL / OUT: 2000 mL / NET: 161 mL    15 Feb 2025 07:01  -  15 Feb 2025 23:31  --------------------------------------------------------  IN: 672 mL / OUT: 900 mL / NET: -228 mL        PHYSICAL EXAM:  Constitutional: NAD, lying in bed.  HEENT: Pupils equal, round, reactive to light.   Respiratory: +Trach to mechanical vent. No respiratory distress, symmetric chest rise  Cardiovascular: Regular rate and rhythm    Gastrointestinal: +PEG, Abdomen soft, nondistended.  Neurological:  AAOX3 to squeezing of R hand. Opens eyes. Tracks vertically  Motor: RUE squeezes hand to command HG 2/5, LUE 0/5, B/l LE w/d.   Sensation: unable to assess  Extremities: Warm, well perfused.    TUBES/LINES:  [] Gabriel  [] A-line  [] Lumbar Drain  [] Wound Drains  [] NGT   [] EVD   [] CVC  [] Other      DIET:  [] NPO  [x] Mechanical  [] Tube feeds    LABS:                        9.9    6.09  )-----------( 265      ( 15 Feb 2025 08:43 )             31.2     02-15    135  |  100  |  43[H]  ----------------------------<  87  3.9   |  25  |  1.19    Ca    9.2      15 Feb 2025 08:43  Phos  5.0     02-15  Mg     2.2     02-15        Urinalysis Basic - ( 15 Feb 2025 08:43 )    Color: x / Appearance: x / SG: x / pH: x  Gluc: 87 mg/dL / Ketone: x  / Bili: x / Urobili: x   Blood: x / Protein: x / Nitrite: x   Leuk Esterase: x / RBC: x / WBC x   Sq Epi: x / Non Sq Epi: x / Bacteria: x          CAPILLARY BLOOD GLUCOSE      POCT Blood Glucose.: 130 mg/dL (15 Feb 2025 16:14)      Drug Levels: [] N/A    CSF Analysis: [] N/A      Allergies    Allergy Status Unknown    Intolerances        Home Medications:      MEDICATIONS:  MEDICATIONS  (STANDING):  acetylcysteine 10%  Inhalation 4 milliLiter(s) Inhalation every 6 hours  albuterol/ipratropium for Nebulization 3 milliLiter(s) Nebulizer every 6 hours  amLODIPine   Tablet 5 milliGRAM(s) Oral daily  artificial tears (preservative free) Ophthalmic Solution 1 Drop(s) Right EYE every 4 hours  baclofen 5 milliGRAM(s) Oral every 12 hours  chlorhexidine 0.12% Liquid 15 milliLiter(s) Oral Mucosa every 12 hours  chlorhexidine 2% Cloths 1 Application(s) Topical <User Schedule>  doxazosin 8 milliGRAM(s) Oral at bedtime  erythromycin   Ointment 1 Application(s) Both EYES four times a day  fluticasone propionate 50 MICROgram(s)/spray Nasal Spray 1 Spray(s) Both Nostrils two times a day  heparin   Injectable 5000 Unit(s) SubCutaneous every 8 hours  influenza   Vaccine 0.5 milliLiter(s) IntraMuscular once  levETIRAcetam 1500 milliGRAM(s) Oral every 12 hours  pantoprazole   Suspension 40 milliGRAM(s) Oral daily  petrolatum Ophthalmic Ointment 1 Application(s) Both EYES four times a day  phenytoin   Suspension 150 milliGRAM(s) Oral <User Schedule>  phenytoin   Suspension 200 milliGRAM(s) Oral <User Schedule>  polyethylene glycol 3350 17 Gram(s) Oral daily  propranolol 5 milliGRAM(s) Oral every 12 hours  senna 2 Tablet(s) Oral at bedtime  sodium chloride 0.65% Nasal 1 Spray(s) Both Nostrils two times a day    MEDICATIONS  (PRN):  acetaminophen   Oral Liquid .. 650 milliGRAM(s) Oral every 6 hours PRN Temp greater or equal to 38C (100.4F), Mild Pain (1 - 3)  LORazepam   Injectable 2 milliGRAM(s) IV Push once PRN Seizure Activity (NOTIFY PROVIDER PRIOR TO GIVING)      CULTURES:  Culture Results:   Moderate Stenotrophomonas maltophilia  Commensal iram consistent with body site (02-13 @ 20:26)      RADIOLOGY & ADDITIONAL TESTS:      ASSESSMENT:  44M PMH ?stroke/MI present to [facility] after collapsing at work. Decorticate posturing, vomiting, intubated for airway protection. 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 Transferred to [facility] for further management. s/p trach 10/14. s/p peg 10/21. Re-upgrade to ICU 2/2 desaturation event and suctioning requirements 11/15. Re-upgrade to NSICU 12/15 2/2 desaturation and tachycardia.    PLAN:  Neuro:  - neuro/vitals q4h  - pain control: tylenol prn  - seizure tx: keppra 1500mg BID, phenytoin 150/150/200  - s/p vEEG (10/3-4), (10/17-10/19), (12/17-12/18), (12/22-12/25), (1/26-1/28),  +focal motor seizures not correlating w/ EEG  - CTH 9/30: enlarged pontine hemorrhage, CTH 10/3: stable, CTH 10/25: mostly resolved pontine hemorrhage, CTH 12/15: R mastoid air cell opacification; acute otitis media vs sterile effusion, CTH 12/18: stable. CTH 1/21 stable, CTH 1/27 stable  - MRI brain 10/12: parenchymal hemorrhage, acute/subacute R cerebellar stroke      CV:  - -160  - tachycardia: propranolol 5mg BID   - HTN: amlodipine 5mg  - echo (9/30) EF 75%, repeat 12/16: 57%    PULM:  - pend trach exchange with pulm at bedside 2/14 to vent, AC/VC 40/400/14/6  - Secretions: duonebs/mucomyst/chest PT q6h     GI:  - TFs via PEG, [facility]  - bowel regimen, last BM 2/13  - baclofen 5q12 for hiccups    Renal:  - IVL  - FW 200q8 for hydration  - Voiding, SC prn  - CKD: trend BUN/Cr  - renal US 10/1, 10/8, 1/15: Increased bilateral renal echogenicity consistent with medical renal disease    Endo:  - A1c 5.4    Heme:  - DVT ppx: SCDs, SQH 5000u q8h   - LE dopplers negative 12/16    ID:  - last pan cx 1/27  - +klebsiella UTI s/p levaquin (1/30-2/3)  - empiric PNA: cefepime (12/22-12/30), s/p vanc (12/22-12/23), s/p zosyn (12/15-12/20), s/p vanc (12/15-12/16), s/p zosyn (1/12 -1/18)  - BAL 2/13 +moderate stenotrophomonas maltophilia; possible colonization  - s/p Stenotrophomonas maltophilia PNA: s/p Bactrim (11/18-11/19) s/p Cefepime (11/16-11/18)  - 11/3, (+) sputum for stenotrophomas maltophlia, blood cx (+) klebsiella, cefepime 2gq12 (11/6 - 11/12)   - S/p Ancef (10/4-10/14) for PNA, and s/p Unasyn (10/18-10/23) +actinobacter baumanii     MISC:  - BL keratitis: BL erythromycin ointment, artificial tears, lacrilube q4, moisture chamber   - Penile wound: wound care rec barrier wipes     Dispo: SDU status, DNR, pending SNF    D/w Dr. D'Amico     Assessment: present when checked     [] GCS   E   V   M     Heart Failure: [] Acute, [] acute on chronic, [] chronic   Heart Failure: [] Diastolic (HFpEF), [] Systolic (HRrEF), [] Combined (HFpEF and HFrEF), [] RHF, [] Pulm HTN, [] Other     [] ANIKA, [] ATN, [] AIN, [] other   [] CKD1, [] CKD2, [] CKD3, [] CKD4, [] CKD5, [] ESRD     Encephalopathy: [] Metabolic, [] Hepatic, [] Toxic, [] Neurological, [] Other     Abnormal Nutritional Status: [] malnutrition (see nutrition note), []underweight: BMI <19, [] morbid obesity: BMI >40, [] Cachexia     [] Sepsis   [] Hypovolemic shock, [] Cardiogenic shock, [] Hemorrhagic shock, [] Neurogenic shock   [] Acute respiratory failure   [] Cerebral edema, [] Brain compression / herniation   [] Functional quadriplegia   [] Acute blood loss anemia

## 2025-02-16 NOTE — PROGRESS NOTE ADULT - SUBJECTIVE AND OBJECTIVE BOX
INTERVAL HPI/OVERNIGHT EVENTS:  Recalled to see patient for concern for pneumonia.  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  CT chest on 12/24 showed extensive patchy bilateral lower lobe opacities.  Sputum culture grew Klebsiella aerogenes and Stenotrophomonas maltophilia.  He defervesced with treatment for Klebsiella, Stenotrophomonas not covered, and completed a one week course of cefepime.  He was treated with pip-tazo from 1/12-1/30 for fever, presumed pneumonia.  CXR showed R basilar atelectasis, sputum culture grew mixed GNRs and NRF.  He was treated with levofloxacin from 1/30-2/3 for UTI, urine culture grew >10^5 Klebsiella aerogenes. On 2/13, he underwent bronch in prep for trach exchange that he subsequently did not tolerate. Culture from bronch grew Stenotrophomonas.  On 2/14, he underwent trach exchange (per ’s directions), had desaturation during placement to 90%.  CXR showed no pneumothorax.  Since that time, he has had episodes of desaturation with resolution with suctioning per primary team.  No fever or leukocytosis.  Suctioning a lot of secretions but relatively clear.  CXR on 2/16 showed discoid changes R lung base, discoid change and/or infiltrate L lung base with small L pleural effusion.      ANTIBIOTICS/RELEVANT:    Amp-sulbactam 10/18-10/23  Cefazolin 10/4-10/14  Cefepime 11/6-11/12; 11/16-11/18;  12/22-30  Ceftriaxone 10/2-10/4  Pip-tazo 10/16-10/18, 11/3-11/6, 12/15-12/17, 1/12-1/28  TMP/SX 11/18-11/19  Vancomycin 10/16, 11/3-11/5, 12/22-12/23      Vital Signs Last 24 Hrs  T(C): 36.8 (16 Feb 2025 17:54), Max: 37.9 (16 Feb 2025 15:46)  T(F): 98.2 (16 Feb 2025 17:54), Max: 100.3 (16 Feb 2025 15:46)  HR: 82 (16 Feb 2025 20:21) (72 - 100)  BP: 100/64 (16 Feb 2025 20:21) (100/64 - 122/82)  BP(mean): 79 (16 Feb 2025 20:21) (79 - 98)  RR: 16 (16 Feb 2025 20:21) (14 - 18)  SpO2: 95% (16 Feb 2025 20:21) (75% - 98%)    Parameters below as of 16 Feb 2025 20:21  Patient On (Oxygen Delivery Method): ventilator    O2 Concentration (%): 40    PHYSICAL EXAM:  Constitutional:  Sleeping  HEENT:  NC, ointment in eyes, PERRL.  No nasal exudate or sinus tenderness;  Unable to visualize pharynx	  Neck:  +trach  Respiratory:  Clear bilaterally anteriorly  Cardiovascular:  RRR, S1S2, no murmur appreciated  Gastrointestinal:  +PEG, normoactive BS, soft, NT, no masses, guarding or rebound.  No HSM  Extremities:  No edema      LABS:                        11.1   5.77  )-----------( 256      ( 16 Feb 2025 07:26 )             34.6         02-16    138  |  99  |  46[H]  ----------------------------<  98  4.0   |  24  |  1.21    Ca    9.4      16 Feb 2025 07:26  Phos  4.8     02-16  Mg     2.2     02-16        Urinalysis Basic - ( 16 Feb 2025 07:26 )    Color: x / Appearance: x / SG: x / pH: x  Gluc: 98 mg/dL / Ketone: x  / Bili: x / Urobili: x   Blood: x / Protein: x / Nitrite: x   Leuk Esterase: x / RBC: x / WBC x   Sq Epi: x / Non Sq Epi: x / Bacteria: x        MICROBIOLOGY:        RADIOLOGY & ADDITIONAL STUDIES:

## 2025-02-16 NOTE — PROGRESS NOTE ADULT - ASSESSMENT
Episodes of desaturation following trach exchange on 2/14, sputum culture from bronch on 2/13 grew Stenotrophomonas, with which he has been colonized for months.  CXR on 2/16 shows possible LLL infiltrate - if present, may represent lag.  He remains afebrile, no leukocytosis.  Would not want to treat for Stenotrophomonas pneumonia unless clearly needed as two drugs with significant toxicities are required.  Suggest:  - Please repeat tracheal aspirate for culture  - CT chest (noncon)  - If he is to start antibiotics, please send blood cultures X 2 sets before started  Recommendations discussed with primary team. Dr Lee will cover on 2/17, I will resume care 2/18, team 2.

## 2025-02-16 NOTE — PROGRESS NOTE ADULT - SUBJECTIVE AND OBJECTIVE BOX
PULMONARY CONSULT SERVICE FOLLOW-UP NOTE    INTERVAL HPI:  Reviewed chart and overnight events; patient seen and examined at bedside. Called back for intermittent hypoxemia. Chart reviewed. Unable to obtain ROS from patient 2/2 mental status.     MEDICATIONS:  Pulmonary:  albuterol/ipratropium for Nebulization 3 milliLiter(s) Nebulizer every 4 hours    Antimicrobials:    Anticoagulants:  heparin   Injectable 5000 Unit(s) SubCutaneous every 8 hours    Cardiac:  amLODIPine   Tablet 5 milliGRAM(s) Oral daily  doxazosin 8 milliGRAM(s) Oral at bedtime  propranolol 5 milliGRAM(s) Oral every 12 hours      Allergies    Allergy Status Unknown    Intolerances        Vital Signs Last 24 Hrs  T(C): 36.8 (16 Feb 2025 17:54), Max: 37.9 (16 Feb 2025 15:46)  T(F): 98.2 (16 Feb 2025 17:54), Max: 100.3 (16 Feb 2025 15:46)  HR: 76 (16 Feb 2025 16:32) (68 - 100)  BP: 105/71 (16 Feb 2025 15:46) (105/70 - 122/82)  BP(mean): 85 (16 Feb 2025 15:46) (83 - 98)  RR: 14 (16 Feb 2025 16:32) (14 - 18)  SpO2: 95% (16 Feb 2025 16:32) (75% - 98%)    Parameters below as of 16 Feb 2025 16:32  Patient On (Oxygen Delivery Method): ventilator    O2 Concentration (%): 40    02-15 @ 07:01  -  02-16 @ 07:00  --------------------------------------------------------  IN: 1207 mL / OUT: 1200 mL / NET: 7 mL    02-16 @ 07:01  -  02-16 @ 19:07  --------------------------------------------------------  IN: 0 mL / OUT: 600 mL / NET: -600 mL      Mode: AC/ CMV (Assist Control/ Continuous Mandatory Ventilation)  RR (machine): 14  TV (machine): 400  FiO2: 40  PEEP: 6  ITime: 1  MAP: 7.8  PIP: 17      PHYSICAL EXAM:  Constitutional: chronically ill appearing   HEENT: NC/AT; PERRL, anicteric sclera; MMM  Neck: supple  Cardiovascular: +S1/S2, RRR  Respiratory: rhonchi at left  base  Gastrointestinal: soft, NT/ND  Extremities: WWP; no edema, clubbing or cyanosis  Vascular: 2+ radial pulses B/L  Neurological: does not follow commands     LABS:  ABG - ( 16 Feb 2025 06:49 )  pH, Arterial: 7.36  pH, Blood: x     /  pCO2: 49    /  pO2: 90    / HCO3: 28    / Base Excess: 1.5   /  SaO2: 98.6                CBC Full  -  ( 16 Feb 2025 07:26 )  WBC Count : 5.77 K/uL  RBC Count : 3.56 M/uL  Hemoglobin : 11.1 g/dL  Hematocrit : 34.6 %  Platelet Count - Automated : 256 K/uL  Mean Cell Volume : 97.2 fl  Mean Cell Hemoglobin : 31.2 pg  Mean Cell Hemoglobin Concentration : 32.1 g/dL  Auto Neutrophil # : x  Auto Lymphocyte # : x  Auto Monocyte # : x  Auto Eosinophil # : x  Auto Basophil # : x  Auto Neutrophil % : x  Auto Lymphocyte % : x  Auto Monocyte % : x  Auto Eosinophil % : x  Auto Basophil % : x    02-16    138  |  99  |  46[H]  ----------------------------<  98  4.0   |  24  |  1.21    Ca    9.4      16 Feb 2025 07:26  Phos  4.8     02-16  Mg     2.2     02-16            Urinalysis Basic - ( 16 Feb 2025 07:26 )    Color: x / Appearance: x / SG: x / pH: x  Gluc: 98 mg/dL / Ketone: x  / Bili: x / Urobili: x   Blood: x / Protein: x / Nitrite: x   Leuk Esterase: x / RBC: x / WBC x   Sq Epi: x / Non Sq Epi: x / Bacteria: x                RADIOLOGY & ADDITIONAL STUDIES:

## 2025-02-16 NOTE — PROGRESS NOTE ADULT - ASSESSMENT
44M with unclear PMH (?stroke, MI) who was found down at work, intubated for airway protection and found to have acute parenchymal hemorrhage within nabil with mass effect (+ acute/subacute right cerebellar infarct) in setting of hypertensive emergency, transferred to [facility] for further neurosurgical care. Hospital course c/b poor neurologic recovery s/p trach-PEG, AUR s/p gabriel, ANIKA on CKD c/b hyperkalemia, HAP s/p amp-sulbactam (EOT 10/23), K aerogenes bacteremia  treated with 2 weeks of Cefepime per ID , On 11/15 he became septic and was transferred to NSICU due to increased o2 requirements and needed Vent support , Trach Cultures + for Stenotrophomonas which was treated with 7 days of Bactrim per ID , has been weaned of Vent since 11/23 and is now on 10lts at 40% o2 through Trach Collar. Stepped up to the NSICU on 12/15 for hypoxia and tachycardia. Pt s/p  abx for 5 days. Pt now stepped down to 8Lach.    # Pontine hemorrhage  # Encephalopathy due to Intracranial Bleed   - Acute parenchymal hemorrhage within nabil with mass effect (+ acute/subacute right cerebellar infarct) in setting of hypertensive emergency.   - MRI brain also demonstrated acute/subacute R cerebellar stroke.   - No Neurosurgical Interventions were performed   - Secondary to IPH and cerebellar stroke - Trach and PEG Dependent    # Seizures  -Pt with episode of facial and leg twitching this morning which resolved after the pt was given ativan 4mg x1.   - Continue Seizure precautions   - Continue Keppra and phenytoin; Will speak to epilepsy about possible adjustment in the pt's antiepileptic medication given episode of facial and leg twitching.     # Hypertension  - Continue amlodipine 5mg daily with holding parameters  -Continue Propranolol with holding parameters     #Acute on Chronic respiratory failure likely 2/2 Pneumonia    - s/p percutaneous trach by pulm on 10/14/24  - Continue vent support and chest PT  - Pt s/p trach Exchange by Pulmonary on 2/14.   -Pt was seen by Pulmonary 2/2 episode of desaturation and pt to be started on vancomycin and Zosyn for pneumonia  -Will f/u RVP and COVID   -Pt's BAL on 2/13 growing stenotrophomonas     # Neurogenic dysphagia.   - Pt s/p PEG placement by  surgery 10/21/24  - Continue TF per nutrition  -Continue aspiration precautions and elevation of HOB.    # Acute urinary retention.   - doxazosin 8mg qHS   - monitor urine output    # Functional quadriplegia in setting of brainstem hemorrhage  - decub precautions  - care per nursing protocol     # CKD stage III   -Baseline Creatinine 1.1  -Will continue to monitorcreatinine and avoid nephrotoxic agenets    # Hyperphosphatemia   -Pt's  phosphate downtrending  5 ->4.8   -Will continue to monitor for now if continues to uptrend then will consider starting sevelamer   -Will also discuss with nutrition about adjusting tube feeds     #DVT prop  -Heparin SQ TID    55 minutes spent on total encounter. The necessity of the time spent during the encounter on this date of service was due to:    Review of hospital course, labs, vitals, medical records.  Bedside exam and interview of the patient  Discussed plan of care with primary team   Documenting the encounter.     44M with unclear PMH (?stroke, MI) who was found down at work, intubated for airway protection and found to have acute parenchymal hemorrhage within nabil with mass effect (+ acute/subacute right cerebellar infarct) in setting of hypertensive emergency, transferred to [facility] for further neurosurgical care. Hospital course c/b poor neurologic recovery s/p trach-PEG, AUR s/p gabriel, ANIKA on CKD c/b hyperkalemia, HAP s/p amp-sulbactam (EOT 10/23), K aerogenes bacteremia  treated with 2 weeks of Cefepime per ID , On 11/15 he became septic and was transferred to NSICU due to increased o2 requirements and needed Vent support , Trach Cultures + for Stenotrophomonas which was treated with 7 days of Bactrim per ID , has been weaned of Vent since 11/23 and is now on 10lts at 40% o2 through Trach Collar. Stepped up to the NSICU on 12/15 for hypoxia and tachycardia. Pt s/p  abx for 5 days. Pt now stepped down to 8Lach.    # Pontine hemorrhage  # Encephalopathy due to Intracranial Bleed   - Acute parenchymal hemorrhage within nabil with mass effect (+ acute/subacute right cerebellar infarct) in setting of hypertensive emergency.   - MRI brain also demonstrated acute/subacute R cerebellar stroke.   - No Neurosurgical Interventions were performed   - Secondary to IPH and cerebellar stroke - Trach and PEG Dependent    # Seizures  -Pt with episode of facial and leg twitching this morning which resolved after the pt was given ativan 4mg x1.   - Continue Seizure precautions   - Continue Keppra and phenytoin; Will speak to epilepsy about possible adjustment in the pt's antiepileptic medication given episode of facial and leg twitching.     # Hypertension  - Continue amlodipine 5mg daily with holding parameters  -Continue Propranolol with holding parameters     #Acute on Chronic respiratory failure likely 2/2 Pneumonia    - s/p percutaneous trach by pulm on 10/14/24  - Pt s/p trach Exchange by Pulmonary on 2/14.   -Pt was seen by Pulmonary 2/2 episode of desaturation and pt to be started on vancomycin and Zosyn for pneumonia  -Will f/u RVP and COVID   -Pt's BAL on 2/13 growing stenotrophomonas   - Continue vent support and chest PT    # Neurogenic dysphagia.   - Pt s/p PEG placement by  surgery 10/21/24  - Continue TF per nutrition  -Continue aspiration precautions and elevation of HOB.    # Acute urinary retention.   - doxazosin 8mg qHS   - monitor urine output    # Functional quadriplegia in setting of brainstem hemorrhage  - decub precautions  - care per nursing protocol     # CKD stage III   -Baseline Creatinine 1.1  -Will continue to monitorcreatinine and avoid nephrotoxic agenets    # Hyperphosphatemia   -Pt's  phosphate downtrending  5 ->4.8   -Will continue to monitor for now if continues to uptrend then will consider starting sevelamer   -Will also discuss with nutrition about adjusting tube feeds     #DVT prop  -Heparin SQ TID    55 minutes spent on total encounter. The necessity of the time spent during the encounter on this date of service was due to:    Review of hospital course, labs, vitals, medical records.  Bedside exam and interview of the patient  Discussed plan of care with primary team   Documenting the encounter.     44M with unclear PMH (?stroke, MI) who was found down at work, intubated for airway protection and found to have acute parenchymal hemorrhage within nabil with mass effect (+ acute/subacute right cerebellar infarct) in setting of hypertensive emergency, transferred to [facility] for further neurosurgical care. Hospital course c/b poor neurologic recovery s/p trach-PEG, AUR s/p gabriel, ANIKA on CKD c/b hyperkalemia, HAP s/p amp-sulbactam (EOT 10/23), K aerogenes bacteremia  treated with 2 weeks of Cefepime per ID , On 11/15 he became septic and was transferred to NSICU due to increased o2 requirements and needed Vent support , Trach Cultures + for Stenotrophomonas which was treated with 7 days of Bactrim per ID , has been weaned of Vent since 11/23 and is now on 10lts at 40% o2 through Trach Collar. Stepped up to the NSICU on 12/15 for hypoxia and tachycardia. Pt s/p  abx for 5 days. Pt now stepped down to 8Lach.    # Pontine hemorrhage  # Encephalopathy due to Intracranial Bleed   - Acute parenchymal hemorrhage within nabil with mass effect (+ acute/subacute right cerebellar infarct) in setting of hypertensive emergency.   - MRI brain also demonstrated acute/subacute R cerebellar stroke.   - No Neurosurgical Interventions were performed   - Secondary to IPH and cerebellar stroke - Trach and PEG Dependent    # Seizures  -Pt with episode of facial and leg twitching this morning which resolved after the pt was given ativan 4mg x1.   - Continue Seizure precautions   - Continue Keppra and phenytoin; Will speak to epilepsy about possible adjustment in the pt's antiepileptic medication given episode of facial and leg twitching.     # Hypertension  - Continue amlodipine 5mg daily with holding parameters  -Continue Propranolol with holding parameters     #Acute on Chronic respiratory failure likely 2/2 Pneumonia    - s/p percutaneous trach by pulm on 10/14/24  - Pt s/p trach Exchange by Pulmonary on 2/14.   -Pt was seen by Pulmonary 2/2 episode of desaturation; awaiting Pulm recommendation  -Pt seen by ID who recommended a CT chest w/o contrast to further evaluate   -Will f/u RVP and COVID   -Pt's BAL on 2/13 growing stenotrophomonas   - Continue vent support and chest PT    # Neurogenic dysphagia.   - Pt s/p PEG placement by  surgery 10/21/24  - Continue TF per nutrition  -Continue aspiration precautions and elevation of HOB.    # Acute urinary retention.   - doxazosin 8mg qHS   - monitor urine output    # Functional quadriplegia in setting of brainstem hemorrhage  - decub precautions  - care per nursing protocol     # CKD stage III   -Baseline Creatinine 1.1  -Will continue to monitorcreatinine and avoid nephrotoxic agenets    # Hyperphosphatemia   -Pt's  phosphate downtrending  5 ->4.8   -Will continue to monitor for now if continues to uptrend then will consider starting sevelamer   -Will also discuss with nutrition about adjusting tube feeds     #DVT prop  -Heparin SQ TID    55 minutes spent on total encounter. The necessity of the time spent during the encounter on this date of service was due to:    Review of hospital course, labs, vitals, medical records.  Bedside exam and interview of the patient  Discussed plan of care with primary team   Documenting the encounter.

## 2025-02-16 NOTE — PROGRESS NOTE ADULT - TIME BILLING
preparing to see patient, performing physical exam, communicating with other health care providers, documenting in the EMR, independently interpreting results.

## 2025-02-16 NOTE — PROGRESS NOTE ADULT - ASSESSMENT
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    Acute hypoxic respiratory failure  Brain stem stroke  Acute encephalopathy    Continues to be ventilator dependent. He still has considerable secretions, not able to clear on his own, and is being frequently suctioned.   His tracheostomy tube is due to be exchanged, per  directions.   S/p bronchoscopy with tracheostomy exchange, size 7.0 portex cuffed exchanged for size 7.0 portex cuffed    Called back for intermittent hypoxemia. he continues to have mucopurulent secretions with bronch cultures growing stenotrophomonas. He has a dense LLL consolidation on US as well as new LLL opacification on CXR. Clinically and radiographically he has pneumonia and should be treated as much with abx therapy targeting his steno.    - Not a candidate for downsizing or decannulation at this time as patient still requires ventilator support  - Wean FiO2 as tolerated  - Frequent suctioning and airway clearance; avoid mucomyst as this can be very bronchospastic   - abx to target steno; sensitive to bactrim, levaquin and minocycline     Patient was seen, evaluated and discussed with PCCM attending.  Please page pulmonary if there are any questions with above recommendations.    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    Acute hypoxic respiratory failure  Brain stem stroke  Acute encephalopathy    Continues to be ventilator dependent. He still has considerable secretions, not able to clear on his own, and is being frequently suctioned.   S/p bronchoscopy with tracheostomy exchange, size 7.0 portex cuffed exchanged for size 7.0 portex cuffed    Called back for intermittent hypoxemia. he continues to have mucopurulent secretions with bronch cultures growing stenotrophomonas. He has a dense LLL consolidation on US as well as new LLL opacification on CXR. Clinically and radiographically he has pneumonia and should be treated as much with abx therapy targeting his steno.    - Not a candidate for downsizing or decannulation at this time as patient still requires ventilator support  - Wean FiO2 as tolerated  - Frequent suctioning and airway clearance; avoid mucomyst as this can be very bronchospastic   - abx to target steno; sensitive to bactrim, levaquin and minocycline     Patient was seen, evaluated and discussed with PCCM attending.  Please page pulmonary if there are any questions with above recommendations.

## 2025-02-16 NOTE — PROGRESS NOTE ADULT - SUBJECTIVE AND OBJECTIVE BOX
Patient was seen and examined at bedside. Case discuss with the primary team. Pt had two episodes of desaturation. Pt ahd one episode that occurred early in the morning in which the pt desaturated to the 70% and the pt's saturation improved with suctioning and adjustment of his ventilator setting. Pt had a CXR which showed left sided infiltrate. Pt also had another episode of desaturation to the 80's. Pt saturation improved with suctioning.     Pt also with an episode of facial and leg twitching  which resolved after the pt was given ativan.     OBJECTIVE:  Vital Signs Last 24 Hrs  T(C): 36.3 (16 Feb 2025 08:59), Max: 37.5 (16 Feb 2025 05:16)  T(F): 97.4 (16 Feb 2025 08:59), Max: 99.5 (16 Feb 2025 05:16)  HR: 100 (16 Feb 2025 11:32) (62 - 100)  BP: 120/78 (16 Feb 2025 11:32) (105/70 - 122/82)  BP(mean): 94 (16 Feb 2025 11:32) (83 - 98)  RR: 18 (16 Feb 2025 11:32) (14 - 18)  SpO2: 97% (16 Feb 2025 11:32) (75% - 100%)    Parameters below as of 16 Feb 2025 11:32  Patient On (Oxygen Delivery Method): ventilator    O2 Concentration (%): 40    PHYSICAL EXAM:  Gen: Reclining in bed at time of exam  HEENT: trach in place   CV: RRR, +S1/S2  Pulm: adequate respiratory effort, no increase in work of breathing  Abd: soft, ND  Skin: warm and dry,   Neuro: awake, does not talk, opening eyes to voice       LABS:                        11.1   5.77  )-----------( 256      ( 16 Feb 2025 07:26 )             34.6     02-16    138  |  99  |  46[H]  ----------------------------<  98  4.0   |  24  |  1.21    Ca    9.4      16 Feb 2025 07:26  Phos  4.8     02-16  Mg     2.2     02-16    CAPILLARY BLOOD GLUCOSE  POCT Blood Glucose.: 130 mg/dL (15 Feb 2025 16:14)    2/16 CXR:  Discoid changes right lung base. Discoid change and/or   infiltrate left lung base. Small left effusion      acetaminophen   Oral Liquid .. 650 milliGRAM(s) Oral every 6 hours PRN  albuterol/ipratropium for Nebulization 3 milliLiter(s) Nebulizer every 4 hours  amLODIPine   Tablet 5 milliGRAM(s) Oral daily  artificial tears (preservative free) Ophthalmic Solution 1 Drop(s) Right EYE every 4 hours  baclofen 5 milliGRAM(s) Oral every 12 hours  chlorhexidine 0.12% Liquid 15 milliLiter(s) Oral Mucosa every 12 hours  chlorhexidine 2% Cloths 1 Application(s) Topical <User Schedule>  doxazosin 8 milliGRAM(s) Oral at bedtime  erythromycin   Ointment 1 Application(s) Both EYES four times a day  fluticasone propionate 50 MICROgram(s)/spray Nasal Spray 1 Spray(s) Both Nostrils two times a day  heparin   Injectable 5000 Unit(s) SubCutaneous every 8 hours  influenza   Vaccine 0.5 milliLiter(s) IntraMuscular once  levETIRAcetam 1500 milliGRAM(s) Oral every 12 hours  LORazepam   Injectable 2 milliGRAM(s) IV Push once PRN  pantoprazole   Suspension 40 milliGRAM(s) Oral daily  petrolatum Ophthalmic Ointment 1 Application(s) Both EYES four times a day  phenytoin   Suspension 150 milliGRAM(s) Oral <User Schedule>  phenytoin   Suspension 200 milliGRAM(s) Oral <User Schedule>  polyethylene glycol 3350 17 Gram(s) Oral daily  propranolol 5 milliGRAM(s) Oral every 12 hours  senna 2 Tablet(s) Oral at bedtime  sodium chloride 0.65% Nasal 1 Spray(s) Both Nostrils two times a day

## 2025-02-17 LAB
ANION GAP SERPL CALC-SCNC: 13 MMOL/L — SIGNIFICANT CHANGE UP (ref 5–17)
ANION GAP SERPL CALC-SCNC: 15 MMOL/L — SIGNIFICANT CHANGE UP (ref 5–17)
BUN SERPL-MCNC: 50 MG/DL — HIGH (ref 7–23)
BUN SERPL-MCNC: 50 MG/DL — HIGH (ref 7–23)
CALCIUM SERPL-MCNC: 9.1 MG/DL — SIGNIFICANT CHANGE UP (ref 8.4–10.5)
CALCIUM SERPL-MCNC: 9.5 MG/DL — SIGNIFICANT CHANGE UP (ref 8.4–10.5)
CHLORIDE SERPL-SCNC: 98 MMOL/L — SIGNIFICANT CHANGE UP (ref 96–108)
CHLORIDE SERPL-SCNC: 99 MMOL/L — SIGNIFICANT CHANGE UP (ref 96–108)
CO2 SERPL-SCNC: 22 MMOL/L — SIGNIFICANT CHANGE UP (ref 22–31)
CO2 SERPL-SCNC: 25 MMOL/L — SIGNIFICANT CHANGE UP (ref 22–31)
CREAT SERPL-MCNC: 1.24 MG/DL — SIGNIFICANT CHANGE UP (ref 0.5–1.3)
CREAT SERPL-MCNC: 1.26 MG/DL — SIGNIFICANT CHANGE UP (ref 0.5–1.3)
EGFR: 71 ML/MIN/1.73M2 — SIGNIFICANT CHANGE UP
EGFR: 71 ML/MIN/1.73M2 — SIGNIFICANT CHANGE UP
EGFR: 73 ML/MIN/1.73M2 — SIGNIFICANT CHANGE UP
EGFR: 73 ML/MIN/1.73M2 — SIGNIFICANT CHANGE UP
GLUCOSE SERPL-MCNC: 110 MG/DL — HIGH (ref 70–99)
GLUCOSE SERPL-MCNC: 125 MG/DL — HIGH (ref 70–99)
GRAM STN FLD: ABNORMAL
HCT VFR BLD CALC: 33.2 % — LOW (ref 39–50)
HGB BLD-MCNC: 10.4 G/DL — LOW (ref 13–17)
MAGNESIUM SERPL-MCNC: 2.2 MG/DL — SIGNIFICANT CHANGE UP (ref 1.6–2.6)
MCHC RBC-ENTMCNC: 30.2 PG — SIGNIFICANT CHANGE UP (ref 27–34)
MCHC RBC-ENTMCNC: 31.3 G/DL — LOW (ref 32–36)
MCV RBC AUTO: 96.5 FL — SIGNIFICANT CHANGE UP (ref 80–100)
NRBC BLD AUTO-RTO: 0 /100 WBCS — SIGNIFICANT CHANGE UP (ref 0–0)
PHENYTOIN FREE SERPL-MCNC: 18.5 UG/ML — SIGNIFICANT CHANGE UP (ref 10–20)
PHOSPHATE SERPL-MCNC: 4.5 MG/DL — SIGNIFICANT CHANGE UP (ref 2.5–4.5)
PLATELET # BLD AUTO: 180 K/UL — SIGNIFICANT CHANGE UP (ref 150–400)
POTASSIUM SERPL-MCNC: 3.6 MMOL/L — SIGNIFICANT CHANGE UP (ref 3.5–5.3)
POTASSIUM SERPL-MCNC: SIGNIFICANT CHANGE UP (ref 3.5–5.3)
POTASSIUM SERPL-SCNC: 3.6 MMOL/L — SIGNIFICANT CHANGE UP (ref 3.5–5.3)
POTASSIUM SERPL-SCNC: SIGNIFICANT CHANGE UP (ref 3.5–5.3)
RBC # BLD: 3.44 M/UL — LOW (ref 4.2–5.8)
RBC # FLD: 12.6 % — SIGNIFICANT CHANGE UP (ref 10.3–14.5)
SODIUM SERPL-SCNC: 133 MMOL/L — LOW (ref 135–145)
SODIUM SERPL-SCNC: 139 MMOL/L — SIGNIFICANT CHANGE UP (ref 135–145)
SPECIMEN SOURCE: SIGNIFICANT CHANGE UP
WBC # BLD: 8.05 K/UL — SIGNIFICANT CHANGE UP (ref 3.8–10.5)
WBC # FLD AUTO: 8.05 K/UL — SIGNIFICANT CHANGE UP (ref 3.8–10.5)

## 2025-02-17 PROCEDURE — 99233 SBSQ HOSP IP/OBS HIGH 50: CPT

## 2025-02-17 PROCEDURE — G0545: CPT

## 2025-02-17 PROCEDURE — 71250 CT THORAX DX C-: CPT | Mod: 26

## 2025-02-17 RX ORDER — PHENYTOIN 100 MG/4ML
800 SUSPENSION, ORAL (FINAL DOSE FORM) ORAL ONCE
Refills: 0 | Status: DISCONTINUED | OUTPATIENT
Start: 2025-02-17 | End: 2025-02-17

## 2025-02-17 RX ORDER — SULFAMETHOXAZOLE/TRIMETHOPRIM 800-160 MG
2 TABLET ORAL THREE TIMES A DAY
Refills: 0 | Status: DISCONTINUED | OUTPATIENT
Start: 2025-02-17 | End: 2025-02-17

## 2025-02-17 RX ORDER — MINOCYCLINE HCL 50 MG
200 TABLET ORAL
Refills: 0 | Status: DISCONTINUED | OUTPATIENT
Start: 2025-02-17 | End: 2025-02-17

## 2025-02-17 RX ORDER — SULFAMETHOXAZOLE/TRIMETHOPRIM 800-160 MG
2 TABLET ORAL EVERY 8 HOURS
Refills: 0 | Status: DISCONTINUED | OUTPATIENT
Start: 2025-02-17 | End: 2025-02-17

## 2025-02-17 RX ORDER — LORAZEPAM 4 MG/ML
1 VIAL (ML) INJECTION THREE TIMES A DAY
Refills: 0 | Status: DISCONTINUED | OUTPATIENT
Start: 2025-02-17 | End: 2025-02-18

## 2025-02-17 RX ORDER — SULFAMETHOXAZOLE/TRIMETHOPRIM 800-160 MG
2 TABLET ORAL EVERY 8 HOURS
Refills: 0 | Status: DISCONTINUED | OUTPATIENT
Start: 2025-02-17 | End: 2025-02-19

## 2025-02-17 RX ORDER — MINOCYCLINE HCL 50 MG
200 TABLET ORAL
Refills: 0 | Status: DISCONTINUED | OUTPATIENT
Start: 2025-02-17 | End: 2025-02-19

## 2025-02-17 RX ADMIN — Medication 1 DROP(S): at 05:13

## 2025-02-17 RX ADMIN — AMLODIPINE BESYLATE 5 MILLIGRAM(S): 10 TABLET ORAL at 05:17

## 2025-02-17 RX ADMIN — Medication 1 APPLICATION(S): at 17:52

## 2025-02-17 RX ADMIN — Medication 1 APPLICATION(S): at 12:14

## 2025-02-17 RX ADMIN — FLUTICASONE PROPIONATE 1 SPRAY(S): 50 SPRAY, METERED NASAL at 05:12

## 2025-02-17 RX ADMIN — Medication 1 APPLICATION(S): at 00:03

## 2025-02-17 RX ADMIN — Medication 1 APPLICATION(S): at 05:24

## 2025-02-17 RX ADMIN — Medication 150 MILLIGRAM(S): at 11:15

## 2025-02-17 RX ADMIN — ERYTHROMYCIN 1 APPLICATION(S): 5 OINTMENT OPHTHALMIC at 23:45

## 2025-02-17 RX ADMIN — HEPARIN SODIUM 5000 UNIT(S): 1000 INJECTION INTRAVENOUS; SUBCUTANEOUS at 22:39

## 2025-02-17 RX ADMIN — Medication 40 MILLIGRAM(S): at 11:14

## 2025-02-17 RX ADMIN — IPRATROPIUM BROMIDE AND ALBUTEROL SULFATE 3 MILLILITER(S): .5; 2.5 SOLUTION RESPIRATORY (INHALATION) at 17:50

## 2025-02-17 RX ADMIN — Medication 1 DROP(S): at 22:40

## 2025-02-17 RX ADMIN — BACLOFEN 5 MILLIGRAM(S): 10 INJECTION INTRATHECAL at 05:16

## 2025-02-17 RX ADMIN — Medication 40 MILLIEQUIVALENT(S): at 13:28

## 2025-02-17 RX ADMIN — IPRATROPIUM BROMIDE AND ALBUTEROL SULFATE 3 MILLILITER(S): .5; 2.5 SOLUTION RESPIRATORY (INHALATION) at 22:38

## 2025-02-17 RX ADMIN — Medication 2 TABLET(S): at 22:38

## 2025-02-17 RX ADMIN — ERYTHROMYCIN 1 APPLICATION(S): 5 OINTMENT OPHTHALMIC at 05:12

## 2025-02-17 RX ADMIN — Medication 1 SPRAY(S): at 05:12

## 2025-02-17 RX ADMIN — IPRATROPIUM BROMIDE AND ALBUTEROL SULFATE 3 MILLILITER(S): .5; 2.5 SOLUTION RESPIRATORY (INHALATION) at 01:31

## 2025-02-17 RX ADMIN — Medication 200 MILLIGRAM(S): at 18:27

## 2025-02-17 RX ADMIN — Medication 150 MILLIGRAM(S): at 03:23

## 2025-02-17 RX ADMIN — BACLOFEN 5 MILLIGRAM(S): 10 INJECTION INTRATHECAL at 17:50

## 2025-02-17 RX ADMIN — Medication 200 MILLIGRAM(S): at 19:30

## 2025-02-17 RX ADMIN — HEPARIN SODIUM 5000 UNIT(S): 1000 INJECTION INTRAVENOUS; SUBCUTANEOUS at 13:22

## 2025-02-17 RX ADMIN — ERYTHROMYCIN 1 APPLICATION(S): 5 OINTMENT OPHTHALMIC at 17:51

## 2025-02-17 RX ADMIN — FLUTICASONE PROPIONATE 1 SPRAY(S): 50 SPRAY, METERED NASAL at 17:51

## 2025-02-17 RX ADMIN — Medication 1 MILLIGRAM(S): at 22:38

## 2025-02-17 RX ADMIN — Medication 1 DROP(S): at 17:51

## 2025-02-17 RX ADMIN — Medication 1 DROP(S): at 10:10

## 2025-02-17 RX ADMIN — IPRATROPIUM BROMIDE AND ALBUTEROL SULFATE 3 MILLILITER(S): .5; 2.5 SOLUTION RESPIRATORY (INHALATION) at 13:22

## 2025-02-17 RX ADMIN — Medication 1 DROP(S): at 01:42

## 2025-02-17 RX ADMIN — Medication 15 MILLILITER(S): at 05:14

## 2025-02-17 RX ADMIN — POLYETHYLENE GLYCOL 3350 17 GRAM(S): 17 POWDER, FOR SOLUTION ORAL at 11:14

## 2025-02-17 RX ADMIN — LEVETIRACETAM 1500 MILLIGRAM(S): 10 INJECTION, SOLUTION INTRAVENOUS at 05:16

## 2025-02-17 RX ADMIN — HEPARIN SODIUM 5000 UNIT(S): 1000 INJECTION INTRAVENOUS; SUBCUTANEOUS at 05:13

## 2025-02-17 RX ADMIN — IPRATROPIUM BROMIDE AND ALBUTEROL SULFATE 3 MILLILITER(S): .5; 2.5 SOLUTION RESPIRATORY (INHALATION) at 10:10

## 2025-02-17 RX ADMIN — IPRATROPIUM BROMIDE AND ALBUTEROL SULFATE 3 MILLILITER(S): .5; 2.5 SOLUTION RESPIRATORY (INHALATION) at 05:15

## 2025-02-17 RX ADMIN — LEVETIRACETAM 1500 MILLIGRAM(S): 10 INJECTION, SOLUTION INTRAVENOUS at 17:50

## 2025-02-17 RX ADMIN — Medication 1 SPRAY(S): at 17:51

## 2025-02-17 RX ADMIN — DOXAZOSIN MESYLATE 8 MILLIGRAM(S): 8 TABLET ORAL at 22:39

## 2025-02-17 RX ADMIN — Medication 1 DROP(S): at 13:33

## 2025-02-17 RX ADMIN — ERYTHROMYCIN 1 APPLICATION(S): 5 OINTMENT OPHTHALMIC at 11:14

## 2025-02-17 RX ADMIN — Medication 15 MILLILITER(S): at 17:51

## 2025-02-17 NOTE — PROGRESS NOTE ADULT - ASSESSMENT
Episodes of desaturation following trach exchange on 2/14, sputum culture from bronch on 2/13 grew Stenotrophomonas, with which he has been colonized for months.  CXR on 2/16 shows possible LLL infiltrate - if present, may represent lag.  He remains afebrile, no leukocytosis.  Would not want to treat for Stenotrophomonas pneumonia unless clearly needed as two drugs with significant toxicities are required.  Suggest:  - Please repeat tracheal aspirate for culture  - CT chest (noncon)  - If he is to start antibiotics, please send blood cultures X 2 sets before started  Recommendations discussed with primary team. Dr Lee will cover on 2/17, I will resume care 2/18, team 2.  Episodes of desaturation following trach exchange on 2/14, sputum culture from bronch on 2/13 grew Stenotrophomonas, with which he has been colonized for months.  CXR on 2/16 shows possible LLL infiltrate - if present, may represent lag. CT chest w/o contrast 2/17 shows near complete collapse b/l lower lobes; patchy opacity within aerated LLL and ALONDRA (infectious vs  inflammatory). Hypoxia, mucopurulent secretions, temp 100.3 on 2/16, however no leukocytosis. Given     Recommendations  - F/u repeat tracheal aspirate for culture  - Add on differential and LFTs to AM labs from today  - Send blood cultures X 2 sets prior to starting abx  - Start bactrim DS 2 tabs via PEG q8h  - Start minocycline 200mg via PEG q12h  - Discuss with nutrition and pharmacy regarding holding TFs before/after minocycline administration  - Frequent suctioning and airway clearance per pulm  - F/u pulmnology recs    Case discussed with attending Dr. Lee. Team 2 will continue to follow with you. Dr. Velasco will resume care tomorrow.

## 2025-02-17 NOTE — CHART NOTE - NSCHARTNOTEFT_GEN_A_CORE
Team requesting for updated tube feed recommendations since patient starting on a new medication that requires feeds to be held for 2 hours before and after administration and patient will receive this medication 2x/day for total of 8 hours where feeds need to be held. Discussed changing tube feed regimen to 16 hours total with two 4-hour breaks when medication is administered.     Updated tube feed recommendation:  Maribeth Sauceda renal @68mL/hr x16hrs to provide 1088mL total volume, 1958kcal, 87g protein, 718mL free water meeting 26kcal/kg, 1.1g/kg protein based on recent weight of 76.1kg obtained by RD.     RD to remain available for additional recommendations.    Lorelei Vinson, MS, RDN, CDN  ([id number])

## 2025-02-17 NOTE — PROCEDURE NOTE - NSUS ED ADDITIONAL DETAIL1 FT
Ultrasound guided peripheral venous access was performed for this patient. A 20 gauge was placed to the patient's right AC region. The IV was confirmed to be patent through a flush, where there was no resistance.
dense LLL consolidation  otherwise a line pattern b/l   no effusions

## 2025-02-17 NOTE — PROGRESS NOTE ADULT - ASSESSMENT
44 year-old-male with unclear PMH (?stroke, MI) who was found down at work, intubated for airway protection and found to have acute large parenchymal hemorrhage within nabil with mass effect (+ acute/subacute right cerebellar infarct) in setting of hypertensive emergency, and R cerebellar stroke transferred to [facility] for further neurosurgical care. 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 Epilepsy recalled as pt with vertical eye movements that appeared rhythmic and resolved after given AM Keppra +additional 500mg. Dilantin level 3.3. High suspicion for continued focal seizures, however given location of the bleed, initially suspected that repeating EEG will have limited utility. Unclear if EEG negative for focal seizures or hemifacial spasm is from pontine irritation. Given subtherapeutic level, we recommend increasing Dilantin and monitoring clinically for improvement. On 1/26, pt was repotedly foaming at the mouth, less responsive with increasing in facial twitching (video reviewed by attending, which seemed equivocal to prior). vEEG was restarted Ativan 8mg and Fosphenytoin 1500mg was given by primary team. Epilepsy recommended increasing phenytoin to 150/150/200mg. Level on 1/27 was 18.2 vEEG was negative so was discontinued. On reevaluation 1/30, facial twitching appears improved, localizing only to the L lower mouth and less pronounced than prior (previously in the upper face and lower mouth). Level on 2/17 is 18.5. On 2/16, patient had an episode of moving head, arms and leg uncontrollably with desaturation treated with Ativan 4 mg IV stopping movements. Placed patient on vEEG today.On reevaluation, patient presenting with similar previous twitching movements that were described before. BUN/Cr trending up potentially contributing to those movements. Nonetheless, decided to treat them with Ativan.    Recommendations:  - Management by primary team   - Continue vEEG  - Continue Keppra 1500mg Q12hrs PO  - Continue Phenytoin to 150/150/200mg PO  - Start Ativan 1 mg q8h  - Maintain seizure/fall/aspiration precautions  - If any neurological changes or any further question, please contact back epilepsy    Discussed with Attending Dr. Valencia 44 year-old-male with unclear PMH (?stroke, MI) who was found down at work, intubated for airway protection and found to have acute large parenchymal hemorrhage within nabil with mass effect (+ acute/subacute right cerebellar infarct) in setting of hypertensive emergency, and R cerebellar stroke transferred to [facility] for further neurosurgical care. 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 Epilepsy recalled as pt with vertical eye movements that appeared rhythmic and resolved after given AM Keppra +additional 500mg. Dilantin level 3.3. High suspicion for continued focal seizures, however given location of the bleed, initially suspected that repeating EEG will have limited utility. Unclear if EEG negative for focal seizures or hemifacial spasm is from pontine irritation. Given subtherapeutic level, we recommend increasing Dilantin and monitoring clinically for improvement. On 1/26, pt was repotedly foaming at the mouth, less responsive with increasing in facial twitching (video reviewed by attending, which seemed equivocal to prior). vEEG was restarted Ativan 8mg and Fosphenytoin 1500mg was given by primary team. Epilepsy recommended increasing phenytoin to 150/150/200mg. Level on 1/27 was 18.2 vEEG was negative so was discontinued. On reevaluation 1/30, facial twitching appears improved, localizing only to the L lower mouth and less pronounced than prior (previously in the upper face and lower mouth). Level on 2/17 is 18.5. On 2/16, patient had an episode of moving head, arms and leg uncontrollably with desaturation treated with Ativan 4 mg IV stopping movements. Placed patient on vEEG today.On reevaluation, patient presenting with similar previous twitching movements that were described before. BUN/Cr trending up potentially contributing to those movements. Nonetheless, decided to treat them with Ativan.    Recommendations:  - Management by primary team   - Continue vEEG  - Continue Keppra 1500mg Q12hrs PO  - Continue Phenytoin to 150/150/200mg PO  - Start Ativan 1 mg q8h  - Maintain seizure/fall/aspiration precautions  - If any neurological changes or any further question, please contact back epilepsy    Discussed with Attending Dr. Valencia 44 year-old-male with unclear PMH (?stroke, MI) who was found down at work, intubated for airway protection and found to have acute large parenchymal hemorrhage within nabil with mass effect (+ acute/subacute right cerebellar infarct) in setting of hypertensive emergency, and R cerebellar stroke transferred to [facility] for further neurosurgical care. 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 Epilepsy recalled as pt with vertical eye movements that appeared rhythmic and resolved after given AM Keppra +additional 500mg. Dilantin level 3.3. High suspicion for continued focal seizures, however given location of the bleed, initially suspected that repeating EEG will have limited utility. Unclear if EEG negative for focal seizures or hemifacial spasm is from pontine irritation. Given subtherapeutic level, we recommend increasing Dilantin and monitoring clinically for improvement. On 1/26, pt was repotedly foaming at the mouth, less responsive with increasing in facial twitching (video reviewed by attending, which seemed equivocal to prior). vEEG was restarted Ativan 8mg and Fosphenytoin 1500mg was given by primary team. Epilepsy recommended increasing phenytoin to 150/150/200mg. Level on 1/27 was 18.2 vEEG was negative so was discontinued. On reevaluation 1/30, facial twitching appears improved, localizing only to the L lower mouth and less pronounced than prior (previously in the upper face and lower mouth). Level on 2/17 is 18.5. On 2/16, patient had an episode of moving head, arms and leg uncontrollably with desaturation treated with Ativan 4 mg IV stopping movements. Placed patient on vEEG today.On reevaluation, patient presenting with similar previous twitching movements that were described before. BUN/Cr trending up potentially contributing to those movements. Nonetheless, we decide to start Ativan to control movements    Recommendations:  - Management by primary team   - Continue vEEG  - Continue Keppra 1500mg Q12hrs PO  - Continue Phenytoin to 150/150/200mg PO  - Start Ativan 1 mg q8h  - Maintain seizure/fall/aspiration precautions  - If any neurological changes or any further question, please contact back epilepsy    Discussed with Attending Dr. Valencia

## 2025-02-17 NOTE — EEG REPORT - NS EEG TEXT BOX
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  Prolonged video EEG obtained to rule out subclinical seizures.    Diagnosis code:   R56.9 convulsions/seizure  The live video was: unmonitored.    Pertinent Medications:  Levetiracetam  Phenytoin    Acquisition details:  Electroencephalography was acquired using a minimum of 21 channels on an [employer] system v 9.3.1 with electrode placement according to the standard International 10-20 system following ACNS (American Clinical Neurophysiology Society) guidelines for Long-Term Video EEG monitoring.  Anterior temporal T1 and T2 electrodes were utilized whenever possible.   The [employer] automated spike & seizure detections were all reviewed in detail, in addition to extensive portions of raw EEG.      Day 1:  From 2025 at 07:01:38 PM to 2025 at 07:00:00 AM  Background: Mixture of poorly organized very low voltage delta frequencies.  Generalized Slowing: Severe  Symmetry:  No persistent asymmetries of voltage or frequency.  Posterior Dominant Rhythm:  A sustained posterior dominant rhythm was not present.  Organization: Poorly organized  Voltage:  Low voltage.  Variability: Yes. 		Reactivity: Yes.  N2 sleep: None of the expected sleep patterns were present  Spontaneous Activity:  Occasional bursts of periodic lateralized epileptiform discharges were present over the right anterior quadrant, with a waxing and waning appearance.  Periodic/rhythmic activity:  None  Events:    Several right anterior quadrant electrographic seizures were captured on this day. The seizures occurred at 22:56:20, 23:13:20, 23:39:20, 23:48:50, 23:53:10 on 2025; and on 01:09:20, 01:29:00, 06:00:10 on 2025. The seizures had a maximal duration of 6 minutes and 4 seconds.  Patient was well seen on camera during the seizures, without any apparent symptoms.  The electrographic correlate for these seizures was characterized by a build up of repetitive spikes which evolved in frequency and voltage, until offset.    A right anterior quadrant electroclinical seizure was captured on this date, at 05:50:10.   Patient was well seen on camera during the seizure, with mouth automatisms, and repetitive pulling of the left angle of the mouth. This seizure had a duration of 6 minutes and 29 seconds.  The electrographic correlate for this seizure was characterized by a build up of repetitive spikes which evolved in frequency and voltage, until offset.  Provocations:  Hyperventilation and Photic stimulation were not performed.    Daily Summary:    Abnormal, due to the presence of:  1)	Electroclinical seizure, focal semiology, right anterior quadrant onset  2)	Electrographic seizures, right anterior quadrant onset  3)	Occasional periodic lateralizing epileptiform discharges (PLEDs), right anterior quadrant  4)	Poor level of organization  5)	Voltage attenuation, diffuse  6)	Generalized background slowing  The above mentioned findings are consistent with the presence of marked epileptogenicity in the right anterior quadrant.  In addition, severe diffuse cerebral dysfunction is present.  Both electroclinical as well as electrographic seizures with a right anterior quadrant onset were captured.      Maite Valencia MD  Attending Neurologist, [facility] Epilepsy Program

## 2025-02-17 NOTE — PROGRESS NOTE ADULT - SUBJECTIVE AND OBJECTIVE BOX
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 s/p vanc (12/15-12/16), s/p zosyn (1/12 -1/18)  - s/p Stenotrophomonas maltophilia PNA: s/p Bactrim (11/18-11/19) s/p Cefepime (11/16-11/18)  - 11/3, (+) sputum for stenotrophomas maltophlia, blood cx (+) klebsiella, cefepime 2gq12 (11/6 - 11/12)   - S/p Ancef (10/4-10/14) for PNA, and s/p Unasyn (10/18-10/23) +actinobacter baumanii     MISC:  - BL keratitis: BL erythromycin ointment, artificial tears, lacrilube q4, moisture chamber   - Penile wound: wound care rec barrier wipes     Dispo: SDU status, DNR, pending SNF    D/w Dr. D'Amico     Assessment: present when checked     [] GCS   E   V   M     Heart Failure: [] Acute, [] acute on chronic, [] chronic   Heart Failure: [] Diastolic (HFpEF), [] Systolic (HRrEF), [] Combined (HFpEF and HFrEF), [] RHF, [] Pulm HTN, [] Other     [] ANIKA, [] ATN, [] AIN, [] other   [] CKD1, [] CKD2, [] CKD3, [] CKD4, [] CKD5, [] ESRD     Encephalopathy: [] Metabolic, [] Hepatic, [] Toxic, [] Neurological, [] Other     Abnormal Nutritional Status: [] malnutrition (see nutrition note), []underweight: BMI <19, [] morbid obesity: BMI >40, [] Cachexia     [] Sepsis   [] Hypovolemic shock, [] Cardiogenic shock, [] Hemorrhagic shock, [] Neurogenic shock   [] Acute respiratory failure   [] Cerebral edema, [] Brain compression / herniation   [] Functional quadriplegia   [] Acute blood loss anemia

## 2025-02-17 NOTE — CONSULT NOTE ADULT - SUBJECTIVE AND OBJECTIVE BOX
[Normocephalic] : normocephalic [Atraumatic] : atraumatic [Supple] : supple [No Supraclavicular Adenopathy] : no supraclavicular adenopathy [No Cervical Adenopathy] : no cervical adenopathy [Examined in the supine and seated position] : examined in the supine and seated position [Symmetrical] : symmetrical PULMONARY SERVICE INITIAL CONSULT NOTE    HPI:  44 year old, unknown past medical history (reported stroke and MI by coworkers) presented to [facility] with AMS, Pt was working at [employer] when he was found down by coworkers. EMS called and pt brought to [facility] ED. Intubated, sedated, started on cardene for SBPs in 200s. CT head showed brain stem bleed. Patient respiratory status stable but unable to be extubated as he is not waking up. Pulmonary team contacted for evaluation for tracheostomy    REVIEW OF SYSTEMS:  All additional ROS negative.    PAST MEDICAL & SURGICAL HISTORY:  Acute myocardial infarction      CVA (cerebral vascular accident)          FAMILY HISTORY:      SOCIAL HISTORY:  Smoking Status: [ ] Current, [ ] Former, [ ] Never  Pack Years:    MEDICATIONS:  Pulmonary:  acetylcysteine 10%  Inhalation 4 milliLiter(s) Inhalation every 12 hours  albuterol/ipratropium for Nebulization 3 milliLiter(s) Nebulizer every 12 hours    Antimicrobials:  ceFAZolin   IVPB 1000 milliGRAM(s) IV Intermittent every 12 hours    Anticoagulants:  heparin   Injectable 5000 Unit(s) SubCutaneous every 8 hours    Onc:    GI/:  pantoprazole  Injectable 40 milliGRAM(s) IV Push daily  simethicone 80 milliGRAM(s) Chew every 6 hours  tamsulosin 0.4 milliGRAM(s) Oral at bedtime    Endocrine:  insulin lispro (ADMELOG) corrective regimen sliding scale   SubCutaneous every 6 hours    Cardiac:  amLODIPine   Tablet 10 milliGRAM(s) Oral daily  hydrALAZINE 50 milliGRAM(s) Oral every 8 hours  labetalol 300 milliGRAM(s) Enteral Tube every 8 hours    Other Medications:  acetaminophen   Oral Liquid .. 650 milliGRAM(s) Oral every 6 hours PRN  calcium acetate 667 milliGRAM(s) Oral three times a day with meals  chlorhexidine 0.12% Liquid 15 milliLiter(s) Oral Mucosa every 12 hours  chlorhexidine 2% Cloths 1 Application(s) Topical <User Schedule>  lactated ringers. 1000 milliLiter(s) IV Continuous <Continuous>  petrolatum Ophthalmic Ointment 1 Application(s) Both EYES two times a day      Allergies    Allergy Status Unknown    Intolerances        Vital Signs Last 24 Hrs  T(C): 36.4 (12 Oct 2024 09:03), Max: 38.4 (11 Oct 2024 22:50)  T(F): 97.5 (12 Oct 2024 09:03), Max: 101.1 (11 Oct 2024 22:50)  HR: 78 (12 Oct 2024 10:00) (76 - 94)  BP: --  BP(mean): --  RR: 10 (12 Oct 2024 10:00) (9 - 16)  SpO2: 99% (12 Oct 2024 10:00) (93% - 100%)    Parameters below as of 12 Oct 2024 08:00  Patient On (Oxygen Delivery Method): BiPAP/CPAP, VENTILATOR    O2 Concentration (%): 40    10-11 @ 07:01  -  10-12 @ 07:00  --------------------------------------------------------  IN: 4280 mL / OUT: 4575 mL / NET: -295 mL    10-12 @ 07:01  -  10-12 @ 12:15  --------------------------------------------------------  IN: 0 mL / OUT: 150 mL / NET: -150 mL      Mode: PS (Pressure Support)/ Spontaneous  TV (machine): 71  FiO2: 40  PEEP: 5  PS: 5  MAP: 6  PIP: 10      PHYSICAL EXAM:    Head: NC/AT  EENT: PERRL, anicteric sclera; oropharynx clear, MMM  Neck: supple, no appreciable JVD  Respiratory: CTA B/L; no W/R/R  Cardiovascular: +S1/S2, RRR  Gastrointestinal: soft, NT/ND  Extremities: WWP; no edema, clubbing or cyanosis  Vascular: 2+ radial pulses B/L  Neurological: awake and alert; BURROUGHS    LABS:      CBC Full  -  ( 12 Oct 2024 06:47 )  WBC Count : 7.29 K/uL  RBC Count : 3.75 M/uL  Hemoglobin : 10.8 g/dL  Hematocrit : 33.9 %  Platelet Count - Automated : 250 K/uL  Mean Cell Volume : 90.4 fl  Mean Cell Hemoglobin : 28.8 pg  Mean Cell Hemoglobin Concentration : 31.9 gm/dL  Auto Neutrophil # : x  Auto Lymphocyte # : x  Auto Monocyte # : x  Auto Eosinophil # : x  Auto Basophil # : x  Auto Neutrophil % : x  Auto Lymphocyte % : x  Auto Monocyte % : x  Auto Eosinophil % : x  Auto Basophil % : x    10-12    139  |  105  |  39[H]  ----------------------------<  104[H]  4.2   |  22  |  3.54[H]    Ca    8.3[L]      12 Oct 2024 06:47  Phos  4.6     10-12  Mg     2.2     10-12            Urinalysis Basic - ( 12 Oct 2024 06:47 )    Color: x / Appearance: x / SG: x / pH: x  Gluc: 104 mg/dL / Ketone: x  / Bili: x / Urobili: x   Blood: x / Protein: x / Nitrite: x   Leuk Esterase: x / RBC: x / WBC x   Sq Epi: x / Non Sq Epi: x / Bacteria: x                RADIOLOGY & ADDITIONAL STUDIES: [No Axillary Lymphadenopathy] : no left axillary lymphadenopathy [Full ROM] : full range of motion [No Rashes] : no rashes [No Ulceration] : no ulceration [de-identified] : Normal respiratory effort [de-identified] : Left breast and axillary surgical incision are well-healed, no parenchymal defect. No palpable mass in either breast. No clinical lymphadenopathy.

## 2025-02-17 NOTE — PROGRESS NOTE ADULT - SUBJECTIVE AND OBJECTIVE BOX
Patient was seen and examined at bedside. Case discuss with the primary team. Pt without events overnight.     OBJECTIVE:  Vital Signs Last 24 Hrs  T(C): 36.3 (17 Feb 2025 08:59), Max: 37.9 (16 Feb 2025 15:46)  T(F): 97.3 (17 Feb 2025 08:59), Max: 100.3 (16 Feb 2025 15:46)  HR: 66 (17 Feb 2025 08:50) (66 - 100)  BP: 106/74 (17 Feb 2025 08:30) (100/64 - 120/78)  BP(mean): 84 (17 Feb 2025 08:30) (79 - 94)  RR: 14 (17 Feb 2025 08:50) (14 - 18)  SpO2: 96% (17 Feb 2025 08:50) (94% - 99%)    Parameters below as of 17 Feb 2025 08:50  Patient On (Oxygen Delivery Method): ventilator    O2 Concentration (%): 40      PHYSICAL EXAM:  Gen: Reclining in bed at time of exam; Pt was on EEG at time of evaluation; mild facial twitching   HEENT: trach in place   CV: RRR, +S1/S2  Pulm: adequate respiratory effort, no increase in work of breathing  Abd: soft, ND  Skin: warm and dry,   Neuro: awake, does not talk, opening eyes to voice     LABS:                        10.4   8.05  )-----------( 180      ( 17 Feb 2025 05:30 )             33.2     02-17    133[L]  |  98  |  50[H]  ----------------------------<  125[H]  See Note   |  22  |  1.24    Ca    9.1      17 Feb 2025 05:30  Phos  4.5     02-17  Mg     2.2     02-17 2/16 CT chest w/o contrast: Near complete collapse both lower lobes. Patchy opacity within the   aerated left lower lobe as well as the left upper lobes may be infectious   or inflammatory.    2/13 BAL cx: stenotrophomonas   2/16 RVP/COVID: negative     acetaminophen   Oral Liquid .. 650 milliGRAM(s) Oral every 6 hours PRN  albuterol/ipratropium for Nebulization 3 milliLiter(s) Nebulizer every 4 hours  amLODIPine   Tablet 5 milliGRAM(s) Oral daily  artificial tears (preservative free) Ophthalmic Solution 1 Drop(s) Right EYE every 4 hours  baclofen 5 milliGRAM(s) Oral every 12 hours  chlorhexidine 0.12% Liquid 15 milliLiter(s) Oral Mucosa every 12 hours  chlorhexidine 2% Cloths 1 Application(s) Topical <User Schedule>  doxazosin 8 milliGRAM(s) Oral at bedtime  erythromycin   Ointment 1 Application(s) Both EYES four times a day  fluticasone propionate 50 MICROgram(s)/spray Nasal Spray 1 Spray(s) Both Nostrils two times a day  heparin   Injectable 5000 Unit(s) SubCutaneous every 8 hours  influenza   Vaccine 0.5 milliLiter(s) IntraMuscular once  levETIRAcetam 1500 milliGRAM(s) Oral every 12 hours  LORazepam   Injectable 2 milliGRAM(s) IV Push once PRN  pantoprazole   Suspension 40 milliGRAM(s) Oral daily  petrolatum Ophthalmic Ointment 1 Application(s) Both EYES four times a day  phenytoin   Suspension 150 milliGRAM(s) Oral <User Schedule>  phenytoin   Suspension 200 milliGRAM(s) Oral <User Schedule>  polyethylene glycol 3350 17 Gram(s) Oral daily  propranolol 5 milliGRAM(s) Oral every 12 hours  senna 2 Tablet(s) Oral at bedtime  sodium chloride 0.65% Nasal 1 Spray(s) Both Nostrils two times a day

## 2025-02-17 NOTE — PROGRESS NOTE ADULT - ASSESSMENT
44M with unclear PMH (?stroke, MI) who was found down at work, intubated for airway protection and found to have acute parenchymal hemorrhage within nabil with mass effect (+ acute/subacute right cerebellar infarct) in setting of hypertensive emergency, transferred to [facility] for further neurosurgical care. Hospital course c/b poor neurologic recovery s/p trach-PEG, AUR s/p gabriel, ANIKA on CKD c/b hyperkalemia, HAP s/p amp-sulbactam (EOT 10/23), K aerogenes bacteremia  treated with 2 weeks of Cefepime per ID , On 11/15 he became septic and was transferred to NSICU due to increased o2 requirements and needed Vent support , Trach Cultures + for Stenotrophomonas which was treated with 7 days of Bactrim per ID , has been weaned of Vent since 11/23 and is now on 10lts at 40% o2 through Trach Collar. Stepped up to the NSICU on 12/15 for hypoxia and tachycardia. Pt s/p  abx for 5 days. Pt now stepped down to 8[facility]ach.    # Pontine hemorrhage  # Encephalopathy due to Intracranial Bleed   - Acute parenchymal hemorrhage within nabil with mass effect (+ acute/subacute right cerebellar infarct) in setting of hypertensive emergency.   - MRI brain also demonstrated acute/subacute R cerebellar stroke.   - No Neurosurgical Interventions were performed   - Secondary to IPH and cerebellar stroke - Trach and PEG Dependent    # Seizures  -Pt with episode of facial and leg twitching on 2/16 which resolved after the pt was given ativan 4mg x1.   - Continue Seizure precautions   - Continue Keppra and phenytoin  -Will continue EEG monitoring and will f/u with Epilepsy team      # Hypertension  - Continue amlodipine 5mg daily with holding parameters  -Continue Propranolol with holding parameters     #Acute on Chronic respiratory failure likely 2/2 Pneumonia    - s/p percutaneous trach by pulm on 10/14/24  - Pt s/p trach Exchange by Pulmonary on 2/14.   -Pt's BAL on 2/13 growing stenotrophomonas and Pulmonary recommended starting levofloxacin or bactrim   - Continue vent support and chest PT  -ID following ; Will touch base with ID and repeat blood cx prior to starting abx     # Neurogenic dysphagia.   - Pt s/p PEG placement by  surgery 10/21/24  - Continue TF per nutrition  -Continue aspiration precautions and elevation of HOB.    # Acute urinary retention.   - doxazosin 8mg qHS   - monitor urine output    # Functional quadriplegia in setting of brainstem hemorrhage  - decub precautions  - care per nursing protocol     # CKD stage III   -Baseline Creatinine 1.1  -Will continue to monitor creatinine and avoid nephrotoxic agenets    # Hyperphosphatemia ( resolved)   -Pt's  phosphate 4.5  -Will continue to monitor for now if continues to uptrend then will consider starting sevelamer   -Will also discuss with nutrition about adjusting tube feeds     #Mild hyponatremia   -Pt's Na 133     #DVT prop  -Heparin SQ TID    55 minutes spent on total encounter. The necessity of the time spent during the encounter on this date of service was due to:    Review of hospital course, labs, vitals, medical records.  Bedside exam and interview of the patient  Discussed plan of care with primary team   Documenting the encounter.     44M with unclear PMH (?stroke, MI) who was found down at work, intubated for airway protection and found to have acute parenchymal hemorrhage within nabil with mass effect (+ acute/subacute right cerebellar infarct) in setting of hypertensive emergency, transferred to [facility] for further neurosurgical care. Hospital course c/b poor neurologic recovery s/p trach-PEG, AUR s/p gabriel, ANIKA on CKD c/b hyperkalemia, HAP s/p amp-sulbactam (EOT 10/23), K aerogenes bacteremia  treated with 2 weeks of Cefepime per ID , On 11/15 he became septic and was transferred to NSICU due to increased o2 requirements and needed Vent support , Trach Cultures + for Stenotrophomonas which was treated with 7 days of Bactrim per ID , has been weaned of Vent since 11/23 and is now on 10lts at 40% o2 through Trach Collar. Stepped up to the NSICU on 12/15 for hypoxia and tachycardia. Pt s/p  abx for 5 days. Pt now stepped down to 8[facility].    # Pontine hemorrhage  # Encephalopathy due to Intracranial Bleed   - Acute parenchymal hemorrhage within nabil with mass effect (+ acute/subacute right cerebellar infarct) in setting of hypertensive emergency.   - MRI brain also demonstrated acute/subacute R cerebellar stroke.   - No Neurosurgical Interventions were performed   - Secondary to IPH and cerebellar stroke - Trach and PEG Dependent    # Seizures  -Pt with episode of facial and leg twitching on 2/16 which resolved after the pt was given ativan 4mg x1.   - Continue Seizure precautions   - Continue Keppra and phenytoin  -Will continue EEG monitoring and will f/u with Epilepsy team      # Hypertension  - Continue amlodipine 5mg daily with holding parameters  -Continue Propranolol with holding parameters     #Acute on Chronic respiratory failure likely 2/2 Pneumonia    - s/p percutaneous trach by pulm on 10/14/24  - Pt s/p trach Exchange by Pulmonary on 2/14.   -Pt's BAL on 2/13 growing stenotrophomonas and Pulmonary recommended starting levofloxacin or bactrim   - Continue vent support and chest PT  -ID following ; Will touch base with ID about starting bactrim and repeat blood cx prior to starting abx     # Neurogenic dysphagia.   - Pt s/p PEG placement by  surgery 10/21/24  - Continue TF per nutrition  -Continue aspiration precautions and elevation of HOB.    # Acute urinary retention.   - doxazosin 8mg qHS   - monitor urine output    # Functional quadriplegia in setting of brainstem hemorrhage  - decub precautions  - care per nursing protocol     # CKD stage III   -Baseline Creatinine 1.1  -Will continue to monitor creatinine and avoid nephrotoxic agenets    # Hyperphosphatemia ( resolved)   -Pt's  phosphate 4.5  -Will continue to monitor for now if continues to uptrend then will consider starting sevelamer   -Will also discuss with nutrition about adjusting tube feeds     #Mild hyponatremia   -Pt's Na 133     #DVT prop  -Heparin SQ TID    55 minutes spent on total encounter. The necessity of the time spent during the encounter on this date of service was due to:    Review of hospital course, labs, vitals, medical records.  Bedside exam and interview of the patient  Discussed plan of care with primary team   Documenting the encounter.

## 2025-02-17 NOTE — PROGRESS NOTE ADULT - SUBJECTIVE AND OBJECTIVE BOX
INFECTIOUS DISEASES CONSULT FOLLOW-UP NOTE    INTERVAL HPI/OVERNIGHT EVENTS:      ROS:   Constitutional, eyes, ENT, cardiovascular, respiratory, gastrointestinal, genitourinary, integumentary, neurological, psychiatric and heme/lymph are otherwise negative other than noted above       ANTIBIOTICS/RELEVANT:    MEDICATIONS  (STANDING):  albuterol/ipratropium for Nebulization 3 milliLiter(s) Nebulizer every 4 hours  amLODIPine   Tablet 5 milliGRAM(s) Oral daily  artificial tears (preservative free) Ophthalmic Solution 1 Drop(s) Right EYE every 4 hours  baclofen 5 milliGRAM(s) Oral every 12 hours  chlorhexidine 0.12% Liquid 15 milliLiter(s) Oral Mucosa every 12 hours  chlorhexidine 2% Cloths 1 Application(s) Topical <User Schedule>  doxazosin 8 milliGRAM(s) Oral at bedtime  erythromycin   Ointment 1 Application(s) Both EYES four times a day  fluticasone propionate 50 MICROgram(s)/spray Nasal Spray 1 Spray(s) Both Nostrils two times a day  heparin   Injectable 5000 Unit(s) SubCutaneous every 8 hours  influenza   Vaccine 0.5 milliLiter(s) IntraMuscular once  levETIRAcetam 1500 milliGRAM(s) Oral every 12 hours  pantoprazole   Suspension 40 milliGRAM(s) Oral daily  petrolatum Ophthalmic Ointment 1 Application(s) Both EYES four times a day  phenytoin   Suspension 150 milliGRAM(s) Oral <User Schedule>  phenytoin   Suspension 200 milliGRAM(s) Oral <User Schedule>  phenytoin  IVPB 800 milliGRAM(s) IV Intermittent once  polyethylene glycol 3350 17 Gram(s) Oral daily  propranolol 5 milliGRAM(s) Oral every 12 hours  senna 2 Tablet(s) Oral at bedtime  sodium chloride 0.65% Nasal 1 Spray(s) Both Nostrils two times a day    MEDICATIONS  (PRN):  acetaminophen   Oral Liquid .. 650 milliGRAM(s) Oral every 6 hours PRN Temp greater or equal to 38C (100.4F), Mild Pain (1 - 3)  LORazepam   Injectable 2 milliGRAM(s) IV Push once PRN Seizure Activity (NOTIFY PROVIDER PRIOR TO GIVING)        Vital Signs Last 24 Hrs  T(C): 36.3 (17 Feb 2025 08:59), Max: 37.9 (16 Feb 2025 15:46)  T(F): 97.3 (17 Feb 2025 08:59), Max: 100.3 (16 Feb 2025 15:46)  HR: 68 (17 Feb 2025 13:05) (62 - 82)  BP: 108/77 (17 Feb 2025 12:30) (100/64 - 108/77)  BP(mean): 88 (17 Feb 2025 12:30) (79 - 88)  RR: 14 (17 Feb 2025 13:05) (14 - 18)  SpO2: 96% (17 Feb 2025 13:05) (94% - 99%)    Parameters below as of 17 Feb 2025 13:05  Patient On (Oxygen Delivery Method): ventilator    O2 Concentration (%): 40    02-16-25 @ 07:01  -  02-17-25 @ 07:00  --------------------------------------------------------  IN: 1403 mL / OUT: 1200 mL / NET: 203 mL    02-17-25 @ 07:01  -  02-17-25 @ 14:04  --------------------------------------------------------  IN: 178 mL / OUT: 175 mL / NET: 3 mL      PHYSICAL EXAM:  Constitutional: alert, NAD  Eyes: the sclera and conjunctiva were normal.   ENT: the ears and nose were normal in appearance.   Neck: the appearance of the neck was normal and the neck was supple.   Pulmonary: no respiratory distress and lungs were clear to auscultation bilaterally.   Heart: heart rate was normal and rhythm regular, normal S1 and S2  Vascular:. there was no peripheral edema  Abdomen: normal bowel sounds, soft, non-tender  Neurological: no focal deficits.   Psychiatric: the affect was normal        LABS:                        10.4   8.05  )-----------( 180      ( 17 Feb 2025 05:30 )             33.2     02-17    139  |  99  |  50[H]  ----------------------------<  110[H]  3.6   |  25  |  1.26    Ca    9.5      17 Feb 2025 12:00  Phos  4.5     02-17  Mg     2.2     02-17        Urinalysis Basic - ( 17 Feb 2025 12:00 )    Color: x / Appearance: x / SG: x / pH: x  Gluc: 110 mg/dL / Ketone: x  / Bili: x / Urobili: x   Blood: x / Protein: x / Nitrite: x   Leuk Esterase: x / RBC: x / WBC x   Sq Epi: x / Non Sq Epi: x / Bacteria: x        MICROBIOLOGY:      RADIOLOGY & ADDITIONAL STUDIES:  Reviewed INFECTIOUS DISEASES CONSULT FOLLOW-UP NOTE    INTERVAL HPI/OVERNIGHT EVENTS: GEORGE, requiring frequent suctioning, ROS limited by patient's mental status      ANTIBIOTICS/RELEVANT:    MEDICATIONS  (STANDING):  albuterol/ipratropium for Nebulization 3 milliLiter(s) Nebulizer every 4 hours  amLODIPine   Tablet 5 milliGRAM(s) Oral daily  artificial tears (preservative free) Ophthalmic Solution 1 Drop(s) Right EYE every 4 hours  baclofen 5 milliGRAM(s) Oral every 12 hours  chlorhexidine 0.12% Liquid 15 milliLiter(s) Oral Mucosa every 12 hours  chlorhexidine 2% Cloths 1 Application(s) Topical <User Schedule>  doxazosin 8 milliGRAM(s) Oral at bedtime  erythromycin   Ointment 1 Application(s) Both EYES four times a day  fluticasone propionate 50 MICROgram(s)/spray Nasal Spray 1 Spray(s) Both Nostrils two times a day  heparin   Injectable 5000 Unit(s) SubCutaneous every 8 hours  influenza   Vaccine 0.5 milliLiter(s) IntraMuscular once  levETIRAcetam 1500 milliGRAM(s) Oral every 12 hours  pantoprazole   Suspension 40 milliGRAM(s) Oral daily  petrolatum Ophthalmic Ointment 1 Application(s) Both EYES four times a day  phenytoin   Suspension 150 milliGRAM(s) Oral <User Schedule>  phenytoin   Suspension 200 milliGRAM(s) Oral <User Schedule>  phenytoin  IVPB 800 milliGRAM(s) IV Intermittent once  polyethylene glycol 3350 17 Gram(s) Oral daily  propranolol 5 milliGRAM(s) Oral every 12 hours  senna 2 Tablet(s) Oral at bedtime  sodium chloride 0.65% Nasal 1 Spray(s) Both Nostrils two times a day    MEDICATIONS  (PRN):  acetaminophen   Oral Liquid .. 650 milliGRAM(s) Oral every 6 hours PRN Temp greater or equal to 38C (100.4F), Mild Pain (1 - 3)  LORazepam   Injectable 2 milliGRAM(s) IV Push once PRN Seizure Activity (NOTIFY PROVIDER PRIOR TO GIVING)        Vital Signs Last 24 Hrs  T(C): 36.3 (17 Feb 2025 08:59), Max: 37.9 (16 Feb 2025 15:46)  T(F): 97.3 (17 Feb 2025 08:59), Max: 100.3 (16 Feb 2025 15:46)  HR: 68 (17 Feb 2025 13:05) (62 - 82)  BP: 108/77 (17 Feb 2025 12:30) (100/64 - 108/77)  BP(mean): 88 (17 Feb 2025 12:30) (79 - 88)  RR: 14 (17 Feb 2025 13:05) (14 - 18)  SpO2: 96% (17 Feb 2025 13:05) (94% - 99%)    Parameters below as of 17 Feb 2025 13:05  Patient On (Oxygen Delivery Method): ventilator    O2 Concentration (%): 40    02-16-25 @ 07:01  -  02-17-25 @ 07:00  --------------------------------------------------------  IN: 1403 mL / OUT: 1200 mL / NET: 203 mL    02-17-25 @ 07:01  -  02-17-25 @ 14:04  --------------------------------------------------------  IN: 178 mL / OUT: 175 mL / NET: 3 mL      PHYSICAL EXAM:  Constitutional: somnolent, NAD  Eyes: the sclera and conjunctiva were normal.   ENT: the ears and nose were normal in appearance.   Neck: the appearance of the neck was normal and the neck was supple. tracheostomy in place.  Pulmonary: no respiratory distress and lungs were clear to auscultation bilaterally.   Heart: heart rate was normal and rhythm regular, normal S1 and S2  Vascular:. there was no peripheral edema  Abdomen: normal bowel sounds, soft, non-tender. PEG tube in place.  Neurological: nonverbal, somnolent        LABS:                        10.4   8.05  )-----------( 180      ( 17 Feb 2025 05:30 )             33.2     02-17    139  |  99  |  50[H]  ----------------------------<  110[H]  3.6   |  25  |  1.26    Ca    9.5      17 Feb 2025 12:00  Phos  4.5     02-17  Mg     2.2     02-17        Urinalysis Basic - ( 17 Feb 2025 12:00 )    Color: x / Appearance: x / SG: x / pH: x  Gluc: 110 mg/dL / Ketone: x  / Bili: x / Urobili: x   Blood: x / Protein: x / Nitrite: x   Leuk Esterase: x / RBC: x / WBC x   Sq Epi: x / Non Sq Epi: x / Bacteria: x        MICROBIOLOGY:      RADIOLOGY & ADDITIONAL STUDIES:  Reviewed

## 2025-02-17 NOTE — PROGRESS NOTE ADULT - SUBJECTIVE AND OBJECTIVE BOX
Inteval history:    Patient yesterday an episode of moving head, arms and leg uncontrollably with desaturation. Primary team treated with Ativan 4 mg IV stopping symptoms. Placed patient on vEEG today. Today patient having twitching on the right side of his face. Primary team less concerned about this movements since patient present those on and off in the last weeks. Patient urine output low today and BUN/Cr trending up. Phe level 18.5 today.     ROS  As above, otherwise negative for constitutional/HEENT/CV/pulm/GI//MSK/neuro/derm/endocrine/psych.     MEDICATIONS  (STANDING):  albuterol/ipratropium for Nebulization 3 milliLiter(s) Nebulizer every 4 hours  amLODIPine   Tablet 5 milliGRAM(s) Oral daily  artificial tears (preservative free) Ophthalmic Solution 1 Drop(s) Right EYE every 4 hours  baclofen 5 milliGRAM(s) Oral every 12 hours  chlorhexidine 0.12% Liquid 15 milliLiter(s) Oral Mucosa every 12 hours  chlorhexidine 2% Cloths 1 Application(s) Topical <User Schedule>  doxazosin 8 milliGRAM(s) Oral at bedtime  erythromycin   Ointment 1 Application(s) Both EYES four times a day  fluticasone propionate 50 MICROgram(s)/spray Nasal Spray 1 Spray(s) Both Nostrils two times a day  heparin   Injectable 5000 Unit(s) SubCutaneous every 8 hours  influenza   Vaccine 0.5 milliLiter(s) IntraMuscular once  levETIRAcetam 1500 milliGRAM(s) Oral every 12 hours  LORazepam     Tablet 1 milliGRAM(s) Oral three times a day  minocycline 200 milliGRAM(s) Oral <User Schedule>  pantoprazole   Suspension 40 milliGRAM(s) Oral daily  petrolatum Ophthalmic Ointment 1 Application(s) Both EYES four times a day  phenytoin   Suspension 150 milliGRAM(s) Oral <User Schedule>  phenytoin   Suspension 200 milliGRAM(s) Oral <User Schedule>  polyethylene glycol 3350 17 Gram(s) Oral daily  propranolol 5 milliGRAM(s) Oral every 12 hours  senna 2 Tablet(s) Oral at bedtime  sodium chloride 0.65% Nasal 1 Spray(s) Both Nostrils two times a day  trimethoprim  160 mG/sulfamethoxazole 800 mG 2 Tablet(s) Oral every 8 hours    MEDICATIONS  (PRN):  acetaminophen   Oral Liquid .. 650 milliGRAM(s) Oral every 6 hours PRN Temp greater or equal to 38C (100.4F), Mild Pain (1 - 3)  LORazepam   Injectable 2 milliGRAM(s) IV Push once PRN Seizure Activity (NOTIFY PROVIDER PRIOR TO GIVING)      T(C): 36.7 (02-17-25 @ 14:27), Max: 37.9 (02-16-25 @ 15:46)  HR: 68 (02-17-25 @ 13:05) (62 - 82)  BP: 108/77 (02-17-25 @ 12:30) (100/64 - 108/77)  RR: 14 (02-17-25 @ 13:05) (14 - 18)  SpO2: 96% (02-17-25 @ 13:05) (94% - 99%)  Wt(kg): --    General:  Constitutional: On trach and ventilator  Mental status: Somnolent.  GCS 5t/15 ([id number]) opening and closing eyes to pain with some mild facial grimacing. Does not follow commands.  Brainstem reflexes: Left ptosis. No TTB. No corneal reflexes. Pupillary reflexes with sluggish myotic pupils 1 mm reactive to light. Oculocephalic present  Respiratory pattern: Overbreathing ventilator  Tone: Reduced R>L  Sensory-motor: Only grimacing to central and acral pain  Movement disorders: Facial irregular twitching localized to the R side of the face (myoclonic movements)  DTRs 1+ LE and 2+ UE. No Babinski    Investigations:  CBC Full  -  ( 17 Feb 2025 05:30 )  WBC Count : 8.05 K/uL  RBC Count : 3.44 M/uL  Hemoglobin : 10.4 g/dL  Hematocrit : 33.2 %  Platelet Count - Automated : 180 K/uL  Mean Cell Volume : 96.5 fl  Mean Cell Hemoglobin : 30.2 pg  Mean Cell Hemoglobin Concentration : 31.3 g/dL  Auto Neutrophil # : x  Auto Lymphocyte # : x  Auto Monocyte # : x  Auto Eosinophil # : x  Auto Basophil # : x  Auto Neutrophil % : x  Auto Lymphocyte % : x  Auto Monocyte % : x  Auto Eosinophil % : x  Auto Basophil % : x    02-17    139  |  99  |  50[H]  ----------------------------<  110[H]  3.6   |  25  |  1.26    Ca    9.5      17 Feb 2025 12:00  Phos  4.5     02-17  Mg     2.2     02-17    Phenytoin Level, Serum: 18.5 ug/mL (02-17 @ 12:57)      EEG 2/17:  Abnormal, due to the presence of:  1)	Electroclinical seizure, focal semiology, right anterior quadrant onset  2)	Electrographic seizures, right anterior quadrant onset  3)	Occasional periodic lateralizing epileptiform discharges (PLEDs), right anterior quadrant  4)	Poor level of organization  5)	Voltage attenuation, diffuse  6)	Generalized background slowing  The above mentioned findings are consistent with the presence of marked epileptogenicity in the right anterior quadrant.  In addition, severe diffuse cerebral dysfunction is present.  Both electroclinical as well as electrographic seizures with a right anterior quadrant onset were captured.

## 2025-02-18 LAB
ANION GAP SERPL CALC-SCNC: 10 MMOL/L — SIGNIFICANT CHANGE UP (ref 5–17)
BUN SERPL-MCNC: 50 MG/DL — HIGH (ref 7–23)
CALCIUM SERPL-MCNC: 9.6 MG/DL — SIGNIFICANT CHANGE UP (ref 8.4–10.5)
CHLORIDE SERPL-SCNC: 100 MMOL/L — SIGNIFICANT CHANGE UP (ref 96–108)
CO2 SERPL-SCNC: 26 MMOL/L — SIGNIFICANT CHANGE UP (ref 22–31)
CREAT SERPL-MCNC: 1.27 MG/DL — SIGNIFICANT CHANGE UP (ref 0.5–1.3)
EGFR: 71 ML/MIN/1.73M2 — SIGNIFICANT CHANGE UP
EGFR: 71 ML/MIN/1.73M2 — SIGNIFICANT CHANGE UP
GLUCOSE SERPL-MCNC: 102 MG/DL — HIGH (ref 70–99)
HCT VFR BLD CALC: 32.2 % — LOW (ref 39–50)
HGB BLD-MCNC: 10.2 G/DL — LOW (ref 13–17)
MAGNESIUM SERPL-MCNC: 2.3 MG/DL — SIGNIFICANT CHANGE UP (ref 1.6–2.6)
MCHC RBC-ENTMCNC: 30.1 PG — SIGNIFICANT CHANGE UP (ref 27–34)
MCHC RBC-ENTMCNC: 31.7 G/DL — LOW (ref 32–36)
MCV RBC AUTO: 95 FL — SIGNIFICANT CHANGE UP (ref 80–100)
NRBC BLD AUTO-RTO: 0 /100 WBCS — SIGNIFICANT CHANGE UP (ref 0–0)
PHOSPHATE SERPL-MCNC: 4.8 MG/DL — HIGH (ref 2.5–4.5)
PLATELET # BLD AUTO: 239 K/UL — SIGNIFICANT CHANGE UP (ref 150–400)
POTASSIUM SERPL-MCNC: 4.5 MMOL/L — SIGNIFICANT CHANGE UP (ref 3.5–5.3)
POTASSIUM SERPL-SCNC: 4.5 MMOL/L — SIGNIFICANT CHANGE UP (ref 3.5–5.3)
RBC # BLD: 3.39 M/UL — LOW (ref 4.2–5.8)
RBC # FLD: 12.5 % — SIGNIFICANT CHANGE UP (ref 10.3–14.5)
SODIUM SERPL-SCNC: 136 MMOL/L — SIGNIFICANT CHANGE UP (ref 135–145)
WBC # BLD: 6.3 K/UL — SIGNIFICANT CHANGE UP (ref 3.8–10.5)
WBC # FLD AUTO: 6.3 K/UL — SIGNIFICANT CHANGE UP (ref 3.8–10.5)

## 2025-02-18 PROCEDURE — 99232 SBSQ HOSP IP/OBS MODERATE 35: CPT | Mod: GC

## 2025-02-18 PROCEDURE — 99232 SBSQ HOSP IP/OBS MODERATE 35: CPT

## 2025-02-18 PROCEDURE — G0545: CPT

## 2025-02-18 RX ORDER — LORAZEPAM 4 MG/ML
1 VIAL (ML) INJECTION EVERY 12 HOURS
Refills: 0 | Status: DISCONTINUED | OUTPATIENT
Start: 2025-02-19 | End: 2025-02-26

## 2025-02-18 RX ADMIN — Medication 200 MILLIGRAM(S): at 17:31

## 2025-02-18 RX ADMIN — Medication 1 SPRAY(S): at 17:30

## 2025-02-18 RX ADMIN — IPRATROPIUM BROMIDE AND ALBUTEROL SULFATE 3 MILLILITER(S): .5; 2.5 SOLUTION RESPIRATORY (INHALATION) at 17:30

## 2025-02-18 RX ADMIN — FLUTICASONE PROPIONATE 1 SPRAY(S): 50 SPRAY, METERED NASAL at 05:43

## 2025-02-18 RX ADMIN — IPRATROPIUM BROMIDE AND ALBUTEROL SULFATE 3 MILLILITER(S): .5; 2.5 SOLUTION RESPIRATORY (INHALATION) at 01:03

## 2025-02-18 RX ADMIN — Medication 1 APPLICATION(S): at 11:35

## 2025-02-18 RX ADMIN — Medication 150 MILLIGRAM(S): at 11:37

## 2025-02-18 RX ADMIN — ERYTHROMYCIN 1 APPLICATION(S): 5 OINTMENT OPHTHALMIC at 05:43

## 2025-02-18 RX ADMIN — Medication 15 MILLILITER(S): at 17:30

## 2025-02-18 RX ADMIN — Medication 1 DROP(S): at 01:03

## 2025-02-18 RX ADMIN — Medication 200 MILLIGRAM(S): at 05:42

## 2025-02-18 RX ADMIN — Medication 1 APPLICATION(S): at 00:50

## 2025-02-18 RX ADMIN — Medication 2 TABLET(S): at 22:03

## 2025-02-18 RX ADMIN — Medication 40 MILLIGRAM(S): at 11:34

## 2025-02-18 RX ADMIN — IPRATROPIUM BROMIDE AND ALBUTEROL SULFATE 3 MILLILITER(S): .5; 2.5 SOLUTION RESPIRATORY (INHALATION) at 05:42

## 2025-02-18 RX ADMIN — ERYTHROMYCIN 1 APPLICATION(S): 5 OINTMENT OPHTHALMIC at 17:29

## 2025-02-18 RX ADMIN — Medication 15 MILLILITER(S): at 05:42

## 2025-02-18 RX ADMIN — HEPARIN SODIUM 5000 UNIT(S): 1000 INJECTION INTRAVENOUS; SUBCUTANEOUS at 22:03

## 2025-02-18 RX ADMIN — LEVETIRACETAM 1500 MILLIGRAM(S): 10 INJECTION, SOLUTION INTRAVENOUS at 17:31

## 2025-02-18 RX ADMIN — Medication 1 DROP(S): at 05:41

## 2025-02-18 RX ADMIN — FLUTICASONE PROPIONATE 1 SPRAY(S): 50 SPRAY, METERED NASAL at 17:30

## 2025-02-18 RX ADMIN — Medication 2 TABLET(S): at 05:41

## 2025-02-18 RX ADMIN — Medication 1 DROP(S): at 22:01

## 2025-02-18 RX ADMIN — Medication 1 DROP(S): at 10:30

## 2025-02-18 RX ADMIN — Medication 2 TABLET(S): at 13:42

## 2025-02-18 RX ADMIN — Medication 1 DROP(S): at 13:42

## 2025-02-18 RX ADMIN — LEVETIRACETAM 1500 MILLIGRAM(S): 10 INJECTION, SOLUTION INTRAVENOUS at 05:41

## 2025-02-18 RX ADMIN — Medication 1 APPLICATION(S): at 17:29

## 2025-02-18 RX ADMIN — IPRATROPIUM BROMIDE AND ALBUTEROL SULFATE 3 MILLILITER(S): .5; 2.5 SOLUTION RESPIRATORY (INHALATION) at 13:42

## 2025-02-18 RX ADMIN — POLYETHYLENE GLYCOL 3350 17 GRAM(S): 17 POWDER, FOR SOLUTION ORAL at 11:34

## 2025-02-18 RX ADMIN — IPRATROPIUM BROMIDE AND ALBUTEROL SULFATE 3 MILLILITER(S): .5; 2.5 SOLUTION RESPIRATORY (INHALATION) at 10:29

## 2025-02-18 RX ADMIN — Medication 1 DROP(S): at 17:30

## 2025-02-18 RX ADMIN — Medication 200 MILLIGRAM(S): at 19:14

## 2025-02-18 RX ADMIN — ERYTHROMYCIN 1 APPLICATION(S): 5 OINTMENT OPHTHALMIC at 23:29

## 2025-02-18 RX ADMIN — IPRATROPIUM BROMIDE AND ALBUTEROL SULFATE 3 MILLILITER(S): .5; 2.5 SOLUTION RESPIRATORY (INHALATION) at 22:02

## 2025-02-18 RX ADMIN — Medication 1 MILLIGRAM(S): at 13:42

## 2025-02-18 RX ADMIN — Medication 1 MILLIGRAM(S): at 05:42

## 2025-02-18 RX ADMIN — HEPARIN SODIUM 5000 UNIT(S): 1000 INJECTION INTRAVENOUS; SUBCUTANEOUS at 05:42

## 2025-02-18 RX ADMIN — ERYTHROMYCIN 1 APPLICATION(S): 5 OINTMENT OPHTHALMIC at 11:34

## 2025-02-18 RX ADMIN — BACLOFEN 5 MILLIGRAM(S): 10 INJECTION INTRATHECAL at 17:31

## 2025-02-18 RX ADMIN — Medication 1 APPLICATION(S): at 23:27

## 2025-02-18 RX ADMIN — Medication 1 SPRAY(S): at 05:44

## 2025-02-18 RX ADMIN — AMLODIPINE BESYLATE 5 MILLIGRAM(S): 10 TABLET ORAL at 05:54

## 2025-02-18 RX ADMIN — Medication 150 MILLIGRAM(S): at 03:08

## 2025-02-18 RX ADMIN — DOXAZOSIN MESYLATE 8 MILLIGRAM(S): 8 TABLET ORAL at 22:03

## 2025-02-18 RX ADMIN — HEPARIN SODIUM 5000 UNIT(S): 1000 INJECTION INTRAVENOUS; SUBCUTANEOUS at 13:42

## 2025-02-18 RX ADMIN — Medication 1 APPLICATION(S): at 06:06

## 2025-02-18 RX ADMIN — BACLOFEN 5 MILLIGRAM(S): 10 INJECTION INTRATHECAL at 05:42

## 2025-02-18 NOTE — PROGRESS NOTE ADULT - ASSESSMENT
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    Acute hypoxic respiratory failure  Brain stem stroke  Acute encephalopathy    Continues to be ventilator dependent. He still has considerable secretions, not able to clear on his own, and is being frequently suctioned.   S/p bronchoscopy with tracheostomy exchange, size 7.0 portex cuffed exchanged for size 7.0 portex cuffed    Called back for intermittent hypoxemia. he continues to have mucopurulent secretions with bronch cultures growing stenotrophomonas. He has a dense LLL consolidation on US as well as new LLL opacification on CXR. Clinically and radiographically he has pneumonia and should be treated as much with abx therapy targeting his steno, agree with bactrim.     - Not a candidate for downsizing or decannulation at this time as patient still requires ventilator support  - Wean FiO2 as tolerated  - Frequent suctioning and airway clearance; avoid mucomyst as this can be very bronchospastic   - abx to target steno; sensitive to bactrim, levaquin and minocycline, currently on bactrim    Pulmonary will sign off at this time.   Please reach out with any tracheostomy care questions or concerns, will need another trach exchange in 04/2025.   If there is a change in mental status, and he is able to tolerate trach collar, clear secretions, we can reassess for decannulation sooner.        Patient was seen, evaluated and discussed with PCCM attending.  Please page pulmonary if there are any questions with above recommendations.

## 2025-02-18 NOTE — PROGRESS NOTE ADULT - SUBJECTIVE AND OBJECTIVE BOX
PULMONARY CONSULT SERVICE FOLLOW-UP NOTE    INTERVAL HPI:  Reviewed chart and overnight events; patient seen and examined at bedside.    MEDICATIONS:  Pulmonary:  albuterol/ipratropium for Nebulization 3 milliLiter(s) Nebulizer every 4 hours    Antimicrobials:  minocycline 200 milliGRAM(s) Oral <User Schedule>  trimethoprim  160 mG/sulfamethoxazole 800 mG 2 Tablet(s) Oral every 8 hours    Anticoagulants:  heparin   Injectable 5000 Unit(s) SubCutaneous every 8 hours    Cardiac:  amLODIPine   Tablet 5 milliGRAM(s) Oral daily  doxazosin 8 milliGRAM(s) Oral at bedtime  propranolol 5 milliGRAM(s) Oral every 12 hours      Allergies    Allergy Status Unknown    Intolerances        Vital Signs Last 24 Hrs  T(C): 36.2 (18 Feb 2025 08:51), Max: 36.9 (17 Feb 2025 22:34)  T(F): 97.2 (18 Feb 2025 08:51), Max: 98.4 (17 Feb 2025 22:34)  HR: 74 (18 Feb 2025 13:00) (62 - 82)  BP: 132/91 (18 Feb 2025 13:00) (100/69 - 132/91)  BP(mean): 107 (18 Feb 2025 13:00) (79 - 107)  RR: 19 (18 Feb 2025 13:00) (14 - 19)  SpO2: 99% (18 Feb 2025 13:00) (95% - 100%)    Parameters below as of 18 Feb 2025 13:00  Patient On (Oxygen Delivery Method): ventilator    O2 Concentration (%): 40    02-17 @ 07:01 - 02-18 @ 07:00  --------------------------------------------------------  IN: 1348 mL / OUT: 975 mL / NET: 373 mL    02-18 @ 07:01  -  02-18 @ 14:41  --------------------------------------------------------  IN: 272 mL / OUT: 475 mL / NET: -203 mL      Mode: V500#34  RR (machine): 14  TV (machine): 400  FiO2: 40  PEEP: 5  ITime: 1  MAP: 8  PIP: 17      PHYSICAL EXAM:  Constitutional: NAD  HEENT: NC/AT; PERRL, anicteric sclera; MMM  Neck: supple  Cardiovascular: +S1/S2, RRR  Respiratory: CTA B/L; no W/R/R  Gastrointestinal: soft, NT/ND  Extremities: WWP; no edema, clubbing or cyanosis  Vascular: 2+ radial pulses B/L  Neurological: awake and alert; BURROUGHS    LABS:      CBC Full  -  ( 18 Feb 2025 05:30 )  WBC Count : 6.30 K/uL  RBC Count : 3.39 M/uL  Hemoglobin : 10.2 g/dL  Hematocrit : 32.2 %  Platelet Count - Automated : 239 K/uL  Mean Cell Volume : 95.0 fl  Mean Cell Hemoglobin : 30.1 pg  Mean Cell Hemoglobin Concentration : 31.7 g/dL  Auto Neutrophil # : x  Auto Lymphocyte # : x  Auto Monocyte # : x  Auto Eosinophil # : x  Auto Basophil # : x  Auto Neutrophil % : x  Auto Lymphocyte % : x  Auto Monocyte % : x  Auto Eosinophil % : x  Auto Basophil % : x    02-18    136  |  100  |  50[H]  ----------------------------<  102[H]  4.5   |  26  |  1.27    Ca    9.6      18 Feb 2025 05:30  Phos  4.8     02-18  Mg     2.3     02-18            Urinalysis Basic - ( 18 Feb 2025 05:30 )    Color: x / Appearance: x / SG: x / pH: x  Gluc: 102 mg/dL / Ketone: x  / Bili: x / Urobili: x   Blood: x / Protein: x / Nitrite: x   Leuk Esterase: x / RBC: x / WBC x   Sq Epi: x / Non Sq Epi: x / Bacteria: x                RADIOLOGY & ADDITIONAL STUDIES:

## 2025-02-18 NOTE — EEG REPORT - NS EEG TEXT BOX
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  Prolonged video EEG obtained to rule out subclinical seizures.    Day 2:  From 2025 at 07 AM to 2025 at 07:00:00 AM  Meds:Phenytoin 150/150/200 mg, Keppra 1500 mg bid, Ativan 1 mg tid  Background: Mixture of poorly organized very low voltage delta frequencies.  Generalized Slowing: Severe  Symmetry:  No persistent asymmetries of voltage or frequency.  Posterior Dominant Rhythm:  A sustained posterior dominant rhythm was not present.  Organization: Poorly organized  Voltage:  Low voltage.  Variability: Yes. 		Reactivity: Yes.  N2 sleep: Rudimentary likely N2 transient  Spontaneous Activity:  No epileptiform discharges.  Periodic/rhythmic activity:  None  Events:    Several right anterior quadrant  runs of 2-6 Hz too spiky wave without evolution in morphology correlate with right chin twitching and at the same times some of them associated with suctioning or drawing blood which raise concern for artifact rather than being definite seizures    Provocations:  Hyperventilation and Photic stimulation were not performed.    Daily Summary:    Abnormal, due to the presence of:  1-	Several right anterior quadrant run of spiky waves without evolution in morphology correlate with  right chin twitching and at the same times some of them associated with suctioning or drawing blood which raise concern for artifact rather than being definite seizures  2-	Severe generalized slowing indicating similar degree of diffuse/multifocal cerebral dysfunction    Bessie Martinez MD  CNP Fellow

## 2025-02-18 NOTE — PROGRESS NOTE ADULT - SUBJECTIVE AND OBJECTIVE BOX
Inteval history: No events overnight. Patient still presenting twitching in right face. Creatinine lateral today. Movements persistent but his baseline on 2/18, neither family nor primary team concern since he does have this movements on and off in the last weeks.    ROS  As above, otherwise negative for constitutional/HEENT/CV/pulm/GI//MSK/neuro/derm/endocrine/psych.     MEDICATIONS  (STANDING):  albuterol/ipratropium for Nebulization 3 milliLiter(s) Nebulizer every 4 hours  amLODIPine   Tablet 5 milliGRAM(s) Oral daily  artificial tears (preservative free) Ophthalmic Solution 1 Drop(s) Right EYE every 4 hours  baclofen 5 milliGRAM(s) Oral every 12 hours  chlorhexidine 0.12% Liquid 15 milliLiter(s) Oral Mucosa every 12 hours  doxazosin 8 milliGRAM(s) Oral at bedtime  erythromycin   Ointment 1 Application(s) Both EYES four times a day  fluticasone propionate 50 MICROgram(s)/spray Nasal Spray 1 Spray(s) Both Nostrils two times a day  heparin   Injectable 5000 Unit(s) SubCutaneous every 8 hours  influenza   Vaccine 0.5 milliLiter(s) IntraMuscular once  levETIRAcetam 1500 milliGRAM(s) Oral every 12 hours  LORazepam     Tablet 1 milliGRAM(s) Oral three times a day  minocycline 200 milliGRAM(s) Oral <User Schedule>  pantoprazole   Suspension 40 milliGRAM(s) Oral daily  petrolatum Ophthalmic Ointment 1 Application(s) Both EYES four times a day  phenytoin   Suspension 150 milliGRAM(s) Oral <User Schedule>  phenytoin   Suspension 200 milliGRAM(s) Oral <User Schedule>  polyethylene glycol 3350 17 Gram(s) Oral daily  propranolol 5 milliGRAM(s) Oral every 12 hours  senna 2 Tablet(s) Oral at bedtime  sodium chloride 0.65% Nasal 1 Spray(s) Both Nostrils two times a day  trimethoprim  160 mG/sulfamethoxazole 800 mG 2 Tablet(s) Oral every 8 hours    MEDICATIONS  (PRN):  acetaminophen   Oral Liquid .. 650 milliGRAM(s) Oral every 6 hours PRN Temp greater or equal to 38C (100.4F), Mild Pain (1 - 3)  LORazepam   Injectable 2 milliGRAM(s) IV Push once PRN Seizure Activity (NOTIFY PROVIDER PRIOR TO GIVING)      T(C): 36.2 (02-18-25 @ 08:51), Max: 36.9 (02-17-25 @ 22:34)  HR: 62 (02-18-25 @ 10:45) (62 - 82)  BP: 114/81 (02-18-25 @ 08:38) (100/69 - 114/81)  RR: 14 (02-18-25 @ 08:38) (14 - 15)  SpO2: 96% (02-18-25 @ 10:45) (95% - 100%)  Wt(kg): --    General:  Constitutional:  Sitting comfortably in NAD.  Ears, Nose, Throat: no abnormalities, mucus membranes moist  Neck: supple, no lymphadenopathy  Extremities: no edema, clubbing or cyanosis  Skin: no rash or neurocutaneous signs     Constitutional: On trach and ventilator  Mental status: Somnolent.  GCS 4t/15 ([id number]) opening and closing eyes to pain. Does not follow commands.  Brainstem reflexes: Left ptosis. No TTB. No corneal reflexes. Pupillary reflexes with sluggish myotic pupils 1 mm reactive to light. Oculocephalic present  Respiratory pattern: No overbreathing ventilator  Tone: Reduced R>L  Sensory-motor: Only opening eyes to central and acral pain  Movement disorders: Facial irregular twitching localized to the R side of the face (myoclonic movements)  DTRs 1+ LE and 2+ UE. No Babinski    Investigations:  CBC Full  -  ( 18 Feb 2025 05:30 )  WBC Count : 6.30 K/uL  RBC Count : 3.39 M/uL  Hemoglobin : 10.2 g/dL  Hematocrit : 32.2 %  Platelet Count - Automated : 239 K/uL  Mean Cell Volume : 95.0 fl  Mean Cell Hemoglobin : 30.1 pg  Mean Cell Hemoglobin Concentration : 31.7 g/dL  Auto Neutrophil # : x  Auto Lymphocyte # : x  Auto Monocyte # : x  Auto Eosinophil # : x  Auto Basophil # : x  Auto Neutrophil % : x  Auto Lymphocyte % : x  Auto Monocyte % : x  Auto Eosinophil % : x  Auto Basophil % : x    02-18    136  |  100  |  50[H]  ----------------------------<  102[H]  4.5   |  26  |  1.27    Ca    9.6      18 Feb 2025 05:30  Phos  4.8     02-18  Mg     2.3     02-18          Phenytoin Level, Serum: 18.5 ug/mL (02-17 @ 12:57)      EEG 2/17:  Abnormal, due to the presence of:  1)	Electroclinical seizure, focal semiology, right anterior quadrant onset  2)	Electrographic seizures, right anterior quadrant onset  3)	Occasional periodic lateralizing epileptiform discharges (PLEDs), right anterior quadrant  4)	Poor level of organization  5)	Voltage attenuation, diffuse  6)	Generalized background slowing  The above mentioned findings are consistent with the presence of marked epileptogenicity in the right anterior quadrant.  In addition, severe diffuse cerebral dysfunction is present.  Both electroclinical as well as electrographic seizures with a right anterior quadrant onset were captured.

## 2025-02-18 NOTE — PROGRESS NOTE ADULT - SUBJECTIVE AND OBJECTIVE BOX
HPI:  Unknown age male, unknown past medical history (reported stroke and MI by coworkers) presented to [facility] with AMS, Pt was working at [employer] when he was found down by coworkers. EMS called and pt brought to [facility] ED. Intubated, sedated, started on cardene for SBPs in 200s. CT head showed brain stem bleed. Transferred to NSICU for further management.  (30 Sep 2024 12:55)    INTERVAL EVENTS: Blood cultures drawn. ID rec'd treatment for stenotrophomonas. EEG with concern for singular seizure activity. Keppra and Phenytoin levels were drawn. 2x blood cultures sent prior to starting DS Bactrim and Minocycline PO as per ID recs. Additional standing ativan added per epilepsy.   2/18: GEORGE ovn    Hospital course:   12/1: BD 62, GEORGE overnight  12/2: BD 63, GEORGE overnight.; Given 1L bolus of LR for uptrending BUN/Cr.  12/3: BD 64. Reinstated eye gtt/moisture chamber given increased Rt eye injection  12/4: BD 65. GEORGE overnight. Attempted to speak with ophtho regarding eyelid weight/closure but no answer, full mailbox.   12/5: BD 66, GEORGE overnight, bowel regimen increased and had BM.   12/6: BD 67, GEORGE overnight, neuro stable.  12/7: GEORGE overnight, neuro stable. /110s, given x1 hydralazine 10 mg IVP. Restarted home amlodipine 5mg.  12/8: GEORGE. OOB to chair.     12/11: GEORGE, mucomyst added for thick secretions, simethicone for abd distension, abd xray with stool burden, increased bowel regimen.   12/12: GEORGE overnight.   12/13: GEORGE overnight.   12/14: GEORGE overnight  12/15: o/n Patient became tachycardic HR 120s and 10 minutes later O2 sat dropped as low as 89%. Patient suctioned without improvement in O2 sat and tube feeds found in suction catheter. TFs held.  STAT CXR ordered. STAT labs sent. Respiratory therapist called to bedside and patient trach connected to ventilator. After connection to ventilator and further suctioning O2 sat improved to 97% but patient HR remains 120-130s. Upgraded to NSICU for further management. Vancomycin and zosyn started. CTA  chest PE protocol and CTH ordered. Blood cultures sent.given 500cc bolus, rpt ABG sent pO2 243, CTH and CTA chest done. FS while NPO, FiO2 dec 50 pending ABG. sputum cx positive for few GPC and GNR.   12/16: GEORGE overnight, restarted amlodipine, troponin 75   12/17: GEORGE overnight, neuro stable. Attempted CPAP this morning, did not tolerate and back on full vent support. Dc'd Vanc. Tachycardia to 140s, noted extremities to be twitching along with jaw twitching. Given 2g of Keppra, total 4mg ativan and placed on EEG, full set of labs and lactate negative. Resumed trickle feeds at 20cc/hr. Given 250cc albumin for tachycardia.   12/18: GEORGE overnight. Neuro stable. CTH stable. EEG negative and dc'd.   12/19: GEORGE overnight. neuro stable. SIMV most of day, AC/VC at night.   12/20: GEORGE overnight, neuro stable. remains on VC/AC  12/21: GEORGE ovn. 1L bolus for tachy to 120s-130s.   12/22: GEORGE ovn. Tachy to 120s, given tylenol and IVF. 2mg IV ativan given for L jaw twitching. Sx resolved for 3 minutes and started again. Epilepsy contacted. Given 2mg IV ativan. Continued twitching. Increased keppra to 1500mg BID, 2mg ativan given. Propranolol started for refractory tachycardia. vEEG ordered. albumin given. POCUS performed, no b lines. Started on fosphenytoin, loaded w 20mg/kg then 100mg TID. febrile, pancx, started cefepime 2g q8, vancomycin  12/23: GEORGE ovn. +focal motor seizures on eeg. ID consulted. Vancomycin dc'ed as per ID.  12/24: GEORGE ovn, neuro stable. Baclofen 5 mg q12 started for hiccups. Na 129 from 134. Urine lytes c/w SIADH. Given 250cc 3% bolus. CT chest for infection w/u - f/u read. Repeat Na 136. UA negative  12/25: GEORGE ovn.   12/26: GEORGE ovn  12/27: GEORGE overnight. Blood cx neg @ 4 days, DC cefepime per medicine (sputum colonized).   12/28: Desaturation o/n to 80's, CXR obtained, pulse ox changed and sats resolved. Na 133 from 138, 250cc 3% bolus given. Cefepime resumed until 12/30 per ID. Rpt Na 138.  12/29: GEORGE overnight. Na stable.   12/30: GEORGE overnight. Family meeting with son, pt now DNR.   12/31: GEORGE overnight.   1/1: GEORGE overnight, neuro stable.  1/2: GEORGE overnight, neuro stable. FW decreased to 100q6.   1/3: GEORGE overnight, neuro stable. fosphenytoin IV changed to pheytoin PO via PEG per epilepsy recommendations  1/4: GEORGE overnight, neuro stable. FW incr. to 100q4  1/5: GEORGE overnight, neuro stable.   1/6: GEORGE overnight, neuro stable   1/7: GEORGE overnight, neuro stable. BUN/Cr increased, increased FW to 200q6. Given 1L bolus of LR over 2 hours. Vuong d/c'd, voiding, continue bladder scan q6.   1/8: GEORGE overnight, neuro stable. BUN/Cr improving.  1/9: GEORGE overnight, neuro stable.   1/10: GEORGE overnight, neuro stable.   1/11: GEORGE overnight, neuro stable, vent settings stable.   1/12: GEORGE overnight, neuro stable. Febrile to 102F, f/u pan cx, chest xray, given tylenol. Zosyn started.   1/13: GEORGE overnight, neuro stable, cont Zosyn for presumed PNA, prelim sputum cx growing; few GS neg diplococci, mod GS neg rods, rare GS + rods, fever trend improved. Free water increased to [id number].   1/14: GEORGE overnight. pending renal US for elevated Cr  1/15: GEORGE ovn. renal US complete.   1/16: GEORGE overnight, neuro stable   1/17: GEORGE overnight, neuro stable   1/18: GEORGE overnight, neuro stable.   1/19: GEORGE overnight, neuro stable. Propranolol dec to 5q8.   1/20: GEORGE overnight, neuro stable. Weaned propranolol to 5mg BID.   1/21: GEORGE ovn, in AM having rapid vertical eye movements with facial twitching, 2g total keppra given for AM dose and phenytoin level ordered, vital signs stable. CTH stable. Phenytoin increased to 100/100/150mg per epilepsy  1/22: GEORGE ovn. IV hydral x 1 for SBP 170s.   1/23: Cont to have focal motor seizures. Per epilepsy, changed phenytoin dose to 100/100/200.   1/24: Phenytoin lvl tmrw AM. Chest Xray completed due to temp 100.2F  1/25: GEORGE overnight. Incr dilantin morning and afternoon per epilepsy.   1/26: GEORGE overnight. Sustained focal twitching noticed by nursing. Ativan 8mg in total given. Fosphenytoin 1500mg IV given. Placed on EEG. Epilepsy following. TFs held. Phenytoin increased to 150/150/200.   1/27: o/n CTH completed due to continued lethargy after ativan given yesterday afternoon. TFs resumed. 500cc bolus NS given for SBP 90s. EEG dc'ed, discussed w/ Dr. Tomlin. Febrile 101F, pan cx sent. Likely left sided infiltrate on CXR, c/f aspiration PNA. Started on vanc/zosyn. MRSA swab pending.   1/28: Neuro stable, on vanc/zosyn. +UTI on UA, MRSA negative. Vanc dc'd. RVP negative. Zosyn increased to pseudomonal dosing.   1/29: GEORGE overnight, neuro stable.  1/30: GEORGE overnight, neuro stable. Dc'd zosyn due to resistance, switched to Levaquin.  1/31: GEORGE ovenight, neuro stable.   2/1: Brief desat. Improved with neb and reposititioning. ABG and POCUS wnl.   2/4: GEORGE overnight  2/5: GEORGE overnight  2/6: GEORGE ovn  2/7: GEORGE ovn. L eye redness noted, started erythromycin and lacrilube to L eye as well. LR @ 100 cc/hr x 10 hours for uptrending BUN/Cr. Resumed bowel reg.   2/8: GEORGE overnight. Escalated bowel regimen.   2/9: GEORGE overnight.  2/10: GEORGE overnight. Social work to start working on SNF placement. Pending appointment with [employer] [place] security to come to hospital for biometrics.  2/11: GEORGE overnight. Neuro stable. Potassium improved (downtrending).   2/12: GEORGE overnight. Neuro stable. Lokelma 5mg x 1. Decrease FW to 200q8. 1L NS bolus given for hydration, uptrending BUN/Cr. Wound care rec barrier wipes for penile wound.  TFs changed to Inofile renal formula per nutrition.  2/13: GEORGE overnight. Neuro stable. Corneal abrasion noted on exam, ophtho re-consulted for L eye, eye drops changed per recs to q4. Pulm contacted for trach exchange, attempted at bedside but patient unable to tolerate with minimal sedation, will plan for tomorrow.   2/14: GEORGE overnight, given 1L of NS for low BP after fentanyl with improvement. Neuro exam stable, pend trach exchange today with pulm. Pend optho assessment for left eye. Trache exchanged. Desaturation during placement to 90%. FiO2 incr'd 55%. CXR negative for pneumothorax.   2/15: GEORGE overnight. Dest'd 88%: suctione, incr'd FiO2, PEEP. CXR/ABG/POCUS done with B-lines, 20mg IV lasix given.   2/16: GEORGE ovn, BAL from 2/13 during trach exchange growing moderate stenotrophomonas maltophilia, desat to 87% red rubber suctioning performed with improvements in O2 sat to 98%, Desturated again to 76%. Deep suctioned, ABG/CXR and lasix, incr. vent settings, Saturating 95%. Pulm rec'd dc mucomyst. Increased clonus while having another episode of desaturation, given 4mg ativan, clonus broke. O2 sats normalized with deep suction. Back on vEEG per gen neuro. CT chest and sputum cx obtained per ID.   2/17: GEORGE ovn. Blood cultures drawn. ID rec'd treatment for stenotrophomonas. EEG with concern for singular seizure activity. Keppra and Phenytoin levels were drawn. 2x blood cultures sent prior to starting DS Bactrim and Minocycline PO as per ID recs. Additional standing ativan added per epilepsy.   2/18: GEORGE ovn    Vital Signs Last 24 Hrs  T(C): 36.9 (17 Feb 2025 22:34), Max: 36.9 (17 Feb 2025 22:34)  T(F): 98.4 (17 Feb 2025 22:34), Max: 98.4 (17 Feb 2025 22:34)  HR: 72 (17 Feb 2025 23:42) (62 - 82)  BP: 104/64 (17 Feb 2025 23:42) (103/64 - 112/79)  BP(mean): 80 (17 Feb 2025 23:42) (79 - 92)  RR: 14 (17 Feb 2025 23:42) (14 - 15)  SpO2: 100% (17 Feb 2025 23:42) (94% - 100%)    Parameters below as of 17 Feb 2025 23:42  Patient On (Oxygen Delivery Method): ventilator    O2 Concentration (%): 40    I&O's Summary    16 Feb 2025 07:01  -  17 Feb 2025 07:00  --------------------------------------------------------  IN: 1403 mL / OUT: 1200 mL / NET: 203 mL    17 Feb 2025 07:01  -  18 Feb 2025 00:12  --------------------------------------------------------  IN: 1212 mL / OUT: 775 mL / NET: 437 mL        PHYSICAL EXAM:  Constitutional: NAD, lying in bed.  HEENT: Pupils equal, round, reactive to light. +L eye with moisture chamber  Respiratory: +Trach to mechanical vent. No respiratory distress, symmetric chest rise  Cardiovascular: Regular rate and rhythm    Gastrointestinal: +PEG, Abdomen soft, nondistended.  Neurological:  AAOX3 to squeezing of R hand. Opens eyes. Tracks vertically  Motor: RUE squeezes hand to command HG 2/5, LUE 0/5, B/l LE w/d.   Sensation: unable to assess  Extremities: Warm, well perfused.    TUBES/LINES:  [] Vuong  [] A-line  [] Lumbar Drain  [] Wound Drains  [] NGT   [] EVD   [] CVC  [] Other      DIET:  [] NPO  [x] Mechanical  [] Tube feeds    LABS:                        10.4   8.05  )-----------( 180      ( 17 Feb 2025 05:30 )             33.2     02-17    139  |  99  |  50[H]  ----------------------------<  110[H]  3.6   |  25  |  1.26    Ca    9.5      17 Feb 2025 12:00  Phos  4.5     02-17  Mg     2.2     02-17        Urinalysis Basic - ( 17 Feb 2025 12:00 )    Color: x / Appearance: x / SG: x / pH: x  Gluc: 110 mg/dL / Ketone: x  / Bili: x / Urobili: x   Blood: x / Protein: x / Nitrite: x   Leuk Esterase: x / RBC: x / WBC x   Sq Epi: x / Non Sq Epi: x / Bacteria: x          CAPILLARY BLOOD GLUCOSE          Drug Levels: [] N/A  Phenytoin Level, Serum: 18.5 ug/mL (02-17 @ 12:57)    CSF Analysis: [] N/A      Allergies    Allergy Status Unknown    Intolerances        Home Medications:      MEDICATIONS:  MEDICATIONS  (STANDING):  albuterol/ipratropium for Nebulization 3 milliLiter(s) Nebulizer every 4 hours  amLODIPine   Tablet 5 milliGRAM(s) Oral daily  artificial tears (preservative free) Ophthalmic Solution 1 Drop(s) Right EYE every 4 hours  baclofen 5 milliGRAM(s) Oral every 12 hours  chlorhexidine 0.12% Liquid 15 milliLiter(s) Oral Mucosa every 12 hours  chlorhexidine 2% Cloths 1 Application(s) Topical <User Schedule>  doxazosin 8 milliGRAM(s) Oral at bedtime  erythromycin   Ointment 1 Application(s) Both EYES four times a day  fluticasone propionate 50 MICROgram(s)/spray Nasal Spray 1 Spray(s) Both Nostrils two times a day  heparin   Injectable 5000 Unit(s) SubCutaneous every 8 hours  influenza   Vaccine 0.5 milliLiter(s) IntraMuscular once  levETIRAcetam 1500 milliGRAM(s) Oral every 12 hours  LORazepam     Tablet 1 milliGRAM(s) Oral three times a day  minocycline 200 milliGRAM(s) Oral <User Schedule>  pantoprazole   Suspension 40 milliGRAM(s) Oral daily  petrolatum Ophthalmic Ointment 1 Application(s) Both EYES four times a day  phenytoin   Suspension 150 milliGRAM(s) Oral <User Schedule>  phenytoin   Suspension 200 milliGRAM(s) Oral <User Schedule>  polyethylene glycol 3350 17 Gram(s) Oral daily  propranolol 5 milliGRAM(s) Oral every 12 hours  senna 2 Tablet(s) Oral at bedtime  sodium chloride 0.65% Nasal 1 Spray(s) Both Nostrils two times a day  trimethoprim  160 mG/sulfamethoxazole 800 mG 2 Tablet(s) Oral every 8 hours    MEDICATIONS  (PRN):  acetaminophen   Oral Liquid .. 650 milliGRAM(s) Oral every 6 hours PRN Temp greater or equal to 38C (100.4F), Mild Pain (1 - 3)  LORazepam   Injectable 2 milliGRAM(s) IV Push once PRN Seizure Activity (NOTIFY PROVIDER PRIOR TO GIVING)      CULTURES:  Culture Results:   Moderate Stenotrophomonas maltophilia  Commensal iram consistent with body site (02-13 @ 20:26)      RADIOLOGY & ADDITIONAL TESTS:      ASSESSMENT:  44M PMH ?stroke/MI present to [facility] after collapsing at work. Decorticate posturing, vomiting, intubated for airway protection. 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 Transferred to [facility] for further management. s/p trach 10/14. s/p peg 10/21. Re-upgrade to ICU 2/2 desaturation event and suctioning requirements 11/15. Re-upgrade to NSICU 12/15 2/2 desaturation and tachycardia.    PLAN:  Neuro:  - neuro/vitals q4h  - pain control: tylenol prn  - seizure tx: keppra 1500mg BID, phenytoin 150/150/200, ativan 1mg TID  - s/p vEEG (10/3-4), (10/17-10/19), (12/17-12/18), (12/22-12/25), (1/26-1/28), (2/16 -    - CTH 9/30: enlarged pontine hemorrhage, CTH 10/3: stable, CTH 10/25: mostly resolved pontine hemorrhage, CTH 12/15: R mastoid air cell opacification; acute otitis media vs sterile effusion, CTH 12/18: stable. CTH 1/21 stable, CTH 1/27 stable  - MRI brain 10/12: parenchymal hemorrhage, acute/subacute R cerebellar stroke      CV:  - -160  - tachycardia: propranolol 5mg BID   - HTN: amlodipine 5mg  - echo (9/30) EF 75%, repeat 12/16: 57%    PULM:  - pend trach exchange with pulm at bedside 2/14 to vent, AC/VC 40/400/14/6  - Secretions: duonebs/chest PT q4h  - CT chest completed 2/16, f/u read    GI:  - TFs via PEG, [facility]  - bowel regimen, last BM 2/17  - baclofen 5q12 for hiccups    Renal:  - IVL  - FW 200q8 for hydration  - Voiding, SC prn  - CKD: trend BUN/Cr  - renal US 10/1, 10/8, 1/15: Increased bilateral renal echogenicity consistent with medical renal disease    Endo:  - A1c 5.4    Heme:  - DVT ppx: SCDs, SQH 5000u q8h   - LE dopplers negative 12/16    ID:  - last blood/sputum cx 2/17: sputum growing stenotrophomonas  - currently on: DS bactrim 2 tabs TID, 200 mg minocycline BID per ID (pending duration)  - +klebsiella UTI s/p levaquin (1/30-2/3)  - empiric PNA: cefepime (12/22-12/30), s/p vanc (12/22-12/23), s/p zosyn (12/15-12/20), s/p vanc (12/15-12/16), s/p zosyn (1/12 -1/18)  - s/p Stenotrophomonas maltophilia PNA: s/p Bactrim (11/18-11/19) s/p Cefepime (11/16-11/18)  - 11/3, (+) sputum for stenotrophomas maltophlia, blood cx (+) klebsiella, cefepime 2gq12 (11/6 - 11/12)   - S/p Ancef (10/4-10/14) for PNA, and s/p Unasyn (10/18-10/23) +actinobacter baumanii     MISC:  - BL keratitis: BL erythromycin ointment, artificial tears, lacrilube q4, moisture chamber   - Penile wound: wound care rec barrier wipes     Dispo: SDU status, DNR, pending SNF    D/w Dr. D'Amico   Assessment: present when checked     [] GCS   E   V   M     Heart Failure: [] Acute, [] acute on chronic, [] chronic   Heart Failure: [] Diastolic (HFpEF), [] Systolic (HRrEF), [] Combined (HFpEF and HFrEF), [] RHF, [] Pulm HTN, [] Other     [] ANIKA, [] ATN, [] AIN, [] other   [] CKD1, [] CKD2, [] CKD3, [] CKD4, [] CKD5, [] ESRD     Encephalopathy: [] Metabolic, [] Hepatic, [] Toxic, [] Neurological, [] Other     Abnormal Nutritional Status: [] malnutrition (see nutrition note), []underweight: BMI <19, [] morbid obesity: BMI >40, [] Cachexia     [] Sepsis   [] Hypovolemic shock, [] Cardiogenic shock, [] Hemorrhagic shock, [] Neurogenic shock   [] Acute respiratory failure   [] Cerebral edema, [] Brain compression / herniation   [] Functional quadriplegia   [] Acute blood loss anemia

## 2025-02-18 NOTE — PROGRESS NOTE ADULT - SUBJECTIVE AND OBJECTIVE BOX
INTERVAL HPI/OVERNIGHT EVENTS:  Afebrile    ROS:  Unobtainable - locked in      ANTIBIOTICS/RELEVANT:          Vital Signs Last 24 Hrs  T(C): 36.7 (18 Feb 2025 17:31), Max: 36.9 (17 Feb 2025 22:34)  T(F): 98 (18 Feb 2025 17:31), Max: 98.4 (17 Feb 2025 22:34)  HR: 82 (18 Feb 2025 17:30) (62 - 82)  BP: 123/81 (18 Feb 2025 17:30) (100/69 - 132/91)  BP(mean): 97 (18 Feb 2025 17:30) (80 - 107)  RR: 14 (18 Feb 2025 17:30) (14 - 15)  SpO2: 99% (18 Feb 2025 17:30) (95% - 100%)    Parameters below as of 18 Feb 2025 17:30  Patient On (Oxygen Delivery Method): ventilator    O2 Concentration (%): 40    PHYSICAL EXAM:  HEENT: Head wrapped for EEG, ointment in eyes, PERRL.  No nasal exudate or sinus tenderness;  Unable to visualize pharynx.  Sustained facial twitching  Neck:  +trach  Respiratory:  Clear bilaterally anteriorly  Cardiovascular:  RRR, S1S2, no murmur appreciated  Gastrointestinal:  +PEG, normoactive BS, soft, NT, no masses, guarding or rebound.  No HSM  Extremities:  No edema      LABS:                        10.2   6.30  )-----------( 239      ( 18 Feb 2025 05:30 )             32.2         02-18    136  |  100  |  50[H]  ----------------------------<  102[H]  4.5   |  26  |  1.27    Ca    9.6      18 Feb 2025 05:30  Phos  4.8     02-18  Mg     2.3     02-18        Urinalysis Basic - ( 18 Feb 2025 05:30 )    Color: x / Appearance: x / SG: x / pH: x  Gluc: 102 mg/dL / Ketone: x  / Bili: x / Urobili: x   Blood: x / Protein: x / Nitrite: x   Leuk Esterase: x / RBC: x / WBC x   Sq Epi: x / Non Sq Epi: x / Bacteria: x        MICROBIOLOGY:        RADIOLOGY & ADDITIONAL STUDIES:

## 2025-02-18 NOTE — PROGRESS NOTE ADULT - ASSESSMENT
44M with unclear PMH (?stroke, MI) who was found down at work, intubated for airway protection and found to have acute parenchymal hemorrhage within nabil with mass effect (+ acute/subacute right cerebellar infarct) in setting of hypertensive emergency, transferred to [facility] for further neurosurgical care. Hospital course c/b poor neurologic recovery s/p trach-PEG, AUR s/p gabriel, ANIKA on CKD c/b hyperkalemia, HAP s/p amp-sulbactam (EOT 10/23), K aerogenes bacteremia  treated with 2 weeks of Cefepime per ID , On 11/15 he became septic and was transferred to NSICU due to increased o2 requirements and needed Vent support , Trach Cultures + for Stenotrophomonas which was treated with 7 days of Bactrim per ID , has been weaned of Vent since 11/23 and is now on 10lts at 40% o2 through Trach Collar. Stepped up to the NSICU on 12/15 for hypoxia and tachycardia. Pt s/p  abx for 5 days. Pt now stepped down to 8Lach.    # Pontine hemorrhage  # Encephalopathy due to Intracranial Bleed   - Acute parenchymal hemorrhage within nabil with mass effect (+ acute/subacute right cerebellar infarct) in setting of hypertensive emergency.   - MRI brain also demonstrated acute/subacute R cerebellar stroke.   - No Neurosurgical Interventions were performed   - Secondary to IPH and cerebellar stroke - Trach and PEG Dependent    # Seizures  -Pt with episode of facial and leg twitching on 2/16 which resolved after the pt was given ativan 4mg x1.   - Continue Seizure precautions   - Continue Keppra and phenytoin  - Will continue EEG monitoring and will f/u with Epilepsy team      # Hypertension  - Continue amlodipine 5mg daily with holding parameters  - Continue Propranolol with holding parameters     #Acute on Chronic respiratory failure likely 2/2 Pneumonia    - s/p percutaneous trach by pulm on 10/14/24  - Pt s/p trach Exchange by Pulmonary on 2/14.   - Pt's BAL on 2/13 growing Stenotrophomonas   - Continue vent support and chest PT  - F/u ID recommendations - Bactrim and Minocycline     # Neurogenic dysphagia.   - Pt s/p PEG placement by  surgery 10/21/24  - Continue TF per nutrition  - Continue aspiration precautions and elevation of HOB.    # Acute urinary retention.   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 The necessity of the time spent during the encounter on this date of service was due to:    Review of hospital course, labs, vitals, medical records.  Bedside exam and interview of the patient  Discussed plan of care with primary team   Documenting the encounter.

## 2025-02-18 NOTE — PROGRESS NOTE ADULT - ASSESSMENT
44 year-old-male with unclear PMH (?stroke, MI) who was found down at work, intubated for airway protection and found to have acute large parenchymal hemorrhage within nabil with mass effect (+ acute/subacute right cerebellar infarct) in setting of hypertensive emergency, and R cerebellar stroke transferred to [facility] for further neurosurgical care. 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 Epilepsy recalled as pt with vertical eye movements that appeared rhythmic and resolved after given AM Keppra +additional 500mg. Dilantin level 3.3. High suspicion for continued focal seizures, however given location of the bleed, initially suspected that repeating EEG will have limited utility. Unclear if EEG negative for focal seizures or hemifacial spasm is from pontine irritation. Given subtherapeutic level, we recommend increasing Dilantin and monitoring clinically for improvement. On 1/26, pt was repotedly foaming at the mouth, less responsive with increasing in facial twitching (video reviewed by attending, which seemed equivocal to prior). vEEG was restarted Ativan 8mg and Fosphenytoin 1500mg was given by primary team. Epilepsy recommended increasing phenytoin to 150/150/200mg. Level on 1/27 was 18.2 vEEG was negative so was discontinued. On reevaluation 1/30, facial twitching appears improved, localizing only to the L lower mouth and less pronounced than prior (previously in the upper face and lower mouth). Level on 2/17 is 18.5. On 2/16, patient had an episode of moving head, arms and leg uncontrollably with desaturation treated with Ativan 4 mg IV stopping movements. Placed patient on vEEG today.On reevaluation, patient presenting with similar previous twitching movements that were described before. BUN/Cr trending up potentially contributing to those movements. Nonetheless, we decide to start Ativan to control movements. Movements persistent but his baseline on 2/18, neither family nor primary team concern since he does have this movements on and off in the last weeks. We decided to decrease Ativan to improve mental status    Recommendations:  - Management by primary team   - D/c vEEG  - Continue Keppra 1500mg Q12hrs   - Continue Phenytoin to 150/150/200mg   - Reduce Ativan 1 mg to q12h   - Maintain seizure/fall/aspiration precautions  - If any neurological changes or any further question, please contact back epilepsy    Discussed with Attending Dr. Ureña

## 2025-02-18 NOTE — CHART NOTE - NSCHARTNOTEFT_GEN_A_CORE
Admitting Diagnosis:   Patient is a 44y old  Male who presents with a chief complaint of brain stem bleed (18 Feb 2025 00:11)      PAST MEDICAL & SURGICAL HISTORY:  Acute myocardial infarction  CVA (cerebral vascular accident)      Current Nutrition Order:   Diet, NPO with Tube Feed:   Tube Feeding Modality: Gastrostomy  candelaria condon renal  Total Volume for 24 Hours (mL): 1088  Total Number of Cans: 5  Bolus  Total Volume of Bolus (mL):  544  Tube Feed Frequency: Every 12 hours   Tube Feed Start Time: 08:00  Bolus Feed Rate (mL per Hour): 68   Bolus Feed Duration (in Hours): 8  Bolus Feed Instructions:  Run bolus feeds from 8am to 4pm and from 8pm to 4am (02-17-25 @ 15:09)      PO Intake: N/A    GI Issues: Per flowsheets, last BM 2/17 (2x, fecal incontinence); abdomen soft/nontender; + bowel sounds. No nausea, vomiting, constipation noted.     Pain: No non-verbal indicators of pain noted in flowsheets.    Skin Integrity:  Mookie score 10  1+ generalized edema  Skin tear to penis  Unstageable pressure injury to penis      02-17-25 @ 07:01  -  02-18-25 @ 07:00  --------------------------------------------------------  IN: 1348 mL / OUT: 975 mL / NET: 373 mL        Labs:   02-18    136  |  100  |  50[H]  ----------------------------<  102[H]  4.5   |  26  |  1.27    Ca    9.6      18 Feb 2025 05:30  Phos  4.8     02-18  Mg     2.3     02-18      CAPILLARY BLOOD GLUCOSE          Medications:  MEDICATIONS  (STANDING):  albuterol/ipratropium for Nebulization 3 milliLiter(s) Nebulizer every 4 hours  amLODIPine   Tablet 5 milliGRAM(s) Oral daily  artificial tears (preservative free) Ophthalmic Solution 1 Drop(s) Right EYE every 4 hours  baclofen 5 milliGRAM(s) Oral every 12 hours  chlorhexidine 0.12% Liquid 15 milliLiter(s) Oral Mucosa every 12 hours  doxazosin 8 milliGRAM(s) Oral at bedtime  erythromycin   Ointment 1 Application(s) Both EYES four times a day  fluticasone propionate 50 MICROgram(s)/spray Nasal Spray 1 Spray(s) Both Nostrils two times a day  heparin   Injectable 5000 Unit(s) SubCutaneous every 8 hours  influenza   Vaccine 0.5 milliLiter(s) IntraMuscular once  levETIRAcetam 1500 milliGRAM(s) Oral every 12 hours  LORazepam     Tablet 1 milliGRAM(s) Oral three times a day  minocycline 200 milliGRAM(s) Oral <User Schedule>  pantoprazole   Suspension 40 milliGRAM(s) Oral daily  petrolatum Ophthalmic Ointment 1 Application(s) Both EYES four times a day  phenytoin   Suspension 150 milliGRAM(s) Oral <User Schedule>  phenytoin   Suspension 200 milliGRAM(s) Oral <User Schedule>  polyethylene glycol 3350 17 Gram(s) Oral daily  propranolol 5 milliGRAM(s) Oral every 12 hours  senna 2 Tablet(s) Oral at bedtime  sodium chloride 0.65% Nasal 1 Spray(s) Both Nostrils two times a day  trimethoprim  160 mG/sulfamethoxazole 800 mG 2 Tablet(s) Oral every 8 hours    MEDICATIONS  (PRN):  acetaminophen   Oral Liquid .. 650 milliGRAM(s) Oral every 6 hours PRN Temp greater or equal to 38C (100.4F), Mild Pain (1 - 3)  LORazepam   Injectable 2 milliGRAM(s) IV Push once PRN Seizure Activity (NOTIFY PROVIDER PRIOR TO GIVING)      Height for BMI (FEET)	5 Feet  Height for BMI (INCHES)	8 Inch(s)  Height for BMI (CM)	172.72 Centimeter(s)  Weight for BMI (lbs)	176.4 lb  Weight for BMI (kg)	80 kg  Body Mass Index	              26.8  ideal body weight:               154 pounds / 70 kg   % ideal body weight             114%       Weight Change: No new wt obtained since admit per nursing documentation, strongly recommend nursing to obtain new wt to track/trend changes.   RD obtained bedweight today (1/23/25) of ~79.5kg, consistent with admission weight. RD to follow up with nursing.   RD obtained bedweight today (2/4/25) of ~76.1kg, consistent with admission weight. RD to follow up with nursing.   RD obtained bedweight today (2/11/25) of ~76.2kg, not significantly decreased compared to admission wt.    Estimated energy needs:   Weight used for calculations: most recent actual body weight  weight: 76.1kg (~167%)  calories: 25-30 calories/kg = ~3871-7074 calories/day  protein: 1.1-1.4g protein/kg = ~84-107g protein/day  fluids: 1mL/kcal = ~1903-2283mL/day  Other Calculations	Pt is ~% ideal body weight (~109%), thus actual body weight used for all calculations. Needs adjusted for age, clinical course.     Subjective:   This is a 44 year old male, unknown past medical history (reported stroke and MI by coworkers) presented to [facility] with AMS, Pt was working at [employer] when he was found down by coworkers. EMS called and pt brought to [facility] ED. Intubated, sedated, started on cardene for SBPs in 200s. CT head showed brain stem bleed. Transferred to NSICU for further management. Pt is status post tracheostomy 10/14. Pt is status post PEG on 10/21. Re-upgrade to ICU in the setting of desaturation event and suctioning requirements 11/15. Re-upgrade to NSICU 12/15 2/2 desaturation and tachycardia. Pt has since been stepped down to [facility].     Patient seen at bedside on [facility] for nutrition follow up. Pt vented to VC-AC, not sedated, MAP 94. RD spoke to nursing on the unit, pt was resting in bed. Current diet order: NPO with tube feeds of Candelaria [employer] Renal formula, at goal rate of 68 mL/hr for a total of 16 hrs with two 4hr breaks (providing ~1958kcal, 87g protein, 718mL free water), meeting 100% estimated nutrient needs. Diet order was changed from Candelaria [employer] Standard to Candelaria [employer] Renal formula in setting of abnormal renal labs and tube feed regimen changed this AM (2/18) to accommodate pt starting new medication (minocycline) that requires feeds to be held for 2 hours before and after administration; patient will receive this medication 2x/day for total of 8 hours where feeds need to be held. No tube feeding tolerance issues noted. GI within normal limits at this time as per flowsheets, last BM 2/17 (2x, fecal incontinence); + bowel sounds. Labs and medications reviewed. Notable labs: BUN 50 <H>, Phos 4.8 <H>, POCT blood glucose 130 (2/15). Ordered for: IV antibiotics, miralax, senna, protonix, pain regimen, minocycline. RD will continue to follow, please see nutrition recommendations below.     Previous Nutrition Diagnosis: Inadequate Oral Intake related to inability to meet nutritional demands via PO as evidenced by NPO with tube feedings    Active [ x ]  Resolved [   ]     Goal: Pt will consume >/= 75% of estimated nutrient needs via tolerated route     Recommendations:  1. Continue Cand[employer] Farms renal via gastrostomy @68mL/hr x16hrs to provide 1088mL total volume, 1958kcal, 87g protein, 718mL free water meeting 26kcal/kg, 1.1g/kg protein based on recent body weight of 76.1kg obtained by RD.   **Provide Banatrol Tube Feeding prn**  **Free water flushes per team  **Maintain aspiration precautions at all times; monitor for signs/symptoms of intolerance**  2. Monitor weights (weekly), GI function, skin integrity; electrolytes, BMP, glucose, CBC per MD discretion *renal indices*  3. Pain and bowel regimen per medical team  4. Align nutrition with goals of care at all times  5. Recommend nursing to obtain new weights to track/trend changes    Education: Deferred at this time related to pt resting in bed, and cognitive status.     Risk Level: High [ x ] Moderate [   ] Low [   ] Admitting Diagnosis:   Patient is a 44y old  Male who presents with a chief complaint of brain stem bleed (18 Feb 2025 00:11)      PAST MEDICAL & SURGICAL HISTORY:  Acute myocardial infarction  CVA (cerebral vascular accident)      Current Nutrition Order:   Diet, NPO with Tube Feed:   Tube Feeding Modality: Gastrostomy  candelaria condon renal  Total Volume for 24 Hours (mL): 1088  Total Number of Cans: 5  Bolus  Total Volume of Bolus (mL):  544  Tube Feed Frequency: Every 12 hours   Tube Feed Start Time: 08:00  Bolus Feed Rate (mL per Hour): 68   Bolus Feed Duration (in Hours): 8  Bolus Feed Instructions:  Run bolus feeds from 8am to 4pm and from 8pm to 4am (02-17-25 @ 15:09)      PO Intake: N/A    GI Issues: Per flowsheets, last BM 2/17 (2x, fecal incontinence); abdomen soft/nontender; + bowel sounds. No nausea, vomiting, constipation noted.     Pain: No non-verbal indicators of pain noted in flowsheets.    Skin Integrity:  Mookie score 10  1+ generalized edema  Skin tear to penis  Unstageable pressure injury to penis      02-17-25 @ 07:01  -  02-18-25 @ 07:00  --------------------------------------------------------  IN: 1348 mL / OUT: 975 mL / NET: 373 mL        Labs:   02-18    136  |  100  |  50[H]  ----------------------------<  102[H]  4.5   |  26  |  1.27    Ca    9.6      18 Feb 2025 05:30  Phos  4.8     02-18  Mg     2.3     02-18      CAPILLARY BLOOD GLUCOSE          Medications:  MEDICATIONS  (STANDING):  albuterol/ipratropium for Nebulization 3 milliLiter(s) Nebulizer every 4 hours  amLODIPine   Tablet 5 milliGRAM(s) Oral daily  artificial tears (preservative free) Ophthalmic Solution 1 Drop(s) Right EYE every 4 hours  baclofen 5 milliGRAM(s) Oral every 12 hours  chlorhexidine 0.12% Liquid 15 milliLiter(s) Oral Mucosa every 12 hours  doxazosin 8 milliGRAM(s) Oral at bedtime  erythromycin   Ointment 1 Application(s) Both EYES four times a day  fluticasone propionate 50 MICROgram(s)/spray Nasal Spray 1 Spray(s) Both Nostrils two times a day  heparin   Injectable 5000 Unit(s) SubCutaneous every 8 hours  influenza   Vaccine 0.5 milliLiter(s) IntraMuscular once  levETIRAcetam 1500 milliGRAM(s) Oral every 12 hours  LORazepam     Tablet 1 milliGRAM(s) Oral three times a day  minocycline 200 milliGRAM(s) Oral <User Schedule>  pantoprazole   Suspension 40 milliGRAM(s) Oral daily  petrolatum Ophthalmic Ointment 1 Application(s) Both EYES four times a day  phenytoin   Suspension 150 milliGRAM(s) Oral <User Schedule>  phenytoin   Suspension 200 milliGRAM(s) Oral <User Schedule>  polyethylene glycol 3350 17 Gram(s) Oral daily  propranolol 5 milliGRAM(s) Oral every 12 hours  senna 2 Tablet(s) Oral at bedtime  sodium chloride 0.65% Nasal 1 Spray(s) Both Nostrils two times a day  trimethoprim  160 mG/sulfamethoxazole 800 mG 2 Tablet(s) Oral every 8 hours    MEDICATIONS  (PRN):  acetaminophen   Oral Liquid .. 650 milliGRAM(s) Oral every 6 hours PRN Temp greater or equal to 38C (100.4F), Mild Pain (1 - 3)  LORazepam   Injectable 2 milliGRAM(s) IV Push once PRN Seizure Activity (NOTIFY PROVIDER PRIOR TO GIVING)      Height for BMI (FEET)	5 Feet  Height for BMI (INCHES)	8 Inch(s)  Height for BMI (CM)	172.72 Centimeter(s)  Weight for BMI (lbs)	176.4 lb  Weight for BMI (kg)	80 kg  Body Mass Index	              26.8  ideal body weight:               154 pounds / 70 kg   % ideal body weight             114%       Weight Change: No new wt obtained since admit per nursing documentation, strongly recommend nursing to obtain new wt to track/trend changes.   RD obtained bedweight today (1/23/25) of ~79.5kg, consistent with admission weight. RD to follow up with nursing.   RD obtained bedweight today (2/4/25) of ~76.1kg, consistent with admission weight. RD to follow up with nursing.   RD obtained bedweight today (2/11/25) of ~76.2kg, not significantly decreased compared to admission wt.    Estimated energy needs:   Weight used for calculations: most recent actual body weight  weight: 76.1kg (~167%)  calories: 25-30 calories/kg = ~7676-2276 calories/day  protein: 1.1-1.4g protein/kg = ~84-107g protein/day  fluids: 1mL/kcal = ~1903-2283mL/day  Other Calculations	Pt is ~% ideal body weight (~109%), thus actual body weight used for all calculations. Needs adjusted for age, clinical course.     Subjective:   This is a 44 year old male, unknown past medical history (reported stroke and MI by coworkers) presented to [facility] with AMS, Pt was working at [employer] when he was found down by coworkers. EMS called and pt brought to [facility] ED. Intubated, sedated, started on cardene for SBPs in 200s. CT head showed brain stem bleed. Transferred to NSICU for further management. Pt is status post tracheostomy 10/14. Pt is status post PEG on 10/21. Re-upgrade to ICU in the setting of desaturation event and suctioning requirements 11/15. Re-upgrade to NSICU 12/15 2/2 desaturation and tachycardia. Pt has since been stepped down to [facility].     Patient seen at bedside on [facility] for nutrition follow up. Pt vented to VC-AC, not sedated, MAP 94. RD spoke to nursing on the unit, pt was resting in bed. Current diet order: NPO with tube feeds of Candelaria [employer] Renal formula, at goal rate of 68 mL/hr for a total of 16 hrs with two 4hr breaks (providing ~1958kcal, 87g protein, 718mL free water), meeting 100% estimated nutrient needs. Diet order was changed from Candelaria EBD[employer] Standard to Candelaria EBDSoft Renal formula in setting of abnormal renal labs and tube feed regimen changed this AM (2/18) to accommodate pt starting new medication (minocycline) that requires feeds to be held for 2 hours before and after administration; patient will receive this medication 2x/day for total of 8 hours where feeds need to be held. No tube feeding tolerance issues noted. GI within normal limits at this time as per flowsheets, last BM 2/17 (2x, fecal incontinence); + bowel sounds. Labs and medications reviewed. Notable labs: BUN 50 <H>, Phos 4.8 <H>, POCT blood glucose 130 (2/15). Ordered for: IV antibiotics, miralax, senna, protonix, pain regimen, minocycline. RD will continue to follow, please see nutrition recommendations below.     Previous Nutrition Diagnosis: Inadequate Oral Intake related to inability to meet nutritional demands via PO as evidenced by NPO with tube feedings    Active [ x ]  Resolved [   ]     Goal: Pt will consume >/= 75% of estimated nutrient needs via tolerated route     Recommendations:  1. Continue Candelaria Farms renal via gastrostomy @68mL/hr x16hrs to provide 1088mL total volume, 1958kcal, 87g protein, 718mL free water meeting 26kcal/kg, 1.1g/kg protein based on recent body weight of 76.1kg obtained by RD.   **Provide Banatrol Tube Feeding prn**  **Free water flushes per team  **Maintain aspiration precautions at all times; monitor for signs/symptoms of intolerance**  2. Monitor weights (weekly), GI function, skin integrity; electrolytes, BMP, glucose, CBC per MD discretion *renal indices*  3. Pain and bowel regimen per medical team  4. Align nutrition with goals of care at all times  5. Recommend nursing to obtain new weights to track/trend changes    Education: Deferred at this time related to pt resting in bed, and cognitive status.     Risk Level: High [  ] Moderate [ x ] Low [   ] Admitting Diagnosis:   Patient is a 44y old  Male who presents with a chief complaint of brain stem bleed (18 Feb 2025 00:11)      PAST MEDICAL & SURGICAL HISTORY:  Acute myocardial infarction  CVA (cerebral vascular accident)      Current Nutrition Order:   Diet, NPO with Tube Feed:   Tube Feeding Modality: Gastrostomy  candelaria condon renal  Total Volume for 24 Hours (mL): 1088  Total Number of Cans: 5  Bolus  Total Volume of Bolus (mL):  544  Tube Feed Frequency: Every 12 hours   Tube Feed Start Time: 08:00  Bolus Feed Rate (mL per Hour): 68   Bolus Feed Duration (in Hours): 8  Bolus Feed Instructions:  Run bolus feeds from 8am to 4pm and from 8pm to 4am (02-17-25 @ 15:09)      PO Intake: N/A    GI Issues: Per flowsheets, last BM 2/17 (2x, fecal incontinence); abdomen soft/nontender; + bowel sounds. No nausea, vomiting, constipation noted.     Pain: No non-verbal indicators of pain noted in flowsheets.    Skin Integrity:  Mookie score 10  1+ generalized edema  Skin tear to penis  Unstageable pressure injury to penis      02-17-25 @ 07:01  -  02-18-25 @ 07:00  --------------------------------------------------------  IN: 1348 mL / OUT: 975 mL / NET: 373 mL        Labs:   02-18    136  |  100  |  50[H]  ----------------------------<  102[H]  4.5   |  26  |  1.27    Ca    9.6      18 Feb 2025 05:30  Phos  4.8     02-18  Mg     2.3     02-18      CAPILLARY BLOOD GLUCOSE          Medications:  MEDICATIONS  (STANDING):  albuterol/ipratropium for Nebulization 3 milliLiter(s) Nebulizer every 4 hours  amLODIPine   Tablet 5 milliGRAM(s) Oral daily  artificial tears (preservative free) Ophthalmic Solution 1 Drop(s) Right EYE every 4 hours  baclofen 5 milliGRAM(s) Oral every 12 hours  chlorhexidine 0.12% Liquid 15 milliLiter(s) Oral Mucosa every 12 hours  doxazosin 8 milliGRAM(s) Oral at bedtime  erythromycin   Ointment 1 Application(s) Both EYES four times a day  fluticasone propionate 50 MICROgram(s)/spray Nasal Spray 1 Spray(s) Both Nostrils two times a day  heparin   Injectable 5000 Unit(s) SubCutaneous every 8 hours  influenza   Vaccine 0.5 milliLiter(s) IntraMuscular once  levETIRAcetam 1500 milliGRAM(s) Oral every 12 hours  LORazepam     Tablet 1 milliGRAM(s) Oral three times a day  minocycline 200 milliGRAM(s) Oral <User Schedule>  pantoprazole   Suspension 40 milliGRAM(s) Oral daily  petrolatum Ophthalmic Ointment 1 Application(s) Both EYES four times a day  phenytoin   Suspension 150 milliGRAM(s) Oral <User Schedule>  phenytoin   Suspension 200 milliGRAM(s) Oral <User Schedule>  polyethylene glycol 3350 17 Gram(s) Oral daily  propranolol 5 milliGRAM(s) Oral every 12 hours  senna 2 Tablet(s) Oral at bedtime  sodium chloride 0.65% Nasal 1 Spray(s) Both Nostrils two times a day  trimethoprim  160 mG/sulfamethoxazole 800 mG 2 Tablet(s) Oral every 8 hours    MEDICATIONS  (PRN):  acetaminophen   Oral Liquid .. 650 milliGRAM(s) Oral every 6 hours PRN Temp greater or equal to 38C (100.4F), Mild Pain (1 - 3)  LORazepam   Injectable 2 milliGRAM(s) IV Push once PRN Seizure Activity (NOTIFY PROVIDER PRIOR TO GIVING)      Height for BMI (FEET)	5 Feet  Height for BMI (INCHES)	8 Inch(s)  Height for BMI (CM)	172.72 Centimeter(s)  Weight for BMI (lbs)	176.4 lb  Weight for BMI (kg)	80 kg  Body Mass Index	              26.8  ideal body weight:               154 pounds / 70 kg   % ideal body weight             114%       Weight Change: No new wt obtained since admit per nursing documentation, strongly recommend nursing to obtain new wt to track/trend changes.   RD obtained bedweight today (1/23/25) of ~79.5kg, consistent with admission weight. RD to follow up with nursing.   RD obtained bedweight today (2/4/25) of ~76.1kg, consistent with admission weight. RD to follow up with nursing.   RD obtained bedweight today (2/11/25) of ~76.2kg, not significantly decreased compared to admission wt.    Estimated energy needs:   Weight used for calculations: most recent actual body weight  weight: 76.1kg (~167%)  calories: 25-30 calories/kg = ~3442-5107 calories/day  protein: 1.1-1.4g protein/kg = ~84-107g protein/day  fluids: 1mL/kcal = ~1903-2283mL/day  Other Calculations	Pt is ~% ideal body weight (~109%), thus actual body weight used for all calculations. Needs adjusted for age, clinical course, ventilation.     Subjective:   This is a 44 year old male, unknown past medical history (reported stroke and MI by coworkers) presented to [facility] with AMS, Pt was working at [employer] when he was found down by coworkers. EMS called and pt brought to [facility] ED. Intubated, sedated, started on cardene for SBPs in 200s. CT head showed brain stem bleed. Transferred to NSICU for further management. Pt is status post tracheostomy 10/14. Pt is status post PEG on 10/21. Re-upgrade to ICU in the setting of desaturation event and suctioning requirements 11/15. Re-upgrade to NSICU 12/15 2/2 desaturation and tachycardia. Pt has since been stepped down to [facility].     Patient seen at bedside on [facility] for nutrition follow up. Pt afebrile, vented to VC-AC, not sedated, not on pressors, MAP 94. RD spoke to nursing on the unit, pt was resting in bed. Current diet order: NPO with tube feeds of Candelaria [employer] Renal formula, at goal rate of 68 mL/hr for a total of 16 hrs with two 4hr breaks (providing ~1958kcal, 87g protein, 718mL free water), meeting 100% estimated nutrient needs. Diet order was changed from Candelaria [employer] Standard to Candelaria [employer] Renal formula in setting of abnormal renal labs and tube feed regimen changed this AM (2/18) to accommodate pt starting new medication (minocycline) that requires feeds to be held for 2 hours before and after administration; patient will receive this medication 2x/day for total of 8 hours where feeds need to be held. No tube feeding tolerance issues noted. GI within normal limits at this time as per flowsheets, last BM 2/17 (2x, fecal incontinence); + bowel sounds. Labs and medications reviewed. Notable labs: BUN 50 <H>, Phos 4.8 <H>, POCT blood glucose 130 (2/15). Ordered for: IV antibiotics, miralax, senna, protonix, pain regimen, minocycline. RD will continue to follow, please see nutrition recommendations below.     Previous Nutrition Diagnosis: Inadequate Oral Intake related to inability to meet nutritional demands via PO as evidenced by NPO with tube feedings    Active [ x ]  Resolved [   ]     Goal: Pt will consume >/= 75% of estimated nutrient needs via tolerated route     Recommendations:  1. Continue [employer] renal via gastrostomy @68mL/hr x16hrs to provide 1088mL total volume, 1958kcal, 87g protein, 718mL free water meeting 26kcal/kg, 1.1g/kg protein based on recent body weight of 76.1kg obtained by RD.   **Provide Banatrol Tube Feeding prn**  **Free water flushes per team  **Maintain aspiration precautions at all times; monitor for signs/symptoms of intolerance**  2. Monitor weights (weekly), GI function, skin integrity; electrolytes, BMP, glucose, CBC per MD discretion *renal indices*  3. Pain and bowel regimen per medical team  4. Align nutrition with goals of care at all times  5. Recommend nursing to obtain new weights to track/trend changes    Education: Deferred at this time related to pt resting in bed, and cognitive status.     Risk Level: High [  ] Moderate [ x ] Low [   ] Admitting Diagnosis:   Patient is a 44y old  Male who presents with a chief complaint of brain stem bleed (18 Feb 2025 00:11)      PAST MEDICAL & SURGICAL HISTORY:  Acute myocardial infarction  CVA (cerebral vascular accident)      Current Nutrition Order:   Diet, NPO with Tube Feed:   Tube Feeding Modality: Gastrostomy  candelaria condon renal  Total Volume for 24 Hours (mL): 1088  Total Number of Cans: 5  Bolus  Total Volume of Bolus (mL):  544  Tube Feed Frequency: Every 12 hours   Tube Feed Start Time: 08:00  Bolus Feed Rate (mL per Hour): 68   Bolus Feed Duration (in Hours): 8  Bolus Feed Instructions:  Run bolus feeds from 8am to 4pm and from 8pm to 4am (02-17-25 @ 15:09)      PO Intake: N/A    GI Issues: Per flowsheets, last BM 2/17 (2x, fecal incontinence); abdomen soft/nontender; bowel sounds present. No nausea, vomiting, constipation noted.     Pain: No non-verbal indicators of pain noted in flowsheets.    Skin Integrity:  Mookie score 10  1+ generalized edema  Skin tear to penis  Unstageable pressure injury to penis      02-17-25 @ 07:01  -  02-18-25 @ 07:00  --------------------------------------------------------  IN: 1348 mL / OUT: 975 mL / NET: 373 mL        Labs:   02-18    136  |  100  |  50[H]  ----------------------------<  102[H]  4.5   |  26  |  1.27    Ca    9.6      18 Feb 2025 05:30  Phos  4.8     02-18  Mg     2.3     02-18      CAPILLARY BLOOD GLUCOSE          Medications:  MEDICATIONS  (STANDING):  albuterol/ipratropium for Nebulization 3 milliLiter(s) Nebulizer every 4 hours  amLODIPine   Tablet 5 milliGRAM(s) Oral daily  artificial tears (preservative free) Ophthalmic Solution 1 Drop(s) Right EYE every 4 hours  baclofen 5 milliGRAM(s) Oral every 12 hours  chlorhexidine 0.12% Liquid 15 milliLiter(s) Oral Mucosa every 12 hours  doxazosin 8 milliGRAM(s) Oral at bedtime  erythromycin   Ointment 1 Application(s) Both EYES four times a day  fluticasone propionate 50 MICROgram(s)/spray Nasal Spray 1 Spray(s) Both Nostrils two times a day  heparin   Injectable 5000 Unit(s) SubCutaneous every 8 hours  influenza   Vaccine 0.5 milliLiter(s) IntraMuscular once  levETIRAcetam 1500 milliGRAM(s) Oral every 12 hours  LORazepam     Tablet 1 milliGRAM(s) Oral three times a day  minocycline 200 milliGRAM(s) Oral <User Schedule>  pantoprazole   Suspension 40 milliGRAM(s) Oral daily  petrolatum Ophthalmic Ointment 1 Application(s) Both EYES four times a day  phenytoin   Suspension 150 milliGRAM(s) Oral <User Schedule>  phenytoin   Suspension 200 milliGRAM(s) Oral <User Schedule>  polyethylene glycol 3350 17 Gram(s) Oral daily  propranolol 5 milliGRAM(s) Oral every 12 hours  senna 2 Tablet(s) Oral at bedtime  sodium chloride 0.65% Nasal 1 Spray(s) Both Nostrils two times a day  trimethoprim  160 mG/sulfamethoxazole 800 mG 2 Tablet(s) Oral every 8 hours    MEDICATIONS  (PRN):  acetaminophen   Oral Liquid .. 650 milliGRAM(s) Oral every 6 hours PRN Temp greater or equal to 38C (100.4F), Mild Pain (1 - 3)  LORazepam   Injectable 2 milliGRAM(s) IV Push once PRN Seizure Activity (NOTIFY PROVIDER PRIOR TO GIVING)      Height for BMI (FEET)	5 Feet  Height for BMI (INCHES)	8 Inch(s)  Height for BMI (CM)	172.72 Centimeter(s)  Weight for BMI (lbs)	176.4 lb  Weight for BMI (kg)	80 kg  Body Mass Index	              26.8  ideal body weight:               154 pounds / 70 kg   % ideal body weight             114%       Weight Change: Recommend nursing to obtain weekly wt to track/trend changes.   RD obtained bedweight today (1/23/25) of ~79.5kg, consistent with admission weight. RD to follow up with nursing.   RD obtained bedweight today (2/4/25) of ~76.1kg, consistent with admission weight. RD to follow up with nursing.   RD obtained bedweight today (2/11/25) of ~76.2kg, not significantly decreased compared to admission wt.    Estimated energy needs:   Weight used for calculations: most recent actual body weight  weight: 76.1kg (~167%)  calories: 25-30 calories/kg = ~5115-7173 calories/day  protein: 1.1-1.4g protein/kg = ~84-107g protein/day  fluids: 1mL/kcal = ~1903-2283mL/day  Other Calculations	Pt is ~% ideal body weight (~109%), thus actual body weight used for all calculations. Needs adjusted for age, clinical course, ventilation.     Subjective:   This is a 44 year old male, unknown past medical history (reported stroke and MI by coworkers) presented to [facility] with AMS, Pt was working at [employer] when he was found down by coworkers. EMS called and pt brought to [facility] ED. Intubated, sedated, started on cardene for SBPs in 200s. CT head showed brain stem bleed. Transferred to NSICU for further management. Pt is status post tracheostomy 10/14. Pt is status post PEG on 10/21. Re-upgrade to ICU in the setting of desaturation event and suctioning requirements 11/15. Re-upgrade to NSICU 12/15 2/2 desaturation and tachycardia. Pt has since been stepped down to [address] [facility].     Patient seen at bedside on [address] [facility] for nutrition follow up. Pt afebrile, vented to VC-AC, not sedated, not on pressors, MAP 94. RD spoke to nursing on the unit, pt was resting in bed. Current diet order: NPO with tube feeds of [employer] Renal formula, at goal rate of 68 mL/hr for a total of 16 hrs with two 4hr breaks (providing ~1958kcal, 87g protein, 718mL free water), meeting 100% estimated nutrient needs. Diet order was changed from [employer] Standard to [employer] Renal formula in setting of abnormal renal labs and tube feed regimen changed yesterday (2/17) to accommodate pt starting new medication (minocycline) that requires feeds to be held for 2 hours before and after administration; patient will receive this medication 2x/day for total of 8 hours where feeds need to be held. No tube feeding tolerance issues noted. GI within normal limits at this time as per flowsheets, last BM 2/17 (2x, fecal incontinence); + bowel sounds. Labs and medications reviewed. Notable labs: BUN 50 <H>, Phos 4.8 <H>, POCT blood glucose 130 (2/15). Ordered for: IV antibiotics, miralax, senna, protonix, pain regimen, minocycline. RD will continue to follow, please see nutrition recommendations below.     Previous Nutrition Diagnosis: Inadequate Oral Intake related to inability to meet nutritional demands via PO as evidenced by NPO with tube feedings    Active [ x ]  Resolved [   ]     Goal: Pt will consume >/= 75% of estimated nutrient needs via tolerated route     Recommendations:  1. Continue [employer] renal via gastrostomy @68mL/hr for total of 16 hrs with two 4hr breaks (run feeds from 8am to 4pm and from 8pm to 4am) to provide 1088mL total volume, 1958kcal, 87g protein, 718mL free water meeting 26kcal/kg, 1.1g/kg protein based on recent body weight of 76.1kg obtained by RD.   **Provide Banatrol Tube Feeding prn**  **Free water flushes per team  **Maintain aspiration precautions at all times; monitor for signs/symptoms of intolerance**  2. Monitor weights (weekly), GI function, skin integrity; electrolytes, BMP, glucose, CBC per MD discretion *renal indices*  3. Pain and bowel regimen per medical team  4. Align nutrition with goals of care at all times  5. Recommend nursing to obtain new weights to track/trend changes    Education: Deferred at this time related to pt resting in bed, and cognitive status.     Risk Level: High [  ] Moderate [ x ] Low [   ]

## 2025-02-18 NOTE — PROGRESS NOTE ADULT - ASSESSMENT
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  He is undocumented so they can’t place him.  He is s/p trach/PEG s/p multiple antibiotic courses for presumed pneumonia (including Acinetobacter), Klebsiella aerogenes bacteremia (source not identified), respiratory tract colonization by Stenotrophomonas, Klebsiella aerogenes VAP in Dec (resolved without treating Steno).  He was treated with pip-tazo from 1/12-1/30 for fever, presumed pneumonia.  CXR showed R basilar atelectasis, sputum culture grew mixed GNRs and NRF.  He was treated with levofloxacin from 1/30-2/3 for UTI, urine culture grew >10^5 Klebsiella aerogenes. On 2/13, he underwent bronch in prep for trach exchange that he subsequently did not tolerate. Culture from bronch grew Stenotrophomonas, with which he has been colonized for months.  Episodes of desaturation following trach exchange on 2/14 CT chest on 1/17 showed near complete collapse both lower lobes and patchy opacity within the aerated left lower lobe as well as the left upper lobes may be infectious or inflammatory.  He was started on TMP/SX and minocycline on 2/17 based upon bronch culture from 2/13.  Sputum culture from 2/16 grew Enterobacter cloacae complex in addition to the Stenotrophomonas.  Suspect Enterobacter is the culprit but will wait for susceptibilities prior to recommending change.  Suggest:  - F/U blood cultures from 2/17  - F/U tracheal aspirate culture from 2/16 for susceptibilities  - Continue TMP/SX 2DS via PEG q8h  - Continue minocycline 200 mg po q12h via PEG  Will follow with you, team 2.

## 2025-02-18 NOTE — PROGRESS NOTE ADULT - SUBJECTIVE AND OBJECTIVE BOX
Patient was seen and examined at bedside. Case discuss with the primary team. Pt without events overnight.   nursing staff report no new episodes of seizure like activity , secretions from the Tracheostomy better then 2/17     OBJECTIVE:  Vital Signs Last 24 Hrs  T(C): 36.2 (18 Feb 2025 08:51), Max: 36.9 (17 Feb 2025 22:34)  T(F): 97.2 (18 Feb 2025 08:51), Max: 98.4 (17 Feb 2025 22:34)  HR: 74 (18 Feb 2025 13:00) (62 - 82)  BP: 132/91 (18 Feb 2025 13:00) (100/69 - 132/91)  BP(mean): 107 (18 Feb 2025 13:00) (79 - 107)  RR: 19 (18 Feb 2025 13:00) (14 - 19)  SpO2: 99% (18 Feb 2025 13:00) (95% - 100%)    Parameters below as of 18 Feb 2025 13:00  Patient On (Oxygen Delivery Method): ventilator    O2 Concentration (%): 40      PHYSICAL EXAM:  Gen: Reclining in bed at time of exam; Pt was on EEG at time of evaluation; mild facial twitching   HEENT: trach in place   CV: RRR, +S1/S2  Pulm: adequate respiratory effort, no increase in work of breathing  Abd: soft, ND  Skin: warm and dry,   Neuro: awake, does not talk, opening eyes to voice     LABS:                                   10.2   6.30  )-----------( 239      ( 18 Feb 2025 05:30 )             32.2     02-18    136  |  100  |  50[H]  ----------------------------<  102[H]  4.5   |  26  |  1.27    Ca    9.6      18 Feb 2025 05:30  Phos  4.8     02-18  Mg     2.3     02-18 2/16 CT chest w/o contrast: Near complete collapse both lower lobes. Patchy opacity within the   aerated left lower lobe as well as the left upper lobes may be infectious   or inflammatory.    2/13 BAL cx: stenotrophomonas   2/16 RVP/COVID: negative     MEDICATIONS  (STANDING):  albuterol/ipratropium for Nebulization 3 milliLiter(s) Nebulizer every 4 hours  amLODIPine   Tablet 5 milliGRAM(s) Oral daily  artificial tears (preservative free) Ophthalmic Solution 1 Drop(s) Right EYE every 4 hours  baclofen 5 milliGRAM(s) Oral every 12 hours  chlorhexidine 0.12% Liquid 15 milliLiter(s) Oral Mucosa every 12 hours  doxazosin 8 milliGRAM(s) Oral at bedtime  erythromycin   Ointment 1 Application(s) Both EYES four times a day  fluticasone propionate 50 MICROgram(s)/spray Nasal Spray 1 Spray(s) Both Nostrils two times a day  heparin   Injectable 5000 Unit(s) SubCutaneous every 8 hours  influenza   Vaccine 0.5 milliLiter(s) IntraMuscular once  levETIRAcetam 1500 milliGRAM(s) Oral every 12 hours  LORazepam     Tablet 1 milliGRAM(s) Oral three times a day  minocycline 200 milliGRAM(s) Oral <User Schedule>  pantoprazole   Suspension 40 milliGRAM(s) Oral daily  petrolatum Ophthalmic Ointment 1 Application(s) Both EYES four times a day  phenytoin   Suspension 150 milliGRAM(s) Oral <User Schedule>  phenytoin   Suspension 200 milliGRAM(s) Oral <User Schedule>  polyethylene glycol 3350 17 Gram(s) Oral daily  propranolol 5 milliGRAM(s) Oral every 12 hours  senna 2 Tablet(s) Oral at bedtime  sodium chloride 0.65% Nasal 1 Spray(s) Both Nostrils two times a day  trimethoprim  160 mG/sulfamethoxazole 800 mG 2 Tablet(s) Oral every 8 hours    MEDICATIONS  (PRN):  acetaminophen   Oral Liquid .. 650 milliGRAM(s) Oral every 6 hours PRN Temp greater or equal to 38C (100.4F), Mild Pain (1 - 3)  LORazepam   Injectable 2 milliGRAM(s) IV Push once PRN Seizure Activity (NOTIFY PROVIDER PRIOR TO GIVING)

## 2025-02-19 LAB
-  AMOXICILLIN/CLAVULANIC ACID: SIGNIFICANT CHANGE UP
-  AMPICILLIN/SULBACTAM: SIGNIFICANT CHANGE UP
-  AMPICILLIN: SIGNIFICANT CHANGE UP
-  CEFAZOLIN: SIGNIFICANT CHANGE UP
-  CEFEPIME: SIGNIFICANT CHANGE UP
-  CEFOXITIN: SIGNIFICANT CHANGE UP
-  CEFTRIAXONE: SIGNIFICANT CHANGE UP
-  CIPROFLOXACIN: SIGNIFICANT CHANGE UP
-  GENTAMICIN: SIGNIFICANT CHANGE UP
-  LEVOFLOXACIN: SIGNIFICANT CHANGE UP
-  LEVOFLOXACIN: SIGNIFICANT CHANGE UP
-  PIPERACILLIN/TAZOBACTAM: SIGNIFICANT CHANGE UP
-  TOBRAMYCIN: SIGNIFICANT CHANGE UP
-  TRIMETHOPRIM/SULFAMETHOXAZOLE: SIGNIFICANT CHANGE UP
-  TRIMETHOPRIM/SULFAMETHOXAZOLE: SIGNIFICANT CHANGE UP
ADD ON TEST-SPECIMEN IN LAB: SIGNIFICANT CHANGE UP
ANION GAP SERPL CALC-SCNC: 12 MMOL/L — SIGNIFICANT CHANGE UP (ref 5–17)
BUN SERPL-MCNC: 51 MG/DL — HIGH (ref 7–23)
CALCIUM SERPL-MCNC: 9.2 MG/DL — SIGNIFICANT CHANGE UP (ref 8.4–10.5)
CHLORIDE SERPL-SCNC: 97 MMOL/L — SIGNIFICANT CHANGE UP (ref 96–108)
CO2 SERPL-SCNC: 23 MMOL/L — SIGNIFICANT CHANGE UP (ref 22–31)
CREAT SERPL-MCNC: 1.43 MG/DL — HIGH (ref 0.5–1.3)
CULTURE RESULTS: ABNORMAL
CULTURE RESULTS: ABNORMAL
EGFR: 61 ML/MIN/1.73M2 — SIGNIFICANT CHANGE UP
EGFR: 61 ML/MIN/1.73M2 — SIGNIFICANT CHANGE UP
GLUCOSE SERPL-MCNC: 98 MG/DL — SIGNIFICANT CHANGE UP (ref 70–99)
HCT VFR BLD CALC: 32.5 % — LOW (ref 39–50)
HGB BLD-MCNC: 10.6 G/DL — LOW (ref 13–17)
MAGNESIUM SERPL-MCNC: 2.2 MG/DL — SIGNIFICANT CHANGE UP (ref 1.6–2.6)
MCHC RBC-ENTMCNC: 31.6 PG — SIGNIFICANT CHANGE UP (ref 27–34)
MCHC RBC-ENTMCNC: 32.6 G/DL — SIGNIFICANT CHANGE UP (ref 32–36)
MCV RBC AUTO: 97 FL — SIGNIFICANT CHANGE UP (ref 80–100)
METHOD TYPE: SIGNIFICANT CHANGE UP
METHOD TYPE: SIGNIFICANT CHANGE UP
NRBC BLD AUTO-RTO: 0 /100 WBCS — SIGNIFICANT CHANGE UP (ref 0–0)
ORGANISM # SPEC MICROSCOPIC CNT: ABNORMAL
ORGANISM # SPEC MICROSCOPIC CNT: ABNORMAL
ORGANISM # SPEC MICROSCOPIC CNT: SIGNIFICANT CHANGE UP
PHOSPHATE SERPL-MCNC: 4.9 MG/DL — HIGH (ref 2.5–4.5)
PLATELET # BLD AUTO: 242 K/UL — SIGNIFICANT CHANGE UP (ref 150–400)
POTASSIUM SERPL-MCNC: 4.6 MMOL/L — SIGNIFICANT CHANGE UP (ref 3.5–5.3)
POTASSIUM SERPL-SCNC: 4.6 MMOL/L — SIGNIFICANT CHANGE UP (ref 3.5–5.3)
RBC # BLD: 3.35 M/UL — LOW (ref 4.2–5.8)
RBC # FLD: 12.3 % — SIGNIFICANT CHANGE UP (ref 10.3–14.5)
SODIUM SERPL-SCNC: 132 MMOL/L — LOW (ref 135–145)
SPECIMEN SOURCE: SIGNIFICANT CHANGE UP
WBC # BLD: 6.99 K/UL — SIGNIFICANT CHANGE UP (ref 3.8–10.5)
WBC # FLD AUTO: 6.99 K/UL — SIGNIFICANT CHANGE UP (ref 3.8–10.5)

## 2025-02-19 PROCEDURE — 99231 SBSQ HOSP IP/OBS SF/LOW 25: CPT

## 2025-02-19 PROCEDURE — G0545: CPT

## 2025-02-19 PROCEDURE — 99232 SBSQ HOSP IP/OBS MODERATE 35: CPT

## 2025-02-19 RX ORDER — ERTAPENEM SODIUM 1 G/1
1000 INJECTION, POWDER, LYOPHILIZED, FOR SOLUTION INTRAMUSCULAR; INTRAVENOUS EVERY 24 HOURS
Refills: 0 | Status: DISCONTINUED | OUTPATIENT
Start: 2025-02-19 | End: 2025-02-24

## 2025-02-19 RX ORDER — HYPROMELLOSE 0.4 %
1 DROPS OPHTHALMIC (EYE) EVERY 4 HOURS
Refills: 0 | Status: DISCONTINUED | OUTPATIENT
Start: 2025-02-19 | End: 2025-03-13

## 2025-02-19 RX ADMIN — Medication 1 MILLIGRAM(S): at 00:38

## 2025-02-19 RX ADMIN — LEVETIRACETAM 1500 MILLIGRAM(S): 10 INJECTION, SOLUTION INTRAVENOUS at 05:06

## 2025-02-19 RX ADMIN — Medication 1 DROP(S): at 06:16

## 2025-02-19 RX ADMIN — IPRATROPIUM BROMIDE AND ALBUTEROL SULFATE 3 MILLILITER(S): .5; 2.5 SOLUTION RESPIRATORY (INHALATION) at 21:13

## 2025-02-19 RX ADMIN — Medication 1 DROP(S): at 18:00

## 2025-02-19 RX ADMIN — Medication 1 DROP(S): at 01:10

## 2025-02-19 RX ADMIN — Medication 1 DROP(S): at 09:20

## 2025-02-19 RX ADMIN — Medication 1 APPLICATION(S): at 06:16

## 2025-02-19 RX ADMIN — Medication 1 DROP(S): at 13:54

## 2025-02-19 RX ADMIN — Medication 2 TABLET(S): at 21:12

## 2025-02-19 RX ADMIN — Medication 15 MILLILITER(S): at 17:58

## 2025-02-19 RX ADMIN — IPRATROPIUM BROMIDE AND ALBUTEROL SULFATE 3 MILLILITER(S): .5; 2.5 SOLUTION RESPIRATORY (INHALATION) at 17:58

## 2025-02-19 RX ADMIN — Medication 40 MILLIGRAM(S): at 11:11

## 2025-02-19 RX ADMIN — IPRATROPIUM BROMIDE AND ALBUTEROL SULFATE 3 MILLILITER(S): .5; 2.5 SOLUTION RESPIRATORY (INHALATION) at 09:20

## 2025-02-19 RX ADMIN — FLUTICASONE PROPIONATE 1 SPRAY(S): 50 SPRAY, METERED NASAL at 05:10

## 2025-02-19 RX ADMIN — IPRATROPIUM BROMIDE AND ALBUTEROL SULFATE 3 MILLILITER(S): .5; 2.5 SOLUTION RESPIRATORY (INHALATION) at 01:06

## 2025-02-19 RX ADMIN — Medication 1 SPRAY(S): at 18:01

## 2025-02-19 RX ADMIN — Medication 1 APPLICATION(S): at 11:13

## 2025-02-19 RX ADMIN — POLYETHYLENE GLYCOL 3350 17 GRAM(S): 17 POWDER, FOR SOLUTION ORAL at 11:11

## 2025-02-19 RX ADMIN — HEPARIN SODIUM 5000 UNIT(S): 1000 INJECTION INTRAVENOUS; SUBCUTANEOUS at 13:49

## 2025-02-19 RX ADMIN — Medication 1 SPRAY(S): at 05:09

## 2025-02-19 RX ADMIN — Medication 1 APPLICATION(S): at 18:00

## 2025-02-19 RX ADMIN — BACLOFEN 5 MILLIGRAM(S): 10 INJECTION INTRATHECAL at 05:07

## 2025-02-19 RX ADMIN — BACLOFEN 5 MILLIGRAM(S): 10 INJECTION INTRATHECAL at 17:56

## 2025-02-19 RX ADMIN — IPRATROPIUM BROMIDE AND ALBUTEROL SULFATE 3 MILLILITER(S): .5; 2.5 SOLUTION RESPIRATORY (INHALATION) at 05:11

## 2025-02-19 RX ADMIN — Medication 150 MILLIGRAM(S): at 05:03

## 2025-02-19 RX ADMIN — Medication 100 MILLILITER(S): at 11:14

## 2025-02-19 RX ADMIN — IPRATROPIUM BROMIDE AND ALBUTEROL SULFATE 3 MILLILITER(S): .5; 2.5 SOLUTION RESPIRATORY (INHALATION) at 13:47

## 2025-02-19 RX ADMIN — ERYTHROMYCIN 1 APPLICATION(S): 5 OINTMENT OPHTHALMIC at 17:58

## 2025-02-19 RX ADMIN — ERTAPENEM SODIUM 120 MILLIGRAM(S): 1 INJECTION, POWDER, LYOPHILIZED, FOR SOLUTION INTRAMUSCULAR; INTRAVENOUS at 15:48

## 2025-02-19 RX ADMIN — Medication 2 TABLET(S): at 05:10

## 2025-02-19 RX ADMIN — ERYTHROMYCIN 1 APPLICATION(S): 5 OINTMENT OPHTHALMIC at 11:11

## 2025-02-19 RX ADMIN — Medication 150 MILLIGRAM(S): at 11:12

## 2025-02-19 RX ADMIN — Medication 200 MILLIGRAM(S): at 05:05

## 2025-02-19 RX ADMIN — LEVETIRACETAM 1500 MILLIGRAM(S): 10 INJECTION, SOLUTION INTRAVENOUS at 17:56

## 2025-02-19 RX ADMIN — ERYTHROMYCIN 1 APPLICATION(S): 5 OINTMENT OPHTHALMIC at 05:10

## 2025-02-19 RX ADMIN — DOXAZOSIN MESYLATE 8 MILLIGRAM(S): 8 TABLET ORAL at 21:13

## 2025-02-19 RX ADMIN — HEPARIN SODIUM 5000 UNIT(S): 1000 INJECTION INTRAVENOUS; SUBCUTANEOUS at 21:14

## 2025-02-19 RX ADMIN — Medication 200 MILLIGRAM(S): at 19:14

## 2025-02-19 RX ADMIN — AMLODIPINE BESYLATE 5 MILLIGRAM(S): 10 TABLET ORAL at 05:05

## 2025-02-19 RX ADMIN — HEPARIN SODIUM 5000 UNIT(S): 1000 INJECTION INTRAVENOUS; SUBCUTANEOUS at 05:13

## 2025-02-19 RX ADMIN — Medication 1 APPLICATION(S): at 23:55

## 2025-02-19 RX ADMIN — FLUTICASONE PROPIONATE 1 SPRAY(S): 50 SPRAY, METERED NASAL at 18:01

## 2025-02-19 RX ADMIN — Medication 15 MILLILITER(S): at 05:09

## 2025-02-19 RX ADMIN — Medication 1 MILLIGRAM(S): at 13:49

## 2025-02-19 RX ADMIN — Medication 1 DROP(S): at 21:13

## 2025-02-19 NOTE — CHART NOTE - NSCHARTNOTEFT_GEN_A_CORE
Nurse informed PA of change in mental status compared to AM, was told patient was obtunded and only responding to noxious stimulus. Pt was examined at bedside, opened eyes with trap stim and maintained eye opening after. Squeezed R hand to command. Facial twitching R>L remains unchanged. Touched base with nurse, will continue to watch and get new temperature to r/o fever.

## 2025-02-19 NOTE — PROGRESS NOTE ADULT - SUBJECTIVE AND OBJECTIVE BOX
SUBJECTIVE :   He is non verbal due ot SAH   Nursing staff report no events overnight   awake and  Oriented       OBJECTIVE:  Vital Signs Last 24 Hrs  T(C): 36.9 (19 Feb 2025 08:55), Max: 37.1 (19 Feb 2025 04:41)  T(F): 98.4 (19 Feb 2025 08:55), Max: 98.8 (19 Feb 2025 04:41)  HR: 76 (19 Feb 2025 08:40) (68 - 86)  BP: 135/87 (19 Feb 2025 08:21) (108/71 - 135/87)  BP(mean): 107 (19 Feb 2025 08:21) (85 - 107)  RR: 14 (19 Feb 2025 08:40) (14 - 18)  SpO2: 100% (19 Feb 2025 08:40) (94% - 100%)    Parameters below as of 19 Feb 2025 08:40  Patient On (Oxygen Delivery Method): ventilator    O2 Concentration (%): 40      PHYSICAL EXAM:  Gen: Reclining in bed at time of exam; Pt was on EEG at time of evaluation; mild facial twitching   HEENT: trach in place   CV: RRR, +S1/S2  Pulm: adequate respiratory effort, no increase in work of breathing  Abd: soft, ND  Skin: warm and dry,   Neuro: awake, does not talk, opening eyes to voice     LABS:                                               10.6   6.99  )-----------( 242      ( 19 Feb 2025 08:12 )             32.5     02-19    132[L]  |  97  |  51[H]  ----------------------------<  98  4.6   |  23  |  1.43[H]    Ca    9.2      19 Feb 2025 08:12  Phos  4.9     02-19  Mg     2.2     02-19 2/16 CT chest w/o contrast: Near complete collapse both lower lobes. Patchy opacity within the   aerated left lower lobe as well as the left upper lobes may be infectious   or inflammatory.    2/13 BAL cx: stenotrophomonas   2/16 RVP/COVID: negative     MEDICATIONS  (STANDING):  albuterol/ipratropium for Nebulization 3 milliLiter(s) Nebulizer every 4 hours  amLODIPine   Tablet 5 milliGRAM(s) Oral daily  artificial tears (preservative free) Ophthalmic Solution 1 Drop(s) Right EYE every 4 hours  baclofen 5 milliGRAM(s) Oral every 12 hours  chlorhexidine 0.12% Liquid 15 milliLiter(s) Oral Mucosa every 12 hours  doxazosin 8 milliGRAM(s) Oral at bedtime  erythromycin   Ointment 1 Application(s) Both EYES four times a day  fluticasone propionate 50 MICROgram(s)/spray Nasal Spray 1 Spray(s) Both Nostrils two times a day  heparin   Injectable 5000 Unit(s) SubCutaneous every 8 hours  influenza   Vaccine 0.5 milliLiter(s) IntraMuscular once  levETIRAcetam 1500 milliGRAM(s) Oral every 12 hours  LORazepam     Tablet 1 milliGRAM(s) Oral three times a day  minocycline 200 milliGRAM(s) Oral <User Schedule>  pantoprazole   Suspension 40 milliGRAM(s) Oral daily  petrolatum Ophthalmic Ointment 1 Application(s) Both EYES four times a day  phenytoin   Suspension 150 milliGRAM(s) Oral <User Schedule>  phenytoin   Suspension 200 milliGRAM(s) Oral <User Schedule>  polyethylene glycol 3350 17 Gram(s) Oral daily  propranolol 5 milliGRAM(s) Oral every 12 hours  senna 2 Tablet(s) Oral at bedtime  sodium chloride 0.65% Nasal 1 Spray(s) Both Nostrils two times a day  trimethoprim  160 mG/sulfamethoxazole 800 mG 2 Tablet(s) Oral every 8 hours    MEDICATIONS  (PRN):  acetaminophen   Oral Liquid .. 650 milliGRAM(s) Oral every 6 hours PRN Temp greater or equal to 38C (100.4F), Mild Pain (1 - 3)  LORazepam   Injectable 2 milliGRAM(s) IV Push once PRN Seizure Activity (NOTIFY PROVIDER PRIOR TO GIVING)

## 2025-02-19 NOTE — PROGRESS NOTE ADULT - SUBJECTIVE AND OBJECTIVE BOX
INTERVAL HPI/OVERNIGHT EVENTS:  Tm 98.8    ROS:  Unobtainable - locked in      ANTIBIOTICS/RELEVANT:          Vital Signs Last 24 Hrs  T(C): 36.4 (19 Feb 2025 16:48), Max: 37.1 (19 Feb 2025 04:41)  T(F): 97.5 (19 Feb 2025 16:48), Max: 98.8 (19 Feb 2025 04:41)  HR: 96 (19 Feb 2025 17:56) (68 - 96)  BP: 137/89 (19 Feb 2025 17:56) (108/71 - 137/89)  BP(mean): 108 (19 Feb 2025 17:56) (85 - 108)  RR: 14 (19 Feb 2025 17:56) (14 - 15)  SpO2: 100% (19 Feb 2025 17:56) (94% - 100%)    Parameters below as of 19 Feb 2025 17:56  Patient On (Oxygen Delivery Method): ventilator    O2 Concentration (%): 40    PHYSICAL EXAM:  Constitutional:  Somnolent  HEENT:  NC, ointment in eyes, PERRL.  No nasal exudate or sinus tenderness;  Unable to visualize pharynx.  Sustained facial twitching  Neck:  +trach  Respiratory:  Clear bilaterally anteriorly  Cardiovascular:  RRR, S1S2, no murmur appreciated  Gastrointestinal:  +PEG, normoactive BS, soft, NT, no masses, guarding or rebound.  No HSM  Extremities:  No edema      LABS:                        10.6   6.99  )-----------( 242      ( 19 Feb 2025 08:12 )             32.5         02-19    132[L]  |  97  |  51[H]  ----------------------------<  98  4.6   |  23  |  1.43[H]    Ca    9.2      19 Feb 2025 08:12  Phos  4.9     02-19  Mg     2.2     02-19    TPro  7.3  /  Alb  3.6  /  TBili  <0.2  /  DBili  <0.1  /  AST  18  /  ALT  15  /  AlkPhos  181[H]  02-19      Urinalysis Basic - ( 19 Feb 2025 08:12 )    Color: x / Appearance: x / SG: x / pH: x  Gluc: 98 mg/dL / Ketone: x  / Bili: x / Urobili: x   Blood: x / Protein: x / Nitrite: x   Leuk Esterase: x / RBC: x / WBC x   Sq Epi: x / Non Sq Epi: x / Bacteria: x        MICROBIOLOGY:        RADIOLOGY & ADDITIONAL STUDIES:

## 2025-02-19 NOTE — PROGRESS NOTE ADULT - SUBJECTIVE AND OBJECTIVE BOX
HPI:  Unknown age male, unknown past medical history (reported stroke and MI by coworkers) presented to [facility] with AMS, Pt was working at [employer] when he was found down by coworkers. EMS called and pt brought to [facility] ED. Intubated, sedated, started on cardene for SBPs in 200s. CT head showed brain stem bleed. Transferred to NSICU for further management.  (30 Sep 2024 12:55)    OVERNIGHT EVENTS: GEORGE overnight, neuro stable.     Hospital Course:   11/10: BD 41. GEORGE overnight. desat to 85 on trach collar, O2 inc to 10L and 100%, O2 sat inc to 95. pt tachy to 110s, euvolemic. given tylenol. ABG and CXR ordered. spiked fever, pancultured, RVP negative. AM ABG w pO2 79, rpt w pO2 79. pt appears comfortable, satting 94%.   11/11: BD 42. GEORGE overnight. pt became tachy to 130s, desat to 90 on 100% FiO2 and 10L. suctioned, (+) productive cough. temp 101.4, given 1g IV tylenol and 500cc NS bolus for euvolemia. fever and HR downtrending. LE dopplers negative for dvt  11/12: BD 43, GEORGE ovn, fever and HR downtrending, satting 97% 70% FIO2  11/13: BD 44, GEORGE ovn. started standing tylenol x24 hours for tachycardia. desat to 80s, o2 increased. CXR stable, pending CTA PE protocol.   11/14: BD 45, GEORGE overnight, neuro stable. resp therapy dec FiO2 to 70%.   11/15: BD 46, GEORGE overnight, neuro stable.  Rapid called for desaturation 30s, tachycardic 140s. Patient bagged, 100% fio2, heavily suctioned. CXR/POCUS unremarkable. ABG c/w desaturation. WBC 14.71. Afebrile. O2 improved to 90s and patient upgraded to ICU. ABG paO2 30s improved to 89 on vent. IV Tylenol x 1, sputum sent. Start protonix while o-n vent.   11/16: BD 47. POCUS showed collapsable IVCF, given 1L bolus. Vanco/Cefpime added empriic for PNA, NGT feeds restarted. MRSA swab neg, Vanc DC'd.   11/17: BD 48. GEORGE overnight. 1l bolus for tachycardia. Spiked to 101, cultured. 500cc bolus for tachycardia, tachy to 148 given 25mcg fentanyl, 250cc albumin, 1.5L bolus. 5 IV lopressor with response HR to 100s. +Stenotrophomonas on sputum cx.   11/18: BD 49. GEORGE overnight. Consulted ID, cefepime switched to bactrim x7days. Started hydrochlorothiazine 12.5mg daily.  11/19: BD 50. Tachy 120s, given tylenol and 500cc NS. Tolerating 5/5, switched to TCx. Holding phos binder. D/c Bactrim. D/c gabriel, f/u TOV. Dc'd PPI.   11/20: BD 51. GEORGE ovn. 1600 satting low 90s, mildly tachy to 110s, afebrile, RR wnl. O2 improved to mid 90s while inc O2 to 100% on TCx. CPAP 5/5 placed back on.  11/21: BD 52, GEORGE ovn, tolerating CPAP 5/5. Switch to trach collar during the day if tolerating well. HCTZ held for Cr bump, straight cath frequence increased to q4  11/22: BD 53, GEORGE ovn. Resumed phoslo. Gabriel placed. Resumed HCTZ.   11/23: BD 54. Holding tylenol in setting of possible fever, will require pan cx if febrile. Cr improved today. Cont CPAP. Bowel regimen held i/s/o diarrhea. FOBT negative.  11/24: BD 55. GEORGE overnight. Neuro stable. HCTZ dc'ed, started lisinopril 5. Lokelma dc'ed for K 3.7.   11/25: BD 56, GEORGE overnight. Neuro stable. dc'd lisinopril 5mg. Gabriel dc'd. TOV. 1545 noted to be hypotensive, MAP 50, in supine position on chair, HR 60s, afebrile, O2 96%. Given 1L cc NS bolus, placed back on bed in reverse trendelenberg, improved to map of 66. Neostick at bedside. Vitals check q1h. Dc'ed amlodipine. Failed TOV, bladder scan q6, sc prn. Added back Senna.   11/26: BD 57, GEORGE overnight. Neuro stable. Dc'd phoslo.   11/27: BD 58, GEORGE overnight. Neuro stable.    11/28: BD 59. Gabriel replaced.   11/29: GEORGE.  11/30: GEORGE, neuro stable.   12/1: BD 62, GEORGE overnight  12/2: BD 63, GEORGE overnight.; Given 1L bolus of LR for uptrending BUN/Cr.  12/3: BD 64. Reinstated eye gtt/moisture chamber given increased Rt eye injection  12/4: BD 65. GEORGE overnight. Attempted to speak with ophtho regarding eyelid weight/closure but no answer, full mailbox.   12/5: BD 66, GEORGE overnight, bowel regimen increased and had BM.   12/6: BD 67, GEORGE overnight, neuro stable.  12/7: GEORGE overnight, neuro stable. /110s, given x1 hydralazine 10 mg IVP. Restarted home amlodipine 5mg.  12/8: GEORGE. OOB to chair.     12/11: GEORGE, mucomyst added for thick secretions, simethicone for abd distension, abd xray with stool burden, increased bowel regimen.   12/12: GEORGE overnight.   12/13: GEORGE overnight.   12/14: GEORGE overnight  12/15: o/n Patient became tachycardic HR 120s and 10 minutes later O2 sat dropped as low as 89%. Patient suctioned without improvement in O2 sat and tube feeds found in suction catheter. TFs held.  STAT CXR ordered. STAT labs sent. Respiratory therapist called to bedside and patient trach connected to ventilator. After connection to ventilator and further suctioning O2 sat improved to 97% but patient HR remains 120-130s. Upgraded to NSICU for further management. Vancomycin and zosyn started. CTA  chest PE protocol and CTH ordered. Blood cultures sent.given 500cc bolus, rpt ABG sent pO2 243, CTH and CTA chest done. FS while NPO, FiO2 dec 50 pending ABG. sputum cx positive for few GPC and GNR.   12/16: GEORGE overnight, restarted amlodipine, troponin 75   12/17: GEORGE overnight, neuro stable. Attempted CPAP this morning, did not tolerate and back on full vent support. Dc'd Vanc. Tachycardia to 140s, noted extremities to be twitching along with jaw twitching. Given 2g of Keppra, total 4mg ativan and placed on EEG, full set of labs and lactate negative. Resumed trickle feeds at 20cc/hr. Given 250cc albumin for tachycardia.   12/18: GEORGE overnight. Neuro stable. CTH stable. EEG negative and dc'd.   12/19: GEORGE overnight. neuro stable. SIMV most of day, AC/VC at night.   12/20: GEORGE overnight, neuro stable. remains on VC/AC  12/21: GEORGE ovn. 1L bolus for tachy to 120s-130s.   12/22: GEORGE ovn. Tachy to 120s, given tylenol and IVF. 2mg IV ativan given for L jaw twitching. Sx resolved for 3 minutes and started again. Epilepsy contacted. Given 2mg IV ativan. Continued twitching. Increased keppra to 1500mg BID, 2mg ativan given. Propranolol started for refractory tachycardia. vEEG ordered. albumin given. POCUS performed, no b lines. Started on fosphenytoin, loaded w 20mg/kg then 100mg TID. febrile, pancx, started cefepime 2g q8, vancomycin  12/23: GEORGE ovn. +focal motor seizures on eeg. ID consulted. Vancomycin dc'ed as per ID.  12/24: GEORGE ovn, neuro stable. Baclofen 5 mg q12 started for hiccups. Na 129 from 134. Urine lytes c/w SIADH. Given 250cc 3% bolus. CT chest for infection w/u - f/u read. Repeat Na 136. UA negative  12/25: GEORGE ovn.   12/26: GEORGE ovn  12/27: GEORGE overnight. Blood cx neg @ 4 days, DC cefepime per medicine (sputum colonized).   12/28: Desaturation o/n to 80's, CXR obtained, pulse ox changed and sats resolved. Na 133 from 138, 250cc 3% bolus given. Cefepime resumed until 12/30 per ID. Rpt Na 138.  12/29: GEORGE overnight. Na stable.   12/30: GEORGE overnight. Family meeting with son, pt now DNR.   12/31: GEORGE overnight.   1/1: GEORGE overnight, neuro stable.  1/2: GEORGE overnight, neuro stable. FW decreased to 100q6.   1/3: GEORGE overnight, neuro stable. fosphenytoin IV changed to pheytoin PO via PEG per epilepsy recommendations  1/4: GEORGE overnight, neuro stable. FW incr. to 100q4  1/5: GEORGE overnight, neuro stable.   1/6: GEORGE overnight, neuro stable   1/7: GEORGE overnight, neuro stable. BUN/Cr increased, increased FW to 200q6. Given 1L bolus of LR over 2 hours. Gabriel d/c'd, voiding, continue bladder scan q6.   1/8: GEORGE overnight, neuro stable. BUN/Cr improving.  1/9: GEORGE overnight, neuro stable.   1/10: GEORGE overnight, neuro stable.   1/11: GEORGE overnight, neuro stable, vent settings stable.   1/12: GEORGE overnight, neuro stable. Febrile to 102F, f/u pan cx, chest xray, given tylenol. Zosyn started.   1/13: GEORGE overnight, neuro stable, cont Zosyn for presumed PNA, prelim sputum cx growing; few GS neg diplococci, mod GS neg rods, rare GS + rods, fever trend improved. Free water increased to [id number].   1/14: GEORGE overnight. pending renal US for elevated Cr  1/15: GEORGE ovn. renal US complete.   1/16: GEORGE overnight, neuro stable   1/17: GEORGE overnight, neuro stable   1/18: GEORGE overnight, neuro stable.   1/19: GEORGE overnight, neuro stable. Propranolol dec to 5q8.   1/20: GEORGE overnight, neuro stable. Weaned propranolol to 5mg BID.   1/21: GEORGE ovn, in AM having rapid vertical eye movements with facial twitching, 2g total keppra given for AM dose and phenytoin level ordered, vital signs stable. CTH stable. Phenytoin increased to 100/100/150mg per epilepsy  1/22: GEORGE ovn. IV hydral x 1 for SBP 170s.   1/23: Cont to have focal motor seizures. Per epilepsy, changed phenytoin dose to 100/100/200.   1/24: Phenytoin lvl tmrw AM. Chest Xray completed due to temp 100.2F  1/25: GEORGE overnight. Incr dilantin morning and afternoon per epilepsy.   1/26: GEORGE overnight. Sustained focal twitching noticed by nursing. Ativan 8mg in total given. Fosphenytoin 1500mg IV given. Placed on EEG. Epilepsy following. TFs held. Phenytoin increased to 150/150/200.   1/27: o/n CTH completed due to continued lethargy after ativan given yesterday afternoon. TFs resumed. 500cc bolus NS given for SBP 90s. EEG dc'ed, discussed w/ Dr. Tomlin. Febrile 101F, pan cx sent. Likely left sided infiltrate on CXR, c/f aspiration PNA. Started on vanc/zosyn. MRSA swab pending.   1/28: Neuro stable, on vanc/zosyn. +UTI on UA, MRSA negative. Vanc dc'd. RVP negative. Zosyn increased to pseudomonal dosing.   1/29: GEORGE overnight, neuro stable.  1/30: GEORGE overnight, neuro stable. Dc'd zosyn due to resistance, switched to Levaquin.  1/31: GEORGE ovenight, neuro stable.   2/1: Brief desat. Improved with neb and reposititioning. ABG and POCUS wnl.   2/4: GEORGE overnight  2/5: GEORGE overnight  2/6: GEORGE ovn  2/7: GEORGE ovn. L eye redness noted, started erythromycin and lacrilube to L eye as well. LR @ 100 cc/hr x 10 hours for uptrending BUN/Cr. Resumed bowel reg.   2/8: GEORGE overnight. Escalated bowel regimen.   2/9: GEORGE overnight.  2/10: GEORGE overnight. Social work to start working on SNF placement. Pending appointment with [employer] [place] [employer] to come to hospital for biometrics.  2/11: GEORGE overnight. Neuro stable. Potassium improved (downtrending).   2/12: GEORGE overnight. Neuro stable. Lokelma 5mg x 1. Decrease FW to 200q8. 1L NS bolus given for hydration, uptrending BUN/Cr. Wound care rec barrier wipes for penile wound.  TFs changed to [facility] renal formula per nutrition.  2/13: GEORGE overnight. Neuro stable. Corneal abrasion noted on exam, ophtho re-consulted for L eye, eye drops changed per recs to q4. Pulm contacted for trach exchange, attempted at bedside but patient unable to tolerate with minimal sedation, will plan for tomorrow.   2/14: GEORGE overnight, given 1L of NS for low BP after fentanyl with improvement. Neuro exam stable, pend trach exchange today with pulm. Pend optho assessment for left eye. Trache exchanged. Desaturation during placement to 90%. FiO2 incr'd 55%. CXR negative for pneumothorax.   2/15: GEORGE overnight. Dest'd 88%: suctione, incr'd FiO2, PEEP. CXR/ABG/POCUS done with B-lines, 20mg IV lasix given.   2/16: GEORGE ovn, BAL from 2/13 during trach exchange growing moderate stenotrophomonas maltophilia, desat to 87% red rubber suctioning performed with improvements in O2 sat to 98%, Desturated again to 76%. Deep suctioned, ABG/CXR and lasix, incr. vent settings, Saturating 95%. Pulm rec'd dc mucomyst. Increased clonus while having another episode of desaturation, given 4mg ativan, clonus broke. O2 sats normalized with deep suction. Back on vEEG per gen neuro. CT chest and sputum cx obtained per ID.   2/17: GEORGE ovn. Blood cultures drawn. ID rec'd treatment for stenotrophomonas. EEG with concern for singular seizure activity. Keppra and Phenytoin levels were drawn. 2x blood cultures sent prior to starting DS Bactrim and Minocycline PO as per ID recs. Additional standing ativan added per epilepsy.   2/18: GEORGE ovn. Per epilepsy ativan decreased to 1mg q12. EEG dc'd.  2/19: GEORGE ovn.     Vital Signs Last 24 Hrs  T(C): 37 (18 Feb 2025 22:07), Max: 37 (18 Feb 2025 22:07)  T(F): 98.6 (18 Feb 2025 22:07), Max: 98.6 (18 Feb 2025 22:07)  HR: 75 (18 Feb 2025 23:58) (62 - 82)  BP: 121/83 (18 Feb 2025 19:54) (100/69 - 132/91)  BP(mean): 98 (18 Feb 2025 19:54) (80 - 107)  RR: 14 (18 Feb 2025 21:22) (14 - 14)  SpO2: 95% (18 Feb 2025 23:58) (95% - 100%)    Parameters below as of 18 Feb 2025 21:22  Patient On (Oxygen Delivery Method): ventilator    O2 Concentration (%): 40    I&O's Summary    17 Feb 2025 07:01  -  18 Feb 2025 07:00  --------------------------------------------------------  IN: 1348 mL / OUT: 975 mL / NET: 373 mL    18 Feb 2025 07:01  -  19 Feb 2025 00:14  --------------------------------------------------------  IN: 812 mL / OUT: 800 mL / NET: 12 mL    PHYSICAL EXAM:  Constitutional: NAD, lying in bed.  HEENT: Pupils equal, round, reactive to light.   Respiratory: +Trach to mechanical vent. No respiratory distress, symmetric chest rise  Cardiovascular: Regular rate and rhythm    Gastrointestinal: +PEG, Abdomen soft, nondistended.  Neurological:  AAOX0-1. Opens eyes. Tracks vertically  Motor: RUE squeezes hand to comman, LUE 0/5, B/l LE w/d.   Sensation: unable to assess  Extremities: Warm, well perfused.    TUBES/LINES:  [] Gabriel  [] Lumbar Drain  [] Wound Drains  [] Others      DIET:  [] NPO  [] Mechanical  [x] Tube feeds    LABS:                        10.2   6.30  )-----------( 239      ( 18 Feb 2025 05:30 )             32.2     02-18    136  |  100  |  50[H]  ----------------------------<  102[H]  4.5   |  26  |  1.27    Ca    9.6      18 Feb 2025 05:30  Phos  4.8     02-18  Mg     2.3     02-18        Urinalysis Basic - ( 18 Feb 2025 05:30 )    Color: x / Appearance: x / SG: x / pH: x  Gluc: 102 mg/dL / Ketone: x  / Bili: x / Urobili: x   Blood: x / Protein: x / Nitrite: x   Leuk Esterase: x / RBC: x / WBC x   Sq Epi: x / Non Sq Epi: x / Bacteria: x          CAPILLARY BLOOD GLUCOSE          Drug Levels: [] N/A  Phenytoin Level, Serum: 18.5 ug/mL (02-17 @ 12:57)    CSF Analysis: [] N/A      Allergies    Allergy Status Unknown    Intolerances      MEDICATIONS:  Antibiotics:  minocycline 200 milliGRAM(s) Oral <User Schedule>  trimethoprim  160 mG/sulfamethoxazole 800 mG 2 Tablet(s) Oral every 8 hours    Neuro:  acetaminophen   Oral Liquid .. 650 milliGRAM(s) Oral every 6 hours PRN  baclofen 5 milliGRAM(s) Oral every 12 hours  levETIRAcetam 1500 milliGRAM(s) Oral every 12 hours  LORazepam     Tablet 1 milliGRAM(s) Oral every 12 hours  LORazepam   Injectable 2 milliGRAM(s) IV Push once PRN  phenytoin   Suspension 150 milliGRAM(s) Oral <User Schedule>  phenytoin   Suspension 200 milliGRAM(s) Oral <User Schedule>    Anticoagulation:  heparin   Injectable 5000 Unit(s) SubCutaneous every 8 hours    OTHER:  albuterol/ipratropium for Nebulization 3 milliLiter(s) Nebulizer every 4 hours  amLODIPine   Tablet 5 milliGRAM(s) Oral daily  artificial tears (preservative free) Ophthalmic Solution 1 Drop(s) Right EYE every 4 hours  chlorhexidine 0.12% Liquid 15 milliLiter(s) Oral Mucosa every 12 hours  doxazosin 8 milliGRAM(s) Oral at bedtime  erythromycin   Ointment 1 Application(s) Both EYES four times a day  fluticasone propionate 50 MICROgram(s)/spray Nasal Spray 1 Spray(s) Both Nostrils two times a day  influenza   Vaccine 0.5 milliLiter(s) IntraMuscular once  pantoprazole   Suspension 40 milliGRAM(s) Oral daily  petrolatum Ophthalmic Ointment 1 Application(s) Both EYES four times a day  polyethylene glycol 3350 17 Gram(s) Oral daily  propranolol 5 milliGRAM(s) Oral every 12 hours  senna 2 Tablet(s) Oral at bedtime  sodium chloride 0.65% Nasal 1 Spray(s) Both Nostrils two times a day    IVF:    CULTURES:  Culture Results:   No growth at 24 hours (02-17 @ 12:10)  Culture Results:   No growth at 24 hours (02-17 @ 12:10)    RADIOLOGY & ADDITIONAL TESTS:      ASSESSMENT:  44M PMH ?stroke/MI present to [facility] after collapsing at work. Decorticate posturing, vomiting, intubated for airway protection. 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 Transferred to [facility] for further management. s/p trach 10/14. s/p peg 10/21. Re-upgrade to ICU 2/2 desaturation event and suctioning requirements 11/15. Re-upgrade to NSICU 12/15 2/2 desaturation and tachycardia.    AMS    Intubate    Handoff    MEWS Score    Acute myocardial infarction    CVA (cerebral vascular accident)    Intracerebral hemorrhage of brain stem    Brainstem stroke    Brain stem stroke syndrome    Brain stem hemorrhage    Brain stem stroke syndrome    Percutaneous tracheal puncture    Hemorrhagic stroke    Brainstem stroke    Pontine hemorrhage    Brainstem stroke    Encephalopathy acute    Functional quadriplegia    Advanced care planning/counseling discussion    Encounter for palliative care    Pontine hemorrhage    Neurogenic dysphagia    Chronic respiratory failure    Acute kidney injury superimposed on CKD    Acute urinary retention    Hypertensive emergency    Sepsis, unspecified organism    Sepsis    Gram-negative bacteremia    Dyspnea    Percutaneous tracheal puncture    Altered mental status examination    EGD, with PEG    AMS    Room Service Assist    EGD, with PEG    Functional quadriplegia    Neurogenic dysphagia    Chronic respiratory failure    Encounter for palliative care    Acute urinary retention    Hypertension    Seizures    [email or web address]        PLAN:    PLAN:  Neuro:  - neuro/vitals q4h  - pain control: tylenol prn  - seizure tx: keppra 1500mg BID, phenytoin 150/150/200, ativan 1mg q12  - s/p vEEG (10/3-4), (10/17-10/19), (12/17-12/18), (12/22-12/25), (1/26-1/28), (2/16-2/18) +seizure on eeg 2/17  - CTH 9/30: enlarged pontine hemorrhage, CTH 10/3: stable, CTH 10/25: mostly resolved pontine hemorrhage, CTH 12/15: R mastoid air cell opacification; acute otitis media vs sterile effusion, CTH 12/18: stable. CTH 1/21 stable, CTH 1/27 stable  - MRI brain 10/12: parenchymal hemorrhage, acute/subacute R cerebellar stroke      CV:  - -160  - tachycardia: propranolol 5mg BID   - HTN: amlodipine 5mg  - echo (9/30) EF 75%, repeat 12/16: 57%    PULM:  - s/p trach exchange with pulm at bedside 2/14 to vent, AC/VC 40/400/14/6  - Secretions: duonebs/chest PT q4h  - CT chest 2/27: Near complete collapse b/l lower lobes. Patchy opacity within LLL/ALONDRA  - pulmonology consulted, recs appreciated    GI:  - TFs via PEG, [facility]  - bowel regimen, last BM 2/17  - baclofen 5q12 for hiccups    Renal:  - IVL  - FW 200q8 for hydration  - Voiding, SC prn  - CKD: trend BUN/Cr  - renal US 10/1, 10/8, 1/15: Increased bilateral renal echogenicity consistent with medical renal disease    Endo:  - A1c 5.4    Heme:  - DVT ppx: SCDs, SQH 5000u q8h   - LE dopplers negative 12/16    ID:  - last blood/sputum cx 2/17: sputum growing stenotrophomonas  - currently on: DS bactrim 2 tabs TID, 200 mg minocycline BID per ID (pending duration)  - +klebsiella UTI s/p levaquin (1/30-2/3)  - empiric PNA: cefepime (12/22-12/30), s/p vanc (12/22-12/23), s/p zosyn (12/15-12/20), s/p vanc (12/15-12/16), s/p zosyn (1/12 -1/18)  - s/p Stenotrophomonas maltophilia PNA: s/p Bactrim (11/18-11/19) s/p Cefepime (11/16-11/18)  - 11/3, (+) sputum for stenotrophomas maltophlia, blood cx (+) klebsiella, cefepime 2gq12 (11/6 - 11/12)   - S/p Ancef (10/4-10/14) for PNA, and s/p Unasyn (10/18-10/23) +actinobacter baumanii     MISC:  - BL keratitis: BL erythromycin ointment, artificial tears, lacrilube q4, moisture chamber   - Penile wound: wound care rec barrier wipes     Dispo: SDU status, DNR, pending SNF    D/w Dr. D'Amico

## 2025-02-19 NOTE — PROGRESS NOTE ADULT - ASSESSMENT
OUTPATIENT PROGRESS NOTE  TRANSITIONAL CARE MANAGEMENT - HOSPITAL DISCHARGE FOLLOW-UP      CHIEF COMPLAINT  Transitional Care Management (Hospital Follow-up ) and Gastro-intestinal Problem (Having Diarrhea for a week now. )      Mr. Ag is accompanied today by his spouse.    SUBJECTIVE   The patient was discharged from the hospital on 5/23/17. The Discharge Summary was reviewed. It documents that the patient was hospitalized for Shubham Ag is a 48 yrs old male with a history of multiple sclerosis with lower extremity weakness and recently diagnosed metastatic non-small cell lung cancer (diagnosed in April 2017). He is receiving palliative carboplatin and Taxol therapy. He was discharged from [facility] following an inpatient stay for chemotherapy due to his debilitated status and history of not tolerating Taxol well. He was treated with xyvega .  He was discharged to rehabilitation facility in [place]. The morning of admission his blood pressure was 100/60 which is a little low for him but not concerning. About 1.5 hours later he was working with PT and felt faint. His blood pressure was 70/50. He was also tachycardic with a heart rate in the 140s. He was transferred to [facility] via EMS.     In the emergency room he was found to be in 2:1 atrial flutter. They consulted electrophysiology. He was given Cardizem and metoprolol but this did not help. He was then given Versed and cardioverted. He converted to a sinus rhythm in the 90s. His blood pressure did remain low and he responded to a fluid bolus. He did not have a fever. His white count was normal. He has a chronic indwelling Conklin and did have White cells and bacteria in the urine. He was placed on Rocephin for several days due to the UTI, recent chemotherapy, and hypotension. He was admitted to the intensive care unit for further evaluation and treatment.      Cardiology was consulted. He was started on metoprolol. He  44 year-old-male with unclear PMH (?stroke, MI) who was found down at work, intubated for airway protection and found to have acute large parenchymal hemorrhage within nabil with mass effect (+ acute/subacute right cerebellar infarct) in setting of hypertensive emergency, and R cerebellar stroke transferred to [facility] for further neurosurgical care. Previous vEEG was negative so was discontinued. On 2/16, patient had an episode of moving head, arms and leg uncontrollably with desaturation treated with Ativan 4 mg IV and was placed back on vEEG. vEEG 2/16-18 showed several right anterior quadrant run of spiky waves without evolution in morphology (possible artifact rather than seizures) and severe generalized slowing.    Recommendations:  - Continue Keppra 1500mg Q12hrs   - Continue Phenytoin to 150/150/200mg   - Continue Ativan 1 mg to q12h   - Maintain seizure/fall/aspiration precautions  - Management per primary team     Please contact epilepsy for if there are any neurological changes or questions    Discussed with Attending Dr. Ureña     remained in a sinus rhythm during his entire stay. His blood pressure improved over the next 24 hours with some IV hydration. An echo was obtained showing a normal ejection fraction and nothing significant was noted.     He was complaining of right-sided pleuritic chest pain. This is where his tumor is located he also has bone metastases. He had a small amount of hemoptysis after starting Lovenox for DVT prophylaxis. CT scan to rule out PE was performed and this did not show any evidence of pulmonary embolism. His anticoagulation was held due to the hemoptysis.     His white count did decrease slightly from his chemotherapy. He remained afebrile. On the day of discharge his labs were stable. His white count was 3.7 and he was not neutropenic.  He does have chronic pain. His pain was controlled with MS Contin long-acting. He also felt that Vicodin was better for him for his breakthrough pain. He will be given as prescription for this upon discharge.     he was evaluated by physical therapy and occupational therapy. He was seen by . He did not qualify for an inpatient rehabilitation stay.\[facility]. He refused to go back to the  facility where he came from. He wanted to go home. Arrangements were made for home PT, OT, and home RN. Hospital bed and Rony lift were arranged. He was able to transfer with assistance of one. He is going to be discharged home today in medically stable condition. He should follow-up with his oncologist and primary care physician. He will also follow-up with the urologist. He has had a chronic indwelling Conklin in since April 12 and he was hoping that he may be able to have this removed at some point.    Pertinent un-finalized hospital performed diagnostic tests - were reviewed..    Pertinent un-finalized hospital lab tests - not applicable.    Advanced Directives:  · Do Not Intubate/Do Not Resuscitate    Durable Medical  Equipment/Assistive devices prescribed: None     MEDICATIONS  The discharge medication list was reviewed. Outpatient medications were updated today. He is fully compliant with the medication regimen prescribed at the time of discharge.    HISTORIES  I have personally reviewed and updated the following electronic medical record sections: Problem List, Past Medical History and Past Surgical History.    REVIEW OF SYSTEMS  GENERAL: denies fever and weight gain  SKIN: denies pigmentation or loss of pigmentation, rash, itching, bruising, lumps or bumps and nail changes  MOUTH: denies lumps or bumps in the mouth, abnormality of the teeth or gums and hoarse voice  NECK: denies swelling or masses in the neck, stiffness in the neck, goiter, dysphagia, sensation of food sticking and reflux  CARDIORESPIRATORY: denies edema, cough, wheeze, sputum, paroxysmal nocturnal dyspnea, orthopnea, cyanosis and claudication  GASTROINTESTINAL: denies abdominal pain, nausea, vomiting, constipation, diarrhea, dysphagia, sour burps, hematemesis, change in bowel habits, flatulence, jaundice, hemorrhoids, history of receiving blood products and history of hepatitis or jaundice  VASCULAR: denies claudication at rest, discoloration of the feet and change in the nails  PSYCHOLOGICAL: denies social avoidance, suicide ideation, history of suicide attempt, family history of suicide, hopelessness, helplessness, guilt, insomnia, anxiety and lack of motivation  MUSCULOSKELETAL: denies joint pain, muscle pain, stiff neck, tension headache, back pain and radiating symptoms    PHYSICAL EXAM  Visit Vitals   • /66 (BP Location: [facility], Patient Position: Sitting, Cuff Size: Regular)   • Pulse 102   • Wt 90.8 kg   • BMI 26.41 kg/m2     General appearance: alert, cachectic, cooperative, fatigued and mild distress  Head: Normocephalic, without obvious abnormality, atraumatic  Eyes: conjunctivae/sclerae normal. No erythema, edema or exudate.  Throat: abnormal  findings: cheilitis and dentition: poor  Neck: no adenopathy, no carotid bruit, no JVD, supple, symmetrical, trachea midline and thyroid not enlarged, symmetric, no tenderness/mass/nodules  Back: Back symmetric, no curvature. Range of motion normal. No costovertebral angle tenderness.  Lungs: clear to auscultation bilaterally  Chest wall: no tenderness  Heart: regular rate and rhythm, S1, S2 normal, no murmur, click, rub or gallop  Abdomen: Soft, non-tender; bowel sounds normal; no masses, no organomegaly  Male genitalia: normal, abnormal findings: due to urinary retention he does have an indwelling ortega catheter  Extremities: no edema, redness or tenderness in the calves or thighs and muscle tone from MS is unchanged and he cannot stand to bear weight   Skin: eczema - scalp, erythema - hand(s) bilateral and dry cracked skin of face and lips jose      ASSESSMENT  1. Non-small cell lung cancer, right (CMS/HCC)    2. Secondary malignant neoplasm of bone (CMS/HCC)    3.      UTI and acute urinary retention  4.      MS clinically stable at this time  5.      weight loss due to cancer --discussed augmenting diet with protein   PLAN  continue ongoing oncology care with Dr Valadez  supplement dietary intake with protein   stay at home   f/u with urology for urinary retention    Patient adherence to his treatment plan was assessed. He is   fully compliant with the entire discharge treatment plan. Patient was seen for a detailed history, detailed examination, medical decision making of moderate complexity .

## 2025-02-19 NOTE — PROVIDER CONTACT NOTE (OTHER) - ASSESSMENT
Pt is obtunded, opens eyes to vigorous stimulation and to pain, does not follow commands, moves extremities to pain A&O X 0, neuro exam charted in flow sheet @ 1236

## 2025-02-19 NOTE — PROGRESS NOTE ADULT - ASSESSMENT
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  He is undocumented so they can’t place him.  He is s/p trach/PEG s/p multiple antibiotic courses for presumed pneumonia (including Acinetobacter), Klebsiella aerogenes bacteremia (source not identified), respiratory tract colonization by Stenotrophomonas, Klebsiella aerogenes VAP in Dec (resolved without treating Steno).  He was treated with pip-tazo from 1/12-1/30 for fever, presumed pneumonia.  CXR showed R basilar atelectasis, sputum culture grew mixed GNRs and NRF.  He was treated with levofloxacin from 1/30-2/3 for UTI, urine culture grew >10^5 Klebsiella aerogenes. On 2/13, he underwent bronch in prep for trach exchange that he subsequently did not tolerate. Culture from bronch grew Stenotrophomonas, with which he has been colonized for months.  Episodes of desaturation following trach exchange on 2/14 CT chest on 1/17 showed near complete collapse both lower lobes and patchy opacity within the aerated left lower lobe as well as the left upper lobes may be infectious or inflammatory.  He was started on TMP/SX and minocycline on 2/17 based upon bronch culture from 2/13.  Sputum culture from 2/16 grew Enterobacter cloacae complex in addition to the Stenotrophomonas.  Suspect Enterobacter is the culprit, unclear that Stenotrophomonas is causing disease.  He now has increased serum Cr - likely due to TMP/SX, either due to increased tubular resorption of creatinine (pseudo) or real decrease in renal function.    Suggest:  - F/U blood cultures from 2/17  - Please check liver enzymes  - Start ertapenem 1 g IV q24h  - Stop TMP/SX and minocycline  Recommendations discussed with primary team - will follow with you - team 2.

## 2025-02-19 NOTE — PROGRESS NOTE ADULT - ASSESSMENT
44M with unclear PMH (?stroke, MI) who was found down at work, intubated for airway protection and found to have acute parenchymal hemorrhage within nabil with mass effect (+ acute/subacute right cerebellar infarct) in setting of hypertensive emergency, transferred to [facility] for further neurosurgical care. Hospital course c/b poor neurologic recovery s/p trach-PEG, AUR s/p gabriel, ANIKA on CKD c/b hyperkalemia, HAP s/p amp-sulbactam (EOT 10/23), K aerogenes bacteremia  treated with 2 weeks of Cefepime per ID , On 11/15 he became septic and was transferred to NSICU due to increased o2 requirements and needed Vent support , Trach Cultures + for Stenotrophomonas which was treated with 7 days of Bactrim per ID , has been weaned of Vent since 11/23 and is now on 10lts at 40% o2 through Trach Collar. Stepped up to the NSICU on 12/15 for hypoxia and tachycardia. Pt s/p  abx for 5 days. Pt now stepped down to 8Lach.    # Pontine hemorrhage  # Encephalopathy due to Intracranial Bleed   - Acute parenchymal hemorrhage within nabil with mass effect (+ acute/subacute right cerebellar infarct) in setting of hypertensive emergency.   - MRI brain also demonstrated acute/subacute R cerebellar stroke.   - No Neurosurgical Interventions were performed   - Secondary to IPH and cerebellar stroke - Trach and PEG Dependent    # Seizures  -Pt with episode of facial and leg twitching on 2/16 which resolved after the pt was given ativan 4mg x1.   - Continue Seizure precautions   - Continue Keppra and phenytoin  - Will continue EEG monitoring and will f/u with Epilepsy team      # Hypertension  - Continue amlodipine 5mg daily with holding parameters  - Continue Propranolol with holding parameters     #Acute on Chronic respiratory failure likely 2/2 Pneumonia    - s/p percutaneous trach by pulm on 10/14/24  - Pt s/p trach Exchange by Pulmonary on 2/14.   - Pt's BAL on 2/13 growing Stenotrophomonas   - Continue vent support and chest PT  - F/u ID recommendations - Bactrim and Minocycline     # Neurogenic dysphagia.   - Pt s/p PEG placement by  surgery 10/21/24  - Continue TF per nutrition  - Continue aspiration precautions and elevation of HOB.    # Acute urinary retention.   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 The necessity of the time spent during the encounter on this date of service was due to:    Review of hospital course, labs, vitals, medical records.  Bedside exam and interview of the patient  Discussed plan of care with primary team   Documenting the encounter.

## 2025-02-19 NOTE — PROGRESS NOTE ADULT - SUBJECTIVE AND OBJECTIVE BOX
EPILEPSY PROGRESS NOTE:  Patient seen at bedside this morning, noted to still have R sided facial twitching involving Right side of mouth and Right eye.      REVIEW OF SYSTEMS:  Unable to obtain, patient vented    MEDICATIONS:   MEDICATIONS  (STANDING):  albuterol/ipratropium for Nebulization 3 milliLiter(s) Nebulizer every 4 hours  amLODIPine   Tablet 5 milliGRAM(s) Oral daily  artificial tears (preservative free) Ophthalmic Solution 1 Drop(s) Both EYES every 4 hours  baclofen 5 milliGRAM(s) Oral every 12 hours  chlorhexidine 0.12% Liquid 15 milliLiter(s) Oral Mucosa every 12 hours  doxazosin 8 milliGRAM(s) Oral at bedtime  ertapenem  IVPB 1000 milliGRAM(s) IV Intermittent every 24 hours  erythromycin   Ointment 1 Application(s) Both EYES four times a day  fluticasone propionate 50 MICROgram(s)/spray Nasal Spray 1 Spray(s) Both Nostrils two times a day  heparin   Injectable 5000 Unit(s) SubCutaneous every 8 hours  influenza   Vaccine 0.5 milliLiter(s) IntraMuscular once  levETIRAcetam 1500 milliGRAM(s) Oral every 12 hours  LORazepam     Tablet 1 milliGRAM(s) Oral every 12 hours  pantoprazole   Suspension 40 milliGRAM(s) Oral daily  petrolatum Ophthalmic Ointment 1 Application(s) Both EYES four times a day  phenytoin   Suspension 150 milliGRAM(s) Oral <User Schedule>  phenytoin   Suspension 200 milliGRAM(s) Oral <User Schedule>  polyethylene glycol 3350 17 Gram(s) Oral daily  propranolol 5 milliGRAM(s) Oral every 12 hours  senna 2 Tablet(s) Oral at bedtime  sodium chloride 0.65% Nasal 1 Spray(s) Both Nostrils two times a day    MEDICATIONS  (PRN):  acetaminophen   Oral Liquid .. 650 milliGRAM(s) Oral every 6 hours PRN Temp greater or equal to 38C (100.4F), Mild Pain (1 - 3)  LORazepam   Injectable 2 milliGRAM(s) IV Push once PRN Seizure Activity (NOTIFY PROVIDER PRIOR TO GIVING)      VITAL SIGNS:  Vital Signs Last 24 Hrs  T(C): 36.8 (19 Feb 2025 13:58), Max: 37.1 (19 Feb 2025 04:41)  T(F): 98.3 (19 Feb 2025 13:58), Max: 98.8 (19 Feb 2025 04:41)  HR: 84 (19 Feb 2025 12:31) (68 - 86)  BP: 113/71 (19 Feb 2025 12:30) (108/71 - 135/87)  BP(mean): 88 (19 Feb 2025 12:30) (85 - 107)  RR: 15 (19 Feb 2025 12:31) (14 - 15)  SpO2: 95% (19 Feb 2025 12:31) (94% - 100%)    Parameters below as of 19 Feb 2025 12:31  Patient On (Oxygen Delivery Method): ventilator    O2 Concentration (%): 40    PHYSICAL EXAM:  Constitutional: Appearance is consistent with chronologic age. No acute distress  Cardiovascular: Regular rate and rhythm, no murmurs, rubs, or gallops. Extremities are warm and well perfused. Capillary refill is less than 2 seconds. No carotid bruits.   Pulmonary: Anterior breath sounds clear bilaterally, no crackles or wheezing. No use of accessory muscles  GI: Positive bowel sounds. Abdomen soft, non-distended, non-tender. No guarding or rebound. No masses.  Extremities: No significant deformity or joint abnormality. Radial and DP pulses +2, No edema.  Skin: normal color, texture and turgor with no lesions or eruptions.    Neurologic:  -Mental status: Awake, alert, oriented to person, place, and date. Speech is fluent with intact naming, repetition, and comprehension, no dysarthria. Recent and remote memory intact. Follows commands. Attention/concentration intact.   -Cranial nerves:   II: Visual fields are full to confrontation.  III, IV, VI: Extraocular movements are intact without nystagmus. Pupils equally round and reactive to light. 3mm Brisk.   V:  Facial sensation V1-V3 equal and intact   VII: Face is symmetric with normal eye closure and smile  VIII: Hearing is bilaterally intact to finger rub  IX, X: Uvula is midline and soft palate rises symmetrically  XI: Head turning and shoulder shrug are intact.  XII: Tongue protrudes midline  Motor: Normal bulk and tone. No pronator drift.   Strength:     [] Upper extremity                      Delt       Bicep    Tricep                                                  R         5/5        5/5        5/5       5/5                                               L          5/5        5/5        5/5       5/5  [] Lower extremity                       HF          KE          KF        DF         PF                                               R        5/5        5/5        5/5       5/5       5/5                                               L         5/5 5/5       5/5       5/5        5/5    Rapid alternating movements intact and symmetric  Sensation: Intact to light touch in all extremities.  Coordination: No dysmetria on finger-to-nose and heel-to-shin bilaterally  Reflexes: 2+ b/l biceps, triceps, patella and achilles. Plantar response downgoing b/l   Gait: Toe/heel/ Tandem/ Romberg    LABS:  CBC Full  -  ( 19 Feb 2025 08:12 )  WBC Count : 6.99 K/uL  RBC Count : 3.35 M/uL  Hemoglobin : 10.6 g/dL  Hematocrit : 32.5 %  Platelet Count - Automated : 242 K/uL  Mean Cell Volume : 97.0 fl  Mean Cell Hemoglobin : 31.6 pg  Mean Cell Hemoglobin Concentration : 32.6 g/dL  Auto Neutrophil # : x  Auto Lymphocyte # : x  Auto Monocyte # : x  Auto Eosinophil # : x  Auto Basophil # : x  Auto Neutrophil % : x  Auto Lymphocyte % : x  Auto Monocyte % : x  Auto Eosinophil % : x  Auto Basophil % : x    02-19    132[L]  |  97  |  51[H]  ----------------------------<  98  4.6   |  23  |  1.43[H]    Ca    9.2      19 Feb 2025 08:12  Phos  4.9     02-19  Mg     2.2     02-19   EPILEPSY PROGRESS NOTE:  Patient seen at bedside this morning, noted to still have R sided facial twitching involving Right side of mouth and Right eye.      REVIEW OF SYSTEMS:  Unable to obtain, patient vented    MEDICATIONS:   MEDICATIONS  (STANDING):  albuterol/ipratropium for Nebulization 3 milliLiter(s) Nebulizer every 4 hours  amLODIPine   Tablet 5 milliGRAM(s) Oral daily  artificial tears (preservative free) Ophthalmic Solution 1 Drop(s) Both EYES every 4 hours  baclofen 5 milliGRAM(s) Oral every 12 hours  chlorhexidine 0.12% Liquid 15 milliLiter(s) Oral Mucosa every 12 hours  doxazosin 8 milliGRAM(s) Oral at bedtime  ertapenem  IVPB 1000 milliGRAM(s) IV Intermittent every 24 hours  erythromycin   Ointment 1 Application(s) Both EYES four times a day  fluticasone propionate 50 MICROgram(s)/spray Nasal Spray 1 Spray(s) Both Nostrils two times a day  heparin   Injectable 5000 Unit(s) SubCutaneous every 8 hours  influenza   Vaccine 0.5 milliLiter(s) IntraMuscular once  levETIRAcetam 1500 milliGRAM(s) Oral every 12 hours  LORazepam     Tablet 1 milliGRAM(s) Oral every 12 hours  pantoprazole   Suspension 40 milliGRAM(s) Oral daily  petrolatum Ophthalmic Ointment 1 Application(s) Both EYES four times a day  phenytoin   Suspension 150 milliGRAM(s) Oral <User Schedule>  phenytoin   Suspension 200 milliGRAM(s) Oral <User Schedule>  polyethylene glycol 3350 17 Gram(s) Oral daily  propranolol 5 milliGRAM(s) Oral every 12 hours  senna 2 Tablet(s) Oral at bedtime  sodium chloride 0.65% Nasal 1 Spray(s) Both Nostrils two times a day    MEDICATIONS  (PRN):  acetaminophen   Oral Liquid .. 650 milliGRAM(s) Oral every 6 hours PRN Temp greater or equal to 38C (100.4F), Mild Pain (1 - 3)  LORazepam   Injectable 2 milliGRAM(s) IV Push once PRN Seizure Activity (NOTIFY PROVIDER PRIOR TO GIVING)      VITAL SIGNS:  Vital Signs Last 24 Hrs  T(C): 36.8 (19 Feb 2025 13:58), Max: 37.1 (19 Feb 2025 04:41)  T(F): 98.3 (19 Feb 2025 13:58), Max: 98.8 (19 Feb 2025 04:41)  HR: 84 (19 Feb 2025 12:31) (68 - 86)  BP: 113/71 (19 Feb 2025 12:30) (108/71 - 135/87)  BP(mean): 88 (19 Feb 2025 12:30) (85 - 107)  RR: 15 (19 Feb 2025 12:31) (14 - 15)  SpO2: 95% (19 Feb 2025 12:31) (94% - 100%)    Parameters below as of 19 Feb 2025 12:31  Patient On (Oxygen Delivery Method): ventilator    O2 Concentration (%): 40    PHYSICAL EXAM:  Constitutional: Appearance is consistent with chronologic age. Trach to vent.   Neurologic:  -Mental status: Awake, eyes open.   -Facial twitching localized to the R side of the face involving R eye and R side of his mouth  Reflexes: 1+ patella and achilles and 2+  biceps, triceps. Mute Plantar response       LABS:  CBC Full  -  ( 19 Feb 2025 08:12 )  WBC Count : 6.99 K/uL  RBC Count : 3.35 M/uL  Hemoglobin : 10.6 g/dL  Hematocrit : 32.5 %  Platelet Count - Automated : 242 K/uL  Mean Cell Volume : 97.0 fl  Mean Cell Hemoglobin : 31.6 pg  Mean Cell Hemoglobin Concentration : 32.6 g/dL  Auto Neutrophil # : x  Auto Lymphocyte # : x  Auto Monocyte # : x  Auto Eosinophil # : x  Auto Basophil # : x  Auto Neutrophil % : x  Auto Lymphocyte % : x  Auto Monocyte % : x  Auto Eosinophil % : x  Auto Basophil % : x    02-19    132[L]  |  97  |  51[H]  ----------------------------<  98  4.6   |  23  |  1.43[H]    Ca    9.2      19 Feb 2025 08:12  Phos  4.9     02-19  Mg     2.2     02-19

## 2025-02-20 LAB
-  MINOCYCLINE: SIGNIFICANT CHANGE UP
ALBUMIN SERPL ELPH-MCNC: 3.4 G/DL — SIGNIFICANT CHANGE UP (ref 3.3–5)
ALP SERPL-CCNC: 188 U/L — HIGH (ref 40–120)
ALT FLD-CCNC: 16 U/L — SIGNIFICANT CHANGE UP (ref 10–45)
ANION GAP SERPL CALC-SCNC: 13 MMOL/L — SIGNIFICANT CHANGE UP (ref 5–17)
AST SERPL-CCNC: 12 U/L — SIGNIFICANT CHANGE UP (ref 10–40)
BILIRUB DIRECT SERPL-MCNC: <0.1 MG/DL — SIGNIFICANT CHANGE UP (ref 0–0.3)
BILIRUB INDIRECT FLD-MCNC: SIGNIFICANT CHANGE UP (ref 0.2–1)
BILIRUB SERPL-MCNC: 0.2 MG/DL — SIGNIFICANT CHANGE UP (ref 0.2–1.2)
BUN SERPL-MCNC: 52 MG/DL — HIGH (ref 7–23)
CALCIUM SERPL-MCNC: 9.6 MG/DL — SIGNIFICANT CHANGE UP (ref 8.4–10.5)
CHLORIDE SERPL-SCNC: 97 MMOL/L — SIGNIFICANT CHANGE UP (ref 96–108)
CO2 SERPL-SCNC: 23 MMOL/L — SIGNIFICANT CHANGE UP (ref 22–31)
CREAT SERPL-MCNC: 1.47 MG/DL — HIGH (ref 0.5–1.3)
CULTURE RESULTS: ABNORMAL
EGFR: 59 ML/MIN/1.73M2 — LOW
EGFR: 59 ML/MIN/1.73M2 — LOW
GLUCOSE SERPL-MCNC: 100 MG/DL — HIGH (ref 70–99)
HCT VFR BLD CALC: 31.8 % — LOW (ref 39–50)
HGB BLD-MCNC: 10.3 G/DL — LOW (ref 13–17)
LEVETIRACETAM SERPL-MCNC: 37.4 UG/ML — SIGNIFICANT CHANGE UP (ref 10–40)
MAGNESIUM SERPL-MCNC: 2.2 MG/DL — SIGNIFICANT CHANGE UP (ref 1.6–2.6)
MCHC RBC-ENTMCNC: 31.2 PG — SIGNIFICANT CHANGE UP (ref 27–34)
MCHC RBC-ENTMCNC: 32.4 G/DL — SIGNIFICANT CHANGE UP (ref 32–36)
MCV RBC AUTO: 96.4 FL — SIGNIFICANT CHANGE UP (ref 80–100)
METHOD TYPE: SIGNIFICANT CHANGE UP
NRBC BLD AUTO-RTO: 0 /100 WBCS — SIGNIFICANT CHANGE UP (ref 0–0)
ORGANISM # SPEC MICROSCOPIC CNT: ABNORMAL
ORGANISM # SPEC MICROSCOPIC CNT: SIGNIFICANT CHANGE UP
PHOSPHATE SERPL-MCNC: 5.4 MG/DL — HIGH (ref 2.5–4.5)
PLATELET # BLD AUTO: 240 K/UL — SIGNIFICANT CHANGE UP (ref 150–400)
POTASSIUM SERPL-MCNC: 4.5 MMOL/L — SIGNIFICANT CHANGE UP (ref 3.5–5.3)
POTASSIUM SERPL-SCNC: 4.5 MMOL/L — SIGNIFICANT CHANGE UP (ref 3.5–5.3)
PROT SERPL-MCNC: 7.2 G/DL — SIGNIFICANT CHANGE UP (ref 6–8.3)
RBC # BLD: 3.3 M/UL — LOW (ref 4.2–5.8)
RBC # FLD: 12.4 % — SIGNIFICANT CHANGE UP (ref 10.3–14.5)
SODIUM SERPL-SCNC: 133 MMOL/L — LOW (ref 135–145)
WBC # BLD: 6.7 K/UL — SIGNIFICANT CHANGE UP (ref 3.8–10.5)
WBC # FLD AUTO: 6.7 K/UL — SIGNIFICANT CHANGE UP (ref 3.8–10.5)

## 2025-02-20 PROCEDURE — 99232 SBSQ HOSP IP/OBS MODERATE 35: CPT

## 2025-02-20 PROCEDURE — 99232 SBSQ HOSP IP/OBS MODERATE 35: CPT | Mod: GC

## 2025-02-20 PROCEDURE — 99231 SBSQ HOSP IP/OBS SF/LOW 25: CPT

## 2025-02-20 PROCEDURE — G0545: CPT

## 2025-02-20 RX ORDER — HYPROMELLOSE 0.4 %
1 DROPS OPHTHALMIC (EYE) EVERY 4 HOURS
Refills: 0 | Status: DISCONTINUED | OUTPATIENT
Start: 2025-02-20 | End: 2025-03-13

## 2025-02-20 RX ORDER — ERYTHROMYCIN 5 MG/G
1 OINTMENT OPHTHALMIC EVERY 4 HOURS
Refills: 0 | Status: DISCONTINUED | OUTPATIENT
Start: 2025-02-20 | End: 2025-03-13

## 2025-02-20 RX ORDER — POLYETHYLENE GLYCOL 3350 17 G/17G
17 POWDER, FOR SOLUTION ORAL
Refills: 0 | Status: DISCONTINUED | OUTPATIENT
Start: 2025-02-20 | End: 2025-02-22

## 2025-02-20 RX ADMIN — Medication 150 MILLIGRAM(S): at 11:37

## 2025-02-20 RX ADMIN — Medication 1 SPRAY(S): at 17:48

## 2025-02-20 RX ADMIN — Medication 2 TABLET(S): at 22:05

## 2025-02-20 RX ADMIN — Medication 1 DROP(S): at 22:06

## 2025-02-20 RX ADMIN — Medication 1 MILLIGRAM(S): at 00:15

## 2025-02-20 RX ADMIN — ERTAPENEM SODIUM 120 MILLIGRAM(S): 1 INJECTION, POWDER, LYOPHILIZED, FOR SOLUTION INTRAMUSCULAR; INTRAVENOUS at 13:12

## 2025-02-20 RX ADMIN — LEVETIRACETAM 1500 MILLIGRAM(S): 10 INJECTION, SOLUTION INTRAVENOUS at 05:29

## 2025-02-20 RX ADMIN — Medication 1 APPLICATION(S): at 06:04

## 2025-02-20 RX ADMIN — Medication 1 SPRAY(S): at 05:33

## 2025-02-20 RX ADMIN — HEPARIN SODIUM 5000 UNIT(S): 1000 INJECTION INTRAVENOUS; SUBCUTANEOUS at 22:05

## 2025-02-20 RX ADMIN — Medication 1 APPLICATION(S): at 11:55

## 2025-02-20 RX ADMIN — Medication 1 DROP(S): at 17:49

## 2025-02-20 RX ADMIN — ERYTHROMYCIN 1 APPLICATION(S): 5 OINTMENT OPHTHALMIC at 00:15

## 2025-02-20 RX ADMIN — Medication 1 DROP(S): at 10:26

## 2025-02-20 RX ADMIN — Medication 15 MILLILITER(S): at 05:32

## 2025-02-20 RX ADMIN — ERYTHROMYCIN 1 APPLICATION(S): 5 OINTMENT OPHTHALMIC at 22:04

## 2025-02-20 RX ADMIN — AMLODIPINE BESYLATE 5 MILLIGRAM(S): 10 TABLET ORAL at 05:30

## 2025-02-20 RX ADMIN — IPRATROPIUM BROMIDE AND ALBUTEROL SULFATE 3 MILLILITER(S): .5; 2.5 SOLUTION RESPIRATORY (INHALATION) at 05:31

## 2025-02-20 RX ADMIN — Medication 15 MILLILITER(S): at 17:48

## 2025-02-20 RX ADMIN — BACLOFEN 5 MILLIGRAM(S): 10 INJECTION INTRATHECAL at 05:27

## 2025-02-20 RX ADMIN — Medication 1 APPLICATION(S): at 19:55

## 2025-02-20 RX ADMIN — Medication 40 MILLIGRAM(S): at 11:39

## 2025-02-20 RX ADMIN — IPRATROPIUM BROMIDE AND ALBUTEROL SULFATE 3 MILLILITER(S): .5; 2.5 SOLUTION RESPIRATORY (INHALATION) at 17:48

## 2025-02-20 RX ADMIN — IPRATROPIUM BROMIDE AND ALBUTEROL SULFATE 3 MILLILITER(S): .5; 2.5 SOLUTION RESPIRATORY (INHALATION) at 03:19

## 2025-02-20 RX ADMIN — Medication 1 DROP(S): at 01:39

## 2025-02-20 RX ADMIN — IPRATROPIUM BROMIDE AND ALBUTEROL SULFATE 3 MILLILITER(S): .5; 2.5 SOLUTION RESPIRATORY (INHALATION) at 10:26

## 2025-02-20 RX ADMIN — HEPARIN SODIUM 5000 UNIT(S): 1000 INJECTION INTRAVENOUS; SUBCUTANEOUS at 13:12

## 2025-02-20 RX ADMIN — Medication 200 MILLIGRAM(S): at 19:36

## 2025-02-20 RX ADMIN — IPRATROPIUM BROMIDE AND ALBUTEROL SULFATE 3 MILLILITER(S): .5; 2.5 SOLUTION RESPIRATORY (INHALATION) at 13:11

## 2025-02-20 RX ADMIN — Medication 1 DROP(S): at 13:12

## 2025-02-20 RX ADMIN — Medication 1 APPLICATION(S): at 22:06

## 2025-02-20 RX ADMIN — LEVETIRACETAM 1500 MILLIGRAM(S): 10 INJECTION, SOLUTION INTRAVENOUS at 17:48

## 2025-02-20 RX ADMIN — HEPARIN SODIUM 5000 UNIT(S): 1000 INJECTION INTRAVENOUS; SUBCUTANEOUS at 05:31

## 2025-02-20 RX ADMIN — Medication 150 MILLIGRAM(S): at 03:10

## 2025-02-20 RX ADMIN — ERYTHROMYCIN 1 APPLICATION(S): 5 OINTMENT OPHTHALMIC at 11:37

## 2025-02-20 RX ADMIN — FLUTICASONE PROPIONATE 1 SPRAY(S): 50 SPRAY, METERED NASAL at 17:49

## 2025-02-20 RX ADMIN — DOXAZOSIN MESYLATE 8 MILLIGRAM(S): 8 TABLET ORAL at 22:05

## 2025-02-20 RX ADMIN — ERYTHROMYCIN 1 APPLICATION(S): 5 OINTMENT OPHTHALMIC at 05:30

## 2025-02-20 RX ADMIN — IPRATROPIUM BROMIDE AND ALBUTEROL SULFATE 3 MILLILITER(S): .5; 2.5 SOLUTION RESPIRATORY (INHALATION) at 22:05

## 2025-02-20 RX ADMIN — Medication 1 MILLIGRAM(S): at 13:12

## 2025-02-20 RX ADMIN — FLUTICASONE PROPIONATE 1 SPRAY(S): 50 SPRAY, METERED NASAL at 05:33

## 2025-02-20 RX ADMIN — ERYTHROMYCIN 1 APPLICATION(S): 5 OINTMENT OPHTHALMIC at 17:48

## 2025-02-20 RX ADMIN — POLYETHYLENE GLYCOL 3350 17 GRAM(S): 17 POWDER, FOR SOLUTION ORAL at 17:48

## 2025-02-20 RX ADMIN — Medication 1 DROP(S): at 05:27

## 2025-02-20 NOTE — PROGRESS NOTE ADULT - SUBJECTIVE AND OBJECTIVE BOX
INTERVAL HPI/OVERNIGHT EVENTS:  Afebrile, ongoing episodes of intermittent desaturation    ROS: unobtainable - somnolent/locked in      ANTIBIOTICS/RELEVANT:          Vital Signs Last 24 Hrs  T(C): 36.7 (20 Feb 2025 17:00), Max: 37.1 (19 Feb 2025 22:13)  T(F): 98.1 (20 Feb 2025 17:00), Max: 98.8 (19 Feb 2025 22:13)  HR: 84 (20 Feb 2025 20:29) (70 - 84)  BP: 117/71 (20 Feb 2025 20:29) (107/69 - 145/86)  BP(mean): 86 (20 Feb 2025 20:29) (83 - 111)  RR: 16 (20 Feb 2025 15:30) (14 - 16)  SpO2: 98% (20 Feb 2025 20:29) (92% - 100%)    Parameters below as of 20 Feb 2025 20:29  Patient On (Oxygen Delivery Method): ventilator    O2 Concentration (%): 40    PHYSICAL EXAM:  Constitutional:  Somnolent  HEENT:  NC, ointment in eyes, PERRL.  No nasal exudate or sinus tenderness;  Unable to visualize pharynx.  Sustained facial twitching  Neck:  +trach  Respiratory:  Clear bilaterally anteriorly  Cardiovascular:  RRR, S1S2, no murmur appreciated  Gastrointestinal:  +PEG, normoactive BS, soft, NT, no masses, guarding or rebound.  No HSM  Extremities:  No edema      LABS:                        10.3   6.70  )-----------( 240      ( 20 Feb 2025 05:30 )             31.8         02-20    133[L]  |  97  |  52[H]  ----------------------------<  100[H]  4.5   |  23  |  1.47[H]    Ca    9.6      20 Feb 2025 05:30  Phos  5.4     02-20  Mg     2.2     02-20    TPro  7.2  /  Alb  3.4  /  TBili  0.2  /  DBili  <0.1  /  AST  12  /  ALT  16  /  AlkPhos  188[H]  02-20      Urinalysis Basic - ( 20 Feb 2025 05:30 )    Color: x / Appearance: x / SG: x / pH: x  Gluc: 100 mg/dL / Ketone: x  / Bili: x / Urobili: x   Blood: x / Protein: x / Nitrite: x   Leuk Esterase: x / RBC: x / WBC x   Sq Epi: x / Non Sq Epi: x / Bacteria: x        MICROBIOLOGY:        RADIOLOGY & ADDITIONAL STUDIES:

## 2025-02-20 NOTE — PROGRESS NOTE ADULT - SUBJECTIVE AND OBJECTIVE BOX
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 5000u q8h   - LE dopplers negative     ID:  - sputum  with Enterobacter cloacae complex - Ertapenem (- ) per ID recs, likely conolonized with stenotrophomonas  - Currently on: ertapenum 1g qd per ID. s/p DS bactrim 2 tabs TID, 200 mg minocycline BID.   - +klebsiella UTI s/p levaquin (-2/3)  - empiric PNA: cefepime (-), s/p vanc (-), s/p zosyn (12/15-), s/p vanc (12/15-), s/p zosyn ( -)  - s/p Stenotrophomonas maltophilia PNA: s/p Bactrim (-) s/p Cefepime (-)  - 11/3, (+) sputum for stenotrophomas maltophlia, blood cx (+) klebsiella, cefepime 2gq12 ( - )   - S/p Ancef (10/4-10/14) for PNA, and s/p Unasyn (10/18-10/23) +actinobacter baumanii     MISC:  - BL keratitis: BL erythromycin ointment, artificial tears, lacrilube q4, moisture chamber   - Penile wound: wound care rec barrier wipes     Dispo: SDU status, DNR, pending SNF    D/w Dr. D'Amico

## 2025-02-20 NOTE — PROGRESS NOTE ADULT - ASSESSMENT
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  He is undocumented so they can’t place him.  He is s/p trach/PEG s/p multiple antibiotic courses for presumed pneumonia (including Acinetobacter), Klebsiella aerogenes bacteremia (source not identified), respiratory tract colonization by Stenotrophomonas, Klebsiella aerogenes VAP in Dec (resolved without treating Steno).  He was treated with pip-tazo from 1/12-1/30 for fever, presumed pneumonia.  CXR showed R basilar atelectasis, sputum culture grew mixed GNRs and NRF.  He was treated with levofloxacin from 1/30-2/3 for UTI, urine culture grew >10^5 Klebsiella aerogenes. On 2/13, he underwent bronch in prep for trach exchange that he subsequently did not tolerate. Culture from bronch grew Stenotrophomonas, with which he has been colonized for months.  Episodes of desaturation following trach exchange on 2/14 CT chest on 1/17 showed near complete collapse both lower lobes and patchy opacity within the aerated left lower lobe as well as the left upper lobes may be infectious or inflammatory.  He was started on TMP/SX and minocycline on 2/17 based upon bronch culture from 2/13.  Sputum culture from 2/16 grew Enterobacter cloacae complex in addition to the Stenotrophomonas.  Suspect Enterobacter is the culprit, unclear that Stenotrophomonas is causing disease.  He now has increased serum Cr - likely due to TMP/SX, either due to increased tubular resorption of creatinine (pseudo) or real decrease in renal function.  Changed to ertapenem 1/19.  Recognize that ertapenem may decrease seizure threshold but is considered relatively rare event.  Other options (cefepime, high dose TMP/SX) are worse and would expect seizures to stop if ertapenem is stopped.  Suggest:  - F/U blood cultures from 2/17  - Continue ertapenem 1 g IV q24h to complete 7 d course from start of TMP/SX (2/17-2/23)  Will follow with you, team 2.

## 2025-02-20 NOTE — PROGRESS NOTE ADULT - ASSESSMENT
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    Acute hypoxic respiratory failure  Brain stem stroke  Acute encephalopathy    Continues to be ventilator dependent. He still has some secretions, improving now compared to last week. Not able to clear on his own, and is being frequently suctioned.   S/p bronchoscopy with tracheostomy exchange 2/14/25, size 7.0 portex cuffed exchanged for size 7.0 portex cuffed    Pulmonary team asked to reassess given intermittent hypoxia. It is possible that with changes in position the tracheostomy tubing become obstructed, and he is currently being treated for VAP.   Currently at our assessment appears comfortable, on 40% FiO2 SpO2 96%, however RN reports that on two occasions today he dropped to 80s on oximetry, was suctioned etc, and position adjusted, with improvement.      - No need for tracheostomy revisions at this time, can continue with VAP treatment per ID  - intermittent hypoxia can be addressed with changing head position and suctioning  - please call pulmonary if there is refractory hypoxia or any concern for tracheostomy dysfunction  - Not a candidate for downsizing or decannulation at this time as patient still requires ventilator support  - Wean FiO2 as tolerated  - Frequent suctioning and airway clearance;      Please reach out with any tracheostomy care questions or concerns, will need another trach exchange in 04/2025.   If there is a change in mental status, and he is able to tolerate trach collar, clear secretions, we can reassess for decannulation sooner.        Patient was seen, evaluated and discussed with PCCM attending.  Please page pulmonary if there are any questions with above recommendations.

## 2025-02-20 NOTE — PROGRESS NOTE ADULT - SUBJECTIVE AND OBJECTIVE BOX
SUBJECTIVE :   He is non verbal due ot SAH   Nursing staff report  intermittent desaturations over night , improvement with suction - however suction with minimal output   awake and  Oriented       OBJECTIVE:  Vital Signs Last 24 Hrs  T(C): 36.4 (20 Feb 2025 13:57), Max: 37.1 (19 Feb 2025 22:13)  T(F): 97.6 (20 Feb 2025 13:57), Max: 98.8 (19 Feb 2025 22:13)  HR: 72 (20 Feb 2025 12:45) (70 - 96)  BP: 145/86 (20 Feb 2025 11:40) (107/69 - 145/86)  BP(mean): 111 (20 Feb 2025 11:40) (83 - 111)  RR: 14 (20 Feb 2025 12:45) (14 - 16)  SpO2: 93% (20 Feb 2025 12:45) (93% - 100%)    Parameters below as of 20 Feb 2025 12:45  Patient On (Oxygen Delivery Method): ventilator    O2 Concentration (%): 40      PHYSICAL EXAM:  Gen: Reclining in bed at time of exam; Pt was on EEG at time of evaluation; mild facial twitching   HEENT: trach in place   CV: RRR, +S1/S2  Pulm: adequate respiratory effort, no increase in work of breathing  Abd: soft, ND  Skin: warm and dry,   Neuro: awake, does not talk, opening eyes to voice     LABS:                                                          10.3   6.70  )-----------( 240      ( 20 Feb 2025 05:30 )             31.8     02-20    133[L]  |  97  |  52[H]  ----------------------------<  100[H]  4.5   |  23  |  1.47[H]    Ca    9.6      20 Feb 2025 05:30  Phos  5.4     02-20  Mg     2.2     02-20    TPro  7.2  /  Alb  3.4  /  TBili  0.2  /  DBili  <0.1  /  AST  12  /  ALT  16  /  AlkPhos  188[H]  02-20            2/16 CT chest w/o contrast: Near complete collapse both lower lobes. Patchy opacity within the   aerated left lower lobe as well as the left upper lobes may be infectious   or inflammatory.    2/13 BAL cx: stenotrophomonas   2/16 RVP/COVID: negative     MEDICATIONS  (STANDING):  albuterol/ipratropium for Nebulization 3 milliLiter(s) Nebulizer every 4 hours  amLODIPine   Tablet 5 milliGRAM(s) Oral daily  artificial tears (preservative free) Ophthalmic Solution 1 Drop(s) Right EYE every 4 hours  baclofen 5 milliGRAM(s) Oral every 12 hours  chlorhexidine 0.12% Liquid 15 milliLiter(s) Oral Mucosa every 12 hours  doxazosin 8 milliGRAM(s) Oral at bedtime  erythromycin   Ointment 1 Application(s) Both EYES four times a day  fluticasone propionate 50 MICROgram(s)/spray Nasal Spray 1 Spray(s) Both Nostrils two times a day  heparin   Injectable 5000 Unit(s) SubCutaneous every 8 hours  influenza   Vaccine 0.5 milliLiter(s) IntraMuscular once  levETIRAcetam 1500 milliGRAM(s) Oral every 12 hours  LORazepam     Tablet 1 milliGRAM(s) Oral three times a day  minocycline 200 milliGRAM(s) Oral <User Schedule>  pantoprazole   Suspension 40 milliGRAM(s) Oral daily  petrolatum Ophthalmic Ointment 1 Application(s) Both EYES four times a day  phenytoin   Suspension 150 milliGRAM(s) Oral <User Schedule>  phenytoin   Suspension 200 milliGRAM(s) Oral <User Schedule>  polyethylene glycol 3350 17 Gram(s) Oral daily  propranolol 5 milliGRAM(s) Oral every 12 hours  senna 2 Tablet(s) Oral at bedtime  sodium chloride 0.65% Nasal 1 Spray(s) Both Nostrils two times a day  trimethoprim  160 mG/sulfamethoxazole 800 mG 2 Tablet(s) Oral every 8 hours    MEDICATIONS  (PRN):  acetaminophen   Oral Liquid .. 650 milliGRAM(s) Oral every 6 hours PRN Temp greater or equal to 38C (100.4F), Mild Pain (1 - 3)  LORazepam   Injectable 2 milliGRAM(s) IV Push once PRN Seizure Activity (NOTIFY PROVIDER PRIOR TO GIVING)

## 2025-02-20 NOTE — PROGRESS NOTE ADULT - SUBJECTIVE AND OBJECTIVE BOX
PULMONARY CONSULT SERVICE FOLLOW-UP NOTE    INTERVAL HPI:  Reviewed chart and overnight events; patient seen and examined at bedside.    MEDICATIONS:  Pulmonary:  albuterol/ipratropium for Nebulization 3 milliLiter(s) Nebulizer every 4 hours    Antimicrobials:  ertapenem  IVPB 1000 milliGRAM(s) IV Intermittent every 24 hours    Anticoagulants:  heparin   Injectable 5000 Unit(s) SubCutaneous every 8 hours    Cardiac:  amLODIPine   Tablet 5 milliGRAM(s) Oral daily  doxazosin 8 milliGRAM(s) Oral at bedtime      Allergies    Allergy Status Unknown    Intolerances        Vital Signs Last 24 Hrs  T(C): 36.7 (20 Feb 2025 17:00), Max: 37.1 (19 Feb 2025 22:13)  T(F): 98.1 (20 Feb 2025 17:00), Max: 98.8 (19 Feb 2025 22:13)  HR: 76 (20 Feb 2025 17:18) (70 - 82)  BP: 112/74 (20 Feb 2025 15:30) (107/69 - 145/86)  BP(mean): 89 (20 Feb 2025 15:30) (83 - 111)  RR: 16 (20 Feb 2025 15:30) (14 - 16)  SpO2: 92% (20 Feb 2025 17:18) (92% - 100%)    Parameters below as of 20 Feb 2025 17:18  Patient On (Oxygen Delivery Method): ventilator    O2 Concentration (%): 40    02-19 @ 07:01  -  02-20 @ 07:00  --------------------------------------------------------  IN: 2704 mL / OUT: 1125 mL / NET: 1579 mL    02-20 @ 07:01  -  02-20 @ 20:17  --------------------------------------------------------  IN: 758 mL / OUT: 525 mL / NET: 233 mL      Mode: AC/ CMV (Assist Control/ Continuous Mandatory Ventilation)  RR (machine): 14  TV (machine): 400  FiO2: 40  PEEP: 5  ITime: 1  MAP: 7.5  PIP: 16      PHYSICAL EXAM:  Constitutional: NAD  HEENT: NC/AT; PERRL, anicteric sclera; MMM  Neck: supple  Cardiovascular: +S1/S2, RRR  Respiratory: CTA B/L; no W/R/R  Gastrointestinal: soft, NT/ND  Extremities: WWP; no edema, clubbing or cyanosis  Vascular: 2+ radial pulses B/L  Neurological: remains vent dependent    LABS:      CBC Full  -  ( 20 Feb 2025 05:30 )  WBC Count : 6.70 K/uL  RBC Count : 3.30 M/uL  Hemoglobin : 10.3 g/dL  Hematocrit : 31.8 %  Platelet Count - Automated : 240 K/uL  Mean Cell Volume : 96.4 fl  Mean Cell Hemoglobin : 31.2 pg  Mean Cell Hemoglobin Concentration : 32.4 g/dL  Auto Neutrophil # : x  Auto Lymphocyte # : x  Auto Monocyte # : x  Auto Eosinophil # : x  Auto Basophil # : x  Auto Neutrophil % : x  Auto Lymphocyte % : x  Auto Monocyte % : x  Auto Eosinophil % : x  Auto Basophil % : x    02-20    133[L]  |  97  |  52[H]  ----------------------------<  100[H]  4.5   |  23  |  1.47[H]    Ca    9.6      20 Feb 2025 05:30  Phos  5.4     02-20  Mg     2.2     02-20    TPro  7.2  /  Alb  3.4  /  TBili  0.2  /  DBili  <0.1  /  AST  12  /  ALT  16  /  AlkPhos  188[H]  02-20          Urinalysis Basic - ( 20 Feb 2025 05:30 )    Color: x / Appearance: x / SG: x / pH: x  Gluc: 100 mg/dL / Ketone: x  / Bili: x / Urobili: x   Blood: x / Protein: x / Nitrite: x   Leuk Esterase: x / RBC: x / WBC x   Sq Epi: x / Non Sq Epi: x / Bacteria: x                RADIOLOGY & ADDITIONAL STUDIES:

## 2025-02-20 NOTE — PROGRESS NOTE ADULT - ASSESSMENT
44M with unclear PMH (?stroke, MI) who was found down at work, intubated for airway protection and found to have acute parenchymal hemorrhage within nabil with mass effect (+ acute/subacute right cerebellar infarct) in setting of hypertensive emergency, transferred to [facility] for further neurosurgical care. Hospital course c/b poor neurologic recovery s/p trach-PEG, AUR s/p gabriel, ANIKA on CKD c/b hyperkalemia, HAP s/p amp-sulbactam (EOT 10/23), K aerogenes bacteremia  treated with 2 weeks of Cefepime per ID , On 11/15 he became septic and was transferred to NSICU due to increased o2 requirements and needed Vent support , Trach Cultures + for Stenotrophomonas which was treated with 7 days of Bactrim per ID , has been weaned of Vent since 11/23 and is now on 10lts at 40% o2 through Trach Collar. Stepped up to the NSICU on 12/15 for hypoxia and tachycardia. Pt s/p  abx for 5 days. Pt now stepped down to 8Lach.    # Pontine hemorrhage  # Encephalopathy due to Intracranial Bleed   - Acute parenchymal hemorrhage within nabil with mass effect (+ acute/subacute right cerebellar infarct) in setting of hypertensive emergency.   - MRI brain also demonstrated acute/subacute R cerebellar stroke.   - No Neurosurgical Interventions were performed   - Secondary to IPH and cerebellar stroke - Trach and PEG Dependent    # Seizures  -Pt with episode of facial and leg twitching on 2/16 which resolved after the pt was given ativan 4mg x1.   - Continue Seizure precautions   - Continue Keppra and phenytoin  - Will continue EEG monitoring and will f/u with Epilepsy team      # Hypertension  - Continue amlodipine 5mg daily with holding parameters  - BP has been consistently < 120/80 , will discontinue Propranolol     #Acute on Chronic respiratory failure likely 2/2 Pneumonia    - s/p percutaneous trach by pulm on 10/14/24  - Pt s/p trach Exchange by Pulmonary on 2/14.   - Pt's BAL on 2/13 growing Stenotrophomonas   - Continue vent support and chest PT  - F/u ID recommendations - Bactrim and Minocycline switched to Ertapenem on 2/18     # Neurogenic dysphagia.   - Pt s/p PEG placement by  surgery 10/21/24  - Continue TF per nutrition  - Continue aspiration precautions and elevation of HOB.    # Acute urinary retention.   - doxazosin 8mg qHS   - monitor urine output    # Functional quadriplegia in setting of brainstem hemorrhage  - decub precautions  - care per nursing protocol     # CKD stage III   -Baseline Creatinine 1.1  -Will continue to monitor creatinine and avoid nephrotoxic agenets          #DVT prop  -Heparin SQ TID    35 minutes spent on total encounter. The necessity of the time spent during the encounter on this date of service was due to:    Review of hospital course, labs, vitals, medical records.  Bedside exam and interview of the patient  Discussed plan of care with primary team   Documenting the encounter.

## 2025-02-21 LAB
ANION GAP SERPL CALC-SCNC: 13 MMOL/L — SIGNIFICANT CHANGE UP (ref 5–17)
BUN SERPL-MCNC: 52 MG/DL — HIGH (ref 7–23)
CALCIUM SERPL-MCNC: 9.6 MG/DL — SIGNIFICANT CHANGE UP (ref 8.4–10.5)
CHLORIDE SERPL-SCNC: 100 MMOL/L — SIGNIFICANT CHANGE UP (ref 96–108)
CO2 SERPL-SCNC: 23 MMOL/L — SIGNIFICANT CHANGE UP (ref 22–31)
CREAT ?TM UR-MCNC: 69 MG/DL — SIGNIFICANT CHANGE UP
CREAT SERPL-MCNC: 1.49 MG/DL — HIGH (ref 0.5–1.3)
EGFR: 58 ML/MIN/1.73M2 — LOW
EGFR: 58 ML/MIN/1.73M2 — LOW
GLUCOSE SERPL-MCNC: 122 MG/DL — HIGH (ref 70–99)
HCT VFR BLD CALC: 32.6 % — LOW (ref 39–50)
HGB BLD-MCNC: 10.8 G/DL — LOW (ref 13–17)
MAGNESIUM SERPL-MCNC: 2.3 MG/DL — SIGNIFICANT CHANGE UP (ref 1.6–2.6)
MCHC RBC-ENTMCNC: 31.3 PG — SIGNIFICANT CHANGE UP (ref 27–34)
MCHC RBC-ENTMCNC: 33.1 G/DL — SIGNIFICANT CHANGE UP (ref 32–36)
MCV RBC AUTO: 94.5 FL — SIGNIFICANT CHANGE UP (ref 80–100)
NRBC BLD AUTO-RTO: 0 /100 WBCS — SIGNIFICANT CHANGE UP (ref 0–0)
PHOSPHATE SERPL-MCNC: 5.2 MG/DL — HIGH (ref 2.5–4.5)
PLATELET # BLD AUTO: 228 K/UL — SIGNIFICANT CHANGE UP (ref 150–400)
POTASSIUM SERPL-MCNC: 4.4 MMOL/L — SIGNIFICANT CHANGE UP (ref 3.5–5.3)
POTASSIUM SERPL-SCNC: 4.4 MMOL/L — SIGNIFICANT CHANGE UP (ref 3.5–5.3)
RBC # BLD: 3.45 M/UL — LOW (ref 4.2–5.8)
RBC # FLD: 12.4 % — SIGNIFICANT CHANGE UP (ref 10.3–14.5)
SODIUM SERPL-SCNC: 136 MMOL/L — SIGNIFICANT CHANGE UP (ref 135–145)
SODIUM UR-SCNC: 43 MMOL/L — SIGNIFICANT CHANGE UP
WBC # BLD: 6.69 K/UL — SIGNIFICANT CHANGE UP (ref 3.8–10.5)
WBC # FLD AUTO: 6.69 K/UL — SIGNIFICANT CHANGE UP (ref 3.8–10.5)

## 2025-02-21 PROCEDURE — 99232 SBSQ HOSP IP/OBS MODERATE 35: CPT

## 2025-02-21 PROCEDURE — 99231 SBSQ HOSP IP/OBS SF/LOW 25: CPT

## 2025-02-21 PROCEDURE — G0545: CPT

## 2025-02-21 PROCEDURE — 99233 SBSQ HOSP IP/OBS HIGH 50: CPT

## 2025-02-21 RX ORDER — SODIUM CHLORIDE 9 G/1000ML
1000 INJECTION, SOLUTION INTRAVENOUS
Refills: 0 | Status: DISCONTINUED | OUTPATIENT
Start: 2025-02-21 | End: 2025-02-22

## 2025-02-21 RX ADMIN — Medication 150 MILLIGRAM(S): at 12:06

## 2025-02-21 RX ADMIN — Medication 2 TABLET(S): at 21:55

## 2025-02-21 RX ADMIN — Medication 15 MILLILITER(S): at 18:12

## 2025-02-21 RX ADMIN — ERYTHROMYCIN 1 APPLICATION(S): 5 OINTMENT OPHTHALMIC at 10:29

## 2025-02-21 RX ADMIN — IPRATROPIUM BROMIDE AND ALBUTEROL SULFATE 3 MILLILITER(S): .5; 2.5 SOLUTION RESPIRATORY (INHALATION) at 02:50

## 2025-02-21 RX ADMIN — DOXAZOSIN MESYLATE 8 MILLIGRAM(S): 8 TABLET ORAL at 21:55

## 2025-02-21 RX ADMIN — Medication 15 MILLILITER(S): at 05:26

## 2025-02-21 RX ADMIN — Medication 150 MILLIGRAM(S): at 05:00

## 2025-02-21 RX ADMIN — HEPARIN SODIUM 5000 UNIT(S): 1000 INJECTION INTRAVENOUS; SUBCUTANEOUS at 05:26

## 2025-02-21 RX ADMIN — ERYTHROMYCIN 1 APPLICATION(S): 5 OINTMENT OPHTHALMIC at 21:56

## 2025-02-21 RX ADMIN — Medication 1 SPRAY(S): at 18:14

## 2025-02-21 RX ADMIN — Medication 40 MILLIGRAM(S): at 12:06

## 2025-02-21 RX ADMIN — IPRATROPIUM BROMIDE AND ALBUTEROL SULFATE 3 MILLILITER(S): .5; 2.5 SOLUTION RESPIRATORY (INHALATION) at 05:27

## 2025-02-21 RX ADMIN — ERYTHROMYCIN 1 APPLICATION(S): 5 OINTMENT OPHTHALMIC at 18:13

## 2025-02-21 RX ADMIN — IPRATROPIUM BROMIDE AND ALBUTEROL SULFATE 3 MILLILITER(S): .5; 2.5 SOLUTION RESPIRATORY (INHALATION) at 18:12

## 2025-02-21 RX ADMIN — FLUTICASONE PROPIONATE 1 SPRAY(S): 50 SPRAY, METERED NASAL at 05:27

## 2025-02-21 RX ADMIN — IPRATROPIUM BROMIDE AND ALBUTEROL SULFATE 3 MILLILITER(S): .5; 2.5 SOLUTION RESPIRATORY (INHALATION) at 21:56

## 2025-02-21 RX ADMIN — Medication 200 MILLIGRAM(S): at 19:24

## 2025-02-21 RX ADMIN — Medication 1 DROP(S): at 18:14

## 2025-02-21 RX ADMIN — HEPARIN SODIUM 5000 UNIT(S): 1000 INJECTION INTRAVENOUS; SUBCUTANEOUS at 21:55

## 2025-02-21 RX ADMIN — Medication 1 MILLIGRAM(S): at 12:06

## 2025-02-21 RX ADMIN — FLUTICASONE PROPIONATE 1 SPRAY(S): 50 SPRAY, METERED NASAL at 18:13

## 2025-02-21 RX ADMIN — Medication 1 APPLICATION(S): at 10:25

## 2025-02-21 RX ADMIN — LEVETIRACETAM 1500 MILLIGRAM(S): 10 INJECTION, SOLUTION INTRAVENOUS at 05:27

## 2025-02-21 RX ADMIN — Medication 1 DROP(S): at 21:56

## 2025-02-21 RX ADMIN — Medication 1 MILLIGRAM(S): at 02:50

## 2025-02-21 RX ADMIN — Medication 1 APPLICATION(S): at 05:26

## 2025-02-21 RX ADMIN — SODIUM CHLORIDE 125 MILLILITER(S): 9 INJECTION, SOLUTION INTRAVENOUS at 18:11

## 2025-02-21 RX ADMIN — IPRATROPIUM BROMIDE AND ALBUTEROL SULFATE 3 MILLILITER(S): .5; 2.5 SOLUTION RESPIRATORY (INHALATION) at 10:25

## 2025-02-21 RX ADMIN — ERTAPENEM SODIUM 120 MILLIGRAM(S): 1 INJECTION, POWDER, LYOPHILIZED, FOR SOLUTION INTRAMUSCULAR; INTRAVENOUS at 13:05

## 2025-02-21 RX ADMIN — Medication 1 APPLICATION(S): at 02:51

## 2025-02-21 RX ADMIN — ERYTHROMYCIN 1 APPLICATION(S): 5 OINTMENT OPHTHALMIC at 02:51

## 2025-02-21 RX ADMIN — Medication 1 DROP(S): at 10:26

## 2025-02-21 RX ADMIN — Medication 1 APPLICATION(S): at 18:12

## 2025-02-21 RX ADMIN — IPRATROPIUM BROMIDE AND ALBUTEROL SULFATE 3 MILLILITER(S): .5; 2.5 SOLUTION RESPIRATORY (INHALATION) at 13:05

## 2025-02-21 RX ADMIN — LEVETIRACETAM 1500 MILLIGRAM(S): 10 INJECTION, SOLUTION INTRAVENOUS at 18:12

## 2025-02-21 RX ADMIN — Medication 1 APPLICATION(S): at 21:56

## 2025-02-21 RX ADMIN — AMLODIPINE BESYLATE 5 MILLIGRAM(S): 10 TABLET ORAL at 05:27

## 2025-02-21 RX ADMIN — ERYTHROMYCIN 1 APPLICATION(S): 5 OINTMENT OPHTHALMIC at 05:25

## 2025-02-21 RX ADMIN — Medication 1 DROP(S): at 05:25

## 2025-02-21 RX ADMIN — Medication 1 SPRAY(S): at 05:27

## 2025-02-21 RX ADMIN — ERYTHROMYCIN 1 APPLICATION(S): 5 OINTMENT OPHTHALMIC at 13:06

## 2025-02-21 RX ADMIN — Medication 1 DROP(S): at 13:06

## 2025-02-21 RX ADMIN — Medication 1 DROP(S): at 02:50

## 2025-02-21 RX ADMIN — HEPARIN SODIUM 5000 UNIT(S): 1000 INJECTION INTRAVENOUS; SUBCUTANEOUS at 13:06

## 2025-02-21 RX ADMIN — Medication 1 APPLICATION(S): at 13:06

## 2025-02-21 NOTE — PROGRESS NOTE ADULT - ASSESSMENT
44M with unclear PMH (?stroke, MI) who was found down at work, intubated for airway protection and found to have acute parenchymal hemorrhage within nabil with mass effect (+ acute/subacute right cerebellar infarct) in setting of hypertensive emergency, transferred to [facility] for further neurosurgical care. Hospital course c/b poor neurologic recovery s/p trach-PEG, AUR s/p gabriel, ANIKA on CKD c/b hyperkalemia, HAP s/p amp-sulbactam (EOT 10/23), K aerogenes bacteremia  treated with 2 weeks of Cefepime per ID , On 11/15 he became septic and was transferred to NSICU due to increased o2 requirements and needed Vent support , Trach Cultures + for Stenotrophomonas which was treated with 7 days of Bactrim per ID , has been weaned of Vent since 11/23 and is now on 10lts at 40% o2 through Trach Collar. Stepped up to the NSICU on 12/15 for hypoxia and tachycardia. Pt s/p  abx for 5 days. Pt now stepped down to 8Lach.    # Pontine hemorrhage  # Encephalopathy due to Intracranial Bleed   - Acute parenchymal hemorrhage within nabil with mass effect (+ acute/subacute right cerebellar infarct) in setting of hypertensive emergency.   - MRI brain also demonstrated acute/subacute R cerebellar stroke.   - No Neurosurgical Interventions were performed   - Trach and PEG Dependent    # Seizures  - Pt with episode of facial and leg twitching on 2/16 which resolved after the pt was given ativan 4mg x1. Off vEEG since 2/18   - Continue Seizure precautions   - Continue Keppra and phenytoin    # Hypertension  - Continue amlodipine 5mg daily with holding parameters    # Acute on Chronic respiratory failure   - s/p percutaneous trach by pulm on 10/14/24 - Trach Exchanged by Pulmonary on 2/14/25   - Pt's BAL on 2/13 growing Stenotrophomonas and Enterobacter Cloacae complex  - Continue vent support and chest PT  - F/u ID recommendations - Bactrim and Minocycline 2/17  switched to Ertapenem on 2/19 - End date 2/23 per ID     # Neurogenic dysphagia.   - Pt s/p PEG placement by  surgery 10/21/24  - Continue TF per nutrition  - Continue aspiration precautions and elevation of HOB.    # Acute urinary retention.   - doxazosin 8mg qHS   - Chronic Gabriel     # Functional quadriplegia in setting of brainstem hemorrhage  - decub precautions  - care per nursing protocol     # ANIKA on CKD stage III   - Baseline Creatinine 1.1  -  ? Volume depletion + Bactrim induced   - Check Urine Lytes, if Pre renal can Give LR 125ml for 8 hours   - Continue to monitor creatinine and avoid nephrotoxic agents    # Anemia of Chronic Disease     # DVT prop  -Heparin SQ TID    # Dispo : Awaiting Transfer to [facility]      35 minutes spent on total encounter. The necessity of the time spent during the encounter on this date of service was due to:    Review of hospital course, labs, vitals, medical records.  Bedside exam and interview of the patient  Discussed plan of care with primary team   Documenting the encounter.

## 2025-02-21 NOTE — CHART NOTE - NSCHARTNOTEFT_GEN_A_CORE
Infectious Diseases Anti-infective Approval Note    Medication:  Ertapenem  Dose:  1 g  Route:  IV  Frequency:  q24h  Duration**:  2 d (last day 2/23)  Purpose:  (check one)       Empiric pending cultures:       Empiric, no culture data:       Final duration:  X    Dose may be adjusted as needed for alterations in renal function.    *THIS IS NOT AN INFECTIOUS DISEASES CONSULTATION*    **Indicates duration of approval, not necessarily duration of treatment

## 2025-02-21 NOTE — PROGRESS NOTE ADULT - SUBJECTIVE AND OBJECTIVE BOX
HPI:  Unknown age male, unknown past medical history (reported stroke and MI by coworkers) presented to [facility] with AMS, Pt was working at [employer] when he was found down by coworkers. EMS called and pt brought to [facility] ED. Intubated, sedated, started on cardene for SBPs in 200s. CT head showed brain stem bleed. Transferred to NSICU for further management.  (30 Sep 2024 12:55)    OVERNIGHT EVENTS: GEORGE overnight, neuro stable.       Hospital Course:   : transferred from [facility]. A line placed. Versed dc'd. Cy Rader at bedside, states pt has family in [place], cannot confirm medications or PMH other than stroke and MI. 250cc bolus 3% given. LR switched to NS. hydralazine 25q8 started, 3% started, switched propofol to precedex   10/1: stability CTH done. Added labetalol, started TF. Palliative consulted. ethics consulted to determine surrogate. febrile 103, pan cx sent  10/2: BD 2, GEORGE overnight. TF resumed. Desatt'd to 80s, FiO2 inc. to 50. Fentanyl given, ABG, CXR ordered. Maxxed on precedex, started on propofol for DARIEN -4 - -5. Precedex dc'd. Duonebs, mucomyst, hypertonic added. 3% dc'd. Cardene dc'd. Start vanc/CTX. Increased labetalol 200q8. MRSA negative, dc'd vanc. ETT pulled back 2cm x 2, good positioning after confirmatory chest xray. Ethics attempting to establish HCP with family. Na 159, starting FW 250q6 for range 150-155.   10/3: BD3, GEORGE o/n, neuro stable. Na elevating, FW increased to 300q6. Dc'd bowel reg for diarrhea. vEEG started. SQH 5000q8 tonight.   10/4: BD 4, albumn bolus, incr. LR to 80 2/2 incr. in Cr, LR to 10 0cc/hr for uptrending Cr. Started 7% hypersal for 48hrs and SL atropine for inline/oral thick secretions. Dc'd CTX and started ancef for MSSA in the sputum. Nephrology consulted for CKD, f/u recs. SBP 170s, given hydralazine 10mg IVP.   10/5: BD5, o/n 10mg IVP hydralazine given for SBP 170s and started on hydralazine 25q8 via OGT. 10mg IV push labetalol for SBP > 160s. RT placed for diarrhea.   10/6: BD6, o/n FW increased to 350q4 per nephrology recs. IV tylenol for temp 100.6, SBp 160s presumed uncomfortable.   10/7: BD7, overnight pancultured for temp 101.8F.   10/8: BD8. GEORGE. Cr bumped. decreased LR to 75cc/hr. Adding simethicone ATC. incr hydralazine 50mgTID. Incr labetalol 300mgTID. Na 145, decreased FWF to 250q6. Start precedex. FENa consistent with intrinsic kidney injury. Pend repeat renal US. Retaining up to 1.3L, bladder scans q6, straight cath PRN  10/9: BD 9. GEORGE overnight. Neuro stable. abd xray for distention w non-specific gas pattern, OGT to LIWS for morning. duonebs/mucomyst to q8 for improving secretions. Changed tube feeds to Jevity 1.5 20cc/hr, low rate due to abdominal distention, nepro dense and more difficult to digest. Tolerating CPAP, confirmed by ABG.   10/10: BD 10. GEORGE overnight. Neuro stable. (+) gabriel for urinary retention on bladder scan. inc TF to goal rate of 40cc/hr. family leaning toward pursuing trach/PEG. 1/2 amp for FS 81.   10/11: BD 11. GEORGE overnight. Neuro stable. Trach/PEG consults placed.   10/12: BD 12. GEORGE overnight. Neuro stable. MRI brain complete.   10/13: BD 13. Increase flomax. Hold SQH after PM dose for trach tm. IVL.   10/14: BD 14. GEORGE overnight, remains on AC/VC. Gabriel placed for urinary retention. Dc'd free water.  S/p trach with pulm. NGT placed and CXR confirmed in good position.   10/15: BD 15, GEORGE ovn. resumed feeds. spiked 101, pan cx sent.   10/16: BD 16. GEORGE ovn. Lokelma 5mg for K+ 5.4. Started vanc q 24/zosyn for empiric PNA coverage, IVF to 100/hr. PEG held for fever.   10/17: BD 17,  ordered serum osm and urine osm for am. Started sinemet for neurostimulation. Increased cardura to 0.8. Started FW 100q4, dc'd IVF. MRSA negative, dc'd vanc. NGT replaced d/t coiling.   10/18: BD 18, GEORGE overnight, neuro stable. Amantadine added for neurostim. zosyn changed to unasyn for acinetobacter baumannii, failed TOV and required SC  10/19: BD 19, GEORGE ovn. cardura 2mg added for retention. labetalol decreased 200q8, hydralazine decreased 25q8. Gabriel replaced.   10/20: BD20, GEORGE overnight. NGT dislodged, replaced. PEG tomorrow w/ gen surg, FW increased to 150q4 and labetalol decreased to 100q8, lokelma given for hyperkalemia.   10/21: BD 21. POD0 PEG placement with Gen surg. decr labetolol to 50q8, incr. cardura to 0.4, started lokelma and phoslo, dc gabriel POD0 PEG placement with Gen surg.  10/22: BD 22. Plan to start TF today via PEG. dc labetalol, Following ophtho recs. Increased apnea settings - found to be in cheyne-moe respiration. CPAP 5/5.  10/23: BD 23. hydralazine d/c'd, trach collar trial today. Rectal tube placed at 6am.  10/24: BD24, o/n lokelma held due to diarrhea. Free water 100q6 resumed. dc'd tamsulosin, amantadine. Incr'd cardura to 8mg qhs. Dc'd FW. Switched jevity to nepro. gabriel placed for high urine output. Started SL atropine for oral secretions. Dc'd free water.  10/25: BD25, o/n decreased suctioning requirements to > q4hrs, GEORGE. Cr improving, cont phoslo, lokelma held at this time. Gabriel placed yest, cont. Tolerating trach collar. Given 500cc plasmalyte bolus for ANIKA. Dc'd sinemet.   10/26: BD26, o/n resumed lokelma 5mg daily and resumed 100cc free water q6hrs. Change in neuro status with new right pupillary dilation with anisocoria (right pupil 6mm fixed and left pupil 3mm briskly reactive). Given 23.4% NaCl bullet, taken for emergent CTH showing mostly resolved pontine hemorrhage, continued brainstem hypodensity likely edema d/t hemorrhage, no new hemorrhage or infarct, no herniation, mild increase in size of left lateral ventricle. Vitals remaine stable. Na goal > 140.   10/27: BD27, o/n GEORGE.Neuro stable. Pend stepdown with airway bed.   10/28: BD 28. GEORGE overnight. Neuro stable. Miralax ordered. Gabriel removed, pending TOV.  10/29: BD 29. GEORGE o/n. Given 2L NS over 8 hrs for increased BUN/Cr ratio. Gabriel placed for frequent straight cath.   10/30: BD 30.   10/31: BD 31. GEORGE overnight. Na 149, increased free water to 200q6. 1L NS for uptrending BUN.   : BD 32. GEORGE overnight. Given 1L NS for dehydration. Na 146, increased FW to 250q6.   : BD 33 GEORGE overnight, neuro stable, given 500cc bolus for net negative status and tachycardia   11/3: BD 34, GEORGE overnight, neuro stable. Patient remains tachycardic, EKG showing sinus tachycardia, given additional 500cc NS bolus. Febrile to 101.9F, pan cultured (without UA), CXR WNL, given tylenol.   : BD 35, GEORGE overnight, neuro stable. Given 1L NS for tachycardia. sputum (+) for stenotropohomas maltophilia.   : BD 36 GEORGE overnight, neuro stable. Vancomycin dc'd. Chest PT BID. ID consulted, cont zosyn.  : BD 37. blood cx + klebsiella dc zosyn changed to cefepime, CTAP ordered, rpt blood cx sent.    : BD 38. Pending CT A/P, given 250cc bolus and starting maintenance fluids overnight. Pending CT A/P after bolus   : BD 39. CT CAP negative for infection.   : BD 40. GEORGE overnight.  11/10: BD 41. GEORGE overnight. desat to 85 on trach collar, O2 inc to 10L and 100%, O2 sat inc to 95. pt tachy to 110s, euvolemic. given tylenol. ABG and CXR ordered. spiked fever, pancultured, RVP negative. AM ABG w pO2 79, rpt w pO2 79. pt appears comfortable, satting 94%.   : BD 42. GEORGE overnight. pt became tachy to 130s, desat to 90 on 100% FiO2 and 10L. suctioned, (+) productive cough. temp 101.4, given 1g IV tylenol and 500cc NS bolus for euvolemia. fever and HR downtrending. LE dopplers negative for dvt  : BD 43, GEORGE ovn, fever and HR downtrending, satting 97% 70% FIO2  : BD 44, GEORGE ovn. started standing tylenol x24 hours for tachycardia. desat to 80s, o2 increased. CXR stable, pending CTA PE protocol.   : BD 45, GEORGE overnight, neuro stable. resp therapy dec FiO2 to 70%.   11/15: BD 46, GEORGE overnight, neuro stable.  Rapid called for desaturation 30s, tachycardic 140s. Patient bagged, 100% fio2, heavily suctioned. CXR/POCUS unremarkable. ABG c/w desaturation. WBC 14.71. Afebrile. O2 improved to 90s and patient upgraded to ICU. ABG paO2 30s improved to 89 on vent. IV Tylenol x 1, sputum sent. Start protonix while o-n vent.   : BD 47. POCUS showed collapsable IVCF, given 1L bolus. Vanco/Cefpime added empriic for PNA, NGT feeds restarted. MRSA swab neg, Vanc DC'd.   : BD 48. GEORGE overnight. 1l bolus for tachycardia. Spiked to 101, cultured. 500cc bolus for tachycardia, tachy to 148 given 25mcg fentanyl, 250cc albumin, 1.5L bolus. 5 IV lopressor with response HR to 100s. +Stenotrophomonas on sputum cx.   : BD 49. GEORGE overnight. Consulted ID, cefepime switched to bactrim x7days. Started hydrochlorothiazine 12.5mg daily.  : BD 50. Tachy 120s, given tylenol and 500cc NS. Tolerating 5/5, switched to TCx. Holding phos binder. D/c Bactrim. D/c gabriel, f/u TOV. Dc'd PPI.   : BD 51. GEORGE ovn. 1600 satting low 90s, mildly tachy to 110s, afebrile, RR wnl. O2 improved to mid 90s while inc O2 to 100% on TCx. CPAP 5/5 placed back on.  : BD 52, GEORGE ovn, tolerating CPAP 5/5. Switch to trach collar during the day if tolerating well. HCTZ held for Cr bump, straight cath frequence increased to q4  : BD 53, GEORGE ovn. Resumed phoslo. Gabriel placed. Resumed HCTZ.   : BD 54. Holding tylenol in setting of possible fever, will require pan cx if febrile. Cr improved today. Cont CPAP. Bowel regimen held i/s/o diarrhea. FOBT negative.  : BD 55. GEORGE overnight. Neuro stable. HCTZ dc'ed, started lisinopril 5. Lokelma dc'ed for K 3.7.   : BD 56, GEORGE overnight. Neuro stable. dc'd lisinopril 5mg. Gabriel dc'd. TOV. 1545 noted to be hypotensive, MAP 50, in supine position on chair, HR 60s, afebrile, O2 96%. Given 1L cc NS bolus, placed back on bed in reverse trendelenberg, improved to map of 66. Neostick at bedside. Vitals check q1h. Dc'ed amlodipine. Failed TOV, bladder scan q6, sc prn. Added back Senna.   : BD 57, GEORGE overnight. Neuro stable. Dc'd phoslo.   : BD 58, GEORGE overnight. Neuro stable.    : BD 59. Gabriel replaced.   : GEORGE.  : GEORGE, neuro stable.   : BD 62, GEORGE overnight  : BD 63, GEORGE overnight.; Given 1L bolus of LR for uptrending BUN/Cr.  12/3: BD 64. Reinstated eye gtt/moisture chamber given increased Rt eye injection  : BD 65. GEORGE overnight. Attempted to speak with ophtho regarding eyelid weight/closure but no answer, full mailbox.   : BD 66, GEORGE overnight, bowel regimen increased and had BM.   : BD 67, GEORGE overnight, neuro stable.  : GEORGE overnight, neuro stable. /110s, given x1 hydralazine 10 mg IVP. Restarted home amlodipine 5mg.  : GEORGE. OOB to chair.     : GEORGE, mucomyst added for thick secretions, simethicone for abd distension, abd xray with stool burden, increased bowel regimen.   : GEORGE overnight.   : GEORGE overnight.   : GEORGE overnight  12/15: o/n Patient became tachycardic HR 120s and 10 minutes later O2 sat dropped as low as 89%. Patient suctioned without improvement in O2 sat and tube feeds found in suction catheter. TFs held.  STAT CXR ordered. STAT labs sent. Respiratory therapist called to bedside and patient trach connected to ventilator. After connection to ventilator and further suctioning O2 sat improved to 97% but patient HR remains 120-130s. Upgraded to NSICU for further management. Vancomycin and zosyn started. CTA  chest PE protocol and CTH ordered. Blood cultures sent.given 500cc bolus, rpt ABG sent pO2 243, CTH and CTA chest done. FS while NPO, FiO2 dec 50 pending ABG. sputum cx positive for few GPC and GNR.   : GEORGE overnight, restarted amlodipine, troponin 75   : GEORGE overnight, neuro stable. Attempted CPAP this morning, did not tolerate and back on full vent support. Dc'd Vanc. Tachycardia to 140s, noted extremities to be twitching along with jaw twitching. Given 2g of Keppra, total 4mg ativan and placed on EEG, full set of labs and lactate negative. Resumed trickle feeds at 20cc/hr. Given 250cc albumin for tachycardia.   : GEORGE overnight. Neuro stable. CTH stable. EEG negative and dc'd.   : GEORGE overnight. neuro stable. SIMV most of day, AC/VC at night.   : GEORGE overnight, neuro stable. remains on VC/AC  : GEORGE ovn. 1L bolus for tachy to 120s-130s.   : GEORGE ovn. Tachy to 120s, given tylenol and IVF. 2mg IV ativan given for L jaw twitching. Sx resolved for 3 minutes and started again. Epilepsy contacted. Given 2mg IV ativan. Continued twitching. Increased keppra to 1500mg BID, 2mg ativan given. Propranolol started for refractory tachycardia. vEEG ordered. albumin given. POCUS performed, no b lines. Started on fosphenytoin, loaded w 20mg/kg then 100mg TID. febrile, pancx, started cefepime 2g q8, vancomycin  : GEORGE ovn. +focal motor seizures on eeg. ID consulted. Vancomycin dc'ed as per ID.  : GEORGE ovn, neuro stable. Baclofen 5 mg q12 started for hiccups. Na 129 from 134. Urine lytes c/w SIADH. Given 250cc 3% bolus. CT chest for infection w/u - f/u read. Repeat Na 136. UA negative  : GEORGE ovn.   : GEORGE ovn  : GEORGE overnight. Blood cx neg @ 4 days, DC cefepime per medicine (sputum colonized).   : Desaturation o/n to 80's, CXR obtained, pulse ox changed and sats resolved. Na 133 from 138, 250cc 3% bolus given. Cefepime resumed until  per ID. Rpt Na 138.  : GEORGE overnight. Na stable.   : GEORGE overnight. Family meeting with son, pt now DNR.   : GEORGE overnight.   : GEORGE overnight, neuro stable.  : GEORGE overnight, neuro stable. FW decreased to 100q6.   1/3: GEORGE overnight, neuro stable. fosphenytoin IV changed to pheytoin PO via PEG per epilepsy recommendations  : GEORGE overnight, neuro stable. FW incr. to 100q4  : GEORGE overnight, neuro stable.   : GEORGE overnight, neuro stable   : GEORGE overnight, neuro stable. BUN/Cr increased, increased FW to 200q6. Given 1L bolus of LR over 2 hours. Gabriel d/c'd, voiding, continue bladder scan q6.   : GEORGE overnight, neuro stable. BUN/Cr improving.  : GEORGE overnight, neuro stable.   1/10: GEORGE overnight, neuro stable.   : GEORGE overnight, neuro stable, vent settings stable.   : GEORGE overnight, neuro stable. Febrile to 102F, f/u pan cx, chest xray, given tylenol. Zosyn started.   : GEORGE overnight, neuro stable, cont Zosyn for presumed PNA, prelim sputum cx growing; few GS neg diplococci, mod GS neg rods, rare GS + rods, fever trend improved. Free water increased to [id number].   : GEORGE overnight. pending renal US for elevated Cr  1/15: GEORGE ovn. renal US complete.   : GEORGE overnight, neuro stable   : GEORGE overnight, neuro stable   : GEORGE overnight, neuro stable.   : GEORGE overnight, neuro stable. Propranolol dec to 5q8.   : GEORGE overnight, neuro stable. Weaned propranolol to 5mg BID.   : GEORGE ovn, in AM having rapid vertical eye movements with facial twitching, 2g total keppra given for AM dose and phenytoin level ordered, vital signs stable. CTH stable. Phenytoin increased to 100/100/150mg per epilepsy  : GEORGE ovn. IV hydral x 1 for SBP 170s.   : Cont to have focal motor seizures. Per epilepsy, changed phenytoin dose to 100/100/200.   : Phenytoin lvl tmrw AM. Chest Xray completed due to temp 100.2F  : GEORGE overnight. Incr dilantin morning and afternoon per epilepsy.   : GEORGE overnight. Sustained focal twitching noticed by nursing. Ativan 8mg in total given. Fosphenytoin 1500mg IV given. Placed on EEG. Epilepsy following. TFs held. Phenytoin increased to 150/150/200.   : o/n CTH completed due to continued lethargy after ativan given yesterday afternoon. TFs resumed. 500cc bolus NS given for SBP 90s. EEG dc'ed, discussed w/ Dr. Tomlin. Febrile 101F, pan cx sent. Likely left sided infiltrate on CXR, c/f aspiration PNA. Started on vanc/zosyn. MRSA swab pending.   : Neuro stable, on vanc/zosyn. +UTI on UA, MRSA negative. Vanc dc'd. RVP negative. Zosyn increased to pseudomonal dosing.   : GEORGE overnight, neuro stable.  : GEORGE overnight, neuro stable. Dc'd zosyn due to resistance, switched to Levaquin.  : GEORGE ovenight, neuro stable.   : Brief desat. Improved with neb and reposititioning. ABG and POCUS wnl.   : GEORGE overnight  : GEORGE overnight  : GEORGE ovn  : GEORGE ovn. L eye redness noted, started erythromycin and lacrilube to L eye as well. LR @ 100 cc/hr x 10 hours for uptrending BUN/Cr. Resumed bowel reg.   : GEORGE overnight. Escalated bowel regimen.   : GEORGE overnight.  2/10: GEORGE overnight. Social work to start working on SNF placement. Pending appointment with [employer] to come to hospital for biometrics.  : GEORGE overnight. Neuro stable. Potassium improved (downtrending).   : GEORGE overnight. Neuro stable. Lokelma 5mg x 1. Decrease FW to 200q8. 1L NS bolus given for hydration, uptrending BUN/Cr. Wound care rec barrier wipes for penile wound.  TFs changed to Flurry renal formula per nutrition.  : GEORGE overnight. Neuro stable. Corneal abrasion noted on exam, ophtho re-consulted for L eye, eye drops changed per recs to q4. Pulm contacted for trach exchange, attempted at bedside but patient unable to tolerate with minimal sedation, will plan for tomorrow.   : GEORGE overnight, given 1L of NS for low BP after fentanyl with improvement. Neuro exam stable, pend trach exchange today with pulm. Pend optho assessment for left eye. Trache exchanged. Desaturation during placement to 90%. FiO2 incr'd 55%. CXR negative for pneumothorax.   2/15: GEORGE overnight. Dest'd 88%: suctione, incr'd FiO2, PEEP. CXR/ABG/POCUS done with B-lines, 20mg IV lasix given.   : GEORGE ovn, BAL from  during trach exchange growing moderate stenotrophomonas maltophilia, desat to 87% red rubber suctioning performed with improvements in O2 sat to 98%, Desturated again to 76%. Deep suctioned, ABG/CXR and lasix, incr. vent settings, Saturating 95%. Pulm rec'd dc mucomyst. Increased clonus while having another episode of desaturation, given 4mg ativan, clonus broke. O2 sats normalized with deep suction. Back on vEEG per gen neuro. CT chest and sputum cx obtained per ID.   : GEORGE ovn. Blood cultures drawn. ID rec'd treatment for stenotrophomonas. EEG with concern for singular seizure activity. Keppra and Phenytoin levels were drawn. 2x blood cultures sent prior to starting DS Bactrim and Minocycline PO as per ID recs. Additional standing ativan added per epilepsy.   : GEORGE ovn. Per epilepsy ativan decreased to 1mg q12. EEG dc'd.  : GEORGE ovn. 500cc bolus for Cr bump over 5 hrs. Dc'd bactrim and minocycline. Start ertapanem as per ID.   : GEORGE overnight, neuro stable. Cr bump likely / Bactrim, will monitor. TF adjusted off minocycline. DC propranolol and baclofen. Sent photo of L eye to ophtho, increased drops to q4.  : GEORGE overnight, neuro stable.       Vital Signs Last 24 Hrs  T(C): 36.9 (2025 22:00), Max: 36.9 (2025 04:47)  T(F): 98.5 (2025 22:00), Max: 98.5 (2025 04:47)  HR: 92 (2025 23:45) (70 - 92)  BP: 108/64 (2025 23:45) (107/69 - 145/86)  BP(mean): 80 (2025 23:45) (80 - 111)  RR: 16 (2025 23:45) (14 - 16)  SpO2: 95% (2025 23:45) (92% - 100%)    Parameters below as of 2025 23:45  Patient On (Oxygen Delivery Method): ventilator    O2 Concentration (%): 40    I&O's Detail    2025 07:01  -  2025 07:00  --------------------------------------------------------  IN:    Enteral Tube Flush: 180 mL    Free Water: 800 mL    Miscellaneous Tube Feedin mL    Sodium Chloride 0.9% Bolus: 500 mL  Total IN: 2704 mL    OUT:    Voided (mL): 1125 mL  Total OUT: 1125 mL    Total NET: 1579 mL      2025 07:  -  2025 00:10  --------------------------------------------------------  IN:    Enteral Tube Flush: 30 mL    Free Water: 400 mL    Miscellaneous Tube Feedin mL  Total IN: 1283 mL    OUT:    Voided (mL): 1000 mL  Total OUT: 1000 mL    Total NET: 283 mL        I&O's Summary    2025 07:01  -  2025 07:00  --------------------------------------------------------  IN: 2704 mL / OUT: 1125 mL / NET: 1579 mL    2025 07:  -  2025 00:10  --------------------------------------------------------  IN: 1283 mL / OUT: 1000 mL / NET: 283 mL        PHYSICAL EXAM:  Constitutional: NAD, lying in bed.  HEENT: Pupils equal, round, reactive to light.   Respiratory: +Trach to mechanical vent. No respiratory distress, symmetric chest rise  Cardiovascular: Regular rate and rhythm    Gastrointestinal: +PEG, Abdomen soft, nondistended.  Neurological:  AAOX0-1. Opens eyes. Tracks vertically  Motor: RUE squeezes hand to command, LUE 0/5, B/l LE w/d.   Sensation: unable to assess  Extremities: Warm, well perfused.    TUBES/LINES:  [] CVC  [] A-line  [] Lumbar Drain  [] Ventriculostomy  [] Other    DIET:  [] NPO  [] Mechanical  [x] Tube feeds    LABS:    Urinalysis Basic - ( 2025 05:30 )    Color: x / Appearance: x / SG: x / pH: x  Gluc: 100 mg/dL / Ketone: x  / Bili: x / Urobili: x   Blood: x / Protein: x / Nitrite: x   Leuk Esterase: x / RBC: x / WBC x   Sq Epi: x / Non Sq Epi: x / Bacteria: x          CAPILLARY BLOOD GLUCOSE          Drug Levels: [] N/A  Phenytoin Level, Serum: 18.5 ug/mL ( @ 12:57)    CSF Analysis: [] N/A      Allergies    Allergy Status Unknown    Intolerances      MEDICATIONS:  Antibiotics:  ertapenem  IVPB 1000 milliGRAM(s) IV Intermittent every 24 hours    Neuro:  acetaminophen   Oral Liquid .. 650 milliGRAM(s) Oral every 6 hours PRN  levETIRAcetam 1500 milliGRAM(s) Oral every 12 hours  LORazepam     Tablet 1 milliGRAM(s) Oral every 12 hours  LORazepam   Injectable 2 milliGRAM(s) IV Push once PRN  phenytoin   Suspension 150 milliGRAM(s) Oral <User Schedule>  phenytoin   Suspension 200 milliGRAM(s) Oral <User Schedule>    Anticoagulation:  heparin   Injectable 5000 Unit(s) SubCutaneous every 8 hours    OTHER:  albuterol/ipratropium for Nebulization 3 milliLiter(s) Nebulizer every 4 hours  amLODIPine   Tablet 5 milliGRAM(s) Oral daily  artificial tears (preservative free) Ophthalmic Solution 1 Drop(s) Both EYES every 4 hours  chlorhexidine 0.12% Liquid 15 milliLiter(s) Oral Mucosa every 12 hours  doxazosin 8 milliGRAM(s) Oral at bedtime  erythromycin   Ointment 1 Application(s) Both EYES every 4 hours  fluticasone propionate 50 MICROgram(s)/spray Nasal Spray 1 Spray(s) Both Nostrils two times a day  influenza   Vaccine 0.5 milliLiter(s) IntraMuscular once  pantoprazole   Suspension 40 milliGRAM(s) Oral daily  petrolatum Ophthalmic Ointment 1 Application(s) Both EYES every 4 hours  polyethylene glycol 3350 17 Gram(s) Oral two times a day  senna 2 Tablet(s) Oral at bedtime  sodium chloride 0.65% Nasal 1 Spray(s) Both Nostrils two times a day    IVF:    CULTURES:  Culture Results:   No growth at 72 Hours ( @ 12:10)  Culture Results:   No growth at 72 Hours ( @ 12:10)    RADIOLOGY & ADDITIONAL TESTS:      ASSESSMENT: 44M PMH ?stroke/MI present to [facility] after collapsing at work. Decorticate posturing, vomiting, intubated for airway protection. 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 Transferred to [facility] for further management. s/p trach 10/14. s/p peg 10/21. Re-upgrade to ICU 2/2 desaturation event and suctioning requirements 11/15. Re-upgrade to NSICU 12/15 2/2 desaturation and tachycardia.    Neuro:  - neuro/vitals q4h  - pain control: tylenol prn  - seizure tx: keppra 1500mg BID, phenytoin 150/150/200, ativan 1mg q12  - s/p vEEG (10/3-), (10/17-10/19), (-), (-), (-), (-) +seizure on eeg   - CTH : enlarged pontine hemorrhage, CTH 10/3: stable, CTH 10/25: mostly resolved pontine hemorrhage, CTH 12/15: R mastoid air cell opacification; acute otitis media vs sterile effusion, CTH : stable. CTH  stable, CTH  stable  - MRI brain 10/12: parenchymal hemorrhage, acute/subacute R cerebellar stroke      CV:  - -160  - tachycardia resolved, s/p propranolol  - HTN: amlodipine 5mg  - echo () EF 75%, repeat : 57%    PULM:  - s/p trach exchange with pulm at bedside  to vent, AC/VC 40/400/14/6  - Secretions: duonebs/chest PT q4h  - CT chest : Near complete collapse b/l lower lobes. Patchy opacity within LLL/ALONDRA  - pulmonology consulted, recs appreciated    GI:  - TFs via PEG, [facility]  - bowel regimen, last BM     Renal:  - IVL  - FW 200q8 for hydration  - Voiding, SC prn  - CKD: trend BUN/Cr  - renal US 10/1, 10/8, 1/15: Increased bilateral renal echogenicity consistent with medical renal disease    Endo:  - A1c 5.4    Heme:  - DVT ppx: SCDs, SQH 5000u q8h   - LE dopplers negative     ID:  - sputum  +Enterobacter cloacae: Ertapenem (- ) per ID recs, likely conolonized with stenotrophomonas  - +klebsiella UTI s/p levaquin (-2/3)  - empiric PNA: cefepime (-), s/p vanc (-), s/p zosyn (12/15-), s/p vanc (12/15-), s/p zosyn ( -), s/p DS bactrim 2 tabs TID, 200 mg minocycline BID ()  - +klebsiella UTI s/p   - s/p Stenotrophomonas maltophilia PNA: s/p Bactrim (-) s/p Cefepime (-)  - 11/3, (+) sputum for stenotrophomas maltophlia, blood cx (+) klebsiella, cefepime 2gq12 ( - )   - S/p Ancef (10/4-10/14) for PNA, and s/p Unasyn (10/18-10/23) +actinobacter baumanii     MISC:  - BL keratitis: BL erythromycin ointment, artificial tears, lacrilube q4, moisture chamber   - Penile wound: wound care rec barrier wipes     Dispo: SDU status, DNR, pending SNF    D/w Dr. D'Amico

## 2025-02-21 NOTE — PROGRESS NOTE ADULT - ASSESSMENT
45yo M w/ reported PMHx of MI and previous strokes, who was admitted to [facility] on 09/30 for AMS, found to have acute large parenchymal hemorrhage within nabil with mass effect (+ acute/subacute right cerebellar infarct) in setting of hypertensive emergency, and R cerebellar stroke , c/b poor neurologic recovery, now trach/vent dependent/PEG, with recurrent fever/infections and ICU readmissions a/w focal status epilepticus. 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

## 2025-02-21 NOTE — PROGRESS NOTE ADULT - SUBJECTIVE AND OBJECTIVE BOX
SUBJECTIVE :   non verbal  moves eyes to the right   no further desaturations overnight         OBJECTIVE:  Vital Signs Last 24 Hrs  T(C): 36.9 (21 Feb 2025 09:02), Max: 37.5 (21 Feb 2025 04:50)  T(F): 98.4 (21 Feb 2025 09:02), Max: 99.5 (21 Feb 2025 04:50)  HR: 86 (21 Feb 2025 08:30) (72 - 102)  BP: 112/73 (21 Feb 2025 08:10) (108/64 - 145/86)  BP(mean): 88 (21 Feb 2025 08:10) (80 - 111)  RR: 18 (21 Feb 2025 08:10) (14 - 18)  SpO2: 97% (21 Feb 2025 08:30) (92% - 100%)    Parameters below as of 21 Feb 2025 08:30  Patient On (Oxygen Delivery Method): ventilator    O2 Concentration (%): 40      PHYSICAL EXAM:  Gen:  awake , in bed    HEENT: trach in place   CV: RRR, +S1/S2  Pulm: adequate respiratory effort, no increase in work of breathing  Abd: soft, ND  Skin: warm and dry,   Neuro: awake, does not talk, opening eyes to voice     LABS:                                                                     10.8   6.69  )-----------( 228      ( 21 Feb 2025 06:50 )             32.6     02-21    136  |  100  |  52[H]  ----------------------------<  122[H]  4.4   |  23  |  1.49[H]    Ca    9.6      21 Feb 2025 06:50  Phos  5.2     02-21  Mg     2.3     02-21    TPro  7.2  /  Alb  3.4  /  TBili  0.2  /  DBili  <0.1  /  AST  12  /  ALT  16  /  AlkPhos  188[H]  02-20              2/16 CT chest w/o contrast: Near complete collapse both lower lobes. Patchy opacity within the   aerated left lower lobe as well as the left upper lobes may be infectious   or inflammatory.    2/13 BAL cx: stenotrophomonas   2/16 RVP/COVID: negative     MEDICATIONS  (STANDING):  albuterol/ipratropium for Nebulization 3 milliLiter(s) Nebulizer every 4 hours  amLODIPine   Tablet 5 milliGRAM(s) Oral daily  artificial tears (preservative free) Ophthalmic Solution 1 Drop(s) Both EYES every 4 hours  chlorhexidine 0.12% Liquid 15 milliLiter(s) Oral Mucosa every 12 hours  doxazosin 8 milliGRAM(s) Oral at bedtime  ertapenem  IVPB 1000 milliGRAM(s) IV Intermittent every 24 hours  erythromycin   Ointment 1 Application(s) Both EYES every 4 hours  fluticasone propionate 50 MICROgram(s)/spray Nasal Spray 1 Spray(s) Both Nostrils two times a day  heparin   Injectable 5000 Unit(s) SubCutaneous every 8 hours  influenza   Vaccine 0.5 milliLiter(s) IntraMuscular once  levETIRAcetam 1500 milliGRAM(s) Oral every 12 hours  LORazepam     Tablet 1 milliGRAM(s) Oral every 12 hours  pantoprazole   Suspension 40 milliGRAM(s) Oral daily  petrolatum Ophthalmic Ointment 1 Application(s) Both EYES every 4 hours  phenytoin   Suspension 150 milliGRAM(s) Oral <User Schedule>  phenytoin   Suspension 200 milliGRAM(s) Oral <User Schedule>  polyethylene glycol 3350 17 Gram(s) Oral two times a day  senna 2 Tablet(s) Oral at bedtime  sodium chloride 0.65% Nasal 1 Spray(s) Both Nostrils two times a day    MEDICATIONS  (PRN):  acetaminophen   Oral Liquid .. 650 milliGRAM(s) Oral every 6 hours PRN Temp greater or equal to 38C (100.4F), Mild Pain (1 - 3)  LORazepam   Injectable 2 milliGRAM(s) IV Push once PRN Seizure Activity (NOTIFY PROVIDER PRIOR TO GIVING)

## 2025-02-21 NOTE — PROGRESS NOTE ADULT - SUBJECTIVE AND OBJECTIVE BOX
INTERVAL HPI/OVERNIGHT EVENTS:    ROS: unobtainable - somnolent/locked in      ANTIBIOTICS/RELEVANT:          Vital Signs Last 24 Hrs  T(C): 36.7 (21 Feb 2025 17:37), Max: 37.5 (21 Feb 2025 04:50)  T(F): 98 (21 Feb 2025 17:37), Max: 99.5 (21 Feb 2025 04:50)  HR: 90 (21 Feb 2025 16:30) (78 - 102)  BP: 114/74 (21 Feb 2025 16:00) (106/84 - 117/71)  BP(mean): 90 (21 Feb 2025 16:00) (80 - 92)  RR: 18 (21 Feb 2025 16:00) (16 - 18)  SpO2: 93% (21 Feb 2025 16:30) (93% - 100%)    Parameters below as of 21 Feb 2025 16:30  Patient On (Oxygen Delivery Method): ventilator    O2 Concentration (%): 40    PHYSICAL EXAM:  Constitutional:  Somnolent  HEENT:  NC, ointment in eyes, PERRL.  No nasal exudate or sinus tenderness;  Unable to visualize pharynx.  Sustained facial twitching  Neck:  +trach  Respiratory:  Clear bilaterally anteriorly  Cardiovascular:  RRR, S1S2, no murmur appreciated  Gastrointestinal:  +PEG, normoactive BS, soft, NT, no masses, guarding or rebound.  No HSM  Extremities:  No edema        LABS:                        10.8   6.69  )-----------( 228      ( 21 Feb 2025 06:50 )             32.6         02-21    136  |  100  |  52[H]  ----------------------------<  122[H]  4.4   |  23  |  1.49[H]    Ca    9.6      21 Feb 2025 06:50  Phos  5.2     02-21  Mg     2.3     02-21    TPro  7.2  /  Alb  3.4  /  TBili  0.2  /  DBili  <0.1  /  AST  12  /  ALT  16  /  AlkPhos  188[H]  02-20      Urinalysis Basic - ( 21 Feb 2025 06:50 )    Color: x / Appearance: x / SG: x / pH: x  Gluc: 122 mg/dL / Ketone: x  / Bili: x / Urobili: x   Blood: x / Protein: x / Nitrite: x   Leuk Esterase: x / RBC: x / WBC x   Sq Epi: x / Non Sq Epi: x / Bacteria: x        MICROBIOLOGY:        RADIOLOGY & ADDITIONAL STUDIES:

## 2025-02-21 NOTE — PROGRESS NOTE ADULT - PROBLEM SELECTOR PLAN 3
_Acute parenchymal hemorrhage within nabil with mass effect (+ acute/subacute right cerebellar infarct) in setting of hypertensive emergency.   - MRI brain also demonstrated acute/subacute R cerebellar stroke.   - No Neurosurgical Interventions were performed   -Persistent encephalopathy  -management as per primary team   -hospice eligible if within GOC

## 2025-02-21 NOTE — PROGRESS NOTE ADULT - ASSESSMENT
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  He is undocumented so they can’t place him.  He is s/p trach/PEG s/p multiple antibiotic courses for presumed pneumonia (including Acinetobacter), Klebsiella aerogenes bacteremia (source not identified), respiratory tract colonization by Stenotrophomonas, Klebsiella aerogenes VAP in Dec (resolved without treating Steno).  He was treated with pip-tazo from 1/12-1/30 for fever, presumed pneumonia.  CXR showed R basilar atelectasis, sputum culture grew mixed GNRs and NRF.  He was treated with levofloxacin from 1/30-2/3 for UTI, urine culture grew >10^5 Klebsiella aerogenes. On 2/13, he underwent bronch in prep for trach exchange that he subsequently did not tolerate. Culture from bronch grew Stenotrophomonas, with which he has been colonized for months.  Episodes of desaturation following trach exchange on 2/14 CT chest on 1/17 showed near complete collapse both lower lobes and patchy opacity within the aerated left lower lobe as well as the left upper lobes may be infectious or inflammatory.  He was started on TMP/SX and minocycline on 2/17 based upon bronch culture from 2/13.  Sputum culture from 2/16 grew Enterobacter cloacae complex in addition to the Stenotrophomonas.  Suspect Enterobacter is the culprit, unclear that Stenotrophomonas is causing disease.  He now has increased serum Cr - likely due to TMP/SX, either due to increased tubular resorption of creatinine (pseudo) or real decrease in renal function.  Changed to ertapenem 1/19.  Recognize that ertapenem may decrease seizure threshold but is considered relatively rare event.  Other options (cefepime, high dose TMP/SX) are worse and would expect seizures to stop if ertapenem is stopped.  Suggest:  - F/U blood cultures from 2/17  - Continue ertapenem 1 g IV q24h to complete 7 d course from start of TMP/SX+kelsea (2/17-2/23)  Please recall if further ID input is desired, team 2.

## 2025-02-21 NOTE — PROGRESS NOTE ADULT - SUBJECTIVE AND OBJECTIVE BOX
[facility] and Palliative [facility]  Silvia Goldsmith, Geriatrics & Palliative Care NP  Contact Info: Call [phone] (HEAL Line) or message on [employer] Teams    SUBJECTIVE/OBJECTIVE:  interval events reviewed. Pt seen and examined. Pt with eyes open. No tracking. No obvious, purposeful response to stimuli.     ALLERGIES: Allergy Status Unknown    MEDICATIONS: REVIEWED  MEDICATIONS  (STANDING):  albuterol/ipratropium for Nebulization 3 milliLiter(s) Nebulizer every 4 hours  amLODIPine   Tablet 5 milliGRAM(s) Oral daily  artificial tears (preservative free) Ophthalmic Solution 1 Drop(s) Both EYES every 4 hours  chlorhexidine 0.12% Liquid 15 milliLiter(s) Oral Mucosa every 12 hours  doxazosin 8 milliGRAM(s) Oral at bedtime  ertapenem  IVPB 1000 milliGRAM(s) IV Intermittent every 24 hours  erythromycin   Ointment 1 Application(s) Both EYES every 4 hours  fluticasone propionate 50 MICROgram(s)/spray Nasal Spray 1 Spray(s) Both Nostrils two times a day  heparin   Injectable 5000 Unit(s) SubCutaneous every 8 hours  influenza   Vaccine 0.5 milliLiter(s) IntraMuscular once  levETIRAcetam 1500 milliGRAM(s) Oral every 12 hours  LORazepam     Tablet 1 milliGRAM(s) Oral every 12 hours  pantoprazole   Suspension 40 milliGRAM(s) Oral daily  petrolatum Ophthalmic Ointment 1 Application(s) Both EYES every 4 hours  phenytoin   Suspension 150 milliGRAM(s) Oral <User Schedule>  phenytoin   Suspension 200 milliGRAM(s) Oral <User Schedule>  polyethylene glycol 3350 17 Gram(s) Oral two times a day  senna 2 Tablet(s) Oral at bedtime  sodium chloride 0.65% Nasal 1 Spray(s) Both Nostrils two times a day    MEDICATIONS  (PRN):  acetaminophen   Oral Liquid .. 650 milliGRAM(s) Oral every 6 hours PRN Temp greater or equal to 38C (100.4F), Mild Pain (1 - 3)  LORazepam   Injectable 2 milliGRAM(s) IV Push once PRN Seizure Activity (NOTIFY PROVIDER PRIOR TO GIVING)    Analgesic Use (Scheduled and PRNs) for past 24 hours (6a-6a):      levETIRAcetam   1500 milliGRAM(s) Oral (02-21-25 @ 05:27)   1500 milliGRAM(s) Oral (02-20-25 @ 17:48)    LORazepam     Tablet   1 milliGRAM(s) Oral (02-21-25 @ 12:06)   1 milliGRAM(s) Oral (02-21-25 @ 02:50)    phenytoin   Suspension   150 milliGRAM(s) Oral (02-21-25 @ 12:06)   150 milliGRAM(s) Oral (02-21-25 @ 05:00)    phenytoin   Suspension   200 milliGRAM(s) Oral (02-20-25 @ 19:36)    ROS: EBER, pt noncontributory.    CONSTITUTIONAL: NAD  Head: atraumatic  ENMT: Oral mucosa moist.  Left eye erythema  NECK: Supple, symmetric and without tracheal deviation   RESP: Trach to vent. No respiratory distress, no use of accessory muscles; coarse   CV: RRR, +S1S2, no JVD; + peripheral edema  GI: PEG, Soft, NT, ND, no rebound, no guarding  MSK: ext x4 flaccid  SKIN: intact  NEURO: Eyes open, - blink to threat  T(C): 36.4 (02-21-25 @ 14:31), Max: 37.5 (02-21-25 @ 04:50)  HR: 78 (02-21-25 @ 12:30) (76 - 102)  BP: 106/84 (02-21-25 @ 12:00) (106/84 - 117/71)  RR: 18 (02-21-25 @ 12:00) (16 - 18)  SpO2: 98% (02-21-25 @ 12:30) (92% - 100%)  Wt(kg): --      LABS: REVIEWED  CBC:                        10.8   6.69  )-----------( 228      ( 21 Feb 2025 06:50 )             32.6     CMP:    02-21    136  |  100  |  52[H]  ----------------------------<  122[H]  4.4   |  23  |  1.49[H]    Ca    9.6      21 Feb 2025 06:50  Phos  5.2     02-21  Mg     2.3     02-21    TPro  7.2  /  Alb  3.4  /  TBili  0.2  /  DBili  <0.1  /  AST  12  /  ALT  16  /  AlkPhos  188[H]  02-20      RADIOLOGY & ADDITIONAL STUDIES:     DISCUSSION OF CASE:    CARE COORDINATION:

## 2025-02-22 LAB
ANION GAP SERPL CALC-SCNC: 12 MMOL/L — SIGNIFICANT CHANGE UP (ref 5–17)
BUN SERPL-MCNC: 52 MG/DL — HIGH (ref 7–23)
CALCIUM SERPL-MCNC: 9.3 MG/DL — SIGNIFICANT CHANGE UP (ref 8.4–10.5)
CHLORIDE SERPL-SCNC: 99 MMOL/L — SIGNIFICANT CHANGE UP (ref 96–108)
CO2 SERPL-SCNC: 25 MMOL/L — SIGNIFICANT CHANGE UP (ref 22–31)
CREAT SERPL-MCNC: 1.42 MG/DL — HIGH (ref 0.5–1.3)
CULTURE RESULTS: SIGNIFICANT CHANGE UP
CULTURE RESULTS: SIGNIFICANT CHANGE UP
EGFR: 62 ML/MIN/1.73M2 — SIGNIFICANT CHANGE UP
EGFR: 62 ML/MIN/1.73M2 — SIGNIFICANT CHANGE UP
GLUCOSE SERPL-MCNC: 101 MG/DL — HIGH (ref 70–99)
HCT VFR BLD CALC: 31.2 % — LOW (ref 39–50)
HGB BLD-MCNC: 10.1 G/DL — LOW (ref 13–17)
MAGNESIUM SERPL-MCNC: 2.2 MG/DL — SIGNIFICANT CHANGE UP (ref 1.6–2.6)
MCHC RBC-ENTMCNC: 31 PG — SIGNIFICANT CHANGE UP (ref 27–34)
MCHC RBC-ENTMCNC: 32.4 G/DL — SIGNIFICANT CHANGE UP (ref 32–36)
MCV RBC AUTO: 95.7 FL — SIGNIFICANT CHANGE UP (ref 80–100)
NRBC BLD AUTO-RTO: 0 /100 WBCS — SIGNIFICANT CHANGE UP (ref 0–0)
PHOSPHATE SERPL-MCNC: 4.6 MG/DL — HIGH (ref 2.5–4.5)
PLATELET # BLD AUTO: 205 K/UL — SIGNIFICANT CHANGE UP (ref 150–400)
POTASSIUM SERPL-MCNC: 4.4 MMOL/L — SIGNIFICANT CHANGE UP (ref 3.5–5.3)
POTASSIUM SERPL-SCNC: 4.4 MMOL/L — SIGNIFICANT CHANGE UP (ref 3.5–5.3)
RBC # BLD: 3.26 M/UL — LOW (ref 4.2–5.8)
RBC # FLD: 12.1 % — SIGNIFICANT CHANGE UP (ref 10.3–14.5)
SODIUM SERPL-SCNC: 136 MMOL/L — SIGNIFICANT CHANGE UP (ref 135–145)
SPECIMEN SOURCE: SIGNIFICANT CHANGE UP
SPECIMEN SOURCE: SIGNIFICANT CHANGE UP
WBC # BLD: 6.28 K/UL — SIGNIFICANT CHANGE UP (ref 3.8–10.5)
WBC # FLD AUTO: 6.28 K/UL — SIGNIFICANT CHANGE UP (ref 3.8–10.5)

## 2025-02-22 PROCEDURE — 99233 SBSQ HOSP IP/OBS HIGH 50: CPT

## 2025-02-22 PROCEDURE — 99232 SBSQ HOSP IP/OBS MODERATE 35: CPT

## 2025-02-22 RX ADMIN — Medication 1 SPRAY(S): at 19:18

## 2025-02-22 RX ADMIN — IPRATROPIUM BROMIDE AND ALBUTEROL SULFATE 3 MILLILITER(S): .5; 2.5 SOLUTION RESPIRATORY (INHALATION) at 10:18

## 2025-02-22 RX ADMIN — ERYTHROMYCIN 1 APPLICATION(S): 5 OINTMENT OPHTHALMIC at 05:20

## 2025-02-22 RX ADMIN — AMLODIPINE BESYLATE 5 MILLIGRAM(S): 10 TABLET ORAL at 05:21

## 2025-02-22 RX ADMIN — Medication 1 APPLICATION(S): at 10:19

## 2025-02-22 RX ADMIN — Medication 1 DROP(S): at 13:17

## 2025-02-22 RX ADMIN — Medication 1 APPLICATION(S): at 18:19

## 2025-02-22 RX ADMIN — Medication 1 APPLICATION(S): at 01:01

## 2025-02-22 RX ADMIN — HEPARIN SODIUM 5000 UNIT(S): 1000 INJECTION INTRAVENOUS; SUBCUTANEOUS at 21:29

## 2025-02-22 RX ADMIN — HEPARIN SODIUM 5000 UNIT(S): 1000 INJECTION INTRAVENOUS; SUBCUTANEOUS at 13:16

## 2025-02-22 RX ADMIN — Medication 200 MILLIGRAM(S): at 20:25

## 2025-02-22 RX ADMIN — HEPARIN SODIUM 5000 UNIT(S): 1000 INJECTION INTRAVENOUS; SUBCUTANEOUS at 05:19

## 2025-02-22 RX ADMIN — Medication 150 MILLIGRAM(S): at 03:51

## 2025-02-22 RX ADMIN — Medication 1 DROP(S): at 10:18

## 2025-02-22 RX ADMIN — Medication 1 DROP(S): at 01:01

## 2025-02-22 RX ADMIN — ERTAPENEM SODIUM 120 MILLIGRAM(S): 1 INJECTION, POWDER, LYOPHILIZED, FOR SOLUTION INTRAMUSCULAR; INTRAVENOUS at 13:15

## 2025-02-22 RX ADMIN — LEVETIRACETAM 1500 MILLIGRAM(S): 10 INJECTION, SOLUTION INTRAVENOUS at 05:19

## 2025-02-22 RX ADMIN — ERYTHROMYCIN 1 APPLICATION(S): 5 OINTMENT OPHTHALMIC at 01:02

## 2025-02-22 RX ADMIN — Medication 1 DROP(S): at 05:20

## 2025-02-22 RX ADMIN — Medication 1 MILLIGRAM(S): at 13:15

## 2025-02-22 RX ADMIN — Medication 40 MILLIGRAM(S): at 11:19

## 2025-02-22 RX ADMIN — IPRATROPIUM BROMIDE AND ALBUTEROL SULFATE 3 MILLILITER(S): .5; 2.5 SOLUTION RESPIRATORY (INHALATION) at 14:08

## 2025-02-22 RX ADMIN — IPRATROPIUM BROMIDE AND ALBUTEROL SULFATE 3 MILLILITER(S): .5; 2.5 SOLUTION RESPIRATORY (INHALATION) at 18:15

## 2025-02-22 RX ADMIN — Medication 150 MILLIGRAM(S): at 11:18

## 2025-02-22 RX ADMIN — Medication 1 APPLICATION(S): at 21:30

## 2025-02-22 RX ADMIN — Medication 1 DROP(S): at 19:17

## 2025-02-22 RX ADMIN — Medication 1 SPRAY(S): at 05:21

## 2025-02-22 RX ADMIN — FLUTICASONE PROPIONATE 1 SPRAY(S): 50 SPRAY, METERED NASAL at 18:18

## 2025-02-22 RX ADMIN — LEVETIRACETAM 1500 MILLIGRAM(S): 10 INJECTION, SOLUTION INTRAVENOUS at 18:16

## 2025-02-22 RX ADMIN — Medication 15 MILLILITER(S): at 05:19

## 2025-02-22 RX ADMIN — Medication 1 DROP(S): at 21:29

## 2025-02-22 RX ADMIN — Medication 15 MILLILITER(S): at 18:15

## 2025-02-22 RX ADMIN — Medication 1 APPLICATION(S): at 05:20

## 2025-02-22 RX ADMIN — IPRATROPIUM BROMIDE AND ALBUTEROL SULFATE 3 MILLILITER(S): .5; 2.5 SOLUTION RESPIRATORY (INHALATION) at 21:29

## 2025-02-22 RX ADMIN — Medication 1 MILLIGRAM(S): at 01:01

## 2025-02-22 RX ADMIN — ERYTHROMYCIN 1 APPLICATION(S): 5 OINTMENT OPHTHALMIC at 21:30

## 2025-02-22 RX ADMIN — IPRATROPIUM BROMIDE AND ALBUTEROL SULFATE 3 MILLILITER(S): .5; 2.5 SOLUTION RESPIRATORY (INHALATION) at 05:19

## 2025-02-22 RX ADMIN — ERYTHROMYCIN 1 APPLICATION(S): 5 OINTMENT OPHTHALMIC at 18:20

## 2025-02-22 RX ADMIN — Medication 1 APPLICATION(S): at 13:16

## 2025-02-22 RX ADMIN — ERYTHROMYCIN 1 APPLICATION(S): 5 OINTMENT OPHTHALMIC at 13:16

## 2025-02-22 RX ADMIN — FLUTICASONE PROPIONATE 1 SPRAY(S): 50 SPRAY, METERED NASAL at 05:21

## 2025-02-22 RX ADMIN — ERYTHROMYCIN 1 APPLICATION(S): 5 OINTMENT OPHTHALMIC at 10:20

## 2025-02-22 RX ADMIN — DOXAZOSIN MESYLATE 8 MILLIGRAM(S): 8 TABLET ORAL at 21:29

## 2025-02-22 RX ADMIN — IPRATROPIUM BROMIDE AND ALBUTEROL SULFATE 3 MILLILITER(S): .5; 2.5 SOLUTION RESPIRATORY (INHALATION) at 01:03

## 2025-02-22 NOTE — PROGRESS NOTE ADULT - ASSESSMENT
44M with unclear PMH (?stroke, MI) who was found down at work, intubated for airway protection and found to have acute parenchymal hemorrhage within nabil with mass effect (+ acute/subacute right cerebellar infarct) in setting of hypertensive emergency, transferred to [facility] for further neurosurgical care. Hospital course c/b poor neurologic recovery s/p trach-PEG, AUR s/p gabriel, ANIKA on CKD c/b hyperkalemia, HAP s/p amp-sulbactam (EOT 10/23), K aerogenes bacteremia  treated with 2 weeks of Cefepime per ID , On 11/15 he became septic and was transferred to NSICU due to increased o2 requirements and needed Vent support , Trach Cultures + for Stenotrophomonas which was treated with 7 days of Bactrim per ID , has been weaned of Vent since 11/23 and is now on 10lts at 40% o2 through Trach Collar. Stepped up to the NSICU on 12/15 for hypoxia and tachycardia. Pt s/p  abx for 5 days. Pt now stepped down to 8Lach.    # Pontine hemorrhage  # Encephalopathy due to Intracranial Bleed   - Acute parenchymal hemorrhage within nabil with mass effect (+ acute/subacute right cerebellar infarct) in setting of hypertensive emergency.   - MRI brain also demonstrated acute/subacute R cerebellar stroke.   - No Neurosurgical Interventions were performed   - Trach and PEG Dependent    # Seizures  - Pt with episode of facial and leg twitching on 2/16 which resolved after the pt was given ativan 4mg x1. Off vEEG since 2/18   - Continue Seizure precautions   - Continue Keppra and phenytoin    # Hypertension  - Continue amlodipine 5mg daily with holding parameters    # Acute on Chronic respiratory failure   - s/p percutaneous trach by pulm on 10/14/24 - Trach Exchanged by Pulmonary on 2/14/25   - Pt's BAL on 2/13 growing Stenotrophomonas and Enterobacter Cloacae complex  - Continue vent support and chest PT  - F/u ID recommendations - Bactrim and Minocycline 2/17  switched to Ertapenem on 2/19 - End date 2/23 per ID   - Given numerous infectious foci, prolonged hospitalization, recurrent infections and need for broad spectrum antibiotics coverage, without the potential of further clinical recovery, would discuss consideration of no escalation of antibiotics past this current regimen.    # Neurogenic dysphagia.   - Pt s/p PEG placement by  surgery 10/21/24  - Continue TF per nutrition  - Continue aspiration precautions and elevation of HOB.    # Acute urinary retention.   - doxazosin 8mg qHS   - Chronic Gabriel     # Functional quadriplegia in setting of brainstem hemorrhage  - decub precautions  - care per nursing protocol     # ANIKA on CKD stage III   - Baseline Creatinine 1.1  -  ? Volume depletion + Bactrim induced   - Check Urine Lytes, if Pre renal can Give LR 125ml for 8 hours   - Continue to monitor creatinine and avoid nephrotoxic agents    # Anemia of Chronic Disease     # DVT prop  -Heparin SQ TID    # Dispo : Awaiting Transfer to [facility]      35 minutes spent on total encounter. The necessity of the time spent during the encounter on this date of service was due to:    Review of hospital course, labs, vitals, medical records.  Bedside exam and interview of the patient  Discussed plan of care with primary team   Documenting the encounter.

## 2025-02-22 NOTE — PROGRESS NOTE ADULT - SUBJECTIVE AND OBJECTIVE BOX
Patient is a 44y old  Male who presents with a chief complaint of brain stem bleed (22 Feb 2025 04:37)      SUBJECTIVE / OVERNIGHT EVENTS:  No acute events overnight.  Palliative care input appreciated regarding ongoing aggressive interventions especially in the absence of hope for clinical improvement.    Remains on vent 40%.  Afebrile.    MEDICATIONS  (STANDING):  albuterol/ipratropium for Nebulization 3 milliLiter(s) Nebulizer every 4 hours  amLODIPine   Tablet 5 milliGRAM(s) Oral daily  artificial tears (preservative free) Ophthalmic Solution 1 Drop(s) Both EYES every 4 hours  chlorhexidine 0.12% Liquid 15 milliLiter(s) Oral Mucosa every 12 hours  doxazosin 8 milliGRAM(s) Oral at bedtime  ertapenem  IVPB 1000 milliGRAM(s) IV Intermittent every 24 hours  erythromycin   Ointment 1 Application(s) Both EYES every 4 hours  fluticasone propionate 50 MICROgram(s)/spray Nasal Spray 1 Spray(s) Both Nostrils two times a day  heparin   Injectable 5000 Unit(s) SubCutaneous every 8 hours  influenza   Vaccine 0.5 milliLiter(s) IntraMuscular once  levETIRAcetam 1500 milliGRAM(s) Oral every 12 hours  LORazepam     Tablet 1 milliGRAM(s) Oral every 12 hours  pantoprazole   Suspension 40 milliGRAM(s) Oral daily  petrolatum Ophthalmic Ointment 1 Application(s) Both EYES every 4 hours  phenytoin   Suspension 150 milliGRAM(s) Oral <User Schedule>  phenytoin   Suspension 200 milliGRAM(s) Oral <User Schedule>  polyethylene glycol 3350 17 Gram(s) Oral two times a day  senna 2 Tablet(s) Oral at bedtime  sodium chloride 0.65% Nasal 1 Spray(s) Both Nostrils two times a day    MEDICATIONS  (PRN):  acetaminophen   Oral Liquid .. 650 milliGRAM(s) Oral every 6 hours PRN Temp greater or equal to 38C (100.4F), Mild Pain (1 - 3)  LORazepam   Injectable 2 milliGRAM(s) IV Push once PRN Seizure Activity (NOTIFY PROVIDER PRIOR TO GIVING)      CAPILLARY BLOOD GLUCOSE        I&O's Summary    21 Feb 2025 07:01  -  22 Feb 2025 07:00  --------------------------------------------------------  IN: 2094 mL / OUT: 850 mL / NET: 1244 mL        PHYSICAL EXAM:  Vital Signs Last 24 Hrs  T(C): 37.2 (22 Feb 2025 09:11), Max: 37.2 (22 Feb 2025 05:35)  T(F): 99 (22 Feb 2025 09:11), Max: 99 (22 Feb 2025 05:35)  HR: 90 (22 Feb 2025 09:00) (78 - 94)  BP: 111/73 (22 Feb 2025 09:00) (106/84 - 133/96)  BP(mean): 87 (22 Feb 2025 09:00) (84 - 110)  RR: 15 (22 Feb 2025 09:00) (14 - 18)  SpO2: 94% (22 Feb 2025 09:00) (93% - 100%)    Parameters below as of 22 Feb 2025 09:00  Patient On (Oxygen Delivery Method): ventilator    O2 Concentration (%): 40  Gen:  awake , in bed    HEENT: trach in place   CV: RRR, +S1/S2  Pulm: adequate respiratory effort, no increase in work of breathing  Abd: soft, ND  Skin: warm and dry,   Neuro: awake, does not talk, opening eyes to voice     LABS:                        10.1   6.28  )-----------( 205      ( 22 Feb 2025 07:05 )             31.2     02-22    136  |  99  |  52[H]  ----------------------------<  101[H]  4.4   |  25  |  1.42[H]    Ca    9.3      22 Feb 2025 07:05  Phos  4.6     02-22  Mg     2.2     02-22            Urinalysis Basic - ( 22 Feb 2025 07:05 )    Color: x / Appearance: x / SG: x / pH: x  Gluc: 101 mg/dL / Ketone: x  / Bili: x / Urobili: x   Blood: x / Protein: x / Nitrite: x   Leuk Esterase: x / RBC: x / WBC x   Sq Epi: x / Non Sq Epi: x / Bacteria: x        RADIOLOGY & ADDITIONAL TESTS:    Imaging Personally Reviewed:    Consultant(s) Notes Reviewed:      Care Discussed with Consultants/Other Providers:

## 2025-02-22 NOTE — PROGRESS NOTE ADULT - SUBJECTIVE AND OBJECTIVE BOX
HPI:  Unknown age male, unknown past medical history (reported stroke and MI by coworkers) presented to [facility] with AMS, Pt was working at [employer] when he was found down by coworkers. EMS called and pt brought to [facility] ED. Intubated, sedated, started on cardene for SBPs in 200s. CT head showed brain stem bleed. Transferred to NSICU for further management.  (30 Sep 2024 12:55)    HOSPITAL COURSE:   12/15: o/n Patient became tachycardic HR 120s and 10 minutes later O2 sat dropped as low as 89%. Patient suctioned without improvement in O2 sat and tube feeds found in suction catheter. TFs held.  STAT CXR ordered. STAT labs sent. Respiratory therapist called to bedside and patient trach connected to ventilator. After connection to ventilator and further suctioning O2 sat improved to 97% but patient HR remains 120-130s. Upgraded to NSICU for further management. Vancomycin and zosyn started. CTA  chest PE protocol and CTH ordered. Blood cultures sent.given 500cc bolus, rpt ABG sent pO2 243, CTH and CTA chest done. FS while NPO, FiO2 dec 50 pending ABG. sputum cx positive for few GPC and GNR.   12/16: GEORGE overnight, restarted amlodipine, troponin 75   12/17: GEORGE overnight, neuro stable. Attempted CPAP this morning, did not tolerate and back on full vent support. Dc'd Vanc. Tachycardia to 140s, noted extremities to be twitching along with jaw twitching. Given 2g of Keppra, total 4mg ativan and placed on EEG, full set of labs and lactate negative. Resumed trickle feeds at 20cc/hr. Given 250cc albumin for tachycardia.   12/18: GEORGE overnight. Neuro stable. CTH stable. EEG negative and dc'd.   12/19: GEORGE overnight. neuro stable. SIMV most of day, AC/VC at night.   12/20: GEORGE overnight, neuro stable. remains on VC/AC  12/21: GEORGE ovn. 1L bolus for tachy to 120s-130s.   12/22: GEORGE ovn. Tachy to 120s, given tylenol and IVF. 2mg IV ativan given for L jaw twitching. Sx resolved for 3 minutes and started again. Epilepsy contacted. Given 2mg IV ativan. Continued twitching. Increased keppra to 1500mg BID, 2mg ativan given. Propranolol started for refractory tachycardia. vEEG ordered. albumin given. POCUS performed, no b lines. Started on fosphenytoin, loaded w 20mg/kg then 100mg TID. febrile, pancx, started cefepime 2g q8, vancomycin  12/23: GEORGE ovn. +focal motor seizures on eeg. ID consulted. Vancomycin dc'ed as per ID.  12/24: GEORGE ovn, neuro stable. Baclofen 5 mg q12 started for hiccups. Na 129 from 134. Urine lytes c/w SIADH. Given 250cc 3% bolus. CT chest for infection w/u - f/u read. Repeat Na 136. UA negative  12/25: GEORGE ovn.   12/26: GEORGE ovn  12/27: GEORGE overnight. Blood cx neg @ 4 days, DC cefepime per medicine (sputum colonized).   12/28: Desaturation o/n to 80's, CXR obtained, pulse ox changed and sats resolved. Na 133 from 138, 250cc 3% bolus given. Cefepime resumed until 12/30 per ID. Rpt Na 138.  12/29: GEORGE overnight. Na stable.   12/30: GEORGE overnight. Family meeting with son, pt now DNR.   12/31: GEORGE overnight.   1/1: GEORGE overnight, neuro stable.  1/2: GEORGE overnight, neuro stable. FW decreased to 100q6.   1/3: GEORGE overnight, neuro stable. fosphenytoin IV changed to pheytoin PO via PEG per epilepsy recommendations  1/4: GEORGE overnight, neuro stable. FW incr. to 100q4  1/5: GEORGE overnight, neuro stable.   1/6: GEORGE overnight, neuro stable   1/7: GEORGE overnight, neuro stable. BUN/Cr increased, increased FW to 200q6. Given 1L bolus of LR over 2 hours. Vuong d/c'd, voiding, continue bladder scan q6.   1/8: GEORGE overnight, neuro stable. BUN/Cr improving.  1/9: GEORGE overnight, neuro stable.   1/10: GEORGE overnight, neuro stable.   1/11: GEORGE overnight, neuro stable, vent settings stable.   1/12: GEORGE overnight, neuro stable. Febrile to 102F, f/u pan cx, chest xray, given tylenol. Zosyn started.   1/13: GEORGE overnight, neuro stable, cont Zosyn for presumed PNA, prelim sputum cx growing; few GS neg diplococci, mod GS neg rods, rare GS + rods, fever trend improved. Free water increased to [id number].   1/14: GEORGE overnight. pending renal US for elevated Cr  1/15: GEORGE ovn. renal US complete.   1/16: GEORGE overnight, neuro stable   1/17: GEORGE overnight, neuro stable   1/18: GEORGE overnight, neuro stable.   1/19: GEORGE overnight, neuro stable. Propranolol dec to 5q8.   1/20: GEORGE overnight, neuro stable. Weaned propranolol to 5mg BID.   1/21: GEORGE ovn, in AM having rapid vertical eye movements with facial twitching, 2g total keppra given for AM dose and phenytoin level ordered, vital signs stable. CTH stable. Phenytoin increased to 100/100/150mg per epilepsy  1/22: GEORGE ovn. IV hydral x 1 for SBP 170s.   1/23: Cont to have focal motor seizures. Per epilepsy, changed phenytoin dose to 100/100/200.   1/24: Phenytoin lvl tmrw AM. Chest Xray completed due to temp 100.2F  1/25: GEORGE overnight. Incr dilantin morning and afternoon per epilepsy.   1/26: GEORGE overnight. Sustained focal twitching noticed by nursing. Ativan 8mg in total given. Fosphenytoin 1500mg IV given. Placed on EEG. Epilepsy following. TFs held. Phenytoin increased to 150/150/200.   1/27: o/n CTH completed due to continued lethargy after ativan given yesterday afternoon. TFs resumed. 500cc bolus NS given for SBP 90s. EEG dc'ed, discussed w/ Dr. Tomlin. Febrile 101F, pan cx sent. Likely left sided infiltrate on CXR, c/f aspiration PNA. Started on vanc/zosyn. MRSA swab pending.   1/28: Neuro stable, on vanc/zosyn. +UTI on UA, MRSA negative. Vanc dc'd. RVP negative. Zosyn increased to pseudomonal dosing.   1/29: GEORGE overnight, neuro stable.  1/30: GEORGE overnight, neuro stable. Dc'd zosyn due to resistance, switched to Levaquin.  1/31: GEORGE ovenight, neuro stable.   2/1: Brief desat. Improved with neb and reposititioning. ABG and POCUS wnl.   2/4: GEORGE overnight  2/5: GEORGE overnight  2/6: GEORGE ovn  2/7: GEORGE ovn. L eye redness noted, started erythromycin and lacrilube to L eye as well. LR @ 100 cc/hr x 10 hours for uptrending BUN/Cr. Resumed bowel reg.   2/8: GEORGE overnight. Escalated bowel regimen.   2/9: GEORGE overnight.  2/10: GEORGE overnight. Social work to start working on SNF placement. Pending appointment with [employer] of [place] security to come to hospital for biometrics.  2/11: GEORGE overnight. Neuro stable. Potassium improved (downtrending).   2/12: GEORGE overnight. Neuro stable. Lokelma 5mg x 1. Decrease FW to 200q8. 1L NS bolus given for hydration, uptrending BUN/Cr. Wound care rec barrier wipes for penile wound.  TFs changed to Cyvera renal formula per nutrition.  2/13: GEORGE overnight. Neuro stable. Corneal abrasion noted on exam, ophtho re-consulted for L eye, eye drops changed per recs to q4. Pulm contacted for trach exchange, attempted at bedside but patient unable to tolerate with minimal sedation, will plan for tomorrow.   2/14: GEORGE overnight, given 1L of NS for low BP after fentanyl with improvement. Neuro exam stable, pend trach exchange today with pulm. Pend optho assessment for left eye. Trache exchanged. Desaturation during placement to 90%. FiO2 incr'd 55%. CXR negative for pneumothorax.   2/15: GEORGE overnight. Dest'd 88%: suctione, incr'd FiO2, PEEP. CXR/ABG/POCUS done with B-lines, 20mg IV lasix given.   2/16: GEORGE ovn, BAL from 2/13 during trach exchange growing moderate stenotrophomonas maltophilia, desat to 87% red rubber suctioning performed with improvements in O2 sat to 98%, Desturated again to 76%. Deep suctioned, ABG/CXR and lasix, incr. vent settings, Saturating 95%. Pulm rec'd dc mucomyst. Increased clonus while having another episode of desaturation, given 4mg ativan, clonus broke. O2 sats normalized with deep suction. Back on vEEG per gen neuro. CT chest and sputum cx obtained per ID.   2/17: GEORGE ovn. Blood cultures drawn. ID rec'd treatment for stenotrophomonas. EEG with concern for singular seizure activity. Keppra and Phenytoin levels were drawn. 2x blood cultures sent prior to starting DS Bactrim and Minocycline PO as per ID recs. Additional standing ativan added per epilepsy.   2/18: GEORGE ovn. Per epilepsy ativan decreased to 1mg q12. EEG dc'd.  2/19: GEORGE ovn. 500cc bolus for Cr bump over 5 hrs. Dc'd bactrim and minocycline. Start ertapanem as per ID.   2/20: GEORGE overnight, neuro stable. Cr bump likely 2/2 Bactrim, will monitor. TF adjusted off minocycline. DC propranolol and baclofen. Sent photo of L eye to ophtho, increased drops to q4.  2/21: GEORGE overnight, neuro stable. Keep Ertapenem per Dr. Velasco. FENa consistent with pre-renal ANIKA, 1L LR administered 125cc/hr.  2/22: GEORGE overnight    OVERNIGHT EVENTS:  Vital Signs Last 24 Hrs  T(C): 37.1 (21 Feb 2025 21:49), Max: 37.5 (21 Feb 2025 04:50)  T(F): 98.8 (21 Feb 2025 21:49), Max: 99.5 (21 Feb 2025 04:50)  HR: 90 (22 Feb 2025 03:45) (78 - 102)  BP: 110/68 (22 Feb 2025 03:45) (106/84 - 133/96)  BP(mean): 84 (22 Feb 2025 03:45) (84 - 110)  RR: 16 (22 Feb 2025 03:45) (14 - 18)  SpO2: 98% (22 Feb 2025 03:45) (93% - 99%)    Parameters below as of 22 Feb 2025 03:45  Patient On (Oxygen Delivery Method): ventilator    O2 Concentration (%): 40    I&O's Summary    20 Feb 2025 07:01  -  21 Feb 2025 07:00  --------------------------------------------------------  IN: 1838 mL / OUT: 1450 mL / NET: 388 mL    21 Feb 2025 07:01  -  22 Feb 2025 04:38  --------------------------------------------------------  IN: 2094 mL / OUT: 850 mL / NET: 1244 mL        PHYSICAL EXAM:  Constitutional: NAD, lying in bed.  HEENT: Pupils equal, round, reactive to light.   Respiratory: +Trach to mechanical vent. No respiratory distress, symmetric chest rise  Cardiovascular: Regular rate and rhythm    Gastrointestinal: +PEG, Abdomen soft, nondistended.  Neurological:  AAOX0-1. Opens eyes. Tracks vertically  Motor: RUE squeezes hand to comman, LUE 0/5, B/l LE w/d.   Sensation: unable to assess  Extremities: Warm, well perfused.    TUBES/LINES:  [] CVC  [] A-line  [] Lumbar Drain  [] Ventriculostomy  [] Other    DIET:  [] NPO  [] Mechanical  [X] Tube feeds            LABS:                        10.8   6.69  )-----------( 228      ( 21 Feb 2025 06:50 )             32.6     02-21    136  |  100  |  52[H]  ----------------------------<  122[H]  4.4   |  23  |  1.49[H]    Ca    9.6      21 Feb 2025 06:50  Phos  5.2     02-21  Mg     2.3     02-21    TPro  7.2  /  Alb  3.4  /  TBili  0.2  /  DBili  <0.1  /  AST  12  /  ALT  16  /  AlkPhos  188[H]  02-20      Urinalysis Basic - ( 21 Feb 2025 06:50 )    Color: x / Appearance: x / SG: x / pH: x  Gluc: 122 mg/dL / Ketone: x  / Bili: x / Urobili: x   Blood: x / Protein: x / Nitrite: x   Leuk Esterase: x / RBC: x / WBC x   Sq Epi: x / Non Sq Epi: x / Bacteria: x          CAPILLARY BLOOD GLUCOSE          Drug Levels: [] N/A  Phenytoin Level, Serum: 18.5 ug/mL (02-17 @ 12:57)    CSF Analysis: [] N/A      Allergies    Allergy Status Unknown    Intolerances      MEDICATIONS:  Antibiotics:  ertapenem  IVPB 1000 milliGRAM(s) IV Intermittent every 24 hours    Neuro:  acetaminophen   Oral Liquid .. 650 milliGRAM(s) Oral every 6 hours PRN  levETIRAcetam 1500 milliGRAM(s) Oral every 12 hours  LORazepam     Tablet 1 milliGRAM(s) Oral every 12 hours  LORazepam   Injectable 2 milliGRAM(s) IV Push once PRN  phenytoin   Suspension 150 milliGRAM(s) Oral <User Schedule>  phenytoin   Suspension 200 milliGRAM(s) Oral <User Schedule>    Anticoagulation:  heparin   Injectable 5000 Unit(s) SubCutaneous every 8 hours    OTHER:  albuterol/ipratropium for Nebulization 3 milliLiter(s) Nebulizer every 4 hours  amLODIPine   Tablet 5 milliGRAM(s) Oral daily  artificial tears (preservative free) Ophthalmic Solution 1 Drop(s) Both EYES every 4 hours  chlorhexidine 0.12% Liquid 15 milliLiter(s) Oral Mucosa every 12 hours  doxazosin 8 milliGRAM(s) Oral at bedtime  erythromycin   Ointment 1 Application(s) Both EYES every 4 hours  fluticasone propionate 50 MICROgram(s)/spray Nasal Spray 1 Spray(s) Both Nostrils two times a day  influenza   Vaccine 0.5 milliLiter(s) IntraMuscular once  pantoprazole   Suspension 40 milliGRAM(s) Oral daily  petrolatum Ophthalmic Ointment 1 Application(s) Both EYES every 4 hours  polyethylene glycol 3350 17 Gram(s) Oral two times a day  senna 2 Tablet(s) Oral at bedtime  sodium chloride 0.65% Nasal 1 Spray(s) Both Nostrils two times a day    IVF:    CULTURES:  Culture Results:   No growth at 4 days (02-17 @ 12:10)  Culture Results:   No growth at 4 days (02-17 @ 12:10)    RADIOLOGY & ADDITIONAL TESTS:      ASSESSMENT:  44M PMH ?stroke/MI present to [facility] after collapsing at work. Decorticate posturing, vomiting, intubated for airway protection. 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 Transferred to [facility] for further management. s/p trach 10/14. s/p peg 10/21. Re-upgrade to ICU 2/2 desaturation event and suctioning requirements 11/15. Re-upgrade to NSICU 12/15 2/2 desaturation and tachycardia.        PLAN:  Neuro:  - neuro/vitals q4h  - pain control: tylenol prn  - seizure tx: keppra 1500mg BID, phenytoin 150/150/200, ativan 1mg q12  - s/p vEEG (10/3-4), (10/17-10/19), (12/17-12/18), (12/22-12/25), (1/26-1/28), (2/16-2/18) +seizure on eeg 2/17  - CTH 9/30: enlarged pontine hemorrhage, CTH 10/3: stable, CTH 10/25: mostly resolved pontine hemorrhage, CTH 12/15: R mastoid air cell opacification; acute otitis media vs sterile effusion, CTH 12/18: stable. CTH 1/21 stable, CTH 1/27 stable  - MRI brain 10/12: parenchymal hemorrhage, acute/subacute R cerebellar stroke      CV:  - -160  - tachycardia resolved, s/p propranolol  - HTN: amlodipine 5mg  - echo (9/30) EF 75%, repeat 12/16: 57%    PULM:  - s/p trach exchange with pulm at bedside 2/14 to vent, AC/VC 40/400/14/6  - Secretions: duonebs/chest PT q4h  - CT chest 2/17: Near complete collapse b/l lower lobes. Patchy opacity within LLL/ALONDRA  - pulmonology consulted, recs appreciated    GI:  - TFs via PEG, candelaria roseanna renal  - bowel regimen, last BM 2/21    Renal:  - IVL  - FW 200q8 for hydration  - Voiding, SC prn  - CKD: trend BUN/Cr  - renal US 10/1, 10/8, 1/15: Increased bilateral renal echogenicity consistent with medical renal disease    Endo:  - A1c 5.4    Heme:  - DVT ppx: SCDs, SQH 5000u q8h   - LE dopplers negative 12/16    ID:  - sputum 2/16 +Enterobacter cloacae: Ertapenem (2/19- ) per ID recs, likely conolonized with stenotrophomonas  - +klebsiella UTI s/p levaquin (1/30-2/3)  - empiric PNA: cefepime (12/22-12/30), s/p vanc (12/22-12/23), s/p zosyn (12/15-12/20), s/p vanc (12/15-12/16), s/p zosyn (1/12 -1/18), s/p DS bactrim 2 tabs TID, 200 mg minocycline BID (2/18)  - +klebsiella UTI s/p   - s/p Stenotrophomonas maltophilia PNA: s/p Bactrim (11/18-11/19) s/p Cefepime (11/16-11/18)  - 11/3, (+) sputum for stenotrophomas maltophlia, blood cx (+) klebsiella, cefepime 2gq12 (11/6 - 11/12)   - S/p Ancef (10/4-10/14) for PNA, and s/p Unasyn (10/18-10/23) +actinobacter baumanii     MISC:  - BL keratitis: BL erythromycin ointment, artificial tears, lacrilube q4, moisture chamber   - Penile wound: wound care rec barrier wipes     Dispo: SDU status, DNR, pending SNF    D/w Dr. D'Amico

## 2025-02-23 LAB
ANION GAP SERPL CALC-SCNC: 13 MMOL/L — SIGNIFICANT CHANGE UP (ref 5–17)
BUN SERPL-MCNC: 47 MG/DL — HIGH (ref 7–23)
CALCIUM SERPL-MCNC: 9.2 MG/DL — SIGNIFICANT CHANGE UP (ref 8.4–10.5)
CHLORIDE SERPL-SCNC: 97 MMOL/L — SIGNIFICANT CHANGE UP (ref 96–108)
CO2 SERPL-SCNC: 24 MMOL/L — SIGNIFICANT CHANGE UP (ref 22–31)
CREAT SERPL-MCNC: 1.27 MG/DL — SIGNIFICANT CHANGE UP (ref 0.5–1.3)
EGFR: 71 ML/MIN/1.73M2 — SIGNIFICANT CHANGE UP
EGFR: 71 ML/MIN/1.73M2 — SIGNIFICANT CHANGE UP
GLUCOSE SERPL-MCNC: 101 MG/DL — HIGH (ref 70–99)
HCT VFR BLD CALC: 30.8 % — LOW (ref 39–50)
HGB BLD-MCNC: 10 G/DL — LOW (ref 13–17)
MAGNESIUM SERPL-MCNC: 2.2 MG/DL — SIGNIFICANT CHANGE UP (ref 1.6–2.6)
MCHC RBC-ENTMCNC: 31.1 PG — SIGNIFICANT CHANGE UP (ref 27–34)
MCHC RBC-ENTMCNC: 32.5 G/DL — SIGNIFICANT CHANGE UP (ref 32–36)
MCV RBC AUTO: 95.7 FL — SIGNIFICANT CHANGE UP (ref 80–100)
NRBC BLD AUTO-RTO: 0 /100 WBCS — SIGNIFICANT CHANGE UP (ref 0–0)
PHOSPHATE SERPL-MCNC: 4.5 MG/DL — SIGNIFICANT CHANGE UP (ref 2.5–4.5)
PLATELET # BLD AUTO: 203 K/UL — SIGNIFICANT CHANGE UP (ref 150–400)
POTASSIUM SERPL-MCNC: 4.3 MMOL/L — SIGNIFICANT CHANGE UP (ref 3.5–5.3)
POTASSIUM SERPL-SCNC: 4.3 MMOL/L — SIGNIFICANT CHANGE UP (ref 3.5–5.3)
RBC # BLD: 3.22 M/UL — LOW (ref 4.2–5.8)
RBC # FLD: 12.1 % — SIGNIFICANT CHANGE UP (ref 10.3–14.5)
SODIUM SERPL-SCNC: 134 MMOL/L — LOW (ref 135–145)
WBC # BLD: 6.48 K/UL — SIGNIFICANT CHANGE UP (ref 3.8–10.5)
WBC # FLD AUTO: 6.48 K/UL — SIGNIFICANT CHANGE UP (ref 3.8–10.5)

## 2025-02-23 PROCEDURE — 99231 SBSQ HOSP IP/OBS SF/LOW 25: CPT

## 2025-02-23 PROCEDURE — 99233 SBSQ HOSP IP/OBS HIGH 50: CPT

## 2025-02-23 RX ADMIN — LEVETIRACETAM 1500 MILLIGRAM(S): 10 INJECTION, SOLUTION INTRAVENOUS at 05:44

## 2025-02-23 RX ADMIN — Medication 200 MILLIGRAM(S): at 19:35

## 2025-02-23 RX ADMIN — HEPARIN SODIUM 5000 UNIT(S): 1000 INJECTION INTRAVENOUS; SUBCUTANEOUS at 22:55

## 2025-02-23 RX ADMIN — ERYTHROMYCIN 1 APPLICATION(S): 5 OINTMENT OPHTHALMIC at 09:38

## 2025-02-23 RX ADMIN — ERYTHROMYCIN 1 APPLICATION(S): 5 OINTMENT OPHTHALMIC at 13:37

## 2025-02-23 RX ADMIN — HEPARIN SODIUM 5000 UNIT(S): 1000 INJECTION INTRAVENOUS; SUBCUTANEOUS at 05:44

## 2025-02-23 RX ADMIN — DOXAZOSIN MESYLATE 8 MILLIGRAM(S): 8 TABLET ORAL at 22:51

## 2025-02-23 RX ADMIN — ERYTHROMYCIN 1 APPLICATION(S): 5 OINTMENT OPHTHALMIC at 05:45

## 2025-02-23 RX ADMIN — IPRATROPIUM BROMIDE AND ALBUTEROL SULFATE 3 MILLILITER(S): .5; 2.5 SOLUTION RESPIRATORY (INHALATION) at 17:46

## 2025-02-23 RX ADMIN — Medication 1 DROP(S): at 22:50

## 2025-02-23 RX ADMIN — Medication 1 SPRAY(S): at 17:47

## 2025-02-23 RX ADMIN — Medication 15 MILLILITER(S): at 17:47

## 2025-02-23 RX ADMIN — HEPARIN SODIUM 5000 UNIT(S): 1000 INJECTION INTRAVENOUS; SUBCUTANEOUS at 13:36

## 2025-02-23 RX ADMIN — Medication 1 MILLIGRAM(S): at 00:32

## 2025-02-23 RX ADMIN — IPRATROPIUM BROMIDE AND ALBUTEROL SULFATE 3 MILLILITER(S): .5; 2.5 SOLUTION RESPIRATORY (INHALATION) at 09:37

## 2025-02-23 RX ADMIN — LEVETIRACETAM 1500 MILLIGRAM(S): 10 INJECTION, SOLUTION INTRAVENOUS at 17:45

## 2025-02-23 RX ADMIN — FLUTICASONE PROPIONATE 1 SPRAY(S): 50 SPRAY, METERED NASAL at 17:47

## 2025-02-23 RX ADMIN — ERYTHROMYCIN 1 APPLICATION(S): 5 OINTMENT OPHTHALMIC at 01:31

## 2025-02-23 RX ADMIN — IPRATROPIUM BROMIDE AND ALBUTEROL SULFATE 3 MILLILITER(S): .5; 2.5 SOLUTION RESPIRATORY (INHALATION) at 05:43

## 2025-02-23 RX ADMIN — Medication 150 MILLIGRAM(S): at 03:00

## 2025-02-23 RX ADMIN — IPRATROPIUM BROMIDE AND ALBUTEROL SULFATE 3 MILLILITER(S): .5; 2.5 SOLUTION RESPIRATORY (INHALATION) at 13:36

## 2025-02-23 RX ADMIN — IPRATROPIUM BROMIDE AND ALBUTEROL SULFATE 3 MILLILITER(S): .5; 2.5 SOLUTION RESPIRATORY (INHALATION) at 01:30

## 2025-02-23 RX ADMIN — Medication 150 MILLIGRAM(S): at 13:36

## 2025-02-23 RX ADMIN — Medication 1 APPLICATION(S): at 05:45

## 2025-02-23 RX ADMIN — ERYTHROMYCIN 1 APPLICATION(S): 5 OINTMENT OPHTHALMIC at 22:50

## 2025-02-23 RX ADMIN — Medication 1 APPLICATION(S): at 17:46

## 2025-02-23 RX ADMIN — ERYTHROMYCIN 1 APPLICATION(S): 5 OINTMENT OPHTHALMIC at 17:46

## 2025-02-23 RX ADMIN — FLUTICASONE PROPIONATE 1 SPRAY(S): 50 SPRAY, METERED NASAL at 05:45

## 2025-02-23 RX ADMIN — Medication 1 DROP(S): at 17:49

## 2025-02-23 RX ADMIN — ERTAPENEM SODIUM 120 MILLIGRAM(S): 1 INJECTION, POWDER, LYOPHILIZED, FOR SOLUTION INTRAMUSCULAR; INTRAVENOUS at 13:37

## 2025-02-23 RX ADMIN — Medication 1 DROP(S): at 09:38

## 2025-02-23 RX ADMIN — Medication 1 SPRAY(S): at 05:44

## 2025-02-23 RX ADMIN — Medication 15 MILLILITER(S): at 05:44

## 2025-02-23 RX ADMIN — Medication 1 APPLICATION(S): at 01:31

## 2025-02-23 RX ADMIN — Medication 1 DROP(S): at 01:31

## 2025-02-23 RX ADMIN — Medication 1 DROP(S): at 13:36

## 2025-02-23 RX ADMIN — Medication 1 APPLICATION(S): at 13:37

## 2025-02-23 RX ADMIN — Medication 40 MILLIGRAM(S): at 13:35

## 2025-02-23 RX ADMIN — IPRATROPIUM BROMIDE AND ALBUTEROL SULFATE 3 MILLILITER(S): .5; 2.5 SOLUTION RESPIRATORY (INHALATION) at 22:52

## 2025-02-23 RX ADMIN — Medication 1 DROP(S): at 05:45

## 2025-02-23 RX ADMIN — Medication 1 APPLICATION(S): at 09:39

## 2025-02-23 RX ADMIN — Medication 1 APPLICATION(S): at 22:06

## 2025-02-23 RX ADMIN — Medication 1 MILLIGRAM(S): at 13:36

## 2025-02-23 NOTE — PROGRESS NOTE ADULT - ASSESSMENT
44M with unclear PMH (?stroke, MI) who was found down at work, intubated for airway protection and found to have acute parenchymal hemorrhage within nabil with mass effect (+ acute/subacute right cerebellar infarct) in setting of hypertensive emergency, transferred to [facility] for further neurosurgical care. Hospital course c/b poor neurologic recovery s/p trach-PEG, AUR s/p gabriel, ANIKA on CKD c/b hyperkalemia, HAP s/p amp-sulbactam (EOT 10/23), K aerogenes bacteremia  treated with 2 weeks of Cefepime per ID , On 11/15 he became septic and was transferred to NSICU due to increased o2 requirements and needed Vent support , Trach Cultures + for Stenotrophomonas which was treated with 7 days of Bactrim per ID , has been weaned of Vent since 11/23 and is now on 10lts at 40% o2 through Trach Collar. Stepped up to the NSICU on 12/15 for hypoxia and tachycardia. Pt s/p  abx for 5 days. Pt now stepped down to 8Lach.    # Pontine hemorrhage  # Encephalopathy due to Intracranial Bleed   - Acute parenchymal hemorrhage within nabil with mass effect (+ acute/subacute right cerebellar infarct) in setting of hypertensive emergency.   - MRI brain also demonstrated acute/subacute R cerebellar stroke.   - No Neurosurgical Interventions were performed   - Trach and PEG Dependent    # Seizures  - Pt with episode of facial and leg twitching on 2/16 which resolved after the pt was given ativan 4mg x1. Off vEEG since 2/18   - Continue Seizure precautions   - Continue Keppra and phenytoin    # Hypertension  - Continue amlodipine 5mg daily with holding parameters    # Acute on Chronic respiratory failure   - s/p percutaneous trach by pulm on 10/14/24 - Trach Exchanged by Pulmonary on 2/14/25   - Pt's BAL on 2/13 growing Stenotrophomonas and Enterobacter Cloacae complex  - Continue vent support and chest PT  - F/u ID recommendations - Bactrim and Minocycline 2/17  switched to Ertapenem on 2/19 - End date 2/23 per ID   - Given numerous infectious foci, prolonged hospitalization, recurrent infections and need for broad spectrum antibiotics coverage, without the potential of further clinical recovery, would discuss consideration of no escalation of antibiotics past this current regimen.    # Neurogenic dysphagia.   - Pt s/p PEG placement by  surgery 10/21/24  - Continue TF per nutrition  - Continue aspiration precautions and elevation of HOB.    # Acute urinary retention.   - doxazosin 8mg qHS   - Chronic Gabriel     # Functional quadriplegia in setting of brainstem hemorrhage  - decub precautions  - care per nursing protocol     # ANIKA on CKD stage III   - Baseline Creatinine 1.1  -  ? Volume depletion + Bactrim induced   - Check Urine Lytes, if Pre renal can Give LR 125ml for 8 hours   - Continue to monitor creatinine and avoid nephrotoxic agents    # Anemia of Chronic Disease     # DVT prop  -Heparin SQ TID    # Dispo : Awaiting Transfer to [facility]

## 2025-02-23 NOTE — PROGRESS NOTE ADULT - SUBJECTIVE AND OBJECTIVE BOX
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 pulm at bedside 2/14 to vent, AC/VC 40/400/14/6  - Secretions: duonebs/chest PT q4h  - CT chest 2/17: Near complete collapse b/l lower lobes. Patchy opacity within LLL/ALONDRA  - pulmonology consulted, recs appreciated    GI:  - TFs via PEG, candelaria roseanna renal  - bowel regimen held due to loose stool, last BM 2/22    Renal:  - IVL  - FW 200q8 for hydration  - Voiding, SC prn  - CKD: trend BUN/Cr  - renal US 10/1, 10/8, 1/15: Increased bilateral renal echogenicity consistent with medical renal disease    Endo:  - A1c 5.4    Heme:  - DVT ppx: SCDs, SQH 5000u q8h   - LE dopplers negative 12/16    ID:  - sputum 2/16 +Enterobacter cloacae: Ertapenem (2/19- ) per ID recs, likely conolonized with stenotrophomonas  - +klebsiella UTI s/p levaquin (1/30-2/3)  - empiric PNA: cefepime (12/22-12/30), s/p vanc (12/22-12/23), s/p zosyn (12/15-12/20), s/p vanc (12/15-12/16), s/p zosyn (1/12 -1/18), s/p DS bactrim 2 tabs TID, 200 mg minocycline BID (2/18)  - +klebsiella UTI s/p   - s/p Stenotrophomonas maltophilia PNA: s/p Bactrim (11/18-11/19) s/p Cefepime (11/16-11/18)  - 11/3, (+) sputum for stenotrophomas maltophlia, blood cx (+) klebsiella, cefepime 2gq12 (11/6 - 11/12)   - S/p Ancef (10/4-10/14) for PNA, and s/p Unasyn (10/18-10/23) +actinobacter baumanii     MISC:  - BL keratitis: BL erythromycin ointment, artificial tears, lacrilube q4, moisture chamber   - Penile wound: wound care rec barrier wipes     Dispo: SDU status, DNR, pending SNF    D/w Dr. D'Amico   [] Functional quadriplegia  [] Acute blood loss anemia

## 2025-02-23 NOTE — PROGRESS NOTE ADULT - SUBJECTIVE AND OBJECTIVE BOX
Patient is a 44y old  Male who presents with a chief complaint of brain stem bleed (23 Feb 2025 00:44)      SUBJECTIVE / OVERNIGHT EVENTS:  No acute events overnight.  Afebrile.  No leukocytosis.   ANIKA resolved.    MEDICATIONS  (STANDING):  albuterol/ipratropium for Nebulization 3 milliLiter(s) Nebulizer every 4 hours  amLODIPine   Tablet 5 milliGRAM(s) Oral daily  artificial tears (preservative free) Ophthalmic Solution 1 Drop(s) Both EYES every 4 hours  chlorhexidine 0.12% Liquid 15 milliLiter(s) Oral Mucosa every 12 hours  doxazosin 8 milliGRAM(s) Oral at bedtime  ertapenem  IVPB 1000 milliGRAM(s) IV Intermittent every 24 hours  erythromycin   Ointment 1 Application(s) Both EYES every 4 hours  fluticasone propionate 50 MICROgram(s)/spray Nasal Spray 1 Spray(s) Both Nostrils two times a day  heparin   Injectable 5000 Unit(s) SubCutaneous every 8 hours  influenza   Vaccine 0.5 milliLiter(s) IntraMuscular once  levETIRAcetam 1500 milliGRAM(s) Oral every 12 hours  LORazepam     Tablet 1 milliGRAM(s) Oral every 12 hours  pantoprazole   Suspension 40 milliGRAM(s) Oral daily  petrolatum Ophthalmic Ointment 1 Application(s) Both EYES every 4 hours  phenytoin   Suspension 150 milliGRAM(s) Oral <User Schedule>  phenytoin   Suspension 200 milliGRAM(s) Oral <User Schedule>  sodium chloride 0.65% Nasal 1 Spray(s) Both Nostrils two times a day    MEDICATIONS  (PRN):  acetaminophen   Oral Liquid .. 650 milliGRAM(s) Oral every 6 hours PRN Temp greater or equal to 38C (100.4F), Mild Pain (1 - 3)  LORazepam   Injectable 2 milliGRAM(s) IV Push once PRN Seizure Activity (NOTIFY PROVIDER PRIOR TO GIVING)      CAPILLARY BLOOD GLUCOSE        I&O's Summary    22 Feb 2025 07:01  -  23 Feb 2025 07:00  --------------------------------------------------------  IN: 1821 mL / OUT: 570 mL / NET: 1251 mL        PHYSICAL EXAM:  Vital Signs Last 24 Hrs  T(C): 37.1 (23 Feb 2025 08:58), Max: 37.1 (23 Feb 2025 08:58)  T(F): 98.8 (23 Feb 2025 08:58), Max: 98.8 (23 Feb 2025 08:58)  HR: 92 (23 Feb 2025 08:30) (80 - 108)  BP: 113/83 (23 Feb 2025 08:30) (110/78 - 138/92)  BP(mean): 94 (23 Feb 2025 08:30) (89 - 107)  RR: 14 (23 Feb 2025 08:30) (14 - 19)  SpO2: 99% (23 Feb 2025 08:30) (95% - 100%)    Parameters below as of 23 Feb 2025 08:30  Patient On (Oxygen Delivery Method): ventilator    O2 Concentration (%): 50  Gen:  awake , in bed    HEENT: trach in place   CV: RRR, +S1/S2  Pulm: adequate respiratory effort, no increase in work of breathing  Abd: soft, ND  Skin: warm and dry,   Neuro: awake, does not talk, opening eyes to voice     LABS:                        10.0   6.48  )-----------( 203      ( 23 Feb 2025 07:30 )             30.8     02-23    134[L]  |  97  |  47[H]  ----------------------------<  101[H]  4.3   |  24  |  1.27    Ca    9.2      23 Feb 2025 07:30  Phos  4.5     02-23  Mg     2.2     02-23            Urinalysis Basic - ( 23 Feb 2025 07:30 )    Color: x / Appearance: x / SG: x / pH: x  Gluc: 101 mg/dL / Ketone: x  / Bili: x / Urobili: x   Blood: x / Protein: x / Nitrite: x   Leuk Esterase: x / RBC: x / WBC x   Sq Epi: x / Non Sq Epi: x / Bacteria: x        RADIOLOGY & ADDITIONAL TESTS:    Imaging Personally Reviewed:    Consultant(s) Notes Reviewed:      Care Discussed with Consultants/Other Providers:

## 2025-02-24 LAB
ANION GAP SERPL CALC-SCNC: 11 MMOL/L — SIGNIFICANT CHANGE UP (ref 5–17)
BUN SERPL-MCNC: 49 MG/DL — HIGH (ref 7–23)
CALCIUM SERPL-MCNC: 9.4 MG/DL — SIGNIFICANT CHANGE UP (ref 8.4–10.5)
CHLORIDE SERPL-SCNC: 102 MMOL/L — SIGNIFICANT CHANGE UP (ref 96–108)
CO2 SERPL-SCNC: 25 MMOL/L — SIGNIFICANT CHANGE UP (ref 22–31)
CREAT SERPL-MCNC: 1.2 MG/DL — SIGNIFICANT CHANGE UP (ref 0.5–1.3)
EGFR: 76 ML/MIN/1.73M2 — SIGNIFICANT CHANGE UP
EGFR: 76 ML/MIN/1.73M2 — SIGNIFICANT CHANGE UP
GLUCOSE SERPL-MCNC: 120 MG/DL — HIGH (ref 70–99)
HCT VFR BLD CALC: 31.5 % — LOW (ref 39–50)
HGB BLD-MCNC: 10.2 G/DL — LOW (ref 13–17)
MCHC RBC-ENTMCNC: 31.8 PG — SIGNIFICANT CHANGE UP (ref 27–34)
MCHC RBC-ENTMCNC: 32.4 G/DL — SIGNIFICANT CHANGE UP (ref 32–36)
MCV RBC AUTO: 98.1 FL — SIGNIFICANT CHANGE UP (ref 80–100)
NRBC BLD AUTO-RTO: 0 /100 WBCS — SIGNIFICANT CHANGE UP (ref 0–0)
PHOSPHATE SERPL-MCNC: 4.8 MG/DL — HIGH (ref 2.5–4.5)
PLATELET # BLD AUTO: 204 K/UL — SIGNIFICANT CHANGE UP (ref 150–400)
POTASSIUM SERPL-MCNC: 4.2 MMOL/L — SIGNIFICANT CHANGE UP (ref 3.5–5.3)
POTASSIUM SERPL-SCNC: 4.2 MMOL/L — SIGNIFICANT CHANGE UP (ref 3.5–5.3)
RBC # BLD: 3.21 M/UL — LOW (ref 4.2–5.8)
RBC # FLD: 12.1 % — SIGNIFICANT CHANGE UP (ref 10.3–14.5)
SODIUM SERPL-SCNC: 138 MMOL/L — SIGNIFICANT CHANGE UP (ref 135–145)
WBC # BLD: 5.82 K/UL — SIGNIFICANT CHANGE UP (ref 3.8–10.5)
WBC # FLD AUTO: 5.82 K/UL — SIGNIFICANT CHANGE UP (ref 3.8–10.5)

## 2025-02-24 PROCEDURE — 99231 SBSQ HOSP IP/OBS SF/LOW 25: CPT

## 2025-02-24 PROCEDURE — 99232 SBSQ HOSP IP/OBS MODERATE 35: CPT

## 2025-02-24 RX ADMIN — Medication 1 MILLIGRAM(S): at 12:08

## 2025-02-24 RX ADMIN — Medication 1 DROP(S): at 10:46

## 2025-02-24 RX ADMIN — ERYTHROMYCIN 1 APPLICATION(S): 5 OINTMENT OPHTHALMIC at 02:19

## 2025-02-24 RX ADMIN — Medication 1 SPRAY(S): at 06:04

## 2025-02-24 RX ADMIN — AMLODIPINE BESYLATE 5 MILLIGRAM(S): 10 TABLET ORAL at 06:03

## 2025-02-24 RX ADMIN — IPRATROPIUM BROMIDE AND ALBUTEROL SULFATE 3 MILLILITER(S): .5; 2.5 SOLUTION RESPIRATORY (INHALATION) at 06:03

## 2025-02-24 RX ADMIN — Medication 1 APPLICATION(S): at 02:19

## 2025-02-24 RX ADMIN — Medication 1 DROP(S): at 02:19

## 2025-02-24 RX ADMIN — Medication 1 APPLICATION(S): at 06:28

## 2025-02-24 RX ADMIN — ERYTHROMYCIN 1 APPLICATION(S): 5 OINTMENT OPHTHALMIC at 10:46

## 2025-02-24 RX ADMIN — LEVETIRACETAM 1500 MILLIGRAM(S): 10 INJECTION, SOLUTION INTRAVENOUS at 06:07

## 2025-02-24 RX ADMIN — Medication 1 DROP(S): at 13:19

## 2025-02-24 RX ADMIN — LEVETIRACETAM 1500 MILLIGRAM(S): 10 INJECTION, SOLUTION INTRAVENOUS at 17:25

## 2025-02-24 RX ADMIN — Medication 150 MILLIGRAM(S): at 07:28

## 2025-02-24 RX ADMIN — IPRATROPIUM BROMIDE AND ALBUTEROL SULFATE 3 MILLILITER(S): .5; 2.5 SOLUTION RESPIRATORY (INHALATION) at 21:52

## 2025-02-24 RX ADMIN — Medication 1 SPRAY(S): at 17:27

## 2025-02-24 RX ADMIN — Medication 1 DROP(S): at 06:04

## 2025-02-24 RX ADMIN — Medication 15 MILLILITER(S): at 06:03

## 2025-02-24 RX ADMIN — Medication 1 APPLICATION(S): at 10:46

## 2025-02-24 RX ADMIN — Medication 40 MILLIGRAM(S): at 12:08

## 2025-02-24 RX ADMIN — DOXAZOSIN MESYLATE 8 MILLIGRAM(S): 8 TABLET ORAL at 21:53

## 2025-02-24 RX ADMIN — IPRATROPIUM BROMIDE AND ALBUTEROL SULFATE 3 MILLILITER(S): .5; 2.5 SOLUTION RESPIRATORY (INHALATION) at 17:25

## 2025-02-24 RX ADMIN — HEPARIN SODIUM 5000 UNIT(S): 1000 INJECTION INTRAVENOUS; SUBCUTANEOUS at 13:19

## 2025-02-24 RX ADMIN — Medication 1 MILLIGRAM(S): at 02:20

## 2025-02-24 RX ADMIN — IPRATROPIUM BROMIDE AND ALBUTEROL SULFATE 3 MILLILITER(S): .5; 2.5 SOLUTION RESPIRATORY (INHALATION) at 10:46

## 2025-02-24 RX ADMIN — Medication 15 MILLILITER(S): at 17:25

## 2025-02-24 RX ADMIN — IPRATROPIUM BROMIDE AND ALBUTEROL SULFATE 3 MILLILITER(S): .5; 2.5 SOLUTION RESPIRATORY (INHALATION) at 13:19

## 2025-02-24 RX ADMIN — FLUTICASONE PROPIONATE 1 SPRAY(S): 50 SPRAY, METERED NASAL at 17:26

## 2025-02-24 RX ADMIN — ERYTHROMYCIN 1 APPLICATION(S): 5 OINTMENT OPHTHALMIC at 21:54

## 2025-02-24 RX ADMIN — HEPARIN SODIUM 5000 UNIT(S): 1000 INJECTION INTRAVENOUS; SUBCUTANEOUS at 06:03

## 2025-02-24 RX ADMIN — Medication 1 DROP(S): at 17:25

## 2025-02-24 RX ADMIN — Medication 1 APPLICATION(S): at 21:54

## 2025-02-24 RX ADMIN — ERYTHROMYCIN 1 APPLICATION(S): 5 OINTMENT OPHTHALMIC at 13:20

## 2025-02-24 RX ADMIN — IPRATROPIUM BROMIDE AND ALBUTEROL SULFATE 3 MILLILITER(S): .5; 2.5 SOLUTION RESPIRATORY (INHALATION) at 02:20

## 2025-02-24 RX ADMIN — Medication 1 APPLICATION(S): at 17:26

## 2025-02-24 RX ADMIN — Medication 1 DROP(S): at 21:54

## 2025-02-24 RX ADMIN — HEPARIN SODIUM 5000 UNIT(S): 1000 INJECTION INTRAVENOUS; SUBCUTANEOUS at 21:55

## 2025-02-24 RX ADMIN — Medication 150 MILLIGRAM(S): at 12:07

## 2025-02-24 RX ADMIN — ERYTHROMYCIN 1 APPLICATION(S): 5 OINTMENT OPHTHALMIC at 06:03

## 2025-02-24 RX ADMIN — Medication 200 MILLIGRAM(S): at 19:06

## 2025-02-24 RX ADMIN — Medication 1 APPLICATION(S): at 13:19

## 2025-02-24 RX ADMIN — FLUTICASONE PROPIONATE 1 SPRAY(S): 50 SPRAY, METERED NASAL at 06:04

## 2025-02-24 RX ADMIN — ERYTHROMYCIN 1 APPLICATION(S): 5 OINTMENT OPHTHALMIC at 17:26

## 2025-02-24 NOTE — PROGRESS NOTE ADULT - SUBJECTIVE AND OBJECTIVE BOX
Patient is a 44y old  Male who presents with a chief complaint of brain stem bleed (23 Feb 2025 00:44)      SUBJECTIVE / OVERNIGHT EVENTS:  No acute events overnight.  Afebrile.  No leukocytosis.   ANIKA resolved.    MEDICATIONS  (STANDING):  albuterol/ipratropium for Nebulization 3 milliLiter(s) Nebulizer every 4 hours  amLODIPine   Tablet 5 milliGRAM(s) Oral daily  artificial tears (preservative free) Ophthalmic Solution 1 Drop(s) Both EYES every 4 hours  chlorhexidine 0.12% Liquid 15 milliLiter(s) Oral Mucosa every 12 hours  doxazosin 8 milliGRAM(s) Oral at bedtime  erythromycin   Ointment 1 Application(s) Both EYES every 4 hours  fluticasone propionate 50 MICROgram(s)/spray Nasal Spray 1 Spray(s) Both Nostrils two times a day  heparin   Injectable 5000 Unit(s) SubCutaneous every 8 hours  influenza   Vaccine 0.5 milliLiter(s) IntraMuscular once  levETIRAcetam 1500 milliGRAM(s) Oral every 12 hours  LORazepam     Tablet 1 milliGRAM(s) Oral every 12 hours  pantoprazole   Suspension 40 milliGRAM(s) Oral daily  petrolatum Ophthalmic Ointment 1 Application(s) Both EYES every 4 hours  phenytoin   Suspension 150 milliGRAM(s) Oral <User Schedule>  phenytoin   Suspension 200 milliGRAM(s) Oral <User Schedule>  sodium chloride 0.65% Nasal 1 Spray(s) Both Nostrils two times a day    MEDICATIONS  (PRN):  acetaminophen   Oral Liquid .. 650 milliGRAM(s) Oral every 6 hours PRN Temp greater or equal to 38C (100.4F), Mild Pain (1 - 3)  LORazepam   Injectable 2 milliGRAM(s) IV Push once PRN Seizure Activity (NOTIFY PROVIDER PRIOR TO GIVING)        OBJECTIVE  Vital Signs Last 24 Hrs  T(C): 36.9 (24 Feb 2025 09:03), Max: 36.9 (24 Feb 2025 09:03)  T(F): 98.4 (24 Feb 2025 09:03), Max: 98.4 (24 Feb 2025 09:03)  HR: 84 (24 Feb 2025 09:06) (76 - 98)  BP: 104/72 (24 Feb 2025 08:27) (104/72 - 129/84)  BP(mean): 84 (24 Feb 2025 08:27) (84 - 100)  RR: 14 (24 Feb 2025 09:06) (14 - 16)  SpO2: 96% (24 Feb 2025 09:06) (94% - 100%)    Parameters below as of 24 Feb 2025 09:06  Patient On (Oxygen Delivery Method): ventilator    O2 Concentration (%): 50        PHYSICAL EXAM:    Gen:  awake , in bed    HEENT: trach in place   CV: RRR, +S1/S2  Pulm: adequate respiratory effort, no increase in work of breathing  Abd: soft, ND  Skin: warm and dry,   Neuro: awake, does not talk, opening eyes to voice     LABS:                                   10.2   5.82  )-----------( 204      ( 24 Feb 2025 06:29 )             31.5     02-24    138  |  102  |  49[H]  ----------------------------<  120[H]  4.2   |  25  |  1.20    Ca    9.4      24 Feb 2025 06:29  Phos  4.8     02-24  Mg     2.2     02-24            RADIOLOGY & ADDITIONAL TESTS:    Imaging Personally Reviewed:    Consultant(s) Notes Reviewed:      Care Discussed with Consultants/Other Providers:

## 2025-02-24 NOTE — PROGRESS NOTE ADULT - SUBJECTIVE AND OBJECTIVE BOX
HPI:  Unknown age male, unknown past medical history (reported stroke and MI by coworkers) presented to [facility] with AMS, Pt was working at [employer] when he was found down by coworkers. EMS called and pt brought to [facility] ED. Intubated, sedated, started on cardene for SBPs in 200s. CT head showed brain stem bleed. Transferred to NSICU for further management.  (30 Sep 2024 12:55)      Subjective:  GEORGE overnight.     Hospital course:  9/30: transferred from [facility]. A line placed. Versed dc'd. Cy Rader at bedside, states pt has family in [place], cannot confirm medications or PMH other than stroke and MI. 250cc bolus 3% given. LR switched to NS. hydralazine 25q8 started, 3% started, switched propofol to precedex   10/1: stability CTH done. Added labetalol, started TF. Palliative consulted. ethics consulted to determine surrogate. febrile 103, pan cx sent  10/2: BD 2, GEORGE overnight. TF resumed. Desatt'd to 80s, FiO2 inc. to 50. Fentanyl given, ABG, CXR ordered. Maxxed on precedex, started on propofol for DARIEN -4 - -5. Precedex dc'd. Duonebs, mucomyst, hypertonic added. 3% dc'd. Cardene dc'd. Start vanc/CTX. Increased labetalol 200q8. MRSA negative, dc'd vanc. ETT pulled back 2cm x 2, good positioning after confirmatory chest xray. Ethics attempting to establish HCP with family. Na 159, starting FW 250q6 for range 150-155.   10/3: BD3, GEORGE o/n, neuro stable. Na elevating, FW increased to 300q6. Dc'd bowel reg for diarrhea. vEEG started. SQH 5000q8 tonight.   10/4: BD 4, albumn bolus, incr. LR to 80 2/2 incr. in Cr, LR to 10 0cc/hr for uptrending Cr. Started 7% hypersal for 48hrs and SL atropine for inline/oral thick secretions. Dc'd CTX and started ancef for MSSA in the sputum. Nephrology consulted for CKD, f/u recs. SBP 170s, given hydralazine 10mg IVP.   10/5: BD5, o/n 10mg IVP hydralazine given for SBP 170s and started on hydralazine 25q8 via OGT. 10mg IV push labetalol for SBP > 160s. RT placed for diarrhea.   10/6: BD6, o/n FW increased to 350q4 per nephrology recs. IV tylenol for temp 100.6, SBp 160s presumed uncomfortable.   10/7: BD7, overnight pancultured for temp 101.8F.   10/8: BD8. GEORGE. Cr bumped. decreased LR to 75cc/hr. Adding simethicone ATC. incr hydralazine 50mgTID. Incr labetalol 300mgTID. Na 145, decreased FWF to 250q6. Start precedex. FENa consistent with intrinsic kidney injury. Pend repeat renal US. Retaining up to 1.3L, bladder scans q6, straight cath PRN  10/9: BD 9. GEORGE overnight. Neuro stable. abd xray for distention w non-specific gas pattern, OGT to LIWS for morning. duonebs/mucomyst to q8 for improving secretions. Changed tube feeds to Jevity 1.5 20cc/hr, low rate due to abdominal distention, nepro dense and more difficult to digest. Tolerating CPAP, confirmed by ABG.   10/10: BD 10. GEORGE overnight. Neuro stable. (+) gabriel for urinary retention on bladder scan. inc TF to goal rate of 40cc/hr. family leaning toward pursuing trach/PEG. 1/2 amp for FS 81.   10/11: BD 11. GEORGE overnight. Neuro stable. Trach/PEG consults placed.   10/12: BD 12. GEORGE overnight. Neuro stable. MRI brain complete.   10/13: BD 13. Increase flomax. Hold SQH after PM dose for trach tm. IVL.   10/14: BD 14. GEORGE overnight, remains on AC/VC. Gabriel placed for urinary retention. Dc'd free water.  S/p trach with pulm. NGT placed and CXR confirmed in good position.   10/15: BD 15, GEORGE ovn. resumed feeds. spiked 101, pan cx sent.   10/16: BD 16. GEORGE ovn. Lokelma 5mg for K+ 5.4. Started vanc q 24/zosyn for empiric PNA coverage, IVF to 100/hr. PEG held for fever.   10/17: BD 17,  ordered serum osm and urine osm for am. Started sinemet for neurostimulation. Increased cardura to 0.8. Started FW 100q4, dc'd IVF. MRSA negative, dc'd vanc. NGT replaced d/t coiling.   10/18: BD 18, GEORGE overnight, neuro stable. Amantadine added for neurostim. zosyn changed to unasyn for acinetobacter baumannii, failed TOV and required SC  10/19: BD 19, GEORGE ovn. cardura 2mg added for retention. labetalol decreased 200q8, hydralazine decreased 25q8. Gabriel replaced.   10/20: BD20, GEORGE overnight. NGT dislodged, replaced. PEG tomorrow w/ gen surg, FW increased to 150q4 and labetalol decreased to 100q8, lokelma given for hyperkalemia.   10/21: BD 21. POD0 PEG placement with Gen surg. decr labetolol to 50q8, incr. cardura to 0.4, started lokelma and phoslo, dc gabriel POD0 PEG placement with Gen surg.  10/22: BD 22. Plan to start TF today via PEG. dc labetalol, Following ophtho recs. Increased apnea settings - found to be in cheyne-moe respiration. CPAP 5/5.  10/23: BD 23. hydralazine d/c'd, trach collar trial today. Rectal tube placed at 6am.  10/24: BD24, o/n lokelma held due to diarrhea. Free water 100q6 resumed. dc'd tamsulosin, amantadine. Incr'd cardura to 8mg qhs. Dc'd FW. Switched jevity to nepro. gabriel placed for high urine output. Started SL atropine for oral secretions. Dc'd free water.  10/25: BD25, o/n decreased suctioning requirements to > q4hrs, GEORGE. Cr improving, cont phoslo, lokelma held at this time. Gabriel placed yest, cont. Tolerating trach collar. Given 500cc plasmalyte bolus for ANIKA. Dc'd sinemet.   10/26: BD26, o/n resumed lokelma 5mg daily and resumed 100cc free water q6hrs. Change in neuro status with new right pupillary dilation with anisocoria (right pupil 6mm fixed and left pupil 3mm briskly reactive). Given 23.4% NaCl bullet, taken for emergent CTH showing mostly resolved pontine hemorrhage, continued brainstem hypodensity likely edema d/t hemorrhage, no new hemorrhage or infarct, no herniation, mild increase in size of left lateral ventricle. Vitals remaine stable. Na goal > 140.   10/27: BD27, o/n GEORGE.Neuro stable. Pend stepdown with airway bed.   10/28: BD 28. GEORGE overnight. Neuro stable. Miralax ordered. Gabriel removed, pending TOV.  10/29: BD 29. GEORGE o/n. Given 2L NS over 8 hrs for increased BUN/Cr ratio. Gabriel placed for frequent straight cath.   10/30: BD 30.   10/31: BD 31. GEORGE overnight. Na 149, increased free water to 200q6. 1L NS for uptrending BUN.   11/1: BD 32. GEORGE overnight. Given 1L NS for dehydration. Na 146, increased FW to 250q6.   11/2: BD 33 GEORGE overnight, neuro stable, given 500cc bolus for net negative status and tachycardia   11/3: BD 34, GEORGE overnight, neuro stable. Patient remains tachycardic, EKG showing sinus tachycardia, given additional 500cc NS bolus. Febrile to 101.9F, pan cultured (without UA), CXR WNL, given tylenol.   11/4: BD 35, GEORGE overnight, neuro stable. Given 1L NS for tachycardia. sputum (+) for stenotropohomas maltophilia.   11/5: BD 36 GEORGE overnight, neuro stable. Vancomycin dc'd. Chest PT BID. ID consulted, cont zosyn.  11/6: BD 37. blood cx + klebsiella dc zosyn changed to cefepime, CTAP ordered, rpt blood cx sent.    11/7: BD 38. Pending CT A/P, given 250cc bolus and starting maintenance fluids overnight. Pending CT A/P after bolus   11/8: BD 39. CT CAP negative for infection.   11/9: BD 40. GEORGE overnight.  11/10: BD 41. GEORGE overnight. desat to 85 on trach collar, O2 inc to 10L and 100%, O2 sat inc to 95. pt tachy to 110s, euvolemic. given tylenol. ABG and CXR ordered. spiked fever, pancultured, RVP negative. AM ABG w pO2 79, rpt w pO2 79. pt appears comfortable, satting 94%.   11/11: BD 42. GEORGE overnight. pt became tachy to 130s, desat to 90 on 100% FiO2 and 10L. suctioned, (+) productive cough. temp 101.4, given 1g IV tylenol and 500cc NS bolus for euvolemia. fever and HR downtrending. LE dopplers negative for dvt  11/12: BD 43, GEORGE ovn, fever and HR downtrending, satting 97% 70% FIO2  11/13: BD 44, GEORGE ovn. started standing tylenol x24 hours for tachycardia. desat to 80s, o2 increased. CXR stable, pending CTA PE protocol.   11/14: BD 45, GEORGE overnight, neuro stable. resp therapy dec FiO2 to 70%.   11/15: BD 46, GEORGE overnight, neuro stable.  Rapid called for desaturation 30s, tachycardic 140s. Patient bagged, 100% fio2, heavily suctioned. CXR/POCUS unremarkable. ABG c/w desaturation. WBC 14.71. Afebrile. O2 improved to 90s and patient upgraded to ICU. ABG paO2 30s improved to 89 on vent. IV Tylenol x 1, sputum sent. Start protonix while o-n vent.   11/16: BD 47. POCUS showed collapsable IVCF, given 1L bolus. Vanco/Cefpime added empriic for PNA, NGT feeds restarted. MRSA swab neg, Vanc DC'd.   11/17: BD 48. GEORGE overnight. 1l bolus for tachycardia. Spiked to 101, cultured. 500cc bolus for tachycardia, tachy to 148 given 25mcg fentanyl, 250cc albumin, 1.5L bolus. 5 IV lopressor with response HR to 100s. +Stenotrophomonas on sputum cx.   11/18: BD 49. GEORGE overnight. Consulted ID, cefepime switched to bactrim x7days. Started hydrochlorothiazine 12.5mg daily.  11/19: BD 50. Tachy 120s, given tylenol and 500cc NS. Tolerating 5/5, switched to TCx. Holding phos binder. D/c Bactrim. D/c gabriel, f/u TOV. Dc'd PPI.   11/20: BD 51. GEORGE ovn. 1600 satting low 90s, mildly tachy to 110s, afebrile, RR wnl. O2 improved to mid 90s while inc O2 to 100% on TCx. CPAP 5/5 placed back on.  11/21: BD 52, GEORGE ovn, tolerating CPAP 5/5. Switch to trach collar during the day if tolerating well. HCTZ held for Cr bump, straight cath frequence increased to q4  11/22: BD 53, GEORGE ovn. Resumed phoslo. Gabriel placed. Resumed HCTZ.   11/23: BD 54. Holding tylenol in setting of possible fever, will require pan cx if febrile. Cr improved today. Cont CPAP. Bowel regimen held i/s/o diarrhea. FOBT negative.  11/24: BD 55. GEORGE overnight. Neuro stable. HCTZ dc'ed, started lisinopril 5. Lokelma dc'ed for K 3.7.   11/25: BD 56, GEORGE overnight. Neuro stable. dc'd lisinopril 5mg. Gabriel dc'd. TOV. 1545 noted to be hypotensive, MAP 50, in supine position on chair, HR 60s, afebrile, O2 96%. Given 1L cc NS bolus, placed back on bed in reverse trendelenberg, improved to map of 66. Neostick at bedside. Vitals check q1h. Dc'ed amlodipine. Failed TOV, bladder scan q6, sc prn. Added back Senna.   11/26: BD 57, GEORGE overnight. Neuro stable. Dc'd phoslo.   11/27: BD 58, GEORGE overnight. Neuro stable.    11/28: BD 59. Gabriel replaced.   11/29: GEORGE.  11/30: GEORGE, neuro stable.   12/1: BD 62, GEORGE overnight  12/2: BD 63, GEORGE overnight.; Given 1L bolus of LR for uptrending BUN/Cr.  12/3: BD 64. Reinstated eye gtt/moisture chamber given increased Rt eye injection  12/4: BD 65. GEORGE overnight. Attempted to speak with ophtho regarding eyelid weight/closure but no answer, full mailbox.   12/5: BD 66, GEORGE overnight, bowel regimen increased and had BM.   12/6: BD 67, GEORGE overnight, neuro stable.  12/7: GEORGE overnight, neuro stable. /110s, given x1 hydralazine 10 mg IVP. Restarted home amlodipine 5mg.  12/8: GEORGE. OOB to chair.     12/11: GEORGE, mucomyst added for thick secretions, simethicone for abd distension, abd xray with stool burden, increased bowel regimen.   12/12: GEORGE overnight.   12/13: GEORGE overnight.   12/14: GEORGE overnight  12/15: o/n Patient became tachycardic HR 120s and 10 minutes later O2 sat dropped as low as 89%. Patient suctioned without improvement in O2 sat and tube feeds found in suction catheter. TFs held.  STAT CXR ordered. STAT labs sent. Respiratory therapist called to bedside and patient trach connected to ventilator. After connection to ventilator and further suctioning O2 sat improved to 97% but patient HR remains 120-130s. Upgraded to NSICU for further management. Vancomycin and zosyn started. CTA  chest PE protocol and CTH ordered. Blood cultures sent.given 500cc bolus, rpt ABG sent pO2 243, CTH and CTA chest done. FS while NPO, FiO2 dec 50 pending ABG. sputum cx positive for few GPC and GNR.   12/16: GEORGE overnight, restarted amlodipine, troponin 75   12/17: GEORGE overnight, neuro stable. Attempted CPAP this morning, did not tolerate and back on full vent support. Dc'd Vanc. Tachycardia to 140s, noted extremities to be twitching along with jaw twitching. Given 2g of Keppra, total 4mg ativan and placed on EEG, full set of labs and lactate negative. Resumed trickle feeds at 20cc/hr. Given 250cc albumin for tachycardia.   12/18: GEORGE overnight. Neuro stable. CTH stable. EEG negative and dc'd.   12/19: GEORGE overnight. neuro stable. SIMV most of day, AC/VC at night.   12/20: GEORGE overnight, neuro stable. remains on VC/AC  12/21: GEORGE ovn. 1L bolus for tachy to 120s-130s.   12/22: GEORGE ovn. Tachy to 120s, given tylenol and IVF. 2mg IV ativan given for L jaw twitching. Sx resolved for 3 minutes and started again. Epilepsy contacted. Given 2mg IV ativan. Continued twitching. Increased keppra to 1500mg BID, 2mg ativan given. Propranolol started for refractory tachycardia. vEEG ordered. albumin given. POCUS performed, no b lines. Started on fosphenytoin, loaded w 20mg/kg then 100mg TID. febrile, pancx, started cefepime 2g q8, vancomycin  12/23: GEORGE ovn. +focal motor seizures on eeg. ID consulted. Vancomycin dc'ed as per ID.  12/24: GEORGE ovn, neuro stable. Baclofen 5 mg q12 started for hiccups. Na 129 from 134. Urine lytes c/w SIADH. Given 250cc 3% bolus. CT chest for infection w/u - f/u read. Repeat Na 136. UA negative  12/25: GEORGE ovn.   12/26: GEORGE ovn  12/27: GEORGE overnight. Blood cx neg @ 4 days, DC cefepime per medicine (sputum colonized).   12/28: Desaturation o/n to 80's, CXR obtained, pulse ox changed and sats resolved. Na 133 from 138, 250cc 3% bolus given. Cefepime resumed until 12/30 per ID. Rpt Na 138.  12/29: GEORGE overnight. Na stable.   12/30: GEORGE overnight. Family meeting with son, pt now DNR.   12/31: GEORGE overnight.   1/1: GEORGE overnight, neuro stable.  1/2: GEORGE overnight, neuro stable. FW decreased to 100q6.   1/3: GEORGE overnight, neuro stable. fosphenytoin IV changed to pheytoin PO via PEG per epilepsy recommendations  1/4: GEORGE overnight, neuro stable. FW incr. to 100q4  1/5: GEORGE overnight, neuro stable.   1/6: GEORGE overnight, neuro stable   1/7: GEORGE overnight, neuro stable. BUN/Cr increased, increased FW to 200q6. Given 1L bolus of LR over 2 hours. Gabriel d/c'd, voiding, continue bladder scan q6.   1/8: GEORGE overnight, neuro stable. BUN/Cr improving.  1/9: GEORGE overnight, neuro stable.   1/10: GEORGE overnight, neuro stable.   1/11: GEORGE overnight, neuro stable, vent settings stable.   1/12: GEORGE overnight, neuro stable. Febrile to 102F, f/u pan cx, chest xray, given tylenol. Zosyn started.   1/13: GEORGE overnight, neuro stable, cont Zosyn for presumed PNA, prelim sputum cx growing; few GS neg diplococci, mod GS neg rods, rare GS + rods, fever trend improved. Free water increased to [id number].   1/14: GEORGE overnight. pending renal US for elevated Cr  1/15: GEORGE ovn. renal US complete.   1/16: GEORGE overnight, neuro stable   1/17: GEORGE overnight, neuro stable   1/18: GEORGE overnight, neuro stable.   1/19: GEORGE overnight, neuro stable. Propranolol dec to 5q8.   1/20: GEORGE overnight, neuro stable. Weaned propranolol to 5mg BID.   1/21: GEORGE ovn, in AM having rapid vertical eye movements with facial twitching, 2g total keppra given for AM dose and phenytoin level ordered, vital signs stable. CTH stable. Phenytoin increased to 100/100/150mg per epilepsy  1/22: GEORGE ovn. IV hydral x 1 for SBP 170s.   1/23: Cont to have focal motor seizures. Per epilepsy, changed phenytoin dose to 100/100/200.   1/24: Phenytoin lvl tmrw AM. Chest Xray completed due to temp 100.2F  1/25: GEORGE overnight. Incr dilantin morning and afternoon per epilepsy.   1/26: GEORGE overnight. Sustained focal twitching noticed by nursing. Ativan 8mg in total given. Fosphenytoin 1500mg IV given. Placed on EEG. Epilepsy following. TFs held. Phenytoin increased to 150/150/200.   1/27: o/n CTH completed due to continued lethargy after ativan given yesterday afternoon. TFs resumed. 500cc bolus NS given for SBP 90s. EEG dc'ed, discussed w/ Dr. Tomlin. Febrile 101F, pan cx sent. Likely left sided infiltrate on CXR, c/f aspiration PNA. Started on vanc/zosyn. MRSA swab pending.   1/28: Neuro stable, on vanc/zosyn. +UTI on UA, MRSA negative. Vanc dc'd. RVP negative. Zosyn increased to pseudomonal dosing.   1/29: GEORGE overnight, neuro stable.  1/30: GEORGE overnight, neuro stable. Dc'd zosyn due to resistance, switched to Levaquin.  1/31: GEORGE ovenight, neuro stable.   2/1: Brief desat. Improved with neb and reposititioning. ABG and POCUS wnl.   2/4: GEORGE overnight  2/5: GEORGE overnight  2/6: GEORGE ovn  2/7: GEORGE ovn. L eye redness noted, started erythromycin and lacrilube to L eye as well. LR @ 100 cc/hr x 10 hours for uptrending BUN/Cr. Resumed bowel reg.   2/8: GEORGE overnight. Escalated bowel regimen.   2/9: GEORGE overnight.  2/10: GEORGE overnight. Social work to start working on SNF placement. Pending appointment with [employer] to come to hospital for biometrics.  2/11: GEORGE overnight. Neuro stable. Potassium improved (downtrending).   2/12: GEORGE overnight. Neuro stable. Lokelma 5mg x 1. Decrease FW to 200q8. 1L NS bolus given for hydration, uptrending BUN/Cr. Wound care rec barrier wipes for penile wound.  TFs changed to AdStack renal formula per nutrition.  2/13: GEORGE overnight. Neuro stable. Corneal abrasion noted on exam, ophtho re-consulted for L eye, eye drops changed per recs to q4. Pulm contacted for trach exchange, attempted at bedside but patient unable to tolerate with minimal sedation, will plan for tomorrow.   2/14: GEORGE overnight, given 1L of NS for low BP after fentanyl with improvement. Neuro exam stable, pend trach exchange today with pulm. Pend optho assessment for left eye. Trache exchanged. Desaturation during placement to 90%. FiO2 incr'd 55%. CXR negative for pneumothorax.   2/15: GEORGE overnight. Dest'd 88%: suctione, incr'd FiO2, PEEP. CXR/ABG/POCUS done with B-lines, 20mg IV lasix given.   2/16: GEORGE ovn, BAL from 2/13 during trach exchange growing moderate stenotrophomonas maltophilia, desat to 87% red rubber suctioning performed with improvements in O2 sat to 98%, Desturated again to 76%. Deep suctioned, ABG/CXR and lasix, incr. vent settings, Saturating 95%. Pulm rec'd dc mucomyst. Increased clonus while having another episode of desaturation, given 4mg ativan, clonus broke. O2 sats normalized with deep suction. Back on vEEG per gen neuro. CT chest and sputum cx obtained per ID.   2/17: GEORGE ovn. Blood cultures drawn. ID rec'd treatment for stenotrophomonas. EEG with concern for singular seizure activity. Keppra and Phenytoin levels were drawn. 2x blood cultures sent prior to starting DS Bactrim and Minocycline PO as per ID recs. Additional standing ativan added per epilepsy.   2/18: GEORGE ovn. Per epilepsy ativan decreased to 1mg q12. EEG dc'd.  2/19: GEORGE ovn. 500cc bolus for Cr bump over 5 hrs. Dc'd bactrim and minocycline. Start ertapanem as per ID.   2/20: GEORGE overnight, neuro stable. Cr bump likely 2/2 Bactrim, will monitor. TF adjusted off minocycline. DC propranolol and baclofen. Sent photo of L eye to ophtho, increased drops to q4.  2/21: GEORGE overnight, neuro stable. Keep Ertapenem per Dr. Velasco. FENa consistent with pre-renal ANIKA, 1L LR administered 125cc/hr.  2/22: GEORGE overnight, bowel regimen held due to loose stool.   2/23: o/n patient with desat to 80s, improved with suctioning and patient coughing up sputum.  2/24: GEORGE overnight    Vital Signs Last 24 Hrs  T(C): 36.7 (23 Feb 2025 22:27), Max: 37.1 (23 Feb 2025 08:58)  T(F): 98 (23 Feb 2025 22:27), Max: 98.8 (23 Feb 2025 08:58)  HR: 92 (24 Feb 2025 00:11) (76 - 106)  BP: 113/70 (24 Feb 2025 00:11) (107/75 - 129/84)  BP(mean): 92 (24 Feb 2025 00:11) (87 - 100)  RR: 16 (24 Feb 2025 00:11) (14 - 16)  SpO2: 96% (24 Feb 2025 00:11) (95% - 100%)    Parameters below as of 24 Feb 2025 00:11  Patient On (Oxygen Delivery Method): ventilator    O2 Concentration (%): 50    I&O's Summary    22 Feb 2025 07:01  -  23 Feb 2025 07:00  --------------------------------------------------------  IN: 1821 mL / OUT: 570 mL / NET: 1251 mL    23 Feb 2025 07:01  -  24 Feb 2025 01:24  --------------------------------------------------------  IN: 990 mL / OUT: 100 mL / NET: 890 mL        PHYSICAL EXAM:      General: patient seen laying supine in bed in NAD, Divehi speaking  Neuro: opens eyes, tracks vertically, Ox0,  RUE squeezes hand to command 2/5, RLE wiggles toes to command, LLE withdraws to noxious,  LUE 0/5  HEENT: PERRL, +trach to vent  Neck: supple  Cardiac: RRR, S1S2  Pulmonary: chest rise symmetric  Abdomen: soft, nontender, nondistended, +PEG  Ext: perfusing well  Skin: warm, dry    LABS:                        10.0   6.48  )-----------( 203      ( 23 Feb 2025 07:30 )             30.8     02-23    134[L]  |  97  |  47[H]  ----------------------------<  101[H]  4.3   |  24  |  1.27    Ca    9.2      23 Feb 2025 07:30  Phos  4.5     02-23  Mg     2.2     02-23        Urinalysis Basic - ( 23 Feb 2025 07:30 )    Color: x / Appearance: x / SG: x / pH: x  Gluc: 101 mg/dL / Ketone: x  / Bili: x / Urobili: x   Blood: x / Protein: x / Nitrite: x   Leuk Esterase: x / RBC: x / WBC x   Sq Epi: x / Non Sq Epi: x / Bacteria: x          CAPILLARY BLOOD GLUCOSE          Drug Levels: [] N/A  Phenytoin Level, Serum: 18.5 ug/mL (02-17 @ 12:57)    CSF Analysis: [] N/A      Allergies    Allergy Status Unknown    Intolerances      MEDICATIONS:  Antibiotics:  ertapenem  IVPB 1000 milliGRAM(s) IV Intermittent every 24 hours    Neuro:  acetaminophen   Oral Liquid .. 650 milliGRAM(s) Oral every 6 hours PRN  levETIRAcetam 1500 milliGRAM(s) Oral every 12 hours  LORazepam     Tablet 1 milliGRAM(s) Oral every 12 hours  LORazepam   Injectable 2 milliGRAM(s) IV Push once PRN  phenytoin   Suspension 150 milliGRAM(s) Oral <User Schedule>  phenytoin   Suspension 200 milliGRAM(s) Oral <User Schedule>    Anticoagulation:  heparin   Injectable 5000 Unit(s) SubCutaneous every 8 hours    OTHER:  albuterol/ipratropium for Nebulization 3 milliLiter(s) Nebulizer every 4 hours  amLODIPine   Tablet 5 milliGRAM(s) Oral daily  artificial tears (preservative free) Ophthalmic Solution 1 Drop(s) Both EYES every 4 hours  chlorhexidine 0.12% Liquid 15 milliLiter(s) Oral Mucosa every 12 hours  doxazosin 8 milliGRAM(s) Oral at bedtime  erythromycin   Ointment 1 Application(s) Both EYES every 4 hours  fluticasone propionate 50 MICROgram(s)/spray Nasal Spray 1 Spray(s) Both Nostrils two times a day  influenza   Vaccine 0.5 milliLiter(s) IntraMuscular once  pantoprazole   Suspension 40 milliGRAM(s) Oral daily  petrolatum Ophthalmic Ointment 1 Application(s) Both EYES every 4 hours  sodium chloride 0.65% Nasal 1 Spray(s) Both Nostrils two times a day    IVF:    CULTURES:  Culture Results:   No growth at 5 days (02-17 @ 12:10)  Culture Results:   No growth at 5 days (02-17 @ 12:10)    RADIOLOGY & ADDITIONAL TESTS:          ASSESSMENT:  44M PM ?stroke/MI present to [facility] after collapsing at work. Decorticate posturing, vomiting, intubated for airway protection. 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 Transferred to [facility] for further management. s/p trach 10/14. s/p peg 10/21. Re-upgrade to ICU 2/2 desaturation event and suctioning requirements 11/15. Re-upgrade to NSICU 12/15 2/2 desaturation and tachycardia.    Neuro:  - neuro/vitals q4h  - pain control: tylenol prn  - seizure tx: keppra 1500mg BID, phenytoin 150/150/200, ativan 1mg q12  - s/p vEEG (10/3-4), (10/17-10/19), (12/17-12/18), (12/22-12/25), (1/26-1/28), (2/16-2/18) +seizure on eeg 2/17  - CTH 9/30: enlarged pontine hemorrhage, CTH 10/3: stable, CTH 10/25: mostly resolved pontine hemorrhage, CTH 12/15: R mastoid air cell opacification; acute otitis media vs sterile effusion, CTH 12/18: stable. CTH 1/21 stable, CTH 1/27 stable  - MRI brain 10/12: parenchymal hemorrhage, acute/subacute R cerebellar stroke      CV:  - -160  - tachycardia resolved, s/p propranolol  - HTN: amlodipine 5mg  - echo (9/30) EF 75%, repeat 12/16: 57%    PULM:  - s/p trach exchange with pulm at bedside 2/14 to vent, AC/VC 40/400/14/6  - Secretions: duonebs/chest PT q4h  - CT chest 2/17: Near complete collapse b/l lower lobes. Patchy opacity within LLL/ALONDRA  - pulmonology consulted, recs appreciated    GI:  - TFs via PEG, candelaria roseanna renal  - bowel regimen held due to loose stool, last BM 2/23    Renal:  - IVL  - FW 200q8 for hydration  - Voiding, SC prn  - CKD: trend BUN/Cr  - renal US 10/1, 10/8, 1/15: Increased bilateral renal echogenicity consistent with medical renal disease    Endo:  - A1c 5.4    Heme:  - DVT ppx: SCDs, SQH 5000u q8h   - LE dopplers negative 12/16    ID:  - sputum 2/16 +Enterobacter cloacae: Ertapenem (2/19- ) per ID recs, likely conolonized with stenotrophomonas  - +klebsiella UTI s/p levaquin (1/30-2/3)  - empiric PNA: cefepime (12/22-12/30), s/p vanc (12/22-12/23), s/p zosyn (12/15-12/20), s/p vanc (12/15-12/16), s/p zosyn (1/12 -1/18), s/p DS bactrim 2 tabs TID, 200 mg minocycline BID (2/18)  - +klebsiella UTI s/p   - s/p Stenotrophomonas maltophilia PNA: s/p Bactrim (11/18-11/19) s/p Cefepime (11/16-11/18)  - 11/3, (+) sputum for stenotrophomas maltophlia, blood cx (+) klebsiella, cefepime 2gq12 (11/6 - 11/12)   - S/p Ancef (10/4-10/14) for PNA, and s/p Unasyn (10/18-10/23) +actinobacter baumanii     MISC:  - BL keratitis: BL erythromycin ointment, artificial tears, lacrilube q4, moisture chamber   - Penile wound: wound care rec barrier wipes     Dispo: SDU status, DNR, pending SNF    D/w Dr. D'Amico     Assessment:  Present when checked    []  GCS  E   V  M     Heart Failure: []Acute, [] acute on chronic , []chronic  Heart Failure:  [] Diastolic (HFpEF), [] Systolic (HFrEF), []Combined (HFpEF and HFrEF), [] RHF, [] Pulm HTN, [] Other    [] ANIKA, [] ATN, [] AIN, [] other  [] CKD1, [] CKD2, [] CKD 3, [] CKD 4, [] CKD 5, []ESRD    Encephalopathy: [] Metabolic, [] Hepatic, [] toxic, [] Neurological, [] Other    Abnormal Nurtitional Status: [] malnurtition (see nutrition note), [ ]underweight: BMI < 19, [] morbid obesity: BMI >40, [] Cachexia    [] Sepsis  [] hypovolemic shock,[] cardiogenic shock, [] hemorrhagic shock, [] neuogenic shock  [] Acute Respiratory Failure  []Cerebral edema, [] Brain compression/ herniation,   [] Functional quadriplegia  [] Acute blood loss anemia

## 2025-02-24 NOTE — PROGRESS NOTE ADULT - ASSESSMENT
44M with unclear PMH (?stroke, MI) who was found down at work, intubated for airway protection and found to have acute parenchymal hemorrhage within nabil with mass effect (+ acute/subacute right cerebellar infarct) in setting of hypertensive emergency, transferred to [facility] for further neurosurgical care. Hospital course c/b poor neurologic recovery s/p trach-PEG, AUR s/p gabriel, ANIKA on CKD c/b hyperkalemia, HAP s/p amp-sulbactam (EOT 10/23), K aerogenes bacteremia  treated with 2 weeks of Cefepime per ID , On 11/15 he became septic and was transferred to NSICU due to increased o2 requirements and needed Vent support , Trach Cultures + for Stenotrophomonas which was treated with 7 days of Bactrim per ID , has been weaned of Vent since 11/23 and is now on 10lts at 40% o2 through Trach Collar. Stepped up to the NSICU on 12/15 for hypoxia and tachycardia. Pt s/p  abx for 5 days. Pt now stepped down to [facility]Lach.    # Pontine hemorrhage  # Encephalopathy due to Intracranial Bleed   - Acute parenchymal hemorrhage within nabil with mass effect (+ acute/subacute right cerebellar infarct) in setting of hypertensive emergency.   - MRI brain also demonstrated acute/subacute R cerebellar stroke.   - No Neurosurgical Interventions were performed   - Trach and PEG Dependent    # Seizures  - Pt with episode of facial and leg twitching on 2/16 which resolved after the pt was given ativan 4mg x1. Off vEEG since 2/18   - Continue Seizure precautions   - Continue Keppra and phenytoin    # Hypertension  - Continue amlodipine 5mg daily with holding parameters    # Acute on Chronic respiratory failure   - s/p percutaneous trach by pulm on 10/14/24 - Trach Exchanged by Pulmonary on 2/14/25   - Pt's BAL on 2/13 growing Stenotrophomonas and Enterobacter Cloacae complex  - Continue vent support and chest PT  - F/u ID recommendations - Bactrim and Minocycline 2/17  switched to Ertapenem on 2/19 - End date 2/23 per ID   - Given numerous infectious foci, prolonged hospitalization, recurrent infections and need for broad spectrum antibiotics coverage, without the potential of further clinical recovery, would discuss consideration of no escalation of antibiotics past this current regimen.    # Neurogenic dysphagia.   - Pt s/p PEG placement by  surgery 10/21/24  - Continue TF per nutrition  - Continue aspiration precautions and elevation of HOB.    # Acute urinary retention.   - doxazosin 8mg qHS   - Chronic Gabriel     # Functional quadriplegia in setting of brainstem hemorrhage  - decub precautions  - care per nursing protocol     # ANIKA on CKD stage III   - Baseline Creatinine 1.1  - Resolved with IV Fluids     # Anemia of Chronic Disease     # DVT prop  -Heparin SQ TID    # Dispo : Awaiting Transfer to [facility]

## 2025-02-25 LAB
ANION GAP SERPL CALC-SCNC: 13 MMOL/L — SIGNIFICANT CHANGE UP (ref 5–17)
BUN SERPL-MCNC: 50 MG/DL — HIGH (ref 7–23)
CALCIUM SERPL-MCNC: 9.2 MG/DL — SIGNIFICANT CHANGE UP (ref 8.4–10.5)
CHLORIDE SERPL-SCNC: 98 MMOL/L — SIGNIFICANT CHANGE UP (ref 96–108)
CO2 SERPL-SCNC: 26 MMOL/L — SIGNIFICANT CHANGE UP (ref 22–31)
CREAT SERPL-MCNC: 1.25 MG/DL — SIGNIFICANT CHANGE UP (ref 0.5–1.3)
EGFR: 72 ML/MIN/1.73M2 — SIGNIFICANT CHANGE UP
EGFR: 72 ML/MIN/1.73M2 — SIGNIFICANT CHANGE UP
GLUCOSE SERPL-MCNC: 110 MG/DL — HIGH (ref 70–99)
HCT VFR BLD CALC: 30.7 % — LOW (ref 39–50)
HGB BLD-MCNC: 10.1 G/DL — LOW (ref 13–17)
MAGNESIUM SERPL-MCNC: 2.2 MG/DL — SIGNIFICANT CHANGE UP (ref 1.6–2.6)
MCHC RBC-ENTMCNC: 31.3 PG — SIGNIFICANT CHANGE UP (ref 27–34)
MCHC RBC-ENTMCNC: 32.9 G/DL — SIGNIFICANT CHANGE UP (ref 32–36)
MCV RBC AUTO: 95 FL — SIGNIFICANT CHANGE UP (ref 80–100)
NRBC BLD AUTO-RTO: 0 /100 WBCS — SIGNIFICANT CHANGE UP (ref 0–0)
PHOSPHATE SERPL-MCNC: 4.1 MG/DL — SIGNIFICANT CHANGE UP (ref 2.5–4.5)
PLATELET # BLD AUTO: 194 K/UL — SIGNIFICANT CHANGE UP (ref 150–400)
POTASSIUM SERPL-MCNC: 4.3 MMOL/L — SIGNIFICANT CHANGE UP (ref 3.5–5.3)
POTASSIUM SERPL-SCNC: 4.3 MMOL/L — SIGNIFICANT CHANGE UP (ref 3.5–5.3)
RBC # BLD: 3.23 M/UL — LOW (ref 4.2–5.8)
RBC # FLD: 12 % — SIGNIFICANT CHANGE UP (ref 10.3–14.5)
SODIUM SERPL-SCNC: 137 MMOL/L — SIGNIFICANT CHANGE UP (ref 135–145)
WBC # BLD: 5.97 K/UL — SIGNIFICANT CHANGE UP (ref 3.8–10.5)
WBC # FLD AUTO: 5.97 K/UL — SIGNIFICANT CHANGE UP (ref 3.8–10.5)

## 2025-02-25 PROCEDURE — 99232 SBSQ HOSP IP/OBS MODERATE 35: CPT

## 2025-02-25 PROCEDURE — 99231 SBSQ HOSP IP/OBS SF/LOW 25: CPT

## 2025-02-25 RX ADMIN — Medication 40 MILLIGRAM(S): at 12:06

## 2025-02-25 RX ADMIN — Medication 1 SPRAY(S): at 07:00

## 2025-02-25 RX ADMIN — HEPARIN SODIUM 5000 UNIT(S): 1000 INJECTION INTRAVENOUS; SUBCUTANEOUS at 14:05

## 2025-02-25 RX ADMIN — Medication 15 MILLILITER(S): at 05:19

## 2025-02-25 RX ADMIN — Medication 1 DROP(S): at 05:20

## 2025-02-25 RX ADMIN — Medication 200 MILLIGRAM(S): at 19:27

## 2025-02-25 RX ADMIN — FLUTICASONE PROPIONATE 1 SPRAY(S): 50 SPRAY, METERED NASAL at 17:27

## 2025-02-25 RX ADMIN — Medication 1 APPLICATION(S): at 17:27

## 2025-02-25 RX ADMIN — IPRATROPIUM BROMIDE AND ALBUTEROL SULFATE 3 MILLILITER(S): .5; 2.5 SOLUTION RESPIRATORY (INHALATION) at 05:20

## 2025-02-25 RX ADMIN — LEVETIRACETAM 1500 MILLIGRAM(S): 10 INJECTION, SOLUTION INTRAVENOUS at 17:25

## 2025-02-25 RX ADMIN — Medication 1 DROP(S): at 17:26

## 2025-02-25 RX ADMIN — LEVETIRACETAM 1500 MILLIGRAM(S): 10 INJECTION, SOLUTION INTRAVENOUS at 05:19

## 2025-02-25 RX ADMIN — DOXAZOSIN MESYLATE 8 MILLIGRAM(S): 8 TABLET ORAL at 21:02

## 2025-02-25 RX ADMIN — Medication 150 MILLIGRAM(S): at 12:07

## 2025-02-25 RX ADMIN — ERYTHROMYCIN 1 APPLICATION(S): 5 OINTMENT OPHTHALMIC at 21:01

## 2025-02-25 RX ADMIN — ERYTHROMYCIN 1 APPLICATION(S): 5 OINTMENT OPHTHALMIC at 17:27

## 2025-02-25 RX ADMIN — Medication 150 MILLIGRAM(S): at 03:05

## 2025-02-25 RX ADMIN — Medication 1 DROP(S): at 01:30

## 2025-02-25 RX ADMIN — Medication 15 MILLILITER(S): at 17:28

## 2025-02-25 RX ADMIN — Medication 1 APPLICATION(S): at 14:05

## 2025-02-25 RX ADMIN — ERYTHROMYCIN 1 APPLICATION(S): 5 OINTMENT OPHTHALMIC at 05:20

## 2025-02-25 RX ADMIN — IPRATROPIUM BROMIDE AND ALBUTEROL SULFATE 3 MILLILITER(S): .5; 2.5 SOLUTION RESPIRATORY (INHALATION) at 10:52

## 2025-02-25 RX ADMIN — Medication 1 DROP(S): at 14:05

## 2025-02-25 RX ADMIN — Medication 1 DROP(S): at 10:53

## 2025-02-25 RX ADMIN — IPRATROPIUM BROMIDE AND ALBUTEROL SULFATE 3 MILLILITER(S): .5; 2.5 SOLUTION RESPIRATORY (INHALATION) at 17:24

## 2025-02-25 RX ADMIN — Medication 1 APPLICATION(S): at 05:20

## 2025-02-25 RX ADMIN — HEPARIN SODIUM 5000 UNIT(S): 1000 INJECTION INTRAVENOUS; SUBCUTANEOUS at 05:19

## 2025-02-25 RX ADMIN — FLUTICASONE PROPIONATE 1 SPRAY(S): 50 SPRAY, METERED NASAL at 05:19

## 2025-02-25 RX ADMIN — Medication 1 DROP(S): at 21:02

## 2025-02-25 RX ADMIN — Medication 1 APPLICATION(S): at 10:53

## 2025-02-25 RX ADMIN — IPRATROPIUM BROMIDE AND ALBUTEROL SULFATE 3 MILLILITER(S): .5; 2.5 SOLUTION RESPIRATORY (INHALATION) at 14:05

## 2025-02-25 RX ADMIN — Medication 1 APPLICATION(S): at 01:29

## 2025-02-25 RX ADMIN — IPRATROPIUM BROMIDE AND ALBUTEROL SULFATE 3 MILLILITER(S): .5; 2.5 SOLUTION RESPIRATORY (INHALATION) at 01:29

## 2025-02-25 RX ADMIN — Medication 1 APPLICATION(S): at 21:02

## 2025-02-25 RX ADMIN — Medication 1 MILLIGRAM(S): at 01:29

## 2025-02-25 RX ADMIN — ERYTHROMYCIN 1 APPLICATION(S): 5 OINTMENT OPHTHALMIC at 01:30

## 2025-02-25 RX ADMIN — Medication 1 SPRAY(S): at 17:27

## 2025-02-25 RX ADMIN — ERYTHROMYCIN 1 APPLICATION(S): 5 OINTMENT OPHTHALMIC at 10:53

## 2025-02-25 RX ADMIN — HEPARIN SODIUM 5000 UNIT(S): 1000 INJECTION INTRAVENOUS; SUBCUTANEOUS at 21:01

## 2025-02-25 RX ADMIN — Medication 1 MILLIGRAM(S): at 12:06

## 2025-02-25 RX ADMIN — ERYTHROMYCIN 1 APPLICATION(S): 5 OINTMENT OPHTHALMIC at 14:05

## 2025-02-25 RX ADMIN — IPRATROPIUM BROMIDE AND ALBUTEROL SULFATE 3 MILLILITER(S): .5; 2.5 SOLUTION RESPIRATORY (INHALATION) at 21:01

## 2025-02-25 NOTE — PROGRESS NOTE ADULT - ASSESSMENT
44M with unclear PMH (?stroke, MI) who was found down at work, intubated for airway protection and found to have acute parenchymal hemorrhage within nabil with mass effect (+ acute/subacute right cerebellar infarct) in setting of hypertensive emergency, transferred to [facility] for further neurosurgical care. Hospital course c/b poor neurologic recovery s/p trach-PEG, AUR s/p gabriel, ANIKA on CKD c/b hyperkalemia, HAP s/p amp-sulbactam (EOT 10/23), K aerogenes bacteremia  treated with 2 weeks of Cefepime per ID , On 11/15 he became septic and was transferred to NSICU due to increased o2 requirements and needed Vent support , Trach Cultures + for Stenotrophomonas which was treated with 7 days of Bactrim per ID , has been weaned of Vent since 11/23 and is now on 10lts at 40% o2 through Trach Collar. Stepped up to the NSICU on 12/15 for hypoxia and tachycardia. Pt s/p  abx for 5 days. Pt now stepped down to [facility].    # Pontine hemorrhage  # Encephalopathy due to Intracranial Bleed   - Acute parenchymal hemorrhage within nabil with mass effect (+ acute/subacute right cerebellar infarct) in setting of hypertensive emergency.   - MRI brain also demonstrated acute/subacute R cerebellar stroke.   - No Neurosurgical Interventions were performed   - Trach and PEG Dependent    # Seizures  - Pt with episode of facial and leg twitching on 2/16 which resolved after the pt was given ativan 4mg x1. Off vEEG since 2/18   - Continue Seizure precautions   - Continue Keppra and phenytoin    # Hypertension  - Continue amlodipine 5mg daily with holding parameters    # Acute on Chronic respiratory failure   - s/p percutaneous trach by pulm on 10/14/24 - Trach Exchanged by Pulmonary on 2/14/25   - Pt's BAL on 2/13 growing Stenotrophomonas and Enterobacter Cloacae complex  - Continue vent support and chest PT  - F/u ID recommendations - Bactrim and Minocycline 2/17  switched to Ertapenem on 2/19 - End date 2/23 per ID   - Given numerous infectious foci, prolonged hospitalization, recurrent infections and need for broad spectrum antibiotics coverage, without the potential of further clinical recovery, would discuss consideration of no escalation of antibiotics past this current regimen.    # Neurogenic dysphagia.   - Pt s/p PEG placement by  surgery 10/21/24  - Continue TF per nutrition  - Continue aspiration precautions and elevation of HOB.    # Acute urinary retention.   - doxazosin 8mg qHS   - Chronic Gabriel     # Functional quadriplegia in setting of brainstem hemorrhage  - decub precautions  - care per nursing protocol     # ANIKA on CKD stage III   - Baseline Creatinine 1.1  - Resolved with IV Fluids   - Increase Free water to 200q6h    # Anemia of Chronic Disease     # DVT prop  -Heparin SQ TID    # Dispo : Awaiting Transfer to [facility]

## 2025-02-25 NOTE — PROGRESS NOTE ADULT - SUBJECTIVE AND OBJECTIVE BOX
Patient is a 44y old  Male who presents with a chief complaint of brain stem bleed (23 Feb 2025 00:44)      SUBJECTIVE / OVERNIGHT EVENTS:  No acute events overnight.  Afebrile.  No leukocytosis.   ANIKA resolved.    MEDICATIONS  (STANDING):  albuterol/ipratropium for Nebulization 3 milliLiter(s) Nebulizer every 4 hours  amLODIPine   Tablet 5 milliGRAM(s) Oral daily  artificial tears (preservative free) Ophthalmic Solution 1 Drop(s) Both EYES every 4 hours  chlorhexidine 0.12% Liquid 15 milliLiter(s) Oral Mucosa every 12 hours  doxazosin 8 milliGRAM(s) Oral at bedtime  erythromycin   Ointment 1 Application(s) Both EYES every 4 hours  fluticasone propionate 50 MICROgram(s)/spray Nasal Spray 1 Spray(s) Both Nostrils two times a day  heparin   Injectable 5000 Unit(s) SubCutaneous every 8 hours  influenza   Vaccine 0.5 milliLiter(s) IntraMuscular once  levETIRAcetam 1500 milliGRAM(s) Oral every 12 hours  LORazepam     Tablet 1 milliGRAM(s) Oral every 12 hours  pantoprazole   Suspension 40 milliGRAM(s) Oral daily  petrolatum Ophthalmic Ointment 1 Application(s) Both EYES every 4 hours  phenytoin   Suspension 150 milliGRAM(s) Oral <User Schedule>  phenytoin   Suspension 200 milliGRAM(s) Oral <User Schedule>  sodium chloride 0.65% Nasal 1 Spray(s) Both Nostrils two times a day    MEDICATIONS  (PRN):  acetaminophen   Oral Liquid .. 650 milliGRAM(s) Oral every 6 hours PRN Temp greater or equal to 38C (100.4F), Mild Pain (1 - 3)  LORazepam   Injectable 2 milliGRAM(s) IV Push once PRN Seizure Activity (NOTIFY PROVIDER PRIOR TO GIVING)        OBJECTIVE  Vital Signs Last 24 Hrs  T(C): 36.9 (25 Feb 2025 08:59), Max: 37.5 (24 Feb 2025 22:24)  T(F): 98.5 (25 Feb 2025 08:59), Max: 99.5 (24 Feb 2025 22:24)  HR: 76 (25 Feb 2025 12:48) (72 - 98)  BP: 102/67 (25 Feb 2025 12:06) (102/67 - 123/81)  BP(mean): 80 (25 Feb 2025 12:06) (80 - 98)  RR: 16 (25 Feb 2025 12:06) (14 - 16)  SpO2: 95% (25 Feb 2025 12:48) (94% - 97%)    Parameters below as of 25 Feb 2025 12:06  Patient On (Oxygen Delivery Method): ventilator    O2 Concentration (%): 50        PHYSICAL EXAM:    Gen:  awake , in bed    HEENT: trach in place   CV: RRR, +S1/S2  Pulm: adequate respiratory effort, no increase in work of breathing  Abd: soft, ND  Skin: warm and dry,   Neuro: awake, does not talk, opening eyes to voice     LABS:                                              10.1   5.97  )-----------( 194      ( 25 Feb 2025 05:30 )             30.7     02-25    137  |  98  |  50[H]  ----------------------------<  110[H]  4.3   |  26  |  1.25    Ca    9.2      25 Feb 2025 05:30  Phos  4.1     02-25  Mg     2.2     02-25                RADIOLOGY & ADDITIONAL TESTS:    Imaging Personally Reviewed:    Consultant(s) Notes Reviewed:      Care Discussed with Consultants/Other Providers:

## 2025-02-25 NOTE — CHART NOTE - NSCHARTNOTEFT_GEN_A_CORE
Admitting Diagnosis:   Patient is a 44y old  Male who presents with a chief complaint of brain stem bleed (25 Feb 2025 13:25)  PAST MEDICAL & SURGICAL HISTORY:  Acute myocardial infarction  CVA (cerebral vascular accident)      Current Nutrition Order: Maribeth Sauceda renal 1.8 formula: at goal of 59mL/hour x 18 hours; this provides ~1062mL total volume, 1912 calories, ~85g protein, ~701mL free water; this meets ~% calorie needs, % of protein needs    PO Intake: N/A related to NPO status with tube feedings    GI Issues: Per flowsheets, last BM 2/25/25 (fecal incontinence); abdomen soft/nontender; bowel sounds present. No GI upset noted.     Pain: No non-verbal indicators of pain noted in flowsheets.    Skin Integrity:  Mookie score 12  1+ edema to the bilateral arm region  Skin tear to penis   Unstageable pressure injury to penis    02-24-25 @ 07:01  -  02-25-25 @ 07:00  --------------------------------------------------------  IN: 1681 mL / OUT: 675 mL / NET: 1006 mL    02-25-25 @ 07:01  -  02-25-25 @ 14:59  --------------------------------------------------------  IN: 495 mL / OUT: 150 mL / NET: 345 mL        Labs:   02-25    137  |  98  |  50[H]  ----------------------------<  110[H]  4.3   |  26  |  1.25    Ca    9.2      25 Feb 2025 05:30  Phos  4.1     02-25  Mg     2.2     02-25    CAPILLARY BLOOD GLUCOSE    Medications:  MEDICATIONS  (STANDING):  albuterol/ipratropium for Nebulization 3 milliLiter(s) Nebulizer every 4 hours  amLODIPine   Tablet 5 milliGRAM(s) Oral daily  artificial tears (preservative free) Ophthalmic Solution 1 Drop(s) Both EYES every 4 hours  chlorhexidine 0.12% Liquid 15 milliLiter(s) Oral Mucosa every 12 hours  doxazosin 8 milliGRAM(s) Oral at bedtime  erythromycin   Ointment 1 Application(s) Both EYES every 4 hours  fluticasone propionate 50 MICROgram(s)/spray Nasal Spray 1 Spray(s) Both Nostrils two times a day  heparin   Injectable 5000 Unit(s) SubCutaneous every 8 hours  influenza   Vaccine 0.5 milliLiter(s) IntraMuscular once  levETIRAcetam 1500 milliGRAM(s) Oral every 12 hours  LORazepam     Tablet 1 milliGRAM(s) Oral every 12 hours  pantoprazole   Suspension 40 milliGRAM(s) Oral daily  petrolatum Ophthalmic Ointment 1 Application(s) Both EYES every 4 hours  phenytoin   Suspension 150 milliGRAM(s) Oral <User Schedule>  phenytoin   Suspension 200 milliGRAM(s) Oral <User Schedule>  sodium chloride 0.65% Nasal 1 Spray(s) Both Nostrils two times a day    MEDICATIONS  (PRN):  acetaminophen   Oral Liquid .. 650 milliGRAM(s) Oral every 6 hours PRN Temp greater or equal to 38C (100.4F), Mild Pain (1 - 3)  LORazepam   Injectable 2 milliGRAM(s) IV Push once PRN Seizure Activity (NOTIFY PROVIDER PRIOR TO GIVING)    Dosing Anthropometrics:   Height for BMI (FEET)	5 Feet  Height for BMI (INCHES)	8 Inch(s)  Height for BMI (CM)	172.72 Centimeter(s)  Weight for BMI (lbs)	176.4 lb  Weight for BMI (kg)	80 kg  Body Mass Index	              26.8  ideal body weight:               154 pounds / 70 kg   % ideal body weight             114%       Weight Change: Recommend nursing to obtain weekly wt to track/trend changes.   RD obtained bedweight today (1/23/25) of ~79.5kg, consistent with admission weight. RD to follow up with nursing.   RD obtained bedweight today (2/4/25) of ~76.1kg, consistent with admission weight. RD to follow up with nursing.   RD obtained bedweight today (2/11/25) of ~76.2kg, not significantly decreased compared to admission wt.      Estimated energy needs:   Weight used for calculations: most recent actual body weight  weight: 76.1kg (~167%)  calories: 25-30 calories/kg = ~4950-4763 calories/day  protein: 1.1-1.4g protein/kg = ~84-107g protein/day  fluids: 1mL/kcal = ~1903-2283mL/day  Other Calculations	Pt is ~% ideal body weight (~109%), thus actual body weight used for all calculations. Needs adjusted for age, clinical course, ventilation.     Subjective:   This is a 44 year old male, unknown past medical history (reported stroke and MI by coworkers) presented to [facility] with AMS, Pt was working at [employer] when he was found down by coworkers. EMS called and pt brought to [facility] ED. Intubated, sedated, started on cardene for SBPs in 200s. CT head showed brain stem bleed. Transferred to NSICU for further management. Pt is status post tracheostomy 10/14. Pt is status post PEG on 10/21. Re-upgrade to ICU in the setting of desaturation event and suctioning requirements 11/15. Re-upgrade to NSICU 12/15 2/2 desaturation and tachycardia. Pt has since been stepped down to [facility].     Patient seen at bedside on [facility] for nutrition follow up. Pt afebrile, vented to VC-AC, SpO2 95-97%, not sedated, not on pressors. Pt lethargic per nursing, pt continues to have a tracheostomy, unable to speak. RD spoke to nursing on the unit, pt was resting in bed. Current diet order: NPO with tube feeds of Ad Dynamo Renal formula, at a goal rate of 59mL/hour for a 18 hours (pt's medication, minocycline, was discontinued, thus tube feeding regimen was changed), pt is meeting ~100% estimated nutrient needs. GI within normal limits at this time. Labs and medications reviewed. Notable labs: BUN 50 <H>, other labs within normal limits, medical team is aware, RD to monitor trends. RD will continue to follow, please see nutrition recommendations below.    Previous Nutrition Diagnosis: Inadequate Oral Intake related to inability to meet nutritional demands via PO as evidenced by NPO with tube feedings    Active [ x ]  Resolved [   ]     Goal: Pt will consume >/= 75% of estimated nutrient needs via tolerated route     Nutrition Recommendations:  1. Continue Maribeth Sauceda renal via gastrostomy, goal of 60mL/hour x 18 hours, providing 1080mL total volume, 1944 calories, 86g protein, ~713mL free water; this meets ~24 calories/kg ideal body weight, ~1.1g protein/kg ideal body weight**  **Provide Banatrol Tube Feeding prn**  **Maintain aspiration precautions at all times; monitor for signs/symptoms of intolerance**  2. Monitor weights (weekly), GI function, skin integrity; electrolytes, BMP, glucose, CBC per MD discretion *renal indices*  3. Pain and bowel regimen per medical team  4. Align nutrition with goals of care at all times  5. Recommend nursing to obtain new weights to track/trend changes    Education: Deferred at this time related to pt resting in bed, and cognitive status.     Risk Level: High [  ] Moderate [ x ] Low [   ].

## 2025-02-25 NOTE — PROGRESS NOTE ADULT - SUBJECTIVE AND OBJECTIVE BOX
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 LLL/ALONDRA  - pulmonology consulted, recs appreciated    GI:  - TFs via PEG, candelaria roseanna renal  - bowel regimen held due to loose stool, last BM 2/23    Renal:  - IVL  - FW 200q8 for hydration  - Voiding, SC prn  - CKD: trend BUN/Cr  - renal US 10/1, 10/8, 1/15: Increased bilateral renal echogenicity consistent with medical renal disease    Endo:  - A1c 5.4    Heme:  - DVT ppx: SCDs, SQH 5000u q8h   - LE dopplers negative 12/16    ID:  - sputum 2/16 +Enterobacter cloacae: Ertapenem (2/19-23) per ID recs, likely conolonized with stenotrophomonas  - +klebsiella UTI s/p levaquin (1/30-2/3)     MISC:  - BL keratitis: BL erythromycin ointment, artificial tears, lacrilube q4, moisture chamber   - Penile wound: wound care rec barrier wipes     Dispo: SDU status, DNR, pending SNF    D/w Dr. D'Amico

## 2025-02-26 PROCEDURE — 99231 SBSQ HOSP IP/OBS SF/LOW 25: CPT

## 2025-02-26 PROCEDURE — 99233 SBSQ HOSP IP/OBS HIGH 50: CPT

## 2025-02-26 RX ORDER — DOXAZOSIN MESYLATE 8 MG/1
4 TABLET ORAL AT BEDTIME
Refills: 0 | Status: DISCONTINUED | OUTPATIENT
Start: 2025-02-26 | End: 2025-03-13

## 2025-02-26 RX ORDER — LORAZEPAM 4 MG/ML
1 VIAL (ML) INJECTION EVERY 12 HOURS
Refills: 0 | Status: DISCONTINUED | OUTPATIENT
Start: 2025-02-26 | End: 2025-03-04

## 2025-02-26 RX ADMIN — Medication 1 MILLIGRAM(S): at 01:27

## 2025-02-26 RX ADMIN — IPRATROPIUM BROMIDE AND ALBUTEROL SULFATE 3 MILLILITER(S): .5; 2.5 SOLUTION RESPIRATORY (INHALATION) at 01:27

## 2025-02-26 RX ADMIN — Medication 15 MILLILITER(S): at 05:24

## 2025-02-26 RX ADMIN — DOXAZOSIN MESYLATE 4 MILLIGRAM(S): 8 TABLET ORAL at 21:19

## 2025-02-26 RX ADMIN — Medication 1 SPRAY(S): at 05:23

## 2025-02-26 RX ADMIN — Medication 1 APPLICATION(S): at 15:06

## 2025-02-26 RX ADMIN — Medication 150 MILLIGRAM(S): at 11:36

## 2025-02-26 RX ADMIN — HEPARIN SODIUM 5000 UNIT(S): 1000 INJECTION INTRAVENOUS; SUBCUTANEOUS at 21:19

## 2025-02-26 RX ADMIN — Medication 1 MILLIGRAM(S): at 18:11

## 2025-02-26 RX ADMIN — HEPARIN SODIUM 5000 UNIT(S): 1000 INJECTION INTRAVENOUS; SUBCUTANEOUS at 15:06

## 2025-02-26 RX ADMIN — IPRATROPIUM BROMIDE AND ALBUTEROL SULFATE 3 MILLILITER(S): .5; 2.5 SOLUTION RESPIRATORY (INHALATION) at 11:35

## 2025-02-26 RX ADMIN — FLUTICASONE PROPIONATE 1 SPRAY(S): 50 SPRAY, METERED NASAL at 18:12

## 2025-02-26 RX ADMIN — FLUTICASONE PROPIONATE 1 SPRAY(S): 50 SPRAY, METERED NASAL at 05:23

## 2025-02-26 RX ADMIN — Medication 200 MILLIGRAM(S): at 19:55

## 2025-02-26 RX ADMIN — IPRATROPIUM BROMIDE AND ALBUTEROL SULFATE 3 MILLILITER(S): .5; 2.5 SOLUTION RESPIRATORY (INHALATION) at 05:24

## 2025-02-26 RX ADMIN — HEPARIN SODIUM 5000 UNIT(S): 1000 INJECTION INTRAVENOUS; SUBCUTANEOUS at 05:24

## 2025-02-26 RX ADMIN — IPRATROPIUM BROMIDE AND ALBUTEROL SULFATE 3 MILLILITER(S): .5; 2.5 SOLUTION RESPIRATORY (INHALATION) at 14:52

## 2025-02-26 RX ADMIN — Medication 40 MILLIGRAM(S): at 18:14

## 2025-02-26 RX ADMIN — ERYTHROMYCIN 1 APPLICATION(S): 5 OINTMENT OPHTHALMIC at 21:19

## 2025-02-26 RX ADMIN — LEVETIRACETAM 1500 MILLIGRAM(S): 10 INJECTION, SOLUTION INTRAVENOUS at 18:11

## 2025-02-26 RX ADMIN — Medication 1 DROP(S): at 21:20

## 2025-02-26 RX ADMIN — IPRATROPIUM BROMIDE AND ALBUTEROL SULFATE 3 MILLILITER(S): .5; 2.5 SOLUTION RESPIRATORY (INHALATION) at 21:19

## 2025-02-26 RX ADMIN — IPRATROPIUM BROMIDE AND ALBUTEROL SULFATE 3 MILLILITER(S): .5; 2.5 SOLUTION RESPIRATORY (INHALATION) at 18:12

## 2025-02-26 RX ADMIN — Medication 1 APPLICATION(S): at 21:20

## 2025-02-26 RX ADMIN — Medication 1 DROP(S): at 11:06

## 2025-02-26 RX ADMIN — ERYTHROMYCIN 1 APPLICATION(S): 5 OINTMENT OPHTHALMIC at 01:27

## 2025-02-26 RX ADMIN — ERYTHROMYCIN 1 APPLICATION(S): 5 OINTMENT OPHTHALMIC at 05:23

## 2025-02-26 RX ADMIN — Medication 1 DROP(S): at 15:02

## 2025-02-26 RX ADMIN — ERYTHROMYCIN 1 APPLICATION(S): 5 OINTMENT OPHTHALMIC at 15:02

## 2025-02-26 RX ADMIN — Medication 1 DROP(S): at 05:23

## 2025-02-26 RX ADMIN — Medication 1 APPLICATION(S): at 01:28

## 2025-02-26 RX ADMIN — Medication 15 MILLILITER(S): at 18:11

## 2025-02-26 RX ADMIN — Medication 1 DROP(S): at 18:11

## 2025-02-26 RX ADMIN — Medication 1 DROP(S): at 01:28

## 2025-02-26 RX ADMIN — LEVETIRACETAM 1500 MILLIGRAM(S): 10 INJECTION, SOLUTION INTRAVENOUS at 05:24

## 2025-02-26 RX ADMIN — Medication 1 APPLICATION(S): at 11:06

## 2025-02-26 RX ADMIN — ERYTHROMYCIN 1 APPLICATION(S): 5 OINTMENT OPHTHALMIC at 11:06

## 2025-02-26 RX ADMIN — Medication 1 APPLICATION(S): at 05:23

## 2025-02-26 RX ADMIN — Medication 1 SPRAY(S): at 18:13

## 2025-02-26 RX ADMIN — Medication 150 MILLIGRAM(S): at 03:09

## 2025-02-26 RX ADMIN — ERYTHROMYCIN 1 APPLICATION(S): 5 OINTMENT OPHTHALMIC at 18:11

## 2025-02-26 RX ADMIN — Medication 1 APPLICATION(S): at 18:12

## 2025-02-26 NOTE — PROGRESS NOTE ADULT - ASSESSMENT
44M with unclear PMH (?stroke, MI) who was found down at work, intubated for airway protection and found to have acute parenchymal hemorrhage within nabil with mass effect (+ acute/subacute right cerebellar infarct) in setting of hypertensive emergency, transferred to [facility] for further neurosurgical care. Hospital course c/b poor neurologic recovery s/p trach-PEG, AUR s/p gabriel, ANIKA on CKD c/b hyperkalemia, HAP s/p amp-sulbactam (EOT 10/23), K aerogenes bacteremia  treated with 2 weeks of Cefepime per ID , On 11/15 he became septic and was transferred to NSICU due to increased o2 requirements and needed Vent support , Trach Cultures + for Stenotrophomonas which was treated with 7 days of Bactrim per ID , has been weaned of Vent since 11/23 and is now on 10lts at 40% o2 through Trach Collar. Stepped up to the NSICU on 12/15 for hypoxia and tachycardia. Pt s/p  abx for 5 days. Pt now stepped down to 8Lach.    # Pontine hemorrhage  # Encephalopathy due to Intracranial Bleed   - Acute parenchymal hemorrhage within nabil with mass effect (+ acute/subacute right cerebellar infarct) in setting of hypertensive emergency.   - MRI brain also demonstrated acute/subacute R cerebellar stroke.   - No Neurosurgical Interventions were performed   - Trach, Vent and PEG Dependent    # Seizures  - Pt with episode of facial and leg twitching on 2/16 which resolved after the pt was given ativan 4mg x1. Off vEEG since 2/18   - Continue Seizure precautions   - Continue Keppra and phenytoin    # Hypertension  - Continue amlodipine 5mg daily with holding parameters ( SBP < 110 )   - if Amlodipine being consistently held , consider decreasing dose of Doxazosin     # Acute on Chronic respiratory failure   - s/p percutaneous trach by pulm on 10/14/24 - Trach Exchanged by Pulmonary on 2/14/25   - Pt's BAL on 2/13 growing Stenotrophomonas and Enterobacter Cloacae complex  - Continue vent support and chest PT  - F/u ID recommendations - Bactrim and Minocycline 2/17  switched to Ertapenem on 2/19 - End date 2/23 per ID   - Given numerous infectious foci, prolonged hospitalization, recurrent infections and need for broad spectrum antibiotics coverage, without the potential of further clinical recovery, would discuss consideration of no escalation of antibiotics past this current regimen.    # Neurogenic dysphagia.   - Pt s/p PEG placement by  surgery 10/21/24  - Continue TF per nutrition  - Continue aspiration precautions and elevation of HOB.    # Urinary retention  # BPH   - doxazosin 8mg qHS   - Chronic Gabriel     # Functional quadriplegia in setting of brainstem hemorrhage  - decub precautions  - care per nursing protocol     # ANIKA on CKD stage III   - Baseline Creatinine 1.1  - Resolved with IV Fluids   - Increase Free water to 200q6h    # Anemia of Chronic Disease     # DVT prop  -Heparin SQ TID    # Dispo : Awaiting Transfer to [facility]

## 2025-02-26 NOTE — PROGRESS NOTE ADULT - TIME BILLING
Bedside exam and interview    Review of hospital course, labs, vitals, imaging ,  medical records.   Discharge planning on Interdisciplinary rounds   Discussion with Primary team   Documenting the encounter.

## 2025-02-26 NOTE — PROGRESS NOTE ADULT - SUBJECTIVE AND OBJECTIVE BOX
HPI:  Unknown age male, unknown past medical history (reported stroke and MI by coworkers) presented to [facility] with AMS, Pt was working at [employer] when he was found down by coworkers. EMS called and pt brought to [facility] ED. Intubated, sedated, started on cardene for SBPs in 200s. CT head showed brain stem bleed. Transferred to NSICU for further management.  (30 Sep 2024 12:55)    OVERNIGHT EVENTS: GEORGE overnight, neuro stable.     Hospital Course:   9/30: transferred from [facility]. A line placed. Versed dc'd. Cy Rader at bedside, states pt has family in [place], cannot confirm medications or PMH other than stroke and MI. 250cc bolus 3% given. LR switched to NS. hydralazine 25q8 started, 3% started, switched propofol to precedex   10/1: stability CTH done. Added labetalol, started TF. Palliative consulted. ethics consulted to determine surrogate. febrile 103, pan cx sent  10/2: BD 2, GEORGE overnight. TF resumed. Desatt'd to 80s, FiO2 inc. to 50. Fentanyl given, ABG, CXR ordered. Maxxed on precedex, started on propofol for DARIEN -4 - -5. Precedex dc'd. Duonebs, mucomyst, hypertonic added. 3% dc'd. Cardene dc'd. Start vanc/CTX. Increased labetalol 200q8. MRSA negative, dc'd vanc. ETT pulled back 2cm x 2, good positioning after confirmatory chest xray. Ethics attempting to establish HCP with family. Na 159, starting FW 250q6 for range 150-155.   10/3: BD3, GEORGE o/n, neuro stable. Na elevating, FW increased to 300q6. Dc'd bowel reg for diarrhea. vEEG started. SQH 5000q8 tonight.   10/4: BD 4, albumn bolus, incr. LR to 80 2/2 incr. in Cr, LR to 10 0cc/hr for uptrending Cr. Started 7% hypersal for 48hrs and SL atropine for inline/oral thick secretions. Dc'd CTX and started ancef for MSSA in the sputum. Nephrology consulted for CKD, f/u recs. SBP 170s, given hydralazine 10mg IVP.   10/5: BD5, o/n 10mg IVP hydralazine given for SBP 170s and started on hydralazine 25q8 via OGT. 10mg IV push labetalol for SBP > 160s. RT placed for diarrhea.   10/6: BD6, o/n FW increased to 350q4 per nephrology recs. IV tylenol for temp 100.6, SBp 160s presumed uncomfortable.   10/7: BD7, overnight pancultured for temp 101.8F.   10/8: BD8. GEORGE. Cr bumped. decreased LR to 75cc/hr. Adding simethicone ATC. incr hydralazine 50mgTID. Incr labetalol 300mgTID. Na 145, decreased FWF to 250q6. Start precedex. FENa consistent with intrinsic kidney injury. Pend repeat renal US. Retaining up to 1.3L, bladder scans q6, straight cath PRN  10/9: BD 9. GEORGE overnight. Neuro stable. abd xray for distention w non-specific gas pattern, OGT to LIWS for morning. duonebs/mucomyst to q8 for improving secretions. Changed tube feeds to Jevity 1.5 20cc/hr, low rate due to abdominal distention, nepro dense and more difficult to digest. Tolerating CPAP, confirmed by ABG.   10/10: BD 10. GEORGE overnight. Neuro stable. (+) gabriel for urinary retention on bladder scan. inc TF to goal rate of 40cc/hr. family leaning toward pursuing trach/PEG. 1/2 amp for FS 81.   10/11: BD 11. GEORGE overnight. Neuro stable. Trach/PEG consults placed.   10/12: BD 12. GEORGE overnight. Neuro stable. MRI brain complete.   10/13: BD 13. Increase flomax. Hold SQH after PM dose for trach tm. IVL.   10/14: BD 14. GEORGE overnight, remains on AC/VC. Gabriel placed for urinary retention. Dc'd free water.  S/p trach with pulm. NGT placed and CXR confirmed in good position.   10/15: BD 15, GEORGE ovn. resumed feeds. spiked 101, pan cx sent.   10/16: BD 16. GEORGE ovn. Lokelma 5mg for K+ 5.4. Started vanc q 24/zosyn for empiric PNA coverage, IVF to 100/hr. PEG held for fever.   10/17: BD 17,  ordered serum osm and urine osm for am. Started sinemet for neurostimulation. Increased cardura to 0.8. Started FW 100q4, dc'd IVF. MRSA negative, dc'd vanc. NGT replaced d/t coiling.   10/18: BD 18, GEORGE overnight, neuro stable. Amantadine added for neurostim. zosyn changed to unasyn for acinetobacter baumannii, failed TOV and required SC  10/19: BD 19, GEORGE ovn. cardura 2mg added for retention. labetalol decreased 200q8, hydralazine decreased 25q8. Gabriel replaced.   10/20: BD20, GEORGE overnight. NGT dislodged, replaced. PEG tomorrow w/ gen surg, FW increased to 150q4 and labetalol decreased to 100q8, lokelma given for hyperkalemia.   10/21: BD 21. POD0 PEG placement with Gen surg. decr labetolol to 50q8, incr. cardura to 0.4, started lokelma and phoslo, dc gabriel POD0 PEG placement with Gen surg.  10/22: BD 22. Plan to start TF today via PEG. dc labetalol, Following ophtho recs. Increased apnea settings - found to be in cheyne-moe respiration. CPAP 5/5.  10/23: BD 23. hydralazine d/c'd, trach collar trial today. Rectal tube placed at 6am.  10/24: BD24, o/n lokelma held due to diarrhea. Free water 100q6 resumed. dc'd tamsulosin, amantadine. Incr'd cardura to 8mg qhs. Dc'd FW. Switched jevity to nepro. gabriel placed for high urine output. Started SL atropine for oral secretions. Dc'd free water.  10/25: BD25, o/n decreased suctioning requirements to > q4hrs, GEORGE. Cr improving, cont phoslo, lokelma held at this time. Gabriel placed yest, cont. Tolerating trach collar. Given 500cc plasmalyte bolus for ANIKA. Dc'd sinemet.   10/26: BD26, o/n resumed lokelma 5mg daily and resumed 100cc free water q6hrs. Change in neuro status with new right pupillary dilation with anisocoria (right pupil 6mm fixed and left pupil 3mm briskly reactive). Given 23.4% NaCl bullet, taken for emergent CTH showing mostly resolved pontine hemorrhage, continued brainstem hypodensity likely edema d/t hemorrhage, no new hemorrhage or infarct, no herniation, mild increase in size of left lateral ventricle. Vitals remaine stable. Na goal > 140.   10/27: BD27, o/n GEORGE.Neuro stable. Pend stepdown with airway bed.   10/28: BD 28. GEORGE overnight. Neuro stable. Miralax ordered. Gabriel removed, pending TOV.  10/29: BD 29. GEORGE o/n. Given 2L NS over 8 hrs for increased BUN/Cr ratio. Gabriel placed for frequent straight cath.   10/30: BD 30.   10/31: BD 31. GEORGE overnight. Na 149, increased free water to 200q6. 1L NS for uptrending BUN.   11/1: BD 32. GEORGE overnight. Given 1L NS for dehydration. Na 146, increased FW to 250q6.   11/2: BD 33 GEOREG overnight, neuro stable, given 500cc bolus for net negative status and tachycardia   11/3: BD 34, GEORGE overnight, neuro stable. Patient remains tachycardic, EKG showing sinus tachycardia, given additional 500cc NS bolus. Febrile to 101.9F, pan cultured (without UA), CXR WNL, given tylenol.   11/4: BD 35, GEORGE overnight, neuro stable. Given 1L NS for tachycardia. sputum (+) for stenotropohomas maltophilia.   11/5: BD 36 GEORGE overnight, neuro stable. Vancomycin dc'd. Chest PT BID. ID consulted, cont zosyn.  11/6: BD 37. blood cx + klebsiella dc zosyn changed to cefepime, CTAP ordered, rpt blood cx sent.    11/7: BD 38. Pending CT A/P, given 250cc bolus and starting maintenance fluids overnight. Pending CT A/P after bolus   11/8: BD 39. CT CAP negative for infection.   11/9: BD 40. GEORGE overnight.  11/10: BD 41. GEORGE overnight. desat to 85 on trach collar, O2 inc to 10L and 100%, O2 sat inc to 95. pt tachy to 110s, euvolemic. given tylenol. ABG and CXR ordered. spiked fever, pancultured, RVP negative. AM ABG w pO2 79, rpt w pO2 79. pt appears comfortable, satting 94%.   11/11: BD 42. GEORGE overnight. pt became tachy to 130s, desat to 90 on 100% FiO2 and 10L. suctioned, (+) productive cough. temp 101.4, given 1g IV tylenol and 500cc NS bolus for euvolemia. fever and HR downtrending. LE dopplers negative for dvt  11/12: BD 43, GEORGE ovn, fever and HR downtrending, satting 97% 70% FIO2  11/13: BD 44, GEORGE ovn. started standing tylenol x24 hours for tachycardia. desat to 80s, o2 increased. CXR stable, pending CTA PE protocol.   11/14: BD 45, GEORGE overnight, neuro stable. resp therapy dec FiO2 to 70%.   11/15: BD 46, GEORGE overnight, neuro stable.  Rapid called for desaturation 30s, tachycardic 140s. Patient bagged, 100% fio2, heavily suctioned. CXR/POCUS unremarkable. ABG c/w desaturation. WBC 14.71. Afebrile. O2 improved to 90s and patient upgraded to ICU. ABG paO2 30s improved to 89 on vent. IV Tylenol x 1, sputum sent. Start protonix while o-n vent.   11/16: BD 47. POCUS showed collapsable IVCF, given 1L bolus. Vanco/Cefpime added empriic for PNA, NGT feeds restarted. MRSA swab neg, Vanc DC'd.   11/17: BD 48. GEORGE overnight. 1l bolus for tachycardia. Spiked to 101, cultured. 500cc bolus for tachycardia, tachy to 148 given 25mcg fentanyl, 250cc albumin, 1.5L bolus. 5 IV lopressor with response HR to 100s. +Stenotrophomonas on sputum cx.   11/18: BD 49. GEORGE overnight. Consulted ID, cefepime switched to bactrim x7days. Started hydrochlorothiazine 12.5mg daily.  11/19: BD 50. Tachy 120s, given tylenol and 500cc NS. Tolerating 5/5, switched to TCx. Holding phos binder. D/c Bactrim. D/c gabriel, f/u TOV. Dc'd PPI.   11/20: BD 51. GEORGE ovn. 1600 satting low 90s, mildly tachy to 110s, afebrile, RR wnl. O2 improved to mid 90s while inc O2 to 100% on TCx. CPAP 5/5 placed back on.  11/21: BD 52, GEORGE ovn, tolerating CPAP 5/5. Switch to trach collar during the day if tolerating well. HCTZ held for Cr bump, straight cath frequence increased to q4  11/22: BD 53, GEORGE ovn. Resumed phoslo. Gabriel placed. Resumed HCTZ.   11/23: BD 54. Holding tylenol in setting of possible fever, will require pan cx if febrile. Cr improved today. Cont CPAP. Bowel regimen held i/s/o diarrhea. FOBT negative.  11/24: BD 55. GEORGE overnight. Neuro stable. HCTZ dc'ed, started lisinopril 5. Lokelma dc'ed for K 3.7.   11/25: BD 56, GEORGE overnight. Neuro stable. dc'd lisinopril 5mg. Gabriel dc'd. TOV. 1545 noted to be hypotensive, MAP 50, in supine position on chair, HR 60s, afebrile, O2 96%. Given 1L cc NS bolus, placed back on bed in reverse trendelenberg, improved to map of 66. Neostick at bedside. Vitals check q1h. Dc'ed amlodipine. Failed TOV, bladder scan q6, sc prn. Added back Senna.   11/26: BD 57, GEORGE overnight. Neuro stable. Dc'd phoslo.   11/27: BD 58, GEORGE overnight. Neuro stable.    11/28: BD 59. Gabriel replaced.   11/29: GEORGE.  11/30: GEORGE, neuro stable.   12/1: BD 62, GEORGE overnight  12/2: BD 63, GEORGE overnight.; Given 1L bolus of LR for uptrending BUN/Cr.  12/3: BD 64. Reinstated eye gtt/moisture chamber given increased Rt eye injection  12/4: BD 65. GEORGE overnight. Attempted to speak with ophtho regarding eyelid weight/closure but no answer, full mailbox.   12/5: BD 66, GEORGE overnight, bowel regimen increased and had BM.   12/6: BD 67, GEORGE overnight, neuro stable.  12/7: GEORGE overnight, neuro stable. /110s, given x1 hydralazine 10 mg IVP. Restarted home amlodipine 5mg.  12/8: GEORGE. OOB to chair.     12/11: GEORGE, mucomyst added for thick secretions, simethicone for abd distension, abd xray with stool burden, increased bowel regimen.   12/12: GEORGE overnight.   12/13: GEORGE overnight.   12/14: GEORGE overnight  12/15: o/n Patient became tachycardic HR 120s and 10 minutes later O2 sat dropped as low as 89%. Patient suctioned without improvement in O2 sat and tube feeds found in suction catheter. TFs held.  STAT CXR ordered. STAT labs sent. Respiratory therapist called to bedside and patient trach connected to ventilator. After connection to ventilator and further suctioning O2 sat improved to 97% but patient HR remains 120-130s. Upgraded to NSICU for further management. Vancomycin and zosyn started. CTA  chest PE protocol and CTH ordered. Blood cultures sent.given 500cc bolus, rpt ABG sent pO2 243, CTH and CTA chest done. FS while NPO, FiO2 dec 50 pending ABG. sputum cx positive for few GPC and GNR.   12/16: GEORGE overnight, restarted amlodipine, troponin 75   12/17: GEORGE overnight, neuro stable. Attempted CPAP this morning, did not tolerate and back on full vent support. Dc'd Vanc. Tachycardia to 140s, noted extremities to be twitching along with jaw twitching. Given 2g of Keppra, total 4mg ativan and placed on EEG, full set of labs and lactate negative. Resumed trickle feeds at 20cc/hr. Given 250cc albumin for tachycardia.   12/18: GEORGE overnight. Neuro stable. CTH stable. EEG negative and dc'd.   12/19: GEORGE overnight. neuro stable. SIMV most of day, AC/VC at night.   12/20: GEORGE overnight, neuro stable. remains on VC/AC  12/21: GEORGE ovn. 1L bolus for tachy to 120s-130s.   12/22: GEORGE ovn. Tachy to 120s, given tylenol and IVF. 2mg IV ativan given for L jaw twitching. Sx resolved for 3 minutes and started again. Epilepsy contacted. Given 2mg IV ativan. Continued twitching. Increased keppra to 1500mg BID, 2mg ativan given. Propranolol started for refractory tachycardia. vEEG ordered. albumin given. POCUS performed, no b lines. Started on fosphenytoin, loaded w 20mg/kg then 100mg TID. febrile, pancx, started cefepime 2g q8, vancomycin  12/23: GEORGE ovn. +focal motor seizures on eeg. ID consulted. Vancomycin dc'ed as per ID.  12/24: GEORGE ovn, neuro stable. Baclofen 5 mg q12 started for hiccups. Na 129 from 134. Urine lytes c/w SIADH. Given 250cc 3% bolus. CT chest for infection w/u - f/u read. Repeat Na 136. UA negative  12/25: GEORGE ovn.   12/26: GEORGE ovn  12/27: GEORGE overnight. Blood cx neg @ 4 days, DC cefepime per medicine (sputum colonized).   12/28: Desaturation o/n to 80's, CXR obtained, pulse ox changed and sats resolved. Na 133 from 138, 250cc 3% bolus given. Cefepime resumed until 12/30 per ID. Rpt Na 138.  12/29: GEORGE overnight. Na stable.   12/30: GEORGE overnight. Family meeting with son, pt now DNR.   12/31: GEORGE overnight.   1/1: GEORGE overnight, neuro stable.  1/2: GEORGE overnight, neuro stable. FW decreased to 100q6.   1/3: GEORGE overnight, neuro stable. fosphenytoin IV changed to pheytoin PO via PEG per epilepsy recommendations  1/4: GEORGE overnight, neuro stable. FW incr. to 100q4  1/5: GEORGE overnight, neuro stable.   1/6: GEORGE overnight, neuro stable   1/7: GEORGE overnight, neuro stable. BUN/Cr increased, increased FW to 200q6. Given 1L bolus of LR over 2 hours. Gabriel d/c'd, voiding, continue bladder scan q6.   1/8: GEORGE overnight, neuro stable. BUN/Cr improving.  1/9: GEORGE overnight, neuro stable.   1/10: GEORGE overnight, neuro stable.   1/11: GEORGE overnight, neuro stable, vent settings stable.   1/12: GEORGE overnight, neuro stable. Febrile to 102F, f/u pan cx, chest xray, given tylenol. Zosyn started.   1/13: GEORGE overnight, neuro stable, cont Zosyn for presumed PNA, prelim sputum cx growing; few GS neg diplococci, mod GS neg rods, rare GS + rods, fever trend improved. Free water increased to 220lmd6.   1/14: GEORGE overnight. pending renal US for elevated Cr  1/15: GEORGE ovn. renal US complete.   1/16: GEORGE overnight, neuro stable   1/17: GEORGE overnight, neuro stable   1/18: GEORGE overnight, neuro stable.   1/19: GEORGE overnight, neuro stable. Propranolol dec to 5q8.   1/20: GEORGE overnight, neuro stable. Weaned propranolol to 5mg BID.   1/21: GEORGE ovn, in AM having rapid vertical eye movements with facial twitching, 2g total keppra given for AM dose and phenytoin level ordered, vital signs stable. CTH stable. Phenytoin increased to 100/100/150mg per epilepsy  1/22: GEORGE ovn. IV hydral x 1 for SBP 170s.   1/23: Cont to have focal motor seizures. Per epilepsy, changed phenytoin dose to 100/100/200.   1/24: Phenytoin lvl tmrw AM. Chest Xray completed due to temp 100.2F  1/25: GEORGE overnight. Incr dilantin morning and afternoon per epilepsy.   1/26: GEORGE overnight. Sustained focal twitching noticed by nursing. Ativan 8mg in total given. Fosphenytoin 1500mg IV given. Placed on EEG. Epilepsy following. TFs held. Phenytoin increased to 150/150/200.   1/27: o/n CTH completed due to continued lethargy after ativan given yesterday afternoon. TFs resumed. 500cc bolus NS given for SBP 90s. EEG dc'ed, discussed w/ Dr. Tomlin. Febrile 101F, pan cx sent. Likely left sided infiltrate on CXR, c/f aspiration PNA. Started on vanc/zosyn. MRSA swab pending.   1/28: Neuro stable, on vanc/zosyn. +UTI on UA, MRSA negative. Vanc dc'd. RVP negative. Zosyn increased to pseudomonal dosing.   1/29: GEORGE overnight, neuro stable.  1/30: GEORGE overnight, neuro stable. Dc'd zosyn due to resistance, switched to Levaquin.  1/31: GEORGE ovenight, neuro stable.   2/1: Brief desat. Improved with neb and reposititioning. ABG and POCUS wnl.   2/4: GEORGE overnight  2/5: GEORGE overnight  2/6: GEORGE ovn  2/7: GEORGE ovn. L eye redness noted, started erythromycin and lacrilube to L eye as well. LR @ 100 cc/hr x 10 hours for uptrending BUN/Cr. Resumed bowel reg.   2/8: GEORGE overnight. Escalated bowel regimen.   2/9: GEORGE overnight.  2/10: GEORGE overnight. Social work to start working on SNF placement. Pending appointment with [employer] to come to hospital for biometrics.  2/11: GEORGE overnight. Neuro stable. Potassium improved (downtrending).   2/12: GEORGE overnight. Neuro stable. Lokelma 5mg x 1. Decrease FW to 200q8. 1L NS bolus given for hydration, uptrending BUN/Cr. Wound care rec barrier wipes for penile wound.  TFs changed to [employer] renal formula per nutrition.  2/13: GEORGE overnight. Neuro stable. Corneal abrasion noted on exam, ophtho re-consulted for L eye, eye drops changed per recs to q4. Pulm contacted for trach exchange, attempted at bedside but patient unable to tolerate with minimal sedation, will plan for tomorrow.   2/14: GEORGE overnight, given 1L of NS for low BP after fentanyl with improvement. Neuro exam stable, pend trach exchange today with pulm. Pend optho assessment for left eye. Trache exchanged. Desaturation during placement to 90%. FiO2 incr'd 55%. CXR negative for pneumothorax.   2/15: GEORGE overnight. Dest'd 88%: suctione, incr'd FiO2, PEEP. CXR/ABG/POCUS done with B-lines, 20mg IV lasix given.   2/16: GEORGE ovn, BAL from 2/13 during trach exchange growing moderate stenotrophomonas maltophilia, desat to 87% red rubber suctioning performed with improvements in O2 sat to 98%, Desturated again to 76%. Deep suctioned, ABG/CXR and lasix, incr. vent settings, Saturating 95%. Pulm rec'd dc mucomyst. Increased clonus while having another episode of desaturation, given 4mg ativan, clonus broke. O2 sats normalized with deep suction. Back on vEEG per gen neuro. CT chest and sputum cx obtained per ID.   2/17: GEORGE ovn. Blood cultures drawn. ID rec'd treatment for stenotrophomonas. EEG with concern for singular seizure activity. Keppra and Phenytoin levels were drawn. 2x blood cultures sent prior to starting DS Bactrim and Minocycline PO as per ID recs. Additional standing ativan added per epilepsy.   2/18: GEORGE ovn. Per epilepsy ativan decreased to 1mg q12. EEG dc'd.  2/19: GEORGE ovn. 500cc bolus for Cr bump over 5 hrs. Dc'd bactrim and minocycline. Start ertapanem as per ID.   2/20: GEORGE overnight, neuro stable. Cr bump likely 2/2 Bactrim, will monitor. TF adjusted off minocycline. DC propranolol and baclofen. Sent photo of L eye to ophtho, increased drops to q4.  2/21: GEORGE overnight, neuro stable. Keep Ertapenem per Dr. Velasco. FENa consistent with pre-renal ANIKA, 1L LR administered 125cc/hr.  2/22: GEORGE overnight, bowel regimen held due to loose stool.   2/23: o/n patient with desat to 80s, improved with suctioning and patient coughing up sputum.  2/24: GEORGE overnight.  2/25: GEORGE overnight. FW increased to 200cc q6.  2/26: GEORGE overnight.   Vital Signs Last 24 Hrs  T(C): 37.6 (25 Feb 2025 21:50), Max: 37.6 (25 Feb 2025 21:50)  T(F): 99.6 (25 Feb 2025 21:50), Max: 99.6 (25 Feb 2025 21:50)  HR: 84 (25 Feb 2025 23:50) (72 - 98)  BP: 110/70 (25 Feb 2025 23:50) (102/67 - 126/87)  BP(mean): 85 (25 Feb 2025 23:50) (80 - 102)  RR: 16 (25 Feb 2025 23:50) (14 - 18)  SpO2: 97% (25 Feb 2025 23:50) (94% - 98%)    Parameters below as of 25 Feb 2025 23:50  Patient On (Oxygen Delivery Method): ventilator    O2 Concentration (%): 50    I&O's Summary    24 Feb 2025 07:01  -  25 Feb 2025 07:00  --------------------------------------------------------  IN: 1681 mL / OUT: 675 mL / NET: 1006 mL    25 Feb 2025 07:01  -  26 Feb 2025 00:28  --------------------------------------------------------  IN: 1485 mL / OUT: 380 mL / NET: 1105 mL      PHYSICAL EXAM:  Constitutional: NAD, lying in bed.  HEENT: Pupils equal, round, reactive to light.   Respiratory: +Trach to mechanical vent. No respiratory distress, symmetric chest rise  Cardiovascular: Regular rate and rhythm    Gastrointestinal: +PEG, Abdomen soft, nondistended.  Neurological:  AAOX0-1. Opens eyes. Tracks vertically  Motor: RUE squeezes hand to comman, LUE 0/5, B/l LE w/d.   Sensation: unable to assess  Extremities: Warm, well perfused.    TUBES/LINES:  [] Gabriel  [] Lumbar Drain  [] Wound Drains  [] Others      DIET:  [] NPO  [] Mechanical  [x] Tube feeds        LABS:                        10.1   5.97  )-----------( 194      ( 25 Feb 2025 05:30 )             30.7     02-25    137  |  98  |  50[H]  ----------------------------<  110[H]  4.3   |  26  |  1.25    Ca    9.2      25 Feb 2025 05:30  Phos  4.1     02-25  Mg     2.2     02-25        Urinalysis Basic - ( 25 Feb 2025 05:30 )    Color: x / Appearance: x / SG: x / pH: x  Gluc: 110 mg/dL / Ketone: x  / Bili: x / Urobili: x   Blood: x / Protein: x / Nitrite: x   Leuk Esterase: x / RBC: x / WBC x   Sq Epi: x / Non Sq Epi: x / Bacteria: x          CAPILLARY BLOOD GLUCOSE          Drug Levels: [] N/A    CSF Analysis: [] N/A      Allergies    Allergy Status Unknown    Intolerances      MEDICATIONS:  Antibiotics:    Neuro:  acetaminophen   Oral Liquid .. 650 milliGRAM(s) Oral every 6 hours PRN  levETIRAcetam 1500 milliGRAM(s) Oral every 12 hours  LORazepam     Tablet 1 milliGRAM(s) Oral every 12 hours  LORazepam   Injectable 2 milliGRAM(s) IV Push once PRN  phenytoin   Suspension 150 milliGRAM(s) Oral <User Schedule>  phenytoin   Suspension 200 milliGRAM(s) Oral <User Schedule>    Anticoagulation:  heparin   Injectable 5000 Unit(s) SubCutaneous every 8 hours    OTHER:  albuterol/ipratropium for Nebulization 3 milliLiter(s) Nebulizer every 4 hours  amLODIPine   Tablet 5 milliGRAM(s) Oral daily  artificial tears (preservative free) Ophthalmic Solution 1 Drop(s) Both EYES every 4 hours  chlorhexidine 0.12% Liquid 15 milliLiter(s) Oral Mucosa every 12 hours  doxazosin 8 milliGRAM(s) Oral at bedtime  erythromycin   Ointment 1 Application(s) Both EYES every 4 hours  fluticasone propionate 50 MICROgram(s)/spray Nasal Spray 1 Spray(s) Both Nostrils two times a day  influenza   Vaccine 0.5 milliLiter(s) IntraMuscular once  pantoprazole   Suspension 40 milliGRAM(s) Oral daily  petrolatum Ophthalmic Ointment 1 Application(s) Both EYES every 4 hours  sodium chloride 0.65% Nasal 1 Spray(s) Both Nostrils two times a day    IVF:    CULTURES:  Culture Results:   No growth at 5 days (02-17 @ 12:10)  Culture Results:   No growth at 5 days (02-17 @ 12:10)    RADIOLOGY & ADDITIONAL TESTS:      ASSESSMENT:  44M PMH ?stroke/MI present to [facility] after collapsing at work. Decorticate posturing, vomiting, intubated for airway protection. 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 Transferred to [facility] for further management. s/p trach 10/14. s/p peg 10/21. Re-upgrade to ICU 2/2 desaturation event and suctioning requirements 11/15. Re-upgrade to NSICU 12/15 2/2 desaturation and tachycardia.            PLAN:    Neuro:  - neuro/vitals q4h  - pain control: tylenol prn  - seizure tx: keppra 1500mg BID, phenytoin 150/150/200, ativan 1mg q12  - s/p vEEG: +seizure on 2/17 (o/w neg x 5)  - CTH 9/30: enlarged pontine hemorrhage, CTH 10/3: stable, CTH 10/25: mostly resolved pontine hemorrhage, CTH 12/15: R mastoid air cell opacification; acute otitis media vs sterile effusion, CTH 12/18: stable. CTH 1/21 stable, CTH 1/27 stable  - MRI brain 10/12: parenchymal hemorrhage, acute/subacute R cerebellar stroke      CV:  - -160  - tachycardia resolved, s/p propranolol  - HTN: amlodipine 5mg  - echo (9/30) EF 75%, repeat 12/16: 57%    PULM:  - s/p trach exchange with pulm at bedside 2/14 to vent, AC/VC 40/400/14/6  - Secretions: duonebs/chest PT q4h  - CT chest 2/17: Near complete collapse b/l lower lobes. Patchy opacity within LLL/ALONDRA  - pulmonology consulted, recs appreciated    GI:  - TFs via PEG, [facility]  - bowel regimen held due to loose stool, last BM 2/23    Renal:  - IVL  - FW 200q6 for hydration  - Voiding, SC prn  - CKD: trend BUN/Cr  - renal US 10/1, 10/8, 1/15: Increased bilateral renal echogenicity consistent with medical renal disease    Endo:  - A1c 5.4    Heme:  - DVT ppx: SCDs, SQH 5000u q8h   - LE dopplers negative 12/16    ID:  - sputum 2/16 +Enterobacter cloacae: s/p Ertapenem (2/19-23) per ID recs, likely conolonized with stenotrophomonas  - +klebsiella UTI s/p levaquin (1/30-2/3)  - empiric PNA: cefepime (12/22-12/30), s/p vanc (12/22-12/23), s/p zosyn (12/15-12/20), s/p vanc (12/15-12/16), s/p zosyn (1/12 -1/18), s/p DS bactrim 2 tabs TID, 200 mg minocycline BID (2/18)  - +klebsiella UTI s/p   - s/p Stenotrophomonas maltophilia PNA: s/p Bactrim (11/18-11/19) s/p Cefepime (11/16-11/18)  - 11/3, (+) sputum for stenotrophomas maltophlia, blood cx (+) klebsiella, cefepime 2gq12 (11/6 - 11/12)   - S/p Ancef (10/4-10/14) for PNA, and s/p Unasyn (10/18-10/23) +actinobacter baumanii     MISC:  - BL keratitis: BL erythromycin ointment, artificial tears, lacrilube q4, moisture chamber   - Penile wound: wound care rec barrier wipes     Dispo: SDU status, DNR, pending SNF    D/w Dr. D'Amico

## 2025-02-26 NOTE — PROGRESS NOTE ADULT - SUBJECTIVE AND OBJECTIVE BOX
44y old  Male who presents with a chief complaint of brain stem bleed (23 Feb 2025 00:44)      SUBJECTIVE / OVERNIGHT EVENTS:  No acute events overnight per nursing staff  patient does not talk   Afebrile.  No leukocytosis  Amlodipine held this morning for SBP < 110     MEDICATIONS  (STANDING):  albuterol/ipratropium for Nebulization 3 milliLiter(s) Nebulizer every 4 hours  amLODIPine   Tablet 5 milliGRAM(s) Oral daily  artificial tears (preservative free) Ophthalmic Solution 1 Drop(s) Both EYES every 4 hours  chlorhexidine 0.12% Liquid 15 milliLiter(s) Oral Mucosa every 12 hours  doxazosin 8 milliGRAM(s) Oral at bedtime  erythromycin   Ointment 1 Application(s) Both EYES every 4 hours  fluticasone propionate 50 MICROgram(s)/spray Nasal Spray 1 Spray(s) Both Nostrils two times a day  heparin   Injectable 5000 Unit(s) SubCutaneous every 8 hours  influenza   Vaccine 0.5 milliLiter(s) IntraMuscular once  levETIRAcetam 1500 milliGRAM(s) Oral every 12 hours  LORazepam     Tablet 1 milliGRAM(s) Oral every 12 hours  pantoprazole   Suspension 40 milliGRAM(s) Oral daily  petrolatum Ophthalmic Ointment 1 Application(s) Both EYES every 4 hours  phenytoin   Suspension 150 milliGRAM(s) Oral <User Schedule>  phenytoin   Suspension 200 milliGRAM(s) Oral <User Schedule>  sodium chloride 0.65% Nasal 1 Spray(s) Both Nostrils two times a day    MEDICATIONS  (PRN):  acetaminophen   Oral Liquid .. 650 milliGRAM(s) Oral every 6 hours PRN Temp greater or equal to 38C (100.4F), Mild Pain (1 - 3)  LORazepam   Injectable 2 milliGRAM(s) IV Push once PRN Seizure Activity (NOTIFY PROVIDER PRIOR TO GIVING)        OBJECTIVE:  Vital Signs Last 24 Hrs  T(C): 36.6 (26 Feb 2025 09:04), Max: 37.6 (25 Feb 2025 21:50)  T(F): 97.9 (26 Feb 2025 09:04), Max: 99.6 (25 Feb 2025 21:50)  HR: 69 (26 Feb 2025 09:31) (69 - 88)  BP: 127/81 (26 Feb 2025 08:30) (102/67 - 127/81)  BP(mean): 98 (26 Feb 2025 08:30) (80 - 102)  RR: 14 (26 Feb 2025 09:31) (14 - 18)  SpO2: 95% (26 Feb 2025 09:31) (94% - 100%)    Parameters below as of 26 Feb 2025 09:31  Patient On (Oxygen Delivery Method): ventilator    O2 Concentration (%): 50        PHYSICAL EXAM:    Gen:  awake , in bed    HEENT: trach in place   CV: RRR, +S1/S2  Pulm: adequate respiratory effort, no increase in work of breathing  Abd: soft, ND  Skin: warm and dry,   Neuro: awake, does not talk, opening eyes to voice     LABS:                                              10.1   5.97  )-----------( 194      ( 25 Feb 2025 05:30 )             30.7     02-25    137  |  98  |  50[H]  ----------------------------<  110[H]  4.3   |  26  |  1.25    Ca    9.2      25 Feb 2025 05:30  Phos  4.1     02-25  Mg     2.2     02-25                  RADIOLOGY & ADDITIONAL TESTS:    Imaging Personally Reviewed:    Consultant(s) Notes Reviewed:      Care Discussed with Consultants/Other Providers:

## 2025-02-27 LAB
ANION GAP SERPL CALC-SCNC: 12 MMOL/L — SIGNIFICANT CHANGE UP (ref 5–17)
BUN SERPL-MCNC: 54 MG/DL — HIGH (ref 7–23)
CALCIUM SERPL-MCNC: 9.7 MG/DL — SIGNIFICANT CHANGE UP (ref 8.4–10.5)
CHLORIDE SERPL-SCNC: 100 MMOL/L — SIGNIFICANT CHANGE UP (ref 96–108)
CO2 SERPL-SCNC: 24 MMOL/L — SIGNIFICANT CHANGE UP (ref 22–31)
CREAT SERPL-MCNC: 1.19 MG/DL — SIGNIFICANT CHANGE UP (ref 0.5–1.3)
EGFR: 76 ML/MIN/1.73M2 — SIGNIFICANT CHANGE UP
EGFR: 76 ML/MIN/1.73M2 — SIGNIFICANT CHANGE UP
GLUCOSE SERPL-MCNC: 147 MG/DL — HIGH (ref 70–99)
HCT VFR BLD CALC: 32 % — LOW (ref 39–50)
HGB BLD-MCNC: 10.6 G/DL — LOW (ref 13–17)
MAGNESIUM SERPL-MCNC: 2.2 MG/DL — SIGNIFICANT CHANGE UP (ref 1.6–2.6)
MCHC RBC-ENTMCNC: 31.7 PG — SIGNIFICANT CHANGE UP (ref 27–34)
MCHC RBC-ENTMCNC: 33.1 G/DL — SIGNIFICANT CHANGE UP (ref 32–36)
MCV RBC AUTO: 95.8 FL — SIGNIFICANT CHANGE UP (ref 80–100)
NRBC BLD AUTO-RTO: 0 /100 WBCS — SIGNIFICANT CHANGE UP (ref 0–0)
PHOSPHATE SERPL-MCNC: 4.4 MG/DL — SIGNIFICANT CHANGE UP (ref 2.5–4.5)
PLATELET # BLD AUTO: 210 K/UL — SIGNIFICANT CHANGE UP (ref 150–400)
POTASSIUM SERPL-MCNC: 4.3 MMOL/L — SIGNIFICANT CHANGE UP (ref 3.5–5.3)
POTASSIUM SERPL-SCNC: 4.3 MMOL/L — SIGNIFICANT CHANGE UP (ref 3.5–5.3)
RBC # BLD: 3.34 M/UL — LOW (ref 4.2–5.8)
RBC # FLD: 12.2 % — SIGNIFICANT CHANGE UP (ref 10.3–14.5)
SODIUM SERPL-SCNC: 136 MMOL/L — SIGNIFICANT CHANGE UP (ref 135–145)
WBC # BLD: 6.53 K/UL — SIGNIFICANT CHANGE UP (ref 3.8–10.5)
WBC # FLD AUTO: 6.53 K/UL — SIGNIFICANT CHANGE UP (ref 3.8–10.5)

## 2025-02-27 PROCEDURE — 99232 SBSQ HOSP IP/OBS MODERATE 35: CPT

## 2025-02-27 PROCEDURE — 99231 SBSQ HOSP IP/OBS SF/LOW 25: CPT

## 2025-02-27 RX ADMIN — IPRATROPIUM BROMIDE AND ALBUTEROL SULFATE 3 MILLILITER(S): .5; 2.5 SOLUTION RESPIRATORY (INHALATION) at 11:00

## 2025-02-27 RX ADMIN — Medication 1 MILLIGRAM(S): at 05:34

## 2025-02-27 RX ADMIN — Medication 1 DROP(S): at 11:00

## 2025-02-27 RX ADMIN — Medication 15 MILLILITER(S): at 05:34

## 2025-02-27 RX ADMIN — FLUTICASONE PROPIONATE 1 SPRAY(S): 50 SPRAY, METERED NASAL at 18:49

## 2025-02-27 RX ADMIN — ERYTHROMYCIN 1 APPLICATION(S): 5 OINTMENT OPHTHALMIC at 18:48

## 2025-02-27 RX ADMIN — Medication 40 MILLIGRAM(S): at 13:41

## 2025-02-27 RX ADMIN — Medication 1 DROP(S): at 05:35

## 2025-02-27 RX ADMIN — ERYTHROMYCIN 1 APPLICATION(S): 5 OINTMENT OPHTHALMIC at 11:00

## 2025-02-27 RX ADMIN — IPRATROPIUM BROMIDE AND ALBUTEROL SULFATE 3 MILLILITER(S): .5; 2.5 SOLUTION RESPIRATORY (INHALATION) at 13:41

## 2025-02-27 RX ADMIN — Medication 1 DROP(S): at 18:49

## 2025-02-27 RX ADMIN — Medication 1 SPRAY(S): at 05:35

## 2025-02-27 RX ADMIN — Medication 1 DROP(S): at 23:05

## 2025-02-27 RX ADMIN — Medication 1 APPLICATION(S): at 11:00

## 2025-02-27 RX ADMIN — Medication 1 MILLIGRAM(S): at 18:48

## 2025-02-27 RX ADMIN — Medication 1 APPLICATION(S): at 01:18

## 2025-02-27 RX ADMIN — Medication 1 DROP(S): at 13:41

## 2025-02-27 RX ADMIN — IPRATROPIUM BROMIDE AND ALBUTEROL SULFATE 3 MILLILITER(S): .5; 2.5 SOLUTION RESPIRATORY (INHALATION) at 23:00

## 2025-02-27 RX ADMIN — Medication 200 MILLIGRAM(S): at 19:32

## 2025-02-27 RX ADMIN — Medication 150 MILLIGRAM(S): at 13:41

## 2025-02-27 RX ADMIN — HEPARIN SODIUM 5000 UNIT(S): 1000 INJECTION INTRAVENOUS; SUBCUTANEOUS at 23:00

## 2025-02-27 RX ADMIN — Medication 15 MILLILITER(S): at 18:49

## 2025-02-27 RX ADMIN — DOXAZOSIN MESYLATE 4 MILLIGRAM(S): 8 TABLET ORAL at 23:05

## 2025-02-27 RX ADMIN — Medication 1 APPLICATION(S): at 05:34

## 2025-02-27 RX ADMIN — LEVETIRACETAM 1500 MILLIGRAM(S): 10 INJECTION, SOLUTION INTRAVENOUS at 05:34

## 2025-02-27 RX ADMIN — ERYTHROMYCIN 1 APPLICATION(S): 5 OINTMENT OPHTHALMIC at 13:42

## 2025-02-27 RX ADMIN — IPRATROPIUM BROMIDE AND ALBUTEROL SULFATE 3 MILLILITER(S): .5; 2.5 SOLUTION RESPIRATORY (INHALATION) at 01:18

## 2025-02-27 RX ADMIN — Medication 1 DROP(S): at 01:18

## 2025-02-27 RX ADMIN — Medication 1 SPRAY(S): at 18:49

## 2025-02-27 RX ADMIN — ERYTHROMYCIN 1 APPLICATION(S): 5 OINTMENT OPHTHALMIC at 23:07

## 2025-02-27 RX ADMIN — ERYTHROMYCIN 1 APPLICATION(S): 5 OINTMENT OPHTHALMIC at 05:35

## 2025-02-27 RX ADMIN — IPRATROPIUM BROMIDE AND ALBUTEROL SULFATE 3 MILLILITER(S): .5; 2.5 SOLUTION RESPIRATORY (INHALATION) at 05:34

## 2025-02-27 RX ADMIN — HEPARIN SODIUM 5000 UNIT(S): 1000 INJECTION INTRAVENOUS; SUBCUTANEOUS at 05:34

## 2025-02-27 RX ADMIN — Medication 150 MILLIGRAM(S): at 03:02

## 2025-02-27 RX ADMIN — Medication 1 APPLICATION(S): at 23:07

## 2025-02-27 RX ADMIN — ERYTHROMYCIN 1 APPLICATION(S): 5 OINTMENT OPHTHALMIC at 01:18

## 2025-02-27 RX ADMIN — Medication 1 APPLICATION(S): at 18:49

## 2025-02-27 RX ADMIN — FLUTICASONE PROPIONATE 1 SPRAY(S): 50 SPRAY, METERED NASAL at 05:35

## 2025-02-27 RX ADMIN — Medication 1 APPLICATION(S): at 13:42

## 2025-02-27 RX ADMIN — IPRATROPIUM BROMIDE AND ALBUTEROL SULFATE 3 MILLILITER(S): .5; 2.5 SOLUTION RESPIRATORY (INHALATION) at 18:48

## 2025-02-27 RX ADMIN — HEPARIN SODIUM 5000 UNIT(S): 1000 INJECTION INTRAVENOUS; SUBCUTANEOUS at 13:42

## 2025-02-27 RX ADMIN — LEVETIRACETAM 1500 MILLIGRAM(S): 10 INJECTION, SOLUTION INTRAVENOUS at 18:48

## 2025-02-27 NOTE — PROGRESS NOTE ADULT - SUBJECTIVE AND OBJECTIVE BOX
44y old  Male who presents with a chief complaint of brain stem bleed (23 Feb 2025 00:44)      SUBJECTIVE / OVERNIGHT EVENTS:  No acute events overnight per nursing staff  patient does not talk   Afebrile.  No leukocytosis  Amlodipine held this morning for SBP < 110     MEDICATIONS  (STANDING):  albuterol/ipratropium for Nebulization 3 milliLiter(s) Nebulizer every 4 hours  amLODIPine   Tablet 5 milliGRAM(s) Oral daily  artificial tears (preservative free) Ophthalmic Solution 1 Drop(s) Both EYES every 4 hours  chlorhexidine 0.12% Liquid 15 milliLiter(s) Oral Mucosa every 12 hours  doxazosin 8 milliGRAM(s) Oral at bedtime  erythromycin   Ointment 1 Application(s) Both EYES every 4 hours  fluticasone propionate 50 MICROgram(s)/spray Nasal Spray 1 Spray(s) Both Nostrils two times a day  heparin   Injectable 5000 Unit(s) SubCutaneous every 8 hours  influenza   Vaccine 0.5 milliLiter(s) IntraMuscular once  levETIRAcetam 1500 milliGRAM(s) Oral every 12 hours  LORazepam     Tablet 1 milliGRAM(s) Oral every 12 hours  pantoprazole   Suspension 40 milliGRAM(s) Oral daily  petrolatum Ophthalmic Ointment 1 Application(s) Both EYES every 4 hours  phenytoin   Suspension 150 milliGRAM(s) Oral <User Schedule>  phenytoin   Suspension 200 milliGRAM(s) Oral <User Schedule>  sodium chloride 0.65% Nasal 1 Spray(s) Both Nostrils two times a day    MEDICATIONS  (PRN):  acetaminophen   Oral Liquid .. 650 milliGRAM(s) Oral every 6 hours PRN Temp greater or equal to 38C (100.4F), Mild Pain (1 - 3)  LORazepam   Injectable 2 milliGRAM(s) IV Push once PRN Seizure Activity (NOTIFY PROVIDER PRIOR TO GIVING)        OBJECTIVE:  Vital Signs Last 24 Hrs  T(C): 37 (27 Feb 2025 05:57), Max: 37.1 (27 Feb 2025 05:02)  T(F): 98.6 (27 Feb 2025 05:57), Max: 98.8 (27 Feb 2025 05:02)  HR: 94 (27 Feb 2025 12:41) (74 - 96)  BP: 102/67 (27 Feb 2025 12:41) (102/67 - 123/81)  BP(mean): 78 (27 Feb 2025 12:41) (78 - 96)  RR: 18 (27 Feb 2025 12:41) (14 - 18)  SpO2: 94% (27 Feb 2025 12:41) (94% - 97%)    Parameters below as of 27 Feb 2025 12:41  Patient On (Oxygen Delivery Method): ventilator    O2 Concentration (%): 50        PHYSICAL EXAM:    Gen:  awake , in bed    HEENT: trach in place   CV: RRR, +S1/S2  Pulm: adequate respiratory effort, no increase in work of breathing  Abd: soft, ND  Skin: warm and dry,   Neuro: awake, does not talk, opening eyes to voice     LABS:                                              10.6   6.53  )-----------( 210      ( 27 Feb 2025 06:52 )             32.0     02-27    136  |  100  |  54[H]  ----------------------------<  147[H]  4.3   |  24  |  1.19    Ca    9.7      27 Feb 2025 06:52  Phos  4.4     02-27  Mg     2.2     02-27                      RADIOLOGY & ADDITIONAL TESTS:    Imaging Personally Reviewed:    Consultant(s) Notes Reviewed:      Care Discussed with Consultants/Other Providers:

## 2025-02-27 NOTE — PROGRESS NOTE ADULT - SUBJECTIVE AND OBJECTIVE BOX
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 stable, CTH 10/25: mostly resolved pontine hemorrhage, CTH 12/15: R mastoid air cell opacification; acute otitis media vs sterile effusion, CTH 12/18: stable. CTH 1/21 stable, CTH 1/27 stable  - MRI brain 10/12: parenchymal hemorrhage, acute/subacute R cerebellar stroke      CV:  - -160  - tachycardia resolved, s/p propranolol  - HTN: amlodipine 5mg  - echo (9/30) EF 75%, repeat 12/16: 57%    PULM:  - s/p trach exchange with pulm at bedside 2/14 to vent, AC/VC 40/400/14/6  - Secretions: duonebs/chest PT q4h  - CT chest 2/17: Near complete collapse b/l lower lobes. Patchy opacity within LLL/ALONDRA  - pulmonology consulted, recs appreciated    GI:  - TFs via PEG, candelaria condon renal  - bowel regimen held due to loose stool, last BM 2/23    Renal:  - IVL  - FW 200q6 for hydration  - Voiding, SC prn  - CKD: trend BUN/Cr  - renal US 10/1, 10/8, 1/15: Increased bilateral renal echogenicity consistent with medical renal disease  - urinary retention: doxazosin 4mg at bedtime     Endo:  - A1c 5.4    Heme:  - DVT ppx: SCDs, SQH 5000u q8h   - LE dopplers negative 12/16    ID:  - sputum 2/16 +Enterobacter cloacae: s/p Ertapenem (2/19-23) per ID recs, likely conolonized with stenotrophomonas  - +klebsiella UTI s/p levaquin (1/30-2/3)  - empiric PNA: cefepime (12/22-12/30), s/p vanc (12/22-12/23), s/p zosyn (12/15-12/20), s/p vanc (12/15-12/16), s/p zosyn (1/12 -1/18), s/p DS bactrim 2 tabs TID, 200 mg minocycline BID (2/18)  - +klebsiella UTI s/p   - s/p Stenotrophomonas maltophilia PNA: s/p Bactrim (11/18-11/19) s/p Cefepime (11/16-11/18)  - 11/3, (+) sputum for stenotrophomas maltophlia, blood cx (+) klebsiella, cefepime 2gq12 (11/6 - 11/12)   - S/p Ancef (10/4-10/14) for PNA, and s/p Unasyn (10/18-10/23) +actinobacter baumanii     MISC:  - BL keratitis: BL erythromycin ointment, artificial tears, lacrilube q4, moisture chamber   - Penile wound: wound care rec barrier wipes     Dispo: SDU status, DNR, pending SNF    D/w Dr. D'Amico

## 2025-02-28 PROCEDURE — 99232 SBSQ HOSP IP/OBS MODERATE 35: CPT

## 2025-02-28 PROCEDURE — 99231 SBSQ HOSP IP/OBS SF/LOW 25: CPT

## 2025-02-28 RX ORDER — METOPROLOL SUCCINATE 50 MG/1
12.5 TABLET, EXTENDED RELEASE ORAL EVERY 12 HOURS
Refills: 0 | Status: DISCONTINUED | OUTPATIENT
Start: 2025-02-28 | End: 2025-03-11

## 2025-02-28 RX ADMIN — HEPARIN SODIUM 5000 UNIT(S): 1000 INJECTION INTRAVENOUS; SUBCUTANEOUS at 06:39

## 2025-02-28 RX ADMIN — Medication 150 MILLIGRAM(S): at 03:35

## 2025-02-28 RX ADMIN — FLUTICASONE PROPIONATE 1 SPRAY(S): 50 SPRAY, METERED NASAL at 18:46

## 2025-02-28 RX ADMIN — Medication 1 SPRAY(S): at 18:46

## 2025-02-28 RX ADMIN — FLUTICASONE PROPIONATE 1 SPRAY(S): 50 SPRAY, METERED NASAL at 06:41

## 2025-02-28 RX ADMIN — Medication 1 APPLICATION(S): at 21:00

## 2025-02-28 RX ADMIN — Medication 1 SPRAY(S): at 06:40

## 2025-02-28 RX ADMIN — IPRATROPIUM BROMIDE AND ALBUTEROL SULFATE 3 MILLILITER(S): .5; 2.5 SOLUTION RESPIRATORY (INHALATION) at 18:45

## 2025-02-28 RX ADMIN — Medication 1 APPLICATION(S): at 18:45

## 2025-02-28 RX ADMIN — Medication 1 DROP(S): at 18:46

## 2025-02-28 RX ADMIN — ERYTHROMYCIN 1 APPLICATION(S): 5 OINTMENT OPHTHALMIC at 18:45

## 2025-02-28 RX ADMIN — Medication 1 MILLIGRAM(S): at 18:46

## 2025-02-28 RX ADMIN — IPRATROPIUM BROMIDE AND ALBUTEROL SULFATE 3 MILLILITER(S): .5; 2.5 SOLUTION RESPIRATORY (INHALATION) at 03:33

## 2025-02-28 RX ADMIN — LEVETIRACETAM 1500 MILLIGRAM(S): 10 INJECTION, SOLUTION INTRAVENOUS at 06:40

## 2025-02-28 RX ADMIN — ERYTHROMYCIN 1 APPLICATION(S): 5 OINTMENT OPHTHALMIC at 10:51

## 2025-02-28 RX ADMIN — IPRATROPIUM BROMIDE AND ALBUTEROL SULFATE 3 MILLILITER(S): .5; 2.5 SOLUTION RESPIRATORY (INHALATION) at 10:54

## 2025-02-28 RX ADMIN — Medication 1 APPLICATION(S): at 10:52

## 2025-02-28 RX ADMIN — Medication 1 MILLIGRAM(S): at 07:35

## 2025-02-28 RX ADMIN — Medication 200 MILLIGRAM(S): at 19:45

## 2025-02-28 RX ADMIN — HEPARIN SODIUM 5000 UNIT(S): 1000 INJECTION INTRAVENOUS; SUBCUTANEOUS at 21:00

## 2025-02-28 RX ADMIN — DOXAZOSIN MESYLATE 4 MILLIGRAM(S): 8 TABLET ORAL at 21:00

## 2025-02-28 RX ADMIN — IPRATROPIUM BROMIDE AND ALBUTEROL SULFATE 3 MILLILITER(S): .5; 2.5 SOLUTION RESPIRATORY (INHALATION) at 21:00

## 2025-02-28 RX ADMIN — Medication 1 DROP(S): at 10:51

## 2025-02-28 RX ADMIN — HEPARIN SODIUM 5000 UNIT(S): 1000 INJECTION INTRAVENOUS; SUBCUTANEOUS at 14:50

## 2025-02-28 RX ADMIN — Medication 1 DROP(S): at 14:49

## 2025-02-28 RX ADMIN — ERYTHROMYCIN 1 APPLICATION(S): 5 OINTMENT OPHTHALMIC at 02:41

## 2025-02-28 RX ADMIN — METOPROLOL SUCCINATE 12.5 MILLIGRAM(S): 50 TABLET, EXTENDED RELEASE ORAL at 21:00

## 2025-02-28 RX ADMIN — Medication 1 APPLICATION(S): at 14:49

## 2025-02-28 RX ADMIN — Medication 40 MILLIGRAM(S): at 11:20

## 2025-02-28 RX ADMIN — LEVETIRACETAM 1500 MILLIGRAM(S): 10 INJECTION, SOLUTION INTRAVENOUS at 18:44

## 2025-02-28 RX ADMIN — Medication 15 MILLILITER(S): at 06:43

## 2025-02-28 RX ADMIN — Medication 1 DROP(S): at 02:41

## 2025-02-28 RX ADMIN — Medication 1 DROP(S): at 21:00

## 2025-02-28 RX ADMIN — ERYTHROMYCIN 1 APPLICATION(S): 5 OINTMENT OPHTHALMIC at 14:55

## 2025-02-28 RX ADMIN — Medication 150 MILLIGRAM(S): at 11:20

## 2025-02-28 RX ADMIN — Medication 1 APPLICATION(S): at 02:41

## 2025-02-28 RX ADMIN — Medication 1 APPLICATION(S): at 06:41

## 2025-02-28 RX ADMIN — ERYTHROMYCIN 1 APPLICATION(S): 5 OINTMENT OPHTHALMIC at 21:00

## 2025-02-28 RX ADMIN — IPRATROPIUM BROMIDE AND ALBUTEROL SULFATE 3 MILLILITER(S): .5; 2.5 SOLUTION RESPIRATORY (INHALATION) at 06:39

## 2025-02-28 RX ADMIN — Medication 1 DROP(S): at 06:39

## 2025-02-28 RX ADMIN — IPRATROPIUM BROMIDE AND ALBUTEROL SULFATE 3 MILLILITER(S): .5; 2.5 SOLUTION RESPIRATORY (INHALATION) at 14:49

## 2025-02-28 RX ADMIN — Medication 15 MILLILITER(S): at 18:45

## 2025-02-28 RX ADMIN — ERYTHROMYCIN 1 APPLICATION(S): 5 OINTMENT OPHTHALMIC at 06:41

## 2025-02-28 NOTE — PROGRESS NOTE ADULT - SUBJECTIVE AND OBJECTIVE BOX
44y old  Male who presents with a chief complaint of brain stem bleed (23 Feb 2025 00:44)      SUBJECTIVE / OVERNIGHT EVENTS:  No acute events overnight per nursing staff  patient does not talk   Afebrile.  No leukocytosis    MEDICATIONS  (STANDING):  albuterol/ipratropium for Nebulization 3 milliLiter(s) Nebulizer every 4 hours  amLODIPine   Tablet 5 milliGRAM(s) Oral daily  artificial tears (preservative free) Ophthalmic Solution 1 Drop(s) Both EYES every 4 hours  chlorhexidine 0.12% Liquid 15 milliLiter(s) Oral Mucosa every 12 hours  doxazosin 4 milliGRAM(s) Oral at bedtime  erythromycin   Ointment 1 Application(s) Both EYES every 4 hours  fluticasone propionate 50 MICROgram(s)/spray Nasal Spray 1 Spray(s) Both Nostrils two times a day  heparin   Injectable 5000 Unit(s) SubCutaneous every 8 hours  influenza   Vaccine 0.5 milliLiter(s) IntraMuscular once  levETIRAcetam 1500 milliGRAM(s) Oral every 12 hours  LORazepam     Tablet 1 milliGRAM(s) Oral every 12 hours  pantoprazole   Suspension 40 milliGRAM(s) Oral daily  petrolatum Ophthalmic Ointment 1 Application(s) Both EYES every 4 hours  phenytoin   Suspension 150 milliGRAM(s) Oral <User Schedule>  phenytoin   Suspension 200 milliGRAM(s) Oral <User Schedule>  propranolol 5 milliGRAM(s) Oral every 8 hours  sodium chloride 0.65% Nasal 1 Spray(s) Both Nostrils two times a day    MEDICATIONS  (PRN):  acetaminophen   Oral Liquid .. 650 milliGRAM(s) Oral every 6 hours PRN Temp greater or equal to 38C (100.4F), Mild Pain (1 - 3)  LORazepam   Injectable 2 milliGRAM(s) IV Push once PRN Seizure Activity (NOTIFY PROVIDER PRIOR TO GIVING)          OBJECTIVE:  Vital Signs Last 24 Hrs  T(C): 37.4 (28 Feb 2025 09:03), Max: 37.6 (28 Feb 2025 04:45)  T(F): 99.3 (28 Feb 2025 09:03), Max: 99.6 (28 Feb 2025 04:45)  HR: 94 (28 Feb 2025 08:30) (86 - 102)  BP: 115/83 (28 Feb 2025 08:30) (102/67 - 144/93)  BP(mean): 94 (28 Feb 2025 08:30) (78 - 113)  RR: 14 (28 Feb 2025 08:30) (14 - 22)  SpO2: 98% (28 Feb 2025 08:30) (94% - 100%)    Parameters below as of 28 Feb 2025 08:30  Patient On (Oxygen Delivery Method): ventilator    O2 Concentration (%): 40        PHYSICAL EXAM:    Gen:  awake , in bed    HEENT: trach in place   CV: RRR, +S1/S2  Pulm: adequate respiratory effort, no increase in work of breathing  Abd: soft, ND, Peg Tube  Skin: warm and dry,   Neuro: awake, does not talk, opening eyes to voice     LABS:                                              10.6   6.53  )-----------( 210      ( 27 Feb 2025 06:52 )             32.0     02-27    136  |  100  |  54[H]  ----------------------------<  147[H]  4.3   |  24  |  1.19    Ca    9.7      27 Feb 2025 06:52  Phos  4.4     02-27  Mg     2.2     02-27                          RADIOLOGY & ADDITIONAL TESTS:    Imaging Personally Reviewed:    Consultant(s) Notes Reviewed:      Care Discussed with Consultants/Other Providers:

## 2025-02-28 NOTE — PROGRESS NOTE ADULT - SUBJECTIVE AND OBJECTIVE BOX
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 regimen held due to loose stool, last BM     Renal:  - IVL  - FW 200q6 for hydration  - Voiding, SC prn  - CKD: trend BUN/Cr  - renal US 10/1, 10/8, 1/15: Increased bilateral renal echogenicity consistent with medical renal disease  - urinary retention: doxazosin 4mg at bedtime     Endo:  - A1c 5.4    Heme:  - DVT ppx: SCDs, SQH 5000u q8h   - LE dopplers negative     ID:  - sputum  +Enterobacter cloacae: s/p Ertapenem (-) per ID recs, likely conolonized with stenotrophomonas  - +klebsiella UTI s/p levaquin (-2/3)  - empiric PNA: cefepime (-), s/p vanc (-), s/p zosyn (12/15-), s/p vanc (12/15-), s/p zosyn ( -), s/p DS bactrim 2 tabs TID, 200 mg minocycline BID ()  - +klebsiella UTI s/p   - s/p Stenotrophomonas maltophilia PNA: s/p Bactrim (-) s/p Cefepime (-)  - 11/3, (+) sputum for stenotrophomas maltophlia, blood cx (+) klebsiella, cefepime 2gq12 ( - )   - S/p Ancef (10/4-10/14) for PNA, and s/p Unasyn (10/18-10/23) +actinobacter baumanii     MISC:  - BL keratitis: BL erythromycin ointment, artificial tears, lacrilube q4, moisture chamber   - Penile wound: wound care rec barrier wipes     Dispo: SDU status, DNR, pending SNF    D/w Dr. D'Amico

## 2025-02-28 NOTE — PROGRESS NOTE ADULT - ASSESSMENT
44M with unclear PMH (?stroke, MI) who was found down at work, intubated for airway protection and found to have acute parenchymal hemorrhage within nabil with mass effect (+ acute/subacute right cerebellar infarct) in setting of hypertensive emergency, transferred to [facility] for further neurosurgical care. Hospital course c/b poor neurologic recovery s/p trach-PEG, AUR s/p gabriel, ANIKA on CKD c/b hyperkalemia, HAP s/p amp-sulbactam (EOT 10/23), K aerogenes bacteremia  treated with 2 weeks of Cefepime per ID , On 11/15 he became septic and was transferred to NSICU due to increased o2 requirements and needed Vent support , Trach Cultures + for Stenotrophomonas which was treated with 7 days of Bactrim per ID , has been weaned of Vent since 11/23 and is now on 10lts at 40% o2 through Trach Collar. Stepped up to the NSICU on 12/15 for hypoxia and tachycardia. Pt s/p  abx for 5 days. Pt now stepped down to 8Lach.    # Pontine hemorrhage  # Encephalopathy due to Intracranial Bleed   - Acute parenchymal hemorrhage within nabil with mass effect (+ acute/subacute right cerebellar infarct) in setting of hypertensive emergency.   - MRI brain also demonstrated acute/subacute R cerebellar stroke.   - No Neurosurgical Interventions were performed   - Trach, Vent and PEG Dependent    # Seizures  - Pt with episode of facial and leg twitching on 2/16 which resolved after the pt was given ativan 4mg x1. Off vEEG since 2/18   - Continue Seizure precautions   - Continue Keppra and phenytoin    # Hypertension  - Continue amlodipine 5mg daily with holding parameters ( SBP < 110 )   - if Amlodipine being consistently held , consider decreasing dose of Doxazosin     # Acute on Chronic respiratory failure   - s/p percutaneous trach by pulm on 10/14/24 - Trach Exchanged by Pulmonary on 2/14/25   - Pt's BAL on 2/13 growing Stenotrophomonas and Enterobacter Cloacae complex  - Continue vent support and chest PT  - F/u ID recommendations - Bactrim and Minocycline 2/17  switched to Ertapenem on 2/19 - End date 2/23 per ID   - Given numerous infectious foci, prolonged hospitalization, recurrent infections and need for broad spectrum antibiotics coverage, without the potential of further clinical recovery, would discuss consideration of no escalation of antibiotics past this current regimen.    # Neurogenic dysphagia.   - Pt s/p PEG placement by  surgery 10/21/24  - Continue TF per nutrition  - Continue aspiration precautions and elevation of HOB.    # Urinary retention  # BPH   - doxazosin 8mg qHS   - [place]/Condom Catheter     # Functional quadriplegia in setting of brainstem hemorrhage  - decub precautions  - care per nursing protocol     # ANIKA on CKD stage III   - Baseline Creatinine 1.1  - Resolved with IV Fluids   - Elevated BUN , Hgb stable , most likely prerenal    - Free water to 200q6h    # Anemia of Chronic Disease     # DVT prop  -Heparin SQ TID    # Dispo : Awaiting Transfer to [facility]

## 2025-03-01 LAB
ANION GAP SERPL CALC-SCNC: 14 MMOL/L — SIGNIFICANT CHANGE UP (ref 5–17)
BUN SERPL-MCNC: 59 MG/DL — HIGH (ref 7–23)
CALCIUM SERPL-MCNC: 9.5 MG/DL — SIGNIFICANT CHANGE UP (ref 8.4–10.5)
CHLORIDE SERPL-SCNC: 97 MMOL/L — SIGNIFICANT CHANGE UP (ref 96–108)
CO2 SERPL-SCNC: 25 MMOL/L — SIGNIFICANT CHANGE UP (ref 22–31)
CREAT SERPL-MCNC: 1.29 MG/DL — SIGNIFICANT CHANGE UP (ref 0.5–1.3)
EGFR: 69 ML/MIN/1.73M2 — SIGNIFICANT CHANGE UP
EGFR: 69 ML/MIN/1.73M2 — SIGNIFICANT CHANGE UP
GLUCOSE SERPL-MCNC: 89 MG/DL — SIGNIFICANT CHANGE UP (ref 70–99)
HCT VFR BLD CALC: 29.6 % — LOW (ref 39–50)
HGB BLD-MCNC: 10 G/DL — LOW (ref 13–17)
MAGNESIUM SERPL-MCNC: 2.3 MG/DL — SIGNIFICANT CHANGE UP (ref 1.6–2.6)
MCHC RBC-ENTMCNC: 31.8 PG — SIGNIFICANT CHANGE UP (ref 27–34)
MCHC RBC-ENTMCNC: 33.8 G/DL — SIGNIFICANT CHANGE UP (ref 32–36)
MCV RBC AUTO: 94.3 FL — SIGNIFICANT CHANGE UP (ref 80–100)
NRBC BLD AUTO-RTO: 0 /100 WBCS — SIGNIFICANT CHANGE UP (ref 0–0)
PHENYTOIN FREE SERPL-MCNC: 17.3 UG/ML — SIGNIFICANT CHANGE UP (ref 10–20)
PHOSPHATE SERPL-MCNC: 3.9 MG/DL — SIGNIFICANT CHANGE UP (ref 2.5–4.5)
PLATELET # BLD AUTO: 203 K/UL — SIGNIFICANT CHANGE UP (ref 150–400)
POTASSIUM SERPL-MCNC: 4 MMOL/L — SIGNIFICANT CHANGE UP (ref 3.5–5.3)
POTASSIUM SERPL-SCNC: 4 MMOL/L — SIGNIFICANT CHANGE UP (ref 3.5–5.3)
RBC # BLD: 3.14 M/UL — LOW (ref 4.2–5.8)
RBC # FLD: 12.4 % — SIGNIFICANT CHANGE UP (ref 10.3–14.5)
SODIUM SERPL-SCNC: 136 MMOL/L — SIGNIFICANT CHANGE UP (ref 135–145)
WBC # BLD: 6.36 K/UL — SIGNIFICANT CHANGE UP (ref 3.8–10.5)
WBC # FLD AUTO: 6.36 K/UL — SIGNIFICANT CHANGE UP (ref 3.8–10.5)

## 2025-03-01 PROCEDURE — 70450 CT HEAD/BRAIN W/O DYE: CPT | Mod: 26

## 2025-03-01 PROCEDURE — 99232 SBSQ HOSP IP/OBS MODERATE 35: CPT

## 2025-03-01 PROCEDURE — 99233 SBSQ HOSP IP/OBS HIGH 50: CPT

## 2025-03-01 RX ORDER — LACOSAMIDE 150 MG/1
100 TABLET, FILM COATED ORAL EVERY 12 HOURS
Refills: 0 | Status: DISCONTINUED | OUTPATIENT
Start: 2025-03-01 | End: 2025-03-01

## 2025-03-01 RX ORDER — POLYETHYLENE GLYCOL 3350 17 G/17G
17 POWDER, FOR SOLUTION ORAL DAILY
Refills: 0 | Status: DISCONTINUED | OUTPATIENT
Start: 2025-03-01 | End: 2025-03-13

## 2025-03-01 RX ORDER — LACOSAMIDE 150 MG/1
100 TABLET, FILM COATED ORAL EVERY 12 HOURS
Refills: 0 | Status: DISCONTINUED | OUTPATIENT
Start: 2025-03-01 | End: 2025-03-02

## 2025-03-01 RX ORDER — LACOSAMIDE 150 MG/1
200 TABLET, FILM COATED ORAL ONCE
Refills: 0 | Status: DISCONTINUED | OUTPATIENT
Start: 2025-03-01 | End: 2025-03-01

## 2025-03-01 RX ORDER — SENNA 187 MG
2 TABLET ORAL AT BEDTIME
Refills: 0 | Status: DISCONTINUED | OUTPATIENT
Start: 2025-03-01 | End: 2025-03-13

## 2025-03-01 RX ADMIN — Medication 40 MILLIGRAM(S): at 12:14

## 2025-03-01 RX ADMIN — Medication 1 APPLICATION(S): at 01:02

## 2025-03-01 RX ADMIN — ERYTHROMYCIN 1 APPLICATION(S): 5 OINTMENT OPHTHALMIC at 09:52

## 2025-03-01 RX ADMIN — Medication 1 APPLICATION(S): at 09:52

## 2025-03-01 RX ADMIN — Medication 1 SPRAY(S): at 05:27

## 2025-03-01 RX ADMIN — Medication 1 DROP(S): at 18:19

## 2025-03-01 RX ADMIN — Medication 1 SPRAY(S): at 18:20

## 2025-03-01 RX ADMIN — Medication 200 MILLIGRAM(S): at 19:24

## 2025-03-01 RX ADMIN — Medication 1 DROP(S): at 21:34

## 2025-03-01 RX ADMIN — HEPARIN SODIUM 5000 UNIT(S): 1000 INJECTION INTRAVENOUS; SUBCUTANEOUS at 21:34

## 2025-03-01 RX ADMIN — HEPARIN SODIUM 5000 UNIT(S): 1000 INJECTION INTRAVENOUS; SUBCUTANEOUS at 14:11

## 2025-03-01 RX ADMIN — Medication 150 MILLIGRAM(S): at 12:14

## 2025-03-01 RX ADMIN — Medication 1 DROP(S): at 09:56

## 2025-03-01 RX ADMIN — FLUTICASONE PROPIONATE 1 SPRAY(S): 50 SPRAY, METERED NASAL at 18:52

## 2025-03-01 RX ADMIN — Medication 15 MILLILITER(S): at 18:16

## 2025-03-01 RX ADMIN — LEVETIRACETAM 1500 MILLIGRAM(S): 10 INJECTION, SOLUTION INTRAVENOUS at 18:17

## 2025-03-01 RX ADMIN — Medication 1 APPLICATION(S): at 05:28

## 2025-03-01 RX ADMIN — Medication 1 DROP(S): at 14:14

## 2025-03-01 RX ADMIN — ERYTHROMYCIN 1 APPLICATION(S): 5 OINTMENT OPHTHALMIC at 14:13

## 2025-03-01 RX ADMIN — Medication 1 APPLICATION(S): at 14:14

## 2025-03-01 RX ADMIN — Medication 2 TABLET(S): at 21:43

## 2025-03-01 RX ADMIN — IPRATROPIUM BROMIDE AND ALBUTEROL SULFATE 3 MILLILITER(S): .5; 2.5 SOLUTION RESPIRATORY (INHALATION) at 14:11

## 2025-03-01 RX ADMIN — METOPROLOL SUCCINATE 12.5 MILLIGRAM(S): 50 TABLET, EXTENDED RELEASE ORAL at 09:52

## 2025-03-01 RX ADMIN — DOXAZOSIN MESYLATE 4 MILLIGRAM(S): 8 TABLET ORAL at 21:33

## 2025-03-01 RX ADMIN — ERYTHROMYCIN 1 APPLICATION(S): 5 OINTMENT OPHTHALMIC at 01:03

## 2025-03-01 RX ADMIN — Medication 1 DROP(S): at 01:02

## 2025-03-01 RX ADMIN — FLUTICASONE PROPIONATE 1 SPRAY(S): 50 SPRAY, METERED NASAL at 05:28

## 2025-03-01 RX ADMIN — ERYTHROMYCIN 1 APPLICATION(S): 5 OINTMENT OPHTHALMIC at 18:18

## 2025-03-01 RX ADMIN — HEPARIN SODIUM 5000 UNIT(S): 1000 INJECTION INTRAVENOUS; SUBCUTANEOUS at 05:27

## 2025-03-01 RX ADMIN — IPRATROPIUM BROMIDE AND ALBUTEROL SULFATE 3 MILLILITER(S): .5; 2.5 SOLUTION RESPIRATORY (INHALATION) at 18:18

## 2025-03-01 RX ADMIN — Medication 1 APPLICATION(S): at 21:36

## 2025-03-01 RX ADMIN — LACOSAMIDE 120 MILLIGRAM(S): 150 TABLET, FILM COATED ORAL at 23:19

## 2025-03-01 RX ADMIN — IPRATROPIUM BROMIDE AND ALBUTEROL SULFATE 3 MILLILITER(S): .5; 2.5 SOLUTION RESPIRATORY (INHALATION) at 09:52

## 2025-03-01 RX ADMIN — IPRATROPIUM BROMIDE AND ALBUTEROL SULFATE 3 MILLILITER(S): .5; 2.5 SOLUTION RESPIRATORY (INHALATION) at 21:33

## 2025-03-01 RX ADMIN — Medication 1 DROP(S): at 05:28

## 2025-03-01 RX ADMIN — Medication 1 MILLIGRAM(S): at 18:16

## 2025-03-01 RX ADMIN — Medication 1 MILLIGRAM(S): at 05:00

## 2025-03-01 RX ADMIN — IPRATROPIUM BROMIDE AND ALBUTEROL SULFATE 3 MILLILITER(S): .5; 2.5 SOLUTION RESPIRATORY (INHALATION) at 01:02

## 2025-03-01 RX ADMIN — Medication 15 MILLILITER(S): at 05:26

## 2025-03-01 RX ADMIN — ERYTHROMYCIN 1 APPLICATION(S): 5 OINTMENT OPHTHALMIC at 05:27

## 2025-03-01 RX ADMIN — Medication 150 MILLIGRAM(S): at 05:27

## 2025-03-01 RX ADMIN — LACOSAMIDE 140 MILLIGRAM(S): 150 TABLET, FILM COATED ORAL at 19:24

## 2025-03-01 RX ADMIN — Medication 1 APPLICATION(S): at 18:19

## 2025-03-01 RX ADMIN — POLYETHYLENE GLYCOL 3350 17 GRAM(S): 17 POWDER, FOR SOLUTION ORAL at 21:34

## 2025-03-01 RX ADMIN — IPRATROPIUM BROMIDE AND ALBUTEROL SULFATE 3 MILLILITER(S): .5; 2.5 SOLUTION RESPIRATORY (INHALATION) at 05:27

## 2025-03-01 RX ADMIN — LEVETIRACETAM 1500 MILLIGRAM(S): 10 INJECTION, SOLUTION INTRAVENOUS at 05:00

## 2025-03-01 RX ADMIN — ERYTHROMYCIN 1 APPLICATION(S): 5 OINTMENT OPHTHALMIC at 21:34

## 2025-03-01 NOTE — PROVIDER CONTACT NOTE (OTHER) - ASSESSMENT
Pt is obtunded and only moves his lower right extremity to pain, other extremities have no movement. Neuro exam charted in flow sheet @ 6026

## 2025-03-01 NOTE — PROGRESS NOTE ADULT - SUBJECTIVE AND OBJECTIVE BOX
Patient was seen and examined at bedside. Case discuss with the primary team. Pt w/o any events overnight.     OBJECTIVE:  Vital Signs Last 24 Hrs  T(C): 37.1 (01 Mar 2025 08:43), Max: 37.1 (28 Feb 2025 14:45)  T(F): 98.7 (01 Mar 2025 08:43), Max: 98.7 (28 Feb 2025 14:45)  HR: 78 (01 Mar 2025 08:43) (78 - 100)  BP: 101/72 (01 Mar 2025 08:43) (101/72 - 143/97)  BP(mean): 83 (01 Mar 2025 08:43) (83 - 115)  RR: 14 (01 Mar 2025 08:43) (14 - 19)  SpO2: 99% (01 Mar 2025 08:43) (97% - 100%)    Parameters below as of 01 Mar 2025 08:43  Patient On (Oxygen Delivery Method): ventilator    O2 Concentration (%): 40      PHYSICAL EXAM:  Gen:  awake , in bed    HEENT: trach in place   CV: RRR, +S1/S2  Pulm: adequate respiratory effort, no increase in work of breathing  Abd: soft, ND, Peg Tube  Skin: warm and dry,   Neuro: somnolent; minimally responsive    LABS:                        10.0   6.36  )-----------( 203      ( 01 Mar 2025 09:16 )             29.6     03-01    136  |  97  |  59[H]  ----------------------------<  89  4.0   |  25  |  1.29    Ca    9.5      01 Mar 2025 09:16  Phos  3.9     03-01  Mg     2.3     03-01        acetaminophen   Oral Liquid .. 650 milliGRAM(s) Oral every 6 hours PRN  albuterol/ipratropium for Nebulization 3 milliLiter(s) Nebulizer every 4 hours  artificial tears (preservative free) Ophthalmic Solution 1 Drop(s) Both EYES every 4 hours  chlorhexidine 0.12% Liquid 15 milliLiter(s) Oral Mucosa every 12 hours  doxazosin 4 milliGRAM(s) Oral at bedtime  erythromycin   Ointment 1 Application(s) Both EYES every 4 hours  fluticasone propionate 50 MICROgram(s)/spray Nasal Spray 1 Spray(s) Both Nostrils two times a day  heparin   Injectable 5000 Unit(s) SubCutaneous every 8 hours  influenza   Vaccine 0.5 milliLiter(s) IntraMuscular once  levETIRAcetam 1500 milliGRAM(s) Oral every 12 hours  LORazepam     Tablet 1 milliGRAM(s) Oral every 12 hours  LORazepam   Injectable 2 milliGRAM(s) IV Push once PRN  metoprolol tartrate 12.5 milliGRAM(s) Enteral Tube every 12 hours  pantoprazole   Suspension 40 milliGRAM(s) Oral daily  petrolatum Ophthalmic Ointment 1 Application(s) Both EYES every 4 hours  phenytoin   Suspension 150 milliGRAM(s) Oral <User Schedule>  phenytoin   Suspension 200 milliGRAM(s) Oral <User Schedule>  sodium chloride 0.65% Nasal 1 Spray(s) Both Nostrils two times a day

## 2025-03-01 NOTE — PROGRESS NOTE ADULT - ASSESSMENT
44M with unclear PMH (?stroke, MI) who was found down at work, intubated for airway protection and found to have acute parenchymal hemorrhage within nabil with mass effect (+ acute/subacute right cerebellar infarct) in setting of hypertensive emergency, transferred to [facility] for further neurosurgical care. Hospital course c/b poor neurologic recovery s/p trach-PEG, AUR s/p gabriel, ANIKA on CKD c/b hyperkalemia, HAP s/p amp-sulbactam (EOT 10/23), K aerogenes bacteremia  treated with 2 weeks of Cefepime per ID , On 11/15 he became septic and was transferred to NSICU due to increased o2 requirements and needed Vent support , Trach Cultures + for Stenotrophomonas which was treated with 7 days of Bactrim per ID , has been weaned of Vent since 11/23 and is now on 10lts at 40% o2 through Trach Collar. Stepped up to the NSICU on 12/15 for hypoxia and tachycardia. Pt s/p  abx for 5 days. Pt now stepped down to 8Lach.    # Pontine hemorrhage  # Encephalopathy due to Intracranial Bleed   - Acute parenchymal hemorrhage within nabil with mass effect (+ acute/subacute right cerebellar infarct) in setting of hypertensive emergency.   - MRI brain also demonstrated acute/subacute R cerebellar stroke.   - No Neurosurgical Interventions were performed   - Trach, Vent and PEG Dependent    # Seizures  - Continue Seizure precautions   - Continue Keppra and phenytoin    # Hypertension  - Amlodipine d/c'ed on 2/28   -Continue Doxazosin     # Acute on Chronic respiratory failure   - s/p percutaneous trach by pulm on 10/14/24 - Trach Exchanged by Pulmonary on 2/14/25 ; Pt completed abx tx course with Ertapenem EOT  2/23 per ID   - Pt's BAL on 2/13 growing Stenotrophomonas and Enterobacter Cloacae complex  - Continue vent support and chest PT.    # Neurogenic dysphagia.   - Pt s/p PEG placement by  surgery 10/21/24  - Continue TF per nutrition  - Continue aspiration precautions and elevation of HOB.    # Urinary retention  # BPH   - doxazosin 4mg qHS   - [place]/Cond[place] Catheter     # Functional quadriplegia in setting of brainstem hemorrhage  - decub precautions  - care per nursing protocol     # ANIAK on CKD stage III   - Pt's creatinine is 1.29 ; Baseline Creatinine 1.1  - Continue Free water via PEG @ 200q6h  -Will continue to monitor creatinine     # Anemia of Chronic Disease   -Will continue to monitor H/H stable ~10     # DVT prop  -Heparin SQ TID    # Dispo : Awaiting Transfer to [facility]      55 minutes spent on total encounter. The necessity of the time spent during the encounter on this date of service was due to:    Review of hospital course, labs, vitals, medical records.  Bedside exam and interview of the patient  Discussed plan of care with primary team   Documenting the encounter.   44M with unclear PMH (?stroke, MI) who was found down at work, intubated for airway protection and found to have acute parenchymal hemorrhage within nabil with mass effect (+ acute/subacute right cerebellar infarct) in setting of hypertensive emergency, transferred to [facility] for further neurosurgical care. Hospital course c/b poor neurologic recovery s/p trach-PEG, AUR s/p gabriel, ANIKA on CKD c/b hyperkalemia, HAP s/p amp-sulbactam (EOT 10/23), K aerogenes bacteremia  treated with 2 weeks of Cefepime per ID , On 11/15 he became septic and was transferred to NSICU due to increased o2 requirements and needed Vent support , Trach Cultures + for Stenotrophomonas which was treated with 7 days of Bactrim per ID , has been weaned of Vent since 11/23 and is now on 10lts at 40% o2 through Trach Collar. Stepped up to the NSICU on 12/15 for hypoxia and tachycardia. Pt s/p  abx for 5 days. Pt now stepped down to 8Lach.    #AMS  -Pt was more somnolent; minimally responsive this morning compared to prior as well as some mouth twitching   -Will check a stat Head CT w/o to further evaluate   -Will consult [employer] about possible adjustment in pt's AEDs       # Pontine hemorrhage  # Encephalopathy due to Intracranial Bleed   - Acute parenchymal hemorrhage within nabil with mass effect (+ acute/subacute right cerebellar infarct) in setting of hypertensive emergency.   - MRI brain also demonstrated acute/subacute R cerebellar stroke.   - No Neurosurgical Interventions were performed   - Trach, Vent and PEG Dependent    # Seizures  - Continue Seizure precautions   - Continue Keppra and phenytoin    # Hypertension  - Amlodipine d/c'ed on 2/28   -Continue Doxazosin     # Acute on Chronic respiratory failure   - s/p percutaneous trach by pulm on 10/14/24 - Trach Exchanged by Pulmonary on 2/14/25 ; Pt completed abx tx course with Ertapenem EOT  2/23 per ID   - Pt's BAL on 2/13 growing Stenotrophomonas and Enterobacter Cloacae complex  - Continue vent support and chest PT.    # Neurogenic dysphagia.   - Pt s/p PEG placement by  surgery 10/21/24  - Continue TF per nutrition  - Continue aspiration precautions and elevation of HOB.    # Urinary retention  # BPH   - doxazosin 4mg qHS   - [place] Catheter     # Functional quadriplegia in setting of brainstem hemorrhage  - decub precautions  - care per nursing protocol     # ANIKA on CKD stage III   - Pt's creatinine is 1.29 ; Baseline Creatinine 1.1  - Continue Free water via PEG @ 200q6h  -Will continue to monitor creatinine     # Anemia of Chronic Disease   -Will continue to monitor H/H stable ~10     # DVT prop  -Heparin SQ TID    # Dispo : Awaiting Transfer to [facility]      55 minutes spent on total encounter. The necessity of the time spent during the encounter on this date of service was due to:    Review of hospital course, labs, vitals, medical records.  Bedside exam and interview of the patient  Discussed plan of care with primary team   Documenting the encounter.

## 2025-03-01 NOTE — PROVIDER CONTACT NOTE (OTHER) - ASSESSMENT
Pt is diaphoretic and obtunded, only opens eyes to pain and repeated vigorous stimulation, withdraws extremities to pain. Neuro exam charted in flow sheet @ 843. Rectal temp 98.7 F.

## 2025-03-01 NOTE — PROGRESS NOTE ADULT - SUBJECTIVE AND OBJECTIVE BOX
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 planning/counseling discussion  Encounter for palliative care  Pontine hemorrhage  Neurogenic dysphagia  Chronic respiratory failure  Acute kidney injury superimposed on CKD  Acute urinary retention  Hypertensive emergency  Sepsis, unspecified organism  Sepsis  Gram-negative bacteremia  Dyspnea  Percutaneous tracheal puncture  Altered mental status examination  EGD, with PEG  AMS  Room Service Assist  EGD, with PEG  Functional quadriplegia  Neurogenic dysphagia  Chronic respiratory failure  Encounter for palliative care  Acute urinary retention  Hypertension  Seizures  [email or web address]        PLAN:  Neuro:  - neuro/vitals q4h  - pain control: tylenol prn  - seizure tx: keppra 1500mg BID, phenytoin 150/150/200, ativan 1mg q12  - s/p vEEG: +seizure on 2/17 (o/w neg x 5)  - CTH 9/30: enlarged pontine hemorrhage, CTH 10/3: stable, CTH 10/25: mostly resolved pontine hemorrhage, CTH 12/15: R mastoid air cell opacification; acute otitis media vs sterile effusion, CTH 12/18: stable. CTH 1/21 stable, CTH 1/27 stable  - MRI brain 10/12: parenchymal hemorrhage, acute/subacute R cerebellar stroke      CV:  - -160  - HTN/tachycardia: Metoprolol 12.5 mg BID   - echo (9/30) EF 75%, repeat 12/16: 57%    PULM:  - s/p trach exchange with pulm at bedside 2/14 to vent, AC/VC 40/400/14/6  - Secretions: duonebs/chest PT q4h  - CT chest 2/17: Near complete collapse b/l lower lobes. Patchy opacity within LLL/ALONDRA  - pulmonology consulted, recs appreciated    GI:  - TFs via PEG, candelaria roseanna renal  - bowel regimen held due to loose stool, last BM 2/25    Renal:  - IVL  - FW 200q6 for hydration  - Voiding, SC prn  - CKD: trend BUN/Cr  - renal US 10/1, 10/8, 1/15: Increased bilateral renal echogenicity consistent with medical renal disease  - urinary retention: doxazosin 4mg at bedtime     Endo:  - A1c 5.4    Heme:  - DVT ppx: SCDs, SQH 5000u q8h   - LE dopplers negative 12/16    ID:  - sputum 2/16 +Enterobacter cloacae: s/p Ertapenem (2/19-23) per ID recs, likely conolonized with stenotrophomonas  - +klebsiella UTI s/p levaquin (1/30-2/3)  - empiric PNA: cefepime (12/22-12/30), s/p vanc (12/22-12/23), s/p zosyn (12/15-12/20), s/p vanc (12/15-12/16), s/p zosyn (1/12 -1/18), s/p DS bactrim 2 tabs TID, 200 mg minocycline BID (2/18)  - +klebsiella UTI s/p   - s/p Stenotrophomonas maltophilia PNA: s/p Bactrim (11/18-11/19) s/p Cefepime (11/16-11/18)  - 11/3, (+) sputum for stenotrophomas maltophlia, blood cx (+) klebsiella, cefepime 2gq12 (11/6 - 11/12)   - S/p Ancef (10/4-10/14) for PNA, and s/p Unasyn (10/18-10/23) +actinobacter baumanii     MISC:  - BL keratitis: BL erythromycin ointment, artificial tears, lacrilube q4, moisture chamber   - Penile wound: wound care rec barrier wipes     Dispo: SDU status, DNR, pending SNF    D/w Dr. D'Amico   Assessment:  Present when checked    []  GCS  E   V  M     Heart Failure: []Acute, [] acute on chronic , []chronic  Heart Failure:  [] Diastolic (HFpEF), [] Systolic (HFrEF), []Combined (HFpEF and HFrEF), [] RHF, [] Pulm HTN, [] Other    [] ANIKA, [] ATN, [] AIN, [] other  [] CKD1, [] CKD2, [] CKD 3, [] CKD 4, [] CKD 5, []ESRD    Encephalopathy: [] Metabolic, [] Hepatic, [] toxic, [] Neurological, [] Other    Abnormal Nurtitional Status: [] malnurtition (see nutrition note), [ ]underweight: BMI < 19, [] morbid obesity: BMI >40, [] Cachexia    [] Sepsis  [] hypovolemic shock,[] cardiogenic shock, [] hemorrhagic shock, [] neuogenic shock  [] Acute Respiratory Failure  []Cerebral edema, [] Brain compression/ herniation,   [] Functional quadriplegia  [] Acute blood loss anemia

## 2025-03-01 NOTE — CHART NOTE - NSCHARTNOTEFT_GEN_A_CORE
Patient less responsive today, EEG ordered and in first 30 minutes reviewed, 2 episodes concerning for focal motor seizure captured manifesting with head jerking movement to the Right along with Right arm stiffening .     Discussed with neurology resident and formal consult to follow; however, our recommendations currently are the followin) c/w VEEG  2) Check CMP, Levetiracetam and Phenytoin trough levels in AM (an hour or two prior to AM doses).  3) Load Vimpat 200mg X 1 stat, then Vimpat 100mg BID maintenance starting at 12am.

## 2025-03-01 NOTE — PROGRESS NOTE ADULT - SUBJECTIVE AND OBJECTIVE BOX
Neurology Progress Note    Interval History: Epilepsy team was called back due to concern of seizures given worsening mental status as of this morning. Primary team reports patient has been having left facial twiching and is less awake. The nurse stefan states, patient is less awake than he had seen him before. He states patient has always had facial twitching on and off and could previously attempt following some commands, but currently does not follow any commands. Unable to obtain ROS from patient given mental status      Medications:  acetaminophen   Oral Liquid .. 650 milliGRAM(s) Oral every 6 hours PRN  albuterol/ipratropium for Nebulization 3 milliLiter(s) Nebulizer every 4 hours  artificial tears (preservative free) Ophthalmic Solution 1 Drop(s) Both EYES every 4 hours  chlorhexidine 0.12% Liquid 15 milliLiter(s) Oral Mucosa every 12 hours  doxazosin 4 milliGRAM(s) Oral at bedtime  erythromycin   Ointment 1 Application(s) Both EYES every 4 hours  fluticasone propionate 50 MICROgram(s)/spray Nasal Spray 1 Spray(s) Both Nostrils two times a day  heparin   Injectable 5000 Unit(s) SubCutaneous every 8 hours  influenza   Vaccine 0.5 milliLiter(s) IntraMuscular once  levETIRAcetam 1500 milliGRAM(s) Oral every 12 hours  LORazepam     Tablet 1 milliGRAM(s) Oral every 12 hours  LORazepam   Injectable 2 milliGRAM(s) IV Push once PRN  metoprolol tartrate 12.5 milliGRAM(s) Enteral Tube every 12 hours  pantoprazole   Suspension 40 milliGRAM(s) Oral daily  petrolatum Ophthalmic Ointment 1 Application(s) Both EYES every 4 hours  phenytoin   Suspension 150 milliGRAM(s) Oral <User Schedule>  phenytoin   Suspension 200 milliGRAM(s) Oral <User Schedule>  sodium chloride 0.65% Nasal 1 Spray(s) Both Nostrils two times a day      Vital Signs Last 24 Hrs  T(C): 36.3 (01 Mar 2025 14:04), Max: 37.1 (01 Mar 2025 04:53)  T(F): 97.3 (01 Mar 2025 14:04), Max: 98.7 (01 Mar 2025 04:53)  HR: 72 (01 Mar 2025 16:18) (66 - 92)  BP: 117/72 (01 Mar 2025 16:18) (101/72 - 143/97)  BP(mean): 90 (01 Mar 2025 16:18) (83 - 115)  RR: 14 (01 Mar 2025 16:18) (14 - 19)  SpO2: 100% (01 Mar 2025 16:18) (99% - 100%)    Parameters below as of 01 Mar 2025 16:18  Patient On (Oxygen Delivery Method): ventilator    O2 Concentration (%): 40    Neurological Exam:   Mental status: Stupurous, awakens to painful stimuli but returns to sleep, unable to follow commands, diaphoretic  Eyes: Left eye conjunctival injection, pupils reactive to light, unable to track  Face: bilateral asynchonous facial twitch - left upper face and right lower face involved more apparently upon waking up  Motor: Unable to reliably asses given mental status. flaccid tone throughout. Stimulus induced myoclonus in b/l lower ext.   Sensation: Localizes to painful stimuli in b/l LE, and LUE  Coordination: Unable to asses   Reflexes: upgoing toes bilaterally.      Labs:  CBC Full  -  ( 01 Mar 2025 09:16 )  WBC Count : 6.36 K/uL  RBC Count : 3.14 M/uL  Hemoglobin : 10.0 g/dL  Hematocrit : 29.6 %  Platelet Count - Automated : 203 K/uL  Mean Cell Volume : 94.3 fl  Mean Cell Hemoglobin : 31.8 pg  Mean Cell Hemoglobin Concentration : 33.8 g/dL  Auto Neutrophil # : x  Auto Lymphocyte # : x  Auto Monocyte # : x  Auto Eosinophil # : x  Auto Basophil # : x  Auto Neutrophil % : x  Auto Lymphocyte % : x  Auto Monocyte % : x  Auto Eosinophil % : x  Auto Basophil % : x    03-01    136  |  97  |  59[H]  ----------------------------<  89  4.0   |  25  |  1.29    Ca    9.5      01 Mar 2025 09:16  Phos  3.9     03-01  Mg     2.3     03-01        Urinalysis Basic - ( 01 Mar 2025 09:16 )    Color: x / Appearance: x / SG: x / pH: x  Gluc: 89 mg/dL / Ketone: x  / Bili: x / Urobili: x   Blood: x / Protein: x / Nitrite: x   Leuk Esterase: x / RBC: x / WBC x   Sq Epi: x / Non Sq Epi: x / Bacteria: x       Neurology Progress Note    Interval History: Epilepsy team was called back due to concern of seizures given worsening mental status as of this morning. Primary team reports patient has been having left facial twiching and is less awake. The nurse stefan states, patient is less awake than he had seen him before. He states patient has always had facial twitching on and off and could previously attempt following some commands, but currently does not follow any commands. Unable to obtain ROS from patient given mental status      Medications:  acetaminophen   Oral Liquid .. 650 milliGRAM(s) Oral every 6 hours PRN  albuterol/ipratropium for Nebulization 3 milliLiter(s) Nebulizer every 4 hours  artificial tears (preservative free) Ophthalmic Solution 1 Drop(s) Both EYES every 4 hours  chlorhexidine 0.12% Liquid 15 milliLiter(s) Oral Mucosa every 12 hours  doxazosin 4 milliGRAM(s) Oral at bedtime  erythromycin   Ointment 1 Application(s) Both EYES every 4 hours  fluticasone propionate 50 MICROgram(s)/spray Nasal Spray 1 Spray(s) Both Nostrils two times a day  heparin   Injectable 5000 Unit(s) SubCutaneous every 8 hours  influenza   Vaccine 0.5 milliLiter(s) IntraMuscular once  levETIRAcetam 1500 milliGRAM(s) Oral every 12 hours  LORazepam     Tablet 1 milliGRAM(s) Oral every 12 hours  LORazepam   Injectable 2 milliGRAM(s) IV Push once PRN  metoprolol tartrate 12.5 milliGRAM(s) Enteral Tube every 12 hours  pantoprazole   Suspension 40 milliGRAM(s) Oral daily  petrolatum Ophthalmic Ointment 1 Application(s) Both EYES every 4 hours  phenytoin   Suspension 150 milliGRAM(s) Oral <User Schedule>  phenytoin   Suspension 200 milliGRAM(s) Oral <User Schedule>  sodium chloride 0.65% Nasal 1 Spray(s) Both Nostrils two times a day      Vital Signs Last 24 Hrs  T(C): 36.3 (01 Mar 2025 14:04), Max: 37.1 (01 Mar 2025 04:53)  T(F): 97.3 (01 Mar 2025 14:04), Max: 98.7 (01 Mar 2025 04:53)  HR: 72 (01 Mar 2025 16:18) (66 - 92)  BP: 117/72 (01 Mar 2025 16:18) (101/72 - 143/97)  BP(mean): 90 (01 Mar 2025 16:18) (83 - 115)  RR: 14 (01 Mar 2025 16:18) (14 - 19)  SpO2: 100% (01 Mar 2025 16:18) (99% - 100%)    Parameters below as of 01 Mar 2025 16:18  Patient On (Oxygen Delivery Method): ventilator    O2 Concentration (%): 40    Neurological Exam:   Mental status: Stuporous awakens to painful stimuli but returns to sleep, unable to follow commands, diaphoretic  Eyes: Left eye conjunctival injection, pupils reactive to light, unable to track  Face: bilateral asynchronous facial twitch - left upper face and right lower face involved more apparently upon waking up  Motor: Unable to reliably asses given mental status. flaccid tone throughout. Stimulus induced myoclonus in b/l lower ext.   Sensation: Localizes to painful stimuli in b/l LE, and LUE  Coordination: Unable to asses   Reflexes: upgoing toes bilaterally.      Labs:  CBC Full  -  ( 01 Mar 2025 09:16 )  WBC Count : 6.36 K/uL  RBC Count : 3.14 M/uL  Hemoglobin : 10.0 g/dL  Hematocrit : 29.6 %  Platelet Count - Automated : 203 K/uL  Mean Cell Volume : 94.3 fl  Mean Cell Hemoglobin : 31.8 pg  Mean Cell Hemoglobin Concentration : 33.8 g/dL  Auto Neutrophil # : x  Auto Lymphocyte # : x  Auto Monocyte # : x  Auto Eosinophil # : x  Auto Basophil # : x  Auto Neutrophil % : x  Auto Lymphocyte % : x  Auto Monocyte % : x  Auto Eosinophil % : x  Auto Basophil % : x    03-01    136  |  97  |  59[H]  ----------------------------<  89  4.0   |  25  |  1.29    Ca    9.5      01 Mar 2025 09:16  Phos  3.9     03-01  Mg     2.3     03-01        Urinalysis Basic - ( 01 Mar 2025 09:16 )    Color: x / Appearance: x / SG: x / pH: x  Gluc: 89 mg/dL / Ketone: x  / Bili: x / Urobili: x   Blood: x / Protein: x / Nitrite: x   Leuk Esterase: x / RBC: x / WBC x   Sq Epi: x / Non Sq Epi: x / Bacteria: x

## 2025-03-01 NOTE — PROGRESS NOTE ADULT - ASSESSMENT
44 year-old-male with unclear PMH (?stroke, MI) who was found down at work, intubated for airway protection and found to have acute large parenchymal hemorrhage within nabil with mass effect (+ acute/subacute right cerebellar infarct) in setting of hypertensive emergency, and R cerebellar stroke transferred to [facility] for further neurosurgical care. Epilepsy called back today for concern of seizure contributing to worsening mental status as repeat CTH was negative. On exam, patient was stuporous, diaphoretic with asynchronous bilateral facial twitching Otherwise unable to follow commands or track. Previous vEEGs were notable for right quadrant anterior seizures and epileptiform potential. Patient has long standing intermittent facial twitching at times not correlated to seizures as per chart review. Therefore, it is unclear if patient's mental status due to seizure vs metabolic encephalopathy. Would need to r/o seizures with vEEG.    Recommendations:  - Restart vEEG - will monitor for any seizures after a few hours  - Obtain Keppra trough prior to AM dose  - Obtain Dilantin trough prior to next dose  - Continue Keppra 1500mg Q12hrs   - Continue Phenytoin to 150/150/200mg   - Continue Ativan 1 mg to q12h   - Maintain seizure/fall/aspiration precautions  - Management per primary team     Discussed with Attending 44 year-old-male with unclear PMH (?stroke, MI) who was found down at work, intubated for airway protection and found to have acute large parenchymal hemorrhage within nabil with mass effect (+ acute/subacute right cerebellar infarct) in setting of hypertensive emergency, and R cerebellar stroke transferred to [facility] for further neurosurgical care. Epilepsy called back today for concern of seizure contributing to worsening mental status as repeat CTH was negative. On exam, patient was stuporous, diaphoretic with asynchronous bilateral facial twitching Otherwise unable to follow commands or track. Previous vEEGs were notable for right quadrant anterior seizures and epileptiform potential. Patient has long standing intermittent facial twitching at times not correlated to seizures as per chart review. Therefore, it is unclear if patient's mental status due to seizure vs metabolic encephalopathy. Would need to r/o seizures with vEEG.    Addendum: One clinical event captured within minutes on vEEG - Would load with Vimpat 200mg now and start 100mg starting at midnight. Also obtain CMP for [facility] morning    Recommendations:  - Restart vEEG - will monitor for any seizures after a few hours  - Obtain Keppra trough prior to AM dose  - Obtain Dilantin trough prior to next dose  - Continue Keppra 1500mg Q12hrs   - Continue Phenytoin to 150/150/200mg   - Last AED levels obtained were therapeutic - Keppra 37, Dilantin 18  - Continue Ativan 1 mg to q12h   - Maintain seizure/fall/aspiration precautions  - Management per primary team     Discussed with Attending 44 year-old-male with unclear PMH (?stroke, MI) who was found down at work, intubated for airway protection and found to have acute large parenchymal hemorrhage within nabil with mass effect (+ acute/subacute right cerebellar infarct) in setting of hypertensive emergency, and R cerebellar stroke transferred to [facility] for further neurosurgical care. Epilepsy called back today for concern of seizure contributing to worsening mental status as repeat CTH was negative. On exam, patient was stuporous, diaphoretic with asynchronous bilateral facial twitching Otherwise unable to follow commands or track. Previous vEEGs were notable for right quadrant anterior seizures and epileptiform potential. Patient has long standing intermittent facial twitching at times not correlated to seizures as per chart review. Therefore, it is unclear if patient's mental status due to seizure vs metabolic encephalopathy. Would need to r/o seizures with vEEG.    Addendum: One clinical event captured within minutes on vEEG - Would load with Vimpat 200mg now and start 100mg starting at midnight. Also obtain CMP for tmrw morning    Recommendations:  - Restart vEEG - will monitor for any seizures after a few hours  - Obtain Keppra trough prior to AM dose  - Obtain Dilantin trough prior to next dose  - Continue Keppra 1500mg Q12hrs   - Continue Phenytoin to 150/150/200mg   - Last AED levels obtained were therapeutic - Keppra 37, Dilantin 18  - Continue Ativan 1 mg to q12h   - Maintain seizure/fall/aspiration precautions  - Management per primary team     Discussed with Attending

## 2025-03-02 LAB
ALBUMIN SERPL ELPH-MCNC: 3.6 G/DL — SIGNIFICANT CHANGE UP (ref 3.3–5)
ALP SERPL-CCNC: 211 U/L — HIGH (ref 40–120)
ALT FLD-CCNC: 15 U/L — SIGNIFICANT CHANGE UP (ref 10–45)
ANION GAP SERPL CALC-SCNC: 13 MMOL/L — SIGNIFICANT CHANGE UP (ref 5–17)
AST SERPL-CCNC: 12 U/L — SIGNIFICANT CHANGE UP (ref 10–40)
BILIRUB SERPL-MCNC: <0.2 MG/DL — SIGNIFICANT CHANGE UP (ref 0.2–1.2)
BUN SERPL-MCNC: 57 MG/DL — HIGH (ref 7–23)
CALCIUM SERPL-MCNC: 9.4 MG/DL — SIGNIFICANT CHANGE UP (ref 8.4–10.5)
CHLORIDE SERPL-SCNC: 100 MMOL/L — SIGNIFICANT CHANGE UP (ref 96–108)
CO2 SERPL-SCNC: 23 MMOL/L — SIGNIFICANT CHANGE UP (ref 22–31)
CREAT SERPL-MCNC: 1.14 MG/DL — SIGNIFICANT CHANGE UP (ref 0.5–1.3)
EGFR: 80 ML/MIN/1.73M2 — SIGNIFICANT CHANGE UP
EGFR: 80 ML/MIN/1.73M2 — SIGNIFICANT CHANGE UP
GLUCOSE SERPL-MCNC: 121 MG/DL — HIGH (ref 70–99)
POTASSIUM SERPL-MCNC: 4 MMOL/L — SIGNIFICANT CHANGE UP (ref 3.5–5.3)
POTASSIUM SERPL-SCNC: 4 MMOL/L — SIGNIFICANT CHANGE UP (ref 3.5–5.3)
PROT SERPL-MCNC: 7.1 G/DL — SIGNIFICANT CHANGE UP (ref 6–8.3)
SODIUM SERPL-SCNC: 136 MMOL/L — SIGNIFICANT CHANGE UP (ref 135–145)

## 2025-03-02 PROCEDURE — 99253 IP/OBS CNSLTJ NEW/EST LOW 45: CPT

## 2025-03-02 PROCEDURE — 99233 SBSQ HOSP IP/OBS HIGH 50: CPT

## 2025-03-02 PROCEDURE — 99231 SBSQ HOSP IP/OBS SF/LOW 25: CPT

## 2025-03-02 PROCEDURE — 95720 EEG PHY/QHP EA INCR W/VEEG: CPT

## 2025-03-02 RX ORDER — LACOSAMIDE 150 MG/1
100 TABLET, FILM COATED ORAL ONCE
Refills: 0 | Status: DISCONTINUED | OUTPATIENT
Start: 2025-03-02 | End: 2025-03-02

## 2025-03-02 RX ORDER — LACOSAMIDE 150 MG/1
200 TABLET, FILM COATED ORAL EVERY 12 HOURS
Refills: 0 | Status: DISCONTINUED | OUTPATIENT
Start: 2025-03-02 | End: 2025-03-07

## 2025-03-02 RX ADMIN — ERYTHROMYCIN 1 APPLICATION(S): 5 OINTMENT OPHTHALMIC at 10:24

## 2025-03-02 RX ADMIN — Medication 1 APPLICATION(S): at 03:22

## 2025-03-02 RX ADMIN — Medication 1 APPLICATION(S): at 10:25

## 2025-03-02 RX ADMIN — IPRATROPIUM BROMIDE AND ALBUTEROL SULFATE 3 MILLILITER(S): .5; 2.5 SOLUTION RESPIRATORY (INHALATION) at 10:24

## 2025-03-02 RX ADMIN — IPRATROPIUM BROMIDE AND ALBUTEROL SULFATE 3 MILLILITER(S): .5; 2.5 SOLUTION RESPIRATORY (INHALATION) at 22:02

## 2025-03-02 RX ADMIN — Medication 1 SPRAY(S): at 17:41

## 2025-03-02 RX ADMIN — Medication 150 MILLIGRAM(S): at 11:33

## 2025-03-02 RX ADMIN — IPRATROPIUM BROMIDE AND ALBUTEROL SULFATE 3 MILLILITER(S): .5; 2.5 SOLUTION RESPIRATORY (INHALATION) at 06:00

## 2025-03-02 RX ADMIN — IPRATROPIUM BROMIDE AND ALBUTEROL SULFATE 3 MILLILITER(S): .5; 2.5 SOLUTION RESPIRATORY (INHALATION) at 03:21

## 2025-03-02 RX ADMIN — Medication 1 APPLICATION(S): at 14:04

## 2025-03-02 RX ADMIN — FLUTICASONE PROPIONATE 1 SPRAY(S): 50 SPRAY, METERED NASAL at 06:06

## 2025-03-02 RX ADMIN — Medication 15 MILLILITER(S): at 05:58

## 2025-03-02 RX ADMIN — Medication 15 MILLILITER(S): at 17:26

## 2025-03-02 RX ADMIN — Medication 1 DROP(S): at 22:02

## 2025-03-02 RX ADMIN — POLYETHYLENE GLYCOL 3350 17 GRAM(S): 17 POWDER, FOR SOLUTION ORAL at 11:34

## 2025-03-02 RX ADMIN — Medication 1 DROP(S): at 05:59

## 2025-03-02 RX ADMIN — Medication 1 DROP(S): at 03:21

## 2025-03-02 RX ADMIN — Medication 650 MILLIGRAM(S): at 10:27

## 2025-03-02 RX ADMIN — LACOSAMIDE 120 MILLIGRAM(S): 150 TABLET, FILM COATED ORAL at 11:34

## 2025-03-02 RX ADMIN — LEVETIRACETAM 1500 MILLIGRAM(S): 10 INJECTION, SOLUTION INTRAVENOUS at 17:27

## 2025-03-02 RX ADMIN — DOXAZOSIN MESYLATE 4 MILLIGRAM(S): 8 TABLET ORAL at 22:02

## 2025-03-02 RX ADMIN — METOPROLOL SUCCINATE 12.5 MILLIGRAM(S): 50 TABLET, EXTENDED RELEASE ORAL at 17:27

## 2025-03-02 RX ADMIN — LEVETIRACETAM 1500 MILLIGRAM(S): 10 INJECTION, SOLUTION INTRAVENOUS at 05:59

## 2025-03-02 RX ADMIN — ERYTHROMYCIN 1 APPLICATION(S): 5 OINTMENT OPHTHALMIC at 06:06

## 2025-03-02 RX ADMIN — Medication 1 MILLIGRAM(S): at 17:27

## 2025-03-02 RX ADMIN — IPRATROPIUM BROMIDE AND ALBUTEROL SULFATE 3 MILLILITER(S): .5; 2.5 SOLUTION RESPIRATORY (INHALATION) at 17:29

## 2025-03-02 RX ADMIN — ERYTHROMYCIN 1 APPLICATION(S): 5 OINTMENT OPHTHALMIC at 14:02

## 2025-03-02 RX ADMIN — ERYTHROMYCIN 1 APPLICATION(S): 5 OINTMENT OPHTHALMIC at 17:35

## 2025-03-02 RX ADMIN — Medication 40 MILLIGRAM(S): at 11:34

## 2025-03-02 RX ADMIN — ERYTHROMYCIN 1 APPLICATION(S): 5 OINTMENT OPHTHALMIC at 22:02

## 2025-03-02 RX ADMIN — Medication 1 DROP(S): at 10:24

## 2025-03-02 RX ADMIN — HEPARIN SODIUM 5000 UNIT(S): 1000 INJECTION INTRAVENOUS; SUBCUTANEOUS at 22:01

## 2025-03-02 RX ADMIN — ERYTHROMYCIN 1 APPLICATION(S): 5 OINTMENT OPHTHALMIC at 03:22

## 2025-03-02 RX ADMIN — Medication 1 MILLIGRAM(S): at 06:09

## 2025-03-02 RX ADMIN — Medication 1 APPLICATION(S): at 22:13

## 2025-03-02 RX ADMIN — Medication 650 MILLIGRAM(S): at 11:51

## 2025-03-02 RX ADMIN — Medication 1 DROP(S): at 14:02

## 2025-03-02 RX ADMIN — Medication 1 APPLICATION(S): at 17:41

## 2025-03-02 RX ADMIN — Medication 1 DROP(S): at 17:29

## 2025-03-02 RX ADMIN — HEPARIN SODIUM 5000 UNIT(S): 1000 INJECTION INTRAVENOUS; SUBCUTANEOUS at 14:02

## 2025-03-02 RX ADMIN — Medication 1 APPLICATION(S): at 06:07

## 2025-03-02 RX ADMIN — LACOSAMIDE 120 MILLIGRAM(S): 150 TABLET, FILM COATED ORAL at 17:31

## 2025-03-02 RX ADMIN — HEPARIN SODIUM 5000 UNIT(S): 1000 INJECTION INTRAVENOUS; SUBCUTANEOUS at 05:59

## 2025-03-02 RX ADMIN — Medication 1 SPRAY(S): at 06:06

## 2025-03-02 RX ADMIN — IPRATROPIUM BROMIDE AND ALBUTEROL SULFATE 3 MILLILITER(S): .5; 2.5 SOLUTION RESPIRATORY (INHALATION) at 14:02

## 2025-03-02 RX ADMIN — Medication 200 MILLIGRAM(S): at 20:34

## 2025-03-02 RX ADMIN — LACOSAMIDE 140 MILLIGRAM(S): 150 TABLET, FILM COATED ORAL at 23:34

## 2025-03-02 RX ADMIN — METOPROLOL SUCCINATE 12.5 MILLIGRAM(S): 50 TABLET, EXTENDED RELEASE ORAL at 05:59

## 2025-03-02 RX ADMIN — FLUTICASONE PROPIONATE 1 SPRAY(S): 50 SPRAY, METERED NASAL at 17:42

## 2025-03-02 RX ADMIN — Medication 150 MILLIGRAM(S): at 03:21

## 2025-03-02 NOTE — PROGRESS NOTE ADULT - ASSESSMENT
44M with unclear PMH (?stroke, MI) who was found down at work, intubated for airway protection and found to have acute parenchymal hemorrhage within nabil with mass effect (+ acute/subacute right cerebellar infarct) in setting of hypertensive emergency, transferred to [facility] for further neurosurgical care. Hospital course c/b poor neurologic recovery s/p trach-PEG, AUR s/p gabriel, ANIKA on CKD c/b hyperkalemia, HAP s/p amp-sulbactam (EOT 10/23), K aerogenes bacteremia  treated with 2 weeks of Cefepime per ID , On 11/15 he became septic and was transferred to NSICU due to increased o2 requirements and needed Vent support , Trach Cultures + for Stenotrophomonas which was treated with 7 days of Bactrim per ID , has been weaned of Vent since 11/23 and is now on 10lts at 40% o2 through Trach Collar. Stepped up to the NSICU on 12/15 for hypoxia and tachycardia. Pt s/p  abx for 5 days. Pt now stepped down to 8Lach.    #AMS  -Pt appears to be back to baseline.  -Pt's Head CT w/o on 3/1 did not show any acute intracranial findings.  Multiple chronic intracranial findings appear unchanged  -Pt seen by Epilepsy who made adjustment in pt's AEDs as documented below.     # Pontine hemorrhage  # Encephalopathy due to Intracranial Bleed   - Acute parenchymal hemorrhage within nabil with mass effect (+ acute/subacute right cerebellar infarct) in setting of hypertensive emergency.   - MRI brain also demonstrated acute/subacute R cerebellar stroke.   - No Neurosurgical Interventions were performed   - Trach, Vent and PEG Dependent    # Seizures  - Continue Seizure precautions   - Continue Keppra and phenytoin  -Pt was seen by Epilepsy team on 3/1 and pt was loaded on Vimpat then placed on standing Vimpat 100mg BID   -Pt was placed on EEG which showed the pt having seizures; Will continue EEG and f/u Epilepsy recommendations     # Hypertension  - Amlodipine d/c'ed on 2/28   -Continue Doxazosin     # Acute on Chronic respiratory failure   - s/p percutaneous trach by pulm on 10/14/24 - Trach Exchanged by Pulmonary on 2/14/25 ; Pt completed abx tx course with Ertapenem EOT  2/23 per ID   - Pt's BAL on 2/13 growing Stenotrophomonas and Enterobacter Cloacae complex  - Continue vent support and chest PT.    # Neurogenic dysphagia.   - Pt s/p PEG placement by  surgery 10/21/24  - Continue TF per nutrition  - Continue aspiration precautions and elevation of HOB.    # Urinary retention  # BPH   - doxazosin 4mg qHS   - [place]/Cond[place] Catheter     # Functional quadriplegia in setting of brainstem hemorrhage  - decub precautions  - care per nursing protocol     # ANIKA on CKD stage III   - Pt's creatinine is 1.29 ; Baseline Creatinine 1.1  - Continue Free water via PEG @ 200q6h  -Will continue to monitor creatinine     # Anemia of Chronic Disease   -Will continue to monitor H/H stable ~10     # DVT prop  -Heparin SQ TID    # DISPO  -Awaiting Transfer to [facility]      55 minutes spent on total encounter. The necessity of the time spent during the encounter on this date of service was due to:    Review of hospital course, labs, vitals, medical records.  Bedside exam and interview of the patient  Discussed plan of care with primary team   Documenting the encounter.

## 2025-03-02 NOTE — CHART NOTE - NSCHARTNOTEFT_GEN_A_CORE
Pt was found to have 2 more seizures on EEG since 7AM this morning. Will need more AED coverage.    Recs:  - give Vimpat 100mg STAT IVPB now  - increase maintanence Vimpat dose to 200mg BID from tonight  - c/w same doses of keppra and Phenytoin    Recs discussed with Neurosurg team.

## 2025-03-02 NOTE — PROGRESS NOTE ADULT - ASSESSMENT
44 year-old-male with unclear PMH (?stroke, MI) who was found down at work, intubated for airway protection and found to have acute large parenchymal hemorrhage within nabil with mass effect (+ acute/subacute right cerebellar infarct) in setting of hypertensive emergency, and R cerebellar stroke transferred to [facility] for further neurosurgical care. Epilepsy called back today for concern of seizure contributing to worsening mental status as repeat CTH was negative. Previous vEEGs were notable for right quadrant anterior seizures and epileptiform potential. Patient has long standing intermittent facial twitching at times not correlated to seizures as per chart review. Yesterday, EEG showed electroclinic seizures in which his head was turning towards right side with arm extension and correlated with EEG. EEG got better after Vimpat loading and maintenence dose. The etiology of seizures recurrence is currently unclear. Phenytoin level on 3/1 was therapeutic and no signs of active infection. Would consider repeating MRI.    Recommendations:  - c/w VEEG for now  - c/w Vimpat 100mg BID  - Continue Keppra 1500mg Q12hrs   - Continue Phenytoin to 150/150/200mg   - please obtain EKG to check for any conduction abnormalities (KS, QT interval) as Vimpat was started yesterday  - repeat MRI brain w/wo when able to   - f/u keppra levels (drawn on 3/2)  - Continue Ativan 1 mg to q12h   - Maintain seizure/fall/aspiration precautions  - Rest of the management per primary team     Discussed with Attending 44 year-old-male with unclear PMH (?stroke, MI) who was found down at work, intubated for airway protection and found to have acute large parenchymal hemorrhage within nabil with mass effect (+ acute/subacute right cerebellar infarct) in setting of hypertensive emergency, and R cerebellar stroke transferred to [facility] for further neurosurgical care. Epilepsy called back on 3/1 for concern of seizure contributing to worsening mental status as repeat CTH was negative. Previous vEEGs were notable for right quadrant anterior seizures and epileptiform potential. Patient has long standing intermittent facial twitching at times not correlated to seizures as per chart review. Yesterday, EEG showed electroclinic seizures in which his head was turning towards right side with arm extension and correlated with EEG. EEG got better after Vimpat loading and maintenance dose. His mental status also improved today compared to yesterday. The etiology of seizures recurrence is currently unclear. Phenytoin level on 3/1 was therapeutic and no signs of active infection. Would consider repeating MRI.    Recommendations:  - c/w VEEG for now  - c/w Vimpat 100mg BID  - Continue Keppra 1500mg Q12hrs   - Continue Phenytoin to 150/150/200mg   - please obtain EKG to check for any conduction abnormalities (UT, QT interval) as Vimpat was started yesterday  - repeat MRI brain w/wo when able to   - f/u keppra levels (drawn on 3/2)  - Continue Ativan 1 mg to q12h   - Maintain seizure/fall/aspiration precautions  - Rest of the management per primary team     Discussed with Attending 44 year-old-male with unclear PMH (?stroke, MI) who was found down at work, intubated for airway protection and found to have acute large parenchymal hemorrhage within nabil with mass effect (+ acute/subacute right cerebellar infarct) in setting of hypertensive emergency, and R cerebellar stroke transferred to [facility] for further neurosurgical care. Epilepsy called back on 3/1 for concern of seizure contributing to worsening mental status as repeat CTH was negative. Previous vEEGs were notable for right quadrant anterior seizures and epileptiform potential. Patient has long standing intermittent facial twitching at times not correlated to seizures as per chart review.     Yesterday, EEG showed electroclinical seizures in which his head was turning towards right side with R arm extension and correlated with EEG findings of evolving rhythmic L frontotemporal delta. EEG imrpoved after Vimpat loading and maintenance dose. His mental status also improved today compared to yesterday. The etiology of seizures recurrence is currently unclear. Phenytoin level on 3/1 was therapeutic and no signs of active infection. Would consider repeating MRI.    Recommendations:  - c/w VEEG for now  - c/w Vimpat 100mg BID  - Continue Keppra 1500mg Q12hrs   - Continue Phenytoin to 150/150/200mg   - please obtain EKG to check for any conduction abnormalities (SD prolongation) as Vimpat was started yesterday  - repeat MRI brain w/wo c+ it primary team in agreement.  - f/u keppra levels (drawn on 3/2)  - Continue Ativan 1 mg to q12h   - Maintain seizure/fall/aspiration precautions  - Rest of the management per primary team     Discussed with Attending

## 2025-03-02 NOTE — PROGRESS NOTE ADULT - SUBJECTIVE AND OBJECTIVE BOX
Patient was seen and examined at bedside. Case discuss with the primary team. Pt was seen to have activity on EEG and pt was started on Vimpat. Pt w/o any other events overnight.     OBJECTIVE:  Vital Signs Last 24 Hrs  T(C): 36.3 (02 Mar 2025 08:54), Max: 36.9 (02 Mar 2025 04:40)  T(F): 97.3 (02 Mar 2025 08:54), Max: 98.5 (02 Mar 2025 04:40)  HR: 70 (02 Mar 2025 08:18) (66 - 77)  BP: 118/87 (02 Mar 2025 08:18) (114/75 - 133/93)  BP(mean): 98 (02 Mar 2025 08:18) (89 - 108)  RR: 16 (02 Mar 2025 08:18) (14 - 20)  SpO2: 98% (02 Mar 2025 08:18) (97% - 100%)    Parameters below as of 02 Mar 2025 08:18  Patient On (Oxygen Delivery Method): ventilator    O2 Concentration (%): 40      PHYSICAL EXAM:  Gen:  awake , in bed; Pt was on EEG     HEENT: trach in place   CV: RRR, +S1/S2  Pulm: adequate respiratory effort, no increase in work of breathing  Abd: soft, ND, Peg Tube  Skin: warm and dry,   Neuro: pt was more responsive to noxious stimuli this morning when compared to 3/1. Pt was opening his eyes spontaneously.        LABS:                        10.0   6.36  )-----------( 203      ( 01 Mar 2025 09:16 )             29.6     03-02    136  |  100  |  57[H]  ----------------------------<  121[H]  4.0   |  23  |  1.14    Ca    9.4      02 Mar 2025 05:30  Phos  3.9     03-01  Mg     2.3     03-01    TPro  7.1  /  Alb  3.6  /  TBili  <0.2  /  DBili  x   /  AST  12  /  ALT  15  /  AlkPhos  211[H]  03-02    LIVER FUNCTIONS - ( 02 Mar 2025 05:30 )  Alb: 3.6 g/dL / Pro: 7.1 g/dL / ALK PHOS: 211 U/L / ALT: 15 U/L / AST: 12 U/L / GGT: x           3/1 Head CT w/o: No acute intracranial findings.  Multiple chronic intracranial findings appear unchanged    3/2 EEG: Continuous moderate to severe generalized slowing suggestive of a similar degree of diffuse or multifocal  dysfunction.      Upon EEG connection at 6pm, 4 focal motor seizures within the hour captured manifesting with head jerking movement to the right and right arm stiffening. Seizure onset not entirely clear; however, evolution over the left frontotemporal areas were noted with rhythmic delta activity. Seizures subsided with AED adjustment; last electroclinical seizure was captured at 3:23 AM.      acetaminophen   Oral Liquid .. 650 milliGRAM(s) Oral every 6 hours PRN  albuterol/ipratropium for Nebulization 3 milliLiter(s) Nebulizer every 4 hours  artificial tears (preservative free) Ophthalmic Solution 1 Drop(s) Both EYES every 4 hours  chlorhexidine 0.12% Liquid 15 milliLiter(s) Oral Mucosa every 12 hours  doxazosin 4 milliGRAM(s) Oral at bedtime  erythromycin   Ointment 1 Application(s) Both EYES every 4 hours  fluticasone propionate 50 MICROgram(s)/spray Nasal Spray 1 Spray(s) Both Nostrils two times a day  heparin   Injectable 5000 Unit(s) SubCutaneous every 8 hours  influenza   Vaccine 0.5 milliLiter(s) IntraMuscular once  lacosamide IVPB 100 milliGRAM(s) IV Intermittent every 12 hours  levETIRAcetam 1500 milliGRAM(s) Oral every 12 hours  LORazepam     Tablet 1 milliGRAM(s) Oral every 12 hours  LORazepam   Injectable 2 milliGRAM(s) IV Push once PRN  metoprolol tartrate 12.5 milliGRAM(s) Oral every 12 hours  pantoprazole   Suspension 40 milliGRAM(s) Oral daily  petrolatum Ophthalmic Ointment 1 Application(s) Both EYES every 4 hours  phenytoin   Suspension 150 milliGRAM(s) Oral <User Schedule>  phenytoin   Suspension 200 milliGRAM(s) Oral <User Schedule>  polyethylene glycol 3350 17 Gram(s) Oral daily  senna 2 Tablet(s) Oral at bedtime  sodium chloride 0.65% Nasal 1 Spray(s) Both Nostrils two times a day

## 2025-03-02 NOTE — EEG REPORT - NS EEG TEXT BOX
[facility] Department of Neurology  Inpatient Continuous video-Electroencephalogram    Patient Name:	GARETT DELEON    :	1980  MRN:	[id number]    Study Start Date/Time:  3/1/2025, 5:36:36 PM  Study End Date/Time: in progress    Referred by: Dr. Randy D’Amico    Brief Clinical History:  GARETT DELEON is a 44 year old Male with complex neurosurgical history, pontine hemorrhage, seizures, decreased responsiveness; study performed to investigate for seizures or markers of epilepsy.    Diagnosis Code:   R56.9 convulsions/seizure  The live video was: unmonitored.    Pertinent Medications:  Keppra, Dilantin, Ativan    Acquisition Details:  Electroencephalography was acquired using a minimum of 21 channels on an [employer] system v 9.3.1 with electrode placement according to the standard International 10-20 system following ACNS (American Clinical Neurophysiology Society) guidelines for Long-Term Video EEG monitoring.  Anterior temporal T1 and T2 electrodes were utilized whenever possible.   The [employer] automated spike & seizure detections were all reviewed in detail, in addition to extensive portions of raw EEG.      Day 1: 3/1/2025 @ 5:36:36 PM to next morning @ 07:00 am  Background:  continuous, with predominantly theta frequencies.  Symmetry:  near continuous EMG artifact over the right frontotemporal areas obscures interpretation.  Posterior Dominant Rhythm:  absent.  Organization: Absent.  Voltage:  Low (most <20uV LBP Peak-Trough)  Variability: Yes. 		Reactivity: Unclear.  N2 sleep: Rudimentary but likely N2 transients present.  Spontaneous Activity:  No epileptiform discharges.  Periodic/rhythmic activity:  None  Events:  Upon EEG connection at 6pm, 4 focal motor seizures within the hour captured manifesting with head jerking movement to the right and right arm stiffening. Seizure onset not entirely clear; however, evolution over the left frontotemporal areas were noted with rhythmic delta activity. Seizures subsided with AED adjustment; last electroclinical seizure captured at 3:23 AM.  Provocations:  Hyperventilation and Photic stimulation: was not performed.    Daily Summary:    Continuous moderate to severe generalized slowing suggestive of a similar degree of diffuse or multifocal  dysfunction.      Upon EEG connection at 6pm, 4 focal motor seizures within the hour captured manifesting with head jerking movement to the right and right arm stiffening. Seizure onset not entirely clear; however, evolution over the left frontotemporal areas were noted with rhythmic delta activity. Seizures subsided with AED adjustment; last electroclinical seizure was captured at 3:23 AM.      Candy Ríos DO  Attending Neurologist, [facility]

## 2025-03-02 NOTE — PROGRESS NOTE ADULT - SUBJECTIVE AND OBJECTIVE BOX
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 opacification; acute otitis media vs sterile effusion, CTH 12/18: stable. CTH 1/21 stable, CTH 1/27 stable, CTH 3/1: stable   - MRI brain 10/12: parenchymal hemorrhage, acute/subacute R cerebellar stroke      CV:  - -160  - HTN/tachycardia: Metoprolol 12.5 mg BID   - echo (9/30) EF 75%, repeat 12/16: 57%    PULM:  - s/p trach exchange with pulm at bedside 2/14 to vent, AC/VC 40/400/14/6  - Secretions: duonebs/chest PT q4h  - CT chest 2/17: Near complete collapse b/l lower lobes. Patchy opacity within LLL/ALONDRA  - pulmonology consulted, recs appreciated    GI:  - TFs via PEG, [facility]  - bowel regimen, last BM 2/25    Renal:  - IVL  - FW 200q6 for hydration  - Voiding, SC prn  - CKD: trend BUN/Cr  - renal US 10/1, 10/8, 1/15: Increased bilateral renal echogenicity consistent with medical renal disease  - urinary retention: doxazosin 4mg at bedtime     Endo:  - A1c 5.4    Heme:  - DVT ppx: SCDs, SQH 5000u q8h   - LE dopplers negative 12/16    ID:  - sputum 2/16 +Enterobacter cloacae: s/p Ertapenem (2/19-23) per ID recs, likely conolonized with stenotrophomonas  - +klebsiella UTI s/p levaquin (1/30-2/3)  - empiric PNA: cefepime (12/22-12/30), s/p vanc (12/22-12/23), s/p zosyn (12/15-12/20), s/p vanc (12/15-12/16), s/p zosyn (1/12 -1/18), s/p DS bactrim 2 tabs TID, 200 mg minocycline BID (2/18)  - +klebsiella UTI s/p   - s/p Stenotrophomonas maltophilia PNA: s/p Bactrim (11/18-11/19) s/p Cefepime (11/16-11/18)  - 11/3, (+) sputum for stenotrophomas maltophlia, blood cx (+) klebsiella, cefepime 2gq12 (11/6 - 11/12)   - S/p Ancef (10/4-10/14) for PNA, and s/p Unasyn (10/18-10/23) +actinobacter baumanii     MISC:  - BL keratitis: BL erythromycin ointment, artificial tears, lacrilube q4, moisture chamber   - Penile wound: wound care rec barrier wipes     Dispo: SDU status, DNR, pending SNF    D/w Dr. D'Amico

## 2025-03-02 NOTE — PROGRESS NOTE ADULT - SUBJECTIVE AND OBJECTIVE BOX
Neurology Progress Note    Interval History:    Patient seen and examined today. No events overnight. VEEG was started yesterday late afternoon and clinical seizures were detected. Pt was loaded with Vimpat 200mg and started maintenance dose of 100mg BID. Phenytoin and Keppra trough level drawn.     PAST MEDICAL & SURGICAL HISTORY:  Acute myocardial infarction    CVA (cerebral vascular accident)            Medications:  acetaminophen   Oral Liquid .. 650 milliGRAM(s) Oral every 6 hours PRN  albuterol/ipratropium for Nebulization 3 milliLiter(s) Nebulizer every 4 hours  artificial tears (preservative free) Ophthalmic Solution 1 Drop(s) Both EYES every 4 hours  chlorhexidine 0.12% Liquid 15 milliLiter(s) Oral Mucosa every 12 hours  doxazosin 4 milliGRAM(s) Oral at bedtime  erythromycin   Ointment 1 Application(s) Both EYES every 4 hours  fluticasone propionate 50 MICROgram(s)/spray Nasal Spray 1 Spray(s) Both Nostrils two times a day  heparin   Injectable 5000 Unit(s) SubCutaneous every 8 hours  influenza   Vaccine 0.5 milliLiter(s) IntraMuscular once  lacosamide IVPB 100 milliGRAM(s) IV Intermittent every 12 hours  levETIRAcetam 1500 milliGRAM(s) Oral every 12 hours  LORazepam     Tablet 1 milliGRAM(s) Oral every 12 hours  LORazepam   Injectable 2 milliGRAM(s) IV Push once PRN  metoprolol tartrate 12.5 milliGRAM(s) Oral every 12 hours  pantoprazole   Suspension 40 milliGRAM(s) Oral daily  petrolatum Ophthalmic Ointment 1 Application(s) Both EYES every 4 hours  phenytoin   Suspension 150 milliGRAM(s) Oral <User Schedule>  phenytoin   Suspension 200 milliGRAM(s) Oral <User Schedule>  polyethylene glycol 3350 17 Gram(s) Oral daily  senna 2 Tablet(s) Oral at bedtime  sodium chloride 0.65% Nasal 1 Spray(s) Both Nostrils two times a day      Vital Signs Last 24 Hrs  T(C): 36.9 (02 Mar 2025 04:40), Max: 37.1 (01 Mar 2025 08:43)  T(F): 98.5 (02 Mar 2025 04:40), Max: 98.7 (01 Mar 2025 08:43)  HR: 70 (02 Mar 2025 06:39) (66 - 78)  BP: 133/93 (02 Mar 2025 06:00) (101/72 - 133/93)  BP(mean): 108 (02 Mar 2025 06:00) (83 - 108)  RR: 18 (02 Mar 2025 06:00) (14 - 20)  SpO2: 98% (02 Mar 2025 06:39) (97% - 100%)    Parameters below as of 02 Mar 2025 06:00  Patient On (Oxygen Delivery Method): ventilator    O2 Concentration (%): 40    Neurological Exam:   Mental status: Stupurous, awakens to painful stimuli but returns to sleep, unable to follow commands, diaphoretic  Eyes: Left eye conjunctival injection, pupils reactive to light, unable to track  Face: bilateral asynchonous facial twitch - left upper face and right lower face involved more apparently upon waking up  Motor: Unable to reliably asses given mental status. flaccid tone throughout. Stimulus induced myoclonus in b/l lower ext.   Sensation: Localizes to painful stimuli in b/l LE, and LUE  Coordination: Unable to asses   Reflexes: upgoing toes bilaterally.    Labs:  CBC Full  -  ( 01 Mar 2025 09:16 )  WBC Count : 6.36 K/uL  RBC Count : 3.14 M/uL  Hemoglobin : 10.0 g/dL  Hematocrit : 29.6 %  Platelet Count - Automated : 203 K/uL  Mean Cell Volume : 94.3 fl  Mean Cell Hemoglobin : 31.8 pg  Mean Cell Hemoglobin Concentration : 33.8 g/dL  Auto Neutrophil # : x  Auto Lymphocyte # : x  Auto Monocyte # : x  Auto Eosinophil # : x  Auto Basophil # : x  Auto Neutrophil % : x  Auto Lymphocyte % : x  Auto Monocyte % : x  Auto Eosinophil % : x  Auto Basophil % : x    03-01    136  |  97  |  59[H]  ----------------------------<  89  4.0   |  25  |  1.29    Ca    9.5      01 Mar 2025 09:16  Phos  3.9     03-01  Mg     2.3     03-01          Urinalysis Basic - ( 01 Mar 2025 09:16 )    Color: x / Appearance: x / SG: x / pH: x  Gluc: 89 mg/dL / Ketone: x  / Bili: x / Urobili: x   Blood: x / Protein: x / Nitrite: x   Leuk Esterase: x / RBC: x / WBC x   Sq Epi: x / Non Sq Epi: x / Bacteria: x        Radiology: Reviewed    EEG:  Epilepsy Progress Note    Interval History:     ID: [id number]. Patient seen and examined today. No events overnight. VEEG was started yesterday late afternoon and clinical seizures were detected. Pt was loaded with Vimpat 200mg and started maintenance dose of 100mg BID. Phenytoin and Keppra trough level drawn.     PAST MEDICAL & SURGICAL HISTORY:  Acute myocardial infarction    CVA (cerebral vascular accident)            Medications:  acetaminophen   Oral Liquid .. 650 milliGRAM(s) Oral every 6 hours PRN  albuterol/ipratropium for Nebulization 3 milliLiter(s) Nebulizer every 4 hours  artificial tears (preservative free) Ophthalmic Solution 1 Drop(s) Both EYES every 4 hours  chlorhexidine 0.12% Liquid 15 milliLiter(s) Oral Mucosa every 12 hours  doxazosin 4 milliGRAM(s) Oral at bedtime  erythromycin   Ointment 1 Application(s) Both EYES every 4 hours  fluticasone propionate 50 MICROgram(s)/spray Nasal Spray 1 Spray(s) Both Nostrils two times a day  heparin   Injectable 5000 Unit(s) SubCutaneous every 8 hours  influenza   Vaccine 0.5 milliLiter(s) IntraMuscular once  lacosamide IVPB 100 milliGRAM(s) IV Intermittent every 12 hours  levETIRAcetam 1500 milliGRAM(s) Oral every 12 hours  LORazepam     Tablet 1 milliGRAM(s) Oral every 12 hours  LORazepam   Injectable 2 milliGRAM(s) IV Push once PRN  metoprolol tartrate 12.5 milliGRAM(s) Oral every 12 hours  pantoprazole   Suspension 40 milliGRAM(s) Oral daily  petrolatum Ophthalmic Ointment 1 Application(s) Both EYES every 4 hours  phenytoin   Suspension 150 milliGRAM(s) Oral <User Schedule>  phenytoin   Suspension 200 milliGRAM(s) Oral <User Schedule>  polyethylene glycol 3350 17 Gram(s) Oral daily  senna 2 Tablet(s) Oral at bedtime  sodium chloride 0.65% Nasal 1 Spray(s) Both Nostrils two times a day      Vital Signs Last 24 Hrs  T(C): 36.9 (02 Mar 2025 04:40), Max: 37.1 (01 Mar 2025 08:43)  T(F): 98.5 (02 Mar 2025 04:40), Max: 98.7 (01 Mar 2025 08:43)  HR: 70 (02 Mar 2025 06:39) (66 - 78)  BP: 133/93 (02 Mar 2025 06:00) (101/72 - 133/93)  BP(mean): 108 (02 Mar 2025 06:00) (83 - 108)  RR: 18 (02 Mar 2025 06:00) (14 - 20)  SpO2: 98% (02 Mar 2025 06:39) (97% - 100%)    Parameters below as of 02 Mar 2025 06:00  Patient On (Oxygen Delivery Method): ventilator    O2 Concentration (%): 40    Neurological Exam in the morning:  Mental status: awake, able to follow some commands like squeezing finger and wiggling toes  Eyes: Left eye conjunctival injection, pupils reactive to light, unable to track  Face: R lower facial twitching seen  Motor: able to make a fist on Right hand, no movement on LUE, no spontaneous movement in b/l LE; can wiggle R toes on command occasionally. Stimulus induced myoclonus in b/l lower ext.   Sensation: Localizes to painful stimuli in b/l LE, and LUE  Coordination: Unable to asses   Reflexes: mute babinski    Labs:  CBC Full  -  ( 01 Mar 2025 09:16 )  WBC Count : 6.36 K/uL  RBC Count : 3.14 M/uL  Hemoglobin : 10.0 g/dL  Hematocrit : 29.6 %  Platelet Count - Automated : 203 K/uL  Mean Cell Volume : 94.3 fl  Mean Cell Hemoglobin : 31.8 pg  Mean Cell Hemoglobin Concentration : 33.8 g/dL  Auto Neutrophil # : x  Auto Lymphocyte # : x  Auto Monocyte # : x  Auto Eosinophil # : x  Auto Basophil # : x  Auto Neutrophil % : x  Auto Lymphocyte % : x  Auto Monocyte % : x  Auto Eosinophil % : x  Auto Basophil % : x    03-01    136  |  97  |  59[H]  ----------------------------<  89  4.0   |  25  |  1.29    Ca    9.5      01 Mar 2025 09:16  Phos  3.9     03-01  Mg     2.3     03-01          Urinalysis Basic - ( 01 Mar 2025 09:16 )    Color: x / Appearance: x / SG: x / pH: x  Gluc: 89 mg/dL / Ketone: x  / Bili: x / Urobili: x   Blood: x / Protein: x / Nitrite: x   Leuk Esterase: x / RBC: x / WBC x   Sq Epi: x / Non Sq Epi: x / Bacteria: x        Radiology: Reviewed    EEG:   Daily Summary:    Continuous moderate to severe generalized slowing suggestive of a similar degree of diffuse or multifocal  dysfunction.      Upon EEG connection at 6pm, 4 focal motor seizures within the hour captured manifesting with head jerking movement to the right and right arm stiffening. Seizure onset not entirely clear; however, evolution over the left frontotemporal areas were noted with rhythmic delta activity. Seizures subsided with AED adjustment; last electroclinical seizure was captured at 3:23 AM.

## 2025-03-03 LAB
ANION GAP SERPL CALC-SCNC: 12 MMOL/L — SIGNIFICANT CHANGE UP (ref 5–17)
BUN SERPL-MCNC: 56 MG/DL — HIGH (ref 7–23)
CALCIUM SERPL-MCNC: 9.3 MG/DL — SIGNIFICANT CHANGE UP (ref 8.4–10.5)
CHLORIDE SERPL-SCNC: 101 MMOL/L — SIGNIFICANT CHANGE UP (ref 96–108)
CO2 SERPL-SCNC: 25 MMOL/L — SIGNIFICANT CHANGE UP (ref 22–31)
CREAT SERPL-MCNC: 1.12 MG/DL — SIGNIFICANT CHANGE UP (ref 0.5–1.3)
EGFR: 82 ML/MIN/1.73M2 — SIGNIFICANT CHANGE UP
EGFR: 82 ML/MIN/1.73M2 — SIGNIFICANT CHANGE UP
GLUCOSE SERPL-MCNC: 138 MG/DL — HIGH (ref 70–99)
HCT VFR BLD CALC: 31.1 % — LOW (ref 39–50)
HGB BLD-MCNC: 10.1 G/DL — LOW (ref 13–17)
MAGNESIUM SERPL-MCNC: 2.5 MG/DL — SIGNIFICANT CHANGE UP (ref 1.6–2.6)
MCHC RBC-ENTMCNC: 31.6 PG — SIGNIFICANT CHANGE UP (ref 27–34)
MCHC RBC-ENTMCNC: 32.5 G/DL — SIGNIFICANT CHANGE UP (ref 32–36)
MCV RBC AUTO: 97.2 FL — SIGNIFICANT CHANGE UP (ref 80–100)
NRBC BLD AUTO-RTO: 0 /100 WBCS — SIGNIFICANT CHANGE UP (ref 0–0)
PHOSPHATE SERPL-MCNC: 3.5 MG/DL — SIGNIFICANT CHANGE UP (ref 2.5–4.5)
PLATELET # BLD AUTO: 237 K/UL — SIGNIFICANT CHANGE UP (ref 150–400)
POTASSIUM SERPL-MCNC: 4 MMOL/L — SIGNIFICANT CHANGE UP (ref 3.5–5.3)
POTASSIUM SERPL-SCNC: 4 MMOL/L — SIGNIFICANT CHANGE UP (ref 3.5–5.3)
RBC # BLD: 3.2 M/UL — LOW (ref 4.2–5.8)
RBC # FLD: 12.4 % — SIGNIFICANT CHANGE UP (ref 10.3–14.5)
SODIUM SERPL-SCNC: 138 MMOL/L — SIGNIFICANT CHANGE UP (ref 135–145)
WBC # BLD: 6.89 K/UL — SIGNIFICANT CHANGE UP (ref 3.8–10.5)
WBC # FLD AUTO: 6.89 K/UL — SIGNIFICANT CHANGE UP (ref 3.8–10.5)

## 2025-03-03 PROCEDURE — 99232 SBSQ HOSP IP/OBS MODERATE 35: CPT

## 2025-03-03 PROCEDURE — 93010 ELECTROCARDIOGRAM REPORT: CPT

## 2025-03-03 PROCEDURE — 99233 SBSQ HOSP IP/OBS HIGH 50: CPT

## 2025-03-03 PROCEDURE — 95720 EEG PHY/QHP EA INCR W/VEEG: CPT

## 2025-03-03 RX ORDER — GABAPENTIN 400 MG/1
300 CAPSULE ORAL EVERY 8 HOURS
Refills: 0 | Status: DISCONTINUED | OUTPATIENT
Start: 2025-03-03 | End: 2025-03-10

## 2025-03-03 RX ADMIN — POLYETHYLENE GLYCOL 3350 17 GRAM(S): 17 POWDER, FOR SOLUTION ORAL at 11:46

## 2025-03-03 RX ADMIN — ERYTHROMYCIN 1 APPLICATION(S): 5 OINTMENT OPHTHALMIC at 10:02

## 2025-03-03 RX ADMIN — Medication 1 APPLICATION(S): at 05:50

## 2025-03-03 RX ADMIN — Medication 150 MILLIGRAM(S): at 03:10

## 2025-03-03 RX ADMIN — Medication 1 SPRAY(S): at 17:29

## 2025-03-03 RX ADMIN — DOXAZOSIN MESYLATE 4 MILLIGRAM(S): 8 TABLET ORAL at 22:33

## 2025-03-03 RX ADMIN — IPRATROPIUM BROMIDE AND ALBUTEROL SULFATE 3 MILLILITER(S): .5; 2.5 SOLUTION RESPIRATORY (INHALATION) at 17:29

## 2025-03-03 RX ADMIN — IPRATROPIUM BROMIDE AND ALBUTEROL SULFATE 3 MILLILITER(S): .5; 2.5 SOLUTION RESPIRATORY (INHALATION) at 02:05

## 2025-03-03 RX ADMIN — LACOSAMIDE 140 MILLIGRAM(S): 150 TABLET, FILM COATED ORAL at 11:33

## 2025-03-03 RX ADMIN — HEPARIN SODIUM 5000 UNIT(S): 1000 INJECTION INTRAVENOUS; SUBCUTANEOUS at 22:34

## 2025-03-03 RX ADMIN — ERYTHROMYCIN 1 APPLICATION(S): 5 OINTMENT OPHTHALMIC at 22:36

## 2025-03-03 RX ADMIN — Medication 1 DROP(S): at 10:02

## 2025-03-03 RX ADMIN — GABAPENTIN 300 MILLIGRAM(S): 400 CAPSULE ORAL at 22:31

## 2025-03-03 RX ADMIN — IPRATROPIUM BROMIDE AND ALBUTEROL SULFATE 3 MILLILITER(S): .5; 2.5 SOLUTION RESPIRATORY (INHALATION) at 05:44

## 2025-03-03 RX ADMIN — IPRATROPIUM BROMIDE AND ALBUTEROL SULFATE 3 MILLILITER(S): .5; 2.5 SOLUTION RESPIRATORY (INHALATION) at 13:40

## 2025-03-03 RX ADMIN — FLUTICASONE PROPIONATE 1 SPRAY(S): 50 SPRAY, METERED NASAL at 05:47

## 2025-03-03 RX ADMIN — Medication 1 DROP(S): at 02:08

## 2025-03-03 RX ADMIN — Medication 2 TABLET(S): at 22:32

## 2025-03-03 RX ADMIN — METOPROLOL SUCCINATE 12.5 MILLIGRAM(S): 50 TABLET, EXTENDED RELEASE ORAL at 05:43

## 2025-03-03 RX ADMIN — IPRATROPIUM BROMIDE AND ALBUTEROL SULFATE 3 MILLILITER(S): .5; 2.5 SOLUTION RESPIRATORY (INHALATION) at 10:02

## 2025-03-03 RX ADMIN — ERYTHROMYCIN 1 APPLICATION(S): 5 OINTMENT OPHTHALMIC at 02:05

## 2025-03-03 RX ADMIN — Medication 1 APPLICATION(S): at 10:03

## 2025-03-03 RX ADMIN — HEPARIN SODIUM 5000 UNIT(S): 1000 INJECTION INTRAVENOUS; SUBCUTANEOUS at 13:39

## 2025-03-03 RX ADMIN — Medication 15 MILLILITER(S): at 05:45

## 2025-03-03 RX ADMIN — ERYTHROMYCIN 1 APPLICATION(S): 5 OINTMENT OPHTHALMIC at 17:28

## 2025-03-03 RX ADMIN — Medication 200 MILLIGRAM(S): at 19:04

## 2025-03-03 RX ADMIN — Medication 1 APPLICATION(S): at 13:40

## 2025-03-03 RX ADMIN — ERYTHROMYCIN 1 APPLICATION(S): 5 OINTMENT OPHTHALMIC at 05:45

## 2025-03-03 RX ADMIN — Medication 150 MILLIGRAM(S): at 11:46

## 2025-03-03 RX ADMIN — METOPROLOL SUCCINATE 12.5 MILLIGRAM(S): 50 TABLET, EXTENDED RELEASE ORAL at 17:30

## 2025-03-03 RX ADMIN — LACOSAMIDE 140 MILLIGRAM(S): 150 TABLET, FILM COATED ORAL at 22:52

## 2025-03-03 RX ADMIN — Medication 1 APPLICATION(S): at 02:09

## 2025-03-03 RX ADMIN — GABAPENTIN 300 MILLIGRAM(S): 400 CAPSULE ORAL at 14:07

## 2025-03-03 RX ADMIN — LEVETIRACETAM 1500 MILLIGRAM(S): 10 INJECTION, SOLUTION INTRAVENOUS at 05:44

## 2025-03-03 RX ADMIN — HEPARIN SODIUM 5000 UNIT(S): 1000 INJECTION INTRAVENOUS; SUBCUTANEOUS at 05:47

## 2025-03-03 RX ADMIN — Medication 1 DROP(S): at 22:31

## 2025-03-03 RX ADMIN — Medication 1 MILLIGRAM(S): at 05:43

## 2025-03-03 RX ADMIN — Medication 1 DROP(S): at 13:40

## 2025-03-03 RX ADMIN — FLUTICASONE PROPIONATE 1 SPRAY(S): 50 SPRAY, METERED NASAL at 17:29

## 2025-03-03 RX ADMIN — Medication 1 MILLIGRAM(S): at 17:30

## 2025-03-03 RX ADMIN — Medication 1 APPLICATION(S): at 22:36

## 2025-03-03 RX ADMIN — Medication 40 MILLIGRAM(S): at 11:46

## 2025-03-03 RX ADMIN — Medication 1 DROP(S): at 17:28

## 2025-03-03 RX ADMIN — Medication 1 SPRAY(S): at 05:47

## 2025-03-03 RX ADMIN — ERYTHROMYCIN 1 APPLICATION(S): 5 OINTMENT OPHTHALMIC at 13:40

## 2025-03-03 RX ADMIN — IPRATROPIUM BROMIDE AND ALBUTEROL SULFATE 3 MILLILITER(S): .5; 2.5 SOLUTION RESPIRATORY (INHALATION) at 22:31

## 2025-03-03 RX ADMIN — Medication 15 MILLILITER(S): at 17:29

## 2025-03-03 RX ADMIN — Medication 1 DROP(S): at 05:46

## 2025-03-03 RX ADMIN — Medication 1 APPLICATION(S): at 17:28

## 2025-03-03 RX ADMIN — LEVETIRACETAM 1500 MILLIGRAM(S): 10 INJECTION, SOLUTION INTRAVENOUS at 17:30

## 2025-03-03 NOTE — PROGRESS NOTE ADULT - SUBJECTIVE AND OBJECTIVE BOX
Neurology Progress Note    Interval History:            PAST MEDICAL & SURGICAL HISTORY:  Acute myocardial infarction    CVA (cerebral vascular accident)            Medications:  acetaminophen   Oral Liquid .. 650 milliGRAM(s) Oral every 6 hours PRN  albuterol/ipratropium for Nebulization 3 milliLiter(s) Nebulizer every 4 hours  artificial tears (preservative free) Ophthalmic Solution 1 Drop(s) Both EYES every 4 hours  chlorhexidine 0.12% Liquid 15 milliLiter(s) Oral Mucosa every 12 hours  doxazosin 4 milliGRAM(s) Oral at bedtime  erythromycin   Ointment 1 Application(s) Both EYES every 4 hours  fluticasone propionate 50 MICROgram(s)/spray Nasal Spray 1 Spray(s) Both Nostrils two times a day  heparin   Injectable 5000 Unit(s) SubCutaneous every 8 hours  influenza   Vaccine 0.5 milliLiter(s) IntraMuscular once  lacosamide IVPB 200 milliGRAM(s) IV Intermittent every 12 hours  levETIRAcetam 1500 milliGRAM(s) Oral every 12 hours  LORazepam     Tablet 1 milliGRAM(s) Oral every 12 hours  LORazepam   Injectable 2 milliGRAM(s) IV Push once PRN  metoprolol tartrate 12.5 milliGRAM(s) Oral every 12 hours  pantoprazole   Suspension 40 milliGRAM(s) Oral daily  petrolatum Ophthalmic Ointment 1 Application(s) Both EYES every 4 hours  phenytoin   Suspension 150 milliGRAM(s) Oral <User Schedule>  phenytoin   Suspension 200 milliGRAM(s) Oral <User Schedule>  polyethylene glycol 3350 17 Gram(s) Oral daily  senna 2 Tablet(s) Oral at bedtime  sodium chloride 0.65% Nasal 1 Spray(s) Both Nostrils two times a day      Vital Signs Last 24 Hrs  T(C): 36.8 (03 Mar 2025 04:33), Max: 37.1 (02 Mar 2025 21:41)  T(F): 98.3 (03 Mar 2025 04:33), Max: 98.8 (02 Mar 2025 21:41)  HR: 90 (03 Mar 2025 06:25) (70 - 94)  BP: 108/71 (03 Mar 2025 05:33) (108/70 - 125/84)  BP(mean): 84 (03 Mar 2025 05:33) (84 - 101)  RR: 15 (03 Mar 2025 06:25) (14 - 16)  SpO2: 95% (03 Mar 2025 05:33) (93% - 99%)    Parameters below as of 03 Mar 2025 06:25  Patient On (Oxygen Delivery Method): ventilator    O2 Concentration (%): 40    Neurological Exam:   Mental status: Awake, alert and oriented x3.  Recent and remote memory intact.  Naming, repetition and comprehension intact.  Attention/concentration intact.  No dysarthria, no aphasia.  Fund of knowledge appropriate.    Cranial nerves: Pupils equally round and reactive to light, visual fields full, no nystagmus, extraocular muscles intact, V1 through V3 intact bilaterally and symmetric, face symmetric, hearing intact to finger rub, palate elevation symmetric, tongue was midline.  Motor:  MRC grading 5/5 b/l UE/LE.   strength 5/5.  Normal tone and bulk.  No abnormal movements.    Sensation: Intact to light touch, proprioception, and pinprick.   Coordination: No dysmetria on finger-to-nose and heel-to-shin.  No dysdiadokinesia.  Reflexes: 2+ in bilateral UE/LE, downgoing toes bilaterally. (-) Fabian.  Gait: Narrow and steady. No ataxia.  Romberg negative    Labs:  CBC Full  -  ( 03 Mar 2025 05:30 )  WBC Count : 6.89 K/uL  RBC Count : 3.20 M/uL  Hemoglobin : 10.1 g/dL  Hematocrit : 31.1 %  Platelet Count - Automated : 237 K/uL  Mean Cell Volume : 97.2 fl  Mean Cell Hemoglobin : 31.6 pg  Mean Cell Hemoglobin Concentration : 32.5 g/dL  Auto Neutrophil # : x  Auto Lymphocyte # : x  Auto Monocyte # : x  Auto Eosinophil # : x  Auto Basophil # : x  Auto Neutrophil % : x  Auto Lymphocyte % : x  Auto Monocyte % : x  Auto Eosinophil % : x  Auto Basophil % : x    03-02    136  |  100  |  57[H]  ----------------------------<  121[H]  4.0   |  23  |  1.14    Ca    9.4      02 Mar 2025 05:30  Phos  3.9     03-01  Mg     2.3     03-01    TPro  7.1  /  Alb  3.6  /  TBili  <0.2  /  DBili  x   /  AST  12  /  ALT  15  /  AlkPhos  211[H]  03-02    LIVER FUNCTIONS - ( 02 Mar 2025 05:30 )  Alb: 3.6 g/dL / Pro: 7.1 g/dL / ALK PHOS: 211 U/L / ALT: 15 U/L / AST: 12 U/L / GGT: x             Urinalysis Basic - ( 02 Mar 2025 05:30 )    Color: x / Appearance: x / SG: x / pH: x  Gluc: 121 mg/dL / Ketone: x  / Bili: x / Urobili: x   Blood: x / Protein: x / Nitrite: x   Leuk Esterase: x / RBC: x / WBC x   Sq Epi: x / Non Sq Epi: x / Bacteria: x     Neurology Progress Note    Interval History:  Pt seen and examined at the bedside today. Overnight had two seizure episodes noted on VEEG.      PAST MEDICAL & SURGICAL HISTORY:  Acute myocardial infarction    CVA (cerebral vascular accident)      Medications:  acetaminophen   Oral Liquid .. 650 milliGRAM(s) Oral every 6 hours PRN  albuterol/ipratropium for Nebulization 3 milliLiter(s) Nebulizer every 4 hours  artificial tears (preservative free) Ophthalmic Solution 1 Drop(s) Both EYES every 4 hours  chlorhexidine 0.12% Liquid 15 milliLiter(s) Oral Mucosa every 12 hours  doxazosin 4 milliGRAM(s) Oral at bedtime  erythromycin   Ointment 1 Application(s) Both EYES every 4 hours  fluticasone propionate 50 MICROgram(s)/spray Nasal Spray 1 Spray(s) Both Nostrils two times a day  heparin   Injectable 5000 Unit(s) SubCutaneous every 8 hours  influenza   Vaccine 0.5 milliLiter(s) IntraMuscular once  lacosamide IVPB 200 milliGRAM(s) IV Intermittent every 12 hours  levETIRAcetam 1500 milliGRAM(s) Oral every 12 hours  LORazepam     Tablet 1 milliGRAM(s) Oral every 12 hours  LORazepam   Injectable 2 milliGRAM(s) IV Push once PRN  metoprolol tartrate 12.5 milliGRAM(s) Oral every 12 hours  pantoprazole   Suspension 40 milliGRAM(s) Oral daily  petrolatum Ophthalmic Ointment 1 Application(s) Both EYES every 4 hours  phenytoin   Suspension 150 milliGRAM(s) Oral <User Schedule>  phenytoin   Suspension 200 milliGRAM(s) Oral <User Schedule>  polyethylene glycol 3350 17 Gram(s) Oral daily  senna 2 Tablet(s) Oral at bedtime  sodium chloride 0.65% Nasal 1 Spray(s) Both Nostrils two times a day      Vital Signs Last 24 Hrs  T(C): 36.8 (03 Mar 2025 04:33), Max: 37.1 (02 Mar 2025 21:41)  T(F): 98.3 (03 Mar 2025 04:33), Max: 98.8 (02 Mar 2025 21:41)  HR: 94 (03 Mar 2025 08:25) (78 - 94)  BP: 109/77 (03 Mar 2025 08:25) (108/70 - 125/84)  BP(mean): 90 (03 Mar 2025 08:25) (84 - 101)  ABP: --  ABP(mean): --  RR: 16 (03 Mar 2025 08:25) (14 - 16)  SpO2: 97% (03 Mar 2025 08:25) (93% - 99%)    O2 Parameters below as of 03 Mar 2025 08:25  Patient On (Oxygen Delivery Method): ventilator    O2 Concentration (%): 40    Neurological Exam in the morning:  Mental status: awake, able to follow some commands like squeezing finger  Eyes: Left eye conjunctival injection and ptosis, eye is abducted and depressed at rest. Right eye vertical nystagmus. Pupils are reactive to light, right eye is more sluggish to constriction than the left. Unable to track movement.   Face: R lower rhythmic synchronous facial twitching present  Motor: Flaccid tone in both upper extremities and lower extremities (0/5). Right hand able to squeeze fingers lightly (1/5). Rhythmic twitching noted of the distal left arm and distal right leg. Stimulus induced myoclonus in right lower ext.   Sensation: Localizes to painful stimuli in RUE and b/l LE.  Coordination: Unable to asses   Reflexes: mute babinski. Hyperreflexive in RLE 4+/5, 3+ in all other areas. Toes  mute  Cerebellar signs unable to assess.   Gait: Deferred    Labs:  CBC Full  -  ( 03 Mar 2025 05:30 )  WBC Count : 6.89 K/uL  RBC Count : 3.20 M/uL  Hemoglobin : 10.1 g/dL  Hematocrit : 31.1 %  Platelet Count - Automated : 237 K/uL  Mean Cell Volume : 97.2 fl  Mean Cell Hemoglobin : 31.6 pg  Mean Cell Hemoglobin Concentration : 32.5 g/dL  Auto Neutrophil # : x  Auto Lymphocyte # : x  Auto Monocyte # : x  Auto Eosinophil # : x  Auto Basophil # : x  Auto Neutrophil % : x  Auto Lymphocyte % : x  Auto Monocyte % : x  Auto Eosinophil % : x  Auto Basophil % : x    03-02    136  |  100  |  57[H]  ----------------------------<  121[H]  4.0   |  23  |  1.14    Ca    9.4      02 Mar 2025 05:30  Phos  3.9     03-01  Mg     2.3     03-01    TPro  7.1  /  Alb  3.6  /  TBili  <0.2  /  DBili  x   /  AST  12  /  ALT  15  /  AlkPhos  211[H]  03-02    LIVER FUNCTIONS - ( 02 Mar 2025 05:30 )  Alb: 3.6 g/dL / Pro: 7.1 g/dL / ALK PHOS: 211 U/L / ALT: 15 U/L / AST: 12 U/L / GGT: x             Urinalysis Basic - ( 02 Mar 2025 05:30 )    Color: x / Appearance: x / SG: x / pH: x  Gluc: 121 mg/dL / Ketone: x  / Bili: x / Urobili: x   Blood: x / Protein: x / Nitrite: x   Leuk Esterase: x / RBC: x / WBC x   Sq Epi: x / Non Sq Epi: x / Bacteria: x     Neurology Progress Note    Interval History:  Pt seen and examined at the bedside today.    PAST MEDICAL & SURGICAL HISTORY:  Acute myocardial infarction    CVA (cerebral vascular accident)      Medications:  acetaminophen   Oral Liquid .. 650 milliGRAM(s) Oral every 6 hours PRN  albuterol/ipratropium for Nebulization 3 milliLiter(s) Nebulizer every 4 hours  artificial tears (preservative free) Ophthalmic Solution 1 Drop(s) Both EYES every 4 hours  chlorhexidine 0.12% Liquid 15 milliLiter(s) Oral Mucosa every 12 hours  doxazosin 4 milliGRAM(s) Oral at bedtime  erythromycin   Ointment 1 Application(s) Both EYES every 4 hours  fluticasone propionate 50 MICROgram(s)/spray Nasal Spray 1 Spray(s) Both Nostrils two times a day  heparin   Injectable 5000 Unit(s) SubCutaneous every 8 hours  influenza   Vaccine 0.5 milliLiter(s) IntraMuscular once  lacosamide IVPB 200 milliGRAM(s) IV Intermittent every 12 hours  levETIRAcetam 1500 milliGRAM(s) Oral every 12 hours  LORazepam     Tablet 1 milliGRAM(s) Oral every 12 hours  LORazepam   Injectable 2 milliGRAM(s) IV Push once PRN  metoprolol tartrate 12.5 milliGRAM(s) Oral every 12 hours  pantoprazole   Suspension 40 milliGRAM(s) Oral daily  petrolatum Ophthalmic Ointment 1 Application(s) Both EYES every 4 hours  phenytoin   Suspension 150 milliGRAM(s) Oral <User Schedule>  phenytoin   Suspension 200 milliGRAM(s) Oral <User Schedule>  polyethylene glycol 3350 17 Gram(s) Oral daily  senna 2 Tablet(s) Oral at bedtime  sodium chloride 0.65% Nasal 1 Spray(s) Both Nostrils two times a day      Vital Signs Last 24 Hrs  T(C): 36.8 (03 Mar 2025 04:33), Max: 37.1 (02 Mar 2025 21:41)  T(F): 98.3 (03 Mar 2025 04:33), Max: 98.8 (02 Mar 2025 21:41)  HR: 94 (03 Mar 2025 08:25) (78 - 94)  BP: 109/77 (03 Mar 2025 08:25) (108/70 - 125/84)  BP(mean): 90 (03 Mar 2025 08:25) (84 - 101)  ABP: --  ABP(mean): --  RR: 16 (03 Mar 2025 08:25) (14 - 16)  SpO2: 97% (03 Mar 2025 08:25) (93% - 99%)    O2 Parameters below as of 03 Mar 2025 08:25  Patient On (Oxygen Delivery Method): ventilator    O2 Concentration (%): 40    Neurological Exam in the morning:  Mental status: awake, able to follow some commands like squeezing finger  Eyes: Left eye conjunctival injection and ptosis, eye is abducted and depressed at rest. Right eye vertical nystagmus. Pupils are reactive to light, right eye is more sluggish to constriction than the left. Unable to track movement.   Face: R lower rhythmic synchronous facial twitching present  Motor: Flaccid tone in both upper extremities and lower extremities (0/5). Right hand able to squeeze fingers lightly (1/5). Rhythmic twitching noted of the distal left arm and distal right leg. Stimulus induced myoclonus in right lower ext.   Sensation: Localizes to painful stimuli in RUE and b/l LE.  Coordination: Unable to asses   Reflexes: mute babinski. Hyperreflexive in RLE 4+/5, 3+ in all other areas. Toes  mute  Cerebellar signs unable to assess.   Gait: Deferred    Labs:  CBC Full  -  ( 03 Mar 2025 05:30 )  WBC Count : 6.89 K/uL  RBC Count : 3.20 M/uL  Hemoglobin : 10.1 g/dL  Hematocrit : 31.1 %  Platelet Count - Automated : 237 K/uL  Mean Cell Volume : 97.2 fl  Mean Cell Hemoglobin : 31.6 pg  Mean Cell Hemoglobin Concentration : 32.5 g/dL  Auto Neutrophil # : x  Auto Lymphocyte # : x  Auto Monocyte # : x  Auto Eosinophil # : x  Auto Basophil # : x  Auto Neutrophil % : x  Auto Lymphocyte % : x  Auto Monocyte % : x  Auto Eosinophil % : x  Auto Basophil % : x    03-02    136  |  100  |  57[H]  ----------------------------<  121[H]  4.0   |  23  |  1.14    Ca    9.4      02 Mar 2025 05:30  Phos  3.9     03-01  Mg     2.3     03-01    TPro  7.1  /  Alb  3.6  /  TBili  <0.2  /  DBili  x   /  AST  12  /  ALT  15  /  AlkPhos  211[H]  03-02    LIVER FUNCTIONS - ( 02 Mar 2025 05:30 )  Alb: 3.6 g/dL / Pro: 7.1 g/dL / ALK PHOS: 211 U/L / ALT: 15 U/L / AST: 12 U/L / GGT: x             Urinalysis Basic - ( 02 Mar 2025 05:30 )    Color: x / Appearance: x / SG: x / pH: x  Gluc: 121 mg/dL / Ketone: x  / Bili: x / Urobili: x   Blood: x / Protein: x / Nitrite: x   Leuk Esterase: x / RBC: x / WBC x   Sq Epi: x / Non Sq Epi: x / Bacteria: x

## 2025-03-03 NOTE — PROGRESS NOTE ADULT - SUBJECTIVE AND OBJECTIVE BOX
SUBJECTIVE:  Patient was seen and examined at bedside. Patient unresponsive, opening eyes spontaneously, on ventilator, EEG in place. No events or complaints reported.    Review of systems: unable to obtain     Diet, NPO with Tube Feed:   Tube Feeding Modality: Gastrostomy  Maribeth Sauceda Renal  Total Volume for 24 Hours (mL): 1062  Total Number of Cans: 5  Continuous  Starting Tube Feed Rate mL per Hour: 59  Increase Tube Feed Rate by (mL): 0     Every 4 hours  Until Goal Tube Feed Rate (mL per Hour): 59  Tube Feed Duration (in Hours): 18  Tube Feed Start Time: 11:00  Tube Feed Stop Time: 05:00 (02-20-25 @ 10:42) [Active]      MEDICATIONS:  MEDICATIONS  (STANDING):  albuterol/ipratropium for Nebulization 3 milliLiter(s) Nebulizer every 4 hours  artificial tears (preservative free) Ophthalmic Solution 1 Drop(s) Both EYES every 4 hours  chlorhexidine 0.12% Liquid 15 milliLiter(s) Oral Mucosa every 12 hours  doxazosin 4 milliGRAM(s) Oral at bedtime  erythromycin   Ointment 1 Application(s) Both EYES every 4 hours  fluticasone propionate 50 MICROgram(s)/spray Nasal Spray 1 Spray(s) Both Nostrils two times a day  heparin   Injectable 5000 Unit(s) SubCutaneous every 8 hours  influenza   Vaccine 0.5 milliLiter(s) IntraMuscular once  lacosamide IVPB 200 milliGRAM(s) IV Intermittent every 12 hours  levETIRAcetam 1500 milliGRAM(s) Oral every 12 hours  LORazepam     Tablet 1 milliGRAM(s) Oral every 12 hours  metoprolol tartrate 12.5 milliGRAM(s) Oral every 12 hours  pantoprazole   Suspension 40 milliGRAM(s) Oral daily  petrolatum Ophthalmic Ointment 1 Application(s) Both EYES every 4 hours  phenytoin   Suspension 150 milliGRAM(s) Oral <User Schedule>  phenytoin   Suspension 200 milliGRAM(s) Oral <User Schedule>  polyethylene glycol 3350 17 Gram(s) Oral daily  senna 2 Tablet(s) Oral at bedtime  sodium chloride 0.65% Nasal 1 Spray(s) Both Nostrils two times a day    MEDICATIONS  (PRN):  acetaminophen   Oral Liquid .. 650 milliGRAM(s) Oral every 6 hours PRN Temp greater or equal to 38C (100.4F), Mild Pain (1 - 3)  LORazepam   Injectable 2 milliGRAM(s) IV Push once PRN Seizure Activity (NOTIFY PROVIDER PRIOR TO GIVING)      Allergies    Allergy Status Unknown    Intolerances        OBJECTIVE:  Vital Signs Last 24 Hrs  T(C): 37.1 (03 Mar 2025 08:55), Max: 37.1 (02 Mar 2025 21:41)  T(F): 98.7 (03 Mar 2025 08:55), Max: 98.8 (02 Mar 2025 21:41)  HR: 88 (03 Mar 2025 11:30) (78 - 94)  BP: 120/80 (03 Mar 2025 11:30) (108/70 - 125/84)  BP(mean): 96 (03 Mar 2025 11:30) (84 - 101)  RR: 18 (03 Mar 2025 11:30) (14 - 18)  SpO2: 98% (03 Mar 2025 11:30) (93% - 99%)    Parameters below as of 03 Mar 2025 11:30  Patient On (Oxygen Delivery Method): ventilator    O2 Concentration (%): 40  I&O's Summary    02 Mar 2025 07:01  -  03 Mar 2025 07:00  --------------------------------------------------------  IN: 1862 mL / OUT: 1615 mL / NET: 247 mL    03 Mar 2025 07:01  -  03 Mar 2025 12:14  --------------------------------------------------------  IN: 0 mL / OUT: 350 mL / NET: -350 mL        PHYSICAL EXAM:  General: unresponsive, on vent, no labored breathing, looking comfortable  HEENT: facial twitching on the right  Lungs: limited anterior, no crackles, no wheezes  Abdomen: PEG, soft, no distension, no visible tenderness, + BS  Extremities: warm, no edema, not following commands     LABS:                        10.1   6.89  )-----------( 237      ( 03 Mar 2025 05:30 )             31.1     03-03    138  |  101  |  56[H]  ----------------------------<  138[H]  4.0   |  25  |  1.12    Ca    9.3      03 Mar 2025 05:30  Phos  3.5     03-03  Mg     2.5     03-03    TPro  7.1  /  Alb  3.6  /  TBili  <0.2  /  DBili  x   /  AST  12  /  ALT  15  /  AlkPhos  211[H]  03-02    LIVER FUNCTIONS - ( 02 Mar 2025 05:30 )  Alb: 3.6 g/dL / Pro: 7.1 g/dL / ALK PHOS: 211 U/L / ALT: 15 U/L / AST: 12 U/L / GGT: x             CAPILLARY BLOOD GLUCOSE        Urinalysis Basic - ( 03 Mar 2025 05:30 )    Color: x / Appearance: x / SG: x / pH: x  Gluc: 138 mg/dL / Ketone: x  / Bili: x / Urobili: x   Blood: x / Protein: x / Nitrite: x   Leuk Esterase: x / RBC: x / WBC x   Sq Epi: x / Non Sq Epi: x / Bacteria: x        MICRODATA:      RADIOLOGY/OTHER STUDIES:   SUBJECTIVE:  Patient was seen and examined at bedside. Patient unresponsive, opening eyes spontaneously, on ventilator, EEG in place. No events or complaints reported.    Review of systems: unable to obtain     Diet, NPO with Tube Feed:   Tube Feeding Modality: Gastrostomy  Maribeth Sauceda Renal  Total Volume for 24 Hours (mL): 1062  Total Number of Cans: 5  Continuous  Starting Tube Feed Rate mL per Hour: 59  Increase Tube Feed Rate by (mL): 0     Every 4 hours  Until Goal Tube Feed Rate (mL per Hour): 59  Tube Feed Duration (in Hours): 18  Tube Feed Start Time: 11:00  Tube Feed Stop Time: 05:00 (02-20-25 @ 10:42) [Active]    MEDICATIONS  (STANDING):  albuterol/ipratropium for Nebulization 3 milliLiter(s) Nebulizer every 4 hours  artificial tears (preservative free) Ophthalmic Solution 1 Drop(s) Both EYES every 4 hours  chlorhexidine 0.12% Liquid 15 milliLiter(s) Oral Mucosa every 12 hours  doxazosin 4 milliGRAM(s) Oral at bedtime  erythromycin   Ointment 1 Application(s) Both EYES every 4 hours  fluticasone propionate 50 MICROgram(s)/spray Nasal Spray 1 Spray(s) Both Nostrils two times a day  heparin   Injectable 5000 Unit(s) SubCutaneous every 8 hours  influenza   Vaccine 0.5 milliLiter(s) IntraMuscular once  lacosamide IVPB 200 milliGRAM(s) IV Intermittent every 12 hours  levETIRAcetam 1500 milliGRAM(s) Oral every 12 hours  LORazepam     Tablet 1 milliGRAM(s) Oral every 12 hours  metoprolol tartrate 12.5 milliGRAM(s) Oral every 12 hours  pantoprazole   Suspension 40 milliGRAM(s) Oral daily  petrolatum Ophthalmic Ointment 1 Application(s) Both EYES every 4 hours  phenytoin   Suspension 150 milliGRAM(s) Oral <User Schedule>  phenytoin   Suspension 200 milliGRAM(s) Oral <User Schedule>  polyethylene glycol 3350 17 Gram(s) Oral daily  senna 2 Tablet(s) Oral at bedtime  sodium chloride 0.65% Nasal 1 Spray(s) Both Nostrils two times a day    MEDICATIONS  (PRN):  acetaminophen   Oral Liquid .. 650 milliGRAM(s) Oral every 6 hours PRN Temp greater or equal to 38C (100.4F), Mild Pain (1 - 3)  LORazepam   Injectable 2 milliGRAM(s) IV Push once PRN Seizure Activity (NOTIFY PROVIDER PRIOR TO GIVING)    Allergies    Allergy Status Unknown    Intolerances        OBJECTIVE:  Vital Signs Last 24 Hrs  T(C): 37.1 (03 Mar 2025 08:55), Max: 37.1 (02 Mar 2025 21:41)  T(F): 98.7 (03 Mar 2025 08:55), Max: 98.8 (02 Mar 2025 21:41)  HR: 88 (03 Mar 2025 11:30) (78 - 94)  BP: 120/80 (03 Mar 2025 11:30) (108/70 - 125/84)  BP(mean): 96 (03 Mar 2025 11:30) (84 - 101)  RR: 18 (03 Mar 2025 11:30) (14 - 18)  SpO2: 98% (03 Mar 2025 11:30) (93% - 99%)    Parameters below as of 03 Mar 2025 11:30  Patient On (Oxygen Delivery Method): ventilator    O2 Concentration (%): 40  I&O's Summary    02 Mar 2025 07:01  -  03 Mar 2025 07:00  --------------------------------------------------------  IN: 1862 mL / OUT: 1615 mL / NET: 247 mL    03 Mar 2025 07:01  -  03 Mar 2025 12:14  --------------------------------------------------------  IN: 0 mL / OUT: 350 mL / NET: -350 mL        PHYSICAL EXAM:  General: unresponsive, on vent, no labored breathing, looking comfortable  HEENT: facial twitching on the right  Lungs: limited anterior, no crackles, no wheezes  Abdomen: PEG, soft, no distension, no visible tenderness, + BS  Extremities: warm, no edema, not following commands     LABS:                        10.1   6.89  )-----------( 237      ( 03 Mar 2025 05:30 )             31.1     03-03    138  |  101  |  56[H]  ----------------------------<  138[H]  4.0   |  25  |  1.12    Ca    9.3      03 Mar 2025 05:30  Phos  3.5     03-03  Mg     2.5     03-03    TPro  7.1  /  Alb  3.6  /  TBili  <0.2  /  DBili  x   /  AST  12  /  ALT  15  /  AlkPhos  211[H]  03-02    LIVER FUNCTIONS - ( 02 Mar 2025 05:30 )  Alb: 3.6 g/dL / Pro: 7.1 g/dL / ALK PHOS: 211 U/L / ALT: 15 U/L / AST: 12 U/L / GGT: x             CAPILLARY BLOOD GLUCOSE        Urinalysis Basic - ( 03 Mar 2025 05:30 )    Color: x / Appearance: x / SG: x / pH: x  Gluc: 138 mg/dL / Ketone: x  / Bili: x / Urobili: x   Blood: x / Protein: x / Nitrite: x   Leuk Esterase: x / RBC: x / WBC x   Sq Epi: x / Non Sq Epi: x / Bacteria: x        MICRODATA:      RADIOLOGY/OTHER STUDIES:

## 2025-03-03 NOTE — EEG REPORT - NS EEG TEXT BOX
[facility] Department of Neurology  Inpatient Epilepsy Monitoring Unit video-Electroencephalography Report    Acquisition Details:  Electroencephalography was acquired using a minimum of 21 channels on an [employer] system v 9.3.1 with electrode placement according to the standard International 10-20 system following ACNS (American Clinical Neurophysiology Society) guidelines for Long-Term Video EEG monitoring.  Anterior temporal T1 and T2 electrodes were utilized whenever possible.   The [employer] automated spike & seizure detections were all reviewed in detail, in addition to extensive portions of raw EEG.  Specially-trained nurses were available for seizure-related events.  Continuous live-time video monitoring of the patients for seizure-related and safety events was performed by specially-trained technicians.        Day 2:  3/2/2025, 7:00 AM to next morning at 07:00 AM   Background:  continuous, with predominantly theta/delta  Generalized Slowing:  Intermittent frequent sporadic polymorphic delta  Symmetry/Focality: No persistent asymmetries of voltage or frequency.     However due to significant artifact over right frontotemporal area  limited interpretation  Voltage:  Low (most <20uV LBP Peak-Trough)  Organization:  Absent  Posterior Dominant Rhythm:  No clear PDR   Sleep:  Rudimentary but likely N2 transients present.  Variability:   Yes		Reactivity:  No    Spontaneous Activity:  No epileptiform discharges     Events:at 12:07 PM, there is evidence of muscle artifact over right fronto-temporal with right arm shaking and right side of face is on pillow and not visible on video  and rhythmic activity with questionable spike wave discharges over left frontotemporal with evolution in frequency, another seizure at 14 PM with left lip.face twitching and EeG showing right fronto-temporal muscle artifact  and left rhythmic activity. During recording he had intermittent irregular left lip twitching and continuous right lip/face twitching    Provocations:  •	Hyperventilation: was not performed.  •	Photic stimulation: was not performed.  Daily Summary:    1)	Multiple event( right face twitching, left face/lip irregular twitching, right arm shaking) were captured with EMG artifact over right fronto-temporal and EEG showed rhythmic delta/theta activity with evolution in frequency which consistent with clinical surface negative focal seizures vs movement disorder  2)	Moderate to severe generalized slowing indicating similar degree of diffuse cerebral dysfunction    Bessie Martinez MD  CNP Fellow   [facility] Department of Neurology  Inpatient Epilepsy Monitoring Unit video-Electroencephalography Report    Acquisition Details:  Electroencephalography was acquired using a minimum of 21 channels on an [employer] system v 9.3.1 with electrode placement according to the standard International 10-20 system following ACNS (American Clinical Neurophysiology Society) guidelines for Long-Term Video EEG monitoring.  Anterior temporal T1 and T2 electrodes were utilized whenever possible.   The [employer]TEK automated spike & seizure detections were all reviewed in detail, in addition to extensive portions of raw EEG.  Specially-trained nurses were available for seizure-related events.  Continuous live-time video monitoring of the patients for seizure-related and safety events was performed by specially-trained technicians.        Day 2:  3/2/2025, 7:00 AM to next morning at 07:00 AM   Meds:keppra 1500 mg bid, phenytoin 150/150/200, vimpat 200 mg bid, Ativan 1 mg bid  Background:  continuous, with predominantly theta/delta  Generalized Slowing:  Intermittent frequent sporadic polymorphic delta  Symmetry/Focality: No persistent asymmetries of voltage or frequency.     However due to significant artifact over right frontotemporal area  limited interpretation  Voltage:  Low (most <20uV LBP Peak-Trough)  Organization:  Absent  Posterior Dominant Rhythm:  No clear PDR   Sleep:  Rudimentary but likely N2 transients present.  Variability:   Yes		Reactivity:  No    Spontaneous Activity:  No epileptiform discharges     Events:at 12:07 PM, there is evidence of muscle artifact over right fronto-temporal with right arm shaking and right side of face is on pillow and not visible on video  and rhythmic activity with questionable spike wave discharges over left frontotemporal with evolution in frequency, another seizure at 14 PM with left lip.face twitching and EeG showing right fronto-temporal muscle artifact  and left rhythmic activity. During recording he had intermittent irregular left lip twitching and continuous right lip/face twitching    Provocations:  •	Hyperventilation: was not performed.  •	Photic stimulation: was not performed.  Daily Summary:    1)	Multiple event( right face twitching, left face/lip irregular twitching, right arm shaking) were captured with EMG artifact over right fronto-temporal and EEG showed rhythmic delta/theta activity with evolution in frequency which is suspicious for  clinical surface negative focal seizures vs movement disorder  2)	Moderate to severe generalized slowing indicating similar degree of diffuse cerebral dysfunction    Bessie Martinez MD  CNP Fellow   [facility] Department of Neurology  Inpatient Epilepsy Monitoring Unit video-Electroencephalography Report    Acquisition Details:  Electroencephalography was acquired using a minimum of 21 channels on an [employer] system v 9.3.1 with electrode placement according to the standard International 10-20 system following ACNS (American Clinical Neurophysiology Society) guidelines for Long-Term Video EEG monitoring.  Anterior temporal T1 and T2 electrodes were utilized whenever possible.   The [employer] automated spike & seizure detections were all reviewed in detail, in addition to extensive portions of raw EEG.  Specially-trained nurses were available for seizure-related events.  Continuous live-time video monitoring of the patients for seizure-related and safety events was performed by specially-trained technicians.        Day 2:  3/2/2025, 7:00 AM to next morning at 07:00 AM   Meds:keppra 1500 mg bid, phenytoin 150/150/200, vimpat 200 mg bid, Ativan 1 mg bid  Background:  continuous, with predominantly theta/delta  Generalized Slowing:  Intermittent frequent sporadic polymorphic delta  Symmetry/Focality: No persistent asymmetries of voltage or frequency.     However due to significant artifact over right frontotemporal area  limited interpretation  Voltage:  Low (most <20uV LBP Peak-Trough)  Organization:  Absent  Posterior Dominant Rhythm:  No clear PDR   Sleep:  Rudimentary but likely N2 transients present.  Variability:   Yes		Reactivity:  Unclear    Spontaneous Activity:  No epileptiform discharges     Events:at 12:07 PM, there is evidence of muscle artifact over right fronto-temporal with right arm shaking and right side of face is on pillow and not visible on video  and rhythmic activity with questionable spike wave discharges over left frontotemporal with evolution in frequency, another seizure at 14 PM with left lip.face twitching and EeG showing right fronto-temporal muscle artifact  and left rhythmic activity. During recording he had intermittent irregular left lip twitching and continuous right lip/face twitching    Provocations:  •	Hyperventilation: was not performed.  •	Photic stimulation: was not performed.  Daily Summary:    1)	Multiple event( right face twitching, left face/lip irregular twitching, right arm shaking) were captured with EMG artifact over right fronto-temporal and EEG showed rhythmic delta/theta activity with evolution in frequency which is suspicious for  clinical surface negative focal seizures vs movement disorder  2)	Moderate to severe generalized slowing indicating similar degree of diffuse cerebral dysfunction    Bessie Martinez MD  CNP Fellow

## 2025-03-03 NOTE — PROGRESS NOTE ADULT - NUTRITIONAL ASSESSMENT
Diet, NPO with Tube Feed:   Tube Feeding Modality: Gastrostomy  Maribeth Farms Renal  Total Volume for 24 Hours (mL): 1062  Total Number of Cans: 5  Continuous  Starting Tube Feed Rate {mL per Hour}: 59  Increase Tube Feed Rate by (mL): 0     Every 4 hours  Until Goal Tube Feed Rate (mL per Hour): 59  Tube Feed Duration (in Hours): 18  Tube Feed Start Time: 11:00  Tube Feed Stop Time: 05:00 (02-20-25 @ 10:42) [Active]
Diet, NPO with Tube Feed:   Tube Feeding Modality: Gastrostomy  Maribeth Farms Renal  Total Volume for 24 Hours (mL): 1062  Total Number of Cans: 5  Continuous  Starting Tube Feed Rate {mL per Hour}: 59  Increase Tube Feed Rate by (mL): 0     Every 4 hours  Until Goal Tube Feed Rate (mL per Hour): 59  Tube Feed Duration (in Hours): 18  Tube Feed Start Time: 11:00  Tube Feed Stop Time: 05:00 (02-20-25 @ 10:42) [Active]
NAUSEA/ABDOMINAL PAIN/VOMITING

## 2025-03-03 NOTE — PROGRESS NOTE ADULT - ASSESSMENT
44 year-old-male with unclear PMH (?stroke, MI) who was found down at work, intubated for airway protection and found to have acute large parenchymal hemorrhage within nabil with mass effect (+ acute/subacute right cerebellar infarct) in setting of hypertensive emergency, and R cerebellar stroke transferred to [facility] for further neurosurgical care. Epilepsy called back on 3/1 for concern of seizure contributing to worsening mental status as repeat CTH was negative. Previous vEEGs were notable for right quadrant anterior seizures and epileptiform potential. Patient has long standing intermittent facial twitching at times not correlated to seizures as per chart review.          44 year-old-male with unclear PMH (?stroke, MI) who was found down at work, intubated for airway protection and found to have acute large parenchymal hemorrhage within nabil with mass effect (+ acute/subacute right cerebellar infarct) in setting of hypertensive emergency, and R cerebellar stroke transferred to [facility] for further neurosurgical care. Epilepsy called back on 3/1 for concern of seizure contributing to worsening mental status as repeat CTH was negative. Previous vEEGs were notable for right quadrant anterior seizures and epileptiform potential. Patient has long standing intermittent facial twitching at times not correlated to seizures as per chart review.  3/1 EEG showed electroclinical seizures in which his head was turning towards right side with R arm extension and correlated with EEG findings of evolving rhythmic L frontotemporal delta. EEG imrpoved after Vimpat loading and maintenance dose. His mental status also improved from 3/1. The etiology of seizures recurrence is currently unclear. Phenytoin level on 3/1 was therapeutic and no signs of active infection. Would consider repeating MRI.      Recommendations:  - c/w VEEG for now  - c/w Vimpat 100mg BID  - Continue Keppra 1500mg Q12hrs   - Continue Phenytoin to 150/150/200mg   - please obtain EKG to check for any conduction abnormalities ([place] prolongation) as Vimpat was started yesterday  - repeat MRI brain w/wo c+ it primary team in agreement.  - f/u keppra levels (drawn on 3/2)  - Continue Ativan 1 mg to q12h   - Maintain seizure/fall/aspiration precautions  - Rest of the management per primary team     Discussed with Attending 44 year-old-male with unclear PMH (?stroke, MI) who was found down at work, intubated for airway protection and found to have acute large parenchymal hemorrhage within nabil with mass effect (+ acute/subacute right cerebellar infarct) in setting of hypertensive emergency, and R cerebellar stroke transferred to [facility] for further neurosurgical care. Epilepsy called back on 3/1 for concern of seizure contributing to worsening mental status as repeat CTH was negative. Previous vEEGs were notable for right quadrant anterior seizures and epileptiform potential. Patient has long standing intermittent facial twitching at times not correlated to seizures as per chart review.  3/1 EEG showed electroclinical seizures in which his head was turning towards right side with R arm extension and correlated with EEG findings of evolving rhythmic L frontotemporal delta. EEG imrpoved after Vimpat loading and maintenance dose. Today on exam, he is more awake and follows some simple commands however has persistent right rythymic lower facial and leg twiching. vEEG overnight showed       Recommendations:  - c/w VEEG for now  - c/w Vimpat 200mg BID  - Continue Keppra 1500mg Q12hrs   - Continue Phenytoin to 150/150/200mg   - please obtain EKG to check for any conduction abnormalities ([place] prolongation) as Vimpat was started yesterday  - repeat MRI brain w/wo c+ it primary team in agreement.  - f/u keppra levels (drawn on 3/2)  - Continue Ativan 1 mg to q12h   - Maintain seizure/fall/aspiration precautions  - Rest of the management per primary team      44 year-old-male with unclear PMH (?stroke, MI) who was found down at work, intubated for airway protection and found to have acute large parenchymal hemorrhage within nabil with mass effect (+ acute/subacute right cerebellar infarct) in setting of hypertensive emergency, and R cerebellar stroke transferred to [facility] for further neurosurgical care. Epilepsy called back on 3/1 for concern of seizure contributing to worsening mental status as repeat CTH was negative. Previous vEEGs were notable for right quadrant anterior seizures and epileptiform potential. Patient has long standing intermittent facial twitching at times not correlated to seizures as per chart review.  3/1 EEG showed electroclinical seizures in which his head was turning towards right side with R arm extension and correlated with EEG findings of evolving rhythmic L frontotemporal delta. EEG improved after Vimpat loading and maintenance dose. Today on exam, he is more awake and follows some simple commands however has persistent right rhythmic lower facial and leg twitching vEEG overnight showed multiple event( right face twitching, left face/lip irregular twitching, right arm shaking) were captured with EMG artifact over right fronto-temporal and EEG showed rhythmic delta/theta activity with evolution in frequency which is suspicious for clinical surface negative focal seizures vs movement disorder.       Recommendations:  - c/w VEEG for now  - c/w Vimpat 200mg BID  - Continue Keppra 1500mg Q12hrs   - Continue Phenytoin to 150/150/200mg   - please obtain EKG to check for any conduction abnormalities ([place] prolongation) as Vimpat was started yesterday  - repeat MRI brain w/wo c+ it primary team in agreement.  - Recommend starting Gabapentin 300 mg TID  - f/u keppra levels (drawn on 3/2)  - Continue Ativan 1 mg to q12h   - Maintain seizure/fall/aspiration precautions  - Rest of the management per primary team

## 2025-03-03 NOTE — PROGRESS NOTE ADULT - SUBJECTIVE AND OBJECTIVE BOX
HPI:  Unknown age male, unknown past medical history (reported stroke and MI by coworkers) presented to [facility] with AMS, Pt was working at [employer] when he was found down by coworkers. EMS called and pt brought to [facility] ED. Intubated, sedated, started on cardene for SBPs in 200s. CT head showed brain stem bleed. Transferred to NSICU for further management.  (30 Sep 2024 12:55)    HOSPITAL COURSE:  9/30: transferred from [facility]. A line placed. Versed dc'd. Cy Rader at bedside, states pt has family in [place], cannot confirm medications or PMH other than stroke and MI. 250cc bolus 3% given. LR switched to NS. hydralazine 25q8 started, 3% started, switched propofol to precedex   10/1: stability CTH done. Added labetalol, started TF. Palliative consulted. ethics consulted to determine surrogate. febrile 103, pan cx sent  10/2: BD 2, GEORGE overnight. TF resumed. Desatt'd to 80s, FiO2 inc. to 50. Fentanyl given, ABG, CXR ordered. Maxxed on precedex, started on propofol for DARIEN -4 - -5. Precedex dc'd. Duonebs, mucomyst, hypertonic added. 3% dc'd. Cardene dc'd. Start vanc/CTX. Increased labetalol 200q8. MRSA negative, dc'd vanc. ETT pulled back 2cm x 2, good positioning after confirmatory chest xray. Ethics attempting to establish HCP with family. Na 159, starting FW 250q6 for range 150-155.   10/3: BD3, GEORGE o/n, neuro stable. Na elevating, FW increased to 300q6. Dc'd bowel reg for diarrhea. vEEG started. SQH 5000q8 tonight.   10/4: BD 4, albumn bolus, incr. LR to 80 2/2 incr. in Cr, LR to 10 0cc/hr for uptrending Cr. Started 7% hypersal for 48hrs and SL atropine for inline/oral thick secretions. Dc'd CTX and started ancef for MSSA in the sputum. Nephrology consulted for CKD, f/u recs. SBP 170s, given hydralazine 10mg IVP.   10/5: BD5, o/n 10mg IVP hydralazine given for SBP 170s and started on hydralazine 25q8 via OGT. 10mg IV push labetalol for SBP > 160s. RT placed for diarrhea.   10/6: BD6, o/n FW increased to 350q4 per nephrology recs. IV tylenol for temp 100.6, SBp 160s presumed uncomfortable.   10/7: BD7, overnight pancultured for temp 101.8F.   10/8: BD8. GEORGE. Cr bumped. decreased LR to 75cc/hr. Adding simethicone ATC. incr hydralazine 50mgTID. Incr labetalol 300mgTID. Na 145, decreased FWF to 250q6. Start precedex. FENa consistent with intrinsic kidney injury. Pend repeat renal US. Retaining up to 1.3L, bladder scans q6, straight cath PRN  10/9: BD 9. GEORGE overnight. Neuro stable. abd xray for distention w non-specific gas pattern, OGT to LIWS for morning. duonebs/mucomyst to q8 for improving secretions. Changed tube feeds to Jevity 1.5 20cc/hr, low rate due to abdominal distention, nepro dense and more difficult to digest. Tolerating CPAP, confirmed by ABG.   10/10: BD 10. GEORGE overnight. Neuro stable. (+) gabriel for urinary retention on bladder scan. inc TF to goal rate of 40cc/hr. family leaning toward pursuing trach/PEG. 1/2 amp for FS 81.   10/11: BD 11. GEORGE overnight. Neuro stable. Trach/PEG consults placed.   10/12: BD 12. GEORGE overnight. Neuro stable. MRI brain complete.   10/13: BD 13. Increase flomax. Hold SQH after PM dose for trach tm. IVL.   10/14: BD 14. GEORGE overnight, remains on AC/VC. Gabriel placed for urinary retention. Dc'd free water.  S/p trach with pulm. NGT placed and CXR confirmed in good position.   10/15: BD 15, GEORGE ovn. resumed feeds. spiked 101, pan cx sent.   10/16: BD 16. GEORGE ovn. Lokelma 5mg for K+ 5.4. Started vanc q 24/zosyn for empiric PNA coverage, IVF to 100/hr. PEG held for fever.   10/17: BD 17,  ordered serum osm and urine osm for am. Started sinemet for neurostimulation. Increased cardura to 0.8. Started FW 100q4, dc'd IVF. MRSA negative, dc'd vanc. NGT replaced d/t coiling.   10/18: BD 18, GEORGE overnight, neuro stable. Amantadine added for neurostim. zosyn changed to unasyn for acinetobacter baumannii, failed TOV and required SC  10/19: BD 19, GEORGE ovn. cardura 2mg added for retention. labetalol decreased 200q8, hydralazine decreased 25q8. Gabriel replaced.   10/20: BD20, GEORGE overnight. NGT dislodged, replaced. PEG tomorrow w/ gen surg, FW increased to 150q4 and labetalol decreased to 100q8, lokelma given for hyperkalemia.   10/21: BD 21. POD0 PEG placement with Gen surg. decr labetolol to 50q8, incr. cardura to 0.4, started lokelma and phoslo, dc gabriel POD0 PEG placement with Gen surg.  10/22: BD 22. Plan to start TF today via PEG. dc labetalol, Following ophtho recs. Increased apnea settings - found to be in cheyne-moe respiration. CPAP 5/5.  10/23: BD 23. hydralazine d/c'd, trach collar trial today. Rectal tube placed at 6am.  10/24: BD24, o/n lokelma held due to diarrhea. Free water 100q6 resumed. dc'd tamsulosin, amantadine. Incr'd cardura to 8mg qhs. Dc'd FW. Switched jevity to nepro. gabriel placed for high urine output. Started SL atropine for oral secretions. Dc'd free water.  10/25: BD25, o/n decreased suctioning requirements to > q4hrs, GEORGE. Cr improving, cont phoslo, lokelma held at this time. Gabriel placed yest, cont. Tolerating trach collar. Given 500cc plasmalyte bolus for ANIKA. Dc'd sinemet.   10/26: BD26, o/n resumed lokelma 5mg daily and resumed 100cc free water q6hrs. Change in neuro status with new right pupillary dilation with anisocoria (right pupil 6mm fixed and left pupil 3mm briskly reactive). Given 23.4% NaCl bullet, taken for emergent CTH showing mostly resolved pontine hemorrhage, continued brainstem hypodensity likely edema d/t hemorrhage, no new hemorrhage or infarct, no herniation, mild increase in size of left lateral ventricle. Vitals remaine stable. Na goal > 140.   10/27: BD27, o/n GEORGE.Neuro stable. Pend stepdown with airway bed.   10/28: BD 28. GEORGE overnight. Neuro stable. Miralax ordered. Gabriel removed, pending TOV.  10/29: BD 29. GEORGE o/n. Given 2L NS over 8 hrs for increased BUN/Cr ratio. Gabriel placed for frequent straight cath.   10/30: BD 30.   10/31: BD 31. GEORGE overnight. Na 149, increased free water to 200q6. 1L NS for uptrending BUN.   11/1: BD 32. GEORGE overnight. Given 1L NS for dehydration. Na 146, increased FW to 250q6.   11/2: BD 33 GEORGE overnight, neuro stable, given 500cc bolus for net negative status and tachycardia   11/3: BD 34, GEORGE overnight, neuro stable. Patient remains tachycardic, EKG showing sinus tachycardia, given additional 500cc NS bolus. Febrile to 101.9F, pan cultured (without UA), CXR WNL, given tylenol.   11/4: BD 35, GEORGE overnight, neuro stable. Given 1L NS for tachycardia. sputum (+) for stenotropohomas maltophilia.   11/5: BD 36 GEORGE overnight, neuro stable. Vancomycin dc'd. Chest PT BID. ID consulted, cont zosyn.  11/6: BD 37. blood cx + klebsiella dc zosyn changed to cefepime, CTAP ordered, rpt blood cx sent.    11/7: BD 38. Pending CT A/P, given 250cc bolus and starting maintenance fluids overnight. Pending CT A/P after bolus   11/8: BD 39. CT CAP negative for infection.   11/9: BD 40. GEORGE overnight.  11/10: BD 41. GEORGE overnight. desat to 85 on trach collar, O2 inc to 10L and 100%, O2 sat inc to 95. pt tachy to 110s, euvolemic. given tylenol. ABG and CXR ordered. spiked fever, pancultured, RVP negative. AM ABG w pO2 79, rpt w pO2 79. pt appears comfortable, satting 94%.   11/11: BD 42. GEORGE overnight. pt became tachy to 130s, desat to 90 on 100% FiO2 and 10L. suctioned, (+) productive cough. temp 101.4, given 1g IV tylenol and 500cc NS bolus for euvolemia. fever and HR downtrending. LE dopplers negative for dvt  11/12: BD 43, GEORGE ovn, fever and HR downtrending, satting 97% 70% FIO2  11/13: BD 44, GEORGE ovn. started standing tylenol x24 hours for tachycardia. desat to 80s, o2 increased. CXR stable, pending CTA PE protocol.   11/14: BD 45, GEORGE overnight, neuro stable. resp therapy dec FiO2 to 70%.   11/15: BD 46, GEORGE overnight, neuro stable.  Rapid called for desaturation 30s, tachycardic 140s. Patient bagged, 100% fio2, heavily suctioned. CXR/POCUS unremarkable. ABG c/w desaturation. WBC 14.71. Afebrile. O2 improved to 90s and patient upgraded to ICU. ABG paO2 30s improved to 89 on vent. IV Tylenol x 1, sputum sent. Start protonix while o-n vent.   11/16: BD 47. POCUS showed collapsable IVCF, given 1L bolus. Vanco/Cefpime added empriic for PNA, NGT feeds restarted. MRSA swab neg, Vanc DC'd.   11/17: BD 48. GEORGE overnight. 1l bolus for tachycardia. Spiked to 101, cultured. 500cc bolus for tachycardia, tachy to 148 given 25mcg fentanyl, 250cc albumin, 1.5L bolus. 5 IV lopressor with response HR to 100s. +Stenotrophomonas on sputum cx.   11/18: BD 49. GEORGE overnight. Consulted ID, cefepime switched to bactrim x7days. Started hydrochlorothiazine 12.5mg daily.  11/19: BD 50. Tachy 120s, given tylenol and 500cc NS. Tolerating 5/5, switched to TCx. Holding phos binder. D/c Bactrim. D/c gabriel, f/u TOV. Dc'd PPI.   11/20: BD 51. GEORGE ovn. 1600 satting low 90s, mildly tachy to 110s, afebrile, RR wnl. O2 improved to mid 90s while inc O2 to 100% on TCx. CPAP 5/5 placed back on.  11/21: BD 52, GEORGE ovn, tolerating CPAP 5/5. Switch to trach collar during the day if tolerating well. HCTZ held for Cr bump, straight cath frequence increased to q4  11/22: BD 53, GEORGE ovn. Resumed phoslo. Gabriel placed. Resumed HCTZ.   11/23: BD 54. Holding tylenol in setting of possible fever, will require pan cx if febrile. Cr improved today. Cont CPAP. Bowel regimen held i/s/o diarrhea. FOBT negative.  11/24: BD 55. GEORGE overnight. Neuro stable. HCTZ dc'ed, started lisinopril 5. Lokelma dc'ed for K 3.7.   11/25: BD 56, GEORGE overnight. Neuro stable. dc'd lisinopril 5mg. Gabriel dc'd. TOV. 1545 noted to be hypotensive, MAP 50, in supine position on chair, HR 60s, afebrile, O2 96%. Given 1L cc NS bolus, placed back on bed in reverse trendelenberg, improved to map of 66. Neostick at bedside. Vitals check q1h. Dc'ed amlodipine. Failed TOV, bladder scan q6, sc prn. Added back Senna.   11/26: BD 57, GEORGE overnight. Neuro stable. Dc'd phoslo.   11/27: BD 58, GEORGE overnight. Neuro stable.    11/28: BD 59. Gabriel replaced.   11/29-12/1: GEORGE, neuro stable.   12/2: BD 63, GEORGE overnight.; Given 1L bolus of LR for uptrending BUN/Cr.  12/3: BD 64. Reinstated eye gtt/moisture chamber given increased Rt eye injection  12/4: BD 65. GEORGE overnight. Attempted to speak with ophtho regarding eyelid weight/closure but no answer, full mailbox.   12/5: BD 66, GEORGE overnight, bowel regimen increased and had BM.   12/6: BD 67, GEORGE overnight, neuro stable.  12/7: GEORGE overnight, neuro stable. /110s, given x1 hydralazine 10 mg IVP. Restarted home amlodipine 5mg.  12/8: GEORGE. OOB to chair.     12/11: GEORGE, mucomyst added for thick secretions, simethicone for abd distension, abd xray with stool burden, increased bowel regimen.   12/12-12/14: GEORGE   12/15: o/n Patient became tachycardic HR 120s and 10 minutes later O2 sat dropped as low as 89%. Patient suctioned without improvement in O2 sat and tube feeds found in suction catheter. TFs held.  STAT CXR ordered. STAT labs sent. Respiratory therapist called to bedside and patient trach connected to ventilator. After connection to ventilator and further suctioning O2 sat improved to 97% but patient HR remains 120-130s. Upgraded to NSICU for further management. Vancomycin and zosyn started. CTA  chest PE protocol and CTH ordered. Blood cultures sent.given 500cc bolus, rpt ABG sent pO2 243, CTH and CTA chest done. FS while NPO, FiO2 dec 50 pending ABG. sputum cx positive for few GPC and GNR.   12/16: GEORGE overnight, restarted amlodipine, troponin 75   12/17: GEORGE overnight, neuro stable. Attempted CPAP this morning, did not tolerate and back on full vent support. Dc'd Vanc. Tachycardia to 140s, noted extremities to be twitching along with jaw twitching. Given 2g of Keppra, total 4mg ativan and placed on EEG, full set of labs and lactate negative. Resumed trickle feeds at 20cc/hr. Given 250cc albumin for tachycardia.   12/18: GEORGE overnight. Neuro stable. CTH stable. EEG negative and dc'd.   12/19: GEORGE overnight. neuro stable. SIMV most of day, AC/VC at night.   12/20: GEORGE overnight, neuro stable. remains on VC/AC  12/21: GEORGE ovn. 1L bolus for tachy to 120s-130s.   12/22: GEORGE ovn. Tachy to 120s, given tylenol and IVF. 2mg IV ativan given for L jaw twitching. Sx resolved for 3 minutes and started again. Epilepsy contacted. Given 2mg IV ativan. Continued twitching. Increased keppra to 1500mg BID, 2mg ativan given. Propranolol started for refractory tachycardia. vEEG ordered. albumin given. POCUS performed, no b lines. Started on fosphenytoin, loaded w 20mg/kg then 100mg TID. febrile, pancx, started cefepime 2g q8, vancomycin  12/23: GEORGE ovn. +focal motor seizures on eeg. ID consulted. Vancomycin dc'ed as per ID.  12/24-12/26: GEORGE ovn, neuro stable. Baclofen 5 mg q12 started for hiccups. Na 129 from 134. Urine lytes c/w SIADH. Given 250cc 3% bolus. CT chest for infection w/u - f/u read. Repeat Na 136. UA negative  12/27: GEORGE overnight. Blood cx neg @ 4 days, DC cefepime per medicine (sputum colonized).   12/28: Desaturation o/n to 80's, CXR obtained, pulse ox changed and sats resolved. Na 133 from 138, 250cc 3% bolus given. Cefepime resumed until 12/30 per ID. Rpt Na 138.  12/29: GEORGE overnight. Na stable.   12/30: GEORGE overnight. Family meeting with son, pt now DNR.   12/31: GEORGE overnight.   1/1-1/5: GEORGE overnight.   1/6: GEORGE overnight, neuro stable   1/7: GEORGE overnight, neuro stable. BUN/Cr increased, increased FW to 200q6. Given 1L bolus of LR over 2 hours. Gabriel d/c'd, voiding, continue bladder scan q6.   1/8: GEORGE overnight, neuro stable. BUN/Cr improving.  1/9: GEORGE overnight, neuro stable.   1/10: GEORGE overnight, neuro stable.   1/11: GEORGE overnight, neuro stable, vent settings stable.   1/12: GEORGE overnight, neuro stable. Febrile to 102F, f/u pan cx, chest xray, given tylenol. Zosyn started.   1/13: GEORGE overnight, neuro stable, cont Zosyn for presumed PNA, prelim sputum cx growing; few GS neg diplococci, mod GS neg rods, rare GS + rods, fever trend improved. Free water increased to [id number].   1/14: GEORGE overnight. pending renal US for elevated Cr  1/15-1/18: GEORGE   1/19: GEORGE overnight, neuro stable. Propranolol dec to 5q8.   1/20: GEORGE overnight, neuro stable. Weaned propranolol to 5mg BID.   1/21: GEORGE ovn, in AM having rapid vertical eye movements with facial twitching, 2g total keppra given for AM dose and phenytoin level ordered, vital signs stable. CTH stable. Phenytoin increased to 100/100/150mg per epilepsy  1/22: GEORGE ovn. IV hydral x 1 for SBP 170s.   1/23: Cont to have focal motor seizures. Per epilepsy, changed phenytoin dose to 100/100/200.   1/24: Phenytoin lvl tmrw AM. Chest Xray completed due to temp 100.2F  1/25: GEORGE overnight. Incr dilantin morning and afternoon per epilepsy.   1/26: GEORGE overnight. Sustained focal twitching noticed by nursing. Ativan 8mg in total given. Fosphenytoin 1500mg IV given. Placed on EEG. Epilepsy following. TFs held. Phenytoin increased to 150/150/200.   1/27: o/n CTH completed due to continued lethargy after ativan given yesterday afternoon. TFs resumed. 500cc bolus NS given for SBP 90s. EEG dc'ed, discussed w/ Dr. Tomlin. Febrile 101F, pan cx sent. Likely left sided infiltrate on CXR, c/f aspiration PNA. Started on vanc/zosyn. MRSA swab pending.   1/28: Neuro stable, on vanc/zosyn. +UTI on UA, MRSA negative. Vanc dc'd. RVP negative. Zosyn increased to pseudomonal dosing.   1/29: GEORGE overnight, neuro stable.  1/30: GEORGE overnight, neuro stable. Dc'd zosyn due to resistance, switched to Levaquin.  1/31: GEORGE ovenight, neuro stable.   2/1: Brief desat. Improved with neb and reposititioning. ABG and POCUS wnl.   2/4-6: GEORGE   2/7: GEORGE ovn. L eye redness noted, started erythromycin and lacrilube to L eye as well. LR @ 100 cc/hr x 10 hours for uptrending BUN/Cr. Resumed bowel reg.   2/8: GEORGE overnight. Escalated bowel regimen.   2/9: GEORGE overnight.  2/10: GEORGE overnight. Social work to start working on SNF placement. Pending appointment with [employer] [place] [employer] to come to hospital for biometrics.  2/11: GEORGE overnight. Neuro stable. Potassium improved (downtrending).   2/12: GEORGE overnight. Neuro stable. Lokelma 5mg x 1. Decrease FW to 200q8. 1L NS bolus given for hydration, uptrending BUN/Cr. Wound care rec barrier wipes for penile wound.  TFs changed to zanda renal formula per nutrition.  2/13: GEORGE overnight. Neuro stable. Corneal abrasion noted on exam, ophtho re-consulted for L eye, eye drops changed per recs to q4. Pulm contacted for trach exchange, attempted at bedside but patient unable to tolerate with minimal sedation, will plan for tomorrow.   2/14: GEORGE overnight, given 1L of NS for low BP after fentanyl with improvement. Neuro exam stable, pend trach exchange today with pulm. Pend optho assessment for left eye. Trache exchanged. Desaturation during placement to 90%. FiO2 incr'd 55%. CXR negative for pneumothorax.   2/15: GEORGE overnight. Dest'd 88%: suctione, incr'd FiO2, PEEP. CXR/ABG/POCUS done with B-lines, 20mg IV lasix given.   2/16: GEORGE ovn, BAL from 2/13 during trach exchange growing moderate stenotrophomonas maltophilia, desat to 87% red rubber suctioning performed with improvements in O2 sat to 98%, Desturated again to 76%. Deep suctioned, ABG/CXR and lasix, incr. vent settings, Saturating 95%. Pulm rec'd dc mucomyst. Increased clonus while having another episode of desaturation, given 4mg ativan, clonus broke. O2 sats normalized with deep suction. Back on vEEG per gen neuro. CT chest and sputum cx obtained per ID.   2/17: GEORGE ovn. Blood cultures drawn. ID rec'd treatment for stenotrophomonas. EEG with concern for singular seizure activity. Keppra and Phenytoin levels were drawn. 2x blood cultures sent prior to starting DS Bactrim and Minocycline PO as per ID recs. Additional standing ativan added per epilepsy.   2/18: GEORGE ovn. Per epilepsy ativan decreased to 1mg q12. EEG dc'd.  2/19: GEORGE ovn. 500cc bolus for Cr bump over 5 hrs. Dc'd bactrim and minocycline. Start ertapanem as per ID.   2/20: GEORGE overnight, neuro stable. Cr bump likely 2/2 Bactrim, will monitor. TF adjusted off minocycline. DC propranolol and baclofen. Sent photo of L eye to ophtho, increased drops to q4.  2/21: GEORGE overnight, neuro stable. Keep Ertapenem per Dr. Velasco. FENa consistent with pre-renal ANIKA, 1L LR administered 125cc/hr.  2/22: GEORGE overnight, bowel regimen held due to loose stool.   2/23: o/n patient with desat to 80s, improved with suctioning and patient coughing up sputum.  2/24: GEORGE overnight.  2/25: GEORGE overnight. FW increased to 200cc q6.  2/26: GEORGE overnight. Weaned doxazosin to 4mg d/t low blood pressure.  2/27: GEORGE overnight. Desaturated to mid 80s, suctioned and incr'd FiO2, returned to mid 90s. FiO2 back to 50%.   2/28: GEORGE overnight, neuro stable. FiO2 dec to 40%, stable. Propranolol dc'd. Metoprolol for tachycardia, DC amlodipine.  3/1: GEORGE overnight. pt not reacting to noxious stimuli, CTH done, stable from prior, epilepsy consulted d/t concern for seizure. Placed on EEG, +seizure. Given stat vimpat and started vimpat 100mg BID. Phenytoin level within range.   GEORGE overnight. Restart bowel regimen. Patient with 2 additional clinical seizures on vEEG involving R head turning and R arm stiffening. Per epilepsy, 100mg IV vimpat given STAT and standing dose increased to 200mg IV BID.  3/3: GEORGE overnight.     Vital Signs Last 24 Hrs  T(C): 37.1 (02 Mar 2025 21:41), Max: 37.1 (02 Mar 2025 21:41)  T(F): 98.8 (02 Mar 2025 21:41), Max: 98.8 (02 Mar 2025 21:41)  HR: 92 (03 Mar 2025 01:28) (68 - 94)  BP: 108/70 (02 Mar 2025 23:36) (108/70 - 133/93)  BP(mean): 84 (02 Mar 2025 23:36) (84 - 108)  RR: 16 (03 Mar 2025 01:28) (14 - 19)  SpO2: 99% (03 Mar 2025 01:28) (93% - 99%)    Parameters below as of 03 Mar 2025 01:28  Patient On (Oxygen Delivery Method): ventilator    O2 Concentration (%): 40    I&O's Summary    01 Mar 2025 07:01  -  02 Mar 2025 07:00  --------------------------------------------------------  IN: 1582 mL / OUT: 785 mL / NET: 797 mL    02 Mar 2025 07:01  -  03 Mar 2025 02:42  --------------------------------------------------------  IN: 1744 mL / OUT: 1050 mL / NET: 694 mL    PHYSICAL EXAM:  General: NAD, pt is sitting up in bed, trach to AC/VC  HEENT: PERRL 3mm briskly reactive, eyes open to voice, (+) left eye injected  Cardiovascular: RRR, S1, S2  Respiratory: breathing non-labored on AC/VC, chest rise symmetric, +trach   GI: abd soft, NTND, +PEG   Neuro: A&Ox2, squeezes hand to communicate, non-verbal, intermittently follows commands   RUE HG 2/5 to command, RLE wiggles toes to command. LUE 0/5, LLE w/d to noxious  Extremities: distal pulses 2+ x4    DIET:  [] NPO  [] Mechanical  [X] Tube feeds    LABS:                        10.0   6.36  )-----------( 203      ( 01 Mar 2025 09:16 )             29.6     03-02    136  |  100  |  57[H]  ----------------------------<  121[H]  4.0   |  23  |  1.14    Ca    9.4      02 Mar 2025 05:30  Phos  3.9     03-01  Mg     2.3     03-01    TPro  7.1  /  Alb  3.6  /  TBili  <0.2  /  DBili  x   /  AST  12  /  ALT  15  /  AlkPhos  211[H]  03-02      Urinalysis Basic - ( 02 Mar 2025 05:30 )    Color: x / Appearance: x / SG: x / pH: x  Gluc: 121 mg/dL / Ketone: x  / Bili: x / Urobili: x   Blood: x / Protein: x / Nitrite: x   Leuk Esterase: x / RBC: x / WBC x   Sq Epi: x / Non Sq Epi: x / Bacteria: x      CAPILLARY BLOOD GLUCOSE      Drug Levels: [] N/A  Phenytoin Level, Serum: 17.3 ug/mL (03-01 @ 19:25)      Allergies    Allergy Status Unknown    Intolerances      MEDICATIONS:  Antibiotics:    Neuro:  acetaminophen   Oral Liquid .. 650 milliGRAM(s) Oral every 6 hours PRN  lacosamide IVPB 200 milliGRAM(s) IV Intermittent every 12 hours  levETIRAcetam 1500 milliGRAM(s) Oral every 12 hours  LORazepam     Tablet 1 milliGRAM(s) Oral every 12 hours  LORazepam   Injectable 2 milliGRAM(s) IV Push once PRN  phenytoin   Suspension 150 milliGRAM(s) Oral <User Schedule>  phenytoin   Suspension 200 milliGRAM(s) Oral <User Schedule>    Anticoagulation:  heparin   Injectable 5000 Unit(s) SubCutaneous every 8 hours    OTHER:  albuterol/ipratropium for Nebulization 3 milliLiter(s) Nebulizer every 4 hours  artificial tears (preservative free) Ophthalmic Solution 1 Drop(s) Both EYES every 4 hours  chlorhexidine 0.12% Liquid 15 milliLiter(s) Oral Mucosa every 12 hours  doxazosin 4 milliGRAM(s) Oral at bedtime  erythromycin   Ointment 1 Application(s) Both EYES every 4 hours  fluticasone propionate 50 MICROgram(s)/spray Nasal Spray 1 Spray(s) Both Nostrils two times a day  influenza   Vaccine 0.5 milliLiter(s) IntraMuscular once  metoprolol tartrate 12.5 milliGRAM(s) Oral every 12 hours  pantoprazole   Suspension 40 milliGRAM(s) Oral daily  petrolatum Ophthalmic Ointment 1 Application(s) Both EYES every 4 hours  polyethylene glycol 3350 17 Gram(s) Oral daily  senna 2 Tablet(s) Oral at bedtime  sodium chloride 0.65% Nasal 1 Spray(s) Both Nostrils two times a day    CULTURES:  Culture Results:   No growth at 5 days (02-17 @ 12:10)  Culture Results:   No growth at 5 days (02-17 @ 12:10)    ASSESSMENT:  44M PMH ?stroke/MI present to [facility] after collapsing at work. Decorticate posturing, vomiting, intubated for airway protection. 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 Transferred to [facility] for further management. s/p trach 10/14. s/p peg 10/21. Re-upgrade to ICU 2/2 desaturation event and suctioning requirements 11/15. Re-upgrade to NSICU 12/15 2/2 desaturation and tachycardia.    AMS    CVA (cerebral vascular accident)  Intracerebral hemorrhage of brain stem  Brainstem stroke  Brain stem stroke syndrome  Percutaneous tracheal puncture  Hemorrhagic stroke  Brainstem stroke  Pontine hemorrhage  Neurogenic dysphagia  Chronic respiratory failure  Acute kidney injury superimposed on CKD  Acute urinary retention  Hypertensive emergency  Sepsis, unspecified organism  Sepsis  Gram-negative bacteremia  Dyspnea  Percutaneous tracheal puncture  Altered mental status examination  EGD, with PEG  AMS  Room Service Assist  EGD, with PEG  Functional quadriplegia  Neurogenic dysphagia  Chronic respiratory failure  Hypertension  Seizures    PLAN:  Neuro:  - neuro/vitals q4h  - pain control: tylenol prn  - seizure tx: keppra 1500mg BID, phenytoin 150/150/200, ativan 1mg q12, vimpat IV  200mg BID   - vEEG (3/1- ), s/p vEEG: +seizure on 2/17, 3/1  - CTH 9/30: enlarged pontine hemorrhage, CTH 10/3: stable, CTH 10/25: mostly resolved pontine hemorrhage, CTH 12/15: R mastoid air cell opacification; acute otitis media vs sterile effusion, CTH 12/18: stable. CTH 1/21 stable, CTH 1/27 stable, CTH 3/1: stable   - MRI brain 10/12: parenchymal hemorrhage, acute/subacute R cerebellar stroke    CV:  - -160  - HTN/tachycardia: Metoprolol 12.5 mg BID   - echo (9/30) EF 75%, repeat 12/16: 57%  - OH 3/2: 196  PULM:  - s/p trach exchange with pulm at bedside 2/14 to vent, AC/VC 40/400/14/6  - Secretions: duonebs/chest PT q4h  - CT chest 2/17: Near complete collapse b/l lower lobes. Patchy opacity within LLL/ALONDRA  - pulmonology consulted, recs appreciated  GI:  - TFs via PEG, [employer] renal  -bowel regimen, last BM 3/1  Renal:  - IVL  - FW 200q6 for hydration  - Voiding, SC prn  - CKD: trend BUN/Cr  - renal US 10/1, 10/8, 1/15: Increased bilateral renal echogenicity consistent with medical renal disease  - urinary retention: doxazosin 4mg at bedtime   Endo:  - A1c 5.4  Heme:  - DVT ppx: SCDs, SQH 5000u q8h   - LE dopplers negative 12/16  ID:  - Afebrile, last pancx 2/17  - sputum 2/16 +Enterobacter cloacae: s/p Ertapenem (2/19-23) per ID recs, conolonized with stenotrophomonas  - empiric PNA: cefepime (12/22-12/30), s/p vanc (12/22-12/23), s/p zosyn (12/15-12/20), s/p vanc (12/15-12/16), s/p zosyn (1/12 -1/18), s/p DS bactrim 2 tabs TID, 200 mg minocycline BID (2/18)  - Hx +klebsiella UTI & bacteremia, +stenotrophomopnas maltophilia PNA, +Enterobacter cloacae PNA  MISC:  - BL keratitis: BL erythromycin ointment, artificial tears, lacrilube q4, moisture chamber   - Penile wound: wound care rec barrier wipes   Dispo: SDU status, DNR, pending SNF  D/w Dr. D'Amico

## 2025-03-03 NOTE — PROGRESS NOTE ADULT - ASSESSMENT
44M with unclear PMH (?stroke, MI) who was found down at work, intubated for airway protection and found to have acute parenchymal hemorrhage within nabil with mass effect (+ acute/subacute right cerebellar infarct) in setting of hypertensive emergency, transferred to [facility] for further neurosurgical care. Hospital course c/b poor neurologic recovery s/p trach-PEG, AUR s/p gabriel, ANIKA on CKD c/b hyperkalemia, HAP s/p amp-sulbactam (EOT 10/23), K aerogenes bacteremia  treated with 2 weeks of Cefepime per ID , On 11/15 he became septic and was transferred to NSICU due to increased o2 requirements and needed Vent support , Trach Cultures + for Stenotrophomonas which was treated with 7 days of Bactrim per ID , has been weaned of Vent since 11/23 and is now on 10lts at 40% o2 through Trach Collar. Stepped up to the NSICU on 12/15 for hypoxia and tachycardia. Pt s/p  abx for 5 days. Pt now stepped down to [facility].      # Pontine hemorrhage  # Encephalopathy due to Intracranial Bleed   # Seizure   - Acute parenchymal hemorrhage within nabil with mass effect (+ acute/subacute right cerebellar infarct) in setting of hypertensive emergency.   - MRI brain also demonstrated acute/subacute R cerebellar stroke.   - No Neurosurgical Interventions were performed   - Trach, Vent and PEG Dependent    # Seizures  - Continue Seizure precautions   - Continue Keppra and phenytoin  -Pt was seen by Epilepsy team on 3/1 and pt was loaded on Vimpat then placed on standing Vimpat 100mg BID. Overnight was increased to 200 mg q12h, remains on EEG monitoring    # Hypertension  - Amlodipine d/c'ed on 2/28   - Continue Doxazosin     # Acute on Chronic respiratory failure   - Continue vent support and chest PT.    # Neurogenic dysphagia.   - Pt s/p PEG placement by  surgery 10/21/24  - Continue TF per nutrition  - Continue aspiration precautions and elevation of HOB.    # Urinary retention  # BPH   - doxazosin 4mg qHS   - [place]/Condom Catheter     # Functional quadriplegia in setting of brainstem hemorrhage  - decub precautions  - care per nursing protocol     # CKD stage III   - Continue Free water via PEG @ 200q6h  - Continue to monitor creatinine, avoid nephrotoxics     # Anemia of Chronic Disease   -Will continue to monitor H/H stable ~10     DVT ppx: SQH  Discussed with primary team      44M with unclear PMH (?stroke, MI) who was found down at work, intubated for airway protection and found to have acute parenchymal hemorrhage within nabil with mass effect (+ acute/subacute right cerebellar infarct) in setting of hypertensive emergency, transferred to [facility] for further neurosurgical care. Hospital course c/b poor neurologic recovery s/p trach-PEG, AUR s/p gabriel, ANIKA on CKD c/b hyperkalemia, HAP s/p amp-sulbactam (EOT 10/23), K aerogenes bacteremia  treated with 2 weeks of Cefepime per ID , On 11/15 he became septic and was transferred to NSICU due to increased o2 requirements and needed Vent support , Trach Cultures + for Stenotrophomonas which was treated with 7 days of Bactrim per ID , has been weaned of Vent since 11/23 and is now on 10lts at 40% o2 through Trach Collar. Stepped up to the NSICU on 12/15 for hypoxia and tachycardia. Pt s/p  abx for 5 days. Pt now stepped down to 8[facility].      # Pontine hemorrhage  # Encephalopathy due to Intracranial Bleed   # Seizure   - Acute parenchymal hemorrhage within nabil with mass effect (+ acute/subacute right cerebellar infarct) in setting of hypertensive emergency.   - MRI brain also demonstrated acute/subacute R cerebellar stroke.   - No Neurosurgical Interventions were performed   - Trach, Vent and PEG Dependent    # Seizures  - Continue Seizure precautions   - Continue Keppra and phenytoin  - Pt was seen by Epilepsy team on 3/1 and pt was loaded on Vimpat then placed on standing Vimpat 100mg BID. Overnight was increased to 200 mg q12h, remains on EEG monitoring  - Recommend starting Gabapentin 300 mg TID    # Hypertension  - Amlodipine d/c'ed on 2/28   - Continue Doxazosin     # Acute on Chronic respiratory failure   - Continue vent support and chest PT.    # Neurogenic dysphagia.   - Pt s/p PEG placement by  surgery 10/21/24  - Continue TF per nutrition  - Continue aspiration precautions and elevation of HOB.    # Urinary retention  # BPH   - doxazosin 4mg qHS   - [place]/Condom Catheter     # Functional quadriplegia in setting of brainstem hemorrhage  - decub precautions  - care per nursing protocol     # CKD stage III   - Continue Free water via PEG @ 200q6h  - Continue to monitor creatinine, avoid nephrotoxics     # Anemia of Chronic Disease   -Will continue to monitor H/H stable ~10     DVT ppx: SQH  Discussed with primary team      44M with unclear PMH (?stroke, MI) who was found down at work, intubated for airway protection and found to have acute parenchymal hemorrhage within nabil with mass effect (+ acute/subacute right cerebellar infarct) in setting of hypertensive emergency, transferred to [facility] for further neurosurgical care. Hospital course c/b poor neurologic recovery s/p trach-PEG, AUR s/p gabriel, ANIKA on CKD c/b hyperkalemia, HAP s/p amp-sulbactam (EOT 10/23), K aerogenes bacteremia  treated with 2 weeks of Cefepime per ID , On 11/15 he became septic and was transferred to NSICU due to increased o2 requirements and needed Vent support , Trach Cultures + for Stenotrophomonas which was treated with 7 days of Bactrim per ID , has been weaned of Vent since 11/23 and is now on 10lts at 40% o2 through Trach Collar. Stepped up to the NSICU on 12/15 for hypoxia and tachycardia. Pt s/p  abx for 5 days. Pt now stepped down to 8[facility].      # Pontine hemorrhage  # Encephalopathy due to Intracranial Bleed   # Seizure   - Acute parenchymal hemorrhage within nabil with mass effect (+ acute/subacute right cerebellar infarct) in setting of hypertensive emergency.   - MRI brain also demonstrated acute/subacute R cerebellar stroke.   - No Neurosurgical Interventions were performed   - Trach, Vent and PEG Dependent    # Seizures  - Continue Seizure precautions   - Continue Keppra and phenytoin  - Pt was seen by Epilepsy team on 3/1 and pt was loaded on Vimpat then placed on standing Vimpat 100mg BID. Overnight was increased to 200 mg q12h, remains on EEG monitoring      # Hypertension  - Amlodipine d/c'ed on 2/28   - Continue Doxazosin     # Acute on Chronic respiratory failure   - Continue vent support and chest PT.    # Neurogenic dysphagia.   - Pt s/p PEG placement by  surgery 10/21/24  - Continue TF per nutrition  - Continue aspiration precautions and elevation of HOB.    # Urinary retention  # BPH   - doxazosin 4mg qHS   - [place]/Condom Catheter     # Functional quadriplegia in setting of brainstem hemorrhage  - decub precautions  - care per nursing protocol     # CKD stage III   - Continue Free water via PEG @ 200q6h  - Continue to monitor creatinine, avoid nephrotoxics     # Anemia of Chronic Disease   -Will continue to monitor H/H stable ~10     DVT ppx: SQH  Discussed with primary team

## 2025-03-04 LAB — LEVETIRACETAM SERPL-MCNC: 30.3 UG/ML — SIGNIFICANT CHANGE UP (ref 10–40)

## 2025-03-04 PROCEDURE — 95720 EEG PHY/QHP EA INCR W/VEEG: CPT

## 2025-03-04 PROCEDURE — 99232 SBSQ HOSP IP/OBS MODERATE 35: CPT

## 2025-03-04 PROCEDURE — 99233 SBSQ HOSP IP/OBS HIGH 50: CPT

## 2025-03-04 RX ADMIN — Medication 15 MILLILITER(S): at 18:24

## 2025-03-04 RX ADMIN — HEPARIN SODIUM 5000 UNIT(S): 1000 INJECTION INTRAVENOUS; SUBCUTANEOUS at 14:37

## 2025-03-04 RX ADMIN — HEPARIN SODIUM 5000 UNIT(S): 1000 INJECTION INTRAVENOUS; SUBCUTANEOUS at 05:14

## 2025-03-04 RX ADMIN — Medication 40 MILLIGRAM(S): at 11:17

## 2025-03-04 RX ADMIN — Medication 1 APPLICATION(S): at 18:23

## 2025-03-04 RX ADMIN — Medication 1 APPLICATION(S): at 22:44

## 2025-03-04 RX ADMIN — Medication 1 MILLIGRAM(S): at 05:15

## 2025-03-04 RX ADMIN — IPRATROPIUM BROMIDE AND ALBUTEROL SULFATE 3 MILLILITER(S): .5; 2.5 SOLUTION RESPIRATORY (INHALATION) at 10:45

## 2025-03-04 RX ADMIN — Medication 150 MILLIGRAM(S): at 03:25

## 2025-03-04 RX ADMIN — Medication 1 APPLICATION(S): at 10:41

## 2025-03-04 RX ADMIN — ERYTHROMYCIN 1 APPLICATION(S): 5 OINTMENT OPHTHALMIC at 05:14

## 2025-03-04 RX ADMIN — HEPARIN SODIUM 5000 UNIT(S): 1000 INJECTION INTRAVENOUS; SUBCUTANEOUS at 22:41

## 2025-03-04 RX ADMIN — FLUTICASONE PROPIONATE 1 SPRAY(S): 50 SPRAY, METERED NASAL at 18:24

## 2025-03-04 RX ADMIN — DOXAZOSIN MESYLATE 4 MILLIGRAM(S): 8 TABLET ORAL at 22:44

## 2025-03-04 RX ADMIN — LACOSAMIDE 140 MILLIGRAM(S): 150 TABLET, FILM COATED ORAL at 11:18

## 2025-03-04 RX ADMIN — GABAPENTIN 300 MILLIGRAM(S): 400 CAPSULE ORAL at 14:37

## 2025-03-04 RX ADMIN — ERYTHROMYCIN 1 APPLICATION(S): 5 OINTMENT OPHTHALMIC at 14:32

## 2025-03-04 RX ADMIN — ERYTHROMYCIN 1 APPLICATION(S): 5 OINTMENT OPHTHALMIC at 01:06

## 2025-03-04 RX ADMIN — Medication 1 APPLICATION(S): at 01:05

## 2025-03-04 RX ADMIN — GABAPENTIN 300 MILLIGRAM(S): 400 CAPSULE ORAL at 22:42

## 2025-03-04 RX ADMIN — IPRATROPIUM BROMIDE AND ALBUTEROL SULFATE 3 MILLILITER(S): .5; 2.5 SOLUTION RESPIRATORY (INHALATION) at 05:15

## 2025-03-04 RX ADMIN — Medication 1 DROP(S): at 14:32

## 2025-03-04 RX ADMIN — LACOSAMIDE 140 MILLIGRAM(S): 150 TABLET, FILM COATED ORAL at 23:09

## 2025-03-04 RX ADMIN — LEVETIRACETAM 1500 MILLIGRAM(S): 10 INJECTION, SOLUTION INTRAVENOUS at 05:15

## 2025-03-04 RX ADMIN — Medication 1 SPRAY(S): at 18:24

## 2025-03-04 RX ADMIN — LEVETIRACETAM 1500 MILLIGRAM(S): 10 INJECTION, SOLUTION INTRAVENOUS at 18:24

## 2025-03-04 RX ADMIN — Medication 1 SPRAY(S): at 05:15

## 2025-03-04 RX ADMIN — Medication 1 DROP(S): at 05:13

## 2025-03-04 RX ADMIN — IPRATROPIUM BROMIDE AND ALBUTEROL SULFATE 3 MILLILITER(S): .5; 2.5 SOLUTION RESPIRATORY (INHALATION) at 18:24

## 2025-03-04 RX ADMIN — ERYTHROMYCIN 1 APPLICATION(S): 5 OINTMENT OPHTHALMIC at 10:41

## 2025-03-04 RX ADMIN — FLUTICASONE PROPIONATE 1 SPRAY(S): 50 SPRAY, METERED NASAL at 05:17

## 2025-03-04 RX ADMIN — IPRATROPIUM BROMIDE AND ALBUTEROL SULFATE 3 MILLILITER(S): .5; 2.5 SOLUTION RESPIRATORY (INHALATION) at 22:41

## 2025-03-04 RX ADMIN — Medication 2 TABLET(S): at 22:43

## 2025-03-04 RX ADMIN — METOPROLOL SUCCINATE 12.5 MILLIGRAM(S): 50 TABLET, EXTENDED RELEASE ORAL at 05:16

## 2025-03-04 RX ADMIN — Medication 1 DROP(S): at 10:42

## 2025-03-04 RX ADMIN — GABAPENTIN 300 MILLIGRAM(S): 400 CAPSULE ORAL at 05:17

## 2025-03-04 RX ADMIN — IPRATROPIUM BROMIDE AND ALBUTEROL SULFATE 3 MILLILITER(S): .5; 2.5 SOLUTION RESPIRATORY (INHALATION) at 01:08

## 2025-03-04 RX ADMIN — Medication 1 APPLICATION(S): at 14:32

## 2025-03-04 RX ADMIN — ERYTHROMYCIN 1 APPLICATION(S): 5 OINTMENT OPHTHALMIC at 18:24

## 2025-03-04 RX ADMIN — IPRATROPIUM BROMIDE AND ALBUTEROL SULFATE 3 MILLILITER(S): .5; 2.5 SOLUTION RESPIRATORY (INHALATION) at 14:36

## 2025-03-04 RX ADMIN — Medication 1 DROP(S): at 18:25

## 2025-03-04 RX ADMIN — Medication 15 MILLILITER(S): at 05:14

## 2025-03-04 RX ADMIN — Medication 1 DROP(S): at 22:44

## 2025-03-04 RX ADMIN — ERYTHROMYCIN 1 APPLICATION(S): 5 OINTMENT OPHTHALMIC at 22:42

## 2025-03-04 RX ADMIN — Medication 150 MILLIGRAM(S): at 11:17

## 2025-03-04 RX ADMIN — METOPROLOL SUCCINATE 12.5 MILLIGRAM(S): 50 TABLET, EXTENDED RELEASE ORAL at 18:25

## 2025-03-04 RX ADMIN — Medication 1 APPLICATION(S): at 05:14

## 2025-03-04 RX ADMIN — Medication 200 MILLIGRAM(S): at 19:46

## 2025-03-04 RX ADMIN — Medication 1 DROP(S): at 01:08

## 2025-03-04 RX ADMIN — POLYETHYLENE GLYCOL 3350 17 GRAM(S): 17 POWDER, FOR SOLUTION ORAL at 11:17

## 2025-03-04 NOTE — CHART NOTE - NSCHARTNOTEFT_GEN_A_CORE
Admitting Diagnosis:   Patient is a 44y old  Male who presents with a chief complaint of brain stem bleed (04 Mar 2025 10:36)  PAST MEDICAL & SURGICAL HISTORY:  Acute myocardial infarction  CVA (cerebral vascular accident)      Current Nutrition Order: [employer] renal 1.8 formula via gastrostomy: at goal of 59mL/hour x 18 hours; this provides ~1062mL total volume, 1912 calories, ~85g protein, ~701mL free water; this meets ~% calorie needs, % of protein needs    PO Intake: N/A related to NPO status with tube feedings    GI Issues: last BM 3/1/25 per nursing (fecal incontinence); abdomen soft/nontender; bowel sounds present, pt is ordered for a bowel regimen. No GI upset noted.     Pain: No non-verbal indicators of pain noted in flowsheets.    Skin Integrity:  Mookie score 12  generalized, trace (1+) edema  bruised (ecchymosis)  PEG remains    03-03-25 @ 07:01  -  03-04-25 @ 07:00  --------------------------------------------------------  IN: 1803 mL / OUT: 1350 mL / NET: 453 mL    03-04-25 @ 07:01  -  03-04-25 @ 13:11  --------------------------------------------------------  IN: 617 mL / OUT: 150 mL / NET: 467 mL    Labs:   03-03    138  |  101  |  56[H]  ----------------------------<  138[H]  4.0   |  25  |  1.12    Ca    9.3      03 Mar 2025 05:30  Phos  3.5     03-03  Mg     2.5     03-03    CAPILLARY BLOOD GLUCOSE    Medications:  MEDICATIONS  (STANDING):  albuterol/ipratropium for Nebulization 3 milliLiter(s) Nebulizer every 4 hours  artificial tears (preservative free) Ophthalmic Solution 1 Drop(s) Both EYES every 4 hours  chlorhexidine 0.12% Liquid 15 milliLiter(s) Oral Mucosa every 12 hours  doxazosin 4 milliGRAM(s) Oral at bedtime  erythromycin   Ointment 1 Application(s) Both EYES every 4 hours  fluticasone propionate 50 MICROgram(s)/spray Nasal Spray 1 Spray(s) Both Nostrils two times a day  gabapentin 300 milliGRAM(s) Oral every 8 hours  heparin   Injectable 5000 Unit(s) SubCutaneous every 8 hours  influenza   Vaccine 0.5 milliLiter(s) IntraMuscular once  lacosamide IVPB 200 milliGRAM(s) IV Intermittent every 12 hours  levETIRAcetam 1500 milliGRAM(s) Oral every 12 hours  metoprolol tartrate 12.5 milliGRAM(s) Oral every 12 hours  pantoprazole   Suspension 40 milliGRAM(s) Oral daily  petrolatum Ophthalmic Ointment 1 Application(s) Both EYES every 4 hours  phenytoin   Suspension 150 milliGRAM(s) Oral <User Schedule>  phenytoin   Suspension 200 milliGRAM(s) Oral <User Schedule>  polyethylene glycol 3350 17 Gram(s) Oral daily  senna 2 Tablet(s) Oral at bedtime  sodium chloride 0.65% Nasal 1 Spray(s) Both Nostrils two times a day    MEDICATIONS  (PRN):  acetaminophen   Oral Liquid .. 650 milliGRAM(s) Oral every 6 hours PRN Temp greater or equal to 38C (100.4F), Mild Pain (1 - 3)  LORazepam   Injectable 2 milliGRAM(s) IV Push once PRN Seizure Activity (NOTIFY PROVIDER PRIOR TO GIVING)    Dosing Anthropometrics:   Height for BMI (FEET)	5 Feet  Height for BMI (INCHES)	8 Inch(s)  Height for BMI (CM)	172.72 Centimeter(s)  Weight for BMI (lbs)	176.4 lb  Weight for BMI (kg)	80 kg  Body Mass Index	              26.8  ideal body weight:               154 pounds / 70 kg   % ideal body weight             114%     Weight Change: Recommend nursing to obtain weekly wt to track/trend changes.   RD obtained bedweight today (1/23/25) of ~79.5kg, consistent with admission weight. RD to follow up with nursing.   RD obtained bedweight today (2/4/25) of ~76.1kg, consistent with admission weight. RD to follow up with nursing.   RD obtained bedweight today (2/11/25) of ~76.2kg, not significantly decreased compared to admission wt.  RD obtained bedweight today (3/4/25) of ~77kg, not significantly different compared to admission wt.    Estimated energy needs:   Weight used for calculations: most recent actual body weight  weight: 76.1kg (~167%)  calories: 25-30 calories/kg = ~9431-0905 calories/day  protein: 1.1-1.4g protein/kg = ~84-107g protein/day  fluids: 1mL/kcal = ~1903-2283mL/day  Other Calculations	Pt is ~% ideal body weight (~109%), thus actual body weight used for all calculations. Needs adjusted for age, clinical course, ventilation.     Subjective:   This is a 44 year old male, unknown past medical history (reported stroke and MI by coworkers) presented to [facility] with AMS, Pt was working at [employer] when he was found down by coworkers. EMS called and pt brought to [facility] ED. Intubated, sedated, started on cardene for SBPs in 200s. CT head showed brain stem bleed. Transferred to NSICU for further management. Pt is status post tracheostomy 10/14. Pt is status post PEG on 10/21. Re-upgrade to ICU in the setting of desaturation event and suctioning requirements 11/15. Re-upgrade to NSICU 12/15 2/2 desaturation and tachycardia. Pt has since been stepped down to [facility].     Patient seen at bedside on [facility] for nutrition follow up. Pt afebrile, vented to VC-AC, SpO2 96-98%, not sedated, not on pressors. Pt lethargic per nursing, pt continues to have a tracheostomy. RD spoke to nursing on the unit, pt was resting in bed. Current diet order: NPO with tube feeds of [employer] Renal formula, at a goal rate of 59mL/hour for 18 hours, pt is meeting ~100% estimated nutrient needs. GI within normal limits at this time. Labs and medications reviewed. Notable labs 3/4/25: BUN 56 (high), ALP on 3/2/25 211 (high), other labs within normal limits, medical team is aware, RD to monitor trends. RD will continue to follow, please see nutrition recommendations below.     Previous Nutrition Diagnosis: Inadequate Oral Intake related to inability to meet nutritional demands via PO as evidenced by NPO with tube feedings    Active [ x ]  Resolved [   ]     Goal: Pt will consume >/= 75% of estimated nutrient needs via tolerated route     Nutrition Recommendations:  1. Continue [employer] renal via gastrostomy, goal of 60mL/hour x 18 hours, providing 1080mL total volume, 1944 calories, 86g protein, ~713mL free water; this meets ~24 calories/kg ideal body weight, ~1.1g protein/kg ideal body weight**  **Maintain aspiration precautions at all times; monitor for signs/symptoms of intolerance**  2. Monitor weights (weekly), GI function, skin integrity; electrolytes, BMP, glucose, CBC per MD discretion *renal indices*  3. Pain and bowel regimen per medical team  4. Recommend nursing to obtain new weights to track/trend changes  5. Align nutrition with goals of care at all times    Education: Deferred at this time related to pt resting in bed, and cognitive status.     Risk Level: High [  ] Moderate [ x ] Low [   ].

## 2025-03-04 NOTE — PROGRESS NOTE ADULT - ASSESSMENT
44M with unclear PMH (?stroke, MI) who was found down at work, intubated for airway protection and found to have acute parenchymal hemorrhage within nabil with mass effect (+ acute/subacute right cerebellar infarct) in setting of hypertensive emergency, transferred to [facility] for further neurosurgical care. Hospital course c/b poor neurologic recovery s/p trach-PEG, AUR s/p gabriel, ANIKA on CKD c/b hyperkalemia, HAP s/p amp-sulbactam (EOT 10/23), K aerogenes bacteremia  treated with 2 weeks of Cefepime per ID , On 11/15 he became septic and was transferred to NSICU due to increased o2 requirements and needed Vent support , Trach Cultures + for Stenotrophomonas which was treated with 7 days of Bactrim per ID , has been weaned of Vent since 11/23 and is now on 10lts at 40% o2 through Trach Collar. Stepped up to the NSICU on 12/15 for hypoxia and tachycardia. Pt s/p  abx for 5 days. Pt now stepped down to [facility].      # Pontine hemorrhage  # Encephalopathy due to Intracranial Bleed   # Seizure   - Acute parenchymal hemorrhage within nabil with mass effect (+ acute/subacute right cerebellar infarct) in setting of hypertensive emergency.   - MRI brain also demonstrated acute/subacute R cerebellar stroke.   - No Neurosurgical Interventions were performed   - Trach, Vent and PEG Dependent    # Seizures  - On Levetiracetam 1500 mg q12h, Phenytoin 200 mg daily and w Vimpat 200 mg q12h. Gabapentin 300 mg q8h started. Remains on EEG, Epilepsy team following   - Seizure precautions     # Hypertension  - On Metoprolol and Doxazosin     # Acute on Chronic respiratory failure   - Continue vent support and chest PT    # Neurogenic dysphagia.   - s/p PEG placement by surgery 10/21/24  - Continue TF per nutrition  - Continue aspiration precautions and elevation of HOB.    # Urinary retention  # BPH   - doxazosin 4mg qHS   - [place] Catheter     # Functional quadriplegia in setting of brainstem hemorrhage  - decub precautions  - care per nursing protocol     # CKD stage III   - Continue Free water via PEG @ 200q6h  - Continue to monitor creatinine, avoid nephrotoxics     # Anemia of Chronic Disease   -Will continue to monitor H/H stable ~10     DVT ppx: SQH  Discussed with primary team

## 2025-03-04 NOTE — PROGRESS NOTE ADULT - ASSESSMENT
44 year-old-male with unclear PMH (?stroke, MI) who was found down at work, intubated for airway protection and found to have acute large parenchymal hemorrhage within nabil with mass effect (+ acute/subacute right cerebellar infarct) in setting of hypertensive emergency, and R cerebellar stroke transferred to [facility] for further neurosurgical care. Epilepsy called back on 3/1 for concern of seizure contributing to worsening mental status as repeat CTH was negative. Previous vEEGs were notable for right quadrant anterior seizures and epileptiform potential. Patient has long standing intermittent facial twitching at times not correlated to seizures as per chart review.  3/1 EEG showed electroclinical seizures in which his head was turning towards right side with R arm extension and correlated with EEG findings of evolving rhythmic L frontotemporal delta. EEG improved after Vimpat loading and maintenance dose. Today on exam, he is more awake and follows some simple commands however has persistent right rhythmic lower facial and leg twitching vEEG overnight showed multiple event( right face twitching, left face/lip irregular twitching, right arm shaking) were captured with EMG artifact over right fronto-temporal and EEG showed rhythmic delta/theta activity with evolution in frequency which is suspicious for clinical surface negative focal seizures vs movement disorder. Patient less responsive after added gabapentin. Will d/c Ativan for 24 hours to reevaluate neurologically and with vEEG.    Recommendations:  - c/w VEEG for now  - d/c Ativan 1 mg q12h tonight and tomorrow morning and restart it tomorrow night (24 hours without medication) to do evaluation tomorrow  - Continue gabapentin 300 mg q8h  - Continue Vimpat 200mg BID  - Continue Keppra 1500mg Q12hrs   - Continue Phenytoin to 150/150/200mg   - please obtain EKG to check for any conduction abnormalities ([place] prolongation) as Vimpat was started yesterday  - repeat MRI brain w/wo c+ it primary team in agreement.  - f/u keppra levels (drawn on 3/2)  - Maintain seizure/fall/aspiration precautions  - Rest of the management per primary team     Case discussed with Dr. Farley 44 year-old-male with unclear PMH (?stroke, MI) who was found down at work, intubated for airway protection and found to have acute large parenchymal hemorrhage within nabil with mass effect (+ acute/subacute right cerebellar infarct) in setting of hypertensive emergency, and R cerebellar stroke transferred to [facility] for further neurosurgical care. Epilepsy called back on 3/1 for concern of seizure contributing to worsening mental status as repeat CTH was negative. Previous vEEGs were notable for right quadrant anterior seizures and epileptiform potential. Patient has long standing intermittent facial twitching at times not correlated to seizures as per chart review.  3/1 EEG showed electroclinical seizures in which his head was turning towards right side with R arm extension and correlated with EEG findings of evolving rhythmic L frontotemporal delta. EEG improved after Vimpat loading and maintenance dose. Today on exam, he is more awake and follows some simple commands however has persistent right rhythmic lower facial and leg twitching vEEG overnight showed multiple event( right face twitching, left face/lip irregular twitching, right arm shaking) were captured with EMG artifact over right fronto-temporal and EEG showed rhythmic delta/theta activity with evolution in frequency which is suspicious for clinical surface negative focal seizures vs movement disorder. Patient less responsive after added gabapentin. Will d/c Ativan for 24 hours to reevaluate neurologically and with vEEG.    Recommendations:  - c/w VEEG for now  - d/c Ativan 1 mg q12h tonight and tomorrow morning and restart it tomorrow night (24 hours without medication) to do evaluation tomorrow  - Continue gabapentin 300 mg q8h  - Continue Vimpat 200mg BID  - Continue Keppra 1500mg Q12hrs   - Continue Phenytoin to 150/150/200mg   - please obtain EKG to check for any conduction abnormalities ([place] prolongation) as Vimpat   - repeat MRI brain w/wo c+ it primary team in agreement.  - f/u keppra levels (drawn on 3/2)  - Maintain seizure/fall/aspiration precautions  - Rest of the management per primary team     Case discussed with Dr. Farley

## 2025-03-04 NOTE — PROGRESS NOTE ADULT - SUBJECTIVE AND OBJECTIVE BOX
SUBJECTIVE:  Patient was seen and examined at bedside. Patient started on Gabapentin. This morning, remain son EEG, less facial twitching noticed. No other complaints or events reported    Review of systems: unable to obtain     Diet, NPO with Tube Feed:   Tube Feeding Modality: Gastrostomy  Maribeth Sauceda Renal  Total Volume for 24 Hours (mL): 1062  Total Number of Cans: 5  Continuous  Starting Tube Feed Rate mL per Hour: 59  Increase Tube Feed Rate by (mL): 0     Every 4 hours  Until Goal Tube Feed Rate (mL per Hour): 59  Tube Feed Duration (in Hours): 18  Tube Feed Start Time: 11:00  Tube Feed Stop Time: 05:00 (02-20-25 @ 10:42) [Active]      MEDICATIONS:  MEDICATIONS  (STANDING):  albuterol/ipratropium for Nebulization 3 milliLiter(s) Nebulizer every 4 hours  artificial tears (preservative free) Ophthalmic Solution 1 Drop(s) Both EYES every 4 hours  chlorhexidine 0.12% Liquid 15 milliLiter(s) Oral Mucosa every 12 hours  doxazosin 4 milliGRAM(s) Oral at bedtime  erythromycin   Ointment 1 Application(s) Both EYES every 4 hours  fluticasone propionate 50 MICROgram(s)/spray Nasal Spray 1 Spray(s) Both Nostrils two times a day  gabapentin 300 milliGRAM(s) Oral every 8 hours  heparin   Injectable 5000 Unit(s) SubCutaneous every 8 hours  influenza   Vaccine 0.5 milliLiter(s) IntraMuscular once  lacosamide IVPB 200 milliGRAM(s) IV Intermittent every 12 hours  levETIRAcetam 1500 milliGRAM(s) Oral every 12 hours  LORazepam     Tablet 1 milliGRAM(s) Oral every 12 hours  metoprolol tartrate 12.5 milliGRAM(s) Oral every 12 hours  pantoprazole   Suspension 40 milliGRAM(s) Oral daily  petrolatum Ophthalmic Ointment 1 Application(s) Both EYES every 4 hours  phenytoin   Suspension 150 milliGRAM(s) Oral <User Schedule>  phenytoin   Suspension 200 milliGRAM(s) Oral <User Schedule>  polyethylene glycol 3350 17 Gram(s) Oral daily  senna 2 Tablet(s) Oral at bedtime  sodium chloride 0.65% Nasal 1 Spray(s) Both Nostrils two times a day    MEDICATIONS  (PRN):  acetaminophen   Oral Liquid .. 650 milliGRAM(s) Oral every 6 hours PRN Temp greater or equal to 38C (100.4F), Mild Pain (1 - 3)  LORazepam   Injectable 2 milliGRAM(s) IV Push once PRN Seizure Activity (NOTIFY PROVIDER PRIOR TO GIVING)      Allergies    Allergy Status Unknown    Intolerances        OBJECTIVE:  Vital Signs Last 24 Hrs  T(C): 36.6 (04 Mar 2025 08:54), Max: 36.8 (03 Mar 2025 13:58)  T(F): 97.9 (04 Mar 2025 08:54), Max: 98.3 (03 Mar 2025 13:58)  HR: 84 (04 Mar 2025 09:03) (74 - 93)  BP: 106/72 (04 Mar 2025 09:03) (106/69 - 124/79)  BP(mean): 85 (04 Mar 2025 09:03) (83 - 99)  RR: 14 (04 Mar 2025 09:03) (14 - 22)  SpO2: 98% (04 Mar 2025 09:03) (96% - 100%)    Parameters below as of 04 Mar 2025 09:03  Patient On (Oxygen Delivery Method): ventilator    O2 Concentration (%): 40  I&O's Summary    03 Mar 2025 07:01  -  04 Mar 2025 07:00  --------------------------------------------------------  IN: 1803 mL / OUT: 1350 mL / NET: 453 mL    04 Mar 2025 07:01  -  04 Mar 2025 10:41  --------------------------------------------------------  IN: 120 mL / OUT: 50 mL / NET: 70 mL        PHYSICAL EXAM:  General: unresponsive, on vent, no labored breathing, looking comfortable  HEENT: less twitching in face, EEG leads on place trach clean   Lungs: limited anterior, no crackles, no wheezes  Abdomen: PEG, soft, no distension, no visible tenderness, + BS  Extremities: warm, no edema, not following commands     LABS:                        10.1   6.89  )-----------( 237      ( 03 Mar 2025 05:30 )             31.1     03-03    138  |  101  |  56[H]  ----------------------------<  138[H]  4.0   |  25  |  1.12    Ca    9.3      03 Mar 2025 05:30  Phos  3.5     03-03  Mg     2.5     03-03          CAPILLARY BLOOD GLUCOSE        Urinalysis Basic - ( 03 Mar 2025 05:30 )    Color: x / Appearance: x / SG: x / pH: x  Gluc: 138 mg/dL / Ketone: x  / Bili: x / Urobili: x   Blood: x / Protein: x / Nitrite: x   Leuk Esterase: x / RBC: x / WBC x   Sq Epi: x / Non Sq Epi: x / Bacteria: x        MICRODATA:      RADIOLOGY/OTHER STUDIES:

## 2025-03-04 NOTE — PROGRESS NOTE ADULT - TIME BILLING
Bedside exam and interview   Reviewed vitals, labs,   Discussed patient's plan of care with primary team   Documentation of encounter

## 2025-03-04 NOTE — PROGRESS NOTE ADULT - SUBJECTIVE AND OBJECTIVE BOX
Inteval history: No events overnight. Patient more sedated after starting gabapentin. Now no overbreathing the ventilator and less responsive as per nurse.     MEDICATIONS  (STANDING):  albuterol/ipratropium for Nebulization 3 milliLiter(s) Nebulizer every 4 hours  artificial tears (preservative free) Ophthalmic Solution 1 Drop(s) Both EYES every 4 hours  chlorhexidine 0.12% Liquid 15 milliLiter(s) Oral Mucosa every 12 hours  doxazosin 4 milliGRAM(s) Oral at bedtime  erythromycin   Ointment 1 Application(s) Both EYES every 4 hours  fluticasone propionate 50 MICROgram(s)/spray Nasal Spray 1 Spray(s) Both Nostrils two times a day  gabapentin 300 milliGRAM(s) Oral every 8 hours  heparin   Injectable 5000 Unit(s) SubCutaneous every 8 hours  influenza   Vaccine 0.5 milliLiter(s) IntraMuscular once  lacosamide IVPB 200 milliGRAM(s) IV Intermittent every 12 hours  levETIRAcetam 1500 milliGRAM(s) Oral every 12 hours  metoprolol tartrate 12.5 milliGRAM(s) Oral every 12 hours  pantoprazole   Suspension 40 milliGRAM(s) Oral daily  petrolatum Ophthalmic Ointment 1 Application(s) Both EYES every 4 hours  phenytoin   Suspension 150 milliGRAM(s) Oral <User Schedule>  phenytoin   Suspension 200 milliGRAM(s) Oral <User Schedule>  polyethylene glycol 3350 17 Gram(s) Oral daily  senna 2 Tablet(s) Oral at bedtime  sodium chloride 0.65% Nasal 1 Spray(s) Both Nostrils two times a day    MEDICATIONS  (PRN):  acetaminophen   Oral Liquid .. 650 milliGRAM(s) Oral every 6 hours PRN Temp greater or equal to 38C (100.4F), Mild Pain (1 - 3)  LORazepam   Injectable 2 milliGRAM(s) IV Push once PRN Seizure Activity (NOTIFY PROVIDER PRIOR TO GIVING)    T(C): 36.6 (03-04-25 @ 08:54), Max: 36.8 (03-03-25 @ 13:58)  HR: 90 (03-04-25 @ 12:30) (74 - 93)  BP: 111/78 (03-04-25 @ 12:30) (106/69 - 124/79)  RR: 14 (03-04-25 @ 12:30) (14 - 22)  SpO2: 96% (03-04-25 @ 12:30) (96% - 100%)    Neurological Exam in the morning:  Constitutional: On trach and ventilator  Mental status: Somnolent.  GCS 4t/15 ([id number]) opening and closing eyes to pain. Does not follow commands.  Brainstem reflexes: Left ptosis. No TTB. Corneal reflexes present. Pupillary reflexes with sluggish pupils reactive to light. Oculocephalic present  Respiratory pattern: No overbreathing ventilator  Tone: Reduced R>L  Sensory-motor: Only opening eyes to central and acral pain  Movement disorders: Facial irregular twitching localized to the R side of the face (myoclonic movements) and left eye  DTRs 1+ LE and 2+ UE. No Babinski    Investigations:  CBC Full  -  ( 03 Mar 2025 05:30 )  WBC Count : 6.89 K/uL  RBC Count : 3.20 M/uL  Hemoglobin : 10.1 g/dL  Hematocrit : 31.1 %  Platelet Count - Automated : 237 K/uL  Mean Cell Volume : 97.2 fl  Mean Cell Hemoglobin : 31.6 pg  Mean Cell Hemoglobin Concentration : 32.5 g/dL  Auto Neutrophil # : x  Auto Lymphocyte # : x  Auto Monocyte # : x  Auto Eosinophil # : x  Auto Basophil # : x  Auto Neutrophil % : x  Auto Lymphocyte % : x  Auto Monocyte % : x  Auto Eosinophil % : x  Auto Basophil % : x    03-03    138  |  101  |  56[H]  ----------------------------<  138[H]  4.0   |  25  |  1.12    Ca    9.3      03 Mar 2025 05:30  Phos  3.5     03-03  Mg     2.5     03-03    EEG 3/3   Spontaneous Activity:  No epileptiform discharges     Events:at 12:07 PM, there is evidence of muscle artifact over right fronto-temporal with right arm shaking and right side of face is on pillow and not visible on video  and rhythmic activity with questionable spike wave discharges over left frontotemporal with evolution in frequency, another seizure at 14 PM with left lip.face twitching and EeG showing right fronto-temporal muscle artifact  and left rhythmic activity. During recording he had intermittent irregular left lip twitching and continuous right lip/face twitching    Provocations:  •	Hyperventilation: was not performed.  •	Photic stimulation: was not performed.  Daily Summary:    1)	Multiple event( right face twitching, left face/lip irregular twitching, right arm shaking) were captured with EMG artifact over right fronto-temporal and EEG showed rhythmic delta/theta activity with evolution in frequency which is suspicious for  clinical surface negative focal seizures vs movement disorder  2)	Moderate to severe generalized slowing indicating similar degree of diffuse cerebral dysfunction

## 2025-03-04 NOTE — PROGRESS NOTE ADULT - SUBJECTIVE AND OBJECTIVE BOX
HPI:  Unknown age male, unknown past medical history (reported stroke and MI by coworkers) presented to [facility] with AMS, Pt was working at [employer] when he was found down by coworkers. EMS called and pt brought to [facility] ED. Intubated, sedated, started on cardene for SBPs in 200s. CT head showed brain stem bleed. Transferred to NSICU for further management.  (30 Sep 2024 12:55)    HOSPITAL COURSE: (condensed hospital course)   1/1: GEORGE overnight, neuro stable.  1/2: GEORGE overnight, neuro stable. FW decreased to 100q6.   1/3: GEORGE overnight, neuro stable. fosphenytoin IV changed to pheytoin PO via PEG per epilepsy recommendations  1/4: GEORGE overnight, neuro stable. FW incr. to 100q4  1/5: GEORGE overnight, neuro stable.   1/6: GEORGE overnight, neuro stable   1/7: GEORGE overnight, neuro stable. BUN/Cr increased, increased FW to 200q6. Given 1L bolus of LR over 2 hours. Vuong d/c'd, voiding, continue bladder scan q6.   1/8: GEORGE overnight, neuro stable. BUN/Cr improving.  1/9: GEORGE overnight, neuro stable.   1/10: GEORGE overnight, neuro stable.   1/11: GEORGE overnight, neuro stable, vent settings stable.   1/12: GEORGE overnight, neuro stable. Febrile to 102F, f/u pan cx, chest xray, given tylenol. Zosyn started.   1/13: GEORGE overnight, neuro stable, cont Zosyn for presumed PNA, prelim sputum cx growing; few GS neg diplococci, mod GS neg rods, rare GS + rods, fever trend improved. Free water increased to [id number].   1/14: GEORGE overnight. pending renal US for elevated Cr  1/15: GEORGE ovn. renal US complete.   1/16: GEORGE overnight, neuro stable   1/17: GEORGE overnight, neuro stable   1/18: GEORGE overnight, neuro stable.   1/19: GEORGE overnight, neuro stable. Propranolol dec to 5q8.   1/20: GEORGE overnight, neuro stable. Weaned propranolol to 5mg BID.   1/21: GEORGE ovn, in AM having rapid vertical eye movements with facial twitching, 2g total keppra given for AM dose and phenytoin level ordered, vital signs stable. CTH stable. Phenytoin increased to 100/100/150mg per epilepsy  1/22: GEORGE ovn. IV hydral x 1 for SBP 170s.   1/23: Cont to have focal motor seizures. Per epilepsy, changed phenytoin dose to 100/100/200.   1/24: Phenytoin lvl tmrw AM. Chest Xray completed due to temp 100.2F  1/25: GEORGE overnight. Incr dilantin morning and afternoon per epilepsy.   1/26: GEORGE overnight. Sustained focal twitching noticed by nursing. Ativan 8mg in total given. Fosphenytoin 1500mg IV given. Placed on EEG. Epilepsy following. TFs held. Phenytoin increased to 150/150/200.   1/27: o/n CTH completed due to continued lethargy after ativan given yesterday afternoon. TFs resumed. 500cc bolus NS given for SBP 90s. EEG dc'ed, discussed w/ Dr. Tomlin. Febrile 101F, pan cx sent. Likely left sided infiltrate on CXR, c/f aspiration PNA. Started on vanc/zosyn. MRSA swab pending.   1/28: Neuro stable, on vanc/zosyn. +UTI on UA, MRSA negative. Vanc dc'd. RVP negative. Zosyn increased to pseudomonal dosing.   1/29: GEORGE overnight, neuro stable.  1/30: GEORGE overnight, neuro stable. Dc'd zosyn due to resistance, switched to Levaquin.  1/31: GEORGE ovenight, neuro stable.   2/1: Brief desat. Improved with neb and reposititioning. ABG and POCUS wnl.   2/4: GEORGE overnight  2/5: GEROGE overnight  2/6: GEROGE ovn  2/7: GEORGE ovn. L eye redness noted, started erythromycin and lacrilube to L eye as well. LR @ 100 cc/hr x 10 hours for uptrending BUN/Cr. Resumed bowel reg.   2/8: GEORGE overnight. Escalated bowel regimen.   2/9: GEORGE overnight.  2/10: GEORGE overnight. Social work to start working on SNF placement. Pending appointment with [employer] to come to hospital for biometrics.  2/11: GEORGE overnight. Neuro stable. Potassium improved (downtrending).   2/12: GEORGE overnight. Neuro stable. Lokelma 5mg x 1. Decrease FW to 200q8. 1L NS bolus given for hydration, uptrending BUN/Cr. Wound care rec barrier wipes for penile wound.  TFs changed to [email or web address] renal formula per nutrition.  2/13: GEORGE overnight. Neuro stable. Corneal abrasion noted on exam, ophtho re-consulted for L eye, eye drops changed per recs to q4. Pulm contacted for trach exchange, attempted at bedside but patient unable to tolerate with minimal sedation, will plan for tomorrow.   2/14: GEORGE overnight, given 1L of NS for low BP after fentanyl with improvement. Neuro exam stable, pend trach exchange today with pulm. Pend optho assessment for left eye. Trache exchanged. Desaturation during placement to 90%. FiO2 incr'd 55%. CXR negative for pneumothorax.   2/15: GEORGE overnight. Dest'd 88%: suctione, incr'd FiO2, PEEP. CXR/ABG/POCUS done with B-lines, 20mg IV lasix given.   2/16: GEORGE ovn, BAL from 2/13 during trach exchange growing moderate stenotrophomonas maltophilia, desat to 87% red rubber suctioning performed with improvements in O2 sat to 98%, Desturated again to 76%. Deep suctioned, ABG/CXR and lasix, incr. vent settings, Saturating 95%. Pulm rec'd dc mucomyst. Increased clonus while having another episode of desaturation, given 4mg ativan, clonus broke. O2 sats normalized with deep suction. Back on vEEG per gen neuro. CT chest and sputum cx obtained per ID.   2/17: GEORGE ovn. Blood cultures drawn. ID rec'd treatment for stenotrophomonas. EEG with concern for singular seizure activity. Keppra and Phenytoin levels were drawn. 2x blood cultures sent prior to starting DS Bactrim and Minocycline PO as per ID recs. Additional standing ativan added per epilepsy.   2/18: GEORGE ovn. Per epilepsy ativan decreased to 1mg q12. EEG dc'd.  2/19: GEORGE ovn. 500cc bolus for Cr bump over 5 hrs. Dc'd bactrim and minocycline. Start ertapanem as per ID.   2/20: GEORGE overnight, neuro stable. Cr bump likely 2/2 Bactrim, will monitor. TF adjusted off minocycline. DC propranolol and baclofen. Sent photo of L eye to ophtho, increased drops to q4.  2/21: GEORGE overnight, neuro stable. Keep Ertapenem per Dr. Velasco. FENa consistent with pre-renal ANIKA, 1L LR administered 125cc/hr.  2/22: GEORGE overnight, bowel regimen held due to loose stool.   2/23: o/n patient with desat to 80s, improved with suctioning and patient coughing up sputum.  2/24: GEORGE overnight.  2/25: GEORGE overnight. FW increased to 200cc q6.  2/26: GEORGE overnight. Weaned doxazosin to 4mg d/t low blood pressure.  2/27: GEORGE overnight. Desaturated to mid 80s, suctioned and incr'd FiO2, returned to mid 90s. FiO2 back to 50%.   2/28: GEORGE overnight, neuro stable. FiO2 dec to 40%, stable. Propranolol dc'd. Metoprolol for tachycardia, DC amlodipine.  3/1: GEORGE overnight. pt not reacting to noxious stimuli, CTH done, stable from prior, epilepsy consulted d/t concern for seizure. Placed on EEG, +seizure. Given stat vimpat and started vimpat 100mg BID. Phenytoin level within range.   3/2: GEORGE overnight. Restart bowel regimen. Patient with 2 additional clinical seizures on vEEG involving R head turning and R arm stiffening. Per epilepsy, 100mg IV vimpat given STAT and standing dose increased to 200mg IV BID.  3/3: GEORGE overnight. EEG still with focal discharges c/f seizure, added gabapentin 300 q8 as per epilepsy.   3/4: GEORGE o/n. neuro stable.     OVERNIGHT EVENTS:  Vital Signs Last 24 Hrs  T(C): 36.6 (04 Mar 2025 08:54), Max: 36.8 (03 Mar 2025 13:58)  T(F): 97.9 (04 Mar 2025 08:54), Max: 98.3 (03 Mar 2025 13:58)  HR: 93 (04 Mar 2025 06:11) (74 - 93)  BP: 106/69 (04 Mar 2025 05:14) (106/69 - 124/79)  BP(mean): 83 (04 Mar 2025 05:14) (83 - 99)  RR: 22 (04 Mar 2025 06:11) (14 - 22)  SpO2: 96% (04 Mar 2025 06:11) (96% - 100%)    Parameters below as of 04 Mar 2025 06:11  Patient On (Oxygen Delivery Method): ventilator    O2 Concentration (%): 40    I&O's Summary    03 Mar 2025 07:01  -  04 Mar 2025 07:00  --------------------------------------------------------  IN: 1803 mL / OUT: 1350 mL / NET: 453 mL        PHYSICAL EXAM:  General: NAD, pt is sitting up in bed, trach to AC/VC  HEENT: PERRL 3mm briskly reactive, eyes open to voice, (+) left eye injected  Cardiovascular: RRR, S1, S2  Respiratory: breathing non-labored on AC/VC, chest rise symmetric, +trach   GI: abd soft, NTND, +PEG   Neuro: A&Ox2, squeezes hand to communicate, non-verbal, intermittently follows commands   RUE HG 2/5 to command, RLE wiggles toes to command. LUE 0/5, LLE w/d to noxious  Extremities: distal pulses 2+ x4      TUBES/LINES:  [] Vuong  [] Lumbar Drain  [] Wound Drains  [] Others    DIET:  [] NPO  [] Mechanical  [X] Tube feeds    LABS:                        10.1   6.89  )-----------( 237      ( 03 Mar 2025 05:30 )             31.1     03-03    138  |  101  |  56[H]  ----------------------------<  138[H]  4.0   |  25  |  1.12    Ca    9.3      03 Mar 2025 05:30  Phos  3.5     03-03  Mg     2.5     03-03        Urinalysis Basic - ( 03 Mar 2025 05:30 )    Color: x / Appearance: x / SG: x / pH: x  Gluc: 138 mg/dL / Ketone: x  / Bili: x / Urobili: x   Blood: x / Protein: x / Nitrite: x   Leuk Esterase: x / RBC: x / WBC x   Sq Epi: x / Non Sq Epi: x / Bacteria: x          CAPILLARY BLOOD GLUCOSE          Drug Levels: [] N/A  Phenytoin Level, Serum: 17.3 ug/mL (03-01 @ 19:25)    CSF Analysis: [] N/A      Allergies    Allergy Status Unknown    Intolerances      MEDICATIONS:  Antibiotics:    Neuro:  acetaminophen   Oral Liquid .. 650 milliGRAM(s) Oral every 6 hours PRN  gabapentin 300 milliGRAM(s) Oral every 8 hours  lacosamide IVPB 200 milliGRAM(s) IV Intermittent every 12 hours  levETIRAcetam 1500 milliGRAM(s) Oral every 12 hours  LORazepam     Tablet 1 milliGRAM(s) Oral every 12 hours  LORazepam   Injectable 2 milliGRAM(s) IV Push once PRN  phenytoin   Suspension 150 milliGRAM(s) Oral <User Schedule>  phenytoin   Suspension 200 milliGRAM(s) Oral <User Schedule>    Anticoagulation:  heparin   Injectable 5000 Unit(s) SubCutaneous every 8 hours    OTHER:  albuterol/ipratropium for Nebulization 3 milliLiter(s) Nebulizer every 4 hours  artificial tears (preservative free) Ophthalmic Solution 1 Drop(s) Both EYES every 4 hours  chlorhexidine 0.12% Liquid 15 milliLiter(s) Oral Mucosa every 12 hours  doxazosin 4 milliGRAM(s) Oral at bedtime  erythromycin   Ointment 1 Application(s) Both EYES every 4 hours  fluticasone propionate 50 MICROgram(s)/spray Nasal Spray 1 Spray(s) Both Nostrils two times a day  influenza   Vaccine 0.5 milliLiter(s) IntraMuscular once  metoprolol tartrate 12.5 milliGRAM(s) Oral every 12 hours  pantoprazole   Suspension 40 milliGRAM(s) Oral daily  petrolatum Ophthalmic Ointment 1 Application(s) Both EYES every 4 hours  polyethylene glycol 3350 17 Gram(s) Oral daily  senna 2 Tablet(s) Oral at bedtime  sodium chloride 0.65% Nasal 1 Spray(s) Both Nostrils two times a day    IVF:    CULTURES:    RADIOLOGY & ADDITIONAL TESTS:      ASSESSMENT:  44M PMH ?stroke/MI present to [facility] after collapsing at work. Decorticate posturing, vomiting, intubated for airway protection. 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 Transferred to [facility] for further management. s/p trach 10/14. s/p peg 10/21. Re-upgrade to ICU 2/2 desaturation event and suctioning requirements 11/15. Re-upgrade to NSICU 12/15 2/2 desaturation and tachycardia.      PLAN:    Neuro:  - neuro/vitals q4h  - pain control: tylenol prn  - seizure tx: keppra 1500mg BID, phenytoin 150/150/200, ativan 1mg q12, vimpat IV  200mg BID, gabapentin 300 mg q8  - vEEG (3/1- ), s/p vEEG: +seizure on 2/17, 3/1  - CTH 9/30: enlarged pontine hemorrhage, CTH 10/3: stable, CTH 10/25: mostly resolved pontine hemorrhage, CTH 12/15: R mastoid air cell opacification; acute otitis media vs sterile effusion, CTH 12/18: stable. CTH 1/21 stable, CTH 1/27 stable, CTH 3/1: stable   - MRI brain 10/12: parenchymal hemorrhage, acute/subacute R cerebellar stroke      CV:  - -160  - HTN/tachycardia: Metoprolol 12.5 mg BID   - echo (9/30) EF 75%, repeat 12/16: 57%  - MD 3/3: 172    PULM:  - s/p trach exchange with pulm at bedside 2/14 to vent, AC/VC 40/400/14/6  - Secretions: duonebs/chest PT q4h  - CT chest 2/17: Near complete collapse b/l lower lobes. Patchy opacity within LLL/ALONDRA  - pulmonology consulted, recs appreciated    GI:  - TFs via PEG, candelaria roseanna renal  - bowel regimen, last BM 3/1    Renal:  - IVL  - FW 200q6 for hydration  - Voiding, SC prn  - CKD: trend BUN/Cr  - renal US 10/1, 10/8, 1/15: Increased bilateral renal echogenicity consistent with medical renal disease  - urinary retention: doxazosin 4mg at bedtime     Endo:  - A1c 5.4    Heme:  - DVT ppx: SCDs, SQH 5000u q8h   - LE dopplers negative 12/16    ID:  - Afebrile, last pancx 2/17  - sputum 2/16 +Enterobacter cloacae: s/p Ertapenem (2/19-23) per ID recs, conolonized with stenotrophomonas  - empiric PNA: cefepime (12/22-12/30), s/p vanc (12/22-12/23), s/p zosyn (12/15-12/20), s/p vanc (12/15-12/16), s/p zosyn (1/12 -1/18), s/p DS bactrim 2 tabs TID, 200 mg minocycline BID (2/18)  - Hx +klebsiella UTI & bacteremia, +stenotrophomopnas maltophilia PNA, +Enterobacter cloacae PNA     MISC:  - BL keratitis: BL erythromycin ointment, artificial tears, lacrilube q4, moisture chamber   - Penile wound: wound care rec barrier wipes     Dispo: SDU status, DNR, pending SNF    D/w Dr. D'Amico

## 2025-03-04 NOTE — EEG REPORT - NS EEG TEXT BOX
[facility] Department of Neurology  Inpatient Epilepsy Monitoring Unit video-Electroencephalography Report    Acquisition Details:  Electroencephalography was acquired using a minimum of 21 channels on an [employer] system v 9.3.1 with electrode placement according to the standard International 10-20 system following ACNS (American Clinical Neurophysiology Society) guidelines for Long-Term Video EEG monitoring.  Anterior temporal T1 and T2 electrodes were utilized whenever possible.   The [employer] automated spike & seizure detections were all reviewed in detail, in addition to extensive portions of raw EEG.  Specially-trained nurses were available for seizure-related events.  Continuous live-time video monitoring of the patients for seizure-related and safety events was performed by specially-trained technicians.        Day 3:  3/3/2025, 7:00 AM to next morning at 07:00 AM   Meds:keppra 1500 mg bid, phenytoin 150/150/200, vimpat 200 mg bid, Ativan 1 mg bid, gabapentin 300 mg Q8hr  Background:  continuous, with predominantly theta/delta  Generalized Slowing:  Intermittent frequent sporadic polymorphic delta  Symmetry/Focality: No persistent asymmetries of voltage or frequency.   However significant artifact over right frontotemporal area  limited interpretation  Voltage:  Low (most <20uV LBP Peak-Trough)  Organization:  Absent  Posterior Dominant Rhythm:  No clear PDR   Sleep:  Rudimentary but likely N2 transients present.  Variability:   Yes		Reactivity:  Unclear    Spontaneous Activity:  No epileptiform discharges     Events: Frequent muscle artifact over right fronto-temporal associated with right face/lip twitching without ictal changes on EEG  which could be focal motor seizure vs movement abnormality  At 6:14 Am he had episode of torso shaking with no ictal change in EEG    Provocations:  •	Hyperventilation: was not performed.  •	Photic stimulation: was not performed.  Daily Summary:    1)	Multiple events( right face twitching, left face/lip irregular twitching, whole body shaking) were captured with EMG artifact over right fronto-temporal  without ictal changes on EEG suspicious for  surface negative  focal motor  seizures vs movement abnormality  2)	Moderate to severe generalized slowing indicating similar degree of diffuse cerebral dysfunction    Bessie Martinez MD  CNP Fellow     [facility] Department of Neurology  Inpatient Epilepsy Monitoring Unit video-Electroencephalography Report    Acquisition Details:  Electroencephalography was acquired using a minimum of 21 channels on an [employer] system v 9.3.1 with electrode placement according to the standard International 10-20 system following ACNS (American Clinical Neurophysiology Society) guidelines for Long-Term Video EEG monitoring.  Anterior temporal T1 and T2 electrodes were utilized whenever possible.   The [employer] automated spike & seizure detections were all reviewed in detail, in addition to extensive portions of raw EEG.  Specially-trained nurses were available for seizure-related events.  Continuous live-time video monitoring of the patients for seizure-related and safety events was performed by specially-trained technicians.        Day 3:  3/3/2025, 7:00 AM to next morning at 07:00 AM   Meds:keppra 1500 mg bid, phenytoin 150/150/200, vimpat 200 mg bid, Ativan 1 mg bid, gabapentin 300 mg Q8hr  Background:  continuous, with predominantly theta/delta  Generalized Slowing:  Intermittent frequent sporadic polymorphic delta  Symmetry/Focality: No persistent asymmetries of voltage or frequency.   However significant artifact over right frontotemporal area  limited interpretation  Voltage:  Low (most <20uV LBP Peak-Trough)  Organization:  Absent  Posterior Dominant Rhythm:  No clear PDR   Sleep:  Rudimentary but likely N2 transients present.  Variability:   Yes		Reactivity:  Unclear    Spontaneous Activity:  No epileptiform discharges     Events: Frequent muscle artifact over right fronto-temporal associated with right face/lip twitching without ictal changes on EEG  which could be focal motor seizure vs movement abnormality  At 6:14 Am he had episode of torso shaking with no ictal change in EEG    Provocations:  •	Hyperventilation: was not performed.  •	Photic stimulation: was not performed.  Daily Summary:    1)	Multiple events( right face twitching,) were captured with EMG artifact over right fronto-temporal  without ictal changes on EEG suspicious for  surface negative  focal motor  seizures vs movement abnormality  2)	Moderate to severe generalized slowing indicating similar degree of diffuse cerebral dysfunction    Bessie Martinez MD  CNP Fellow

## 2025-03-05 LAB
ADD ON TEST-SPECIMEN IN LAB: SIGNIFICANT CHANGE UP
ANION GAP SERPL CALC-SCNC: 14 MMOL/L — SIGNIFICANT CHANGE UP (ref 5–17)
BUN SERPL-MCNC: 54 MG/DL — HIGH (ref 7–23)
CALCIUM SERPL-MCNC: 9.1 MG/DL — SIGNIFICANT CHANGE UP (ref 8.4–10.5)
CHLORIDE SERPL-SCNC: 100 MMOL/L — SIGNIFICANT CHANGE UP (ref 96–108)
CO2 SERPL-SCNC: 24 MMOL/L — SIGNIFICANT CHANGE UP (ref 22–31)
CREAT SERPL-MCNC: 1.15 MG/DL — SIGNIFICANT CHANGE UP (ref 0.5–1.3)
EGFR: 79 ML/MIN/1.73M2 — SIGNIFICANT CHANGE UP
EGFR: 79 ML/MIN/1.73M2 — SIGNIFICANT CHANGE UP
GLUCOSE SERPL-MCNC: 115 MG/DL — HIGH (ref 70–99)
HCT VFR BLD CALC: 30.2 % — LOW (ref 39–50)
HGB BLD-MCNC: 10.1 G/DL — LOW (ref 13–17)
MAGNESIUM SERPL-MCNC: 2.1 MG/DL — SIGNIFICANT CHANGE UP (ref 1.6–2.6)
MCHC RBC-ENTMCNC: 32.4 PG — SIGNIFICANT CHANGE UP (ref 27–34)
MCHC RBC-ENTMCNC: 33.4 G/DL — SIGNIFICANT CHANGE UP (ref 32–36)
MCV RBC AUTO: 96.8 FL — SIGNIFICANT CHANGE UP (ref 80–100)
NRBC BLD AUTO-RTO: 0 /100 WBCS — SIGNIFICANT CHANGE UP (ref 0–0)
PHOSPHATE SERPL-MCNC: 4.3 MG/DL — SIGNIFICANT CHANGE UP (ref 2.5–4.5)
PLATELET # BLD AUTO: 218 K/UL — SIGNIFICANT CHANGE UP (ref 150–400)
POTASSIUM SERPL-MCNC: 4.2 MMOL/L — SIGNIFICANT CHANGE UP (ref 3.5–5.3)
POTASSIUM SERPL-SCNC: 4.2 MMOL/L — SIGNIFICANT CHANGE UP (ref 3.5–5.3)
RBC # BLD: 3.12 M/UL — LOW (ref 4.2–5.8)
RBC # FLD: 12.5 % — SIGNIFICANT CHANGE UP (ref 10.3–14.5)
SODIUM SERPL-SCNC: 138 MMOL/L — SIGNIFICANT CHANGE UP (ref 135–145)
WBC # BLD: 7.96 K/UL — SIGNIFICANT CHANGE UP (ref 3.8–10.5)
WBC # FLD AUTO: 7.96 K/UL — SIGNIFICANT CHANGE UP (ref 3.8–10.5)

## 2025-03-05 PROCEDURE — 99232 SBSQ HOSP IP/OBS MODERATE 35: CPT

## 2025-03-05 PROCEDURE — 99233 SBSQ HOSP IP/OBS HIGH 50: CPT

## 2025-03-05 PROCEDURE — 95720 EEG PHY/QHP EA INCR W/VEEG: CPT

## 2025-03-05 RX ORDER — LORAZEPAM 4 MG/ML
0.5 VIAL (ML) INJECTION
Refills: 0 | Status: DISCONTINUED | OUTPATIENT
Start: 2025-03-05 | End: 2025-03-05

## 2025-03-05 RX ORDER — LORAZEPAM 4 MG/ML
0.5 VIAL (ML) INJECTION
Refills: 0 | Status: DISCONTINUED | OUTPATIENT
Start: 2025-03-05 | End: 2025-03-09

## 2025-03-05 RX ADMIN — Medication 15 MILLILITER(S): at 05:26

## 2025-03-05 RX ADMIN — GABAPENTIN 300 MILLIGRAM(S): 400 CAPSULE ORAL at 21:03

## 2025-03-05 RX ADMIN — Medication 1 SPRAY(S): at 05:30

## 2025-03-05 RX ADMIN — IPRATROPIUM BROMIDE AND ALBUTEROL SULFATE 3 MILLILITER(S): .5; 2.5 SOLUTION RESPIRATORY (INHALATION) at 10:29

## 2025-03-05 RX ADMIN — LACOSAMIDE 140 MILLIGRAM(S): 150 TABLET, FILM COATED ORAL at 12:48

## 2025-03-05 RX ADMIN — IPRATROPIUM BROMIDE AND ALBUTEROL SULFATE 3 MILLILITER(S): .5; 2.5 SOLUTION RESPIRATORY (INHALATION) at 21:03

## 2025-03-05 RX ADMIN — Medication 1 APPLICATION(S): at 18:14

## 2025-03-05 RX ADMIN — IPRATROPIUM BROMIDE AND ALBUTEROL SULFATE 3 MILLILITER(S): .5; 2.5 SOLUTION RESPIRATORY (INHALATION) at 12:58

## 2025-03-05 RX ADMIN — ERYTHROMYCIN 1 APPLICATION(S): 5 OINTMENT OPHTHALMIC at 18:13

## 2025-03-05 RX ADMIN — ERYTHROMYCIN 1 APPLICATION(S): 5 OINTMENT OPHTHALMIC at 21:03

## 2025-03-05 RX ADMIN — FLUTICASONE PROPIONATE 1 SPRAY(S): 50 SPRAY, METERED NASAL at 05:30

## 2025-03-05 RX ADMIN — Medication 2 TABLET(S): at 21:04

## 2025-03-05 RX ADMIN — METOPROLOL SUCCINATE 12.5 MILLIGRAM(S): 50 TABLET, EXTENDED RELEASE ORAL at 05:30

## 2025-03-05 RX ADMIN — HEPARIN SODIUM 5000 UNIT(S): 1000 INJECTION INTRAVENOUS; SUBCUTANEOUS at 05:29

## 2025-03-05 RX ADMIN — HEPARIN SODIUM 5000 UNIT(S): 1000 INJECTION INTRAVENOUS; SUBCUTANEOUS at 13:01

## 2025-03-05 RX ADMIN — Medication 1 APPLICATION(S): at 01:00

## 2025-03-05 RX ADMIN — ERYTHROMYCIN 1 APPLICATION(S): 5 OINTMENT OPHTHALMIC at 05:30

## 2025-03-05 RX ADMIN — Medication 1 DROP(S): at 13:02

## 2025-03-05 RX ADMIN — Medication 0.5 MILLIGRAM(S): at 21:03

## 2025-03-05 RX ADMIN — Medication 1 APPLICATION(S): at 10:30

## 2025-03-05 RX ADMIN — ERYTHROMYCIN 1 APPLICATION(S): 5 OINTMENT OPHTHALMIC at 13:01

## 2025-03-05 RX ADMIN — IPRATROPIUM BROMIDE AND ALBUTEROL SULFATE 3 MILLILITER(S): .5; 2.5 SOLUTION RESPIRATORY (INHALATION) at 18:13

## 2025-03-05 RX ADMIN — Medication 1 APPLICATION(S): at 21:05

## 2025-03-05 RX ADMIN — Medication 200 MILLIGRAM(S): at 21:05

## 2025-03-05 RX ADMIN — FLUTICASONE PROPIONATE 1 SPRAY(S): 50 SPRAY, METERED NASAL at 18:14

## 2025-03-05 RX ADMIN — Medication 650 MILLIGRAM(S): at 09:30

## 2025-03-05 RX ADMIN — Medication 1 APPLICATION(S): at 05:30

## 2025-03-05 RX ADMIN — Medication 150 MILLIGRAM(S): at 03:09

## 2025-03-05 RX ADMIN — HEPARIN SODIUM 5000 UNIT(S): 1000 INJECTION INTRAVENOUS; SUBCUTANEOUS at 21:03

## 2025-03-05 RX ADMIN — GABAPENTIN 300 MILLIGRAM(S): 400 CAPSULE ORAL at 13:01

## 2025-03-05 RX ADMIN — Medication 15 MILLILITER(S): at 18:12

## 2025-03-05 RX ADMIN — POLYETHYLENE GLYCOL 3350 17 GRAM(S): 17 POWDER, FOR SOLUTION ORAL at 12:58

## 2025-03-05 RX ADMIN — IPRATROPIUM BROMIDE AND ALBUTEROL SULFATE 3 MILLILITER(S): .5; 2.5 SOLUTION RESPIRATORY (INHALATION) at 05:26

## 2025-03-05 RX ADMIN — DOXAZOSIN MESYLATE 4 MILLIGRAM(S): 8 TABLET ORAL at 21:04

## 2025-03-05 RX ADMIN — LEVETIRACETAM 1500 MILLIGRAM(S): 10 INJECTION, SOLUTION INTRAVENOUS at 05:26

## 2025-03-05 RX ADMIN — Medication 1 DROP(S): at 21:04

## 2025-03-05 RX ADMIN — Medication 1 DROP(S): at 10:30

## 2025-03-05 RX ADMIN — Medication 1 APPLICATION(S): at 13:01

## 2025-03-05 RX ADMIN — LACOSAMIDE 140 MILLIGRAM(S): 150 TABLET, FILM COATED ORAL at 22:28

## 2025-03-05 RX ADMIN — Medication 150 MILLIGRAM(S): at 12:58

## 2025-03-05 RX ADMIN — Medication 1 APPLICATION(S): at 07:08

## 2025-03-05 RX ADMIN — Medication 1 DROP(S): at 05:29

## 2025-03-05 RX ADMIN — Medication 1 DROP(S): at 01:07

## 2025-03-05 RX ADMIN — METOPROLOL SUCCINATE 12.5 MILLIGRAM(S): 50 TABLET, EXTENDED RELEASE ORAL at 18:12

## 2025-03-05 RX ADMIN — Medication 650 MILLIGRAM(S): at 08:42

## 2025-03-05 RX ADMIN — Medication 1 SPRAY(S): at 18:14

## 2025-03-05 RX ADMIN — Medication 1 DROP(S): at 18:14

## 2025-03-05 RX ADMIN — LEVETIRACETAM 1500 MILLIGRAM(S): 10 INJECTION, SOLUTION INTRAVENOUS at 18:13

## 2025-03-05 RX ADMIN — IPRATROPIUM BROMIDE AND ALBUTEROL SULFATE 3 MILLILITER(S): .5; 2.5 SOLUTION RESPIRATORY (INHALATION) at 01:00

## 2025-03-05 RX ADMIN — GABAPENTIN 300 MILLIGRAM(S): 400 CAPSULE ORAL at 05:26

## 2025-03-05 RX ADMIN — ERYTHROMYCIN 1 APPLICATION(S): 5 OINTMENT OPHTHALMIC at 01:00

## 2025-03-05 RX ADMIN — Medication 40 MILLIGRAM(S): at 12:58

## 2025-03-05 RX ADMIN — ERYTHROMYCIN 1 APPLICATION(S): 5 OINTMENT OPHTHALMIC at 10:30

## 2025-03-05 NOTE — PROGRESS NOTE ADULT - SUBJECTIVE AND OBJECTIVE BOX
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 Transferred to [facility] for further management. s/p trach 10/14. s/p peg 10/21. Re-upgrade to ICU 2/2 desaturation event and suctioning requirements 11/15. Re-upgrade to NSICU 12/15 2/2 desaturation and tachycardia.    Neuro:  - neuro/vitals q4h  - pain control: tylenol prn  - seizure tx: keppra 1500mg BID, phenytoin 150/150/200, ativan 1mg q12, vimpat IV  200mg BID, gabapentin 300 mg q8  - vEEG (3/1- ), s/p vEEG: +seizure on 2/17, 3/1  - CTH 9/30: enlarged pontine hemorrhage, CTH 10/3: stable, CTH 10/25: mostly resolved pontine hemorrhage, CTH 12/15: R mastoid air cell opacification; acute otitis media vs sterile effusion, CTH 12/18: stable. CTH 1/21 stable, CTH 1/27 stable, CTH 3/1: stable   - MRI brain 10/12: parenchymal hemorrhage, acute/subacute R cerebellar stroke      CV:  - -160  - HTN/tachycardia: Metoprolol 12.5 mg BID   - echo (9/30) EF 75%, repeat 12/16: 57%  - ND 3/3: 172    PULM:  - s/p trach exchange with pulm at bedside 2/14 to vent, AC/VC 40/400/14/6  - Secretions: duonebs/chest PT q4h  - CT chest 2/17: Near complete collapse b/l lower lobes. Patchy opacity within LLL/ALONDRA  - pulmonology consulted, recs appreciated    GI:  - TFs via PEG, [facility]  - bowel regimen, last BM 3/1    Renal:  - IVL; FW 200q6 for hydration  - Voiding, SC prn  - CKD: trend BUN/Cr  - renal US 10/1, 10/8, 1/15: Increased bilateral renal echogenicity consistent with medical renal disease  - urinary retention: doxazosin 4mg at bedtime     Endo:  - A1c 5.4    Heme:  - DVT ppx: SCDs, SQH 5000u q8h   - LE dopplers negative 12/16    ID:  - Afebrile, last pancx 2/17  - sputum 2/16 +Enterobacter cloacae: s/p Ertapenem (2/19-23) per ID recs, conolonized with stenotrophomonas  - empiric PNA: cefepime (12/22-12/30), s/p vanc (12/22-12/23), s/p zosyn (12/15-12/20), s/p vanc (12/15-12/16), s/p zosyn (1/12 -1/18), s/p DS bactrim 2 tabs TID, 200 mg minocycline BID (2/18)  - Hx +klebsiella UTI & bacteremia, +stenotrophomopnas maltophilia PNA, +Enterobacter cloacae PNA     MISC:  - BL keratitis: BL erythromycin ointment, artificial tears, lacrilube q4, moisture chamber   - Penile wound: wound care rec barrier wipes     Dispo: SDU status, DNR, pending SNF    D/w Dr. D'Amico

## 2025-03-05 NOTE — EEG REPORT - NS EEG TEXT BOX
[facility] Department of Neurology  Inpatient Epilepsy Monitoring Unit video-Electroencephalography Report    Acquisition Details:  Electroencephalography was acquired using a minimum of 21 channels on an [employer] system v 9.3.1 with electrode placement according to the standard International 10-20 system following ACNS (American Clinical Neurophysiology Society) guidelines for Long-Term Video EEG monitoring.  Anterior temporal T1 and T2 electrodes were utilized whenever possible.   The [employer] automated spike & seizure detections were all reviewed in detail, in addition to extensive portions of raw EEG.  Specially-trained nurses were available for seizure-related events.  Continuous live-time video monitoring of the patients for seizure-related and safety events was performed by specially-trained technicians.      Day 3:  3/4/2025, 7:00 AM to next morning at 07:00 AM   Meds:keppra 1500 mg bid, phenytoin 150/150/200, vimpat 200 mg bid, , gabapentin 300 mg Q8hr  Background:  continuous, with predominantly theta/delta  Generalized Slowing:  Intermittent frequent sporadic polymorphic delta  Symmetry/Focality: No persistent asymmetries of voltage or frequency.   However  artifact over right frontotemporal area  limited interpretation  Voltage:  Low (most <20uV LBP Peak-Trough)  Organization:  Absent  Posterior Dominant Rhythm:  No clear PDR   Sleep:  Rudimentary but likely N2 transients present.  Variability:   Yes		Reactivity:  Unclear    Spontaneous Activity:  No epileptiform discharges     Events:Frequent muscle artifact over right fronto-temporal associated with right face/lip twitching without ictal changes on EEG  which could be focal motor seizure vs movement abnormality    Provocations:  •	Hyperventilation: was not performed.  •	Photic stimulation: was not performed.  Daily Summary:    1)	Multiple events( right face twitching,) were captured with EMG artifact over right fronto-temporal  without ictal changes on EEG which could represent  focal motor  seizures vs movement abnormality  2)	Moderate to severe generalized slowing indicating similar degree of diffuse cerebral dysfunction    Bessie Martinez MD  CNP Fellow

## 2025-03-05 NOTE — PROGRESS NOTE ADULT - ASSESSMENT
44 year-old-male with unclear PMH (?stroke, MI) who was found down at work, intubated for airway protection and found to have acute large parenchymal hemorrhage within nabil with mass effect (+ acute/subacute right cerebellar infarct) in setting of hypertensive emergency, and R cerebellar stroke transferred to [facility] for further neurosurgical care. Epilepsy called back on 3/1 for concern of seizure contributing to worsening mental status as repeat CTH was negative. Previous vEEGs were notable for right quadrant anterior seizures and epileptiform potential. Patient has long standing intermittent facial twitching at times not correlated to seizures as per chart review.  Found to have multiple seizures on recent EEGs requiring titration of meds.   Today on exam, he is lethargic but can follows some simple commands however has persistent right rhythmic lower facial twitching. vEEG overnight showed     Recommendations:     44 year-old-male with unclear PMH (?stroke, MI) who was found down at work, intubated for airway protection and found to have acute large parenchymal hemorrhage within nabil with mass effect (+ acute/subacute right cerebellar infarct) in setting of hypertensive emergency, and R cerebellar stroke transferred to [facility] for further neurosurgical care. Epilepsy called back on 3/1 for concern of seizure contributing to worsening mental status as repeat CTH was negative. Previous vEEGs were notable for right quadrant anterior seizures and epileptiform potential. Patient has long standing intermittent facial twitching at times not correlated to seizures as per chart review.  Found to have multiple seizures on recent EEGs requiring titration of meds.   Today on exam, he is lethargic but can follow some simple commands however has persistent right rhythmic lower facial twitching. Mental status appears otherwise improved from yesterday.     Recommendations:  - c/w VEEG for now  - Monitor off ativan  - Continue gabapentin 300 mg q8h  - Continue Vimpat 200mg BID  - Continue Keppra 1500mg Q12hrs   - Continue Phenytoin to 150/150/200mg   - repeat MRI brain w/wo c+ it primary team in agreement.  - f/u keppra levels (drawn on 3/2)  - Maintain seizure/fall/aspiration precautions  - Rest of the management per primary team     Case discussed with Dr. Farley   44 year-old-male with unclear PMH (?stroke, MI) who was found down at work, intubated for airway protection and found to have acute large parenchymal hemorrhage within nabil with mass effect (+ acute/subacute right cerebellar infarct) in setting of hypertensive emergency, and R cerebellar stroke transferred to [facility] for further neurosurgical care. Epilepsy called back on 3/1 for concern of seizure contributing to worsening mental status as repeat CTH was negative. Previous vEEGs were notable for right quadrant anterior seizures and epileptiform potential. Patient has long standing intermittent facial twitching at times not correlated to seizures as per chart review.  Found to have multiple seizures on recent EEGs requiring titration of meds.   Today on exam, he is lethargic but can follow some simple commands however has persistent right rhythmic lower facial twitching. Mental status and twitching appears otherwise improved from yesterday.     Recommendations:  - c/w VEEG for now  - Restart ativan at 0.5mg qHS for 5 days to avoid withdrawal  - Continue gabapentin 300 mg q8h  - Continue Vimpat 200mg BID  - Continue Keppra 1500mg Q12hrs   - Continue Phenytoin to 150/150/200mg   - repeat MRI brain w/wo c+ it primary team in agreement.  - f/u keppra levels (drawn on 3/2)  - Maintain seizure/fall/aspiration precautions  - Rest of the management per primary team     Case discussed with Dr. Farley

## 2025-03-05 NOTE — PROGRESS NOTE ADULT - SUBJECTIVE AND OBJECTIVE BOX
Neurology Progress Note    Interval History:          PAST MEDICAL & SURGICAL HISTORY:  Acute myocardial infarction    CVA (cerebral vascular accident)            Medications:  acetaminophen   Oral Liquid .. 650 milliGRAM(s) Oral every 6 hours PRN  albuterol/ipratropium for Nebulization 3 milliLiter(s) Nebulizer every 4 hours  artificial tears (preservative free) Ophthalmic Solution 1 Drop(s) Both EYES every 4 hours  chlorhexidine 0.12% Liquid 15 milliLiter(s) Oral Mucosa every 12 hours  chlorhexidine 2% Cloths 1 Application(s) Topical <User Schedule>  doxazosin 4 milliGRAM(s) Oral at bedtime  erythromycin   Ointment 1 Application(s) Both EYES every 4 hours  fluticasone propionate 50 MICROgram(s)/spray Nasal Spray 1 Spray(s) Both Nostrils two times a day  gabapentin 300 milliGRAM(s) Oral every 8 hours  heparin   Injectable 5000 Unit(s) SubCutaneous every 8 hours  influenza   Vaccine 0.5 milliLiter(s) IntraMuscular once  lacosamide IVPB 200 milliGRAM(s) IV Intermittent every 12 hours  levETIRAcetam 1500 milliGRAM(s) Oral every 12 hours  LORazepam   Injectable 2 milliGRAM(s) IV Push once PRN  metoprolol tartrate 12.5 milliGRAM(s) Oral every 12 hours  pantoprazole   Suspension 40 milliGRAM(s) Oral daily  petrolatum Ophthalmic Ointment 1 Application(s) Both EYES every 4 hours  phenytoin   Suspension 150 milliGRAM(s) Oral <User Schedule>  phenytoin   Suspension 200 milliGRAM(s) Oral <User Schedule>  polyethylene glycol 3350 17 Gram(s) Oral daily  senna 2 Tablet(s) Oral at bedtime  sodium chloride 0.65% Nasal 1 Spray(s) Both Nostrils two times a day      Vital Signs Last 24 Hrs  T(C): 36.7 (05 Mar 2025 09:02), Max: 36.9 (04 Mar 2025 22:00)  T(F): 98 (05 Mar 2025 09:02), Max: 98.4 (04 Mar 2025 22:00)  HR: 98 (05 Mar 2025 06:11) (82 - 98)  BP: 109/73 (05 Mar 2025 05:16) (105/69 - 134/93)  BP(mean): 86 (05 Mar 2025 05:16) (82 - 109)  RR: 14 (05 Mar 2025 06:11) (14 - 15)  SpO2: 95% (05 Mar 2025 06:11) (94% - 98%)    Parameters below as of 05 Mar 2025 06:11  Patient On (Oxygen Delivery Method): ventilator    O2 Concentration (%): 40    Neurological Exam:   Mental status: Awake, alert and oriented x3.  Recent and remote memory intact.  Naming, repetition and comprehension intact.  Attention/concentration intact.  No dysarthria, no aphasia.  Fund of knowledge appropriate.    Cranial nerves: Pupils equally round and reactive to light, visual fields full, no nystagmus, extraocular muscles intact, V1 through V3 intact bilaterally and symmetric, face symmetric, hearing intact to finger rub, palate elevation symmetric, tongue was midline.  Motor:  MRC grading 5/5 b/l UE/LE.   strength 5/5.  Normal tone and bulk.  No abnormal movements.    Sensation: Intact to light touch, proprioception, and pinprick.   Coordination: No dysmetria on finger-to-nose and heel-to-shin.  No dysdiadokinesia.  Reflexes: 2+ in bilateral UE/LE, downgoing toes bilaterally. (-) Fabian.  Gait: Narrow and steady. No ataxia.  Romberg negative    Labs:  CBC Full  -  ( 05 Mar 2025 07:38 )  WBC Count : 7.96 K/uL  RBC Count : 3.12 M/uL  Hemoglobin : 10.1 g/dL  Hematocrit : 30.2 %  Platelet Count - Automated : 218 K/uL  Mean Cell Volume : 96.8 fl  Mean Cell Hemoglobin : 32.4 pg  Mean Cell Hemoglobin Concentration : 33.4 g/dL  Auto Neutrophil # : x  Auto Lymphocyte # : x  Auto Monocyte # : x  Auto Eosinophil # : x  Auto Basophil # : x  Auto Neutrophil % : x  Auto Lymphocyte % : x  Auto Monocyte % : x  Auto Eosinophil % : x  Auto Basophil % : x    03-05    138  |  100  |  54[H]  ----------------------------<  115[H]  4.2   |  24  |  1.15    Ca    9.1      05 Mar 2025 07:38  Phos  4.3     03-05  Mg     2.1     03-05          Urinalysis Basic - ( 05 Mar 2025 07:38 )    Color: x / Appearance: x / SG: x / pH: x  Gluc: 115 mg/dL / Ketone: x  / Bili: x / Urobili: x   Blood: x / Protein: x / Nitrite: x   Leuk Esterase: x / RBC: x / WBC x   Sq Epi: x / Non Sq Epi: x / Bacteria: x       Neurology Progress Note    Interval History:  Overnight, no acute events. Unable to obtain ROS given patient's mental status.       PAST MEDICAL & SURGICAL HISTORY:  Acute myocardial infarction    CVA (cerebral vascular accident)      Medications:  acetaminophen   Oral Liquid .. 650 milliGRAM(s) Oral every 6 hours PRN  albuterol/ipratropium for Nebulization 3 milliLiter(s) Nebulizer every 4 hours  artificial tears (preservative free) Ophthalmic Solution 1 Drop(s) Both EYES every 4 hours  chlorhexidine 0.12% Liquid 15 milliLiter(s) Oral Mucosa every 12 hours  chlorhexidine 2% Cloths 1 Application(s) Topical <User Schedule>  doxazosin 4 milliGRAM(s) Oral at bedtime  erythromycin   Ointment 1 Application(s) Both EYES every 4 hours  fluticasone propionate 50 MICROgram(s)/spray Nasal Spray 1 Spray(s) Both Nostrils two times a day  gabapentin 300 milliGRAM(s) Oral every 8 hours  heparin   Injectable 5000 Unit(s) SubCutaneous every 8 hours  influenza   Vaccine 0.5 milliLiter(s) IntraMuscular once  lacosamide IVPB 200 milliGRAM(s) IV Intermittent every 12 hours  levETIRAcetam 1500 milliGRAM(s) Oral every 12 hours  LORazepam   Injectable 2 milliGRAM(s) IV Push once PRN  metoprolol tartrate 12.5 milliGRAM(s) Oral every 12 hours  pantoprazole   Suspension 40 milliGRAM(s) Oral daily  petrolatum Ophthalmic Ointment 1 Application(s) Both EYES every 4 hours  phenytoin   Suspension 150 milliGRAM(s) Oral <User Schedule>  phenytoin   Suspension 200 milliGRAM(s) Oral <User Schedule>  polyethylene glycol 3350 17 Gram(s) Oral daily  senna 2 Tablet(s) Oral at bedtime  sodium chloride 0.65% Nasal 1 Spray(s) Both Nostrils two times a day      Vital Signs Last 24 Hrs  T(C): 36.7 (05 Mar 2025 09:02), Max: 36.9 (04 Mar 2025 22:00)  T(F): 98 (05 Mar 2025 09:02), Max: 98.4 (04 Mar 2025 22:00)  HR: 98 (05 Mar 2025 06:11) (82 - 98)  BP: 109/73 (05 Mar 2025 05:16) (105/69 - 134/93)  BP(mean): 86 (05 Mar 2025 05:16) (82 - 109)  RR: 14 (05 Mar 2025 06:11) (14 - 15)  SpO2: 95% (05 Mar 2025 06:11) (94% - 98%)    Parameters below as of 05 Mar 2025 06:11  Patient On (Oxygen Delivery Method): ventilator    O2 Concentration (%): 40    Neurological Exam:   Mental status: lethargic but able to follow some simple commands - squeezing finger on right and wiggling right toes.  Eyes: Left eye conjunctival injection. Right eye vertical nystagmus. Pupils are equal and reactive to light,  Unable to track  Face: R lower rhythmic synchronous facial twitching present  Motor: Flaccid tone in both upper extremities and lower extremities (0/5). Right hand able to squeeze fingers lightly (1/5). No abnormal movement in lower ext.  Sensation: Localizes to painful stimuli in RUE and b/l LE.  Coordination: Unable to asses   Reflexes: mute babinski. Hyperreflexive in RLE 4+/5, 3+ in all other areas. Toes mute  Cerebellar signs unable to assess.   Gait: Deferred    Labs:  CBC Full  -  ( 05 Mar 2025 07:38 )  WBC Count : 7.96 K/uL  RBC Count : 3.12 M/uL  Hemoglobin : 10.1 g/dL  Hematocrit : 30.2 %  Platelet Count - Automated : 218 K/uL  Mean Cell Volume : 96.8 fl  Mean Cell Hemoglobin : 32.4 pg  Mean Cell Hemoglobin Concentration : 33.4 g/dL  Auto Neutrophil # : x  Auto Lymphocyte # : x  Auto Monocyte # : x  Auto Eosinophil # : x  Auto Basophil # : x  Auto Neutrophil % : x  Auto Lymphocyte % : x  Auto Monocyte % : x  Auto Eosinophil % : x  Auto Basophil % : x    03-05    138  |  100  |  54[H]  ----------------------------<  115[H]  4.2   |  24  |  1.15    Ca    9.1      05 Mar 2025 07:38  Phos  4.3     03-05  Mg     2.1     03-05          Urinalysis Basic - ( 05 Mar 2025 07:38 )    Color: x / Appearance: x / SG: x / pH: x  Gluc: 115 mg/dL / Ketone: x  / Bili: x / Urobili: x   Blood: x / Protein: x / Nitrite: x   Leuk Esterase: x / RBC: x / WBC x   Sq Epi: x / Non Sq Epi: x / Bacteria: x       Neurology Progress Note    Interval History:  Overnight, no acute events. Unable to obtain ROS given patient's mental status. However, as per primary team, patient's mental status has improved since stopping the ativan.       PAST MEDICAL & SURGICAL HISTORY:  Acute myocardial infarction    CVA (cerebral vascular accident)      Medications:  acetaminophen   Oral Liquid .. 650 milliGRAM(s) Oral every 6 hours PRN  albuterol/ipratropium for Nebulization 3 milliLiter(s) Nebulizer every 4 hours  artificial tears (preservative free) Ophthalmic Solution 1 Drop(s) Both EYES every 4 hours  chlorhexidine 0.12% Liquid 15 milliLiter(s) Oral Mucosa every 12 hours  chlorhexidine 2% Cloths 1 Application(s) Topical <User Schedule>  doxazosin 4 milliGRAM(s) Oral at bedtime  erythromycin   Ointment 1 Application(s) Both EYES every 4 hours  fluticasone propionate 50 MICROgram(s)/spray Nasal Spray 1 Spray(s) Both Nostrils two times a day  gabapentin 300 milliGRAM(s) Oral every 8 hours  heparin   Injectable 5000 Unit(s) SubCutaneous every 8 hours  influenza   Vaccine 0.5 milliLiter(s) IntraMuscular once  lacosamide IVPB 200 milliGRAM(s) IV Intermittent every 12 hours  levETIRAcetam 1500 milliGRAM(s) Oral every 12 hours  LORazepam   Injectable 2 milliGRAM(s) IV Push once PRN  metoprolol tartrate 12.5 milliGRAM(s) Oral every 12 hours  pantoprazole   Suspension 40 milliGRAM(s) Oral daily  petrolatum Ophthalmic Ointment 1 Application(s) Both EYES every 4 hours  phenytoin   Suspension 150 milliGRAM(s) Oral <User Schedule>  phenytoin   Suspension 200 milliGRAM(s) Oral <User Schedule>  polyethylene glycol 3350 17 Gram(s) Oral daily  senna 2 Tablet(s) Oral at bedtime  sodium chloride 0.65% Nasal 1 Spray(s) Both Nostrils two times a day      Vital Signs Last 24 Hrs  T(C): 36.7 (05 Mar 2025 09:02), Max: 36.9 (04 Mar 2025 22:00)  T(F): 98 (05 Mar 2025 09:02), Max: 98.4 (04 Mar 2025 22:00)  HR: 98 (05 Mar 2025 06:11) (82 - 98)  BP: 109/73 (05 Mar 2025 05:16) (105/69 - 134/93)  BP(mean): 86 (05 Mar 2025 05:16) (82 - 109)  RR: 14 (05 Mar 2025 06:11) (14 - 15)  SpO2: 95% (05 Mar 2025 06:11) (94% - 98%)    Parameters below as of 05 Mar 2025 06:11  Patient On (Oxygen Delivery Method): ventilator    O2 Concentration (%): 40    Neurological Exam:   Mental status: lethargic but able to follow some simple commands - squeezing finger on right and wiggling right toes.  Eyes: Left eye conjunctival injection. Right eye vertical nystagmus. Pupils are equal and reactive to light,  Unable to track  Face: R lower rhythmic synchronous facial twitching present  Motor: Flaccid tone in both upper extremities and lower extremities (0/5). Right hand able to squeeze fingers lightly (1/5). No abnormal movement in lower ext.  Sensation: Localizes to painful stimuli in RUE and b/l LE.  Coordination: Unable to asses   Reflexes: mute babinski. Hyperreflexive in RLE 4+/5, 3+ in all other areas. Toes mute  Cerebellar signs unable to assess.   Gait: Deferred    Labs:  CBC Full  -  ( 05 Mar 2025 07:38 )  WBC Count : 7.96 K/uL  RBC Count : 3.12 M/uL  Hemoglobin : 10.1 g/dL  Hematocrit : 30.2 %  Platelet Count - Automated : 218 K/uL  Mean Cell Volume : 96.8 fl  Mean Cell Hemoglobin : 32.4 pg  Mean Cell Hemoglobin Concentration : 33.4 g/dL  Auto Neutrophil # : x  Auto Lymphocyte # : x  Auto Monocyte # : x  Auto Eosinophil # : x  Auto Basophil # : x  Auto Neutrophil % : x  Auto Lymphocyte % : x  Auto Monocyte % : x  Auto Eosinophil % : x  Auto Basophil % : x    03-05    138  |  100  |  54[H]  ----------------------------<  115[H]  4.2   |  24  |  1.15    Ca    9.1      05 Mar 2025 07:38  Phos  4.3     03-05  Mg     2.1     03-05        Urinalysis Basic - ( 05 Mar 2025 07:38 )    Color: x / Appearance: x / SG: x / pH: x  Gluc: 115 mg/dL / Ketone: x  / Bili: x / Urobili: x   Blood: x / Protein: x / Nitrite: x   Leuk Esterase: x / RBC: x / WBC x   Sq Epi: x / Non Sq Epi: x / Bacteria: x       Neurology Progress Note    Interval History:  Overnight, no acute events. Unable to obtain clear ROS given patient's non-verbal status and partial locked-in state. However, as per primary team, patient's mental status has improved since ativan pause, while seizure activity looks better, potentially from gabapentin.       PAST MEDICAL & SURGICAL HISTORY:  Acute myocardial infarction    CVA (cerebral vascular accident)      Medications:  acetaminophen   Oral Liquid .. 650 milliGRAM(s) Oral every 6 hours PRN  albuterol/ipratropium for Nebulization 3 milliLiter(s) Nebulizer every 4 hours  artificial tears (preservative free) Ophthalmic Solution 1 Drop(s) Both EYES every 4 hours  chlorhexidine 0.12% Liquid 15 milliLiter(s) Oral Mucosa every 12 hours  chlorhexidine 2% Cloths 1 Application(s) Topical <User Schedule>  doxazosin 4 milliGRAM(s) Oral at bedtime  erythromycin   Ointment 1 Application(s) Both EYES every 4 hours  fluticasone propionate 50 MICROgram(s)/spray Nasal Spray 1 Spray(s) Both Nostrils two times a day  gabapentin 300 milliGRAM(s) Oral every 8 hours  heparin   Injectable 5000 Unit(s) SubCutaneous every 8 hours  influenza   Vaccine 0.5 milliLiter(s) IntraMuscular once  lacosamide IVPB 200 milliGRAM(s) IV Intermittent every 12 hours  levETIRAcetam 1500 milliGRAM(s) Oral every 12 hours  LORazepam   Injectable 2 milliGRAM(s) IV Push once PRN  metoprolol tartrate 12.5 milliGRAM(s) Oral every 12 hours  pantoprazole   Suspension 40 milliGRAM(s) Oral daily  petrolatum Ophthalmic Ointment 1 Application(s) Both EYES every 4 hours  phenytoin   Suspension 150 milliGRAM(s) Oral <User Schedule>  phenytoin   Suspension 200 milliGRAM(s) Oral <User Schedule>  polyethylene glycol 3350 17 Gram(s) Oral daily  senna 2 Tablet(s) Oral at bedtime  sodium chloride 0.65% Nasal 1 Spray(s) Both Nostrils two times a day      Vital Signs Last 24 Hrs  T(C): 36.7 (05 Mar 2025 09:02), Max: 36.9 (04 Mar 2025 22:00)  T(F): 98 (05 Mar 2025 09:02), Max: 98.4 (04 Mar 2025 22:00)  HR: 98 (05 Mar 2025 06:11) (82 - 98)  BP: 109/73 (05 Mar 2025 05:16) (105/69 - 134/93)  BP(mean): 86 (05 Mar 2025 05:16) (82 - 109)  RR: 14 (05 Mar 2025 06:11) (14 - 15)  SpO2: 95% (05 Mar 2025 06:11) (94% - 98%)    Parameters below as of 05 Mar 2025 06:11  Patient On (Oxygen Delivery Method): ventilator    O2 Concentration (%): 40    Neurological Exam:   Mental status: lethargic but able to follow some simple commands - squeezing finger on right and wiggling right toes.  Eyes: Left eye conjunctival injection. Right eye vertical nystagmus. Pupils are equal and reactive to light,  Unable to track  Face: R lower rhythmic synchronous facial twitching present  Motor: Flaccid tone in both upper extremities and lower extremities (0/5). Right hand able to squeeze fingers lightly (1/5). No abnormal movement in lower ext.  Sensation: Localizes to painful stimuli in RUE and b/l LE.  Coordination: Unable to asses   Reflexes: mute babinski. Hyperreflexive in RLE 4+/5, 3+ in all other areas. Toes mute  Cerebellar signs unable to assess.   Gait: Deferred    Labs:  CBC Full  -  ( 05 Mar 2025 07:38 )  WBC Count : 7.96 K/uL  RBC Count : 3.12 M/uL  Hemoglobin : 10.1 g/dL  Hematocrit : 30.2 %  Platelet Count - Automated : 218 K/uL  Mean Cell Volume : 96.8 fl  Mean Cell Hemoglobin : 32.4 pg  Mean Cell Hemoglobin Concentration : 33.4 g/dL  Auto Neutrophil # : x  Auto Lymphocyte # : x  Auto Monocyte # : x  Auto Eosinophil # : x  Auto Basophil # : x  Auto Neutrophil % : x  Auto Lymphocyte % : x  Auto Monocyte % : x  Auto Eosinophil % : x  Auto Basophil % : x    03-05    138  |  100  |  54[H]  ----------------------------<  115[H]  4.2   |  24  |  1.15    Ca    9.1      05 Mar 2025 07:38  Phos  4.3     03-05  Mg     2.1     03-05        Urinalysis Basic - ( 05 Mar 2025 07:38 )    Color: x / Appearance: x / SG: x / pH: x  Gluc: 115 mg/dL / Ketone: x  / Bili: x / Urobili: x   Blood: x / Protein: x / Nitrite: x   Leuk Esterase: x / RBC: x / WBC x   Sq Epi: x / Non Sq Epi: x / Bacteria: x

## 2025-03-05 NOTE — PROGRESS NOTE ADULT - SUBJECTIVE AND OBJECTIVE BOX
Patient is a 44y old  Male who presents with a chief complaint of brain stem bleed (05 Mar 2025 09:29)    INTERVAL EVENTS:    SUBJECTIVE:  Patient was seen and examined at bedside.    Review of systems: No CP, dyspnea, nausea or vomiting, dysuria, LE edema.     Diet, NPO with Tube Feed:   Tube Feeding Modality: Gastrostomy  Maribeth Sauceda Renal  Total Volume for 24 Hours (mL): 1062  Total Number of Cans: 5  Continuous  Starting Tube Feed Rate mL per Hour: 59  Increase Tube Feed Rate by (mL): 0     Every 4 hours  Until Goal Tube Feed Rate (mL per Hour): 59  Tube Feed Duration (in Hours): 18  Tube Feed Start Time: 11:00  Tube Feed Stop Time: 05:00 (02-20-25 @ 10:42) [Active]      MEDICATIONS:  MEDICATIONS  (STANDING):  albuterol/ipratropium for Nebulization 3 milliLiter(s) Nebulizer every 4 hours  artificial tears (preservative free) Ophthalmic Solution 1 Drop(s) Both EYES every 4 hours  chlorhexidine 0.12% Liquid 15 milliLiter(s) Oral Mucosa every 12 hours  chlorhexidine 2% Cloths 1 Application(s) Topical <User Schedule>  doxazosin 4 milliGRAM(s) Oral at bedtime  erythromycin   Ointment 1 Application(s) Both EYES every 4 hours  fluticasone propionate 50 MICROgram(s)/spray Nasal Spray 1 Spray(s) Both Nostrils two times a day  gabapentin 300 milliGRAM(s) Oral every 8 hours  heparin   Injectable 5000 Unit(s) SubCutaneous every 8 hours  influenza   Vaccine 0.5 milliLiter(s) IntraMuscular once  lacosamide IVPB 200 milliGRAM(s) IV Intermittent every 12 hours  levETIRAcetam 1500 milliGRAM(s) Oral every 12 hours  LORazepam     Tablet 0.5 milliGRAM(s) Oral <User Schedule>  metoprolol tartrate 12.5 milliGRAM(s) Oral every 12 hours  pantoprazole   Suspension 40 milliGRAM(s) Oral daily  petrolatum Ophthalmic Ointment 1 Application(s) Both EYES every 4 hours  phenytoin   Suspension 150 milliGRAM(s) Oral <User Schedule>  phenytoin   Suspension 200 milliGRAM(s) Oral <User Schedule>  polyethylene glycol 3350 17 Gram(s) Oral daily  senna 2 Tablet(s) Oral at bedtime  sodium chloride 0.65% Nasal 1 Spray(s) Both Nostrils two times a day    MEDICATIONS  (PRN):  acetaminophen   Oral Liquid .. 650 milliGRAM(s) Oral every 6 hours PRN Temp greater or equal to 38C (100.4F), Mild Pain (1 - 3)  LORazepam   Injectable 2 milliGRAM(s) IV Push once PRN Seizure Activity (NOTIFY PROVIDER PRIOR TO GIVING)      Allergies    Allergy Status Unknown    Intolerances        OBJECTIVE:  Vital Signs Last 24 Hrs  T(C): 36.7 (05 Mar 2025 17:41), Max: 36.9 (04 Mar 2025 22:00)  T(F): 98.1 (05 Mar 2025 17:41), Max: 98.4 (04 Mar 2025 22:00)  HR: 78 (05 Mar 2025 20:07) (78 - 98)  BP: 122/88 (05 Mar 2025 20:07) (105/69 - 122/88)  BP(mean): 100 (05 Mar 2025 20:07) (82 - 100)  RR: 17 (05 Mar 2025 20:07) (14 - 17)  SpO2: 100% (05 Mar 2025 20:07) (95% - 100%)    Parameters below as of 05 Mar 2025 20:07  Patient On (Oxygen Delivery Method): ventilator    O2 Concentration (%): 40  I&O's Summary    04 Mar 2025 07:01  -  05 Mar 2025 07:00  --------------------------------------------------------  IN: 2222 mL / OUT: 700 mL / NET: 1522 mL    05 Mar 2025 07:01  -  05 Mar 2025 21:34  --------------------------------------------------------  IN: 2351 mL / OUT: 800 mL / NET: 1551 mL        PHYSICAL EXAM:  Gen: Reclining in bed at time of exam, appears stated age  HEENT: NCAT, MMM, clear OP  Neck: supple, trachea at midline  CV: RRR, +S1/S2  Pulm: adequate respiratory effort, no increase in work of breathing  Abd: soft, ND  Skin: warm and dry,   Ext:   Neuro: Alert, oriented,, speaking in full sentences  Psych: affect and behavior appropriate, pleasant at time of interview    LABS:                        10.1   7.96  )-----------( 218      ( 05 Mar 2025 07:38 )             30.2     03-05    138  |  100  |  54[H]  ----------------------------<  115[H]  4.2   |  24  |  1.15    Ca    9.1      05 Mar 2025 07:38  Phos  4.3     03-05  Mg     2.1     03-05          CAPILLARY BLOOD GLUCOSE        Urinalysis Basic - ( 05 Mar 2025 07:38 )    Color: x / Appearance: x / SG: x / pH: x  Gluc: 115 mg/dL / Ketone: x  / Bili: x / Urobili: x   Blood: x / Protein: x / Nitrite: x   Leuk Esterase: x / RBC: x / WBC x   Sq Epi: x / Non Sq Epi: x / Bacteria: x        MICRODATA:      RADIOLOGY/OTHER STUDIES:   Patient is a 44y old  Male who presents with a chief complaint of brain stem bleed (05 Mar 2025 09:29)    INTERVAL EVENTS:  - Does not appear in distress   - non verbal   - tolerating tube feeds   - HR, BP within acceptable range     SUBJECTIVE:  Patient was seen and examined at bedside.  Review of systems: Unable to perform review of systems 2/2 mental status     Diet, NPO with Tube Feed:   Tube Feeding Modality: Gastrostomy  Game[employer] Renal  Total Volume for 24 Hours (mL): 1062  Total Number of Cans: 5  Continuous  Starting Tube Feed Rate mL per Hour: 59  Increase Tube Feed Rate by (mL): 0     Every 4 hours  Until Goal Tube Feed Rate (mL per Hour): 59  Tube Feed Duration (in Hours): 18  Tube Feed Start Time: 11:00  Tube Feed Stop Time: 05:00 (02-20-25 @ 10:42) [Active]      MEDICATIONS:  MEDICATIONS  (STANDING):  albuterol/ipratropium for Nebulization 3 milliLiter(s) Nebulizer every 4 hours  artificial tears (preservative free) Ophthalmic Solution 1 Drop(s) Both EYES every 4 hours  chlorhexidine 0.12% Liquid 15 milliLiter(s) Oral Mucosa every 12 hours  chlorhexidine 2% Cloths 1 Application(s) Topical <User Schedule>  doxazosin 4 milliGRAM(s) Oral at bedtime  erythromycin   Ointment 1 Application(s) Both EYES every 4 hours  fluticasone propionate 50 MICROgram(s)/spray Nasal Spray 1 Spray(s) Both Nostrils two times a day  gabapentin 300 milliGRAM(s) Oral every 8 hours  heparin   Injectable 5000 Unit(s) SubCutaneous every 8 hours  influenza   Vaccine 0.5 milliLiter(s) IntraMuscular once  lacosamide IVPB 200 milliGRAM(s) IV Intermittent every 12 hours  levETIRAcetam 1500 milliGRAM(s) Oral every 12 hours  LORazepam     Tablet 0.5 milliGRAM(s) Oral <User Schedule>  metoprolol tartrate 12.5 milliGRAM(s) Oral every 12 hours  pantoprazole   Suspension 40 milliGRAM(s) Oral daily  petrolatum Ophthalmic Ointment 1 Application(s) Both EYES every 4 hours  phenytoin   Suspension 150 milliGRAM(s) Oral <User Schedule>  phenytoin   Suspension 200 milliGRAM(s) Oral <User Schedule>  polyethylene glycol 3350 17 Gram(s) Oral daily  senna 2 Tablet(s) Oral at bedtime  sodium chloride 0.65% Nasal 1 Spray(s) Both Nostrils two times a day    MEDICATIONS  (PRN):  acetaminophen   Oral Liquid .. 650 milliGRAM(s) Oral every 6 hours PRN Temp greater or equal to 38C (100.4F), Mild Pain (1 - 3)  LORazepam   Injectable 2 milliGRAM(s) IV Push once PRN Seizure Activity (NOTIFY PROVIDER PRIOR TO GIVING)      Allergies    Allergy Status Unknown    Intolerances        OBJECTIVE:  Vital Signs Last 24 Hrs  T(C): 36.7 (05 Mar 2025 17:41), Max: 36.9 (04 Mar 2025 22:00)  T(F): 98.1 (05 Mar 2025 17:41), Max: 98.4 (04 Mar 2025 22:00)  HR: 78 (05 Mar 2025 20:07) (78 - 98)  BP: 122/88 (05 Mar 2025 20:07) (105/69 - 122/88)  BP(mean): 100 (05 Mar 2025 20:07) (82 - 100)  RR: 17 (05 Mar 2025 20:07) (14 - 17)  SpO2: 100% (05 Mar 2025 20:07) (95% - 100%)    Parameters below as of 05 Mar 2025 20:07  Patient On (Oxygen Delivery Method): ventilator    O2 Concentration (%): 40  I&O's Summary    04 Mar 2025 07:01  -  05 Mar 2025 07:00  --------------------------------------------------------  IN: 2222 mL / OUT: 700 mL / NET: 1522 mL    05 Mar 2025 07:01  -  05 Mar 2025 21:34  --------------------------------------------------------  IN: 2351 mL / OUT: 800 mL / NET: 1551 mL        PHYSICAL EXAM:  Gen: Reclining in bed at time of exam, appears stated age  HEENT: MMM, clear OP  CV: RRR, +S1/S2  Abd: soft, ND  Skin: warm and dry,   Ext: no LE edema   Neuro: non verbal . not following simple instructions at time of exam today   Psych: not agitated    LABS:                        10.1   7.96  )-----------( 218      ( 05 Mar 2025 07:38 )             30.2     03-05    138  |  100  |  54[H]  ----------------------------<  115[H]  4.2   |  24  |  1.15    Ca    9.1      05 Mar 2025 07:38  Phos  4.3     03-05  Mg     2.1     03-05          CAPILLARY BLOOD GLUCOSE        Urinalysis Basic - ( 05 Mar 2025 07:38 )    Color: x / Appearance: x / SG: x / pH: x  Gluc: 115 mg/dL / Ketone: x  / Bili: x / Urobili: x   Blood: x / Protein: x / Nitrite: x   Leuk Esterase: x / RBC: x / WBC x   Sq Epi: x / Non Sq Epi: x / Bacteria: x        MICRODATA:      RADIOLOGY/OTHER STUDIES:

## 2025-03-05 NOTE — PROGRESS NOTE ADULT - ASSESSMENT
44M with unclear PMH (?stroke, MI) who was found down at work, intubated for airway protection and found to have acute parenchymal hemorrhage within nabil with mass effect (+ acute/subacute right cerebellar infarct) in setting of hypertensive emergency, transferred to [facility] for further neurosurgical care. Hospital course c/b poor neurologic recovery s/p trach-PEG, AUR s/p gabriel, ANIKA on CKD c/b hyperkalemia, HAP s/p amp-sulbactam (EOT 10/23), K aerogenes bacteremia  treated with 2 weeks of Cefepime per ID , On 11/15 he became septic and was transferred to NSICU due to increased o2 requirements and needed Vent support , Trach Cultures + for Stenotrophomonas which was treated with 7 days of Bactrim per ID , has been weaned of Vent since 11/23 and is now on 10lts at 40% o2 through Trach Collar. Stepped up to the NSICU on 12/15 for hypoxia and tachycardia. Pt s/p  abx for 5 days. Pt now stepped down to 8Lach.      # Pontine hemorrhage  # Encephalopathy due to Intracranial Bleed   # Seizure   - Acute parenchymal hemorrhage within nabil with mass effect (+ acute/subacute right cerebellar infarct) in setting of hypertensive emergency.   - MRI brain also demonstrated acute/subacute R cerebellar stroke.   - No Neurosurgical Interventions were performed   - Trach, Vent and PEG Dependent    # Seizures  - On Levetiracetam 1500 mg q12h, Phenytoin 200 mg daily and w Vimpat 200 mg q12h. Gabapentin 300 mg q8h started. Remains on EEG, Epilepsy team following   - Seizure precautions     # Hypertension  - On Metoprolol and Doxazosin     # Acute on Chronic respiratory failure   - Continue vent support and chest PT    # Neurogenic dysphagia.   - s/p PEG placement by surgery 10/21/24  - Continue TF per nutrition  - Continue aspiration precautions and elevation of HOB.    # Urinary retention  # BPH   - doxazosin 4mg qHS   - [place]/Condom Catheter     # Functional quadriplegia in setting of brainstem hemorrhage  - decub precautions  - care per nursing protocol     # CKD stage III   - Continue Free water via PEG @ 200q6h  - Continue to monitor creatinine, avoid nephrotoxics     # Anemia of Chronic Disease   -Will continue to monitor H/H stable ~10     DVT ppx: SQH  Discussed with primary team      44M with unclear PMH (?stroke, MI) who was found down at work, intubated for airway protection and found to have acute parenchymal hemorrhage within nabil with mass effect (+ acute/subacute right cerebellar infarct) in setting of hypertensive emergency, transferred to [facility] for further neurosurgical care. Hospital course c/b poor neurologic recovery s/p trach-PEG, AUR s/p gabriel, ANIKA on CKD c/b hyperkalemia, HAP s/p amp-sulbactam (EOT 10/23), K aerogenes bacteremia  treated with 2 weeks of Cefepime per ID , On 11/15 he became septic and was transferred to NSICU due to increased o2 requirements and needed Vent support , Trach Cultures + for Stenotrophomonas which was treated with 7 days of Bactrim per ID , has been weaned of Vent since 11/23 and is now on 10lts at 40% o2 through Trach Collar. Stepped up to the NSICU on 12/15 for hypoxia and tachycardia. Pt s/p  abx for 5 days. Pt now stepped down to 8Lach.    # Pontine hemorrhage  # Encephalopathy due to Intracranial Bleed   # Seizure   - Acute parenchymal hemorrhage within nabil with mass effect (+ acute/subacute right cerebellar infarct) in setting of hypertensive emergency.   - MRI brain also demonstrated acute/subacute R cerebellar stroke.   - No Neurosurgical Interventions were performed   - Trach, Vent and PEG Dependent    # Seizures  - On Levetiracetam 1500 mg q12h, Phenytoin 150mg qAM, qNoon, and 200 mg qHS ; Vimpat 200 mg q12h. Gabapentin 300 mg q8h started. Remains on EEG, Epilepsy team following   - Seizure precautions   [ ] Patient has been immobile for several months. Cr may overestimate renal function. Check cystatin C ; If GFR by Cystatin C much lower than eGFR by Cr, discuss with epilepsy if seizure medications (e.g. gabapentin) need to be dose adjusted for renal function      # Hypertension  - On Metoprolol and Doxazosin     # Acute on Chronic respiratory failure   - Continue vent support and chest PT    # Neurogenic dysphagia.   - s/p PEG placement by surgery 10/21/24  - Continue TF per nutrition  - Continue aspiration precautions and elevation of HOB.    # Urinary retention  # BPH   - doxazosin 4mg qHS   - [place]/Condom Catheter     # Functional quadriplegia in setting of brainstem hemorrhage  - decub precautions  - care per nursing protocol     # CKD stage III   - Continue Free water via PEG @ 200q6h  - Continue to monitor creatinine, avoid nephrotoxics   [ ] Patient has been immobile for several months. Cr may overestimate renal function. Check cystatin C ; If GFR by Cystatin C much lower than eGFR by Cr, discuss with epilepsy if seizure medications (e.g. gabapentin) need to be dose adjusted for renal function      # Anemia of Chronic Disease   -Will continue to monitor H/H stable ~10     DVT ppx: [facility]  Used Language Line  to speak with patient's son Chauncey who was at bedside in [place] ; Family meeting with primary team pending

## 2025-03-05 NOTE — PROGRESS NOTE ADULT - TIME BILLING
Bedside exam and interview   Reviewed vitals, labs,   Discussed patient's plan of care with primary team   Documentation of encounter Review of hospital course, labs, vitals, medical records.   Bedside exam and interview   Discussed plan of care with primary team ACP  Documenting the encounter  Excludes teaching time and/or separately reported services

## 2025-03-06 LAB
ANION GAP SERPL CALC-SCNC: 13 MMOL/L — SIGNIFICANT CHANGE UP (ref 5–17)
BUN SERPL-MCNC: 57 MG/DL — HIGH (ref 7–23)
CALCIUM SERPL-MCNC: 9.3 MG/DL — SIGNIFICANT CHANGE UP (ref 8.4–10.5)
CHLORIDE SERPL-SCNC: 97 MMOL/L — SIGNIFICANT CHANGE UP (ref 96–108)
CO2 SERPL-SCNC: 24 MMOL/L — SIGNIFICANT CHANGE UP (ref 22–31)
CREAT SERPL-MCNC: 1.21 MG/DL — SIGNIFICANT CHANGE UP (ref 0.5–1.3)
CYSTATIN C SERPL-MCNC: 2.69 MG/L — HIGH (ref 0.68–1.22)
EGFR: 75 ML/MIN/1.73M2 — SIGNIFICANT CHANGE UP
EGFR: 75 ML/MIN/1.73M2 — SIGNIFICANT CHANGE UP
GFR/BSA.PRED SERPLBLD CYS-BASED-ARV: 22 ML/MIN/1.73M2 — LOW
GLUCOSE SERPL-MCNC: 120 MG/DL — HIGH (ref 70–99)
HCT VFR BLD CALC: 32 % — LOW (ref 39–50)
HGB BLD-MCNC: 10.5 G/DL — LOW (ref 13–17)
MAGNESIUM SERPL-MCNC: 2.4 MG/DL — SIGNIFICANT CHANGE UP (ref 1.6–2.6)
MCHC RBC-ENTMCNC: 31.7 PG — SIGNIFICANT CHANGE UP (ref 27–34)
MCHC RBC-ENTMCNC: 32.8 G/DL — SIGNIFICANT CHANGE UP (ref 32–36)
MCV RBC AUTO: 96.7 FL — SIGNIFICANT CHANGE UP (ref 80–100)
NRBC BLD AUTO-RTO: 0 /100 WBCS — SIGNIFICANT CHANGE UP (ref 0–0)
PHOSPHATE SERPL-MCNC: 4.3 MG/DL — SIGNIFICANT CHANGE UP (ref 2.5–4.5)
PLATELET # BLD AUTO: 238 K/UL — SIGNIFICANT CHANGE UP (ref 150–400)
POTASSIUM SERPL-MCNC: 4.4 MMOL/L — SIGNIFICANT CHANGE UP (ref 3.5–5.3)
POTASSIUM SERPL-SCNC: 4.4 MMOL/L — SIGNIFICANT CHANGE UP (ref 3.5–5.3)
RBC # BLD: 3.31 M/UL — LOW (ref 4.2–5.8)
RBC # FLD: 12.5 % — SIGNIFICANT CHANGE UP (ref 10.3–14.5)
SODIUM SERPL-SCNC: 134 MMOL/L — LOW (ref 135–145)
WBC # BLD: 7.28 K/UL — SIGNIFICANT CHANGE UP (ref 3.8–10.5)
WBC # FLD AUTO: 7.28 K/UL — SIGNIFICANT CHANGE UP (ref 3.8–10.5)

## 2025-03-06 PROCEDURE — 99233 SBSQ HOSP IP/OBS HIGH 50: CPT

## 2025-03-06 PROCEDURE — 95720 EEG PHY/QHP EA INCR W/VEEG: CPT

## 2025-03-06 PROCEDURE — 99231 SBSQ HOSP IP/OBS SF/LOW 25: CPT

## 2025-03-06 RX ADMIN — Medication 1 APPLICATION(S): at 18:52

## 2025-03-06 RX ADMIN — Medication 200 MILLIGRAM(S): at 19:51

## 2025-03-06 RX ADMIN — Medication 1 APPLICATION(S): at 05:28

## 2025-03-06 RX ADMIN — METOPROLOL SUCCINATE 12.5 MILLIGRAM(S): 50 TABLET, EXTENDED RELEASE ORAL at 18:51

## 2025-03-06 RX ADMIN — Medication 650 MILLIGRAM(S): at 10:23

## 2025-03-06 RX ADMIN — DOXAZOSIN MESYLATE 4 MILLIGRAM(S): 8 TABLET ORAL at 21:08

## 2025-03-06 RX ADMIN — GABAPENTIN 300 MILLIGRAM(S): 400 CAPSULE ORAL at 15:33

## 2025-03-06 RX ADMIN — Medication 1 APPLICATION(S): at 21:10

## 2025-03-06 RX ADMIN — Medication 1 APPLICATION(S): at 01:15

## 2025-03-06 RX ADMIN — Medication 1 SPRAY(S): at 05:28

## 2025-03-06 RX ADMIN — Medication 150 MILLIGRAM(S): at 05:30

## 2025-03-06 RX ADMIN — ERYTHROMYCIN 1 APPLICATION(S): 5 OINTMENT OPHTHALMIC at 18:52

## 2025-03-06 RX ADMIN — Medication 0.5 MILLIGRAM(S): at 21:09

## 2025-03-06 RX ADMIN — GABAPENTIN 300 MILLIGRAM(S): 400 CAPSULE ORAL at 05:25

## 2025-03-06 RX ADMIN — Medication 1 APPLICATION(S): at 10:30

## 2025-03-06 RX ADMIN — Medication 650 MILLIGRAM(S): at 18:49

## 2025-03-06 RX ADMIN — Medication 1 DROP(S): at 01:15

## 2025-03-06 RX ADMIN — Medication 1 DROP(S): at 14:50

## 2025-03-06 RX ADMIN — POLYETHYLENE GLYCOL 3350 17 GRAM(S): 17 POWDER, FOR SOLUTION ORAL at 11:28

## 2025-03-06 RX ADMIN — Medication 15 MILLILITER(S): at 18:50

## 2025-03-06 RX ADMIN — Medication 1 DROP(S): at 18:51

## 2025-03-06 RX ADMIN — HEPARIN SODIUM 5000 UNIT(S): 1000 INJECTION INTRAVENOUS; SUBCUTANEOUS at 21:09

## 2025-03-06 RX ADMIN — Medication 650 MILLIGRAM(S): at 19:19

## 2025-03-06 RX ADMIN — Medication 40 MILLIGRAM(S): at 11:30

## 2025-03-06 RX ADMIN — Medication 150 MILLIGRAM(S): at 11:30

## 2025-03-06 RX ADMIN — Medication 1 APPLICATION(S): at 05:27

## 2025-03-06 RX ADMIN — Medication 1 SPRAY(S): at 18:51

## 2025-03-06 RX ADMIN — LACOSAMIDE 140 MILLIGRAM(S): 150 TABLET, FILM COATED ORAL at 23:17

## 2025-03-06 RX ADMIN — ERYTHROMYCIN 1 APPLICATION(S): 5 OINTMENT OPHTHALMIC at 05:29

## 2025-03-06 RX ADMIN — IPRATROPIUM BROMIDE AND ALBUTEROL SULFATE 3 MILLILITER(S): .5; 2.5 SOLUTION RESPIRATORY (INHALATION) at 21:09

## 2025-03-06 RX ADMIN — LEVETIRACETAM 1500 MILLIGRAM(S): 10 INJECTION, SOLUTION INTRAVENOUS at 18:50

## 2025-03-06 RX ADMIN — Medication 1 DROP(S): at 10:30

## 2025-03-06 RX ADMIN — FLUTICASONE PROPIONATE 1 SPRAY(S): 50 SPRAY, METERED NASAL at 18:51

## 2025-03-06 RX ADMIN — Medication 1 APPLICATION(S): at 14:51

## 2025-03-06 RX ADMIN — ERYTHROMYCIN 1 APPLICATION(S): 5 OINTMENT OPHTHALMIC at 15:33

## 2025-03-06 RX ADMIN — Medication 650 MILLIGRAM(S): at 10:53

## 2025-03-06 RX ADMIN — IPRATROPIUM BROMIDE AND ALBUTEROL SULFATE 3 MILLILITER(S): .5; 2.5 SOLUTION RESPIRATORY (INHALATION) at 05:26

## 2025-03-06 RX ADMIN — IPRATROPIUM BROMIDE AND ALBUTEROL SULFATE 3 MILLILITER(S): .5; 2.5 SOLUTION RESPIRATORY (INHALATION) at 14:50

## 2025-03-06 RX ADMIN — Medication 2 TABLET(S): at 21:08

## 2025-03-06 RX ADMIN — IPRATROPIUM BROMIDE AND ALBUTEROL SULFATE 3 MILLILITER(S): .5; 2.5 SOLUTION RESPIRATORY (INHALATION) at 10:23

## 2025-03-06 RX ADMIN — Medication 15 MILLILITER(S): at 05:26

## 2025-03-06 RX ADMIN — ERYTHROMYCIN 1 APPLICATION(S): 5 OINTMENT OPHTHALMIC at 01:15

## 2025-03-06 RX ADMIN — FLUTICASONE PROPIONATE 1 SPRAY(S): 50 SPRAY, METERED NASAL at 05:28

## 2025-03-06 RX ADMIN — LACOSAMIDE 140 MILLIGRAM(S): 150 TABLET, FILM COATED ORAL at 11:31

## 2025-03-06 RX ADMIN — IPRATROPIUM BROMIDE AND ALBUTEROL SULFATE 3 MILLILITER(S): .5; 2.5 SOLUTION RESPIRATORY (INHALATION) at 18:51

## 2025-03-06 RX ADMIN — ERYTHROMYCIN 1 APPLICATION(S): 5 OINTMENT OPHTHALMIC at 10:23

## 2025-03-06 RX ADMIN — HEPARIN SODIUM 5000 UNIT(S): 1000 INJECTION INTRAVENOUS; SUBCUTANEOUS at 05:25

## 2025-03-06 RX ADMIN — ERYTHROMYCIN 1 APPLICATION(S): 5 OINTMENT OPHTHALMIC at 21:09

## 2025-03-06 RX ADMIN — HEPARIN SODIUM 5000 UNIT(S): 1000 INJECTION INTRAVENOUS; SUBCUTANEOUS at 15:33

## 2025-03-06 RX ADMIN — IPRATROPIUM BROMIDE AND ALBUTEROL SULFATE 3 MILLILITER(S): .5; 2.5 SOLUTION RESPIRATORY (INHALATION) at 01:14

## 2025-03-06 RX ADMIN — LEVETIRACETAM 1500 MILLIGRAM(S): 10 INJECTION, SOLUTION INTRAVENOUS at 05:27

## 2025-03-06 RX ADMIN — Medication 1 DROP(S): at 05:28

## 2025-03-06 RX ADMIN — Medication 1 DROP(S): at 21:10

## 2025-03-06 RX ADMIN — GABAPENTIN 300 MILLIGRAM(S): 400 CAPSULE ORAL at 21:08

## 2025-03-06 RX ADMIN — METOPROLOL SUCCINATE 12.5 MILLIGRAM(S): 50 TABLET, EXTENDED RELEASE ORAL at 05:26

## 2025-03-06 NOTE — PROGRESS NOTE ADULT - PROBLEM SELECTOR PLAN 3
.  -management as per primary team   -still having seizure activity, but better controlled as per Epilepsy.  -hospice eligible if within GOC

## 2025-03-06 NOTE — EEG REPORT - NS EEG TEXT BOX
[facility] Department of Neurology  Inpatient Epilepsy Monitoring Unit video-Electroencephalography Report    Acquisition Details:  Electroencephalography was acquired using a minimum of 21 channels on an [employer] System Neurology system v 9.3.1 with electrode placement according to the standard International 10-20 system following ACNS (American Clinical Neurophysiology Society) guidelines for Long-Term Video EEG monitoring.  Anterior temporal T1 and T2 electrodes were utilized whenever possible.   The [employer] automated spike & seizure detections were all reviewed in detail, in addition to extensive portions of raw EEG.  Specially-trained nurses were available for seizure-related events.  Continuous live-time video monitoring of the patients for seizure-related and safety events was performed by specially-trained technicians.      Day 5:  3/5/2025, 7:00 AM to next morning at 07:00 AM   Meds:keppra 1500 mg bid, phenytoin 150/150/200, vimpat 200 mg bid, , gabapentin 300 mg Q8hr  Background:  continuous, with predominantly theta/delta  Generalized Slowing:  Intermittent frequent sporadic polymorphic delta  Symmetry/Focality: No persistent asymmetries of voltage or frequency.   However some artifacts over right frontotemporal area  limited interpretation  Voltage:  Low (most <20uV LBP Peak-Trough)  Organization:  Absent  Posterior Dominant Rhythm:  No clear PDR   Sleep:  Rudimentary but likely N2 transients present.  Variability:   Yes		Reactivity:  Unclear    Spontaneous Activity:  No epileptiform discharges     Events:Frequent muscle artifact over right fronto-temporal associated with right face/lip twitching without ictal changes on EEG  which could be focal motor seizure vs movement abnormality    Provocations:  •	Hyperventilation: was not performed.  •	Photic stimulation: was not performed.    Daily Summary:    1)	Continuous right face twitching, with EMG artifact over right fronto-temporal  without ictal changes on EEG which could represent  focal motor  seizures vs movement abnormality  2)	Moderate to severe generalized slowing indicating similar degree of diffuse cerebral dysfunction    Bessie Martinez MD  CNP Fellow

## 2025-03-06 NOTE — PROGRESS NOTE ADULT - ASSESSMENT
44M with unclear PMH (?stroke, MI) who was found down at work, intubated for airway protection and found to have acute parenchymal hemorrhage within nabil with mass effect (+ acute/subacute right cerebellar infarct) in setting of hypertensive emergency, transferred to [facility] for further neurosurgical care. Hospital course c/b poor neurologic recovery s/p trach-PEG, AUR s/p gabriel, ANIKA on CKD c/b hyperkalemia, HAP s/p amp-sulbactam (EOT 10/23), K aerogenes bacteremia  treated with 2 weeks of Cefepime per ID , On 11/15 he became septic and was transferred to NSICU due to increased o2 requirements and needed Vent support , Trach Cultures + for Stenotrophomonas which was treated with 7 days of Bactrim per ID , has been weaned of Vent since 11/23 and is now on 10lts at 40% o2 through Trach Collar. Stepped up to the NSICU on 12/15 for hypoxia and tachycardia. Pt s/p  abx for 5 days. Pt now stepped down to 8Lach.    # Pontine hemorrhage  # Encephalopathy due to Intracranial Bleed   # Seizure   - Acute parenchymal hemorrhage within nabil with mass effect (+ acute/subacute right cerebellar infarct) in setting of hypertensive emergency.   - MRI brain also demonstrated acute/subacute R cerebellar stroke.   - No Neurosurgical Interventions were performed   - Trach, Vent and PEG Dependent    # Seizures  - On Levetiracetam 1500 mg q12h, Phenytoin 150mg qAM, qNoon, and 200 mg qHS ; Vimpat 200 mg q12h. Gabapentin 300 mg q8h started. Remains on EEG, Epilepsy team following, discuss dosage adjustment re kidney function   - Seizure precautions     # Hypertension  - On Metoprolol and Doxazosin     # Acute on Chronic respiratory failure   - Continue vent support and chest PT    # Neurogenic dysphagia.   - s/p PEG placement by surgery 10/21/24  - Continue TF per nutrition  - Continue aspiration precautions and elevation of HOB.    # Urinary retention  # BPH   - doxazosin 4mg qHS   - [place]/Condom Catheter     # Functional quadriplegia in setting of brainstem hemorrhage  - decub precautions  - care per nursing protocol     # CKD stage IV  - Continue Free water via PEG @ 200q6h  - Continue to monitor creatinine, avoid nephrotoxics   - Cystatin C noted, will need to discuss with Epilepsy team re dosing of Gabapentin     # Anemia of Chronic Disease   - Continue to monitor H/H stable ~10     DVT ppx: SQH

## 2025-03-06 NOTE — PROGRESS NOTE ADULT - SUBJECTIVE AND OBJECTIVE BOX
[facility] Geriatrics and Palliative Care  Roger Fernandez, Geriatrics & Palliative Medicine attending  Contact Info: Call [phone] (HEAL Line) or message on [employer] Teams    SUBJECTIVE AND OBJECTIVE:  The patient was seen in his room. His son, Olivier, was also present during my evaluation.  History taking was challenging due to patient's neurologic impairments. he was able to communicate with tiny movements of his R hand.     The patient replied "no" to pain or shortness of breath.  The palliative care team attempted to explain to him he has been dependent on a ventilator and that there is a low chance he will ever get off of it. The patient replied "yes" when asked if he was understanding it.  When given the opportunity to ask questions, we asked the patient to reply "yes" if he wanted more information about specifics .. ventilator?-> "no"; where he is? -> "no"; what happens next -> "yes". We explained to the patient the plan is for him to go to a facility where they can continue his vent care. His son, shared hope he would be able to recover form this. When asked if he was following/understanding, the patient replied "yes".    Olivier reports concern about where the patient will be sent to and when it will happen. We explained that his dispo is dependent on insurance approval at the moment.  Emotional support provided to Olivier.    INTERVAL HPI/OVERNIGHT EVENTS: Interval events noted. See patient's PRN use for the past 24hrs noted below.     ALLERGIES:  Allergy Status Unknown    MEDICATIONS  (STANDING):  albuterol/ipratropium for Nebulization 3 milliLiter(s) Nebulizer every 4 hours  artificial tears (preservative free) Ophthalmic Solution 1 Drop(s) Both EYES every 4 hours  chlorhexidine 0.12% Liquid 15 milliLiter(s) Oral Mucosa every 12 hours  chlorhexidine 2% Cloths 1 Application(s) Topical <User Schedule>  doxazosin 4 milliGRAM(s) Oral at bedtime  erythromycin   Ointment 1 Application(s) Both EYES every 4 hours  fluticasone propionate 50 MICROgram(s)/spray Nasal Spray 1 Spray(s) Both Nostrils two times a day  gabapentin 300 milliGRAM(s) Oral every 8 hours  heparin   Injectable 5000 Unit(s) SubCutaneous every 8 hours  influenza   Vaccine 0.5 milliLiter(s) IntraMuscular once  lacosamide IVPB 200 milliGRAM(s) IV Intermittent every 12 hours  levETIRAcetam 1500 milliGRAM(s) Oral every 12 hours  LORazepam     Tablet 0.5 milliGRAM(s) Oral <User Schedule>  metoprolol tartrate 12.5 milliGRAM(s) Oral every 12 hours  pantoprazole   Suspension 40 milliGRAM(s) Oral daily  petrolatum Ophthalmic Ointment 1 Application(s) Both EYES every 4 hours  phenytoin   Suspension 150 milliGRAM(s) Oral <User Schedule>  phenytoin   Suspension 200 milliGRAM(s) Oral <User Schedule>  polyethylene glycol 3350 17 Gram(s) Oral daily  senna 2 Tablet(s) Oral at bedtime  sodium chloride 0.65% Nasal 1 Spray(s) Both Nostrils two times a day    MEDICATIONS  (PRN):  acetaminophen   Oral Liquid .. 650 milliGRAM(s) Oral every 6 hours PRN Temp greater or equal to 38C (100.4F), Mild Pain (1 - 3)  LORazepam   Injectable 2 milliGRAM(s) IV Push once PRN Seizure Activity (NOTIFY PROVIDER PRIOR TO GIVING)      Analgesic Use (Scheduled and PRNs) for past 24 hours:    acetaminophen   Oral Liquid ..   650 milliGRAM(s) Oral (03-06-25 @ 18:49)   650 milliGRAM(s) Oral (03-06-25 @ 10:23)    gabapentin   300 milliGRAM(s) Oral (03-06-25 @ 21:08)   300 milliGRAM(s) Oral (03-06-25 @ 15:33)   300 milliGRAM(s) Oral (03-06-25 @ 05:25)    lacosamide IVPB   140 mL/Hr IV Intermittent (03-06-25 @ 11:31)   140 mL/Hr IV Intermittent (03-05-25 @ 22:28)    levETIRAcetam   1500 milliGRAM(s) Oral (03-06-25 @ 18:50)   1500 milliGRAM(s) Oral (03-06-25 @ 05:27)    LORazepam     Tablet   0.5 milliGRAM(s) Oral (03-06-25 @ 21:09)    phenytoin   Suspension   150 milliGRAM(s) Oral (03-06-25 @ 11:30)   150 milliGRAM(s) Oral (03-06-25 @ 05:30)    phenytoin   Suspension   200 milliGRAM(s) Oral (03-06-25 @ 19:51)      ITEMS UNCHECKED ARE NOT PRESENT  PRESENT SYMPTOMS/REVIEW OF SYSTEMS: []Unable to obtain due to poor mentation   Source if other than patient:  []Family   []Team     PHYSICAL EXAM  middle aged Male, lying on hospital bed, in NAD  trach + vent dependent  PEG tube in place  Abd is soft and nontender  Functional quadriplegia noted; able to close R hand on command; uses R hand movement (open/close) to communicate.    Vital Signs Last 24 Hrs  T(C): 36.3 (06 Mar 2025 18:14), Max: 36.8 (06 Mar 2025 04:29)  T(F): 97.4 (06 Mar 2025 18:14), Max: 98.3 (06 Mar 2025 04:29)  HR: 80 (06 Mar 2025 20:00) (74 - 84)  BP: 124/80 (06 Mar 2025 20:00) (108/73 - 124/80)  BP(mean): 96 (06 Mar 2025 20:00) (86 - 99)  RR: 17 (06 Mar 2025 20:00) (14 - 23)  SpO2: 94% (06 Mar 2025 20:00) (94% - 100%)    Parameters below as of 06 Mar 2025 20:00  Patient On (Oxygen Delivery Method): ventilator    O2 Concentration (%): 40    LABS: Personally reviewed and interpreted                        10.5   7.28  )-----------( 238      ( 06 Mar 2025 07:02 )             32.0   03-06    134[L]  |  97  |  57[H]  ----------------------------<  120[H]  4.4   |  24  |  1.21    Ca    9.3      06 Mar 2025 07:02  Phos  4.3     03-06  Mg     2.4     03-06        RADIOLOGY & ADDITIONAL STUDIES: Personally reviewed and interpreted  None new    DISCUSSION OF CASE: Family - to provide updates and emotional support; Primary Team/RN - to discuss plan of care

## 2025-03-06 NOTE — PROGRESS NOTE ADULT - TIME BILLING
Bedside exam and interview   Reviewed vitals, labs, telemetry review   Discussed patient's plan of care with primary team   Documentation of encounter

## 2025-03-06 NOTE — PROGRESS NOTE ADULT - SUBJECTIVE AND OBJECTIVE BOX
HPI:  Unknown age male, unknown past medical history (reported stroke and MI by coworkers) presented to [facility] with AMS, Pt was working at [employer] when he was found down by coworkers. EMS called and pt brought to [facility] ED. Intubated, sedated, started on cardene for SBPs in 200s. CT head showed brain stem bleed. Transferred to NSICU for further management.  (30 Sep 2024 12:55)    Subjective: Unable to assess subjective data 2/2 pt's neurologic status      Vital Signs Last 24 Hrs  T(C): 36.7 (05 Mar 2025 21:57), Max: 36.7 (05 Mar 2025 09:02)  T(F): 98.1 (05 Mar 2025 21:57), Max: 98.1 (05 Mar 2025 17:41)  HR: 84 (05 Mar 2025 23:35) (78 - 98)  BP: 108/73 (05 Mar 2025 23:35) (106/75 - 122/88)  BP(mean): 86 (05 Mar 2025 23:35) (86 - 100)  RR: 17 (05 Mar 2025 23:35) (14 - 17)  SpO2: 99% (05 Mar 2025 23:35) (95% - 100%)    Parameters below as of 05 Mar 2025 23:35  Patient On (Oxygen Delivery Method): ventilator    O2 Concentration (%): 40    I&O's Summary    04 Mar 2025 07:01  -  05 Mar 2025 07:00  --------------------------------------------------------  IN: 2222 mL / OUT: 700 mL / NET: 1522 mL    05 Mar 2025 07:01  -  06 Mar 2025 01:16  --------------------------------------------------------  IN: 2669 mL / OUT: 800 mL / NET: 1869 mL        PHYSICAL EXAM:  Constitutional: NAD, on eeg  Respiratory: breathing non-labored, symmetrical chest wall movement, trache to full vent  Cardiovascuar: RRR  Gastrointestinal: abdomen soft, non tender, peg in place  Genitourinary: exam deffered  Neurological:  AAOX0. Opens eyes to deep stimulus. PERRL. Continuous facial twitching.   Face symmetric  Motor: Right hand  2/5. Right upper 0/5 otherwise. BL LE withdraws to noxious. LUE 0/5  Sensation: unable to be tested  Pronator Drift: unable to be tested  Dysmetria: unable to be tested      LABS:                        10.1   7.96  )-----------( 218      ( 05 Mar 2025 07:38 )             30.2     03-05    138  |  100  |  54[H]  ----------------------------<  115[H]  4.2   |  24  |  1.15    Ca    9.1      05 Mar 2025 07:38  Phos  4.3     03-05  Mg     2.1     03-05        Urinalysis Basic - ( 05 Mar 2025 07:38 )    Color: x / Appearance: x / SG: x / pH: x  Gluc: 115 mg/dL / Ketone: x  / Bili: x / Urobili: x   Blood: x / Protein: x / Nitrite: x   Leuk Esterase: x / RBC: x / WBC x   Sq Epi: x / Non Sq Epi: x / Bacteria: x          CAPILLARY BLOOD GLUCOSE          Drug Levels: [] N/A  Phenytoin Level, Serum: 17.3 ug/mL (03-01 @ 19:25)    CSF Analysis: [] N/A      Allergies    Allergy Status Unknown    Intolerances      MEDICATIONS:  Antibiotics:    Neuro:  acetaminophen   Oral Liquid .. 650 milliGRAM(s) Oral every 6 hours PRN  gabapentin 300 milliGRAM(s) Oral every 8 hours  lacosamide IVPB 200 milliGRAM(s) IV Intermittent every 12 hours  levETIRAcetam 1500 milliGRAM(s) Oral every 12 hours  LORazepam     Tablet 0.5 milliGRAM(s) Oral <User Schedule>  LORazepam   Injectable 2 milliGRAM(s) IV Push once PRN  phenytoin   Suspension 150 milliGRAM(s) Oral <User Schedule>  phenytoin   Suspension 200 milliGRAM(s) Oral <User Schedule>    Anticoagulation:  heparin   Injectable 5000 Unit(s) SubCutaneous every 8 hours    OTHER:  albuterol/ipratropium for Nebulization 3 milliLiter(s) Nebulizer every 4 hours  artificial tears (preservative free) Ophthalmic Solution 1 Drop(s) Both EYES every 4 hours  chlorhexidine 0.12% Liquid 15 milliLiter(s) Oral Mucosa every 12 hours  chlorhexidine 2% Cloths 1 Application(s) Topical <User Schedule>  doxazosin 4 milliGRAM(s) Oral at bedtime  erythromycin   Ointment 1 Application(s) Both EYES every 4 hours  fluticasone propionate 50 MICROgram(s)/spray Nasal Spray 1 Spray(s) Both Nostrils two times a day  influenza   Vaccine 0.5 milliLiter(s) IntraMuscular once  metoprolol tartrate 12.5 milliGRAM(s) Oral every 12 hours  pantoprazole   Suspension 40 milliGRAM(s) Oral daily  petrolatum Ophthalmic Ointment 1 Application(s) Both EYES every 4 hours  polyethylene glycol 3350 17 Gram(s) Oral daily  senna 2 Tablet(s) Oral at bedtime  sodium chloride 0.65% Nasal 1 Spray(s) Both Nostrils two times a day    IVF:    CULTURES:    RADIOLOGY & ADDITIONAL TESTS:      ASSESSMENT:  44M PMH ?stroke/MI present to [facility] after collapsing at work. Decorticate posturing, vomiting, intubated for airway protection. 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 Transferred to [facility] for further management. s/p trach 10/14. s/p peg 10/21. Re-upgrade to ICU 2/2 desaturation event and suctioning requirements 11/15. Re-upgrade to NSICU 12/15 2/2 desaturation and tachycardia.    Neuro:  - neuro/vitals q4h  - pain control: tylenol prn  - seizure tx: keppra 1500mg BID, phenytoin 150/150/200, vimpat IV  200mg BID, gabapentin 300 mg q8, ativan 0.5mg at bedtime (3/5-3/9)   - vEEG (3/1- ), s/p vEEG: +seizure on 2/17, 3/1  - CTH 9/30: enlarged pontine hemorrhage, CTH 10/3: stable, CTH 10/25: mostly resolved pontine hemorrhage, CTH 12/15: R mastoid air cell opacification; acute otitis media vs sterile effusion, CTH 12/18: stable. CTH 1/21 stable, CTH 1/27 stable, CTH 3/1: stable   - MRI brain 10/12: parenchymal hemorrhage, acute/subacute R cerebellar stroke      CV:  - -160  - HTN/tachycardia: Metoprolol 12.5 mg BID   - echo (9/30) EF 75%, repeat 12/16: 57%  - OR 3/3: 172    PULM:  - s/p trach exchange with pulm at bedside 2/14 to vent, AC/VC 40/400/14/6  - Secretions: duonebs/chest PT q4h  - CT chest 2/17: Near complete collapse b/l lower lobes. Patchy opacity within LLL/ALONDRA  - pulmonology consulted, recs appreciated    GI:  - TFs via PEG, [facility]  - bowel regimen, last BM 3/1    Renal:  - IVL; FW 200q6 for hydration  - Voiding, SC prn  - CKD: trend BUN/Cr  - renal US 10/1, 10/8, 1/15: Increased bilateral renal echogenicity consistent with medical renal disease  - urinary retention: doxazosin 4mg at bedtime     Endo:  - A1c 5.4    Heme:  - DVT ppx: SCDs, SQH 5000u q8h   - LE dopplers negative 12/16    ID:  - Afebrile, last pancx 2/17  - sputum 2/16 +Enterobacter cloacae: s/p Ertapenem (2/19-23) per ID recs, conolonized with stenotrophomonas  - empiric PNA: cefepime (12/22-12/30), s/p vanc (12/22-12/23), s/p zosyn (12/15-12/20), s/p vanc (12/15-12/16), s/p zosyn (1/12 -1/18), s/p DS bactrim 2 tabs TID, 200 mg minocycline BID (2/18)  - Hx +klebsiella UTI & bacteremia, +stenotrophomopnas maltophilia PNA, +Enterobacter cloacae PNA     MISC:  - BL keratitis: BL erythromycin ointment, artificial tears, lacrilube q4, moisture chamber   - Penile wound: wound care rec barrier wipes     Dispo: SDU status, DNR, pending SNF    D/w Dr. D'Amico

## 2025-03-06 NOTE — PROGRESS NOTE ADULT - SUBJECTIVE AND OBJECTIVE BOX
Ethics:    See full consult not from 10/3/2024.    Ethics continues to follow.    Ethics joined in family meeting with the patient and patient's son, Olivier.    Discussion with patient, Olivier (patient's son), CATA Haile (Palliative Care Patient and Family Support), Dr. Farley (Neurology) and Arabic  (13:00-13:30): Met patient at bedside. While the patient was able to follow simple commands and may answer simple yes/no questions, he was unable to convey understanding of the extent of his condition nor participate in care conversations. Dr. Farley shared with Olivier regarding his father's care progress, i.e., that he would likely remain in his current state and unlikely to improve, and that he may continue to have periodic seizures. The team has been adjusting the patient's medication to best manage the seizure activity. When explored, Olivier shared his belief of what would be important to his father regarding his present state and continuing care, i.e., being alive is more important over quality of life. Olivier shared that his father would want to continue living with the comfort of knowing that his son is at his bedside. Olivier did share the weight of being his father's surrogate decision maker. Ethics shared that if there are decisions that he may be unsure about that he may the decisions to the expertise of the clinical team. Olivier expressed understanding and conveyed the importance to him of remaining his father's surrogate and family's representative.     Discussion with patient, Olivier (patient's son), CATA Haile (Palliative Care Patient and Family Support), ISAAK Fernandez MD (Palliative Care Attending) (13:35-13:45). Palliative Care and Ethics offered to remain available to support Olivier with care decisions. When explored Olivier was amenable with the plan for patient to be discharged to [facility].     Presently, Olivier (patient's son) remains recognized as the patient's surrogate decision-maker while the patient remains without capacity to participate in care decisions (see full consult note from 10/3/2024). Goals of care are to continue treatment with plan to discharge.       Lilo Harmon, MSN, RN, PAYAM, [facility]  Ethics Fellow  [phone]  [email or web address]  (available via teams)       Ethics:    See full consult note from 10/3/2024.    Ethics continues to follow.    Ethics joined in family meeting with the patient and patient's son, Olivier.    Discussion with patient, Olivier (patient's son), CATA Haile (Palliative Care Patient and Family Support), Dr. Farley (Neurology) and Sami  (13:00-13:30): Met patient at bedside. While the patient was able to follow simple commands and may answer simple yes/no questions, he was unable to convey understanding of the extent of his condition nor participate in care conversations. Dr. Farley shared with Olivier regarding his father's care progress, i.e., that he would likely remain in his current state and is unlikely to improve, and that he may continue to have periodic seizures. The team has been adjusting the patient's medication to best manage the seizure activity. When explored, Olivier shared his belief of what would be important to his father regarding his present state and continuing care, i.e., being alive is more important over quality of life. Olivier shared that his father would want to continue living with the comfort of knowing that his son is at his bedside. Olivier shared the weight of being his father's surrogate decision maker. Ethics shared that if there are decisions that he may be unsure about, he may defer the decisions to the expertise of the clinical team. Olivier expressed understanding and conveyed the importance to him of remaining his father's surrogate and family's representative.     Discussion with patient, Olivier (patient's son), CATA Haile (Palliative Care Patient and Family Support), ISAAK Fernandez MD (Palliative Care Attending) (13:35-13:45). Palliative Care and Ethics offered to remain available to support Olivier with care decisions. When explored, Olivier was amenable with the plan for patient to be discharged to [facility].     Presently, Olivier (patient's son) remains recognized as the patient's surrogate decision-maker while the patient remains without capacity to participate in care decisions (see full consult note from 10/3/2024). Goals of care are to continue treatment with plan to discharge to [facility].       Lilo Harmon, MSN, RN, PAYAM, [facility]  Ethics Fellow  [phone]  [email or web address]  (available via teams)

## 2025-03-06 NOTE — PROGRESS NOTE ADULT - ATTENDING COMMENTS
Chric respiratory failure s/p trach, vent dependent.  S/p trach change'  Bronchial wash growing stenothrophomonas, would treat for VA tracheitis/pna with bactrim.
Chronic respiratory failure s/p trach, vent dependent.  No indication for decannulation.   Trach well seated, no need for down sizing, however due to exchange.    Moderate amount of secretions noted during trach exam bronchoscopy, sample sent for culture, will follow.  Unable to visualized proximal trachea above trach stoma due to significant laryngeal sensitivity of patient despite sedation.  However trach change was uneventful.    Please, let us know when patient ready for skilled facility transfer to provide outpatient trach care.
Chronic respiratory failure, vent dependent, s/p tracheostomy 3-4 mo ago  Due for trach change  Plan for bronch and VC exam before the change.  No need for downsizing
Increasing FiO2 requirements due to clinical VAP. Should treat Stenotrophomonas, do not think this is a colonizer. Aggressive pulmonary toilet. Rest as per above.
plan as above  will benefit from bed with chest PT function to facilitate airway clearance.
Chart reviewed, Case d/w Dr. Gonsalves in am, pt seen at bedside.  Non oliguric iATN, stage 3 ANIKA. (sCr 3.4->4.8).   Neurosurg goals appreciated: Na 140-150 (157 in am), and -160 (approx 160 midday).  Agree with recs by fellow above.   Renal service will continue to reassess.
Chart reviewed, Case d/w Dr. Gonsalves in am.  Non oliguric iATN, Cr now 4.96.  Neurosurg goals appreciated: Na 140-150 (154 in am), and -160.  This afternoon, SBP ~170s and HR 90s, agree with rec to increase Labetalol to 400mg TID and cont. Amlodipine 10mg, Hydral 25mg TID.  As above, Calculated FWD for Na 145meq is ~3L.  Agree to continue  ml q4h, and admin 1L of D5W @ 100ml/hr.   Renal service will continue to reassess and follow closely with you.  Continue to trend labs and monitor BP and HR.
Pt seemingly slightly more alert and attempting to open mouth to command.  Twitching in the mouth fluctuating, but because he is able to voluntarily attempt, it means there is cortical-facial muscle connection, meaning probably the twitches are seizures, and improving the seizures may allow better control of the face.    Plan:  continue to push up the dosing of PHT, but may be on the sharp upslope of the zero order kinetics, so will increase slowly and check levels as it increases.
Pt with more facial twitching and vertical nystagmus - fluctuating, but perhaps worse and possibly seizure - which would imply cortical-facial nerve intact pathway.  Alternatively may be direct facial nerve/bilateral hemifacial spasm.    Either way, sodium channel blocking may help; already on dilantin, but level low, so will push dose, now 2d and recheck level
Pt with slightly improved twitching of his face/mouth when seen today on rounding.  Following commands fairly briskly.  Closes right hand to command, weakly, some twitching as well.    Dilantin levels still very low and can continue to increase but will need to watch for zero-order kinetics.  Continue increasing as tolerated.
CT chest with BLL consolidations and patchy L side opacities. Recommend check BCx x2, add on diff and LFTs to AM labs, and start bactrim and kelsea as above for treatment of stenotrophomonas PNA.
I agree with above note edited where appropriate
Opens eyes to voice, does not appear to blink purposefully in response to questions or commands.  Continues to have intermittent left eyelid twitching.  I remain concerned that increasing the dose of phenytoin or adding another AED would be overly sedating without necessarily controlling these twitches, so recommend continuing Keppra 1500 mg BID and phenytoin 100 mg TID.
Plan for tracheostomy today which patient tolertaed well. No issues with trach post procedure and ok to suction from pulmonary perspective. wIll continue to monitor for bleeding and remove sutures in 7-14 days.
Pt awake and alert, makes eye contact.  Appears to follow commands with eyes ad attempts mouth  Twitching in the mouth perhaps a bit improved compared to yesterday.  Not appearing to be drowsy/toxic, and dilantin level is still quite low.    Plan:  1) push up dilantin dosing, more at night.
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  Seizures have been difficult to manage, and with better control of the seizures, he is more responsive and able to follow commands and answer simple questions using eyebrows due to being partially locked in.    Neurosurgery attempting to find dispo solution and goals of care with family and will assist with family meetings and discussions.
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    Seen and examined this AM. EEG improved after 3AM no sz captured and nurse and neurology resident felt that patient was more alert this AM and responding to questions with wiggling R toes. Upon my assessment at 11:15 AM, pt was more drowsy and opened eyes to noxious stim only.    Plan for continued VEEG, f/u trough keppra level (Dilantin is therapeutic at 17), uptitrate Vimpat if more seizures on EEG. Consider MRI brain to better elucidate cause for seizure exacerbation. Would check EKG to r/o conduction issues after starting VImpat.
On exam today, does not open eyes to voice, opens eyes to firm touch.  Does not blink or otherwise acknowledge questions or commands.  Continues to have intermittent left face and eyelid twitching consistent with focal motor seizures.  EEG unchanged.  Will stop EEG today as seizures are all clinical without EEG correlate.  Will continue Keppra and phenobarbital at current doses.  PHB level 12.8 corrected.  I would not recommend adding any AEDs or increasing phenobarbital dose at this time as this would likely only sedate him without completely controlling the focal motor seizures.
Spoke with patient's son at the bedside.  Spent time with ethics and palliative to discuss goals of care.  Son wishing to spend his birthday and his father's birthday together, and believes in their culture his family will want him to live.  Life more than quality of life is valued.    Currently medication have reached a level of Seizure control balanced with level of awareness and ability to follow commands, looking more comfortable.
Pt appearing improved compared to prior days - less seizure activity/spasms and following commands with right hand and feet.  Communicating with eyebrow movements.    Plan:  1) will continue lorazepam at 0.5mg qhs x 5 days.  if he continues to do well, can discontinue then.  2) if continues to do well, will d/c vEEG.
Pt with improved function with better seizure control on lacosamide, but continuing to have significant twitching and focal motor status, though still able to follow simple commands with his hands and feet and eyebrows.    Pt has multiple mechanisms of action, but has not yet used gabapentin/pregabalin [id number] receptor modulation.  THis may also help with spasticity and could replace lorazepam, which did not have a significant effect per team who has been managing him last week.    Plan:  1) trial of gabapentin 300mg tid.

## 2025-03-06 NOTE — PROGRESS NOTE ADULT - SUBJECTIVE AND OBJECTIVE BOX
Neurology Progress Note    Interval History:  No acute events overnight. Per nursing team, patient a bit more awake today. No increase in twitching.       PAST MEDICAL & SURGICAL HISTORY:  Acute myocardial infarction    CVA (cerebral vascular accident)      Medications:  acetaminophen   Oral Liquid .. 650 milliGRAM(s) Oral every 6 hours PRN  albuterol/ipratropium for Nebulization 3 milliLiter(s) Nebulizer every 4 hours  artificial tears (preservative free) Ophthalmic Solution 1 Drop(s) Both EYES every 4 hours  chlorhexidine 0.12% Liquid 15 milliLiter(s) Oral Mucosa every 12 hours  chlorhexidine 2% Cloths 1 Application(s) Topical <User Schedule>  doxazosin 4 milliGRAM(s) Oral at bedtime  erythromycin   Ointment 1 Application(s) Both EYES every 4 hours  fluticasone propionate 50 MICROgram(s)/spray Nasal Spray 1 Spray(s) Both Nostrils two times a day  gabapentin 300 milliGRAM(s) Oral every 8 hours  heparin   Injectable 5000 Unit(s) SubCutaneous every 8 hours  influenza   Vaccine 0.5 milliLiter(s) IntraMuscular once  lacosamide IVPB 200 milliGRAM(s) IV Intermittent every 12 hours  levETIRAcetam 1500 milliGRAM(s) Oral every 12 hours  LORazepam     Tablet 0.5 milliGRAM(s) Oral <User Schedule>  LORazepam   Injectable 2 milliGRAM(s) IV Push once PRN  metoprolol tartrate 12.5 milliGRAM(s) Oral every 12 hours  pantoprazole   Suspension 40 milliGRAM(s) Oral daily  petrolatum Ophthalmic Ointment 1 Application(s) Both EYES every 4 hours  phenytoin   Suspension 150 milliGRAM(s) Oral <User Schedule>  phenytoin   Suspension 200 milliGRAM(s) Oral <User Schedule>  polyethylene glycol 3350 17 Gram(s) Oral daily  senna 2 Tablet(s) Oral at bedtime  sodium chloride 0.65% Nasal 1 Spray(s) Both Nostrils two times a day      Vital Signs Last 24 Hrs  T(C): 36.7 (06 Mar 2025 08:56), Max: 36.8 (06 Mar 2025 04:29)  T(F): 98.1 (06 Mar 2025 08:56), Max: 98.3 (06 Mar 2025 04:29)  HR: 74 (06 Mar 2025 08:30) (74 - 90)  BP: 121/89 (06 Mar 2025 08:30) (106/75 - 122/88)  BP(mean): 99 (06 Mar 2025 08:30) (86 - 100)  RR: 14 (06 Mar 2025 08:30) (14 - 23)  SpO2: 100% (06 Mar 2025 08:30) (94% - 100%)    Parameters below as of 06 Mar 2025 08:30  Patient On (Oxygen Delivery Method): ventilator    O2 Concentration (%): 40    Neurological Exam:   Mental status: awake, can follow some simple commands - squeezing finger on right and wiggling right toes.  Eyes: Left eye conjunctival injection. Right eye vertical nystagmus. Pupils are equal and reactive to light,  Unable to track, blinks to threat  Face: R lower rhythmic synchronous facial twitching present  Motor: Flaccid tone in both upper extremities and lower extremities (0/5). Right hand able to squeeze fingers lightly (1/5). No abnormal movement in lower ext.  Sensation: Localizes to painful stimuli in LUE and b/l LE, hard to asses in RUE given persistent twitching/spasms  Coordination: Unable to asses   Reflexes: mute babinski. Hyperreflexive in RLE 4+/5, 3+ in all other areas. Toes mute  Cerebellar signs unable to assess.   Gait: Deferred    Labs:  CBC Full  -  ( 06 Mar 2025 07:02 )  WBC Count : 7.28 K/uL  RBC Count : 3.31 M/uL  Hemoglobin : 10.5 g/dL  Hematocrit : 32.0 %  Platelet Count - Automated : 238 K/uL  Mean Cell Volume : 96.7 fl  Mean Cell Hemoglobin : 31.7 pg  Mean Cell Hemoglobin Concentration : 32.8 g/dL  Auto Neutrophil # : x  Auto Lymphocyte # : x  Auto Monocyte # : x  Auto Eosinophil # : x  Auto Basophil # : x  Auto Neutrophil % : x  Auto Lymphocyte % : x  Auto Monocyte % : x  Auto Eosinophil % : x  Auto Basophil % : x    03-06    134[L]  |  97  |  57[H]  ----------------------------<  120[H]  4.4   |  24  |  1.21    Ca    9.3      06 Mar 2025 07:02  Phos  4.3     03-06  Mg     2.4     03-06          Urinalysis Basic - ( 06 Mar 2025 07:02 )    Color: x / Appearance: x / SG: x / pH: x  Gluc: 120 mg/dL / Ketone: x  / Bili: x / Urobili: x   Blood: x / Protein: x / Nitrite: x   Leuk Esterase: x / RBC: x / WBC x   Sq Epi: x / Non Sq Epi: x / Bacteria: x

## 2025-03-06 NOTE — PROGRESS NOTE ADULT - ASSESSMENT
44 year-old-male with unclear PMH (?stroke, MI) who was found down at work, intubated for airway protection and found to have acute large parenchymal hemorrhage within nabil with mass effect (+ acute/subacute right cerebellar infarct) in setting of hypertensive emergency, and R cerebellar stroke transferred to [facility] for further neurosurgical care. Epilepsy called back on 3/1 for concern of seizure contributing to worsening mental status as repeat CTH was negative. Previous vEEGs were notable for right quadrant anterior seizures and epileptiform potential. Patient has long standing intermittent facial twitching at times not correlated to seizures as per chart review.  Found to have multiple seizures on recent EEGs requiring titration of meds.   Today on exam, he is awake and can follow some simple commands however has persistent right rhythmic lower facial twitching. Mental status improved compared to yesterday and twitching appears otherwise stable    Recommendations:   44 year-old-male with unclear PMH (?stroke, MI) who was found down at work, intubated for airway protection and found to have acute large parenchymal hemorrhage within nabil with mass effect (+ acute/subacute right cerebellar infarct) in setting of hypertensive emergency, and R cerebellar stroke transferred to [facility] for further neurosurgical care. Epilepsy called back on 3/1 for concern of seizure contributing to worsening mental status as repeat CTH was negative. Previous vEEGs were notable for right quadrant anterior seizures and epileptiform potential. Patient has long standing intermittent facial twitching at times not correlated to seizures as per chart review.  Found to have multiple seizures on recent EEGs requiring titration of meds.   Today on exam, he is awake and can follow some simple commands however has persistent right rhythmic lower facial twitching. Mental status improved compared to yesterday and twitching appears otherwise stable    Recommendations:  - d/c VEEG  - c/w ativan at 0.5mg qHS for 5 days to avoid withdrawal  - Continue gabapentin 300 mg q8h  - Continue Vimpat 200mg BID  - Continue Keppra 1500mg Q12hrs   - Continue Phenytoin to 150/150/200mg   - f/u keppra levels (drawn on 3/2)  - Maintain seizure/fall/aspiration precautions  - Rest of the management per primary team     Case discussed with Dr. Farley 44 year-old-male with unclear PMH (?stroke, MI) who was found down at work, intubated for airway protection and found to have acute large parenchymal hemorrhage within nabil with mass effect (+ acute/subacute right cerebellar infarct) in setting of hypertensive emergency, and R cerebellar stroke transferred to [facility] for further neurosurgical care. Epilepsy called back on 3/1 for concern of seizure contributing to worsening mental status as repeat CTH was negative. Previous vEEGs were notable for right quadrant anterior seizures and epileptiform potential. Patient has long standing intermittent facial twitching at times not correlated to seizures as per chart review.  Found to have multiple seizures on recent EEGs requiring titration of meds.   Today on exam, he is awake and can follow some simple commands however has persistent right rhythmic lower facial twitching. Mental status improved compared to yesterday and twitching appears otherwise stable    Recommendations:  - d/c VEEG  - c/w ativan at 0.5mg qHS for 5 days to avoid withdrawal, but could also consider continuing indefinitely  - Continue gabapentin 300 mg q8h  - Continue Vimpat 200mg BID  - Continue Keppra 1500mg Q12hrs   - Continue Phenytoin to 150/150/200mg   - f/u keppra levels (drawn on 3/2)  - Maintain seizure/fall/aspiration precautions  - Rest of the management per primary team     Case discussed with Dr. Farley

## 2025-03-06 NOTE — PROGRESS NOTE ADULT - SUBJECTIVE AND OBJECTIVE BOX
SUBJECTIVE:  Patient was seen and examined at bedside. Patient's son present at the bedside. Patient awake, looking comfortable, no labored breathing on Vent. No twitching noted. No other complaints or events reported.    Review of systems: unable to obtain     Diet, NPO with Tube Feed:   Tube Feeding Modality: Gastrostomy  Maribeth Sauceda Renal  Total Volume for 24 Hours (mL): 1062  Total Number of Cans: 5  Continuous  Starting Tube Feed Rate mL per Hour: 59  Increase Tube Feed Rate by (mL): 0     Every 4 hours  Until Goal Tube Feed Rate (mL per Hour): 59  Tube Feed Duration (in Hours): 18  Tube Feed Start Time: 11:00  Tube Feed Stop Time: 05:00 (02-20-25 @ 10:42) [Active]      MEDICATIONS:  MEDICATIONS  (STANDING):  albuterol/ipratropium for Nebulization 3 milliLiter(s) Nebulizer every 4 hours  artificial tears (preservative free) Ophthalmic Solution 1 Drop(s) Both EYES every 4 hours  chlorhexidine 0.12% Liquid 15 milliLiter(s) Oral Mucosa every 12 hours  chlorhexidine 2% Cloths 1 Application(s) Topical <User Schedule>  doxazosin 4 milliGRAM(s) Oral at bedtime  erythromycin   Ointment 1 Application(s) Both EYES every 4 hours  fluticasone propionate 50 MICROgram(s)/spray Nasal Spray 1 Spray(s) Both Nostrils two times a day  gabapentin 300 milliGRAM(s) Oral every 8 hours  heparin   Injectable 5000 Unit(s) SubCutaneous every 8 hours  influenza   Vaccine 0.5 milliLiter(s) IntraMuscular once  lacosamide IVPB 200 milliGRAM(s) IV Intermittent every 12 hours  levETIRAcetam 1500 milliGRAM(s) Oral every 12 hours  LORazepam     Tablet 0.5 milliGRAM(s) Oral <User Schedule>  metoprolol tartrate 12.5 milliGRAM(s) Oral every 12 hours  pantoprazole   Suspension 40 milliGRAM(s) Oral daily  petrolatum Ophthalmic Ointment 1 Application(s) Both EYES every 4 hours  phenytoin   Suspension 150 milliGRAM(s) Oral <User Schedule>  phenytoin   Suspension 200 milliGRAM(s) Oral <User Schedule>  polyethylene glycol 3350 17 Gram(s) Oral daily  senna 2 Tablet(s) Oral at bedtime  sodium chloride 0.65% Nasal 1 Spray(s) Both Nostrils two times a day    MEDICATIONS  (PRN):  acetaminophen   Oral Liquid .. 650 milliGRAM(s) Oral every 6 hours PRN Temp greater or equal to 38C (100.4F), Mild Pain (1 - 3)  LORazepam   Injectable 2 milliGRAM(s) IV Push once PRN Seizure Activity (NOTIFY PROVIDER PRIOR TO GIVING)      Allergies    Allergy Status Unknown    Intolerances        OBJECTIVE:  Vital Signs Last 24 Hrs  T(C): 36.7 (06 Mar 2025 08:56), Max: 36.8 (06 Mar 2025 04:29)  T(F): 98.1 (06 Mar 2025 08:56), Max: 98.3 (06 Mar 2025 04:29)  HR: 76 (06 Mar 2025 08:55) (74 - 90)  BP: 121/89 (06 Mar 2025 08:30) (106/75 - 122/88)  BP(mean): 99 (06 Mar 2025 08:30) (86 - 100)  RR: 14 (06 Mar 2025 08:55) (14 - 23)  SpO2: 100% (06 Mar 2025 08:55) (94% - 100%)    Parameters below as of 06 Mar 2025 08:55  Patient On (Oxygen Delivery Method): ventilator    O2 Concentration (%): 40  I&O's Summary    05 Mar 2025 07:01  -  06 Mar 2025 07:00  --------------------------------------------------------  IN: 3224 mL / OUT: 1500 mL / NET: 1724 mL        PHYSICAL EXAM:  General: awake, looking comfortable, no labored breathing on Vent   HEENT: EEG leads in place, trach clean   Lungs: limited anterior, no crackles, no wheezes  Abdomen: PEG, soft, no distension, no visible tenderness, + BS  Extremities: warm, no edema, no visible tenderness     LABS:                        10.5   7.28  )-----------( 238      ( 06 Mar 2025 07:02 )             32.0     03-06    134[L]  |  97  |  57[H]  ----------------------------<  120[H]  4.4   |  24  |  1.21    Ca    9.3      06 Mar 2025 07:02  Phos  4.3     03-06  Mg     2.4     03-06          CAPILLARY BLOOD GLUCOSE        Urinalysis Basic - ( 06 Mar 2025 07:02 )    Color: x / Appearance: x / SG: x / pH: x  Gluc: 120 mg/dL / Ketone: x  / Bili: x / Urobili: x   Blood: x / Protein: x / Nitrite: x   Leuk Esterase: x / RBC: x / WBC x   Sq Epi: x / Non Sq Epi: x / Bacteria: x        MICRODATA:      RADIOLOGY/OTHER STUDIES:

## 2025-03-06 NOTE — PROGRESS NOTE ADULT - ASSESSMENT
45yo M w/ reported PMHx of MI and previous strokes, who was admitted to [facility] on 09/30/2024 for AMS, found to have acute large parenchymal hemorrhage within nabil with mass effect (+ acute/subacute right cerebellar infarct) in setting of hypertensive emergency, and R cerebellar stroke , c/b poor neurologic recovery, now trach/vent dependent/PEG, with recurrent fever/infections and ICU readmissions a/w focal status epilepticus. 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    PPS 10%. Prognosis is poor.  Patient is at high risk of death and complications.  Clinically stable; seizure episodes being controlled, as per epilepsy team.  GOC are to prolong life.  FULL CODE.

## 2025-03-07 DIAGNOSIS — R52 PAIN, UNSPECIFIED: ICD-10-CM

## 2025-03-07 DIAGNOSIS — Z91.89 OTHER SPECIFIED PERSONAL RISK FACTORS, NOT ELSEWHERE CLASSIFIED: ICD-10-CM

## 2025-03-07 LAB
ANION GAP SERPL CALC-SCNC: 13 MMOL/L — SIGNIFICANT CHANGE UP (ref 5–17)
BUN SERPL-MCNC: 55 MG/DL — HIGH (ref 7–23)
CALCIUM SERPL-MCNC: 9.6 MG/DL — SIGNIFICANT CHANGE UP (ref 8.4–10.5)
CHLORIDE SERPL-SCNC: 103 MMOL/L — SIGNIFICANT CHANGE UP (ref 96–108)
CO2 SERPL-SCNC: 22 MMOL/L — SIGNIFICANT CHANGE UP (ref 22–31)
CREAT SERPL-MCNC: 1.19 MG/DL — SIGNIFICANT CHANGE UP (ref 0.5–1.3)
EGFR: 76 ML/MIN/1.73M2 — SIGNIFICANT CHANGE UP
EGFR: 76 ML/MIN/1.73M2 — SIGNIFICANT CHANGE UP
GLUCOSE SERPL-MCNC: 122 MG/DL — HIGH (ref 70–99)
HCT VFR BLD CALC: 35.6 % — LOW (ref 39–50)
HGB BLD-MCNC: 11.4 G/DL — LOW (ref 13–17)
MAGNESIUM SERPL-MCNC: 2.3 MG/DL — SIGNIFICANT CHANGE UP (ref 1.6–2.6)
MCHC RBC-ENTMCNC: 30.7 PG — SIGNIFICANT CHANGE UP (ref 27–34)
MCHC RBC-ENTMCNC: 32 G/DL — SIGNIFICANT CHANGE UP (ref 32–36)
MCV RBC AUTO: 96 FL — SIGNIFICANT CHANGE UP (ref 80–100)
NRBC BLD AUTO-RTO: 0 /100 WBCS — SIGNIFICANT CHANGE UP (ref 0–0)
PHOSPHATE SERPL-MCNC: 4.4 MG/DL — SIGNIFICANT CHANGE UP (ref 2.5–4.5)
PLATELET # BLD AUTO: 253 K/UL — SIGNIFICANT CHANGE UP (ref 150–400)
POTASSIUM SERPL-MCNC: 5.1 MMOL/L — SIGNIFICANT CHANGE UP (ref 3.5–5.3)
POTASSIUM SERPL-SCNC: 5.1 MMOL/L — SIGNIFICANT CHANGE UP (ref 3.5–5.3)
RBC # BLD: 3.71 M/UL — LOW (ref 4.2–5.8)
RBC # FLD: 12.5 % — SIGNIFICANT CHANGE UP (ref 10.3–14.5)
SODIUM SERPL-SCNC: 138 MMOL/L — SIGNIFICANT CHANGE UP (ref 135–145)
WBC # BLD: 7.41 K/UL — SIGNIFICANT CHANGE UP (ref 3.8–10.5)
WBC # FLD AUTO: 7.41 K/UL — SIGNIFICANT CHANGE UP (ref 3.8–10.5)

## 2025-03-07 PROCEDURE — 99233 SBSQ HOSP IP/OBS HIGH 50: CPT

## 2025-03-07 PROCEDURE — 36000 PLACE NEEDLE IN VEIN: CPT

## 2025-03-07 PROCEDURE — 99232 SBSQ HOSP IP/OBS MODERATE 35: CPT

## 2025-03-07 RX ORDER — LACOSAMIDE 150 MG/1
200 TABLET, FILM COATED ORAL
Refills: 0 | Status: DISCONTINUED | OUTPATIENT
Start: 2025-03-07 | End: 2025-03-07

## 2025-03-07 RX ORDER — LACOSAMIDE 150 MG/1
200 TABLET, FILM COATED ORAL EVERY 12 HOURS
Refills: 0 | Status: DISCONTINUED | OUTPATIENT
Start: 2025-03-07 | End: 2025-03-13

## 2025-03-07 RX ADMIN — Medication 150 MILLIGRAM(S): at 11:21

## 2025-03-07 RX ADMIN — FLUTICASONE PROPIONATE 1 SPRAY(S): 50 SPRAY, METERED NASAL at 05:18

## 2025-03-07 RX ADMIN — Medication 1 APPLICATION(S): at 17:49

## 2025-03-07 RX ADMIN — GABAPENTIN 300 MILLIGRAM(S): 400 CAPSULE ORAL at 13:53

## 2025-03-07 RX ADMIN — ERYTHROMYCIN 1 APPLICATION(S): 5 OINTMENT OPHTHALMIC at 09:14

## 2025-03-07 RX ADMIN — HEPARIN SODIUM 5000 UNIT(S): 1000 INJECTION INTRAVENOUS; SUBCUTANEOUS at 13:53

## 2025-03-07 RX ADMIN — Medication 1 DROP(S): at 21:07

## 2025-03-07 RX ADMIN — METOPROLOL SUCCINATE 12.5 MILLIGRAM(S): 50 TABLET, EXTENDED RELEASE ORAL at 05:17

## 2025-03-07 RX ADMIN — POLYETHYLENE GLYCOL 3350 17 GRAM(S): 17 POWDER, FOR SOLUTION ORAL at 11:21

## 2025-03-07 RX ADMIN — IPRATROPIUM BROMIDE AND ALBUTEROL SULFATE 3 MILLILITER(S): .5; 2.5 SOLUTION RESPIRATORY (INHALATION) at 01:15

## 2025-03-07 RX ADMIN — Medication 1 APPLICATION(S): at 21:07

## 2025-03-07 RX ADMIN — FLUTICASONE PROPIONATE 1 SPRAY(S): 50 SPRAY, METERED NASAL at 17:49

## 2025-03-07 RX ADMIN — Medication 2 TABLET(S): at 21:06

## 2025-03-07 RX ADMIN — ERYTHROMYCIN 1 APPLICATION(S): 5 OINTMENT OPHTHALMIC at 13:54

## 2025-03-07 RX ADMIN — Medication 1 SPRAY(S): at 17:49

## 2025-03-07 RX ADMIN — HEPARIN SODIUM 5000 UNIT(S): 1000 INJECTION INTRAVENOUS; SUBCUTANEOUS at 05:17

## 2025-03-07 RX ADMIN — IPRATROPIUM BROMIDE AND ALBUTEROL SULFATE 3 MILLILITER(S): .5; 2.5 SOLUTION RESPIRATORY (INHALATION) at 21:05

## 2025-03-07 RX ADMIN — LEVETIRACETAM 1500 MILLIGRAM(S): 10 INJECTION, SOLUTION INTRAVENOUS at 05:17

## 2025-03-07 RX ADMIN — GABAPENTIN 300 MILLIGRAM(S): 400 CAPSULE ORAL at 21:06

## 2025-03-07 RX ADMIN — Medication 1 APPLICATION(S): at 05:18

## 2025-03-07 RX ADMIN — HEPARIN SODIUM 5000 UNIT(S): 1000 INJECTION INTRAVENOUS; SUBCUTANEOUS at 21:06

## 2025-03-07 RX ADMIN — IPRATROPIUM BROMIDE AND ALBUTEROL SULFATE 3 MILLILITER(S): .5; 2.5 SOLUTION RESPIRATORY (INHALATION) at 17:51

## 2025-03-07 RX ADMIN — Medication 15 MILLILITER(S): at 05:17

## 2025-03-07 RX ADMIN — Medication 150 MILLIGRAM(S): at 05:16

## 2025-03-07 RX ADMIN — Medication 1 APPLICATION(S): at 13:53

## 2025-03-07 RX ADMIN — Medication 1 DROP(S): at 05:18

## 2025-03-07 RX ADMIN — Medication 40 MILLIGRAM(S): at 11:21

## 2025-03-07 RX ADMIN — IPRATROPIUM BROMIDE AND ALBUTEROL SULFATE 3 MILLILITER(S): .5; 2.5 SOLUTION RESPIRATORY (INHALATION) at 13:53

## 2025-03-07 RX ADMIN — ERYTHROMYCIN 1 APPLICATION(S): 5 OINTMENT OPHTHALMIC at 01:15

## 2025-03-07 RX ADMIN — Medication 1 APPLICATION(S): at 01:15

## 2025-03-07 RX ADMIN — ERYTHROMYCIN 1 APPLICATION(S): 5 OINTMENT OPHTHALMIC at 05:19

## 2025-03-07 RX ADMIN — IPRATROPIUM BROMIDE AND ALBUTEROL SULFATE 3 MILLILITER(S): .5; 2.5 SOLUTION RESPIRATORY (INHALATION) at 09:14

## 2025-03-07 RX ADMIN — Medication 1 SPRAY(S): at 05:18

## 2025-03-07 RX ADMIN — METOPROLOL SUCCINATE 12.5 MILLIGRAM(S): 50 TABLET, EXTENDED RELEASE ORAL at 17:52

## 2025-03-07 RX ADMIN — Medication 1 DROP(S): at 01:15

## 2025-03-07 RX ADMIN — IPRATROPIUM BROMIDE AND ALBUTEROL SULFATE 3 MILLILITER(S): .5; 2.5 SOLUTION RESPIRATORY (INHALATION) at 05:17

## 2025-03-07 RX ADMIN — ERYTHROMYCIN 1 APPLICATION(S): 5 OINTMENT OPHTHALMIC at 17:50

## 2025-03-07 RX ADMIN — Medication 1 DROP(S): at 13:53

## 2025-03-07 RX ADMIN — LACOSAMIDE 200 MILLIGRAM(S): 150 TABLET, FILM COATED ORAL at 12:54

## 2025-03-07 RX ADMIN — DOXAZOSIN MESYLATE 4 MILLIGRAM(S): 8 TABLET ORAL at 21:06

## 2025-03-07 RX ADMIN — GABAPENTIN 300 MILLIGRAM(S): 400 CAPSULE ORAL at 05:17

## 2025-03-07 RX ADMIN — Medication 0.5 MILLIGRAM(S): at 21:06

## 2025-03-07 RX ADMIN — Medication 200 MILLIGRAM(S): at 19:13

## 2025-03-07 RX ADMIN — ERYTHROMYCIN 1 APPLICATION(S): 5 OINTMENT OPHTHALMIC at 21:06

## 2025-03-07 RX ADMIN — Medication 1 DROP(S): at 09:14

## 2025-03-07 RX ADMIN — LEVETIRACETAM 1500 MILLIGRAM(S): 10 INJECTION, SOLUTION INTRAVENOUS at 17:50

## 2025-03-07 RX ADMIN — Medication 1 DROP(S): at 17:50

## 2025-03-07 RX ADMIN — Medication 1 APPLICATION(S): at 05:19

## 2025-03-07 RX ADMIN — Medication 1 APPLICATION(S): at 09:14

## 2025-03-07 RX ADMIN — Medication 15 MILLILITER(S): at 17:49

## 2025-03-07 RX ADMIN — LACOSAMIDE 200 MILLIGRAM(S): 150 TABLET, FILM COATED ORAL at 17:50

## 2025-03-07 NOTE — PROCEDURE NOTE - NSPROCNAME_GEN_A_CORE
Bronchoscopy
Arterial Puncture/Cannulation
General
Point of Care Ultrasound Vascular Access
Arterial Puncture/Cannulation
Bronchoscopy
Arterial Puncture/Cannulation
Feeding Tube Replacement
Bronchoscopy
Peripheral Line Insertion
Arterial Puncture/Cannulation
Arterial Puncture/Cannulation
Feeding Tube Replacement
Point of Care Ultrasound Lung
Arterial Puncture/Cannulation

## 2025-03-07 NOTE — PROGRESS NOTE ADULT - SUBJECTIVE AND OBJECTIVE BOX
Patient was seen and examined at bedside. Case discuss with the primary team. Pt without any events overnight.     OBJECTIVE:  Vital Signs Last 24 Hrs  T(C): 36.7 (07 Mar 2025 09:00), Max: 36.8 (07 Mar 2025 04:52)  T(F): 98 (07 Mar 2025 09:00), Max: 98.2 (07 Mar 2025 04:52)  HR: 78 (07 Mar 2025 08:30) (74 - 86)  BP: 131/92 (07 Mar 2025 08:00) (111/74 - 131/92)  BP(mean): 107 (07 Mar 2025 08:00) (89 - 107)  RR: 16 (07 Mar 2025 08:00) (14 - 17)  SpO2: 100% (07 Mar 2025 08:30) (94% - 100%)    Parameters below as of 07 Mar 2025 08:00  Patient On (Oxygen Delivery Method): ventilator    O2 Concentration (%): 40      PHYSICAL EXAM:  General: awake, looking comfortable, no labored breathing on Vent   HHENT: some facial twitching   Lungs: limited anterior evaluation;  no crackles, no wheezes  Abdomen: PEG, soft, no distension, no visible tenderness, + BS  Extremities: warm, no edema, no visible tenderness     LABS:                        11.4   7.41  )-----------( 253      ( 07 Mar 2025 07:12 )             35.6       138  |  103  |  55[H]  ----------------------------<  122[H]  5.1   |  22  |  1.19    Ca    9.6      07 Mar 2025 07:12  Phos  4.4     03-07  Mg     2.3     03-07      acetaminophen   Oral Liquid .. 650 milliGRAM(s) Oral every 6 hours PRN  albuterol/ipratropium for Nebulization 3 milliLiter(s) Nebulizer every 4 hours  artificial tears (preservative free) Ophthalmic Solution 1 Drop(s) Both EYES every 4 hours  chlorhexidine 0.12% Liquid 15 milliLiter(s) Oral Mucosa every 12 hours  chlorhexidine 2% Cloths 1 Application(s) Topical <User Schedule>  doxazosin 4 milliGRAM(s) Oral at bedtime  erythromycin   Ointment 1 Application(s) Both EYES every 4 hours  fluticasone propionate 50 MICROgram(s)/spray Nasal Spray 1 Spray(s) Both Nostrils two times a day  gabapentin 300 milliGRAM(s) Oral every 8 hours  heparin   Injectable 5000 Unit(s) SubCutaneous every 8 hours  influenza   Vaccine 0.5 milliLiter(s) IntraMuscular once  levETIRAcetam 1500 milliGRAM(s) Oral every 12 hours  LORazepam     Tablet 0.5 milliGRAM(s) Oral <User Schedule>  LORazepam   Injectable 2 milliGRAM(s) IV Push once PRN  metoprolol tartrate 12.5 milliGRAM(s) Oral every 12 hours  pantoprazole   Suspension 40 milliGRAM(s) Oral daily  petrolatum Ophthalmic Ointment 1 Application(s) Both EYES every 4 hours  phenytoin   Suspension 150 milliGRAM(s) Oral <User Schedule>  phenytoin   Suspension 200 milliGRAM(s) Oral <User Schedule>  polyethylene glycol 3350 17 Gram(s) Oral daily  senna 2 Tablet(s) Oral at bedtime  sodium chloride 0.65% Nasal 1 Spray(s) Both Nostrils two times a day

## 2025-03-07 NOTE — PROGRESS NOTE ADULT - SUBJECTIVE AND OBJECTIVE BOX
HPI:  Unknown age male, unknown past medical history (reported stroke and MI by coworkers) presented to [facility] with AMS, Pt was working at [employer] when he was found down by coworkers. EMS called and pt brought to [facility] ED. Intubated, sedated, started on cardene for SBPs in 200s. CT head showed brain stem bleed. Transferred to NSICU for further management.  (30 Sep 2024 12:55)    Subjective: Unable to assess subjective data 2/2 pt's neurologic status    Vital Signs Last 24 Hrs  T(C): 36.6 (06 Mar 2025 21:52), Max: 36.8 (06 Mar 2025 04:29)  T(F): 97.9 (06 Mar 2025 21:52), Max: 98.3 (06 Mar 2025 04:29)  HR: 78 (06 Mar 2025 23:30) (74 - 82)  BP: 111/74 (06 Mar 2025 23:30) (111/74 - 124/80)  BP(mean): 89 (06 Mar 2025 23:30) (89 - 99)  RR: 17 (06 Mar 2025 23:30) (14 - 23)  SpO2: 96% (06 Mar 2025 23:30) (94% - 100%)    Parameters below as of 06 Mar 2025 23:30  Patient On (Oxygen Delivery Method): ventilator    O2 Concentration (%): 40    I&O's Summary    05 Mar 2025 07:01  -  06 Mar 2025 07:00  --------------------------------------------------------  IN: 3224 mL / OUT: 1500 mL / NET: 1724 mL    06 Mar 2025 07:01  -  07 Mar 2025 00:16  --------------------------------------------------------  IN: 1986 mL / OUT: 700 mL / NET: 1286 mL        PHYSICAL EXAM:  Constitutional: NAD, on eeg  Respiratory: breathing non-labored, symmetrical chest wall movement, trache to full vent  Cardiovascuar: RRR  Gastrointestinal: abdomen soft, non tender, peg in place  Genitourinary: exam deffered  Neurological:  AAOX0. Opens eyes to deep stimulus. PERRL. Continuous facial twitching.   Face symmetric  Motor: Right hand  2/5. Right upper 0/5 otherwise. BL LE withdraws to noxious. LUE 0/5  Sensation: unable to be tested  Pronator Drift: unable to be tested  Dysmetria: unable to be tested    LABS:                        10.5   7.28  )-----------( 238      ( 06 Mar 2025 07:02 )             32.0     03-06    134[L]  |  97  |  57[H]  ----------------------------<  120[H]  4.4   |  24  |  1.21    Ca    9.3      06 Mar 2025 07:02  Phos  4.3     03-06  Mg     2.4     03-06        Urinalysis Basic - ( 06 Mar 2025 07:02 )    Color: x / Appearance: x / SG: x / pH: x  Gluc: 120 mg/dL / Ketone: x  / Bili: x / Urobili: x   Blood: x / Protein: x / Nitrite: x   Leuk Esterase: x / RBC: x / WBC x   Sq Epi: x / Non Sq Epi: x / Bacteria: x          CAPILLARY BLOOD GLUCOSE          Drug Levels: [] N/A  Phenytoin Level, Serum: 17.3 ug/mL (03-01 @ 19:25)    CSF Analysis: [] N/A      Allergies    Allergy Status Unknown    Intolerances      MEDICATIONS:  Antibiotics:    Neuro:  acetaminophen   Oral Liquid .. 650 milliGRAM(s) Oral every 6 hours PRN  gabapentin 300 milliGRAM(s) Oral every 8 hours  lacosamide IVPB 200 milliGRAM(s) IV Intermittent every 12 hours  levETIRAcetam 1500 milliGRAM(s) Oral every 12 hours  LORazepam     Tablet 0.5 milliGRAM(s) Oral <User Schedule>  LORazepam   Injectable 2 milliGRAM(s) IV Push once PRN  phenytoin   Suspension 150 milliGRAM(s) Oral <User Schedule>  phenytoin   Suspension 200 milliGRAM(s) Oral <User Schedule>    Anticoagulation:  heparin   Injectable 5000 Unit(s) SubCutaneous every 8 hours    OTHER:  albuterol/ipratropium for Nebulization 3 milliLiter(s) Nebulizer every 4 hours  artificial tears (preservative free) Ophthalmic Solution 1 Drop(s) Both EYES every 4 hours  chlorhexidine 0.12% Liquid 15 milliLiter(s) Oral Mucosa every 12 hours  chlorhexidine 2% Cloths 1 Application(s) Topical <User Schedule>  doxazosin 4 milliGRAM(s) Oral at bedtime  erythromycin   Ointment 1 Application(s) Both EYES every 4 hours  fluticasone propionate 50 MICROgram(s)/spray Nasal Spray 1 Spray(s) Both Nostrils two times a day  influenza   Vaccine 0.5 milliLiter(s) IntraMuscular once  metoprolol tartrate 12.5 milliGRAM(s) Oral every 12 hours  pantoprazole   Suspension 40 milliGRAM(s) Oral daily  petrolatum Ophthalmic Ointment 1 Application(s) Both EYES every 4 hours  polyethylene glycol 3350 17 Gram(s) Oral daily  senna 2 Tablet(s) Oral at bedtime  sodium chloride 0.65% Nasal 1 Spray(s) Both Nostrils two times a day    IVF:    CULTURES:    RADIOLOGY & ADDITIONAL TESTS:      ASSESSMENT:  44M PMH ?stroke/MI present to [facility] after collapsing at work. Decorticate posturing, vomiting, intubated for airway protection. 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 Transferred to [facility] for further management. s/p trach 10/14. s/p peg 10/21. Re-upgrade to ICU 2/2 desaturation event and suctioning requirements 11/15. Re-upgrade to NSICU 12/15 2/2 desaturation and tachycardia.    Neuro:  - neuro/vitals q4h  - pain control: tylenol prn  - seizure tx: keppra 1500mg BID, phenytoin 150/150/200, vimpat IV  200mg BID, gabapentin 300 mg q8, ativan 0.5mg at bedtime (3/5-3/9)   - s/p vEEG (3/1- 3/6), s/p vEEG: +seizure on 2/17, 3/1  - CTH 9/30: enlarged pontine hemorrhage, CTH 10/3: stable, CTH 10/25: mostly resolved pontine hemorrhage, CTH 12/15: R mastoid air cell opacification; acute otitis media vs sterile effusion, CTH 12/18: stable. CTH 1/21 stable, CTH 1/27 stable, CTH 3/1: stable   - MRI brain 10/12: parenchymal hemorrhage, acute/subacute R cerebellar stroke      CV:  - -160  - HTN/tachycardia: Metoprolol 12.5 mg BID   - echo (9/30) EF 75%, repeat 12/16: 57%  - AZ 3/3: 172    PULM:  - s/p trach exchange with pulm at bedside 2/14 to vent, AC/VC 40/400/14/6  - Secretions: duonebs/chest PT q4h  - CT chest 2/17: Near complete collapse b/l lower lobes. Patchy opacity within LLL/ALONDRA  - pulmonology consulted, recs appreciated    GI:  - TFs via PEG, [facility]  - bowel regimen, last BM 3/1    Renal:  - IVL; FW 200q6 for hydration  - Voiding, SC prn  - CKD: trend BUN/Cr  - renal US 10/1, 10/8, 1/15: Increased bilateral renal echogenicity consistent with medical renal disease  - urinary retention: doxazosin 4mg at bedtime     Endo:  - A1c 5.4    Heme:  - DVT ppx: SCDs, SQH 5000u q8h   - LE dopplers negative 12/16    ID:  - Afebrile, last pancx 2/17  - sputum 2/16 +Enterobacter cloacae: s/p Ertapenem (2/19-23) per ID recs, conolonized with stenotrophomonas  - empiric PNA: cefepime (12/22-12/30), s/p vanc (12/22-12/23), s/p zosyn (12/15-12/20), s/p vanc (12/15-12/16), s/p zosyn (1/12 -1/18), s/p DS bactrim 2 tabs TID, 200 mg minocycline BID (2/18)  - Hx +klebsiella UTI & bacteremia, +stenotrophomopnas maltophilia PNA, +Enterobacter cloacae PNA     MISC:  - BL keratitis: BL erythromycin ointment, artificial tears, lacrilube q4, moisture chamber   - Penile wound: wound care rec barrier wipes     Dispo: SDU status, DNR, pending SNF    D/w Dr. D'Amico

## 2025-03-07 NOTE — PROGRESS NOTE ADULT - ASSESSMENT
45yo M w/ reported PMHx of MI and previous strokes, who was admitted to [facility] on 09/30/2024 for AMS, found to have acute large parenchymal hemorrhage within nabil with mass effect (+ acute/subacute right cerebellar infarct) in setting of hypertensive emergency, and R cerebellar stroke , c/b poor neurologic recovery, now trach/vent dependent/PEG, with recurrent fever/infections and ICU readmissions a/w focal status epilepticus. 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    PPS 10%. Prognosis is poor.  Patient is at high risk of death and complications.  Clinically stable; seizure episodes being controlled, as per epilepsy team.  GOC are to prolong life.  FULL CODE.

## 2025-03-07 NOTE — PROCEDURE NOTE - NSINFORMCONSENT_GEN_A_CORE
Benefits, risks, and possible complications of procedure explained to patient/caregiver who verbalized understanding and gave verbal consent.
Benefits, risks, and possible complications of procedure explained to patient/caregiver who verbalized understanding and gave verbal consent.
This was an emergent procedure.
Benefits, risks, and possible complications of procedure explained to patient/caregiver who verbalized understanding and gave verbal consent.
Benefits, risks, and possible complications of procedure explained to patient/caregiver who verbalized understanding and gave verbal consent.
Benefits, risks, and possible complications of procedure explained to patient/caregiver who verbalized understanding and gave written consent.
Benefits, risks, and possible complications of procedure explained to patient/caregiver who verbalized understanding and gave written consent.
2PC consent/This was an emergent procedure.

## 2025-03-07 NOTE — PROGRESS NOTE ADULT - PROBLEM SELECTOR PLAN 1
.  -PPS 10%  -frequent repositioning and skin care  -patient at risk for decubitus ulcers  -supportive care    - patient w/ limited ability of communicating due to functional quadriplegia and waxing and waning mental status. He is able to answer yes or no questions intermittently via small movements of his R hand. I was not able to elicit those movements today.

## 2025-03-07 NOTE — PROGRESS NOTE ADULT - SUBJECTIVE AND OBJECTIVE BOX
[facility] Geriatrics and Palliative Care  Roger Fernandez, Geriatrics & Palliative Medicine attending  Contact Info: Call [phone] (HEAL Line) or message on [employer] Teams    SUBJECTIVE AND OBJECTIVE:  I was asked to see this patient today for a concern of pain during care and passive leg movements.  The patient was seen in his room. No family was at bedside.  The patient was sleeping during my exam. I attempted to wake him up, but he did not show any signs of purposeful movement.  I was not able to assess/ask if he is experiencing pain or not.    INTERVAL HPI/OVERNIGHT EVENTS: Interval events noted. See patient's PRN use for the past 24hrs noted below.     ALLERGIES:  Allergy Status Unknown    MEDICATIONS  (STANDING):  albuterol/ipratropium for Nebulization 3 milliLiter(s) Nebulizer every 4 hours  artificial tears (preservative free) Ophthalmic Solution 1 Drop(s) Both EYES every 4 hours  chlorhexidine 0.12% Liquid 15 milliLiter(s) Oral Mucosa every 12 hours  chlorhexidine 2% Cloths 1 Application(s) Topical <User Schedule>  doxazosin 4 milliGRAM(s) Oral at bedtime  erythromycin   Ointment 1 Application(s) Both EYES every 4 hours  fluticasone propionate 50 MICROgram(s)/spray Nasal Spray 1 Spray(s) Both Nostrils two times a day  gabapentin 300 milliGRAM(s) Oral every 8 hours  heparin   Injectable 5000 Unit(s) SubCutaneous every 8 hours  influenza   Vaccine 0.5 milliLiter(s) IntraMuscular once  lacosamide 200 milliGRAM(s) Oral every 12 hours  levETIRAcetam 1500 milliGRAM(s) Oral every 12 hours  LORazepam     Tablet 0.5 milliGRAM(s) Oral <User Schedule>  metoprolol tartrate 12.5 milliGRAM(s) Oral every 12 hours  pantoprazole   Suspension 40 milliGRAM(s) Oral daily  petrolatum Ophthalmic Ointment 1 Application(s) Both EYES every 4 hours  phenytoin   Suspension 150 milliGRAM(s) Oral <User Schedule>  phenytoin   Suspension 200 milliGRAM(s) Oral <User Schedule>  polyethylene glycol 3350 17 Gram(s) Oral daily  senna 2 Tablet(s) Oral at bedtime  sodium chloride 0.65% Nasal 1 Spray(s) Both Nostrils two times a day    MEDICATIONS  (PRN):  acetaminophen   Oral Liquid .. 650 milliGRAM(s) Oral every 6 hours PRN Temp greater or equal to 38C (100.4F), Mild Pain (1 - 3)  LORazepam   Injectable 2 milliGRAM(s) IV Push once PRN Seizure Activity (NOTIFY PROVIDER PRIOR TO GIVING)      Analgesic Use (Scheduled and PRNs) for past 24 hours:    acetaminophen   Oral Liquid ..   650 milliGRAM(s) Oral (03-06-25 @ 18:49)    gabapentin   300 milliGRAM(s) Oral (03-07-25 @ 13:53)   300 milliGRAM(s) Oral (03-07-25 @ 05:17)   300 milliGRAM(s) Oral (03-06-25 @ 21:08)    lacosamide   200 milliGRAM(s) Oral (03-07-25 @ 12:54)    lacosamide IVPB   140 mL/Hr IV Intermittent (03-06-25 @ 23:17)    levETIRAcetam   1500 milliGRAM(s) Oral (03-07-25 @ 05:17)   1500 milliGRAM(s) Oral (03-06-25 @ 18:50)    LORazepam     Tablet   0.5 milliGRAM(s) Oral (03-06-25 @ 21:09)    phenytoin   Suspension   150 milliGRAM(s) Oral (03-07-25 @ 11:21)   150 milliGRAM(s) Oral (03-07-25 @ 05:16)    phenytoin   Suspension   200 milliGRAM(s) Oral (03-06-25 @ 19:51)      ITEMS UNCHECKED ARE NOT PRESENT  PRESENT SYMPTOMS/REVIEW OF SYSTEMS: []Unable to obtain due to poor mentation   Source if other than patient:  []Family   []Team     PHYSICAL EXAM  middle aged Male, lying on hospital bed, in NAD  trach + vent dependent  PEG tube in place  Abd is soft and nontender  Functional quadriplegia noted;   Not able to elicit any purposeful movements today.  LE: flaccid paralysis noted bilaterally. Patient noted to have generalized jerking like spasms once with RLE passive hip flexion; no spasms noted on repetition. No rigidity noted bilaterally.    Vital Signs Last 24 Hrs  T(C): 36.6 (07 Mar 2025 14:06), Max: 36.8 (07 Mar 2025 04:52)  T(F): 97.9 (07 Mar 2025 14:06), Max: 98.2 (07 Mar 2025 04:52)  HR: 80 (07 Mar 2025 16:00) (76 - 88)  BP: 106/70 (07 Mar 2025 16:00) (106/66 - 131/92)  BP(mean): 84 (07 Mar 2025 16:00) (82 - 107)  RR: 16 (07 Mar 2025 16:00) (16 - 17)  SpO2: 99% (07 Mar 2025 16:00) (94% - 100%)    Parameters below as of 07 Mar 2025 16:00  Patient On (Oxygen Delivery Method): ventilator    O2 Concentration (%): 40    LABS: Personally reviewed and interpreted                        11.4   7.41  )-----------( 253      ( 07 Mar 2025 07:12 )             35.6   03-07    138  |  103  |  55[H]  ----------------------------<  122[H]  5.1   |  22  |  1.19    Ca    9.6      07 Mar 2025 07:12  Phos  4.4     03-07  Mg     2.3     03-07        RADIOLOGY & ADDITIONAL STUDIES: Personally reviewed and interpreted  None new    DISCUSSION OF CASE: Family - to provide updates and emotional support; Primary Team/RN - to discuss plan of care

## 2025-03-07 NOTE — PROGRESS NOTE ADULT - ASSESSMENT
44M with unclear PMH (?stroke, MI) who was found down at work, intubated for airway protection and found to have acute parenchymal hemorrhage within nabil with mass effect (+ acute/subacute right cerebellar infarct) in setting of hypertensive emergency, transferred to [facility] for further neurosurgical care. Hospital course c/b poor neurologic recovery s/p trach-PEG, AUR s/p gabriel, ANIKA on CKD c/b hyperkalemia, HAP s/p amp-sulbactam (EOT 10/23), K aerogenes bacteremia  treated with 2 weeks of Cefepime per ID , On 11/15 he became septic and was transferred to NSICU due to increased o2 requirements and needed Vent support , Trach Cultures + for Stenotrophomonas which was treated with 7 days of Bactrim per ID , has been weaned of Vent since 11/23 and is now on 10lts at 40% o2 through Trach Collar. Stepped up to the NSICU on 12/15 for hypoxia and tachycardia. Pt s/p  abx for 5 days. Pt now stepped down to 8Lach.    # Pontine hemorrhage  # Encephalopathy due to Intracranial Bleed   # Seizure   - Acute parenchymal hemorrhage within nabil with mass effect (+ acute/subacute right cerebellar infarct) in setting of hypertensive emergency.   - MRI brain also demonstrated acute/subacute R cerebellar stroke.   - No Neurosurgical Interventions were performed   - Trach, Vent and PEG Dependent    # Seizures  - Continue  Levetiracetam 1500 mg q12h and Vimpat 200 mg q12h.  -Continue Phenytoin 150mg qAM, qNoon, and 200 mg qHS ;  Gabapentin 300 mg q8h started  -Epilepsy team following, Will f/u with epilepsy team regarding gabapentin dosing   - Seizure precautions     # Hypertension  -Continue  Metoprolol     # Acute on Chronic respiratory failure   - Continue vent support and chest PT    # Neurogenic dysphagia.   - s/p PEG placement by surgery 10/21/24  - Continue TF per nutrition  - Continue aspiration precautions and elevation of HOB.    # Urinary retention  # BPH   - doxazosin 4mg qHS   - [place]/Cond[place] Catheter     # Functional quadriplegia in setting of brainstem hemorrhage  - decub precautions  - care per nursing protocol     # CKD stage IV  - Continue Free water via PEG @ 200q6h  - Continue to monitor creatinine, avoid nephrotoxics   - Cystatin C on 3/6 was 2.69 will f/u  Epilepsy team re dosing of Gabapentin     # Anemia of Chronic Disease   - Continue to monitor H/H stable ~10-11    #DVT ppx  -Continue Heparin SQ TID     #DISPO  -Palliative and Ethics following       55 minutes spent on total encounter. The necessity of the time spent during the encounter on this date of service was due to:    Review of hospital course, labs, vitals, medical records.  Bedside exam and interview of the patient  Discussed plan of care with primary team   Documenting the encounter.

## 2025-03-07 NOTE — PROCEDURE NOTE - NSINDICATIONS_GEN_A_CORE
arterial puncture to obtain ABG's/blood sampling
arterial puncture to obtain ABG's/blood sampling/critical patient/monitoring purposes
fluid administration
arterial puncture to obtain ABG's
feeding tube replacement
feeding tube replacement
arterial puncture to obtain ABG's

## 2025-03-07 NOTE — PROCEDURE NOTE - NSSITEPREP_SKIN_A_CORE
alcohol
chlorhexidine
chlorhexidine
povidone iodine (if allergic to chlorhexidine)
chlorhexidine
chlorhexidine
povidone iodine (if allergic to chlorhexidine)

## 2025-03-07 NOTE — PROCEDURE NOTE - PROCEDURE DATE TIME, MLM
20-Oct-2024 08:44
14-Feb-2025 23:24
14-Oct-2024 17:01
17-Oct-2024 18:59
18-Nov-2024 16:13
22-Nov-2024 14:38
23-Oct-2024 23:00
22-Nov-2024 13:00
30-Sep-2024 12:50
13-Feb-2025 19:56
14-Oct-2024 17:25
15-Dec-2024 13:00
07-Mar-2025 14:44

## 2025-03-07 NOTE — PROGRESS NOTE ADULT - PROBLEM SELECTOR PLAN 3
.  -patient dependent on ventilator  -vent care as per primary team  -no evidence of discomfort on vent at the moment

## 2025-03-07 NOTE — PROGRESS NOTE ADULT - NS ATTEST RISK PROBLEM GEN_ALL_CORE FT
acute illness   chronic illnesses with progression  medication management  evaluation for hospice care

## 2025-03-08 LAB
ANION GAP SERPL CALC-SCNC: 15 MMOL/L — SIGNIFICANT CHANGE UP (ref 5–17)
BUN SERPL-MCNC: 56 MG/DL — HIGH (ref 7–23)
CALCIUM SERPL-MCNC: 9.4 MG/DL — SIGNIFICANT CHANGE UP (ref 8.4–10.5)
CHLORIDE SERPL-SCNC: 101 MMOL/L — SIGNIFICANT CHANGE UP (ref 96–108)
CO2 SERPL-SCNC: 22 MMOL/L — SIGNIFICANT CHANGE UP (ref 22–31)
CREAT SERPL-MCNC: 1.15 MG/DL — SIGNIFICANT CHANGE UP (ref 0.5–1.3)
EGFR: 79 ML/MIN/1.73M2 — SIGNIFICANT CHANGE UP
EGFR: 79 ML/MIN/1.73M2 — SIGNIFICANT CHANGE UP
GLUCOSE SERPL-MCNC: 101 MG/DL — HIGH (ref 70–99)
HCT VFR BLD CALC: 30.6 % — LOW (ref 39–50)
HGB BLD-MCNC: 9.9 G/DL — LOW (ref 13–17)
MAGNESIUM SERPL-MCNC: 2.2 MG/DL — SIGNIFICANT CHANGE UP (ref 1.6–2.6)
MCHC RBC-ENTMCNC: 31.9 PG — SIGNIFICANT CHANGE UP (ref 27–34)
MCHC RBC-ENTMCNC: 32.4 G/DL — SIGNIFICANT CHANGE UP (ref 32–36)
MCV RBC AUTO: 98.7 FL — SIGNIFICANT CHANGE UP (ref 80–100)
NRBC BLD AUTO-RTO: 0 /100 WBCS — SIGNIFICANT CHANGE UP (ref 0–0)
PHOSPHATE SERPL-MCNC: 4.5 MG/DL — SIGNIFICANT CHANGE UP (ref 2.5–4.5)
PLATELET # BLD AUTO: 236 K/UL — SIGNIFICANT CHANGE UP (ref 150–400)
POTASSIUM SERPL-MCNC: 4.4 MMOL/L — SIGNIFICANT CHANGE UP (ref 3.5–5.3)
POTASSIUM SERPL-SCNC: 4.4 MMOL/L — SIGNIFICANT CHANGE UP (ref 3.5–5.3)
RBC # BLD: 3.1 M/UL — LOW (ref 4.2–5.8)
RBC # FLD: 12.9 % — SIGNIFICANT CHANGE UP (ref 10.3–14.5)
SODIUM SERPL-SCNC: 138 MMOL/L — SIGNIFICANT CHANGE UP (ref 135–145)
WBC # BLD: 6.84 K/UL — SIGNIFICANT CHANGE UP (ref 3.8–10.5)
WBC # FLD AUTO: 6.84 K/UL — SIGNIFICANT CHANGE UP (ref 3.8–10.5)

## 2025-03-08 PROCEDURE — 99232 SBSQ HOSP IP/OBS MODERATE 35: CPT

## 2025-03-08 PROCEDURE — 99233 SBSQ HOSP IP/OBS HIGH 50: CPT

## 2025-03-08 RX ADMIN — LACOSAMIDE 200 MILLIGRAM(S): 150 TABLET, FILM COATED ORAL at 17:29

## 2025-03-08 RX ADMIN — Medication 1 DROP(S): at 05:06

## 2025-03-08 RX ADMIN — HEPARIN SODIUM 5000 UNIT(S): 1000 INJECTION INTRAVENOUS; SUBCUTANEOUS at 05:08

## 2025-03-08 RX ADMIN — ERYTHROMYCIN 1 APPLICATION(S): 5 OINTMENT OPHTHALMIC at 14:07

## 2025-03-08 RX ADMIN — Medication 2 TABLET(S): at 21:24

## 2025-03-08 RX ADMIN — GABAPENTIN 300 MILLIGRAM(S): 400 CAPSULE ORAL at 21:24

## 2025-03-08 RX ADMIN — IPRATROPIUM BROMIDE AND ALBUTEROL SULFATE 3 MILLILITER(S): .5; 2.5 SOLUTION RESPIRATORY (INHALATION) at 14:07

## 2025-03-08 RX ADMIN — Medication 1 APPLICATION(S): at 05:07

## 2025-03-08 RX ADMIN — IPRATROPIUM BROMIDE AND ALBUTEROL SULFATE 3 MILLILITER(S): .5; 2.5 SOLUTION RESPIRATORY (INHALATION) at 21:25

## 2025-03-08 RX ADMIN — Medication 150 MILLIGRAM(S): at 11:18

## 2025-03-08 RX ADMIN — IPRATROPIUM BROMIDE AND ALBUTEROL SULFATE 3 MILLILITER(S): .5; 2.5 SOLUTION RESPIRATORY (INHALATION) at 17:29

## 2025-03-08 RX ADMIN — GABAPENTIN 300 MILLIGRAM(S): 400 CAPSULE ORAL at 14:07

## 2025-03-08 RX ADMIN — ERYTHROMYCIN 1 APPLICATION(S): 5 OINTMENT OPHTHALMIC at 17:30

## 2025-03-08 RX ADMIN — Medication 1 APPLICATION(S): at 14:07

## 2025-03-08 RX ADMIN — Medication 1 DROP(S): at 10:30

## 2025-03-08 RX ADMIN — LEVETIRACETAM 1500 MILLIGRAM(S): 10 INJECTION, SOLUTION INTRAVENOUS at 05:07

## 2025-03-08 RX ADMIN — FLUTICASONE PROPIONATE 1 SPRAY(S): 50 SPRAY, METERED NASAL at 17:30

## 2025-03-08 RX ADMIN — Medication 1 DROP(S): at 17:29

## 2025-03-08 RX ADMIN — ERYTHROMYCIN 1 APPLICATION(S): 5 OINTMENT OPHTHALMIC at 05:07

## 2025-03-08 RX ADMIN — Medication 1 DROP(S): at 21:25

## 2025-03-08 RX ADMIN — IPRATROPIUM BROMIDE AND ALBUTEROL SULFATE 3 MILLILITER(S): .5; 2.5 SOLUTION RESPIRATORY (INHALATION) at 10:30

## 2025-03-08 RX ADMIN — Medication 1 APPLICATION(S): at 01:07

## 2025-03-08 RX ADMIN — DOXAZOSIN MESYLATE 4 MILLIGRAM(S): 8 TABLET ORAL at 21:25

## 2025-03-08 RX ADMIN — METOPROLOL SUCCINATE 12.5 MILLIGRAM(S): 50 TABLET, EXTENDED RELEASE ORAL at 05:08

## 2025-03-08 RX ADMIN — FLUTICASONE PROPIONATE 1 SPRAY(S): 50 SPRAY, METERED NASAL at 05:07

## 2025-03-08 RX ADMIN — HEPARIN SODIUM 5000 UNIT(S): 1000 INJECTION INTRAVENOUS; SUBCUTANEOUS at 21:24

## 2025-03-08 RX ADMIN — GABAPENTIN 300 MILLIGRAM(S): 400 CAPSULE ORAL at 05:08

## 2025-03-08 RX ADMIN — Medication 15 MILLILITER(S): at 17:28

## 2025-03-08 RX ADMIN — Medication 15 MILLILITER(S): at 05:08

## 2025-03-08 RX ADMIN — Medication 1 APPLICATION(S): at 05:09

## 2025-03-08 RX ADMIN — Medication 200 MILLIGRAM(S): at 19:16

## 2025-03-08 RX ADMIN — Medication 1 APPLICATION(S): at 17:30

## 2025-03-08 RX ADMIN — Medication 1 APPLICATION(S): at 21:53

## 2025-03-08 RX ADMIN — LEVETIRACETAM 1500 MILLIGRAM(S): 10 INJECTION, SOLUTION INTRAVENOUS at 17:29

## 2025-03-08 RX ADMIN — POLYETHYLENE GLYCOL 3350 17 GRAM(S): 17 POWDER, FOR SOLUTION ORAL at 11:17

## 2025-03-08 RX ADMIN — Medication 1 SPRAY(S): at 05:06

## 2025-03-08 RX ADMIN — ERYTHROMYCIN 1 APPLICATION(S): 5 OINTMENT OPHTHALMIC at 01:08

## 2025-03-08 RX ADMIN — HEPARIN SODIUM 5000 UNIT(S): 1000 INJECTION INTRAVENOUS; SUBCUTANEOUS at 14:07

## 2025-03-08 RX ADMIN — ERYTHROMYCIN 1 APPLICATION(S): 5 OINTMENT OPHTHALMIC at 21:25

## 2025-03-08 RX ADMIN — IPRATROPIUM BROMIDE AND ALBUTEROL SULFATE 3 MILLILITER(S): .5; 2.5 SOLUTION RESPIRATORY (INHALATION) at 05:08

## 2025-03-08 RX ADMIN — IPRATROPIUM BROMIDE AND ALBUTEROL SULFATE 3 MILLILITER(S): .5; 2.5 SOLUTION RESPIRATORY (INHALATION) at 01:07

## 2025-03-08 RX ADMIN — Medication 150 MILLIGRAM(S): at 05:09

## 2025-03-08 RX ADMIN — METOPROLOL SUCCINATE 12.5 MILLIGRAM(S): 50 TABLET, EXTENDED RELEASE ORAL at 17:28

## 2025-03-08 RX ADMIN — Medication 1 DROP(S): at 01:08

## 2025-03-08 RX ADMIN — Medication 1 DROP(S): at 14:06

## 2025-03-08 RX ADMIN — Medication 40 MILLIGRAM(S): at 11:17

## 2025-03-08 RX ADMIN — Medication 1 APPLICATION(S): at 10:30

## 2025-03-08 RX ADMIN — Medication 0.5 MILLIGRAM(S): at 21:24

## 2025-03-08 RX ADMIN — LACOSAMIDE 200 MILLIGRAM(S): 150 TABLET, FILM COATED ORAL at 05:09

## 2025-03-08 RX ADMIN — ERYTHROMYCIN 1 APPLICATION(S): 5 OINTMENT OPHTHALMIC at 10:30

## 2025-03-08 RX ADMIN — Medication 1 SPRAY(S): at 17:30

## 2025-03-08 NOTE — PROGRESS NOTE ADULT - SUBJECTIVE AND OBJECTIVE BOX
Patient was seen and examined at bedside. Case discuss with the primary team. Pt w/o any events overnight.     OBJECTIVE:  Vital Signs Last 24 Hrs  T(C): 36.5 (08 Mar 2025 09:34), Max: 36.8 (08 Mar 2025 04:59)  T(F): 97.7 (08 Mar 2025 09:34), Max: 98.2 (08 Mar 2025 04:59)  HR: 72 (08 Mar 2025 07:53) (70 - 88)  BP: 127/91 (08 Mar 2025 07:53) (102/70 - 127/91)  BP(mean): 104 (08 Mar 2025 07:53) (82 - 104)  RR: 14 (08 Mar 2025 07:53) (14 - 17)  SpO2: 100% (08 Mar 2025 07:53) (94% - 100%)    Parameters below as of 08 Mar 2025 07:53  Patient On (Oxygen Delivery Method): ventilator    O2 Concentration (%): 40      PHYSICAL EXAM:  General: awake, looking comfortable, no labored breathing on vent   Lungs: limited anterior evaluation;  no crackles, no wheezes  Abdomen: PEG, soft, no distension, no visible tenderness, + BS  Extremities: warm, no edema, no visible tenderness         LABS:                        9.9    6.84  )-----------( 236      ( 08 Mar 2025 05:30 )             30.6     03-08    138  |  101  |  56[H]  ----------------------------<  101[H]  4.4   |  22  |  1.15    Ca    9.4      08 Mar 2025 05:30  Phos  4.5     03-08  Mg     2.2     03-08      acetaminophen   Oral Liquid .. 650 milliGRAM(s) Oral every 6 hours PRN  albuterol/ipratropium for Nebulization 3 milliLiter(s) Nebulizer every 4 hours  artificial tears (preservative free) Ophthalmic Solution 1 Drop(s) Both EYES every 4 hours  chlorhexidine 0.12% Liquid 15 milliLiter(s) Oral Mucosa every 12 hours  chlorhexidine 2% Cloths 1 Application(s) Topical <User Schedule>  doxazosin 4 milliGRAM(s) Oral at bedtime  erythromycin   Ointment 1 Application(s) Both EYES every 4 hours  fluticasone propionate 50 MICROgram(s)/spray Nasal Spray 1 Spray(s) Both Nostrils two times a day  gabapentin 300 milliGRAM(s) Oral every 8 hours  heparin   Injectable 5000 Unit(s) SubCutaneous every 8 hours  influenza   Vaccine 0.5 milliLiter(s) IntraMuscular once  lacosamide 200 milliGRAM(s) Oral every 12 hours  levETIRAcetam 1500 milliGRAM(s) Oral every 12 hours  LORazepam     Tablet 0.5 milliGRAM(s) Oral <User Schedule>  LORazepam   Injectable 2 milliGRAM(s) IV Push once PRN  metoprolol tartrate 12.5 milliGRAM(s) Oral every 12 hours  pantoprazole   Suspension 40 milliGRAM(s) Oral daily  petrolatum Ophthalmic Ointment 1 Application(s) Both EYES every 4 hours  phenytoin   Suspension 150 milliGRAM(s) Oral <User Schedule>  phenytoin   Suspension 200 milliGRAM(s) Oral <User Schedule>  polyethylene glycol 3350 17 Gram(s) Oral daily  senna 2 Tablet(s) Oral at bedtime  sodium chloride 0.65% Nasal 1 Spray(s) Both Nostrils two times a day

## 2025-03-08 NOTE — PROGRESS NOTE ADULT - SUBJECTIVE AND OBJECTIVE BOX
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    CULTURES:    RADIOLOGY & ADDITIONAL TESTS:      ASSESSMENT:  44M PMH ?stroke/MI present to [facility] after collapsing at work. Decorticate posturing, vomiting, intubated for airway protection. 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 Transferred to [facility] for further management. s/p trach 10/14. s/p peg 10/21. Re-upgrade to ICU 2/2 desaturation event and suctioning requirements 11/15. Re-upgrade to NSICU 12/15 2/2 desaturation and tachycardia.    PLAN:  Neuro:  - neuro/vitals q4h  - pain control: tylenol prn  - seizure tx: keppra 1500mg BID, phenytoin 150/150/200, vimpat 200mg BID, gabapentin 300 mg q8, ativan 0.5mg at bedtime (3/5-3/9)   - s/p vEEG (3/1- 3/6), s/p vEEG: +seizure on 2/17, 3/1  - CTH 9/30: enlarged pontine hemorrhage, CTH 10/3: stable, CTH 10/25: mostly resolved pontine hemorrhage, CTH 12/15: R mastoid air cell opacification; acute otitis media vs sterile effusion, CTH 12/18: stable. CTH 1/21 stable, CTH 1/27 stable, CTH 3/1: stable   - MRI brain 10/12: parenchymal hemorrhage, acute/subacute R cerebellar stroke      CV:  - -160  - HTN/tachycardia: Metoprolol 12.5 mg BID   - echo (9/30) EF 75%, repeat 12/16: 57%  - AR 3/3: 172    PULM:  - s/p trach exchange with pulm at bedside 2/14 to vent, AC/VC 40/400/14/6  - Secretions: duonebs/chest PT q4h  - CT chest 2/17: Near complete collapse b/l lower lobes. Patchy opacity within LLL/ALONDRA  - pulmonology consulted, recs appreciated    GI:  - TFs via PEG, [facility]  - bowel regimen, last BM 3/1    Renal:  - IVL; FW 200q6 for hydration  - Voiding, SC prn  - CKD: trend BUN/Cr  - renal US 10/1, 10/8, 1/15: Increased bilateral renal echogenicity consistent with medical renal disease  - urinary retention: doxazosin 4mg at bedtime     Endo:  - A1c 5.4    Heme:  - DVT ppx: SCDs, SQH 5000u q8h   - LE dopplers negative 12/16    ID:  - Afebrile, last pancx 2/17  - sputum 2/16 +Enterobacter cloacae: s/p Ertapenem (2/19-23) per ID recs, conolonized with stenotrophomonas  - empiric PNA: cefepime (12/22-12/30), s/p vanc (12/22-12/23), s/p zosyn (12/15-12/20), s/p vanc (12/15-12/16), s/p zosyn (1/12 -1/18), s/p DS bactrim TID, minocycline BID (2/18)  - Hx +klebsiella UTI & bacteremia, +stenotrophomopnas maltophilia PNA, +Enterobacter cloacae PNA     MISC:  - BL keratitis: BL erythromycin ointment, artificial tears, lacrilube q4, moisture chamber   - Penile wound: wound care rec barrier wipes     Dispo: SDU status, DNR, pending SNF    D/w Dr. D'Amico

## 2025-03-08 NOTE — PROGRESS NOTE ADULT - ASSESSMENT
44M with unclear PMH (?stroke, MI) who was found down at work, intubated for airway protection and found to have acute parenchymal hemorrhage within nabil with mass effect (+ acute/subacute right cerebellar infarct) in setting of hypertensive emergency, transferred to [facility] for further neurosurgical care. Hospital course c/b poor neurologic recovery s/p trach-PEG, AUR s/p gabriel, ANIKA on CKD c/b hyperkalemia, HAP s/p amp-sulbactam (EOT 10/23), K aerogenes bacteremia  treated with 2 weeks of Cefepime per ID , On 11/15 he became septic and was transferred to NSICU due to increased o2 requirements and needed Vent support , Trach Cultures + for Stenotrophomonas which was treated with 7 days of Bactrim per ID , has been weaned of Vent since 11/23 and is now on 10lts at 40% o2 through Trach Collar. Stepped up to the NSICU on 12/15 for hypoxia and tachycardia. Pt s/p  abx for 5 days. Pt now stepped down to 8Lach.    # Pontine hemorrhage  # Encephalopathy due to Intracranial Bleed   - Acute parenchymal hemorrhage within nabil with mass effect (+ acute/subacute right cerebellar infarct) in setting of hypertensive emergency.   - MRI brain also demonstrated acute/subacute R cerebellar stroke.   - No Neurosurgical Interventions were performed   - Trach, Vent and PEG Dependent    # Seizures  - Continue  Levetiracetam 1500 mg q12h and Vimpat 200 mg q12h.  -Continue Phenytoin 150mg qAM, qNoon, and 200 mg qHS ;  Gabapentin 300 mg q8h started  -Epilepsy team following  - Seizure precautions     # Hypertension  -Continue Metoprolol 12.5mg q12     # Acute on Chronic respiratory failure   - Continue vent support and chest PT    # Neurogenic dysphagia.   - s/p PEG placement by surgery 10/21/24  - Continue TF per nutrition  - Continue aspiration precautions and elevation of HOB.    # Urinary retention  # BPH   - doxazosin 4mg qHS   - [place] Catheter     # Functional quadriplegia in setting of brainstem hemorrhage  - decub precautions  - care per nursing protocol     # CKD stage IV  - Continue Free water via PEG @ 200q6h  - Continue to monitor creatinine, avoid nephrotoxics     # Anemia of Chronic Disease   - Continue to monitor H/H stable ~10-11    #DVT ppx  -Continue Heparin SQ TID     #DISPO  -Palliative and Ethics following       55 minutes spent on total encounter. The necessity of the time spent during the encounter on this date of service was due to:    Review of hospital course, labs, vitals, medical records.  Bedside exam and interview of the patient  Discussed plan of care with primary team   Documenting the encounter.

## 2025-03-09 LAB
ANION GAP SERPL CALC-SCNC: 14 MMOL/L — SIGNIFICANT CHANGE UP (ref 5–17)
BUN SERPL-MCNC: 55 MG/DL — HIGH (ref 7–23)
CALCIUM SERPL-MCNC: 9.4 MG/DL — SIGNIFICANT CHANGE UP (ref 8.4–10.5)
CHLORIDE SERPL-SCNC: 102 MMOL/L — SIGNIFICANT CHANGE UP (ref 96–108)
CO2 SERPL-SCNC: 23 MMOL/L — SIGNIFICANT CHANGE UP (ref 22–31)
CREAT SERPL-MCNC: 1.19 MG/DL — SIGNIFICANT CHANGE UP (ref 0.5–1.3)
EGFR: 76 ML/MIN/1.73M2 — SIGNIFICANT CHANGE UP
EGFR: 76 ML/MIN/1.73M2 — SIGNIFICANT CHANGE UP
GLUCOSE SERPL-MCNC: 142 MG/DL — HIGH (ref 70–99)
HCT VFR BLD CALC: 32.6 % — LOW (ref 39–50)
HGB BLD-MCNC: 10.3 G/DL — LOW (ref 13–17)
MAGNESIUM SERPL-MCNC: 2.1 MG/DL — SIGNIFICANT CHANGE UP (ref 1.6–2.6)
MCHC RBC-ENTMCNC: 31.6 G/DL — LOW (ref 32–36)
MCHC RBC-ENTMCNC: 31.9 PG — SIGNIFICANT CHANGE UP (ref 27–34)
MCV RBC AUTO: 100.9 FL — HIGH (ref 80–100)
NRBC BLD AUTO-RTO: 0 /100 WBCS — SIGNIFICANT CHANGE UP (ref 0–0)
PHOSPHATE SERPL-MCNC: 3.8 MG/DL — SIGNIFICANT CHANGE UP (ref 2.5–4.5)
PLATELET # BLD AUTO: 281 K/UL — SIGNIFICANT CHANGE UP (ref 150–400)
POTASSIUM SERPL-MCNC: 4.6 MMOL/L — SIGNIFICANT CHANGE UP (ref 3.5–5.3)
POTASSIUM SERPL-SCNC: 4.6 MMOL/L — SIGNIFICANT CHANGE UP (ref 3.5–5.3)
RBC # BLD: 3.23 M/UL — LOW (ref 4.2–5.8)
RBC # FLD: 12.5 % — SIGNIFICANT CHANGE UP (ref 10.3–14.5)
SODIUM SERPL-SCNC: 139 MMOL/L — SIGNIFICANT CHANGE UP (ref 135–145)
WBC # BLD: 6.87 K/UL — SIGNIFICANT CHANGE UP (ref 3.8–10.5)
WBC # FLD AUTO: 6.87 K/UL — SIGNIFICANT CHANGE UP (ref 3.8–10.5)

## 2025-03-09 PROCEDURE — 99233 SBSQ HOSP IP/OBS HIGH 50: CPT

## 2025-03-09 PROCEDURE — 99232 SBSQ HOSP IP/OBS MODERATE 35: CPT

## 2025-03-09 RX ADMIN — ERYTHROMYCIN 1 APPLICATION(S): 5 OINTMENT OPHTHALMIC at 06:11

## 2025-03-09 RX ADMIN — Medication 1 APPLICATION(S): at 21:29

## 2025-03-09 RX ADMIN — Medication 650 MILLIGRAM(S): at 06:12

## 2025-03-09 RX ADMIN — Medication 200 MILLIGRAM(S): at 19:09

## 2025-03-09 RX ADMIN — Medication 1 SPRAY(S): at 06:12

## 2025-03-09 RX ADMIN — IPRATROPIUM BROMIDE AND ALBUTEROL SULFATE 3 MILLILITER(S): .5; 2.5 SOLUTION RESPIRATORY (INHALATION) at 15:02

## 2025-03-09 RX ADMIN — Medication 1 APPLICATION(S): at 06:11

## 2025-03-09 RX ADMIN — Medication 1 APPLICATION(S): at 01:47

## 2025-03-09 RX ADMIN — LEVETIRACETAM 1500 MILLIGRAM(S): 10 INJECTION, SOLUTION INTRAVENOUS at 06:11

## 2025-03-09 RX ADMIN — Medication 40 MILLIGRAM(S): at 11:30

## 2025-03-09 RX ADMIN — Medication 1 DROP(S): at 21:30

## 2025-03-09 RX ADMIN — GABAPENTIN 300 MILLIGRAM(S): 400 CAPSULE ORAL at 06:10

## 2025-03-09 RX ADMIN — GABAPENTIN 300 MILLIGRAM(S): 400 CAPSULE ORAL at 15:03

## 2025-03-09 RX ADMIN — METOPROLOL SUCCINATE 12.5 MILLIGRAM(S): 50 TABLET, EXTENDED RELEASE ORAL at 17:58

## 2025-03-09 RX ADMIN — IPRATROPIUM BROMIDE AND ALBUTEROL SULFATE 3 MILLILITER(S): .5; 2.5 SOLUTION RESPIRATORY (INHALATION) at 09:54

## 2025-03-09 RX ADMIN — Medication 0.5 MILLIGRAM(S): at 21:28

## 2025-03-09 RX ADMIN — Medication 1 DROP(S): at 15:03

## 2025-03-09 RX ADMIN — ERYTHROMYCIN 1 APPLICATION(S): 5 OINTMENT OPHTHALMIC at 21:29

## 2025-03-09 RX ADMIN — FLUTICASONE PROPIONATE 1 SPRAY(S): 50 SPRAY, METERED NASAL at 18:00

## 2025-03-09 RX ADMIN — Medication 1 DROP(S): at 01:47

## 2025-03-09 RX ADMIN — ERYTHROMYCIN 1 APPLICATION(S): 5 OINTMENT OPHTHALMIC at 15:03

## 2025-03-09 RX ADMIN — Medication 1 SPRAY(S): at 18:00

## 2025-03-09 RX ADMIN — Medication 2 TABLET(S): at 21:29

## 2025-03-09 RX ADMIN — METOPROLOL SUCCINATE 12.5 MILLIGRAM(S): 50 TABLET, EXTENDED RELEASE ORAL at 06:10

## 2025-03-09 RX ADMIN — IPRATROPIUM BROMIDE AND ALBUTEROL SULFATE 3 MILLILITER(S): .5; 2.5 SOLUTION RESPIRATORY (INHALATION) at 01:48

## 2025-03-09 RX ADMIN — HEPARIN SODIUM 5000 UNIT(S): 1000 INJECTION INTRAVENOUS; SUBCUTANEOUS at 21:29

## 2025-03-09 RX ADMIN — IPRATROPIUM BROMIDE AND ALBUTEROL SULFATE 3 MILLILITER(S): .5; 2.5 SOLUTION RESPIRATORY (INHALATION) at 06:09

## 2025-03-09 RX ADMIN — Medication 1 APPLICATION(S): at 18:01

## 2025-03-09 RX ADMIN — GABAPENTIN 300 MILLIGRAM(S): 400 CAPSULE ORAL at 21:29

## 2025-03-09 RX ADMIN — IPRATROPIUM BROMIDE AND ALBUTEROL SULFATE 3 MILLILITER(S): .5; 2.5 SOLUTION RESPIRATORY (INHALATION) at 21:28

## 2025-03-09 RX ADMIN — Medication 650 MILLIGRAM(S): at 07:00

## 2025-03-09 RX ADMIN — LACOSAMIDE 200 MILLIGRAM(S): 150 TABLET, FILM COATED ORAL at 17:58

## 2025-03-09 RX ADMIN — Medication 1 APPLICATION(S): at 09:55

## 2025-03-09 RX ADMIN — LEVETIRACETAM 1500 MILLIGRAM(S): 10 INJECTION, SOLUTION INTRAVENOUS at 18:00

## 2025-03-09 RX ADMIN — DOXAZOSIN MESYLATE 4 MILLIGRAM(S): 8 TABLET ORAL at 21:29

## 2025-03-09 RX ADMIN — ERYTHROMYCIN 1 APPLICATION(S): 5 OINTMENT OPHTHALMIC at 18:01

## 2025-03-09 RX ADMIN — Medication 1 DROP(S): at 09:55

## 2025-03-09 RX ADMIN — Medication 150 MILLIGRAM(S): at 03:03

## 2025-03-09 RX ADMIN — Medication 1 DROP(S): at 06:11

## 2025-03-09 RX ADMIN — Medication 150 MILLIGRAM(S): at 11:30

## 2025-03-09 RX ADMIN — FLUTICASONE PROPIONATE 1 SPRAY(S): 50 SPRAY, METERED NASAL at 06:11

## 2025-03-09 RX ADMIN — LACOSAMIDE 200 MILLIGRAM(S): 150 TABLET, FILM COATED ORAL at 06:09

## 2025-03-09 RX ADMIN — IPRATROPIUM BROMIDE AND ALBUTEROL SULFATE 3 MILLILITER(S): .5; 2.5 SOLUTION RESPIRATORY (INHALATION) at 18:00

## 2025-03-09 RX ADMIN — Medication 15 MILLILITER(S): at 18:00

## 2025-03-09 RX ADMIN — Medication 15 MILLILITER(S): at 06:10

## 2025-03-09 RX ADMIN — HEPARIN SODIUM 5000 UNIT(S): 1000 INJECTION INTRAVENOUS; SUBCUTANEOUS at 15:02

## 2025-03-09 RX ADMIN — ERYTHROMYCIN 1 APPLICATION(S): 5 OINTMENT OPHTHALMIC at 01:47

## 2025-03-09 RX ADMIN — HEPARIN SODIUM 5000 UNIT(S): 1000 INJECTION INTRAVENOUS; SUBCUTANEOUS at 06:09

## 2025-03-09 RX ADMIN — Medication 1 DROP(S): at 18:01

## 2025-03-09 RX ADMIN — ERYTHROMYCIN 1 APPLICATION(S): 5 OINTMENT OPHTHALMIC at 09:54

## 2025-03-09 RX ADMIN — Medication 1 APPLICATION(S): at 15:03

## 2025-03-09 NOTE — PROGRESS NOTE ADULT - SUBJECTIVE AND OBJECTIVE BOX
Patient was seen and examined at bedside. Case discuss with the primary team. Pt was febrile with temp of  100.5 this morning.     OBJECTIVE:  Vital Signs Last 24 Hrs  T(C): 37 (09 Mar 2025 08:56), Max: 38.1 (09 Mar 2025 05:08)  T(F): 98.6 (09 Mar 2025 08:56), Max: 100.5 (09 Mar 2025 05:08)  HR: 94 (09 Mar 2025 09:07) (72 - 106)  BP: 108/71 (09 Mar 2025 08:20) (107/74 - 122/78)  BP(mean): 85 (09 Mar 2025 08:20) (84 - 94)  RR: 15 (09 Mar 2025 09:07) (14 - 16)  SpO2: 99% (09 Mar 2025 09:07) (92% - 100%)    Parameters below as of 09 Mar 2025 09:07  Patient On (Oxygen Delivery Method): ventilator    O2 Concentration (%): 40      PHYSICAL EXAM:  General: awake, looking comfortable, no labored breathing on vent   Lungs: limited anterior evaluation;  no crackles, no wheezes  Abdomen: PEG, soft, no distension, no visible tenderness, + BS  Extremities: warm, no edema, no visible tenderness   Neuro: Pt not able to follow commands       LABS:                        10.3   6.87  )-----------( 281      ( 09 Mar 2025 05:30 )             32.6     03-09    139  |  102  |  55[H]  ----------------------------<  142[H]  4.6   |  23  |  1.19    Ca    9.4      09 Mar 2025 05:30  Phos  3.8     03-09  Mg     2.1     03-09      acetaminophen   Oral Liquid .. 650 milliGRAM(s) Oral every 6 hours PRN  albuterol/ipratropium for Nebulization 3 milliLiter(s) Nebulizer every 4 hours  artificial tears (preservative free) Ophthalmic Solution 1 Drop(s) Both EYES every 4 hours  chlorhexidine 0.12% Liquid 15 milliLiter(s) Oral Mucosa every 12 hours  chlorhexidine 2% Cloths 1 Application(s) Topical <User Schedule>  doxazosin 4 milliGRAM(s) Oral at bedtime  erythromycin   Ointment 1 Application(s) Both EYES every 4 hours  fluticasone propionate 50 MICROgram(s)/spray Nasal Spray 1 Spray(s) Both Nostrils two times a day  gabapentin 300 milliGRAM(s) Oral every 8 hours  heparin   Injectable 5000 Unit(s) SubCutaneous every 8 hours  influenza   Vaccine 0.5 milliLiter(s) IntraMuscular once  lacosamide 200 milliGRAM(s) Oral every 12 hours  levETIRAcetam 1500 milliGRAM(s) Oral every 12 hours  LORazepam     Tablet 0.5 milliGRAM(s) Oral <User Schedule>  LORazepam   Injectable 2 milliGRAM(s) IV Push once PRN  metoprolol tartrate 12.5 milliGRAM(s) Oral every 12 hours  pantoprazole   Suspension 40 milliGRAM(s) Oral daily  petrolatum Ophthalmic Ointment 1 Application(s) Both EYES every 4 hours  phenytoin   Suspension 200 milliGRAM(s) Oral <User Schedule>  phenytoin   Suspension 150 milliGRAM(s) Oral <User Schedule>  polyethylene glycol 3350 17 Gram(s) Oral daily  senna 2 Tablet(s) Oral at bedtime  sodium chloride 0.65% Nasal 1 Spray(s) Both Nostrils two times a day

## 2025-03-09 NOTE — PROGRESS NOTE ADULT - ASSESSMENT
44M with unclear PMH (?stroke, MI) who was found down at work, intubated for airway protection and found to have acute parenchymal hemorrhage within nabil with mass effect (+ acute/subacute right cerebellar infarct) in setting of hypertensive emergency, transferred to [facility] for further neurosurgical care. Hospital course c/b poor neurologic recovery s/p trach-PEG, AUR s/p gabriel, ANIKA on CKD c/b hyperkalemia, HAP s/p amp-sulbactam (EOT 10/23), K aerogenes bacteremia  treated with 2 weeks of Cefepime per ID , On 11/15 he became septic and was transferred to NSICU due to increased o2 requirements and needed Vent support , Trach Cultures + for Stenotrophomonas which was treated with 7 days of Bactrim per ID , has been weaned of Vent since 11/23 and is now on 10lts at 40% o2 through Trach Collar. Stepped up to the NSICU on 12/15 for hypoxia and tachycardia. Pt s/p  abx for 5 days. Pt now stepped down to 8Lach.    # Pontine hemorrhage  # Encephalopathy due to Intracranial Bleed   #Trach, Vent and PEG Dependent  - Acute parenchymal hemorrhage within nabil with mass effect (+ acute/subacute right cerebellar infarct) in setting of hypertensive emergency.   - MRI brain also demonstrated acute/subacute R cerebellar stroke.   - No Neurosurgical Interventions were performed     #Fever   -Pt was febrile to 100.5 this morning  -Will continue to monitor fever curve   -Will seen Blood cx, COVID/RVP and check a CXR if pt spikes again     # Seizures  - Continue Levetiracetam 1500 mg q12h and Vimpat 200 mg q12h.  -Continue Phenytoin 150mg qAM, qNoon, and 200 mg qHS   -Continue  Gabapentin 300 mg q8h started; Will f/u gabapentin level   -Epilepsy team following  - Seizure precautions     # Hypertension  -Continue Metoprolol 12.5mg q12     # Acute on Chronic respiratory failure   - Continue vent support and chest PT    # Neurogenic dysphagia.   - s/p PEG placement by surgery 10/21/24  - Continue TF per nutrition  - Continue aspiration precautions and elevation of HOB.    # Urinary retention  # BPH   - doxazosin 4mg qHS   - [place] Catheter     # Functional quadriplegia in setting of brainstem hemorrhage  - decub precautions  - care per nursing protocol     # CKD stage IV  - Continue Free water via PEG @ 200q6h  - Continue to monitor creatinine, avoid nephrotoxics     # Anemia of Chronic Disease   - Continue to monitor H/H stable ~10-11    #DVT ppx  -Continue Heparin SQ TID     #DISPO  -Palliative and Ethics following       55 minutes spent on total encounter. The necessity of the time spent during the encounter on this date of service was due to:    Review of hospital course, labs, vitals, medical records.  Bedside exam and interview of the patient  Discussed plan of care with primary team   Documenting the encounter. 44M with unclear PMH (?stroke, MI) who was found down at work, intubated for airway protection and found to have acute parenchymal hemorrhage within nabil with mass effect (+ acute/subacute right cerebellar infarct) in setting of hypertensive emergency, transferred to [facility] for further neurosurgical care. Hospital course c/b poor neurologic recovery s/p trach-PEG, AUR s/p gabriel, ANIKA on CKD c/b hyperkalemia, HAP s/p amp-sulbactam (EOT 10/23), K aerogenes bacteremia  treated with 2 weeks of Cefepime per ID , On 11/15 he became septic and was transferred to NSICU due to increased o2 requirements and needed Vent support , Trach Cultures + for Stenotrophomonas which was treated with 7 days of Bactrim per ID , has been weaned of Vent since 11/23 and is now on 10lts at 40% o2 through Trach Collar. Stepped up to the NSICU on 12/15 for hypoxia and tachycardia. Pt s/p  abx for 5 days. Pt now stepped down to 8Lach.    # Pontine hemorrhage  # Encephalopathy due to Intracranial Bleed   #Trach, Vent and PEG Dependent  - Acute parenchymal hemorrhage within nabil with mass effect (+ acute/subacute right cerebellar infarct) in setting of hypertensive emergency.   - MRI brain also demonstrated acute/subacute R cerebellar stroke.   - No Neurosurgical Interventions were performed     #Fever   -Pt was febrile to 100.5 this morning  -Will continue to monitor fever curve   -Will send Blood cx, COVID/RVP and check a CXR if pt spikes again     # Seizures  - Continue Levetiracetam 1500 mg q12h and Vimpat 200 mg q12h.  -Continue Phenytoin 150mg qAM, qNoon, and 200 mg qHS   -Continue  Gabapentin 300 mg q8h started; Will f/u gabapentin level   -Epilepsy team following  - Seizure precautions     # Hypertension  -Continue Metoprolol 12.5mg q12     # Acute on Chronic respiratory failure   - Continue vent support and chest PT    # Neurogenic dysphagia.   - s/p PEG placement by surgery 10/21/24  - Continue TF per nutrition  - Continue aspiration precautions and elevation of HOB.    # Urinary retention  # BPH   - doxazosin 4mg qHS   - [place] Catheter     # Functional quadriplegia in setting of brainstem hemorrhage  - decub precautions  - care per nursing protocol     # CKD stage IV  - Continue Free water via PEG @ 200q6h  - Continue to monitor creatinine, avoid nephrotoxics     # Anemia of Chronic Disease   - Continue to monitor H/H stable ~10-11    #DVT ppx  -Continue Heparin SQ TID     #DISPO  -Palliative and Ethics following       55 minutes spent on total encounter. The necessity of the time spent during the encounter on this date of service was due to:    Review of hospital course, labs, vitals, medical records.  Bedside exam and interview of the patient  Discussed plan of care with primary team   Documenting the encounter.

## 2025-03-09 NOTE — PROGRESS NOTE ADULT - SUBJECTIVE AND OBJECTIVE BOX
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      ASSESSMENT:  44M PMH ?stroke/MI present to [facility] after collapsing at work. Decorticate posturing, vomiting, intubated for airway protection. 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 Transferred to [facility] for further management. s/p trach 10/14. s/p peg 10/21. Re-upgrade to ICU 2/2 desaturation event and suctioning requirements 11/15. Re-upgrade to NSICU 12/15 2/2 desaturation and tachycardia.    PLAN:  Neuro:  - neuro/vitals q4h  - pain control: tylenol prn  - seizure tx: keppra 1500mg BID, phenytoin 150/150/200, vimpat 200mg BID, gabapentin 300 mg q8, ativan 0.5mg at bedtime (3/5-3/9)   - s/p vEEG (3/1- 3/6), s/p vEEG: +seizure on 2/17, 3/1  - CTH 9/30: enlarged pontine hemorrhage, CTH 10/3: stable, CTH 10/25: mostly resolved pontine hemorrhage, CTH 12/15: R mastoid air cell opacification; acute otitis media vs sterile effusion, CTH 12/18: stable. CTH 1/21 stable, CTH 1/27 stable, CTH 3/1: stable   - MRI brain 10/12: parenchymal hemorrhage, acute/subacute R cerebellar stroke      CV:  - -160  - HTN/tachycardia: Metoprolol 12.5 mg BID   - echo (9/30) EF 75%, repeat 12/16: 57%  - AL 3/3: 172    PULM:  - s/p trach exchange with pulm at bedside 2/14 to vent, AC/VC 40/400/14/6  - Secretions: duonebs/chest PT q4h  - CT chest 2/17: Near complete collapse b/l lower lobes. Patchy opacity within LLL/ALONDRA  - pulmonology consulted, recs appreciated    GI:  - TFs via PEG, candelaria roseanna renal  - bowel regimen, last BM 3/1    Renal:  - IVL; FW 200q6 for hydration  - Voiding, SC prn  - CKD: trend BUN/Cr  - renal US 10/1, 10/8, 1/15: Increased bilateral renal echogenicity consistent with medical renal disease  - urinary retention: doxazosin 4mg at bedtime     Endo:  - A1c 5.4    Heme:  - DVT ppx: SCDs, SQH 5000u q8h   - LE dopplers negative 12/16    ID:  - Afebrile, last pancx 2/17  - sputum 2/16 +Enterobacter cloacae: s/p Ertapenem (2/19-23) per ID recs, conolonized with stenotrophomonas  - empiric PNA: cefepime (12/22-12/30), s/p vanc (12/22-12/23), s/p zosyn (12/15-12/20), s/p vanc (12/15-12/16), s/p zosyn (1/12 -1/18), s/p DS bactrim TID, minocycline BID (2/18)  - Hx +klebsiella UTI & bacteremia, +stenotrophomopnas maltophilia PNA, +Enterobacter cloacae PNA     MISC:  - BL keratitis: BL erythromycin ointment, artificial tears, lacrilube q4, moisture chamber   - Penile wound: wound care rec barrier wipes     Dispo: SDU status, DNR, pending SNF    D/w Dr. D'Amico

## 2025-03-10 LAB
ANION GAP SERPL CALC-SCNC: 11 MMOL/L — SIGNIFICANT CHANGE UP (ref 5–17)
BUN SERPL-MCNC: 62 MG/DL — HIGH (ref 7–23)
CALCIUM SERPL-MCNC: 9.4 MG/DL — SIGNIFICANT CHANGE UP (ref 8.4–10.5)
CHLORIDE SERPL-SCNC: 100 MMOL/L — SIGNIFICANT CHANGE UP (ref 96–108)
CO2 SERPL-SCNC: 24 MMOL/L — SIGNIFICANT CHANGE UP (ref 22–31)
CREAT SERPL-MCNC: 1.32 MG/DL — HIGH (ref 0.5–1.3)
EGFR: 67 ML/MIN/1.73M2 — SIGNIFICANT CHANGE UP
EGFR: 67 ML/MIN/1.73M2 — SIGNIFICANT CHANGE UP
GABAPENTIN SERPLBLD-MCNC: 18.8 UG/ML — HIGH (ref 4–16)
GLUCOSE SERPL-MCNC: 114 MG/DL — HIGH (ref 70–99)
HCT VFR BLD CALC: 31.9 % — LOW (ref 39–50)
HGB BLD-MCNC: 10.5 G/DL — LOW (ref 13–17)
MAGNESIUM SERPL-MCNC: 2.3 MG/DL — SIGNIFICANT CHANGE UP (ref 1.6–2.6)
MCHC RBC-ENTMCNC: 32 PG — SIGNIFICANT CHANGE UP (ref 27–34)
MCHC RBC-ENTMCNC: 32.9 G/DL — SIGNIFICANT CHANGE UP (ref 32–36)
MCV RBC AUTO: 97.3 FL — SIGNIFICANT CHANGE UP (ref 80–100)
NRBC BLD AUTO-RTO: 0 /100 WBCS — SIGNIFICANT CHANGE UP (ref 0–0)
PHOSPHATE SERPL-MCNC: 4.7 MG/DL — HIGH (ref 2.5–4.5)
PLATELET # BLD AUTO: 233 K/UL — SIGNIFICANT CHANGE UP (ref 150–400)
POTASSIUM SERPL-MCNC: 4.6 MMOL/L — SIGNIFICANT CHANGE UP (ref 3.5–5.3)
POTASSIUM SERPL-SCNC: 4.6 MMOL/L — SIGNIFICANT CHANGE UP (ref 3.5–5.3)
RBC # BLD: 3.28 M/UL — LOW (ref 4.2–5.8)
RBC # FLD: 12.5 % — SIGNIFICANT CHANGE UP (ref 10.3–14.5)
SODIUM SERPL-SCNC: 135 MMOL/L — SIGNIFICANT CHANGE UP (ref 135–145)
WBC # BLD: 6.17 K/UL — SIGNIFICANT CHANGE UP (ref 3.8–10.5)
WBC # FLD AUTO: 6.17 K/UL — SIGNIFICANT CHANGE UP (ref 3.8–10.5)

## 2025-03-10 PROCEDURE — 99232 SBSQ HOSP IP/OBS MODERATE 35: CPT

## 2025-03-10 PROCEDURE — 99233 SBSQ HOSP IP/OBS HIGH 50: CPT

## 2025-03-10 RX ORDER — SODIUM CHLORIDE 9 G/1000ML
1000 INJECTION, SOLUTION INTRAVENOUS ONCE
Refills: 0 | Status: COMPLETED | OUTPATIENT
Start: 2025-03-10 | End: 2025-03-10

## 2025-03-10 RX ORDER — GABAPENTIN 400 MG/1
300 CAPSULE ORAL EVERY 12 HOURS
Refills: 0 | Status: DISCONTINUED | OUTPATIENT
Start: 2025-03-10 | End: 2025-03-13

## 2025-03-10 RX ADMIN — Medication 150 MILLIGRAM(S): at 03:04

## 2025-03-10 RX ADMIN — LEVETIRACETAM 1500 MILLIGRAM(S): 10 INJECTION, SOLUTION INTRAVENOUS at 05:51

## 2025-03-10 RX ADMIN — HEPARIN SODIUM 5000 UNIT(S): 1000 INJECTION INTRAVENOUS; SUBCUTANEOUS at 21:37

## 2025-03-10 RX ADMIN — Medication 40 MILLIGRAM(S): at 11:14

## 2025-03-10 RX ADMIN — ERYTHROMYCIN 1 APPLICATION(S): 5 OINTMENT OPHTHALMIC at 17:33

## 2025-03-10 RX ADMIN — Medication 15 MILLILITER(S): at 17:34

## 2025-03-10 RX ADMIN — IPRATROPIUM BROMIDE AND ALBUTEROL SULFATE 3 MILLILITER(S): .5; 2.5 SOLUTION RESPIRATORY (INHALATION) at 14:34

## 2025-03-10 RX ADMIN — LACOSAMIDE 200 MILLIGRAM(S): 150 TABLET, FILM COATED ORAL at 17:34

## 2025-03-10 RX ADMIN — Medication 150 MILLIGRAM(S): at 11:15

## 2025-03-10 RX ADMIN — ERYTHROMYCIN 1 APPLICATION(S): 5 OINTMENT OPHTHALMIC at 10:20

## 2025-03-10 RX ADMIN — IPRATROPIUM BROMIDE AND ALBUTEROL SULFATE 3 MILLILITER(S): .5; 2.5 SOLUTION RESPIRATORY (INHALATION) at 02:25

## 2025-03-10 RX ADMIN — LACOSAMIDE 200 MILLIGRAM(S): 150 TABLET, FILM COATED ORAL at 05:51

## 2025-03-10 RX ADMIN — Medication 15 MILLILITER(S): at 05:51

## 2025-03-10 RX ADMIN — Medication 1 APPLICATION(S): at 05:50

## 2025-03-10 RX ADMIN — IPRATROPIUM BROMIDE AND ALBUTEROL SULFATE 3 MILLILITER(S): .5; 2.5 SOLUTION RESPIRATORY (INHALATION) at 17:35

## 2025-03-10 RX ADMIN — Medication 1 APPLICATION(S): at 14:35

## 2025-03-10 RX ADMIN — Medication 1 DROP(S): at 05:49

## 2025-03-10 RX ADMIN — POLYETHYLENE GLYCOL 3350 17 GRAM(S): 17 POWDER, FOR SOLUTION ORAL at 11:15

## 2025-03-10 RX ADMIN — HEPARIN SODIUM 5000 UNIT(S): 1000 INJECTION INTRAVENOUS; SUBCUTANEOUS at 14:34

## 2025-03-10 RX ADMIN — METOPROLOL SUCCINATE 12.5 MILLIGRAM(S): 50 TABLET, EXTENDED RELEASE ORAL at 17:35

## 2025-03-10 RX ADMIN — Medication 1 APPLICATION(S): at 21:38

## 2025-03-10 RX ADMIN — Medication 1 DROP(S): at 10:20

## 2025-03-10 RX ADMIN — ERYTHROMYCIN 1 APPLICATION(S): 5 OINTMENT OPHTHALMIC at 02:25

## 2025-03-10 RX ADMIN — Medication 1 DROP(S): at 21:38

## 2025-03-10 RX ADMIN — ERYTHROMYCIN 1 APPLICATION(S): 5 OINTMENT OPHTHALMIC at 21:37

## 2025-03-10 RX ADMIN — IPRATROPIUM BROMIDE AND ALBUTEROL SULFATE 3 MILLILITER(S): .5; 2.5 SOLUTION RESPIRATORY (INHALATION) at 05:51

## 2025-03-10 RX ADMIN — SODIUM CHLORIDE 500 MILLILITER(S): 9 INJECTION, SOLUTION INTRAVENOUS at 10:20

## 2025-03-10 RX ADMIN — DOXAZOSIN MESYLATE 4 MILLIGRAM(S): 8 TABLET ORAL at 21:38

## 2025-03-10 RX ADMIN — Medication 1 DROP(S): at 02:25

## 2025-03-10 RX ADMIN — Medication 1 APPLICATION(S): at 10:20

## 2025-03-10 RX ADMIN — LEVETIRACETAM 1500 MILLIGRAM(S): 10 INJECTION, SOLUTION INTRAVENOUS at 17:34

## 2025-03-10 RX ADMIN — Medication 1 SPRAY(S): at 05:50

## 2025-03-10 RX ADMIN — IPRATROPIUM BROMIDE AND ALBUTEROL SULFATE 3 MILLILITER(S): .5; 2.5 SOLUTION RESPIRATORY (INHALATION) at 10:20

## 2025-03-10 RX ADMIN — FLUTICASONE PROPIONATE 1 SPRAY(S): 50 SPRAY, METERED NASAL at 05:50

## 2025-03-10 RX ADMIN — Medication 1 APPLICATION(S): at 05:52

## 2025-03-10 RX ADMIN — Medication 200 MILLIGRAM(S): at 19:59

## 2025-03-10 RX ADMIN — FLUTICASONE PROPIONATE 1 SPRAY(S): 50 SPRAY, METERED NASAL at 17:33

## 2025-03-10 RX ADMIN — Medication 2 TABLET(S): at 21:37

## 2025-03-10 RX ADMIN — HEPARIN SODIUM 5000 UNIT(S): 1000 INJECTION INTRAVENOUS; SUBCUTANEOUS at 05:50

## 2025-03-10 RX ADMIN — Medication 1 APPLICATION(S): at 02:25

## 2025-03-10 RX ADMIN — IPRATROPIUM BROMIDE AND ALBUTEROL SULFATE 3 MILLILITER(S): .5; 2.5 SOLUTION RESPIRATORY (INHALATION) at 21:38

## 2025-03-10 RX ADMIN — Medication 1 DROP(S): at 17:34

## 2025-03-10 RX ADMIN — GABAPENTIN 300 MILLIGRAM(S): 400 CAPSULE ORAL at 05:51

## 2025-03-10 RX ADMIN — ERYTHROMYCIN 1 APPLICATION(S): 5 OINTMENT OPHTHALMIC at 14:35

## 2025-03-10 RX ADMIN — Medication 1 DROP(S): at 14:35

## 2025-03-10 RX ADMIN — METOPROLOL SUCCINATE 12.5 MILLIGRAM(S): 50 TABLET, EXTENDED RELEASE ORAL at 05:51

## 2025-03-10 RX ADMIN — ERYTHROMYCIN 1 APPLICATION(S): 5 OINTMENT OPHTHALMIC at 05:50

## 2025-03-10 RX ADMIN — Medication 1 SPRAY(S): at 17:34

## 2025-03-10 RX ADMIN — GABAPENTIN 300 MILLIGRAM(S): 400 CAPSULE ORAL at 14:34

## 2025-03-10 RX ADMIN — Medication 1 APPLICATION(S): at 17:33

## 2025-03-10 NOTE — PROGRESS NOTE ADULT - SUBJECTIVE AND OBJECTIVE BOX
[facility]  Roger Rangel Fernandez, Geriatrics & Palliative Medicine attending  Contact Info: Call [phone] (HEAL Line) or message on [employer] Teams    SUBJECTIVE AND OBJECTIVE:  The patient was seen at bedside. Olivier was at bedside and provided collateral information.  No new complaints were reported. The patient was somnolent and did not wake up to voice or touch.    Collateral information also obtained from nursing staff.  No report of pain behavior. They notice intermittent grimacing during care that responds to tylenol PRN.    INTERVAL HPI/OVERNIGHT EVENTS: Interval events noted. See patient's PRN use for the past 24hrs noted below. Comprehensive symptom assessment and GOC exploration as noted below. Extensive time spent discussing plan of care with patient/family. No unexpected adverse effects of opiates noted: no sedation/dizziness/nausea.    ALLERGIES:  Allergy Status Unknown    MEDICATIONS  (STANDING):  albuterol/ipratropium for Nebulization 3 milliLiter(s) Nebulizer every 4 hours  artificial tears (preservative free) Ophthalmic Solution 1 Drop(s) Both EYES every 4 hours  chlorhexidine 0.12% Liquid 15 milliLiter(s) Oral Mucosa every 12 hours  chlorhexidine 2% Cloths 1 Application(s) Topical <User Schedule>  doxazosin 4 milliGRAM(s) Oral at bedtime  erythromycin   Ointment 1 Application(s) Both EYES every 4 hours  fluticasone propionate 50 MICROgram(s)/spray Nasal Spray 1 Spray(s) Both Nostrils two times a day  gabapentin 300 milliGRAM(s) Oral every 8 hours  heparin   Injectable 5000 Unit(s) SubCutaneous every 8 hours  influenza   Vaccine 0.5 milliLiter(s) IntraMuscular once  lacosamide 200 milliGRAM(s) Oral every 12 hours  levETIRAcetam 1500 milliGRAM(s) Oral every 12 hours  metoprolol tartrate 12.5 milliGRAM(s) Oral every 12 hours  pantoprazole   Suspension 40 milliGRAM(s) Oral daily  petrolatum Ophthalmic Ointment 1 Application(s) Both EYES every 4 hours  phenytoin   Suspension 150 milliGRAM(s) Oral <User Schedule>  phenytoin   Suspension 200 milliGRAM(s) Oral <User Schedule>  polyethylene glycol 3350 17 Gram(s) Oral daily  senna 2 Tablet(s) Oral at bedtime  sodium chloride 0.65% Nasal 1 Spray(s) Both Nostrils two times a day    MEDICATIONS  (PRN):  acetaminophen   Oral Liquid .. 650 milliGRAM(s) Oral every 6 hours PRN Temp greater or equal to 38C (100.4F), Mild Pain (1 - 3)  LORazepam   Injectable 2 milliGRAM(s) IV Push once PRN Seizure Activity (NOTIFY PROVIDER PRIOR TO GIVING)      Analgesic Use (Scheduled and PRNs) for past 24 hours:    gabapentin   300 milliGRAM(s) Oral (03-10-25 @ 14:34)   300 milliGRAM(s) Oral (03-10-25 @ 05:51)   300 milliGRAM(s) Oral (03-09-25 @ 21:29)    lacosamide   200 milliGRAM(s) Oral (03-10-25 @ 17:34)   200 milliGRAM(s) Oral (03-10-25 @ 05:51)   200 milliGRAM(s) Oral (03-09-25 @ 17:58)    levETIRAcetam   1500 milliGRAM(s) Oral (03-10-25 @ 17:34)   1500 milliGRAM(s) Oral (03-10-25 @ 05:51)   1500 milliGRAM(s) Oral (03-09-25 @ 18:00)    LORazepam     Tablet   0.5 milliGRAM(s) Oral (03-09-25 @ 21:28)    phenytoin   Suspension   150 milliGRAM(s) Oral (03-10-25 @ 11:15)   150 milliGRAM(s) Oral (03-10-25 @ 03:04)    phenytoin   Suspension   200 milliGRAM(s) Oral (03-09-25 @ 19:09)      ITEMS UNCHECKED ARE NOT PRESENT  PRESENT SYMPTOMS/REVIEW OF SYSTEMS: []Unable to obtain due to poor mentation   Source if other than patient:  []Family   []Team     PHYSICAL EXAM  middle aged Male, lying on hospital bed, in NAD  trach + vent dependent  PEG tube in place  Abd is soft and nontender  Functional quadriplegia noted;   Not able to elicit any purposeful movements today.    Vital Signs Last 24 Hrs  T(C): 36.6 (10 Mar 2025 08:58), Max: 36.6 (09 Mar 2025 22:00)  T(F): 97.8 (10 Mar 2025 08:58), Max: 97.9 (09 Mar 2025 22:00)  HR: 66 (10 Mar 2025 16:04) (66 - 80)  BP: 109/73 (10 Mar 2025 16:04) (103/66 - 127/92)  BP(mean): 88 (10 Mar 2025 16:04) (80 - 105)  RR: 17 (10 Mar 2025 16:04) (14 - 17)  SpO2: 96% (10 Mar 2025 16:04) (96% - 100%)    Parameters below as of 10 Mar 2025 16:04  Patient On (Oxygen Delivery Method): ventilator    O2 Concentration (%): 40    LABS: Personally reviewed and interpreted                        10.5   6.17  )-----------( 233      ( 10 Mar 2025 05:30 )             31.9   03-10    135  |  100  |  62[H]  ----------------------------<  114[H]  4.6   |  24  |  1.32[H]    Ca    9.4      10 Mar 2025 05:30  Phos  4.7     03-10  Mg     2.3     03-10        RADIOLOGY & ADDITIONAL STUDIES: Personally reviewed and interpreted  None new    DISCUSSION OF CASE: Family - to provide updates and emotional support; Primary Team/RN - to discuss plan of care

## 2025-03-10 NOTE — PROGRESS NOTE ADULT - SUBJECTIVE AND OBJECTIVE BOX
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 [facility] after collapsing at work. Decorticate posturing, vomiting, intubated for airway protection. 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 Transferred to [facility] for further management. s/p trach 10/14. s/p peg 10/21. Upgrade to ICU 2/2 desaturation event and suctioning requirements 11/15. Re-upgrade to NSICU 12/15 2/2 desaturation and tachycardia.    PLAN:  Neuro:  - neuro/vitals q4h  - pain control: tylenol prn  - seizure tx: keppra 1500mg BID, phenytoin 150/150/200, vimpat 200mg BID, gabapentin 300 mg q8, ativan 0.5mg at bedtime (3/5-3/9)   - s/p vEEG (3/1- 3/6), s/p vEEG: +seizure on 2/17, 3/1  - CTH 9/30: enlarged pontine hemorrhage, CTH 10/3: stable, CTH 10/25: mostly resolved pontine hemorrhage, CTH 12/15: R mastoid air cell opacification; acute otitis media vs sterile effusion, CTH 12/18: stable. CTH 1/21 stable, CTH 1/27 stable, CTH 3/1: stable   - MRI brain 10/12: parenchymal hemorrhage, acute/subacute R cerebellar stroke      CV:  - -160  - HTN/tachycardia: Metoprolol 12.5 mg BID   - echo (9/30) EF 75%, repeat 12/16: 57%  - MI 3/3: 172    PULM:  - s/p trach exchange with pulm at bedside 2/14 to vent, AC/VC 40/400/14/6  - Secretions: duonebs/chest PT q4h  - CT chest 2/17: Near complete collapse b/l lower lobes. Patchy opacity within LLL/ALONDRA  - pulmonology consulted, recs appreciated    GI:  - TFs via PEG, candelaria [facility] renal  - bowel regimen, last BM 3/1    Renal:  - IVL; FW 200q6 for hydration  - Voiding, SC prn  - CKD: trend BUN/Cr  - renal US 10/1, 10/8, 1/15: Increased b/l renal echogenicity c/w medical renal disease  - urinary retention: doxazosin 4mg at bedtime     Endo:  - A1c 5.4    Heme:  - DVT ppx: SCDs, SQH 5000u q8h   - LE dopplers negative 12/16    ID:  - Afebrile, last pancx 2/17  - sputum 2/16 +Enterobacter cloacae: s/p Ertapenem (2/19-23) per ID recs, conolonized with stenotrophomonas  - empiric PNA: cefepime (12/22-12/30), s/p vanc (12/22-12/23), s/p zosyn (12/15-12/20), s/p vanc (12/15-12/16), s/p zosyn (1/12 -1/18), s/p DS bactrim TID, minocycline BID (2/18)  - Hx +klebsiella UTI & bacteremia, +stenotrophomopnas maltophilia PNA, +Enterobacter cloacae PNA     MISC:  - BL keratitis: BL erythromycin ointment, artificial tears, lacrilube q4, moisture chamber   - Penile wound: wound care rec barrier wipes     Dispo: SDU status, DNR, pending SNF    D/w Dr. D'Amico

## 2025-03-10 NOTE — PROGRESS NOTE ADULT - SUBJECTIVE AND OBJECTIVE BOX
SUBJECTIVE:  Patient was seen and examined at bedside. Patient open eyes to voice, looking comfortable, not following commands. No other complaints or events reported    Review of systems: unable to obtain     Diet, NPO with Tube Feed:   Tube Feeding Modality: Gastrostomy  Maribeth Sauceda Renal  Total Volume for 24 Hours (mL): 1062  Total Number of Cans: 5  Continuous  Starting Tube Feed Rate mL per Hour: 59  Increase Tube Feed Rate by (mL): 0     Every 4 hours  Until Goal Tube Feed Rate (mL per Hour): 59  Tube Feed Duration (in Hours): 18  Tube Feed Start Time: 11:00  Tube Feed Stop Time: 05:00 (02-20-25 @ 10:42) [Active]      MEDICATIONS:  MEDICATIONS  (STANDING):  albuterol/ipratropium for Nebulization 3 milliLiter(s) Nebulizer every 4 hours  artificial tears (preservative free) Ophthalmic Solution 1 Drop(s) Both EYES every 4 hours  chlorhexidine 0.12% Liquid 15 milliLiter(s) Oral Mucosa every 12 hours  chlorhexidine 2% Cloths 1 Application(s) Topical <User Schedule>  doxazosin 4 milliGRAM(s) Oral at bedtime  erythromycin   Ointment 1 Application(s) Both EYES every 4 hours  fluticasone propionate 50 MICROgram(s)/spray Nasal Spray 1 Spray(s) Both Nostrils two times a day  gabapentin 300 milliGRAM(s) Oral every 8 hours  heparin   Injectable 5000 Unit(s) SubCutaneous every 8 hours  influenza   Vaccine 0.5 milliLiter(s) IntraMuscular once  lacosamide 200 milliGRAM(s) Oral every 12 hours  levETIRAcetam 1500 milliGRAM(s) Oral every 12 hours  metoprolol tartrate 12.5 milliGRAM(s) Oral every 12 hours  pantoprazole   Suspension 40 milliGRAM(s) Oral daily  petrolatum Ophthalmic Ointment 1 Application(s) Both EYES every 4 hours  phenytoin   Suspension 150 milliGRAM(s) Oral <User Schedule>  phenytoin   Suspension 200 milliGRAM(s) Oral <User Schedule>  polyethylene glycol 3350 17 Gram(s) Oral daily  senna 2 Tablet(s) Oral at bedtime  sodium chloride 0.65% Nasal 1 Spray(s) Both Nostrils two times a day    MEDICATIONS  (PRN):  acetaminophen   Oral Liquid .. 650 milliGRAM(s) Oral every 6 hours PRN Temp greater or equal to 38C (100.4F), Mild Pain (1 - 3)  LORazepam   Injectable 2 milliGRAM(s) IV Push once PRN Seizure Activity (NOTIFY PROVIDER PRIOR TO GIVING)      Allergies    Allergy Status Unknown    Intolerances        OBJECTIVE:  Vital Signs Last 24 Hrs  T(C): 36.6 (10 Mar 2025 08:58), Max: 36.6 (09 Mar 2025 22:00)  T(F): 97.8 (10 Mar 2025 08:58), Max: 97.9 (09 Mar 2025 22:00)  HR: 70 (10 Mar 2025 08:50) (70 - 90)  BP: 127/92 (10 Mar 2025 08:16) (101/69 - 127/92)  BP(mean): 105 (10 Mar 2025 08:16) (80 - 105)  RR: 14 (10 Mar 2025 08:50) (14 - 16)  SpO2: 97% (10 Mar 2025 08:50) (96% - 100%)    Parameters below as of 10 Mar 2025 08:50  Patient On (Oxygen Delivery Method): ventilator    O2 Concentration (%): 40  I&O's Summary    09 Mar 2025 07:01  -  10 Mar 2025 07:00  --------------------------------------------------------  IN: 1826 mL / OUT: 1175 mL / NET: 651 mL    10 Mar 2025 07:01  -  10 Mar 2025 11:04  --------------------------------------------------------  IN: 0 mL / OUT: 20 mL / NET: -20 mL        PHYSICAL EXAM:  General: lethargic, NAD, no labored breathing on vent  HEENT: facial twitching, trach in place, clean  Lungs: limited, no crackles, no wheezes  Heart: regular  Abdomen: soft, no visible tenderness  Extremities:  warm, no edema, no visible tenderness, not following commands     LABS:                        10.5   6.17  )-----------( 233      ( 10 Mar 2025 05:30 )             31.9     03-10    135  |  100  |  62[H]  ----------------------------<  114[H]  4.6   |  24  |  1.32[H]    Ca    9.4      10 Mar 2025 05:30  Phos  4.7     03-10  Mg     2.3     03-10          CAPILLARY BLOOD GLUCOSE        Urinalysis Basic - ( 10 Mar 2025 05:30 )    Color: x / Appearance: x / SG: x / pH: x  Gluc: 114 mg/dL / Ketone: x  / Bili: x / Urobili: x   Blood: x / Protein: x / Nitrite: x   Leuk Esterase: x / RBC: x / WBC x   Sq Epi: x / Non Sq Epi: x / Bacteria: x        MICRODATA:      RADIOLOGY/OTHER STUDIES:

## 2025-03-10 NOTE — PROGRESS NOTE ADULT - TIME BILLING
Bedside exam and interview   Reviewed vitals, labs, hospital course    Discussed patient's plan of care with primary team   Documentation of encounter

## 2025-03-10 NOTE — PROGRESS NOTE ADULT - ASSESSMENT
43yo M w/ reported PMHx of MI and previous strokes, who was admitted to [facility] on 09/30/2024 for AMS, found to have acute large parenchymal hemorrhage within nabil with mass effect (+ acute/subacute right cerebellar infarct) in setting of hypertensive emergency, and R cerebellar stroke , c/b poor neurologic recovery, now trach/vent dependent/PEG, with recurrent fever/infections and ICU readmissions a/w focal status epilepticus. 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    PPS 10%. Prognosis is poor.  Patient is at high risk of death and complications.  Clinically stable; seizure episodes being controlled, as per epilepsy team.  GOC are to prolong life.  FULL CODE.

## 2025-03-10 NOTE — PROGRESS NOTE ADULT - ASSESSMENT
44M with unclear PMH (?stroke, MI) who was found down at work, intubated for airway protection and found to have acute parenchymal hemorrhage within nabil with mass effect (+ acute/subacute right cerebellar infarct) in setting of hypertensive emergency, transferred to [facility] for further neurosurgical care. Hospital course c/b poor neurologic recovery s/p trach-PEG, AUR s/p gabriel, ANIKA on CKD c/b hyperkalemia, HAP s/p amp-sulbactam (EOT 10/23), K aerogenes bacteremia  treated with 2 weeks of Cefepime per ID , On 11/15 he became septic and was transferred to NSICU due to increased o2 requirements and needed Vent support , Trach Cultures + for Stenotrophomonas which was treated with 7 days of Bactrim per ID , has been weaned of Vent since 11/23 and is now on 10lts at 40% o2 through Trach Collar. Stepped up to the NSICU on 12/15 for hypoxia and tachycardia. Pt s/p  abx for 5 days. Pt now stepped down to 8Lach.    # Pontine hemorrhage  # Encephalopathy due to Intracranial Bleed   #Trach, Vent and PEG Dependent  - Acute parenchymal hemorrhage within nabil with mass effect (+ acute/subacute right cerebellar infarct) in setting of hypertensive emergency.   - MRI brain also demonstrated acute/subacute R cerebellar stroke.   - No Neurosurgical Interventions were performed     # ANIKA on CKD stage IV  - Patient with worsening creatinine, monitor UOP. Bladder scan q6h, Will give IVF and monitor response  - Will need to adjust medications to Gfr and Epilepsy recs   - Continue Free water via PEG @ 200q6h  - Continue to monitor creatinine, avoid nephrotoxics     #Fever   -Afebrile, continue to monitor    # Seizures  - Continue Levetiracetam 1500 mg q12h and Vimpat 200 mg q12h.  -Continue Phenytoin 150mg qAM, qNoon, and 200 mg qHS   -Continue  Gabapentin 300 mg q8h started; Will f/u gabapentin level   -Epilepsy team following  - Seizure precautions     # Hypertension  -Continue Metoprolol 12.5mg q12     # Acute on Chronic respiratory failure   - Continue vent support and chest PT    # Neurogenic dysphagia.   - s/p PEG placement by surgery 10/21/24  - Continue TF per nutrition  - Continue aspiration precautions and elevation of HOB.    # Urinary retention  # BPH   - doxazosin 4mg qHS   - [place] Catheter     # Functional quadriplegia in setting of brainstem hemorrhage  - decub precautions  - care per nursing protocol     # Anemia of Chronic Disease   -  monitor H/H stable ~10-11    #DVT ppx  -Heparin SQ TID     #DISPO  -Palliative and Ethics following

## 2025-03-11 LAB
ALBUMIN SERPL ELPH-MCNC: 3.3 G/DL — SIGNIFICANT CHANGE UP (ref 3.3–5)
ALP SERPL-CCNC: 197 U/L — HIGH (ref 40–120)
ALT FLD-CCNC: 11 U/L — SIGNIFICANT CHANGE UP (ref 10–45)
ANION GAP SERPL CALC-SCNC: 16 MMOL/L — SIGNIFICANT CHANGE UP (ref 5–17)
AST SERPL-CCNC: 12 U/L — SIGNIFICANT CHANGE UP (ref 10–40)
BILIRUB DIRECT SERPL-MCNC: <0.1 MG/DL — SIGNIFICANT CHANGE UP (ref 0–0.3)
BILIRUB INDIRECT FLD-MCNC: SIGNIFICANT CHANGE UP MG/DL (ref 0.2–1)
BILIRUB SERPL-MCNC: <0.2 MG/DL — SIGNIFICANT CHANGE UP (ref 0.2–1.2)
BUN SERPL-MCNC: 59 MG/DL — HIGH (ref 7–23)
CALCIUM SERPL-MCNC: 9.2 MG/DL — SIGNIFICANT CHANGE UP (ref 8.4–10.5)
CHLORIDE SERPL-SCNC: 100 MMOL/L — SIGNIFICANT CHANGE UP (ref 96–108)
CO2 SERPL-SCNC: 21 MMOL/L — LOW (ref 22–31)
CREAT SERPL-MCNC: 1.21 MG/DL — SIGNIFICANT CHANGE UP (ref 0.5–1.3)
EGFR: 75 ML/MIN/1.73M2 — SIGNIFICANT CHANGE UP
EGFR: 75 ML/MIN/1.73M2 — SIGNIFICANT CHANGE UP
GLUCOSE SERPL-MCNC: 147 MG/DL — HIGH (ref 70–99)
HCT VFR BLD CALC: 32.7 % — LOW (ref 39–50)
HGB BLD-MCNC: 10.7 G/DL — LOW (ref 13–17)
MAGNESIUM SERPL-MCNC: 2.3 MG/DL — SIGNIFICANT CHANGE UP (ref 1.6–2.6)
MCHC RBC-ENTMCNC: 31.8 PG — SIGNIFICANT CHANGE UP (ref 27–34)
MCHC RBC-ENTMCNC: 32.7 G/DL — SIGNIFICANT CHANGE UP (ref 32–36)
MCV RBC AUTO: 97.3 FL — SIGNIFICANT CHANGE UP (ref 80–100)
NRBC BLD AUTO-RTO: 0 /100 WBCS — SIGNIFICANT CHANGE UP (ref 0–0)
PHOSPHATE SERPL-MCNC: 4.9 MG/DL — HIGH (ref 2.5–4.5)
PLATELET # BLD AUTO: 213 K/UL — SIGNIFICANT CHANGE UP (ref 150–400)
POTASSIUM SERPL-MCNC: 4.3 MMOL/L — SIGNIFICANT CHANGE UP (ref 3.5–5.3)
POTASSIUM SERPL-SCNC: 4.3 MMOL/L — SIGNIFICANT CHANGE UP (ref 3.5–5.3)
PROT SERPL-MCNC: 7.2 G/DL — SIGNIFICANT CHANGE UP (ref 6–8.3)
RBC # BLD: 3.36 M/UL — LOW (ref 4.2–5.8)
RBC # FLD: 12.7 % — SIGNIFICANT CHANGE UP (ref 10.3–14.5)
SODIUM SERPL-SCNC: 137 MMOL/L — SIGNIFICANT CHANGE UP (ref 135–145)
WBC # BLD: 8.81 K/UL — SIGNIFICANT CHANGE UP (ref 3.8–10.5)
WBC # FLD AUTO: 8.81 K/UL — SIGNIFICANT CHANGE UP (ref 3.8–10.5)

## 2025-03-11 PROCEDURE — 99231 SBSQ HOSP IP/OBS SF/LOW 25: CPT

## 2025-03-11 PROCEDURE — 99233 SBSQ HOSP IP/OBS HIGH 50: CPT

## 2025-03-11 RX ORDER — BISACODYL 5 MG
10 TABLET, DELAYED RELEASE (ENTERIC COATED) ORAL ONCE
Refills: 0 | Status: COMPLETED | OUTPATIENT
Start: 2025-03-11 | End: 2025-03-11

## 2025-03-11 RX ORDER — METOPROLOL SUCCINATE 50 MG/1
12.5 TABLET, EXTENDED RELEASE ORAL EVERY 12 HOURS
Refills: 0 | Status: DISCONTINUED | OUTPATIENT
Start: 2025-03-11 | End: 2025-03-13

## 2025-03-11 RX ADMIN — IPRATROPIUM BROMIDE AND ALBUTEROL SULFATE 3 MILLILITER(S): .5; 2.5 SOLUTION RESPIRATORY (INHALATION) at 21:55

## 2025-03-11 RX ADMIN — LACOSAMIDE 200 MILLIGRAM(S): 150 TABLET, FILM COATED ORAL at 17:12

## 2025-03-11 RX ADMIN — GABAPENTIN 300 MILLIGRAM(S): 400 CAPSULE ORAL at 13:37

## 2025-03-11 RX ADMIN — Medication 1 APPLICATION(S): at 10:14

## 2025-03-11 RX ADMIN — ERYTHROMYCIN 1 APPLICATION(S): 5 OINTMENT OPHTHALMIC at 05:59

## 2025-03-11 RX ADMIN — Medication 1 APPLICATION(S): at 21:56

## 2025-03-11 RX ADMIN — LACOSAMIDE 200 MILLIGRAM(S): 150 TABLET, FILM COATED ORAL at 05:58

## 2025-03-11 RX ADMIN — LEVETIRACETAM 1500 MILLIGRAM(S): 10 INJECTION, SOLUTION INTRAVENOUS at 17:12

## 2025-03-11 RX ADMIN — Medication 200 MILLIGRAM(S): at 19:04

## 2025-03-11 RX ADMIN — ERYTHROMYCIN 1 APPLICATION(S): 5 OINTMENT OPHTHALMIC at 17:13

## 2025-03-11 RX ADMIN — Medication 1 DROP(S): at 21:56

## 2025-03-11 RX ADMIN — IPRATROPIUM BROMIDE AND ALBUTEROL SULFATE 3 MILLILITER(S): .5; 2.5 SOLUTION RESPIRATORY (INHALATION) at 17:12

## 2025-03-11 RX ADMIN — ERYTHROMYCIN 1 APPLICATION(S): 5 OINTMENT OPHTHALMIC at 21:55

## 2025-03-11 RX ADMIN — Medication 1 DROP(S): at 17:11

## 2025-03-11 RX ADMIN — Medication 1 APPLICATION(S): at 01:02

## 2025-03-11 RX ADMIN — Medication 15 MILLILITER(S): at 05:58

## 2025-03-11 RX ADMIN — HEPARIN SODIUM 5000 UNIT(S): 1000 INJECTION INTRAVENOUS; SUBCUTANEOUS at 21:55

## 2025-03-11 RX ADMIN — IPRATROPIUM BROMIDE AND ALBUTEROL SULFATE 3 MILLILITER(S): .5; 2.5 SOLUTION RESPIRATORY (INHALATION) at 05:58

## 2025-03-11 RX ADMIN — Medication 1 APPLICATION(S): at 05:59

## 2025-03-11 RX ADMIN — FLUTICASONE PROPIONATE 1 SPRAY(S): 50 SPRAY, METERED NASAL at 17:13

## 2025-03-11 RX ADMIN — Medication 40 MILLIGRAM(S): at 11:57

## 2025-03-11 RX ADMIN — Medication 15 MILLILITER(S): at 17:12

## 2025-03-11 RX ADMIN — Medication 1 DROP(S): at 01:02

## 2025-03-11 RX ADMIN — ERYTHROMYCIN 1 APPLICATION(S): 5 OINTMENT OPHTHALMIC at 10:14

## 2025-03-11 RX ADMIN — IPRATROPIUM BROMIDE AND ALBUTEROL SULFATE 3 MILLILITER(S): .5; 2.5 SOLUTION RESPIRATORY (INHALATION) at 10:14

## 2025-03-11 RX ADMIN — FLUTICASONE PROPIONATE 1 SPRAY(S): 50 SPRAY, METERED NASAL at 06:00

## 2025-03-11 RX ADMIN — Medication 2 TABLET(S): at 21:55

## 2025-03-11 RX ADMIN — Medication 1 APPLICATION(S): at 13:38

## 2025-03-11 RX ADMIN — Medication 150 MILLIGRAM(S): at 11:57

## 2025-03-11 RX ADMIN — DOXAZOSIN MESYLATE 4 MILLIGRAM(S): 8 TABLET ORAL at 21:55

## 2025-03-11 RX ADMIN — POLYETHYLENE GLYCOL 3350 17 GRAM(S): 17 POWDER, FOR SOLUTION ORAL at 11:57

## 2025-03-11 RX ADMIN — LEVETIRACETAM 1500 MILLIGRAM(S): 10 INJECTION, SOLUTION INTRAVENOUS at 05:58

## 2025-03-11 RX ADMIN — METOPROLOL SUCCINATE 12.5 MILLIGRAM(S): 50 TABLET, EXTENDED RELEASE ORAL at 05:58

## 2025-03-11 RX ADMIN — Medication 1 SPRAY(S): at 06:00

## 2025-03-11 RX ADMIN — IPRATROPIUM BROMIDE AND ALBUTEROL SULFATE 3 MILLILITER(S): .5; 2.5 SOLUTION RESPIRATORY (INHALATION) at 01:02

## 2025-03-11 RX ADMIN — Medication 1 DROP(S): at 13:39

## 2025-03-11 RX ADMIN — Medication 1 APPLICATION(S): at 06:00

## 2025-03-11 RX ADMIN — GABAPENTIN 300 MILLIGRAM(S): 400 CAPSULE ORAL at 01:02

## 2025-03-11 RX ADMIN — Medication 1 DROP(S): at 05:59

## 2025-03-11 RX ADMIN — Medication 1 DROP(S): at 10:14

## 2025-03-11 RX ADMIN — IPRATROPIUM BROMIDE AND ALBUTEROL SULFATE 3 MILLILITER(S): .5; 2.5 SOLUTION RESPIRATORY (INHALATION) at 13:37

## 2025-03-11 RX ADMIN — Medication 1 APPLICATION(S): at 17:11

## 2025-03-11 RX ADMIN — Medication 150 MILLIGRAM(S): at 03:03

## 2025-03-11 RX ADMIN — ERYTHROMYCIN 1 APPLICATION(S): 5 OINTMENT OPHTHALMIC at 13:38

## 2025-03-11 RX ADMIN — HEPARIN SODIUM 5000 UNIT(S): 1000 INJECTION INTRAVENOUS; SUBCUTANEOUS at 05:59

## 2025-03-11 RX ADMIN — Medication 10 MILLIGRAM(S): at 11:57

## 2025-03-11 RX ADMIN — ERYTHROMYCIN 1 APPLICATION(S): 5 OINTMENT OPHTHALMIC at 01:02

## 2025-03-11 RX ADMIN — HEPARIN SODIUM 5000 UNIT(S): 1000 INJECTION INTRAVENOUS; SUBCUTANEOUS at 13:37

## 2025-03-11 RX ADMIN — Medication 1 SPRAY(S): at 17:13

## 2025-03-11 NOTE — PROGRESS NOTE ADULT - SUBJECTIVE AND OBJECTIVE BOX
HPI:  Unknown age male, unknown past medical history (reported stroke and MI by coworkers) presented to [facility] with AMS, Pt was working at [employer] when he was found down by coworkers. EMS called and pt brought to [facility] ED. Intubated, sedated, started on cardene for SBPs in 200s. CT head showed brain stem bleed. Transferred to NSICU for further management.  (30 Sep 2024 12:55)    OVERNIGHT EVENTS: GEORGE overnight, neuro stable.    Hospital Course:  Prior hospital course in chart.   3/4: GEORGE o/n. neuro stable. holding ativan PM and tomorrow AM doses per epilepsy. Keppra level 30.3, therapeutic.   3/5: Family mtg pending. Start Ativan 0.5mg qhs per epilepsy. Sent candida aureus swabs per epidemiology.   3/6: GEORGE o/n. EEG dc'd per epilepsy.   3/7: GEORGE o/n  3/8: GEORGE o/n.  3/9: GEORGE o/n.  3/10: GEORGE o/n. 1L LR given for rising Cr. SC for PVR >500cc. gabapentin level 18.8. Gabapentin changed to 300BID.   3/11: GEORGE o/n.       Vital Signs Last 24 Hrs  T(C): 36.3 (10 Mar 2025 22:02), Max: 36.6 (10 Mar 2025 08:58)  T(F): 97.4 (10 Mar 2025 22:02), Max: 97.8 (10 Mar 2025 08:58)  HR: 74 (10 Mar 2025 20:10) (66 - 80)  BP: 109/67 (10 Mar 2025 20:10) (103/66 - 127/92)  BP(mean): 84 (10 Mar 2025 20:10) (80 - 105)  RR: 16 (10 Mar 2025 20:10) (14 - 17)  SpO2: 96% (10 Mar 2025 20:10) (94% - 100%)    Parameters below as of 10 Mar 2025 20:10  Patient On (Oxygen Delivery Method): ventilator    O2 Concentration (%): 40    I&O's Summary    09 Mar 2025 07:01  -  10 Mar 2025 07:00  --------------------------------------------------------  IN: 1826 mL / OUT: 1175 mL / NET: 651 mL    10 Mar 2025 07:01  -  11 Mar 2025 00:19  --------------------------------------------------------  IN: 1109 mL / OUT: 1120 mL / NET: -11 mL      PHYSICAL EXAM:  Constitutional: NAD, lying in bed.  HEENT: Pupils equal, round, reactive to light.   Respiratory: +Trach to mechanical vent. No respiratory distress, symmetric chest rise  Cardiovascular: Regular rate and rhythm    Gastrointestinal: +PEG, Abdomen soft, nondistended.  Neurological:  AAOX0-1. Opens eyes. Tracks vertically  Motor: RUE squeezes hand to command, LUE 0/5, RLE wiggles toes spontaneously, LLE withdraws  Sensation: unable to assess  Extremities: Warm, well perfused.        TUBES/LINES:  [] Vuong  [] Lumbar Drain  [] Wound Drains  [] Others  [x] trach  [x] peg     DIET:  [] NPO  [] Mechanical  [x] Tube feeds    LABS:                        10.5   6.17  )-----------( 233      ( 10 Mar 2025 05:30 )             31.9     03-10    135  |  100  |  62[H]  ----------------------------<  114[H]  4.6   |  24  |  1.32[H]    Ca    9.4      10 Mar 2025 05:30  Phos  4.7     03-10  Mg     2.3     03-10        Urinalysis Basic - ( 10 Mar 2025 05:30 )    Color: x / Appearance: x / SG: x / pH: x  Gluc: 114 mg/dL / Ketone: x  / Bili: x / Urobili: x   Blood: x / Protein: x / Nitrite: x   Leuk Esterase: x / RBC: x / WBC x   Sq Epi: x / Non Sq Epi: x / Bacteria: x          CAPILLARY BLOOD GLUCOSE          Drug Levels: [] N/A    CSF Analysis: [] N/A      Allergies    Allergy Status Unknown    Intolerances      MEDICATIONS:  Antibiotics:    Neuro:  acetaminophen   Oral Liquid .. 650 milliGRAM(s) Oral every 6 hours PRN  gabapentin 300 milliGRAM(s) Oral every 12 hours  lacosamide 200 milliGRAM(s) Oral every 12 hours  levETIRAcetam 1500 milliGRAM(s) Oral every 12 hours  LORazepam   Injectable 2 milliGRAM(s) IV Push once PRN  phenytoin   Suspension 150 milliGRAM(s) Oral <User Schedule>  phenytoin   Suspension 200 milliGRAM(s) Oral <User Schedule>    Anticoagulation:  heparin   Injectable 5000 Unit(s) SubCutaneous every 8 hours    OTHER:  albuterol/ipratropium for Nebulization 3 milliLiter(s) Nebulizer every 4 hours  artificial tears (preservative free) Ophthalmic Solution 1 Drop(s) Both EYES every 4 hours  chlorhexidine 0.12% Liquid 15 milliLiter(s) Oral Mucosa every 12 hours  chlorhexidine 2% Cloths 1 Application(s) Topical <User Schedule>  doxazosin 4 milliGRAM(s) Oral at bedtime  erythromycin   Ointment 1 Application(s) Both EYES every 4 hours  fluticasone propionate 50 MICROgram(s)/spray Nasal Spray 1 Spray(s) Both Nostrils two times a day  influenza   Vaccine 0.5 milliLiter(s) IntraMuscular once  metoprolol tartrate 12.5 milliGRAM(s) Oral every 12 hours  pantoprazole   Suspension 40 milliGRAM(s) Oral daily  petrolatum Ophthalmic Ointment 1 Application(s) Both EYES every 4 hours  polyethylene glycol 3350 17 Gram(s) Oral daily  senna 2 Tablet(s) Oral at bedtime  sodium chloride 0.65% Nasal 1 Spray(s) Both Nostrils two times a day    IVF:    CULTURES:    RADIOLOGY & ADDITIONAL TESTS:      ASSESSMENT:  44M PMH ?stroke/MI present to [facility] after collapsing at work. Decorticate posturing, vomiting, intubated for airway protection. 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 Transferred to [facility] for further management. s/p trach 10/14. s/p peg 10/21. Re-upgrade to ICU 2/2 desaturation event and suctioning requirements 11/15. Re-upgrade to NSICU 12/15 2/2 desaturation and tachycardia.      AMS    Intubate    Handoff    MEWS Score    Acute myocardial infarction    CVA (cerebral vascular accident)    Intracerebral hemorrhage of brain stem    Brainstem stroke    Brain stem stroke syndrome    Brain stem hemorrhage    Brain stem stroke syndrome    Percutaneous tracheal puncture    Hemorrhagic stroke    Brainstem stroke    Pontine hemorrhage    Brainstem stroke    Encephalopathy acute    Functional quadriplegia    Advanced care planning/counseling discussion    Encounter for palliative care    Pontine hemorrhage    Neurogenic dysphagia    Chronic respiratory failure    Acute kidney injury superimposed on CKD    Acute urinary retention    Hypertensive emergency    Sepsis, unspecified organism    Sepsis    Gram-negative bacteremia    Dyspnea    Generalized pain    At risk for pain    Percutaneous tracheal puncture    Altered mental status examination    EGD, with PEG    AMS    Room Service Assist    EGD, with PEG    Functional quadriplegia    Neurogenic dysphagia    Chronic respiratory failure    Encounter for palliative care    Acute urinary retention    Hypertension    Seizures    [email or web address]        PLAN:    Neuro:  - neuro/vitals q4h  - pain control: tylenol prn  - seizure tx: keppra 1500mg BID, phenytoin 150/150/200, vimpat 200mg BID, gabapentin 300 mg qq12  - s/p vEEG (3/1- 3/6), s/p vEEG: +seizure on 2/17, 3/1  - CTH 9/30: enlarged pontine hemorrhage, CTH 10/3: stable, CTH 10/25: mostly resolved pontine hemorrhage, CTH 12/15: R mastoid air cell opacification; acute otitis media vs sterile effusion, CTH 12/18: stable. CTH 1/21 stable, CTH 1/27 stable, CTH 3/1: stable   - MRI brain 10/12: parenchymal hemorrhage, acute/subacute R cerebellar stroke      CV:  - -160  - HTN/tachycardia: Metoprolol 12.5 mg BID   - echo (9/30) EF 75%, repeat 12/16: 57%  - UT 3/3: 172    PULM:  - s/p trach exchange with pulm at bedside 2/14 to vent, AC/VC 40/400/14/6  - Secretions: duonebs/chest PT q4h  - CT chest 2/17: Near complete collapse b/l lower lobes. Patchy opacity within LLL/ALONDRA  - pulmonology consulted, recs appreciated    GI:  - TFs via PEG, [facility] renal  - bowel regimen, last BM 3/1    Renal:  - IVL; FW 200q6 for hydration  - Voiding, SC prn  - CKD: trend BUN/Cr  - renal US 10/1, 10/8, 1/15: Increased b/l renal echogenicity c/w medical renal disease  - urinary retention: doxazosin 4mg at bedtime     Endo:  - A1c 5.4    Heme:  - DVT ppx: SCDs, SQH 5000u q8h   - LE dopplers negative 12/16    ID:  - Afebrile, last pancx 2/17  - sputum 2/16 +Enterobacter cloacae: s/p Ertapenem (2/19-23) per ID recs, conolonized with stenotrophomonas  - empiric PNA: cefepime (12/22-12/30), s/p vanc (12/22-12/23), s/p zosyn (12/15-12/20), s/p vanc (12/15-12/16), s/p zosyn (1/12 -1/18), s/p DS bactrim TID, minocycline BID (2/18)  - Hx +klebsiella UTI & bacteremia, +stenotrophomopnas maltophilia PNA, +Enterobacter cloacae PNA     MISC:  - BL keratitis: BL erythromycin ointment, artificial tears, lacrilube q4, moisture chamber   - Penile wound: wound care rec barrier wipes     Dispo: SDU status, DNR, pending SNF    D/w Dr. D'Amico

## 2025-03-11 NOTE — CHART NOTE - NSCHARTNOTEFT_GEN_A_CORE
Admitting Diagnosis:   Patient is a 44y old  Male who presents with a chief complaint of brain stem bleed (11 Mar 2025 00:19)  PAST MEDICAL & SURGICAL HISTORY:  Acute myocardial infarction  CVA (cerebral vascular accident)      Current Nutrition Order: Maribeth Sauceda renal 1.8 formula via gastrostomy: at goal of 59mL/hour x 18 hours; this provides ~1062mL total volume, 1912 calories, ~85g protein, ~701mL free water; this meets ~% calorie needs, % of protein needs    PO Intake: N/A related to NPO status with tube feedings     GI Issues: last BM 3/10/25 per nursing (fecal incontinence), formed stool noted by nurse. Abdomen soft/nontender, nondistended per medical team. Pt is ordered for a bowel regimen. No major GI upset noted.     Pain: No non-verbal indicators of pain noted in flowsheets and per nursing.       03-10-25 @ 07:01  -  03-11-25 @ 07:00  --------------------------------------------------------  IN: 2041 mL / OUT: 1951 mL / NET: 90 mL    03-11-25 @ 07:01  -  03-11-25 @ 13:08  --------------------------------------------------------  IN: 437 mL / OUT: 150 mL / NET: 287 mL      Labs:   03-11    137  |  100  |  59[H]  ----------------------------<  147[H]  4.3   |  21[L]  |  1.21    Ca    9.2      11 Mar 2025 05:30  Phos  4.9     03-11  Mg     2.3     03-11    TPro  7.2  /  Alb  3.3  /  TBili  <0.2  /  DBili  <0.1  /  AST  12  /  ALT  11  /  AlkPhos  197[H]  03-11    CAPILLARY BLOOD GLUCOSE    Medications:  MEDICATIONS  (STANDING):  albuterol/ipratropium for Nebulization 3 milliLiter(s) Nebulizer every 4 hours  artificial tears (preservative free) Ophthalmic Solution 1 Drop(s) Both EYES every 4 hours  chlorhexidine 0.12% Liquid 15 milliLiter(s) Oral Mucosa every 12 hours  chlorhexidine 2% Cloths 1 Application(s) Topical <User Schedule>  doxazosin 4 milliGRAM(s) Oral at bedtime  erythromycin   Ointment 1 Application(s) Both EYES every 4 hours  fluticasone propionate 50 MICROgram(s)/spray Nasal Spray 1 Spray(s) Both Nostrils two times a day  gabapentin 300 milliGRAM(s) Oral every 12 hours  heparin   Injectable 5000 Unit(s) SubCutaneous every 8 hours  influenza   Vaccine 0.5 milliLiter(s) IntraMuscular once  lacosamide 200 milliGRAM(s) Oral every 12 hours  levETIRAcetam 1500 milliGRAM(s) Oral every 12 hours  metoprolol tartrate 12.5 milliGRAM(s) Oral every 12 hours  pantoprazole   Suspension 40 milliGRAM(s) Oral daily  petrolatum Ophthalmic Ointment 1 Application(s) Both EYES every 4 hours  phenytoin   Suspension 150 milliGRAM(s) Oral <User Schedule>  phenytoin   Suspension 200 milliGRAM(s) Oral <User Schedule>  polyethylene glycol 3350 17 Gram(s) Oral daily  senna 2 Tablet(s) Oral at bedtime  sodium chloride 0.65% Nasal 1 Spray(s) Both Nostrils two times a day    MEDICATIONS  (PRN):  acetaminophen   Oral Liquid .. 650 milliGRAM(s) Oral every 6 hours PRN Temp greater or equal to 38C (100.4F), Mild Pain (1 - 3)  LORazepam   Injectable 2 milliGRAM(s) IV Push once PRN Seizure Activity (NOTIFY PROVIDER PRIOR TO GIVING)    Dosing Anthropometrics:   Height for BMI (FEET)	5 Feet  Height for BMI (INCHES)	8 Inch(s)  Height for BMI (CM)	172.72 Centimeter(s)  Weight for BMI (lbs)	176.4 lb  Weight for BMI (kg)	80 kg  Body Mass Index	              26.8  ideal body weight:               154 pounds / 70 kg   % ideal body weight             114%     Weight Change: Recommend nursing to obtain weekly wt to track/trend changes.   RD obtained bedweight today (1/23/25) of ~79.5kg, consistent with admission weight. RD to follow up with nursing.   RD obtained bedweight today (2/4/25) of ~76.1kg, consistent with admission weight. RD to follow up with nursing.   RD obtained bedweight today (2/11/25) of ~76.2kg, not significantly decreased compared to admission wt.  RD obtained bedweight today (3/4/25) of ~77kg, not significantly different compared to admission wt.    Estimated energy needs:   Weight used for calculations: most recent actual body weight  weight: 76.1kg (~167%)  calories: 25-30 calories/kg = ~0811-5401 calories/day  protein: 1.1-1.4g protein/kg = ~84-107g protein/day  fluids: 1mL/kcal = ~1903-2283mL/day  Other Calculations	Pt is ~% ideal body weight (~109%), thus actual body weight used for all calculations. Needs adjusted for age, clinical course, ventilation.     Subjective:   This is a 44 year old male, unknown past medical history (reported stroke and MI by coworkers) presented to [facility] with AMS, Pt was working at [employer] when he was found down by coworkers. EMS called and pt brought to [facility] ED. Intubated, sedated, started on cardene for SBPs in 200s. CT head showed brain stem bleed. Transferred to NSICU for further management. Pt is status post tracheostomy 10/14. Pt is status post PEG on 10/21. Re-upgrade to ICU in the setting of desaturation event and suctioning requirements 11/15. Re-upgrade to NSICU 12/15 2/2 desaturation and tachycardia. Pt has since been stepped down to [facility].     Patient seen at bedside on 8 [facility] for nutrition follow up. Pt afebrile, vented to VC-AC, SpO2 %, not sedated, not on pressors. Pt lethargic and somnolent, "unable to speak" per nursing. RD spoke to nursing on the unit, pt was resting in bed. Current diet order: NPO with tube feeds of OpenROV Renal formula, at a goal rate of 59mL/hour for 18 hours, pt is meeting ~100% estimated nutrient needs. GI within normal limits at this time. Labs and medications reviewed. Notable labs 3/11/25: BUN 59 (high),  (high), phosphorus high (4.9), other labs within normal limits, medical team is aware, RD to monitor trends. RD will continue to follow, please see nutrition recommendations below.     Previous Nutrition Diagnosis: Inadequate Oral Intake related to inability to meet nutritional demands via PO as evidenced by NPO with tube feedings    Active [ x ]  Resolved [   ]     Goal: Pt will consume >/= 75% of estimated nutrient needs via tolerated route     Nutrition Recommendations:  1. Continue OpenROV renal via gastrostomy, goal of 59mL/hour x 18 hours, providing 1062mL total volume, 1912 calories, 85g protein, ~701mL free water; this meets ~25 calories/kg actual body weight, ~1.12g protein/kg actual body weight**  **Maintain aspiration precautions at all times; monitor for signs/symptoms of intolerance**  2. Monitor weights (weekly), GI function, skin integrity; electrolytes, BMP, glucose, CBC per MD discretion *renal indices*  3. Pain and bowel regimen per medical team  4. Recommend nursing to obtain new weights to track/trend changes  5. Align nutrition with goals of care at all times    Education: Deferred at this time related to pt resting in bed, and cognitive status.     Risk Level: High [  ] Moderate [ x ] Low [   ]. Admitting Diagnosis:   Patient is a 44y old  Male who presents with a chief complaint of brain stem bleed (11 Mar 2025 00:19)  PAST MEDICAL & SURGICAL HISTORY:  Acute myocardial infarction  CVA (cerebral vascular accident)      Current Nutrition Order: Maribeth Sauceda renal 1.8 formula via gastrostomy: at goal of 59mL/hour x 18 hours; this provides ~1062mL total volume, 1912 calories, ~85g protein, ~701mL free water; this meets ~% calorie needs, % of protein needs    PO Intake: N/A related to NPO status with tube feedings     GI Issues: last BM 3/10/25 per nursing (fecal incontinence), formed stool noted by nurse. Abdomen soft/nontender, nondistended per medical team. Pt is ordered for a bowel regimen. No major GI upset noted.     Pain: No non-verbal indicators of pain noted in flowsheets and per nursing.       03-10-25 @ 07:01  -  03-11-25 @ 07:00  --------------------------------------------------------  IN: 2041 mL / OUT: 1951 mL / NET: 90 mL    03-11-25 @ 07:01  -  03-11-25 @ 13:08  --------------------------------------------------------  IN: 437 mL / OUT: 150 mL / NET: 287 mL      Labs:   03-11    137  |  100  |  59[H]  ----------------------------<  147[H]  4.3   |  21[L]  |  1.21    Ca    9.2      11 Mar 2025 05:30  Phos  4.9     03-11  Mg     2.3     03-11    TPro  7.2  /  Alb  3.3  /  TBili  <0.2  /  DBili  <0.1  /  AST  12  /  ALT  11  /  AlkPhos  197[H]  03-11    CAPILLARY BLOOD GLUCOSE    Medications:  MEDICATIONS  (STANDING):  albuterol/ipratropium for Nebulization 3 milliLiter(s) Nebulizer every 4 hours  artificial tears (preservative free) Ophthalmic Solution 1 Drop(s) Both EYES every 4 hours  chlorhexidine 0.12% Liquid 15 milliLiter(s) Oral Mucosa every 12 hours  chlorhexidine 2% Cloths 1 Application(s) Topical <User Schedule>  doxazosin 4 milliGRAM(s) Oral at bedtime  erythromycin   Ointment 1 Application(s) Both EYES every 4 hours  fluticasone propionate 50 MICROgram(s)/spray Nasal Spray 1 Spray(s) Both Nostrils two times a day  gabapentin 300 milliGRAM(s) Oral every 12 hours  heparin   Injectable 5000 Unit(s) SubCutaneous every 8 hours  influenza   Vaccine 0.5 milliLiter(s) IntraMuscular once  lacosamide 200 milliGRAM(s) Oral every 12 hours  levETIRAcetam 1500 milliGRAM(s) Oral every 12 hours  metoprolol tartrate 12.5 milliGRAM(s) Oral every 12 hours  pantoprazole   Suspension 40 milliGRAM(s) Oral daily  petrolatum Ophthalmic Ointment 1 Application(s) Both EYES every 4 hours  phenytoin   Suspension 150 milliGRAM(s) Oral <User Schedule>  phenytoin   Suspension 200 milliGRAM(s) Oral <User Schedule>  polyethylene glycol 3350 17 Gram(s) Oral daily  senna 2 Tablet(s) Oral at bedtime  sodium chloride 0.65% Nasal 1 Spray(s) Both Nostrils two times a day    MEDICATIONS  (PRN):  acetaminophen   Oral Liquid .. 650 milliGRAM(s) Oral every 6 hours PRN Temp greater or equal to 38C (100.4F), Mild Pain (1 - 3)  LORazepam   Injectable 2 milliGRAM(s) IV Push once PRN Seizure Activity (NOTIFY PROVIDER PRIOR TO GIVING)    Dosing Anthropometrics:   Height for BMI (FEET)	5 Feet  Height for BMI (INCHES)	8 Inch(s)  Height for BMI (CM)	172.72 Centimeter(s)  Weight for BMI (lbs)	176.4 lb  Weight for BMI (kg)	80 kg  Body Mass Index	              26.8  ideal body weight:               154 pounds / 70 kg   % ideal body weight             114%     Weight Change: Recommend nursing to obtain weekly wt to track/trend changes.   RD obtained bedweight today (1/23/25) of ~79.5kg, consistent with admission weight. RD to follow up with nursing.   RD obtained bedweight today (2/4/25) of ~76.1kg, consistent with admission weight. RD to follow up with nursing.   RD obtained bedweight today (2/11/25) of ~76.2kg, not significantly decreased compared to admission wt.  RD obtained bedweight today (3/4/25) of ~77kg, not significantly different compared to admission wt.  RD obtained bedweight today 3/11/25 of ~79kg, +2kg since previous week, RD to ensure that nursing obtains updated weights to ensure accuracy.    Estimated energy needs:   Weight used for calculations: most recent actual body weight  weight: 76.1kg (~167%)  calories: 25-30 calories/kg = ~6491-5222 calories/day  protein: 1.1-1.4g protein/kg = ~84-107g protein/day  fluids: 1mL/kcal = ~1903-2283mL/day  Other Calculations	Pt is ~% ideal body weight (~109%), thus actual body weight used for all calculations. Needs adjusted for age, clinical course, ventilation.     Subjective:   This is a 44 year old male, unknown past medical history (reported stroke and MI by coworkers) presented to [facility] with AMS, Pt was working at [email or web address] when he was found down by coworkers. EMS called and pt brought to [facility] ED. Intubated, sedated, started on cardene for SBPs in 200s. CT head showed brain stem bleed. Transferred to NSICU for further management. Pt is status post tracheostomy 10/14. Pt is status post PEG on 10/21. Re-upgrade to ICU in the setting of desaturation event and suctioning requirements 11/15. Re-upgrade to NSICU 12/15 2/2 desaturation and tachycardia. Pt has since been stepped down to [facility].     Patient seen at bedside on 8 [facility] for nutrition follow up. Pt afebrile, vented to VC-AC, SpO2 %, not sedated, not on pressors. Pt lethargic and somnolent, "unable to speak" per nursing. RD spoke to nursing on the unit, pt was resting in bed. Current diet order: NPO with tube feeds of Twitch Renal formula, at a goal rate of 59mL/hour for 18 hours, pt is meeting ~100% estimated nutrient needs. GI within normal limits at this time. Labs and medications reviewed. Notable labs 3/11/25: BUN 59 (high),  (high), phosphorus high (4.9), other labs within normal limits, medical team is aware, RD to monitor trends. RD will continue to follow, please see nutrition recommendations below.     Previous Nutrition Diagnosis: Inadequate Oral Intake related to inability to meet nutritional demands via PO as evidenced by NPO with tube feedings    Active [ x ]  Resolved [   ]     Goal: Pt will consume >/= 75% of estimated nutrient needs via tolerated route     Nutrition Recommendations:  1. Continue Maribeth Farms renal via gastrostomy, goal of 59mL/hour x 18 hours, providing 1062mL total volume, 1912 calories, 85g protein, ~701mL free water; this meets ~25 calories/kg actual body weight, ~1.12g protein/kg actual body weight**  **Maintain aspiration precautions at all times; monitor for signs/symptoms of intolerance**  2. Monitor weights (weekly), GI function, skin integrity; electrolytes, BMP, glucose, CBC per MD discretion *renal indices*  3. Pain and bowel regimen per medical team  4. Recommend nursing to obtain new weights to track/trend changes  5. Align nutrition with goals of care at all times    Education: Deferred at this time related to pt resting in bed, and cognitive status.     Risk Level: High [  ] Moderate [ x ] Low [   ].

## 2025-03-11 NOTE — PROGRESS NOTE ADULT - SUBJECTIVE AND OBJECTIVE BOX
SUBJECTIVE:  Patient was seen and examined at bedside. Patient lethargic, looking comfortable, no labored breathing on vent. No other complaints or events reported.    Review of systems: unable to obtain     Diet, NPO with Tube Feed:   Tube Feeding Modality: Gastrostomy  Maribeth Sauceda Renal  Total Volume for 24 Hours (mL): 1062  Total Number of Cans: 5  Continuous  Starting Tube Feed Rate mL per Hour: 59  Increase Tube Feed Rate by (mL): 0     Every 4 hours  Until Goal Tube Feed Rate (mL per Hour): 59  Tube Feed Duration (in Hours): 18  Tube Feed Start Time: 11:00  Tube Feed Stop Time: 05:00 (02-20-25 @ 10:42) [Active]      MEDICATIONS:  MEDICATIONS  (STANDING):  albuterol/ipratropium for Nebulization 3 milliLiter(s) Nebulizer every 4 hours  artificial tears (preservative free) Ophthalmic Solution 1 Drop(s) Both EYES every 4 hours  chlorhexidine 0.12% Liquid 15 milliLiter(s) Oral Mucosa every 12 hours  chlorhexidine 2% Cloths 1 Application(s) Topical <User Schedule>  doxazosin 4 milliGRAM(s) Oral at bedtime  erythromycin   Ointment 1 Application(s) Both EYES every 4 hours  fluticasone propionate 50 MICROgram(s)/spray Nasal Spray 1 Spray(s) Both Nostrils two times a day  gabapentin 300 milliGRAM(s) Oral every 12 hours  heparin   Injectable 5000 Unit(s) SubCutaneous every 8 hours  influenza   Vaccine 0.5 milliLiter(s) IntraMuscular once  lacosamide 200 milliGRAM(s) Oral every 12 hours  levETIRAcetam 1500 milliGRAM(s) Oral every 12 hours  metoprolol tartrate 12.5 milliGRAM(s) Oral every 12 hours  pantoprazole   Suspension 40 milliGRAM(s) Oral daily  petrolatum Ophthalmic Ointment 1 Application(s) Both EYES every 4 hours  phenytoin   Suspension 150 milliGRAM(s) Oral <User Schedule>  phenytoin   Suspension 200 milliGRAM(s) Oral <User Schedule>  polyethylene glycol 3350 17 Gram(s) Oral daily  senna 2 Tablet(s) Oral at bedtime  sodium chloride 0.65% Nasal 1 Spray(s) Both Nostrils two times a day    MEDICATIONS  (PRN):  acetaminophen   Oral Liquid .. 650 milliGRAM(s) Oral every 6 hours PRN Temp greater or equal to 38C (100.4F), Mild Pain (1 - 3)  LORazepam   Injectable 2 milliGRAM(s) IV Push once PRN Seizure Activity (NOTIFY PROVIDER PRIOR TO GIVING)      Allergies    Allergy Status Unknown    Intolerances        OBJECTIVE:  Vital Signs Last 24 Hrs  T(C): 36.6 (11 Mar 2025 14:00), Max: 36.7 (11 Mar 2025 08:50)  T(F): 97.9 (11 Mar 2025 14:00), Max: 98 (11 Mar 2025 08:50)  HR: 88 (11 Mar 2025 12:20) (66 - 90)  BP: 112/74 (11 Mar 2025 12:09) (109/67 - 133/93)  BP(mean): 90 (11 Mar 2025 12:09) (84 - 109)  RR: 17 (11 Mar 2025 12:20) (14 - 20)  SpO2: 100% (11 Mar 2025 12:20) (94% - 100%)    Parameters below as of 11 Mar 2025 12:20  Patient On (Oxygen Delivery Method): ventilator    O2 Concentration (%): 40  I&O's Summary    10 Mar 2025 07:01  -  11 Mar 2025 07:00  --------------------------------------------------------  IN: 2041 mL / OUT: 1951 mL / NET: 90 mL    11 Mar 2025 07:01  -  11 Mar 2025 14:26  --------------------------------------------------------  IN: 437 mL / OUT: 640 mL / NET: -203 mL        PHYSICAL EXAM:  General: lethargic, NAD, no labored breathing on vent  HEENT: facial twitching, trach in place, clean  Lungs: limited, no crackles, no wheezes  Heart: regular  Abdomen: soft, no visible tenderness  Extremities:  warm, no edema, no visible tenderness, not following commands         LABS:                        10.7   8.81  )-----------( 213      ( 11 Mar 2025 05:30 )             32.7     03-11    137  |  100  |  59[H]  ----------------------------<  147[H]  4.3   |  21[L]  |  1.21    Ca    9.2      11 Mar 2025 05:30  Phos  4.9     03-11  Mg     2.3     03-11    TPro  7.2  /  Alb  3.3  /  TBili  <0.2  /  DBili  <0.1  /  AST  12  /  ALT  11  /  AlkPhos  197[H]  03-11    LIVER FUNCTIONS - ( 11 Mar 2025 05:30 )  Alb: 3.3 g/dL / Pro: 7.2 g/dL / ALK PHOS: 197 U/L / ALT: 11 U/L / AST: 12 U/L / GGT: x             CAPILLARY BLOOD GLUCOSE        Urinalysis Basic - ( 11 Mar 2025 05:30 )    Color: x / Appearance: x / SG: x / pH: x  Gluc: 147 mg/dL / Ketone: x  / Bili: x / Urobili: x   Blood: x / Protein: x / Nitrite: x   Leuk Esterase: x / RBC: x / WBC x   Sq Epi: x / Non Sq Epi: x / Bacteria: x        MICRODATA:      RADIOLOGY/OTHER STUDIES:

## 2025-03-11 NOTE — PROGRESS NOTE ADULT - ASSESSMENT
44M with unclear PMH (?stroke, MI) who was found down at work, intubated for airway protection and found to have acute parenchymal hemorrhage within nabil with mass effect (+ acute/subacute right cerebellar infarct) in setting of hypertensive emergency, transferred to [facility] for further neurosurgical care. Hospital course c/b poor neurologic recovery s/p trach-PEG, AUR s/p gabriel, ANIKA on CKD c/b hyperkalemia, HAP s/p amp-sulbactam (EOT 10/23), K aerogenes bacteremia  treated with 2 weeks of Cefepime per ID , On 11/15 he became septic and was transferred to NSICU due to increased o2 requirements and needed Vent support , Trach Cultures + for Stenotrophomonas which was treated with 7 days of Bactrim per ID , has been weaned of Vent since 11/23 and is now on 10lts at 40% o2 through Trach Collar. Stepped up to the NSICU on 12/15 for hypoxia and tachycardia. Pt s/p  abx for 5 days. Pt now stepped down to 8Lach.    # Pontine hemorrhage  # Encephalopathy due to Intracranial Bleed   #Trach, Vent and PEG Dependent  - Acute parenchymal hemorrhage within nabil with mass effect (+ acute/subacute right cerebellar infarct) in setting of hypertensive emergency.   - MRI brain also demonstrated acute/subacute R cerebellar stroke.   - No Neurosurgical Interventions were performed     # ANIKA on CKD stage IV  - Creatinine plateaued, monitor UOP. Bladder scan q6h, continue free water  - Will need to adjust medications to Gfr and Epilepsy recs   - Continue Free water via PEG @ 200q6h  - Continue to monitor creatinine, avoid nephrotoxics     #Fever   -Afebrile, continue to monitor    # Seizures  - Continue Levetiracetam 1500 mg q12h and Vimpat 200 mg q12h.  -Continue Phenytoin 150mg qAM, qNoon, and 200 mg qHS   -Continue  Gabapentin 300 mg q8h started; Will f/u gabapentin level   -Epilepsy team following  - Seizure precautions     # Hypertension  -Continue Metoprolol 12.5mg q12     # Acute on Chronic respiratory failure   - Continue vent support and chest PT    # Neurogenic dysphagia.   - s/p PEG placement by surgery 10/21/24  - Continue TF per nutrition  - Continue aspiration precautions and elevation of HOB.    # Urinary retention  # BPH   - doxazosin 4mg qHS   - [place]/Cond[place] Catheter     # Functional quadriplegia in setting of brainstem hemorrhage  - decub precautions  - care per nursing protocol     # Anemia of Chronic Disease   -  monitor H/H stable ~10-11    #DVT ppx  -Heparin SQ TID     #DISPO  -Palliative and Ethics following

## 2025-03-12 LAB
ANION GAP SERPL CALC-SCNC: 13 MMOL/L — SIGNIFICANT CHANGE UP (ref 5–17)
BUN SERPL-MCNC: 53 MG/DL — HIGH (ref 7–23)
CALCIUM SERPL-MCNC: 9.2 MG/DL — SIGNIFICANT CHANGE UP (ref 8.4–10.5)
CHLORIDE SERPL-SCNC: 101 MMOL/L — SIGNIFICANT CHANGE UP (ref 96–108)
CO2 SERPL-SCNC: 25 MMOL/L — SIGNIFICANT CHANGE UP (ref 22–31)
CREAT SERPL-MCNC: 1.12 MG/DL — SIGNIFICANT CHANGE UP (ref 0.5–1.3)
EGFR: 82 ML/MIN/1.73M2 — SIGNIFICANT CHANGE UP
EGFR: 82 ML/MIN/1.73M2 — SIGNIFICANT CHANGE UP
GLUCOSE SERPL-MCNC: 117 MG/DL — HIGH (ref 70–99)
HCT VFR BLD CALC: 28.3 % — LOW (ref 39–50)
HGB BLD-MCNC: 9.3 G/DL — LOW (ref 13–17)
MAGNESIUM SERPL-MCNC: 2.4 MG/DL — SIGNIFICANT CHANGE UP (ref 1.6–2.6)
MCHC RBC-ENTMCNC: 32.3 PG — SIGNIFICANT CHANGE UP (ref 27–34)
MCHC RBC-ENTMCNC: 32.9 G/DL — SIGNIFICANT CHANGE UP (ref 32–36)
MCV RBC AUTO: 98.3 FL — SIGNIFICANT CHANGE UP (ref 80–100)
NRBC BLD AUTO-RTO: 0 /100 WBCS — SIGNIFICANT CHANGE UP (ref 0–0)
PHOSPHATE SERPL-MCNC: 4.4 MG/DL — SIGNIFICANT CHANGE UP (ref 2.5–4.5)
PLATELET # BLD AUTO: 177 K/UL — SIGNIFICANT CHANGE UP (ref 150–400)
POTASSIUM SERPL-MCNC: 4.7 MMOL/L — SIGNIFICANT CHANGE UP (ref 3.5–5.3)
POTASSIUM SERPL-SCNC: 4.7 MMOL/L — SIGNIFICANT CHANGE UP (ref 3.5–5.3)
RBC # BLD: 2.88 M/UL — LOW (ref 4.2–5.8)
RBC # FLD: 12.7 % — SIGNIFICANT CHANGE UP (ref 10.3–14.5)
SODIUM SERPL-SCNC: 139 MMOL/L — SIGNIFICANT CHANGE UP (ref 135–145)
WBC # BLD: 7.35 K/UL — SIGNIFICANT CHANGE UP (ref 3.8–10.5)
WBC # FLD AUTO: 7.35 K/UL — SIGNIFICANT CHANGE UP (ref 3.8–10.5)

## 2025-03-12 PROCEDURE — 99233 SBSQ HOSP IP/OBS HIGH 50: CPT

## 2025-03-12 PROCEDURE — 99231 SBSQ HOSP IP/OBS SF/LOW 25: CPT

## 2025-03-12 RX ORDER — BISACODYL 5 MG
10 TABLET, DELAYED RELEASE (ENTERIC COATED) ORAL DAILY
Refills: 0 | Status: DISCONTINUED | OUTPATIENT
Start: 2025-03-12 | End: 2025-03-13

## 2025-03-12 RX ADMIN — Medication 1 DROP(S): at 05:38

## 2025-03-12 RX ADMIN — HEPARIN SODIUM 5000 UNIT(S): 1000 INJECTION INTRAVENOUS; SUBCUTANEOUS at 21:12

## 2025-03-12 RX ADMIN — LEVETIRACETAM 1500 MILLIGRAM(S): 10 INJECTION, SOLUTION INTRAVENOUS at 18:30

## 2025-03-12 RX ADMIN — ERYTHROMYCIN 1 APPLICATION(S): 5 OINTMENT OPHTHALMIC at 18:29

## 2025-03-12 RX ADMIN — LACOSAMIDE 200 MILLIGRAM(S): 150 TABLET, FILM COATED ORAL at 05:38

## 2025-03-12 RX ADMIN — ERYTHROMYCIN 1 APPLICATION(S): 5 OINTMENT OPHTHALMIC at 21:13

## 2025-03-12 RX ADMIN — Medication 150 MILLIGRAM(S): at 12:13

## 2025-03-12 RX ADMIN — Medication 15 MILLILITER(S): at 18:29

## 2025-03-12 RX ADMIN — FLUTICASONE PROPIONATE 1 SPRAY(S): 50 SPRAY, METERED NASAL at 05:39

## 2025-03-12 RX ADMIN — IPRATROPIUM BROMIDE AND ALBUTEROL SULFATE 3 MILLILITER(S): .5; 2.5 SOLUTION RESPIRATORY (INHALATION) at 22:00

## 2025-03-12 RX ADMIN — Medication 10 MILLIGRAM(S): at 10:47

## 2025-03-12 RX ADMIN — IPRATROPIUM BROMIDE AND ALBUTEROL SULFATE 3 MILLILITER(S): .5; 2.5 SOLUTION RESPIRATORY (INHALATION) at 18:29

## 2025-03-12 RX ADMIN — Medication 2 TABLET(S): at 21:11

## 2025-03-12 RX ADMIN — IPRATROPIUM BROMIDE AND ALBUTEROL SULFATE 3 MILLILITER(S): .5; 2.5 SOLUTION RESPIRATORY (INHALATION) at 10:47

## 2025-03-12 RX ADMIN — Medication 1 DROP(S): at 13:57

## 2025-03-12 RX ADMIN — Medication 15 MILLILITER(S): at 05:38

## 2025-03-12 RX ADMIN — FLUTICASONE PROPIONATE 1 SPRAY(S): 50 SPRAY, METERED NASAL at 18:30

## 2025-03-12 RX ADMIN — Medication 40 MILLIGRAM(S): at 12:13

## 2025-03-12 RX ADMIN — METOPROLOL SUCCINATE 12.5 MILLIGRAM(S): 50 TABLET, EXTENDED RELEASE ORAL at 05:38

## 2025-03-12 RX ADMIN — POLYETHYLENE GLYCOL 3350 17 GRAM(S): 17 POWDER, FOR SOLUTION ORAL at 12:13

## 2025-03-12 RX ADMIN — ERYTHROMYCIN 1 APPLICATION(S): 5 OINTMENT OPHTHALMIC at 13:57

## 2025-03-12 RX ADMIN — ERYTHROMYCIN 1 APPLICATION(S): 5 OINTMENT OPHTHALMIC at 10:47

## 2025-03-12 RX ADMIN — Medication 200 MILLIGRAM(S): at 19:30

## 2025-03-12 RX ADMIN — LACOSAMIDE 200 MILLIGRAM(S): 150 TABLET, FILM COATED ORAL at 18:29

## 2025-03-12 RX ADMIN — GABAPENTIN 300 MILLIGRAM(S): 400 CAPSULE ORAL at 01:05

## 2025-03-12 RX ADMIN — Medication 1 DROP(S): at 10:48

## 2025-03-12 RX ADMIN — Medication 1 APPLICATION(S): at 21:13

## 2025-03-12 RX ADMIN — Medication 650 MILLIGRAM(S): at 10:46

## 2025-03-12 RX ADMIN — IPRATROPIUM BROMIDE AND ALBUTEROL SULFATE 3 MILLILITER(S): .5; 2.5 SOLUTION RESPIRATORY (INHALATION) at 05:38

## 2025-03-12 RX ADMIN — Medication 1 DROP(S): at 18:30

## 2025-03-12 RX ADMIN — ERYTHROMYCIN 1 APPLICATION(S): 5 OINTMENT OPHTHALMIC at 01:05

## 2025-03-12 RX ADMIN — Medication 1 DROP(S): at 21:13

## 2025-03-12 RX ADMIN — Medication 1 APPLICATION(S): at 13:56

## 2025-03-12 RX ADMIN — DOXAZOSIN MESYLATE 4 MILLIGRAM(S): 8 TABLET ORAL at 21:12

## 2025-03-12 RX ADMIN — HEPARIN SODIUM 5000 UNIT(S): 1000 INJECTION INTRAVENOUS; SUBCUTANEOUS at 13:56

## 2025-03-12 RX ADMIN — Medication 1 DROP(S): at 01:05

## 2025-03-12 RX ADMIN — Medication 650 MILLIGRAM(S): at 11:16

## 2025-03-12 RX ADMIN — Medication 1 SPRAY(S): at 05:39

## 2025-03-12 RX ADMIN — LEVETIRACETAM 1500 MILLIGRAM(S): 10 INJECTION, SOLUTION INTRAVENOUS at 05:38

## 2025-03-12 RX ADMIN — HEPARIN SODIUM 5000 UNIT(S): 1000 INJECTION INTRAVENOUS; SUBCUTANEOUS at 05:36

## 2025-03-12 RX ADMIN — IPRATROPIUM BROMIDE AND ALBUTEROL SULFATE 3 MILLILITER(S): .5; 2.5 SOLUTION RESPIRATORY (INHALATION) at 13:56

## 2025-03-12 RX ADMIN — Medication 1 APPLICATION(S): at 18:31

## 2025-03-12 RX ADMIN — Medication 1 SPRAY(S): at 18:30

## 2025-03-12 RX ADMIN — Medication 1 APPLICATION(S): at 05:39

## 2025-03-12 RX ADMIN — GABAPENTIN 300 MILLIGRAM(S): 400 CAPSULE ORAL at 13:56

## 2025-03-12 RX ADMIN — Medication 1 APPLICATION(S): at 10:47

## 2025-03-12 RX ADMIN — Medication 150 MILLIGRAM(S): at 03:59

## 2025-03-12 RX ADMIN — ERYTHROMYCIN 1 APPLICATION(S): 5 OINTMENT OPHTHALMIC at 05:39

## 2025-03-12 RX ADMIN — IPRATROPIUM BROMIDE AND ALBUTEROL SULFATE 3 MILLILITER(S): .5; 2.5 SOLUTION RESPIRATORY (INHALATION) at 01:05

## 2025-03-12 RX ADMIN — Medication 1 APPLICATION(S): at 01:05

## 2025-03-12 NOTE — PROVIDER CONTACT NOTE (OTHER) - NAME OF MD/NP/PA/DO NOTIFIED:
Juan Juany GREENE
Katie GREENE
Rina GREENE
Surgery Team 1
Faina GREENE
Freddy
RAMONA Guillaume
RAMONA Wade
Sho Lacy Neuro PA
Анна
MD Flores
RAMONA Lagos
RAMONA Murray
Sukhdeep Lagos
Katie GREENE
RAMONA Golden
neurosurgery robin Anderson
Charlee Huang
Faina GREENE
Lisa GREENE
MD Nabor Elizabeth
RAMONA Anderson

## 2025-03-12 NOTE — PROVIDER CONTACT NOTE (OTHER) - BACKGROUND
Had been difficult to arouse prior, only able to move LE. Unable to fully assess mental status
Pt admitted for AMS
Pt has been obtunded today at baseline, neuro team assessed pt in the morning and pt went for a CT scan of the head today which was stable
pt Admitted for AMS due to stroke
Patient admitted for AMS, brainstem bleed
Pt admitted for brainstem bleed, pt was alert and able to follow commands during morning assessment and was A&OX2
Pt had a bloody BM earlier today around 2pm, MD Huizar assessed pt at bedside
Pt has brainstem bleed
Pt retaining and straight cath performed x2 within the last 24hrs
brainstem hemorrhage; no surgical interventions
Patient admitted for brainstem bleed
Pt OOB to chair with PT- HR was in the 100-115 while working with PT.
Pt admitted for brainstem bleed, pt bradycardic today in the 50's, held propanolol as per order
pt admitted for AMS caused by stroke
43 y/o male brought in with AMS, CT head showed brain stem bleed. PEG, trach placed, on ventilator. Neuro status monitored.
Pt admitted for brainstem bleed, s/p trach and PEG
Pt admitted for further management of brain stem bleed
n/a
Pt admitted for brainstem bleed, s/p trach and PEG
Pt ordered for 5mg amlodipine and 10mg propanolol
Neuro pt here for brainstem hemorrhage.

## 2025-03-12 NOTE — PROVIDER CONTACT NOTE (OTHER) - RECOMMENDATIONS
Provider to assess
Contact provider, give antihypertensives.
Notify team
This RN called, informed Monica of neuro assessment findings and asked "can you come and assess Mr. Reyes? I am concerned about his neurological status."
n/a
Notify provider
Contact provider and have provider assess pt.
Notify neuro team for further instruction
Notify provider
RAMONA Anderson notified of change in status, will assess patient at bedside. Possible deep suctioning and oral suctioning with repositioning.
Contact provider
Contact provider to assess pt, draw labs, CT
Contact provider to assess pt.
Place a Vuong for the patient as it is his 3rd straight cath within 24 hrs.
Contact provider
Contact provider to assess patient, repeat CBC.
This RN asked RAMONA Berkowitz if this strays from neuro baseline. PA said that this is his baseline and he is on phenytoin and Keppra for seizure prophylaxis.

## 2025-03-12 NOTE — PROGRESS NOTE ADULT - ASSESSMENT
44M with unclear PMH (?stroke, MI) who was found down at work, intubated for airway protection and found to have acute parenchymal hemorrhage within nabil with mass effect (+ acute/subacute right cerebellar infarct) in setting of hypertensive emergency, transferred to [facility] for further neurosurgical care. Hospital course c/b poor neurologic recovery s/p trach-PEG, AUR s/p gabriel, ANIKA on CKD c/b hyperkalemia, HAP s/p amp-sulbactam (EOT 10/23), K aerogenes bacteremia  treated with 2 weeks of Cefepime per ID , On 11/15 he became septic and was transferred to NSICU due to increased o2 requirements and needed Vent support , Trach Cultures + for Stenotrophomonas which was treated with 7 days of Bactrim per ID , has been weaned of Vent since 11/23 and is now on 10lts at 40% o2 through Trach Collar. Stepped up to the NSICU on 12/15 for hypoxia and tachycardia. Pt s/p  abx for 5 days. Pt now stepped down to 8Lach.    # Pontine hemorrhage  # Encephalopathy due to Intracranial Bleed   #Trach, Vent and PEG Dependent  - Acute parenchymal hemorrhage within nabil with mass effect (+ acute/subacute right cerebellar infarct) in setting of hypertensive emergency.   - MRI brain also demonstrated acute/subacute R cerebellar stroke.   - No Neurosurgical Interventions were performed     # ANIKA on CKD stage IV  - Creatinine plateaued, monitor UOP. Bladder scan q6h, continue free water  - Will need to adjust medications to Gfr and Epilepsy recs   - Continue Free water via PEG @ 200q6h  - Continue to monitor creatinine, avoid nephrotoxics     # Seizures  - Continue Levetiracetam 1500 mg q12h and Vimpat 200 mg q12h.  -Continue Phenytoin 150mg qAM, qNoon, and 200 mg qHS   - Gabapentin decreased, follow-up level and adjust further   -Epilepsy team following  - Seizure precautions     # Hypertension  -Continue Metoprolol 12.5mg q12     # Acute on Chronic respiratory failure   - Continue vent support and chest PT    # Neurogenic dysphagia.   - s/p PEG placement by surgery 10/21/24  - Continue TF per nutrition  - Continue aspiration precautions and elevation of HOB.    # Urinary retention  # BPH   - doxazosin 4mg qHS   - [place] Catheter, bladder scan per protocol, requiring straight cath   - Patient having BM, can give another  bisacodyl suppository     # Functional quadriplegia in setting of brainstem hemorrhage  - decub precautions  - care per nursing protocol     # Anemia of Chronic Disease   -  monitor H/H stable ~10-11    #DVT ppx  -Heparin SQ TID     #DISPO  -Palliative and Ethics following

## 2025-03-12 NOTE — PROVIDER CONTACT NOTE (OTHER) - DATE AND TIME:
04-Jan-2025 04:44
22-Feb-2025 22:30
01-Mar-2025 08:43
16-Feb-2025 10:00
17-Dec-2024 13:00
19-Feb-2025 12:45
29-Dec-2024 12:37
07-Dec-2024 10:16
13-Feb-2025 19:00
20-Jan-2025 07:04
29-Dec-2024 10:36
30-Nov-2024 20:20
12-Jan-2025 12:28
18-Jan-2025 19:38
18-Feb-2025 09:50
12-Feb-2025 09:20
12-Jan-2025 10:08
13-Feb-2025 09:00
24-Nov-2024 20:45
01-Mar-2025 16:45
12-Mar-2025 12:30
18-Jan-2025 16:23

## 2025-03-12 NOTE — PROVIDER CONTACT NOTE (OTHER) - SITUATION
Pt has diastolic BP above parameters
Pt is diaphoretic and obtunded, only opens eyes to pain and repeated vigorous stimulation, withdraws extremities to pain
Pt is obtunded and only moves his lower right extremity to pain, other extremities have no movement
Pt is obtunded, opens eyes to vigorous stimulation and to pain, does not follow commands
Change in neuro status
notified provider of foul smelling urine that was emptied from gabriel catheter
Pt desatting to 87%,  BPM
Pt had a bloody BM
Pt retaining and straight cath performed x2 within the last 24hrs. Vuong order placed yesterday and was not completed. Requesting Vuong order and placement for the patient as he is retaining.
Pt right side facial twitching
BP @ 1618 152/102
Change in neuro exam
Start of shift head-to-toe assessment. Neuro check concern.
Pt HR jumping between 60-63
Pt became tachycardic with HR as high as 145.
Pt moving head, arms and leg uncontrollably
abrasion noted in the left eye and trach cuff inflated beyond parameters
trach exchange attempted but failed and pt blood pressure dropping post fentanyl 50 mcg
Patient originally admitted for brain stem bleed
Hematology called to advised hg increased from 10.8 to 14.0
No urine output
new onset of eyelid/lip twitching

## 2025-03-12 NOTE — PROVIDER CONTACT NOTE (OTHER) - ACTION/TREATMENT ORDERED:
Provider aware, re-assess in an hour
Provider to reach out to opthalmology for an eye c/s and pulmonology for a possible trach replacement
RAMONA Anderson responded "yes, i'll come over now. Can you get a fingerstick please?"
RAMONA Golden advised she will get back to me as to how to proceed. Continue to monitor.
RAMONA Wade at bedside, 4mg ativan ordered
Spoke w/ Lisa Kam who stated that pt waxes and wanes and is not concerned with neuro exam.
Sukhdeep Lagos advised to continue to monitor the patient. No interventions at this time.
Notified RAMONA Martinez who stated he is not concerned with pt's diastolic BP and that it's ok to hold the propanolol since pt is bradycardic.
RAMONA Trevino assessed pt and stated pt was able to follow commands but required a lot more stimulation compared to the morning assessment. Will continue to monitor.
Trach exhange to be attempted at bedside tomorrow again with higher level of sedation, bolus given to sustain blood pressure
Provider aware, will come assess patient at bedside
Deep suctioning performed at bedside, respiratory therapist Carmen will evaluate patient at bedside.
MD not concerned for seizure, possible electrolyte imbalance. Stat CMP ordered.
RAMONA Anderson came to assess patient for any changes
Spoke with Freddy from neurosurgery who stated that someone from neuro assessed the pt recently and pt was able to open his eyes. She stated that pt waxes and wanes and that CT was stable. EEG ordered.
no new orders at this time.
Pt given 1L NS bolus, IV tylenol, 2mg Ativan and 5mg Lopressor. Pt placed back to bed by RN and team.
RAMONA Huang assessed pt at bedside, labs drawn and sent, CT ordered.
Faina GREENE instructed RN to hold propanolol dose and administer amlodipine dose as ordered in the MAR.
Paged Team 1 twice and spok'd them as well. Waiting for a response.
Spoke w/ RAMONA Teran who stated she is not concerned, no intervention ordered.

## 2025-03-12 NOTE — PROGRESS NOTE ADULT - SUBJECTIVE AND OBJECTIVE BOX
SUBJECTIVE:  Patient was seen and examined at bedside. Patient unresponsive, looking comfortable on Vent. Required straight caths. No other complaints or events reported. Telemetry reviewed.     Review of systems: unable to obtain     Diet, NPO with Tube Feed:   Tube Feeding Modality: Gastrostomy  Maribeth Sauceda Renal  Total Volume for 24 Hours (mL): 1062  Total Number of Cans: 5  Continuous  Starting Tube Feed Rate mL per Hour: 59  Increase Tube Feed Rate by (mL): 0     Every 4 hours  Until Goal Tube Feed Rate (mL per Hour): 59  Tube Feed Duration (in Hours): 18  Tube Feed Start Time: 11:00  Tube Feed Stop Time: 05:00 (02-20-25 @ 10:42) [Active]      MEDICATIONS:  MEDICATIONS  (STANDING):  albuterol/ipratropium for Nebulization 3 milliLiter(s) Nebulizer every 4 hours  artificial tears (preservative free) Ophthalmic Solution 1 Drop(s) Both EYES every 4 hours  chlorhexidine 0.12% Liquid 15 milliLiter(s) Oral Mucosa every 12 hours  chlorhexidine 2% Cloths 1 Application(s) Topical <User Schedule>  doxazosin 4 milliGRAM(s) Oral at bedtime  erythromycin   Ointment 1 Application(s) Both EYES every 4 hours  fluticasone propionate 50 MICROgram(s)/spray Nasal Spray 1 Spray(s) Both Nostrils two times a day  gabapentin 300 milliGRAM(s) Oral every 12 hours  heparin   Injectable 5000 Unit(s) SubCutaneous every 8 hours  influenza   Vaccine 0.5 milliLiter(s) IntraMuscular once  lacosamide 200 milliGRAM(s) Oral every 12 hours  levETIRAcetam 1500 milliGRAM(s) Oral every 12 hours  metoprolol tartrate 12.5 milliGRAM(s) Oral every 12 hours  pantoprazole   Suspension 40 milliGRAM(s) Oral daily  petrolatum Ophthalmic Ointment 1 Application(s) Both EYES every 4 hours  phenytoin   Suspension 150 milliGRAM(s) Oral <User Schedule>  phenytoin   Suspension 200 milliGRAM(s) Oral <User Schedule>  polyethylene glycol 3350 17 Gram(s) Oral daily  senna 2 Tablet(s) Oral at bedtime  sodium chloride 0.65% Nasal 1 Spray(s) Both Nostrils two times a day    MEDICATIONS  (PRN):  acetaminophen   Oral Liquid .. 650 milliGRAM(s) Oral every 6 hours PRN Temp greater or equal to 38C (100.4F), Mild Pain (1 - 3)  LORazepam   Injectable 2 milliGRAM(s) IV Push once PRN Seizure Activity (NOTIFY PROVIDER PRIOR TO GIVING)      Allergies    Allergy Status Unknown    Intolerances        OBJECTIVE:  Vital Signs Last 24 Hrs  T(C): 36.8 (12 Mar 2025 09:00), Max: 36.8 (12 Mar 2025 09:00)  T(F): 98.3 (12 Mar 2025 09:00), Max: 98.3 (12 Mar 2025 09:00)  HR: 69 (12 Mar 2025 05:45) (69 - 88)  BP: 130/91 (12 Mar 2025 04:00) (100/64 - 130/91)  BP(mean): 105 (12 Mar 2025 04:00) (78 - 105)  RR: 14 (12 Mar 2025 05:45) (14 - 17)  SpO2: 100% (12 Mar 2025 05:45) (96% - 100%)    Parameters below as of 12 Mar 2025 05:45  Patient On (Oxygen Delivery Method): ventilator    O2 Concentration (%): 40  I&O's Summary    11 Mar 2025 07:01  -  12 Mar 2025 07:00  --------------------------------------------------------  IN: 2161 mL / OUT: 1770 mL / NET: 391 mL        PHYSICAL EXAM:  General: lethargic, NAD, no labored breathing on vent  HEENT: facial twitching, trach in place, clean  Lungs: limited, no crackles, no wheezes  Heart: regular  Abdomen: soft, no visible tenderness, no distension   Extremities:  warm, no edema, no visible tenderness, not following commands       LABS:                        9.3    7.35  )-----------( 177      ( 12 Mar 2025 05:30 )             28.3     03-12    139  |  101  |  53[H]  ----------------------------<  117[H]  4.7   |  25  |  1.12    Ca    9.2      12 Mar 2025 05:30  Phos  4.4     03-12  Mg     2.4     03-12    TPro  7.2  /  Alb  3.3  /  TBili  <0.2  /  DBili  <0.1  /  AST  12  /  ALT  11  /  AlkPhos  197[H]  03-11    LIVER FUNCTIONS - ( 11 Mar 2025 05:30 )  Alb: 3.3 g/dL / Pro: 7.2 g/dL / ALK PHOS: 197 U/L / ALT: 11 U/L / AST: 12 U/L / GGT: x             CAPILLARY BLOOD GLUCOSE        Urinalysis Basic - ( 12 Mar 2025 05:30 )    Color: x / Appearance: x / SG: x / pH: x  Gluc: 117 mg/dL / Ketone: x  / Bili: x / Urobili: x   Blood: x / Protein: x / Nitrite: x   Leuk Esterase: x / RBC: x / WBC x   Sq Epi: x / Non Sq Epi: x / Bacteria: x        MICRODATA:      RADIOLOGY/OTHER STUDIES:

## 2025-03-12 NOTE — PROGRESS NOTE ADULT - SUBJECTIVE AND OBJECTIVE BOX
HPI:  Unknown age male, unknown past medical history (reported stroke and MI by coworkers) presented to [facility] with AMS, Pt was working at [employer] when he was found down by coworkers. EMS called and pt brought to [facility] ED. Intubated, sedated, started on cardene for SBPs in 200s. CT head showed brain stem bleed. Transferred to NSICU for further management.  (30 Sep 2024 12:55)    OVERNIGHT EVENTS: GEORGE overnight, neuro stable.    Hospital Course:  Prior hospital course in chart.   3/3: GEORGE overnight. EEG still with focal discharges c/f seizure, added gabapentin 300 q8 as per epilepsy.   3/4: GEORGE o/n. neuro stable. holding ativan PM and tomorrow AM doses per epilepsy. Keppra level 30.3, therapeutic.   3/5: Family mtg pending. Start Ativan 0.5mg qhs per epilepsy. Sent candida aureus swabs per epidemiology.   3/6: GEORGE o/n. EEG dc'd per epilepsy.   3/7: GEORGE o/n  3/8: GEORGE o/n.  3/9: GEORGE o/n.  3/10: GEORGE o/n. 1L LR given for rising Cr. SC for PVR >500cc. gabapentin level 18.8. Gabapentin changed to 300BID.   3/11: GEORGE o/n.   3/12: GEORGE ovn.     Vital Signs Last 24 Hrs  T(C): 36.5 (11 Mar 2025 22:01), Max: 36.7 (11 Mar 2025 08:50)  T(F): 97.7 (11 Mar 2025 22:01), Max: 98 (11 Mar 2025 08:50)  HR: 74 (11 Mar 2025 20:40) (74 - 90)  BP: 100/64 (11 Mar 2025 20:20) (100/64 - 133/93)  BP(mean): 78 (11 Mar 2025 20:20) (78 - 109)  RR: 14 (11 Mar 2025 20:40) (14 - 20)  SpO2: 97% (11 Mar 2025 20:40) (96% - 100%)    Parameters below as of 11 Mar 2025 20:40  Patient On (Oxygen Delivery Method): ventilator    O2 Concentration (%): 40    I&O's Summary    10 Mar 2025 07:01  -  11 Mar 2025 07:00  --------------------------------------------------------  IN: 2041 mL / OUT: 1951 mL / NET: 90 mL    11 Mar 2025 07:01  -  12 Mar 2025 00:24  --------------------------------------------------------  IN: 1666 mL / OUT: 1370 mL / NET: 296 mL          PHYSICAL EXAM:  Constitutional: NAD, lying in bed.  HEENT: Pupils equal, round, reactive to light.   Respiratory: +Trach to mechanical vent. No respiratory distress, symmetric chest rise  Cardiovascular: Regular rate and rhythm    Gastrointestinal: +PEG, Abdomen soft, nondistended.  Neurological:  AAOX0-1. Opens eyes. Tracks vertically  Motor: RUE squeezes hand to command, LUE 0/5, RLE wiggles toes spontaneously, LLE withdraws  Sensation: unable to assess  Extremities: Warm, well perfused.    TUBES/LINES:  [] Vuong  [] Lumbar Drain  [] Wound Drains  [] Others  [x] trach  [x] peg     DIET:  [] NPO  [] Mechanical  [x] Tube feeds    LABS:                        10.7   8.81  )-----------( 213      ( 11 Mar 2025 05:30 )             32.7     03-11    137  |  100  |  59[H]  ----------------------------<  147[H]  4.3   |  21[L]  |  1.21    Ca    9.2      11 Mar 2025 05:30  Phos  4.9     03-11  Mg     2.3     03-11    TPro  7.2  /  Alb  3.3  /  TBili  <0.2  /  DBili  <0.1  /  AST  12  /  ALT  11  /  AlkPhos  197[H]  03-11      Urinalysis Basic - ( 11 Mar 2025 05:30 )    Color: x / Appearance: x / SG: x / pH: x  Gluc: 147 mg/dL / Ketone: x  / Bili: x / Urobili: x   Blood: x / Protein: x / Nitrite: x   Leuk Esterase: x / RBC: x / WBC x   Sq Epi: x / Non Sq Epi: x / Bacteria: x          CAPILLARY BLOOD GLUCOSE          Drug Levels: [] N/A    CSF Analysis: [] N/A      Allergies    Allergy Status Unknown    Intolerances      MEDICATIONS:  Antibiotics:    Neuro:  acetaminophen   Oral Liquid .. 650 milliGRAM(s) Oral every 6 hours PRN  gabapentin 300 milliGRAM(s) Oral every 12 hours  lacosamide 200 milliGRAM(s) Oral every 12 hours  levETIRAcetam 1500 milliGRAM(s) Oral every 12 hours  LORazepam   Injectable 2 milliGRAM(s) IV Push once PRN  phenytoin   Suspension 150 milliGRAM(s) Oral <User Schedule>  phenytoin   Suspension 200 milliGRAM(s) Oral <User Schedule>    Anticoagulation:  heparin   Injectable 5000 Unit(s) SubCutaneous every 8 hours    OTHER:  albuterol/ipratropium for Nebulization 3 milliLiter(s) Nebulizer every 4 hours  artificial tears (preservative free) Ophthalmic Solution 1 Drop(s) Both EYES every 4 hours  chlorhexidine 0.12% Liquid 15 milliLiter(s) Oral Mucosa every 12 hours  chlorhexidine 2% Cloths 1 Application(s) Topical <User Schedule>  doxazosin 4 milliGRAM(s) Oral at bedtime  erythromycin   Ointment 1 Application(s) Both EYES every 4 hours  fluticasone propionate 50 MICROgram(s)/spray Nasal Spray 1 Spray(s) Both Nostrils two times a day  influenza   Vaccine 0.5 milliLiter(s) IntraMuscular once  metoprolol tartrate 12.5 milliGRAM(s) Oral every 12 hours  pantoprazole   Suspension 40 milliGRAM(s) Oral daily  petrolatum Ophthalmic Ointment 1 Application(s) Both EYES every 4 hours  polyethylene glycol 3350 17 Gram(s) Oral daily  senna 2 Tablet(s) Oral at bedtime  sodium chloride 0.65% Nasal 1 Spray(s) Both Nostrils two times a day    IVF:    CULTURES:    RADIOLOGY & ADDITIONAL TESTS:      ASSESSMENT:  44M PMH ?stroke/MI present to [facility] after collapsing at work. Decorticate posturing, vomiting, intubated for airway protection. 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 Transferred to [facility] for further management. s/p trach 10/14. s/p peg 10/21. Re-upgrade to ICU 2/2 desaturation event and suctioning requirements 11/15. Re-upgrade to NSICU 12/15 2/2 desaturation and tachycardia.      AMS    Intubate    Handoff    MEWS Score    Acute myocardial infarction    CVA (cerebral vascular accident)    Intracerebral hemorrhage of brain stem    Brainstem stroke    Brain stem stroke syndrome    Brain stem hemorrhage    Brain stem stroke syndrome    Percutaneous tracheal puncture    Hemorrhagic stroke    Brainstem stroke    Pontine hemorrhage    Brainstem stroke    Encephalopathy acute    Functional quadriplegia    Advanced care planning/counseling discussion    Encounter for palliative care    Pontine hemorrhage    Neurogenic dysphagia    Chronic respiratory failure    Acute kidney injury superimposed on CKD    Acute urinary retention    Hypertensive emergency    Sepsis, unspecified organism    Sepsis    Gram-negative bacteremia    Dyspnea    Generalized pain    At risk for pain    Percutaneous tracheal puncture    Altered mental status examination    EGD, with PEG    AMS    Room Service Assist    EGD, with PEG    Functional quadriplegia    Neurogenic dysphagia    Chronic respiratory failure    Encounter for palliative care    Acute urinary retention    Hypertension    Seizures    [email or web address]        PLAN:    Neuro:  - neuro/vitals q4h  - pain control: tylenol prn  - seizure tx: keppra 1500mg BID, phenytoin 150/150/200, vimpat 200mg BID, gabapentin 300mg BID   - s/p vEEG (3/1- 3/6), s/p vEEG: +seizure on 2/17, 3/1  - CTH 9/30: enlarged pontine hemorrhage, CTH 10/3: stable, CTH 10/25: mostly resolved pontine hemorrhage, CTH 12/15: R mastoid air cell opacification; acute otitis media vs sterile effusion, CTH 12/18: stable. CTH 1/21 stable, CTH 1/27 stable, CTH 3/1: stable   - MRI brain 10/12: parenchymal hemorrhage, acute/subacute R cerebellar stroke      CV:  - -160  - HTN/tachycardia: Metoprolol 12.5 mg BID   - echo (9/30) EF 75%, repeat 12/16: 57%  - FL 3/3: 172    PULM:  - s/p trach exchange with pulm at bedside 2/14 to vent, AC/VC 40/400/14/6  - Secretions: duonebs/chest PT q4h  - CT chest 2/17: Near complete collapse b/l lower lobes. Patchy opacity within LLL/ALONDRA  - pulmonology consulted, recs appreciated    GI:  - TFs via PEG, [facility]  - bowel regimen, last BM 3/1    Renal:  - IVL; FW 200q6 for hydration  - Voiding, SC prn  - CKD: trend BUN/Cr  - renal US 10/1, 10/8, 1/15: Increased b/l renal echogenicity c/w medical renal disease  - urinary retention: doxazosin 4mg at bedtime     Endo:  - A1c 5.4    Heme:  - DVT ppx: SCDs, SQH 5000u q8h   - LE dopplers negative 12/16    ID:  - Afebrile, last pancx 2/17  - sputum 2/16 +Enterobacter cloacae: s/p Ertapenem (2/19-23) per ID recs, conolonized with stenotrophomonas  - empiric PNA: cefepime (12/22-12/30), s/p vanc (12/22-12/23), s/p zosyn (12/15-12/20), s/p vanc (12/15-12/16), s/p zosyn (1/12 -1/18), s/p DS bactrim TID, minocycline BID (2/18)  - Hx +klebsiella UTI & bacteremia, +stenotrophomopnas maltophilia PNA, +Enterobacter cloacae PNA     MISC:  - BL keratitis: BL erythromycin ointment, artificial tears, lacrilube q4, moisture chamber   - Penile wound: wound care rec barrier wipes     Dispo: SDU status, DNR, pending SNF    D/w Dr. D'Amico

## 2025-03-12 NOTE — PROVIDER CONTACT NOTE (OTHER) - REASON
Mental Status
Pt had a bloody BM
foul smelling urine
Hematology called to advised hg increased from 10.8 to 14.0
Pt HR 60-63 and cardiac Meds Ordered
Pt desatting 87%,  BPM
eyelid/lip twitching
tachycardic
Pt diastolic BP over parameter
Pt is diaphoretic and obtunded, only opens eyes to pain and repeated vigorous stimulation, withdraws extremities to pain
abrasion noted in the left eye and trach cuff inflated beyond parameters
pt right side facial twitching
Change in neuro exam
Pt retaining and straight cath performed x2 within the last 24hrs. Requesting Vuong placement.
Pt has diastolic BP above parameters
neurological assessment
Change in neuro exam
No urine output
Pt having possible seizure, desatting
Pt is obtunded and only moves his lower right extremity to pain, other extremities have no movement
trach exchange attempted but failed and pt blood pressure dropping post fentanyl 50 mcg
Change in neuro status

## 2025-03-12 NOTE — PROGRESS NOTE ADULT - TIME-BASED BILLING (NON-CRITICAL CARE)
Time-based billing (NON-critical care)

## 2025-03-13 ENCOUNTER — TRANSCRIPTION ENCOUNTER (OUTPATIENT)
Age: 45
End: 2025-03-13

## 2025-03-13 VITALS
HEART RATE: 70 BPM | OXYGEN SATURATION: 100 % | RESPIRATION RATE: 14 BRPM | DIASTOLIC BLOOD PRESSURE: 93 MMHG | SYSTOLIC BLOOD PRESSURE: 131 MMHG

## 2025-03-13 LAB
ANION GAP SERPL CALC-SCNC: 14 MMOL/L — SIGNIFICANT CHANGE UP (ref 5–17)
BUN SERPL-MCNC: 52 MG/DL — HIGH (ref 7–23)
CALCIUM SERPL-MCNC: 9.2 MG/DL — SIGNIFICANT CHANGE UP (ref 8.4–10.5)
CHLORIDE SERPL-SCNC: 100 MMOL/L — SIGNIFICANT CHANGE UP (ref 96–108)
CO2 SERPL-SCNC: 23 MMOL/L — SIGNIFICANT CHANGE UP (ref 22–31)
CREAT SERPL-MCNC: 1.16 MG/DL — SIGNIFICANT CHANGE UP (ref 0.5–1.3)
EGFR: 79 ML/MIN/1.73M2 — SIGNIFICANT CHANGE UP
EGFR: 79 ML/MIN/1.73M2 — SIGNIFICANT CHANGE UP
GLUCOSE SERPL-MCNC: 115 MG/DL — HIGH (ref 70–99)
HCT VFR BLD CALC: 31.1 % — LOW (ref 39–50)
HGB BLD-MCNC: 10.1 G/DL — LOW (ref 13–17)
MAGNESIUM SERPL-MCNC: 2.3 MG/DL — SIGNIFICANT CHANGE UP (ref 1.6–2.6)
MCHC RBC-ENTMCNC: 31.6 PG — SIGNIFICANT CHANGE UP (ref 27–34)
MCHC RBC-ENTMCNC: 32.5 G/DL — SIGNIFICANT CHANGE UP (ref 32–36)
MCV RBC AUTO: 97.2 FL — SIGNIFICANT CHANGE UP (ref 80–100)
NRBC BLD AUTO-RTO: 0 /100 WBCS — SIGNIFICANT CHANGE UP (ref 0–0)
PHOSPHATE SERPL-MCNC: 4.2 MG/DL — SIGNIFICANT CHANGE UP (ref 2.5–4.5)
PLATELET # BLD AUTO: 223 K/UL — SIGNIFICANT CHANGE UP (ref 150–400)
POTASSIUM SERPL-MCNC: 4.6 MMOL/L — SIGNIFICANT CHANGE UP (ref 3.5–5.3)
POTASSIUM SERPL-SCNC: 4.6 MMOL/L — SIGNIFICANT CHANGE UP (ref 3.5–5.3)
RBC # BLD: 3.2 M/UL — LOW (ref 4.2–5.8)
RBC # FLD: 12.6 % — SIGNIFICANT CHANGE UP (ref 10.3–14.5)
SODIUM SERPL-SCNC: 137 MMOL/L — SIGNIFICANT CHANGE UP (ref 135–145)
WBC # BLD: 6.37 K/UL — SIGNIFICANT CHANGE UP (ref 3.8–10.5)
WBC # FLD AUTO: 6.37 K/UL — SIGNIFICANT CHANGE UP (ref 3.8–10.5)

## 2025-03-13 PROCEDURE — 94002 VENT MGMT INPAT INIT DAY: CPT

## 2025-03-13 PROCEDURE — 76770 US EXAM ABDO BACK WALL COMP: CPT

## 2025-03-13 PROCEDURE — 84520 ASSAY OF UREA NITROGEN: CPT

## 2025-03-13 PROCEDURE — 70553 MRI BRAIN STEM W/O & W/DYE: CPT | Mod: MC

## 2025-03-13 PROCEDURE — 99231 SBSQ HOSP IP/OBS SF/LOW 25: CPT

## 2025-03-13 PROCEDURE — P9045: CPT

## 2025-03-13 PROCEDURE — 71250 CT THORAX DX C-: CPT | Mod: MC

## 2025-03-13 PROCEDURE — A9585: CPT

## 2025-03-13 PROCEDURE — 87077 CULTURE AEROBIC IDENTIFY: CPT

## 2025-03-13 PROCEDURE — 82550 ASSAY OF CK (CPK): CPT

## 2025-03-13 PROCEDURE — 84132 ASSAY OF SERUM POTASSIUM: CPT

## 2025-03-13 PROCEDURE — 97162 PT EVAL MOD COMPLEX 30 MIN: CPT

## 2025-03-13 PROCEDURE — 80048 BASIC METABOLIC PNL TOTAL CA: CPT

## 2025-03-13 PROCEDURE — 82610 CYSTATIN C: CPT

## 2025-03-13 PROCEDURE — 84300 ASSAY OF URINE SODIUM: CPT

## 2025-03-13 PROCEDURE — 85027 COMPLETE CBC AUTOMATED: CPT

## 2025-03-13 PROCEDURE — 74018 RADEX ABDOMEN 1 VIEW: CPT

## 2025-03-13 PROCEDURE — 87640 STAPH A DNA AMP PROBE: CPT

## 2025-03-13 PROCEDURE — 84484 ASSAY OF TROPONIN QUANT: CPT

## 2025-03-13 PROCEDURE — 87150 DNA/RNA AMPLIFIED PROBE: CPT

## 2025-03-13 PROCEDURE — 85379 FIBRIN DEGRADATION QUANT: CPT

## 2025-03-13 PROCEDURE — 97130 THER IVNTJ EA ADDL 15 MIN: CPT

## 2025-03-13 PROCEDURE — 97530 THERAPEUTIC ACTIVITIES: CPT

## 2025-03-13 PROCEDURE — 86901 BLOOD TYPING SEROLOGIC RH(D): CPT

## 2025-03-13 PROCEDURE — 84295 ASSAY OF SERUM SODIUM: CPT

## 2025-03-13 PROCEDURE — 80186 ASSAY OF PHENYTOIN FREE: CPT

## 2025-03-13 PROCEDURE — 82040 ASSAY OF SERUM ALBUMIN: CPT

## 2025-03-13 PROCEDURE — 95700 EEG CONT REC W/VID EEG TECH: CPT

## 2025-03-13 PROCEDURE — 83605 ASSAY OF LACTIC ACID: CPT

## 2025-03-13 PROCEDURE — 82272 OCCULT BLD FECES 1-3 TESTS: CPT

## 2025-03-13 PROCEDURE — 87641 MR-STAPH DNA AMP PROBE: CPT

## 2025-03-13 PROCEDURE — 87184 SC STD DISK METHOD PER PLATE: CPT

## 2025-03-13 PROCEDURE — 97165 OT EVAL LOW COMPLEX 30 MIN: CPT

## 2025-03-13 PROCEDURE — 84100 ASSAY OF PHOSPHORUS: CPT

## 2025-03-13 PROCEDURE — 31500 INSERT EMERGENCY AIRWAY: CPT

## 2025-03-13 PROCEDURE — 89051 BODY FLUID CELL COUNT: CPT

## 2025-03-13 PROCEDURE — 80171 DRUG SCREEN QUANT GABAPENTIN: CPT

## 2025-03-13 PROCEDURE — 86900 BLOOD TYPING SEROLOGIC ABO: CPT

## 2025-03-13 PROCEDURE — 80185 ASSAY OF PHENYTOIN TOTAL: CPT

## 2025-03-13 PROCEDURE — 82570 ASSAY OF URINE CREATININE: CPT

## 2025-03-13 PROCEDURE — 81001 URINALYSIS AUTO W/SCOPE: CPT

## 2025-03-13 PROCEDURE — 71275 CT ANGIOGRAPHY CHEST: CPT | Mod: MC

## 2025-03-13 PROCEDURE — 94799 UNLISTED PULMONARY SVC/PX: CPT

## 2025-03-13 PROCEDURE — 76775 US EXAM ABDO BACK WALL LIM: CPT

## 2025-03-13 PROCEDURE — 87186 SC STD MICRODIL/AGAR DIL: CPT

## 2025-03-13 PROCEDURE — 80202 ASSAY OF VANCOMYCIN: CPT

## 2025-03-13 PROCEDURE — 82330 ASSAY OF CALCIUM: CPT

## 2025-03-13 PROCEDURE — 87040 BLOOD CULTURE FOR BACTERIA: CPT

## 2025-03-13 PROCEDURE — 97535 SELF CARE MNGMENT TRAINING: CPT

## 2025-03-13 PROCEDURE — 83735 ASSAY OF MAGNESIUM: CPT

## 2025-03-13 PROCEDURE — 99291 CRITICAL CARE FIRST HOUR: CPT

## 2025-03-13 PROCEDURE — 83880 ASSAY OF NATRIURETIC PEPTIDE: CPT

## 2025-03-13 PROCEDURE — 82565 ASSAY OF CREATININE: CPT

## 2025-03-13 PROCEDURE — C8929: CPT

## 2025-03-13 PROCEDURE — 94003 VENT MGMT INPAT SUBQ DAY: CPT

## 2025-03-13 PROCEDURE — 84540 ASSAY OF URINE/UREA-N: CPT

## 2025-03-13 PROCEDURE — 36415 COLL VENOUS BLD VENIPUNCTURE: CPT

## 2025-03-13 PROCEDURE — 95708 EEG WO VID EA 12-26HR UNMNTR: CPT

## 2025-03-13 PROCEDURE — 97129 THER IVNTJ 1ST 15 MIN: CPT

## 2025-03-13 PROCEDURE — 87070 CULTURE OTHR SPECIMN AEROBIC: CPT

## 2025-03-13 PROCEDURE — 80053 COMPREHEN METABOLIC PANEL: CPT

## 2025-03-13 PROCEDURE — 71045 X-RAY EXAM CHEST 1 VIEW: CPT

## 2025-03-13 PROCEDURE — 70450 CT HEAD/BRAIN W/O DYE: CPT | Mod: MC

## 2025-03-13 PROCEDURE — 83935 ASSAY OF URINE OSMOLALITY: CPT

## 2025-03-13 PROCEDURE — 94640 AIRWAY INHALATION TREATMENT: CPT

## 2025-03-13 PROCEDURE — 85730 THROMBOPLASTIN TIME PARTIAL: CPT

## 2025-03-13 PROCEDURE — 96374 THER/PROPH/DIAG INJ IV PUSH: CPT

## 2025-03-13 PROCEDURE — 84145 PROCALCITONIN (PCT): CPT

## 2025-03-13 PROCEDURE — 74177 CT ABD & PELVIS W/CONTRAST: CPT | Mod: MC

## 2025-03-13 PROCEDURE — 83930 ASSAY OF BLOOD OSMOLALITY: CPT

## 2025-03-13 PROCEDURE — 87086 URINE CULTURE/COLONY COUNT: CPT

## 2025-03-13 PROCEDURE — 97112 NEUROMUSCULAR REEDUCATION: CPT

## 2025-03-13 PROCEDURE — 0225U NFCT DS DNA&RNA 21 SARSCOV2: CPT

## 2025-03-13 PROCEDURE — 99233 SBSQ HOSP IP/OBS HIGH 50: CPT

## 2025-03-13 PROCEDURE — 99232 SBSQ HOSP IP/OBS MODERATE 35: CPT

## 2025-03-13 PROCEDURE — 92507 TX SP LANG VOICE COMM INDIV: CPT

## 2025-03-13 PROCEDURE — 85025 COMPLETE CBC W/AUTO DIFF WBC: CPT

## 2025-03-13 PROCEDURE — 80076 HEPATIC FUNCTION PANEL: CPT

## 2025-03-13 PROCEDURE — C9254: CPT

## 2025-03-13 PROCEDURE — 82803 BLOOD GASES ANY COMBINATION: CPT

## 2025-03-13 PROCEDURE — 86850 RBC ANTIBODY SCREEN: CPT

## 2025-03-13 PROCEDURE — 96375 TX/PRO/DX INJ NEW DRUG ADDON: CPT

## 2025-03-13 PROCEDURE — 85610 PROTHROMBIN TIME: CPT

## 2025-03-13 PROCEDURE — 93970 EXTREMITY STUDY: CPT

## 2025-03-13 PROCEDURE — 87205 SMEAR GRAM STAIN: CPT

## 2025-03-13 PROCEDURE — 82962 GLUCOSE BLOOD TEST: CPT

## 2025-03-13 PROCEDURE — 97110 THERAPEUTIC EXERCISES: CPT

## 2025-03-13 PROCEDURE — 93005 ELECTROCARDIOGRAM TRACING: CPT

## 2025-03-13 PROCEDURE — 80177 DRUG SCRN QUAN LEVETIRACETAM: CPT

## 2025-03-13 PROCEDURE — T1013: CPT

## 2025-03-13 RX ORDER — LACOSAMIDE 150 MG/1
1 TABLET, FILM COATED ORAL
Qty: 0 | Refills: 0 | DISCHARGE
Start: 2025-03-13

## 2025-03-13 RX ORDER — SENNA 187 MG
2 TABLET ORAL
Qty: 0 | Refills: 0 | DISCHARGE
Start: 2025-03-13

## 2025-03-13 RX ORDER — METOPROLOL SUCCINATE 50 MG/1
12.5 TABLET, EXTENDED RELEASE ORAL
Qty: 0 | Refills: 0 | DISCHARGE
Start: 2025-03-13

## 2025-03-13 RX ORDER — DOXAZOSIN MESYLATE 8 MG/1
1 TABLET ORAL
Qty: 0 | Refills: 0 | DISCHARGE
Start: 2025-03-13

## 2025-03-13 RX ORDER — POLYETHYLENE GLYCOL 3350 17 G/17G
17 POWDER, FOR SOLUTION ORAL
Qty: 0 | Refills: 0 | DISCHARGE
Start: 2025-03-13

## 2025-03-13 RX ORDER — HYPROMELLOSE 0.4 %
1 DROPS OPHTHALMIC (EYE)
Qty: 0 | Refills: 0 | DISCHARGE
Start: 2025-03-13

## 2025-03-13 RX ORDER — HEPARIN SODIUM 1000 [USP'U]/ML
5000 INJECTION INTRAVENOUS; SUBCUTANEOUS
Qty: 0 | Refills: 0 | DISCHARGE
Start: 2025-03-13

## 2025-03-13 RX ORDER — LEVETIRACETAM 10 MG/ML
2 INJECTION, SOLUTION INTRAVENOUS
Qty: 0 | Refills: 0 | DISCHARGE
Start: 2025-03-13

## 2025-03-13 RX ORDER — ERYTHROMYCIN 5 MG/G
1 OINTMENT OPHTHALMIC
Qty: 0 | Refills: 0 | DISCHARGE
Start: 2025-03-13

## 2025-03-13 RX ORDER — GABAPENTIN 400 MG/1
1 CAPSULE ORAL
Qty: 0 | Refills: 0 | DISCHARGE
Start: 2025-03-13

## 2025-03-13 RX ORDER — ACETAMINOPHEN 500 MG/5ML
20.31 LIQUID (ML) ORAL
Qty: 0 | Refills: 0 | DISCHARGE
Start: 2025-03-13

## 2025-03-13 RX ORDER — PHENYTOIN 100 MG/4ML
6 SUSPENSION, ORAL (FINAL DOSE FORM) ORAL
Qty: 0 | Refills: 0 | DISCHARGE
Start: 2025-03-13

## 2025-03-13 RX ORDER — IPRATROPIUM BROMIDE AND ALBUTEROL SULFATE .5; 2.5 MG/3ML; MG/3ML
3 SOLUTION RESPIRATORY (INHALATION)
Qty: 0 | Refills: 0 | DISCHARGE
Start: 2025-03-13

## 2025-03-13 RX ORDER — BISACODYL 5 MG
1 TABLET, DELAYED RELEASE (ENTERIC COATED) ORAL
Qty: 0 | Refills: 0 | DISCHARGE
Start: 2025-03-13

## 2025-03-13 RX ORDER — PHENYTOIN 100 MG/4ML
8 SUSPENSION, ORAL (FINAL DOSE FORM) ORAL
Qty: 0 | Refills: 0 | DISCHARGE
Start: 2025-03-13

## 2025-03-13 RX ADMIN — Medication 1 APPLICATION(S): at 10:42

## 2025-03-13 RX ADMIN — Medication 1 DROP(S): at 01:10

## 2025-03-13 RX ADMIN — ERYTHROMYCIN 1 APPLICATION(S): 5 OINTMENT OPHTHALMIC at 05:22

## 2025-03-13 RX ADMIN — Medication 1 APPLICATION(S): at 05:24

## 2025-03-13 RX ADMIN — Medication 150 MILLIGRAM(S): at 12:11

## 2025-03-13 RX ADMIN — IPRATROPIUM BROMIDE AND ALBUTEROL SULFATE 3 MILLILITER(S): .5; 2.5 SOLUTION RESPIRATORY (INHALATION) at 05:21

## 2025-03-13 RX ADMIN — Medication 15 MILLILITER(S): at 05:21

## 2025-03-13 RX ADMIN — Medication 1 DROP(S): at 05:22

## 2025-03-13 RX ADMIN — ERYTHROMYCIN 1 APPLICATION(S): 5 OINTMENT OPHTHALMIC at 01:10

## 2025-03-13 RX ADMIN — Medication 1 DROP(S): at 10:41

## 2025-03-13 RX ADMIN — GABAPENTIN 300 MILLIGRAM(S): 400 CAPSULE ORAL at 01:09

## 2025-03-13 RX ADMIN — Medication 10 MILLIGRAM(S): at 12:10

## 2025-03-13 RX ADMIN — Medication 1 APPLICATION(S): at 01:10

## 2025-03-13 RX ADMIN — POLYETHYLENE GLYCOL 3350 17 GRAM(S): 17 POWDER, FOR SOLUTION ORAL at 12:10

## 2025-03-13 RX ADMIN — ERYTHROMYCIN 1 APPLICATION(S): 5 OINTMENT OPHTHALMIC at 10:41

## 2025-03-13 RX ADMIN — LEVETIRACETAM 1500 MILLIGRAM(S): 10 INJECTION, SOLUTION INTRAVENOUS at 05:23

## 2025-03-13 RX ADMIN — IPRATROPIUM BROMIDE AND ALBUTEROL SULFATE 3 MILLILITER(S): .5; 2.5 SOLUTION RESPIRATORY (INHALATION) at 10:40

## 2025-03-13 RX ADMIN — HEPARIN SODIUM 5000 UNIT(S): 1000 INJECTION INTRAVENOUS; SUBCUTANEOUS at 05:23

## 2025-03-13 RX ADMIN — LACOSAMIDE 200 MILLIGRAM(S): 150 TABLET, FILM COATED ORAL at 05:24

## 2025-03-13 RX ADMIN — FLUTICASONE PROPIONATE 1 SPRAY(S): 50 SPRAY, METERED NASAL at 05:23

## 2025-03-13 RX ADMIN — Medication 40 MILLIGRAM(S): at 12:10

## 2025-03-13 RX ADMIN — Medication 1 APPLICATION(S): at 05:22

## 2025-03-13 RX ADMIN — METOPROLOL SUCCINATE 12.5 MILLIGRAM(S): 50 TABLET, EXTENDED RELEASE ORAL at 05:24

## 2025-03-13 RX ADMIN — Medication 150 MILLIGRAM(S): at 04:04

## 2025-03-13 RX ADMIN — Medication 1 SPRAY(S): at 05:23

## 2025-03-13 RX ADMIN — IPRATROPIUM BROMIDE AND ALBUTEROL SULFATE 3 MILLILITER(S): .5; 2.5 SOLUTION RESPIRATORY (INHALATION) at 02:00

## 2025-03-13 NOTE — DISCHARGE NOTE NURSING/CASE MANAGEMENT/SOCIAL WORK - NSDCPEFALRISK_GEN_ALL_CORE
For information on Fall & Injury Prevention, visit: [email or web address] OR  [email or web address] OR  https://www.cdc.gov/steadi/patient.html

## 2025-03-13 NOTE — DISCHARGE NOTE NURSING/CASE MANAGEMENT/SOCIAL WORK - FINANCIAL ASSISTANCE
[facility] provides services at a reduced cost to those who are determined to be eligible through [facility]’s financial assistance program. Information regarding [facility]’s financial assistance program can be found by going to [email or web address] or by calling [phone].

## 2025-03-13 NOTE — PROGRESS NOTE ADULT - ASSESSMENT
44M with unclear PMH (?stroke, MI) who was found down at work, intubated for airway protection and found to have acute parenchymal hemorrhage within nabil with mass effect (+ acute/subacute right cerebellar infarct) in setting of hypertensive emergency, transferred to [facility] for further neurosurgical care. Hospital course c/b poor neurologic recovery s/p trach-PEG, AUR s/p gabriel, ANIKA on CKD c/b hyperkalemia, HAP s/p amp-sulbactam (EOT 10/23), K aerogenes bacteremia  treated with 2 weeks of Cefepime per ID , On 11/15 he became septic and was transferred to NSICU due to increased o2 requirements and needed Vent support , Trach Cultures + for Stenotrophomonas which was treated with 7 days of Bactrim per ID , has been weaned of Vent since 11/23 and is now on 10lts at 40% o2 through Trach Collar. Stepped up to the NSICU on 12/15 for hypoxia and tachycardia. Pt s/p  abx for 5 days. Pt now stepped down to 8Lach.    # Pontine hemorrhage  # Encephalopathy due to Intracranial Bleed   #Trach, Vent and PEG Dependent  - Acute parenchymal hemorrhage within nabil with mass effect (+ acute/subacute right cerebellar infarct) in setting of hypertensive emergency.   - MRI brain also demonstrated acute/subacute R cerebellar stroke.   - No Neurosurgical Interventions were performed   - Patient evaluated by Ethics and Palliative, Palliative contacted for follow-up in view of mental status this morning/     # ANIKA on CKD stage IV  - Creatinine plateaued, monitor UOP, Gabriel placed  - Continue Free water via PEG @ 200q6h  - Continue to monitor creatinine, avoid nephrotoxics   - Will need to adjust medications to Gfr and Epilepsy recs     # Seizures  - Continue Levetiracetam 1500 mg q12h and Vimpat 200 mg q12h.  - Continue Phenytoin 150mg qAM, qNoon, and 200 mg qHS   - Gabapentin decreased, follow-up level and adjust further   -Follow-up Gabapentin level   - Epilepsy team following  - Seizure precautions     # Hypertension  -Continue Metoprolol 12.5mg q12     # Acute on Chronic respiratory failure   - Continue vent support and chest PT    # Neurogenic dysphagia.   - s/p PEG placement by surgery 10/21/24  - Continue TF per nutrition  - Continue aspiration precautions and elevation of HOB.    # Urinary retention  # BPH   - doxazosin 4mg qHS   - Gabriel placed, TOV as able    # Functional quadriplegia in setting of brainstem hemorrhage  - decub precautions  - care per nursing protocol     # Anemia of Chronic Disease   -  monitor H/H stable ~10-11    #DVT ppx  -Heparin SQ TID     #DISPO  -Palliative and Ethics following

## 2025-03-13 NOTE — PROGRESS NOTE ADULT - PROBLEM SELECTOR PLAN 6
Vuong placed 10/24 for urinary retention, failed TOV 10/27.   - doxazosin 8mg
S/p PEG with surgery 10/21/24  - TF per nutrition  - aspiration precautions and elevation of HOB
Vuong placed 10/24 for urinary retention, failed TOV 10/27.   - doxazosin 8mg
S/p PEG with surgery 10/21/24  - TF per nutrition  - aspiration precautions and elevation of HOB
.  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    Will continue to follow for ongoing GOC discussion / titration of palliative regimen. Emotional support provided, questions answered.  Active Psychosocial Referrals:  [x]Social Work/Case management []PT/OT []Chaplaincy []Hospice  []Patient/Family Support []Holistic RN []Massage Therapy []Music Therapy []Ethics  Coping: [] well [] with difficulty [] poor coping [] unable to assess  Support system: [] strong [] adequate [] inadequate    For new or uncontrolled symptoms, please call Palliative Care at [phone]). The service is available 24/7 (including nights & weekends) to provide symptom management recommendations over the phone as appropriate
.  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    Will continue to follow for ongoing GOC discussion / titration of palliative regimen. Emotional support provided, questions answered.  Active Psychosocial Referrals:  [x]Social Work/Case management []PT/OT []Chaplaincy []Hospice  []Patient/Family Support []Holistic RN []Massage Therapy []Music Therapy []Ethics  Coping: [] well [] with difficulty [] poor coping [] unable to assess  Support system: [] strong [] adequate [] inadequate    For new or uncontrolled symptoms, please call Palliative Care at [phone]). The service is available 24/7 (including nights & weekends) to provide symptom management recommendations over the phone as appropriate
S/p PEG with surgery 10/21/24  - TF per nutrition  - aspiration precautions and elevation of HOB
.  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    Will continue to follow for ongoing GOC discussion / titration of palliative regimen. Emotional support provided, questions answered.  Active Psychosocial Referrals:  [x]Social Work/Case management []PT/OT []Chaplaincy []Hospice  []Patient/Family Support []Holistic RN []Massage Therapy []Music Therapy []Ethics  Coping: [] well [] with difficulty [] poor coping [] unable to assess  Support system: [] strong [] adequate [] inadequate    For new or uncontrolled symptoms, please call Palliative Care at [phone]). The service is available 24/7 (including nights & weekends) to provide symptom management recommendations over the phone as appropriate
S/p PEG with surgery 10/21/24  - TF per nutrition  - aspiration precautions and elevation of HOB
Vuong placed 10/24 for urinary retention, failed TOV 10/27.   - doxazosin 8mg
Vuong placed 10/24 for urinary retention, failed TOV 10/27.   - doxazosin 8mg
S/p PEG with surgery 10/21/24  - TF per nutrition  - aspiration precautions and elevation of HOB

## 2025-03-13 NOTE — PROGRESS NOTE ADULT - NS ATTEST RISK PROBLEM GEN_ALL_CORE FT
acute illness   chronic illnesses with progression  medication management  evaluation for hospice care  complex decision making  need for independent historian

## 2025-03-13 NOTE — PROGRESS NOTE ADULT - PROBLEM SELECTOR PLAN 2
Hypertensive emergency req nicardipine gtt in NSICU. Unclear PMH.   - cont amlodipine 10mg
.  -the patient is at risk for pain due to prolonged immobilization, trach to vent, PEG tube, and acute illness.  -per report, nursing staff is concerned patient is showing signs of pain during care and movement of his LE. On exam today, the patient was not awake or responsive to answer yes/no questions. He has flaccid paralysis. I noted some minor jerking like movement/spasms of the UE with passive flexion of R hip once; no jerking movements with repetition of passive leg motion. No nonverbal signs of discomfort noted w/ exam, though it is likely the patient is not able to demonstrate any pain due to functional quadriplegia.     -recommendations:  --continue w/ tylenol 650mg q6h PRN; can try and give one dose daily before care  --continue w/ gabapentin 300mg TID  --no indication for baclofen at the moment; no signs of spastic paralysis  --will continue to evaluate patient frequently with the hopes of asking him if he is in pain.
.  -the patient is at risk for pain due to prolonged immobilization, trach to vent, PEG tube, and acute illness.  -per report, nursing staff is concerned patient is showing signs of pain during care and movement of his LE. Today, no report of pain episodes as per floor RN or the patient's son.    -recommendations:  --continue w/ tylenol 650mg q6h PRN; can try and give one dose daily before care  --continue w/ gabapentin 300mg TID  --no indication for baclofen at the moment; no signs of spastic paralysis  --will continue to evaluate patient frequently with the hopes of asking him if he is in pain.
Found to have acute parenchymal hemorrhage within nabil with mass effect (+ acute/subacute right cerebellar infarct) in setting of hypertensive emergency. MRI brain also demonstrated acute/subacute R cerebellar stroke.   - management per NSG, neuro checks  - pain control with bowel regimen (PRN)  - chemical DVT ppx with HSQ
Collapsed and found unresponsive at work. Now off sedation and still unresponsive.  - Supportive care.
Found to have acute parenchymal hemorrhage within nabil with mass effect (+ acute/subacute right cerebellar infarct) in setting of hypertensive emergency. MRI brain also demonstrated acute/subacute R cerebellar stroke.   - management per NSG, neuro checks  - pain control with bowel regimen (PRN)  - chemical DVT ppx with HSQ
-ventilator dependent   -failed last CPAP trial on 12/17  -vent care as per primary team  -no evidence of discomfort on vent at the moment  -recommend SBT.
-ventilator dependent   -vent care as per primary team  -no evidence of discomfort on vent  -recommend SBT.
Hypertensive emergency req nicardipine gtt in NSICU.   - cont amlodipine 10mg
Collapsed and found unresponsive at work. Now off sedation and still unresponsive.  - Supportive care.
.  -patient dependent on ventilator  -failed last CPAP trial on 12/17  -vent care as per primary team  -no evidence of discomfort on vent at the moment
.  -patient dependent on ventilator  -vent care as per primary team  -no evidence of discomfort on vent at the moment
Found to have acute parenchymal hemorrhage within nabil with mass effect (+ acute/subacute right cerebellar infarct) in setting of hypertensive emergency. MRI brain also demonstrated acute/subacute R cerebellar stroke.   - management per NSG, neuro checks  - pain control with bowel regimen (PRN)  - chemical DVT ppx with HSQ
Hypertensive emergency req nicardipine gtt in NSICU. Unclear PMH.   - cont amlodipine 10mg
.  -the patient is at risk for pain due to prolonged immobilization, trach to vent, PEG tube, and acute illness.  -per report, nursing staff is concerned patient is showing signs of pain during care and movement of his LE. Today, no report of pain episodes as per floor RN or the patient's son.    -recommendations:  --continue w/ tylenol 650mg q6h PRN; can try and give one dose daily before care  --continue w/ gabapentin 300mg TID  --no indication for baclofen at the moment; no signs of spastic paralysis  --will continue to evaluate patient frequently with the hopes of asking him if he is in pain.
Collapsed and found unresponsive at work. Found to have a large brainstem bleed. Now off sedation and still unresponsive.  - Supportive care.
Hypertensive emergency req nicardipine gtt in NSICU.   - cont amlodipine 10mg
Found to have acute parenchymal hemorrhage within nabil with mass effect (+ acute/subacute right cerebellar infarct) in setting of hypertensive emergency. MRI brain also demonstrated acute/subacute R cerebellar stroke.   - management per NSG, neuro checks  - pain control with bowel regimen (PRN)  - chemical DVT ppx with HSQ
Found to have acute parenchymal hemorrhage within nabil with mass effect (+ acute/subacute right cerebellar infarct) in setting of hypertensive emergency. MRI brain also demonstrated acute/subacute R cerebellar stroke.   - management per NSG, neuro checks  - pain control with bowel regimen (PRN)  - chemical DVT ppx with HSQ
-ventilator dependent   -failed last CPAP trial on 12/17  -vent care as per primary team  -no evidence of discomfort on vent at the moment

## 2025-03-13 NOTE — PROGRESS NOTE ADULT - PROBLEM SELECTOR PROBLEM 1
Gram-negative bacteremia
Sepsis
Brainstem stroke
Functional quadriplegia
Pontine hemorrhage
Pontine hemorrhage
Functional quadriplegia
Functional quadriplegia
Gram-negative bacteremia
Functional quadriplegia
Brainstem stroke
Functional quadriplegia
Functional quadriplegia
Pontine hemorrhage
Brainstem stroke
Pontine hemorrhage
Functional quadriplegia
Functional quadriplegia
Sepsis
Sepsis

## 2025-03-13 NOTE — PROGRESS NOTE ADULT - PROBLEM SELECTOR PROBLEM 3
Pontine hemorrhage
Pontine hemorrhage
Hypertensive emergency
Pontine hemorrhage
Acute kidney injury superimposed on CKD
Functional quadriplegia
Pontine hemorrhage
Acute kidney injury superimposed on CKD
Dyspnea
Functional quadriplegia
Pontine hemorrhage
Dyspnea
Acute kidney injury superimposed on CKD
Hypertensive emergency
Dyspnea
Acute kidney injury superimposed on CKD
Functional quadriplegia
Hypertensive emergency

## 2025-03-13 NOTE — PROGRESS NOTE ADULT - PROBLEM SELECTOR PLAN 5
S/p PEG with surgery 10/21/24  - TF per nutrition  - aspiration precautions and elevation of HOB
Following for complex medical decision making and support. Will continue to follow.
.  -Son Olivier, is the NOK for medical decisions; patient is able to reply yes/no questions. Multiple conversations held with patient and his son. They are aware they can opt to discontinue ventilator if offered treatments are no longer in line with their GOC.  -DNR/intubate -- please see documented GOC from 12/30/2024  -GOC are to prolong life, continue with current medical support, and allow for a natural death in case of a cardiac arrest.  -Dispo: NH w/ vent    -patient and family aware of poor prognosis. son is hopeful to prolong the patient's life as much as possible.  -empathetic listening and emotional support provided. Son is struggling to maintain himself alone in [facility]. Floor SW attempting to help/assist.    -appreciate Ethics consult. While only patient/or surrogate decision makers can decide to go a ventilator liberation and/or hospice enrollement, the primary team providers can decide to not start medical interventions that are deemed medical futile, including antibiotics, or further escalation of care.
S/p percutaneous trach by pulm on 10/14/24  - currently on trach collar   - cont routine trach care, frequent suctioning  - duo-neb with mucomyst q6hr PRN  - strong pulm hygiene with chest PT BID
.  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    Will continue to follow for ongoing GOC discussion / titration of palliative regimen. Emotional support provided, questions answered.  Active Psychosocial Referrals:  [x]Social Work/Case management []PT/OT []Chaplaincy []Hospice  []Patient/Family Support []Holistic RN []Massage Therapy []Music Therapy []Ethics  Coping: [] well [] with difficulty [] poor coping [] unable to assess  Support system: [] strong [] adequate [] inadequate    For new or uncontrolled symptoms, please call Palliative Care at [phone]). The service is available 24/7 (including nights & weekends) to provide symptom management recommendations over the phone as appropriate
Following for complex medical decision making and support. Will continue to follow.
.  -Son Olivier, is the NOK for medical decisions; patient is able to reply yes/no questions. informed about vent dependence and plan for NH placement.   -FULL CODE  -GOC are to prolong life  -Dispo: NH w/ vent    -patient and family aware of poor prognosis. son is hopeful to prolong the patient's life as much as possible.  -empathetic listening and emotional support provided.
S/p percutaneous trach by pulm on 10/14/24  - currently on trach collar   - cont routine trach care, frequent suctioning  - duo-neb with mucomyst q6hr PRN  - strong pulm hygiene with chest PT BID (please ensure receives)
.  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    Will continue to follow for ongoing GOC discussion / titration of palliative regimen. Emotional support provided, questions answered.  Active Psychosocial Referrals:  [x]Social Work/Case management []PT/OT []Chaplaincy []Hospice  []Patient/Family Support []Holistic RN []Massage Therapy []Music Therapy []Ethics  Coping: [] well [] with difficulty [] poor coping [] unable to assess  Support system: [] strong [] adequate [] inadequate    For new or uncontrolled symptoms, please call Palliative Care at [phone]). The service is available 24/7 (including nights & weekends) to provide symptom management recommendations over the phone as appropriate
/p PEG with surgery 10/21/24  - TF per nutrition  - aspiration precautions and elevation of HOB
.  -Son Olivier, is the NOK for medical decisions; patient is able to reply yes/no questions. informed about vent dependence and plan for NH placement.   -FULL CODE  -GOC are to prolong life  -Dispo: NH w/ vent    -patient and family aware of poor prognosis. son is hopeful to prolong the patient's life as much as possible.  -empathetic listening and emotional support provided. Son is struggling to maintain himself alone in [place].
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  - Emotional support provided, questions answered. Palliative medicine will sign off.  Please contact Palliative Medicine 24/7 at [phone] if the patient's symptoms and/or goals of care change, need to be readdressed, or if patient is readmitted in the future.
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  - Emotional support provided, questions answered. Palliative medicine will sign off.  Please contact Palliative Medicine 24/7 at [phone] if the patient's symptoms and/or goals of care change, need to be readdressed, or if patient is readmitted in the future.
S/p PEG with surgery 10/21/24  - TF per nutrition  - aspiration precautions and elevation of HOB
S/p percutaneous trach by pulm on 10/14/24  - currently on trach collar   - cont routine trach care, frequent suctioning  - duo-neb with mucomyst q6hr PRN  - strong pulm hygiene with chest PT BID (please ensure receives)
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
S/p percutaneous trach by pulm on 10/14/24  - currently on trach collar   - cont routine trach care, frequent suctioning  - duo-neb with mucomyst q6hr PRN  - strong pulm hygiene with chest PT BID (please ensure receives)
S/p PEG with surgery 10/21/24  - TF per nutrition  - aspiration precautions and elevation of HOB
Following for GOC. Goals established. Will sign off. Please reconsult for any clinical changes warranting further goals of care.
S/p percutaneous trach by pulm on 10/14/24  - currently on trach collar 10L  - cont routine trach care, suctioning PRN  - duo-neb with mucomyst q6hr standing   - strong pulm hygiene with chest PT BID (please ensure receives)

## 2025-03-13 NOTE — PROGRESS NOTE ADULT - NS ATTEST RISKLEV GEN_ALL_CORE
Moderate
High
Moderate
Moderate
High
Moderate
High
Moderate

## 2025-03-13 NOTE — PROGRESS NOTE ADULT - ASSESSMENT
43yo M w/ reported PMHx of MI and previous strokes, who was admitted to [facility] on 09/30/2024 for AMS, found to have acute large parenchymal hemorrhage within nabil with mass effect (+ acute/subacute right cerebellar infarct) in setting of hypertensive emergency, and R cerebellar stroke , c/b poor neurologic recovery, now trach/vent dependent/PEG, with recurrent fever/infections and ICU readmissions a/w focal status epilepticus. Symptoms are controlled at the moment. 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 The patient is able to communicate with flexing his R fist. Palliative medicine reconsulted for GOC and symptom management.    PPS 10%. Prognosis is poor.  Patient is at high risk of death and complications.  Clinically stable; seizure episodes being controlled, as per epilepsy team.  GOC are to prolong life.  DNR/intubate

## 2025-03-13 NOTE — PROGRESS NOTE ADULT - PROBLEM SELECTOR PROBLEM 2
Dyspnea
Pontine hemorrhage
Dyspnea
Pontine hemorrhage
At risk for pain
Hypertensive emergency
Dyspnea
Encephalopathy acute
Encephalopathy acute
Hypertensive emergency
Hypertensive emergency
Encephalopathy acute
Dyspnea
At risk for pain
Hypertensive emergency
Pontine hemorrhage
At risk for pain
Dyspnea
Pontine hemorrhage
Pontine hemorrhage

## 2025-03-13 NOTE — DISCHARGE NOTE NURSING/CASE MANAGEMENT/SOCIAL WORK - PATIENT PORTAL LINK FT
You can access the FollowMyHealth Patient Portal offered by [facility] by registering at the following website: [email or web address]. By joining [employer]’s FollowMyHealth portal, you will also be able to view your health information using other applications (apps) compatible with our system.

## 2025-03-13 NOTE — PROGRESS NOTE ADULT - NS ATTEST RISK GEN_ALL_CORE
Risk Statement (NON-critical care)

## 2025-03-13 NOTE — PROGRESS NOTE ADULT - THIS PATIENT HAS THE FOLLOWING CONDITION(S)/DIAGNOSES ON THIS ADMISSION:
None
Acute Respiratory Failure
Cerebral Edema/Brain Compression / Herniation
None
Cerebral Edema/Brain Compression / Herniation
None
Acute Respiratory Failure
Acute Respiratory Failure
Cerebral Edema/Brain Compression / Herniation
Cerebral Edema/Brain Compression / Herniation
None
Acute Respiratory Failure
Cerebral Edema/Brain Compression / Herniation
None
Acute Respiratory Failure
Cerebral Edema/Brain Compression / Herniation
Cerebral Edema/Brain Compression / Herniation
None
Acute Respiratory Failure
Acute Respiratory Failure
None

## 2025-03-13 NOTE — PROGRESS NOTE ADULT - SUBJECTIVE AND OBJECTIVE BOX
[facility] and Palliative [facility]  Roger Fernandez, Geriatrics & Palliative Medicine attending  Contact Info: Call [phone] (HEAL Line) or message on [employer] Teams    SUBJECTIVE AND OBJECTIVE:  The patient was seen at bedside. His son was at bedside.  The patient was awake and able to communicate via flexing his R fist. No new complaints were reported.   He did not reply "yes" when asked if he was having pain or discomfort. I tried 3 times.    I told the patient about the plans to have him discharged to rehab. He expressed agreement.  I told the patient that while we were hopeful for any recovery, chances are low, and the treatment path would be long and uncertain.   Shared with him our goal is to make sure he feels as good as he can, and not cause more suffering.  Told the patient he could choose not to continue with treatment and/or have the ventilation removed at any moment, if he felt he could not take it anymore.  His son, also told him the same, that if he felt this was too much, he could ask to stop treatments.  The patient did not show any signs asking to have treatments withdrawn.   I asked again if he understood what was being told to him, he flexed his fist, communicating understanding.  I asked if was in agreement with plan of being moved to rehab, witch he flexed his fist in agreement.    INTERVAL HPI/OVERNIGHT EVENTS: Interval events noted. See patient's PRN use for the past 24hrs noted below.     ALLERGIES:  Allergy Status Unknown    MEDICATIONS  (STANDING):  albuterol/ipratropium for Nebulization 3 milliLiter(s) Nebulizer every 4 hours  artificial tears (preservative free) Ophthalmic Solution 1 Drop(s) Both EYES every 4 hours  bisacodyl Suppository 10 milliGRAM(s) Rectal daily  chlorhexidine 0.12% Liquid 15 milliLiter(s) Oral Mucosa every 12 hours  chlorhexidine 2% Cloths 1 Application(s) Topical <User Schedule>  doxazosin 4 milliGRAM(s) Oral at bedtime  erythromycin   Ointment 1 Application(s) Both EYES every 4 hours  fluticasone propionate 50 MICROgram(s)/spray Nasal Spray 1 Spray(s) Both Nostrils two times a day  gabapentin 300 milliGRAM(s) Oral every 12 hours  heparin   Injectable 5000 Unit(s) SubCutaneous every 8 hours  influenza   Vaccine 0.5 milliLiter(s) IntraMuscular once  lacosamide 200 milliGRAM(s) Oral every 12 hours  levETIRAcetam 1500 milliGRAM(s) Oral every 12 hours  metoprolol tartrate 12.5 milliGRAM(s) Oral every 12 hours  pantoprazole   Suspension 40 milliGRAM(s) Oral daily  petrolatum Ophthalmic Ointment 1 Application(s) Both EYES every 4 hours  phenytoin   Suspension 150 milliGRAM(s) Oral <User Schedule>  phenytoin   Suspension 200 milliGRAM(s) Oral <User Schedule>  polyethylene glycol 3350 17 Gram(s) Oral daily  senna 2 Tablet(s) Oral at bedtime  sodium chloride 0.65% Nasal 1 Spray(s) Both Nostrils two times a day    MEDICATIONS  (PRN):  acetaminophen   Oral Liquid .. 650 milliGRAM(s) Oral every 6 hours PRN Temp greater or equal to 38C (100.4F), Mild Pain (1 - 3)  LORazepam   Injectable 2 milliGRAM(s) IV Push once PRN Seizure Activity (NOTIFY PROVIDER PRIOR TO GIVING)      Analgesic Use (Scheduled and PRNs) for past 24 hours:    gabapentin   300 milliGRAM(s) Oral (03-13-25 @ 01:09)   300 milliGRAM(s) Oral (03-12-25 @ 13:56)    lacosamide   200 milliGRAM(s) Oral (03-13-25 @ 05:24)   200 milliGRAM(s) Oral (03-12-25 @ 18:29)    levETIRAcetam   1500 milliGRAM(s) Oral (03-13-25 @ 05:23)   1500 milliGRAM(s) Oral (03-12-25 @ 18:30)    phenytoin   Suspension   150 milliGRAM(s) Oral (03-13-25 @ 12:11)   150 milliGRAM(s) Oral (03-13-25 @ 04:04)    phenytoin   Suspension   200 milliGRAM(s) Oral (03-12-25 @ 19:30)      ITEMS UNCHECKED ARE NOT PRESENT  PRESENT SYMPTOMS/REVIEW OF SYSTEMS: []Unable to obtain due to poor mentation   Source if other than patient:  []Family   []Team     PHYSICAL EXAM  middle aged Male, lying on hospital bed, in NAD  trach + vent dependent  PEG tube in place  Abd is soft and nontender  Functional quadriplegia noted;   Eyes were open; twitching noted.   Able to do purposeful movements of R fist on voice commands.    Vital Signs Last 24 Hrs  T(C): 36.6 (13 Mar 2025 09:01), Max: 37 (13 Mar 2025 05:11)  T(F): 97.9 (13 Mar 2025 09:01), Max: 98.6 (13 Mar 2025 05:11)  HR: 70 (13 Mar 2025 08:30) (68 - 82)  BP: 129/96 (13 Mar 2025 08:30) (101/62 - 129/96)  BP(mean): 109 (13 Mar 2025 08:30) (76 - 109)  RR: 14 (13 Mar 2025 08:30) (14 - 15)  SpO2: 100% (13 Mar 2025 08:30) (96% - 100%)    Parameters below as of 13 Mar 2025 08:30  Patient On (Oxygen Delivery Method): ventilator    O2 Concentration (%): 40    LABS: Personally reviewed and interpreted                        10.1   6.37  )-----------( 223      ( 13 Mar 2025 05:30 )             31.1   03-13    137  |  100  |  52[H]  ----------------------------<  115[H]  4.6   |  23  |  1.16    Ca    9.2      13 Mar 2025 05:30  Phos  4.2     03-13  Mg     2.3     03-13        RADIOLOGY & ADDITIONAL STUDIES: Personally reviewed and interpreted  None new    DISCUSSION OF CASE: Family - to provide updates and emotional support; Primary Team/RN - to discuss plan of care

## 2025-03-13 NOTE — PROGRESS NOTE ADULT - SUBJECTIVE AND OBJECTIVE BOX
SUBJECTIVE:  Patient was seen and examined at bedside. Patient able to communicate by squeezing right hand. Looking comfortable on ventilator, no reports of pain, palliative team to follow-up with patient as he seems to be communicating this morning.     Review of systems: Limited     Diet, NPO with Tube Feed:   Tube Feeding Modality: Gastrostomy  Maribeth Sauceda Renal  Total Volume for 24 Hours (mL): 1062  Total Number of Cans: 5  Continuous  Starting Tube Feed Rate mL per Hour: 59  Increase Tube Feed Rate by (mL): 0     Every 4 hours  Until Goal Tube Feed Rate (mL per Hour): 59  Tube Feed Duration (in Hours): 18  Tube Feed Start Time: 11:00  Tube Feed Stop Time: 05:00 (02-20-25 @ 10:42) [Active]      MEDICATIONS:  MEDICATIONS  (STANDING):  albuterol/ipratropium for Nebulization 3 milliLiter(s) Nebulizer every 4 hours  artificial tears (preservative free) Ophthalmic Solution 1 Drop(s) Both EYES every 4 hours  bisacodyl Suppository 10 milliGRAM(s) Rectal daily  chlorhexidine 0.12% Liquid 15 milliLiter(s) Oral Mucosa every 12 hours  chlorhexidine 2% Cloths 1 Application(s) Topical <User Schedule>  doxazosin 4 milliGRAM(s) Oral at bedtime  erythromycin   Ointment 1 Application(s) Both EYES every 4 hours  fluticasone propionate 50 MICROgram(s)/spray Nasal Spray 1 Spray(s) Both Nostrils two times a day  gabapentin 300 milliGRAM(s) Oral every 12 hours  heparin   Injectable 5000 Unit(s) SubCutaneous every 8 hours  influenza   Vaccine 0.5 milliLiter(s) IntraMuscular once  lacosamide 200 milliGRAM(s) Oral every 12 hours  levETIRAcetam 1500 milliGRAM(s) Oral every 12 hours  metoprolol tartrate 12.5 milliGRAM(s) Oral every 12 hours  pantoprazole   Suspension 40 milliGRAM(s) Oral daily  petrolatum Ophthalmic Ointment 1 Application(s) Both EYES every 4 hours  phenytoin   Suspension 150 milliGRAM(s) Oral <User Schedule>  phenytoin   Suspension 200 milliGRAM(s) Oral <User Schedule>  polyethylene glycol 3350 17 Gram(s) Oral daily  senna 2 Tablet(s) Oral at bedtime  sodium chloride 0.65% Nasal 1 Spray(s) Both Nostrils two times a day    MEDICATIONS  (PRN):  acetaminophen   Oral Liquid .. 650 milliGRAM(s) Oral every 6 hours PRN Temp greater or equal to 38C (100.4F), Mild Pain (1 - 3)  LORazepam   Injectable 2 milliGRAM(s) IV Push once PRN Seizure Activity (NOTIFY PROVIDER PRIOR TO GIVING)      Allergies    Allergy Status Unknown    Intolerances        OBJECTIVE:  Vital Signs Last 24 Hrs  T(C): 36.6 (13 Mar 2025 09:01), Max: 37 (13 Mar 2025 05:11)  T(F): 97.9 (13 Mar 2025 09:01), Max: 98.6 (13 Mar 2025 05:11)  HR: 70 (13 Mar 2025 08:30) (68 - 82)  BP: 129/96 (13 Mar 2025 08:30) (101/62 - 129/96)  BP(mean): 109 (13 Mar 2025 08:30) (76 - 109)  RR: 14 (13 Mar 2025 08:30) (14 - 15)  SpO2: 100% (13 Mar 2025 08:30) (96% - 100%)    Parameters below as of 13 Mar 2025 08:30  Patient On (Oxygen Delivery Method): ventilator    O2 Concentration (%): 40  I&O's Summary    12 Mar 2025 07:01  -  13 Mar 2025 07:00  --------------------------------------------------------  IN: 2401 mL / OUT: 1780 mL / NET: 621 mL    13 Mar 2025 07:01  -  13 Mar 2025 11:05  --------------------------------------------------------  IN: 558 mL / OUT: 700 mL / NET: -142 mL        PHYSICAL EXAM:  Gen: Reclining in bed at time of exam, appears stated age  HEENT: NCAT, MMM, clear OP  Neck: supple, trachea at midline  CV: RRR, +S1/S2  Pulm: adequate respiratory effort, no increase in work of breathing  Abd: soft, NTND  Skin: warm and dry,   Ext: WWP, no LE edema  Neuro: AOx3, speaking in full sentences  Psych: affect and behavior appropriate, pleasant at time of interview    LABS:                        10.1   6.37  )-----------( 223      ( 13 Mar 2025 05:30 )             31.1     03-13    137  |  100  |  52[H]  ----------------------------<  115[H]  4.6   |  23  |  1.16    Ca    9.2      13 Mar 2025 05:30  Phos  4.2     03-13  Mg     2.3     03-13          CAPILLARY BLOOD GLUCOSE        Urinalysis Basic - ( 13 Mar 2025 05:30 )    Color: x / Appearance: x / SG: x / pH: x  Gluc: 115 mg/dL / Ketone: x  / Bili: x / Urobili: x   Blood: x / Protein: x / Nitrite: x   Leuk Esterase: x / RBC: x / WBC x   Sq Epi: x / Non Sq Epi: x / Bacteria: x        MICRODATA:      RADIOLOGY/OTHER STUDIES:   SUBJECTIVE:  Patient was seen and examined at bedside. Patient able to communicate by squeezing right hand. Looking comfortable on ventilator, no reports of pain, palliative team to follow-up with patient as he seems to be communicating this morning.     Review of systems: Limited     Diet, NPO with Tube Feed:   Tube Feeding Modality: Gastrostomy  Maribeth Sauceda Renal  Total Volume for 24 Hours (mL): 1062  Total Number of Cans: 5  Continuous  Starting Tube Feed Rate mL per Hour: 59  Increase Tube Feed Rate by (mL): 0     Every 4 hours  Until Goal Tube Feed Rate (mL per Hour): 59  Tube Feed Duration (in Hours): 18  Tube Feed Start Time: 11:00  Tube Feed Stop Time: 05:00 (02-20-25 @ 10:42) [Active]      MEDICATIONS:  MEDICATIONS  (STANDING):  albuterol/ipratropium for Nebulization 3 milliLiter(s) Nebulizer every 4 hours  artificial tears (preservative free) Ophthalmic Solution 1 Drop(s) Both EYES every 4 hours  bisacodyl Suppository 10 milliGRAM(s) Rectal daily  chlorhexidine 0.12% Liquid 15 milliLiter(s) Oral Mucosa every 12 hours  chlorhexidine 2% Cloths 1 Application(s) Topical <User Schedule>  doxazosin 4 milliGRAM(s) Oral at bedtime  erythromycin   Ointment 1 Application(s) Both EYES every 4 hours  fluticasone propionate 50 MICROgram(s)/spray Nasal Spray 1 Spray(s) Both Nostrils two times a day  gabapentin 300 milliGRAM(s) Oral every 12 hours  heparin   Injectable 5000 Unit(s) SubCutaneous every 8 hours  influenza   Vaccine 0.5 milliLiter(s) IntraMuscular once  lacosamide 200 milliGRAM(s) Oral every 12 hours  levETIRAcetam 1500 milliGRAM(s) Oral every 12 hours  metoprolol tartrate 12.5 milliGRAM(s) Oral every 12 hours  pantoprazole   Suspension 40 milliGRAM(s) Oral daily  petrolatum Ophthalmic Ointment 1 Application(s) Both EYES every 4 hours  phenytoin   Suspension 150 milliGRAM(s) Oral <User Schedule>  phenytoin   Suspension 200 milliGRAM(s) Oral <User Schedule>  polyethylene glycol 3350 17 Gram(s) Oral daily  senna 2 Tablet(s) Oral at bedtime  sodium chloride 0.65% Nasal 1 Spray(s) Both Nostrils two times a day    MEDICATIONS  (PRN):  acetaminophen   Oral Liquid .. 650 milliGRAM(s) Oral every 6 hours PRN Temp greater or equal to 38C (100.4F), Mild Pain (1 - 3)  LORazepam   Injectable 2 milliGRAM(s) IV Push once PRN Seizure Activity (NOTIFY PROVIDER PRIOR TO GIVING)      Allergies    Allergy Status Unknown    Intolerances        OBJECTIVE:  Vital Signs Last 24 Hrs  T(C): 36.6 (13 Mar 2025 09:01), Max: 37 (13 Mar 2025 05:11)  T(F): 97.9 (13 Mar 2025 09:01), Max: 98.6 (13 Mar 2025 05:11)  HR: 70 (13 Mar 2025 08:30) (68 - 82)  BP: 129/96 (13 Mar 2025 08:30) (101/62 - 129/96)  BP(mean): 109 (13 Mar 2025 08:30) (76 - 109)  RR: 14 (13 Mar 2025 08:30) (14 - 15)  SpO2: 100% (13 Mar 2025 08:30) (96% - 100%)    Parameters below as of 13 Mar 2025 08:30  Patient On (Oxygen Delivery Method): ventilator    O2 Concentration (%): 40  I&O's Summary    12 Mar 2025 07:01  -  13 Mar 2025 07:00  --------------------------------------------------------  IN: 2401 mL / OUT: 1780 mL / NET: 621 mL    13 Mar 2025 07:01  -  13 Mar 2025 11:05  --------------------------------------------------------  IN: 558 mL / OUT: 700 mL / NET: -142 mL        PHYSICAL EXAM:  General: awakes to voice, NAD, no labored breathing on vent  HEENT: facial twitching, trach in place, clean  Lungs: limited, no crackles, no wheezes  Heart: regular  Abdomen: soft, no visible tenderness, no distension   Extremities: Vuong in place, clear urine, LE:  warm, no edema, no visible tenderness, following simple commands, able to squeeze right hand on command,       LABS:                        10.1   6.37  )-----------( 223      ( 13 Mar 2025 05:30 )             31.1     03-13    137  |  100  |  52[H]  ----------------------------<  115[H]  4.6   |  23  |  1.16    Ca    9.2      13 Mar 2025 05:30  Phos  4.2     03-13  Mg     2.3     03-13          CAPILLARY BLOOD GLUCOSE        Urinalysis Basic - ( 13 Mar 2025 05:30 )    Color: x / Appearance: x / SG: x / pH: x  Gluc: 115 mg/dL / Ketone: x  / Bili: x / Urobili: x   Blood: x / Protein: x / Nitrite: x   Leuk Esterase: x / RBC: x / WBC x   Sq Epi: x / Non Sq Epi: x / Bacteria: x        MICRODATA:      RADIOLOGY/OTHER STUDIES:

## 2025-03-13 NOTE — PROGRESS NOTE ADULT - PROBLEM SELECTOR PROBLEM 5
Advanced care planning/counseling discussion
Chronic respiratory failure
Chronic respiratory failure
Encounter for palliative care
Encounter for palliative care
Chronic respiratory failure
Encounter for palliative care
Neurogenic dysphagia
Encounter for palliative care
Advanced care planning/counseling discussion
Advanced care planning/counseling discussion
Encounter for palliative care
Encounter for palliative care
Neurogenic dysphagia
Chronic respiratory failure
Neurogenic dysphagia
Neurogenic dysphagia
Chronic respiratory failure

## 2025-03-13 NOTE — PROGRESS NOTE ADULT - SUBJECTIVE AND OBJECTIVE BOX
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 MICROgram(s)/spray Nasal Spray 1 Spray(s) Both Nostrils two times a day  influenza   Vaccine 0.5 milliLiter(s) IntraMuscular once  metoprolol tartrate 12.5 milliGRAM(s) Oral every 12 hours  pantoprazole   Suspension 40 milliGRAM(s) Oral daily  petrolatum Ophthalmic Ointment 1 Application(s) Both EYES every 4 hours  polyethylene glycol 3350 17 Gram(s) Oral daily  senna 2 Tablet(s) Oral at bedtime  sodium chloride 0.65% Nasal 1 Spray(s) Both Nostrils two times a day    IVF:    CULTURES:    RADIOLOGY & ADDITIONAL TESTS:      ASSESSMENT:  44M PMH ?stroke/MI present to [facility] after collapsing at work. Decorticate posturing, vomiting, intubated for airway protection. 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 Transferred to [facility] for further management. s/p trach 10/14. s/p peg 10/21. Re-upgrade to ICU 2/2 desaturation event and suctioning requirements 11/15. Re-upgrade to NSICU 12/15 2/2 desaturation and tachycardia.    Neuro:  - neuro/vitals q4h  - pain control: tylenol prn  - seizure tx: keppra 1500mg BID, phenytoin 150/150/200, vimpat 200mg BID, gabapentin 300mg BID   - s/p vEEG (3/1- 3/6), s/p vEEG: +seizure on 2/17, 3/1  - CTH 9/30: enlarged pontine hemorrhage, CTH 10/3: stable, CTH 10/25: mostly resolved pontine hemorrhage, CTH 12/15: R mastoid air cell opacification; acute otitis media vs sterile effusion, CTH 12/18: stable. CTH 1/21 stable, CTH 1/27 stable, CTH 3/1: stable   - MRI brain 10/12: parenchymal hemorrhage, acute/subacute R cerebellar stroke      CV:  - -160  - HTN/tachycardia: Metoprolol 12.5 mg BID   - echo (9/30) EF 75%, repeat 12/16: 57%  - MO 3/3: 172    PULM:  - s/p trach exchange with pulm at bedside 2/14 to vent, AC/VC 40/400/14/6  - Secretions: duonebs/chest PT q4h  - CT chest 2/17: Near complete collapse b/l lower lobes. Patchy opacity within LLL/ALONDRA  - pulmonology consulted, recs appreciated    GI:  - TFs via PEG, candelaria condon renal  - bowel regimen, last BM 3/11    Renal:  - IVL; FW 200q6 for hydration  - Gabriel 3/12  - CKD: trend BUN/Cr  - renal US 10/1, 10/8, 1/15: Increased b/l renal echogenicity c/w medical renal disease  - urinary retention: doxazosin 4mg at bedtime     Endo:  - A1c 5.4    Heme:  - DVT ppx: SCDs, SQH 5000u q8h   - LE dopplers negative 12/16    ID:  - Afebrile, last pancx 2/17  - sputum 2/16 +Enterobacter cloacae: s/p Ertapenem (2/19-23) per ID recs, conolonized with stenotrophomonas  - empiric PNA: cefepime (12/22-12/30), s/p vanc (12/22-12/23), s/p zosyn (12/15-12/20), s/p vanc (12/15-12/16), s/p zosyn (1/12 -1/18), s/p DS bactrim TID, minocycline BID (2/18)  - Hx +klebsiella UTI & bacteremia, +stenotrophomopnas maltophilia PNA, +Enterobacter cloacae PNA     MISC:  - BL keratitis: BL erythromycin ointment, artificial tears, lacrilube q4, moisture chamber   - Penile wound: wound care rec barrier wipes     Dispo: SDU status, DNR, pending SNF    D/w Dr. D'Amico

## 2025-03-13 NOTE — PROGRESS NOTE ADULT - PROBLEM SELECTOR PROBLEM 4
Pontine hemorrhage
Chronic respiratory failure
Chronic respiratory failure
Advanced care planning/counseling discussion
Pontine hemorrhage
Acute kidney injury superimposed on CKD
Chronic respiratory failure
Advanced care planning/counseling discussion
Pontine hemorrhage
Acute kidney injury superimposed on CKD
Advanced care planning/counseling discussion
Advanced care planning/counseling discussion
Acute kidney injury superimposed on CKD
Advanced care planning/counseling discussion
Advanced care planning/counseling discussion
Acute kidney injury superimposed on CKD
Chronic respiratory failure
Acute kidney injury superimposed on CKD

## 2025-03-13 NOTE — PROGRESS NOTE ADULT - PROBLEM SELECTOR PROBLEM 6
Encounter for palliative care
Neurogenic dysphagia
Encounter for palliative care
Acute urinary retention
Neurogenic dysphagia
Acute urinary retention
Neurogenic dysphagia
Encounter for palliative care
Neurogenic dysphagia
Acute urinary retention
Neurogenic dysphagia
Acute urinary retention

## 2025-03-13 NOTE — DISCHARGE NOTE NURSING/CASE MANAGEMENT/SOCIAL WORK - NSDCPEPTSTROKESIGNS_GEN_ALL_CORE
Sudden numbness or weakness of the face, arm, or leg, especially on one side of the body. Confusion, trouble speaking or understanding. Trouble seeing in one or both eyes. Trouble walking, dizziness, loss of balance or coordination. Severe headache.
no concerns

## 2025-03-13 NOTE — PROGRESS NOTE ADULT - PROBLEM SELECTOR PLAN 4
Baseline unclear but suspect underlying CKD from uncontrolled HTN, US renal with chronic medical renal disease. Hospital course c/b ATN.   - persistent hyperkalemia now on lokelma 5g qd with improvement  - persistent hyperphosphatemia now on phoslo 776 mg TID with improvement  - strict I/O, renally dose medications, avoid nephrotoxins  - labile creatinine, now up-trending in setting of sepsis, caution with vancomycin
.  -management as per primary team   -still having seizure activity, but better controlled as per Epilepsy.  -hospice eligible if within GOC
S/p percutaneous trach by pulm on 10/14/24  - currently on trach collar   - cont routine trach care, frequent suctioning  - duo-neb with mucomyst q6hr PRN  - strong pulm hygiene with chest PT BID
-MOLST. DNR.  -Surrogate: Olivier Carrillo (son)  -See [facility] note.  -Family meeting 12/30: Olivier wishes to continue medical support (mechanical ventilation, enteral feeds).   -Would discuss with ID if medical indication to continue IV antibiotics if infection reoccurs after the current Abx course  -Note that, providers can withhold life sustaining treatment (specifically CPR and intubation) if attending physician determines that the treatments offer the patient no medical benefit because the patient will die imminently with or without the proposed treatment and the provision of such treatment violates accepted medical standards.   -per chart, a guardianship hearing was held on 12/23. Per care coordination note "Court determined  that patient is incapacitated and requires guardianship. Kenna Gracendia will serve as patient's guardian and assist in obtaining PRUCOL. Patient's son will continue to make medical decisions on behalf of patient..."  -need to review guardianship paperwork to determine if Kenna needs to be involved in health care related decisions.
-MOLST. DNR.  -Surrogate: Olivier Carrillo (son)  -See [facility] note.  -Family meeting 12/30: Olivier wishes to continue medical support (mechanical ventilation, enteral feeds).   -Would discuss with ID if medical indication to continue IV antibiotics if infection reoccurs after the current Abx course  -per chart, a guardianship hearing was held on 12/23. Per care coordination note "Court determined  that patient is incapacitated and requires guardianship. Kenna Gracendia will serve as patient's guardian and assist in obtaining PRUCOL. Patient's son will continue to make medical decisions on behalf of patient..."  -need to review guardianship paperwork to determine if Kenna needs to be involved in health care related decisions. -Palliative SW support.
.  .  -Recommend family meeting with neurosurgery team and patient's son, Olivier, for information sharing and updating on worsening of mental status. Plan for meeting on 12/30 at 12pm.  -Would also discuss with ID if medical indication to continue IV antibiotics if infection reoccurs after the current Abx course  -per chart, a guardianship hearing was held on 12/23. Per care coordination note "Court determined  that patient is incapacitated and requires guardianship. Kenna Fannyia will serve as patient's guardian and assist in obtaining PRUCOL. Patient's son will continue to make medical decisions on behalf of patient..."    -need to review guardianship paperwork to determine if Kenna needs to be involved in health care related decisions. Palliative SW support.
Baseline unclear but suspect underlying CKD from uncontrolled HTN, US renal with chronic medical renal disease. Hospital course c/b ATN.   - persistent hyperkalemia now on lokelma 5g qd with improvement  - persistent hyperphosphatemia now on phoslo 776 mg TID with improvement  - strict I/O, renally dose medications, avoid nephrotoxins  - creat lateral in last 48hrs, cont to trend and monitor UOP
Baseline unclear but suspect underlying CKD from uncontrolled HTN, US renal with chronic medical renal disease. Hospital course c/b ATN.   - persistent hyperkalemia now on lokelma 5g qd with improvement  - persistent hyperphosphatemia now on phoslo 776 mg TID with improvement  - strict I/O, renally dose medications, avoid nephrotoxins  - creat improving, cont to trend and monitor UOP
.  -Son Olivier, is the NOK for medical decisions; patient is able to reply yes/no questions. informed about vent dependence and plan for NH placement.   -FULL CODE  -GOC are to prolong life  -Dispo: NH w/ vent    -patient and family aware of poor prognosis. son is hopeful to prolong the patient's life as much as possible.  -empathetic listening and emotional support provided.
S/p percutaneous trach by pulm on 10/14/24  - currently on trach collar   - cont routine trach care, frequent suctioning  - duo-neb with mucomyst q6hr PRN  - strong pulm hygiene with chest PT BID
.  -management as per primary team   -still having seizure activity, but better controlled as per Epilepsy.  -hospice eligible if within GOC
.  -management as per primary team   -still having seizure activity, but better controlled as per Epilepsy.  -hospice eligible if within GOC
Ethics consulted to establish surrogate decision maker, poonam Aguayo is surrogate decision maker.  - Poor prognosis. Planning for family meeting with NSICU team and Olivier next week.  - Surrogate: Chauncey Reyes (son), [phone] (primary), [phone] (alternate)
Baseline unclear but suspect underlying CKD from uncontrolled HTN, US renal with chronic medical renal disease. Hospital course c/b ATN.   - persistent hyperkalemia now on lokelma 5g qd with improvement  - persistent hyperphosphatemia now on phoslo 776 mg TID with improvement  - strict I/O, renally dose medications, avoid nephrotoxins  - creat improving, cont to trend and monitor UOP
See [facility] note above.  - [facility] ongoing. Will f/u with family tomorrow.  - Surrogate: Chauncey Deleon (son), [phone] (primary), [phone] (alternate). Surrogacy established by Ethics.    In addition to the E/M visit, an advance care planning meeting was performed. Start time: 1:00pm; End time: 1:20pm; Total time: 20min. A face to face meeting to discuss advance care planning was held today regarding: GARETT DELEON. Primary decision maker: Patient is unable to participate in decision making; Alternate/surrogate: Olivier Deleon. Discussed advance directives including, but not limited to, healthcare proxy and code status. Decision regarding code status: FULL CODE; Documentation completed today: [facility] note
S/p percutaneous trach by pulm on 10/14/24  - currently on trach collar   - cont routine trach care, frequent suctioning  - duo-neb with mucomyst q6hr PRN  - strong pulm hygiene with chest PT BID
S/p percutaneous trach by pulm on 10/14/24  - currently on trach collar   - cont routine trach care, frequent suctioning  - duo-neb with mucomyst q6hr PRN  - strong pulm hygiene with chest PT BID
See [facility] note above.  - [facility] ongoing. Will f/u with family tomorrow.  - Surrogate: Chauncey Deleon (son), [phone] (primary), [phone] (alternate). Surrogacy established by Ethics.    In addition to the E/M visit, an advance care planning meeting was performed. Start time: 1:00pm; End time: 1:16pm; Total time: 16min. A face to face meeting to discuss advance care planning was held today regarding: GARETT DELEON. Primary decision maker: Patient is unable to participate in decision making; Alternate/surrogate: Olivier Deleon. Discussed advance directives including, but not limited to, healthcare proxy and code status. Decision regarding code status: FULL CODE; Documentation completed today: [facility] note
-MOLST. DNR.  -Surrogate: Olivier Carrillo (son)  -See [facility] note.  -Family meeting 12/30: Olivier wishes to continue medical support (mechanical ventilation, enteral feeds).   -Would discuss with ID if medical indication to continue IV antibiotics if infection reoccurs after the current Abx course  -per chart, a guardianship hearing was held on 12/23. Per care coordination note "Court determined  that patient is incapacitated and requires guardianship. Kenna Gracendia will serve as patient's guardian and assist in obtaining PRUCOL. Patient's son will continue to make medical decisions on behalf of patient..."  -need to review guardianship paperwork to determine if Kenna needs to be involved in health care related decisions. -Palliative SW support.
Baseline unclear but suspect underlying CKD from uncontrolled HTN, US renal with chronic medical renal disease. Hospital course c/b ATN.   - persistent hyperkalemia now on lokelma 5g qd with improvement  - persistent hyperphosphatemia now on phoslo 776 mg TID with improvement  - strict I/O, renally dose medications, avoid nephrotoxins  - labile creatinine, now up-trending in setting of sepsis, caution with vancomycin

## 2025-03-14 LAB — GABAPENTIN SERPLBLD-MCNC: 14.6 UG/ML — SIGNIFICANT CHANGE UP (ref 4–16)

## 2025-03-21 DIAGNOSIS — E22.2 SYNDROME OF INAPPROPRIATE SECRETION OF ANTIDIURETIC HORMONE: ICD-10-CM

## 2025-03-21 DIAGNOSIS — N39.0 URINARY TRACT INFECTION, SITE NOT SPECIFIED: ICD-10-CM

## 2025-03-21 DIAGNOSIS — N17.0 ACUTE KIDNEY FAILURE WITH TUBULAR NECROSIS: ICD-10-CM

## 2025-03-21 DIAGNOSIS — J18.9 PNEUMONIA, UNSPECIFIED ORGANISM: ICD-10-CM

## 2025-03-21 DIAGNOSIS — R29.733 NIHSS SCORE 33: ICD-10-CM

## 2025-03-21 DIAGNOSIS — R13.19 OTHER DYSPHAGIA: ICD-10-CM

## 2025-03-21 DIAGNOSIS — I12.9 HYPERTENSIVE CHRONIC KIDNEY DISEASE WITH STAGE 1 THROUGH STAGE 4 CHRONIC KIDNEY DISEASE, OR UNSPECIFIED CHRONIC KIDNEY DISEASE: ICD-10-CM

## 2025-03-21 DIAGNOSIS — D63.8 ANEMIA IN OTHER CHRONIC DISEASES CLASSIFIED ELSEWHERE: ICD-10-CM

## 2025-03-21 DIAGNOSIS — N13.39 OTHER HYDRONEPHROSIS: ICD-10-CM

## 2025-03-21 DIAGNOSIS — I61.3 NONTRAUMATIC INTRACEREBRAL HEMORRHAGE IN BRAIN STEM: ICD-10-CM

## 2025-03-21 DIAGNOSIS — R19.7 DIARRHEA, UNSPECIFIED: ICD-10-CM

## 2025-03-21 DIAGNOSIS — A41.59 OTHER GRAM-NEGATIVE SEPSIS: ICD-10-CM

## 2025-03-21 DIAGNOSIS — J98.11 ATELECTASIS: ICD-10-CM

## 2025-03-21 DIAGNOSIS — Z86.73 PERSONAL HISTORY OF TRANSIENT ISCHEMIC ATTACK (TIA), AND CEREBRAL INFARCTION WITHOUT RESIDUAL DEFICITS: ICD-10-CM

## 2025-03-21 DIAGNOSIS — R53.2 FUNCTIONAL QUADRIPLEGIA: ICD-10-CM

## 2025-03-21 DIAGNOSIS — R00.0 TACHYCARDIA, UNSPECIFIED: ICD-10-CM

## 2025-03-21 DIAGNOSIS — I60.8 OTHER NONTRAUMATIC SUBARACHNOID HEMORRHAGE: ICD-10-CM

## 2025-03-21 DIAGNOSIS — R33.8 OTHER RETENTION OF URINE: ICD-10-CM

## 2025-03-21 DIAGNOSIS — J96.21 ACUTE AND CHRONIC RESPIRATORY FAILURE WITH HYPOXIA: ICD-10-CM

## 2025-03-21 DIAGNOSIS — N18.4 CHRONIC KIDNEY DISEASE, STAGE 4 (SEVERE): ICD-10-CM

## 2025-03-21 DIAGNOSIS — I25.10 ATHEROSCLEROTIC HEART DISEASE OF NATIVE CORONARY ARTERY WITHOUT ANGINA PECTORIS: ICD-10-CM

## 2025-03-21 DIAGNOSIS — E83.39 OTHER DISORDERS OF PHOSPHORUS METABOLISM: ICD-10-CM

## 2025-03-21 DIAGNOSIS — E87.0 HYPEROSMOLALITY AND HYPERNATREMIA: ICD-10-CM

## 2025-03-21 DIAGNOSIS — G93.5 COMPRESSION OF BRAIN: ICD-10-CM

## 2025-03-21 DIAGNOSIS — R40.2112 COMA SCALE, EYES OPEN, NEVER, AT ARRIVAL TO EMERGENCY DEPARTMENT: ICD-10-CM

## 2025-03-21 DIAGNOSIS — G93.89 OTHER SPECIFIED DISORDERS OF BRAIN: ICD-10-CM

## 2025-03-21 DIAGNOSIS — G93.6 CEREBRAL EDEMA: ICD-10-CM

## 2025-03-21 DIAGNOSIS — G93.49 OTHER ENCEPHALOPATHY: ICD-10-CM

## 2025-03-21 DIAGNOSIS — R40.2222 COMA SCALE, BEST VERBAL RESPONSE, INCOMPREHENSIBLE WORDS, AT ARRIVAL TO EMERGENCY DEPARTMENT: ICD-10-CM

## 2025-03-21 DIAGNOSIS — E87.5 HYPERKALEMIA: ICD-10-CM

## 2025-03-21 DIAGNOSIS — I25.2 OLD MYOCARDIAL INFARCTION: ICD-10-CM

## 2025-03-21 DIAGNOSIS — B95.61 METHICILLIN SUSCEPTIBLE STAPHYLOCOCCUS AUREUS INFECTION AS THE CAUSE OF DISEASES CLASSIFIED ELSEWHERE: ICD-10-CM

## 2025-03-21 DIAGNOSIS — Z66 DO NOT RESUSCITATE: ICD-10-CM

## 2025-03-21 DIAGNOSIS — B96.5 PSEUDOMONAS (AERUGINOSA) (MALLEI) (PSEUDOMALLEI) AS THE CAUSE OF DISEASES CLASSIFIED ELSEWHERE: ICD-10-CM
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
## 2025-03-21 DIAGNOSIS — B96.83 ACINETOBACTER BAUMANNII AS THE CAUSE OF DISEASES CLASSIFIED ELSEWHERE: ICD-10-CM

## 2025-03-21 DIAGNOSIS — N40.1 BENIGN PROSTATIC HYPERPLASIA WITH LOWER URINARY TRACT SYMPTOMS: ICD-10-CM

## 2025-03-21 DIAGNOSIS — H16.8 OTHER KERATITIS: ICD-10-CM

## 2025-03-21 DIAGNOSIS — R25.3 FASCICULATION: ICD-10-CM

## 2025-03-21 DIAGNOSIS — E11.22 TYPE 2 DIABETES MELLITUS WITH DIABETIC CHRONIC KIDNEY DISEASE: ICD-10-CM

## 2025-03-21 DIAGNOSIS — I63.89 OTHER CEREBRAL INFARCTION: ICD-10-CM

## 2025-03-21 DIAGNOSIS — G40.109 LOCALIZATION-RELATED (FOCAL) (PARTIAL) SYMPTOMATIC EPILEPSY AND EPILEPTIC SYNDROMES WITH SIMPLE PARTIAL SEIZURES, NOT INTRACTABLE, WITHOUT STATUS EPILEPTICUS: ICD-10-CM

## 2025-08-14 PROCEDURE — 71250 CT THORAX DX C-: CPT | Mod: MC

## 2025-08-14 PROCEDURE — 80185 ASSAY OF PHENYTOIN TOTAL: CPT

## 2025-08-14 PROCEDURE — 80053 COMPREHEN METABOLIC PANEL: CPT

## 2025-08-14 PROCEDURE — 87040 BLOOD CULTURE FOR BACTERIA: CPT

## 2025-08-14 PROCEDURE — 87641 MR-STAPH DNA AMP PROBE: CPT

## 2025-08-14 PROCEDURE — 85730 THROMBOPLASTIN TIME PARTIAL: CPT

## 2025-08-14 PROCEDURE — 89051 BODY FLUID CELL COUNT: CPT

## 2025-08-14 PROCEDURE — 96375 TX/PRO/DX INJ NEW DRUG ADDON: CPT

## 2025-08-14 PROCEDURE — 92507 TX SP LANG VOICE COMM INDIV: CPT

## 2025-08-14 PROCEDURE — C9254: CPT

## 2025-08-14 PROCEDURE — 87150 DNA/RNA AMPLIFIED PROBE: CPT

## 2025-08-14 PROCEDURE — 87070 CULTURE OTHR SPECIMN AEROBIC: CPT

## 2025-08-14 PROCEDURE — 82962 GLUCOSE BLOOD TEST: CPT

## 2025-08-14 PROCEDURE — 82570 ASSAY OF URINE CREATININE: CPT

## 2025-08-14 PROCEDURE — 94799 UNLISTED PULMONARY SVC/PX: CPT

## 2025-08-14 PROCEDURE — 76775 US EXAM ABDO BACK WALL LIM: CPT

## 2025-08-14 PROCEDURE — 31500 INSERT EMERGENCY AIRWAY: CPT

## 2025-08-14 PROCEDURE — 83735 ASSAY OF MAGNESIUM: CPT

## 2025-08-14 PROCEDURE — 0225U NFCT DS DNA&RNA 21 SARSCOV2: CPT

## 2025-08-14 PROCEDURE — 84300 ASSAY OF URINE SODIUM: CPT

## 2025-08-14 PROCEDURE — 84295 ASSAY OF SERUM SODIUM: CPT

## 2025-08-14 PROCEDURE — 82565 ASSAY OF CREATININE: CPT

## 2025-08-14 PROCEDURE — 84540 ASSAY OF URINE/UREA-N: CPT

## 2025-08-14 PROCEDURE — C8929: CPT

## 2025-08-14 PROCEDURE — 76770 US EXAM ABDO BACK WALL COMP: CPT

## 2025-08-14 PROCEDURE — A9585: CPT

## 2025-08-14 PROCEDURE — 85027 COMPLETE CBC AUTOMATED: CPT

## 2025-08-14 PROCEDURE — 74018 RADEX ABDOMEN 1 VIEW: CPT

## 2025-08-14 PROCEDURE — 85610 PROTHROMBIN TIME: CPT

## 2025-08-14 PROCEDURE — 97530 THERAPEUTIC ACTIVITIES: CPT

## 2025-08-14 PROCEDURE — 97112 NEUROMUSCULAR REEDUCATION: CPT

## 2025-08-14 PROCEDURE — 84520 ASSAY OF UREA NITROGEN: CPT

## 2025-08-14 PROCEDURE — 36415 COLL VENOUS BLD VENIPUNCTURE: CPT

## 2025-08-14 PROCEDURE — 84484 ASSAY OF TROPONIN QUANT: CPT

## 2025-08-14 PROCEDURE — 97162 PT EVAL MOD COMPLEX 30 MIN: CPT

## 2025-08-14 PROCEDURE — 71275 CT ANGIOGRAPHY CHEST: CPT | Mod: MC

## 2025-08-14 PROCEDURE — 82272 OCCULT BLD FECES 1-3 TESTS: CPT

## 2025-08-14 PROCEDURE — 82330 ASSAY OF CALCIUM: CPT

## 2025-08-14 PROCEDURE — 94640 AIRWAY INHALATION TREATMENT: CPT

## 2025-08-14 PROCEDURE — 74177 CT ABD & PELVIS W/CONTRAST: CPT | Mod: MC

## 2025-08-14 PROCEDURE — 97129 THER IVNTJ 1ST 15 MIN: CPT

## 2025-08-14 PROCEDURE — 97535 SELF CARE MNGMENT TRAINING: CPT

## 2025-08-14 PROCEDURE — 83605 ASSAY OF LACTIC ACID: CPT

## 2025-08-14 PROCEDURE — 96374 THER/PROPH/DIAG INJ IV PUSH: CPT | Mod: XU

## 2025-08-14 PROCEDURE — 84100 ASSAY OF PHOSPHORUS: CPT

## 2025-08-14 PROCEDURE — 70553 MRI BRAIN STEM W/O & W/DYE: CPT | Mod: MC

## 2025-08-14 PROCEDURE — 86850 RBC ANTIBODY SCREEN: CPT

## 2025-08-14 PROCEDURE — 81001 URINALYSIS AUTO W/SCOPE: CPT

## 2025-08-14 PROCEDURE — 80186 ASSAY OF PHENYTOIN FREE: CPT

## 2025-08-14 PROCEDURE — 93970 EXTREMITY STUDY: CPT

## 2025-08-14 PROCEDURE — 85025 COMPLETE CBC W/AUTO DIFF WBC: CPT

## 2025-08-14 PROCEDURE — 80202 ASSAY OF VANCOMYCIN: CPT

## 2025-08-14 PROCEDURE — T1013: CPT

## 2025-08-14 PROCEDURE — 83935 ASSAY OF URINE OSMOLALITY: CPT

## 2025-08-14 PROCEDURE — 94002 VENT MGMT INPAT INIT DAY: CPT

## 2025-08-14 PROCEDURE — 95708 EEG WO VID EA 12-26HR UNMNTR: CPT

## 2025-08-14 PROCEDURE — 87205 SMEAR GRAM STAIN: CPT

## 2025-08-14 PROCEDURE — 97165 OT EVAL LOW COMPLEX 30 MIN: CPT

## 2025-08-14 PROCEDURE — 82040 ASSAY OF SERUM ALBUMIN: CPT

## 2025-08-14 PROCEDURE — 80076 HEPATIC FUNCTION PANEL: CPT

## 2025-08-14 PROCEDURE — 83880 ASSAY OF NATRIURETIC PEPTIDE: CPT

## 2025-08-14 PROCEDURE — 97110 THERAPEUTIC EXERCISES: CPT

## 2025-08-14 PROCEDURE — P9045: CPT

## 2025-08-14 PROCEDURE — 82550 ASSAY OF CK (CPK): CPT

## 2025-08-14 PROCEDURE — 87184 SC STD DISK METHOD PER PLATE: CPT

## 2025-08-14 PROCEDURE — 94003 VENT MGMT INPAT SUBQ DAY: CPT

## 2025-08-14 PROCEDURE — 95700 EEG CONT REC W/VID EEG TECH: CPT

## 2025-08-14 PROCEDURE — 86901 BLOOD TYPING SEROLOGIC RH(D): CPT

## 2025-08-14 PROCEDURE — 80048 BASIC METABOLIC PNL TOTAL CA: CPT

## 2025-08-14 PROCEDURE — 84145 PROCALCITONIN (PCT): CPT

## 2025-08-14 PROCEDURE — 84132 ASSAY OF SERUM POTASSIUM: CPT

## 2025-08-14 PROCEDURE — 87086 URINE CULTURE/COLONY COUNT: CPT

## 2025-08-14 PROCEDURE — 97130 THER IVNTJ EA ADDL 15 MIN: CPT

## 2025-08-14 PROCEDURE — 87640 STAPH A DNA AMP PROBE: CPT

## 2025-08-14 PROCEDURE — 93005 ELECTROCARDIOGRAM TRACING: CPT

## 2025-08-14 PROCEDURE — 80171 DRUG SCREEN QUANT GABAPENTIN: CPT

## 2025-08-14 PROCEDURE — 82803 BLOOD GASES ANY COMBINATION: CPT

## 2025-08-14 PROCEDURE — 70450 CT HEAD/BRAIN W/O DYE: CPT | Mod: MC

## 2025-08-14 PROCEDURE — 71045 X-RAY EXAM CHEST 1 VIEW: CPT

## 2025-08-14 PROCEDURE — 82610 CYSTATIN C: CPT

## 2025-08-14 PROCEDURE — 87077 CULTURE AEROBIC IDENTIFY: CPT

## 2025-08-14 PROCEDURE — 85379 FIBRIN DEGRADATION QUANT: CPT

## 2025-08-14 PROCEDURE — 87186 SC STD MICRODIL/AGAR DIL: CPT

## 2025-08-14 PROCEDURE — 83930 ASSAY OF BLOOD OSMOLALITY: CPT

## 2025-08-14 PROCEDURE — 99291 CRITICAL CARE FIRST HOUR: CPT

## 2025-08-14 PROCEDURE — 80177 DRUG SCRN QUAN LEVETIRACETAM: CPT

## 2025-08-14 PROCEDURE — 86900 BLOOD TYPING SEROLOGIC ABO: CPT
